# Patient Record
Sex: MALE | Race: BLACK OR AFRICAN AMERICAN | NOT HISPANIC OR LATINO | URBAN - METROPOLITAN AREA
[De-identification: names, ages, dates, MRNs, and addresses within clinical notes are randomized per-mention and may not be internally consistent; named-entity substitution may affect disease eponyms.]

---

## 2019-03-19 ENCOUNTER — INPATIENT (INPATIENT)
Facility: HOSPITAL | Age: 59
LOS: 9 days | Discharge: HOME | End: 2019-03-29
Attending: INTERNAL MEDICINE | Admitting: INTERNAL MEDICINE
Payer: MEDICAID

## 2019-03-19 VITALS
SYSTOLIC BLOOD PRESSURE: 119 MMHG | WEIGHT: 134.92 LBS | TEMPERATURE: 98 F | OXYGEN SATURATION: 96 % | DIASTOLIC BLOOD PRESSURE: 68 MMHG | RESPIRATION RATE: 16 BRPM | HEART RATE: 78 BPM | HEIGHT: 73 IN

## 2019-03-19 LAB
ALBUMIN SERPL ELPH-MCNC: 3.1 G/DL — LOW (ref 3.3–5)
ALP SERPL-CCNC: 107 U/L — SIGNIFICANT CHANGE UP (ref 40–120)
ALT FLD-CCNC: 16 U/L — SIGNIFICANT CHANGE UP (ref 12–78)
ANION GAP SERPL CALC-SCNC: 27 MMOL/L — HIGH (ref 5–17)
ANISOCYTOSIS BLD QL: SLIGHT — SIGNIFICANT CHANGE UP
APPEARANCE UR: CLEAR — SIGNIFICANT CHANGE UP
APTT BLD: 22.2 SEC — LOW (ref 27.5–36.3)
AST SERPL-CCNC: 34 U/L — SIGNIFICANT CHANGE UP (ref 15–37)
BACTERIA # UR AUTO: ABNORMAL
BASOPHILS # BLD AUTO: 0 K/UL — SIGNIFICANT CHANGE UP (ref 0–0.2)
BASOPHILS NFR BLD AUTO: 0 % — SIGNIFICANT CHANGE UP (ref 0–2)
BILIRUB SERPL-MCNC: 0.5 MG/DL — SIGNIFICANT CHANGE UP (ref 0.2–1.2)
BILIRUB UR-MCNC: NEGATIVE — SIGNIFICANT CHANGE UP
BLD GP AB SCN SERPL QL: SIGNIFICANT CHANGE UP
BUN SERPL-MCNC: 92 MG/DL — HIGH (ref 7–23)
CALCIUM SERPL-MCNC: 7.1 MG/DL — LOW (ref 8.5–10.1)
CHLORIDE SERPL-SCNC: 88 MMOL/L — LOW (ref 96–108)
CK MB CFR SERPL CALC: 6.5 NG/ML — HIGH (ref 0.5–3.6)
CO2 SERPL-SCNC: 17 MMOL/L — LOW (ref 22–31)
COLOR SPEC: YELLOW — SIGNIFICANT CHANGE UP
CREAT SERPL-MCNC: 4.48 MG/DL — HIGH (ref 0.5–1.3)
DACRYOCYTES BLD QL SMEAR: SLIGHT — SIGNIFICANT CHANGE UP
DIFF PNL FLD: ABNORMAL
ELLIPTOCYTES BLD QL SMEAR: SLIGHT — SIGNIFICANT CHANGE UP
EOSINOPHIL # BLD AUTO: 0 K/UL — SIGNIFICANT CHANGE UP (ref 0–0.5)
EOSINOPHIL NFR BLD AUTO: 0 % — SIGNIFICANT CHANGE UP (ref 0–6)
EPI CELLS # UR: SIGNIFICANT CHANGE UP
ETHANOL SERPL-MCNC: <10 MG/DL — SIGNIFICANT CHANGE UP (ref 0–10)
FLU A RESULT: SIGNIFICANT CHANGE UP
FLU A RESULT: SIGNIFICANT CHANGE UP
FLUAV AG NPH QL: SIGNIFICANT CHANGE UP
FLUBV AG NPH QL: SIGNIFICANT CHANGE UP
GLUCOSE SERPL-MCNC: 102 MG/DL — HIGH (ref 70–99)
GLUCOSE UR QL: NEGATIVE MG/DL — SIGNIFICANT CHANGE UP
HCT VFR BLD CALC: 21.7 % — LOW (ref 39–50)
HGB BLD-MCNC: 6 G/DL — CRITICAL LOW (ref 13–17)
HYPOCHROMIA BLD QL: SIGNIFICANT CHANGE UP
INR BLD: 0.93 RATIO — SIGNIFICANT CHANGE UP (ref 0.88–1.16)
KETONES UR-MCNC: ABNORMAL
LACTATE SERPL-SCNC: 1.6 MMOL/L — SIGNIFICANT CHANGE UP (ref 0.7–2)
LEUKOCYTE ESTERASE UR-ACNC: NEGATIVE — SIGNIFICANT CHANGE UP
LIDOCAIN IGE QN: 1404 U/L — HIGH (ref 73–393)
LYMPHOCYTES # BLD AUTO: 0.57 K/UL — LOW (ref 1–3.3)
LYMPHOCYTES # BLD AUTO: 4 % — LOW (ref 13–44)
MACROCYTES BLD QL: SLIGHT — SIGNIFICANT CHANGE UP
MANUAL SMEAR VERIFICATION: SIGNIFICANT CHANGE UP
MCHC RBC-ENTMCNC: 21.2 PG — LOW (ref 27–34)
MCHC RBC-ENTMCNC: 27.6 GM/DL — LOW (ref 32–36)
MCV RBC AUTO: 76.7 FL — LOW (ref 80–100)
MICROCYTES BLD QL: SIGNIFICANT CHANGE UP
MONOCYTES # BLD AUTO: 0.28 K/UL — SIGNIFICANT CHANGE UP (ref 0–0.9)
MONOCYTES NFR BLD AUTO: 2 % — SIGNIFICANT CHANGE UP (ref 2–14)
NEUTROPHILS # BLD AUTO: 13.38 K/UL — HIGH (ref 1.8–7.4)
NEUTROPHILS NFR BLD AUTO: 94 % — HIGH (ref 43–77)
NITRITE UR-MCNC: NEGATIVE — SIGNIFICANT CHANGE UP
NRBC # BLD: 0 /100 — SIGNIFICANT CHANGE UP (ref 0–0)
NRBC # BLD: SIGNIFICANT CHANGE UP /100 WBCS (ref 0–0)
NT-PROBNP SERPL-SCNC: 3818 PG/ML — HIGH (ref 0–125)
OVALOCYTES BLD QL SMEAR: SLIGHT — SIGNIFICANT CHANGE UP
PH UR: 6 — SIGNIFICANT CHANGE UP (ref 5–8)
PLAT MORPH BLD: NORMAL — SIGNIFICANT CHANGE UP
PLATELET # BLD AUTO: 312 K/UL — SIGNIFICANT CHANGE UP (ref 150–400)
POIKILOCYTOSIS BLD QL AUTO: SLIGHT — SIGNIFICANT CHANGE UP
POLYCHROMASIA BLD QL SMEAR: SLIGHT — SIGNIFICANT CHANGE UP
POTASSIUM SERPL-MCNC: 2.4 MMOL/L — CRITICAL LOW (ref 3.5–5.3)
POTASSIUM SERPL-SCNC: 2.4 MMOL/L — CRITICAL LOW (ref 3.5–5.3)
PROT SERPL-MCNC: 6.4 GM/DL — SIGNIFICANT CHANGE UP (ref 6–8.3)
PROT UR-MCNC: 100 MG/DL
PROTHROM AB SERPL-ACNC: 10.4 SEC — SIGNIFICANT CHANGE UP (ref 10–12.9)
RBC # BLD: 2.69 M/UL — LOW (ref 4.2–5.8)
RBC # BLD: 2.83 M/UL — LOW (ref 4.2–5.8)
RBC # FLD: 19.5 % — HIGH (ref 10.3–14.5)
RBC BLD AUTO: ABNORMAL
RBC CASTS # UR COMP ASSIST: SIGNIFICANT CHANGE UP /HPF (ref 0–4)
RETICS #: 60.5 K/UL — SIGNIFICANT CHANGE UP (ref 25–125)
RETICS/RBC NFR: 2.3 % — SIGNIFICANT CHANGE UP (ref 0.5–2.5)
RSV RESULT: SIGNIFICANT CHANGE UP
RSV RNA RESP QL NAA+PROBE: SIGNIFICANT CHANGE UP
SODIUM SERPL-SCNC: 132 MMOL/L — LOW (ref 135–145)
SP GR SPEC: 1.01 — SIGNIFICANT CHANGE UP (ref 1.01–1.02)
TARGETS BLD QL SMEAR: SLIGHT — SIGNIFICANT CHANGE UP
TROPONIN I SERPL-MCNC: <.015 NG/ML — SIGNIFICANT CHANGE UP (ref 0.01–0.04)
UROBILINOGEN FLD QL: NEGATIVE MG/DL — SIGNIFICANT CHANGE UP
WBC # BLD: 14.23 K/UL — HIGH (ref 3.8–10.5)
WBC # FLD AUTO: 14.23 K/UL — HIGH (ref 3.8–10.5)

## 2019-03-19 PROCEDURE — 99285 EMERGENCY DEPT VISIT HI MDM: CPT

## 2019-03-19 PROCEDURE — 93010 ELECTROCARDIOGRAM REPORT: CPT

## 2019-03-19 PROCEDURE — 83020 HEMOGLOBIN ELECTROPHORESIS: CPT | Mod: 26

## 2019-03-19 PROCEDURE — 71045 X-RAY EXAM CHEST 1 VIEW: CPT | Mod: 26

## 2019-03-19 PROCEDURE — 99223 1ST HOSP IP/OBS HIGH 75: CPT

## 2019-03-19 PROCEDURE — 74176 CT ABD & PELVIS W/O CONTRAST: CPT | Mod: 26

## 2019-03-19 PROCEDURE — 72100 X-RAY EXAM L-S SPINE 2/3 VWS: CPT | Mod: 26

## 2019-03-19 RX ORDER — OXYCODONE AND ACETAMINOPHEN 5; 325 MG/1; MG/1
2 TABLET ORAL ONCE
Qty: 0 | Refills: 0 | Status: DISCONTINUED | OUTPATIENT
Start: 2019-03-19 | End: 2019-03-19

## 2019-03-19 RX ORDER — POTASSIUM CHLORIDE 20 MEQ
40 PACKET (EA) ORAL ONCE
Qty: 0 | Refills: 0 | Status: COMPLETED | OUTPATIENT
Start: 2019-03-19 | End: 2019-03-19

## 2019-03-19 RX ORDER — MORPHINE SULFATE 50 MG/1
4 CAPSULE, EXTENDED RELEASE ORAL ONCE
Qty: 0 | Refills: 0 | Status: DISCONTINUED | OUTPATIENT
Start: 2019-03-19 | End: 2019-03-19

## 2019-03-19 RX ORDER — IOHEXOL 300 MG/ML
30 INJECTION, SOLUTION INTRAVENOUS ONCE
Qty: 0 | Refills: 0 | Status: COMPLETED | OUTPATIENT
Start: 2019-03-19 | End: 2019-03-19

## 2019-03-19 RX ORDER — POTASSIUM CHLORIDE 20 MEQ
20 PACKET (EA) ORAL ONCE
Qty: 0 | Refills: 0 | Status: COMPLETED | OUTPATIENT
Start: 2019-03-19 | End: 2019-03-19

## 2019-03-19 RX ADMIN — OXYCODONE AND ACETAMINOPHEN 2 TABLET(S): 5; 325 TABLET ORAL at 21:56

## 2019-03-19 RX ADMIN — Medication 20 MILLIEQUIVALENT(S): at 19:00

## 2019-03-19 RX ADMIN — MORPHINE SULFATE 4 MILLIGRAM(S): 50 CAPSULE, EXTENDED RELEASE ORAL at 18:34

## 2019-03-19 RX ADMIN — OXYCODONE AND ACETAMINOPHEN 2 TABLET(S): 5; 325 TABLET ORAL at 21:30

## 2019-03-19 RX ADMIN — MORPHINE SULFATE 4 MILLIGRAM(S): 50 CAPSULE, EXTENDED RELEASE ORAL at 18:04

## 2019-03-19 RX ADMIN — Medication 50 MILLIEQUIVALENT(S): at 17:49

## 2019-03-19 RX ADMIN — IOHEXOL 30 MILLILITER(S): 300 INJECTION, SOLUTION INTRAVENOUS at 17:49

## 2019-03-19 RX ADMIN — Medication 40 MILLIEQUIVALENT(S): at 17:49

## 2019-03-19 NOTE — H&P ADULT - HISTORY OF PRESENT ILLNESS
Pt to be admitted for pancreatitis, in progress. 58 year old man with no PMH (per him), former ETOH abuser presents to ED with complaint of abdominal pain with Back pain.  He is AAO x 3 but does not seem to be a reliable historian regarding PMH.  He does not know any medications (says "they all don't work, whatever they are, I don't take them").  In addition to his abdominal pain and Back pain he complains of nausea and vomiting.  He offers no other complaints.    In the ED, pt's h/h 6/21.7, BMP consistent with renal failure without baseline, K 2.4, supplemented IV in ED.  EKG NSR.  Lipase 1404, BNP 3818.  CT imaging consistent with chronic pancreatitis.

## 2019-03-19 NOTE — H&P ADULT - NSICDXPROBLEM_GEN_ALL_CORE_FT
PROBLEM DIAGNOSES  Problem: Acute on chronic pancreatitis  Assessment and Plan: IVF hydration  Clear liquid diet  Analgesia PRN  GI consult    Problem: Hypokalemia  Assessment and Plan: Supplemented in ED  Follow repeat labs, supplement PRN    Problem: Other anemia  Assessment and Plan: Unclear etiology, possibly renal  Pt denied BRBPR, dark stools  Follow up Retic Count, Fe, TIBC, Ferritin, B12, Folate, haptoglobin, LDH    Problem: Abnormal renal function  Assessment and Plan: No baseline for comparison  Will hydrate, follow repeat  Nephrology consult    Problem: Preventive measure  Assessment and Plan: SCD for DVT prophylaxis  General precautions

## 2019-03-19 NOTE — ED ADULT NURSE NOTE - OBJECTIVE STATEMENT
back injury since childhood snapped back diving c/o lower back pain, sent back from fast track area, c/o 6/10 pain in lower back

## 2019-03-19 NOTE — H&P ADULT - NSHPPHYSICALEXAM_GEN_ALL_CORE
GENERAL: NAD, disheveled, well-developed  HEAD:  Atraumatic, Normocephalic  EYES: EOMI, PERRLA, conjunctiva and sclera clear  ENMT: No tonsillar erythema, exudates, or enlargement; Moist mucous membranes, No lesions, poor dentition  NECK: Supple, No JVD, Normal thyroid  NERVOUS SYSTEM:  Alert & Oriented X3, Good concentration  CHEST/LUNG: Clear to ascultation bilaterally; No rales, rhonchi, wheezing, or rubs  HEART: Regular rate and rhythm; No murmurs, rubs, or gallops  ABDOMEN: Soft, Nontender, Nondistended; Bowel sounds present  EXTREMITIES: no clubbing, cyanosis or edema  SKIN: no rashes or lesions  PSYCH: normal affect and behavior

## 2019-03-19 NOTE — ED PROVIDER NOTE - CARE PLAN
Principal Discharge DX:	Alcohol-induced acute pancreatitis, unspecified complication status  Secondary Diagnosis:	Acute renal failure, unspecified acute renal failure type  Secondary Diagnosis:	Anemia, unspecified type  Secondary Diagnosis:	Hypokalemia

## 2019-03-19 NOTE — H&P ADULT - ASSESSMENT
58 year old man with no PMH (per him), former ETOH abuser presents to ED with complaint of abdominal pain with Back pain.  Patient will require admission for at least 2 midnights as detailed below:    IMPROVE VTE Individual Risk Assessment          RISK                                                          Points    [  ] Previous VTE                                                3    [  ] Thrombophilia                                             2    [  ] Lower limb paralysis                                    2        (unable to hold up >15 seconds)      [  ] Current Cancer                                             2         (within 6 months)    [ x ] Immobilization > 24 hrs                              1    [  ] ICU/CCU stay > 24 hours                            1    [  ] Age > 60                                                    1    IMPROVE VTE Score _____1____

## 2019-03-19 NOTE — ED PROVIDER NOTE - PROGRESS NOTE DETAILS
Cesar: patient signed out by Dr. Perez pending CT. CT reviewed. patient now admitted to medicine for further management.

## 2019-03-19 NOTE — ED PROVIDER NOTE - OBJECTIVE STATEMENT
Pt is a 59 yo gentleman with a pmhx of HTN, etoh abuse who presents to the ED with back pain. Says it has been going on for years. Worse today. Also says he has some abdominal pain, epigastric. No chest pain, no sob, no n/v/d. Has had a cough for past several days. No body aches, no fevers. cough productive of brown, green sputum. No sob. No dark stool, no lightheadedness, no brbpr.

## 2019-03-19 NOTE — ED ADULT NURSE NOTE - NSIMPLEMENTINTERV_GEN_ALL_ED
Implemented All Universal Safety Interventions:  [place] to call system. Call bell, personal items and telephone within reach. Instruct patient to call for assistance. Room bathroom lighting operational. Non-slip footwear when patient is off stretcher. Physically safe environment: no spills, clutter or unnecessary equipment. Stretcher in lowest position, wheels locked, appropriate side rails in place.

## 2019-03-19 NOTE — H&P ADULT - NSHPLABSRESULTS_GEN_ALL_CORE
Vital Signs Last 24 Hrs  T(C): 36.2 (19 Mar 2019 23:04), Max: 37.2 (19 Mar 2019 22:26)  T(F): 97.1 (19 Mar 2019 23:04), Max: 98.9 (19 Mar 2019 22:26)  HR: 82 (19 Mar 2019 23:04) (78 - 84)  BP: 162/78 (19 Mar 2019 23:04) (119/68 - 174/85)  BP(mean): --  RR: 18 (19 Mar 2019 23:04) (16 - 19)  SpO2: 99% (19 Mar 2019 23:04) (96% - 100%)          LABS:                        6.0    14.23 )-----------( 312      ( 19 Mar 2019 17:03 )             21.7     -    132<L>  |  88<L>  |  92<H>  ----------------------------<  102<H>  2.4<LL>   |  17<L>  |  4.48<H>    Ca    7.1<L>      19 Mar 2019 17:03    TPro  6.4  /  Alb  3.1<L>  /  TBili  0.5  /  DBili  x   /  AST  34  /  ALT  16  /  AlkPhos  107  -19    PT/INR - ( 19 Mar 2019 18:14 )   PT: 10.4 sec;   INR: 0.93 ratio         PTT - ( 19 Mar 2019 18:14 )  PTT:22.2 sec  Urinalysis Basic - ( 19 Mar 2019 22:18 )    Color: Yellow / Appearance: Clear / S.010 / pH: x  Gluc: x / Ketone: Small  / Bili: Negative / Urobili: Negative mg/dL   Blood: x / Protein: 100 mg/dL / Nitrite: Negative   Leuk Esterase: Negative / RBC: 0-2 /HPF / WBC x   Sq Epi: x / Non Sq Epi: Occasional / Bacteria: Moderate        RADIOLOGY & ADDITIONAL STUDIES:    CT ab/plv:  IMPRESSION:   Numerous calcifications in the pancreatic head and pancreatic body.   Dilatation of the pancreatic duct within the pancreatic body and tail.   Findings likely representing chronic pancreatitis. Limited evaluation for   focal mass secondary to lack of IV contrast. If concern for focal mass,   repeat CT or MRI with and without contrast can be obtained.  No definite   CT findings of acute pancreatitis.

## 2019-03-19 NOTE — ED PROVIDER NOTE - MUSCULOSKELETAL, MLM
Spine appears normal, range of motion is not limited, no muscle or joint tenderness. No midline back pain

## 2019-03-20 DIAGNOSIS — E87.6 HYPOKALEMIA: ICD-10-CM

## 2019-03-20 DIAGNOSIS — D50.9 IRON DEFICIENCY ANEMIA, UNSPECIFIED: ICD-10-CM

## 2019-03-20 DIAGNOSIS — K85.90 ACUTE PANCREATITIS WITHOUT NECROSIS OR INFECTION, UNSPECIFIED: ICD-10-CM

## 2019-03-20 DIAGNOSIS — D64.9 ANEMIA, UNSPECIFIED: ICD-10-CM

## 2019-03-20 DIAGNOSIS — Z29.9 ENCOUNTER FOR PROPHYLACTIC MEASURES, UNSPECIFIED: ICD-10-CM

## 2019-03-20 DIAGNOSIS — N28.9 DISORDER OF KIDNEY AND URETER, UNSPECIFIED: ICD-10-CM

## 2019-03-20 DIAGNOSIS — F10.10 ALCOHOL ABUSE, UNCOMPLICATED: ICD-10-CM

## 2019-03-20 LAB
ALBUMIN SERPL ELPH-MCNC: 3 G/DL — LOW (ref 3.3–5)
ALP SERPL-CCNC: 102 U/L — SIGNIFICANT CHANGE UP (ref 40–120)
ALT FLD-CCNC: 15 U/L — SIGNIFICANT CHANGE UP (ref 12–78)
ANION GAP SERPL CALC-SCNC: 19 MMOL/L — HIGH (ref 5–17)
AST SERPL-CCNC: 27 U/L — SIGNIFICANT CHANGE UP (ref 15–37)
BASOPHILS # BLD AUTO: 0.03 K/UL — SIGNIFICANT CHANGE UP (ref 0–0.2)
BASOPHILS NFR BLD AUTO: 0.2 % — SIGNIFICANT CHANGE UP (ref 0–2)
BILIRUB SERPL-MCNC: 0.4 MG/DL — SIGNIFICANT CHANGE UP (ref 0.2–1.2)
BUN SERPL-MCNC: 90 MG/DL — HIGH (ref 7–23)
CALCIUM SERPL-MCNC: 6.7 MG/DL — LOW (ref 8.5–10.1)
CHLORIDE SERPL-SCNC: 95 MMOL/L — LOW (ref 96–108)
CHOLEST SERPL-MCNC: 119 MG/DL — SIGNIFICANT CHANGE UP (ref 10–199)
CHOLEST SERPL-MCNC: 123 MG/DL — SIGNIFICANT CHANGE UP (ref 10–199)
CO2 SERPL-SCNC: 19 MMOL/L — LOW (ref 22–31)
CREAT SERPL-MCNC: 4.08 MG/DL — HIGH (ref 0.5–1.3)
EOSINOPHIL # BLD AUTO: 0 K/UL — SIGNIFICANT CHANGE UP (ref 0–0.5)
EOSINOPHIL NFR BLD AUTO: 0 % — SIGNIFICANT CHANGE UP (ref 0–6)
FERRITIN SERPL-MCNC: 82 NG/ML — SIGNIFICANT CHANGE UP (ref 30–400)
GLUCOSE SERPL-MCNC: 101 MG/DL — HIGH (ref 70–99)
HAPTOGLOB SERPL-MCNC: 98 MG/DL — SIGNIFICANT CHANGE UP (ref 34–200)
HBA1C BLD-MCNC: 5 % — SIGNIFICANT CHANGE UP (ref 4–5.6)
HCT VFR BLD CALC: 28.3 % — LOW (ref 39–50)
HCV AB S/CO SERPL IA: 9.33 S/CO — HIGH (ref 0–0.79)
HCV AB SERPL-IMP: REACTIVE
HDLC SERPL-MCNC: 32 MG/DL — LOW
HDLC SERPL-MCNC: 32 MG/DL — LOW
HGB BLD-MCNC: 8.7 G/DL — LOW (ref 13–17)
IMM GRANULOCYTES NFR BLD AUTO: 0.6 % — SIGNIFICANT CHANGE UP (ref 0–1.5)
IRON SATN MFR SERPL: 252 UG/DL — HIGH (ref 45–165)
IRON SATN MFR SERPL: 91 % — HIGH (ref 16–55)
LDH SERPL L TO P-CCNC: 205 U/L — SIGNIFICANT CHANGE UP (ref 50–242)
LIDOCAIN IGE QN: 2260 U/L — HIGH (ref 73–393)
LIPID PNL WITH DIRECT LDL SERPL: 57 MG/DL — SIGNIFICANT CHANGE UP
LIPID PNL WITH DIRECT LDL SERPL: 59 MG/DL — SIGNIFICANT CHANGE UP
LYMPHOCYTES # BLD AUTO: 0.58 K/UL — LOW (ref 1–3.3)
LYMPHOCYTES # BLD AUTO: 4.6 % — LOW (ref 13–44)
MAGNESIUM SERPL-MCNC: 3.2 MG/DL — HIGH (ref 1.6–2.6)
MCHC RBC-ENTMCNC: 24.1 PG — LOW (ref 27–34)
MCHC RBC-ENTMCNC: 30.7 GM/DL — LOW (ref 32–36)
MCV RBC AUTO: 78.4 FL — LOW (ref 80–100)
MONOCYTES # BLD AUTO: 1.1 K/UL — HIGH (ref 0–0.9)
MONOCYTES NFR BLD AUTO: 8.7 % — SIGNIFICANT CHANGE UP (ref 2–14)
NEUTROPHILS # BLD AUTO: 10.9 K/UL — HIGH (ref 1.8–7.4)
NEUTROPHILS NFR BLD AUTO: 85.9 % — HIGH (ref 43–77)
NRBC # BLD: 0 /100 WBCS — SIGNIFICANT CHANGE UP (ref 0–0)
PHOSPHATE SERPL-MCNC: 2.3 MG/DL — LOW (ref 2.5–4.5)
PLATELET # BLD AUTO: 243 K/UL — SIGNIFICANT CHANGE UP (ref 150–400)
POTASSIUM SERPL-MCNC: 2.3 MMOL/L — CRITICAL LOW (ref 3.5–5.3)
POTASSIUM SERPL-SCNC: 2.3 MMOL/L — CRITICAL LOW (ref 3.5–5.3)
PROT SERPL-MCNC: 6.4 GM/DL — SIGNIFICANT CHANGE UP (ref 6–8.3)
RBC # BLD: 3.61 M/UL — LOW (ref 4.2–5.8)
RBC # BLD: 3.61 M/UL — LOW (ref 4.2–5.8)
RBC # FLD: 18.6 % — HIGH (ref 10.3–14.5)
RETICS #: 50.5 K/UL — SIGNIFICANT CHANGE UP (ref 25–125)
RETICS/RBC NFR: 1.4 % — SIGNIFICANT CHANGE UP (ref 0.5–2.5)
SODIUM SERPL-SCNC: 133 MMOL/L — LOW (ref 135–145)
TIBC SERPL-MCNC: 277 UG/DL — SIGNIFICANT CHANGE UP (ref 220–430)
TOTAL CHOLESTEROL/HDL RATIO MEASUREMENT: 3.7 RATIO — SIGNIFICANT CHANGE UP (ref 3.4–9.6)
TOTAL CHOLESTEROL/HDL RATIO MEASUREMENT: 3.8 RATIO — SIGNIFICANT CHANGE UP (ref 3.4–9.6)
TRIGL SERPL-MCNC: 148 MG/DL — SIGNIFICANT CHANGE UP (ref 10–149)
TRIGL SERPL-MCNC: 158 MG/DL — HIGH (ref 10–149)
UIBC SERPL-MCNC: 25 UG/DL — LOW (ref 110–370)
VIT B12 SERPL-MCNC: 301 PG/ML — SIGNIFICANT CHANGE UP (ref 232–1245)
WBC # BLD: 12.69 K/UL — HIGH (ref 3.8–10.5)
WBC # FLD AUTO: 12.69 K/UL — HIGH (ref 3.8–10.5)

## 2019-03-20 PROCEDURE — 99233 SBSQ HOSP IP/OBS HIGH 50: CPT

## 2019-03-20 RX ORDER — MORPHINE SULFATE 50 MG/1
2 CAPSULE, EXTENDED RELEASE ORAL EVERY 6 HOURS
Qty: 0 | Refills: 0 | Status: DISCONTINUED | OUTPATIENT
Start: 2019-03-20 | End: 2019-03-27

## 2019-03-20 RX ORDER — ACETAMINOPHEN 500 MG
650 TABLET ORAL EVERY 6 HOURS
Qty: 0 | Refills: 0 | Status: DISCONTINUED | OUTPATIENT
Start: 2019-03-20 | End: 2019-03-20

## 2019-03-20 RX ORDER — SODIUM CHLORIDE 9 MG/ML
1000 INJECTION INTRAMUSCULAR; INTRAVENOUS; SUBCUTANEOUS
Qty: 0 | Refills: 0 | Status: DISCONTINUED | OUTPATIENT
Start: 2019-03-20 | End: 2019-03-21

## 2019-03-20 RX ORDER — THIAMINE MONONITRATE (VIT B1) 100 MG
100 TABLET ORAL DAILY
Qty: 0 | Refills: 0 | Status: DISCONTINUED | OUTPATIENT
Start: 2019-03-20 | End: 2019-03-29

## 2019-03-20 RX ORDER — MORPHINE SULFATE 50 MG/1
15 CAPSULE, EXTENDED RELEASE ORAL EVERY 12 HOURS
Qty: 0 | Refills: 0 | Status: DISCONTINUED | OUTPATIENT
Start: 2019-03-20 | End: 2019-03-27

## 2019-03-20 RX ORDER — POTASSIUM CHLORIDE 20 MEQ
40 PACKET (EA) ORAL EVERY 4 HOURS
Qty: 0 | Refills: 0 | Status: COMPLETED | OUTPATIENT
Start: 2019-03-20 | End: 2019-03-20

## 2019-03-20 RX ORDER — ONDANSETRON 8 MG/1
8 TABLET, FILM COATED ORAL EVERY 8 HOURS
Qty: 0 | Refills: 0 | Status: DISCONTINUED | OUTPATIENT
Start: 2019-03-20 | End: 2019-03-29

## 2019-03-20 RX ORDER — SODIUM,POTASSIUM PHOSPHATES 278-250MG
1 POWDER IN PACKET (EA) ORAL
Qty: 0 | Refills: 0 | Status: COMPLETED | OUTPATIENT
Start: 2019-03-20 | End: 2019-03-20

## 2019-03-20 RX ORDER — OXYCODONE AND ACETAMINOPHEN 5; 325 MG/1; MG/1
1 TABLET ORAL EVERY 6 HOURS
Qty: 0 | Refills: 0 | Status: DISCONTINUED | OUTPATIENT
Start: 2019-03-20 | End: 2019-03-27

## 2019-03-20 RX ORDER — POTASSIUM CHLORIDE 20 MEQ
10 PACKET (EA) ORAL
Qty: 0 | Refills: 0 | Status: COMPLETED | OUTPATIENT
Start: 2019-03-20 | End: 2019-03-20

## 2019-03-20 RX ORDER — SODIUM CHLORIDE 9 MG/ML
1000 INJECTION INTRAMUSCULAR; INTRAVENOUS; SUBCUTANEOUS ONCE
Qty: 0 | Refills: 0 | Status: COMPLETED | OUTPATIENT
Start: 2019-03-20 | End: 2019-03-20

## 2019-03-20 RX ORDER — MULTIVIT-MIN/FERROUS GLUCONATE 9 MG/15 ML
1 LIQUID (ML) ORAL DAILY
Qty: 0 | Refills: 0 | Status: DISCONTINUED | OUTPATIENT
Start: 2019-03-20 | End: 2019-03-29

## 2019-03-20 RX ORDER — FOLIC ACID 0.8 MG
1 TABLET ORAL DAILY
Qty: 0 | Refills: 0 | Status: DISCONTINUED | OUTPATIENT
Start: 2019-03-20 | End: 2019-03-29

## 2019-03-20 RX ORDER — AMLODIPINE BESYLATE 2.5 MG/1
5 TABLET ORAL DAILY
Qty: 0 | Refills: 0 | Status: DISCONTINUED | OUTPATIENT
Start: 2019-03-20 | End: 2019-03-21

## 2019-03-20 RX ADMIN — OXYCODONE AND ACETAMINOPHEN 1 TABLET(S): 5; 325 TABLET ORAL at 12:17

## 2019-03-20 RX ADMIN — OXYCODONE AND ACETAMINOPHEN 1 TABLET(S): 5; 325 TABLET ORAL at 05:19

## 2019-03-20 RX ADMIN — MORPHINE SULFATE 2 MILLIGRAM(S): 50 CAPSULE, EXTENDED RELEASE ORAL at 21:29

## 2019-03-20 RX ADMIN — OXYCODONE AND ACETAMINOPHEN 1 TABLET(S): 5; 325 TABLET ORAL at 18:00

## 2019-03-20 RX ADMIN — Medication 1 TABLET(S): at 21:29

## 2019-03-20 RX ADMIN — MORPHINE SULFATE 2 MILLIGRAM(S): 50 CAPSULE, EXTENDED RELEASE ORAL at 03:16

## 2019-03-20 RX ADMIN — SODIUM CHLORIDE 100 MILLILITER(S): 9 INJECTION INTRAMUSCULAR; INTRAVENOUS; SUBCUTANEOUS at 21:35

## 2019-03-20 RX ADMIN — Medication 1 TABLET(S): at 17:06

## 2019-03-20 RX ADMIN — MORPHINE SULFATE 2 MILLIGRAM(S): 50 CAPSULE, EXTENDED RELEASE ORAL at 15:09

## 2019-03-20 RX ADMIN — MORPHINE SULFATE 2 MILLIGRAM(S): 50 CAPSULE, EXTENDED RELEASE ORAL at 09:08

## 2019-03-20 RX ADMIN — MORPHINE SULFATE 2 MILLIGRAM(S): 50 CAPSULE, EXTENDED RELEASE ORAL at 17:05

## 2019-03-20 RX ADMIN — MORPHINE SULFATE 2 MILLIGRAM(S): 50 CAPSULE, EXTENDED RELEASE ORAL at 21:50

## 2019-03-20 RX ADMIN — Medication 1 TABLET(S): at 11:47

## 2019-03-20 RX ADMIN — AMLODIPINE BESYLATE 5 MILLIGRAM(S): 2.5 TABLET ORAL at 06:39

## 2019-03-20 RX ADMIN — Medication 100 MILLIEQUIVALENT(S): at 10:36

## 2019-03-20 RX ADMIN — SODIUM CHLORIDE 100 MILLILITER(S): 9 INJECTION INTRAMUSCULAR; INTRAVENOUS; SUBCUTANEOUS at 18:53

## 2019-03-20 RX ADMIN — Medication 100 MILLIEQUIVALENT(S): at 13:08

## 2019-03-20 RX ADMIN — OXYCODONE AND ACETAMINOPHEN 1 TABLET(S): 5; 325 TABLET ORAL at 19:15

## 2019-03-20 RX ADMIN — SODIUM CHLORIDE 500 MILLILITER(S): 9 INJECTION INTRAMUSCULAR; INTRAVENOUS; SUBCUTANEOUS at 02:38

## 2019-03-20 RX ADMIN — Medication 40 MILLIEQUIVALENT(S): at 10:34

## 2019-03-20 RX ADMIN — OXYCODONE AND ACETAMINOPHEN 1 TABLET(S): 5; 325 TABLET ORAL at 06:18

## 2019-03-20 RX ADMIN — Medication 100 MILLIEQUIVALENT(S): at 11:48

## 2019-03-20 RX ADMIN — SODIUM CHLORIDE 100 MILLILITER(S): 9 INJECTION INTRAMUSCULAR; INTRAVENOUS; SUBCUTANEOUS at 05:16

## 2019-03-20 RX ADMIN — Medication 40 MILLIEQUIVALENT(S): at 13:08

## 2019-03-20 RX ADMIN — MORPHINE SULFATE 2 MILLIGRAM(S): 50 CAPSULE, EXTENDED RELEASE ORAL at 10:06

## 2019-03-20 RX ADMIN — OXYCODONE AND ACETAMINOPHEN 1 TABLET(S): 5; 325 TABLET ORAL at 11:47

## 2019-03-20 RX ADMIN — MORPHINE SULFATE 2 MILLIGRAM(S): 50 CAPSULE, EXTENDED RELEASE ORAL at 02:37

## 2019-03-20 RX ADMIN — Medication 100 MILLIGRAM(S): at 18:00

## 2019-03-20 NOTE — DIETITIAN INITIAL EVALUATION ADULT. - NUTRITION INTERVENTION
Parenteral Nutrition/IV Fluids/Meals and Snack Meals and Snack/Medical Food Supplements/Parenteral Nutrition/IV Fluids

## 2019-03-20 NOTE — CONSULT NOTE ADULT - SUBJECTIVE AND OBJECTIVE BOX
Information from chart:  "Patient is a 58y old  Male who presents with a chief complaint of pancreatitis, anemia, abnormal renal fxn (19 Mar 2019 21:52)    HPI:  58 year old man with no PMH (per him), former ETOH abuser presents to ED with complaint of abdominal pain with Back pain.  He is AAO x 3 but does not seem to be a reliable historian regarding PMH.  He does not know any medications (says "they all don't work, whatever they are, I don't take them").  In addition to his abdominal pain and Back pain he complains of nausea and vomiting.  He offers no other complaints.    In the ED, pt's h/h /.7, BMP consistent with renal failure without baseline, K 2.4, supplemented IV in ED.  EKG NSR.  Lipase 1404, BNP 3818.  CT imaging consistent with chronic pancreatitis. (19 Mar 2019 21:52)   "      Patient has been taking 6 advil daily for back pain for the past month but didnt help his back pain  Has a drink of liquor or beer " every hour"  Poor po intake;       PAST MEDICAL & SURGICAL HISTORY:    FAMILY HISTORY:    Allergies    No Known Allergies    Intolerances      Home Medications:    MEDICATIONS  (STANDING):  amLODIPine   Tablet 5 milliGRAM(s) Oral daily  potassium acid phosphate/sodium acid phosphate tablet (K-PHOS No. 2) 1 Tablet(s) Oral four times a day with meals  sodium chloride 0.9%. 1000 milliLiter(s) (100 mL/Hr) IV Continuous <Continuous>    MEDICATIONS  (PRN):  acetaminophen   Tablet .. 650 milliGRAM(s) Oral every 6 hours PRN Temp greater or equal to 38C (100.4F), Mild Pain (1 - 3)  morphine  - Injectable 2 milliGRAM(s) IV Push every 6 hours PRN Severe Pain (7 - 10)  ondansetron Injectable 8 milliGRAM(s) IV Push every 8 hours PRN Nausea and/or Vomiting  oxyCODONE    5 mG/acetaminophen 325 mG 1 Tablet(s) Oral every 6 hours PRN Moderate Pain (4 - 6)    Vital Signs Last 24 Hrs  T(C): 36 (20 Mar 2019 11:40), Max: 37.2 (19 Mar 2019 22:26)  T(F): 96.8 (20 Mar 2019 11:40), Max: 98.9 (19 Mar 2019 22:26)  HR: 79 (20 Mar 2019 11:40) (75 - 84)  BP: 144/68 (20 Mar 2019 11:40) (119/68 - 182/87)  BP(mean): --  RR: 18 (20 Mar 2019 11:40) (16 - 19)  SpO2: 99% (20 Mar 2019 11:40) (96% - 100%)    Daily Height in cm: 185.42 (19 Mar 2019 23:04)    Daily Weight in k.4 (20 Mar 2019 10:41)    19 @ 07:01  -  19 @ 07:00  --------------------------------------------------------  IN: 0 mL / OUT: 450 mL / NET: -450 mL      CAPILLARY BLOOD GLUCOSE        PHYSICAL EXAM:      T(C): 36 (19 @ 11:40), Max: 37.2 (19 @ 22:26)  HR: 79 (19 @ 11:40) (75 - 84)  BP: 144/68 (19 @ 11:40) (119/68 - 182/87)  RR: 18 (19 @ 11:40) (16 - 19)  SpO2: 99% (19 @ 11:40) (96% - 100%)  Wt(kg): --  Respiratory: clear anteriorly, decreased BS at bases  Cardiovascular: S1 S2  Gastrointestinal: soft NT ND +BS  Extremities:   edema                  133<L>  |  95<L>  |  90<H>  ----------------------------<  101<H>  2.3<LL>   |  19<L>  |  4.08<H>    Ca    6.7<L>  Ca 7.5 corrected    20 Mar 2019 08:17  Phos  2.3       Mg     3.2         TPro  6.4  /  Alb  3.0<L>  /  TBili  0.4  /  DBili  x   /  AST  27  /  ALT  15  /  AlkPhos  102                            8.7    12.69 )-----------( 243      ( 20 Mar 2019 08:17 )             28.3     Urinalysis Basic - ( 19 Mar 2019 22:18 )    Color: Yellow / Appearance: Clear / S.010 / pH: x  Gluc: x / Ketone: Small  / Bili: Negative / Urobili: Negative mg/dL   Blood: x / Protein: 100 mg/dL / Nitrite: Negative   Leuk Esterase: Negative / RBC: 0-2 /HPF / WBC x   Sq Epi: x / Non Sq Epi: Occasional / Bacteria: Moderate        Assessment and Plan:     YUNIEL, hypokalemia; acute pancreatitis;  degree of CKD unclear; pre renal azotemia;  NSAIDs AIN; hemodynamic effect; r/o paraprotein disease;  Replete K, suspected poor intake and possible renal loss; continue IVF;  Urine eos; BEnce Oseguera; serum electrophoresis;   Will follow.

## 2019-03-20 NOTE — CONSULT NOTE ADULT - ASSESSMENT
59y/o male with Acute on Chronic Pancreatitis, Microcytic anemia, Etoh abuse - patient states he has been drinking for the last 35+ years and quit last weak because of abdominal pain

## 2019-03-20 NOTE — DIETITIAN INITIAL EVALUATION ADULT. - PERTINENT MEDS FT
MEDICATIONS  (STANDING):  amLODIPine   Tablet 5 milliGRAM(s) Oral daily  potassium acid phosphate/sodium acid phosphate tablet (K-PHOS No. 2) 1 Tablet(s) Oral four times a day with meals  potassium chloride    Tablet ER 40 milliEquivalent(s) Oral every 4 hours  potassium chloride  10 mEq/100 mL IVPB 10 milliEquivalent(s) IV Intermittent every 1 hour  sodium chloride 0.9%. 1000 milliLiter(s) (100 mL/Hr) IV Continuous <Continuous>    MEDICATIONS  (PRN):  acetaminophen   Tablet .. 650 milliGRAM(s) Oral every 6 hours PRN Temp greater or equal to 38C (100.4F), Mild Pain (1 - 3)  morphine  - Injectable 2 milliGRAM(s) IV Push every 6 hours PRN Severe Pain (7 - 10)  ondansetron Injectable 8 milliGRAM(s) IV Push every 8 hours PRN Nausea and/or Vomiting  oxyCODONE    5 mG/acetaminophen 325 mG 1 Tablet(s) Oral every 6 hours PRN Moderate Pain (4 - 6)

## 2019-03-20 NOTE — CHART NOTE - NSCHARTNOTEFT_GEN_A_CORE
Upon Nutritional Assessment by the Registered Dietitian your patient was determined to meet criteria / has evidence of the following diagnosis/diagnoses:          [ ]  Mild Protein Calorie Malnutrition        [ ]  Moderate Protein Calorie Malnutrition        [ X] Severe Protein Calorie Malnutrition        [ ] Unspecified Protein Calorie Malnutrition        [ ] Underweight / BMI <19        [ ] Morbid Obesity / BMI > 40      Findings as based on:  •  Comprehensive nutrition assessment and consultation  •  Calorie counts (nutrient intake analysis)  •  Food acceptance and intake status from observations by staff  •  Follow up  •  Patient education  •  Intervention secondary to interdisciplinary rounds  •   concerns      Treatment:    The following diet has been recommended:    Recommend when appropriate a low sodium, low fat diet with Ensure Clear x 3 (520 elizabeth, 24 g protein).  If pt unable to consume diet, recommend initiation of TPN/PPN.       PROVIDER Section:     By signing this assessment you are acknowledging and agree with the diagnosis/diagnoses assigned by the Registered Dietitian    Comments: Upon Nutritional Assessment by the Registered Dietitian your patient was determined to meet criteria / has evidence of the following diagnosis/diagnoses:          [ ]  Mild Protein Calorie Malnutrition        [ ]  Moderate Protein Calorie Malnutrition        [ X] Severe Protein Calorie Malnutrition        [ ] Unspecified Protein Calorie Malnutrition        [x ] Underweight / BMI <19        [ ] Morbid Obesity / BMI > 40      Findings as based on:  •  Comprehensive nutrition assessment and consultation  •  Calorie counts (nutrient intake analysis)  •  Food acceptance and intake status from observations by staff  •  Follow up  •  Patient education  •  Intervention secondary to interdisciplinary rounds  •   concerns      Treatment:    The following diet has been recommended:    Recommend when appropriate a low sodium, low fat diet with Ensure Clear x 3 (520 elizabeth, 24 g protein).  If pt unable to consume diet, recommend initiation of TPN/PPN.       PROVIDER Section:     By signing this assessment you are acknowledging and agree with the diagnosis/diagnoses assigned by the Registered Dietitian    Comments:

## 2019-03-20 NOTE — DIETITIAN INITIAL EVALUATION ADULT. - ENERGY NEEDS
Height (cm): 185.42 (03-19)  Weight (kg): 57.5 (03-19)  BMI (kg/m2): 16.7 (03-19)  IBW:  83.4 kg        % IBW:    69%         UBW:              %UBW

## 2019-03-20 NOTE — PROGRESS NOTE ADULT - SUBJECTIVE AND OBJECTIVE BOX
Patient is a 58y old  Male who presents with a chief complaint of pancreatitis, anemia, abnormal renal fxn (19 Mar 2019 21:52)      INTERVAL HPI/OVERNIGHT EVENTS:    MEDICATIONS  (STANDING):  amLODIPine   Tablet 5 milliGRAM(s) Oral daily  potassium acid phosphate/sodium acid phosphate tablet (K-PHOS No. 2) 1 Tablet(s) Oral four times a day with meals  sodium chloride 0.9%. 1000 milliLiter(s) (100 mL/Hr) IV Continuous <Continuous>    MEDICATIONS  (PRN):  acetaminophen   Tablet .. 650 milliGRAM(s) Oral every 6 hours PRN Temp greater or equal to 38C (100.4F), Mild Pain (1 - 3)  morphine  - Injectable 2 milliGRAM(s) IV Push every 6 hours PRN Severe Pain (7 - 10)  ondansetron Injectable 8 milliGRAM(s) IV Push every 8 hours PRN Nausea and/or Vomiting  oxyCODONE    5 mG/acetaminophen 325 mG 1 Tablet(s) Oral every 6 hours PRN Moderate Pain (4 - 6)      Allergies    No Known Allergies    Intolerances        Vital Signs Last 24 Hrs  T(C): 36 (20 Mar 2019 11:40), Max: 37.2 (19 Mar 2019 22:26)  T(F): 96.8 (20 Mar 2019 11:40), Max: 98.9 (19 Mar 2019 22:26)  HR: 79 (20 Mar 2019 11:40) (75 - 84)  BP: 144/68 (20 Mar 2019 11:40) (119/68 - 182/87)  BP(mean): --  RR: 18 (20 Mar 2019 11:40) (16 - 19)  SpO2: 99% (20 Mar 2019 11:40) (96% - 100%)    PHYSICAL EXAM:  GENERAL: NAD, well-groomed, well-developed  HEAD:  Atraumatic, Normocephalic  EYES: EOMI, PERRLA, conjunctiva and sclera clear  ENMT: No tonsillar erythema, exudates, or enlargement; Moist mucous membranes, Good dentition, No lesions  NECK: Supple, No JVD, Normal thyroid  NERVOUS SYSTEM:  Alert & Oriented X3, Good concentration; Motor Strength 5/5 B/L upper and lower extremities; DTRs 2+ intact and symmetric  CHEST/LUNG: Clear to auscultation bilaterally; No rales, rhonchi, wheezing, or rubs  HEART: Regular rate and rhythm; No murmurs, rubs, or gallops  ABDOMEN: Soft, Nontender, Nondistended; Bowel sounds present  EXTREMITIES:  2+ Peripheral Pulses, No clubbing, cyanosis, or edema  LYMPH: No lymphadenopathy noted  SKIN: No rashes or lesions    LABS:                        8.7    12.69 )-----------( 243      ( 20 Mar 2019 08:17 )             28.3         133<L>  |  95<L>  |  90<H>  ----------------------------<  101<H>  2.3<LL>   |  19<L>  |  4.08<H>    Ca    6.7<L>      20 Mar 2019 08:17  Phos  2.3       Mg     3.2         TPro  6.4  /  Alb  3.0<L>  /  TBili  0.4  /  DBili  x   /  AST  27  /  ALT  15  /  AlkPhos  102      PT/INR - ( 19 Mar 2019 18:14 )   PT: 10.4 sec;   INR: 0.93 ratio         PTT - ( 19 Mar 2019 18:14 )  PTT:22.2 sec  Urinalysis Basic - ( 19 Mar 2019 22:18 )    Color: Yellow / Appearance: Clear / S.010 / pH: x  Gluc: x / Ketone: Small  / Bili: Negative / Urobili: Negative mg/dL   Blood: x / Protein: 100 mg/dL / Nitrite: Negative   Leuk Esterase: Negative / RBC: 0-2 /HPF / WBC x   Sq Epi: x / Non Sq Epi: Occasional / Bacteria: Moderate      CAPILLARY BLOOD GLUCOSE          RADIOLOGY & ADDITIONAL TESTS:    19 @ 07:01  -  19 @ 07:00  --------------------------------------------------------  IN:  Total IN: 0 mL    OUT:    Voided: 450 mL  Total OUT: 450 mL    Total NET: -450 mL 58 year old man with no PMH (per him), former ETOH abuser presents to ED with complaint of abdominal pain, Back pain & n/v and was found to have   Acute on chronic pancreatitis, YUNIEL & hypokalemia. He is lying in bed in NAD.    MEDICATIONS  (STANDING):  amLODIPine   Tablet 5 milliGRAM(s) Oral daily  potassium acid phosphate/sodium acid phosphate tablet (K-PHOS No. 2) 1 Tablet(s) Oral four times a day with meals  sodium chloride 0.9%. 1000 milliLiter(s) (100 mL/Hr) IV Continuous <Continuous>    MEDICATIONS  (PRN):  acetaminophen   Tablet .. 650 milliGRAM(s) Oral every 6 hours PRN Temp greater or equal to 38C (100.4F), Mild Pain (1 - 3)  morphine  - Injectable 2 milliGRAM(s) IV Push every 6 hours PRN Severe Pain (7 - 10)  ondansetron Injectable 8 milliGRAM(s) IV Push every 8 hours PRN Nausea and/or Vomiting  oxyCODONE    5 mG/acetaminophen 325 mG 1 Tablet(s) Oral every 6 hours PRN Moderate Pain (4 - 6)      Allergies    No Known Allergies    Intolerances        Vital Signs Last 24 Hrs  T(C): 36 (20 Mar 2019 11:40), Max: 37.2 (19 Mar 2019 22:26)  T(F): 96.8 (20 Mar 2019 11:40), Max: 98.9 (19 Mar 2019 22:26)  HR: 79 (20 Mar 2019 11:40) (75 - 84)  BP: 144/68 (20 Mar 2019 11:40) (119/68 - 182/87)  BP(mean): --  RR: 18 (20 Mar 2019 11:40) (16 - 19)  SpO2: 99% (20 Mar 2019 11:40) (96% - 100%)    PHYSICAL EXAM:  GENERAL: NAD, well-groomed, well-developed  HEAD:  Atraumatic, Normocephalic  EYES: EOMI, PERRLA   NECK: Supple   NERVOUS SYSTEM: Alert, no tremors  CHEST/LUNG: Clear to auscultation bilaterally; No rales, rhonchi, wheezing, or rubs  HEART: Regular rate and rhythm; No murmurs, rubs, or gallops  ABDOMEN: Soft, Nontender, Nondistended; Bowel sounds present  EXTREMITIES: No clubbing, cyanosis, or edema    LABS:                        8.7    12.69 )-----------( 243      ( 20 Mar 2019 08:17 )             28.3         133<L>  |  95<L>  |  90<H>  ----------------------------<  101<H>  2.3<LL>   |  19<L>  |  4.08<H>    Ca    6.7<L>      20 Mar 2019 08:17  Phos  2.3       Mg     3.2         TPro  6.4  /  Alb  3.0<L>  /  TBili  0.4  /  DBili  x   /  AST  27  /  ALT  15  /  AlkPhos  102      PT/INR - ( 19 Mar 2019 18:14 )   PT: 10.4 sec;   INR: 0.93 ratio         PTT - ( 19 Mar 2019 18:14 )  PTT:22.2 sec  Urinalysis Basic - ( 19 Mar 2019 22:18 )    Color: Yellow / Appearance: Clear / S.010 / pH: x  Gluc: x / Ketone: Small  / Bili: Negative / Urobili: Negative mg/dL   Blood: x / Protein: 100 mg/dL / Nitrite: Negative   Leuk Esterase: Negative / RBC: 0-2 /HPF / WBC x   Sq Epi: x / Non Sq Epi: Occasional / Bacteria: Moderate       RADIOLOGY & ADDITIONAL TESTS:    19 @ 07:01  -  19 @ 07:00  --------------------------------------------------------  IN:  Total IN: 0 mL    OUT:    Voided: 450 mL  Total OUT: 450 mL    Total NET: -450 mL

## 2019-03-20 NOTE — PROGRESS NOTE ADULT - ASSESSMENT
58 year old man with no PMH (per him), former ETOH abuser presents to ED with complaint of abdominal pain, Back pain & n/v and was found to have   Acute on chronic pancreatitis, YUNIEL & hypokalemia.    Acute on chronic pancreatitis  - CT showed numerous calcifications in the pancreatic head and pancreatic body w/ dilatation of the pancreatic duct within the pancreatic body and tail c/w  chronic pancreatitis.   - c/w IVF hydration  - clear liquid diet  - Analgesia PRN  - given high BNP, will get ECHO before increasing IVF rate    Hypokalemia & Hypophosphatemia  - started KPhos  - c/w IV & PO KCl     Anemia  - Hgb was 6 on admission  - Hgb 8.7 post 2 units pRBC  - Retic Count normal, B12 and Folate are still pending  - TIBC, Ferritin,  haptoglobin, LDH pending  - check lipid panel    YUNIEL   - suspect underlying CKD, but no baseline is available   - c/w IVF  - nephrology consulted    HTN  - Norvasc started    Prophylaxis:   DVT: SCD  GI: PO diet 58 year old man with no PMH (per him), former ETOH abuser presents to ED with complaint of abdominal pain, Back pain & n/v and was found to have   Acute on chronic pancreatitis, YUNIEL & hypokalemia.    Acute on chronic pancreatitis  - CT showed numerous calcifications in the pancreatic head and pancreatic body w/ dilatation of the pancreatic duct within the pancreatic body and tail c/w  chronic pancreatitis.   - c/w IVF hydration  - clear liquid diet  - Analgesia PRN  - given high BNP, will get ECHO before increasing IVF rate  - triglyceride WNL   - GI ordered abd US    Hypokalemia & Hypophosphatemia  - started KPhos  - c/w IV & PO KCl     Anemia  - Hgb was 6 on admission  - Hgb 8.7 post 2 units pRBC  - Retic Count normal, B12 and Folate are still pending  - iron panel showed inflammatory anemia     ETOH use history  - c/w thiamine and add folic acid and MVN  - watch for withdrawal     YUNIEL   - suspect underlying CKD, but no baseline is available   - c/w IVF  - nephrology consulted    HTN  - Norvasc started    HCV  - patient says he's aware that he has Hep C and was being treated as outpatient     Prophylaxis:   DVT: SCD  GI: PO diet

## 2019-03-20 NOTE — CONSULT NOTE ADULT - PROBLEM SELECTOR RECOMMENDATION 9
Hydration per renal  Follow Serum amylase and lipase  Lipid panel  Abdominal sonogram  once tolerating regular diet will need Creon 28070 units TID with meals

## 2019-03-20 NOTE — DIETITIAN INITIAL EVALUATION ADULT. - PERTINENT LABORATORY DATA
03-20 Na133 mmol/L<L> Glu 101 mg/dL<H> K+ 2.3 mmol/L<LL> Cr  4.08 mg/dL<H> BUN 90 mg/dL<H> 03-20 Phos 2.3 mg/dL<L> 03-20 Alb 3.0 g/dL<L> 03-20 Na133 mmol/L<L> Glu 101 mg/dL<H> K+ 2.3 mmol/L<LL> Cr  4.08 mg/dL<H> BUN 90 mg/dL<H> 03-20 Phos 2.3 mg/dL<L> 03-20 Alb 3.0 g/dL<L> Lipase 2260 <H>

## 2019-03-20 NOTE — DIETITIAN INITIAL EVALUATION ADULT. - PHYSICAL APPEARANCE
BMI=16.7; no edema; Nutrition focused physical exam conducted:  Subcutaneous fat loss: [moderate ] Orbital fat pads region, [WDL ]Buccal fat region, [severe ]Triceps region,  [severe ]Ribs region.  Muscle wasting: [mild ]Temples region, [moderate ]Clavicle region, [moderate ]Shoulder region, [severe ]Scapula region, [moderate ]Interosseous region,  [unable to lift leg ]thigh region, [unable to lift leg ]Calf region/emaciated

## 2019-03-20 NOTE — CONSULT NOTE ADULT - SUBJECTIVE AND OBJECTIVE BOX
Chief Complaint:  Patient is a 58y old  Male who presents with a chief complaint of pancreatitis, anemia, abnormal renal fxn (19 Mar 2019 21:52)      HPI:  58 year old man with no PMH (per him), former ETOH abuser presents to ED with complaint of abdominal pain with Back pain.  He is AAO x 3 but does not seem to be a reliable historian regarding PMH.  He does not know any medications (says "they all don't work, whatever they are, I don't take them").  In addition to his abdominal pain and Back pain he complains of nausea and vomiting.  He offers no other complaints.    In the ED, pt's h/h /.7, BMP consistent with renal failure without baseline, K 2.4, supplemented IV in ED.  EKG NSR.  Lipase 1404, BNP 3818.  CT imaging consistent with chronic pancreatitis. (19 Mar 2019 21:52)      PMH/PSH:PAST MEDICAL & SURGICAL HISTORY:      Allergies:  No Known Allergies      Medications:  acetaminophen   Tablet .. 650 milliGRAM(s) Oral every 6 hours PRN  amLODIPine   Tablet 5 milliGRAM(s) Oral daily  morphine  - Injectable 2 milliGRAM(s) IV Push every 6 hours PRN  ondansetron Injectable 8 milliGRAM(s) IV Push every 8 hours PRN  oxyCODONE    5 mG/acetaminophen 325 mG 1 Tablet(s) Oral every 6 hours PRN  potassium acid phosphate/sodium acid phosphate tablet (K-PHOS No. 2) 1 Tablet(s) Oral four times a day with meals  sodium chloride 0.9%. 1000 milliLiter(s) IV Continuous <Continuous>      REVIEW OF SYSTEMS:    CONSTITUTIONAL: No weakness, fevers or chills  EYES/ENT: No visual changes;  No vertigo or throat pain   NECK: No pain or stiffness  RESPIRATORY: No cough, wheezing, hemoptysis; No shortness of breath  CARDIOVASCULAR: No chest pain or palpitations  GASTROINTESTINAL: No abdominal or epigastric pain. No nausea, vomiting, or hematemesis; No diarrhea or constipation. No melena or hematochezia.  GENITOURINARY: No dysuria, frequency or hematuria  NEUROLOGICAL: No numbness or weakness  SKIN: No itching, burning, rashes, or lesions   All other review of systems is negative unless indicated above.  Relevant Family History:   FAMILY HISTORY:      Relevant Social History:  Denies ETOH or tobacco history    Physical Exam:    Vital Signs:  Vital Signs Last 24 Hrs  T(C): 36 (20 Mar 2019 11:40), Max: 37.2 (19 Mar 2019 22:26)  T(F): 96.8 (20 Mar 2019 11:40), Max: 98.9 (19 Mar 2019 22:26)  HR: 79 (20 Mar 2019 11:40) (75 - 84)  BP: 144/68 (20 Mar 2019 11:40) (119/68 - 182/87)  BP(mean): --  RR: 18 (20 Mar 2019 11:40) (16 - 19)  SpO2: 99% (20 Mar 2019 11:40) (96% - 100%)  Daily Height in cm: 185.42 (19 Mar 2019 23:04)    Daily Weight in k.4 (20 Mar 2019 10:41)    Constitutional: WDWN resting comfortably in bed; NAD  HEENT: NC/AT, PERRL, EOMI, anicteric sclera, no nasal discharge; uvula midline, no oropharyngeal erythema or exudates  Neck: supple; no JVD or thyromegaly  Respiratory: CTA B/L; no W/R/R, no retractions  Cardiac: +S1/S2; RRR; no M/R/G; PMI non-displaced  Gastrointestinal: soft, NT/ND; no rebound or guarding; +BS   Extremities: , no clubbing or cyanosis; no peripheral edema  Musculoskeletal:  no joint swelling, tenderness or erythema  Vascular: 2+ radial, femoral, DP/PT pulses B/L  Skin: warm, dry and intact; no rashes, wounds, or scars  Neurologic: AAOx3; CNS grossly intact; no focal deficits no asterixis, no tremor, no encephalopathy    Laboratory:                          8.7    12.69 )-----------( 243      ( 20 Mar 2019 08:17 )             28.3     03-20    133<L>  |  95<L>  |  90<H>  ----------------------------<  101<H>  2.3<LL>   |  19<L>  |  4.08<H>    Ca    6.7<L>      20 Mar 2019 08:17  Phos  2.3     03-20  Mg     3.2     03-20    TPro  6.4  /  Alb  3.0<L>  /  TBili  0.4  /  DBili  x   /  AST  27  /  ALT  15  /  AlkPhos  102      LIVER FUNCTIONS - ( 20 Mar 2019 08:17 )  Alb: 3.0 g/dL / Pro: 6.4 gm/dL / ALK PHOS: 102 U/L / ALT: 15 U/L / AST: 27 U/L / GGT: x           PT/INR - ( 19 Mar 2019 18:14 )   PT: 10.4 sec;   INR: 0.93 ratio         PTT - ( 19 Mar 2019 18:14 )  PTT:22.2 sec  Urinalysis Basic - ( 19 Mar 2019 22:18 )    Color: Yellow / Appearance: Clear / S.010 / pH: x  Gluc: x / Ketone: Small  / Bili: Negative / Urobili: Negative mg/dL   Blood: x / Protein: 100 mg/dL / Nitrite: Negative   Leuk Esterase: Negative / RBC: 0-2 /HPF / WBC x   Sq Epi: x / Non Sq Epi: Occasional / Bacteria: Moderate      Lipase [id number] U/L<H>       Lipase [id number] U/L<H>       Intake and Output    19 @ 07:01  -  19 @ 07:00  --------------------------------------------------------  IN: 0 mL / OUT: 450 mL / NET: -450 mL        Imaging:    EXAM:  CT ABDOMEN AND PELVIS OC                          PROCEDURE DATE:  2019      INTERPRETATION:  Exam Type:  CT ABDOMEN AND PELVIS OC   Date and Time: 3/19/2019 7:21 PM  Indication: abdominal pain, elevated lipase   Compared to: no previous  Technique: CT of the ABDOMEN and PELVIS:  No intravenous contrast was   administered at physician request. This limits sensitivity for certain   processes. Oral contrast was not administered.    COMMENTS:    LOWER LUNGS AND PLEURA: within normal limits.    LIVER: Hepatomegaly. No focal hepatic masses.  BILE DUCTS: normal caliber.  GALLBLADDER:      no wall thickening. Gallstones are not excluded.  PANCREAS: Numerous calcifications in the pancreatic head and pancreatic   body. Dilatation of the pancreatic duct within the pancreatic body and   tail. Findings likely representing chronic pancreatitis. Limited   evaluation for focal mass secondary to lack of IV contrast. If concern   for focal mass, repeat CT or MRI with and without contrast can be   obtained.  SPLEEN: within normal limits.  ADRENALS: within normal limits.    KIDNEYS, URETERS AND BLADDER: within normal limits.  PELVIS: no pelvic masses.No pelvic lymphadenopathy.    BOWEL: The unopacified bowel is of normal course and caliber without   evidence of obstruction or bowel wall thickening. A normal appendix is   visualized.   PERITONEUM: no ascites or free air, no fluid collection.  RETROPERITONEUM: within normal limits.      VESSELS: atherosclerotic changes.      ABDOMINAL WALL: within normal limits.  BONES: within normal limits.     IMPRESSION:     Numerous calcifications in the pancreatic head and pancreatic body.   Dilatation of the pancreatic duct within the pancreatic body and tail.   Findings likely representing chronic pancreatitis. Limited evaluation for   focal mass secondary to lack of IV contrast. If concern for focal mass,   repeat CT or MRI with and without contrast can be obtained.  No definite   CT findings of acute pancreatitis.      LACEY ELLIS M.D., ATTENDING RADIOLOGIST  This document has been electronically signed. Mar 19 2019  7:56PM

## 2019-03-20 NOTE — DIETITIAN INITIAL EVALUATION ADULT. - OTHER INFO
Pt seen for decrease po intake >5 days.  Pt is homeless and [facility], eats three meals a day when he can.  Pt stated he has lost weight over time, but unable to give an accurate timeline of weight change.  No complaints of n/v/d/c or chewing problems, but has pain when swallowing and abdominal pain.  Pt stated he has recently stopped drinking. Pt seen for decrease po intake >5 days.  Pt is homeless and gets most food from the [facility], eats three meals a day when he can.  Pt stated he has lost weight over time, but unable to give an accurate timeline of weight change. Pt complained of throat pain and abdominal pain with <50% oral intake at present.  Pt stated he has recently stopped drinking. Pt seen for significant decrease po intake >5 days.  Pt is homeless and gets most food from the mission, eats three meals a day when he can.  Pt stated he has lost weight over time, but unable to give an accurate timeline of weight change. Pt complained of throat pain and abdominal pain with <50% oral intake at present.  Pt stated he has recently stopped drinking.

## 2019-03-20 NOTE — DIETITIAN INITIAL EVALUATION ADULT. - NS AS NUTRI INTERV PARENTERAL
If pt unable to tolerate oral feeding, consider alternate means of nutritional support like PPN/TPN./Composition

## 2019-03-21 DIAGNOSIS — R41.0 DISORIENTATION, UNSPECIFIED: ICD-10-CM

## 2019-03-21 LAB
ALBUMIN SERPL ELPH-MCNC: 2.7 G/DL — LOW (ref 3.3–5)
ALP SERPL-CCNC: 93 U/L — SIGNIFICANT CHANGE UP (ref 40–120)
ALT FLD-CCNC: 15 U/L — SIGNIFICANT CHANGE UP (ref 12–78)
AMYLASE P1 CFR SERPL: 1007 U/L — HIGH (ref 25–115)
ANION GAP SERPL CALC-SCNC: 11 MMOL/L — SIGNIFICANT CHANGE UP (ref 5–17)
AST SERPL-CCNC: 30 U/L — SIGNIFICANT CHANGE UP (ref 15–37)
BILIRUB SERPL-MCNC: 0.3 MG/DL — SIGNIFICANT CHANGE UP (ref 0.2–1.2)
BUN SERPL-MCNC: 71 MG/DL — HIGH (ref 7–23)
CALCIUM SERPL-MCNC: 7.6 MG/DL — LOW (ref 8.5–10.1)
CHLORIDE SERPL-SCNC: 105 MMOL/L — SIGNIFICANT CHANGE UP (ref 96–108)
CO2 SERPL-SCNC: 18 MMOL/L — LOW (ref 22–31)
CREAT ?TM UR-MCNC: 65 MG/DL — SIGNIFICANT CHANGE UP
CREAT SERPL-MCNC: 3.07 MG/DL — HIGH (ref 0.5–1.3)
CULTURE RESULTS: SIGNIFICANT CHANGE UP
EOSINOPHIL NFR URNS MANUAL: NEGATIVE — SIGNIFICANT CHANGE UP
GLUCOSE SERPL-MCNC: 96 MG/DL — SIGNIFICANT CHANGE UP (ref 70–99)
HCT VFR BLD CALC: 28.4 % — LOW (ref 39–50)
HEMOGLOBIN INTERPRETATION: SIGNIFICANT CHANGE UP
HGB A MFR BLD: 98.6 % — HIGH (ref 95.8–98)
HGB A2 MFR BLD: 1.4 % — LOW (ref 2–3.2)
HGB BLD-MCNC: 8.6 G/DL — LOW (ref 13–17)
LIDOCAIN IGE QN: 6190 U/L — HIGH (ref 73–393)
MAGNESIUM SERPL-MCNC: 2.8 MG/DL — HIGH (ref 1.6–2.6)
MCHC RBC-ENTMCNC: 24.1 PG — LOW (ref 27–34)
MCHC RBC-ENTMCNC: 30.3 GM/DL — LOW (ref 32–36)
MCV RBC AUTO: 79.6 FL — LOW (ref 80–100)
NRBC # BLD: 0 /100 WBCS — SIGNIFICANT CHANGE UP (ref 0–0)
PHOSPHATE SERPL-MCNC: 1 MG/DL — CRITICAL LOW (ref 2.5–4.5)
PLATELET # BLD AUTO: 208 K/UL — SIGNIFICANT CHANGE UP (ref 150–400)
POTASSIUM SERPL-MCNC: 2.7 MMOL/L — CRITICAL LOW (ref 3.5–5.3)
POTASSIUM SERPL-SCNC: 2.7 MMOL/L — CRITICAL LOW (ref 3.5–5.3)
POTASSIUM UR-SCNC: 6.1 MMOL/L — SIGNIFICANT CHANGE UP
PROT ?TM UR-MCNC: 42 MG/DL — HIGH (ref 0–12)
PROT SERPL-MCNC: 5.8 GM/DL — LOW (ref 6–8.3)
RBC # BLD: 3.57 M/UL — LOW (ref 4.2–5.8)
RBC # FLD: 18.2 % — HIGH (ref 10.3–14.5)
SODIUM SERPL-SCNC: 134 MMOL/L — LOW (ref 135–145)
SPECIMEN SOURCE: SIGNIFICANT CHANGE UP
WBC # BLD: 10.63 K/UL — HIGH (ref 3.8–10.5)
WBC # FLD AUTO: 10.63 K/UL — HIGH (ref 3.8–10.5)

## 2019-03-21 PROCEDURE — 99233 SBSQ HOSP IP/OBS HIGH 50: CPT

## 2019-03-21 PROCEDURE — 99223 1ST HOSP IP/OBS HIGH 75: CPT

## 2019-03-21 PROCEDURE — 76700 US EXAM ABDOM COMPLETE: CPT | Mod: 26

## 2019-03-21 PROCEDURE — 93306 TTE W/DOPPLER COMPLETE: CPT | Mod: 26

## 2019-03-21 RX ORDER — NICOTINE POLACRILEX 2 MG
1 GUM BUCCAL DAILY
Qty: 0 | Refills: 0 | Status: DISCONTINUED | OUTPATIENT
Start: 2019-03-21 | End: 2019-03-23

## 2019-03-21 RX ORDER — POTASSIUM PHOSPHATE, MONOBASIC POTASSIUM PHOSPHATE, DIBASIC 236; 224 MG/ML; MG/ML
15 INJECTION, SOLUTION INTRAVENOUS ONCE
Qty: 0 | Refills: 0 | Status: COMPLETED | OUTPATIENT
Start: 2019-03-21 | End: 2019-03-21

## 2019-03-21 RX ORDER — POTASSIUM CHLORIDE 20 MEQ
40 PACKET (EA) ORAL EVERY 4 HOURS
Qty: 0 | Refills: 0 | Status: COMPLETED | OUTPATIENT
Start: 2019-03-21 | End: 2019-03-21

## 2019-03-21 RX ORDER — SODIUM CHLORIDE 9 MG/ML
1000 INJECTION, SOLUTION INTRAVENOUS
Qty: 0 | Refills: 0 | Status: DISCONTINUED | OUTPATIENT
Start: 2019-03-21 | End: 2019-03-23

## 2019-03-21 RX ORDER — AMLODIPINE BESYLATE 2.5 MG/1
10 TABLET ORAL DAILY
Qty: 0 | Refills: 0 | Status: DISCONTINUED | OUTPATIENT
Start: 2019-03-22 | End: 2019-03-29

## 2019-03-21 RX ORDER — POTASSIUM CHLORIDE 20 MEQ
10 PACKET (EA) ORAL
Qty: 0 | Refills: 0 | Status: COMPLETED | OUTPATIENT
Start: 2019-03-21 | End: 2019-03-21

## 2019-03-21 RX ADMIN — Medication 40 MILLIEQUIVALENT(S): at 18:24

## 2019-03-21 RX ADMIN — OXYCODONE AND ACETAMINOPHEN 1 TABLET(S): 5; 325 TABLET ORAL at 01:04

## 2019-03-21 RX ADMIN — Medication 100 MILLIEQUIVALENT(S): at 17:21

## 2019-03-21 RX ADMIN — SODIUM CHLORIDE 100 MILLILITER(S): 9 INJECTION INTRAMUSCULAR; INTRAVENOUS; SUBCUTANEOUS at 05:51

## 2019-03-21 RX ADMIN — OXYCODONE AND ACETAMINOPHEN 1 TABLET(S): 5; 325 TABLET ORAL at 07:57

## 2019-03-21 RX ADMIN — SODIUM CHLORIDE 150 MILLILITER(S): 9 INJECTION, SOLUTION INTRAVENOUS at 22:31

## 2019-03-21 RX ADMIN — OXYCODONE AND ACETAMINOPHEN 1 TABLET(S): 5; 325 TABLET ORAL at 02:00

## 2019-03-21 RX ADMIN — OXYCODONE AND ACETAMINOPHEN 1 TABLET(S): 5; 325 TABLET ORAL at 08:57

## 2019-03-21 RX ADMIN — AMLODIPINE BESYLATE 5 MILLIGRAM(S): 2.5 TABLET ORAL at 05:51

## 2019-03-21 RX ADMIN — MORPHINE SULFATE 15 MILLIGRAM(S): 50 CAPSULE, EXTENDED RELEASE ORAL at 06:36

## 2019-03-21 RX ADMIN — Medication 40 MILLIEQUIVALENT(S): at 22:30

## 2019-03-21 RX ADMIN — Medication 1 PATCH: at 19:55

## 2019-03-21 RX ADMIN — MORPHINE SULFATE 15 MILLIGRAM(S): 50 CAPSULE, EXTENDED RELEASE ORAL at 18:24

## 2019-03-21 RX ADMIN — MORPHINE SULFATE 2 MILLIGRAM(S): 50 CAPSULE, EXTENDED RELEASE ORAL at 13:42

## 2019-03-21 RX ADMIN — Medication 100 MILLIEQUIVALENT(S): at 12:44

## 2019-03-21 RX ADMIN — MORPHINE SULFATE 2 MILLIGRAM(S): 50 CAPSULE, EXTENDED RELEASE ORAL at 04:29

## 2019-03-21 RX ADMIN — POTASSIUM PHOSPHATE, MONOBASIC POTASSIUM PHOSPHATE, DIBASIC 62.5 MILLIMOLE(S): 236; 224 INJECTION, SOLUTION INTRAVENOUS at 17:22

## 2019-03-21 RX ADMIN — MORPHINE SULFATE 15 MILLIGRAM(S): 50 CAPSULE, EXTENDED RELEASE ORAL at 05:51

## 2019-03-21 RX ADMIN — Medication 1 PATCH: at 14:31

## 2019-03-21 RX ADMIN — MORPHINE SULFATE 15 MILLIGRAM(S): 50 CAPSULE, EXTENDED RELEASE ORAL at 19:30

## 2019-03-21 RX ADMIN — ONDANSETRON 8 MILLIGRAM(S): 8 TABLET, FILM COATED ORAL at 13:08

## 2019-03-21 RX ADMIN — Medication 40 MILLIEQUIVALENT(S): at 12:45

## 2019-03-21 RX ADMIN — SODIUM CHLORIDE 150 MILLILITER(S): 9 INJECTION, SOLUTION INTRAVENOUS at 12:43

## 2019-03-21 RX ADMIN — Medication 100 MILLIEQUIVALENT(S): at 15:32

## 2019-03-21 RX ADMIN — MORPHINE SULFATE 2 MILLIGRAM(S): 50 CAPSULE, EXTENDED RELEASE ORAL at 04:50

## 2019-03-21 RX ADMIN — MORPHINE SULFATE 2 MILLIGRAM(S): 50 CAPSULE, EXTENDED RELEASE ORAL at 12:42

## 2019-03-21 NOTE — PROGRESS NOTE ADULT - SUBJECTIVE AND OBJECTIVE BOX
Gastroenterology  Patient seen and examined bedside resting comfortably.  +abdominal pain  Denies nausea and vomiting. Tolerating diet.  Normal flatus/BM.     T(F): 97.5 (03-21-19 @ 05:40), Max: 97.5 (03-21-19 @ 05:40)  HR: 76 (03-21-19 @ 05:40) (73 - 79)  BP: 169/78 (03-21-19 @ 05:40) (144/68 - 169/78)  RR: 18 (03-21-19 @ 05:40) (17 - 18)  SpO2: 100% (03-21-19 @ 05:40) (99% - 100%)  Wt(kg): --  CAPILLARY BLOOD GLUCOSE          PHYSICAL EXAM:  General: NAD, WDWN.   Neuro:  Alert & responsive  HEENT: NCAT, EOMI, conjunctiva clear  CV: +S1+S2 regular rate and rhythm  Lung: clear to ausculation bilaterally, respirations nonlabored, good inspiratory effort  Abdomen: soft, Non Tender, No distention Normal active BS  Extremities: no cyanosis, clubbing or edema    LABS:                        8.7    12.69 )-----------( 243      ( 20 Mar 2019 08:17 )             28.3     03-20    133<L>  |  95<L>  |  90<H>  ----------------------------<  101<H>  2.3<LL>   |  19<L>  |  4.08<H>    Ca    6.7<L>      20 Mar 2019 08:17  Phos  2.3     03-20  Mg     3.2     03-20    TPro  6.4  /  Alb  3.0<L>  /  TBili  0.4  /  DBili  x   /  AST  27  /  ALT  15  /  AlkPhos  102  03-20    LIVER FUNCTIONS - ( 20 Mar 2019 08:17 )  Alb: 3.0 g/dL / Pro: 6.4 gm/dL / ALK PHOS: 102 U/L / ALT: 15 U/L / AST: 27 U/L / GGT: x           PT/INR - ( 19 Mar 2019 18:14 )   PT: 10.4 sec;   INR: 0.93 ratio         PTT - ( 19 Mar 2019 18:14 )  PTT:22.2 sec  I&O's Detail    20 Mar 2019 07:01  -  21 Mar 2019 07:00  --------------------------------------------------------  IN:    Oral Fluid: 1029 mL    sodium chloride 0.9%.: 1200 mL  Total IN: 2229 mL    OUT:    Voided: 500 mL  Total OUT: 500 mL    Total NET: 1729 mL        03-20 @ 08:17    133 | 95 | 90  /6.7 | 3.2 | 2.3  _______________________/  2.3 | 19 | 4.08                           \par   Vitamin B12, Serum: 301 pg/mL (03-20 @ 11:28)

## 2019-03-21 NOTE — PROGRESS NOTE ADULT - SUBJECTIVE AND OBJECTIVE BOX
58 year old man with no PMH (per him), former ETOH abuser presents to ED with complaint of abdominal pain, Back pain & n/v and was found to have   Acute on chronic pancreatitis, YUNIEL & hypokalemia. He is lying in bed in NAD. He wanted to leave AMA this morning but was convinced to stay. Psych was consulted and determined he did not have capacity.     MEDICATIONS  (STANDING):  amLODIPine   Tablet 5 milliGRAM(s) Oral daily  folic acid 1 milliGRAM(s) Oral daily  morphine ER Tablet 15 milliGRAM(s) Oral every 12 hours  multivitamin/minerals 1 Tablet(s) Oral daily  nicotine -  14 mG/24Hr(s) Patch 1 patch Transdermal daily  potassium chloride    Tablet ER 40 milliEquivalent(s) Oral every 4 hours  sodium chloride 0.9% 1000 milliLiter(s) (150 mL/Hr) IV Continuous <Continuous>  thiamine 100 milliGRAM(s) Oral daily    MEDICATIONS  (PRN):  LORazepam     Tablet 2 milliGRAM(s) Oral every 6 hours PRN Anxiety  morphine  - Injectable 2 milliGRAM(s) IV Push every 6 hours PRN Severe Pain (7 - 10)  ondansetron Injectable 8 milliGRAM(s) IV Push every 8 hours PRN Nausea and/or Vomiting  oxyCODONE    5 mG/acetaminophen 325 mG 1 Tablet(s) Oral every 6 hours PRN Moderate Pain (4 - 6)      Allergies    No Known Allergies    Intolerances        Vital Signs Last 24 Hrs  T(C): 36.7 (21 Mar 2019 17:29), Max: 36.9 (21 Mar 2019 12:47)  T(F): 98 (21 Mar 2019 17:29), Max: 98.4 (21 Mar 2019 12:47)  HR: 84 (21 Mar 2019 17:29) (73 - 84)  BP: 176/71 (21 Mar 2019 17:29) (134/71 - 176/71)  BP(mean): --  RR: 18 (21 Mar 2019 17:29) (18 - 18)  SpO2: 100% (21 Mar 2019 17:29) (100% - 100%)    PHYSICAL EXAM:  GENERAL: NAD, well-groomed, well-developed  HEAD:  Atraumatic, Normocephalic  EYES: EOMI, PERRLA   NECK: Supple   NERVOUS SYSTEM: Alert, no tremors  CHEST/LUNG: Clear to auscultation bilaterally; No rales, rhonchi, wheezing, or rubs  HEART: Regular rate and rhythm; No murmurs, rubs, or gallops  ABDOMEN: Soft, Nontender, Nondistended; Bowel sounds present  EXTREMITIES: No clubbing, cyanosis, or edema      LABS:                        8.6    10.63 )-----------( 208      ( 21 Mar 2019 08:11 )             28.4         134<L>  |  105  |  71<H>  ----------------------------<  96  2.7<LL>   |  18<L>  |  3.07<H>    Ca    7.6<L>      21 Mar 2019 08:11  Phos  1.0       Mg     2.8         TPro  5.8<L>  /  Alb  2.7<L>  /  TBili  0.3  /  DBili  x   /  AST  30  /  ALT  15  /  AlkPhos  93        Urinalysis Basic - ( 19 Mar 2019 22:18 )    Color: Yellow / Appearance: Clear / S.010 / pH: x  Gluc: x / Ketone: Small  / Bili: Negative / Urobili: Negative mg/dL   Blood: x / Protein: 100 mg/dL / Nitrite: Negative   Leuk Esterase: Negative / RBC: 0-2 /HPF / WBC x   Sq Epi: x / Non Sq Epi: Occasional / Bacteria: Moderate      CAPILLARY BLOOD GLUCOSE          Culture - Urine (collected 20 Mar 2019 10:40)  Source: .Urine  Final Report (21 Mar 2019 19:09):    <10,000 CFU/mL Normal Urogenital Mariah      RADIOLOGY & ADDITIONAL TESTS:    19 @ 07:01  -  19 @ 07:00  --------------------------------------------------------  IN:    Oral Fluid: 1029 mL    sodium chloride 0.9%: 1200 mL  Total IN: 2229 mL    OUT:    Voided: 500 mL  Total OUT: 500 mL    Total NET: 1729 mL      19 @ 07:01  -  19 @ 20:05  --------------------------------------------------------  IN:    Oral Fluid: 100 mL  Total IN: 100 mL    OUT:  Total OUT: 0 mL    Total NET: 100 mL

## 2019-03-21 NOTE — PROGRESS NOTE ADULT - SUBJECTIVE AND OBJECTIVE BOX
Subjective: offers no complaints. Drowsy, somnolent. Incoherent responses to questions.       MEDICATIONS  (STANDING):  amLODIPine   Tablet 5 milliGRAM(s) Oral daily  folic acid 1 milliGRAM(s) Oral daily  morphine ER Tablet 15 milliGRAM(s) Oral every 12 hours  multivitamin/minerals 1 Tablet(s) Oral daily  potassium chloride    Tablet ER 40 milliEquivalent(s) Oral every 4 hours  sodium chloride 0.9% 1000 milliLiter(s) (150 mL/Hr) IV Continuous <Continuous>  thiamine 100 milliGRAM(s) Oral daily    MEDICATIONS  (PRN):  morphine  - Injectable 2 milliGRAM(s) IV Push every 6 hours PRN Severe Pain (7 - 10)  ondansetron Injectable 8 milliGRAM(s) IV Push every 8 hours PRN Nausea and/or Vomiting  oxyCODONE    5 mG/acetaminophen 325 mG 1 Tablet(s) Oral every 6 hours PRN Moderate Pain (4 - 6)          T(C): 36.4 (19 @ 05:40), Max: 36.4 (19 @ 05:40)  HR: 76 (19 @ 05:40) (73 - 78)  BP: 169/78 (19 @ 05:40) (146/74 - 169/78)  RR: 18 (19 @ 05:40) (18 - 18)  SpO2: 100% (19 @ 05:40) (99% - 100%)  Wt(kg): --        I&O's Detail    20 Mar 2019 07:01  -  21 Mar 2019 07:00  --------------------------------------------------------  IN:    Oral Fluid: 1029 mL    sodium chloride 0.9%: 1200 mL  Total IN: 2229 mL    OUT:    Voided: 500 mL  Total OUT: 500 mL    Total NET: 1729 mL               PHYSICAL EXAM:    GENERAL: somnolent.   EYES: EOMI, PERRLA, conjunctiva and sclera clear  NECK: Supple, no inc in JVP  CHEST/LUNG: Clear  HEART: S1S2  ABDOMEN: Soft, Nontender, Nondistended; Bowel sounds present  EXTREMITIES:  no edema  NEURO: no asterixis      LABS:  CBC Full  -  ( 21 Mar 2019 08:11 )  WBC Count : 10.63 K/uL  Hemoglobin : 8.6 g/dL  Hematocrit : 28.4 %  Platelet Count - Automated : 208 K/uL  Mean Cell Volume : 79.6 fl  Mean Cell Hemoglobin : 24.1 pg  Mean Cell Hemoglobin Concentration : 30.3 gm/dL  Auto Neutrophil # : x  Auto Lymphocyte # : x  Auto Monocyte # : x  Auto Eosinophil # : x  Auto Basophil # : x  Auto Neutrophil % : x  Auto Lymphocyte % : x  Auto Monocyte % : x  Auto Eosinophil % : x  Auto Basophil % : x    03-    134<L>  |  105  |  71<H>  ----------------------------<  96  2.7<LL>   |  18<L>  |  3.07<H>    Ca    7.6<L>      21 Mar 2019 08:11  Phos  1.0     -  Mg     2.8     -    TPro  5.8<L>  /  Alb  2.7<L>  /  TBili  0.3  /  DBili  x   /  AST  30  /  ALT  15  /  AlkPhos  93  03-21    PT/INR - ( 19 Mar 2019 18:14 )   PT: 10.4 sec;   INR: 0.93 ratio         PTT - ( 19 Mar 2019 18:14 )  PTT:22.2 sec  Urinalysis Basic - ( 19 Mar 2019 22:18 )    Color: Yellow / Appearance: Clear / S.010 / pH: x  Gluc: x / Ketone: Small  / Bili: Negative / Urobili: Negative mg/dL   Blood: x / Protein: 100 mg/dL / Nitrite: Negative   Leuk Esterase: Negative / RBC: 0-2 /HPF / WBC x   Sq Epi: x / Non Sq Epi: Occasional / Bacteria: Moderate            Impression:  * YUNIEL -- peak Cr 4.8. Resolving on IVFs. Unremarkable CT of kidneys  * Acute pancreatitis.   * ? CKD  * Homeless.     Recommendations:   * Cont saline  * Follow serum Cr to radha  * Requested serologies per Dr Dukes.   * KCL ordered.

## 2019-03-21 NOTE — BEHAVIORAL HEALTH ASSESSMENT NOTE - NSBHCHARTREVIEWVS_PSY_A_CORE FT
Vital Signs Last 24 Hrs  T(C): 36.9 (21 Mar 2019 12:47), Max: 36.9 (21 Mar 2019 12:47)  T(F): 98.4 (21 Mar 2019 12:47), Max: 98.4 (21 Mar 2019 12:47)  HR: 78 (21 Mar 2019 12:47) (73 - 78)  BP: 134/71 (21 Mar 2019 12:47) (134/71 - 169/78)  BP(mean): --  RR: 18 (21 Mar 2019 12:47) (18 - 18)  SpO2: 100% (21 Mar 2019 12:47) (99% - 100%)

## 2019-03-21 NOTE — BEHAVIORAL HEALTH ASSESSMENT NOTE - NSBHCHARTREVIEWLAB_PSY_A_CORE FT
03-21    134<L>  |  105  |  71<H>  ----------------------------<  96  2.7<LL>   |  18<L>  |  3.07<H>    Ca    7.6<L>      21 Mar 2019 08:11  Phos  1.0     03-21  Mg     2.8     03-21    TPro  5.8<L>  /  Alb  2.7<L>  /  TBili  0.3  /  DBili  x   /  AST  30  /  ALT  15  /  AlkPhos  93  03-21

## 2019-03-21 NOTE — BEHAVIORAL HEALTH ASSESSMENT NOTE - HPI (INCLUDE ILLNESS QUALITY, SEVERITY, DURATION, TIMING, CONTEXT, MODIFYING FACTORS, ASSOCIATED SIGNS AND SYMPTOMS)
58 year old man with no PMH (per him), former ETOH abuser presents to ED with complaint of abdominal pain with Back pain.  He is AAO x 3 but does not seem to be a reliable historian regarding PMH.  He does not know any medications (says "they all don't work, whatever they are, I don't take them").  In addition to his abdominal pain and Back pain he complains of nausea and vomiting.  He offers no other complaints.  patient appears foul smelling and disheveled , he is also pretty confused, states that he wants to go outside and smoke, stating that he smokes 2 cigarettes a day, states that he was not gone into withdrawal before. He was admitted with back pain which has gotten progressively worse over the last week. His Lipase has increased dramatically from 1000 to more than 6000, and he has poorly controlled electrolytes especially K and Phosphorus, at present although it has been explained to him he cannot explain the risks or benefits of leaving in the hospital, he is a very poor historian , and although it has been explained to him multiple times he cannot appreciate his current medical situation.

## 2019-03-21 NOTE — BEHAVIORAL HEALTH ASSESSMENT NOTE - FAMILY HISTORY OF MEDICAL ILLNESS
Follow up:  1. Schedule Flexible Sigmoidoscopy with Dilation   2. Schedule XR Colon   3. Schedule PAC appointment  4. Schedule Surgery    Please call with any questions or concerns regarding your clinic visit today.    It is a pleasure being involved in your health care.    Contacts post-consultation depending on your need:    Radiology Appointments               [phone]    Schedule Clinic Appointments      [phone] # 1   M-F 7:30 - 5 pm    [facility] [phone]    Clinic Fax Number         [phone]    Surgery Scheduling          [phone]    My Chart is available 24 hours a day and is a secure way to access your records and communicate with your care team.  I strongly recommend signing up if you haven't already done so, if you are comfortable with computers.  If you would like to inquire about this or are having problems with My Chart access, you may call [phone] or go online at [email or web address].  Please allow at least 24 hours for a response and extra time on weekends and Holidays.     None known

## 2019-03-21 NOTE — BEHAVIORAL HEALTH ASSESSMENT NOTE - SUMMARY
58 year old male with delirium who is confused and cannot leave AMA or make medical decisions at the present time

## 2019-03-21 NOTE — PROGRESS NOTE ADULT - ASSESSMENT
58 year old man with no PMH (per him), former ETOH abuser presents to ED with complaint of abdominal pain, Back pain & n/v and was found to have   Acute on chronic pancreatitis, YUNIEL & hypokalemia.    Acute on chronic pancreatitis  - CT showed numerous calcifications in the pancreatic head and pancreatic body w/ dilatation of the pancreatic duct within the pancreatic body and tail c/w  chronic pancreatitis.   - increase IVF  to 150ml/hr   - change to NPO  - Analgesia PRN  - given high BNP, will get ECHO before increasing IVF rate  - triglyceride WNL   - abd US showed a fatty liver w/ a dilatated main pancreatic duct and coarse calcifications within the pancreatic head compatible w/ chronic   pancreatitis    Hypokalemia & Hypophosphatemia  - give IV phos   - c/w IV & PO KCl   - add KCl to IVF    Anemia  - Hgb was 6 on admission  - Hgb 8.7 post 2 units pRBC  - Retic Count normal, B12 and Folate are still pending  - iron panel showed inflammatory anemia     ETOH use history  - c/w thiamine and add folic acid and MVN  - watch for withdrawal     YUNIEL   - suspect underlying CKD, but no baseline is available   - c/w IVF  - nephrology consulted    HTN  - increase Norvasc     HCV  - patient says he's aware that he has Hep C and was being treated as outpatient     Prophylaxis:   DVT: SCD  GI: PO diet 58 year old man with no PMH (per him), former ETOH abuser presents to ED with complaint of abdominal pain, Back pain & n/v and was found to have   Acute on chronic pancreatitis, YUNIEL & hypokalemia.    Acute on chronic pancreatitis  - CT showed numerous calcifications in the pancreatic head and pancreatic body w/ dilatation of the pancreatic duct within the pancreatic body and tail c/w  chronic pancreatitis.   - increase IVF  to 150ml/hr   - change to NPO  - Analgesia PRN  - given high BNP, will get ECHO before increasing IVF rate  - triglyceride WNL   - abd US showed a fatty liver w/ a dilatated main pancreatic duct and coarse calcifications within the pancreatic head compatible w/ chronic   pancreatitis  - as per GI, will consider MRCP if lipase continues to go up    Hypokalemia & Hypophosphatemia  - give IV phos   - c/w IV & PO KCl   - add KCl to IVF    Anemia  - Hgb was 6 on admission  - Hgb 8.7 post 2 units pRBC  - Retic Count normal, B12 and Folate are still pending  - iron panel showed inflammatory anemia     ETOH use history  - c/w thiamine and add folic acid and MVN  - watch for withdrawal     YUNIEL   - suspect underlying CKD, but no baseline is available   - c/w IVF  - nephrology consulted    HTN  - increase Norvasc     HCV  - patient says he's aware that he has Hep C and was being treated as outpatient     Prophylaxis:   DVT: SCD  GI: PO diet     Pt cannot leave AMA.

## 2019-03-21 NOTE — PROGRESS NOTE ADULT - PROBLEM SELECTOR PLAN 1
Hydration per renal  Follow Serum amylase and lipase  Lipid panel  Abdominal sonogram  once tolerating regular diet will need Creon 99881 units TID with meals.

## 2019-03-22 DIAGNOSIS — R76.8 OTHER SPECIFIED ABNORMAL IMMUNOLOGICAL FINDINGS IN SERUM: ICD-10-CM

## 2019-03-22 LAB
ALBUMIN SERPL ELPH-MCNC: 2.8 G/DL — LOW (ref 3.3–5)
ALP SERPL-CCNC: 99 U/L — SIGNIFICANT CHANGE UP (ref 40–120)
ALT FLD-CCNC: 17 U/L — SIGNIFICANT CHANGE UP (ref 12–78)
ANION GAP SERPL CALC-SCNC: 9 MMOL/L — SIGNIFICANT CHANGE UP (ref 5–17)
AST SERPL-CCNC: 27 U/L — SIGNIFICANT CHANGE UP (ref 15–37)
BILIRUB SERPL-MCNC: 0.4 MG/DL — SIGNIFICANT CHANGE UP (ref 0.2–1.2)
BUN SERPL-MCNC: 59 MG/DL — HIGH (ref 7–23)
CALCIUM SERPL-MCNC: 8.1 MG/DL — LOW (ref 8.5–10.1)
CHLORIDE SERPL-SCNC: 111 MMOL/L — HIGH (ref 96–108)
CO2 SERPL-SCNC: 20 MMOL/L — LOW (ref 22–31)
CREAT SERPL-MCNC: 2.63 MG/DL — HIGH (ref 0.5–1.3)
FOLATE RBC-MCNC: 1088 NG/ML — SIGNIFICANT CHANGE UP (ref 499–1504)
GLUCOSE SERPL-MCNC: 100 MG/DL — HIGH (ref 70–99)
HBA1C BLD-MCNC: 5 % — SIGNIFICANT CHANGE UP (ref 4–5.6)
HCT VFR BLD CALC: 29.7 % — LOW (ref 39–50)
HCT VFR BLD CALC: 31 % — LOW (ref 39–50)
HGB BLD-MCNC: 9.1 G/DL — LOW (ref 13–17)
IGA FLD-MCNC: 201 MG/DL — SIGNIFICANT CHANGE UP (ref 84–499)
IGG FLD-MCNC: 757 MG/DL — SIGNIFICANT CHANGE UP (ref 610–1660)
IGM SERPL-MCNC: 68 MG/DL — SIGNIFICANT CHANGE UP (ref 35–242)
KAPPA LC SER QL IFE: 8.76 MG/DL — HIGH (ref 0.33–1.94)
KAPPA/LAMBDA FREE LIGHT CHAIN RATIO, SERUM: 1.23 RATIO — SIGNIFICANT CHANGE UP (ref 0.26–1.65)
LAMBDA LC SER QL IFE: 7.13 MG/DL — HIGH (ref 0.57–2.63)
LIDOCAIN IGE QN: 2946 U/L — HIGH (ref 73–393)
MAGNESIUM SERPL-MCNC: 3.1 MG/DL — HIGH (ref 1.6–2.6)
MCHC RBC-ENTMCNC: 23.9 PG — LOW (ref 27–34)
MCHC RBC-ENTMCNC: 29.4 GM/DL — LOW (ref 32–36)
MCV RBC AUTO: 81.6 FL — SIGNIFICANT CHANGE UP (ref 80–100)
NRBC # BLD: 0 /100 WBCS — SIGNIFICANT CHANGE UP (ref 0–0)
PHOSPHATE SERPL-MCNC: 1.4 MG/DL — LOW (ref 2.5–4.5)
PLATELET # BLD AUTO: 202 K/UL — SIGNIFICANT CHANGE UP (ref 150–400)
POTASSIUM SERPL-MCNC: 3.4 MMOL/L — LOW (ref 3.5–5.3)
POTASSIUM SERPL-SCNC: 3.4 MMOL/L — LOW (ref 3.5–5.3)
PROCALCITONIN SERPL-MCNC: 0.46 NG/ML — HIGH (ref 0.02–0.1)
PROT SERPL-MCNC: 6.5 GM/DL — SIGNIFICANT CHANGE UP (ref 6–8.3)
RBC # BLD: 3.8 M/UL — LOW (ref 4.2–5.8)
RBC # FLD: 18.9 % — HIGH (ref 10.3–14.5)
SODIUM SERPL-SCNC: 140 MMOL/L — SIGNIFICANT CHANGE UP (ref 135–145)
WBC # BLD: 16.17 K/UL — HIGH (ref 3.8–10.5)
WBC # FLD AUTO: 16.17 K/UL — HIGH (ref 3.8–10.5)

## 2019-03-22 PROCEDURE — 99233 SBSQ HOSP IP/OBS HIGH 50: CPT

## 2019-03-22 PROCEDURE — 93010 ELECTROCARDIOGRAM REPORT: CPT

## 2019-03-22 RX ORDER — SODIUM,POTASSIUM PHOSPHATES 278-250MG
1 POWDER IN PACKET (EA) ORAL
Qty: 0 | Refills: 0 | Status: COMPLETED | OUTPATIENT
Start: 2019-03-22 | End: 2019-03-25

## 2019-03-22 RX ORDER — BUDESONIDE AND FORMOTEROL FUMARATE DIHYDRATE 160; 4.5 UG/1; UG/1
2 AEROSOL RESPIRATORY (INHALATION)
Qty: 0 | Refills: 0 | Status: DISCONTINUED | OUTPATIENT
Start: 2019-03-22 | End: 2019-03-29

## 2019-03-22 RX ORDER — IPRATROPIUM/ALBUTEROL SULFATE 18-103MCG
3 AEROSOL WITH ADAPTER (GRAM) INHALATION EVERY 8 HOURS
Qty: 0 | Refills: 0 | Status: DISCONTINUED | OUTPATIENT
Start: 2019-03-22 | End: 2019-03-29

## 2019-03-22 RX ORDER — POTASSIUM CHLORIDE 20 MEQ
10 PACKET (EA) ORAL
Qty: 0 | Refills: 0 | Status: COMPLETED | OUTPATIENT
Start: 2019-03-22 | End: 2019-03-22

## 2019-03-22 RX ADMIN — Medication 1 TABLET(S): at 11:03

## 2019-03-22 RX ADMIN — MORPHINE SULFATE 2 MILLIGRAM(S): 50 CAPSULE, EXTENDED RELEASE ORAL at 15:46

## 2019-03-22 RX ADMIN — MORPHINE SULFATE 15 MILLIGRAM(S): 50 CAPSULE, EXTENDED RELEASE ORAL at 06:09

## 2019-03-22 RX ADMIN — SODIUM CHLORIDE 150 MILLILITER(S): 9 INJECTION, SOLUTION INTRAVENOUS at 21:23

## 2019-03-22 RX ADMIN — MORPHINE SULFATE 2 MILLIGRAM(S): 50 CAPSULE, EXTENDED RELEASE ORAL at 21:23

## 2019-03-22 RX ADMIN — MORPHINE SULFATE 15 MILLIGRAM(S): 50 CAPSULE, EXTENDED RELEASE ORAL at 05:20

## 2019-03-22 RX ADMIN — MORPHINE SULFATE 2 MILLIGRAM(S): 50 CAPSULE, EXTENDED RELEASE ORAL at 16:54

## 2019-03-22 RX ADMIN — SODIUM CHLORIDE 150 MILLILITER(S): 9 INJECTION, SOLUTION INTRAVENOUS at 05:21

## 2019-03-22 RX ADMIN — MORPHINE SULFATE 15 MILLIGRAM(S): 50 CAPSULE, EXTENDED RELEASE ORAL at 18:46

## 2019-03-22 RX ADMIN — Medication 1 PACKET(S): at 17:33

## 2019-03-22 RX ADMIN — Medication 1 PATCH: at 12:15

## 2019-03-22 RX ADMIN — Medication 1 PATCH: at 21:04

## 2019-03-22 RX ADMIN — Medication 100 MILLIEQUIVALENT(S): at 12:09

## 2019-03-22 RX ADMIN — Medication 100 MILLIEQUIVALENT(S): at 10:05

## 2019-03-22 RX ADMIN — AMLODIPINE BESYLATE 10 MILLIGRAM(S): 2.5 TABLET ORAL at 05:21

## 2019-03-22 RX ADMIN — Medication 100 MILLIGRAM(S): at 12:09

## 2019-03-22 RX ADMIN — Medication 3 MILLILITER(S): at 21:30

## 2019-03-22 RX ADMIN — Medication 1 PACKET(S): at 11:03

## 2019-03-22 RX ADMIN — Medication 1 MILLIGRAM(S): at 11:03

## 2019-03-22 RX ADMIN — MORPHINE SULFATE 15 MILLIGRAM(S): 50 CAPSULE, EXTENDED RELEASE ORAL at 17:33

## 2019-03-22 RX ADMIN — SODIUM CHLORIDE 150 MILLILITER(S): 9 INJECTION, SOLUTION INTRAVENOUS at 10:06

## 2019-03-22 RX ADMIN — Medication 100 MILLIEQUIVALENT(S): at 11:02

## 2019-03-22 RX ADMIN — Medication 1 PATCH: at 08:24

## 2019-03-22 RX ADMIN — OXYCODONE AND ACETAMINOPHEN 1 TABLET(S): 5; 325 TABLET ORAL at 10:06

## 2019-03-22 RX ADMIN — MORPHINE SULFATE 2 MILLIGRAM(S): 50 CAPSULE, EXTENDED RELEASE ORAL at 09:58

## 2019-03-22 RX ADMIN — Medication 1 PATCH: at 12:10

## 2019-03-22 RX ADMIN — MORPHINE SULFATE 2 MILLIGRAM(S): 50 CAPSULE, EXTENDED RELEASE ORAL at 08:25

## 2019-03-22 RX ADMIN — Medication 3 MILLILITER(S): at 11:50

## 2019-03-22 RX ADMIN — MORPHINE SULFATE 2 MILLIGRAM(S): 50 CAPSULE, EXTENDED RELEASE ORAL at 22:23

## 2019-03-22 RX ADMIN — OXYCODONE AND ACETAMINOPHEN 1 TABLET(S): 5; 325 TABLET ORAL at 11:31

## 2019-03-22 NOTE — PROGRESS NOTE ADULT - PROBLEM SELECTOR PLAN 1
Hydration per renal  Follow Serum amylase and lipase  Lipid panel  Abdominal sonogram  once tolerating regular diet will need Creon [id number] units TID with meals.

## 2019-03-22 NOTE — PROGRESS NOTE ADULT - ASSESSMENT
57y/o male with Acute on Chronic Pancreatitis, Microcytic anemia, Etoh abuse - patient states he has been drinking for the last 35+ years and quit last weak because of abdominal pain

## 2019-03-22 NOTE — PROGRESS NOTE ADULT - SUBJECTIVE AND OBJECTIVE BOX
58 year old man with no PMH (per him), former ETOH abuser presents to ED with complaint of abdominal pain, Back pain & n/v and was found to have   Acute on chronic pancreatitis, YUNIEL & hypokalemia. He is lying in bed in NAD.       MEDICATIONS  (STANDING):  ALBUTerol/ipratropium for Nebulization 3 milliLiter(s) Nebulizer every 8 hours  amLODIPine   Tablet 10 milliGRAM(s) Oral daily  buDESOnide 160 MICROgram(s)/formoterol 4.5 MICROgram(s) Inhaler 2 Puff(s) Inhalation two times a day  folic acid 1 milliGRAM(s) Oral daily  morphine ER Tablet 15 milliGRAM(s) Oral every 12 hours  multivitamin/minerals 1 Tablet(s) Oral daily  nicotine -  14 mG/24Hr(s) Patch 1 patch Transdermal daily  potassium phosphate / sodium phosphate powder 1 Packet(s) Oral four times a day  sodium chloride 0.9% 1000 milliLiter(s) (150 mL/Hr) IV Continuous <Continuous>  thiamine 100 milliGRAM(s) Oral daily    MEDICATIONS  (PRN):  LORazepam     Tablet 2 milliGRAM(s) Oral every 6 hours PRN Anxiety  morphine  - Injectable 2 milliGRAM(s) IV Push every 6 hours PRN Severe Pain (7 - 10)  ondansetron Injectable 8 milliGRAM(s) IV Push every 8 hours PRN Nausea and/or Vomiting  oxyCODONE    5 mG/acetaminophen 325 mG 1 Tablet(s) Oral every 6 hours PRN Moderate Pain (4 - 6)      Allergies    No Known Allergies    Intolerances        Vital Signs Last 24 Hrs  T(C): 36.6 (22 Mar 2019 17:51), Max: 36.9 (21 Mar 2019 23:17)  T(F): 97.8 (22 Mar 2019 17:51), Max: 98.5 (21 Mar 2019 23:17)  HR: 80 (22 Mar 2019 17:51) (78 - 82)  BP: 158/85 (22 Mar 2019 17:51) (140/77 - 158/85)  BP(mean): --  RR: 17 (22 Mar 2019 17:51) (17 - 19)  SpO2: 100% (22 Mar 2019 17:51) (98% - 100%)    PHYSICAL EXAM:  GENERAL: NAD, well-groomed, well-developed  HEAD:  Atraumatic, Normocephalic  EYES: EOMI, PERRLA   NECK: Supple   NERVOUS SYSTEM: Alert, no tremors  CHEST/LUNG: Clear to auscultation bilaterally; No rales, rhonchi, wheezing, or rubs  HEART: Regular rate and rhythm; No murmurs, rubs, or gallops  ABDOMEN: Soft, Nontender, Nondistended; Bowel sounds present  EXTREMITIES: No clubbing, cyanosis, or edema    LABS:                        9.1    16.17 )-----------( 202      ( 22 Mar 2019 07:50 )             31.0     03-22    140  |  111<H>  |  59<H>  ----------------------------<  100<H>  3.4<L>   |  20<L>  |  2.63<H>    Ca    8.1<L>      22 Mar 2019 07:50  Phos  1.4     03-22  Mg     3.1     03-22    TPro  6.5  /  Alb  2.8<L>  /  TBili  0.4  /  DBili  x   /  AST  27  /  ALT  17  /  AlkPhos  99  03-22        CAPILLARY BLOOD GLUCOSE          Culture - Urine (collected 20 Mar 2019 10:40)  Source: .Urine  Final Report (21 Mar 2019 19:09):    <10,000 CFU/mL Normal Urogenital Mariah      RADIOLOGY & ADDITIONAL TESTS:    03-21-19 @ 07:01  -  03-22-19 @ 07:00  --------------------------------------------------------  IN:    Oral Fluid: 100 mL    sodium chloride 0.9%: 1800 mL  Total IN: 1900 mL    OUT:    Voided: 700 mL  Total OUT: 700 mL    Total NET: 1200 mL

## 2019-03-22 NOTE — PROGRESS NOTE ADULT - SUBJECTIVE AND OBJECTIVE BOX
Patient mild tachypnea earlier;         MEDICATIONS  (STANDING):  ALBUTerol/ipratropium for Nebulization 3 milliLiter(s) Nebulizer every 8 hours  amLODIPine   Tablet 10 milliGRAM(s) Oral daily  buDESOnide 160 MICROgram(s)/formoterol 4.5 MICROgram(s) Inhaler 2 Puff(s) Inhalation two times a day  folic acid 1 milliGRAM(s) Oral daily  morphine ER Tablet 15 milliGRAM(s) Oral every 12 hours  multivitamin/minerals 1 Tablet(s) Oral daily  nicotine -  14 mG/24Hr(s) Patch 1 patch Transdermal daily  potassium phosphate / sodium phosphate powder 1 Packet(s) Oral four times a day  sodium chloride 0.9% 1000 milliLiter(s) (150 mL/Hr) IV Continuous <Continuous>  thiamine 100 milliGRAM(s) Oral daily    MEDICATIONS  (PRN):  LORazepam     Tablet 2 milliGRAM(s) Oral every 6 hours PRN Anxiety  morphine  - Injectable 2 milliGRAM(s) IV Push every 6 hours PRN Severe Pain (7 - 10)  ondansetron Injectable 8 milliGRAM(s) IV Push every 8 hours PRN Nausea and/or Vomiting  oxyCODONE    5 mG/acetaminophen 325 mG 1 Tablet(s) Oral every 6 hours PRN Moderate Pain (4 - 6)      03-21-19 @ 07:01  -  03-22-19 @ 07:00  --------------------------------------------------------  IN: 1900 mL / OUT: 700 mL / NET: 1200 mL      PHYSICAL EXAM:      T(C): 36.2 (03-22-19 @ 12:31), Max: 36.9 (03-21-19 @ 23:17)  HR: 78 (03-22-19 @ 12:31) (78 - 84)  BP: 143/71 (03-22-19 @ 12:31) (140/77 - 176/71)  RR: 18 (03-22-19 @ 12:31) (18 - 19)  SpO2: 100% (03-22-19 @ 12:31) (98% - 100%)  Wt(kg): --  Respiratory: clear anteriorly, decreased BS at bases no rales;   Cardiovascular: S1 S2  Gastrointestinal: soft NT ND +BS  Extremities: 1  edema                                    9.1    16.17 )-----------( 202      ( 22 Mar 2019 07:50 )             31.0     03-22    140  |  111<H>  |  59<H>  ----------------------------<  100<H>  3.4<L>   |  20<L>  |  2.63<H>    Ca    8.1<L>      22 Mar 2019 07:50  Phos  1.4     03-22  Mg     3.1     03-22    TPro  6.5  /  Alb  2.8<L>  /  TBili  0.4  /  DBili  x   /  AST  27  /  ALT  17  /  AlkPhos  99  03-22      LIVER FUNCTIONS - ( 22 Mar 2019 07:50 )  Alb: 2.8 g/dL / Pro: 6.5 gm/dL / ALK PHOS: 99 U/L / ALT: 17 U/L / AST: 27 U/L / GGT: x             Assessment and Plan:  YUNIEL, hypokalemia; acute pancreatitis;  degree of CKD unclear; pre renal azotemia;  NSAIDs AIN; hemodynamic effect; r/o paraprotein disease;  Replete K, phos;   COPD nebs; inhaler;   IVF;   Will follow.

## 2019-03-22 NOTE — PROGRESS NOTE ADULT - SUBJECTIVE AND OBJECTIVE BOX
Gastroenterology  Patient seen and examined bedside resting comfortably.  +abdominal pain  Denies nausea and vomiting. NPO "I am Hungry"  Normal flatus/BM.     T(F): 97.8 (03-22-19 @ 04:56), Max: 98.5 (03-21-19 @ 23:17)  HR: 78 (03-22-19 @ 04:56) (78 - 84)  BP: 140/77 (03-22-19 @ 04:56) (134/71 - 176/71)  RR: 18 (03-22-19 @ 04:56) (18 - 19)  SpO2: 98% (03-22-19 @ 04:56) (98% - 100%)  Wt(kg): --  CAPILLARY BLOOD GLUCOSE          PHYSICAL EXAM:  General: NAD, WDWN.   Neuro:  Alert & responsive  HEENT: NCAT, EOMI, conjunctiva clear  CV: +S1+S2 regular rate and rhythm  Lung: clear to ausculation bilaterally, respirations nonlabored, good inspiratory effort  Abdomen: soft, +Tender, No distention Normal active BS  Extremities: no cyanosis, clubbing or edema    LABS:                        9.1    16.17 )-----------( 202      ( 22 Mar 2019 07:50 )             31.0     03-21    134<L>  |  105  |  71<H>  ----------------------------<  96  2.7<LL>   |  18<L>  |  3.07<H>    Ca    7.6<L>      21 Mar 2019 08:11  Phos  1.0     03-21  Mg     2.8     03-21    TPro  5.8<L>  /  Alb  2.7<L>  /  TBili  0.3  /  DBili  x   /  AST  30  /  ALT  15  /  AlkPhos  93  03-21    LIVER FUNCTIONS - ( 21 Mar 2019 08:11 )  Alb: 2.7 g/dL / Pro: 5.8 gm/dL / ALK PHOS: 93 U/L / ALT: 15 U/L / AST: 30 U/L / GGT: x             I&O's Detail    21 Mar 2019 07:01  -  22 Mar 2019 07:00  --------------------------------------------------------  IN:    Oral Fluid: 100 mL    sodium chloride 0.9%: 1800 mL  Total IN: 1900 mL    OUT:    Voided: 700 mL  Total OUT: 700 mL    Total NET: 1200 mL        03-21 @ 08:11    134 | 105 | 71  /7.6 | 2.8 | 1.0  _______________________/  2.7 | 18 | 3.07                           \par   Amylase, Serum Total: 1007 U/L (03-21 @ 08:11)

## 2019-03-22 NOTE — PROGRESS NOTE ADULT - ASSESSMENT
58 year old man with no PMH (per him), former ETOH abuser presents to ED with complaint of abdominal pain, Back pain & n/v and was found to have   Acute on chronic pancreatitis, YUNIEL & hypokalemia.    Acute on chronic pancreatitis  - CT showed numerous calcifications in the pancreatic head and pancreatic body w/ dilatation of the pancreatic duct within the pancreatic body and tail c/w  chronic pancreatitis  - lipase improving  - c/w IVF   - NPO  - Analgesia PRN  - triglyceride WNL   - abd US showed a fatty liver w/ a dilatated main pancreatic duct and coarse calcifications within the pancreatic head compatible w/ chronic   pancreatitis  - as per GI, will consider MRCP if lipase continues to go up    Leukocytosis  - likely reactive  - procal 0.46    Hypokalemia & Hypophosphatemia  - give PO Kphos   - c/w IV & PO KCl     Anemia  - Hgb was 6 on admission  - Hgb 8.7 post 2 units pRBC  - Retic Count normal, B12 and Folate are still pending  - iron panel showed inflammatory anemia     ETOH use history  - c/w thiamine and add folic acid and MVN  - watch for withdrawal     YUNIEL   - suspect underlying CKD, but no baseline is available   - c/w IVF  - nephrology consulted    HTN  - increase Norvasc     HCV  - patient says he's aware that he has Hep C and was being treated as outpatient     Prophylaxis:   DVT: SCD  GI: PO diet     Pt cannot leave AMA.

## 2019-03-23 LAB
ANION GAP SERPL CALC-SCNC: 8 MMOL/L — SIGNIFICANT CHANGE UP (ref 5–17)
APPEARANCE UR: CLEAR — SIGNIFICANT CHANGE UP
BACTERIA # UR AUTO: ABNORMAL
BASOPHILS # BLD AUTO: 0.09 K/UL — SIGNIFICANT CHANGE UP (ref 0–0.2)
BASOPHILS NFR BLD AUTO: 0.6 % — SIGNIFICANT CHANGE UP (ref 0–2)
BILIRUB UR-MCNC: NEGATIVE — SIGNIFICANT CHANGE UP
BUN SERPL-MCNC: 44 MG/DL — HIGH (ref 7–23)
CALCIUM SERPL-MCNC: 8.1 MG/DL — LOW (ref 8.5–10.1)
CHLORIDE SERPL-SCNC: 112 MMOL/L — HIGH (ref 96–108)
CO2 SERPL-SCNC: 21 MMOL/L — LOW (ref 22–31)
COLOR SPEC: YELLOW — SIGNIFICANT CHANGE UP
CREAT SERPL-MCNC: 2.14 MG/DL — HIGH (ref 0.5–1.3)
CRP SERPL-MCNC: 8.11 MG/DL — HIGH (ref 0–0.4)
DIFF PNL FLD: ABNORMAL
EOSINOPHIL # BLD AUTO: 0 K/UL — SIGNIFICANT CHANGE UP (ref 0–0.5)
EOSINOPHIL NFR BLD AUTO: 0 % — SIGNIFICANT CHANGE UP (ref 0–6)
EPI CELLS # UR: SIGNIFICANT CHANGE UP
GLUCOSE SERPL-MCNC: 100 MG/DL — HIGH (ref 70–99)
GLUCOSE UR QL: NEGATIVE MG/DL — SIGNIFICANT CHANGE UP
HCT VFR BLD CALC: 27.6 % — LOW (ref 39–50)
HGB BLD-MCNC: 8.1 G/DL — LOW (ref 13–17)
IMM GRANULOCYTES NFR BLD AUTO: 1.2 % — SIGNIFICANT CHANGE UP (ref 0–1.5)
KETONES UR-MCNC: NEGATIVE — SIGNIFICANT CHANGE UP
LEUKOCYTE ESTERASE UR-ACNC: NEGATIVE — SIGNIFICANT CHANGE UP
LIDOCAIN IGE QN: 1223 U/L — HIGH (ref 73–393)
LYMPHOCYTES # BLD AUTO: 0.84 K/UL — LOW (ref 1–3.3)
LYMPHOCYTES # BLD AUTO: 5.1 % — LOW (ref 13–44)
MAGNESIUM SERPL-MCNC: 2.4 MG/DL — SIGNIFICANT CHANGE UP (ref 1.6–2.6)
MCHC RBC-ENTMCNC: 24.3 PG — LOW (ref 27–34)
MCHC RBC-ENTMCNC: 29.3 GM/DL — LOW (ref 32–36)
MCV RBC AUTO: 82.9 FL — SIGNIFICANT CHANGE UP (ref 80–100)
MONOCYTES # BLD AUTO: 1 K/UL — HIGH (ref 0–0.9)
MONOCYTES NFR BLD AUTO: 6.1 % — SIGNIFICANT CHANGE UP (ref 2–14)
NEUTROPHILS # BLD AUTO: 14.2 K/UL — HIGH (ref 1.8–7.4)
NEUTROPHILS NFR BLD AUTO: 87 % — HIGH (ref 43–77)
NITRITE UR-MCNC: NEGATIVE — SIGNIFICANT CHANGE UP
NRBC # BLD: 0 /100 WBCS — SIGNIFICANT CHANGE UP (ref 0–0)
PH UR: 6 — SIGNIFICANT CHANGE UP (ref 5–8)
PHOSPHATE SERPL-MCNC: 3.7 MG/DL — SIGNIFICANT CHANGE UP (ref 2.5–4.5)
PLATELET # BLD AUTO: 183 K/UL — SIGNIFICANT CHANGE UP (ref 150–400)
POTASSIUM SERPL-MCNC: 4.4 MMOL/L — SIGNIFICANT CHANGE UP (ref 3.5–5.3)
POTASSIUM SERPL-SCNC: 4.4 MMOL/L — SIGNIFICANT CHANGE UP (ref 3.5–5.3)
PROT UR-MCNC: 30 MG/DL
RBC # BLD: 3.33 M/UL — LOW (ref 4.2–5.8)
RBC # FLD: 19.3 % — HIGH (ref 10.3–14.5)
RBC CASTS # UR COMP ASSIST: SIGNIFICANT CHANGE UP /HPF (ref 0–4)
SODIUM SERPL-SCNC: 141 MMOL/L — SIGNIFICANT CHANGE UP (ref 135–145)
SP GR SPEC: 1.01 — SIGNIFICANT CHANGE UP (ref 1.01–1.02)
UROBILINOGEN FLD QL: NEGATIVE MG/DL — SIGNIFICANT CHANGE UP
WBC # BLD: 16.33 K/UL — HIGH (ref 3.8–10.5)
WBC # FLD AUTO: 16.33 K/UL — HIGH (ref 3.8–10.5)

## 2019-03-23 PROCEDURE — 99233 SBSQ HOSP IP/OBS HIGH 50: CPT

## 2019-03-23 RX ORDER — NICOTINE POLACRILEX 2 MG
1 GUM BUCCAL DAILY
Qty: 0 | Refills: 0 | Status: DISCONTINUED | OUTPATIENT
Start: 2019-03-23 | End: 2019-03-29

## 2019-03-23 RX ORDER — SODIUM CHLORIDE 9 MG/ML
1000 INJECTION, SOLUTION INTRAVENOUS
Qty: 0 | Refills: 0 | Status: DISCONTINUED | OUTPATIENT
Start: 2019-03-23 | End: 2019-03-25

## 2019-03-23 RX ADMIN — Medication 3 MILLILITER(S): at 14:42

## 2019-03-23 RX ADMIN — MORPHINE SULFATE 2 MILLIGRAM(S): 50 CAPSULE, EXTENDED RELEASE ORAL at 11:45

## 2019-03-23 RX ADMIN — MORPHINE SULFATE 2 MILLIGRAM(S): 50 CAPSULE, EXTENDED RELEASE ORAL at 03:30

## 2019-03-23 RX ADMIN — Medication 1 PATCH: at 19:50

## 2019-03-23 RX ADMIN — Medication 3 MILLILITER(S): at 22:05

## 2019-03-23 RX ADMIN — MORPHINE SULFATE 15 MILLIGRAM(S): 50 CAPSULE, EXTENDED RELEASE ORAL at 22:43

## 2019-03-23 RX ADMIN — Medication 1 PACKET(S): at 05:01

## 2019-03-23 RX ADMIN — Medication 1 PACKET(S): at 11:25

## 2019-03-23 RX ADMIN — MORPHINE SULFATE 2 MILLIGRAM(S): 50 CAPSULE, EXTENDED RELEASE ORAL at 02:55

## 2019-03-23 RX ADMIN — ONDANSETRON 8 MILLIGRAM(S): 8 TABLET, FILM COATED ORAL at 11:26

## 2019-03-23 RX ADMIN — MORPHINE SULFATE 2 MILLIGRAM(S): 50 CAPSULE, EXTENDED RELEASE ORAL at 16:56

## 2019-03-23 RX ADMIN — Medication 1 MILLIGRAM(S): at 11:25

## 2019-03-23 RX ADMIN — AMLODIPINE BESYLATE 10 MILLIGRAM(S): 2.5 TABLET ORAL at 05:00

## 2019-03-23 RX ADMIN — Medication 1 PATCH: at 07:15

## 2019-03-23 RX ADMIN — MORPHINE SULFATE 2 MILLIGRAM(S): 50 CAPSULE, EXTENDED RELEASE ORAL at 11:26

## 2019-03-23 RX ADMIN — Medication 1 TABLET(S): at 11:25

## 2019-03-23 RX ADMIN — Medication 100 MILLIGRAM(S): at 11:25

## 2019-03-23 RX ADMIN — Medication 3 MILLILITER(S): at 05:10

## 2019-03-23 RX ADMIN — MORPHINE SULFATE 2 MILLIGRAM(S): 50 CAPSULE, EXTENDED RELEASE ORAL at 17:15

## 2019-03-23 RX ADMIN — MORPHINE SULFATE 15 MILLIGRAM(S): 50 CAPSULE, EXTENDED RELEASE ORAL at 05:01

## 2019-03-23 RX ADMIN — MORPHINE SULFATE 2 MILLIGRAM(S): 50 CAPSULE, EXTENDED RELEASE ORAL at 22:56

## 2019-03-23 RX ADMIN — MORPHINE SULFATE 15 MILLIGRAM(S): 50 CAPSULE, EXTENDED RELEASE ORAL at 06:01

## 2019-03-23 RX ADMIN — Medication 1 PATCH: at 11:25

## 2019-03-23 RX ADMIN — SODIUM CHLORIDE 150 MILLILITER(S): 9 INJECTION, SOLUTION INTRAVENOUS at 04:12

## 2019-03-23 RX ADMIN — MORPHINE SULFATE 2 MILLIGRAM(S): 50 CAPSULE, EXTENDED RELEASE ORAL at 23:26

## 2019-03-23 RX ADMIN — Medication 1 PATCH: at 11:27

## 2019-03-23 RX ADMIN — Medication 1 PACKET(S): at 01:48

## 2019-03-23 RX ADMIN — Medication 1 PATCH: at 22:38

## 2019-03-23 RX ADMIN — MORPHINE SULFATE 15 MILLIGRAM(S): 50 CAPSULE, EXTENDED RELEASE ORAL at 21:03

## 2019-03-23 NOTE — PROGRESS NOTE ADULT - PROBLEM SELECTOR PLAN 1
Hydration per renal  Follow Serum amylase and lipase  Lipid panel  Abdominal sonogram  once tolerating regular diet will need Creon 30090 units TID with meals. Hydration per renal  Serum lipase elevated but significantly better  Triglycerides minimally elevated  ---- slowly advance diet and observe  Abdominal sonogram  once tolerating regular diet will need Creon 64309 units TID with meals.

## 2019-03-23 NOTE — PROGRESS NOTE ADULT - SUBJECTIVE AND OBJECTIVE BOX
58 year old man with no PMH (per him), former ETOH abuser presents to ED with complaint of abdominal pain, Back pain & n/v and was found to have   Acute on chronic pancreatitis, YUNIEL & hypokalemia. He is lying in bed in NAD.       MEDICATIONS  (STANDING):  ALBUTerol/ipratropium for Nebulization 3 milliLiter(s) Nebulizer every 8 hours  amLODIPine   Tablet 10 milliGRAM(s) Oral daily  buDESOnide 160 MICROgram(s)/formoterol 4.5 MICROgram(s) Inhaler 2 Puff(s) Inhalation two times a day  folic acid 1 milliGRAM(s) Oral daily  morphine ER Tablet 15 milliGRAM(s) Oral every 12 hours  multivitamin/minerals 1 Tablet(s) Oral daily  nicotine -  14 mG/24Hr(s) Patch 1 patch Transdermal daily  potassium phosphate / sodium phosphate powder 1 Packet(s) Oral four times a day  sodium chloride 0.9% 1000 milliLiter(s) (100 mL/Hr) IV Continuous <Continuous>  thiamine 100 milliGRAM(s) Oral daily    MEDICATIONS  (PRN):  LORazepam     Tablet 2 milliGRAM(s) Oral every 6 hours PRN Anxiety  morphine  - Injectable 2 milliGRAM(s) IV Push every 6 hours PRN Severe Pain (7 - 10)  ondansetron Injectable 8 milliGRAM(s) IV Push every 8 hours PRN Nausea and/or Vomiting  oxyCODONE    5 mG/acetaminophen 325 mG 1 Tablet(s) Oral every 6 hours PRN Moderate Pain (4 - 6)      Allergies    No Known Allergies    Intolerances     Vital Signs Last 24 Hrs  T(C): 36.1 (23 Mar 2019 16:44), Max: 36.6 (23 Mar 2019 04:48)  T(F): 97 (23 Mar 2019 16:44), Max: 97.8 (23 Mar 2019 04:48)  HR: 80 (23 Mar 2019 22:05) (80 - 92)  BP: 145/69 (23 Mar 2019 16:44) (135/68 - 163/82)  BP(mean): --  RR: 19 (23 Mar 2019 16:44) (18 - 20)  SpO2: 93% (23 Mar 2019 22:05) (91% - 94%)    PHYSICAL EXAM:  GENERAL: NAD, well-groomed, well-developed  HEAD:  Atraumatic, Normocephalic  EYES: EOMI, PERRLA   NECK: Supple   NERVOUS SYSTEM: Alert, no tremors  CHEST/LUNG: Clear to auscultation bilaterally; No rales, rhonchi, wheezing, or rubs  HEART: Regular rate and rhythm; No murmurs, rubs, or gallops  ABDOMEN: Soft, Nontender, Nondistended; Bowel sounds present  EXTREMITIES: No clubbing, cyanosis, or edema    LABS:                        8.1    16.33 )-----------( 183      ( 23 Mar 2019 07:48 )             27.6         141  |  112<H>  |  44<H>  ----------------------------<  100<H>  4.4   |  21<L>  |  2.14<H>    Ca    8.1<L>      23 Mar 2019 07:48  Phos  3.7       Mg     2.4         TPro  6.5  /  Alb  2.8<L>  /  TBili  0.4  /  DBili  x   /  AST  27  /  ALT  17  /  AlkPhos  99        Urinalysis Basic - ( 23 Mar 2019 14:21 )    Color: Yellow / Appearance: Clear / S.010 / pH: x  Gluc: x / Ketone: Negative  / Bili: Negative / Urobili: Negative mg/dL   Blood: x / Protein: 30 mg/dL / Nitrite: Negative   Leuk Esterase: Negative / RBC: 0-2 /HPF / WBC x   Sq Epi: x / Non Sq Epi: Occasional / Bacteria: Few      CAPILLARY BLOOD GLUCOSE       Culture - Urine (collected 20 Mar 2019 10:40)  Source: .Urine  Final Report (21 Mar 2019 19:09):    <10,000 CFU/mL Normal Urogenital Mariah      RADIOLOGY & ADDITIONAL TESTS:    19 @ 07:01  -  19 @ 07:00  --------------------------------------------------------  IN:    sodium chloride 0.9%: 1000 mL  Total IN: 1000 mL    OUT:  Total OUT: 0 mL    Total NET: 1000 mL      19 @ 07:19 @ 22:26  --------------------------------------------------------  IN:    Oral Fluid: 240 mL  Total IN: 240 mL    OUT:    Voided: 250 mL  Total OUT: 250 mL    Total NET: -10 mL

## 2019-03-23 NOTE — PROGRESS NOTE ADULT - ASSESSMENT
58 year old man with no PMH (per him), former ETOH abuser presents to ED with complaint of abdominal pain, Back pain & n/v and was found to have   Acute on chronic pancreatitis, YUNIEL & hypokalemia.    Acute on chronic pancreatitis  - CT showed numerous calcifications in the pancreatic head and pancreatic body w/ dilatation of the pancreatic duct within the pancreatic body and tail c/w  chronic pancreatitis  - lipase improving  - c/w IVF   - start clears  - Analgesia PRN  - triglyceride WNL   - abd US showed a fatty liver w/ a dilatated main pancreatic duct and coarse calcifications within the pancreatic head compatible w/ chronic   pancreatitis  - as per GI, will consider MRCP if lipase continues to go up    Leukocytosis  - likely reactive  - procal 0.46  - UA negative    Hypokalemia & Hypophosphatemia  - give PO Kphos   - c/w IV & PO KCl     Anemia  - Hgb was 6 on admission  - post 2 units pRBC  - Retic Count, B12 and Folate are WNL  - iron panel showed inflammatory anemia     ETOH use history  - c/w thiamine and add folic acid and MVN  - watch for withdrawal     YUNIEL   - suspect underlying CKD, but no baseline is available   - c/w IVF  - nephrology consulted    HTN  - c/w Norvasc     HCV  - patient says he's aware that he has Hep C and was being treated as outpatient   - hep C RNA pending    Prophylaxis:   DVT: SCD  GI: PO diet     Pt cannot leave AMA. 58 year old man with no PMH (per him), former ETOH abuser presents to ED with complaint of abdominal pain, Back pain & n/v and was found to have   Acute on chronic pancreatitis, YUNIEL & hypokalemia.    Acute on chronic pancreatitis  - CT showed numerous calcifications in the pancreatic head and pancreatic body w/ dilatation of the pancreatic duct within the pancreatic body and tail c/w  chronic pancreatitis  - lipase improving  - c/w IVF   - start clears  - Analgesia PRN  - triglyceride WNL   - abd US showed a fatty liver w/ a dilatated main pancreatic duct and coarse calcifications within the pancreatic head compatible w/ chronic   pancreatitis  - as per GI, will consider MRCP if lipase continues to go up    Leukocytosis  - likely reactive  - procal 0.46  - UA negative    Hypokalemia & Hypophosphatemia  - resolved    Anemia  - Hgb was 6 on admission  - post 2 units pRBC  - Retic Count, B12 and Folate are WNL  - iron panel showed inflammatory anemia     ETOH use history  - c/w thiamine and add folic acid and MVN  - watch for withdrawal     YUNIEL   - suspect underlying CKD, but no baseline is available   - c/w IVF  - nephrology consulted    HTN  - c/w Norvasc     HCV  - patient says he's aware that he has Hep C and was being treated as outpatient   - hep C RNA pending    Prophylaxis:   DVT: SCD  GI: PO diet     Pt cannot leave AMA.

## 2019-03-23 NOTE — PROGRESS NOTE ADULT - SUBJECTIVE AND OBJECTIVE BOX
Patient is a 58y old  Male who presents with a chief complaint of pancreatitis, anemia, abnormal renal fxn (22 Mar 2019 19:50)      HPI:  58 year old man with no PMH (per him), former ETOH abuser presents to ED with complaint of abdominal pain with Back pain.  He is AAO x 3 but does not seem to be a reliable historian regarding PMH.  He does not know any medications (says "they all don't work, whatever they are, I don't take them").  In addition to his abdominal pain and Back pain he complains of nausea and vomiting.  He offers no other complaints.    In the ED, pt's h/h 6/21.7, BMP consistent with renal failure without baseline, K 2.4, supplemented IV in ED.  EKG NSR.  Lipase 1404, BNP 3818.  CT imaging consistent with chronic pancreatitis. (19 Mar 2019 21:52)      INTERVAL HPI/OVERNIGHT EVENTS:  Pain better. " Morphine now holding me " Eating ice chips. The patient denies melena, hematochezia, hematemesis, nausea, vomiting,  constipation, diarrhea, or change in bowel movements     MEDICATIONS  (STANDING):  ALBUTerol/ipratropium for Nebulization 3 milliLiter(s) Nebulizer every 8 hours  amLODIPine   Tablet 10 milliGRAM(s) Oral daily  buDESOnide 160 MICROgram(s)/formoterol 4.5 MICROgram(s) Inhaler 2 Puff(s) Inhalation two times a day  folic acid 1 milliGRAM(s) Oral daily  morphine ER Tablet 15 milliGRAM(s) Oral every 12 hours  multivitamin/minerals 1 Tablet(s) Oral daily  nicotine -  14 mG/24Hr(s) Patch 1 patch Transdermal daily  potassium phosphate / sodium phosphate powder 1 Packet(s) Oral four times a day  sodium chloride 0.9% 1000 milliLiter(s) (100 mL/Hr) IV Continuous <Continuous>  thiamine 100 milliGRAM(s) Oral daily    MEDICATIONS  (PRN):  LORazepam     Tablet 2 milliGRAM(s) Oral every 6 hours PRN Anxiety  morphine  - Injectable 2 milliGRAM(s) IV Push every 6 hours PRN Severe Pain (7 - 10)  ondansetron Injectable 8 milliGRAM(s) IV Push every 8 hours PRN Nausea and/or Vomiting  oxyCODONE    5 mG/acetaminophen 325 mG 1 Tablet(s) Oral every 6 hours PRN Moderate Pain (4 - 6)      FAMILY HISTORY:      Allergies    No Known Allergies    Intolerances        PMH/PSH:        REVIEW OF SYSTEMS:  CONSTITUTIONAL: No fever, weight loss, or fatigue  EYES: No eye pain, visual disturbances, or discharge  ENMT:  No difficulty hearing, tinnitus, vertigo; No sinus or throat pain  NECK: No pain or stiffness  BREASTS: No pain, masses, or nipple discharge  RESPIRATORY: No cough, wheezing, chills or hemoptysis; No shortness of breath  CARDIOVASCULAR: No chest pain, palpitations, dizziness, or leg swelling  GASTROINTESTINAL: See above.  GENITOURINARY: No dysuria, frequency, hematuria, or incontinence  NEUROLOGICAL: No headaches, memory loss, loss of strength, numbness, or tremors  SKIN: No itching, burning, rashes, or lesions   LYMPH NODES: No enlarged glands  ENDOCRINE: No heat or cold intolerance; No hair loss  MUSCULOSKELETAL: No joint pain or swelling; No muscle, back, or extremity pain  PSYCHIATRIC: No depression, anxiety, mood swings, or difficulty sleeping  HEME/LYMPH: No easy bruising, or bleeding gums  ALLERGY AND IMMUNOLOGIC: No hives or eczema    Vital Signs Last 24 Hrs  T(C): 36.1 (23 Mar 2019 16:44), Max: 36.6 (22 Mar 2019 17:51)  T(F): 97 (23 Mar 2019 16:44), Max: 97.8 (22 Mar 2019 17:51)  HR: 87 (23 Mar 2019 16:44) (80 - 92)  BP: 145/69 (23 Mar 2019 16:44) (135/68 - 163/82)  BP(mean): --  RR: 19 (23 Mar 2019 16:44) (17 - 20)  SpO2: 94% (23 Mar 2019 16:44) (91% - 100%)    PHYSICAL EXAM:  GENERAL: NAD, well-groomed, well-developed  HEAD:  Atraumatic, Normocephalic  EYES: EOMI, PERRLA, conjunctiva and sclera clear  NECK: Supple, No JVD, Normal thyroid  NERVOUS SYSTEM:  Alert & Oriented , Good concentration;   CHEST/LUNG: Clear to percussion bilaterally; No rales, rhonchi, wheezing, or rubs  HEART: Regular rate and rhythm; No murmurs, rubs, or gallops  ABDOMEN: Soft, mild diffuse tenderness, Nondistended; Bowel sounds present  EXTREMITIES:  2+ Peripheral Pulses, No clubbing, cyanosis, or edema  LYMPH: No lymphadenopathy noted  SKIN: No rashes or lesions    LAB   @ 07:48  amylase --   lipase 1223    @ 07:50  amylase --   lipase 2946    @ 08:11  amylase 1007   lipase 6190    @ 08:17  amylase --   lipase 2260    @ 17:03  amylase --   lipase 1404                           8.1    16.33 )-----------( 183      ( 23 Mar 2019 07:48 )             27.6       CBC:   07:48  WBC 16.33   Hgb 8.1   Hct 27.6   Plts 183  MCV 82.9   @ 07:50  WBC 16.17   Hgb 9.1   Hct 31.0   Plts 202  MCV 81.6   @ 08:11  WBC 10.63   Hgb 8.6   Hct 28.4   Plts 208  MCV 79.6   @ 10:02  WBC --   Hgb --   Hct 29.7   Plts --  MCV --   @ 08:17  WBC 12.69   Hgb 8.7   Hct 28.3   Plts 243  MCV 78.4   @ 17:03  WBC 14.23   Hgb 6.0   Hct 21.7   Plts 312  MCV 76.7      Chemistry:   @ 07:48  Na+ 141  K+ 4.4  Cl- 112  CO2 21  BUN 44  Cr 2.14      @ 07:50  Na+ 140  K+ 3.4  Cl- 111  CO2 20  BUN 59  Cr 2.63      @ 08:11  Na+ 134  K+ 2.7  Cl- 105  CO2 18  BUN 71  Cr 3.07      @ 08:17  Na+ 133  K+ 2.3  Cl- 95  CO2 19  BUN 90  Cr 4.08      @ 17:03  Na+ 132  K+ 2.4  Cl- 88  CO2 17  BUN 92  Cr 4.48         Glucose, Serum: 100 mg/dL ( @ 07:48)  Glucose, Serum: 100 mg/dL ( @ 07:50)  Glucose, Serum: 96 mg/dL ( @ 08:11)  Glucose, Serum: 101 mg/dL ( @ 08:17)  Glucose, Serum: 102 mg/dL (03-19 @ 17:03)      23 Mar 2019 07:48    141    |  112    |  44     ----------------------------<  100    4.4     |  21     |  2.14   22 Mar 2019 07:50    140    |  111    |  59     ----------------------------<  100    3.4     |  20     |  2.63   21 Mar 2019 08:11    134    |  105    |  71     ----------------------------<  96     2.7     |  18     |  3.07   20 Mar 2019 08:17    133    |  95     |  90     ----------------------------<  101    2.3     |  19     |  4.08   19 Mar 2019 17:03    132    |  88     |  92     ----------------------------<  102    2.4     |  17     |  4.48     Ca    8.1        23 Mar 2019 07:48  Ca    8.1        22 Mar 2019 07:50  Ca    7.6        21 Mar 2019 08:11  Ca    6.7        20 Mar 2019 08:17  Ca    7.1        19 Mar 2019 17:03  Phos  3.7       23 Mar 2019 07:48  Phos  1.4       22 Mar 2019 07:50  Phos  1.0       21 Mar 2019 08:11  Phos  2.3       20 Mar 2019 08:17  Mg     2.4       23 Mar 2019 07:48  Mg     3.1       22 Mar 2019 07:50  Mg     2.8       21 Mar 2019 08:11  Mg     3.2       20 Mar 2019 08:17    TPro  6.5    /  Alb  2.8    /  TBili  0.4    /  DBili  x      /  AST  27     /  ALT  17     /  AlkPhos  99     22 Mar 2019 07:50  TPro  5.8    /  Alb  2.7    /  TBili  0.3    /  DBili  x      /  AST  30     /  ALT  15     /  AlkPhos  93     21 Mar 2019 08:11  TPro  6.4    /  Alb  3.0    /  TBili  0.4    /  DBili  x      /  AST  27     /  ALT  15     /  AlkPhos  102    20 Mar 2019 08:17  TPro  6.4    /  Alb  3.1    /  TBili  0.5    /  DBili  x      /  AST  34     /  ALT  16     /  AlkPhos  107    19 Mar 2019 17:03          Urinalysis Basic - ( 23 Mar 2019 14:21 )    Color: Yellow / Appearance: Clear / S.010 / pH: x  Gluc: x / Ketone: Negative  / Bili: Negative / Urobili: Negative mg/dL   Blood: x / Protein: 30 mg/dL / Nitrite: Negative   Leuk Esterase: Negative / RBC: 0-2 /HPF / WBC x   Sq Epi: x / Non Sq Epi: Occasional / Bacteria: Few        CAPILLARY BLOOD GLUCOSE          C-Reactive Protein, Serum: 8.11 mg/dL ( @ 14:54)      RADIOLOGY & ADDITIONAL TESTS:    Imaging Personally Reviewed:  [ ] YES  [ ] NO    Consultant(s) Notes Reviewed:  [ ] YES  [ ] NO    Care Discussed with Consultants/Other Providers [ ] YES  [ ] NO

## 2019-03-24 LAB
ALBUMIN SERPL ELPH-MCNC: 2.5 G/DL — LOW (ref 3.3–5)
ALP SERPL-CCNC: 111 U/L — SIGNIFICANT CHANGE UP (ref 40–120)
ALT FLD-CCNC: 15 U/L — SIGNIFICANT CHANGE UP (ref 12–78)
ANION GAP SERPL CALC-SCNC: 9 MMOL/L — SIGNIFICANT CHANGE UP (ref 5–17)
AST SERPL-CCNC: 17 U/L — SIGNIFICANT CHANGE UP (ref 15–37)
BILIRUB SERPL-MCNC: 0.3 MG/DL — SIGNIFICANT CHANGE UP (ref 0.2–1.2)
BUN SERPL-MCNC: 33 MG/DL — HIGH (ref 7–23)
CALCIUM SERPL-MCNC: 8.4 MG/DL — LOW (ref 8.5–10.1)
CHLORIDE SERPL-SCNC: 111 MMOL/L — HIGH (ref 96–108)
CO2 SERPL-SCNC: 18 MMOL/L — LOW (ref 22–31)
CREAT SERPL-MCNC: 1.9 MG/DL — HIGH (ref 0.5–1.3)
CRP SERPL-MCNC: 16.1 MG/DL — HIGH (ref 0–0.4)
GLUCOSE SERPL-MCNC: 107 MG/DL — HIGH (ref 70–99)
HCT VFR BLD CALC: 31.3 % — LOW (ref 39–50)
HGB BLD-MCNC: 9.1 G/DL — LOW (ref 13–17)
LACTATE SERPL-SCNC: 0.7 MMOL/L — SIGNIFICANT CHANGE UP (ref 0.7–2)
LIDOCAIN IGE QN: 1160 U/L — HIGH (ref 73–393)
MAGNESIUM SERPL-MCNC: 2.2 MG/DL — SIGNIFICANT CHANGE UP (ref 1.6–2.6)
MCHC RBC-ENTMCNC: 23.8 PG — LOW (ref 27–34)
MCHC RBC-ENTMCNC: 29.1 GM/DL — LOW (ref 32–36)
MCV RBC AUTO: 81.9 FL — SIGNIFICANT CHANGE UP (ref 80–100)
NRBC # BLD: 0 /100 WBCS — SIGNIFICANT CHANGE UP (ref 0–0)
PHOSPHATE SERPL-MCNC: 3.8 MG/DL — SIGNIFICANT CHANGE UP (ref 2.5–4.5)
PLATELET # BLD AUTO: 222 K/UL — SIGNIFICANT CHANGE UP (ref 150–400)
POTASSIUM SERPL-MCNC: 4.4 MMOL/L — SIGNIFICANT CHANGE UP (ref 3.5–5.3)
POTASSIUM SERPL-SCNC: 4.4 MMOL/L — SIGNIFICANT CHANGE UP (ref 3.5–5.3)
PROT SERPL-MCNC: 6.6 GM/DL — SIGNIFICANT CHANGE UP (ref 6–8.3)
RBC # BLD: 3.82 M/UL — LOW (ref 4.2–5.8)
RBC # FLD: 20 % — HIGH (ref 10.3–14.5)
SODIUM SERPL-SCNC: 138 MMOL/L — SIGNIFICANT CHANGE UP (ref 135–145)
WBC # BLD: 19.5 K/UL — HIGH (ref 3.8–10.5)
WBC # FLD AUTO: 19.5 K/UL — HIGH (ref 3.8–10.5)

## 2019-03-24 PROCEDURE — 99233 SBSQ HOSP IP/OBS HIGH 50: CPT

## 2019-03-24 RX ORDER — COLCHICINE 0.6 MG
0.6 TABLET ORAL THREE TIMES A DAY
Qty: 0 | Refills: 0 | Status: COMPLETED | OUTPATIENT
Start: 2019-03-24 | End: 2019-03-25

## 2019-03-24 RX ORDER — COLCHICINE 0.6 MG
0.6 TABLET ORAL DAILY
Qty: 0 | Refills: 0 | Status: DISCONTINUED | OUTPATIENT
Start: 2019-03-26 | End: 2019-03-29

## 2019-03-24 RX ADMIN — Medication 1 TABLET(S): at 11:15

## 2019-03-24 RX ADMIN — MORPHINE SULFATE 15 MILLIGRAM(S): 50 CAPSULE, EXTENDED RELEASE ORAL at 09:52

## 2019-03-24 RX ADMIN — MORPHINE SULFATE 2 MILLIGRAM(S): 50 CAPSULE, EXTENDED RELEASE ORAL at 05:47

## 2019-03-24 RX ADMIN — Medication 1 PATCH: at 11:06

## 2019-03-24 RX ADMIN — MORPHINE SULFATE 15 MILLIGRAM(S): 50 CAPSULE, EXTENDED RELEASE ORAL at 08:52

## 2019-03-24 RX ADMIN — Medication 1 PACKET(S): at 23:41

## 2019-03-24 RX ADMIN — Medication 1 MILLIGRAM(S): at 11:15

## 2019-03-24 RX ADMIN — MORPHINE SULFATE 2 MILLIGRAM(S): 50 CAPSULE, EXTENDED RELEASE ORAL at 17:04

## 2019-03-24 RX ADMIN — AMLODIPINE BESYLATE 10 MILLIGRAM(S): 2.5 TABLET ORAL at 05:19

## 2019-03-24 RX ADMIN — ONDANSETRON 8 MILLIGRAM(S): 8 TABLET, FILM COATED ORAL at 11:15

## 2019-03-24 RX ADMIN — Medication 3 MILLILITER(S): at 21:13

## 2019-03-24 RX ADMIN — Medication 3 MILLILITER(S): at 05:43

## 2019-03-24 RX ADMIN — Medication 1 PACKET(S): at 05:19

## 2019-03-24 RX ADMIN — MORPHINE SULFATE 2 MILLIGRAM(S): 50 CAPSULE, EXTENDED RELEASE ORAL at 11:15

## 2019-03-24 RX ADMIN — OXYCODONE AND ACETAMINOPHEN 1 TABLET(S): 5; 325 TABLET ORAL at 02:38

## 2019-03-24 RX ADMIN — OXYCODONE AND ACETAMINOPHEN 1 TABLET(S): 5; 325 TABLET ORAL at 03:38

## 2019-03-24 RX ADMIN — MORPHINE SULFATE 2 MILLIGRAM(S): 50 CAPSULE, EXTENDED RELEASE ORAL at 17:20

## 2019-03-24 RX ADMIN — OXYCODONE AND ACETAMINOPHEN 1 TABLET(S): 5; 325 TABLET ORAL at 15:35

## 2019-03-24 RX ADMIN — SODIUM CHLORIDE 100 MILLILITER(S): 9 INJECTION, SOLUTION INTRAVENOUS at 05:19

## 2019-03-24 RX ADMIN — MORPHINE SULFATE 2 MILLIGRAM(S): 50 CAPSULE, EXTENDED RELEASE ORAL at 05:17

## 2019-03-24 RX ADMIN — Medication 1 PACKET(S): at 17:06

## 2019-03-24 RX ADMIN — Medication 0.6 MILLIGRAM(S): at 21:45

## 2019-03-24 RX ADMIN — Medication 1 PATCH: at 19:57

## 2019-03-24 RX ADMIN — Medication 1 PATCH: at 21:45

## 2019-03-24 RX ADMIN — OXYCODONE AND ACETAMINOPHEN 1 TABLET(S): 5; 325 TABLET ORAL at 14:35

## 2019-03-24 RX ADMIN — Medication 3 MILLILITER(S): at 14:22

## 2019-03-24 RX ADMIN — MORPHINE SULFATE 15 MILLIGRAM(S): 50 CAPSULE, EXTENDED RELEASE ORAL at 23:40

## 2019-03-24 RX ADMIN — SODIUM CHLORIDE 100 MILLILITER(S): 9 INJECTION, SOLUTION INTRAVENOUS at 23:39

## 2019-03-24 RX ADMIN — Medication 100 MILLIGRAM(S): at 11:15

## 2019-03-24 NOTE — PROGRESS NOTE ADULT - ASSESSMENT
58 year old man with no PMH (per him), former ETOH abuser presents to ED with complaint of abdominal pain, Back pain & n/v and was found to have   Acute on chronic pancreatitis, YUNIEL & hypokalemia.    Acute on chronic pancreatitis  - CT showed numerous calcifications in the pancreatic head and pancreatic body w/ dilatation of the pancreatic duct within the pancreatic body and tail c/w  chronic pancreatitis  - lipase improving  - c/w IVF   - advanced to fulls, will advance to solids tomorrow if he tolerates diet  - Analgesia PRN  - triglyceride WNL   - abd US showed a fatty liver w/ a dilatated main pancreatic duct and coarse calcifications within the pancreatic head compatible w/ chronic   pancreatitis  - as per GI, will consider MRCP if lipase continues to go up    Leukocytosis  - ID consulted. As per our conversation, Dr. Coleman believes patient may have R knee gout - will start colchacine & watch CrCl  - procal 0.46  - UA negative  - cxs pending     Hypokalemia & Hypophosphatemia  - resolved    Anemia  - Hgb was 6 on admission  - post 2 units pRBC  - Retic Count, B12 and Folate are WNL  - iron panel showed inflammatory anemia     ETOH use history  - c/w thiamine and add folic acid and MVN  - watch for withdrawal     YUNIEL   - suspect underlying CKD, but no baseline is available   - c/w IVF  - nephrology consulted    HTN  - c/w Norvasc     HCV  - patient says he's aware that he has Hep C and was being treated as outpatient   - hep C RNA pending    Prophylaxis:   DVT: SCD  GI: PO diet     Pt cannot leave AMA as per psych.

## 2019-03-24 NOTE — PROGRESS NOTE ADULT - SUBJECTIVE AND OBJECTIVE BOX
Patient is a 58y old  Male who presents with a chief complaint of pancreatitis, anemia, abnormal renal fxn (23 Mar 2019 22:26)      HPI:  58 year old man with no PMH (per him), former ETOH abuser presents to ED with complaint of abdominal pain with Back pain.  He is AAO x 3 but does not seem to be a reliable historian regarding PMH.  He does not know any medications (says "they all don't work, whatever they are, I don't take them").  In addition to his abdominal pain and Back pain he complains of nausea and vomiting.  He offers no other complaints.    In the ED, pt's h/h 6/21.7, BMP consistent with renal failure without baseline, K 2.4, supplemented IV in ED.  EKG NSR.  Lipase 1404, BNP 3818.  CT imaging consistent with chronic pancreatitis. (19 Mar 2019 21:52)      INTERVAL HPI/OVERNIGHT EVENTS:  Abdominal pain improving. Coughing (+). No N/V. tolerating diet. No diarrhea    MEDICATIONS  (STANDING):  ALBUTerol/ipratropium for Nebulization 3 milliLiter(s) Nebulizer every 8 hours  amLODIPine   Tablet 10 milliGRAM(s) Oral daily  buDESOnide 160 MICROgram(s)/formoterol 4.5 MICROgram(s) Inhaler 2 Puff(s) Inhalation two times a day  folic acid 1 milliGRAM(s) Oral daily  morphine ER Tablet 15 milliGRAM(s) Oral every 12 hours  multivitamin/minerals 1 Tablet(s) Oral daily  nicotine -  14 mG/24Hr(s) Patch 1 patch Transdermal daily  potassium phosphate / sodium phosphate powder 1 Packet(s) Oral four times a day  sodium chloride 0.9% 1000 milliLiter(s) (100 mL/Hr) IV Continuous <Continuous>  thiamine 100 milliGRAM(s) Oral daily    MEDICATIONS  (PRN):  LORazepam     Tablet 2 milliGRAM(s) Oral every 6 hours PRN Anxiety  morphine  - Injectable 2 milliGRAM(s) IV Push every 6 hours PRN Severe Pain (7 - 10)  ondansetron Injectable 8 milliGRAM(s) IV Push every 8 hours PRN Nausea and/or Vomiting  oxyCODONE    5 mG/acetaminophen 325 mG 1 Tablet(s) Oral every 6 hours PRN Moderate Pain (4 - 6)      FAMILY HISTORY:      Allergies    No Known Allergies    Intolerances        PMH/PSH:        REVIEW OF SYSTEMS:  CONSTITUTIONAL: No fever, weight loss, or fatigue  EYES: No eye pain, visual disturbances, or discharge  ENMT:  No difficulty hearing, tinnitus, vertigo; No sinus or throat pain  NECK: No pain or stiffness  BREASTS: No pain, masses, or nipple discharge  RESPIRATORY: No wheezing, chills or hemoptysis; No shortness of breath  CARDIOVASCULAR: No chest pain, palpitations, dizziness, or leg swelling  GASTROINTESTINAL: See above  GENITOURINARY: No dysuria, frequency, hematuria, or incontinence  NEUROLOGICAL: No headaches, memory loss, loss of strength, numbness, or tremors  SKIN: No itching, burning, rashes, or lesions   LYMPH NODES: No enlarged glands  ENDOCRINE: No heat or cold intolerance; No hair loss  MUSCULOSKELETAL: No joint pain or swelling; No muscle, back, or extremity pain  PSYCHIATRIC: No depression, anxiety, mood swings, or difficulty sleeping  HEME/LYMPH: No easy bruising, or bleeding gums  ALLERGY AND IMMUNOLOGIC: No hives or eczema    Vital Signs Last 24 Hrs  T(C): 36 (24 Mar 2019 11:50), Max: 36.6 (23 Mar 2019 23:55)  T(F): 96.8 (24 Mar 2019 11:50), Max: 97.9 (23 Mar 2019 23:55)  HR: 87 (24 Mar 2019 14:22) (78 - 96)  BP: 148/77 (24 Mar 2019 11:50) (145/69 - 157/79)  BP(mean): --  RR: 18 (24 Mar 2019 11:50) (18 - 19)  SpO2: 93% (24 Mar 2019 14:22) (86% - 94%)    PHYSICAL EXAM:  GENERAL: NAD, well-groomed, well-developed  HEAD:  Atraumatic, Normocephalic  EYES: EOMI, PERRLA, conjunctiva and sclera clear  NECK: Supple, No JVD, Normal thyroid  NERVOUS SYSTEM:  Alert & Oriented X3, Good concentration; Motor Strength 5/5 B/L upper and lower extremities;   CHEST/LUNG: Clear to percussion bilaterally; No rales, rhonchi, wheezing, or rubs  HEART: Regular rate and rhythm; No murmurs, rubs, or gallops  ABDOMEN: Soft, Nontender, Nondistended; Bowel sounds present  EXTREMITIES:  2+ Peripheral Pulses, No clubbing, cyanosis, or edema  LYMPH: No lymphadenopathy noted  SKIN: No rashes or lesions    LAB   @ 07:12  amylase --   lipase 1160    @ 07:48  amylase --   lipase 1223    @ 07:50  amylase --   lipase 2946    @ 08:11  amylase 1007   lipase 6190    @ 08:17  amylase --   lipase 2260    @ 17:03  amylase --   lipase 1404                           9.1    19.50 )-----------( 222      ( 24 Mar 2019 07:12 )             31.3       CBC:   @ 07:12  WBC 19.50   Hgb 9.1   Hct 31.3   Plts 222  MCV 81.9   @ 07:48  WBC 16.33   Hgb 8.1   Hct 27.6   Plts 183  MCV 82.9   @ 07:50  WBC 16.17   Hgb 9.1   Hct 31.0   Plts 202  MCV 81.6   @ 08:11  WBC 10.63   Hgb 8.6   Hct 28.4   Plts 208  MCV 79.6   @ 10:02  WBC --   Hgb --   Hct 29.7   Plts --  MCV --   @ 08:17  WBC 12.69   Hgb 8.7   Hct 28.3   Plts 243  MCV 78.4   @ 17:03  WBC 14.23   Hgb 6.0   Hct 21.7   Plts 312  MCV 76.7      Chemistry:   @ 07:12  Na+ 138  K+ 4.4  Cl- 111  CO2 18  BUN 33  Cr 1.90      @ 07:48  Na+ 141  K+ 4.4  Cl- 112  CO2 21  BUN 44  Cr 2.14      @ 07:50  Na+ 140  K+ 3.4  Cl- 111  CO2 20  BUN 59  Cr 2.63      @ 08:11  Na+ 134  K+ 2.7  Cl- 105  CO2 18  BUN 71  Cr 3.07      @ 08:17  Na+ 133  K+ 2.3  Cl- 95  CO2 19  BUN 90  Cr 4.08      @ 17:03  Na+ 132  K+ 2.4  Cl- 88  CO2 17  BUN 92  Cr 4.48         Glucose, Serum: 107 mg/dL ( @ 07:12)  Glucose, Serum: 100 mg/dL ( @ 07:48)  Glucose, Serum: 100 mg/dL ( @ 07:50)  Glucose, Serum: 96 mg/dL ( @ 08:11)  Glucose, Serum: 101 mg/dL ( @ 08:17)  Glucose, Serum: 102 mg/dL ( @ 17:03)      24 Mar 2019 07:12    138    |  111    |  33     ----------------------------<  107    4.4     |  18     |  1.90   23 Mar 2019 07:48    141    |  112    |  44     ----------------------------<  100    4.4     |  21     |  2.14   22 Mar 2019 07:50    140    |  111    |  59     ----------------------------<  100    3.4     |  20     |  2.63   21 Mar 2019 08:11    134    |  105    |  71     ----------------------------<  96     2.7     |  18     |  3.07   20 Mar 2019 08:17    133    |  95     |  90     ----------------------------<  101    2.3     |  19     |  4.08   19 Mar 2019 17:03    132    |  88     |  92     ----------------------------<  102    2.4     |  17     |  4.48     Ca    8.4        24 Mar 2019 07:12  Ca    8.1        23 Mar 2019 07:48  Ca    8.1        22 Mar 2019 07:50  Ca    7.6        21 Mar 2019 08:11  Ca    6.7        20 Mar 2019 08:17  Ca    7.1        19 Mar 2019 17:03  Phos  3.8       24 Mar 2019 07:12  Phos  3.7       23 Mar 2019 07:48  Phos  1.4       22 Mar 2019 07:50  Phos  1.0       21 Mar 2019 08:11  Phos  2.3       20 Mar 2019 08:17  Mg     2.2       24 Mar 2019 07:12  Mg     2.4       23 Mar 2019 07:48  Mg     3.1       22 Mar 2019 07:50  Mg     2.8       21 Mar 2019 08:11  Mg     3.2       20 Mar 2019 08:17    TPro  6.6    /  Alb  2.5    /  TBili  0.3    /  DBili  x      /  AST  17     /  ALT  15     /  AlkPhos  111    24 Mar 2019 07:12  TPro  6.5    /  Alb  2.8    /  TBili  0.4    /  DBili  x      /  AST  27     /  ALT  17     /  AlkPhos  99     22 Mar 2019 07:50  TPro  5.8    /  Alb  2.7    /  TBili  0.3    /  DBili  x      /  AST  30     /  ALT  15     /  AlkPhos  93     21 Mar 2019 08:11  TPro  6.4    /  Alb  3.0    /  TBili  0.4    /  DBili  x      /  AST  27     /  ALT  15     /  AlkPhos  102    20 Mar 2019 08:17  TPro  6.4    /  Alb  3.1    /  TBili  0.5    /  DBili  x      /  AST  34     /  ALT  16     /  AlkPhos  107    19 Mar 2019 17:03          Urinalysis Basic - ( 23 Mar 2019 14:21 )    Color: Yellow / Appearance: Clear / S.010 / pH: x  Gluc: x / Ketone: Negative  / Bili: Negative / Urobili: Negative mg/dL   Blood: x / Protein: 30 mg/dL / Nitrite: Negative   Leuk Esterase: Negative / RBC: 0-2 /HPF / WBC x   Sq Epi: x / Non Sq Epi: Occasional / Bacteria: Few        CAPILLARY BLOOD GLUCOSE          C-Reactive Protein, Serum: 16.10 mg/dL ( @ 12:39)  C-Reactive Protein, Serum: 8.11 mg/dL ( @ 14:54)      RADIOLOGY & ADDITIONAL TESTS:    Imaging Personally Reviewed:  [ ] YES  [ ] NO    Consultant(s) Notes Reviewed:  [ ] YES  [ ] NO    Care Discussed with Consultants/Other Providers [ ] YES  [ ] NO

## 2019-03-24 NOTE — PROGRESS NOTE ADULT - PROBLEM SELECTOR PLAN 1
Hydration per renal  Serum lipase elevated , slightly  better today  Triglycerides minimally elevated  ---- slowly advance diet and observe  Abdominal sonogram  once tolerating regular diet will need Creon 75502 units TID with meals.

## 2019-03-24 NOTE — PROGRESS NOTE ADULT - SUBJECTIVE AND OBJECTIVE BOX
58 year old man with no PMH (per him), former ETOH abuser presents to ED with complaint of abdominal pain, Back pain & n/v and was found to have   Acute on chronic pancreatitis, YUNIEL & hypokalemia. He is lying in bed in NAD.       MEDICATIONS  (STANDING):  ALBUTerol/ipratropium for Nebulization 3 milliLiter(s) Nebulizer every 8 hours  amLODIPine   Tablet 10 milliGRAM(s) Oral daily  buDESOnide 160 MICROgram(s)/formoterol 4.5 MICROgram(s) Inhaler 2 Puff(s) Inhalation two times a day  folic acid 1 milliGRAM(s) Oral daily  morphine ER Tablet 15 milliGRAM(s) Oral every 12 hours  multivitamin/minerals 1 Tablet(s) Oral daily  nicotine -  14 mG/24Hr(s) Patch 1 patch Transdermal daily  potassium phosphate / sodium phosphate powder 1 Packet(s) Oral four times a day  sodium chloride 0.9% 1000 milliLiter(s) (100 mL/Hr) IV Continuous <Continuous>  thiamine 100 milliGRAM(s) Oral daily    MEDICATIONS  (PRN):  LORazepam     Tablet 2 milliGRAM(s) Oral every 6 hours PRN Anxiety  morphine  - Injectable 2 milliGRAM(s) IV Push every 6 hours PRN Severe Pain (7 - 10)  ondansetron Injectable 8 milliGRAM(s) IV Push every 8 hours PRN Nausea and/or Vomiting  oxyCODONE    5 mG/acetaminophen 325 mG 1 Tablet(s) Oral every 6 hours PRN Moderate Pain (4 - 6)      Allergies    No Known Allergies    Intolerances        Vital Signs Last 24 Hrs  T(C): 35.6 (24 Mar 2019 17:29), Max: 36.6 (23 Mar 2019 23:55)  T(F): 96 (24 Mar 2019 17:29), Max: 97.9 (23 Mar 2019 23:55)  HR: 93 (24 Mar 2019 17:29) (78 - 96)  BP: 140/79 (24 Mar 2019 17:29) (140/79 - 157/79)  BP(mean): --  RR: 18 (24 Mar 2019 17:29) (18 - 19)  SpO2: 90% (24 Mar 2019 17:29) (86% - 94%)    PHYSICAL EXAM:  GENERAL: NAD, well-groomed, well-developed  HEAD:  Atraumatic, Normocephalic  EYES: EOMI, PERRLA   NECK: Supple   NERVOUS SYSTEM: Alert, no tremors  CHEST/LUNG: Clear to auscultation bilaterally; No rales, rhonchi, wheezing, or rubs  HEART: Regular rate and rhythm; No murmurs, rubs, or gallops  ABDOMEN: Soft, Nontender, Nondistended; Bowel sounds present  EXTREMITIES: R knee mildly warm and inflamed     LABS:                        9.1    19.50 )-----------( 222      ( 24 Mar 2019 07:12 )             31.3         138  |  111<H>  |  33<H>  ----------------------------<  107<H>  4.4   |  18<L>  |  1.90<H>    Ca    8.4<L>      24 Mar 2019 07:12  Phos  3.8       Mg     2.2         TPro  6.6  /  Alb  2.5<L>  /  TBili  0.3  /  DBili  x   /  AST  17  /  ALT  15  /  AlkPhos  111        Urinalysis Basic - ( 23 Mar 2019 14:21 )    Color: Yellow / Appearance: Clear / S.010 / pH: x  Gluc: x / Ketone: Negative  / Bili: Negative / Urobili: Negative mg/dL   Blood: x / Protein: 30 mg/dL / Nitrite: Negative   Leuk Esterase: Negative / RBC: 0-2 /HPF / WBC x   Sq Epi: x / Non Sq Epi: Occasional / Bacteria: Few    Culture - Urine (collected 23 Mar 2019 21:39)  Source: .Urine  Preliminary Report (24 Mar 2019 17:12):    >100,000 CFU/ml Gram positive organisms    Culture - Urine (collected 20 Mar 2019 10:40)  Source: .Urine  Final Report (21 Mar 2019 19:09):    <10,000 CFU/mL Normal Urogenital Mariah      RADIOLOGY & ADDITIONAL TESTS:    19 @ 07:01  -  19 @ 07:00  --------------------------------------------------------  IN:    Oral Fluid: 240 mL    sodium chloride 0.9%: 1200 mL  Total IN: 1440 mL    OUT:    Voided: 250 mL  Total OUT: 250 mL    Total NET: 1190 mL

## 2019-03-24 NOTE — CONSULT NOTE ADULT - ASSESSMENT
persistent leucocytosis   ?? gout that can cause all this   case seen and discussed with PMD on the floor   utox ordered   will watch   ch pancreatitis

## 2019-03-24 NOTE — CONSULT NOTE ADULT - SUBJECTIVE AND OBJECTIVE BOX
HPI:  58 year old man with no PMH (per him), former ETOH abuser presents to ED with complaint of abdominal pain with Back pain.  He is AAO x 3 but does not seem to be a reliable historian regarding PMH.  He does not know any medications (says "they all don't work, whatever they are, I don't take them").  In addition to his abdominal pain and Back pain he complains of nausea and vomiting.  He offers no other complaints.    In the ED, pt's h/h 6/21.7, BMP consistent with renal failure without baseline, K 2.4, supplemented IV in ED.  EKG NSR.  Lipase 1404, BNP 3818.  CT imaging consistent with chronic pancreatitis. (19 Mar 2019 21:52)  seen and discussed with PMD today   also spoke to mom on the phone     PAST MEDICAL & SURGICAL HISTORY:      SOCHX:   daily tobacco,  -plus   alcohol  also lots of other drugs per mom    FMHX: FA/MO   noncontributory   sometimes homeless   sometimes goes to his mom which is his mailing address     Recent Travel:    Immunizations:    Allergies    No Known Allergies    Intolerances        MEDICATIONS  (STANDING):  ALBUTerol/ipratropium for Nebulization 3 milliLiter(s) Nebulizer every 8 hours  amLODIPine   Tablet 10 milliGRAM(s) Oral daily  buDESOnide 160 MICROgram(s)/formoterol 4.5 MICROgram(s) Inhaler 2 Puff(s) Inhalation two times a day  folic acid 1 milliGRAM(s) Oral daily  morphine ER Tablet 15 milliGRAM(s) Oral every 12 hours  multivitamin/minerals 1 Tablet(s) Oral daily  nicotine -  14 mG/24Hr(s) Patch 1 patch Transdermal daily  pancrelipase  (CREON 36,000 Lipase Units) 2 Capsule(s) Oral three times a day with meals  sodium chloride 0.9%. 1000 milliLiter(s) (100 mL/Hr) IV Continuous <Continuous>  thiamine 100 milliGRAM(s) Oral daily    MEDICATIONS  (PRN):  LORazepam     Tablet 2 milliGRAM(s) Oral every 6 hours PRN Anxiety  morphine  - Injectable 2 milliGRAM(s) IV Push every 6 hours PRN Severe Pain (7 - 10)  ondansetron Injectable 8 milliGRAM(s) IV Push every 8 hours PRN Nausea and/or Vomiting  oxyCODONE    5 mG/acetaminophen 325 mG 1 Tablet(s) Oral every 6 hours PRN Moderate Pain (4 - 6)      REVIEW OF SYSTEMS:  CONSTITUTIONAL: No fever, weight loss, or fatigue  EYES: No eye pain, visual disturbances, or discharge  ENMT:  No difficulty hearing, tinnitus, vertigo; No sinus or throat pain  NECK: No pain or stiffness  ongoing hiccups per patient and his mom   mom told me about drugs he refused to tell me which ones   RESPIRATORY: No cough, wheezing, chills or hemoptysis; No shortness of breath  CARDIOVASCULAR: No chest pain, palpitations, dizziness, or leg swelling  GASTROINTESTINAL: No abdominal or epigastric pain. No nausea, vomiting, or hematemesis; No diarrhea or constipation. No melena or hematochezia.  GENITOURINARY: No dysuria, frequency, hematuria, or incontinence  NEUROLOGICAL: No headaches, memory loss, loss of strength, numbness, or tremors  SKIN: No itching, burning, rashes, or lesions   LYMPH NODES: No enlarged glands  ENDOCRINE: No heat or cold intolerance; No hair loss  MUSCULOSKELETAL: has had gout   feels his knee has the gout ;; discussed with PMD   PSYCHIATRIC: No depression, anxiety, mood swings, or difficulty sleeping  HEME/LYMPH: No easy bruising, or bleeding gums  ALLERGY AND IMMUNOLOGIC: No hives or eczema    VITAL SIGNS:    T(C): 36.2 (03-25-19 @ 17:35), Max: 36.5 (03-25-19 @ 05:40)  T(F): 97.1 (03-25-19 @ 17:35), Max: 97.7 (03-25-19 @ 05:40)  HR: 89 (03-25-19 @ 17:35) (84 - 98)  BP: 135/73 (03-25-19 @ 17:35) (135/73 - 158/88)  RR: 18 (03-25-19 @ 17:35) (18 - 18)  SpO2: 92% (03-25-19 @ 17:35) (91% - 96%)    PHYSICAL EXAM:    GENERAL: NAD, unkempt cachectic   HEAD:  Atraumatic, Normocephalic  EYES: EOMI, PERRLA, conjunctiva and sclera clear  ENMT: No tonsillar erythema, exudates, or enlargement; Moist mucous membranes,   NECK: Supple, No JVD, Normal thyroid  NERVOUS SYSTEM:  Alert & Oriented X3, Good concentration; Motor Strength 5/5 B/L upper and lower extremities;  CHEST/LUNG: Clear to auscultation bilaterally; No rales, rhonchi, wheezing bilaterally  HEART: Regular rate and rhythm; No murmurs, rubs, or gallops  ABDOMEN: Soft, Nontender, Nondistended; Bowel sounds present  EXTREMITIES:  2+ Peripheral Pulses, No clubbing, cyanosis, or edema  mild knee inflammation   really no findings suggestive of ac gout   but  patient feels it is his gout acting up ??  LYMPH: No lymphadenopathy noted  SKIN: No rashes or lesions  BACK: no pressor sore     LABS:                         7.8    18.37 )-----------( 295      ( 25 Mar 2019 06:26 )             26.8     03-25    141  |  114<H>  |  25<H>  ----------------------------<  98  4.8   |  17<L>  |  1.94<H>    Ca    8.0<L>      25 Mar 2019 06:26  Phos  3.5     03-25  Mg     2.1     03-25    TPro  6.1  /  Alb  2.2<L>  /  TBili  0.3  /  DBili  x   /  AST  22  /  ALT  14  /  AlkPhos  107  03-25    LIVER FUNCTIONS - ( 25 Mar 2019 06:26 )  Alb: 2.2 g/dL / Pro: 6.1 gm/dL / ALK PHOS: 107 U/L / ALT: 14 U/L / AST: 22 U/L / GGT: x                                   Culture Results:   >100,000 CFU/ml Gram positive organisms (03-23 @ 21:39)  Culture Results:   <10,000 CFU/mL Normal Urogenital Mariah (03-20 @ 10:40)                Radiology:  < from: CT Abdomen and Pelvis w/ Oral Cont (03.19.19 @ 19:21) >  IMPRESSION:     Numerous calcifications in the pancreatic head and pancreatic body.   Dilatation of the pancreatic duct within the pancreatic body and tail.   Findings likely representing chronic pancreatitis. Limited evaluation for   focal mass secondary to lack of IV contrast. If concern for focal mass,   repeat CT or MRI with and without contrast can be obtained.  No definite   CT findings of acute pancreatitis.                  < end of copied text >

## 2019-03-25 LAB
-  AMPICILLIN: SIGNIFICANT CHANGE UP
-  CIPROFLOXACIN: SIGNIFICANT CHANGE UP
-  LEVOFLOXACIN: SIGNIFICANT CHANGE UP
-  NITROFURANTOIN: SIGNIFICANT CHANGE UP
-  TETRACYCLINE: SIGNIFICANT CHANGE UP
-  VANCOMYCIN: SIGNIFICANT CHANGE UP
ALBUMIN SERPL ELPH-MCNC: 2.2 G/DL — LOW (ref 3.3–5)
ALP SERPL-CCNC: 107 U/L — SIGNIFICANT CHANGE UP (ref 40–120)
ALT FLD-CCNC: 14 U/L — SIGNIFICANT CHANGE UP (ref 12–78)
ANION GAP SERPL CALC-SCNC: 10 MMOL/L — SIGNIFICANT CHANGE UP (ref 5–17)
AST SERPL-CCNC: 22 U/L — SIGNIFICANT CHANGE UP (ref 15–37)
BASOPHILS # BLD AUTO: 0 K/UL — SIGNIFICANT CHANGE UP (ref 0–0.2)
BASOPHILS NFR BLD AUTO: 0 % — SIGNIFICANT CHANGE UP (ref 0–2)
BILIRUB SERPL-MCNC: 0.3 MG/DL — SIGNIFICANT CHANGE UP (ref 0.2–1.2)
BUN SERPL-MCNC: 25 MG/DL — HIGH (ref 7–23)
CALCIUM SERPL-MCNC: 8 MG/DL — LOW (ref 8.5–10.1)
CHLORIDE SERPL-SCNC: 114 MMOL/L — HIGH (ref 96–108)
CO2 SERPL-SCNC: 17 MMOL/L — LOW (ref 22–31)
CREAT SERPL-MCNC: 1.94 MG/DL — HIGH (ref 0.5–1.3)
CRP SERPL-MCNC: 19.52 MG/DL — HIGH (ref 0–0.4)
CULTURE RESULTS: SIGNIFICANT CHANGE UP
EOSINOPHIL # BLD AUTO: 0 K/UL — SIGNIFICANT CHANGE UP (ref 0–0.5)
EOSINOPHIL NFR BLD AUTO: 0 % — SIGNIFICANT CHANGE UP (ref 0–6)
GLUCOSE SERPL-MCNC: 98 MG/DL — SIGNIFICANT CHANGE UP (ref 70–99)
HCT VFR BLD CALC: 26.8 % — LOW (ref 39–50)
HCV RNA FLD QL NAA+PROBE: SIGNIFICANT CHANGE UP
HCV RNA SPEC QL PROBE+SIG AMP: SIGNIFICANT CHANGE UP
HGB BLD-MCNC: 7.8 G/DL — LOW (ref 13–17)
LIDOCAIN IGE QN: 1067 U/L — HIGH (ref 73–393)
LYMPHOCYTES # BLD AUTO: 0.55 K/UL — LOW (ref 1–3.3)
LYMPHOCYTES # BLD AUTO: 3 % — LOW (ref 13–44)
M PROTEIN 24H UR ELPH-MRATE: SIGNIFICANT CHANGE UP
MAGNESIUM SERPL-MCNC: 2.1 MG/DL — SIGNIFICANT CHANGE UP (ref 1.6–2.6)
MCHC RBC-ENTMCNC: 24.2 PG — LOW (ref 27–34)
MCHC RBC-ENTMCNC: 29.1 GM/DL — LOW (ref 32–36)
MCV RBC AUTO: 83.2 FL — SIGNIFICANT CHANGE UP (ref 80–100)
METHOD TYPE: SIGNIFICANT CHANGE UP
MONOCYTES # BLD AUTO: 0.92 K/UL — HIGH (ref 0–0.9)
MONOCYTES NFR BLD AUTO: 5 % — SIGNIFICANT CHANGE UP (ref 2–14)
NEUTROPHILS # BLD AUTO: 16.35 K/UL — HIGH (ref 1.8–7.4)
NEUTROPHILS NFR BLD AUTO: 89 % — HIGH (ref 43–77)
NRBC # BLD: SIGNIFICANT CHANGE UP /100 WBCS (ref 0–0)
ORGANISM # SPEC MICROSCOPIC CNT: SIGNIFICANT CHANGE UP
ORGANISM # SPEC MICROSCOPIC CNT: SIGNIFICANT CHANGE UP
PCP SPEC-MCNC: SIGNIFICANT CHANGE UP
PHOSPHATE SERPL-MCNC: 3.5 MG/DL — SIGNIFICANT CHANGE UP (ref 2.5–4.5)
PLATELET # BLD AUTO: 295 K/UL — SIGNIFICANT CHANGE UP (ref 150–400)
POTASSIUM SERPL-MCNC: 4.8 MMOL/L — SIGNIFICANT CHANGE UP (ref 3.5–5.3)
POTASSIUM SERPL-SCNC: 4.8 MMOL/L — SIGNIFICANT CHANGE UP (ref 3.5–5.3)
PROCALCITONIN SERPL-MCNC: 0.26 NG/ML — HIGH (ref 0.02–0.1)
PROT SERPL-MCNC: 5.8 G/DL — LOW (ref 6–8.3)
PROT SERPL-MCNC: 5.8 G/DL — LOW (ref 6–8.3)
PROT SERPL-MCNC: 6.1 GM/DL — SIGNIFICANT CHANGE UP (ref 6–8.3)
RBC # BLD: 3.22 M/UL — LOW (ref 4.2–5.8)
RBC # FLD: 21.2 % — HIGH (ref 10.3–14.5)
SODIUM SERPL-SCNC: 141 MMOL/L — SIGNIFICANT CHANGE UP (ref 135–145)
SPECIMEN SOURCE: SIGNIFICANT CHANGE UP
URATE SERPL-MCNC: 7.4 MG/DL — SIGNIFICANT CHANGE UP (ref 3.4–8.8)
WBC # BLD: 18.37 K/UL — HIGH (ref 3.8–10.5)
WBC # FLD AUTO: 18.37 K/UL — HIGH (ref 3.8–10.5)

## 2019-03-25 PROCEDURE — 99233 SBSQ HOSP IP/OBS HIGH 50: CPT

## 2019-03-25 RX ORDER — SODIUM CHLORIDE 9 MG/ML
1000 INJECTION INTRAMUSCULAR; INTRAVENOUS; SUBCUTANEOUS
Qty: 0 | Refills: 0 | Status: DISCONTINUED | OUTPATIENT
Start: 2019-03-25 | End: 2019-03-29

## 2019-03-25 RX ORDER — LIPASE/PROTEASE/AMYLASE 16-48-48K
2 CAPSULE,DELAYED RELEASE (ENTERIC COATED) ORAL
Qty: 0 | Refills: 0 | Status: DISCONTINUED | OUTPATIENT
Start: 2019-03-25 | End: 2019-03-29

## 2019-03-25 RX ADMIN — Medication 1 MILLIGRAM(S): at 12:02

## 2019-03-25 RX ADMIN — Medication 2 CAPSULE(S): at 17:17

## 2019-03-25 RX ADMIN — MORPHINE SULFATE 2 MILLIGRAM(S): 50 CAPSULE, EXTENDED RELEASE ORAL at 21:03

## 2019-03-25 RX ADMIN — MORPHINE SULFATE 2 MILLIGRAM(S): 50 CAPSULE, EXTENDED RELEASE ORAL at 10:16

## 2019-03-25 RX ADMIN — OXYCODONE AND ACETAMINOPHEN 1 TABLET(S): 5; 325 TABLET ORAL at 05:09

## 2019-03-25 RX ADMIN — MORPHINE SULFATE 15 MILLIGRAM(S): 50 CAPSULE, EXTENDED RELEASE ORAL at 13:21

## 2019-03-25 RX ADMIN — Medication 3 MILLILITER(S): at 13:42

## 2019-03-25 RX ADMIN — MORPHINE SULFATE 2 MILLIGRAM(S): 50 CAPSULE, EXTENDED RELEASE ORAL at 08:40

## 2019-03-25 RX ADMIN — MORPHINE SULFATE 15 MILLIGRAM(S): 50 CAPSULE, EXTENDED RELEASE ORAL at 00:50

## 2019-03-25 RX ADMIN — Medication 1 PATCH: at 08:40

## 2019-03-25 RX ADMIN — Medication 1 PATCH: at 20:08

## 2019-03-25 RX ADMIN — OXYCODONE AND ACETAMINOPHEN 1 TABLET(S): 5; 325 TABLET ORAL at 11:29

## 2019-03-25 RX ADMIN — MORPHINE SULFATE 15 MILLIGRAM(S): 50 CAPSULE, EXTENDED RELEASE ORAL at 19:03

## 2019-03-25 RX ADMIN — AMLODIPINE BESYLATE 10 MILLIGRAM(S): 2.5 TABLET ORAL at 05:50

## 2019-03-25 RX ADMIN — Medication 1 TABLET(S): at 12:02

## 2019-03-25 RX ADMIN — Medication 2 CAPSULE(S): at 12:02

## 2019-03-25 RX ADMIN — MORPHINE SULFATE 15 MILLIGRAM(S): 50 CAPSULE, EXTENDED RELEASE ORAL at 17:17

## 2019-03-25 RX ADMIN — Medication 0.6 MILLIGRAM(S): at 14:49

## 2019-03-25 RX ADMIN — OXYCODONE AND ACETAMINOPHEN 1 TABLET(S): 5; 325 TABLET ORAL at 04:09

## 2019-03-25 RX ADMIN — MORPHINE SULFATE 2 MILLIGRAM(S): 50 CAPSULE, EXTENDED RELEASE ORAL at 01:49

## 2019-03-25 RX ADMIN — MORPHINE SULFATE 2 MILLIGRAM(S): 50 CAPSULE, EXTENDED RELEASE ORAL at 21:20

## 2019-03-25 RX ADMIN — MORPHINE SULFATE 2 MILLIGRAM(S): 50 CAPSULE, EXTENDED RELEASE ORAL at 14:49

## 2019-03-25 RX ADMIN — Medication 0.6 MILLIGRAM(S): at 05:50

## 2019-03-25 RX ADMIN — SODIUM CHLORIDE 100 MILLILITER(S): 9 INJECTION INTRAMUSCULAR; INTRAVENOUS; SUBCUTANEOUS at 21:03

## 2019-03-25 RX ADMIN — Medication 1 PACKET(S): at 05:50

## 2019-03-25 RX ADMIN — MORPHINE SULFATE 2 MILLIGRAM(S): 50 CAPSULE, EXTENDED RELEASE ORAL at 01:19

## 2019-03-25 RX ADMIN — Medication 3 MILLILITER(S): at 22:02

## 2019-03-25 RX ADMIN — Medication 100 MILLIGRAM(S): at 12:02

## 2019-03-25 RX ADMIN — MORPHINE SULFATE 15 MILLIGRAM(S): 50 CAPSULE, EXTENDED RELEASE ORAL at 12:02

## 2019-03-25 RX ADMIN — SODIUM CHLORIDE 100 MILLILITER(S): 9 INJECTION INTRAMUSCULAR; INTRAVENOUS; SUBCUTANEOUS at 10:45

## 2019-03-25 RX ADMIN — Medication 1 PATCH: at 12:02

## 2019-03-25 RX ADMIN — MORPHINE SULFATE 2 MILLIGRAM(S): 50 CAPSULE, EXTENDED RELEASE ORAL at 17:14

## 2019-03-25 RX ADMIN — OXYCODONE AND ACETAMINOPHEN 1 TABLET(S): 5; 325 TABLET ORAL at 10:16

## 2019-03-25 NOTE — PROGRESS NOTE ADULT - SUBJECTIVE AND OBJECTIVE BOX
Gastroenterology  Patient seen and examined bedside resting comfortably.  No complaints offered.   + abdominal pain improved   Denies nausea and vomiting. Tolerating diet.  Normal flatus/BM.     T(F): 97.7 (03-25-19 @ 05:40), Max: 97.7 (03-25-19 @ 05:40)  HR: 98 (03-25-19 @ 05:40) (87 - 98)  BP: 158/88 (03-25-19 @ 05:40) (140/79 - 158/88)  RR: 18 (03-25-19 @ 05:40) (18 - 18)  SpO2: 91% (03-25-19 @ 05:40) (87% - 93%)  Wt(kg): --  CAPILLARY BLOOD GLUCOSE          PHYSICAL EXAM:  General: NAD, WDWN.   Neuro:  Alert & responsive  HEENT: NCAT, EOMI, conjunctiva clear  CV: +S1+S2 regular rate and rhythm  Lung: clear to ausculation bilaterally, respirations nonlabored, good inspiratory effort  Abdomen: soft, Non Tender, No distention Normal active BS  Extremities: no cyanosis, clubbing or edema    LABS:                        7.8    18.37 )-----------( 295      ( 25 Mar 2019 06:26 )             26.8     03-25    141  |  114<H>  |  25<H>  ----------------------------<  98  4.8   |  17<L>  |  1.94<H>    Ca    8.0<L>      25 Mar 2019 06:26  Phos  3.5     03-25  Mg     2.1     03-25    TPro  6.1  /  Alb  2.2<L>  /  TBili  0.3  /  DBili  x   /  AST  22  /  ALT  14  /  AlkPhos  107  03-25    LIVER FUNCTIONS - ( 25 Mar 2019 06:26 )  Alb: 2.2 g/dL / Pro: 6.1 gm/dL / ALK PHOS: 107 U/L / ALT: 14 U/L / AST: 22 U/L / GGT: x             I&O's Detail    24 Mar 2019 07:01  -  25 Mar 2019 07:00  --------------------------------------------------------  IN:    sodium chloride 0.9%: 1200 mL  Total IN: 1200 mL    OUT:  Total OUT: 0 mL    Total NET: 1200 mL        03-25 @ 06:26    141 | 114 | 25  /8.0 | 2.1 | 3.5  _______________________/  4.8 | 17 | 1.94                           \par   Lipase, Serum: 1067 U/L (03-25 @ 06:26)

## 2019-03-25 NOTE — PROGRESS NOTE ADULT - SUBJECTIVE AND OBJECTIVE BOX
Patient feels well no new complaints today. mild hiccups'       MEDICATIONS  (STANDING):  ALBUTerol/ipratropium for Nebulization 3 milliLiter(s) Nebulizer every 8 hours  amLODIPine   Tablet 10 milliGRAM(s) Oral daily  buDESOnide 160 MICROgram(s)/formoterol 4.5 MICROgram(s) Inhaler 2 Puff(s) Inhalation two times a day  folic acid 1 milliGRAM(s) Oral daily  morphine ER Tablet 15 milliGRAM(s) Oral every 12 hours  multivitamin/minerals 1 Tablet(s) Oral daily  nicotine -  14 mG/24Hr(s) Patch 1 patch Transdermal daily  pancrelipase  (CREON 36,000 Lipase Units) 2 Capsule(s) Oral three times a day with meals  sodium chloride 0.9%. 1000 milliLiter(s) (100 mL/Hr) IV Continuous <Continuous>  thiamine 100 milliGRAM(s) Oral daily    MEDICATIONS  (PRN):  LORazepam     Tablet 2 milliGRAM(s) Oral every 6 hours PRN Anxiety  morphine  - Injectable 2 milliGRAM(s) IV Push every 6 hours PRN Severe Pain (7 - 10)  ondansetron Injectable 8 milliGRAM(s) IV Push every 8 hours PRN Nausea and/or Vomiting  oxyCODONE    5 mG/acetaminophen 325 mG 1 Tablet(s) Oral every 6 hours PRN Moderate Pain (4 - 6)      03-24-19 @ 07:01  -  03-25-19 @ 07:00  --------------------------------------------------------  IN: 1200 mL / OUT: 0 mL / NET: 1200 mL      PHYSICAL EXAM:      T(C): 36.4 (03-25-19 @ 11:19), Max: 36.5 (03-25-19 @ 05:40)  HR: 88 (03-25-19 @ 11:19) (88 - 98)  BP: 141/74 (03-25-19 @ 11:19) (140/79 - 158/88)  RR: 18 (03-25-19 @ 11:19) (18 - 18)  SpO2: 93% (03-25-19 @ 11:19) (90% - 93%)  Wt(kg): --  Respiratory: clear anteriorly, decreased BS at bases  Cardiovascular: S1 S2  Gastrointestinal: soft NT ND +BS  Extremities:   1 edema                                    7.8    18.37 )-----------( 295      ( 25 Mar 2019 06:26 )             26.8     03-25    141  |  114<H>  |  25<H>  ----------------------------<  98  4.8   |  17<L>  |  1.94<H>    Ca    8.0<L>      25 Mar 2019 06:26  Phos  3.5     03-25  Mg     2.1     03-25    TPro  6.1  /  Alb  2.2<L>  /  TBili  0.3  /  DBili  x   /  AST  22  /  ALT  14  /  AlkPhos  107  03-25      LIVER FUNCTIONS - ( 25 Mar 2019 06:26 )  Alb: 2.2 g/dL / Pro: 6.1 gm/dL / ALK PHOS: 107 U/L / ALT: 14 U/L / AST: 22 U/L / GGT: x             Assessment and Plan:    YUNIEL, hypokalemia; acute pancreatitis;  degree of CKD unclear; pre renal azotemia;  NSAIDs AIN;   Overall clinically improving; continue supportive care.

## 2019-03-25 NOTE — PROGRESS NOTE ADULT - ASSESSMENT
58 year old man with no PMH (per him), former ETOH abuser presents to ED with complaint of abdominal pain, Back pain & n/v and was found to have   Acute on chronic pancreatitis, YUNIEL & hypokalemia.    Acute on chronic pancreatitis  - CT showed numerous calcifications in the pancreatic head and pancreatic body w/ dilatation of the pancreatic duct within the pancreatic body and tail c/w  chronic pancreatitis  - lipase improving  - c/w IVF   - advanced to fulls, will advance to solids tomorrow if he tolerates diet  - Analgesia PRN  - triglyceride WNL   - abd US showed a fatty liver w/ a dilatated main pancreatic duct and coarse calcifications within the pancreatic head compatible w/ chronic   pancreatitis  - as per GI, will consider MRCP if lipase continues to go up    Leukocytosis  - ID on board    R knee gout   - started on colchacine & watch CrCl  - procal 0.46  - UA negative  - cxs pending     Hypokalemia & Hypophosphatemia  - resolved    Anemia due to poor intake   - Hgb was 6 on admission  - post 2 units pRBC  - Retic Count, B12 and Folate are WNL  - iron panel showed inflammatory anemia     ETOH use history  - c/w thiamine and add folic acid and MVN  - watch for withdrawal     YUNIEL   - suspect underlying CKD, but no baseline is available   - c/w IVF  - nephrology consulted    HTN  - c/w Norvasc     HCV  - patient says he's aware that he has Hep C and was being treated as outpatient   - hep C RNA pending    Prophylaxis:   DVT: SCD  GI: PO diet     Pt cannot leave AMA as per psych.

## 2019-03-25 NOTE — PROGRESS NOTE ADULT - SUBJECTIVE AND OBJECTIVE BOX
HPI:  58 year old man with no PMH (per him), former ETOH abuser presents to ED with complaint of abdominal pain with Back pain.  He is AAO x 3 but does not seem to be a reliable historian regarding PMH.  He does not know any medications (says "they all don't work, whatever they are, I don't take them").  In addition to his abdominal pain and Back pain he complains of nausea and vomiting.  He offers no other complaints.  In the ED, pt's h/h 6/21.7, BMP consistent with renal failure without baseline, K 2.4, supplemented IV in ED.  EKG NSR.  Lipase 1404, BNP 3818.  CT imaging consistent with chronic pancreatitis. (19 Mar 2019 21:52)  ongoing hiccups   says had them x 5 years ; got a break for 11/2 years   now again     Allergies    No Known Allergies    Intolerances        MEDICATIONS  (STANDING):  ALBUTerol/ipratropium for Nebulization 3 milliLiter(s) Nebulizer every 8 hours  amLODIPine   Tablet 10 milliGRAM(s) Oral daily  buDESOnide 160 MICROgram(s)/formoterol 4.5 MICROgram(s) Inhaler 2 Puff(s) Inhalation two times a day  folic acid 1 milliGRAM(s) Oral daily  morphine ER Tablet 15 milliGRAM(s) Oral every 12 hours  multivitamin/minerals 1 Tablet(s) Oral daily  nicotine -  14 mG/24Hr(s) Patch 1 patch Transdermal daily  pancrelipase  (CREON 36,000 Lipase Units) 2 Capsule(s) Oral three times a day with meals  sodium chloride 0.9%. 1000 milliLiter(s) (100 mL/Hr) IV Continuous <Continuous>  thiamine 100 milliGRAM(s) Oral daily    MEDICATIONS  (PRN):  LORazepam     Tablet 2 milliGRAM(s) Oral every 6 hours PRN Anxiety  morphine  - Injectable 2 milliGRAM(s) IV Push every 6 hours PRN Severe Pain (7 - 10)  ondansetron Injectable 8 milliGRAM(s) IV Push every 8 hours PRN Nausea and/or Vomiting  oxyCODONE    5 mG/acetaminophen 325 mG 1 Tablet(s) Oral every 6 hours PRN Moderate Pain (4 - 6)      REVIEW OF SYSTEMS:    CONSTITUTIONAL: No fever, chills,  has  weight loss,fatigue  HEENT: No sore throat, runny nose, ear ache  RESPIRATORY: No cough, wheezing, No shortness of breath  CARDIOVASCULAR: No chest pain, palpitations, dizziness  GASTROINTESTINAL: No abdominal pain. No nausea, vomiting, diarrhea  GENITOURINARY: No dysuria, increase frequency, hematuria, or incontinence  NEUROLOGICAL: No headaches, memory loss, loss of strength, numbness, or tremors, no weakness  EXTREMITY: No pedal edema BLE  SKIN: No itching, burning, rashes, or lesions     VITAL SIGNS:  T(C): 36.2 (03-25-19 @ 17:35), Max: 36.5 (03-25-19 @ 05:40)  T(F): 97.1 (03-25-19 @ 17:35), Max: 97.7 (03-25-19 @ 05:40)  HR: 89 (03-25-19 @ 17:35) (84 - 98)  BP: 135/73 (03-25-19 @ 17:35) (135/73 - 158/88)  RR: 18 (03-25-19 @ 17:35) (18 - 18)  SpO2: 92% (03-25-19 @ 17:35) (91% - 96%)  Wt(kg): --    PHYSICAL EXAM:    GENERAL: not in any distress  HEENT: Neck is supple, normocephalic, atraumatic   CHEST/LUNG: Clear to auscultation bilaterally; No rales, rhonchi, wheezing  HEART: Regular rate and rhythm; No murmurs, rubs, or gallops  ABDOMEN: Soft, Nontender, Nondistended; Bowel sounds present, no rebound   EXTREMITIES:  2+ Peripheral Pulses, No clubbing, cyanosis, or edema  GENITOURINARY: NEGATIVE   SKIN: No rashes or lesions  BACK: no pressor sore   NERVOUS SYSTEM:  Alert & Oriented X3,   LABS:                         7.8    18.37 )-----------( 295      ( 25 Mar 2019 06:26 )             26.8     03-25    141  |  114<H>  |  25<H>  ----------------------------<  98  4.8   |  17<L>  |  1.94<H>    Ca    8.0<L>      25 Mar 2019 06:26  Phos  3.5     03-25  Mg     2.1     03-25    TPro  6.1  /  Alb  2.2<L>  /  TBili  0.3  /  DBili  x   /  AST  22  /  ALT  14  /  AlkPhos  107  03-25    LIVER FUNCTIONS - ( 25 Mar 2019 06:26 )  Alb: 2.2 g/dL / Pro: 6.1 gm/dL / ALK PHOS: 107 U/L / ALT: 14 U/L / AST: 22 U/L / GGT: x                                   Culture Results:   >100,000 CFU/ml Gram positive organisms (03-23 @ 21:39)  Culture Results:   <10,000 CFU/mL Normal Urogenital Mariah (03-20 @ 10:40)                Radiology:  < from: CT Abdomen and Pelvis w/ Oral Cont (03.19.19 @ 19:21) >  MPRESSION:     Numerous calcifications in the pancreatic head and pancreatic body.   Dilatation of the pancreatic duct within the pancreatic body and tail.   Findings likely representing chronic pancreatitis. Limited evaluation for   focal mass secondary to lack of IV contrast. If concern for focal mass,   repeat CT or MRI with and without contrast can be obtained.  No definite   CT findings of acute pancreatitis.                LACEY ELLIS M.D., ATTENDING RADIOLOGIST    < end of copied text > HPI:  58 year old man with no PMH (per him), former ETOH abuser presents to ED with complaint of abdominal pain with Back pain.  He is AAO x 3 but does not seem to be a reliable historian regarding PMH.  He does not know any medications (says "they all don't work, whatever they are, I don't take them").  In addition to his abdominal pain and Back pain he complains of nausea and vomiting.  He offers no other complaints.  In the ED, pt's h/h 6/21.7, BMP consistent with renal failure without baseline, K 2.4, supplemented IV in ED.  EKG NSR.  Lipase 1404, BNP 3818.  CT imaging consistent with chronic pancreatitis. (19 Mar 2019 21:52)  ongoing hiccups   says had them x 5 years ; got a break for 11/2 years   now again non stop    Allergies    No Known Allergies    Intolerances        MEDICATIONS  (STANDING):  ALBUTerol/ipratropium for Nebulization 3 milliLiter(s) Nebulizer every 8 hours  amLODIPine   Tablet 10 milliGRAM(s) Oral daily  buDESOnide 160 MICROgram(s)/formoterol 4.5 MICROgram(s) Inhaler 2 Puff(s) Inhalation two times a day  folic acid 1 milliGRAM(s) Oral daily  morphine ER Tablet 15 milliGRAM(s) Oral every 12 hours  multivitamin/minerals 1 Tablet(s) Oral daily  nicotine -  14 mG/24Hr(s) Patch 1 patch Transdermal daily  pancrelipase  (CREON 36,000 Lipase Units) 2 Capsule(s) Oral three times a day with meals  sodium chloride 0.9%. 1000 milliLiter(s) (100 mL/Hr) IV Continuous <Continuous>  thiamine 100 milliGRAM(s) Oral daily    MEDICATIONS  (PRN):  LORazepam     Tablet 2 milliGRAM(s) Oral every 6 hours PRN Anxiety  morphine  - Injectable 2 milliGRAM(s) IV Push every 6 hours PRN Severe Pain (7 - 10)  ondansetron Injectable 8 milliGRAM(s) IV Push every 8 hours PRN Nausea and/or Vomiting  oxyCODONE    5 mG/acetaminophen 325 mG 1 Tablet(s) Oral every 6 hours PRN Moderate Pain (4 - 6)      REVIEW OF SYSTEMS:    CONSTITUTIONAL: No fever, chills,  has  weight loss,fatigue  HEENT: No sore throat, runny nose, ear ache  RESPIRATORY: No cough, wheezing, No shortness of breath  CARDIOVASCULAR: No chest pain, palpitations, dizziness  GASTROINTESTINAL: No abdominal pain. No nausea, vomiting, diarrhea  GENITOURINARY: No dysuria, increase frequency, hematuria, or incontinence  NEUROLOGICAL: No headaches, memory loss, loss of strength, numbness, or tremors, no weakness  EXTREMITY: No pedal edema BLE  SKIN: No itching, burning, rashes, or lesions   hiccups   VITAL SIGNS:  T(C): 36.2 (03-25-19 @ 17:35), Max: 36.5 (03-25-19 @ 05:40)  T(F): 97.1 (03-25-19 @ 17:35), Max: 97.7 (03-25-19 @ 05:40)  HR: 89 (03-25-19 @ 17:35) (84 - 98)  BP: 135/73 (03-25-19 @ 17:35) (135/73 - 158/88)  RR: 18 (03-25-19 @ 17:35) (18 - 18)  SpO2: 92% (03-25-19 @ 17:35) (91% - 96%)  Wt(kg): --    PHYSICAL EXAM:    GENERAL: not in any distress  unkempt hiccups   HEENT: Neck is supple, normocephalic, atraumatic   CHEST/LUNG: Clear to auscultation bilaterally; No rales, rhonchi, wheezing  HEART: Regular rate and rhythm; No murmurs, rubs, or gallops  ABDOMEN: Soft, Nontender, Nondistended; Bowel sounds present, no rebound   EXTREMITIES:  2+ Peripheral Pulses, No clubbing, cyanosis, or edema  GENITOURINARY: NEGATIVE   SKIN: No rashes or lesions  BACK: no pressor sore   NERVOUS SYSTEM:  Alert & Oriented X3,   LABS:                         7.8    18.37 )-----------( 295      ( 25 Mar 2019 06:26 )             26.8     03-25    141  |  114<H>  |  25<H>  ----------------------------<  98  4.8   |  17<L>  |  1.94<H>    Ca    8.0<L>      25 Mar 2019 06:26  Phos  3.5     03-25  Mg     2.1     03-25    TPro  6.1  /  Alb  2.2<L>  /  TBili  0.3  /  DBili  x   /  AST  22  /  ALT  14  /  AlkPhos  107  03-25    LIVER FUNCTIONS - ( 25 Mar 2019 06:26 )  Alb: 2.2 g/dL / Pro: 6.1 gm/dL / ALK PHOS: 107 U/L / ALT: 14 U/L / AST: 22 U/L / GGT: x                                   Culture Results:   >100,000 CFU/ml Gram positive organisms (03-23 @ 21:39)  Culture Results:   <10,000 CFU/mL Normal Urogenital Mariah (03-20 @ 10:40)                Radiology:  < from: CT Abdomen and Pelvis w/ Oral Cont (03.19.19 @ 19:21) >  MPRESSION:     Numerous calcifications in the pancreatic head and pancreatic body.   Dilatation of the pancreatic duct within the pancreatic body and tail.   Findings likely representing chronic pancreatitis. Limited evaluation for   focal mass secondary to lack of IV contrast. If concern for focal mass,   repeat CT or MRI with and without contrast can be obtained.  No definite   CT findings of acute pancreatitis.                LACEY ELLIS M.D., ATTENDING RADIOLOGIST    < end of copied text >

## 2019-03-25 NOTE — PROGRESS NOTE ADULT - PROBLEM SELECTOR PLAN 1
Hydration per renal  Follow Serum amylase and lipase  Lipid panel  Abdominal sonogram  will advance to DASH  diet will need Creon 20156 units TID with meals.

## 2019-03-25 NOTE — CHART NOTE - NSCHARTNOTEFT_GEN_A_CORE
Assessment: Pt seen for chronic malnutrition follow up. Pt admitted with ETOH induced acute pancreatitis, acute renal failure, anemia and hepatitis C. Pt with some nausea and stated with some episodes of emesis, but nothing substantial. Pt denied any diarrhea or constipation. Pt reported poor PO intake. Pt with confusion at times.     Factors impacting intake: [ ] none [ ] nausea  [ ] vomiting [ ] diarrhea [ ] constipation  [ ]chewing problems [ ] swallowing issues  [x ] other: altered GI status- pancreatitis     Diet Prescription: Diet, DASH/TLC:   Sodium & Cholesterol Restricted (03-25-19 @ 10:28)    Intake: ~50%    Current Weight: Weight (kg): 57.5 (03-19 @ 23:04); 66.4 kg (03-25)  % Weight Change: 15% gain (19.2#) x 6days; ?questionable weights     Physical appearance: BMI: 19.2; no edema noted     Pertinent Medications: MEDICATIONS  (STANDING):  ALBUTerol/ipratropium for Nebulization 3 milliLiter(s) Nebulizer every 8 hours  amLODIPine   Tablet 10 milliGRAM(s) Oral daily  buDESOnide 160 MICROgram(s)/formoterol 4.5 MICROgram(s) Inhaler 2 Puff(s) Inhalation two times a day  colchicine 0.6 milliGRAM(s) Oral three times a day  folic acid 1 milliGRAM(s) Oral daily  morphine ER Tablet 15 milliGRAM(s) Oral every 12 hours  multivitamin/minerals 1 Tablet(s) Oral daily  nicotine -  14 mG/24Hr(s) Patch 1 patch Transdermal daily  pancrelipase  (CREON 36,000 Lipase Units) 2 Capsule(s) Oral three times a day with meals  sodium chloride 0.9%. 1000 milliLiter(s) (100 mL/Hr) IV Continuous <Continuous>  thiamine 100 milliGRAM(s) Oral daily    MEDICATIONS  (PRN):  LORazepam     Tablet 2 milliGRAM(s) Oral every 6 hours PRN Anxiety  morphine  - Injectable 2 milliGRAM(s) IV Push every 6 hours PRN Severe Pain (7 - 10)  ondansetron Injectable 8 milliGRAM(s) IV Push every 8 hours PRN Nausea and/or Vomiting  oxyCODONE    5 mG/acetaminophen 325 mG 1 Tablet(s) Oral every 6 hours PRN Moderate Pain (4 - 6)    Pertinent Labs: 03-25 Na 141 mmol/L Glu 98 mg/dL K+ 4.8 mmol/L Cr 1.94 mg/dL<H> BUN 25 mg/dL<H> Phos 3.5 mg/dL Alb 2.2 g/dL<L> PAB n/a   Hgb 7.8 g/dL<L> Hct 26.8 %<L> HgA1C n/a    Glucose, Serum: 98 mg/dL   24Hr FS:  Skin: WDL    Estimated Needs:   [ x ] no change since previous assessment (03-20)  [ ] recalculated:     Previous Nutrition Diagnosis:   [ ] Inadequate Energy Intake [ ]Inadequate Oral Intake [ ] Excessive Energy Intake   [ ] Underweight [ ] Increased Nutrient Needs [ ] Overweight/Obesity   [ ] Altered GI Function [ ] Unintended Weight Loss [ ] Food & Nutrition Related Knowledge Deficit [ x ] severe chronic Malnutrition [ ] moderate malnutrition    Nutrition Diagnosis is [ x ] ongoing  [ ] resolved  [ ] improved  [ ] not applicable   Previous Goal: Pt to consume >75% meals and supplements    New Nutrition Diagnosis: [x  ] not applicable       Interventions:   Recommend  [ x ] Continue: current diet Rx  [  ] Change Diet To:   [ x ] Nutrition Supplement: Ensure Pudding x 1/day (provides 170 kcal, 4 g protein)   [ ] Nutrition Support:  [ ] Other:     Monitoring and Evaluation:   [X ] PO intake [ x ] Tolerance to diet prescription [ x ] weights [ x ] labs[ x ] follow up per protocol  [ ] other:

## 2019-03-25 NOTE — PROGRESS NOTE ADULT - ASSESSMENT
leucocytosis ?? gout   knee is better today   hiccups is what is bothering him   continue current treatment for now   NEGATIVE ua  no urinary signs and symptoms   urine e fecalis   need to treat ??

## 2019-03-25 NOTE — PROGRESS NOTE ADULT - SUBJECTIVE AND OBJECTIVE BOX
Patient is a 58y old  Male who presents with a chief complaint of pancreatitis, anemia, abnormal renal fxn (25 Mar 2019 10:24)      OVERNIGHT EVENTS: none    REVIEW OF SYSTEMS: denies chest pain/SOB, diaphoresis, no F/C, cough, dizziness, headache, blurry vision, nausea, vomiting, abdominal pain. All others review of systems negative     MEDICATIONS  (STANDING):  ALBUTerol/ipratropium for Nebulization 3 milliLiter(s) Nebulizer every 8 hours  amLODIPine   Tablet 10 milliGRAM(s) Oral daily  buDESOnide 160 MICROgram(s)/formoterol 4.5 MICROgram(s) Inhaler 2 Puff(s) Inhalation two times a day  folic acid 1 milliGRAM(s) Oral daily  morphine ER Tablet 15 milliGRAM(s) Oral every 12 hours  multivitamin/minerals 1 Tablet(s) Oral daily  nicotine -  14 mG/24Hr(s) Patch 1 patch Transdermal daily  pancrelipase  (CREON 36,000 Lipase Units) 2 Capsule(s) Oral three times a day with meals  sodium chloride 0.9%. 1000 milliLiter(s) (100 mL/Hr) IV Continuous <Continuous>  thiamine 100 milliGRAM(s) Oral daily    MEDICATIONS  (PRN):  LORazepam     Tablet 2 milliGRAM(s) Oral every 6 hours PRN Anxiety  morphine  - Injectable 2 milliGRAM(s) IV Push every 6 hours PRN Severe Pain (7 - 10)  ondansetron Injectable 8 milliGRAM(s) IV Push every 8 hours PRN Nausea and/or Vomiting  oxyCODONE    5 mG/acetaminophen 325 mG 1 Tablet(s) Oral every 6 hours PRN Moderate Pain (4 - 6)      Allergies    No Known Allergies    Intolerances        T(F): 97.6 (03-25-19 @ 11:19), Max: 97.7 (03-25-19 @ 05:40)  HR: 88 (03-25-19 @ 11:19) (88 - 98)  BP: 141/74 (03-25-19 @ 11:19) (140/79 - 158/88)  RR: 18 (03-25-19 @ 11:19) (18 - 18)  SpO2: 93% (03-25-19 @ 11:19) (90% - 93%)  Wt(kg): --    PHYSICAL EXAM:  GENERAL: NAD, well-groomed, well-developed  HEAD:  Atraumatic, Normocephalic  EYES: EOMI, PERRLA   NECK: Supple   NERVOUS SYSTEM: Alert, no tremors  CHEST/LUNG: Clear to auscultation bilaterally; No rales, rhonchi, wheezing, or rubs  HEART: Regular rate and rhythm; No murmurs, rubs, or gallops  ABDOMEN: Soft, Nontender, Nondistended; Bowel sounds present  EXTREMITIES: R knee mildly warm and inflamed       LABS:                        7.8    18.37 )-----------( 295      ( 25 Mar 2019 06:26 )             26.8     03-25    141  |  114<H>  |  25<H>  ----------------------------<  98  4.8   |  17<L>  |  1.94<H>    Ca    8.0<L>      25 Mar 2019 06:26  Phos  3.5     03-25  Mg     2.1     03-25    TPro  6.1  /  Alb  2.2<L>  /  TBili  0.3  /  DBili  x   /  AST  22  /  ALT  14  /  AlkPhos  107  03-25        Culture - Urine (collected 23 Mar 2019 21:39)  Source: .Urine  Preliminary Report (24 Mar 2019 17:12):    >100,000 CFU/ml Gram positive organisms    Culture - Urine (collected 20 Mar 2019 10:40)  Source: .Urine  Final Report (21 Mar 2019 19:09):    <10,000 CFU/mL Normal Urogenital Mariah      RADIOLOGY & ADDITIONAL TESTS:    Imaging Personally Reviewed:  [x ] YES    < from: TTE Echo Doppler w/o Cont (03.21.19 @ 08:50) >   1. Left ventricular ejection fraction, by visual estimation, is 60 to   65%.   2. Normal global left ventricular systolic function.   3. There ismild concentric left ventricular hypertrophy.   4. Degenerative mitral valve.   5. Mild mitral annular calcification.   6. Mild mitral valve regurgitation.   7. Thickening and calcification of the anterior and posterior mitral   valve leaflets.   8. Mild tricuspid regurgitation.   9. Mild to moderate aortic regurgitation.  10. Trace pulmonic valve regurgitation.  11. The aortic valve mean gradient is 6.3 mmHg consistent with normally   opening aortic valve.    < end of copied text >      Consultant(s) Notes Reviewed:  [x ] YES     Care Discussed with [x ] Consultants [X ] Patient [ ] Family  [x ]    [x ]  Other; RN

## 2019-03-26 DIAGNOSIS — D64.9 ANEMIA, UNSPECIFIED: ICD-10-CM

## 2019-03-26 DIAGNOSIS — F10.10 ALCOHOL ABUSE, UNCOMPLICATED: ICD-10-CM

## 2019-03-26 DIAGNOSIS — R41.0 DISORIENTATION, UNSPECIFIED: ICD-10-CM

## 2019-03-26 LAB
% ALBUMIN: 60.8 % — SIGNIFICANT CHANGE UP
% ALPHA 1: 7.5 % — SIGNIFICANT CHANGE UP
% ALPHA 2: 7.9 % — SIGNIFICANT CHANGE UP
% BETA: 10.9 % — SIGNIFICANT CHANGE UP
% GAMMA: 12.9 % — SIGNIFICANT CHANGE UP
ALBUMIN SERPL ELPH-MCNC: 3.5 G/DL — LOW (ref 3.6–5.5)
ALBUMIN/GLOB SERPL ELPH: 1.5 RATIO — SIGNIFICANT CHANGE UP
ALPHA1 GLOB SERPL ELPH-MCNC: 0.4 G/DL — SIGNIFICANT CHANGE UP (ref 0.1–0.4)
ALPHA2 GLOB SERPL ELPH-MCNC: 0.5 G/DL — SIGNIFICANT CHANGE UP (ref 0.5–1)
ANION GAP SERPL CALC-SCNC: 8 MMOL/L — SIGNIFICANT CHANGE UP (ref 5–17)
B-GLOBULIN SERPL ELPH-MCNC: 0.6 G/DL — SIGNIFICANT CHANGE UP (ref 0.5–1)
BLD GP AB SCN SERPL QL: SIGNIFICANT CHANGE UP
BUN SERPL-MCNC: 19 MG/DL — SIGNIFICANT CHANGE UP (ref 7–23)
CALCIUM SERPL-MCNC: 7.6 MG/DL — LOW (ref 8.5–10.1)
CHLORIDE SERPL-SCNC: 116 MMOL/L — HIGH (ref 96–108)
CO2 SERPL-SCNC: 17 MMOL/L — LOW (ref 22–31)
CREAT SERPL-MCNC: 1.76 MG/DL — HIGH (ref 0.5–1.3)
GAMMA GLOBULIN: 0.7 G/DL — SIGNIFICANT CHANGE UP (ref 0.6–1.6)
GLUCOSE SERPL-MCNC: 100 MG/DL — HIGH (ref 70–99)
HCT VFR BLD CALC: 23.8 % — LOW (ref 39–50)
HGB BLD-MCNC: 7 G/DL — CRITICAL LOW (ref 13–17)
INTERPRETATION SERPL IFE-IMP: SIGNIFICANT CHANGE UP
LIDOCAIN IGE QN: 1007 U/L — HIGH (ref 73–393)
MCHC RBC-ENTMCNC: 24.3 PG — LOW (ref 27–34)
MCHC RBC-ENTMCNC: 29.4 GM/DL — LOW (ref 32–36)
MCV RBC AUTO: 82.6 FL — SIGNIFICANT CHANGE UP (ref 80–100)
NRBC # BLD: 0 /100 WBCS — SIGNIFICANT CHANGE UP (ref 0–0)
OB PNL STL: NEGATIVE — SIGNIFICANT CHANGE UP
PLATELET # BLD AUTO: 335 K/UL — SIGNIFICANT CHANGE UP (ref 150–400)
POTASSIUM SERPL-MCNC: 4 MMOL/L — SIGNIFICANT CHANGE UP (ref 3.5–5.3)
POTASSIUM SERPL-SCNC: 4 MMOL/L — SIGNIFICANT CHANGE UP (ref 3.5–5.3)
PROT PATTERN SERPL ELPH-IMP: SIGNIFICANT CHANGE UP
RBC # BLD: 2.88 M/UL — LOW (ref 4.2–5.8)
RBC # FLD: 21.9 % — HIGH (ref 10.3–14.5)
SODIUM SERPL-SCNC: 141 MMOL/L — SIGNIFICANT CHANGE UP (ref 135–145)
WBC # BLD: 13.69 K/UL — HIGH (ref 3.8–10.5)
WBC # FLD AUTO: 13.69 K/UL — HIGH (ref 3.8–10.5)

## 2019-03-26 PROCEDURE — 99233 SBSQ HOSP IP/OBS HIGH 50: CPT

## 2019-03-26 PROCEDURE — 90792 PSYCH DIAG EVAL W/MED SRVCS: CPT

## 2019-03-26 RX ADMIN — MORPHINE SULFATE 15 MILLIGRAM(S): 50 CAPSULE, EXTENDED RELEASE ORAL at 17:35

## 2019-03-26 RX ADMIN — Medication 1 PATCH: at 12:32

## 2019-03-26 RX ADMIN — Medication 1 TABLET(S): at 12:33

## 2019-03-26 RX ADMIN — MORPHINE SULFATE 2 MILLIGRAM(S): 50 CAPSULE, EXTENDED RELEASE ORAL at 06:00

## 2019-03-26 RX ADMIN — Medication 1 PATCH: at 19:58

## 2019-03-26 RX ADMIN — Medication 2 CAPSULE(S): at 10:52

## 2019-03-26 RX ADMIN — MORPHINE SULFATE 2 MILLIGRAM(S): 50 CAPSULE, EXTENDED RELEASE ORAL at 12:30

## 2019-03-26 RX ADMIN — OXYCODONE AND ACETAMINOPHEN 1 TABLET(S): 5; 325 TABLET ORAL at 15:00

## 2019-03-26 RX ADMIN — OXYCODONE AND ACETAMINOPHEN 1 TABLET(S): 5; 325 TABLET ORAL at 23:33

## 2019-03-26 RX ADMIN — Medication 0.6 MILLIGRAM(S): at 12:33

## 2019-03-26 RX ADMIN — Medication 1 MILLIGRAM(S): at 12:33

## 2019-03-26 RX ADMIN — Medication 100 MILLIGRAM(S): at 12:32

## 2019-03-26 RX ADMIN — MORPHINE SULFATE 15 MILLIGRAM(S): 50 CAPSULE, EXTENDED RELEASE ORAL at 17:36

## 2019-03-26 RX ADMIN — OXYCODONE AND ACETAMINOPHEN 1 TABLET(S): 5; 325 TABLET ORAL at 22:30

## 2019-03-26 RX ADMIN — Medication 1 PATCH: at 12:33

## 2019-03-26 RX ADMIN — MORPHINE SULFATE 2 MILLIGRAM(S): 50 CAPSULE, EXTENDED RELEASE ORAL at 05:43

## 2019-03-26 RX ADMIN — MORPHINE SULFATE 2 MILLIGRAM(S): 50 CAPSULE, EXTENDED RELEASE ORAL at 18:24

## 2019-03-26 RX ADMIN — MORPHINE SULFATE 15 MILLIGRAM(S): 50 CAPSULE, EXTENDED RELEASE ORAL at 05:30

## 2019-03-26 RX ADMIN — MORPHINE SULFATE 15 MILLIGRAM(S): 50 CAPSULE, EXTENDED RELEASE ORAL at 05:11

## 2019-03-26 RX ADMIN — Medication 3 MILLILITER(S): at 21:08

## 2019-03-26 RX ADMIN — MORPHINE SULFATE 2 MILLIGRAM(S): 50 CAPSULE, EXTENDED RELEASE ORAL at 12:45

## 2019-03-26 RX ADMIN — MORPHINE SULFATE 2 MILLIGRAM(S): 50 CAPSULE, EXTENDED RELEASE ORAL at 18:54

## 2019-03-26 RX ADMIN — Medication 3 MILLILITER(S): at 05:36

## 2019-03-26 RX ADMIN — AMLODIPINE BESYLATE 10 MILLIGRAM(S): 2.5 TABLET ORAL at 05:11

## 2019-03-26 RX ADMIN — OXYCODONE AND ACETAMINOPHEN 1 TABLET(S): 5; 325 TABLET ORAL at 16:28
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
## 2019-03-26 NOTE — PROGRESS NOTE ADULT - ASSESSMENT
58 year old man with no PMH (per him), former ETOH abuser presents to ED with complaint of abdominal pain, Back pain & n/v and was found to have   Acute on chronic pancreatitis, YUNIEL & hypokalemia.    Acute on chronic pancreatitis  - CT showed numerous calcifications in the pancreatic head and pancreatic body w/ dilatation of the pancreatic duct within the pancreatic body and tail c/w  chronic pancreatitis  - lipase improving  - c/w IVF   - advanced to fulls, will advance to solids tomorrow if he tolerates diet  - Analgesia PRN  - triglyceride WNL   - abd US showed a fatty liver w/ a dilatated main pancreatic duct and coarse calcifications within the pancreatic head compatible w/ chronic   pancreatitis  - as per GI, will consider MRCP if lipase continues to go up    Leukocytosis  - ID on board    R knee gout   - started on colchacine & watch CrCl  - procal 0.46  - UA negative  - cxs pending     Hypokalemia & Hypophosphatemia  - resolved    Anemia due to poor intake   - Hgb was 6 on admission  - post 2 units pRBC, give 2 more units, send FOBT  - Retic Count, B12 and Folate are WNL  - iron panel showed inflammatory anemia     ETOH use history  - c/w thiamine and add folic acid and MVN  - watch for withdrawal     YUNIEL   - suspect underlying CKD, but no baseline is available   - c/w IVF  - nephrology consulted    HTN  - c/w Norvasc     HCV  - patient says he's aware that he has Hep C and was being treated as outpatient   - hep C RNA pending    Prophylaxis:   DVT: SCD  GI: PO diet     Pt cannot leave AMA as per psych.
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
## 2019-03-26 NOTE — PROGRESS NOTE ADULT - SUBJECTIVE AND OBJECTIVE BOX
Patient is a 58y old  Male who presents with a chief complaint of pancreatitis, anemia, abnormal renal fxn (26 Mar 2019 14:11)      OVERNIGHT EVENTS: none    REVIEW OF SYSTEMS: denies chest pain/SOB, diaphoresis, no F/C, cough, dizziness, headache, blurry vision, nausea, vomiting, abdominal pain. All others review of systems negative     MEDICATIONS  (STANDING):  ALBUTerol/ipratropium for Nebulization 3 milliLiter(s) Nebulizer every 8 hours  amLODIPine   Tablet 10 milliGRAM(s) Oral daily  buDESOnide 160 MICROgram(s)/formoterol 4.5 MICROgram(s) Inhaler 2 Puff(s) Inhalation two times a day  colchicine 0.6 milliGRAM(s) Oral daily  folic acid 1 milliGRAM(s) Oral daily  morphine ER Tablet 15 milliGRAM(s) Oral every 12 hours  multivitamin/minerals 1 Tablet(s) Oral daily  nicotine -  14 mG/24Hr(s) Patch 1 patch Transdermal daily  pancrelipase  (CREON 36,000 Lipase Units) 2 Capsule(s) Oral three times a day with meals  sodium chloride 0.9%. 1000 milliLiter(s) (100 mL/Hr) IV Continuous <Continuous>  thiamine 100 milliGRAM(s) Oral daily    MEDICATIONS  (PRN):  LORazepam     Tablet 2 milliGRAM(s) Oral every 6 hours PRN Anxiety  morphine  - Injectable 2 milliGRAM(s) IV Push every 6 hours PRN Severe Pain (7 - 10)  ondansetron Injectable 8 milliGRAM(s) IV Push every 8 hours PRN Nausea and/or Vomiting  oxyCODONE    5 mG/acetaminophen 325 mG 1 Tablet(s) Oral every 6 hours PRN Moderate Pain (4 - 6)      Allergies    No Known Allergies    Intolerances        T(F): 98.1 (03-26-19 @ 11:00), Max: 98.1 (03-26-19 @ 11:00)  HR: 98 (03-26-19 @ 11:00) (78 - 98)  BP: 140/70 (03-26-19 @ 11:00) (128/66 - 140/70)  RR: 18 (03-26-19 @ 11:00) (18 - 18)  SpO2: 99% (03-26-19 @ 11:00) (92% - 99%)  Wt(kg): --    PHYSICAL EXAM:  GENERAL: NAD, well-groomed, well-developed  HEAD:  Atraumatic, Normocephalic  EYES: EOMI, PERRLA, conjunctiva and sclera clear  ENMT: No tonsillar erythema, exudates, or enlargement; Moist mucous membranes, Good dentition, No lesions  NECK: Supple, No JVD, Normal thyroid  NERVOUS SYSTEM:  Alert & Oriented X2, Good concentration; Motor Strength 5/5 B/L upper and lower extremities; DTRs 2+ intact and symmetric  CHEST/LUNG: Clear to percussion bilaterally; No rales, rhonchi, wheezing, or rubs BL  HEART: Regular rate and rhythm; No murmurs, rubs, or gallops  ABDOMEN: Soft, Nontender, Nondistended; Bowel sounds present  EXTREMITIES:  2+ Peripheral Pulses, No clubbing, cyanosis, or edema BL LE  LYMPH: No lymphadenopathy noted  SKIN: No rashes or lesions    LABS:                        7.0    13.69 )-----------( 335      ( 26 Mar 2019 07:30 )             23.8     03-26    141  |  116<H>  |  19  ----------------------------<  100<H>  4.0   |  17<L>  |  1.76<H>    Ca    7.6<L>      26 Mar 2019 07:30  Phos  3.5     03-25  Mg     2.1     03-25    TPro  6.1  /  Alb  2.2<L>  /  TBili  0.3  /  DBili  x   /  AST  22  /  ALT  14  /  AlkPhos  107  03-25          Culture - Urine (collected 23 Mar 2019 21:39)  Source: .Urine  Preliminary Report (24 Mar 2019 17:12):    >100,000 CFU/ml Gram positive organisms    Culture - Urine (collected 20 Mar 2019 10:40)  Source: .Urine  Final Report (21 Mar 2019 19:09):    <10,000 CFU/mL Normal Urogenital Mariah      RADIOLOGY & ADDITIONAL TESTS:    Imaging Personally Reviewed:  [x ] YES    < from: TTE Echo Doppler w/o Cont (03.21.19 @ 08:50) >   1. Left ventricular ejection fraction, by visual estimation, is 60 to   65%.   2. Normal global left ventricular systolic function.   3. There ismild concentric left ventricular hypertrophy.   4. Degenerative mitral valve.   5. Mild mitral annular calcification.   6. Mild mitral valve regurgitation.   7. Thickening and calcification of the anterior and posterior mitral   valve leaflets.   8. Mild tricuspid regurgitation.   9. Mild to moderate aortic regurgitation.  10. Trace pulmonic valve regurgitation.  11. The aortic valve mean gradient is 6.3 mmHg consistent with normally   opening aortic valve.    < end of copied text >      Consultant(s) Notes Reviewed:  [x ] YES     Care Discussed with [x ] Consultants [X ] Patient [ ] Family  [x ]    [x ]  Other; RN

## 2019-03-26 NOTE — PROGRESS NOTE ADULT - SUBJECTIVE AND OBJECTIVE BOX
Gastroenterology  Patient seen and examined bedside resting comfortably.  No complaints offered.   No abdominal pain  no active gi bleeding  Denies nausea and vomiting. Tolerating diet.  Normal flatus/BM.     T(F): 98.6 (03-26-19 @ 14:40), Max: 98.6 (03-26-19 @ 14:40)  HR: 86 (03-26-19 @ 14:40) (78 - 98)  BP: 139/79 (03-26-19 @ 14:40) (128/66 - 140/70)  RR: 18 (03-26-19 @ 14:40) (18 - 18)  SpO2: 99% (03-26-19 @ 14:40) (92% - 99%)  Wt(kg): --  CAPILLARY BLOOD GLUCOSE          PHYSICAL EXAM:  General: NAD, WDWN.   Neuro:  Alert & responsive  HEENT: NCAT, EOMI, conjunctiva clear  CV: +S1+S2 regular rate and rhythm  Lung: clear to ausculation bilaterally, respirations nonlabored, good inspiratory effort  Abdomen: soft, Non Tender, No distention Normal active BS  Extremities: no cyanosis, clubbing or edema    LABS:                        7.0    13.69 )-----------( 335      ( 26 Mar 2019 07:30 )             23.8     03-26    141  |  116<H>  |  19  ----------------------------<  100<H>  4.0   |  17<L>  |  1.76<H>    Ca    7.6<L>      26 Mar 2019 07:30  Phos  3.5     03-25  Mg     2.1     03-25    TPro  6.1  /  Alb  2.2<L>  /  TBili  0.3  /  DBili  x   /  AST  22  /  ALT  14  /  AlkPhos  107  03-25    LIVER FUNCTIONS - ( 25 Mar 2019 06:26 )  Alb: 2.2 g/dL / Pro: 6.1 gm/dL / ALK PHOS: 107 U/L / ALT: 14 U/L / AST: 22 U/L / GGT: x             I&O's Detail    25 Mar 2019 07:01  -  26 Mar 2019 07:00  --------------------------------------------------------  IN:    sodium chloride 0.9%.: 1100 mL  Total IN: 1100 mL    OUT:    Voided: 700 mL  Total OUT: 700 mL    Total NET: 400 mL      26 Mar 2019 07:01  -  26 Mar 2019 15:37  --------------------------------------------------------  IN:    sodium chloride 0.9%.: 600 mL  Total IN: 600 mL    OUT:    Voided: 300 mL  Total OUT: 300 mL    Total NET: 300 mL        03-26 @ 07:30    141 | 116 | 19  /7.6 | -- | --  _______________________/  4.0 | 17 | 1.76                           \par   Lipase, Serum: 1007 U/L (03-26 @ 07:30)

## 2019-03-26 NOTE — PROGRESS NOTE BEHAVIORAL HEALTH - AXIS III
admitted for acute pancreatitis/Acute renal failure/Anemia post 2 units PRBC; Hypokalemia & Hypophosphatemia; pre renal azotemia; gout; HTN; Hepatitis C

## 2019-03-26 NOTE — PROGRESS NOTE ADULT - PROBLEM SELECTOR PLAN 1
Hydration per renal  Follow Serum amylase and lipase  Lipid panel  Abdominal sonogram  will advance to DASH  diet will need Creon 22260 units TID with meals.

## 2019-03-26 NOTE — PROGRESS NOTE BEHAVIORAL HEALTH - OTHER
looks older than stated age lower end of fair - needs haircut higher end of fair deferred at this time

## 2019-03-26 NOTE — PROGRESS NOTE BEHAVIORAL HEALTH - RISK ASSESSMENT
Chronic risk factors: ongoing substance use; psychosocial stressors; chronic untreatable illness; single. Protective factors: young; healthy; medication and treatment compliant; no history of hospitalizations, no formal diagnosis; no suicide attempts; no self-injurious behavior; no hx of aggression/violence; no legal issues; motivated for help; articulate; strong family support; access to health services. No acute risk factors identified

## 2019-03-26 NOTE — PROGRESS NOTE ADULT - SUBJECTIVE AND OBJECTIVE BOX
HPI:  58 year old man with no PMH (per him), former ETOH abuser presents to ED with complaint of abdominal pain with Back pain.  He is AAO x 3 but does not seem to be a reliable historian regarding PMH.  He does not know any medications (says "they all don't work, whatever they are, I don't take them").  In addition to his abdominal pain and Back pain he complains of nausea and vomiting.  He offers no other complaints.  In the ED, pt's h/h 6/21.7, BMP consistent with renal failure without baseline, K 2.4, supplemented IV in ED.  EKG NSR.  Lipase 1404, BNP 3818.  CT imaging consistent with chronic pancreatitis. (19 Mar 2019 21:52)  h/o prev treated hepc   today no hicupps but scratching himself more   has had itch x 1 year     PAST MEDICAL & SURGICAL HISTORY:    Recent Travel:    Immunizations:    Allergies    No Known Allergies    Intolerances        MEDICATIONS  (STANDING):  ALBUTerol/ipratropium for Nebulization 3 milliLiter(s) Nebulizer every 8 hours  amLODIPine   Tablet 10 milliGRAM(s) Oral daily  buDESOnide 160 MICROgram(s)/formoterol 4.5 MICROgram(s) Inhaler 2 Puff(s) Inhalation two times a day  colchicine 0.6 milliGRAM(s) Oral daily  folic acid 1 milliGRAM(s) Oral daily  morphine ER Tablet 15 milliGRAM(s) Oral every 12 hours  multivitamin/minerals 1 Tablet(s) Oral daily  nicotine -  14 mG/24Hr(s) Patch 1 patch Transdermal daily  pancrelipase  (CREON 36,000 Lipase Units) 2 Capsule(s) Oral three times a day with meals  sodium chloride 0.9%. 1000 milliLiter(s) (100 mL/Hr) IV Continuous <Continuous>  thiamine 100 milliGRAM(s) Oral daily    MEDICATIONS  (PRN):  LORazepam     Tablet 2 milliGRAM(s) Oral every 6 hours PRN Anxiety  morphine  - Injectable 2 milliGRAM(s) IV Push every 6 hours PRN Severe Pain (7 - 10)  ondansetron Injectable 8 milliGRAM(s) IV Push every 8 hours PRN Nausea and/or Vomiting  oxyCODONE    5 mG/acetaminophen 325 mG 1 Tablet(s) Oral every 6 hours PRN Moderate Pain (4 - 6)      REVIEW OF SYSTEMS:  CONSTITUTIONAL: No fever, weight loss, or fatigue  EYES: No eye pain, visual disturbances, or discharge  ENMT:  No difficulty hearing, tinnitus, vertigo; No sinus or throat pain  NECK: No pain or stiffness  RESPIRATORY: No cough, wheezing, chills or hemoptysis; No shortness of breath  CARDIOVASCULAR: No chest pain, palpitations, dizziness, or leg swelling  GASTROINTESTINAL: No abdominal or epigastric pain. No nausea, vomiting, or hematemesis; No diarrhea or constipation. No melena or hematochezia.  GENITOURINARY: No dysuria, frequency, hematuria, or incontinence  NEUROLOGICAL: No headaches, memory loss, loss of strength, numbness, or tremors  SKIN: itching  and scratches   LYMPH NODES: No enlarged glands  ENDOCRINE: No heat or cold intolerance; No hair loss  MUSCULOSKELETAL: No joint pain or swelling; No muscle, back, or extremity pain  PSYCHIATRIC: No depression, anxiety, mood swings, or difficulty sleeping  HEME/LYMPH: No easy bruising, or bleeding gums  ALLERGY AND IMMUNOLOGIC: No hives or eczema    VITAL SIGNS:    T(C): 36.7 (03-26-19 @ 11:00), Max: 36.7 (03-26-19 @ 11:00)  T(F): 98.1 (03-26-19 @ 11:00), Max: 98.1 (03-26-19 @ 11:00)  HR: 98 (03-26-19 @ 11:00) (78 - 98)  BP: 140/70 (03-26-19 @ 11:00) (128/66 - 140/70)  RR: 18 (03-26-19 @ 11:00) (18 - 18)  SpO2: 99% (03-26-19 @ 11:00) (92% - 99%)    PHYSICAL EXAM:    GENERAL: NAD, unkempt   scratching   HEAD:  Atraumatic, Normocephalic  EYES: EOMI, PERRLA, conjunctiva and sclera clear  ENMT:; Moist mucous membranes,   poor teeth   NECK: Supple, No JVD, Normal thyroid  NERVOUS SYSTEM:  Alert & Oriented X3, Good concentration;   CHEST/LUNG: Clear to auscultation bilaterally; No rales, rhonchi, wheezing bilaterally  HEART: Regular rate and rhythm; No murmurs, rubs, or gallops  ABDOMEN: Soft, Nontender, Nondistended; Bowel sounds present  EXTREMITIES:  2+ Peripheral Pulses, No clubbing, cyanosis, or edema  LYMPH: No lymphadenopathy noted  SKIN: No rashes or lesions  BACK: no pressor sore     LABS:                         7.0    13.69 )-----------( 335      ( 26 Mar 2019 07:30 )             23.8     03-26    141  |  116<H>  |  19  ----------------------------<  100<H>  4.0   |  17<L>  |  1.76<H>    Ca    7.6<L>      26 Mar 2019 07:30  Phos  3.5     03-25  Mg     2.1     03-25    TPro  6.1  /  Alb  2.2<L>  /  TBili  0.3  /  DBili  x   /  AST  22  /  ALT  14  /  AlkPhos  107  03-25    LIVER FUNCTIONS - ( 25 Mar 2019 06:26 )  Alb: 2.2 g/dL / Pro: 6.1 gm/dL / ALK PHOS: 107 U/L / ALT: 14 U/L / AST: 22 U/L / GGT: x                                   Culture Results:   No growth to date. (03-25 @ 09:30)  Culture Results:   No growth to date. (03-25 @ 09:30)  Culture Results:   >100,000 CFU/ml Enterococcus faecalis (03-23 @ 21:39)  Culture Results:   <10,000 CFU/mL Normal Urogenital Mariah (03-20 @ 10:40)                Radiology:      < from: CT Abdomen and Pelvis w/ Oral Cont (03.19.19 @ 19:21) >  IMPRESSION:     Numerous calcifications in the pancreatic head and pancreatic body.   Dilatation of the pancreatic duct within the pancreatic body and tail.   Findings likely representing chronic pancreatitis. Limited evaluation for   focal mass secondary to lack of IV contrast. If concern for focal mass,   repeat CT or MRI with and without contrast can be obtained.  No definite   CT findings of acute pancreatitis.                LACEY ELLIS M.D., ATTENDING RADIOLOGIST  This document has been electronically signed. Mar 19 2019  7:56PM    < end of copied text > HPI:  58 year old man with no PMH (per him), former ETOH abuser presents to ED with complaint of abdominal pain with Back pain.  He is AAO x 3 but does not seem to be a reliable historian regarding PMH.  He does not know any medications (says "they all don't work, whatever they are, I don't take them").  In addition to his abdominal pain and Back pain he complains of nausea and vomiting.  He offers no other complaints.  In the ED, pt's h/h 6/21.7, BMP consistent with renal failure without baseline, K 2.4, supplemented IV in ED.  EKG NSR.  Lipase 1404, BNP 3818.  CT imaging consistent with chronic pancreatitis. (19 Mar 2019 21:52)  h/o prev treated hepc   today no hicupps but scratching himself more   has had itch x 1 year     PAST MEDICAL & SURGICAL HISTORY:    Recent Travel:    Immunizations:    Allergies    No Known Allergies    Intolerances        MEDICATIONS  (STANDING):  ALBUTerol/ipratropium for Nebulization 3 milliLiter(s) Nebulizer every 8 hours  amLODIPine   Tablet 10 milliGRAM(s) Oral daily  buDESOnide 160 MICROgram(s)/formoterol 4.5 MICROgram(s) Inhaler 2 Puff(s) Inhalation two times a day  colchicine 0.6 milliGRAM(s) Oral daily  folic acid 1 milliGRAM(s) Oral daily  morphine ER Tablet 15 milliGRAM(s) Oral every 12 hours  multivitamin/minerals 1 Tablet(s) Oral daily  nicotine -  14 mG/24Hr(s) Patch 1 patch Transdermal daily  pancrelipase  (CREON 36,000 Lipase Units) 2 Capsule(s) Oral three times a day with meals  sodium chloride 0.9%. 1000 milliLiter(s) (100 mL/Hr) IV Continuous <Continuous>  thiamine 100 milliGRAM(s) Oral daily    MEDICATIONS  (PRN):  LORazepam     Tablet 2 milliGRAM(s) Oral every 6 hours PRN Anxiety  morphine  - Injectable 2 milliGRAM(s) IV Push every 6 hours PRN Severe Pain (7 - 10)  ondansetron Injectable 8 milliGRAM(s) IV Push every 8 hours PRN Nausea and/or Vomiting  oxyCODONE    5 mG/acetaminophen 325 mG 1 Tablet(s) Oral every 6 hours PRN Moderate Pain (4 - 6)      REVIEW OF SYSTEMS:  CONSTITUTIONAL: No fever, weight loss, or fatigue  EYES: No eye pain, visual disturbances, or discharge  ENMT:  No difficulty hearing, tinnitus, vertigo; No sinus or throat pain  NECK: No pain or stiffness  RESPIRATORY: No cough, wheezing, chills or hemoptysis; No shortness of breath  CARDIOVASCULAR: No chest pain, palpitations, dizziness, or leg swelling  GASTROINTESTINAL: No abdominal or epigastric pain. No nausea, vomiting, or hematemesis; No diarrhea or constipation. No melena or hematochezia.  GENITOURINARY: No dysuria, frequency, hematuria, or incontinence  NEUROLOGICAL: No headaches, memory loss, loss of strength, numbness, or tremors  SKIN: itching  and scratches   LYMPH NODES: No enlarged glands  ENDOCRINE: No heat or cold intolerance; No hair loss  MUSCULOSKELETAL: gout is not better   PSYCHIATRIC: No depression, anxiety, mood swings, or difficulty sleeping  HEME/LYMPH: No easy bruising, or bleeding gums  ALLERGY AND IMMUNOLOGIC: itching all over   VITAL SIGNS:    T(C): 36.7 (03-26-19 @ 11:00), Max: 36.7 (03-26-19 @ 11:00)  T(F): 98.1 (03-26-19 @ 11:00), Max: 98.1 (03-26-19 @ 11:00)  HR: 98 (03-26-19 @ 11:00) (78 - 98)  BP: 140/70 (03-26-19 @ 11:00) (128/66 - 140/70)  RR: 18 (03-26-19 @ 11:00) (18 - 18)  SpO2: 99% (03-26-19 @ 11:00) (92% - 99%)    PHYSICAL EXAM:    GENERAL: NAD, unkempt   scratching   HEAD:  Atraumatic, Normocephalic  EYES: EOMI, PERRLA, conjunctiva and sclera clear  ENMT:; Moist mucous membranes,   poor teeth   NECK: Supple, No JVD, Normal thyroid  NERVOUS SYSTEM:  Alert & Oriented X3, Good concentration;   CHEST/LUNG: Clear to auscultation bilaterally; No rales, rhonchi, wheezing bilaterally  HEART: Regular rate and rhythm; No murmurs, rubs, or gallops  ABDOMEN: Soft, Nontender, Nondistended; Bowel sounds present  EXTREMITIES:  2+ Peripheral Pulses, No clubbing, cyanosis, or edema  decreased range of motion not much swelling   LYMPH: No lymphadenopathy noted  SKIN: scratches all over joel back   BACK: no pressor sore     LABS:                         7.0    13.69 )-----------( 335      ( 26 Mar 2019 07:30 )             23.8     03-26    141  |  116<H>  |  19  ----------------------------<  100<H>  4.0   |  17<L>  |  1.76<H>    Ca    7.6<L>      26 Mar 2019 07:30  Phos  3.5     03-25  Mg     2.1     03-25    TPro  6.1  /  Alb  2.2<L>  /  TBili  0.3  /  DBili  x   /  AST  22  /  ALT  14  /  AlkPhos  107  03-25    LIVER FUNCTIONS - ( 25 Mar 2019 06:26 )  Alb: 2.2 g/dL / Pro: 6.1 gm/dL / ALK PHOS: 107 U/L / ALT: 14 U/L / AST: 22 U/L / GGT: x                                   Culture Results:   No growth to date. (03-25 @ 09:30)  Culture Results:   No growth to date. (03-25 @ 09:30)  Culture Results:   >100,000 CFU/ml Enterococcus faecalis (03-23 @ 21:39)  Culture Results:   <10,000 CFU/mL Normal Urogenital Mariah (03-20 @ 10:40)                Radiology:      < from: CT Abdomen and Pelvis w/ Oral Cont (03.19.19 @ 19:21) >  IMPRESSION:     Numerous calcifications in the pancreatic head and pancreatic body.   Dilatation of the pancreatic duct within the pancreatic body and tail.   Findings likely representing chronic pancreatitis. Limited evaluation for   focal mass secondary to lack of IV contrast. If concern for focal mass,   repeat CT or MRI with and without contrast can be obtained.  No definite   CT findings of acute pancreatitis.                LACEY ELLIS M.D., ATTENDING RADIOLOGIST  This document has been electronically signed. Mar 19 2019  7:56PM    < end of copied text >

## 2019-03-26 NOTE — PROGRESS NOTE ADULT - ASSESSMENT
leucocytosis ?? gout  resolved just on colchicine   knee is better today   hiccups is what is bothering him   continue current treatment for now   NEGATIVE ua  no urinary signs and symptoms   urine e fecalis   need to treat ??   will not treat   will try solumedrol   ?? gi follow up ?? mri liver drferred to gi

## 2019-03-27 LAB
ANION GAP SERPL CALC-SCNC: 11 MMOL/L — SIGNIFICANT CHANGE UP (ref 5–17)
BUN SERPL-MCNC: 20 MG/DL — SIGNIFICANT CHANGE UP (ref 7–23)
CALCIUM SERPL-MCNC: 8.2 MG/DL — LOW (ref 8.5–10.1)
CHLORIDE SERPL-SCNC: 116 MMOL/L — HIGH (ref 96–108)
CO2 SERPL-SCNC: 17 MMOL/L — LOW (ref 22–31)
CREAT SERPL-MCNC: 1.75 MG/DL — HIGH (ref 0.5–1.3)
GLUCOSE SERPL-MCNC: 89 MG/DL — SIGNIFICANT CHANGE UP (ref 70–99)
HCT VFR BLD CALC: 30.6 % — LOW (ref 39–50)
HCT VFR BLD CALC: 30.6 % — LOW (ref 39–50)
HCV RNA SERPL QL NAA+PROBE: SIGNIFICANT CHANGE UP
HGB BLD-MCNC: 9.1 G/DL — LOW (ref 13–17)
HGB BLD-MCNC: 9.3 G/DL — LOW (ref 13–17)
LIDOCAIN IGE QN: 862 U/L — HIGH (ref 73–393)
MCHC RBC-ENTMCNC: 24.9 PG — LOW (ref 27–34)
MCHC RBC-ENTMCNC: 25.2 PG — LOW (ref 27–34)
MCHC RBC-ENTMCNC: 29.7 GM/DL — LOW (ref 32–36)
MCHC RBC-ENTMCNC: 30.4 GM/DL — LOW (ref 32–36)
MCV RBC AUTO: 82.9 FL — SIGNIFICANT CHANGE UP (ref 80–100)
MCV RBC AUTO: 83.6 FL — SIGNIFICANT CHANGE UP (ref 80–100)
NRBC # BLD: 0 /100 WBCS — SIGNIFICANT CHANGE UP (ref 0–0)
NRBC # BLD: 0 /100 WBCS — SIGNIFICANT CHANGE UP (ref 0–0)
PLATELET # BLD AUTO: 365 K/UL — SIGNIFICANT CHANGE UP (ref 150–400)
PLATELET # BLD AUTO: 387 K/UL — SIGNIFICANT CHANGE UP (ref 150–400)
POTASSIUM SERPL-MCNC: 3.7 MMOL/L — SIGNIFICANT CHANGE UP (ref 3.5–5.3)
POTASSIUM SERPL-SCNC: 3.7 MMOL/L — SIGNIFICANT CHANGE UP (ref 3.5–5.3)
RBC # BLD: 3.66 M/UL — LOW (ref 4.2–5.8)
RBC # BLD: 3.69 M/UL — LOW (ref 4.2–5.8)
RBC # FLD: 20.6 % — HIGH (ref 10.3–14.5)
RBC # FLD: 20.8 % — HIGH (ref 10.3–14.5)
SODIUM SERPL-SCNC: 144 MMOL/L — SIGNIFICANT CHANGE UP (ref 135–145)
WBC # BLD: 12.58 K/UL — HIGH (ref 3.8–10.5)
WBC # BLD: 13.24 K/UL — HIGH (ref 3.8–10.5)
WBC # FLD AUTO: 12.58 K/UL — HIGH (ref 3.8–10.5)
WBC # FLD AUTO: 13.24 K/UL — HIGH (ref 3.8–10.5)

## 2019-03-27 PROCEDURE — 99233 SBSQ HOSP IP/OBS HIGH 50: CPT

## 2019-03-27 RX ORDER — DIPHENHYDRAMINE HCL 50 MG
25 CAPSULE ORAL ONCE
Qty: 0 | Refills: 0 | Status: COMPLETED | OUTPATIENT
Start: 2019-03-27 | End: 2019-03-27

## 2019-03-27 RX ADMIN — MORPHINE SULFATE 15 MILLIGRAM(S): 50 CAPSULE, EXTENDED RELEASE ORAL at 05:32

## 2019-03-27 RX ADMIN — MORPHINE SULFATE 15 MILLIGRAM(S): 50 CAPSULE, EXTENDED RELEASE ORAL at 18:03

## 2019-03-27 RX ADMIN — Medication 25 MILLIGRAM(S): at 16:55

## 2019-03-27 RX ADMIN — MORPHINE SULFATE 2 MILLIGRAM(S): 50 CAPSULE, EXTENDED RELEASE ORAL at 07:25

## 2019-03-27 RX ADMIN — Medication 1 PATCH: at 12:33

## 2019-03-27 RX ADMIN — SODIUM CHLORIDE 100 MILLILITER(S): 9 INJECTION INTRAMUSCULAR; INTRAVENOUS; SUBCUTANEOUS at 00:54

## 2019-03-27 RX ADMIN — Medication 1 MILLIGRAM(S): at 12:33

## 2019-03-27 RX ADMIN — OXYCODONE AND ACETAMINOPHEN 1 TABLET(S): 5; 325 TABLET ORAL at 16:56

## 2019-03-27 RX ADMIN — SODIUM CHLORIDE 100 MILLILITER(S): 9 INJECTION INTRAMUSCULAR; INTRAVENOUS; SUBCUTANEOUS at 21:31

## 2019-03-27 RX ADMIN — AMLODIPINE BESYLATE 10 MILLIGRAM(S): 2.5 TABLET ORAL at 05:32

## 2019-03-27 RX ADMIN — OXYCODONE AND ACETAMINOPHEN 1 TABLET(S): 5; 325 TABLET ORAL at 11:45

## 2019-03-27 RX ADMIN — Medication 1 TABLET(S): at 12:33

## 2019-03-27 RX ADMIN — MORPHINE SULFATE 2 MILLIGRAM(S): 50 CAPSULE, EXTENDED RELEASE ORAL at 13:45

## 2019-03-27 RX ADMIN — Medication 0.6 MILLIGRAM(S): at 13:29

## 2019-03-27 RX ADMIN — MORPHINE SULFATE 2 MILLIGRAM(S): 50 CAPSULE, EXTENDED RELEASE ORAL at 13:25

## 2019-03-27 RX ADMIN — Medication 100 MILLIGRAM(S): at 12:33

## 2019-03-27 RX ADMIN — MORPHINE SULFATE 2 MILLIGRAM(S): 50 CAPSULE, EXTENDED RELEASE ORAL at 19:46

## 2019-03-27 RX ADMIN — MORPHINE SULFATE 2 MILLIGRAM(S): 50 CAPSULE, EXTENDED RELEASE ORAL at 00:53

## 2019-03-27 RX ADMIN — MORPHINE SULFATE 15 MILLIGRAM(S): 50 CAPSULE, EXTENDED RELEASE ORAL at 07:25

## 2019-03-27 RX ADMIN — OXYCODONE AND ACETAMINOPHEN 1 TABLET(S): 5; 325 TABLET ORAL at 10:42

## 2019-03-27 RX ADMIN — MORPHINE SULFATE 2 MILLIGRAM(S): 50 CAPSULE, EXTENDED RELEASE ORAL at 02:45

## 2019-03-27 RX ADMIN — MORPHINE SULFATE 2 MILLIGRAM(S): 50 CAPSULE, EXTENDED RELEASE ORAL at 19:31

## 2019-03-27 RX ADMIN — MORPHINE SULFATE 2 MILLIGRAM(S): 50 CAPSULE, EXTENDED RELEASE ORAL at 07:45

## 2019-03-27 NOTE — PROGRESS NOTE ADULT - ASSESSMENT
59y/o male with Acute on Chronic Pancreatitis, Microcytic anemia, Etoh abuse - patient states he has been drinking for the last 35+ years and quit last weak because of abdominal pain  Recent drop in hemoglobin requiring blood transfusion - guaiac negative

## 2019-03-27 NOTE — PROGRESS NOTE ADULT - ASSESSMENT
58 year old man with no PMH (per him), former ETOH abuser presents to ED with complaint of abdominal pain, Back pain & n/v and was found to have Acute on chronic pancreatitis, YUNIEL & hypokalemia.    Acute on chronic pancreatitis  - CT showed numerous calcifications in the pancreatic head and pancreatic body w/ dilatation of the pancreatic duct within the pancreatic body and tail c/w  chronic pancreatitis  - lipase improving  - c/w IVF   - advanced to fulls, will advance to solids tomorrow if he tolerates diet  - Analgesia PRN  - triglyceride WNL   - abd US showed a fatty liver w/ a dilatated main pancreatic duct and coarse calcifications within the pancreatic head compatible w/ chronic   pancreatitis  - as per GI, will consider MRCP if lipase continues to go up    R knee gout   - started on colchacine & watch CrCl  - procal 0.46  - UA negative    Hypokalemia & Hypophosphatemia  - resolved    Anemia due to poor intake   - Hgb was 6 on admission  - post 2 units pRBC, give 2 more units, send FOBT  - Retic Count, B12 and Folate are WNL  - iron panel showed inflammatory anemia     ETOH depedence  - c/w thiamine and add folic acid and MVN  - watch for withdrawal     YUNIEL   - suspect underlying CKD, but no baseline is available   - c/w IVF  - nephrology consulted    HTN  - c/w Norvasc     HCV  - patient says he's aware that he has Hep C and was being treated as outpatient   - hep C RNA pending    Prophylaxis:   DVT: SCD  GI: PO diet

## 2019-03-27 NOTE — PROGRESS NOTE ADULT - SUBJECTIVE AND OBJECTIVE BOX
Patient is a 58y old  Male who presents with a chief complaint of pancreatitis, anemia, abnormal renal fxn (27 Mar 2019 11:25)      OVERNIGHT EVENTS: none    REVIEW OF SYSTEMS: denies chest pain/SOB, diaphoresis, no F/C, cough, dizziness, headache, blurry vision, nausea, vomiting, abdominal pain. All others review of systems negative     MEDICATIONS  (STANDING):  ALBUTerol/ipratropium for Nebulization 3 milliLiter(s) Nebulizer every 8 hours  amLODIPine   Tablet 10 milliGRAM(s) Oral daily  buDESOnide 160 MICROgram(s)/formoterol 4.5 MICROgram(s) Inhaler 2 Puff(s) Inhalation two times a day  colchicine 0.6 milliGRAM(s) Oral daily  folic acid 1 milliGRAM(s) Oral daily  morphine ER Tablet 15 milliGRAM(s) Oral every 12 hours  multivitamin/minerals 1 Tablet(s) Oral daily  nicotine -  14 mG/24Hr(s) Patch 1 patch Transdermal daily  pancrelipase  (CREON 36,000 Lipase Units) 2 Capsule(s) Oral three times a day with meals  sodium chloride 0.9%. 1000 milliLiter(s) (100 mL/Hr) IV Continuous <Continuous>  thiamine 100 milliGRAM(s) Oral daily    MEDICATIONS  (PRN):  LORazepam     Tablet 2 milliGRAM(s) Oral every 6 hours PRN Anxiety  morphine  - Injectable 2 milliGRAM(s) IV Push every 6 hours PRN Severe Pain (7 - 10)  ondansetron Injectable 8 milliGRAM(s) IV Push every 8 hours PRN Nausea and/or Vomiting  oxyCODONE    5 mG/acetaminophen 325 mG 1 Tablet(s) Oral every 6 hours PRN Moderate Pain (4 - 6)      Allergies    No Known Allergies    Intolerances        T(F): 97.4 (03-27-19 @ 12:49), Max: 98.6 (03-26-19 @ 14:40)  HR: 84 (03-27-19 @ 12:49) (76 - 95)  BP: 147/82 (03-27-19 @ 12:49) (133/76 - 160/82)  RR: 18 (03-27-19 @ 12:49) (17 - 18)  SpO2: 99% (03-27-19 @ 12:49) (96% - 99%)      PHYSICAL EXAM:  GENERAL: NAD, well-groomed, well-developed  HEAD:  Atraumatic, Normocephalic  EYES: EOMI, PERRLA, conjunctiva and sclera clear  ENMT: No tonsillar erythema, exudates, or enlargement; Moist mucous membranes, Good dentition, No lesions  NECK: Supple, No JVD, Normal thyroid  NERVOUS SYSTEM:  Alert & Oriented X2, Good concentration; Motor Strength 5/5 B/L upper and lower extremities; DTRs 2+ intact and symmetric  CHEST/LUNG: Clear to percussion bilaterally; No rales, rhonchi, wheezing, or rubs BL  HEART: Regular rate and rhythm; No murmurs, rubs, or gallops  ABDOMEN: Soft, Nontender, Nondistended; Bowel sounds present  EXTREMITIES:  2+ Peripheral Pulses, No clubbing, cyanosis, or edema BL LE  LYMPH: No lymphadenopathy noted  SKIN: No rashes or lesions          LABS:                        9.3    13.24 )-----------( 387      ( 27 Mar 2019 07:28 )             30.6     03-27    144  |  116<H>  |  20  ----------------------------<  89  3.7   |  17<L>  |  1.75<H>    Ca    8.2<L>      27 Mar 2019 07:28      Culture - Urine (collected 23 Mar 2019 21:39)  Source: .Urine  Preliminary Report (24 Mar 2019 17:12):    >100,000 CFU/ml Gram positive organisms    Culture - Urine (collected 20 Mar 2019 10:40)  Source: .Urine  Final Report (21 Mar 2019 19:09):    <10,000 CFU/mL Normal Urogenital Mariah      RADIOLOGY & ADDITIONAL TESTS:    Imaging Personally Reviewed:  [x ] YES    < from: TTE Echo Doppler w/o Cont (03.21.19 @ 08:50) >   1. Left ventricular ejection fraction, by visual estimation, is 60 to   65%.   2. Normal global left ventricular systolic function.   3. There ismild concentric left ventricular hypertrophy.   4. Degenerative mitral valve.   5. Mild mitral annular calcification.   6. Mild mitral valve regurgitation.   7. Thickening and calcification of the anterior and posterior mitral   valve leaflets.   8. Mild tricuspid regurgitation.   9. Mild to moderate aortic regurgitation.  10. Trace pulmonic valve regurgitation.  11. The aortic valve mean gradient is 6.3 mmHg consistent with normally   opening aortic valve.      Consultant(s) Notes Reviewed:  [x ] YES     Care Discussed with [x ] Consultants [X ] Patient [ ] Family  [x ]    [x ]  Other; RN

## 2019-03-27 NOTE — PROGRESS NOTE ADULT - SUBJECTIVE AND OBJECTIVE BOX
Gastroenterology  Patient seen and examined bedside resting comfortably.  C/O of medication making him nauseous, Per Nursing patient has been inducing vomiting.   Abdominal pain is well controlled and improved  Tolerating diet.  Normal flatus/BM. No Melena or hematochezia    T(F): 97 (03-27-19 @ 05:09), Max: 98.6 (03-26-19 @ 14:40)  HR: 84 (03-27-19 @ 05:09) (76 - 95)  BP: 160/82 (03-27-19 @ 05:09) (133/76 - 160/82)  RR: 18 (03-27-19 @ 05:09) (17 - 18)  SpO2: 99% (03-27-19 @ 05:09) (96% - 99%)  Wt(kg): --  CAPILLARY BLOOD GLUCOSE    PHYSICAL EXAM:  General: NAD, WDWN.   Neuro:  Alert & responsive  HEENT: NCAT, EOMI, conjunctiva clear  CV: +S1+S2 regular rate and rhythm  Lung: clear to ausculation bilaterally, respirations nonlabored, good inspiratory effort  Abdomen: soft, Non tender, No Distension. Normal active BS  Extremities: no pedal edema or calf tenderness noted     LABS:                        9.3    13.24 )-----------( 387      ( 27 Mar 2019 07:28 )             30.6     03-27    144  |  116<H>  |  20  ----------------------------<  89  3.7   |  17<L>  |  1.75<H>    Ca    8.2<L>      27 Mar 2019 07:28          I&O's Detail    26 Mar 2019 07:01  -  27 Mar 2019 07:00  --------------------------------------------------------  IN:    Oral Fluid: 420 mL    Packed Red Blood Cells: 302 mL    sodium chloride 0.9%.: 1200 mL  Total IN: 1922 mL    OUT:    Voided: 700 mL  Total OUT: 700 mL    Total NET: 1222 mL    Occult Blood, Feces (03.26.19 @ 15:20)    Occult Blood, Feces: Negative

## 2019-03-27 NOTE — PROGRESS NOTE ADULT - SUBJECTIVE AND OBJECTIVE BOX
58 year old man with no PMH (per him), former ETOH abuser presents to ED with complaint of abdominal pain with Back pain.  He is AAO x 3 but does not seem to be a reliable historian regarding PMH.  He does not know any medications (says "they all don't work, whatever they are, I don't take them").  In addition to his abdominal pain and Back pain he complains of nausea and vomiting.  He offers no other complaints.  In the ED, pt's h/h 6/21.7, BMP consistent with renal failure without baseline, K 2.4, supplemented IV in ED.  EKG NSR.  Lipase 1404, BNP 3818.  CT imaging consistent with chronic pancreatitis. (19 Mar 2019 21:52)  h/o prev treated hepc   today no hicupps but scratching himself more   has had itch x 1 year     Allergies    No Known Allergies    Intolerances        MEDICATIONS  (STANDING):  ALBUTerol/ipratropium for Nebulization 3 milliLiter(s) Nebulizer every 8 hours  amLODIPine   Tablet 10 milliGRAM(s) Oral daily  buDESOnide 160 MICROgram(s)/formoterol 4.5 MICROgram(s) Inhaler 2 Puff(s) Inhalation two times a day  colchicine 0.6 milliGRAM(s) Oral daily  folic acid 1 milliGRAM(s) Oral daily  morphine ER Tablet 15 milliGRAM(s) Oral every 12 hours  multivitamin/minerals 1 Tablet(s) Oral daily  nicotine -  14 mG/24Hr(s) Patch 1 patch Transdermal daily  pancrelipase  (CREON 36,000 Lipase Units) 2 Capsule(s) Oral three times a day with meals  sodium chloride 0.9%. 1000 milliLiter(s) (100 mL/Hr) IV Continuous <Continuous>  thiamine 100 milliGRAM(s) Oral daily    MEDICATIONS  (PRN):  LORazepam     Tablet 2 milliGRAM(s) Oral every 6 hours PRN Anxiety  morphine  - Injectable 2 milliGRAM(s) IV Push every 6 hours PRN Severe Pain (7 - 10)  ondansetron Injectable 8 milliGRAM(s) IV Push every 8 hours PRN Nausea and/or Vomiting  oxyCODONE    5 mG/acetaminophen 325 mG 1 Tablet(s) Oral every 6 hours PRN Moderate Pain (4 - 6)      REVIEW OF SYSTEMS:    CONSTITUTIONAL: No fever, chills, weight loss, or fatigue  HEENT: No sore throat, runny nose, ear ache  RESPIRATORY: No cough, wheezing, No shortness of breath  CARDIOVASCULAR: No chest pain, palpitations, dizziness  GASTROINTESTINAL: No abdominal pain. No nausea, vomiting, diarrhea  GENITOURINARY: No dysuria, increase frequency, hematuria, or incontinence  NEUROLOGICAL: No headaches, memory loss, loss of strength, numbness, or tremors, no weakness  EXTREMITY: No pedal edema BLE  SKIN: No itching, burning, rashes, or lesions     VITAL SIGNS:  T(C): 36 (03-27-19 @ 17:26), Max: 36.8 (03-26-19 @ 18:31)  T(F): 96.8 (03-27-19 @ 17:26), Max: 98.3 (03-26-19 @ 18:31)  HR: 91 (03-27-19 @ 17:26) (76 - 95)  BP: 171/84 (03-27-19 @ 17:26) (133/76 - 171/84)  RR: 17 (03-27-19 @ 17:26) (17 - 18)  SpO2: 98% (03-27-19 @ 17:26) (96% - 99%)  Wt(kg): --    PHYSICAL EXAM:    GENERAL: not in any distress  HEENT: Neck is supple, normocephalic, atraumatic   CHEST/LUNG: Clear to auscultation bilaterally; No rales, rhonchi, wheezing  HEART: Regular rate and rhythm; No murmurs, rubs, or gallops  ABDOMEN: Soft, Nontender, Nondistended; Bowel sounds present, no rebound   EXTREMITIES:  2+ Peripheral Pulses, No clubbing, cyanosis, or edema  GENITOURINARY:   SKIN: No rashes or lesions  BACK: no pressor sore   NERVOUS SYSTEM:  Alert & Oriented X3, Good concentration  PSYCH: normal affect     LABS:                         9.3    13.24 )-----------( 387      ( 27 Mar 2019 07:28 )             30.6     03-27    144  |  116<H>  |  20  ----------------------------<  89  3.7   |  17<L>  |  1.75<H>    Ca    8.2<L>      27 Mar 2019 07:28                                Culture Results:   No growth to date. (03-25 @ 09:30)  Culture Results:   No growth to date. (03-25 @ 09:30)  Culture Results:   >100,000 CFU/ml Enterococcus faecalis (03-23 @ 21:39)                Radiology: 58 year old man with no PMH (per him), former ETOH abuser presents to ED with complaint of abdominal pain with Back pain.  He is AAO x 3 but does not seem to be a reliable historian regarding PMH.  He does not know any medications (says "they all don't work, whatever they are, I don't take them").  In addition to his abdominal pain and Back pain he complains of nausea and vomiting.  He offers no other complaints.  In the ED, pt's h/h 6/21.7, BMP consistent with renal failure without baseline, K 2.4, supplemented IV in ED.  EKG NSR.  Lipase 1404, BNP 3818.  CT imaging consistent with chronic pancreatitis. (19 Mar 2019 21:52)  h/o prev treated hepc   today no hicupps but scratching himself more   has had itch x 1 year     Allergies    No Known Allergies    Intolerances        MEDICATIONS  (STANDING):  ALBUTerol/ipratropium for Nebulization 3 milliLiter(s) Nebulizer every 8 hours  amLODIPine   Tablet 10 milliGRAM(s) Oral daily  buDESOnide 160 MICROgram(s)/formoterol 4.5 MICROgram(s) Inhaler 2 Puff(s) Inhalation two times a day  colchicine 0.6 milliGRAM(s) Oral daily  folic acid 1 milliGRAM(s) Oral daily  morphine ER Tablet 15 milliGRAM(s) Oral every 12 hours  multivitamin/minerals 1 Tablet(s) Oral daily  nicotine -  14 mG/24Hr(s) Patch 1 patch Transdermal daily  pancrelipase  (CREON 36,000 Lipase Units) 2 Capsule(s) Oral three times a day with meals  sodium chloride 0.9%. 1000 milliLiter(s) (100 mL/Hr) IV Continuous <Continuous>  thiamine 100 milliGRAM(s) Oral daily    MEDICATIONS  (PRN):  LORazepam     Tablet 2 milliGRAM(s) Oral every 6 hours PRN Anxiety  morphine  - Injectable 2 milliGRAM(s) IV Push every 6 hours PRN Severe Pain (7 - 10)  ondansetron Injectable 8 milliGRAM(s) IV Push every 8 hours PRN Nausea and/or Vomiting  oxyCODONE    5 mG/acetaminophen 325 mG 1 Tablet(s) Oral every 6 hours PRN Moderate Pain (4 - 6)      REVIEW OF SYSTEMS:    poor historian   still itching   still hiccups      VITAL SIGNS:  T(C): 36 (03-27-19 @ 17:26), Max: 36.8 (03-26-19 @ 18:31)  T(F): 96.8 (03-27-19 @ 17:26), Max: 98.3 (03-26-19 @ 18:31)  HR: 91 (03-27-19 @ 17:26) (76 - 95)  BP: 171/84 (03-27-19 @ 17:26) (133/76 - 171/84)  RR: 17 (03-27-19 @ 17:26) (17 - 18)  SpO2: 98% (03-27-19 @ 17:26) (96% - 99%)  Wt(kg): --    PHYSICAL EXAM:    GENERAL: not in any distress  HEENT: Neck is supple, normocephalic, atraumatic   CHEST/LUNG: Clear to auscultation bilaterally; No rales, rhonchi, wheezing  HEART: Regular rate and rhythm; No murmurs, rubs, or gallops  ABDOMEN: Soft, Nontender, Nondistended; Bowel sounds present, no rebound   EXTREMITIES:  2+ Peripheral Pulses, No clubbing, cyanosis, or edema  SKIN: gen scratches all over  no rash per se   BACK: no pressor sore   NERVOUS SYSTEM:  Alert & Oriented X2    LABS:                         9.3    13.24 )-----------( 387      ( 27 Mar 2019 07:28 )             30.6     03-27    144  |  116<H>  |  20  ----------------------------<  89  3.7   |  17<L>  |  1.75<H>    Ca    8.2<L>      27 Mar 2019 07:28                                Culture Results:   No growth to date. (03-25 @ 09:30)  Culture Results:   No growth to date. (03-25 @ 09:30)  Culture Results:   >100,000 CFU/ml Enterococcus faecalis (03-23 @ 21:39)                Radiology:

## 2019-03-27 NOTE — PROGRESS NOTE ADULT - ASSESSMENT
leucocytosis ?? gout  resolved just on colchicine   knee is better today   hiccups is what is bothering him   continue current treatment for now   NEGATIVE ua  no urinary signs and symptoms   urine e fecalis   need to treat ??   will not treat   will try solumedrol   ?? gi follow up ?? mri liver drferred to gi leucocytosis ?? gout  resolved just on colchicine   NEGATIVE ua  no urinary signs and symptoms   urine e fecalis   need to treat ??   will not treat   will try solumedrol

## 2019-03-27 NOTE — PROGRESS NOTE ADULT - PROBLEM SELECTOR PLAN 1
Hydration per renal  Follow Serum amylase and lipase which are improved today  continue Creon with meals  will consider MRCP

## 2019-03-28 LAB
ALBUMIN SERPL ELPH-MCNC: 1.9 G/DL — LOW (ref 3.3–5)
ALP SERPL-CCNC: 95 U/L — SIGNIFICANT CHANGE UP (ref 40–120)
ALT FLD-CCNC: 14 U/L — SIGNIFICANT CHANGE UP (ref 12–78)
ANION GAP SERPL CALC-SCNC: 9 MMOL/L — SIGNIFICANT CHANGE UP (ref 5–17)
AST SERPL-CCNC: 20 U/L — SIGNIFICANT CHANGE UP (ref 15–37)
BILIRUB SERPL-MCNC: 0.3 MG/DL — SIGNIFICANT CHANGE UP (ref 0.2–1.2)
BUN SERPL-MCNC: 17 MG/DL — SIGNIFICANT CHANGE UP (ref 7–23)
CALCIUM SERPL-MCNC: 8 MG/DL — LOW (ref 8.5–10.1)
CHLORIDE SERPL-SCNC: 116 MMOL/L — HIGH (ref 96–108)
CO2 SERPL-SCNC: 18 MMOL/L — LOW (ref 22–31)
CREAT SERPL-MCNC: 1.69 MG/DL — HIGH (ref 0.5–1.3)
GLUCOSE SERPL-MCNC: 88 MG/DL — SIGNIFICANT CHANGE UP (ref 70–99)
HCT VFR BLD CALC: 32.2 % — LOW (ref 39–50)
HGB BLD-MCNC: 9.5 G/DL — LOW (ref 13–17)
LACTATE SERPL-SCNC: 0.9 MMOL/L — SIGNIFICANT CHANGE UP (ref 0.7–2)
LIDOCAIN IGE QN: 391 U/L — SIGNIFICANT CHANGE UP (ref 73–393)
MCHC RBC-ENTMCNC: 24.7 PG — LOW (ref 27–34)
MCHC RBC-ENTMCNC: 29.5 GM/DL — LOW (ref 32–36)
MCV RBC AUTO: 83.6 FL — SIGNIFICANT CHANGE UP (ref 80–100)
NRBC # BLD: 0 /100 WBCS — SIGNIFICANT CHANGE UP (ref 0–0)
PLATELET # BLD AUTO: 442 K/UL — HIGH (ref 150–400)
POTASSIUM SERPL-MCNC: 3.7 MMOL/L — SIGNIFICANT CHANGE UP (ref 3.5–5.3)
POTASSIUM SERPL-SCNC: 3.7 MMOL/L — SIGNIFICANT CHANGE UP (ref 3.5–5.3)
PROCALCITONIN SERPL-MCNC: 0.16 NG/ML — HIGH (ref 0.02–0.1)
PROT SERPL-MCNC: 5.5 GM/DL — LOW (ref 6–8.3)
RBC # BLD: 3.85 M/UL — LOW (ref 4.2–5.8)
RBC # FLD: 21.3 % — HIGH (ref 10.3–14.5)
SODIUM SERPL-SCNC: 143 MMOL/L — SIGNIFICANT CHANGE UP (ref 135–145)
WBC # BLD: 9.49 K/UL — SIGNIFICANT CHANGE UP (ref 3.8–10.5)
WBC # FLD AUTO: 9.49 K/UL — SIGNIFICANT CHANGE UP (ref 3.8–10.5)

## 2019-03-28 PROCEDURE — 99233 SBSQ HOSP IP/OBS HIGH 50: CPT

## 2019-03-28 PROCEDURE — 74018 RADEX ABDOMEN 1 VIEW: CPT | Mod: 26

## 2019-03-28 RX ORDER — DIPHENHYDRAMINE HCL 50 MG
25 CAPSULE ORAL ONCE
Qty: 0 | Refills: 0 | Status: COMPLETED | OUTPATIENT
Start: 2019-03-28 | End: 2019-03-28

## 2019-03-28 RX ORDER — MORPHINE SULFATE 50 MG/1
2 CAPSULE, EXTENDED RELEASE ORAL EVERY 6 HOURS
Qty: 0 | Refills: 0 | Status: DISCONTINUED | OUTPATIENT
Start: 2019-03-28 | End: 2019-03-28

## 2019-03-28 RX ORDER — OXYCODONE AND ACETAMINOPHEN 5; 325 MG/1; MG/1
1 TABLET ORAL ONCE
Qty: 0 | Refills: 0 | Status: DISCONTINUED | OUTPATIENT
Start: 2019-03-28 | End: 2019-03-28

## 2019-03-28 RX ORDER — ALLOPURINOL 300 MG
100 TABLET ORAL DAILY
Qty: 0 | Refills: 0 | Status: DISCONTINUED | OUTPATIENT
Start: 2019-03-28 | End: 2019-03-29

## 2019-03-28 RX ORDER — IPRATROPIUM/ALBUTEROL SULFATE 18-103MCG
3 AEROSOL WITH ADAPTER (GRAM) INHALATION ONCE
Qty: 0 | Refills: 0 | Status: COMPLETED | OUTPATIENT
Start: 2019-03-28 | End: 2019-03-28

## 2019-03-28 RX ORDER — TRAMADOL HYDROCHLORIDE 50 MG/1
25 TABLET ORAL EVERY 6 HOURS
Qty: 0 | Refills: 0 | Status: DISCONTINUED | OUTPATIENT
Start: 2019-03-28 | End: 2019-03-28

## 2019-03-28 RX ORDER — TRAMADOL HYDROCHLORIDE 50 MG/1
50 TABLET ORAL EVERY 6 HOURS
Qty: 0 | Refills: 0 | Status: DISCONTINUED | OUTPATIENT
Start: 2019-03-28 | End: 2019-03-29

## 2019-03-28 RX ADMIN — OXYCODONE AND ACETAMINOPHEN 1 TABLET(S): 5; 325 TABLET ORAL at 19:15

## 2019-03-28 RX ADMIN — Medication 1 TABLET(S): at 11:48

## 2019-03-28 RX ADMIN — Medication 1 PATCH: at 11:54

## 2019-03-28 RX ADMIN — Medication 3 MILLILITER(S): at 18:28

## 2019-03-28 RX ADMIN — Medication 25 MILLIGRAM(S): at 06:19

## 2019-03-28 RX ADMIN — Medication 0.6 MILLIGRAM(S): at 11:48

## 2019-03-28 RX ADMIN — Medication 100 MILLIGRAM(S): at 18:43

## 2019-03-28 RX ADMIN — Medication 25 MILLIGRAM(S): at 23:37

## 2019-03-28 RX ADMIN — SODIUM CHLORIDE 100 MILLILITER(S): 9 INJECTION INTRAMUSCULAR; INTRAVENOUS; SUBCUTANEOUS at 23:37

## 2019-03-28 RX ADMIN — Medication 2 CAPSULE(S): at 17:04

## 2019-03-28 RX ADMIN — Medication 1 PATCH: at 08:17

## 2019-03-28 RX ADMIN — AMLODIPINE BESYLATE 10 MILLIGRAM(S): 2.5 TABLET ORAL at 05:30

## 2019-03-28 RX ADMIN — TRAMADOL HYDROCHLORIDE 25 MILLIGRAM(S): 50 TABLET ORAL at 12:32

## 2019-03-28 RX ADMIN — MORPHINE SULFATE 2 MILLIGRAM(S): 50 CAPSULE, EXTENDED RELEASE ORAL at 03:22

## 2019-03-28 RX ADMIN — TRAMADOL HYDROCHLORIDE 50 MILLIGRAM(S): 50 TABLET ORAL at 23:37

## 2019-03-28 RX ADMIN — Medication 25 MILLIGRAM(S): at 12:28

## 2019-03-28 RX ADMIN — OXYCODONE AND ACETAMINOPHEN 1 TABLET(S): 5; 325 TABLET ORAL at 02:15

## 2019-03-28 RX ADMIN — MORPHINE SULFATE 2 MILLIGRAM(S): 50 CAPSULE, EXTENDED RELEASE ORAL at 09:30

## 2019-03-28 RX ADMIN — OXYCODONE AND ACETAMINOPHEN 1 TABLET(S): 5; 325 TABLET ORAL at 18:42

## 2019-03-28 RX ADMIN — TRAMADOL HYDROCHLORIDE 50 MILLIGRAM(S): 50 TABLET ORAL at 17:03

## 2019-03-28 RX ADMIN — Medication 2 CAPSULE(S): at 11:48

## 2019-03-28 RX ADMIN — Medication 100 MILLIGRAM(S): at 11:48

## 2019-03-28 RX ADMIN — TRAMADOL HYDROCHLORIDE 25 MILLIGRAM(S): 50 TABLET ORAL at 13:30

## 2019-03-28 RX ADMIN — OXYCODONE AND ACETAMINOPHEN 1 TABLET(S): 5; 325 TABLET ORAL at 01:40

## 2019-03-28 RX ADMIN — TRAMADOL HYDROCHLORIDE 50 MILLIGRAM(S): 50 TABLET ORAL at 17:55

## 2019-03-28 RX ADMIN — Medication 1 MILLIGRAM(S): at 11:48

## 2019-03-28 RX ADMIN — Medication 1 PATCH: at 11:48

## 2019-03-28 RX ADMIN — Medication 1 PATCH: at 19:15

## 2019-03-28 RX ADMIN — MORPHINE SULFATE 2 MILLIGRAM(S): 50 CAPSULE, EXTENDED RELEASE ORAL at 03:07

## 2019-03-28 RX ADMIN — MORPHINE SULFATE 2 MILLIGRAM(S): 50 CAPSULE, EXTENDED RELEASE ORAL at 08:33

## 2019-03-28 NOTE — PROGRESS NOTE ADULT - SUBJECTIVE AND OBJECTIVE BOX
58 year old man with no PMH (per him), former ETOH abuser presents to ED with complaint of abdominal pain with Back pain.  He is AAO x 3 but does not seem to be a reliable historian regarding PMH.  He does not know any medications (says "they all don't work, whatever they are, I don't take them").  In addition to his abdominal pain and Back pain he complains of nausea and vomiting.  He offers no other complaints.  In the ED, pt's h/h 6/21.7, BMP consistent with renal failure without baseline, K 2.4, supplemented IV in ED.  EKG NSR.  Lipase 1404, BNP 3818.  CT imaging consistent with chronic pancreatitis. (19 Mar 2019 21:52)  h/o prev treated hepc   today no hicupps but scratching himself more   has had itch x 1 year   no change in status   feels gout is better    Allergies    No Known Allergies    Intolerances        MEDICATIONS  (STANDING):  ALBUTerol/ipratropium for Nebulization 3 milliLiter(s) Nebulizer every 8 hours  allopurinol 100 milliGRAM(s) Oral daily  amLODIPine   Tablet 10 milliGRAM(s) Oral daily  buDESOnide 160 MICROgram(s)/formoterol 4.5 MICROgram(s) Inhaler 2 Puff(s) Inhalation two times a day  colchicine 0.6 milliGRAM(s) Oral daily  folic acid 1 milliGRAM(s) Oral daily  multivitamin/minerals 1 Tablet(s) Oral daily  nicotine -  14 mG/24Hr(s) Patch 1 patch Transdermal daily  pancrelipase  (CREON 36,000 Lipase Units) 2 Capsule(s) Oral three times a day with meals  sodium chloride 0.9%. 1000 milliLiter(s) (100 mL/Hr) IV Continuous <Continuous>  thiamine 100 milliGRAM(s) Oral daily    MEDICATIONS  (PRN):  LORazepam     Tablet 2 milliGRAM(s) Oral every 6 hours PRN Anxiety  ondansetron Injectable 8 milliGRAM(s) IV Push every 8 hours PRN Nausea and/or Vomiting  traMADol 50 milliGRAM(s) Oral every 6 hours PRN Moderate Pain (4 - 6)      REVIEW OF SYSTEMS:    hiccups   coughing a lot today   discussed with nursing by bedside     VITAL SIGNS:  T(C): 36.3 (03-28-19 @ 23:34), Max: 36.7 (03-28-19 @ 05:18)  T(F): 97.4 (03-28-19 @ 23:34), Max: 98 (03-28-19 @ 05:18)  HR: 89 (03-28-19 @ 23:34) (74 - 89)  BP: 160/81 (03-28-19 @ 23:34) (137/75 - 167/90)  RR: 18 (03-28-19 @ 23:34) (16 - 18)  SpO2: 94% (03-28-19 @ 23:34) (94% - 100%)  Wt(kg): --    PHYSICAL EXAM:    GENERAL: not in any distress  HEENT: Neck is supple, normocephalic, atraumatic   CHEST/LUNG: rhonchi to auscultation bilaterally;   HEART: Regular rate and rhythm; No murmurs, rubs, or gallops  ABDOMEN: Soft, Nontender, Nondistended; Bowel sounds present, no rebound   EXTREMITIES:  2+ Peripheral Pulses, No clubbing, cyanosis, or edema  SKIN: No rashes or lesions  BACK: no pressor sore   NERVOUS SYSTEM:  Alert & Oriented X3,extremities really no overt finding     LABS:                         9.5    9.49  )-----------( 442      ( 28 Mar 2019 08:02 )             32.2     03-28    143  |  116<H>  |  17  ----------------------------<  88  3.7   |  18<L>  |  1.69<H>    Ca    8.0<L>      28 Mar 2019 08:02    TPro  5.5<L>  /  Alb  1.9<L>  /  TBili  0.3  /  DBili  x   /  AST  20  /  ALT  14  /  AlkPhos  95  03-28    LIVER FUNCTIONS - ( 28 Mar 2019 08:02 )  Alb: 1.9 g/dL / Pro: 5.5 gm/dL / ALK PHOS: 95 U/L / ALT: 14 U/L / AST: 20 U/L / GGT: x                                   Culture Results:   No growth to date. (03-25 @ 09:30)  Culture Results:   No growth to date. (03-25 @ 09:30)  Culture Results:   >100,000 CFU/ml Enterococcus faecalis (03-23 @ 21:39)                Radiology:

## 2019-03-28 NOTE — PROGRESS NOTE ADULT - SUBJECTIVE AND OBJECTIVE BOX
Patient is a 58y old  Male who presents with a chief complaint of pancreatitis, anemia, abnormal renal fxn (28 Mar 2019 11:30)      OVERNIGHT EVENTS:   c/o abdominal pain and Back pain    REVIEW OF SYSTEMS: denies chest pain/SOB, diaphoresis, no F/C, cough, dizziness, headache, blurry vision, nausea, vomiting, abdominal pain. All others review of systems negative     MEDICATIONS  (STANDING):  ALBUTerol/ipratropium for Nebulization 3 milliLiter(s) Nebulizer every 8 hours  amLODIPine   Tablet 10 milliGRAM(s) Oral daily  buDESOnide 160 MICROgram(s)/formoterol 4.5 MICROgram(s) Inhaler 2 Puff(s) Inhalation two times a day  colchicine 0.6 milliGRAM(s) Oral daily  folic acid 1 milliGRAM(s) Oral daily  multivitamin/minerals 1 Tablet(s) Oral daily  nicotine -  14 mG/24Hr(s) Patch 1 patch Transdermal daily  pancrelipase  (CREON 36,000 Lipase Units) 2 Capsule(s) Oral three times a day with meals  sodium chloride 0.9%. 1000 milliLiter(s) (100 mL/Hr) IV Continuous <Continuous>  thiamine 100 milliGRAM(s) Oral daily    MEDICATIONS  (PRN):  LORazepam     Tablet 2 milliGRAM(s) Oral every 6 hours PRN Anxiety  ondansetron Injectable 8 milliGRAM(s) IV Push every 8 hours PRN Nausea and/or Vomiting  traMADol 50 milliGRAM(s) Oral every 6 hours PRN Moderate Pain (4 - 6)      Allergies    No Known Allergies    Intolerances        T(F): 98 (03-28-19 @ 10:38), Max: 98 (03-28-19 @ 05:18)  HR: 84 (03-28-19 @ 14:15) (74 - 91)  BP: 147/76 (03-28-19 @ 14:15) (137/75 - 171/84)  RR: 16 (03-28-19 @ 14:15) (16 - 18)  SpO2: 99% (03-28-19 @ 14:15) (98% - 100%)  Wt(kg): --    PHYSICAL EXAM:  GENERAL: NAD, well-groomed, well-developed  HEAD:  Atraumatic, Normocephalic  EYES: EOMI, PERRLA, conjunctiva and sclera clear  ENMT: No tonsillar erythema, exudates, or enlargement; Moist mucous membranes, Good dentition, No lesions  NECK: Supple, No JVD, Normal thyroid  NERVOUS SYSTEM:  Alert & Oriented X2, Good concentration; Motor Strength 5/5 B/L upper and lower extremities; DTRs 2+ intact and symmetric  CHEST/LUNG: Clear to percussion bilaterally; No rales, rhonchi, wheezing, or rubs BL  HEART: Regular rate and rhythm; No murmurs, rubs, or gallops  ABDOMEN: Soft, Nontender, Nondistended; Bowel sounds present  EXTREMITIES:  2+ Peripheral Pulses, No clubbing, cyanosis, or edema BL LE  LYMPH: No lymphadenopathy noted  SKIN: No rashes or lesions        LABS:                        9.5    9.49  )-----------( 442      ( 28 Mar 2019 08:02 )             32.2     03-28    143  |  116<H>  |  17  ----------------------------<  88  3.7   |  18<L>  |  1.69<H>    Ca    8.0<L>      28 Mar 2019 08:02    TPro  5.5<L>  /  Alb  1.9<L>  /  TBili  0.3  /  DBili  x   /  AST  20  /  ALT  14  /  AlkPhos  95  03-28        Culture - Urine (collected 23 Mar 2019 21:39)  Source: .Urine  Preliminary Report (24 Mar 2019 17:12):    >100,000 CFU/ml Gram positive organisms    Culture - Urine (collected 20 Mar 2019 10:40)  Source: .Urine  Final Report (21 Mar 2019 19:09):    <10,000 CFU/mL Normal Urogenital Mariah      RADIOLOGY & ADDITIONAL TESTS:    Imaging Personally Reviewed:  [x ] YES    < from: TTE Echo Doppler w/o Cont (03.21.19 @ 08:50) >   1. Left ventricular ejection fraction, by visual estimation, is 60 to   65%.   2. Normal global left ventricular systolic function.   3. There ismild concentric left ventricular hypertrophy.   4. Degenerative mitral valve.   5. Mild mitral annular calcification.   6. Mild mitral valve regurgitation.   7. Thickening and calcification of the anterior and posterior mitral   valve leaflets.   8. Mild tricuspid regurgitation.   9. Mild to moderate aortic regurgitation.  10. Trace pulmonic valve regurgitation.  11. The aortic valve mean gradient is 6.3 mmHg consistent with normally   opening aortic valve.      Consultant(s) Notes Reviewed:  [x ] YES     Care Discussed with [x ] Consultants [X ] Patient [ ] Family  [x ]    [x ]  Other; RN

## 2019-03-28 NOTE — PROGRESS NOTE ADULT - SUBJECTIVE AND OBJECTIVE BOX
Subjective: diffuse body pain.       MEDICATIONS  (STANDING):  ALBUTerol/ipratropium for Nebulization 3 milliLiter(s) Nebulizer every 8 hours  amLODIPine   Tablet 10 milliGRAM(s) Oral daily  buDESOnide 160 MICROgram(s)/formoterol 4.5 MICROgram(s) Inhaler 2 Puff(s) Inhalation two times a day  colchicine 0.6 milliGRAM(s) Oral daily  folic acid 1 milliGRAM(s) Oral daily  multivitamin/minerals 1 Tablet(s) Oral daily  nicotine -  14 mG/24Hr(s) Patch 1 patch Transdermal daily  pancrelipase  (CREON 36,000 Lipase Units) 2 Capsule(s) Oral three times a day with meals  sodium chloride 0.9%. 1000 milliLiter(s) (100 mL/Hr) IV Continuous <Continuous>  thiamine 100 milliGRAM(s) Oral daily    MEDICATIONS  (PRN):  LORazepam     Tablet 2 milliGRAM(s) Oral every 6 hours PRN Anxiety  morphine  - Injectable 2 milliGRAM(s) IV Push every 6 hours PRN Severe Pain (7 - 10)  ondansetron Injectable 8 milliGRAM(s) IV Push every 8 hours PRN Nausea and/or Vomiting          T(C): 36.7 (03-28-19 @ 10:38), Max: 36.7 (03-28-19 @ 05:18)  HR: 74 (03-28-19 @ 10:38) (74 - 91)  BP: 137/75 (03-28-19 @ 10:38) (137/75 - 171/84)  RR: 18 (03-28-19 @ 10:38) (17 - 18)  SpO2: 99% (03-28-19 @ 10:38) (98% - 100%)  Wt(kg): --        I&O's Detail    27 Mar 2019 07:01  -  28 Mar 2019 07:00  --------------------------------------------------------  IN:    Oral Fluid: 420 mL    sodium chloride 0.9%.: 1200 mL  Total IN: 1620 mL    OUT:    Voided: 600 mL  Total OUT: 600 mL    Total NET: 1020 mL               PHYSICAL EXAM:    GENERAL: anxious  EYES: EOMI, PERRLA, conjunctiva and sclera clear  NECK: Supple, no inc in JVP  CHEST/LUNG: Clear  HEART: S1S2  ABDOMEN: Soft, Nontender, Nondistended; Bowel sounds present  EXTREMITIES:  no edema  NEURO: no asterixis      LABS:  CBC Full  -  ( 28 Mar 2019 08:02 )  WBC Count : 9.49 K/uL  RBC Count : 3.85 M/uL  Hemoglobin : 9.5 g/dL  Hematocrit : 32.2 %  Platelet Count - Automated : 442 K/uL  Mean Cell Volume : 83.6 fl  Mean Cell Hemoglobin : 24.7 pg  Mean Cell Hemoglobin Concentration : 29.5 gm/dL  Auto Neutrophil # : x  Auto Lymphocyte # : x  Auto Monocyte # : x  Auto Eosinophil # : x  Auto Basophil # : x  Auto Neutrophil % : x  Auto Lymphocyte % : x  Auto Monocyte % : x  Auto Eosinophil % : x  Auto Basophil % : x    03-28    143  |  116<H>  |  17  ----------------------------<  88  3.7   |  18<L>  |  1.69<H>    Ca    8.0<L>      28 Mar 2019 08:02    TPro  5.5<L>  /  Alb  1.9<L>  /  TBili  0.3  /  DBili  x   /  AST  20  /  ALT  14  /  AlkPhos  95  03-28          Impression:  * YUNIEL -- peak Cr 4.8. Resolving on IVFs. Unremarkable CT of kidneys  * Acute pancreatitis.   * ? CKD  * Homeless.     Recommendations:   * Cont saline  * Follow serum Cr to radha

## 2019-03-28 NOTE — PROGRESS NOTE ADULT - PROBLEM SELECTOR PROBLEM 3
Delirium
Acute on chronic pancreatitis
Alcohol abuse
Delirium
Delirium
ETOH abuse
Hepatitis C antibody positive in blood

## 2019-03-28 NOTE — PROGRESS NOTE ADULT - PROBLEM SELECTOR PROBLEM 4
Acute on chronic pancreatitis
Anemia, unspecified type
Hepatitis C antibody positive in blood

## 2019-03-28 NOTE — PROGRESS NOTE ADULT - ASSESSMENT
58 year old man with no PMH (per him), former ETOH abuser presents to ED with complaint of abdominal pain, Back pain & n/v and was found to have Acute on chronic pancreatitis, YUNIEL & hypokalemia.    Acute on chronic pancreatitis  - CT showed numerous calcifications in the pancreatic head and pancreatic body w/ dilatation of the pancreatic duct within the pancreatic body and tail c/w  chronic pancreatitis  - lipase improved  - Analgesia PRN  - triglyceride WNL   - abd US showed a fatty liver w/ a dilatated main pancreatic duct and coarse calcifications within the pancreatic head compatible w/ chronic   pancreatitis    R knee gout   - on colchacine & watch CrCl  - procal 0.16  - UA negative    Hypokalemia & Hypophosphatemia  - resolved    Anemia due to poor intake   - Hgb was 6 on admission  - post 4 units pRBC, FOBT neg  - Retic Count, B12 and Folate are WNL  - iron panel showed inflammatory anemia     ETOH depedence  - c/w thiamine and add folic acid and MVN  - watch for withdrawal     YUNIEL   - suspect underlying CKD, but no baseline is available   - nephrology on board    HTN  - c/w Norvasc     HCV  - patient says he's aware that he has Hep C and was being treated as outpatient   - hep C RNA pending    Prophylaxis:   DVT: SCD  GI: PO diet

## 2019-03-28 NOTE — PHYSICAL THERAPY INITIAL EVALUATION ADULT - ADDITIONAL COMMENTS
Pt states he is homeless but stays in a large shelter where there are no stairs to negotiate. Pt states he typically performs all functional mobility including community ambulation with rollator independently. Pt states he performs his ADL's independently as well. Pt is right hand dominant. Pt requesting wheelchair stating shelter is quite large: pt instructed to reach out to case management/social work.

## 2019-03-28 NOTE — PROGRESS NOTE ADULT - SUBJECTIVE AND OBJECTIVE BOX
Gastroenterology  Patient seen and examined bedside resting comfortably.  No complaints offered.   No abdominal pain  +nausea and vomiting. Tolerating diet.  Normal flatus/BM.     T(F): 98 (03-28-19 @ 05:18), Max: 98 (03-28-19 @ 05:18)  HR: 87 (03-28-19 @ 05:18) (84 - 91)  BP: 167/90 (03-28-19 @ 05:18) (138/87 - 171/84)  RR: 17 (03-28-19 @ 05:18) (17 - 18)  SpO2: 98% (03-28-19 @ 05:18) (98% - 100%)  Wt(kg): --  CAPILLARY BLOOD GLUCOSE          PHYSICAL EXAM:  General: NAD, WDWN.   Neuro:  Alert & responsive  HEENT: NCAT, EOMI, conjunctiva clear  CV: +S1+S2 regular rate and rhythm  Lung: clear to ausculation bilaterally, respirations nonlabored, good inspiratory effort  Abdomen: soft, Non Tender, No distention Normal active BS  Extremities: no cyanosis, clubbing or edema    LABS:                        9.5    9.49  )-----------( 442      ( 28 Mar 2019 08:02 )             32.2     03-27    144  |  116<H>  |  20  ----------------------------<  89  3.7   |  17<L>  |  1.75<H>    Ca    8.2<L>      27 Mar 2019 07:28          I&O's Detail    27 Mar 2019 07:01  -  28 Mar 2019 07:00  --------------------------------------------------------  IN:    Oral Fluid: 420 mL    sodium chloride 0.9%.: 1200 mL  Total IN: 1620 mL    OUT:    Voided: 600 mL  Total OUT: 600 mL    Total NET: 1020 mL        03-27 @ 07:28    144 | 116 | 20  /8.2 | -- | --  _______________________/  3.7 | 17 | 1.75                           \par   Lipase, Serum: 862 U/L (03-27 @ 07:28)

## 2019-03-28 NOTE — PROGRESS NOTE ADULT - PROBLEM SELECTOR PLAN 4
Hydration per renal  Follow Serum amylase and lipase which are improved today  continue Creon with meals

## 2019-03-28 NOTE — PROGRESS NOTE ADULT - ASSESSMENT
leucocytosis ?? gout  resolved just on colchicine   NEGATIVE ua  no urinary signs and symptoms   urine e fecalis   need to treat ??   will not treat

## 2019-03-28 NOTE — PHYSICAL THERAPY INITIAL EVALUATION ADULT - PERTINENT HX OF CURRENT PROBLEM, REHAB EVAL
Pt is a 59yo M admitted with abdominal pain & back pain. Imaging/work-up revealed alcohol induced pancreatitis. GI following.

## 2019-03-29 ENCOUNTER — TRANSCRIPTION ENCOUNTER (OUTPATIENT)
Age: 59
End: 2019-03-29

## 2019-03-29 VITALS — OXYGEN SATURATION: 99 %

## 2019-03-29 DIAGNOSIS — K85.20 ALCOHOL INDUCED ACUTE PANCREATITIS WITHOUT NECROSIS OR INFECTION: ICD-10-CM

## 2019-03-29 LAB
ANION GAP SERPL CALC-SCNC: 9 MMOL/L — SIGNIFICANT CHANGE UP (ref 5–17)
BUN SERPL-MCNC: 15 MG/DL — SIGNIFICANT CHANGE UP (ref 7–23)
CALCIUM SERPL-MCNC: 8.2 MG/DL — LOW (ref 8.5–10.1)
CHLORIDE SERPL-SCNC: 114 MMOL/L — HIGH (ref 96–108)
CO2 SERPL-SCNC: 19 MMOL/L — LOW (ref 22–31)
CREAT SERPL-MCNC: 1.58 MG/DL — HIGH (ref 0.5–1.3)
GLUCOSE SERPL-MCNC: 81 MG/DL — SIGNIFICANT CHANGE UP (ref 70–99)
HCT VFR BLD CALC: 31.7 % — LOW (ref 39–50)
HGB BLD-MCNC: 9.4 G/DL — LOW (ref 13–17)
LIDOCAIN IGE QN: 490 U/L — HIGH (ref 73–393)
MCHC RBC-ENTMCNC: 25 PG — LOW (ref 27–34)
MCHC RBC-ENTMCNC: 29.7 GM/DL — LOW (ref 32–36)
MCV RBC AUTO: 84.3 FL — SIGNIFICANT CHANGE UP (ref 80–100)
NRBC # BLD: 0 /100 WBCS — SIGNIFICANT CHANGE UP (ref 0–0)
PLATELET # BLD AUTO: 516 K/UL — HIGH (ref 150–400)
POTASSIUM SERPL-MCNC: 3.5 MMOL/L — SIGNIFICANT CHANGE UP (ref 3.5–5.3)
POTASSIUM SERPL-SCNC: 3.5 MMOL/L — SIGNIFICANT CHANGE UP (ref 3.5–5.3)
RBC # BLD: 3.76 M/UL — LOW (ref 4.2–5.8)
RBC # FLD: 21.3 % — HIGH (ref 10.3–14.5)
SODIUM SERPL-SCNC: 142 MMOL/L — SIGNIFICANT CHANGE UP (ref 135–145)
WBC # BLD: 10.57 K/UL — HIGH (ref 3.8–10.5)
WBC # FLD AUTO: 10.57 K/UL — HIGH (ref 3.8–10.5)

## 2019-03-29 PROCEDURE — 99239 HOSP IP/OBS DSCHRG MGMT >30: CPT

## 2019-03-29 RX ORDER — ALLOPURINOL 300 MG
1 TABLET ORAL
Qty: 30 | Refills: 0
Start: 2019-03-29 | End: 2019-04-27

## 2019-03-29 RX ORDER — LIPASE/PROTEASE/AMYLASE 16-48-48K
2 CAPSULE,DELAYED RELEASE (ENTERIC COATED) ORAL
Qty: 180 | Refills: 0
Start: 2019-03-29 | End: 2019-04-27

## 2019-03-29 RX ORDER — FOLIC ACID 0.8 MG
1 TABLET ORAL
Qty: 0 | Refills: 0 | DISCHARGE
Start: 2019-03-29

## 2019-03-29 RX ORDER — AMLODIPINE BESYLATE 2.5 MG/1
1 TABLET ORAL
Qty: 30 | Refills: 0
Start: 2019-03-29 | End: 2019-04-27

## 2019-03-29 RX ORDER — MULTIVIT-MIN/FERROUS GLUCONATE 9 MG/15 ML
1 LIQUID (ML) ORAL
Qty: 0 | Refills: 0 | DISCHARGE
Start: 2019-03-29

## 2019-03-29 RX ORDER — THIAMINE MONONITRATE (VIT B1) 100 MG
1 TABLET ORAL
Qty: 0 | Refills: 0 | DISCHARGE
Start: 2019-03-29

## 2019-03-29 RX ORDER — NICOTINE POLACRILEX 2 MG
1 GUM BUCCAL
Qty: 30 | Refills: 0
Start: 2019-03-29 | End: 2019-04-27

## 2019-03-29 RX ORDER — COLCHICINE 0.6 MG
1 TABLET ORAL
Qty: 30 | Refills: 0
Start: 2019-03-29 | End: 2019-04-27

## 2019-03-29 RX ADMIN — TRAMADOL HYDROCHLORIDE 50 MILLIGRAM(S): 50 TABLET ORAL at 12:29

## 2019-03-29 RX ADMIN — Medication 0.6 MILLIGRAM(S): at 11:10

## 2019-03-29 RX ADMIN — AMLODIPINE BESYLATE 10 MILLIGRAM(S): 2.5 TABLET ORAL at 05:17

## 2019-03-29 RX ADMIN — Medication 1 MILLIGRAM(S): at 11:10

## 2019-03-29 RX ADMIN — TRAMADOL HYDROCHLORIDE 50 MILLIGRAM(S): 50 TABLET ORAL at 00:10

## 2019-03-29 RX ADMIN — SODIUM CHLORIDE 100 MILLILITER(S): 9 INJECTION INTRAMUSCULAR; INTRAVENOUS; SUBCUTANEOUS at 05:19

## 2019-03-29 RX ADMIN — Medication 3 MILLILITER(S): at 13:53

## 2019-03-29 RX ADMIN — TRAMADOL HYDROCHLORIDE 50 MILLIGRAM(S): 50 TABLET ORAL at 13:53

## 2019-03-29 RX ADMIN — Medication 1 PATCH: at 11:10

## 2019-03-29 RX ADMIN — Medication 100 MILLIGRAM(S): at 11:09

## 2019-03-29 RX ADMIN — TRAMADOL HYDROCHLORIDE 50 MILLIGRAM(S): 50 TABLET ORAL at 06:06

## 2019-03-29 RX ADMIN — Medication 1 TABLET(S): at 11:10

## 2019-03-29 NOTE — DISCHARGE NOTE PROVIDER - CARE PROVIDERS DIRECT ADDRESSES
[email or web address]

## 2019-03-29 NOTE — PROGRESS NOTE ADULT - PROBLEM SELECTOR PROBLEM 1
Alcohol abuse
Acute on chronic pancreatitis
Alcohol abuse
Anemia, unspecified type
Hepatitis C antibody positive in blood

## 2019-03-29 NOTE — DISCHARGE NOTE NURSING/CASE MANAGEMENT/SOCIAL WORK - NSDCDPATPORTLINK_GEN_ALL_CORE
You can access the [facility], offered by [facility], by registering with the following website: [email or web address]

## 2019-03-29 NOTE — PROGRESS NOTE ADULT - PROBLEM SELECTOR PLAN 2
resolved
sp treatment in 2016
CIWA monitoring  with Ativan prn  Thiamine and Folic acid daily.   ETOH abstinence advocated
CIWA monitoring  with Ativan prn  Thiamine and Folic acid daily.   ETOH abstinence advocated.
Check Iron, TIBC, Ferritin, Stool Guaiac.
Guaiac negative  UGI series ordered
sp treatment in 2016
sp treatment in 2016

## 2019-03-29 NOTE — DISCHARGE NOTE PROVIDER - NSDCCPCAREPLAN_GEN_ALL_CORE_FT
PRINCIPAL DISCHARGE DIAGNOSIS  Diagnosis: Alcohol-induced acute pancreatitis, unspecified complication status  Assessment and Plan of Treatment: stable      SECONDARY DISCHARGE DIAGNOSES  Diagnosis: Benign essential HTN  Assessment and Plan of Treatment: stable    Diagnosis: Acute gout  Assessment and Plan of Treatment: cont colchicine, allopurinol, follow up pmd    Diagnosis: Hypokalemia  Assessment and Plan of Treatment: replaced    Diagnosis: Acute renal failure, unspecified acute renal failure type  Assessment and Plan of Treatment: resolved

## 2019-03-29 NOTE — PROGRESS NOTE ADULT - PROBLEM SELECTOR PROBLEM 2
Alcohol-induced acute pancreatitis, unspecified complication status
Hepatitis C antibody positive in blood
Alcohol abuse
Anemia, unspecified type
ETOH abuse
Hepatitis C antibody positive in blood
Hepatitis C antibody positive in blood
Microcytic anemia
ETOH abuse

## 2019-03-29 NOTE — PROGRESS NOTE ADULT - REASON FOR ADMISSION
pancreatitis, anemia, abnormal renal fxn

## 2019-03-29 NOTE — DISCHARGE NOTE PROVIDER - CARE PROVIDER_API CALL
Mati Boggs)  Medicine  [address], [address]  [place] [address]  Phone: [phone]  Fax: [phone]  Follow Up Time:

## 2019-03-29 NOTE — PROGRESS NOTE ADULT - SUBJECTIVE AND OBJECTIVE BOX
Gastroenterology  Patient seen and examined bedside resting comfortably.  No complaints offered.   No abdominal pain  Denies nausea and vomiting. Tolerating diet.  Normal flatus/BM.     T(F): 99.4 (03-29-19 @ 12:55), Max: 99.4 (03-29-19 @ 12:55)  HR: 86 (03-29-19 @ 14:04) (78 - 89)  BP: 158/86 (03-29-19 @ 12:55) (131/89 - 160/81)  RR: 18 (03-29-19 @ 12:55) (18 - 18)  SpO2: 99% (03-29-19 @ 14:04) (94% - 99%)  Wt(kg): --  CAPILLARY BLOOD GLUCOSE          PHYSICAL EXAM:  General: NAD, WDWN.   Neuro:  Alert & responsive  HEENT: NCAT, EOMI, conjunctiva clear  CV: +S1+S2 regular rate and rhythm  Lung: clear to ausculation bilaterally, respirations nonlabored, good inspiratory effort  Abdomen: soft, Non Tender, No distention Normal active BS  Extremities: no cyanosis, clubbing or edema    LABS:                        9.4    10.57 )-----------( 516      ( 29 Mar 2019 08:28 )             31.7     03-29    142  |  114<H>  |  15  ----------------------------<  81  3.5   |  19<L>  |  1.58<H>    Ca    8.2<L>      29 Mar 2019 08:28    TPro  5.5<L>  /  Alb  1.9<L>  /  TBili  0.3  /  DBili  x   /  AST  20  /  ALT  14  /  AlkPhos  95  03-28    LIVER FUNCTIONS - ( 28 Mar 2019 08:02 )  Alb: 1.9 g/dL / Pro: 5.5 gm/dL / ALK PHOS: 95 U/L / ALT: 14 U/L / AST: 20 U/L / GGT: x             I&O's Detail    28 Mar 2019 07:01  -  29 Mar 2019 07:00  --------------------------------------------------------  IN:    Oral Fluid: 200 mL    sodium chloride 0.9%.: 1200 mL  Total IN: 1400 mL    OUT:    Voided: 700 mL  Total OUT: 700 mL    Total NET: 700 mL        03-29 @ 08:28    142 | 114 | 15  /8.2 | -- | --  _______________________/  3.5 | 19 | 1.58                           \par   Lipase, Serum: 490 U/L (03-29 @ 08:28)

## 2019-03-29 NOTE — DISCHARGE NOTE PROVIDER - HOSPITAL COURSE
58 year old man with no PMH (per him), former ETOH abuser presents to ED with complaint of abdominal pain with Back pain.  He is AAO x 3 but does not seem to be a reliable historian regarding PMH.  He does not know any medications (says "they all don't work, whatever they are, I don't take them").  In addition to his abdominal pain and Back pain he complains of nausea and vomiting.  He offers no other complaints.    In the ED, pt's h/h 6/21.7, BMP consistent with renal failure without baseline, K 2.4, supplemented IV in ED.  EKG NSR.  Lipase 1404, BNP 3818.  CT imaging consistent with chronic pancreatitis.     h/o prev treated hepc     Culture Results:     No growth to date. (03-25 @ 09:30)    Culture Results:     No growth to date. (03-25 @ 09:30)    Culture Results:     >100,000 CFU/ml Enterococcus faecalis (03-23 @ 21:39)    NEGATIVE ua    no urinary signs and symptoms     ID-no need to treat no clinical s/s    stable for d/c home and follow up w/pmd

## 2019-03-29 NOTE — PROGRESS NOTE ADULT - PROVIDER SPECIALTY LIST ADULT
Gastroenterology
Hospitalist
Infectious Disease
Nephrology
Hospitalist

## 2019-03-30 LAB
CULTURE RESULTS: SIGNIFICANT CHANGE UP
CULTURE RESULTS: SIGNIFICANT CHANGE UP
SPECIMEN SOURCE: SIGNIFICANT CHANGE UP
SPECIMEN SOURCE: SIGNIFICANT CHANGE UP

## 2019-04-03 DIAGNOSIS — F10.10 ALCOHOL ABUSE, UNCOMPLICATED: ICD-10-CM

## 2019-04-03 DIAGNOSIS — K76.0 FATTY (CHANGE OF) LIVER, NOT ELSEWHERE CLASSIFIED: ICD-10-CM

## 2019-04-03 DIAGNOSIS — B19.20 UNSPECIFIED VIRAL HEPATITIS C WITHOUT HEPATIC COMA: ICD-10-CM

## 2019-04-03 DIAGNOSIS — D72.829 ELEVATED WHITE BLOOD CELL COUNT, UNSPECIFIED: ICD-10-CM

## 2019-04-03 DIAGNOSIS — E87.6 HYPOKALEMIA: ICD-10-CM

## 2019-04-03 DIAGNOSIS — D50.9 IRON DEFICIENCY ANEMIA, UNSPECIFIED: ICD-10-CM

## 2019-04-03 DIAGNOSIS — K86.0 ALCOHOL-INDUCED CHRONIC PANCREATITIS: ICD-10-CM

## 2019-04-03 DIAGNOSIS — K85.20 ALCOHOL INDUCED ACUTE PANCREATITIS WITHOUT NECROSIS OR INFECTION: ICD-10-CM

## 2019-04-03 DIAGNOSIS — N18.9 CHRONIC KIDNEY DISEASE, UNSPECIFIED: ICD-10-CM

## 2019-04-03 DIAGNOSIS — I12.9 HYPERTENSIVE CHRONIC KIDNEY DISEASE WITH STAGE 1 THROUGH STAGE 4 CHRONIC KIDNEY DISEASE, OR UNSPECIFIED CHRONIC KIDNEY DISEASE: ICD-10-CM

## 2019-04-03 DIAGNOSIS — E83.39 OTHER DISORDERS OF PHOSPHORUS METABOLISM: ICD-10-CM

## 2019-04-03 DIAGNOSIS — R41.0 DISORIENTATION, UNSPECIFIED: ICD-10-CM

## 2019-04-03 DIAGNOSIS — N17.9 ACUTE KIDNEY FAILURE, UNSPECIFIED: ICD-10-CM

## 2019-04-03 DIAGNOSIS — M10.9 GOUT, UNSPECIFIED: ICD-10-CM

## 2019-04-03 DIAGNOSIS — E43 UNSPECIFIED SEVERE PROTEIN-CALORIE MALNUTRITION: ICD-10-CM

## 2019-04-06 LAB
GRAZOPREVIR RESISTANCE: SIGNIFICANT CHANGE UP
HCV NS3 MUT DET ISLT GENOTYP: SIGNIFICANT CHANGE UP
PARITAPREVIR RESISTANCE: SIGNIFICANT CHANGE UP
SIMPREVIR RESISTANCE: SIGNIFICANT CHANGE UP

## 2019-05-15 ENCOUNTER — INPATIENT (INPATIENT)
Facility: HOSPITAL | Age: 59
LOS: 36 days | Discharge: NURSING, REHAB OR LONG-TERM CARE | End: 2019-06-21
Attending: INTERNAL MEDICINE | Admitting: HOSPITALIST
Payer: MEDICAID

## 2019-05-15 ENCOUNTER — TRANSCRIPTION ENCOUNTER (OUTPATIENT)
Age: 59
End: 2019-05-15

## 2019-05-15 VITALS
HEART RATE: 88 BPM | DIASTOLIC BLOOD PRESSURE: 57 MMHG | OXYGEN SATURATION: 97 % | SYSTOLIC BLOOD PRESSURE: 101 MMHG | RESPIRATION RATE: 20 BRPM | WEIGHT: 160.06 LBS

## 2019-05-15 LAB
ALBUMIN SERPL ELPH-MCNC: 1.2 G/DL — LOW (ref 3.3–5)
ALP SERPL-CCNC: 132 U/L — HIGH (ref 40–120)
ALT FLD-CCNC: 12 U/L — SIGNIFICANT CHANGE UP (ref 12–78)
ANION GAP SERPL CALC-SCNC: 15 MMOL/L — SIGNIFICANT CHANGE UP (ref 5–17)
ANISOCYTOSIS BLD QL: SLIGHT — SIGNIFICANT CHANGE UP
APTT BLD: 32.5 SEC — SIGNIFICANT CHANGE UP (ref 27.5–36.3)
AST SERPL-CCNC: 26 U/L — SIGNIFICANT CHANGE UP (ref 15–37)
BASOPHILS # BLD AUTO: 0 K/UL — SIGNIFICANT CHANGE UP (ref 0–0.2)
BASOPHILS NFR BLD AUTO: 0 % — SIGNIFICANT CHANGE UP (ref 0–2)
BILIRUB SERPL-MCNC: 0.2 MG/DL — SIGNIFICANT CHANGE UP (ref 0.2–1.2)
BLD GP AB SCN SERPL QL: SIGNIFICANT CHANGE UP
BUN SERPL-MCNC: 61 MG/DL — HIGH (ref 7–23)
CALCIUM SERPL-MCNC: 10.1 MG/DL — SIGNIFICANT CHANGE UP (ref 8.5–10.1)
CHLORIDE SERPL-SCNC: 100 MMOL/L — SIGNIFICANT CHANGE UP (ref 96–108)
CK MB BLD-MCNC: 7.7 % — HIGH (ref 0–3.5)
CK MB CFR SERPL CALC: 1 NG/ML — SIGNIFICANT CHANGE UP (ref 0.5–3.6)
CK SERPL-CCNC: 13 U/L — LOW (ref 26–308)
CO2 SERPL-SCNC: 22 MMOL/L — SIGNIFICANT CHANGE UP (ref 22–31)
CREAT SERPL-MCNC: 4.06 MG/DL — HIGH (ref 0.5–1.3)
EOSINOPHIL # BLD AUTO: 0 K/UL — SIGNIFICANT CHANGE UP (ref 0–0.5)
EOSINOPHIL NFR BLD AUTO: 0 % — SIGNIFICANT CHANGE UP (ref 0–6)
GLUCOSE SERPL-MCNC: 84 MG/DL — SIGNIFICANT CHANGE UP (ref 70–99)
HCT VFR BLD CALC: 22.5 % — LOW (ref 39–50)
HGB BLD-MCNC: 7.6 G/DL — LOW (ref 13–17)
HYPOCHROMIA BLD QL: SIGNIFICANT CHANGE UP
INR BLD: 1.64 RATIO — HIGH (ref 0.88–1.16)
LACTATE SERPL-SCNC: 0.6 MMOL/L — LOW (ref 0.7–2)
LIDOCAIN IGE QN: 1173 U/L — HIGH (ref 73–393)
LYMPHOCYTES # BLD AUTO: 0.75 K/UL — LOW (ref 1–3.3)
LYMPHOCYTES # BLD AUTO: 3 % — LOW (ref 13–44)
MACROCYTES BLD QL: SLIGHT — SIGNIFICANT CHANGE UP
MAGNESIUM SERPL-MCNC: 2.6 MG/DL — SIGNIFICANT CHANGE UP (ref 1.6–2.6)
MANUAL SMEAR VERIFICATION: SIGNIFICANT CHANGE UP
MCHC RBC-ENTMCNC: 29.8 PG — SIGNIFICANT CHANGE UP (ref 27–34)
MCHC RBC-ENTMCNC: 33.8 GM/DL — SIGNIFICANT CHANGE UP (ref 32–36)
MCV RBC AUTO: 88.2 FL — SIGNIFICANT CHANGE UP (ref 80–100)
MONOCYTES # BLD AUTO: 1.01 K/UL — HIGH (ref 0–0.9)
MONOCYTES NFR BLD AUTO: 4 % — SIGNIFICANT CHANGE UP (ref 2–14)
NEUTROPHILS # BLD AUTO: 23.39 K/UL — HIGH (ref 1.8–7.4)
NEUTROPHILS NFR BLD AUTO: 80 % — HIGH (ref 43–77)
NEUTS BAND # BLD: 13 % — HIGH (ref 0–8)
NRBC # BLD: 0 /100 — SIGNIFICANT CHANGE UP (ref 0–0)
NRBC # BLD: SIGNIFICANT CHANGE UP /100 WBCS (ref 0–0)
PLAT MORPH BLD: NORMAL — SIGNIFICANT CHANGE UP
PLATELET # BLD AUTO: 474 K/UL — HIGH (ref 150–400)
POTASSIUM SERPL-MCNC: 2.6 MMOL/L — CRITICAL LOW (ref 3.5–5.3)
POTASSIUM SERPL-SCNC: 2.6 MMOL/L — CRITICAL LOW (ref 3.5–5.3)
PROT SERPL-MCNC: 5.1 GM/DL — LOW (ref 6–8.3)
PROTHROM AB SERPL-ACNC: 18.6 SEC — HIGH (ref 10–12.9)
RBC # BLD: 2.55 M/UL — LOW (ref 4.2–5.8)
RBC # FLD: 19.4 % — HIGH (ref 10.3–14.5)
RBC BLD AUTO: ABNORMAL
SODIUM SERPL-SCNC: 137 MMOL/L — SIGNIFICANT CHANGE UP (ref 135–145)
TARGETS BLD QL SMEAR: SLIGHT — SIGNIFICANT CHANGE UP
TROPONIN I SERPL-MCNC: <.015 NG/ML — SIGNIFICANT CHANGE UP (ref 0.01–0.04)
WBC # BLD: 25.15 K/UL — HIGH (ref 3.8–10.5)
WBC # FLD AUTO: 25.15 K/UL — HIGH (ref 3.8–10.5)

## 2019-05-15 PROCEDURE — 99285 EMERGENCY DEPT VISIT HI MDM: CPT

## 2019-05-15 PROCEDURE — 93010 ELECTROCARDIOGRAM REPORT: CPT

## 2019-05-15 RX ORDER — PIPERACILLIN AND TAZOBACTAM 4; .5 G/20ML; G/20ML
3.38 INJECTION, POWDER, LYOPHILIZED, FOR SOLUTION INTRAVENOUS ONCE
Refills: 0 | Status: COMPLETED | OUTPATIENT
Start: 2019-05-15 | End: 2019-05-16

## 2019-05-15 RX ORDER — PANTOPRAZOLE SODIUM 20 MG/1
8 TABLET, DELAYED RELEASE ORAL
Qty: 80 | Refills: 0 | Status: DISCONTINUED | OUTPATIENT
Start: 2019-05-15 | End: 2019-05-16

## 2019-05-15 RX ORDER — ONDANSETRON 8 MG/1
4 TABLET, FILM COATED ORAL ONCE
Refills: 0 | Status: COMPLETED | OUTPATIENT
Start: 2019-05-15 | End: 2019-05-15

## 2019-05-15 RX ORDER — PANTOPRAZOLE SODIUM 20 MG/1
80 TABLET, DELAYED RELEASE ORAL ONCE
Refills: 0 | Status: COMPLETED | OUTPATIENT
Start: 2019-05-15 | End: 2019-05-15

## 2019-05-15 RX ORDER — POTASSIUM CHLORIDE 20 MEQ
10 PACKET (EA) ORAL
Refills: 0 | Status: COMPLETED | OUTPATIENT
Start: 2019-05-15 | End: 2019-05-16

## 2019-05-15 RX ADMIN — ONDANSETRON 4 MILLIGRAM(S): 8 TABLET, FILM COATED ORAL at 22:01

## 2019-05-15 RX ADMIN — PANTOPRAZOLE SODIUM 10 MG/HR: 20 TABLET, DELAYED RELEASE ORAL at 22:00

## 2019-05-15 RX ADMIN — PANTOPRAZOLE SODIUM 80 MILLIGRAM(S): 20 TABLET, DELAYED RELEASE ORAL at 22:00

## 2019-05-15 NOTE — ED PROVIDER NOTE - OBJECTIVE STATEMENT
Pertinent PMH/PSH/FHx/SHx and Review of Systems contained within:    59yo M w PMH of EtOH abuse, pancreatitis presents to ED for eval of abd pain & vomiting x2d.  Pt also states he had a mechanical fall, while using his walker 1wk ago, fell backwards & hit his head & back.  Pt states he was not seen after fall. Pertinent PMH/PSH/FHx/SHx and Review of Systems contained within:    59yo M w PMH of EtOH abuse, pancreatitis presents to ED for eval of abd pain & vomiting x2d.  Pt also states he had a mechanical fall, while using his walker 1wk ago, fell backwards & hit his head & back.  Pt states he was not seen after fall Pertinent PMH/PSH/FHx/SHx and Review of Systems contained within:    59yo M w PMH of EtOH abuse, pancreatitis presents to ED for eval of abd pain & vomiting x2d.  Pt also states he had a mechanical fall, while using his walker 1wk ago, fell backwards & hit his head & back.  Pt states he was not seen after fall.  Pt currently c/o pain to abd & back.  Denies fever, chills, SOB, diarrhea.    No fever/chills, No photophobia/eye pain/changes in vision, No ear pain/sore throat/dysphagia, No chest pain/palpitations, no SOB/cough/wheeze/stridor, +abdominal pain, +back pain, no rash, no changes in neurological status/function.

## 2019-05-15 NOTE — ED ADULT TRIAGE NOTE - CHIEF COMPLAINT QUOTE
pt states, " I have chronic back pain and fell about 1 week ago , I have pain everywhere and I have been vomiting 2 days ago "

## 2019-05-15 NOTE — ED ADULT NURSE NOTE - OBJECTIVE STATEMENT
pt received to bed 14 c/o of falling while using a walker last week. pt states he cannot walk anymore, pt c/o generalized body pain. pt c/o n/v for three days now. pt is SOB, rapid respiration, breathing labored, taking many breathes to complete sentence, O2 sat 100, placed pt on 2L

## 2019-05-15 NOTE — ED PROVIDER NOTE - CARE PLAN
Principal Discharge DX:	Perforated viscus  Secondary Diagnosis:	Bandemia  Secondary Diagnosis:	Abdominal pain

## 2019-05-15 NOTE — ED ADULT NURSE NOTE - ED STAT RN HANDOFF DETAILS
Report endorsed to oncoming RN Zaira MEDINA Safety checks compld this shift/Safety rounds completed hourly.  IV sites checked Q2+remains WDL. Meds given as ord with no s/s of adverse RXNs. Fall +skin precs in place. Any issues endorsed to oncoming RN for follow up.

## 2019-05-15 NOTE — ED PROVIDER NOTE - PHYSICAL EXAMINATION
Gen: Alert, c/o pain  Head: NC, AT, EOMI, normal lids/conjunctiva  ENT: normal hearing, patent oropharynx, MMM  Neck: supple, no tenderness/meningismus, FROM, Trachea midline  Pulm: Bilateral clear BS, normal resp effort, no wheeze/stridor/retractions  CV: RRR, no M/R/G, +dist pulses  Abd: soft, +diffusely TTP, ND, +BS, +rebound tenderness  Mskel: no edema/erythema/cyanosis  Skin: no rash  Neuro: no sensory/motor deficits

## 2019-05-15 NOTE — ED ADULT NURSE NOTE - NSIMPLEMENTINTERV_GEN_ALL_ED
Implemented All Fall Risk Interventions:  [place] to call system. Call bell, personal items and telephone within reach. Instruct patient to call for assistance. Room bathroom lighting operational. Non-slip footwear when patient is off stretcher. Physically safe environment: no spills, clutter or unnecessary equipment. Stretcher in lowest position, wheels locked, appropriate side rails in place. Provide visual cue, wrist band, yellow gown, etc. Monitor gait and stability. Monitor for mental status changes and reorient to person, place, and time. Review medications for side effects contributing to fall risk. Reinforce activity limits and safety measures with patient and family.

## 2019-05-16 ENCOUNTER — RESULT REVIEW (OUTPATIENT)
Age: 59
End: 2019-05-16

## 2019-05-16 DIAGNOSIS — R19.8 OTHER SPECIFIED SYMPTOMS AND SIGNS INVOLVING THE DIGESTIVE SYSTEM AND ABDOMEN: ICD-10-CM

## 2019-05-16 DIAGNOSIS — K86.0 ALCOHOL-INDUCED CHRONIC PANCREATITIS: ICD-10-CM

## 2019-05-16 DIAGNOSIS — F10.10 ALCOHOL ABUSE, UNCOMPLICATED: ICD-10-CM

## 2019-05-16 DIAGNOSIS — Z29.9 ENCOUNTER FOR PROPHYLACTIC MEASURES, UNSPECIFIED: ICD-10-CM

## 2019-05-16 DIAGNOSIS — M10.9 GOUT, UNSPECIFIED: ICD-10-CM

## 2019-05-16 DIAGNOSIS — B19.20 UNSPECIFIED VIRAL HEPATITIS C WITHOUT HEPATIC COMA: ICD-10-CM

## 2019-05-16 LAB
ALBUMIN SERPL ELPH-MCNC: 1.2 G/DL — LOW (ref 3.3–5)
ALP SERPL-CCNC: 113 U/L — SIGNIFICANT CHANGE UP (ref 40–120)
ALT FLD-CCNC: 8 U/L — LOW (ref 12–78)
ANION GAP SERPL CALC-SCNC: 15 MMOL/L — SIGNIFICANT CHANGE UP (ref 5–17)
ANION GAP SERPL CALC-SCNC: 15 MMOL/L — SIGNIFICANT CHANGE UP (ref 5–17)
APPEARANCE UR: CLEAR — SIGNIFICANT CHANGE UP
APTT BLD: 32.9 SEC — SIGNIFICANT CHANGE UP (ref 28.5–37)
APTT BLD: 33.2 SEC — SIGNIFICANT CHANGE UP (ref 28.5–37)
AST SERPL-CCNC: 18 U/L — SIGNIFICANT CHANGE UP (ref 15–37)
BASE EXCESS BLDA CALC-SCNC: -2 MMOL/L — SIGNIFICANT CHANGE UP (ref -2–2)
BASOPHILS # BLD AUTO: 0.02 K/UL — SIGNIFICANT CHANGE UP (ref 0–0.2)
BASOPHILS NFR BLD AUTO: 0.1 % — SIGNIFICANT CHANGE UP (ref 0–2)
BILIRUB SERPL-MCNC: 0.2 MG/DL — SIGNIFICANT CHANGE UP (ref 0.2–1.2)
BILIRUB UR-MCNC: NEGATIVE — SIGNIFICANT CHANGE UP
BLOOD GAS COMMENTS: SIGNIFICANT CHANGE UP
BLOOD GAS COMMENTS: SIGNIFICANT CHANGE UP
BLOOD GAS SOURCE: SIGNIFICANT CHANGE UP
BUN SERPL-MCNC: 58 MG/DL — HIGH (ref 7–23)
BUN SERPL-MCNC: 59 MG/DL — HIGH (ref 7–23)
CALCIUM SERPL-MCNC: 8.9 MG/DL — SIGNIFICANT CHANGE UP (ref 8.5–10.1)
CALCIUM SERPL-MCNC: 8.9 MG/DL — SIGNIFICANT CHANGE UP (ref 8.5–10.1)
CHLORIDE SERPL-SCNC: 105 MMOL/L — SIGNIFICANT CHANGE UP (ref 96–108)
CHLORIDE SERPL-SCNC: 106 MMOL/L — SIGNIFICANT CHANGE UP (ref 96–108)
CO2 SERPL-SCNC: 18 MMOL/L — LOW (ref 22–31)
CO2 SERPL-SCNC: 20 MMOL/L — LOW (ref 22–31)
COLOR SPEC: YELLOW — SIGNIFICANT CHANGE UP
CREAT SERPL-MCNC: 3.77 MG/DL — HIGH (ref 0.5–1.3)
CREAT SERPL-MCNC: 3.78 MG/DL — HIGH (ref 0.5–1.3)
DIFF PNL FLD: ABNORMAL
EOSINOPHIL # BLD AUTO: 0 K/UL — SIGNIFICANT CHANGE UP (ref 0–0.5)
EOSINOPHIL NFR BLD AUTO: 0 % — SIGNIFICANT CHANGE UP (ref 0–6)
EPI CELLS # UR: SIGNIFICANT CHANGE UP
GLUCOSE BLDC GLUCOMTR-MCNC: 118 MG/DL — HIGH (ref 70–99)
GLUCOSE BLDC GLUCOMTR-MCNC: 128 MG/DL — HIGH (ref 70–99)
GLUCOSE BLDC GLUCOMTR-MCNC: 51 MG/DL — LOW (ref 70–99)
GLUCOSE BLDC GLUCOMTR-MCNC: 70 MG/DL — SIGNIFICANT CHANGE UP (ref 70–99)
GLUCOSE BLDC GLUCOMTR-MCNC: 78 MG/DL — SIGNIFICANT CHANGE UP (ref 70–99)
GLUCOSE BLDC GLUCOMTR-MCNC: 84 MG/DL — SIGNIFICANT CHANGE UP (ref 70–99)
GLUCOSE SERPL-MCNC: 53 MG/DL — LOW (ref 70–99)
GLUCOSE SERPL-MCNC: 80 MG/DL — SIGNIFICANT CHANGE UP (ref 70–99)
GLUCOSE UR QL: NEGATIVE MG/DL — SIGNIFICANT CHANGE UP
HCO3 BLDA-SCNC: 21 MMOL/L — SIGNIFICANT CHANGE UP (ref 21–29)
HCT VFR BLD CALC: 16.2 % — CRITICAL LOW (ref 39–50)
HCT VFR BLD CALC: 32 % — LOW (ref 39–50)
HCT VFR BLD CALC: 32.1 % — LOW (ref 39–50)
HGB BLD-MCNC: 10.7 G/DL — LOW (ref 13–17)
HGB BLD-MCNC: 10.8 G/DL — LOW (ref 13–17)
HGB BLD-MCNC: 5.5 G/DL — CRITICAL LOW (ref 13–17)
HOROWITZ INDEX BLDA+IHG-RTO: 100 — SIGNIFICANT CHANGE UP
IMM GRANULOCYTES NFR BLD AUTO: 1.2 % — SIGNIFICANT CHANGE UP (ref 0–1.5)
INR BLD: 1.37 RATIO — HIGH (ref 0.88–1.16)
INR BLD: 1.5 RATIO — HIGH (ref 0.88–1.16)
KETONES UR-MCNC: NEGATIVE — SIGNIFICANT CHANGE UP
LACTATE SERPL-SCNC: 0.7 MMOL/L — SIGNIFICANT CHANGE UP (ref 0.7–2)
LEUKOCYTE ESTERASE UR-ACNC: ABNORMAL
LYMPHOCYTES # BLD AUTO: 0.7 K/UL — LOW (ref 1–3.3)
LYMPHOCYTES # BLD AUTO: 4.3 % — LOW (ref 13–44)
MAGNESIUM SERPL-MCNC: 2.3 MG/DL — SIGNIFICANT CHANGE UP (ref 1.6–2.6)
MCHC RBC-ENTMCNC: 29.2 PG — SIGNIFICANT CHANGE UP (ref 27–34)
MCHC RBC-ENTMCNC: 29.2 PG — SIGNIFICANT CHANGE UP (ref 27–34)
MCHC RBC-ENTMCNC: 29.9 PG — SIGNIFICANT CHANGE UP (ref 27–34)
MCHC RBC-ENTMCNC: 33.4 GM/DL — SIGNIFICANT CHANGE UP (ref 32–36)
MCHC RBC-ENTMCNC: 33.6 GM/DL — SIGNIFICANT CHANGE UP (ref 32–36)
MCHC RBC-ENTMCNC: 34 GM/DL — SIGNIFICANT CHANGE UP (ref 32–36)
MCV RBC AUTO: 86.8 FL — SIGNIFICANT CHANGE UP (ref 80–100)
MCV RBC AUTO: 87.4 FL — SIGNIFICANT CHANGE UP (ref 80–100)
MCV RBC AUTO: 88 FL — SIGNIFICANT CHANGE UP (ref 80–100)
MONOCYTES # BLD AUTO: 0.7 K/UL — SIGNIFICANT CHANGE UP (ref 0–0.9)
MONOCYTES NFR BLD AUTO: 4.3 % — SIGNIFICANT CHANGE UP (ref 2–14)
NEUTROPHILS # BLD AUTO: 14.77 K/UL — HIGH (ref 1.8–7.4)
NEUTROPHILS NFR BLD AUTO: 90.1 % — HIGH (ref 43–77)
NITRITE UR-MCNC: NEGATIVE — SIGNIFICANT CHANGE UP
NRBC # BLD: 0 /100 WBCS — SIGNIFICANT CHANGE UP (ref 0–0)
PCO2 BLDA: 31 MMHG — LOW (ref 32–46)
PH BLD: 7.45 — SIGNIFICANT CHANGE UP (ref 7.35–7.45)
PH UR: 6 — SIGNIFICANT CHANGE UP (ref 5–8)
PHOSPHATE SERPL-MCNC: 3 MG/DL — SIGNIFICANT CHANGE UP (ref 2.5–4.5)
PLATELET # BLD AUTO: 342 K/UL — SIGNIFICANT CHANGE UP (ref 150–400)
PLATELET # BLD AUTO: 380 K/UL — SIGNIFICANT CHANGE UP (ref 150–400)
PLATELET # BLD AUTO: 445 K/UL — HIGH (ref 150–400)
PO2 BLDA: <500 MMHG — HIGH (ref 74–108)
POTASSIUM SERPL-MCNC: 2.7 MMOL/L — CRITICAL LOW (ref 3.5–5.3)
POTASSIUM SERPL-MCNC: 3.2 MMOL/L — LOW (ref 3.5–5.3)
POTASSIUM SERPL-SCNC: 2.7 MMOL/L — CRITICAL LOW (ref 3.5–5.3)
POTASSIUM SERPL-SCNC: 3.2 MMOL/L — LOW (ref 3.5–5.3)
PROT SERPL-MCNC: 4 GM/DL — LOW (ref 6–8.3)
PROT UR-MCNC: 30 MG/DL
PROTHROM AB SERPL-ACNC: 15.5 SEC — HIGH (ref 10–12.9)
PROTHROM AB SERPL-ACNC: 17 SEC — HIGH (ref 10–12.9)
RBC # BLD: 1.84 M/UL — LOW (ref 4.2–5.8)
RBC # BLD: 3.66 M/UL — LOW (ref 4.2–5.8)
RBC # BLD: 3.7 M/UL — LOW (ref 4.2–5.8)
RBC # FLD: 18.2 % — HIGH (ref 10.3–14.5)
RBC # FLD: 18.4 % — HIGH (ref 10.3–14.5)
RBC # FLD: 19.5 % — HIGH (ref 10.3–14.5)
RBC CASTS # UR COMP ASSIST: ABNORMAL /HPF (ref 0–4)
SAO2 % BLDA: 100 % — HIGH (ref 92–96)
SODIUM SERPL-SCNC: 139 MMOL/L — SIGNIFICANT CHANGE UP (ref 135–145)
SODIUM SERPL-SCNC: 140 MMOL/L — SIGNIFICANT CHANGE UP (ref 135–145)
SP GR SPEC: 1 — LOW (ref 1.01–1.02)
UROBILINOGEN FLD QL: NEGATIVE MG/DL — SIGNIFICANT CHANGE UP
WBC # BLD: 16.38 K/UL — HIGH (ref 3.8–10.5)
WBC # BLD: 22.16 K/UL — HIGH (ref 3.8–10.5)
WBC # BLD: 28.46 K/UL — HIGH (ref 3.8–10.5)
WBC # FLD AUTO: 16.38 K/UL — HIGH (ref 3.8–10.5)
WBC # FLD AUTO: 22.16 K/UL — HIGH (ref 3.8–10.5)
WBC # FLD AUTO: 28.46 K/UL — HIGH (ref 3.8–10.5)
WBC UR QL: SIGNIFICANT CHANGE UP

## 2019-05-16 PROCEDURE — 71045 X-RAY EXAM CHEST 1 VIEW: CPT | Mod: 26,77

## 2019-05-16 PROCEDURE — 88305 TISSUE EXAM BY PATHOLOGIST: CPT | Mod: 26

## 2019-05-16 PROCEDURE — 99291 CRITICAL CARE FIRST HOUR: CPT

## 2019-05-16 PROCEDURE — 71045 X-RAY EXAM CHEST 1 VIEW: CPT | Mod: 26

## 2019-05-16 PROCEDURE — 49905 OMENTAL FLAP INTRA-ABDOM: CPT | Mod: AS

## 2019-05-16 PROCEDURE — 49000 EXPLORATION OF ABDOMEN: CPT | Mod: 59

## 2019-05-16 PROCEDURE — 49000 EXPLORATION OF ABDOMEN: CPT | Mod: AS,59

## 2019-05-16 PROCEDURE — 74176 CT ABD & PELVIS W/O CONTRAST: CPT | Mod: 26

## 2019-05-16 PROCEDURE — 88312 SPECIAL STAINS GROUP 1: CPT | Mod: 26

## 2019-05-16 PROCEDURE — 49905 OMENTAL FLAP INTRA-ABDOM: CPT

## 2019-05-16 PROCEDURE — 99221 1ST HOSP IP/OBS SF/LOW 40: CPT | Mod: 57

## 2019-05-16 PROCEDURE — 99292 CRITICAL CARE ADDL 30 MIN: CPT

## 2019-05-16 PROCEDURE — 72125 CT NECK SPINE W/O DYE: CPT | Mod: 26

## 2019-05-16 PROCEDURE — 70450 CT HEAD/BRAIN W/O DYE: CPT | Mod: 26

## 2019-05-16 RX ORDER — ONDANSETRON 8 MG/1
4 TABLET, FILM COATED ORAL EVERY 6 HOURS
Refills: 0 | Status: DISCONTINUED | OUTPATIENT
Start: 2019-05-16 | End: 2019-05-16

## 2019-05-16 RX ORDER — FLUCONAZOLE 150 MG/1
100 TABLET ORAL EVERY 24 HOURS
Refills: 0 | Status: DISCONTINUED | OUTPATIENT
Start: 2019-05-16 | End: 2019-05-20

## 2019-05-16 RX ORDER — SODIUM CHLORIDE 9 MG/ML
1000 INJECTION, SOLUTION INTRAVENOUS ONCE
Refills: 0 | Status: COMPLETED | OUTPATIENT
Start: 2019-05-16 | End: 2019-05-16

## 2019-05-16 RX ORDER — DEXMEDETOMIDINE HYDROCHLORIDE IN 0.9% SODIUM CHLORIDE 4 UG/ML
0.4 INJECTION INTRAVENOUS
Qty: 200 | Refills: 0 | Status: DISCONTINUED | OUTPATIENT
Start: 2019-05-16 | End: 2019-05-17

## 2019-05-16 RX ORDER — DEXTROSE 50 % IN WATER 50 %
50 SYRINGE (ML) INTRAVENOUS ONCE
Refills: 0 | Status: COMPLETED | OUTPATIENT
Start: 2019-05-16 | End: 2019-05-16

## 2019-05-16 RX ORDER — POTASSIUM CHLORIDE 20 MEQ
40 PACKET (EA) ORAL ONCE
Refills: 0 | Status: COMPLETED | OUTPATIENT
Start: 2019-05-16 | End: 2019-05-16

## 2019-05-16 RX ORDER — MORPHINE SULFATE 50 MG/1
2 CAPSULE, EXTENDED RELEASE ORAL EVERY 4 HOURS
Refills: 0 | Status: DISCONTINUED | OUTPATIENT
Start: 2019-05-16 | End: 2019-05-16

## 2019-05-16 RX ORDER — FENTANYL CITRATE 50 UG/ML
50 INJECTION INTRAVENOUS EVERY 4 HOURS
Refills: 0 | Status: DISCONTINUED | OUTPATIENT
Start: 2019-05-16 | End: 2019-05-22

## 2019-05-16 RX ORDER — HEPARIN SODIUM 5000 [USP'U]/ML
5000 INJECTION INTRAVENOUS; SUBCUTANEOUS EVERY 12 HOURS
Refills: 0 | Status: DISCONTINUED | OUTPATIENT
Start: 2019-05-16 | End: 2019-05-16

## 2019-05-16 RX ORDER — PIPERACILLIN AND TAZOBACTAM 4; .5 G/20ML; G/20ML
3.38 INJECTION, POWDER, LYOPHILIZED, FOR SOLUTION INTRAVENOUS EVERY 12 HOURS
Refills: 0 | Status: DISCONTINUED | OUTPATIENT
Start: 2019-05-16 | End: 2019-05-20

## 2019-05-16 RX ORDER — ONDANSETRON 8 MG/1
4 TABLET, FILM COATED ORAL ONCE
Refills: 0 | Status: COMPLETED | OUTPATIENT
Start: 2019-05-16 | End: 2019-05-16

## 2019-05-16 RX ORDER — PANTOPRAZOLE SODIUM 20 MG/1
40 TABLET, DELAYED RELEASE ORAL EVERY 12 HOURS
Refills: 0 | Status: DISCONTINUED | OUTPATIENT
Start: 2019-05-16 | End: 2019-06-04

## 2019-05-16 RX ORDER — MORPHINE SULFATE 50 MG/1
4 CAPSULE, EXTENDED RELEASE ORAL ONCE
Refills: 0 | Status: DISCONTINUED | OUTPATIENT
Start: 2019-05-16 | End: 2019-05-16

## 2019-05-16 RX ORDER — CHLORHEXIDINE GLUCONATE 213 G/1000ML
1 SOLUTION TOPICAL
Refills: 0 | Status: DISCONTINUED | OUTPATIENT
Start: 2019-05-16 | End: 2019-06-04

## 2019-05-16 RX ORDER — CHLORHEXIDINE GLUCONATE 213 G/1000ML
1 SOLUTION TOPICAL DAILY
Refills: 0 | Status: DISCONTINUED | OUTPATIENT
Start: 2019-05-16 | End: 2019-05-16

## 2019-05-16 RX ORDER — NOREPINEPHRINE BITARTRATE/D5W 8 MG/250ML
0.05 PLASTIC BAG, INJECTION (ML) INTRAVENOUS
Qty: 16 | Refills: 0 | Status: DISCONTINUED | OUTPATIENT
Start: 2019-05-16 | End: 2019-05-18

## 2019-05-16 RX ORDER — POTASSIUM CHLORIDE 20 MEQ
20 PACKET (EA) ORAL ONCE
Refills: 0 | Status: COMPLETED | OUTPATIENT
Start: 2019-05-16 | End: 2019-05-16

## 2019-05-16 RX ADMIN — Medication 100 MILLIEQUIVALENT(S): at 03:43

## 2019-05-16 RX ADMIN — Medication 100 MILLIEQUIVALENT(S): at 08:11

## 2019-05-16 RX ADMIN — MORPHINE SULFATE 4 MILLIGRAM(S): 50 CAPSULE, EXTENDED RELEASE ORAL at 03:05

## 2019-05-16 RX ADMIN — Medication 100 MILLIEQUIVALENT(S): at 10:23

## 2019-05-16 RX ADMIN — ONDANSETRON 4 MILLIGRAM(S): 8 TABLET, FILM COATED ORAL at 03:13

## 2019-05-16 RX ADMIN — SODIUM CHLORIDE 2000 MILLILITER(S): 9 INJECTION, SOLUTION INTRAVENOUS at 13:12

## 2019-05-16 RX ADMIN — Medication 50 MILLILITER(S): at 10:16

## 2019-05-16 RX ADMIN — FLUCONAZOLE 50 MILLIGRAM(S): 150 TABLET ORAL at 08:13

## 2019-05-16 RX ADMIN — Medication 100 MILLIEQUIVALENT(S): at 07:35

## 2019-05-16 RX ADMIN — Medication 3.4 MICROGRAM(S)/KG/MIN: at 07:36

## 2019-05-16 RX ADMIN — Medication 40 MILLIEQUIVALENT(S): at 15:17

## 2019-05-16 RX ADMIN — FENTANYL CITRATE 50 MICROGRAM(S): 50 INJECTION INTRAVENOUS at 23:00

## 2019-05-16 RX ADMIN — SODIUM CHLORIDE 1000 MILLILITER(S): 9 INJECTION, SOLUTION INTRAVENOUS at 02:37

## 2019-05-16 RX ADMIN — SODIUM CHLORIDE 1000 MILLILITER(S): 9 INJECTION, SOLUTION INTRAVENOUS at 02:56

## 2019-05-16 RX ADMIN — CHLORHEXIDINE GLUCONATE 1 APPLICATION(S): 213 SOLUTION TOPICAL at 07:35

## 2019-05-16 RX ADMIN — FENTANYL CITRATE 50 MICROGRAM(S): 50 INJECTION INTRAVENOUS at 22:40

## 2019-05-16 RX ADMIN — PANTOPRAZOLE SODIUM 40 MILLIGRAM(S): 20 TABLET, DELAYED RELEASE ORAL at 17:43

## 2019-05-16 RX ADMIN — SODIUM CHLORIDE 1000 MILLILITER(S): 9 INJECTION, SOLUTION INTRAVENOUS at 06:55

## 2019-05-16 RX ADMIN — PIPERACILLIN AND TAZOBACTAM 25 GRAM(S): 4; .5 INJECTION, POWDER, LYOPHILIZED, FOR SOLUTION INTRAVENOUS at 17:43

## 2019-05-16 RX ADMIN — MORPHINE SULFATE 4 MILLIGRAM(S): 50 CAPSULE, EXTENDED RELEASE ORAL at 03:35

## 2019-05-16 RX ADMIN — Medication 50 MILLILITER(S): at 17:47

## 2019-05-16 NOTE — H&P ADULT - PROBLEM SELECTOR PLAN 1
-Admit  -Emergent OR after resuscitation.   -NPO, IV hydration, IV abx  -Pain management   -Discussed with Dr. Murray

## 2019-05-16 NOTE — H&P ADULT - NSHPLABSRESULTS_GEN_ALL_CORE
7.6    25.15 )-----------( 474      ( 15 May 2019 21:59 )             22.5   05-15    137  |  100  |  61<H>  ----------------------------<  84  2.6<LL>   |  22  |  4.06<H>    Ca    10.1      15 May 2019 21:59  Mg     2.6     05-15    TPro  5.1<L>  /  Alb  1.2<L>  /  TBili  0.2  /  DBili  x   /  AST  26  /  ALT  12  /  AlkPhos  132<H>  05-15      CT Abdomen and Pelvis No Cont (05.16.19 @ 00:21)   IMPRESSION:  Constellation of findings concerning for perforated viscus, which may be   gastric in origin, given associated 7.2 x 3.8 x 6.4 cm air and fluid   containing collection, inseparable from the gastric wall. Additional left   upper quadrant/subdiaphragmatic loculated collection.  Mild to moderate ascites with likely reactive thickening of the small   bowel.  Examination is limited without the administration of intravenous contrast   material.    CT Cervical Spine No Cont (05.16.19 @ 00:21)   IMPRESSION:   CT Head: No acute intracranial hemorrhage or calvarial fracture.  CT cervical spine: No acute cervical spine fracture or evidence of   traumatic malalignment.

## 2019-05-16 NOTE — H&P ADULT - ATTENDING COMMENTS
Very high risk patient with perforated viscus with sepsis and peritonitis with intraabdominal fluid.  He has decreased H and H and elevated INR most likely from sepsis. Will need emergent exploratory laparotomy to control the source and widely drain the patient. He is high risk for morbidity and mortality at this point. Given this, all risk, benefit, alternative was discussed with the patient and he is agreeable to proceed.

## 2019-05-16 NOTE — CONSULT NOTE ADULT - ASSESSMENT
57 y/o male PMHx ETOH abuse, chronic pancreatitis, treated hepatitis C, gout c/o severe lower abdominal pain x 3 days found to have perforated viscus with free air in abdomen, s/p Ex lap with stomach perforation yusef patch repair,   -admit to critical care  resp: intubated on mech vent  abg, cxr to be ordered  SBT daily    cardio: keep MAP >65  has right subclavian TLC  rigth radial a line      GI: perforated stomach s/p ex lap with yusef patch repair with 3 CINDY drains  monitor output  protonix     ID: peritonitis with perf stmoach  continue zosyna nd diflucan    Heme: hemoglobin was 7.2, may need transfusion  INR 1.6 received FFP prior OR    Gen:  repeat all labs  full code  CIWA protocol    DVT prophylaxis: Heparin SC tonight to start

## 2019-05-16 NOTE — CONSULT NOTE ADULT - ATTENDING COMMENTS
58M PMH alcohol abuse, chronic pancreatitis, Hep C, gout, + smoker presents for abdominal pain, nausea, and emesis x3 days. Pt was recently hospitalized in [place] (2 months ago) for abdominal pain and back pain (for which he was taking advil at home) treated for acute on chronic pancreatitis, YUNIEL (pre renal azotemia, NSAID AIN) with course complicated by right knee gouty flare. Presents again this time with abdominal pain/n/v. Admitted to surgical service for sepsis, acute renal failure, peritonitis due to perforated viscous. Taken to OR s/p ex lap, abdominal washout for purulent peritonitis, yusef patch for gastric perforation. Transferred to ICU post op intubated, septic on pressors. Post op pt with anemia Hb 5.5, hypokalemia, hypoglycemia.    1. NEURO  - daily sedation vacation  - will need to monitor for signs of withdrawal once off sedation    2. CV  - shock state likely septic shock from perforated viscous with purulent peritonitis  - hypotensive on levophed, s/p a total of 5L IVF (had 1L in ICU post op, another L given this morning, and reportedly 3L in OR)  - maintain MAP >65    3. PULM  - on mechanical ventilation  - daily weaning as tolerates  - hold on wean today as pt with hemodynamic instability and critically ill    4. GI  - POD #0  - s/p ex lap abdominal wash, out, stomach biospy, yusef patch repair of perforated stomach  - npo  - NGT to intermittent suction  - IVF  - 3 abdominal CINDY drain, monitor output, currently with serous sanguinous drainage  - surgical follow up    5. RENAL  - acute on chronic renal failure  - pt was discharged with Cr1.58, returns with Cr 4.06  - chen  - monitor urine output  - likely ARF on CKD with ATN in setting of sepsis  - renal consult if no improvement in renal function or if UO continues to decline. was seen by Dr Dukes on last admission  - hypokalemia K 2.7, supplement hypokalemia    6. ID  - sepsis from perforated viscous and purulent peritonitis  - follow up abdominal culture but will check blood cx, urine cx, sputum cx  - zosyn for gram negative coverage and diflucan for antifungal coverage  - monitor leukocytosis and temperature curve  - currently hypothermic on warming blankets/marc hugger    7. HEME  - acute post operative anemia on chronic anemia  - transfuse 3 units pRBC  - post transfusion CBC with Hb 10.7 up from 5.5  - will monitor CINDY drains for bleeding  - trend H/H    8. ENDO  - hypoglycemia: s/p 1 amp D50  - monitor FS q4 hours while NPO  - if blood sugar continues to be low start D5NS background IVF     9. GEN  - pt is critically ill will remain in ICU  - no family at bedside    Critical Care Time: 90 min

## 2019-05-16 NOTE — H&P ADULT - NSHPPHYSICALEXAM_GEN_ALL_CORE
PHYSICAL EXAM:  GENERAL: NAD, well-developed  HEAD:  Atraumatic, Normocephalic  EYES: Conjunctiva and sclera clear  ENMT: No tonsillar erythema, exudates, or enlargement; Moist mucous membranes, No lesions  NECK: Supple, No JVD, Normal thyroid  NERVOUS SYSTEM:  Alert & Oriented X3  CHEST/LUNG: Clear to auscultation bilaterally; No rales, rhonchi, wheezing  HEART: Regular rate and rhythm. S1S2  ABDOMEN: Nondistended, hypoactive BS, soft, lower abdominal tenderness, no guarding, no rigidity   EXTREMITIES:  2+ Peripheral Pulses, No clubbing, cyanosis, or edema

## 2019-05-16 NOTE — PROGRESS NOTE ADULT - SUBJECTIVE AND OBJECTIVE BOX
Postoperative Day #: 0  Patient seen and examined bedside in CCU.   On levophed, LR 1L bolus was given this afternoon.    T(F): 97 (05-16-19 @ 12:45), Max: 99.2 (05-15-19 @ 18:29)  HR: 118 (05-16-19 @ 13:45) (69 - 118)  BP: 94/57 (05-16-19 @ 08:00) (94/57 - 129/61)  RR: 15 (05-16-19 @ 13:45) (14 - 44)  SpO2: 100% (05-16-19 @ 13:45) (90% - 100%)  Wt(kg): --  CAPILLARY BLOOD GLUCOSE    POCT Blood Glucose.: 118 mg/dL (16 May 2019 10:17)  POCT Blood Glucose.: 70 mg/dL (16 May 2019 09:36)  POCT Blood Glucose.: 51 mg/dL (16 May 2019 09:34)      PHYSICAL EXAM:  General: unresponsive  HEENT: orally intubated, NGT with  scant dark brown drainage in tubing, no recordable output.  CV: +S1+S2 regular rate and rhythm  Lung: ETT to ventilator  Abdomen: soft, midline surgical dressing CDI. CINDY drains x 3 with s/s output: L perisplenic 30cc, L anterior stomach 80cc, R duodenal gutter 100cc since OR  Extremities: SCD b/l LE  : 3 way chen catheter indwelling with light yellow urine output, 175cc since OR    LABS:                        10.7   28.46 )-----------( 445      ( 16 May 2019 13:27 )             32.0     05-16    139  |  106  |  58<H>  ----------------------------<  80  3.2<L>   |  18<L>  |  3.78<H>    Ca    8.9      16 May 2019 13:27  Phos  3.0     05-16  Mg     2.3     05-16    TPro  4.0<L>  /  Alb  1.2<L>  /  TBili  0.2  /  DBili  x   /  AST  18  /  ALT  8<L>  /  AlkPhos  113  05-16    PT/INR - ( 16 May 2019 07:31 )   PT: 17.0 sec;   INR: 1.50 ratio    PTT - ( 16 May 2019 07:31 )  PTT:33.2 sec      Impression: 58y Male admitted with PERFORATED VISCUS,  POD#0 s/p ex lap, yusef patch repair of gastric perforation, with peritoneal and stomach biopsies  PMH Gout, Hepatitis C, Pancreatitis, ETOH abuse    Plan:  -continue critical care and vent/pressor support  -continue diflucan, zosyn, protonix BID.   -continue I/O monitoring and hydration, NGT decompression  -discussed with Dr Murray Postoperative Day #: 0  Patient seen and examined bedside in CCU.   On levophed, LR 1L bolus was given this afternoon.    T(F): 97 (05-16-19 @ 12:45), Max: 99.2 (05-15-19 @ 18:29)  HR: 118 (05-16-19 @ 13:45) (69 - 118)  BP: 94/57 (05-16-19 @ 08:00) (94/57 - 129/61)  RR: 15 (05-16-19 @ 13:45) (14 - 44)  SpO2: 100% (05-16-19 @ 13:45) (90% - 100%)  Wt(kg): --  CAPILLARY BLOOD GLUCOSE    POCT Blood Glucose.: 118 mg/dL (16 May 2019 10:17)  POCT Blood Glucose.: 70 mg/dL (16 May 2019 09:36)  POCT Blood Glucose.: 51 mg/dL (16 May 2019 09:34)      PHYSICAL EXAM:  General: unresponsive  HEENT: orally intubated, NGT with  scant dark brown drainage in tubing, no recordable output.  CV: +S1+S2 regular rate and rhythm  Lung: ETT to ventilator  Abdomen: soft, midline surgical dressing CDI. CINDY drains x 3 with s/s output: L perisplenic 30cc, L anterior stomach 80cc, R duodenal gutter 100cc since OR  Extremities: SCD b/l LE  : 3 way chen catheter indwelling with light yellow urine output, 175cc since OR    LABS:                        10.7   28.46 )-----------( 445      ( 16 May 2019 13:27 )             32.0     05-16    139  |  106  |  58<H>  ----------------------------<  80  3.2<L>   |  18<L>  |  3.78<H>    Ca    8.9      16 May 2019 13:27  Phos  3.0     05-16  Mg     2.3     05-16    TPro  4.0<L>  /  Alb  1.2<L>  /  TBili  0.2  /  DBili  x   /  AST  18  /  ALT  8<L>  /  AlkPhos  113  05-16    PT/INR - ( 16 May 2019 07:31 )   PT: 17.0 sec;   INR: 1.50 ratio    PTT - ( 16 May 2019 07:31 )  PTT:33.2 sec      Impression: 58y Male admitted with PERFORATED VISCUS, purulent peritonitis  POD#0 s/p ex lap, yusef patch repair of gastric perforation, with peritoneal and stomach biopsies  PMH Gout, Hepatitis C, Pancreatitis, ETOH abuse    Plan:  -continue critical care and vent/pressor support  -continue diflucan, zosyn, protonix BID.   -continue I/O monitoring and hydration, NGT decompression  -discussed with Dr Murray

## 2019-05-16 NOTE — BRIEF OPERATIVE NOTE - NSICDXBRIEFPROCEDURE_GEN_ALL_CORE_FT
PROCEDURES:  Peritoneal lavage 16-May-2019 06:40:11  Layne Salas  Omental flap 16-May-2019 06:39:43  Layne Salas  Exploratory laparotomy 16-May-2019 06:36:14  Layne Salas PROCEDURES:  Stomach biopsy 16-May-2019 06:44:26  Layne Salas  Peritoneal lavage 16-May-2019 06:40:11  Layne Salas  Omental flap 16-May-2019 06:39:43  Layne Salas  Exploratory laparotomy 16-May-2019 06:36:14  Layne Salas

## 2019-05-16 NOTE — CONSULT NOTE ADULT - SUBJECTIVE AND OBJECTIVE BOX
Patient is a 58y old  Male who presents with a chief complaint of Abdominal pain, vomiting (16 May 2019 02:18)      59 y/o male PMHx ETOH abuse, chronic pancreatitis, treated hepatitis C, gout c/o severe lower abdominal pain x 3 days. Pain worsened today. Pain is associated with nausea and multiple episodes of clear vomiting. Patient took 2 aspirins which did not relieve the pain. Last BM was 3 days ago. Last flatus was today. Patient denies fever, chills, constipation, diarrhea, hematuria, melena, hematochezia, chest pain, shortness of breath, dizziness. Patient denies prior incident. (16 May 2019 02:18).    Pt found to have free air in the abdomen on CT scan, patient went to OR s/p ex lap found with perforated stomach, and pus, intra-op on levophed, and received 2L of IVF, with urine output of 50ml and EBL of around 50ml.  Post-op patient kept intubated and transferred to critical care for further care.      PAST MEDICAL & SURGICAL HISTORY:  Gout  Hepatitis C: treated  Pancreatitis  ETOH abuse  No significant past surgical history    FAMILY HISTORY:  No pertinent family history in first degree relatives    Social History: Allergies    No Known Allergies    Intolerances        MEDICATIONS  (STANDING):  potassium chloride  10 mEq/100 mL IVPB 10 milliEquivalent(s) IV Intermittent every 1 hour    MEDICATIONS  (PRN):      REVIEW OF SYSTEMS:    unable to obtain, patient sedated on MV      PHYSICAL EXAM:    T(C): 36.8 (16 May 2019 05:25), Max: 37.3 (15 May 2019 18:29)  T(F): 98.3 (16 May 2019 05:25), Max: 99.2 (15 May 2019 18:29)  HR: 87 (16 May 2019 05:25) (86 - 114)  BP: 117/58 (16 May 2019 05:25) (101/57 - 129/61)  RR: 18 (16 May 2019 05:25) (17 - 44)  SpO2: 98% (16 May 2019 05:25) (96% - 100%)    GENERAL: vented  HEAD:  Atraumatic, Normocephalic  EYES: EOMI, PERRLA, conjunctiva and sclera clear  NECK: Supple, No JVD, Normal thyroid  NERVOUS SYSTEM:  sedated  CHEST/LUNG: CTAbilaterally; No rales, rhonchi, wheezing, or rubs  HEART: Regular rate and rhythm; No murmurs, rubs, or gallops  ABDOMEN: 3JP drain, dressing clean and dry  EXTREMITIES:  2+ Peripheral Pulses, No clubbing, cyanosis, or edema  SKIN: No rashes or lesions        LABS:                        7.6    25.15 )-----------( 474      ( 15 May 2019 21:59 )             22.5     05-15    137  |  100  |  61<H>  ----------------------------<  84  2.6<LL>   |  22  |  4.06<H>    Ca    10.1      15 May 2019 21:59  Mg     2.6     05-15    TPro  5.1<L>  /  Alb  1.2<L>  /  TBili  0.2  /  DBili  x   /  AST  26  /  ALT  12  /  AlkPhos  132<H>  05-15    PT/INR - ( 15 May 2019 21:59 )   PT: 18.6 sec;   INR: 1.64 ratio         PTT - ( 15 May 2019 21:59 )  PTT:32.5 sec          RADIOLOGY & ADDITIONAL STUDIES:  < from: CT Abdomen and Pelvis No Cont (05.16.19 @ 00:21) >  Constellation of findings concerning for perforated viscus, which may be   gastric in origin, given associated 7.2 x 3.8 x 6.4 cm air and fluid   containing collection, inseparable from the gastric wall. Additional left   upper quadrant/subdiaphragmatic loculated collection.    Mild to moderate ascites with likely reactive thickening of the small   bowel.

## 2019-05-16 NOTE — H&P ADULT - HISTORY OF PRESENT ILLNESS
57 y/o male PMHx ETOH abuse, chronic pancreatitis, treated hepatitis C, gout c/o severe lower abdominal pain x 3 days. Pain worsened today. Pain is associated with nausea and multiple episodes of clear vomiting. Patient took 2 aspirins which did not relieve the pain. Last BM was 3 days ago. Last flatus was today. Patient denies fever, chills, constipation, diarrhea, hematuria, melena, hematochezia, chest pain, shortness of breath, dizziness. Patient denies prior incident.

## 2019-05-16 NOTE — H&P ADULT - ASSESSMENT
57 y/o male PMHx ETOH abuse, chronic pancreatitis, treated hepatitis C, gout c/o severe lower abdominal pain x 3 days. Admitted with perforated viscus.

## 2019-05-17 LAB
ALBUMIN SERPL ELPH-MCNC: 1.1 G/DL — LOW (ref 3.3–5)
ALP SERPL-CCNC: 152 U/L — HIGH (ref 40–120)
ALT FLD-CCNC: 10 U/L — LOW (ref 12–78)
AMMONIA BLD-MCNC: 51 UMOL/L — HIGH (ref 11–32)
ANION GAP SERPL CALC-SCNC: 14 MMOL/L — SIGNIFICANT CHANGE UP (ref 5–17)
AST SERPL-CCNC: 14 U/L — LOW (ref 15–37)
BILIRUB DIRECT SERPL-MCNC: 0.07 MG/DL — SIGNIFICANT CHANGE UP (ref 0.05–0.2)
BILIRUB INDIRECT FLD-MCNC: 0.1 MG/DL — LOW (ref 0.2–1)
BILIRUB SERPL-MCNC: 0.2 MG/DL — SIGNIFICANT CHANGE UP (ref 0.2–1.2)
BUN SERPL-MCNC: 62 MG/DL — HIGH (ref 7–23)
CALCIUM SERPL-MCNC: 8 MG/DL — LOW (ref 8.5–10.1)
CHLORIDE SERPL-SCNC: 106 MMOL/L — SIGNIFICANT CHANGE UP (ref 96–108)
CO2 SERPL-SCNC: 18 MMOL/L — LOW (ref 22–31)
CREAT SERPL-MCNC: 3.98 MG/DL — HIGH (ref 0.5–1.3)
CULTURE RESULTS: NO GROWTH — SIGNIFICANT CHANGE UP
GLUCOSE BLDC GLUCOMTR-MCNC: 105 MG/DL — HIGH (ref 70–99)
GLUCOSE BLDC GLUCOMTR-MCNC: 114 MG/DL — HIGH (ref 70–99)
GLUCOSE BLDC GLUCOMTR-MCNC: 90 MG/DL — SIGNIFICANT CHANGE UP (ref 70–99)
GLUCOSE SERPL-MCNC: 79 MG/DL — SIGNIFICANT CHANGE UP (ref 70–99)
GRAM STN FLD: SIGNIFICANT CHANGE UP
HCT VFR BLD CALC: 31.7 % — LOW (ref 39–50)
HGB BLD-MCNC: 10.7 G/DL — LOW (ref 13–17)
MAGNESIUM SERPL-MCNC: 2.4 MG/DL — SIGNIFICANT CHANGE UP (ref 1.6–2.6)
MCHC RBC-ENTMCNC: 29.2 PG — SIGNIFICANT CHANGE UP (ref 27–34)
MCHC RBC-ENTMCNC: 33.8 GM/DL — SIGNIFICANT CHANGE UP (ref 32–36)
MCV RBC AUTO: 86.4 FL — SIGNIFICANT CHANGE UP (ref 80–100)
METHOD TYPE: SIGNIFICANT CHANGE UP
MSSA DNA SPEC QL NAA+PROBE: SIGNIFICANT CHANGE UP
NRBC # BLD: 0 /100 WBCS — SIGNIFICANT CHANGE UP (ref 0–0)
PHOSPHATE SERPL-MCNC: 1.5 MG/DL — LOW (ref 2.5–4.5)
PLATELET # BLD AUTO: 413 K/UL — HIGH (ref 150–400)
POTASSIUM SERPL-MCNC: 3.2 MMOL/L — LOW (ref 3.5–5.3)
POTASSIUM SERPL-SCNC: 3.2 MMOL/L — LOW (ref 3.5–5.3)
PROT SERPL-MCNC: 4.8 GM/DL — LOW (ref 6–8.3)
RBC # BLD: 3.67 M/UL — LOW (ref 4.2–5.8)
RBC # FLD: 18.8 % — HIGH (ref 10.3–14.5)
SODIUM SERPL-SCNC: 138 MMOL/L — SIGNIFICANT CHANGE UP (ref 135–145)
SPECIMEN SOURCE: SIGNIFICANT CHANGE UP
WBC # BLD: 21.46 K/UL — HIGH (ref 3.8–10.5)
WBC # FLD AUTO: 21.46 K/UL — HIGH (ref 3.8–10.5)

## 2019-05-17 PROCEDURE — 71045 X-RAY EXAM CHEST 1 VIEW: CPT | Mod: 26,77

## 2019-05-17 PROCEDURE — 99291 CRITICAL CARE FIRST HOUR: CPT

## 2019-05-17 PROCEDURE — 71045 X-RAY EXAM CHEST 1 VIEW: CPT | Mod: 26

## 2019-05-17 RX ORDER — VANCOMYCIN HCL 1 G
750 VIAL (EA) INTRAVENOUS ONCE
Refills: 0 | Status: COMPLETED | OUTPATIENT
Start: 2019-05-17 | End: 2019-05-17

## 2019-05-17 RX ORDER — POTASSIUM PHOSPHATE, MONOBASIC POTASSIUM PHOSPHATE, DIBASIC 236; 224 MG/ML; MG/ML
15 INJECTION, SOLUTION INTRAVENOUS ONCE
Refills: 0 | Status: COMPLETED | OUTPATIENT
Start: 2019-05-17 | End: 2019-05-17

## 2019-05-17 RX ORDER — POTASSIUM CHLORIDE 20 MEQ
10 PACKET (EA) ORAL ONCE
Refills: 0 | Status: COMPLETED | OUTPATIENT
Start: 2019-05-17 | End: 2019-05-17

## 2019-05-17 RX ORDER — SODIUM CHLORIDE 9 MG/ML
1000 INJECTION, SOLUTION INTRAVENOUS
Refills: 0 | Status: DISCONTINUED | OUTPATIENT
Start: 2019-05-17 | End: 2019-05-17

## 2019-05-17 RX ORDER — SODIUM CHLORIDE 9 MG/ML
1000 INJECTION, SOLUTION INTRAVENOUS
Refills: 0 | Status: COMPLETED | OUTPATIENT
Start: 2019-05-17 | End: 2019-05-17

## 2019-05-17 RX ORDER — SODIUM CHLORIDE 9 MG/ML
1000 INJECTION, SOLUTION INTRAVENOUS
Refills: 0 | Status: DISCONTINUED | OUTPATIENT
Start: 2019-05-17 | End: 2019-05-23

## 2019-05-17 RX ADMIN — DEXMEDETOMIDINE HYDROCHLORIDE IN 0.9% SODIUM CHLORIDE 6.27 MICROGRAM(S)/KG/HR: 4 INJECTION INTRAVENOUS at 04:30

## 2019-05-17 RX ADMIN — PANTOPRAZOLE SODIUM 40 MILLIGRAM(S): 20 TABLET, DELAYED RELEASE ORAL at 05:03

## 2019-05-17 RX ADMIN — FENTANYL CITRATE 50 MICROGRAM(S): 50 INJECTION INTRAVENOUS at 17:08

## 2019-05-17 RX ADMIN — Medication 100 MILLIEQUIVALENT(S): at 06:46

## 2019-05-17 RX ADMIN — Medication 3.4 MICROGRAM(S)/KG/MIN: at 09:27

## 2019-05-17 RX ADMIN — SODIUM CHLORIDE 75 MILLILITER(S): 9 INJECTION, SOLUTION INTRAVENOUS at 02:00

## 2019-05-17 RX ADMIN — Medication 1 MILLIGRAM(S): at 23:02

## 2019-05-17 RX ADMIN — PIPERACILLIN AND TAZOBACTAM 25 GRAM(S): 4; .5 INJECTION, POWDER, LYOPHILIZED, FOR SOLUTION INTRAVENOUS at 05:00

## 2019-05-17 RX ADMIN — PIPERACILLIN AND TAZOBACTAM 25 GRAM(S): 4; .5 INJECTION, POWDER, LYOPHILIZED, FOR SOLUTION INTRAVENOUS at 18:20

## 2019-05-17 RX ADMIN — FENTANYL CITRATE 50 MICROGRAM(S): 50 INJECTION INTRAVENOUS at 13:30

## 2019-05-17 RX ADMIN — FENTANYL CITRATE 50 MICROGRAM(S): 50 INJECTION INTRAVENOUS at 04:56

## 2019-05-17 RX ADMIN — Medication 250 MILLIGRAM(S): at 17:08

## 2019-05-17 RX ADMIN — FENTANYL CITRATE 50 MICROGRAM(S): 50 INJECTION INTRAVENOUS at 21:06

## 2019-05-17 RX ADMIN — DEXMEDETOMIDINE HYDROCHLORIDE IN 0.9% SODIUM CHLORIDE 6.27 MICROGRAM(S)/KG/HR: 4 INJECTION INTRAVENOUS at 09:28

## 2019-05-17 RX ADMIN — FENTANYL CITRATE 50 MICROGRAM(S): 50 INJECTION INTRAVENOUS at 05:26

## 2019-05-17 RX ADMIN — SODIUM CHLORIDE 100 MILLILITER(S): 9 INJECTION, SOLUTION INTRAVENOUS at 18:19

## 2019-05-17 RX ADMIN — Medication 1 MILLIGRAM(S): at 18:19

## 2019-05-17 RX ADMIN — FENTANYL CITRATE 50 MICROGRAM(S): 50 INJECTION INTRAVENOUS at 21:02

## 2019-05-17 RX ADMIN — FENTANYL CITRATE 50 MICROGRAM(S): 50 INJECTION INTRAVENOUS at 17:30

## 2019-05-17 RX ADMIN — POTASSIUM PHOSPHATE, MONOBASIC POTASSIUM PHOSPHATE, DIBASIC 62.5 MILLIMOLE(S): 236; 224 INJECTION, SOLUTION INTRAVENOUS at 09:26

## 2019-05-17 RX ADMIN — FLUCONAZOLE 50 MILLIGRAM(S): 150 TABLET ORAL at 06:46

## 2019-05-17 RX ADMIN — PANTOPRAZOLE SODIUM 40 MILLIGRAM(S): 20 TABLET, DELAYED RELEASE ORAL at 18:19

## 2019-05-17 RX ADMIN — FENTANYL CITRATE 50 MICROGRAM(S): 50 INJECTION INTRAVENOUS at 12:54

## 2019-05-17 RX ADMIN — SODIUM CHLORIDE 75 MILLILITER(S): 9 INJECTION, SOLUTION INTRAVENOUS at 18:19

## 2019-05-17 RX ADMIN — CHLORHEXIDINE GLUCONATE 1 APPLICATION(S): 213 SOLUTION TOPICAL at 06:46

## 2019-05-17 NOTE — CHART NOTE - NSCHARTNOTEFT_GEN_A_CORE
Upon Nutritional Assessment by the Registered Dietitian your patient was determined to meet criteria / has evidence of the following diagnosis/diagnoses:          [ ]  Mild Protein Calorie Malnutrition        [ ]  Moderate Protein Calorie Malnutrition        [x ] Severe Protein Calorie Malnutrition        [ ] Unspecified Protein Calorie Malnutrition        [x ] Underweight / BMI <19        [ ] Morbid Obesity / BMI > 40      Findings as based on:  •  Comprehensive nutrition assessment and consultation  •  Calorie counts (nutrient intake analysis)  •  Food acceptance and intake status from observations by staff  •  Follow up  •  Patient education  •  Intervention secondary to interdisciplinary rounds  •   concerns      Treatment:    The following diet has been recommended:  adv po diet when medically feasible Clear liquids to full liquids to Soft & Ensure Enlive 2 x day(700kcal & 40gm protein)    PROVIDER Section:     By signing this assessment you are acknowledging and agree with the diagnosis/diagnoses assigned by the Registered Dietitian    Comments:

## 2019-05-17 NOTE — DIETITIAN INITIAL EVALUATION ADULT. - PERTINENT MEDS FT
chlorhexidine 4% Liquid  dexmedetomidine Infusion  dextrose 5% + lactated ringers.  fentaNYL    Injectable PRN  fluconAZOLE IVPB  LORazepam   Injectable  norepinephrine Infusion  pantoprazole  Injectable  piperacillin/tazobactam IVPB.

## 2019-05-17 NOTE — OCCUPATIONAL THERAPY INITIAL EVALUATION ADULT - ADDITIONAL COMMENTS
Patient lives in homeless shelter with no stairs to enter. Patient was independent with basic ADls and functional mobility with rollator prior to admission.

## 2019-05-17 NOTE — DIETITIAN INITIAL EVALUATION ADULT. - PHYSICAL APPEARANCE
underweight/BMI=18.2; +1 gen edema; Nutrition focused physical exam conducted; Subcutaneous fat loss: [moderate ] Orbital fat pads region, [moderate ]Buccal fat region, [severe ]Triceps region,  [severe ]Ribs region.  Muscle wasting: [moderate ]Temples region, [moderate ]Clavicle region, [moderate ]Shoulder region, [unable ]Scapula region, [edematous ]Interosseous region,  [severe ]thigh region, [ severe]Calf region

## 2019-05-17 NOTE — PROGRESS NOTE ADULT - SUBJECTIVE AND OBJECTIVE BOX
HPI:  Pt is a 57 yo BM active smoker with h/o ETOH abuse, chronic pancreatitis, Hep C and gout. Pt was recently hospitalized in March 2 to abdominal pain and back pain and Rx'd for acute on chronic pancreatitis, YUNIEL  (pre renal azotemia, NSAID AIN). Hospital course was complicated by R knee gouty flare. Pt now presents with abdominal pain and N/V. Pt was admitted to surgical service for sepsis, YUNIEL,  peritonitis due to perforated viscous. Pt taken to OR s/p ex lap, abdominal washout for purulent peritonitis, David patch for gastric perforation. Admitting dx: 1) Septic shock 2 to perforated viscus 2) Acute blood loss anemia 3) ? acute on CKD      ## Labs:  CBC:                        10.7   21.46 )-----------( 413      ( 17 May 2019 03:20 )             31.7     Chem:  05-17    138  |  106  |  62<H>  ----------------------------<  79  3.2<L>   |  18<L>  |  3.98<H>    Ca    8.0<L>      17 May 2019 03:20  Phos  1.5     05-17  Mg     2.4     05-17    TPro  4.8<L>  /  Alb  1.1<L>  /  TBili  0.2  /  DBili  .07  /  AST  14<L>  /  ALT  10<L>  /  AlkPhos  152<H>  05-17    Coags:  PT/INR - ( 16 May 2019 18:33 )   PT: 15.5 sec;   INR: 1.37 ratio         PTT - ( 16 May 2019 18:33 )  PTT:32.9 sec  culture blood:  --  05-16 @ 18:36            culture sputum:     Growth in anaerobic bottle: Gram Positive Cocci in Clusters  Growth in aerobic bottle: Gram Positive Cocci in Clusters  "Due to technical problems, Proteus sp. will Not be reported as part of  the BCID panel until further notice"  ***Blood Panel PCR results on this specimen are available  approximately 3 hours after the Gram stain result.***  Gram stain, PCR, and/or culture results may not always  correspond due to difference in methodologies.  ************************************************************  This PCR assay was performed using [employer].  The following targets are tested for: Enterococcus,  vancomycin resistant enterococci, Listeria monocytogenes,  coagulase negative staphylococci, S. aureus,  methicillin resistant S. aureus, Streptococcus agalactiae  (Group B), S. pneumoniae, S. pyogenes (Group A),  Acinetobacter baumannii, Enterobacter cloacae, E. coli,  Klebsiella oxytoca, K. pneumoniae, Proteus sp.,  Serratia marcescens, Haemophilus influenzae,  Neisseria meningitidis, Pseudomonas aeruginosa, Candida  albicans, C. glabrata, C krusei, C parapsilosis,  C. tropicalis and the KPC resistance gene.           culture urine:  -- 05-16 @ 18:36  culture blood:  --  05-16 @ 17:20            culture sputum:     No growth           culture urine:  -- 05-16 @ 17:20  culture blood:  --  05-16 @ 09:25            culture sputum:     Rare Staphylococcus aureus Susceptibility to follow.           culture urine:  -- 05-16 @ 09:25        ## Imaging:    ## Medications:  fluconAZOLE IVPB 100 milliGRAM(s) IV Intermittent every 24 hours  piperacillin/tazobactam IVPB. 3.375 Gram(s) IV Intermittent every 12 hours    norepinephrine Infusion 0.05 MICROgram(s)/kG/Min IV Continuous <Continuous>          pantoprazole  Injectable 40 milliGRAM(s) IV Push every 12 hours    dexmedetomidine Infusion 0.4 MICROgram(s)/kG/Hr IV Continuous <Continuous>  fentaNYL    Injectable 50 MICROGram(s) IV Push every 4 hours PRN  LORazepam   Injectable 1 milliGRAM(s) IV Push every 6 hours      ## Vitals:  T(C): 37.3 (05-17-19 @ 07:30), Max: 37.3 (05-17-19 @ 07:30)  HR: 70 (05-17-19 @ 13:00) (62 - 118)  BP: 107/60 (05-17-19 @ 13:00) (107/60 - 115/61)  BP(mean): 71 (05-17-19 @ 13:00) (71 - 78)  RR: 15 (05-17-19 @ 13:00) (13 - 27)  SpO2: 100% (05-17-19 @ 13:00) (93% - 100%)  Wt(kg): --  Vent: Mode: CPAP with PS, RR (patient): 15, FiO2: 35, PEEP: 5, PS: 5, PIP: 10  ABG: ABG - ( 16 May 2019 07:00 )  pH, Arterial: x     pH, Blood: 7.45  /  pCO2: 31    /  pO2: <500  / HCO3: 21    / Base Excess: -2.0  /  SaO2: 100                   05-16 @ 07:01  -  05-17 @ 07:00  --------------------------------------------------------  IN: 2845.6 mL / OUT: 610 mL / NET: 2235.6 mL    05-17 @ 07:01  - 05-17 @ 13:05  --------------------------------------------------------  IN: 438.9 mL / OUT: 0 mL / NET: 438.9 mL          ## P/E:  Gen: lying comfortably in bed in no apparent distress  Mouth: (+) ETT  Lungs: CTA  Heart: RRR  Abd: Soft/+BS/ R CINDY/ L CINDY x2  Ext: No edema  Neuro: Following commands    CENTRAL LINE: [ ] YES [ ] NO  LOCATION:   DATE INSERTED:  REMOVE: [ ] YES [ ] NO      GARCIA: [x ] YES [ ] NO    DATE INSERTED:  REMOVE:  [ ] YES [ ] NO      A-LINE:  [ ] YES [ ] NO  LOCATION:   DATE INSERTED:  REMOVE:  [ ] YES [ ] NO  EXPLAIN:    CODE STATUS: [ x] full code  [ ] DNR  [ ] DNI  [ ] [facility]  Goals of care discussion: [ ] yes

## 2019-05-17 NOTE — OCCUPATIONAL THERAPY INITIAL EVALUATION ADULT - GENERAL OBSERVATIONS, REHAB EVAL
Pt was encountered supine in bed in CCU; NAD, AXOX1, POD 1 s/p ex lap, abdominal washout for purulent peritonitis, David patch for gastric perforation. CINDY drains, chen/catheter, A line, supplemental O2 via nasal cannula, IV present. Family at bed side.

## 2019-05-17 NOTE — PHYSICAL THERAPY INITIAL EVALUATION ADULT - ADDITIONAL COMMENTS
As per EMR as not pt is confused and poor historian: Pt states he is homeless but stays in a large shelter where there are no stairs to negotiate. Pt states he typically performs all functional mobility including community ambulation with rollator independently. Pt states he performs his ADL's independently as well. Pt is right hand dominant.

## 2019-05-17 NOTE — PROGRESS NOTE ADULT - SUBJECTIVE AND OBJECTIVE BOX
SURGERY PROGRESS HPI:  Pt seen and examined at bedside resting comfortably, intubated. Nods his head to answer questions. Pain is well controlled on pain medication. C/o mild nausea. Denies vomiting. No BM/flatus. Pt denies chest pain, SOB, dizziness, fever, chills.     Vital Signs Last 24 Hrs  T(C): 36.5 (16 May 2019 23:24), Max: 36.8 (16 May 2019 15:20)  T(F): 97.7 (16 May 2019 23:24), Max: 98.3 (16 May 2019 15:20)  HR: 71 (17 May 2019 03:30) (69 - 118)  BP: 109/68 (16 May 2019 23:12) (94/57 - 115/61)  BP(mean): 78 (16 May 2019 23:12) (64 - 78)  RR: 15 (17 May 2019 03:30) (14 - 27)  SpO2: 100% (17 May 2019 03:30) (90% - 100%)      PHYSICAL EXAM:  General: NAD, alert, nods his head to answer questions, follows commands.  HEENT: orally intubated, NGT with scant brown drainage in tubing  CV: +S1+S2 regular rate and rhythm  Lung: ETT to ventilator  Abdomen: Midline surgical dressing CDI. Dressing and packing removed. Staples intact. Wound irrigated with NS. New iodoform packing and dressing placed. Patient tolerated well. CINDY drains x 3: L perisplenic serous 30cc, L anterior stomach s/s 80cc, R duodenal gutter s/s 100cc. Nondistended, soft, hypoactive bowel sounds, appropriate incisional tenderness.  Extremities: SCD b/l LE  : 3 way chen catheter indwelling with light yellow urine output    I&O's Detail    15 May 2019 07:  -  16 May 2019 07:00  --------------------------------------------------------  IN:    Packed Red Blood Cells: 336 mL  Total IN: 336 mL    OUT:    Drain: 50 mL    Drain: 30 mL    Drain: 10 mL    Indwelling Catheter - Urethral: 25 mL  Total OUT: 115 mL    Total NET: 221 mL      16 May 2019 07:01  -  17 May 2019 05:29  --------------------------------------------------------  IN:    dexmedetomidine Infusion: 18.8 mL    Enteral Tube Flush: 120 mL    IV PiggyBack: 300 mL    Lactated Ringers IV Bolus: 1000 mL    norepinephrine Infusion: 159.3 mL    Packed Red Blood Cells: 627 mL    Solution: 150 mL  Total IN: 2375.1 mL    OUT:    Drain: 60 mL    Drain: 50 mL    Drain: 70 mL    Indwelling Catheter - Urethral: 320 mL  Total OUT: 500 mL    Total NET: 1875.1 mL          LABS:                        10.7   21.46 )-----------( 413      ( 17 May 2019 03:20 )             31.7         138  |  106  |  62<H>  ----------------------------<  79  3.2<L>   |  18<L>  |  3.98<H>    Ca    8.0<L>      17 May 2019 03:20  Phos  1.5       Mg     2.4         TPro  4.8<L>  /  Alb  1.1<L>  /  TBili  0.2  /  DBili  .07  /  AST  14<L>  /  ALT  10<L>  /  AlkPhos  152<H>      PT/INR - ( 16 May 2019 18:33 )   PT: 15.5 sec;   INR: 1.37 ratio         PTT - ( 16 May 2019 18:33 )  PTT:32.9 sec  Urinalysis Basic - ( 16 May 2019 11:01 )    Color: Yellow / Appearance: Clear / S.005 / pH: x  Gluc: x / Ketone: Negative  / Bili: Negative / Urobili: Negative mg/dL   Blood: x / Protein: 30 mg/dL / Nitrite: Negative   Leuk Esterase: Trace / RBC: 6-10 /HPF / WBC 3-5   Sq Epi: x / Non Sq Epi: Few / Bacteria: x        Impression: 58y Male admitted with PERFORATED VISCUS, purulent peritonitis  POD#1 s/p ex lap, yusef patch repair of gastric perforation, with peritoneal and stomach biopsies  PMH Gout, Hepatitis C, Pancreatitis, ETOH abuse  3uPRBC and 1uFFP . Currently intubated on levophed    Plan:  -continue critical care and vent/pressor support. extubated per CCU  -continue diflucan, zosyn, protonix BID.   -continue I/O monitoring and hydration, NGT decompression  -wound care per surgical team  -monitor CINDY output  -will discuss pt with surgical attending SURGERY PROGRESS HPI:  Pt seen and examined at bedside resting comfortably, intubated. Nods his head to answer questions. Pain is well controlled on pain medication. C/o mild nausea. Denies vomiting. No BM/flatus. Pt denies chest pain, SOB, dizziness, fever, chills.     Vital Signs Last 24 Hrs  T(C): 36.5 (16 May 2019 23:24), Max: 36.8 (16 May 2019 15:20)  T(F): 97.7 (16 May 2019 23:24), Max: 98.3 (16 May 2019 15:20)  HR: 71 (17 May 2019 03:30) (69 - 118)  BP: 109/68 (16 May 2019 23:12) (94/57 - 115/61)  BP(mean): 78 (16 May 2019 23:12) (64 - 78)  RR: 15 (17 May 2019 03:30) (14 - 27)  SpO2: 100% (17 May 2019 03:30) (90% - 100%)      PHYSICAL EXAM:  General: NAD, alert, nods his head to answer questions, follows commands.  HEENT: orally intubated, NGT with scant brown drainage in tubing  CV: +S1+S2 regular rate and rhythm  Lung: ETT to ventilator  Abdomen: Midline surgical dressing CDI. Dressing and packing removed. Staples intact. Wound irrigated with NS. New iodoform packing and dressing placed. Patient tolerated well. CINDY drains x 3: L perisplenic serous 50cc/24hrs, L anterior stomach s/s 70cc/24hrs, R duodenal gutter s/s 60cc/24hrs. Nondistended, soft, hypoactive bowel sounds, appropriate incisional tenderness.  Extremities: SCD b/l LE  : 3 way chen catheter indwelling with light yellow urine output    I&O's Detail    15 May 2019 07:01  -  16 May 2019 07:00  --------------------------------------------------------  IN:    Packed Red Blood Cells: 336 mL  Total IN: 336 mL    OUT:    Drain: 50 mL    Drain: 30 mL    Drain: 10 mL    Indwelling Catheter - Urethral: 25 mL  Total OUT: 115 mL    Total NET: 221 mL      16 May 2019 07:01  -  17 May 2019 05:29  --------------------------------------------------------  IN:    dexmedetomidine Infusion: 18.8 mL    Enteral Tube Flush: 120 mL    IV PiggyBack: 300 mL    Lactated Ringers IV Bolus: 1000 mL    norepinephrine Infusion: 159.3 mL    Packed Red Blood Cells: 627 mL    Solution: 150 mL  Total IN: 2375.1 mL    OUT:    Drain: 60 mL    Drain: 50 mL    Drain: 70 mL    Indwelling Catheter - Urethral: 320 mL  Total OUT: 500 mL    Total NET: 1875.1 mL          LABS:                        10.7   21.46 )-----------( 413      ( 17 May 2019 03:20 )             31.7         138  |  106  |  62<H>  ----------------------------<  79  3.2<L>   |  18<L>  |  3.98<H>    Ca    8.0<L>      17 May 2019 03:20  Phos  1.5       Mg     2.4         TPro  4.8<L>  /  Alb  1.1<L>  /  TBili  0.2  /  DBili  .07  /  AST  14<L>  /  ALT  10<L>  /  AlkPhos  152<H>      PT/INR - ( 16 May 2019 18:33 )   PT: 15.5 sec;   INR: 1.37 ratio         PTT - ( 16 May 2019 18:33 )  PTT:32.9 sec  Urinalysis Basic - ( 16 May 2019 11:01 )    Color: Yellow / Appearance: Clear / S.005 / pH: x  Gluc: x / Ketone: Negative  / Bili: Negative / Urobili: Negative mg/dL   Blood: x / Protein: 30 mg/dL / Nitrite: Negative   Leuk Esterase: Trace / RBC: 6-10 /HPF / WBC 3-5   Sq Epi: x / Non Sq Epi: Few / Bacteria: x        Impression: 58y Male admitted with PERFORATED VISCUS, purulent peritonitis  POD#1 s/p ex lap, yusef patch repair of gastric perforation, with peritoneal and stomach biopsies  PMH Gout, Hepatitis C, Pancreatitis, ETOH abuse  3uPRBC and 1uFFP . Currently intubated on levophed    Plan:  -continue critical care and vent/pressor support. extubated per CCU  -continue diflucan, zosyn, protonix BID.   -continue I/O monitoring and hydration, NGT decompression  -wound care per surgical team  -monitor CINDY output  -will discuss pt with surgical attending

## 2019-05-17 NOTE — OCCUPATIONAL THERAPY INITIAL EVALUATION ADULT - TRANSFER SAFETY CONCERNS NOTED: SIT/STAND, REHAB EVAL
decreased balance during turns/decreased step length/decreased safety awareness/losing balance/decreased weight-shifting ability

## 2019-05-17 NOTE — DIETITIAN INITIAL EVALUATION ADULT. - OTHER INFO
Pt seen for CCU admission & RN consult 5/16 BMI=18.2. Pt with s/p +abdominal pain x 3 days PTA & Vomiting s/p perforated viscous & Exp lap 5/16 with NGT LWS remains NPO.  Pt extubated 5/17.

## 2019-05-17 NOTE — PROGRESS NOTE ADULT - ASSESSMENT
Pt is a 57 yo BM active smoker with h/o ETOH abuse, chronic pancreatitis, Hep C and gout. Pt was recently hospitalized in March 2 to abdominal pain and back pain and Rx'd for acute on chronic pancreatitis, YUNIEL  (pre renal azotemia, NSAID AIN). Hospital course was complicated by R knee gouty flare. Pt now presents with abdominal pain and N/V. Pt was admitted to surgical service for sepsis, YUNIEL,  peritonitis due to perforated viscous. Pt taken to OR s/p ex lap, abdominal washout for purulent peritonitis, David patch for gastric perforation. Admitting dx: 1) Septic shock 2 to perforated viscus 2) Acute blood loss anemia 3) ? acute on CKD    Resp: Weaning well on CPAP 5 + PS 5 with good MS; extubate  ID: F/u Cx/ Cont Zosyn + Fluconazole   CVS: Levophed to maintain MAP >65  Heme: s/p PRBC  FEN: NPO/ IVF/ Daily Thiamine, MVI and Folate/ Replace K and phos; pt hypokalemic and hypophosphatemic   Endo: Follow Glu  Renal: Cont hydration and follow BUN/Cr and UO  GI: F/u as per Surg  Neuro/Psych: Start low dose Ativan; pt with h/o ETOH abuse    CCT: 40 min

## 2019-05-17 NOTE — OCCUPATIONAL THERAPY INITIAL EVALUATION ADULT - PERTINENT HX OF CURRENT PROBLEM, REHAB EVAL
Pt was recently hospitalized in March 2 to abdominal pain and back pain and Rx'd for acute on chronic pancreatitis, YUNIEL, Sepsis.  Hospital course was complicated by R knee gouty flare. Pt now presents with abdominal pain and N/V. Pt was admitted to surgical service for sepsis, YUNIEL,  peritonitis due to perforated viscous. Pt taken to OR s/p ex lap, abdominal washout for purulent peritonitis, David patch for gastric perforation

## 2019-05-17 NOTE — PHYSICAL THERAPY INITIAL EVALUATION ADULT - PERTINENT HX OF CURRENT PROBLEM, REHAB EVAL
Pt admitted secondary to perforated viscus bandemia, and abdominal pain. s/p ex-lap. Pt fell 1 week ago and hit head

## 2019-05-17 NOTE — OCCUPATIONAL THERAPY INITIAL EVALUATION ADULT - TRANSFER TRAINING, PT EVAL
Pt will perform functional transfers with rolling walker independently to increase performance with ADLs.

## 2019-05-17 NOTE — DIETITIAN INITIAL EVALUATION ADULT. - PERTINENT LABORATORY DATA
05-17 Na 138 mmol/L Glu 79 mg/dL K+ 3.2 mmol/L<L> Cr 3.98 mg/dL<H> BUN 62 mg/dL<H> Phos 1.5 mg/dL<L> Alb 1.1 g/dL<L> PAB n/a   Hgb 10.7 g/dL<L> Hct 31.7 %<L> HgA1C n/a    Glucose, Serum: 79 mg/dL

## 2019-05-17 NOTE — OCCUPATIONAL THERAPY INITIAL EVALUATION ADULT - NS ASR FOLLOW COMMAND OT EVAL
able to follow single-step instructions able to follow single-step instructions/unable to follow multi-step instructions

## 2019-05-18 LAB
ALBUMIN SERPL ELPH-MCNC: 1 G/DL — LOW (ref 3.3–5)
ALP SERPL-CCNC: 152 U/L — HIGH (ref 40–120)
ALT FLD-CCNC: 8 U/L — LOW (ref 12–78)
ANION GAP SERPL CALC-SCNC: 12 MMOL/L — SIGNIFICANT CHANGE UP (ref 5–17)
AST SERPL-CCNC: 11 U/L — LOW (ref 15–37)
BILIRUB DIRECT SERPL-MCNC: 0.09 MG/DL — SIGNIFICANT CHANGE UP (ref 0.05–0.2)
BILIRUB INDIRECT FLD-MCNC: 0.1 MG/DL — LOW (ref 0.2–1)
BILIRUB SERPL-MCNC: 0.2 MG/DL — SIGNIFICANT CHANGE UP (ref 0.2–1.2)
BUN SERPL-MCNC: 64 MG/DL — HIGH (ref 7–23)
CALCIUM SERPL-MCNC: 7.3 MG/DL — LOW (ref 8.5–10.1)
CHLORIDE SERPL-SCNC: 108 MMOL/L — SIGNIFICANT CHANGE UP (ref 96–108)
CO2 SERPL-SCNC: 19 MMOL/L — LOW (ref 22–31)
CREAT SERPL-MCNC: 3.86 MG/DL — HIGH (ref 0.5–1.3)
CULTURE RESULTS: SIGNIFICANT CHANGE UP
GLUCOSE BLDC GLUCOMTR-MCNC: 69 MG/DL — LOW (ref 70–99)
GLUCOSE BLDC GLUCOMTR-MCNC: 79 MG/DL — SIGNIFICANT CHANGE UP (ref 70–99)
GLUCOSE BLDC GLUCOMTR-MCNC: 82 MG/DL — SIGNIFICANT CHANGE UP (ref 70–99)
GLUCOSE BLDC GLUCOMTR-MCNC: 90 MG/DL — SIGNIFICANT CHANGE UP (ref 70–99)
GLUCOSE SERPL-MCNC: 86 MG/DL — SIGNIFICANT CHANGE UP (ref 70–99)
GRAM STN FLD: SIGNIFICANT CHANGE UP
HCT VFR BLD CALC: 30.5 % — LOW (ref 39–50)
HGB BLD-MCNC: 10.2 G/DL — LOW (ref 13–17)
MAGNESIUM SERPL-MCNC: 2 MG/DL — SIGNIFICANT CHANGE UP (ref 1.6–2.6)
MCHC RBC-ENTMCNC: 29.2 PG — SIGNIFICANT CHANGE UP (ref 27–34)
MCHC RBC-ENTMCNC: 33.4 GM/DL — SIGNIFICANT CHANGE UP (ref 32–36)
MCV RBC AUTO: 87.4 FL — SIGNIFICANT CHANGE UP (ref 80–100)
NRBC # BLD: 0 /100 WBCS — SIGNIFICANT CHANGE UP (ref 0–0)
PHOSPHATE SERPL-MCNC: 1.5 MG/DL — LOW (ref 2.5–4.5)
PLATELET # BLD AUTO: 289 K/UL — SIGNIFICANT CHANGE UP (ref 150–400)
POTASSIUM SERPL-MCNC: 3 MMOL/L — LOW (ref 3.5–5.3)
POTASSIUM SERPL-SCNC: 3 MMOL/L — LOW (ref 3.5–5.3)
PROT SERPL-MCNC: 4.5 GM/DL — LOW (ref 6–8.3)
RBC # BLD: 3.49 M/UL — LOW (ref 4.2–5.8)
RBC # FLD: 19.7 % — HIGH (ref 10.3–14.5)
SODIUM SERPL-SCNC: 139 MMOL/L — SIGNIFICANT CHANGE UP (ref 135–145)
SPECIMEN SOURCE: SIGNIFICANT CHANGE UP
VANCOMYCIN TROUGH SERPL-MCNC: 8 UG/ML — LOW (ref 10–20)
WBC # BLD: 17.32 K/UL — HIGH (ref 3.8–10.5)
WBC # FLD AUTO: 17.32 K/UL — HIGH (ref 3.8–10.5)

## 2019-05-18 PROCEDURE — 99233 SBSQ HOSP IP/OBS HIGH 50: CPT

## 2019-05-18 RX ORDER — POTASSIUM CHLORIDE 20 MEQ
10 PACKET (EA) ORAL
Refills: 0 | Status: COMPLETED | OUTPATIENT
Start: 2019-05-18 | End: 2019-05-18

## 2019-05-18 RX ORDER — SODIUM CHLORIDE 9 MG/ML
1000 INJECTION, SOLUTION INTRAVENOUS
Refills: 0 | Status: DISCONTINUED | OUTPATIENT
Start: 2019-05-18 | End: 2019-05-18

## 2019-05-18 RX ORDER — SODIUM CHLORIDE 9 MG/ML
1000 INJECTION, SOLUTION INTRAVENOUS
Refills: 0 | Status: DISCONTINUED | OUTPATIENT
Start: 2019-05-18 | End: 2019-05-21

## 2019-05-18 RX ORDER — POTASSIUM PHOSPHATE, MONOBASIC POTASSIUM PHOSPHATE, DIBASIC 236; 224 MG/ML; MG/ML
15 INJECTION, SOLUTION INTRAVENOUS ONCE
Refills: 0 | Status: COMPLETED | OUTPATIENT
Start: 2019-05-18 | End: 2019-05-18

## 2019-05-18 RX ADMIN — FLUCONAZOLE 50 MILLIGRAM(S): 150 TABLET ORAL at 05:20

## 2019-05-18 RX ADMIN — POTASSIUM PHOSPHATE, MONOBASIC POTASSIUM PHOSPHATE, DIBASIC 62.5 MILLIMOLE(S): 236; 224 INJECTION, SOLUTION INTRAVENOUS at 06:45

## 2019-05-18 RX ADMIN — SODIUM CHLORIDE 75 MILLILITER(S): 9 INJECTION, SOLUTION INTRAVENOUS at 11:58

## 2019-05-18 RX ADMIN — FENTANYL CITRATE 50 MICROGRAM(S): 50 INJECTION INTRAVENOUS at 10:38

## 2019-05-18 RX ADMIN — PIPERACILLIN AND TAZOBACTAM 25 GRAM(S): 4; .5 INJECTION, POWDER, LYOPHILIZED, FOR SOLUTION INTRAVENOUS at 05:14

## 2019-05-18 RX ADMIN — FENTANYL CITRATE 50 MICROGRAM(S): 50 INJECTION INTRAVENOUS at 10:22

## 2019-05-18 RX ADMIN — SODIUM CHLORIDE 42 MILLILITER(S): 9 INJECTION, SOLUTION INTRAVENOUS at 10:16

## 2019-05-18 RX ADMIN — FENTANYL CITRATE 50 MICROGRAM(S): 50 INJECTION INTRAVENOUS at 21:40

## 2019-05-18 RX ADMIN — FENTANYL CITRATE 50 MICROGRAM(S): 50 INJECTION INTRAVENOUS at 21:41

## 2019-05-18 RX ADMIN — FENTANYL CITRATE 50 MICROGRAM(S): 50 INJECTION INTRAVENOUS at 00:49

## 2019-05-18 RX ADMIN — Medication 1 MILLIGRAM(S): at 05:13

## 2019-05-18 RX ADMIN — Medication 100 MILLIEQUIVALENT(S): at 08:17

## 2019-05-18 RX ADMIN — FENTANYL CITRATE 50 MICROGRAM(S): 50 INJECTION INTRAVENOUS at 04:27

## 2019-05-18 RX ADMIN — PANTOPRAZOLE SODIUM 40 MILLIGRAM(S): 20 TABLET, DELAYED RELEASE ORAL at 18:01

## 2019-05-18 RX ADMIN — Medication 1 MILLIGRAM(S): at 18:00

## 2019-05-18 RX ADMIN — Medication 1 MILLIGRAM(S): at 11:57

## 2019-05-18 RX ADMIN — Medication 1 MILLIGRAM(S): at 23:36

## 2019-05-18 RX ADMIN — Medication 100 MILLIEQUIVALENT(S): at 06:50

## 2019-05-18 RX ADMIN — PIPERACILLIN AND TAZOBACTAM 25 GRAM(S): 4; .5 INJECTION, POWDER, LYOPHILIZED, FOR SOLUTION INTRAVENOUS at 18:01

## 2019-05-18 RX ADMIN — FENTANYL CITRATE 50 MICROGRAM(S): 50 INJECTION INTRAVENOUS at 04:26

## 2019-05-18 RX ADMIN — CHLORHEXIDINE GLUCONATE 1 APPLICATION(S): 213 SOLUTION TOPICAL at 08:18

## 2019-05-18 RX ADMIN — PANTOPRAZOLE SODIUM 40 MILLIGRAM(S): 20 TABLET, DELAYED RELEASE ORAL at 05:13

## 2019-05-18 RX ADMIN — FENTANYL CITRATE 50 MICROGRAM(S): 50 INJECTION INTRAVENOUS at 00:53

## 2019-05-18 NOTE — PROGRESS NOTE ADULT - SUBJECTIVE AND OBJECTIVE BOX
INTERVAL HPI/OVERNIGHT EVENTS: No acute events overnight. Continues to decrease levophed requirements.    Vital Signs Last 24 Hrs  T(C): 36.3 (18 May 2019 03:45), Max: 37.3 (17 May 2019 07:30)  T(F): 97.4 (18 May 2019 03:45), Max: 99.1 (17 May 2019 07:30)  HR: 77 (18 May 2019 06:00) (62 - 91)  BP: 104/58 (18 May 2019 06:00) (96/54 - 147/67)  BP(mean): 67 (18 May 2019 06:00) (62 - 88)  RR: 16 (18 May 2019 06:00) (12 - 21)  SpO2: 100% (18 May 2019 06:00) (94% - 100%)    MEDICATIONS  (STANDING):  chlorhexidine 4% Liquid 1 Application(s) Topical <User Schedule>  dextrose 5% + lactated ringers. 1000 milliLiter(s) (75 mL/Hr) IV Continuous <Continuous>  fluconAZOLE IVPB 100 milliGRAM(s) IV Intermittent every 24 hours  LORazepam   Injectable 1 milliGRAM(s) IV Push every 6 hours  norepinephrine Infusion 0.05 MICROgram(s)/kG/Min (3.403 mL/Hr) IV Continuous <Continuous>  pantoprazole  Injectable 40 milliGRAM(s) IV Push every 12 hours  piperacillin/tazobactam IVPB. 3.375 Gram(s) IV Intermittent every 12 hours  potassium chloride  10 mEq/100 mL IVPB 10 milliEquivalent(s) IV Intermittent every 1 hour  potassium phosphate IVPB 15 milliMole(s) IV Intermittent once    MEDICATIONS  (PRN):  fentaNYL    Injectable 50 MICROGram(s) IV Push every 4 hours PRN Moderate Pain (4 - 6)      PHYSICAL EXAM    GENERAL: Arousable but lethargic. Does not follow commands. In bilateral wrist restraints.  CHEST/LUNG: CTAB. NGT in place.  HEART: RRR  ABDOMEN: Soft, minimally TTP. 3 Jps in place. (50cc recorded from perisplenic drain). All serosanguinous. Midline dressing changed. Packed with iodoform packing and covered with dry gauze and tape. No pus, No foul odor.    I&O:  I&O's Detail    16 May 2019 07:01  -  17 May 2019 07:00  --------------------------------------------------------  IN:    dexmedetomidine Infusion: 43.9 mL    dextrose 5% + lactated ringers.: 375 mL    Enteral Tube Flush: 120 mL    IV PiggyBack: 300 mL    Lactated Ringers IV Bolus: 1000 mL    norepinephrine Infusion: 229.7 mL    Packed Red Blood Cells: 627 mL    Solution: 150 mL  Total IN: 2845.6 mL    OUT:    Drain: 80 mL    Drain: 150 mL    Drain: 60 mL    Indwelling Catheter - Urethral: 320 mL  Total OUT: 610 mL    Total NET: 2235.6 mL      17 May 2019 07:01  -  18 May 2019 06:12  --------------------------------------------------------  IN:    dexmedetomidine Infusion: 12.5 mL    dextrose 5% + lactated ringers.: 1309 mL    IV PiggyBack: 500 mL    norepinephrine Infusion: 115.5 mL    Solution: 100 mL  Total IN: 2037 mL    OUT:    Drain: 10 mL    Drain: 10 mL    Drain: 130 mL    Indwelling Catheter - Urethral: 490 mL    Nasoenteral Tube: 100 mL  Total OUT: 740 mL    Total NET: 1297 mL          LABS:                        10.2   17.32 )-----------( 289      ( 18 May 2019 03:23 )             30.5     -18    139  |  108  |  64<H>  ----------------------------<  86  3.0<L>   |  19<L>  |  3.86<H>    Ca    7.3<L>      18 May 2019 03:23  Phos  1.5       Mg     2.0         TPro  4.5<L>  /  Alb  1.0<L>  /  TBili  0.2  /  DBili  .09  /  AST  11<L>  /  ALT  8<L>  /  AlkPhos  152<H>      PT/INR - ( 16 May 2019 18:33 )   PT: 15.5 sec;   INR: 1.37 ratio         PTT - ( 16 May 2019 18:33 )  PTT:32.9 sec  Urinalysis Basic - ( 16 May 2019 11:01 )    Color: Yellow / Appearance: Clear / S.005 / pH: x  Gluc: x / Ketone: Negative  / Bili: Negative / Urobili: Negative mg/dL   Blood: x / Protein: 30 mg/dL / Nitrite: Negative   Leuk Esterase: Trace / RBC: 6-10 /HPF / WBC 3-5   Sq Epi: x / Non Sq Epi: Few / Bacteria: x      Culture Results:   Normal Respiratory Mariah present ( @ 00:44)  Culture Results:   Growth in anaerobic bottle: Gram Positive Cocci in Clusters  Growth in aerobic bottle: Gram Positive Cocci in Clusters  "Due to technical problems, Proteus sp. will Not be reported as part of  the BCID panel until further notice"  ***Blood Panel PCR results on this specimen are available  approximately 3 hours after the Gram stain result.***  Gram stain, PCR, and/or culture results may not always  correspond due to difference in methodologies.  ************************************************************  This PCR assay was performed using [employer].  The following targets are tested for: Enterococcus,  vancomycin resistant enterococci, Listeria monocytogenes,  coagulase negative staphylococci, S. aureus,  methicillin resistant S. aureus, Streptococcus agalactiae  (Group B), S. pneumoniae, S. pyogenes (Group A),  Acinetobacter baumannii, Enterobacter cloacae, E. coli,  Klebsiella oxytoca, K. pneumoniae, Proteus sp.,  Serratia marcescens, Haemophilus influenzae,  Neisseria meningitidis, Pseudomonas aeruginosa, Candida  albicans, C. glabrata, C krusei, C parapsilosis,  C. tropicalis and the KPC resistance gene. ( @ 18:36)  Culture Results:   Growth in anaerobic bottle:  Gram Positive Cocci in Clusters  Growth in aerobic bottle:  Gram Positive Cocci in Clusters ( @ 18:36)  Culture Results:   No growth ( @ 17:20)  Culture Results:   Rare Staphylococcus aureus Susceptibility to follow. ( @ 09:25)        Impression: 58M POD 2 s/p David patch repair of perforated viscus.    Plan:  Electrolye repletion  Pain control  Continue to wean levo as tolerated  ABX  Strict I/O  Appreciate CCU care.

## 2019-05-18 NOTE — PROGRESS NOTE ADULT - SUBJECTIVE AND OBJECTIVE BOX
HPI:  Pt is a 59 yo BM active smoker with h/o ETOH abuse, chronic pancreatitis, Hep C and gout. Pt was recently hospitalized in  to abdominal pain and back pain and Rx'd for acute on chronic pancreatitis, YUNIEL  (pre renal azotemia, NSAID AIN). Hospital course was complicated by R knee gouty flare. Pt now presents with abdominal pain and N/V. Pt was admitted to surgical service for sepsis, YUNIEL,  peritonitis due to perforated viscous. Pt taken to OR s/p ex lap, abdominal washout for purulent peritonitis, David patch for gastric perforation. Admitting dx: 1) Septic shock 2 to perforated viscus 2) Acute blood loss anemia 3) ? acute on CKD. s/p extubation       ## Labs:  CBC:                        10.2   17.32 )-----------( 289      ( 18 May 2019 03:23 )             30.5     Chem:      139  |  108  |  64<H>  ----------------------------<  86  3.0<L>   |  19<L>  |  3.86<H>    Ca    7.3<L>      18 May 2019 03:23  Phos  1.5       Mg     2.0         TPro  4.5<L>  /  Alb  1.0<L>  /  TBili  0.2  /  DBili  .09  /  AST  11<L>  /  ALT  8<L>  /  AlkPhos  152<H>      Coags:  PT/INR - ( 16 May 2019 18:33 )   PT: 15.5 sec;   INR: 1.37 ratio         PTT - ( 16 May 2019 18:33 )  PTT:32.9 sec  culture blood:  --   @ 00:44            culture sputum:     Normal Respiratory Mariah present           culture urine:  --  @ 00:44  culture blood:  --   @ 18:36            culture sputum:     Growth in aerobic and anaerobic bottles: Staphylococcus aureus  "Due to technical problems, Proteus sp. will Not be reported as part of  the BCID panel until further notice"  ***Blood Panel PCR results on this specimen are available  approximately 3 hours after the Gram stain result.***  Gram stain, PCR, and/or culture results may not always  correspond due to difference in methodologies.  ************************************************************  This PCR assay was performed using [facility].  The following targets are tested for: Enterococcus,  vancomycin resistant enterococci, Listeria monocytogenes,  coagulase negative staphylococci, S. aureus,  methicillin resistant S. aureus, Streptococcus agalactiae  (Group B), S. pneumoniae, S. pyogenes (Group A),  Acinetobacter baumannii, Enterobacter cloacae, E. coli,  Klebsiella oxytoca, K. pneumoniae, Proteus sp.,  Serratia marcescens, Haemophilus influenzae,  Neisseria meningitidis, Pseudomonas aeruginosa, Candida  albicans, C. glabrata, C krusei, C parapsilosis,  C. tropicalis and the KPC resistance gene.           culture urine:  --  18:36  culture blood:  --   17:20            culture sputum:     No growth           culture urine:  --  17:20        ## Imaging:    ## Medications:  fluconAZOLE IVPB 100 milliGRAM(s) IV Intermittent every 24 hours  piperacillin/tazobactam IVPB. 3.375 Gram(s) IV Intermittent every 12 hours            pantoprazole  Injectable 40 milliGRAM(s) IV Push every 12 hours    fentaNYL    Injectable 50 MICROGram(s) IV Push every 4 hours PRN  LORazepam   Injectable 1 milliGRAM(s) IV Push every 6 hours      ## Vitals:  T(C): 36.9 (19 @ 16:35), Max: 37 (19 @ 07:57)  HR: 90 (19 @ 16:30) (66 - 102)  BP: 134/72 (19 @ 16:30) (90/53 - 147/67)  BP(mean): 88 (19 @ 16:30) (61 - 93)  RR: 18 (19 @ 16:30) (12 - 21)  SpO2: 100% (19 @ 16:30) (90% - 100%)  Wt(kg): --  Vent:   AB-17 @ 07:01  -   @ 07:00  --------------------------------------------------------  IN: 2214.3 mL / OUT: 1740 mL / NET: 474.3 mL     @ 07:01   @ 16:54  --------------------------------------------------------  IN: 1282.6 mL / OUT: 535 mL / NET: 747.6 mL          ## P/E:  Gen: lying comfortably in bed in no apparent distress  Lungs: CTA  Heart: RRR  Abd: Soft/+BS/ R CINDY x1 L CINDY x2  Ext: No edema  Neuro: agitated    CENTRAL LINE: [ ] YES [ ] NO  LOCATION:   DATE INSERTED:  REMOVE: [ ] YES [ ] NO      GARCIA: [ x] YES [ ] NO    DATE INSERTED:  REMOVE:  [x ] YES [ ] NO      A-LINE:  [ ] YES [ ] NO  LOCATION:   DATE INSERTED:  REMOVE:  [ ] YES [ ] NO  EXPLAIN:    CODE STATUS: [x ] full code  [ ] DNR  [ ] DNI  [ ] NICK  Goals of care discussion: [ ] yes

## 2019-05-18 NOTE — PROGRESS NOTE ADULT - ASSESSMENT
Pt is a 59 yo BM active smoker with h/o ETOH abuse, chronic pancreatitis, Hep C and gout. Pt was recently hospitalized in March 2 to abdominal pain and back pain and Rx'd for acute on chronic pancreatitis, YUNIEL  (pre renal azotemia, NSAID AIN). Hospital course was complicated by R knee gouty flare. Pt now presents with abdominal pain and N/V. Pt was admitted to surgical service for sepsis, YUNIEL,  peritonitis due to perforated viscous. Pt taken to OR s/p ex lap, abdominal washout for purulent peritonitis, David patch for gastric perforation. Admitting dx: 1) Septic shock 2 to perforated viscus 2) Acute blood loss anemia 3) ? acute on CKD. s/p extubation 5/17    Resp: Supplemental O2 prn  ID: Bld Cx and peritoneal fluid with sensitive Staph aureus/ Can probably cont Zosyn as the Staph is sensitive to Unasyn. Dc Vanco and cont Fluconazole   CVS: Off Levophed   Heme: s/p PRBC  FEN: NPO/ IVF/ Daily Thiamine, MVI and Folate/ Replace K and phos; pt hypokalemic and hypophosphatemic   Endo: Follow Glu  Renal: Cont hydration and follow BUN/Cr (Cr is decreasing) and UO  GI: F/u as per Surg  Neuro/Psych: Cont low dose Ativan; pt with h/o ETOH abuse

## 2019-05-19 LAB
-  AMPICILLIN/SULBACTAM: SIGNIFICANT CHANGE UP
-  CEFAZOLIN: SIGNIFICANT CHANGE UP
-  CLINDAMYCIN: SIGNIFICANT CHANGE UP
-  ERYTHROMYCIN: SIGNIFICANT CHANGE UP
-  GENTAMICIN: SIGNIFICANT CHANGE UP
-  OXACILLIN: SIGNIFICANT CHANGE UP
-  PENICILLIN: SIGNIFICANT CHANGE UP
-  RIFAMPIN: SIGNIFICANT CHANGE UP
-  TETRACYCLINE: SIGNIFICANT CHANGE UP
-  TRIMETHOPRIM/SULFAMETHOXAZOLE: SIGNIFICANT CHANGE UP
-  VANCOMYCIN: SIGNIFICANT CHANGE UP
ALBUMIN SERPL ELPH-MCNC: 1 G/DL — LOW (ref 3.3–5)
ALP SERPL-CCNC: 211 U/L — HIGH (ref 40–120)
ALT FLD-CCNC: 8 U/L — LOW (ref 12–78)
ANION GAP SERPL CALC-SCNC: 11 MMOL/L — SIGNIFICANT CHANGE UP (ref 5–17)
AST SERPL-CCNC: 11 U/L — LOW (ref 15–37)
BILIRUB DIRECT SERPL-MCNC: 0.08 MG/DL — SIGNIFICANT CHANGE UP (ref 0.05–0.2)
BILIRUB INDIRECT FLD-MCNC: 0.1 MG/DL — LOW (ref 0.2–1)
BILIRUB SERPL-MCNC: 0.2 MG/DL — SIGNIFICANT CHANGE UP (ref 0.2–1.2)
BUN SERPL-MCNC: 60 MG/DL — HIGH (ref 7–23)
CALCIUM SERPL-MCNC: 7.2 MG/DL — LOW (ref 8.5–10.1)
CHLORIDE SERPL-SCNC: 109 MMOL/L — HIGH (ref 96–108)
CO2 SERPL-SCNC: 20 MMOL/L — LOW (ref 22–31)
CREAT SERPL-MCNC: 3.33 MG/DL — HIGH (ref 0.5–1.3)
CULTURE RESULTS: SIGNIFICANT CHANGE UP
CULTURE RESULTS: SIGNIFICANT CHANGE UP
GLUCOSE BLDC GLUCOMTR-MCNC: 113 MG/DL — HIGH (ref 70–99)
GLUCOSE BLDC GLUCOMTR-MCNC: 451 MG/DL — CRITICAL HIGH (ref 70–99)
GLUCOSE BLDC GLUCOMTR-MCNC: 84 MG/DL — SIGNIFICANT CHANGE UP (ref 70–99)
GLUCOSE BLDC GLUCOMTR-MCNC: 91 MG/DL — SIGNIFICANT CHANGE UP (ref 70–99)
GLUCOSE BLDC GLUCOMTR-MCNC: 93 MG/DL — SIGNIFICANT CHANGE UP (ref 70–99)
GLUCOSE BLDC GLUCOMTR-MCNC: 99 MG/DL — SIGNIFICANT CHANGE UP (ref 70–99)
GLUCOSE SERPL-MCNC: 92 MG/DL — SIGNIFICANT CHANGE UP (ref 70–99)
GRAM STN FLD: SIGNIFICANT CHANGE UP
GRAM STN FLD: SIGNIFICANT CHANGE UP
HCT VFR BLD CALC: 31.5 % — LOW (ref 39–50)
HGB BLD-MCNC: 10.6 G/DL — LOW (ref 13–17)
MAGNESIUM SERPL-MCNC: 2.2 MG/DL — SIGNIFICANT CHANGE UP (ref 1.6–2.6)
MCHC RBC-ENTMCNC: 29.2 PG — SIGNIFICANT CHANGE UP (ref 27–34)
MCHC RBC-ENTMCNC: 33.7 GM/DL — SIGNIFICANT CHANGE UP (ref 32–36)
MCV RBC AUTO: 86.8 FL — SIGNIFICANT CHANGE UP (ref 80–100)
METHOD TYPE: SIGNIFICANT CHANGE UP
NRBC # BLD: 0 /100 WBCS — SIGNIFICANT CHANGE UP (ref 0–0)
ORGANISM # SPEC MICROSCOPIC CNT: SIGNIFICANT CHANGE UP
PHOSPHATE SERPL-MCNC: 1.7 MG/DL — LOW (ref 2.5–4.5)
PLATELET # BLD AUTO: 306 K/UL — SIGNIFICANT CHANGE UP (ref 150–400)
POTASSIUM SERPL-MCNC: 2.9 MMOL/L — CRITICAL LOW (ref 3.5–5.3)
POTASSIUM SERPL-SCNC: 2.9 MMOL/L — CRITICAL LOW (ref 3.5–5.3)
PROT SERPL-MCNC: 4.8 GM/DL — LOW (ref 6–8.3)
RBC # BLD: 3.63 M/UL — LOW (ref 4.2–5.8)
RBC # FLD: 19.8 % — HIGH (ref 10.3–14.5)
SODIUM SERPL-SCNC: 140 MMOL/L — SIGNIFICANT CHANGE UP (ref 135–145)
SPECIMEN SOURCE: SIGNIFICANT CHANGE UP
SPECIMEN SOURCE: SIGNIFICANT CHANGE UP
WBC # BLD: 29.07 K/UL — HIGH (ref 3.8–10.5)
WBC # FLD AUTO: 29.07 K/UL — HIGH (ref 3.8–10.5)

## 2019-05-19 PROCEDURE — 99233 SBSQ HOSP IP/OBS HIGH 50: CPT

## 2019-05-19 PROCEDURE — 71045 X-RAY EXAM CHEST 1 VIEW: CPT | Mod: 26

## 2019-05-19 RX ORDER — POTASSIUM PHOSPHATE, MONOBASIC POTASSIUM PHOSPHATE, DIBASIC 236; 224 MG/ML; MG/ML
30 INJECTION, SOLUTION INTRAVENOUS ONCE
Refills: 0 | Status: COMPLETED | OUTPATIENT
Start: 2019-05-19 | End: 2019-05-19

## 2019-05-19 RX ORDER — POTASSIUM CHLORIDE 20 MEQ
20 PACKET (EA) ORAL
Refills: 0 | Status: COMPLETED | OUTPATIENT
Start: 2019-05-19 | End: 2019-05-19

## 2019-05-19 RX ADMIN — POTASSIUM PHOSPHATE, MONOBASIC POTASSIUM PHOSPHATE, DIBASIC 83.33 MILLIMOLE(S): 236; 224 INJECTION, SOLUTION INTRAVENOUS at 05:48

## 2019-05-19 RX ADMIN — FENTANYL CITRATE 50 MICROGRAM(S): 50 INJECTION INTRAVENOUS at 12:08

## 2019-05-19 RX ADMIN — FENTANYL CITRATE 50 MICROGRAM(S): 50 INJECTION INTRAVENOUS at 17:30

## 2019-05-19 RX ADMIN — FENTANYL CITRATE 50 MICROGRAM(S): 50 INJECTION INTRAVENOUS at 11:51

## 2019-05-19 RX ADMIN — SODIUM CHLORIDE 75 MILLILITER(S): 9 INJECTION, SOLUTION INTRAVENOUS at 01:04

## 2019-05-19 RX ADMIN — FLUCONAZOLE 50 MILLIGRAM(S): 150 TABLET ORAL at 08:33

## 2019-05-19 RX ADMIN — Medication 100 MILLIEQUIVALENT(S): at 11:37

## 2019-05-19 RX ADMIN — Medication 1 MILLIGRAM(S): at 05:32

## 2019-05-19 RX ADMIN — PANTOPRAZOLE SODIUM 40 MILLIGRAM(S): 20 TABLET, DELAYED RELEASE ORAL at 05:33

## 2019-05-19 RX ADMIN — Medication 100 MILLIEQUIVALENT(S): at 08:34

## 2019-05-19 RX ADMIN — FENTANYL CITRATE 50 MICROGRAM(S): 50 INJECTION INTRAVENOUS at 17:12

## 2019-05-19 RX ADMIN — PIPERACILLIN AND TAZOBACTAM 25 GRAM(S): 4; .5 INJECTION, POWDER, LYOPHILIZED, FOR SOLUTION INTRAVENOUS at 05:32

## 2019-05-19 RX ADMIN — FENTANYL CITRATE 50 MICROGRAM(S): 50 INJECTION INTRAVENOUS at 23:59

## 2019-05-19 RX ADMIN — SODIUM CHLORIDE 75 MILLILITER(S): 9 INJECTION, SOLUTION INTRAVENOUS at 17:17

## 2019-05-19 RX ADMIN — SODIUM CHLORIDE 42 MILLILITER(S): 9 INJECTION, SOLUTION INTRAVENOUS at 11:37

## 2019-05-19 RX ADMIN — CHLORHEXIDINE GLUCONATE 1 APPLICATION(S): 213 SOLUTION TOPICAL at 08:34

## 2019-05-19 RX ADMIN — FENTANYL CITRATE 50 MICROGRAM(S): 50 INJECTION INTRAVENOUS at 23:29

## 2019-05-19 RX ADMIN — PANTOPRAZOLE SODIUM 40 MILLIGRAM(S): 20 TABLET, DELAYED RELEASE ORAL at 17:17

## 2019-05-19 RX ADMIN — PIPERACILLIN AND TAZOBACTAM 25 GRAM(S): 4; .5 INJECTION, POWDER, LYOPHILIZED, FOR SOLUTION INTRAVENOUS at 17:18

## 2019-05-19 NOTE — PROGRESS NOTE ADULT - SUBJECTIVE AND OBJECTIVE BOX
INTERVAL HPI/OVERNIGHT EVENTS: No acute events overnight. Patient denies having pain. Keep trying to remove NGT from nose.    Vital Signs Last 24 Hrs  T(C): 37.7 (19 May 2019 04:26), Max: 37.7 (19 May 2019 04:26)  T(F): 99.9 (19 May 2019 04:26), Max: 99.9 (19 May 2019 04:26)  HR: 104 (19 May 2019 06:00) (80 - 104)  BP: 179/88 (19 May 2019 06:00) (90/53 - 179/88)  BP(mean): 110 (19 May 2019 06:00) (61 - 110)  RR: 30 (19 May 2019 06:00) (14 - 30)  SpO2: 94% (19 May 2019 06:00) (90% - 100%)    MEDICATIONS  (STANDING):  chlorhexidine 4% Liquid 1 Application(s) Topical <User Schedule>  dextrose 5% + lactated ringers. 1000 milliLiter(s) (75 mL/Hr) IV Continuous <Continuous>  fluconAZOLE IVPB 100 milliGRAM(s) IV Intermittent every 24 hours  LORazepam   Injectable 1 milliGRAM(s) IV Push every 6 hours  pantoprazole  Injectable 40 milliGRAM(s) IV Push every 12 hours  piperacillin/tazobactam IVPB. 3.375 Gram(s) IV Intermittent every 12 hours  potassium chloride  20 mEq/100 mL IVPB 20 milliEquivalent(s) IV Intermittent every 2 hours  sodium chloride 0.9% 1000 milliLiter(s) (42 mL/Hr) IV Continuous <Continuous>    MEDICATIONS  (PRN):  fentaNYL    Injectable 50 MICROGram(s) IV Push every 4 hours PRN Moderate Pain (4 - 6)      PHYSICAL EXAM    GENERAL: Awake and alert. Mumbling incoherent phrases. Talking about "[place]". Wrists restrained with mittens.  CHEST/LUNG: CTAB. NGT in place.  HEART: Tachycardic. No murmurs.  ABDOMEN: Soft, appropriately TTP during dressing change. Packing replaced with clean gauze and tape placed over midline incision. 3 JPs serosanguinous.  : Kenney in place.    I&O:  I&O's Detail    17 May 2019 07:01  -  18 May 2019 07:00  --------------------------------------------------------  IN:    dexmedetomidine Infusion: 12.5 mL    dextrose 5% + lactated ringers.: 1384 mL    norepinephrine Infusion: 117.8 mL    Solution: 200 mL    Solution: 500 mL  Total IN: 2214.3 mL    OUT:    Drain: 130 mL    Drain: 10 mL    Drain: 10 mL    Indwelling Catheter - Urethral: 1290 mL    Nasoenteral Tube: 300 mL  Total OUT: 1740 mL    Total NET: 474.3 mL      18 May 2019 07:01  -  19 May 2019 06:58  --------------------------------------------------------  IN:    dextrose 5% + lactated ringers.: 1650 mL    norepinephrine Infusion: 5.6 mL    sodium chloride 0.9%: 798 mL    Solution: 250 mL    Solution: 100 mL    Solution: 100 mL  Total IN: 2903.6 mL    OUT:    Drain: 65 mL    Drain: 25 mL    Drain: 25 mL    Indwelling Catheter - Urethral: 485 mL    Nasoenteral Tube: 100 mL    Voided: 50 mL  Total OUT: 750 mL    Total NET: 2153.6 mL          LABS:                        10.6   29.07 )-----------( 306      ( 19 May 2019 03:47 )             31.5     05-19    140  |  109<H>  |  60<H>  ----------------------------<  92  2.9<LL>   |  20<L>  |  3.33<H>    Ca    7.2<L>      19 May 2019 03:47  Phos  1.7     05-19  Mg     2.2     05-19    TPro  4.8<L>  /  Alb  1.0<L>  /  TBili  0.2  /  DBili  .08  /  AST  11<L>  /  ALT  8<L>  /  AlkPhos  211<H>  05-19        Culture Results:   Normal Respiratory Mariah present (05-17 @ 00:44)  Culture Results:   Growth in aerobic and anaerobic bottles: Staphylococcus aureus  "Due to technical problems, Proteus sp. will Not be reported as part of  the BCID panel until further notice"  ***Blood Panel PCR results on this specimen are available  approximately 3 hours after the Gram stain result.***  Gram stain, PCR, and/or culture results may not always  correspond due to difference in methodologies.  ************************************************************  This PCR assay was performed using [employer].  The following targets are tested for: Enterococcus,  vancomycin resistant enterococci, Listeria monocytogenes,  coagulase negative staphylococci, S. aureus,  methicillin resistant S. aureus, Streptococcus agalactiae  (Group B), S. pneumoniae, S. pyogenes (Group A),  Acinetobacter baumannii, Enterobacter cloacae, E. coli,  Klebsiella oxytoca, K. pneumoniae, Proteus sp.,  Serratia marcescens, Haemophilus influenzae,  Neisseria meningitidis, Pseudomonas aeruginosa, Candida  albicans, C. glabrata, C krusei, C parapsilosis,  C. tropicalis and the KPC resistance gene. (05-16 @ 18:36)  Culture Results:   Growth in aerobic and anaerobic bottles: Staphylococcus aureus  See previous culture  [id number] (05-16 @ 18:36)  Culture Results:   No growth (05-16 @ 17:20)  Culture Results:   Rare Staphylococcus aureus (05-16 @ 09:25)        Impression: 58M POD 3 s/p David patch repair. Hemodynamically stable.    Plan:  Continue ABX  Daily dressing changes  UGI Tuesday to evaluate for leak  Continue NGT  ?start removing drains  Pain control

## 2019-05-19 NOTE — PROGRESS NOTE ADULT - ASSESSMENT
Pt is a 57 yo BM active smoker with h/o ETOH abuse, chronic pancreatitis, Hep C and gout. Pt was recently hospitalized in March 2 to abdominal pain and back pain and Rx'd for acute on chronic pancreatitis, YUNIEL  (pre renal azotemia, NSAID AIN). Hospital course was complicated by R knee gouty flare. Pt now presents with abdominal pain and N/V. Pt was admitted to surgical service for sepsis, YUNIEL,  peritonitis due to perforated viscous. Pt taken to OR s/p ex lap, abdominal washout for purulent peritonitis, David patch for gastric perforation. Admitting dx: 1) Septic shock 2 to perforated viscus 2) Acute blood loss anemia 3) ? acute on CKD. s/p extubation 5/17    Resp: Supplemental O2 prn  ID: Bld Cx and peritoneal fluid with sensitive Staph aureus/ Cont Zosyn as the Staph is sensitive to Unasyn and cont Fluconazole   CVS: Off Levophed   Heme: s/p PRBC  FEN: NPO/ IVF/ Daily Thiamine, MVI and Folate/ Replace K and phos; pt hypokalemic and hypophosphatemic   Endo: Follow Glu  Renal: Cont hydration and follow BUN/Cr (Cr is decreasing) and UO  GI: F/u as per Surg  Neuro/Psych: Cont low dose Ativan; pt with h/o ETOH abuse  OOB->chair

## 2019-05-19 NOTE — PROGRESS NOTE ADULT - SUBJECTIVE AND OBJECTIVE BOX
HPI:  Pt is a 59 yo BM active smoker with h/o ETOH abuse, chronic pancreatitis, Hep C and gout. Pt was recently hospitalized in  to abdominal pain and back pain and Rx'd for acute on chronic pancreatitis, YUNIEL  (pre renal azotemia, NSAID AIN). Hospital course was complicated by R knee gouty flare. Pt now presents with abdominal pain and N/V. Pt was admitted to surgical service for sepsis, YUNIEL,  peritonitis due to perforated viscous. Pt taken to OR s/p ex lap, abdominal washout for purulent peritonitis, David patch for gastric perforation. Admitting dx: 1) Septic shock 2 to perforated viscus 2) Acute blood loss anemia 3) ? acute on CKD. s/p extubation       ## Labs:  CBC:                        10.6   29.07 )-----------( 306      ( 19 May 2019 03:47 )             31.5     Chem:      140  |  109<H>  |  60<H>  ----------------------------<  92  2.9<LL>   |  20<L>  |  3.33<H>    Ca    7.2<L>      19 May 2019 03:47  Phos  1.7       Mg     2.2         TPro  4.8<L>  /  Alb  1.0<L>  /  TBili  0.2  /  DBili  .08  /  AST  11<L>  /  ALT  8<L>  /  AlkPhos  211<H>      Coags:          ## Imaging:    ## Medications:  fluconAZOLE IVPB 100 milliGRAM(s) IV Intermittent every 24 hours  piperacillin/tazobactam IVPB. 3.375 Gram(s) IV Intermittent every 12 hours            pantoprazole  Injectable 40 milliGRAM(s) IV Push every 12 hours    fentaNYL    Injectable 50 MICROGram(s) IV Push every 4 hours PRN  LORazepam   Injectable 1 milliGRAM(s) IV Push every 6 hours      ## Vitals:  T(C): 37 (19 @ 12:00), Max: 37.7 (19 @ 04:26)  HR: 94 (19 @ 15:00) (80 - 109)  BP: 140/69 (19 @ 15:00) (107/60 - 179/88)  BP(mean): 86 (19 @ 15:00) (72 - 110)  RR: 17 (19 @ 15:00) (13 - 30)  SpO2: 100% (19 @ 15:00) (94% - 100%)  Wt(kg): --  Vent:   AB-18 @ 07:01  -   @ 07:00  --------------------------------------------------------  IN: 3020.6 mL / OUT: 950 mL / NET: 2070.6 mL     @ 07:01  -   @ 16:02  --------------------------------------------------------  IN: 1186 mL / OUT: 75 mL / NET: 1111 mL          ## P/E:  Gen: lying comfortably in bed in no apparent distress  Lungs: CTA  Heart: Tachy  Abd: Soft/+BS/ R CINDY x1 L CINDY x2  Ext: No edema  Neuro: Confused    CENTRAL LINE: [ ] YES [ ] NO  LOCATION:   DATE INSERTED:  REMOVE: [ ] YES [ ] NO      GARCIA: [ x] YES [ ] NO    DATE INSERTED:  REMOVE:  [x ] YES [ ] NO      A-LINE:  [ ] YES [ ] NO  LOCATION:   DATE INSERTED:  REMOVE:  [ ] YES [ ] NO  EXPLAIN:    CODE STATUS: [x ] full code  [ ] DNR  [ ] DNI  [ ] MOLST  Goals of care discussion: [ ] yes

## 2019-05-20 LAB
ANION GAP SERPL CALC-SCNC: 12 MMOL/L — SIGNIFICANT CHANGE UP (ref 5–17)
BUN SERPL-MCNC: 51 MG/DL — HIGH (ref 7–23)
CALCIUM SERPL-MCNC: 6.9 MG/DL — LOW (ref 8.5–10.1)
CHLORIDE SERPL-SCNC: 115 MMOL/L — HIGH (ref 96–108)
CO2 SERPL-SCNC: 17 MMOL/L — LOW (ref 22–31)
CREAT SERPL-MCNC: 2.5 MG/DL — HIGH (ref 0.5–1.3)
GLUCOSE BLDC GLUCOMTR-MCNC: 74 MG/DL — SIGNIFICANT CHANGE UP (ref 70–99)
GLUCOSE BLDC GLUCOMTR-MCNC: 85 MG/DL — SIGNIFICANT CHANGE UP (ref 70–99)
GLUCOSE BLDC GLUCOMTR-MCNC: 86 MG/DL — SIGNIFICANT CHANGE UP (ref 70–99)
GLUCOSE BLDC GLUCOMTR-MCNC: 95 MG/DL — SIGNIFICANT CHANGE UP (ref 70–99)
GLUCOSE BLDC GLUCOMTR-MCNC: 99 MG/DL — SIGNIFICANT CHANGE UP (ref 70–99)
GLUCOSE SERPL-MCNC: 80 MG/DL — SIGNIFICANT CHANGE UP (ref 70–99)
HCT VFR BLD CALC: 29.6 % — LOW (ref 39–50)
HGB BLD-MCNC: 10 G/DL — LOW (ref 13–17)
MAGNESIUM SERPL-MCNC: 2 MG/DL — SIGNIFICANT CHANGE UP (ref 1.6–2.6)
MCHC RBC-ENTMCNC: 29.3 PG — SIGNIFICANT CHANGE UP (ref 27–34)
MCHC RBC-ENTMCNC: 33.8 GM/DL — SIGNIFICANT CHANGE UP (ref 32–36)
MCV RBC AUTO: 86.8 FL — SIGNIFICANT CHANGE UP (ref 80–100)
NRBC # BLD: 0 /100 WBCS — SIGNIFICANT CHANGE UP (ref 0–0)
PHOSPHATE SERPL-MCNC: 2.4 MG/DL — LOW (ref 2.5–4.5)
PLATELET # BLD AUTO: 305 K/UL — SIGNIFICANT CHANGE UP (ref 150–400)
POTASSIUM SERPL-MCNC: 3.2 MMOL/L — LOW (ref 3.5–5.3)
POTASSIUM SERPL-SCNC: 3.2 MMOL/L — LOW (ref 3.5–5.3)
RBC # BLD: 3.41 M/UL — LOW (ref 4.2–5.8)
RBC # FLD: 20 % — HIGH (ref 10.3–14.5)
SODIUM SERPL-SCNC: 144 MMOL/L — SIGNIFICANT CHANGE UP (ref 135–145)
WBC # BLD: 41.36 K/UL — CRITICAL HIGH (ref 3.8–10.5)
WBC # FLD AUTO: 41.36 K/UL — CRITICAL HIGH (ref 3.8–10.5)

## 2019-05-20 PROCEDURE — 99223 1ST HOSP IP/OBS HIGH 75: CPT

## 2019-05-20 PROCEDURE — 99291 CRITICAL CARE FIRST HOUR: CPT

## 2019-05-20 PROCEDURE — 74176 CT ABD & PELVIS W/O CONTRAST: CPT | Mod: 26

## 2019-05-20 RX ORDER — MEROPENEM 1 G/30ML
1000 INJECTION INTRAVENOUS ONCE
Refills: 0 | Status: DISCONTINUED | OUTPATIENT
Start: 2019-05-20 | End: 2019-05-20

## 2019-05-20 RX ORDER — MEROPENEM 1 G/30ML
INJECTION INTRAVENOUS
Refills: 0 | Status: DISCONTINUED | OUTPATIENT
Start: 2019-05-20 | End: 2019-05-20

## 2019-05-20 RX ORDER — POTASSIUM CHLORIDE 20 MEQ
10 PACKET (EA) ORAL ONCE
Refills: 0 | Status: DISCONTINUED | OUTPATIENT
Start: 2019-05-20 | End: 2019-05-20

## 2019-05-20 RX ORDER — VANCOMYCIN HCL 1 G
1000 VIAL (EA) INTRAVENOUS ONCE
Refills: 0 | Status: COMPLETED | OUTPATIENT
Start: 2019-05-20 | End: 2019-05-20

## 2019-05-20 RX ORDER — MORPHINE SULFATE 50 MG/1
2 CAPSULE, EXTENDED RELEASE ORAL ONCE
Refills: 0 | Status: DISCONTINUED | OUTPATIENT
Start: 2019-05-20 | End: 2019-05-20

## 2019-05-20 RX ORDER — FENTANYL CITRATE 50 UG/ML
50 INJECTION INTRAVENOUS ONCE
Refills: 0 | Status: DISCONTINUED | OUTPATIENT
Start: 2019-05-20 | End: 2019-05-20

## 2019-05-20 RX ORDER — NAFCILLIN 10 G/100ML
2 INJECTION, POWDER, FOR SOLUTION INTRAVENOUS ONCE
Refills: 0 | Status: COMPLETED | OUTPATIENT
Start: 2019-05-20 | End: 2019-05-20

## 2019-05-20 RX ORDER — MEROPENEM 1 G/30ML
1000 INJECTION INTRAVENOUS ONCE
Refills: 0 | Status: COMPLETED | OUTPATIENT
Start: 2019-05-20 | End: 2019-05-20

## 2019-05-20 RX ORDER — IOHEXOL 300 MG/ML
30 INJECTION, SOLUTION INTRAVENOUS ONCE
Refills: 0 | Status: COMPLETED | OUTPATIENT
Start: 2019-05-20 | End: 2019-05-20

## 2019-05-20 RX ORDER — NAFCILLIN 10 G/100ML
INJECTION, POWDER, FOR SOLUTION INTRAVENOUS
Refills: 0 | Status: DISCONTINUED | OUTPATIENT
Start: 2019-05-20 | End: 2019-06-04

## 2019-05-20 RX ORDER — GENTAMICIN SULFATE 40 MG/ML
180 VIAL (ML) INJECTION ONCE
Refills: 0 | Status: COMPLETED | OUTPATIENT
Start: 2019-05-20 | End: 2019-05-20

## 2019-05-20 RX ORDER — MEROPENEM 1 G/30ML
1000 INJECTION INTRAVENOUS EVERY 12 HOURS
Refills: 0 | Status: DISCONTINUED | OUTPATIENT
Start: 2019-05-20 | End: 2019-05-20

## 2019-05-20 RX ORDER — POTASSIUM CHLORIDE 20 MEQ
10 PACKET (EA) ORAL ONCE
Refills: 0 | Status: COMPLETED | OUTPATIENT
Start: 2019-05-20 | End: 2019-05-20

## 2019-05-20 RX ORDER — NAFCILLIN 10 G/100ML
2 INJECTION, POWDER, FOR SOLUTION INTRAVENOUS EVERY 4 HOURS
Refills: 0 | Status: DISCONTINUED | OUTPATIENT
Start: 2019-05-20 | End: 2019-06-04

## 2019-05-20 RX ORDER — POTASSIUM PHOSPHATE, MONOBASIC POTASSIUM PHOSPHATE, DIBASIC 236; 224 MG/ML; MG/ML
15 INJECTION, SOLUTION INTRAVENOUS ONCE
Refills: 0 | Status: COMPLETED | OUTPATIENT
Start: 2019-05-20 | End: 2019-05-20

## 2019-05-20 RX ORDER — METRONIDAZOLE 500 MG
500 TABLET ORAL EVERY 8 HOURS
Refills: 0 | Status: DISCONTINUED | OUTPATIENT
Start: 2019-05-20 | End: 2019-06-03

## 2019-05-20 RX ADMIN — Medication 1 MILLIGRAM(S): at 18:06

## 2019-05-20 RX ADMIN — NAFCILLIN 200 GRAM(S): 10 INJECTION, POWDER, FOR SOLUTION INTRAVENOUS at 18:07

## 2019-05-20 RX ADMIN — FENTANYL CITRATE 50 MICROGRAM(S): 50 INJECTION INTRAVENOUS at 11:05

## 2019-05-20 RX ADMIN — FENTANYL CITRATE 50 MICROGRAM(S): 50 INJECTION INTRAVENOUS at 18:06

## 2019-05-20 RX ADMIN — Medication 1 MILLIGRAM(S): at 13:02

## 2019-05-20 RX ADMIN — FLUCONAZOLE 50 MILLIGRAM(S): 150 TABLET ORAL at 06:16

## 2019-05-20 RX ADMIN — FENTANYL CITRATE 50 MICROGRAM(S): 50 INJECTION INTRAVENOUS at 21:28

## 2019-05-20 RX ADMIN — MORPHINE SULFATE 2 MILLIGRAM(S): 50 CAPSULE, EXTENDED RELEASE ORAL at 01:57

## 2019-05-20 RX ADMIN — FENTANYL CITRATE 50 MICROGRAM(S): 50 INJECTION INTRAVENOUS at 21:43

## 2019-05-20 RX ADMIN — NAFCILLIN 200 GRAM(S): 10 INJECTION, POWDER, FOR SOLUTION INTRAVENOUS at 12:48

## 2019-05-20 RX ADMIN — IOHEXOL 30 MILLILITER(S): 300 INJECTION, SOLUTION INTRAVENOUS at 14:18

## 2019-05-20 RX ADMIN — Medication 1 MILLIGRAM(S): at 05:42

## 2019-05-20 RX ADMIN — Medication 1 MILLIGRAM(S): at 23:35

## 2019-05-20 RX ADMIN — Medication 100 MILLIGRAM(S): at 21:17

## 2019-05-20 RX ADMIN — CHLORHEXIDINE GLUCONATE 1 APPLICATION(S): 213 SOLUTION TOPICAL at 05:42

## 2019-05-20 RX ADMIN — Medication 100 MILLIEQUIVALENT(S): at 10:51

## 2019-05-20 RX ADMIN — Medication 200 MILLIGRAM(S): at 12:50

## 2019-05-20 RX ADMIN — PANTOPRAZOLE SODIUM 40 MILLIGRAM(S): 20 TABLET, DELAYED RELEASE ORAL at 05:24

## 2019-05-20 RX ADMIN — MORPHINE SULFATE 2 MILLIGRAM(S): 50 CAPSULE, EXTENDED RELEASE ORAL at 01:27

## 2019-05-20 RX ADMIN — Medication 100 MILLIGRAM(S): at 14:18

## 2019-05-20 RX ADMIN — FENTANYL CITRATE 50 MICROGRAM(S): 50 INJECTION INTRAVENOUS at 04:47

## 2019-05-20 RX ADMIN — NAFCILLIN 200 GRAM(S): 10 INJECTION, POWDER, FOR SOLUTION INTRAVENOUS at 21:17

## 2019-05-20 RX ADMIN — FENTANYL CITRATE 50 MICROGRAM(S): 50 INJECTION INTRAVENOUS at 18:21

## 2019-05-20 RX ADMIN — Medication 250 MILLIGRAM(S): at 06:16

## 2019-05-20 RX ADMIN — MEROPENEM 100 MILLIGRAM(S): 1 INJECTION INTRAVENOUS at 10:51

## 2019-05-20 RX ADMIN — PANTOPRAZOLE SODIUM 40 MILLIGRAM(S): 20 TABLET, DELAYED RELEASE ORAL at 18:05

## 2019-05-20 RX ADMIN — SODIUM CHLORIDE 75 MILLILITER(S): 9 INJECTION, SOLUTION INTRAVENOUS at 23:36

## 2019-05-20 RX ADMIN — FENTANYL CITRATE 50 MICROGRAM(S): 50 INJECTION INTRAVENOUS at 05:15

## 2019-05-20 RX ADMIN — FENTANYL CITRATE 50 MICROGRAM(S): 50 INJECTION INTRAVENOUS at 10:50

## 2019-05-20 RX ADMIN — POTASSIUM PHOSPHATE, MONOBASIC POTASSIUM PHOSPHATE, DIBASIC 62.5 MILLIMOLE(S): 236; 224 INJECTION, SOLUTION INTRAVENOUS at 08:36

## 2019-05-20 RX ADMIN — PIPERACILLIN AND TAZOBACTAM 25 GRAM(S): 4; .5 INJECTION, POWDER, LYOPHILIZED, FOR SOLUTION INTRAVENOUS at 05:24

## 2019-05-20 NOTE — CONSULT NOTE ADULT - ASSESSMENT
pt seen with Acute bacterial peritonitis sec to perforated stomach wall s/p yusef patch repair   OR c/s MSSA   leukocytosis worsening ,no diarrhea fever or shock   change coverage to Nafcillin while affectively covering MSSA and empiric coverage of gram neg and anaweerobes 58 yr old seen with pt seen with

## 2019-05-20 NOTE — CONSULT NOTE ADULT - SUBJECTIVE AND OBJECTIVE BOX
Infectious Diseases - Attending at Dr. Reagan    HPI:  59 y/o male PMHx ETOH abuse, chronic pancreatitis, treated hepatitis C, gout c/o severe lower abdominal pain x 3 days. Pain worsened today. Pain is associated with nausea and multiple episodes of clear vomiting. Patient took 2 aspirins which did not relieve the pain. Last BM was 3 days ago. Last flatus was today. Patient denies fever, chills, constipation, diarrhea, hematuria, melena, hematochezia, chest pain, shortness of breath, dizziness. Patient denies prior incident. (16 May 2019 02:18)      PAST MEDICAL & SURGICAL HISTORY:  Gout  Hepatitis C: treated  Pancreatitis  ETOH abuse  No significant past surgical history      Allergies    No Known Allergies    Intolerances        FAMILY HISTORY:  No pertinent family history in first degree relatives      SOCIAL HISTORY:    REVIEW OF SYSTEMS:    Constitutional: No fever, weight loss or fatigue  Eyes: No eye pain, visual disturbances, or discharge  ENT:  No difficulty hearing, tinnitus, vertigo; No sinus or throat pain  Neck: No pain or stiffness  Respiratory: No cough, wheezing, chills or hemoptysis  Cardiovascular: No chest pain, palpitations, shortness of breath, dizziness or leg swelling  Gastrointestinal: No abdominal or epigastric pain. No nausea, vomiting or hematemesis; No diarrhea or constipation. No melena or hematochezia.  Genitourinary: No dysuria, frequency, hematuria or incontinence  Neurological: No headaches, memory loss, loss of strength, numbness or tremors  Skin: No itching, burning, rashes or lesions       MEDICATIONS  (STANDING):  chlorhexidine 4% Liquid 1 Application(s) Topical <User Schedule>  dextrose 5% + lactated ringers. 1000 milliLiter(s) (75 mL/Hr) IV Continuous <Continuous>  LORazepam   Injectable 1 milliGRAM(s) IV Push every 6 hours  metroNIDAZOLE  IVPB 500 milliGRAM(s) IV Intermittent every 8 hours  nafcillin  IVPB 2 Gram(s) IV Intermittent every 4 hours  nafcillin  IVPB      pantoprazole  Injectable 40 milliGRAM(s) IV Push every 12 hours  sodium chloride 0.9% 1000 milliLiter(s) (42 mL/Hr) IV Continuous <Continuous>    MEDICATIONS  (PRN):  fentaNYL    Injectable 50 MICROGram(s) IV Push every 4 hours PRN Moderate Pain (4 - 6)      Vital Signs Last 24 Hrs  T(C): 36.3 (20 May 2019 15:19), Max: 37.6 (20 May 2019 06:00)  T(F): 97.4 (20 May 2019 15:19), Max: 99.6 (20 May 2019 06:00)  HR: 90 (20 May 2019 16:08) (82 - 103)  BP: 165/78 (20 May 2019 16:08) (101/55 - 165/78)  BP(mean): 100 (20 May 2019 16:08) (67 - 127)  RR: 22 (20 May 2019 16:08) (0 - 29)  SpO2: 100% (20 May 2019 16:08) (97% - 100%)    PHYSICAL EXAM:    Constitutional: NAD, well-groomed, well-developed  HEENT: PERRLA, EOMI, Normal Hearing, MMM  Neck: No LAD, No JVD  Back: Normal spine flexure, No CVA tenderness  Respiratory: CTAB/L  Cardiovascular: S1 and S2, RRR, no M/G/R  Gastrointestinal: BS+, soft, NT/ND  Extremities: No peripheral edema  Vascular: 2+ peripheral pulses  Neurological: A/O x 3, no focal deficits  Skin: No rashes      LABS:                        10.0   41.36 )-----------( 305      ( 20 May 2019 04:32 )             29.6     05-20    144  |  115<H>  |  51<H>  ----------------------------<  80  3.2<L>   |  17<L>  |  2.50<H>    Ca    6.9<L>      20 May 2019 04:32  Phos  2.4     05-20  Mg     2.0     05-20    TPro  4.8<L>  /  Alb  1.0<L>  /  TBili  0.2  /  DBili  .08  /  AST  11<L>  /  ALT  8<L>  /  AlkPhos  211<H>  05-19          MICROBIOLOGY:  RECENT CULTURES:  05-17 .Sputum XXXX   Moderate polymorphonuclear leukocytes per low power field  Rare Squamous epithelial cells per low power field  Numerous Gram Positive Cocci in Pairs and Chains per oil power field  Moderate Gram Negative Rods per oil power field  Few Gram Positive Cocci in Clusters per oil power field   Normal Respiratory Mariah present    05-16 .Blood Blood Culture PCR  Staphylococcus aureus   Growth in anaerobic bottle: Gram Positive Cocci in Clusters  Growth in aerobic bottle: Gram Positive Cocci in Clusters   Growth in aerobic and anaerobic bottles: Staphylococcus aureus  "Due to technical problems, Proteus sp. will Not be reported as part of  the BCID panel until further notice"  ***Blood Panel PCR results on this specimen are available  approximately 3 hours after the Gram stain result.***  Gram stain, PCR, and/or culture results may not always  correspond due to difference in methodologies.  ************************************************************  This PCR assay was performed using [employer].  The following targets are tested for: Enterococcus,  vancomycin resistant enterococci, Listeria monocytogenes,  coagulase negative staphylococci, S. aureus,  methicillin resistant S. aureus, Streptococcus agalactiae  (Group B), S. pneumoniae, S. pyogenes (Group A),  Acinetobacter baumannii, Enterobacter cloacae, E. coli,  Klebsiella oxytoca, K. pneumoniae, Proteus sp.,  Serratia marcescens, Haemophilus influenzae,  Neisseria meningitidis, Pseudomonas aeruginosa, Candida  albicans, C. glabrata, C krusei, C parapsilosis,  C. tropicalis and the KPC resistance gene.    05-16 .Urine XXXX XXXX   No growth    05-16 Peritoneal peritoneal fluid c/s esw Staphylococcus aureus   polymorphonuclear leukocytes per low power field  No organisms seen per oil power field  by cytocentrifuge   Rare Staphylococcus aureus          RADIOLOGY & ADDITIONAL STUDIES: Infectious Diseases - Attending at Dr. Reagan    HPI:  59 y/o male PMHx ETOH abuse, chronic pancreatitis, treated hepatitis C, gout c/o severe lower abdominal pain x 3 days. Pain worsened today. Pain is associated with nausea and multiple episodes of clear vomiting. Patient took 2 aspirins which did not relieve the pain. Last BM was 3 days ago. Last flatus was today. Patient denies fever, chills, constipation, diarrhea, hematuria, melena, hematochezia, chest pain, shortness of breath, dizziness. Patient denies prior incident. (16 May 2019 02:18)    Pt very lethargic and poor historian , History and hospital course taken from the chart     PAST MEDICAL & SURGICAL HISTORY:  Gout  Hepatitis C: treated  Pancreatitis  ETOH abuse  No significant past surgical history      Allergies    No Known Allergies    Intolerances        FAMILY HISTORY:  No pertinent family history in first degree relatives      SOCIAL HISTORY: non smoker    REVIEW OF SYSTEMS:  as per [facility], pt too lethargic to give ROS    MEDICATIONS  (STANDING):  chlorhexidine 4% Liquid 1 Application(s) Topical <User Schedule>  dextrose 5% + lactated ringers. 1000 milliLiter(s) (75 mL/Hr) IV Continuous <Continuous>  LORazepam   Injectable 1 milliGRAM(s) IV Push every 6 hours  metroNIDAZOLE  IVPB 500 milliGRAM(s) IV Intermittent every 8 hours  nafcillin  IVPB 2 Gram(s) IV Intermittent every 4 hours  nafcillin  IVPB      pantoprazole  Injectable 40 milliGRAM(s) IV Push every 12 hours  sodium chloride 0.9% 1000 milliLiter(s) (42 mL/Hr) IV Continuous <Continuous>    MEDICATIONS  (PRN):  fentaNYL    Injectable 50 MICROGram(s) IV Push every 4 hours PRN Moderate Pain (4 - 6)      Vital Signs Last 24 Hrs  T(C): 36.3 (20 May 2019 15:19), Max: 37.6 (20 May 2019 06:00)  T(F): 97.4 (20 May 2019 15:19), Max: 99.6 (20 May 2019 06:00)  HR: 90 (20 May 2019 16:08) (82 - 103)  BP: 165/78 (20 May 2019 16:08) (101/55 - 165/78)  BP(mean): 100 (20 May 2019 16:08) (67 - 127)  RR: 22 (20 May 2019 16:08) (0 - 29)  SpO2: 100% (20 May 2019 16:08) (97% - 100%)    PHYSICAL EXAM:    Constitutional: NAD, lethargic ,normal built,NGT +  HEENT: PERRL  Neck: No LAD, No JVD  Back: Normal spine flexure, No CVA tenderness  Respiratory: CTAB/L  Cardiovascular: S1 and S2, RRR, no M/G/R  Gastrointestinal: s/p E x lap with drains x 3   Extremities: No peripheral edema  Vascular: 2+ peripheral pulses  Neurological: cant be examined sec to lethargy   Skin: No rashes  Peripheral lines + only ,no central line      LABS:                        10.0   41.36 )-----------( 305      ( 20 May 2019 04:32 )             29.6     05-20    144  |  115<H>  |  51<H>  ----------------------------<  80  3.2<L>   |  17<L>  |  2.50<H>    Ca    6.9<L>      20 May 2019 04:32  Phos  2.4     05-20  Mg     2.0     05-20    TPro  4.8<L>  /  Alb  1.0<L>  /  TBili  0.2  /  DBili  .08  /  AST  11<L>  /  ALT  8<L>  /  AlkPhos  211<H>  05-19          MICROBIOLOGY:  RECENT CULTURES:  05-17 .Sputum XXXX   Moderate polymorphonuclear leukocytes per low power field  Rare Squamous epithelial cells per low power field  Numerous Gram Positive Cocci in Pairs and Chains per oil power field  Moderate Gram Negative Rods per oil power field  Few Gram Positive Cocci in Clusters per oil power field   Normal Respiratory Mariah present    05-16 .Blood Blood Culture PCR  Staphylococcus aureus   Growth in anaerobic bottle: Gram Positive Cocci in Clusters  Growth in aerobic bottle: Gram Positive Cocci in Clusters   Growth in aerobic and anaerobic bottles: Staphylococcus aureus  "Due to technical problems, Proteus sp. will Not be reported as part of  the BCID panel until further notice"  ***Blood Panel PCR results on this specimen are available  approximately 3 hours after the Gram stain result.***  Gram stain, PCR, and/or culture results may not always  correspond due to difference in methodologies.  ************************************************************  This PCR assay was performed using [employer].  The following targets are tested for: Enterococcus,  vancomycin resistant enterococci, Listeria monocytogenes,  coagulase negative staphylococci, S. aureus,  methicillin resistant S. aureus, Streptococcus agalactiae  (Group B), S. pneumoniae, S. pyogenes (Group A),  Acinetobacter baumannii, Enterobacter cloacae, E. coli,  Klebsiella oxytoca, K. pneumoniae, Proteus sp.,  Serratia marcescens, Haemophilus influenzae,  Neisseria meningitidis, Pseudomonas aeruginosa, Candida  albicans, C. glabrata, C krusei, C parapsilosis,  C. tropicalis and the KPC resistance gene.    05-16 .Urine XXXX XXXX   No growth    05-16 Peritoneal peritoneal fluid c/s esw Staphylococcus aureus   polymorphonuclear leukocytes per low power field  No organisms seen per oil power field  by cytocentrifuge   Rare Staphylococcus aureus          RADIOLOGY & ADDITIONAL STUDIES:

## 2019-05-20 NOTE — PROGRESS NOTE ADULT - SUBJECTIVE AND OBJECTIVE BOX
HPI:  58M active smoker PMH ETOH abuse, chronic pancreatitis, Hep C and gout, recently hospitalized in March for abdominal pain and back pain. Treated for acute on chronic pancreatitis, YUNIEL  (pre renal azotemia, NSAID AIN), R knee gouty flare. Pt now presents with abdominal pain, N/V x3 days. Pt was admitted to surgical service for sepsis, YUNIEL,  peritonitis due to perforated viscous. Pt taken to OR s/p ex lap, abdominal washout for purulent peritonitis, David patch for gastric perforation. Post op course with post op anemia Hb 5.5, hypokalemia, hypoglycemia. Extubated 5/17. Worsening leukocytosis    ## 24 hour events  remains off pressors  CINDY drains with serous drainage  leukocytosis worsening  subclavian central line d/paulina yesterday  mildly agitated over night per nursing staff        ## Labs:           10.0   41.36 )-----------( 305      ( 20 May 2019 04:32 )             29.6     05-20    144  |  115<H>  |  51<H>  ----------------------------<  80  3.2<L>   |  17<L>  |  2.50<H>    Ca    6.9<L>      20 May 2019 04:32  Phos  2.4     05-20  Mg     2.0     05-20    TPro  4.8<L>  /  Alb  1.0<L>  /  TBili  0.2  /  DBili  .08  /  AST  11<L>  /  ALT  8<L>  /  AlkPhos  211<H>  05-19        Culture - Urine (05.16.19 @ 17:20)    Specimen Source: .Urine    Culture Results: No growth    Culture - Body Fluid with Gram Stain (05.16.19 @ 09:25)    Specimen Source: Peritoneal peritoneal fluid c/s esw    Culture Results:     Rare Staphylococcus aureus      Culture - Blood (05.16.19 @ 18:36)    Specimen Source: .Blood    Organism: Blood Culture PCR    Organism: Staphylococcus aureus    Culture Results: Growth in aerobic and anaerobic bottles: Staphylococcus aureus    Culture - Blood (05.16.19 @ 18:36)    Specimen Source: .Blood    Culture Results: Growth in aerobic and anaerobic bottles: Staphylococcus aureus          ## Medications:  chlorhexidine 4% Liquid 1 Application(s) Topical <User Schedule>  dextrose 5% + lactated ringers. 1000 milliLiter(s) (75 mL/Hr) IV Continuous <Continuous>  fluconAZOLE IVPB 100 milliGRAM(s) IV Intermittent every 24 hours  iohexol 300 mG (iodine)/mL Oral Solution 30 milliLiter(s) Oral once  LORazepam   Injectable 1 milliGRAM(s) IV Push every 6 hours  meropenem  IVPB 1000 milliGRAM(s) IV Intermittent every 12 hours  pantoprazole  Injectable 40 milliGRAM(s) IV Push every 12 hours  potassium chloride  10 mEq/100 mL IVPB 10 milliEquivalent(s) IV Intermittent once      MEDICATIONS  (PRN):  fentaNYL    Injectable 50 MICROGram(s) IV Push every 4 hours PRN Moderate Pain (4 - 6)        ## Vitals:  T(C): 36.3 (20 May 2019 15:19), Max: 37.6 (20 May 2019 06:00)  T(F): 97.4 (20 May 2019 15:19), Max: 99.6 (20 May 2019 06:00)  HR: 90 (20 May 2019 16:08) (82 - 103)  BP: 165/78 (20 May 2019 16:08) (101/55 - 165/78)  BP(mean): 100 (20 May 2019 16:08) (67 - 127)  RR: 22 (20 May 2019 16:08) (0 - 29)  SpO2: 100% (20 May 2019 16:08) (97% - 100%)      ## P/E:  Gen: awake,  lying in bed, no acute distress  Lungs: CTA b/l  Heart: RRR  Abd: Soft + BS, 2 JPs on left and 1 JPs on right  Ext: No edema  Neuro: awake, alert, but confused/disoriented at times, poor historian (unable to provide much history)    CENTRAL LINE: [ ] YES [x] NO  LOCATION:   DATE INSERTED:  REMOVE: [ ] YES [ ] NO      GARCIA: [ ] YES [ ] NO    DATE INSERTED:  REMOVE:  [ ] YES [ ] NO      A-LINE:  [ ] YES [x] NO  LOCATION:   DATE INSERTED:  REMOVE:  [ ] YES [ ] NO  EXPLAIN:    CODE STATUS: [x ] full code  [ ] DNR  [ ] DNI  [ ] MOLST  Goals of care discussion: [ ] yes

## 2019-05-20 NOTE — PROGRESS NOTE ADULT - ASSESSMENT
58M active smoker PMH ETOH abuse, chronic pancreatitis, Hep C and gout presents with abdominal pain, N/V x3 days. Pt was admitted to surgical service for sepsis, YUNIEL,  peritonitis due to perforated viscous. Taken to OR s/p ex lap, abdominal washout for purulent peritonitis, David patch for gastric perforation. Transferred to ICU post op intubated with sepsis, septic shock on pressors, with post op anemia Hb 5.5, hypokalemia, hypoglycemia. Extubated 5/17. Here with staph bacteremia, staph peritonitis. Post op course with worsening leukocytosis.    1. NEURO  - cont banana bag daily  - on ativan to prevent alcohol withdrawal with hx of alcohol abuse   - monitor CIWA    2. PULM  - extubated and doing well from pulmonary stand point  - no active issues    3. ID  - worsening leukocytosis on labs  - cultures now growing staph in blood and peritoneal swab  - will get CT abdomen also to evaluate for colitis or abscess  - vanco x1 given this AM, and antibiotics broadened from zosyn to meropenem, remains on fluconazole  - repeat blood cx in AM with AM labs  - will get ID consult    4. CV  - out of shock state  - BP stable  - off pressors      5. HEME  - h/h stable  - s/p 3 units pRBC total      6. RENAL  - ARF with ATN resolving  - Cr downtrending  - cont to monitor Cr  - supplement hypokalemia      7. ENDO  - hypoglycemia resolved: likely due to NPO status  - cont with D5 LR IVF while NPO      8. GI  - cont to monitor CINDY drain output  - post operative pain control:  fentanyl prn      9. GEN  - physical therapy  - OOB to chair      Critical Care Time: 35 min

## 2019-05-20 NOTE — PROGRESS NOTE ADULT - SUBJECTIVE AND OBJECTIVE BOX
INTERVAL HPI/OVERNIGHT EVENTS:    Patient lying comfortably.  Pain slow to improve.  NGT intact with no complaint of nausea/vomiting.  +Flatus/No BM.  Wound care done.  Wound irrigated with NS.  Repack with iodoform packing.  Patient tolerated the procedure well.  WBC trending up.        Vital Signs Last 24 Hrs  T(C): 37.2 (20 May 2019 07:31), Max: 37.6 (20 May 2019 06:00)  T(F): 99 (20 May 2019 07:31), Max: 99.6 (20 May 2019 06:00)  HR: 89 (20 May 2019 10:00) (88 - 109)  BP: 122/63 (20 May 2019 10:00) (101/55 - 154/78)  BP(mean): 77 (20 May 2019 10:00) (67 - 96)  RR: 9 (20 May 2019 10:00) (9 - 29)  SpO2: 100% (20 May 2019 10:00) (97% - 100%)    MEDICATIONS  (STANDING):  chlorhexidine 4% Liquid 1 Application(s) Topical <User Schedule>  dextrose 5% + lactated ringers. 1000 milliLiter(s) (75 mL/Hr) IV Continuous <Continuous>  fluconAZOLE IVPB 100 milliGRAM(s) IV Intermittent every 24 hours  iohexol 300 mG (iodine)/mL Oral Solution 30 milliLiter(s) Oral once  LORazepam   Injectable 1 milliGRAM(s) IV Push every 6 hours  meropenem  IVPB 1000 milliGRAM(s) IV Intermittent once  meropenem  IVPB      meropenem  IVPB 1000 milliGRAM(s) IV Intermittent every 12 hours  pantoprazole  Injectable 40 milliGRAM(s) IV Push every 12 hours  potassium chloride  10 mEq/100 mL IVPB 10 milliEquivalent(s) IV Intermittent once  sodium chloride 0.9% 1000 milliLiter(s) (42 mL/Hr) IV Continuous <Continuous>    MEDICATIONS  (PRN):  fentaNYL    Injectable 50 MICROGram(s) IV Push every 4 hours PRN Moderate Pain (4 - 6)      PHYSICAL EXAM:    GENERAL: NAD  HEAD:  Atraumatic, Normocephalic  EYES: EOMI, PERRLA, conjunctiva and sclera clear  CHEST/LUNG: Clear to ausculation, bilaterally   HEART: S1S2  ABDOMEN: wound healing well, min serosanguinous discharge,  CINDY intact with serosanguinous discharge, softly distended, +BS, min tenderness postop area, no guarding.    EXTREMITIES:  calf soft, non tender     I&O's Detail    19 May 2019 07:01  -  20 May 2019 07:00  --------------------------------------------------------  IN:    dextrose 5% + lactated ringers.: 1800 mL    sodium chloride 0.9%: 1008 mL    Solution: 200 mL    Solution: 100 mL    Solution: 200 mL    Solution: 250 mL  Total IN: 3558 mL    OUT:    Drain: 75 mL    Drain: 30 mL    Drain: 20 mL    Voided: 1000 mL  Total OUT: 1125 mL    Total NET: 2433 mL          LABS:                        10.0   41.36 )-----------( 305      ( 20 May 2019 04:32 )             29.6     05-20    144  |  115<H>  |  51<H>  ----------------------------<  80  3.2<L>   |  17<L>  |  2.50<H>    Ca    6.9<L>      20 May 2019 04:32  Phos  2.4     05-20  Mg     2.0     05-20    TPro  4.8<L>  /  Alb  1.0<L>  /  TBili  0.2  /  DBili  .08  /  AST  11<L>  /  ALT  8<L>  /  AlkPhos  211<H>  05-19        Impression:    Pt is a 59 yo BM active smoker with h/o ETOH abuse, chronic pancreatitis, Hep C and gout. Pt was recently hospitalized in March 2 to abdominal pain and back pain and Rx'd for acute on chronic pancreatitis, YUNIEL  (pre renal azotemia, NSAID AIN). Hospital course was complicated by R knee gouty flare. Pt now presents with abdominal pain and N/V. Pt was admitted to surgical service for sepsis, YUNIEL,  peritonitis due to perforated viscous. Pt taken to OR s/p ex lap, abdominal washout for purulent peritonitis, David patch for gastric perforation.    Afebrile, WBC trending up, gm +bacteremia  -- CT abd/pelvis with po contrast, ID consult, -- discuss with CCU team   cont to monitor WBC-- if no leak and WBC trending down -- will start on clear liquid diet as tolerated   PPI   cont medical management/supportive care   prophylactic measure   discuss with Dr. Murray

## 2019-05-21 DIAGNOSIS — D72.829 ELEVATED WHITE BLOOD CELL COUNT, UNSPECIFIED: ICD-10-CM

## 2019-05-21 DIAGNOSIS — R78.81 BACTEREMIA: ICD-10-CM

## 2019-05-21 DIAGNOSIS — K65.9 PERITONITIS, UNSPECIFIED: ICD-10-CM

## 2019-05-21 DIAGNOSIS — Z98.890 OTHER SPECIFIED POSTPROCEDURAL STATES: ICD-10-CM

## 2019-05-21 LAB
ANION GAP SERPL CALC-SCNC: 9 MMOL/L — SIGNIFICANT CHANGE UP (ref 5–17)
ANISOCYTOSIS BLD QL: SLIGHT — SIGNIFICANT CHANGE UP
BASOPHILS # BLD AUTO: 0 K/UL — SIGNIFICANT CHANGE UP (ref 0–0.2)
BASOPHILS NFR BLD AUTO: 0 % — SIGNIFICANT CHANGE UP (ref 0–2)
BLASTS # FLD: 1 % — HIGH (ref 0–0)
BUN SERPL-MCNC: 44 MG/DL — HIGH (ref 7–23)
CALCIUM SERPL-MCNC: 7.7 MG/DL — LOW (ref 8.5–10.1)
CHLORIDE SERPL-SCNC: 114 MMOL/L — HIGH (ref 96–108)
CO2 SERPL-SCNC: 21 MMOL/L — LOW (ref 22–31)
CREAT SERPL-MCNC: 2.21 MG/DL — HIGH (ref 0.5–1.3)
EOSINOPHIL # BLD AUTO: 0 K/UL — SIGNIFICANT CHANGE UP (ref 0–0.5)
EOSINOPHIL NFR BLD AUTO: 0 % — SIGNIFICANT CHANGE UP (ref 0–6)
GLUCOSE BLDC GLUCOMTR-MCNC: 101 MG/DL — HIGH (ref 70–99)
GLUCOSE BLDC GLUCOMTR-MCNC: 87 MG/DL — SIGNIFICANT CHANGE UP (ref 70–99)
GLUCOSE BLDC GLUCOMTR-MCNC: 92 MG/DL — SIGNIFICANT CHANGE UP (ref 70–99)
GLUCOSE BLDC GLUCOMTR-MCNC: 94 MG/DL — SIGNIFICANT CHANGE UP (ref 70–99)
GLUCOSE SERPL-MCNC: 141 MG/DL — HIGH (ref 70–99)
HCT VFR BLD CALC: 32.5 % — LOW (ref 39–50)
HGB BLD-MCNC: 11 G/DL — LOW (ref 13–17)
LYMPHOCYTES # BLD AUTO: 0.9 K/UL — LOW (ref 1–3.3)
LYMPHOCYTES # BLD AUTO: 2 % — LOW (ref 13–44)
MACROCYTES BLD QL: SIGNIFICANT CHANGE UP
MAGNESIUM SERPL-MCNC: 1.9 MG/DL — SIGNIFICANT CHANGE UP (ref 1.6–2.6)
MANUAL SMEAR VERIFICATION: YES — SIGNIFICANT CHANGE UP
MCHC RBC-ENTMCNC: 29.3 PG — SIGNIFICANT CHANGE UP (ref 27–34)
MCHC RBC-ENTMCNC: 33.8 GM/DL — SIGNIFICANT CHANGE UP (ref 32–36)
MCV RBC AUTO: 86.4 FL — SIGNIFICANT CHANGE UP (ref 80–100)
METAMYELOCYTES # FLD: 2 % — HIGH (ref 0–0)
MONOCYTES # BLD AUTO: 0 K/UL — SIGNIFICANT CHANGE UP (ref 0–0.9)
MONOCYTES NFR BLD AUTO: 0 % — LOW (ref 2–14)
NEUTROPHILS # BLD AUTO: 42.96 K/UL — HIGH (ref 1.8–7.4)
NEUTROPHILS NFR BLD AUTO: 95 % — HIGH (ref 43–77)
NRBC # BLD: 0 /100 WBCS — SIGNIFICANT CHANGE UP (ref 0–0)
NRBC # BLD: 0 /100 — SIGNIFICANT CHANGE UP (ref 0–0)
PHOSPHATE SERPL-MCNC: 3.2 MG/DL — SIGNIFICANT CHANGE UP (ref 2.5–4.5)
PLAT MORPH BLD: NORMAL — SIGNIFICANT CHANGE UP
PLATELET # BLD AUTO: 343 K/UL — SIGNIFICANT CHANGE UP (ref 150–400)
POTASSIUM SERPL-MCNC: 3.3 MMOL/L — LOW (ref 3.5–5.3)
POTASSIUM SERPL-SCNC: 3.3 MMOL/L — LOW (ref 3.5–5.3)
RBC # BLD: 3.76 M/UL — LOW (ref 4.2–5.8)
RBC # FLD: 20.1 % — HIGH (ref 10.3–14.5)
RBC BLD AUTO: ABNORMAL
ROULEAUX BLD QL SMEAR: PRESENT
SODIUM SERPL-SCNC: 144 MMOL/L — SIGNIFICANT CHANGE UP (ref 135–145)
TOXIC GRANULES BLD QL SMEAR: PRESENT — SIGNIFICANT CHANGE UP
VANCOMYCIN TROUGH SERPL-MCNC: 9.1 UG/ML — LOW (ref 10–20)
WBC # BLD: 45.22 K/UL — CRITICAL HIGH (ref 3.8–10.5)
WBC # FLD AUTO: 45.22 K/UL — CRITICAL HIGH (ref 3.8–10.5)

## 2019-05-21 PROCEDURE — 93306 TTE W/DOPPLER COMPLETE: CPT | Mod: 26

## 2019-05-21 PROCEDURE — 99221 1ST HOSP IP/OBS SF/LOW 40: CPT

## 2019-05-21 PROCEDURE — 99233 SBSQ HOSP IP/OBS HIGH 50: CPT

## 2019-05-21 RX ORDER — POTASSIUM CHLORIDE 20 MEQ
10 PACKET (EA) ORAL
Refills: 0 | Status: COMPLETED | OUTPATIENT
Start: 2019-05-21 | End: 2019-05-21

## 2019-05-21 RX ORDER — SODIUM CHLORIDE 9 MG/ML
1000 INJECTION, SOLUTION INTRAVENOUS
Refills: 0 | Status: COMPLETED | OUTPATIENT
Start: 2019-05-22 | End: 2019-05-22

## 2019-05-21 RX ADMIN — FENTANYL CITRATE 50 MICROGRAM(S): 50 INJECTION INTRAVENOUS at 14:50

## 2019-05-21 RX ADMIN — FENTANYL CITRATE 50 MICROGRAM(S): 50 INJECTION INTRAVENOUS at 18:18

## 2019-05-21 RX ADMIN — Medication 100 MILLIGRAM(S): at 13:01

## 2019-05-21 RX ADMIN — Medication 100 MILLIEQUIVALENT(S): at 12:59

## 2019-05-21 RX ADMIN — Medication 1 MILLIGRAM(S): at 23:30

## 2019-05-21 RX ADMIN — PANTOPRAZOLE SODIUM 40 MILLIGRAM(S): 20 TABLET, DELAYED RELEASE ORAL at 18:19

## 2019-05-21 RX ADMIN — FENTANYL CITRATE 50 MICROGRAM(S): 50 INJECTION INTRAVENOUS at 22:06

## 2019-05-21 RX ADMIN — FENTANYL CITRATE 50 MICROGRAM(S): 50 INJECTION INTRAVENOUS at 14:22

## 2019-05-21 RX ADMIN — NAFCILLIN 200 GRAM(S): 10 INJECTION, POWDER, FOR SOLUTION INTRAVENOUS at 13:03

## 2019-05-21 RX ADMIN — NAFCILLIN 200 GRAM(S): 10 INJECTION, POWDER, FOR SOLUTION INTRAVENOUS at 21:48

## 2019-05-21 RX ADMIN — FENTANYL CITRATE 50 MICROGRAM(S): 50 INJECTION INTRAVENOUS at 00:22

## 2019-05-21 RX ADMIN — Medication 100 MILLIEQUIVALENT(S): at 10:02

## 2019-05-21 RX ADMIN — FENTANYL CITRATE 50 MICROGRAM(S): 50 INJECTION INTRAVENOUS at 00:09

## 2019-05-21 RX ADMIN — FENTANYL CITRATE 50 MICROGRAM(S): 50 INJECTION INTRAVENOUS at 04:15

## 2019-05-21 RX ADMIN — Medication 100 MILLIEQUIVALENT(S): at 11:17

## 2019-05-21 RX ADMIN — NAFCILLIN 200 GRAM(S): 10 INJECTION, POWDER, FOR SOLUTION INTRAVENOUS at 01:59

## 2019-05-21 RX ADMIN — SODIUM CHLORIDE 42 MILLILITER(S): 9 INJECTION, SOLUTION INTRAVENOUS at 05:48

## 2019-05-21 RX ADMIN — PANTOPRAZOLE SODIUM 40 MILLIGRAM(S): 20 TABLET, DELAYED RELEASE ORAL at 05:37

## 2019-05-21 RX ADMIN — FENTANYL CITRATE 50 MICROGRAM(S): 50 INJECTION INTRAVENOUS at 04:00

## 2019-05-21 RX ADMIN — SODIUM CHLORIDE 75 MILLILITER(S): 9 INJECTION, SOLUTION INTRAVENOUS at 18:20

## 2019-05-21 RX ADMIN — Medication 1 MILLIGRAM(S): at 13:00

## 2019-05-21 RX ADMIN — CHLORHEXIDINE GLUCONATE 1 APPLICATION(S): 213 SOLUTION TOPICAL at 06:09

## 2019-05-21 RX ADMIN — Medication 100 MILLIGRAM(S): at 21:48

## 2019-05-21 RX ADMIN — Medication 1 MILLIGRAM(S): at 05:37

## 2019-05-21 RX ADMIN — FENTANYL CITRATE 50 MICROGRAM(S): 50 INJECTION INTRAVENOUS at 10:30

## 2019-05-21 RX ADMIN — Medication 100 MILLIGRAM(S): at 05:37

## 2019-05-21 RX ADMIN — FENTANYL CITRATE 50 MICROGRAM(S): 50 INJECTION INTRAVENOUS at 21:54

## 2019-05-21 RX ADMIN — NAFCILLIN 200 GRAM(S): 10 INJECTION, POWDER, FOR SOLUTION INTRAVENOUS at 05:37

## 2019-05-21 RX ADMIN — FENTANYL CITRATE 50 MICROGRAM(S): 50 INJECTION INTRAVENOUS at 10:02

## 2019-05-21 RX ADMIN — NAFCILLIN 200 GRAM(S): 10 INJECTION, POWDER, FOR SOLUTION INTRAVENOUS at 18:19

## 2019-05-21 RX ADMIN — Medication 1 MILLIGRAM(S): at 18:19

## 2019-05-21 RX ADMIN — NAFCILLIN 200 GRAM(S): 10 INJECTION, POWDER, FOR SOLUTION INTRAVENOUS at 11:17

## 2019-05-21 NOTE — CONSULT NOTE ADULT - ASSESSMENT
57 yo male s/p ex lap, abdominal washout for purulent peritonitis, David patch for gastric perforation.   Now with markedly elevating leukocytosis.  Pt in CCU  IR consulted for retrogastric collection and/or lateral perihepatic/subcapsular collection 57 yo male s/p ex lap, abdominal washout for purulent peritonitis, David patch for gastric perforation.   Now with markedly elevating leukocytosis.  Pt in CCU  IR consulted for retrogastric collection and/or lateral perihepatic/subcapsular collection drainage

## 2019-05-21 NOTE — PROGRESS NOTE ADULT - SUBJECTIVE AND OBJECTIVE BOX
INTERVAL HPI/OVERNIGHT EVENTS:    Patient lying comfortably.  Abdominal pain slow to improve.  NGT intact with min gastric content.  +Flatus/No BM.  Afebrile.        Vital Signs Last 24 Hrs  T(C): 36 (21 May 2019 07:14), Max: 37.1 (20 May 2019 13:00)  T(F): 96.8 (21 May 2019 07:14), Max: 98.7 (20 May 2019 13:00)  HR: 85 (21 May 2019 11:00) (79 - 108)  BP: 163/104 (21 May 2019 11:00) (133/75 - 171/89)  BP(mean): 119 (21 May 2019 11:00) (87 - 127)  RR: 14 (21 May 2019 11:00) (13 - 25)  SpO2: 100% (21 May 2019 11:00) (93% - 100%)    MEDICATIONS  (STANDING):  chlorhexidine 4% Liquid 1 Application(s) Topical <User Schedule>  dextrose 5% + lactated ringers. 1000 milliLiter(s) (75 mL/Hr) IV Continuous <Continuous>  LORazepam   Injectable 1 milliGRAM(s) IV Push every 6 hours  metroNIDAZOLE  IVPB 500 milliGRAM(s) IV Intermittent every 8 hours  nafcillin  IVPB 2 Gram(s) IV Intermittent every 4 hours  nafcillin  IVPB      pantoprazole  Injectable 40 milliGRAM(s) IV Push every 12 hours  potassium chloride  10 mEq/100 mL IVPB 10 milliEquivalent(s) IV Intermittent every 1 hour  sodium chloride 0.9% 1000 milliLiter(s) (42 mL/Hr) IV Continuous <Continuous>    MEDICATIONS  (PRN):  fentaNYL    Injectable 50 MICROGram(s) IV Push every 4 hours PRN Moderate Pain (4 - 6)      PHYSICAL EXAM:  GENERAL: NAD  HEAD:  Atraumatic, Normocephalic  EYES: EOMI, PERRLA, conjunctiva and sclera clear  CHEST/LUNG: Clear to ausculation, bilaterally   HEART: S1S2  ABDOMEN: wound healing well, min serosanguinous discharge,  CINDY intact with serosanguinous discharge, softly distended, +BS, min tenderness postop area, no guarding.    EXTREMITIES:  calf soft, non tender   I&O's Detail    20 May 2019 07:01  -  21 May 2019 07:00  --------------------------------------------------------  IN:    dextrose 5% + lactated ringers.: 1800 mL    sodium chloride 0.9%: 1008 mL    Solution: 250 mL    Solution: 100 mL    Solution: 400 mL    Solution: 500 mL  Total IN: 4058 mL    OUT:    Drain: 15 mL    Drain: 40 mL    Drain: 120 mL    Voided: 700 mL  Total OUT: 875 mL    Total NET: 3183 mL          LABS:                        11.0   45.22 )-----------( 343      ( 21 May 2019 04:01 )             32.5     05-21    144  |  114<H>  |  44<H>  ----------------------------<  141<H>  3.3<L>   |  21<L>  |  2.21<H>    Ca    7.7<L>      21 May 2019 04:01  Phos  3.2     05-21  Mg     1.9     05-21      Impression:  Pt is a 57 yo BM active smoker with h/o ETOH abuse, chronic pancreatitis, Hep C and gout. Pt was recently hospitalized in March 2 to abdominal pain and back pain and Rx'd for acute on chronic pancreatitis, YUNIEL  (pre renal azotemia, NSAID AIN). Hospital course was complicated by R knee gouty flare. Pt now presents with abdominal pain and N/V. Pt was admitted to surgical service for sepsis, YUNIEL,  peritonitis due to perforated viscous. Pt taken to OR s/p ex lap, abdominal washout for purulent peritonitis, David patch for gastric perforation.      WBC cont to trend up -- Abx change per ID/IR consult for post gastric collection/ cont to monitor WBC poss heme consult   keep npo for now, ngt to lws -- start clear liquid diet tomorrow as tolerated   cont medical management per ccu team   supportive care   prophylactic measure   discuss case with Dr. Murray

## 2019-05-21 NOTE — CHART NOTE - NSCHARTNOTEFT_GEN_A_CORE
Assessment: Pt seen for follow-up & continues with Critical Care & chronic severe malnutrition. Pt with hx ETOHA, Chronic pancreatitis presents with acute bacterial peritonitis due to perforated stomach wall s/p yusef patch repair.    Factors impacting intake: [ ] none [ ] nausea  [ ] vomiting [ ] diarrhea [ ] constipation  [ ]chewing problems [ ] swallowing issues  [x ] other: +abdominal pain    Diet Prescription: Diet, NPO (05-16-19 @ 07:06)    Intake: NPO x 5 days    Current Weight: Weight (kg):Wt=71.7kg(5/21/19), Wt=62.7 (05-16 @ 07:00)  % Weight Change  9kg wt gain(14%) x 5 days due to s/p 4liters IVF bolus & edema    Physical appearance: +1 gen edema +2 gen edema Tray ankle    Pertinent Medications: MEDICATIONS  (STANDING):  chlorhexidine 4% Liquid 1 Application(s) Topical <User Schedule>  dextrose 5% + lactated ringers. 1000 milliLiter(s) (75 mL/Hr) IV Continuous <Continuous>  LORazepam   Injectable 1 milliGRAM(s) IV Push every 6 hours  metroNIDAZOLE  IVPB 500 milliGRAM(s) IV Intermittent every 8 hours  nafcillin  IVPB 2 Gram(s) IV Intermittent every 4 hours  nafcillin  IVPB      pantoprazole  Injectable 40 milliGRAM(s) IV Push every 12 hours  potassium chloride  10 mEq/100 mL IVPB 10 milliEquivalent(s) IV Intermittent every 1 hour  sodium chloride 0.9% 1000 milliLiter(s) (42 mL/Hr) IV Continuous <Continuous>    MEDICATIONS  (PRN):  fentaNYL    Injectable 50 MICROGram(s) IV Push every 4 hours PRN Moderate Pain (4 - 6)    Pertinent Labs: 05-21 Na 144 mmol/L Glu 141 mg/dL<H> K+ 3.3 mmol/L<L> Cr 2.21 mg/dL<H> BUN 44 mg/dL<H> Phos 3.2 mg/dL Alb 1.0(5/19)   PAB n/a   Hgb 11.0 g/dL<L> Hct 32.5 %<L> HgA1C n/a    Glucose, Serum: 141 mg/dL <H>, WBC=45.22(5/21)   24Hr FS:92 mg/dL  86 mg/dL  74 mg/dL  95 mg/dL  85 mg/dL    Skin: intact    Estimated Needs:   [x ] no change since previous assessment (5/17/19)  [ ] recalculated:     Previous Nutrition Diagnosis:   [ ] Inadequate Energy Intake [ ]Inadequate Oral Intake [ ] Excessive Energy Intake   [ ] Underweight [ ] Increased Nutrient Needs [ ] Overweight/Obesity   [ ] Altered GI Function [ ] Unintended Weight Loss [ ] Food & Nutrition Related Knowledge Deficit [x ]Chronic severe Malnutrition [ ] moderate malnutrition  Etiology: Inadequate energy & protein intake related to ETOHA & chronic pancreatitis  Signs/Symptoms: Severe fat loss & muscle wasting    Nutrition Diagnosis is [ ] ongoing  [ ] resolved  [ ] improved  [ ] not applicable   Previous Goal: Pt to tolerate po diet without GI distress when Monitor patients po intake & weights diet adv; not applicable    New Nutrition Diagnosis: [x ] not applicable       Interventions:   Recommend  [x ] Continue: Adv po diet when medically feasible Clear liquids to full liquids to Low fiber with Ensure Enlive 2 x day(700kcal & 40gm protein)  [ ] Change Diet To:  [ ] Nutrition Supplement:  [x ] Nutrition Support: Consider temp nutrition support if NPO/Clear liquids > 7 days PPN/TPN  [ ] Other:     Monitoring and Evaluation:   [ ] PO intake [ x ] Tolerance to diet prescription [ x ] weights [ x ] labs[ x ] follow up per protocol  [ ] other: Assessment: Pt seen for follow-up & continues with Critical Care & chronic severe malnutrition. Pt with hx ETOHA, Chronic pancreatitis presents with acute bacterial peritonitis due to perforated stomach wall s/p yusef patch repair.     Factors impacting intake: [ ] none [ ] nausea  [ ] vomiting [ ] diarrhea [ ] constipation  [ ]chewing problems [ ] swallowing issues  [x ] other: +abdominal pain    Diet Prescription: Diet, NPO (05-16-19 @ 07:06)    Intake: NPO x 5 days    Current Weight: Weight (kg):Wt=71.7kg(5/21/19), Wt=62.7 (05-16 @ 07:00)  % Weight Change  9kg wt gain(14%) x 5 days due to s/p 4liters IVF bolus & edema    Physical appearance: +1 gen edema +2 gen edema Tray ankle    Pertinent Medications: MEDICATIONS  (STANDING):  chlorhexidine 4% Liquid 1 Application(s) Topical <User Schedule>  dextrose 5% + lactated ringers. 1000 milliLiter(s) (75 mL/Hr) IV Continuous <Continuous>  LORazepam   Injectable 1 milliGRAM(s) IV Push every 6 hours  metroNIDAZOLE  IVPB 500 milliGRAM(s) IV Intermittent every 8 hours  nafcillin  IVPB 2 Gram(s) IV Intermittent every 4 hours  nafcillin  IVPB      pantoprazole  Injectable 40 milliGRAM(s) IV Push every 12 hours  potassium chloride  10 mEq/100 mL IVPB 10 milliEquivalent(s) IV Intermittent every 1 hour  sodium chloride 0.9% 1000 milliLiter(s) (42 mL/Hr) IV Continuous <Continuous>    MEDICATIONS  (PRN):  fentaNYL    Injectable 50 MICROGram(s) IV Push every 4 hours PRN Moderate Pain (4 - 6)    Pertinent Labs: 05-21 Na 144 mmol/L Glu 141 mg/dL<H> K+ 3.3 mmol/L<L> Cr 2.21 mg/dL<H> BUN 44 mg/dL<H> Phos 3.2 mg/dL Alb 1.0(5/19)   PAB n/a   Hgb 11.0 g/dL<L> Hct 32.5 %<L> HgA1C n/a    Glucose, Serum: 141 mg/dL <H>, WBC=45.22(5/21)   24Hr FS:92 mg/dL  86 mg/dL  74 mg/dL  95 mg/dL  85 mg/dL    Skin: intact    Estimated Needs:   [x ] no change since previous assessment (5/17/19)  [ ] recalculated:     Previous Nutrition Diagnosis:   [ ] Inadequate Energy Intake [ ]Inadequate Oral Intake [ ] Excessive Energy Intake   [ ] Underweight [ ] Increased Nutrient Needs [ ] Overweight/Obesity   [ ] Altered GI Function [ ] Unintended Weight Loss [ ] Food & Nutrition Related Knowledge Deficit [x ]Chronic severe Malnutrition [ ] moderate malnutrition  Etiology: Inadequate energy & protein intake related to ETOHA & chronic pancreatitis  Signs/Symptoms: Severe fat loss & muscle wasting    Nutrition Diagnosis is [ ] ongoing  [ ] resolved  [ ] improved  [ ] not applicable   Previous Goal: Pt to tolerate po diet without GI distress when Monitor patients po intake & weights diet adv; not applicable    New Nutrition Diagnosis: [x ] not applicable       Interventions:   Recommend  [x ] Continue: Adv po diet when medically feasible Clear liquids to full liquids to Low fiber with Ensure Enlive 2 x day(700kcal & 40gm protein)  [ ] Change Diet To:  [ ] Nutrition Supplement:  [x ] Nutrition Support: Consider temp nutrition support if NPO/Clear liquids > 7 days PPN/TPN  [ ] Other:     Monitoring and Evaluation:   [ ] PO intake [ x ] Tolerance to diet prescription [ x ] weights [ x ] labs[ x ] follow up per protocol  [ ] other:

## 2019-05-21 NOTE — PROGRESS NOTE ADULT - PROBLEM SELECTOR PLAN 1
New CT shows more collection   Surgery spoke with IR if new drainage can be done  cot current antibiotics

## 2019-05-21 NOTE — CONSULT NOTE ADULT - SUBJECTIVE AND OBJECTIVE BOX
IR consulted for possible drainage.  Pt taken to OR s/p ex lap, abdominal washout for purulent peritonitis, David patch for gastric perforation.  White count is trending up to 45.  Afebrile.  Other vitals stable.  Pt states his abdominal pain is improving but he was receiving fentanyl.  Blood cultures pending    Repeat CT shows:  Marked gastric wall thickening. 4.6 x 2.6 cm retrogastric collection,   residual of the collection seen previously. Cannot rule out abscess   versus postoperative seroma.     Loculated right lateral perihepatic versus subcapsular hepatic collection  with mass effect on the hepatic parenchyma.          HPI:  57 y/o male PMHx ETOH abuse, chronic pancreatitis, treated hepatitis C, gout c/o severe lower abdominal pain x 3 days. Pain worsened today. Pain is associated with nausea and multiple episodes of clear vomiting. Patient took 2 aspirins which did not relieve the pain. Last BM was 3 days ago. Last flatus was today. Patient denies fever, chills, constipation, diarrhea, hematuria, melena, hematochezia, chest pain, shortness of breath, dizziness. Patient denies prior incident. (16 May 2019 02:18)        PAST MEDICAL & SURGICAL HISTORY:  Gout  Hepatitis C: treated  Pancreatitis  ETOH abuse  No significant past surgical history      Allergies    No Known Allergies    Intolerances        MEDICATIONS  (STANDING):  chlorhexidine 4% Liquid 1 Application(s) Topical <User Schedule>  dextrose 5% + lactated ringers. 1000 milliLiter(s) (75 mL/Hr) IV Continuous <Continuous>  LORazepam   Injectable 1 milliGRAM(s) IV Push every 6 hours  metroNIDAZOLE  IVPB 500 milliGRAM(s) IV Intermittent every 8 hours  nafcillin  IVPB 2 Gram(s) IV Intermittent every 4 hours  nafcillin  IVPB      pantoprazole  Injectable 40 milliGRAM(s) IV Push every 12 hours  sodium chloride 0.9% 1000 milliLiter(s) (42 mL/Hr) IV Continuous <Continuous>    MEDICATIONS  (PRN):  fentaNYL    Injectable 50 MICROGram(s) IV Push every 4 hours PRN Moderate Pain (4 - 6)        SOCIAL HISTORY:    FAMILY HISTORY:  No pertinent family history in first degree relatives        PHYSICAL EXAM:    Vital Signs Last 24 Hrs  T(C): 36.1 (21 May 2019 15:28), Max: 36.5 (20 May 2019 23:10)  T(F): 97 (21 May 2019 15:28), Max: 97.7 (20 May 2019 23:10)  HR: 82 (21 May 2019 15:00) (78 - 108)  BP: 133/66 (21 May 2019 15:00) (133/66 - 171/89)  BP(mean): 82 (21 May 2019 15:00) (82 - 119)  RR: 10 (21 May 2019 15:00) (10 - 25)  SpO2: 100% (21 May 2019 15:00) (87% - 100%)    General:  Appears stated age, well-groomed, well-nourished, no distress  Lungs:  CTAB  Cardiovascular:  good S1, S2,   Abdomen:  Soft, non-tender, non-distended,   Extremities:  no calf tenderness/swelling b/l  Musculoskeletal:  Full ROM in all joints w/o swelling/tenderness/effusion  Neuro/Psych:  A &O x 3    LABS:                        11.0   45.22 )-----------( 343      ( 21 May 2019 04:01 )             32.5     05-21    144  |  114<H>  |  44<H>  ----------------------------<  141<H>  3.3<L>   |  21<L>  |  2.21<H>    Ca    7.7<L>      21 May 2019 04:01  Phos  3.2     05-21  Mg     1.9     05-21            RADIOLOGY & ADDITIONAL STUDIES:    < from: CT Abdomen and Pelvis w/ Oral Cont (05.20.19 @ 15:53) >  IMPRESSION:     Marked gastric wall thickening. 4.6 x 2.6 cm retrogastric collection,   residual of the collection seen previously. Cannot rule out abscess   versus postoperative seroma.     Loculated right lateral perihepatic versus subcapsular hepatic collection  with mass effect on the hepatic parenchyma.    New moderate bilateral pleural effusions with underlying compressive   atelectasis of the lower lobes.    NG tube tip just past the GE junction.    Postoperative pneumoperitoneum. 3 drainage catheters as above.    Ileus versus early or partial obstruction.    Rectal fecal impaction.    Air in the urinary bladder likely iatrogenic. Correlate clinically.    < end of copied text >

## 2019-05-21 NOTE — PROVIDER CONTACT NOTE (CRITICAL VALUE NOTIFICATION) - BACKGROUND
Pt admitted for sepsis with perforated viscus. Hx of ETOH abuse, Gout, Hepatitis C. Pt already on flagyl, nafcillin, Vancomycin- ID on board + BC.

## 2019-05-21 NOTE — PROGRESS NOTE ADULT - SUBJECTIVE AND OBJECTIVE BOX
HPI:  58M active smoker PMH ETOH abuse, chronic pancreatitis, Hep C and gout, recently hospitalized in March for abdominal pain and back pain. Treated for acute on chronic pancreatitis, YUNIEL  (pre renal azotemia, NSAID AIN), R knee gouty flare. Pt now presents with abdominal pain, N/V x3 days. Pt was admitted to surgical service for sepsis, YUNIEL,  peritonitis due to perforated viscous. Pt taken to OR s/p ex lap, abdominal washout for purulent peritonitis, David patch for gastric perforation. Post op course with post op anemia Hb 5.5, hypokalemia, hypoglycemia. Extubated 5/17. Worsening leukocytosis      ## 24 hour events  CT abdomen yesterday with collection noted behind stomach and lateral aspect of liver  WBC trending up        ## Labs:                       11.0   45.22 )-----------( 343      ( 21 May 2019 04:01 )             32.5       05-21    144  |  114<H>  |  44<H>  ----------------------------<  141<H>  3.3<L>   |  21<L>  |  2.21<H>    Ca    7.7<L>      21 May 2019 04:01  Phos  3.2     05-21  Mg     1.9     05-21          Culture - Sputum . (05.17.19 @ 00:44)    Specimen Source: .Sputum    Culture Results: Normal Respiratory Mariah present      Culture - Urine (05.16.19 @ 17:20)    Specimen Source: .Urine    Culture Results: No growth      Culture - Blood (05.16.19 @ 18:36)    Specimen Source: .Blood    Culture Results: Growth in aerobic and anaerobic bottles: Staphylococcus aureus    Culture - Blood (05.16.19 @ 18:36)    Specimen Source: .Blood    Organism: Blood Culture PCR    Organism: Staphylococcus aureus    Culture Results: Growth in aerobic and anaerobic bottles: Staphylococcus aureus      Culture - Body Fluid with Gram Stain (05.16.19 @ 09:25)    Specimen Source: Peritoneal peritoneal fluid c/s esw    Culture Results: Rare Staphylococcus aureus          ## Imaging:  CT abdomen/pelvis < from: CT Abdomen and Pelvis w/ Oral Cont (05.20.19 @ 15:53) >  Marked gastric wall thickening. 4.6 x 2.6 cm retrogastric collection,   residual of the collection seen previously. Cannot rule out abscess   versus postoperative seroma.     Loculated right lateral perihepatic versus subcapsular hepatic collection  with mass effect on the hepatic parenchyma.    New moderate bilateral pleural effusions with underlying compressive   atelectasis of the lower lobes.    NG tube tip just past the GE junction.    Postoperative pneumoperitoneum. 3 drainage catheters     Ileus versus early or partial obstruction.    Rectal fecal impaction.    Air in the urinary bladder likely iatrogenic. Correlate clinically.            ## Medications:  MEDICATIONS  (STANDING):  chlorhexidine 4% Liquid 1 Application(s) Topical <User Schedule>  dextrose 5% + lactated ringers. 1000 milliLiter(s) (75 mL/Hr) IV Continuous <Continuous>  heparin  Injectable 5000 Unit(s) SubCutaneous every 12 hours  LORazepam   Injectable 1 milliGRAM(s) IV Push every 8 hours  metroNIDAZOLE  IVPB 500 milliGRAM(s) IV Intermittent every 8 hours  multivitamin/thiamine/folic acid in sodium chloride 0.9% 1000 milliLiter(s) (42 mL/Hr) IV Continuous <Continuous>  nafcillin  IVPB 2 Gram(s) IV Intermittent every 4 hours  pantoprazole  Injectable 40 milliGRAM(s) IV Push every 12 hours          ## Vitals:  T(C): 36 (21 May 2019 07:14), Max: 37.1 (20 May 2019 13:00)  T(F): 96.8 (21 May 2019 07:14), Max: 98.7 (20 May 2019 13:00)  HR: 85 (21 May 2019 11:00) (79 - 108)  BP: 163/104 (21 May 2019 11:00) (133/75 - 171/89)  BP(mean): 119 (21 May 2019 11:00) (87 - 127)  RR: 14 (21 May 2019 11:00) (13 - 25)  SpO2: 100% (21 May 2019 11:00) (93% - 100%)        ## P/E:  Gen: awake, alert, lying in bed, no acute distress  Lungs: CTA b/l  Heart: RRR  Abd: Soft + BS, 2 JPs on left and 1 JPs on right  Ext: No edema  Neuro: awake, alert, disoriented at times    CENTRAL LINE: [ ] YES [x] NO  LOCATION:   DATE INSERTED:  REMOVE: [ ] YES [ ] NO      GARCIA: [ ] YES [x] NO    DATE INSERTED:  REMOVE:  [ ] YES [ ] NO      A-LINE:  [ ] YES [x] NO  LOCATION:   DATE INSERTED:  REMOVE:  [ ] YES [ ] NO  EXPLAIN:    CODE STATUS: [x ] full code  [ ] DNR  [ ] DNI  [ ] MOLST  Goals of care discussion: [ ] yes

## 2019-05-21 NOTE — PROGRESS NOTE ADULT - SUBJECTIVE AND OBJECTIVE BOX
Patient is a 58y old  Male who presents with a chief complaint of Abdominal pain, vomiting (21 May 2019 16:01)      INTERVAL HPI / OVERNIGHT EVENTS: more awake today     MEDICATIONS  (STANDING):  chlorhexidine 4% Liquid 1 Application(s) Topical <User Schedule>  dextrose 5% + lactated ringers. 1000 milliLiter(s) (75 mL/Hr) IV Continuous <Continuous>  heparin  Injectable 5000 Unit(s) SubCutaneous every 12 hours  LORazepam   Injectable   IV Push   LORazepam   Injectable 1 milliGRAM(s) IV Push every 8 hours  metroNIDAZOLE  IVPB 500 milliGRAM(s) IV Intermittent every 8 hours  multivitamin/thiamine/folic acid in sodium chloride 0.9% 1000 milliLiter(s) (42 mL/Hr) IV Continuous <Continuous>  nafcillin  IVPB 2 Gram(s) IV Intermittent every 4 hours  nafcillin  IVPB      pantoprazole  Injectable 40 milliGRAM(s) IV Push every 12 hours    MEDICATIONS  (PRN):  fentaNYL    Injectable 50 MICROGram(s) IV Push every 4 hours PRN Moderate Pain (4 - 6)  LORazepam   Injectable 2 milliGRAM(s) IV Push every 2 hours PRN agitation/ restlessness      Vital Signs Last 24 Hrs  T max ; afebrile    Review of systems:  General : no fever /chills,fatigue  CVS : no chest pain, palpitations  Lungs : no shortness of breath, cough  GI : less abdominal pain, No  vomiting,  diarrhea   : no dysuria,hematuria        PHYSICAL EXAM:  General :NAD  Constitutional:  well-groomed, well-developed  Respiratory: CTAB/L  Cardiovascular: S1 and S2, RRR, no M/G/R  Gastrointestinal: s/p Exlap with two drains  Extremities: No peripheral edema  Vascular: 2+ peripheral pulses  Skin: No rashes      LABS:             WBC 4_2> 45          MICROBIOLOGY:  RECENT CULTURES:  05-17 .Sputum XXXX   Moderate polymorphonuclear leukocytes per low power field  Rare Squamous epithelial cells per low power field  Numerous Gram Positive Cocci in Pairs and Chains per oil power field  Moderate Gram Negative Rods per oil power field  Few Gram Positive Cocci in Clusters per oil power field   Normal Respiratory Mariah present    05-16 .Blood Blood Culture PCR  Staphylococcus aureus   Growth in anaerobic bottle: Gram Positive Cocci in Clusters  Growth in aerobic bottle: Gram Positive Cocci in Clusters   Growth in aerobic and anaerobic bottles: Staphylococcus aureus  "Due to technical problems, Proteus sp. will Not be reported as part of  the BCID panel until further notice"  ***Blood Panel PCR results on this specimen are available  approximately 3 hours after the Gram stain result.***  Gram stain, PCR, and/or culture results may not always  correspond due to difference in methodologies.  ************************************************************  This PCR assay was performed using [employer].  The following targets are tested for: Enterococcus,  vancomycin resistant enterococci, Listeria monocytogenes,  coagulase negative staphylococci, S. aureus,  methicillin resistant S. aureus, Streptococcus agalactiae  (Group B), S. pneumoniae, S. pyogenes (Group A),  Acinetobacter baumannii, Enterobacter cloacae, E. coli,  Klebsiella oxytoca, K. pneumoniae, Proteus sp.,  Serratia marcescens, Haemophilus influenzae,  Neisseria meningitidis, Pseudomonas aeruginosa, Candida  albicans, C. glabrata, C krusei, C parapsilosis,  C. tropicalis and the KPC resistance gene.    05-16 .Urine XXXX XXXX   No growth    05-16 Peritoneal peritoneal fluid c/s esw Staphylococcus aureus   polymorphonuclear leukocytes per low power field  No organisms seen per oil power field  by cytocentrifuge   Rare Staphylococcus aureus          RADIOLOGY & ADDITIONAL STUDIES:    CT abdomen   Marked gastric wall thickening. 4.6 x 2.6 cm retrogastric collection,   residual of the collection seen previously. Cannot rule out abscess   versus postoperative seroma.     Loculated right lateral perihepatic versus subcapsular hepatic collection  with mass effect on the hepatic parenchyma.    New moderate bilateral pleural effusions with underlying compressive   atelectasis of the lower lobes.    NG tube tip just past the GE junction.    Postoperative pneumoperitoneum. 3 drainage catheters as above.    Ileus versus early or partial obstruction.    Rectal fecal impaction.    Air in the urinary bladder likely iatrogenic. Correlate clinically.

## 2019-05-21 NOTE — PROGRESS NOTE ADULT - ASSESSMENT
58M active smoker PMH ETOH abuse, chronic pancreatitis, Hep C and gout presents with abdominal pain, N/V x3 days. Pt was admitted to surgical service for sepsis, YUNEIL,  peritonitis due to perforated viscous. Taken to OR s/p ex lap, abdominal washout for purulent peritonitis, David patch for gastric perforation. Transferred to ICU post op intubated with sepsis, septic shock on pressors, with post op anemia Hb 5.5, hypokalemia, hypoglycemia. Extubated 5/17. Here with staph bacteremia, staph peritonitis. Post op course with worsening leukocytosis found to have lateral perihepatic collection and retrogastric collection.       1. NEURO  - cont banana bag daily  - on ativan to prevent alcohol withdrawal  - no reports of agitation   - monitor CIWA    2. PULM  - extubated and doing well from pulmonary stand point  - no active issues    3. ID  - cont nafcillin and flagyl  - check repeat surveillance blood cultures  - leukocytosis worsening, cont to trend WBC  - elevated WBC likely in setting of infection and sepsis, now noted with possible abscess  - if leukocytosis does not improve despite infectious treatment then will need to work up other causes of leukocytosis such as underlying primary hematologic disease  - IR eval for retrogastric and perihepatic/subcapsular collection noted on CT for perc drain  - ID follow up    4. CV  - out of shock state  - BP stable  - off pressors      5. HEME  - h/h stable  - s/p 3 units pRBC total  - leukocytosis likely infectious with + cultures and abdominal collections      6. RENAL  - ARF with ATN resolving  - Cr downtrending  - cont to monitor Cr  - supplement hypokalemia      7. ENDO  - hypoglycemia resolved: likely due to NPO status  - cont with D5 LR IVF while NPO    8. GI  - cont to monitor CINDY drain output  - post operative pain control: fentanyl prn  - + flatus    9. GEN  - physical therapy  - OOB to chair      Critical Care Time: 35 min

## 2019-05-22 LAB
ANION GAP SERPL CALC-SCNC: 9 MMOL/L — SIGNIFICANT CHANGE UP (ref 5–17)
APTT BLD: 33.9 SEC — SIGNIFICANT CHANGE UP (ref 27.5–36.3)
BUN SERPL-MCNC: 38 MG/DL — HIGH (ref 7–23)
CALCIUM SERPL-MCNC: 7.7 MG/DL — LOW (ref 8.5–10.1)
CHLORIDE SERPL-SCNC: 115 MMOL/L — HIGH (ref 96–108)
CO2 SERPL-SCNC: 19 MMOL/L — LOW (ref 22–31)
CREAT SERPL-MCNC: 2.03 MG/DL — HIGH (ref 0.5–1.3)
GLUCOSE BLDC GLUCOMTR-MCNC: 83 MG/DL — SIGNIFICANT CHANGE UP (ref 70–99)
GLUCOSE BLDC GLUCOMTR-MCNC: 94 MG/DL — SIGNIFICANT CHANGE UP (ref 70–99)
GLUCOSE SERPL-MCNC: 112 MG/DL — HIGH (ref 70–99)
HCT VFR BLD CALC: 29.2 % — LOW (ref 39–50)
HGB BLD-MCNC: 9.9 G/DL — LOW (ref 13–17)
INR BLD: 1.33 RATIO — HIGH (ref 0.88–1.16)
MAGNESIUM SERPL-MCNC: 1.8 MG/DL — SIGNIFICANT CHANGE UP (ref 1.6–2.6)
MCHC RBC-ENTMCNC: 29.5 PG — SIGNIFICANT CHANGE UP (ref 27–34)
MCHC RBC-ENTMCNC: 33.9 GM/DL — SIGNIFICANT CHANGE UP (ref 32–36)
MCV RBC AUTO: 86.9 FL — SIGNIFICANT CHANGE UP (ref 80–100)
NRBC # BLD: 0 /100 WBCS — SIGNIFICANT CHANGE UP (ref 0–0)
PHOSPHATE SERPL-MCNC: 3.4 MG/DL — SIGNIFICANT CHANGE UP (ref 2.5–4.5)
PLATELET # BLD AUTO: 375 K/UL — SIGNIFICANT CHANGE UP (ref 150–400)
POTASSIUM SERPL-MCNC: 3.4 MMOL/L — LOW (ref 3.5–5.3)
POTASSIUM SERPL-SCNC: 3.4 MMOL/L — LOW (ref 3.5–5.3)
PROTHROM AB SERPL-ACNC: 15 SEC — HIGH (ref 10–12.9)
RBC # BLD: 3.36 M/UL — LOW (ref 4.2–5.8)
RBC # FLD: 20.2 % — HIGH (ref 10.3–14.5)
SODIUM SERPL-SCNC: 143 MMOL/L — SIGNIFICANT CHANGE UP (ref 135–145)
WBC # BLD: 36.47 K/UL — HIGH (ref 3.8–10.5)
WBC # FLD AUTO: 36.47 K/UL — HIGH (ref 3.8–10.5)

## 2019-05-22 PROCEDURE — 49406 IMAGE CATH FLUID PERI/RETRO: CPT

## 2019-05-22 PROCEDURE — 99233 SBSQ HOSP IP/OBS HIGH 50: CPT

## 2019-05-22 RX ORDER — POTASSIUM CHLORIDE 20 MEQ
10 PACKET (EA) ORAL
Refills: 0 | Status: COMPLETED | OUTPATIENT
Start: 2019-05-22 | End: 2019-05-22

## 2019-05-22 RX ORDER — HEPARIN SODIUM 5000 [USP'U]/ML
5000 INJECTION INTRAVENOUS; SUBCUTANEOUS EVERY 12 HOURS
Refills: 0 | Status: DISCONTINUED | OUTPATIENT
Start: 2019-05-22 | End: 2019-06-04

## 2019-05-22 RX ORDER — MORPHINE SULFATE 50 MG/1
4 CAPSULE, EXTENDED RELEASE ORAL EVERY 8 HOURS
Refills: 0 | Status: DISCONTINUED | OUTPATIENT
Start: 2019-05-22 | End: 2019-05-23

## 2019-05-22 RX ORDER — MORPHINE SULFATE 50 MG/1
6 CAPSULE, EXTENDED RELEASE ORAL EVERY 8 HOURS
Refills: 0 | Status: DISCONTINUED | OUTPATIENT
Start: 2019-05-22 | End: 2019-05-23

## 2019-05-22 RX ORDER — SODIUM CHLORIDE 9 MG/ML
1000 INJECTION, SOLUTION INTRAVENOUS
Refills: 0 | Status: COMPLETED | OUTPATIENT
Start: 2019-05-23 | End: 2019-05-23

## 2019-05-22 RX ADMIN — CHLORHEXIDINE GLUCONATE 1 APPLICATION(S): 213 SOLUTION TOPICAL at 05:52

## 2019-05-22 RX ADMIN — NAFCILLIN 200 GRAM(S): 10 INJECTION, POWDER, FOR SOLUTION INTRAVENOUS at 17:25

## 2019-05-22 RX ADMIN — SODIUM CHLORIDE 75 MILLILITER(S): 9 INJECTION, SOLUTION INTRAVENOUS at 09:37

## 2019-05-22 RX ADMIN — Medication 1 MILLIGRAM(S): at 22:03

## 2019-05-22 RX ADMIN — PANTOPRAZOLE SODIUM 40 MILLIGRAM(S): 20 TABLET, DELAYED RELEASE ORAL at 05:52

## 2019-05-22 RX ADMIN — Medication 100 MILLIEQUIVALENT(S): at 08:51

## 2019-05-22 RX ADMIN — NAFCILLIN 200 GRAM(S): 10 INJECTION, POWDER, FOR SOLUTION INTRAVENOUS at 09:47

## 2019-05-22 RX ADMIN — FENTANYL CITRATE 50 MICROGRAM(S): 50 INJECTION INTRAVENOUS at 01:34

## 2019-05-22 RX ADMIN — FENTANYL CITRATE 50 MICROGRAM(S): 50 INJECTION INTRAVENOUS at 06:04

## 2019-05-22 RX ADMIN — Medication 100 MILLIGRAM(S): at 15:02

## 2019-05-22 RX ADMIN — Medication 2 MILLIGRAM(S): at 01:05

## 2019-05-22 RX ADMIN — Medication 1 MILLIGRAM(S): at 14:35

## 2019-05-22 RX ADMIN — NAFCILLIN 200 GRAM(S): 10 INJECTION, POWDER, FOR SOLUTION INTRAVENOUS at 05:56

## 2019-05-22 RX ADMIN — NAFCILLIN 200 GRAM(S): 10 INJECTION, POWDER, FOR SOLUTION INTRAVENOUS at 14:35

## 2019-05-22 RX ADMIN — NAFCILLIN 200 GRAM(S): 10 INJECTION, POWDER, FOR SOLUTION INTRAVENOUS at 22:03

## 2019-05-22 RX ADMIN — Medication 1 MILLIGRAM(S): at 05:51

## 2019-05-22 RX ADMIN — MORPHINE SULFATE 4 MILLIGRAM(S): 50 CAPSULE, EXTENDED RELEASE ORAL at 18:39

## 2019-05-22 RX ADMIN — Medication 100 MILLIEQUIVALENT(S): at 09:38

## 2019-05-22 RX ADMIN — FENTANYL CITRATE 50 MICROGRAM(S): 50 INJECTION INTRAVENOUS at 15:50

## 2019-05-22 RX ADMIN — Medication 100 MILLIEQUIVALENT(S): at 06:30

## 2019-05-22 RX ADMIN — Medication 100 MILLIGRAM(S): at 22:49

## 2019-05-22 RX ADMIN — Medication 100 MILLIGRAM(S): at 05:52

## 2019-05-22 RX ADMIN — PANTOPRAZOLE SODIUM 40 MILLIGRAM(S): 20 TABLET, DELAYED RELEASE ORAL at 17:16

## 2019-05-22 RX ADMIN — FENTANYL CITRATE 50 MICROGRAM(S): 50 INJECTION INTRAVENOUS at 06:19

## 2019-05-22 RX ADMIN — FENTANYL CITRATE 50 MICROGRAM(S): 50 INJECTION INTRAVENOUS at 11:15

## 2019-05-22 RX ADMIN — FENTANYL CITRATE 50 MICROGRAM(S): 50 INJECTION INTRAVENOUS at 15:23

## 2019-05-22 RX ADMIN — NAFCILLIN 200 GRAM(S): 10 INJECTION, POWDER, FOR SOLUTION INTRAVENOUS at 01:07

## 2019-05-22 RX ADMIN — HEPARIN SODIUM 5000 UNIT(S): 5000 INJECTION INTRAVENOUS; SUBCUTANEOUS at 17:16

## 2019-05-22 RX ADMIN — SODIUM CHLORIDE 42 MILLILITER(S): 9 INJECTION, SOLUTION INTRAVENOUS at 15:00

## 2019-05-22 RX ADMIN — MORPHINE SULFATE 4 MILLIGRAM(S): 50 CAPSULE, EXTENDED RELEASE ORAL at 18:19

## 2019-05-22 RX ADMIN — SODIUM CHLORIDE 75 MILLILITER(S): 9 INJECTION, SOLUTION INTRAVENOUS at 09:47

## 2019-05-22 RX ADMIN — FENTANYL CITRATE 50 MICROGRAM(S): 50 INJECTION INTRAVENOUS at 01:48

## 2019-05-22 RX ADMIN — FENTANYL CITRATE 50 MICROGRAM(S): 50 INJECTION INTRAVENOUS at 10:54

## 2019-05-22 NOTE — PROGRESS NOTE ADULT - PROBLEM SELECTOR PLAN 1
New CT shows more collection  s/p IR draiange of RUQ parahepatic collection (250 cc purulent fluid was drained   )   follow up on new c.s   check gent level and redose accordingly  cont current antibiotics

## 2019-05-22 NOTE — PROGRESS NOTE ADULT - SUBJECTIVE AND OBJECTIVE BOX
Patient seen and examined at bedside in no distress.  Reports abdominal discomfort.  Otherwise, without complaints.  +Flatus.  Denies fever, chills, chest pain, sob.     Vital Signs Last 24 Hrs  T(F): 97.9 (05-22-19 @ 07:16), Max: 98.4 (05-22-19 @ 00:01)  HR: 88 (05-22-19 @ 12:00)  BP: 134/81 (05-22-19 @ 12:00)  RR: 15 (05-22-19 @ 12:00)  SpO2: 100% (05-22-19 @ 12:00)    GENERAL: Awake, alert, NAD  CHEST/LUNG: Clear to auscultation bilaterally, respirations nonlabored  HEART: S1S2, RRR  ABDOMEN: Midline dressing c/d/i. JPs intact with minimal serous drainage, L upper drain c 185cc/24hrs, L lower drain c 12cc/24hrs, R drain c 12cc/24hrs. TTP upper abdomen. No rigidity  EXTREMITIES: No calf tenderness b/l, no pedal edema b/l     LABS:                        9.9    36.47 )-----------( 375      ( 22 May 2019 04:03 )             29.2     05-22    143  |  115<H>  |  38<H>  ----------------------------<  112<H>  3.4<L>   |  19<L>  |  2.03<H>    Ca    7.7<L>      22 May 2019 04:03  Phos  3.4     05-22  Mg     1.8     05-22      PT/INR - ( 22 May 2019 04:03 )   PT: 15.0 sec;   INR: 1.33 ratio         PTT - ( 22 May 2019 04:03 )  PTT:33.9 sec      Impression: 58M PMH ETOH abuse, pancreatitis, hep C a/w gastric perforation POD#6 s/p ex-lap, yusef patch repair, peritoneal lavage. Found to have retrogastric collection and/or lateral perihepatic/subcapsular collection drainage  Leukocytosis improving.   Plan:  - IR today for drainage of collection  - continue antibiotics per ID, monitor WBC  - NPO for procedure  - continue medical management per CCU team  - DVT ppx, GI ppx, incentive spirometer, increase activity with PT  - will d/w surgical attending

## 2019-05-22 NOTE — PROGRESS NOTE ADULT - SUBJECTIVE AND OBJECTIVE BOX
Patient s/p US guided RUQ collection drainage catheter placement    Operators: LOUIS Meier MD    Findings:  A 10.2fr MPC placed into RUQ large collection and aspirated 460mLs of purulent fluid.  The fluid sent to lab for analysis.  Post procedure US shows full decompression.  Hemostasis achieved.  The catheter was sutured down and DSD applied.       Complications: None.    Blood loss:  minimal        ASSESSMENT:  59 yo male s/p US guided RUQ collection drainage catheter placement.  Pt PMH ETOH abuse, pancreatitis, hep C a/w gastric perforation POD#6 s/p ex-lap, yusef patch repair, peritoneal lavage. Found to have lateral perihepatic/subcapsular collection drainage      PLAN:  -Monitor drainage output.  keep bulb compressed  -Continue abx  -f/u gs/cx, afb, fungal and ova and parasites results  -Management as per surgery and CCU team  -Nursing orders placed to flush catheter daily

## 2019-05-22 NOTE — PROGRESS NOTE ADULT - SUBJECTIVE AND OBJECTIVE BOX
Patient is a 58y old  Male who presents with a chief complaint of Abdominal pain, vomiting (23 May 2019 05:28)      INTERVAL HPI / OVERNIGHT EVENTS: doing ok ,s/p IR drainage of RUQ abscess    MEDICATIONS  (STANDING):  chlorhexidine 4% Liquid 1 Application(s) Topical <User Schedule>  dextrose 5% + lactated ringers. 1000 milliLiter(s) (75 mL/Hr) IV Continuous <Continuous>  heparin  Injectable 5000 Unit(s) SubCutaneous every 12 hours  LORazepam   Injectable   IV Push   LORazepam   Injectable 1 milliGRAM(s) IV Push every 8 hours  metroNIDAZOLE  IVPB 500 milliGRAM(s) IV Intermittent every 8 hours  morphine  - Injectable 2 milliGRAM(s) IV Push once  multivitamin/thiamine/folic acid in sodium chloride 0.9% 1000 milliLiter(s) (42 mL/Hr) IV Continuous <Continuous>  nafcillin  IVPB 2 Gram(s) IV Intermittent every 4 hours  nafcillin  IVPB      pantoprazole  Injectable 40 milliGRAM(s) IV Push every 12 hours    MEDICATIONS  (PRN):  LORazepam   Injectable 2 milliGRAM(s) IV Push every 2 hours PRN agitation/ restlessness  morphine  - Injectable 6 milliGRAM(s) IV Push every 8 hours PRN Severe Pain (7 - 10)  morphine  - Injectable 4 milliGRAM(s) IV Push every 8 hours PRN Moderate Pain (4 - 6)      Vital Signs Last 24 Hrs  Tmax; afebrile      Review of systems:  General : no fever /chills, +fatigue  CVS : no chest pain, palpitations  Lungs : no shortness of breath, cough  GI : + abdominal pain, No vomiting, diarrhea   : no dysuria, hematuria        PHYSICAL EXAM:  General :NAD  Constitutional: well-groomed, well-developed  Respiratory: CTAB/L  Cardiovascular: S1 and S2, RRR, no M/G/R  Gastrointestinal: s/p Ex lap , s/p drain  x 4   Extremities: No peripheral edema  Vascular: 2+ peripheral pulses  Skin: No rashes      LABS:                        9.9    36.47 )-----------( 375      ( 22 May 2019 04:03 )             29.2     05-22    143  |  115<H>  |  38<H>  ----------------------------<  112<H>  3.4<L>   |  19<L>  |  2.03<H>    Ca    7.7<L>      22 May 2019 04:03  Phos  3.4     05-22  Mg     1.8     05-22            MICROBIOLOGY:  RECENT CULTURES:  05-23 .Other RIGHT UPPER QUADRANT ABSCESS XXXX XXXX   Testing in progress    05-22 .Blood XXXX XXXX   No growth to date.    05-17 .Sputum XXXX   Moderate polymorphonuclear leukocytes per low power field  Rare Squamous epithelial cells per low power field  Numerous Gram Positive Cocci in Pairs and Chains per oil power field  Moderate Gram Negative Rods per oil power field  Few Gram Positive Cocci in Clusters per oil power field   Normal Respiratory Mariah present    05-16 .Blood Blood Culture PCR  Staphylococcus aureus   Growth in anaerobic bottle: Gram Positive Cocci in Clusters  Growth in aerobic bottle: Gram Positive Cocci in Clusters   Growth in aerobic and anaerobic bottles: Staphylococcus aureus  "Due to technical problems, Proteus sp. will Not be reported as part of  the BCID panel until further notice"  ***Blood Panel PCR results on this specimen are available  approximately 3 hours after the Gram stain result.***  Gram stain, PCR, and/or culture results may not always  correspond due to difference in methodologies.  ************************************************************  This PCR assay was performed using [employer].  The following targets are tested for: Enterococcus,  vancomycin resistant enterococci, Listeria monocytogenes,  coagulase negative staphylococci, S. aureus,  methicillin resistant S. aureus, Streptococcus agalactiae  (Group B), S. pneumoniae, S. pyogenes (Group A),  Acinetobacter baumannii, Enterobacter cloacae, E. coli,  Klebsiella oxytoca, K. pneumoniae, Proteus sp.,  Serratia marcescens, Haemophilus influenzae,  Neisseria meningitidis, Pseudomonas aeruginosa, Candida  albicans, C. glabrata, C krusei, C parapsilosis,  C. tropicalis and the KPC resistance gene.    05-16 .Urine XXXX XXXX   No growth          RADIOLOGY & ADDITIONAL STUDIES:

## 2019-05-22 NOTE — PROGRESS NOTE ADULT - SUBJECTIVE AND OBJECTIVE BOX
HPI:  58M active smoker PMH ETOH abuse, chronic pancreatitis, Hep C and gout, recently hospitalized in March for abdominal pain and back pain. Treated for acute on chronic pancreatitis, YUNIEL  (pre renal azotemia, NSAID AIN), R knee gouty flare. Pt now presents with abdominal pain, N/V x3 days. Pt was admitted to surgical service for sepsis, YUNIEL,  peritonitis due to perforated viscous. Pt taken to OR s/p ex lap, abdominal washout for purulent peritonitis, David patch for gastric perforation. Post op course with post op anemia Hb 5.5, hypokalemia, hypoglycemia. Extubated 5/17. Worsening leukocytosis    ## 24 hour events  s/p IR drainage of perihepatic abscess with 450 cc purulent drainage  leukocytosis improved today  afebrile  + BM  dangled at side of bed with PT      ## Labs:                          9.9    36.47 )-----------( 375      ( 22 May 2019 04:03 )             29.2       PT/INR - ( 22 May 2019 04:03 )   PT: 15.0 sec;   INR: 1.33 ratio    PTT - ( 22 May 2019 04:03 )  PTT:33.9 sec        05-22    143  |  115<H>  |  38<H>  ----------------------------<  112<H>  3.4<L>   |  19<L>  |  2.03<H>    Ca    7.7<L>      22 May 2019 04:03  Phos  3.4     05-22  Mg     1.8     05-22        CAPILLARY BLOOD GLUCOSE  POCT Blood Glucose.: 94 mg/dL (22 May 2019 13:13)    Culture - Sputum . (05.17.19 @ 00:44)    Specimen Source: .Sputum    Culture Results: Normal Respiratory Mariah present      Culture - Urine (05.16.19 @ 17:20)    Specimen Source: .Urine    Culture Results: No growth      Culture - Blood (05.16.19 @ 18:36)    Specimen Source: .Blood    Culture Results: Growth in aerobic and anaerobic bottles: Staphylococcus aureus    Culture - Blood (05.16.19 @ 18:36)    Specimen Source: .Blood    Organism: Blood Culture PCR    Organism: Staphylococcus aureus    Culture Results: Growth in aerobic and anaerobic bottles: Staphylococcus aureus      Culture - Body Fluid with Gram Stain (05.16.19 @ 09:25)    Specimen Source: Peritoneal peritoneal fluid c/s esw    Culture Results: Rare Staphylococcus aureus          ## Imaging:  no new imaging      ## Medications:  MEDICATIONS  (STANDING):  chlorhexidine 4% Liquid 1 Application(s) Topical <User Schedule>  dextrose 5% + lactated ringers. 1000 milliLiter(s) (75 mL/Hr) IV Continuous <Continuous>  heparin  Injectable 5000 Unit(s) SubCutaneous every 12 hours  LORazepam   Injectable 1 milliGRAM(s) IV Push every 8 hours  metroNIDAZOLE  IVPB 500 milliGRAM(s) IV Intermittent every 8 hours  multivitamin/thiamine/folic acid in sodium chloride 0.9% 1000 milliLiter(s) (42 mL/Hr) IV Continuous <Continuous>  nafcillin  IVPB 2 Gram(s) IV Intermittent every 4 hours  pantoprazole  Injectable 40 milliGRAM(s) IV Push every 12 hours          ## Vitals:  T(F): 97.9 (05-22-19 @ 07:16), Max: 98.4 (05-22-19 @ 00:01)  HR: 88 (05-22-19 @ 12:00)  BP: 134/81 (05-22-19 @ 12:00)  RR: 15 (05-22-19 @ 12:00)  SpO2: 100% (05-22-19 @ 12:00)        ## P/E:  Gen: awake, alert, lying in bed, no acute distress  Lungs: CTA b/l  Heart: RRRTachy  Abd: Soft + BS, 2 JPs on left and 2 JPs on right  Ext: No edema  Neuro: awake, alert, but confused at times    CENTRAL LINE: [ ] YES [x] NO  LOCATION:   DATE INSERTED:  REMOVE: [ ] YES [ ] NO      GARCIA: [ ] YES [x] NO    DATE INSERTED:  REMOVE:  [ ] YES [ ] NO      A-LINE:  [ ] YES [x] NO  LOCATION:   DATE INSERTED:  REMOVE:  [ ] YES [ ] NO  EXPLAIN:    CODE STATUS: [x ] full code  [ ] DNR  [ ] DNI  [ ] MOLST  Goals of care discussion: [ ] yes

## 2019-05-23 DIAGNOSIS — K65.1 PERITONEAL ABSCESS: ICD-10-CM

## 2019-05-23 LAB
ALBUMIN SERPL ELPH-MCNC: 0.8 G/DL — LOW (ref 3.3–5)
ALP SERPL-CCNC: 165 U/L — HIGH (ref 40–120)
ALT FLD-CCNC: <6 U/L — LOW (ref 12–78)
ANION GAP SERPL CALC-SCNC: 12 MMOL/L — SIGNIFICANT CHANGE UP (ref 5–17)
AST SERPL-CCNC: 10 U/L — LOW (ref 15–37)
BASOPHILS # BLD AUTO: 0.1 K/UL — SIGNIFICANT CHANGE UP (ref 0–0.2)
BASOPHILS NFR BLD AUTO: 0.5 % — SIGNIFICANT CHANGE UP (ref 0–2)
BILIRUB SERPL-MCNC: 1.1 MG/DL — SIGNIFICANT CHANGE UP (ref 0.2–1.2)
BUN SERPL-MCNC: 32 MG/DL — HIGH (ref 7–23)
CALCIUM SERPL-MCNC: 7.6 MG/DL — LOW (ref 8.5–10.1)
CHLORIDE SERPL-SCNC: 117 MMOL/L — HIGH (ref 96–108)
CO2 SERPL-SCNC: 19 MMOL/L — LOW (ref 22–31)
CREAT SERPL-MCNC: 1.92 MG/DL — HIGH (ref 0.5–1.3)
CULTURE RESULTS: SIGNIFICANT CHANGE UP
EOSINOPHIL # BLD AUTO: 0 K/UL — SIGNIFICANT CHANGE UP (ref 0–0.5)
EOSINOPHIL NFR BLD AUTO: 0 % — SIGNIFICANT CHANGE UP (ref 0–6)
GLUCOSE BLDC GLUCOMTR-MCNC: 104 MG/DL — HIGH (ref 70–99)
GLUCOSE BLDC GLUCOMTR-MCNC: 111 MG/DL — HIGH (ref 70–99)
GLUCOSE BLDC GLUCOMTR-MCNC: 87 MG/DL — SIGNIFICANT CHANGE UP (ref 70–99)
GLUCOSE BLDC GLUCOMTR-MCNC: 93 MG/DL — SIGNIFICANT CHANGE UP (ref 70–99)
GLUCOSE SERPL-MCNC: 99 MG/DL — SIGNIFICANT CHANGE UP (ref 70–99)
HCT VFR BLD CALC: 26.5 % — LOW (ref 39–50)
HGB BLD-MCNC: 9 G/DL — LOW (ref 13–17)
IMM GRANULOCYTES NFR BLD AUTO: 3.7 % — HIGH (ref 0–1.5)
LYMPHOCYTES # BLD AUTO: 1.55 K/UL — SIGNIFICANT CHANGE UP (ref 1–3.3)
LYMPHOCYTES # BLD AUTO: 7.4 % — LOW (ref 13–44)
MAGNESIUM SERPL-MCNC: 1.6 MG/DL — SIGNIFICANT CHANGE UP (ref 1.6–2.6)
MCHC RBC-ENTMCNC: 29.7 PG — SIGNIFICANT CHANGE UP (ref 27–34)
MCHC RBC-ENTMCNC: 34 GM/DL — SIGNIFICANT CHANGE UP (ref 32–36)
MCV RBC AUTO: 87.5 FL — SIGNIFICANT CHANGE UP (ref 80–100)
MONOCYTES # BLD AUTO: 1.16 K/UL — HIGH (ref 0–0.9)
MONOCYTES NFR BLD AUTO: 5.6 % — SIGNIFICANT CHANGE UP (ref 2–14)
NEUTROPHILS # BLD AUTO: 17.28 K/UL — HIGH (ref 1.8–7.4)
NEUTROPHILS NFR BLD AUTO: 82.8 % — HIGH (ref 43–77)
NIGHT BLUE STAIN TISS: SIGNIFICANT CHANGE UP
NRBC # BLD: 0 /100 WBCS — SIGNIFICANT CHANGE UP (ref 0–0)
PHOSPHATE SERPL-MCNC: 3.7 MG/DL — SIGNIFICANT CHANGE UP (ref 2.5–4.5)
PLATELET # BLD AUTO: 453 K/UL — HIGH (ref 150–400)
POTASSIUM SERPL-MCNC: 3.4 MMOL/L — LOW (ref 3.5–5.3)
POTASSIUM SERPL-SCNC: 3.4 MMOL/L — LOW (ref 3.5–5.3)
PROT SERPL-MCNC: 4.8 GM/DL — LOW (ref 6–8.3)
RBC # BLD: 3.03 M/UL — LOW (ref 4.2–5.8)
RBC # FLD: 20.2 % — HIGH (ref 10.3–14.5)
SODIUM SERPL-SCNC: 148 MMOL/L — HIGH (ref 135–145)
SPECIMEN SOURCE: SIGNIFICANT CHANGE UP
SPECIMEN SOURCE: SIGNIFICANT CHANGE UP
WBC # BLD: 20.87 K/UL — HIGH (ref 3.8–10.5)
WBC # FLD AUTO: 20.87 K/UL — HIGH (ref 3.8–10.5)

## 2019-05-23 PROCEDURE — 99233 SBSQ HOSP IP/OBS HIGH 50: CPT

## 2019-05-23 PROCEDURE — 74241: CPT | Mod: 26

## 2019-05-23 PROCEDURE — 99232 SBSQ HOSP IP/OBS MODERATE 35: CPT

## 2019-05-23 RX ORDER — MORPHINE SULFATE 50 MG/1
2 CAPSULE, EXTENDED RELEASE ORAL ONCE
Refills: 0 | Status: DISCONTINUED | OUTPATIENT
Start: 2019-05-23 | End: 2019-05-23

## 2019-05-23 RX ORDER — POTASSIUM CHLORIDE 20 MEQ
10 PACKET (EA) ORAL
Refills: 0 | Status: COMPLETED | OUTPATIENT
Start: 2019-05-23 | End: 2019-05-23

## 2019-05-23 RX ORDER — MORPHINE SULFATE 50 MG/1
2 CAPSULE, EXTENDED RELEASE ORAL EVERY 6 HOURS
Refills: 0 | Status: DISCONTINUED | OUTPATIENT
Start: 2019-05-23 | End: 2019-05-27

## 2019-05-23 RX ORDER — GENTAMICIN SULFATE 40 MG/ML
180 VIAL (ML) INJECTION ONCE
Refills: 0 | Status: COMPLETED | OUTPATIENT
Start: 2019-05-23 | End: 2019-05-23

## 2019-05-23 RX ORDER — MAGNESIUM SULFATE 500 MG/ML
1 VIAL (ML) INJECTION ONCE
Refills: 0 | Status: COMPLETED | OUTPATIENT
Start: 2019-05-23 | End: 2019-05-23

## 2019-05-23 RX ORDER — MORPHINE SULFATE 50 MG/1
4 CAPSULE, EXTENDED RELEASE ORAL EVERY 6 HOURS
Refills: 0 | Status: DISCONTINUED | OUTPATIENT
Start: 2019-05-23 | End: 2019-05-27

## 2019-05-23 RX ADMIN — NAFCILLIN 200 GRAM(S): 10 INJECTION, POWDER, FOR SOLUTION INTRAVENOUS at 02:03

## 2019-05-23 RX ADMIN — Medication 100 MILLIGRAM(S): at 13:46

## 2019-05-23 RX ADMIN — CHLORHEXIDINE GLUCONATE 1 APPLICATION(S): 213 SOLUTION TOPICAL at 06:15

## 2019-05-23 RX ADMIN — MORPHINE SULFATE 4 MILLIGRAM(S): 50 CAPSULE, EXTENDED RELEASE ORAL at 14:33

## 2019-05-23 RX ADMIN — NAFCILLIN 200 GRAM(S): 10 INJECTION, POWDER, FOR SOLUTION INTRAVENOUS at 21:02

## 2019-05-23 RX ADMIN — Medication 100 GRAM(S): at 14:33

## 2019-05-23 RX ADMIN — NAFCILLIN 200 GRAM(S): 10 INJECTION, POWDER, FOR SOLUTION INTRAVENOUS at 09:40

## 2019-05-23 RX ADMIN — MORPHINE SULFATE 6 MILLIGRAM(S): 50 CAPSULE, EXTENDED RELEASE ORAL at 16:44

## 2019-05-23 RX ADMIN — NAFCILLIN 200 GRAM(S): 10 INJECTION, POWDER, FOR SOLUTION INTRAVENOUS at 13:46

## 2019-05-23 RX ADMIN — MORPHINE SULFATE 6 MILLIGRAM(S): 50 CAPSULE, EXTENDED RELEASE ORAL at 17:05

## 2019-05-23 RX ADMIN — NAFCILLIN 200 GRAM(S): 10 INJECTION, POWDER, FOR SOLUTION INTRAVENOUS at 17:31

## 2019-05-23 RX ADMIN — HEPARIN SODIUM 5000 UNIT(S): 5000 INJECTION INTRAVENOUS; SUBCUTANEOUS at 05:01

## 2019-05-23 RX ADMIN — SODIUM CHLORIDE 42 MILLILITER(S): 9 INJECTION, SOLUTION INTRAVENOUS at 16:50

## 2019-05-23 RX ADMIN — Medication 1 MILLIGRAM(S): at 21:02

## 2019-05-23 RX ADMIN — MORPHINE SULFATE 2 MILLIGRAM(S): 50 CAPSULE, EXTENDED RELEASE ORAL at 10:30

## 2019-05-23 RX ADMIN — Medication 100 MILLIEQUIVALENT(S): at 16:44

## 2019-05-23 RX ADMIN — HEPARIN SODIUM 5000 UNIT(S): 5000 INJECTION INTRAVENOUS; SUBCUTANEOUS at 17:30

## 2019-05-23 RX ADMIN — MORPHINE SULFATE 4 MILLIGRAM(S): 50 CAPSULE, EXTENDED RELEASE ORAL at 03:00

## 2019-05-23 RX ADMIN — Medication 100 MILLIGRAM(S): at 06:15

## 2019-05-23 RX ADMIN — PANTOPRAZOLE SODIUM 40 MILLIGRAM(S): 20 TABLET, DELAYED RELEASE ORAL at 17:30

## 2019-05-23 RX ADMIN — PANTOPRAZOLE SODIUM 40 MILLIGRAM(S): 20 TABLET, DELAYED RELEASE ORAL at 05:01

## 2019-05-23 RX ADMIN — Medication 1 MILLIGRAM(S): at 06:15

## 2019-05-23 RX ADMIN — MORPHINE SULFATE 4 MILLIGRAM(S): 50 CAPSULE, EXTENDED RELEASE ORAL at 13:44

## 2019-05-23 RX ADMIN — Medication 200 MILLIGRAM(S): at 13:46

## 2019-05-23 RX ADMIN — Medication 100 MILLIEQUIVALENT(S): at 14:33

## 2019-05-23 RX ADMIN — MORPHINE SULFATE 4 MILLIGRAM(S): 50 CAPSULE, EXTENDED RELEASE ORAL at 02:45

## 2019-05-23 RX ADMIN — MORPHINE SULFATE 2 MILLIGRAM(S): 50 CAPSULE, EXTENDED RELEASE ORAL at 10:40

## 2019-05-23 RX ADMIN — Medication 100 MILLIGRAM(S): at 21:02

## 2019-05-23 RX ADMIN — SODIUM CHLORIDE 75 MILLILITER(S): 9 INJECTION, SOLUTION INTRAVENOUS at 02:02

## 2019-05-23 RX ADMIN — Medication 1 MILLIGRAM(S): at 14:30

## 2019-05-23 RX ADMIN — NAFCILLIN 200 GRAM(S): 10 INJECTION, POWDER, FOR SOLUTION INTRAVENOUS at 05:00

## 2019-05-23 NOTE — CHART NOTE - NSCHARTNOTEFT_GEN_A_CORE
HPI  58M PMH alcohol abuse, chronic pancreatitis, Hep C, gout, + smoker presents for abdominal pain, nausea, and emesis x3 days. Pt was recently hospitalized in Mar (2 months ago) for abdominal pain and back pain (for which he was taking Advil at home) treated for acute on chronic pancreatitis, YUNIEL (pre renal azotemia, NSAID AIN) with course complicated by right knee gouty flare. Presents again this time with abdominal pain/n/v. Admitted to surgical service for sepsis, acute renal failure, peritonitis due to perforated viscous. Taken to OR s/p ex lap, abdominal washout for purulent peritonitis, yusef patch for gastric perforation. Transferred to ICU post op intubated 5/16, septic on pressors. Post op pt with anemia Hb 5.5, hypokalemia, hypoglycemia. Post op course with post op anemia Hb 5.5, hypokalemia, hypoglycemia. Extubated 5/17. Worsening leukocytosis post op.   Patient  with staph bacteremia, staph peritonitis. Post op course complicated with worsening leukocytosis found to have lateral perihepatic collection and retrogastric collection .IR drainage 5/22 of perihepatic abscess with purulent drainage leukocytosis improved today. Remains on ABX for peritonitis and leukocytosis per ID.  Patient had upper GI series performed today  No leak or extravasation identified. Mild prominence of the proximal  small bowel loops compatible with postsurgical ileus. [place] Sump removed per surgery. Started on clear liquid today. Patient remains on CiWA protocol. Stable Patient seen and examined by ICU attending and stable to transfer to non monitored bed enhanced observation.     Report given to Dr. Murray, surgery service    Katelyn Davey NP-C   critical care

## 2019-05-23 NOTE — PROGRESS NOTE ADULT - SUBJECTIVE AND OBJECTIVE BOX
Patient is a 58y old  Male who presents with a chief complaint of Abdominal pain, vomiting (23 May 2019 05:28)      INTERVAL HPI / OVERNIGHT EVENTS:  doing ok     MEDICATIONS  (STANDING):  chlorhexidine 4% Liquid 1 Application(s) Topical <User Schedule>  dextrose 5% + lactated ringers. 1000 milliLiter(s) (75 mL/Hr) IV Continuous <Continuous>  heparin  Injectable 5000 Unit(s) SubCutaneous every 12 hours  LORazepam   Injectable   IV Push   LORazepam   Injectable 1 milliGRAM(s) IV Push every 8 hours  magnesium sulfate  IVPB 1 Gram(s) IV Intermittent once  metroNIDAZOLE  IVPB 500 milliGRAM(s) IV Intermittent every 8 hours  multivitamin/thiamine/folic acid in sodium chloride 0.9% 1000 milliLiter(s) (42 mL/Hr) IV Continuous <Continuous>  nafcillin  IVPB 2 Gram(s) IV Intermittent every 4 hours  nafcillin  IVPB      pantoprazole  Injectable 40 milliGRAM(s) IV Push every 12 hours  potassium chloride  10 mEq/100 mL IVPB 10 milliEquivalent(s) IV Intermittent every 1 hour    MEDICATIONS  (PRN):  LORazepam   Injectable 2 milliGRAM(s) IV Push every 2 hours PRN agitation/ restlessness  morphine  - Injectable 6 milliGRAM(s) IV Push every 8 hours PRN Severe Pain (7 - 10)  morphine  - Injectable 4 milliGRAM(s) IV Push every 8 hours PRN Moderate Pain (4 - 6)      Vital Signs Last 24 Hrs  T(C): 36.5 (23 May 2019 08:22), Max: 37.1 (23 May 2019 05:00)  T(F): 97.7 (23 May 2019 08:22), Max: 98.7 (23 May 2019 05:00)  HR: 93 (23 May 2019 12:00) (80 - 101)  BP: 137/82 (23 May 2019 12:00) (112/71 - 173/99)  BP(mean): 96 (23 May 2019 12:00) (81 - 122)  RR: 15 (23 May 2019 12:00) (11 - 25)  SpO2: 99% (23 May 2019 12:00) (90% - 100%)    Review of systems:  General : no fever /chills,fatigue  CVS : no chest pain, palpitations  Lungs : no shortness of breath, cough  GI : + abdominal pain, vomiting, diarrhea   : no dysuria, hematuria        PHYSICAL EXAM:  General :NAD  Constitutional: well-groomed, well-developed  Respiratory: CTAB/L  Cardiovascular: S1 and S2, RRR, no M/G/R  Gastrointestinal: BS+, soft, NT/ND  Extremities: No peripheral edema  Vascular: 2+ peripheral pulses  Skin: No rashes      LABS:                        9.0    20.87 )-----------( 453      ( 23 May 2019 10:57 )             26.5     05-23    148<H>  |  117<H>  |  32<H>  ----------------------------<  99  3.4<L>   |  19<L>  |  1.92<H>    Ca    7.6<L>      23 May 2019 10:57  Phos  3.7     05-23  Mg     1.6     05-23    TPro  4.8<L>  /  Alb  0.8<L>  /  TBili  1.1  /  DBili  x   /  AST  10<L>  /  ALT  <6<L>  /  AlkPhos  165<H>  05-23          MICROBIOLOGY:  RECENT CULTURES:  05-23 .Other RIGHT UPPER QUADRANT ABSCESS XXXX XXXX   Testing in progress    05-22 .Blood XXXX XXXX   No growth to date.    05-17 .Sputum XXXX   Moderate polymorphonuclear leukocytes per low power field  Rare Squamous epithelial cells per low power field  Numerous Gram Positive Cocci in Pairs and Chains per oil power field  Moderate Gram Negative Rods per oil power field  Few Gram Positive Cocci in Clusters per oil power field   Normal Respiratory Mariah present    05-16 .Blood Blood Culture PCR  Staphylococcus aureus   Growth in anaerobic bottle: Gram Positive Cocci in Clusters  Growth in aerobic bottle: Gram Positive Cocci in Clusters   Growth in aerobic and anaerobic bottles: Staphylococcus aureus  "Due to technical problems, Proteus sp. will Not be reported as part of  the BCID panel until further notice"  ***Blood Panel PCR results on this specimen are available  approximately 3 hours after the Gram stain result.***  Gram stain, PCR, and/or culture results may not always  correspond due to difference in methodologies.  ************************************************************  This PCR assay was performed using [employer].  The following targets are tested for: Enterococcus,  vancomycin resistant enterococci, Listeria monocytogenes,  coagulase negative staphylococci, S. aureus,  methicillin resistant S. aureus, Streptococcus agalactiae  (Group B), S. pneumoniae, S. pyogenes (Group A),  Acinetobacter baumannii, Enterobacter cloacae, E. coli,  Klebsiella oxytoca, K. pneumoniae, Proteus sp.,  Serratia marcescens, Haemophilus influenzae,  Neisseria meningitidis, Pseudomonas aeruginosa, Candida  albicans, C. glabrata, C krusei, C parapsilosis,  C. tropicalis and the KPC resistance gene.    05-16 .Urine XXXX XXXX   No growth          RADIOLOGY & ADDITIONAL STUDIES: Patient is a 58y old  Male who presents with a chief complaint of Abdominal pain, vomiting (23 May 2019 05:28)      INTERVAL HPI / OVERNIGHT EVENTS:  abdo pain+    MEDICATIONS  (STANDING):  chlorhexidine 4% Liquid 1 Application(s) Topical <User Schedule>  dextrose 5% + lactated ringers. 1000 milliLiter(s) (75 mL/Hr) IV Continuous <Continuous>  heparin  Injectable 5000 Unit(s) SubCutaneous every 12 hours  LORazepam   Injectable   IV Push   LORazepam   Injectable 1 milliGRAM(s) IV Push every 8 hours  magnesium sulfate  IVPB 1 Gram(s) IV Intermittent once  metroNIDAZOLE  IVPB 500 milliGRAM(s) IV Intermittent every 8 hours  multivitamin/thiamine/folic acid in sodium chloride 0.9% 1000 milliLiter(s) (42 mL/Hr) IV Continuous <Continuous>  nafcillin  IVPB 2 Gram(s) IV Intermittent every 4 hours  nafcillin  IVPB      pantoprazole  Injectable 40 milliGRAM(s) IV Push every 12 hours  potassium chloride  10 mEq/100 mL IVPB 10 milliEquivalent(s) IV Intermittent every 1 hour    MEDICATIONS  (PRN):  LORazepam   Injectable 2 milliGRAM(s) IV Push every 2 hours PRN agitation/ restlessness  morphine  - Injectable 6 milliGRAM(s) IV Push every 8 hours PRN Severe Pain (7 - 10)  morphine  - Injectable 4 milliGRAM(s) IV Push every 8 hours PRN Moderate Pain (4 - 6)      Vital Signs Last 24 Hrs  T(C): 36.5 (23 May 2019 08:22), Max: 37.1 (23 May 2019 05:00)  T(F): 97.7 (23 May 2019 08:22), Max: 98.7 (23 May 2019 05:00)  HR: 93 (23 May 2019 12:00) (80 - 101)  BP: 137/82 (23 May 2019 12:00) (112/71 - 173/99)  BP(mean): 96 (23 May 2019 12:00) (81 - 122)  RR: 15 (23 May 2019 12:00) (11 - 25)  SpO2: 99% (23 May 2019 12:00) (90% - 100%)    Review of systems:  General : no fever /chills,fatigue  CVS : no chest pain, palpitations  Lungs : no shortness of breath, cough  GI : + abdominal pain, No vomiting, diarrhea   : no dysuria, hematuria        PHYSICAL EXAM:  General :NAD  Constitutional: well-groomed, well-developed  Respiratory: CTAB/L  Cardiovascular: S1 and S2, RRR, no M/G/R  Gastrointestinal: s/p ex alp ,drains+  Extremities: No peripheral edema  Vascular: 2+ peripheral pulses  Skin: No rashes      LABS:                        9.0    20.87 )-----------( 453      ( 23 May 2019 10:57 )             26.5     05-23    148<H>  |  117<H>  |  32<H>  ----------------------------<  99  3.4<L>   |  19<L>  |  1.92<H>    Ca    7.6<L>      23 May 2019 10:57  Phos  3.7     05-23  Mg     1.6     05-23    TPro  4.8<L>  /  Alb  0.8<L>  /  TBili  1.1  /  DBili  x   /  AST  10<L>  /  ALT  <6<L>  /  AlkPhos  165<H>  05-23          MICROBIOLOGY:  RECENT CULTURES:  05-23 .Other RIGHT UPPER QUADRANT ABSCESS XXXX XXXX   Testing in progress    05-22 .Blood XXXX XXXX   No growth to date.    05-17 .Sputum XXXX   Moderate polymorphonuclear leukocytes per low power field  Rare Squamous epithelial cells per low power field  Numerous Gram Positive Cocci in Pairs and Chains per oil power field  Moderate Gram Negative Rods per oil power field  Few Gram Positive Cocci in Clusters per oil power field   Normal Respiratory Mariah present    05-16 .Blood Blood Culture PCR  Staphylococcus aureus   Growth in anaerobic bottle: Gram Positive Cocci in Clusters  Growth in aerobic bottle: Gram Positive Cocci in Clusters   Growth in aerobic and anaerobic bottles: Staphylococcus aureus  "Due to technical problems, Proteus sp. will Not be reported as part of  the BCID panel until further notice"  ***Blood Panel PCR results on this specimen are available  approximately 3 hours after the Gram stain result.***  Gram stain, PCR, and/or culture results may not always  correspond due to difference in methodologies.  ************************************************************  This PCR assay was performed using [employer].  The following targets are tested for: Enterococcus,  vancomycin resistant enterococci, Listeria monocytogenes,  coagulase negative staphylococci, S. aureus,  methicillin resistant S. aureus, Streptococcus agalactiae  (Group B), S. pneumoniae, S. pyogenes (Group A),  Acinetobacter baumannii, Enterobacter cloacae, E. coli,  Klebsiella oxytoca, K. pneumoniae, Proteus sp.,  Serratia marcescens, Haemophilus influenzae,  Neisseria meningitidis, Pseudomonas aeruginosa, Candida  albicans, C. glabrata, C krusei, C parapsilosis,  C. tropicalis and the KPC resistance gene.    05-16 .Urine XXXX XXXX   No growth          RADIOLOGY & ADDITIONAL STUDIES:

## 2019-05-23 NOTE — CHART NOTE - NSCHARTNOTEFT_GEN_A_CORE
< from: Xray Upper GI Series (05.23.19 @ 11:33) >      EXAM:  UGI SINGLE WKUB                            PROCEDURE DATE:  05/23/2019          INTERPRETATION:  CLINICAL INFORMATION: Status post repair of perforated   viscus, evaluate for leak    PROCEDURE:     Single contrast upper GI study was performed using [employer]. Multiple   digital spot films were obtained during the study.    FINDINGS:      image reveals 2 surgical drains in the midabdomen and a third   surgical drain in the left upper quadrant. NG tube tip within the   proximal stomach. Surgical clips overlie the midline abdomen. Pigtail   catheter overlies the right upper quadrant.    The esophagus and esophagogastric junction are within normal limits.  The   fundus of the stomach is well outlined and there is no evidence of leak   or extravasation. Contrast medium flows into the proximal small bowel   which is mildly prominent, suggestive of postsurgical ileus.    IMPRESSION:     No leak or extravasation identified. Mild prominence of the proximal   small bowel loops compatible with postsurgical ileus.    KURT SPAIN M.D., ATTENDING RADIOLOGIST  This document has been electronically signed. May 23 2019 12:19PM            Above result discussed with Dr. Murray.  He said to remove NGT & start clear liquids.    Discussed with JASON Zepeda in CCU.

## 2019-05-23 NOTE — CHART NOTE - NSCHARTNOTEFT_GEN_A_CORE
Per discussion with Dr. Murray, UGI series ordered to evaluate for any leaks.  If n, no leaks, will pull NGT & start on clear liquids.

## 2019-05-23 NOTE — PROGRESS NOTE ADULT - PROBLEM SELECTOR PLAN 2
sec to above  repeat blood c/s pending  ECHO to r/o IE sec to above  repeat blood c/s pending  ECHO to R/O IE negative

## 2019-05-23 NOTE — PROGRESS NOTE ADULT - SUBJECTIVE AND OBJECTIVE BOX
Postoperative Day #:7  Patient seen and examined at bedside in no distress. +flatus/BM. Requesting NGT removal as it is very uncomfortable ,also starting diet. Pt had percutaneous drainage og collection yesterday. Otherwise, without complaints. Denies fever, chills, chest pain, sob.     T(F): 98.7 (05-23-19 @ 05:00), Max: 98.7 (05-23-19 @ 05:00)  HR: 87 (05-23-19 @ 07:00) (80 - 104)  BP: 120/76 (05-23-19 @ 07:00) (108/62 - 173/99)  RR: 12 (05-23-19 @ 07:00) (11 - 31)  SpO2: 99% (05-23-19 @ 07:00) (90% - 100%)  Wt(kg): --  CAPILLARY BLOOD GLUCOSE      POCT Blood Glucose.: 104 mg/dL (23 May 2019 05:19)  POCT Blood Glucose.: 87 mg/dL (23 May 2019 00:02)  POCT Blood Glucose.: 94 mg/dL (22 May 2019 13:13)    GENERAL: Awake, alert, NAD  CHEST/LUNG: Clear to auscultation bilaterally, respirations nonlabored  HEART: S1S2, RRR  ABDOMEN: Midline dressing c/d/i. JPs intact with minimal serous drainage, L upper drain w/ 45cc/24hrs, L lower drain w/ 7cc/24hrs, R drain w/ 10cc/24hrs. TTP upper abdomen. No rigidity. percutaneous drain flushed easily with 10ccNS with good return 370cc/2hrs. ngt with nonbilious output  EXTREMITIES: No calf tenderness b/l, no pedal edema b/l     LABS:                        9.9    36.47 )-----------( 375      ( 22 May 2019 04:03 )             29.2     05-22    143  |  115<H>  |  38<H>  ----------------------------<  112<H>  3.4<L>   |  19<L>  |  2.03<H>    Ca    7.7<L>      22 May 2019 04:03  Phos  3.4     05-22  Mg     1.8     05-22      PT/INR - ( 22 May 2019 04:03 )   PT: 15.0 sec;   INR: 1.33 ratio         PTT - ( 22 May 2019 04:03 )  PTT:33.9 sec  I&O's Detail    22 May 2019 07:01  -  23 May 2019 07:00  --------------------------------------------------------  IN:    dextrose 5% + lactated ringers.: 1800 mL    multivitamin/thiamine/folic acid in sodium chloride 0.9%: 126 mL    multivitamin/thiamine/folic acid in sodium chloride 0.9%: 588 mL    Solution: 200 mL    Solution: 300 mL    Solution: 600 mL  Total IN: 3614 mL    OUT:    Bulb: 370 mL    Drain: 45 mL    Drain: 7 mL    Drain: 10 mL    Nasoenteral Tube: 150 mL    Voided: 205 mL  Total OUT: 787 mL    Total NET: 2827 mL        Culture Results:   No growth to date. (05-22 @ 00:56)  Culture Results:   No growth to date. (05-22 @ 00:56)  Culture Results:   Normal Respiratory Mariah present (05-17 @ 00:44)  Culture Results:   Growth in aerobic and anaerobic bottles: Staphylococcus aureus  "Due to technical problems, Proteus sp. will Not be reported as part of  the BCID panel until further notice"  ***Blood Panel PCR results on this specimen are available  approximately 3 hours after the Gram stain result.***  Gram stain, PCR, and/or culture results may not always  correspond due to difference in methodologies.  ************************************************************  This PCR assay was performed using [employer].  The following targets are tested for: Enterococcus,  vancomycin resistant enterococci, Listeria monocytogenes,  coagulase negative staphylococci, S. aureus,  methicillin resistant S. aureus, Streptococcus agalactiae  (Group B), S. pneumoniae, S. pyogenes (Group A),  Acinetobacter baumannii, Enterobacter cloacae, E. coli,  Klebsiella oxytoca, K. pneumoniae, Proteus sp.,  Serratia marcescens, Haemophilus influenzae,  Neisseria meningitidis, Pseudomonas aeruginosa, Candida  albicans, C. glabrata, C krusei, C parapsilosis,  C. tropicalis and the KPC resistance gene. (05-16 @ 18:36)  Culture Results:   Growth in aerobic and anaerobic bottles: Staphylococcus aureus  See previous culture  [id number] (05-16 @ 18:36)  Culture Results:   No growth (05-16 @ 17:20)  Culture Results:   Rare Staphylococcus aureus (05-16 @ 09:25)      Impression: 58M PMH ETOH abuse, pancreatitis, hep C a/w gastric perforation POD#7 s/p ex-lap, yusef patch repair, peritoneal lavage. Found to have retrogastric collection and/or lateral perihepatic/subcapsular collection drainage s/sp perc drainage  Leukocytosis improving.   Plan:  - cont drains to self suction, monitor output  - continue antibiotics per ID, trend WBC  - NPO for procedure  - continue medical management per CCU team  - DVT ppx, GI ppx, incentive spirometer, increase activity with PT  - will d/w surgical attending

## 2019-05-23 NOTE — CHART NOTE - NSCHARTNOTEFT_GEN_A_CORE
Pt s/p repair of perfotated viscus. S/P upper GI study -> no leak or extravasation identified. Mild prominence of the proximal small bowel loops compatible w/ post surgical ileus. Pt NPO x 7 days.     Factors impacting intake: [ ] none [ ] nausea  [ ] vomiting [ ] diarrhea [ ] constipation  [ ]chewing problems [ ] swallowing issues  [x ] other: NPO x 7 days    Diet Prescription: Diet, Clear Liquid (05-23-19 @ 14:20)    Intake: 0 % NGT to LWS dc'd today at 2:00. To begin clear liquids at bedside untouched at this time.     Current Weight: 5/23 - 162.4 (73.7 kg) 2+ (R) arm, hand, foot, 1+ (L) hand, foot 5/16 - 138.2 (62.7 kg)  % Weight Change -  ????    Nutrition focused physical exam conducted ;   Subcutaneous fat loss: [moderate ] Orbital fat pads region, [moderate ]Buccal fat region, [severe ]Triceps region,  [severe ]Ribs region.  Muscle wasting: [moderate ]Temples region, [moderate ]Clavicle region, [moderate ]Shoulder region, [unable ]Scapula region, [edema ]Interosseous region,  [edema ]thigh region, [edema ]Calf region    Pertinent Medications: MEDICATIONS  (STANDING):  chlorhexidine 4% Liquid 1 Application(s) Topical <User Schedule>  dextrose 5% + lactated ringers. 1000 milliLiter(s) (75 mL/Hr) IV Continuous <Continuous>  heparin  Injectable 5000 Unit(s) SubCutaneous every 12 hours  LORazepam   Injectable   IV Push   LORazepam   Injectable 1 milliGRAM(s) IV Push every 8 hours  metroNIDAZOLE  IVPB 500 milliGRAM(s) IV Intermittent every 8 hours  multivitamin/thiamine/folic acid in sodium chloride 0.9% 1000 milliLiter(s) (42 mL/Hr) IV Continuous <Continuous>  nafcillin  IVPB 2 Gram(s) IV Intermittent every 4 hours  nafcillin  IVPB      pantoprazole  Injectable 40 milliGRAM(s) IV Push every 12 hours  potassium chloride  10 mEq/100 mL IVPB 10 milliEquivalent(s) IV Intermittent every 1 hour    MEDICATIONS  (PRN):  LORazepam   Injectable 2 milliGRAM(s) IV Push every 2 hours PRN agitation/ restlessness  morphine  - Injectable 6 milliGRAM(s) IV Push every 8 hours PRN Severe Pain (7 - 10)  morphine  - Injectable 4 milliGRAM(s) IV Push every 8 hours PRN Moderate Pain (4 - 6)    Pertinent Labs: 05-23 Na148 mmol/L<H> Glu 99 mg/dL K+ 3.4 mmol/L<L> Cr  1.92 mg/dL<H> BUN 32 mg/dL<H> 05-23 Phos 3.7 mg/dL 05-23 Alb 0.8 g/dL<L>     CAPILLARY BLOOD GLUCOSE      POCT Blood Glucose.: 93 mg/dL (23 May 2019 14:00)  POCT Blood Glucose.: 104 mg/dL (23 May 2019 05:19)  POCT Blood Glucose.: 87 mg/dL (23 May 2019 00:02)    Skin: WDL      · Nutrient: Malnutrition; Severe malnutrition in the context of chronic illness & Social circumstances  · Etiology: Inadequate energy & protein intake related to hx ETOHA & chronic pancreatitis  · Signs/Symptoms: severe fat loss & muscle wasting  · Nutrition Intervention: Meals and Snack; Medical Food Supplements  · Meals and Snacks: Other (specify); adv po diet when medically feasible Clear liquids to full liquids to Soft  · Medical and Food Supplements: Commercial beverage; Recommend Ensure Enlive 2 x day(700kcal & 40gm protein)  · Goal/Expected Outcome: Pt to tolerate po diet without GI distress when po diet advanced   )      Previous Nutrition Diagnosis:   [ ] Inadequate Energy Intake [ ]Inadequate Oral Intake [ ] Excessive Energy Intake   [ ] Underweight [ ] Increased Nutrient Needs [ ] Overweight/Obesity   [ ] Altered GI Function [ ] Unintended Weight Loss [ ] Food & Nutrition Related Knowledge Deficit [ ] Malnutrition     Previous Goal: not met    Nutrition Diagnosis is [x ] ongoing  [ ] resolved [ ] not applicable     New Nutrition Diagnosis: [x ] not applicable       Interventions:   Recommend  [x ] Change Diet To: Advance diet as medically feasible clear liquid-> f. liquid -> soft  [ ] Nutrition Supplement  [ ] Nutrition Support  [ ] Other:     Monitoring and Evaluation:   [x ] PO intake [ x ] Tolerance to diet prescription [ x ] weights [ x ] labs[ x ] follow up per protocol  [ ] other:

## 2019-05-23 NOTE — PROGRESS NOTE ADULT - SUBJECTIVE AND OBJECTIVE BOX
HPI:  58M active smoker PMH ETOH abuse, chronic pancreatitis, Hep C and gout, recently hospitalized in March for abdominal pain and back pain. Treated for acute on chronic pancreatitis, YUNIEL  (pre renal azotemia, NSAID AIN), R knee gouty flare. Pt now presents with abdominal pain, N/V x3 days. Pt was admitted to surgical service for sepsis, YUNIEL,  peritonitis due to perforated viscous. Pt taken to OR s/p ex lap, abdominal washout for purulent peritonitis, David patch for gastric perforation. Post op course with post op anemia Hb 5.5, hypokalemia, hypoglycemia. Extubated 5/17. Worsening leukocytosis post op.       ## 24 hour events  s/p IR drainage yesterday of perihepatic abscess with purulent drainage   leukocytosis improved today  afebrile  + BM  had upper GI series performed today      ## Labs:                        9.0    20.87 )-----------( 453      ( 23 May 2019 10:57 )             26.5       05-23    148<H>  |  117<H>  |  32<H>  --------------------------------------<  99  3.4<L>   |  19<L>  |  1.92<H>      Ca    7.6<L>      23 May 2019 10:57  Phos  3.7     05-23  Mg     1.6     05-23      TPro  4.8<L>  /  Alb  0.8<L>  /  TBili  1.1  /  AST  10<L>  /  ALT  <6<L>  /  AlkPhos  165<H>  05-23       PT/INR - ( 22 May 2019 04:03 )   PT: 15.0 sec;   INR: 1.33 ratio    PTT - ( 22 May 2019 04:03 )  PTT:33.9 sec      Culture - Blood (05.22.19 @ 00:56)    Specimen Source: .Blood    Culture Results: No growth to date.      Culture - Sputum . (05.17.19 @ 00:44)    Specimen Source: .Sputum    Culture Results: Normal Respiratory Mariah present      Culture - Urine (05.16.19 @ 17:20)    Specimen Source: .Urine    Culture Results: No growth      Culture - Blood (05.16.19 @ 18:36)    Specimen Source: .Blood    Culture Results: Growth in aerobic and anaerobic bottles: Staphylococcus aureus    Culture - Blood (05.16.19 @ 18:36)    Specimen Source: .Blood    Organism: Blood Culture PCR    Organism: Staphylococcus aureus    Culture Results: Growth in aerobic and anaerobic bottles: Staphylococcus aureus      Culture - Body Fluid with Gram Stain (05.16.19 @ 09:25)    Specimen Source: Peritoneal peritoneal fluid c/s esw    Culture Results: Rare Staphylococcus aureus          ## Imaging:  no new imaging      ## Medications:  MEDICATIONS  (STANDING):  chlorhexidine 4% Liquid 1 Application(s) Topical <User Schedule>  dextrose 5% + lactated ringers. 1000 milliLiter(s) (75 mL/Hr) IV Continuous <Continuous>  heparin  Injectable 5000 Unit(s) SubCutaneous every 12 hours  LORazepam   Injectable 1 milliGRAM(s) IV Push every 8 hours  metroNIDAZOLE  IVPB 500 milliGRAM(s) IV Intermittent every 8 hours  nafcillin  IVPB 2 Gram(s) IV Intermittent every 4 hours  pantoprazole  Injectable 40 milliGRAM(s) IV Push every 12 hours    MEDICATIONS  (PRN):  LORazepam   Injectable 2 milliGRAM(s) IV Push every 2 hours PRN agitation/ restlessness  morphine  - Injectable 6 milliGRAM(s) IV Push every 8 hours PRN Severe Pain (7 - 10)  morphine  - Injectable 4 milliGRAM(s) IV Push every 8 hours PRN Moderate Pain (4 - 6)          ## Vitals:  ICU Vital Signs Last 24 Hrs  T(C): 36.6 (23 May 2019 16:00), Max: 37.2 (23 May 2019 12:00)  T(F): 97.8 (23 May 2019 16:00), Max: 99 (23 May 2019 12:00)  HR: 91 (23 May 2019 17:00) (80 - 103)  BP: 150/80 (23 May 2019 17:00) (112/71 - 173/99)  BP(mean): 98 (23 May 2019 17:00) (81 - 122)  RR: 12 (23 May 2019 17:00) (11 - 27)  SpO2: 100% (23 May 2019 17:00) (90% - 100%)        ## P/E:  Gen: awake, alert, lying in bed, no acute distress  Lungs: CTA b/l  Heart: RRR  Abd: Soft + BS, 2 JPs on left and 2 JPs on right  Ext: bilateral feet edema (pt states is chronic, "comes and goes")  Neuro: awake, alert, but confused at times however more appropriate today with responses      CENTRAL LINE: [ ] YES [x] NO  LOCATION:   DATE INSERTED:  REMOVE: [ ] YES [ ] NO      GARCIA: [ ] YES [x] NO    DATE INSERTED:  REMOVE:  [ ] YES [ ] NO      A-LINE:  [ ] YES [x] NO  LOCATION:   DATE INSERTED:  REMOVE:  [ ] YES [ ] NO  EXPLAIN:    CODE STATUS: [x ] full code  [ ] DNR  [ ] DNI  [ ] MOLST  Goals of care discussion: [ ] yes

## 2019-05-23 NOTE — PROGRESS NOTE ADULT - PROBLEM SELECTOR PLAN 1
New CT shows more collection  s/p IR drainage of RUQ parahepatic collection (250 cc purulent fluid was drained )  follow up on new c/s   check gent level and redose accordingly  cont current antibiotics(nafcillin and flagyl )

## 2019-05-23 NOTE — PROGRESS NOTE ADULT - SUBJECTIVE AND OBJECTIVE BOX
Patient s/p Ultrasound-guided right upper quadrant fluid collection drain placement.   Leukocytosis improving, afebrile.  Pt denies any new complaints.    Output- 25mls,   IR was called to evaluate catheter b/c patient pulled on it.  The catheter was able to  flush and aspirate well, new sterile dressing applied.  Will monitor for drainage output.        PHYSICAL EXAM:    Vital Signs Last 24 Hrs  T(C): 36.6 (23 May 2019 16:00), Max: 37.2 (23 May 2019 12:00)  T(F): 97.8 (23 May 2019 16:00), Max: 99 (23 May 2019 12:00)  HR: 92 (23 May 2019 15:00) (80 - 103)  BP: 150/91 (23 May 2019 15:00) (112/71 - 173/99)  BP(mean): 106 (23 May 2019 15:00) (81 - 122)  RR: 27 (23 May 2019 15:00) (11 - 27)  SpO2: 99% (23 May 2019 15:00) (90% - 100%)    General:   NAD  Lungs:  CTAB  Cardiovascular:  good S1, S2,   Abdomen:  Soft, non-tender, non-distended, catheters in situ  Extremities:  no calf swelling/tenderness b/l        LABS:                        9.0    20.87 )-----------( 453      ( 23 May 2019 10:57 )             26.5     05-23    148<H>  |  117<H>  |  32<H>  ----------------------------<  99  3.4<L>   |  19<L>  |  1.92<H>    Ca    7.6<L>      23 May 2019 10:57  Phos  3.7     05-23  Mg     1.6     05-23    TPro  4.8<L>  /  Alb  0.8<L>  /  TBili  1.1  /  DBili  x   /  AST  10<L>  /  ALT  <6<L>  /  AlkPhos  165<H>  05-23    PT/INR - ( 22 May 2019 04:03 )   PT: 15.0 sec;   INR: 1.33 ratio         PTT - ( 22 May 2019 04:03 )  PTT:33.9 sec      RADIOLOGY & ADDITIONAL STUDIES:

## 2019-05-23 NOTE — PROGRESS NOTE ADULT - ASSESSMENT
Patient s/p Ultrasound-guided right upper quadrant fluid collection drain placement.    58M PMH ETOH abuse, pancreatitis, hep C a/w gastric perforation POD#7 s/p ex-lap, yusef patch repair, peritoneal lavage.

## 2019-05-23 NOTE — PROGRESS NOTE ADULT - ASSESSMENT
58M active smoker PMH ETOH abuse, chronic pancreatitis, Hep C and gout presents with abdominal pain, N/V x3 days. Pt was admitted to surgical service for sepsis, YUNIEL,  peritonitis due to perforated viscous. Taken to OR s/p ex lap, abdominal washout for purulent peritonitis, David patch for gastric perforation. Transferred to ICU post op intubated with sepsis, septic shock on pressors, with post op anemia Hb 5.5, hypokalemia, hypoglycemia. Extubated 5/17. Here with staph bacteremia, staph peritonitis. Post op course with worsening leukocytosis found to have lateral perihepatic collection and retrogastric collection. s/p IR drainage of perihepatic collection 5/22 with purulent drainage.    1. NEURO  - cont MVI/folic acid/thiamine  - on ativan taper to prevent alcohol withdrawal  - pt has been calm overall, monitor CIWA, currently CIWA 2    2. PULM  - extubated and doing well from pulmonary stand point  - no active issues    3. ID  - cont nafcillin and flagyl  - repeat surveillance blood cultures are negative to date  - follow up perihepatic collection drainage cx  - leukocytosis improving today, cont to trend WBC  - ID follow up    4. CV  - out of shock state  - BP stable  - off pressors  - echo reviewed: no vegetations noted    5. HEME  - h/h stable  - s/p 3 units pRBC total    6. RENAL  - ARF with ATN resolving  - Cr downtrending  - cont to monitor Cr  - supplement hypokalemia  - monitor Na, slightly hypernatremic but will start PO clears    7. ENDO  - hypoglycemia resolved: likely due to NPO status  - on D5 LR IVF while npo without further hypoglycemia    8. GI  - cont to monitor CINDY drain output  - post operative pain control: morphine prn  - upper GI series without leak: NGT d/paulina and can start clear liquid diet per surgery    9. GEN  - physical therapy  - OOB to chair  - stable for transfer to med/surg floor  - d/w surgical attending

## 2019-05-24 LAB
ANION GAP SERPL CALC-SCNC: 9 MMOL/L — SIGNIFICANT CHANGE UP (ref 5–17)
BUN SERPL-MCNC: 31 MG/DL — HIGH (ref 7–23)
CALCIUM SERPL-MCNC: 8 MG/DL — LOW (ref 8.5–10.1)
CHLORIDE SERPL-SCNC: 118 MMOL/L — HIGH (ref 96–108)
CO2 SERPL-SCNC: 19 MMOL/L — LOW (ref 22–31)
CREAT SERPL-MCNC: 1.7 MG/DL — HIGH (ref 0.5–1.3)
GENTAMICIN SERPL-MCNC: 2.6 UG/ML — SIGNIFICANT CHANGE UP
GLUCOSE SERPL-MCNC: 85 MG/DL — SIGNIFICANT CHANGE UP (ref 70–99)
HCT VFR BLD CALC: 26.3 % — LOW (ref 39–50)
HGB BLD-MCNC: 8.5 G/DL — LOW (ref 13–17)
MAGNESIUM SERPL-MCNC: 1.8 MG/DL — SIGNIFICANT CHANGE UP (ref 1.6–2.6)
MCHC RBC-ENTMCNC: 28.7 PG — SIGNIFICANT CHANGE UP (ref 27–34)
MCHC RBC-ENTMCNC: 32.3 GM/DL — SIGNIFICANT CHANGE UP (ref 32–36)
MCV RBC AUTO: 88.9 FL — SIGNIFICANT CHANGE UP (ref 80–100)
NRBC # BLD: 0 /100 WBCS — SIGNIFICANT CHANGE UP (ref 0–0)
PHOSPHATE SERPL-MCNC: 3.7 MG/DL — SIGNIFICANT CHANGE UP (ref 2.5–4.5)
PLATELET # BLD AUTO: 480 K/UL — HIGH (ref 150–400)
POTASSIUM SERPL-MCNC: 3.5 MMOL/L — SIGNIFICANT CHANGE UP (ref 3.5–5.3)
POTASSIUM SERPL-SCNC: 3.5 MMOL/L — SIGNIFICANT CHANGE UP (ref 3.5–5.3)
RBC # BLD: 2.96 M/UL — LOW (ref 4.2–5.8)
RBC # FLD: 20.4 % — HIGH (ref 10.3–14.5)
SODIUM SERPL-SCNC: 146 MMOL/L — HIGH (ref 135–145)
WBC # BLD: 23.46 K/UL — HIGH (ref 3.8–10.5)
WBC # FLD AUTO: 23.46 K/UL — HIGH (ref 3.8–10.5)

## 2019-05-24 PROCEDURE — 99233 SBSQ HOSP IP/OBS HIGH 50: CPT

## 2019-05-24 RX ORDER — MORPHINE SULFATE 50 MG/1
2 CAPSULE, EXTENDED RELEASE ORAL ONCE
Refills: 0 | Status: DISCONTINUED | OUTPATIENT
Start: 2019-05-24 | End: 2019-05-24

## 2019-05-24 RX ORDER — GENTAMICIN SULFATE 40 MG/ML
180 VIAL (ML) INJECTION ONCE
Refills: 0 | Status: COMPLETED | OUTPATIENT
Start: 2019-05-25 | End: 2019-05-25

## 2019-05-24 RX ADMIN — HEPARIN SODIUM 5000 UNIT(S): 5000 INJECTION INTRAVENOUS; SUBCUTANEOUS at 18:02

## 2019-05-24 RX ADMIN — MORPHINE SULFATE 4 MILLIGRAM(S): 50 CAPSULE, EXTENDED RELEASE ORAL at 22:13

## 2019-05-24 RX ADMIN — MORPHINE SULFATE 2 MILLIGRAM(S): 50 CAPSULE, EXTENDED RELEASE ORAL at 14:50

## 2019-05-24 RX ADMIN — PANTOPRAZOLE SODIUM 40 MILLIGRAM(S): 20 TABLET, DELAYED RELEASE ORAL at 18:03

## 2019-05-24 RX ADMIN — Medication 100 MILLIGRAM(S): at 22:23

## 2019-05-24 RX ADMIN — MORPHINE SULFATE 2 MILLIGRAM(S): 50 CAPSULE, EXTENDED RELEASE ORAL at 14:37

## 2019-05-24 RX ADMIN — CHLORHEXIDINE GLUCONATE 1 APPLICATION(S): 213 SOLUTION TOPICAL at 06:39

## 2019-05-24 RX ADMIN — NAFCILLIN 200 GRAM(S): 10 INJECTION, POWDER, FOR SOLUTION INTRAVENOUS at 22:14

## 2019-05-24 RX ADMIN — Medication 1 MILLIGRAM(S): at 18:02

## 2019-05-24 RX ADMIN — HEPARIN SODIUM 5000 UNIT(S): 5000 INJECTION INTRAVENOUS; SUBCUTANEOUS at 05:02

## 2019-05-24 RX ADMIN — MORPHINE SULFATE 2 MILLIGRAM(S): 50 CAPSULE, EXTENDED RELEASE ORAL at 09:00

## 2019-05-24 RX ADMIN — MORPHINE SULFATE 2 MILLIGRAM(S): 50 CAPSULE, EXTENDED RELEASE ORAL at 10:50

## 2019-05-24 RX ADMIN — NAFCILLIN 200 GRAM(S): 10 INJECTION, POWDER, FOR SOLUTION INTRAVENOUS at 02:21

## 2019-05-24 RX ADMIN — NAFCILLIN 200 GRAM(S): 10 INJECTION, POWDER, FOR SOLUTION INTRAVENOUS at 18:02

## 2019-05-24 RX ADMIN — NAFCILLIN 200 GRAM(S): 10 INJECTION, POWDER, FOR SOLUTION INTRAVENOUS at 05:02

## 2019-05-24 RX ADMIN — MORPHINE SULFATE 2 MILLIGRAM(S): 50 CAPSULE, EXTENDED RELEASE ORAL at 08:34

## 2019-05-24 RX ADMIN — MORPHINE SULFATE 4 MILLIGRAM(S): 50 CAPSULE, EXTENDED RELEASE ORAL at 22:30

## 2019-05-24 RX ADMIN — Medication 100 MILLIGRAM(S): at 13:16

## 2019-05-24 RX ADMIN — NAFCILLIN 200 GRAM(S): 10 INJECTION, POWDER, FOR SOLUTION INTRAVENOUS at 09:47

## 2019-05-24 RX ADMIN — NAFCILLIN 200 GRAM(S): 10 INJECTION, POWDER, FOR SOLUTION INTRAVENOUS at 14:59

## 2019-05-24 RX ADMIN — PANTOPRAZOLE SODIUM 40 MILLIGRAM(S): 20 TABLET, DELAYED RELEASE ORAL at 05:02

## 2019-05-24 RX ADMIN — MORPHINE SULFATE 2 MILLIGRAM(S): 50 CAPSULE, EXTENDED RELEASE ORAL at 10:32

## 2019-05-24 RX ADMIN — Medication 1 MILLIGRAM(S): at 05:02

## 2019-05-24 RX ADMIN — Medication 100 MILLIGRAM(S): at 05:02

## 2019-05-24 RX ADMIN — MORPHINE SULFATE 2 MILLIGRAM(S): 50 CAPSULE, EXTENDED RELEASE ORAL at 02:57

## 2019-05-24 NOTE — PROGRESS NOTE ADULT - SUBJECTIVE AND OBJECTIVE BOX
SURGERY PROGRESS HPI:  Pt seen and examined at bedside. Denies pain or complaints. Pt tolerating clear liquid diet. Pt denies nausea and vomiting. +BMs in flexiseal. Voiding well. Pt denies chest pain, SOB, dizziness, fever, chills. Reports PT came to see him but he did not ambulate.      Vital Signs Last 24 Hrs  T(C): 36.8 (24 May 2019 04:53), Max: 37.2 (23 May 2019 12:00)  T(F): 98.2 (24 May 2019 04:53), Max: 99 (23 May 2019 12:00)  HR: 88 (24 May 2019 03:42) (83 - 103)  BP: 125/71 (24 May 2019 03:42) (112/71 - 169/91)  BP(mean): 85 (24 May 2019 03:42) (81 - 122)  RR: 14 (24 May 2019 03:42) (11 - 27)  SpO2: 100% (24 May 2019 03:42) (97% - 100%)      PHYSICAL EXAM:    GENERAL: NAD, awake, alert  CHEST/LUNG: Clear to ausculation, bilaterally   HEART: RRR S1S2  ABDOMEN: Midline dressing c/d/i. Wound clean with staples intact. Cleansed with NS. New dressing placed. JPs intact with minimal serous drainage, Perisplenic drain w/ 22cc/24hrs, Anterior stomach drain w/ 7cc/24hrs, Duodenal gutter drain w/ 10cc/24hrs. IR percutaneous drain flushed easily with 10ccNS with good return 55cc/2hrs.  +BS, soft, appropriate incisional tenderness. Flexiseal in place with dark green output.  EXTREMITIES:  calf soft, non tender b/l    I&O's Detail    22 May 2019 07:01  -  23 May 2019 07:00  --------------------------------------------------------  IN:    dextrose 5% + lactated ringers.: 1800 mL    multivitamin/thiamine/folic acid in sodium chloride 0.9%: 126 mL    multivitamin/thiamine/folic acid in sodium chloride 0.9%: 588 mL    Solution: 200 mL    Solution: 300 mL    Solution: 600 mL  Total IN: 3614 mL    OUT:    Bulb: 370 mL    Drain: 45 mL    Drain: 7 mL    Drain: 10 mL    Nasoenteral Tube: 150 mL    Voided: 205 mL  Total OUT: 787 mL    Total NET: 2827 mL      23 May 2019 07:01  -  24 May 2019 05:08  --------------------------------------------------------  IN:    dextrose 5% + lactated ringers.: 750 mL    multivitamin/thiamine/folic acid in sodium chloride 0.9%: 357 mL    Oral Fluid: 220 mL    Solution: 200 mL    Solution: 100 mL    Solution: 100 mL    Solution: 200 mL    Solution: 500 mL  Total IN: 2427 mL    OUT:    Bulb: 55 mL    Drain: 10 mL    Drain: 22 mL    Drain: 7 mL    Nasoenteral Tube: 50 mL    Voided: 425 mL  Total OUT: 569 mL    Total NET: 1858 mL          LABS:                        8.5    23.46 )-----------( 480      ( 24 May 2019 04:09 )             26.3     05-24    146<H>  |  118<H>  |  31<H>  ----------------------------<  85  3.5   |  19<L>  |  1.70<H>    Ca    8.0<L>      24 May 2019 04:09  Phos  3.7     05-24  Mg     1.8     05-24    TPro  4.8<L>  /  Alb  0.8<L>  /  TBili  1.1  /  DBili  x   /  AST  10<L>  /  ALT  <6<L>  /  AlkPhos  165<H>  05-23      Culture Results:   No Protozoa seen by trichrome stain  (routine O+P not evaluated for Microsporidia,  Cryptosporidia, Cyclospora, or Isospora.) (05-23 @ 00:20)  Culture Results:   No growth (05-23 @ 00:20)  Culture Results:   Testing in progress (05-23 @ 00:20)  Culture Results:   No growth to date. (05-22 @ 00:56)  Culture Results:   No growth to date. (05-22 @ 00:56)      Xray Upper GI Series (05.23.19 @ 11:33)   IMPRESSION:   No leak or extravasation identified. Mild prominence of the proximal   small bowel loops compatible with postsurgical ileus.      Impression: 58M PMH ETOH abuse, pancreatitis, hep C a/w gastric perforation POD#8 s/p ex-lap, yusef patch repair, peritoneal lavage. Found to have retrogastric collection and/or lateral perihepatic/subcapsular collection drainage s/sp perc drainage 5/22  UGIS showed no leak or extravasation    Plan:  - cont drains to self suction, monitor output  - continue antibiotics per ID, trend WBC  - clear liquid diet for now. possibly advance later today  - continue medical management per CCU team  - DVT ppx, GI ppx, incentive spirometer, increase activity with PT  - will d/w surgical attending

## 2019-05-24 NOTE — PROGRESS NOTE ADULT - SUBJECTIVE AND OBJECTIVE BOX
Patient is a 58y old  Male who presents with a chief complaint of Abdominal pain, vomiting (24 May 2019 05:07)      INTERVAL HPI / OVERNIGHT EVENTS:  doing ok , tolerating oral fluids    MEDICATIONS  (STANDING):  chlorhexidine 4% Liquid 1 Application(s) Topical <User Schedule>  heparin  Injectable 5000 Unit(s) SubCutaneous every 12 hours  LORazepam   Injectable   IV Push   LORazepam   Injectable 1 milliGRAM(s) IV Push every 12 hours  metroNIDAZOLE  IVPB 500 milliGRAM(s) IV Intermittent every 8 hours  nafcillin  IVPB 2 Gram(s) IV Intermittent every 4 hours  nafcillin  IVPB      pantoprazole  Injectable 40 milliGRAM(s) IV Push every 12 hours    MEDICATIONS  (PRN):  LORazepam   Injectable 2 milliGRAM(s) IV Push every 2 hours PRN agitation/ restlessness  morphine  - Injectable 4 milliGRAM(s) IV Push every 6 hours PRN Severe Pain (7 - 10)  morphine  - Injectable 2 milliGRAM(s) IV Push every 6 hours PRN Moderate Pain (4 - 6)      Vital Signs Last 24 Hrs  T(C): 36.7 (24 May 2019 13:12), Max: 36.8 (24 May 2019 04:53)  T(F): 98 (24 May 2019 13:12), Max: 98.2 (24 May 2019 04:53)  HR: 92 (24 May 2019 13:12) (88 - 114)  BP: 152/82 (24 May 2019 13:12) (125/71 - 169/91)  BP(mean): 100 (24 May 2019 13:12) (85 - 122)  RR: 15 (24 May 2019 13:12) (12 - 27)  SpO2: 98% (24 May 2019 13:12) (98% - 100%)    Review of systems:  General : no fever/chills, fatigue  CVS : no chest pain, palpitations  Lungs : no shortness of breath, cough  GI : + abdominal pain, No vomiting, diarrhea   : no dysuria, hematuria        PHYSICAL EXAM:  General :NAD  Constitutional: well-groomed, well-developed  Respiratory: CTAB/L  Cardiovascular: S1 and S2, RRR, no M/G/R  Gastrointestinal: s/p exlap drains X 4   Extremities: No peripheral edema  Vascular: 2+ peripheral pulses  Skin: No rashes      LABS:                        8.5    23.46 )-----------( 480      ( 24 May 2019 04:09 )             26.3     05-24    146<H>  |  118<H>  |  31<H>  ----------------------------<  85  3.5   |  19<L>  |  1.70<H>    Ca    8.0<L>      24 May 2019 04:09  Phos  3.7     05-24  Mg     1.8     05-24    TPro  4.8<L>  /  Alb  0.8<L>  /  TBili  1.1  /  DBili  x   /  AST  10<L>  /  ALT  <6<L>  /  AlkPhos  165<H>  05-23          MICROBIOLOGY:  RECENT CULTURES:  05-23 .Other RIGHT UPPER QUADRANT ABSCESS XXXX XXXX   No Protozoa seen by trichrome stain  (routine O+P not evaluated for Microsporidia,  Cryptosporidia, Cyclospora, or Isospora.)    05-22 .Blood XXXX XXXX   No growth to date.          RADIOLOGY & ADDITIONAL STUDIES:

## 2019-05-24 NOTE — PROGRESS NOTE ADULT - PROBLEM SELECTOR PLAN 1
New CT shows more collection  s/p IR drainage of RUQ parahepatic collection (250 cc purulent fluid was drained )  follow up on new c/s so far neg   O/P fungal all neg  gent level 3.2   can be redosed tomorrow   cont current antibiotics(nafcillin and flagyl )

## 2019-05-25 LAB
ANION GAP SERPL CALC-SCNC: 11 MMOL/L — SIGNIFICANT CHANGE UP (ref 5–17)
BASOPHILS # BLD AUTO: 0 K/UL — SIGNIFICANT CHANGE UP (ref 0–0.2)
BASOPHILS NFR BLD AUTO: 0 % — SIGNIFICANT CHANGE UP (ref 0–2)
BUN SERPL-MCNC: 25 MG/DL — HIGH (ref 7–23)
CALCIUM SERPL-MCNC: 7.3 MG/DL — LOW (ref 8.5–10.1)
CHLORIDE SERPL-SCNC: 118 MMOL/L — HIGH (ref 96–108)
CO2 SERPL-SCNC: 17 MMOL/L — LOW (ref 22–31)
CREAT SERPL-MCNC: 1.68 MG/DL — HIGH (ref 0.5–1.3)
EOSINOPHIL # BLD AUTO: 0.29 K/UL — SIGNIFICANT CHANGE UP (ref 0–0.5)
EOSINOPHIL NFR BLD AUTO: 1 % — SIGNIFICANT CHANGE UP (ref 0–6)
GLUCOSE SERPL-MCNC: 83 MG/DL — SIGNIFICANT CHANGE UP (ref 70–99)
HCT VFR BLD CALC: 25.8 % — LOW (ref 39–50)
HGB BLD-MCNC: 8.3 G/DL — LOW (ref 13–17)
HYPOCHROMIA BLD QL: SLIGHT — SIGNIFICANT CHANGE UP
LYMPHOCYTES # BLD AUTO: 1.44 K/UL — SIGNIFICANT CHANGE UP (ref 1–3.3)
LYMPHOCYTES # BLD AUTO: 5 % — LOW (ref 13–44)
MACROCYTES BLD QL: SLIGHT — SIGNIFICANT CHANGE UP
MAGNESIUM SERPL-MCNC: 1.6 MG/DL — SIGNIFICANT CHANGE UP (ref 1.6–2.6)
MANUAL SMEAR VERIFICATION: SIGNIFICANT CHANGE UP
MCHC RBC-ENTMCNC: 28.7 PG — SIGNIFICANT CHANGE UP (ref 27–34)
MCHC RBC-ENTMCNC: 32.2 GM/DL — SIGNIFICANT CHANGE UP (ref 32–36)
MCV RBC AUTO: 89.3 FL — SIGNIFICANT CHANGE UP (ref 80–100)
MICROCYTES BLD QL: SLIGHT — SIGNIFICANT CHANGE UP
MONOCYTES # BLD AUTO: 0.86 K/UL — SIGNIFICANT CHANGE UP (ref 0–0.9)
MONOCYTES NFR BLD AUTO: 3 % — SIGNIFICANT CHANGE UP (ref 2–14)
NEUTROPHILS # BLD AUTO: 26.16 K/UL — HIGH (ref 1.8–7.4)
NEUTROPHILS NFR BLD AUTO: 90 % — HIGH (ref 43–77)
NEUTS BAND # BLD: 1 % — SIGNIFICANT CHANGE UP (ref 0–8)
NRBC # BLD: 0 /100 — SIGNIFICANT CHANGE UP (ref 0–0)
NRBC # BLD: SIGNIFICANT CHANGE UP /100 WBCS (ref 0–0)
PHOSPHATE SERPL-MCNC: 4.1 MG/DL — SIGNIFICANT CHANGE UP (ref 2.5–4.5)
PLAT MORPH BLD: NORMAL — SIGNIFICANT CHANGE UP
PLATELET # BLD AUTO: 535 K/UL — HIGH (ref 150–400)
POTASSIUM SERPL-MCNC: 3.5 MMOL/L — SIGNIFICANT CHANGE UP (ref 3.5–5.3)
POTASSIUM SERPL-SCNC: 3.5 MMOL/L — SIGNIFICANT CHANGE UP (ref 3.5–5.3)
RBC # BLD: 2.89 M/UL — LOW (ref 4.2–5.8)
RBC # FLD: 20 % — HIGH (ref 10.3–14.5)
RBC BLD AUTO: SIGNIFICANT CHANGE UP
SODIUM SERPL-SCNC: 146 MMOL/L — HIGH (ref 135–145)
WBC # BLD: 28.75 K/UL — HIGH (ref 3.8–10.5)
WBC # FLD AUTO: 28.75 K/UL — HIGH (ref 3.8–10.5)

## 2019-05-25 RX ORDER — ACETAMINOPHEN 500 MG
1000 TABLET ORAL ONCE
Refills: 0 | Status: COMPLETED | OUTPATIENT
Start: 2019-05-25 | End: 2019-05-25

## 2019-05-25 RX ADMIN — NAFCILLIN 200 GRAM(S): 10 INJECTION, POWDER, FOR SOLUTION INTRAVENOUS at 02:55

## 2019-05-25 RX ADMIN — NAFCILLIN 200 GRAM(S): 10 INJECTION, POWDER, FOR SOLUTION INTRAVENOUS at 09:52

## 2019-05-25 RX ADMIN — MORPHINE SULFATE 4 MILLIGRAM(S): 50 CAPSULE, EXTENDED RELEASE ORAL at 21:00

## 2019-05-25 RX ADMIN — NAFCILLIN 200 GRAM(S): 10 INJECTION, POWDER, FOR SOLUTION INTRAVENOUS at 22:21

## 2019-05-25 RX ADMIN — MORPHINE SULFATE 4 MILLIGRAM(S): 50 CAPSULE, EXTENDED RELEASE ORAL at 12:55

## 2019-05-25 RX ADMIN — NAFCILLIN 200 GRAM(S): 10 INJECTION, POWDER, FOR SOLUTION INTRAVENOUS at 17:52

## 2019-05-25 RX ADMIN — MORPHINE SULFATE 2 MILLIGRAM(S): 50 CAPSULE, EXTENDED RELEASE ORAL at 09:51

## 2019-05-25 RX ADMIN — NAFCILLIN 200 GRAM(S): 10 INJECTION, POWDER, FOR SOLUTION INTRAVENOUS at 14:06

## 2019-05-25 RX ADMIN — Medication 100 MILLIGRAM(S): at 22:21

## 2019-05-25 RX ADMIN — PANTOPRAZOLE SODIUM 40 MILLIGRAM(S): 20 TABLET, DELAYED RELEASE ORAL at 06:49

## 2019-05-25 RX ADMIN — CHLORHEXIDINE GLUCONATE 1 APPLICATION(S): 213 SOLUTION TOPICAL at 10:14

## 2019-05-25 RX ADMIN — PANTOPRAZOLE SODIUM 40 MILLIGRAM(S): 20 TABLET, DELAYED RELEASE ORAL at 17:31

## 2019-05-25 RX ADMIN — MORPHINE SULFATE 2 MILLIGRAM(S): 50 CAPSULE, EXTENDED RELEASE ORAL at 16:43

## 2019-05-25 RX ADMIN — Medication 100 MILLIGRAM(S): at 06:49

## 2019-05-25 RX ADMIN — MORPHINE SULFATE 4 MILLIGRAM(S): 50 CAPSULE, EXTENDED RELEASE ORAL at 06:48

## 2019-05-25 RX ADMIN — Medication 1 MILLIGRAM(S): at 06:49

## 2019-05-25 RX ADMIN — Medication 100 MILLIGRAM(S): at 12:40

## 2019-05-25 RX ADMIN — MORPHINE SULFATE 4 MILLIGRAM(S): 50 CAPSULE, EXTENDED RELEASE ORAL at 20:37

## 2019-05-25 RX ADMIN — HEPARIN SODIUM 5000 UNIT(S): 5000 INJECTION INTRAVENOUS; SUBCUTANEOUS at 06:50

## 2019-05-25 RX ADMIN — Medication 400 MILLIGRAM(S): at 03:16

## 2019-05-25 RX ADMIN — MORPHINE SULFATE 4 MILLIGRAM(S): 50 CAPSULE, EXTENDED RELEASE ORAL at 07:10

## 2019-05-25 RX ADMIN — Medication 200 MILLIGRAM(S): at 11:17

## 2019-05-25 RX ADMIN — MORPHINE SULFATE 2 MILLIGRAM(S): 50 CAPSULE, EXTENDED RELEASE ORAL at 17:00

## 2019-05-25 RX ADMIN — HEPARIN SODIUM 5000 UNIT(S): 5000 INJECTION INTRAVENOUS; SUBCUTANEOUS at 17:32

## 2019-05-25 RX ADMIN — MORPHINE SULFATE 2 MILLIGRAM(S): 50 CAPSULE, EXTENDED RELEASE ORAL at 10:06

## 2019-05-25 RX ADMIN — MORPHINE SULFATE 2 MILLIGRAM(S): 50 CAPSULE, EXTENDED RELEASE ORAL at 00:54

## 2019-05-25 RX ADMIN — MORPHINE SULFATE 2 MILLIGRAM(S): 50 CAPSULE, EXTENDED RELEASE ORAL at 01:15

## 2019-05-25 RX ADMIN — Medication 1000 MILLIGRAM(S): at 03:35

## 2019-05-25 RX ADMIN — NAFCILLIN 200 GRAM(S): 10 INJECTION, POWDER, FOR SOLUTION INTRAVENOUS at 06:49

## 2019-05-25 RX ADMIN — MORPHINE SULFATE 4 MILLIGRAM(S): 50 CAPSULE, EXTENDED RELEASE ORAL at 12:40

## 2019-05-25 RX ADMIN — Medication 1 MILLIGRAM(S): at 17:32

## 2019-05-25 NOTE — PROGRESS NOTE ADULT - SUBJECTIVE AND OBJECTIVE BOX
Pt seen and examined at bedside with Dr. Murray, no acute issues overnight, tolerated full liquids this morning, requesting regular diet. No acute issues overnight. Flexi-Seal removed yesterday, states diarrhea has gotten less.    Vital Signs Last 24 Hrs  T(C): 37.2 (25 May 2019 18:10), Max: 37.2 (25 May 2019 12:09)  T(F): 98.9 (25 May 2019 18:10), Max: 98.9 (25 May 2019 12:09)  HR: 96 (25 May 2019 18:10) (89 - 107)  BP: 174/89 (25 May 2019 18:10) (148/82 - 179/94)  BP(mean): --  RR: 17 (25 May 2019 18:10) (17 - 18)  SpO2: 95% (25 May 2019 18:10) (95% - 100%)  I&O's Summary    24 May 2019 07:01  -  25 May 2019 07:00  --------------------------------------------------------  IN: 1000 mL / OUT: 722 mL / NET: 278 mL    25 May 2019 07:01  -  25 May 2019 20:14  --------------------------------------------------------  IN: 120 mL / OUT: 175 mL / NET: -55 mL        GENERAL: NAD, awake, alert  CHEST/LUNG: Clear to ausculation, bilaterally   HEART: RRR S1S2  ABDOMEN: Midline dressing c/d/i. Wound clean with staples intact. Cleansed with NS. New dressing placed. Drains in place with minimal output. IR percutaneous drain flushed easily with 10ccNS with good return .  +BS, soft, appropriate incisional tenderness. Flexiseal in place with dark green output.  EXTREMITIES:  calf soft, non tender b/l    LABS:                        8.3    28.75 )-----------( 535      ( 25 May 2019 07:51 )             25.8     05-25    146<H>  |  118<H>  |  25<H>  ----------------------------<  83  3.5   |  17<L>  |  1.68<H>    Ca    7.3<L>      25 May 2019 07:51  Phos  4.1     05-25  Mg     1.6     05-25          Culture Results:   No Protozoa seen by trichrome stain  (routine O+P not evaluated for Microsporidia,  Cryptosporidia, Cyclospora, or Isospora.) (05-23 @ 00:20)  Culture Results:   No growth (05-23 @ 00:20)  Culture Results:   Testing in progress (05-23 @ 00:20)  Culture Results:   Culture is being performed. (05-23 @ 00:20)    Impression: 58M PMH ETOH abuse, pancreatitis, hep C a/w gastric perforation POD#9 s/p ex-lap, yusef patch repair, peritoneal lavage. Found to have retrogastric collection and/or lateral perihepatic/subcapsular collection drainage s/sp perc drainage 5/22  UGIS showed no leak or extravasation    Plan:  - cont drains to self suction, monitor output  - continue antibiotics per ID, trend WBC  -  possibly advance diet tomorrow  - continue medical management per CCU team  - DVT ppx, GI ppx, incentive spirometer, increase activity with PT  - d/w surgical attending

## 2019-05-26 LAB
ANION GAP SERPL CALC-SCNC: 9 MMOL/L — SIGNIFICANT CHANGE UP (ref 5–17)
BUN SERPL-MCNC: 22 MG/DL — SIGNIFICANT CHANGE UP (ref 7–23)
CALCIUM SERPL-MCNC: 7.6 MG/DL — LOW (ref 8.5–10.1)
CHLORIDE SERPL-SCNC: 118 MMOL/L — HIGH (ref 96–108)
CO2 SERPL-SCNC: 17 MMOL/L — LOW (ref 22–31)
CREAT SERPL-MCNC: 1.61 MG/DL — HIGH (ref 0.5–1.3)
GLUCOSE SERPL-MCNC: 94 MG/DL — SIGNIFICANT CHANGE UP (ref 70–99)
HCT VFR BLD CALC: 25.6 % — LOW (ref 39–50)
HGB BLD-MCNC: 8.3 G/DL — LOW (ref 13–17)
MAGNESIUM SERPL-MCNC: 1.7 MG/DL — SIGNIFICANT CHANGE UP (ref 1.6–2.6)
MCHC RBC-ENTMCNC: 29 PG — SIGNIFICANT CHANGE UP (ref 27–34)
MCHC RBC-ENTMCNC: 32.4 GM/DL — SIGNIFICANT CHANGE UP (ref 32–36)
MCV RBC AUTO: 89.5 FL — SIGNIFICANT CHANGE UP (ref 80–100)
METHOD TYPE: SIGNIFICANT CHANGE UP
MSSA DNA SPEC QL NAA+PROBE: SIGNIFICANT CHANGE UP
NRBC # BLD: 0 /100 WBCS — SIGNIFICANT CHANGE UP (ref 0–0)
PHOSPHATE SERPL-MCNC: 4 MG/DL — SIGNIFICANT CHANGE UP (ref 2.5–4.5)
PLATELET # BLD AUTO: 560 K/UL — HIGH (ref 150–400)
POTASSIUM SERPL-MCNC: 3.1 MMOL/L — LOW (ref 3.5–5.3)
POTASSIUM SERPL-SCNC: 3.1 MMOL/L — LOW (ref 3.5–5.3)
RBC # BLD: 2.86 M/UL — LOW (ref 4.2–5.8)
RBC # FLD: 19.8 % — HIGH (ref 10.3–14.5)
SODIUM SERPL-SCNC: 144 MMOL/L — SIGNIFICANT CHANGE UP (ref 135–145)
WBC # BLD: 25.58 K/UL — HIGH (ref 3.8–10.5)
WBC # FLD AUTO: 25.58 K/UL — HIGH (ref 3.8–10.5)

## 2019-05-26 RX ORDER — OXYCODONE AND ACETAMINOPHEN 5; 325 MG/1; MG/1
2 TABLET ORAL EVERY 4 HOURS
Refills: 0 | Status: DISCONTINUED | OUTPATIENT
Start: 2019-05-26 | End: 2019-06-02

## 2019-05-26 RX ORDER — POTASSIUM CHLORIDE 20 MEQ
40 PACKET (EA) ORAL EVERY 4 HOURS
Refills: 0 | Status: COMPLETED | OUTPATIENT
Start: 2019-05-26 | End: 2019-05-26

## 2019-05-26 RX ORDER — SUCRALFATE 1 G
1 TABLET ORAL EVERY 6 HOURS
Refills: 0 | Status: DISCONTINUED | OUTPATIENT
Start: 2019-05-26 | End: 2019-06-04

## 2019-05-26 RX ADMIN — Medication 40 MILLIEQUIVALENT(S): at 17:42

## 2019-05-26 RX ADMIN — PANTOPRAZOLE SODIUM 40 MILLIGRAM(S): 20 TABLET, DELAYED RELEASE ORAL at 06:14

## 2019-05-26 RX ADMIN — MORPHINE SULFATE 4 MILLIGRAM(S): 50 CAPSULE, EXTENDED RELEASE ORAL at 02:42

## 2019-05-26 RX ADMIN — MORPHINE SULFATE 2 MILLIGRAM(S): 50 CAPSULE, EXTENDED RELEASE ORAL at 07:00

## 2019-05-26 RX ADMIN — MORPHINE SULFATE 4 MILLIGRAM(S): 50 CAPSULE, EXTENDED RELEASE ORAL at 23:23

## 2019-05-26 RX ADMIN — HEPARIN SODIUM 5000 UNIT(S): 5000 INJECTION INTRAVENOUS; SUBCUTANEOUS at 17:07

## 2019-05-26 RX ADMIN — Medication 40 MILLIEQUIVALENT(S): at 21:50

## 2019-05-26 RX ADMIN — NAFCILLIN 200 GRAM(S): 10 INJECTION, POWDER, FOR SOLUTION INTRAVENOUS at 21:49

## 2019-05-26 RX ADMIN — MORPHINE SULFATE 4 MILLIGRAM(S): 50 CAPSULE, EXTENDED RELEASE ORAL at 23:08

## 2019-05-26 RX ADMIN — NAFCILLIN 200 GRAM(S): 10 INJECTION, POWDER, FOR SOLUTION INTRAVENOUS at 13:57

## 2019-05-26 RX ADMIN — Medication 100 MILLIGRAM(S): at 06:15

## 2019-05-26 RX ADMIN — MORPHINE SULFATE 2 MILLIGRAM(S): 50 CAPSULE, EXTENDED RELEASE ORAL at 00:03

## 2019-05-26 RX ADMIN — MORPHINE SULFATE 2 MILLIGRAM(S): 50 CAPSULE, EXTENDED RELEASE ORAL at 06:13

## 2019-05-26 RX ADMIN — MORPHINE SULFATE 4 MILLIGRAM(S): 50 CAPSULE, EXTENDED RELEASE ORAL at 17:22

## 2019-05-26 RX ADMIN — NAFCILLIN 200 GRAM(S): 10 INJECTION, POWDER, FOR SOLUTION INTRAVENOUS at 09:32

## 2019-05-26 RX ADMIN — Medication 100 MILLIGRAM(S): at 13:57

## 2019-05-26 RX ADMIN — MORPHINE SULFATE 4 MILLIGRAM(S): 50 CAPSULE, EXTENDED RELEASE ORAL at 17:08

## 2019-05-26 RX ADMIN — MORPHINE SULFATE 2 MILLIGRAM(S): 50 CAPSULE, EXTENDED RELEASE ORAL at 12:14

## 2019-05-26 RX ADMIN — OXYCODONE AND ACETAMINOPHEN 2 TABLET(S): 5; 325 TABLET ORAL at 21:00

## 2019-05-26 RX ADMIN — MORPHINE SULFATE 2 MILLIGRAM(S): 50 CAPSULE, EXTENDED RELEASE ORAL at 00:20

## 2019-05-26 RX ADMIN — MORPHINE SULFATE 2 MILLIGRAM(S): 50 CAPSULE, EXTENDED RELEASE ORAL at 12:30

## 2019-05-26 RX ADMIN — HEPARIN SODIUM 5000 UNIT(S): 5000 INJECTION INTRAVENOUS; SUBCUTANEOUS at 06:14

## 2019-05-26 RX ADMIN — Medication 1 GRAM(S): at 12:14

## 2019-05-26 RX ADMIN — NAFCILLIN 200 GRAM(S): 10 INJECTION, POWDER, FOR SOLUTION INTRAVENOUS at 02:49

## 2019-05-26 RX ADMIN — MORPHINE SULFATE 4 MILLIGRAM(S): 50 CAPSULE, EXTENDED RELEASE ORAL at 03:00

## 2019-05-26 RX ADMIN — MORPHINE SULFATE 4 MILLIGRAM(S): 50 CAPSULE, EXTENDED RELEASE ORAL at 08:35

## 2019-05-26 RX ADMIN — Medication 100 MILLIGRAM(S): at 21:52

## 2019-05-26 RX ADMIN — NAFCILLIN 200 GRAM(S): 10 INJECTION, POWDER, FOR SOLUTION INTRAVENOUS at 17:10

## 2019-05-26 RX ADMIN — MORPHINE SULFATE 4 MILLIGRAM(S): 50 CAPSULE, EXTENDED RELEASE ORAL at 08:17

## 2019-05-26 RX ADMIN — OXYCODONE AND ACETAMINOPHEN 2 TABLET(S): 5; 325 TABLET ORAL at 20:00

## 2019-05-26 RX ADMIN — NAFCILLIN 200 GRAM(S): 10 INJECTION, POWDER, FOR SOLUTION INTRAVENOUS at 06:15

## 2019-05-26 RX ADMIN — PANTOPRAZOLE SODIUM 40 MILLIGRAM(S): 20 TABLET, DELAYED RELEASE ORAL at 17:08

## 2019-05-26 RX ADMIN — Medication 1 GRAM(S): at 17:09

## 2019-05-26 RX ADMIN — Medication 1 MILLIGRAM(S): at 06:14

## 2019-05-26 NOTE — PROGRESS NOTE ADULT - SUBJECTIVE AND OBJECTIVE BOX
SURGERY PROGRESS HPI:  Pt seen and examined at bedside. Pain is well controlled on pain medication. Pt denies complaints. No acute events overnight. Pt tolerating full liquid diet. Pt denies nausea and vomiting.  +BM/flatus. Voiding well. Pt denies chest pain, SOB, dizziness, fever, chills. Ambulating.      Vital Signs Last 24 Hrs  T(C): 37.4 (25 May 2019 23:50), Max: 37.4 (25 May 2019 23:50)  T(F): 99.3 (25 May 2019 23:50), Max: 99.3 (25 May 2019 23:50)  HR: 92 (25 May 2019 23:50) (90 - 107)  BP: 166/89 (25 May 2019 23:50) (148/82 - 179/94)  BP(mean): --  RR: 18 (25 May 2019 23:50) (17 - 18)  SpO2: 94% (25 May 2019 23:50) (94% - 100%)      PHYSICAL EXAM:  GENERAL: NAD, awake, alert  CHEST/LUNG: Clear to ausculation, bilaterally   HEART: RRR S1S2  ABDOMEN: Midline dressing c/d/i. Wound clean with staples intact. Cleansed with NS. New dressing placed. JPs intact with minimal drainage, Perisplenic drain serous w/ 10cc/24hrs, Anterior stomach drain s/s w/ 5cc/24hrs, Duodenal gutter drain s/s w/ 7.5cc/24hrs. IR drain with serous output 2.5cc/24hrs. +BS, soft, appropriate incisional tenderness.   EXTREMITIES:  calf soft, non tender b/l    I&O's Detail    24 May 2019 07:01  -  25 May 2019 07:00  --------------------------------------------------------  IN:    Oral Fluid: 400 mL    Solution: 200 mL    Solution: 400 mL  Total IN: 1000 mL    OUT:    Bulb: 10 mL    Drain: 5 mL    Drain: 2 mL    Drain: 5 mL    Voided: 700 mL  Total OUT: 722 mL    Total NET: 278 mL      25 May 2019 07:01  -  26 May 2019 05:37  --------------------------------------------------------  IN:    Oral Fluid: 120 mL    Solution: 100 mL    Solution: 200 mL  Total IN: 420 mL    OUT:    Bulb: 2.5 mL    Drain: 10 mL    Drain: 5 mL    Drain: 7.5 mL    Voided: 150 mL  Total OUT: 175 mL    Total NET: 245 mL          LABS:                        8.3    28.75 )-----------( 535      ( 25 May 2019 07:51 )             25.8     05-25    146<H>  |  118<H>  |  25<H>  ----------------------------<  83  3.5   |  17<L>  |  1.68<H>    Ca    7.3<L>      25 May 2019 07:51  Phos  4.1     05-25  Mg     1.6     05-25          Impression: 58M PMH ETOH abuse, pancreatitis, hep C a/w gastric perforation POD#10 s/p ex-lap, yusef patch repair, peritoneal lavage. Found to have retrogastric collection and/or lateral perihepatic/subcapsular collection drainage s/sp perc drainage 5/22  UGIS showed no leak or extravasation    Plan:  - cont drains to self suction, monitor output  - continue antibiotics per ID, trend WBC  - advance diet  - will get a medical consult  - DVT ppx, GI ppx, incentive spirometer, increase activity with PT  - will d/w surgical attending

## 2019-05-27 LAB
-  AMPICILLIN/SULBACTAM: SIGNIFICANT CHANGE UP
-  CEFAZOLIN: SIGNIFICANT CHANGE UP
-  CLINDAMYCIN: SIGNIFICANT CHANGE UP
-  ERYTHROMYCIN: SIGNIFICANT CHANGE UP
-  GENTAMICIN: SIGNIFICANT CHANGE UP
-  OXACILLIN: SIGNIFICANT CHANGE UP
-  PENICILLIN: SIGNIFICANT CHANGE UP
-  RIFAMPIN: SIGNIFICANT CHANGE UP
-  TETRACYCLINE: SIGNIFICANT CHANGE UP
-  TRIMETHOPRIM/SULFAMETHOXAZOLE: SIGNIFICANT CHANGE UP
-  VANCOMYCIN: SIGNIFICANT CHANGE UP
ANION GAP SERPL CALC-SCNC: 8 MMOL/L — SIGNIFICANT CHANGE UP (ref 5–17)
BASOPHILS # BLD AUTO: 0.16 K/UL — SIGNIFICANT CHANGE UP (ref 0–0.2)
BASOPHILS NFR BLD AUTO: 0.6 % — SIGNIFICANT CHANGE UP (ref 0–2)
BUN SERPL-MCNC: 22 MG/DL — SIGNIFICANT CHANGE UP (ref 7–23)
CALCIUM SERPL-MCNC: 8 MG/DL — LOW (ref 8.5–10.1)
CHLORIDE SERPL-SCNC: 119 MMOL/L — HIGH (ref 96–108)
CO2 SERPL-SCNC: 19 MMOL/L — LOW (ref 22–31)
CREAT SERPL-MCNC: 1.75 MG/DL — HIGH (ref 0.5–1.3)
CULTURE RESULTS: SIGNIFICANT CHANGE UP
CULTURE RESULTS: SIGNIFICANT CHANGE UP
EOSINOPHIL # BLD AUTO: 0 K/UL — SIGNIFICANT CHANGE UP (ref 0–0.5)
EOSINOPHIL NFR BLD AUTO: 0 % — SIGNIFICANT CHANGE UP (ref 0–6)
GLUCOSE SERPL-MCNC: 101 MG/DL — HIGH (ref 70–99)
GRAM STN FLD: SIGNIFICANT CHANGE UP
HCT VFR BLD CALC: 25.8 % — LOW (ref 39–50)
HGB BLD-MCNC: 8.4 G/DL — LOW (ref 13–17)
IMM GRANULOCYTES NFR BLD AUTO: 1.3 % — SIGNIFICANT CHANGE UP (ref 0–1.5)
LYMPHOCYTES # BLD AUTO: 1.58 K/UL — SIGNIFICANT CHANGE UP (ref 1–3.3)
LYMPHOCYTES # BLD AUTO: 6 % — LOW (ref 13–44)
MAGNESIUM SERPL-MCNC: 1.7 MG/DL — SIGNIFICANT CHANGE UP (ref 1.6–2.6)
MCHC RBC-ENTMCNC: 29.7 PG — SIGNIFICANT CHANGE UP (ref 27–34)
MCHC RBC-ENTMCNC: 32.6 GM/DL — SIGNIFICANT CHANGE UP (ref 32–36)
MCV RBC AUTO: 91.2 FL — SIGNIFICANT CHANGE UP (ref 80–100)
METHOD TYPE: SIGNIFICANT CHANGE UP
MONOCYTES # BLD AUTO: 1.29 K/UL — HIGH (ref 0–0.9)
MONOCYTES NFR BLD AUTO: 4.9 % — SIGNIFICANT CHANGE UP (ref 2–14)
NEUTROPHILS # BLD AUTO: 22.83 K/UL — HIGH (ref 1.8–7.4)
NEUTROPHILS NFR BLD AUTO: 87.2 % — HIGH (ref 43–77)
NRBC # BLD: 0 /100 WBCS — SIGNIFICANT CHANGE UP (ref 0–0)
ORGANISM # SPEC MICROSCOPIC CNT: SIGNIFICANT CHANGE UP
PHOSPHATE SERPL-MCNC: 4.1 MG/DL — SIGNIFICANT CHANGE UP (ref 2.5–4.5)
PLATELET # BLD AUTO: 622 K/UL — HIGH (ref 150–400)
POTASSIUM SERPL-MCNC: 3.9 MMOL/L — SIGNIFICANT CHANGE UP (ref 3.5–5.3)
POTASSIUM SERPL-SCNC: 3.9 MMOL/L — SIGNIFICANT CHANGE UP (ref 3.5–5.3)
RBC # BLD: 2.83 M/UL — LOW (ref 4.2–5.8)
RBC # FLD: 19.9 % — HIGH (ref 10.3–14.5)
SODIUM SERPL-SCNC: 146 MMOL/L — HIGH (ref 135–145)
SPECIMEN SOURCE: SIGNIFICANT CHANGE UP
SPECIMEN SOURCE: SIGNIFICANT CHANGE UP
WBC # BLD: 26.2 K/UL — HIGH (ref 3.8–10.5)
WBC # FLD AUTO: 26.2 K/UL — HIGH (ref 3.8–10.5)

## 2019-05-27 PROCEDURE — 36000 PLACE NEEDLE IN VEIN: CPT

## 2019-05-27 PROCEDURE — 76937 US GUIDE VASCULAR ACCESS: CPT | Mod: 26

## 2019-05-27 RX ADMIN — OXYCODONE AND ACETAMINOPHEN 2 TABLET(S): 5; 325 TABLET ORAL at 03:00

## 2019-05-27 RX ADMIN — MORPHINE SULFATE 4 MILLIGRAM(S): 50 CAPSULE, EXTENDED RELEASE ORAL at 05:04

## 2019-05-27 RX ADMIN — Medication 100 MILLIGRAM(S): at 05:02

## 2019-05-27 RX ADMIN — OXYCODONE AND ACETAMINOPHEN 2 TABLET(S): 5; 325 TABLET ORAL at 22:50

## 2019-05-27 RX ADMIN — MORPHINE SULFATE 2 MILLIGRAM(S): 50 CAPSULE, EXTENDED RELEASE ORAL at 11:11

## 2019-05-27 RX ADMIN — HEPARIN SODIUM 5000 UNIT(S): 5000 INJECTION INTRAVENOUS; SUBCUTANEOUS at 05:56

## 2019-05-27 RX ADMIN — HEPARIN SODIUM 5000 UNIT(S): 5000 INJECTION INTRAVENOUS; SUBCUTANEOUS at 17:17

## 2019-05-27 RX ADMIN — OXYCODONE AND ACETAMINOPHEN 2 TABLET(S): 5; 325 TABLET ORAL at 12:11

## 2019-05-27 RX ADMIN — MORPHINE SULFATE 4 MILLIGRAM(S): 50 CAPSULE, EXTENDED RELEASE ORAL at 05:19

## 2019-05-27 RX ADMIN — MORPHINE SULFATE 4 MILLIGRAM(S): 50 CAPSULE, EXTENDED RELEASE ORAL at 18:36

## 2019-05-27 RX ADMIN — PANTOPRAZOLE SODIUM 40 MILLIGRAM(S): 20 TABLET, DELAYED RELEASE ORAL at 05:56

## 2019-05-27 RX ADMIN — OXYCODONE AND ACETAMINOPHEN 2 TABLET(S): 5; 325 TABLET ORAL at 17:17

## 2019-05-27 RX ADMIN — OXYCODONE AND ACETAMINOPHEN 2 TABLET(S): 5; 325 TABLET ORAL at 02:02

## 2019-05-27 RX ADMIN — OXYCODONE AND ACETAMINOPHEN 2 TABLET(S): 5; 325 TABLET ORAL at 18:26

## 2019-05-27 RX ADMIN — MORPHINE SULFATE 4 MILLIGRAM(S): 50 CAPSULE, EXTENDED RELEASE ORAL at 19:09

## 2019-05-27 RX ADMIN — Medication 1 GRAM(S): at 23:34

## 2019-05-27 RX ADMIN — OXYCODONE AND ACETAMINOPHEN 2 TABLET(S): 5; 325 TABLET ORAL at 11:13

## 2019-05-27 RX ADMIN — OXYCODONE AND ACETAMINOPHEN 2 TABLET(S): 5; 325 TABLET ORAL at 22:03

## 2019-05-27 RX ADMIN — Medication 1 GRAM(S): at 17:17

## 2019-05-27 RX ADMIN — PANTOPRAZOLE SODIUM 40 MILLIGRAM(S): 20 TABLET, DELAYED RELEASE ORAL at 18:38

## 2019-05-27 RX ADMIN — MORPHINE SULFATE 2 MILLIGRAM(S): 50 CAPSULE, EXTENDED RELEASE ORAL at 10:14

## 2019-05-27 RX ADMIN — Medication 100 MILLIGRAM(S): at 22:05

## 2019-05-27 RX ADMIN — NAFCILLIN 200 GRAM(S): 10 INJECTION, POWDER, FOR SOLUTION INTRAVENOUS at 02:03

## 2019-05-27 RX ADMIN — NAFCILLIN 200 GRAM(S): 10 INJECTION, POWDER, FOR SOLUTION INTRAVENOUS at 18:38

## 2019-05-27 RX ADMIN — NAFCILLIN 200 GRAM(S): 10 INJECTION, POWDER, FOR SOLUTION INTRAVENOUS at 22:04

## 2019-05-27 RX ADMIN — Medication 1 MILLIGRAM(S): at 22:04

## 2019-05-27 RX ADMIN — Medication 1 GRAM(S): at 05:56

## 2019-05-27 RX ADMIN — Medication 1 GRAM(S): at 11:14

## 2019-05-27 RX ADMIN — NAFCILLIN 200 GRAM(S): 10 INJECTION, POWDER, FOR SOLUTION INTRAVENOUS at 05:55

## 2019-05-27 NOTE — PROGRESS NOTE ADULT - SUBJECTIVE AND OBJECTIVE BOX
INTERVAL HPI/OVERNIGHT EVENTS:  Pt seen and examined at bedside. Patient states his pain is 10/10, requesting for morphine. Per RN, patient is requesting for pain medication around the clock. Tolerating reg diet, denies N/V. +flatus/BM.   Pt denies chest pain, SOB, dizziness, fever, chills.     Vital Signs Last 24 Hrs  T(C): 37.3 (27 May 2019 00:15), Max: 37.4 (26 May 2019 05:48)  T(F): 99.1 (27 May 2019 00:15), Max: 99.4 (26 May 2019 05:48)  HR: 96 (27 May 2019 00:15) (96 - 108)  BP: 159/84 (27 May 2019 00:15) (158/96 - 166/92)  RR: 18 (27 May 2019 00:15) (17 - 18)  SpO2: 98% (27 May 2019 00:15) (94% - 99%)    PHYSICAL EXAM:    PHYSICAL EXAM:  GENERAL: NAD, awake, alert  CHEST/LUNG: Clear to ausculation, bilaterally   HEART: RRR S1S2  ABDOMEN: Midline wound clean and dry with staples intact, no drainage or bleeding. JPs intact with minimal drainage, all with serous output, +BS, soft, mild postop area tenderness  EXTREMITIES:  calf soft, non tender b/l    I&O's Detail    25 May 2019 07:01  -  26 May 2019 07:00  --------------------------------------------------------  IN:    Oral Fluid: 120 mL    Solution: 300 mL    Solution: 200 mL  Total IN: 620 mL    OUT:    Bulb: 17.5 mL    Drain: 10 mL    Drain: 17.5 mL    Drain: 40 mL    Voided: 150 mL  Total OUT: 235 mL    Total NET: 385 mL      26 May 2019 07:01  -  27 May 2019 05:46  --------------------------------------------------------  IN:    Solution: 50 mL    Solution: 100 mL  Total IN: 150 mL    OUT:    Bulb: 7 mL    Drain: 9.5 mL    Drain: 7.5 mL    Drain: 11 mL    Voided: 500 mL  Total OUT: 535 mL    Total NET: -385 mL          LABS:                        8.3    25.58 )-----------( 560      ( 26 May 2019 08:05 )             25.6     05-26    144  |  118<H>  |  22  ----------------------------<  94  3.1<L>   |  17<L>  |  1.61<H>    Ca    7.6<L>      26 May 2019 08:05  Phos  4.0     05-26  Mg     1.7     05-26      Impression: 58M PMH ETOH abuse, pancreatitis, hep C a/w gastric perforation POD#11 s/p ex-lap, yusef patch repair, peritoneal lavage. Found to have retrogastric collection and/or lateral perihepatic/subcapsular collection drainage s/sp perc drainage 5/22  UGIS showed no leak or extravasation    Plan:  - cont drains to self suction, monitor output  - continue antibiotics per ID, trend WBC  - pain management  - cont. LRD as tolerated  - will get a medical consult  - DVT ppx, GI ppx, incentive spirometer, increase activity with PT  - will d/w surgical attending

## 2019-05-28 LAB
ANION GAP SERPL CALC-SCNC: 9 MMOL/L — SIGNIFICANT CHANGE UP (ref 5–17)
BUN SERPL-MCNC: 18 MG/DL — SIGNIFICANT CHANGE UP (ref 7–23)
CALCIUM SERPL-MCNC: 7.6 MG/DL — LOW (ref 8.5–10.1)
CHLORIDE SERPL-SCNC: 121 MMOL/L — HIGH (ref 96–108)
CO2 SERPL-SCNC: 20 MMOL/L — LOW (ref 22–31)
CREAT SERPL-MCNC: 1.9 MG/DL — HIGH (ref 0.5–1.3)
CULTURE RESULTS: SIGNIFICANT CHANGE UP
GLUCOSE SERPL-MCNC: 83 MG/DL — SIGNIFICANT CHANGE UP (ref 70–99)
HCT VFR BLD CALC: 22.3 % — LOW (ref 39–50)
HGB BLD-MCNC: 7.3 G/DL — LOW (ref 13–17)
MAGNESIUM SERPL-MCNC: 1.5 MG/DL — LOW (ref 1.6–2.6)
MCHC RBC-ENTMCNC: 29.3 PG — SIGNIFICANT CHANGE UP (ref 27–34)
MCHC RBC-ENTMCNC: 32.7 GM/DL — SIGNIFICANT CHANGE UP (ref 32–36)
MCV RBC AUTO: 89.6 FL — SIGNIFICANT CHANGE UP (ref 80–100)
NRBC # BLD: 0 /100 WBCS — SIGNIFICANT CHANGE UP (ref 0–0)
PHOSPHATE SERPL-MCNC: 3.7 MG/DL — SIGNIFICANT CHANGE UP (ref 2.5–4.5)
PLATELET # BLD AUTO: 645 K/UL — HIGH (ref 150–400)
POTASSIUM SERPL-MCNC: 3.5 MMOL/L — SIGNIFICANT CHANGE UP (ref 3.5–5.3)
POTASSIUM SERPL-SCNC: 3.5 MMOL/L — SIGNIFICANT CHANGE UP (ref 3.5–5.3)
RBC # BLD: 2.49 M/UL — LOW (ref 4.2–5.8)
RBC # FLD: 20.1 % — HIGH (ref 10.3–14.5)
SODIUM SERPL-SCNC: 150 MMOL/L — HIGH (ref 135–145)
SPECIMEN SOURCE: SIGNIFICANT CHANGE UP
WBC # BLD: 22.75 K/UL — HIGH (ref 3.8–10.5)
WBC # FLD AUTO: 22.75 K/UL — HIGH (ref 3.8–10.5)

## 2019-05-28 PROCEDURE — 99233 SBSQ HOSP IP/OBS HIGH 50: CPT

## 2019-05-28 RX ADMIN — NAFCILLIN 200 GRAM(S): 10 INJECTION, POWDER, FOR SOLUTION INTRAVENOUS at 05:47

## 2019-05-28 RX ADMIN — PANTOPRAZOLE SODIUM 40 MILLIGRAM(S): 20 TABLET, DELAYED RELEASE ORAL at 05:47

## 2019-05-28 RX ADMIN — Medication 1 GRAM(S): at 11:15

## 2019-05-28 RX ADMIN — OXYCODONE AND ACETAMINOPHEN 2 TABLET(S): 5; 325 TABLET ORAL at 22:29

## 2019-05-28 RX ADMIN — OXYCODONE AND ACETAMINOPHEN 2 TABLET(S): 5; 325 TABLET ORAL at 18:08

## 2019-05-28 RX ADMIN — OXYCODONE AND ACETAMINOPHEN 2 TABLET(S): 5; 325 TABLET ORAL at 12:20

## 2019-05-28 RX ADMIN — NAFCILLIN 200 GRAM(S): 10 INJECTION, POWDER, FOR SOLUTION INTRAVENOUS at 21:29

## 2019-05-28 RX ADMIN — OXYCODONE AND ACETAMINOPHEN 2 TABLET(S): 5; 325 TABLET ORAL at 17:10

## 2019-05-28 RX ADMIN — Medication 1 GRAM(S): at 23:21

## 2019-05-28 RX ADMIN — NAFCILLIN 200 GRAM(S): 10 INJECTION, POWDER, FOR SOLUTION INTRAVENOUS at 13:12

## 2019-05-28 RX ADMIN — HEPARIN SODIUM 5000 UNIT(S): 5000 INJECTION INTRAVENOUS; SUBCUTANEOUS at 05:47

## 2019-05-28 RX ADMIN — NAFCILLIN 200 GRAM(S): 10 INJECTION, POWDER, FOR SOLUTION INTRAVENOUS at 02:42

## 2019-05-28 RX ADMIN — Medication 1 GRAM(S): at 17:10

## 2019-05-28 RX ADMIN — Medication 100 MILLIGRAM(S): at 21:29

## 2019-05-28 RX ADMIN — PANTOPRAZOLE SODIUM 40 MILLIGRAM(S): 20 TABLET, DELAYED RELEASE ORAL at 17:10

## 2019-05-28 RX ADMIN — OXYCODONE AND ACETAMINOPHEN 2 TABLET(S): 5; 325 TABLET ORAL at 21:29

## 2019-05-28 RX ADMIN — OXYCODONE AND ACETAMINOPHEN 2 TABLET(S): 5; 325 TABLET ORAL at 11:15

## 2019-05-28 RX ADMIN — HEPARIN SODIUM 5000 UNIT(S): 5000 INJECTION INTRAVENOUS; SUBCUTANEOUS at 17:10

## 2019-05-28 RX ADMIN — Medication 1 GRAM(S): at 05:47

## 2019-05-28 RX ADMIN — NAFCILLIN 200 GRAM(S): 10 INJECTION, POWDER, FOR SOLUTION INTRAVENOUS at 17:10

## 2019-05-28 RX ADMIN — Medication 100 MILLIGRAM(S): at 05:47

## 2019-05-28 RX ADMIN — Medication 100 MILLIGRAM(S): at 13:12

## 2019-05-28 NOTE — PROGRESS NOTE ADULT - SUBJECTIVE AND OBJECTIVE BOX
Patient is a 58y old  Male who presents with a chief complaint of Abdominal pain, vomiting (28 May 2019 14:49)      INTERVAL HPI / OVERNIGHT EVENTS: doing ok     MEDICATIONS  (STANDING):  chlorhexidine 4% Liquid 1 Application(s) Topical <User Schedule>  heparin  Injectable 5000 Unit(s) SubCutaneous every 12 hours  metroNIDAZOLE  IVPB 500 milliGRAM(s) IV Intermittent every 8 hours  nafcillin  IVPB 2 Gram(s) IV Intermittent every 4 hours  nafcillin  IVPB      pantoprazole  Injectable 40 milliGRAM(s) IV Push every 12 hours  sucralfate 1 Gram(s) Oral every 6 hours    MEDICATIONS  (PRN):  LORazepam   Injectable 2 milliGRAM(s) IV Push every 2 hours PRN agitation/ restlessness  oxyCODONE    5 mG/acetaminophen 325 mG 2 Tablet(s) Oral every 4 hours PRN Moderate Pain (4 - 6)      Vital Signs Last 24 Hrs  T(C): 37.4 (28 May 2019 11:34), Max: 37.4 (28 May 2019 11:34)  T(F): 99.3 (28 May 2019 11:34), Max: 99.3 (28 May 2019 11:34)  HR: 91 (28 May 2019 11:34) (78 - 98)  BP: 148/83 (28 May 2019 11:34) (132/73 - 171/70)  BP(mean): --  RR: 17 (28 May 2019 11:34) (16 - 17)  SpO2: 97% (28 May 2019 11:34) (95% - 97%)    Review of systems:  General : no fever /chills, fatigue  CVS : no chest pain, palpitations  Lungs : no shortness of breath, cough  GI : no abdominal pain, vomiting, diarrhea   : no dysuria, hematuria        PHYSICAL EXAM:  General :NAD  Constitutional:  well-groomed, well-developed  Respiratory: CTAB/L  Cardiovascular: S1 and S2, RRR, no M/G/R  Gastrointestinal: BS+, s/p ex lap SSI clean, drains X 4  Extremities: No peripheral edema  Vascular: 2+ peripheral pulses  Skin: No rashes      LABS:                        7.3    22.75 )-----------( 645      ( 28 May 2019 06:57 )             22.3     05-28    150<H>  |  121<H>  |  18  ----------------------------<  83  3.5   |  20<L>  |  1.90<H>    Ca    7.6<L>      28 May 2019 06:57  Phos  3.7     05-28  Mg     1.5     05-28            MICROBIOLOGY:  RECENT CULTURES:  05-23 .Other RIGHT UPPER QUADRANT ABSCESS XXXX XXXX   No Protozoa seen by trichrome stain  (routine O+P not evaluated for Microsporidia,  Cryptosporidia, Cyclospora, or Isospora.)    05-22 .Blood Staphylococcus aureus  Blood Culture PCR   Growth in aerobic bottle: Gram Positive Cocci in Clusters   No growth at 5 days.          RADIOLOGY & ADDITIONAL STUDIES:

## 2019-05-28 NOTE — CHART NOTE - NSCHARTNOTEFT_GEN_A_CORE
Assessment:     Pt c ETOH abuse, pancreatitis, adm c gastric perforation,  POD#11 s/p ex-lap, yusef patch repair, peritoneal lavage.  Pt c poor oral intake, stated that he would eat better if he go the right meds, c/o pain, requesting morphine    Factors impacting intake: [ ] none [ ] nausea  [ ] vomiting [ ] diarrhea [ ] constipation  [ ]chewing problems [ ] swallowing issues  [x ] other: s/p surgery as per above, c/o pain    Diet Prescription: Diet, Regular:   Fiber/Residue Restricted (05-26-19 @ 05:46)    Intake: <25%    Current Weight: 05/28, 74.6 kg, 05/16, 62.7 kg, c wt. gain of 11.9 kg  % Weight Change: 18.9%  2+ edema of feet , ankles, perineum, generalized, 4+ edema of arms noted     Pertinent Medications: MEDICATIONS  (STANDING):  chlorhexidine 4% Liquid 1 Application(s) Topical <User Schedule>  heparin  Injectable 5000 Unit(s) SubCutaneous every 12 hours  metroNIDAZOLE  IVPB 500 milliGRAM(s) IV Intermittent every 8 hours  nafcillin  IVPB 2 Gram(s) IV Intermittent every 4 hours  nafcillin  IVPB      pantoprazole  Injectable 40 milliGRAM(s) IV Push every 12 hours  sucralfate 1 Gram(s) Oral every 6 hours    MEDICATIONS  (PRN):  LORazepam   Injectable 2 milliGRAM(s) IV Push every 2 hours PRN agitation/ restlessness  oxyCODONE    5 mG/acetaminophen 325 mG 2 Tablet(s) Oral every 4 hours PRN Moderate Pain (4 - 6)    Pertinent Labs: 05-28 Na150 mmol/L<H> Glu 83 mg/dL K+ 3.5 mmol/L Cr  1.90 mg/dL<H> BUN 18 mg/dL 05-28 Phos 3.7 mg/dL 05-23 Alb 0.8 g/dL<L>     CAPILLARY BLOOD GLUCOSE      POCT Blood Glucose.: 82 mg/dL (28 May 2019 10:41)  POCT Blood Glucose.: 91 mg/dL (28 May 2019 07:19)  POCT Blood Glucose.: 73 mg/dL (27 May 2019 22:11)  POCT Blood Glucose.: 77 mg/dL (27 May 2019 15:41)    Skin: w/o pressure ulcers     Estimated Needs:   [ x] no change since previous assessment; 05/17/19  [ ] recalculated:     Previous Nutrition Diagnosis:   [x ]Chronic severe Malnutrition   Etiology: Inadequate energy & protein intake related to ETOH & chronic pancreatitis  Signs/Symptoms: Severe fat loss & muscle wasting    Nutrition Diagnosis is [x ] ongoing  [ ] resolved [x ] not applicable ( see diagnosis change to acute severe malnutrition)     [x ] Malnutrition; severe malnutrition in context of acute illness     Related to: inadequate protein-energy intake in setting of gastric perforation, s/p exploratory laparotomy, yusef patch      As evidenced by: <50% nutrition needs x 12 days, 2+ & 4+ edema     Interventions:   Recommend  [x ] Change Diet To: Low sodium , low fiber   [x ] Nutrition Supplement; Recommend add Ensure Clear 8 oz 3x/day (720 elizabeth, 24 gm pro) & No Carb Prosource 2 pkg daily=120 calories, 30 grams protein   [x ] Nutrition Support; if oral intake remains poor, recommend consider temporary nutrition support c PPN  [ ] Other:     Monitoring and Evaluation:   [ x] PO intake [ x ] Tolerance to diet prescription [ x ] weights [ x ] labs; renal indices [ x ] follow up per protocol  [ ] other: Assessment:     Pt c ETOH abuse, pancreatitis, adm c gastric perforation,  POD#11 s/p ex-lap, yusef patch repair, peritoneal lavage.  Pt c poor oral intake, stated that he would eat better if he go the right meds, c/o pain, requesting morphine    Factors impacting intake: [ ] none [ ] nausea  [ ] vomiting [ ] diarrhea [ ] constipation  [ ]chewing problems [ ] swallowing issues  [x ] other: s/p surgery as per above, c/o pain    Diet Prescription: Diet, Regular:   Fiber/Residue Restricted (05-26-19 @ 05:46)    Intake: <25%    Current Weight: 05/28, 74.6 kg, 05/16, 62.7 kg, c wt. gain of 11.9 kg  % Weight Change: 18.9%  2+ edema of feet , ankles, perineum, generalized, 4+ edema of arms noted     Pertinent Medications: MEDICATIONS  (STANDING):  chlorhexidine 4% Liquid 1 Application(s) Topical <User Schedule>  heparin  Injectable 5000 Unit(s) SubCutaneous every 12 hours  metroNIDAZOLE  IVPB 500 milliGRAM(s) IV Intermittent every 8 hours  nafcillin  IVPB 2 Gram(s) IV Intermittent every 4 hours  nafcillin  IVPB      pantoprazole  Injectable 40 milliGRAM(s) IV Push every 12 hours  sucralfate 1 Gram(s) Oral every 6 hours    MEDICATIONS  (PRN):  LORazepam   Injectable 2 milliGRAM(s) IV Push every 2 hours PRN agitation/ restlessness  oxyCODONE    5 mG/acetaminophen 325 mG 2 Tablet(s) Oral every 4 hours PRN Moderate Pain (4 - 6)    Pertinent Labs: 05-28 Na150 mmol/L<H> Glu 83 mg/dL K+ 3.5 mmol/L Cr  1.90 mg/dL<H> BUN 18 mg/dL 05-28 Phos 3.7 mg/dL 05-23 Alb 0.8 g/dL<L>     CAPILLARY BLOOD GLUCOSE      POCT Blood Glucose.: 82 mg/dL (28 May 2019 10:41)  POCT Blood Glucose.: 91 mg/dL (28 May 2019 07:19)  POCT Blood Glucose.: 73 mg/dL (27 May 2019 22:11)  POCT Blood Glucose.: 77 mg/dL (27 May 2019 15:41)    Skin: w/o pressure ulcers     Estimated Needs:   [ x] no change since previous assessment; 05/17/19  [ ] recalculated:     Previous Nutrition Diagnosis:   [x ]Chronic severe Malnutrition   Etiology: Inadequate energy & protein intake related to ETOH & chronic pancreatitis  Signs/Symptoms: Severe fat loss & muscle wasting    Nutrition Diagnosis is [x ] ongoing  [ ] resolved [x ] not applicable ( see diagnosis change to acute severe malnutrition)     [x ] Malnutrition; severe malnutrition in context of acute illness     Related to: inadequate protein-energy intake in setting of gastric perforation, s/p exploratory laparotomy, yusef patch      As evidenced by: <50% nutrition needs x 12 days, 2+ & 4+ edema     Interventions:   Recommend  [x ] Change Diet To: Low sodium , low fiber   [x ] Nutrition Supplement; Recommend add Ensure Clear 8 oz 3x/day (720 elizabeth, 24 gm pro)  & No Carb Prosource 2 pkg daily=120 calories, 30 grams protein   [x ] Nutrition Support; if oral intake remains poor, recommend consider temporary nutrition support c PPN  [ ] Other:     Monitoring and Evaluation:   [ x] PO intake [ x ] Tolerance to diet prescription [ x ] weights [ x ] labs; renal indices [ x ] follow up per protocol  [ ] other:

## 2019-05-28 NOTE — PROGRESS NOTE ADULT - SUBJECTIVE AND OBJECTIVE BOX
Surgery NP note  Patient seen and examined bedside   No complaints offered. Abdominal pain is well controlled.  Denies nausea and vomiting. Tolerating diet.  Normal flatus/BM.     T(F): 99.3 (05-28-19 @ 11:34), Max: 99.3 (05-28-19 @ 11:34)  HR: 91 (05-28-19 @ 11:34) (78 - 98)  BP: 148/83 (05-28-19 @ 11:34) (132/73 - 178/77)  RR: 17 (05-28-19 @ 11:34) (16 - 17)  SpO2: 97% (05-28-19 @ 11:34) (95% - 98%)  Wt(kg): --  CAPILLARY BLOOD GLUCOSE      POCT Blood Glucose.: 82 mg/dL (28 May 2019 10:41)  POCT Blood Glucose.: 91 mg/dL (28 May 2019 07:19)  POCT Blood Glucose.: 73 mg/dL (27 May 2019 22:11)  POCT Blood Glucose.: 77 mg/dL (27 May 2019 15:41)      PHYSICAL EXAM:  General: NAD, WDWN.   Neuro:  Alert & responsive  HEENT: NCAT, EOMI, conjunctiva clear  CV: +S1+S2 regular rate and rhythm  Lung: clear to ausculation bilaterally, respirations nonlabored, good inspiratory effort  ABDOMEN: Midline wound clean and dry with staples intact, no drainage or bleeding. JPs intact with minimal drainage, all with serous output, +BS, soft, mild postop area tenderness  Extremities: no pedal edema or calf tenderness noted     LABS:                        7.3    22.75 )-----------( 645      ( 28 May 2019 06:57 )             22.3     05-28    150<H>  |  121<H>  |  18  ----------------------------<  83  3.5   |  20<L>  |  1.90<H>    Ca    7.6<L>      28 May 2019 06:57  Phos  3.7     05-28  Mg     1.5     05-28          I&O's Detail    27 May 2019 07:01  -  28 May 2019 07:00  --------------------------------------------------------  IN:    Oral Fluid: 450 mL    Solution: 100 mL    Solution: 300 mL  Total IN: 850 mL    OUT:    Bulb: 1 mL    Drain: 1 mL    Drain: 10 mL    Voided: 1100 mL  Total OUT: 1112 mL    Total NET: -262 mL    Culture - Blood (05.22.19 @ 00:56)    Gram Stain:   Growth in aerobic bottle: Gram Positive Cocci in Clusters    -  Ampicillin/Sulbactam: S <=8/4    -  Clindamycin: S 0.5    -  Erythromycin: S <=0.25    -  Cefazolin: S <=4    -  Gentamicin: S <=1 Should not be used as monotherapy    -  Oxacillin: S <=0.25    -  Penicillin: R 8    -  RIF- Rifampin: S <=1 Should not be used as monotherapy    -  Trimethoprim/Sulfamethoxazole: S <=0.5/9.5    -  Vancomycin: S 2    -  Tetra/Doxy: S <=1    -  Staphylococcus aureus: Detec Any isolate of Staphylococcus aureus from a blood culture is NOT considered a contaminant.    Specimen Source: .Blood    Organism: Staphylococcus aureus    Organism: Blood Culture PCR    Culture Results:   Growth in aerobic bottle: Staphylococcus aureus  "Due to technical problems, Proteus sp. will Not be reported as part of  the BCID panel until further notice"  ***Blood Panel PCR results on this specimen are available  approximately 3 hours after the Gram stain result.***  Gram stain, PCR, and/or culture results may not always  correspond due to difference in methodologies.  ************************************************************  This PCR assay was performed using [employer].  The following targets are tested for: Enterococcus,  vancomycin resistant enterococci, Listeria monocytogenes,  coagulase negative staphylococci, S. aureus,  methicillin resistant S. aureus, Streptococcus agalactiae  (Group B), S. pneumoniae, S. pyogenes (Group A),  Acinetobacter baumannii, Enterobacter cloacae, E. coli,  Klebsiella oxytoca, K. pneumoniae, Proteus sp.,  Serratia marcescens, Haemophilus influenzae,  Neisseria meningitidis, Pseudomonas aeruginosa, Candida  albicans, C. glabrata, C krusei, C parapsilosis,  C. tropicalis and the KPC resistance gene.    Organism Identification: Staphylococcus aureus  Blood Culture PCR    Method Type: PCR    Method Type: TABITHA        Impression: 58M PMH ETOH abuse, pancreatitis, hep C a/w gastric perforation POD#12 s/p ex-lap, yusef patch repair, peritoneal lavage. Found to have retrogastric collection and/or lateral perihepatic/subcapsular collection drainage s/sp perc drainage 5/22  UGIS showed no leak or extravasation, NOW with Positive blood cultures    Plan:  - discussed with Dr Ramachandran who recc ISAIAS - Cardiology consult with Dr Longo  - cont drains to self suction, monitor output  - continue antibiotics per ID, trend WBC  - pain management  - cont. LRD as tolerated  - Continue with medical consulta  - DVT ppx, GI ppx, incentive spirometer, increase activity with PT  - will d/w surgical attending

## 2019-05-28 NOTE — PROGRESS NOTE ADULT - PROBLEM SELECTOR PLAN 2
sec to above  repeat blood c/s MSSA from 5/24  ECHO neg for  IE  will need ISAIAS for persistent Bacteremia sec to above  blood c/s positive for MSSA from 5/24 and on admission   ECHO neg for  IE  will need ISAIAS for persistent Bacteremia  Repeat blood c/s today sec to above  blood c/s positive for MSSA from 5/24 and on admission   ECHO neg for  IE  will need ISAIAS for persistent Bacteremia  Repeat blood c/s today  MRI spine to evaluate for diskitis /OM

## 2019-05-28 NOTE — PROGRESS NOTE ADULT - PROBLEM SELECTOR PLAN 1
New CT shows more collection  s/p IR drainage of RUQ parahepatic collection (250 cc purulent fluid was drained )  follow up on new c/s from RUQ drain so far neg   O/P fungal all neg  gent level 3.2   can be redosed tomorrow   cont current antibiotics(nafcillin and flagyl ) New CT shows more collection  s/p IR drainage of RUQ parahepatic collection (250 cc purulent fluid was drained )  follow up on new c/s from RUQ drain so far neg   O/P fungal all neg  gent redsoe today  cont current antibiotics(nafcillin and flagyl )

## 2019-05-28 NOTE — CONSULT NOTE ADULT - SUBJECTIVE AND OBJECTIVE BOX
HPI:  HPI:  57 y/o male PMHx ETOH abuse, chronic pancreatitis, treated hepatitis C, gout c/o severe lower abdominal pain x 3 days. Pain worsened today. Pain is associated with nausea and multiple episodes of clear vomiting. Patient took 2 aspirins which did not relieve the pain. Last BM was 3 days ago. Last flatus was today. Patient denies fever, chills, constipation, diarrhea, hematuria, melena, hematochezia, chest pain, shortness of breath, dizziness. Patient denies prior incident. (16 May 2019 02:18)      Chief Complaint:  Patient is a 58y old  Male who presents with a chief complaint of Abdominal pain, vomiting (28 May 2019 11:54)      Review of Systems:    General:  No wt loss, fevers, chills, night sweats  Eyes:  Good vision, no reported pain  ENT:  No sore throat, pain, runny nose, dysphagia  CV:  No pain, palpitations, hypo/hypertension  Resp:  No dyspnea, cough, tachypnea, wheezing           Social History/Family History  SOCHX:   tobacco,  -  alcohol    FMHX: FA/MO  - contributory       Discussed with:  PMD, Family    Physical Exam:    Vital Signs:  Vital Signs Last 24 Hrs  T(C): 37.4 (28 May 2019 11:34), Max: 37.4 (28 May 2019 11:34)  T(F): 99.3 (28 May 2019 11:34), Max: 99.3 (28 May 2019 11:34)  HR: 91 (28 May 2019 11:34) (78 - 98)  BP: 148/83 (28 May 2019 11:34) (132/73 - 171/70)  BP(mean): --  RR: 17 (28 May 2019 11:34) (16 - 17)  SpO2: 97% (28 May 2019 11:34) (95% - 97%)  Daily     Daily Weight in k.6 (28 May 2019 05:26)  I&O's Summary    27 May 2019 07:01  -  28 May 2019 07:00  --------------------------------------------------------  IN: 850 mL / OUT: 1112 mL / NET: -262 mL       conjunctivae clear, no thyromegaly, nodules, adenopathy, no JVD  Chest:  Full & symmetric excursion, no increased effort, breath sounds clear  Cardiovascular:  Regular rhythm, S1, S2, no murmur/rub/S3/S4, no carotid/femoral/abdominal bruit, radial/pedal pulses 2+,  edema  Abdomen:  Soft, non-tender, non-distended, normoactive bowel sounds, no HSM      Laboratory:                          7.3    22.75 )-----------( 645      ( 28 May 2019 06:57 )             22.3     05-28    150<H>  |  121<H>  |  18  ----------------------------<  83  3.5   |  20<L>  |  1.90<H>    Ca    7.6<L>      28 May 2019 06:57  Phos  3.7       Mg     1.5                 CAPILLARY BLOOD GLUCOSE      POCT Blood Glucose.: 82 mg/dL (28 May 2019 10:41)  POCT Blood Glucose.: 91 mg/dL (28 May 2019 07:19)  POCT Blood Glucose.: 73 mg/dL (27 May 2019 22:11)  POCT Blood Glucose.: 77 mg/dL (27 May 2019 15:41)            Assessment:  Bacteremia  Long history and hospital course noted and reviewed  ID noted  Afebrile  TTE noted  Last BC now negative  Will follow up course of BC and Temp

## 2019-05-29 DIAGNOSIS — N17.9 ACUTE KIDNEY FAILURE, UNSPECIFIED: ICD-10-CM

## 2019-05-29 LAB
ANION GAP SERPL CALC-SCNC: 8 MMOL/L — SIGNIFICANT CHANGE UP (ref 5–17)
BASOPHILS # BLD AUTO: 0.17 K/UL — SIGNIFICANT CHANGE UP (ref 0–0.2)
BASOPHILS NFR BLD AUTO: 0.8 % — SIGNIFICANT CHANGE UP (ref 0–2)
BUN SERPL-MCNC: 17 MG/DL — SIGNIFICANT CHANGE UP (ref 7–23)
CALCIUM SERPL-MCNC: 7.5 MG/DL — LOW (ref 8.5–10.1)
CHLORIDE SERPL-SCNC: 119 MMOL/L — HIGH (ref 96–108)
CO2 SERPL-SCNC: 19 MMOL/L — LOW (ref 22–31)
CREAT SERPL-MCNC: 1.9 MG/DL — HIGH (ref 0.5–1.3)
EOSINOPHIL # BLD AUTO: 0 K/UL — SIGNIFICANT CHANGE UP (ref 0–0.5)
EOSINOPHIL NFR BLD AUTO: 0 % — SIGNIFICANT CHANGE UP (ref 0–6)
GLUCOSE SERPL-MCNC: 106 MG/DL — HIGH (ref 70–99)
HCT VFR BLD CALC: 24 % — LOW (ref 39–50)
HGB BLD-MCNC: 7.9 G/DL — LOW (ref 13–17)
IMM GRANULOCYTES NFR BLD AUTO: 0.9 % — SIGNIFICANT CHANGE UP (ref 0–1.5)
LYMPHOCYTES # BLD AUTO: 1.83 K/UL — SIGNIFICANT CHANGE UP (ref 1–3.3)
LYMPHOCYTES # BLD AUTO: 9 % — LOW (ref 13–44)
MAGNESIUM SERPL-MCNC: 1.6 MG/DL — SIGNIFICANT CHANGE UP (ref 1.6–2.6)
MCHC RBC-ENTMCNC: 29.8 PG — SIGNIFICANT CHANGE UP (ref 27–34)
MCHC RBC-ENTMCNC: 32.9 GM/DL — SIGNIFICANT CHANGE UP (ref 32–36)
MCV RBC AUTO: 90.6 FL — SIGNIFICANT CHANGE UP (ref 80–100)
MONOCYTES # BLD AUTO: 0.79 K/UL — SIGNIFICANT CHANGE UP (ref 0–0.9)
MONOCYTES NFR BLD AUTO: 3.9 % — SIGNIFICANT CHANGE UP (ref 2–14)
NEUTROPHILS # BLD AUTO: 17.41 K/UL — HIGH (ref 1.8–7.4)
NEUTROPHILS NFR BLD AUTO: 85.4 % — HIGH (ref 43–77)
NRBC # BLD: 0 /100 WBCS — SIGNIFICANT CHANGE UP (ref 0–0)
PHOSPHATE SERPL-MCNC: 3.2 MG/DL — SIGNIFICANT CHANGE UP (ref 2.5–4.5)
PLATELET # BLD AUTO: 709 K/UL — HIGH (ref 150–400)
POTASSIUM SERPL-MCNC: 3.2 MMOL/L — LOW (ref 3.5–5.3)
POTASSIUM SERPL-SCNC: 3.2 MMOL/L — LOW (ref 3.5–5.3)
RBC # BLD: 2.65 M/UL — LOW (ref 4.2–5.8)
RBC # FLD: 20.1 % — HIGH (ref 10.3–14.5)
SODIUM SERPL-SCNC: 146 MMOL/L — HIGH (ref 135–145)
WBC # BLD: 20.39 K/UL — HIGH (ref 3.8–10.5)
WBC # FLD AUTO: 20.39 K/UL — HIGH (ref 3.8–10.5)

## 2019-05-29 PROCEDURE — 99233 SBSQ HOSP IP/OBS HIGH 50: CPT

## 2019-05-29 PROCEDURE — 72148 MRI LUMBAR SPINE W/O DYE: CPT | Mod: 26

## 2019-05-29 PROCEDURE — 72141 MRI NECK SPINE W/O DYE: CPT | Mod: 26

## 2019-05-29 PROCEDURE — 72146 MRI CHEST SPINE W/O DYE: CPT | Mod: 26

## 2019-05-29 RX ORDER — ONDANSETRON 8 MG/1
4 TABLET, FILM COATED ORAL EVERY 6 HOURS
Refills: 0 | Status: DISCONTINUED | OUTPATIENT
Start: 2019-05-29 | End: 2019-06-04

## 2019-05-29 RX ORDER — POTASSIUM CHLORIDE 20 MEQ
20 PACKET (EA) ORAL
Refills: 0 | Status: COMPLETED | OUTPATIENT
Start: 2019-05-29 | End: 2019-05-29

## 2019-05-29 RX ORDER — GENTAMICIN SULFATE 40 MG/ML
180 VIAL (ML) INJECTION ONCE
Refills: 0 | Status: COMPLETED | OUTPATIENT
Start: 2019-05-29 | End: 2019-05-29

## 2019-05-29 RX ADMIN — OXYCODONE AND ACETAMINOPHEN 2 TABLET(S): 5; 325 TABLET ORAL at 02:57

## 2019-05-29 RX ADMIN — OXYCODONE AND ACETAMINOPHEN 2 TABLET(S): 5; 325 TABLET ORAL at 20:10

## 2019-05-29 RX ADMIN — NAFCILLIN 200 GRAM(S): 10 INJECTION, POWDER, FOR SOLUTION INTRAVENOUS at 17:26

## 2019-05-29 RX ADMIN — Medication 100 MILLIGRAM(S): at 21:56

## 2019-05-29 RX ADMIN — OXYCODONE AND ACETAMINOPHEN 2 TABLET(S): 5; 325 TABLET ORAL at 06:10

## 2019-05-29 RX ADMIN — NAFCILLIN 200 GRAM(S): 10 INJECTION, POWDER, FOR SOLUTION INTRAVENOUS at 21:56

## 2019-05-29 RX ADMIN — HEPARIN SODIUM 5000 UNIT(S): 5000 INJECTION INTRAVENOUS; SUBCUTANEOUS at 17:24

## 2019-05-29 RX ADMIN — Medication 20 MILLIEQUIVALENT(S): at 21:56

## 2019-05-29 RX ADMIN — OXYCODONE AND ACETAMINOPHEN 2 TABLET(S): 5; 325 TABLET ORAL at 19:20

## 2019-05-29 RX ADMIN — OXYCODONE AND ACETAMINOPHEN 2 TABLET(S): 5; 325 TABLET ORAL at 14:27

## 2019-05-29 RX ADMIN — NAFCILLIN 200 GRAM(S): 10 INJECTION, POWDER, FOR SOLUTION INTRAVENOUS at 13:43

## 2019-05-29 RX ADMIN — Medication 100 MILLIGRAM(S): at 13:43

## 2019-05-29 RX ADMIN — PANTOPRAZOLE SODIUM 40 MILLIGRAM(S): 20 TABLET, DELAYED RELEASE ORAL at 05:08

## 2019-05-29 RX ADMIN — NAFCILLIN 200 GRAM(S): 10 INJECTION, POWDER, FOR SOLUTION INTRAVENOUS at 05:08

## 2019-05-29 RX ADMIN — NAFCILLIN 200 GRAM(S): 10 INJECTION, POWDER, FOR SOLUTION INTRAVENOUS at 02:00

## 2019-05-29 RX ADMIN — Medication 1 GRAM(S): at 17:24

## 2019-05-29 RX ADMIN — OXYCODONE AND ACETAMINOPHEN 2 TABLET(S): 5; 325 TABLET ORAL at 16:14

## 2019-05-29 RX ADMIN — OXYCODONE AND ACETAMINOPHEN 2 TABLET(S): 5; 325 TABLET ORAL at 02:00

## 2019-05-29 RX ADMIN — Medication 200 MILLIGRAM(S): at 17:24

## 2019-05-29 RX ADMIN — Medication 100 MILLIGRAM(S): at 05:08

## 2019-05-29 RX ADMIN — Medication 20 MILLIEQUIVALENT(S): at 19:48

## 2019-05-29 RX ADMIN — OXYCODONE AND ACETAMINOPHEN 2 TABLET(S): 5; 325 TABLET ORAL at 11:47

## 2019-05-29 RX ADMIN — Medication 1 GRAM(S): at 05:08

## 2019-05-29 RX ADMIN — OXYCODONE AND ACETAMINOPHEN 2 TABLET(S): 5; 325 TABLET ORAL at 07:10

## 2019-05-29 RX ADMIN — NAFCILLIN 200 GRAM(S): 10 INJECTION, POWDER, FOR SOLUTION INTRAVENOUS at 10:50

## 2019-05-29 RX ADMIN — PANTOPRAZOLE SODIUM 40 MILLIGRAM(S): 20 TABLET, DELAYED RELEASE ORAL at 17:25

## 2019-05-29 RX ADMIN — OXYCODONE AND ACETAMINOPHEN 2 TABLET(S): 5; 325 TABLET ORAL at 10:50

## 2019-05-29 RX ADMIN — HEPARIN SODIUM 5000 UNIT(S): 5000 INJECTION INTRAVENOUS; SUBCUTANEOUS at 05:08

## 2019-05-29 RX ADMIN — Medication 1 GRAM(S): at 13:44

## 2019-05-29 NOTE — PROGRESS NOTE ADULT - SUBJECTIVE AND OBJECTIVE BOX
59 y/o male PMHx ETOH abuse, chronic pancreatitis, treated hepatitis C, gout c/o severe lower abdominal pain x 3 days. Pain worsened today. Pain is associated with nausea and multiple episodes of clear vomiting. Patient took 2 aspirins which did not relieve the pain. Last BM was 3 days ago. Last flatus was today. Patient denies fever, chills, constipation, diarrhea, hematuria, melena, hematochezia, chest pain, shortness of breath, dizziness. Patient denies prior incident. (16 May 2019 02:18)      Chief Complaint:  Patient is a 58y old  Male who presents with a chief complaint of Abdominal pain, vomiting (28 May 2019 11:54)      Review of Systems:    General:  No wt loss, fevers, chills, night sweats  Eyes:  Good vision, no reported pain  ENT:  No sore throat, pain, runny nose, dysphagia  CV:  No pain, palpitations, hypo/hypertension  Resp:  No dyspnea, cough, tachypnea, wheezing           Social History/Family History  SOCHX:   tobacco,  -  alcohol    FMHX: FA/MO  - contributory       Discussed with:  PMD, Family    Physical Exam:    Vital Signs:  Vital Signs Last 24 Hrs  T(C): 37.4 (28 May 2019 11:34), Max: 37.4 (28 May 2019 11:34)  T(F): 99.3 (28 May 2019 11:34), Max: 99.3 (28 May 2019 11:34)  HR: 91 (28 May 2019 11:34) (78 - 98)  BP: 148/83 (28 May 2019 11:34) (132/73 - 171/70)  BP(mean): --  RR: 17 (28 May 2019 11:34) (16 - 17)  SpO2: 97% (28 May 2019 11:34) (95% - 97%)  Daily     Daily Weight in k.6 (28 May 2019 05:26)  I&O's Summary    27 May 2019 07:01  -  28 May 2019 07:00  --------------------------------------------------------  IN: 850 mL / OUT: 1112 mL / NET: -262 mL       conjunctivae clear, no thyromegaly, nodules, adenopathy, no JVD  Chest:  Full & symmetric excursion, no increased effort, breath sounds clear  Cardiovascular:  Regular rhythm, S1, S2, no murmur/rub/S3/S4, no carotid/femoral/abdominal bruit, radial/pedal pulses 2+,  edema  Abdomen:  Soft, non-tender, non-distended, normoactive bowel sounds, no HSM      Laboratory:                          7.3    22.75 )-----------( 645      ( 28 May 2019 06:57 )             22.3     05-28    150<H>  |  121<H>  |  18  ----------------------------<  83  3.5   |  20<L>  |  1.90<H>    Ca    7.6<L>      28 May 2019 06:57  Phos  3.7     -  Mg     1.5                 CAPILLARY BLOOD GLUCOSE      POCT Blood Glucose.: 82 mg/dL (28 May 2019 10:41)  POCT Blood Glucose.: 91 mg/dL (28 May 2019 07:19)  POCT Blood Glucose.: 73 mg/dL (27 May 2019 22:11)  POCT Blood Glucose.: 77 mg/dL (27 May 2019 15:41)            Assessment:  Bacteremia  Long history and hospital course noted and reviewed  ID noted  Afebrile  TTE noted  Last BC now negative, 5 days  A new BC remains negative  Remains completely afebrile

## 2019-05-29 NOTE — PROGRESS NOTE ADULT - SUBJECTIVE AND OBJECTIVE BOX
Patient is a 58y old  Male who presents with a chief complaint of Abdominal pain, vomiting (28 May 2019 14:52)      INTERVAL HPI / OVERNIGHT EVENTS: c/o vomiting,back pain present    MEDICATIONS  (STANDING):  chlorhexidine 4% Liquid 1 Application(s) Topical <User Schedule>  gentamicin   IVPB 180 milliGRAM(s) IV Intermittent once  heparin  Injectable 5000 Unit(s) SubCutaneous every 12 hours  metroNIDAZOLE  IVPB 500 milliGRAM(s) IV Intermittent every 8 hours  nafcillin  IVPB 2 Gram(s) IV Intermittent every 4 hours  nafcillin  IVPB      pantoprazole  Injectable 40 milliGRAM(s) IV Push every 12 hours  sucralfate 1 Gram(s) Oral every 6 hours    MEDICATIONS  (PRN):  LORazepam   Injectable 2 milliGRAM(s) IV Push every 2 hours PRN agitation/ restlessness  ondansetron Injectable 4 milliGRAM(s) IV Push every 6 hours PRN Nausea and/or Vomiting  oxyCODONE    5 mG/acetaminophen 325 mG 2 Tablet(s) Oral every 4 hours PRN Moderate Pain (4 - 6)      Vital Signs Last 24 Hrs  T(C): 36.7 (29 May 2019 10:56), Max: 37 (28 May 2019 23:47)  T(F): 98 (29 May 2019 10:56), Max: 98.6 (28 May 2019 23:47)  HR: 93 (29 May 2019 10:56) (76 - 93)  BP: 158/89 (29 May 2019 10:56) (130/72 - 158/89)  BP(mean): --  RR: 18 (29 May 2019 10:56) (16 - 18)  SpO2: 99% (29 May 2019 10:56) (96% - 99%)    Review of systems:  General : no fever /chills,fatigue  CVS : no chest pain, palpitations  Lungs : no shortness of breath, cough  GI : no abdominal pain,+ vomiting   : no dysuria,hematuria        PHYSICAL EXAM:  General :NAD  Constitutional:  well-groomed, well-developed  Respiratory: CTAB/L  Cardiovascular: S1 and S2, RRR, no M/G/R  Gastrointestinal: s/p E x lap .SSI clean ,no wound dehiscence ,NO wounds ,drains x 4 with sero sanguinous discharge   Extremities: No peripheral edema  Vascular: 2+ peripheral pulses  Skin: No rashes      LABS:                        7.9    20.39 )-----------( 709      ( 29 May 2019 06:22 )             24.0     05-29    146<H>  |  119<H>  |  17  ----------------------------<  106<H>  3.2<L>   |  19<L>  |  1.90<H>    Ca    7.5<L>      29 May 2019 06:22  Phos  3.2     05-29  Mg     1.6     05-29            MICROBIOLOGY:  RECENT CULTURES:  05-23 .Other RIGHT UPPER QUADRANT ABSCESS XXXX XXXX   No Protozoa seen by trichrome stain  (routine O+P not evaluated for Microsporidia,  Cryptosporidia, Cyclospora, or Isospora.)          RADIOLOGY & ADDITIONAL STUDIES:

## 2019-05-29 NOTE — PROGRESS NOTE ADULT - PROBLEM SELECTOR PLAN 2
sec to above  blood c/s positive for MSSA from 5/24 and on admission   ECHO neg for  IE  will need ISAIAS to r/o IE as positive for persistent Bacteremia (day 7 blood c/s positive )   repeat blood c/s ordered  cardiology on board  Repeat blood c/s today  MRI spine to evaluate for diskitis /OM for persistent Bacteremia and back pain (ordered )  antibiotics as above

## 2019-05-29 NOTE — PROGRESS NOTE ADULT - SUBJECTIVE AND OBJECTIVE BOX
Pt was seen and examined at bedside, resting comfortably. No complaints offered. Denies N/V. Tolerating diet.       Vital Signs Last 24 Hrs  T(C): 36.7 (29 May 2019 10:56), Max: 37 (28 May 2019 23:47)  T(F): 98 (29 May 2019 10:56), Max: 98.6 (28 May 2019 23:47)  HR: 93 (29 May 2019 10:56) (76 - 93)  BP: 158/89 (29 May 2019 10:56) (130/72 - 158/89)  BP(mean): --  RR: 18 (29 May 2019 10:56) (16 - 18)  SpO2: 99% (29 May 2019 10:56) (96% - 99%)  I&O's Summary    28 May 2019 07:01  -  29 May 2019 07:00  --------------------------------------------------------  IN: 740 mL / OUT: 600 mL / NET: 140 mL    29 May 2019 07:01  -  29 May 2019 16:30  --------------------------------------------------------  IN: 120 mL / OUT: 0 mL / NET: 120 mL        PHYSICAL EXAM:    GENERAL: Alert & Oriented X3,NAD, WD/WN  CHEST/LUNG: respirations nonlabored, good inspiratory effort  HEART: Regular rate and rhythm  ABDOMEN: Soft, NT/ND,+BS  EXTREMITIES:  No calf tenderness, No clubbing, cyanosis, or edema      LABS:                        7.9    20.39 )-----------( 709      ( 29 May 2019 06:22 )             24.0     05-29    146<H>  |  119<H>  |  17  ----------------------------<  106<H>  3.2<L>   |  19<L>  |  1.90<H>    Ca    7.5<L>      29 May 2019 06:22  Phos  3.2     05-29  Mg     1.6     05-29                RADIOLOGY & ADDITIONAL STUDIES:    Assessment:58yMale    Plan:  -continue DVT prophylaxis  -OOB, Ambulating  -continue incentive spirometer  -continue local wound care  -antibiotics per ID  -f/u labs  -will discuss pt. with surgical attending Pt was seen and examined at bedside, resting comfortably. No complaints offered. Denies N/V. Tolerating diet. Afebrile.      Vital Signs Last 24 Hrs  T(C): 36.7 (29 May 2019 10:56), Max: 37 (28 May 2019 23:47)  T(F): 98 (29 May 2019 10:56), Max: 98.6 (28 May 2019 23:47)  HR: 93 (29 May 2019 10:56) (76 - 93)  BP: 158/89 (29 May 2019 10:56) (130/72 - 158/89)  BP(mean): --  RR: 18 (29 May 2019 10:56) (16 - 18)  SpO2: 99% (29 May 2019 10:56) (96% - 99%)  I&O's Summary    28 May 2019 07:01  -  29 May 2019 07:00  --------------------------------------------------------  IN: 740 mL / OUT: 600 mL / NET: 140 mL    29 May 2019 07:01  -  29 May 2019 16:30  --------------------------------------------------------  IN: 120 mL / OUT: 0 mL / NET: 120 mL        PHYSICAL EXAM:  General: NAD, WDWN.   Neuro:  Alert & responsive  HEENT: NCAT, EOMI, conjunctiva clear  CV: +S1+S2 regular rate and rhythm  Lung: clear to ausculation bilaterally, respirations nonlabored, good inspiratory effort  ABDOMEN: Midline wound clean and dry with staples intact, no drainage or bleeding. JPs intact with minimal drainage, all with serous output, +BS, soft, mild postop area tenderness  Extremities: no pedal edema or calf tenderness noted       LABS:                        7.9    20.39 )-----------( 709      ( 29 May 2019 06:22 )             24.0     05-29    146<H>  |  119<H>  |  17  ----------------------------<  106<H>  3.2<L>   |  19<L>  |  1.90<H>    Ca    7.5<L>      29 May 2019 06:22  Phos  3.2     05-29  Mg     1.6     05-29    RADIOLOGY & ADDITIONAL STUDIES:    Impression: 58M PMH ETOH abuse, pancreatitis, hep C a/w gastric perforation POD#12 s/p ex-lap, yusef patch repair, peritoneal lavage. Found to have retrogastric collection and/or lateral perihepatic/subcapsular collection drainage s/sp perc drainage 5/22  UGIS showed no leak or extravasation, NOW with Positive blood cultures    Plan:  - Repeat CT scan in am  - cont drains to self suction, monitor output  - continue antibiotics per ID, trend WBC  - pain management  - cont. LRD as tolerated  - Continue with medical comanagement  - DVT ppx, GI ppx, incentive spirometer, increase activity with PT  - will d/w surgical attending

## 2019-05-29 NOTE — PROGRESS NOTE ADULT - PROBLEM SELECTOR PLAN 1
sec to stomach perforation s/p yusef patch repair   initial OR c/s and blood c/+ MSSA  on nafcillin for MSSA infection   ON genta (with level ) and flagyl for empiric coverage for gram neg and anaerobic coverage

## 2019-05-30 LAB
ANION GAP SERPL CALC-SCNC: 9 MMOL/L — SIGNIFICANT CHANGE UP (ref 5–17)
APPEARANCE UR: CLEAR — SIGNIFICANT CHANGE UP
BACTERIA # UR AUTO: ABNORMAL
BILIRUB UR-MCNC: NEGATIVE — SIGNIFICANT CHANGE UP
BUN SERPL-MCNC: 17 MG/DL — SIGNIFICANT CHANGE UP (ref 7–23)
CALCIUM SERPL-MCNC: 7.8 MG/DL — LOW (ref 8.5–10.1)
CHLORIDE SERPL-SCNC: 122 MMOL/L — HIGH (ref 96–108)
CO2 SERPL-SCNC: 18 MMOL/L — LOW (ref 22–31)
COLOR SPEC: YELLOW — SIGNIFICANT CHANGE UP
CREAT SERPL-MCNC: 2.33 MG/DL — HIGH (ref 0.5–1.3)
DIFF PNL FLD: ABNORMAL
EPI CELLS # UR: SIGNIFICANT CHANGE UP
GLUCOSE SERPL-MCNC: 91 MG/DL — SIGNIFICANT CHANGE UP (ref 70–99)
GLUCOSE UR QL: NEGATIVE MG/DL — SIGNIFICANT CHANGE UP
GRAN CASTS # UR COMP ASSIST: ABNORMAL /LPF
HCT VFR BLD CALC: 26.2 % — LOW (ref 39–50)
HGB BLD-MCNC: 8.4 G/DL — LOW (ref 13–17)
KETONES UR-MCNC: NEGATIVE — SIGNIFICANT CHANGE UP
LEUKOCYTE ESTERASE UR-ACNC: ABNORMAL
MAGNESIUM SERPL-MCNC: 1.6 MG/DL — SIGNIFICANT CHANGE UP (ref 1.6–2.6)
MCHC RBC-ENTMCNC: 29.4 PG — SIGNIFICANT CHANGE UP (ref 27–34)
MCHC RBC-ENTMCNC: 32.1 GM/DL — SIGNIFICANT CHANGE UP (ref 32–36)
MCV RBC AUTO: 91.6 FL — SIGNIFICANT CHANGE UP (ref 80–100)
NITRITE UR-MCNC: NEGATIVE — SIGNIFICANT CHANGE UP
NRBC # BLD: 0 /100 WBCS — SIGNIFICANT CHANGE UP (ref 0–0)
PH UR: 6 — SIGNIFICANT CHANGE UP (ref 5–8)
PHOSPHATE SERPL-MCNC: 3.7 MG/DL — SIGNIFICANT CHANGE UP (ref 2.5–4.5)
PLATELET # BLD AUTO: 789 K/UL — HIGH (ref 150–400)
POTASSIUM SERPL-MCNC: 3.4 MMOL/L — LOW (ref 3.5–5.3)
POTASSIUM SERPL-SCNC: 3.4 MMOL/L — LOW (ref 3.5–5.3)
PROT UR-MCNC: 30 MG/DL
RBC # BLD: 2.86 M/UL — LOW (ref 4.2–5.8)
RBC # FLD: 21 % — HIGH (ref 10.3–14.5)
RBC CASTS # UR COMP ASSIST: ABNORMAL /HPF (ref 0–4)
SODIUM SERPL-SCNC: 149 MMOL/L — HIGH (ref 135–145)
SP GR SPEC: 1.01 — SIGNIFICANT CHANGE UP (ref 1.01–1.02)
UROBILINOGEN FLD QL: NEGATIVE MG/DL — SIGNIFICANT CHANGE UP
WBC # BLD: 19.43 K/UL — HIGH (ref 3.8–10.5)
WBC # FLD AUTO: 19.43 K/UL — HIGH (ref 3.8–10.5)
WBC UR QL: ABNORMAL

## 2019-05-30 PROCEDURE — 99233 SBSQ HOSP IP/OBS HIGH 50: CPT

## 2019-05-30 PROCEDURE — 99223 1ST HOSP IP/OBS HIGH 75: CPT

## 2019-05-30 PROCEDURE — 74176 CT ABD & PELVIS W/O CONTRAST: CPT | Mod: 26

## 2019-05-30 PROCEDURE — 93971 EXTREMITY STUDY: CPT | Mod: 26,RT

## 2019-05-30 RX ORDER — IOHEXOL 300 MG/ML
30 INJECTION, SOLUTION INTRAVENOUS ONCE
Refills: 0 | Status: COMPLETED | OUTPATIENT
Start: 2019-05-30 | End: 2019-05-30

## 2019-05-30 RX ORDER — POTASSIUM CHLORIDE 20 MEQ
40 PACKET (EA) ORAL ONCE
Refills: 0 | Status: COMPLETED | OUTPATIENT
Start: 2019-05-30 | End: 2019-05-30

## 2019-05-30 RX ORDER — MORPHINE SULFATE 50 MG/1
2 CAPSULE, EXTENDED RELEASE ORAL ONCE
Refills: 0 | Status: DISCONTINUED | OUTPATIENT
Start: 2019-05-30 | End: 2019-05-30

## 2019-05-30 RX ORDER — SODIUM CHLORIDE 9 MG/ML
1000 INJECTION, SOLUTION INTRAVENOUS
Refills: 0 | Status: DISCONTINUED | OUTPATIENT
Start: 2019-05-30 | End: 2019-05-31

## 2019-05-30 RX ADMIN — SODIUM CHLORIDE 75 MILLILITER(S): 9 INJECTION, SOLUTION INTRAVENOUS at 09:17

## 2019-05-30 RX ADMIN — MORPHINE SULFATE 2 MILLIGRAM(S): 50 CAPSULE, EXTENDED RELEASE ORAL at 08:32

## 2019-05-30 RX ADMIN — HEPARIN SODIUM 5000 UNIT(S): 5000 INJECTION INTRAVENOUS; SUBCUTANEOUS at 18:04

## 2019-05-30 RX ADMIN — MORPHINE SULFATE 2 MILLIGRAM(S): 50 CAPSULE, EXTENDED RELEASE ORAL at 08:17

## 2019-05-30 RX ADMIN — OXYCODONE AND ACETAMINOPHEN 2 TABLET(S): 5; 325 TABLET ORAL at 15:44

## 2019-05-30 RX ADMIN — CHLORHEXIDINE GLUCONATE 1 APPLICATION(S): 213 SOLUTION TOPICAL at 08:17

## 2019-05-30 RX ADMIN — Medication 1 GRAM(S): at 12:43

## 2019-05-30 RX ADMIN — Medication 100 MILLIGRAM(S): at 22:19

## 2019-05-30 RX ADMIN — PANTOPRAZOLE SODIUM 40 MILLIGRAM(S): 20 TABLET, DELAYED RELEASE ORAL at 05:15

## 2019-05-30 RX ADMIN — OXYCODONE AND ACETAMINOPHEN 2 TABLET(S): 5; 325 TABLET ORAL at 04:12

## 2019-05-30 RX ADMIN — Medication 100 MILLIGRAM(S): at 05:15

## 2019-05-30 RX ADMIN — Medication 1 GRAM(S): at 05:15

## 2019-05-30 RX ADMIN — Medication 40 MILLIEQUIVALENT(S): at 11:06

## 2019-05-30 RX ADMIN — NAFCILLIN 200 GRAM(S): 10 INJECTION, POWDER, FOR SOLUTION INTRAVENOUS at 22:19

## 2019-05-30 RX ADMIN — Medication 1 GRAM(S): at 18:05

## 2019-05-30 RX ADMIN — Medication 1 GRAM(S): at 00:03

## 2019-05-30 RX ADMIN — Medication 100 MILLIGRAM(S): at 14:49

## 2019-05-30 RX ADMIN — HEPARIN SODIUM 5000 UNIT(S): 5000 INJECTION INTRAVENOUS; SUBCUTANEOUS at 05:15

## 2019-05-30 RX ADMIN — OXYCODONE AND ACETAMINOPHEN 2 TABLET(S): 5; 325 TABLET ORAL at 22:20

## 2019-05-30 RX ADMIN — NAFCILLIN 200 GRAM(S): 10 INJECTION, POWDER, FOR SOLUTION INTRAVENOUS at 18:04

## 2019-05-30 RX ADMIN — OXYCODONE AND ACETAMINOPHEN 2 TABLET(S): 5; 325 TABLET ORAL at 00:03

## 2019-05-30 RX ADMIN — PANTOPRAZOLE SODIUM 40 MILLIGRAM(S): 20 TABLET, DELAYED RELEASE ORAL at 18:04

## 2019-05-30 RX ADMIN — OXYCODONE AND ACETAMINOPHEN 2 TABLET(S): 5; 325 TABLET ORAL at 16:44

## 2019-05-30 RX ADMIN — NAFCILLIN 200 GRAM(S): 10 INJECTION, POWDER, FOR SOLUTION INTRAVENOUS at 14:49

## 2019-05-30 RX ADMIN — SODIUM CHLORIDE 75 MILLILITER(S): 9 INJECTION, SOLUTION INTRAVENOUS at 22:19

## 2019-05-30 RX ADMIN — IOHEXOL 30 MILLILITER(S): 300 INJECTION, SOLUTION INTRAVENOUS at 08:42

## 2019-05-30 RX ADMIN — NAFCILLIN 200 GRAM(S): 10 INJECTION, POWDER, FOR SOLUTION INTRAVENOUS at 05:15

## 2019-05-30 RX ADMIN — OXYCODONE AND ACETAMINOPHEN 2 TABLET(S): 5; 325 TABLET ORAL at 00:50

## 2019-05-30 RX ADMIN — OXYCODONE AND ACETAMINOPHEN 2 TABLET(S): 5; 325 TABLET ORAL at 23:10

## 2019-05-30 RX ADMIN — NAFCILLIN 200 GRAM(S): 10 INJECTION, POWDER, FOR SOLUTION INTRAVENOUS at 10:09

## 2019-05-30 RX ADMIN — OXYCODONE AND ACETAMINOPHEN 2 TABLET(S): 5; 325 TABLET ORAL at 05:15

## 2019-05-30 RX ADMIN — NAFCILLIN 200 GRAM(S): 10 INJECTION, POWDER, FOR SOLUTION INTRAVENOUS at 01:42

## 2019-05-30 NOTE — PROGRESS NOTE ADULT - SUBJECTIVE AND OBJECTIVE BOX
Patient is a 58y old  Male who presents with a chief complaint of Abdominal pain, vomiting (30 May 2019 06:44)      INTERVAL HPI / OVERNIGHT EVENTS:spiting ,nausea? ,c/o abdo pain     MEDICATIONS  (STANDING):  chlorhexidine 4% Liquid 1 Application(s) Topical <User Schedule>  heparin  Injectable 5000 Unit(s) SubCutaneous every 12 hours  metroNIDAZOLE  IVPB 500 milliGRAM(s) IV Intermittent every 8 hours  nafcillin  IVPB 2 Gram(s) IV Intermittent every 4 hours  nafcillin  IVPB      pantoprazole  Injectable 40 milliGRAM(s) IV Push every 12 hours  sodium chloride 0.45%. 1000 milliLiter(s) (75 mL/Hr) IV Continuous <Continuous>  sucralfate 1 Gram(s) Oral every 6 hours    MEDICATIONS  (PRN):  ondansetron Injectable 4 milliGRAM(s) IV Push every 6 hours PRN Nausea and/or Vomiting  oxyCODONE    5 mG/acetaminophen 325 mG 2 Tablet(s) Oral every 4 hours PRN Moderate Pain (4 - 6)      Vital Signs Last 24 Hrs  T(C): 36.4 (30 May 2019 12:18), Max: 36.6 (29 May 2019 17:47)  T(F): 97.5 (30 May 2019 12:18), Max: 97.9 (29 May 2019 17:47)  HR: 86 (30 May 2019 12:18) (80 - 86)  BP: 170/89 (30 May 2019 12:18) (143/83 - 170/89)  BP(mean): --  RR: 17 (30 May 2019 12:18) (16 - 18)  SpO2: 100% (30 May 2019 12:18) (96% - 100%)    Review of systems:  General : no fever /chills,fatigue  CVS : no chest pain, palpitations  Lungs : no shortness of breath, cough  GI :+ abdominal pain, no vomiting, diarrhea   : no dysuria,hematuria        PHYSICAL EXAM:  General :NAD  Constitutional:  well-groomed, well-developed  Respiratory: CTAB/L  Cardiovascular: S1 and S2, RRR, no M/G/R  Gastrointestinal: BS+, soft, NT/ND, drains X 4 ,s/p ex lap   Extremities: No peripheral edema  Vascular: 2+ peripheral pulses  Skin: No rashes      LABS:                        8.4    19.43 )-----------( 789      ( 30 May 2019 08:33 )             26.2     05-30    149<H>  |  122<H>  |  17  ----------------------------<  91  3.4<L>   |  18<L>  |  2.33<H>    Ca    7.8<L>      30 May 2019 08:33  Phos  3.7     05-30  Mg     1.6     05-30            MICROBIOLOGY:  RECENT CULTURES:  05-28 .Blood XXXX XXXX   No growth to date.          RADIOLOGY & ADDITIONAL STUDIES:

## 2019-05-30 NOTE — PROGRESS NOTE ADULT - PROBLEM SELECTOR PLAN 1
sec to stomach perforation s/p yusfe patch repair   initial OR c/s and blood c/+ MSSA  on nafcillin for MSSA infection   ON genta (with level ) and flagyl for empiric coverage for gram neg and anaerobic coverage  cr worsening ,will hold off on any more genta  (received for 10 days with level )

## 2019-05-30 NOTE — PROGRESS NOTE ADULT - PROBLEM SELECTOR PLAN 2
sec to above  blood c/s positive for MSSA from 5/24 and on admission   ECHO neg for  IE  will need ISAIAS to r/o IE as positive for persistent Bacteremia (day 7 blood c/s positive )   repeat blood c/s ordered  cardiology on board: may do ISAIAS tomorrow   Repeat blood c/s neg now from 5/29   MRI spine : neg for diskitis/OM   cont nafcillin

## 2019-05-30 NOTE — PROGRESS NOTE ADULT - SUBJECTIVE AND OBJECTIVE BOX
SURGERY PROGRESS HPI:  Pt seen and examined at bedside. Pain is well controlled on pain medication. Pt denies complaints. No acute events overnight. Pt tolerating low residue diet. States that he did not eat too much yesterday because the food did not taste good, especially the chicken and rice. Pt denies nausea and vomiting.  +BM/flatus. Voiding well. Pt denies chest pain, SOB, dizziness, fever, chills.       Vital Signs Last 24 Hrs  T(C): 36.4 (30 May 2019 05:59), Max: 36.7 (29 May 2019 10:56)  T(F): 97.6 (30 May 2019 05:59), Max: 98 (29 May 2019 10:56)  HR: 86 (30 May 2019 05:59) (80 - 93)  BP: 143/83 (30 May 2019 05:59) (143/83 - 160/83)  BP(mean): --  RR: 18 (30 May 2019 05:59) (16 - 18)  SpO2: 99% (30 May 2019 05:59) (96% - 99%)      PHYSICAL EXAM:  General: NAD, WDWN.   Neuro:  Alert & responsive  HEENT: NCAT, EOMI, conjunctiva clear  CV: +S1+S2 regular rate and rhythm  Lung: clear to ausculation bilaterally, respirations nonlabored, good inspiratory effort  ABDOMEN: Midline wound clean and dry with staples intact, no drainage or bleeding. JPs intact with minimal drainage, all with serous output, +BS, soft, mild postop area tenderness  Extremities: no pedal edema or calf tenderness noted    I&O's Detail    28 May 2019 07:01  -  29 May 2019 07:00  --------------------------------------------------------  IN:    Oral Fluid: 240 mL    Solution: 300 mL    Solution: 200 mL  Total IN: 740 mL    OUT:    Voided: 600 mL  Total OUT: 600 mL    Total NET: 140 mL      29 May 2019 07:01  -  30 May 2019 06:44  --------------------------------------------------------  IN:    Oral Fluid: 380 mL    Solution: 300 mL    Solution: 200 mL  Total IN: 880 mL    OUT:    Drain: 20 mL    Drain: 15 mL    Voided: 400 mL  Total OUT: 435 mL    Total NET: 445 mL          LABS:                        7.9    20.39 )-----------( 709      ( 29 May 2019 06:22 )             24.0     05-29    146<H>  |  119<H>  |  17  ----------------------------<  106<H>  3.2<L>   |  19<L>  |  1.90<H>    Ca    7.5<L>      29 May 2019 06:22  Phos  3.2     05-29  Mg     1.6     05-29    Culture Results:   No growth to date. (05-28 @ 19:17)      MR Lumbar Spine No Cont (05.29.19 @ 16:06)   IMPRESSION:    Unremarkable study.    MR Thoracic Spine No Cont (05.29.19 @ 15:48)   IMPRESSION:        Unremarkable MRI study of the thoracic spine. Bilateral pleural   effusions. CT imaging of the chest recommended for further assessment.    MR Cervical Spine No Cont (05.29.19 @ 15:21)   IMPRESSION:       1. Chronic degenerative changes C3-C6, with multilevel sac effacement but   without stenosis or cord compression. Ankylosis at C6-7.  2. No acute fracture or destructive lesion.  3. No MR evidence of disc space infection or osteomyelitis.  4. Large bilateral pleural effusions.        Impression: 58M PMH ETOH abuse, pancreatitis, hep C a/w gastric perforation POD#14 s/p ex-lap, yusef patch repair, peritoneal lavage. Found to have retrogastric collection and/or lateral perihepatic/subcapsular collection drainage s/sp perc drainage 5/22  UGIS showed no leak or extravasation, NOW with Positive blood cultures    Plan:  - Repeat CT scan this AM. Will follow up creatinine  - Possible ISAIAS per cardiology  - cont drains to self suction, monitor output  - continue antibiotics per ID, trend WBC  - pain management  - cont. LRD as tolerated  - Continue with medical comanagement  - DVT ppx, GI ppx, incentive spirometer, increase activity with PT  - will d/w surgical attending

## 2019-05-30 NOTE — CONSULT NOTE ADULT - ASSESSMENT
57 yo M w/ history of gout, ETOH abuse, pancreatitis, hep C p/w YUNIEL & sepsis from gastric perforation and had ex-lap, yusef patch repair, peritoneal lavage on 5/16.     Gastric perforation  - status post ex-lap, yusef patch repair, peritoneal lavage on 5/16  - retrogastric collection and/or lateral perihepatic/subcapsular collection drainage s/sp perc drainage 5/22  - UGIS showed no leak or extravasation  - blood cultures grew MSSA  - care as per surgery  - on nafcillin and flagyl as per ID   - ISAIAS scheduled for Friday  - pain meds as per surgery    YUNIEL  - initially due to ATN, now renal suspects Gentamicin nephrotoxicity, AIN, septic ATN  - CT without hydro   - checking Gent level     Post operative anemia on chronic anemia  - received 3 units on 5/16    Prophylaxis:  DVT: heparin   GI: Carafate and Protonix 59 yo M w/ history of gout, ETOH abuse, chronic pancreatitis, hep C p/w YUNIEL & sepsis POA from gastric perforation and purulent peritonitis & had ex-lap, yusef patch repair, peritoneal lavage on 5/16.     Purulent peritonitis & gastric perforation w/ sepsis POA and peristent MSSA bacteremia  - leukocytosis improving  - retrogastric collection and/or lateral perihepatic/subcapsular collection drained by IR on 5/22 (250cc drained)  - OR & blood cultures grew MSSA, NGTD on repeat blood cx from 5/28  - on nafcillin and flagyl as per ID   - TTE on 5/21 showed EF 60-65% w/ no evidence of valvular vegetation   - ISAIAS scheduled for Friday  - MRI of thoracolumbar spine was unremarkable & did not show diskitis /OM, Cervical spine MRI only showed chronic degenerative changes of C3-C6, with multilevel sac effacement     Hypokalemia  - replete w/ KCl    gastric perforation status post ex-lap, yusef patch repair, peritoneal lavage on 5/16  - status post extubation on 5/17  - UGIS showed no leak or extravasation on 5/23  - transferred out of ICU on 5/23  - care as per surgery  - pain meds as per surgery    YUNIEL  - initially due to ATN, now renal suspects Gentamicin nephrotoxicity, AIN, septic ATN  - CT without hydro   - checking Gent level     Post operative anemia on chronic anemia  - received 3 units on 5/16    Prophylaxis:  DVT: heparin   GI: Carafate and Protonix

## 2019-05-30 NOTE — CONSULT NOTE ADULT - SUBJECTIVE AND OBJECTIVE BOX
HPI:  59 y/o male PMHx ETOH abuse, chronic pancreatitis, treated hepatitis C, gout c/o severe lower abdominal pain x 3 days. Pain worsened today. Pain is associated with nausea and multiple episodes of clear vomiting. Patient took 2 aspirins which did not relieve the pain. Last BM was 3 days ago. Last flatus was today. Patient denies fever, chills, constipation, diarrhea, hematuria, melena, hematochezia, chest pain, shortness of breath, dizziness. Patient denies prior incident. (16 May 2019 02:18)      PAST MEDICAL & SURGICAL HISTORY:  Gout  Hepatitis C: treated  Pancreatitis  ETOH abuse  No significant past surgical history      REVIEW OF SYSTEMS:    CONSTITUTIONAL: No fever, weight loss, or fatigue  EYES: No eye pain, visual disturbances, or discharge  ENMT:  No difficulty hearing, tinnitus, vertigo; No sinus or throat pain  NECK: No pain or stiffness  BREASTS: No pain, masses, or nipple discharge  RESPIRATORY: No cough, wheezing, chills or hemoptysis; No shortness of breath  CARDIOVASCULAR: No chest pain, palpitations, dizziness, or leg swelling  GASTROINTESTINAL: No abdominal or epigastric pain. No nausea, vomiting, or hematemesis; No diarrhea or constipation. No melena or hematochezia.  GENITOURINARY: No dysuria, frequency, hematuria, or incontinence  NEUROLOGICAL: No headaches, memory loss, loss of strength, numbness, or tremors  SKIN: No itching, burning, rashes, or lesions   LYMPH NODES: No enlarged glands  ENDOCRINE: No heat or cold intolerance; No hair loss  MUSCULOSKELETAL: No joint pain or swelling; No muscle, back, or extremity pain  PSYCHIATRIC: No depression, anxiety, mood swings, or difficulty sleeping  HEME/LYMPH: No easy bruising, or bleeding gums  ALLERGY AND IMMUNOLOGIC: No hives or eczema      MEDICATIONS  (STANDING):  chlorhexidine 4% Liquid 1 Application(s) Topical <User Schedule>  heparin  Injectable 5000 Unit(s) SubCutaneous every 12 hours  metroNIDAZOLE  IVPB 500 milliGRAM(s) IV Intermittent every 8 hours  nafcillin  IVPB 2 Gram(s) IV Intermittent every 4 hours  nafcillin  IVPB      pantoprazole  Injectable 40 milliGRAM(s) IV Push every 12 hours  sodium chloride 0.45%. 1000 milliLiter(s) (75 mL/Hr) IV Continuous <Continuous>  sucralfate 1 Gram(s) Oral every 6 hours    MEDICATIONS  (PRN):  ondansetron Injectable 4 milliGRAM(s) IV Push every 6 hours PRN Nausea and/or Vomiting  oxyCODONE    5 mG/acetaminophen 325 mG 2 Tablet(s) Oral every 4 hours PRN Moderate Pain (4 - 6)      Allergies    No Known Allergies    Intolerances        SOCIAL HISTORY:    FAMILY HISTORY:  No pertinent family history in first degree relatives      Vital Signs Last 24 Hrs  T(C): 36.1 (30 May 2019 18:00), Max: 36.6 (29 May 2019 23:51)  T(F): 97 (30 May 2019 18:00), Max: 97.9 (29 May 2019 23:51)  HR: 81 (30 May 2019 18:00) (81 - 86)  BP: 159/82 (30 May 2019 18:00) (143/83 - 170/89)  BP(mean): --  RR: 20 (30 May 2019 18:00) (17 - 20)  SpO2: 92% (30 May 2019 18:00) (92% - 100%)    PHYSICAL EXAM:    GENERAL: NAD, well-groomed, well-developed  HEAD:  Atraumatic, Normocephalic  EYES: EOMI, PERRLA, conjunctiva and sclera clear  ENMT: No tonsillar erythema, exudates, or enlargement; Moist mucous membranes, Good dentition, No lesions  NECK: Supple, No JVD, Normal thyroid  NERVOUS SYSTEM:  Alert & Oriented X3, Good concentration; Motor Strength 5/5 B/L upper and lower extremities; DTRs 2+ intact and symmetric  CHEST/LUNG: Clear to percussion bilaterally; No rales, rhonchi, wheezing, or rubs  HEART: Regular rate and rhythm; No murmurs, rubs, or gallops  ABDOMEN: Soft, Nontender, Nondistended; Bowel sounds present  EXTREMITIES:  2+ Peripheral Pulses, No clubbing, cyanosis, or edema  LYMPH: No lymphadenopathy notedRECTAL: No masses, prostate normal size and smooth, Guiac negative   BREAST: No palpatble masses, skin no lesions no retractions, no discharages. adenexa no palpable masses noted   GYN: uterus normal size, adenexa no palpable masses, no CMT, no uterine discharge   SKIN: No rashes or lesions      LABS:                        8.4    19.43 )-----------( 789      ( 30 May 2019 08:33 )             26.2     05-30    149<H>  |  122<H>  |  17  ----------------------------<  91  3.4<L>   |  18<L>  |  2.33<H>    Ca    7.8<L>      30 May 2019 08:33  Phos  3.7     05-30  Mg     1.6     05-30        Urinalysis Basic - ( 30 May 2019 21:07 )    Color: Yellow / Appearance: Clear / S.010 / pH: x  Gluc: x / Ketone: Negative  / Bili: Negative / Urobili: Negative mg/dL   Blood: x / Protein: 30 mg/dL / Nitrite: Negative   Leuk Esterase: Trace / RBC: x / WBC x   Sq Epi: x / Non Sq Epi: x / Bacteria: x        Culture - Blood (collected 28 May 2019 19:17)  Source: .Blood  Preliminary Report (29 May 2019 20:01):    No growth to date.      RADIOLOGY & ADDITIONAL STUDIES: HPI:  57 y/o male PMHx ETOH abuse, chronic pancreatitis, treated hepatitis C, gout c/o severe lower abdominal pain x 3 days. Pain worsened today. Pain is associated with nausea and multiple episodes of clear vomiting. Patient took 2 aspirins which did not relieve the pain. Last BM was 3 days ago. Last flatus was today. Patient denies fever, chills, constipation, diarrhea, hematuria, melena, hematochezia, chest pain, shortness of breath, dizziness. Patient denies prior incident. (16 May 2019 02:18)      PAST MEDICAL & SURGICAL HISTORY:  Gout  Hepatitis C: treated  Pancreatitis  ETOH abuse  No significant past surgical history      REVIEW OF SYSTEMS:    CONSTITUTIONAL: No fever, weight loss, or fatigue  EYES: No eye pain, visual disturbances, or discharge  ENMT:  No difficulty hearing, tinnitus, vertigo; No sinus or throat pain  NECK: No pain or stiffness  BREASTS: No pain, masses, or nipple discharge  RESPIRATORY: No cough, wheezing, chills or hemoptysis; No shortness of breath  CARDIOVASCULAR: No chest pain, palpitations, dizziness, or leg swelling  GASTROINTESTINAL: No abdominal or epigastric pain. No nausea, vomiting, or hematemesis; No diarrhea or constipation. No melena or hematochezia.  GENITOURINARY: No dysuria, frequency, hematuria, or incontinence  NEUROLOGICAL: No headaches, memory loss, loss of strength, numbness, or tremors  SKIN: No itching, burning, rashes, or lesions   LYMPH NODES: No enlarged glands  ENDOCRINE: No heat or cold intolerance; No hair loss  MUSCULOSKELETAL: No joint pain or swelling; No muscle, back, or extremity pain  PSYCHIATRIC: No depression, anxiety, mood swings, or difficulty sleeping  HEME/LYMPH: No easy bruising, or bleeding gums  ALLERGY AND IMMUNOLOGIC: No hives or eczema      MEDICATIONS  (STANDING):  chlorhexidine 4% Liquid 1 Application(s) Topical <User Schedule>  heparin  Injectable 5000 Unit(s) SubCutaneous every 12 hours  metroNIDAZOLE  IVPB 500 milliGRAM(s) IV Intermittent every 8 hours  nafcillin  IVPB 2 Gram(s) IV Intermittent every 4 hours  nafcillin  IVPB      pantoprazole  Injectable 40 milliGRAM(s) IV Push every 12 hours  sodium chloride 0.45%. 1000 milliLiter(s) (75 mL/Hr) IV Continuous <Continuous>  sucralfate 1 Gram(s) Oral every 6 hours    MEDICATIONS  (PRN):  ondansetron Injectable 4 milliGRAM(s) IV Push every 6 hours PRN Nausea and/or Vomiting  oxyCODONE    5 mG/acetaminophen 325 mG 2 Tablet(s) Oral every 4 hours PRN Moderate Pain (4 - 6)      Allergies    No Known Allergies    Intolerances        SOCIAL HISTORY:    FAMILY HISTORY:  No pertinent family history in first degree relatives      Vital Signs Last 24 Hrs  T(C): 36.1 (30 May 2019 18:00), Max: 36.6 (29 May 2019 23:51)  T(F): 97 (30 May 2019 18:00), Max: 97.9 (29 May 2019 23:51)  HR: 81 (30 May 2019 18:00) (81 - 86)  BP: 159/82 (30 May 2019 18:00) (143/83 - 170/89)  BP(mean): --  RR: 20 (30 May 2019 18:00) (17 - 20)  SpO2: 92% (30 May 2019 18:00) (92% - 100%)    PHYSICAL EXAM:    GENERAL: NAD, well-groomed, well-developed  HEAD:  Atraumatic, Normocephalic  EYES: EOMI, PERRLA, conjunctiva and sclera clear  ENMT: No tonsillar erythema, exudates, or enlargement; Moist mucous membranes, Good dentition, No lesions  NECK: Supple, No JVD, Normal thyroid  NERVOUS SYSTEM:  Alert & Oriented X3, Good concentration; Motor Strength 5/5 B/L upper and lower extremities; DTRs 2+ intact and symmetric  CHEST/LUNG: Clear to percussion bilaterally; No rales, rhonchi, wheezing, or rubs  HEART: Regular rate and rhythm; No murmurs, rubs, or gallops  ABDOMEN: Soft, Nontender, Nondistended; Bowel sounds present  EXTREMITIES:  2+ Peripheral Pulses, No clubbing, cyanosis, or edema  LYMPH: No lymphadenopathy notedRECTAL: No masses, prostate normal size and smooth, Guiac negative   BREAST: No palpatble masses, skin no lesions no retractions, no discharages. adenexa no palpable masses noted   GYN: uterus normal size, adenexa no palpable masses, no CMT, no uterine discharge   SKIN: No rashes or lesions      LABS:                        8.4    19.43 )-----------( 789      ( 30 May 2019 08:33 )             26.2     05-30    149<H>  |  122<H>  |  17  ----------------------------<  91  3.4<L>   |  18<L>  |  2.33<H>    Ca    7.8<L>      30 May 2019 08:33  Phos  3.7     05-30  Mg     1.6     05-30        Urinalysis Basic - ( 30 May 2019 21:07 )    Color: Yellow / Appearance: Clear / S.010 / pH: x  Gluc: x / Ketone: Negative  / Bili: Negative / Urobili: Negative mg/dL   Blood: x / Protein: 30 mg/dL / Nitrite: Negative   Leuk Esterase: Trace / RBC: x / WBC x   Sq Epi: x / Non Sq Epi: x / Bacteria: x        Culture - Blood (collected 28 May 2019 19:17)  Source: .Blood  Preliminary Report (29 May 2019 20:01):    No growth to date. 57 yo M w/ history of gout, ETOH abuse, chronic pancreatitis, hep C p/w YNUIEL & sepsis POA from gastric perforation and purulent peritonitis & had ex-lap, yusef patch repair, peritoneal lavage on . He was subsequently extubated and had a drainage placed by IR for retrogastric collection and/or lateral perihepatic/subcapsular collection on . Pt's hospital course has been complicated by persistent MSSA bacteremia. He is lying in bed in [facility].       PAST MEDICAL & SURGICAL HISTORY:  Gout  Hepatitis C: treated  Pancreatitis  ETOH abuse  No significant past surgical history      REVIEW OF SYSTEMS:    CONSTITUTIONAL: No fever, weight loss, or fatigue  EYES: No eye pain, visual disturbances, or discharge  ENMT:  No difficulty hearing, tinnitus, vertigo; No sinus or throat pain  NECK: No pain or stiffness  RESPIRATORY:  recently intubated but reports no dyspnea  CARDIOVASCULAR: No chest pain, palpitations, dizziness, or leg swelling  GASTROINTESTINAL: abdominal  pain   GENITOURINARY: No dysuria, frequency, hematuria, or incontinence  NEUROLOGICAL: No headaches, memory loss, loss of strength, numbness, or tremors  SKIN: No itching, burning, rashes, or lesions   LYMPH NODES: No enlarged glands  ENDOCRINE: No heat or cold intolerance; No hair loss  MUSCULOSKELETAL: back pain  PSYCHIATRIC: No depression, anxiety, mood swings, or difficulty sleeping  HEME/LYMPH: No easy bruising, or bleeding gums  ALLERGY AND IMMUNOLOGIC: No hives or eczema      MEDICATIONS  (STANDING):  chlorhexidine 4% Liquid 1 Application(s) Topical <User Schedule>  heparin  Injectable 5000 Unit(s) SubCutaneous every 12 hours  metroNIDAZOLE  IVPB 500 milliGRAM(s) IV Intermittent every 8 hours  nafcillin  IVPB 2 Gram(s) IV Intermittent every 4 hours  nafcillin  IVPB      pantoprazole  Injectable 40 milliGRAM(s) IV Push every 12 hours  sodium chloride 0.45%. 1000 milliLiter(s) (75 mL/Hr) IV Continuous <Continuous>  sucralfate 1 Gram(s) Oral every 6 hours    MEDICATIONS  (PRN):  ondansetron Injectable 4 milliGRAM(s) IV Push every 6 hours PRN Nausea and/or Vomiting  oxyCODONE    5 mG/acetaminophen 325 mG 2 Tablet(s) Oral every 4 hours PRN Moderate Pain (4 - 6)      Allergies    No Known Allergies    Intolerances        SOCIAL HISTORY:    FAMILY HISTORY:  No pertinent family history in first degree relatives      Vital Signs Last 24 Hrs  T(C): 36.1 (30 May 2019 18:00), Max: 36.6 (29 May 2019 23:51)  T(F): 97 (30 May 2019 18:00), Max: 97.9 (29 May 2019 23:51)  HR: 81 (30 May 2019 18:00) (81 - 86)  BP: 159/82 (30 May 2019 18:00) (143/83 - 170/89)  BP(mean): --  RR: 20 (30 May 2019 18:00) (17 - 20)  SpO2: 92% (30 May 2019 18:00) (92% - 100%)    PHYSICAL EXAM:    GENERAL: NAD, well-groomed, well-developed  HEAD:  Atraumatic, Normocephalic  EYES: EOMI, PERRLA   NECK: Supple   NERVOUS SYSTEM: Alert    CHEST/LUNG: Clear to percussion bilaterally; No rales, rhonchi, wheezing, or rubs  HEART: Regular rate and rhythm; No murmurs, rubs, or gallops  ABDOMEN: Soft, Nontender, Nondistended; Bowel sounds present  EXTREMITIES: B/l LE and RUE edema    LABS:                        8.4    19.43 )-----------( 789      ( 30 May 2019 08:33 )             26.2     05-30    149<H>  |  122<H>  |  17  ----------------------------<  91  3.4<L>   |  18<L>  |  2.33<H>    Ca    7.8<L>      30 May 2019 08:33  Phos  3.7     05-30  Mg     1.6     05-30        Urinalysis Basic - ( 30 May 2019 21:07 )    Color: Yellow / Appearance: Clear / S.010 / pH: x  Gluc: x / Ketone: Negative  / Bili: Negative / Urobili: Negative mg/dL   Blood: x / Protein: 30 mg/dL / Nitrite: Negative   Leuk Esterase: Trace / RBC: x / WBC x   Sq Epi: x / Non Sq Epi: x / Bacteria: x        Culture - Blood (collected 28 May 2019 19:17)  Source: .Blood  Preliminary Report (29 May 2019 20:01):    No growth to date.

## 2019-05-30 NOTE — CONSULT NOTE ADULT - SUBJECTIVE AND OBJECTIVE BOX
HPI:    Patient is a 58y old  Male admitted on  with perforated duodenal ulcer requiring David patch repair. Hospitalization complicated by peritonitis, retrogastric collection requiring   percutaneous drainage. Clinically improved. His Cr had improved from admission value of 4.08 to 1.68 on . However, noted progressive rise in serum Cr since.   He has received high dose IV Gentamicin q48h every other day since .   Gent T was 2.6 on  and has not been checked since. No other nephrotoxins on record.   BP has been stable.           PAST MEDICAL & SURGICAL HISTORY:  Gout  Hepatitis C: treated  Pancreatitis  ETOH abuse  No significant past surgical history      Social Hx: ETOH abuse    FAMILY HISTORY:  No pertinent family history in first degree relatives      Allergies    No Known Allergies          MEDICATIONS  (STANDING):  chlorhexidine 4% Liquid 1 Application(s) Topical <User Schedule>  heparin  Injectable 5000 Unit(s) SubCutaneous every 12 hours  metroNIDAZOLE  IVPB 500 milliGRAM(s) IV Intermittent every 8 hours  nafcillin  IVPB 2 Gram(s) IV Intermittent every 4 hours  nafcillin  IVPB      pantoprazole  Injectable 40 milliGRAM(s) IV Push every 12 hours  sodium chloride 0.45%. 1000 milliLiter(s) (75 mL/Hr) IV Continuous <Continuous>  sucralfate 1 Gram(s) Oral every 6 hours    MEDICATIONS  (PRN):  ondansetron Injectable 4 milliGRAM(s) IV Push every 6 hours PRN Nausea and/or Vomiting  oxyCODONE    5 mG/acetaminophen 325 mG 2 Tablet(s) Oral every 4 hours PRN Moderate Pain (4 - 6)      Daily     Daily Weight in k.8 (30 May 2019 05:59)    Drug Dosing Weight  Height (cm): 185.42 (17 May 2019 11:00)  Weight (kg): 62.7 (16 May 2019 07:00)  BMI (kg/m2): 18.2 (17 May 2019 11:00)  BSA (m2): 1.84 (17 May 2019 11:00)      REVIEW OF SYSTEMS:  Per HPI    I&O's Detail    29 May 2019 07:01  -  30 May 2019 07:00  --------------------------------------------------------  IN:    Oral Fluid: 380 mL    Solution: 300 mL    Solution: 200 mL  Total IN: 880 mL    OUT:    Drain: 20 mL    Drain: 15 mL    Voided: 400 mL  Total OUT: 435 mL    Total NET: 445 mL           @ 07:01  -   @ 07:00  --------------------------------------------------------  IN: 880 mL / OUT: 435 mL / NET: 445 mL        PHYSICAL EXAM:    GENERAL: NAD  HEAD:  Atraumatic, Normocephalic  EYES: EOMI, PERRLA, conjunctiva and sclera clear  ENMT: No tonsillar erythema, exudates, or enlargement; Moist mucous membranes, Good dentition, No lesions  NECK: Supple, No JVD, Normal thyroid  NERVOUS SYSTEM:  Alert & Oriented X3, Good concentration; Motor Strength 5/5 B/L upper and lower extremities; DTRs 2+ intact and symmetric  CHEST/LUNG: Clear to percussion bilaterally; No rales, rhonchi, wheezing, or rubs  HEART: Regular rate and rhythm; No murmurs, rubs, or gallops  ABDOMEN: Soft, Nontender, midline scar. RUQ drain.   EXTREMITIES:  pos edema  LYMPH: No lymphadenopathy noted  SKIN: No rashes or lesions    LABS:  CBC Full  -  ( 30 May 2019 08:33 )  WBC Count : 19.43 K/uL  RBC Count : 2.86 M/uL  Hemoglobin : 8.4 g/dL  Hematocrit : 26.2 %  Platelet Count - Automated : 789 K/uL  Mean Cell Volume : 91.6 fl  Mean Cell Hemoglobin : 29.4 pg  Mean Cell Hemoglobin Concentration : 32.1 gm/dL  Auto Neutrophil # : x  Auto Lymphocyte # : x  Auto Monocyte # : x  Auto Eosinophil # : x  Auto Basophil # : x  Auto Neutrophil % : x  Auto Lymphocyte % : x  Auto Monocyte % : x  Auto Eosinophil % : x  Auto Basophil % : x        149<H>  |  122<H>  |  17  ----------------------------<  91  3.4<L>   |  18<L>  |  2.33<H>    Ca    7.8<L>      30 May 2019 08:33  Phos  3.7     05-30  Mg     1.6     05-30      CAPILLARY BLOOD GLUCOSE      POCT Blood Glucose.: 94 mg/dL (30 May 2019 15:51)            Culture Results:   No growth to date. ( @ 19:17)        Impression:  * YUNIEL -- initially due to ATN. Now with a new episode of Cr rise. CT without hydro.  DDx: Gentamicin nephrotoxicity, AIN, septic ATN.   * Perforated viscus complicated by peritonitis, intra-abd collection  * S/p David patch repair  * MSSA bacteremia  Recommendations:   * Check random Gent level  * Repeat UA, FeNa, urine Justin's stain  * Avoid nephrotoxins  * Follow repeat Cr in 24h

## 2019-05-30 NOTE — PROGRESS NOTE ADULT - SUBJECTIVE AND OBJECTIVE BOX
57 y/o male PMHx ETOH abuse, chronic pancreatitis, treated hepatitis C, gout c/o severe lower abdominal pain x 3 days. Pain worsened today. Pain is associated with nausea and multiple episodes of clear vomiting. Patient took 2 aspirins which did not relieve the pain. Last BM was 3 days ago. Last flatus was today. Patient denies fever, chills, constipation, diarrhea, hematuria, melena, hematochezia, chest pain, shortness of breath, dizziness. Patient denies prior incident. (16 May 2019 02:18)      Chief Complaint:  Patient is a 58y old  Male who presents with a chief complaint of Abdominal pain, vomiting (28 May 2019 11:54)      Review of Systems:    General:  No wt loss, fevers, chills, night sweats  Eyes:  Good vision, no reported pain  ENT:  No sore throat, pain, runny nose, dysphagia  CV:  No pain, palpitations, hypo/hypertension  Resp:  No dyspnea, cough, tachypnea, wheezing           Social History/Family History  SOCHX:   tobacco,  -  alcohol    FMHX: FA/MO  - contributory       Discussed with:  PMD, Family    Physical Exam:    Vital Signs:  Vital Signs Last 24 Hrs  T(C): 37.4 (28 May 2019 11:34), Max: 37.4 (28 May 2019 11:34)  T(F): 99.3 (28 May 2019 11:34), Max: 99.3 (28 May 2019 11:34)  HR: 91 (28 May 2019 11:34) (78 - 98)  BP: 148/83 (28 May 2019 11:34) (132/73 - 171/70)  BP(mean): --  RR: 17 (28 May 2019 11:34) (16 - 17)  SpO2: 97% (28 May 2019 11:34) (95% - 97%)  Daily     Daily Weight in k.6 (28 May 2019 05:26)  I&O's Summary    27 May 2019 07:01  -  28 May 2019 07:00  --------------------------------------------------------  IN: 850 mL / OUT: 1112 mL / NET: -262 mL       conjunctivae clear, no thyromegaly, nodules, adenopathy, no JVD  Chest:  Full & symmetric excursion, no increased effort, breath sounds clear  Cardiovascular:  Regular rhythm, S1, S2, no murmur/rub/S3/S4, no carotid/femoral/abdominal bruit, radial/pedal pulses 2+,  edema  Abdomen:  Soft, non-tender, non-distended, normoactive bowel sounds, no HSM      Laboratory:                          7.3    22.75 )-----------( 645      ( 28 May 2019 06:57 )             22.3     05-28    150<H>  |  121<H>  |  18  ----------------------------<  83  3.5   |  20<L>  |  1.90<H>    Ca    7.6<L>      28 May 2019 06:57  Phos  3.7     -  Mg     1.5     -            CAPILLARY BLOOD GLUCOSE      POCT Blood Glucose.: 82 mg/dL (28 May 2019 10:41)  POCT Blood Glucose.: 91 mg/dL (28 May 2019 07:19)  POCT Blood Glucose.: 73 mg/dL (27 May 2019 22:11)  POCT Blood Glucose.: 77 mg/dL (27 May 2019 15:41)            Assessment:  Bacteremia  Long history and hospital course noted and reviewed  ID noted  TTE noted  Last BC now negative, 5 days  Remains  afebrile  ISAIAS scheduled for Friday 130Pm

## 2019-05-31 LAB
ALBUMIN SERPL ELPH-MCNC: 0.7 G/DL — LOW (ref 3.3–5)
ALP SERPL-CCNC: 212 U/L — HIGH (ref 40–120)
ALT FLD-CCNC: 7 U/L — LOW (ref 12–78)
ANION GAP SERPL CALC-SCNC: 9 MMOL/L — SIGNIFICANT CHANGE UP (ref 5–17)
AST SERPL-CCNC: 16 U/L — SIGNIFICANT CHANGE UP (ref 15–37)
BILIRUB SERPL-MCNC: 0.9 MG/DL — SIGNIFICANT CHANGE UP (ref 0.2–1.2)
BUN SERPL-MCNC: 16 MG/DL — SIGNIFICANT CHANGE UP (ref 7–23)
CALCIUM SERPL-MCNC: 7.3 MG/DL — LOW (ref 8.5–10.1)
CHLORIDE SERPL-SCNC: 120 MMOL/L — HIGH (ref 96–108)
CO2 SERPL-SCNC: 17 MMOL/L — LOW (ref 22–31)
CREAT SERPL-MCNC: 2.45 MG/DL — HIGH (ref 0.5–1.3)
EOSINOPHIL NFR URNS MANUAL: NEGATIVE — SIGNIFICANT CHANGE UP
GENTAMICIN SERPL-MCNC: 3.7 UG/ML — SIGNIFICANT CHANGE UP
GLUCOSE SERPL-MCNC: 85 MG/DL — SIGNIFICANT CHANGE UP (ref 70–99)
HCT VFR BLD CALC: 22.1 % — LOW (ref 39–50)
HGB BLD-MCNC: 7.2 G/DL — LOW (ref 13–17)
MAGNESIUM SERPL-MCNC: 1.5 MG/DL — LOW (ref 1.6–2.6)
MCHC RBC-ENTMCNC: 30.4 PG — SIGNIFICANT CHANGE UP (ref 27–34)
MCHC RBC-ENTMCNC: 32.6 GM/DL — SIGNIFICANT CHANGE UP (ref 32–36)
MCV RBC AUTO: 93.2 FL — SIGNIFICANT CHANGE UP (ref 80–100)
NRBC # BLD: 0 /100 WBCS — SIGNIFICANT CHANGE UP (ref 0–0)
PHOSPHATE SERPL-MCNC: 3.7 MG/DL — SIGNIFICANT CHANGE UP (ref 2.5–4.5)
PLATELET # BLD AUTO: 659 K/UL — HIGH (ref 150–400)
POTASSIUM SERPL-MCNC: 3.7 MMOL/L — SIGNIFICANT CHANGE UP (ref 3.5–5.3)
POTASSIUM SERPL-SCNC: 3.7 MMOL/L — SIGNIFICANT CHANGE UP (ref 3.5–5.3)
PROT SERPL-MCNC: 5 GM/DL — LOW (ref 6–8.3)
RBC # BLD: 2.37 M/UL — LOW (ref 4.2–5.8)
RBC # FLD: 21.1 % — HIGH (ref 10.3–14.5)
SODIUM SERPL-SCNC: 146 MMOL/L — HIGH (ref 135–145)
SODIUM UR-SCNC: 119 MMOL/L — SIGNIFICANT CHANGE UP
WBC # BLD: 16.55 K/UL — HIGH (ref 3.8–10.5)
WBC # FLD AUTO: 16.55 K/UL — HIGH (ref 3.8–10.5)

## 2019-05-31 PROCEDURE — 99233 SBSQ HOSP IP/OBS HIGH 50: CPT

## 2019-05-31 RX ORDER — MAGNESIUM SULFATE 500 MG/ML
2 VIAL (ML) INJECTION EVERY 4 HOURS
Refills: 0 | Status: COMPLETED | OUTPATIENT
Start: 2019-05-31 | End: 2019-06-01

## 2019-05-31 RX ORDER — SODIUM CHLORIDE 9 MG/ML
1000 INJECTION, SOLUTION INTRAVENOUS
Refills: 0 | Status: DISCONTINUED | OUTPATIENT
Start: 2019-05-31 | End: 2019-06-01

## 2019-05-31 RX ORDER — MORPHINE SULFATE 50 MG/1
1 CAPSULE, EXTENDED RELEASE ORAL ONCE
Refills: 0 | Status: DISCONTINUED | OUTPATIENT
Start: 2019-05-31 | End: 2019-05-31

## 2019-05-31 RX ADMIN — Medication 100 MILLIGRAM(S): at 05:20

## 2019-05-31 RX ADMIN — NAFCILLIN 200 GRAM(S): 10 INJECTION, POWDER, FOR SOLUTION INTRAVENOUS at 21:24

## 2019-05-31 RX ADMIN — NAFCILLIN 200 GRAM(S): 10 INJECTION, POWDER, FOR SOLUTION INTRAVENOUS at 17:19

## 2019-05-31 RX ADMIN — NAFCILLIN 200 GRAM(S): 10 INJECTION, POWDER, FOR SOLUTION INTRAVENOUS at 05:20

## 2019-05-31 RX ADMIN — Medication 1 GRAM(S): at 23:04

## 2019-05-31 RX ADMIN — OXYCODONE AND ACETAMINOPHEN 2 TABLET(S): 5; 325 TABLET ORAL at 13:00

## 2019-05-31 RX ADMIN — Medication 1 GRAM(S): at 05:21

## 2019-05-31 RX ADMIN — Medication 1 GRAM(S): at 17:18

## 2019-05-31 RX ADMIN — PANTOPRAZOLE SODIUM 40 MILLIGRAM(S): 20 TABLET, DELAYED RELEASE ORAL at 17:18

## 2019-05-31 RX ADMIN — Medication 50 GRAM(S): at 21:24

## 2019-05-31 RX ADMIN — HEPARIN SODIUM 5000 UNIT(S): 5000 INJECTION INTRAVENOUS; SUBCUTANEOUS at 17:19

## 2019-05-31 RX ADMIN — OXYCODONE AND ACETAMINOPHEN 2 TABLET(S): 5; 325 TABLET ORAL at 23:08

## 2019-05-31 RX ADMIN — Medication 100 MILLIGRAM(S): at 13:34

## 2019-05-31 RX ADMIN — MORPHINE SULFATE 1 MILLIGRAM(S): 50 CAPSULE, EXTENDED RELEASE ORAL at 17:15

## 2019-05-31 RX ADMIN — Medication 100 MILLIGRAM(S): at 21:24

## 2019-05-31 RX ADMIN — NAFCILLIN 200 GRAM(S): 10 INJECTION, POWDER, FOR SOLUTION INTRAVENOUS at 01:43

## 2019-05-31 RX ADMIN — NAFCILLIN 200 GRAM(S): 10 INJECTION, POWDER, FOR SOLUTION INTRAVENOUS at 09:05

## 2019-05-31 RX ADMIN — SODIUM CHLORIDE 75 MILLILITER(S): 9 INJECTION, SOLUTION INTRAVENOUS at 09:34

## 2019-05-31 RX ADMIN — NAFCILLIN 200 GRAM(S): 10 INJECTION, POWDER, FOR SOLUTION INTRAVENOUS at 13:17

## 2019-05-31 RX ADMIN — CHLORHEXIDINE GLUCONATE 1 APPLICATION(S): 213 SOLUTION TOPICAL at 07:24

## 2019-05-31 RX ADMIN — OXYCODONE AND ACETAMINOPHEN 2 TABLET(S): 5; 325 TABLET ORAL at 12:25

## 2019-05-31 RX ADMIN — OXYCODONE AND ACETAMINOPHEN 2 TABLET(S): 5; 325 TABLET ORAL at 06:30

## 2019-05-31 RX ADMIN — MORPHINE SULFATE 1 MILLIGRAM(S): 50 CAPSULE, EXTENDED RELEASE ORAL at 16:38

## 2019-05-31 RX ADMIN — OXYCODONE AND ACETAMINOPHEN 2 TABLET(S): 5; 325 TABLET ORAL at 23:55

## 2019-05-31 RX ADMIN — Medication 1 GRAM(S): at 00:06

## 2019-05-31 RX ADMIN — PANTOPRAZOLE SODIUM 40 MILLIGRAM(S): 20 TABLET, DELAYED RELEASE ORAL at 05:21

## 2019-05-31 RX ADMIN — OXYCODONE AND ACETAMINOPHEN 2 TABLET(S): 5; 325 TABLET ORAL at 05:30

## 2019-05-31 NOTE — PROGRESS NOTE ADULT - SUBJECTIVE AND OBJECTIVE BOX
Subjective: min abd pain. Genta level 3.7. Last Gent dose       MEDICATIONS  (STANDING):  chlorhexidine 4% Liquid 1 Application(s) Topical <User Schedule>  dextrose 5% + sodium chloride 0.45%. 1000 milliLiter(s) (75 mL/Hr) IV Continuous <Continuous>  heparin  Injectable 5000 Unit(s) SubCutaneous every 12 hours  metroNIDAZOLE  IVPB 500 milliGRAM(s) IV Intermittent every 8 hours  nafcillin  IVPB 2 Gram(s) IV Intermittent every 4 hours  nafcillin  IVPB      pantoprazole  Injectable 40 milliGRAM(s) IV Push every 12 hours  sucralfate 1 Gram(s) Oral every 6 hours    MEDICATIONS  (PRN):  ondansetron Injectable 4 milliGRAM(s) IV Push every 6 hours PRN Nausea and/or Vomiting  oxyCODONE    5 mG/acetaminophen 325 mG 2 Tablet(s) Oral every 4 hours PRN Moderate Pain (4 - 6)          T(C): 36.8 (19 @ 11:07), Max: 36.8 (19 @ 11:07)  HR: 88 (19 @ 11:07) (81 - 92)  BP: 131/82 (19 @ 11:07) (131/82 - 166/82)  RR: 19 (19 @ 11:07) (18 - 20)  SpO2: 97% (19 @ 11:07) (92% - 98%)  Wt(kg): --        I&O's Detail    30 May 2019 07:01  -  31 May 2019 07:00  --------------------------------------------------------  IN:    Oral Fluid: 240 mL    sodium chloride 0.45%: 1725 mL    Solution: 100 mL    Solution: 100 mL  Total IN: 2165 mL    OUT:    Drain: 2.5 mL    Voided: 400 mL  Total OUT: 402.5 mL    Total NET: 1762.5 mL               PHYSICAL EXAM:    GENERAL: comfortable.   EYES: EOMI, PERRLA, conjunctiva and sclera clear  NECK: Supple, no inc in JVP  CHEST/LUNG: Clear  HEART: S1S2  ABDOMEN: Soft, midline scar post recent Sx. RUQ drain.   EXTREMITIES:  trace edema  NEURO: no asterixis      LABS:  CBC Full  -  ( 31 May 2019 08:37 )  WBC Count : 16.55 K/uL  RBC Count : 2.37 M/uL  Hemoglobin : 7.2 g/dL  Hematocrit : 22.1 %  Platelet Count - Automated : 659 K/uL  Mean Cell Volume : 93.2 fl  Mean Cell Hemoglobin : 30.4 pg  Mean Cell Hemoglobin Concentration : 32.6 gm/dL  Auto Neutrophil # : x  Auto Lymphocyte # : x  Auto Monocyte # : x  Auto Eosinophil # : x  Auto Basophil # : x  Auto Neutrophil % : x  Auto Lymphocyte % : x  Auto Monocyte % : x  Auto Eosinophil % : x  Auto Basophil % : x        146<H>  |  120<H>  |  16  ----------------------------<  85  3.7   |  17<L>  |  2.45<H>    Ca    7.3<L>      31 May 2019 08:37  Phos  3.7       Mg     1.5         TPro  5.0<L>  /  Alb  0.7<L>  /  TBili  0.9  /  DBili  x   /  AST  16  /  ALT  7<L>  /  AlkPhos  212<H>        Urinalysis Basic - ( 30 May 2019 21:07 )    Color: Yellow / Appearance: Clear / S.010 / pH: x  Gluc: x / Ketone: Negative  / Bili: Negative / Urobili: Negative mg/dL   Blood: x / Protein: 30 mg/dL / Nitrite: Negative   Leuk Esterase: Trace / RBC: 6-10 /HPF / WBC 6-10   Sq Epi: x / Non Sq Epi: Few / Bacteria: Moderate      Culture Results:   No growth to date. ( @ 19:17)        Impression:  * YUNIEL -- initially due to ATN. Now with a new episode of Cr rise. CT without hydro.  DDx: Gentamicin nephrotoxicity, AIN, septic ATN.   * Perforated viscus complicated by peritonitis, intra-abd collection  * S/p David patch repair  * MSSA bacteremia    Recommendations:   * Keep off Gent  * Transfuse blood  * Avoid further nephrotoxins  * Follow repeat Cr in 24h

## 2019-05-31 NOTE — PROGRESS NOTE ADULT - SUBJECTIVE AND OBJECTIVE BOX
59 y/o male PMHx ETOH abuse, chronic pancreatitis, treated hepatitis C, gout c/o severe lower abdominal pain x 3 days. Pain worsened today. Pain is associated with nausea and multiple episodes of clear vomiting. Patient took 2 aspirins which did not relieve the pain. Last BM was 3 days ago. Last flatus was today. Patient denies fever, chills, constipation, diarrhea, hematuria, melena, hematochezia, chest pain, shortness of breath, dizziness. Patient denies prior incident. (16 May 2019 02:18)      Chief Complaint:  Patient is a 58y old  Male who presents with a chief complaint of Abdominal pain, vomiting (28 May 2019 11:54)      Review of Systems:    General:  No wt loss, fevers, chills, night sweats  Eyes:  Good vision, no reported pain  ENT:  No sore throat, pain, runny nose, dysphagia  CV:  No pain, palpitations, hypo/hypertension  Resp:  No dyspnea, cough, tachypnea, wheezing           Social History/Family History  SOCHX:   tobacco,  -  alcohol    FMHX: FA/MO  - contributory       Discussed with:  PMD, Family    Physical Exam:    Vital Signs:  Vital Signs Last 24 Hrs  T(C): 37.4 (28 May 2019 11:34), Max: 37.4 (28 May 2019 11:34)  T(F): 99.3 (28 May 2019 11:34), Max: 99.3 (28 May 2019 11:34)  HR: 91 (28 May 2019 11:34) (78 - 98)  BP: 148/83 (28 May 2019 11:34) (132/73 - 171/70)  BP(mean): --  RR: 17 (28 May 2019 11:34) (16 - 17)  SpO2: 97% (28 May 2019 11:34) (95% - 97%)  Daily     Daily Weight in k.6 (28 May 2019 05:26)  I&O's Summary    27 May 2019 07:01  -  28 May 2019 07:00  --------------------------------------------------------  IN: 850 mL / OUT: 1112 mL / NET: -262 mL       conjunctivae clear, no thyromegaly, nodules, adenopathy, no JVD  Chest:  Full & symmetric excursion, no increased effort, breath sounds clear  Cardiovascular:  Regular rhythm, S1, S2, no murmur/rub/S3/S4, no carotid/femoral/abdominal bruit, radial/pedal pulses 2+,  edema  Abdomen:  Soft, non-tender, non-distended, normoactive bowel sounds, no HSM      Laboratory:                          7.3    22.75 )-----------( 645      ( 28 May 2019 06:57 )             22.3     05-28    150<H>  |  121<H>  |  18  ----------------------------<  83  3.5   |  20<L>  |  1.90<H>    Ca    7.6<L>      28 May 2019 06:57  Phos  3.7     -  Mg     1.5                 CAPILLARY BLOOD GLUCOSE      POCT Blood Glucose.: 82 mg/dL (28 May 2019 10:41)  POCT Blood Glucose.: 91 mg/dL (28 May 2019 07:19)  POCT Blood Glucose.: 73 mg/dL (27 May 2019 22:11)  POCT Blood Glucose.: 77 mg/dL (27 May 2019 15:41)            Assessment:  Bacteremia  Long history and hospital course noted and reviewed  ID noted  TTE noted  Last BC now negative, 5 days  Remains  afebrile  ISAIAS cancelled due to H/H drop

## 2019-05-31 NOTE — CHART NOTE - NSCHARTNOTEFT_GEN_A_CORE
RN reports FS of 76.  Pt is asymptomatic at this time.  Pt is NPO for ISAIAS later today.   Will change IV to D51/2 NS.

## 2019-05-31 NOTE — PROGRESS NOTE ADULT - SUBJECTIVE AND OBJECTIVE BOX
SURGERY PROGRESS HPI:  Pt seen and examined at bedside. Denies pain or complaints. No acute events overnight. Pt tolerating low residue diet. Pt denies nausea and vomiting.  +BM/flatus. Voiding well. Pt denies chest pain, SOB, dizziness, fever, chills.     Vital Signs Last 24 Hrs  T(C): 36.4 (31 May 2019 05:34), Max: 36.6 (30 May 2019 23:37)  T(F): 97.6 (31 May 2019 05:34), Max: 97.9 (30 May 2019 23:37)  HR: 92 (31 May 2019 05:34) (81 - 92)  BP: 152/86 (31 May 2019 05:34) (152/86 - 170/89)  BP(mean): --  RR: 18 (31 May 2019 05:34) (17 - 20)  SpO2: 98% (31 May 2019 05:34) (92% - 100%)      PHYSICAL EXAM:  General: NAD, WDWN.   Neuro:  Alert & responsive  HEENT: NCAT, EOMI, conjunctiva clear  CV: +S1+S2 regular rate and rhythm  Lung: clear to ausculation bilaterally, respirations nonlabored, good inspiratory effort  ABDOMEN: Midline wound clean and dry, no drainage or bleeding. JPs intact with minimal serous drainage, +BS, soft, mild postop area tenderness  Extremities: no pedal edema or calf tenderness noted    I&O's Detail    30 May 2019 07:01  -  31 May 2019 07:00  --------------------------------------------------------  IN:    Oral Fluid: 240 mL    sodium chloride 0.45%.: 1725 mL    Solution: 100 mL    Solution: 100 mL  Total IN: 2165 mL    OUT:    Drain: 2.5 mL    Voided: 400 mL  Total OUT: 402.5 mL    Total NET: 1762.5 mL          LABS:                        8.4    19.43 )-----------( 789      ( 30 May 2019 08:33 )             26.2     05-30    149<H>  |  122<H>  |  17  ----------------------------<  91  3.4<L>   |  18<L>  |  2.33<H>    Ca    7.8<L>      30 May 2019 08:33  Phos  3.7     -  Mg     1.6         Urinalysis Basic - ( 30 May 2019 21:07 )    Color: Yellow / Appearance: Clear / S.010 / pH: x  Gluc: x / Ketone: Negative  / Bili: Negative / Urobili: Negative mg/dL   Blood: x / Protein: 30 mg/dL / Nitrite: Negative   Leuk Esterase: Trace / RBC: 6-10 /HPF / WBC 6-10   Sq Epi: x / Non Sq Epi: Few / Bacteria: Moderate    Culture Results:   No growth to date. ( @ 19:17)      US Duplex Venous Upper Ext Ltd, Right (19 @ 17:01)   IMPRESSION:   No evidence of right upper extremity deep venous thrombosis.    CT Abdomen and Pelvis w/ Oral Cont (19 @ 11:50) >  Impression: Mild bilateral pleural effusions, grossly unchanged. Mild   pulmonary infiltrate in the lingula, suggestive of pneumonia. Small   anterior pericardial effusion.     Interval placement of a percutaneous pigtail drainage catheter in the   right hepatic subcapsular fluid collection which has decreased in size.    Distended gallbladder.    Stable small calcifications in the pancreas, compatible with chronic   pancreatitis.    Perirectal stranding/edema may be due to proctitis.    Apparent focal mural thickening at the hepatic flexure. Colonoscopy may   be pursued to rule out colon cancer.    Retrogastric fluid collection has decreased in size.    Other findings as above.        Impression: 58M PMH ETOH abuse, pancreatitis, hep C a/w gastric perforation POD#15 s/p ex-lap, yusef patch repair, peritoneal lavage. Found to have retrogastric collection and/or lateral perihepatic/subcapsular collection drainage s/sp perc drainage   UGIS showed no leak or extravasation, NOW with Positive blood cultures    Plan:  - ISAIAS per cardiology  - cont drains to self suction, monitor output  - continue antibiotics per ID, trend WBC  - pain management  - cont. LRD as tolerated  - Continue with medical comanagement  - DVT ppx, GI ppx, incentive spirometer, increase activity with PT  - will d/w surgical attending

## 2019-05-31 NOTE — PROGRESS NOTE ADULT - PROBLEM SELECTOR PLAN 2
sec to above  blood c/s positive for MSSA from 5/24 and on admission   ECHO neg for  IE  will need ISAIAS to r/o IE as positive for persistent Bacteremia (day 7 blood c/s positive )   repeat blood c/s ordered  cardiology on board: awaiting ISAIAS today  Repeat blood c/s neg now from 5/29   MRI spine : neg for diskitis/OM   cont nafcillin for now  duration to be decided soon

## 2019-05-31 NOTE — CHART NOTE - NSCHARTNOTEFT_GEN_A_CORE
Pt seen and examined at bedside, no complaints. As discussed with attending, left splenic CINDY drain to be removed.     CINDY drain to be removed was identified, taken off suction. Sutures and drain was removed, no active bleeding from site and was covered with dry, sterile dressing. Pt. tolerated removal well. Pt seen and examined at bedside, no complaints. As discussed with attending, left splenic CINDY drain to be removed.     CINDY drain to be removed was identified, taken off suction. Sutures and drain was removed, no active bleeding from site and was covered with dry, sterile dressing. Pt. tolerated drain removal well.

## 2019-05-31 NOTE — CHART NOTE - NSCHARTNOTEFT_GEN_A_CORE
Pt seen resting comfortably in bed.  Fingerstick this am was 78, IVF D5 1/2NS was added.    Labs reviewed:                        7.2    16.55 )-----------( 659      ( 31 May 2019 08:37 )             22.1   05-31    146<H>  |  120<H>  |  16  ----------------------------<  85  3.7   |  17<L>  |  2.45<H>    Ca    7.3<L>      31 May 2019 08:37  Phos  3.7     05-31  Mg     1.5     05-31    TPro  5.0<L>  /  Alb  0.7<L>  /  TBili  0.9  /  DBili  x   /  AST  16  /  ALT  7<L>  /  AlkPhos  212<H>  05-31    Leukocytosis improving, c/w abx per ID, Follow up noted, gent d/c'ed; c/w nafcillin/flagyl  Renal input appreciated, Gent nephrotoxicity/AIN/Septic ATN. Will continue to trend renal function.  For ISAIAS today rule out IE  Anemia noted, watch H/h and transfuse as needed. Will d/w Dr Murray.

## 2019-05-31 NOTE — PROGRESS NOTE ADULT - ASSESSMENT
57 yo M w/ history of gout, ETOH abuse, chronic pancreatitis, hep C p/w YUNIEL & sepsis POA from gastric perforation and purulent peritonitis & had ex-lap, yusef patch repair, peritoneal lavage on 5/16.     Purulent peritonitis & gastric perforation w/ sepsis POA and persistent MSSA bacteremia  - leukocytosis improving  - retrogastric collection and/or lateral perihepatic/subcapsular collection drained by IR on 5/22 (250cc drained)  - OR & blood cultures grew MSSA, NGTD on repeat blood cx from 5/28  - on nafcillin and flagyl as per ID   - TTE on 5/21 showed EF 60-65% w/ no evidence of valvular vegetation   - ISAIAS scheduled for today  - MRI of thoracolumbar spine was unremarkable & did not show diskitis /OM, Cervical spine MRI only showed chronic degenerative changes of C3-C6, with multilevel sac effacement     Hypomagnesemia  - replete w/ IV Mg    gastric perforation status post ex-lap, yusef patch repair, peritoneal lavage on 5/16  - status post extubation on 5/17  - UGIS showed no leak or extravasation on 5/23  - transferred out of ICU on 5/23  - care as per surgery  - pain meds as per surgery    YUNIEL  - initially due to ATN, now renal suspects Gentamicin nephrotoxicity, AIN, septic ATN  - CT without hydro   - checking Gent level     Post operative anemia on chronic anemia  - received 3 units on 5/16    Prophylaxis:  DVT: heparin   GI: Carafate and Protonix

## 2019-05-31 NOTE — PROGRESS NOTE ADULT - PROBLEM SELECTOR PLAN 1
sec to stomach perforation s/p yusef patch repair   initial OR c/s and blood c/+ MSSA  on nafcillin(day 12 ) for MSSA infection   genta discontinued ,received with level for 11 days   now YUNIEL worsening so will hold off ,all c/s stayed neg for Gram neg as such  cr worsening ,will hold off on any more genta  (received for 10 days with level )  cont flagyl

## 2019-05-31 NOTE — PROGRESS NOTE ADULT - SUBJECTIVE AND OBJECTIVE BOX
57 yo M w/ history of gout, ETOH abuse, chronic pancreatitis, hep C p/w YUNIEL & sepsis POA from gastric perforation and purulent peritonitis & had ex-lap, yusef patch repair, peritoneal lavage on . He is lying in bed in NAD.    MEDICATIONS  (STANDING):  chlorhexidine 4% Liquid 1 Application(s) Topical <User Schedule>  dextrose 5% + sodium chloride 0.45%. 1000 milliLiter(s) (75 mL/Hr) IV Continuous <Continuous>  heparin  Injectable 5000 Unit(s) SubCutaneous every 12 hours  metroNIDAZOLE  IVPB 500 milliGRAM(s) IV Intermittent every 8 hours  nafcillin  IVPB 2 Gram(s) IV Intermittent every 4 hours  nafcillin  IVPB      pantoprazole  Injectable 40 milliGRAM(s) IV Push every 12 hours  sucralfate 1 Gram(s) Oral every 6 hours    MEDICATIONS  (PRN):  ondansetron Injectable 4 milliGRAM(s) IV Push every 6 hours PRN Nausea and/or Vomiting  oxyCODONE    5 mG/acetaminophen 325 mG 2 Tablet(s) Oral every 4 hours PRN Moderate Pain (4 - 6)      Allergies    No Known Allergies    Intolerances        Vital Signs Last 24 Hrs  T(C): 36.7 (31 May 2019 17:12), Max: 36.8 (31 May 2019 11:07)  T(F): 98 (31 May 2019 17:12), Max: 98.3 (31 May 2019 11:07)  HR: 81 (31 May 2019 17:12) (81 - 92)  BP: 125/74 (31 May 2019 17:12) (125/74 - 166/82)  BP(mean): --  RR: 16 (31 May 2019 17:12) (16 - 19)  SpO2: 99% (31 May 2019 17:12) (97% - 99%)    PHYSICAL EXAM:  GENERAL: NAD, well-groomed, well-developed  HEAD:  Atraumatic, Normocephalic  EYES: EOMI, PERRLA   NECK: Supple, No JVD, Normal thyroid  NERVOUS SYSTEM:  Alert    CHEST/LUNG: Clear to auscultation bilaterally; No rales, rhonchi, wheezing, or rubs  HEART: Regular rate and rhythm; No murmurs, rubs, or gallops  ABDOMEN: Soft, mildly tender, Nondistended; Bowel sounds present, surgical scar   EXTREMITIES: B/l LE and RUE edema    LABS:                        7.2    16.55 )-----------( 659      ( 31 May 2019 08:37 )             22.1         146<H>  |  120<H>  |  16  ----------------------------<  85  3.7   |  17<L>  |  2.45<H>    Ca    7.3<L>      31 May 2019 08:37  Phos  3.7       Mg     1.5         TPro  5.0<L>  /  Alb  0.7<L>  /  TBili  0.9  /  DBili  x   /  AST  16  /  ALT  7<L>  /  AlkPhos  212<H>        Urinalysis Basic - ( 30 May 2019 21:07 )    Color: Yellow / Appearance: Clear / S.010 / pH: x  Gluc: x / Ketone: Negative  / Bili: Negative / Urobili: Negative mg/dL   Blood: x / Protein: 30 mg/dL / Nitrite: Negative   Leuk Esterase: Trace / RBC: 6-10 /HPF / WBC 6-10   Sq Epi: x / Non Sq Epi: Few / Bacteria: Moderate      CAPILLARY BLOOD GLUCOSE      POCT Blood Glucose.: 141 mg/dL (31 May 2019 16:23)  POCT Blood Glucose.: 86 mg/dL (31 May 2019 11:09)  POCT Blood Glucose.: 78 mg/dL (31 May 2019 07:59)  POCT Blood Glucose.: 87 mg/dL (30 May 2019 22:09)      Culture - Blood (collected 28 May 2019 19:17)  Source: .Blood  Preliminary Report (29 May 2019 20:01):    No growth to date.      RADIOLOGY & ADDITIONAL TESTS:    19 @ 07:01  -  19 @ 07:00  --------------------------------------------------------  IN:    Oral Fluid: 240 mL    sodium chloride 0.45%: 1725 mL    Solution: 100 mL    Solution: 100 mL  Total IN: 2165 mL    OUT:    Drain: 2.5 mL    Voided: 400 mL  Total OUT: 402.5 mL    Total NET: 1762.5 mL

## 2019-05-31 NOTE — PROGRESS NOTE ADULT - SUBJECTIVE AND OBJECTIVE BOX
Patient is a 58y old  Male who presents with a chief complaint of Abdominal pain, vomiting (31 May 2019 12:42)      INTERVAL HPI / OVERNIGHT EVENTS: doing ok .awaiting for ISAIAS    MEDICATIONS  (STANDING):  chlorhexidine 4% Liquid 1 Application(s) Topical <User Schedule>  dextrose 5% + sodium chloride 0.45%. 1000 milliLiter(s) (75 mL/Hr) IV Continuous <Continuous>  heparin  Injectable 5000 Unit(s) SubCutaneous every 12 hours  metroNIDAZOLE  IVPB 500 milliGRAM(s) IV Intermittent every 8 hours  morphine  - Injectable 1 milliGRAM(s) IV Push once  nafcillin  IVPB 2 Gram(s) IV Intermittent every 4 hours  nafcillin  IVPB      pantoprazole  Injectable 40 milliGRAM(s) IV Push every 12 hours  sucralfate 1 Gram(s) Oral every 6 hours    MEDICATIONS  (PRN):  ondansetron Injectable 4 milliGRAM(s) IV Push every 6 hours PRN Nausea and/or Vomiting  oxyCODONE    5 mG/acetaminophen 325 mG 2 Tablet(s) Oral every 4 hours PRN Moderate Pain (4 - 6)      Vital Signs Last 24 Hrs  T(C): 36.8 (31 May 2019 11:07), Max: 36.8 (31 May 2019 11:07)  T(F): 98.3 (31 May 2019 11:07), Max: 98.3 (31 May 2019 11:07)  HR: 88 (31 May 2019 11:07) (81 - 92)  BP: 131/82 (31 May 2019 11:07) (131/82 - 166/82)  BP(mean): --  RR: 19 (31 May 2019 11:07) (18 - 20)  SpO2: 97% (31 May 2019 11:07) (92% - 98%)    Review of systems:  General : no fever /chills,fatigue  CVS : no chest pain, palpitations  Lungs : no shortness of breath, cough  GI : no abdominal pain,vomiting, diarrhea   : no dysuria,hematuria        PHYSICAL EXAM:  General :NAD  Constitutional:  well-groomed, well-developed  Respiratory: CTAB/L  Cardiovascular: S1 and S2, RRR, no M/G/R  Gastrointestinal: BS+, exlap with drains  Extremities: No peripheral edema  Vascular: 2+ peripheral pulses  Skin: No rashes      LABS:                        7.2    16.55 )-----------( 659      ( 31 May 2019 08:37 )             22.1         146<H>  |  120<H>  |  16  ----------------------------<  85  3.7   |  17<L>  |  2.45<H>    Ca    7.3<L>      31 May 2019 08:37  Phos  3.7       Mg     1.5         TPro  5.0<L>  /  Alb  0.7<L>  /  TBili  0.9  /  DBili  x   /  AST  16  /  ALT  7<L>  /  AlkPhos  212<H>      Urinalysis Basic - ( 30 May 2019 21:07 )    Color: Yellow / Appearance: Clear / S.010 / pH: x  Gluc: x / Ketone: Negative  / Bili: Negative / Urobili: Negative mg/dL   Blood: x / Protein: 30 mg/dL / Nitrite: Negative   Leuk Esterase: Trace / RBC: 6-10 /HPF / WBC 6-10   Sq Epi: x / Non Sq Epi: Few / Bacteria: Moderate          MICROBIOLOGY:  RECENT CULTURES:   .Blood XXXX XXXX   No growth to date.          RADIOLOGY & ADDITIONAL STUDIES:    Ct abdo  : Mild bilateral pleural effusions, grossly unchanged. Mild   pulmonary infiltrate in the lingula, suggestive of pneumonia. Small   anterior pericardial effusion.     Interval placement of a percutaneous pigtail drainage catheter in the   right hepatic subcapsular fluid collection which has decreased in size.    Distended gallbladder.    Stable small calcifications in the pancreas, compatible with chronic   pancreatitis.    Perirectal stranding/edema may be due to proctitis.    Apparent focal mural thickening at the hepatic flexure. Colonoscopy may   be pursued to rule out colon cancer.    Retrogastric fluid collection has decreased in size.

## 2019-05-31 NOTE — CHART NOTE - NSCHARTNOTEFT_GEN_A_CORE
Assessment:   Pt  adm c perforated viscus, complicated by peritonitis, intra-abdominal collection, s/p David patch repair, MSSA bacteremia, YUNIEL    Factors impacting intake: [ ] none [ ] nausea  [ ] vomiting [ ] diarrhea [ ] constipation  [ ]chewing problems [ ] swallowing issues  [x ] other: s/p surgery, post -op complications, s/p BM @ present     Diet Prescription: Diet, Low Fiber:   Low Sodium  No Carb Prosource (1pkg = 15gms Protein)     Qty per Day:  2  Supplement Feeding Modality:  Oral  Ensure Clear Cans or Servings Per Day:  3       Frequency:  Daily (05-28-19 @ 11:50)    Intake: pt c improved oral intake for lunch meal today, consumed >75%, overall c <50% energy intake     Current Weight: 05/31, 74.9 kg, 05/16, 62.7 kg, c wt. gain of 12.2 kg  % Weight Change: 19.5%  1+ feet & arm edema noted     Pertinent Medications: MEDICATIONS  (STANDING):  chlorhexidine 4% Liquid 1 Application(s) Topical <User Schedule>  dextrose 5% + sodium chloride 0.45%. 1000 milliLiter(s) (75 mL/Hr) IV Continuous <Continuous>  heparin  Injectable 5000 Unit(s) SubCutaneous every 12 hours  metroNIDAZOLE  IVPB 500 milliGRAM(s) IV Intermittent every 8 hours  morphine  - Injectable 1 milliGRAM(s) IV Push once  nafcillin  IVPB 2 Gram(s) IV Intermittent every 4 hours  nafcillin  IVPB      pantoprazole  Injectable 40 milliGRAM(s) IV Push every 12 hours  sucralfate 1 Gram(s) Oral every 6 hours    MEDICATIONS  (PRN):  ondansetron Injectable 4 milliGRAM(s) IV Push every 6 hours PRN Nausea and/or Vomiting  oxyCODONE    5 mG/acetaminophen 325 mG 2 Tablet(s) Oral every 4 hours PRN Moderate Pain (4 - 6)    Pertinent Labs: 05-31 Na146 mmol/L<H> Glu 85 mg/dL K+ 3.7 mmol/L Cr  2.45 mg/dL<H> BUN 16 mg/dL 05-31 Phos 3.7 mg/dL 05-31 Alb 0.7 g/dL<L>     CAPILLARY BLOOD GLUCOSE      POCT Blood Glucose.: 86 mg/dL (31 May 2019 11:09)  POCT Blood Glucose.: 78 mg/dL (31 May 2019 07:59)  POCT Blood Glucose.: 87 mg/dL (30 May 2019 22:09)  POCT Blood Glucose.: 94 mg/dL (30 May 2019 15:51)    Skin: w/o pressure ulcers     Estimated Needs:   [ x] no change since previous assessment (05/17/19)  [ ] recalculated:     Previous Nutrition Diagnosis:    [x ] Malnutrition; severe malnutrition in context of acute illness     Related to: inadequate protein-energy intake in setting of gastric perforation, s/p exploratory laparotomy, david patch      As evidenced by: <50% nutrition needs x 12 days, 2+ & 4+ edema , now c overall intake <50% of meals x 14 days, c improvement today for lunch meal                              Nutrition Diagnosis is [x ] ongoing, improving   [ ] resolved [ ] not applicable     New Nutrition Diagnosis: [ x] not applicable       Interventions:   Recommend  [ ] Change Diet To:  [ ] Nutrition Supplement  [ ] Nutrition Support  [ ] Other:     Monitoring and Evaluation:   [ ] PO intake [ x ] Tolerance to diet prescription [ x ] weights [ x ] labs[ x ] follow up per protocol  [ ] other: Assessment:   Pt  adm c perforated viscus, complicated by peritonitis, intra-abdominal collection, s/p David patch repair, MSSA bacteremia, YUNIEL    Factors impacting intake: [ ] none [ ] nausea  [ ] vomiting [ ] diarrhea [ ] constipation  [ ]chewing problems [ ] swallowing issues  [x ] other: s/p surgery, post -op complications, s/p BM @ present     Diet Prescription: Diet, Low Fiber:   Low Sodium  No Carb Prosource (1pkg = 15gms Protein)     Qty per Day:  2  Supplement Feeding Modality:  Oral  Ensure Clear Cans or Servings Per Day:  3       Frequency:  Daily (05-28-19 @ 11:50)    Intake: pt c improved oral intake for lunch meal today, consumed >75%, overall c <50% energy intake, taking 1-2 Ensure Clear and 1 No carb Prosource as per pt    Current Weight: 05/31, 74.9 kg, 05/16, 62.7 kg, c wt. gain of 12.2 kg  % Weight Change: 19.5%  1+ feet & arm edema noted     Nutrition focused physical exam conducted; Subcutaneous fat Exam;  [ Mild  ]  Orbital fat pads region,  [ Unable   ]Buccal fat region,  [ Unable , edema noted  ]triceps region, [ Unable  ]ribs region.  Muscle Exam; [ Mild  ]temples region, [ Moderate  ]clavicle region, [ Moderate  ]shoulder region, [ Unable  ]Scapula region, [ Unable, appeared to have edema  ]Interosseous region, [ Unable   ]thigh region, [ Unable, c edema  ]Calf region    Pertinent Medications: MEDICATIONS  (STANDING):  chlorhexidine 4% Liquid 1 Application(s) Topical <User Schedule>  dextrose 5% + sodium chloride 0.45%. 1000 milliLiter(s) (75 mL/Hr) IV Continuous <Continuous>  heparin  Injectable 5000 Unit(s) SubCutaneous every 12 hours  metroNIDAZOLE  IVPB 500 milliGRAM(s) IV Intermittent every 8 hours  morphine  - Injectable 1 milliGRAM(s) IV Push once  nafcillin  IVPB 2 Gram(s) IV Intermittent every 4 hours  nafcillin  IVPB      pantoprazole  Injectable 40 milliGRAM(s) IV Push every 12 hours  sucralfate 1 Gram(s) Oral every 6 hours    MEDICATIONS  (PRN):  ondansetron Injectable 4 milliGRAM(s) IV Push every 6 hours PRN Nausea and/or Vomiting  oxyCODONE    5 mG/acetaminophen 325 mG 2 Tablet(s) Oral every 4 hours PRN Moderate Pain (4 - 6)    Pertinent Labs: 05-31 Na146 mmol/L<H> Glu 85 mg/dL K+ 3.7 mmol/L Cr  2.45 mg/dL<H> BUN 16 mg/dL 05-31 Phos 3.7 mg/dL 05-31 Alb 0.7 g/dL<L>     CAPILLARY BLOOD GLUCOSE      POCT Blood Glucose.: 86 mg/dL (31 May 2019 11:09)  POCT Blood Glucose.: 78 mg/dL (31 May 2019 07:59)  POCT Blood Glucose.: 87 mg/dL (30 May 2019 22:09)  POCT Blood Glucose.: 94 mg/dL (30 May 2019 15:51)    Skin: w/o pressure ulcers     Estimated Needs:   [ x] no change since previous assessment (05/17/19)  [ ] recalculated:     Previous Nutrition Diagnosis:    [x ] Malnutrition; severe malnutrition in context of acute illness     Related to: inadequate protein-energy intake in setting of gastric perforation, s/p exploratory laparotomy, david patch      As evidenced by: <50% nutrition needs x 12 days, 2+ & 4+ edema , now c overall intake <50% of meals x 14 days, c improvement today for lunch meal                              Nutrition Diagnosis is [x ] ongoing, improving   [ ] resolved [ ] not applicable     New Nutrition Diagnosis: [ x] not applicable       Interventions:   Recommend  [x ] Change Diet To: low fiber, Low sodium No Carb Prosource 1 pkg daily=60 calories, 15 grams protein per pkg , Ensure Clear 8 oz 2x/day (480 elizabeth, 16 gm pro)   [ ] Nutrition Supplement  [ ] Nutrition Support  [ ] Other:     Monitoring and Evaluation:   [ x] PO intake [ x ] Tolerance to diet prescription [ x ] weights [ x ] labs[ x ] follow up per protocol  [ ] other: Assessment:   Pt  adm c perforated viscus, complicated by peritonitis, intra-abdominal collection, s/p David patch repair, MSSA bacteremia, YUNIEL    Factors impacting intake: [ ] none [ ] nausea  [ ] vomiting [ ] diarrhea [ ] constipation  [ ]chewing problems [ ] swallowing issues  [x ] other: s/p surgery, post -op complications, s/p BM @ present     Diet Prescription: Diet, Low Fiber:   Low Sodium  No Carb Prosource (1pkg = 15gms Protein)     Qty per Day:  2  Supplement Feeding Modality:  Oral  Ensure Clear Cans or Servings Per Day:  3       Frequency:  Daily (05-28-19 @ 11:50)    Intake: pt c improved oral intake for lunch meal today, consumed >75%, overall c <50% energy intake, taking 1-2 Ensure Clear and 1 No carb Prosource as per pt    Current Weight: 05/31, 74.9 kg, 05/16, 62.7 kg, c wt. gain of 12.2 kg  % Weight Change: 19.5%  1+ feet & arm edema noted     Nutrition focused physical exam conducted; Subcutaneous fat Exam;  [ Mild  ]  Orbital fat pads region,  [ Unable   ]Buccal fat region,  [ Unable , edema noted  ]triceps region, [ Unable  ]ribs region.  Muscle Exam; [ Mild  ]temples region, [ Moderate  ]clavicle region, [ Moderate  ]shoulder region, [ Unable  ]Scapula region, [ Unable, appeared to have edema  ]Interosseous region, [ Unable   ]thigh region, [ Unable, c edema  ]Calf region    Pertinent Medications: MEDICATIONS  (STANDING):  chlorhexidine 4% Liquid 1 Application(s) Topical <User Schedule>  dextrose 5% + sodium chloride 0.45%. 1000 milliLiter(s) (75 mL/Hr) IV Continuous <Continuous>  heparin  Injectable 5000 Unit(s) SubCutaneous every 12 hours  metroNIDAZOLE  IVPB 500 milliGRAM(s) IV Intermittent every 8 hours  morphine  - Injectable 1 milliGRAM(s) IV Push once  nafcillin  IVPB 2 Gram(s) IV Intermittent every 4 hours  nafcillin  IVPB      pantoprazole  Injectable 40 milliGRAM(s) IV Push every 12 hours  sucralfate 1 Gram(s) Oral every 6 hours    MEDICATIONS  (PRN):  ondansetron Injectable 4 milliGRAM(s) IV Push every 6 hours PRN Nausea and/or Vomiting  oxyCODONE    5 mG/acetaminophen 325 mG 2 Tablet(s) Oral every 4 hours PRN Moderate Pain (4 - 6)    Pertinent Labs: 05-31 Na146 mmol/L<H> Glu 85 mg/dL K+ 3.7 mmol/L Cr  2.45 mg/dL<H> BUN 16 mg/dL 05-31 Phos 3.7 mg/dL 05-31 Alb 0.7 g/dL<L>     CAPILLARY BLOOD GLUCOSE      POCT Blood Glucose.: 86 mg/dL (31 May 2019 11:09)  POCT Blood Glucose.: 78 mg/dL (31 May 2019 07:59)  POCT Blood Glucose.: 87 mg/dL (30 May 2019 22:09)  POCT Blood Glucose.: 94 mg/dL (30 May 2019 15:51)    Skin: w/o pressure ulcers     Estimated Needs:   [ x] no change since previous assessment (05/17/19)  [ ] recalculated:     Previous Nutrition Diagnosis: (05/28/19 changed from chronic to acute severe)   [x ] Malnutrition; severe malnutrition in context of acute illness     Related to: inadequate protein-energy intake in setting of gastric perforation, s/p exploratory laparotomy, david patch      As evidenced by: <50% nutrition needs x 12 days, 2+ & 4+ edema , now c overall intake <50% of meals x 14 days, c improvement today for lunch meal                              Nutrition Diagnosis is [x ] ongoing, improving   [ ] resolved [ ] not applicable     New Nutrition Diagnosis: [ x] not applicable       Interventions:   Recommend  [x ] Change Diet To: low fiber, Low sodium No Carb Prosource 1 pkg daily=60 calories, 15 grams protein per pkg , Ensure Clear 8 oz 2x/day (480 elizabeth, 16 gm pro)   [ ] Nutrition Supplement  [ ] Nutrition Support  [ ] Other:     Monitoring and Evaluation:   [ x] PO intake [ x ] Tolerance to diet prescription [ x ] weights [ x ] labs[ x ] follow up per protocol  [ ] other:

## 2019-06-01 LAB
ANION GAP SERPL CALC-SCNC: 9 MMOL/L — SIGNIFICANT CHANGE UP (ref 5–17)
BUN SERPL-MCNC: 16 MG/DL — SIGNIFICANT CHANGE UP (ref 7–23)
CALCIUM SERPL-MCNC: 7.1 MG/DL — LOW (ref 8.5–10.1)
CHLORIDE SERPL-SCNC: 119 MMOL/L — HIGH (ref 96–108)
CO2 SERPL-SCNC: 19 MMOL/L — LOW (ref 22–31)
CREAT SERPL-MCNC: 2.94 MG/DL — HIGH (ref 0.5–1.3)
GLUCOSE SERPL-MCNC: 113 MG/DL — HIGH (ref 70–99)
HCT VFR BLD CALC: 22 % — LOW (ref 39–50)
HGB BLD-MCNC: 7.1 G/DL — LOW (ref 13–17)
MAGNESIUM SERPL-MCNC: 2.3 MG/DL — SIGNIFICANT CHANGE UP (ref 1.6–2.6)
MCHC RBC-ENTMCNC: 29.3 PG — SIGNIFICANT CHANGE UP (ref 27–34)
MCHC RBC-ENTMCNC: 32.3 GM/DL — SIGNIFICANT CHANGE UP (ref 32–36)
MCV RBC AUTO: 90.9 FL — SIGNIFICANT CHANGE UP (ref 80–100)
NRBC # BLD: 0 /100 WBCS — SIGNIFICANT CHANGE UP (ref 0–0)
PHOSPHATE SERPL-MCNC: 3.7 MG/DL — SIGNIFICANT CHANGE UP (ref 2.5–4.5)
PLATELET # BLD AUTO: 669 K/UL — HIGH (ref 150–400)
POTASSIUM SERPL-MCNC: 3.3 MMOL/L — LOW (ref 3.5–5.3)
POTASSIUM SERPL-SCNC: 3.3 MMOL/L — LOW (ref 3.5–5.3)
RBC # BLD: 2.42 M/UL — LOW (ref 4.2–5.8)
RBC # FLD: 21.1 % — HIGH (ref 10.3–14.5)
SODIUM SERPL-SCNC: 147 MMOL/L — HIGH (ref 135–145)
WBC # BLD: 14 K/UL — HIGH (ref 3.8–10.5)
WBC # FLD AUTO: 14 K/UL — HIGH (ref 3.8–10.5)

## 2019-06-01 PROCEDURE — 99233 SBSQ HOSP IP/OBS HIGH 50: CPT

## 2019-06-01 RX ORDER — MORPHINE SULFATE 50 MG/1
2 CAPSULE, EXTENDED RELEASE ORAL EVERY 4 HOURS
Refills: 0 | Status: DISCONTINUED | OUTPATIENT
Start: 2019-06-01 | End: 2019-06-04

## 2019-06-01 RX ORDER — DEXTROSE MONOHYDRATE, SODIUM CHLORIDE, AND POTASSIUM CHLORIDE 50; .745; 4.5 G/1000ML; G/1000ML; G/1000ML
1000 INJECTION, SOLUTION INTRAVENOUS
Refills: 0 | Status: DISCONTINUED | OUTPATIENT
Start: 2019-06-01 | End: 2019-06-03

## 2019-06-01 RX ORDER — MORPHINE SULFATE 50 MG/1
2 CAPSULE, EXTENDED RELEASE ORAL ONCE
Refills: 0 | Status: DISCONTINUED | OUTPATIENT
Start: 2019-06-01 | End: 2019-06-01

## 2019-06-01 RX ORDER — CALCIUM GLUCONATE 100 MG/ML
2 VIAL (ML) INTRAVENOUS ONCE
Refills: 0 | Status: COMPLETED | OUTPATIENT
Start: 2019-06-01 | End: 2019-06-01

## 2019-06-01 RX ORDER — POTASSIUM CHLORIDE 20 MEQ
40 PACKET (EA) ORAL ONCE
Refills: 0 | Status: COMPLETED | OUTPATIENT
Start: 2019-06-01 | End: 2019-06-01

## 2019-06-01 RX ADMIN — MORPHINE SULFATE 2 MILLIGRAM(S): 50 CAPSULE, EXTENDED RELEASE ORAL at 23:10

## 2019-06-01 RX ADMIN — NAFCILLIN 200 GRAM(S): 10 INJECTION, POWDER, FOR SOLUTION INTRAVENOUS at 09:42

## 2019-06-01 RX ADMIN — MORPHINE SULFATE 2 MILLIGRAM(S): 50 CAPSULE, EXTENDED RELEASE ORAL at 22:53

## 2019-06-01 RX ADMIN — OXYCODONE AND ACETAMINOPHEN 2 TABLET(S): 5; 325 TABLET ORAL at 05:33

## 2019-06-01 RX ADMIN — HEPARIN SODIUM 5000 UNIT(S): 5000 INJECTION INTRAVENOUS; SUBCUTANEOUS at 05:14

## 2019-06-01 RX ADMIN — Medication 50 GRAM(S): at 01:15

## 2019-06-01 RX ADMIN — Medication 40 MILLIEQUIVALENT(S): at 17:03

## 2019-06-01 RX ADMIN — HEPARIN SODIUM 5000 UNIT(S): 5000 INJECTION INTRAVENOUS; SUBCUTANEOUS at 17:03

## 2019-06-01 RX ADMIN — PANTOPRAZOLE SODIUM 40 MILLIGRAM(S): 20 TABLET, DELAYED RELEASE ORAL at 17:03

## 2019-06-01 RX ADMIN — Medication 1 GRAM(S): at 05:14

## 2019-06-01 RX ADMIN — Medication 100 MILLIGRAM(S): at 22:07

## 2019-06-01 RX ADMIN — PANTOPRAZOLE SODIUM 40 MILLIGRAM(S): 20 TABLET, DELAYED RELEASE ORAL at 05:14

## 2019-06-01 RX ADMIN — NAFCILLIN 200 GRAM(S): 10 INJECTION, POWDER, FOR SOLUTION INTRAVENOUS at 05:14

## 2019-06-01 RX ADMIN — NAFCILLIN 200 GRAM(S): 10 INJECTION, POWDER, FOR SOLUTION INTRAVENOUS at 22:08

## 2019-06-01 RX ADMIN — MORPHINE SULFATE 2 MILLIGRAM(S): 50 CAPSULE, EXTENDED RELEASE ORAL at 18:21

## 2019-06-01 RX ADMIN — NAFCILLIN 200 GRAM(S): 10 INJECTION, POWDER, FOR SOLUTION INTRAVENOUS at 01:16

## 2019-06-01 RX ADMIN — Medication 100 MILLIGRAM(S): at 05:14

## 2019-06-01 RX ADMIN — NAFCILLIN 200 GRAM(S): 10 INJECTION, POWDER, FOR SOLUTION INTRAVENOUS at 17:03

## 2019-06-01 RX ADMIN — Medication 100 MILLIGRAM(S): at 13:06

## 2019-06-01 RX ADMIN — OXYCODONE AND ACETAMINOPHEN 2 TABLET(S): 5; 325 TABLET ORAL at 06:00

## 2019-06-01 RX ADMIN — DEXTROSE MONOHYDRATE, SODIUM CHLORIDE, AND POTASSIUM CHLORIDE 75 MILLILITER(S): 50; .745; 4.5 INJECTION, SOLUTION INTRAVENOUS at 22:09

## 2019-06-01 RX ADMIN — Medication 100 GRAM(S): at 21:02

## 2019-06-01 RX ADMIN — NAFCILLIN 200 GRAM(S): 10 INJECTION, POWDER, FOR SOLUTION INTRAVENOUS at 13:06

## 2019-06-01 NOTE — PROGRESS NOTE ADULT - SUBJECTIVE AND OBJECTIVE BOX
57 y/o male PMHx ETOH abuse, chronic pancreatitis, treated hepatitis C, gout c/o severe lower abdominal pain x 3 days. Pain worsened today. Pain is associated with nausea and multiple episodes of clear vomiting. Patient took 2 aspirins which did not relieve the pain. Last BM was 3 days ago. Last flatus was today. Patient denies fever, chills, constipation, diarrhea, hematuria, melena, hematochezia, chest pain, shortness of breath, dizziness. Patient denies prior incident. (16 May 2019 02:18)      Chief Complaint:  Patient is a 58y old  Male who presents with a chief complaint of Abdominal pain, vomiting (28 May 2019 11:54)      Review of Systems:    General:  No wt loss, fevers, chills, night sweats  Eyes:  Good vision, no reported pain  ENT:  No sore throat, pain, runny nose, dysphagia  CV:  No pain, palpitations, hypo/hypertension  Resp:  No dyspnea, cough, tachypnea, wheezing           Social History/Family History  SOCHX:   tobacco,  -  alcohol    FMHX: FA/MO  - contributory       Discussed with:  PMD, Family    Physical Exam:    Vital Signs:  Vital Signs Last 24 Hrs  T(C): 37.4 (28 May 2019 11:34), Max: 37.4 (28 May 2019 11:34)  T(F): 99.3 (28 May 2019 11:34), Max: 99.3 (28 May 2019 11:34)  HR: 91 (28 May 2019 11:34) (78 - 98)  BP: 148/83 (28 May 2019 11:34) (132/73 - 171/70)  BP(mean): --  RR: 17 (28 May 2019 11:34) (16 - 17)  SpO2: 97% (28 May 2019 11:34) (95% - 97%)  Daily     Daily Weight in k.6 (28 May 2019 05:26)  I&O's Summary    27 May 2019 07:01  -  28 May 2019 07:00  --------------------------------------------------------  IN: 850 mL / OUT: 1112 mL / NET: -262 mL       conjunctivae clear, no thyromegaly, nodules, adenopathy, no JVD  Chest:  Full & symmetric excursion, no increased effort, breath sounds clear  Cardiovascular:  Regular rhythm, S1, S2, no murmur/rub/S3/S4, no carotid/femoral/abdominal bruit, radial/pedal pulses 2+,  edema  Abdomen:  Soft, non-tender, non-distended, normoactive bowel sounds, no HSM      Laboratory:                          7.3    22.75 )-----------( 645      ( 28 May 2019 06:57 )             22.3     05-28    150<H>  |  121<H>  |  18  ----------------------------<  83  3.5   |  20<L>  |  1.90<H>    Ca    7.6<L>      28 May 2019 06:57  Phos  3.7     -  Mg     1.5     -            CAPILLARY BLOOD GLUCOSE      POCT Blood Glucose.: 82 mg/dL (28 May 2019 10:41)  POCT Blood Glucose.: 91 mg/dL (28 May 2019 07:19)  POCT Blood Glucose.: 73 mg/dL (27 May 2019 22:11)  POCT Blood Glucose.: 77 mg/dL (27 May 2019 15:41)            Assessment:  Bacteremia  Long history and hospital course noted and reviewed  ID noted  TTE noted  Last BC now negative, 5 days  Remains  afebrile  ISAIAS cancelled due to H/H drop, re-schedule for Monday or Tuesday

## 2019-06-01 NOTE — PROGRESS NOTE ADULT - ASSESSMENT
57 yo M w/ history of gout, ETOH abuse, chronic pancreatitis, hep C p/w YUNIEL & sepsis POA from gastric perforation and purulent peritonitis & had ex-lap, yusef patch repair, peritoneal lavage on 5/16.     Purulent peritonitis & gastric perforation w/ sepsis POA and persistent MSSA bacteremia  - leukocytosis improving  - retrogastric collection and/or lateral perihepatic/subcapsular collection drained by IR on 5/22 (250cc drained)  - OR & blood cultures grew MSSA, NGTD on repeat blood cx from 5/28  - on nafcillin and flagyl as per ID   - TTE on 5/21 showed EF 60-65% w/ no evidence of valvular vegetation   - ISAIAS not done, still pending  - MRI of thoracolumbar spine was unremarkable & did not show diskitis /OM, Cervical spine MRI only showed chronic degenerative changes of C3-C6, with multilevel sac effacement     Hypokalemia  - replete w/ PO KCl    gastric perforation status post ex-lap, yusef patch repair, peritoneal lavage on 5/16  - status post extubation on 5/17  - UGIS showed no leak or extravasation on 5/23  - transferred out of ICU on 5/23  - care as per surgery  - pain meds as per surgery    YUNIEL  - initially due to ATN, now renal suspects Gentamicin nephrotoxicity, AIN, septic ATN  - CT without hydro   - checking Gent level     Post operative anemia on chronic anemia  - received 3 units on 5/16    Prophylaxis:  DVT: heparin   GI: Carafate and Protonix

## 2019-06-01 NOTE — PROGRESS NOTE ADULT - SUBJECTIVE AND OBJECTIVE BOX
Subjective: min abd pain.       MEDICATIONS  (STANDING):  chlorhexidine 4% Liquid 1 Application(s) Topical <User Schedule>  dextrose 5% + sodium chloride 0.45%. 1000 milliLiter(s) (75 mL/Hr) IV Continuous <Continuous>  heparin  Injectable 5000 Unit(s) SubCutaneous every 12 hours  metroNIDAZOLE  IVPB 500 milliGRAM(s) IV Intermittent every 8 hours  nafcillin  IVPB 2 Gram(s) IV Intermittent every 4 hours  nafcillin  IVPB      pantoprazole  Injectable 40 milliGRAM(s) IV Push every 12 hours  sucralfate 1 Gram(s) Oral every 6 hours    MEDICATIONS  (PRN):  ondansetron Injectable 4 milliGRAM(s) IV Push every 6 hours PRN Nausea and/or Vomiting  oxyCODONE    5 mG/acetaminophen 325 mG 2 Tablet(s) Oral every 4 hours PRN Moderate Pain (4 - 6)          T(C): 36.5 (19 @ 05:45), Max: 36.8 (19 @ 11:07)  HR: 78 (19 @ 05:45) (78 - 88)  BP: 148/79 (19 @ 05:45) (125/74 - 151/86)  RR: 16 (19 @ 05:45) (16 - 19)  SpO2: 100% (19 @ 05:45) (97% - 100%)  Wt(kg): --        I&O's Detail    31 May 2019 07:01  -  2019 07:00  --------------------------------------------------------  IN:    dextrose 5% + sodium chloride 0.45%.: 1700 mL    Oral Fluid: 500 mL    Solution: 300 mL    Solution: 250 mL    Solution: 100 mL  Total IN: 2850 mL    OUT:    Bulb: 10 mL    Drain: 2 mL    Drain: 8 mL  Total OUT: 20 mL    Total NET: 2830 mL               PHYSICAL EXAM:      GENERAL: comfortable.   EYES: EOMI, PERRLA, conjunctiva and sclera clear  NECK: Supple, no inc in JVP  CHEST/LUNG: Clear  HEART: S1S2  ABDOMEN: Soft, midline scar post recent Sx. RUQ drain.   EXTREMITIES:  trace edema  NEURO: no asterixis        LABS:  CBC Full  -  ( 2019 07:47 )  WBC Count : 14.00 K/uL  RBC Count : 2.42 M/uL  Hemoglobin : 7.1 g/dL  Hematocrit : 22.0 %  Platelet Count - Automated : 669 K/uL  Mean Cell Volume : 90.9 fl  Mean Cell Hemoglobin : 29.3 pg  Mean Cell Hemoglobin Concentration : 32.3 gm/dL  Auto Neutrophil # : x  Auto Lymphocyte # : x  Auto Monocyte # : x  Auto Eosinophil # : x  Auto Basophil # : x  Auto Neutrophil % : x  Auto Lymphocyte % : x  Auto Monocyte % : x  Auto Eosinophil % : x  Auto Basophil % : x        146<H>  |  120<H>  |  16  ----------------------------<  85  3.7   |  17<L>  |  2.45<H>    Ca    7.3<L>      31 May 2019 08:37  Phos  3.7       Mg     1.5         TPro  5.0<L>  /  Alb  0.7<L>  /  TBili  0.9  /  DBili  x   /  AST  16  /  ALT  7<L>  /  AlkPhos  212<H>        Urinalysis Basic - ( 30 May 2019 21:07 )    Color: Yellow / Appearance: Clear / S.010 / pH: x  Gluc: x / Ketone: Negative  / Bili: Negative / Urobili: Negative mg/dL   Blood: x / Protein: 30 mg/dL / Nitrite: Negative   Leuk Esterase: Trace / RBC: 6-10 /HPF / WBC 6-10   Sq Epi: x / Non Sq Epi: Few / Bacteria: Moderate        Impression:  * YUNIEL -- initially due to ATN. Now with a new episode of Cr rise. CT without hydro.  DDx: Gentamicin nephrotoxicity, AIN, septic ATN.   * Perforated viscus complicated by peritonitis, intra-abd collection  * S/p David patch repair  * MSSA bacteremia    Recommendations:   * Keep off Gent  * Transfuse blood  * Avoid further nephrotoxins  * Follow repeat Cr in 24h

## 2019-06-01 NOTE — PROGRESS NOTE ADULT - SUBJECTIVE AND OBJECTIVE BOX
INTERVAL HPI/OVERNIGHT EVENTS: No acute events overnight. Patient states he is eating but is "testing" which foods give him pain and which do not. Per RN, drains are dry except for 1 which put out 3mL. Patient nervous about potential to leave hospital and go to rehab and then home.    Vital Signs Last 24 Hrs  T(C): 36.5 (2019 05:45), Max: 36.8 (31 May 2019 11:07)  T(F): 97.7 (2019 05:45), Max: 98.3 (31 May 2019 11:07)  HR: 78 (2019 05:45) (78 - 88)  BP: 148/779 (2019 05:45) (125/74 - 151/86)  BP(mean): --  RR: 16 (2019 05:45) (16 - 19)  SpO2: 100% (2019 05:45) (97% - 100%)    MEDICATIONS  (STANDING):  chlorhexidine 4% Liquid 1 Application(s) Topical <User Schedule>  dextrose 5% + sodium chloride 0.45%. 1000 milliLiter(s) (75 mL/Hr) IV Continuous <Continuous>  heparin  Injectable 5000 Unit(s) SubCutaneous every 12 hours  metroNIDAZOLE  IVPB 500 milliGRAM(s) IV Intermittent every 8 hours  nafcillin  IVPB 2 Gram(s) IV Intermittent every 4 hours  nafcillin  IVPB      pantoprazole  Injectable 40 milliGRAM(s) IV Push every 12 hours  sucralfate 1 Gram(s) Oral every 6 hours    MEDICATIONS  (PRN):  ondansetron Injectable 4 milliGRAM(s) IV Push every 6 hours PRN Nausea and/or Vomiting  oxyCODONE    5 mG/acetaminophen 325 mG 2 Tablet(s) Oral every 4 hours PRN Moderate Pain (4 - 6)      PHYSICAL EXAM    GENERAL: AAO in NAD  CHEST/LUNG: No respiratory distress  ABDOMEN: Soft, NTND. Midline incision well-healed. 2 b/l CINDY drains dry. 1 RUQ IR drain with minimal serous fluid in tubing.    I&O:  I&O's Detail    30 May 2019 07:01  -  31 May 2019 07:00  --------------------------------------------------------  IN:    Oral Fluid: 240 mL    sodium chloride 0.45%: 1725 mL    Solution: 100 mL    Solution: 100 mL  Total IN: 2165 mL    OUT:    Drain: 2.5 mL    Voided: 400 mL  Total OUT: 402.5 mL    Total NET: 1762.5 mL      31 May 2019 07:01  -  2019 06:44  --------------------------------------------------------  IN:    dextrose 5% + sodium chloride 0.45%.: 1700 mL    Oral Fluid: 500 mL    Solution: 300 mL    Solution: 100 mL    Solution: 250 mL  Total IN: 2850 mL    OUT:    Bulb: 10 mL    Drain: 2 mL    Drain: 8 mL  Total OUT: 20 mL    Total NET: 2830 mL          LABS:                        7.2    16.55 )-----------( 659      ( 31 May 2019 08:37 )             22.1         146<H>  |  120<H>  |  16  ----------------------------<  85  3.7   |  17<L>  |  2.45<H>    Ca    7.3<L>      31 May 2019 08:37  Phos  3.7       Mg     1.5         TPro  5.0<L>  /  Alb  0.7<L>  /  TBili  0.9  /  DBili  x   /  AST  16  /  ALT  7<L>  /  AlkPhos  212<H>        Urinalysis Basic - ( 30 May 2019 21:07 )    Color: Yellow / Appearance: Clear / S.010 / pH: x  Gluc: x / Ketone: Negative  / Bili: Negative / Urobili: Negative mg/dL   Blood: x / Protein: 30 mg/dL / Nitrite: Negative   Leuk Esterase: Trace / RBC: 6-10 /HPF / WBC 6-10   Sq Epi: x / Non Sq Epi: Few / Bacteria: Moderate            Impression: 58M POD 16 s/p yusef patch repair of perforated viscus c/b RUQ collection drained by IR . STable    Plan:  Continue regular diet  PT/OOB  Consider removing additional drains  AM labs pending  Rehab planning  ISAIAS planning  ABX per ID

## 2019-06-01 NOTE — PROGRESS NOTE ADULT - SUBJECTIVE AND OBJECTIVE BOX
Postoperative Day #: 16  Patient seen and examined bedside resting comfortably.  Reports poor appetite today. No n/v.  No other complaints    T(F): 97.9 (06-01-19 @ 18:17), Max: 98.2 (05-31-19 @ 23:00)  HR: 80 (06-01-19 @ 18:17) (77 - 87)  BP: 145/78 (06-01-19 @ 18:17) (141/83 - 151/86)  RR: 16 (06-01-19 @ 18:17) (16 - 17)  SpO2: 96% (06-01-19 @ 18:17) (96% - 100%)  Wt(kg): --  CAPILLARY BLOOD GLUCOSE      POCT Blood Glucose.: 105 mg/dL (01 Jun 2019 15:41)  POCT Blood Glucose.: 99 mg/dL (01 Jun 2019 10:50)  POCT Blood Glucose.: 107 mg/dL (01 Jun 2019 07:35)  POCT Blood Glucose.: 114 mg/dL (31 May 2019 20:57)      PHYSICAL EXAM:  General: NAD, WDWN  Neuro:  Alert & oriented  HEENT: NCAT, EOMI, conjunctiva clear  Chest: respirations nonlabored, good inspiratory effort  Abdomen: soft, NTND. Right upper quadrant IR drain with scant serous output. Right lower and left lower abd CINDY drains with <5cc serous drainage, removed and dressings applied; patient tolerated well  Extremities: no pedal edema or calf tenderness noted     LABS:                        7.1    14.00 )-----------( 669      ( 01 Jun 2019 07:47 )             22.0     06-01    147<H>  |  119<H>  |  16  ----------------------------<  113<H>  3.3<L>   |  19<L>  |  2.94<H>    Ca    7.1<L>      01 Jun 2019 07:47  Phos  3.7     06-01  Mg     2.3     06-01    TPro  5.0<L>  /  Alb  0.7<L>  /  TBili  0.9  /  DBili  x   /  AST  16  /  ALT  7<L>  /  AlkPhos  212<H>  05-31      Culture Results:   No growth to date. (05-28 @ 19:17)      Impression: 58M PMH ETOH abuse, pancreatitis, hep C a/w gastric perforation POD#16 s/p ex-lap, yusef patch repair, peritoneal lavage  post op course complicated by intraabdominal collection s/p IR perc drainage on 5/22, MSSA bacteremia and gentamycin toxicity, AIN/septic ATN.  Leukocytosis improving  Hypokalemia, supplemented  acute on chronic anemia  Plan:  as per instruction from Dr Murray, remaining 2 surgical drains were removed as detailed above.   Continue local care for IR drain, IR Follow up   ISAIAS per cardiology, watch H/h, will transfuse if needed  continue antibiotics per ID  cont LRD as tolerated  Continue with medical comanagement  DVT ppx, GI ppx, incentive spirometer, increase activity with PT

## 2019-06-01 NOTE — PROGRESS NOTE ADULT - SUBJECTIVE AND OBJECTIVE BOX
59 yo M w/ history of gout, ETOH abuse, chronic pancreatitis, hep C p/w YUNIEL & sepsis POA from gastric perforation and purulent peritonitis & had ex-lap, yusef patch repair, peritoneal lavage on . He is lying in bed in NAD.    MEDICATIONS  (STANDING):  chlorhexidine 4% Liquid 1 Application(s) Topical <User Schedule>  dextrose 5% + sodium chloride 0.45%. 1000 milliLiter(s) (75 mL/Hr) IV Continuous <Continuous>  heparin  Injectable 5000 Unit(s) SubCutaneous every 12 hours  metroNIDAZOLE  IVPB 500 milliGRAM(s) IV Intermittent every 8 hours  nafcillin  IVPB 2 Gram(s) IV Intermittent every 4 hours  nafcillin  IVPB      pantoprazole  Injectable 40 milliGRAM(s) IV Push every 12 hours  sucralfate 1 Gram(s) Oral every 6 hours    MEDICATIONS  (PRN):  ondansetron Injectable 4 milliGRAM(s) IV Push every 6 hours PRN Nausea and/or Vomiting  oxyCODONE    5 mG/acetaminophen 325 mG 2 Tablet(s) Oral every 4 hours PRN Moderate Pain (4 - 6)      Allergies    No Known Allergies    Intolerances        Vital Signs Last 24 Hrs  T(C): 36.3 (2019 11:38), Max: 36.8 (31 May 2019 23:00)  T(F): 97.3 (2019 11:38), Max: 98.2 (31 May 2019 23:00)  HR: 77 (2019 11:38) (77 - 87)  BP: 141/83 (2019 11:38) (141/83 - 151/86)  BP(mean): --  RR: 16 (2019 11:38) (16 - 17)  SpO2: 98% (2019 11:38) (97% - 100%)    PHYSICAL EXAM:  GENERAL: NAD, well-groomed, well-developed  HEAD:  Atraumatic, Normocephalic  EYES: EOMI, PERRLA   NECK: Supple, No JVD, Normal thyroid  NERVOUS SYSTEM:  Alert    CHEST/LUNG: Clear to auscultation bilaterally; No rales, rhonchi, wheezing, or rubs  HEART: Regular rate and rhythm; No murmurs, rubs, or gallops  ABDOMEN: Soft, mildly tender, Nondistended; Bowel sounds present, surgical scar   EXTREMITIES: B/l LE and RUE edema      LABS:                        7.1    14.00 )-----------( 669      ( 2019 07:47 )             22.0     06-    147<H>  |  119<H>  |  16  ----------------------------<  113<H>  3.3<L>   |  19<L>  |  2.94<H>    Ca    7.1<L>      2019 07:47  Phos  3.7     06  Mg     2.3         TPro  5.0<L>  /  Alb  0.7<L>  /  TBili  0.9  /  DBili  x   /  AST  16  /  ALT  7<L>  /  AlkPhos  212<H>        Urinalysis Basic - ( 30 May 2019 21:07 )    Color: Yellow / Appearance: Clear / S.010 / pH: x  Gluc: x / Ketone: Negative  / Bili: Negative / Urobili: Negative mg/dL   Blood: x / Protein: 30 mg/dL / Nitrite: Negative   Leuk Esterase: Trace / RBC: 6-10 /HPF / WBC 6-10   Sq Epi: x / Non Sq Epi: Few / Bacteria: Moderate      CAPILLARY BLOOD GLUCOSE      POCT Blood Glucose.: 105 mg/dL (2019 15:41)  POCT Blood Glucose.: 99 mg/dL (2019 10:50)  POCT Blood Glucose.: 107 mg/dL (2019 07:35)  POCT Blood Glucose.: 114 mg/dL (31 May 2019 20:57)      Culture - Blood (collected 28 May 2019 19:17)  Source: .Blood  Preliminary Report (29 May 2019 20:01):    No growth to date.      RADIOLOGY & ADDITIONAL TESTS:    19 @ 07:01  -  - @ 07:00  --------------------------------------------------------  IN:    dextrose 5% + sodium chloride 0.45%.: 1700 mL    Oral Fluid: 500 mL    Solution: 300 mL    Solution: 100 mL    Solution: 250 mL  Total IN: 2850 mL    OUT:    Bulb: 10 mL    Drain: 2 mL    Drain: 8 mL  Total OUT: 20 mL    Total NET: 2830 mL      19 @ 07:01  -  19 @ 18:05  --------------------------------------------------------  IN:  Total IN: 0 mL    OUT:  Total OUT: 0 mL    Total NET: 0 mL

## 2019-06-02 DIAGNOSIS — R93.3 ABNORMAL FINDINGS ON DIAGNOSTIC IMAGING OF OTHER PARTS OF DIGESTIVE TRACT: ICD-10-CM

## 2019-06-02 DIAGNOSIS — D64.9 ANEMIA, UNSPECIFIED: ICD-10-CM

## 2019-06-02 LAB
ANION GAP SERPL CALC-SCNC: 9 MMOL/L — SIGNIFICANT CHANGE UP (ref 5–17)
BLD GP AB SCN SERPL QL: SIGNIFICANT CHANGE UP
BUN SERPL-MCNC: 16 MG/DL — SIGNIFICANT CHANGE UP (ref 7–23)
CALCIUM SERPL-MCNC: 7.8 MG/DL — LOW (ref 8.5–10.1)
CHLORIDE SERPL-SCNC: 115 MMOL/L — HIGH (ref 96–108)
CO2 SERPL-SCNC: 19 MMOL/L — LOW (ref 22–31)
CREAT SERPL-MCNC: 3.16 MG/DL — HIGH (ref 0.5–1.3)
CULTURE RESULTS: SIGNIFICANT CHANGE UP
GENTAMICIN PEAK SERPL-MCNC: 1.4 UG/ML — LOW (ref 5–10)
GENTAMICIN SERPL-MCNC: 1.3 UG/ML — SIGNIFICANT CHANGE UP
GLUCOSE SERPL-MCNC: 105 MG/DL — HIGH (ref 70–99)
HCT VFR BLD CALC: 23.6 % — LOW (ref 39–50)
HGB BLD-MCNC: 7.5 G/DL — LOW (ref 13–17)
MAGNESIUM SERPL-MCNC: 2.4 MG/DL — SIGNIFICANT CHANGE UP (ref 1.6–2.6)
MCHC RBC-ENTMCNC: 29.4 PG — SIGNIFICANT CHANGE UP (ref 27–34)
MCHC RBC-ENTMCNC: 31.8 GM/DL — LOW (ref 32–36)
MCV RBC AUTO: 92.5 FL — SIGNIFICANT CHANGE UP (ref 80–100)
NRBC # BLD: 0 /100 WBCS — SIGNIFICANT CHANGE UP (ref 0–0)
PHOSPHATE SERPL-MCNC: 3.8 MG/DL — SIGNIFICANT CHANGE UP (ref 2.5–4.5)
PLATELET # BLD AUTO: 694 K/UL — HIGH (ref 150–400)
POTASSIUM SERPL-MCNC: 3.5 MMOL/L — SIGNIFICANT CHANGE UP (ref 3.5–5.3)
POTASSIUM SERPL-SCNC: 3.5 MMOL/L — SIGNIFICANT CHANGE UP (ref 3.5–5.3)
RBC # BLD: 2.55 M/UL — LOW (ref 4.2–5.8)
RBC # FLD: 21.2 % — HIGH (ref 10.3–14.5)
SODIUM SERPL-SCNC: 143 MMOL/L — SIGNIFICANT CHANGE UP (ref 135–145)
SPECIMEN SOURCE: SIGNIFICANT CHANGE UP
WBC # BLD: 14.92 K/UL — HIGH (ref 3.8–10.5)
WBC # FLD AUTO: 14.92 K/UL — HIGH (ref 3.8–10.5)

## 2019-06-02 PROCEDURE — 99233 SBSQ HOSP IP/OBS HIGH 50: CPT

## 2019-06-02 RX ORDER — SOD SULF/SODIUM/NAHCO3/KCL/PEG
1000 SOLUTION, RECONSTITUTED, ORAL ORAL
Refills: 0 | Status: DISCONTINUED | OUTPATIENT
Start: 2019-06-02 | End: 2019-06-02

## 2019-06-02 RX ORDER — SOD SULF/SODIUM/NAHCO3/KCL/PEG
1000 SOLUTION, RECONSTITUTED, ORAL ORAL
Refills: 0 | Status: COMPLETED | OUTPATIENT
Start: 2019-06-02 | End: 2019-06-02

## 2019-06-02 RX ADMIN — NAFCILLIN 200 GRAM(S): 10 INJECTION, POWDER, FOR SOLUTION INTRAVENOUS at 09:46

## 2019-06-02 RX ADMIN — MORPHINE SULFATE 2 MILLIGRAM(S): 50 CAPSULE, EXTENDED RELEASE ORAL at 03:25

## 2019-06-02 RX ADMIN — OXYCODONE AND ACETAMINOPHEN 2 TABLET(S): 5; 325 TABLET ORAL at 21:30

## 2019-06-02 RX ADMIN — HEPARIN SODIUM 5000 UNIT(S): 5000 INJECTION INTRAVENOUS; SUBCUTANEOUS at 06:05

## 2019-06-02 RX ADMIN — Medication 1000 MILLILITER(S): at 22:45

## 2019-06-02 RX ADMIN — MORPHINE SULFATE 2 MILLIGRAM(S): 50 CAPSULE, EXTENDED RELEASE ORAL at 18:01

## 2019-06-02 RX ADMIN — NAFCILLIN 200 GRAM(S): 10 INJECTION, POWDER, FOR SOLUTION INTRAVENOUS at 18:04

## 2019-06-02 RX ADMIN — ONDANSETRON 4 MILLIGRAM(S): 8 TABLET, FILM COATED ORAL at 23:28

## 2019-06-02 RX ADMIN — OXYCODONE AND ACETAMINOPHEN 2 TABLET(S): 5; 325 TABLET ORAL at 20:30

## 2019-06-02 RX ADMIN — NAFCILLIN 200 GRAM(S): 10 INJECTION, POWDER, FOR SOLUTION INTRAVENOUS at 06:05

## 2019-06-02 RX ADMIN — Medication 1 GRAM(S): at 23:25

## 2019-06-02 RX ADMIN — NAFCILLIN 200 GRAM(S): 10 INJECTION, POWDER, FOR SOLUTION INTRAVENOUS at 13:01

## 2019-06-02 RX ADMIN — PANTOPRAZOLE SODIUM 40 MILLIGRAM(S): 20 TABLET, DELAYED RELEASE ORAL at 17:04

## 2019-06-02 RX ADMIN — Medication 100 MILLIGRAM(S): at 13:01

## 2019-06-02 RX ADMIN — DEXTROSE MONOHYDRATE, SODIUM CHLORIDE, AND POTASSIUM CHLORIDE 75 MILLILITER(S): 50; .745; 4.5 INJECTION, SOLUTION INTRAVENOUS at 22:45

## 2019-06-02 RX ADMIN — MORPHINE SULFATE 2 MILLIGRAM(S): 50 CAPSULE, EXTENDED RELEASE ORAL at 03:09

## 2019-06-02 RX ADMIN — HEPARIN SODIUM 5000 UNIT(S): 5000 INJECTION INTRAVENOUS; SUBCUTANEOUS at 17:04

## 2019-06-02 RX ADMIN — MORPHINE SULFATE 2 MILLIGRAM(S): 50 CAPSULE, EXTENDED RELEASE ORAL at 23:25

## 2019-06-02 RX ADMIN — MORPHINE SULFATE 2 MILLIGRAM(S): 50 CAPSULE, EXTENDED RELEASE ORAL at 23:40

## 2019-06-02 RX ADMIN — PANTOPRAZOLE SODIUM 40 MILLIGRAM(S): 20 TABLET, DELAYED RELEASE ORAL at 06:05

## 2019-06-02 RX ADMIN — NAFCILLIN 200 GRAM(S): 10 INJECTION, POWDER, FOR SOLUTION INTRAVENOUS at 02:35

## 2019-06-02 RX ADMIN — NAFCILLIN 200 GRAM(S): 10 INJECTION, POWDER, FOR SOLUTION INTRAVENOUS at 22:45

## 2019-06-02 RX ADMIN — Medication 100 MILLIGRAM(S): at 06:05

## 2019-06-02 RX ADMIN — Medication 1000 MILLILITER(S): at 14:58

## 2019-06-02 RX ADMIN — Medication 100 MILLIGRAM(S): at 22:45

## 2019-06-02 RX ADMIN — MORPHINE SULFATE 2 MILLIGRAM(S): 50 CAPSULE, EXTENDED RELEASE ORAL at 19:15

## 2019-06-02 NOTE — CONSULT NOTE ADULT - SUBJECTIVE AND OBJECTIVE BOX
Chief Complaint:  Patient is a 58y old  Male who presents with a chief complaint of Abdominal pain, vomiting (2019 09:18)      HPI:  57 y/o male PMHx ETOH abuse, chronic pancreatitis, treated hepatitis C, gout c/o severe lower abdominal pain x 3 days. Pain worsened today. Pain is associated with nausea and multiple episodes of clear vomiting. Patient took 2 aspirins which did not relieve the pain. Last BM was 3 days ago. Last flatus was today. Patient denies fever, chills, constipation, diarrhea, hematuria, melena, hematochezia, chest pain, shortness of breath, dizziness. Patient denies prior incident.   Patient is now POD#17 s/p ex-lap, yusef patch repair, post op course complicated by intraabdominal collection s/p IR perc drainage on , MSSA bacteremia and gentamycin toxicity, YUNIEL, acute on chronic anemia (stable and asymptomatic). Called to evaluate for focal thickening at hepatic flexure on imaging    PMH/PSH:PAST MEDICAL & SURGICAL HISTORY:  Gout  Hepatitis C: treated  Pancreatitis  ETOH abuse  No significant past surgical history      Allergies:  No Known Allergies      Medications:  chlorhexidine 4% Liquid 1 Application(s) Topical <User Schedule>  dextrose 5% + sodium chloride 0.45% with potassium chloride 20 mEq/L 1000 milliLiter(s) IV Continuous <Continuous>  heparin  Injectable 5000 Unit(s) SubCutaneous every 12 hours  metroNIDAZOLE  IVPB 500 milliGRAM(s) IV Intermittent every 8 hours  morphine  - Injectable 2 milliGRAM(s) IV Push every 4 hours PRN  nafcillin  IVPB 2 Gram(s) IV Intermittent every 4 hours  nafcillin  IVPB      ondansetron Injectable 4 milliGRAM(s) IV Push every 6 hours PRN  oxyCODONE    5 mG/acetaminophen 325 mG 2 Tablet(s) Oral every 4 hours PRN  pantoprazole  Injectable 40 milliGRAM(s) IV Push every 12 hours  polyethylene glycol/electrolyte Solution 1000 milliLiter(s) Oral two times a day  sucralfate 1 Gram(s) Oral every 6 hours      REVIEW OF SYSTEMS:    CONSTITUTIONAL: No weakness, fevers or chills  EYES/ENT: No visual changes;  No vertigo or throat pain   NECK: No pain or stiffness  RESPIRATORY: No cough, wheezing, hemoptysis; No shortness of breath  CARDIOVASCULAR: No chest pain or palpitations  GASTROINTESTINAL: No abdominal or epigastric pain. No nausea, vomiting, or hematemesis; No diarrhea or constipation. No melena or hematochezia.  GENITOURINARY: No dysuria, frequency or hematuria  NEUROLOGICAL: No numbness or weakness  SKIN: No itching, burning, rashes, or lesions   All other review of systems is negative unless indicated above.  Relevant Family History:   FAMILY HISTORY:  No pertinent family history in first degree relatives      Relevant Social History:  Denies ETOH or tobacco history    Physical Exam:    Vital Signs:  Vital Signs Last 24 Hrs  T(C): 36.1 (2019 11:27), Max: 36.6 (2019 18:17)  T(F): 97 (:), Max: 97.9 (2019 18:17)  HR: 80 (:) (79 - 80)  BP: 150/66 (2019 11:) (145/78 - 157/85)  BP(mean): --  RR: 15 (2019 11:) (15 - 18)  SpO2: 98% (:) (96% - 99%)  Daily     Daily Weight in k.9 (2019 06:00)    Constitutional: WDWN resting comfortably in bed; NAD  HEENT: NC/AT, PERRL, EOMI, anicteric sclera, no nasal discharge; uvula midline, no oropharyngeal erythema or exudates  Neck: supple; no JVD or thyromegaly  Respiratory: CTA B/L; no W/R/R, no retractions  Cardiac: +S1/S2; RRR; no M/R/G; PMI non-displaced  Gastrointestinal: soft, NT/ND; no rebound or guarding; +BS   Extremities: , no clubbing or cyanosis; no peripheral edema  Musculoskeletal:  no joint swelling, tenderness or erythema  Vascular: 2+ radial, femoral, DP/PT pulses B/L  Skin: warm, dry and intact; no rashes, wounds, or scars  Neurologic: AAOx3; CNS grossly intact; no focal deficits no asterixis, no tremor, no encephalopathy    Laboratory:                          7.5    14.92 )-----------( 694      ( 2019 06:43 )             23.6     06-02    143  |  115<H>  |  16  ----------------------------<  105<H>  3.5   |  19<L>  |  3.16<H>    Ca    7.8<L>      2019 06:43  Phos  3.8       Mg     2.4                     Intake and Output    19 @ 07:01  -  19 @ 07:00  --------------------------------------------------------  IN: 1490 mL / OUT: 5 mL / NET: 1485 mL        Imaging: Chief Complaint:  Patient is a 58y old  Male who presents with a chief complaint of Abdominal pain, vomiting (2019 09:18)      HPI:  59 y/o male PMHx ETOH abuse, chronic pancreatitis, treated hepatitis C, gout c/o severe lower abdominal pain x 3 days. Pain worsened today. Pain is associated with nausea and multiple episodes of clear vomiting. Patient took 2 aspirins which did not relieve the pain. Last BM was 3 days ago. Last flatus was today. Patient denies fever, chills, constipation, diarrhea, hematuria, melena, hematochezia, chest pain, shortness of breath, dizziness. Patient denies prior incident.   Patient is now POD#17 s/p ex-lap, yusef patch repair, post op course complicated by intraabdominal collection s/p IR perc drainage on , MSSA bacteremia and gentamycin toxicity, YUNIEL, acute on chronic anemia (stable and asymptomatic). Called to evaluate for focal thickening at hepatic flexure on imaging    PMH/PSH:PAST MEDICAL & SURGICAL HISTORY:  Gout  Hepatitis C: treated  Pancreatitis  ETOH abuse  No significant past surgical history      Allergies:  No Known Allergies      Medications:  chlorhexidine 4% Liquid 1 Application(s) Topical <User Schedule>  dextrose 5% + sodium chloride 0.45% with potassium chloride 20 mEq/L 1000 milliLiter(s) IV Continuous <Continuous>  heparin  Injectable 5000 Unit(s) SubCutaneous every 12 hours  metroNIDAZOLE  IVPB 500 milliGRAM(s) IV Intermittent every 8 hours  morphine  - Injectable 2 milliGRAM(s) IV Push every 4 hours PRN  nafcillin  IVPB 2 Gram(s) IV Intermittent every 4 hours  nafcillin  IVPB      ondansetron Injectable 4 milliGRAM(s) IV Push every 6 hours PRN  oxyCODONE    5 mG/acetaminophen 325 mG 2 Tablet(s) Oral every 4 hours PRN  pantoprazole  Injectable 40 milliGRAM(s) IV Push every 12 hours  polyethylene glycol/electrolyte Solution 1000 milliLiter(s) Oral two times a day  sucralfate 1 Gram(s) Oral every 6 hours      REVIEW OF SYSTEMS:  All other review of systems is negative unless indicated above.    Relevant Family History:   FAMILY HISTORY:  No pertinent family history in first degree relatives      Relevant Social History:  Denies ETOH or tobacco history    Physical Exam:    Vital Signs:  Vital Signs Last 24 Hrs  T(C): 36.1 (2019 11:27), Max: 36.6 (2019 18:17)  T(F): 97 (2019 11:27), Max: 97.9 (2019 18:17)  HR: 80 (:) (79 - 80)  BP: 150/66 (2019 11:27) (145/78 - 157/85)  BP(mean): --  RR: 15 (2019 11:) (15 - 18)  SpO2: 98% (:) (96% - 99%)  Daily     Daily Weight in k.9 (2019 06:00)    Constitutional: WDWN resting comfortably in bed; NAD  HEENT: NC/AT, PERRL, EOMI, anicteric sclera, no nasal discharge; uvula midline, no oropharyngeal erythema or exudates  Neck: supple; no JVD or thyromegaly  Respiratory: CTA B/L; no W/R/R, no retractions  Cardiac: +S1/S2; RRR; no M/R/G; PMI non-displaced  Gastrointestinal: soft, NT/ND; no rebound or guarding; +BS, IR drain, Midline healing surgical scar  Extremities: , no clubbing or cyanosis; no peripheral edema  Musculoskeletal:  no joint swelling, tenderness or erythema  Vascular: 2+ radial, femoral, DP/PT pulses B/L  Skin: warm, dry and intact; no rashes, wounds, or scars  Neurologic: AAOx3; CNS grossly intact; no focal deficits no asterixis, no tremor, no encephalopathy    Laboratory:                          7.5    14.92 )-----------( 694      ( 2019 06:43 )             23.6     06-02    143  |  115<H>  |  16  ----------------------------<  105<H>  3.5   |  19<L>  |  3.16<H>    Ca    7.8<L>      2019 06:43  Phos  3.8     06-02  Mg     2.4     06      Intake and Output    19 @ 07:01  -  -19 @ 07:00  --------------------------------------------------------  IN: 1490 mL / OUT: 5 mL / NET: 1485 mL        Imaging:    < from: CT Abdomen and Pelvis w/ Oral Cont (19 @ 15:53) >    EXAM:  CT ABDOMEN AND PELVIS OC                            PROCEDURE DATE:  2019          INTERPRETATION:  CLINICAL INDICATION: 58 years  Male with leukocytosis,   post op, purulent peritonitis  r/o colitis, abscess.     COMPARISON: 2019    PROCEDURE:   CT of the Abdomen and Pelvis was performed without intravenous contrast.   Intravenous contrast: None.  Oral contrast: positive contrast was administered.  Sagittal and coronal reformats were performed.    Limitations: Evaluation of the solid organs is limited without IV   contrast. The patient's arms overlying the abdomen create beam hardening   artifact degrading images.    FINDINGS:    LOWER CHEST:   Moderate bilateral pleural effusions with underlying compressive   atelectasisof both lower lobes. There is an NG tube with its tip   terminating just below the GE junction. Mild upper lobe discoid   atelectasis. Trace pericardial effusion.    LIVER: Within normal limits.  BILE DUCTS: Normal caliber.  GALLBLADDER: Within normal limits.  SPLEEN: Within normal limits.  PANCREAS: Multiple coarse calcifications secondary to chronic   pancreatitis.  ADRENALS: Within normal limits.  KIDNEYS/URETERS: No hydronephrosis or calculi. The left kidney is again   larger than the right. Mild nonspecific perinephric inflammatory   stranding.    BLADDER: Trace air in the bladder.  REPRODUCTIVE ORGANS: The prostate gland is unremarkable.    BOWEL: The gastric wall is markedly thickened. Retrogastric collection   measuring 4.6 x 2.8 cm on series 2 image 44, residua of the collection   seen on 2019. Cannot rule out residual abscess versus postoperative   seroma. There is infiltration of the mesenteric fat diffusely. Mild small   bowel dilatation distention measuring up to 3 cm incaliber. Contrast in   the rectum is present from prior CT. There is rectal fecal impaction with   rectum measuring up to 6.8 cm in caliber. The appendix is not visualized   and cannot be assessed.  PERITONEUM: Developing right loculated fluid collection lateral to the   liver compressing the right hepatic lobe measuring 14.9 x 3.8 x 11.8 cm.   The collection extends around the tip of the liver and medial to the   right hepatic lobe. Pneumoperitoneum anterior to the liver. Drainage   catheter viaa right lateral abdominal approach with its tip in the left   upper quadrant. Second drainage catheter via a left mid anterior   abdominal approach with its tip in the left subphrenic space. There are   drainage catheter via a left mid abdominal approach with its tip in the   upper midabdomen anteriorly. Minimal free ascites.  VESSELS:  Within normal limits.  RETROPERITONEUM: No lymphadenopathy.    ABDOMINAL WALL: There is diffuse subcutaneous edema.  BONES: Within normal limits.    IMPRESSION:     Marked gastric wall thickening. 4.6 x 2.6 cm retrogastric collection,   residual of the collection seen previously. Cannot rule out abscess   versus postoperative seroma.     Loculated right lateral perihepatic versus subcapsular hepatic collection  with mass effect on the hepatic parenchyma.    New moderate bilateral pleural effusions with underlying compressive   atelectasis of the lower lobes.    NG tube tip just past the GE junction.    Postoperative pneumoperitoneum. 3 drainage catheters as above.    Ileus versus early or partial obstruction.    Rectal fecal impaction.    Air in the urinary bladder likely iatrogenic. Correlate clinically.    CRITICAL VALUE: I discussed the major findings in this report with Dr. Ana Gonsales at 2019 4:45 PM with readback. Critical value policy of   the hospital was followed. Read back and confirmation of receipt of this   communication was  performed. This verbal communication supplements the text report of this   document.        HAVEN WU M.D., ATTENDING RADIOLOGIST  This document has been electronically signed. May 20 2019  4:56PM

## 2019-06-02 NOTE — PROGRESS NOTE ADULT - ASSESSMENT
59 yo M w/ history of gout, ETOH abuse, chronic pancreatitis, hep C p/w YUNIEL & sepsis POA from gastric perforation and purulent peritonitis & had ex-lap, yusef patch repair, peritoneal lavage on 5/16.     Purulent peritonitis & gastric perforation w/ sepsis POA and persistent MSSA bacteremia  - leukocytosis improving  - retrogastric collection and/or lateral perihepatic/subcapsular collection drained by IR on 5/22 (250cc drained)  - OR & blood cultures grew MSSA, NGTD on repeat blood cx from 5/28  - on nafcillin and flagyl as per ID   - TTE on 5/21 showed EF 60-65% w/ no evidence of valvular vegetation   - ISAIAS not done, still pending  - MRI of thoracolumbar spine was unremarkable & did not show diskitis /OM, Cervical spine MRI only showed chronic degenerative changes of C3-C6, with multilevel sac effacement   - CT showed a focal thickening at the hepatic flexure, GI will do colonoscopy tomorrow to evaluate for possible hepatic flexure mass  - CEA pending    Hypokalemia  - replaced    gastric perforation status post ex-lap, yusef patch repair, peritoneal lavage on 5/16  - status post extubation on 5/17  - UGIS showed no leak or extravasation on 5/23  - transferred out of ICU on 5/23  - care as per surgery  - pain meds as per surgery    YUNIEL  - initially due to ATN, now renal suspects Gentamicin nephrotoxicity, AIN, septic ATN  - Gent level 1.4 today  - renal following    Post operative anemia on chronic anemia  - received 3 units on 5/16  -  patient is being given 1 unit of blood today     Prophylaxis:  DVT: heparin   GI: Carafate and Protonix

## 2019-06-02 NOTE — CONSULT NOTE ADULT - ASSESSMENT
59 y/o male PMHx ETOH abuse, chronic pancreatitis, treated hepatitis C, gout, NOW POD#17 s/p ex-lap, yusef patch repair, post op course complicated by intraabdominal collection s/p IR perc drainage on 5/22, MSSA bacteremia Called to evaluate for focal thickening at hepatic flexure on imaging, Anemia

## 2019-06-02 NOTE — PROGRESS NOTE ADULT - SUBJECTIVE AND OBJECTIVE BOX
Subjective: persistent abd pain.       MEDICATIONS  (STANDING):  chlorhexidine 4% Liquid 1 Application(s) Topical <User Schedule>  dextrose 5% + sodium chloride 0.45% with potassium chloride 20 mEq/L 1000 milliLiter(s) (75 mL/Hr) IV Continuous <Continuous>  heparin  Injectable 5000 Unit(s) SubCutaneous every 12 hours  metroNIDAZOLE  IVPB 500 milliGRAM(s) IV Intermittent every 8 hours  nafcillin  IVPB 2 Gram(s) IV Intermittent every 4 hours  nafcillin  IVPB      pantoprazole  Injectable 40 milliGRAM(s) IV Push every 12 hours  sucralfate 1 Gram(s) Oral every 6 hours    MEDICATIONS  (PRN):  morphine  - Injectable 2 milliGRAM(s) IV Push every 4 hours PRN Severe Pain (7 - 10)  ondansetron Injectable 4 milliGRAM(s) IV Push every 6 hours PRN Nausea and/or Vomiting  oxyCODONE    5 mG/acetaminophen 325 mG 2 Tablet(s) Oral every 4 hours PRN Moderate Pain (4 - 6)          T(C): 36.6 (06-02-19 @ 05:44), Max: 36.6 (06-01-19 @ 18:17)  HR: 80 (06-02-19 @ 05:44) (77 - 80)  BP: 157/85 (06-02-19 @ 05:44) (141/83 - 157/85)  RR: 18 (06-02-19 @ 05:44) (16 - 18)  SpO2: 99% (06-02-19 @ 05:44) (96% - 99%)  Wt(kg): --        I&O's Detail    01 Jun 2019 07:01  -  02 Jun 2019 07:00  --------------------------------------------------------  IN:    dextrose 5% + sodium chloride 0.45% with potassium chloride 20 mEq/L: 900 mL    Oral Fluid: 240 mL    Solution: 150 mL    Solution: 200 mL  Total IN: 1490 mL    OUT:    Drain: 5 mL  Total OUT: 5 mL    Total NET: 1485 mL               PHYSICAL EXAM:    GENERAL: comfortable.   EYES: EOMI, PERRLA, conjunctiva and sclera clear  NECK: Supple, no inc in JVP  CHEST/LUNG: Clear  HEART: S1S2  ABDOMEN: Soft, midline scar post recent Sx. RUQ drain.   EXTREMITIES:  trace edema  NEURO: no asterixis        LABS:  CBC Full  -  ( 02 Jun 2019 06:43 )  WBC Count : 14.92 K/uL  RBC Count : 2.55 M/uL  Hemoglobin : 7.5 g/dL  Hematocrit : 23.6 %  Platelet Count - Automated : 694 K/uL  Mean Cell Volume : 92.5 fl  Mean Cell Hemoglobin : 29.4 pg  Mean Cell Hemoglobin Concentration : 31.8 gm/dL  Auto Neutrophil # : x  Auto Lymphocyte # : x  Auto Monocyte # : x  Auto Eosinophil # : x  Auto Basophil # : x  Auto Neutrophil % : x  Auto Lymphocyte % : x  Auto Monocyte % : x  Auto Eosinophil % : x  Auto Basophil % : x    06-02    143  |  115<H>  |  16  ----------------------------<  105<H>  3.5   |  19<L>  |  3.16<H>    Ca    7.8<L>      02 Jun 2019 06:43  Phos  3.8     06-02  Mg     2.4     06-02            Impression:  * YUNIEL -- initially due to ATN. Now with a new episode of Cr rise. CT without hydro.  DDx: Gentamicin nephrotoxicity, AIN, septic ATN.   * Perforated viscus complicated by peritonitis, intra-abd collection  * S/p David patch repair  * MSSA bacteremia    Recommendations:   * Keep off Gent. Repeat random level  * Transfuse blood  * Avoid further nephrotoxins  * Follow repeat Cr in 24h

## 2019-06-02 NOTE — PROGRESS NOTE ADULT - SUBJECTIVE AND OBJECTIVE BOX
Patient seen and examined at bedside in no distress.  Eating breakfast.  No complaints offered.    Vital Signs Last 24 Hrs  T(F): 97.8 (06-02-19 @ 05:44), Max: 97.9 (06-01-19 @ 18:17)  HR: 80 (06-02-19 @ 05:44)  BP: 157/85 (06-02-19 @ 05:44)  RR: 18 (06-02-19 @ 05:44)  SpO2: 99% (06-02-19 @ 05:44)      GENERAL: Alert, oriented, NAD  CHEST/LUNG: Clear to auscultation bilaterally, respirations nonlabored  HEART: S1S2, RRR  ABDOMEN: + Bowel sounds. Dressings over old CINDY sites c/d/i, removed, incisions clean and dry, new dressing applied. RUQ IR drain in place c serous output. Soft, nondistended, mildly TTP RLQ, no guarding/no rigidity.   EXTREMITIES: No calf tenderness b/l     LABS:                        7.5    14.92 )-----------( 694      ( 02 Jun 2019 06:43 )             23.6     06-02    143  |  115<H>  |  16  ----------------------------<  105<H>  3.5   |  19<L>  |  3.16<H>    Ca    7.8<L>      02 Jun 2019 06:43  Phos  3.8     06-02  Mg     2.4     06-02    A/P: 58M PMH ETOH abuse, pancreatitis, hep C a/w gastric perforation POD#17 s/p ex-lap, yusef patch repair, peritoneal lavage  post op course complicated by intraabdominal collection s/p IR perc drainage on 5/22, MSSA bacteremia and gentamycin toxicity, YUNIEL, acute on chronic anemia (stable and asymptomatic). Also found to have focal thickening at hepatic flexure on imaging.   Plan:  - continue diet as tolerated  - post op care; DVT ppx, GI ppx, pain management PRN, ambulate with PT, IS  - per Dr. Murray, will transfuse 1uPRBC now, f/u H/H, in prep for ISAIAS  - GI consulted for possible hepatic flexure mass  - continue medical comanagement

## 2019-06-02 NOTE — PROGRESS NOTE ADULT - SUBJECTIVE AND OBJECTIVE BOX
59 yo M w/ history of gout, ETOH abuse, chronic pancreatitis, hep C p/w YUNIEL & sepsis POA from gastric perforation and purulent peritonitis & had ex-lap, yusef patch repair, peritoneal lavage on 5/16. He is lying in bed in NAD.    MEDICATIONS  (STANDING):  chlorhexidine 4% Liquid 1 Application(s) Topical <User Schedule>  dextrose 5% + sodium chloride 0.45% with potassium chloride 20 mEq/L 1000 milliLiter(s) (75 mL/Hr) IV Continuous <Continuous>  heparin  Injectable 5000 Unit(s) SubCutaneous every 12 hours  metroNIDAZOLE  IVPB 500 milliGRAM(s) IV Intermittent every 8 hours  nafcillin  IVPB 2 Gram(s) IV Intermittent every 4 hours  nafcillin  IVPB      pantoprazole  Injectable 40 milliGRAM(s) IV Push every 12 hours  polyethylene glycol/electrolyte Solution 1000 milliLiter(s) Oral two times a day  sucralfate 1 Gram(s) Oral every 6 hours    MEDICATIONS  (PRN):  morphine  - Injectable 2 milliGRAM(s) IV Push every 4 hours PRN Severe Pain (7 - 10)  ondansetron Injectable 4 milliGRAM(s) IV Push every 6 hours PRN Nausea and/or Vomiting  oxyCODONE    5 mG/acetaminophen 325 mG 2 Tablet(s) Oral every 4 hours PRN Moderate Pain (4 - 6)      Allergies    No Known Allergies    Intolerances        Vital Signs Last 24 Hrs  T(C): 36.1 (02 Jun 2019 13:43), Max: 36.6 (01 Jun 2019 18:17)  T(F): 97 (02 Jun 2019 13:43), Max: 97.9 (01 Jun 2019 18:17)  HR: 78 (02 Jun 2019 13:43) (78 - 80)  BP: 160/90 (02 Jun 2019 13:43) (145/78 - 160/90)  BP(mean): --  RR: 15 (02 Jun 2019 13:43) (15 - 18)  SpO2: 99% (02 Jun 2019 13:43) (96% - 99%)    PHYSICAL EXAM:  GENERAL: NAD, well-groomed, well-developed  HEAD:  Atraumatic, Normocephalic  EYES: EOMI, PERRLA   NECK: Supple, No JVD, Normal thyroid  NERVOUS SYSTEM:  Alert    CHEST/LUNG: Clear to auscultation bilaterally; No rales, rhonchi, wheezing, or rubs  HEART: Regular rate and rhythm; No murmurs, rubs, or gallops  ABDOMEN: Soft, mildly tender, Nondistended; Bowel sounds present, surgical scar   EXTREMITIES: B/l LE and RUE edema    LABS:                        7.5    14.92 )-----------( 694      ( 02 Jun 2019 06:43 )             23.6     06-02    143  |  115<H>  |  16  ----------------------------<  105<H>  3.5   |  19<L>  |  3.16<H>    Ca    7.8<L>      02 Jun 2019 06:43  Phos  3.8     06-02  Mg     2.4     06-02          CAPILLARY BLOOD GLUCOSE      POCT Blood Glucose.: 81 mg/dL (02 Jun 2019 15:37)  POCT Blood Glucose.: 91 mg/dL (02 Jun 2019 10:37)  POCT Blood Glucose.: 105 mg/dL (02 Jun 2019 07:11)  POCT Blood Glucose.: 97 mg/dL (01 Jun 2019 22:03)      Culture - Blood (collected 28 May 2019 19:17)  Source: .Blood  Preliminary Report (29 May 2019 20:01):    No growth to date.      RADIOLOGY & ADDITIONAL TESTS:    06-01-19 @ 07:01  -  06-02-19 @ 07:00  --------------------------------------------------------  IN:    dextrose 5% + sodium chloride 0.45% with potassium chloride 20 mEq/L: 900 mL    Oral Fluid: 240 mL    Solution: 150 mL    Solution: 200 mL  Total IN: 1490 mL    OUT:    Drain: 5 mL  Total OUT: 5 mL    Total NET: 1485 mL

## 2019-06-02 NOTE — PROGRESS NOTE ADULT - SUBJECTIVE AND OBJECTIVE BOX
59 y/o male PMHx ETOH abuse, chronic pancreatitis, treated hepatitis C, gout c/o severe lower abdominal pain x 3 days. Pain worsened today. Pain is associated with nausea and multiple episodes of clear vomiting. Patient took 2 aspirins which did not relieve the pain. Last BM was 3 days ago. Last flatus was today. Patient denies fever, chills, constipation, diarrhea, hematuria, melena, hematochezia, chest pain, shortness of breath, dizziness. Patient denies prior incident. (16 May 2019 02:18)      Chief Complaint:  Patient is a 58y old  Male who presents with a chief complaint of Abdominal pain, vomiting (28 May 2019 11:54)      Review of Systems:    General:  No wt loss, fevers, chills, night sweats  Eyes:  Good vision, no reported pain  ENT:  No sore throat, pain, runny nose, dysphagia  CV:  No pain, palpitations, hypo/hypertension  Resp:  No dyspnea, cough, tachypnea, wheezing           Social History/Family History  SOCHX:   tobacco,  -  alcohol    FMHX: FA/MO  - contributory       Discussed with:  PMD, Family    Physical Exam:    Vital Signs:  Vital Signs Last 24 Hrs  T(C): 37.4 (28 May 2019 11:34), Max: 37.4 (28 May 2019 11:34)  T(F): 99.3 (28 May 2019 11:34), Max: 99.3 (28 May 2019 11:34)  HR: 91 (28 May 2019 11:34) (78 - 98)  BP: 148/83 (28 May 2019 11:34) (132/73 - 171/70)  BP(mean): --  RR: 17 (28 May 2019 11:34) (16 - 17)  SpO2: 97% (28 May 2019 11:34) (95% - 97%)  Daily     Daily Weight in k.6 (28 May 2019 05:26)  I&O's Summary    27 May 2019 07:01  -  28 May 2019 07:00  --------------------------------------------------------  IN: 850 mL / OUT: 1112 mL / NET: -262 mL       conjunctivae clear, no thyromegaly, nodules, adenopathy, no JVD  Chest:  Full & symmetric excursion, no increased effort, breath sounds clear  Cardiovascular:  Regular rhythm, S1, S2, no murmur/rub/S3/S4, no carotid/femoral/abdominal bruit, radial/pedal pulses 2+,  edema  Abdomen:  Soft, non-tender, non-distended, normoactive bowel sounds, no HSM      Laboratory:                          7.3    22.75 )-----------( 645      ( 28 May 2019 06:57 )             22.3     05-28    150<H>  |  121<H>  |  18  ----------------------------<  83  3.5   |  20<L>  |  1.90<H>    Ca    7.6<L>      28 May 2019 06:57  Phos  3.7     -  Mg     1.5     -            CAPILLARY BLOOD GLUCOSE      POCT Blood Glucose.: 82 mg/dL (28 May 2019 10:41)  POCT Blood Glucose.: 91 mg/dL (28 May 2019 07:19)  POCT Blood Glucose.: 73 mg/dL (27 May 2019 22:11)  POCT Blood Glucose.: 77 mg/dL (27 May 2019 15:41)            Assessment:  Bacteremia  Long history and hospital course noted and reviewed  ID noted  TTE noted  Last BC now negative, 5 days  Remains  afebrile  ISAIAS cancelled due to H/H drop, re-schedule for Monday or Tuesday

## 2019-06-02 NOTE — CONSULT NOTE ADULT - PROBLEM SELECTOR RECOMMENDATION 9
-Will d/w IR attending  -Pt is NPO  -f/u blood cultures
Check CEA,  Risks, benefits and alternatives discussed at length with patient  and informed consent obtained for COLONOSCOPY. All questions were answered.   Clear liquid diet, NPO after midnight  Begin Moviprep today, Scheduled for Colonoscopy tomorrow
Acute bacterial peritonitis sec to perforated stomach wall s/p yusef patch repair   OR c/s MSSA and blood c/s MSSA   leukocytosis worsening ,no diarrhea fever or shock   change coverage to Nafcillin while affectively covering MSSA and empiric coverage of gram neg and anaerobes  repeat blood c/s in am   will need TTE for IE eval

## 2019-06-02 NOTE — CONSULT NOTE ADULT - REASON FOR ADMISSION
Abdominal pain, vomiting
perforated viscus
Abdominal pain, vomiting
Abdominal pain, vomiting

## 2019-06-02 NOTE — CONSULT NOTE ADULT - PROBLEM SELECTOR PROBLEM 1
Leukocytosis, unspecified type
Abnormal CT scan, colon
Bacteremia due to methicillin susceptible Staphylococcus aureus (MSSA)

## 2019-06-03 LAB
ANION GAP SERPL CALC-SCNC: 11 MMOL/L — SIGNIFICANT CHANGE UP (ref 5–17)
BUN SERPL-MCNC: 16 MG/DL — SIGNIFICANT CHANGE UP (ref 7–23)
CALCIUM SERPL-MCNC: 7.7 MG/DL — LOW (ref 8.5–10.1)
CEA SERPL-MCNC: 4.2 NG/ML — HIGH (ref 0–3.8)
CHLORIDE SERPL-SCNC: 117 MMOL/L — HIGH (ref 96–108)
CO2 SERPL-SCNC: 16 MMOL/L — LOW (ref 22–31)
CREAT SERPL-MCNC: 3.96 MG/DL — HIGH (ref 0.5–1.3)
GLUCOSE SERPL-MCNC: 92 MG/DL — SIGNIFICANT CHANGE UP (ref 70–99)
HCT VFR BLD CALC: 28.6 % — LOW (ref 39–50)
HGB BLD-MCNC: 9.1 G/DL — LOW (ref 13–17)
MCHC RBC-ENTMCNC: 30.1 PG — SIGNIFICANT CHANGE UP (ref 27–34)
MCHC RBC-ENTMCNC: 31.8 GM/DL — LOW (ref 32–36)
MCV RBC AUTO: 94.7 FL — SIGNIFICANT CHANGE UP (ref 80–100)
NRBC # BLD: 0 /100 WBCS — SIGNIFICANT CHANGE UP (ref 0–0)
OB PNL STL: NEGATIVE — SIGNIFICANT CHANGE UP
PLATELET # BLD AUTO: 632 K/UL — HIGH (ref 150–400)
POTASSIUM SERPL-MCNC: 3.8 MMOL/L — SIGNIFICANT CHANGE UP (ref 3.5–5.3)
POTASSIUM SERPL-SCNC: 3.8 MMOL/L — SIGNIFICANT CHANGE UP (ref 3.5–5.3)
RBC # BLD: 3.02 M/UL — LOW (ref 4.2–5.8)
RBC # FLD: 20.2 % — HIGH (ref 10.3–14.5)
SODIUM SERPL-SCNC: 144 MMOL/L — SIGNIFICANT CHANGE UP (ref 135–145)
WBC # BLD: 15.04 K/UL — HIGH (ref 3.8–10.5)
WBC # FLD AUTO: 15.04 K/UL — HIGH (ref 3.8–10.5)

## 2019-06-03 PROCEDURE — 99233 SBSQ HOSP IP/OBS HIGH 50: CPT

## 2019-06-03 PROCEDURE — 75984 XRAY CONTROL CATHETER CHANGE: CPT | Mod: 26

## 2019-06-03 PROCEDURE — 49423 EXCHANGE DRAINAGE CATHETER: CPT

## 2019-06-03 RX ORDER — MORPHINE SULFATE 50 MG/1
2 CAPSULE, EXTENDED RELEASE ORAL ONCE
Refills: 0 | Status: DISCONTINUED | OUTPATIENT
Start: 2019-06-03 | End: 2019-06-03

## 2019-06-03 RX ORDER — ONDANSETRON 8 MG/1
4 TABLET, FILM COATED ORAL ONCE
Refills: 0 | Status: DISCONTINUED | OUTPATIENT
Start: 2019-06-03 | End: 2019-06-03

## 2019-06-03 RX ORDER — OXYCODONE AND ACETAMINOPHEN 5; 325 MG/1; MG/1
2 TABLET ORAL EVERY 4 HOURS
Refills: 0 | Status: DISCONTINUED | OUTPATIENT
Start: 2019-06-03 | End: 2019-06-04

## 2019-06-03 RX ORDER — SODIUM CHLORIDE 9 MG/ML
1000 INJECTION, SOLUTION INTRAVENOUS
Refills: 0 | Status: DISCONTINUED | OUTPATIENT
Start: 2019-06-03 | End: 2019-06-03

## 2019-06-03 RX ADMIN — MORPHINE SULFATE 2 MILLIGRAM(S): 50 CAPSULE, EXTENDED RELEASE ORAL at 05:47

## 2019-06-03 RX ADMIN — CHLORHEXIDINE GLUCONATE 1 APPLICATION(S): 213 SOLUTION TOPICAL at 06:46

## 2019-06-03 RX ADMIN — OXYCODONE AND ACETAMINOPHEN 2 TABLET(S): 5; 325 TABLET ORAL at 03:31

## 2019-06-03 RX ADMIN — NAFCILLIN 200 GRAM(S): 10 INJECTION, POWDER, FOR SOLUTION INTRAVENOUS at 21:27

## 2019-06-03 RX ADMIN — NAFCILLIN 200 GRAM(S): 10 INJECTION, POWDER, FOR SOLUTION INTRAVENOUS at 05:32

## 2019-06-03 RX ADMIN — SODIUM CHLORIDE 75 MILLILITER(S): 9 INJECTION, SOLUTION INTRAVENOUS at 11:26

## 2019-06-03 RX ADMIN — MORPHINE SULFATE 2 MILLIGRAM(S): 50 CAPSULE, EXTENDED RELEASE ORAL at 14:30

## 2019-06-03 RX ADMIN — MORPHINE SULFATE 2 MILLIGRAM(S): 50 CAPSULE, EXTENDED RELEASE ORAL at 14:43

## 2019-06-03 RX ADMIN — MORPHINE SULFATE 2 MILLIGRAM(S): 50 CAPSULE, EXTENDED RELEASE ORAL at 12:15

## 2019-06-03 RX ADMIN — NAFCILLIN 200 GRAM(S): 10 INJECTION, POWDER, FOR SOLUTION INTRAVENOUS at 17:21

## 2019-06-03 RX ADMIN — NAFCILLIN 200 GRAM(S): 10 INJECTION, POWDER, FOR SOLUTION INTRAVENOUS at 09:27

## 2019-06-03 RX ADMIN — HEPARIN SODIUM 5000 UNIT(S): 5000 INJECTION INTRAVENOUS; SUBCUTANEOUS at 17:20

## 2019-06-03 RX ADMIN — MORPHINE SULFATE 2 MILLIGRAM(S): 50 CAPSULE, EXTENDED RELEASE ORAL at 05:32

## 2019-06-03 RX ADMIN — OXYCODONE AND ACETAMINOPHEN 2 TABLET(S): 5; 325 TABLET ORAL at 04:31

## 2019-06-03 RX ADMIN — PANTOPRAZOLE SODIUM 40 MILLIGRAM(S): 20 TABLET, DELAYED RELEASE ORAL at 05:32

## 2019-06-03 RX ADMIN — MORPHINE SULFATE 2 MILLIGRAM(S): 50 CAPSULE, EXTENDED RELEASE ORAL at 22:00

## 2019-06-03 RX ADMIN — Medication 100 MILLIGRAM(S): at 05:32

## 2019-06-03 RX ADMIN — MORPHINE SULFATE 2 MILLIGRAM(S): 50 CAPSULE, EXTENDED RELEASE ORAL at 11:58

## 2019-06-03 RX ADMIN — NAFCILLIN 200 GRAM(S): 10 INJECTION, POWDER, FOR SOLUTION INTRAVENOUS at 15:20

## 2019-06-03 RX ADMIN — PANTOPRAZOLE SODIUM 40 MILLIGRAM(S): 20 TABLET, DELAYED RELEASE ORAL at 17:20

## 2019-06-03 RX ADMIN — MORPHINE SULFATE 2 MILLIGRAM(S): 50 CAPSULE, EXTENDED RELEASE ORAL at 21:45

## 2019-06-03 NOTE — PROGRESS NOTE ADULT - PROBLEM SELECTOR PLAN 1
sec to stomach perforation s/p yusef patch repair   initial OR c/s and blood c/+ MSSA  on nafcillin(day 15 ) for MSSA infection   genta discontinued ,received with level for 11 days   now YUNIEL worsening so will hold off ,all c/s stayed neg for Gram neg as such  cr worsening ,will hold off on any more genta  (received for 10 days with level )  d/c flagyl (day 14 done )

## 2019-06-03 NOTE — PROGRESS NOTE ADULT - SUBJECTIVE AND OBJECTIVE BOX
Surgery NP note  Patient seen and examined bedside resting comfortably.  No complaints offered. Abdominal pain is well controlled.  Denies nausea and vomiting. NPO for colonoscopy  Normal flatus/BM.     T(F): 97.4 (06-03-19 @ 05:08), Max: 97.9 (06-02-19 @ 18:30)  HR: 80 (06-03-19 @ 05:08) (78 - 80)  BP: 156/90 (06-03-19 @ 05:08) (140/78 - 160/91)  RR: 18 (06-03-19 @ 05:08) (15 - 18)  SpO2: 97% (06-03-19 @ 05:08) (96% - 99%)  Wt(kg): --  CAPILLARY BLOOD GLUCOSE      POCT Blood Glucose.: 87 mg/dL (03 Jun 2019 06:41)  POCT Blood Glucose.: 90 mg/dL (02 Jun 2019 22:58)  POCT Blood Glucose.: 81 mg/dL (02 Jun 2019 15:37)  POCT Blood Glucose.: 91 mg/dL (02 Jun 2019 10:37)      PHYSICAL EXAM:  General: NAD, WDWN.   Neuro:  Alert & responsive  HEENT: NCAT, EOMI, conjunctiva clear  CV: +S1+S2 regular rate and rhythm  Lung: clear to ausculation bilaterally, respirations nonlabored, good inspiratory effort  Abdomen: soft, Non tender, No Distension. Normal active BS, Dressings over old CINDY sites c/d/i, RUQ IR drain in place c serous output  Extremities: no pedal edema or calf tenderness noted     LABS:                        7.5    14.92 )-----------( 694      ( 02 Jun 2019 06:43 )             23.6     06-03    144  |  117<H>  |  16  ----------------------------<  92  3.8   |  16<L>  |  3.96<H>    Ca    7.7<L>      03 Jun 2019 08:14  Phos  3.8     06-02  Mg     2.4     06-02          I&O's Detail    02 Jun 2019 07:01  -  03 Jun 2019 07:00  --------------------------------------------------------  IN:    Oral Fluid: 240 mL    Solution: 200 mL    Solution: 200 mL  Total IN: 640 mL    OUT:    Voided: 500 mL  Total OUT: 500 mL    Total NET: 140 mL        A/P: 58M PMH ETOH abuse, pancreatitis, hep C a/w gastric perforation POD#18 s/p ex-lap, yusef patch repair, peritoneal lavage  post op course complicated by intraabdominal collection s/p IR perc drainage on 5/22, MSSA bacteremia and gentamycin toxicity, YUNIEL, acute on chronic anemia (stable and asymptomatic). Also found to have focal thickening at hepatic flexure on imaging.   Plan:  - Keep NPO for Colonoscopy today per GI for abnormal CT  - post op care; DVT ppx, GI ppx, pain management PRN, ambulate with PT, IS  - ISAIAS planning   - continue medical/ID comanagement

## 2019-06-03 NOTE — PROGRESS NOTE ADULT - SUBJECTIVE AND OBJECTIVE BOX
NEPHROLOGY PROGRESS NOTE    CHIEF COMPLAINT:  YUNIEL    HPI:  Renal function worsening.  Abdominal drain was upsized today, still with pain.    ROS:    EXAM:  T(F): 98.5 (06-03-19 @ 11:50)  HR: 86 (06-03-19 @ 11:50)  BP: 163/87 (06-03-19 @ 11:50)  RR: 18 (06-03-19 @ 11:50)  SpO2: 96% (06-03-19 @ 11:50)    Conversant, in no apparent distress  Normal respiratory effort, lungs clear bilaterally  Heart RRR with no murmur, moderate peripheral edema         LABS                             9.1    15.04 )-----------( 632      ( 03 Jun 2019 09:44 )             28.6          06-03    144  |  117<H>  |  16  ----------------------------<  92  3.8   |  16<L>  |  3.96<H>    Ca    7.7<L>      03 Jun 2019 08:14  Phos  3.8     06-02  Mg     2.4     06-02      Impression:  * YUNIEL -- initially due to ATN. Now with a new episode of Cr rise. CT without hydro.  DDx: Gentamicin nephrotoxicity, AIN, septic ATN.   * Perforated viscus complicated by peritonitis, intra-abd collection  * S/p David patch repair  * MSSA bacteremia    Recommendations:   * No further gentamycin  * D/C IVF as he is getting fluid overloaded

## 2019-06-03 NOTE — PROGRESS NOTE ADULT - ASSESSMENT
59 yo M w/ history of gout, ETOH abuse, chronic pancreatitis, hep C p/w YUNIEL & sepsis POA from gastric perforation and purulent peritonitis & had ex-lap, yusef patch repair, peritoneal lavage on 5/16.     Purulent peritonitis & gastric perforation w/ sepsis POA and persistent MSSA bacteremia  - leukocytosis    - retrogastric collection and/or lateral perihepatic/subcapsular collection drained by IR on 5/22 (250cc drained)  - OR & blood cultures grew MSSA, NGTD on repeat blood cx from 5/28  - on nafcillin   per ID   - TTE on 5/21 showed EF 60-65% w/ no evidence of valvular vegetation   - ISAIAS pending   - MRI of thoracolumbar spine was unremarkable & did not show diskitis /OM, Cervical spine MRI only showed chronic degenerative changes of C3-C6, with multilevel sac effacement   - CT showed a focal thickening at the hepatic flexure,    - colonoscopy w no acute finding     Hypokalemia  - replaced    gastric perforation status post ex-lap, yusef patch repair, peritoneal lavage on 5/16  - status post extubation on 5/17  - UGIS showed no leak or extravasation on 5/23  - transferred out of ICU on 5/23       YUNIEL  - initially due to ATN, now renal suspects Gentamicin nephrotoxicity, AIN, septic ATN  - renal following, Cr Continues to trend up     Post operative anemia on chronic anemia  - received 3 units on 5/16  -  s/p 1 unit of blood 6/2    Prophylaxis:  DVT: heparin   GI: Carafate and Protonix

## 2019-06-03 NOTE — PROGRESS NOTE ADULT - ASSESSMENT
59 y/o male PMHx ETOH abuse, chronic pancreatitis, treated hepatitis C, gout, NOW POD#17 s/p ex-lap, yusef patch repair, post op course complicated by intraabdominal collection s/p IR perc drainage on 5/22, MSSA bacteremia Called to evaluate for focal thickening at hepatic flexure on imaging, Anemia  COLONOSCOPY- REDUNDANT COLO EXTRINSIC ADHESIONS, FAIR TO POOR PREP

## 2019-06-03 NOTE — PROGRESS NOTE ADULT - SUBJECTIVE AND OBJECTIVE BOX
Procedure:           Colonoscopy    Indications:         ABNORMAL CT SCAN R/O MASS    Monitored Anesthesia Care provided by : DR GUEVARA    Prep Quality : FAIR TO PREP  ____________________________________________________________________________________________________  Procedure:           Pre-Anesthesia Assessment:                       - Prior to the procedure, a History and Physical was performed, and patient                        medications and allergies were reviewed. The patient is competent. The risks                        and benefits of the procedure and the sedation options and risks were                        discussed with the patient. All questions were answered and informed consent                        was obtained. Patient identification and proposed procedure were verified by                        the physician, the nurse and the anesthesiologist in the procedure room.                        Mental Status Examination: alert and oriented. Airway Examination: normal                        oropharyngeal airway and neck mobility. Respiratory Examination: clear to                        auscultation. CV Examination: normal. Prophylactic Antibiotics: The patient                        does not require prophylactic antibiotics.                        Grade Assessment: III - A patient with severe systemic disease. After                        reviewing the risks and benefits, the patient was deemed in satisfactory                        condition to undergo the procedure. The anesthesia plan was to use monitored                        anesthesia care (MAC). Immediately prior to administration of medications,                        the patient was re-assessed for adequacy to receive sedatives. The heart                        rate, respiratory rate, oxygen saturations, blood pressure, adequacy of                        pulmonary ventilation, and response to care were monitored throughout the                        procedure. The physical status of the patient was re-assessed after the   	 	     procedure.                       After I obtained informed consent, the scope was passed under direct vision.                        Throughout the procedure, the patient's blood pressure, pulse, and oxygen                        saturations were monitored continuously. The Colonoscope was introduced                        through the anus and advanced to the terminal ileum, with identification of                        the appendiceal orifice and IC valve. The colonoscopy was performed without                        difficulty. The patient tolerated the procedure well. The quality of the                        bowel preparation was good.  Withdrawal Time :     11 MIN    RECTUM: INTERNAL  HEMORRHOIDS .     SIGMOID: NORMAL    DESCENDING COLON: NORMAL    TRANSVERSE COLON: NORMAL EXTRINSIC ADHESIONS NOTED     ASCENDING COLON: NORMAL    CECUM: NORMAL    TERMINAL ILEUM: DID NOT Enter    The patient tolerated the procedure well.

## 2019-06-03 NOTE — PROGRESS NOTE ADULT - SUBJECTIVE AND OBJECTIVE BOX
Gastroenterology  Patient seen and examined bedside resting comfortably.  No complaints offered. tolerated prep  No abdominal pain  Denies nausea and vomiting. NPO   Normal flatus/BM.     T(F): 97.9 (06-03-19 @ 09:43), Max: 97.9 (06-02-19 @ 18:30)  HR: 101 (06-03-19 @ 09:43) (78 - 101)  BP: 163/90 (06-03-19 @ 09:43) (140/78 - 163/90)  RR: 18 (06-03-19 @ 09:43) (15 - 18)  SpO2: 97% (06-03-19 @ 09:43) (96% - 99%)  Wt(kg): --  CAPILLARY BLOOD GLUCOSE      POCT Blood Glucose.: 87 mg/dL (03 Jun 2019 06:41)  POCT Blood Glucose.: 90 mg/dL (02 Jun 2019 22:58)  POCT Blood Glucose.: 81 mg/dL (02 Jun 2019 15:37)  POCT Blood Glucose.: 91 mg/dL (02 Jun 2019 10:37)      PHYSICAL EXAM:  General: NAD, WDWN.   Neuro:  Alert & responsive  HEENT: NCAT, EOMI, conjunctiva clear  CV: +S1+S2 regular rate and rhythm  Lung: clear to ausculation bilaterally, respirations nonlabored, good inspiratory effort  Abdomen: soft, Non Tender, No distention Normal active BS  Extremities: no cyanosis, clubbing or edema    LABS:                        7.5    14.92 )-----------( 694      ( 02 Jun 2019 06:43 )             23.6     06-03    144  |  117<H>  |  16  ----------------------------<  92  3.8   |  16<L>  |  3.96<H>    Ca    7.7<L>      03 Jun 2019 08:14  Phos  3.8     06-02  Mg     2.4     06-02          I&O's Detail    02 Jun 2019 07:01  -  03 Jun 2019 07:00  --------------------------------------------------------  IN:    Oral Fluid: 240 mL    Solution: 200 mL    Solution: 200 mL  Total IN: 640 mL    OUT:    Voided: 500 mL  Total OUT: 500 mL    Total NET: 140 mL        06-03 @ 08:14    144 | 117 | 16  /7.7 | -- | --  _______________________/  3.8 | 16 | 3.96                           \par

## 2019-06-03 NOTE — PROGRESS NOTE ADULT - SUBJECTIVE AND OBJECTIVE BOX
Patient is a 58y old  Male who presents with a chief complaint of Abdominal pain, vomiting (03 Jun 2019 10:37)      INTERVAL HPI/OVERNIGHT EVENTS: no events     MEDICATIONS  (STANDING):  chlorhexidine 4% Liquid 1 Application(s) Topical <User Schedule>  dextrose 5% + sodium chloride 0.45% with potassium chloride 20 mEq/L 1000 milliLiter(s) (75 mL/Hr) IV Continuous <Continuous>  heparin  Injectable 5000 Unit(s) SubCutaneous every 12 hours  nafcillin  IVPB 2 Gram(s) IV Intermittent every 4 hours  nafcillin  IVPB      pantoprazole  Injectable 40 milliGRAM(s) IV Push every 12 hours  sucralfate 1 Gram(s) Oral every 6 hours    MEDICATIONS  (PRN):  morphine  - Injectable 2 milliGRAM(s) IV Push every 4 hours PRN Severe Pain (7 - 10)  ondansetron Injectable 4 milliGRAM(s) IV Push every 6 hours PRN Nausea and/or Vomiting  oxyCODONE    5 mG/acetaminophen 325 mG 2 Tablet(s) Oral every 4 hours PRN Moderate Pain (4 - 6)      Allergies    No Known Allergies    Intolerances           Vital Signs Last 24 Hrs  T(C): 36.9 (03 Jun 2019 11:50), Max: 36.9 (03 Jun 2019 11:50)  T(F): 98.5 (03 Jun 2019 11:50), Max: 98.5 (03 Jun 2019 11:50)  HR: 86 (03 Jun 2019 11:50) (78 - 101)  BP: 163/87 (03 Jun 2019 11:50) (140/78 - 172/94)  BP(mean): --  RR: 18 (03 Jun 2019 11:50) (15 - 18)  SpO2: 96% (03 Jun 2019 11:50) (95% - 99%)    PHYSICAL EXAM:  GENERAL: NAD, well-groomed, well-developed  HEAD:  Atraumatic, Normocephalic  EYES: EOMI, PERRLA, conjunctiva and sclera clear  ENMT: No tonsillar erythema, exudates, or enlargement; Moist mucous membranes, Good dentition, No lesions  NECK: Supple, No JVD, Normal thyroid  NERVOUS SYSTEM:  Alert & Oriented X3, Good concentration; Motor Strength 5/5 B/L upper and lower extremities; DTRs 2+ intact and symmetric  CHEST/LUNG: Clear to percussion bilaterally; No rales, rhonchi, wheezing, or rubs  HEART: Regular rate and rhythm; No murmurs, rubs, or gallops  ABDOMEN: Soft, Nontender, Nondistended; Bowel sounds present  EXTREMITIES:  2+ Peripheral Pulses, No clubbing, cyanosis, or edema  LYMPH: No lymphadenopathy noted  SKIN: No rashes or lesions    LABS:                        9.1    15.04 )-----------( 632      ( 03 Jun 2019 09:44 )             28.6     06-03    144  |  117<H>  |  16  ----------------------------<  92  3.8   |  16<L>  |  3.96<H>    Ca    7.7<L>      03 Jun 2019 08:14  Phos  3.8     06-02  Mg     2.4     06-02          CAPILLARY BLOOD GLUCOSE      POCT Blood Glucose.: 94 mg/dL (03 Jun 2019 12:01)  POCT Blood Glucose.: 87 mg/dL (03 Jun 2019 06:41)  POCT Blood Glucose.: 90 mg/dL (02 Jun 2019 22:58)  POCT Blood Glucose.: 81 mg/dL (02 Jun 2019 15:37)      RADIOLOGY & ADDITIONAL TESTS:    Imaging Personally Reviewed:  [ X] YES  [ ] NO    Consultant(s) Notes Reviewed:  [ X] YES  [ ] NO    Care Discussed with Consultants/Other Providers [X ] YES  [ ] NO

## 2019-06-03 NOTE — PROGRESS NOTE ADULT - PROBLEM SELECTOR PLAN 1
Problem: Abnormal CT scan, colon. Recommendation: Check CEA,  Risks, benefits and alternatives discussed at length with patient  and informed consent obtained for COLONOSCOPY. All questions were answered.   Clear liquid diet, NPO after midnight  Begin Moviprep today, Scheduled for Colonoscopy tomorrow.

## 2019-06-03 NOTE — CHART NOTE - NSCHARTNOTEFT_GEN_A_CORE
Vascular & Interventional Radiology Post-Procedure Note    Pre-Procedure Diagnosis: RUQ abscess s/p IR drain  Post-Procedure Diagnosis: Same as pre.  Indications for Procedure: persistent fluid collection on CT    : Jeff  Assistant(s): n/a    Procedure Details/Findings: Small-moderate residual collection, tube upsized to 12F  Access (if applicable): n/a    Complications: none  Estimated Blood Loss: Minimal  Specimen: none  Contrast: 10cc  Sedation: none  Patient Condition/Disposition: stable, back to floor    Plan:   -follow up tube check and CT in 2 weeks.

## 2019-06-03 NOTE — PROGRESS NOTE ADULT - SUBJECTIVE AND OBJECTIVE BOX
Patient is a 58y old  Male who presents with a chief complaint of Abdominal pain, vomiting (03 Jun 2019 13:24)      INTERVAL HPI / OVERNIGHT EVENTS: no new c/o    MEDICATIONS  (STANDING):  chlorhexidine 4% Liquid 1 Application(s) Topical <User Schedule>  dextrose 5% + sodium chloride 0.45% with potassium chloride 20 mEq/L 1000 milliLiter(s) (75 mL/Hr) IV Continuous <Continuous>  heparin  Injectable 5000 Unit(s) SubCutaneous every 12 hours  nafcillin  IVPB 2 Gram(s) IV Intermittent every 4 hours  nafcillin  IVPB      pantoprazole  Injectable 40 milliGRAM(s) IV Push every 12 hours  sucralfate 1 Gram(s) Oral every 6 hours    MEDICATIONS  (PRN):  morphine  - Injectable 2 milliGRAM(s) IV Push every 4 hours PRN Severe Pain (7 - 10)  ondansetron Injectable 4 milliGRAM(s) IV Push every 6 hours PRN Nausea and/or Vomiting  oxyCODONE    5 mG/acetaminophen 325 mG 2 Tablet(s) Oral every 4 hours PRN Moderate Pain (4 - 6)      Vital Signs Last 24 Hrs  T(C): 36.9 (03 Jun 2019 11:50), Max: 36.9 (03 Jun 2019 11:50)  T(F): 98.5 (03 Jun 2019 11:50), Max: 98.5 (03 Jun 2019 11:50)  HR: 86 (03 Jun 2019 11:50) (79 - 101)  BP: 163/87 (03 Jun 2019 11:50) (140/78 - 172/94)  BP(mean): --  RR: 18 (03 Jun 2019 11:50) (15 - 18)  SpO2: 96% (03 Jun 2019 11:50) (95% - 98%)    Review of systems:  General : no fever /chills,fatigue  CVS : no chest pain, palpitations  Lungs : no shortness of breath, cough  GI :+ abdominal pain, no vomiting, diarrhea   : no dysuria ,hematuria        PHYSICAL EXAM:  General :NAD  Constitutional:  well-developed  Respiratory: CTAB/L  Cardiovascular: S1 and S2, RRR, no M/G/R  Gastrointestinal: BS+,s/p ex lap drains X 3   Extremities: No peripheral edema  Vascular: 2+ peripheral pulses  Skin: No rashes      LABS:                        9.1    15.04 )-----------( 632      ( 03 Jun 2019 09:44 )             28.6     06-03    144  |  117<H>  |  16  ----------------------------<  92  3.8   |  16<L>  |  3.96<H>    Ca    7.7<L>      03 Jun 2019 08:14  Phos  3.8     06-02  Mg     2.4     06-02            MICROBIOLOGY:  RECENT CULTURES:  05-28 .Blood XXXX XXXX   No growth at 5 days.          RADIOLOGY & ADDITIONAL STUDIES:

## 2019-06-03 NOTE — CHART NOTE - NSCHARTNOTEFT_GEN_A_CORE
Assessment: Pt seen for acute severe malnutrition follow up. Pt admitted with perforated viscus s/p exploratory laparotomy, s/p grham patch repair, MSSA bacteremia, UYNIEL. Hx of Hep C, ETOH abuse, ETOH related pancreatitis. PT reported pain and nausea. Poor appetite with new diet order. Pt reported being able to consume more at meal times. Discussed with pt proper nutrition to promote wound healing and overall health, pt agreed.     Factors impacting intake: [ ] none [ ] nausea  [ x] vomiting [ ] diarrhea [ ] constipation  [ ]chewing problems [ ] swallowing issues  [x ] other: abdominal pain     Diet Prescription: Diet, Regular (06-03-19 @ 10:43)    Intake: 0-50% advanced diet 06/03 RD observed 0%PO.     Current Weight: 74.6kg (06/03); 75.4 (05/27)  % Weight Change: 1% loss, (~2#) x 7 days - unable to accurately assess dry weights     Physical appearance: BMI 21.6; Upper extremities edema 2+; lower extremities edema 3+ noted     Pertinent Medications: MEDICATIONS  (STANDING):  chlorhexidine 4% Liquid 1 Application(s) Topical <User Schedule>  dextrose 5% + sodium chloride 0.45% with potassium chloride 20 mEq/L 1000 milliLiter(s) (75 mL/Hr) IV Continuous <Continuous>  heparin  Injectable 5000 Unit(s) SubCutaneous every 12 hours  nafcillin  IVPB 2 Gram(s) IV Intermittent every 4 hours  nafcillin  IVPB      pantoprazole  Injectable 40 milliGRAM(s) IV Push every 12 hours  sucralfate 1 Gram(s) Oral every 6 hours    MEDICATIONS  (PRN):  morphine  - Injectable 2 milliGRAM(s) IV Push every 4 hours PRN Severe Pain (7 - 10)  ondansetron Injectable 4 milliGRAM(s) IV Push every 6 hours PRN Nausea and/or Vomiting  oxyCODONE    5 mG/acetaminophen 325 mG 2 Tablet(s) Oral every 4 hours PRN Moderate Pain (4 - 6)    Pertinent Labs: 06-03 Na n/a   Glu n/a   K+ n/a   Cr n/a   BUN n/a   Phos n/a   Alb n/a   PAB n/a   Hgb 9.1 g/dL<L> Hct 28.6 %<L> HgA1C n/a    Glucose, Serum: 92 mg/dL   24Hr FS:94 mg/dL  87 mg/dL  90 mg/dL    Skin: Surgical incision     Estimated Needs:   [x ] no change since previous assessment (   [ ] recalculated:     Previous Nutrition Diagnosis:   Malnutrition; severe malnutrition in context of acute illness     Related to: inadequate protein-energy intake in setting of gastric perforation, s/p exploratory laparotomy, yusef patch      As evidenced by: <50% nutrition needs x 12 days, 2+ & 4+ edema , now c overall intake <50% of meals x 14 days, c improvement today for lunch meal    Nutrition Diagnosis is [ x ] ongoing  [ ] resolved  [ ] improved  [ ] not applicable     Goal: Pt to consume >75% of protein-energy needs; maintain wt +/-2# (not met)     New Nutrition Diagnosis: [ x ] not applicable       Interventions:   Recommend  [x ] Continue: Regular diet   [ ] Change Diet To:  [ x ] Nutrition Supplement: Ensure Enlive x 2/day (provides 700 kcal, 40 g protein)   [ ] Nutrition Support:  [ ] Other:     Monitoring and Evaluation:   [ x ] PO intake [ x ] Tolerance to diet prescription [ x ] weights [ x ] labs[ x ] follow up per protocol  [ ] other:

## 2019-06-03 NOTE — PROGRESS NOTE ADULT - PROBLEM SELECTOR PLAN 2
sec to above  blood c/s positive for MSSA from 5/24 and on admission   ECHO neg for  IE  will need ISAIAS to r/o IE as positive for persistent Bacteremia (day 7 blood c/s positive )   repeat blood c/s ordered  cardiology on board: awaiting ISAIAS soon   Repeat blood c/s neg now from 5/29   MRI spine : neg for diskitis/OM   cont nafcillin for now  duration to be decided soon

## 2019-06-04 LAB
ANION GAP SERPL CALC-SCNC: 10 MMOL/L — SIGNIFICANT CHANGE UP (ref 5–17)
BUN SERPL-MCNC: 19 MG/DL — SIGNIFICANT CHANGE UP (ref 7–23)
CALCIUM SERPL-MCNC: 8 MG/DL — LOW (ref 8.5–10.1)
CHLORIDE SERPL-SCNC: 115 MMOL/L — HIGH (ref 96–108)
CO2 SERPL-SCNC: 17 MMOL/L — LOW (ref 22–31)
CREAT SERPL-MCNC: 4.16 MG/DL — HIGH (ref 0.5–1.3)
GLUCOSE SERPL-MCNC: 82 MG/DL — SIGNIFICANT CHANGE UP (ref 70–99)
HCT VFR BLD CALC: 27.9 % — LOW (ref 39–50)
HGB BLD-MCNC: 9 G/DL — LOW (ref 13–17)
MCHC RBC-ENTMCNC: 30.1 PG — SIGNIFICANT CHANGE UP (ref 27–34)
MCHC RBC-ENTMCNC: 32.3 GM/DL — SIGNIFICANT CHANGE UP (ref 32–36)
MCV RBC AUTO: 93.3 FL — SIGNIFICANT CHANGE UP (ref 80–100)
NRBC # BLD: 0 /100 WBCS — SIGNIFICANT CHANGE UP (ref 0–0)
PLATELET # BLD AUTO: 643 K/UL — HIGH (ref 150–400)
POTASSIUM SERPL-MCNC: 3.7 MMOL/L — SIGNIFICANT CHANGE UP (ref 3.5–5.3)
POTASSIUM SERPL-SCNC: 3.7 MMOL/L — SIGNIFICANT CHANGE UP (ref 3.5–5.3)
RBC # BLD: 2.99 M/UL — LOW (ref 4.2–5.8)
RBC # FLD: 20.4 % — HIGH (ref 10.3–14.5)
SODIUM SERPL-SCNC: 142 MMOL/L — SIGNIFICANT CHANGE UP (ref 135–145)
WBC # BLD: 17.48 K/UL — HIGH (ref 3.8–10.5)
WBC # FLD AUTO: 17.48 K/UL — HIGH (ref 3.8–10.5)

## 2019-06-04 PROCEDURE — 99233 SBSQ HOSP IP/OBS HIGH 50: CPT

## 2019-06-04 RX ORDER — HEPARIN SODIUM 5000 [USP'U]/ML
5000 INJECTION INTRAVENOUS; SUBCUTANEOUS EVERY 12 HOURS
Refills: 0 | Status: DISCONTINUED | OUTPATIENT
Start: 2019-06-04 | End: 2019-06-21

## 2019-06-04 RX ORDER — NAFCILLIN 10 G/100ML
2 INJECTION, POWDER, FOR SOLUTION INTRAVENOUS EVERY 4 HOURS
Refills: 0 | Status: DISCONTINUED | OUTPATIENT
Start: 2019-06-04 | End: 2019-06-05

## 2019-06-04 RX ORDER — DEXTROSE 50 % IN WATER 50 %
50 SYRINGE (ML) INTRAVENOUS ONCE
Refills: 0 | Status: COMPLETED | OUTPATIENT
Start: 2019-06-04 | End: 2019-06-04

## 2019-06-04 RX ORDER — CHLORHEXIDINE GLUCONATE 213 G/1000ML
1 SOLUTION TOPICAL
Refills: 0 | Status: DISCONTINUED | OUTPATIENT
Start: 2019-06-04 | End: 2019-06-21

## 2019-06-04 RX ORDER — SODIUM CHLORIDE 9 MG/ML
1000 INJECTION, SOLUTION INTRAVENOUS
Refills: 0 | Status: DISCONTINUED | OUTPATIENT
Start: 2019-06-04 | End: 2019-06-05

## 2019-06-04 RX ORDER — ONDANSETRON 8 MG/1
4 TABLET, FILM COATED ORAL EVERY 6 HOURS
Refills: 0 | Status: DISCONTINUED | OUTPATIENT
Start: 2019-06-04 | End: 2019-06-17

## 2019-06-04 RX ORDER — SUCRALFATE 1 G
1 TABLET ORAL EVERY 6 HOURS
Refills: 0 | Status: DISCONTINUED | OUTPATIENT
Start: 2019-06-04 | End: 2019-06-21

## 2019-06-04 RX ORDER — OXYCODONE AND ACETAMINOPHEN 5; 325 MG/1; MG/1
2 TABLET ORAL EVERY 4 HOURS
Refills: 0 | Status: DISCONTINUED | OUTPATIENT
Start: 2019-06-04 | End: 2019-06-11

## 2019-06-04 RX ORDER — ONDANSETRON 8 MG/1
4 TABLET, FILM COATED ORAL EVERY 6 HOURS
Refills: 0 | Status: DISCONTINUED | OUTPATIENT
Start: 2019-06-04 | End: 2019-06-04

## 2019-06-04 RX ORDER — PANTOPRAZOLE SODIUM 20 MG/1
40 TABLET, DELAYED RELEASE ORAL EVERY 12 HOURS
Refills: 0 | Status: DISCONTINUED | OUTPATIENT
Start: 2019-06-04 | End: 2019-06-10

## 2019-06-04 RX ORDER — MORPHINE SULFATE 50 MG/1
2 CAPSULE, EXTENDED RELEASE ORAL EVERY 4 HOURS
Refills: 0 | Status: DISCONTINUED | OUTPATIENT
Start: 2019-06-04 | End: 2019-06-10

## 2019-06-04 RX ADMIN — NAFCILLIN 200 GRAM(S): 10 INJECTION, POWDER, FOR SOLUTION INTRAVENOUS at 22:17

## 2019-06-04 RX ADMIN — MORPHINE SULFATE 2 MILLIGRAM(S): 50 CAPSULE, EXTENDED RELEASE ORAL at 18:49

## 2019-06-04 RX ADMIN — MORPHINE SULFATE 2 MILLIGRAM(S): 50 CAPSULE, EXTENDED RELEASE ORAL at 23:00

## 2019-06-04 RX ADMIN — NAFCILLIN 200 GRAM(S): 10 INJECTION, POWDER, FOR SOLUTION INTRAVENOUS at 05:29

## 2019-06-04 RX ADMIN — MORPHINE SULFATE 2 MILLIGRAM(S): 50 CAPSULE, EXTENDED RELEASE ORAL at 05:45

## 2019-06-04 RX ADMIN — Medication 1 GRAM(S): at 17:09

## 2019-06-04 RX ADMIN — PANTOPRAZOLE SODIUM 40 MILLIGRAM(S): 20 TABLET, DELAYED RELEASE ORAL at 17:09

## 2019-06-04 RX ADMIN — HEPARIN SODIUM 5000 UNIT(S): 5000 INJECTION INTRAVENOUS; SUBCUTANEOUS at 05:29

## 2019-06-04 RX ADMIN — MORPHINE SULFATE 2 MILLIGRAM(S): 50 CAPSULE, EXTENDED RELEASE ORAL at 19:05

## 2019-06-04 RX ADMIN — PANTOPRAZOLE SODIUM 40 MILLIGRAM(S): 20 TABLET, DELAYED RELEASE ORAL at 05:29

## 2019-06-04 RX ADMIN — Medication 1 GRAM(S): at 22:17

## 2019-06-04 RX ADMIN — NAFCILLIN 200 GRAM(S): 10 INJECTION, POWDER, FOR SOLUTION INTRAVENOUS at 17:07

## 2019-06-04 RX ADMIN — NAFCILLIN 200 GRAM(S): 10 INJECTION, POWDER, FOR SOLUTION INTRAVENOUS at 09:51

## 2019-06-04 RX ADMIN — MORPHINE SULFATE 2 MILLIGRAM(S): 50 CAPSULE, EXTENDED RELEASE ORAL at 14:52

## 2019-06-04 RX ADMIN — MORPHINE SULFATE 2 MILLIGRAM(S): 50 CAPSULE, EXTENDED RELEASE ORAL at 05:29

## 2019-06-04 RX ADMIN — MORPHINE SULFATE 2 MILLIGRAM(S): 50 CAPSULE, EXTENDED RELEASE ORAL at 15:19

## 2019-06-04 RX ADMIN — MORPHINE SULFATE 2 MILLIGRAM(S): 50 CAPSULE, EXTENDED RELEASE ORAL at 10:49

## 2019-06-04 RX ADMIN — Medication 50 MILLILITER(S): at 08:21

## 2019-06-04 RX ADMIN — NAFCILLIN 200 GRAM(S): 10 INJECTION, POWDER, FOR SOLUTION INTRAVENOUS at 14:52

## 2019-06-04 RX ADMIN — CHLORHEXIDINE GLUCONATE 1 APPLICATION(S): 213 SOLUTION TOPICAL at 07:49

## 2019-06-04 RX ADMIN — MORPHINE SULFATE 2 MILLIGRAM(S): 50 CAPSULE, EXTENDED RELEASE ORAL at 22:41

## 2019-06-04 RX ADMIN — NAFCILLIN 200 GRAM(S): 10 INJECTION, POWDER, FOR SOLUTION INTRAVENOUS at 01:21

## 2019-06-04 RX ADMIN — HEPARIN SODIUM 5000 UNIT(S): 5000 INJECTION INTRAVENOUS; SUBCUTANEOUS at 17:08

## 2019-06-04 NOTE — PROGRESS NOTE ADULT - SUBJECTIVE AND OBJECTIVE BOX
Patient is a 58y old  Male who presents with a chief complaint of Abdominal pain, vomiting (04 Jun 2019 11:56)      INTERVAL HPI / OVERNIGHT EVENTS: doing  ok     MEDICATIONS  (STANDING):  chlorhexidine 4% Liquid 1 Application(s) Topical <User Schedule>  heparin  Injectable 5000 Unit(s) SubCutaneous every 12 hours  lactated ringers. 1000 milliLiter(s) (75 mL/Hr) IV Continuous <Continuous>  nafcillin  IVPB 2 Gram(s) IV Intermittent every 4 hours  pantoprazole  Injectable 40 milliGRAM(s) IV Push every 12 hours  sucralfate 1 Gram(s) Oral every 6 hours    MEDICATIONS  (PRN):  morphine  - Injectable 2 milliGRAM(s) IV Push every 4 hours PRN Severe Pain (7 - 10)  ondansetron Injectable 4 milliGRAM(s) IV Push every 6 hours PRN Nausea and/or Vomiting  oxyCODONE    5 mG/acetaminophen 325 mG 2 Tablet(s) Oral every 4 hours PRN Moderate Pain (4 - 6)      Vital Signs Last 24 Hrs  T(C): 35.9 (04 Jun 2019 14:44), Max: 36.6 (04 Jun 2019 05:20)  T(F): 96.6 (04 Jun 2019 14:44), Max: 97.9 (04 Jun 2019 05:20)  HR: 83 (04 Jun 2019 15:44) (81 - 98)  BP: 143/84 (04 Jun 2019 14:44) (123/74 - 159/90)  BP(mean): --  RR: 18 (04 Jun 2019 15:44) (12 - 20)  SpO2: 95% (04 Jun 2019 15:44) (95% - 100%)    Review of systems:  General : no fever /chills,fatigue  CVS : no chest pain, palpitations  Lungs : no shortness of breath, cough  GI : + abdominal pain,No vomiting, diarrhea   : no dysuria,hematuria        PHYSICAL EXAM:  General :NAD  Constitutional:  well-groomed, well-developed  Respiratory: CTAB/L  Cardiovascular: S1 and S2, RRR, no M/G/R  Gastrointestinal: BS+, soft, s/p exlap well healed, 3 abdominal drains removed, RUQ replaced   Extremities: No peripheral edema  Vascular: 2+ peripheral pulses  Skin: No rashes      LABS:                        9.0    17.48 )-----------( 643      ( 04 Jun 2019 05:45 )             27.9     06-04    142  |  115<H>  |  19  ----------------------------<  82  3.7   |  17<L>  |  4.16<H>    Ca    8.0<L>      04 Jun 2019 05:45            MICROBIOLOGY:  RECENT CULTURES:  05-28 .Blood XXXX XXXX   No growth at 5 days.          RADIOLOGY & ADDITIONAL STUDIES:  ISAIAS : negative for IE

## 2019-06-04 NOTE — PROGRESS NOTE ADULT - SUBJECTIVE AND OBJECTIVE BOX
Patient is a 58y old  Male who presents with a chief complaint of Abdominal pain, vomiting (04 Jun 2019 11:08)      INTERVAL HPI/OVERNIGHT EVENTS: no events     MEDICATIONS  (STANDING):  chlorhexidine 4% Liquid 1 Application(s) Topical <User Schedule>  heparin  Injectable 5000 Unit(s) SubCutaneous every 12 hours  nafcillin  IVPB 2 Gram(s) IV Intermittent every 4 hours  nafcillin  IVPB      pantoprazole  Injectable 40 milliGRAM(s) IV Push every 12 hours  sucralfate 1 Gram(s) Oral every 6 hours    MEDICATIONS  (PRN):  morphine  - Injectable 2 milliGRAM(s) IV Push every 4 hours PRN Severe Pain (7 - 10)  ondansetron Injectable 4 milliGRAM(s) IV Push every 6 hours PRN Nausea and/or Vomiting  oxyCODONE    5 mG/acetaminophen 325 mG 2 Tablet(s) Oral every 4 hours PRN Moderate Pain (4 - 6)      Allergies    No Known Allergies    Intolerances       Vital Signs Last 24 Hrs  T(C): 36.6 (04 Jun 2019 05:20), Max: 36.6 (04 Jun 2019 05:20)  T(F): 97.9 (04 Jun 2019 05:20), Max: 97.9 (04 Jun 2019 05:20)  HR: 92 (04 Jun 2019 05:20) (81 - 92)  BP: 152/90 (04 Jun 2019 05:20) (143/87 - 159/90)  BP(mean): --  RR: 18 (04 Jun 2019 05:20) (18 - 18)  SpO2: 96% (04 Jun 2019 05:20) (96% - 100%)    PHYSICAL EXAM:  GENERAL: NAD, well-groomed, well-developed  HEAD:  Atraumatic, Normocephalic  EYES: EOMI, PERRLA, conjunctiva and sclera clear  ENMT: No tonsillar erythema, exudates, or enlargement; Moist mucous membranes, Good dentition, No lesions  NECK: Supple, No JVD, Normal thyroid  NERVOUS SYSTEM:  Alert & Oriented X3, Good concentration; Motor Strength 5/5 B/L upper and lower extremities; DTRs 2+ intact and symmetric  CHEST/LUNG: Clear to percussion bilaterally; No rales, rhonchi, wheezing, or rubs  HEART: Regular rate and rhythm; No murmurs, rubs, or gallops  ABDOMEN: Soft, Nontender, Nondistended; Bowel sounds present, CINDY Drain, serosang   EXTREMITIES:  2+ Peripheral Pulses, No clubbing, cyanosis, or edema  LYMPH: No lymphadenopathy noted  SKIN: No rashes or lesions    LABS:                        9.0    17.48 )-----------( 643      ( 04 Jun 2019 05:45 )             27.9     06-04    142  |  115<H>  |  19  ----------------------------<  82  3.7   |  17<L>  |  4.16<H>    Ca    8.0<L>      04 Jun 2019 05:45          CAPILLARY BLOOD GLUCOSE      POCT Blood Glucose.: 100 mg/dL (04 Jun 2019 11:01)  POCT Blood Glucose.: 68 mg/dL (04 Jun 2019 07:43)  POCT Blood Glucose.: 68 mg/dL (04 Jun 2019 07:41)  POCT Blood Glucose.: 81 mg/dL (03 Jun 2019 20:59)  POCT Blood Glucose.: 84 mg/dL (03 Jun 2019 17:22)  POCT Blood Glucose.: 94 mg/dL (03 Jun 2019 12:01)      RADIOLOGY & ADDITIONAL TESTS:    Imaging Personally Reviewed:  [ X] YES  [ ] NO    Consultant(s) Notes Reviewed:  [ X] YES  [ ] NO    Care Discussed with Consultants/Other Providers [X ] YES  [ ] NO

## 2019-06-04 NOTE — PROGRESS NOTE ADULT - ASSESSMENT
59 yo M w/ history of gout, ETOH abuse, chronic pancreatitis, hep C p/w YUNIEL & sepsis POA from gastric perforation and purulent peritonitis & had ex-lap, yusef patch repair, peritoneal lavage on 5/16.     Purulent peritonitis & gastric perforation w/ sepsis POA and persistent MSSA bacteremia  - persistent leukocytosis    - retrogastric collection and/or lateral perihepatic/subcapsular collection drained by IR on 5/22 (250cc drained)  - OR & blood cultures grew MSSA, NGTD on repeat blood cx from 5/28  - on nafcillin   per ID   - TTE on 5/21 showed EF 60-65% w/ no evidence of valvular vegetation   - ISAIAS To be done today   - MRI of thoracolumbar spine was unremarkable & did not show diskitis /OM, Cervical spine MRI only showed chronic degenerative changes of C3-C6, with multilevel sac effacement   - CT showed a focal thickening at the hepatic flexure,    - colonoscopy w no acute finding     Hypokalemia  - replaced    gastric perforation status post ex-lap, yusef patch repair, peritoneal lavage on 5/16  - status post extubation on 5/17  - UGIS showed no leak or extravasation on 5/23  - transferred out of ICU on 5/23       YUNIEL  - initially due to ATN, now renal suspects Gentamicin nephrotoxicity, AIN, septic ATN  - renal following, Cr Continues to trend up     Post operative anemia on chronic anemia  - received 3 units on 5/16  -  s/p 1 unit of blood 6/2    Prophylaxis:  DVT: heparin   GI: Carafate and Protonix

## 2019-06-04 NOTE — PROGRESS NOTE ADULT - SUBJECTIVE AND OBJECTIVE BOX
Patient feels well no complaints today.    MEDICATIONS  (STANDING):  chlorhexidine 4% Liquid 1 Application(s) Topical <User Schedule>  heparin  Injectable 5000 Unit(s) SubCutaneous every 12 hours  lactated ringers. 1000 milliLiter(s) (75 mL/Hr) IV Continuous <Continuous>  nafcillin  IVPB 2 Gram(s) IV Intermittent every 4 hours  pantoprazole  Injectable 40 milliGRAM(s) IV Push every 12 hours  sucralfate 1 Gram(s) Oral every 6 hours    MEDICATIONS  (PRN):  morphine  - Injectable 2 milliGRAM(s) IV Push every 4 hours PRN Severe Pain (7 - 10)  ondansetron Injectable 4 milliGRAM(s) IV Push every 6 hours PRN Nausea and/or Vomiting  oxyCODONE    5 mG/acetaminophen 325 mG 2 Tablet(s) Oral every 4 hours PRN Moderate Pain (4 - 6)      06-03-19 @ 07:01  -  06-04-19 @ 07:00  --------------------------------------------------------  IN: 375 mL / OUT: 25 mL / NET: 350 mL      PHYSICAL EXAM:      T(C): 35.9 (06-04-19 @ 14:44), Max: 36.6 (06-04-19 @ 05:20)  HR: 83 (06-04-19 @ 15:44) (81 - 98)  BP: 143/84 (06-04-19 @ 14:44) (123/74 - 159/90)  RR: 18 (06-04-19 @ 15:44) (12 - 20)  SpO2: 95% (06-04-19 @ 15:44) (95% - 100%)  Wt(kg): --  Respiratory: clear anteriorly, decreased BS at bases  Cardiovascular: S1 S2  Gastrointestinal: soft NT ND +BS  Extremities: 1  edema                                    9.0    17.48 )-----------( 643      ( 04 Jun 2019 05:45 )             27.9     06-04    142  |  115<H>  |  19  ----------------------------<  82  3.7   |  17<L>  |  4.16<H>    Ca    8.0<L>      04 Jun 2019 05:45          Creatinine Trend: 4.16<--, 3.96<--, 3.16<--, 2.94<--, 2.45<--, 2.33<--  Assessment and Plan:    YUNIEL -- initially due to ATN. Now with a new episode of Cr rise. CT without hydro.  DDx: Gentamicin nephrotoxicity ATN; nafcillin AIN.  Supportive care; if continues on worsening trend, consider Nafcillin alternative if available. Patient feels well no complaints today.    MEDICATIONS  (STANDING):  chlorhexidine 4% Liquid 1 Application(s) Topical <User Schedule>  heparin  Injectable 5000 Unit(s) SubCutaneous every 12 hours  lactated ringers. 1000 milliLiter(s) (75 mL/Hr) IV Continuous <Continuous>  nafcillin  IVPB 2 Gram(s) IV Intermittent every 4 hours  pantoprazole  Injectable 40 milliGRAM(s) IV Push every 12 hours  sucralfate 1 Gram(s) Oral every 6 hours    MEDICATIONS  (PRN):  morphine  - Injectable 2 milliGRAM(s) IV Push every 4 hours PRN Severe Pain (7 - 10)  ondansetron Injectable 4 milliGRAM(s) IV Push every 6 hours PRN Nausea and/or Vomiting  oxyCODONE    5 mG/acetaminophen 325 mG 2 Tablet(s) Oral every 4 hours PRN Moderate Pain (4 - 6)      06-03-19 @ 07:01  -  06-04-19 @ 07:00  --------------------------------------------------------  IN: 375 mL / OUT: 25 mL / NET: 350 mL      PHYSICAL EXAM:      T(C): 35.9 (06-04-19 @ 14:44), Max: 36.6 (06-04-19 @ 05:20)  HR: 83 (06-04-19 @ 15:44) (81 - 98)  BP: 143/84 (06-04-19 @ 14:44) (123/74 - 159/90)  RR: 18 (06-04-19 @ 15:44) (12 - 20)  SpO2: 95% (06-04-19 @ 15:44) (95% - 100%)  Wt(kg): --  Respiratory: clear anteriorly, decreased BS at bases  Cardiovascular: S1 S2  Gastrointestinal: soft NT ND +BS  Extremities: 1  edema                                    9.0    17.48 )-----------( 643      ( 04 Jun 2019 05:45 )             27.9     06-04    142  |  115<H>  |  19  ----------------------------<  82  3.7   |  17<L>  |  4.16<H>    Ca    8.0<L>      04 Jun 2019 05:45          Creatinine Trend: 4.16<--, 3.96<--, 3.16<--, 2.94<--, 2.45<--, 2.33<--  Assessment and Plan:    YUNIEL -- DDX Gentamicin nephrotoxicity ATN; nafcillin AIN, infectious GN; post renal azotemia;  Supportive care; if continues on worsening trend, consider Nafcillin alternative if available.  C3; ASo titers;   Bladder scan   Will follow.

## 2019-06-04 NOTE — CHART NOTE - NSCHARTNOTEFT_GEN_A_CORE
IR called to evaluate leaking bloody fluid from mercedes-hepatic pigtail catheter.  Catheter examined, it flushed and aspirated well without leakage.  The dressing was saturated with bloody fluid which was removed and a new DSD applied.    -Most likely due to upsize of tube intercostally caused bleeding from insertion site, not from a clogged or dysfunctional catheter  -please reconsult if still bleeding

## 2019-06-04 NOTE — PROGRESS NOTE ADULT - SUBJECTIVE AND OBJECTIVE BOX
59 y/o male PMHx ETOH abuse, chronic pancreatitis, treated hepatitis C, gout c/o severe lower abdominal pain x 3 days. Pain worsened today. Pain is associated with nausea and multiple episodes of clear vomiting. Patient took 2 aspirins which did not relieve the pain. Last BM was 3 days ago. Last flatus was today. Patient denies fever, chills, constipation, diarrhea, hematuria, melena, hematochezia, chest pain, shortness of breath, dizziness. Patient denies prior incident. (16 May 2019 02:18)      Chief Complaint:  Patient is a 58y old  Male who presents with a chief complaint of Abdominal pain, vomiting (28 May 2019 11:54)      Review of Systems:    General:  No wt loss, fevers, chills, night sweats  Eyes:  Good vision, no reported pain  ENT:  No sore throat, pain, runny nose, dysphagia  CV:  No pain, palpitations, hypo/hypertension  Resp:  No dyspnea, cough, tachypnea, wheezing           Social History/Family History  SOCHX:   tobacco,  -  alcohol    FMHX: FA/MO  - contributory       Discussed with:  PMD, Family    Physical Exam:    Vital Signs:  Vital Signs Last 24 Hrs  T(C): 37.4 (28 May 2019 11:34), Max: 37.4 (28 May 2019 11:34)  T(F): 99.3 (28 May 2019 11:34), Max: 99.3 (28 May 2019 11:34)  HR: 91 (28 May 2019 11:34) (78 - 98)  BP: 148/83 (28 May 2019 11:34) (132/73 - 171/70)  BP(mean): --  RR: 17 (28 May 2019 11:34) (16 - 17)  SpO2: 97% (28 May 2019 11:34) (95% - 97%)  Daily     Daily Weight in k.6 (28 May 2019 05:26)  I&O's Summary    27 May 2019 07:01  -  28 May 2019 07:00  --------------------------------------------------------  IN: 850 mL / OUT: 1112 mL / NET: -262 mL       conjunctivae clear, no thyromegaly, nodules, adenopathy, no JVD  Chest:  Full & symmetric excursion, no increased effort, breath sounds clear  Cardiovascular:  Regular rhythm, S1, S2, no murmur/rub/S3/S4, no carotid/femoral/abdominal bruit, radial/pedal pulses 2+,  edema  Abdomen:  Soft, non-tender, non-distended, normoactive bowel sounds, no HSM      Laboratory:                          7.3    22.75 )-----------( 645      ( 28 May 2019 06:57 )             22.3     05-28    150<H>  |  121<H>  |  18  ----------------------------<  83  3.5   |  20<L>  |  1.90<H>    Ca    7.6<L>      28 May 2019 06:57  Phos  3.7     -  Mg     1.5     -            CAPILLARY BLOOD GLUCOSE      POCT Blood Glucose.: 82 mg/dL (28 May 2019 10:41)  POCT Blood Glucose.: 91 mg/dL (28 May 2019 07:19)  POCT Blood Glucose.: 73 mg/dL (27 May 2019 22:11)  POCT Blood Glucose.: 77 mg/dL (27 May 2019 15:41)            Assessment:  Bacteremia  Long history and hospital course noted and reviewed  ID noted  TTE noted  ISAIAS completed, No vegetations seen

## 2019-06-04 NOTE — PROGRESS NOTE ADULT - PROBLEM SELECTOR PLAN 2
sec to above  blood c/s positive for MSSA from 5/24 and on admission   sec to above   ISAIAS neg for IE  MRI spine : neg for diskitis/OM   cont nafcillin as planned

## 2019-06-04 NOTE — PROGRESS NOTE ADULT - PROBLEM SELECTOR PLAN 1
sec to stomach perforation s/p yusef patch repair   initial OR c/s and blood c/+ MSSA  on nafcillin(day 16 of 28 ) for MSSA infection   genta discontinued ,received with level for 11 days   now YUNIEL worsening so will hold off ,all c/s stayed neg for Gram neg as such  cr worsening ,will hold off on any more genta  Mild leukocytosis noted today   follow wbc trend and temp curve  empiric coverage for gram neg and anaerobes has been discontinued

## 2019-06-04 NOTE — PROGRESS NOTE ADULT - SUBJECTIVE AND OBJECTIVE BOX
Gastroenterology  Patient seen and examined bedside resting comfortably.  No complaints offered.   No abdominal pain  Denies nausea and vomiting. Tolerating diet.  Normal flatus/BM.     T(F): 96.6 (06-04-19 @ 14:44), Max: 97.9 (06-04-19 @ 05:20)  HR: 83 (06-04-19 @ 15:44) (81 - 98)  BP: 143/84 (06-04-19 @ 14:44) (123/74 - 159/90)  RR: 18 (06-04-19 @ 15:44) (12 - 20)  SpO2: 95% (06-04-19 @ 15:44) (95% - 100%)  Wt(kg): --  CAPILLARY BLOOD GLUCOSE      POCT Blood Glucose.: 96 mg/dL (04 Jun 2019 15:41)  POCT Blood Glucose.: 100 mg/dL (04 Jun 2019 11:01)  POCT Blood Glucose.: 68 mg/dL (04 Jun 2019 07:43)  POCT Blood Glucose.: 68 mg/dL (04 Jun 2019 07:41)  POCT Blood Glucose.: 81 mg/dL (03 Jun 2019 20:59)  POCT Blood Glucose.: 84 mg/dL (03 Jun 2019 17:22)      PHYSICAL EXAM:  General: NAD, WDWN.   Neuro:  Alert & responsive  HEENT: NCAT, EOMI, conjunctiva clear  CV: +S1+S2 regular rate and rhythm  Lung: clear to ausculation bilaterally, respirations nonlabored, good inspiratory effort  Abdomen: soft, Non Tender, No distention Normal active BS  Extremities: no cyanosis, clubbing or edema    LABS:                        9.0    17.48 )-----------( 643      ( 04 Jun 2019 05:45 )             27.9     06-04    142  |  115<H>  |  19  ----------------------------<  82  3.7   |  17<L>  |  4.16<H>    Ca    8.0<L>      04 Jun 2019 05:45          I&O's Detail    03 Jun 2019 07:01  -  04 Jun 2019 07:00  --------------------------------------------------------  IN:    lactated ringers.: 75 mL    Solution: 300 mL  Total IN: 375 mL    OUT:    Bulb: 25 mL  Total OUT: 25 mL    Total NET: 350 mL        06-04 @ 05:45    142 | 115 | 19  /8.0 | -- | --  _______________________/  3.7 | 17 | 4.16                           \par

## 2019-06-04 NOTE — PROGRESS NOTE ADULT - ASSESSMENT
57 y/o male PMHx ETOH abuse, chronic pancreatitis, treated hepatitis C, gout, NOW POD#17 s/p ex-lap, yusef patch repair, post op course complicated by intraabdominal collection s/p IR perc drainage on 5/22, MSSA bacteremia Called to evaluate for focal thickening at hepatic flexure on imaging, Anemia  COLONOSCOPY- REDUNDANT COLO EXTRINSIC ADHESIONS, FAIR TO POOR PREP

## 2019-06-05 LAB
ANION GAP SERPL CALC-SCNC: 10 MMOL/L — SIGNIFICANT CHANGE UP (ref 5–17)
ASO AB SER QL: 57 IU/ML — SIGNIFICANT CHANGE UP (ref 0–199)
BUN SERPL-MCNC: 20 MG/DL — SIGNIFICANT CHANGE UP (ref 7–23)
C3 SERPL-MCNC: 50 MG/DL — LOW (ref 81–157)
CALCIUM SERPL-MCNC: 7.4 MG/DL — LOW (ref 8.5–10.1)
CHLORIDE SERPL-SCNC: 115 MMOL/L — HIGH (ref 96–108)
CO2 SERPL-SCNC: 16 MMOL/L — LOW (ref 22–31)
CREAT SERPL-MCNC: 4.83 MG/DL — HIGH (ref 0.5–1.3)
EOSINOPHIL NFR URNS MANUAL: NEGATIVE — SIGNIFICANT CHANGE UP
GLUCOSE SERPL-MCNC: 80 MG/DL — SIGNIFICANT CHANGE UP (ref 70–99)
HCT VFR BLD CALC: 24.7 % — LOW (ref 39–50)
HGB BLD-MCNC: 8.1 G/DL — LOW (ref 13–17)
MCHC RBC-ENTMCNC: 30.3 PG — SIGNIFICANT CHANGE UP (ref 27–34)
MCHC RBC-ENTMCNC: 32.8 GM/DL — SIGNIFICANT CHANGE UP (ref 32–36)
MCV RBC AUTO: 92.5 FL — SIGNIFICANT CHANGE UP (ref 80–100)
NRBC # BLD: 0 /100 WBCS — SIGNIFICANT CHANGE UP (ref 0–0)
PLATELET # BLD AUTO: 583 K/UL — HIGH (ref 150–400)
POTASSIUM SERPL-MCNC: 3.6 MMOL/L — SIGNIFICANT CHANGE UP (ref 3.5–5.3)
POTASSIUM SERPL-SCNC: 3.6 MMOL/L — SIGNIFICANT CHANGE UP (ref 3.5–5.3)
PROCALCITONIN SERPL-MCNC: 3.06 NG/ML — HIGH (ref 0.02–0.1)
RBC # BLD: 2.67 M/UL — LOW (ref 4.2–5.8)
RBC # FLD: 20.8 % — HIGH (ref 10.3–14.5)
SODIUM SERPL-SCNC: 141 MMOL/L — SIGNIFICANT CHANGE UP (ref 135–145)
WBC # BLD: 15.47 K/UL — HIGH (ref 3.8–10.5)
WBC # FLD AUTO: 15.47 K/UL — HIGH (ref 3.8–10.5)

## 2019-06-05 PROCEDURE — 99233 SBSQ HOSP IP/OBS HIGH 50: CPT

## 2019-06-05 RX ORDER — SODIUM CHLORIDE 9 MG/ML
1000 INJECTION, SOLUTION INTRAVENOUS
Refills: 0 | Status: COMPLETED | OUTPATIENT
Start: 2019-06-05 | End: 2019-06-06

## 2019-06-05 RX ORDER — CEFTRIAXONE 500 MG/1
2 INJECTION, POWDER, FOR SOLUTION INTRAMUSCULAR; INTRAVENOUS EVERY 24 HOURS
Refills: 0 | Status: DISCONTINUED | OUTPATIENT
Start: 2019-06-05 | End: 2019-06-17

## 2019-06-05 RX ADMIN — MORPHINE SULFATE 2 MILLIGRAM(S): 50 CAPSULE, EXTENDED RELEASE ORAL at 11:15

## 2019-06-05 RX ADMIN — CEFTRIAXONE 100 GRAM(S): 500 INJECTION, POWDER, FOR SOLUTION INTRAMUSCULAR; INTRAVENOUS at 17:21

## 2019-06-05 RX ADMIN — NAFCILLIN 200 GRAM(S): 10 INJECTION, POWDER, FOR SOLUTION INTRAVENOUS at 02:23

## 2019-06-05 RX ADMIN — SODIUM CHLORIDE 100 MILLILITER(S): 9 INJECTION, SOLUTION INTRAVENOUS at 15:17

## 2019-06-05 RX ADMIN — OXYCODONE AND ACETAMINOPHEN 2 TABLET(S): 5; 325 TABLET ORAL at 19:05

## 2019-06-05 RX ADMIN — NAFCILLIN 200 GRAM(S): 10 INJECTION, POWDER, FOR SOLUTION INTRAVENOUS at 06:15

## 2019-06-05 RX ADMIN — MORPHINE SULFATE 2 MILLIGRAM(S): 50 CAPSULE, EXTENDED RELEASE ORAL at 10:53

## 2019-06-05 RX ADMIN — MORPHINE SULFATE 2 MILLIGRAM(S): 50 CAPSULE, EXTENDED RELEASE ORAL at 15:32

## 2019-06-05 RX ADMIN — Medication 1 GRAM(S): at 06:14

## 2019-06-05 RX ADMIN — NAFCILLIN 200 GRAM(S): 10 INJECTION, POWDER, FOR SOLUTION INTRAVENOUS at 14:00

## 2019-06-05 RX ADMIN — HEPARIN SODIUM 5000 UNIT(S): 5000 INJECTION INTRAVENOUS; SUBCUTANEOUS at 06:14

## 2019-06-05 RX ADMIN — OXYCODONE AND ACETAMINOPHEN 2 TABLET(S): 5; 325 TABLET ORAL at 23:31

## 2019-06-05 RX ADMIN — MORPHINE SULFATE 2 MILLIGRAM(S): 50 CAPSULE, EXTENDED RELEASE ORAL at 15:17

## 2019-06-05 RX ADMIN — OXYCODONE AND ACETAMINOPHEN 2 TABLET(S): 5; 325 TABLET ORAL at 20:05

## 2019-06-05 RX ADMIN — MORPHINE SULFATE 2 MILLIGRAM(S): 50 CAPSULE, EXTENDED RELEASE ORAL at 06:32

## 2019-06-05 RX ADMIN — NAFCILLIN 200 GRAM(S): 10 INJECTION, POWDER, FOR SOLUTION INTRAVENOUS at 10:18

## 2019-06-05 RX ADMIN — PANTOPRAZOLE SODIUM 40 MILLIGRAM(S): 20 TABLET, DELAYED RELEASE ORAL at 06:16

## 2019-06-05 RX ADMIN — MORPHINE SULFATE 2 MILLIGRAM(S): 50 CAPSULE, EXTENDED RELEASE ORAL at 06:17

## 2019-06-05 NOTE — PROGRESS NOTE ADULT - PROBLEM SELECTOR PLAN 2
sec to above  blood c/s positive for MSSA from 5/24 and on admission   sec to above   ISAIAS neg for IE  MRI spine : neg for diskitis/OM   antibiotics changed as above

## 2019-06-05 NOTE — PROGRESS NOTE ADULT - ASSESSMENT
59 yo M w/ history of gout, ETOH abuse, chronic pancreatitis, hep C p/w YUNIEL & sepsis POA from gastric perforation and purulent peritonitis & had ex-lap, yusef patch repair, peritoneal lavage on 5/16.     Purulent peritonitis & gastric perforation w/ sepsis POA and persistent MSSA bacteremia  - persistent leukocytosis    - retrogastric collection and/or lateral perihepatic/subcapsular collection drained by IR on 5/22 (250cc drained)  - OR & blood cultures grew MSSA, NGTD on repeat blood cx from 5/28  - on nafcillin   per ID   - TTE on 5/21 showed EF 60-65% w/ no evidence of valvular vegetation   - ISAIAS negative   - MRI of thoracolumbar spine was unremarkable & did not show diskitis /OM, Cervical spine MRI only showed chronic degenerative changes of C3-C6, with multilevel sac effacement   - CT showed a focal thickening at the hepatic flexure,    - colonoscopy w no acute finding     Hypokalemia  - replaced    gastric perforation status post ex-lap, yusef patch repair, peritoneal lavage on 5/16  - status post extubation on 5/17  - UGIS showed no leak or extravasation on 5/23  - transferred out of ICU on 5/23       YUNIEL  - initially due to ATN, now renal suspects Gentamicin nephrotoxicity, AIN, septic ATN  - renal following, Cr Continues to trend up     Post operative anemia on chronic anemia  - received 3 units on 5/16  -  s/p 1 unit of blood 6/2    Prophylaxis:  DVT: heparin   GI: Carafate and Protonix

## 2019-06-05 NOTE — PROGRESS NOTE ADULT - ASSESSMENT
59 y/o male PMHx ETOH abuse, chronic pancreatitis, treated hepatitis C, gout, NOW POD#20 s/p ex-lap, yusef patch repair, post op course complicated by intraabdominal collection s/p IR perc drainage on 5/22, MSSA bacteremia Called to evaluate for focal thickening at hepatic flexure on imaging, Anemia  COLONOSCOPY- REDUNDANT COLO EXTRINSIC ADHESIONS, FAIR TO POOR PREP

## 2019-06-05 NOTE — PROGRESS NOTE ADULT - SUBJECTIVE AND OBJECTIVE BOX
Gastroenterology  Patient seen and examined bedside resting comfortably.  No complaints offered.   No abdominal pain  Denies nausea and vomiting. Tolerating diet.  Normal flatus/BM.     T(F): 97.7 (06-05-19 @ 12:49), Max: 99 (06-04-19 @ 18:52)  HR: 89 (06-05-19 @ 12:49) (83 - 98)  BP: 133/80 (06-05-19 @ 12:49) (117/70 - 149/87)  RR: 17 (06-05-19 @ 12:49) (12 - 20)  SpO2: 100% (06-05-19 @ 12:49) (95% - 100%)  Wt(kg): --  CAPILLARY BLOOD GLUCOSE      POCT Blood Glucose.: 152 mg/dL (05 Jun 2019 10:51)  POCT Blood Glucose.: 89 mg/dL (05 Jun 2019 07:40)  POCT Blood Glucose.: 61 mg/dL (04 Jun 2019 21:36)  POCT Blood Glucose.: 96 mg/dL (04 Jun 2019 15:41)      PHYSICAL EXAM:  General: NAD, WDWN.   Neuro:  Alert & responsive  HEENT: NCAT, EOMI, conjunctiva clear  CV: +S1+S2 regular rate and rhythm  Lung: clear to ausculation bilaterally, respirations nonlabored, good inspiratory effort  Abdomen: soft, Non tender, No Distension. Normal active BS  Extremities: no pedal edema or calf tenderness noted     LABS:                        8.1    15.47 )-----------( 583      ( 05 Jun 2019 08:10 )             24.7     06-05    141  |  115<H>  |  20  ----------------------------<  80  3.6   |  16<L>  |  4.83<H>    Ca    7.4<L>      05 Jun 2019 08:10          I&O's Detail    04 Jun 2019 07:01  -  05 Jun 2019 07:00  --------------------------------------------------------  IN:    lactated ringers.: 1400 mL    Oral Fluid: 240 mL    Solution: 300 mL  Total IN: 1940 mL    OUT:    Bulb: 15 mL  Total OUT: 15 mL    Total NET: 1925 mL

## 2019-06-05 NOTE — PROGRESS NOTE ADULT - SUBJECTIVE AND OBJECTIVE BOX
57 y/o male PMHx ETOH abuse, chronic pancreatitis, treated hepatitis C, gout c/o severe lower abdominal pain x 3 days. Pain worsened today. Pain is associated with nausea and multiple episodes of clear vomiting. Patient took 2 aspirins which did not relieve the pain. Last BM was 3 days ago. Last flatus was today. Patient denies fever, chills, constipation, diarrhea, hematuria, melena, hematochezia, chest pain, shortness of breath, dizziness. Patient denies prior incident. (16 May 2019 02:18)      Chief Complaint:  Patient is a 58y old  Male who presents with a chief complaint of Abdominal pain, vomiting (28 May 2019 11:54)      Review of Systems:    General:  No wt loss, fevers, chills, night sweats  Eyes:  Good vision, no reported pain  ENT:  No sore throat, pain, runny nose, dysphagia  CV:  No pain, palpitations, hypo/hypertension  Resp:  No dyspnea, cough, tachypnea, wheezing           Social History/Family History  SOCHX:   tobacco,  -  alcohol    FMHX: FA/MO  - contributory       Discussed with:  PMD, Family    Physical Exam:    Vital Signs:  Vital Signs Last 24 Hrs  T(C): 37.4 (28 May 2019 11:34), Max: 37.4 (28 May 2019 11:34)  T(F): 99.3 (28 May 2019 11:34), Max: 99.3 (28 May 2019 11:34)  HR: 91 (28 May 2019 11:34) (78 - 98)  BP: 148/83 (28 May 2019 11:34) (132/73 - 171/70)  BP(mean): --  RR: 17 (28 May 2019 11:34) (16 - 17)  SpO2: 97% (28 May 2019 11:34) (95% - 97%)  Daily     Daily Weight in k.6 (28 May 2019 05:26)  I&O's Summary    27 May 2019 07:01  -  28 May 2019 07:00  --------------------------------------------------------  IN: 850 mL / OUT: 1112 mL / NET: -262 mL       conjunctivae clear, no thyromegaly, nodules, adenopathy, no JVD  Chest:  Full & symmetric excursion, no increased effort, breath sounds clear  Cardiovascular:  Regular rhythm, S1, S2, no murmur/rub/S3/S4, no carotid/femoral/abdominal bruit, radial/pedal pulses 2+,  edema  Abdomen:  Soft, non-tender, non-distended, normoactive bowel sounds, no HSM      Laboratory:                          7.3    22.75 )-----------( 645      ( 28 May 2019 06:57 )             22.3     05-28    150<H>  |  121<H>  |  18  ----------------------------<  83  3.5   |  20<L>  |  1.90<H>    Ca    7.6<L>      28 May 2019 06:57  Phos  3.7     -  Mg     1.5     -            CAPILLARY BLOOD GLUCOSE      POCT Blood Glucose.: 82 mg/dL (28 May 2019 10:41)  POCT Blood Glucose.: 91 mg/dL (28 May 2019 07:19)  POCT Blood Glucose.: 73 mg/dL (27 May 2019 22:11)  POCT Blood Glucose.: 77 mg/dL (27 May 2019 15:41)            Assessment:  Bacteremia  Long history and hospital course noted and reviewed  ID noted  TTE noted  ISAIAS completed, No vegetations seen  ABX changed per ID

## 2019-06-05 NOTE — PROGRESS NOTE ADULT - SUBJECTIVE AND OBJECTIVE BOX
SURGERY PROGRESS HPI:  Pt seen and examined at bedside. Pain is well controlled on pain medication. Pt denies complaints. No acute events overnight. Pt tolerating diet. Pt denies nausea and vomiting.  +BM/flatus. Voiding well. Pt denies chest pain, SOB, dizziness, fever, chills. Reports that PT came to see him.      Vital Signs Last 24 Hrs  T(C): 36.4 (05 Jun 2019 00:25), Max: 37.2 (04 Jun 2019 18:52)  T(F): 97.5 (05 Jun 2019 00:25), Max: 99 (04 Jun 2019 18:52)  HR: 89 (05 Jun 2019 00:25) (83 - 98)  BP: 127/67 (05 Jun 2019 00:25) (117/70 - 144/80)  BP(mean): --  RR: 17 (05 Jun 2019 00:25) (12 - 20)  SpO2: 98% (05 Jun 2019 00:25) (95% - 99%)      PHYSICAL EXAM:    GENERAL: NAD  CHEST/LUNG: Clear to ausculation, bilaterally   HEART: RRR S1S2  ABDOMEN: Surgical wound healing well. IR drain in place with serous output. non distended, +BS, soft, non tender, no guarding  EXTREMITIES:  calf soft, non tender b/l    I&O's Detail    03 Jun 2019 07:01  -  04 Jun 2019 07:00  --------------------------------------------------------  IN:    lactated ringers.: 75 mL    Solution: 300 mL  Total IN: 375 mL    OUT:    Bulb: 25 mL  Total OUT: 25 mL    Total NET: 350 mL      04 Jun 2019 07:01  -  05 Jun 2019 06:07  --------------------------------------------------------  IN:    lactated ringers.: 500 mL    Oral Fluid: 240 mL  Total IN: 740 mL    OUT:    Bulb: 15 mL  Total OUT: 15 mL    Total NET: 725 mL      LABS:                        9.0    17.48 )-----------( 643      ( 04 Jun 2019 05:45 )             27.9     06-04    142  |  115<H>  |  19  ----------------------------<  82  3.7   |  17<L>  |  4.16<H>    Ca    8.0<L>      04 Jun 2019 05:45        Assessment: 59 y/o male PMHx ETOH abuse, chronic pancreatitis, treated hepatitis C, gout, NOW POD#17 s/p ex-lap, yusef patch repair, post op course complicated by intraabdominal collection s/p IR perc drainage on 5/22, MSSA bacteremia Called to evaluate for focal thickening at hepatic flexure on imaging, Anemia  COLONOSCOPY- REDUNDANT COLO EXTRINSIC ADHESIONS, FAIR TO POOR PREP    Plan:  -f/u ISAIAS results  -pain management prn  -continue DVT prophylaxis, OOB, Ambulating  -continue incentive spirometer  -continue local wound care, IR drain care  -continue diet  -antibiotics per ID  -f/u labs  -will discuss pt with surgical attending SURGERY PROGRESS HPI:  Pt seen and examined at bedside. Pain is well controlled on pain medication. Pt denies complaints. No acute events overnight. Pt tolerating diet. Pt denies nausea and vomiting.  +BM/flatus. Voiding well. Pt denies chest pain, SOB, dizziness, fever, chills. Reports that PT came to see him.      Vital Signs Last 24 Hrs  T(C): 36.4 (05 Jun 2019 00:25), Max: 37.2 (04 Jun 2019 18:52)  T(F): 97.5 (05 Jun 2019 00:25), Max: 99 (04 Jun 2019 18:52)  HR: 89 (05 Jun 2019 00:25) (83 - 98)  BP: 127/67 (05 Jun 2019 00:25) (117/70 - 144/80)  BP(mean): --  RR: 17 (05 Jun 2019 00:25) (12 - 20)  SpO2: 98% (05 Jun 2019 00:25) (95% - 99%)      PHYSICAL EXAM:    GENERAL: NAD  CHEST/LUNG: Clear to ausculation, bilaterally   HEART: RRR S1S2  ABDOMEN: Surgical wound healing well. IR drain in place with serous output. non distended, +BS, soft, non tender, no guarding  EXTREMITIES:  calf soft, non tender b/l    I&O's Detail    03 Jun 2019 07:01  -  04 Jun 2019 07:00  --------------------------------------------------------  IN:    lactated ringers.: 75 mL    Solution: 300 mL  Total IN: 375 mL    OUT:    Bulb: 25 mL  Total OUT: 25 mL    Total NET: 350 mL      04 Jun 2019 07:01  -  05 Jun 2019 06:07  --------------------------------------------------------  IN:    lactated ringers.: 500 mL    Oral Fluid: 240 mL  Total IN: 740 mL    OUT:    Bulb: 15 mL  Total OUT: 15 mL    Total NET: 725 mL      LABS:                        9.0    17.48 )-----------( 643      ( 04 Jun 2019 05:45 )             27.9     06-04    142  |  115<H>  |  19  ----------------------------<  82  3.7   |  17<L>  |  4.16<H>    Ca    8.0<L>      04 Jun 2019 05:45        Assessment: 59 y/o male PMHx ETOH abuse, chronic pancreatitis, treated hepatitis C, gout, NOW POD#17 s/p ex-lap, yusef patch repair, post op course complicated by intraabdominal collection s/p IR perc drainage on 5/22, MSSA bacteremia Called to evaluate for focal thickening at hepatic flexure on imaging, Anemia  COLONOSCOPY- REDUNDANT COLO EXTRINSIC ADHESIONS, FAIR TO POOR PREP    Plan:  -f/u ISAIAS results  -pain management prn  -continue DVT prophylaxis, OOB, Ambulating  -continue incentive spirometer  -continue local wound care, IR drain care  -continue diet  -antibiotics per ID  -f/u labs  -continue medical management, renal input  -will discuss pt with surgical attending

## 2019-06-05 NOTE — PROGRESS NOTE ADULT - SUBJECTIVE AND OBJECTIVE BOX
Patient no new complaints;   ISAIAS negative for vegetations;     MEDICATIONS  (STANDING):  chlorhexidine 4% Liquid 1 Application(s) Topical <User Schedule>  dextrose 5% 1000 milliLiter(s) (100 mL/Hr) IV Continuous <Continuous>  heparin  Injectable 5000 Unit(s) SubCutaneous every 12 hours  nafcillin  IVPB 2 Gram(s) IV Intermittent every 4 hours  pantoprazole  Injectable 40 milliGRAM(s) IV Push every 12 hours  sucralfate 1 Gram(s) Oral every 6 hours    MEDICATIONS  (PRN):  morphine  - Injectable 2 milliGRAM(s) IV Push every 4 hours PRN Severe Pain (7 - 10)  ondansetron Injectable 4 milliGRAM(s) IV Push every 6 hours PRN Nausea and/or Vomiting  oxyCODONE    5 mG/acetaminophen 325 mG 2 Tablet(s) Oral every 4 hours PRN Moderate Pain (4 - 6)      06-04-19 @ 07:01  -  06-05-19 @ 07:00  --------------------------------------------------------  IN: 1940 mL / OUT: 15 mL / NET: 1925 mL    06-05-19 @ 07:01  -  06-05-19 @ 14:33  --------------------------------------------------------  IN: 120 mL / OUT: 0 mL / NET: 120 mL      PHYSICAL EXAM:      T(C): 36.5 (06-05-19 @ 12:49), Max: 37.2 (06-04-19 @ 18:52)  HR: 89 (06-05-19 @ 12:49) (83 - 89)  BP: 133/80 (06-05-19 @ 12:49) (117/70 - 149/87)  RR: 17 (06-05-19 @ 12:49) (16 - 18)  SpO2: 100% (06-05-19 @ 12:49) (95% - 100%)  Wt(kg): --  Respiratory: clear anteriorly, decreased BS at bases  Cardiovascular: S1 S2  Gastrointestinal: soft NT ND +BS  Extremities:  1 edema                                    8.1    15.47 )-----------( 583      ( 05 Jun 2019 08:10 )             24.7     06-05    141  |  115<H>  |  20  ----------------------------<  80  3.6   |  16<L>  |  4.83<H>    Ca    7.4<L>      05 Jun 2019 08:10      Bladder can noted < 100 mln PVR    Creatinine Trend: 4.83<--, 4.16<--, 3.96<--, 3.16<--, 2.94<--, 2.45<--  Assessment and Plan:    YUNIEL -- DDX Gentamicin nephrotoxicity ATN; nafcillin AIN, infectious GN;   Supportive care; if continues on worsening trend, consider Nafcillin alternative if available.  C3; ASo titers; Urine eos  IVF with bicarbonate support;   Will follow course. Patient no new complaints;   ISAIAS negative for vegetations;     MEDICATIONS  (STANDING):  chlorhexidine 4% Liquid 1 Application(s) Topical <User Schedule>  dextrose 5% 1000 milliLiter(s) (100 mL/Hr) IV Continuous <Continuous>  heparin  Injectable 5000 Unit(s) SubCutaneous every 12 hours  nafcillin  IVPB 2 Gram(s) IV Intermittent every 4 hours  pantoprazole  Injectable 40 milliGRAM(s) IV Push every 12 hours  sucralfate 1 Gram(s) Oral every 6 hours    MEDICATIONS  (PRN):  morphine  - Injectable 2 milliGRAM(s) IV Push every 4 hours PRN Severe Pain (7 - 10)  ondansetron Injectable 4 milliGRAM(s) IV Push every 6 hours PRN Nausea and/or Vomiting  oxyCODONE    5 mG/acetaminophen 325 mG 2 Tablet(s) Oral every 4 hours PRN Moderate Pain (4 - 6)      06-04-19 @ 07:01  -  06-05-19 @ 07:00  --------------------------------------------------------  IN: 1940 mL / OUT: 15 mL / NET: 1925 mL    06-05-19 @ 07:01  -  06-05-19 @ 14:33  --------------------------------------------------------  IN: 120 mL / OUT: 0 mL / NET: 120 mL      PHYSICAL EXAM:      T(C): 36.5 (06-05-19 @ 12:49), Max: 37.2 (06-04-19 @ 18:52)  HR: 89 (06-05-19 @ 12:49) (83 - 89)  BP: 133/80 (06-05-19 @ 12:49) (117/70 - 149/87)  RR: 17 (06-05-19 @ 12:49) (16 - 18)  SpO2: 100% (06-05-19 @ 12:49) (95% - 100%)  Wt(kg): --  Respiratory: clear anteriorly, decreased BS at bases  Cardiovascular: S1 S2  Gastrointestinal: soft NT ND +BS  Extremities:  1 edema                                    8.1    15.47 )-----------( 583      ( 05 Jun 2019 08:10 )             24.7     06-05    141  |  115<H>  |  20  ----------------------------<  80  3.6   |  16<L>  |  4.83<H>    Ca    7.4<L>      05 Jun 2019 08:10      Bladder can noted 48 mln PVR    Creatinine Trend: 4.83<--, 4.16<--, 3.96<--, 3.16<--, 2.94<--, 2.45<--  Assessment and Plan:    YUNIEL -- DDX Gentamicin nephrotoxicity ATN; nafcillin AIN, infectious GN;   Supportive care; if continues on worsening trend, consider Nafcillin alternative if available.  C3; ASo titers; Urine eos  IVF with bicarbonate support;   Will follow course.

## 2019-06-05 NOTE — PROGRESS NOTE ADULT - SUBJECTIVE AND OBJECTIVE BOX
Patient is a 58y old  Male who presents with a chief complaint of Abdominal pain, vomiting (05 Jun 2019 06:06)      INTERVAL HPI/OVERNIGHT EVENTS: no events     MEDICATIONS  (STANDING):  chlorhexidine 4% Liquid 1 Application(s) Topical <User Schedule>  heparin  Injectable 5000 Unit(s) SubCutaneous every 12 hours  lactated ringers. 1000 milliLiter(s) (75 mL/Hr) IV Continuous <Continuous>  nafcillin  IVPB 2 Gram(s) IV Intermittent every 4 hours  pantoprazole  Injectable 40 milliGRAM(s) IV Push every 12 hours  sucralfate 1 Gram(s) Oral every 6 hours    MEDICATIONS  (PRN):  morphine  - Injectable 2 milliGRAM(s) IV Push every 4 hours PRN Severe Pain (7 - 10)  ondansetron Injectable 4 milliGRAM(s) IV Push every 6 hours PRN Nausea and/or Vomiting  oxyCODONE    5 mG/acetaminophen 325 mG 2 Tablet(s) Oral every 4 hours PRN Moderate Pain (4 - 6)      Allergies    No Known Allergies    Intolerances         Vital Signs Last 24 Hrs  T(C): 36.6 (05 Jun 2019 06:10), Max: 37.2 (04 Jun 2019 18:52)  T(F): 97.8 (05 Jun 2019 06:10), Max: 99 (04 Jun 2019 18:52)  HR: 89 (05 Jun 2019 06:10) (83 - 98)  BP: 149/87 (05 Jun 2019 06:10) (117/70 - 149/87)  BP(mean): --  RR: 16 (05 Jun 2019 06:10) (12 - 20)  SpO2: 98% (05 Jun 2019 06:10) (95% - 99%)    PHYSICAL EXAM:  GENERAL: NAD, well-groomed, well-developed  HEAD:  Atraumatic, Normocephalic  EYES: EOMI, PERRLA, conjunctiva and sclera clear  ENMT: No tonsillar erythema, exudates, or enlargement; Moist mucous membranes, Good dentition, No lesions  NECK: Supple, No JVD, Normal thyroid  NERVOUS SYSTEM:  Alert & Oriented X3, Good concentration; Motor Strength 5/5 B/L upper and lower extremities; DTRs 2+ intact and symmetric  CHEST/LUNG: Clear to percussion bilaterally; No rales, rhonchi, wheezing, or rubs  HEART: Regular rate and rhythm; No murmurs, rubs, or gallops  ABDOMEN: Soft, Nontender, Nondistended; Bowel sounds present  EXTREMITIES:  2+ Peripheral Pulses, No clubbing, cyanosis, or edema  LYMPH: No lymphadenopathy noted  SKIN: No rashes or lesions    LABS:                        8.1    15.47 )-----------( 583      ( 05 Jun 2019 08:10 )             24.7     06-05    141  |  115<H>  |  20  ----------------------------<  80  3.6   |  16<L>  |  4.83<H>    Ca    7.4<L>      05 Jun 2019 08:10          CAPILLARY BLOOD GLUCOSE      POCT Blood Glucose.: 152 mg/dL (05 Jun 2019 10:51)  POCT Blood Glucose.: 89 mg/dL (05 Jun 2019 07:40)  POCT Blood Glucose.: 61 mg/dL (04 Jun 2019 21:36)  POCT Blood Glucose.: 96 mg/dL (04 Jun 2019 15:41)      RADIOLOGY & ADDITIONAL TESTS:    Imaging Personally Reviewed:  [ X] YES  [ ] NO    Consultant(s) Notes Reviewed:  [ X] YES  [ ] NO    Care Discussed with Consultants/Other Providers [X ] YES  [ ] NO

## 2019-06-05 NOTE — PROGRESS NOTE ADULT - SUBJECTIVE AND OBJECTIVE BOX
Patient is a 58y old  Male who presents with a chief complaint of Abdominal pain, vomiting (05 Jun 2019 14:33)      INTERVAL HPI / OVERNIGHT EVENTS: doing ok     MEDICATIONS  (STANDING):  cefTRIAXone   IVPB 2 Gram(s) IV Intermittent every 24 hours  chlorhexidine 4% Liquid 1 Application(s) Topical <User Schedule>  dextrose 5% 1000 milliLiter(s) (100 mL/Hr) IV Continuous <Continuous>  heparin  Injectable 5000 Unit(s) SubCutaneous every 12 hours  pantoprazole  Injectable 40 milliGRAM(s) IV Push every 12 hours  sucralfate 1 Gram(s) Oral every 6 hours    MEDICATIONS  (PRN):  morphine  - Injectable 2 milliGRAM(s) IV Push every 4 hours PRN Severe Pain (7 - 10)  ondansetron Injectable 4 milliGRAM(s) IV Push every 6 hours PRN Nausea and/or Vomiting  oxyCODONE    5 mG/acetaminophen 325 mG 2 Tablet(s) Oral every 4 hours PRN Moderate Pain (4 - 6)      Vital Signs Last 24 Hrs  T(C): 36.5 (05 Jun 2019 12:49), Max: 37.2 (04 Jun 2019 18:52)  T(F): 97.7 (05 Jun 2019 12:49), Max: 99 (04 Jun 2019 18:52)  HR: 85 (05 Jun 2019 14:53) (85 - 89)  BP: 144/75 (05 Jun 2019 14:53) (117/70 - 149/87)  BP(mean): --  RR: 18 (05 Jun 2019 14:53) (16 - 18)  SpO2: 98% (05 Jun 2019 14:53) (96% - 100%)    Review of systems:  General : no fever /chills,fatigue  CVS : no chest pain, palpitations  Lungs : no shortness of breath, cough  GI : no abdominal pain,vomiting, diarrhea   : no dysuria,hematuria        PHYSICAL EXAM:  General :NAD  Constitutional:  well-groomed, well-developed  Respiratory: CTAB/L  Cardiovascular: S1 and S2, RRR, no M/G/R  Gastrointestinal: BS+, soft, NT/ND.RUQ drain +  Extremities: No peripheral edema  Vascular: 2+ peripheral pulses  Skin: No rashes      LABS:                        8.1    15.47 )-----------( 583      ( 05 Jun 2019 08:10 )             24.7     06-05    141  |  115<H>  |  20  ----------------------------<  80  3.6   |  16<L>  |  4.83<H>    Ca    7.4<L>      05 Jun 2019 08:10            MICROBIOLOGY:  RECENT CULTURES:        RADIOLOGY & ADDITIONAL STUDIES:

## 2019-06-06 LAB
ALBUMIN SERPL ELPH-MCNC: 0.6 G/DL — LOW (ref 3.3–5)
ALP SERPL-CCNC: 242 U/L — HIGH (ref 40–120)
ALT FLD-CCNC: 14 U/L — SIGNIFICANT CHANGE UP (ref 12–78)
ANION GAP SERPL CALC-SCNC: 12 MMOL/L — SIGNIFICANT CHANGE UP (ref 5–17)
AST SERPL-CCNC: 19 U/L — SIGNIFICANT CHANGE UP (ref 15–37)
BASOPHILS # BLD AUTO: 0.14 K/UL — SIGNIFICANT CHANGE UP (ref 0–0.2)
BASOPHILS NFR BLD AUTO: 1 % — SIGNIFICANT CHANGE UP (ref 0–2)
BILIRUB SERPL-MCNC: 0.8 MG/DL — SIGNIFICANT CHANGE UP (ref 0.2–1.2)
BUN SERPL-MCNC: 24 MG/DL — HIGH (ref 7–23)
CALCIUM SERPL-MCNC: 7.5 MG/DL — LOW (ref 8.5–10.1)
CHLORIDE SERPL-SCNC: 115 MMOL/L — HIGH (ref 96–108)
CO2 SERPL-SCNC: 17 MMOL/L — LOW (ref 22–31)
CREAT SERPL-MCNC: 5.52 MG/DL — HIGH (ref 0.5–1.3)
EOSINOPHIL # BLD AUTO: 0 K/UL — SIGNIFICANT CHANGE UP (ref 0–0.5)
EOSINOPHIL NFR BLD AUTO: 0 % — SIGNIFICANT CHANGE UP (ref 0–6)
GLUCOSE SERPL-MCNC: 89 MG/DL — SIGNIFICANT CHANGE UP (ref 70–99)
HCT VFR BLD CALC: 24.8 % — LOW (ref 39–50)
HGB BLD-MCNC: 8.2 G/DL — LOW (ref 13–17)
IMM GRANULOCYTES NFR BLD AUTO: 0.6 % — SIGNIFICANT CHANGE UP (ref 0–1.5)
LYMPHOCYTES # BLD AUTO: 1.43 K/UL — SIGNIFICANT CHANGE UP (ref 1–3.3)
LYMPHOCYTES # BLD AUTO: 10.3 % — LOW (ref 13–44)
MCHC RBC-ENTMCNC: 30.1 PG — SIGNIFICANT CHANGE UP (ref 27–34)
MCHC RBC-ENTMCNC: 33.1 GM/DL — SIGNIFICANT CHANGE UP (ref 32–36)
MCV RBC AUTO: 91.2 FL — SIGNIFICANT CHANGE UP (ref 80–100)
MONOCYTES # BLD AUTO: 0.81 K/UL — SIGNIFICANT CHANGE UP (ref 0–0.9)
MONOCYTES NFR BLD AUTO: 5.9 % — SIGNIFICANT CHANGE UP (ref 2–14)
NEUTROPHILS # BLD AUTO: 11.36 K/UL — HIGH (ref 1.8–7.4)
NEUTROPHILS NFR BLD AUTO: 82.2 % — HIGH (ref 43–77)
NRBC # BLD: 0 /100 WBCS — SIGNIFICANT CHANGE UP (ref 0–0)
PLATELET # BLD AUTO: 541 K/UL — HIGH (ref 150–400)
POTASSIUM SERPL-MCNC: 3.4 MMOL/L — LOW (ref 3.5–5.3)
POTASSIUM SERPL-SCNC: 3.4 MMOL/L — LOW (ref 3.5–5.3)
PROT SERPL-MCNC: 5.2 GM/DL — LOW (ref 6–8.3)
RBC # BLD: 2.72 M/UL — LOW (ref 4.2–5.8)
RBC # FLD: 20.5 % — HIGH (ref 10.3–14.5)
SODIUM SERPL-SCNC: 144 MMOL/L — SIGNIFICANT CHANGE UP (ref 135–145)
WBC # BLD: 13.82 K/UL — HIGH (ref 3.8–10.5)
WBC # FLD AUTO: 13.82 K/UL — HIGH (ref 3.8–10.5)

## 2019-06-06 PROCEDURE — 99233 SBSQ HOSP IP/OBS HIGH 50: CPT

## 2019-06-06 RX ORDER — POTASSIUM CHLORIDE 20 MEQ
40 PACKET (EA) ORAL ONCE
Refills: 0 | Status: COMPLETED | OUTPATIENT
Start: 2019-06-06 | End: 2019-06-06

## 2019-06-06 RX ADMIN — OXYCODONE AND ACETAMINOPHEN 2 TABLET(S): 5; 325 TABLET ORAL at 00:31

## 2019-06-06 RX ADMIN — SODIUM CHLORIDE 100 MILLILITER(S): 9 INJECTION, SOLUTION INTRAVENOUS at 05:59

## 2019-06-06 RX ADMIN — CEFTRIAXONE 100 GRAM(S): 500 INJECTION, POWDER, FOR SOLUTION INTRAMUSCULAR; INTRAVENOUS at 17:13

## 2019-06-06 RX ADMIN — OXYCODONE AND ACETAMINOPHEN 2 TABLET(S): 5; 325 TABLET ORAL at 04:35

## 2019-06-06 RX ADMIN — PANTOPRAZOLE SODIUM 40 MILLIGRAM(S): 20 TABLET, DELAYED RELEASE ORAL at 05:54

## 2019-06-06 RX ADMIN — OXYCODONE AND ACETAMINOPHEN 2 TABLET(S): 5; 325 TABLET ORAL at 03:35

## 2019-06-06 RX ADMIN — OXYCODONE AND ACETAMINOPHEN 2 TABLET(S): 5; 325 TABLET ORAL at 17:52

## 2019-06-06 RX ADMIN — PANTOPRAZOLE SODIUM 40 MILLIGRAM(S): 20 TABLET, DELAYED RELEASE ORAL at 17:12

## 2019-06-06 RX ADMIN — HEPARIN SODIUM 5000 UNIT(S): 5000 INJECTION INTRAVENOUS; SUBCUTANEOUS at 05:54

## 2019-06-06 RX ADMIN — OXYCODONE AND ACETAMINOPHEN 2 TABLET(S): 5; 325 TABLET ORAL at 17:18

## 2019-06-06 RX ADMIN — HEPARIN SODIUM 5000 UNIT(S): 5000 INJECTION INTRAVENOUS; SUBCUTANEOUS at 17:12

## 2019-06-06 NOTE — PROGRESS NOTE ADULT - SUBJECTIVE AND OBJECTIVE BOX
INTERVAL HPI/OVERNIGHT EVENTS:    Pt denies complaints. No acute events overnight. Pt tolerating diet. Pt denies nausea and vomiting.  +BM/flatus. Voiding       Vital Signs Last 24 Hrs  T(C): 35.6 (06 Jun 2019 12:00), Max: 36.7 (06 Jun 2019 06:46)  T(F): 96 (06 Jun 2019 12:00), Max: 98 (06 Jun 2019 06:46)  HR: 80 (06 Jun 2019 12:00) (80 - 85)  BP: 157/88 (06 Jun 2019 12:00) (144/75 - 161/92)  BP(mean): --  RR: 18 (06 Jun 2019 12:00) (16 - 18)  SpO2: 95% (06 Jun 2019 12:00) (95% - 100%)    MEDICATIONS  (STANDING):  cefTRIAXone   IVPB 2 Gram(s) IV Intermittent every 24 hours  chlorhexidine 4% Liquid 1 Application(s) Topical <User Schedule>  heparin  Injectable 5000 Unit(s) SubCutaneous every 12 hours  pantoprazole  Injectable 40 milliGRAM(s) IV Push every 12 hours  sucralfate 1 Gram(s) Oral every 6 hours    MEDICATIONS  (PRN):  morphine  - Injectable 2 milliGRAM(s) IV Push every 4 hours PRN Severe Pain (7 - 10)  ondansetron Injectable 4 milliGRAM(s) IV Push every 6 hours PRN Nausea and/or Vomiting  oxyCODONE    5 mG/acetaminophen 325 mG 2 Tablet(s) Oral every 4 hours PRN Moderate Pain (4 - 6)      PHYSICAL EXAM:    GENERAL: NAD  HEAD:  Atraumatic, Normocephalic  EYES: EOMI, PERRLA, conjunctiva and sclera clear  CHEST/LUNG: Clear to ausculation, bilaterally   HEART: S1S2  ABDOMEN:Surgical wound healing well. IR drain in place with serous output. non distended, +BS, soft, non tender, no guarding  EXTREMITIES:  calf soft, non tender     I&O's Detail    05 Jun 2019 07:01  -  06 Jun 2019 07:00  --------------------------------------------------------  IN:    dextrose 5%: 1200 mL    Oral Fluid: 120 mL    Solution: 200 mL  Total IN: 1520 mL    OUT:    Bulb: 5 mL  Total OUT: 5 mL    Total NET: 1515 mL          LABS:                        8.2    13.82 )-----------( 541      ( 06 Jun 2019 07:54 )             24.8     06-06    144  |  115<H>  |  24<H>  ----------------------------<  89  3.4<L>   |  17<L>  |  5.52<H>    Ca    7.5<L>      06 Jun 2019 07:54    TPro  5.2<L>  /  Alb  0.6<L>  /  TBili  0.8  /  DBili  x   /  AST  19  /  ALT  14  /  AlkPhos  242<H>  06-06        Impression:  59 y/o male PMHx ETOH abuse, chronic pancreatitis, treated hepatitis C, gout, NOW POD#17 s/p ex-lap, yusef patch repair, post op course complicated by intraabdominal collection s/p IR perc drainage on 5/22, MSSA bacteremia  Anemia      Plan:  diet as tolerated  -pain management prn  -continue DVT prophylaxis, OOB, Ambulating  -continue incentive spirometer  -continue local wound care, IR drain care  -antibiotics per ID  -f/u labs  -continue medical management, renal input  prophylactic measure/supportive care

## 2019-06-06 NOTE — PROGRESS NOTE ADULT - SUBJECTIVE AND OBJECTIVE BOX
Patient is a 58y old  Male who presents with a chief complaint of Abdominal pain, vomiting (05 Jun 2019 16:21)      INTERVAL HPI/OVERNIGHT EVENTS:no events     MEDICATIONS  (STANDING):  cefTRIAXone   IVPB 2 Gram(s) IV Intermittent every 24 hours  chlorhexidine 4% Liquid 1 Application(s) Topical <User Schedule>  heparin  Injectable 5000 Unit(s) SubCutaneous every 12 hours  pantoprazole  Injectable 40 milliGRAM(s) IV Push every 12 hours  potassium chloride    Tablet ER 40 milliEquivalent(s) Oral once  sucralfate 1 Gram(s) Oral every 6 hours    MEDICATIONS  (PRN):  morphine  - Injectable 2 milliGRAM(s) IV Push every 4 hours PRN Severe Pain (7 - 10)  ondansetron Injectable 4 milliGRAM(s) IV Push every 6 hours PRN Nausea and/or Vomiting  oxyCODONE    5 mG/acetaminophen 325 mG 2 Tablet(s) Oral every 4 hours PRN Moderate Pain (4 - 6)      Allergies    No Known Allergies    Intolerances           Vital Signs Last 24 Hrs  T(C): 35.6 (06 Jun 2019 12:00), Max: 36.7 (06 Jun 2019 06:46)  T(F): 96 (06 Jun 2019 12:00), Max: 98 (06 Jun 2019 06:46)  HR: 80 (06 Jun 2019 12:00) (80 - 89)  BP: 157/88 (06 Jun 2019 12:00) (133/80 - 161/92)  BP(mean): --  RR: 18 (06 Jun 2019 12:00) (16 - 18)  SpO2: 95% (06 Jun 2019 12:00) (95% - 100%)    PHYSICAL EXAM:  GENERAL: NAD, well-groomed, well-developed  HEAD:  Atraumatic, Normocephalic  EYES: EOMI, PERRLA, conjunctiva and sclera clear  ENMT: No tonsillar erythema, exudates, or enlargement; Moist mucous membranes, Good dentition, No lesions  NECK: Supple, No JVD, Normal thyroid  NERVOUS SYSTEM:  Alert & Oriented X3, Good concentration; Motor Strength 5/5 B/L upper and lower extremities; DTRs 2+ intact and symmetric  CHEST/LUNG: Clear to percussion bilaterally; No rales, rhonchi, wheezing, or rubs  HEART: Regular rate and rhythm; No murmurs, rubs, or gallops  ABDOMEN: Soft, Nontender, Nondistended; Bowel sounds present  EXTREMITIES:  2+ Peripheral Pulses, No clubbing, cyanosis, or edema  LYMPH: No lymphadenopathy noted  SKIN: No rashes or lesions    LABS:                        8.2    13.82 )-----------( 541      ( 06 Jun 2019 07:54 )             24.8     06-06    144  |  115<H>  |  24<H>  ----------------------------<  89  3.4<L>   |  17<L>  |  5.52<H>    Ca    7.5<L>      06 Jun 2019 07:54    TPro  5.2<L>  /  Alb  0.6<L>  /  TBili  0.8  /  DBili  x   /  AST  19  /  ALT  14  /  AlkPhos  242<H>  06-06        CAPILLARY BLOOD GLUCOSE      POCT Blood Glucose.: 88 mg/dL (06 Jun 2019 10:45)  POCT Blood Glucose.: 77 mg/dL (06 Jun 2019 07:19)  POCT Blood Glucose.: 149 mg/dL (05 Jun 2019 23:24)      RADIOLOGY & ADDITIONAL TESTS:    Imaging Personally Reviewed:  [ X] YES  [ ] NO    Consultant(s) Notes Reviewed:  [ X] YES  [ ] NO    Care Discussed with Consultants/Other Providers [X ] YES  [ ] NO

## 2019-06-06 NOTE — PROGRESS NOTE ADULT - SUBJECTIVE AND OBJECTIVE BOX
Subjective: no new complaints.       MEDICATIONS  (STANDING):  cefTRIAXone   IVPB 2 Gram(s) IV Intermittent every 24 hours  chlorhexidine 4% Liquid 1 Application(s) Topical <User Schedule>  heparin  Injectable 5000 Unit(s) SubCutaneous every 12 hours  pantoprazole  Injectable 40 milliGRAM(s) IV Push every 12 hours  potassium chloride    Tablet ER 40 milliEquivalent(s) Oral once  sucralfate 1 Gram(s) Oral every 6 hours    MEDICATIONS  (PRN):  morphine  - Injectable 2 milliGRAM(s) IV Push every 4 hours PRN Severe Pain (7 - 10)  ondansetron Injectable 4 milliGRAM(s) IV Push every 6 hours PRN Nausea and/or Vomiting  oxyCODONE    5 mG/acetaminophen 325 mG 2 Tablet(s) Oral every 4 hours PRN Moderate Pain (4 - 6)          T(C): 35.6 (06-06-19 @ 12:00), Max: 36.7 (06-06-19 @ 06:46)  HR: 80 (06-06-19 @ 12:00) (80 - 85)  BP: 157/88 (06-06-19 @ 12:00) (144/75 - 161/92)  RR: 18 (06-06-19 @ 12:00) (16 - 18)  SpO2: 95% (06-06-19 @ 12:00) (95% - 100%)  Wt(kg): --        I&O's Detail    05 Jun 2019 07:01  -  06 Jun 2019 07:00  --------------------------------------------------------  IN:    dextrose 5%: 1200 mL    Oral Fluid: 120 mL    Solution: 200 mL  Total IN: 1520 mL    OUT:    Bulb: 5 mL  Total OUT: 5 mL    Total NET: 1515 mL               PHYSICAL EXAM:      GENERAL: comfortable.   EYES: EOMI, PERRLA, conjunctiva and sclera clear  NECK: Supple, no inc in JVP  CHEST/LUNG: Clear  HEART: S1S2  ABDOMEN: Soft, midline scar post recent Sx. RUQ drain.   EXTREMITIES:  trace edema  NEURO: no asterixis        LABS:  CBC Full  -  ( 06 Jun 2019 07:54 )  WBC Count : 13.82 K/uL  RBC Count : 2.72 M/uL  Hemoglobin : 8.2 g/dL  Hematocrit : 24.8 %  Platelet Count - Automated : 541 K/uL  Mean Cell Volume : 91.2 fl  Mean Cell Hemoglobin : 30.1 pg  Mean Cell Hemoglobin Concentration : 33.1 gm/dL  Auto Neutrophil # : 11.36 K/uL  Auto Lymphocyte # : 1.43 K/uL  Auto Monocyte # : 0.81 K/uL  Auto Eosinophil # : 0.00 K/uL  Auto Basophil # : 0.14 K/uL  Auto Neutrophil % : 82.2 %  Auto Lymphocyte % : 10.3 %  Auto Monocyte % : 5.9 %  Auto Eosinophil % : 0.0 %  Auto Basophil % : 1.0 %    06-06    144  |  115<H>  |  24<H>  ----------------------------<  89  3.4<L>   |  17<L>  |  5.52<H>    Ca    7.5<L>      06 Jun 2019 07:54    TPro  5.2<L>  /  Alb  0.6<L>  /  TBili  0.8  /  DBili  x   /  AST  19  /  ALT  14  /  AlkPhos  242<H>  06-06            Impression:  * YUNIEL -- initially due to ATN. Now with a new episode of Cr rise. CT without hydro.  DDx: Gentamicin nephrotoxicity, septic ATN.   * Perforated viscus complicated by peritonitis, intra-abd collection  * S/p David patch repair  * MSSA bacteremia    Recommendations:   * Keep off Gent.  * No dialytic indications at this time.   * Follow renal indices on IV bicarb  * BMP in am

## 2019-06-06 NOTE — PROGRESS NOTE ADULT - SUBJECTIVE AND OBJECTIVE BOX
58y old  Male who presents with a chief complaint of Abdominal pain, vomiting (28 May 2019 11:54)      Review of Systems:    General:  No wt loss, fevers, chills, night sweats  Eyes:  Good vision, no reported pain  ENT:  No sore throat, pain, runny nose, dysphagia  CV:  No pain, palpitations, hypo/hypertension  Resp:  No dyspnea, cough, tachypnea, wheezing           Social History/Family History  SOCHX:   tobacco,  -  alcohol    FMHX: FA/MO  - contributory       Discussed with:  PMD, Family    Physical Exam:    Vital Signs:  Vital Signs Last 24 Hrs  T(C): 37.4 (28 May 2019 11:34), Max: 37.4 (28 May 2019 11:34)  T(F): 99.3 (28 May 2019 11:34), Max: 99.3 (28 May 2019 11:34)  HR: 91 (28 May 2019 11:34) (78 - 98)  BP: 148/83 (28 May 2019 11:34) (132/73 - 171/70)  BP(mean): --  RR: 17 (28 May 2019 11:34) (16 - 17)  SpO2: 97% (28 May 2019 11:34) (95% - 97%)  Daily     Daily Weight in k.6 (28 May 2019 05:26)  I&O's Summary    27 May 2019 07:01  -  28 May 2019 07:00  --------------------------------------------------------  IN: 850 mL / OUT: 1112 mL / NET: -262 mL       conjunctivae clear, no thyromegaly, nodules, adenopathy, no JVD  Chest:  Full & symmetric excursion, no increased effort, breath sounds clear  Cardiovascular:  Regular rhythm, S1, S2, no murmur/rub/S3/S4, no carotid/femoral/abdominal bruit, radial/pedal pulses 2+,  edema  Abdomen:  Soft, non-tender, non-distended, normoactive bowel sounds, no HSM      Laboratory:                          7.3    22.75 )-----------( 645      ( 28 May 2019 06:57 )             22.3         150<H>  |  121<H>  |  18  ----------------------------<  83  3.5   |  20<L>  |  1.90<H>    Ca    7.6<L>      28 May 2019 06:57  Phos  3.7       Mg     1.5                 CAPILLARY BLOOD GLUCOSE      POCT Blood Glucose.: 82 mg/dL (28 May 2019 10:41)  POCT Blood Glucose.: 91 mg/dL (28 May 2019 07:19)  POCT Blood Glucose.: 73 mg/dL (27 May 2019 22:11)  POCT Blood Glucose.: 77 mg/dL (27 May 2019 15:41)            Assessment:  Bacteremia  Long history and hospital course noted and reviewed  ID noted  TTE noted  ISAIAS completed, No vegetations seen  ABX changed per ID

## 2019-06-06 NOTE — PROGRESS NOTE ADULT - ASSESSMENT
59 yo M w/ history of gout, ETOH abuse, chronic pancreatitis, hep C p/w YUNIEL & sepsis POA from gastric perforation and purulent peritonitis & had ex-lap, yusef patch repair, peritoneal lavage on 5/16.     Purulent peritonitis & gastric perforation w/ sepsis POA and persistent MSSA bacteremia  - persistent leukocytosis    - retrogastric collection and/or lateral perihepatic/subcapsular collection drained by IR on 5/22 (250cc drained)  - OR & blood cultures grew MSSA, NGTD on repeat blood cx from 5/28  - Switched to Ceftriaxone, due to worsening renal functio   - TTE on 5/21 showed EF 60-65% w/ no evidence of valvular vegetation   - ISAIAS negative   - MRI of thoracolumbar spine was unremarkable & did not show diskitis /OM, Cervical spine MRI only showed chronic degenerative changes of C3-C6, with multilevel sac effacement   - CT showed a focal thickening at the hepatic flexure,    - colonoscopy w no acute finding     Hypokalemia  - replaced    gastric perforation status post ex-lap, yusef patch repair, peritoneal lavage on 5/16  - status post extubation on 5/17  - UGIS showed no leak or extravasation on 5/23          YUNIEL  - initially due to ATN, now renal suspects Gentamicin nephrotoxicity, AIN, septic ATN  - renal following, Cr Continues to trend up     Post operative anemia on chronic anemia  - received 3 units on 5/16  -  s/p 1 unit of blood 6/2    Prophylaxis:  DVT: heparin   GI: Carafate and Protonix

## 2019-06-06 NOTE — CHART NOTE - NSCHARTNOTEFT_GEN_A_CORE
IR will f/u for CT scan and tube check 6/17.  Pt can be discharged with catheter.  Catheter flushing/aspirating well.  Dressing changed.    -Monitor output  -Keep bulb compressed

## 2019-06-07 LAB
ANION GAP SERPL CALC-SCNC: 11 MMOL/L — SIGNIFICANT CHANGE UP (ref 5–17)
BUN SERPL-MCNC: 25 MG/DL — HIGH (ref 7–23)
CALCIUM SERPL-MCNC: 7.7 MG/DL — LOW (ref 8.5–10.1)
CHLORIDE SERPL-SCNC: 112 MMOL/L — HIGH (ref 96–108)
CO2 SERPL-SCNC: 19 MMOL/L — LOW (ref 22–31)
CREAT SERPL-MCNC: 6.03 MG/DL — HIGH (ref 0.5–1.3)
GLUCOSE SERPL-MCNC: 75 MG/DL — SIGNIFICANT CHANGE UP (ref 70–99)
HCT VFR BLD CALC: 24.4 % — LOW (ref 39–50)
HGB BLD-MCNC: 8.1 G/DL — LOW (ref 13–17)
MCHC RBC-ENTMCNC: 30.5 PG — SIGNIFICANT CHANGE UP (ref 27–34)
MCHC RBC-ENTMCNC: 33.2 GM/DL — SIGNIFICANT CHANGE UP (ref 32–36)
MCV RBC AUTO: 91.7 FL — SIGNIFICANT CHANGE UP (ref 80–100)
NRBC # BLD: 0 /100 WBCS — SIGNIFICANT CHANGE UP (ref 0–0)
PLATELET # BLD AUTO: 561 K/UL — HIGH (ref 150–400)
POTASSIUM SERPL-MCNC: 3.3 MMOL/L — LOW (ref 3.5–5.3)
POTASSIUM SERPL-SCNC: 3.3 MMOL/L — LOW (ref 3.5–5.3)
RBC # BLD: 2.66 M/UL — LOW (ref 4.2–5.8)
RBC # FLD: 20.5 % — HIGH (ref 10.3–14.5)
SODIUM SERPL-SCNC: 142 MMOL/L — SIGNIFICANT CHANGE UP (ref 135–145)
WBC # BLD: 14.01 K/UL — HIGH (ref 3.8–10.5)
WBC # FLD AUTO: 14.01 K/UL — HIGH (ref 3.8–10.5)

## 2019-06-07 PROCEDURE — 99233 SBSQ HOSP IP/OBS HIGH 50: CPT

## 2019-06-07 RX ORDER — POTASSIUM CHLORIDE 20 MEQ
40 PACKET (EA) ORAL ONCE
Refills: 0 | Status: COMPLETED | OUTPATIENT
Start: 2019-06-07 | End: 2019-06-07

## 2019-06-07 RX ORDER — SODIUM CHLORIDE 9 MG/ML
1000 INJECTION, SOLUTION INTRAVENOUS
Refills: 0 | Status: DISCONTINUED | OUTPATIENT
Start: 2019-06-07 | End: 2019-06-09

## 2019-06-07 RX ADMIN — Medication 1 GRAM(S): at 05:43

## 2019-06-07 RX ADMIN — Medication 40 MILLIEQUIVALENT(S): at 14:54

## 2019-06-07 RX ADMIN — PANTOPRAZOLE SODIUM 40 MILLIGRAM(S): 20 TABLET, DELAYED RELEASE ORAL at 05:43

## 2019-06-07 RX ADMIN — CEFTRIAXONE 100 GRAM(S): 500 INJECTION, POWDER, FOR SOLUTION INTRAMUSCULAR; INTRAVENOUS at 17:06

## 2019-06-07 RX ADMIN — OXYCODONE AND ACETAMINOPHEN 2 TABLET(S): 5; 325 TABLET ORAL at 11:25

## 2019-06-07 RX ADMIN — OXYCODONE AND ACETAMINOPHEN 2 TABLET(S): 5; 325 TABLET ORAL at 23:38

## 2019-06-07 RX ADMIN — HEPARIN SODIUM 5000 UNIT(S): 5000 INJECTION INTRAVENOUS; SUBCUTANEOUS at 17:06

## 2019-06-07 RX ADMIN — OXYCODONE AND ACETAMINOPHEN 2 TABLET(S): 5; 325 TABLET ORAL at 12:19

## 2019-06-07 RX ADMIN — SODIUM CHLORIDE 100 MILLILITER(S): 9 INJECTION, SOLUTION INTRAVENOUS at 12:30

## 2019-06-07 RX ADMIN — PANTOPRAZOLE SODIUM 40 MILLIGRAM(S): 20 TABLET, DELAYED RELEASE ORAL at 17:05

## 2019-06-07 RX ADMIN — HEPARIN SODIUM 5000 UNIT(S): 5000 INJECTION INTRAVENOUS; SUBCUTANEOUS at 05:43

## 2019-06-07 NOTE — PROGRESS NOTE ADULT - SUBJECTIVE AND OBJECTIVE BOX
Patient is a 58y old  Male who presents with a chief complaint of Abdominal pain, vomiting (05 Jun 2019 16:21)      INTERVAL HPI/OVERNIGHT EVENTS:  Patient seen and examined bedside.   No overnight events.   States poor appetite  +BM  Denies fever, chills, N/V, dizziness, HA, cough, CP, palpitations, SOB, abdominal pain, dysuria, diarrhea, constipation.        MEDICATIONS  (STANDING):  cefTRIAXone   IVPB 2 Gram(s) IV Intermittent every 24 hours  chlorhexidine 4% Liquid 1 Application(s) Topical <User Schedule>  heparin  Injectable 5000 Unit(s) SubCutaneous every 12 hours  pantoprazole  Injectable 40 milliGRAM(s) IV Push every 12 hours  potassium chloride    Tablet ER 40 milliEquivalent(s) Oral once  sucralfate 1 Gram(s) Oral every 6 hours    MEDICATIONS  (PRN):  morphine  - Injectable 2 milliGRAM(s) IV Push every 4 hours PRN Severe Pain (7 - 10)  ondansetron Injectable 4 milliGRAM(s) IV Push every 6 hours PRN Nausea and/or Vomiting  oxyCODONE    5 mG/acetaminophen 325 mG 2 Tablet(s) Oral every 4 hours PRN Moderate Pain (4 - 6)      Allergies    No Known Allergies    Intolerances    Vitals noted.     PHYSICAL EXAM:  GENERAL: NAD  HEAD:  Atraumatic, Normocephalic  EYES: EOMI, PERRLA, conjunctiva and sclera clear  ENMT: No tonsillar erythema, exudates, or enlargement; Moist mucous membranes,  NECK: Supple, No JVD  NERVOUS SYSTEM:  Alert & Oriented X3, no focal  neuro deficits   CHEST/LUNG: good b/l air entry; No rales, rhonchi, wheezing, or rubs  HEART: Regular rate and rhythm; No murmurs, rubs, or gallops  ABDOMEN: Soft, mildly, diffusely TTP, NO guarding or rigidity,  Nondistended; Bowel sounds present, +CINDY drain in RUQ   midline excision is well heeling    EXTREMITIES:  2+ Peripheral Pulses b/l, +anasarca       Labs and imaging reviewed.                                    8.1    14.01 )-----------( 561      ( 07 Jun 2019 07:51 )             24.4   06-07    142  |  112<H>  |  25<H>  ----------------------------<  75  3.3<L>   |  19<L>  |  6.03<H>    Ca    7.7<L>      07 Jun 2019 07:51    TPro  5.2<L>  /  Alb  0.6<L>  /  TBili  0.8  /  DBili  x   /  AST  19  /  ALT  14  /  AlkPhos  242<H>  06-06        RADIOLOGY & ADDITIONAL TESTS:    Imaging Personally Reviewed:  [ X] YES  [ ] NO    Consultant(s) Notes Reviewed:  [ X] YES  [ ] NO    Care Discussed with Consultants/Other Providers [X ] YES  [ ] NO

## 2019-06-07 NOTE — PROGRESS NOTE ADULT - PROBLEM SELECTOR PLAN 1
sec to stomach perforation s/p yusef patch repair   initial OR c/s and blood c/+ MSSA  on nafcillin(day 16 of 28 ) for MSSA infection   cont Rocephin(day 19of 28 )    ok for midline as no access  cr worsening ,will hold off on any more genta  noted to have draining from one of drain site   Mild leukocytosis noted today   follow wbc trend and temp curve sec to stomach perforation s/p yusef patch repair   initial OR c/s and blood c/+ MSSA  cont Rocephin(day 19 of 28 )    ok for midline as no access  cr worsening ,will hold off on any more genta  noted to have draining from one of drain site   Mild leukocytosis noted today   follow wbc trend and temp curve

## 2019-06-07 NOTE — PROGRESS NOTE ADULT - SUBJECTIVE AND OBJECTIVE BOX
SURGERY PROGRESS HPI:  Pt seen and examined at bedside. Pain is well controlled. Pt denies complaints. No acute events overnight. Pt tolerating diet. Pt denies nausea and vomiting.  +BM/flatus. Voiding well. Pt denies chest pain, SOB, dizziness, fever, chills.     Vital Signs Last 24 Hrs  T(C): 36.8 (06 Jun 2019 23:41), Max: 36.8 (06 Jun 2019 23:41)  T(F): 98.2 (06 Jun 2019 23:41), Max: 98.2 (06 Jun 2019 23:41)  HR: 98 (06 Jun 2019 23:41) (80 - 98)  BP: 161/93 (06 Jun 2019 23:41) (140/72 - 161/93)  BP(mean): --  RR: 18 (06 Jun 2019 23:41) (16 - 18)  SpO2: 96% (06 Jun 2019 23:41) (95% - 100%)      PHYSICAL EXAM:    GENERAL: NAD  CHEST/LUNG: Clear to ausculation, bilaterally   HEART: RRR S1S2  ABDOMEN: Midline surgical wound healing well. IR drain in place with serous output. non distended, +BS, soft, non tender, no guarding  EXTREMITIES:  calf soft, non tender b/l    I&O's Detail    05 Jun 2019 07:01  -  06 Jun 2019 07:00  --------------------------------------------------------  IN:    dextrose 5%: 1200 mL    Oral Fluid: 120 mL    Solution: 200 mL  Total IN: 1520 mL    OUT:    Bulb: 5 mL  Total OUT: 5 mL    Total NET: 1515 mL      06 Jun 2019 07:01  -  07 Jun 2019 05:19  --------------------------------------------------------  IN:    Oral Fluid: 50 mL  Total IN: 50 mL    OUT:    Drain: 10 mL  Total OUT: 10 mL    Total NET: 40 mL          LABS:                        8.2    13.82 )-----------( 541      ( 06 Jun 2019 07:54 )             24.8     06-06    144  |  115<H>  |  24<H>  ----------------------------<  89  3.4<L>   |  17<L>  |  5.52<H>    Ca    7.5<L>      06 Jun 2019 07:54    TPro  5.2<L>  /  Alb  0.6<L>  /  TBili  0.8  /  DBili  x   /  AST  19  /  ALT  14  /  AlkPhos  242<H>  06-06          Impression:  57 y/o male PMHx ETOH abuse, chronic pancreatitis, treated hepatitis C, gout, NOW POD#22 s/p ex-lap, yusef patch repair, post op course complicated by intraabdominal collection s/p IR perc drainage on 5/22, MSSA bacteremia. ISAIAS completed, No vegetations seen.    Plan:  -continue diet  -pain management prn  -continue DVT prophylaxis, OOB, Ambulating  -continue incentive spirometer  -continue local wound care, IR drain care  -antibiotics per ID  -f/u labs  -continue medical management, renal input  -prophylactic measure/supportive care  -Per IR: IR will f/u for CT scan and tube check 6/17.  Pt can be discharged with catheter  -will discuss pt with surgical attending

## 2019-06-07 NOTE — PROGRESS NOTE ADULT - ASSESSMENT
59 yo M w/ history of gout, ETOH abuse, chronic pancreatitis, hep C p/w YUNIEL & sepsis POA from gastric perforation and purulent peritonitis & had ex-lap, yusef patch repair, peritoneal lavage on 5/16. NOW POD#22 s/p ex-lap, yusef patch repair, post op course complicated by intraabdominal collection s/p IR perc drainage on 5/22, MSSA bacteremia. ISAIAS completed, No vegetations seen.    Purulent peritonitis & gastric perforation w/ sepsis POA and persistent MSSA bacteremia  - persistent leukocytosis    - retrogastric collection and/or lateral perihepatic/subcapsular collection drained by IR on 5/22 (250cc drained)  - OR & blood cultures grew MSSA, NGTD on repeat blood cx from 5/28  - Switched to Ceftriaxone, due to worsening renal functio   - TTE on 5/21 showed EF 60-65% w/ no evidence of valvular vegetation   - ISAIAS negative   - MRI of thoracolumbar spine was unremarkable & did not show diskitis /OM, Cervical spine MRI only showed chronic degenerative changes of C3-C6, with multilevel sac effacement   - CT showed a focal thickening at the hepatic flexure,    - colonoscopy w no acute finding     Hypokalemia  - replaced    gastric perforation status post ex-lap, yusef patch repair, peritoneal lavage on 5/16  - status post extubation on 5/17  - UGIS showed no leak or extravasation on 5/23        YUNIEL  - initially due to ATN, now renal suspects Gentamicin nephrotoxicity, AIN, septic ATN  - renal following, Cr Continues to trend up     Post operative anemia on chronic anemia  - received 3 units on 5/16  -  s/p 1 unit of blood 6/2    Severe protein calorie malnutrition   -nutrition recs noted     Prophylaxis:  DVT: heparin   GI: Carafate and Protonix

## 2019-06-07 NOTE — CHART NOTE - NSCHARTNOTEFT_GEN_A_CORE
POD # 22, s/p exp - lap, gram patch repair, post - op course complicated by intra abdominal collection s/p IR perc drainage on 5/22, MSSA Bacteremia ; YUNIEL (Renal following -> no dialytic indications @ this time)    Factors impacting intake: [ ] none [ ] nausea  [ ] vomiting [ ] diarrhea [ ] constipation  [ ]chewing problems [ ] swallowing issues  [x ] other: persistent lack of appetite    6/4 - Diet Prescription: Diet, Regular:   Supplement Feeding Modality:  Oral  Ensure Enlive Cans or Servings Per Day:  1       Frequency:  Two Times a day (06-04-19 @ 14:22)    Intake: RD observed 0% via breakfast meal rounds today. Tray & nutritional completely untouched.     Current Weight: 6/7 - 208.3 (94.5 kg) ; 6/3 - 164.4 (74.6 kg)  % Weight Change - 21 % / 19.9 kg gain x 4 days     Nutrition focused physical exam conducted ;   Subcutaneous fat loss: [mild ] Orbital fat pads region, [unable ]Buccal fat region, [edema ]Triceps region,  [unable ]Ribs region.  Muscle wasting: [mild ]Temples region, [ moderate]Clavicle region, [moderate ]Shoulder region, [unable ]Scapula region, [edema ]Interosseous region,  [unable ]thigh region, [edema ]Calf region    Physical Appearance - 3+ Generalized edema - (L & R) Arm, ankle, foot, scrotum    BM on 6/1      Pertinent Medications: MEDICATIONS  (STANDING):  cefTRIAXone   IVPB 2 Gram(s) IV Intermittent every 24 hours  chlorhexidine 4% Liquid 1 Application(s) Topical <User Schedule>  heparin  Injectable 5000 Unit(s) SubCutaneous every 12 hours  pantoprazole  Injectable 40 milliGRAM(s) IV Push every 12 hours  sucralfate 1 Gram(s) Oral every 6 hours    MEDICATIONS  (PRN):  morphine  - Injectable 2 milliGRAM(s) IV Push every 4 hours PRN Severe Pain (7 - 10)  ondansetron Injectable 4 milliGRAM(s) IV Push every 6 hours PRN Nausea and/or Vomiting  oxyCODONE    5 mG/acetaminophen 325 mG 2 Tablet(s) Oral every 4 hours PRN Moderate Pain (4 - 6)    Pertinent Labs: 06-07 Na142 mmol/L Glu 75 mg/dL K+ 3.3 mmol/L<L> Cr  6.03 mg/dL<H> BUN 25 mg/dL<H> 06-02 Phos 3.8 mg/dL 06-06 Alb 0.6 g/dL<L>     CAPILLARY BLOOD GLUCOSE      POCT Blood Glucose.: 71 mg/dL (07 Jun 2019 07:26)  POCT Blood Glucose.: 87 mg/dL (06 Jun 2019 20:43)  POCT Blood Glucose.: 100 mg/dL (06 Jun 2019 15:43)  POCT Blood Glucose.: 88 mg/dL (06 Jun 2019 10:45)    Skin: surgical incision    Estimated Needs:   x ] no change since previous assessment (5/17/2019  )  [ ] recalculated:     Previous Nutrition Diagnosis:   Malnutrition; severe malnutrition in context of acute illness     Related to: inadequate protein-energy intake in setting of gastric perforation, s/p exploratory laparotomy, yusef patch      As evidenced by: <50% nutrition needs x 12 days, 2+ & 4+ edema , now c overall intake <50% of meals x 14 days, c improvement today for lunch meal    Nutrition Diagnosis is [ x ] ongoing  [ ] resolved  [ ] improved  [ ] not applicable     Goal: Pt to consume >75% of protein-energy needs; maintain wt +/-2# (not met)       Nutrition Diagnosis is [x ] ongoing  [ ] resolved [ ] not applicable     New Nutrition Diagnosis: [x ] not applicable       Interventions:   Recommend  [x ] Change Diet To: Regular; Low sodium  [x ] Nutrition Supplement- ensure enlive 8 oz twice per day (700 elizabeth & 40 g pro)   [ ] Nutrition Support  [ ] Other:     Monitoring and Evaluation:   [x ] PO intake [ x ] Tolerance to diet prescription [ x ] weights [ x ] labs[ x ] follow up per protocol  [ ] other:

## 2019-06-07 NOTE — PROGRESS NOTE ADULT - SUBJECTIVE AND OBJECTIVE BOX
Patient is a 58y old  Male who presents with a chief complaint of Abdominal pain, vomiting (09 Jun 2019 17:36)      INTERVAL HPI / OVERNIGHT EVENTS: doing ok     MEDICATIONS  (STANDING):  cefTRIAXone   IVPB 2 Gram(s) IV Intermittent every 24 hours  chlorhexidine 4% Liquid 1 Application(s) Topical <User Schedule>  heparin  Injectable 5000 Unit(s) SubCutaneous every 12 hours  pantoprazole  Injectable 40 milliGRAM(s) IV Push every 12 hours  sodium bicarbonate 650 milliGRAM(s) Oral three times a day  sucralfate 1 Gram(s) Oral every 6 hours    MEDICATIONS  (PRN):  hydrALAZINE 25 milliGRAM(s) Oral three times a day PRN Systolic blood pressure > 140  morphine  - Injectable 2 milliGRAM(s) IV Push every 4 hours PRN Severe Pain (7 - 10)  ondansetron Injectable 4 milliGRAM(s) IV Push every 6 hours PRN Nausea and/or Vomiting  oxyCODONE    5 mG/acetaminophen 325 mG 2 Tablet(s) Oral every 4 hours PRN Moderate Pain (4 - 6)      Vital Signs Last 24 Hrs  Tmax:afebrile    Review of systems:  General : no fever /chills,fatigue  CVS : no chest pain, palpitations  Lungs : no shortness of breath, cough  GI : no abdominal pain,vomiting, diarrhea   : no dysuria,hematuria        PHYSICAL EXAM:  General :NAD  Constitutional:  well-groomed, well-developed  Respiratory: CTAB/L  Cardiovascular: S1 and S2, RRR, no M/G/R  Gastrointestinal: BS+, soft, NT/ND,RUQ drain+  Extremities: No peripheral edema  Vascular: 2+ peripheral pulses  Skin: leakage from right drain site      LABS:  wbc 14  cr 6          MICROBIOLOGY:  RECENT CULTURES:        RADIOLOGY & ADDITIONAL STUDIES:

## 2019-06-07 NOTE — PROGRESS NOTE ADULT - SUBJECTIVE AND OBJECTIVE BOX
Patient feels well no complaints today.  No distress;    MEDICATIONS  (STANDING):  cefTRIAXone   IVPB 2 Gram(s) IV Intermittent every 24 hours  chlorhexidine 4% Liquid 1 Application(s) Topical <User Schedule>  heparin  Injectable 5000 Unit(s) SubCutaneous every 12 hours  pantoprazole  Injectable 40 milliGRAM(s) IV Push every 12 hours  sodium chloride 0.45%. 1000 milliLiter(s) (100 mL/Hr) IV Continuous <Continuous>  sucralfate 1 Gram(s) Oral every 6 hours    MEDICATIONS  (PRN):  morphine  - Injectable 2 milliGRAM(s) IV Push every 4 hours PRN Severe Pain (7 - 10)  ondansetron Injectable 4 milliGRAM(s) IV Push every 6 hours PRN Nausea and/or Vomiting  oxyCODONE    5 mG/acetaminophen 325 mG 2 Tablet(s) Oral every 4 hours PRN Moderate Pain (4 - 6)      06-06-19 @ 07:01  -  06-07-19 @ 07:00  --------------------------------------------------------  IN: 50 mL / OUT: 40 mL / NET: 10 mL      PHYSICAL EXAM:      T(C): 36.6 (06-07-19 @ 17:00), Max: 36.8 (06-06-19 @ 23:41)  HR: 80 (06-07-19 @ 17:00) (80 - 101)  BP: 145/78 (06-07-19 @ 17:00) (145/78 - 178/95)  RR: 16 (06-07-19 @ 17:00) (16 - 18)  SpO2: 96% (06-07-19 @ 17:00) (96% - 99%)  Wt(kg): --  Respiratory: clear anteriorly, decreased BS at bases  Cardiovascular: S1 S2  Gastrointestinal: soft NT ND +BS  Extremities:   edema                                    8.1    14.01 )-----------( 561      ( 07 Jun 2019 07:51 )             24.4     06-07    142  |  112<H>  |  25<H>  ----------------------------<  75  3.3<L>   |  19<L>  |  6.03<H>    Ca    7.7<L>      07 Jun 2019 07:51    TPro  5.2<L>  /  Alb  0.6<L>  /  TBili  0.8  /  DBili  x   /  AST  19  /  ALT  14  /  AlkPhos  242<H>  06-06  C3 Complement, Serum: 50 mg/dL (06.05.19 @ 10:41)        LIVER FUNCTIONS - ( 06 Jun 2019 07:54 )  Alb: 0.6 g/dL / Pro: 5.2 gm/dL / ALK PHOS: 242 U/L / ALT: 14 U/L / AST: 19 U/L / GGT: x           Creatinine Trend: 6.03<--, 5.52<--, 4.83<--, 4.16<--, 3.96<--, 3.16<--  Assessment and Plan:    YUNIEL -- DDX Gentamicin nephrotoxicity ATN; nafcillin AIN, infectious GN;   Low C3 suggestive of infectious GN, IC process vs occult CTD;   Will tend with JAZMIN; DsDNA; Hep profile;   May require HD soon, discussed with patient in detail, in agreement.

## 2019-06-08 LAB
ALBUMIN SERPL ELPH-MCNC: 0.6 G/DL — LOW (ref 3.3–5)
ALP SERPL-CCNC: 296 U/L — HIGH (ref 40–120)
ALT FLD-CCNC: 8 U/L — LOW (ref 12–78)
ANION GAP SERPL CALC-SCNC: 13 MMOL/L — SIGNIFICANT CHANGE UP (ref 5–17)
AST SERPL-CCNC: 15 U/L — SIGNIFICANT CHANGE UP (ref 15–37)
BILIRUB SERPL-MCNC: 0.6 MG/DL — SIGNIFICANT CHANGE UP (ref 0.2–1.2)
BUN SERPL-MCNC: 27 MG/DL — HIGH (ref 7–23)
CALCIUM SERPL-MCNC: 7.4 MG/DL — LOW (ref 8.5–10.1)
CHLORIDE SERPL-SCNC: 115 MMOL/L — HIGH (ref 96–108)
CO2 SERPL-SCNC: 17 MMOL/L — LOW (ref 22–31)
CREAT SERPL-MCNC: 6.72 MG/DL — HIGH (ref 0.5–1.3)
GLUCOSE SERPL-MCNC: 95 MG/DL — SIGNIFICANT CHANGE UP (ref 70–99)
HCT VFR BLD CALC: 25.4 % — LOW (ref 39–50)
HGB BLD-MCNC: 8.4 G/DL — LOW (ref 13–17)
MCHC RBC-ENTMCNC: 30.8 PG — SIGNIFICANT CHANGE UP (ref 27–34)
MCHC RBC-ENTMCNC: 33.1 GM/DL — SIGNIFICANT CHANGE UP (ref 32–36)
MCV RBC AUTO: 93 FL — SIGNIFICANT CHANGE UP (ref 80–100)
NRBC # BLD: 0 /100 WBCS — SIGNIFICANT CHANGE UP (ref 0–0)
PLATELET # BLD AUTO: 555 K/UL — HIGH (ref 150–400)
POTASSIUM SERPL-MCNC: 3.8 MMOL/L — SIGNIFICANT CHANGE UP (ref 3.5–5.3)
POTASSIUM SERPL-SCNC: 3.8 MMOL/L — SIGNIFICANT CHANGE UP (ref 3.5–5.3)
PROT SERPL-MCNC: 5.3 GM/DL — LOW (ref 6–8.3)
RBC # BLD: 2.73 M/UL — LOW (ref 4.2–5.8)
RBC # FLD: 20.8 % — HIGH (ref 10.3–14.5)
RHEUMATOID FACT SERPL-ACNC: <10 IU/ML — SIGNIFICANT CHANGE UP (ref 0–13)
SODIUM SERPL-SCNC: 145 MMOL/L — SIGNIFICANT CHANGE UP (ref 135–145)
WBC # BLD: 13.62 K/UL — HIGH (ref 3.8–10.5)
WBC # FLD AUTO: 13.62 K/UL — HIGH (ref 3.8–10.5)

## 2019-06-08 PROCEDURE — 99233 SBSQ HOSP IP/OBS HIGH 50: CPT

## 2019-06-08 RX ADMIN — OXYCODONE AND ACETAMINOPHEN 2 TABLET(S): 5; 325 TABLET ORAL at 10:57

## 2019-06-08 RX ADMIN — CHLORHEXIDINE GLUCONATE 1 APPLICATION(S): 213 SOLUTION TOPICAL at 09:07

## 2019-06-08 RX ADMIN — HEPARIN SODIUM 5000 UNIT(S): 5000 INJECTION INTRAVENOUS; SUBCUTANEOUS at 05:28

## 2019-06-08 RX ADMIN — CEFTRIAXONE 100 GRAM(S): 500 INJECTION, POWDER, FOR SOLUTION INTRAMUSCULAR; INTRAVENOUS at 17:39

## 2019-06-08 RX ADMIN — PANTOPRAZOLE SODIUM 40 MILLIGRAM(S): 20 TABLET, DELAYED RELEASE ORAL at 05:28

## 2019-06-08 RX ADMIN — HEPARIN SODIUM 5000 UNIT(S): 5000 INJECTION INTRAVENOUS; SUBCUTANEOUS at 17:38

## 2019-06-08 RX ADMIN — OXYCODONE AND ACETAMINOPHEN 2 TABLET(S): 5; 325 TABLET ORAL at 11:30

## 2019-06-08 RX ADMIN — OXYCODONE AND ACETAMINOPHEN 2 TABLET(S): 5; 325 TABLET ORAL at 22:45

## 2019-06-08 RX ADMIN — OXYCODONE AND ACETAMINOPHEN 2 TABLET(S): 5; 325 TABLET ORAL at 21:54

## 2019-06-08 RX ADMIN — PANTOPRAZOLE SODIUM 40 MILLIGRAM(S): 20 TABLET, DELAYED RELEASE ORAL at 17:38

## 2019-06-08 RX ADMIN — OXYCODONE AND ACETAMINOPHEN 2 TABLET(S): 5; 325 TABLET ORAL at 00:05

## 2019-06-08 RX ADMIN — OXYCODONE AND ACETAMINOPHEN 2 TABLET(S): 5; 325 TABLET ORAL at 05:40

## 2019-06-08 NOTE — PROGRESS NOTE ADULT - SUBJECTIVE AND OBJECTIVE BOX
Patient is a 58y old  Male who presents with a chief complaint of Abdominal pain, vomiting (07 Jun 2019 18:55)      INTERVAL HPI/OVERNIGHT EVENTS: no events     MEDICATIONS  (STANDING):  cefTRIAXone   IVPB 2 Gram(s) IV Intermittent every 24 hours  chlorhexidine 4% Liquid 1 Application(s) Topical <User Schedule>  heparin  Injectable 5000 Unit(s) SubCutaneous every 12 hours  pantoprazole  Injectable 40 milliGRAM(s) IV Push every 12 hours  sodium chloride 0.45%. 1000 milliLiter(s) (100 mL/Hr) IV Continuous <Continuous>  sucralfate 1 Gram(s) Oral every 6 hours    MEDICATIONS  (PRN):  morphine  - Injectable 2 milliGRAM(s) IV Push every 4 hours PRN Severe Pain (7 - 10)  ondansetron Injectable 4 milliGRAM(s) IV Push every 6 hours PRN Nausea and/or Vomiting  oxyCODONE    5 mG/acetaminophen 325 mG 2 Tablet(s) Oral every 4 hours PRN Moderate Pain (4 - 6)      Allergies    No Known Allergies    Intolerances         Vital Signs Last 24 Hrs  T(C): 36.4 (08 Jun 2019 05:29), Max: 36.6 (07 Jun 2019 17:00)  T(F): 97.6 (08 Jun 2019 05:29), Max: 97.9 (07 Jun 2019 17:00)  HR: 88 (08 Jun 2019 05:29) (80 - 99)  BP: 155/88 (08 Jun 2019 05:29) (145/78 - 169/99)  BP(mean): --  RR: 18 (08 Jun 2019 05:29) (16 - 18)  SpO2: 98% (08 Jun 2019 05:29) (96% - 99%)    PHYSICAL EXAM:  HEAD:  Atraumatic, Normocephalic  EYES: EOMI, PERRLA, conjunctiva and sclera clear  ENMT: No tonsillar erythema, exudates, or enlargement; Moist mucous membranes,  NECK: Supple, No JVD  NERVOUS SYSTEM:  Alert & Oriented X3, no focal  neuro deficits   CHEST/LUNG: good b/l air entry; No rales, rhonchi, wheezing, or rubs  HEART: Regular rate and rhythm; No murmurs, rubs, or gallops  ABDOMEN: Soft, mildly, diffusely TTP, NO guarding or rigidity,  Nondistended; Bowel sounds present, +CINDY drain in RUQ   midline excision is well heeling    EXTREMITIES:  2+ Peripheral Pulses b/l, +anasarca     LABS:                        8.1    14.01 )-----------( 561      ( 07 Jun 2019 07:51 )             24.4     06-07    142  |  112<H>  |  25<H>  ----------------------------<  75  3.3<L>   |  19<L>  |  6.03<H>    Ca    7.7<L>      07 Jun 2019 07:51          CAPILLARY BLOOD GLUCOSE      POCT Blood Glucose.: 90 mg/dL (08 Jun 2019 08:02)  POCT Blood Glucose.: 74 mg/dL (07 Jun 2019 23:32)  POCT Blood Glucose.: 68 mg/dL (07 Jun 2019 20:57)  POCT Blood Glucose.: 69 mg/dL (07 Jun 2019 15:48)  POCT Blood Glucose.: 76 mg/dL (07 Jun 2019 10:52)      RADIOLOGY & ADDITIONAL TESTS:    Imaging Personally Reviewed:  [ X] YES  [ ] NO    Consultant(s) Notes Reviewed:  [ X] YES  [ ] NO    Care Discussed with Consultants/Other Providers [X ] YES  [ ] NO

## 2019-06-08 NOTE — PROGRESS NOTE ADULT - SUBJECTIVE AND OBJECTIVE BOX
NEPHROLOGY PROGRESS NOTE    CHIEF COMPLAINT:  YUNIEL/CKD    HPI:  Renal function worsening.  He states he is making "alot of urine"    ROS:  c/o lower abdominal pain    EXAM:  T(F): 97 (06-08-19 @ 11:00)  HR: 84 (06-08-19 @ 11:00)  BP: 149/90 (06-08-19 @ 11:00)  RR: 17 (06-08-19 @ 11:00)  SpO2: 98% (06-08-19 @ 11:00)    Conversant, in no apparent distress  Normal respiratory effort, lungs clear bilaterally  Heart RRR with no murmur, generalized edema         LABS                             8.4    13.62 )-----------( 555      ( 08 Jun 2019 10:53 )             25.4          06-08    145  |  115<H>  |  27<H>  ----------------------------<  95  3.8   |  17<L>  |  6.72<H>    Ca    7.4<L>      08 Jun 2019 10:53    TPro  5.3<L>  /  Alb  0.6<L>  /  TBili  0.6  /  DBili  x   /  AST  15  /  ALT  8<L>  /  AlkPhos  296<H>  06-08    YUNIEL -- DDX Gentamicin nephrotoxicity ATN; nafcillin AIN, infectious GN;   Low C3 suggestive of infectious GN, IC process vs occult CTD;   Will tend with JAZMIN; DsDNA; Hep profile;   May require HD early next week  Would stop IV fluid

## 2019-06-08 NOTE — PROGRESS NOTE ADULT - SUBJECTIVE AND OBJECTIVE BOX
Surgery NP note  Patient seen and examined bedside resting comfortably.  No complaints offered. Abdominal pain is well controlled.  Denies nausea and vomiting. Tolerating diet.  Normal flatus/BM.  Pt denies chest pain, SOB, dizziness, fever, chills.     T(F): 97 (06-08-19 @ 11:00), Max: 97.9 (06-07-19 @ 17:00)  HR: 84 (06-08-19 @ 11:00) (80 - 99)  BP: 149/90 (06-08-19 @ 11:00) (145/78 - 169/99)  RR: 17 (06-08-19 @ 11:00) (16 - 18)  SpO2: 98% (06-08-19 @ 11:00) (96% - 98%)  Wt(kg): --  CAPILLARY BLOOD GLUCOSE      POCT Blood Glucose.: 95 mg/dL (08 Jun 2019 11:30)  POCT Blood Glucose.: 90 mg/dL (08 Jun 2019 08:02)  POCT Blood Glucose.: 74 mg/dL (07 Jun 2019 23:32)  POCT Blood Glucose.: 68 mg/dL (07 Jun 2019 20:57)  POCT Blood Glucose.: 69 mg/dL (07 Jun 2019 15:48)      PHYSICAL EXAM:  General: NAD, WDWN.   Neuro:  Alert & responsive  HEENT: NCAT, EOMI, conjunctiva clear  CV: +S1+S2 regular rate and rhythm  Lung: clear to ausculation bilaterally, respirations nonlabored, good inspiratory effort  Abdomen: soft, Non tender, No Distension. Normal active BS. Midline surgical wound healing well. IR drain in place with serous output  Extremities: no pedal edema or calf tenderness noted     LABS:                        8.4    13.62 )-----------( 555      ( 08 Jun 2019 10:53 )             25.4     06-08    145  |  115<H>  |  27<H>  ----------------------------<  95  3.8   |  17<L>  |  6.72<H>    Ca    7.4<L>      08 Jun 2019 10:53    TPro  5.3<L>  /  Alb  0.6<L>  /  TBili  0.6  /  DBili  x   /  AST  15  /  ALT  8<L>  /  AlkPhos  296<H>  06-08    LIVER FUNCTIONS - ( 08 Jun 2019 10:53 )  Alb: 0.6 g/dL / Pro: 5.3 gm/dL / ALK PHOS: 296 U/L / ALT: 8 U/L / AST: 15 U/L / GGT: x             I&O's Detail    07 Jun 2019 07:01  -  08 Jun 2019 07:00  --------------------------------------------------------  IN:    Oral Fluid: 260 mL    sodium chloride 0.45%.: 1200 mL  Total IN: 1460 mL    OUT:    Bulb: 5 mL  Total OUT: 5 mL    Total NET: 1455 mL      08 Jun 2019 07:01  -  08 Jun 2019 14:55  --------------------------------------------------------  IN:    Oral Fluid: 200 mL  Total IN: 200 mL    OUT:  Total OUT: 0 mL    Total NET: 200 mL        Impression:  57 y/o male PMHx ETOH abuse, chronic pancreatitis, treated hepatitis C, gout, NOW POD#23 s/p ex-lap, yusef patch repair, post op course complicated by intraabdominal collection s/p IR perc drainage on 5/22, MSSA bacteremia. ISAIAS completed, No vegetations seen.    Plan:  -continue diet, pain management prn  -continue DVT prophylaxis, OOB, Ambulating, continue incentive spirometer  -continue local wound care, IR drain care  -antibiotics per ID  -f/u labs  -continue medical management, renal input  -Per IR: IR will f/u for CT scan and tube check 6/17.  Pt can be discharged with catheter  -Discussed with Dr Mcgee

## 2019-06-08 NOTE — PROGRESS NOTE ADULT - ASSESSMENT
57 yo M w/ history of gout, ETOH abuse, chronic pancreatitis, hep C p/w YUNIEL & sepsis POA from gastric perforation and purulent peritonitis & had ex-lap, yusef patch repair, peritoneal lavage on 5/16. NOW POD#22 s/p ex-lap, yusef patch repair, post op course complicated by intraabdominal collection s/p IR perc drainage on 5/22, MSSA bacteremia. ISAIAS completed, No vegetations seen.    Purulent peritonitis & gastric perforation w/ sepsis POA and persistent MSSA bacteremia  - persistent leukocytosis    - retrogastric collection and/or lateral perihepatic/subcapsular collection drained by IR on 5/22 (250cc drained)  - OR & blood cultures grew MSSA, NGTD on repeat blood cx from 5/28  - Switched to Ceftriaxone, due to worsening renal functio   - TTE on 5/21 showed EF 60-65% w/ no evidence of valvular vegetation   - ISAIAS negative   - MRI of thoracolumbar spine was unremarkable & did not show diskitis /OM, Cervical spine MRI only showed chronic degenerative changes of C3-C6, with multilevel sac effacement   - CT showed a focal thickening at the hepatic flexure,    - colonoscopy w no acute finding     Hypokalemia  - replaced    gastric perforation status post ex-lap, yusef patch repair, peritoneal lavage on 5/16  - status post extubation on 5/17  - UGIS showed no leak or extravasation on 5/23        YUNIEL  - initially due to ATN, now renal suspects Gentamicin nephrotoxicity, AIN, septic ATN  - renal following, Cr Continues to trend up   May need Dialysis     Post operative anemia on chronic anemia  - received 3 units on 5/16  -  s/p 1 unit of blood 6/2    Severe protein calorie malnutrition   -nutrition recs noted     Prophylaxis:  DVT: heparin   GI: Carafate and Protonix

## 2019-06-09 LAB
ANION GAP SERPL CALC-SCNC: 12 MMOL/L — SIGNIFICANT CHANGE UP (ref 5–17)
BUN SERPL-MCNC: 29 MG/DL — HIGH (ref 7–23)
CALCIUM SERPL-MCNC: 7.6 MG/DL — LOW (ref 8.5–10.1)
CHLORIDE SERPL-SCNC: 115 MMOL/L — HIGH (ref 96–108)
CO2 SERPL-SCNC: 15 MMOL/L — LOW (ref 22–31)
CREAT SERPL-MCNC: 7.1 MG/DL — HIGH (ref 0.5–1.3)
GLUCOSE SERPL-MCNC: 71 MG/DL — SIGNIFICANT CHANGE UP (ref 70–99)
HAV IGM SER-ACNC: SIGNIFICANT CHANGE UP
HBV CORE IGM SER-ACNC: SIGNIFICANT CHANGE UP
HBV SURFACE AG SER-ACNC: SIGNIFICANT CHANGE UP
HCT VFR BLD CALC: 24.7 % — LOW (ref 39–50)
HCV AB S/CO SERPL IA: 7.53 S/CO — HIGH (ref 0–0.99)
HCV AB SERPL-IMP: REACTIVE
HGB BLD-MCNC: 7.9 G/DL — LOW (ref 13–17)
MCHC RBC-ENTMCNC: 29.8 PG — SIGNIFICANT CHANGE UP (ref 27–34)
MCHC RBC-ENTMCNC: 32 GM/DL — SIGNIFICANT CHANGE UP (ref 32–36)
MCV RBC AUTO: 93.2 FL — SIGNIFICANT CHANGE UP (ref 80–100)
NRBC # BLD: 0 /100 WBCS — SIGNIFICANT CHANGE UP (ref 0–0)
PLATELET # BLD AUTO: 550 K/UL — HIGH (ref 150–400)
POTASSIUM SERPL-MCNC: 4.1 MMOL/L — SIGNIFICANT CHANGE UP (ref 3.5–5.3)
POTASSIUM SERPL-SCNC: 4.1 MMOL/L — SIGNIFICANT CHANGE UP (ref 3.5–5.3)
RBC # BLD: 2.65 M/UL — LOW (ref 4.2–5.8)
RBC # FLD: 20.7 % — HIGH (ref 10.3–14.5)
SODIUM SERPL-SCNC: 142 MMOL/L — SIGNIFICANT CHANGE UP (ref 135–145)
WBC # BLD: 13.64 K/UL — HIGH (ref 3.8–10.5)
WBC # FLD AUTO: 13.64 K/UL — HIGH (ref 3.8–10.5)

## 2019-06-09 PROCEDURE — 93971 EXTREMITY STUDY: CPT | Mod: 26,LT

## 2019-06-09 PROCEDURE — 99233 SBSQ HOSP IP/OBS HIGH 50: CPT

## 2019-06-09 RX ORDER — SODIUM BICARBONATE 1 MEQ/ML
650 SYRINGE (ML) INTRAVENOUS THREE TIMES A DAY
Refills: 0 | Status: DISCONTINUED | OUTPATIENT
Start: 2019-06-09 | End: 2019-06-13

## 2019-06-09 RX ORDER — HYDRALAZINE HCL 50 MG
25 TABLET ORAL THREE TIMES A DAY
Refills: 0 | Status: DISCONTINUED | OUTPATIENT
Start: 2019-06-09 | End: 2019-06-10

## 2019-06-09 RX ADMIN — Medication 650 MILLIGRAM(S): at 21:56

## 2019-06-09 RX ADMIN — OXYCODONE AND ACETAMINOPHEN 2 TABLET(S): 5; 325 TABLET ORAL at 05:27

## 2019-06-09 RX ADMIN — PANTOPRAZOLE SODIUM 40 MILLIGRAM(S): 20 TABLET, DELAYED RELEASE ORAL at 05:27

## 2019-06-09 RX ADMIN — OXYCODONE AND ACETAMINOPHEN 2 TABLET(S): 5; 325 TABLET ORAL at 06:15

## 2019-06-09 RX ADMIN — OXYCODONE AND ACETAMINOPHEN 2 TABLET(S): 5; 325 TABLET ORAL at 22:45

## 2019-06-09 RX ADMIN — HEPARIN SODIUM 5000 UNIT(S): 5000 INJECTION INTRAVENOUS; SUBCUTANEOUS at 05:27

## 2019-06-09 RX ADMIN — CEFTRIAXONE 100 GRAM(S): 500 INJECTION, POWDER, FOR SOLUTION INTRAMUSCULAR; INTRAVENOUS at 17:27

## 2019-06-09 RX ADMIN — OXYCODONE AND ACETAMINOPHEN 2 TABLET(S): 5; 325 TABLET ORAL at 11:58

## 2019-06-09 RX ADMIN — CHLORHEXIDINE GLUCONATE 1 APPLICATION(S): 213 SOLUTION TOPICAL at 08:11

## 2019-06-09 RX ADMIN — HEPARIN SODIUM 5000 UNIT(S): 5000 INJECTION INTRAVENOUS; SUBCUTANEOUS at 17:27

## 2019-06-09 RX ADMIN — PANTOPRAZOLE SODIUM 40 MILLIGRAM(S): 20 TABLET, DELAYED RELEASE ORAL at 17:27

## 2019-06-09 RX ADMIN — Medication 25 MILLIGRAM(S): at 13:15

## 2019-06-09 RX ADMIN — OXYCODONE AND ACETAMINOPHEN 2 TABLET(S): 5; 325 TABLET ORAL at 12:30

## 2019-06-09 RX ADMIN — Medication 650 MILLIGRAM(S): at 16:36

## 2019-06-09 RX ADMIN — OXYCODONE AND ACETAMINOPHEN 2 TABLET(S): 5; 325 TABLET ORAL at 22:01

## 2019-06-09 NOTE — CHART NOTE - NSCHARTNOTEFT_GEN_A_CORE
Medicine PA Note    Received sign-out to follow -up with patient's left arm swelling and upper arm doppler. Patient seen and examined at bedside. Patient denies any pain, decrease movement or numbness or tingling. Left upper arm circumference 32 cm, edema, no erythema,  no warmth , portable doppler noted brachial pulse +2  radial pulse + 2 sensory intact . From flex and extension.  Prior to examination, midlevel had patient elevated arm. Will continue to monitor the patient as well as follow up the upper arm doppler results.    [facility] Medicine PA Note    Received sign-out to follow -up with patient's left arm swelling and upper arm doppler. Patient seen and examined at bedside. Patient denies any pain, decrease movement or numbness or tingling. Left upper arm circumference 32 cm, edema, no erythema,  no warmth , portable doppler noted brachial pulse +2  radial pulse + 2 sensory intact . From flex and extension.  Prior to examination, midlevel had patient elevated arm. Will continue to monitor the patient as well as follow up the upper arm doppler results.    [facility]    Addendum    < from: US Duplex Venous Upper Ext Ltd, Left (06.09.19 @ 20:35) >      No deep vein thrombosis in the left upper extremity.    [facility]

## 2019-06-09 NOTE — PROGRESS NOTE ADULT - SUBJECTIVE AND OBJECTIVE BOX
NEPHROLOGY PROGRESS NOTE    CHIEF COMPLAINT:  YUNIEL/CKD    HPI:  Renal function worsening.  Still states urine output good.    ROS:  abdominal pain under controll, occ hiccups    EXAM:  T(F): 97 (06-09-19 @ 12:58)  HR: 87 (06-09-19 @ 12:58)  BP: 177/89 (06-09-19 @ 12:59)  RR: 17 (06-09-19 @ 12:58)  SpO2: 100% (06-09-19 @ 12:58)    Conversant, in no apparent distress  Normal respiratory effort, lungs clear bilaterally  Heart RRR with no murmur, no peripheral edema         LABS                             7.9    13.64 )-----------( 550      ( 09 Jun 2019 07:03 )             24.7          06-09    142  |  115<H>  |  29<H>  ----------------------------<  71  4.1   |  15<L>  |  7.10<H>    Ca    7.6<L>      09 Jun 2019 07:03    TPro  5.3<L>  /  Alb  0.6<L>  /  TBili  0.6  /  DBili  x   /  AST  15  /  ALT  8<L>  /  AlkPhos  296<H>  06-08    Hepatitis C reactive antibody    ASSESSMENT  1.  YUNIEL - suspect medication nephrotoxicity (gentamycin or nafcillin) or parainfectious GN  2.  Metabolic acidosis, NAG, worsening    PLAN  Start PO NaHCO3  He will need elective dialysis this week

## 2019-06-09 NOTE — PROGRESS NOTE ADULT - ASSESSMENT
57 yo M w/ history of gout, ETOH abuse, chronic pancreatitis, hep C p/w YUNIEL & sepsis POA from gastric perforation and purulent peritonitis & had ex-lap, yusef patch repair, peritoneal lavage on 5/16. NOW POD#22 s/p ex-lap, yusef patch repair, post op course complicated by intraabdominal collection s/p IR perc drainage on 5/22, MSSA bacteremia. ISAIAS completed, No vegetations seen.    Purulent peritonitis & gastric perforation w/ sepsis POA and persistent MSSA bacteremia  - persistent leukocytosis    - retrogastric collection and/or lateral perihepatic/subcapsular collection drained by IR on 5/22 (250cc drained)  - OR & blood cultures grew MSSA, NGTD on repeat blood cx from 5/28  - Switched to Ceftriaxone, due to worsening renal functio   - TTE on 5/21 showed EF 60-65% w/ no evidence of valvular vegetation   - ISAIAS negative   - MRI of thoracolumbar spine was unremarkable & did not show diskitis /OM, Cervical spine MRI only showed chronic degenerative changes of C3-C6, with multilevel sac effacement   - CT showed a focal thickening at the hepatic flexure,    - colonoscopy w no acute finding        YUNIEL  - initially due to ATN, now renal suspects Gentamicin nephrotoxicity, AIN, septic ATN  - renal following, Cr Continues to trend up   May need Dialysis       Hypokalemia  - replaced    gastric perforation status post ex-lap, yusef patch repair, peritoneal lavage on 5/16  - status post extubation on 5/17  - UGIS showed no leak or extravasation on 5/23       Post operative anemia on chronic anemia  - received 3 units on 5/16  -  s/p 1 unit of blood 6/2    Severe protein calorie malnutrition   -nutrition recs noted     Prophylaxis:  DVT: heparin   GI: Carafate and Protonix

## 2019-06-09 NOTE — PROGRESS NOTE ADULT - SUBJECTIVE AND OBJECTIVE BOX
Patient is a 58y old  Male who presents with a chief complaint of Abdominal pain, vomiting (09 Jun 2019 09:36)      INTERVAL HPI/OVERNIGHT EVENTS: no events     MEDICATIONS  (STANDING):  cefTRIAXone   IVPB 2 Gram(s) IV Intermittent every 24 hours  chlorhexidine 4% Liquid 1 Application(s) Topical <User Schedule>  heparin  Injectable 5000 Unit(s) SubCutaneous every 12 hours  pantoprazole  Injectable 40 milliGRAM(s) IV Push every 12 hours  sodium chloride 0.45%. 1000 milliLiter(s) (100 mL/Hr) IV Continuous <Continuous>  sucralfate 1 Gram(s) Oral every 6 hours    MEDICATIONS  (PRN):  morphine  - Injectable 2 milliGRAM(s) IV Push every 4 hours PRN Severe Pain (7 - 10)  ondansetron Injectable 4 milliGRAM(s) IV Push every 6 hours PRN Nausea and/or Vomiting  oxyCODONE    5 mG/acetaminophen 325 mG 2 Tablet(s) Oral every 4 hours PRN Moderate Pain (4 - 6)      Allergies    No Known Allergies    Intolerances           Vital Signs Last 24 Hrs  T(C): 36 (09 Jun 2019 05:28), Max: 36.1 (08 Jun 2019 11:00)  T(F): 96.8 (09 Jun 2019 05:28), Max: 97 (08 Jun 2019 11:00)  HR: 89 (09 Jun 2019 05:28) (82 - 89)  BP: 166/98 (09 Jun 2019 05:28) (128/70 - 166/98)  BP(mean): --  RR: 18 (09 Jun 2019 05:28) (16 - 18)  SpO2: 99% (09 Jun 2019 05:28) (96% - 99%)    PHYSICAL EXAM:  HEAD:  Atraumatic, Normocephalic  EYES: EOMI, PERRLA, conjunctiva and sclera clear  ENMT: No tonsillar erythema, exudates, or enlargement; Moist mucous membranes,  NECK: Supple, No JVD  NERVOUS SYSTEM:  Alert & Oriented X3, no focal  neuro deficits   CHEST/LUNG: good b/l air entry; No rales, rhonchi, wheezing, or rubs  HEART: Regular rate and rhythm; No murmurs, rubs, or gallops  ABDOMEN: Soft, mildly, diffusely TTP, NO guarding or rigidity,  Nondistended; Bowel sounds present, +CINDY drain in RUQ   midline excision is well heeling    EXTREMITIES:  2+ Peripheral Pulses b/l, +anasarca       LABS:                        7.9    13.64 )-----------( 550      ( 09 Jun 2019 07:03 )             24.7     06-09    142  |  115<H>  |  29<H>  ----------------------------<  71  4.1   |  15<L>  |  7.10<H>    Ca    7.6<L>      09 Jun 2019 07:03    TPro  5.3<L>  /  Alb  0.6<L>  /  TBili  0.6  /  DBili  x   /  AST  15  /  ALT  8<L>  /  AlkPhos  296<H>  06-08        CAPILLARY BLOOD GLUCOSE      POCT Blood Glucose.: 74 mg/dL (09 Jun 2019 07:36)  POCT Blood Glucose.: 85 mg/dL (08 Jun 2019 21:49)  POCT Blood Glucose.: 84 mg/dL (08 Jun 2019 16:49)  POCT Blood Glucose.: 95 mg/dL (08 Jun 2019 11:30)      RADIOLOGY & ADDITIONAL TESTS:    Imaging Personally Reviewed:  [ X] YES  [ ] NO    Consultant(s) Notes Reviewed:  [ X] YES  [ ] NO    Care Discussed with Consultants/Other Providers [X ] YES  [ ] NO

## 2019-06-09 NOTE — PROGRESS NOTE ADULT - SUBJECTIVE AND OBJECTIVE BOX
Surgery NP note  Patient seen and examined bedside resting comfortably.  No complaints offered. Abdominal pain is well controlled.  Denies nausea and vomiting. Tolerating diet.  Normal flatus/BM.  Pt denies chest pain, SOB, dizziness, fever, chills.     T(F): 96.8 (06-09-19 @ 05:28), Max: 97 (06-08-19 @ 11:00)  HR: 89 (06-09-19 @ 05:28) (82 - 89)  BP: 166/98 (06-09-19 @ 05:28) (128/70 - 166/98)  RR: 18 (06-09-19 @ 05:28) (16 - 18)  SpO2: 99% (06-09-19 @ 05:28) (96% - 99%)  Wt(kg): --  CAPILLARY BLOOD GLUCOSE      POCT Blood Glucose.: 74 mg/dL (09 Jun 2019 07:36)  POCT Blood Glucose.: 85 mg/dL (08 Jun 2019 21:49)  POCT Blood Glucose.: 84 mg/dL (08 Jun 2019 16:49)  POCT Blood Glucose.: 95 mg/dL (08 Jun 2019 11:30)    PHYSICAL EXAM:  General: NAD, WDWN.   Neuro:  Alert & responsive  HEENT: NCAT, EOMI, conjunctiva clear  CV: +S1+S2 regular rate and rhythm  Lung: clear to ausculation bilaterally, respirations nonlabored, good inspiratory effort  Abdomen: soft, Non tender, No Distension. Normal active BS. Midline surgical wound healing well. IR drain in place with serous output  Extremities: no pedal edema or calf tenderness noted     LABS:                        7.9    13.64 )-----------( 550      ( 09 Jun 2019 07:03 )             24.7     06-09    142  |  115<H>  |  29<H>  ----------------------------<  71  4.1   |  15<L>  |  7.10<H>    Ca    7.6<L>      09 Jun 2019 07:03    TPro  5.3<L>  /  Alb  0.6<L>  /  TBili  0.6  /  DBili  x   /  AST  15  /  ALT  8<L>  /  AlkPhos  296<H>  06-08    LIVER FUNCTIONS - ( 08 Jun 2019 10:53 )  Alb: 0.6 g/dL / Pro: 5.3 gm/dL / ALK PHOS: 296 U/L / ALT: 8 U/L / AST: 15 U/L / GGT: x             I&O's Detail    08 Jun 2019 07:01  -  09 Jun 2019 07:00  --------------------------------------------------------  IN:    Oral Fluid: 200 mL  Total IN: 200 mL    OUT:    Bulb: 5 mL  Total OUT: 5 mL    Total NET: 195 mL            Impression:  57 y/o male PMHx ETOH abuse, chronic pancreatitis, treated hepatitis C, gout, NOW POD#24 s/p ex-lap, yusef patch repair, post op course complicated by intraabdominal collection s/p IR perc drainage on 5/22, MSSA bacteremia. ISAIAS completed, No vegetations seen.    Plan:  -continue diet, pain management prn  -continue DVT prophylaxis, OOB, Ambulating, continue incentive spirometer  -continue local wound care, IR drain care  -antibiotics per ID  -f/u labs  -continue medical management, renal input  -Per IR: IR will f/u for CT scan and tube check 6/17.  Pt can be discharged with catheter  -Discussed with Dr Mcgee

## 2019-06-10 LAB
ANION GAP SERPL CALC-SCNC: 12 MMOL/L — SIGNIFICANT CHANGE UP (ref 5–17)
BUN SERPL-MCNC: 32 MG/DL — HIGH (ref 7–23)
C3 SERPL-MCNC: 66 MG/DL — LOW (ref 81–157)
C4 SERPL-MCNC: 27 MG/DL — SIGNIFICANT CHANGE UP (ref 13–39)
CALCIUM SERPL-MCNC: 7.8 MG/DL — LOW (ref 8.5–10.1)
CHLORIDE SERPL-SCNC: 113 MMOL/L — HIGH (ref 96–108)
CO2 SERPL-SCNC: 18 MMOL/L — LOW (ref 22–31)
CREAT SERPL-MCNC: 7.57 MG/DL — HIGH (ref 0.5–1.3)
GLUCOSE SERPL-MCNC: 98 MG/DL — SIGNIFICANT CHANGE UP (ref 70–99)
HCT VFR BLD CALC: 25.9 % — LOW (ref 39–50)
HGB BLD-MCNC: 8.3 G/DL — LOW (ref 13–17)
MCHC RBC-ENTMCNC: 29.6 PG — SIGNIFICANT CHANGE UP (ref 27–34)
MCHC RBC-ENTMCNC: 32 GM/DL — SIGNIFICANT CHANGE UP (ref 32–36)
MCV RBC AUTO: 92.5 FL — SIGNIFICANT CHANGE UP (ref 80–100)
NRBC # BLD: 0 /100 WBCS — SIGNIFICANT CHANGE UP (ref 0–0)
PLATELET # BLD AUTO: 556 K/UL — HIGH (ref 150–400)
POTASSIUM SERPL-MCNC: 3.6 MMOL/L — SIGNIFICANT CHANGE UP (ref 3.5–5.3)
POTASSIUM SERPL-SCNC: 3.6 MMOL/L — SIGNIFICANT CHANGE UP (ref 3.5–5.3)
RBC # BLD: 2.8 M/UL — LOW (ref 4.2–5.8)
RBC # FLD: 20.6 % — HIGH (ref 10.3–14.5)
SODIUM SERPL-SCNC: 143 MMOL/L — SIGNIFICANT CHANGE UP (ref 135–145)
WBC # BLD: 11.61 K/UL — HIGH (ref 3.8–10.5)
WBC # FLD AUTO: 11.61 K/UL — HIGH (ref 3.8–10.5)

## 2019-06-10 PROCEDURE — 76937 US GUIDE VASCULAR ACCESS: CPT | Mod: 26,AS,59

## 2019-06-10 PROCEDURE — 99233 SBSQ HOSP IP/OBS HIGH 50: CPT

## 2019-06-10 PROCEDURE — 36000 PLACE NEEDLE IN VEIN: CPT

## 2019-06-10 PROCEDURE — 36569 INSJ PICC 5 YR+ W/O IMAGING: CPT

## 2019-06-10 RX ORDER — PANTOPRAZOLE SODIUM 20 MG/1
40 TABLET, DELAYED RELEASE ORAL EVERY 12 HOURS
Refills: 0 | Status: DISCONTINUED | OUTPATIENT
Start: 2019-06-10 | End: 2019-06-21

## 2019-06-10 RX ADMIN — OXYCODONE AND ACETAMINOPHEN 2 TABLET(S): 5; 325 TABLET ORAL at 11:09

## 2019-06-10 RX ADMIN — Medication 650 MILLIGRAM(S): at 22:12

## 2019-06-10 RX ADMIN — Medication 25 MILLIGRAM(S): at 07:00

## 2019-06-10 RX ADMIN — Medication 650 MILLIGRAM(S): at 06:06

## 2019-06-10 RX ADMIN — OXYCODONE AND ACETAMINOPHEN 2 TABLET(S): 5; 325 TABLET ORAL at 06:04

## 2019-06-10 RX ADMIN — OXYCODONE AND ACETAMINOPHEN 2 TABLET(S): 5; 325 TABLET ORAL at 23:09

## 2019-06-10 RX ADMIN — PANTOPRAZOLE SODIUM 40 MILLIGRAM(S): 20 TABLET, DELAYED RELEASE ORAL at 17:23

## 2019-06-10 RX ADMIN — HEPARIN SODIUM 5000 UNIT(S): 5000 INJECTION INTRAVENOUS; SUBCUTANEOUS at 17:23

## 2019-06-10 RX ADMIN — Medication 1 GRAM(S): at 22:09

## 2019-06-10 RX ADMIN — OXYCODONE AND ACETAMINOPHEN 2 TABLET(S): 5; 325 TABLET ORAL at 06:50

## 2019-06-10 RX ADMIN — Medication 1 GRAM(S): at 17:23

## 2019-06-10 RX ADMIN — HEPARIN SODIUM 5000 UNIT(S): 5000 INJECTION INTRAVENOUS; SUBCUTANEOUS at 06:05

## 2019-06-10 RX ADMIN — CHLORHEXIDINE GLUCONATE 1 APPLICATION(S): 213 SOLUTION TOPICAL at 07:53

## 2019-06-10 RX ADMIN — OXYCODONE AND ACETAMINOPHEN 2 TABLET(S): 5; 325 TABLET ORAL at 12:21

## 2019-06-10 RX ADMIN — OXYCODONE AND ACETAMINOPHEN 2 TABLET(S): 5; 325 TABLET ORAL at 22:09

## 2019-06-10 RX ADMIN — CEFTRIAXONE 100 GRAM(S): 500 INJECTION, POWDER, FOR SOLUTION INTRAMUSCULAR; INTRAVENOUS at 17:25

## 2019-06-10 NOTE — PROGRESS NOTE ADULT - SUBJECTIVE AND OBJECTIVE BOX
57 yo M w/ history of gout, ETOH abuse, chronic pancreatitis, hep C p/w YUNIEL & sepsis POA from gastric perforation and purulent peritonitis & had ex-lap, yusef patch repair, peritoneal lavage on 5/16. His post op course was complicated by intraabdominal collection s/p IR perc drainage on 5/22 & MSSA bacteremia & YUNIEL. He is lying in bed in NAD.     MEDICATIONS  (STANDING):  cefTRIAXone   IVPB 2 Gram(s) IV Intermittent every 24 hours  chlorhexidine 4% Liquid 1 Application(s) Topical <User Schedule>  heparin  Injectable 5000 Unit(s) SubCutaneous every 12 hours  pantoprazole    Tablet 40 milliGRAM(s) Oral every 12 hours  sodium bicarbonate 650 milliGRAM(s) Oral three times a day  sucralfate 1 Gram(s) Oral every 6 hours    MEDICATIONS  (PRN):  hydrALAZINE 25 milliGRAM(s) Oral three times a day PRN Systolic blood pressure > 140  morphine  - Injectable 2 milliGRAM(s) IV Push every 4 hours PRN Severe Pain (7 - 10)  ondansetron Injectable 4 milliGRAM(s) IV Push every 6 hours PRN Nausea and/or Vomiting  oxyCODONE    5 mG/acetaminophen 325 mG 2 Tablet(s) Oral every 4 hours PRN Moderate Pain (4 - 6)      Allergies    No Known Allergies    Intolerances        Vital Signs Last 24 Hrs  T(C): 35.3 (10 Cesar 2019 12:00), Max: 36.1 (10 Cesar 2019 05:00)  T(F): 95.5 (10 Cesar 2019 12:00), Max: 96.9 (10 Cesar 2019 05:00)  HR: 81 (10 Cesar 2019 14:26) (80 - 85)  BP: 148/78 (10 Cesar 2019 14:26) (137/84 - 159/89)  BP(mean): --  RR: 19 (10 Cesar 2019 14:26) (16 - 19)  SpO2: 99% (10 Cesar 2019 14:26) (97% - 99%)    PHYSICAL EXAM:  GENERAL: NAD, well-groomed, well-developed  HEAD:  Atraumatic, Normocephalic  EYES: EOMI, PERRLA   NECK: Supple   NERVOUS SYSTEM: Alert    CHEST/LUNG: Clear to auscultation bilaterally; No rales, rhonchi, wheezing, or rubs  HEART: Regular rate and rhythm; No murmurs, rubs, or gallops  ABDOMEN: Soft, Nontender, Nondistended; Bowel sounds present, well healing surgical scar  EXTREMITIES: No clubbing, cyanosis, or edema    LABS:                        8.3    11.61 )-----------( 556      ( 10 Cesar 2019 06:13 )             25.9     06-10    143  |  113<H>  |  32<H>  ----------------------------<  98  3.6   |  18<L>  |  7.57<H>    Ca    7.8<L>      10 Cesar 2019 06:13          CAPILLARY BLOOD GLUCOSE      POCT Blood Glucose.: 88 mg/dL (10 Cesar 2019 15:37)  POCT Blood Glucose.: 84 mg/dL (10 Cesar 2019 10:55)  POCT Blood Glucose.: 84 mg/dL (10 Cesar 2019 07:21)  POCT Blood Glucose.: 92 mg/dL (09 Jun 2019 21:57)      RADIOLOGY & ADDITIONAL TESTS:    06-09-19 @ 07:01  -  06-10-19 @ 07:00  --------------------------------------------------------  IN:    Oral Fluid: 100 mL  Total IN: 100 mL    OUT:  Total OUT: 0 mL    Total NET: 100 mL      06-10-19 @ 07:01  -  06-10-19 @ 17:16  --------------------------------------------------------  IN:    Oral Fluid: 100 mL  Total IN: 100 mL    OUT:  Total OUT: 0 mL    Total NET: 100 mL

## 2019-06-10 NOTE — PROGRESS NOTE ADULT - SUBJECTIVE AND OBJECTIVE BOX
Postoperative Day # 25  s/p repair of perforated viscus    Patient seen and examined bedside resting comfortably.  No complaints offered.   Abdominal pain is well controlled.  Denies nausea and vomiting. Tolerating diet.  Flatus/BM. +  Denies chest pain, dyspnea, cough.    T(F): 96.9 (06-10-19 @ 05:00), Max: 97 (06-09-19 @ 12:58)  HR: 85 (06-10-19 @ 05:00) (80 - 92)  BP: 158/78 (06-10-19 @ 05:00) (137/84 - 177/89)  RR: 17 (06-10-19 @ 05:00) (16 - 17)  SpO2: 97% (06-10-19 @ 05:00) (97% - 100%)    CAPILLARY BLOOD GLUCOSE  POCT Blood Glucose.: 84 mg/dL (10 Cesar 2019 10:55)  POCT Blood Glucose.: 84 mg/dL (10 Cesar 2019 07:21)  POCT Blood Glucose.: 92 mg/dL (09 Jun 2019 21:57)  POCT Blood Glucose.: 74 mg/dL (09 Jun 2019 16:35)      PHYSICAL EXAM:  General: NAD  Neuro:  Alert & oriented x 3  Abdomen: soft, NTND. Normactive BS. IR drain on right side of the abdomen. Draining mainly saguinus fluid. has had bloody drainage around the tube.  LUE: still swollen. Duplex shows no DVT    LABS:                        8.3    11.61 )-----------( 556      ( 10 Cesar 2019 06:13 )             25.9     06-10    143  |  113<H>  |  32<H>  ----------------------------<  98  3.6   |  18<L>  |  7.57<H>    Ca    7.8<L>      10 Cesar 2019 06:13        I&O's Detail    09 Jun 2019 07:01  -  10 Cesar 2019 07:00  --------------------------------------------------------  IN:    Oral Fluid: 100 mL  Total IN: 100 mL    OUT:  Total OUT: 0 mL    Total NET: 100 mL      10 Cesar 2019 07:01  -  10 Cesar 2019 11:50  --------------------------------------------------------  IN:  Total IN: 0 mL    OUT:  Total OUT: 0 mL    Total NET: 0 mL          Impression: 58y Male Postoperative Day # 25  s/p repair of perforated viscus with peritonitis  swelling left UE  peritonitis      Plan:  - nursing team says no PIV can be obtained, despite many attempts - will need a midline to continue full  antibiotic treatment.  - will have IR reevaluate drain.  - monitor swelling of Left UE  - will discuss with attending.

## 2019-06-10 NOTE — CHART NOTE - NSCHARTNOTEFT_GEN_A_CORE
Hospitalist-NP Medicine    Requested by RN to place an IV heplock in patient. As per RN pt is a difficult stick. Placed IV heplock in right wrist. # 22  Fr. IV flushed and secured. Rn aware.

## 2019-06-10 NOTE — PROCEDURE NOTE - NSPROCDETAILS_GEN_ALL_CORE
blood seen on insertion/dressing applied/flushes easily/secured in place/ultrasound utilization
blood seen on insertion/flushes easily/dressing applied/ultrasound utilization/secured in place
location identified, draped/prepped, sterile technique used

## 2019-06-10 NOTE — PROCEDURE NOTE - NSPOSTCAREGUIDE_GEN_A_CORE
Instructed patient/caregiver regarding signs and symptoms of infection/Verbal/written post procedure instructions were given to patient/caregiver
Verbal/written post procedure instructions were given to patient/caregiver/Instructed patient/caregiver to follow-up with primary care physician

## 2019-06-10 NOTE — CHART NOTE - NSCHARTNOTEFT_GEN_A_CORE
Assessment: Pt seen for acute severe malnutrition follow up. Pt admitted with perforated viscus s/p exploratory laparotomy, s/p gram patch repair, MSSA bacteremia, YUNIEL. Hx of Hep C, ETOH abuse, ETOH related pancreatitis. Pt reported nausea abd poor appetite. Pt stated that he will try to increase PO intake.       Factors impacting intake: [ ] none [ x ] nausea  [ ] vomiting [ ] diarrhea [ ] constipation  [ ]chewing problems [ ] swallowing issues  [x ] other: pain    Diet Prescription: Diet, Regular:   Low Sodium  Supplement Feeding Modality:  Oral  Ensure Enlive Cans or Servings Per Day:  1       Frequency:  Two Times a day (06-07-19 @ 10:08)    Intake: 10% or less     Current Weight: unable to assess dry weights with edema   % Weight Change    Physical appearance: BMI 23.2; Right arm and leg 2+ edema, L arm 3+ edema noted     Pertinent Medications: MEDICATIONS  (STANDING):  cefTRIAXone   IVPB 2 Gram(s) IV Intermittent every 24 hours  chlorhexidine 4% Liquid 1 Application(s) Topical <User Schedule>  heparin  Injectable 5000 Unit(s) SubCutaneous every 12 hours  pantoprazole    Tablet 40 milliGRAM(s) Oral every 12 hours  sodium bicarbonate 650 milliGRAM(s) Oral three times a day  sucralfate 1 Gram(s) Oral every 6 hours    MEDICATIONS  (PRN):  hydrALAZINE 25 milliGRAM(s) Oral three times a day PRN Systolic blood pressure > 140  morphine  - Injectable 2 milliGRAM(s) IV Push every 4 hours PRN Severe Pain (7 - 10)  ondansetron Injectable 4 milliGRAM(s) IV Push every 6 hours PRN Nausea and/or Vomiting  oxyCODONE    5 mG/acetaminophen 325 mG 2 Tablet(s) Oral every 4 hours PRN Moderate Pain (4 - 6)    Pertinent Labs: 06-10 Na 143 mmol/L Glu 98 mg/dL K+ 3.6 mmol/L Cr 7.57 mg/dL<H> BUN 32 mg/dL<H> Phos n/a   Alb n/a   PAB n/a   Hgb 8.3 g/dL<L> Hct 25.9 %<L> HgA1C n/a    Glucose, Serum: 98 mg/dL   24Hr FS:88 mg/dL  84 mg/dL  84 mg/dL  92 mg/dL  74 mg/dL    Skin: stage II sacrum pressure injury     Estimated Needs:   [ x ] no change since previous assessment (05/17)  [ ] recalculated:     Previous Nutrition Diagnosis:   Malnutrition; severe malnutrition in context of acute illness   Related to: inadequate protein-energy intake in setting of gastric perforation, s/p exploratory laparotomy, yusef patch    As evidenced by: <50 protein-energy needs met x ~1month, 2+ & 3+ edema , <50% of meals x 24 days    Nutrition Diagnosis is [ x ] ongoing  [ ] resolved  [ ] improved  [ ] not applicable     Previous goal: Pt to consume >75% of protein-energy needs; maintain wt +/-2# (not met)        New Nutrition Diagnosis: [x ] not applicable           Interventions:   Recommend  [ x ] Continue: current diet Rx  [ ] Change Diet To:  [ ] Nutrition Supplement:  [ ] Nutrition Support:  [ ] Other:     Monitoring and Evaluation:   [ x ] PO intake [ x ] Tolerance to diet prescription [ x ] weights [ x ] labs[ x ] follow up per protocol  [ ] other:

## 2019-06-10 NOTE — PROCEDURE NOTE - NSINDICATIONS_GEN_A_CORE
antibiotic therapy/fluid administration
other/antibiotic therapy/pt needs IV access, pulled out IV from arm
antibiotic therapy/venous access

## 2019-06-10 NOTE — CHART NOTE - NSCHARTNOTEFT_GEN_A_CORE
Pt was seen bedside for follow up, s/p midline catheter insertion earlier this evening complicated by bleeding noted from site.   After hemostasis was achieved following midline insertion, a pressure dressing with abd pad and ACE bandage had been applied. It is found now, clean dry and intact. Removed.  Right upper extremity Midline catheter indwelling with dressing clean dry and intact and clean gauze over site, no blood stain noted. No active bleeding.  B/l arms with pitting edema, nontender. 2+ radial pulses. NVI distally.  May use midline.

## 2019-06-10 NOTE — PROGRESS NOTE ADULT - SUBJECTIVE AND OBJECTIVE BOX
Patient feels ok no n/v; alert; conversive;   Discussed the need to start HD. He expressed resistance, possibly tomorrow.      MEDICATIONS  (STANDING):  cefTRIAXone   IVPB 2 Gram(s) IV Intermittent every 24 hours  chlorhexidine 4% Liquid 1 Application(s) Topical <User Schedule>  heparin  Injectable 5000 Unit(s) SubCutaneous every 12 hours  pantoprazole    Tablet 40 milliGRAM(s) Oral every 12 hours  sodium bicarbonate 650 milliGRAM(s) Oral three times a day  sucralfate 1 Gram(s) Oral every 6 hours    MEDICATIONS  (PRN):  hydrALAZINE 25 milliGRAM(s) Oral three times a day PRN Systolic blood pressure > 140  morphine  - Injectable 2 milliGRAM(s) IV Push every 4 hours PRN Severe Pain (7 - 10)  ondansetron Injectable 4 milliGRAM(s) IV Push every 6 hours PRN Nausea and/or Vomiting  oxyCODONE    5 mG/acetaminophen 325 mG 2 Tablet(s) Oral every 4 hours PRN Moderate Pain (4 - 6)      06-09-19 @ 07:01  -  06-10-19 @ 07:00  --------------------------------------------------------  IN: 100 mL / OUT: 0 mL / NET: 100 mL    06-10-19 @ 07:01  -  06-10-19 @ 14:31  --------------------------------------------------------  IN: 100 mL / OUT: 0 mL / NET: 100 mL      PHYSICAL EXAM:      T(C): 35.3 (06-10-19 @ 12:00), Max: 36.1 (06-10-19 @ 05:00)  HR: 81 (06-10-19 @ 14:26) (80 - 92)  BP: 148/78 (06-10-19 @ 14:26) (137/84 - 159/89)  RR: 19 (06-10-19 @ 14:26) (16 - 19)  SpO2: 99% (06-10-19 @ 14:26) (97% - 99%)  Wt(kg): --  Respiratory: clear anteriorly, decreased BS at bases  Cardiovascular: S1 S2 no rub  Gastrointestinal: soft NT ND +BS  Extremities: 2  edema                                    8.3    11.61 )-----------( 556      ( 10 Cesar 2019 06:13 )             25.9     06-10    143  |  113<H>  |  32<H>  ----------------------------<  98  3.6   |  18<L>  |  7.57<H>    Ca    7.8<L>      10 Cesar 2019 06:13          Creatinine Trend: 7.57<--, 7.10<--, 6.72<--, 6.03<--, 5.52<--, 4.83<--  Assessment and Plan:    YUNIEL -- DDX Gentamicin nephrotoxicity ATN; nafcillin AIN, infectious GN;   Low C3 suggestive of infectious GN, IC process vs occult CTD;   Will tend with JAZMIN pend;  Hep profile C +  Will recommend HD initiation tomorrow as course dictates.

## 2019-06-11 LAB
ANA TITR SER: NEGATIVE — SIGNIFICANT CHANGE UP
ANION GAP SERPL CALC-SCNC: 12 MMOL/L — SIGNIFICANT CHANGE UP (ref 5–17)
BUN SERPL-MCNC: 36 MG/DL — HIGH (ref 7–23)
CALCIUM SERPL-MCNC: 7.3 MG/DL — LOW (ref 8.5–10.1)
CHLORIDE SERPL-SCNC: 117 MMOL/L — HIGH (ref 96–108)
CO2 SERPL-SCNC: 17 MMOL/L — LOW (ref 22–31)
CREAT SERPL-MCNC: 8.38 MG/DL — HIGH (ref 0.5–1.3)
GLUCOSE SERPL-MCNC: 101 MG/DL — HIGH (ref 70–99)
HCT VFR BLD CALC: 21.8 % — LOW (ref 39–50)
HCV RNA FLD QL NAA+PROBE: SIGNIFICANT CHANGE UP
HCV RNA SPEC QL PROBE+SIG AMP: SIGNIFICANT CHANGE UP
HGB BLD-MCNC: 7 G/DL — CRITICAL LOW (ref 13–17)
MAGNESIUM SERPL-MCNC: 2 MG/DL — SIGNIFICANT CHANGE UP (ref 1.6–2.6)
MCHC RBC-ENTMCNC: 30.8 PG — SIGNIFICANT CHANGE UP (ref 27–34)
MCHC RBC-ENTMCNC: 32.1 GM/DL — SIGNIFICANT CHANGE UP (ref 32–36)
MCV RBC AUTO: 96 FL — SIGNIFICANT CHANGE UP (ref 80–100)
NRBC # BLD: 0 /100 WBCS — SIGNIFICANT CHANGE UP (ref 0–0)
PHOSPHATE SERPL-MCNC: 7.6 MG/DL — HIGH (ref 2.5–4.5)
PLATELET # BLD AUTO: 493 K/UL — HIGH (ref 150–400)
POTASSIUM SERPL-MCNC: 3.7 MMOL/L — SIGNIFICANT CHANGE UP (ref 3.5–5.3)
POTASSIUM SERPL-SCNC: 3.7 MMOL/L — SIGNIFICANT CHANGE UP (ref 3.5–5.3)
RBC # BLD: 2.27 M/UL — LOW (ref 4.2–5.8)
RBC # FLD: 21.1 % — HIGH (ref 10.3–14.5)
SODIUM SERPL-SCNC: 146 MMOL/L — HIGH (ref 135–145)
WBC # BLD: 8.72 K/UL — SIGNIFICANT CHANGE UP (ref 3.8–10.5)
WBC # FLD AUTO: 8.72 K/UL — SIGNIFICANT CHANGE UP (ref 3.8–10.5)

## 2019-06-11 PROCEDURE — 99233 SBSQ HOSP IP/OBS HIGH 50: CPT

## 2019-06-11 RX ORDER — CALCIUM ACETATE 667 MG
1334 TABLET ORAL
Refills: 0 | Status: DISCONTINUED | OUTPATIENT
Start: 2019-06-11 | End: 2019-06-21

## 2019-06-11 RX ADMIN — Medication 1334 MILLIGRAM(S): at 17:16

## 2019-06-11 RX ADMIN — Medication 1 GRAM(S): at 17:16

## 2019-06-11 RX ADMIN — HEPARIN SODIUM 5000 UNIT(S): 5000 INJECTION INTRAVENOUS; SUBCUTANEOUS at 05:44

## 2019-06-11 RX ADMIN — OXYCODONE AND ACETAMINOPHEN 2 TABLET(S): 5; 325 TABLET ORAL at 13:06

## 2019-06-11 RX ADMIN — HEPARIN SODIUM 5000 UNIT(S): 5000 INJECTION INTRAVENOUS; SUBCUTANEOUS at 17:16

## 2019-06-11 RX ADMIN — Medication 650 MILLIGRAM(S): at 05:44

## 2019-06-11 RX ADMIN — CHLORHEXIDINE GLUCONATE 1 APPLICATION(S): 213 SOLUTION TOPICAL at 07:40

## 2019-06-11 RX ADMIN — Medication 650 MILLIGRAM(S): at 13:02

## 2019-06-11 RX ADMIN — Medication 1 GRAM(S): at 22:19

## 2019-06-11 RX ADMIN — OXYCODONE AND ACETAMINOPHEN 2 TABLET(S): 5; 325 TABLET ORAL at 14:44

## 2019-06-11 RX ADMIN — CEFTRIAXONE 100 GRAM(S): 500 INJECTION, POWDER, FOR SOLUTION INTRAMUSCULAR; INTRAVENOUS at 17:18

## 2019-06-11 RX ADMIN — PANTOPRAZOLE SODIUM 40 MILLIGRAM(S): 20 TABLET, DELAYED RELEASE ORAL at 07:04

## 2019-06-11 RX ADMIN — Medication 650 MILLIGRAM(S): at 22:19

## 2019-06-11 NOTE — PROGRESS NOTE ADULT - ASSESSMENT
59 yo M w/ history of gout, ETOH abuse, chronic pancreatitis, hep C p/w YUNIEL & sepsis POA from gastric perforation and purulent peritonitis & had ex-lap, yusef patch repair, peritoneal lavage on 5/16. His post op course was complicated by intraabdominal collection s/p IR perc drainage on 5/22 & MSSA bacteremia & YUNIEL.      YUNIEL  - initially due to ATN, now renal suspects Gentamicin nephrotoxicity, AIN, septic ATN  - renal following, will start HD tomorrow    Post operative anemia on chronic anemia  - watch Hgb and transfuse if Hgb<7, may transfuse tomorrow w/ HD  - received 3 units on 5/16 & 1 unit on 6/2    Poor PO intake  - nutrition consult  - patient reports abd pain when eating, will Follow up w/ surgery    Purulent peritonitis & gastric perforation w/ sepsis POA and persistent MSSA bacteremia  - leukocytosis improving  - retrogastric collection and/or lateral perihepatic/subcapsular collection drained by IR on 5/22 (250cc drained)  - OR & blood cultures grew MSSA, NGTD on repeat blood cx from 5/28  - nafcillin and flagyl witched to Ceftriaxone, due to worsening renal function  - TTE on 5/21 showed EF 60-65% w/ no evidence of valvular vegetation & ISAIAS on 6/4 was negative  - MRI of thoracolumbar spine was unremarkable & did not show diskitis /OM, Cervical spine MRI only showed chronic degenerative changes of C3-C6, with multilevel sac effacement   - CT showed a focal thickening at the hepatic flexure, GI will do colonoscopy tomorrow to evaluate for possible hepatic flexure mass  - CEA was 4.2  - colonoscopy on 6/30 had poor to fair prep and showed transverse colon extrinsic adhesions, GI recommended repeat in 1 year      Gastric perforation status post ex-lap, yusef patch repair, peritoneal lavage on 5/16  - status post extubation on 5/17  - UGIS showed no leak or extravasation on 5/23  - transferred out of ICU on 5/23  - care as per surgery  - c/w percocet for pain    Prophylaxis:  DVT: heparin   GI: Carafate and Protonix

## 2019-06-11 NOTE — PROVIDER CONTACT NOTE (CRITICAL VALUE NOTIFICATION) - ACTION/TREATMENT ORDERED:
continue same.
possible dialysis. monitor bleeding
will follow up.
will follow up.
PA made aware no interventions ordered at this time.

## 2019-06-11 NOTE — PROGRESS NOTE ADULT - SUBJECTIVE AND OBJECTIVE BOX
59 yo M w/ history of gout, ETOH abuse, chronic pancreatitis, hep C p/w YUNIEL & sepsis POA from gastric perforation and purulent peritonitis & had ex-lap, yusef patch repair, peritoneal lavage on 5/16. His post op course was complicated by intraabdominal collection s/p IR perc drainage on 5/22 & MSSA bacteremia & YUNIEL. He is lying in bed in NAD.     MEDICATIONS  (STANDING):  calcium acetate 1334 milliGRAM(s) Oral three times a day with meals  cefTRIAXone   IVPB 2 Gram(s) IV Intermittent every 24 hours  chlorhexidine 4% Liquid 1 Application(s) Topical <User Schedule>  heparin  Injectable 5000 Unit(s) SubCutaneous every 12 hours  pantoprazole    Tablet 40 milliGRAM(s) Oral every 12 hours  sodium bicarbonate 650 milliGRAM(s) Oral three times a day  sucralfate 1 Gram(s) Oral every 6 hours    MEDICATIONS  (PRN):  ondansetron Injectable 4 milliGRAM(s) IV Push every 6 hours PRN Nausea and/or Vomiting  oxyCODONE    5 mG/acetaminophen 325 mG 2 Tablet(s) Oral every 4 hours PRN Moderate Pain (4 - 6)      Allergies    No Known Allergies    Intolerances        Vital Signs Last 24 Hrs  T(C): 36.6 (11 Jun 2019 17:34), Max: 36.6 (10 Cesar 2019 19:09)  T(F): 97.9 (11 Jun 2019 17:34), Max: 97.9 (10 Cesar 2019 19:09)  HR: 72 (11 Jun 2019 17:34) (72 - 94)  BP: 139/72 (11 Jun 2019 17:34) (116/78 - 178/88)  BP(mean): --  RR: 16 (11 Jun 2019 17:34) (16 - 18)  SpO2: 96% (11 Jun 2019 17:34) (96% - 99%)    PHYSICAL EXAM:  GENERAL: NAD, well-groomed, well-developed  HEAD:  Atraumatic, Normocephalic  EYES: EOMI, PERRLA   NECK: Supple   NERVOUS SYSTEM: Alert    CHEST/LUNG: Clear to auscultation bilaterally; No rales, rhonchi, wheezing, or rubs  HEART: Regular rate and rhythm; No murmurs, rubs, or gallops  ABDOMEN: Soft, Nontender, Nondistended; Bowel sounds present, well healing surgical scar  EXTREMITIES: No clubbing, cyanosis, or edema    LABS:                        7.0    8.72  )-----------( 493      ( 11 Jun 2019 09:48 )             21.8     06-11    146<H>  |  117<H>  |  36<H>  ----------------------------<  101<H>  3.7   |  17<L>  |  8.38<H>    Ca    7.3<L>      11 Jun 2019 09:48  Phos  7.6     06-11  Mg     2.0     06-11          CAPILLARY BLOOD GLUCOSE      POCT Blood Glucose.: 103 mg/dL (11 Jun 2019 15:52)  POCT Blood Glucose.: 91 mg/dL (11 Jun 2019 10:48)  POCT Blood Glucose.: 87 mg/dL (11 Jun 2019 07:32)  POCT Blood Glucose.: 86 mg/dL (10 Cesar 2019 23:19)      RADIOLOGY & ADDITIONAL TESTS:    06-10-19 @ 07:01  -  06-11-19 @ 07:00  --------------------------------------------------------  IN:    Oral Fluid: 100 mL  Total IN: 100 mL    OUT:    Bulb: 1 mL  Total OUT: 1 mL    Total NET: 99 mL

## 2019-06-11 NOTE — PROGRESS NOTE ADULT - SUBJECTIVE AND OBJECTIVE BOX
Patient with poor appetite; no acute distress  Increasing edema;       MEDICATIONS  (STANDING):  cefTRIAXone   IVPB 2 Gram(s) IV Intermittent every 24 hours  chlorhexidine 4% Liquid 1 Application(s) Topical <User Schedule>  heparin  Injectable 5000 Unit(s) SubCutaneous every 12 hours  pantoprazole    Tablet 40 milliGRAM(s) Oral every 12 hours  sodium bicarbonate 650 milliGRAM(s) Oral three times a day  sucralfate 1 Gram(s) Oral every 6 hours    MEDICATIONS  (PRN):  ondansetron Injectable 4 milliGRAM(s) IV Push every 6 hours PRN Nausea and/or Vomiting  oxyCODONE    5 mG/acetaminophen 325 mG 2 Tablet(s) Oral every 4 hours PRN Moderate Pain (4 - 6)      06-10-19 @ 07:01  -  06-11-19 @ 07:00  --------------------------------------------------------  IN: 100 mL / OUT: 1 mL / NET: 99 mL      PHYSICAL EXAM:      T(C): 35.4 (06-11-19 @ 11:47), Max: 36.6 (06-10-19 @ 19:09)  HR: 80 (06-11-19 @ 12:45) (72 - 94)  BP: 116/78 (06-11-19 @ 12:45) (116/78 - 178/88)  RR: 18 (06-11-19 @ 11:47) (16 - 18)  SpO2: 97% (06-11-19 @ 12:45) (96% - 99%)  Wt(kg): --  Respiratory: clear anteriorly, decreased BS at bases  Cardiovascular: S1 S2  Gastrointestinal: soft NT ND +BS  Extremities:  2 edema                                    7.0    8.72  )-----------( 493      ( 11 Jun 2019 09:48 )             21.8     06-11    146<H>  |  117<H>  |  36<H>  ----------------------------<  101<H>  3.7   |  17<L>  |  8.38<H>    Ca    7.3<L>      11 Jun 2019 09:48  Phos  7.6     06-11  Mg     2.0     06-11          Creatinine Trend: 8.38<--, 7.57<--, 7.10<--, 6.72<--, 6.03<--, 5.52<--  Assessment and Plan:  YUNIEL -- DDX infectious GN;  Gentamicin nephrotoxicity ATN; nafcillin AIN,  Low C3 suggestive of infectious GN, IC process vs occult CTD;   Will trend C3,  with JAZMIN  Hep profile C +; cryoglobulins;   Discussed with patient and mother, agreed to initiate HD tomorrow as patient increasing uremic sx and edema.

## 2019-06-12 LAB
ALBUMIN SERPL ELPH-MCNC: 0.7 G/DL — LOW (ref 3.3–5)
ALP SERPL-CCNC: 387 U/L — HIGH (ref 40–120)
ALT FLD-CCNC: 8 U/L — LOW (ref 12–78)
ANION GAP SERPL CALC-SCNC: 13 MMOL/L — SIGNIFICANT CHANGE UP (ref 5–17)
AST SERPL-CCNC: 13 U/L — LOW (ref 15–37)
BILIRUB SERPL-MCNC: 0.5 MG/DL — SIGNIFICANT CHANGE UP (ref 0.2–1.2)
BUN SERPL-MCNC: 36 MG/DL — HIGH (ref 7–23)
C3 SERPL-MCNC: 70 MG/DL — LOW (ref 81–157)
C4 SERPL-MCNC: 35 MG/DL — SIGNIFICANT CHANGE UP (ref 13–39)
CALCIUM SERPL-MCNC: 7.7 MG/DL — LOW (ref 8.5–10.1)
CHLORIDE SERPL-SCNC: 116 MMOL/L — HIGH (ref 96–108)
CO2 SERPL-SCNC: 18 MMOL/L — LOW (ref 22–31)
CREAT SERPL-MCNC: 8.29 MG/DL — HIGH (ref 0.5–1.3)
GLUCOSE SERPL-MCNC: 96 MG/DL — SIGNIFICANT CHANGE UP (ref 70–99)
HAV IGM SER-ACNC: SIGNIFICANT CHANGE UP
HBV CORE IGM SER-ACNC: SIGNIFICANT CHANGE UP
HBV SURFACE AB SER-ACNC: REACTIVE
HBV SURFACE AG SER-ACNC: SIGNIFICANT CHANGE UP
HCT VFR BLD CALC: 24.7 % — LOW (ref 39–50)
HCV AB S/CO SERPL IA: 8.61 S/CO — HIGH (ref 0–0.99)
HCV AB SERPL-IMP: REACTIVE
HGB BLD-MCNC: 8 G/DL — LOW (ref 13–17)
MAGNESIUM SERPL-MCNC: 2.3 MG/DL — SIGNIFICANT CHANGE UP (ref 1.6–2.6)
MCHC RBC-ENTMCNC: 30.5 PG — SIGNIFICANT CHANGE UP (ref 27–34)
MCHC RBC-ENTMCNC: 32.4 GM/DL — SIGNIFICANT CHANGE UP (ref 32–36)
MCV RBC AUTO: 94.3 FL — SIGNIFICANT CHANGE UP (ref 80–100)
NRBC # BLD: 0 /100 WBCS — SIGNIFICANT CHANGE UP (ref 0–0)
PLATELET # BLD AUTO: 558 K/UL — HIGH (ref 150–400)
POTASSIUM SERPL-MCNC: 3.5 MMOL/L — SIGNIFICANT CHANGE UP (ref 3.5–5.3)
POTASSIUM SERPL-SCNC: 3.5 MMOL/L — SIGNIFICANT CHANGE UP (ref 3.5–5.3)
PROT SERPL-MCNC: 5.9 GM/DL — LOW (ref 6–8.3)
RBC # BLD: 2.62 M/UL — LOW (ref 4.2–5.8)
RBC # FLD: 21.3 % — HIGH (ref 10.3–14.5)
RHEUMATOID FACT SERPL-ACNC: <10 IU/ML — SIGNIFICANT CHANGE UP (ref 0–13)
SODIUM SERPL-SCNC: 147 MMOL/L — HIGH (ref 135–145)
WBC # BLD: 8.26 K/UL — SIGNIFICANT CHANGE UP (ref 3.8–10.5)
WBC # FLD AUTO: 8.26 K/UL — SIGNIFICANT CHANGE UP (ref 3.8–10.5)

## 2019-06-12 PROCEDURE — 99233 SBSQ HOSP IP/OBS HIGH 50: CPT

## 2019-06-12 PROCEDURE — 77001 FLUOROGUIDE FOR VEIN DEVICE: CPT | Mod: 26

## 2019-06-12 PROCEDURE — 76937 US GUIDE VASCULAR ACCESS: CPT | Mod: 26

## 2019-06-12 PROCEDURE — 36556 INSERT NON-TUNNEL CV CATH: CPT

## 2019-06-12 RX ORDER — OXYCODONE AND ACETAMINOPHEN 5; 325 MG/1; MG/1
1 TABLET ORAL ONCE
Refills: 0 | Status: DISCONTINUED | OUTPATIENT
Start: 2019-06-12 | End: 2019-06-12

## 2019-06-12 RX ORDER — TRAMADOL HYDROCHLORIDE 50 MG/1
50 TABLET ORAL EVERY 12 HOURS
Refills: 0 | Status: DISCONTINUED | OUTPATIENT
Start: 2019-06-12 | End: 2019-06-12

## 2019-06-12 RX ADMIN — Medication 1334 MILLIGRAM(S): at 08:32

## 2019-06-12 RX ADMIN — Medication 650 MILLIGRAM(S): at 22:41

## 2019-06-12 RX ADMIN — OXYCODONE AND ACETAMINOPHEN 1 TABLET(S): 5; 325 TABLET ORAL at 16:15

## 2019-06-12 RX ADMIN — TRAMADOL HYDROCHLORIDE 50 MILLIGRAM(S): 50 TABLET ORAL at 23:50

## 2019-06-12 RX ADMIN — HEPARIN SODIUM 5000 UNIT(S): 5000 INJECTION INTRAVENOUS; SUBCUTANEOUS at 18:28

## 2019-06-12 RX ADMIN — Medication 650 MILLIGRAM(S): at 13:19

## 2019-06-12 RX ADMIN — HEPARIN SODIUM 5000 UNIT(S): 5000 INJECTION INTRAVENOUS; SUBCUTANEOUS at 06:22

## 2019-06-12 RX ADMIN — OXYCODONE AND ACETAMINOPHEN 1 TABLET(S): 5; 325 TABLET ORAL at 13:13

## 2019-06-12 RX ADMIN — OXYCODONE AND ACETAMINOPHEN 1 TABLET(S): 5; 325 TABLET ORAL at 15:45

## 2019-06-12 RX ADMIN — CEFTRIAXONE 100 GRAM(S): 500 INJECTION, POWDER, FOR SOLUTION INTRAMUSCULAR; INTRAVENOUS at 18:28

## 2019-06-12 RX ADMIN — Medication 650 MILLIGRAM(S): at 06:22

## 2019-06-12 RX ADMIN — Medication 1334 MILLIGRAM(S): at 12:12

## 2019-06-12 RX ADMIN — Medication 1334 MILLIGRAM(S): at 18:29

## 2019-06-12 RX ADMIN — CHLORHEXIDINE GLUCONATE 1 APPLICATION(S): 213 SOLUTION TOPICAL at 08:31

## 2019-06-12 RX ADMIN — OXYCODONE AND ACETAMINOPHEN 1 TABLET(S): 5; 325 TABLET ORAL at 12:13

## 2019-06-12 RX ADMIN — PANTOPRAZOLE SODIUM 40 MILLIGRAM(S): 20 TABLET, DELAYED RELEASE ORAL at 08:32

## 2019-06-12 RX ADMIN — PANTOPRAZOLE SODIUM 40 MILLIGRAM(S): 20 TABLET, DELAYED RELEASE ORAL at 18:29

## 2019-06-12 NOTE — PROGRESS NOTE ADULT - ASSESSMENT
59 yo M w/ history of gout, ETOH abuse, chronic pancreatitis, hep C p/w YUNIEL & sepsis POA from gastric perforation and purulent peritonitis & had ex-lap, yusef patch repair, peritoneal lavage on 5/16. His post op course was complicated by intraabdominal collection s/p IR perc drainage on 5/22 & MSSA bacteremia & YUNIEL.      YUNIEL  - initially due to ATN, now renal suspects Gentamicin nephrotoxicity, AIN, septic ATN  - renal following, will start HD today    Post operative anemia on chronic anemia  - watch Hgb and transfuse if Hgb<7   - received 3 units on 5/16 & 1 unit on 6/2    Poor PO intake  - nutrition consult  - patient reports abd pain when eating, will Follow up w/ surgery    Purulent peritonitis & gastric perforation w/ sepsis POA and persistent MSSA bacteremia  - leukocytosis improving  - retrogastric collection and/or lateral perihepatic/subcapsular collection drained by IR on 5/22 (250cc drained)  - OR & blood cultures grew MSSA, NGTD on repeat blood cx from 5/28  - nafcillin and flagyl witched to Ceftriaxone, due to worsening renal function  - TTE on 5/21 showed EF 60-65% w/ no evidence of valvular vegetation & ISAIAS on 6/4 was negative  - MRI of thoracolumbar spine was unremarkable & did not show diskitis /OM, Cervical spine MRI only showed chronic degenerative changes of C3-C6, with multilevel sac effacement   - CT showed a focal thickening at the hepatic flexure, GI will do colonoscopy tomorrow to evaluate for possible hepatic flexure mass  - CEA was 4.2  - colonoscopy on 6/30 had poor to fair prep and showed transverse colon extrinsic adhesions, GI recommended repeat in 1 year      Gastric perforation status post ex-lap, yusef patch repair, peritoneal lavage on 5/16  - status post extubation on 5/17  - UGIS showed no leak or extravasation on 5/23  - transferred out of ICU on 5/23  - care as per surgery  - add ultram for pain    Prophylaxis:  DVT: heparin   GI: Carafate and Protonix

## 2019-06-12 NOTE — PROGRESS NOTE ADULT - ASSESSMENT
Plan:   -58y Male presents for non-tunneled dialysis catheter, procedure to be performed today  -Risks/Benefits/alternatives explained with the patient and/or healthcare proxy and witnessed informed consent obtained.   Meds, vitals and labs reviewed

## 2019-06-12 NOTE — PROGRESS NOTE ADULT - SUBJECTIVE AND OBJECTIVE BOX
Patient with hiccups, poor appetite periods of nausea;      MEDICATIONS  (STANDING):  calcium acetate 1334 milliGRAM(s) Oral three times a day with meals  cefTRIAXone   IVPB 2 Gram(s) IV Intermittent every 24 hours  chlorhexidine 4% Liquid 1 Application(s) Topical <User Schedule>  heparin  Injectable 5000 Unit(s) SubCutaneous every 12 hours  pantoprazole    Tablet 40 milliGRAM(s) Oral every 12 hours  sodium bicarbonate 650 milliGRAM(s) Oral three times a day  sucralfate 1 Gram(s) Oral every 6 hours    MEDICATIONS  (PRN):  ondansetron Injectable 4 milliGRAM(s) IV Push every 6 hours PRN Nausea and/or Vomiting  traMADol 50 milliGRAM(s) Oral every 12 hours PRN Moderate Pain (4 - 6)      06-12-19 @ 07:01  -  06-12-19 @ 19:07  --------------------------------------------------------  IN: 240 mL / OUT: 1650 mL / NET: -1410 mL      PHYSICAL EXAM:      T(C): 36.3 (06-12-19 @ 17:15), Max: 36.5 (06-12-19 @ 05:55)  HR: 93 (06-12-19 @ 17:15) (85 - 93)  BP: 113/63 (06-12-19 @ 17:15) (113/63 - 167/92)  RR: 20 (06-12-19 @ 17:15) (16 - 20)  SpO2: 94% (06-12-19 @ 17:15) (94% - 100%)  Wt(kg): --  Respiratory: clear anteriorly, decreased BS at bases  Cardiovascular: S1 S2  Gastrointestinal: soft NT ND +BS  Extremities:   2-3 edema                                    8.0    8.26  )-----------( 558      ( 12 Jun 2019 07:57 )             24.7     06-12    147<H>  |  116<H>  |  36<H>  ----------------------------<  96  3.5   |  18<L>  |  8.29<H>    Ca    7.7<L>      12 Jun 2019 07:57  Phos  7.6     06-11  Mg     2.3     06-12    TPro  5.9<L>  /  Alb  0.7<L>  /  TBili  0.5  /  DBili  x   /  AST  13<L>  /  ALT  8<L>  /  AlkPhos  387<H>  06-12      LIVER FUNCTIONS - ( 12 Jun 2019 07:57 )  Alb: 0.7 g/dL / Pro: 5.9 gm/dL / ALK PHOS: 387 U/L / ALT: 8 U/L / AST: 13 U/L / GGT: x           Creatinine Trend: 8.29<--, 8.38<--, 7.57<--, 7.10<--, 6.72<--, 6.03<--  Assessment and Plan:    YUNIEL -- DDX infectious GN;  Gentamicin nephrotoxicity ATN; nafcillin AIN,  Low C3 suggestive of infectious GN, IC process vs occult CTD;   Initiate HD today for worsening uremic rx;   UF as tolerated ;  Percocet for post stacia pain;   Will follow.

## 2019-06-12 NOTE — PROGRESS NOTE ADULT - PROBLEM SELECTOR PLAN 1
sec to stomach perforation s/p yusef patch repair   initial OR c/s and blood c/+ MSSA  cont Rocephin(day 24 of 28 )    ok for midline as no access  cr worsening ,will hold off on any more genta  noted to have draining from one of drain site   Mild leukocytosis noted today   follow wbc trend and temp curve sec to stomach perforation s/p yusef patch repair   initial OR c/s and blood c/+ MSSA  cont Rocephin(day 24 of 28 )    ok for midline as no access  cr worsening ,will hold off on any more genta  follow wbc trend and temp curve

## 2019-06-12 NOTE — PROGRESS NOTE ADULT - SUBJECTIVE AND OBJECTIVE BOX
Vascular & Interventional Radiology Pre-Procedure Note    Procedure Name: _______non-tunneled dialysis catheter placement    HPI: 58y Male with ____worsening YUNIEL and edema, now needs to initiate dialysis    Allergies:   Medications (Abx/Cardiac/Anticoagulation/Blood Products)  cefTRIAXone   IVPB: 100 mL/Hr IV Intermittent (06-11 @ 17:18)  heparin  Injectable: 5000 Unit(s) SubCutaneous (06-12 @ 06:22)    Data:    T(C): 35.6  HR: 85  BP: 152/96  RR: 17  SpO2: 100%        -WBC 8.26 / HgB 8.0 / Hct 24.7 / Plt 558  -Na 147 / Cl 116 / BUN 36 / Glucose 96  -K 3.5 / CO2 18 / Cr 8.29  -ALT 8 / Alk Phos 387 / T.Bili 0.5    Imaging: _______

## 2019-06-12 NOTE — PROGRESS NOTE ADULT - SUBJECTIVE AND OBJECTIVE BOX
Patient is a 58y old  Male who presents with a chief complaint of Abdominal pain, vomiting (12 Jun 2019 13:29)      INTERVAL HPI / OVERNIGHT EVENTS: doing ok     MEDICATIONS  (STANDING):  calcium acetate 1334 milliGRAM(s) Oral three times a day with meals  cefTRIAXone   IVPB 2 Gram(s) IV Intermittent every 24 hours  chlorhexidine 4% Liquid 1 Application(s) Topical <User Schedule>  heparin  Injectable 5000 Unit(s) SubCutaneous every 12 hours  pantoprazole    Tablet 40 milliGRAM(s) Oral every 12 hours  sodium bicarbonate 650 milliGRAM(s) Oral three times a day  sucralfate 1 Gram(s) Oral every 6 hours    MEDICATIONS  (PRN):  ondansetron Injectable 4 milliGRAM(s) IV Push every 6 hours PRN Nausea and/or Vomiting      Vital Signs Last 24 Hrs  T(C): 35.6 (12 Jun 2019 12:32), Max: 36.6 (11 Jun 2019 17:34)  T(F): 96 (12 Jun 2019 12:32), Max: 97.9 (11 Jun 2019 17:34)  HR: 85 (12 Jun 2019 12:32) (72 - 86)  BP: 152/96 (12 Jun 2019 12:32) (139/72 - 167/92)  BP(mean): --  RR: 17 (12 Jun 2019 12:32) (16 - 18)  SpO2: 100% (12 Jun 2019 12:32) (96% - 100%)    Review of systems:  General : no fever /chills,fatigue  CVS : no chest pain, palpitations  Lungs : no shortness of breath, cough  GI : + abdominal pain, vomiting, diarrhea   : no dysuria, hematuria        PHYSICAL EXAM:  General :NAD  Constitutional: well-groomed, well-developed  Respiratory: CTAB/L  Cardiovascular: S1 and S2, RRR, no M/G/R  Gastrointestinal: s/p Ex Lap, RUQ drain +,  BS+, soft, NT/ND  Extremities: No peripheral edema  Vascular: 2+ peripheral pulses  Skin: No rashes      LABS:                        8.0    8.26  )-----------( 558      ( 12 Jun 2019 07:57 )             24.7     06-12    147<H>  |  116<H>  |  36<H>  ----------------------------<  96  3.5   |  18<L>  |  8.29<H>    Ca    7.7<L>      12 Jun 2019 07:57  Phos  7.6     06-11  Mg     2.3     06-12    TPro  5.9<L>  /  Alb  0.7<L>  /  TBili  0.5  /  DBili  x   /  AST  13<L>  /  ALT  8<L>  /  AlkPhos  387<H>  06-12          MICROBIOLOGY:  RECENT CULTURES:        RADIOLOGY & ADDITIONAL STUDIES:

## 2019-06-12 NOTE — PROCEDURE NOTE - ADDITIONAL PROCEDURE DETAILS
pt s/p non-tunneled dialysis catheter placement with local lidocaine.  Inserted into right subclavian vein via supraclavicular approach under US guidance and catheter tip confirmed at the cavo-atrial junction with fluoro guidance.  14fr x 15cm DL Angiodynamics.  Hemostasis achieved.  Pt tolerated procedure well.  VSS  Blood loss- minimal  -Catheter can be accessed, management as per nephrology  -Will convert to tunneled when cleared by ID and nephrology
Patient seen at bedside for midline catheter placement.  Consent obtained from patient after risks/benefits/alternatives explained.   Upper extremity prepped and draped in usual sterile fashion. Time out performed with RN. 4Fr 12 cm single lumen midline catheter placed using ultrasound guided Seldinger technique, R basilic vein. Flushes well, with good venous return. Patient tolerated procedure well without complication and was left in no acute distress. Upper arm circumference noted to be 36

## 2019-06-12 NOTE — PROGRESS NOTE ADULT - SUBJECTIVE AND OBJECTIVE BOX
59 yo M w/ history of gout, ETOH abuse, chronic pancreatitis, hep C p/w YUNIEL & sepsis POA from gastric perforation and purulent peritonitis & had ex-lap, yusef patch repair, peritoneal lavage on 5/16. His post op course was complicated by intraabdominal collection s/p IR perc drainage on 5/22 & MSSA bacteremia & YUNIEL. He is lying in bed in NAD.     MEDICATIONS  (STANDING):  calcium acetate 1334 milliGRAM(s) Oral three times a day with meals  cefTRIAXone   IVPB 2 Gram(s) IV Intermittent every 24 hours  chlorhexidine 4% Liquid 1 Application(s) Topical <User Schedule>  heparin  Injectable 5000 Unit(s) SubCutaneous every 12 hours  pantoprazole    Tablet 40 milliGRAM(s) Oral every 12 hours  sodium bicarbonate 650 milliGRAM(s) Oral three times a day  sucralfate 1 Gram(s) Oral every 6 hours    MEDICATIONS  (PRN):  ondansetron Injectable 4 milliGRAM(s) IV Push every 6 hours PRN Nausea and/or Vomiting  traMADol 50 milliGRAM(s) Oral every 12 hours PRN Moderate Pain (4 - 6)      Allergies    No Known Allergies    Intolerances        Vital Signs Last 24 Hrs  T(C): 36.3 (12 Jun 2019 15:15), Max: 36.5 (12 Jun 2019 05:55)  T(F): 97.3 (12 Jun 2019 15:15), Max: 97.7 (12 Jun 2019 05:55)  HR: 87 (12 Jun 2019 15:15) (85 - 87)  BP: 145/87 (12 Jun 2019 15:15) (145/87 - 167/92)  BP(mean): --  RR: 16 (12 Jun 2019 15:15) (16 - 18)  SpO2: 98% (12 Jun 2019 15:15) (98% - 100%)    PHYSICAL EXAM:  GENERAL: NAD, well-groomed, well-developed  HEAD:  Atraumatic, Normocephalic  EYES: EOMI, PERRLA   NECK: Supple   NERVOUS SYSTEM: Alert    CHEST/LUNG: Clear to auscultation bilaterally; No rales, rhonchi, wheezing, or rubs  HEART: Regular rate and rhythm; No murmurs, rubs, or gallops  ABDOMEN: Soft, Nontender, Nondistended; Bowel sounds present, well healing surgical scar  EXTREMITIES: No clubbing, cyanosis, or edema    LABS:                        8.0    8.26  )-----------( 558      ( 12 Jun 2019 07:57 )             24.7     06-12    147<H>  |  116<H>  |  36<H>  ----------------------------<  96  3.5   |  18<L>  |  8.29<H>    Ca    7.7<L>      12 Jun 2019 07:57  Phos  7.6     06-11  Mg     2.3     06-12    TPro  5.9<L>  /  Alb  0.7<L>  /  TBili  0.5  /  DBili  x   /  AST  13<L>  /  ALT  8<L>  /  AlkPhos  387<H>  06-12        CAPILLARY BLOOD GLUCOSE      POCT Blood Glucose.: 101 mg/dL (12 Jun 2019 10:46)  POCT Blood Glucose.: 91 mg/dL (12 Jun 2019 07:24)  POCT Blood Glucose.: 97 mg/dL (11 Jun 2019 22:32)      RADIOLOGY & ADDITIONAL TESTS:    06-12-19 @ 07:01  -  06-12-19 @ 17:35  --------------------------------------------------------  IN:    Oral Fluid: 240 mL  Total IN: 240 mL    OUT:    Voided: 150 mL  Total OUT: 150 mL    Total NET: 90 mL

## 2019-06-13 LAB
ANION GAP SERPL CALC-SCNC: 11 MMOL/L — SIGNIFICANT CHANGE UP (ref 5–17)
BUN SERPL-MCNC: 25 MG/DL — HIGH (ref 7–23)
CALCIUM SERPL-MCNC: 7.4 MG/DL — LOW (ref 8.5–10.1)
CHLORIDE SERPL-SCNC: 112 MMOL/L — HIGH (ref 96–108)
CO2 SERPL-SCNC: 23 MMOL/L — SIGNIFICANT CHANGE UP (ref 22–31)
CREAT SERPL-MCNC: 6.1 MG/DL — HIGH (ref 0.5–1.3)
GLUCOSE SERPL-MCNC: 106 MG/DL — HIGH (ref 70–99)
HCT VFR BLD CALC: 21 % — CRITICAL LOW (ref 39–50)
HCV RNA FLD QL NAA+PROBE: SIGNIFICANT CHANGE UP
HCV RNA SPEC QL PROBE+SIG AMP: SIGNIFICANT CHANGE UP
HGB BLD-MCNC: 6.6 G/DL — CRITICAL LOW (ref 13–17)
MAGNESIUM SERPL-MCNC: 2.1 MG/DL — SIGNIFICANT CHANGE UP (ref 1.6–2.6)
MCHC RBC-ENTMCNC: 30.1 PG — SIGNIFICANT CHANGE UP (ref 27–34)
MCHC RBC-ENTMCNC: 31.4 GM/DL — LOW (ref 32–36)
MCV RBC AUTO: 95.9 FL — SIGNIFICANT CHANGE UP (ref 80–100)
PLATELET # BLD AUTO: 360 K/UL — SIGNIFICANT CHANGE UP (ref 150–400)
POTASSIUM SERPL-MCNC: 3.1 MMOL/L — LOW (ref 3.5–5.3)
POTASSIUM SERPL-SCNC: 3.1 MMOL/L — LOW (ref 3.5–5.3)
RBC # BLD: 2.19 M/UL — LOW (ref 4.2–5.8)
RBC # FLD: 21.3 % — HIGH (ref 10.3–14.5)
SODIUM SERPL-SCNC: 146 MMOL/L — HIGH (ref 135–145)
WBC # BLD: 2.94 K/UL — LOW (ref 3.8–10.5)
WBC # FLD AUTO: 2.94 K/UL — LOW (ref 3.8–10.5)

## 2019-06-13 PROCEDURE — 99233 SBSQ HOSP IP/OBS HIGH 50: CPT

## 2019-06-13 PROCEDURE — 99232 SBSQ HOSP IP/OBS MODERATE 35: CPT

## 2019-06-13 RX ORDER — OXYCODONE AND ACETAMINOPHEN 5; 325 MG/1; MG/1
2 TABLET ORAL EVERY 4 HOURS
Refills: 0 | Status: DISCONTINUED | OUTPATIENT
Start: 2019-06-13 | End: 2019-06-20

## 2019-06-13 RX ADMIN — CHLORHEXIDINE GLUCONATE 1 APPLICATION(S): 213 SOLUTION TOPICAL at 08:17

## 2019-06-13 RX ADMIN — Medication 1334 MILLIGRAM(S): at 18:52

## 2019-06-13 RX ADMIN — OXYCODONE AND ACETAMINOPHEN 2 TABLET(S): 5; 325 TABLET ORAL at 12:45

## 2019-06-13 RX ADMIN — OXYCODONE AND ACETAMINOPHEN 2 TABLET(S): 5; 325 TABLET ORAL at 11:01

## 2019-06-13 RX ADMIN — OXYCODONE AND ACETAMINOPHEN 2 TABLET(S): 5; 325 TABLET ORAL at 18:51

## 2019-06-13 RX ADMIN — HEPARIN SODIUM 5000 UNIT(S): 5000 INJECTION INTRAVENOUS; SUBCUTANEOUS at 18:52

## 2019-06-13 RX ADMIN — OXYCODONE AND ACETAMINOPHEN 2 TABLET(S): 5; 325 TABLET ORAL at 23:20

## 2019-06-13 RX ADMIN — Medication 1 GRAM(S): at 18:52

## 2019-06-13 RX ADMIN — HEPARIN SODIUM 5000 UNIT(S): 5000 INJECTION INTRAVENOUS; SUBCUTANEOUS at 06:23

## 2019-06-13 RX ADMIN — PANTOPRAZOLE SODIUM 40 MILLIGRAM(S): 20 TABLET, DELAYED RELEASE ORAL at 18:52

## 2019-06-13 RX ADMIN — Medication 650 MILLIGRAM(S): at 06:23

## 2019-06-13 RX ADMIN — TRAMADOL HYDROCHLORIDE 50 MILLIGRAM(S): 50 TABLET ORAL at 00:30

## 2019-06-13 RX ADMIN — Medication 1334 MILLIGRAM(S): at 11:02

## 2019-06-13 RX ADMIN — Medication 1 GRAM(S): at 11:01

## 2019-06-13 RX ADMIN — CEFTRIAXONE 100 GRAM(S): 500 INJECTION, POWDER, FOR SOLUTION INTRAMUSCULAR; INTRAVENOUS at 18:52

## 2019-06-13 RX ADMIN — OXYCODONE AND ACETAMINOPHEN 2 TABLET(S): 5; 325 TABLET ORAL at 19:10

## 2019-06-13 RX ADMIN — PANTOPRAZOLE SODIUM 40 MILLIGRAM(S): 20 TABLET, DELAYED RELEASE ORAL at 08:17

## 2019-06-13 NOTE — PROGRESS NOTE ADULT - SUBJECTIVE AND OBJECTIVE BOX
Patient s/p non-tunneled dialysis catheter placement.  Pt with YUNIEL, HD initiated.  Pt denies any new complaints today.  Insertion site is CDI, there is no swelling to neck, chest or upper extremity.  As per dialysis notes the catheter worked optimally.  Vitals are stable.   No new labs today.      PHYSICAL EXAM:    Vital Signs Last 24 Hrs  T(C): 36.8 (13 Jun 2019 06:15), Max: 37.3 (12 Jun 2019 23:59)  T(F): 98.2 (13 Jun 2019 06:15), Max: 99.1 (12 Jun 2019 23:59)  HR: 87 (13 Jun 2019 06:15) (80 - 93)  BP: 164/91 (13 Jun 2019 06:15) (113/63 - 164/91)  BP(mean): --  RR: 18 (13 Jun 2019 06:15) (16 - 20)  SpO2: 100% (13 Jun 2019 06:15) (94% - 100%)    General:  A & O x3, NAD  Neck/Chest:  as above, catheter in situ  Lungs:  CTAB  Cardiovascular:  good S1, S2,   Abdomen:  Soft, non-tender, non-distended,  Extremities:  no calf swelling/tenderness b/l        LABS:                        8.0    8.26  )-----------( 558      ( 12 Jun 2019 07:57 )             24.7     06-12    147<H>  |  116<H>  |  36<H>  ----------------------------<  96  3.5   |  18<L>  |  8.29<H>    Ca    7.7<L>      12 Jun 2019 07:57  Mg     2.3     06-12    TPro  5.9<L>  /  Alb  0.7<L>  /  TBili  0.5  /  DBili  x   /  AST  13<L>  /  ALT  8<L>  /  AlkPhos  387<H>  06-12          RADIOLOGY & ADDITIONAL STUDIES:

## 2019-06-13 NOTE — PROGRESS NOTE ADULT - SUBJECTIVE AND OBJECTIVE BOX
Subjective: improving appetite      MEDICATIONS  (STANDING):  calcium acetate 1334 milliGRAM(s) Oral three times a day with meals  cefTRIAXone   IVPB 2 Gram(s) IV Intermittent every 24 hours  chlorhexidine 4% Liquid 1 Application(s) Topical <User Schedule>  heparin  Injectable 5000 Unit(s) SubCutaneous every 12 hours  pantoprazole    Tablet 40 milliGRAM(s) Oral every 12 hours  sodium bicarbonate 650 milliGRAM(s) Oral three times a day  sucralfate 1 Gram(s) Oral every 6 hours    MEDICATIONS  (PRN):  ondansetron Injectable 4 milliGRAM(s) IV Push every 6 hours PRN Nausea and/or Vomiting  oxyCODONE    5 mG/acetaminophen 325 mG 2 Tablet(s) Oral every 4 hours PRN Moderate Pain (4 - 6)  traMADol 50 milliGRAM(s) Oral every 12 hours PRN Moderate Pain (4 - 6)          T(C): 35.6 (06-13-19 @ 11:49), Max: 37.3 (06-12-19 @ 23:59)  HR: 88 (06-13-19 @ 11:49) (80 - 93)  BP: 169/95 (06-13-19 @ 11:49) (113/63 - 169/95)  RR: 16 (06-13-19 @ 11:49) (16 - 20)  SpO2: 100% (06-13-19 @ 11:49) (94% - 100%)  Wt(kg): --        I&O's Detail    12 Jun 2019 07:01  -  13 Jun 2019 07:00  --------------------------------------------------------  IN:    Oral Fluid: 240 mL    Solution: 50 mL  Total IN: 290 mL    OUT:    Bulb: 7.5 mL    Other: 1500 mL    Voided: 150 mL  Total OUT: 1657.5 mL    Total NET: -1367.5 mL      13 Jun 2019 07:01  -  13 Jun 2019 12:42  --------------------------------------------------------  IN:    Oral Fluid: 240 mL  Total IN: 240 mL    OUT:  Total OUT: 0 mL    Total NET: 240 mL               PHYSICAL EXAM:    GENERAL: comfortable.   EYES: EOMI, PERRLA, conjunctiva and sclera clear  NECK: Supple, no inc in JVP  CHEST/LUNG: Clear  HEART: S1S2  ABDOMEN: Soft, midline scar post recent Sx. RUQ drain.   EXTREMITIES:  trace edema  NEURO: no asterixis  Non-tunneled cath      LABS:  CBC Full  -  ( 12 Jun 2019 07:57 )  WBC Count : 8.26 K/uL  RBC Count : 2.62 M/uL  Hemoglobin : 8.0 g/dL  Hematocrit : 24.7 %  Platelet Count - Automated : 558 K/uL  Mean Cell Volume : 94.3 fl  Mean Cell Hemoglobin : 30.5 pg  Mean Cell Hemoglobin Concentration : 32.4 gm/dL  Auto Neutrophil # : x  Auto Lymphocyte # : x  Auto Monocyte # : x  Auto Eosinophil # : x  Auto Basophil # : x  Auto Neutrophil % : x  Auto Lymphocyte % : x  Auto Monocyte % : x  Auto Eosinophil % : x  Auto Basophil % : x    06-12    147<H>  |  116<H>  |  36<H>  ----------------------------<  96  3.5   |  18<L>  |  8.29<H>    Ca    7.7<L>      12 Jun 2019 07:57  Mg     2.3     06-12    TPro  5.9<L>  /  Alb  0.7<L>  /  TBili  0.5  /  DBili  x   /  AST  13<L>  /  ALT  8<L>  /  AlkPhos  387<H>  06-12          Impression:  * YUNIEL -- initially due to ATN. Now with a new episode of Cr rise. CT without hydro.  DDx: Gentamicin nephrotoxicity, septic ATN, PIGN. Initiated on HD  * Perforated viscus complicated by peritonitis, intra-abd collection  * S/p David patch repair  * MSSA bacteremia    Recommendations:   * HD today. UF 1.5kg  * D/c PO bicarb  * BMP in am

## 2019-06-13 NOTE — PROVIDER CONTACT NOTE (CRITICAL VALUE NOTIFICATION) - NAME OF MD/NP/PA/DO NOTIFIED:
JASON Zepeda
Duarte
JASON Zepeda
Katelyn GOMEZ
Katelyn GOMEZ
MD ortiz
ROB Dillard
Santosh Lawrence- PA
chuck
surgical PA
ynes Villavicencio
Anders RIVAS

## 2019-06-13 NOTE — PROGRESS NOTE ADULT - ASSESSMENT
57 yo male s/p non-tunneled dialysis catheter placement.  Pt with YUNIEL, HD initiated.  Catheter working optimally

## 2019-06-13 NOTE — CHART NOTE - NSCHARTNOTEFT_GEN_A_CORE
Assessment: Pt seen for acute severe malnutrition follow up. Pt admitted with perforated viscus s/p exploratory laparotomy, s/p gram patch repair, MSSA bacteremia, YUNIEL s/p HD cath placed 06-11; HD initiated 06-12. Hx of Hep C, ETOH abuse, ETOH related pancreatitis. Pt denied any N/V/D/C. Pt reported improved appetite s/p HD tx. Discussed with pt nutrition supplement while on HD to promote wound healing pt agreed.     Factors impacting intake: [ ] none [ ] nausea  [ ] vomiting [ ] diarrhea [ ] constipation  [ ]chewing problems [ ] swallowing issues  [ x ] other: lack of appetite     Diet Prescription: Diet, Renal Restrictions:   For patients receiving Renal Replacement - No Protein Restr, No Conc K, No Conc Phos, Low Sodium (06-11-19 @ 15:33)    Intake: <15% PO; breakfast 06/13- 50% PO intake     Current Weight: 83.4 kg ( 06/13) ; 74.3 (06/06)  % Weight Change: unable to assess dry weight changes. Discrepancy in fluid retention status noted      Physical appearance: BMI 24.1; generalized edema 1+ noted; Nutrition focused physical exam conducted:  Subcutaneous fat loss: [ moderate ] Orbital fat pads region, [ moderate ]Buccal fat region, [ severe ]Triceps region,  [ moderate ]Ribs region.  Muscle wasting: [ severe  ]Temples region, [ severe ]Clavicle region, [ mild ]Shoulder region, [ mild ]Scapula region, [ moderate ]Interosseous region,  [ severe ]thigh region, [ severe ]Calf region    Pertinent Medications: MEDICATIONS  (STANDING):  calcium acetate 1334 milliGRAM(s) Oral three times a day with meals  cefTRIAXone   IVPB 2 Gram(s) IV Intermittent every 24 hours  chlorhexidine 4% Liquid 1 Application(s) Topical <User Schedule>  heparin  Injectable 5000 Unit(s) SubCutaneous every 12 hours  pantoprazole    Tablet 40 milliGRAM(s) Oral every 12 hours  sodium bicarbonate 650 milliGRAM(s) Oral three times a day  sucralfate 1 Gram(s) Oral every 6 hours    MEDICATIONS  (PRN):  ondansetron Injectable 4 milliGRAM(s) IV Push every 6 hours PRN Nausea and/or Vomiting  oxyCODONE    5 mG/acetaminophen 325 mG 2 Tablet(s) Oral every 4 hours PRN Moderate Pain (4 - 6)  traMADol 50 milliGRAM(s) Oral every 12 hours PRN Moderate Pain (4 - 6)    Pertinent Labs: 06-12 Na 147 mmol/L<H> Glu 96 mg/dL K+ 3.5 mmol/L Cr 8.29 mg/dL<H> BUN 36 mg/dL<H> Phos n/a   Alb 0.7 g/dL<L> PAB n/a   Hgb 8.0 g/dL<L> Hct 24.7 %<L> HgA1C n/a     24Hr FS:102 mg/dL  87 mg/dL  97 mg/dL    Skin: stage II sacrum pressure injury     Estimated Needs:   [ x ] no change since previous assessment (05/17)  [ ] recalculated:     Previous Nutrition Diagnosis:   Malnutrition; severe malnutrition in context of acute illness   Related to: inadequate protein-energy intake in setting of gastric perforation, s/p exploratory laparotomy, yusef patch ;     (06/13)--> + increased protein needs with initiation of HD 06/12  As evidenced by: <50 protein-energy needs met x ~1month , <50% of meals x 26 days; + moderate-severe subq fat loss, and muscle waisting     Nutrition Diagnosis is [x  ] ongoing  [ ] resolved  [ x ] improved x1 day  [ ] not applicable     Previous goal: Pt to consume >75% of protein-energy needs via meals and supplements; maintain wt +/-2# (not met; unable to access wt with fluid retention change )        New Nutrition Diagnosis: [x ] not applicable     Interventions:   Recommend  [ x ] Continue: Current Diet Rx  [ ] Change Diet To:  [ x] Nutrition Supplement: Nepro x 2/day (provides 850 kcal, 38 g protein)   [ ] Nutrition Support:  [x ] Other: Provide encouragement     Monitoring and Evaluation:   [ x ] PO intake [ x ] Tolerance to diet prescription [ x ] weights [ x ] labs[ x ] follow up per protocol  [ ] other:

## 2019-06-13 NOTE — PROVIDER CONTACT NOTE (CRITICAL VALUE NOTIFICATION) - PERSON GIVING RESULT:
Elinor Wei
Aicha Solano
Demi Richard
Denise Pepe
JAZLYN Ohara
Lei
MARIA T,Gillessa
Marie Blackman
Miguel Calloway- SITA
Nisha
Gabriel Monroe
Eben COMER

## 2019-06-13 NOTE — PROGRESS NOTE ADULT - SUBJECTIVE AND OBJECTIVE BOX
58y old  Male who presents with a chief complaint of Abdominal pain, vomiting (28 May 2019 11:54)      Review of Systems:    General:  No wt loss, fevers, chills, night sweats  Eyes:  Good vision, no reported pain  ENT:  No sore throat, pain, runny nose, dysphagia  CV:  No pain, palpitations, hypo/hypertension  Resp:  No dyspnea, cough, tachypnea, wheezing           Social History/Family History  SOCHX:   tobacco,  -  alcohol    FMHX: FA/MO  - contributory       Discussed with:  PMD, Family    Physical Exam:    Vital Signs:  Vital Signs Last 24 Hrs  T(C): 37.4 (28 May 2019 11:34), Max: 37.4 (28 May 2019 11:34)  T(F): 99.3 (28 May 2019 11:34), Max: 99.3 (28 May 2019 11:34)  HR: 91 (28 May 2019 11:34) (78 - 98)  BP: 148/83 (28 May 2019 11:34) (132/73 - 171/70)  BP(mean): --  RR: 17 (28 May 2019 11:34) (16 - 17)  SpO2: 97% (28 May 2019 11:34) (95% - 97%)  Daily     Daily Weight in k.6 (28 May 2019 05:26)  I&O's Summary    27 May 2019 07:01  -  28 May 2019 07:00  --------------------------------------------------------  IN: 850 mL / OUT: 1112 mL / NET: -262 mL       conjunctivae clear, no thyromegaly, nodules, adenopathy, no JVD  Chest:  Full & symmetric excursion, no increased effort, breath sounds clear  Cardiovascular:  Regular rhythm, S1, S2, no murmur/rub/S3/S4, no carotid/femoral/abdominal bruit, radial/pedal pulses 2+,  edema  Abdomen:  Soft, non-tender, non-distended, normoactive bowel sounds, no HSM      Laboratory:                          7.3    22.75 )-----------( 645      ( 28 May 2019 06:57 )             22.3         150<H>  |  121<H>  |  18  ----------------------------<  83  3.5   |  20<L>  |  1.90<H>    Ca    7.6<L>      28 May 2019 06:57  Phos  3.7       Mg     1.5                 CAPILLARY BLOOD GLUCOSE      POCT Blood Glucose.: 82 mg/dL (28 May 2019 10:41)  POCT Blood Glucose.: 91 mg/dL (28 May 2019 07:19)  POCT Blood Glucose.: 73 mg/dL (27 May 2019 22:11)  POCT Blood Glucose.: 77 mg/dL (27 May 2019 15:41)            Assessment:  Bacteremia  Long history and hospital course noted and reviewed  ID noted  TTE noted  ISAIAS completed, No vegetations seen  ABX changed per ID, suspected cause renal worsening  CV remains at baseline

## 2019-06-13 NOTE — PROVIDER CONTACT NOTE (CRITICAL VALUE NOTIFICATION) - TEST AND RESULT REPORTED:
5/21/19 positive blood culture, growth in aerobic bottle gram  positive cocci in clusters.
Blood culture sent on 5/16/19 shows gram +ve cocci in clusters.
Blood culture sent on 5/16/19, another set of anerobic bottle grows gram +ve  cocci in clusters.
Potassium 2.9
WBC- 45.22
h/h 6.6/21.0
hep c reactive
hgb 5.5 hct 16.2
hgb 7.0
potassium 2.6
Body Fluid C&S positive for rare Staph Auries
k 2.7

## 2019-06-13 NOTE — PROGRESS NOTE ADULT - ASSESSMENT
57 yo M w/ history of gout, ETOH abuse, chronic pancreatitis, hep C p/w YUNIEL & sepsis POA from gastric perforation and purulent peritonitis & had ex-lap, yusef patch repair, peritoneal lavage on 5/16. His post op course was complicated by intraabdominal collection s/p IR perc drainage on 5/22 & MSSA bacteremia & YUNIEL.      YUNIEL  - initially due to ATN, now renal suspects Gentamicin nephrotoxicity, AIN, septic ATN  - renal following, started HD on 6/12    Post operative anemia on chronic anemia  - watch Hgb and transfuse if Hgb<7   - received 3 units on 5/16 & 1 unit on 6/2    Poor PO intake  - nutrition consult  - patient reports improvement today    Purulent peritonitis & gastric perforation w/ sepsis POA and persistent MSSA bacteremia  - leukocytosis improving  - retrogastric collection and/or lateral perihepatic/subcapsular collection drained by IR on 5/22 (250cc drained)  - OR & blood cultures grew MSSA, NGTD on repeat blood cx from 5/28  - nafcillin and flagyl witched to Ceftriaxone, due to worsening renal function  - TTE on 5/21 showed EF 60-65% w/ no evidence of valvular vegetation & ISAIAS on 6/4 was negative  - MRI of thoracolumbar spine was unremarkable & did not show diskitis /OM, Cervical spine MRI only showed chronic degenerative changes of C3-C6, with multilevel sac effacement   - CT showed a focal thickening at the hepatic flexure, GI will do colonoscopy tomorrow to evaluate for possible hepatic flexure mass  - CEA was 4.2  - colonoscopy on 6/30 had poor to fair prep and showed transverse colon extrinsic adhesions, GI recommended repeat in 1 year      Gastric perforation status post ex-lap, yusef patch repair, peritoneal lavage on 5/16  - status post extubation on 5/17; transferred out of ICU on 5/23  - UGIS showed no leak or extravasation on 5/23  - IR will check drain tomorrow  - care as per surgery  - Percoet for pain    Prophylaxis:  DVT: heparin   GI: Carafate and Protonix 59 yo M w/ history of gout, ETOH abuse, chronic pancreatitis, hep C p/w YUNIEL & sepsis POA from gastric perforation and purulent peritonitis & had ex-lap, yusef patch repair, peritoneal lavage on 5/16. His post op course was complicated by intraabdominal collection s/p IR perc drainage on 5/22 & MSSA bacteremia & YUNIEL.      YUNIEL  - initially due to ATN, now renal suspects Gentamicin nephrotoxicity, AIN, septic ATN  - renal following, started HD on 6/12    Post operative anemia on chronic anemia  - watch Hgb and transfuse if Hgb<7   - received 3 units on 5/16 & 1 unit on 6/2    Poor PO intake  - nutrition consult  - patient reports improvement today    Purulent peritonitis & gastric perforation w/ sepsis POA and persistent MSSA bacteremia  - leukocytosis improving  - retrogastric collection and/or lateral perihepatic/subcapsular collection drained by IR on 5/22 (250cc drained)  - OR & blood cultures grew MSSA, NGTD on repeat blood cx from 5/28  - nafcillin and flagyl witched to Ceftriaxone, due to worsening renal function  - TTE on 5/21 showed EF 60-65% w/ no evidence of valvular vegetation & ISAIAS on 6/4 was negative  - MRI of thoracolumbar spine was unremarkable & did not show diskitis /OM, Cervical spine MRI only showed chronic degenerative changes of C3-C6, with multilevel sac effacement   - CT showed a focal thickening at the hepatic flexure, colonoscopy on 6/30 had poor to fair prep and showed transverse colon extrinsic adhesions, GI recommended repeat in 1 year    - CEA was 4.2    Gastric perforation status post ex-lap, yusef patch repair, peritoneal lavage on 5/16  - status post extubation on 5/17; transferred out of ICU on 5/23  - UGIS showed no leak or extravasation on 5/23  - IR will check drain tomorrow  - care as per surgery  - Percoet for pain    Prophylaxis:  DVT: heparin   GI: Carafate and Protonix

## 2019-06-13 NOTE — PROGRESS NOTE ADULT - PROBLEM SELECTOR PLAN 1
sec to stomach perforation s/p yusef patch repair   initial OR c/s and blood c/+ MSSA  cont Rocephin(day 25 of 28 )    cr worsening ,will hold off on any more genta  follow wbc trend and temp curve

## 2019-06-13 NOTE — PROGRESS NOTE ADULT - SUBJECTIVE AND OBJECTIVE BOX
59 yo M w/ history of gout, ETOH abuse, chronic pancreatitis, hep C p/w YUNIEL & sepsis POA from gastric perforation and purulent peritonitis & had ex-lap, yusef patch repair, peritoneal lavage on 5/16. His post op course was complicated by intraabdominal collection s/p IR perc drainage on 5/22 & MSSA bacteremia & YUNIEL. He is lying in bed in NAD.     MEDICATIONS  (STANDING):  calcium acetate 1334 milliGRAM(s) Oral three times a day with meals  cefTRIAXone   IVPB 2 Gram(s) IV Intermittent every 24 hours  chlorhexidine 4% Liquid 1 Application(s) Topical <User Schedule>  heparin  Injectable 5000 Unit(s) SubCutaneous every 12 hours  pantoprazole    Tablet 40 milliGRAM(s) Oral every 12 hours  sodium bicarbonate 650 milliGRAM(s) Oral three times a day  sucralfate 1 Gram(s) Oral every 6 hours    MEDICATIONS  (PRN):  ondansetron Injectable 4 milliGRAM(s) IV Push every 6 hours PRN Nausea and/or Vomiting  traMADol 50 milliGRAM(s) Oral every 12 hours PRN Moderate Pain (4 - 6)      Allergies    No Known Allergies    Intolerances        Vital Signs Last 24 Hrs  T(C): 36.3 (12 Jun 2019 15:15), Max: 36.5 (12 Jun 2019 05:55)  T(F): 97.3 (12 Jun 2019 15:15), Max: 97.7 (12 Jun 2019 05:55)  HR: 87 (12 Jun 2019 15:15) (85 - 87)  BP: 145/87 (12 Jun 2019 15:15) (145/87 - 167/92)  BP(mean): --  RR: 16 (12 Jun 2019 15:15) (16 - 18)  SpO2: 98% (12 Jun 2019 15:15) (98% - 100%)    PHYSICAL EXAM:      MEDICATIONS  (STANDING):  calcium acetate 1334 milliGRAM(s) Oral three times a day with meals  cefTRIAXone   IVPB 2 Gram(s) IV Intermittent every 24 hours  chlorhexidine 4% Liquid 1 Application(s) Topical <User Schedule>  heparin  Injectable 5000 Unit(s) SubCutaneous every 12 hours  pantoprazole    Tablet 40 milliGRAM(s) Oral every 12 hours  sucralfate 1 Gram(s) Oral every 6 hours    MEDICATIONS  (PRN):  ondansetron Injectable 4 milliGRAM(s) IV Push every 6 hours PRN Nausea and/or Vomiting  oxyCODONE    5 mG/acetaminophen 325 mG 2 Tablet(s) Oral every 4 hours PRN Moderate Pain (4 - 6)  traMADol 50 milliGRAM(s) Oral every 12 hours PRN Moderate Pain (4 - 6)      Allergies    No Known Allergies    Intolerances        Vital Signs Last 24 Hrs  T(C): 36.3 (13 Jun 2019 14:30), Max: 37.3 (12 Jun 2019 23:59)  T(F): 97.3 (13 Jun 2019 14:30), Max: 99.1 (12 Jun 2019 23:59)  HR: 78 (13 Jun 2019 14:30) (78 - 90)  BP: 153/91 (13 Jun 2019 14:30) (124/63 - 169/95)  BP(mean): --  RR: 18 (13 Jun 2019 14:30) (16 - 18)  SpO2: 100% (13 Jun 2019 14:30) (96% - 100%)    PHYSICAL EXAM:  GENERAL: NAD, well-groomed, well-developed  HEAD:  Atraumatic, Normocephalic  EYES: EOMI, PERRLA   NECK: Supple   NERVOUS SYSTEM: Alert    CHEST/LUNG: Clear to auscultation bilaterally; No rales, rhonchi, wheezing, or rubs  HEART: Regular rate and rhythm; No murmurs, rubs, or gallops  ABDOMEN: Soft, Nontender, Nondistended; Bowel sounds present, well healing surgical scar  EXTREMITIES: No clubbing, cyanosis, or edema    LABS:                        8.0    8.26  )-----------( 558      ( 12 Jun 2019 07:57 )             24.7     06-12    147<H>  |  116<H>  |  36<H>  ----------------------------<  96  3.5   |  18<L>  |  8.29<H>    Ca    7.7<L>      12 Jun 2019 07:57  Mg     2.3     06-12    TPro  5.9<L>  /  Alb  0.7<L>  /  TBili  0.5  /  DBili  x   /  AST  13<L>  /  ALT  8<L>  /  AlkPhos  387<H>  06-12        CAPILLARY BLOOD GLUCOSE      POCT Blood Glucose.: 102 mg/dL (13 Jun 2019 10:42)  POCT Blood Glucose.: 87 mg/dL (13 Jun 2019 07:34)  POCT Blood Glucose.: 97 mg/dL (12 Jun 2019 23:53)      RADIOLOGY & ADDITIONAL TESTS:    06-12-19 @ 07:01  -  06-13-19 @ 07:00  --------------------------------------------------------  IN:    Oral Fluid: 240 mL    Solution: 50 mL  Total IN: 290 mL    OUT:    Bulb: 7.5 mL    Other: 1500 mL    Voided: 150 mL  Total OUT: 1657.5 mL    Total NET: -1367.5 mL      06-13-19 @ 07:01  -  06-13-19 @ 17:29  --------------------------------------------------------  IN:    Oral Fluid: 360 mL  Total IN: 360 mL    OUT:  Total OUT: 0 mL    Total NET: 360 mL

## 2019-06-13 NOTE — PROGRESS NOTE ADULT - SUBJECTIVE AND OBJECTIVE BOX
Patient is a 58y old  Male who presents with a chief complaint of Abdominal pain, vomiting (13 Jun 2019 12:41)      INTERVAL HPI / OVERNIGHT EVENTS: doing ok     MEDICATIONS  (STANDING):  calcium acetate 1334 milliGRAM(s) Oral three times a day with meals  cefTRIAXone   IVPB 2 Gram(s) IV Intermittent every 24 hours  chlorhexidine 4% Liquid 1 Application(s) Topical <User Schedule>  heparin  Injectable 5000 Unit(s) SubCutaneous every 12 hours  pantoprazole    Tablet 40 milliGRAM(s) Oral every 12 hours  sucralfate 1 Gram(s) Oral every 6 hours    MEDICATIONS  (PRN):  ondansetron Injectable 4 milliGRAM(s) IV Push every 6 hours PRN Nausea and/or Vomiting  oxyCODONE    5 mG/acetaminophen 325 mG 2 Tablet(s) Oral every 4 hours PRN Moderate Pain (4 - 6)  traMADol 50 milliGRAM(s) Oral every 12 hours PRN Moderate Pain (4 - 6)      Vital Signs Last 24 Hrs  T(C): 35.6 (13 Jun 2019 11:49), Max: 37.3 (12 Jun 2019 23:59)  T(F): 96 (13 Jun 2019 11:49), Max: 99.1 (12 Jun 2019 23:59)  HR: 88 (13 Jun 2019 11:49) (80 - 93)  BP: 169/95 (13 Jun 2019 11:49) (113/63 - 169/95)  BP(mean): --  RR: 16 (13 Jun 2019 11:49) (16 - 20)  SpO2: 100% (13 Jun 2019 11:49) (94% - 100%)    Review of systems:  General : no fever /chills,fatigue  CVS : no chest pain, palpitations  Lungs : no shortness of breath, cough  GI :+ abdominal pain, No vomiting, diarrhea   : no dysuria, hematuria        PHYSICAL EXAM:  General :NAD  Constitutional:  well-groomed, well-developed  Respiratory: CTAB/L  Cardiovascular: S1 and S2, RRR, no M/G/R  Gastrointestinal: BS+, soft, NT/ND  Extremities: No peripheral edema  Vascular: 2+ peripheral pulses  Skin: No rashes      LABS:                        8.0    8.26  )-----------( 558      ( 12 Jun 2019 07:57 )             24.7     06-12    147<H>  |  116<H>  |  36<H>  ----------------------------<  96  3.5   |  18<L>  |  8.29<H>    Ca    7.7<L>      12 Jun 2019 07:57  Mg     2.3     06-12    TPro  5.9<L>  /  Alb  0.7<L>  /  TBili  0.5  /  DBili  x   /  AST  13<L>  /  ALT  8<L>  /  AlkPhos  387<H>  06-12          MICROBIOLOGY:  RECENT CULTURES:        RADIOLOGY & ADDITIONAL STUDIES:

## 2019-06-14 LAB
ANA TITR SER: NEGATIVE — SIGNIFICANT CHANGE UP
ANION GAP SERPL CALC-SCNC: 9 MMOL/L — SIGNIFICANT CHANGE UP (ref 5–17)
BUN SERPL-MCNC: 20 MG/DL — SIGNIFICANT CHANGE UP (ref 7–23)
CALCIUM SERPL-MCNC: 7.3 MG/DL — LOW (ref 8.5–10.1)
CHLORIDE SERPL-SCNC: 111 MMOL/L — HIGH (ref 96–108)
CO2 SERPL-SCNC: 26 MMOL/L — SIGNIFICANT CHANGE UP (ref 22–31)
CREAT SERPL-MCNC: 5.05 MG/DL — HIGH (ref 0.5–1.3)
GLUCOSE SERPL-MCNC: 88 MG/DL — SIGNIFICANT CHANGE UP (ref 70–99)
HCT VFR BLD CALC: 24 % — LOW (ref 39–50)
HGB BLD-MCNC: 7.9 G/DL — LOW (ref 13–17)
MAGNESIUM SERPL-MCNC: 1.7 MG/DL — SIGNIFICANT CHANGE UP (ref 1.6–2.6)
MCHC RBC-ENTMCNC: 28.8 PG — SIGNIFICANT CHANGE UP (ref 27–34)
MCHC RBC-ENTMCNC: 32.9 GM/DL — SIGNIFICANT CHANGE UP (ref 32–36)
MCV RBC AUTO: 87.6 FL — SIGNIFICANT CHANGE UP (ref 80–100)
NRBC # BLD: 0 /100 WBCS — SIGNIFICANT CHANGE UP (ref 0–0)
NRBC # BLD: 0 /100 WBCS — SIGNIFICANT CHANGE UP (ref 0–0)
PHOSPHATE SERPL-MCNC: 4.3 MG/DL — SIGNIFICANT CHANGE UP (ref 2.5–4.5)
PLATELET # BLD AUTO: 370 K/UL — SIGNIFICANT CHANGE UP (ref 150–400)
POTASSIUM SERPL-MCNC: 3.3 MMOL/L — LOW (ref 3.5–5.3)
POTASSIUM SERPL-SCNC: 3.3 MMOL/L — LOW (ref 3.5–5.3)
RBC # BLD: 2.74 M/UL — LOW (ref 4.2–5.8)
RBC # FLD: 23.6 % — HIGH (ref 10.3–14.5)
SODIUM SERPL-SCNC: 146 MMOL/L — HIGH (ref 135–145)
WBC # BLD: 6.64 K/UL — SIGNIFICANT CHANGE UP (ref 3.8–10.5)
WBC # FLD AUTO: 6.64 K/UL — SIGNIFICANT CHANGE UP (ref 3.8–10.5)

## 2019-06-14 PROCEDURE — 99233 SBSQ HOSP IP/OBS HIGH 50: CPT

## 2019-06-14 RX ORDER — AMLODIPINE BESYLATE 2.5 MG/1
5 TABLET ORAL DAILY
Refills: 0 | Status: DISCONTINUED | OUTPATIENT
Start: 2019-06-14 | End: 2019-06-15

## 2019-06-14 RX ADMIN — HEPARIN SODIUM 5000 UNIT(S): 5000 INJECTION INTRAVENOUS; SUBCUTANEOUS at 05:08

## 2019-06-14 RX ADMIN — OXYCODONE AND ACETAMINOPHEN 2 TABLET(S): 5; 325 TABLET ORAL at 00:10

## 2019-06-14 RX ADMIN — OXYCODONE AND ACETAMINOPHEN 2 TABLET(S): 5; 325 TABLET ORAL at 06:00

## 2019-06-14 RX ADMIN — AMLODIPINE BESYLATE 5 MILLIGRAM(S): 2.5 TABLET ORAL at 17:21

## 2019-06-14 RX ADMIN — OXYCODONE AND ACETAMINOPHEN 2 TABLET(S): 5; 325 TABLET ORAL at 05:06

## 2019-06-14 RX ADMIN — OXYCODONE AND ACETAMINOPHEN 2 TABLET(S): 5; 325 TABLET ORAL at 18:30

## 2019-06-14 RX ADMIN — OXYCODONE AND ACETAMINOPHEN 2 TABLET(S): 5; 325 TABLET ORAL at 21:47

## 2019-06-14 RX ADMIN — OXYCODONE AND ACETAMINOPHEN 2 TABLET(S): 5; 325 TABLET ORAL at 17:31

## 2019-06-14 RX ADMIN — OXYCODONE AND ACETAMINOPHEN 2 TABLET(S): 5; 325 TABLET ORAL at 22:27

## 2019-06-14 RX ADMIN — PANTOPRAZOLE SODIUM 40 MILLIGRAM(S): 20 TABLET, DELAYED RELEASE ORAL at 19:51

## 2019-06-14 RX ADMIN — HEPARIN SODIUM 5000 UNIT(S): 5000 INJECTION INTRAVENOUS; SUBCUTANEOUS at 17:21

## 2019-06-14 RX ADMIN — CEFTRIAXONE 100 GRAM(S): 500 INJECTION, POWDER, FOR SOLUTION INTRAMUSCULAR; INTRAVENOUS at 17:21

## 2019-06-14 RX ADMIN — Medication 1334 MILLIGRAM(S): at 17:21

## 2019-06-14 NOTE — PROGRESS NOTE ADULT - SUBJECTIVE AND OBJECTIVE BOX
57 yo M w/ history of gout, ETOH abuse, chronic pancreatitis, hep C p/w YUNIEL & sepsis POA from gastric perforation and purulent peritonitis & had ex-lap, yusef patch repair, peritoneal lavage on 5/16. His post op course was complicated by intraabdominal collection s/p IR perc drainage on 5/22 & MSSA bacteremia & YUNIEL. He is lying in bed in NAD.     MEDICATIONS  (STANDING):  calcium acetate 1334 milliGRAM(s) Oral three times a day with meals  cefTRIAXone   IVPB 2 Gram(s) IV Intermittent every 24 hours  chlorhexidine 4% Liquid 1 Application(s) Topical <User Schedule>  heparin  Injectable 5000 Unit(s) SubCutaneous every 12 hours  pantoprazole    Tablet 40 milliGRAM(s) Oral every 12 hours  sucralfate 1 Gram(s) Oral every 6 hours    MEDICATIONS  (PRN):  ondansetron Injectable 4 milliGRAM(s) IV Push every 6 hours PRN Nausea and/or Vomiting  oxyCODONE    5 mG/acetaminophen 325 mG 2 Tablet(s) Oral every 4 hours PRN Moderate Pain (4 - 6)  traMADol 50 milliGRAM(s) Oral every 12 hours PRN Moderate Pain (4 - 6)      Allergies    No Known Allergies    Intolerances        Vital Signs Last 24 Hrs  T(C): 36.6 (14 Jun 2019 13:25), Max: 37 (13 Jun 2019 17:45)  T(F): 97.8 (14 Jun 2019 13:25), Max: 98.6 (13 Jun 2019 17:45)  HR: 88 (14 Jun 2019 14:11) (76 - 88)  BP: 158/90 (14 Jun 2019 14:11) (126/76 - 178/93)  BP(mean): --  RR: 19 (14 Jun 2019 14:11) (17 - 20)  SpO2: 97% (14 Jun 2019 14:11) (96% - 100%)    PHYSICAL EXAM:  GENERAL: NAD, well-groomed, well-developed  HEAD:  Atraumatic, Normocephalic  EYES: EOMI, PERRLA   NECK: Supple   NERVOUS SYSTEM: Alert    CHEST/LUNG: Clear to auscultation bilaterally; No rales, rhonchi, wheezing, or rubs  HEART: Regular rate and rhythm; No murmurs, rubs, or gallops  ABDOMEN: Soft, Nontender, Nondistended; Bowel sounds present, well healing surgical scar  EXTREMITIES: No clubbing, cyanosis, or edema    LABS:                        7.9    6.64  )-----------( 370      ( 14 Jun 2019 07:58 )             24.0     06-14    146<H>  |  111<H>  |  20  ----------------------------<  88  3.3<L>   |  26  |  5.05<H>    Ca    7.3<L>      14 Jun 2019 07:58  Phos  4.3     06-14  Mg     1.7     06-14          CAPILLARY BLOOD GLUCOSE      POCT Blood Glucose.: 98 mg/dL (13 Jun 2019 21:02)      RADIOLOGY & ADDITIONAL TESTS:    06-13-19 @ 07:01  -  06-14-19 @ 07:00  --------------------------------------------------------  IN:    Oral Fluid: 360 mL  Total IN: 360 mL    OUT:  Total OUT: 0 mL    Total NET: 360 mL      06-14-19 @ 07:01  -  06-14-19 @ 16:47  --------------------------------------------------------  IN:    Oral Fluid: 120 mL  Total IN: 120 mL    OUT:    Other: 2000 mL  Total OUT: 2000 mL    Total NET: -1880 mL

## 2019-06-14 NOTE — PROGRESS NOTE ADULT - ASSESSMENT
59 yo M w/ history of gout, ETOH abuse, chronic pancreatitis, hep C p/w YUNIEL & sepsis POA from gastric perforation and purulent peritonitis & had ex-lap, yusef patch repair, peritoneal lavage on 5/16. His post op course was complicated by intraabdominal collection s/p IR perc drainage on 5/22 & MSSA bacteremia & YUNIEL.      YUNIEL  - initially due to ATN, now renal suspects Gentamicin nephrotoxicity, AIN, septic ATN  - renal following, started HD on 6/12 - Dr. Dukes states he will reevaluate the patient for need for more HD & a perm cath on Monday    Post operative anemia on chronic anemia  - watch Hgb and transfuse if Hgb<7   - received 3 units on 5/16 & 1 unit on 6/2    HTN  - start norvasc    Poor PO intake  - nutrition consult  - patient reports improvement today    Purulent peritonitis & gastric perforation w/ sepsis POA and persistent MSSA bacteremia  - leukocytosis resolved  - retrogastric collection and/or lateral perihepatic/subcapsular collection drained by IR on 5/22 (250cc drained)  - OR & blood cultures grew MSSA, NGTD on repeat blood cx from 5/28  - nafcillin and flagyl witched to Ceftriaxone, due to worsening renal function  - TTE on 5/21 showed EF 60-65% w/ no evidence of valvular vegetation & ISAIAS on 6/4 was negative  - MRI of thoracolumbar spine was unremarkable & did not show diskitis /OM, Cervical spine MRI only showed chronic degenerative changes of C3-C6, with multilevel sac effacement   - CT showed a focal thickening at the hepatic flexure, colonoscopy on 6/30 had poor to fair prep and showed transverse colon extrinsic adhesions, GI recommended repeat in 1 year    - CEA was 4.2    Gastric perforation status post ex-lap, yusef patch repair, peritoneal lavage on 5/16  - status post extubation on 5/17; transferred out of ICU on 5/23  - UGIS showed no leak or extravasation on 5/23  - IR will check drain on Monday  - care as per surgery  - Percocet for pain    Prophylaxis:  DVT: heparin   GI: Carafate and Protonix

## 2019-06-14 NOTE — PROGRESS NOTE ADULT - SUBJECTIVE AND OBJECTIVE BOX
Patient is a 58y old  Male who presents with a chief complaint of Abdominal pain, vomiting (14 Jun 2019 12:18)      INTERVAL HPI / OVERNIGHT EVENTS:    MEDICATIONS  (STANDING):  amLODIPine   Tablet 5 milliGRAM(s) Oral daily  calcium acetate 1334 milliGRAM(s) Oral three times a day with meals  cefTRIAXone   IVPB 2 Gram(s) IV Intermittent every 24 hours  chlorhexidine 4% Liquid 1 Application(s) Topical <User Schedule>  heparin  Injectable 5000 Unit(s) SubCutaneous every 12 hours  pantoprazole    Tablet 40 milliGRAM(s) Oral every 12 hours  sucralfate 1 Gram(s) Oral every 6 hours    MEDICATIONS  (PRN):  ondansetron Injectable 4 milliGRAM(s) IV Push every 6 hours PRN Nausea and/or Vomiting  oxyCODONE    5 mG/acetaminophen 325 mG 2 Tablet(s) Oral every 4 hours PRN Moderate Pain (4 - 6)  traMADol 50 milliGRAM(s) Oral every 12 hours PRN Moderate Pain (4 - 6)      Vital Signs Last 24 Hrs  T(C): 36.6 (14 Jun 2019 13:25), Max: 37 (13 Jun 2019 17:45)  T(F): 97.8 (14 Jun 2019 13:25), Max: 98.6 (13 Jun 2019 17:45)  HR: 88 (14 Jun 2019 14:11) (76 - 88)  BP: 158/90 (14 Jun 2019 14:11) (126/76 - 178/93)  BP(mean): --  RR: 19 (14 Jun 2019 14:11) (17 - 20)  SpO2: 97% (14 Jun 2019 14:11) (96% - 100%)    Review of systems:  General : no fever /chills,fatigue  CVS : no chest pain, palpitations  Lungs : no shortness of breath, cough  GI : no abdominal pain,vomiting, diarrhea   : no dysuria,hematuria        PHYSICAL EXAM:  General :NAD  Constitutional:  well-groomed, well-developed  Respiratory: CTAB/L  Cardiovascular: S1 and S2, RRR, no M/G/R  Gastrointestinal: BS+, soft, NT/ND  Extremities: No peripheral edema  Vascular: 2+ peripheral pulses  Skin: No rashes      LABS:                        7.9    6.64  )-----------( 370      ( 14 Jun 2019 07:58 )             24.0     06-14    146<H>  |  111<H>  |  20  ----------------------------<  88  3.3<L>   |  26  |  5.05<H>    Ca    7.3<L>      14 Jun 2019 07:58  Phos  4.3     06-14  Mg     1.7     06-14            MICROBIOLOGY:  RECENT CULTURES:        RADIOLOGY & ADDITIONAL STUDIES: Patient is a 58y old  Male who presents with a chief complaint of Abdominal pain, vomiting (14 Jun 2019 12:18)      INTERVAL HPI / OVERNIGHT EVENTS: doing ok    MEDICATIONS  (STANDING):  amLODIPine   Tablet 5 milliGRAM(s) Oral daily  calcium acetate 1334 milliGRAM(s) Oral three times a day with meals  cefTRIAXone   IVPB 2 Gram(s) IV Intermittent every 24 hours  chlorhexidine 4% Liquid 1 Application(s) Topical <User Schedule>  heparin  Injectable 5000 Unit(s) SubCutaneous every 12 hours  pantoprazole    Tablet 40 milliGRAM(s) Oral every 12 hours  sucralfate 1 Gram(s) Oral every 6 hours    MEDICATIONS  (PRN):  ondansetron Injectable 4 milliGRAM(s) IV Push every 6 hours PRN Nausea and/or Vomiting  oxyCODONE    5 mG/acetaminophen 325 mG 2 Tablet(s) Oral every 4 hours PRN Moderate Pain (4 - 6)  traMADol 50 milliGRAM(s) Oral every 12 hours PRN Moderate Pain (4 - 6)      Vital Signs Last 24 Hrs  T(C): 36.6 (14 Jun 2019 13:25), Max: 37 (13 Jun 2019 17:45)  T(F): 97.8 (14 Jun 2019 13:25), Max: 98.6 (13 Jun 2019 17:45)  HR: 88 (14 Jun 2019 14:11) (76 - 88)  BP: 158/90 (14 Jun 2019 14:11) (126/76 - 178/93)  BP(mean): --  RR: 19 (14 Jun 2019 14:11) (17 - 20)  SpO2: 97% (14 Jun 2019 14:11) (96% - 100%)    Review of systems:  General : no fever /chills ,fatigue  CVS : no chest pain, palpitations  Lungs : no shortness of breath, cough  GI : + abdominal pain, No vomiting, diarrhea   : no dysuria,hematuria        PHYSICAL EXAM:  General :NAD  Constitutional:  well-groomed, well-developed  Respiratory: CTAB/L  Cardiovascular: S1 and S2, RRR, no M/G/R  Gastrointestinal: BS+, soft, NT/ND,drian +  Extremities: No peripheral edema  Vascular: 2+ peripheral pulses  Skin: No rashes      LABS:                        7.9    6.64  )-----------( 370      ( 14 Jun 2019 07:58 )             24.0     06-14    146<H>  |  111<H>  |  20  ----------------------------<  88  3.3<L>   |  26  |  5.05<H>    Ca    7.3<L>      14 Jun 2019 07:58  Phos  4.3     06-14  Mg     1.7     06-14            MICROBIOLOGY:  RECENT CULTURES:        RADIOLOGY & ADDITIONAL STUDIES:

## 2019-06-14 NOTE — PROGRESS NOTE ADULT - SUBJECTIVE AND OBJECTIVE BOX
58y old  Male who presents with a chief complaint of Abdominal pain, vomiting (28 May 2019 11:54)      Review of Systems:    General:  No wt loss, fevers, chills, night sweats  Eyes:  Good vision, no reported pain  ENT:  No sore throat, pain, runny nose, dysphagia  CV:  No pain, palpitations, hypo/hypertension  Resp:  No dyspnea, cough, tachypnea, wheezing           Social History/Family History  SOCHX:   tobacco,  -  alcohol    FMHX: FA/MO  - contributory       Discussed with:  PMD, Family    Physical Exam:    Vital Signs:  Vital Signs Last 24 Hrs  T(C): 37.4 (28 May 2019 11:34), Max: 37.4 (28 May 2019 11:34)  T(F): 99.3 (28 May 2019 11:34), Max: 99.3 (28 May 2019 11:34)  HR: 91 (28 May 2019 11:34) (78 - 98)  BP: 148/83 (28 May 2019 11:34) (132/73 - 171/70)  BP(mean): --  RR: 17 (28 May 2019 11:34) (16 - 17)  SpO2: 97% (28 May 2019 11:34) (95% - 97%)  Daily     Daily Weight in k.6 (28 May 2019 05:26)  I&O's Summary    27 May 2019 07:01  -  28 May 2019 07:00  --------------------------------------------------------  IN: 850 mL / OUT: 1112 mL / NET: -262 mL       conjunctivae clear, no thyromegaly, nodules, adenopathy, no JVD  Chest:  Full & symmetric excursion, no increased effort, breath sounds clear  Cardiovascular:  Regular rhythm, S1, S2, no murmur/rub/S3/S4, no carotid/femoral/abdominal bruit, radial/pedal pulses 2+,  edema  Abdomen:  Soft, non-tender, non-distended, normoactive bowel sounds, no HSM      Laboratory:                          7.3    22.75 )-----------( 645      ( 28 May 2019 06:57 )             22.3         150<H>  |  121<H>  |  18  ----------------------------<  83  3.5   |  20<L>  |  1.90<H>    Ca    7.6<L>      28 May 2019 06:57  Phos  3.7       Mg     1.5                 CAPILLARY BLOOD GLUCOSE      POCT Blood Glucose.: 82 mg/dL (28 May 2019 10:41)  POCT Blood Glucose.: 91 mg/dL (28 May 2019 07:19)  POCT Blood Glucose.: 73 mg/dL (27 May 2019 22:11)  POCT Blood Glucose.: 77 mg/dL (27 May 2019 15:41)            Assessment:  Bacteremia  Long history and hospital course noted and reviewed  ID noted  TTE noted  ISAIAS completed, No vegetations seen  ABX changed per ID, suspected cause renal worsening  CV remains at baseline  IR scheduled to check drain on Monday

## 2019-06-14 NOTE — PROGRESS NOTE ADULT - PROBLEM SELECTOR PLAN 1
sec to stomach perforation s/p yusef patch repair   initial OR c/s and blood c/+ MSSA  cont Rocephin(day 26 of 28 )    cr worsening ,will hold off on any more genta  follow wbc trend and temp curve sec to stomach perforation s/p yusef patch repair   initial OR c/s and blood c/+ MSSA  cont Rocephin as planned   cr worsening ,will hold off on any more genta  follow wbc trend and temp curve

## 2019-06-14 NOTE — PROGRESS NOTE ADULT - SUBJECTIVE AND OBJECTIVE BOX
Patient feels better, no distress;         MEDICATIONS  (STANDING):  calcium acetate 1334 milliGRAM(s) Oral three times a day with meals  cefTRIAXone   IVPB 2 Gram(s) IV Intermittent every 24 hours  chlorhexidine 4% Liquid 1 Application(s) Topical <User Schedule>  heparin  Injectable 5000 Unit(s) SubCutaneous every 12 hours  pantoprazole    Tablet 40 milliGRAM(s) Oral every 12 hours  sucralfate 1 Gram(s) Oral every 6 hours    MEDICATIONS  (PRN):  ondansetron Injectable 4 milliGRAM(s) IV Push every 6 hours PRN Nausea and/or Vomiting  oxyCODONE    5 mG/acetaminophen 325 mG 2 Tablet(s) Oral every 4 hours PRN Moderate Pain (4 - 6)  traMADol 50 milliGRAM(s) Oral every 12 hours PRN Moderate Pain (4 - 6)      06-13-19 @ 07:01  -  06-14-19 @ 07:00  --------------------------------------------------------  IN: 360 mL / OUT: 0 mL / NET: 360 mL    06-14-19 @ 07:01  -  06-14-19 @ 12:18  --------------------------------------------------------  IN: 120 mL / OUT: 0 mL / NET: 120 mL      PHYSICAL EXAM:      T(C): 36.7 (06-14-19 @ 10:00), Max: 37 (06-13-19 @ 17:45)  HR: 79 (06-14-19 @ 10:00) (76 - 88)  BP: 154/89 (06-14-19 @ 10:00) (126/76 - 178/93)  RR: 17 (06-14-19 @ 10:00) (17 - 20)  SpO2: 98% (06-14-19 @ 10:00) (96% - 100%)  Wt(kg): --  Respiratory: clear anteriorly, decreased BS at bases  Cardiovascular: S1 S2  Gastrointestinal: soft NT ND +BS  Extremities: 1-2  edema                                    7.9    6.64  )-----------( 370      ( 14 Jun 2019 07:58 )             24.0     06-14    146<H>  |  111<H>  |  20  ----------------------------<  88  3.3<L>   |  26  |  5.05<H>    Ca    7.3<L>      14 Jun 2019 07:58  Phos  4.3     06-14  Mg     1.7     06-14          Creatinine Trend: 5.05<--, 6.10<--, 8.29<--, 8.38<--, 7.57<--, 7.10<--    Assessment and Plan:    YUNIEL -- DDX infectious GN;  Gentamicin nephrotoxicity ATN; nafcillin AIN,  Low C3 suggestive of infectious GN, IC process; improving trend;   Third HD today, clinical status improving;   Plan to follow off HD Sat and Sunday, decide on resuming HD Monday;   In the event he requires continued treatment, would arrange perm cath placement Monday.  Arrange outpatient HD at [facility] [place].

## 2019-06-14 NOTE — CHART NOTE - NSCHARTNOTEFT_GEN_A_CORE
CT scan and tube check Monday to asses RUQ collection    As per nephrologies notes:  "Plan to follow off HD Sat and Sunday, decide on resuming HD Monday;   In the event he requires continued treatment, would arrange perm cath placement Monday."

## 2019-06-15 LAB
ANION GAP SERPL CALC-SCNC: 9 MMOL/L — SIGNIFICANT CHANGE UP (ref 5–17)
BUN SERPL-MCNC: 14 MG/DL — SIGNIFICANT CHANGE UP (ref 7–23)
CALCIUM SERPL-MCNC: 7.3 MG/DL — LOW (ref 8.5–10.1)
CHLORIDE SERPL-SCNC: 108 MMOL/L — SIGNIFICANT CHANGE UP (ref 96–108)
CO2 SERPL-SCNC: 28 MMOL/L — SIGNIFICANT CHANGE UP (ref 22–31)
CREAT SERPL-MCNC: 4.05 MG/DL — HIGH (ref 0.5–1.3)
GLUCOSE SERPL-MCNC: 92 MG/DL — SIGNIFICANT CHANGE UP (ref 70–99)
HCT VFR BLD CALC: 25.9 % — LOW (ref 39–50)
HGB BLD-MCNC: 8.1 G/DL — LOW (ref 13–17)
MAGNESIUM SERPL-MCNC: 1.8 MG/DL — SIGNIFICANT CHANGE UP (ref 1.6–2.6)
MCHC RBC-ENTMCNC: 28.2 PG — SIGNIFICANT CHANGE UP (ref 27–34)
MCHC RBC-ENTMCNC: 31.3 GM/DL — LOW (ref 32–36)
MCV RBC AUTO: 90.2 FL — SIGNIFICANT CHANGE UP (ref 80–100)
NRBC # BLD: 0 /100 WBCS — SIGNIFICANT CHANGE UP (ref 0–0)
PHOSPHATE SERPL-MCNC: 3.7 MG/DL — SIGNIFICANT CHANGE UP (ref 2.5–4.5)
PLATELET # BLD AUTO: 352 K/UL — SIGNIFICANT CHANGE UP (ref 150–400)
POTASSIUM SERPL-MCNC: 3.2 MMOL/L — LOW (ref 3.5–5.3)
POTASSIUM SERPL-SCNC: 3.2 MMOL/L — LOW (ref 3.5–5.3)
RBC # BLD: 2.87 M/UL — LOW (ref 4.2–5.8)
RBC # FLD: 24.2 % — HIGH (ref 10.3–14.5)
SODIUM SERPL-SCNC: 145 MMOL/L — SIGNIFICANT CHANGE UP (ref 135–145)
WBC # BLD: 7.6 K/UL — SIGNIFICANT CHANGE UP (ref 3.8–10.5)
WBC # FLD AUTO: 7.6 K/UL — SIGNIFICANT CHANGE UP (ref 3.8–10.5)

## 2019-06-15 PROCEDURE — 99233 SBSQ HOSP IP/OBS HIGH 50: CPT

## 2019-06-15 RX ORDER — AMLODIPINE BESYLATE 2.5 MG/1
10 TABLET ORAL DAILY
Refills: 0 | Status: DISCONTINUED | OUTPATIENT
Start: 2019-06-16 | End: 2019-06-21

## 2019-06-15 RX ORDER — POTASSIUM CHLORIDE 20 MEQ
10 PACKET (EA) ORAL ONCE
Refills: 0 | Status: DISCONTINUED | OUTPATIENT
Start: 2019-06-15 | End: 2019-06-15

## 2019-06-15 RX ORDER — POTASSIUM CHLORIDE 20 MEQ
10 PACKET (EA) ORAL ONCE
Refills: 0 | Status: COMPLETED | OUTPATIENT
Start: 2019-06-15 | End: 2019-06-15

## 2019-06-15 RX ORDER — MORPHINE SULFATE 50 MG/1
10 CAPSULE, EXTENDED RELEASE ORAL ONCE
Refills: 0 | Status: DISCONTINUED | OUTPATIENT
Start: 2019-06-15 | End: 2019-06-16

## 2019-06-15 RX ADMIN — Medication 1334 MILLIGRAM(S): at 08:08

## 2019-06-15 RX ADMIN — CHLORHEXIDINE GLUCONATE 1 APPLICATION(S): 213 SOLUTION TOPICAL at 08:08

## 2019-06-15 RX ADMIN — OXYCODONE AND ACETAMINOPHEN 2 TABLET(S): 5; 325 TABLET ORAL at 06:20

## 2019-06-15 RX ADMIN — OXYCODONE AND ACETAMINOPHEN 2 TABLET(S): 5; 325 TABLET ORAL at 23:19

## 2019-06-15 RX ADMIN — OXYCODONE AND ACETAMINOPHEN 2 TABLET(S): 5; 325 TABLET ORAL at 11:06

## 2019-06-15 RX ADMIN — OXYCODONE AND ACETAMINOPHEN 2 TABLET(S): 5; 325 TABLET ORAL at 17:34

## 2019-06-15 RX ADMIN — CEFTRIAXONE 100 GRAM(S): 500 INJECTION, POWDER, FOR SOLUTION INTRAMUSCULAR; INTRAVENOUS at 17:06

## 2019-06-15 RX ADMIN — OXYCODONE AND ACETAMINOPHEN 2 TABLET(S): 5; 325 TABLET ORAL at 22:19

## 2019-06-15 RX ADMIN — AMLODIPINE BESYLATE 5 MILLIGRAM(S): 2.5 TABLET ORAL at 05:42

## 2019-06-15 RX ADMIN — OXYCODONE AND ACETAMINOPHEN 2 TABLET(S): 5; 325 TABLET ORAL at 18:56

## 2019-06-15 RX ADMIN — PANTOPRAZOLE SODIUM 40 MILLIGRAM(S): 20 TABLET, DELAYED RELEASE ORAL at 17:05

## 2019-06-15 RX ADMIN — Medication 1334 MILLIGRAM(S): at 17:05

## 2019-06-15 RX ADMIN — OXYCODONE AND ACETAMINOPHEN 2 TABLET(S): 5; 325 TABLET ORAL at 12:16

## 2019-06-15 RX ADMIN — OXYCODONE AND ACETAMINOPHEN 2 TABLET(S): 5; 325 TABLET ORAL at 05:39

## 2019-06-15 RX ADMIN — HEPARIN SODIUM 5000 UNIT(S): 5000 INJECTION INTRAVENOUS; SUBCUTANEOUS at 17:05

## 2019-06-15 RX ADMIN — PANTOPRAZOLE SODIUM 40 MILLIGRAM(S): 20 TABLET, DELAYED RELEASE ORAL at 08:08

## 2019-06-15 RX ADMIN — HEPARIN SODIUM 5000 UNIT(S): 5000 INJECTION INTRAVENOUS; SUBCUTANEOUS at 05:42

## 2019-06-15 RX ADMIN — Medication 1334 MILLIGRAM(S): at 11:04

## 2019-06-15 NOTE — PROGRESS NOTE ADULT - SUBJECTIVE AND OBJECTIVE BOX
Patient feels well no new complaints today. Eating well.    MEDICATIONS  (STANDING):  amLODIPine   Tablet 5 milliGRAM(s) Oral daily  calcium acetate 1334 milliGRAM(s) Oral three times a day with meals  cefTRIAXone   IVPB 2 Gram(s) IV Intermittent every 24 hours  chlorhexidine 4% Liquid 1 Application(s) Topical <User Schedule>  heparin  Injectable 5000 Unit(s) SubCutaneous every 12 hours  pantoprazole    Tablet 40 milliGRAM(s) Oral every 12 hours  sucralfate 1 Gram(s) Oral every 6 hours    MEDICATIONS  (PRN):  ondansetron Injectable 4 milliGRAM(s) IV Push every 6 hours PRN Nausea and/or Vomiting  oxyCODONE    5 mG/acetaminophen 325 mG 2 Tablet(s) Oral every 4 hours PRN Moderate Pain (4 - 6)  traMADol 50 milliGRAM(s) Oral every 12 hours PRN Moderate Pain (4 - 6)      06-14-19 @ 07:01  -  06-15-19 @ 07:00  --------------------------------------------------------  IN: 120 mL / OUT: 2005 mL / NET: -1885 mL      PHYSICAL EXAM:      T(C): 36.6 (06-15-19 @ 13:08), Max: 36.6 (06-15-19 @ 13:08)  HR: 78 (06-15-19 @ 13:08) (78 - 101)  BP: 136/74 (06-15-19 @ 13:08) (133/81 - 162/98)  RR: 18 (06-15-19 @ 13:08) (16 - 18)  SpO2: 95% (06-15-19 @ 13:08) (95% - 100%)  Wt(kg): --  Respiratory: clear anteriorly, decreased BS at bases  Cardiovascular: S1 S2  Gastrointestinal: soft NT ND +BS  + drain  Extremities:   1 edema                                    8.1    7.60  )-----------( 352      ( 15 Cesar 2019 07:30 )             25.9     06-15    145  |  108  |  14  ----------------------------<  92  3.2<L>   |  28  |  4.05<H>    Ca    7.3<L>      15 Jun 2019 07:30  Phos  3.7     06-15  Mg     1.8     06-15          Creatinine Trend: 4.05<--, 5.05<--, 6.10<--, 8.29<--, 8.38<--, 7.57<--  Assessment and Plan:    YUNIEL -- DDX infectious GN;  Gentamicin nephrotoxicity ATN; nafcillin AIN,  Low C3 suggestive of infectious GN, IC process; improving trend;   Plan to follow off HD Sat and Sunday, decide on resuming HD Monday;   In the event he requires continued treatment, would arrange perm cath placement Monday.  Arrange outpatient HD at [facility].

## 2019-06-15 NOTE — PROGRESS NOTE ADULT - SUBJECTIVE AND OBJECTIVE BOX
59 yo M w/ history of gout, ETOH abuse, chronic pancreatitis, hep C p/w YUNIEL & sepsis POA from gastric perforation and purulent peritonitis & had ex-lap, yusef patch repair, peritoneal lavage on 5/16. His post op course was complicated by intraabdominal collection s/p IR perc drainage on 5/22 & MSSA bacteremia & YUNIEL. He is lying in bed in NAD.     MEDICATIONS  (STANDING):  amLODIPine   Tablet 5 milliGRAM(s) Oral daily  calcium acetate 1334 milliGRAM(s) Oral three times a day with meals  cefTRIAXone   IVPB 2 Gram(s) IV Intermittent every 24 hours  chlorhexidine 4% Liquid 1 Application(s) Topical <User Schedule>  heparin  Injectable 5000 Unit(s) SubCutaneous every 12 hours  pantoprazole    Tablet 40 milliGRAM(s) Oral every 12 hours  sucralfate 1 Gram(s) Oral every 6 hours    MEDICATIONS  (PRN):  ondansetron Injectable 4 milliGRAM(s) IV Push every 6 hours PRN Nausea and/or Vomiting  oxyCODONE    5 mG/acetaminophen 325 mG 2 Tablet(s) Oral every 4 hours PRN Moderate Pain (4 - 6)  traMADol 50 milliGRAM(s) Oral every 12 hours PRN Moderate Pain (4 - 6)      Allergies    No Known Allergies    Intolerances        Vital Signs Last 24 Hrs  T(C): 36.6 (15 Cesar 2019 17:03), Max: 36.6 (15 Cesar 2019 13:08)  T(F): 97.9 (15 Cesar 2019 17:03), Max: 97.9 (15 Cesar 2019 13:08)  HR: 84 (15 Cesar 2019 17:03) (78 - 101)  BP: 154/91 (15 Cesar 2019 17:03) (133/81 - 154/91)  BP(mean): --  RR: 17 (15 Cesar 2019 17:03) (16 - 18)  SpO2: 100% (15 Cesar 2019 17:03) (95% - 100%)    PHYSICAL EXAM:  GENERAL: NAD, well-groomed, well-developed  HEAD:  Atraumatic, Normocephalic  EYES: EOMI, PERRLA   NECK: Supple   NERVOUS SYSTEM: Alert    CHEST/LUNG: Clear to auscultation bilaterally; No rales, rhonchi, wheezing, or rubs  HEART: Regular rate and rhythm; No murmurs, rubs, or gallops  ABDOMEN: Soft, Nontender, Nondistended; Bowel sounds present, well healing surgical scar  EXTREMITIES: No clubbing, cyanosis, or edema      LABS:                        8.1    7.60  )-----------( 352      ( 15 Cesar 2019 07:30 )             25.9     06-15    145  |  108  |  14  ----------------------------<  92  3.2<L>   |  28  |  4.05<H>    Ca    7.3<L>      15 Cesar 2019 07:30  Phos  3.7     06-15  Mg     1.8     06-15          CAPILLARY BLOOD GLUCOSE      POCT Blood Glucose.: 84 mg/dL (15 Cesar 2019 15:38)  POCT Blood Glucose.: 113 mg/dL (15 Cesar 2019 10:44)  POCT Blood Glucose.: 118 mg/dL (15 Cesar 2019 07:43)  POCT Blood Glucose.: 122 mg/dL (14 Jun 2019 21:52)      RADIOLOGY & ADDITIONAL TESTS:    06-14-19 @ 07:01  -  06-15-19 @ 07:00  --------------------------------------------------------  IN:    Oral Fluid: 120 mL  Total IN: 120 mL    OUT:    Bulb: 5 mL    Other: 2000 mL  Total OUT: 2005 mL    Total NET: -1885 mL      06-15-19 @ 07:01  -  06-15-19 @ 18:44  --------------------------------------------------------  IN:    Solution: 50 mL  Total IN: 50 mL    OUT:    Bulb: 3 mL  Total OUT: 3 mL    Total NET: 47 mL

## 2019-06-15 NOTE — PROGRESS NOTE ADULT - ASSESSMENT
57 yo M w/ history of gout, ETOH abuse, chronic pancreatitis, hep C p/w YUNIEL & sepsis POA from gastric perforation and purulent peritonitis & had ex-lap, yusef patch repair, peritoneal lavage on 5/16. His post op course was complicated by intraabdominal collection s/p IR perc drainage on 5/22 & MSSA bacteremia & YUNIEL.      YUNIEL  - initially due to ATN, now renal suspects Gentamicin nephrotoxicity, AIN, septic ATN  - renal following, started HD on 6/12 - Dr. Dukes states he will keep patient off HD over the weekend & reevaluate for need for more HD & a perm cath on Monday    Post operative anemia on chronic anemia  - watch Hgb and transfuse if Hgb<7   - received 3 units on 5/16 & 1 unit on 6/2    HTN  - increase Norvasc    Hypokalemia  - give 10 mEq PO x 1    Poor PO intake  - nutrition consult  - patient reports improvement today    Purulent peritonitis & gastric perforation w/ sepsis POA and persistent MSSA bacteremia  - leukocytosis resolved  - retrogastric collection and/or lateral perihepatic/subcapsular collection drained by IR on 5/22 (250cc drained)  - OR & blood cultures grew MSSA, NGTD on repeat blood cx from 5/28  - nafcillin and flagyl witched to Ceftriaxone, due to worsening renal function  - TTE on 5/21 showed EF 60-65% w/ no evidence of valvular vegetation & ISAIAS on 6/4 was negative  - MRI of thoracolumbar spine was unremarkable & did not show diskitis /OM, Cervical spine MRI only showed chronic degenerative changes of C3-C6, with multilevel sac effacement   - CT showed a focal thickening at the hepatic flexure, colonoscopy on 6/30 had poor to fair prep and showed transverse colon extrinsic adhesions, GI recommended repeat in 1 year    - CEA was 4.2    Gastric perforation status post ex-lap, yusef patch repair, peritoneal lavage on 5/16  - status post extubation on 5/17; transferred out of ICU on 5/23  - UGIS showed no leak or extravasation on 5/23  - IR will check drain on Monday  - care as per surgery  - Percocet for pain    Prophylaxis:  DVT: heparin   GI: Carafate and Protonix

## 2019-06-16 LAB
ANION GAP SERPL CALC-SCNC: 6 MMOL/L — SIGNIFICANT CHANGE UP (ref 5–17)
BUN SERPL-MCNC: 18 MG/DL — SIGNIFICANT CHANGE UP (ref 7–23)
CALCIUM SERPL-MCNC: 7.4 MG/DL — LOW (ref 8.5–10.1)
CHLORIDE SERPL-SCNC: 110 MMOL/L — HIGH (ref 96–108)
CO2 SERPL-SCNC: 28 MMOL/L — SIGNIFICANT CHANGE UP (ref 22–31)
CREAT SERPL-MCNC: 4.4 MG/DL — HIGH (ref 0.5–1.3)
GLUCOSE SERPL-MCNC: 103 MG/DL — HIGH (ref 70–99)
HCT VFR BLD CALC: 24.1 % — LOW (ref 39–50)
HGB BLD-MCNC: 7.5 G/DL — LOW (ref 13–17)
MAGNESIUM SERPL-MCNC: 2 MG/DL — SIGNIFICANT CHANGE UP (ref 1.6–2.6)
MCHC RBC-ENTMCNC: 28.2 PG — SIGNIFICANT CHANGE UP (ref 27–34)
MCHC RBC-ENTMCNC: 31.1 GM/DL — LOW (ref 32–36)
MCV RBC AUTO: 90.6 FL — SIGNIFICANT CHANGE UP (ref 80–100)
NRBC # BLD: 0 /100 WBCS — SIGNIFICANT CHANGE UP (ref 0–0)
PLATELET # BLD AUTO: 292 K/UL — SIGNIFICANT CHANGE UP (ref 150–400)
POTASSIUM SERPL-MCNC: 3.7 MMOL/L — SIGNIFICANT CHANGE UP (ref 3.5–5.3)
POTASSIUM SERPL-SCNC: 3.7 MMOL/L — SIGNIFICANT CHANGE UP (ref 3.5–5.3)
RBC # BLD: 2.66 M/UL — LOW (ref 4.2–5.8)
RBC # FLD: 23.6 % — HIGH (ref 10.3–14.5)
SODIUM SERPL-SCNC: 144 MMOL/L — SIGNIFICANT CHANGE UP (ref 135–145)
WBC # BLD: 7.7 K/UL — SIGNIFICANT CHANGE UP (ref 3.8–10.5)
WBC # FLD AUTO: 7.7 K/UL — SIGNIFICANT CHANGE UP (ref 3.8–10.5)

## 2019-06-16 PROCEDURE — 99233 SBSQ HOSP IP/OBS HIGH 50: CPT

## 2019-06-16 RX ORDER — MORPHINE SULFATE 50 MG/1
10 CAPSULE, EXTENDED RELEASE ORAL ONCE
Refills: 0 | Status: DISCONTINUED | OUTPATIENT
Start: 2019-06-16 | End: 2019-06-16

## 2019-06-16 RX ADMIN — PANTOPRAZOLE SODIUM 40 MILLIGRAM(S): 20 TABLET, DELAYED RELEASE ORAL at 07:40

## 2019-06-16 RX ADMIN — OXYCODONE AND ACETAMINOPHEN 2 TABLET(S): 5; 325 TABLET ORAL at 08:59

## 2019-06-16 RX ADMIN — Medication 1334 MILLIGRAM(S): at 17:04

## 2019-06-16 RX ADMIN — HEPARIN SODIUM 5000 UNIT(S): 5000 INJECTION INTRAVENOUS; SUBCUTANEOUS at 17:05

## 2019-06-16 RX ADMIN — PANTOPRAZOLE SODIUM 40 MILLIGRAM(S): 20 TABLET, DELAYED RELEASE ORAL at 17:04

## 2019-06-16 RX ADMIN — Medication 1334 MILLIGRAM(S): at 07:39

## 2019-06-16 RX ADMIN — CEFTRIAXONE 100 GRAM(S): 500 INJECTION, POWDER, FOR SOLUTION INTRAMUSCULAR; INTRAVENOUS at 17:05

## 2019-06-16 RX ADMIN — AMLODIPINE BESYLATE 10 MILLIGRAM(S): 2.5 TABLET ORAL at 06:16

## 2019-06-16 RX ADMIN — Medication 1334 MILLIGRAM(S): at 11:03

## 2019-06-16 RX ADMIN — HEPARIN SODIUM 5000 UNIT(S): 5000 INJECTION INTRAVENOUS; SUBCUTANEOUS at 06:16

## 2019-06-16 RX ADMIN — MORPHINE SULFATE 10 MILLIGRAM(S): 50 CAPSULE, EXTENDED RELEASE ORAL at 17:07

## 2019-06-16 RX ADMIN — MORPHINE SULFATE 10 MILLIGRAM(S): 50 CAPSULE, EXTENDED RELEASE ORAL at 16:19

## 2019-06-16 RX ADMIN — CHLORHEXIDINE GLUCONATE 1 APPLICATION(S): 213 SOLUTION TOPICAL at 07:40

## 2019-06-16 RX ADMIN — OXYCODONE AND ACETAMINOPHEN 2 TABLET(S): 5; 325 TABLET ORAL at 07:44

## 2019-06-16 NOTE — PROGRESS NOTE ADULT - SUBJECTIVE AND OBJECTIVE BOX
59 yo M w/ history of gout, ETOH abuse, chronic pancreatitis, hep C p/w YUNIEL & sepsis POA from gastric perforation and purulent peritonitis & had ex-lap, yusef patch repair, peritoneal lavage on 5/16. His post op course was complicated by intraabdominal collection s/p IR perc drainage on 5/22 & MSSA bacteremia & YUNIEL. He is lying in bed in NAD.       MEDICATIONS  (STANDING):  amLODIPine   Tablet 10 milliGRAM(s) Oral daily  calcium acetate 1334 milliGRAM(s) Oral three times a day with meals  cefTRIAXone   IVPB 2 Gram(s) IV Intermittent every 24 hours  chlorhexidine 4% Liquid 1 Application(s) Topical <User Schedule>  heparin  Injectable 5000 Unit(s) SubCutaneous every 12 hours  pantoprazole    Tablet 40 milliGRAM(s) Oral every 12 hours  sucralfate 1 Gram(s) Oral every 6 hours    MEDICATIONS  (PRN):  ondansetron Injectable 4 milliGRAM(s) IV Push every 6 hours PRN Nausea and/or Vomiting  oxyCODONE    5 mG/acetaminophen 325 mG 2 Tablet(s) Oral every 4 hours PRN Moderate Pain (4 - 6)  traMADol 50 milliGRAM(s) Oral every 12 hours PRN Moderate Pain (4 - 6)      Allergies    No Known Allergies    Intolerances        Vital Signs Last 24 Hrs  T(C): 36.7 (16 Jun 2019 18:00), Max: 37.2 (16 Jun 2019 13:26)  T(F): 98 (16 Jun 2019 18:00), Max: 98.9 (16 Jun 2019 13:26)  HR: 96 (16 Jun 2019 18:00) (67 - 96)  BP: 150/89 (16 Jun 2019 18:00) (142/80 - 152/90)  BP(mean): --  RR: 19 (16 Jun 2019 18:00) (18 - 19)  SpO2: 100% (16 Jun 2019 18:00) (97% - 100%)    PHYSICAL EXAM:  GENERAL: NAD, well-groomed, well-developed  HEAD:  Atraumatic, Normocephalic  EYES: EOMI, PERRLA   NECK: Supple   NERVOUS SYSTEM: Alert    CHEST/LUNG: Clear to auscultation bilaterally; No rales, rhonchi, wheezing, or rubs  HEART: Regular rate and rhythm; No murmurs, rubs, or gallops  ABDOMEN: Soft, Nontender, Nondistended; Bowel sounds present, well healing surgical scar  EXTREMITIES: No clubbing, cyanosis, or edema    LABS:                        7.5    7.70  )-----------( 292      ( 16 Jun 2019 06:35 )             24.1     06-16    144  |  110<H>  |  18  ----------------------------<  103<H>  3.7   |  28  |  4.40<H>    Ca    7.4<L>      16 Jun 2019 06:35  Phos  3.7     06-15  Mg     2.0     06-16          CAPILLARY BLOOD GLUCOSE      POCT Blood Glucose.: 114 mg/dL (16 Jun 2019 15:39)  POCT Blood Glucose.: 106 mg/dL (16 Jun 2019 10:44)  POCT Blood Glucose.: 107 mg/dL (16 Jun 2019 07:15)  POCT Blood Glucose.: 86 mg/dL (15 Cesar 2019 22:10)      RADIOLOGY & ADDITIONAL TESTS:    06-15-19 @ 07:01  -  06-16-19 @ 07:00  --------------------------------------------------------  IN:    Solution: 50 mL  Total IN: 50 mL    OUT:    Bulb: 3 mL  Total OUT: 3 mL    Total NET: 47 mL

## 2019-06-16 NOTE — PROGRESS NOTE ADULT - ASSESSMENT
57 yo M w/ history of gout, ETOH abuse, chronic pancreatitis, hep C p/w YUNIEL & sepsis POA from gastric perforation and purulent peritonitis & had ex-lap, yusef patch repair, peritoneal lavage on 5/16. His post op course was complicated by intraabdominal collection s/p IR perc drainage on 5/22 & MSSA bacteremia & YUNIEL.      YUNIEL  - initially due to ATN, now renal suspects Gentamicin nephrotoxicity, AIN, septic ATN  - renal following, started HD on 6/12 - Dr. Dukes states he will keep patient off HD over the weekend & reevaluate for need for more HD & a perm cath on Monday    Post operative anemia on chronic anemia  - watch Hgb and transfuse if Hgb<7   - received 3 units on 5/16 & 1 unit on 6/2    HTN  - c/w Norvasc    Hypokalemia  - resolved    Poor PO intake  - nutrition consult  - patient reports improvement today    Purulent peritonitis & gastric perforation w/ sepsis POA and persistent MSSA bacteremia  - leukocytosis resolved  - retrogastric collection and/or lateral perihepatic/subcapsular collection drained by IR on 5/22 (250cc drained)  - OR & blood cultures grew MSSA, NGTD on repeat blood cx from 5/28  - nafcillin and flagyl witched to Ceftriaxone, due to worsening renal function  - TTE on 5/21 showed EF 60-65% w/ no evidence of valvular vegetation & ISAIAS on 6/4 was negative  - MRI of thoracolumbar spine was unremarkable & did not show diskitis /OM, Cervical spine MRI only showed chronic degenerative changes of C3-C6, with multilevel sac effacement   - CT showed a focal thickening at the hepatic flexure, colonoscopy on 6/30 had poor to fair prep and showed transverse colon extrinsic adhesions, GI recommended repeat in 1 year    - CEA was 4.2    Gastric perforation status post ex-lap, yusef patch repair, peritoneal lavage on 5/16  - status post extubation on 5/17; transferred out of ICU on 5/23  - UGIS showed no leak or extravasation on 5/23  - IR will check drain on Monday  - care as per surgery  - Percocet for pain    Prophylaxis:  DVT: heparin   GI: Carafate and Protonix

## 2019-06-16 NOTE — PROGRESS NOTE ADULT - SUBJECTIVE AND OBJECTIVE BOX
Patient feels well no complaints today.    MEDICATIONS  (STANDING):  amLODIPine   Tablet 10 milliGRAM(s) Oral daily  calcium acetate 1334 milliGRAM(s) Oral three times a day with meals  cefTRIAXone   IVPB 2 Gram(s) IV Intermittent every 24 hours  chlorhexidine 4% Liquid 1 Application(s) Topical <User Schedule>  heparin  Injectable 5000 Unit(s) SubCutaneous every 12 hours  pantoprazole    Tablet 40 milliGRAM(s) Oral every 12 hours  sucralfate 1 Gram(s) Oral every 6 hours    MEDICATIONS  (PRN):  morphine Concentrate 10 milliGRAM(s) Oral once PRN Severe Pain (7 - 10)  ondansetron Injectable 4 milliGRAM(s) IV Push every 6 hours PRN Nausea and/or Vomiting  oxyCODONE    5 mG/acetaminophen 325 mG 2 Tablet(s) Oral every 4 hours PRN Moderate Pain (4 - 6)  traMADol 50 milliGRAM(s) Oral every 12 hours PRN Moderate Pain (4 - 6)      06-15-19 @ 07:01  -  06-16-19 @ 07:00  --------------------------------------------------------  IN: 50 mL / OUT: 3 mL / NET: 47 mL      PHYSICAL EXAM:      T(C): 37.2 (06-16-19 @ 13:26), Max: 37.2 (06-16-19 @ 13:26)  HR: 67 (06-16-19 @ 13:26) (67 - 88)  BP: 143/78 (06-16-19 @ 13:26) (142/80 - 154/91)  RR: 18 (06-16-19 @ 13:26) (17 - 18)  SpO2: 97% (06-16-19 @ 13:26) (97% - 100%)  Wt(kg): --  Respiratory: clear anteriorly, decreased BS at bases  Cardiovascular: S1 S2  Gastrointestinal: soft NT ND +BS  Extremities:  1 edema                                    7.5    7.70  )-----------( 292      ( 16 Jun 2019 06:35 )             24.1     06-16    144  |  110<H>  |  18  ----------------------------<  103<H>  3.7   |  28  |  4.40<H>    Ca    7.4<L>      16 Jun 2019 06:35  Phos  3.7     06-15  Mg     2.0     06-16          Creatinine Trend: 4.40<--, 4.05<--, 5.05<--, 6.10<--, 8.29<--, 8.38<--  Assessment and Plan:    YUNIEL -- DDX infectious GN;  Gentamicin nephrotoxicity ATN; nafcillin AIN,  Low C3 suggestive of infectious GN, IC process; improving trend;   Plan to follow off HD Sat and Sunday, decide on resuming HD Monday;   In the event he requires continued treatment, would arrange perm cath placement Monday or Wednesday.

## 2019-06-17 LAB
ANION GAP SERPL CALC-SCNC: 8 MMOL/L — SIGNIFICANT CHANGE UP (ref 5–17)
BLD GP AB SCN SERPL QL: SIGNIFICANT CHANGE UP
BUN SERPL-MCNC: 21 MG/DL — SIGNIFICANT CHANGE UP (ref 7–23)
C3 SERPL-MCNC: 56 MG/DL — LOW (ref 81–157)
C4 SERPL-MCNC: 37 MG/DL — SIGNIFICANT CHANGE UP (ref 13–39)
CALCIUM SERPL-MCNC: 7.4 MG/DL — LOW (ref 8.5–10.1)
CHLORIDE SERPL-SCNC: 112 MMOL/L — HIGH (ref 96–108)
CO2 SERPL-SCNC: 27 MMOL/L — SIGNIFICANT CHANGE UP (ref 22–31)
CREAT SERPL-MCNC: 4.79 MG/DL — HIGH (ref 0.5–1.3)
CRYOFIB BLD-MCNC: SIGNIFICANT CHANGE UP
GLUCOSE BLDC GLUCOMTR-MCNC: 85 MG/DL — SIGNIFICANT CHANGE UP (ref 70–99)
GLUCOSE BLDC GLUCOMTR-MCNC: 90 MG/DL — SIGNIFICANT CHANGE UP (ref 70–99)
GLUCOSE SERPL-MCNC: 81 MG/DL — SIGNIFICANT CHANGE UP (ref 70–99)
HCT VFR BLD CALC: 23.2 % — LOW (ref 39–50)
HGB BLD-MCNC: 7.3 G/DL — LOW (ref 13–17)
MAGNESIUM SERPL-MCNC: 1.9 MG/DL — SIGNIFICANT CHANGE UP (ref 1.6–2.6)
MCHC RBC-ENTMCNC: 28.3 PG — SIGNIFICANT CHANGE UP (ref 27–34)
MCHC RBC-ENTMCNC: 31.5 GM/DL — LOW (ref 32–36)
MCV RBC AUTO: 89.9 FL — SIGNIFICANT CHANGE UP (ref 80–100)
NRBC # BLD: 0 /100 WBCS — SIGNIFICANT CHANGE UP (ref 0–0)
PHOSPHATE SERPL-MCNC: 3.7 MG/DL — SIGNIFICANT CHANGE UP (ref 2.5–4.5)
PLATELET # BLD AUTO: 260 K/UL — SIGNIFICANT CHANGE UP (ref 150–400)
POTASSIUM SERPL-MCNC: 3.4 MMOL/L — LOW (ref 3.5–5.3)
POTASSIUM SERPL-SCNC: 3.4 MMOL/L — LOW (ref 3.5–5.3)
RBC # BLD: 2.58 M/UL — LOW (ref 4.2–5.8)
RBC # FLD: 23.2 % — HIGH (ref 10.3–14.5)
SODIUM SERPL-SCNC: 147 MMOL/L — HIGH (ref 135–145)
WBC # BLD: 7.59 K/UL — SIGNIFICANT CHANGE UP (ref 3.8–10.5)
WBC # FLD AUTO: 7.59 K/UL — SIGNIFICANT CHANGE UP (ref 3.8–10.5)

## 2019-06-17 PROCEDURE — 77001 FLUOROGUIDE FOR VEIN DEVICE: CPT | Mod: 26,59

## 2019-06-17 PROCEDURE — 99233 SBSQ HOSP IP/OBS HIGH 50: CPT

## 2019-06-17 PROCEDURE — 74150 CT ABDOMEN W/O CONTRAST: CPT | Mod: 26

## 2019-06-17 PROCEDURE — 36558 INSERT TUNNELED CV CATH: CPT | Mod: RT

## 2019-06-17 PROCEDURE — 49424 ASSESS CYST CONTRAST INJECT: CPT

## 2019-06-17 PROCEDURE — 76080 X-RAY EXAM OF FISTULA: CPT | Mod: 26

## 2019-06-17 PROCEDURE — 93971 EXTREMITY STUDY: CPT | Mod: 26,RT

## 2019-06-17 RX ADMIN — PANTOPRAZOLE SODIUM 40 MILLIGRAM(S): 20 TABLET, DELAYED RELEASE ORAL at 21:35

## 2019-06-17 RX ADMIN — OXYCODONE AND ACETAMINOPHEN 2 TABLET(S): 5; 325 TABLET ORAL at 21:36

## 2019-06-17 RX ADMIN — HEPARIN SODIUM 5000 UNIT(S): 5000 INJECTION INTRAVENOUS; SUBCUTANEOUS at 06:32

## 2019-06-17 RX ADMIN — CHLORHEXIDINE GLUCONATE 1 APPLICATION(S): 213 SOLUTION TOPICAL at 08:43

## 2019-06-17 RX ADMIN — OXYCODONE AND ACETAMINOPHEN 2 TABLET(S): 5; 325 TABLET ORAL at 22:36

## 2019-06-17 RX ADMIN — AMLODIPINE BESYLATE 10 MILLIGRAM(S): 2.5 TABLET ORAL at 06:32

## 2019-06-17 RX ADMIN — OXYCODONE AND ACETAMINOPHEN 2 TABLET(S): 5; 325 TABLET ORAL at 10:46

## 2019-06-17 NOTE — PROGRESS NOTE ADULT - SUBJECTIVE AND OBJECTIVE BOX
Patient is a 58y old  Male who presents with a chief complaint of Abdominal pain, vomiting (17 Jun 2019 16:11)      INTERVAL HPI / OVERNIGHT EVENTS: doing ok     MEDICATIONS  (STANDING):  amLODIPine   Tablet 10 milliGRAM(s) Oral daily  calcium acetate 1334 milliGRAM(s) Oral three times a day with meals  chlorhexidine 4% Liquid 1 Application(s) Topical <User Schedule>  heparin  Injectable 5000 Unit(s) SubCutaneous every 12 hours  pantoprazole    Tablet 40 milliGRAM(s) Oral every 12 hours  sucralfate 1 Gram(s) Oral every 6 hours    MEDICATIONS  (PRN):  oxyCODONE    5 mG/acetaminophen 325 mG 2 Tablet(s) Oral every 4 hours PRN Moderate Pain (4 - 6)  traMADol 50 milliGRAM(s) Oral every 12 hours PRN Moderate Pain (4 - 6)      Vital Signs Last 24 Hrs  T(C): 36.8 (17 Jun 2019 11:23), Max: 37.1 (17 Jun 2019 05:19)  T(F): 98.2 (17 Jun 2019 11:23), Max: 98.7 (17 Jun 2019 05:19)  HR: 94 (17 Jun 2019 11:23) (93 - 96)  BP: 142/82 (17 Jun 2019 11:23) (142/82 - 153/87)  BP(mean): --  RR: 17 (17 Jun 2019 11:23) (17 - 19)  SpO2: 100% (17 Jun 2019 11:23) (98% - 100%)    Review of systems:  General : no fever /chills,fatigue  CVS : no chest pain, palpitations  Lungs : no shortness of breath, cough  GI : no abdominal pain,vomiting, diarrhea   : no dysuria,hematuria        PHYSICAL EXAM:  General :NAD  Constitutional:  well-groomed, well-developed  Respiratory: CTAB/L  Cardiovascular: S1 and S2, RRR, no M/G/R  Gastrointestinal: BS+, soft, NT/ND  Extremities: No peripheral edema  Vascular: 2+ peripheral pulses  Skin: No rashes      LABS:                        7.3    7.59  )-----------( 260      ( 17 Jun 2019 08:11 )             23.2     06-17    147<H>  |  112<H>  |  21  ----------------------------<  81  3.4<L>   |  27  |  4.79<H>    Ca    7.4<L>      17 Jun 2019 08:11  Phos  3.7     06-17  Mg     1.9     06-17            MICROBIOLOGY:  RECENT CULTURES:        RADIOLOGY & ADDITIONAL STUDIES:

## 2019-06-17 NOTE — PROGRESS NOTE ADULT - SUBJECTIVE AND OBJECTIVE BOX
58y old  Male who presents with a chief complaint of Abdominal pain, vomiting (28 May 2019 11:54)      Review of Systems:    General:  No wt loss, fevers, chills, night sweats  Eyes:  Good vision, no reported pain  ENT:  No sore throat, pain, runny nose, dysphagia  CV:  No pain, palpitations, hypo/hypertension  Resp:  No dyspnea, cough, tachypnea, wheezing           Social History/Family History  SOCHX:   tobacco,  -  alcohol    FMHX: FA/MO  - contributory       Discussed with:  PMD, Family    Physical Exam:    Vital Signs:  Vital Signs Last 24 Hrs  T(C): 37.4 (28 May 2019 11:34), Max: 37.4 (28 May 2019 11:34)  T(F): 99.3 (28 May 2019 11:34), Max: 99.3 (28 May 2019 11:34)  HR: 91 (28 May 2019 11:34) (78 - 98)  BP: 148/83 (28 May 2019 11:34) (132/73 - 171/70)  BP(mean): --  RR: 17 (28 May 2019 11:34) (16 - 17)  SpO2: 97% (28 May 2019 11:34) (95% - 97%)  Daily     Daily Weight in k.6 (28 May 2019 05:26)  I&O's Summary    27 May 2019 07:01  -  28 May 2019 07:00  --------------------------------------------------------  IN: 850 mL / OUT: 1112 mL / NET: -262 mL       conjunctivae clear, no thyromegaly, nodules, adenopathy, no JVD  Chest:  Full & symmetric excursion, no increased effort, breath sounds clear  Cardiovascular:  Regular rhythm, S1, S2, no murmur/rub/S3/S4, no carotid/femoral/abdominal bruit, radial/pedal pulses 2+,  edema  Abdomen:  Soft, non-tender, non-distended, normoactive bowel sounds, no HSM      Laboratory:                          7.3    22.75 )-----------( 645      ( 28 May 2019 06:57 )             22.3         150<H>  |  121<H>  |  18  ----------------------------<  83  3.5   |  20<L>  |  1.90<H>    Ca    7.6<L>      28 May 2019 06:57  Phos  3.7       Mg     1.5                 CAPILLARY BLOOD GLUCOSE      POCT Blood Glucose.: 82 mg/dL (28 May 2019 10:41)  POCT Blood Glucose.: 91 mg/dL (28 May 2019 07:19)  POCT Blood Glucose.: 73 mg/dL (27 May 2019 22:11)  POCT Blood Glucose.: 77 mg/dL (27 May 2019 15:41)            Assessment:  Bacteremia  Long history and hospital course noted and reviewed  ID noted  TTE noted  ISAIAS completed, No vegetations seen  ABX per ID,  CV remains at baseline

## 2019-06-17 NOTE — PROGRESS NOTE ADULT - ASSESSMENT
59 y/o male PMHx ETOH abuse, chronic pancreatitis, treated hepatitis C, gout c/o severe lower abdominal pain x 3 days. Admitted with perforated viscus. 57 y/o male PMHx ETOH abuse, chronic pancreatitis, treated hepatitis C, gout c/o severe lower abdominal pain x 3 days. Admitted with perforated viscus.     Plan: Tube check by IR went well, no fistula. CINDY drain in abdomen to be removed today.  Advance diet after perm cath placed.    Renal: CKD V. Agree with perm cath. Continue Phoslo TID. continue Protonix 40 mg bid and Carafate QID. Agree with PRBC at HD today, HGB borderline 7.3.     CV: Continue Norvasc 10 mg/day.      ID:  Spoke to ID. Remove PICC line in RUE. Completed course of Rocephin. RUE venous doppler ordered to r/o DVT.

## 2019-06-17 NOTE — PROGRESS NOTE ADULT - SUBJECTIVE AND OBJECTIVE BOX
Patient s/p conversion of temporary to tunneled catheter placement AND removal of RUQ CINDY drain    Operators: LOUIS Meier MD    Findings:  A 14.5fr x 19cm tip to cuff DL Bard dialysis catheter was tunneled than exchanged over a wire then the temporary catheter was removed in its entirety without incident.  Confirmed catheter tip placement at the cavoatrial junction with fluoro.  Hemostasis achieved.  Catheter sutured down.  DSD Applied.    Regarding RUQ CINDY drain.  CT shows the collection has resolved and fluoro tube check shows no fistulous communication.  Catheter removed    Complications: None.    Blood loss:  minimal        ASSESSMENT:  59 yo male s/p conversion of temporary to tunneled catheter placement AND removal of RUQ CINDY drain.  Pt has a h/o  YUNIEL, now Hd dependant, sepsis POA from gastric perforation and purulent peritonitis & had ex-lap, yusef patch repair, peritoneal lavage on 5/16. His post op course was complicated by intraabdominal collection     PLAN:  -Catheter can be accessed

## 2019-06-17 NOTE — PROGRESS NOTE ADULT - PROBLEM SELECTOR PLAN 1
sec to stomach perforation s/p yusef patch repair   initial OR c/s and blood c/+ MSSA  finish Rocephin today (day 28)  OK to remove midline  cr worsening ,will hold off on any more genta  follow wbc trend and temp curve sec to stomach perforation s/p yusef patch repair   initial OR c/s and blood c/+ MSSA  finish Rocephin today (day 28)  OK to remove midline

## 2019-06-17 NOTE — PROGRESS NOTE ADULT - PROBLEM SELECTOR PLAN 1
-Admit, emergent OR after resuscitation, NPO, IV hydration, IV abx  -Pain management. Discussed with Dr. Murray

## 2019-06-17 NOTE — CHART NOTE - NSCHARTNOTEFT_GEN_A_CORE
will convert temp to tunneled dialysis catheter today  ALSO  Will to CT scan and fluoro tube check to RUQ Catheter assessing collection    Meds, vitals and labs reviewed will convert temp to tunneled dialysis catheter today  ALSO   Abdominal CT scan and fluoro tube check of RUQ Catheter to assess collection    Meds, vitals and labs reviewed

## 2019-06-17 NOTE — PROGRESS NOTE ADULT - SUBJECTIVE AND OBJECTIVE BOX
INTERVAL HPI/OVERNIGHT EVENTS:  Pt seen and examined at bedside.     Allergies/Intolerance: No Known Allergies      MEDICATIONS  (STANDING):  amLODIPine   Tablet 10 milliGRAM(s) Oral daily  calcium acetate 1334 milliGRAM(s) Oral three times a day with meals  cefTRIAXone   IVPB 2 Gram(s) IV Intermittent every 24 hours  chlorhexidine 4% Liquid 1 Application(s) Topical <User Schedule>  heparin  Injectable 5000 Unit(s) SubCutaneous every 12 hours  pantoprazole    Tablet 40 milliGRAM(s) Oral every 12 hours  sucralfate 1 Gram(s) Oral every 6 hours    MEDICATIONS  (PRN):  ondansetron Injectable 4 milliGRAM(s) IV Push every 6 hours PRN Nausea and/or Vomiting  oxyCODONE    5 mG/acetaminophen 325 mG 2 Tablet(s) Oral every 4 hours PRN Moderate Pain (4 - 6)  traMADol 50 milliGRAM(s) Oral every 12 hours PRN Moderate Pain (4 - 6)        ROS: all systems reviewed and wnl      PHYSICAL EXAMINATION:  Vital Signs Last 24 Hrs  T(C): 36.8 (17 Jun 2019 11:23), Max: 37.1 (17 Jun 2019 05:19)  T(F): 98.2 (17 Jun 2019 11:23), Max: 98.7 (17 Jun 2019 05:19)  HR: 94 (17 Jun 2019 11:23) (93 - 96)  BP: 142/82 (17 Jun 2019 11:23) (142/82 - 153/87)  BP(mean): --  RR: 17 (17 Jun 2019 11:23) (17 - 19)  SpO2: 100% (17 Jun 2019 11:23) (98% - 100%)  CAPILLARY BLOOD GLUCOSE      POCT Blood Glucose.: 85 mg/dL (17 Jun 2019 10:45)  POCT Blood Glucose.: 89 mg/dL (17 Jun 2019 07:59)  POCT Blood Glucose.: 87 mg/dL (16 Jun 2019 22:30)  POCT Blood Glucose.: 114 mg/dL (16 Jun 2019 15:39)      06-16 @ 07:01  -  06-17 @ 07:00  --------------------------------------------------------  IN: 0 mL / OUT: 2 mL / NET: -2 mL    06-17 @ 07:01  -  06-17 @ 13:45  --------------------------------------------------------  IN: 130 mL / OUT: 0 mL / NET: 130 mL        GENERAL:   NECK: supple, No JVD  CHEST/LUNG: clear to auscultation bilaterally; no rales, rhonchi, or wheezing b/l  HEART: normal S1, S2  ABDOMEN: BS+, soft, ND, NT   EXTREMITIES:  pulses palpable; no clubbing, cyanosis, or edema b/l LEs  SKIN: no rashes or lesions      LABS:                        7.3    7.59  )-----------( 260      ( 17 Jun 2019 08:11 )             23.2     06-17    147<H>  |  112<H>  |  21  ----------------------------<  81  3.4<L>   |  27  |  4.79<H>    Ca    7.4<L>      17 Jun 2019 08:11  Phos  3.7     06-17  Mg     1.9     06-17 INTERVAL HPI/OVERNIGHT EVENTS:  Pt seen and examined at bedside.     Allergies/Intolerance: No Known Allergies      MEDICATIONS  (STANDING):  amLODIPine   Tablet 10 milliGRAM(s) Oral daily  calcium acetate 1334 milliGRAM(s) Oral three times a day with meals  cefTRIAXone   IVPB 2 Gram(s) IV Intermittent every 24 hours  chlorhexidine 4% Liquid 1 Application(s) Topical <User Schedule>  heparin  Injectable 5000 Unit(s) SubCutaneous every 12 hours  pantoprazole    Tablet 40 milliGRAM(s) Oral every 12 hours  sucralfate 1 Gram(s) Oral every 6 hours    MEDICATIONS  (PRN):  ondansetron Injectable 4 milliGRAM(s) IV Push every 6 hours PRN Nausea and/or Vomiting  oxyCODONE    5 mG/acetaminophen 325 mG 2 Tablet(s) Oral every 4 hours PRN Moderate Pain (4 - 6)  traMADol 50 milliGRAM(s) Oral every 12 hours PRN Moderate Pain (4 - 6)        ROS: all systems reviewed and wnl      PHYSICAL EXAMINATION:  Vital Signs Last 24 Hrs  T(C): 36.8 (17 Jun 2019 11:23), Max: 37.1 (17 Jun 2019 05:19)  T(F): 98.2 (17 Jun 2019 11:23), Max: 98.7 (17 Jun 2019 05:19)  HR: 94 (17 Jun 2019 11:23) (93 - 96)  BP: 142/82 (17 Jun 2019 11:23) (142/82 - 153/87)  BP(mean): --  RR: 17 (17 Jun 2019 11:23) (17 - 19)  SpO2: 100% (17 Jun 2019 11:23) (98% - 100%)  CAPILLARY BLOOD GLUCOSE      POCT Blood Glucose.: 85 mg/dL (17 Jun 2019 10:45)  POCT Blood Glucose.: 89 mg/dL (17 Jun 2019 07:59)  POCT Blood Glucose.: 87 mg/dL (16 Jun 2019 22:30)  POCT Blood Glucose.: 114 mg/dL (16 Jun 2019 15:39)      06-16 @ 07:01  -  06-17 @ 07:00  --------------------------------------------------------  IN: 0 mL / OUT: 2 mL / NET: -2 mL    06-17 @ 07:01  -  06-17 @ 13:45  --------------------------------------------------------  IN: 130 mL / OUT: 0 mL / NET: 130 mL        GENERAL: stable in bed, NAD, seen at IR office, waiting for perm cath placement.   NECK: supple, No JVD  CHEST/LUNG: clear to auscultation bilaterally; no rales, rhonchi, or wheezing b/l  HEART: normal S1, S2  ABDOMEN: BS+, soft, ND, NT   EXTREMITIES:  pulses palpable; no clubbing, cyanosis, or edema b/l LEs  SKIN: no rashes or lesions      LABS:                        7.3    7.59  )-----------( 260      ( 17 Jun 2019 08:11 )             23.2     06-17    147<H>  |  112<H>  |  21  ----------------------------<  81  3.4<L>   |  27  |  4.79<H>    Ca    7.4<L>      17 Jun 2019 08:11  Phos  3.7     06-17  Mg     1.9     06-17

## 2019-06-17 NOTE — PROGRESS NOTE ADULT - SUBJECTIVE AND OBJECTIVE BOX
Patient with no new complaints;   no distress;     MEDICATIONS  (STANDING):  amLODIPine   Tablet 10 milliGRAM(s) Oral daily  calcium acetate 1334 milliGRAM(s) Oral three times a day with meals  cefTRIAXone   IVPB 2 Gram(s) IV Intermittent every 24 hours  chlorhexidine 4% Liquid 1 Application(s) Topical <User Schedule>  heparin  Injectable 5000 Unit(s) SubCutaneous every 12 hours  pantoprazole    Tablet 40 milliGRAM(s) Oral every 12 hours  sucralfate 1 Gram(s) Oral every 6 hours    MEDICATIONS  (PRN):  ondansetron Injectable 4 milliGRAM(s) IV Push every 6 hours PRN Nausea and/or Vomiting  oxyCODONE    5 mG/acetaminophen 325 mG 2 Tablet(s) Oral every 4 hours PRN Moderate Pain (4 - 6)  traMADol 50 milliGRAM(s) Oral every 12 hours PRN Moderate Pain (4 - 6)      06-16-19 @ 07:01  -  06-17-19 @ 07:00  --------------------------------------------------------  IN: 0 mL / OUT: 2 mL / NET: -2 mL      PHYSICAL EXAM:      T(C): 37.1 (06-17-19 @ 05:19), Max: 37.2 (06-16-19 @ 13:26)  HR: 93 (06-17-19 @ 05:19) (67 - 96)  BP: 153/87 (06-17-19 @ 05:19) (143/78 - 153/87)  RR: 18 (06-17-19 @ 05:19) (18 - 19)  SpO2: 98% (06-17-19 @ 05:19) (97% - 100%)  Wt(kg): --  Respiratory: clear anteriorly, decreased BS at bases  Cardiovascular: S1 S2  Gastrointestinal: soft NT ND +BS  Extremities:   2 edema                                    7.3    7.59  )-----------( 260      ( 17 Jun 2019 08:11 )             23.2     06-17    147<H>  |  112<H>  |  21  ----------------------------<  81  3.4<L>   |  27  |  4.79<H>    Ca    7.4<L> corrected 9.6     17 Jun 2019 08:11  Phos  3.7     06-17  Mg     1.9     06-17    Alb 0.7       Creatinine Trend: 4.79<--, 4.40<--, 4.05<--, 5.05<--, 6.10<--, 8.29<--  Assessment and Plan:  YUNIEL CKD; suspected infectious GN vs Abx related injury ( AIN/ ATN);   Interdialytic renal indices not improving;   PRBC tx a t HD today   Will resume HD via perm cath in preparation for discharge.  He will require an outpatient HD position under my care in the community;   STR placement pending;

## 2019-06-18 ENCOUNTER — TRANSCRIPTION ENCOUNTER (OUTPATIENT)
Age: 59
End: 2019-06-18

## 2019-06-18 DIAGNOSIS — E55.9 VITAMIN D DEFICIENCY, UNSPECIFIED: ICD-10-CM

## 2019-06-18 DIAGNOSIS — R53.1 WEAKNESS: ICD-10-CM

## 2019-06-18 DIAGNOSIS — D50.0 IRON DEFICIENCY ANEMIA SECONDARY TO BLOOD LOSS (CHRONIC): ICD-10-CM

## 2019-06-18 DIAGNOSIS — N17.0 ACUTE KIDNEY FAILURE WITH TUBULAR NECROSIS: ICD-10-CM

## 2019-06-18 LAB
GLUCOSE BLDC GLUCOMTR-MCNC: 126 MG/DL — HIGH (ref 70–99)
GLUCOSE BLDC GLUCOMTR-MCNC: 79 MG/DL — SIGNIFICANT CHANGE UP (ref 70–99)
GLUCOSE BLDC GLUCOMTR-MCNC: 95 MG/DL — SIGNIFICANT CHANGE UP (ref 70–99)
GLUCOSE BLDC GLUCOMTR-MCNC: 98 MG/DL — SIGNIFICANT CHANGE UP (ref 70–99)
HCT VFR BLD CALC: 30.7 % — LOW (ref 39–50)
HGB BLD-MCNC: 9.8 G/DL — LOW (ref 13–17)
MCHC RBC-ENTMCNC: 28.1 PG — SIGNIFICANT CHANGE UP (ref 27–34)
MCHC RBC-ENTMCNC: 31.9 GM/DL — LOW (ref 32–36)
MCV RBC AUTO: 88 FL — SIGNIFICANT CHANGE UP (ref 80–100)
NRBC # BLD: 0 /100 WBCS — SIGNIFICANT CHANGE UP (ref 0–0)
PLATELET # BLD AUTO: 204 K/UL — SIGNIFICANT CHANGE UP (ref 150–400)
RBC # BLD: 3.49 M/UL — LOW (ref 4.2–5.8)
RBC # FLD: 21.5 % — HIGH (ref 10.3–14.5)
WBC # BLD: 6.62 K/UL — SIGNIFICANT CHANGE UP (ref 3.8–10.5)
WBC # FLD AUTO: 6.62 K/UL — SIGNIFICANT CHANGE UP (ref 3.8–10.5)

## 2019-06-18 PROCEDURE — 99233 SBSQ HOSP IP/OBS HIGH 50: CPT

## 2019-06-18 PROCEDURE — 99232 SBSQ HOSP IP/OBS MODERATE 35: CPT

## 2019-06-18 RX ORDER — FOLIC ACID 0.8 MG
1 TABLET ORAL DAILY
Refills: 0 | Status: DISCONTINUED | OUTPATIENT
Start: 2019-06-18 | End: 2019-06-21

## 2019-06-18 RX ORDER — THIAMINE MONONITRATE (VIT B1) 100 MG
1 TABLET ORAL
Qty: 0 | Refills: 0 | DISCHARGE
Start: 2019-06-18

## 2019-06-18 RX ORDER — THIAMINE MONONITRATE (VIT B1) 100 MG
100 TABLET ORAL DAILY
Refills: 0 | Status: DISCONTINUED | OUTPATIENT
Start: 2019-06-18 | End: 2019-06-21

## 2019-06-18 RX ORDER — SUCRALFATE 1 G
1 TABLET ORAL
Qty: 0 | Refills: 0 | DISCHARGE
Start: 2019-06-18

## 2019-06-18 RX ORDER — FOLIC ACID 0.8 MG
1 TABLET ORAL
Qty: 0 | Refills: 0 | DISCHARGE
Start: 2019-06-18

## 2019-06-18 RX ORDER — CHOLECALCIFEROL (VITAMIN D3) 125 MCG
1000 CAPSULE ORAL DAILY
Refills: 0 | Status: DISCONTINUED | OUTPATIENT
Start: 2019-06-18 | End: 2019-06-21

## 2019-06-18 RX ORDER — TRAMADOL HYDROCHLORIDE 50 MG/1
1 TABLET ORAL
Qty: 0 | Refills: 0 | DISCHARGE
Start: 2019-06-18

## 2019-06-18 RX ORDER — CALCIUM ACETATE 667 MG
0 TABLET ORAL
Qty: 0 | Refills: 0 | DISCHARGE
Start: 2019-06-18

## 2019-06-18 RX ORDER — PANTOPRAZOLE SODIUM 20 MG/1
1 TABLET, DELAYED RELEASE ORAL
Qty: 0 | Refills: 0 | DISCHARGE
Start: 2019-06-18

## 2019-06-18 RX ORDER — AMLODIPINE BESYLATE 2.5 MG/1
1 TABLET ORAL
Qty: 0 | Refills: 0 | DISCHARGE
Start: 2019-06-18

## 2019-06-18 RX ORDER — CHOLECALCIFEROL (VITAMIN D3) 125 MCG
1000 CAPSULE ORAL
Qty: 0 | Refills: 0 | DISCHARGE
Start: 2019-06-18

## 2019-06-18 RX ADMIN — HEPARIN SODIUM 5000 UNIT(S): 5000 INJECTION INTRAVENOUS; SUBCUTANEOUS at 05:51

## 2019-06-18 RX ADMIN — PANTOPRAZOLE SODIUM 40 MILLIGRAM(S): 20 TABLET, DELAYED RELEASE ORAL at 07:40

## 2019-06-18 RX ADMIN — OXYCODONE AND ACETAMINOPHEN 2 TABLET(S): 5; 325 TABLET ORAL at 07:46

## 2019-06-18 RX ADMIN — OXYCODONE AND ACETAMINOPHEN 2 TABLET(S): 5; 325 TABLET ORAL at 17:18

## 2019-06-18 RX ADMIN — AMLODIPINE BESYLATE 10 MILLIGRAM(S): 2.5 TABLET ORAL at 05:51

## 2019-06-18 RX ADMIN — Medication 1334 MILLIGRAM(S): at 12:00

## 2019-06-18 RX ADMIN — Medication 1334 MILLIGRAM(S): at 17:20

## 2019-06-18 RX ADMIN — Medication 1000 UNIT(S): at 17:22

## 2019-06-18 RX ADMIN — HEPARIN SODIUM 5000 UNIT(S): 5000 INJECTION INTRAVENOUS; SUBCUTANEOUS at 17:21

## 2019-06-18 RX ADMIN — Medication 1 MILLIGRAM(S): at 17:20

## 2019-06-18 RX ADMIN — Medication 100 MILLIGRAM(S): at 17:21

## 2019-06-18 RX ADMIN — Medication 1 TABLET(S): at 17:13

## 2019-06-18 RX ADMIN — OXYCODONE AND ACETAMINOPHEN 2 TABLET(S): 5; 325 TABLET ORAL at 18:10

## 2019-06-18 RX ADMIN — Medication 1334 MILLIGRAM(S): at 07:42

## 2019-06-18 RX ADMIN — CHLORHEXIDINE GLUCONATE 1 APPLICATION(S): 213 SOLUTION TOPICAL at 07:42

## 2019-06-18 RX ADMIN — OXYCODONE AND ACETAMINOPHEN 2 TABLET(S): 5; 325 TABLET ORAL at 08:35

## 2019-06-18 NOTE — DISCHARGE NOTE PROVIDER - HOSPITAL COURSE
57 y/o male PMHx ETOH abuse, chronic pancreatitis, treated hepatitis C, gout c/o severe lower abdominal pain x 3 days. Pain worsened today. Pain is associated with nausea and multiple episodes of clear vomiting. Patient took 2 aspirins which did not relieve the pain. Last BM was 3 days ago. Last flatus was today. 59 yo M w/ history of gout, ETOH abuse, chronic pancreatitis, hep C s/p treatment,p/w YUNIEL & sepsis POA from gastric perforation and purulent peritonitis & had ex-lap, yusef patch repair, peritoneal lavage on 5/16. His post op course was complicated by intraabdominal collection s/p IR perc drainage on 5/22 & MSSA bacteremia & YUNIEL.    Pt. was on surgical service than changed to medicine after major surgery issues resolved. Hospital course c/b YUNIEL, not fully recovering and renal feels he will need temporary HD in the community          Perforated viscus.  and sepsis POA    - retrogastric collection and/or lateral perihepatic/subcapsular collection drained by IR on 5/22 (250cc drained)    - OR & blood cultures grew MSSA, NGTD on repeat blood cx from 5/28    - nafcillin and flagyl witched to Ceftriaxone, due to worsening renal function    - TTE on 5/21 showed EF 60-65% w/ no evidence of valvular vegetation & ISAIAS on 6/4 was negative    - MRI of thoracolumbar spine was unremarkable & did not show diskitis /OM, Cervical spine MRI only showed chronic degenerative changes of C3-C6, with multilevel sac effacement     s/p removal of CINDY drain yesterday    no collection on CT scan.             ATN (acute tubular necrosis).  Plan: - initially due to ATN, now renal suspects Gentamicin nephrotoxicity, AIN, septic ATN    - renal following, started HD on 6/12, nephro feels will ultimately recover, but will need HD on a temporary basis, will not recover on this admission, so should go to rehab with HD and be followed closely by Dr. Dukes. s/p permacath in IR 6/17             Anemia due to blood loss.  Post operative anemia on chronic anemia    - watch Hgb and transfuse if Hgb<7     - received 3 units on 5/16 & 1 unit on 6/2    1 unit on 6/17 in HD, hgb responded appropriately.         R/O Colonic mass. - CT showed a focal thickening at the hepatic flexure, colonoscopy on 6/30 had poor to fair prep and showed transverse colon extrinsic adhesions, GI recommended repeat in 1 year      - CEA was 4.2.     ETOH : outpatient treatment if patient is willing.          Alcohol-induced chronic pancreatitis. Plan: not currently active, GI follow-up.    : Generalized weakness.  Plan: deconditioning, will need inpatient rehab as per PT.      Vitamin D deficiency.  Plan: with malnutrition, and presumed folate and thiamine deficiency will add supplements.

## 2019-06-18 NOTE — PROGRESS NOTE ADULT - PROBLEM SELECTOR PLAN 2
- initially due to ATN, now renal suspects Gentamicin nephrotoxicity, AIN, septic ATN  - renal following, started HD on 6/12, nephro feels will ultimately recover, but will need HD on a temporary basis, will not recover on this admission, so should go to rehab with HD and be followed closely by Dr. Dukes

## 2019-06-18 NOTE — PROGRESS NOTE ADULT - ASSESSMENT
57 yo M w/ history of gout, ETOH abuse, chronic pancreatitis, hep C s/p treatment,p/w YUNIEL & sepsis POA from gastric perforation and purulent peritonitis & had ex-lap, yusef patch repair, peritoneal lavage on 5/16. His post op course was complicated by intraabdominal collection s/p IR perc drainage on 5/22 & MSSA bacteremia & YUNIEL.  Pt. was on surgical service than changed to medicine after major surgery issues resolved. Hospital course c/b YUNIEL, not fully recovering and renal feels he will need temporary HD in the community.    Plan: Tube check by IR went well, no fistula. CINDY drain in abdomen to be removed today.  Advance diet after perm cath placed.    Renal: CKD V. Agree with perm cath. Continue Phoslo TID. continue Protonix 40 mg bid and Carafate QID. Agree with PRBC at HD today, HGB borderline 7.3.     CV: Continue Norvasc 10 mg/day.      ID:  Spoke to ID. Remove PICC line in RUE. Completed course of Rocephin. RUE venous doppler ordered to r/o DVT. 57 yo M w/ history of gout, ETOH abuse, chronic pancreatitis, hep C s/p treatment,p/w YUNIEL & sepsis POA from gastric perforation and purulent peritonitis & had ex-lap, yusef patch repair, peritoneal lavage on 5/16. His post op course was complicated by intraabdominal collection s/p IR perc drainage on 5/22 & MSSA bacteremia & YUNIEL.  Pt. was on surgical service than changed to medicine after major surgery issues resolved. Hospital course c/b YUNIEL, not fully recovering and renal feels he will need temporary HD in the community.

## 2019-06-18 NOTE — PROGRESS NOTE ADULT - SUBJECTIVE AND OBJECTIVE BOX
Patient feels well no complaints today.    MEDICATIONS  (STANDING):  amLODIPine   Tablet 10 milliGRAM(s) Oral daily  calcium acetate 1334 milliGRAM(s) Oral three times a day with meals  chlorhexidine 4% Liquid 1 Application(s) Topical <User Schedule>  cholecalciferol 1000 Unit(s) Oral daily  folic acid 1 milliGRAM(s) Oral daily  heparin  Injectable 5000 Unit(s) SubCutaneous every 12 hours  multivitamin 1 Tablet(s) Oral daily  pantoprazole    Tablet 40 milliGRAM(s) Oral every 12 hours  sucralfate 1 Gram(s) Oral every 6 hours  thiamine 100 milliGRAM(s) Oral daily    MEDICATIONS  (PRN):  oxyCODONE    5 mG/acetaminophen 325 mG 2 Tablet(s) Oral every 4 hours PRN Moderate Pain (4 - 6)  traMADol 50 milliGRAM(s) Oral every 12 hours PRN Moderate Pain (4 - 6)      06-17-19 @ 07:01  -  06-18-19 @ 07:00  --------------------------------------------------------  IN: 700 mL / OUT: 2230 mL / NET: -1530 mL    06-18-19 @ 07:01  -  06-18-19 @ 16:29  --------------------------------------------------------  IN: 340 mL / OUT: 0 mL / NET: 340 mL      PHYSICAL EXAM:      T(C): 36.7 (06-18-19 @ 11:40), Max: 37.3 (06-18-19 @ 05:38)  HR: 94 (06-18-19 @ 15:25) (80 - 94)  BP: 144/95 (06-18-19 @ 15:25) (118/72 - 155/92)  RR: 19 (06-18-19 @ 15:25) (16 - 20)  SpO2: 100% (06-18-19 @ 15:25) (95% - 100%)  Wt(kg): --  Respiratory: clear anteriorly, decreased BS at bases  Cardiovascular: S1 S2  Gastrointestinal: soft NT ND +BS  Extremities:  1 edema                                    9.8    6.62  )-----------( 204      ( 18 Jun 2019 13:21 )             30.7     06-17    147<H>  |  112<H>  |  21  ----------------------------<  81  3.4<L>   |  27  |  4.79<H>    Ca    7.4<L>      17 Jun 2019 08:11  Phos  3.7     06-17  Mg     1.9     06-17          Creatinine Trend: 4.79<--, 4.40<--, 4.05<--, 5.05<--, 6.10<--, 8.29<--  Assessment and Plan:    YUNIEL CKD; suspected infectious GN vs Abx related injury ( AIN/ ATN);   Perm cath placed yesterday, maintenance outpatient HD;   Will follow.

## 2019-06-18 NOTE — PROGRESS NOTE ADULT - PROBLEM SELECTOR PLAN 4
- CT showed a focal thickening at the hepatic flexure, colonoscopy on 6/30 had poor to fair prep and showed transverse colon extrinsic adhesions, GI recommended repeat in 1 year    - CEA was 4.2

## 2019-06-18 NOTE — PROGRESS NOTE ADULT - SUBJECTIVE AND OBJECTIVE BOX
Patient is a 58y old  Male who presents with a chief complaint of Abdominal pain, vomiting (18 Jun 2019 10:38)      INTERVAL HPI/OVERNIGHT EVENTS:  permacath placed in IR yesterday, RUQ CINDY removed  feels "better" meaning a little stronger, was able to use walker to get to bathroom and sit on toilet. States he is having soft bowel movements daily.    MEDICATIONS  (STANDING):  amLODIPine   Tablet 10 milliGRAM(s) Oral daily  calcium acetate 1334 milliGRAM(s) Oral three times a day with meals  chlorhexidine 4% Liquid 1 Application(s) Topical <User Schedule>  heparin  Injectable 5000 Unit(s) SubCutaneous every 12 hours  pantoprazole    Tablet 40 milliGRAM(s) Oral every 12 hours  sucralfate 1 Gram(s) Oral every 6 hours    MEDICATIONS  (PRN):  oxyCODONE    5 mG/acetaminophen 325 mG 2 Tablet(s) Oral every 4 hours PRN Moderate Pain (4 - 6)  traMADol 50 milliGRAM(s) Oral every 12 hours PRN Moderate Pain (4 - 6)      Allergies    No Known Allergies    Intolerances        REVIEW OF SYSTEMS:  CONSTITUTIONAL: No fever, +weight loss, + fatigue  EYES: No eye pain, visual disturbances, or discharge  ENMT:  No difficulty hearing, tinnitus, vertigo; No sinus or throat pain  NECK: No pain or stiffness  RESPIRATORY: No cough, wheezing, chills or hemoptysis; No shortness of breath  CARDIOVASCULAR: No chest pain, palpitations, dizziness, or leg swelling  GASTROINTESTINAL: No abdominal or epigastric pain. No nausea, vomiting, or hematemesis; + loose stools  GENITOURINARY: No dysuria, frequency, hematuria, or incontinence  NEUROLOGICAL: +loss of strength  SKIN: No itching, burning, rashes, or lesions   MUSCULOSKELETAL: No joint pain or swelling; No muscle, back, or extremity pain  PSYCHIATRIC: No depression, anxiety, mood swings, or difficulty sleeping  HEME/LYMPH: No easy bruising, or bleeding gums  ALLERGY AND IMMUNOLOGIC: No hives or eczema    Vital Signs Last 24 Hrs  T(C): 36.7 (18 Jun 2019 11:40), Max: 37.3 (18 Jun 2019 05:38)  T(F): 98.1 (18 Jun 2019 11:40), Max: 99.2 (18 Jun 2019 05:38)  HR: 82 (18 Jun 2019 11:40) (80 - 94)  BP: 141/83 (18 Jun 2019 11:40) (118/72 - 155/92)  BP(mean): --  RR: 17 (18 Jun 2019 11:40) (16 - 20)  SpO2: 100% (18 Jun 2019 11:40) (95% - 100%)    PHYSICAL EXAM:  GENERAL: NAD, thin  HEAD:  Atraumatic, Normocephalic  EYES: EOMI, PERRLA, conjunctiva and sclera clear  ENMT: No tonsillar erythema, exudates, or enlargement; Moist mucous membranes,  NECK: Supple, No JVD, Normal thyroid  NERVOUS SYSTEM:  Alert & Oriented X3, Good concentration; Motor Strength 4/5 B/L upper and lower extremities; DTRs 2+ intact and symmetric  CHEST/LUNG: Clear to percussion bilaterally; No rales, rhonchi, wheezing, or rubs  CW:  left sided permacath in place, some TTP, dressing with some serosanguinous drainage  HEART: Regular rate and rhythm; No murmurs, rubs, or gallops  ABDOMEN: Soft, Nontender, Nondistended; Bowel sounds present  EXTREMITIES:  2+ Peripheral Pulses, No clubbing, cyanosis, or edema  LYMPH: No lymphadenopathy noted  SKIN: No rashes or lesions    LABS:                        7.3    7.59  )-----------( 260      ( 17 Jun 2019 08:11 )             23.2     06-17    147<H>  |  112<H>  |  21  ----------------------------<  81  3.4<L>   |  27  |  4.79<H>    Ca    7.4<L>      17 Jun 2019 08:11  Phos  3.7     06-17  Mg     1.9     06-17          CAPILLARY BLOOD GLUCOSE      POCT Blood Glucose.: 95 mg/dL (18 Jun 2019 10:47)  POCT Blood Glucose.: 126 mg/dL (18 Jun 2019 07:33)  POCT Blood Glucose.: 90 mg/dL (17 Jun 2019 17:25)      RADIOLOGY & ADDITIONAL TESTS:    Imaging Personally Reviewed:  [ ] YES  [ ] NO    Consultant(s) Notes Reviewed:  [ ] YES  [ ] NO    Care Discussed with Consultants/Other Providers [ ] YES  [ ] NO Patient is a 58y old  Male who presents with a chief complaint of Abdominal pain, vomiting (18 Jun 2019 10:38)      INTERVAL HPI/OVERNIGHT EVENTS:  permacath placed in IR yesterday, RUQ CINDY removed  feels "better" meaning a little stronger, was able to use walker to get to bathroom and sit on toilet. States he is having soft bowel movements daily. tolerating diet    MEDICATIONS  (STANDING):  amLODIPine   Tablet 10 milliGRAM(s) Oral daily  calcium acetate 1334 milliGRAM(s) Oral three times a day with meals  chlorhexidine 4% Liquid 1 Application(s) Topical <User Schedule>  heparin  Injectable 5000 Unit(s) SubCutaneous every 12 hours  pantoprazole    Tablet 40 milliGRAM(s) Oral every 12 hours  sucralfate 1 Gram(s) Oral every 6 hours    MEDICATIONS  (PRN):  oxyCODONE    5 mG/acetaminophen 325 mG 2 Tablet(s) Oral every 4 hours PRN Moderate Pain (4 - 6)  traMADol 50 milliGRAM(s) Oral every 12 hours PRN Moderate Pain (4 - 6)      Allergies    No Known Allergies    Intolerances        REVIEW OF SYSTEMS:  CONSTITUTIONAL: No fever, +weight loss, + fatigue  EYES: No eye pain, visual disturbances, or discharge  ENMT:  No difficulty hearing, tinnitus, vertigo; No sinus or throat pain  NECK: No pain or stiffness  RESPIRATORY: No cough, wheezing, chills or hemoptysis; No shortness of breath  CARDIOVASCULAR: No chest pain, palpitations, dizziness, or leg swelling  GASTROINTESTINAL: No abdominal or epigastric pain. No nausea, vomiting, or hematemesis; + loose stools  GENITOURINARY: No dysuria, frequency, hematuria, or incontinence  NEUROLOGICAL: +loss of strength  SKIN: No itching, burning, rashes, or lesions   MUSCULOSKELETAL: No joint pain or swelling; No muscle, back, or extremity pain  PSYCHIATRIC: No depression, anxiety, mood swings, or difficulty sleeping  HEME/LYMPH: No easy bruising, or bleeding gums  ALLERGY AND IMMUNOLOGIC: No hives or eczema    Vital Signs Last 24 Hrs  T(C): 36.7 (18 Jun 2019 11:40), Max: 37.3 (18 Jun 2019 05:38)  T(F): 98.1 (18 Jun 2019 11:40), Max: 99.2 (18 Jun 2019 05:38)  HR: 82 (18 Jun 2019 11:40) (80 - 94)  BP: 141/83 (18 Jun 2019 11:40) (118/72 - 155/92)  BP(mean): --  RR: 17 (18 Jun 2019 11:40) (16 - 20)  SpO2: 100% (18 Jun 2019 11:40) (95% - 100%)    PHYSICAL EXAM:  GENERAL: NAD, thin  HEAD:  Atraumatic, Normocephalic  EYES: EOMI, PERRLA, conjunctiva and sclera clear  ENMT: No tonsillar erythema, exudates, or enlargement; Moist mucous membranes,  NECK: Supple, No JVD, Normal thyroid  NERVOUS SYSTEM:  Alert & Oriented X3, Good concentration; Motor Strength 4/5 B/L upper and lower extremities; DTRs 2+ intact and symmetric  CHEST/LUNG: Clear to percussion bilaterally; No rales, rhonchi, wheezing, or rubs  CW:  left sided permacath in place, some TTP, dressing with some serosanguinous drainage  HEART: Regular rate and rhythm; No murmurs, rubs, or gallops  ABDOMEN: Soft, Nontender, Nondistended; Bowel sounds present  EXTREMITIES:  2+ Peripheral Pulses, No clubbing, cyanosis, or edema  LYMPH: No lymphadenopathy noted  SKIN: No rashes or lesions    LABS:                        7.3    7.59  )-----------( 260      ( 17 Jun 2019 08:11 )             23.2     06-17    147<H>  |  112<H>  |  21  ----------------------------<  81  3.4<L>   |  27  |  4.79<H>    Ca    7.4<L>      17 Jun 2019 08:11  Phos  3.7     06-17  Mg     1.9     06-17          CAPILLARY BLOOD GLUCOSE      POCT Blood Glucose.: 95 mg/dL (18 Jun 2019 10:47)  POCT Blood Glucose.: 126 mg/dL (18 Jun 2019 07:33)  POCT Blood Glucose.: 90 mg/dL (17 Jun 2019 17:25)      RADIOLOGY & ADDITIONAL TESTS:    Imaging Personally Reviewed:  [x ] YES  [ ] NO    Consultant(s) Notes Reviewed:  [x ] YES  [ ] NO    Care Discussed with Consultants/Other Providers [x ] YES  [ ] NO

## 2019-06-18 NOTE — CHART NOTE - NSCHARTNOTEFT_GEN_A_CORE
Assessment:   Pt adm c perforated viscus, bandemia, abdominal pain, c  YUNIEL, CKD on HD, s/p perm cathter placement 06/17/19, poor oral intake c improvement, s/p exploratory laparotomy, yusef patch repair  PMH include gout, hepatitis C, pancreatitis, ETOH abuse.    Factors impacting intake: [ ] none [ ] nausea  [ ] vomiting [ ] diarrhea [ ] constipation  [ ]chewing problems [ ] swallowing issues  [ ] other:     Diet Prescription: Diet, Renal Restrictions:   For patients receiving Renal Replacement - No Protein Restr, No Conc K, No Conc Phos, Low Sodium  Supplement Feeding Modality:  Oral  Nepro Cans or Servings Per Day:  1       Frequency:  Two Times a day (06-13-19 @ 12:41)    Intake:     Current Weight: 06/18/19, 66.5 kg, 05/16/19, 62.7 kg, c wt. gain of 3.8 kg  % Weight Change: 6.0%  06/17, 2+ edema of legs noted     Pertinent Medications: MEDICATIONS  (STANDING):  amLODIPine   Tablet 10 milliGRAM(s) Oral daily  calcium acetate 1334 milliGRAM(s) Oral three times a day with meals  chlorhexidine 4% Liquid 1 Application(s) Topical <User Schedule>  heparin  Injectable 5000 Unit(s) SubCutaneous every 12 hours  pantoprazole    Tablet 40 milliGRAM(s) Oral every 12 hours  sucralfate 1 Gram(s) Oral every 6 hours    MEDICATIONS  (PRN):  oxyCODONE    5 mG/acetaminophen 325 mG 2 Tablet(s) Oral every 4 hours PRN Moderate Pain (4 - 6)  traMADol 50 milliGRAM(s) Oral every 12 hours PRN Moderate Pain (4 - 6)    Pertinent Labs: 06-17 Na147 mmol/L<H> Glu 81 mg/dL K+ 3.4 mmol/L<L> Cr  4.79 mg/dL<H> BUN 21 mg/dL 06-17 Phos 3.7 mg/dL 06-12 Alb 0.7 g/dL<L>     CAPILLARY BLOOD GLUCOSE      POCT Blood Glucose.: 126 mg/dL (18 Jun 2019 07:33)  POCT Blood Glucose.: 90 mg/dL (17 Jun 2019 17:25)  POCT Blood Glucose.: 85 mg/dL (17 Jun 2019 10:45)    Skin: pressure ulcer x 1  1.sacrum stage II    Estimated Needs:   [x ] no change since previous assessment(05/17/19)  [ ] recalculated:     Previous Nutrition Diagnosis:   Previous Nutrition Diagnosis:   Malnutrition; severe malnutrition in context of acute illness(05/28/19)     Related to: inadequate protein-energy intake in setting of gastric perforation, s/p exploratory laparotomy, yusef patch ;     (06/13)--> + increased protein needs with initiation of HD 06/12    As evidenced by: <50 protein-energy needs met x ~1month , <50% of meals x 26 days; + moderate-severe subq fat loss, and muscle waisting     Nutrition Diagnosis is [x  ] ongoing  [ ] resolved  [ x ] improved x1 day  [ ] not applicable     Previous goal:  Pt to consume >75% of protein-energy needs via meals and supplements;   maintain wt +/-2 LBS; unable to ascertain due to edema        Nutrition Diagnosis is [ ] ongoing  [ ] resolved [ ] not applicable     Goal:     New Nutrition Diagnosis: [ ] not applicable       Interventions:   Recommend  [ ] Change Diet To:  [ ] Nutrition Supplement  [ ] Nutrition Support  [ ] Other:     Monitoring and Evaluation:   [ ] PO intake [ x ] Tolerance to diet prescription [ x ] weights [ x ] labs[ x ] follow up per protocol  [ ] other: Assessment:   Pt adm c perforated viscus, bandemia, abdominal pain, c  YUNIEL, CKD on HD, s/p perm cathter placement 06/17/19, poor oral intake c improvement, s/p exploratory laparotomy, yusef patch repair  PMH include gout, hepatitis C, pancreatitis, ETOH abuse.    Factors impacting intake: [ ] none [ ] nausea  [ ] vomiting [ ] diarrhea [ ] constipation  [ ]chewing problems [ ] swallowing issues  [ ] other:     Diet Prescription: Diet, Renal Restrictions:   For patients receiving Renal Replacement - No Protein Restr, No Conc K, No Conc Phos, Low Sodium  Supplement Feeding Modality:  Oral  Nepro Cans or Servings Per Day:  1       Frequency:  Two Times a day (06-13-19 @ 12:41)    Intake:     Current Weight: 06/18/19, 66.5 kg, 05/16/19, 62.7 kg, c wt. gain of 3.8 kg  % Weight Change: 6.0%  06/17, 2+ edema of legs noted     Pertinent Medications: MEDICATIONS  (STANDING):  amLODIPine   Tablet 10 milliGRAM(s) Oral daily  calcium acetate 1334 milliGRAM(s) Oral three times a day with meals  chlorhexidine 4% Liquid 1 Application(s) Topical <User Schedule>  heparin  Injectable 5000 Unit(s) SubCutaneous every 12 hours  pantoprazole    Tablet 40 milliGRAM(s) Oral every 12 hours  sucralfate 1 Gram(s) Oral every 6 hours    MEDICATIONS  (PRN):  oxyCODONE    5 mG/acetaminophen 325 mG 2 Tablet(s) Oral every 4 hours PRN Moderate Pain (4 - 6)  traMADol 50 milliGRAM(s) Oral every 12 hours PRN Moderate Pain (4 - 6)    Pertinent Labs: 06-17 Na147 mmol/L<H> Glu 81 mg/dL K+ 3.4 mmol/L<L> Cr  4.79 mg/dL<H> BUN 21 mg/dL 06-17 Phos 3.7 mg/dL 06-12 Alb 0.7 g/dL<L>     CAPILLARY BLOOD GLUCOSE      POCT Blood Glucose.: 126 mg/dL (18 Jun 2019 07:33)  POCT Blood Glucose.: 90 mg/dL (17 Jun 2019 17:25)  POCT Blood Glucose.: 85 mg/dL (17 Jun 2019 10:45)    Skin: pressure ulcer x 1  1.sacrum stage II    Estimated Needs:   [x ] no change since previous assessment(05/17/19)  [ ] recalculated:     Previous Nutrition Diagnosis:   Previous Nutrition Diagnosis:   Malnutrition; severe malnutrition in context of acute illness(05/28/19)     Related to: inadequate protein-energy intake in setting of gastric perforation, s/p exploratory laparotomy, yusef patch ;     (06/13)--> + increased protein needs with initiation of HD 06/12    As evidenced by: <50 protein-energy needs met x ~1month , <50% of meals x 26 days; + moderate-severe subq fat loss, and muscle waisting     Nutrition Diagnosis is [x  ] ongoing  [ ] resolved  [ x ] improved x1 day  [ ] not applicable     Previous goal:  Pt to consume >75% of protein-energy needs via meals and supplements;   maintain wt +/-2 LBS; unable to ascertain due to edema        Nutrition Diagnosis is [ x] ongoing  [ ] resolved [ ] not applicable       New Nutrition Diagnosis: [ x] not applicable       Interventions:   Recommend  [ ] Change Diet To:  [ ] Nutrition Supplement  [ ] Nutrition Support  [ ] Other:     Monitoring and Evaluation:   [ ] PO intake [ x ] Tolerance to diet prescription [ x ] weights [ x ] labs[ x ] follow up per protocol  [ ] other: Assessment:   Pt adm c perforated viscus, bandemia, abdominal pain, c  YUNIEL, CKD on HD, s/p perm cathter placement 06/17/19, poor oral intake c improvement, s/p exploratory laparotomy, yusef patch repair  PMH include gout, hepatitis C, pancreatitis, ETOH abuse.  Pt c c/o not receiving foods as requested.  Pt provided c dialysis diet education, however pt appeared not ready to make diet/lifestyle changes.     Factors impacting intake: [x ] none [ ] nausea  [ ] vomiting [ ] diarrhea [ ] constipation  [ ]chewing problems [ ] swallowing issues  [ ] other:     Diet Prescription: Diet, Renal Restrictions:   For patients receiving Renal Replacement - No Protein Restr, No Conc K, No Conc Phos, Low Sodium  Supplement Feeding Modality:  Oral  Nepro Cans or Servings Per Day:  1       Frequency:  Two Times a day (06-13-19 @ 12:41)    Intake: >75%, dislikes Nepro, requesting vanilla Ensure    Current Weight: 06/18/19, 66.5 kg, 05/16/19, 62.7 kg, c wt. gain of 3.8 kg  % Weight Change: 6.0%  06/17, 2+ edema of legs noted   I/0: pt reports to void, 06/12/19, urine output 150 ml     Pertinent Medications: MEDICATIONS  (STANDING):  amLODIPine   Tablet 10 milliGRAM(s) Oral daily  calcium acetate 1334 milliGRAM(s) Oral three times a day with meals  chlorhexidine 4% Liquid 1 Application(s) Topical <User Schedule>  heparin  Injectable 5000 Unit(s) SubCutaneous every 12 hours  pantoprazole    Tablet 40 milliGRAM(s) Oral every 12 hours  sucralfate 1 Gram(s) Oral every 6 hours    MEDICATIONS  (PRN):  oxyCODONE    5 mG/acetaminophen 325 mG 2 Tablet(s) Oral every 4 hours PRN Moderate Pain (4 - 6)  traMADol 50 milliGRAM(s) Oral every 12 hours PRN Moderate Pain (4 - 6)    Pertinent Labs: 06-17 Na147 mmol/L<H> Glu 81 mg/dL K+ 3.4 mmol/L<L> Cr  4.79 mg/dL<H> BUN 21 mg/dL 06-17 Phos 3.7 mg/dL 06-12 Alb 0.7 g/dL<L>     CAPILLARY BLOOD GLUCOSE      POCT Blood Glucose.: 126 mg/dL (18 Jun 2019 07:33)  POCT Blood Glucose.: 90 mg/dL (17 Jun 2019 17:25)  POCT Blood Glucose.: 85 mg/dL (17 Jun 2019 10:45)    Skin: pressure ulcer x 1  1.sacrum stage II    Estimated Needs:   [x ] no change since previous assessment(05/17/19)  [ ] recalculated:     Previous Nutrition Diagnosis:   Malnutrition; severe malnutrition in context of acute illness(05/28/19)     Related to: inadequate protein-energy intake in setting of gastric perforation, s/p exploratory laparotomy, yusef patch ;     (06/13)--> + increased protein needs with initiation of HD 06/12    As evidenced by: <50 protein-energy needs met x ~1month , <50% of meals x 26 days; + moderate-severe subq fat loss, and muscle waisting     Nutrition Diagnosis is [x  ] ongoing  [ ] resolved  [ x ] improved x1 day  [ ] not applicable     Previous goal:  Pt to consume >75% of protein-energy needs via meals; met >75% of supplements; not met   maintain wt +/-2 LBS; unable to ascertain due to edema    Nutrition Diagnosis is [ x] ongoing  [ ] resolved [ ] not applicable       New Nutrition Diagnosis: [ x] not applicable       Interventions:   Recommend  [x ] Change Diet To:  Low sodium, no concentrated phosphorus (no potassium restriction @ present due to low potassium levels),1200 ml fluid restriction, Ensure Enlive 1 x /day=375 calories, 20 grams protein   [ ] Nutrition Supplement  [ ] Nutrition Support  [x ] Other: Nephro-Candy daily     Monitoring and Evaluation:   [ x] PO intake [ x ] Tolerance to diet prescription [ x ] weights [ x ] labs, potassium/renal indices [ x ] follow up per protocol  [x ] other: I/O Assessment:   Pt adm c perforated viscus, bandemia, abdominal pain, c  YUNIEL, CKD on HD, s/p perm cathter placement 06/17/19, poor oral intake c improvement, s/p exploratory laparotomy, yusef patch repair  PMH include gout, hepatitis C, pancreatitis, ETOH abuse.  Pt c c/o not receiving foods as requested.  Pt provided c dialysis diet education, however pt appeared not ready to make diet/lifestyle changes.     Factors impacting intake: [x ] none [ ] nausea  [ ] vomiting [ ] diarrhea [ ] constipation  [ ]chewing problems [ ] swallowing issues  [ ] other:     Diet Prescription: Diet, Renal Restrictions:   For patients receiving Renal Replacement - No Protein Restr, No Conc K, No Conc Phos, Low Sodium  Supplement Feeding Modality:  Oral  Nepro Cans or Servings Per Day:  1       Frequency:  Two Times a day (06-13-19 @ 12:41)    Intake: >75%, dislikes Nepro, requesting vanilla Ensure    Current Weight: 06/18/19, 66.5 kg, 05/16/19, 62.7 kg, c wt. gain of 3.8 kg  % Weight Change: 6.0%  06/17, 2+ edema of legs noted   I/0: pt reports to void, 06/12/19, urine output 150 ml     Pertinent Medications: MEDICATIONS  (STANDING):  amLODIPine   Tablet 10 milliGRAM(s) Oral daily  calcium acetate 1334 milliGRAM(s) Oral three times a day with meals  chlorhexidine 4% Liquid 1 Application(s) Topical <User Schedule>  heparin  Injectable 5000 Unit(s) SubCutaneous every 12 hours  pantoprazole    Tablet 40 milliGRAM(s) Oral every 12 hours  sucralfate 1 Gram(s) Oral every 6 hours    MEDICATIONS  (PRN):  oxyCODONE    5 mG/acetaminophen 325 mG 2 Tablet(s) Oral every 4 hours PRN Moderate Pain (4 - 6)  traMADol 50 milliGRAM(s) Oral every 12 hours PRN Moderate Pain (4 - 6)    Pertinent Labs: 06-17 Na147 mmol/L<H> Glu 81 mg/dL K+ 3.4 mmol/L<L> Cr  4.79 mg/dL<H> BUN 21 mg/dL 06-17 Phos 3.7 mg/dL 06-12 Alb 0.7 g/dL<L>     CAPILLARY BLOOD GLUCOSE      POCT Blood Glucose.: 126 mg/dL (18 Jun 2019 07:33)  POCT Blood Glucose.: 90 mg/dL (17 Jun 2019 17:25)  POCT Blood Glucose.: 85 mg/dL (17 Jun 2019 10:45)    Skin: pressure ulcer x 1  1.sacrum stage II    Estimated Needs:   [x ] no change since previous assessment(05/17/19)  [ ] recalculated:     Previous Nutrition Diagnosis:   Malnutrition; severe malnutrition in context of acute illness(05/28/19)     Related to: inadequate protein-energy intake in setting of gastric perforation, s/p exploratory laparotomy, yusef patch ;     (06/13)--> + increased protein needs with initiation of HD 06/12    As evidenced by: <50 protein-energy needs met x ~1month , <50% of meals x 26 days; + moderate-severe subq fat loss, and muscle waisting     Nutrition Diagnosis is [x  ] ongoing  [ ] resolved  [ x ] improved x1 day  [ ] not applicable     Previous goal:  Pt to consume >75% of protein-energy needs via meals; met >75% of supplements; not met   maintain wt +/-2 LBS; unable to ascertain due to edema    Nutrition Diagnosis is [ x] ongoing  [ ] resolved [ ] not applicable       New Nutrition Diagnosis: [ x] not applicable       Interventions:   Recommend  [x ] Change Diet To:  Low sodium, (no potassium restriction @ present due to low potassium levels)Ensure Enlive 1 x /day=375 calories, 20 grams protein   [ ] Nutrition Supplement  [ ] Nutrition Support  [x ] Other: Nephro-Candy daily     Monitoring and Evaluation:   [ x] PO intake [ x ] Tolerance to diet prescription [ x ] weights [ x ] labs, potassium/ phosphorus  [ x ] follow up per protocol  [x ] other: I/O Assessment:   Pt adm c perforated viscus, bandemia, abdominal pain, c  YUNIEL, CKD on HD, s/p perm cathter placement 06/17/19, poor oral intake c improvement, s/p exploratory laparotomy, yusef patch repair  PMH include gout, hepatitis C, pancreatitis, ETOH abuse.  Pt c c/o not receiving foods as requested.  Pt provided c dialysis diet education, however pt appeared not ready to make diet/lifestyle changes.     Factors impacting intake: [x ] none [ ] nausea  [ ] vomiting [ ] diarrhea [ ] constipation  [ ]chewing problems [ ] swallowing issues  [ ] other:     Diet Prescription: Diet, Renal Restrictions:   For patients receiving Renal Replacement - No Protein Restr, No Conc K, No Conc Phos, Low Sodium  Supplement Feeding Modality:  Oral  Nepro Cans or Servings Per Day:  1       Frequency:  Two Times a day (06-13-19 @ 12:41)    Intake: >75%, dislikes Nepro, requesting vanilla Ensure    Current Weight: 06/18/19, 66.5 kg, 05/16/19, 62.7 kg, c wt. gain of 3.8 kg  % Weight Change: 6.0%  06/17, 2+ edema of legs noted   I/0: pt reports to void, 06/12/19, urine output 150 ml     Pertinent Medications: MEDICATIONS  (STANDING):  amLODIPine   Tablet 10 milliGRAM(s) Oral daily  calcium acetate 1334 milliGRAM(s) Oral three times a day with meals  chlorhexidine 4% Liquid 1 Application(s) Topical <User Schedule>  heparin  Injectable 5000 Unit(s) SubCutaneous every 12 hours  pantoprazole    Tablet 40 milliGRAM(s) Oral every 12 hours  sucralfate 1 Gram(s) Oral every 6 hours    MEDICATIONS  (PRN):  oxyCODONE    5 mG/acetaminophen 325 mG 2 Tablet(s) Oral every 4 hours PRN Moderate Pain (4 - 6)  traMADol 50 milliGRAM(s) Oral every 12 hours PRN Moderate Pain (4 - 6)    Pertinent Labs: 06-17 Na147 mmol/L<H> Glu 81 mg/dL K+ 3.4 mmol/L<L> Cr  4.79 mg/dL<H> BUN 21 mg/dL 06-17 Phos 3.7 mg/dL 06-12 Alb 0.7 g/dL<L>     CAPILLARY BLOOD GLUCOSE      POCT Blood Glucose.: 126 mg/dL (18 Jun 2019 07:33)  POCT Blood Glucose.: 90 mg/dL (17 Jun 2019 17:25)  POCT Blood Glucose.: 85 mg/dL (17 Jun 2019 10:45)    Skin: pressure ulcer x 1  1.sacrum stage II    Estimated Needs:   [x ] no change since previous assessment(05/17/19)  [ ] recalculated:     Previous Nutrition Diagnosis:   Malnutrition; severe malnutrition in context of acute illness(05/28/19)     Related to: inadequate protein-energy intake in setting of gastric perforation, s/p exploratory laparotomy, yusef patch ;     (06/13)--> + increased protein needs with initiation of HD 06/12    As evidenced by: <50 protein-energy needs met x ~1month , <50% of meals x 26 days; + moderate-severe subq fat loss, and muscle waisting     Nutrition Diagnosis is [x  ] ongoing  [ ] resolved  [ x ] improved x1 day  [ ] not applicable     Previous goal:  Pt to consume >75% of protein-energy needs via meals; met >75% of supplements; not met   maintain wt +/-2 LBS; unable to ascertain due to edema    Nutrition Diagnosis is [ x] ongoing  [ ] resolved [ ] not applicable       New Nutrition Diagnosis: [ x] not applicable       Interventions:   Recommend  [x ] Change Diet To:  Low sodium, (no potassium restriction @ present due to low potassium levels)Ensure Enlive 1 x /day=375 calories, 20 grams protein, fluids allowance as per I/O   [ ] Nutrition Supplement  [ ] Nutrition Support  [x ] Other: Nephro-Candy daily     Monitoring and Evaluation:   [ x] PO intake [ x ] Tolerance to diet prescription [ x ] weights [ x ] labs, potassium, phosphorus, sodium [ x ] follow up per protocol  [x ] other: I/O

## 2019-06-18 NOTE — PROGRESS NOTE ADULT - PROBLEM SELECTOR PLAN 1
sepsis POA  - retrogastric collection and/or lateral perihepatic/subcapsular collection drained by IR on 5/22 (250cc drained)  - OR & blood cultures grew MSSA, NGTD on repeat blood cx from 5/28  - nafcillin and flagyl witched to Ceftriaxone, due to worsening renal function  - TTE on 5/21 showed EF 60-65% w/ no evidence of valvular vegetation & ISAIAS on 6/4 was negative  - MRI of thoracolumbar spine was unremarkable & did not show diskitis /OM, Cervical spine MRI only showed chronic degenerative changes of C3-C6, with multilevel sac effacement   s/p removal of CINDY drain yesterday  no collection on CT scan

## 2019-06-18 NOTE — PROGRESS NOTE ADULT - SUBJECTIVE AND OBJECTIVE BOX
Patient s/p conversion of temporary to tunneled dialysis catheter.  Pt denies any new complaints.  Insertion site is CDI.  There is no swelling to the right neck or chest.  The RUE has no new swelling.  The doppler shows no DVT or superficial thrombus.  As per the dialysis notes the catheter worked optimally      PHYSICAL EXAM:    Vital Signs Last 24 Hrs  T(C): 37.3 (18 Jun 2019 05:38), Max: 37.3 (18 Jun 2019 05:38)  T(F): 99.2 (18 Jun 2019 05:38), Max: 99.2 (18 Jun 2019 05:38)  HR: 94 (18 Jun 2019 05:38) (80 - 94)  BP: 124/73 (18 Jun 2019 05:38) (118/72 - 155/92)  BP(mean): --  RR: 18 (18 Jun 2019 05:38) (16 - 20)  SpO2: 95% (18 Jun 2019 05:38) (95% - 100%)    General:  A & O x3, NAD  Neck/Chest:  As above  Lungs:  CTAB  Cardiovascular:  good S1, S2,   Abdomen:  Soft, non-tender, non-distended, normoactive bowel sounds, no HSM  Extremities:  as above        LABS:                        7.3    7.59  )-----------( 260      ( 17 Jun 2019 08:11 )             23.2     06-17    147<H>  |  112<H>  |  21  ----------------------------<  81  3.4<L>   |  27  |  4.79<H>    Ca    7.4<L>      17 Jun 2019 08:11  Phos  3.7     06-17  Mg     1.9     06-17            RADIOLOGY & ADDITIONAL STUDIES:

## 2019-06-18 NOTE — DISCHARGE NOTE PROVIDER - NSDCCPCAREPLAN_GEN_ALL_CORE_FT
PRINCIPAL DISCHARGE DIAGNOSIS  Diagnosis: Perforated viscus  Assessment and Plan of Treatment: with sepsis POA, resolved, CINDY drain out, completed antibiotics, hemodynamically stable, follow-up with surgery as outpatient      SECONDARY DISCHARGE DIAGNOSES  Diagnosis: Acute bacterial peritonitis  Assessment and Plan of Treatment: resolved, completed antibiotics    Diagnosis: Anemia  Assessment and Plan of Treatment: multifactorial, acute blood loss post-operative, anemia of chronic disease, manage as per nephrology in dialysis    Diagnosis: Abscess of abdominal cavity  Assessment and Plan of Treatment: resolved on most recent cat scan, drain taken out    Diagnosis: ATN (acute tubular necrosis)  Assessment and Plan of Treatment: improving but needs dialysis for now, continue with dialysis and to be followed by Dr. Dukes/nephrology

## 2019-06-18 NOTE — PROGRESS NOTE ADULT - ASSESSMENT
59 yo male s/p conversion of temporary to tunneled dialysis catheter.  Pt now HD dependant.  Dialysis catheter working optimally

## 2019-06-18 NOTE — DISCHARGE NOTE PROVIDER - CARE PROVIDER_API CALL
Austin Dukes)  Internal Medicine; Nephrology  [address] [address] [facility], [address]  [place], [place] [id number]  Phone: [phone]  Fax: [phone]  Follow Up Time:     Brayden Contreras)  Internal Medicine  [address], [address]  [place] [address]  Phone: [phone]  Fax: [phone]  Follow Up Time:

## 2019-06-19 LAB
ANION GAP SERPL CALC-SCNC: 6 MMOL/L — SIGNIFICANT CHANGE UP (ref 5–17)
BUN SERPL-MCNC: 15 MG/DL — SIGNIFICANT CHANGE UP (ref 7–23)
CALCIUM SERPL-MCNC: 7.1 MG/DL — LOW (ref 8.5–10.1)
CHLORIDE SERPL-SCNC: 111 MMOL/L — HIGH (ref 96–108)
CO2 SERPL-SCNC: 30 MMOL/L — SIGNIFICANT CHANGE UP (ref 22–31)
CREAT SERPL-MCNC: 3.48 MG/DL — HIGH (ref 0.5–1.3)
GLUCOSE BLDC GLUCOMTR-MCNC: 114 MG/DL — HIGH (ref 70–99)
GLUCOSE BLDC GLUCOMTR-MCNC: 83 MG/DL — SIGNIFICANT CHANGE UP (ref 70–99)
GLUCOSE BLDC GLUCOMTR-MCNC: 95 MG/DL — SIGNIFICANT CHANGE UP (ref 70–99)
GLUCOSE BLDC GLUCOMTR-MCNC: 97 MG/DL — SIGNIFICANT CHANGE UP (ref 70–99)
GLUCOSE SERPL-MCNC: 94 MG/DL — SIGNIFICANT CHANGE UP (ref 70–99)
HCT VFR BLD CALC: 28.8 % — LOW (ref 39–50)
HGB BLD-MCNC: 9.2 G/DL — LOW (ref 13–17)
IGA FLD-MCNC: 580 MG/DL — HIGH (ref 84–499)
IGG FLD-MCNC: 2128 MG/DL — HIGH (ref 610–1660)
IGM SERPL-MCNC: 100 MG/DL — SIGNIFICANT CHANGE UP (ref 35–242)
KAPPA LC SER QL IFE: 15.8 MG/DL — HIGH (ref 0.33–1.94)
KAPPA/LAMBDA FREE LIGHT CHAIN RATIO, SERUM: 0.68 RATIO — SIGNIFICANT CHANGE UP (ref 0.26–1.65)
LAMBDA LC SER QL IFE: 23.24 MG/DL — HIGH (ref 0.57–2.63)
MCHC RBC-ENTMCNC: 28.4 PG — SIGNIFICANT CHANGE UP (ref 27–34)
MCHC RBC-ENTMCNC: 31.9 GM/DL — LOW (ref 32–36)
MCV RBC AUTO: 88.9 FL — SIGNIFICANT CHANGE UP (ref 80–100)
NRBC # BLD: 0 /100 WBCS — SIGNIFICANT CHANGE UP (ref 0–0)
PLATELET # BLD AUTO: 193 K/UL — SIGNIFICANT CHANGE UP (ref 150–400)
POTASSIUM SERPL-MCNC: 3.2 MMOL/L — LOW (ref 3.5–5.3)
POTASSIUM SERPL-SCNC: 3.2 MMOL/L — LOW (ref 3.5–5.3)
RBC # BLD: 3.24 M/UL — LOW (ref 4.2–5.8)
RBC # FLD: 21.1 % — HIGH (ref 10.3–14.5)
RHEUMATOID FACT SERPL-ACNC: <10 IU/ML — SIGNIFICANT CHANGE UP (ref 0–13)
SODIUM SERPL-SCNC: 147 MMOL/L — HIGH (ref 135–145)
WBC # BLD: 6.62 K/UL — SIGNIFICANT CHANGE UP (ref 3.8–10.5)
WBC # FLD AUTO: 6.62 K/UL — SIGNIFICANT CHANGE UP (ref 3.8–10.5)

## 2019-06-19 PROCEDURE — 99233 SBSQ HOSP IP/OBS HIGH 50: CPT

## 2019-06-19 RX ORDER — POTASSIUM CHLORIDE 20 MEQ
40 PACKET (EA) ORAL ONCE
Refills: 0 | Status: COMPLETED | OUTPATIENT
Start: 2019-06-19 | End: 2019-06-19

## 2019-06-19 RX ADMIN — OXYCODONE AND ACETAMINOPHEN 2 TABLET(S): 5; 325 TABLET ORAL at 06:36

## 2019-06-19 RX ADMIN — Medication 1334 MILLIGRAM(S): at 08:05

## 2019-06-19 RX ADMIN — AMLODIPINE BESYLATE 10 MILLIGRAM(S): 2.5 TABLET ORAL at 05:36

## 2019-06-19 RX ADMIN — OXYCODONE AND ACETAMINOPHEN 2 TABLET(S): 5; 325 TABLET ORAL at 16:25

## 2019-06-19 RX ADMIN — HEPARIN SODIUM 5000 UNIT(S): 5000 INJECTION INTRAVENOUS; SUBCUTANEOUS at 17:27

## 2019-06-19 RX ADMIN — Medication 1 MILLIGRAM(S): at 16:29

## 2019-06-19 RX ADMIN — Medication 1000 UNIT(S): at 16:29

## 2019-06-19 RX ADMIN — HEPARIN SODIUM 5000 UNIT(S): 5000 INJECTION INTRAVENOUS; SUBCUTANEOUS at 05:38

## 2019-06-19 RX ADMIN — OXYCODONE AND ACETAMINOPHEN 2 TABLET(S): 5; 325 TABLET ORAL at 17:00

## 2019-06-19 RX ADMIN — Medication 1334 MILLIGRAM(S): at 16:28

## 2019-06-19 RX ADMIN — OXYCODONE AND ACETAMINOPHEN 2 TABLET(S): 5; 325 TABLET ORAL at 11:05

## 2019-06-19 RX ADMIN — OXYCODONE AND ACETAMINOPHEN 2 TABLET(S): 5; 325 TABLET ORAL at 23:30

## 2019-06-19 RX ADMIN — OXYCODONE AND ACETAMINOPHEN 2 TABLET(S): 5; 325 TABLET ORAL at 05:36

## 2019-06-19 RX ADMIN — CHLORHEXIDINE GLUCONATE 1 APPLICATION(S): 213 SOLUTION TOPICAL at 08:07

## 2019-06-19 RX ADMIN — Medication 40 MILLIEQUIVALENT(S): at 19:44

## 2019-06-19 RX ADMIN — PANTOPRAZOLE SODIUM 40 MILLIGRAM(S): 20 TABLET, DELAYED RELEASE ORAL at 17:27

## 2019-06-19 RX ADMIN — OXYCODONE AND ACETAMINOPHEN 2 TABLET(S): 5; 325 TABLET ORAL at 22:47

## 2019-06-19 RX ADMIN — Medication 100 MILLIGRAM(S): at 16:29

## 2019-06-19 RX ADMIN — OXYCODONE AND ACETAMINOPHEN 2 TABLET(S): 5; 325 TABLET ORAL at 12:00

## 2019-06-19 RX ADMIN — PANTOPRAZOLE SODIUM 40 MILLIGRAM(S): 20 TABLET, DELAYED RELEASE ORAL at 08:05

## 2019-06-19 RX ADMIN — Medication 1 TABLET(S): at 16:29

## 2019-06-19 NOTE — PROGRESS NOTE ADULT - PROBLEM SELECTOR PLAN 2
- initially due to ATN, now renal suspects Gentamicin nephrotoxicity, AIN, septic ATN  - renal following, started HD on 6/12, nephro feels will ultimately recover, but will need HD on a temporary basis, will not recover on this admission, so should go to rehab with HD and be followed closely by Dr. Dueks

## 2019-06-19 NOTE — PROGRESS NOTE ADULT - SUBJECTIVE AND OBJECTIVE BOX
Patient feels well no complaints today.    MEDICATIONS  (STANDING):  amLODIPine   Tablet 10 milliGRAM(s) Oral daily  calcium acetate 1334 milliGRAM(s) Oral three times a day with meals  chlorhexidine 4% Liquid 1 Application(s) Topical <User Schedule>  cholecalciferol 1000 Unit(s) Oral daily  folic acid 1 milliGRAM(s) Oral daily  heparin  Injectable 5000 Unit(s) SubCutaneous every 12 hours  multivitamin 1 Tablet(s) Oral daily  pantoprazole    Tablet 40 milliGRAM(s) Oral every 12 hours  sucralfate 1 Gram(s) Oral every 6 hours  thiamine 100 milliGRAM(s) Oral daily    MEDICATIONS  (PRN):  oxyCODONE    5 mG/acetaminophen 325 mG 2 Tablet(s) Oral every 4 hours PRN Moderate Pain (4 - 6)  traMADol 50 milliGRAM(s) Oral every 12 hours PRN Moderate Pain (4 - 6)      06-18-19 @ 07:01  -  06-19-19 @ 07:00  --------------------------------------------------------  IN: 340 mL / OUT: 0 mL / NET: 340 mL    06-19-19 @ 07:01  -  06-19-19 @ 16:19  --------------------------------------------------------  IN: 300 mL / OUT: 0 mL / NET: 300 mL      PHYSICAL EXAM:      T(C): 37 (06-19-19 @ 12:15), Max: 37.3 (06-19-19 @ 06:52)  HR: 81 (06-19-19 @ 12:15) (80 - 101)  BP: 139/79 (06-19-19 @ 12:15) (129/74 - 148/84)  RR: 18 (06-19-19 @ 12:15) (17 - 18)  SpO2: 100% (06-19-19 @ 12:15) (100% - 100%)  Wt(kg): --  Respiratory: clear anteriorly, decreased BS at bases  Cardiovascular: S1 S2  Gastrointestinal: soft NT ND +BS  Extremities:  1 edema                                    9.2    6.62  )-----------( 193      ( 19 Jun 2019 11:36 )             28.8     06-19    147<H>  |  111<H>  |  15  ----------------------------<  94  3.2<L>   |  30  |  3.48<H>    Ca    7.1<L>      19 Jun 2019 11:36          Creatinine Trend: 3.48<--, 4.79<--, 4.40<--, 4.05<--, 5.05<--, 6.10<--  Assessment and Plan:    YUNIEL CKD; suspected infectious GN vs Abx related injury ( AIN/ ATN);   HD dependent for now;   Replete K; follow course, discharge planning.

## 2019-06-19 NOTE — PROGRESS NOTE ADULT - ATTENDING COMMENTS
I agree with the findings and assessment and plan.
I agree with the findings and the assessment and plan.
as noted above.
still critical. continue current care.
as noted above.
awaiting placement in rehab

## 2019-06-19 NOTE — PROGRESS NOTE ADULT - ASSESSMENT
57 yo M w/ history of gout, ETOH abuse, chronic pancreatitis, hep C s/p treatment,p/w YUNIEL & sepsis POA from gastric perforation and purulent peritonitis & had ex-lap, yusef patch repair, peritoneal lavage on 5/16. His post op course was complicated by intraabdominal collection s/p IR perc drainage on 5/22 & MSSA bacteremia & YUNIEL.  Pt. was on surgical service than changed to medicine after major surgery issues resolved. Hospital course c/b YUNIEL, not fully recovering and renal feels he will need temporary HD in the community.

## 2019-06-19 NOTE — PROGRESS NOTE ADULT - PROBLEM SELECTOR PLAN 1
sepsis POA  - retrogastric collection and/or lateral perihepatic/subcapsular collection drained by IR on 5/22 (250cc drained)  - OR & blood cultures grew MSSA, NGTD on repeat blood cx from 5/28  - nafcillin and flagyl witched to Ceftriaxone, due to worsening renal function  - TTE on 5/21 showed EF 60-65% w/ no evidence of valvular vegetation & ISAIAS on 6/4 was negative  - MRI of thoracolumbar spine was unremarkable & did not show diskitis /OM, Cervical spine MRI only showed chronic degenerative changes of C3-C6, with multilevel sac effacement   s/p removal of CINDY drain   no collection on CT scan

## 2019-06-19 NOTE — PROGRESS NOTE ADULT - SUBJECTIVE AND OBJECTIVE BOX
Patient is a 58y old  Male who presents with a chief complaint of Abdominal pain, vomiting (18 Jun 2019 17:35)      INTERVAL HPI/OVERNIGHT EVENTS:    MEDICATIONS  (STANDING):  amLODIPine   Tablet 10 milliGRAM(s) Oral daily  calcium acetate 1334 milliGRAM(s) Oral three times a day with meals  chlorhexidine 4% Liquid 1 Application(s) Topical <User Schedule>  cholecalciferol 1000 Unit(s) Oral daily  folic acid 1 milliGRAM(s) Oral daily  heparin  Injectable 5000 Unit(s) SubCutaneous every 12 hours  multivitamin 1 Tablet(s) Oral daily  pantoprazole    Tablet 40 milliGRAM(s) Oral every 12 hours  sucralfate 1 Gram(s) Oral every 6 hours  thiamine 100 milliGRAM(s) Oral daily    MEDICATIONS  (PRN):  oxyCODONE    5 mG/acetaminophen 325 mG 2 Tablet(s) Oral every 4 hours PRN Moderate Pain (4 - 6)  traMADol 50 milliGRAM(s) Oral every 12 hours PRN Moderate Pain (4 - 6)      Allergies    No Known Allergies    Intolerances        REVIEW OF SYSTEMS:  CONSTITUTIONAL: No fever, weight loss, or fatigue  EYES: No eye pain, visual disturbances, or discharge  ENMT:  No difficulty hearing, tinnitus, vertigo; No sinus or throat pain  NECK: No pain or stiffness  BREASTS: No pain, masses, or nipple discharge  RESPIRATORY: No cough, wheezing, chills or hemoptysis; No shortness of breath  CARDIOVASCULAR: No chest pain, palpitations, dizziness, or leg swelling  GASTROINTESTINAL: No abdominal or epigastric pain. No nausea, vomiting, or hematemesis; No diarrhea or constipation. No melena or hematochezia.  GENITOURINARY: No dysuria, frequency, hematuria, or incontinence  NEUROLOGICAL: No headaches, memory loss, loss of strength, numbness, or tremors  SKIN: No itching, burning, rashes, or lesions   LYMPH NODES: No enlarged glands  ENDOCRINE: No heat or cold intolerance; No hair loss  MUSCULOSKELETAL: No joint pain or swelling; No muscle, back, or extremity pain  PSYCHIATRIC: No depression, anxiety, mood swings, or difficulty sleeping  HEME/LYMPH: No easy bruising, or bleeding gums  ALLERGY AND IMMUNOLOGIC: No hives or eczema    Vital Signs Last 24 Hrs  T(C): 37 (19 Jun 2019 12:15), Max: 37.3 (19 Jun 2019 06:52)  T(F): 98.6 (19 Jun 2019 12:15), Max: 99.1 (19 Jun 2019 06:52)  HR: 81 (19 Jun 2019 12:15) (80 - 101)  BP: 139/79 (19 Jun 2019 12:15) (129/74 - 148/84)  BP(mean): --  RR: 18 (19 Jun 2019 12:15) (17 - 19)  SpO2: 100% (19 Jun 2019 12:15) (100% - 100%)    PHYSICAL EXAM:  GENERAL: NAD, well-groomed, well-developed  HEAD:  Atraumatic, Normocephalic  EYES: EOMI, PERRLA, conjunctiva and sclera clear  ENMT: No tonsillar erythema, exudates, or enlargement; Moist mucous membranes, Good dentition, No lesions  NECK: Supple, No JVD, Normal thyroid  NERVOUS SYSTEM:  Alert & Oriented X3, Good concentration; Motor Strength 5/5 B/L upper and lower extremities; DTRs 2+ intact and symmetric  CHEST/LUNG: Clear to percussion bilaterally; No rales, rhonchi, wheezing, or rubs  HEART: Regular rate and rhythm; No murmurs, rubs, or gallops  ABDOMEN: Soft, Nontender, Nondistended; Bowel sounds present  EXTREMITIES:  2+ Peripheral Pulses, No clubbing, cyanosis, or edema  LYMPH: No lymphadenopathy noted  SKIN: No rashes or lesions    LABS:                        9.2    6.62  )-----------( 193      ( 19 Jun 2019 11:36 )             28.8     06-19    147<H>  |  111<H>  |  15  ----------------------------<  94  3.2<L>   |  30  |  3.48<H>    Ca    7.1<L>      19 Jun 2019 11:36          CAPILLARY BLOOD GLUCOSE      POCT Blood Glucose.: 114 mg/dL (19 Jun 2019 10:49)  POCT Blood Glucose.: 95 mg/dL (19 Jun 2019 07:37)  POCT Blood Glucose.: 98 mg/dL (18 Jun 2019 22:10)  POCT Blood Glucose.: 79 mg/dL (18 Jun 2019 16:13)      RADIOLOGY & ADDITIONAL TESTS:    Imaging Personally Reviewed:  [ ] YES  [ ] NO    Consultant(s) Notes Reviewed:  [ ] YES  [ ] NO    Care Discussed with Consultants/Other Providers [ ] YES  [ ] NO Patient is a 58y old  Male who presents with a chief complaint of Abdominal pain, vomiting (18 Jun 2019 17:35)      INTERVAL HPI/OVERNIGHT EVENTS:  tired, says he's not getting a good sleep because of all the interruptions.  still with soft stools, not watery    MEDICATIONS  (STANDING):  amLODIPine   Tablet 10 milliGRAM(s) Oral daily  calcium acetate 1334 milliGRAM(s) Oral three times a day with meals  chlorhexidine 4% Liquid 1 Application(s) Topical <User Schedule>  cholecalciferol 1000 Unit(s) Oral daily  folic acid 1 milliGRAM(s) Oral daily  heparin  Injectable 5000 Unit(s) SubCutaneous every 12 hours  multivitamin 1 Tablet(s) Oral daily  pantoprazole    Tablet 40 milliGRAM(s) Oral every 12 hours  sucralfate 1 Gram(s) Oral every 6 hours  thiamine 100 milliGRAM(s) Oral daily    MEDICATIONS  (PRN):  oxyCODONE    5 mG/acetaminophen 325 mG 2 Tablet(s) Oral every 4 hours PRN Moderate Pain (4 - 6)  traMADol 50 milliGRAM(s) Oral every 12 hours PRN Moderate Pain (4 - 6)      Allergies    No Known Allergies    Intolerances        REVIEW OF SYSTEMS:  CONSTITUTIONAL: No fever, + weight loss, or fatigue  EYES: No eye pain, visual disturbances, or discharge  ENMT:  No difficulty hearing, tinnitus, vertigo; No sinus or throat pain  RESPIRATORY: No cough, wheezing, chills or hemoptysis; No shortness of breath  CARDIOVASCULAR: No chest pain, palpitations, dizziness, or leg swelling  GASTROINTESTINAL: + loose stools, mild crampy abd pain relieved with BM  GENITOURINARY: No dysuria, frequency, hematuria, or incontinence  NEUROLOGICAL: No headaches, memory loss, loss of strength, numbness, or tremors  SKIN: No itching, burning, rashes, or lesions   MUSCULOSKELETAL: No joint pain or swelling; No muscle, back, or extremity pain  PSYCHIATRIC: No depression, anxiety, mood swings, or difficulty sleeping      Vital Signs Last 24 Hrs  T(C): 37 (19 Jun 2019 12:15), Max: 37.3 (19 Jun 2019 06:52)  T(F): 98.6 (19 Jun 2019 12:15), Max: 99.1 (19 Jun 2019 06:52)  HR: 81 (19 Jun 2019 12:15) (80 - 101)  BP: 139/79 (19 Jun 2019 12:15) (129/74 - 148/84)  BP(mean): --  RR: 18 (19 Jun 2019 12:15) (17 - 19)  SpO2: 100% (19 Jun 2019 12:15) (100% - 100%)    PHYSICAL EXAM:  GENERAL: NAD, thin  HEAD:  Atraumatic, Normocephalic  EYES: EOMI, PERRLA, conjunctiva and sclera clear  ENMT: No tonsillar erythema, exudates, or enlargement; Moist mucous membranes,   NECK: Supple, No JVD, Normal thyroid  NERVOUS SYSTEM:  Alert & Oriented X3, Good concentration; Motor Strength 4/5 B/L upper and lower extremities;   CHEST/LUNG: Clear to percussion bilaterally; No rales, rhonchi, wheezing, or rubs  HEART: Regular rate and rhythm; No murmurs, rubs, or gallops  ABDOMEN: Soft, Nontender, Nondistended; Bowel sounds present  EXTREMITIES:  2+ Peripheral Pulses, No clubbing, cyanosis, or edema  LYMPH: No lymphadenopathy noted  SKIN: No rashes or lesions    LABS:                        9.2    6.62  )-----------( 193      ( 19 Jun 2019 11:36 )             28.8     06-19    147<H>  |  111<H>  |  15  ----------------------------<  94  3.2<L>   |  30  |  3.48<H>    Ca    7.1<L>      19 Jun 2019 11:36          CAPILLARY BLOOD GLUCOSE      POCT Blood Glucose.: 114 mg/dL (19 Jun 2019 10:49)  POCT Blood Glucose.: 95 mg/dL (19 Jun 2019 07:37)  POCT Blood Glucose.: 98 mg/dL (18 Jun 2019 22:10)  POCT Blood Glucose.: 79 mg/dL (18 Jun 2019 16:13)      RADIOLOGY & ADDITIONAL TESTS:    Imaging Personally Reviewed:  [ ] YES  [ ] NO    Consultant(s) Notes Reviewed:  x[ ] YES  [ ] NO    Care Discussed with Consultants/Other Providers [ x] YES  [ ] NO

## 2019-06-20 LAB
GLUCOSE BLDC GLUCOMTR-MCNC: 109 MG/DL — HIGH (ref 70–99)
GLUCOSE BLDC GLUCOMTR-MCNC: 113 MG/DL — HIGH (ref 70–99)
GLUCOSE BLDC GLUCOMTR-MCNC: 115 MG/DL — HIGH (ref 70–99)
GLUCOSE BLDC GLUCOMTR-MCNC: 80 MG/DL — SIGNIFICANT CHANGE UP (ref 70–99)
PROT SERPL-MCNC: 5.9 G/DL — LOW (ref 6–8.3)
PROT SERPL-MCNC: 5.9 G/DL — LOW (ref 6–8.3)

## 2019-06-20 PROCEDURE — 99233 SBSQ HOSP IP/OBS HIGH 50: CPT

## 2019-06-20 RX ADMIN — Medication 1 TABLET(S): at 11:34

## 2019-06-20 RX ADMIN — OXYCODONE AND ACETAMINOPHEN 2 TABLET(S): 5; 325 TABLET ORAL at 09:32

## 2019-06-20 RX ADMIN — OXYCODONE AND ACETAMINOPHEN 2 TABLET(S): 5; 325 TABLET ORAL at 04:07

## 2019-06-20 RX ADMIN — Medication 1334 MILLIGRAM(S): at 08:01

## 2019-06-20 RX ADMIN — OXYCODONE AND ACETAMINOPHEN 2 TABLET(S): 5; 325 TABLET ORAL at 04:44

## 2019-06-20 RX ADMIN — PANTOPRAZOLE SODIUM 40 MILLIGRAM(S): 20 TABLET, DELAYED RELEASE ORAL at 17:15

## 2019-06-20 RX ADMIN — Medication 1334 MILLIGRAM(S): at 17:12

## 2019-06-20 RX ADMIN — OXYCODONE AND ACETAMINOPHEN 2 TABLET(S): 5; 325 TABLET ORAL at 22:06

## 2019-06-20 RX ADMIN — OXYCODONE AND ACETAMINOPHEN 2 TABLET(S): 5; 325 TABLET ORAL at 08:07

## 2019-06-20 RX ADMIN — Medication 100 MILLIGRAM(S): at 11:34

## 2019-06-20 RX ADMIN — HEPARIN SODIUM 5000 UNIT(S): 5000 INJECTION INTRAVENOUS; SUBCUTANEOUS at 17:12

## 2019-06-20 RX ADMIN — Medication 1 MILLIGRAM(S): at 11:34

## 2019-06-20 RX ADMIN — HEPARIN SODIUM 5000 UNIT(S): 5000 INJECTION INTRAVENOUS; SUBCUTANEOUS at 05:56

## 2019-06-20 RX ADMIN — OXYCODONE AND ACETAMINOPHEN 2 TABLET(S): 5; 325 TABLET ORAL at 21:14

## 2019-06-20 RX ADMIN — OXYCODONE AND ACETAMINOPHEN 2 TABLET(S): 5; 325 TABLET ORAL at 18:39

## 2019-06-20 RX ADMIN — PANTOPRAZOLE SODIUM 40 MILLIGRAM(S): 20 TABLET, DELAYED RELEASE ORAL at 08:01

## 2019-06-20 RX ADMIN — OXYCODONE AND ACETAMINOPHEN 2 TABLET(S): 5; 325 TABLET ORAL at 17:14

## 2019-06-20 RX ADMIN — Medication 1000 UNIT(S): at 11:34

## 2019-06-20 RX ADMIN — AMLODIPINE BESYLATE 10 MILLIGRAM(S): 2.5 TABLET ORAL at 05:57

## 2019-06-20 RX ADMIN — Medication 1334 MILLIGRAM(S): at 11:34

## 2019-06-20 NOTE — PROGRESS NOTE ADULT - PROBLEM SELECTOR PLAN 4
Post operative anemia on chronic anemia  - watch Hgb and transfuse if Hgb<7   - received 3 units on 5/16 & 1 unit on 6/2  1 unit on 6/17 in HD, hgb: 9.2 on 6/19/19

## 2019-06-20 NOTE — PROGRESS NOTE ADULT - SUBJECTIVE AND OBJECTIVE BOX
Subjective: urinates at least 100cc of urine 3-5 times per day.       MEDICATIONS  (STANDING):  amLODIPine   Tablet 10 milliGRAM(s) Oral daily  calcium acetate 1334 milliGRAM(s) Oral three times a day with meals  chlorhexidine 4% Liquid 1 Application(s) Topical <User Schedule>  cholecalciferol 1000 Unit(s) Oral daily  folic acid 1 milliGRAM(s) Oral daily  heparin  Injectable 5000 Unit(s) SubCutaneous every 12 hours  multivitamin 1 Tablet(s) Oral daily  pantoprazole    Tablet 40 milliGRAM(s) Oral every 12 hours  sucralfate 1 Gram(s) Oral every 6 hours  thiamine 100 milliGRAM(s) Oral daily    MEDICATIONS  (PRN):  oxyCODONE    5 mG/acetaminophen 325 mG 2 Tablet(s) Oral every 4 hours PRN Moderate Pain (4 - 6)          T(C): 36.8 (06-20-19 @ 05:56), Max: 37.1 (06-19-19 @ 23:56)  HR: 92 (06-20-19 @ 05:56) (81 - 101)  BP: 143/86 (06-20-19 @ 05:56) (127/73 - 150/88)  RR: 18 (06-20-19 @ 05:56) (17 - 18)  SpO2: 100% (06-20-19 @ 05:56) (99% - 100%)  Wt(kg): --        I&O's Detail    19 Jun 2019 07:01  -  20 Jun 2019 07:00  --------------------------------------------------------  IN:    Oral Fluid: 300 mL  Total IN: 300 mL    OUT:  Total OUT: 0 mL    Total NET: 300 mL               PHYSICAL EXAM:    GENERAL: comfortable.   EYES: EOMI, PERRLA, conjunctiva and sclera clear  NECK: Supple, no inc in JVP  CHEST/LUNG: Clear  HEART: S1S2  ABDOMEN: Soft, midline scar post recent Sx. RUQ drain.   EXTREMITIES:  trace edema  NEURO: no asterixis        LABS:  CBC Full  -  ( 19 Jun 2019 11:36 )  WBC Count : 6.62 K/uL  RBC Count : 3.24 M/uL  Hemoglobin : 9.2 g/dL  Hematocrit : 28.8 %  Platelet Count - Automated : 193 K/uL  Mean Cell Volume : 88.9 fl  Mean Cell Hemoglobin : 28.4 pg  Mean Cell Hemoglobin Concentration : 31.9 gm/dL  Auto Neutrophil # : x  Auto Lymphocyte # : x  Auto Monocyte # : x  Auto Eosinophil # : x  Auto Basophil # : x  Auto Neutrophil % : x  Auto Lymphocyte % : x  Auto Monocyte % : x  Auto Eosinophil % : x  Auto Basophil % : x    06-19    147<H>  |  111<H>  |  15  ----------------------------<  94  3.2<L>   |  30  |  3.48<H>    Ca    7.1<L>      19 Jun 2019 11:36          Impression:  * YUNIEL -- DDx: Gentamicin nephrotoxicity, septic ATN, PIGN. Has been HD dependent. ? increasing UO  * Perforated viscus complicated by peritonitis, intra-abd collection  * S/p David patch repair  * MSSA bacteremia    Recommendations:   * Follow repeat K this am. Supplement cautiously PRN.   * Next HD tomorrow following re-eval  * BMP in am

## 2019-06-20 NOTE — PROGRESS NOTE ADULT - ASSESSMENT
59 yo M w/ history of gout, ETOH abuse, chronic pancreatitis, hep C s/p treatment,p/w YUNIEL & sepsis POA from gastric perforation and purulent peritonitis & had ex-lap, yusef patch repair, peritoneal lavage on 5/16. His post op course was complicated by intraabdominal collection s/p IR perc drainage on 5/22 & MSSA bacteremia & YUNIEL.  Pt. was on surgical service than changed to medicine after major surgery issues resolved. Hospital course c/b YUNIEL, not fully recovering and renal feels he will need temporary HD in the community.

## 2019-06-20 NOTE — PROGRESS NOTE ADULT - PROBLEM SELECTOR PLAN 1
- with sepsis : POA  - retrogastric collection and/or lateral perihepatic/subcapsular collection drained by IR on 5/22 (250cc drained)  - OR & blood cultures grew MSSA, NGTD on repeat blood cx from 5/28  - nafcillin and flagyl switched to Ceftriaxone, due to worsening renal function  - TTE on 5/21 showed EF 60-65% w/ no evidence of valvular vegetation & ISAIAS on 6/4 was negative  - MRI of thoracolumbar spine was unremarkable & did not show diskitis /OM, Cervical spine MRI only showed chronic degenerative changes of C3-C6, with multilevel sac effacement   s/p removal of CINDY drain   no collection on CT scan  - Discharge planning to [facility]

## 2019-06-20 NOTE — PROGRESS NOTE ADULT - SUBJECTIVE AND OBJECTIVE BOX
Patient is a 58y old  Male who presents with a chief complaint of Abdominal pain, vomiting (19 Jun 2019 20:04), he was seen and examined, has no abdominal pain at present.       OVERNIGHT EVENTS: none    MEDICATIONS  (STANDING):  amLODIPine   Tablet 10 milliGRAM(s) Oral daily  calcium acetate 1334 milliGRAM(s) Oral three times a day with meals  chlorhexidine 4% Liquid 1 Application(s) Topical <User Schedule>  cholecalciferol 1000 Unit(s) Oral daily  folic acid 1 milliGRAM(s) Oral daily  heparin  Injectable 5000 Unit(s) SubCutaneous every 12 hours  multivitamin 1 Tablet(s) Oral daily  pantoprazole    Tablet 40 milliGRAM(s) Oral every 12 hours  sucralfate 1 Gram(s) Oral every 6 hours  thiamine 100 milliGRAM(s) Oral daily    MEDICATIONS  (PRN):  oxyCODONE    5 mG/acetaminophen 325 mG 2 Tablet(s) Oral every 4 hours PRN Moderate Pain (4 - 6)        REVIEW OF SYSTEMS:  CONSTITUTIONAL: No fever, (+) weight loss, or fatigue  EYES: No eye pain, visual disturbances, or discharge  ENMT:  No difficulty hearing, tinnitus, vertigo; No sinus or throat pain  RESPIRATORY: No cough, wheezing, chills or hemoptysis; No shortness of breath  CARDIOVASCULAR: No chest pain, palpitations, dizziness, or leg swelling  GASTROINTESTINAL: No abdominal pain today, has only mild discomfort.  GENITOURINARY: No dysuria, frequency, hematuria, or incontinence  NEUROLOGICAL: No headaches, memory loss, loss of strength, numbness, or tremors  SKIN: No itching, burning, rashes, or lesions   MUSCULOSKELETAL: No joint pain or swelling; No muscle, back, or extremity pain  PSYCHIATRIC: No depression, anxiety, mood swings, or difficulty sleeping       Vital Signs Last 24 Hrs  T(C): 36.8 (20 Jun 2019 05:56), Max: 37.1 (19 Jun 2019 23:56)  T(F): 98.3 (20 Jun 2019 05:56), Max: 98.7 (19 Jun 2019 23:56)  HR: 92 (20 Jun 2019 05:56) (81 - 101)  BP: 143/86 (20 Jun 2019 05:56) (127/73 - 150/88)  BP(mean): --  RR: 18 (20 Jun 2019 05:56) (17 - 18)  SpO2: 100% (20 Jun 2019 05:56) (99% - 100%)      PHYSICAL EXAM:  GENERAL: NAD, thin  HEAD:  Atraumatic, Normocephalic  EYES: EOMI, PERRLA, conjunctiva and sclera clear  ENMT: No tonsillar erythema, exudates, or enlargement; Moist mucous membranes,   NECK: Supple, No JVD, Normal thyroid  NERVOUS SYSTEM:  Alert & Oriented X 3, Good concentration; Motor Strength 4/5 B/L upper and lower extremities;   CHEST/LUNG: Clear to percussion bilaterally; No rales, rhonchi, wheezing, or rubs  HEART: Regular rate and rhythm; No murmurs, rubs, or gallops  ABDOMEN: Soft, Nontender, Nondistended; Bowel sounds present, midline abdominal scar from previous surgery noted  EXTREMITIES:  2+ Peripheral Pulses, No clubbing, cyanosis; right arm edema, no tenderness.  LYMPH: No lymphadenopathy noted  SKIN: No rashes or lesions        LABS:                        9.2    6.62  )-----------( 193      ( 19 Jun 2019 11:36 )             28.8     06-19    147<H>  |  111<H>  |  15  ----------------------------<  94  3.2<L>   |  30  |  3.48<H>    Ca    7.1<L>      19 Jun 2019 11:36         cardiac markers     CAPILLARY BLOOD GLUCOSE      POCT Blood Glucose.: 80 mg/dL (20 Jun 2019 07:46)  POCT Blood Glucose.: 97 mg/dL (19 Jun 2019 22:28)  POCT Blood Glucose.: 83 mg/dL (19 Jun 2019 15:36)  POCT Blood Glucose.: 114 mg/dL (19 Jun 2019 10:49)    Cultures    RADIOLOGY & ADDITIONAL TESTS:    Imaging Personally Reviewed:  [ ] YES  [ ] NO    Consultant(s) Notes Reviewed:  [*] YES  [ ] NO    Care Discussed with Consultants/Other Providers [ ] YES  [ ] NO

## 2019-06-21 ENCOUNTER — TRANSCRIPTION ENCOUNTER (OUTPATIENT)
Age: 59
End: 2019-06-21

## 2019-06-21 VITALS
RESPIRATION RATE: 16 BRPM | SYSTOLIC BLOOD PRESSURE: 144 MMHG | TEMPERATURE: 98 F | DIASTOLIC BLOOD PRESSURE: 90 MMHG | HEART RATE: 90 BPM | OXYGEN SATURATION: 100 %

## 2019-06-21 DIAGNOSIS — E87.6 HYPOKALEMIA: ICD-10-CM

## 2019-06-21 LAB
% ALBUMIN: 25.8 % — SIGNIFICANT CHANGE UP
% ALPHA 1: 10.4 % — SIGNIFICANT CHANGE UP
% ALPHA 2: 8.7 % — SIGNIFICANT CHANGE UP
% BETA: 17.3 % — SIGNIFICANT CHANGE UP
% GAMMA: 37.8 % — SIGNIFICANT CHANGE UP
% M SPIKE: SIGNIFICANT CHANGE UP
ALBUMIN SERPL ELPH-MCNC: 1.5 G/DL — LOW (ref 3.6–5.5)
ALBUMIN/GLOB SERPL ELPH: 0.3 RATIO — SIGNIFICANT CHANGE UP
ALPHA1 GLOB SERPL ELPH-MCNC: 0.6 G/DL — HIGH (ref 0.1–0.4)
ALPHA2 GLOB SERPL ELPH-MCNC: 0.5 G/DL — SIGNIFICANT CHANGE UP (ref 0.5–1)
ANION GAP SERPL CALC-SCNC: 8 MMOL/L — SIGNIFICANT CHANGE UP (ref 5–17)
B-GLOBULIN SERPL ELPH-MCNC: 1 G/DL — SIGNIFICANT CHANGE UP (ref 0.5–1)
BUN SERPL-MCNC: 14 MG/DL — SIGNIFICANT CHANGE UP (ref 7–23)
C3 SERPL-MCNC: 61 MG/DL — LOW (ref 81–157)
CALCIUM SERPL-MCNC: 7.1 MG/DL — LOW (ref 8.5–10.1)
CHLORIDE SERPL-SCNC: 113 MMOL/L — HIGH (ref 96–108)
CO2 SERPL-SCNC: 27 MMOL/L — SIGNIFICANT CHANGE UP (ref 22–31)
CREAT SERPL-MCNC: 2.88 MG/DL — HIGH (ref 0.5–1.3)
GAMMA GLOBULIN: 2.2 G/DL — HIGH (ref 0.6–1.6)
GLUCOSE BLDC GLUCOMTR-MCNC: 88 MG/DL — SIGNIFICANT CHANGE UP (ref 70–99)
GLUCOSE BLDC GLUCOMTR-MCNC: 92 MG/DL — SIGNIFICANT CHANGE UP (ref 70–99)
GLUCOSE SERPL-MCNC: 120 MG/DL — HIGH (ref 70–99)
INTERPRETATION SERPL IFE-IMP: SIGNIFICANT CHANGE UP
M-SPIKE: SIGNIFICANT CHANGE UP (ref 0–0)
POTASSIUM SERPL-MCNC: 3.2 MMOL/L — LOW (ref 3.5–5.3)
POTASSIUM SERPL-SCNC: 3.2 MMOL/L — LOW (ref 3.5–5.3)
PROT ?TM UR-MCNC: 116 MG/DL — HIGH (ref 0–12)
PROT PATTERN SERPL ELPH-IMP: SIGNIFICANT CHANGE UP
SODIUM SERPL-SCNC: 148 MMOL/L — HIGH (ref 135–145)

## 2019-06-21 PROCEDURE — 99239 HOSP IP/OBS DSCHRG MGMT >30: CPT

## 2019-06-21 RX ORDER — OXYCODONE AND ACETAMINOPHEN 5; 325 MG/1; MG/1
2 TABLET ORAL EVERY 4 HOURS
Refills: 0 | Status: DISCONTINUED | OUTPATIENT
Start: 2019-06-21 | End: 2019-06-21

## 2019-06-21 RX ORDER — POTASSIUM CHLORIDE 20 MEQ
20 PACKET (EA) ORAL ONCE
Refills: 0 | Status: COMPLETED | OUTPATIENT
Start: 2019-06-21 | End: 2019-06-21

## 2019-06-21 RX ADMIN — HEPARIN SODIUM 5000 UNIT(S): 5000 INJECTION INTRAVENOUS; SUBCUTANEOUS at 05:46

## 2019-06-21 RX ADMIN — OXYCODONE AND ACETAMINOPHEN 2 TABLET(S): 5; 325 TABLET ORAL at 05:44

## 2019-06-21 RX ADMIN — Medication 1000 UNIT(S): at 13:22

## 2019-06-21 RX ADMIN — Medication 1 TABLET(S): at 13:24

## 2019-06-21 RX ADMIN — Medication 20 MILLIEQUIVALENT(S): at 14:07

## 2019-06-21 RX ADMIN — Medication 1334 MILLIGRAM(S): at 13:22

## 2019-06-21 RX ADMIN — CHLORHEXIDINE GLUCONATE 1 APPLICATION(S): 213 SOLUTION TOPICAL at 07:53

## 2019-06-21 RX ADMIN — PANTOPRAZOLE SODIUM 40 MILLIGRAM(S): 20 TABLET, DELAYED RELEASE ORAL at 05:46

## 2019-06-21 RX ADMIN — Medication 1334 MILLIGRAM(S): at 07:53

## 2019-06-21 RX ADMIN — OXYCODONE AND ACETAMINOPHEN 2 TABLET(S): 5; 325 TABLET ORAL at 06:36

## 2019-06-21 RX ADMIN — OXYCODONE AND ACETAMINOPHEN 2 TABLET(S): 5; 325 TABLET ORAL at 13:22

## 2019-06-21 RX ADMIN — Medication 1 MILLIGRAM(S): at 13:22

## 2019-06-21 RX ADMIN — Medication 100 MILLIGRAM(S): at 13:22

## 2019-06-21 RX ADMIN — AMLODIPINE BESYLATE 10 MILLIGRAM(S): 2.5 TABLET ORAL at 05:46

## 2019-06-21 NOTE — PROGRESS NOTE ADULT - PROBLEM SELECTOR PROBLEM 6
Alcohol-induced chronic pancreatitis
ETOH abuse
Preventive measure
ETOH abuse
Alcohol-induced chronic pancreatitis

## 2019-06-21 NOTE — PROGRESS NOTE ADULT - PROBLEM SELECTOR PLAN 7
deconditioning, will need inpatient rehab as per PT
not currently active, GI follow-up
not currently active, GI follow-up
deconditioning, will need inpatient rehab as per PT

## 2019-06-21 NOTE — PROGRESS NOTE ADULT - PROBLEM SELECTOR PROBLEM 5
ETOH abuse
Generalized weakness
Gout
ETOH abuse
Generalized weakness
ETOH abuse

## 2019-06-21 NOTE — PROGRESS NOTE ADULT - REASON FOR ADMISSION
Abdominal pain, vomiting

## 2019-06-21 NOTE — PROGRESS NOTE ADULT - PROBLEM SELECTOR PROBLEM 2
R/O Colonic mass
Bacteremia due to methicillin susceptible Staphylococcus aureus (MSSA)
Anemia
Bacteremia due to methicillin susceptible Staphylococcus aureus (MSSA)
R/O Colonic mass
ETOH abuse
ATN (acute tubular necrosis)
Bacteremia due to methicillin susceptible Staphylococcus aureus (MSSA)
ATN (acute tubular necrosis)

## 2019-06-21 NOTE — PROGRESS NOTE ADULT - PROBLEM SELECTOR PROBLEM 1
Abnormal CT scan, colon
Acute bacterial peritonitis
Acute bacterial peritonitis
Abnormal CT scan, colon
Abscess of abdominal cavity
Acute bacterial peritonitis
Perforated viscus
YUNIEL (acute kidney injury)
YUNIEL (acute kidney injury)
Perforated viscus
Perforated viscus
Acute bacterial peritonitis
Acute bacterial peritonitis
Perforated viscus
Perforated viscus

## 2019-06-21 NOTE — PROGRESS NOTE ADULT - PROBLEM SELECTOR PLAN 1
- with sepsis : POA  - retrogastric collection and/or lateral perihepatic/subcapsular collection drained by IR on 5/22 (250cc drained)  - OR & blood cultures grew MSSA, NGTD on repeat blood cx from 5/28  - nafcillin and flagyl switched to Ceftriaxone, due to worsening renal function  - TTE on 5/21 showed EF 60-65% w/ no evidence of valvular vegetation & ISAIAS on 6/4 was negative  - MRI of thoracolumbar spine was unremarkable & did not show diskitis /OM, Cervical spine MRI only showed chronic degenerative changes of C3-C6, with multilevel sac effacement   s/p removal of CINDY drain   no collection on CT scan  - Discharge planning to [facility] after dialysis today

## 2019-06-21 NOTE — PROGRESS NOTE ADULT - SUBJECTIVE AND OBJECTIVE BOX
Patient is a 58y old  Male who presents with a chief complaint of Abdominal pain, vomiting (19 Jun 2019 20:04), he was seen and examined, has no abdominal pain at present.       OVERNIGHT EVENTS: none    MEDICATIONS  (STANDING):  amLODIPine   Tablet 10 milliGRAM(s) Oral daily  calcium acetate 1334 milliGRAM(s) Oral three times a day with meals  chlorhexidine 4% Liquid 1 Application(s) Topical <User Schedule>  cholecalciferol 1000 Unit(s) Oral daily  folic acid 1 milliGRAM(s) Oral daily  heparin  Injectable 5000 Unit(s) SubCutaneous every 12 hours  multivitamin 1 Tablet(s) Oral daily  pantoprazole    Tablet 40 milliGRAM(s) Oral every 12 hours  potassium chloride   Powder 20 milliEquivalent(s) Oral once  sucralfate 1 Gram(s) Oral every 6 hours  thiamine 100 milliGRAM(s) Oral daily    MEDICATIONS  (PRN):  oxyCODONE    5 mG/acetaminophen 325 mG 2 Tablet(s) Oral every 4 hours PRN Moderate Pain (4 - 6)        Vital Signs Last 24 Hrs  T(C): 36.8 (21 Jun 2019 13:24), Max: 37.1 (21 Jun 2019 05:24)  T(F): 98.2 (21 Jun 2019 13:24), Max: 98.7 (21 Jun 2019 05:24)  HR: 90 (21 Jun 2019 13:24) (88 - 108)  BP: 144/90 (21 Jun 2019 13:24) (131/83 - 166/94)  BP(mean): --  RR: 16 (21 Jun 2019 13:24) (16 - 19)  SpO2: 100% (21 Jun 2019 13:24) (97% - 100%)      PHYSICAL EXAM:  GENERAL: NAD  HEAD:  Atraumatic, Normocephalic  EYES: EOMI, PERRLA, conjunctiva and sclera clear  ENMT: No tonsillar erythema, exudates, or enlargement; Moist mucous membranes,   NECK: Supple, No JVD, Normal thyroid  NERVOUS SYSTEM:  Alert & Oriented X 3, Good concentration; Motor Strength 4/5 B/L upper and lower extremities;   CHEST/LUNG: Clear to percussion bilaterally; No rales, rhonchi, wheezing, or rubs  HEART: Regular rate and rhythm; No murmurs, rubs, or gallops  ABDOMEN: Soft, Nontender, Nondistended; Bowel sounds present,   EXTREMITIES:  2+ Peripheral Pulses, No clubbing, cyanosis; right arm edema, no tenderness.  LYMPH: No lymphadenopathy noted  SKIN: No rashes or lesions        LABS:                06-21    148<H>  |  113<H>  |  14  ----------------------------<  120<H>  3.2<L>   |  27  |  2.88<H>    Ca    7.1<L>      21 Jun 2019 06:11    TPro  5.9<L>  /  Alb  1.5<L>  /  TBili  x   /  DBili  x   /  AST  x   /  ALT  x   /  AlkPhos  x   06-19            CAPILLARY BLOOD GLUCOSE      POCT Blood Glucose.: 80 mg/dL (20 Jun 2019 07:46)  POCT Blood Glucose.: 97 mg/dL (19 Jun 2019 22:28)  POCT Blood Glucose.: 83 mg/dL (19 Jun 2019 15:36)  POCT Blood Glucose.: 114 mg/dL (19 Jun 2019 10:49)    Cultures    RADIOLOGY & ADDITIONAL TESTS:    Imaging Personally Reviewed:  [ ] YES  [ ] NO    Consultant(s) Notes Reviewed:  [*] YES  [ ] NO    Care Discussed with Consultants/Other Providers [ ] YES  [ ] NO

## 2019-06-21 NOTE — PROGRESS NOTE ADULT - PROBLEM SELECTOR PROBLEM 4
R/O Colonic mass
Anemia due to blood loss
YUNIEL (acute kidney injury)
Hepatitis C
YUNIEL (acute kidney injury)
Anemia due to blood loss
YUNIEL (acute kidney injury)
R/O Colonic mass

## 2019-06-21 NOTE — PROGRESS NOTE ADULT - PROBLEM SELECTOR PROBLEM 7
Generalized weakness
Alcohol-induced chronic pancreatitis
Alcohol-induced chronic pancreatitis
Generalized weakness

## 2019-06-21 NOTE — PROGRESS NOTE ADULT - PROBLEM SELECTOR PLAN 3
improving   sec to above/Blast ? H/O consult
Post operative anemia on chronic anemia  - watch Hgb and transfuse if Hgb<7   - received 3 units on 5/16 & 1 unit on 6/2  1 unit on 6/17 in HD, check cbc stat today
- initially 2/2 ATN, now renal suspects Gentamicin nephrotoxicity, AIN, septic ATN  - renal following, started HD on 6/12, nephro feels will ultimately recover, but will need HD on a temporary basis, will not recover on this admission, so should go to rehab with HD and be followed closely by Dr. Dukes
still persistent leukocytosis  sec to above
still persistent leukocytosis  redose gent today   Will  need ISAIAS for persistent BSI   will do MRI spine to evaluate for Diskitis /OM of spine
worsened   sec to above/Blast ? H/O consult
- initially 2/2 ATN, now renal suspects Gentamicin nephrotoxicity, AIN, septic ATN  - renal following, started HD on 6/12, nephro feels will ultimately recover, but will need HD on a temporary basis, will not recover on this admission, so should go to rehab with HD and be followed closely by Dr. Dukes
improving   sec to above/Blast ? H/O consult
resolved
resolved  cont Rocephin (day 24 of 28)
still persistent leukocytosis  redose gent today   WILL need ISAIAS for persistent BSI   will do MRI spine to evaluate for Diskitis /OM of spine
still persistent leukocytosis  sec to above
today from 20-->23   but no fever  sec to above
Post operative anemia on chronic anemia  - watch Hgb and transfuse if Hgb<7   - received 3 units on 5/16 & 1 unit on 6/2  1 unit on 6/17 in HD, check cbc stat today

## 2019-06-21 NOTE — PROGRESS NOTE ADULT - PROBLEM SELECTOR PLAN 6
not currently active, GI follow-up
outpatient treatment if patient is willing
outpatient treatment if patient is willing
not currently active, GI follow-up

## 2019-06-21 NOTE — PROGRESS NOTE ADULT - PROBLEM SELECTOR PLAN 5
outpatient treatment if patient is willing
- deconditioning,   - [facility] as per PT
- deconditioning,   - [facility] as per PT
outpatient treatment if patient is willing

## 2019-06-21 NOTE — PROGRESS NOTE ADULT - PROBLEM SELECTOR PROBLEM 3
ATN (acute tubular necrosis)
Leukocytosis, unspecified type
ATN (acute tubular necrosis)
Alcohol-induced chronic pancreatitis
Leukocytosis, unspecified type
Anemia due to blood loss
Leukocytosis, unspecified type
Anemia due to blood loss

## 2019-06-21 NOTE — DISCHARGE NOTE NURSING/CASE MANAGEMENT/SOCIAL WORK - SOCIAL WORKER'S NAME
Kourtney Bruner-Pt. is being d/c to [facility] for [facility]. Pt. will travel to [facility] via [facility].

## 2019-06-21 NOTE — PROGRESS NOTE ADULT - PROVIDER SPECIALTY LIST ADULT
Cardiology
Critical Care
Gastroenterology
Hospitalist
Infectious Disease
Intervent Radiology
Nephrology
Surgery
Cardiology
Nephrology
Surgery
Hospitalist
Hospitalist
Infectious Disease
Gastroenterology
Infectious Disease
Hospitalist

## 2019-06-21 NOTE — PROGRESS NOTE ADULT - SUBJECTIVE AND OBJECTIVE BOX
Patient feels well no complaints today.    MEDICATIONS  (STANDING):  amLODIPine   Tablet 10 milliGRAM(s) Oral daily  calcium acetate 1334 milliGRAM(s) Oral three times a day with meals  chlorhexidine 4% Liquid 1 Application(s) Topical <User Schedule>  cholecalciferol 1000 Unit(s) Oral daily  folic acid 1 milliGRAM(s) Oral daily  heparin  Injectable 5000 Unit(s) SubCutaneous every 12 hours  multivitamin 1 Tablet(s) Oral daily  pantoprazole    Tablet 40 milliGRAM(s) Oral every 12 hours  sucralfate 1 Gram(s) Oral every 6 hours  thiamine 100 milliGRAM(s) Oral daily    MEDICATIONS  (PRN):  oxyCODONE    5 mG/acetaminophen 325 mG 2 Tablet(s) Oral every 4 hours PRN Moderate Pain (4 - 6)      06-21-19 @ 07:01  -  06-21-19 @ 13:13  --------------------------------------------------------  IN: 0 mL / OUT: 1000 mL / NET: -1000 mL      PHYSICAL EXAM:      T(C): 36.7 (06-21-19 @ 12:30), Max: 37.1 (06-21-19 @ 05:24)  HR: 99 (06-21-19 @ 12:30) (88 - 108)  BP: 140/80 (06-21-19 @ 12:30) (131/83 - 166/94)  RR: 16 (06-21-19 @ 12:30) (16 - 19)  SpO2: 100% (06-21-19 @ 12:30) (97% - 100%)  Wt(kg): --  Respiratory: clear anteriorly, decreased BS at bases  Cardiovascular: S1 S2  Gastrointestinal: soft NT ND +BS  Extremities:  tr edema                06-21    148<H>  |  113<H>  |  14  ----------------------------<  120<H>  3.2<L>   |  27  |  2.88<H>    Ca    7.1<L>corrected 9.4      21 Jun 2019 06:11    TPro  5.9<L>  /  Alb  x   /  TBili  x   /  DBili  x   /  AST  x   /  ALT  x   /  AlkPhos  x   06-19      LIVER FUNCTIONS - ( 19 Jun 2019 16:03 )  Alb: x     / Pro: 5.9 g/dL / ALK PHOS: x     / ALT: x     / AST: x     / GGT: x           Creatinine Trend: 2.88<--, 3.48<--, 4.79<--, 4.40<--, 4.05<--, 5.05<--  Assessment and Plan:    YUNIEL CKD; suspected infectious GN vs Abx related injury ( AIN/ ATN);   HD dependent for now but lower pre HD trend;   Replete K   Will follow at outpatient HD center next week; follow for recovery.

## 2019-06-21 NOTE — PROGRESS NOTE ADULT - PROBLEM SELECTOR PLAN 8
with malnutrition, and presumed folate and thiamine deficiency will add supplements
- with malnutrition, and presumed folate and thiamine deficiency.  - continue supplements
- with malnutrition, and presumed folate and thiamine deficiency.  - continue supplements
with malnutrition, and presumed folate and thiamine deficiency will add supplements

## 2019-06-21 NOTE — DISCHARGE NOTE NURSING/CASE MANAGEMENT/SOCIAL WORK - NSDCDPATPORTLINK_GEN_ALL_CORE
You can access the [facility] Patient Portal, offered by [facility], by registering with the following website: [email or web address]

## 2019-06-22 LAB
CULTURE RESULTS: SIGNIFICANT CHANGE UP
SPECIMEN SOURCE: SIGNIFICANT CHANGE UP

## 2019-06-24 DIAGNOSIS — E87.2 ACIDOSIS: ICD-10-CM

## 2019-06-24 DIAGNOSIS — A41.9 SEPSIS, UNSPECIFIED ORGANISM: ICD-10-CM

## 2019-06-24 DIAGNOSIS — K25.5 CHRONIC OR UNSPECIFIED GASTRIC ULCER WITH PERFORATION: ICD-10-CM

## 2019-06-24 DIAGNOSIS — R18.8 OTHER ASCITES: ICD-10-CM

## 2019-06-24 DIAGNOSIS — R65.21 SEVERE SEPSIS WITH SEPTIC SHOCK: ICD-10-CM

## 2019-06-24 DIAGNOSIS — E43 UNSPECIFIED SEVERE PROTEIN-CALORIE MALNUTRITION: ICD-10-CM

## 2019-06-24 DIAGNOSIS — K66.0 PERITONEAL ADHESIONS (POSTPROCEDURAL) (POSTINFECTION): ICD-10-CM

## 2019-06-24 DIAGNOSIS — A41.01 SEPSIS DUE TO METHICILLIN SUSCEPTIBLE STAPHYLOCOCCUS AUREUS: ICD-10-CM

## 2019-06-24 DIAGNOSIS — B19.20 UNSPECIFIED VIRAL HEPATITIS C WITHOUT HEPATIC COMA: ICD-10-CM

## 2019-06-24 DIAGNOSIS — R19.8 OTHER SPECIFIED SYMPTOMS AND SIGNS INVOLVING THE DIGESTIVE SYSTEM AND ABDOMEN: ICD-10-CM

## 2019-06-24 DIAGNOSIS — K65.0 GENERALIZED (ACUTE) PERITONITIS: ICD-10-CM

## 2019-06-24 DIAGNOSIS — E87.6 HYPOKALEMIA: ICD-10-CM

## 2019-06-24 DIAGNOSIS — K65.1 PERITONEAL ABSCESS: ICD-10-CM

## 2019-06-24 DIAGNOSIS — E55.9 VITAMIN D DEFICIENCY, UNSPECIFIED: ICD-10-CM

## 2019-06-24 DIAGNOSIS — E83.42 HYPOMAGNESEMIA: ICD-10-CM

## 2019-06-24 DIAGNOSIS — D72.829 ELEVATED WHITE BLOOD CELL COUNT, UNSPECIFIED: ICD-10-CM

## 2019-06-24 DIAGNOSIS — F10.10 ALCOHOL ABUSE, UNCOMPLICATED: ICD-10-CM

## 2019-06-24 DIAGNOSIS — K64.8 OTHER HEMORRHOIDS: ICD-10-CM

## 2019-06-24 DIAGNOSIS — N17.0 ACUTE KIDNEY FAILURE WITH TUBULAR NECROSIS: ICD-10-CM

## 2019-06-24 DIAGNOSIS — M50.31 OTHER CERVICAL DISC DEGENERATION, HIGH CERVICAL REGION: ICD-10-CM

## 2019-06-24 DIAGNOSIS — K86.0 ALCOHOL-INDUCED CHRONIC PANCREATITIS: ICD-10-CM

## 2019-06-24 DIAGNOSIS — D64.9 ANEMIA, UNSPECIFIED: ICD-10-CM

## 2019-06-24 DIAGNOSIS — E83.39 OTHER DISORDERS OF PHOSPHORUS METABOLISM: ICD-10-CM

## 2019-06-24 DIAGNOSIS — E51.9 THIAMINE DEFICIENCY, UNSPECIFIED: ICD-10-CM

## 2019-06-24 DIAGNOSIS — N18.9 CHRONIC KIDNEY DISEASE, UNSPECIFIED: ICD-10-CM

## 2019-06-24 DIAGNOSIS — D62 ACUTE POSTHEMORRHAGIC ANEMIA: ICD-10-CM

## 2019-06-24 DIAGNOSIS — E16.2 HYPOGLYCEMIA, UNSPECIFIED: ICD-10-CM

## 2019-06-24 DIAGNOSIS — M10.9 GOUT, UNSPECIFIED: ICD-10-CM

## 2019-06-24 LAB
CRYOGLOB SERPL-MCNC: NEGATIVE — SIGNIFICANT CHANGE UP
M PROTEIN 24H UR ELPH-MRATE: SIGNIFICANT CHANGE UP

## 2019-07-13 LAB
CULTURE RESULTS: SIGNIFICANT CHANGE UP
NIGHT BLUE STAIN TISS: SIGNIFICANT CHANGE UP
SPECIMEN SOURCE: SIGNIFICANT CHANGE UP

## 2019-11-01 NOTE — CHART NOTE - NSCHARTNOTESELECT_GEN_ALL_CORE
Functional deterioration in her functional capabilities.  Exercise and activity tolerance diminished.  As result of her acute, chronic and acute on chronic medical phenomena.  Patient with good goals.   Surgery

## 2020-01-22 ENCOUNTER — OUTPATIENT (OUTPATIENT)
Dept: OUTPATIENT SERVICES | Facility: HOSPITAL | Age: 60
LOS: 1 days | Discharge: HOME | End: 2020-01-22

## 2020-01-22 DIAGNOSIS — N18.5 CHRONIC KIDNEY DISEASE, STAGE 5: ICD-10-CM

## 2020-01-22 PROBLEM — B19.20 UNSPECIFIED VIRAL HEPATITIS C WITHOUT HEPATIC COMA: Chronic | Status: ACTIVE | Noted: 2019-05-16

## 2020-01-22 PROBLEM — M10.9 GOUT, UNSPECIFIED: Chronic | Status: ACTIVE | Noted: 2019-05-16

## 2020-01-22 LAB
ANION GAP SERPL CALC-SCNC: 9 MMOL/L — SIGNIFICANT CHANGE UP (ref 5–17)
BASOPHILS # BLD AUTO: 0.04 K/UL — SIGNIFICANT CHANGE UP (ref 0–0.2)
BASOPHILS NFR BLD AUTO: 0.6 % — SIGNIFICANT CHANGE UP (ref 0–2)
BUN SERPL-MCNC: 62 MG/DL — HIGH (ref 7–23)
CALCIUM SERPL-MCNC: 8.9 MG/DL — SIGNIFICANT CHANGE UP (ref 8.5–10.1)
CHLORIDE SERPL-SCNC: 111 MMOL/L — HIGH (ref 96–108)
CO2 SERPL-SCNC: 21 MMOL/L — LOW (ref 22–31)
CREAT SERPL-MCNC: 4.04 MG/DL — HIGH (ref 0.5–1.3)
EOSINOPHIL # BLD AUTO: 0 K/UL — SIGNIFICANT CHANGE UP (ref 0–0.5)
EOSINOPHIL NFR BLD AUTO: 0 % — SIGNIFICANT CHANGE UP (ref 0–6)
GLUCOSE SERPL-MCNC: 101 MG/DL — HIGH (ref 70–99)
HCT VFR BLD CALC: 33 % — LOW (ref 39–50)
HGB BLD-MCNC: 10.9 G/DL — LOW (ref 13–17)
IMM GRANULOCYTES NFR BLD AUTO: 0.3 % — SIGNIFICANT CHANGE UP (ref 0–1.5)
LYMPHOCYTES # BLD AUTO: 1.72 K/UL — SIGNIFICANT CHANGE UP (ref 1–3.3)
LYMPHOCYTES # BLD AUTO: 25.3 % — SIGNIFICANT CHANGE UP (ref 13–44)
MCHC RBC-ENTMCNC: 31.1 PG — SIGNIFICANT CHANGE UP (ref 27–34)
MCHC RBC-ENTMCNC: 33 GM/DL — SIGNIFICANT CHANGE UP (ref 32–36)
MCV RBC AUTO: 94 FL — SIGNIFICANT CHANGE UP (ref 80–100)
MONOCYTES # BLD AUTO: 0.5 K/UL — SIGNIFICANT CHANGE UP (ref 0–0.9)
MONOCYTES NFR BLD AUTO: 7.4 % — SIGNIFICANT CHANGE UP (ref 2–14)
NEUTROPHILS # BLD AUTO: 4.51 K/UL — SIGNIFICANT CHANGE UP (ref 1.8–7.4)
NEUTROPHILS NFR BLD AUTO: 66.4 % — SIGNIFICANT CHANGE UP (ref 43–77)
NRBC # BLD: 0 /100 WBCS — SIGNIFICANT CHANGE UP (ref 0–0)
PLATELET # BLD AUTO: 152 K/UL — SIGNIFICANT CHANGE UP (ref 150–400)
POTASSIUM SERPL-MCNC: 5.4 MMOL/L — HIGH (ref 3.5–5.3)
POTASSIUM SERPL-SCNC: 5.4 MMOL/L — HIGH (ref 3.5–5.3)
RBC # BLD: 3.51 M/UL — LOW (ref 4.2–5.8)
RBC # FLD: 13.3 % — SIGNIFICANT CHANGE UP (ref 10.3–14.5)
SODIUM SERPL-SCNC: 141 MMOL/L — SIGNIFICANT CHANGE UP (ref 135–145)
WBC # BLD: 6.79 K/UL — SIGNIFICANT CHANGE UP (ref 3.8–10.5)
WBC # FLD AUTO: 6.79 K/UL — SIGNIFICANT CHANGE UP (ref 3.8–10.5)

## 2020-01-24 ENCOUNTER — OUTPATIENT (OUTPATIENT)
Dept: OUTPATIENT SERVICES | Facility: HOSPITAL | Age: 60
LOS: 1 days | Discharge: HOME | End: 2020-01-24

## 2020-01-24 DIAGNOSIS — N18.9 CHRONIC KIDNEY DISEASE, UNSPECIFIED: ICD-10-CM

## 2020-01-24 LAB
ANION GAP SERPL CALC-SCNC: 8 MMOL/L — SIGNIFICANT CHANGE UP (ref 5–17)
BASOPHILS # BLD AUTO: 0.04 K/UL — SIGNIFICANT CHANGE UP (ref 0–0.2)
BASOPHILS NFR BLD AUTO: 0.6 % — SIGNIFICANT CHANGE UP (ref 0–2)
BUN SERPL-MCNC: 62 MG/DL — HIGH (ref 7–23)
CALCIUM SERPL-MCNC: 8.6 MG/DL — SIGNIFICANT CHANGE UP (ref 8.5–10.1)
CHLORIDE SERPL-SCNC: 113 MMOL/L — HIGH (ref 96–108)
CO2 SERPL-SCNC: 19 MMOL/L — LOW (ref 22–31)
CREAT SERPL-MCNC: 4.27 MG/DL — HIGH (ref 0.5–1.3)
EOSINOPHIL # BLD AUTO: 0 K/UL — SIGNIFICANT CHANGE UP (ref 0–0.5)
EOSINOPHIL NFR BLD AUTO: 0 % — SIGNIFICANT CHANGE UP (ref 0–6)
GLUCOSE SERPL-MCNC: 139 MG/DL — HIGH (ref 70–99)
HCT VFR BLD CALC: 33.5 % — LOW (ref 39–50)
HGB BLD-MCNC: 10.9 G/DL — LOW (ref 13–17)
IMM GRANULOCYTES NFR BLD AUTO: 0.3 % — SIGNIFICANT CHANGE UP (ref 0–1.5)
LYMPHOCYTES # BLD AUTO: 1.97 K/UL — SIGNIFICANT CHANGE UP (ref 1–3.3)
LYMPHOCYTES # BLD AUTO: 30.8 % — SIGNIFICANT CHANGE UP (ref 13–44)
MCHC RBC-ENTMCNC: 30.4 PG — SIGNIFICANT CHANGE UP (ref 27–34)
MCHC RBC-ENTMCNC: 32.5 GM/DL — SIGNIFICANT CHANGE UP (ref 32–36)
MCV RBC AUTO: 93.6 FL — SIGNIFICANT CHANGE UP (ref 80–100)
MONOCYTES # BLD AUTO: 0.52 K/UL — SIGNIFICANT CHANGE UP (ref 0–0.9)
MONOCYTES NFR BLD AUTO: 8.1 % — SIGNIFICANT CHANGE UP (ref 2–14)
NEUTROPHILS # BLD AUTO: 3.85 K/UL — SIGNIFICANT CHANGE UP (ref 1.8–7.4)
NEUTROPHILS NFR BLD AUTO: 60.2 % — SIGNIFICANT CHANGE UP (ref 43–77)
NRBC # BLD: 0 /100 WBCS — SIGNIFICANT CHANGE UP (ref 0–0)
PLATELET # BLD AUTO: 152 K/UL — SIGNIFICANT CHANGE UP (ref 150–400)
POTASSIUM SERPL-MCNC: 5.4 MMOL/L — HIGH (ref 3.5–5.3)
POTASSIUM SERPL-SCNC: 5.4 MMOL/L — HIGH (ref 3.5–5.3)
RBC # BLD: 3.58 M/UL — LOW (ref 4.2–5.8)
RBC # FLD: 13.6 % — SIGNIFICANT CHANGE UP (ref 10.3–14.5)
SODIUM SERPL-SCNC: 140 MMOL/L — SIGNIFICANT CHANGE UP (ref 135–145)
WBC # BLD: 6.4 K/UL — SIGNIFICANT CHANGE UP (ref 3.8–10.5)
WBC # FLD AUTO: 6.4 K/UL — SIGNIFICANT CHANGE UP (ref 3.8–10.5)

## 2020-01-27 ENCOUNTER — OUTPATIENT (OUTPATIENT)
Dept: OUTPATIENT SERVICES | Facility: HOSPITAL | Age: 60
LOS: 1 days | Discharge: HOME | End: 2020-01-27

## 2020-01-27 DIAGNOSIS — Z01.83 ENCOUNTER FOR BLOOD TYPING: ICD-10-CM

## 2020-01-27 LAB
ANION GAP SERPL CALC-SCNC: 7 MMOL/L — SIGNIFICANT CHANGE UP (ref 5–17)
BASOPHILS # BLD AUTO: 0.04 K/UL — SIGNIFICANT CHANGE UP (ref 0–0.2)
BASOPHILS NFR BLD AUTO: 0.7 % — SIGNIFICANT CHANGE UP (ref 0–2)
BUN SERPL-MCNC: 65 MG/DL — HIGH (ref 7–23)
CALCIUM SERPL-MCNC: 9.2 MG/DL — SIGNIFICANT CHANGE UP (ref 8.5–10.1)
CHLORIDE SERPL-SCNC: 108 MMOL/L — SIGNIFICANT CHANGE UP (ref 96–108)
CO2 SERPL-SCNC: 23 MMOL/L — SIGNIFICANT CHANGE UP (ref 22–31)
CREAT SERPL-MCNC: 4.53 MG/DL — HIGH (ref 0.5–1.3)
EOSINOPHIL # BLD AUTO: 0 K/UL — SIGNIFICANT CHANGE UP (ref 0–0.5)
EOSINOPHIL NFR BLD AUTO: 0 % — SIGNIFICANT CHANGE UP (ref 0–6)
GLUCOSE SERPL-MCNC: 92 MG/DL — SIGNIFICANT CHANGE UP (ref 70–99)
HCT VFR BLD CALC: 33.6 % — LOW (ref 39–50)
HGB BLD-MCNC: 11.1 G/DL — LOW (ref 13–17)
IMM GRANULOCYTES NFR BLD AUTO: 0.3 % — SIGNIFICANT CHANGE UP (ref 0–1.5)
LYMPHOCYTES # BLD AUTO: 2.37 K/UL — SIGNIFICANT CHANGE UP (ref 1–3.3)
LYMPHOCYTES # BLD AUTO: 38.5 % — SIGNIFICANT CHANGE UP (ref 13–44)
MCHC RBC-ENTMCNC: 30.5 PG — SIGNIFICANT CHANGE UP (ref 27–34)
MCHC RBC-ENTMCNC: 33 GM/DL — SIGNIFICANT CHANGE UP (ref 32–36)
MCV RBC AUTO: 92.3 FL — SIGNIFICANT CHANGE UP (ref 80–100)
MONOCYTES # BLD AUTO: 0.53 K/UL — SIGNIFICANT CHANGE UP (ref 0–0.9)
MONOCYTES NFR BLD AUTO: 8.6 % — SIGNIFICANT CHANGE UP (ref 2–14)
NEUTROPHILS # BLD AUTO: 3.19 K/UL — SIGNIFICANT CHANGE UP (ref 1.8–7.4)
NEUTROPHILS NFR BLD AUTO: 51.9 % — SIGNIFICANT CHANGE UP (ref 43–77)
NRBC # BLD: 0 /100 WBCS — SIGNIFICANT CHANGE UP (ref 0–0)
PLATELET # BLD AUTO: 162 K/UL — SIGNIFICANT CHANGE UP (ref 150–400)
POTASSIUM SERPL-MCNC: 4.5 MMOL/L — SIGNIFICANT CHANGE UP (ref 3.5–5.3)
POTASSIUM SERPL-SCNC: 4.5 MMOL/L — SIGNIFICANT CHANGE UP (ref 3.5–5.3)
RBC # BLD: 3.64 M/UL — LOW (ref 4.2–5.8)
RBC # FLD: 13.2 % — SIGNIFICANT CHANGE UP (ref 10.3–14.5)
SODIUM SERPL-SCNC: 138 MMOL/L — SIGNIFICANT CHANGE UP (ref 135–145)
WBC # BLD: 6.15 K/UL — SIGNIFICANT CHANGE UP (ref 3.8–10.5)
WBC # FLD AUTO: 6.15 K/UL — SIGNIFICANT CHANGE UP (ref 3.8–10.5)

## 2020-01-29 ENCOUNTER — OUTPATIENT (OUTPATIENT)
Dept: OUTPATIENT SERVICES | Facility: HOSPITAL | Age: 60
LOS: 1 days | Discharge: HOME | End: 2020-01-29

## 2020-01-29 DIAGNOSIS — N18.5 CHRONIC KIDNEY DISEASE, STAGE 5: ICD-10-CM

## 2020-01-29 LAB
ANION GAP SERPL CALC-SCNC: 8 MMOL/L — SIGNIFICANT CHANGE UP (ref 5–17)
BASOPHILS # BLD AUTO: 0.03 K/UL — SIGNIFICANT CHANGE UP (ref 0–0.2)
BASOPHILS NFR BLD AUTO: 0.6 % — SIGNIFICANT CHANGE UP (ref 0–2)
BUN SERPL-MCNC: 65 MG/DL — HIGH (ref 7–23)
CALCIUM SERPL-MCNC: 8.9 MG/DL — SIGNIFICANT CHANGE UP (ref 8.5–10.1)
CHLORIDE SERPL-SCNC: 110 MMOL/L — HIGH (ref 96–108)
CO2 SERPL-SCNC: 21 MMOL/L — LOW (ref 22–31)
CREAT SERPL-MCNC: 4.34 MG/DL — HIGH (ref 0.5–1.3)
EOSINOPHIL # BLD AUTO: 0.22 K/UL — SIGNIFICANT CHANGE UP (ref 0–0.5)
EOSINOPHIL NFR BLD AUTO: 4.1 % — SIGNIFICANT CHANGE UP (ref 0–6)
GLUCOSE SERPL-MCNC: 102 MG/DL — HIGH (ref 70–99)
HCT VFR BLD CALC: 32.8 % — LOW (ref 39–50)
HGB BLD-MCNC: 11 G/DL — LOW (ref 13–17)
IMM GRANULOCYTES NFR BLD AUTO: 0.4 % — SIGNIFICANT CHANGE UP (ref 0–1.5)
LYMPHOCYTES # BLD AUTO: 2.19 K/UL — SIGNIFICANT CHANGE UP (ref 1–3.3)
LYMPHOCYTES # BLD AUTO: 41 % — SIGNIFICANT CHANGE UP (ref 13–44)
MCHC RBC-ENTMCNC: 31.1 PG — SIGNIFICANT CHANGE UP (ref 27–34)
MCHC RBC-ENTMCNC: 33.5 GM/DL — SIGNIFICANT CHANGE UP (ref 32–36)
MCV RBC AUTO: 92.7 FL — SIGNIFICANT CHANGE UP (ref 80–100)
MONOCYTES # BLD AUTO: 0.42 K/UL — SIGNIFICANT CHANGE UP (ref 0–0.9)
MONOCYTES NFR BLD AUTO: 7.9 % — SIGNIFICANT CHANGE UP (ref 2–14)
NEUTROPHILS # BLD AUTO: 2.46 K/UL — SIGNIFICANT CHANGE UP (ref 1.8–7.4)
NEUTROPHILS NFR BLD AUTO: 46 % — SIGNIFICANT CHANGE UP (ref 43–77)
NRBC # BLD: 0 /100 WBCS — SIGNIFICANT CHANGE UP (ref 0–0)
PLATELET # BLD AUTO: 167 K/UL — SIGNIFICANT CHANGE UP (ref 150–400)
POTASSIUM SERPL-MCNC: 4.6 MMOL/L — SIGNIFICANT CHANGE UP (ref 3.5–5.3)
POTASSIUM SERPL-SCNC: 4.6 MMOL/L — SIGNIFICANT CHANGE UP (ref 3.5–5.3)
RBC # BLD: 3.54 M/UL — LOW (ref 4.2–5.8)
RBC # FLD: 13.2 % — SIGNIFICANT CHANGE UP (ref 10.3–14.5)
SODIUM SERPL-SCNC: 139 MMOL/L — SIGNIFICANT CHANGE UP (ref 135–145)
WBC # BLD: 5.34 K/UL — SIGNIFICANT CHANGE UP (ref 3.8–10.5)
WBC # FLD AUTO: 5.34 K/UL — SIGNIFICANT CHANGE UP (ref 3.8–10.5)

## 2020-01-31 ENCOUNTER — OUTPATIENT (OUTPATIENT)
Dept: OUTPATIENT SERVICES | Facility: HOSPITAL | Age: 60
LOS: 1 days | Discharge: HOME | End: 2020-01-31

## 2020-01-31 DIAGNOSIS — N18.5 CHRONIC KIDNEY DISEASE, STAGE 5: ICD-10-CM

## 2020-01-31 LAB
ANION GAP SERPL CALC-SCNC: 8 MMOL/L — SIGNIFICANT CHANGE UP (ref 5–17)
BASOPHILS # BLD AUTO: 0.05 K/UL — SIGNIFICANT CHANGE UP (ref 0–0.2)
BASOPHILS NFR BLD AUTO: 0.9 % — SIGNIFICANT CHANGE UP (ref 0–2)
BUN SERPL-MCNC: 62 MG/DL — HIGH (ref 7–23)
CALCIUM SERPL-MCNC: 8.7 MG/DL — SIGNIFICANT CHANGE UP (ref 8.5–10.1)
CHLORIDE SERPL-SCNC: 115 MMOL/L — HIGH (ref 96–108)
CO2 SERPL-SCNC: 19 MMOL/L — LOW (ref 22–31)
CREAT SERPL-MCNC: 4.14 MG/DL — HIGH (ref 0.5–1.3)
EOSINOPHIL # BLD AUTO: 0 K/UL — SIGNIFICANT CHANGE UP (ref 0–0.5)
EOSINOPHIL NFR BLD AUTO: 0 % — SIGNIFICANT CHANGE UP (ref 0–6)
GLUCOSE SERPL-MCNC: 102 MG/DL — HIGH (ref 70–99)
HCT VFR BLD CALC: 33 % — LOW (ref 39–50)
HGB BLD-MCNC: 10.9 G/DL — LOW (ref 13–17)
IMM GRANULOCYTES NFR BLD AUTO: 0.2 % — SIGNIFICANT CHANGE UP (ref 0–1.5)
LYMPHOCYTES # BLD AUTO: 2.25 K/UL — SIGNIFICANT CHANGE UP (ref 1–3.3)
LYMPHOCYTES # BLD AUTO: 42.4 % — SIGNIFICANT CHANGE UP (ref 13–44)
MCHC RBC-ENTMCNC: 30.9 PG — SIGNIFICANT CHANGE UP (ref 27–34)
MCHC RBC-ENTMCNC: 33 GM/DL — SIGNIFICANT CHANGE UP (ref 32–36)
MCV RBC AUTO: 93.5 FL — SIGNIFICANT CHANGE UP (ref 80–100)
MONOCYTES # BLD AUTO: 0.45 K/UL — SIGNIFICANT CHANGE UP (ref 0–0.9)
MONOCYTES NFR BLD AUTO: 8.5 % — SIGNIFICANT CHANGE UP (ref 2–14)
NEUTROPHILS # BLD AUTO: 2.55 K/UL — SIGNIFICANT CHANGE UP (ref 1.8–7.4)
NEUTROPHILS NFR BLD AUTO: 48 % — SIGNIFICANT CHANGE UP (ref 43–77)
NRBC # BLD: 0 /100 WBCS — SIGNIFICANT CHANGE UP (ref 0–0)
PLATELET # BLD AUTO: 168 K/UL — SIGNIFICANT CHANGE UP (ref 150–400)
POTASSIUM SERPL-MCNC: 5.1 MMOL/L — SIGNIFICANT CHANGE UP (ref 3.5–5.3)
POTASSIUM SERPL-SCNC: 5.1 MMOL/L — SIGNIFICANT CHANGE UP (ref 3.5–5.3)
RBC # BLD: 3.53 M/UL — LOW (ref 4.2–5.8)
RBC # FLD: 13.3 % — SIGNIFICANT CHANGE UP (ref 10.3–14.5)
SODIUM SERPL-SCNC: 142 MMOL/L — SIGNIFICANT CHANGE UP (ref 135–145)
WBC # BLD: 5.31 K/UL — SIGNIFICANT CHANGE UP (ref 3.8–10.5)
WBC # FLD AUTO: 5.31 K/UL — SIGNIFICANT CHANGE UP (ref 3.8–10.5)

## 2020-02-07 ENCOUNTER — OUTPATIENT (OUTPATIENT)
Dept: OUTPATIENT SERVICES | Facility: HOSPITAL | Age: 60
LOS: 1 days | Discharge: HOME | End: 2020-02-07

## 2020-02-07 DIAGNOSIS — N18.9 CHRONIC KIDNEY DISEASE, UNSPECIFIED: ICD-10-CM

## 2020-02-07 LAB
ANION GAP SERPL CALC-SCNC: 9 MMOL/L — SIGNIFICANT CHANGE UP (ref 5–17)
BASOPHILS # BLD AUTO: 0.04 K/UL — SIGNIFICANT CHANGE UP (ref 0–0.2)
BASOPHILS NFR BLD AUTO: 0.6 % — SIGNIFICANT CHANGE UP (ref 0–2)
BUN SERPL-MCNC: 61 MG/DL — HIGH (ref 7–23)
CALCIUM SERPL-MCNC: 8.6 MG/DL — SIGNIFICANT CHANGE UP (ref 8.5–10.1)
CHLORIDE SERPL-SCNC: 111 MMOL/L — HIGH (ref 96–108)
CO2 SERPL-SCNC: 22 MMOL/L — SIGNIFICANT CHANGE UP (ref 22–31)
CREAT SERPL-MCNC: 4.3 MG/DL — HIGH (ref 0.5–1.3)
EOSINOPHIL # BLD AUTO: 0 K/UL — SIGNIFICANT CHANGE UP (ref 0–0.5)
EOSINOPHIL NFR BLD AUTO: 0 % — SIGNIFICANT CHANGE UP (ref 0–6)
GLUCOSE SERPL-MCNC: 108 MG/DL — HIGH (ref 70–99)
HCT VFR BLD CALC: 33 % — LOW (ref 39–50)
HGB BLD-MCNC: 11 G/DL — LOW (ref 13–17)
IMM GRANULOCYTES NFR BLD AUTO: 0.5 % — SIGNIFICANT CHANGE UP (ref 0–1.5)
LYMPHOCYTES # BLD AUTO: 2.11 K/UL — SIGNIFICANT CHANGE UP (ref 1–3.3)
LYMPHOCYTES # BLD AUTO: 32.2 % — SIGNIFICANT CHANGE UP (ref 13–44)
MCHC RBC-ENTMCNC: 30.9 PG — SIGNIFICANT CHANGE UP (ref 27–34)
MCHC RBC-ENTMCNC: 33.3 GM/DL — SIGNIFICANT CHANGE UP (ref 32–36)
MCV RBC AUTO: 92.7 FL — SIGNIFICANT CHANGE UP (ref 80–100)
MONOCYTES # BLD AUTO: 0.59 K/UL — SIGNIFICANT CHANGE UP (ref 0–0.9)
MONOCYTES NFR BLD AUTO: 9 % — SIGNIFICANT CHANGE UP (ref 2–14)
NEUTROPHILS # BLD AUTO: 3.79 K/UL — SIGNIFICANT CHANGE UP (ref 1.8–7.4)
NEUTROPHILS NFR BLD AUTO: 57.7 % — SIGNIFICANT CHANGE UP (ref 43–77)
NRBC # BLD: 0 /100 WBCS — SIGNIFICANT CHANGE UP (ref 0–0)
PLATELET # BLD AUTO: 144 K/UL — LOW (ref 150–400)
POTASSIUM SERPL-MCNC: 4.7 MMOL/L — SIGNIFICANT CHANGE UP (ref 3.5–5.3)
POTASSIUM SERPL-SCNC: 4.7 MMOL/L — SIGNIFICANT CHANGE UP (ref 3.5–5.3)
RBC # BLD: 3.56 M/UL — LOW (ref 4.2–5.8)
RBC # FLD: 13.5 % — SIGNIFICANT CHANGE UP (ref 10.3–14.5)
SODIUM SERPL-SCNC: 142 MMOL/L — SIGNIFICANT CHANGE UP (ref 135–145)
WBC # BLD: 6.56 K/UL — SIGNIFICANT CHANGE UP (ref 3.8–10.5)
WBC # FLD AUTO: 6.56 K/UL — SIGNIFICANT CHANGE UP (ref 3.8–10.5)

## 2020-02-10 ENCOUNTER — OUTPATIENT (OUTPATIENT)
Dept: OUTPATIENT SERVICES | Facility: HOSPITAL | Age: 60
LOS: 1 days | Discharge: HOME | End: 2020-02-10

## 2020-02-10 DIAGNOSIS — N18.9 CHRONIC KIDNEY DISEASE, UNSPECIFIED: ICD-10-CM

## 2020-02-10 LAB
ANION GAP SERPL CALC-SCNC: 8 MMOL/L — SIGNIFICANT CHANGE UP (ref 5–17)
BASOPHILS # BLD AUTO: 0.03 K/UL — SIGNIFICANT CHANGE UP (ref 0–0.2)
BASOPHILS NFR BLD AUTO: 0.4 % — SIGNIFICANT CHANGE UP (ref 0–2)
BUN SERPL-MCNC: 57 MG/DL — HIGH (ref 7–23)
CALCIUM SERPL-MCNC: 9 MG/DL — SIGNIFICANT CHANGE UP (ref 8.5–10.1)
CHLORIDE SERPL-SCNC: 111 MMOL/L — HIGH (ref 96–108)
CO2 SERPL-SCNC: 23 MMOL/L — SIGNIFICANT CHANGE UP (ref 22–31)
CREAT SERPL-MCNC: 3.61 MG/DL — HIGH (ref 0.5–1.3)
EOSINOPHIL # BLD AUTO: 0 K/UL — SIGNIFICANT CHANGE UP (ref 0–0.5)
EOSINOPHIL NFR BLD AUTO: 0 % — SIGNIFICANT CHANGE UP (ref 0–6)
GLUCOSE SERPL-MCNC: 105 MG/DL — HIGH (ref 70–99)
HCT VFR BLD CALC: 34.2 % — LOW (ref 39–50)
HGB BLD-MCNC: 11.2 G/DL — LOW (ref 13–17)
IMM GRANULOCYTES NFR BLD AUTO: 0.9 % — SIGNIFICANT CHANGE UP (ref 0–1.5)
LYMPHOCYTES # BLD AUTO: 2.15 K/UL — SIGNIFICANT CHANGE UP (ref 1–3.3)
LYMPHOCYTES # BLD AUTO: 32.2 % — SIGNIFICANT CHANGE UP (ref 13–44)
MCHC RBC-ENTMCNC: 30.8 PG — SIGNIFICANT CHANGE UP (ref 27–34)
MCHC RBC-ENTMCNC: 32.7 GM/DL — SIGNIFICANT CHANGE UP (ref 32–36)
MCV RBC AUTO: 94 FL — SIGNIFICANT CHANGE UP (ref 80–100)
MONOCYTES # BLD AUTO: 0.56 K/UL — SIGNIFICANT CHANGE UP (ref 0–0.9)
MONOCYTES NFR BLD AUTO: 8.4 % — SIGNIFICANT CHANGE UP (ref 2–14)
NEUTROPHILS # BLD AUTO: 3.88 K/UL — SIGNIFICANT CHANGE UP (ref 1.8–7.4)
NEUTROPHILS NFR BLD AUTO: 58.1 % — SIGNIFICANT CHANGE UP (ref 43–77)
NRBC # BLD: 0 /100 WBCS — SIGNIFICANT CHANGE UP (ref 0–0)
PLATELET # BLD AUTO: 158 K/UL — SIGNIFICANT CHANGE UP (ref 150–400)
POTASSIUM SERPL-MCNC: 4.7 MMOL/L — SIGNIFICANT CHANGE UP (ref 3.5–5.3)
POTASSIUM SERPL-SCNC: 4.7 MMOL/L — SIGNIFICANT CHANGE UP (ref 3.5–5.3)
RBC # BLD: 3.64 M/UL — LOW (ref 4.2–5.8)
RBC # FLD: 13.7 % — SIGNIFICANT CHANGE UP (ref 10.3–14.5)
SODIUM SERPL-SCNC: 142 MMOL/L — SIGNIFICANT CHANGE UP (ref 135–145)
WBC # BLD: 6.68 K/UL — SIGNIFICANT CHANGE UP (ref 3.8–10.5)
WBC # FLD AUTO: 6.68 K/UL — SIGNIFICANT CHANGE UP (ref 3.8–10.5)

## 2020-02-14 ENCOUNTER — OUTPATIENT (OUTPATIENT)
Dept: OUTPATIENT SERVICES | Facility: HOSPITAL | Age: 60
LOS: 1 days | Discharge: HOME | End: 2020-02-14

## 2020-02-14 DIAGNOSIS — N18.5 CHRONIC KIDNEY DISEASE, STAGE 5: ICD-10-CM

## 2020-02-14 LAB
ANION GAP SERPL CALC-SCNC: 7 MMOL/L — SIGNIFICANT CHANGE UP (ref 5–17)
BASOPHILS # BLD AUTO: 0.04 K/UL — SIGNIFICANT CHANGE UP (ref 0–0.2)
BASOPHILS NFR BLD AUTO: 0.6 % — SIGNIFICANT CHANGE UP (ref 0–2)
BUN SERPL-MCNC: 71 MG/DL — HIGH (ref 7–23)
CALCIUM SERPL-MCNC: 8.8 MG/DL — SIGNIFICANT CHANGE UP (ref 8.5–10.1)
CHLORIDE SERPL-SCNC: 113 MMOL/L — HIGH (ref 96–108)
CO2 SERPL-SCNC: 20 MMOL/L — LOW (ref 22–31)
CREAT SERPL-MCNC: 4.21 MG/DL — HIGH (ref 0.5–1.3)
EOSINOPHIL # BLD AUTO: 0 K/UL — SIGNIFICANT CHANGE UP (ref 0–0.5)
EOSINOPHIL NFR BLD AUTO: 0 % — SIGNIFICANT CHANGE UP (ref 0–6)
GLUCOSE SERPL-MCNC: 94 MG/DL — SIGNIFICANT CHANGE UP (ref 70–99)
HCT VFR BLD CALC: 33 % — LOW (ref 39–50)
HGB BLD-MCNC: 11 G/DL — LOW (ref 13–17)
IMM GRANULOCYTES NFR BLD AUTO: 0.7 % — SIGNIFICANT CHANGE UP (ref 0–1.5)
LYMPHOCYTES # BLD AUTO: 2.19 K/UL — SIGNIFICANT CHANGE UP (ref 1–3.3)
LYMPHOCYTES # BLD AUTO: 30.7 % — SIGNIFICANT CHANGE UP (ref 13–44)
MCHC RBC-ENTMCNC: 30.9 PG — SIGNIFICANT CHANGE UP (ref 27–34)
MCHC RBC-ENTMCNC: 33.3 GM/DL — SIGNIFICANT CHANGE UP (ref 32–36)
MCV RBC AUTO: 92.7 FL — SIGNIFICANT CHANGE UP (ref 80–100)
MONOCYTES # BLD AUTO: 0.58 K/UL — SIGNIFICANT CHANGE UP (ref 0–0.9)
MONOCYTES NFR BLD AUTO: 8.1 % — SIGNIFICANT CHANGE UP (ref 2–14)
NEUTROPHILS # BLD AUTO: 4.28 K/UL — SIGNIFICANT CHANGE UP (ref 1.8–7.4)
NEUTROPHILS NFR BLD AUTO: 59.9 % — SIGNIFICANT CHANGE UP (ref 43–77)
NRBC # BLD: 0 /100 WBCS — SIGNIFICANT CHANGE UP (ref 0–0)
PLATELET # BLD AUTO: 173 K/UL — SIGNIFICANT CHANGE UP (ref 150–400)
POTASSIUM SERPL-MCNC: 5.1 MMOL/L — SIGNIFICANT CHANGE UP (ref 3.5–5.3)
POTASSIUM SERPL-SCNC: 5.1 MMOL/L — SIGNIFICANT CHANGE UP (ref 3.5–5.3)
RBC # BLD: 3.56 M/UL — LOW (ref 4.2–5.8)
RBC # FLD: 13.5 % — SIGNIFICANT CHANGE UP (ref 10.3–14.5)
SODIUM SERPL-SCNC: 140 MMOL/L — SIGNIFICANT CHANGE UP (ref 135–145)
WBC # BLD: 7.14 K/UL — SIGNIFICANT CHANGE UP (ref 3.8–10.5)
WBC # FLD AUTO: 7.14 K/UL — SIGNIFICANT CHANGE UP (ref 3.8–10.5)

## 2020-02-18 ENCOUNTER — OUTPATIENT (OUTPATIENT)
Dept: OUTPATIENT SERVICES | Facility: HOSPITAL | Age: 60
LOS: 1 days | Discharge: HOME | End: 2020-02-18

## 2020-02-18 DIAGNOSIS — N18.5 CHRONIC KIDNEY DISEASE, STAGE 5: ICD-10-CM

## 2020-02-18 LAB
ANION GAP SERPL CALC-SCNC: 9 MMOL/L — SIGNIFICANT CHANGE UP (ref 5–17)
BASOPHILS # BLD AUTO: 0.04 K/UL — SIGNIFICANT CHANGE UP (ref 0–0.2)
BASOPHILS NFR BLD AUTO: 0.6 % — SIGNIFICANT CHANGE UP (ref 0–2)
BUN SERPL-MCNC: 65 MG/DL — HIGH (ref 7–23)
CALCIUM SERPL-MCNC: 8.9 MG/DL — SIGNIFICANT CHANGE UP (ref 8.5–10.1)
CHLORIDE SERPL-SCNC: 113 MMOL/L — HIGH (ref 96–108)
CO2 SERPL-SCNC: 19 MMOL/L — LOW (ref 22–31)
CREAT SERPL-MCNC: 3.8 MG/DL — HIGH (ref 0.5–1.3)
EOSINOPHIL # BLD AUTO: 0 K/UL — SIGNIFICANT CHANGE UP (ref 0–0.5)
EOSINOPHIL NFR BLD AUTO: 0 % — SIGNIFICANT CHANGE UP (ref 0–6)
GLUCOSE SERPL-MCNC: 104 MG/DL — HIGH (ref 70–99)
HCT VFR BLD CALC: 33 % — LOW (ref 39–50)
HGB BLD-MCNC: 11 G/DL — LOW (ref 13–17)
IMM GRANULOCYTES NFR BLD AUTO: 0.5 % — SIGNIFICANT CHANGE UP (ref 0–1.5)
LYMPHOCYTES # BLD AUTO: 1.79 K/UL — SIGNIFICANT CHANGE UP (ref 1–3.3)
LYMPHOCYTES # BLD AUTO: 28.6 % — SIGNIFICANT CHANGE UP (ref 13–44)
MCHC RBC-ENTMCNC: 30.6 PG — SIGNIFICANT CHANGE UP (ref 27–34)
MCHC RBC-ENTMCNC: 33.3 GM/DL — SIGNIFICANT CHANGE UP (ref 32–36)
MCV RBC AUTO: 91.7 FL — SIGNIFICANT CHANGE UP (ref 80–100)
MONOCYTES # BLD AUTO: 0.5 K/UL — SIGNIFICANT CHANGE UP (ref 0–0.9)
MONOCYTES NFR BLD AUTO: 8 % — SIGNIFICANT CHANGE UP (ref 2–14)
NEUTROPHILS # BLD AUTO: 3.89 K/UL — SIGNIFICANT CHANGE UP (ref 1.8–7.4)
NEUTROPHILS NFR BLD AUTO: 62.3 % — SIGNIFICANT CHANGE UP (ref 43–77)
NRBC # BLD: 0 /100 WBCS — SIGNIFICANT CHANGE UP (ref 0–0)
PLATELET # BLD AUTO: 172 K/UL — SIGNIFICANT CHANGE UP (ref 150–400)
POTASSIUM SERPL-MCNC: 4.9 MMOL/L — SIGNIFICANT CHANGE UP (ref 3.5–5.3)
POTASSIUM SERPL-SCNC: 4.9 MMOL/L — SIGNIFICANT CHANGE UP (ref 3.5–5.3)
RBC # BLD: 3.6 M/UL — LOW (ref 4.2–5.8)
RBC # FLD: 13.5 % — SIGNIFICANT CHANGE UP (ref 10.3–14.5)
SODIUM SERPL-SCNC: 141 MMOL/L — SIGNIFICANT CHANGE UP (ref 135–145)
WBC # BLD: 6.25 K/UL — SIGNIFICANT CHANGE UP (ref 3.8–10.5)
WBC # FLD AUTO: 6.25 K/UL — SIGNIFICANT CHANGE UP (ref 3.8–10.5)

## 2020-02-21 ENCOUNTER — OUTPATIENT (OUTPATIENT)
Dept: OUTPATIENT SERVICES | Facility: HOSPITAL | Age: 60
LOS: 1 days | Discharge: HOME | End: 2020-02-21

## 2020-02-21 DIAGNOSIS — N18.5 CHRONIC KIDNEY DISEASE, STAGE 5: ICD-10-CM

## 2020-02-21 LAB
ANION GAP SERPL CALC-SCNC: 7 MMOL/L — SIGNIFICANT CHANGE UP (ref 5–17)
BASOPHILS # BLD AUTO: 0.05 K/UL — SIGNIFICANT CHANGE UP (ref 0–0.2)
BASOPHILS NFR BLD AUTO: 0.8 % — SIGNIFICANT CHANGE UP (ref 0–2)
BUN SERPL-MCNC: 68 MG/DL — HIGH (ref 7–23)
CALCIUM SERPL-MCNC: 8.6 MG/DL — SIGNIFICANT CHANGE UP (ref 8.5–10.1)
CHLORIDE SERPL-SCNC: 116 MMOL/L — HIGH (ref 96–108)
CO2 SERPL-SCNC: 19 MMOL/L — LOW (ref 22–31)
CREAT SERPL-MCNC: 4.08 MG/DL — HIGH (ref 0.5–1.3)
EOSINOPHIL # BLD AUTO: 0 K/UL — SIGNIFICANT CHANGE UP (ref 0–0.5)
EOSINOPHIL NFR BLD AUTO: 0 % — SIGNIFICANT CHANGE UP (ref 0–6)
GLUCOSE SERPL-MCNC: 103 MG/DL — HIGH (ref 70–99)
HCT VFR BLD CALC: 34.3 % — LOW (ref 39–50)
HGB BLD-MCNC: 11.3 G/DL — LOW (ref 13–17)
IMM GRANULOCYTES NFR BLD AUTO: 0.3 % — SIGNIFICANT CHANGE UP (ref 0–1.5)
LYMPHOCYTES # BLD AUTO: 2.19 K/UL — SIGNIFICANT CHANGE UP (ref 1–3.3)
LYMPHOCYTES # BLD AUTO: 34 % — SIGNIFICANT CHANGE UP (ref 13–44)
MCHC RBC-ENTMCNC: 30.7 PG — SIGNIFICANT CHANGE UP (ref 27–34)
MCHC RBC-ENTMCNC: 32.9 GM/DL — SIGNIFICANT CHANGE UP (ref 32–36)
MCV RBC AUTO: 93.2 FL — SIGNIFICANT CHANGE UP (ref 80–100)
MONOCYTES # BLD AUTO: 0.49 K/UL — SIGNIFICANT CHANGE UP (ref 0–0.9)
MONOCYTES NFR BLD AUTO: 7.6 % — SIGNIFICANT CHANGE UP (ref 2–14)
NEUTROPHILS # BLD AUTO: 3.69 K/UL — SIGNIFICANT CHANGE UP (ref 1.8–7.4)
NEUTROPHILS NFR BLD AUTO: 57.3 % — SIGNIFICANT CHANGE UP (ref 43–77)
NRBC # BLD: 0 /100 WBCS — SIGNIFICANT CHANGE UP (ref 0–0)
PLATELET # BLD AUTO: 169 K/UL — SIGNIFICANT CHANGE UP (ref 150–400)
POTASSIUM SERPL-MCNC: 4.9 MMOL/L — SIGNIFICANT CHANGE UP (ref 3.5–5.3)
POTASSIUM SERPL-SCNC: 4.9 MMOL/L — SIGNIFICANT CHANGE UP (ref 3.5–5.3)
RBC # BLD: 3.68 M/UL — LOW (ref 4.2–5.8)
RBC # FLD: 13.8 % — SIGNIFICANT CHANGE UP (ref 10.3–14.5)
SODIUM SERPL-SCNC: 142 MMOL/L — SIGNIFICANT CHANGE UP (ref 135–145)
WBC # BLD: 6.44 K/UL — SIGNIFICANT CHANGE UP (ref 3.8–10.5)
WBC # FLD AUTO: 6.44 K/UL — SIGNIFICANT CHANGE UP (ref 3.8–10.5)

## 2020-02-24 ENCOUNTER — OUTPATIENT (OUTPATIENT)
Dept: OUTPATIENT SERVICES | Facility: HOSPITAL | Age: 60
LOS: 1 days | Discharge: HOME | End: 2020-02-24

## 2020-02-24 DIAGNOSIS — N18.5 CHRONIC KIDNEY DISEASE, STAGE 5: ICD-10-CM

## 2020-02-24 LAB
ANION GAP SERPL CALC-SCNC: 8 MMOL/L — SIGNIFICANT CHANGE UP (ref 5–17)
BASOPHILS # BLD AUTO: 0.04 K/UL — SIGNIFICANT CHANGE UP (ref 0–0.2)
BASOPHILS NFR BLD AUTO: 0.7 % — SIGNIFICANT CHANGE UP (ref 0–2)
BUN SERPL-MCNC: 69 MG/DL — HIGH (ref 7–23)
CALCIUM SERPL-MCNC: 8.9 MG/DL — SIGNIFICANT CHANGE UP (ref 8.5–10.1)
CHLORIDE SERPL-SCNC: 115 MMOL/L — HIGH (ref 96–108)
CO2 SERPL-SCNC: 18 MMOL/L — LOW (ref 22–31)
CREAT SERPL-MCNC: 4.18 MG/DL — HIGH (ref 0.5–1.3)
EOSINOPHIL # BLD AUTO: 0 K/UL — SIGNIFICANT CHANGE UP (ref 0–0.5)
EOSINOPHIL NFR BLD AUTO: 0 % — SIGNIFICANT CHANGE UP (ref 0–6)
GLUCOSE SERPL-MCNC: 108 MG/DL — HIGH (ref 70–99)
HCT VFR BLD CALC: 33.9 % — LOW (ref 39–50)
HGB BLD-MCNC: 11.3 G/DL — LOW (ref 13–17)
IMM GRANULOCYTES NFR BLD AUTO: 0.3 % — SIGNIFICANT CHANGE UP (ref 0–1.5)
LYMPHOCYTES # BLD AUTO: 1.79 K/UL — SIGNIFICANT CHANGE UP (ref 1–3.3)
LYMPHOCYTES # BLD AUTO: 30.2 % — SIGNIFICANT CHANGE UP (ref 13–44)
MCHC RBC-ENTMCNC: 30.7 PG — SIGNIFICANT CHANGE UP (ref 27–34)
MCHC RBC-ENTMCNC: 33.3 GM/DL — SIGNIFICANT CHANGE UP (ref 32–36)
MCV RBC AUTO: 92.1 FL — SIGNIFICANT CHANGE UP (ref 80–100)
MONOCYTES # BLD AUTO: 0.42 K/UL — SIGNIFICANT CHANGE UP (ref 0–0.9)
MONOCYTES NFR BLD AUTO: 7.1 % — SIGNIFICANT CHANGE UP (ref 2–14)
NEUTROPHILS # BLD AUTO: 3.66 K/UL — SIGNIFICANT CHANGE UP (ref 1.8–7.4)
NEUTROPHILS NFR BLD AUTO: 61.7 % — SIGNIFICANT CHANGE UP (ref 43–77)
NRBC # BLD: 0 /100 WBCS — SIGNIFICANT CHANGE UP (ref 0–0)
PLATELET # BLD AUTO: 173 K/UL — SIGNIFICANT CHANGE UP (ref 150–400)
POTASSIUM SERPL-MCNC: 4.8 MMOL/L — SIGNIFICANT CHANGE UP (ref 3.5–5.3)
POTASSIUM SERPL-SCNC: 4.8 MMOL/L — SIGNIFICANT CHANGE UP (ref 3.5–5.3)
RBC # BLD: 3.68 M/UL — LOW (ref 4.2–5.8)
RBC # FLD: 13.6 % — SIGNIFICANT CHANGE UP (ref 10.3–14.5)
SODIUM SERPL-SCNC: 141 MMOL/L — SIGNIFICANT CHANGE UP (ref 135–145)
WBC # BLD: 5.93 K/UL — SIGNIFICANT CHANGE UP (ref 3.8–10.5)
WBC # FLD AUTO: 5.93 K/UL — SIGNIFICANT CHANGE UP (ref 3.8–10.5)

## 2020-02-26 ENCOUNTER — OUTPATIENT (OUTPATIENT)
Dept: OUTPATIENT SERVICES | Facility: HOSPITAL | Age: 60
LOS: 1 days | Discharge: HOME | End: 2020-02-26

## 2020-02-26 DIAGNOSIS — N18.5 CHRONIC KIDNEY DISEASE, STAGE 5: ICD-10-CM

## 2020-02-26 LAB
ANION GAP SERPL CALC-SCNC: 8 MMOL/L — SIGNIFICANT CHANGE UP (ref 5–17)
BASOPHILS # BLD AUTO: 0.05 K/UL — SIGNIFICANT CHANGE UP (ref 0–0.2)
BASOPHILS NFR BLD AUTO: 0.9 % — SIGNIFICANT CHANGE UP (ref 0–2)
BUN SERPL-MCNC: 64 MG/DL — HIGH (ref 7–23)
CALCIUM SERPL-MCNC: 8.6 MG/DL — SIGNIFICANT CHANGE UP (ref 8.5–10.1)
CHLORIDE SERPL-SCNC: 115 MMOL/L — HIGH (ref 96–108)
CO2 SERPL-SCNC: 20 MMOL/L — LOW (ref 22–31)
CREAT SERPL-MCNC: 4.12 MG/DL — HIGH (ref 0.5–1.3)
EOSINOPHIL # BLD AUTO: 0 K/UL — SIGNIFICANT CHANGE UP (ref 0–0.5)
EOSINOPHIL NFR BLD AUTO: 0 % — SIGNIFICANT CHANGE UP (ref 0–6)
GLUCOSE SERPL-MCNC: 96 MG/DL — SIGNIFICANT CHANGE UP (ref 70–99)
HCT VFR BLD CALC: 33.7 % — LOW (ref 39–50)
HGB BLD-MCNC: 11.2 G/DL — LOW (ref 13–17)
IMM GRANULOCYTES NFR BLD AUTO: 0.2 % — SIGNIFICANT CHANGE UP (ref 0–1.5)
LYMPHOCYTES # BLD AUTO: 2.29 K/UL — SIGNIFICANT CHANGE UP (ref 1–3.3)
LYMPHOCYTES # BLD AUTO: 41.9 % — SIGNIFICANT CHANGE UP (ref 13–44)
MCHC RBC-ENTMCNC: 30.7 PG — SIGNIFICANT CHANGE UP (ref 27–34)
MCHC RBC-ENTMCNC: 33.2 GM/DL — SIGNIFICANT CHANGE UP (ref 32–36)
MCV RBC AUTO: 92.3 FL — SIGNIFICANT CHANGE UP (ref 80–100)
MONOCYTES # BLD AUTO: 0.46 K/UL — SIGNIFICANT CHANGE UP (ref 0–0.9)
MONOCYTES NFR BLD AUTO: 8.4 % — SIGNIFICANT CHANGE UP (ref 2–14)
NEUTROPHILS # BLD AUTO: 2.66 K/UL — SIGNIFICANT CHANGE UP (ref 1.8–7.4)
NEUTROPHILS NFR BLD AUTO: 48.6 % — SIGNIFICANT CHANGE UP (ref 43–77)
NRBC # BLD: 0 /100 WBCS — SIGNIFICANT CHANGE UP (ref 0–0)
PLATELET # BLD AUTO: 168 K/UL — SIGNIFICANT CHANGE UP (ref 150–400)
POTASSIUM SERPL-MCNC: 4.7 MMOL/L — SIGNIFICANT CHANGE UP (ref 3.5–5.3)
POTASSIUM SERPL-SCNC: 4.7 MMOL/L — SIGNIFICANT CHANGE UP (ref 3.5–5.3)
RBC # BLD: 3.65 M/UL — LOW (ref 4.2–5.8)
RBC # FLD: 13.7 % — SIGNIFICANT CHANGE UP (ref 10.3–14.5)
SODIUM SERPL-SCNC: 143 MMOL/L — SIGNIFICANT CHANGE UP (ref 135–145)
WBC # BLD: 5.47 K/UL — SIGNIFICANT CHANGE UP (ref 3.8–10.5)
WBC # FLD AUTO: 5.47 K/UL — SIGNIFICANT CHANGE UP (ref 3.8–10.5)

## 2020-02-28 ENCOUNTER — OUTPATIENT (OUTPATIENT)
Dept: OUTPATIENT SERVICES | Facility: HOSPITAL | Age: 60
LOS: 1 days | Discharge: HOME | End: 2020-02-28

## 2020-02-28 DIAGNOSIS — N18.5 CHRONIC KIDNEY DISEASE, STAGE 5: ICD-10-CM

## 2020-02-28 LAB
ANION GAP SERPL CALC-SCNC: 6 MMOL/L — SIGNIFICANT CHANGE UP (ref 5–17)
BASOPHILS # BLD AUTO: 0.05 K/UL — SIGNIFICANT CHANGE UP (ref 0–0.2)
BASOPHILS NFR BLD AUTO: 0.8 % — SIGNIFICANT CHANGE UP (ref 0–2)
BUN SERPL-MCNC: 65 MG/DL — HIGH (ref 7–23)
CALCIUM SERPL-MCNC: 9.1 MG/DL — SIGNIFICANT CHANGE UP (ref 8.5–10.1)
CHLORIDE SERPL-SCNC: 113 MMOL/L — HIGH (ref 96–108)
CO2 SERPL-SCNC: 20 MMOL/L — LOW (ref 22–31)
CREAT SERPL-MCNC: 4.13 MG/DL — HIGH (ref 0.5–1.3)
EOSINOPHIL # BLD AUTO: 0 K/UL — SIGNIFICANT CHANGE UP (ref 0–0.5)
EOSINOPHIL NFR BLD AUTO: 0 % — SIGNIFICANT CHANGE UP (ref 0–6)
GLUCOSE SERPL-MCNC: 86 MG/DL — SIGNIFICANT CHANGE UP (ref 70–99)
HCT VFR BLD CALC: 34.6 % — LOW (ref 39–50)
HGB BLD-MCNC: 11.5 G/DL — LOW (ref 13–17)
IMM GRANULOCYTES NFR BLD AUTO: 0.3 % — SIGNIFICANT CHANGE UP (ref 0–1.5)
LYMPHOCYTES # BLD AUTO: 2.23 K/UL — SIGNIFICANT CHANGE UP (ref 1–3.3)
LYMPHOCYTES # BLD AUTO: 36.7 % — SIGNIFICANT CHANGE UP (ref 13–44)
MCHC RBC-ENTMCNC: 31.2 PG — SIGNIFICANT CHANGE UP (ref 27–34)
MCHC RBC-ENTMCNC: 33.2 GM/DL — SIGNIFICANT CHANGE UP (ref 32–36)
MCV RBC AUTO: 93.8 FL — SIGNIFICANT CHANGE UP (ref 80–100)
MONOCYTES # BLD AUTO: 0.49 K/UL — SIGNIFICANT CHANGE UP (ref 0–0.9)
MONOCYTES NFR BLD AUTO: 8.1 % — SIGNIFICANT CHANGE UP (ref 2–14)
NEUTROPHILS # BLD AUTO: 3.29 K/UL — SIGNIFICANT CHANGE UP (ref 1.8–7.4)
NEUTROPHILS NFR BLD AUTO: 54.1 % — SIGNIFICANT CHANGE UP (ref 43–77)
NRBC # BLD: 0 /100 WBCS — SIGNIFICANT CHANGE UP (ref 0–0)
PLATELET # BLD AUTO: 163 K/UL — SIGNIFICANT CHANGE UP (ref 150–400)
POTASSIUM SERPL-MCNC: 4.7 MMOL/L — SIGNIFICANT CHANGE UP (ref 3.5–5.3)
POTASSIUM SERPL-SCNC: 4.7 MMOL/L — SIGNIFICANT CHANGE UP (ref 3.5–5.3)
RBC # BLD: 3.69 M/UL — LOW (ref 4.2–5.8)
RBC # FLD: 14 % — SIGNIFICANT CHANGE UP (ref 10.3–14.5)
SODIUM SERPL-SCNC: 139 MMOL/L — SIGNIFICANT CHANGE UP (ref 135–145)
WBC # BLD: 6.08 K/UL — SIGNIFICANT CHANGE UP (ref 3.8–10.5)
WBC # FLD AUTO: 6.08 K/UL — SIGNIFICANT CHANGE UP (ref 3.8–10.5)

## 2020-03-04 ENCOUNTER — OUTPATIENT (OUTPATIENT)
Dept: OUTPATIENT SERVICES | Facility: HOSPITAL | Age: 60
LOS: 1 days | Discharge: HOME | End: 2020-03-04

## 2020-03-04 DIAGNOSIS — N18.6 END STAGE RENAL DISEASE: ICD-10-CM

## 2020-03-04 LAB
ANION GAP SERPL CALC-SCNC: 6 MMOL/L — SIGNIFICANT CHANGE UP (ref 5–17)
BASOPHILS # BLD AUTO: 0.05 K/UL — SIGNIFICANT CHANGE UP (ref 0–0.2)
BASOPHILS NFR BLD AUTO: 0.9 % — SIGNIFICANT CHANGE UP (ref 0–2)
BUN SERPL-MCNC: 68 MG/DL — HIGH (ref 7–23)
CALCIUM SERPL-MCNC: 8.8 MG/DL — SIGNIFICANT CHANGE UP (ref 8.5–10.1)
CHLORIDE SERPL-SCNC: 113 MMOL/L — HIGH (ref 96–108)
CO2 SERPL-SCNC: 19 MMOL/L — LOW (ref 22–31)
CREAT SERPL-MCNC: 4.66 MG/DL — HIGH (ref 0.5–1.3)
EOSINOPHIL # BLD AUTO: 0.23 K/UL — SIGNIFICANT CHANGE UP (ref 0–0.5)
EOSINOPHIL NFR BLD AUTO: 4.2 % — SIGNIFICANT CHANGE UP (ref 0–6)
GLUCOSE SERPL-MCNC: 100 MG/DL — HIGH (ref 70–99)
HCT VFR BLD CALC: 33.9 % — LOW (ref 39–50)
HGB BLD-MCNC: 11.2 G/DL — LOW (ref 13–17)
IMM GRANULOCYTES NFR BLD AUTO: 0.4 % — SIGNIFICANT CHANGE UP (ref 0–1.5)
LYMPHOCYTES # BLD AUTO: 2.21 K/UL — SIGNIFICANT CHANGE UP (ref 1–3.3)
LYMPHOCYTES # BLD AUTO: 40 % — SIGNIFICANT CHANGE UP (ref 13–44)
MCHC RBC-ENTMCNC: 31 PG — SIGNIFICANT CHANGE UP (ref 27–34)
MCHC RBC-ENTMCNC: 33 GM/DL — SIGNIFICANT CHANGE UP (ref 32–36)
MCV RBC AUTO: 93.9 FL — SIGNIFICANT CHANGE UP (ref 80–100)
MONOCYTES # BLD AUTO: 0.51 K/UL — SIGNIFICANT CHANGE UP (ref 0–0.9)
MONOCYTES NFR BLD AUTO: 9.2 % — SIGNIFICANT CHANGE UP (ref 2–14)
NEUTROPHILS # BLD AUTO: 2.51 K/UL — SIGNIFICANT CHANGE UP (ref 1.8–7.4)
NEUTROPHILS NFR BLD AUTO: 45.3 % — SIGNIFICANT CHANGE UP (ref 43–77)
NRBC # BLD: 0 /100 WBCS — SIGNIFICANT CHANGE UP (ref 0–0)
PLATELET # BLD AUTO: 133 K/UL — LOW (ref 150–400)
POTASSIUM SERPL-MCNC: 5 MMOL/L — SIGNIFICANT CHANGE UP (ref 3.5–5.3)
POTASSIUM SERPL-SCNC: 5 MMOL/L — SIGNIFICANT CHANGE UP (ref 3.5–5.3)
RBC # BLD: 3.61 M/UL — LOW (ref 4.2–5.8)
RBC # FLD: 14 % — SIGNIFICANT CHANGE UP (ref 10.3–14.5)
SODIUM SERPL-SCNC: 138 MMOL/L — SIGNIFICANT CHANGE UP (ref 135–145)
WBC # BLD: 5.53 K/UL — SIGNIFICANT CHANGE UP (ref 3.8–10.5)
WBC # FLD AUTO: 5.53 K/UL — SIGNIFICANT CHANGE UP (ref 3.8–10.5)

## 2020-03-09 ENCOUNTER — OUTPATIENT (OUTPATIENT)
Dept: OUTPATIENT SERVICES | Facility: HOSPITAL | Age: 60
LOS: 1 days | Discharge: HOME | End: 2020-03-09

## 2020-03-09 DIAGNOSIS — N18.6 END STAGE RENAL DISEASE: ICD-10-CM

## 2020-03-09 LAB
ANION GAP SERPL CALC-SCNC: 9 MMOL/L — SIGNIFICANT CHANGE UP (ref 5–17)
BASOPHILS # BLD AUTO: 0.04 K/UL — SIGNIFICANT CHANGE UP (ref 0–0.2)
BASOPHILS NFR BLD AUTO: 0.7 % — SIGNIFICANT CHANGE UP (ref 0–2)
BUN SERPL-MCNC: 73 MG/DL — HIGH (ref 7–23)
CALCIUM SERPL-MCNC: 8.8 MG/DL — SIGNIFICANT CHANGE UP (ref 8.5–10.1)
CHLORIDE SERPL-SCNC: 114 MMOL/L — HIGH (ref 96–108)
CO2 SERPL-SCNC: 17 MMOL/L — LOW (ref 22–31)
CREAT SERPL-MCNC: 4.63 MG/DL — HIGH (ref 0.5–1.3)
EOSINOPHIL # BLD AUTO: 0 K/UL — SIGNIFICANT CHANGE UP (ref 0–0.5)
EOSINOPHIL NFR BLD AUTO: 0 % — SIGNIFICANT CHANGE UP (ref 0–6)
GLUCOSE SERPL-MCNC: 163 MG/DL — HIGH (ref 70–99)
HCT VFR BLD CALC: 34.1 % — LOW (ref 39–50)
HGB BLD-MCNC: 11.2 G/DL — LOW (ref 13–17)
IMM GRANULOCYTES NFR BLD AUTO: 0.2 % — SIGNIFICANT CHANGE UP (ref 0–1.5)
LYMPHOCYTES # BLD AUTO: 1.72 K/UL — SIGNIFICANT CHANGE UP (ref 1–3.3)
LYMPHOCYTES # BLD AUTO: 30.8 % — SIGNIFICANT CHANGE UP (ref 13–44)
MCHC RBC-ENTMCNC: 30.5 PG — SIGNIFICANT CHANGE UP (ref 27–34)
MCHC RBC-ENTMCNC: 32.8 GM/DL — SIGNIFICANT CHANGE UP (ref 32–36)
MCV RBC AUTO: 92.9 FL — SIGNIFICANT CHANGE UP (ref 80–100)
MONOCYTES # BLD AUTO: 0.48 K/UL — SIGNIFICANT CHANGE UP (ref 0–0.9)
MONOCYTES NFR BLD AUTO: 8.6 % — SIGNIFICANT CHANGE UP (ref 2–14)
NEUTROPHILS # BLD AUTO: 3.34 K/UL — SIGNIFICANT CHANGE UP (ref 1.8–7.4)
NEUTROPHILS NFR BLD AUTO: 59.7 % — SIGNIFICANT CHANGE UP (ref 43–77)
NRBC # BLD: 0 /100 WBCS — SIGNIFICANT CHANGE UP (ref 0–0)
PLATELET # BLD AUTO: 144 K/UL — LOW (ref 150–400)
POTASSIUM SERPL-MCNC: 4.7 MMOL/L — SIGNIFICANT CHANGE UP (ref 3.5–5.3)
POTASSIUM SERPL-SCNC: 4.7 MMOL/L — SIGNIFICANT CHANGE UP (ref 3.5–5.3)
RBC # BLD: 3.67 M/UL — LOW (ref 4.2–5.8)
RBC # FLD: 13.8 % — SIGNIFICANT CHANGE UP (ref 10.3–14.5)
SODIUM SERPL-SCNC: 140 MMOL/L — SIGNIFICANT CHANGE UP (ref 135–145)
WBC # BLD: 5.59 K/UL — SIGNIFICANT CHANGE UP (ref 3.8–10.5)
WBC # FLD AUTO: 5.59 K/UL — SIGNIFICANT CHANGE UP (ref 3.8–10.5)

## 2020-03-15 NOTE — H&P ADULT - NSTOBACCOSCREENHP_GEN_A_CS
April 25, 2017        Perez Ritchie  [address] [address]  Phone: [phone]  Fax: [phone]             [facility]  [address]  [facility] [address]  Phone: [phone]   Patient: Edilson Black   MR Number: [id number]   YOB: 1943   Date of Visit: 4/25/2017       Dear Dr. Perez Ritchie    Thank you for referring Edilson Black to me for evaluation. Attached you will find relevant portions of my assessment and plan of care.    If you have questions, please do not hesitate to call me. I look forward to following Edilson Black along with you.    Sincerely,    Gabi Mendiola MD    Enclosure    If you would like to receive this communication electronically, please contact [email or web address] or [phone] to request [facility] access.    [facility] is a tool which provides read-only access to select patient information with whom you have a relationship. Its easy to use and provides real time access to review your patients record including encounter summaries, notes, results, and demographic information.    If you feel you have received this communication in error or would no longer like to receive these types of communications, please e-mail [email or web address]        No Yes

## 2020-03-27 ENCOUNTER — APPOINTMENT (OUTPATIENT)
Age: 60
End: 2020-03-27

## 2020-07-21 NOTE — PROGRESS NOTE BEHAVIORAL HEALTH - NS ED BHA MSE SPEECH ARTICULATION
Pt was seen at PCP's office. He was evaluated for atypical chest pain, EKG done at office and pt advised to follow up with PCP. First available appointment 8/06/2020, requesting appt at [place] office only. Pt wondering if you would be able to accommodate him for an earlier appt. I do see an opening next Monday, but it's a 15 min slot   Normal

## 2021-01-13 NOTE — PROGRESS NOTE ADULT - PROBLEM SELECTOR PLAN 1
sec to stomach perforation s/p yusef patch repair   initial OR c/s and blood c/+ MSSA  on nafcillin(day 16 of 28 ) for MSSA infection   cr continues to worsen so will change nafcillin to Rocephin(day 17of 28 )      cr worsening ,will hold off on any more genta  Mild leukocytosis noted today   follow wbc trend and temp curve Negative

## 2021-03-25 NOTE — PATIENT PROFILE ADULT - NSPROMUTINFOINDIVIDFT_GEN_A_NUR
Final Anesthesia Post-op Assessment    Patient: Selwyn Gonzáles  Procedure(s) Performed: COLONOSCOPY  Anesthesia type: MAC    Vitals Value Taken Time   Temp 37 03/25/21 0838   Pulse 67 03/25/21 0825   Resp 17 03/25/21 0825   SpO2 98 % 03/25/21 0825   /78 03/25/21 0825         Patient Location: Phase II  Post-op Vital Signs:stable  Level of Consciousness: awake and alert  Respiratory Status: spontaneous ventilation  Cardiovascular stable  Hydration: euvolemic  Pain Management: adequately controlled  Handoff: Handoff to receiving nurse was performed and questions were answered  Vomiting: none   Nausea: None  Airway Patency:patent  Post-op Assessment: no complications and patient tolerated procedure well with no complications      No complications documented.   
No

## 2021-08-25 ENCOUNTER — EMERGENCY (EMERGENCY)
Facility: HOSPITAL | Age: 61
LOS: 0 days | Discharge: HOME | End: 2021-08-26
Attending: STUDENT IN AN ORGANIZED HEALTH CARE EDUCATION/TRAINING PROGRAM
Payer: COMMERCIAL

## 2021-08-25 VITALS
RESPIRATION RATE: 18 BRPM | OXYGEN SATURATION: 96 % | SYSTOLIC BLOOD PRESSURE: 93 MMHG | TEMPERATURE: 99 F | DIASTOLIC BLOOD PRESSURE: 61 MMHG | HEIGHT: 73 IN | WEIGHT: 175.05 LBS | HEART RATE: 102 BPM

## 2021-08-25 DIAGNOSIS — T50.906A UNDERDOSING OF UNSPECIFIED DRUGS, MEDICAMENTS AND BIOLOGICAL SUBSTANCES, INITIAL ENCOUNTER: ICD-10-CM

## 2021-08-25 DIAGNOSIS — F19.120 OTHER PSYCHOACTIVE SUBSTANCE ABUSE WITH INTOXICATION, UNCOMPLICATED: ICD-10-CM

## 2021-08-25 DIAGNOSIS — Z91.14 PATIENT'S OTHER NONCOMPLIANCE WITH MEDICATION REGIMEN: ICD-10-CM

## 2021-08-25 DIAGNOSIS — D64.9 ANEMIA, UNSPECIFIED: ICD-10-CM

## 2021-08-25 DIAGNOSIS — R40.0 SOMNOLENCE: ICD-10-CM

## 2021-08-25 DIAGNOSIS — Z86.19 PERSONAL HISTORY OF OTHER INFECTIOUS AND PARASITIC DISEASES: ICD-10-CM

## 2021-08-25 DIAGNOSIS — F10.120 ALCOHOL ABUSE WITH INTOXICATION, UNCOMPLICATED: ICD-10-CM

## 2021-08-25 DIAGNOSIS — K86.1 OTHER CHRONIC PANCREATITIS: ICD-10-CM

## 2021-08-25 DIAGNOSIS — Z91.128 PATIENT'S INTENTIONAL UNDERDOSING OF MEDICATION REGIMEN FOR OTHER REASON: ICD-10-CM

## 2021-08-25 DIAGNOSIS — M10.9 GOUT, UNSPECIFIED: ICD-10-CM

## 2021-08-25 LAB
ALBUMIN SERPL ELPH-MCNC: 3.1 G/DL — LOW (ref 3.3–5)
ALP SERPL-CCNC: 72 U/L — SIGNIFICANT CHANGE UP (ref 40–120)
ALT FLD-CCNC: 11 U/L — LOW (ref 12–78)
ANION GAP SERPL CALC-SCNC: 12 MMOL/L — SIGNIFICANT CHANGE UP (ref 5–17)
APAP SERPL-MCNC: SIGNIFICANT CHANGE UP UG/ML (ref 10–30)
AST SERPL-CCNC: 18 U/L — SIGNIFICANT CHANGE UP (ref 15–37)
BASOPHILS # BLD AUTO: 0.06 K/UL — SIGNIFICANT CHANGE UP (ref 0–0.2)
BASOPHILS NFR BLD AUTO: 0.6 % — SIGNIFICANT CHANGE UP (ref 0–2)
BILIRUB SERPL-MCNC: 0.2 MG/DL — SIGNIFICANT CHANGE UP (ref 0.2–1.2)
BUN SERPL-MCNC: 46 MG/DL — HIGH (ref 7–23)
CALCIUM SERPL-MCNC: 8.6 MG/DL — SIGNIFICANT CHANGE UP (ref 8.5–10.1)
CHLORIDE SERPL-SCNC: 104 MMOL/L — SIGNIFICANT CHANGE UP (ref 96–108)
CK SERPL-CCNC: 80 U/L — SIGNIFICANT CHANGE UP (ref 26–308)
CO2 SERPL-SCNC: 21 MMOL/L — LOW (ref 22–31)
CREAT SERPL-MCNC: 3.82 MG/DL — HIGH (ref 0.5–1.3)
EOSINOPHIL # BLD AUTO: 0 K/UL — SIGNIFICANT CHANGE UP (ref 0–0.5)
EOSINOPHIL NFR BLD AUTO: 0 % — SIGNIFICANT CHANGE UP (ref 0–6)
ETHANOL SERPL-MCNC: <10 MG/DL — SIGNIFICANT CHANGE UP (ref 0–10)
GLUCOSE BLDC GLUCOMTR-MCNC: 102 MG/DL — HIGH (ref 70–99)
GLUCOSE BLDC GLUCOMTR-MCNC: 106 MG/DL — HIGH (ref 70–99)
GLUCOSE SERPL-MCNC: 91 MG/DL — SIGNIFICANT CHANGE UP (ref 70–99)
HCT VFR BLD CALC: 26.6 % — LOW (ref 39–50)
HGB BLD-MCNC: 8.8 G/DL — LOW (ref 13–17)
IMM GRANULOCYTES NFR BLD AUTO: 0.3 % — SIGNIFICANT CHANGE UP (ref 0–1.5)
LIDOCAIN IGE QN: 399 U/L — HIGH (ref 73–393)
LYMPHOCYTES # BLD AUTO: 2.22 K/UL — SIGNIFICANT CHANGE UP (ref 1–3.3)
LYMPHOCYTES # BLD AUTO: 22 % — SIGNIFICANT CHANGE UP (ref 13–44)
MAGNESIUM SERPL-MCNC: 2.1 MG/DL — SIGNIFICANT CHANGE UP (ref 1.6–2.6)
MCHC RBC-ENTMCNC: 29.2 PG — SIGNIFICANT CHANGE UP (ref 27–34)
MCHC RBC-ENTMCNC: 33.1 GM/DL — SIGNIFICANT CHANGE UP (ref 32–36)
MCV RBC AUTO: 88.4 FL — SIGNIFICANT CHANGE UP (ref 80–100)
MONOCYTES # BLD AUTO: 0.83 K/UL — SIGNIFICANT CHANGE UP (ref 0–0.9)
MONOCYTES NFR BLD AUTO: 8.2 % — SIGNIFICANT CHANGE UP (ref 2–14)
NEUTROPHILS # BLD AUTO: 6.97 K/UL — SIGNIFICANT CHANGE UP (ref 1.8–7.4)
NEUTROPHILS NFR BLD AUTO: 68.9 % — SIGNIFICANT CHANGE UP (ref 43–77)
NRBC # BLD: 0 /100 WBCS — SIGNIFICANT CHANGE UP (ref 0–0)
PLATELET # BLD AUTO: 298 K/UL — SIGNIFICANT CHANGE UP (ref 150–400)
POTASSIUM SERPL-MCNC: 2.7 MMOL/L — CRITICAL LOW (ref 3.5–5.3)
POTASSIUM SERPL-SCNC: 2.7 MMOL/L — CRITICAL LOW (ref 3.5–5.3)
PROT SERPL-MCNC: 6.8 GM/DL — SIGNIFICANT CHANGE UP (ref 6–8.3)
RBC # BLD: 3.01 M/UL — LOW (ref 4.2–5.8)
RBC # FLD: 19.2 % — HIGH (ref 10.3–14.5)
SALICYLATES SERPL-MCNC: <1.7 MG/DL — LOW (ref 2.8–20)
SODIUM SERPL-SCNC: 137 MMOL/L — SIGNIFICANT CHANGE UP (ref 135–145)
WBC # BLD: 10.11 K/UL — SIGNIFICANT CHANGE UP (ref 3.8–10.5)
WBC # FLD AUTO: 10.11 K/UL — SIGNIFICANT CHANGE UP (ref 3.8–10.5)

## 2021-08-25 PROCEDURE — 72125 CT NECK SPINE W/O DYE: CPT | Mod: 26

## 2021-08-25 PROCEDURE — 70450 CT HEAD/BRAIN W/O DYE: CPT | Mod: 26

## 2021-08-25 PROCEDURE — 71045 X-RAY EXAM CHEST 1 VIEW: CPT | Mod: 26

## 2021-08-25 PROCEDURE — 99285 EMERGENCY DEPT VISIT HI MDM: CPT

## 2021-08-25 RX ORDER — MAGNESIUM OXIDE 400 MG ORAL TABLET 241.3 MG
400 TABLET ORAL ONCE
Refills: 0 | Status: DISCONTINUED | OUTPATIENT
Start: 2021-08-25 | End: 2021-08-25

## 2021-08-25 RX ORDER — POTASSIUM CHLORIDE 20 MEQ
10 PACKET (EA) ORAL ONCE
Refills: 0 | Status: COMPLETED | OUTPATIENT
Start: 2021-08-25 | End: 2021-08-25

## 2021-08-25 RX ORDER — POTASSIUM CHLORIDE 20 MEQ
40 PACKET (EA) ORAL EVERY 4 HOURS
Refills: 0 | Status: DISCONTINUED | OUTPATIENT
Start: 2021-08-25 | End: 2021-08-25

## 2021-08-25 RX ORDER — SODIUM CHLORIDE 9 MG/ML
1000 INJECTION, SOLUTION INTRAVENOUS ONCE
Refills: 0 | Status: COMPLETED | OUTPATIENT
Start: 2021-08-25 | End: 2021-08-25

## 2021-08-25 RX ADMIN — SODIUM CHLORIDE 1000 MILLILITER(S): 9 INJECTION, SOLUTION INTRAVENOUS at 21:35

## 2021-08-25 RX ADMIN — Medication 10 MILLIEQUIVALENT(S): at 21:43

## 2021-08-25 RX ADMIN — Medication 100 MILLIEQUIVALENT(S): at 20:43

## 2021-08-25 RX ADMIN — SODIUM CHLORIDE 500 MILLILITER(S): 9 INJECTION, SOLUTION INTRAVENOUS at 20:35

## 2021-08-25 NOTE — ED PROVIDER NOTE - PROGRESS NOTE DETAILS
ETOH negative, CTHEAD and neck negative, Cr at baseline. Pending sobriety The patient is clinically sober. The patient is alert and oriented x 3, is clear and coherent in conversation and has a normal gait and shows no signs of acute intoxication. The patient is safe for discharge.

## 2021-08-25 NOTE — ED ADULT NURSE NOTE - ED STAT RN HANDOFF DETAILS
Report given to receiving RN julio ,pts history, current condition and reason for admission discussed, safety concerns addressed and reviewed, pt currently in stable condition, IV flushes for patency and site shows no signs or symptoms of infiltrate, dressing is clean dry and intact,

## 2021-08-25 NOTE — ED ADULT TRIAGE NOTE - CHIEF COMPLAINT QUOTE
as per emt, pt found at home in his basement, patient's mother called. alcohol bottles and drug paraphernalia seen around patient. pt hit emt when attempting to wake patient on scene. asleep in triage.

## 2021-08-25 NOTE — ED PROVIDER NOTE - NSFOLLOWUPINSTRUCTIONS_ED_ALL_ED_FT
Rest, drink plenty of fluids.  Advance activity as tolerated.  Continue all previously prescribed medications as directed.  Follow up with your PMD 2-3 days and bring copies of your results.  Return to the ER for worsening symptoms, fevers, vomiting, headaches, shortness of breath, chest pain or new concerning symptoms.

## 2021-08-25 NOTE — ED PROVIDER NOTE - PHYSICAL EXAMINATION
VITAL SIGNS: I have reviewed nursing notes and confirm.   GEN: (+) disheveled; in no acute distress. (+) somnolent, responsive to painful stimuli.   SKIN: Warm, pink, no rash, no diaphoresis, no cyanosis, well perfused.   HEAD: Normocephalic; atraumatic. No scalp lacerations, no abrasions.  NECK: Supple; non tender.   EYES: Pupils 3mm equal, round, reactive to light and accomodation, conjunctiva and sclera clear. Extra-ocular movements intact bilaterally.  ENT: No nasal discharge; airway clear. Trachea is midline. ORAL: No oropharyngeal exudates or erythema. Normal dentition. (+) dry mm.  CV: Regular rate and rhythm. S1, S2 normal; no murmurs, gallops, or rubs. No lower extremity pitting edema bilaterally. Capillary refill < 2 seconds throughout. Distal pulses intact 2+ throughout.  RESP: CTA bilaterally. No wheezes, rales, or rhonchi.   ABD: Normal bowel sounds, soft, non-distended, non-tender, no hepatosplenomegaly. No CVA tenderness bilaterally.  MSK: Normal range of motion and movement of all 4 extremities. No joint or muscular pain throughout. No clubbing.   BACK: No thoracolumbar midline or paravertebral tenderness. No step-offs or obvious deformities.  NEURO: Alert & oriented x name, Grossly unremarkable. Sensory and motor intact throughout. No focal deficits. Normal speech and coordination.

## 2021-08-25 NOTE — ED ADULT TRIAGE NOTE - HEIGHT IN INCHES
You can access the FollowMyHealth Patient Portal offered by [facility] by registering at the following website: [email or web address]. By joining [employer]’s FollowMyHealth portal, you will also be able to view your health information using other applications (apps) compatible with our system.
1

## 2021-08-25 NOTE — ED PROVIDER NOTE - CLINICAL SUMMARY MEDICAL DECISION MAKING FREE TEXT BOX
Found on floor by mother, otherwise likely intoxication by unknown substance. Reported he took crack cocaine, likely cocaine washout as patient is somnolent. Does not appear opioid toxic, no respiratory depression.   - CT head and neck.  - labs, trop, ekg, cardiac monitor, IVF and re-eval for sobriety.

## 2021-08-25 NOTE — ED PROVIDER NOTE - PATIENT PORTAL LINK FT
You can access the FollowMyHealth Patient Portal offered by Health system by registering at the following website: [email or web address]. By joining [employer]’s FollowMyHealth portal, you will also be able to view your health information using other applications (apps) compatible with our system.

## 2021-08-25 NOTE — ED PROVIDER NOTE - OBJECTIVE STATEMENT
60M PMHx of gout, substance use, ETOH c/w chronic pancreatitis, Hepatitis C, gastric perforation s/p repair 2019, chronic anemia, non-compliance with home medications presenting with "found on floor" by mother. EMS 60M PMHx of gout, substance use, ETOH c/w chronic pancreatitis, Hepatitis C, gastric perforation s/p repair 2019, chronic anemia, non-compliance with home medications presenting with "found on floor" by mother. EMS reporting "drug paraphernalia and alcohol" around the patient. In the ED, patient somnolent and arousable to painful stimuli. Says he took crack cocaine. Otherwise, denies any other complaints, chest pain, shortness of breath, headaches, neck pain, abdominal pain, nausea/vomiting, fevers/chills.

## 2021-08-26 VITALS
RESPIRATION RATE: 17 BRPM | OXYGEN SATURATION: 100 % | SYSTOLIC BLOOD PRESSURE: 134 MMHG | TEMPERATURE: 98 F | HEART RATE: 98 BPM | DIASTOLIC BLOOD PRESSURE: 79 MMHG

## 2021-08-26 RX ORDER — KETOROLAC TROMETHAMINE 30 MG/ML
15 SYRINGE (ML) INJECTION ONCE
Refills: 0 | Status: DISCONTINUED | OUTPATIENT
Start: 2021-08-26 | End: 2021-08-26

## 2021-08-26 RX ORDER — POTASSIUM CHLORIDE 20 MEQ
10 PACKET (EA) ORAL ONCE
Refills: 0 | Status: DISCONTINUED | OUTPATIENT
Start: 2021-08-26 | End: 2021-08-26

## 2021-08-26 RX ORDER — POTASSIUM CHLORIDE 20 MEQ
10 PACKET (EA) ORAL
Refills: 0 | Status: COMPLETED | OUTPATIENT
Start: 2021-08-26 | End: 2021-08-26

## 2021-08-26 RX ADMIN — Medication 100 MILLIEQUIVALENT(S): at 01:44

## 2021-08-26 RX ADMIN — Medication 10 MILLIEQUIVALENT(S): at 02:59

## 2021-08-26 RX ADMIN — Medication 15 MILLIGRAM(S): at 03:05

## 2021-08-26 NOTE — ED PEDIATRIC NURSE REASSESSMENT NOTE - NS ED NURSE REASSESS COMMENT FT2
0630 Patient discharged, patient stating abdominal pain and chest pain w/ hiccups. MD Ryan made aware, patient still cleared for DC. Patient given metro card and shown bus route back home. Patient then stating dizzy and using walker at home, patient previously observed ambulating without difficulty. ABRAHAM Marte made aware. Patient given cane prior to DC. Patient escorted out with security.

## 2021-08-26 NOTE — ED ADULT NURSE REASSESSMENT NOTE - NS ED NURSE REASSESS COMMENT FT1
Assuming patient's care for coverage. Report received from RN and patient laying in bed asleep, in no acute distress. Assessment available on [facility]. Will continue to monitor

## 2021-12-20 ENCOUNTER — INPATIENT (INPATIENT)
Facility: HOSPITAL | Age: 61
LOS: 10 days | Discharge: HOME | End: 2021-12-31
Attending: INTERNAL MEDICINE | Admitting: INTERNAL MEDICINE
Payer: COMMERCIAL

## 2021-12-20 VITALS
OXYGEN SATURATION: 100 % | TEMPERATURE: 98 F | RESPIRATION RATE: 21 BRPM | WEIGHT: 143.08 LBS | DIASTOLIC BLOOD PRESSURE: 75 MMHG | HEIGHT: 73 IN | SYSTOLIC BLOOD PRESSURE: 128 MMHG | HEART RATE: 90 BPM

## 2021-12-20 LAB
ALBUMIN SERPL ELPH-MCNC: 3 G/DL — LOW (ref 3.3–5)
ALP SERPL-CCNC: 162 U/L — HIGH (ref 40–120)
ALT FLD-CCNC: 10 U/L — LOW (ref 12–78)
AMMONIA BLD-MCNC: 31 UMOL/L — SIGNIFICANT CHANGE UP (ref 11–32)
AMPHET UR-MCNC: NEGATIVE — SIGNIFICANT CHANGE UP
ANION GAP SERPL CALC-SCNC: 13 MMOL/L — SIGNIFICANT CHANGE UP (ref 5–17)
APAP SERPL-MCNC: < 2 UG/ML (ref 10–30)
APPEARANCE UR: CLEAR — SIGNIFICANT CHANGE UP
AST SERPL-CCNC: 15 U/L — SIGNIFICANT CHANGE UP (ref 15–37)
BACTERIA # UR AUTO: ABNORMAL
BARBITURATES UR SCN-MCNC: NEGATIVE — SIGNIFICANT CHANGE UP
BASE EXCESS BLDV CALC-SCNC: -4.9 MMOL/L — LOW (ref -2–3)
BENZODIAZ UR-MCNC: NEGATIVE — SIGNIFICANT CHANGE UP
BILIRUB SERPL-MCNC: 0.2 MG/DL — SIGNIFICANT CHANGE UP (ref 0.2–1.2)
BILIRUB UR-MCNC: NEGATIVE — SIGNIFICANT CHANGE UP
BLOOD GAS COMMENTS, VENOUS: SIGNIFICANT CHANGE UP
BUN SERPL-MCNC: 90 MG/DL — HIGH (ref 7–23)
CALCIUM SERPL-MCNC: 9 MG/DL — SIGNIFICANT CHANGE UP (ref 8.5–10.1)
CHLORIDE SERPL-SCNC: 100 MMOL/L — SIGNIFICANT CHANGE UP (ref 96–108)
CO2 BLDV-SCNC: 24 MMOL/L — SIGNIFICANT CHANGE UP (ref 22–26)
CO2 SERPL-SCNC: 21 MMOL/L — LOW (ref 22–31)
COCAINE METAB.OTHER UR-MCNC: NEGATIVE — SIGNIFICANT CHANGE UP
COLOR SPEC: YELLOW — SIGNIFICANT CHANGE UP
CREAT SERPL-MCNC: 6.28 MG/DL — HIGH (ref 0.5–1.3)
DIFF PNL FLD: NEGATIVE — SIGNIFICANT CHANGE UP
EPI CELLS # UR: SIGNIFICANT CHANGE UP
ERYTHROCYTE [SEDIMENTATION RATE] IN BLOOD: 38 MM/HR — HIGH (ref 0–20)
ETHANOL SERPL-MCNC: <10 MG/DL — SIGNIFICANT CHANGE UP (ref 0–10)
FLUAV AG NPH QL: SIGNIFICANT CHANGE UP
FLUBV AG NPH QL: SIGNIFICANT CHANGE UP
GAS PNL BLDV: SIGNIFICANT CHANGE UP
GLUCOSE SERPL-MCNC: 108 MG/DL — HIGH (ref 70–99)
GLUCOSE UR QL: NEGATIVE MG/DL — SIGNIFICANT CHANGE UP
HCO3 BLDV-SCNC: 23 MMOL/L — SIGNIFICANT CHANGE UP (ref 22–28)
HCT VFR BLD CALC: 28.7 % — LOW (ref 39–50)
HGB BLD-MCNC: 8.7 G/DL — LOW (ref 13–17)
KETONES UR-MCNC: NEGATIVE — SIGNIFICANT CHANGE UP
LACTATE SERPL-SCNC: 1.2 MMOL/L — SIGNIFICANT CHANGE UP (ref 0.7–2)
LEUKOCYTE ESTERASE UR-ACNC: NEGATIVE — SIGNIFICANT CHANGE UP
LIDOCAIN IGE QN: 361 U/L — SIGNIFICANT CHANGE UP (ref 73–393)
MCHC RBC-ENTMCNC: 22.3 PG — LOW (ref 27–34)
MCHC RBC-ENTMCNC: 30.3 GM/DL — LOW (ref 32–36)
MCV RBC AUTO: 73.4 FL — LOW (ref 80–100)
METHADONE UR-MCNC: NEGATIVE — SIGNIFICANT CHANGE UP
NITRITE UR-MCNC: NEGATIVE — SIGNIFICANT CHANGE UP
NRBC # BLD: 0 /100 WBCS — SIGNIFICANT CHANGE UP (ref 0–0)
OPIATES UR-MCNC: NEGATIVE — SIGNIFICANT CHANGE UP
PCO2 BLDV: 52 MMHG — SIGNIFICANT CHANGE UP (ref 42–55)
PCP SPEC-MCNC: SIGNIFICANT CHANGE UP
PCP UR-MCNC: NEGATIVE — SIGNIFICANT CHANGE UP
PH BLDV: 7.25 — LOW (ref 7.32–7.43)
PH UR: 6 — SIGNIFICANT CHANGE UP (ref 5–8)
PLATELET # BLD AUTO: 559 K/UL — HIGH (ref 150–400)
PO2 BLDV: 79 MMHG — HIGH (ref 25–45)
POTASSIUM SERPL-MCNC: 3.2 MMOL/L — LOW (ref 3.5–5.3)
POTASSIUM SERPL-SCNC: 3.2 MMOL/L — LOW (ref 3.5–5.3)
PROT SERPL-MCNC: 7.7 GM/DL — SIGNIFICANT CHANGE UP (ref 6–8.3)
PROT UR-MCNC: 30 MG/DL
RBC # BLD: 3.91 M/UL — LOW (ref 4.2–5.8)
RBC # FLD: 16 % — HIGH (ref 10.3–14.5)
SALICYLATES SERPL-MCNC: 1.9 MG/DL — LOW (ref 2.8–20)
SAO2 % BLDV: 97.7 % — SIGNIFICANT CHANGE UP (ref 94–98)
SARS-COV-2 RNA SPEC QL NAA+PROBE: SIGNIFICANT CHANGE UP
SODIUM SERPL-SCNC: 134 MMOL/L — LOW (ref 135–145)
SP GR SPEC: 1.01 — SIGNIFICANT CHANGE UP (ref 1.01–1.02)
THC UR QL: NEGATIVE — SIGNIFICANT CHANGE UP
UROBILINOGEN FLD QL: NEGATIVE MG/DL — SIGNIFICANT CHANGE UP
WBC # BLD: 10.46 K/UL — SIGNIFICANT CHANGE UP (ref 3.8–10.5)
WBC # FLD AUTO: 10.46 K/UL — SIGNIFICANT CHANGE UP (ref 3.8–10.5)
WBC UR QL: SIGNIFICANT CHANGE UP

## 2021-12-20 PROCEDURE — 71045 X-RAY EXAM CHEST 1 VIEW: CPT | Mod: 26

## 2021-12-20 PROCEDURE — 99222 1ST HOSP IP/OBS MODERATE 55: CPT

## 2021-12-20 PROCEDURE — 72126 CT NECK SPINE W/DYE: CPT | Mod: 26,MA

## 2021-12-20 PROCEDURE — 93010 ELECTROCARDIOGRAM REPORT: CPT

## 2021-12-20 PROCEDURE — 71275 CT ANGIOGRAPHY CHEST: CPT | Mod: 26,MA

## 2021-12-20 PROCEDURE — 72132 CT LUMBAR SPINE W/DYE: CPT | Mod: 26,MA

## 2021-12-20 PROCEDURE — 74174 CTA ABD&PLVS W/CONTRAST: CPT | Mod: 26,MA

## 2021-12-20 PROCEDURE — 99285 EMERGENCY DEPT VISIT HI MDM: CPT

## 2021-12-20 PROCEDURE — 70450 CT HEAD/BRAIN W/O DYE: CPT | Mod: 26,MA

## 2021-12-20 PROCEDURE — 72129 CT CHEST SPINE W/DYE: CPT | Mod: 26,MA

## 2021-12-20 RX ORDER — PANTOPRAZOLE SODIUM 20 MG/1
40 TABLET, DELAYED RELEASE ORAL ONCE
Refills: 0 | Status: COMPLETED | OUTPATIENT
Start: 2021-12-20 | End: 2021-12-20

## 2021-12-20 RX ORDER — MORPHINE SULFATE 50 MG/1
4 CAPSULE, EXTENDED RELEASE ORAL ONCE
Refills: 0 | Status: DISCONTINUED | OUTPATIENT
Start: 2021-12-20 | End: 2021-12-20

## 2021-12-20 RX ORDER — SODIUM CHLORIDE 9 MG/ML
1000 INJECTION INTRAMUSCULAR; INTRAVENOUS; SUBCUTANEOUS ONCE
Refills: 0 | Status: COMPLETED | OUTPATIENT
Start: 2021-12-20 | End: 2021-12-20

## 2021-12-20 RX ORDER — HYDROMORPHONE HYDROCHLORIDE 2 MG/ML
0.5 INJECTION INTRAMUSCULAR; INTRAVENOUS; SUBCUTANEOUS EVERY 12 HOURS
Refills: 0 | Status: DISCONTINUED | OUTPATIENT
Start: 2021-12-20 | End: 2021-12-22

## 2021-12-20 RX ORDER — ONDANSETRON 8 MG/1
4 TABLET, FILM COATED ORAL EVERY 6 HOURS
Refills: 0 | Status: DISCONTINUED | OUTPATIENT
Start: 2021-12-20 | End: 2021-12-23

## 2021-12-20 RX ORDER — SODIUM CHLORIDE 9 MG/ML
1000 INJECTION INTRAMUSCULAR; INTRAVENOUS; SUBCUTANEOUS
Refills: 0 | Status: DISCONTINUED | OUTPATIENT
Start: 2021-12-20 | End: 2021-12-23

## 2021-12-20 RX ORDER — HEPARIN SODIUM 5000 [USP'U]/ML
5000 INJECTION INTRAVENOUS; SUBCUTANEOUS EVERY 12 HOURS
Refills: 0 | Status: DISCONTINUED | OUTPATIENT
Start: 2021-12-20 | End: 2021-12-22

## 2021-12-20 RX ORDER — PANTOPRAZOLE SODIUM 20 MG/1
40 TABLET, DELAYED RELEASE ORAL DAILY
Refills: 0 | Status: DISCONTINUED | OUTPATIENT
Start: 2021-12-20 | End: 2021-12-22

## 2021-12-20 RX ORDER — ONDANSETRON 8 MG/1
4 TABLET, FILM COATED ORAL ONCE
Refills: 0 | Status: COMPLETED | OUTPATIENT
Start: 2021-12-20 | End: 2021-12-20

## 2021-12-20 RX ORDER — PANTOPRAZOLE SODIUM 20 MG/1
40 TABLET, DELAYED RELEASE ORAL
Refills: 0 | Status: DISCONTINUED | OUTPATIENT
Start: 2021-12-20 | End: 2021-12-20

## 2021-12-20 RX ADMIN — ONDANSETRON 4 MILLIGRAM(S): 8 TABLET, FILM COATED ORAL at 16:53

## 2021-12-20 RX ADMIN — ONDANSETRON 4 MILLIGRAM(S): 8 TABLET, FILM COATED ORAL at 23:18

## 2021-12-20 RX ADMIN — SODIUM CHLORIDE 50 MILLILITER(S): 9 INJECTION INTRAMUSCULAR; INTRAVENOUS; SUBCUTANEOUS at 21:31

## 2021-12-20 RX ADMIN — MORPHINE SULFATE 4 MILLIGRAM(S): 50 CAPSULE, EXTENDED RELEASE ORAL at 14:52

## 2021-12-20 RX ADMIN — HYDROMORPHONE HYDROCHLORIDE 0.5 MILLIGRAM(S): 2 INJECTION INTRAMUSCULAR; INTRAVENOUS; SUBCUTANEOUS at 21:50

## 2021-12-20 RX ADMIN — PANTOPRAZOLE SODIUM 40 MILLIGRAM(S): 20 TABLET, DELAYED RELEASE ORAL at 23:19

## 2021-12-20 RX ADMIN — SODIUM CHLORIDE 1000 MILLILITER(S): 9 INJECTION INTRAMUSCULAR; INTRAVENOUS; SUBCUTANEOUS at 14:48

## 2021-12-20 RX ADMIN — MORPHINE SULFATE 4 MILLIGRAM(S): 50 CAPSULE, EXTENDED RELEASE ORAL at 18:38

## 2021-12-20 RX ADMIN — HYDROMORPHONE HYDROCHLORIDE 0.5 MILLIGRAM(S): 2 INJECTION INTRAMUSCULAR; INTRAVENOUS; SUBCUTANEOUS at 21:31

## 2021-12-20 NOTE — ED ADULT NURSE NOTE - OBJECTIVE STATEMENT
Patient A&Ox4 with periods of confusion. Pt poor historian. Pt c/o N/V and weakness. Pt states he has been having episodes of N/V since January. Pt also reports ABD surgery for unknown reason. Denies chest pain, SOB. PMH pancreatitis, hepatitis C, gout Patient A&Ox4 with periods of confusion. Pt poor historian. Pt c/o N/V and weakness. Pt states he has been having episodes of N/V since January. Pt also reports ABD surgery for unknown reason. Pt noted be belly breathing. Denies chest pain, SOB. PMH pancreatitis, hepatitis C, gout

## 2021-12-20 NOTE — PATIENT PROFILE ADULT - FALL HARM RISK - HARM RISK INTERVENTIONS

## 2021-12-20 NOTE — H&P ADULT - NSHPLABSRESULTS_GEN_ALL_CORE
LABS:                        8.7    10.46 )-----------( 559      ( 20 Dec 2021 15:21 )             28.7     12-20    134<L>  |  100  |  90<H>  ----------------------------<  108<H>  3.2<L>   |  21<L>  |  6.28<H>    Ca    9.0      20 Dec 2021 15:21    TPro  7.7  /  Alb  3.0<L>  /  TBili  0.2  /  DBili  x   /  AST  15  /  ALT  10<L>  /  AlkPhos  162<H>  12-20            Lactate, Blood: 1.2 mmol/L (12-20 @ 15:21)      12-20 @ 16:34  -4.9  79

## 2021-12-20 NOTE — ED PROVIDER NOTE - PHYSICAL EXAMINATION
General: cachectic, Awake, alert. uncomfortable appearing. Well developed, hydrated and nourished. Appears stated age.   Skin: Skin in warm, dry and intact without rashes or lesions. Appropriate color for ethnicity  HENMT: head normocephalic and atraumatic; bilateral external ears without swelling. no nasal discharge. moist oral mucosa. supple neck, trachea midline  EYES: Conjunctiva clear. nonicteric sclera. EOM intact, Eyelids are normal in appearance without swelling or lesions.  Cardiac: well perfused  Respiratory: breathing comfortably on room air. no audible wheezing or stridor  Abdominal: soft, ttp diffusely, no guarding  MSK: Neck and back are without deformity, visible external skin changes, or signs of trauma. Curvature of the cervical, thoracic, and lumbar spine are within normal limits. no spinal or orhter back ttp no external signs of trauma. no apparent deficits in ROM of any extremity  Neurological: The patient is awake, alert, with shifting level of awareness of palce/situation. normal speech. CN 2-12 grossly intact. no apparent deficits. patinet initially with flaccid paralysis, though this improves

## 2021-12-20 NOTE — ED PROVIDER NOTE - OBJECTIVE STATEMENT
history of gout, ETOH abuse, chronic pancreatitis, hep C s/p treatment, prior gastric perforation with peritonitis history of gout, ETOH abuse, chronic pancreatitis, hep C s/p treatment, prior gastric perforation with peritonitis presenting with pain to his abdomen and back for 1 year. brigette iyer historian, only states that he has abdominal and back pain with the karyn pain being worse. denies any recent etoh usage

## 2021-12-20 NOTE — ED PROVIDER NOTE - CLINICAL SUMMARY MEDICAL DECISION MAKING FREE TEXT BOX
patient with back and abdominal pain with alternating neurologic deficits, spinal and aortic imaging without acute pathology. patinet in urinary retention with YUNIEL, symptoms possible 2/2 uremic encephalopathy. chen catheter ordered. potassium low, will not replet at this moment as patinet with possible YUNIEL and would like to avoid hyperkalemia. IVF provided, will require admission for further management.

## 2021-12-20 NOTE — H&P ADULT - ASSESSMENT
61  yr  m,     h/o  ETOH abuse, chronic pancreatitis, Hep C and gout  h/p  gastric perforation  in 2019, / yusef patch  pt  known to renal, had  HD in past/  not at present       *  presents with  epigastic and lower  abdominal pain, N/V x3 days.   Ct a/p, , diffuse   esophagitis, c/c  pancretitis  gi  called   on iv fluids,    *  Ct with  c/c pancreatitis   on pain meds  *  c/c  anemia, gi  d r bienstock   *  YUNIEL  on CKD  crt on arrival is 6.2   urinary retention, needing  chen   known to renal, dr sims called  on dvt ppx       rad< from: CT Angio Chest Aorta w/wo IV Cont (12.20.21 @ 15:52) >  IMPRESSION: No evidence of aortic dissection. Hiatal hernia with diffuse   esophagitis. Chronic pancreatitis.  --- End of Report ---  < end of copied text >    < from: CT Angio Chest Aorta w/wo IV Cont (12.20.21 @ 15:52) >  IMPRESSION: No evidence of aortic dissection. Hiatal hernia with diffuse   esophagitis. Chronic pancreatitis.  --- End of Report ---  < end of copied text >     ra< from: [facility] Echo Doppler (06.04.19 @ 12:49) >  Summary:   1. Normal Transesophageal Echocardiogram.   2. No vegetation seen on MV, TV, PV, AV.  [id number] Bridger Ramirez MDMD  Electronically signed on 6/14/2019 at 12:36:06 PM  < end of copied text >       -       61  yr  m,     h/o  ETOH abuse, chronic pancreatitis, Hep C and gout  h/p  gastric perforation  in 2019, / yusef patch  pt  known to renal, had  HD in past/  not at present  takes  no meds       *  presents with  epigastic and lower  abdominal pain, N/V x3 days.   Ct a/p, , diffuse   esophagitis, c/c  pancretitis  gi  called   on iv fluids,    *  Ct with  c/c pancreatitis   on pain meds  *  c/c  anemia, gi  d r bienstock   *  YUNIEL  on CKD  crt on arrival is 6.2   urinary retention, needing  chen   known to renal, dr sims called  on dvt ppx       rad< from: CT Angio Chest Aorta w/wo IV Cont (12.20.21 @ 15:52) >  IMPRESSION: No evidence of aortic dissection. Hiatal hernia with diffuse   esophagitis. Chronic pancreatitis.  --- End of Report ---  < end of copied text >    < from: CT Angio Chest Aorta w/wo IV Cont (12.20.21 @ 15:52) >  IMPRESSION: No evidence of aortic dissection. Hiatal hernia with diffuse   esophagitis. Chronic pancreatitis.  --- End of Report ---  < end of copied text >     ra< from: [facility] Echo Doppler (06.04.19 @ 12:49) >  Summary:   1. Normal Transesophageal Echocardiogram.   2. No vegetation seen on MV, TV, PV, AV.  [id number] Bridger Ramirez MDMDONTE  Electronically signed on 6/14/2019 at 12:36:06 PM  < end of copied text >       -

## 2021-12-20 NOTE — H&P ADULT - HISTORY OF PRESENT ILLNESS
61 yr  m,    h/o gout,  ex ETOH abuse, chronic pancreatitis, anemia, , CKD  ep C s/p treatment, prior gastric perforation with peritonitis presenting with lower   and  epigastic  pain . worsening  for 1 year.     patient  is  a poor historian,   denies any recent etoh usage   61 yr  m,    h/o gout,  ex ETOH abuse, chronic pancreatitis, anemia, , CKD  takes  no meds  ep C s/p treatment, prior gastric perforation with peritonitis    resenting with lower   and  epigastic  pain . worsening  for 1 year.     patient  is  a poor historian,   denies any recent etoh useept known to renal

## 2021-12-20 NOTE — ED ADULT NURSE NOTE - NSIMPLEMENTINTERV_GEN_ALL_ED
Implemented All Fall Risk Interventions:  [place] to call system. Call bell, personal items and telephone within reach. Instruct patient to call for assistance. Room bathroom lighting operational. Non-slip footwear when patient is off stretcher. Physically safe environment: no spills, clutter or unnecessary equipment. Stretcher in lowest position, wheels locked, appropriate side rails in place. Provide visual cue, wrist band, yellow gown, etc. Monitor gait and stability. Monitor for mental status changes and reorient to person, place, and time. Review medications for side effects contributing to fall risk. Reinforce activity limits and safety measures with patient and family.

## 2021-12-20 NOTE — ED ADULT TRIAGE NOTE - CHIEF COMPLAINT QUOTE
pt biba from home c/o generalized weakness, n/v x one week getting worse denies any medical hx   pt vaccinated with moderna

## 2021-12-20 NOTE — H&P ADULT - NSHPPHYSICALEXAM_GEN_ALL_CORE
PHYSICAL EXAMINATION:  Vital Signs Last 24 Hrs  T(C): 36.6 (20 Dec 2021 12:43), Max: 36.6 (20 Dec 2021 12:43)  T(F): 97.9 (20 Dec 2021 12:43), Max: 97.9 (20 Dec 2021 12:43)  HR: 83 (20 Dec 2021 17:17) (83 - 90)  BP: 117/64 (20 Dec 2021 17:17) (117/64 - 128/75)  BP(mean): --  RR: 17 (20 Dec 2021 17:17) (17 - 21)  SpO2: 100% (20 Dec 2021 17:17) (100% - 100%)  CAPILLARY BLOOD GLUCOSE            GENERAL: NAD, well-groomed,  HEAD:  atraumatic, normocephalic  EYES: sclera anicteric  ENMT: mucous membranes moist  NECK: supple, No JVD  CHEST/LUNG: clear to auscultation bilaterally;    no      rales   ,   no rhonchi,   HEART: normal S1, S2  ABDOMEN: BS+, soft, ND, NT   EXTREMITIES:    no    edema    b/l LEs  NEURO: awake, ,     moves all extremities  SKIN: no     rash

## 2021-12-21 LAB
ANION GAP SERPL CALC-SCNC: 8 MMOL/L — SIGNIFICANT CHANGE UP (ref 5–17)
BUN SERPL-MCNC: 86 MG/DL — HIGH (ref 7–23)
CALCIUM SERPL-MCNC: 8.3 MG/DL — LOW (ref 8.5–10.1)
CHLORIDE SERPL-SCNC: 106 MMOL/L — SIGNIFICANT CHANGE UP (ref 96–108)
CO2 SERPL-SCNC: 23 MMOL/L — SIGNIFICANT CHANGE UP (ref 22–31)
CREAT SERPL-MCNC: 5.72 MG/DL — HIGH (ref 0.5–1.3)
CRP SERPL-MCNC: <3 MG/L — SIGNIFICANT CHANGE UP
GLUCOSE SERPL-MCNC: 110 MG/DL — HIGH (ref 70–99)
HCT VFR BLD CALC: 25.9 % — LOW (ref 39–50)
HGB BLD-MCNC: 7.8 G/DL — LOW (ref 13–17)
MCHC RBC-ENTMCNC: 22.3 PG — LOW (ref 27–34)
MCHC RBC-ENTMCNC: 30.1 GM/DL — LOW (ref 32–36)
MCV RBC AUTO: 74 FL — LOW (ref 80–100)
NRBC # BLD: 0 /100 WBCS — SIGNIFICANT CHANGE UP (ref 0–0)
PLATELET # BLD AUTO: 510 K/UL — HIGH (ref 150–400)
POTASSIUM SERPL-MCNC: 3.1 MMOL/L — LOW (ref 3.5–5.3)
POTASSIUM SERPL-SCNC: 3.1 MMOL/L — LOW (ref 3.5–5.3)
RBC # BLD: 3.5 M/UL — LOW (ref 4.2–5.8)
RBC # FLD: 16.1 % — HIGH (ref 10.3–14.5)
SODIUM SERPL-SCNC: 137 MMOL/L — SIGNIFICANT CHANGE UP (ref 135–145)
WBC # BLD: 12.3 K/UL — HIGH (ref 3.8–10.5)
WBC # FLD AUTO: 12.3 K/UL — HIGH (ref 3.8–10.5)

## 2021-12-21 PROCEDURE — 76775 US EXAM ABDO BACK WALL LIM: CPT | Mod: 26

## 2021-12-21 PROCEDURE — 99233 SBSQ HOSP IP/OBS HIGH 50: CPT

## 2021-12-21 RX ORDER — POTASSIUM CHLORIDE 20 MEQ
20 PACKET (EA) ORAL
Refills: 0 | Status: COMPLETED | OUTPATIENT
Start: 2021-12-21 | End: 2021-12-21

## 2021-12-21 RX ORDER — ACETAMINOPHEN 500 MG
1000 TABLET ORAL ONCE
Refills: 0 | Status: COMPLETED | OUTPATIENT
Start: 2021-12-21 | End: 2021-12-21

## 2021-12-21 RX ORDER — ACETAMINOPHEN 500 MG
650 TABLET ORAL EVERY 6 HOURS
Refills: 0 | Status: DISCONTINUED | OUTPATIENT
Start: 2021-12-21 | End: 2021-12-23

## 2021-12-21 RX ORDER — LIPASE/PROTEASE/AMYLASE 16-48-48K
2 CAPSULE,DELAYED RELEASE (ENTERIC COATED) ORAL
Refills: 0 | Status: DISCONTINUED | OUTPATIENT
Start: 2021-12-21 | End: 2021-12-23

## 2021-12-21 RX ORDER — LIPASE/PROTEASE/AMYLASE 16-48-48K
1 CAPSULE,DELAYED RELEASE (ENTERIC COATED) ORAL
Refills: 0 | Status: DISCONTINUED | OUTPATIENT
Start: 2021-12-21 | End: 2021-12-21

## 2021-12-21 RX ORDER — METOCLOPRAMIDE HCL 10 MG
5 TABLET ORAL
Refills: 0 | Status: DISCONTINUED | OUTPATIENT
Start: 2021-12-21 | End: 2021-12-23

## 2021-12-21 RX ADMIN — ONDANSETRON 4 MILLIGRAM(S): 8 TABLET, FILM COATED ORAL at 11:01

## 2021-12-21 RX ADMIN — Medication 1000 MILLIGRAM(S): at 05:30

## 2021-12-21 RX ADMIN — ONDANSETRON 4 MILLIGRAM(S): 8 TABLET, FILM COATED ORAL at 23:20

## 2021-12-21 RX ADMIN — Medication 2 CAPSULE(S): at 21:07

## 2021-12-21 RX ADMIN — HEPARIN SODIUM 5000 UNIT(S): 5000 INJECTION INTRAVENOUS; SUBCUTANEOUS at 05:14

## 2021-12-21 RX ADMIN — Medication 5 MILLIGRAM(S): at 18:46

## 2021-12-21 RX ADMIN — HYDROMORPHONE HYDROCHLORIDE 0.5 MILLIGRAM(S): 2 INJECTION INTRAMUSCULAR; INTRAVENOUS; SUBCUTANEOUS at 23:21

## 2021-12-21 RX ADMIN — SODIUM CHLORIDE 100 MILLILITER(S): 9 INJECTION INTRAMUSCULAR; INTRAVENOUS; SUBCUTANEOUS at 16:17

## 2021-12-21 RX ADMIN — Medication 400 MILLIGRAM(S): at 05:14

## 2021-12-21 RX ADMIN — HYDROMORPHONE HYDROCHLORIDE 0.5 MILLIGRAM(S): 2 INJECTION INTRAMUSCULAR; INTRAVENOUS; SUBCUTANEOUS at 23:36

## 2021-12-21 RX ADMIN — HYDROMORPHONE HYDROCHLORIDE 0.5 MILLIGRAM(S): 2 INJECTION INTRAMUSCULAR; INTRAVENOUS; SUBCUTANEOUS at 11:01

## 2021-12-21 RX ADMIN — Medication 50 MILLIEQUIVALENT(S): at 18:46

## 2021-12-21 RX ADMIN — SODIUM CHLORIDE 100 MILLILITER(S): 9 INJECTION INTRAMUSCULAR; INTRAVENOUS; SUBCUTANEOUS at 11:09

## 2021-12-21 RX ADMIN — PANTOPRAZOLE SODIUM 40 MILLIGRAM(S): 20 TABLET, DELAYED RELEASE ORAL at 11:36

## 2021-12-21 RX ADMIN — Medication 50 MILLIEQUIVALENT(S): at 21:07

## 2021-12-21 RX ADMIN — Medication 50 MILLIEQUIVALENT(S): at 16:14

## 2021-12-21 RX ADMIN — HEPARIN SODIUM 5000 UNIT(S): 5000 INJECTION INTRAVENOUS; SUBCUTANEOUS at 18:47

## 2021-12-21 RX ADMIN — HYDROMORPHONE HYDROCHLORIDE 0.5 MILLIGRAM(S): 2 INJECTION INTRAMUSCULAR; INTRAVENOUS; SUBCUTANEOUS at 11:11

## 2021-12-21 NOTE — CHART NOTE - NSCHARTNOTEFT_GEN_A_CORE
This is 61  yr  M, h/o  ETOH abuse, chronic pancreatitis, Hep C and gout, h/p  gastric perforation  in 2019, / yusef patch/ Patient came in for N/V x 3 days. CT of abdomen and Pelvis shows diffuse esophagitis and chronic pancreatitis. Patient was admitted for abdominal pain. Patient complains of vomiting. Patients to having abdominal pain in the epigastric region. Pain is burning in nature and cramping. The pain radiates to the LUQ region and rates it 4/10. Patient admits to nausea and decrease in appetite. Patient denies chest pain, palpitations, SOB, and hematemesis. patient also denies diarrhea, fever and chills. Patient states "food and water alleviate pain", but it makes him nauseous along with vomiting as well.     Vital Signs Last 24 Hrs  T(C): 36.6 (20 Dec 2021 23:46), Max: 36.7 (20 Dec 2021 18:32)  T(F): 97.8 (20 Dec 2021 23:46), Max: 98 (20 Dec 2021 18:32)  HR: 80 (20 Dec 2021 23:46) (79 - 90)  BP: 110/62 (20 Dec 2021 23:46) (110/62 - 128/75)  BP(mean): --  RR: 18 (20 Dec 2021 23:46) (16 - 21)  SpO2: 100% (20 Dec 2021 23:46) (96% - 100%)    T(C): 36.6 (12-20-21 @ 23:46), Max: 36.7 (12-20-21 @ 18:32)  HR: 80 (12-20-21 @ 23:46) (79 - 90)  BP: 110/62 (12-20-21 @ 23:46) (110/62 - 128/75)  RR: 18 (12-20-21 @ 23:46) (16 - 21)  SpO2: 100% (12-20-21 @ 23:46) (96% - 100%)    CONSTITUTIONAL: poorly groomed, in mild distress, vomiting  EYES: PERRLA and symmetric  RESPIRATORY: No respiratory distress, no use of accessory muscles; CTA b/l  CARDIOVASCULAR: RRRR, +S1S2, no murmurs  GASTROINTESTINAL: Tender abdomen noted in the epigastric region and LUQ. nontender elsewhere. Soft, non distended, no rebound, no guarding    A/P    IV Protonix  NPO for the night  IV Zofran  Lipase and Amylase This is 61  yr  M, h/o  ETOH abuse, chronic pancreatitis, Hep C and gout, h/p  gastric perforation  in 2019, / yusef patch/ Patient came in for N/V x 3 days. CT of abdomen and Pelvis shows diffuse esophagitis and chronic pancreatitis. Patient was admitted for abdominal pain. Patient complains of vomiting. Patients to having abdominal pain in the epigastric region. Pain is burning in nature and cramping. The pain radiates to the LUQ region and rates it 4/10. Patient admits to nausea and decrease in appetite. Patient denies chest pain, palpitations, SOB, and hematemesis. patient also denies diarrhea, fever and chills. Patient states "food and water alleviate pain", but it makes him nauseous along with vomiting as well.     Vital Signs Last 24 Hrs  T(C): 36.6 (20 Dec 2021 23:46), Max: 36.7 (20 Dec 2021 18:32)  T(F): 97.8 (20 Dec 2021 23:46), Max: 98 (20 Dec 2021 18:32)  HR: 80 (20 Dec 2021 23:46) (79 - 90)  BP: 110/62 (20 Dec 2021 23:46) (110/62 - 128/75)  BP(mean): --  RR: 18 (20 Dec 2021 23:46) (16 - 21)  SpO2: 100% (20 Dec 2021 23:46) (96% - 100%)    T(C): 36.6 (12-20-21 @ 23:46), Max: 36.7 (12-20-21 @ 18:32)  HR: 80 (12-20-21 @ 23:46) (79 - 90)  BP: 110/62 (12-20-21 @ 23:46) (110/62 - 128/75)  RR: 18 (12-20-21 @ 23:46) (16 - 21)  SpO2: 100% (12-20-21 @ 23:46) (96% - 100%)    CONSTITUTIONAL: poorly groomed, in mild distress, vomiting  EYES: PERRLA and symmetric  RESPIRATORY: No respiratory distress, no use of accessory muscles; CTA b/l  CARDIOVASCULAR: RRRR, +S1S2, no murmurs  GASTROINTESTINAL: Tender abdomen noted in the epigastric region and LUQ. nontender elsewhere. Soft, non distended, no rebound, no guarding    A/P    IV Protonix  NPO for the night  IV Zofran

## 2021-12-21 NOTE — CONSULT NOTE ADULT - SUBJECTIVE AND OBJECTIVE BOX
HPI:    61 yr  m,    h/o gout,  ex ETOH abuse, chronic pancreatitis, anemia, , CKD  takes  no meds  ep C s/p treatment, prior gastric perforation with peritonitis    resenting with lower   and  epigastic  pain . worsening  for 1 year.     patient  is  a poor historian,   denies any recent etoh useept known to renal (20 Dec 2021 18:23)      PAST MEDICAL & SURGICAL HISTORY:  ETOH abuse    Pancreatitis    Hepatitis C  treated    Gout    No significant past surgical history        MEDICATIONS  (STANDING):  heparin   Injectable 5000 Unit(s) SubCutaneous every 12 hours  pantoprazole  Injectable 40 milliGRAM(s) IV Push daily  potassium chloride  20 mEq/100 mL IVPB 20 milliEquivalent(s) IV Intermittent every 2 hours  sodium chloride 0.9%. 1000 milliLiter(s) (100 mL/Hr) IV Continuous <Continuous>    MEDICATIONS  (PRN):  HYDROmorphone  Injectable 0.5 milliGRAM(s) IV Push every 12 hours PRN Moderate Pain (4 - 6)  ondansetron Injectable 4 milliGRAM(s) IV Push every 6 hours PRN Vomiting      Allergies    No Known Allergies    Intolerances        FAMILY HISTORY:  No pertinent family history in first degree relatives        REVIEW OF SYSTEMS:    CONSTITUTIONAL: No fever, weight loss, or fatigue  EYES: No eye pain, visual disturbances, or discharge  ENMT:  No difficulty hearing, tinnitus, vertigo; No sinus or throat pain  NECK: No pain or stiffness  BREASTS: No pain, masses, or nipple discharge  RESPIRATORY: No cough, wheezing, chills or hemoptysis; No shortness of breath  CARDIOVASCULAR: No chest pain, palpitations, dizziness, or leg swelling  GASTROINTESTINAL: No abdominal or epigastric pain. No nausea, vomiting, or hematemesis; No diarrhea or constipation. No melena or hematochezia.  GENITOURINARY: No dysuria, frequency, hematuria, or incontinence  NEUROLOGICAL: No headaches, memory loss, loss of strength, numbness, or tremors  SKIN: No itching, burning, rashes, or lesions   LYMPH NODES: No enlarged glands  ENDOCRINE: No heat or cold intolerance; No hair loss  MUSCULOSKELETAL: No joint pain or swelling; No muscle, back, or extremity pain  PSYCHIATRIC: No depression, anxiety, mood swings, or difficulty sleeping  HEME/LYMPH: No easy bruising, or bleeding gums  ALLERGY AND IMMUNOLOGIC: No hives or eczema          SOCIAL HISTORY:    FAMILY HISTORY:  No pertinent family history in first degree relatives        Vital Signs Last 24 Hrs  T(C): 36.6 (21 Dec 2021 11:27), Max: 36.9 (21 Dec 2021 05:13)  T(F): 97.9 (21 Dec 2021 11:27), Max: 98.4 (21 Dec 2021 05:13)  HR: 82 (21 Dec 2021 11:27) (79 - 88)  BP: 100/64 (21 Dec 2021 11:27) (100/64 - 117/64)  BP(mean): --  RR: 18 (21 Dec 2021 11:27) (16 - 18)  SpO2: 98% (21 Dec 2021 11:27) (96% - 100%)    PHYSICAL EXAM:    GENERAL: NAD, well-groomed, well-developed  HEAD:  Atraumatic, Normocephalic  EYES: EOMI, PERRLA, conjunctiva and sclera clear  ENMT: No tonsillar erythema, exudates, or enlargement; Moist mucous membranes, Good dentition, No lesions  NECK: Supple, No JVD, Normal thyroid  NERVOUS SYSTEM:  Alert & Oriented X3, Good concentration; Motor Strength 5/5 B/L upper and lower extremities; DTRs 2+ intact and symmetric  CHEST/LUNG: Clear to percussion bilaterally; No rales, rhonchi, wheezing, or rubs  HEART: Regular rate and rhythm; No murmurs, rubs, or gallops  ABDOMEN: Soft, Nontender, Nondistended; Bowel sounds present  EXTREMITIES:  2+ Peripheral Pulses, No clubbing, cyanosis, or edema  LYMPH: No lymphadenopathy noted   RECTAL: No masses, prostate normal size and smooth, Guaiaci negative   BREAST: No palpable masses, skin no lesions no retractions, no discharges. adnexal no palpable masses noted   GYN: uterus normal size, adnexal, no palpable masses, no CMT, no uterine discharge   SKIN: No rashes or lesions    LABS:                        7.8    12.30 )-----------( 510      ( 21 Dec 2021 07:00 )             25.9       CBC:  12-21 @ 07:00  WBC  12.30  HGB 7.8  HCT 25.9 Plate 510  MCV 74.0  12-20 @ 15:21  WBC  10.46  HGB 8.7  HCT 28.7 Plate 559  MCV 73.4           21 Dec 2021 07:00    137    |  106    |  86     ----------------------------<  110    3.1     |  23     |  5.72   20 Dec 2021 15:21    134    |  100    |  90     ----------------------------<  108    3.2     |  21     |  6.28     Ca    8.3        21 Dec 2021 07:00  Ca    9.0        20 Dec 2021 15:21    TPro  7.7    /  Alb  3.0    /  TBili  0.2    /  DBili  x      /  AST  15     /  ALT  10     /  AlkPhos  162    20 Dec 2021 15:21      Urinalysis Basic - ( 20 Dec 2021 19:52 )    Color: Yellow / Appearance: Clear / S.010 / pH: x  Gluc: x / Ketone: Negative  / Bili: Negative / Urobili: Negative mg/dL   Blood: x / Protein: 30 mg/dL / Nitrite: Negative   Leuk Esterase: Negative / RBC: x / WBC 0-2   Sq Epi: x / Non Sq Epi: Occasional / Bacteria: Few      C-Reactive Protein, Serum: <3 mg/L ( @ 02:29)      RADIOLOGY & ADDITIONAL STUDIES: HPI:    61 yr  m,    h/o gout,  ex ETOH abuse, chronic pancreatitis, anemia, , CKD  takes  no meds  ep C s/p treatment, prior gastric perforation with peritonitis    resenting with lower   and  epigastic  pain . worsening  for 1 year.     patient  is  a poor historian,   denies any recent etoh useept known to renal (20 Dec 2021 18:23)  ------- As Above -------------  The       PAST MEDICAL & SURGICAL HISTORY:  ETOH abuse    Pancreatitis    Hepatitis C  treated    Gout    No significant past surgical history        MEDICATIONS  (STANDING):  heparin   Injectable 5000 Unit(s) SubCutaneous every 12 hours  pantoprazole  Injectable 40 milliGRAM(s) IV Push daily  potassium chloride  20 mEq/100 mL IVPB 20 milliEquivalent(s) IV Intermittent every 2 hours  sodium chloride 0.9%. 1000 milliLiter(s) (100 mL/Hr) IV Continuous <Continuous>    MEDICATIONS  (PRN):  HYDROmorphone  Injectable 0.5 milliGRAM(s) IV Push every 12 hours PRN Moderate Pain (4 - 6)  ondansetron Injectable 4 milliGRAM(s) IV Push every 6 hours PRN Vomiting      Allergies    No Known Allergies    Intolerances        FAMILY HISTORY:  No pertinent family history in first degree relatives        REVIEW OF SYSTEMS:    CONSTITUTIONAL: No fever, weight loss, or fatigue  EYES: No eye pain, visual disturbances, or discharge  ENMT:  No difficulty hearing, tinnitus, vertigo; No sinus or throat pain  NECK: No pain or stiffness  BREASTS: No pain, masses, or nipple discharge  RESPIRATORY: No cough, wheezing, chills or hemoptysis; No shortness of breath  CARDIOVASCULAR: No chest pain, palpitations, dizziness, or leg swelling  GASTROINTESTINAL: No abdominal or epigastric pain. No nausea, vomiting, or hematemesis; No diarrhea or constipation. No melena or hematochezia.  GENITOURINARY: No dysuria, frequency, hematuria, or incontinence  NEUROLOGICAL: No headaches, memory loss, loss of strength, numbness, or tremors  SKIN: No itching, burning, rashes, or lesions   LYMPH NODES: No enlarged glands  ENDOCRINE: No heat or cold intolerance; No hair loss  MUSCULOSKELETAL: No joint pain or swelling; No muscle, back, or extremity pain  PSYCHIATRIC: No depression, anxiety, mood swings, or difficulty sleeping  HEME/LYMPH: No easy bruising, or bleeding gums  ALLERGY AND IMMUNOLOGIC: No hives or eczema          SOCIAL HISTORY:    FAMILY HISTORY:  No pertinent family history in first degree relatives        Vital Signs Last 24 Hrs  T(C): 36.6 (21 Dec 2021 11:27), Max: 36.9 (21 Dec 2021 05:13)  T(F): 97.9 (21 Dec 2021 11:27), Max: 98.4 (21 Dec 2021 05:13)  HR: 82 (21 Dec 2021 11:) (79 - 88)  BP: 100/64 (21 Dec 2021 11:27) (100/64 - 117/64)  BP(mean): --  RR: 18 (21 Dec 2021 11:27) (16 - 18)  SpO2: 98% (21 Dec 2021 11:27) (96% - 100%)    PHYSICAL EXAM:    GENERAL: NAD, well-groomed, well-developed  HEAD:  Atraumatic, Normocephalic  EYES: EOMI, PERRLA, conjunctiva and sclera clear  ENMT: No tonsillar erythema, exudates, or enlargement; Moist mucous membranes, Good dentition, No lesions  NECK: Supple, No JVD, Normal thyroid  NERVOUS SYSTEM:  Alert & Oriented X3, Good concentration; Motor Strength 5/5 B/L upper and lower extremities; DTRs 2+ intact and symmetric  CHEST/LUNG: Clear to percussion bilaterally; No rales, rhonchi, wheezing, or rubs  HEART: Regular rate and rhythm; No murmurs, rubs, or gallops  ABDOMEN: Soft, Nontender, Nondistended; Bowel sounds present  EXTREMITIES:  2+ Peripheral Pulses, No clubbing, cyanosis, or edema  LYMPH: No lymphadenopathy noted   RECTAL: No masses, prostate normal size and smooth, Guaiaci negative   BREAST: No palpable masses, skin no lesions no retractions, no discharges. adnexal no palpable masses noted   GYN: uterus normal size, adnexal, no palpable masses, no CMT, no uterine discharge   SKIN: No rashes or lesions    LABS:                        7.8    12.30 )-----------( 510      ( 21 Dec 2021 07:00 )             25.9       CBC:  12-21 @ 07:00  WBC  12.30  HGB 7.8  HCT 25.9 Plate 510  MCV 74.0  12-20 @ 15:21  WBC  10.46  HGB 8.7  HCT 28.7 Plate 559  MCV 73.4           21 Dec 2021 07:00    137    |  106    |  86     ----------------------------<  110    3.1     |  23     |  5.72   20 Dec 2021 15:21    134    |  100    |  90     ----------------------------<  108    3.2     |  21     |  6.28     Ca    8.3        21 Dec 2021 07:00  Ca    9.0        20 Dec 2021 15:21    TPro  7.7    /  Alb  3.0    /  TBili  0.2    /  DBili  x      /  AST  15     /  ALT  10     /  AlkPhos  162    20 Dec 2021 15:21      Urinalysis Basic - ( 20 Dec 2021 19:52 )    Color: Yellow / Appearance: Clear / S.010 / pH: x  Gluc: x / Ketone: Negative  / Bili: Negative / Urobili: Negative mg/dL   Blood: x / Protein: 30 mg/dL / Nitrite: Negative   Leuk Esterase: Negative / RBC: x / WBC 0-2   Sq Epi: x / Non Sq Epi: Occasional / Bacteria: Few      C-Reactive Protein, Serum: <3 mg/L ( @ 02:29)      RADIOLOGY & ADDITIONAL STUDIES: HPI:    61 yr  m,    h/o gout,  ex ETOH abuse, chronic pancreatitis, anemia, , CKD  takes  no meds  ep C s/p treatment, prior gastric perforation with peritonitis    resenting with lower   and  epigastic  pain . worsening  for 1 year.     patient  is  a poor historian,   denies any recent etoh useept known to renal (20 Dec 2021 18:23)  ------- As Above ------------- Patient is a poor historian ( as is his mom )   The patient presents with N/V and abdominal pain x 3 days although mom / chart states this has a been a longer problem. History of ETOH abuse but has stopped. They deny  NSAIDs  Patient is not feeling any better over the past 24 hours. CT scan c/w chronic pancreatitis. Patient found to have significant elevated creatinine  States that he had a colonoscopy 3 years ago. History of hepatitis C that was treated.       PAST MEDICAL & SURGICAL HISTORY:  ETOH abuse    Pancreatitis    Hepatitis C  treated    Gout    No significant past surgical history        MEDICATIONS  (STANDING):  heparin   Injectable 5000 Unit(s) SubCutaneous every 12 hours  pantoprazole  Injectable 40 milliGRAM(s) IV Push daily  potassium chloride  20 mEq/100 mL IVPB 20 milliEquivalent(s) IV Intermittent every 2 hours  sodium chloride 0.9%. 1000 milliLiter(s) (100 mL/Hr) IV Continuous <Continuous>    MEDICATIONS  (PRN):  HYDROmorphone  Injectable 0.5 milliGRAM(s) IV Push every 12 hours PRN Moderate Pain (4 - 6)  ondansetron Injectable 4 milliGRAM(s) IV Push every 6 hours PRN Vomiting      Allergies    No Known Allergies    Intolerances        FAMILY HISTORY:  No pertinent family history in first degree relatives        REVIEW OF SYSTEMS:    CONSTITUTIONAL: No fever, weight loss,   EYES: No eye pain, visual disturbances, or discharge  ENMT:  No difficulty hearing, tinnitus, vertigo; No sinus or throat pain  NECK: No pain or stiffness  BREASTS: No pain, masses, or nipple discharge  RESPIRATORY: No cough, wheezing, chills or hemoptysis; No shortness of breath  CARDIOVASCULAR: No chest pain, palpitations, dizziness, or leg swelling  GASTROINTESTINAL: See above   GENITOURINARY: No dysuria, frequency, hematuria, or incontinence  NEUROLOGICAL: No headaches, memory loss, loss of strength, numbness, or tremors  SKIN: No itching, burning, rashes, or lesions   LYMPH NODES: No enlarged glands  ENDOCRINE: No heat or cold intolerance; No hair loss  MUSCULOSKELETAL: No joint pain or swelling; No muscle, back, or extremity pain  PSYCHIATRIC: No depression, anxiety, mood swings, or difficulty sleeping  HEME/LYMPH: No easy bruising, or bleeding gums  ALLERGY AND IMMUNOLOGIC: No hives or eczema          SOCIAL HISTORY:    FAMILY HISTORY:  No pertinent family history in first degree relatives        Vital Signs Last 24 Hrs  T(C): 36.6 (21 Dec 2021 11:27), Max: 36.9 (21 Dec 2021 05:13)  T(F): 97.9 (21 Dec 2021 11:27), Max: 98.4 (21 Dec 2021 05:13)  HR: 82 (21 Dec 2021 11:) (79 - 88)  BP: 100/64 (21 Dec 2021 11:27) (100/64 - 117/64)  BP(mean): --  RR: 18 (21 Dec 2021 11:27) (16 - 18)  SpO2: 98% (21 Dec 2021 11:27) (96% - 100%)    PHYSICAL EXAM:    GENERAL: NAD, well-groomed, well-developed  HEAD:  Atraumatic, Normocephalic  EYES: EOMI, PERRLA, conjunctiva and sclera clear  NECK: Supple, No JVD, Normal thyroid  NERVOUS SYSTEM:  Alert & Oriented X3, Good concentration; Motor Strength 5/5 B/L upper and lower extremities  CHEST/LUNG: Clear to percussion bilaterally; No rales, rhonchi, wheezing, or rubs  HEART: Regular rate and rhythm; No murmurs, rubs, or gallops  ABDOMEN: Soft, Nontender, Nondistended; Bowel sounds present  EXTREMITIES:  2+ Peripheral Pulses, No clubbing, cyanosis, or edema  LYMPH: No lymphadenopathy noted   RECTAL:  see above    SKIN: No rashes or lesions    LABS:                        7.8    12.30 )-----------( 510      ( 21 Dec 2021 07:00 )             25.9       CBC:  12-21 @ 07:00  WBC  12.30  HGB 7.8  HCT 25.9 Plate 510  MCV 74.0  12-20 @ 15:21  WBC  10.46  HGB 8.7  HCT 28.7 Plate 559  MCV 73.4           21 Dec 2021 07:00    137    |  106    |  86     ----------------------------<  110    3.1     |  23     |  5.72   20 Dec 2021 15:21    134    |  100    |  90     ----------------------------<  108    3.2     |  21     |  6.28     Ca    8.3        21 Dec 2021 07:00  Ca    9.0        20 Dec 2021 15:21    TPro  7.7    /  Alb  3.0    /  TBili  0.2    /  DBili  x      /  AST  15     /  ALT  10     /  AlkPhos  162    20 Dec 2021 15:21      Urinalysis Basic - ( 20 Dec 2021 19:52 )    Color: Yellow / Appearance: Clear / S.010 / pH: x  Gluc: x / Ketone: Negative  / Bili: Negative / Urobili: Negative mg/dL   Blood: x / Protein: 30 mg/dL / Nitrite: Negative   Leuk Esterase: Negative / RBC: x / WBC 0-2   Sq Epi: x / Non Sq Epi: Occasional / Bacteria: Few      C-Reactive Protein, Serum: <3 mg/L ( @ 02:29)      RADIOLOGY & ADDITIONAL STUDIES:  < from: CT Angio Abdomen and Pelvis w/ IV Cont (21 @ 15:52) >    ACC: [id number] EXAM:  CT ANGIO ABD PELV (W)AW IC                        ACC: [id number] EXAM:  CT ANGIO CHEST AORTA [facility]                          PROCEDURE DATE:  2021          INTERPRETATION:  CLINICAL INFORMATION: "Weakness".    COMPARISON: Noncontrast CT of the abdomen and pelvis dated 2019.   .    CONTRAST/COMPLICATIONS:  IV Contrast: Omnipaque 350  90 cc administered  Oral Contrast: NONE  Complications: None reported at time of study completion    PROCEDURE:  CT Angiography ofthe Chest, Abdomen and Pelvis.  Precontrast imaging was performed through the chest followed by arterial   phase imaging of the chest, abdomen and pelvis.  Sagittal and coronal reformats were performed as well as 3D (MIP)   reconstructions.    FINDINGS:  CHEST:  LUNGS AND LARGE AIRWAYS: Patent central airways. Trace cystic changes at   the apices. No pulmonary nodules.  PLEURA: No pleural effusion. No pneumothorax or pneumomediastinum.  VESSELS: The thoracic aorta is unremarkable with no evidenceof aneurysm,   dissection, intramural hemorrhage, or penetrating atherosclerotic ulcer.   There is no evidence of filling defect in the first, second, or third   order branches of the pulmonary arteries. The pulmonary trunk and main   pulmonary arteries are unremarkable.  HEART: Heart size is normal. No pericardial effusion.  MEDIASTINUM AND LAKE: No lymphadenopathy.  CHEST WALL AND LOWER NECK: Within normal limits.    ABDOMEN AND PELVIS:  LIVER: Within normal limits.  BILE DUCTS: Normal caliber.  GALLBLADDER: Within normal limits.  SPLEEN: Within normal limits.  PANCREAS: Again is noted moderate pancreatic atrophy with extensive   dystrophic calcification throughout, compatible with chronic   pancreatitis. There is severe dilatation of the pancreatic duct within   the body and tail secondary to creations in the pancreatic duct. There is   no peripancreatic stranding or fluid collection.  ADRENALS: Within normal limits.  KIDNEYS/URETERS: The right kidney is moderately atrophic. Small left  renal cyst. Otherwise, within normal limits.    BLADDER: Within normal limits.  REPRODUCTIVE ORGANS: The prostate is prominent.    BOWEL: There is a moderate hiatal hernia. There is moderate concentric   low-attenuation wall thickening of the esophagus with prominent mucosal   enhancement, suggestive of use esophagitis. No bowel obstruction.   Appendix is normal.  PERITONEUM: No ascites.  VESSELS: Within normal limits.  RETROPERITONEUM/LYMPH NODES: No lymphadenopathy.  ABDOMINAL WALL: Within normal limits.  BONES: Within normal limits.    IMPRESSION: No evidence of aortic dissection. Hiatal hernia with diffuse   esophagitis. Chronic pancreatitis.    --- End of Report ---            REYNALDO SPRAGUE MD; Attending Radiologist  This document has beenelectronically signed. Dec 20 2021  4:17PM    < end of copied text >   HPI:    61 yr  m,    h/o gout,  ex ETOH abuse, chronic pancreatitis, anemia, , CKD  takes  no meds  ep C s/p treatment, prior gastric perforation with peritonitis    resenting with lower   and  epigastic  pain . worsening  for 1 year.     patient  is  a poor historian,   denies any recent etoh useept known to renal (20 Dec 2021 18:23)  ------- As Above ------------- Patient is a poor historian ( as is his mom )   The patient presents with N/V and abdominal pain x 3 days although mom / chart states this has a been a longer problem. History of ETOH abuse but has stopped. They deny  NSAIDs  Patient is not feeling any better over the past 24 hours. CT scan c/w chronic pancreatitis. Patient found to have significant elevated creatinine  States that he had a colonoscopy 3 years ago. History of hepatitis C that was treated.       PAST MEDICAL & SURGICAL HISTORY:  ETOH abuse    Pancreatitis    Hepatitis C  treated    Gout    No significant past surgical history        MEDICATIONS  (STANDING):  heparin   Injectable 5000 Unit(s) SubCutaneous every 12 hours  pantoprazole  Injectable 40 milliGRAM(s) IV Push daily  potassium chloride  20 mEq/100 mL IVPB 20 milliEquivalent(s) IV Intermittent every 2 hours  sodium chloride 0.9%. 1000 milliLiter(s) (100 mL/Hr) IV Continuous <Continuous>    MEDICATIONS  (PRN):  HYDROmorphone  Injectable 0.5 milliGRAM(s) IV Push every 12 hours PRN Moderate Pain (4 - 6)  ondansetron Injectable 4 milliGRAM(s) IV Push every 6 hours PRN Vomiting      Allergies    No Known Allergies    Intolerances        FAMILY HISTORY:  No pertinent family history in first degree relatives        REVIEW OF SYSTEMS:    CONSTITUTIONAL: No fever, weight loss,   EYES: No eye pain, visual disturbances, or discharge  ENMT:  No difficulty hearing, tinnitus, vertigo; No sinus or throat pain  NECK: No pain or stiffness  BREASTS: No pain, masses, or nipple discharge  RESPIRATORY: No cough, wheezing, chills or hemoptysis; No shortness of breath  CARDIOVASCULAR: No chest pain, palpitations, dizziness, or leg swelling  GASTROINTESTINAL: See above   GENITOURINARY: No dysuria, frequency, hematuria, or incontinence  NEUROLOGICAL: No headaches,  loss of strength, numbness, or tremors  SKIN: No itching, burning, rashes, or lesions   LYMPH NODES: No enlarged glands  ENDOCRINE: No heat or cold intolerance; No hair loss  MUSCULOSKELETAL: No joint pain or swelling; No muscle, back, or extremity pain  PSYCHIATRIC: No depression, anxiety, mood swings, or difficulty sleeping  HEME/LYMPH: No easy bruising, or bleeding gums  ALLERGY AND IMMUNOLOGIC: No hives or eczema          SOCIAL HISTORY:    FAMILY HISTORY:  No pertinent family history in first degree relatives        Vital Signs Last 24 Hrs  T(C): 36.6 (21 Dec 2021 11:27), Max: 36.9 (21 Dec 2021 05:13)  T(F): 97.9 (21 Dec 2021 11:27), Max: 98.4 (21 Dec 2021 05:13)  HR: 82 (21 Dec 2021 11:) (79 - 88)  BP: 100/64 (21 Dec 2021 11:27) (100/64 - 117/64)  BP(mean): --  RR: 18 (21 Dec 2021 11:27) (16 - 18)  SpO2: 98% (21 Dec 2021 11:27) (96% - 100%)    PHYSICAL EXAM:    GENERAL: NAD, well-groomed, well-developed  HEAD:  Atraumatic, Normocephalic  EYES: EOMI, PERRLA, conjunctiva and sclera clear  NECK: Supple, No JVD, Normal thyroid  NERVOUS SYSTEM:  Alert & Oriented X3, Good concentration; Motor Strength 5/5 B/L upper and lower extremities  CHEST/LUNG: Clear to percussion bilaterally; No rales, rhonchi, wheezing, or rubs  HEART: Regular rate and rhythm; No murmurs, rubs, or gallops  ABDOMEN: Soft, Nontender, Nondistended; Bowel sounds present  EXTREMITIES:  2+ Peripheral Pulses, No clubbing, cyanosis, or edema  LYMPH: No lymphadenopathy noted   RECTAL:  Deferred  SKIN: No rashes or lesions    LABS:                        7.8    12.30 )-----------( 510      ( 21 Dec 2021 07:00 )             25.9       CBC:  12-21 @ 07:00  WBC  12.30  HGB 7.8  HCT 25.9 Plate 510  MCV 74.0  12-20 @ 15:21  WBC  10.46  HGB 8.7  HCT 28.7 Plate 559  MCV 73.4           21 Dec 2021 07:00    137    |  106    |  86     ----------------------------<  110    3.1     |  23     |  5.72   20 Dec 2021 15:21    134    |  100    |  90     ----------------------------<  108    3.2     |  21     |  6.28     Ca    8.3        21 Dec 2021 07:00  Ca    9.0        20 Dec 2021 15:21    TPro  7.7    /  Alb  3.0    /  TBili  0.2    /  DBili  x      /  AST  15     /  ALT  10     /  AlkPhos  162    20 Dec 2021 15:21      Urinalysis Basic - ( 20 Dec 2021 19:52 )    Color: Yellow / Appearance: Clear / S.010 / pH: x  Gluc: x / Ketone: Negative  / Bili: Negative / Urobili: Negative mg/dL   Blood: x / Protein: 30 mg/dL / Nitrite: Negative   Leuk Esterase: Negative / RBC: x / WBC 0-2   Sq Epi: x / Non Sq Epi: Occasional / Bacteria: Few      C-Reactive Protein, Serum: <3 mg/L ( @ 02:29)      RADIOLOGY & ADDITIONAL STUDIES:  < from: CT Angio Abdomen and Pelvis w/ IV Cont (21 @ 15:52) >    ACC: [id number] EXAM:  CT ANGIO ABD PELV (W)AW IC                        ACC: [id number] EXAM:  CT ANGIO CHEST [facility]ORTA [facility]                          PROCEDURE DATE:  2021          INTERPRETATION:  CLINICAL INFORMATION: "Weakness".    COMPARISON: Noncontrast CT of the abdomen and pelvis dated 2019.   .    CONTRAST/COMPLICATIONS:  IV Contrast: Omnipaque 350  90 cc administered  Oral Contrast: NONE  Complications: None reported at time of study completion    PROCEDURE:  CT Angiography ofthe Chest, Abdomen and Pelvis.  Precontrast imaging was performed through the chest followed by arterial   phase imaging of the chest, abdomen and pelvis.  Sagittal and coronal reformats were performed as well as 3D (MIP)   reconstructions.    FINDINGS:  CHEST:  LUNGS AND LARGE AIRWAYS: Patent central airways. Trace cystic changes at   the apices. No pulmonary nodules.  PLEURA: No pleural effusion. No pneumothorax or pneumomediastinum.  VESSELS: The thoracic aorta is unremarkable with no evidenceof aneurysm,   dissection, intramural hemorrhage, or penetrating atherosclerotic ulcer.   There is no evidence of filling defect in the first, second, or third   order branches of the pulmonary arteries. The pulmonary trunk and main   pulmonary arteries are unremarkable.  HEART: Heart size is normal. No pericardial effusion.  MEDIASTINUM AND LAKE: No lymphadenopathy.  CHEST WALL AND LOWER NECK: Within normal limits.    ABDOMEN AND PELVIS:  LIVER: Within normal limits.  BILE DUCTS: Normal caliber.  GALLBLADDER: Within normal limits.  SPLEEN: Within normal limits.  PANCREAS: Again is noted moderate pancreatic atrophy with extensive   dystrophic calcification throughout, compatible with chronic   pancreatitis. There is severe dilatation of the pancreatic duct within   the body and tail secondary to creations in the pancreatic duct. There is   no peripancreatic stranding or fluid collection.  ADRENALS: Within normal limits.  KIDNEYS/URETERS: The right kidney is moderately atrophic. Small left  renal cyst. Otherwise, within normal limits.    BLADDER: Within normal limits.  REPRODUCTIVE ORGANS: The prostate is prominent.    BOWEL: There is a moderate hiatal hernia. There is moderate concentric   low-attenuation wall thickening of the esophagus with prominent mucosal   enhancement, suggestive of use esophagitis. No bowel obstruction.   Appendix is normal.  PERITONEUM: No ascites.  VESSELS: Within normal limits.  RETROPERITONEUM/LYMPH NODES: No lymphadenopathy.  ABDOMINAL WALL: Within normal limits.  BONES: Within normal limits.    IMPRESSION: No evidence of aortic dissection. Hiatal hernia with diffuse   esophagitis. Chronic pancreatitis.    --- End of Report ---            REYNALDO SPRAGUE MD; Attending Radiologist  This document has beenelectronically signed. Dec 20 2021  4:17PM    < end of copied text >

## 2021-12-21 NOTE — CONSULT NOTE ADULT - SUBJECTIVE AND OBJECTIVE BOX
Patient chart reviewed, full consult to follow.   Patient known to me; YUNIEL HD dependent in the past; recovery with CKD 4    Thank you for the courtesy of this consultation. [facility] NEPHROLOGY SERVICES, [facility]  NEPHROLOGY AND HYPERTENSION  [address]  [address]  [place] [address]  [phone]    MD HALIE OLIVIA MD ANDREY GONCHARUK, MD MADHU KORRAPATI, MD YELENA ROSENBERG, MD BINNY KOSHY, MD CHRISTOPHER CAPUTO, MD EDWARD BOVER, MD      Information from chart:  "Patient is a 61y old  Male who presents with a chief complaint of abd pain (21 Dec 2021 15:54)    HPI:    61 yr  m,    h/o gout,  ex ETOH abuse, chronic pancreatitis, anemia, , CKD  takes  no meds  ep C s/p treatment, prior gastric perforation with peritonitis    resenting with lower   and  epigastic  pain . worsening  for 1 year.     patient  is  a poor historian,   denies any recent etoh useept known to renal (20 Dec 2021 18:23)   "    Patient in no distress      PAST MEDICAL & SURGICAL HISTORY:  ETOH abuse    Pancreatitis    Hepatitis C  treated    Gout    No significant past surgical history      FAMILY HISTORY:  No pertinent family history in first degree relatives      Allergies    No Known Allergies    Intolerances      Home Medications:  amLODIPine 10 mg oral tablet: 1 tab(s) orally once a day (20 Dec 2021 19:24)  calcium acetate 667 mg oral tablet: 1 tab(s) orally 3 times a day (20 Dec 2021 19:24)    MEDICATIONS  (STANDING):  heparin   Injectable 5000 Unit(s) SubCutaneous every 12 hours  metoclopramide 5 milliGRAM(s) Oral two times a day before meals  pancrelipase  (CREON  6,000 Lipase Units) 2 Capsule(s) Oral three times a day with meals  pantoprazole  Injectable 40 milliGRAM(s) IV Push daily  sodium chloride 0.9%. 1000 milliLiter(s) (100 mL/Hr) IV Continuous <Continuous>    MEDICATIONS  (PRN):  acetaminophen     Tablet .. 650 milliGRAM(s) Oral every 6 hours PRN Temp greater or equal to 38C (100.4F), Mild Pain (1 - 3)  HYDROmorphone  Injectable 0.5 milliGRAM(s) IV Push every 12 hours PRN Moderate Pain (4 - 6)  ondansetron Injectable 4 milliGRAM(s) IV Push every 6 hours PRN Vomiting    Vital Signs Last 24 Hrs  T(C): 37 (21 Dec 2021 17:25), Max: 37 (21 Dec 2021 17:25)  T(F): 98.6 (21 Dec 2021 17:25), Max: 98.6 (21 Dec 2021 17:)  HR: 72 (21 Dec 2021 17:) (72 - 88)  BP: 103/55 (21 Dec 2021 17:25) (100/64 - 111/60)  BP(mean): --  RR: 20 (21 Dec 2021 17:) (18 - 20)  SpO2: 99% (21 Dec 2021 17:) (98% - 100%)    Daily     Daily     21 @ 07:01  -  21 @ 07:00  --------------------------------------------------------  IN: 600 mL / OUT: 475 mL / NET: 125 mL    21 @ 07:01  -  21 @ 23:06  --------------------------------------------------------  IN: 1650 mL / OUT: 0 mL / NET: 1650 mL      CAPILLARY BLOOD GLUCOSE        PHYSICAL EXAM:      T(C): 37 (21 @ 17:25), Max: 37 (21 @ 17:25)  HR: 72 (21 @ 17:25) (72 - 88)  BP: 103/55 (21 @ 17:25) (100/64 - 111/60)  RR: 20 (21 @ 17:25) (18 - 20)  SpO2: 99% (21 @ 17:25) (98% - 100%)  Wt(kg): --  Lungs clear  Heart S1S2  Abd soft NT ND  Extremities:   tr edema                  137  |  106  |  86<H>  ----------------------------<  110<H>  3.1<L>   |  23  |  5.72<H>    Ca    8.3<L>      21 Dec 2021 07:00    TPro  7.7  /  Alb  3.0<L>  /  TBili  0.2  /  DBili  x   /  AST  15  /  ALT  10<L>  /  AlkPhos  162<H>  12-20                          7.8    12.30 )-----------( 510      ( 21 Dec 2021 07:00 )             25.9     Creatinine Trend: 5.72<--, 6.28<--  Urinalysis Basic - ( 20 Dec 2021 19:52 )    Color: Yellow / Appearance: Clear / S.010 / pH: x  Gluc: x / Ketone: Negative  / Bili: Negative / Urobili: Negative mg/dL   Blood: x / Protein: 30 mg/dL / Nitrite: Negative   Leuk Esterase: Negative / RBC: x / WBC 0-2   Sq Epi: x / Non Sq Epi: Occasional / Bacteria: Few            Assessment   Patient known to me; YUNIEL HD dependent in the past; recovery with CKD 4      Plan  Maintenance IVF  Will follow course;     Austin Dukes MD               Thank you for the courtesy of this consultation.

## 2021-12-21 NOTE — PROGRESS NOTE ADULT - ASSESSMENT
60 y/o M with hx of ETOH abuse, chronic pancreatitis, Hepatitis C, Gout, hx of gastric perforation in 2019, and hx of CKD with prior HD presents with nausea/vomiting admitted to medical for acute on chronic pancreatitis.    Abdominal pain 2/2 Diffuse esophagitis and chronic pancreatitis  NPO  PRN pain regimen  IVF  GI consult  PPI IV  Zofran PRN nausea    HypoKalemia  PRN repletion    Anemia  H/H: 8.7 --> 7.8  Type and screen  Tranfuse <7  F/U iron panel    Right kidney atropic, small left renal cyst, YUNIEL on CKD   Nephro consult  Adjust all meds as per CrCl  F/U US kdiney    Hepatitis C  Viral load  Not on meds currently  unknown treatment course    DVT PPX  heparin SQ

## 2021-12-21 NOTE — PROGRESS NOTE ADULT - SUBJECTIVE AND OBJECTIVE BOX
Patient is a 61y old  Male who presents with a chief complaint of abd pain (21 Dec 2021 10:06)    INTERVAL HPI/OVERNIGHT EVENTS: Patients seen and examined at bedside this morning. No acute events overnight. Pt reports having nausea and 2 episodes of non-billious vomiting this morning. Denies fevers. States has been having hiccups for months. Chronic abdominal pain and generalized pain not resolving. Denies SOB.    MEDICATIONS  (STANDING):  heparin   Injectable 5000 Unit(s) SubCutaneous every 12 hours  pantoprazole  Injectable 40 milliGRAM(s) IV Push daily  potassium chloride  20 mEq/100 mL IVPB 20 milliEquivalent(s) IV Intermittent every 2 hours  sodium chloride 0.9%. 1000 milliLiter(s) (100 mL/Hr) IV Continuous <Continuous>    MEDICATIONS  (PRN):  HYDROmorphone  Injectable 0.5 milliGRAM(s) IV Push every 12 hours PRN Moderate Pain (4 - 6)  ondansetron Injectable 4 milliGRAM(s) IV Push every 6 hours PRN Vomiting    Allergies    No Known Allergies    Intolerances      REVIEW OF SYSTEMS:  All other systems reviewed and are negative    Vital Signs Last 24 Hrs  T(C): 36.6 (21 Dec 2021 11:27), Max: 36.9 (21 Dec 2021 05:13)  T(F): 97.9 (21 Dec 2021 11:27), Max: 98.4 (21 Dec 2021 05:13)  HR: 82 (21 Dec 2021 11:27) (79 - 88)  BP: 100/64 (21 Dec 2021 11:27) (100/64 - 117/64)  BP(mean): --  RR: 18 (21 Dec 2021 11:27) (16 - 18)  SpO2: 98% (21 Dec 2021 11:27) (96% - 100%)  Daily Height in cm: 185.42 (20 Dec 2021 20:28)    Daily   I&O's Summary    20 Dec 2021 07:01  -  21 Dec 2021 07:00  --------------------------------------------------------  IN: 600 mL / OUT: 475 mL / NET: 125 mL      CAPILLARY BLOOD GLUCOSE        PHYSICAL EXAM:  GENERAL: NAD, well-groomed, well-developed  HEAD:  Atraumatic, Normocephalic  EYES: EOMI, PERRLA, conjunctiva and sclera clear  ENMT: No tonsillar erythema, exudates, or enlargement; Moist mucous membranes, Good dentition, No lesions  NECK: Supple, No JVD, Normal thyroid  NERVOUS SYSTEM:  Alert & Oriented X3, Good concentration; Motor Strength 5/5 B/L upper and lower extremities; DTRs 2+ intact and symmetric  CHEST/LUNG: Clear to percussion bilaterally; No rales, rhonchi, wheezing, or rubs  HEART: Regular rate and rhythm; No murmurs, rubs, or gallops  ABDOMEN: Soft, Nontender, Nondistended; Bowel sounds present  EXTREMITIES:  2+ Peripheral Pulses, No clubbing, cyanosis, or edema  LYMPH: No lymphadenopathy noted  SKIN: No rashes or lesions    Labs                          7.8    12.30 )-----------( 510      ( 21 Dec 2021 07:00 )             25.9     12-21    137  |  106  |  86<H>  ----------------------------<  110<H>  3.1<L>   |  23  |  5.72<H>    Ca    8.3<L>      21 Dec 2021 07:00    TPro  7.7  /  Alb  3.0<L>  /  TBili  0.2  /  DBili  x   /  AST  15  /  ALT  10<L>  /  AlkPhos  162<H>  12-20          Urinalysis Basic - ( 20 Dec 2021 19:52 )    Color: Yellow / Appearance: Clear / S.010 / pH: x  Gluc: x / Ketone: Negative  / Bili: Negative / Urobili: Negative mg/dL   Blood: x / Protein: 30 mg/dL / Nitrite: Negative   Leuk Esterase: Negative / RBC: x / WBC 0-2   Sq Epi: x / Non Sq Epi: Occasional / Bacteria: Few

## 2021-12-22 ENCOUNTER — TRANSCRIPTION ENCOUNTER (OUTPATIENT)
Age: 61
End: 2021-12-22

## 2021-12-22 LAB
ALBUMIN SERPL ELPH-MCNC: 2.5 G/DL — LOW (ref 3.3–5)
ALP SERPL-CCNC: 123 U/L — HIGH (ref 40–120)
ALT FLD-CCNC: 11 U/L — LOW (ref 12–78)
ANION GAP SERPL CALC-SCNC: 9 MMOL/L — SIGNIFICANT CHANGE UP (ref 5–17)
AST SERPL-CCNC: 9 U/L — LOW (ref 15–37)
BILIRUB SERPL-MCNC: 0.1 MG/DL — LOW (ref 0.2–1.2)
BLD GP AB SCN SERPL QL: SIGNIFICANT CHANGE UP
BUN SERPL-MCNC: 76 MG/DL — HIGH (ref 7–23)
CALCIUM SERPL-MCNC: 8.2 MG/DL — LOW (ref 8.5–10.1)
CHLORIDE SERPL-SCNC: 110 MMOL/L — HIGH (ref 96–108)
CO2 SERPL-SCNC: 19 MMOL/L — LOW (ref 22–31)
CREAT SERPL-MCNC: 5.68 MG/DL — HIGH (ref 0.5–1.3)
FERRITIN SERPL-MCNC: 15 NG/ML — LOW (ref 30–400)
GLUCOSE SERPL-MCNC: 109 MG/DL — HIGH (ref 70–99)
HAV IGM SER-ACNC: SIGNIFICANT CHANGE UP
HBV CORE IGM SER-ACNC: SIGNIFICANT CHANGE UP
HBV SURFACE AG SER-ACNC: SIGNIFICANT CHANGE UP
HCT VFR BLD CALC: 22.4 % — LOW (ref 39–50)
HCV AB S/CO SERPL IA: 7.69 S/CO — HIGH (ref 0–0.99)
HCV AB SERPL-IMP: REACTIVE
HGB BLD-MCNC: 6.6 G/DL — CRITICAL LOW (ref 13–17)
IRON SATN MFR SERPL: 15 UG/DL — LOW (ref 45–165)
IRON SATN MFR SERPL: 5 % — LOW (ref 16–55)
MAGNESIUM SERPL-MCNC: 2.9 MG/DL — HIGH (ref 1.6–2.6)
MCHC RBC-ENTMCNC: 22 PG — LOW (ref 27–34)
MCHC RBC-ENTMCNC: 29.5 GM/DL — LOW (ref 32–36)
MCV RBC AUTO: 74.7 FL — LOW (ref 80–100)
NRBC # BLD: 0 /100 WBCS — SIGNIFICANT CHANGE UP (ref 0–0)
PHOSPHATE SERPL-MCNC: 3 MG/DL — SIGNIFICANT CHANGE UP (ref 2.5–4.5)
PLATELET # BLD AUTO: 421 K/UL — HIGH (ref 150–400)
POTASSIUM SERPL-MCNC: 3.8 MMOL/L — SIGNIFICANT CHANGE UP (ref 3.5–5.3)
POTASSIUM SERPL-SCNC: 3.8 MMOL/L — SIGNIFICANT CHANGE UP (ref 3.5–5.3)
PROT SERPL-MCNC: 6.2 GM/DL — SIGNIFICANT CHANGE UP (ref 6–8.3)
RBC # BLD: 3 M/UL — LOW (ref 4.2–5.8)
RBC # FLD: 16.2 % — HIGH (ref 10.3–14.5)
SODIUM SERPL-SCNC: 138 MMOL/L — SIGNIFICANT CHANGE UP (ref 135–145)
TIBC SERPL-MCNC: 318 UG/DL — SIGNIFICANT CHANGE UP (ref 220–430)
UIBC SERPL-MCNC: 304 UG/DL — SIGNIFICANT CHANGE UP (ref 110–370)
WBC # BLD: 12.16 K/UL — HIGH (ref 3.8–10.5)
WBC # FLD AUTO: 12.16 K/UL — HIGH (ref 3.8–10.5)

## 2021-12-22 PROCEDURE — 99233 SBSQ HOSP IP/OBS HIGH 50: CPT

## 2021-12-22 PROCEDURE — 83020 HEMOGLOBIN ELECTROPHORESIS: CPT | Mod: 26

## 2021-12-22 RX ORDER — HYDROMORPHONE HYDROCHLORIDE 2 MG/ML
1 INJECTION INTRAMUSCULAR; INTRAVENOUS; SUBCUTANEOUS EVERY 4 HOURS
Refills: 0 | Status: DISCONTINUED | OUTPATIENT
Start: 2021-12-22 | End: 2021-12-23

## 2021-12-22 RX ORDER — PANTOPRAZOLE SODIUM 20 MG/1
40 TABLET, DELAYED RELEASE ORAL
Refills: 0 | Status: DISCONTINUED | OUTPATIENT
Start: 2021-12-22 | End: 2021-12-23

## 2021-12-22 RX ORDER — TAMSULOSIN HYDROCHLORIDE 0.4 MG/1
0.4 CAPSULE ORAL AT BEDTIME
Refills: 0 | Status: DISCONTINUED | OUTPATIENT
Start: 2021-12-22 | End: 2021-12-23

## 2021-12-22 RX ORDER — SODIUM BICARBONATE 1 MEQ/ML
650 SYRINGE (ML) INTRAVENOUS EVERY 8 HOURS
Refills: 0 | Status: DISCONTINUED | OUTPATIENT
Start: 2021-12-22 | End: 2021-12-23

## 2021-12-22 RX ORDER — IRON SUCROSE 20 MG/ML
200 INJECTION, SOLUTION INTRAVENOUS ONCE
Refills: 0 | Status: COMPLETED | OUTPATIENT
Start: 2021-12-22 | End: 2021-12-22

## 2021-12-22 RX ADMIN — Medication 650 MILLIGRAM(S): at 12:05

## 2021-12-22 RX ADMIN — PANTOPRAZOLE SODIUM 40 MILLIGRAM(S): 20 TABLET, DELAYED RELEASE ORAL at 17:39

## 2021-12-22 RX ADMIN — Medication 650 MILLIGRAM(S): at 21:39

## 2021-12-22 RX ADMIN — Medication 2 CAPSULE(S): at 16:38

## 2021-12-22 RX ADMIN — HYDROMORPHONE HYDROCHLORIDE 1 MILLIGRAM(S): 2 INJECTION INTRAMUSCULAR; INTRAVENOUS; SUBCUTANEOUS at 21:13

## 2021-12-22 RX ADMIN — HYDROMORPHONE HYDROCHLORIDE 0.5 MILLIGRAM(S): 2 INJECTION INTRAMUSCULAR; INTRAVENOUS; SUBCUTANEOUS at 11:48

## 2021-12-22 RX ADMIN — HYDROMORPHONE HYDROCHLORIDE 1 MILLIGRAM(S): 2 INJECTION INTRAMUSCULAR; INTRAVENOUS; SUBCUTANEOUS at 14:55

## 2021-12-22 RX ADMIN — HEPARIN SODIUM 5000 UNIT(S): 5000 INJECTION INTRAVENOUS; SUBCUTANEOUS at 05:13

## 2021-12-22 RX ADMIN — TAMSULOSIN HYDROCHLORIDE 0.4 MILLIGRAM(S): 0.4 CAPSULE ORAL at 21:38

## 2021-12-22 RX ADMIN — HYDROMORPHONE HYDROCHLORIDE 1 MILLIGRAM(S): 2 INJECTION INTRAMUSCULAR; INTRAVENOUS; SUBCUTANEOUS at 12:18

## 2021-12-22 RX ADMIN — Medication 5 MILLIGRAM(S): at 16:37

## 2021-12-22 RX ADMIN — HYDROMORPHONE HYDROCHLORIDE 0.5 MILLIGRAM(S): 2 INJECTION INTRAMUSCULAR; INTRAVENOUS; SUBCUTANEOUS at 12:18

## 2021-12-22 RX ADMIN — HYDROMORPHONE HYDROCHLORIDE 1 MILLIGRAM(S): 2 INJECTION INTRAMUSCULAR; INTRAVENOUS; SUBCUTANEOUS at 19:52

## 2021-12-22 RX ADMIN — Medication 2 CAPSULE(S): at 08:36

## 2021-12-22 RX ADMIN — IRON SUCROSE 110 MILLIGRAM(S): 20 INJECTION, SOLUTION INTRAVENOUS at 12:06

## 2021-12-22 RX ADMIN — PANTOPRAZOLE SODIUM 40 MILLIGRAM(S): 20 TABLET, DELAYED RELEASE ORAL at 11:09

## 2021-12-22 RX ADMIN — Medication 2 CAPSULE(S): at 11:14

## 2021-12-22 RX ADMIN — Medication 5 MILLIGRAM(S): at 08:38

## 2021-12-22 NOTE — PROGRESS NOTE ADULT - SUBJECTIVE AND OBJECTIVE BOX
[facility] NEPHROLOGY SERVICES, [facility]  NEPHROLOGY AND HYPERTENSION  [address]  [address]  [place] [address]  [phone]    MD HALIE OLIVIA MD ANDREY GONCHARUK, MD MADHU KORRAPATI, MD YELENA ROSENBERG, MD BINNY KOSHY, MD CHRISTOPHER CAPUTO, MD JANICE ARREOLA MD          Patient events noted  Still with hiccups   non specific abd pain     MEDICATIONS  (STANDING):  metoclopramide 5 milliGRAM(s) Oral two times a day before meals  pancrelipase  (CREON  6,000 Lipase Units) 2 Capsule(s) Oral three times a day with meals  pantoprazole  Injectable 40 milliGRAM(s) IV Push two times a day  sodium bicarbonate 650 milliGRAM(s) Oral every 8 hours  sodium chloride 0.9%. 1000 milliLiter(s) (100 mL/Hr) IV Continuous <Continuous>  tamsulosin 0.4 milliGRAM(s) Oral at bedtime    MEDICATIONS  (PRN):  acetaminophen     Tablet .. 650 milliGRAM(s) Oral every 6 hours PRN Temp greater or equal to 38C (100.4F), Mild Pain (1 - 3)  HYDROmorphone  Injectable 1 milliGRAM(s) IV Push every 4 hours PRN Severe Pain (7 - 10)  LORazepam   Injectable 2 milliGRAM(s) IV Push every 2 hours PRN CIWA-Ar score 8 or greater  ondansetron Injectable 4 milliGRAM(s) IV Push every 6 hours PRN Vomiting      12-21-21 @ 07:01  -  12-22-21 @ 07:00  --------------------------------------------------------  IN: 3090 mL / OUT: 1000 mL / NET: 2090 mL      PHYSICAL EXAM:      T(C): 36.7 (12-22-21 @ 20:45), Max: 36.8 (12-21-21 @ 23:49)  HR: 76 (12-22-21 @ 20:45) (76 - 97)  BP: 115/64 (12-22-21 @ 20:45) (96/46 - 115/64)  RR: 18 (12-22-21 @ 20:45) (16 - 19)  SpO2: 98% (12-22-21 @ 20:45) (98% - 100%)  Wt(kg): --  Lungs clear  Heart S1S2  Abd soft NT ND  Extremities:   tr edema                                    6.6    12.16 )-----------( 421      ( 22 Dec 2021 07:24 )             22.4     12-22    138  |  110<H>  |  76<H>  ----------------------------<  109<H>  3.8   |  19<L>  |  5.68<H>    Ca    8.2<L>      22 Dec 2021 07:24  Phos  3.0     12-22  Mg     2.9     12-22    TPro  6.2  /  Alb  2.5<L>  /  TBili  0.1<L>  /  DBili  x   /  AST  9<L>  /  ALT  11<L>  /  AlkPhos  123<H>  12-22      LIVER FUNCTIONS - ( 22 Dec 2021 07:24 )  Alb: 2.5 g/dL / Pro: 6.2 gm/dL / ALK PHOS: 123 U/L / ALT: 11 U/L / AST: 9 U/L / GGT: x           Creatinine Trend: 5.68<--, 5.72<--, 6.28<--        Assessment   Patient known to me; YUNIEL HD dependent in the past; recovery with CKD 4      Plan  Maintenance IVF  PRBC  Will follow course;       Austin Dukes MD

## 2021-12-22 NOTE — DIETITIAN INITIAL EVALUATION ADULT. - DIET TYPE
When/if medically feasible recommend low fat, low sodium + Suplena x 2/day (provides 850 kcal, 22 g protein)

## 2021-12-22 NOTE — CHART NOTE - NSCHARTNOTEFT_GEN_A_CORE
Blood Transfusion Consent  Medicine Hospitalist PA    Explained to patient regarding the need for blood transfusion. The risk and benefits were discussed, the risk including fever, chills, lower back pain, allergic reaction, and even death were explained. Patient verbalized the understanding and consent was obtained.

## 2021-12-22 NOTE — DIETITIAN INITIAL EVALUATION ADULT. - PERTINENT LABORATORY DATA
12-22 Na138 mmol/L Glu 109 mg/dL<H> K+ 3.8 mmol/L Cr  5.68 mg/dL<H> BUN 76 mg/dL<H> 12-22 Phos 3.0 mg/dL 12-22 Alb 2.5 g/dL<L>

## 2021-12-22 NOTE — PROGRESS NOTE ADULT - ASSESSMENT
HPI:    61 yr  m,    h/o gout,  ex ETOH abuse, chronic pancreatitis, anemia, , CKD  takes  no meds  ep C s/p treatment, prior gastric perforation with peritonitis    resenting with lower   and  epigastic  pain . worsening  for 1 year.     patient  is  a poor historian,   denies any recent etoh useept known to renal (20 Dec 2021 18:23)  ------- As Above ------------- Patient is a poor historian ( as is his mom )   The patient presents with N/V and abdominal pain x 3 days although mom / chart states this has a been a longer problem. History of ETOH abuse but has stopped. They deny  NSAIDs  Patient is not feeling any better over the past 24 hours. CT scan c/w chronic pancreatitis. Patient found to have significant elevated creatinine  States that he had a colonoscopy 3 years ago. History of hepatitis C that was treated.     A) N/V, abdominal pain - Coughing --> vomiting  - Still R/O PUD, Uremia, chronic pancreatitis - 1) Nephrology f/u  2) EGD  3) PPI 4) Zofran PRN 5) abdominal sonogram 6) Creon 7) clear liquid diet  B) Fe def anemia -  getting iron - Will need EGD  / colonoscopy when medically optimized  C) history of hepatitis C  - 1) Bloods ordered.

## 2021-12-22 NOTE — DIETITIAN INITIAL EVALUATION ADULT. - PERTINENT MEDS FT
MEDICATIONS  (STANDING):  metoclopramide 5 milliGRAM(s) Oral two times a day before meals  pancrelipase  (CREON  6,000 Lipase Units) 2 Capsule(s) Oral three times a day with meals  pantoprazole  Injectable 40 milliGRAM(s) IV Push two times a day  sodium bicarbonate 650 milliGRAM(s) Oral every 8 hours  sodium chloride 0.9%. 1000 milliLiter(s) (100 mL/Hr) IV Continuous <Continuous>  tamsulosin 0.4 milliGRAM(s) Oral at bedtime    MEDICATIONS  (PRN):  acetaminophen     Tablet .. 650 milliGRAM(s) Oral every 6 hours PRN Temp greater or equal to 38C (100.4F), Mild Pain (1 - 3)  HYDROmorphone  Injectable 1 milliGRAM(s) IV Push every 4 hours PRN Severe Pain (7 - 10)  LORazepam   Injectable 2 milliGRAM(s) IV Push every 2 hours PRN CIWA-Ar score 8 or greater  ondansetron Injectable 4 milliGRAM(s) IV Push every 6 hours PRN Vomiting

## 2021-12-22 NOTE — DIETITIAN INITIAL EVALUATION ADULT. - OTHER INFO
Pt in pain at time of visit- states he was just given pain medication- limited information obtained. Pt currently on clears- MD reports pt not fully tolerating yet so he will remain on clears today (12/22). States he is hungry. Denies history difficulty chewing/swallowing. Reports weight loss over long period of time due to his medical issues. States he used to weigh  pounds. Weight loss as noted below.   Pt educated briefly on possible progression of diet. Made aware RD remains available.

## 2021-12-22 NOTE — PROGRESS NOTE ADULT - SUBJECTIVE AND OBJECTIVE BOX
Patient is a 61y old  Male who presents with a chief complaint of abd pain (21 Dec 2021 15:54)      HPI:    61 yr  m,    h/o gout,  ex ETOH abuse, chronic pancreatitis, anemia, , CKD  takes  no meds  ep C s/p treatment, prior gastric perforation with peritonitis    resenting with lower   and  epigastic  pain . worsening  for 1 year.     patient  is  a poor historian,   denies any recent etoh useept known to renal (20 Dec 2021 18:23)      INTERVAL HPI/OVERNIGHT EVENTS:  Coughing --> vomiting , pain in stomach  / back / sides - worse compared to yesterday (?). No H    MEDICATIONS  (STANDING):  metoclopramide 5 milliGRAM(s) Oral two times a day before meals  pancrelipase  (CREON  6,000 Lipase Units) 2 Capsule(s) Oral three times a day with meals  pantoprazole  Injectable 40 milliGRAM(s) IV Push two times a day  sodium bicarbonate 650 milliGRAM(s) Oral every 8 hours  sodium chloride 0.9%. 1000 milliLiter(s) (100 mL/Hr) IV Continuous <Continuous>  tamsulosin 0.4 milliGRAM(s) Oral at bedtime    MEDICATIONS  (PRN):  acetaminophen     Tablet .. 650 milliGRAM(s) Oral every 6 hours PRN Temp greater or equal to 38C (100.4F), Mild Pain (1 - 3)  HYDROmorphone  Injectable 1 milliGRAM(s) IV Push every 4 hours PRN Severe Pain (7 - 10)  LORazepam   Injectable 2 milliGRAM(s) IV Push every 2 hours PRN CIWA-Ar score 8 or greater  ondansetron Injectable 4 milliGRAM(s) IV Push every 6 hours PRN Vomiting      FAMILY HISTORY:  No pertinent family history in first degree relatives        Allergies    No Known Allergies    Intolerances        PMH/PSH:  ETOH abuse    Pancreatitis    Hepatitis C    Gout    No significant past surgical history          REVIEW OF SYSTEMS:  CONSTITUTIONAL: No fever, weight loss,  EYES: No eye pain, visual disturbances, or discharge  ENMT:  No difficulty hearing, tinnitus, vertigo; No sinus or throat pain  NECK: No pain or stiffness  BREASTS: No pain, masses, or nipple discharge  RESPIRATORY: No cough, wheezing, chills or hemoptysis; No shortness of breath  CARDIOVASCULAR: No chest pain, palpitations, dizziness, or leg swelling  GASTROINTESTINAL:  See above   GENITOURINARY: No dysuria, frequency, hematuria, or incontinence  NEUROLOGICAL: No headaches, memory loss, loss of strength, numbness, or tremors  SKIN: No itching, burning, rashes, or lesions   LYMPH NODES: No enlarged glands  ENDOCRINE: No heat or cold intolerance; No hair loss  MUSCULOSKELETAL: No joint pain or swelling; No muscle, back, or extremity pain  PSYCHIATRIC: No depression, anxiety, mood swings, or difficulty sleeping  HEME/LYMPH: No easy bruising, or bleeding gums  ALLERGY AND IMMUNOLOGIC: No hives or eczema    Vital Signs Last 24 Hrs  T(C): 36.8 (22 Dec 2021 12:19), Max: 37 (21 Dec 2021 17:25)  T(F): 98.2 (22 Dec 2021 12:), Max: 98.6 (21 Dec 2021 17:25)  HR: 83 (22 Dec 2021 12:19) (72 - 97)  BP: 107/64 (22 Dec 2021 12:19) (102/57 - 107/64)  BP(mean): --  RR: 18 (22 Dec 2021 12:19) (18 - 20)  SpO2: 100% (22 Dec 2021 12:19) (99% - 100%)    PHYSICAL EXAM:  GENERAL: NAD, well-groomed, well-developed  HEAD:  Atraumatic, Normocephalic  EYES: EOMI, PERRLA, conjunctiva and sclera clear  NECK: Supple, No JVD, Normal thyroid  NERVOUS SYSTEM:  Alert & Oriented X3, Good concentration; Motor Strength 5/5 B/L upper and lower extremities;   CHEST/LUNG: Clear to percussion bilaterally; No rales, rhonchi, wheezing, or rubs  HEART: Regular rate and rhythm; No murmurs, rubs, or gallops  ABDOMEN: Soft, Nontender, Nondistended; Bowel sounds present  EXTREMITIES:  2+ Peripheral Pulses, No clubbing, cyanosis, or edema  LYMPH: No lymphadenopathy noted  SKIN: No rashes or lesions    LAB  - @ 15:21  amylase --   lipase 361                           6.6    12.16 )-----------( 421      ( 22 Dec 2021 07:24 )             22.4       CBC:  - @ 07:24  WBC 12.16   Hgb 6.6   Hct 22.4   Plts 421  MCV 74.7   @ 07:00  WBC 12.30   Hgb 7.8   Hct 25.9   Plts 510  MCV 74.0   @ 15:21  WBC 10.46   Hgb 8.7   Hct 28.7   Plts 559  MCV 73.4      Chemistry:   @ 07:24  Na+ 138  K+ 3.8  Cl- 110  CO2 19  BUN 76  Cr 5.68      @ 07:00  Na+ 137  K+ 3.1  Cl- 106  CO2 23  BUN 86  Cr 5.72     12-20 @ 15:21  Na+ 134  K+ 3.2  Cl- 100  CO2 21  BUN 90  Cr 6.28         Glucose, Serum: 109 mg/dL ( @ 07:24)  Glucose, Serum: 110 mg/dL ( @ 07:00)  Glucose, Serum: 108 mg/dL ( @ 15:21)      22 Dec 2021 07:24    138    |  110    |  76     ----------------------------<  109    3.8     |  19     |  5.68   21 Dec 2021 07:00    137    |  106    |  86     ----------------------------<  110    3.1     |  23     |  5.72   20 Dec 2021 15:21    134    |  100    |  90     ----------------------------<  108    3.2     |  21     |  6.28     Ca    8.2        22 Dec 2021 07:24  Ca    8.3        21 Dec 2021 07:00  Ca    9.0        20 Dec 2021 15:21  Phos  3.0       22 Dec 2021 07:24  Mg     2.9       22 Dec 2021 07:24    TPro  6.2    /  Alb  2.5    /  TBili  0.1    /  DBili  x      /  AST  9      /  ALT  11     /  AlkPhos  123    22 Dec 2021 07:24  TPro  7.7    /  Alb  3.0    /  TBili  0.2    /  DBili  x      /  AST  15     /  ALT  10     /  AlkPhos  162    20 Dec 2021 15:21          Urinalysis Basic - ( 20 Dec 2021 19:52 )    Color: Yellow / Appearance: Clear / S.010 / pH: x  Gluc: x / Ketone: Negative  / Bili: Negative / Urobili: Negative mg/dL   Blood: x / Protein: 30 mg/dL / Nitrite: Negative   Leuk Esterase: Negative / RBC: x / WBC 0-2   Sq Epi: x / Non Sq Epi: Occasional / Bacteria: Few        CAPILLARY BLOOD GLUCOSE          C-Reactive Protein, Serum: <3 mg/L ( @ 02:29)      RADIOLOGY & ADDITIONAL TESTS:    Imaging Personally Reviewed:  [ ] YES  [ ] NO    Consultant(s) Notes Reviewed:  [ ] YES  [ ] NO    Care Discussed with Consultants/Other Providers [ ] YES  [ ] NO

## 2021-12-22 NOTE — PROGRESS NOTE ADULT - SUBJECTIVE AND OBJECTIVE BOX
Patient is a 61y old  Male who presents with a chief complaint of abd pain (21 Dec 2021 10:06)    INTERVAL HPI/OVERNIGHT EVENTS: Patients seen and examined at bedside this morning. No acute events overnight.   unable to tolerate clear liquid, states esophageal pain   denies vomiting. spitting up clear sputum into a bin     Denies fever, chills, dizziness, HA, cough, CP, palpitations, SOB, dysuria.     MEDICATIONS  (STANDING):  heparin   Injectable 5000 Unit(s) SubCutaneous every 12 hours  pantoprazole  Injectable 40 milliGRAM(s) IV Push daily  potassium chloride  20 mEq/100 mL IVPB 20 milliEquivalent(s) IV Intermittent every 2 hours  sodium chloride 0.9%. 1000 milliLiter(s) (100 mL/Hr) IV Continuous <Continuous>    MEDICATIONS  (PRN):  HYDROmorphone  Injectable 0.5 milliGRAM(s) IV Push every 12 hours PRN Moderate Pain (4 - 6)  ondansetron Injectable 4 milliGRAM(s) IV Push every 6 hours PRN Vomiting    Allergies    No Known Allergies    Intolerances      Vital Signs Last 24 Hrs  T(C): 36.5 (22 Dec 2021 23:44), Max: 36.8 (22 Dec 2021 12:19)  T(F): 97.7 (22 Dec 2021 23:44), Max: 98.2 (22 Dec 2021 12:19)  HR: 82 (22 Dec 2021 23:44) (76 - 97)  BP: 103/59 (22 Dec 2021 23:44) (96/46 - 115/64)  BP(mean): --  RR: 18 (22 Dec 2021 23:44) (16 - 19)  SpO2: 100% (22 Dec 2021 23:44) (98% - 100%)      CAPILLARY BLOOD GLUCOSE        PHYSICAL EXAM:  GENERAL: NAD, no increased WOB   HEAD:  Atraumatic, Normocephalic  EYES: EOMI, PERRLA, conjunctiva and sclera clear  ENMT: No tonsillar erythema, exudates, or enlargement;   NECK: Supple, No JVD,  NERVOUS SYSTEM:  Alert & Oriented X3, Good concentration; nonfocal   CHEST/LUNG: CTAB  HEART: Regular rate and rhythm; No murmurs, rubs, or gallops  ABDOMEN: Soft, Nontender, Nondistended; Bowel sounds present; midline old well heeled scar   EXTREMITIES:  2+ Peripheral Pulses b/l, No clubbing, cyanosis, calf tenderness or edema b/l   +chen with good output     Labs                          6.6    12.16 )-----------( 421      ( 22 Dec 2021 07:24 )             22.4   12-    138  |  110<H>  |  76<H>  ----------------------------<  109<H>  3.8   |  19<L>  |  5.68<H>    Ca    8.2<L>      22 Dec 2021 07:24  Phos  3.0       Mg     2.9         TPro  6.2  /  Alb  2.5<L>  /  TBili  0.1<L>  /  DBili  x   /  AST  9<L>  /  ALT  11<L>  /  AlkPhos  123<H>              Urinalysis Basic - ( 20 Dec 2021 19:52 )    Color: Yellow / Appearance: Clear / S.010 / pH: x  Gluc: x / Ketone: Negative  / Bili: Negative / Urobili: Negative mg/dL   Blood: x / Protein: 30 mg/dL / Nitrite: Negative   Leuk Esterase: Negative / RBC: x / WBC 0-2   Sq Epi: x / Non Sq Epi: Occasional / Bacteria: Few    Consultant(s) Notes Reveiwed [ x] Yes     Care Discussed with [x ] Consultants  [x ] Patient  [ ] Family  [x ] /   [x ] Other; RN

## 2021-12-22 NOTE — PROGRESS NOTE ADULT - ASSESSMENT
60 y/o M with hx of ETOH abuse, chronic pancreatitis, Hepatitis C, Gout, hx of gastric perforation in 2019, and hx of CKD with prior HD presents with nausea/vomiting admitted to medical for acute on chronic pancreatitis.    Assessment and Plan:     Abdominal pain 2/2 Diffuse esophagitis and chronic pancreatitis  clear liquid diet   PRN pain regimen  IVF  GI following   PPI IV bid   Zofran PRN nausea    HypoKalemia --repleted     Anemia, THO   1u PRBC today, repeat CBC post transfusion   no e/o bleeding or bruising   FOBT   venofer     Right kidney atropic, small left renal cyst, YUNIEL on CKD   Nephro following   Adjust all meds as per CrCl  F/U US kidney    Hepatitis C  Viral load  Not on meds currently  unknown treatment course    BPH  chen inserted in ER , will need TOV   flomax     DVT PPX  heparin SQ  fall precautions   PT  62 y/o M with hx of ETOH abuse, chronic pancreatitis, Hepatitis C, Gout, hx of gastric perforation in 2019, and hx of CKD with prior HD presents with nausea/vomiting admitted to medical for acute on chronic pancreatitis.    Assessment and Plan:     Abdominal pain 2/2 Diffuse esophagitis and chronic pancreatitis  clear liquid diet   PRN pain regimen  IVF  GI following   PPI IV bid   Zofran PRN nausea    HypoKalemia --repleted     Anemia, THO   1u PRBC today, repeat CBC post transfusion   no e/o bleeding or bruising   FOBT   venofer     Right kidney atropic, small left renal cyst, YUNIEL on CKD   Nephro following   Adjust all meds as per CrCl  F/U US kidney    Hepatitis C  Viral load  Not on meds currently  unknown treatment course    BPH  chen inserted in ER , will need TOV   flomax     DVT PPX  SCDs-dvt ppx  fall precautions   PT  60 y/o M with hx of ETOH abuse, chronic pancreatitis, Hepatitis C, Gout, hx of gastric perforation in 2019, and hx of CKD with prior HD presents with nausea/vomiting admitted to medical for acute on chronic pancreatitis.    Assessment and Plan:     Abdominal pain 2/2 Diffuse esophagitis and chronic pancreatitis  clear liquid diet   PRN pain regimen  IVF  GI following   PPI IV bid   Zofran PRN nausea    HypoKalemia --repleted     Anemia, THO   1u PRBC today, repeat CBC post transfusion   no e/o bleeding or bruising   FOBT   venofer     Right kidney atropic, small left renal cyst, YUNIEL on CKD   Nephro following   Adjust all meds as per CrCl  F/U US kidney    Hepatitis C  Viral load  Not on meds currently  unknown treatment course    BPH  chen inserted in ER , will need TOV   flomax     ETOH dependence , not in w/d   CIWA monitoring with symptom trigger ativan prn for CIWA >8   thiamine, MVI, folic acid     DVT PPX  SCDs-dvt ppx  fall precautions   PT

## 2021-12-22 NOTE — DIETITIAN NUTRITION RISK NOTIFICATION - TREATMENT: THE FOLLOWING DIET HAS BEEN RECOMMENDED
Diet, Clear Liquid:   For patients receiving Renal Replacement - No Protein Restr, No Conc K, No Conc Phos, Low Sodium (RENAL) (12-21-21 @ 16:00) [Active]

## 2021-12-22 NOTE — DIETITIAN INITIAL EVALUATION ADULT. - OTHER CALCULATIONS
Ht (cm): 185.4cm   Wt (kg): 61.4kg (dosing weight)   BMI: 17.9     IBW: 83.4kg +/- 10% %IBW: 73% UBW: 113.3kg %UBW: 54%

## 2021-12-23 ENCOUNTER — RESULT REVIEW (OUTPATIENT)
Age: 61
End: 2021-12-23

## 2021-12-23 LAB
AMYLASE P1 CFR SERPL: 309 U/L — HIGH (ref 25–115)
ANION GAP SERPL CALC-SCNC: 8 MMOL/L — SIGNIFICANT CHANGE UP (ref 5–17)
BUN SERPL-MCNC: 65 MG/DL — HIGH (ref 7–23)
CALCIUM SERPL-MCNC: 8.4 MG/DL — LOW (ref 8.5–10.1)
CHLORIDE SERPL-SCNC: 111 MMOL/L — HIGH (ref 96–108)
CO2 SERPL-SCNC: 21 MMOL/L — LOW (ref 22–31)
CREAT SERPL-MCNC: 4.75 MG/DL — HIGH (ref 0.5–1.3)
FLUAV AG NPH QL: SIGNIFICANT CHANGE UP
FLUBV AG NPH QL: SIGNIFICANT CHANGE UP
GLUCOSE SERPL-MCNC: 108 MG/DL — HIGH (ref 70–99)
HCT VFR BLD CALC: 26.7 % — LOW (ref 39–50)
HCT VFR BLD CALC: 28.1 % — LOW (ref 39–50)
HCV AB S/CO SERPL IA: 7.26 S/CO — HIGH (ref 0–0.99)
HCV AB SERPL-IMP: REACTIVE
HGB BLD-MCNC: 8 G/DL — LOW (ref 13–17)
HGB BLD-MCNC: 8.4 G/DL — LOW (ref 13–17)
MAGNESIUM SERPL-MCNC: 2.1 MG/DL — SIGNIFICANT CHANGE UP (ref 1.6–2.6)
MCHC RBC-ENTMCNC: 22.9 PG — LOW (ref 27–34)
MCHC RBC-ENTMCNC: 22.9 PG — LOW (ref 27–34)
MCHC RBC-ENTMCNC: 29.9 GM/DL — LOW (ref 32–36)
MCHC RBC-ENTMCNC: 30 GM/DL — LOW (ref 32–36)
MCV RBC AUTO: 76.5 FL — LOW (ref 80–100)
MCV RBC AUTO: 76.6 FL — LOW (ref 80–100)
NRBC # BLD: 0 /100 WBCS — SIGNIFICANT CHANGE UP (ref 0–0)
NRBC # BLD: 0 /100 WBCS — SIGNIFICANT CHANGE UP (ref 0–0)
PHOSPHATE SERPL-MCNC: 3.4 MG/DL — SIGNIFICANT CHANGE UP (ref 2.5–4.5)
PLATELET # BLD AUTO: 346 K/UL — SIGNIFICANT CHANGE UP (ref 150–400)
PLATELET # BLD AUTO: 395 K/UL — SIGNIFICANT CHANGE UP (ref 150–400)
POTASSIUM SERPL-MCNC: 3.7 MMOL/L — SIGNIFICANT CHANGE UP (ref 3.5–5.3)
POTASSIUM SERPL-SCNC: 3.7 MMOL/L — SIGNIFICANT CHANGE UP (ref 3.5–5.3)
RBC # BLD: 3.49 M/UL — LOW (ref 4.2–5.8)
RBC # BLD: 3.67 M/UL — LOW (ref 4.2–5.8)
RBC # FLD: 16.9 % — HIGH (ref 10.3–14.5)
RBC # FLD: 17.3 % — HIGH (ref 10.3–14.5)
SARS-COV-2 RNA SPEC QL NAA+PROBE: SIGNIFICANT CHANGE UP
SODIUM SERPL-SCNC: 140 MMOL/L — SIGNIFICANT CHANGE UP (ref 135–145)
WBC # BLD: 10.34 K/UL — SIGNIFICANT CHANGE UP (ref 3.8–10.5)
WBC # BLD: 9.65 K/UL — SIGNIFICANT CHANGE UP (ref 3.8–10.5)
WBC # FLD AUTO: 10.34 K/UL — SIGNIFICANT CHANGE UP (ref 3.8–10.5)
WBC # FLD AUTO: 9.65 K/UL — SIGNIFICANT CHANGE UP (ref 3.8–10.5)

## 2021-12-23 PROCEDURE — 99233 SBSQ HOSP IP/OBS HIGH 50: CPT

## 2021-12-23 PROCEDURE — 88312 SPECIAL STAINS GROUP 1: CPT | Mod: 26

## 2021-12-23 PROCEDURE — 71045 X-RAY EXAM CHEST 1 VIEW: CPT | Mod: 26

## 2021-12-23 PROCEDURE — 88305 TISSUE EXAM BY PATHOLOGIST: CPT | Mod: 26

## 2021-12-23 RX ORDER — HEPARIN SODIUM 5000 [USP'U]/ML
5000 INJECTION INTRAVENOUS; SUBCUTANEOUS EVERY 12 HOURS
Refills: 0 | Status: DISCONTINUED | OUTPATIENT
Start: 2021-12-23 | End: 2021-12-31

## 2021-12-23 RX ORDER — SCOPALAMINE 1 MG/3D
1 PATCH, EXTENDED RELEASE TRANSDERMAL
Refills: 0 | Status: DISCONTINUED | OUTPATIENT
Start: 2021-12-23 | End: 2021-12-23

## 2021-12-23 RX ORDER — PANTOPRAZOLE SODIUM 20 MG/1
40 TABLET, DELAYED RELEASE ORAL
Refills: 0 | Status: DISCONTINUED | OUTPATIENT
Start: 2021-12-23 | End: 2021-12-31

## 2021-12-23 RX ORDER — HYDROMORPHONE HYDROCHLORIDE 2 MG/ML
1 INJECTION INTRAMUSCULAR; INTRAVENOUS; SUBCUTANEOUS EVERY 4 HOURS
Refills: 0 | Status: DISCONTINUED | OUTPATIENT
Start: 2021-12-23 | End: 2021-12-30

## 2021-12-23 RX ORDER — SODIUM BICARBONATE 1 MEQ/ML
650 SYRINGE (ML) INTRAVENOUS EVERY 8 HOURS
Refills: 0 | Status: DISCONTINUED | OUTPATIENT
Start: 2021-12-23 | End: 2021-12-26

## 2021-12-23 RX ORDER — FOLIC ACID 0.8 MG
1 TABLET ORAL DAILY
Refills: 0 | Status: DISCONTINUED | OUTPATIENT
Start: 2021-12-23 | End: 2021-12-31

## 2021-12-23 RX ORDER — THIAMINE MONONITRATE (VIT B1) 100 MG
100 TABLET ORAL DAILY
Refills: 0 | Status: DISCONTINUED | OUTPATIENT
Start: 2021-12-23 | End: 2021-12-23

## 2021-12-23 RX ORDER — SODIUM CHLORIDE 9 MG/ML
1000 INJECTION, SOLUTION INTRAVENOUS
Refills: 0 | Status: DISCONTINUED | OUTPATIENT
Start: 2021-12-23 | End: 2021-12-23

## 2021-12-23 RX ORDER — METOCLOPRAMIDE HCL 10 MG
5 TABLET ORAL
Refills: 0 | Status: DISCONTINUED | OUTPATIENT
Start: 2021-12-23 | End: 2021-12-28

## 2021-12-23 RX ORDER — METOCLOPRAMIDE HCL 10 MG
5 TABLET ORAL
Refills: 0 | Status: DISCONTINUED | OUTPATIENT
Start: 2021-12-23 | End: 2021-12-23

## 2021-12-23 RX ORDER — FOLIC ACID 0.8 MG
1 TABLET ORAL DAILY
Refills: 0 | Status: DISCONTINUED | OUTPATIENT
Start: 2021-12-23 | End: 2021-12-23

## 2021-12-23 RX ORDER — THIAMINE MONONITRATE (VIT B1) 100 MG
100 TABLET ORAL DAILY
Refills: 0 | Status: DISCONTINUED | OUTPATIENT
Start: 2021-12-23 | End: 2021-12-31

## 2021-12-23 RX ORDER — SODIUM CHLORIDE 9 MG/ML
1000 INJECTION, SOLUTION INTRAVENOUS
Refills: 0 | Status: DISCONTINUED | OUTPATIENT
Start: 2021-12-23 | End: 2021-12-24

## 2021-12-23 RX ORDER — TAMSULOSIN HYDROCHLORIDE 0.4 MG/1
0.4 CAPSULE ORAL AT BEDTIME
Refills: 0 | Status: DISCONTINUED | OUTPATIENT
Start: 2021-12-23 | End: 2021-12-31

## 2021-12-23 RX ORDER — SUCRALFATE 1 G
1 TABLET ORAL
Refills: 0 | Status: DISCONTINUED | OUTPATIENT
Start: 2021-12-23 | End: 2021-12-31

## 2021-12-23 RX ORDER — ONDANSETRON 8 MG/1
4 TABLET, FILM COATED ORAL ONCE
Refills: 0 | Status: DISCONTINUED | OUTPATIENT
Start: 2021-12-23 | End: 2021-12-23

## 2021-12-23 RX ORDER — ACETAMINOPHEN 500 MG
650 TABLET ORAL EVERY 6 HOURS
Refills: 0 | Status: DISCONTINUED | OUTPATIENT
Start: 2021-12-23 | End: 2021-12-30

## 2021-12-23 RX ORDER — LIPASE/PROTEASE/AMYLASE 16-48-48K
2 CAPSULE,DELAYED RELEASE (ENTERIC COATED) ORAL
Refills: 0 | Status: DISCONTINUED | OUTPATIENT
Start: 2021-12-23 | End: 2021-12-31

## 2021-12-23 RX ADMIN — HYDROMORPHONE HYDROCHLORIDE 1 MILLIGRAM(S): 2 INJECTION INTRAMUSCULAR; INTRAVENOUS; SUBCUTANEOUS at 00:50

## 2021-12-23 RX ADMIN — HYDROMORPHONE HYDROCHLORIDE 1 MILLIGRAM(S): 2 INJECTION INTRAMUSCULAR; INTRAVENOUS; SUBCUTANEOUS at 08:33

## 2021-12-23 RX ADMIN — Medication 2 CAPSULE(S): at 08:23

## 2021-12-23 RX ADMIN — PANTOPRAZOLE SODIUM 40 MILLIGRAM(S): 20 TABLET, DELAYED RELEASE ORAL at 05:50

## 2021-12-23 RX ADMIN — Medication 100 MILLIGRAM(S): at 11:48

## 2021-12-23 RX ADMIN — Medication 5 MILLIGRAM(S): at 11:48

## 2021-12-23 RX ADMIN — HYDROMORPHONE HYDROCHLORIDE 1 MILLIGRAM(S): 2 INJECTION INTRAMUSCULAR; INTRAVENOUS; SUBCUTANEOUS at 04:13

## 2021-12-23 RX ADMIN — Medication 5 MILLIGRAM(S): at 17:49

## 2021-12-23 RX ADMIN — HYDROMORPHONE HYDROCHLORIDE 1 MILLIGRAM(S): 2 INJECTION INTRAMUSCULAR; INTRAVENOUS; SUBCUTANEOUS at 08:45

## 2021-12-23 RX ADMIN — HYDROMORPHONE HYDROCHLORIDE 1 MILLIGRAM(S): 2 INJECTION INTRAMUSCULAR; INTRAVENOUS; SUBCUTANEOUS at 12:43

## 2021-12-23 RX ADMIN — Medication 1 MILLIGRAM(S): at 11:48

## 2021-12-23 RX ADMIN — Medication 1 TABLET(S): at 11:48

## 2021-12-23 RX ADMIN — Medication 2 CAPSULE(S): at 11:48

## 2021-12-23 RX ADMIN — ONDANSETRON 4 MILLIGRAM(S): 8 TABLET, FILM COATED ORAL at 04:15

## 2021-12-23 RX ADMIN — Medication 1 GRAM(S): at 18:45

## 2021-12-23 RX ADMIN — HYDROMORPHONE HYDROCHLORIDE 1 MILLIGRAM(S): 2 INJECTION INTRAMUSCULAR; INTRAVENOUS; SUBCUTANEOUS at 04:43

## 2021-12-23 RX ADMIN — HYDROMORPHONE HYDROCHLORIDE 1 MILLIGRAM(S): 2 INJECTION INTRAMUSCULAR; INTRAVENOUS; SUBCUTANEOUS at 00:15

## 2021-12-23 RX ADMIN — HYDROMORPHONE HYDROCHLORIDE 1 MILLIGRAM(S): 2 INJECTION INTRAMUSCULAR; INTRAVENOUS; SUBCUTANEOUS at 13:00

## 2021-12-23 RX ADMIN — Medication 650 MILLIGRAM(S): at 14:05

## 2021-12-23 RX ADMIN — Medication 2 CAPSULE(S): at 17:49

## 2021-12-23 RX ADMIN — SODIUM CHLORIDE 75 MILLILITER(S): 9 INJECTION, SOLUTION INTRAVENOUS at 14:07

## 2021-12-23 RX ADMIN — Medication 650 MILLIGRAM(S): at 05:50

## 2021-12-23 RX ADMIN — Medication 5 MILLIGRAM(S): at 05:50

## 2021-12-23 RX ADMIN — PANTOPRAZOLE SODIUM 40 MILLIGRAM(S): 20 TABLET, DELAYED RELEASE ORAL at 17:49

## 2021-12-23 RX ADMIN — HEPARIN SODIUM 5000 UNIT(S): 5000 INJECTION INTRAVENOUS; SUBCUTANEOUS at 17:49

## 2021-12-23 NOTE — PROGRESS NOTE ADULT - ASSESSMENT
60 y/o M with hx of ETOH abuse, chronic pancreatitis, Hepatitis C, Gout, hx of gastric perforation in 2019, and hx of CKD with prior HD presents with nausea/vomiting admitted to medical for acute on chronic pancreatitis.    utox neg   CXR clear   EKG: NSR, LVH   CT C, T spine/ CT head - unremarkable       Assessment and Plan:     Abdominal pain 2/2 Diffuse esophagitis and chronic pancreatitis  12/23 EGD: hiatal hernia, gastritis   CTAP : FLUID WITHIN A THICK-WALLED ESOPHAGUS. ASSOCIATED THICKENING OF THE   ARYEPIGLOTTIC FOLDS AND FALSE CORDS. moderate pancreatic atrophy with extensive   dystrophic calcification throughout, compatible with chronic   pancreatitis. There is severe dilatation of the pancreatic duct within   the body and tail secondary to creations in the pancreatic duct. There is   no peripancreatic stranding or fluid collection.  moderate hiatal hernia. There is moderate concentric   low-attenuation wall thickening of the esophagus with prominent mucosal   enhancement, suggestive of use esophagitis.  blood Cx --NGTD   clear liquid diet   PRN pain regimen  IVF  GI following   PPI , carafate   antiemetic     HypoKalemia --repleted     Anemia, THO   1u PRBC today, repeat CBC post transfusion   no e/o bleeding or bruising   FOBT   venofer   H/H stable     YUNIEL HD dependent in the past; recovery with CKD 4 now with repeat YUNIEL - slowly better  Right kidney atropic, small left renal cyst  Nephro following   Adjust all meds as per CrCl  renal US: unremarkable   good urine output     Hepatitis C  Viral load  Not on meds currently  unknown treatment course, will need outpatient follow-up     BPH  chen inserted in ER , will need TOV prior to d/c   flomax     ETOH dependence , not in w/d   CIWA monitoring with symptom trigger ativan prn for CIWA >8   thiamine, MVI, folic acid     Preventative measures   SCDs-dvt ppx  fall precautions   PT :CARMEN  62 y/o M with hx of ETOH abuse, chronic pancreatitis, Hepatitis C, Gout, hx of gastric perforation in 2019, and hx of CKD with prior HD presents with nausea/vomiting admitted to medical for acute on chronic pancreatitis.    utox neg   CXR clear   EKG: NSR, LVH   CT C, T spine/ CT head - unremarkable       Assessment and Plan:     Abdominal pain 2/2 Diffuse esophagitis and chronic pancreatitis  12/23 EGD: hiatal hernia, gastritis   CTAP : FLUID WITHIN A THICK-WALLED ESOPHAGUS. ASSOCIATED THICKENING OF THE   ARYEPIGLOTTIC FOLDS AND FALSE CORDS. moderate pancreatic atrophy with extensive   dystrophic calcification throughout, compatible with chronic   pancreatitis. There is severe dilatation of the pancreatic duct within   the body and tail secondary to creations in the pancreatic duct. There is   no peripancreatic stranding or fluid collection.  moderate hiatal hernia. There is moderate concentric   low-attenuation wall thickening of the esophagus with prominent mucosal   enhancement, suggestive of use esophagitis.  blood Cx --NGTD   clear liquid diet   PRN pain regimen  IVF  GI following   PPI , carafate   antiemetic   creon    HypoKalemia --repleted     Anemia, THO   1u PRBC today, repeat CBC post transfusion   no e/o bleeding or bruising   FOBT   venofer   H/H stable     YUNIEL HD dependent in the past; recovery with CKD 4 now with repeat YUNIEL - slowly better  Right kidney atropic, small left renal cyst  Nephro following   Adjust all meds as per CrCl  renal US: unremarkable   good urine output     Hepatitis C  Viral load  Not on meds currently  unknown treatment course, will need outpatient follow-up     BPH  chen inserted in ER , will need TOV prior to d/c   flomax     ETOH dependence , not in w/d   CIWA monitoring with symptom trigger ativan prn for CIWA >8   thiamine, MVI, folic acid     Preventative measures   SCDs-dvt ppx  fall precautions   PT :CARMEN

## 2021-12-23 NOTE — PROGRESS NOTE ADULT - SUBJECTIVE AND OBJECTIVE BOX
NEPHROLOGY PROGRESS NOTE    CHIEF COMPLAINT:  YUNIEL    HPI:  Small improvement in renal function with urine 1.2 liters.  Still with abdominal pain.    EXAM:  T(F): 98.2 (12-23-21 @ 12:38)  HR: 78 (12-23-21 @ 12:38)  BP: 121/70 (12-23-21 @ 12:38)  RR: 17 (12-23-21 @ 12:38)  SpO2: 98% (12-23-21 @ 12:38)    Conversant, in no apparent distress  Normal respiratory effort, lungs clear bilaterally  Heart RRR with no murmur, no peripheral edema         LABS                             8.4    9.65  )-----------( 395      ( 23 Dec 2021 08:15 )             28.1          12-23    140  |  111<H>  |  65<H>  ----------------------------<  108<H>  3.7   |  21<L>  |  4.75<H>    Ca    8.4<L>      23 Dec 2021 08:15  Phos  3.4     12-23  Mg     2.1     12-23    TPro  6.2  /  Alb  2.5<L>  /  TBili  0.1<L>  /  DBili  x   /  AST  9<L>  /  ALT  11<L>  /  AlkPhos  123<H>  12-22             Assessment   YUNIEL HD dependent in the past; recovery with CKD 4 now with repeat YUNIEL - slowly better  Diffuse esophagitis  Chronic pancreatitis    Plan  Maintenance IVF  Daily BMP

## 2021-12-23 NOTE — PROGRESS NOTE ADULT - SUBJECTIVE AND OBJECTIVE BOX
Patient is a 61y old  Male who presents with a chief complaint of abd pain (21 Dec 2021 10:06)    INTERVAL HPI/OVERNIGHT EVENTS: Patients seen and examined at bedside this morning. No acute events overnight.   unable to tolerate clear liquid, states esophageal pain   denies vomiting.   spitting up clear sputum into a bin     Denies fever, chills, dizziness, HA, cough, CP, palpitations, SOB, dysuria.     MEDICATIONS  (STANDING):  heparin   Injectable 5000 Unit(s) SubCutaneous every 12 hours  pantoprazole  Injectable 40 milliGRAM(s) IV Push daily  potassium chloride  20 mEq/100 mL IVPB 20 milliEquivalent(s) IV Intermittent every 2 hours  sodium chloride 0.9%. 1000 milliLiter(s) (100 mL/Hr) IV Continuous <Continuous>    MEDICATIONS  (PRN):  HYDROmorphone  Injectable 0.5 milliGRAM(s) IV Push every 12 hours PRN Moderate Pain (4 - 6)  ondansetron Injectable 4 milliGRAM(s) IV Push every 6 hours PRN Vomiting    Allergies    No Known Allergies    Intolerances      Vital Signs Last 24 Hrs  T(C): 36.5 (23 Dec 2021 17:34), Max: 36.9 (23 Dec 2021 11:55)  T(F): 97.7 (23 Dec 2021 17:34), Max: 98.4 (23 Dec 2021 11:55)  HR: 70 (23 Dec 2021 17:34) (67 - 82)  BP: 110/57 (23 Dec 2021 17:34) (83/46 - 121/70)  BP(mean): --  RR: 18 (23 Dec 2021 17:34) (10 - 19)  SpO2: 100% (23 Dec 2021 17:34) (98% - 100%)      CAPILLARY BLOOD GLUCOSE        PHYSICAL EXAM:  GENERAL: NAD, no increased WOB   HEAD:  Atraumatic, Normocephalic  EYES: EOMI, PERRLA, conjunctiva and sclera clear  ENMT: No tonsillar erythema, exudates, or enlargement;   NECK: Supple, No JVD,  NERVOUS SYSTEM:  Alert & Oriented X3, Good concentration; nonfocal   CHEST/LUNG: CTAB  HEART: Regular rate and rhythm; No murmurs, rubs, or gallops  ABDOMEN: Soft, Nontender, Nondistended; Bowel sounds present; midline old well heeled scar   EXTREMITIES:  2+ Peripheral Pulses b/l, No clubbing, cyanosis, calf tenderness or edema b/l   +chen with good output     Labs                                     8.4    9.65  )-----------( 395      ( 23 Dec 2021 08:15 )             28.1   12-    140  |  111<H>  |  65<H>  ----------------------------<  108<H>  3.7   |  21<L>  |  4.75<H>    Ca    8.4<L>      23 Dec 2021 08:15  Phos  3.4       Mg     2.1         TPro  6.2  /  Alb  2.5<L>  /  TBili  0.1<L>  /  DBili  x   /  AST  9<L>  /  ALT  11<L>  /  AlkPhos  123<H>              Urinalysis Basic - ( 20 Dec 2021 19:52 )    Color: Yellow / Appearance: Clear / S.010 / pH: x  Gluc: x / Ketone: Negative  / Bili: Negative / Urobili: Negative mg/dL   Blood: x / Protein: 30 mg/dL / Nitrite: Negative   Leuk Esterase: Negative / RBC: x / WBC 0-2   Sq Epi: x / Non Sq Epi: Occasional / Bacteria: Few    Consultant(s) Notes Reviewed [ x] Yes     Care Discussed with [x ] Consultants  [x ] Patient  [ ] Family  [x ] /   [x ] Other; RN

## 2021-12-23 NOTE — PHYSICAL THERAPY INITIAL EVALUATION ADULT - STRENGTHENING, PT EVAL
Improve strength in the UE and LE to 5/5  and be able to perform functional tasks-bed mobility, sitting, standing, transfers and ambulate in a safe manner with   assistive device and prevent falls.

## 2021-12-23 NOTE — BRIEF OPERATIVE NOTE - NSICDXBRIEFPREOP_GEN_ALL_CORE_FT
PRE-OP DIAGNOSIS:  Nausea & vomiting 23-Dec-2021 16:11:28  Jose Ceballos  Abdominal pain 23-Dec-2021 16:11:45  Jose Ceballos

## 2021-12-23 NOTE — BRIEF OPERATIVE NOTE - NSICDXBRIEFPOSTOP_GEN_ALL_CORE_FT
POST-OP DIAGNOSIS:  Gastritis 23-Dec-2021 16:12:04  Jose Ceballos  Duodenitis 23-Dec-2021 16:12:14  Jose Ceballos  Hiatal hernia 23-Dec-2021 16:12:24  Jose Ceballos

## 2021-12-23 NOTE — PROGRESS NOTE ADULT - SUBJECTIVE AND OBJECTIVE BOX
Procedure:           Upper GI endoscopy with biopsy 12-23-21 @ 16:01    Indications:            N/V, abdominal pain          Monitored Anesthesia Care Provided by :     ____________________________________________________________________________________________________  Procedure:           Pre-Anesthesia Assessment:                       - Prior to the procedure, a History and Physical was performed, and patient                        medications and allergies were reviewed. The patient is competent. The risks                        and benefits of the procedure and the sedation options and risks were                        discussed with the patient. All questions were answered and informed consent                        was obtained. Patient identification and proposed procedure were verified by                        the physician, the nurse and the anesthesiologist in the procedure room.                        Mental Status Examination:     alert and oriented. Airway Examination: normal                        oropharyngeal airway and neck mobility. Respiratory Examination: clear to                        auscultation. CV Examination: normal. Prophylactic Antibiotics: The patient                        does not require prophylactic antibiotics.                        Grade Assessment: . After                        reviewing the risks and benefits, the patient was deemed in satisfactory                        condition to undergo the procedure. The anesthesia plan was to use monitored                        anesthesia care (MAC). Immediately prior to administration of medications,                        the patient was re-assessed for adequacy to receive sedatives. The heart        		     rate, respiratory rate, oxygen saturations, blood pressure, adequacy of                        pulmonary ventilation, and response to care were monitored throughout the                        procedure. The physical status of the patient was re-assessed after the                        procedure.                       After obtaining informed consent, the endoscope was passed under direct                        vision. Throughout the procedure, the patient's blood pressure, pulse, and                        oxygen saturations were monitored continuously. The Endoscope was introduced                        through the mouth, and advanced to the second part of duodenum. Retroflexion was done in the stomach. The upper GI                        endoscopy was accomplished with ease. The patient tolerated the procedure                        well.    ESOPHAGUS:  WNL             Large Hiatal Hernia    STOMACH:   Mild antral gastritis  s/p biopsy    DUODENUM: Edematous folds w/ dimple c/w healing ulcer s/p biopsy      Assessment :  As Above     PLAN : Carafate, PPI, Advance diet, check histology

## 2021-12-23 NOTE — PHYSICAL THERAPY INITIAL EVALUATION ADULT - TRANSFER SAFETY CONCERNS NOTED: SIT/STAND, REHAB EVAL
decreased balance during turns/decreased safety awareness/losing balance/decreased proprioception/decreased sequencing ability/decreased step length/decreased weight-shifting ability

## 2021-12-23 NOTE — PHYSICAL THERAPY INITIAL EVALUATION ADULT - LIVES WITH, PROFILE
Pt states he stays in his mother's pvt house but not only temporary, has 5 steps with rails to enter the house.

## 2021-12-23 NOTE — PHYSICAL THERAPY INITIAL EVALUATION ADULT - GENERAL OBSERVATIONS, REHAB EVAL
Pt is alert, oriented x 4, follow directions on room air, c/o pain and discomfort epigastric/abdominal area and generalized weakness especially the lower extremities.

## 2021-12-24 LAB
ANION GAP SERPL CALC-SCNC: 7 MMOL/L — SIGNIFICANT CHANGE UP (ref 5–17)
BUN SERPL-MCNC: 51 MG/DL — HIGH (ref 7–23)
CALCIUM SERPL-MCNC: 8 MG/DL — LOW (ref 8.5–10.1)
CHLORIDE SERPL-SCNC: 109 MMOL/L — HIGH (ref 96–108)
CO2 SERPL-SCNC: 20 MMOL/L — LOW (ref 22–31)
CREAT SERPL-MCNC: 4.3 MG/DL — HIGH (ref 0.5–1.3)
GLUCOSE SERPL-MCNC: 89 MG/DL — SIGNIFICANT CHANGE UP (ref 70–99)
HCT VFR BLD CALC: 25.4 % — LOW (ref 39–50)
HGB BLD-MCNC: 7.5 G/DL — LOW (ref 13–17)
HIV 1+2 AB+HIV1 P24 AG SERPL QL IA: SIGNIFICANT CHANGE UP
MAGNESIUM SERPL-MCNC: 2.4 MG/DL — SIGNIFICANT CHANGE UP (ref 1.6–2.6)
MCHC RBC-ENTMCNC: 22.6 PG — LOW (ref 27–34)
MCHC RBC-ENTMCNC: 29.5 GM/DL — LOW (ref 32–36)
MCV RBC AUTO: 76.5 FL — LOW (ref 80–100)
NRBC # BLD: 0 /100 WBCS — SIGNIFICANT CHANGE UP (ref 0–0)
PHOSPHATE SERPL-MCNC: 3.2 MG/DL — SIGNIFICANT CHANGE UP (ref 2.5–4.5)
PLATELET # BLD AUTO: 350 K/UL — SIGNIFICANT CHANGE UP (ref 150–400)
POTASSIUM SERPL-MCNC: 3.7 MMOL/L — SIGNIFICANT CHANGE UP (ref 3.5–5.3)
POTASSIUM SERPL-SCNC: 3.7 MMOL/L — SIGNIFICANT CHANGE UP (ref 3.5–5.3)
RBC # BLD: 3.32 M/UL — LOW (ref 4.2–5.8)
RBC # FLD: 17 % — HIGH (ref 10.3–14.5)
SODIUM SERPL-SCNC: 136 MMOL/L — SIGNIFICANT CHANGE UP (ref 135–145)
WBC # BLD: 6.61 K/UL — SIGNIFICANT CHANGE UP (ref 3.8–10.5)
WBC # FLD AUTO: 6.61 K/UL — SIGNIFICANT CHANGE UP (ref 3.8–10.5)

## 2021-12-24 PROCEDURE — 99232 SBSQ HOSP IP/OBS MODERATE 35: CPT

## 2021-12-24 RX ORDER — SENNA PLUS 8.6 MG/1
2 TABLET ORAL AT BEDTIME
Refills: 0 | Status: DISCONTINUED | OUTPATIENT
Start: 2021-12-24 | End: 2021-12-31

## 2021-12-24 RX ORDER — FERROUS SULFATE 325(65) MG
325 TABLET ORAL DAILY
Refills: 0 | Status: DISCONTINUED | OUTPATIENT
Start: 2021-12-24 | End: 2021-12-30

## 2021-12-24 RX ORDER — MAGNESIUM HYDROXIDE 400 MG/1
30 TABLET, CHEWABLE ORAL DAILY
Refills: 0 | Status: DISCONTINUED | OUTPATIENT
Start: 2021-12-24 | End: 2021-12-31

## 2021-12-24 RX ORDER — SODIUM CHLORIDE 9 MG/ML
1000 INJECTION, SOLUTION INTRAVENOUS
Refills: 0 | Status: DISCONTINUED | OUTPATIENT
Start: 2021-12-24 | End: 2021-12-24

## 2021-12-24 RX ORDER — IRON SUCROSE 20 MG/ML
200 INJECTION, SOLUTION INTRAVENOUS ONCE
Refills: 0 | Status: COMPLETED | OUTPATIENT
Start: 2021-12-24 | End: 2021-12-24

## 2021-12-24 RX ORDER — ACETAMINOPHEN 500 MG
1000 TABLET ORAL ONCE
Refills: 0 | Status: COMPLETED | OUTPATIENT
Start: 2021-12-24 | End: 2021-12-24

## 2021-12-24 RX ADMIN — HYDROMORPHONE HYDROCHLORIDE 1 MILLIGRAM(S): 2 INJECTION INTRAMUSCULAR; INTRAVENOUS; SUBCUTANEOUS at 01:43

## 2021-12-24 RX ADMIN — Medication 2 CAPSULE(S): at 17:17

## 2021-12-24 RX ADMIN — HYDROMORPHONE HYDROCHLORIDE 1 MILLIGRAM(S): 2 INJECTION INTRAMUSCULAR; INTRAVENOUS; SUBCUTANEOUS at 14:27

## 2021-12-24 RX ADMIN — Medication 100 MILLIGRAM(S): at 13:10

## 2021-12-24 RX ADMIN — Medication 1 MILLIGRAM(S): at 13:10

## 2021-12-24 RX ADMIN — HEPARIN SODIUM 5000 UNIT(S): 5000 INJECTION INTRAVENOUS; SUBCUTANEOUS at 17:15

## 2021-12-24 RX ADMIN — IRON SUCROSE 110 MILLIGRAM(S): 20 INJECTION, SOLUTION INTRAVENOUS at 23:30

## 2021-12-24 RX ADMIN — Medication 650 MILLIGRAM(S): at 21:25

## 2021-12-24 RX ADMIN — Medication 1 GRAM(S): at 13:10

## 2021-12-24 RX ADMIN — Medication 5 MILLIGRAM(S): at 05:32

## 2021-12-24 RX ADMIN — HYDROMORPHONE HYDROCHLORIDE 1 MILLIGRAM(S): 2 INJECTION INTRAMUSCULAR; INTRAVENOUS; SUBCUTANEOUS at 18:04

## 2021-12-24 RX ADMIN — Medication 650 MILLIGRAM(S): at 05:31

## 2021-12-24 RX ADMIN — TAMSULOSIN HYDROCHLORIDE 0.4 MILLIGRAM(S): 0.4 CAPSULE ORAL at 21:25

## 2021-12-24 RX ADMIN — HYDROMORPHONE HYDROCHLORIDE 1 MILLIGRAM(S): 2 INJECTION INTRAMUSCULAR; INTRAVENOUS; SUBCUTANEOUS at 22:06

## 2021-12-24 RX ADMIN — Medication 2 CAPSULE(S): at 13:10

## 2021-12-24 RX ADMIN — Medication 1 GRAM(S): at 05:31

## 2021-12-24 RX ADMIN — HEPARIN SODIUM 5000 UNIT(S): 5000 INJECTION INTRAVENOUS; SUBCUTANEOUS at 05:31

## 2021-12-24 RX ADMIN — HYDROMORPHONE HYDROCHLORIDE 1 MILLIGRAM(S): 2 INJECTION INTRAMUSCULAR; INTRAVENOUS; SUBCUTANEOUS at 10:10

## 2021-12-24 RX ADMIN — HYDROMORPHONE HYDROCHLORIDE 1 MILLIGRAM(S): 2 INJECTION INTRAMUSCULAR; INTRAVENOUS; SUBCUTANEOUS at 18:27

## 2021-12-24 RX ADMIN — SENNA PLUS 2 TABLET(S): 8.6 TABLET ORAL at 23:30

## 2021-12-24 RX ADMIN — PANTOPRAZOLE SODIUM 40 MILLIGRAM(S): 20 TABLET, DELAYED RELEASE ORAL at 17:17

## 2021-12-24 RX ADMIN — Medication 1 GRAM(S): at 17:17

## 2021-12-24 RX ADMIN — HYDROMORPHONE HYDROCHLORIDE 1 MILLIGRAM(S): 2 INJECTION INTRAMUSCULAR; INTRAVENOUS; SUBCUTANEOUS at 01:13

## 2021-12-24 RX ADMIN — SODIUM CHLORIDE 35 MILLILITER(S): 9 INJECTION, SOLUTION INTRAVENOUS at 16:03

## 2021-12-24 RX ADMIN — HYDROMORPHONE HYDROCHLORIDE 1 MILLIGRAM(S): 2 INJECTION INTRAMUSCULAR; INTRAVENOUS; SUBCUTANEOUS at 09:40

## 2021-12-24 RX ADMIN — HYDROMORPHONE HYDROCHLORIDE 1 MILLIGRAM(S): 2 INJECTION INTRAMUSCULAR; INTRAVENOUS; SUBCUTANEOUS at 13:57

## 2021-12-24 RX ADMIN — Medication 1 TABLET(S): at 13:10

## 2021-12-24 RX ADMIN — Medication 650 MILLIGRAM(S): at 15:31

## 2021-12-24 RX ADMIN — HYDROMORPHONE HYDROCHLORIDE 1 MILLIGRAM(S): 2 INJECTION INTRAMUSCULAR; INTRAVENOUS; SUBCUTANEOUS at 05:36

## 2021-12-24 RX ADMIN — Medication 2 CAPSULE(S): at 08:20

## 2021-12-24 RX ADMIN — Medication 650 MILLIGRAM(S): at 00:21

## 2021-12-24 RX ADMIN — Medication 650 MILLIGRAM(S): at 23:59

## 2021-12-24 RX ADMIN — TAMSULOSIN HYDROCHLORIDE 0.4 MILLIGRAM(S): 0.4 CAPSULE ORAL at 00:21

## 2021-12-24 RX ADMIN — PANTOPRAZOLE SODIUM 40 MILLIGRAM(S): 20 TABLET, DELAYED RELEASE ORAL at 05:31

## 2021-12-24 RX ADMIN — Medication 1 GRAM(S): at 23:36

## 2021-12-24 RX ADMIN — Medication 400 MILLIGRAM(S): at 23:59

## 2021-12-24 RX ADMIN — Medication 1 GRAM(S): at 00:22

## 2021-12-24 NOTE — PROGRESS NOTE ADULT - SUBJECTIVE AND OBJECTIVE BOX
Patient is a 61y old  Male who presents with a chief complaint of abd pain (21 Dec 2021 10:06)    INTERVAL HPI/OVERNIGHT EVENTS: Patients seen and examined at bedside this morning. No acute events overnight.   unable to tolerate clear liquid, states esophageal pain   denies vomiting.   spitting up clear sputum into a bin     Denies fever, chills, dizziness, HA, cough, CP, palpitations, SOB, dysuria.     MEDICATIONS  (STANDING):  heparin   Injectable 5000 Unit(s) SubCutaneous every 12 hours  pantoprazole  Injectable 40 milliGRAM(s) IV Push daily  potassium chloride  20 mEq/100 mL IVPB 20 milliEquivalent(s) IV Intermittent every 2 hours  sodium chloride 0.9%. 1000 milliLiter(s) (100 mL/Hr) IV Continuous <Continuous>    MEDICATIONS  (PRN):  HYDROmorphone  Injectable 0.5 milliGRAM(s) IV Push every 12 hours PRN Moderate Pain (4 - 6)  ondansetron Injectable 4 milliGRAM(s) IV Push every 6 hours PRN Vomiting    Allergies    No Known Allergies    Intolerances      Vital Signs Last 24 Hrs  T(C): 36.4 (24 Dec 2021 05:18), Max: 36.9 (23 Dec 2021 11:55)  T(F): 97.5 (24 Dec 2021 05:18), Max: 98.4 (23 Dec 2021 11:55)  HR: 80 (24 Dec 2021 05:18) (67 - 89)  BP: 104/63 (24 Dec 2021 05:18) (83/46 - 126/68)  BP(mean): --  RR: 17 (24 Dec 2021 05:18) (10 - 18)  SpO2: 100% (24 Dec 2021 05:18) (98% - 100%)      CAPILLARY BLOOD GLUCOSE        PHYSICAL EXAM:  GENERAL: NAD, no increased WOB   HEAD:  Atraumatic, Normocephalic  EYES: EOMI, PERRLA, conjunctiva and sclera clear  ENMT: No tonsillar erythema, exudates, or enlargement;   NECK: Supple, No JVD,  NERVOUS SYSTEM:  Alert & Oriented X3, Good concentration; nonfocal   CHEST/LUNG: CTAB  HEART: Regular rate and rhythm; No murmurs, rubs, or gallops  ABDOMEN: Soft, Nontender, Nondistended; Bowel sounds present; midline old well heeled scar   EXTREMITIES:  2+ Peripheral Pulses b/l, No clubbing, cyanosis, calf tenderness or edema b/l   +chen with good output     Labs                                     7.5    6.61  )-----------( 350      ( 24 Dec 2021 08:30 )             25.4   12-24    136  |  109<H>  |  51<H>  ----------------------------<  89  3.7   |  20<L>  |  4.30<H>    Ca    8.0<L>      24 Dec 2021 08:30  Phos  3.2     12-24  Mg     2.4     12-24            Urinalysis Basic - ( 20 Dec 2021 19:52 )    Color: Yellow / Appearance: Clear / S.010 / pH: x  Gluc: x / Ketone: Negative  / Bili: Negative / Urobili: Negative mg/dL   Blood: x / Protein: 30 mg/dL / Nitrite: Negative   Leuk Esterase: Negative / RBC: x / WBC 0-2   Sq Epi: x / Non Sq Epi: Occasional / Bacteria: Few    Consultant(s) Notes Reviewed [ x] Yes     Care Discussed with [x ] Consultants  [x ] Patient  [ ] Family  [x ] /   [x ] Other; RN   Patient is a 61y old  Male who presents with a chief complaint of abd pain (21 Dec 2021 10:06)    INTERVAL HPI/OVERNIGHT EVENTS: Patients seen and examined at bedside this morning. No acute events overnight.   states he feels better and requests regular diet today, stated he tolerated ribs     Denies fever, chills, dizziness, HA, cough, CP, palpitations, SOB, dysuria.     MEDICATIONS  (STANDING):  heparin   Injectable 5000 Unit(s) SubCutaneous every 12 hours  pantoprazole  Injectable 40 milliGRAM(s) IV Push daily  potassium chloride  20 mEq/100 mL IVPB 20 milliEquivalent(s) IV Intermittent every 2 hours  sodium chloride 0.9%. 1000 milliLiter(s) (100 mL/Hr) IV Continuous <Continuous>    MEDICATIONS  (PRN):  HYDROmorphone  Injectable 0.5 milliGRAM(s) IV Push every 12 hours PRN Moderate Pain (4 - 6)  ondansetron Injectable 4 milliGRAM(s) IV Push every 6 hours PRN Vomiting    Allergies    No Known Allergies    Intolerances      Vital Signs Last 24 Hrs  T(C): 36.4 (24 Dec 2021 05:18), Max: 36.9 (23 Dec 2021 11:55)  T(F): 97.5 (24 Dec 2021 05:18), Max: 98.4 (23 Dec 2021 11:55)  HR: 80 (24 Dec 2021 05:18) (67 - 89)  BP: 104/63 (24 Dec 2021 05:18) (83/46 - 126/68)  BP(mean): --  RR: 17 (24 Dec 2021 05:18) (10 - 18)  SpO2: 100% (24 Dec 2021 05:18) (98% - 100%)      CAPILLARY BLOOD GLUCOSE        PHYSICAL EXAM:  GENERAL: NAD, no increased WOB   HEAD:  Atraumatic, Normocephalic  EYES: EOMI, PERRLA, conjunctiva and sclera clear  ENMT: No tonsillar erythema, exudates, or enlargement;   NECK: Supple, No JVD,  NERVOUS SYSTEM:  Alert & Oriented X3, Good concentration; nonfocal   CHEST/LUNG: CTAB  HEART: Regular rate and rhythm; No murmurs, rubs, or gallops  ABDOMEN: Soft, Nontender, Nondistended; Bowel sounds present; midline old well heeled scar   EXTREMITIES:  2+ Peripheral Pulses b/l, No clubbing, cyanosis, calf tenderness or edema b/l   +chen with good output     Labs                                     7.5    6.61  )-----------( 350      ( 24 Dec 2021 08:30 )             25.4   12-24    136  |  109<H>  |  51<H>  ----------------------------<  89  3.7   |  20<L>  |  4.30<H>    Ca    8.0<L>      24 Dec 2021 08:30  Phos  3.2     12-24  Mg     2.4     12-24            Urinalysis Basic - ( 20 Dec 2021 19:52 )    Color: Yellow / Appearance: Clear / S.010 / pH: x  Gluc: x / Ketone: Negative  / Bili: Negative / Urobili: Negative mg/dL   Blood: x / Protein: 30 mg/dL / Nitrite: Negative   Leuk Esterase: Negative / RBC: x / WBC 0-2   Sq Epi: x / Non Sq Epi: Occasional / Bacteria: Few    Consultant(s) Notes Reviewed [ x] Yes     Care Discussed with [x ] Consultants  [x ] Patient  [ ] Family  [x ] /   [x ] Other; RN

## 2021-12-24 NOTE — PROGRESS NOTE ADULT - ASSESSMENT
HPI:    61 yr  m,    h/o gout,  ex ETOH abuse, chronic pancreatitis, anemia, , CKD  takes  no meds  ep C s/p treatment, prior gastric perforation with peritonitis    resenting with lower   and  epigastic  pain . worsening  for 1 year.     patient  is  a poor historian,   denies any recent etoh useept known to renal (20 Dec 2021 18:23)  ------- As Above ------------- Patient is a poor historian ( as is his mom )   The patient presents with N/V and abdominal pain x 3 days although mom / chart states this has a been a longer problem. History of ETOH abuse but has stopped. They deny  NSAIDs  Patient is not feeling any better over the past 24 hours. CT scan c/w chronic pancreatitis. Patient found to have significant elevated creatinine  States that he had a colonoscopy 3 years ago. History of hepatitis C that was treated.     A) N/V, abdominal pain - Coughing --> vomiting  - See EGD results - 1) Nephrology f/u  2) Continue PPI 4) Zofran PRN 5) abdominal sonogram 6) Continue Creon   B) Fe def anemia -  getting iron - Will need  colonoscopy when medically optimized  C) history of hepatitis C  - 1) Bloods ordered.  HPI:    61 yr  m,    h/o gout,  ex ETOH abuse, chronic pancreatitis, anemia, , CKD  takes  no meds  ep C s/p treatment, prior gastric perforation with peritonitis    resenting with lower   and  epigastic  pain . worsening  for 1 year.     patient  is  a poor historian,   denies any recent etoh useept known to renal (20 Dec 2021 18:23)  ------- As Above ------------- Patient is a poor historian ( as is his mom )   The patient presents with N/V and abdominal pain x 3 days although mom / chart states this has a been a longer problem. History of ETOH abuse but has stopped. They deny  NSAIDs  Patient is not feeling any better over the past 24 hours. CT scan c/w chronic pancreatitis. Patient found to have significant elevated creatinine  States that he had a colonoscopy 3 years ago. History of hepatitis C that was treated.     A) N/V, abdominal pain - Coughing --> vomiting  - See EGD results - 1) check histology  2) Continue PPI / Carafate 4) Zofran PRN 5) Continue Creon 6) Patient is on Reglan around the clock   B) Fe def anemia -  getting iron - Will need  colonoscopy when medically optimized  C) history of hepatitis C  - 1) Bloods ordered.

## 2021-12-24 NOTE — PROGRESS NOTE ADULT - ASSESSMENT
60 y/o M with hx of ETOH abuse, chronic pancreatitis, Hepatitis C, Gout, hx of gastric perforation in 2019, and hx of CKD with prior HD presents with nausea/vomiting admitted to medical for acute on chronic pancreatitis.    utox neg   CXR clear   EKG: NSR, LVH   CT C, T spine/ CT head - unremarkable       Assessment and Plan:     Abdominal pain 2/2 Diffuse esophagitis and chronic pancreatitis  12/23 EGD: hiatal hernia, gastritis   CTAP : FLUID WITHIN A THICK-WALLED ESOPHAGUS. ASSOCIATED THICKENING OF THE   ARYEPIGLOTTIC FOLDS AND FALSE CORDS. moderate pancreatic atrophy with extensive   dystrophic calcification throughout, compatible with chronic   pancreatitis. There is severe dilatation of the pancreatic duct within   the body and tail secondary to creations in the pancreatic duct. There is   no peripancreatic stranding or fluid collection.  moderate hiatal hernia. There is moderate concentric   low-attenuation wall thickening of the esophagus with prominent mucosal   enhancement, suggestive of use esophagitis.  blood Cx --NGTD   clear liquid diet   PRN pain regimen  IVF  GI following   PPI , carafate   antiemetic   creon    HypoKalemia --repleted     Anemia, THO   1u PRBC today, repeat CBC post transfusion   no e/o bleeding or bruising   FOBT   venofer   H/H stable     YUNIEL HD dependent in the past; recovery with CKD 4 now with repeat YUNIEL - slowly better  Right kidney atropic, small left renal cyst  Nephro following   Adjust all meds as per CrCl  renal US: unremarkable   good urine output     Hepatitis C  Viral load  Not on meds currently  unknown treatment course, will need outpatient follow-up     BPH  chen inserted in ER , will need TOV prior to d/c   flomax     ETOH dependence , not in w/d   CIWA monitoring with symptom trigger ativan prn for CIWA >8   thiamine, MVI, folic acid     Preventative measures   SCDs-dvt ppx  fall precautions   PT :CARMEN  60 y/o M with hx of ETOH abuse, chronic pancreatitis, Hepatitis C, Gout, hx of gastric perforation in 2019, and hx of CKD with prior HD presents with nausea/vomiting admitted to medical for acute on chronic pancreatitis.    utox neg   CXR clear   EKG: NSR, LVH   CT C, T spine/ CT head - unremarkable       Assessment and Plan:     Abdominal pain 2/2 Diffuse esophagitis and chronic pancreatitis  12/23 EGD: hiatal hernia, gastritis   CTAP : FLUID WITHIN A THICK-WALLED ESOPHAGUS. ASSOCIATED THICKENING OF THE   ARYEPIGLOTTIC FOLDS AND FALSE CORDS. moderate pancreatic atrophy with extensive   dystrophic calcification throughout, compatible with chronic   pancreatitis. There is severe dilatation of the pancreatic duct within   the body and tail secondary to creations in the pancreatic duct. There is   no peripancreatic stranding or fluid collection.  moderate hiatal hernia. There is moderate concentric   low-attenuation wall thickening of the esophagus with prominent mucosal   enhancement, suggestive of use esophagitis.  blood Cx --NGTD   tolerated regular diet   PRN pain regimen  IVF  GI following   PPI , carafate   antiemetic   creon    HypoKalemia --repleted     Anemia, THO   1u PRBC today, repeat CBC post transfusion   no e/o bleeding or bruising   FOBT   venofer   iron supplement   H/H stable     YUNIEL HD dependent in the past; recovery with CKD 4 now with repeat YUNIEL - slowly better  Right kidney atropic, small left renal cyst  Nephro following   Adjust all meds as per CrCl  renal US: unremarkable   good urine output     Hepatitis C  Viral load  Not on meds currently  unknown treatment course, will need outpatient follow-up     BPH  chen inserted in ER  flomax   TOV in AM     ETOH dependence , not in w/d   CIWA monitoring with symptom trigger ativan prn for CIWA >8   thiamine, MVI, folic acid     Preventative measures   SCDs-dvt ppx  fall precautions   PT :CARMEN

## 2021-12-24 NOTE — PROGRESS NOTE ADULT - SUBJECTIVE AND OBJECTIVE BOX
Patient is a 61y old  Male who presents with a chief complaint of abd pain (24 Dec 2021 11:14)      HPI:    61 yr  m,    h/o gout,  ex ETOH abuse, chronic pancreatitis, anemia, , CKD  takes  no meds  ep C s/p treatment, prior gastric perforation with peritonitis    resenting with lower   and  epigastic  pain . worsening  for 1 year.     patient  is  a poor historian,   denies any recent etoh useept known to renal (20 Dec 2021 18:23)      INTERVAL HPI/OVERNIGHT EVENTS:   Patient is complaining of pain in the chest, upper abdominal pain and pain mid axillary on both sides of abdomina. Worse with ??? . Tolerating regular diet. The patient denies melena, hematochezia, hematemesis, nausea, vomiting,  constipation, diarrhea, or change in bowel movements     MEDICATIONS  (STANDING):  dextrose 5%. 1000 milliLiter(s) (35 mL/Hr) IV Continuous <Continuous>  folic acid 1 milliGRAM(s) Oral daily  heparin   Injectable 5000 Unit(s) SubCutaneous every 12 hours  metoclopramide 5 milliGRAM(s) Oral three times a day before meals  multivitamin 1 Tablet(s) Oral daily  pancrelipase  (CREON  6,000 Lipase Units) 2 Capsule(s) Oral three times a day with meals  pantoprazole    Tablet 40 milliGRAM(s) Oral two times a day  sodium bicarbonate 650 milliGRAM(s) Oral every 8 hours  sucralfate 1 Gram(s) Oral four times a day  tamsulosin 0.4 milliGRAM(s) Oral at bedtime  thiamine 100 milliGRAM(s) Oral daily    MEDICATIONS  (PRN):  acetaminophen     Tablet .. 650 milliGRAM(s) Oral every 6 hours PRN Temp greater or equal to 38C (100.4F), Mild Pain (1 - 3)  HYDROmorphone  Injectable 1 milliGRAM(s) IV Push every 4 hours PRN Severe Pain (7 - 10)  LORazepam   Injectable 2 milliGRAM(s) IV Push every 2 hours PRN CIWA-Ar score 8 or greater      FAMILY HISTORY:  No pertinent family history in first degree relatives        Allergies    No Known Allergies    Intolerances        PMH/PSH:  ETOH abuse    Pancreatitis    Hepatitis C    Gout    No significant past surgical history          REVIEW OF SYSTEMS:  CONSTITUTIONAL: No fever, weight loss,  EYES: No eye pain, visual disturbances, or discharge  ENMT:  No difficulty hearing, tinnitus, vertigo; No sinus or throat pain  NECK: No pain or stiffness  BREASTS: No pain, masses, or nipple discharge  RESPIRATORY: No cough, wheezing, chills or hemoptysis; No shortness of breath  CARDIOVASCULAR: No chest pain, palpitations, dizziness, or leg swelling  GASTROINTESTINAL: See above   GENITOURINARY: No dysuria, frequency, hematuria, or incontinence  NEUROLOGICAL: No headaches, memory loss, loss of strength, numbness, or tremors  SKIN: No itching, burning, rashes, or lesions   LYMPH NODES: No enlarged glands  ENDOCRINE: No heat or cold intolerance; No hair loss  MUSCULOSKELETAL: No joint pain or swelling; No muscle, back, or extremity pain  PSYCHIATRIC: No depression, anxiety, mood swings, or difficulty sleeping  HEME/LYMPH: No easy bruising, or bleeding gums  ALLERGY AND IMMUNOLOGIC: No hives or eczema    Vital Signs Last 24 Hrs  T(C): 36.7 (24 Dec 2021 13:00), Max: 36.8 (24 Dec 2021 00:09)  T(F): 98 (24 Dec 2021 13:00), Max: 98.3 (24 Dec 2021 00:09)  HR: 78 (24 Dec 2021 13:00) (67 - 89)  BP: 117/78 (24 Dec 2021 13:00) (83/46 - 126/68)  BP(mean): --  RR: 18 (24 Dec 2021 13:00) (10 - 18)  SpO2: 98% (24 Dec 2021 13:00) (98% - 100%)    PHYSICAL EXAM:  GENERAL: NAD, well-groomed, well-developed  HEAD:  Atraumatic, Normocephalic  EYES: EOMI, PERRLA, conjunctiva and sclera clear  NECK: Supple, No JVD, Normal thyroid  NERVOUS SYSTEM:  Alert & Oriented X3, Good concentration; Motor Strength 5/5 B/L upper and lower extremities;   CHEST/LUNG: Clear to percussion bilaterally; No rales, rhonchi, wheezing, or rubs  HEART: Regular rate and rhythm; No murmurs, rubs, or gallops  ABDOMEN: Soft, Nontender, Nondistended; Bowel sounds present  EXTREMITIES:  2+ Peripheral Pulses, No clubbing, cyanosis, or edema  LYMPH: No lymphadenopathy noted  SKIN: No rashes or lesions    LAB  12-23 @ 01:19  amylase 309   lipase --   12-20 @ 15:21  amylase --   lipase 361                           7.5    6.61  )-----------( 350      ( 24 Dec 2021 08:30 )             25.4       CBC:  12-24 @ 08:30  WBC 6.61   Hgb 7.5   Hct 25.4   Plts 350  MCV 76.5  12-23 @ 08:15  WBC 9.65   Hgb 8.4   Hct 28.1   Plts 395  MCV 76.6  12-23 @ 01:19  WBC 10.34   Hgb 8.0   Hct 26.7   Plts 346  MCV 76.5  12-22 @ 07:24  WBC 12.16   Hgb 6.6   Hct 22.4   Plts 421  MCV 74.7  12-21 @ 07:00  WBC 12.30   Hgb 7.8   Hct 25.9   Plts 510  MCV 74.0  12-20 @ 15:21  WBC 10.46   Hgb 8.7   Hct 28.7   Plts 559  MCV 73.4      Chemistry:  12-24 @ 08:30  Na+ 136  K+ 3.7  Cl- 109  CO2 20  BUN 51  Cr 4.30     12-23 @ 08:15  Na+ 140  K+ 3.7  Cl- 111  CO2 21  BUN 65  Cr 4.75     12-22 @ 07:24  Na+ 138  K+ 3.8  Cl- 110  CO2 19  BUN 76  Cr 5.68     12-21 @ 07:00  Na+ 137  K+ 3.1  Cl- 106  CO2 23  BUN 86  Cr 5.72     12-20 @ 15:21  Na+ 134  K+ 3.2  Cl- 100  CO2 21  BUN 90  Cr 6.28         Glucose, Serum: 89 mg/dL (12-24 @ 08:30)  Glucose, Serum: 108 mg/dL (12-23 @ 08:15)  Glucose, Serum: 109 mg/dL (12-22 @ 07:24)  Glucose, Serum: 110 mg/dL (12-21 @ 07:00)  Glucose, Serum: 108 mg/dL (12-20 @ 15:21)      24 Dec 2021 08:30    136    |  109    |  51     ----------------------------<  89     3.7     |  20     |  4.30   23 Dec 2021 08:15    140    |  111    |  65     ----------------------------<  108    3.7     |  21     |  4.75   22 Dec 2021 07:24    138    |  110    |  76     ----------------------------<  109    3.8     |  19     |  5.68   21 Dec 2021 07:00    137    |  106    |  86     ----------------------------<  110    3.1     |  23     |  5.72   20 Dec 2021 15:21    134    |  100    |  90     ----------------------------<  108    3.2     |  21     |  6.28     Ca    8.0        24 Dec 2021 08:30  Ca    8.4        23 Dec 2021 08:15  Ca    8.2        22 Dec 2021 07:24  Ca    8.3        21 Dec 2021 07:00  Ca    9.0        20 Dec 2021 15:21  Phos  3.2       24 Dec 2021 08:30  Phos  3.4       23 Dec 2021 08:15  Phos  3.0       22 Dec 2021 07:24  Mg     2.4       24 Dec 2021 08:30  Mg     2.1       23 Dec 2021 08:15  Mg     2.9       22 Dec 2021 07:24    TPro  6.2    /  Alb  2.5    /  TBili  0.1    /  DBili  x      /  AST  9      /  ALT  11     /  AlkPhos  123    22 Dec 2021 07:24  TPro  7.7    /  Alb  3.0    /  TBili  0.2    /  DBili  x      /  AST  15     /  ALT  10     /  AlkPhos  162    20 Dec 2021 15:21              CAPILLARY BLOOD GLUCOSE          C-Reactive Protein, Serum: <3 mg/L (12-21 @ 02:29)      RADIOLOGY & ADDITIONAL TESTS:    Imaging Personally Reviewed:  [ ] YES  [ ] NO    Consultant(s) Notes Reviewed:  [ ] YES  [ ] NO    Care Discussed with Consultants/Other Providers [ ] YES  [ ] NO

## 2021-12-24 NOTE — PROGRESS NOTE ADULT - SUBJECTIVE AND OBJECTIVE BOX
No distress    Vital Signs Last 24 Hrs  T(C): 36.3 (12-24-21 @ 17:17), Max: 36.8 (12-24-21 @ 00:09)  T(F): 97.4 (12-24-21 @ 17:17), Max: 98.3 (12-24-21 @ 00:09)  HR: 78 (12-24-21 @ 17:17) (74 - 89)  BP: 123/70 (12-24-21 @ 17:17) (104/63 - 126/68)  RR: 18 (12-24-21 @ 17:17) (17 - 18)  SpO2: 99% (12-24-21 @ 17:17) (98% - 100%)    s1s2  b/l air entry  soft. ND  no edema                        7.5    6.61  )-----------( 350      ( 24 Dec 2021 08:30 )             25.4     24 Dec 2021 08:30    136    |  109    |  51     ----------------------------<  89     3.7     |  20     |  4.30     Ca    8.0        24 Dec 2021 08:30  Phos  3.2       24 Dec 2021 08:30  Mg     2.4       24 Dec 2021 08:30    acetaminophen     Tablet .. 650 milliGRAM(s) Oral every 6 hours PRN  dextrose 5%. 1000 milliLiter(s) IV Continuous <Continuous>  folic acid 1 milliGRAM(s) Oral daily  heparin   Injectable 5000 Unit(s) SubCutaneous every 12 hours  HYDROmorphone  Injectable 1 milliGRAM(s) IV Push every 4 hours PRN  LORazepam   Injectable 2 milliGRAM(s) IV Push every 2 hours PRN  metoclopramide 5 milliGRAM(s) Oral three times a day before meals  multivitamin 1 Tablet(s) Oral daily  pancrelipase  (CREON  6,000 Lipase Units) 2 Capsule(s) Oral three times a day with meals  pantoprazole    Tablet 40 milliGRAM(s) Oral two times a day  sodium bicarbonate 650 milliGRAM(s) Oral every 8 hours  sucralfate 1 Gram(s) Oral four times a day  tamsulosin 0.4 milliGRAM(s) Oral at bedtime  thiamine 100 milliGRAM(s) Oral daily         Assessment/Plan:    YUNIEL HD dependent in the past  Recovery with CKD 4 now with repeat YUNIEL   Cr is slowly improving  Diffuse esophagitis  Chronic pancreatitis  Gentle IVF  Avoid nephrotoxins  Daily BMP    [phone]

## 2021-12-25 LAB
ANION GAP SERPL CALC-SCNC: 7 MMOL/L — SIGNIFICANT CHANGE UP (ref 5–17)
BUN SERPL-MCNC: 46 MG/DL — HIGH (ref 7–23)
CALCIUM SERPL-MCNC: 8.5 MG/DL — SIGNIFICANT CHANGE UP (ref 8.5–10.1)
CHLORIDE SERPL-SCNC: 112 MMOL/L — HIGH (ref 96–108)
CO2 SERPL-SCNC: 20 MMOL/L — LOW (ref 22–31)
CREAT SERPL-MCNC: 4.1 MG/DL — HIGH (ref 0.5–1.3)
FOLATE SERPL-MCNC: 4.4 NG/ML — LOW
GLUCOSE SERPL-MCNC: 94 MG/DL — SIGNIFICANT CHANGE UP (ref 70–99)
HCT VFR BLD CALC: 26.5 % — LOW (ref 39–50)
HGB BLD-MCNC: 7.9 G/DL — LOW (ref 13–17)
MCHC RBC-ENTMCNC: 22.8 PG — LOW (ref 27–34)
MCHC RBC-ENTMCNC: 29.8 GM/DL — LOW (ref 32–36)
MCV RBC AUTO: 76.4 FL — LOW (ref 80–100)
NRBC # BLD: 0 /100 WBCS — SIGNIFICANT CHANGE UP (ref 0–0)
PLATELET # BLD AUTO: 403 K/UL — HIGH (ref 150–400)
POTASSIUM SERPL-MCNC: 3.4 MMOL/L — LOW (ref 3.5–5.3)
POTASSIUM SERPL-SCNC: 3.4 MMOL/L — LOW (ref 3.5–5.3)
RBC # BLD: 3.47 M/UL — LOW (ref 4.2–5.8)
RBC # FLD: 17.1 % — HIGH (ref 10.3–14.5)
SODIUM SERPL-SCNC: 139 MMOL/L — SIGNIFICANT CHANGE UP (ref 135–145)
VIT B12 SERPL-MCNC: 795 PG/ML — SIGNIFICANT CHANGE UP (ref 232–1245)
WBC # BLD: 8.1 K/UL — SIGNIFICANT CHANGE UP (ref 3.8–10.5)
WBC # FLD AUTO: 8.1 K/UL — SIGNIFICANT CHANGE UP (ref 3.8–10.5)

## 2021-12-25 PROCEDURE — 99232 SBSQ HOSP IP/OBS MODERATE 35: CPT

## 2021-12-25 RX ORDER — BACLOFEN 100 %
5 POWDER (GRAM) MISCELLANEOUS EVERY 8 HOURS
Refills: 0 | Status: DISCONTINUED | OUTPATIENT
Start: 2021-12-25 | End: 2021-12-25

## 2021-12-25 RX ORDER — FOLIC ACID 0.8 MG
1 TABLET ORAL DAILY
Refills: 0 | Status: DISCONTINUED | OUTPATIENT
Start: 2021-12-25 | End: 2021-12-25

## 2021-12-25 RX ORDER — GABAPENTIN 400 MG/1
100 CAPSULE ORAL EVERY 12 HOURS
Refills: 0 | Status: DISCONTINUED | OUTPATIENT
Start: 2021-12-25 | End: 2021-12-31

## 2021-12-25 RX ORDER — CYCLOBENZAPRINE HYDROCHLORIDE 10 MG/1
5 TABLET, FILM COATED ORAL ONCE
Refills: 0 | Status: COMPLETED | OUTPATIENT
Start: 2021-12-25 | End: 2021-12-25

## 2021-12-25 RX ORDER — POTASSIUM CHLORIDE 20 MEQ
20 PACKET (EA) ORAL ONCE
Refills: 0 | Status: COMPLETED | OUTPATIENT
Start: 2021-12-25 | End: 2021-12-25

## 2021-12-25 RX ORDER — PREGABALIN 225 MG/1
1000 CAPSULE ORAL DAILY
Refills: 0 | Status: DISCONTINUED | OUTPATIENT
Start: 2021-12-25 | End: 2021-12-31

## 2021-12-25 RX ADMIN — HYDROMORPHONE HYDROCHLORIDE 1 MILLIGRAM(S): 2 INJECTION INTRAMUSCULAR; INTRAVENOUS; SUBCUTANEOUS at 02:17

## 2021-12-25 RX ADMIN — Medication 1 GRAM(S): at 06:20

## 2021-12-25 RX ADMIN — SENNA PLUS 2 TABLET(S): 8.6 TABLET ORAL at 21:40

## 2021-12-25 RX ADMIN — HYDROMORPHONE HYDROCHLORIDE 1 MILLIGRAM(S): 2 INJECTION INTRAMUSCULAR; INTRAVENOUS; SUBCUTANEOUS at 22:39

## 2021-12-25 RX ADMIN — TAMSULOSIN HYDROCHLORIDE 0.4 MILLIGRAM(S): 0.4 CAPSULE ORAL at 21:40

## 2021-12-25 RX ADMIN — CYCLOBENZAPRINE HYDROCHLORIDE 5 MILLIGRAM(S): 10 TABLET, FILM COATED ORAL at 21:40

## 2021-12-25 RX ADMIN — Medication 5 MILLIGRAM(S): at 08:15

## 2021-12-25 RX ADMIN — HEPARIN SODIUM 5000 UNIT(S): 5000 INJECTION INTRAVENOUS; SUBCUTANEOUS at 17:10

## 2021-12-25 RX ADMIN — HYDROMORPHONE HYDROCHLORIDE 1 MILLIGRAM(S): 2 INJECTION INTRAMUSCULAR; INTRAVENOUS; SUBCUTANEOUS at 14:33

## 2021-12-25 RX ADMIN — Medication 2 CAPSULE(S): at 08:12

## 2021-12-25 RX ADMIN — Medication 5 MILLIGRAM(S): at 16:07

## 2021-12-25 RX ADMIN — HYDROMORPHONE HYDROCHLORIDE 1 MILLIGRAM(S): 2 INJECTION INTRAMUSCULAR; INTRAVENOUS; SUBCUTANEOUS at 10:25

## 2021-12-25 RX ADMIN — GABAPENTIN 100 MILLIGRAM(S): 400 CAPSULE ORAL at 21:41

## 2021-12-25 RX ADMIN — Medication 2 CAPSULE(S): at 11:36

## 2021-12-25 RX ADMIN — Medication 1 MILLIGRAM(S): at 11:34

## 2021-12-25 RX ADMIN — PANTOPRAZOLE SODIUM 40 MILLIGRAM(S): 20 TABLET, DELAYED RELEASE ORAL at 17:11

## 2021-12-25 RX ADMIN — Medication 5 MILLIGRAM(S): at 11:27

## 2021-12-25 RX ADMIN — Medication 2 CAPSULE(S): at 16:07

## 2021-12-25 RX ADMIN — HYDROMORPHONE HYDROCHLORIDE 1 MILLIGRAM(S): 2 INJECTION INTRAMUSCULAR; INTRAVENOUS; SUBCUTANEOUS at 18:38

## 2021-12-25 RX ADMIN — Medication 650 MILLIGRAM(S): at 06:20

## 2021-12-25 RX ADMIN — Medication 1 TABLET(S): at 11:32

## 2021-12-25 RX ADMIN — Medication 1 GRAM(S): at 17:14

## 2021-12-25 RX ADMIN — Medication 650 MILLIGRAM(S): at 13:34

## 2021-12-25 RX ADMIN — PANTOPRAZOLE SODIUM 40 MILLIGRAM(S): 20 TABLET, DELAYED RELEASE ORAL at 06:20

## 2021-12-25 RX ADMIN — Medication 100 MILLIGRAM(S): at 11:31

## 2021-12-25 RX ADMIN — Medication 1 GRAM(S): at 11:30

## 2021-12-25 RX ADMIN — HYDROMORPHONE HYDROCHLORIDE 1 MILLIGRAM(S): 2 INJECTION INTRAMUSCULAR; INTRAVENOUS; SUBCUTANEOUS at 06:19

## 2021-12-25 RX ADMIN — Medication 20 MILLIEQUIVALENT(S): at 13:34

## 2021-12-25 RX ADMIN — Medication 325 MILLIGRAM(S): at 11:34

## 2021-12-25 RX ADMIN — HEPARIN SODIUM 5000 UNIT(S): 5000 INJECTION INTRAVENOUS; SUBCUTANEOUS at 06:20

## 2021-12-25 RX ADMIN — Medication 650 MILLIGRAM(S): at 21:40

## 2021-12-25 NOTE — PROGRESS NOTE ADULT - ASSESSMENT
HPI:    61 yr  m,    h/o gout,  ex ETOH abuse, chronic pancreatitis, anemia, , CKD  takes  no meds  ep C s/p treatment, prior gastric perforation with peritonitis    resenting with lower   and  epigastic  pain . worsening  for 1 year.     patient  is  a poor historian,   denies any recent etoh useept known to renal (20 Dec 2021 18:23)  ------- As Above ------------- Patient is a poor historian ( as is his mom )   The patient presents with N/V and abdominal pain x 3 days although mom / chart states this has a been a longer problem. History of ETOH abuse but has stopped. They deny  NSAIDs  Patient is not feeling any better over the past 24 hours. CT scan c/w chronic pancreatitis. Patient found to have significant elevated creatinine  States that he had a colonoscopy 3 years ago. History of hepatitis C that was treated.     A) N/V - Patient now only spitting   B) abdominal pain - In association with leg,  chest and back. Pain on palpation over entire chest.  See EGD results - 1) check histology  2) Continue PPI / Carafate 4) Zofran PRN 5) Continue Creon 6) Will d/c Reglan around the clock   B) Fe def anemia -  getting iron - Will need  colonoscopy when medically optimized  C) history of hepatitis C  - 1) Bloods ordered.

## 2021-12-25 NOTE — PROGRESS NOTE ADULT - SUBJECTIVE AND OBJECTIVE BOX
No distress    Vital Signs Last 24 Hrs  T(C): 36.8 (12-25-21 @ 16:49), Max: 37.1 (12-25-21 @ 11:23)  T(F): 98.3 (12-25-21 @ 16:49), Max: 98.7 (12-25-21 @ 11:23)  HR: 85 (12-25-21 @ 16:49) (74 - 90)  BP: 125/92 (12-25-21 @ 16:49) (118/67 - 138/77)  RR: 17 (12-25-21 @ 16:49) (17 - 19)  SpO2: 95% (12-25-21 @ 16:49) (95% - 100%)    s1s2  b/l air entry  soft. ND  no edema                               7.9    8.10  )-----------( 403      ( 25 Dec 2021 08:54 )             26.5     25 Dec 2021 08:54    139    |  112    |  46     ----------------------------<  94     3.4     |  20     |  4.10     Ca    8.5        25 Dec 2021 08:54  Phos  3.2       24 Dec 2021 08:30  Mg     2.4       24 Dec 2021 08:30    acetaminophen     Tablet .. 650 milliGRAM(s) Oral every 6 hours PRN  baclofen 5 milliGRAM(s) Oral every 8 hours PRN  cyanocobalamin 1000 MICROGram(s) Oral daily  cyclobenzaprine 5 milliGRAM(s) Oral once  ferrous    sulfate 325 milliGRAM(s) Oral daily  folic acid 1 milliGRAM(s) Oral daily  gabapentin 100 milliGRAM(s) Oral every 12 hours  heparin   Injectable 5000 Unit(s) SubCutaneous every 12 hours  HYDROmorphone  Injectable 1 milliGRAM(s) IV Push every 4 hours PRN  LORazepam   Injectable 2 milliGRAM(s) IV Push every 2 hours PRN  magnesium hydroxide Suspension 30 milliLiter(s) Oral daily PRN  metoclopramide 5 milliGRAM(s) Oral three times a day before meals  multivitamin 1 Tablet(s) Oral daily  pancrelipase  (CREON  6,000 Lipase Units) 2 Capsule(s) Oral three times a day with meals  pantoprazole    Tablet 40 milliGRAM(s) Oral two times a day  senna 2 Tablet(s) Oral at bedtime  sodium bicarbonate 650 milliGRAM(s) Oral every 8 hours  sucralfate 1 Gram(s) Oral four times a day  tamsulosin 0.4 milliGRAM(s) Oral at bedtime  thiamine 100 milliGRAM(s) Oral daily    Assessment/Plan:    S/p YUNIEL, hx HD dependent in the past  Recovery with CKD 4 now s/p repeat YUNIEL   Cr is improving  Dose meds for CrCl < 20  Avoid baclofen  Diffuse esophagitis  Chronic pancreatitis  Off IVF  Avoid nephrotoxins  F/u [facility]    [phone]

## 2021-12-25 NOTE — PROGRESS NOTE ADULT - ASSESSMENT
60 y/o M with hx of ETOH abuse, chronic pancreatitis, Hepatitis C, Gout, hx of gastric perforation in 2019, and hx of CKD with prior HD presents with nausea/vomiting admitted to medical for acute on chronic pancreatitis.    utox neg   CXR clear   EKG: NSR, LVH   CT C, T spine/ CT head - unremarkable       12/25 passed TOV, PVR <100cc   generalized pains and aches which are not new, pain control prn       Assessment and Plan:     Abdominal pain 2/2 Diffuse esophagitis and chronic pancreatitis  12/23 EGD: hiatal hernia, gastritis   CTAP : FLUID WITHIN A THICK-WALLED ESOPHAGUS. ASSOCIATED THICKENING OF THE   ARYEPIGLOTTIC FOLDS AND FALSE CORDS. moderate pancreatic atrophy with extensive   dystrophic calcification throughout, compatible with chronic   pancreatitis. There is severe dilatation of the pancreatic duct within   the body and tail secondary to creations in the pancreatic duct. There is   no peripancreatic stranding or fluid collection.  moderate hiatal hernia. There is moderate concentric   low-attenuation wall thickening of the esophagus with prominent mucosal   enhancement, suggestive of use esophagitis.  blood Cx --NGTD   tolerated regular diet   PRN pain regimen  GI following   PPI , carafate   antiemetic   creon    HypoKalemia --repleted     Anemia, THO   s/p 1u PRBC   no e/o bleeding or bruising   FOBT   venofer   iron supplement   H/H stable     YUNIEL HD dependent in the past; recovery with CKD 4 now with repeat YUNIEL - slowly better  Right kidney atropic, small left renal cyst  Nephro following   Adjust all meds as per CrCl  renal US: unremarkable   good urine output     Hepatitis C  Viral load  Not on meds currently  unknown treatment course, will need outpatient follow-up     BPH  passed TOV 12/25   flomax     ETOH dependence , not in w/d   CIWA monitoring with symptom trigger ativan prn for CIWA >8   thiamine, MVI, folic acid     Preventative measures   SCDs-dvt ppx  fall precautions   PT :CARMEN (discussed with mother and patient and both agreeable to STR)

## 2021-12-25 NOTE — CHART NOTE - NSCHARTNOTEFT_GEN_A_CORE
Had reviewed CT Scan last night with Dr Jules. Can not rule out food impaction. Discussed case with daughter. After a thorough discussion with both, my plans was to schedule the EGD this AM  Case discussed with anesthesia, Because of the location of the food bolus, the recommendation was to transfer the patient to a tertiary center because of the high risk 1) because of the difficulty of sedation, the patient will need support staff and equipment not available at [facility] 2) Because of the higher risk of esophageal rupture in this case, it would be safer to have procedure done in a tertiary center that has a thoracic surgeon.

## 2021-12-25 NOTE — PROGRESS NOTE ADULT - SUBJECTIVE AND OBJECTIVE BOX
Patient is a 61y old  Male who presents with a chief complaint of abd pain (21 Dec 2021 10:06)    INTERVAL HPI/OVERNIGHT EVENTS: Patients seen and examined at bedside this morning. No acute events overnight.   multiple complaints, states pain starts in legs radiates to scapula and moves across his chest to his stomach and then up to his brain. States he went to see a spiritual man for his problems which have been there a "long time" and was told it's because he doesn't have enough endorphins in his body and he needs to get more endorphins.   hiccups improving   abd pain improved   now spitting up MUCH LESS , bin next to him is empty     Denies fever, chills, dizziness, HA, cough, CP, palpitations, SOB, dysuria.     MEDICATIONS  (STANDING):  heparin   Injectable 5000 Unit(s) SubCutaneous every 12 hours  pantoprazole  Injectable 40 milliGRAM(s) IV Push daily  potassium chloride  20 mEq/100 mL IVPB 20 milliEquivalent(s) IV Intermittent every 2 hours  sodium chloride 0.9%. 1000 milliLiter(s) (100 mL/Hr) IV Continuous <Continuous>    MEDICATIONS  (PRN):  HYDROmorphone  Injectable 0.5 milliGRAM(s) IV Push every 12 hours PRN Moderate Pain (4 - 6)  ondansetron Injectable 4 milliGRAM(s) IV Push every 6 hours PRN Vomiting    Allergies    No Known Allergies    Intolerances      Vital Signs Last 24 Hrs  T(C): 36.8 (25 Dec 2021 16:49), Max: 37.1 (25 Dec 2021 11:23)  T(F): 98.3 (25 Dec 2021 16:49), Max: 98.7 (25 Dec 2021 11:23)  HR: 85 (25 Dec 2021 16:49) (74 - 90)  BP: 125/92 (25 Dec 2021 16:49) (118/67 - 138/77)  BP(mean): --  RR: 17 (25 Dec 2021 16:49) (17 - 19)  SpO2: 95% (25 Dec 2021 16:49) (95% - 100%)      CAPILLARY BLOOD GLUCOSE        PHYSICAL EXAM:  GENERAL: NAD, no increased WOB   HEAD:  Atraumatic, Normocephalic  EYES: EOMI, PERRLA, conjunctiva and sclera clear  ENMT: No tonsillar erythema, exudates, or enlargement;   NECK: Supple, No JVD,  NERVOUS SYSTEM:  Alert & Oriented X3, Good concentration; nonfocal   CHEST/LUNG: CTAB  HEART: Regular rate and rhythm; No murmurs, rubs, or gallops  ABDOMEN: Soft, Nontender, Nondistended; Bowel sounds present; midline old well heeled scar   EXTREMITIES:  2+ Peripheral Pulses b/l, No clubbing, cyanosis, calf tenderness or edema b/l     Labs                                     7.9    8.10  )-----------( 403      ( 25 Dec 2021 08:54 )             26.5   12-25    139  |  112<H>  |  46<H>  ----------------------------<  94  3.4<L>   |  20<L>  |  4.10<H>    Ca    8.5      25 Dec 2021 08:54  Phos  3.2     12-24  Mg     2.4     12-24                Urinalysis Basic - ( 20 Dec 2021 19:52 )    Color: Yellow / Appearance: Clear / S.010 / pH: x  Gluc: x / Ketone: Negative  / Bili: Negative / Urobili: Negative mg/dL   Blood: x / Protein: 30 mg/dL / Nitrite: Negative   Leuk Esterase: Negative / RBC: x / WBC 0-2   Sq Epi: x / Non Sq Epi: Occasional / Bacteria: Few    Consultant(s) Notes Reviewed [ x] Yes     Care Discussed with [x ] Consultants  [x ] Patient  [x ] Family--mother Dede [phone]  [x ] /   [x ] Other; RN  Brother [email or web address] [phone]

## 2021-12-25 NOTE — PROGRESS NOTE ADULT - SUBJECTIVE AND OBJECTIVE BOX
Patient is a 61y old  Male who presents with a chief complaint of abd pain (24 Dec 2021 21:45)      HPI:    61 yr  m,    h/o gout,  ex ETOH abuse, chronic pancreatitis, anemia, , CKD  takes  no meds  ep C s/p treatment, prior gastric perforation with peritonitis    resenting with lower   and  epigastic  pain . worsening  for 1 year.     patient  is  a poor historian,   denies any recent etoh useept known to renal (20 Dec 2021 18:23)      INTERVAL HPI/OVERNIGHT EVENTS:  Patient c/o of chest, leg, abdominal ( vague, constant, NOT A/w meals / BM and back pain. Spitting (+) The patient denies melena, hematochezia, hematemesis, nausea, vomiting, constipation, diarrhea, or change in bowel movements     MEDICATIONS  (STANDING):  cyanocobalamin 1000 MICROGram(s) Oral daily  ferrous    sulfate 325 milliGRAM(s) Oral daily  folic acid 1 milliGRAM(s) Oral daily  heparin   Injectable 5000 Unit(s) SubCutaneous every 12 hours  metoclopramide 5 milliGRAM(s) Oral three times a day before meals  multivitamin 1 Tablet(s) Oral daily  pancrelipase  (CREON  6,000 Lipase Units) 2 Capsule(s) Oral three times a day with meals  pantoprazole    Tablet 40 milliGRAM(s) Oral two times a day  senna 2 Tablet(s) Oral at bedtime  sodium bicarbonate 650 milliGRAM(s) Oral every 8 hours  sucralfate 1 Gram(s) Oral four times a day  tamsulosin 0.4 milliGRAM(s) Oral at bedtime  thiamine 100 milliGRAM(s) Oral daily    MEDICATIONS  (PRN):  acetaminophen     Tablet .. 650 milliGRAM(s) Oral every 6 hours PRN Temp greater or equal to 38C (100.4F), Mild Pain (1 - 3)  HYDROmorphone  Injectable 1 milliGRAM(s) IV Push every 4 hours PRN Severe Pain (7 - 10)  LORazepam   Injectable 2 milliGRAM(s) IV Push every 2 hours PRN CIWA-Ar score 8 or greater  magnesium hydroxide Suspension 30 milliLiter(s) Oral daily PRN Constipation      FAMILY HISTORY:  No pertinent family history in first degree relatives        Allergies    No Known Allergies    Intolerances        PMH/PSH:  ETOH abuse    Pancreatitis    Hepatitis C    Gout    No significant past surgical history          REVIEW OF SYSTEMS:  CONSTITUTIONAL: No fever, weight loss, or fatigue  EYES: No eye pain, visual disturbances, or discharge  ENMT:  No difficulty hearing, tinnitus, vertigo; No sinus or throat pain  NECK: No pain or stiffness  BREASTS: No pain, masses, or nipple discharge  RESPIRATORY: No cough, wheezing, chills or hemoptysis; No shortness of breath  CARDIOVASCULAR: No chest pain, palpitations, dizziness, or leg swelling  GASTROINTESTINAL: See above   GENITOURINARY: No dysuria, frequency, hematuria, or incontinence  NEUROLOGICAL: No headaches, memory loss, loss of strength, numbness, or tremors  SKIN: No itching, burning, rashes, or lesions   LYMPH NODES: No enlarged glands  ENDOCRINE: No heat or cold intolerance; No hair loss  MUSCULOSKELETAL: No joint pain or swelling; No muscle, back, or extremity pain  PSYCHIATRIC: No depression, anxiety, mood swings, or difficulty sleeping  HEME/LYMPH: No easy bruising, or bleeding gums  ALLERGY AND IMMUNOLOGIC: No hives or eczema    Vital Signs Last 24 Hrs  T(C): 37.1 (25 Dec 2021 11:23), Max: 37.1 (25 Dec 2021 11:23)  T(F): 98.7 (25 Dec 2021 11:23), Max: 98.7 (25 Dec 2021 11:23)  HR: 77 (25 Dec 2021 11:23) (74 - 90)  BP: 118/67 (25 Dec 2021 11:23) (118/67 - 138/77)  BP(mean): --  RR: 17 (25 Dec 2021 11:23) (17 - 19)  SpO2: 98% (25 Dec 2021 11:23) (98% - 100%)    PHYSICAL EXAM:  GENERAL: NAD, well-groomed, well-developed  HEAD:  Atraumatic, Normocephalic  EYES: EOMI, PERRLA, conjunctiva and sclera clear  NECK: Supple, No JVD, Normal thyroid  NERVOUS SYSTEM:  Alert & Oriented X3, Good concentration; Motor Strength 5/5 B/L upper and lower extremities;   CHEST/LUNG: Clear to percussion bilaterally; No rales, rhonchi, wheezing, or rubs. Pain on palpation over entire chest  HEART: Regular rate and rhythm; No murmurs, rubs, or gallops  ABDOMEN: Soft, Nontender, Nondistended; Bowel sounds present  EXTREMITIES:  2+ Peripheral Pulses, No clubbing, cyanosis, or edema  LYMPH: No lymphadenopathy noted  SKIN: No rashes or lesions    LAB  12-23 @ 01:19  amylase 309   lipase --   12-20 @ 15:21  amylase --   lipase 361                           7.9    8.10  )-----------( 403      ( 25 Dec 2021 08:54 )             26.5       CBC:  12-25 @ 08:54  WBC 8.10   Hgb 7.9   Hct 26.5   Plts 403  MCV 76.4  12-24 @ 08:30  WBC 6.61   Hgb 7.5   Hct 25.4   Plts 350  MCV 76.5  12-23 @ 08:15  WBC 9.65   Hgb 8.4   Hct 28.1   Plts 395  MCV 76.6  12-23 @ 01:19  WBC 10.34   Hgb 8.0   Hct 26.7   Plts 346  MCV 76.5  12-22 @ 07:24  WBC 12.16   Hgb 6.6   Hct 22.4   Plts 421  MCV 74.7  12-21 @ 07:00  WBC 12.30   Hgb 7.8   Hct 25.9   Plts 510  MCV 74.0  12-20 @ 15:21  WBC 10.46   Hgb 8.7   Hct 28.7   Plts 559  MCV 73.4      Chemistry:  12-25 @ 08:54  Na+ 139  K+ 3.4  Cl- 112  CO2 20  BUN 46  Cr 4.10     12-24 @ 08:30  Na+ 136  K+ 3.7  Cl- 109  CO2 20  BUN 51  Cr 4.30     12-23 @ 08:15  Na+ 140  K+ 3.7  Cl- 111  CO2 21  BUN 65  Cr 4.75     12-22 @ 07:24  Na+ 138  K+ 3.8  Cl- 110  CO2 19  BUN 76  Cr 5.68     12-21 @ 07:00  Na+ 137  K+ 3.1  Cl- 106  CO2 23  BUN 86  Cr 5.72     12-20 @ 15:21  Na+ 134  K+ 3.2  Cl- 100  CO2 21  BUN 90  Cr 6.28         Glucose, Serum: 94 mg/dL (12-25 @ 08:54)  Glucose, Serum: 89 mg/dL (12-24 @ 08:30)  Glucose, Serum: 108 mg/dL (12-23 @ 08:15)  Glucose, Serum: 109 mg/dL (12-22 @ 07:24)  Glucose, Serum: 110 mg/dL (12-21 @ 07:00)  Glucose, Serum: 108 mg/dL (12-20 @ 15:21)      25 Dec 2021 08:54    139    |  112    |  46     ----------------------------<  94     3.4     |  20     |  4.10   24 Dec 2021 08:30    136    |  109    |  51     ----------------------------<  89     3.7     |  20     |  4.30   23 Dec 2021 08:15    140    |  111    |  65     ----------------------------<  108    3.7     |  21     |  4.75   22 Dec 2021 07:24    138    |  110    |  76     ----------------------------<  109    3.8     |  19     |  5.68   21 Dec 2021 07:00    137    |  106    |  86     ----------------------------<  110    3.1     |  23     |  5.72   20 Dec 2021 15:21    134    |  100    |  90     ----------------------------<  108    3.2     |  21     |  6.28     Ca    8.5        25 Dec 2021 08:54  Ca    8.0        24 Dec 2021 08:30  Ca    8.4        23 Dec 2021 08:15  Ca    8.2        22 Dec 2021 07:24  Ca    8.3        21 Dec 2021 07:00  Ca    9.0        20 Dec 2021 15:21  Phos  3.2       24 Dec 2021 08:30  Phos  3.4       23 Dec 2021 08:15  Phos  3.0       22 Dec 2021 07:24  Mg     2.4       24 Dec 2021 08:30  Mg     2.1       23 Dec 2021 08:15  Mg     2.9       22 Dec 2021 07:24    TPro  6.2    /  Alb  2.5    /  TBili  0.1    /  DBili  x      /  AST  9      /  ALT  11     /  AlkPhos  123    22 Dec 2021 07:24  TPro  7.7    /  Alb  3.0    /  TBili  0.2    /  DBili  x      /  AST  15     /  ALT  10     /  AlkPhos  162    20 Dec 2021 15:21              CAPILLARY BLOOD GLUCOSE          C-Reactive Protein, Serum: <3 mg/L (12-21 @ 02:29)      RADIOLOGY & ADDITIONAL TESTS:    Imaging Personally Reviewed:  [ ] YES  [ ] NO    Consultant(s) Notes Reviewed:  [ ] YES  [ ] NO    Care Discussed with Consultants/Other Providers [ ] YES  [ ] NO

## 2021-12-26 LAB
ANION GAP SERPL CALC-SCNC: 7 MMOL/L — SIGNIFICANT CHANGE UP (ref 5–17)
BUN SERPL-MCNC: 41 MG/DL — HIGH (ref 7–23)
CALCIUM SERPL-MCNC: 8.7 MG/DL — SIGNIFICANT CHANGE UP (ref 8.5–10.1)
CHLORIDE SERPL-SCNC: 115 MMOL/L — HIGH (ref 96–108)
CO2 SERPL-SCNC: 18 MMOL/L — LOW (ref 22–31)
CREAT SERPL-MCNC: 4.03 MG/DL — HIGH (ref 0.5–1.3)
CULTURE RESULTS: SIGNIFICANT CHANGE UP
CULTURE RESULTS: SIGNIFICANT CHANGE UP
GLUCOSE SERPL-MCNC: 83 MG/DL — SIGNIFICANT CHANGE UP (ref 70–99)
HEMOGLOBIN INTERPRETATION: SIGNIFICANT CHANGE UP
HGB A MFR BLD: 97.8 % — SIGNIFICANT CHANGE UP (ref 95.8–98)
HGB A2 MFR BLD: 2.2 % — SIGNIFICANT CHANGE UP (ref 2–3.2)
POTASSIUM SERPL-MCNC: 3.5 MMOL/L — SIGNIFICANT CHANGE UP (ref 3.5–5.3)
POTASSIUM SERPL-SCNC: 3.5 MMOL/L — SIGNIFICANT CHANGE UP (ref 3.5–5.3)
SODIUM SERPL-SCNC: 140 MMOL/L — SIGNIFICANT CHANGE UP (ref 135–145)
SPECIMEN SOURCE: SIGNIFICANT CHANGE UP
SPECIMEN SOURCE: SIGNIFICANT CHANGE UP

## 2021-12-26 PROCEDURE — 99232 SBSQ HOSP IP/OBS MODERATE 35: CPT

## 2021-12-26 RX ORDER — ERYTHROPOIETIN 10000 [IU]/ML
10000 INJECTION, SOLUTION INTRAVENOUS; SUBCUTANEOUS
Refills: 0 | Status: DISCONTINUED | OUTPATIENT
Start: 2021-12-26 | End: 2021-12-31

## 2021-12-26 RX ORDER — SODIUM BICARBONATE 1 MEQ/ML
650 SYRINGE (ML) INTRAVENOUS EVERY 6 HOURS
Refills: 0 | Status: DISCONTINUED | OUTPATIENT
Start: 2021-12-26 | End: 2021-12-31

## 2021-12-26 RX ORDER — POTASSIUM CHLORIDE 20 MEQ
10 PACKET (EA) ORAL ONCE
Refills: 0 | Status: COMPLETED | OUTPATIENT
Start: 2021-12-26 | End: 2021-12-26

## 2021-12-26 RX ADMIN — Medication 2 CAPSULE(S): at 08:17

## 2021-12-26 RX ADMIN — HEPARIN SODIUM 5000 UNIT(S): 5000 INJECTION INTRAVENOUS; SUBCUTANEOUS at 18:26

## 2021-12-26 RX ADMIN — PANTOPRAZOLE SODIUM 40 MILLIGRAM(S): 20 TABLET, DELAYED RELEASE ORAL at 18:26

## 2021-12-26 RX ADMIN — Medication 1 GRAM(S): at 11:32

## 2021-12-26 RX ADMIN — Medication 5 MILLIGRAM(S): at 16:33

## 2021-12-26 RX ADMIN — PREGABALIN 1000 MICROGRAM(S): 225 CAPSULE ORAL at 13:33

## 2021-12-26 RX ADMIN — HYDROMORPHONE HYDROCHLORIDE 1 MILLIGRAM(S): 2 INJECTION INTRAMUSCULAR; INTRAVENOUS; SUBCUTANEOUS at 20:17

## 2021-12-26 RX ADMIN — Medication 325 MILLIGRAM(S): at 11:27

## 2021-12-26 RX ADMIN — HYDROMORPHONE HYDROCHLORIDE 1 MILLIGRAM(S): 2 INJECTION INTRAMUSCULAR; INTRAVENOUS; SUBCUTANEOUS at 06:51

## 2021-12-26 RX ADMIN — Medication 650 MILLIGRAM(S): at 06:15

## 2021-12-26 RX ADMIN — Medication 1 GRAM(S): at 18:29

## 2021-12-26 RX ADMIN — Medication 10 MILLIEQUIVALENT(S): at 18:29

## 2021-12-26 RX ADMIN — Medication 2 CAPSULE(S): at 11:34

## 2021-12-26 RX ADMIN — Medication 1 MILLIGRAM(S): at 11:27

## 2021-12-26 RX ADMIN — TAMSULOSIN HYDROCHLORIDE 0.4 MILLIGRAM(S): 0.4 CAPSULE ORAL at 22:12

## 2021-12-26 RX ADMIN — HYDROMORPHONE HYDROCHLORIDE 1 MILLIGRAM(S): 2 INJECTION INTRAMUSCULAR; INTRAVENOUS; SUBCUTANEOUS at 14:59

## 2021-12-26 RX ADMIN — Medication 100 MILLIGRAM(S): at 11:27

## 2021-12-26 RX ADMIN — Medication 1 TABLET(S): at 11:27

## 2021-12-26 RX ADMIN — Medication 650 MILLIGRAM(S): at 13:33

## 2021-12-26 RX ADMIN — Medication 5 MILLIGRAM(S): at 08:17

## 2021-12-26 RX ADMIN — HYDROMORPHONE HYDROCHLORIDE 1 MILLIGRAM(S): 2 INJECTION INTRAMUSCULAR; INTRAVENOUS; SUBCUTANEOUS at 20:02

## 2021-12-26 RX ADMIN — HYDROMORPHONE HYDROCHLORIDE 1 MILLIGRAM(S): 2 INJECTION INTRAMUSCULAR; INTRAVENOUS; SUBCUTANEOUS at 02:43

## 2021-12-26 RX ADMIN — Medication 1 GRAM(S): at 02:36

## 2021-12-26 RX ADMIN — HYDROMORPHONE HYDROCHLORIDE 1 MILLIGRAM(S): 2 INJECTION INTRAMUSCULAR; INTRAVENOUS; SUBCUTANEOUS at 11:57

## 2021-12-26 RX ADMIN — PANTOPRAZOLE SODIUM 40 MILLIGRAM(S): 20 TABLET, DELAYED RELEASE ORAL at 06:15

## 2021-12-26 RX ADMIN — HEPARIN SODIUM 5000 UNIT(S): 5000 INJECTION INTRAVENOUS; SUBCUTANEOUS at 06:15

## 2021-12-26 RX ADMIN — SENNA PLUS 2 TABLET(S): 8.6 TABLET ORAL at 22:12

## 2021-12-26 RX ADMIN — HYDROMORPHONE HYDROCHLORIDE 1 MILLIGRAM(S): 2 INJECTION INTRAMUSCULAR; INTRAVENOUS; SUBCUTANEOUS at 15:29

## 2021-12-26 RX ADMIN — GABAPENTIN 100 MILLIGRAM(S): 400 CAPSULE ORAL at 06:15

## 2021-12-26 RX ADMIN — GABAPENTIN 100 MILLIGRAM(S): 400 CAPSULE ORAL at 18:26

## 2021-12-26 RX ADMIN — Medication 5 MILLIGRAM(S): at 11:32

## 2021-12-26 RX ADMIN — HYDROMORPHONE HYDROCHLORIDE 1 MILLIGRAM(S): 2 INJECTION INTRAMUSCULAR; INTRAVENOUS; SUBCUTANEOUS at 11:27

## 2021-12-26 RX ADMIN — Medication 1 GRAM(S): at 06:15

## 2021-12-26 RX ADMIN — Medication 2 CAPSULE(S): at 16:32

## 2021-12-26 RX ADMIN — Medication 650 MILLIGRAM(S): at 18:26

## 2021-12-26 NOTE — PROGRESS NOTE ADULT - SUBJECTIVE AND OBJECTIVE BOX
Patient is a 61y old  Male who presents with a chief complaint of abd pain (21 Dec 2021 10:06)    INTERVAL HPI/OVERNIGHT EVENTS: Patients seen and examined at bedside this morning. No acute events overnight.   multiple complaints, states pain starts in legs radiates to scapula and moves across his chest to his stomach and then up to his brain. States he went to see a spiritual man for his problems which have been there a "long time" and was told it's because he doesn't have enough endorphins in his body and he needs to get more endorphins.   hiccups improving   abd pain improved       Denies fever, chills, dizziness, HA, cough, CP, palpitations, SOB, dysuria.     MEDICATIONS  (STANDING):  heparin   Injectable 5000 Unit(s) SubCutaneous every 12 hours  pantoprazole  Injectable 40 milliGRAM(s) IV Push daily  potassium chloride  20 mEq/100 mL IVPB 20 milliEquivalent(s) IV Intermittent every 2 hours  sodium chloride 0.9%. 1000 milliLiter(s) (100 mL/Hr) IV Continuous <Continuous>    MEDICATIONS  (PRN):  HYDROmorphone  Injectable 0.5 milliGRAM(s) IV Push every 12 hours PRN Moderate Pain (4 - 6)  ondansetron Injectable 4 milliGRAM(s) IV Push every 6 hours PRN Vomiting    Allergies    No Known Allergies    Intolerances      Vital Signs Last 24 Hrs  T(C): 36.7 (26 Dec 2021 17:08), Max: 36.7 (26 Dec 2021 05:33)  T(F): 98 (26 Dec 2021 17:08), Max: 98.1 (26 Dec 2021 05:33)  HR: 74 (26 Dec 2021 17:08) (74 - 91)  BP: 148/68 (26 Dec 2021 17:08) (109/62 - 148/68)  BP(mean): --  RR: 18 (26 Dec 2021 17:08) (17 - 18)  SpO2: 100% (26 Dec 2021 17:08) (96% - 100%)      CAPILLARY BLOOD GLUCOSE        PHYSICAL EXAM:  GENERAL: NAD, no increased WOB   HEAD:  Atraumatic, Normocephalic  EYES: EOMI, PERRLA, conjunctiva and sclera clear  ENMT: No tonsillar erythema, exudates, or enlargement;   NECK: Supple, No JVD,  NERVOUS SYSTEM:  Alert & Oriented X3, Good concentration; nonfocal   CHEST/LUNG: CTAB  HEART: Regular rate and rhythm; No murmurs, rubs, or gallops  ABDOMEN: Soft, Nontender, Nondistended; Bowel sounds present; midline old well heeled scar   EXTREMITIES:  2+ Peripheral Pulses b/l, No clubbing, cyanosis, calf tenderness or edema b/l     Labs                                     7.9    8.10  )-----------( 403      ( 25 Dec 2021 08:54 )             26.5   12-    140  |  115<H>  |  41<H>  ----------------------------<  83  3.5   |  18<L>  |  4.03<H>    Ca    8.7      26 Dec 2021 09:02                    Urinalysis Basic - ( 20 Dec 2021 19:52 )    Color: Yellow / Appearance: Clear / S.010 / pH: x  Gluc: x / Ketone: Negative  / Bili: Negative / Urobili: Negative mg/dL   Blood: x / Protein: 30 mg/dL / Nitrite: Negative   Leuk Esterase: Negative / RBC: x / WBC 0-2   Sq Epi: x / Non Sq Epi: Occasional / Bacteria: Few    Consultant(s) Notes Reviewed [ x] Yes     Care Discussed with [x ] Consultants  [x ] Patient  [x ] Family--mother Dede [phone]  [x ] /   [x ] Other; RN  Brother josecristino [phone]

## 2021-12-26 NOTE — PROGRESS NOTE ADULT - SUBJECTIVE AND OBJECTIVE BOX
Patient is a 61y old  Male who presents with a chief complaint of abd pain (25 Dec 2021 21:39)      HPI:    61 yr  m,    h/o gout,  ex ETOH abuse, chronic pancreatitis, anemia, , CKD  takes  no meds  ep C s/p treatment, prior gastric perforation with peritonitis    resenting with lower   and  epigastic  pain . worsening  for 1 year.     patient  is  a poor historian,   denies any recent etoh useept known to renal (20 Dec 2021 18:23)      INTERVAL HPI/OVERNIGHT EVENTS:  spitting up recent food stuffs, ? retching. Pain everywhere. Refusing colonoscopy for tomorrow    MEDICATIONS  (STANDING):  cyanocobalamin 1000 MICROGram(s) Oral daily  ferrous    sulfate 325 milliGRAM(s) Oral daily  folic acid 1 milliGRAM(s) Oral daily  gabapentin 100 milliGRAM(s) Oral every 12 hours  heparin   Injectable 5000 Unit(s) SubCutaneous every 12 hours  metoclopramide 5 milliGRAM(s) Oral three times a day before meals  multivitamin 1 Tablet(s) Oral daily  pancrelipase  (CREON  6,000 Lipase Units) 2 Capsule(s) Oral three times a day with meals  pantoprazole    Tablet 40 milliGRAM(s) Oral two times a day  senna 2 Tablet(s) Oral at bedtime  sodium bicarbonate 650 milliGRAM(s) Oral every 8 hours  sucralfate 1 Gram(s) Oral four times a day  tamsulosin 0.4 milliGRAM(s) Oral at bedtime  thiamine 100 milliGRAM(s) Oral daily    MEDICATIONS  (PRN):  acetaminophen     Tablet .. 650 milliGRAM(s) Oral every 6 hours PRN Temp greater or equal to 38C (100.4F), Mild Pain (1 - 3)  HYDROmorphone  Injectable 1 milliGRAM(s) IV Push every 4 hours PRN Severe Pain (7 - 10)  LORazepam   Injectable 2 milliGRAM(s) IV Push every 2 hours PRN CIWA-Ar score 8 or greater  magnesium hydroxide Suspension 30 milliLiter(s) Oral daily PRN Constipation      FAMILY HISTORY:  No pertinent family history in first degree relatives        Allergies    No Known Allergies    Intolerances        PMH/PSH:  ETOH abuse    Pancreatitis    Hepatitis C    Gout    No significant past surgical history          REVIEW OF SYSTEMS:  CONSTITUTIONAL: No fever, weight loss,  EYES: No eye pain, visual disturbances, or discharge  ENMT:  No difficulty hearing, tinnitus, vertigo; No sinus or throat pain  NECK: No pain or stiffness  BREASTS: No pain, masses, or nipple discharge  RESPIRATORY: No cough, wheezing, chills or hemoptysis; No shortness of breath  CARDIOVASCULAR: No chest pain, palpitations, dizziness, or leg swelling  GASTROINTESTINAL:  see above   GENITOURINARY: No dysuria, frequency, hematuria, or incontinence  NEUROLOGICAL: No headaches, memory loss, loss of strength, numbness, or tremors  SKIN: No itching, burning, rashes, or lesions   LYMPH NODES: No enlarged glands  ENDOCRINE: No heat or cold intolerance; No hair loss  MUSCULOSKELETAL: No joint pain or swelling; No muscle, back, or extremity pain  PSYCHIATRIC: No depression, anxiety, mood swings, or difficulty sleeping  HEME/LYMPH: No easy bruising, or bleeding gums  ALLERGY AND IMMUNOLOGIC: No hives or eczema    Vital Signs Last 24 Hrs  T(C): 36.7 (26 Dec 2021 05:33), Max: 36.8 (25 Dec 2021 16:49)  T(F): 98.1 (26 Dec 2021 05:33), Max: 98.3 (25 Dec 2021 16:49)  HR: 91 (26 Dec 2021 05:33) (79 - 91)  BP: 129/72 (26 Dec 2021 05:33) (109/62 - 129/72)  BP(mean): --  RR: 17 (26 Dec 2021 05:33) (17 - 17)  SpO2: 99% (26 Dec 2021 05:33) (95% - 99%)    PHYSICAL EXAM:  GENERAL: NAD, well-groomed, well-developed  HEAD:  Atraumatic, Normocephalic  EYES: EOMI, PERRLA, conjunctiva and sclera clear  NECK: Supple, No JVD, Normal thyroid  NERVOUS SYSTEM:  Alert & Oriented X3, Good concentration; Motor Strength 5/5 B/L upper and lower extremities;   CHEST/LUNG: Clear to percussion bilaterally; No rales, rhonchi, wheezing, or rubs  HEART: Regular rate and rhythm; No murmurs, rubs, or gallops  ABDOMEN: Soft, Nontender, Nondistended; Bowel sounds present  EXTREMITIES:  2+ Peripheral Pulses, No clubbing, cyanosis, or edema  LYMPH: No lymphadenopathy noted  SKIN: No rashes or lesions    LAB  12-23 @ 01:19  amylase 309   lipase --                           7.9    8.10  )-----------( 403      ( 25 Dec 2021 08:54 )             26.5       CBC:  12-25 @ 08:54  WBC 8.10   Hgb 7.9   Hct 26.5   Plts 403  MCV 76.4  12-24 @ 08:30  WBC 6.61   Hgb 7.5   Hct 25.4   Plts 350  MCV 76.5  12-23 @ 08:15  WBC 9.65   Hgb 8.4   Hct 28.1   Plts 395  MCV 76.6  12-23 @ 01:19  WBC 10.34   Hgb 8.0   Hct 26.7   Plts 346  MCV 76.5  12-22 @ 07:24  WBC 12.16   Hgb 6.6   Hct 22.4   Plts 421  MCV 74.7  12-21 @ 07:00  WBC 12.30   Hgb 7.8   Hct 25.9   Plts 510  MCV 74.0  12-20 @ 15:21  WBC 10.46   Hgb 8.7   Hct 28.7   Plts 559  MCV 73.4      Chemistry:  12-25 @ 08:54  Na+ 139  K+ 3.4  Cl- 112  CO2 20  BUN 46  Cr 4.10     12-24 @ 08:30  Na+ 136  K+ 3.7  Cl- 109  CO2 20  BUN 51  Cr 4.30     12-23 @ 08:15  Na+ 140  K+ 3.7  Cl- 111  CO2 21  BUN 65  Cr 4.75     12-22 @ 07:24  Na+ 138  K+ 3.8  Cl- 110  CO2 19  BUN 76  Cr 5.68     12-21 @ 07:00  Na+ 137  K+ 3.1  Cl- 106  CO2 23  BUN 86  Cr 5.72     12-20 @ 15:21  Na+ 134  K+ 3.2  Cl- 100  CO2 21  BUN 90  Cr 6.28         Glucose, Serum: 94 mg/dL (12-25 @ 08:54)  Glucose, Serum: 89 mg/dL (12-24 @ 08:30)  Glucose, Serum: 108 mg/dL (12-23 @ 08:15)  Glucose, Serum: 109 mg/dL (12-22 @ 07:24)  Glucose, Serum: 110 mg/dL (12-21 @ 07:00)  Glucose, Serum: 108 mg/dL (12-20 @ 15:21)      25 Dec 2021 08:54    139    |  112    |  46     ----------------------------<  94     3.4     |  20     |  4.10   24 Dec 2021 08:30    136    |  109    |  51     ----------------------------<  89     3.7     |  20     |  4.30   23 Dec 2021 08:15    140    |  111    |  65     ----------------------------<  108    3.7     |  21     |  4.75   22 Dec 2021 07:24    138    |  110    |  76     ----------------------------<  109    3.8     |  19     |  5.68   21 Dec 2021 07:00    137    |  106    |  86     ----------------------------<  110    3.1     |  23     |  5.72   20 Dec 2021 15:21    134    |  100    |  90     ----------------------------<  108    3.2     |  21     |  6.28     Ca    8.5        25 Dec 2021 08:54  Ca    8.0        24 Dec 2021 08:30  Ca    8.4        23 Dec 2021 08:15  Ca    8.2        22 Dec 2021 07:24  Ca    8.3        21 Dec 2021 07:00  Ca    9.0        20 Dec 2021 15:21  Phos  3.2       24 Dec 2021 08:30  Phos  3.4       23 Dec 2021 08:15  Phos  3.0       22 Dec 2021 07:24  Mg     2.4       24 Dec 2021 08:30  Mg     2.1       23 Dec 2021 08:15  Mg     2.9       22 Dec 2021 07:24    TPro  6.2    /  Alb  2.5    /  TBili  0.1    /  DBili  x      /  AST  9      /  ALT  11     /  AlkPhos  123    22 Dec 2021 07:24  TPro  7.7    /  Alb  3.0    /  TBili  0.2    /  DBili  x      /  AST  15     /  ALT  10     /  AlkPhos  162    20 Dec 2021 15:21              CAPILLARY BLOOD GLUCOSE          C-Reactive Protein, Serum: <3 mg/L (12-21 @ 02:29)      RADIOLOGY & ADDITIONAL TESTS:    Imaging Personally Reviewed:  [ ] YES  [ ] NO    Consultant(s) Notes Reviewed:  [ ] YES  [ ] NO    Care Discussed with Consultants/Other Providers [ ] YES  [ ] NO

## 2021-12-26 NOTE — PROGRESS NOTE ADULT - ASSESSMENT
60 y/o M with hx of ETOH abuse, chronic pancreatitis, Hepatitis C, Gout, hx of gastric perforation in 2019, and hx of CKD with prior HD presents with nausea/vomiting admitted to medical for acute on chronic pancreatitis.    utox neg   CXR clear   EKG: NSR, LVH   CT C, T spine/ CT head - unremarkable       12/25 passed TOV, PVR <100cc   generalized pains and aches which are not new, pain control prn   12/26 plan for esophogram per GI tomorrow       Assessment and Plan:     Abdominal pain 2/2 Diffuse esophagitis and chronic pancreatitis  12/23 EGD: hiatal hernia, gastritis   CTAP : FLUID WITHIN A THICK-WALLED ESOPHAGUS. ASSOCIATED THICKENING OF THE   ARYEPIGLOTTIC FOLDS AND FALSE CORDS. moderate pancreatic atrophy with extensive   dystrophic calcification throughout, compatible with chronic   pancreatitis. There is severe dilatation of the pancreatic duct within   the body and tail secondary to creations in the pancreatic duct. There is   no peripancreatic stranding or fluid collection.  moderate hiatal hernia. There is moderate concentric   low-attenuation wall thickening of the esophagus with prominent mucosal   enhancement, suggestive of use esophagitis.  blood Cx --NGTD   tolerated regular diet   PRN pain regimen  GI following   PPI , carafate   antiemetic   creon    HypoKalemia --repleted     Anemia, THO   s/p 1u PRBC   no e/o bleeding or bruising   FOBT   venofer   iron supplement   H/H stable     YUNIEL HD dependent in the past; recovery with CKD 4 now with repeat YUNIEL - slowly better  Right kidney atropic, small left renal cyst  Nephro following   Adjust all meds as per CrCl  renal US: unremarkable   good urine output     Hepatitis C  Viral load  Not on meds currently  unknown treatment course, will need outpatient follow-up     BPH  passed TOV 12/25   flomax     ETOH dependence , not in w/d   CIWA monitoring with symptom trigger ativan prn for CIWA >8   thiamine, MVI, folic acid     Preventative measures   SCDs-dvt ppx  fall precautions   PT :CARMEN (discussed with mother and patient and both agreeable to STR)

## 2021-12-26 NOTE — PROGRESS NOTE ADULT - ASSESSMENT
HPI:    61 yr  m,    h/o gout,  ex ETOH abuse, chronic pancreatitis, anemia, , CKD  takes  no meds  ep C s/p treatment, prior gastric perforation with peritonitis    resenting with lower   and  epigastic  pain . worsening  for 1 year.     patient  is  a poor historian,   denies any recent etoh useept known to renal (20 Dec 2021 18:23)  ------- As Above ------------- Patient is a poor historian ( as is his mom )   The patient presents with N/V and abdominal pain x 3 days although mom / chart states this has a been a longer problem. History of ETOH abuse but has stopped. They deny  NSAIDs  Patient is not feeling any better over the past 24 hours. CT scan c/w chronic pancreatitis. Patient found to have significant elevated creatinine  States that he had a colonoscopy 3 years ago. History of hepatitis C that was treated.     A) N/V - Patient spitting / ? retching  B) abdominal pain - In association with leg,  chest and back. Pain on palpation over entire chest.  See EGD results - 1) check histology  2) Continue PPI / Carafate 4) Zofran PRN 5) Continue Creon 6) Will d/c Reglan around the clock   C) Fe def anemia -  getting iron - Patient refusing colonoscopy for Monday  D) history of hepatitis C  - 1) Bloods ordered.

## 2021-12-26 NOTE — PROGRESS NOTE ADULT - SUBJECTIVE AND OBJECTIVE BOX
No distress    Vital Signs Last 24 Hrs  T(C): 36.7 (12-26-21 @ 17:08), Max: 36.7 (12-26-21 @ 05:33)  T(F): 98 (12-26-21 @ 17:08), Max: 98.1 (12-26-21 @ 05:33)  HR: 74 (12-26-21 @ 17:08) (74 - 91)  BP: 148/68 (12-26-21 @ 17:08) (109/62 - 148/68)  RR: 18 (12-26-21 @ 17:08) (17 - 18)  SpO2: 100% (12-26-21 @ 17:08) (96% - 100%)    I&O's Detail    26 Dec 2021 07:01  -  26 Dec 2021 21:07  --------------------------------------------------------  OUT:    Voided (mL): 500 mL  Total OUT: 500 mL    s1s2  b/l air entry  soft. ND  no edema                                       7.9    8.10  )-----------( 403      ( 25 Dec 2021 08:54 )             26.5     26 Dec 2021 09:02    140    |  115    |  41     ----------------------------<  83     3.5     |  18     |  4.03     Ca    8.7        26 Dec 2021 09:02    acetaminophen     Tablet .. 650 milliGRAM(s) Oral every 6 hours PRN  cyanocobalamin 1000 MICROGram(s) Oral daily  ferrous    sulfate 325 milliGRAM(s) Oral daily  folic acid 1 milliGRAM(s) Oral daily  gabapentin 100 milliGRAM(s) Oral every 12 hours  heparin   Injectable 5000 Unit(s) SubCutaneous every 12 hours  HYDROmorphone  Injectable 1 milliGRAM(s) IV Push every 4 hours PRN  LORazepam   Injectable 2 milliGRAM(s) IV Push every 2 hours PRN  magnesium hydroxide Suspension 30 milliLiter(s) Oral daily PRN  metoclopramide 5 milliGRAM(s) Oral three times a day before meals  multivitamin 1 Tablet(s) Oral daily  pancrelipase  (CREON  6,000 Lipase Units) 2 Capsule(s) Oral three times a day with meals  pantoprazole    Tablet 40 milliGRAM(s) Oral two times a day  senna 2 Tablet(s) Oral at bedtime  sodium bicarbonate 650 milliGRAM(s) Oral every 6 hours  sucralfate 1 Gram(s) Oral four times a day  tamsulosin 0.4 milliGRAM(s) Oral at bedtime  thiamine 100 milliGRAM(s) Oral daily    Assessment/Plan:    S/p YUNIEL, hx HD dependent in the past  Recovery with CKD 4 now s/p repeat YUNIEL   Cr is improving  Dose meds for CrCl < 20  Diffuse esophagitis  Chronic pancreatitis  Off IVF  Avoid nephrotoxins  F/u [facility]    [phone]

## 2021-12-27 LAB
ALBUMIN SERPL ELPH-MCNC: 2.5 G/DL — LOW (ref 3.3–5)
ALP SERPL-CCNC: 99 U/L — SIGNIFICANT CHANGE UP (ref 40–120)
ALT FLD-CCNC: 9 U/L — LOW (ref 12–78)
ANION GAP SERPL CALC-SCNC: 8 MMOL/L — SIGNIFICANT CHANGE UP (ref 5–17)
AST SERPL-CCNC: 14 U/L — LOW (ref 15–37)
BILIRUB DIRECT SERPL-MCNC: 0.1 MG/DL — SIGNIFICANT CHANGE UP (ref 0–0.3)
BILIRUB INDIRECT FLD-MCNC: 0.1 MG/DL — LOW (ref 0.2–1)
BILIRUB SERPL-MCNC: 0.2 MG/DL — SIGNIFICANT CHANGE UP (ref 0.2–1.2)
BUN SERPL-MCNC: 38 MG/DL — HIGH (ref 7–23)
CALCIUM SERPL-MCNC: 8.4 MG/DL — LOW (ref 8.5–10.1)
CHLORIDE SERPL-SCNC: 114 MMOL/L — HIGH (ref 96–108)
CO2 SERPL-SCNC: 19 MMOL/L — LOW (ref 22–31)
CREAT SERPL-MCNC: 3.67 MG/DL — HIGH (ref 0.5–1.3)
FLUAV AG NPH QL: SIGNIFICANT CHANGE UP
FLUBV AG NPH QL: SIGNIFICANT CHANGE UP
GLUCOSE SERPL-MCNC: 96 MG/DL — SIGNIFICANT CHANGE UP (ref 70–99)
HCT VFR BLD CALC: 29.7 % — LOW (ref 39–50)
HCV RNA FLD QL NAA+PROBE: SIGNIFICANT CHANGE UP
HCV RNA FLD QL NAA+PROBE: SIGNIFICANT CHANGE UP
HGB BLD-MCNC: 8.7 G/DL — LOW (ref 13–17)
MAGNESIUM SERPL-MCNC: 2.2 MG/DL — SIGNIFICANT CHANGE UP (ref 1.6–2.6)
MCHC RBC-ENTMCNC: 22.8 PG — LOW (ref 27–34)
MCHC RBC-ENTMCNC: 29.3 GM/DL — LOW (ref 32–36)
MCV RBC AUTO: 77.7 FL — LOW (ref 80–100)
NRBC # BLD: 0 /100 WBCS — SIGNIFICANT CHANGE UP (ref 0–0)
PHOSPHATE SERPL-MCNC: 2.9 MG/DL — SIGNIFICANT CHANGE UP (ref 2.5–4.5)
PLATELET # BLD AUTO: 404 K/UL — HIGH (ref 150–400)
POTASSIUM SERPL-MCNC: 3.7 MMOL/L — SIGNIFICANT CHANGE UP (ref 3.5–5.3)
POTASSIUM SERPL-SCNC: 3.7 MMOL/L — SIGNIFICANT CHANGE UP (ref 3.5–5.3)
PROT SERPL-MCNC: 7 GM/DL — SIGNIFICANT CHANGE UP (ref 6–8.3)
RBC # BLD: 3.82 M/UL — LOW (ref 4.2–5.8)
RBC # FLD: 18.3 % — HIGH (ref 10.3–14.5)
SARS-COV-2 RNA SPEC QL NAA+PROBE: SIGNIFICANT CHANGE UP
SODIUM SERPL-SCNC: 141 MMOL/L — SIGNIFICANT CHANGE UP (ref 135–145)
WBC # BLD: 8.44 K/UL — SIGNIFICANT CHANGE UP (ref 3.8–10.5)
WBC # FLD AUTO: 8.44 K/UL — SIGNIFICANT CHANGE UP (ref 3.8–10.5)

## 2021-12-27 PROCEDURE — 99232 SBSQ HOSP IP/OBS MODERATE 35: CPT

## 2021-12-27 PROCEDURE — 74220 X-RAY XM ESOPHAGUS 1CNTRST: CPT | Mod: 26

## 2021-12-27 RX ORDER — IOHEXOL 300 MG/ML
30 INJECTION, SOLUTION INTRAVENOUS ONCE
Refills: 0 | Status: COMPLETED | OUTPATIENT
Start: 2021-12-27 | End: 2021-12-27

## 2021-12-27 RX ORDER — IOHEXOL 300 MG/ML
30 INJECTION, SOLUTION INTRAVENOUS ONCE
Refills: 0 | Status: DISCONTINUED | OUTPATIENT
Start: 2021-12-27 | End: 2021-12-31

## 2021-12-27 RX ADMIN — Medication 650 MILLIGRAM(S): at 00:25

## 2021-12-27 RX ADMIN — Medication 5 MILLIGRAM(S): at 17:56

## 2021-12-27 RX ADMIN — Medication 325 MILLIGRAM(S): at 12:28

## 2021-12-27 RX ADMIN — PANTOPRAZOLE SODIUM 40 MILLIGRAM(S): 20 TABLET, DELAYED RELEASE ORAL at 17:59

## 2021-12-27 RX ADMIN — HYDROMORPHONE HYDROCHLORIDE 1 MILLIGRAM(S): 2 INJECTION INTRAMUSCULAR; INTRAVENOUS; SUBCUTANEOUS at 05:25

## 2021-12-27 RX ADMIN — Medication 1 GRAM(S): at 18:01

## 2021-12-27 RX ADMIN — HEPARIN SODIUM 5000 UNIT(S): 5000 INJECTION INTRAVENOUS; SUBCUTANEOUS at 18:04

## 2021-12-27 RX ADMIN — GABAPENTIN 100 MILLIGRAM(S): 400 CAPSULE ORAL at 17:59

## 2021-12-27 RX ADMIN — HEPARIN SODIUM 5000 UNIT(S): 5000 INJECTION INTRAVENOUS; SUBCUTANEOUS at 05:05

## 2021-12-27 RX ADMIN — SENNA PLUS 2 TABLET(S): 8.6 TABLET ORAL at 22:17

## 2021-12-27 RX ADMIN — Medication 1 TABLET(S): at 12:26

## 2021-12-27 RX ADMIN — IOHEXOL 30 MILLILITER(S): 300 INJECTION, SOLUTION INTRAVENOUS at 16:24

## 2021-12-27 RX ADMIN — Medication 650 MILLIGRAM(S): at 18:02

## 2021-12-27 RX ADMIN — Medication 650 MILLIGRAM(S): at 05:06

## 2021-12-27 RX ADMIN — Medication 100 MILLIGRAM(S): at 12:25

## 2021-12-27 RX ADMIN — Medication 1 GRAM(S): at 00:25

## 2021-12-27 RX ADMIN — Medication 650 MILLIGRAM(S): at 12:29

## 2021-12-27 RX ADMIN — Medication 2 CAPSULE(S): at 12:29

## 2021-12-27 RX ADMIN — PANTOPRAZOLE SODIUM 40 MILLIGRAM(S): 20 TABLET, DELAYED RELEASE ORAL at 05:06

## 2021-12-27 RX ADMIN — Medication 2 CAPSULE(S): at 09:04

## 2021-12-27 RX ADMIN — HYDROMORPHONE HYDROCHLORIDE 1 MILLIGRAM(S): 2 INJECTION INTRAMUSCULAR; INTRAVENOUS; SUBCUTANEOUS at 23:31

## 2021-12-27 RX ADMIN — HYDROMORPHONE HYDROCHLORIDE 1 MILLIGRAM(S): 2 INJECTION INTRAMUSCULAR; INTRAVENOUS; SUBCUTANEOUS at 05:10

## 2021-12-27 RX ADMIN — PREGABALIN 1000 MICROGRAM(S): 225 CAPSULE ORAL at 12:28

## 2021-12-27 RX ADMIN — Medication 1 GRAM(S): at 05:06

## 2021-12-27 RX ADMIN — ERYTHROPOIETIN 10000 UNIT(S): 10000 INJECTION, SOLUTION INTRAVENOUS; SUBCUTANEOUS at 18:03

## 2021-12-27 RX ADMIN — HYDROMORPHONE HYDROCHLORIDE 1 MILLIGRAM(S): 2 INJECTION INTRAMUSCULAR; INTRAVENOUS; SUBCUTANEOUS at 09:11

## 2021-12-27 RX ADMIN — Medication 650 MILLIGRAM(S): at 23:08

## 2021-12-27 RX ADMIN — Medication 2 CAPSULE(S): at 17:58

## 2021-12-27 RX ADMIN — TAMSULOSIN HYDROCHLORIDE 0.4 MILLIGRAM(S): 0.4 CAPSULE ORAL at 22:25

## 2021-12-27 RX ADMIN — HYDROMORPHONE HYDROCHLORIDE 1 MILLIGRAM(S): 2 INJECTION INTRAMUSCULAR; INTRAVENOUS; SUBCUTANEOUS at 01:28

## 2021-12-27 RX ADMIN — Medication 1 GRAM(S): at 12:29

## 2021-12-27 RX ADMIN — Medication 1 MILLIGRAM(S): at 12:24

## 2021-12-27 RX ADMIN — Medication 1 GRAM(S): at 23:08

## 2021-12-27 RX ADMIN — HYDROMORPHONE HYDROCHLORIDE 1 MILLIGRAM(S): 2 INJECTION INTRAMUSCULAR; INTRAVENOUS; SUBCUTANEOUS at 23:16

## 2021-12-27 RX ADMIN — GABAPENTIN 100 MILLIGRAM(S): 400 CAPSULE ORAL at 05:06

## 2021-12-27 RX ADMIN — Medication 5 MILLIGRAM(S): at 12:35

## 2021-12-27 RX ADMIN — HYDROMORPHONE HYDROCHLORIDE 1 MILLIGRAM(S): 2 INJECTION INTRAMUSCULAR; INTRAVENOUS; SUBCUTANEOUS at 09:30

## 2021-12-27 RX ADMIN — HYDROMORPHONE HYDROCHLORIDE 1 MILLIGRAM(S): 2 INJECTION INTRAMUSCULAR; INTRAVENOUS; SUBCUTANEOUS at 01:12

## 2021-12-27 NOTE — CHART NOTE - NSCHARTNOTEFT_GEN_A_CORE
Unclear about esophogram results. Patient has no new GI symptoms and I performed an EGD last week and saw no obstruction - Was able to advance scope into the stomach with ease -  Chest CT ordered. Maintain NPO for now        < from: Xray Esophagram Single Contrast (12.27.21 @ 11:13) >      ACC: [id number] EXAM:  XR ESOPH SNGL CON STUDY                          PROCEDURE DATE:  12/27/2021          INTERPRETATION:  Esophagram    HISTORY: Reflux esophagitis    A single contrast esophagram was performed.    Interpretation: The proximal andmid esophagus is normal in course and   caliber. There is a high-grade obstruction at the distal esophagus which,   after five minute delay, allowed a small amount of contrast to pass into   the upper stomach.    IMPRESSION: High-grade obstruction, distal esophagus.    The above finding was conveyed by telephone to ROB Hodges at 2:55 PM on the   day of the study.    --- End of Report ---            MEAGAN JIMENEZ MD; Attending Interventional Radiologist  This document has been electronically signed.Dec 27 2021  2:56PM    < end of copied text >

## 2021-12-27 NOTE — PROGRESS NOTE ADULT - SUBJECTIVE AND OBJECTIVE BOX
[facility] NEPHROLOGY SERVICES, [facility]  NEPHROLOGY AND HYPERTENSION  [address]  [address]  [place] [address]  [phone]    MD HALIE OLIVIA MD ANDREY GONCHARUK, MD MADHU KORRAPATI, MD YELENA ROSENBERG, MD BINNY KOSHY, MD CHRISTOPHER CAPUTO, MD EDWARD BOVER, MD          Patient events noted  no distress  less hiccups       MEDICATIONS  (STANDING):  cyanocobalamin 1000 MICROGram(s) Oral daily  epoetin mejia-epbx (RETACRIT) Injectable 86406 Unit(s) SubCutaneous every 7 days  ferrous    sulfate 325 milliGRAM(s) Oral daily  folic acid 1 milliGRAM(s) Oral daily  gabapentin 100 milliGRAM(s) Oral every 12 hours  heparin   Injectable 5000 Unit(s) SubCutaneous every 12 hours  iohexol 300 mG (iodine)/mL Oral Solution 30 milliLiter(s) Oral once  metoclopramide 5 milliGRAM(s) Oral three times a day before meals  multivitamin 1 Tablet(s) Oral daily  pancrelipase  (CREON  6,000 Lipase Units) 2 Capsule(s) Oral three times a day with meals  pantoprazole    Tablet 40 milliGRAM(s) Oral two times a day  senna 2 Tablet(s) Oral at bedtime  sodium bicarbonate 650 milliGRAM(s) Oral every 6 hours  sucralfate 1 Gram(s) Oral four times a day  tamsulosin 0.4 milliGRAM(s) Oral at bedtime  thiamine 100 milliGRAM(s) Oral daily    MEDICATIONS  (PRN):  acetaminophen     Tablet .. 650 milliGRAM(s) Oral every 6 hours PRN Temp greater or equal to 38C (100.4F), Mild Pain (1 - 3)  HYDROmorphone  Injectable 1 milliGRAM(s) IV Push every 4 hours PRN Severe Pain (7 - 10)  LORazepam   Injectable 2 milliGRAM(s) IV Push every 2 hours PRN CIWA-Ar score 8 or greater  magnesium hydroxide Suspension 30 milliLiter(s) Oral daily PRN Constipation      12-26-21 @ 07:01  -  12-27-21 @ 07:00  --------------------------------------------------------  IN: 0 mL / OUT: 500 mL / NET: -500 mL    12-27-21 @ 07:01  -  12-27-21 @ 22:31  --------------------------------------------------------  IN: 360 mL / OUT: 800 mL / NET: -440 mL      PHYSICAL EXAM:      T(C): 36.6 (12-27-21 @ 17:20), Max: 36.6 (12-26-21 @ 23:59)  HR: 87 (12-27-21 @ 17:20) (74 - 87)  BP: 129/80 (12-27-21 @ 17:20) (129/80 - 144/97)  RR: 18 (12-27-21 @ 17:20) (17 - 18)  SpO2: 98% (12-27-21 @ 17:20) (98% - 100%)  Wt(kg): --  Lungs clear  Heart S1S2  Abd soft NT ND  Extremities:   tr edema                                    8.7    8.44  )-----------( 404      ( 27 Dec 2021 08:16 )             29.7     12-27    141  |  114<H>  |  38<H>  ----------------------------<  96  3.7   |  19<L>  |  3.67<H>    Ca    8.4<L>      27 Dec 2021 08:16  Phos  2.9     12-27  Mg     2.2     12-27    TPro  7.0  /  Alb  2.5<L>  /  TBili  0.2  /  DBili  0.1  /  AST  14<L>  /  ALT  9<L>  /  AlkPhos  99  12-27      LIVER FUNCTIONS - ( 27 Dec 2021 08:16 )  Alb: 2.5 g/dL / Pro: 7.0 gm/dL / ALK PHOS: 99 U/L / ALT: 9 U/L / AST: 14 U/L / GGT: x           Creatinine Trend: 3.67<--, 4.03<--, 4.10<--, 4.30<--, 4.75<--, 5.68<--      < from: Xray Esophagram Single Contrast (12.27.21 @ 11:13) >  IMPRESSION: High-grade obstruction, distal esophagus.    The above finding was conveyed by telephone to ROB Hodges at 2:55 PM on the   day of the study.    < end of copied text >    Assessment   YUNIEL CKD 4; pre renal azotemia  High grade distal esophagus obstruction      Plan:  Maintenance IVF  GI follow up    Austin Dukes MD

## 2021-12-27 NOTE — PROGRESS NOTE ADULT - SUBJECTIVE AND OBJECTIVE BOX
Patient is a 61y old  Male who presents with a chief complaint of abd pain (27 Dec 2021 12:13)      HPI:    61 yr  m,    h/o gout,  ex ETOH abuse, chronic pancreatitis, anemia, , CKD  takes  no meds  ep C s/p treatment, prior gastric perforation with peritonitis    resenting with lower   and  epigastic  pain . worsening  for 1 year.     patient  is  a poor historian,   denies any recent etoh useept known to renal (20 Dec 2021 18:23)      INTERVAL HPI/OVERNIGHT EVENTS: Patient seen earlier today  Minimal if any spitting. The patient denies melena, hematochezia, hematemesis, nausea, vomiting, abdominal pain, constipation, diarrhea, or change in bowel movements     MEDICATIONS  (STANDING):  cyanocobalamin 1000 MICROGram(s) Oral daily  epoetin mejia-epbx (RETACRIT) Injectable 14292 Unit(s) SubCutaneous every 7 days  ferrous    sulfate 325 milliGRAM(s) Oral daily  folic acid 1 milliGRAM(s) Oral daily  gabapentin 100 milliGRAM(s) Oral every 12 hours  heparin   Injectable 5000 Unit(s) SubCutaneous every 12 hours  metoclopramide 5 milliGRAM(s) Oral three times a day before meals  multivitamin 1 Tablet(s) Oral daily  pancrelipase  (CREON  6,000 Lipase Units) 2 Capsule(s) Oral three times a day with meals  pantoprazole    Tablet 40 milliGRAM(s) Oral two times a day  senna 2 Tablet(s) Oral at bedtime  sodium bicarbonate 650 milliGRAM(s) Oral every 6 hours  sucralfate 1 Gram(s) Oral four times a day  tamsulosin 0.4 milliGRAM(s) Oral at bedtime  thiamine 100 milliGRAM(s) Oral daily    MEDICATIONS  (PRN):  acetaminophen     Tablet .. 650 milliGRAM(s) Oral every 6 hours PRN Temp greater or equal to 38C (100.4F), Mild Pain (1 - 3)  HYDROmorphone  Injectable 1 milliGRAM(s) IV Push every 4 hours PRN Severe Pain (7 - 10)  LORazepam   Injectable 2 milliGRAM(s) IV Push every 2 hours PRN CIWA-Ar score 8 or greater  magnesium hydroxide Suspension 30 milliLiter(s) Oral daily PRN Constipation      FAMILY HISTORY:  No pertinent family history in first degree relatives        Allergies    No Known Allergies    Intolerances        PMH/PSH:  ETOH abuse    Pancreatitis    Hepatitis C    Gout    No significant past surgical history          REVIEW OF SYSTEMS:  CONSTITUTIONAL: No fever, weight loss,   EYES: No eye pain, visual disturbances, or discharge  ENMT:  No difficulty hearing, tinnitus, vertigo; No sinus or throat pain  NECK: No pain or stiffness  BREASTS: No pain, masses, or nipple discharge  RESPIRATORY: No cough, wheezing, chills or hemoptysis; No shortness of breath  CARDIOVASCULAR: No chest pain, palpitations, dizziness, or leg swelling  GASTROINTESTINAL: See above   GENITOURINARY: No dysuria, frequency, hematuria, or incontinence  NEUROLOGICAL: No headaches, memory loss, loss of strength, numbness, or tremors  SKIN: No itching, burning, rashes, or lesions   LYMPH NODES: No enlarged glands  ENDOCRINE: No heat or cold intolerance; No hair loss  MUSCULOSKELETAL: No joint pain or swelling; No muscle, back, or extremity pain  PSYCHIATRIC: No depression, anxiety, mood swings, or difficulty sleeping  HEME/LYMPH: No easy bruising, or bleeding gums  ALLERGY AND IMMUNOLOGIC: No hives or eczema    Vital Signs Last 24 Hrs  T(C): 36.6 (27 Dec 2021 12:07), Max: 36.7 (26 Dec 2021 17:08)  T(F): 97.8 (27 Dec 2021 12:07), Max: 98 (26 Dec 2021 17:08)  HR: 74 (27 Dec 2021 12:07) (72 - 80)  BP: 139/77 (27 Dec 2021 12:07) (119/74 - 148/68)  BP(mean): --  RR: 17 (27 Dec 2021 12:07) (17 - 18)  SpO2: 100% (27 Dec 2021 12:07) (100% - 100%)    PHYSICAL EXAM:  GENERAL: NAD, well-groomed, well-developed  HEAD:  Atraumatic, Normocephalic  EYES: EOMI, PERRLA, conjunctiva and sclera clear  NECK: Supple, No JVD, Normal thyroid  NERVOUS SYSTEM:  Alert & Oriented X3, Good concentration; Motor Strength 5/5 B/L upper and lower extremities;  CHEST/LUNG: Clear to percussion bilaterally; No rales, rhonchi, wheezing, or rubs  HEART: Regular rate and rhythm; No murmurs, rubs, or gallops  ABDOMEN: Soft, Nontender, Nondistended; Bowel sounds present  EXTREMITIES:  2+ Peripheral Pulses, No clubbing, cyanosis, or edema  LYMPH: No lymphadenopathy noted  SKIN: No rashes or lesions    LAB  12-23 @ 01:19  amylase 309   lipase --                           8.7    8.44  )-----------( 404      ( 27 Dec 2021 08:16 )             29.7       CBC:  12-27 @ 08:16  WBC 8.44   Hgb 8.7   Hct 29.7   Plts 404  MCV 77.7  12-25 @ 08:54  WBC 8.10   Hgb 7.9   Hct 26.5   Plts 403  MCV 76.4  12-24 @ 08:30  WBC 6.61   Hgb 7.5   Hct 25.4   Plts 350  MCV 76.5  12-23 @ 08:15  WBC 9.65   Hgb 8.4   Hct 28.1   Plts 395  MCV 76.6  12-23 @ 01:19  WBC 10.34   Hgb 8.0   Hct 26.7   Plts 346  MCV 76.5  12-22 @ 07:24  WBC 12.16   Hgb 6.6   Hct 22.4   Plts 421  MCV 74.7  12-21 @ 07:00  WBC 12.30   Hgb 7.8   Hct 25.9   Plts 510  MCV 74.0  12-20 @ 15:21  WBC 10.46   Hgb 8.7   Hct 28.7   Plts 559  MCV 73.4      Chemistry:  12-27 @ 08:16  Na+ 141  K+ 3.7  Cl- 114  CO2 19  BUN 38  Cr 3.67     12-26 @ 09:02  Na+ 140  K+ 3.5  Cl- 115  CO2 18  BUN 41  Cr 4.03     12-25 @ 08:54  Na+ 139  K+ 3.4  Cl- 112  CO2 20  BUN 46  Cr 4.10     12-24 @ 08:30  Na+ 136  K+ 3.7  Cl- 109  CO2 20  BUN 51  Cr 4.30     12-23 @ 08:15  Na+ 140  K+ 3.7  Cl- 111  CO2 21  BUN 65  Cr 4.75     12-22 @ 07:24  Na+ 138  K+ 3.8  Cl- 110  CO2 19  BUN 76  Cr 5.68     12-21 @ 07:00  Na+ 137  K+ 3.1  Cl- 106  CO2 23  BUN 86  Cr 5.72     12-20 @ 15:21  Na+ 134  K+ 3.2  Cl- 100  CO2 21  BUN 90  Cr 6.28         Glucose, Serum: 96 mg/dL (12-27 @ 08:16)  Glucose, Serum: 83 mg/dL (12-26 @ 09:02)  Glucose, Serum: 94 mg/dL (12-25 @ 08:54)  Glucose, Serum: 89 mg/dL (12-24 @ 08:30)  Glucose, Serum: 108 mg/dL (12-23 @ 08:15)  Glucose, Serum: 109 mg/dL (12-22 @ 07:24)  Glucose, Serum: 110 mg/dL (12-21 @ 07:00)  Glucose, Serum: 108 mg/dL (12-20 @ 15:21)      27 Dec 2021 08:16    141    |  114    |  38     ----------------------------<  96     3.7     |  19     |  3.67   26 Dec 2021 09:02    140    |  115    |  41     ----------------------------<  83     3.5     |  18     |  4.03   25 Dec 2021 08:54    139    |  112    |  46     ----------------------------<  94     3.4     |  20     |  4.10   24 Dec 2021 08:30    136    |  109    |  51     ----------------------------<  89     3.7     |  20     |  4.30   23 Dec 2021 08:15    140    |  111    |  65     ----------------------------<  108    3.7     |  21     |  4.75   22 Dec 2021 07:24    138    |  110    |  76     ----------------------------<  109    3.8     |  19     |  5.68   21 Dec 2021 07:00    137    |  106    |  86     ----------------------------<  110    3.1     |  23     |  5.72     Ca    8.4        27 Dec 2021 08:16  Ca    8.7        26 Dec 2021 09:02  Ca    8.5        25 Dec 2021 08:54  Ca    8.0        24 Dec 2021 08:30  Ca    8.4        23 Dec 2021 08:15  Ca    8.2        22 Dec 2021 07:24  Ca    8.3        21 Dec 2021 07:00  Phos  2.9       27 Dec 2021 08:16  Phos  3.2       24 Dec 2021 08:30  Phos  3.4       23 Dec 2021 08:15  Phos  3.0       22 Dec 2021 07:24  Mg     2.2       27 Dec 2021 08:16  Mg     2.4       24 Dec 2021 08:30  Mg     2.1       23 Dec 2021 08:15  Mg     2.9       22 Dec 2021 07:24    TPro  7.0    /  Alb  2.5    /  TBili  0.2    /  DBili  0.1    /  AST  14     /  ALT  9      /  AlkPhos  99     27 Dec 2021 08:16  TPro  6.2    /  Alb  2.5    /  TBili  0.1    /  DBili  x      /  AST  9      /  ALT  11     /  AlkPhos  123    22 Dec 2021 07:24  TPro  7.7    /  Alb  3.0    /  TBili  0.2    /  DBili  x      /  AST  15     /  ALT  10     /  AlkPhos  162    20 Dec 2021 15:21              CAPILLARY BLOOD GLUCOSE          C-Reactive Protein, Serum: <3 mg/L (12-21 @ 02:29)      RADIOLOGY & ADDITIONAL TESTS:    Imaging Personally Reviewed:  [ ] YES  [ ] NO    Consultant(s) Notes Reviewed:  [ ] YES  [ ] NO    Care Discussed with Consultants/Other Providers [ ] YES  [ ] NO

## 2021-12-27 NOTE — PROGRESS NOTE ADULT - ASSESSMENT
HPI:    61 yr  m,    h/o gout,  ex ETOH abuse, chronic pancreatitis, anemia, , CKD  takes  no meds  ep C s/p treatment, prior gastric perforation with peritonitis    resenting with lower   and  epigastic  pain . worsening  for 1 year.     patient  is  a poor historian,   denies any recent etoh useept known to renal (20 Dec 2021 18:23)  ------- As Above ------------- Patient is a poor historian ( as is his mom )   The patient presents with N/V and abdominal pain x 3 days although mom / chart states this has a been a longer problem. History of ETOH abuse but has stopped. They deny  NSAIDs  Patient is not feeling any better over the past 24 hours. CT scan c/w chronic pancreatitis. Patient found to have significant elevated creatinine  States that he had a colonoscopy 3 years ago. History of hepatitis C that was treated.     A) N/V - Patient spitting / ? retching - R/O Zenker's - Awaiting results of esophogram   B) abdominal pain - In association with leg,  chest and back. Pain on palpation over entire chest.  See EGD results - 1) check histology  2) Continue PPI / Carafate 4) Zofran PRN 5) Continue Creon 6) Will d/c Reglan around the clock   C) Fe def anemia -  getting iron - Patient refusing prep for colonoscopy. Dicussd importance of procedure. Will hold off on scheduling it for now.  D) history of hepatitis C  - Hep C (-) by RNA

## 2021-12-27 NOTE — CHART NOTE - NSCHARTNOTEFT_GEN_A_CORE
Pt seen for malnutrition follow-up. Per chart pt with PMH gout, ETOH abuse, chronic pancreatitis, anemia, CKD, YUNIEL with prior HD, gastric perforation with peritonitis, presents with epigastric pain, found with esophagitis, pancreatitis, and YUNIEL on CKD. s/p Gastroscopy with biopsy 12/23 with findings of gastritis, duodenitis, and hiatal hernia. Pending possible esophagram today (12/27).    Factors impacting intake: [ ] none [ ] nausea  [ ] vomiting [ ] diarrhea [ ] constipation  [ ]chewing problems [ ] swallowing issues  [x] other: NPO this morning for test    Diet Prescription: Diet, NPO after Midnight:      NPO Start Date: 26-Dec-2021,   NPO Start Time: 23:59  Except Medications  With Ice Chips/Sips of Water (12-26-21 @ 12:15)    Intake: NPO after midnight for test; after test this morning pt consumed % of breakfast as oer flow sheets    Current Weight: Weight (kg): 61.4 (12-20 @ 20:28)  % Weight Change: no recent weights to trend    No noted edema as per flow sheets.     Pertinent Medications: MEDICATIONS  (STANDING):  cyanocobalamin 1000 MICROGram(s) Oral daily  epoetin mejia-epbx (RETACRIT) Injectable 03667 Unit(s) SubCutaneous every 7 days  ferrous    sulfate 325 milliGRAM(s) Oral daily  folic acid 1 milliGRAM(s) Oral daily  gabapentin 100 milliGRAM(s) Oral every 12 hours  heparin   Injectable 5000 Unit(s) SubCutaneous every 12 hours  metoclopramide 5 milliGRAM(s) Oral three times a day before meals  multivitamin 1 Tablet(s) Oral daily  pancrelipase  (CREON  6,000 Lipase Units) 2 Capsule(s) Oral three times a day with meals  pantoprazole    Tablet 40 milliGRAM(s) Oral two times a day  senna 2 Tablet(s) Oral at bedtime  sodium bicarbonate 650 milliGRAM(s) Oral every 6 hours  sucralfate 1 Gram(s) Oral four times a day  tamsulosin 0.4 milliGRAM(s) Oral at bedtime  thiamine 100 milliGRAM(s) Oral daily    MEDICATIONS  (PRN):  acetaminophen     Tablet .. 650 milliGRAM(s) Oral every 6 hours PRN Temp greater or equal to 38C (100.4F), Mild Pain (1 - 3)  HYDROmorphone  Injectable 1 milliGRAM(s) IV Push every 4 hours PRN Severe Pain (7 - 10)  LORazepam   Injectable 2 milliGRAM(s) IV Push every 2 hours PRN CIWA-Ar score 8 or greater  magnesium hydroxide Suspension 30 milliLiter(s) Oral daily PRN Constipation    Pertinent Labs: 12-27 Na141 mmol/L Glu 96 mg/dL K+ 3.7 mmol/L Cr  3.67 mg/dL<H> BUN 38 mg/dL<H> 12-27 Phos 2.9 mg/dL 12-27 Alb 2.5 g/dL<L>      Skin: no pressure injuries as per flow sheets    Estimated Needs:   [x] no change since previous assessment: 12/22  [ ] recalculated:     Previous Nutrition Diagnosis:   [x] Severe malnutrition in the context of chronic illness.     Etiology Inadequate protein-energy intake in setting of EtOH use, chronic pancreatitis, anemia, CKD4.     Signs/Symptoms Physical findings of severe muscle wasting and fat loss.     Goal/Expected Outcome Once diet advanced pt to consume >75% meals/supplements once diet advanced. (met as per flow sheets)      Nutrition Diagnosis is [x] ongoing  [ ] resolved [ ] not applicable     New Nutrition Diagnosis: [x] not applicable      Interventions:   Recommend  [x] easy to chew, low sodium, low fat + Suplena x 2/day (provides 850 kcal, 22 g protein)   [ ] Change Diet To:  [ ] Nutrition Supplement  [ ] Nutrition Support  [x] Other: Continue to provide assistance and encouragement with PO intake     Monitoring and Evaluation:   [x] PO intake [ x ] Tolerance to diet prescription [ x ] weights [ x ] labs[ x ] follow up per protocol  [ ] other:

## 2021-12-27 NOTE — PROGRESS NOTE ADULT - SUBJECTIVE AND OBJECTIVE BOX
Patient is a 61y old  Male who presents with a chief complaint of abd pain (21 Dec 2021 10:06)    INTERVAL HPI/OVERNIGHT EVENTS: Patients seen and examined at bedside this morning. No acute events overnight.   multiple complaints, states pain starts in legs radiates to scapula and moves across his chest to his stomach and then up to his brain. States he went to see a spiritual man for his problems which have been there a "long time" and was told it's because he doesn't have enough endorphins in his body and he needs to get more endorphins.   hiccups improving   abd pain improved       Denies fever, chills, dizziness, HA, cough, CP, palpitations, SOB, dysuria.     MEDICATIONS  (STANDING):  heparin   Injectable 5000 Unit(s) SubCutaneous every 12 hours  pantoprazole  Injectable 40 milliGRAM(s) IV Push daily  potassium chloride  20 mEq/100 mL IVPB 20 milliEquivalent(s) IV Intermittent every 2 hours  sodium chloride 0.9%. 1000 milliLiter(s) (100 mL/Hr) IV Continuous <Continuous>    MEDICATIONS  (PRN):  HYDROmorphone  Injectable 0.5 milliGRAM(s) IV Push every 12 hours PRN Moderate Pain (4 - 6)  ondansetron Injectable 4 milliGRAM(s) IV Push every 6 hours PRN Vomiting    Allergies    No Known Allergies    Intolerances      Vital Signs Last 24 Hrs  T(C): 36.7 (26 Dec 2021 17:08), Max: 36.7 (26 Dec 2021 05:33)  T(F): 98 (26 Dec 2021 17:08), Max: 98.1 (26 Dec 2021 05:33)  HR: 74 (26 Dec 2021 17:08) (74 - 91)  BP: 148/68 (26 Dec 2021 17:08) (109/62 - 148/68)  BP(mean): --  RR: 18 (26 Dec 2021 17:08) (17 - 18)  SpO2: 100% (26 Dec 2021 17:08) (96% - 100%)      CAPILLARY BLOOD GLUCOSE        PHYSICAL EXAM:  GENERAL: NAD, no increased WOB   HEAD:  Atraumatic, Normocephalic  EYES: EOMI, PERRLA, conjunctiva and sclera clear  ENMT: No tonsillar erythema, exudates, or enlargement;   NECK: Supple, No JVD,  NERVOUS SYSTEM:  Alert & Oriented X3, Good concentration; nonfocal   CHEST/LUNG: CTAB  HEART: Regular rate and rhythm; No murmurs, rubs, or gallops  ABDOMEN: Soft, Nontender, Nondistended; Bowel sounds present; midline old well heeled scar   EXTREMITIES:  2+ Peripheral Pulses b/l, No clubbing, cyanosis, calf tenderness or edema b/l     Labs                                     7.9    8.10  )-----------( 403      ( 25 Dec 2021 08:54 )             26.5   12-    140  |  115<H>  |  41<H>  ----------------------------<  83  3.5   |  18<L>  |  4.03<H>    Ca    8.7      26 Dec 2021 09:02                    Urinalysis Basic - ( 20 Dec 2021 19:52 )    Color: Yellow / Appearance: Clear / S.010 / pH: x  Gluc: x / Ketone: Negative  / Bili: Negative / Urobili: Negative mg/dL   Blood: x / Protein: 30 mg/dL / Nitrite: Negative   Leuk Esterase: Negative / RBC: x / WBC 0-2   Sq Epi: x / Non Sq Epi: Occasional / Bacteria: Few    Consultant(s) Notes Reviewed [ x] Yes     Care Discussed with [x ] Consultants  [x ] Patient  [x ] Family--mother Dede [phone]  [x ] /   [x ] Other; RN  Brother josecristino [phone]

## 2021-12-27 NOTE — PROGRESS NOTE ADULT - ASSESSMENT
60 y/o M with hx of ETOH abuse, chronic pancreatitis, Hepatitis C, Gout, hx of gastric perforation in 2019, and hx of CKD with prior HD presents with nausea/vomiting admitted to medical for acute on chronic pancreatitis.    Assessment and Plan:     Abdominal pain 2/2 Diffuse esophagitis and chronic pancreatitis  12/23 EGD: hiatal hernia, gastritis   CTAP : FLUID WITHIN A THICK-WALLED ESOPHAGUS. ASSOCIATED THICKENING OF THE   ARYEPIGLOTTIC FOLDS AND FALSE CORDS. moderate pancreatic atrophy with extensive   dystrophic calcification throughout, compatible with chronic   pancreatitis. There is severe dilatation of the pancreatic duct within   the body and tail secondary to creations in the pancreatic duct. There is   no peripancreatic stranding or fluid collection.  moderate hiatal hernia. There is moderate concentric   low-attenuation wall thickening of the esophagus with prominent mucosal   enhancement, suggestive of use esophagitis.  blood Cx --NGTD   tolerated regular diet   PRN pain regimen  GI following   PPI , carafate   antiemetic   creon  Esophagram today?     HypoKalemia --repleted     Anemia, THO   s/p 1u PRBC   no e/o bleeding or bruising   FOBT   venofer   iron supplement   H/H stable     YUNIEL HD dependent in the past; recovery with CKD 4 now with repeat YUNIEL - slowly better  Right kidney atropic, small left renal cyst  Nephro following   Adjust all meds as per CrCl  renal US: unremarkable   good urine output     Hepatitis C  Viral load  Not on meds currently  unknown treatment course, will need outpatient follow-up     BPH  passed TOV 12/25   flomax     ETOH dependence , not in w/d   CIWA monitoring with symptom trigger ativan prn for CIWA >8   thiamine, MVI, folic acid     Preventative measures   SCDs-dvt ppx  fall precautions   PT :CARMEN (discussed with mother and patient and both agreeable to STR)

## 2021-12-28 LAB — SURGICAL PATHOLOGY STUDY: SIGNIFICANT CHANGE UP

## 2021-12-28 PROCEDURE — 71250 CT THORAX DX C-: CPT | Mod: 26

## 2021-12-28 PROCEDURE — 99232 SBSQ HOSP IP/OBS MODERATE 35: CPT

## 2021-12-28 RX ORDER — SODIUM CHLORIDE 9 MG/ML
1000 INJECTION, SOLUTION INTRAVENOUS
Refills: 0 | Status: DISCONTINUED | OUTPATIENT
Start: 2021-12-28 | End: 2021-12-30

## 2021-12-28 RX ADMIN — GABAPENTIN 100 MILLIGRAM(S): 400 CAPSULE ORAL at 05:20

## 2021-12-28 RX ADMIN — Medication 2 CAPSULE(S): at 08:27

## 2021-12-28 RX ADMIN — HYDROMORPHONE HYDROCHLORIDE 1 MILLIGRAM(S): 2 INJECTION INTRAMUSCULAR; INTRAVENOUS; SUBCUTANEOUS at 22:43

## 2021-12-28 RX ADMIN — HYDROMORPHONE HYDROCHLORIDE 1 MILLIGRAM(S): 2 INJECTION INTRAMUSCULAR; INTRAVENOUS; SUBCUTANEOUS at 04:20

## 2021-12-28 RX ADMIN — Medication 1 GRAM(S): at 05:21

## 2021-12-28 RX ADMIN — HEPARIN SODIUM 5000 UNIT(S): 5000 INJECTION INTRAVENOUS; SUBCUTANEOUS at 05:21

## 2021-12-28 RX ADMIN — Medication 1 TABLET(S): at 11:24

## 2021-12-28 RX ADMIN — HYDROMORPHONE HYDROCHLORIDE 1 MILLIGRAM(S): 2 INJECTION INTRAMUSCULAR; INTRAVENOUS; SUBCUTANEOUS at 13:46

## 2021-12-28 RX ADMIN — SENNA PLUS 2 TABLET(S): 8.6 TABLET ORAL at 22:43

## 2021-12-28 RX ADMIN — PANTOPRAZOLE SODIUM 40 MILLIGRAM(S): 20 TABLET, DELAYED RELEASE ORAL at 05:20

## 2021-12-28 RX ADMIN — Medication 1 GRAM(S): at 18:25

## 2021-12-28 RX ADMIN — Medication 2 CAPSULE(S): at 11:21

## 2021-12-28 RX ADMIN — Medication 650 MILLIGRAM(S): at 18:25

## 2021-12-28 RX ADMIN — Medication 650 MILLIGRAM(S): at 23:25

## 2021-12-28 RX ADMIN — HYDROMORPHONE HYDROCHLORIDE 1 MILLIGRAM(S): 2 INJECTION INTRAMUSCULAR; INTRAVENOUS; SUBCUTANEOUS at 08:28

## 2021-12-28 RX ADMIN — Medication 650 MILLIGRAM(S): at 05:21

## 2021-12-28 RX ADMIN — Medication 1 MILLIGRAM(S): at 11:24

## 2021-12-28 RX ADMIN — PANTOPRAZOLE SODIUM 40 MILLIGRAM(S): 20 TABLET, DELAYED RELEASE ORAL at 18:24

## 2021-12-28 RX ADMIN — Medication 1 GRAM(S): at 11:22

## 2021-12-28 RX ADMIN — HYDROMORPHONE HYDROCHLORIDE 1 MILLIGRAM(S): 2 INJECTION INTRAMUSCULAR; INTRAVENOUS; SUBCUTANEOUS at 18:23

## 2021-12-28 RX ADMIN — Medication 325 MILLIGRAM(S): at 11:22

## 2021-12-28 RX ADMIN — SODIUM CHLORIDE 100 MILLILITER(S): 9 INJECTION, SOLUTION INTRAVENOUS at 23:24

## 2021-12-28 RX ADMIN — PREGABALIN 1000 MICROGRAM(S): 225 CAPSULE ORAL at 11:22

## 2021-12-28 RX ADMIN — GABAPENTIN 100 MILLIGRAM(S): 400 CAPSULE ORAL at 18:24

## 2021-12-28 RX ADMIN — Medication 5 MILLIGRAM(S): at 11:22

## 2021-12-28 RX ADMIN — TAMSULOSIN HYDROCHLORIDE 0.4 MILLIGRAM(S): 0.4 CAPSULE ORAL at 22:43

## 2021-12-28 RX ADMIN — Medication 100 MILLIGRAM(S): at 11:24

## 2021-12-28 RX ADMIN — Medication 650 MILLIGRAM(S): at 11:21

## 2021-12-28 RX ADMIN — HYDROMORPHONE HYDROCHLORIDE 1 MILLIGRAM(S): 2 INJECTION INTRAMUSCULAR; INTRAVENOUS; SUBCUTANEOUS at 04:05

## 2021-12-28 RX ADMIN — Medication 5 MILLIGRAM(S): at 08:27

## 2021-12-28 RX ADMIN — Medication 2 CAPSULE(S): at 18:24

## 2021-12-28 RX ADMIN — Medication 1 GRAM(S): at 23:25

## 2021-12-28 RX ADMIN — HYDROMORPHONE HYDROCHLORIDE 1 MILLIGRAM(S): 2 INJECTION INTRAMUSCULAR; INTRAVENOUS; SUBCUTANEOUS at 23:00

## 2021-12-28 NOTE — PROGRESS NOTE ADULT - SUBJECTIVE AND OBJECTIVE BOX
[facility] NEPHROLOGY SERVICES, [facility]  NEPHROLOGY AND HYPERTENSION  [address]  [address]  [place] [address]  [phone]    MD HALIE OLIVIA MD ANDREY GONCHARUK, MD MADHU KORRAPATI, MD YELENA ROSENBERG, MD BINNY KOSHY, MD CHRISTOPHER CAPUTO, MD EDWARD BOVER, MD          Patient events noted    MEDICATIONS  (STANDING):  cyanocobalamin 1000 MICROGram(s) Oral daily  epoetin mejia-epbx (RETACRIT) Injectable 51908 Unit(s) SubCutaneous every 7 days  ferrous    sulfate 325 milliGRAM(s) Oral daily  folic acid 1 milliGRAM(s) Oral daily  gabapentin 100 milliGRAM(s) Oral every 12 hours  heparin   Injectable 5000 Unit(s) SubCutaneous every 12 hours  iohexol 300 mG (iodine)/mL Oral Solution 30 milliLiter(s) Oral once  lactated ringers. 1000 milliLiter(s) (100 mL/Hr) IV Continuous <Continuous>  multivitamin 1 Tablet(s) Oral daily  pancrelipase  (CREON  6,000 Lipase Units) 2 Capsule(s) Oral three times a day with meals  pantoprazole    Tablet 40 milliGRAM(s) Oral two times a day  senna 2 Tablet(s) Oral at bedtime  sodium bicarbonate 650 milliGRAM(s) Oral every 6 hours  sucralfate 1 Gram(s) Oral four times a day  tamsulosin 0.4 milliGRAM(s) Oral at bedtime  thiamine 100 milliGRAM(s) Oral daily    MEDICATIONS  (PRN):  acetaminophen     Tablet .. 650 milliGRAM(s) Oral every 6 hours PRN Temp greater or equal to 38C (100.4F), Mild Pain (1 - 3)  HYDROmorphone  Injectable 1 milliGRAM(s) IV Push every 4 hours PRN Severe Pain (7 - 10)  LORazepam   Injectable 2 milliGRAM(s) IV Push every 2 hours PRN CIWA-Ar score 8 or greater  magnesium hydroxide Suspension 30 milliLiter(s) Oral daily PRN Constipation      12-27-21 @ 07:01  -  12-28-21 @ 07:00  --------------------------------------------------------  IN: 600 mL / OUT: 800 mL / NET: -200 mL      PHYSICAL EXAM:      T(C): 36.6 (12-28-21 @ 17:29), Max: 36.8 (12-28-21 @ 05:04)  HR: 96 (12-28-21 @ 17:29) (89 - 96)  BP: 135/75 (12-28-21 @ 17:29) (109/69 - 145/81)  RR: 18 (12-28-21 @ 17:29) (18 - 19)  SpO2: 99% (12-28-21 @ 17:29) (96% - 99%)  Wt(kg): --  Lungs clear  Heart S1S2  Abd soft NT ND  Extremities:   tr edema                                    8.7    8.44  )-----------( 404      ( 27 Dec 2021 08:16 )             29.7     12-27    141  |  114<H>  |  38<H>  ----------------------------<  96  3.7   |  19<L>  |  3.67<H>    Ca    8.4<L>      27 Dec 2021 08:16  Phos  2.9     12-27  Mg     2.2     12-27    TPro  7.0  /  Alb  2.5<L>  /  TBili  0.2  /  DBili  0.1  /  AST  14<L>  /  ALT  9<L>  /  AlkPhos  99  12-27      LIVER FUNCTIONS - ( 27 Dec 2021 08:16 )  Alb: 2.5 g/dL / Pro: 7.0 gm/dL / ALK PHOS: 99 U/L / ALT: 9 U/L / AST: 14 U/L / GGT: x           Creatinine Trend: 3.67<--, 4.03<--, 4.10<--, 4.30<--, 4.75<--, 5.68<--      Assessment   YUNIEL CKD 3-4 pre renal azotemia  Esophageal mass    Plan:  Continue IVF   GI follow up    Austin Dukes MD

## 2021-12-28 NOTE — PROGRESS NOTE ADULT - SUBJECTIVE AND OBJECTIVE BOX
Patient is a 61y old  Male who presents with a chief complaint of abd pain (21 Dec 2021 10:06)    INTERVAL HPI/OVERNIGHT EVENTS: Patients seen and examined at bedside this morning. No acute events overnight.   abd pain improved. NPO       Denies fever, chills, dizziness, HA, cough, CP, palpitations, SOB, dysuria.     MEDICATIONS  (STANDING):  heparin   Injectable 5000 Unit(s) SubCutaneous every 12 hours  pantoprazole  Injectable 40 milliGRAM(s) IV Push daily  potassium chloride  20 mEq/100 mL IVPB 20 milliEquivalent(s) IV Intermittent every 2 hours  sodium chloride 0.9%. 1000 milliLiter(s) (100 mL/Hr) IV Continuous <Continuous>    MEDICATIONS  (PRN):  HYDROmorphone  Injectable 0.5 milliGRAM(s) IV Push every 12 hours PRN Moderate Pain (4 - 6)  ondansetron Injectable 4 milliGRAM(s) IV Push every 6 hours PRN Vomiting    Allergies    No Known Allergies    Intolerances      Vital Signs Last 24 Hrs  T(C): 36.7 (26 Dec 2021 17:08), Max: 36.7 (26 Dec 2021 05:33)  T(F): 98 (26 Dec 2021 17:08), Max: 98.1 (26 Dec 2021 05:33)  HR: 74 (26 Dec 2021 17:08) (74 - 91)  BP: 148/68 (26 Dec 2021 17:08) (109/62 - 148/68)  BP(mean): --  RR: 18 (26 Dec 2021 17:08) (17 - 18)  SpO2: 100% (26 Dec 2021 17:08) (96% - 100%)      CAPILLARY BLOOD GLUCOSE        PHYSICAL EXAM:  GENERAL: NAD, no increased WOB   HEAD:  Atraumatic, Normocephalic  EYES: EOMI, PERRLA, conjunctiva and sclera clear  ENMT: No tonsillar erythema, exudates, or enlargement;   NECK: Supple, No JVD,  NERVOUS SYSTEM:  Alert & Oriented X3, Good concentration; nonfocal   CHEST/LUNG: CTAB  HEART: Regular rate and rhythm; No murmurs, rubs, or gallops  ABDOMEN: Soft, Nontender, Nondistended; Bowel sounds present; midline old well heeled scar   EXTREMITIES:  2+ Peripheral Pulses b/l, No clubbing, cyanosis, calf tenderness or edema b/l     Labs                                     7.9    8.10  )-----------( 403      ( 25 Dec 2021 08:54 )             26.5   12-26    140  |  115<H>  |  41<H>  ----------------------------<  83  3.5   |  18<L>  |  4.03<H>    Ca    8.7      26 Dec 2021 09:02                    Urinalysis Basic - ( 20 Dec 2021 19:52 )    Color: Yellow / Appearance: Clear / S.010 / pH: x  Gluc: x / Ketone: Negative  / Bili: Negative / Urobili: Negative mg/dL   Blood: x / Protein: 30 mg/dL / Nitrite: Negative   Leuk Esterase: Negative / RBC: x / WBC 0-2   Sq Epi: x / Non Sq Epi: Occasional / Bacteria: Few    Consultant(s) Notes Reviewed [ x] Yes     Care Discussed with [x ] Consultants  [x ] Patient  [x ] Family--mother Dede [phone]  [x ] /   [x ] Other; RN  Brother josecristino [phone]

## 2021-12-28 NOTE — PROGRESS NOTE ADULT - ASSESSMENT
HPI:    61 yr  m,    h/o gout,  ex ETOH abuse, chronic pancreatitis, anemia, , CKD  takes  no meds  ep C s/p treatment, prior gastric perforation with peritonitis    resenting with lower   and  epigastic  pain . worsening  for 1 year.     patient  is  a poor historian,   denies any recent etoh useept known to renal (20 Dec 2021 18:23)  ------- As Above ------------- Patient is a poor historian ( as is his mom )   The patient presents with N/V and abdominal pain x 3 days although mom / chart states this has a been a longer problem. History of ETOH abuse but has stopped. They deny  NSAIDs  Patient is not feeling any better over the past 24 hours. CT scan c/w chronic pancreatitis. Patient found to have significant elevated creatinine  States that he had a colonoscopy 3 years ago. History of hepatitis C that was treated.     A) N/V - Patient spitting / ? retching - R/O Zenker's - Esophogram - C/W obstruction but EGD last week revealed an essentially negative esophagus- Awaiting chest CT result -  called three times  B) abdominal pain - In association with leg,  chest and back. Pain on palpation over entire chest.  See EGD results - 1) check histology  2) Continue PPI / Carafate 4) Zofran PRN 5) Continue Creon 6) Will d/c Reglan around the clock   C) Fe def anemia -  getting iron PO - Patient refusing prep for colonoscopy. Discussed importance of procedure. Will hold off on scheduling it for now.  D) history of hepatitis C  - Hep C (-) by RNA HPI:    61 yr  m,    h/o gout,  ex ETOH abuse, chronic pancreatitis, anemia, , CKD  takes  no meds  ep C s/p treatment, prior gastric perforation with peritonitis    resenting with lower   and  epigastic  pain . worsening  for 1 year.     patient  is  a poor historian,   denies any recent etoh useept known to renal (20 Dec 2021 18:23)  ------- As Above ------------- Patient is a poor historian ( as is his mom )   The patient presents with N/V and abdominal pain x 3 days although mom / chart states this has a been a longer problem. History of ETOH abuse but has stopped. They deny  NSAIDs  Patient is not feeling any better over the past 24 hours. CT scan c/w chronic pancreatitis. Patient found to have significant elevated creatinine  States that he had a colonoscopy 3 years ago. History of hepatitis C that was treated.     A) N/V - Patient spitting / ? retching - R/O Zenker's - Esophogram - C/W obstruction but EGD last week revealed an essentially negative esophagus- Awaiting chest CT result -  called three times  B) abdominal pain - In association with leg,  chest and back. Pain on palpation over entire chest.  See EGD results - Histology was negative 1) Continue PPI / Carafate 2) Zofran PRN 3) Continue Creon 4) Will d/c Reglan around the clock   C) Fe def anemia -  getting iron PO - Patient refusing prep for colonoscopy. Discussed importance of procedure. Will hold off on scheduling it for now.  D) history of hepatitis C  - Hep C (-) by RNA

## 2021-12-28 NOTE — PROGRESS NOTE ADULT - ASSESSMENT
60 y/o M with hx of ETOH abuse, chronic pancreatitis, Hepatitis C, Gout, hx of gastric perforation in 2019, and hx of CKD with prior HD presents with nausea/vomiting admitted to medical for acute on chronic pancreatitis.    Assessment and Plan:     Abdominal pain 2/2 Diffuse esophagitis and chronic pancreatitis  12/23 EGD: hiatal hernia, gastritis   CTAP : FLUID WITHIN A THICK-WALLED ESOPHAGUS. ASSOCIATED THICKENING OF THE   ARYEPIGLOTTIC FOLDS AND FALSE CORDS. moderate pancreatic atrophy with extensive   dystrophic calcification throughout, compatible with chronic   pancreatitis. There is severe dilatation of the pancreatic duct within   the body and tail secondary to creations in the pancreatic duct. There is   no peripancreatic stranding or fluid collection.  moderate hiatal hernia. There is moderate concentric   low-attenuation wall thickening of the esophagus with prominent mucosal   enhancement, suggestive of use esophagitis.  blood Cx --NGTD   tolerated regular diet   PRN pain regimen  GI following   PPI , carafate   antiemetic   creon  Esophagram 12/27 w/ possible distal obstruction, CT w/ oral contrast today. EGD last week unrevealing of any obstruction.     HypoKalemia --repleted     Anemia, THO   s/p 1u PRBC   no e/o bleeding or bruising   FOBT   venofer   iron supplement   H/H stable     YUNIEL HD dependent in the past; recovery with CKD 4 now with repeat YUNIEL - slowly better  Right kidney atropic, small left renal cyst  Nephro following   Adjust all meds as per CrCl  renal US: unremarkable   good urine output     Hepatitis C  Viral load  Not on meds currently  unknown treatment course, will need outpatient follow-up     BPH  passed TOV 12/25   flomax     ETOH dependence , not in w/d   CIWA monitoring with symptom trigger ativan prn for CIWA >8   thiamine, MVI, folic acid     Preventative measures   SCDs-dvt ppx  fall precautions   PT :CARMEN, now patient is refusing CARMEN.

## 2021-12-29 ENCOUNTER — TRANSCRIPTION ENCOUNTER (OUTPATIENT)
Age: 61
End: 2021-12-29

## 2021-12-29 LAB
ANION GAP SERPL CALC-SCNC: 7 MMOL/L — SIGNIFICANT CHANGE UP (ref 5–17)
BUN SERPL-MCNC: 37 MG/DL — HIGH (ref 7–23)
CALCIUM SERPL-MCNC: 8.2 MG/DL — LOW (ref 8.5–10.1)
CHLORIDE SERPL-SCNC: 110 MMOL/L — HIGH (ref 96–108)
CO2 SERPL-SCNC: 21 MMOL/L — LOW (ref 22–31)
CREAT SERPL-MCNC: 3.93 MG/DL — HIGH (ref 0.5–1.3)
GLUCOSE SERPL-MCNC: 157 MG/DL — HIGH (ref 70–99)
POTASSIUM SERPL-MCNC: 3.8 MMOL/L — SIGNIFICANT CHANGE UP (ref 3.5–5.3)
POTASSIUM SERPL-SCNC: 3.8 MMOL/L — SIGNIFICANT CHANGE UP (ref 3.5–5.3)
SODIUM SERPL-SCNC: 138 MMOL/L — SIGNIFICANT CHANGE UP (ref 135–145)

## 2021-12-29 PROCEDURE — 99232 SBSQ HOSP IP/OBS MODERATE 35: CPT

## 2021-12-29 RX ORDER — CHLORPROMAZINE HCL 10 MG
25 TABLET ORAL EVERY 8 HOURS
Refills: 0 | Status: DISCONTINUED | OUTPATIENT
Start: 2021-12-29 | End: 2021-12-31

## 2021-12-29 RX ADMIN — Medication 2 CAPSULE(S): at 12:34

## 2021-12-29 RX ADMIN — GABAPENTIN 100 MILLIGRAM(S): 400 CAPSULE ORAL at 17:25

## 2021-12-29 RX ADMIN — Medication 1 GRAM(S): at 12:39

## 2021-12-29 RX ADMIN — HYDROMORPHONE HYDROCHLORIDE 1 MILLIGRAM(S): 2 INJECTION INTRAMUSCULAR; INTRAVENOUS; SUBCUTANEOUS at 08:35

## 2021-12-29 RX ADMIN — PREGABALIN 1000 MICROGRAM(S): 225 CAPSULE ORAL at 12:34

## 2021-12-29 RX ADMIN — Medication 1 TABLET(S): at 12:34

## 2021-12-29 RX ADMIN — Medication 650 MILLIGRAM(S): at 05:31

## 2021-12-29 RX ADMIN — HYDROMORPHONE HYDROCHLORIDE 1 MILLIGRAM(S): 2 INJECTION INTRAMUSCULAR; INTRAVENOUS; SUBCUTANEOUS at 12:32

## 2021-12-29 RX ADMIN — Medication 650 MILLIGRAM(S): at 18:12

## 2021-12-29 RX ADMIN — HYDROMORPHONE HYDROCHLORIDE 1 MILLIGRAM(S): 2 INJECTION INTRAMUSCULAR; INTRAVENOUS; SUBCUTANEOUS at 17:38

## 2021-12-29 RX ADMIN — Medication 100 MILLIGRAM(S): at 12:35

## 2021-12-29 RX ADMIN — TAMSULOSIN HYDROCHLORIDE 0.4 MILLIGRAM(S): 0.4 CAPSULE ORAL at 21:50

## 2021-12-29 RX ADMIN — HYDROMORPHONE HYDROCHLORIDE 1 MILLIGRAM(S): 2 INJECTION INTRAMUSCULAR; INTRAVENOUS; SUBCUTANEOUS at 03:35

## 2021-12-29 RX ADMIN — Medication 1 GRAM(S): at 18:13

## 2021-12-29 RX ADMIN — Medication 1 GRAM(S): at 05:31

## 2021-12-29 RX ADMIN — GABAPENTIN 100 MILLIGRAM(S): 400 CAPSULE ORAL at 05:31

## 2021-12-29 RX ADMIN — Medication 25 MILLIGRAM(S): at 21:57

## 2021-12-29 RX ADMIN — HYDROMORPHONE HYDROCHLORIDE 1 MILLIGRAM(S): 2 INJECTION INTRAMUSCULAR; INTRAVENOUS; SUBCUTANEOUS at 21:50

## 2021-12-29 RX ADMIN — HYDROMORPHONE HYDROCHLORIDE 1 MILLIGRAM(S): 2 INJECTION INTRAMUSCULAR; INTRAVENOUS; SUBCUTANEOUS at 17:23

## 2021-12-29 RX ADMIN — HYDROMORPHONE HYDROCHLORIDE 1 MILLIGRAM(S): 2 INJECTION INTRAMUSCULAR; INTRAVENOUS; SUBCUTANEOUS at 12:47

## 2021-12-29 RX ADMIN — Medication 2 CAPSULE(S): at 08:30

## 2021-12-29 RX ADMIN — Medication 1 MILLIGRAM(S): at 12:34

## 2021-12-29 RX ADMIN — SENNA PLUS 2 TABLET(S): 8.6 TABLET ORAL at 21:50

## 2021-12-29 RX ADMIN — HEPARIN SODIUM 5000 UNIT(S): 5000 INJECTION INTRAVENOUS; SUBCUTANEOUS at 05:32

## 2021-12-29 RX ADMIN — Medication 325 MILLIGRAM(S): at 12:34

## 2021-12-29 RX ADMIN — PANTOPRAZOLE SODIUM 40 MILLIGRAM(S): 20 TABLET, DELAYED RELEASE ORAL at 17:24

## 2021-12-29 RX ADMIN — Medication 650 MILLIGRAM(S): at 12:35

## 2021-12-29 RX ADMIN — PANTOPRAZOLE SODIUM 40 MILLIGRAM(S): 20 TABLET, DELAYED RELEASE ORAL at 05:31

## 2021-12-29 RX ADMIN — HYDROMORPHONE HYDROCHLORIDE 1 MILLIGRAM(S): 2 INJECTION INTRAMUSCULAR; INTRAVENOUS; SUBCUTANEOUS at 22:05

## 2021-12-29 RX ADMIN — HYDROMORPHONE HYDROCHLORIDE 1 MILLIGRAM(S): 2 INJECTION INTRAMUSCULAR; INTRAVENOUS; SUBCUTANEOUS at 08:18

## 2021-12-29 RX ADMIN — HEPARIN SODIUM 5000 UNIT(S): 5000 INJECTION INTRAVENOUS; SUBCUTANEOUS at 17:25

## 2021-12-29 RX ADMIN — HYDROMORPHONE HYDROCHLORIDE 1 MILLIGRAM(S): 2 INJECTION INTRAMUSCULAR; INTRAVENOUS; SUBCUTANEOUS at 03:19

## 2021-12-29 RX ADMIN — Medication 2 CAPSULE(S): at 18:19

## 2021-12-29 NOTE — DISCHARGE NOTE PROVIDER - PROVIDER TOKENS
PROVIDER:[TOKEN:[[id number]]],PROVIDER:[TOKEN:[[id number]]],FREE:[LAST:[PCP],PHONE:[(   )    -],FAX:[(   )    -]]

## 2021-12-29 NOTE — PROGRESS NOTE ADULT - SUBJECTIVE AND OBJECTIVE BOX
Patient is a 61y old  Male who presents with a chief complaint of abd pain (21 Dec 2021 10:06)    INTERVAL HPI/OVERNIGHT EVENTS: Patients seen and examined at bedside this morning. No acute events overnight.   abd pain improved.      Denies fever, chills, dizziness, HA, cough, CP, palpitations, SOB, dysuria.     MEDICATIONS  (STANDING):  heparin   Injectable 5000 Unit(s) SubCutaneous every 12 hours  pantoprazole  Injectable 40 milliGRAM(s) IV Push daily  potassium chloride  20 mEq/100 mL IVPB 20 milliEquivalent(s) IV Intermittent every 2 hours  sodium chloride 0.9%. 1000 milliLiter(s) (100 mL/Hr) IV Continuous <Continuous>    MEDICATIONS  (PRN):  HYDROmorphone  Injectable 0.5 milliGRAM(s) IV Push every 12 hours PRN Moderate Pain (4 - 6)  ondansetron Injectable 4 milliGRAM(s) IV Push every 6 hours PRN Vomiting    Allergies    No Known Allergies    Intolerances      Vital Signs Last 24 Hrs  T(C): 36.8 (29 Dec 2021 10:19), Max: 36.8 (29 Dec 2021 10:19)  T(F): 98.2 (29 Dec 2021 10:19), Max: 98.2 (29 Dec 2021 10:19)  HR: 91 (29 Dec 2021 10:19) (74 - 96)  BP: 133/87 (29 Dec 2021 10:19) (121/68 - 145/81)  BP(mean): --  RR: 18 (29 Dec 2021 10:19) (18 - 18)  SpO2: 99% (29 Dec 2021 10:19) (96% - 99%)      CAPILLARY BLOOD GLUCOSE        PHYSICAL EXAM:  GENERAL: NAD, no increased WOB   HEAD:  Atraumatic, Normocephalic  EYES: EOMI, PERRLA, conjunctiva and sclera clear  ENMT: No tonsillar erythema, exudates, or enlargement;   NECK: Supple, No JVD,  NERVOUS SYSTEM:  Alert & Oriented X3, Good concentration; nonfocal   CHEST/LUNG: CTAB  HEART: Regular rate and rhythm; No murmurs, rubs, or gallops  ABDOMEN: Soft, Nontender, Nondistended; Bowel sounds present; midline old well heeled scar   EXTREMITIES:  2+ Peripheral Pulses b/l, No clubbing, cyanosis, calf tenderness or edema b/l     Labs                                     7.9    8.10  )-----------( 403      ( 25 Dec 2021 08:54 )             26.5   12-26    140  |  115<H>  |  41<H>  ----------------------------<  83  3.5   |  18<L>  |  4.03<H>    Ca    8.7      26 Dec 2021 09:02                    Urinalysis Basic - ( 20 Dec 2021 19:52 )    Color: Yellow / Appearance: Clear / S.010 / pH: x  Gluc: x / Ketone: Negative  / Bili: Negative / Urobili: Negative mg/dL   Blood: x / Protein: 30 mg/dL / Nitrite: Negative   Leuk Esterase: Negative / RBC: x / WBC 0-2   Sq Epi: x / Non Sq Epi: Occasional / Bacteria: Few    Consultant(s) Notes Reviewed [ x] Yes     Care Discussed with [x ] Consultants  [x ] Patient  [x ] Family--mother Dede [phone]  [x ] /   [x ] Other; RN  Brother josecristino [phone]

## 2021-12-29 NOTE — PROGRESS NOTE ADULT - ASSESSMENT
60 y/o M with hx of ETOH abuse, chronic pancreatitis, Hepatitis C, Gout, hx of gastric perforation in 2019, and hx of CKD with prior HD presents with nausea/vomiting admitted to medical for acute on chronic pancreatitis.    Assessment and Plan:     Abdominal pain 2/2 Diffuse esophagitis and chronic pancreatitis  12/23 EGD: hiatal hernia, gastritis   CTAP : FLUID WITHIN A THICK-WALLED ESOPHAGUS. ASSOCIATED THICKENING OF THE   ARYEPIGLOTTIC FOLDS AND FALSE CORDS. moderate pancreatic atrophy with extensive   dystrophic calcification throughout, compatible with chronic   pancreatitis. There is severe dilatation of the pancreatic duct within   the body and tail secondary to creations in the pancreatic duct. There is   no peripancreatic stranding or fluid collection.  moderate hiatal hernia. There is moderate concentric   low-attenuation wall thickening of the esophagus with prominent mucosal   enhancement, suggestive of use esophagitis.  blood Cx --NGTD    PRN pain regimen  GI following   PPI , carafate   antiemetic   creon  Esophagram 12/27 w/ possible distal obstruction, CT w/ oral contrast 12/28 /w possible mass in distal esophagus. EGD last week unrevealing of any obstruction.   Currently on soft diet    HypoKalemia --repleted     Anemia, THO   s/p 1u PRBC   no e/o bleeding or bruising   FOBT   venofer   iron supplement   H/H stable     YUNIEL HD dependent in the past; recovery with CKD 4 now with repeat YUNIEL - slowly better  Right kidney atropic, small left renal cyst  Nephro following   Adjust all meds as per CrCl  renal US: unremarkable   good urine output     Hepatitis C  Viral load  Not on meds currently  unknown treatment course, will need outpatient follow-up     BPH  passed TOV 12/25   flomax     ETOH dependence , not in w/d   CIWA monitoring with symptom trigger ativan prn for CIWA >8   thiamine, MVI, folic acid     Preventative measures   SCDs-dvt ppx  fall precautions   PT :CARMEN, now patient is refusing CARMEN. Likely Home care pending GI workup.

## 2021-12-29 NOTE — DISCHARGE NOTE PROVIDER - NSDCMRMEDTOKEN_GEN_ALL_CORE_FT
amLODIPine 10 mg oral tablet: 1 tab(s) orally once a day  calcium acetate 667 mg oral tablet: 1 tab(s) orally 3 times a day   amLODIPine 10 mg oral tablet: 1 tab(s) orally once a day  calcium acetate 667 mg oral tablet: 1 tab(s) orally 3 times a day  cyanocobalamin 1000 mcg oral tablet: 1 tab(s) orally once a day  folic acid 1 mg oral tablet: 1 tab(s) orally once a day  gabapentin 100 mg oral capsule: 1 cap(s) orally every 12 hours MDD:2 caps a day  Multiple Vitamins oral tablet: 1 tab(s) orally once a day  oxycodone-acetaminophen 5 mg-325 mg oral tablet: 2 tab(s) orally every 6 hours, As Needed -Severe Pain (7 - 10) MDD:4 tabs a day.  pancrelipase 6000 units-19,000 units-30,000 units oral delayed release capsule: 2 cap(s) orally 3 times a day (with meals)  pantoprazole 40 mg oral delayed release tablet: 1 tab(s) orally 2 times a day  physical therapy: Physical therapy 3-5x/week for 5weeks. Dx: unsteady gait  senna oral tablet: 2 tab(s) orally once a day (at bedtime)  sodium bicarbonate 650 mg oral tablet: 1 tab(s) orally every 6 hours  sucralfate 1 g oral tablet: 1 tab(s) orally 4 times a day  tamsulosin 0.4 mg oral capsule: 1 cap(s) orally once a day (at bedtime)  thiamine 100 mg oral tablet: 1 tab(s) orally once a day   amLODIPine 10 mg oral tablet: 1 tab(s) orally once a day  calcium acetate 667 mg oral tablet: 1 tab(s) orally 3 times a day  cyanocobalamin 1000 mcg oral tablet: 1 tab(s) orally once a day  folic acid 1 mg oral tablet: 1 tab(s) orally once a day  gabapentin 100 mg oral capsule: 1 cap(s) orally every 12 hours MDD:2 caps a day  Multiple Vitamins oral tablet: 1 tab(s) orally once a day  pancrelipase 6000 units-19,000 units-30,000 units oral delayed release capsule: 2 cap(s) orally 3 times a day (with meals)  pantoprazole 40 mg oral delayed release tablet: 1 tab(s) orally 2 times a day  physical therapy: Physical therapy 3-5x/week for 5weeks. Dx: unsteady gait  senna oral tablet: 2 tab(s) orally once a day (at bedtime)  sodium bicarbonate 650 mg oral tablet: 1 tab(s) orally every 6 hours  sucralfate 1 g oral tablet: 1 tab(s) orally 4 times a day  tamsulosin 0.4 mg oral capsule: 1 cap(s) orally once a day (at bedtime)  thiamine 100 mg oral tablet: 1 tab(s) orally once a day

## 2021-12-29 NOTE — PROGRESS NOTE ADULT - SUBJECTIVE AND OBJECTIVE BOX
Patient is a 61y old  Male who presents with a chief complaint of abd pain (29 Dec 2021 14:42)      HPI:    61 yr  m,    h/o gout,  ex ETOH abuse, chronic pancreatitis, anemia, , CKD  takes  no meds  ep C s/p treatment, prior gastric perforation with peritonitis    resenting with lower   and  epigastic  pain . worsening  for 1 year.     patient  is  a poor historian,   denies any recent etoh useept known to renal (20 Dec 2021 18:23)      INTERVAL HPI/OVERNIGHT EVENTS:  pain all over body is better. The patient is still spitting. The patient denies melena, hematochezia, hematemesis, nausea, vomiting, abdominal pain, constipation, diarrhea, or change in bowel movements     MEDICATIONS  (STANDING):  cyanocobalamin 1000 MICROGram(s) Oral daily  epoetin mejia-epbx (RETACRIT) Injectable 96996 Unit(s) SubCutaneous every 7 days  ferrous    sulfate 325 milliGRAM(s) Oral daily  folic acid 1 milliGRAM(s) Oral daily  gabapentin 100 milliGRAM(s) Oral every 12 hours  heparin   Injectable 5000 Unit(s) SubCutaneous every 12 hours  iohexol 300 mG (iodine)/mL Oral Solution 30 milliLiter(s) Oral once  lactated ringers. 1000 milliLiter(s) (100 mL/Hr) IV Continuous <Continuous>  multivitamin 1 Tablet(s) Oral daily  pancrelipase  (CREON  6,000 Lipase Units) 2 Capsule(s) Oral three times a day with meals  pantoprazole    Tablet 40 milliGRAM(s) Oral two times a day  senna 2 Tablet(s) Oral at bedtime  sodium bicarbonate 650 milliGRAM(s) Oral every 6 hours  sucralfate 1 Gram(s) Oral four times a day  tamsulosin 0.4 milliGRAM(s) Oral at bedtime  thiamine 100 milliGRAM(s) Oral daily    MEDICATIONS  (PRN):  acetaminophen     Tablet .. 650 milliGRAM(s) Oral every 6 hours PRN Temp greater or equal to 38C (100.4F), Mild Pain (1 - 3)  HYDROmorphone  Injectable 1 milliGRAM(s) IV Push every 4 hours PRN Severe Pain (7 - 10)  LORazepam   Injectable 2 milliGRAM(s) IV Push every 2 hours PRN CIWA-Ar score 8 or greater  magnesium hydroxide Suspension 30 milliLiter(s) Oral daily PRN Constipation      FAMILY HISTORY:  No pertinent family history in first degree relatives        Allergies    No Known Allergies    Intolerances        PMH/PSH:  ETOH abuse    Pancreatitis    Hepatitis C    Gout    No significant past surgical history          REVIEW OF SYSTEMS:  CONSTITUTIONAL: No fever, weight loss,  EYES: No eye pain, visual disturbances, or discharge  ENMT:  No difficulty hearing, tinnitus, vertigo; No sinus or throat pain  NECK: No pain or stiffness  BREASTS: No pain, masses, or nipple discharge  RESPIRATORY: No cough, wheezing, chills or hemoptysis; No shortness of breath  CARDIOVASCULAR: No chest pain, palpitations, dizziness, or leg swelling  GASTROINTESTINAL: See above   GENITOURINARY: No dysuria, frequency, hematuria, or incontinence  NEUROLOGICAL: No headaches, memory loss, loss of strength, numbness, or tremors  SKIN: No itching, burning, rashes, or lesions   LYMPH NODES: No enlarged glands  ENDOCRINE: No heat or cold intolerance; No hair loss  MUSCULOSKELETAL: No joint pain or swelling; No muscle, back, or extremity pain  PSYCHIATRIC: No depression, anxiety, mood swings, or difficulty sleeping  HEME/LYMPH: No easy bruising, or bleeding gums  ALLERGY AND IMMUNOLOGIC: No hives or eczema    Vital Signs Last 24 Hrs  T(C): 36.8 (29 Dec 2021 10:19), Max: 36.8 (29 Dec 2021 10:19)  T(F): 98.2 (29 Dec 2021 10:19), Max: 98.2 (29 Dec 2021 10:19)  HR: 91 (29 Dec 2021 10:19) (74 - 96)  BP: 133/87 (29 Dec 2021 10:19) (121/68 - 135/75)  BP(mean): --  RR: 18 (29 Dec 2021 10:19) (18 - 18)  SpO2: 99% (29 Dec 2021 10:19) (98% - 99%)    PHYSICAL EXAM:  GENERAL: NAD, well-groomed, well-developed  HEAD:  Atraumatic, Normocephalic  EYES: EOMI, PERRLA, conjunctiva and sclera clear  NECK: Supple, No JVD, Normal thyroid  NERVOUS SYSTEM:  Alert & Oriented X3, Good concentration; Motor Strength 5/5 B/L upper and lower extremities;   CHEST/LUNG: Clear to percussion bilaterally; No rales, rhonchi, wheezing, or rubs  HEART: Regular rate and rhythm; No murmurs, rubs, or gallops  ABDOMEN: Soft, Nontender, Nondistended; Bowel sounds present  EXTREMITIES:  2+ Peripheral Pulses, No clubbing, cyanosis, or edema  LYMPH: No lymphadenopathy noted  SKIN: No rashes or lesions    LAB        CBC:  12-27 @ 08:16  WBC 8.44   Hgb 8.7   Hct 29.7   Plts 404  MCV 77.7  12-25 @ 08:54  WBC 8.10   Hgb 7.9   Hct 26.5   Plts 403  MCV 76.4  12-24 @ 08:30  WBC 6.61   Hgb 7.5   Hct 25.4   Plts 350  MCV 76.5  12-23 @ 08:15  WBC 9.65   Hgb 8.4   Hct 28.1   Plts 395  MCV 76.6  12-23 @ 01:19  WBC 10.34   Hgb 8.0   Hct 26.7   Plts 346  MCV 76.5      Chemistry:  12-29 @ 07:34  Na+ 138  K+ 3.8  Cl- 110  CO2 21  BUN 37  Cr 3.93     12-27 @ 08:16  Na+ 141  K+ 3.7  Cl- 114  CO2 19  BUN 38  Cr 3.67     12-26 @ 09:02  Na+ 140  K+ 3.5  Cl- 115  CO2 18  BUN 41  Cr 4.03     12-25 @ 08:54  Na+ 139  K+ 3.4  Cl- 112  CO2 20  BUN 46  Cr 4.10     12-24 @ 08:30  Na+ 136  K+ 3.7  Cl- 109  CO2 20  BUN 51  Cr 4.30     12-23 @ 08:15  Na+ 140  K+ 3.7  Cl- 111  CO2 21  BUN 65  Cr 4.75         Glucose, Serum: 157 mg/dL (12-29 @ 07:34)  Glucose, Serum: 96 mg/dL (12-27 @ 08:16)  Glucose, Serum: 83 mg/dL (12-26 @ 09:02)  Glucose, Serum: 94 mg/dL (12-25 @ 08:54)  Glucose, Serum: 89 mg/dL (12-24 @ 08:30)  Glucose, Serum: 108 mg/dL (12-23 @ 08:15)      29 Dec 2021 07:34    138    |  110    |  37     ----------------------------<  157    3.8     |  21     |  3.93   27 Dec 2021 08:16    141    |  114    |  38     ----------------------------<  96     3.7     |  19     |  3.67   26 Dec 2021 09:02    140    |  115    |  41     ----------------------------<  83     3.5     |  18     |  4.03   25 Dec 2021 08:54    139    |  112    |  46     ----------------------------<  94     3.4     |  20     |  4.10   24 Dec 2021 08:30    136    |  109    |  51     ----------------------------<  89     3.7     |  20     |  4.30   23 Dec 2021 08:15    140    |  111    |  65     ----------------------------<  108    3.7     |  21     |  4.75     Ca    8.2        29 Dec 2021 07:34  Ca    8.4        27 Dec 2021 08:16  Ca    8.7        26 Dec 2021 09:02  Ca    8.5        25 Dec 2021 08:54  Ca    8.0        24 Dec 2021 08:30  Ca    8.4        23 Dec 2021 08:15  Phos  2.9       27 Dec 2021 08:16  Phos  3.2       24 Dec 2021 08:30  Phos  3.4       23 Dec 2021 08:15  Mg     2.2       27 Dec 2021 08:16  Mg     2.4       24 Dec 2021 08:30  Mg     2.1       23 Dec 2021 08:15    TPro  7.0    /  Alb  2.5    /  TBili  0.2    /  DBili  0.1    /  AST  14     /  ALT  9      /  AlkPhos  99     27 Dec 2021 08:16              CAPILLARY BLOOD GLUCOSE              RADIOLOGY & ADDITIONAL TESTS:    Imaging Personally Reviewed:  [ ] YES  [ ] NO    Consultant(s) Notes Reviewed:  [ ] YES  [ ] NO    Care Discussed with Consultants/Other Providers [ ] YES  [ ] NO

## 2021-12-29 NOTE — PROGRESS NOTE ADULT - SUBJECTIVE AND OBJECTIVE BOX
Patient is a 61y old  Male who presents with a chief complaint of abd pain (29 Dec 2021 12:25)      HPI:    61 yr  m,    h/o gout,  ex ETOH abuse, chronic pancreatitis, anemia, , CKD  takes  no meds  ep C s/p treatment, prior gastric perforation with peritonitis    resenting with lower   and  epigastic  pain . worsening  for 1 year.     patient  is  a poor historian,   denies any recent etoh useept known to renal (20 Dec 2021 18:23)      INTERVAL HPI/OVERNIGHT EVENTS:    MEDICATIONS  (STANDING):  cyanocobalamin 1000 MICROGram(s) Oral daily  epoetin mejia-epbx (RETACRIT) Injectable 30553 Unit(s) SubCutaneous every 7 days  ferrous    sulfate 325 milliGRAM(s) Oral daily  folic acid 1 milliGRAM(s) Oral daily  gabapentin 100 milliGRAM(s) Oral every 12 hours  heparin   Injectable 5000 Unit(s) SubCutaneous every 12 hours  iohexol 300 mG (iodine)/mL Oral Solution 30 milliLiter(s) Oral once  lactated ringers. 1000 milliLiter(s) (100 mL/Hr) IV Continuous <Continuous>  multivitamin 1 Tablet(s) Oral daily  pancrelipase  (CREON  6,000 Lipase Units) 2 Capsule(s) Oral three times a day with meals  pantoprazole    Tablet 40 milliGRAM(s) Oral two times a day  senna 2 Tablet(s) Oral at bedtime  sodium bicarbonate 650 milliGRAM(s) Oral every 6 hours  sucralfate 1 Gram(s) Oral four times a day  tamsulosin 0.4 milliGRAM(s) Oral at bedtime  thiamine 100 milliGRAM(s) Oral daily    MEDICATIONS  (PRN):  acetaminophen     Tablet .. 650 milliGRAM(s) Oral every 6 hours PRN Temp greater or equal to 38C (100.4F), Mild Pain (1 - 3)  HYDROmorphone  Injectable 1 milliGRAM(s) IV Push every 4 hours PRN Severe Pain (7 - 10)  LORazepam   Injectable 2 milliGRAM(s) IV Push every 2 hours PRN CIWA-Ar score 8 or greater  magnesium hydroxide Suspension 30 milliLiter(s) Oral daily PRN Constipation      FAMILY HISTORY:  No pertinent family history in first degree relatives        Allergies    No Known Allergies    Intolerances        PMH/PSH:  ETOH abuse    Pancreatitis    Hepatitis C    Gout    No significant past surgical history          REVIEW OF SYSTEMS:  CONSTITUTIONAL: No fever, weight loss, or fatigue  EYES: No eye pain, visual disturbances, or discharge  ENMT:  No difficulty hearing, tinnitus, vertigo; No sinus or throat pain  NECK: No pain or stiffness  BREASTS: No pain, masses, or nipple discharge  RESPIRATORY: No cough, wheezing, chills or hemoptysis; No shortness of breath  CARDIOVASCULAR: No chest pain, palpitations, dizziness, or leg swelling  GASTROINTESTINAL: No abdominal or epigastric pain. No nausea, vomiting, or hematemesis; No diarrhea or constipation. No melena or hematochezia.  GENITOURINARY: No dysuria, frequency, hematuria, or incontinence  NEUROLOGICAL: No headaches, memory loss, loss of strength, numbness, or tremors  SKIN: No itching, burning, rashes, or lesions   LYMPH NODES: No enlarged glands  ENDOCRINE: No heat or cold intolerance; No hair loss  MUSCULOSKELETAL: No joint pain or swelling; No muscle, back, or extremity pain  PSYCHIATRIC: No depression, anxiety, mood swings, or difficulty sleeping  HEME/LYMPH: No easy bruising, or bleeding gums  ALLERGY AND IMMUNOLOGIC: No hives or eczema    Vital Signs Last 24 Hrs  T(C): 36.8 (29 Dec 2021 10:19), Max: 36.8 (29 Dec 2021 10:19)  T(F): 98.2 (29 Dec 2021 10:19), Max: 98.2 (29 Dec 2021 10:19)  HR: 91 (29 Dec 2021 10:19) (74 - 96)  BP: 133/87 (29 Dec 2021 10:19) (121/68 - 135/75)  BP(mean): --  RR: 18 (29 Dec 2021 10:19) (18 - 18)  SpO2: 99% (29 Dec 2021 10:19) (98% - 99%)    PHYSICAL EXAM:  GENERAL: NAD, well-groomed, well-developed  HEAD:  Atraumatic, Normocephalic  EYES: EOMI, PERRLA, conjunctiva and sclera clear  ENMT: No tonsillar erythema, exudates, or enlargement; Moist mucous membranes, Good dentition, No lesions  NECK: Supple, No JVD, Normal thyroid  NERVOUS SYSTEM:  Alert & Oriented X3, Good concentration; Motor Strength 5/5 B/L upper and lower extremities; DTRs 2+ intact and symmetric  CHEST/LUNG: Clear to percussion bilaterally; No rales, rhonchi, wheezing, or rubs  HEART: Regular rate and rhythm; No murmurs, rubs, or gallops  ABDOMEN: Soft, Nontender, Nondistended; Bowel sounds present  EXTREMITIES:  2+ Peripheral Pulses, No clubbing, cyanosis, or edema  LYMPH: No lymphadenopathy noted  SKIN: No rashes or lesions    LAB        CBC:  12-27 @ 08:16  WBC 8.44   Hgb 8.7   Hct 29.7   Plts 404  MCV 77.7  12-25 @ 08:54  WBC 8.10   Hgb 7.9   Hct 26.5   Plts 403  MCV 76.4  12-24 @ 08:30  WBC 6.61   Hgb 7.5   Hct 25.4   Plts 350  MCV 76.5  12-23 @ 08:15  WBC 9.65   Hgb 8.4   Hct 28.1   Plts 395  MCV 76.6  12-23 @ 01:19  WBC 10.34   Hgb 8.0   Hct 26.7   Plts 346  MCV 76.5      Chemistry:  12-29 @ 07:34  Na+ 138  K+ 3.8  Cl- 110  CO2 21  BUN 37  Cr 3.93     12-27 @ 08:16  Na+ 141  K+ 3.7  Cl- 114  CO2 19  BUN 38  Cr 3.67     12-26 @ 09:02  Na+ 140  K+ 3.5  Cl- 115  CO2 18  BUN 41  Cr 4.03     12-25 @ 08:54  Na+ 139  K+ 3.4  Cl- 112  CO2 20  BUN 46  Cr 4.10     12-24 @ 08:30  Na+ 136  K+ 3.7  Cl- 109  CO2 20  BUN 51  Cr 4.30     12-23 @ 08:15  Na+ 140  K+ 3.7  Cl- 111  CO2 21  BUN 65  Cr 4.75         Glucose, Serum: 157 mg/dL (12-29 @ 07:34)  Glucose, Serum: 96 mg/dL (12-27 @ 08:16)  Glucose, Serum: 83 mg/dL (12-26 @ 09:02)  Glucose, Serum: 94 mg/dL (12-25 @ 08:54)  Glucose, Serum: 89 mg/dL (12-24 @ 08:30)  Glucose, Serum: 108 mg/dL (12-23 @ 08:15)      29 Dec 2021 07:34    138    |  110    |  37     ----------------------------<  157    3.8     |  21     |  3.93   27 Dec 2021 08:16    141    |  114    |  38     ----------------------------<  96     3.7     |  19     |  3.67   26 Dec 2021 09:02    140    |  115    |  41     ----------------------------<  83     3.5     |  18     |  4.03   25 Dec 2021 08:54    139    |  112    |  46     ----------------------------<  94     3.4     |  20     |  4.10   24 Dec 2021 08:30    136    |  109    |  51     ----------------------------<  89     3.7     |  20     |  4.30   23 Dec 2021 08:15    140    |  111    |  65     ----------------------------<  108    3.7     |  21     |  4.75     Ca    8.2        29 Dec 2021 07:34  Ca    8.4        27 Dec 2021 08:16  Ca    8.7        26 Dec 2021 09:02  Ca    8.5        25 Dec 2021 08:54  Ca    8.0        24 Dec 2021 08:30  Ca    8.4        23 Dec 2021 08:15  Phos  2.9       27 Dec 2021 08:16  Phos  3.2       24 Dec 2021 08:30  Phos  3.4       23 Dec 2021 08:15  Mg     2.2       27 Dec 2021 08:16  Mg     2.4       24 Dec 2021 08:30  Mg     2.1       23 Dec 2021 08:15    TPro  7.0    /  Alb  2.5    /  TBili  0.2    /  DBili  0.1    /  AST  14     /  ALT  9      /  AlkPhos  99     27 Dec 2021 08:16              CAPILLARY BLOOD GLUCOSE              RADIOLOGY & ADDITIONAL TESTS:    Imaging Personally Reviewed:  [ ] YES  [ ] NO    Consultant(s) Notes Reviewed:  [ ] YES  [ ] NO    Care Discussed with Consultants/Other Providers [ ] YES  [ ] NO

## 2021-12-29 NOTE — DISCHARGE NOTE PROVIDER - CARE PROVIDERS DIRECT ADDRESSES
,DirectAddress_Unknown,[email or web address],DirectAddress_Unknown

## 2021-12-29 NOTE — PROGRESS NOTE ADULT - ASSESSMENT
HPI:    61 yr  m,    h/o gout,  ex ETOH abuse, chronic pancreatitis, anemia, , CKD  takes  no meds  ep C s/p treatment, prior gastric perforation with peritonitis    resenting with lower   and  epigastic  pain . worsening  for 1 year.     patient  is  a poor historian,   denies any recent etoh useept known to renal (20 Dec 2021 18:23)  ------- As Above ------------- Patient is a poor historian ( as is his mom )   The patient presents with N/V and abdominal pain x 3 days although mom / chart states this has a been a longer problem. History of ETOH abuse but has stopped. They deny  NSAIDs  Patient is not feeling any better over the past 24 hours. CT scan c/w chronic pancreatitis. Patient found to have significant elevated creatinine  States that he had a colonoscopy 3 years ago. History of hepatitis C that was treated.     A) N/V - Patient spitting. Reviewed all the films with Dr Buck. No obstruction or mass seen. == patient probably has a dysmotility problem   B) abdominal pain - Resolved.   See EGD results - Histology was negative  Continue PPI    C) Fe def anemia -  getting iron PO - Patient refusing prep for colonoscopy. Discussed importance of procedure. Will hold off on scheduling it for now.  D) Chronic pancreatitis  - Continue Creon   E) Hiccups - > 4 days - Thorazine x 3 days  D) history of hepatitis C  - Hep C (-) by RNA

## 2021-12-29 NOTE — DISCHARGE NOTE PROVIDER - HOSPITAL COURSE
62 y/o M with hx of ETOH abuse, chronic pancreatitis, Hepatitis C, Gout, hx of gastric perforation in 2019, and hx of CKD with prior HD presents with nausea/vomiting admitted to medical for acute on chronic pancreatitis. CTAP showed fluid within thick-walled esophagus, chronic pancreatitis, severe dilatation of pancreatic duct, esophagitis. Patient was started on PPI and carafate, continued on creon. GI consulted. Esophagram on 12/27 showed possible distal obstruction, CT w/ oral contrast 12/28 /w possible mass in distal esophagus. EGD last week unrevealing of any obstruction. Hospital course noted for anemia (THO) s/p 1u prbc continued on iron supplement without signs of bleeding/bruising.   Patient developed YUNIEL on CKD, nephrology consulted. RENAL US was unremarkable with good urine output. YUNIEL improved throughout hospital course.   Hepatitis C +, viral load negative. follow up with outpatient GI doctor who provided previous treatment. During hospital stay, patient developed urinary retention, passed TOV on 12/25 started on flomax.  Patient was placed on CIWA monitoring for h/o alcohol dependence with symptom triggered ativan. Continued on thiamine, MVI, and folic acid. Physical therapy consulted, recommended rehab.    62 y/o M with hx of ETOH abuse, chronic pancreatitis, Hepatitis C, Gout, hx of gastric perforation in 2019, and hx of CKD with prior HD presents with nausea/vomiting admitted to medical for acute on chronic pancreatitis. CTAP showed fluid within thick-walled esophagus, chronic pancreatitis, severe dilatation of pancreatic duct, esophagitis. Patient was started on PPI and carafate, continued on creon. GI consulted. Esophagram on 12/27 showed possible distal obstruction, CT w/ oral contrast 12/28 /w possible mass in distal esophagus. EGD last week unrevealing of any obstruction. Discussed with Dr. Ceballos, no mass, its just thickening of the distal esophagus. Hospital course noted for anemia (THO) s/p 1u prbc continued on iron supplement without signs of bleeding/bruising.   Patient developed YUNIEL on CKD, nephrology consulted. RENAL US was unremarkable with good urine output. YUNIEL improved throughout hospital course.   Hepatitis C +, viral load negative. follow up with outpatient GI doctor who provided previous treatment. During hospital stay, patient developed urinary retention, passed TOV on 12/25 started on flomax.  Patient was placed on CIWA monitoring for h/o alcohol dependence with symptom triggered ativan. Continued on thiamine, MVI, and folic acid. Physical therapy consulted, recommended rehab. Patient refused and wanted to go home. Stable to go home and follow up with PCP and GI outpatient.

## 2021-12-29 NOTE — DISCHARGE NOTE PROVIDER - NSDCCPCAREPLAN_GEN_ALL_CORE_FT
PRINCIPAL DISCHARGE DIAGNOSIS  Diagnosis: Uremic encephalopathy  Assessment and Plan of Treatment: resolved      SECONDARY DISCHARGE DIAGNOSES  Diagnosis: Esophagitis  Assessment and Plan of Treatment: CT scan showing mass however on EGD, no mass detected. Repeat CT again showing mass.   CT chest PO contrast 12/28: Focal fullness involving the lower esophagus and the proximal stomach may be due to a mass given the constellation of findings although evaluation is limited due to lack of intravenous contrast and under distention of the lower esophagus and stomach. mid-lower wall esophageal wall thickening, chronic pancreatitis.

## 2021-12-29 NOTE — DISCHARGE NOTE PROVIDER - CARE PROVIDER_API CALL
Jose Ceballos   MEDICINE  [address] [place] [address]  Phone: [phone]  Fax: [phone]  Follow Up Time:     Austin Dukes  INTERNAL MEDICINE  [address] [facility], [address] [place] [id number]  Phone: [phone]  Fax: [phone]  Follow Up Time:     PCP,   Phone: (   )    -  Fax: (   )    -  Follow Up Time:

## 2021-12-29 NOTE — DISCHARGE NOTE PROVIDER - DETAILS OF MALNUTRITION DIAGNOSIS/DIAGNOSES
This patient has been assessed with a concern for Malnutrition and was treated during this hospitalization for the following Nutrition diagnosis/diagnoses:     -  12/22/2021: Severe protein-calorie malnutrition   -  12/22/2021: Underweight (BMI < 19)

## 2021-12-30 LAB
ANION GAP SERPL CALC-SCNC: 8 MMOL/L — SIGNIFICANT CHANGE UP (ref 5–17)
BUN SERPL-MCNC: 36 MG/DL — HIGH (ref 7–23)
CALCIUM SERPL-MCNC: 8.7 MG/DL — SIGNIFICANT CHANGE UP (ref 8.5–10.1)
CHLORIDE SERPL-SCNC: 113 MMOL/L — HIGH (ref 96–108)
CO2 SERPL-SCNC: 21 MMOL/L — LOW (ref 22–31)
CREAT SERPL-MCNC: 3.82 MG/DL — HIGH (ref 0.5–1.3)
GLUCOSE SERPL-MCNC: 104 MG/DL — HIGH (ref 70–99)
POTASSIUM SERPL-MCNC: 3.7 MMOL/L — SIGNIFICANT CHANGE UP (ref 3.5–5.3)
POTASSIUM SERPL-SCNC: 3.7 MMOL/L — SIGNIFICANT CHANGE UP (ref 3.5–5.3)
SODIUM SERPL-SCNC: 142 MMOL/L — SIGNIFICANT CHANGE UP (ref 135–145)

## 2021-12-30 PROCEDURE — 99232 SBSQ HOSP IP/OBS MODERATE 35: CPT

## 2021-12-30 RX ORDER — OXYCODONE AND ACETAMINOPHEN 5; 325 MG/1; MG/1
2 TABLET ORAL EVERY 4 HOURS
Refills: 0 | Status: DISCONTINUED | OUTPATIENT
Start: 2021-12-30 | End: 2021-12-31

## 2021-12-30 RX ADMIN — Medication 650 MILLIGRAM(S): at 00:49

## 2021-12-30 RX ADMIN — HEPARIN SODIUM 5000 UNIT(S): 5000 INJECTION INTRAVENOUS; SUBCUTANEOUS at 05:16

## 2021-12-30 RX ADMIN — HYDROMORPHONE HYDROCHLORIDE 1 MILLIGRAM(S): 2 INJECTION INTRAMUSCULAR; INTRAVENOUS; SUBCUTANEOUS at 06:31

## 2021-12-30 RX ADMIN — SENNA PLUS 2 TABLET(S): 8.6 TABLET ORAL at 22:05

## 2021-12-30 RX ADMIN — Medication 1 TABLET(S): at 12:24

## 2021-12-30 RX ADMIN — TAMSULOSIN HYDROCHLORIDE 0.4 MILLIGRAM(S): 0.4 CAPSULE ORAL at 22:05

## 2021-12-30 RX ADMIN — HYDROMORPHONE HYDROCHLORIDE 1 MILLIGRAM(S): 2 INJECTION INTRAMUSCULAR; INTRAVENOUS; SUBCUTANEOUS at 02:02

## 2021-12-30 RX ADMIN — GABAPENTIN 100 MILLIGRAM(S): 400 CAPSULE ORAL at 17:32

## 2021-12-30 RX ADMIN — Medication 25 MILLIGRAM(S): at 05:16

## 2021-12-30 RX ADMIN — PANTOPRAZOLE SODIUM 40 MILLIGRAM(S): 20 TABLET, DELAYED RELEASE ORAL at 05:16

## 2021-12-30 RX ADMIN — PREGABALIN 1000 MICROGRAM(S): 225 CAPSULE ORAL at 12:24

## 2021-12-30 RX ADMIN — Medication 1 MILLIGRAM(S): at 12:24

## 2021-12-30 RX ADMIN — Medication 25 MILLIGRAM(S): at 14:27

## 2021-12-30 RX ADMIN — GABAPENTIN 100 MILLIGRAM(S): 400 CAPSULE ORAL at 05:16

## 2021-12-30 RX ADMIN — HYDROMORPHONE HYDROCHLORIDE 1 MILLIGRAM(S): 2 INJECTION INTRAMUSCULAR; INTRAVENOUS; SUBCUTANEOUS at 01:47

## 2021-12-30 RX ADMIN — Medication 1 GRAM(S): at 17:32

## 2021-12-30 RX ADMIN — Medication 2 CAPSULE(S): at 08:14

## 2021-12-30 RX ADMIN — Medication 650 MILLIGRAM(S): at 05:16

## 2021-12-30 RX ADMIN — Medication 650 MILLIGRAM(S): at 17:32

## 2021-12-30 RX ADMIN — OXYCODONE AND ACETAMINOPHEN 2 TABLET(S): 5; 325 TABLET ORAL at 20:29

## 2021-12-30 RX ADMIN — HEPARIN SODIUM 5000 UNIT(S): 5000 INJECTION INTRAVENOUS; SUBCUTANEOUS at 17:32

## 2021-12-30 RX ADMIN — HYDROMORPHONE HYDROCHLORIDE 1 MILLIGRAM(S): 2 INJECTION INTRAMUSCULAR; INTRAVENOUS; SUBCUTANEOUS at 06:16

## 2021-12-30 RX ADMIN — PANTOPRAZOLE SODIUM 40 MILLIGRAM(S): 20 TABLET, DELAYED RELEASE ORAL at 17:32

## 2021-12-30 RX ADMIN — Medication 100 MILLIGRAM(S): at 12:24

## 2021-12-30 RX ADMIN — Medication 1 GRAM(S): at 00:49

## 2021-12-30 RX ADMIN — Medication 25 MILLIGRAM(S): at 22:05

## 2021-12-30 RX ADMIN — Medication 1 GRAM(S): at 12:24

## 2021-12-30 RX ADMIN — OXYCODONE AND ACETAMINOPHEN 2 TABLET(S): 5; 325 TABLET ORAL at 13:26

## 2021-12-30 RX ADMIN — Medication 1 GRAM(S): at 05:16

## 2021-12-30 RX ADMIN — OXYCODONE AND ACETAMINOPHEN 2 TABLET(S): 5; 325 TABLET ORAL at 22:05

## 2021-12-30 RX ADMIN — OXYCODONE AND ACETAMINOPHEN 2 TABLET(S): 5; 325 TABLET ORAL at 12:24

## 2021-12-30 RX ADMIN — Medication 2 CAPSULE(S): at 17:32

## 2021-12-30 RX ADMIN — Medication 2 CAPSULE(S): at 12:26

## 2021-12-30 RX ADMIN — Medication 650 MILLIGRAM(S): at 12:24

## 2021-12-30 NOTE — PROGRESS NOTE ADULT - SUBJECTIVE AND OBJECTIVE BOX
C/o N/V    Vital Signs Last 24 Hrs  T(C): 36.3 (12-30-21 @ 16:57), Max: 37.1 (12-30-21 @ 11:08)  T(F): 97.4 (12-30-21 @ 16:57), Max: 98.7 (12-30-21 @ 11:08)  HR: 86 (12-30-21 @ 16:57) (78 - 89)  BP: 153/78 (12-30-21 @ 16:57) (141/71 - 167/89)  RR: 19 (12-30-21 @ 16:57) (18 - 19)  SpO2: 99% (12-30-21 @ 16:57) (99% - 100%)    s1s2  b/l air entry  soft. ND  no edema                           30 Dec 2021 08:14    142    |  113    |  36     ----------------------------<  104    3.7     |  21     |  3.82     Ca    8.7        30 Dec 2021 08:14    chlorproMAZINE    Tablet 25 milliGRAM(s) Oral every 8 hours  cyanocobalamin 1000 MICROGram(s) Oral daily  epoetin mejia-epbx (RETACRIT) Injectable 83517 Unit(s) SubCutaneous every 7 days  folic acid 1 milliGRAM(s) Oral daily  gabapentin 100 milliGRAM(s) Oral every 12 hours  heparin   Injectable 5000 Unit(s) SubCutaneous every 12 hours  iohexol 300 mG (iodine)/mL Oral Solution 30 milliLiter(s) Oral once  LORazepam   Injectable 2 milliGRAM(s) IV Push every 2 hours PRN  magnesium hydroxide Suspension 30 milliLiter(s) Oral daily PRN  multivitamin 1 Tablet(s) Oral daily  oxycodone    5 mG/acetaminophen 325 mG 2 Tablet(s) Oral every 4 hours PRN  pancrelipase  (CREON  6,000 Lipase Units) 2 Capsule(s) Oral three times a day with meals  pantoprazole    Tablet 40 milliGRAM(s) Oral two times a day  senna 2 Tablet(s) Oral at bedtime  sodium bicarbonate 650 milliGRAM(s) Oral every 6 hours  sucralfate 1 Gram(s) Oral four times a day  tamsulosin 0.4 milliGRAM(s) Oral at bedtime  thiamine 100 milliGRAM(s) Oral daily    Assessment/Plan:    S/p YUNIEL, hx HD dependent in the past  Recovery with CKD 4 now s/p repeat YUNIEL   Cr is improving  Dose meds for CrCl < 20  Diffuse esophagitis  Chronic pancreatitis  GI f/u  Off IVF  Avoid nephrotoxins  F/u [facility]    [phone]

## 2021-12-30 NOTE — PROGRESS NOTE ADULT - SUBJECTIVE AND OBJECTIVE BOX
Patient is a 61y old  Male who presents with a chief complaint of abd pain (30 Dec 2021 11:12)      HPI:    61 yr  m,    h/o gout,  ex ETOH abuse, chronic pancreatitis, anemia, , CKD  takes  no meds  ep C s/p treatment, prior gastric perforation with peritonitis    resenting with lower   and  epigastic  pain . worsening  for 1 year.     patient  is  a poor historian,   denies any recent etoh useept known to renal (20 Dec 2021 18:23)      INTERVAL HPI/OVERNIGHT EVENTS:  Patient still spitting and ? vomiting/retching/ coughing up the  food. Patient is happy with how he is doing and states that he is doing better now that how he has been doing in the past. The generalized pain is also much better. Lunch was totally consumed.     MEDICATIONS  (STANDING):  chlorproMAZINE    Tablet 25 milliGRAM(s) Oral every 8 hours  cyanocobalamin 1000 MICROGram(s) Oral daily  epoetin mejia-epbx (RETACRIT) Injectable 68007 Unit(s) SubCutaneous every 7 days  folic acid 1 milliGRAM(s) Oral daily  gabapentin 100 milliGRAM(s) Oral every 12 hours  heparin   Injectable 5000 Unit(s) SubCutaneous every 12 hours  iohexol 300 mG (iodine)/mL Oral Solution 30 milliLiter(s) Oral once  multivitamin 1 Tablet(s) Oral daily  pancrelipase  (CREON  6,000 Lipase Units) 2 Capsule(s) Oral three times a day with meals  pantoprazole    Tablet 40 milliGRAM(s) Oral two times a day  senna 2 Tablet(s) Oral at bedtime  sodium bicarbonate 650 milliGRAM(s) Oral every 6 hours  sucralfate 1 Gram(s) Oral four times a day  tamsulosin 0.4 milliGRAM(s) Oral at bedtime  thiamine 100 milliGRAM(s) Oral daily    MEDICATIONS  (PRN):  LORazepam   Injectable 2 milliGRAM(s) IV Push every 2 hours PRN CIWA-Ar score 8 or greater  magnesium hydroxide Suspension 30 milliLiter(s) Oral daily PRN Constipation  oxycodone    5 mG/acetaminophen 325 mG 2 Tablet(s) Oral every 4 hours PRN Severe Pain (7 - 10)      FAMILY HISTORY:  No pertinent family history in first degree relatives        Allergies    No Known Allergies    Intolerances        PMH/PSH:  ETOH abuse    Pancreatitis    Hepatitis C    Gout    No significant past surgical history          REVIEW OF SYSTEMS:  CONSTITUTIONAL: No fever, weight loss,  EYES: No eye pain, visual disturbances, or discharge  ENMT:  No difficulty hearing, tinnitus, vertigo; No sinus or throat pain  NECK: No pain or stiffness  BREASTS: No pain, masses, or nipple discharge  RESPIRATORY: No cough, wheezing, chills or hemoptysis; No shortness of breath  CARDIOVASCULAR: No chest pain, palpitations, dizziness, or leg swelling  GASTROINTESTINAL: See above   GENITOURINARY: No dysuria, frequency, hematuria, or incontinence  NEUROLOGICAL: No headaches, memory loss, loss of strength, numbness, or tremors  SKIN: No itching, burning, rashes, or lesions   LYMPH NODES: No enlarged glands  ENDOCRINE: No heat or cold intolerance; No hair loss  MUSCULOSKELETAL: No joint pain or swelling; No muscle, back, or extremity pain  PSYCHIATRIC: No depression, anxiety, mood swings, or difficulty sleeping  HEME/LYMPH: No easy bruising, or bleeding gums  ALLERGY AND IMMUNOLOGIC: No hives or eczema    Vital Signs Last 24 Hrs  T(C): 37.1 (30 Dec 2021 11:08), Max: 37.1 (30 Dec 2021 11:08)  T(F): 98.7 (30 Dec 2021 11:08), Max: 98.7 (30 Dec 2021 11:08)  HR: 85 (30 Dec 2021 11:08) (77 - 89)  BP: 143/81 (30 Dec 2021 11:08) (128/72 - 167/89)  BP(mean): --  RR: 19 (30 Dec 2021 11:08) (18 - 19)  SpO2: 100% (30 Dec 2021 11:08) (100% - 100%)    PHYSICAL EXAM:  GENERAL: NAD, well-groomed, well-developed  HEAD:  Atraumatic, Normocephalic  EYES: EOMI, PERRLA, conjunctiva and sclera clear  NECK: Supple, No JVD, Normal thyroid  NERVOUS SYSTEM:  Alert & Oriented X3, Good concentration; Motor Strength 5/5 B/L upper and lower extremities;   CHEST/LUNG: Clear to percussion bilaterally; No rales, rhonchi, wheezing, or rubs  HEART: Regular rate and rhythm; No murmurs, rubs, or gallops  ABDOMEN: Soft, Nontender, Nondistended; Bowel sounds present  EXTREMITIES:  2+ Peripheral Pulses, No clubbing, cyanosis, or edema  LYMPH: No lymphadenopathy noted  SKIN: No rashes or lesions    LAB        CBC:  12-27 @ 08:16  WBC 8.44   Hgb 8.7   Hct 29.7   Plts 404  MCV 77.7  12-25 @ 08:54  WBC 8.10   Hgb 7.9   Hct 26.5   Plts 403  MCV 76.4  12-24 @ 08:30  WBC 6.61   Hgb 7.5   Hct 25.4   Plts 350  MCV 76.5      Chemistry:  12-30 @ 08:14  Na+ 142  K+ 3.7  Cl- 113  CO2 21  BUN 36  Cr 3.82     12-29 @ 07:34  Na+ 138  K+ 3.8  Cl- 110  CO2 21  BUN 37  Cr 3.93     12-27 @ 08:16  Na+ 141  K+ 3.7  Cl- 114  CO2 19  BUN 38  Cr 3.67     12-26 @ 09:02  Na+ 140  K+ 3.5  Cl- 115  CO2 18  BUN 41  Cr 4.03     12-25 @ 08:54  Na+ 139  K+ 3.4  Cl- 112  CO2 20  BUN 46  Cr 4.10     12-24 @ 08:30  Na+ 136  K+ 3.7  Cl- 109  CO2 20  BUN 51  Cr 4.30         Glucose, Serum: 104 mg/dL (12-30 @ 08:14)  Glucose, Serum: 157 mg/dL (12-29 @ 07:34)  Glucose, Serum: 96 mg/dL (12-27 @ 08:16)  Glucose, Serum: 83 mg/dL (12-26 @ 09:02)  Glucose, Serum: 94 mg/dL (12-25 @ 08:54)  Glucose, Serum: 89 mg/dL (12-24 @ 08:30)      30 Dec 2021 08:14    142    |  113    |  36     ----------------------------<  104    3.7     |  21     |  3.82   29 Dec 2021 07:34    138    |  110    |  37     ----------------------------<  157    3.8     |  21     |  3.93   27 Dec 2021 08:16    141    |  114    |  38     ----------------------------<  96     3.7     |  19     |  3.67   26 Dec 2021 09:02    140    |  115    |  41     ----------------------------<  83     3.5     |  18     |  4.03   25 Dec 2021 08:54    139    |  112    |  46     ----------------------------<  94     3.4     |  20     |  4.10   24 Dec 2021 08:30    136    |  109    |  51     ----------------------------<  89     3.7     |  20     |  4.30     Ca    8.7        30 Dec 2021 08:14  Ca    8.2        29 Dec 2021 07:34  Ca    8.4        27 Dec 2021 08:16  Ca    8.7        26 Dec 2021 09:02  Ca    8.5        25 Dec 2021 08:54  Ca    8.0        24 Dec 2021 08:30  Phos  2.9       27 Dec 2021 08:16  Phos  3.2       24 Dec 2021 08:30  Mg     2.2       27 Dec 2021 08:16  Mg     2.4       24 Dec 2021 08:30    TPro  7.0    /  Alb  2.5    /  TBili  0.2    /  DBili  0.1    /  AST  14     /  ALT  9      /  AlkPhos  99     27 Dec 2021 08:16              CAPILLARY BLOOD GLUCOSE              RADIOLOGY & ADDITIONAL TESTS:    Imaging Personally Reviewed:  [ ] YES  [ ] NO    Consultant(s) Notes Reviewed:  [ ] YES  [ ] NO    Care Discussed with Consultants/Other Providers [ ] YES  [ ] NO

## 2021-12-30 NOTE — PROGRESS NOTE ADULT - ASSESSMENT
62 y/o M with hx of ETOH abuse, chronic pancreatitis, Hepatitis C, Gout, hx of gastric perforation in 2019, and hx of CKD with prior HD presents with nausea/vomiting admitted to medical for acute on chronic pancreatitis.    Assessment and Plan:     Abdominal pain 2/2 Diffuse esophagitis and chronic pancreatitis  12/23 EGD: hiatal hernia, gastritis   CTAP : FLUID WITHIN A THICK-WALLED ESOPHAGUS. ASSOCIATED THICKENING OF THE   ARYEPIGLOTTIC FOLDS AND FALSE CORDS. moderate pancreatic atrophy with extensive   dystrophic calcification throughout, compatible with chronic   pancreatitis. There is severe dilatation of the pancreatic duct within   the body and tail secondary to creations in the pancreatic duct. There is   no peripancreatic stranding or fluid collection.  moderate hiatal hernia. There is moderate concentric   low-attenuation wall thickening of the esophagus with prominent mucosal   enhancement, suggestive of use esophagitis.  blood Cx --NGTD    PRN pain regimen  GI following   PPI , carafate   antiemetic   creon  Esophagram 12/27 w/ possible distal obstruction, CT w/ oral contrast 12/28 /w possible mass in distal esophagus. EGD last week unrevealing of any obstruction. Discussed with Dr. Ceballos, no evidence of mass, its just thickening of the esophageal wall  Currently on easy to chew diet, spitting and ?vomiting patient reports long standing symptom, could be attributed to dysmotility.     HypoKalemia --repleted     Anemia, THO   s/p 1u PRBC   no e/o bleeding or bruising   FOBT   venofer   iron supplement   H/H stable     YUNIEL HD dependent in the past; recovery with CKD 4 now with repeat YUNIEL - slowly better  Right kidney atropic, small left renal cyst  Nephro following   Adjust all meds as per CrCl  renal US: unremarkable   good urine output     Hepatitis C  Viral load  Not on meds currently  unknown treatment course, will need outpatient follow-up     BPH  passed TOV 12/25   flomax     ETOH dependence , not in w/d   CIWA monitoring with symptom trigger ativan prn for CIWA >8   thiamine, MVI, folic acid     Preventative measures   SCDs-dvt ppx  fall precautions     PT :[facility]EN, now patient is refusing CARMEN. Likely Home care.

## 2021-12-30 NOTE — PROGRESS NOTE ADULT - SUBJECTIVE AND OBJECTIVE BOX
Patient is a 61y old  Male who presents with a chief complaint of abd pain (21 Dec 2021 10:06)    INTERVAL HPI/OVERNIGHT EVENTS: Patients seen and examined at bedside this morning. No acute events overnight.   abd pain improved but still w/ spitting and ?vomiting      Denies fever, chills, dizziness, HA, cough, CP, palpitations, SOB, dysuria.     MEDICATIONS  (STANDING):  heparin   Injectable 5000 Unit(s) SubCutaneous every 12 hours  pantoprazole  Injectable 40 milliGRAM(s) IV Push daily  potassium chloride  20 mEq/100 mL IVPB 20 milliEquivalent(s) IV Intermittent every 2 hours  sodium chloride 0.9%. 1000 milliLiter(s) (100 mL/Hr) IV Continuous <Continuous>    MEDICATIONS  (PRN):  HYDROmorphone  Injectable 0.5 milliGRAM(s) IV Push every 12 hours PRN Moderate Pain (4 - 6)  ondansetron Injectable 4 milliGRAM(s) IV Push every 6 hours PRN Vomiting    Allergies    No Known Allergies    Intolerances      Vital Signs Last 24 Hrs  T(C): 36.8 (29 Dec 2021 10:19), Max: 36.8 (29 Dec 2021 10:19)  T(F): 98.2 (29 Dec 2021 10:19), Max: 98.2 (29 Dec 2021 10:19)  HR: 91 (29 Dec 2021 10:19) (74 - 96)  BP: 133/87 (29 Dec 2021 10:19) (121/68 - 145/81)  BP(mean): --  RR: 18 (29 Dec 2021 10:19) (18 - 18)  SpO2: 99% (29 Dec 2021 10:19) (96% - 99%)      CAPILLARY BLOOD GLUCOSE        PHYSICAL EXAM:  GENERAL: NAD, no increased WOB   HEAD:  Atraumatic, Normocephalic  EYES: EOMI, PERRLA, conjunctiva and sclera clear  ENMT: No tonsillar erythema, exudates, or enlargement;   NECK: Supple, No JVD,  NERVOUS SYSTEM:  Alert & Oriented X3, Good concentration; nonfocal   CHEST/LUNG: CTAB  HEART: Regular rate and rhythm; No murmurs, rubs, or gallops  ABDOMEN: Soft, Nontender, Nondistended; Bowel sounds present; midline old well heeled scar   EXTREMITIES:  2+ Peripheral Pulses b/l, No clubbing, cyanosis, calf tenderness or edema b/l     Labs                                     7.9    8.10  )-----------( 403      ( 25 Dec 2021 08:54 )             26.5   12-    140  |  115<H>  |  41<H>  ----------------------------<  83  3.5   |  18<L>  |  4.03<H>    Ca    8.7      26 Dec 2021 09:02                    Urinalysis Basic - ( 20 Dec 2021 19:52 )    Color: Yellow / Appearance: Clear / S.010 / pH: x  Gluc: x / Ketone: Negative  / Bili: Negative / Urobili: Negative mg/dL   Blood: x / Protein: 30 mg/dL / Nitrite: Negative   Leuk Esterase: Negative / RBC: x / WBC 0-2   Sq Epi: x / Non Sq Epi: Occasional / Bacteria: Few    Consultant(s) Notes Reviewed [ x] Yes     Care Discussed with [x ] Consultants  [x ] Patient  [x ] Family--mother Dede [phone]  [x ] /   [x ] Other; RN  Brother josecristino [phone]

## 2021-12-30 NOTE — PROGRESS NOTE ADULT - SUBJECTIVE AND OBJECTIVE BOX
[facility] NEPHROLOGY SERVICES, [facility]  NEPHROLOGY AND HYPERTENSION  [address]  [address]  [place] [address]  [phone]    MD HALIE OLIVIA MD ANDREY GONCHARUK, MD MADHU KORRAPATI, MD YELENA ROSENBERG, MD BINNY KOSHY, MD CHRISTOPHER CAPUTO, MD EDWARD BOVER, MD      Information from chart:  "Patient is a 61y old  Male who presents with a chief complaint of abd pain (29 Dec 2021 14:52)    HPI:    61 yr  m,    h/o gout,  ex ETOH abuse, chronic pancreatitis, anemia, , CKD  takes  no meds  ep C s/p treatment, prior gastric perforation with peritonitis    resenting with lower   and  epigastic  pain . worsening  for 1 year.     patient  is  a poor historian,   denies any recent etoh useept known to renal (20 Dec 2021 18:23)   "    PAST MEDICAL & SURGICAL HISTORY:  ETOH abuse    Pancreatitis    Hepatitis C  treated    Gout    No significant past surgical history      FAMILY HISTORY:  No pertinent family history in first degree relatives      Allergies    No Known Allergies    Intolerances      Home Medications:  amLODIPine 10 mg oral tablet: 1 tab(s) orally once a day (20 Dec 2021 19:24)  calcium acetate 667 mg oral tablet: 1 tab(s) orally 3 times a day (20 Dec 2021 19:24)    MEDICATIONS  (STANDING):  chlorproMAZINE    Tablet 25 milliGRAM(s) Oral every 8 hours  cyanocobalamin 1000 MICROGram(s) Oral daily  epoetin mejia-epbx (RETACRIT) Injectable 07564 Unit(s) SubCutaneous every 7 days  ferrous    sulfate 325 milliGRAM(s) Oral daily  folic acid 1 milliGRAM(s) Oral daily  gabapentin 100 milliGRAM(s) Oral every 12 hours  heparin   Injectable 5000 Unit(s) SubCutaneous every 12 hours  iohexol 300 mG (iodine)/mL Oral Solution 30 milliLiter(s) Oral once  lactated ringers. 1000 milliLiter(s) (100 mL/Hr) IV Continuous <Continuous>  multivitamin 1 Tablet(s) Oral daily  pancrelipase  (CREON  6,000 Lipase Units) 2 Capsule(s) Oral three times a day with meals  pantoprazole    Tablet 40 milliGRAM(s) Oral two times a day  senna 2 Tablet(s) Oral at bedtime  sodium bicarbonate 650 milliGRAM(s) Oral every 6 hours  sucralfate 1 Gram(s) Oral four times a day  tamsulosin 0.4 milliGRAM(s) Oral at bedtime  thiamine 100 milliGRAM(s) Oral daily    MEDICATIONS  (PRN):  acetaminophen     Tablet .. 650 milliGRAM(s) Oral every 6 hours PRN Temp greater or equal to 38C (100.4F), Mild Pain (1 - 3)  HYDROmorphone  Injectable 1 milliGRAM(s) IV Push every 4 hours PRN Severe Pain (7 - 10)  LORazepam   Injectable 2 milliGRAM(s) IV Push every 2 hours PRN CIWA-Ar score 8 or greater  magnesium hydroxide Suspension 30 milliLiter(s) Oral daily PRN Constipation    Vital Signs Last 24 Hrs  T(C): 36.8 (30 Dec 2021 00:08), Max: 36.8 (29 Dec 2021 10:19)  T(F): 98.2 (30 Dec 2021 00:08), Max: 98.2 (29 Dec 2021 10:19)  HR: 78 (30 Dec 2021 00:08) (77 - 91)  BP: 141/71 (30 Dec 2021 00:08) (121/68 - 141/71)  BP(mean): --  RR: 18 (30 Dec 2021 00:08) (18 - 18)  SpO2: 100% (30 Dec 2021 00:08) (98% - 100%)    Daily     Daily     12-28-21 @ 07:01  -  12-29-21 @ 07:00  --------------------------------------------------------  IN: 1340 mL / OUT: 400 mL / NET: 940 mL      CAPILLARY BLOOD GLUCOSE        PHYSICAL EXAM:      T(C): 36.8 (12-30-21 @ 00:08), Max: 36.8 (12-29-21 @ 10:19)  HR: 78 (12-30-21 @ 00:08) (77 - 91)  BP: 141/71 (12-30-21 @ 00:08) (121/68 - 141/71)  RR: 18 (12-30-21 @ 00:08) (18 - 18)  SpO2: 100% (12-30-21 @ 00:08) (98% - 100%)  Wt(kg): --  Lungs clear  Heart S1S2  Abd soft NT ND  Extremities:   tr edema              12-29    138  |  110<H>  |  37<H>  ----------------------------<  157<H>  3.8   |  21<L>  |  3.93<H>    Ca    8.2<L>      29 Dec 2021 07:34        Creatinine Trend: 3.93<--, 3.67<--, 4.03<--, 4.10<--, 4.30<--, 4.75<--          Assessment   YUNIEL CKD 3-4;   Esophageal dysmotility suspected;   Renal indices near baseline     Plan  Continue IVF  GI follow up noted   Will follow    Austin Dukes MD

## 2021-12-31 ENCOUNTER — TRANSCRIPTION ENCOUNTER (OUTPATIENT)
Age: 61
End: 2021-12-31

## 2021-12-31 VITALS
SYSTOLIC BLOOD PRESSURE: 122 MMHG | OXYGEN SATURATION: 99 % | HEART RATE: 79 BPM | TEMPERATURE: 98 F | DIASTOLIC BLOOD PRESSURE: 79 MMHG | RESPIRATION RATE: 20 BRPM

## 2021-12-31 LAB
ANION GAP SERPL CALC-SCNC: 6 MMOL/L — SIGNIFICANT CHANGE UP (ref 5–17)
BUN SERPL-MCNC: 35 MG/DL — HIGH (ref 7–23)
CALCIUM SERPL-MCNC: 8.6 MG/DL — SIGNIFICANT CHANGE UP (ref 8.5–10.1)
CHLORIDE SERPL-SCNC: 112 MMOL/L — HIGH (ref 96–108)
CO2 SERPL-SCNC: 22 MMOL/L — SIGNIFICANT CHANGE UP (ref 22–31)
CREAT SERPL-MCNC: 3.9 MG/DL — HIGH (ref 0.5–1.3)
GLUCOSE SERPL-MCNC: 86 MG/DL — SIGNIFICANT CHANGE UP (ref 70–99)
POTASSIUM SERPL-MCNC: 4.4 MMOL/L — SIGNIFICANT CHANGE UP (ref 3.5–5.3)
POTASSIUM SERPL-SCNC: 4.4 MMOL/L — SIGNIFICANT CHANGE UP (ref 3.5–5.3)
SODIUM SERPL-SCNC: 140 MMOL/L — SIGNIFICANT CHANGE UP (ref 135–145)

## 2021-12-31 PROCEDURE — 99239 HOSP IP/OBS DSCHRG MGMT >30: CPT

## 2021-12-31 RX ORDER — TAMSULOSIN HYDROCHLORIDE 0.4 MG/1
1 CAPSULE ORAL
Qty: 30 | Refills: 0
Start: 2021-12-31 | End: 2022-01-29

## 2021-12-31 RX ORDER — LIPASE/PROTEASE/AMYLASE 16-48-48K
2 CAPSULE,DELAYED RELEASE (ENTERIC COATED) ORAL
Qty: 180 | Refills: 0
Start: 2021-12-31 | End: 2022-01-29

## 2021-12-31 RX ORDER — FOLIC ACID 0.8 MG
1 TABLET ORAL
Qty: 30 | Refills: 0
Start: 2021-12-31 | End: 2022-01-29

## 2021-12-31 RX ORDER — PANTOPRAZOLE SODIUM 20 MG/1
1 TABLET, DELAYED RELEASE ORAL
Qty: 60 | Refills: 0
Start: 2021-12-31 | End: 2022-01-29

## 2021-12-31 RX ORDER — SODIUM BICARBONATE 1 MEQ/ML
1 SYRINGE (ML) INTRAVENOUS
Qty: 120 | Refills: 0
Start: 2021-12-31 | End: 2022-01-29

## 2021-12-31 RX ORDER — SUCRALFATE 1 G
1 TABLET ORAL
Qty: 120 | Refills: 0
Start: 2021-12-31 | End: 2022-01-29

## 2021-12-31 RX ORDER — SENNA PLUS 8.6 MG/1
2 TABLET ORAL
Qty: 60 | Refills: 0
Start: 2021-12-31 | End: 2022-01-29

## 2021-12-31 RX ORDER — GABAPENTIN 400 MG/1
1 CAPSULE ORAL
Qty: 60 | Refills: 0
Start: 2021-12-31 | End: 2022-01-29

## 2021-12-31 RX ORDER — PREGABALIN 225 MG/1
1 CAPSULE ORAL
Qty: 30 | Refills: 0
Start: 2021-12-31 | End: 2022-01-29

## 2021-12-31 RX ORDER — THIAMINE MONONITRATE (VIT B1) 100 MG
1 TABLET ORAL
Qty: 30 | Refills: 0
Start: 2021-12-31 | End: 2022-01-29

## 2021-12-31 RX ADMIN — OXYCODONE AND ACETAMINOPHEN 2 TABLET(S): 5; 325 TABLET ORAL at 05:42

## 2021-12-31 RX ADMIN — Medication 650 MILLIGRAM(S): at 05:43

## 2021-12-31 RX ADMIN — OXYCODONE AND ACETAMINOPHEN 2 TABLET(S): 5; 325 TABLET ORAL at 09:51

## 2021-12-31 RX ADMIN — Medication 100 MILLIGRAM(S): at 13:00

## 2021-12-31 RX ADMIN — OXYCODONE AND ACETAMINOPHEN 2 TABLET(S): 5; 325 TABLET ORAL at 00:56

## 2021-12-31 RX ADMIN — Medication 650 MILLIGRAM(S): at 00:56

## 2021-12-31 RX ADMIN — OXYCODONE AND ACETAMINOPHEN 2 TABLET(S): 5; 325 TABLET ORAL at 07:08

## 2021-12-31 RX ADMIN — Medication 2 CAPSULE(S): at 12:30

## 2021-12-31 RX ADMIN — OXYCODONE AND ACETAMINOPHEN 2 TABLET(S): 5; 325 TABLET ORAL at 10:51

## 2021-12-31 RX ADMIN — Medication 25 MILLIGRAM(S): at 05:43

## 2021-12-31 RX ADMIN — HEPARIN SODIUM 5000 UNIT(S): 5000 INJECTION INTRAVENOUS; SUBCUTANEOUS at 05:42

## 2021-12-31 RX ADMIN — Medication 1 GRAM(S): at 05:44

## 2021-12-31 RX ADMIN — OXYCODONE AND ACETAMINOPHEN 2 TABLET(S): 5; 325 TABLET ORAL at 02:34

## 2021-12-31 RX ADMIN — GABAPENTIN 100 MILLIGRAM(S): 400 CAPSULE ORAL at 05:43

## 2021-12-31 RX ADMIN — Medication 1 GRAM(S): at 00:56

## 2021-12-31 RX ADMIN — Medication 1 GRAM(S): at 13:00

## 2021-12-31 RX ADMIN — Medication 650 MILLIGRAM(S): at 13:00

## 2021-12-31 RX ADMIN — Medication 1 MILLIGRAM(S): at 13:00

## 2021-12-31 RX ADMIN — Medication 2 CAPSULE(S): at 08:10

## 2021-12-31 RX ADMIN — Medication 1 TABLET(S): at 13:00

## 2021-12-31 RX ADMIN — PANTOPRAZOLE SODIUM 40 MILLIGRAM(S): 20 TABLET, DELAYED RELEASE ORAL at 05:43

## 2021-12-31 RX ADMIN — PREGABALIN 1000 MICROGRAM(S): 225 CAPSULE ORAL at 13:00

## 2021-12-31 NOTE — PROGRESS NOTE ADULT - TIME BILLING
min spent reviewing chart, examining patient, discussing plan with patient and family
min spent reviewing chart, examining patient, discussing plan with patient and family
GI
GI
min spent reviewing chart, examining patient, discussing plan with patient and family
GI
GI
min spent reviewing chart, examining patient, discussing plan with patient and family
GI
GI
min spent reviewing chart, examining patient, discussing plan with patient and family
min spent reviewing chart, examining patient, discussing plan with patient and family
GI
min spent reviewing chart, examining patient, discussing plan with patient and family

## 2021-12-31 NOTE — DISCHARGE NOTE NURSING/CASE MANAGEMENT/SOCIAL WORK - NSDCPEFALRISK_GEN_ALL_CORE
For information on Fall & Injury Prevention, visit: [email or web address] OR  [email or web address] OR  https://www.cdc.gov/steadi/patient.html

## 2021-12-31 NOTE — PROGRESS NOTE ADULT - REASON FOR ADMISSION
abd pain

## 2021-12-31 NOTE — PROGRESS NOTE ADULT - NUTRITIONAL ASSESSMENT
This patient has been assessed with a concern for Malnutrition and has been determined to have a diagnosis/diagnoses of Severe protein-calorie malnutrition and Underweight (BMI < 19).    This patient is being managed with:   Diet Clear Liquid-  For patients receiving Renal Replacement - No Protein Restr No Conc K No Conc Phos Low Sodium (RENAL)  Entered: Dec 23 2021  4:46PM    
This patient has been assessed with a concern for Malnutrition and has been determined to have a diagnosis/diagnoses of Severe protein-calorie malnutrition and Underweight (BMI < 19).    This patient is being managed with:   Diet Easy to Chew-  For patients receiving Renal Replacement - No Protein Restr No Conc K No Conc Phos Low Sodium (RENAL)  Entered: Dec 28 2021  4:28PM    
This patient has been assessed with a concern for Malnutrition and has been determined to have a diagnosis/diagnoses of Severe protein-calorie malnutrition and Underweight (BMI < 19).    This patient is being managed with:   Diet NPO after Midnight-     NPO Start Date: 26-Dec-2021   NPO Start Time: 23:59  Except Medications  With Ice Chips/Sips of Water  Entered: Dec 26 2021 12:15PM    Diet Easy to Chew-  For patients receiving Renal Replacement - No Protein Restr No Conc K No Conc Phos Low Sodium (RENAL)  Entered: Dec 26 2021 12:14PM    
This patient has been assessed with a concern for Malnutrition and has been determined to have a diagnosis/diagnoses of Severe protein-calorie malnutrition and Underweight (BMI < 19).    This patient is being managed with:   Diet Easy to Chew-  For patients receiving Renal Replacement - No Protein Restr No Conc K No Conc Phos Low Sodium (RENAL)  Entered: Dec 28 2021  4:28PM    
This patient has been assessed with a concern for Malnutrition and has been determined to have a diagnosis/diagnoses of Severe protein-calorie malnutrition and Underweight (BMI < 19).    This patient is being managed with:   Diet Clear Liquid-  For patients receiving Renal Replacement - No Protein Restr No Conc K No Conc Phos Low Sodium (RENAL)  Entered: Dec 23 2021  4:46PM    
This patient has been assessed with a concern for Malnutrition and has been determined to have a diagnosis/diagnoses of Severe protein-calorie malnutrition and Underweight (BMI < 19).    This patient is being managed with:   Diet Renal Restrictions-  For patients receiving Renal Replacement - No Protein Restr No Conc K No Conc Phos Low Sodium  Entered: Dec 24 2021 11:48AM    
This patient has been assessed with a concern for Malnutrition and has been determined to have a diagnosis/diagnoses of Severe protein-calorie malnutrition and Underweight (BMI < 19).    This patient is being managed with:   Diet Clear Liquid-  For patients receiving Renal Replacement - No Protein Restr No Conc K No Conc Phos Low Sodium (RENAL)  Entered: Dec 21 2021  4:00PM    
This patient has been assessed with a concern for Malnutrition and has been determined to have a diagnosis/diagnoses of Severe protein-calorie malnutrition and Underweight (BMI < 19).    This patient is being managed with:   Diet NPO after Midnight-     NPO Start Date: 26-Dec-2021   NPO Start Time: 23:59  Except Medications  With Ice Chips/Sips of Water  Entered: Dec 26 2021 12:15PM    
This patient has been assessed with a concern for Malnutrition and has been determined to have a diagnosis/diagnoses of Severe protein-calorie malnutrition and Underweight (BMI < 19).    This patient is being managed with:   Diet NPO after Midnight-     NPO Start Date: 26-Dec-2021   NPO Start Time: 23:59  Except Medications  With Ice Chips/Sips of Water  Entered: Dec 26 2021 12:15PM    Diet Easy to Chew-  For patients receiving Renal Replacement - No Protein Restr No Conc K No Conc Phos Low Sodium (RENAL)  Entered: Dec 26 2021 12:14PM    
This patient has been assessed with a concern for Malnutrition and has been determined to have a diagnosis/diagnoses of Severe protein-calorie malnutrition and Underweight (BMI < 19).    This patient is being managed with:   Diet Easy to Chew-  For patients receiving Renal Replacement - No Protein Restr No Conc K No Conc Phos Low Sodium (RENAL)  Entered: Dec 28 2021  4:28PM

## 2021-12-31 NOTE — PROGRESS NOTE ADULT - PROVIDER SPECIALTY LIST ADULT
Nephrology
Gastroenterology
Hospitalist
Hospitalist
Nephrology
Gastroenterology
Gastroenterology
Hospitalist
Hospitalist
Nephrology
Gastroenterology
Gastroenterology
Hospitalist
Hospitalist
Gastroenterology
Gastroenterology
Hospitalist

## 2021-12-31 NOTE — DISCHARGE NOTE NURSING/CASE MANAGEMENT/SOCIAL WORK - PATIENT PORTAL LINK FT
You can access the FollowMyHealth Patient Portal offered by [facility] by registering at the following website: [email or web address]. By joining [employer]’s FollowMyHealth portal, you will also be able to view your health information using other applications (apps) compatible with our system.

## 2021-12-31 NOTE — PROGRESS NOTE ADULT - SUBJECTIVE AND OBJECTIVE BOX
Patient is a 61y old  Male who presents with a chief complaint of abd pain (21 Dec 2021 10:06)    INTERVAL HPI/OVERNIGHT EVENTS: Patients seen and examined at bedside this morning. No acute events overnight.   abd pain improved but still w/ spitting and ?vomiting      Denies fever, chills, dizziness, HA, cough, CP, palpitations, SOB, dysuria.     MEDICATIONS  (STANDING):  heparin   Injectable 5000 Unit(s) SubCutaneous every 12 hours  pantoprazole  Injectable 40 milliGRAM(s) IV Push daily  potassium chloride  20 mEq/100 mL IVPB 20 milliEquivalent(s) IV Intermittent every 2 hours  sodium chloride 0.9%. 1000 milliLiter(s) (100 mL/Hr) IV Continuous <Continuous>    MEDICATIONS  (PRN):  HYDROmorphone  Injectable 0.5 milliGRAM(s) IV Push every 12 hours PRN Moderate Pain (4 - 6)  ondansetron Injectable 4 milliGRAM(s) IV Push every 6 hours PRN Vomiting    Allergies    No Known Allergies    Intolerances      Vital Signs Last 24 Hrs  T(C): 36.8 (29 Dec 2021 10:19), Max: 36.8 (29 Dec 2021 10:19)  T(F): 98.2 (29 Dec 2021 10:19), Max: 98.2 (29 Dec 2021 10:19)  HR: 91 (29 Dec 2021 10:19) (74 - 96)  BP: 133/87 (29 Dec 2021 10:19) (121/68 - 145/81)  BP(mean): --  RR: 18 (29 Dec 2021 10:19) (18 - 18)  SpO2: 99% (29 Dec 2021 10:19) (96% - 99%)      CAPILLARY BLOOD GLUCOSE        PHYSICAL EXAM:  GENERAL: NAD, no increased WOB   HEAD:  Atraumatic, Normocephalic  EYES: EOMI, PERRLA, conjunctiva and sclera clear  ENMT: No tonsillar erythema, exudates, or enlargement;   NECK: Supple, No JVD,  NERVOUS SYSTEM:  Alert & Oriented X3, Good concentration; nonfocal   CHEST/LUNG: CTAB  HEART: Regular rate and rhythm; No murmurs, rubs, or gallops  ABDOMEN: Soft, Nontender, Nondistended; Bowel sounds present; midline old well heeled scar   EXTREMITIES:  2+ Peripheral Pulses b/l, No clubbing, cyanosis, calf tenderness or edema b/l     Labs                                     7.9    8.10  )-----------( 403      ( 25 Dec 2021 08:54 )             26.5   12-    140  |  115<H>  |  41<H>  ----------------------------<  83  3.5   |  18<L>  |  4.03<H>    Ca    8.7      26 Dec 2021 09:02                    Urinalysis Basic - ( 20 Dec 2021 19:52 )    Color: Yellow / Appearance: Clear / S.010 / pH: x  Gluc: x / Ketone: Negative  / Bili: Negative / Urobili: Negative mg/dL   Blood: x / Protein: 30 mg/dL / Nitrite: Negative   Leuk Esterase: Negative / RBC: x / WBC 0-2   Sq Epi: x / Non Sq Epi: Occasional / Bacteria: Few    Consultant(s) Notes Reviewed [ x] Yes     Care Discussed with [x ] Consultants  [x ] Patient  [x ] Family--mother Dede [phone]  [x ] /   [x ] Other; RN  Brother josecristino [phone]

## 2022-01-01 ENCOUNTER — APPOINTMENT (OUTPATIENT)
Dept: INTERNAL MEDICINE | Facility: CLINIC | Age: 62
End: 2022-01-01

## 2022-01-01 ENCOUNTER — INPATIENT (INPATIENT)
Facility: HOSPITAL | Age: 62
LOS: 66 days | Discharge: NURSING, REHAB OR LONG-TERM CARE | End: 2023-02-20
Attending: INTERNAL MEDICINE | Admitting: INTERNAL MEDICINE
Payer: MEDICAID

## 2022-01-01 ENCOUNTER — NON-APPOINTMENT (OUTPATIENT)
Age: 62
End: 2022-01-01

## 2022-01-01 ENCOUNTER — RESULT REVIEW (OUTPATIENT)
Age: 62
End: 2022-01-01

## 2022-01-01 VITALS
SYSTOLIC BLOOD PRESSURE: 138 MMHG | WEIGHT: 123.24 LBS | TEMPERATURE: 99 F | HEART RATE: 91 BPM | RESPIRATION RATE: 18 BRPM | OXYGEN SATURATION: 100 % | DIASTOLIC BLOOD PRESSURE: 77 MMHG | HEIGHT: 72 IN

## 2022-01-01 VITALS
DIASTOLIC BLOOD PRESSURE: 88 MMHG | HEART RATE: 98 BPM | WEIGHT: 125.44 LBS | TEMPERATURE: 99 F | SYSTOLIC BLOOD PRESSURE: 162 MMHG | OXYGEN SATURATION: 99 % | RESPIRATION RATE: 18 BRPM

## 2022-01-01 DIAGNOSIS — A49.8 OTHER BACTERIAL INFECTIONS OF UNSPECIFIED SITE: ICD-10-CM

## 2022-01-01 DIAGNOSIS — I10 ESSENTIAL (PRIMARY) HYPERTENSION: ICD-10-CM

## 2022-01-01 DIAGNOSIS — K25.5 CHRONIC OR UNSPECIFIED GASTRIC ULCER WITH PERFORATION: Chronic | ICD-10-CM

## 2022-01-01 DIAGNOSIS — J85.2 ABSCESS OF LUNG WITHOUT PNEUMONIA: ICD-10-CM

## 2022-01-01 DIAGNOSIS — R78.81 BACTEREMIA: ICD-10-CM

## 2022-01-01 DIAGNOSIS — R19.7 DIARRHEA, UNSPECIFIED: ICD-10-CM

## 2022-01-01 DIAGNOSIS — E87.6 HYPOKALEMIA: ICD-10-CM

## 2022-01-01 DIAGNOSIS — A41.50 GRAM-NEGATIVE SEPSIS, UNSPECIFIED: ICD-10-CM

## 2022-01-01 DIAGNOSIS — E83.51 HYPOCALCEMIA: ICD-10-CM

## 2022-01-01 DIAGNOSIS — K22.2 ESOPHAGEAL OBSTRUCTION: ICD-10-CM

## 2022-01-01 DIAGNOSIS — B19.20 UNSPECIFIED VIRAL HEPATITIS C WITHOUT HEPATIC COMA: ICD-10-CM

## 2022-01-01 DIAGNOSIS — N17.9 ACUTE KIDNEY FAILURE, UNSPECIFIED: ICD-10-CM

## 2022-01-01 DIAGNOSIS — E83.39 OTHER DISORDERS OF PHOSPHORUS METABOLISM: ICD-10-CM

## 2022-01-01 DIAGNOSIS — N18.9 CHRONIC KIDNEY DISEASE, UNSPECIFIED: ICD-10-CM

## 2022-01-01 DIAGNOSIS — Z29.9 ENCOUNTER FOR PROPHYLACTIC MEASURES, UNSPECIFIED: ICD-10-CM

## 2022-01-01 DIAGNOSIS — U07.1 COVID-19: ICD-10-CM

## 2022-01-01 DIAGNOSIS — K85.90 ACUTE PANCREATITIS WITHOUT NECROSIS OR INFECTION, UNSPECIFIED: ICD-10-CM

## 2022-01-01 DIAGNOSIS — K92.9 DISEASE OF DIGESTIVE SYSTEM, UNSPECIFIED: ICD-10-CM

## 2022-01-01 DIAGNOSIS — F10.10 ALCOHOL ABUSE, UNCOMPLICATED: ICD-10-CM

## 2022-01-01 DIAGNOSIS — E87.20 ACIDOSIS, UNSPECIFIED: ICD-10-CM

## 2022-01-01 DIAGNOSIS — A41.9 SEPSIS, UNSPECIFIED ORGANISM: ICD-10-CM

## 2022-01-01 LAB
% ALBUMIN: 47.2 % — SIGNIFICANT CHANGE UP
% ALPHA 1: 10.2 % — SIGNIFICANT CHANGE UP
% ALPHA 2: 12.8 % — SIGNIFICANT CHANGE UP
% BETA: 14.1 % — SIGNIFICANT CHANGE UP
% GAMMA: 15.7 % — SIGNIFICANT CHANGE UP
-  AMIKACIN: SIGNIFICANT CHANGE UP
-  AMOXICILLIN/CLAVULANIC ACID: SIGNIFICANT CHANGE UP
-  AMPICILLIN/SULBACTAM: SIGNIFICANT CHANGE UP
-  AMPICILLIN/SULBACTAM: SIGNIFICANT CHANGE UP
-  AMPICILLIN: SIGNIFICANT CHANGE UP
-  AMPICILLIN: SIGNIFICANT CHANGE UP
-  AZTREONAM: SIGNIFICANT CHANGE UP
-  CEFAZOLIN: SIGNIFICANT CHANGE UP
-  CEFAZOLIN: SIGNIFICANT CHANGE UP
-  CEFEPIME: SIGNIFICANT CHANGE UP
-  CEFOXITIN: SIGNIFICANT CHANGE UP
-  CEFOXITIN: SIGNIFICANT CHANGE UP
-  CEFTAZIDIME: SIGNIFICANT CHANGE UP
-  CEFTRIAXONE: SIGNIFICANT CHANGE UP
-  CEFTRIAXONE: SIGNIFICANT CHANGE UP
-  CIPROFLOXACIN: SIGNIFICANT CHANGE UP
-  ERTAPENEM: SIGNIFICANT CHANGE UP
-  ERTAPENEM: SIGNIFICANT CHANGE UP
-  GENTAMICIN: SIGNIFICANT CHANGE UP
-  IMIPENEM: SIGNIFICANT CHANGE UP
-  LEVOFLOXACIN: SIGNIFICANT CHANGE UP
-  MEROPENEM: SIGNIFICANT CHANGE UP
-  NITROFURANTOIN: SIGNIFICANT CHANGE UP
-  PIPERACILLIN/TAZOBACTAM: SIGNIFICANT CHANGE UP
-  TOBRAMYCIN: SIGNIFICANT CHANGE UP
-  TRIMETHOPRIM/SULFAMETHOXAZOLE: SIGNIFICANT CHANGE UP
-  TRIMETHOPRIM/SULFAMETHOXAZOLE: SIGNIFICANT CHANGE UP
ALBUMIN SERPL ELPH-MCNC: 1.4 G/DL — LOW (ref 3.3–5)
ALBUMIN SERPL ELPH-MCNC: 1.4 G/DL — LOW (ref 3.3–5)
ALBUMIN SERPL ELPH-MCNC: 1.5 G/DL — LOW (ref 3.3–5)
ALBUMIN SERPL ELPH-MCNC: 1.6 G/DL — LOW (ref 3.3–5)
ALBUMIN SERPL ELPH-MCNC: 1.7 G/DL — LOW (ref 3.3–5)
ALBUMIN SERPL ELPH-MCNC: 1.8 G/DL — LOW (ref 3.3–5)
ALBUMIN SERPL ELPH-MCNC: 1.9 G/DL — LOW (ref 3.3–5)
ALBUMIN SERPL ELPH-MCNC: 2 G/DL — LOW (ref 3.3–5)
ALBUMIN SERPL ELPH-MCNC: 2 G/DL — LOW (ref 3.3–5)
ALBUMIN SERPL ELPH-MCNC: 2.1 G/DL — LOW (ref 3.3–5)
ALBUMIN SERPL ELPH-MCNC: 2.3 G/DL — LOW (ref 3.3–5)
ALBUMIN SERPL ELPH-MCNC: 2.3 G/DL — LOW (ref 3.6–5.5)
ALBUMIN/GLOB SERPL ELPH: 0.9 RATIO — SIGNIFICANT CHANGE UP
ALDOST SERPL-MCNC: <3 NG/DL — SIGNIFICANT CHANGE UP
ALP SERPL-CCNC: 1226 U/L — HIGH (ref 40–120)
ALP SERPL-CCNC: 130 U/L — HIGH (ref 40–120)
ALP SERPL-CCNC: 146 U/L — HIGH (ref 40–120)
ALP SERPL-CCNC: 154 U/L — HIGH (ref 40–120)
ALP SERPL-CCNC: 162 U/L — HIGH (ref 40–120)
ALP SERPL-CCNC: 167 U/L — HIGH (ref 40–120)
ALP SERPL-CCNC: 177 U/L — HIGH (ref 40–120)
ALP SERPL-CCNC: 183 U/L — HIGH (ref 40–120)
ALP SERPL-CCNC: 184 U/L — HIGH (ref 40–120)
ALP SERPL-CCNC: 196 U/L — HIGH (ref 40–120)
ALP SERPL-CCNC: 223 U/L — HIGH (ref 40–120)
ALP SERPL-CCNC: 274 U/L — HIGH (ref 40–120)
ALP SERPL-CCNC: 307 U/L — HIGH (ref 40–120)
ALP SERPL-CCNC: 321 U/L — HIGH (ref 40–120)
ALP SERPL-CCNC: 333 U/L — HIGH (ref 40–120)
ALP SERPL-CCNC: 354 U/L — HIGH (ref 40–120)
ALP SERPL-CCNC: 470 U/L — HIGH (ref 40–120)
ALP SERPL-CCNC: 496 U/L — HIGH (ref 40–120)
ALP SERPL-CCNC: 605 U/L — HIGH (ref 40–120)
ALP SERPL-CCNC: 777 U/L — HIGH (ref 40–120)
ALP SERPL-CCNC: 796 U/L — HIGH (ref 40–120)
ALP SERPL-CCNC: 837 U/L — HIGH (ref 40–120)
ALP SERPL-CCNC: 841 U/L — HIGH (ref 40–120)
ALP SERPL-CCNC: 846 U/L — HIGH (ref 40–120)
ALP SERPL-CCNC: 967 U/L — HIGH (ref 40–120)
ALPHA1 GLOB SERPL ELPH-MCNC: 0.5 G/DL — HIGH (ref 0.1–0.4)
ALPHA2 GLOB SERPL ELPH-MCNC: 0.6 G/DL — SIGNIFICANT CHANGE UP (ref 0.5–1)
ALT FLD-CCNC: 1008 U/L — HIGH (ref 12–78)
ALT FLD-CCNC: 110 U/L — HIGH (ref 12–78)
ALT FLD-CCNC: 168 U/L — HIGH (ref 12–78)
ALT FLD-CCNC: 18 U/L — SIGNIFICANT CHANGE UP (ref 4–41)
ALT FLD-CCNC: 20 U/L — SIGNIFICANT CHANGE UP (ref 4–41)
ALT FLD-CCNC: 205 U/L — HIGH (ref 12–78)
ALT FLD-CCNC: 21 U/L — SIGNIFICANT CHANGE UP (ref 4–41)
ALT FLD-CCNC: 22 U/L — SIGNIFICANT CHANGE UP (ref 4–41)
ALT FLD-CCNC: 22 U/L — SIGNIFICANT CHANGE UP (ref 4–41)
ALT FLD-CCNC: 23 U/L — SIGNIFICANT CHANGE UP (ref 4–41)
ALT FLD-CCNC: 24 U/L — SIGNIFICANT CHANGE UP (ref 4–41)
ALT FLD-CCNC: 252 U/L — HIGH (ref 12–78)
ALT FLD-CCNC: 367 U/L — HIGH (ref 12–78)
ALT FLD-CCNC: 40 U/L — SIGNIFICANT CHANGE UP (ref 12–78)
ALT FLD-CCNC: 402 U/L — HIGH (ref 12–78)
ALT FLD-CCNC: 433 U/L — HIGH (ref 12–78)
ALT FLD-CCNC: 56 U/L — SIGNIFICANT CHANGE UP (ref 12–78)
ALT FLD-CCNC: 640 U/L — HIGH (ref 12–78)
ALT FLD-CCNC: 69 U/L — SIGNIFICANT CHANGE UP (ref 12–78)
ALT FLD-CCNC: 84 U/L — HIGH (ref 12–78)
ALT FLD-CCNC: 912 U/L — HIGH (ref 12–78)
ALT FLD-CCNC: 917 U/L — HIGH (ref 12–78)
ALT FLD-CCNC: 92 U/L — HIGH (ref 12–78)
AMMONIA BLD-MCNC: 22 UMOL/L — SIGNIFICANT CHANGE UP (ref 11–32)
AMMONIA BLD-MCNC: 23 UMOL/L — SIGNIFICANT CHANGE UP (ref 11–32)
AMMONIA BLD-MCNC: 26 UMOL/L — SIGNIFICANT CHANGE UP (ref 11–32)
AMMONIA BLD-MCNC: 32 UMOL/L — SIGNIFICANT CHANGE UP (ref 11–32)
AMMONIA BLD-MCNC: 34 UMOL/L — HIGH (ref 11–32)
AMMONIA BLD-MCNC: 35 UMOL/L — HIGH (ref 11–32)
ANION GAP SERPL CALC-SCNC: 10 MMOL/L — SIGNIFICANT CHANGE UP (ref 5–17)
ANION GAP SERPL CALC-SCNC: 11 MMOL/L — SIGNIFICANT CHANGE UP (ref 5–17)
ANION GAP SERPL CALC-SCNC: 11 MMOL/L — SIGNIFICANT CHANGE UP (ref 5–17)
ANION GAP SERPL CALC-SCNC: 12 MMOL/L — SIGNIFICANT CHANGE UP (ref 5–17)
ANION GAP SERPL CALC-SCNC: 12 MMOL/L — SIGNIFICANT CHANGE UP (ref 7–14)
ANION GAP SERPL CALC-SCNC: 13 MMOL/L — SIGNIFICANT CHANGE UP (ref 5–17)
ANION GAP SERPL CALC-SCNC: 13 MMOL/L — SIGNIFICANT CHANGE UP (ref 5–17)
ANION GAP SERPL CALC-SCNC: 14 MMOL/L — SIGNIFICANT CHANGE UP (ref 7–14)
ANION GAP SERPL CALC-SCNC: 14 MMOL/L — SIGNIFICANT CHANGE UP (ref 7–14)
ANION GAP SERPL CALC-SCNC: 15 MMOL/L — HIGH (ref 7–14)
ANION GAP SERPL CALC-SCNC: 15 MMOL/L — HIGH (ref 7–14)
ANION GAP SERPL CALC-SCNC: 15 MMOL/L — SIGNIFICANT CHANGE UP (ref 5–17)
ANION GAP SERPL CALC-SCNC: 15 MMOL/L — SIGNIFICANT CHANGE UP (ref 5–17)
ANION GAP SERPL CALC-SCNC: 16 MMOL/L — HIGH (ref 7–14)
ANION GAP SERPL CALC-SCNC: 17 MMOL/L — HIGH (ref 7–14)
ANION GAP SERPL CALC-SCNC: 5 MMOL/L — SIGNIFICANT CHANGE UP (ref 5–17)
ANION GAP SERPL CALC-SCNC: 6 MMOL/L — SIGNIFICANT CHANGE UP (ref 5–17)
ANION GAP SERPL CALC-SCNC: 8 MMOL/L — SIGNIFICANT CHANGE UP (ref 5–17)
ANION GAP SERPL CALC-SCNC: 8 MMOL/L — SIGNIFICANT CHANGE UP (ref 5–17)
ANION GAP SERPL CALC-SCNC: 9 MMOL/L — SIGNIFICANT CHANGE UP (ref 5–17)
ANISOCYTOSIS BLD QL: SLIGHT — SIGNIFICANT CHANGE UP
APPEARANCE UR: CLEAR — SIGNIFICANT CHANGE UP
APTT BLD: 28.7 SEC — SIGNIFICANT CHANGE UP (ref 27.5–35.5)
APTT BLD: 29.2 SEC — SIGNIFICANT CHANGE UP (ref 27–36.3)
AST SERPL-CCNC: 11 U/L — SIGNIFICANT CHANGE UP (ref 4–40)
AST SERPL-CCNC: 1102 U/L — HIGH (ref 15–37)
AST SERPL-CCNC: 12 U/L — SIGNIFICANT CHANGE UP (ref 4–40)
AST SERPL-CCNC: 13 U/L — LOW (ref 15–37)
AST SERPL-CCNC: 13 U/L — SIGNIFICANT CHANGE UP (ref 4–40)
AST SERPL-CCNC: 14 U/L — LOW (ref 15–37)
AST SERPL-CCNC: 14 U/L — LOW (ref 15–37)
AST SERPL-CCNC: 14 U/L — SIGNIFICANT CHANGE UP (ref 4–40)
AST SERPL-CCNC: 15 U/L — SIGNIFICANT CHANGE UP (ref 4–40)
AST SERPL-CCNC: 158 U/L — HIGH (ref 15–37)
AST SERPL-CCNC: 16 U/L — SIGNIFICANT CHANGE UP (ref 4–40)
AST SERPL-CCNC: 16 U/L — SIGNIFICANT CHANGE UP (ref 4–40)
AST SERPL-CCNC: 160 U/L — HIGH (ref 15–37)
AST SERPL-CCNC: 165 U/L — HIGH (ref 15–37)
AST SERPL-CCNC: 17 U/L — SIGNIFICANT CHANGE UP (ref 15–37)
AST SERPL-CCNC: 20 U/L — SIGNIFICANT CHANGE UP (ref 15–37)
AST SERPL-CCNC: 21 U/L — SIGNIFICANT CHANGE UP (ref 4–40)
AST SERPL-CCNC: 22 U/L — SIGNIFICANT CHANGE UP (ref 4–40)
AST SERPL-CCNC: 24 U/L — SIGNIFICANT CHANGE UP (ref 15–37)
AST SERPL-CCNC: 373 U/L — HIGH (ref 15–37)
AST SERPL-CCNC: 44 U/L — HIGH (ref 15–37)
AST SERPL-CCNC: 64 U/L — HIGH (ref 15–37)
AST SERPL-CCNC: 78 U/L — HIGH (ref 15–37)
AST SERPL-CCNC: 872 U/L — HIGH (ref 15–37)
AST SERPL-CCNC: 923 U/L — HIGH (ref 15–37)
B-GLOBULIN SERPL ELPH-MCNC: 0.7 G/DL — SIGNIFICANT CHANGE UP (ref 0.5–1)
BACTERIA # UR AUTO: NEGATIVE — SIGNIFICANT CHANGE UP
BASOPHILS # BLD AUTO: 0 K/UL — SIGNIFICANT CHANGE UP (ref 0–0.2)
BASOPHILS # BLD AUTO: 0.01 K/UL — SIGNIFICANT CHANGE UP (ref 0–0.2)
BASOPHILS # BLD AUTO: 0.02 K/UL — SIGNIFICANT CHANGE UP (ref 0–0.2)
BASOPHILS # BLD AUTO: 0.03 K/UL — SIGNIFICANT CHANGE UP (ref 0–0.2)
BASOPHILS # BLD AUTO: 0.04 K/UL — SIGNIFICANT CHANGE UP (ref 0–0.2)
BASOPHILS # BLD AUTO: 0.05 K/UL — SIGNIFICANT CHANGE UP (ref 0–0.2)
BASOPHILS # BLD AUTO: 0.08 K/UL — SIGNIFICANT CHANGE UP (ref 0–0.2)
BASOPHILS # BLD AUTO: 0.1 K/UL — SIGNIFICANT CHANGE UP (ref 0–0.2)
BASOPHILS NFR BLD AUTO: 0 % — SIGNIFICANT CHANGE UP (ref 0–2)
BASOPHILS NFR BLD AUTO: 0.1 % — SIGNIFICANT CHANGE UP (ref 0–2)
BASOPHILS NFR BLD AUTO: 0.1 % — SIGNIFICANT CHANGE UP (ref 0–2)
BASOPHILS NFR BLD AUTO: 0.2 % — SIGNIFICANT CHANGE UP (ref 0–2)
BASOPHILS NFR BLD AUTO: 0.3 % — SIGNIFICANT CHANGE UP (ref 0–2)
BASOPHILS NFR BLD AUTO: 0.7 % — SIGNIFICANT CHANGE UP (ref 0–2)
BASOPHILS NFR BLD AUTO: 1.4 % — SIGNIFICANT CHANGE UP (ref 0–2)
BILIRUB DIRECT SERPL-MCNC: <0.2 MG/DL — SIGNIFICANT CHANGE UP (ref 0–0.3)
BILIRUB INDIRECT FLD-MCNC: SIGNIFICANT CHANGE UP MG/DL (ref 0–1)
BILIRUB SERPL-MCNC: 0.2 MG/DL — SIGNIFICANT CHANGE UP (ref 0.2–1.2)
BILIRUB SERPL-MCNC: 0.3 MG/DL — SIGNIFICANT CHANGE UP (ref 0.2–1.2)
BILIRUB SERPL-MCNC: 0.4 MG/DL — SIGNIFICANT CHANGE UP (ref 0.2–1.2)
BILIRUB SERPL-MCNC: 0.4 MG/DL — SIGNIFICANT CHANGE UP (ref 0.2–1.2)
BILIRUB SERPL-MCNC: 0.5 MG/DL — SIGNIFICANT CHANGE UP (ref 0.2–1.2)
BILIRUB SERPL-MCNC: <0.2 MG/DL — SIGNIFICANT CHANGE UP (ref 0.2–1.2)
BILIRUB UR-MCNC: NEGATIVE — SIGNIFICANT CHANGE UP
BLD GP AB SCN SERPL QL: NEGATIVE — SIGNIFICANT CHANGE UP
BLD GP AB SCN SERPL QL: NEGATIVE — SIGNIFICANT CHANGE UP
BLD GP AB SCN SERPL QL: SIGNIFICANT CHANGE UP
BUN SERPL-MCNC: 116 MG/DL — HIGH (ref 7–23)
BUN SERPL-MCNC: 119 MG/DL — HIGH (ref 7–23)
BUN SERPL-MCNC: 44 MG/DL — HIGH (ref 7–23)
BUN SERPL-MCNC: 46 MG/DL — HIGH (ref 7–23)
BUN SERPL-MCNC: 49 MG/DL — HIGH (ref 7–23)
BUN SERPL-MCNC: 49 MG/DL — HIGH (ref 7–23)
BUN SERPL-MCNC: 50 MG/DL — HIGH (ref 7–23)
BUN SERPL-MCNC: 53 MG/DL — HIGH (ref 7–23)
BUN SERPL-MCNC: 54 MG/DL — HIGH (ref 7–23)
BUN SERPL-MCNC: 55 MG/DL — HIGH (ref 7–23)
BUN SERPL-MCNC: 57 MG/DL — HIGH (ref 7–23)
BUN SERPL-MCNC: 58 MG/DL — HIGH (ref 7–23)
BUN SERPL-MCNC: 60 MG/DL — HIGH (ref 7–23)
BUN SERPL-MCNC: 61 MG/DL — HIGH (ref 7–23)
BUN SERPL-MCNC: 62 MG/DL — HIGH (ref 7–23)
BUN SERPL-MCNC: 64 MG/DL — HIGH (ref 7–23)
BUN SERPL-MCNC: 64 MG/DL — HIGH (ref 7–23)
BUN SERPL-MCNC: 67 MG/DL — HIGH (ref 7–23)
BUN SERPL-MCNC: 67 MG/DL — HIGH (ref 7–23)
BUN SERPL-MCNC: 68 MG/DL — HIGH (ref 7–23)
BUN SERPL-MCNC: 70 MG/DL — HIGH (ref 7–23)
BUN SERPL-MCNC: 74 MG/DL — HIGH (ref 7–23)
BUN SERPL-MCNC: 74 MG/DL — HIGH (ref 7–23)
BUN SERPL-MCNC: 83 MG/DL — HIGH (ref 7–23)
BUN SERPL-MCNC: 86 MG/DL — HIGH (ref 7–23)
BUN SERPL-MCNC: 87 MG/DL — HIGH (ref 7–23)
BUN SERPL-MCNC: 93 MG/DL — HIGH (ref 7–23)
BURR CELLS BLD QL SMEAR: PRESENT — SIGNIFICANT CHANGE UP
CA-I BLD-SCNC: 0.96 MMOL/L — LOW (ref 1.15–1.29)
CA-I BLD-SCNC: 0.96 MMOL/L — LOW (ref 1.15–1.29)
CALCIUM SERPL-MCNC: 5.3 MG/DL — CRITICAL LOW (ref 8.5–10.1)
CALCIUM SERPL-MCNC: 5.4 MG/DL — CRITICAL LOW (ref 8.5–10.1)
CALCIUM SERPL-MCNC: 6.4 MG/DL — CRITICAL LOW (ref 8.5–10.1)
CALCIUM SERPL-MCNC: 6.4 MG/DL — CRITICAL LOW (ref 8.5–10.1)
CALCIUM SERPL-MCNC: 6.5 MG/DL — CRITICAL LOW (ref 8.4–10.5)
CALCIUM SERPL-MCNC: 6.6 MG/DL — LOW (ref 8.4–10.5)
CALCIUM SERPL-MCNC: 6.8 MG/DL — LOW (ref 8.4–10.5)
CALCIUM SERPL-MCNC: 6.8 MG/DL — LOW (ref 8.5–10.1)
CALCIUM SERPL-MCNC: 6.8 MG/DL — LOW (ref 8.5–10.1)
CALCIUM SERPL-MCNC: 6.9 MG/DL — LOW (ref 8.4–10.5)
CALCIUM SERPL-MCNC: 7 MG/DL — LOW (ref 8.4–10.5)
CALCIUM SERPL-MCNC: 7 MG/DL — LOW (ref 8.4–10.5)
CALCIUM SERPL-MCNC: 7.1 MG/DL — LOW (ref 8.5–10.1)
CALCIUM SERPL-MCNC: 7.1 MG/DL — LOW (ref 8.5–10.1)
CALCIUM SERPL-MCNC: 7.2 MG/DL — LOW (ref 8.5–10.1)
CALCIUM SERPL-MCNC: 7.2 MG/DL — LOW (ref 8.5–10.1)
CALCIUM SERPL-MCNC: 7.3 MG/DL — LOW (ref 8.5–10.1)
CALCIUM SERPL-MCNC: 7.3 MG/DL — LOW (ref 8.5–10.1)
CALCIUM SERPL-MCNC: 7.4 MG/DL — LOW (ref 8.4–10.5)
CALCIUM SERPL-MCNC: 7.4 MG/DL — LOW (ref 8.5–10.1)
CALCIUM SERPL-MCNC: 7.5 MG/DL — LOW (ref 8.4–10.5)
CALCIUM SERPL-MCNC: 7.5 MG/DL — LOW (ref 8.4–10.5)
CALCIUM SERPL-MCNC: 7.5 MG/DL — LOW (ref 8.5–10.1)
CALCIUM SERPL-MCNC: 7.6 MG/DL — LOW (ref 8.5–10.1)
CALCIUM SERPL-MCNC: 7.6 MG/DL — LOW (ref 8.5–10.1)
CALCIUM SERPL-MCNC: 7.7 MG/DL — LOW (ref 8.4–10.5)
CALCIUM SERPL-MCNC: 7.8 MG/DL — LOW (ref 8.5–10.1)
CALCIUM SERPL-MCNC: 7.9 MG/DL — LOW (ref 8.5–10.1)
CHLORIDE SERPL-SCNC: 106 MMOL/L — SIGNIFICANT CHANGE UP (ref 98–107)
CHLORIDE SERPL-SCNC: 106 MMOL/L — SIGNIFICANT CHANGE UP (ref 98–107)
CHLORIDE SERPL-SCNC: 107 MMOL/L — SIGNIFICANT CHANGE UP (ref 96–108)
CHLORIDE SERPL-SCNC: 107 MMOL/L — SIGNIFICANT CHANGE UP (ref 98–107)
CHLORIDE SERPL-SCNC: 108 MMOL/L — HIGH (ref 98–107)
CHLORIDE SERPL-SCNC: 108 MMOL/L — SIGNIFICANT CHANGE UP (ref 96–108)
CHLORIDE SERPL-SCNC: 108 MMOL/L — SIGNIFICANT CHANGE UP (ref 96–108)
CHLORIDE SERPL-SCNC: 109 MMOL/L — HIGH (ref 96–108)
CHLORIDE SERPL-SCNC: 109 MMOL/L — HIGH (ref 96–108)
CHLORIDE SERPL-SCNC: 109 MMOL/L — HIGH (ref 98–107)
CHLORIDE SERPL-SCNC: 110 MMOL/L — HIGH (ref 96–108)
CHLORIDE SERPL-SCNC: 110 MMOL/L — HIGH (ref 98–107)
CHLORIDE SERPL-SCNC: 111 MMOL/L — HIGH (ref 96–108)
CHLORIDE SERPL-SCNC: 111 MMOL/L — HIGH (ref 98–107)
CHLORIDE SERPL-SCNC: 112 MMOL/L — HIGH (ref 96–108)
CHLORIDE SERPL-SCNC: 112 MMOL/L — HIGH (ref 96–108)
CHLORIDE SERPL-SCNC: 113 MMOL/L — HIGH (ref 98–107)
CHLORIDE SERPL-SCNC: 114 MMOL/L — HIGH (ref 96–108)
CHLORIDE SERPL-SCNC: 114 MMOL/L — HIGH (ref 96–108)
CHLORIDE SERPL-SCNC: 116 MMOL/L — HIGH (ref 96–108)
CHLORIDE SERPL-SCNC: 116 MMOL/L — HIGH (ref 98–107)
CHLORIDE SERPL-SCNC: 118 MMOL/L — HIGH (ref 96–108)
CHLORIDE SERPL-SCNC: 119 MMOL/L — HIGH (ref 96–108)
CK MB BLD-MCNC: 14.7 % — HIGH (ref 0–2.5)
CK MB CFR SERPL CALC: 4.7 NG/ML — SIGNIFICANT CHANGE UP
CK SERPL-CCNC: 32 U/L — SIGNIFICANT CHANGE UP (ref 30–200)
CO2 SERPL-SCNC: 15 MMOL/L — LOW (ref 22–31)
CO2 SERPL-SCNC: 16 MMOL/L — LOW (ref 22–31)
CO2 SERPL-SCNC: 17 MMOL/L — LOW (ref 22–31)
CO2 SERPL-SCNC: 18 MMOL/L — LOW (ref 22–31)
CO2 SERPL-SCNC: 19 MMOL/L — LOW (ref 22–31)
CO2 SERPL-SCNC: 20 MMOL/L — LOW (ref 22–31)
CO2 SERPL-SCNC: 21 MMOL/L — LOW (ref 22–31)
CO2 SERPL-SCNC: 22 MMOL/L — SIGNIFICANT CHANGE UP (ref 22–31)
CO2 SERPL-SCNC: 22 MMOL/L — SIGNIFICANT CHANGE UP (ref 22–31)
CO2 SERPL-SCNC: 23 MMOL/L — SIGNIFICANT CHANGE UP (ref 22–31)
CO2 SERPL-SCNC: 24 MMOL/L — SIGNIFICANT CHANGE UP (ref 22–31)
CO2 SERPL-SCNC: 25 MMOL/L — SIGNIFICANT CHANGE UP (ref 22–31)
CO2 SERPL-SCNC: 26 MMOL/L — SIGNIFICANT CHANGE UP (ref 22–31)
CO2 SERPL-SCNC: 26 MMOL/L — SIGNIFICANT CHANGE UP (ref 22–31)
COLOR SPEC: SIGNIFICANT CHANGE UP
CREAT ?TM UR-MCNC: 46 MG/DL — SIGNIFICANT CHANGE UP
CREAT SERPL-MCNC: 3.73 MG/DL — HIGH (ref 0.5–1.3)
CREAT SERPL-MCNC: 4.01 MG/DL — HIGH (ref 0.5–1.3)
CREAT SERPL-MCNC: 4.07 MG/DL — HIGH (ref 0.5–1.3)
CREAT SERPL-MCNC: 4.34 MG/DL — HIGH (ref 0.5–1.3)
CREAT SERPL-MCNC: 4.5 MG/DL — HIGH (ref 0.5–1.3)
CREAT SERPL-MCNC: 4.5 MG/DL — HIGH (ref 0.5–1.3)
CREAT SERPL-MCNC: 4.58 MG/DL — HIGH (ref 0.5–1.3)
CREAT SERPL-MCNC: 5.21 MG/DL — HIGH (ref 0.5–1.3)
CREAT SERPL-MCNC: 5.21 MG/DL — HIGH (ref 0.5–1.3)
CREAT SERPL-MCNC: 5.22 MG/DL — HIGH (ref 0.5–1.3)
CREAT SERPL-MCNC: 5.28 MG/DL — HIGH (ref 0.5–1.3)
CREAT SERPL-MCNC: 5.38 MG/DL — HIGH (ref 0.5–1.3)
CREAT SERPL-MCNC: 5.39 MG/DL — HIGH (ref 0.5–1.3)
CREAT SERPL-MCNC: 5.4 MG/DL — HIGH (ref 0.5–1.3)
CREAT SERPL-MCNC: 5.42 MG/DL — HIGH (ref 0.5–1.3)
CREAT SERPL-MCNC: 5.44 MG/DL — HIGH (ref 0.5–1.3)
CREAT SERPL-MCNC: 5.56 MG/DL — HIGH (ref 0.5–1.3)
CREAT SERPL-MCNC: 5.57 MG/DL — HIGH (ref 0.5–1.3)
CREAT SERPL-MCNC: 5.65 MG/DL — HIGH (ref 0.5–1.3)
CREAT SERPL-MCNC: 5.7 MG/DL — HIGH (ref 0.5–1.3)
CREAT SERPL-MCNC: 5.79 MG/DL — HIGH (ref 0.5–1.3)
CREAT SERPL-MCNC: 5.81 MG/DL — HIGH (ref 0.5–1.3)
CREAT SERPL-MCNC: 5.84 MG/DL — HIGH (ref 0.5–1.3)
CREAT SERPL-MCNC: 5.85 MG/DL — HIGH (ref 0.5–1.3)
CREAT SERPL-MCNC: 5.86 MG/DL — HIGH (ref 0.5–1.3)
CREAT SERPL-MCNC: 5.86 MG/DL — HIGH (ref 0.5–1.3)
CREAT SERPL-MCNC: 5.88 MG/DL — HIGH (ref 0.5–1.3)
CREAT SERPL-MCNC: 5.89 MG/DL — HIGH (ref 0.5–1.3)
CREAT SERPL-MCNC: 5.94 MG/DL — HIGH (ref 0.5–1.3)
CREAT SERPL-MCNC: 5.95 MG/DL — HIGH (ref 0.5–1.3)
CREAT SERPL-MCNC: 5.98 MG/DL — HIGH (ref 0.5–1.3)
CREAT SERPL-MCNC: 6 MG/DL — HIGH (ref 0.5–1.3)
CREAT SERPL-MCNC: 6.21 MG/DL — HIGH (ref 0.5–1.3)
CULTURE RESULTS: SIGNIFICANT CHANGE UP
D DIMER BLD IA.RAPID-MCNC: 1757 NG/ML DDU — HIGH
D DIMER BLD IA.RAPID-MCNC: 3160 NG/ML DDU — HIGH
DIFF PNL FLD: ABNORMAL
EGFR: 10 ML/MIN/1.73M2 — LOW
EGFR: 11 ML/MIN/1.73M2 — LOW
EGFR: 12 ML/MIN/1.73M2 — LOW
EGFR: 14 ML/MIN/1.73M2 — LOW
EGFR: 15 ML/MIN/1.73M2 — LOW
EGFR: 16 ML/MIN/1.73M2 — LOW
EGFR: 16 ML/MIN/1.73M2 — LOW
EGFR: 17 ML/MIN/1.73M2 — LOW
EGFR: 9 ML/MIN/1.73M2 — LOW
ELASTASE PANC STL-MCNT: <50 CD:794062645 — LOW
ELLIPTOCYTES BLD QL SMEAR: SLIGHT — SIGNIFICANT CHANGE UP
EOSINOPHIL # BLD AUTO: 0 K/UL — SIGNIFICANT CHANGE UP (ref 0–0.5)
EOSINOPHIL # BLD AUTO: 0.01 K/UL — SIGNIFICANT CHANGE UP (ref 0–0.5)
EOSINOPHIL # BLD AUTO: 0.03 K/UL — SIGNIFICANT CHANGE UP (ref 0–0.5)
EOSINOPHIL # BLD AUTO: 0.07 K/UL — SIGNIFICANT CHANGE UP (ref 0–0.5)
EOSINOPHIL # BLD AUTO: 0.07 K/UL — SIGNIFICANT CHANGE UP (ref 0–0.5)
EOSINOPHIL # BLD AUTO: 0.08 K/UL — SIGNIFICANT CHANGE UP (ref 0–0.5)
EOSINOPHIL # BLD AUTO: 0.1 K/UL — SIGNIFICANT CHANGE UP (ref 0–0.5)
EOSINOPHIL NFR BLD AUTO: 0 % — SIGNIFICANT CHANGE UP (ref 0–6)
EOSINOPHIL NFR BLD AUTO: 0.2 % — SIGNIFICANT CHANGE UP (ref 0–6)
EOSINOPHIL NFR BLD AUTO: 0.8 % — SIGNIFICANT CHANGE UP (ref 0–6)
EOSINOPHIL NFR BLD AUTO: 1 % — SIGNIFICANT CHANGE UP (ref 0–6)
EOSINOPHIL NFR BLD AUTO: 1.1 % — SIGNIFICANT CHANGE UP (ref 0–6)
EOSINOPHIL NFR BLD AUTO: 1.2 % — SIGNIFICANT CHANGE UP (ref 0–6)
EPI CELLS # UR: 1 /HPF — SIGNIFICANT CHANGE UP (ref 0–5)
FERRITIN SERPL-MCNC: 264 NG/ML — SIGNIFICANT CHANGE UP (ref 30–400)
FIBRINOGEN PPP-MCNC: 802 MG/DL — HIGH (ref 340–550)
FLUAV AG NPH QL: SIGNIFICANT CHANGE UP
FLUBV AG NPH QL: SIGNIFICANT CHANGE UP
GAMMA GLOBULIN: 0.8 G/DL — SIGNIFICANT CHANGE UP (ref 0.6–1.6)
GAMMA INTERFERON BACKGROUND BLD IA-ACNC: 0.03 IU/ML — SIGNIFICANT CHANGE UP
GAS PNL BLDA: SIGNIFICANT CHANGE UP
GI PCR PANEL: SIGNIFICANT CHANGE UP
GLUCOSE BLDC GLUCOMTR-MCNC: 101 MG/DL — HIGH (ref 70–99)
GLUCOSE BLDC GLUCOMTR-MCNC: 108 MG/DL — HIGH (ref 70–99)
GLUCOSE BLDC GLUCOMTR-MCNC: 111 MG/DL — HIGH (ref 70–99)
GLUCOSE BLDC GLUCOMTR-MCNC: 120 MG/DL — HIGH (ref 70–99)
GLUCOSE BLDC GLUCOMTR-MCNC: 121 MG/DL — HIGH (ref 70–99)
GLUCOSE BLDC GLUCOMTR-MCNC: 122 MG/DL — HIGH (ref 70–99)
GLUCOSE BLDC GLUCOMTR-MCNC: 125 MG/DL — HIGH (ref 70–99)
GLUCOSE BLDC GLUCOMTR-MCNC: 128 MG/DL — HIGH (ref 70–99)
GLUCOSE BLDC GLUCOMTR-MCNC: 135 MG/DL — HIGH (ref 70–99)
GLUCOSE BLDC GLUCOMTR-MCNC: 138 MG/DL — HIGH (ref 70–99)
GLUCOSE BLDC GLUCOMTR-MCNC: 147 MG/DL — HIGH (ref 70–99)
GLUCOSE BLDC GLUCOMTR-MCNC: 72 MG/DL — SIGNIFICANT CHANGE UP (ref 70–99)
GLUCOSE BLDC GLUCOMTR-MCNC: 77 MG/DL — SIGNIFICANT CHANGE UP (ref 70–99)
GLUCOSE BLDC GLUCOMTR-MCNC: 79 MG/DL — SIGNIFICANT CHANGE UP (ref 70–99)
GLUCOSE BLDC GLUCOMTR-MCNC: 84 MG/DL — SIGNIFICANT CHANGE UP (ref 70–99)
GLUCOSE BLDC GLUCOMTR-MCNC: 88 MG/DL — SIGNIFICANT CHANGE UP (ref 70–99)
GLUCOSE BLDC GLUCOMTR-MCNC: 88 MG/DL — SIGNIFICANT CHANGE UP (ref 70–99)
GLUCOSE BLDC GLUCOMTR-MCNC: 90 MG/DL — SIGNIFICANT CHANGE UP (ref 70–99)
GLUCOSE BLDC GLUCOMTR-MCNC: 91 MG/DL — SIGNIFICANT CHANGE UP (ref 70–99)
GLUCOSE BLDC GLUCOMTR-MCNC: 95 MG/DL — SIGNIFICANT CHANGE UP (ref 70–99)
GLUCOSE SERPL-MCNC: 102 MG/DL — HIGH (ref 70–99)
GLUCOSE SERPL-MCNC: 104 MG/DL — HIGH (ref 70–99)
GLUCOSE SERPL-MCNC: 110 MG/DL — HIGH (ref 70–99)
GLUCOSE SERPL-MCNC: 113 MG/DL — HIGH (ref 70–99)
GLUCOSE SERPL-MCNC: 114 MG/DL — HIGH (ref 70–99)
GLUCOSE SERPL-MCNC: 131 MG/DL — HIGH (ref 70–99)
GLUCOSE SERPL-MCNC: 140 MG/DL — HIGH (ref 70–99)
GLUCOSE SERPL-MCNC: 157 MG/DL — HIGH (ref 70–99)
GLUCOSE SERPL-MCNC: 159 MG/DL — HIGH (ref 70–99)
GLUCOSE SERPL-MCNC: 203 MG/DL — HIGH (ref 70–99)
GLUCOSE SERPL-MCNC: 228 MG/DL — HIGH (ref 70–99)
GLUCOSE SERPL-MCNC: 313 MG/DL — HIGH (ref 70–99)
GLUCOSE SERPL-MCNC: 49 MG/DL — CRITICAL LOW (ref 70–99)
GLUCOSE SERPL-MCNC: 59 MG/DL — LOW (ref 70–99)
GLUCOSE SERPL-MCNC: 69 MG/DL — LOW (ref 70–99)
GLUCOSE SERPL-MCNC: 73 MG/DL — SIGNIFICANT CHANGE UP (ref 70–99)
GLUCOSE SERPL-MCNC: 75 MG/DL — SIGNIFICANT CHANGE UP (ref 70–99)
GLUCOSE SERPL-MCNC: 76 MG/DL — SIGNIFICANT CHANGE UP (ref 70–99)
GLUCOSE SERPL-MCNC: 78 MG/DL — SIGNIFICANT CHANGE UP (ref 70–99)
GLUCOSE SERPL-MCNC: 78 MG/DL — SIGNIFICANT CHANGE UP (ref 70–99)
GLUCOSE SERPL-MCNC: 83 MG/DL — SIGNIFICANT CHANGE UP (ref 70–99)
GLUCOSE SERPL-MCNC: 86 MG/DL — SIGNIFICANT CHANGE UP (ref 70–99)
GLUCOSE SERPL-MCNC: 86 MG/DL — SIGNIFICANT CHANGE UP (ref 70–99)
GLUCOSE SERPL-MCNC: 87 MG/DL — SIGNIFICANT CHANGE UP (ref 70–99)
GLUCOSE SERPL-MCNC: 90 MG/DL — SIGNIFICANT CHANGE UP (ref 70–99)
GLUCOSE SERPL-MCNC: 92 MG/DL — SIGNIFICANT CHANGE UP (ref 70–99)
GLUCOSE SERPL-MCNC: 96 MG/DL — SIGNIFICANT CHANGE UP (ref 70–99)
GLUCOSE SERPL-MCNC: 97 MG/DL — SIGNIFICANT CHANGE UP (ref 70–99)
GLUCOSE SERPL-MCNC: 98 MG/DL — SIGNIFICANT CHANGE UP (ref 70–99)
GLUCOSE UR QL: NEGATIVE — SIGNIFICANT CHANGE UP
GRAM STN FLD: SIGNIFICANT CHANGE UP
HAV IGM SER-ACNC: SIGNIFICANT CHANGE UP
HBV CORE IGM SER-ACNC: SIGNIFICANT CHANGE UP
HBV SURFACE AB SER-ACNC: 15 MIU/ML — SIGNIFICANT CHANGE UP
HBV SURFACE AG SER-ACNC: SIGNIFICANT CHANGE UP
HBV SURFACE AG SER-ACNC: SIGNIFICANT CHANGE UP
HCT VFR BLD CALC: 20 % — CRITICAL LOW (ref 39–50)
HCT VFR BLD CALC: 22.1 % — LOW (ref 39–50)
HCT VFR BLD CALC: 22.8 % — LOW (ref 39–50)
HCT VFR BLD CALC: 23.4 % — LOW (ref 39–50)
HCT VFR BLD CALC: 23.5 % — LOW (ref 39–50)
HCT VFR BLD CALC: 23.5 % — LOW (ref 39–50)
HCT VFR BLD CALC: 23.8 % — LOW (ref 39–50)
HCT VFR BLD CALC: 23.8 % — LOW (ref 39–50)
HCT VFR BLD CALC: 24 % — LOW (ref 39–50)
HCT VFR BLD CALC: 24.1 % — LOW (ref 39–50)
HCT VFR BLD CALC: 24.4 % — LOW (ref 39–50)
HCT VFR BLD CALC: 24.4 % — LOW (ref 39–50)
HCT VFR BLD CALC: 24.5 % — LOW (ref 39–50)
HCT VFR BLD CALC: 25.7 % — LOW (ref 39–50)
HCT VFR BLD CALC: 26.1 % — LOW (ref 39–50)
HCT VFR BLD CALC: 26.3 % — LOW (ref 39–50)
HCT VFR BLD CALC: 26.5 % — LOW (ref 39–50)
HCT VFR BLD CALC: 26.7 % — LOW (ref 39–50)
HCT VFR BLD CALC: 27.5 % — LOW (ref 39–50)
HCT VFR BLD CALC: 28.3 % — LOW (ref 39–50)
HCT VFR BLD CALC: 28.3 % — LOW (ref 39–50)
HCT VFR BLD CALC: 28.6 % — LOW (ref 39–50)
HCT VFR BLD CALC: 29 % — LOW (ref 39–50)
HCT VFR BLD CALC: 29.3 % — LOW (ref 39–50)
HCT VFR BLD CALC: 29.8 % — LOW (ref 39–50)
HCT VFR BLD CALC: 30 % — LOW (ref 39–50)
HCT VFR BLD CALC: 30.5 % — LOW (ref 39–50)
HCT VFR BLD CALC: 31.3 % — LOW (ref 39–50)
HCV AB S/CO SERPL IA: 4.42 S/CO — HIGH (ref 0–0.99)
HCV AB S/CO SERPL IA: 4.97 S/CO — HIGH (ref 0–0.99)
HCV AB SERPL-IMP: ABNORMAL
HCV AB SERPL-IMP: REACTIVE
HCV RNA FLD QL NAA+PROBE: SIGNIFICANT CHANGE UP
HCV RNA FLD QL NAA+PROBE: SIGNIFICANT CHANGE UP
HCV RNA SPEC QL PROBE+SIG AMP: SIGNIFICANT CHANGE UP
HCV RNA SPEC QL PROBE+SIG AMP: SIGNIFICANT CHANGE UP
HGB BLD-MCNC: 10 G/DL — LOW (ref 13–17)
HGB BLD-MCNC: 10 G/DL — LOW (ref 13–17)
HGB BLD-MCNC: 10.1 G/DL — LOW (ref 13–17)
HGB BLD-MCNC: 6.2 G/DL — CRITICAL LOW (ref 13–17)
HGB BLD-MCNC: 7.1 G/DL — LOW (ref 13–17)
HGB BLD-MCNC: 7.1 G/DL — LOW (ref 13–17)
HGB BLD-MCNC: 7.2 G/DL — LOW (ref 13–17)
HGB BLD-MCNC: 7.3 G/DL — LOW (ref 13–17)
HGB BLD-MCNC: 7.5 G/DL — LOW (ref 13–17)
HGB BLD-MCNC: 7.6 G/DL — LOW (ref 13–17)
HGB BLD-MCNC: 7.6 G/DL — LOW (ref 13–17)
HGB BLD-MCNC: 7.7 G/DL — LOW (ref 13–17)
HGB BLD-MCNC: 7.8 G/DL — LOW (ref 13–17)
HGB BLD-MCNC: 8.3 G/DL — LOW (ref 13–17)
HGB BLD-MCNC: 8.4 G/DL — LOW (ref 13–17)
HGB BLD-MCNC: 8.9 G/DL — LOW (ref 13–17)
HGB BLD-MCNC: 9 G/DL — LOW (ref 13–17)
HGB BLD-MCNC: 9.2 G/DL — LOW (ref 13–17)
HGB BLD-MCNC: 9.3 G/DL — LOW (ref 13–17)
HGB BLD-MCNC: 9.4 G/DL — LOW (ref 13–17)
HGB BLD-MCNC: 9.5 G/DL — LOW (ref 13–17)
HGB BLD-MCNC: 9.7 G/DL — LOW (ref 13–17)
HGB BLD-MCNC: 9.8 G/DL — LOW (ref 13–17)
HGB BLD-MCNC: 9.9 G/DL — LOW (ref 13–17)
HYPOCHROMIA BLD QL: SLIGHT — SIGNIFICANT CHANGE UP
IANC: 5.52 K/UL — SIGNIFICANT CHANGE UP (ref 1.8–7.4)
IANC: 9.47 K/UL — HIGH (ref 1.8–7.4)
IGA FLD-MCNC: 270 MG/DL — SIGNIFICANT CHANGE UP (ref 84–499)
IGG FLD-MCNC: 806 MG/DL — SIGNIFICANT CHANGE UP (ref 610–1660)
IGG SERPL-MCNC: 760 MG/DL — SIGNIFICANT CHANGE UP (ref 603–1613)
IGG1 SER-MCNC: 449 MG/DL — SIGNIFICANT CHANGE UP (ref 248–810)
IGG2 SER-MCNC: 154 MG/DL — SIGNIFICANT CHANGE UP (ref 130–555)
IGG3 SER-MCNC: 69 MG/DL — SIGNIFICANT CHANGE UP (ref 15–102)
IGG4 SER-MCNC: 77 MG/DL — SIGNIFICANT CHANGE UP (ref 2–96)
IGM SERPL-MCNC: 58 MG/DL — SIGNIFICANT CHANGE UP (ref 35–242)
IMM GRANULOCYTES NFR BLD AUTO: 0.6 % — SIGNIFICANT CHANGE UP (ref 0–0.9)
IMM GRANULOCYTES NFR BLD AUTO: 0.6 % — SIGNIFICANT CHANGE UP (ref 0–0.9)
IMM GRANULOCYTES NFR BLD AUTO: 0.7 % — SIGNIFICANT CHANGE UP (ref 0–0.9)
IMM GRANULOCYTES NFR BLD AUTO: 0.8 % — SIGNIFICANT CHANGE UP (ref 0–0.9)
IMM GRANULOCYTES NFR BLD AUTO: 0.9 % — SIGNIFICANT CHANGE UP (ref 0–0.9)
IMM GRANULOCYTES NFR BLD AUTO: 1 % — HIGH (ref 0–0.9)
IMM GRANULOCYTES NFR BLD AUTO: 1.1 % — HIGH (ref 0–0.9)
IMM GRANULOCYTES NFR BLD AUTO: 1.3 % — HIGH (ref 0–0.9)
IMM GRANULOCYTES NFR BLD AUTO: 1.6 % — HIGH (ref 0–0.9)
INR BLD: 0.98 RATIO — SIGNIFICANT CHANGE UP (ref 0.88–1.16)
INTERPRETATION SERPL IFE-IMP: SIGNIFICANT CHANGE UP
IRON SATN MFR SERPL: 61 UG/DL — SIGNIFICANT CHANGE UP (ref 45–165)
KAPPA LC SER QL IFE: 13.31 MG/DL — HIGH (ref 0.33–1.94)
KAPPA/LAMBDA FREE LIGHT CHAIN RATIO, SERUM: 1.03 RATIO — SIGNIFICANT CHANGE UP (ref 0.26–1.65)
KETONES UR-MCNC: NEGATIVE — SIGNIFICANT CHANGE UP
LACTATE SERPL-SCNC: 2.4 MMOL/L — HIGH (ref 0.7–2)
LAMBDA LC SER QL IFE: 12.94 MG/DL — HIGH (ref 0.57–2.63)
LEGIONELLA AG UR QL: NEGATIVE — SIGNIFICANT CHANGE UP
LEUKOCYTE ESTERASE UR-ACNC: NEGATIVE — SIGNIFICANT CHANGE UP
LIDOCAIN IGE QN: 11 U/L — LOW (ref 73–393)
LYMPHOCYTES # BLD AUTO: 0.23 K/UL — LOW (ref 1–3.3)
LYMPHOCYTES # BLD AUTO: 0.55 K/UL — LOW (ref 1–3.3)
LYMPHOCYTES # BLD AUTO: 0.57 K/UL — LOW (ref 1–3.3)
LYMPHOCYTES # BLD AUTO: 0.65 K/UL — LOW (ref 1–3.3)
LYMPHOCYTES # BLD AUTO: 0.66 K/UL — LOW (ref 1–3.3)
LYMPHOCYTES # BLD AUTO: 0.69 K/UL — LOW (ref 1–3.3)
LYMPHOCYTES # BLD AUTO: 0.72 K/UL — LOW (ref 1–3.3)
LYMPHOCYTES # BLD AUTO: 0.77 K/UL — LOW (ref 1–3.3)
LYMPHOCYTES # BLD AUTO: 0.78 K/UL — LOW (ref 1–3.3)
LYMPHOCYTES # BLD AUTO: 1 % — LOW (ref 13–44)
LYMPHOCYTES # BLD AUTO: 1.08 K/UL — SIGNIFICANT CHANGE UP (ref 1–3.3)
LYMPHOCYTES # BLD AUTO: 1.2 K/UL — SIGNIFICANT CHANGE UP (ref 1–3.3)
LYMPHOCYTES # BLD AUTO: 1.24 K/UL — SIGNIFICANT CHANGE UP (ref 1–3.3)
LYMPHOCYTES # BLD AUTO: 10.9 % — LOW (ref 13–44)
LYMPHOCYTES # BLD AUTO: 13.3 % — SIGNIFICANT CHANGE UP (ref 13–44)
LYMPHOCYTES # BLD AUTO: 14.9 % — SIGNIFICANT CHANGE UP (ref 13–44)
LYMPHOCYTES # BLD AUTO: 2.4 % — LOW (ref 13–44)
LYMPHOCYTES # BLD AUTO: 2.8 % — LOW (ref 13–44)
LYMPHOCYTES # BLD AUTO: 4 % — LOW (ref 13–44)
LYMPHOCYTES # BLD AUTO: 8.8 % — LOW (ref 13–44)
LYMPHOCYTES # BLD AUTO: 9.3 % — LOW (ref 13–44)
LYMPHOCYTES # BLD AUTO: 9.5 % — LOW (ref 13–44)
LYMPHOCYTES # BLD AUTO: 9.9 % — LOW (ref 13–44)
LYMPHOCYTES # BLD AUTO: 9.9 % — LOW (ref 13–44)
M PROTEIN 24H UR ELPH-MRATE: SIGNIFICANT CHANGE UP
M TB IFN-G BLD-IMP: ABNORMAL
M TB IFN-G CD4+ BCKGRND COR BLD-ACNC: 0 IU/ML — SIGNIFICANT CHANGE UP
M TB IFN-G CD4+CD8+ BCKGRND COR BLD-ACNC: -0.01 IU/ML — SIGNIFICANT CHANGE UP
MACROCYTES BLD QL: SLIGHT — SIGNIFICANT CHANGE UP
MAGNESIUM SERPL-MCNC: 1.3 MG/DL — LOW (ref 1.6–2.6)
MAGNESIUM SERPL-MCNC: 1.4 MG/DL — LOW (ref 1.6–2.6)
MAGNESIUM SERPL-MCNC: 1.4 MG/DL — LOW (ref 1.6–2.6)
MAGNESIUM SERPL-MCNC: 1.6 MG/DL — SIGNIFICANT CHANGE UP (ref 1.6–2.6)
MAGNESIUM SERPL-MCNC: 1.7 MG/DL — SIGNIFICANT CHANGE UP (ref 1.6–2.6)
MAGNESIUM SERPL-MCNC: 1.8 MG/DL — SIGNIFICANT CHANGE UP (ref 1.6–2.6)
MAGNESIUM SERPL-MCNC: 1.8 MG/DL — SIGNIFICANT CHANGE UP (ref 1.6–2.6)
MAGNESIUM SERPL-MCNC: 1.9 MG/DL — SIGNIFICANT CHANGE UP (ref 1.6–2.6)
MAGNESIUM SERPL-MCNC: 2 MG/DL — SIGNIFICANT CHANGE UP (ref 1.6–2.6)
MAGNESIUM SERPL-MCNC: 2 MG/DL — SIGNIFICANT CHANGE UP (ref 1.6–2.6)
MAGNESIUM SERPL-MCNC: 2.1 MG/DL — SIGNIFICANT CHANGE UP (ref 1.6–2.6)
MAGNESIUM SERPL-MCNC: 2.2 MG/DL — SIGNIFICANT CHANGE UP (ref 1.6–2.6)
MAGNESIUM SERPL-MCNC: 2.2 MG/DL — SIGNIFICANT CHANGE UP (ref 1.6–2.6)
MANUAL SMEAR VERIFICATION: SIGNIFICANT CHANGE UP
MANUAL SMEAR VERIFICATION: SIGNIFICANT CHANGE UP
MCHC RBC-ENTMCNC: 29.4 PG — SIGNIFICANT CHANGE UP (ref 27–34)
MCHC RBC-ENTMCNC: 29.4 PG — SIGNIFICANT CHANGE UP (ref 27–34)
MCHC RBC-ENTMCNC: 29.5 PG — SIGNIFICANT CHANGE UP (ref 27–34)
MCHC RBC-ENTMCNC: 29.6 PG — SIGNIFICANT CHANGE UP (ref 27–34)
MCHC RBC-ENTMCNC: 29.6 PG — SIGNIFICANT CHANGE UP (ref 27–34)
MCHC RBC-ENTMCNC: 29.7 PG — SIGNIFICANT CHANGE UP (ref 27–34)
MCHC RBC-ENTMCNC: 29.9 G/DL — LOW (ref 32–36)
MCHC RBC-ENTMCNC: 29.9 PG — SIGNIFICANT CHANGE UP (ref 27–34)
MCHC RBC-ENTMCNC: 29.9 PG — SIGNIFICANT CHANGE UP (ref 27–34)
MCHC RBC-ENTMCNC: 30 PG — SIGNIFICANT CHANGE UP (ref 27–34)
MCHC RBC-ENTMCNC: 30 PG — SIGNIFICANT CHANGE UP (ref 27–34)
MCHC RBC-ENTMCNC: 30.1 PG — SIGNIFICANT CHANGE UP (ref 27–34)
MCHC RBC-ENTMCNC: 30.2 PG — SIGNIFICANT CHANGE UP (ref 27–34)
MCHC RBC-ENTMCNC: 30.2 PG — SIGNIFICANT CHANGE UP (ref 27–34)
MCHC RBC-ENTMCNC: 30.3 PG — SIGNIFICANT CHANGE UP (ref 27–34)
MCHC RBC-ENTMCNC: 30.3 PG — SIGNIFICANT CHANGE UP (ref 27–34)
MCHC RBC-ENTMCNC: 30.4 PG — SIGNIFICANT CHANGE UP (ref 27–34)
MCHC RBC-ENTMCNC: 30.5 PG — SIGNIFICANT CHANGE UP (ref 27–34)
MCHC RBC-ENTMCNC: 30.6 G/DL — LOW (ref 32–36)
MCHC RBC-ENTMCNC: 30.7 PG — SIGNIFICANT CHANGE UP (ref 27–34)
MCHC RBC-ENTMCNC: 30.7 PG — SIGNIFICANT CHANGE UP (ref 27–34)
MCHC RBC-ENTMCNC: 31 G/DL — LOW (ref 32–36)
MCHC RBC-ENTMCNC: 31.1 G/DL — LOW (ref 32–36)
MCHC RBC-ENTMCNC: 31.1 G/DL — LOW (ref 32–36)
MCHC RBC-ENTMCNC: 31.1 GM/DL — LOW (ref 32–36)
MCHC RBC-ENTMCNC: 31.2 G/DL — LOW (ref 32–36)
MCHC RBC-ENTMCNC: 31.2 PG — SIGNIFICANT CHANGE UP (ref 27–34)
MCHC RBC-ENTMCNC: 31.3 GM/DL — LOW (ref 32–36)
MCHC RBC-ENTMCNC: 31.4 GM/DL — LOW (ref 32–36)
MCHC RBC-ENTMCNC: 31.4 GM/DL — LOW (ref 32–36)
MCHC RBC-ENTMCNC: 31.7 G/DL — LOW (ref 32–36)
MCHC RBC-ENTMCNC: 31.7 GM/DL — LOW (ref 32–36)
MCHC RBC-ENTMCNC: 31.8 GM/DL — LOW (ref 32–36)
MCHC RBC-ENTMCNC: 31.9 G/DL — LOW (ref 32–36)
MCHC RBC-ENTMCNC: 31.9 GM/DL — LOW (ref 32–36)
MCHC RBC-ENTMCNC: 32.1 G/DL — SIGNIFICANT CHANGE UP (ref 32–36)
MCHC RBC-ENTMCNC: 32.3 GM/DL — SIGNIFICANT CHANGE UP (ref 32–36)
MCHC RBC-ENTMCNC: 32.4 G/DL — SIGNIFICANT CHANGE UP (ref 32–36)
MCHC RBC-ENTMCNC: 32.4 GM/DL — SIGNIFICANT CHANGE UP (ref 32–36)
MCHC RBC-ENTMCNC: 32.8 GM/DL — SIGNIFICANT CHANGE UP (ref 32–36)
MCHC RBC-ENTMCNC: 33.2 G/DL — SIGNIFICANT CHANGE UP (ref 32–36)
MCHC RBC-ENTMCNC: 33.2 GM/DL — SIGNIFICANT CHANGE UP (ref 32–36)
MCHC RBC-ENTMCNC: 34.4 G/DL — SIGNIFICANT CHANGE UP (ref 32–36)
MCHC RBC-ENTMCNC: 34.4 G/DL — SIGNIFICANT CHANGE UP (ref 32–36)
MCHC RBC-ENTMCNC: 35.8 G/DL — SIGNIFICANT CHANGE UP (ref 32–36)
MCHC RBC-ENTMCNC: 36.7 G/DL — HIGH (ref 32–36)
MCHC RBC-ENTMCNC: 37.3 G/DL — HIGH (ref 32–36)
MCV RBC AUTO: 100.8 FL — HIGH (ref 80–100)
MCV RBC AUTO: 81.2 FL — SIGNIFICANT CHANGE UP (ref 80–100)
MCV RBC AUTO: 82.8 FL — SIGNIFICANT CHANGE UP (ref 80–100)
MCV RBC AUTO: 84.8 FL — SIGNIFICANT CHANGE UP (ref 80–100)
MCV RBC AUTO: 87.5 FL — SIGNIFICANT CHANGE UP (ref 80–100)
MCV RBC AUTO: 90.3 FL — SIGNIFICANT CHANGE UP (ref 80–100)
MCV RBC AUTO: 90.6 FL — SIGNIFICANT CHANGE UP (ref 80–100)
MCV RBC AUTO: 91.4 FL — SIGNIFICANT CHANGE UP (ref 80–100)
MCV RBC AUTO: 91.4 FL — SIGNIFICANT CHANGE UP (ref 80–100)
MCV RBC AUTO: 91.7 FL — SIGNIFICANT CHANGE UP (ref 80–100)
MCV RBC AUTO: 92.1 FL — SIGNIFICANT CHANGE UP (ref 80–100)
MCV RBC AUTO: 92.5 FL — SIGNIFICANT CHANGE UP (ref 80–100)
MCV RBC AUTO: 92.6 FL — SIGNIFICANT CHANGE UP (ref 80–100)
MCV RBC AUTO: 93.4 FL — SIGNIFICANT CHANGE UP (ref 80–100)
MCV RBC AUTO: 93.4 FL — SIGNIFICANT CHANGE UP (ref 80–100)
MCV RBC AUTO: 93.7 FL — SIGNIFICANT CHANGE UP (ref 80–100)
MCV RBC AUTO: 93.9 FL — SIGNIFICANT CHANGE UP (ref 80–100)
MCV RBC AUTO: 94 FL — SIGNIFICANT CHANGE UP (ref 80–100)
MCV RBC AUTO: 94.2 FL — SIGNIFICANT CHANGE UP (ref 80–100)
MCV RBC AUTO: 94.8 FL — SIGNIFICANT CHANGE UP (ref 80–100)
MCV RBC AUTO: 94.8 FL — SIGNIFICANT CHANGE UP (ref 80–100)
MCV RBC AUTO: 95 FL — SIGNIFICANT CHANGE UP (ref 80–100)
MCV RBC AUTO: 95.6 FL — SIGNIFICANT CHANGE UP (ref 80–100)
MCV RBC AUTO: 95.7 FL — SIGNIFICANT CHANGE UP (ref 80–100)
MCV RBC AUTO: 96 FL — SIGNIFICANT CHANGE UP (ref 80–100)
MCV RBC AUTO: 96.6 FL — SIGNIFICANT CHANGE UP (ref 80–100)
MCV RBC AUTO: 97.5 FL — SIGNIFICANT CHANGE UP (ref 80–100)
MCV RBC AUTO: 97.6 FL — SIGNIFICANT CHANGE UP (ref 80–100)
MCV RBC AUTO: 98.3 FL — SIGNIFICANT CHANGE UP (ref 80–100)
MCV RBC AUTO: 98.5 FL — SIGNIFICANT CHANGE UP (ref 80–100)
METHOD TYPE: SIGNIFICANT CHANGE UP
MONOCYTES # BLD AUTO: 0.26 K/UL — SIGNIFICANT CHANGE UP (ref 0–0.9)
MONOCYTES # BLD AUTO: 0.31 K/UL — SIGNIFICANT CHANGE UP (ref 0–0.9)
MONOCYTES # BLD AUTO: 0.32 K/UL — SIGNIFICANT CHANGE UP (ref 0–0.9)
MONOCYTES # BLD AUTO: 0.34 K/UL — SIGNIFICANT CHANGE UP (ref 0–0.9)
MONOCYTES # BLD AUTO: 0.35 K/UL — SIGNIFICANT CHANGE UP (ref 0–0.9)
MONOCYTES # BLD AUTO: 0.36 K/UL — SIGNIFICANT CHANGE UP (ref 0–0.9)
MONOCYTES # BLD AUTO: 0.4 K/UL — SIGNIFICANT CHANGE UP (ref 0–0.9)
MONOCYTES # BLD AUTO: 0.47 K/UL — SIGNIFICANT CHANGE UP (ref 0–0.9)
MONOCYTES # BLD AUTO: 0.52 K/UL — SIGNIFICANT CHANGE UP (ref 0–0.9)
MONOCYTES # BLD AUTO: 0.52 K/UL — SIGNIFICANT CHANGE UP (ref 0–0.9)
MONOCYTES # BLD AUTO: 0.53 K/UL — SIGNIFICANT CHANGE UP (ref 0–0.9)
MONOCYTES # BLD AUTO: 0.68 K/UL — SIGNIFICANT CHANGE UP (ref 0–0.9)
MONOCYTES NFR BLD AUTO: 1.5 % — LOW (ref 2–14)
MONOCYTES NFR BLD AUTO: 1.5 % — LOW (ref 2–14)
MONOCYTES NFR BLD AUTO: 1.8 % — LOW (ref 2–14)
MONOCYTES NFR BLD AUTO: 3 % — SIGNIFICANT CHANGE UP (ref 2–14)
MONOCYTES NFR BLD AUTO: 3.9 % — SIGNIFICANT CHANGE UP (ref 2–14)
MONOCYTES NFR BLD AUTO: 4.6 % — SIGNIFICANT CHANGE UP (ref 2–14)
MONOCYTES NFR BLD AUTO: 4.7 % — SIGNIFICANT CHANGE UP (ref 2–14)
MONOCYTES NFR BLD AUTO: 4.8 % — SIGNIFICANT CHANGE UP (ref 2–14)
MONOCYTES NFR BLD AUTO: 5.1 % — SIGNIFICANT CHANGE UP (ref 2–14)
MONOCYTES NFR BLD AUTO: 5.6 % — SIGNIFICANT CHANGE UP (ref 2–14)
MONOCYTES NFR BLD AUTO: 5.9 % — SIGNIFICANT CHANGE UP (ref 2–14)
MONOCYTES NFR BLD AUTO: 7.2 % — SIGNIFICANT CHANGE UP (ref 2–14)
NEUTROPHILS # BLD AUTO: 13.1 K/UL — HIGH (ref 1.8–7.4)
NEUTROPHILS # BLD AUTO: 21.57 K/UL — HIGH (ref 1.8–7.4)
NEUTROPHILS # BLD AUTO: 21.87 K/UL — HIGH (ref 1.8–7.4)
NEUTROPHILS # BLD AUTO: 22.08 K/UL — HIGH (ref 1.8–7.4)
NEUTROPHILS # BLD AUTO: 5.46 K/UL — SIGNIFICANT CHANGE UP (ref 1.8–7.4)
NEUTROPHILS # BLD AUTO: 5.52 K/UL — SIGNIFICANT CHANGE UP (ref 1.8–7.4)
NEUTROPHILS # BLD AUTO: 6.07 K/UL — SIGNIFICANT CHANGE UP (ref 1.8–7.4)
NEUTROPHILS # BLD AUTO: 6.54 K/UL — SIGNIFICANT CHANGE UP (ref 1.8–7.4)
NEUTROPHILS # BLD AUTO: 6.97 K/UL — SIGNIFICANT CHANGE UP (ref 1.8–7.4)
NEUTROPHILS # BLD AUTO: 6.98 K/UL — SIGNIFICANT CHANGE UP (ref 1.8–7.4)
NEUTROPHILS # BLD AUTO: 7.04 K/UL — SIGNIFICANT CHANGE UP (ref 1.8–7.4)
NEUTROPHILS # BLD AUTO: 9.47 K/UL — HIGH (ref 1.8–7.4)
NEUTROPHILS NFR BLD AUTO: 75.9 % — SIGNIFICANT CHANGE UP (ref 43–77)
NEUTROPHILS NFR BLD AUTO: 78.1 % — HIGH (ref 43–77)
NEUTROPHILS NFR BLD AUTO: 82.8 % — HIGH (ref 43–77)
NEUTROPHILS NFR BLD AUTO: 82.8 % — HIGH (ref 43–77)
NEUTROPHILS NFR BLD AUTO: 83 % — HIGH (ref 43–77)
NEUTROPHILS NFR BLD AUTO: 83.5 % — HIGH (ref 43–77)
NEUTROPHILS NFR BLD AUTO: 83.7 % — HIGH (ref 43–77)
NEUTROPHILS NFR BLD AUTO: 83.9 % — HIGH (ref 43–77)
NEUTROPHILS NFR BLD AUTO: 85.6 % — HIGH (ref 43–77)
NEUTROPHILS NFR BLD AUTO: 92.8 % — HIGH (ref 43–77)
NEUTROPHILS NFR BLD AUTO: 94.4 % — HIGH (ref 43–77)
NEUTROPHILS NFR BLD AUTO: 95 % — HIGH (ref 43–77)
NEUTS BAND # BLD: 13 % — HIGH (ref 0–8)
NIGHT BLUE STAIN TISS: SIGNIFICANT CHANGE UP
NITRITE UR-MCNC: NEGATIVE — SIGNIFICANT CHANGE UP
NRBC # BLD: 0 /100 WBCS — SIGNIFICANT CHANGE UP (ref 0–0)
NRBC # BLD: 1 /100 WBCS — HIGH (ref 0–0)
NRBC # BLD: 1 /100 WBCS — HIGH (ref 0–0)
NRBC # BLD: 1 /100 — HIGH (ref 0–0)
NRBC # BLD: 2 /100 WBCS — HIGH (ref 0–0)
NRBC # BLD: 2 /100 WBCS — HIGH (ref 0–0)
NRBC # BLD: SIGNIFICANT CHANGE UP /100 WBCS (ref 0–0)
NRBC # FLD: 0 K/UL — SIGNIFICANT CHANGE UP (ref 0–0)
NRBC # FLD: 0.03 K/UL — HIGH (ref 0–0)
NRBC # FLD: 0.03 K/UL — HIGH (ref 0–0)
OB PNL STL: POSITIVE
ORGANISM # SPEC MICROSCOPIC CNT: SIGNIFICANT CHANGE UP
PH UR: 6 — SIGNIFICANT CHANGE UP (ref 5–8)
PHOSPHATE SERPL-MCNC: 3.7 MG/DL — SIGNIFICANT CHANGE UP (ref 2.5–4.5)
PHOSPHATE SERPL-MCNC: 4.1 MG/DL — SIGNIFICANT CHANGE UP (ref 2.5–4.5)
PHOSPHATE SERPL-MCNC: 4.1 MG/DL — SIGNIFICANT CHANGE UP (ref 2.5–4.5)
PHOSPHATE SERPL-MCNC: 4.4 MG/DL — SIGNIFICANT CHANGE UP (ref 2.5–4.5)
PHOSPHATE SERPL-MCNC: 4.7 MG/DL — HIGH (ref 2.5–4.5)
PHOSPHATE SERPL-MCNC: 5.2 MG/DL — HIGH (ref 2.5–4.5)
PHOSPHATE SERPL-MCNC: 5.3 MG/DL — HIGH (ref 2.5–4.5)
PHOSPHATE SERPL-MCNC: 5.4 MG/DL — HIGH (ref 2.5–4.5)
PHOSPHATE SERPL-MCNC: 5.5 MG/DL — HIGH (ref 2.5–4.5)
PHOSPHATE SERPL-MCNC: 5.6 MG/DL — HIGH (ref 2.5–4.5)
PHOSPHATE SERPL-MCNC: 5.7 MG/DL — HIGH (ref 2.5–4.5)
PHOSPHATE SERPL-MCNC: 6.1 MG/DL — HIGH (ref 2.5–4.5)
PHOSPHATE SERPL-MCNC: 6.3 MG/DL — HIGH (ref 2.5–4.5)
PHOSPHATE SERPL-MCNC: 7 MG/DL — HIGH (ref 2.5–4.5)
PHOSPHATE SERPL-MCNC: 7.3 MG/DL — HIGH (ref 2.5–4.5)
PHOSPHATE SERPL-MCNC: 7.4 MG/DL — HIGH (ref 2.5–4.5)
PHOSPHATE SERPL-MCNC: 7.8 MG/DL — HIGH (ref 2.5–4.5)
PLAT MORPH BLD: NORMAL — SIGNIFICANT CHANGE UP
PLAT MORPH BLD: NORMAL — SIGNIFICANT CHANGE UP
PLATELET # BLD AUTO: 106 K/UL — LOW (ref 150–400)
PLATELET # BLD AUTO: 136 K/UL — LOW (ref 150–400)
PLATELET # BLD AUTO: 150 K/UL — SIGNIFICANT CHANGE UP (ref 150–400)
PLATELET # BLD AUTO: 174 K/UL — SIGNIFICANT CHANGE UP (ref 150–400)
PLATELET # BLD AUTO: 179 K/UL — SIGNIFICANT CHANGE UP (ref 150–400)
PLATELET # BLD AUTO: 188 K/UL — SIGNIFICANT CHANGE UP (ref 150–400)
PLATELET # BLD AUTO: 208 K/UL — SIGNIFICANT CHANGE UP (ref 150–400)
PLATELET # BLD AUTO: 210 K/UL — SIGNIFICANT CHANGE UP (ref 150–400)
PLATELET # BLD AUTO: 217 K/UL — SIGNIFICANT CHANGE UP (ref 150–400)
PLATELET # BLD AUTO: 224 K/UL — SIGNIFICANT CHANGE UP (ref 150–400)
PLATELET # BLD AUTO: 235 K/UL — SIGNIFICANT CHANGE UP (ref 150–400)
PLATELET # BLD AUTO: 236 K/UL — SIGNIFICANT CHANGE UP (ref 150–400)
PLATELET # BLD AUTO: 241 K/UL — SIGNIFICANT CHANGE UP (ref 150–400)
PLATELET # BLD AUTO: 241 K/UL — SIGNIFICANT CHANGE UP (ref 150–400)
PLATELET # BLD AUTO: 246 K/UL — SIGNIFICANT CHANGE UP (ref 150–400)
PLATELET # BLD AUTO: 251 K/UL — SIGNIFICANT CHANGE UP (ref 150–400)
PLATELET # BLD AUTO: 252 K/UL — SIGNIFICANT CHANGE UP (ref 150–400)
PLATELET # BLD AUTO: 254 K/UL — SIGNIFICANT CHANGE UP (ref 150–400)
PLATELET # BLD AUTO: 255 K/UL — SIGNIFICANT CHANGE UP (ref 150–400)
PLATELET # BLD AUTO: 256 K/UL — SIGNIFICANT CHANGE UP (ref 150–400)
PLATELET # BLD AUTO: 263 K/UL — SIGNIFICANT CHANGE UP (ref 150–400)
PLATELET # BLD AUTO: 282 K/UL — SIGNIFICANT CHANGE UP (ref 150–400)
PLATELET # BLD AUTO: 33 K/UL — LOW (ref 150–400)
PLATELET # BLD AUTO: 38 K/UL — LOW (ref 150–400)
PLATELET # BLD AUTO: 38 K/UL — LOW (ref 150–400)
PLATELET # BLD AUTO: 39 K/UL — LOW (ref 150–400)
PLATELET # BLD AUTO: 48 K/UL — LOW (ref 150–400)
PLATELET # BLD AUTO: 51 K/UL — LOW (ref 150–400)
PLATELET # BLD AUTO: 55 K/UL — LOW (ref 150–400)
PLATELET # BLD AUTO: 61 K/UL — LOW (ref 150–400)
POIKILOCYTOSIS BLD QL AUTO: SLIGHT — SIGNIFICANT CHANGE UP
POIKILOCYTOSIS BLD QL AUTO: SLIGHT — SIGNIFICANT CHANGE UP
POTASSIUM SERPL-MCNC: 2.6 MMOL/L — CRITICAL LOW (ref 3.5–5.3)
POTASSIUM SERPL-MCNC: 2.9 MMOL/L — CRITICAL LOW (ref 3.5–5.3)
POTASSIUM SERPL-MCNC: 3 MMOL/L — LOW (ref 3.5–5.3)
POTASSIUM SERPL-MCNC: 3 MMOL/L — LOW (ref 3.5–5.3)
POTASSIUM SERPL-MCNC: 3.1 MMOL/L — LOW (ref 3.5–5.3)
POTASSIUM SERPL-MCNC: 3.2 MMOL/L — LOW (ref 3.5–5.3)
POTASSIUM SERPL-MCNC: 3.3 MMOL/L — LOW (ref 3.5–5.3)
POTASSIUM SERPL-MCNC: 3.4 MMOL/L — LOW (ref 3.5–5.3)
POTASSIUM SERPL-MCNC: 3.4 MMOL/L — LOW (ref 3.5–5.3)
POTASSIUM SERPL-MCNC: 3.5 MMOL/L — SIGNIFICANT CHANGE UP (ref 3.5–5.3)
POTASSIUM SERPL-MCNC: 3.6 MMOL/L — SIGNIFICANT CHANGE UP (ref 3.5–5.3)
POTASSIUM SERPL-MCNC: 3.7 MMOL/L — SIGNIFICANT CHANGE UP (ref 3.5–5.3)
POTASSIUM SERPL-MCNC: 3.8 MMOL/L — SIGNIFICANT CHANGE UP (ref 3.5–5.3)
POTASSIUM SERPL-MCNC: 3.8 MMOL/L — SIGNIFICANT CHANGE UP (ref 3.5–5.3)
POTASSIUM SERPL-MCNC: 3.9 MMOL/L — SIGNIFICANT CHANGE UP (ref 3.5–5.3)
POTASSIUM SERPL-MCNC: 4.1 MMOL/L — SIGNIFICANT CHANGE UP (ref 3.5–5.3)
POTASSIUM SERPL-MCNC: 4.4 MMOL/L — SIGNIFICANT CHANGE UP (ref 3.5–5.3)
POTASSIUM SERPL-MCNC: 4.6 MMOL/L — SIGNIFICANT CHANGE UP (ref 3.5–5.3)
POTASSIUM SERPL-MCNC: 5.8 MMOL/L — HIGH (ref 3.5–5.3)
POTASSIUM SERPL-SCNC: 2.6 MMOL/L — CRITICAL LOW (ref 3.5–5.3)
POTASSIUM SERPL-SCNC: 2.9 MMOL/L — CRITICAL LOW (ref 3.5–5.3)
POTASSIUM SERPL-SCNC: 3 MMOL/L — LOW (ref 3.5–5.3)
POTASSIUM SERPL-SCNC: 3 MMOL/L — LOW (ref 3.5–5.3)
POTASSIUM SERPL-SCNC: 3.1 MMOL/L — LOW (ref 3.5–5.3)
POTASSIUM SERPL-SCNC: 3.2 MMOL/L — LOW (ref 3.5–5.3)
POTASSIUM SERPL-SCNC: 3.3 MMOL/L — LOW (ref 3.5–5.3)
POTASSIUM SERPL-SCNC: 3.4 MMOL/L — LOW (ref 3.5–5.3)
POTASSIUM SERPL-SCNC: 3.4 MMOL/L — LOW (ref 3.5–5.3)
POTASSIUM SERPL-SCNC: 3.5 MMOL/L — SIGNIFICANT CHANGE UP (ref 3.5–5.3)
POTASSIUM SERPL-SCNC: 3.6 MMOL/L — SIGNIFICANT CHANGE UP (ref 3.5–5.3)
POTASSIUM SERPL-SCNC: 3.7 MMOL/L — SIGNIFICANT CHANGE UP (ref 3.5–5.3)
POTASSIUM SERPL-SCNC: 3.8 MMOL/L — SIGNIFICANT CHANGE UP (ref 3.5–5.3)
POTASSIUM SERPL-SCNC: 3.8 MMOL/L — SIGNIFICANT CHANGE UP (ref 3.5–5.3)
POTASSIUM SERPL-SCNC: 3.9 MMOL/L — SIGNIFICANT CHANGE UP (ref 3.5–5.3)
POTASSIUM SERPL-SCNC: 4.1 MMOL/L — SIGNIFICANT CHANGE UP (ref 3.5–5.3)
POTASSIUM SERPL-SCNC: 4.4 MMOL/L — SIGNIFICANT CHANGE UP (ref 3.5–5.3)
POTASSIUM SERPL-SCNC: 4.6 MMOL/L — SIGNIFICANT CHANGE UP (ref 3.5–5.3)
POTASSIUM SERPL-SCNC: 5.8 MMOL/L — HIGH (ref 3.5–5.3)
PROT ?TM UR-MCNC: 34 MG/DL — SIGNIFICANT CHANGE UP
PROT ?TM UR-MCNC: 48 MG/DL — HIGH (ref 0–12)
PROT PATTERN SERPL ELPH-IMP: SIGNIFICANT CHANGE UP
PROT SERPL-MCNC: 4.3 GM/DL — LOW (ref 6–8.3)
PROT SERPL-MCNC: 4.4 GM/DL — LOW (ref 6–8.3)
PROT SERPL-MCNC: 4.6 G/DL — LOW (ref 6–8.3)
PROT SERPL-MCNC: 4.6 GM/DL — LOW (ref 6–8.3)
PROT SERPL-MCNC: 4.6 GM/DL — LOW (ref 6–8.3)
PROT SERPL-MCNC: 4.7 GM/DL — LOW (ref 6–8.3)
PROT SERPL-MCNC: 4.8 G/DL — LOW (ref 6–8.3)
PROT SERPL-MCNC: 4.8 G/DL — LOW (ref 6–8.3)
PROT SERPL-MCNC: 4.9 G/DL — LOW (ref 6–8.3)
PROT SERPL-MCNC: 4.9 G/DL — LOW (ref 6–8.3)
PROT SERPL-MCNC: 4.9 GM/DL — LOW (ref 6–8.3)
PROT SERPL-MCNC: 4.9 GM/DL — LOW (ref 6–8.3)
PROT SERPL-MCNC: 5 G/DL — LOW (ref 6–8.3)
PROT SERPL-MCNC: 5 GM/DL — LOW (ref 6–8.3)
PROT SERPL-MCNC: 5 GM/DL — LOW (ref 6–8.3)
PROT SERPL-MCNC: 5.2 G/DL — LOW (ref 6–8.3)
PROT SERPL-MCNC: 5.2 G/DL — LOW (ref 6–8.3)
PROT SERPL-MCNC: 5.2 GM/DL — LOW (ref 6–8.3)
PROT SERPL-MCNC: 5.3 G/DL — LOW (ref 6–8.3)
PROT SERPL-MCNC: 5.3 GM/DL — LOW (ref 6–8.3)
PROT SERPL-MCNC: 5.4 G/DL — LOW (ref 6–8.3)
PROT SERPL-MCNC: 5.4 GM/DL — LOW (ref 6–8.3)
PROT SERPL-MCNC: 5.4 GM/DL — LOW (ref 6–8.3)
PROT SERPL-MCNC: 5.6 GM/DL — LOW (ref 6–8.3)
PROT SERPL-MCNC: 5.7 G/DL — LOW (ref 6–8.3)
PROT UR-MCNC: ABNORMAL
PROT/CREAT UR-RTO: 0.7 RATIO — HIGH (ref 0–0.2)
PROTHROM AB SERPL-ACNC: 11.8 SEC — SIGNIFICANT CHANGE UP (ref 10.5–13.4)
PTH-INTACT FLD-MCNC: 290 PG/ML — HIGH (ref 15–65)
QUANT TB PLUS MITOGEN MINUS NIL: 0.04 IU/ML — SIGNIFICANT CHANGE UP
RBC # BLD: 2.03 M/UL — LOW (ref 4.2–5.8)
RBC # BLD: 2.31 M/UL — LOW (ref 4.2–5.8)
RBC # BLD: 2.36 M/UL — LOW (ref 4.2–5.8)
RBC # BLD: 2.39 M/UL — LOW (ref 4.2–5.8)
RBC # BLD: 2.4 M/UL — LOW (ref 4.2–5.8)
RBC # BLD: 2.5 M/UL — LOW (ref 4.2–5.8)
RBC # BLD: 2.5 M/UL — LOW (ref 4.2–5.8)
RBC # BLD: 2.51 M/UL — LOW (ref 4.2–5.8)
RBC # BLD: 2.51 M/UL — LOW (ref 4.2–5.8)
RBC # BLD: 2.54 M/UL — LOW (ref 4.2–5.8)
RBC # BLD: 2.59 M/UL — LOW (ref 4.2–5.8)
RBC # BLD: 2.6 M/UL — LOW (ref 4.2–5.8)
RBC # BLD: 2.66 M/UL — LOW (ref 4.2–5.8)
RBC # BLD: 2.76 M/UL — LOW (ref 4.2–5.8)
RBC # BLD: 2.79 M/UL — LOW (ref 4.2–5.8)
RBC # BLD: 2.81 M/UL — LOW (ref 4.2–5.8)
RBC # BLD: 3 M/UL — LOW (ref 4.2–5.8)
RBC # BLD: 3.01 M/UL — LOW (ref 4.2–5.8)
RBC # BLD: 3.03 M/UL — LOW (ref 4.2–5.8)
RBC # BLD: 3.03 M/UL — LOW (ref 4.2–5.8)
RBC # BLD: 3.06 M/UL — LOW (ref 4.2–5.8)
RBC # BLD: 3.09 M/UL — LOW (ref 4.2–5.8)
RBC # BLD: 3.09 M/UL — LOW (ref 4.2–5.8)
RBC # BLD: 3.13 M/UL — LOW (ref 4.2–5.8)
RBC # BLD: 3.24 M/UL — LOW (ref 4.2–5.8)
RBC # BLD: 3.24 M/UL — LOW (ref 4.2–5.8)
RBC # BLD: 3.3 M/UL — LOW (ref 4.2–5.8)
RBC # BLD: 3.31 M/UL — LOW (ref 4.2–5.8)
RBC # BLD: 3.32 M/UL — LOW (ref 4.2–5.8)
RBC # BLD: 3.35 M/UL — LOW (ref 4.2–5.8)
RBC # FLD: 13.5 % — SIGNIFICANT CHANGE UP (ref 10.3–14.5)
RBC # FLD: 13.7 % — SIGNIFICANT CHANGE UP (ref 10.3–14.5)
RBC # FLD: 14.6 % — HIGH (ref 10.3–14.5)
RBC # FLD: 14.7 % — HIGH (ref 10.3–14.5)
RBC # FLD: 14.8 % — HIGH (ref 10.3–14.5)
RBC # FLD: 14.8 % — HIGH (ref 10.3–14.5)
RBC # FLD: 15.1 % — HIGH (ref 10.3–14.5)
RBC # FLD: 15.2 % — HIGH (ref 10.3–14.5)
RBC # FLD: 15.5 % — HIGH (ref 10.3–14.5)
RBC # FLD: 15.6 % — HIGH (ref 10.3–14.5)
RBC # FLD: 16.2 % — HIGH (ref 10.3–14.5)
RBC # FLD: 16.6 % — HIGH (ref 10.3–14.5)
RBC # FLD: 16.8 % — HIGH (ref 10.3–14.5)
RBC # FLD: 16.8 % — HIGH (ref 10.3–14.5)
RBC # FLD: 17 % — HIGH (ref 10.3–14.5)
RBC # FLD: 17.2 % — HIGH (ref 10.3–14.5)
RBC # FLD: 17.3 % — HIGH (ref 10.3–14.5)
RBC # FLD: 17.4 % — HIGH (ref 10.3–14.5)
RBC # FLD: 17.6 % — HIGH (ref 10.3–14.5)
RBC # FLD: 17.6 % — HIGH (ref 10.3–14.5)
RBC # FLD: 17.7 % — HIGH (ref 10.3–14.5)
RBC # FLD: 17.8 % — HIGH (ref 10.3–14.5)
RBC # FLD: 17.8 % — HIGH (ref 10.3–14.5)
RBC # FLD: 18.1 % — HIGH (ref 10.3–14.5)
RBC # FLD: 18.6 % — HIGH (ref 10.3–14.5)
RBC # FLD: 18.9 % — HIGH (ref 10.3–14.5)
RBC BLD AUTO: ABNORMAL
RBC BLD AUTO: ABNORMAL
RBC CASTS # UR COMP ASSIST: 2 /HPF — SIGNIFICANT CHANGE UP (ref 0–4)
RENIN PLAS-CCNC: 0.19 NG/ML/HR — SIGNIFICANT CHANGE UP (ref 0.17–5.38)
RH IG SCN BLD-IMP: POSITIVE — SIGNIFICANT CHANGE UP
RH IG SCN BLD-IMP: POSITIVE — SIGNIFICANT CHANGE UP
ROULEAUX BLD QL SMEAR: PRESENT
SARS-COV-2 RNA SPEC QL NAA+PROBE: DETECTED
SARS-COV-2 RNA SPEC QL NAA+PROBE: DETECTED
SCHISTOCYTES BLD QL AUTO: SLIGHT — SIGNIFICANT CHANGE UP
SODIUM SERPL-SCNC: 137 MMOL/L — SIGNIFICANT CHANGE UP (ref 135–145)
SODIUM SERPL-SCNC: 139 MMOL/L — SIGNIFICANT CHANGE UP (ref 135–145)
SODIUM SERPL-SCNC: 140 MMOL/L — SIGNIFICANT CHANGE UP (ref 135–145)
SODIUM SERPL-SCNC: 141 MMOL/L — SIGNIFICANT CHANGE UP (ref 135–145)
SODIUM SERPL-SCNC: 141 MMOL/L — SIGNIFICANT CHANGE UP (ref 135–145)
SODIUM SERPL-SCNC: 142 MMOL/L — SIGNIFICANT CHANGE UP (ref 135–145)
SODIUM SERPL-SCNC: 142 MMOL/L — SIGNIFICANT CHANGE UP (ref 135–145)
SODIUM SERPL-SCNC: 143 MMOL/L — SIGNIFICANT CHANGE UP (ref 135–145)
SODIUM SERPL-SCNC: 144 MMOL/L — SIGNIFICANT CHANGE UP (ref 135–145)
SODIUM SERPL-SCNC: 145 MMOL/L — SIGNIFICANT CHANGE UP (ref 135–145)
SODIUM SERPL-SCNC: 146 MMOL/L — HIGH (ref 135–145)
SODIUM SERPL-SCNC: 147 MMOL/L — HIGH (ref 135–145)
SODIUM SERPL-SCNC: 148 MMOL/L — HIGH (ref 135–145)
SODIUM SERPL-SCNC: 149 MMOL/L — HIGH (ref 135–145)
SP GR SPEC: 1.01 — SIGNIFICANT CHANGE UP (ref 1.01–1.05)
SPECIMEN SOURCE: SIGNIFICANT CHANGE UP
TARGETS BLD QL SMEAR: SIGNIFICANT CHANGE UP
TARGETS BLD QL SMEAR: SLIGHT — SIGNIFICANT CHANGE UP
TOXIC GRANULES BLD QL SMEAR: PRESENT — SIGNIFICANT CHANGE UP
TROPONIN I, HIGH SENSITIVITY RESULT: 3402.4 NG/L — HIGH
TROPONIN I, HIGH SENSITIVITY RESULT: 8534.2 NG/L — HIGH
TROPONIN I, HIGH SENSITIVITY RESULT: 8570.4 NG/L — HIGH
TROPONIN I, HIGH SENSITIVITY RESULT: 8882.8 NG/L — HIGH
TROPONIN T, HIGH SENSITIVITY RESULT: 199 NG/L — CRITICAL HIGH
TROPONIN T, HIGH SENSITIVITY RESULT: 210 NG/L — CRITICAL HIGH
UROBILINOGEN FLD QL: SIGNIFICANT CHANGE UP
WBC # BLD: 11.05 K/UL — HIGH (ref 3.8–10.5)
WBC # BLD: 11.42 K/UL — HIGH (ref 3.8–10.5)
WBC # BLD: 11.47 K/UL — HIGH (ref 3.8–10.5)
WBC # BLD: 12.1 K/UL — HIGH (ref 3.8–10.5)
WBC # BLD: 12.25 K/UL — HIGH (ref 3.8–10.5)
WBC # BLD: 12.42 K/UL — HIGH (ref 3.8–10.5)
WBC # BLD: 12.64 K/UL — HIGH (ref 3.8–10.5)
WBC # BLD: 13.67 K/UL — HIGH (ref 3.8–10.5)
WBC # BLD: 14.08 K/UL — HIGH (ref 3.8–10.5)
WBC # BLD: 14.13 K/UL — HIGH (ref 3.8–10.5)
WBC # BLD: 14.14 K/UL — HIGH (ref 3.8–10.5)
WBC # BLD: 15.38 K/UL — HIGH (ref 3.8–10.5)
WBC # BLD: 16.37 K/UL — HIGH (ref 3.8–10.5)
WBC # BLD: 22.7 K/UL — HIGH (ref 3.8–10.5)
WBC # BLD: 22.78 K/UL — HIGH (ref 3.8–10.5)
WBC # BLD: 23.42 K/UL — HIGH (ref 3.8–10.5)
WBC # BLD: 25.9 K/UL — HIGH (ref 3.8–10.5)
WBC # BLD: 4.31 K/UL — SIGNIFICANT CHANGE UP (ref 3.8–10.5)
WBC # BLD: 4.57 K/UL — SIGNIFICANT CHANGE UP (ref 3.8–10.5)
WBC # BLD: 5.34 K/UL — SIGNIFICANT CHANGE UP (ref 3.8–10.5)
WBC # BLD: 6.09 K/UL — SIGNIFICANT CHANGE UP (ref 3.8–10.5)
WBC # BLD: 6.33 K/UL — SIGNIFICANT CHANGE UP (ref 3.8–10.5)
WBC # BLD: 6.59 K/UL — SIGNIFICANT CHANGE UP (ref 3.8–10.5)
WBC # BLD: 7.25 K/UL — SIGNIFICANT CHANGE UP (ref 3.8–10.5)
WBC # BLD: 7.26 K/UL — SIGNIFICANT CHANGE UP (ref 3.8–10.5)
WBC # BLD: 7.79 K/UL — SIGNIFICANT CHANGE UP (ref 3.8–10.5)
WBC # BLD: 8.15 K/UL — SIGNIFICANT CHANGE UP (ref 3.8–10.5)
WBC # BLD: 8.37 K/UL — SIGNIFICANT CHANGE UP (ref 3.8–10.5)
WBC # BLD: 9.02 K/UL — SIGNIFICANT CHANGE UP (ref 3.8–10.5)
WBC # BLD: 9.23 K/UL — SIGNIFICANT CHANGE UP (ref 3.8–10.5)
WBC # FLD AUTO: 11.05 K/UL — HIGH (ref 3.8–10.5)
WBC # FLD AUTO: 11.42 K/UL — HIGH (ref 3.8–10.5)
WBC # FLD AUTO: 11.47 K/UL — HIGH (ref 3.8–10.5)
WBC # FLD AUTO: 12.1 K/UL — HIGH (ref 3.8–10.5)
WBC # FLD AUTO: 12.25 K/UL — HIGH (ref 3.8–10.5)
WBC # FLD AUTO: 12.42 K/UL — HIGH (ref 3.8–10.5)
WBC # FLD AUTO: 12.64 K/UL — HIGH (ref 3.8–10.5)
WBC # FLD AUTO: 13.67 K/UL — HIGH (ref 3.8–10.5)
WBC # FLD AUTO: 14.08 K/UL — HIGH (ref 3.8–10.5)
WBC # FLD AUTO: 14.13 K/UL — HIGH (ref 3.8–10.5)
WBC # FLD AUTO: 14.14 K/UL — HIGH (ref 3.8–10.5)
WBC # FLD AUTO: 15.38 K/UL — HIGH (ref 3.8–10.5)
WBC # FLD AUTO: 16.37 K/UL — HIGH (ref 3.8–10.5)
WBC # FLD AUTO: 22.7 K/UL — HIGH (ref 3.8–10.5)
WBC # FLD AUTO: 22.78 K/UL — HIGH (ref 3.8–10.5)
WBC # FLD AUTO: 23.42 K/UL — HIGH (ref 3.8–10.5)
WBC # FLD AUTO: 25.9 K/UL — HIGH (ref 3.8–10.5)
WBC # FLD AUTO: 4.31 K/UL — SIGNIFICANT CHANGE UP (ref 3.8–10.5)
WBC # FLD AUTO: 4.57 K/UL — SIGNIFICANT CHANGE UP (ref 3.8–10.5)
WBC # FLD AUTO: 5.34 K/UL — SIGNIFICANT CHANGE UP (ref 3.8–10.5)
WBC # FLD AUTO: 6.09 K/UL — SIGNIFICANT CHANGE UP (ref 3.8–10.5)
WBC # FLD AUTO: 6.33 K/UL — SIGNIFICANT CHANGE UP (ref 3.8–10.5)
WBC # FLD AUTO: 6.59 K/UL — SIGNIFICANT CHANGE UP (ref 3.8–10.5)
WBC # FLD AUTO: 7.25 K/UL — SIGNIFICANT CHANGE UP (ref 3.8–10.5)
WBC # FLD AUTO: 7.26 K/UL — SIGNIFICANT CHANGE UP (ref 3.8–10.5)
WBC # FLD AUTO: 7.79 K/UL — SIGNIFICANT CHANGE UP (ref 3.8–10.5)
WBC # FLD AUTO: 8.15 K/UL — SIGNIFICANT CHANGE UP (ref 3.8–10.5)
WBC # FLD AUTO: 8.37 K/UL — SIGNIFICANT CHANGE UP (ref 3.8–10.5)
WBC # FLD AUTO: 9.02 K/UL — SIGNIFICANT CHANGE UP (ref 3.8–10.5)
WBC # FLD AUTO: 9.23 K/UL — SIGNIFICANT CHANGE UP (ref 3.8–10.5)
WBC UR QL: 1 /HPF — SIGNIFICANT CHANGE UP (ref 0–5)

## 2022-01-01 PROCEDURE — 99233 SBSQ HOSP IP/OBS HIGH 50: CPT | Mod: GC

## 2022-01-01 PROCEDURE — 76770 US EXAM ABDO BACK WALL COMP: CPT | Mod: 26

## 2022-01-01 PROCEDURE — 99254 IP/OBS CNSLTJ NEW/EST MOD 60: CPT

## 2022-01-01 PROCEDURE — 99233 SBSQ HOSP IP/OBS HIGH 50: CPT

## 2022-01-01 PROCEDURE — 99232 SBSQ HOSP IP/OBS MODERATE 35: CPT

## 2022-01-01 PROCEDURE — 99291 CRITICAL CARE FIRST HOUR: CPT

## 2022-01-01 PROCEDURE — 99232 SBSQ HOSP IP/OBS MODERATE 35: CPT | Mod: GC

## 2022-01-01 PROCEDURE — 93018 CV STRESS TEST I&R ONLY: CPT | Mod: GC

## 2022-01-01 PROCEDURE — 93016 CV STRESS TEST SUPVJ ONLY: CPT | Mod: GC

## 2022-01-01 PROCEDURE — 93010 ELECTROCARDIOGRAM REPORT: CPT

## 2022-01-01 PROCEDURE — 71045 X-RAY EXAM CHEST 1 VIEW: CPT | Mod: 26

## 2022-01-01 PROCEDURE — 88305 TISSUE EXAM BY PATHOLOGIST: CPT | Mod: 26

## 2022-01-01 PROCEDURE — 99222 1ST HOSP IP/OBS MODERATE 55: CPT

## 2022-01-01 PROCEDURE — 93010 ELECTROCARDIOGRAM REPORT: CPT | Mod: 76

## 2022-01-01 PROCEDURE — 93970 EXTREMITY STUDY: CPT | Mod: 26

## 2022-01-01 PROCEDURE — 93976 VASCULAR STUDY: CPT | Mod: 26

## 2022-01-01 PROCEDURE — 99254 IP/OBS CNSLTJ NEW/EST MOD 60: CPT | Mod: 1L,GC

## 2022-01-01 PROCEDURE — 36569 INSJ PICC 5 YR+ W/O IMAGING: CPT

## 2022-01-01 PROCEDURE — 74230 X-RAY XM SWLNG FUNCJ C+: CPT | Mod: 26

## 2022-01-01 PROCEDURE — 71250 CT THORAX DX C-: CPT | Mod: 26

## 2022-01-01 PROCEDURE — 99254 IP/OBS CNSLTJ NEW/EST MOD 60: CPT | Mod: GC

## 2022-01-01 PROCEDURE — 93971 EXTREMITY STUDY: CPT | Mod: 26,RT

## 2022-01-01 PROCEDURE — 43247 EGD REMOVE FOREIGN BODY: CPT | Mod: GC

## 2022-01-01 PROCEDURE — 74018 RADEX ABDOMEN 1 VIEW: CPT | Mod: 26

## 2022-01-01 PROCEDURE — 99358 PROLONG SERVICE W/O CONTACT: CPT

## 2022-01-01 PROCEDURE — 93306 TTE W/DOPPLER COMPLETE: CPT | Mod: 26

## 2022-01-01 PROCEDURE — 76937 US GUIDE VASCULAR ACCESS: CPT | Mod: 26,59

## 2022-01-01 PROCEDURE — 76937 US GUIDE VASCULAR ACCESS: CPT | Mod: 26

## 2022-01-01 PROCEDURE — 74176 CT ABD & PELVIS W/O CONTRAST: CPT | Mod: 26

## 2022-01-01 PROCEDURE — 36000 PLACE NEEDLE IN VEIN: CPT

## 2022-01-01 PROCEDURE — 78452 HT MUSCLE IMAGE SPECT MULT: CPT | Mod: 26

## 2022-01-01 PROCEDURE — 43239 EGD BIOPSY SINGLE/MULTIPLE: CPT | Mod: GC

## 2022-01-01 PROCEDURE — 99223 1ST HOSP IP/OBS HIGH 75: CPT

## 2022-01-01 RX ORDER — DIATRIZOATE MEGLUMINE 180 MG/ML
30 INJECTION, SOLUTION INTRAVESICAL ONCE
Refills: 0 | Status: COMPLETED | OUTPATIENT
Start: 2022-01-01 | End: 2022-01-01

## 2022-01-01 RX ORDER — MAGNESIUM SULFATE 500 MG/ML
1 VIAL (ML) INJECTION ONCE
Refills: 0 | Status: DISCONTINUED | OUTPATIENT
Start: 2022-01-01 | End: 2022-01-01

## 2022-01-01 RX ORDER — NYSTATIN 500MM UNIT
500000 POWDER (EA) MISCELLANEOUS THREE TIMES A DAY
Refills: 0 | Status: DISCONTINUED | OUTPATIENT
Start: 2022-01-01 | End: 2023-01-01

## 2022-01-01 RX ORDER — BUMETANIDE 0.25 MG/ML
2 INJECTION INTRAMUSCULAR; INTRAVENOUS EVERY 12 HOURS
Refills: 0 | Status: DISCONTINUED | OUTPATIENT
Start: 2022-01-01 | End: 2022-01-01

## 2022-01-01 RX ORDER — HYDROMORPHONE HYDROCHLORIDE 2 MG/ML
1 INJECTION INTRAMUSCULAR; INTRAVENOUS; SUBCUTANEOUS EVERY 6 HOURS
Refills: 0 | Status: DISCONTINUED | OUTPATIENT
Start: 2022-01-01 | End: 2022-01-01

## 2022-01-01 RX ORDER — LIPASE/PROTEASE/AMYLASE 16-48-48K
1 CAPSULE,DELAYED RELEASE (ENTERIC COATED) ORAL
Refills: 0 | Status: DISCONTINUED | OUTPATIENT
Start: 2022-01-01 | End: 2023-01-01

## 2022-01-01 RX ORDER — OXYCODONE HYDROCHLORIDE 5 MG/1
7.5 TABLET ORAL EVERY 4 HOURS
Refills: 0 | Status: DISCONTINUED | OUTPATIENT
Start: 2022-01-01 | End: 2022-01-01

## 2022-01-01 RX ORDER — SODIUM,POTASSIUM PHOSPHATES 278-250MG
1 POWDER IN PACKET (EA) ORAL ONCE
Refills: 0 | Status: DISCONTINUED | OUTPATIENT
Start: 2022-01-01 | End: 2022-01-01

## 2022-01-01 RX ORDER — IOHEXOL 300 MG/ML
30 INJECTION, SOLUTION INTRAVENOUS ONCE
Refills: 0 | Status: DISCONTINUED | OUTPATIENT
Start: 2022-01-01 | End: 2022-01-01

## 2022-01-01 RX ORDER — SODIUM ZIRCONIUM CYCLOSILICATE 10 G/10G
10 POWDER, FOR SUSPENSION ORAL EVERY 8 HOURS
Refills: 0 | Status: COMPLETED | OUTPATIENT
Start: 2022-01-01 | End: 2022-01-01

## 2022-01-01 RX ORDER — ONDANSETRON 8 MG/1
4 TABLET, FILM COATED ORAL EVERY 8 HOURS
Refills: 0 | Status: DISCONTINUED | OUTPATIENT
Start: 2022-01-01 | End: 2023-01-01

## 2022-01-01 RX ORDER — POTASSIUM CHLORIDE 20 MEQ
20 PACKET (EA) ORAL
Refills: 0 | Status: COMPLETED | OUTPATIENT
Start: 2022-01-01 | End: 2022-01-01

## 2022-01-01 RX ORDER — HYDROMORPHONE HYDROCHLORIDE 2 MG/ML
0.25 INJECTION INTRAMUSCULAR; INTRAVENOUS; SUBCUTANEOUS ONCE
Refills: 0 | Status: DISCONTINUED | OUTPATIENT
Start: 2022-01-01 | End: 2022-01-01

## 2022-01-01 RX ORDER — SODIUM CHLORIDE 9 MG/ML
1000 INJECTION, SOLUTION INTRAVENOUS
Refills: 0 | Status: COMPLETED | OUTPATIENT
Start: 2022-01-01 | End: 2022-01-01

## 2022-01-01 RX ORDER — OXYCODONE HYDROCHLORIDE 5 MG/1
15 TABLET ORAL EVERY 8 HOURS
Refills: 0 | Status: DISCONTINUED | OUTPATIENT
Start: 2022-01-01 | End: 2022-01-01

## 2022-01-01 RX ORDER — POTASSIUM CHLORIDE 20 MEQ
40 PACKET (EA) ORAL ONCE
Refills: 0 | Status: COMPLETED | OUTPATIENT
Start: 2022-01-01 | End: 2022-01-01

## 2022-01-01 RX ORDER — PIPERACILLIN AND TAZOBACTAM 4; .5 G/20ML; G/20ML
3.38 INJECTION, POWDER, LYOPHILIZED, FOR SOLUTION INTRAVENOUS ONCE
Refills: 0 | Status: COMPLETED | OUTPATIENT
Start: 2022-01-01 | End: 2022-01-01

## 2022-01-01 RX ORDER — TRAMADOL HYDROCHLORIDE 50 MG/1
25 TABLET ORAL EVERY 8 HOURS
Refills: 0 | Status: DISCONTINUED | OUTPATIENT
Start: 2022-01-01 | End: 2022-01-01

## 2022-01-01 RX ORDER — SEVELAMER CARBONATE 2400 MG/1
800 POWDER, FOR SUSPENSION ORAL THREE TIMES A DAY
Refills: 0 | Status: DISCONTINUED | OUTPATIENT
Start: 2022-01-01 | End: 2023-01-01

## 2022-01-01 RX ORDER — CALCIUM GLUCONATE 100 MG/ML
1 VIAL (ML) INTRAVENOUS ONCE
Refills: 0 | Status: COMPLETED | OUTPATIENT
Start: 2022-01-01 | End: 2022-01-01

## 2022-01-01 RX ORDER — BUMETANIDE 0.25 MG/ML
2 INJECTION INTRAMUSCULAR; INTRAVENOUS ONCE
Refills: 0 | Status: COMPLETED | OUTPATIENT
Start: 2022-01-01 | End: 2022-01-01

## 2022-01-01 RX ORDER — CALCIUM CARBONATE 500(1250)
1 TABLET ORAL EVERY 6 HOURS
Refills: 0 | Status: DISCONTINUED | OUTPATIENT
Start: 2022-01-01 | End: 2022-01-01

## 2022-01-01 RX ORDER — ONDANSETRON 8 MG/1
4 TABLET, FILM COATED ORAL ONCE
Refills: 0 | Status: COMPLETED | OUTPATIENT
Start: 2022-01-01 | End: 2022-01-01

## 2022-01-01 RX ORDER — SODIUM CHLORIDE 9 MG/ML
1000 INJECTION INTRAMUSCULAR; INTRAVENOUS; SUBCUTANEOUS
Refills: 0 | Status: DISCONTINUED | OUTPATIENT
Start: 2022-01-01 | End: 2022-01-01

## 2022-01-01 RX ORDER — MAGNESIUM SULFATE 500 MG/ML
1 VIAL (ML) INJECTION ONCE
Refills: 0 | Status: COMPLETED | OUTPATIENT
Start: 2022-01-01 | End: 2022-01-01

## 2022-01-01 RX ORDER — CALCIUM CARBONATE 500(1250)
1 TABLET ORAL ONCE
Refills: 0 | Status: DISCONTINUED | OUTPATIENT
Start: 2022-01-01 | End: 2022-01-01

## 2022-01-01 RX ORDER — SODIUM BICARBONATE 1 MEQ/ML
0.19 SYRINGE (ML) INTRAVENOUS
Qty: 150 | Refills: 0 | Status: DISCONTINUED | OUTPATIENT
Start: 2022-01-01 | End: 2022-01-01

## 2022-01-01 RX ORDER — PANTOPRAZOLE SODIUM 20 MG/1
40 TABLET, DELAYED RELEASE ORAL
Refills: 0 | Status: DISCONTINUED | OUTPATIENT
Start: 2022-01-01 | End: 2023-01-01

## 2022-01-01 RX ORDER — MORPHINE SULFATE 50 MG/1
1 CAPSULE, EXTENDED RELEASE ORAL ONCE
Refills: 0 | Status: DISCONTINUED | OUTPATIENT
Start: 2022-01-01 | End: 2022-01-01

## 2022-01-01 RX ORDER — MIDODRINE HYDROCHLORIDE 2.5 MG/1
10 TABLET ORAL EVERY 8 HOURS
Refills: 0 | Status: DISCONTINUED | OUTPATIENT
Start: 2022-01-01 | End: 2022-01-01

## 2022-01-01 RX ORDER — HYDROMORPHONE HYDROCHLORIDE 2 MG/ML
1 INJECTION INTRAMUSCULAR; INTRAVENOUS; SUBCUTANEOUS EVERY 8 HOURS
Refills: 0 | Status: DISCONTINUED | OUTPATIENT
Start: 2022-01-01 | End: 2022-01-01

## 2022-01-01 RX ORDER — CALCIUM GLUCONATE 100 MG/ML
1 VIAL (ML) INTRAVENOUS ONCE
Refills: 0 | Status: DISCONTINUED | OUTPATIENT
Start: 2022-01-01 | End: 2022-01-01

## 2022-01-01 RX ORDER — HYDROMORPHONE HYDROCHLORIDE 2 MG/ML
1 INJECTION INTRAMUSCULAR; INTRAVENOUS; SUBCUTANEOUS ONCE
Refills: 0 | Status: DISCONTINUED | OUTPATIENT
Start: 2022-01-01 | End: 2022-01-01

## 2022-01-01 RX ORDER — CALCITRIOL 0.5 UG/1
0.25 CAPSULE ORAL DAILY
Refills: 0 | Status: DISCONTINUED | OUTPATIENT
Start: 2022-01-01 | End: 2023-01-01

## 2022-01-01 RX ORDER — SUCRALFATE 1 G
1 TABLET ORAL EVERY 6 HOURS
Refills: 0 | Status: DISCONTINUED | OUTPATIENT
Start: 2022-01-01 | End: 2022-01-01

## 2022-01-01 RX ORDER — IRON SUCROSE 20 MG/ML
200 INJECTION, SOLUTION INTRAVENOUS ONCE
Refills: 0 | Status: COMPLETED | OUTPATIENT
Start: 2022-01-01 | End: 2022-01-01

## 2022-01-01 RX ORDER — DEXTROSE 50 % IN WATER 50 %
25 SYRINGE (ML) INTRAVENOUS ONCE
Refills: 0 | Status: DISCONTINUED | OUTPATIENT
Start: 2022-01-01 | End: 2022-01-01

## 2022-01-01 RX ORDER — HYDROMORPHONE HYDROCHLORIDE 2 MG/ML
0.5 INJECTION INTRAMUSCULAR; INTRAVENOUS; SUBCUTANEOUS ONCE
Refills: 0 | Status: DISCONTINUED | OUTPATIENT
Start: 2022-01-01 | End: 2022-01-01

## 2022-01-01 RX ORDER — MIDODRINE HYDROCHLORIDE 2.5 MG/1
20 TABLET ORAL ONCE
Refills: 0 | Status: DISCONTINUED | OUTPATIENT
Start: 2022-01-01 | End: 2022-01-01

## 2022-01-01 RX ORDER — PIPERACILLIN AND TAZOBACTAM 4; .5 G/20ML; G/20ML
3.38 INJECTION, POWDER, LYOPHILIZED, FOR SOLUTION INTRAVENOUS EVERY 12 HOURS
Refills: 0 | Status: COMPLETED | OUTPATIENT
Start: 2022-01-01 | End: 2022-01-01

## 2022-01-01 RX ORDER — MORPHINE SULFATE 50 MG/1
2 CAPSULE, EXTENDED RELEASE ORAL EVERY 6 HOURS
Refills: 0 | Status: DISCONTINUED | OUTPATIENT
Start: 2022-01-01 | End: 2022-01-01

## 2022-01-01 RX ORDER — MIDAZOLAM HYDROCHLORIDE 1 MG/ML
2 INJECTION, SOLUTION INTRAMUSCULAR; INTRAVENOUS ONCE
Refills: 0 | Status: DISCONTINUED | OUTPATIENT
Start: 2022-01-01 | End: 2022-01-01

## 2022-01-01 RX ORDER — PIPERACILLIN AND TAZOBACTAM 4; .5 G/20ML; G/20ML
3.38 INJECTION, POWDER, LYOPHILIZED, FOR SOLUTION INTRAVENOUS EVERY 12 HOURS
Refills: 0 | Status: DISCONTINUED | OUTPATIENT
Start: 2022-01-01 | End: 2023-01-01

## 2022-01-01 RX ORDER — HEPARIN SODIUM 5000 [USP'U]/ML
5000 INJECTION INTRAVENOUS; SUBCUTANEOUS EVERY 8 HOURS
Refills: 0 | Status: DISCONTINUED | OUTPATIENT
Start: 2022-01-01 | End: 2023-01-01

## 2022-01-01 RX ORDER — CYCLOBENZAPRINE HYDROCHLORIDE 10 MG/1
5 TABLET, FILM COATED ORAL THREE TIMES A DAY
Refills: 0 | Status: DISCONTINUED | OUTPATIENT
Start: 2022-01-01 | End: 2023-01-01

## 2022-01-01 RX ORDER — POTASSIUM CHLORIDE 20 MEQ
10 PACKET (EA) ORAL
Refills: 0 | Status: COMPLETED | OUTPATIENT
Start: 2022-01-01 | End: 2022-01-01

## 2022-01-01 RX ORDER — POLYETHYLENE GLYCOL 3350 17 G/17G
17 POWDER, FOR SOLUTION ORAL ONCE
Refills: 0 | Status: COMPLETED | OUTPATIENT
Start: 2022-01-01 | End: 2022-01-01

## 2022-01-01 RX ORDER — MORPHINE SULFATE 50 MG/1
2 CAPSULE, EXTENDED RELEASE ORAL ONCE
Refills: 0 | Status: DISCONTINUED | OUTPATIENT
Start: 2022-01-01 | End: 2022-01-01

## 2022-01-01 RX ORDER — ERYTHROPOIETIN 10000 [IU]/ML
20000 INJECTION, SOLUTION INTRAVENOUS; SUBCUTANEOUS ONCE
Refills: 0 | Status: COMPLETED | OUTPATIENT
Start: 2022-01-01 | End: 2022-01-01

## 2022-01-01 RX ORDER — BACLOFEN 100 %
5 POWDER (GRAM) MISCELLANEOUS EVERY 12 HOURS
Refills: 0 | Status: DISCONTINUED | OUTPATIENT
Start: 2022-01-01 | End: 2022-01-01

## 2022-01-01 RX ORDER — ACETAMINOPHEN 500 MG
650 TABLET ORAL EVERY 6 HOURS
Refills: 0 | Status: DISCONTINUED | OUTPATIENT
Start: 2022-01-01 | End: 2023-01-01

## 2022-01-01 RX ORDER — POTASSIUM CHLORIDE 20 MEQ
20 PACKET (EA) ORAL ONCE
Refills: 0 | Status: COMPLETED | OUTPATIENT
Start: 2022-01-01 | End: 2022-01-01

## 2022-01-01 RX ORDER — OXYCODONE HYDROCHLORIDE 5 MG/1
7.5 TABLET ORAL EVERY 4 HOURS
Refills: 0 | Status: DISCONTINUED | OUTPATIENT
Start: 2022-01-01 | End: 2023-01-01

## 2022-01-01 RX ORDER — POTASSIUM CHLORIDE 20 MEQ
40 PACKET (EA) ORAL ONCE
Refills: 0 | Status: DISCONTINUED | OUTPATIENT
Start: 2022-01-01 | End: 2022-01-01

## 2022-01-01 RX ORDER — NOREPINEPHRINE BITARTRATE/D5W 8 MG/250ML
0.05 PLASTIC BAG, INJECTION (ML) INTRAVENOUS
Qty: 8 | Refills: 0 | Status: DISCONTINUED | OUTPATIENT
Start: 2022-01-01 | End: 2022-01-01

## 2022-01-01 RX ORDER — BENZOCAINE AND MENTHOL 5; 1 G/100ML; G/100ML
1 LIQUID ORAL ONCE
Refills: 0 | Status: DISCONTINUED | OUTPATIENT
Start: 2022-01-01 | End: 2022-01-01

## 2022-01-01 RX ORDER — METOCLOPRAMIDE HCL 10 MG
10 TABLET ORAL ONCE
Refills: 0 | Status: COMPLETED | OUTPATIENT
Start: 2022-01-01 | End: 2022-01-01

## 2022-01-01 RX ORDER — OXYCODONE HYDROCHLORIDE 5 MG/1
5 TABLET ORAL ONCE
Refills: 0 | Status: DISCONTINUED | OUTPATIENT
Start: 2022-01-01 | End: 2022-01-01

## 2022-01-01 RX ORDER — DEXTROSE 50 % IN WATER 50 %
50 SYRINGE (ML) INTRAVENOUS ONCE
Refills: 0 | Status: COMPLETED | OUTPATIENT
Start: 2022-01-01 | End: 2022-01-01

## 2022-01-01 RX ORDER — POTASSIUM CHLORIDE 20 MEQ
20 PACKET (EA) ORAL
Refills: 0 | Status: DISCONTINUED | OUTPATIENT
Start: 2022-01-01 | End: 2022-01-01

## 2022-01-01 RX ORDER — THIAMINE MONONITRATE (VIT B1) 100 MG
100 TABLET ORAL DAILY
Refills: 0 | Status: DISCONTINUED | OUTPATIENT
Start: 2022-01-01 | End: 2023-01-01

## 2022-01-01 RX ORDER — NIFEDIPINE 30 MG
30 TABLET, EXTENDED RELEASE 24 HR ORAL DAILY
Refills: 0 | Status: DISCONTINUED | OUTPATIENT
Start: 2022-01-01 | End: 2023-01-01

## 2022-01-01 RX ORDER — PANTOPRAZOLE SODIUM 20 MG/1
40 TABLET, DELAYED RELEASE ORAL
Refills: 0 | Status: DISCONTINUED | OUTPATIENT
Start: 2022-01-01 | End: 2022-01-01

## 2022-01-01 RX ORDER — PIPERACILLIN AND TAZOBACTAM 4; .5 G/20ML; G/20ML
3.38 INJECTION, POWDER, LYOPHILIZED, FOR SOLUTION INTRAVENOUS EVERY 12 HOURS
Refills: 0 | Status: DISCONTINUED | OUTPATIENT
Start: 2022-01-01 | End: 2022-01-01

## 2022-01-01 RX ORDER — DEXMEDETOMIDINE HYDROCHLORIDE IN 0.9% SODIUM CHLORIDE 4 UG/ML
0.5 INJECTION INTRAVENOUS
Qty: 200 | Refills: 0 | Status: DISCONTINUED | OUTPATIENT
Start: 2022-01-01 | End: 2022-01-01

## 2022-01-01 RX ORDER — POTASSIUM CHLORIDE 20 MEQ
40 PACKET (EA) ORAL EVERY 4 HOURS
Refills: 0 | Status: DISCONTINUED | OUTPATIENT
Start: 2022-01-01 | End: 2022-01-01

## 2022-01-01 RX ORDER — SENNA PLUS 8.6 MG/1
2 TABLET ORAL AT BEDTIME
Refills: 0 | Status: DISCONTINUED | OUTPATIENT
Start: 2022-01-01 | End: 2022-01-01

## 2022-01-01 RX ORDER — SUCRALFATE 1 G
1 TABLET ORAL EVERY 6 HOURS
Refills: 0 | Status: DISCONTINUED | OUTPATIENT
Start: 2022-01-01 | End: 2023-01-01

## 2022-01-01 RX ORDER — POTASSIUM CHLORIDE 20 MEQ
40 PACKET (EA) ORAL EVERY 4 HOURS
Refills: 0 | Status: COMPLETED | OUTPATIENT
Start: 2022-01-01 | End: 2022-01-01

## 2022-01-01 RX ORDER — ERYTHROPOIETIN 10000 [IU]/ML
10000 INJECTION, SOLUTION INTRAVENOUS; SUBCUTANEOUS ONCE
Refills: 0 | Status: COMPLETED | OUTPATIENT
Start: 2022-01-01 | End: 2022-01-01

## 2022-01-01 RX ORDER — BUMETANIDE 0.25 MG/ML
2 INJECTION INTRAMUSCULAR; INTRAVENOUS
Qty: 20 | Refills: 0 | Status: DISCONTINUED | OUTPATIENT
Start: 2022-01-01 | End: 2022-01-01

## 2022-01-01 RX ORDER — SODIUM CHLORIDE 9 MG/ML
1000 INJECTION, SOLUTION INTRAVENOUS
Refills: 0 | Status: DISCONTINUED | OUTPATIENT
Start: 2022-01-01 | End: 2022-01-01

## 2022-01-01 RX ORDER — FOLIC ACID 0.8 MG
1 TABLET ORAL DAILY
Refills: 0 | Status: DISCONTINUED | OUTPATIENT
Start: 2022-01-01 | End: 2023-01-01

## 2022-01-01 RX ORDER — MIDODRINE HYDROCHLORIDE 2.5 MG/1
10 TABLET ORAL ONCE
Refills: 0 | Status: DISCONTINUED | OUTPATIENT
Start: 2022-01-01 | End: 2022-01-01

## 2022-01-01 RX ORDER — MAGNESIUM OXIDE 400 MG ORAL TABLET 241.3 MG
400 TABLET ORAL ONCE
Refills: 0 | Status: COMPLETED | OUTPATIENT
Start: 2022-01-01 | End: 2022-01-01

## 2022-01-01 RX ORDER — LACTULOSE 10 G/15ML
20 SOLUTION ORAL EVERY 8 HOURS
Refills: 0 | Status: DISCONTINUED | OUTPATIENT
Start: 2022-01-01 | End: 2022-01-01

## 2022-01-01 RX ORDER — PROPOFOL 10 MG/ML
10 INJECTION, EMULSION INTRAVENOUS
Qty: 1000 | Refills: 0 | Status: DISCONTINUED | OUTPATIENT
Start: 2022-01-01 | End: 2022-01-01

## 2022-01-01 RX ORDER — IRON SUCROSE 20 MG/ML
100 INJECTION, SOLUTION INTRAVENOUS ONCE
Refills: 0 | Status: COMPLETED | OUTPATIENT
Start: 2022-01-01 | End: 2022-01-01

## 2022-01-01 RX ORDER — FENTANYL CITRATE 50 UG/ML
50 INJECTION INTRAVENOUS ONCE
Refills: 0 | Status: DISCONTINUED | OUTPATIENT
Start: 2022-01-01 | End: 2022-01-01

## 2022-01-01 RX ORDER — HEPARIN SODIUM 5000 [USP'U]/ML
5000 INJECTION INTRAVENOUS; SUBCUTANEOUS EVERY 12 HOURS
Refills: 0 | Status: DISCONTINUED | OUTPATIENT
Start: 2022-01-01 | End: 2022-01-01

## 2022-01-01 RX ADMIN — HEPARIN SODIUM 5000 UNIT(S): 5000 INJECTION INTRAVENOUS; SUBCUTANEOUS at 14:49

## 2022-01-01 RX ADMIN — MIDODRINE HYDROCHLORIDE 10 MILLIGRAM(S): 2.5 TABLET ORAL at 06:13

## 2022-01-01 RX ADMIN — HEPARIN SODIUM 5000 UNIT(S): 5000 INJECTION INTRAVENOUS; SUBCUTANEOUS at 14:48

## 2022-01-01 RX ADMIN — OXYCODONE HYDROCHLORIDE 5 MILLIGRAM(S): 5 TABLET ORAL at 23:14

## 2022-01-01 RX ADMIN — LACTULOSE 20 GRAM(S): 10 SOLUTION ORAL at 06:14

## 2022-01-01 RX ADMIN — OXYCODONE HYDROCHLORIDE 7.5 MILLIGRAM(S): 5 TABLET ORAL at 06:58

## 2022-01-01 RX ADMIN — Medication 600 MILLIGRAM(S): at 18:07

## 2022-01-01 RX ADMIN — PIPERACILLIN AND TAZOBACTAM 25 GRAM(S): 4; .5 INJECTION, POWDER, LYOPHILIZED, FOR SOLUTION INTRAVENOUS at 21:16

## 2022-01-01 RX ADMIN — OXYCODONE HYDROCHLORIDE 15 MILLIGRAM(S): 5 TABLET ORAL at 07:44

## 2022-01-01 RX ADMIN — CYCLOBENZAPRINE HYDROCHLORIDE 5 MILLIGRAM(S): 10 TABLET, FILM COATED ORAL at 12:56

## 2022-01-01 RX ADMIN — Medication 1 GRAM(S): at 00:32

## 2022-01-01 RX ADMIN — PIPERACILLIN AND TAZOBACTAM 25 GRAM(S): 4; .5 INJECTION, POWDER, LYOPHILIZED, FOR SOLUTION INTRAVENOUS at 18:01

## 2022-01-01 RX ADMIN — HEPARIN SODIUM 5000 UNIT(S): 5000 INJECTION INTRAVENOUS; SUBCUTANEOUS at 05:41

## 2022-01-01 RX ADMIN — Medication 500000 UNIT(S): at 13:43

## 2022-01-01 RX ADMIN — HEPARIN SODIUM 5000 UNIT(S): 5000 INJECTION INTRAVENOUS; SUBCUTANEOUS at 06:32

## 2022-01-01 RX ADMIN — PIPERACILLIN AND TAZOBACTAM 25 GRAM(S): 4; .5 INJECTION, POWDER, LYOPHILIZED, FOR SOLUTION INTRAVENOUS at 12:27

## 2022-01-01 RX ADMIN — HYDROMORPHONE HYDROCHLORIDE 1 MILLIGRAM(S): 2 INJECTION INTRAMUSCULAR; INTRAVENOUS; SUBCUTANEOUS at 02:25

## 2022-01-01 RX ADMIN — MAGNESIUM OXIDE 400 MG ORAL TABLET 400 MILLIGRAM(S): 241.3 TABLET ORAL at 15:50

## 2022-01-01 RX ADMIN — OXYCODONE HYDROCHLORIDE 7.5 MILLIGRAM(S): 5 TABLET ORAL at 08:30

## 2022-01-01 RX ADMIN — PIPERACILLIN AND TAZOBACTAM 25 GRAM(S): 4; .5 INJECTION, POWDER, LYOPHILIZED, FOR SOLUTION INTRAVENOUS at 14:21

## 2022-01-01 RX ADMIN — PIPERACILLIN AND TAZOBACTAM 25 GRAM(S): 4; .5 INJECTION, POWDER, LYOPHILIZED, FOR SOLUTION INTRAVENOUS at 12:51

## 2022-01-01 RX ADMIN — Medication 100 MILLIEQUIVALENT(S): at 06:31

## 2022-01-01 RX ADMIN — Medication 600 MILLIGRAM(S): at 17:09

## 2022-01-01 RX ADMIN — CHLORHEXIDINE GLUCONATE 1 APPLICATION(S): 213 SOLUTION TOPICAL at 06:35

## 2022-01-01 RX ADMIN — HYDROMORPHONE HYDROCHLORIDE 1 MILLIGRAM(S): 2 INJECTION INTRAMUSCULAR; INTRAVENOUS; SUBCUTANEOUS at 23:30

## 2022-01-01 RX ADMIN — Medication 1 MILLIGRAM(S): at 11:56

## 2022-01-01 RX ADMIN — CHLORHEXIDINE GLUCONATE 15 MILLILITER(S): 213 SOLUTION TOPICAL at 05:34

## 2022-01-01 RX ADMIN — OXYCODONE HYDROCHLORIDE 15 MILLIGRAM(S): 5 TABLET ORAL at 03:37

## 2022-01-01 RX ADMIN — Medication 1 CAPSULE(S): at 09:07

## 2022-01-01 RX ADMIN — PIPERACILLIN AND TAZOBACTAM 25 GRAM(S): 4; .5 INJECTION, POWDER, LYOPHILIZED, FOR SOLUTION INTRAVENOUS at 22:14

## 2022-01-01 RX ADMIN — OXYCODONE HYDROCHLORIDE 15 MILLIGRAM(S): 5 TABLET ORAL at 20:06

## 2022-01-01 RX ADMIN — OXYCODONE HYDROCHLORIDE 7.5 MILLIGRAM(S): 5 TABLET ORAL at 12:58

## 2022-01-01 RX ADMIN — HYDROMORPHONE HYDROCHLORIDE 1 MILLIGRAM(S): 2 INJECTION INTRAMUSCULAR; INTRAVENOUS; SUBCUTANEOUS at 21:21

## 2022-01-01 RX ADMIN — PANTOPRAZOLE SODIUM 40 MILLIGRAM(S): 20 TABLET, DELAYED RELEASE ORAL at 17:09

## 2022-01-01 RX ADMIN — CYCLOBENZAPRINE HYDROCHLORIDE 5 MILLIGRAM(S): 10 TABLET, FILM COATED ORAL at 23:07

## 2022-01-01 RX ADMIN — HYDROMORPHONE HYDROCHLORIDE 0.25 MILLIGRAM(S): 2 INJECTION INTRAMUSCULAR; INTRAVENOUS; SUBCUTANEOUS at 13:58

## 2022-01-01 RX ADMIN — HEPARIN SODIUM 5000 UNIT(S): 5000 INJECTION INTRAVENOUS; SUBCUTANEOUS at 11:56

## 2022-01-01 RX ADMIN — MORPHINE SULFATE 2 MILLIGRAM(S): 50 CAPSULE, EXTENDED RELEASE ORAL at 20:13

## 2022-01-01 RX ADMIN — HYDROMORPHONE HYDROCHLORIDE 1 MILLIGRAM(S): 2 INJECTION INTRAMUSCULAR; INTRAVENOUS; SUBCUTANEOUS at 13:10

## 2022-01-01 RX ADMIN — PIPERACILLIN AND TAZOBACTAM 200 GRAM(S): 4; .5 INJECTION, POWDER, LYOPHILIZED, FOR SOLUTION INTRAVENOUS at 06:18

## 2022-01-01 RX ADMIN — OXYCODONE HYDROCHLORIDE 7.5 MILLIGRAM(S): 5 TABLET ORAL at 22:42

## 2022-01-01 RX ADMIN — Medication 50 MILLIEQUIVALENT(S): at 20:00

## 2022-01-01 RX ADMIN — OXYCODONE HYDROCHLORIDE 15 MILLIGRAM(S): 5 TABLET ORAL at 19:29

## 2022-01-01 RX ADMIN — MIDODRINE HYDROCHLORIDE 10 MILLIGRAM(S): 2.5 TABLET ORAL at 05:44

## 2022-01-01 RX ADMIN — Medication 75 MEQ/KG/HR: at 15:10

## 2022-01-01 RX ADMIN — PANTOPRAZOLE SODIUM 40 MILLIGRAM(S): 20 TABLET, DELAYED RELEASE ORAL at 17:29

## 2022-01-01 RX ADMIN — Medication 1 TABLET(S): at 05:24

## 2022-01-01 RX ADMIN — MORPHINE SULFATE 2 MILLIGRAM(S): 50 CAPSULE, EXTENDED RELEASE ORAL at 13:20

## 2022-01-01 RX ADMIN — Medication 1 TABLET(S): at 00:11

## 2022-01-01 RX ADMIN — Medication 50 MILLIEQUIVALENT(S): at 15:50

## 2022-01-01 RX ADMIN — CHLORHEXIDINE GLUCONATE 15 MILLILITER(S): 213 SOLUTION TOPICAL at 06:30

## 2022-01-01 RX ADMIN — CEFTRIAXONE 100 MILLIGRAM(S): 500 INJECTION, POWDER, FOR SOLUTION INTRAMUSCULAR; INTRAVENOUS at 06:31

## 2022-01-01 RX ADMIN — HYDROMORPHONE HYDROCHLORIDE 1 MILLIGRAM(S): 2 INJECTION INTRAMUSCULAR; INTRAVENOUS; SUBCUTANEOUS at 20:57

## 2022-01-01 RX ADMIN — OXYCODONE HYDROCHLORIDE 15 MILLIGRAM(S): 5 TABLET ORAL at 23:40

## 2022-01-01 RX ADMIN — OXYCODONE HYDROCHLORIDE 15 MILLIGRAM(S): 5 TABLET ORAL at 18:59

## 2022-01-01 RX ADMIN — PANTOPRAZOLE SODIUM 40 MILLIGRAM(S): 20 TABLET, DELAYED RELEASE ORAL at 11:31

## 2022-01-01 RX ADMIN — LACTULOSE 20 GRAM(S): 10 SOLUTION ORAL at 11:46

## 2022-01-01 RX ADMIN — MORPHINE SULFATE 2 MILLIGRAM(S): 50 CAPSULE, EXTENDED RELEASE ORAL at 12:00

## 2022-01-01 RX ADMIN — HEPARIN SODIUM 5000 UNIT(S): 5000 INJECTION INTRAVENOUS; SUBCUTANEOUS at 14:40

## 2022-01-01 RX ADMIN — PANTOPRAZOLE SODIUM 40 MILLIGRAM(S): 20 TABLET, DELAYED RELEASE ORAL at 06:36

## 2022-01-01 RX ADMIN — PIPERACILLIN AND TAZOBACTAM 25 GRAM(S): 4; .5 INJECTION, POWDER, LYOPHILIZED, FOR SOLUTION INTRAVENOUS at 13:02

## 2022-01-01 RX ADMIN — Medication 30 MILLIGRAM(S): at 07:43

## 2022-01-01 RX ADMIN — OXYCODONE HYDROCHLORIDE 15 MILLIGRAM(S): 5 TABLET ORAL at 01:44

## 2022-01-01 RX ADMIN — IRON SUCROSE 100 MILLIGRAM(S): 20 INJECTION, SOLUTION INTRAVENOUS at 11:01

## 2022-01-01 RX ADMIN — OXYCODONE HYDROCHLORIDE 15 MILLIGRAM(S): 5 TABLET ORAL at 09:13

## 2022-01-01 RX ADMIN — PIPERACILLIN AND TAZOBACTAM 25 GRAM(S): 4; .5 INJECTION, POWDER, LYOPHILIZED, FOR SOLUTION INTRAVENOUS at 11:46

## 2022-01-01 RX ADMIN — Medication 1 TABLET(S): at 19:56

## 2022-01-01 RX ADMIN — TRAMADOL HYDROCHLORIDE 25 MILLIGRAM(S): 50 TABLET ORAL at 23:13

## 2022-01-01 RX ADMIN — CHLORHEXIDINE GLUCONATE 15 MILLILITER(S): 213 SOLUTION TOPICAL at 05:57

## 2022-01-01 RX ADMIN — Medication 3.4 MICROGRAM(S)/KG/MIN: at 17:13

## 2022-01-01 RX ADMIN — Medication 100 GRAM(S): at 08:13

## 2022-01-01 RX ADMIN — SODIUM CHLORIDE 100 MILLILITER(S): 9 INJECTION, SOLUTION INTRAVENOUS at 10:38

## 2022-01-01 RX ADMIN — Medication 30 MILLIGRAM(S): at 07:32

## 2022-01-01 RX ADMIN — HYDROMORPHONE HYDROCHLORIDE 0.25 MILLIGRAM(S): 2 INJECTION INTRAMUSCULAR; INTRAVENOUS; SUBCUTANEOUS at 13:37

## 2022-01-01 RX ADMIN — PIPERACILLIN AND TAZOBACTAM 25 GRAM(S): 4; .5 INJECTION, POWDER, LYOPHILIZED, FOR SOLUTION INTRAVENOUS at 00:16

## 2022-01-01 RX ADMIN — MIDODRINE HYDROCHLORIDE 10 MILLIGRAM(S): 2.5 TABLET ORAL at 06:06

## 2022-01-01 RX ADMIN — OXYCODONE HYDROCHLORIDE 7.5 MILLIGRAM(S): 5 TABLET ORAL at 18:04

## 2022-01-01 RX ADMIN — PANTOPRAZOLE SODIUM 40 MILLIGRAM(S): 20 TABLET, DELAYED RELEASE ORAL at 05:40

## 2022-01-01 RX ADMIN — BUMETANIDE 2 MILLIGRAM(S): 0.25 INJECTION INTRAMUSCULAR; INTRAVENOUS at 10:28

## 2022-01-01 RX ADMIN — HYDROMORPHONE HYDROCHLORIDE 1 MILLIGRAM(S): 2 INJECTION INTRAMUSCULAR; INTRAVENOUS; SUBCUTANEOUS at 15:34

## 2022-01-01 RX ADMIN — HEPARIN SODIUM 5000 UNIT(S): 5000 INJECTION INTRAVENOUS; SUBCUTANEOUS at 21:32

## 2022-01-01 RX ADMIN — Medication 100 MILLIEQUIVALENT(S): at 22:08

## 2022-01-01 RX ADMIN — CYCLOBENZAPRINE HYDROCHLORIDE 5 MILLIGRAM(S): 10 TABLET, FILM COATED ORAL at 02:30

## 2022-01-01 RX ADMIN — Medication 1 TABLET(S): at 02:43

## 2022-01-01 RX ADMIN — ONDANSETRON 4 MILLIGRAM(S): 8 TABLET, FILM COATED ORAL at 04:41

## 2022-01-01 RX ADMIN — HEPARIN SODIUM 5000 UNIT(S): 5000 INJECTION INTRAVENOUS; SUBCUTANEOUS at 22:00

## 2022-01-01 RX ADMIN — HYDROMORPHONE HYDROCHLORIDE 1 MILLIGRAM(S): 2 INJECTION INTRAMUSCULAR; INTRAVENOUS; SUBCUTANEOUS at 13:00

## 2022-01-01 RX ADMIN — CHLORHEXIDINE GLUCONATE 1 APPLICATION(S): 213 SOLUTION TOPICAL at 05:58

## 2022-01-01 RX ADMIN — HEPARIN SODIUM 5000 UNIT(S): 5000 INJECTION INTRAVENOUS; SUBCUTANEOUS at 06:25

## 2022-01-01 RX ADMIN — CEFTRIAXONE 100 MILLIGRAM(S): 500 INJECTION, POWDER, FOR SOLUTION INTRAMUSCULAR; INTRAVENOUS at 05:35

## 2022-01-01 RX ADMIN — MIDODRINE HYDROCHLORIDE 10 MILLIGRAM(S): 2.5 TABLET ORAL at 21:24

## 2022-01-01 RX ADMIN — Medication 100 MILLIGRAM(S): at 11:56

## 2022-01-01 RX ADMIN — HEPARIN SODIUM 5000 UNIT(S): 5000 INJECTION INTRAVENOUS; SUBCUTANEOUS at 05:58

## 2022-01-01 RX ADMIN — Medication 1 TABLET(S): at 15:44

## 2022-01-01 RX ADMIN — OXYCODONE HYDROCHLORIDE 7.5 MILLIGRAM(S): 5 TABLET ORAL at 18:17

## 2022-01-01 RX ADMIN — CHLORHEXIDINE GLUCONATE 1 APPLICATION(S): 213 SOLUTION TOPICAL at 05:45

## 2022-01-01 RX ADMIN — Medication 40 MILLIEQUIVALENT(S): at 17:16

## 2022-01-01 RX ADMIN — BUMETANIDE 10 MG/HR: 0.25 INJECTION INTRAMUSCULAR; INTRAVENOUS at 14:45

## 2022-01-01 RX ADMIN — DEXMEDETOMIDINE HYDROCHLORIDE IN 0.9% SODIUM CHLORIDE 5.29 MICROGRAM(S)/KG/HR: 4 INJECTION INTRAVENOUS at 10:31

## 2022-01-01 RX ADMIN — Medication 40 MILLIEQUIVALENT(S): at 13:24

## 2022-01-01 RX ADMIN — PANTOPRAZOLE SODIUM 40 MILLIGRAM(S): 20 TABLET, DELAYED RELEASE ORAL at 06:27

## 2022-01-01 RX ADMIN — CHLORHEXIDINE GLUCONATE 1 APPLICATION(S): 213 SOLUTION TOPICAL at 06:21

## 2022-01-01 RX ADMIN — PANTOPRAZOLE SODIUM 40 MILLIGRAM(S): 20 TABLET, DELAYED RELEASE ORAL at 17:42

## 2022-01-01 RX ADMIN — MIDODRINE HYDROCHLORIDE 10 MILLIGRAM(S): 2.5 TABLET ORAL at 15:34

## 2022-01-01 RX ADMIN — SODIUM CHLORIDE 100 MILLILITER(S): 9 INJECTION, SOLUTION INTRAVENOUS at 10:01

## 2022-01-01 RX ADMIN — Medication 50 MILLIEQUIVALENT(S): at 06:39

## 2022-01-01 RX ADMIN — LACTULOSE 20 GRAM(S): 10 SOLUTION ORAL at 17:18

## 2022-01-01 RX ADMIN — OXYCODONE HYDROCHLORIDE 15 MILLIGRAM(S): 5 TABLET ORAL at 12:00

## 2022-01-01 RX ADMIN — OXYCODONE HYDROCHLORIDE 7.5 MILLIGRAM(S): 5 TABLET ORAL at 21:58

## 2022-01-01 RX ADMIN — OXYCODONE HYDROCHLORIDE 15 MILLIGRAM(S): 5 TABLET ORAL at 18:02

## 2022-01-01 RX ADMIN — CHLORHEXIDINE GLUCONATE 1 APPLICATION(S): 213 SOLUTION TOPICAL at 05:23

## 2022-01-01 RX ADMIN — Medication 40 MILLIEQUIVALENT(S): at 21:20

## 2022-01-01 RX ADMIN — PIPERACILLIN AND TAZOBACTAM 25 GRAM(S): 4; .5 INJECTION, POWDER, LYOPHILIZED, FOR SOLUTION INTRAVENOUS at 16:24

## 2022-01-01 RX ADMIN — SODIUM CHLORIDE 100 MILLILITER(S): 9 INJECTION, SOLUTION INTRAVENOUS at 16:24

## 2022-01-01 RX ADMIN — PANTOPRAZOLE SODIUM 40 MILLIGRAM(S): 20 TABLET, DELAYED RELEASE ORAL at 18:28

## 2022-01-01 RX ADMIN — PIPERACILLIN AND TAZOBACTAM 25 GRAM(S): 4; .5 INJECTION, POWDER, LYOPHILIZED, FOR SOLUTION INTRAVENOUS at 12:37

## 2022-01-01 RX ADMIN — SODIUM CHLORIDE 100 MILLILITER(S): 9 INJECTION, SOLUTION INTRAVENOUS at 05:23

## 2022-01-01 RX ADMIN — OXYCODONE HYDROCHLORIDE 15 MILLIGRAM(S): 5 TABLET ORAL at 15:45

## 2022-01-01 RX ADMIN — Medication 10 MILLIGRAM(S): at 15:08

## 2022-01-01 RX ADMIN — OXYCODONE HYDROCHLORIDE 7.5 MILLIGRAM(S): 5 TABLET ORAL at 07:13

## 2022-01-01 RX ADMIN — PIPERACILLIN AND TAZOBACTAM 25 GRAM(S): 4; .5 INJECTION, POWDER, LYOPHILIZED, FOR SOLUTION INTRAVENOUS at 11:39

## 2022-01-01 RX ADMIN — OXYCODONE HYDROCHLORIDE 7.5 MILLIGRAM(S): 5 TABLET ORAL at 17:14

## 2022-01-01 RX ADMIN — Medication 10 MILLIGRAM(S): at 18:07

## 2022-01-01 RX ADMIN — OXYCODONE HYDROCHLORIDE 7.5 MILLIGRAM(S): 5 TABLET ORAL at 17:47

## 2022-01-01 RX ADMIN — OXYCODONE HYDROCHLORIDE 15 MILLIGRAM(S): 5 TABLET ORAL at 22:20

## 2022-01-01 RX ADMIN — CHLORHEXIDINE GLUCONATE 1 APPLICATION(S): 213 SOLUTION TOPICAL at 05:20

## 2022-01-01 RX ADMIN — Medication 100 GRAM(S): at 08:37

## 2022-01-01 RX ADMIN — HYDROMORPHONE HYDROCHLORIDE 1 MILLIGRAM(S): 2 INJECTION INTRAMUSCULAR; INTRAVENOUS; SUBCUTANEOUS at 06:52

## 2022-01-01 RX ADMIN — Medication 500000 UNIT(S): at 13:59

## 2022-01-01 RX ADMIN — MORPHINE SULFATE 1 MILLIGRAM(S): 50 CAPSULE, EXTENDED RELEASE ORAL at 05:25

## 2022-01-01 RX ADMIN — Medication 20 MILLIEQUIVALENT(S): at 06:06

## 2022-01-01 RX ADMIN — SODIUM CHLORIDE 100 MILLILITER(S): 9 INJECTION, SOLUTION INTRAVENOUS at 12:30

## 2022-01-01 RX ADMIN — PIPERACILLIN AND TAZOBACTAM 25 GRAM(S): 4; .5 INJECTION, POWDER, LYOPHILIZED, FOR SOLUTION INTRAVENOUS at 18:08

## 2022-01-01 RX ADMIN — MORPHINE SULFATE 2 MILLIGRAM(S): 50 CAPSULE, EXTENDED RELEASE ORAL at 00:44

## 2022-01-01 RX ADMIN — TRAMADOL HYDROCHLORIDE 25 MILLIGRAM(S): 50 TABLET ORAL at 19:59

## 2022-01-01 RX ADMIN — PIPERACILLIN AND TAZOBACTAM 25 GRAM(S): 4; .5 INJECTION, POWDER, LYOPHILIZED, FOR SOLUTION INTRAVENOUS at 23:16

## 2022-01-01 RX ADMIN — PIPERACILLIN AND TAZOBACTAM 25 GRAM(S): 4; .5 INJECTION, POWDER, LYOPHILIZED, FOR SOLUTION INTRAVENOUS at 23:36

## 2022-01-01 RX ADMIN — DEXMEDETOMIDINE HYDROCHLORIDE IN 0.9% SODIUM CHLORIDE 5.29 MICROGRAM(S)/KG/HR: 4 INJECTION INTRAVENOUS at 03:37

## 2022-01-01 RX ADMIN — Medication 1 CAPSULE(S): at 09:48

## 2022-01-01 RX ADMIN — PANTOPRAZOLE SODIUM 40 MILLIGRAM(S): 20 TABLET, DELAYED RELEASE ORAL at 07:43

## 2022-01-01 RX ADMIN — PIPERACILLIN AND TAZOBACTAM 25 GRAM(S): 4; .5 INJECTION, POWDER, LYOPHILIZED, FOR SOLUTION INTRAVENOUS at 15:49

## 2022-01-01 RX ADMIN — TRAMADOL HYDROCHLORIDE 25 MILLIGRAM(S): 50 TABLET ORAL at 18:07

## 2022-01-01 RX ADMIN — PIPERACILLIN AND TAZOBACTAM 25 GRAM(S): 4; .5 INJECTION, POWDER, LYOPHILIZED, FOR SOLUTION INTRAVENOUS at 23:23

## 2022-01-01 RX ADMIN — Medication 3.4 MICROGRAM(S)/KG/MIN: at 08:21

## 2022-01-01 RX ADMIN — CHLORHEXIDINE GLUCONATE 1 APPLICATION(S): 213 SOLUTION TOPICAL at 06:07

## 2022-01-01 RX ADMIN — PIPERACILLIN AND TAZOBACTAM 25 GRAM(S): 4; .5 INJECTION, POWDER, LYOPHILIZED, FOR SOLUTION INTRAVENOUS at 11:10

## 2022-01-01 RX ADMIN — CHLORHEXIDINE GLUCONATE 15 MILLILITER(S): 213 SOLUTION TOPICAL at 17:07

## 2022-01-01 RX ADMIN — MORPHINE SULFATE 2 MILLIGRAM(S): 50 CAPSULE, EXTENDED RELEASE ORAL at 12:30

## 2022-01-01 RX ADMIN — BUMETANIDE 10 MG/HR: 0.25 INJECTION INTRAMUSCULAR; INTRAVENOUS at 19:22

## 2022-01-01 RX ADMIN — Medication 100 GRAM(S): at 11:32

## 2022-01-01 RX ADMIN — HYDROMORPHONE HYDROCHLORIDE 1 MILLIGRAM(S): 2 INJECTION INTRAMUSCULAR; INTRAVENOUS; SUBCUTANEOUS at 21:30

## 2022-01-01 RX ADMIN — PANTOPRAZOLE SODIUM 40 MILLIGRAM(S): 20 TABLET, DELAYED RELEASE ORAL at 11:10

## 2022-01-01 RX ADMIN — HEPARIN SODIUM 5000 UNIT(S): 5000 INJECTION INTRAVENOUS; SUBCUTANEOUS at 06:28

## 2022-01-01 RX ADMIN — PROPOFOL 2.54 MICROGRAM(S)/KG/MIN: 10 INJECTION, EMULSION INTRAVENOUS at 08:37

## 2022-01-01 RX ADMIN — Medication 50 MILLIEQUIVALENT(S): at 11:07

## 2022-01-01 RX ADMIN — PIPERACILLIN AND TAZOBACTAM 25 GRAM(S): 4; .5 INJECTION, POWDER, LYOPHILIZED, FOR SOLUTION INTRAVENOUS at 18:04

## 2022-01-01 RX ADMIN — CYCLOBENZAPRINE HYDROCHLORIDE 5 MILLIGRAM(S): 10 TABLET, FILM COATED ORAL at 22:43

## 2022-01-01 RX ADMIN — MORPHINE SULFATE 2 MILLIGRAM(S): 50 CAPSULE, EXTENDED RELEASE ORAL at 13:00

## 2022-01-01 RX ADMIN — OXYCODONE HYDROCHLORIDE 15 MILLIGRAM(S): 5 TABLET ORAL at 17:02

## 2022-01-01 RX ADMIN — HYDROMORPHONE HYDROCHLORIDE 1 MILLIGRAM(S): 2 INJECTION INTRAMUSCULAR; INTRAVENOUS; SUBCUTANEOUS at 21:40

## 2022-01-01 RX ADMIN — OXYCODONE HYDROCHLORIDE 7.5 MILLIGRAM(S): 5 TABLET ORAL at 00:00

## 2022-01-01 RX ADMIN — OXYCODONE HYDROCHLORIDE 7.5 MILLIGRAM(S): 5 TABLET ORAL at 16:45

## 2022-01-01 RX ADMIN — LACTULOSE 20 GRAM(S): 10 SOLUTION ORAL at 21:29

## 2022-01-01 RX ADMIN — CHLORHEXIDINE GLUCONATE 1 APPLICATION(S): 213 SOLUTION TOPICAL at 05:44

## 2022-01-01 RX ADMIN — HEPARIN SODIUM 5000 UNIT(S): 5000 INJECTION INTRAVENOUS; SUBCUTANEOUS at 05:36

## 2022-01-01 RX ADMIN — HEPARIN SODIUM 5000 UNIT(S): 5000 INJECTION INTRAVENOUS; SUBCUTANEOUS at 23:50

## 2022-01-01 RX ADMIN — HEPARIN SODIUM 5000 UNIT(S): 5000 INJECTION INTRAVENOUS; SUBCUTANEOUS at 05:24

## 2022-01-01 RX ADMIN — SODIUM CHLORIDE 75 MILLILITER(S): 9 INJECTION, SOLUTION INTRAVENOUS at 11:13

## 2022-01-01 RX ADMIN — PANTOPRAZOLE SODIUM 40 MILLIGRAM(S): 20 TABLET, DELAYED RELEASE ORAL at 06:59

## 2022-01-01 RX ADMIN — HEPARIN SODIUM 5000 UNIT(S): 5000 INJECTION INTRAVENOUS; SUBCUTANEOUS at 13:30

## 2022-01-01 RX ADMIN — Medication 1 TABLET(S): at 15:21

## 2022-01-01 RX ADMIN — LACTULOSE 20 GRAM(S): 10 SOLUTION ORAL at 17:07

## 2022-01-01 RX ADMIN — Medication 75 MEQ/KG/HR: at 14:42

## 2022-01-01 RX ADMIN — PIPERACILLIN AND TAZOBACTAM 25 GRAM(S): 4; .5 INJECTION, POWDER, LYOPHILIZED, FOR SOLUTION INTRAVENOUS at 02:03

## 2022-01-01 RX ADMIN — HYDROMORPHONE HYDROCHLORIDE 1 MILLIGRAM(S): 2 INJECTION INTRAMUSCULAR; INTRAVENOUS; SUBCUTANEOUS at 15:50

## 2022-01-01 RX ADMIN — HEPARIN SODIUM 5000 UNIT(S): 5000 INJECTION INTRAVENOUS; SUBCUTANEOUS at 14:42

## 2022-01-01 RX ADMIN — OXYCODONE HYDROCHLORIDE 5 MILLIGRAM(S): 5 TABLET ORAL at 03:21

## 2022-01-01 RX ADMIN — OXYCODONE HYDROCHLORIDE 7.5 MILLIGRAM(S): 5 TABLET ORAL at 22:01

## 2022-01-01 RX ADMIN — LACTULOSE 20 GRAM(S): 10 SOLUTION ORAL at 17:57

## 2022-01-01 RX ADMIN — PANTOPRAZOLE SODIUM 40 MILLIGRAM(S): 20 TABLET, DELAYED RELEASE ORAL at 06:17

## 2022-01-01 RX ADMIN — PIPERACILLIN AND TAZOBACTAM 25 GRAM(S): 4; .5 INJECTION, POWDER, LYOPHILIZED, FOR SOLUTION INTRAVENOUS at 14:36

## 2022-01-01 RX ADMIN — Medication 1 GRAM(S): at 17:20

## 2022-01-01 RX ADMIN — OXYCODONE HYDROCHLORIDE 7.5 MILLIGRAM(S): 5 TABLET ORAL at 17:34

## 2022-01-01 RX ADMIN — HYDROMORPHONE HYDROCHLORIDE 0.5 MILLIGRAM(S): 2 INJECTION INTRAMUSCULAR; INTRAVENOUS; SUBCUTANEOUS at 14:48

## 2022-01-01 RX ADMIN — HEPARIN SODIUM 5000 UNIT(S): 5000 INJECTION INTRAVENOUS; SUBCUTANEOUS at 13:34

## 2022-01-01 RX ADMIN — PIPERACILLIN AND TAZOBACTAM 25 GRAM(S): 4; .5 INJECTION, POWDER, LYOPHILIZED, FOR SOLUTION INTRAVENOUS at 00:33

## 2022-01-01 RX ADMIN — OXYCODONE HYDROCHLORIDE 7.5 MILLIGRAM(S): 5 TABLET ORAL at 04:37

## 2022-01-01 RX ADMIN — MIDAZOLAM HYDROCHLORIDE 2 MILLIGRAM(S): 1 INJECTION, SOLUTION INTRAMUSCULAR; INTRAVENOUS at 05:52

## 2022-01-01 RX ADMIN — Medication 600 MILLIGRAM(S): at 17:20

## 2022-01-01 RX ADMIN — BUMETANIDE 10 MG/HR: 0.25 INJECTION INTRAMUSCULAR; INTRAVENOUS at 18:02

## 2022-01-01 RX ADMIN — OXYCODONE HYDROCHLORIDE 15 MILLIGRAM(S): 5 TABLET ORAL at 09:47

## 2022-01-01 RX ADMIN — MORPHINE SULFATE 2 MILLIGRAM(S): 50 CAPSULE, EXTENDED RELEASE ORAL at 03:46

## 2022-01-01 RX ADMIN — OXYCODONE HYDROCHLORIDE 7.5 MILLIGRAM(S): 5 TABLET ORAL at 04:07

## 2022-01-01 RX ADMIN — Medication 1 GRAM(S): at 06:13

## 2022-01-01 RX ADMIN — CHLORHEXIDINE GLUCONATE 1 APPLICATION(S): 213 SOLUTION TOPICAL at 06:01

## 2022-01-01 RX ADMIN — PANTOPRAZOLE SODIUM 40 MILLIGRAM(S): 20 TABLET, DELAYED RELEASE ORAL at 05:57

## 2022-01-01 RX ADMIN — CHLORHEXIDINE GLUCONATE 1 APPLICATION(S): 213 SOLUTION TOPICAL at 05:27

## 2022-01-01 RX ADMIN — OXYCODONE HYDROCHLORIDE 7.5 MILLIGRAM(S): 5 TABLET ORAL at 12:28

## 2022-01-01 RX ADMIN — PANTOPRAZOLE SODIUM 40 MILLIGRAM(S): 20 TABLET, DELAYED RELEASE ORAL at 17:03

## 2022-01-01 RX ADMIN — Medication 1 TABLET(S): at 07:58

## 2022-01-01 RX ADMIN — OXYCODONE HYDROCHLORIDE 15 MILLIGRAM(S): 5 TABLET ORAL at 12:21

## 2022-01-01 RX ADMIN — BUMETANIDE 2 MILLIGRAM(S): 0.25 INJECTION INTRAMUSCULAR; INTRAVENOUS at 10:00

## 2022-01-01 RX ADMIN — Medication 40 MILLIEQUIVALENT(S): at 13:29

## 2022-01-01 RX ADMIN — PIPERACILLIN AND TAZOBACTAM 25 GRAM(S): 4; .5 INJECTION, POWDER, LYOPHILIZED, FOR SOLUTION INTRAVENOUS at 23:00

## 2022-01-01 RX ADMIN — Medication 40 MILLIEQUIVALENT(S): at 10:27

## 2022-01-01 RX ADMIN — CYCLOBENZAPRINE HYDROCHLORIDE 5 MILLIGRAM(S): 10 TABLET, FILM COATED ORAL at 11:12

## 2022-01-01 RX ADMIN — PANTOPRAZOLE SODIUM 40 MILLIGRAM(S): 20 TABLET, DELAYED RELEASE ORAL at 18:19

## 2022-01-01 RX ADMIN — BUMETANIDE 10 MG/HR: 0.25 INJECTION INTRAMUSCULAR; INTRAVENOUS at 19:02

## 2022-01-01 RX ADMIN — PIPERACILLIN AND TAZOBACTAM 25 GRAM(S): 4; .5 INJECTION, POWDER, LYOPHILIZED, FOR SOLUTION INTRAVENOUS at 05:17

## 2022-01-01 RX ADMIN — ERYTHROPOIETIN 20000 UNIT(S): 10000 INJECTION, SOLUTION INTRAVENOUS; SUBCUTANEOUS at 13:33

## 2022-01-01 RX ADMIN — PIPERACILLIN AND TAZOBACTAM 25 GRAM(S): 4; .5 INJECTION, POWDER, LYOPHILIZED, FOR SOLUTION INTRAVENOUS at 00:58

## 2022-01-01 RX ADMIN — MIDODRINE HYDROCHLORIDE 10 MILLIGRAM(S): 2.5 TABLET ORAL at 05:56

## 2022-01-01 RX ADMIN — Medication 30 MILLIGRAM(S): at 06:14

## 2022-01-01 RX ADMIN — LACTULOSE 20 GRAM(S): 10 SOLUTION ORAL at 17:47

## 2022-01-01 RX ADMIN — CHLORHEXIDINE GLUCONATE 1 APPLICATION(S): 213 SOLUTION TOPICAL at 06:00

## 2022-01-01 RX ADMIN — SODIUM CHLORIDE 100 MILLILITER(S): 9 INJECTION, SOLUTION INTRAVENOUS at 20:05

## 2022-01-01 RX ADMIN — Medication 75 MEQ/KG/HR: at 06:21

## 2022-01-01 RX ADMIN — HYDROMORPHONE HYDROCHLORIDE 1 MILLIGRAM(S): 2 INJECTION INTRAMUSCULAR; INTRAVENOUS; SUBCUTANEOUS at 06:37

## 2022-01-01 RX ADMIN — CHLORHEXIDINE GLUCONATE 1 APPLICATION(S): 213 SOLUTION TOPICAL at 05:09

## 2022-01-01 RX ADMIN — Medication 1 GRAM(S): at 17:10

## 2022-01-01 RX ADMIN — HYDROMORPHONE HYDROCHLORIDE 1 MILLIGRAM(S): 2 INJECTION INTRAMUSCULAR; INTRAVENOUS; SUBCUTANEOUS at 11:56

## 2022-01-01 RX ADMIN — HEPARIN SODIUM 5000 UNIT(S): 5000 INJECTION INTRAVENOUS; SUBCUTANEOUS at 21:31

## 2022-01-01 RX ADMIN — PIPERACILLIN AND TAZOBACTAM 25 GRAM(S): 4; .5 INJECTION, POWDER, LYOPHILIZED, FOR SOLUTION INTRAVENOUS at 12:03

## 2022-01-01 RX ADMIN — MORPHINE SULFATE 2 MILLIGRAM(S): 50 CAPSULE, EXTENDED RELEASE ORAL at 12:55

## 2022-01-01 RX ADMIN — HEPARIN SODIUM 5000 UNIT(S): 5000 INJECTION INTRAVENOUS; SUBCUTANEOUS at 21:36

## 2022-01-01 RX ADMIN — OXYCODONE HYDROCHLORIDE 15 MILLIGRAM(S): 5 TABLET ORAL at 03:40

## 2022-01-01 RX ADMIN — PANTOPRAZOLE SODIUM 40 MILLIGRAM(S): 20 TABLET, DELAYED RELEASE ORAL at 06:13

## 2022-01-01 RX ADMIN — PIPERACILLIN AND TAZOBACTAM 25 GRAM(S): 4; .5 INJECTION, POWDER, LYOPHILIZED, FOR SOLUTION INTRAVENOUS at 23:38

## 2022-01-01 RX ADMIN — PIPERACILLIN AND TAZOBACTAM 25 GRAM(S): 4; .5 INJECTION, POWDER, LYOPHILIZED, FOR SOLUTION INTRAVENOUS at 23:40

## 2022-01-01 RX ADMIN — PANTOPRAZOLE SODIUM 40 MILLIGRAM(S): 20 TABLET, DELAYED RELEASE ORAL at 06:19

## 2022-01-01 RX ADMIN — PIPERACILLIN AND TAZOBACTAM 25 GRAM(S): 4; .5 INJECTION, POWDER, LYOPHILIZED, FOR SOLUTION INTRAVENOUS at 17:03

## 2022-01-01 RX ADMIN — ERYTHROPOIETIN 10000 UNIT(S): 10000 INJECTION, SOLUTION INTRAVENOUS; SUBCUTANEOUS at 11:45

## 2022-01-01 RX ADMIN — LACTULOSE 20 GRAM(S): 10 SOLUTION ORAL at 01:14

## 2022-01-01 RX ADMIN — HEPARIN SODIUM 5000 UNIT(S): 5000 INJECTION INTRAVENOUS; SUBCUTANEOUS at 05:26

## 2022-01-01 RX ADMIN — OXYCODONE HYDROCHLORIDE 15 MILLIGRAM(S): 5 TABLET ORAL at 11:13

## 2022-01-01 RX ADMIN — PIPERACILLIN AND TAZOBACTAM 25 GRAM(S): 4; .5 INJECTION, POWDER, LYOPHILIZED, FOR SOLUTION INTRAVENOUS at 12:05

## 2022-01-01 RX ADMIN — PIPERACILLIN AND TAZOBACTAM 25 GRAM(S): 4; .5 INJECTION, POWDER, LYOPHILIZED, FOR SOLUTION INTRAVENOUS at 17:27

## 2022-01-01 RX ADMIN — LACTULOSE 20 GRAM(S): 10 SOLUTION ORAL at 00:11

## 2022-01-01 RX ADMIN — PIPERACILLIN AND TAZOBACTAM 25 GRAM(S): 4; .5 INJECTION, POWDER, LYOPHILIZED, FOR SOLUTION INTRAVENOUS at 00:28

## 2022-01-01 RX ADMIN — IRON SUCROSE 200 MILLIGRAM(S): 20 INJECTION, SOLUTION INTRAVENOUS at 12:06

## 2022-01-01 RX ADMIN — IRON SUCROSE 110 MILLIGRAM(S): 20 INJECTION, SOLUTION INTRAVENOUS at 11:45

## 2022-01-01 RX ADMIN — Medication 40 MILLIEQUIVALENT(S): at 06:00

## 2022-01-01 RX ADMIN — PANTOPRAZOLE SODIUM 40 MILLIGRAM(S): 20 TABLET, DELAYED RELEASE ORAL at 06:08

## 2022-01-01 RX ADMIN — HEPARIN SODIUM 5000 UNIT(S): 5000 INJECTION INTRAVENOUS; SUBCUTANEOUS at 13:26

## 2022-01-01 RX ADMIN — Medication 1 GRAM(S): at 02:30

## 2022-01-01 RX ADMIN — HEPARIN SODIUM 5000 UNIT(S): 5000 INJECTION INTRAVENOUS; SUBCUTANEOUS at 22:55

## 2022-01-01 RX ADMIN — CHLORHEXIDINE GLUCONATE 1 APPLICATION(S): 213 SOLUTION TOPICAL at 04:06

## 2022-01-01 RX ADMIN — PIPERACILLIN AND TAZOBACTAM 25 GRAM(S): 4; .5 INJECTION, POWDER, LYOPHILIZED, FOR SOLUTION INTRAVENOUS at 23:52

## 2022-01-01 RX ADMIN — OXYCODONE HYDROCHLORIDE 15 MILLIGRAM(S): 5 TABLET ORAL at 03:02

## 2022-01-01 RX ADMIN — Medication 1 TABLET(S): at 05:42

## 2022-01-01 RX ADMIN — Medication 1 GRAM(S): at 06:10

## 2022-01-01 RX ADMIN — OXYCODONE HYDROCHLORIDE 15 MILLIGRAM(S): 5 TABLET ORAL at 01:14

## 2022-01-01 RX ADMIN — PANTOPRAZOLE SODIUM 40 MILLIGRAM(S): 20 TABLET, DELAYED RELEASE ORAL at 18:07

## 2022-01-01 RX ADMIN — PIPERACILLIN AND TAZOBACTAM 25 GRAM(S): 4; .5 INJECTION, POWDER, LYOPHILIZED, FOR SOLUTION INTRAVENOUS at 17:20

## 2022-01-01 RX ADMIN — LACTULOSE 20 GRAM(S): 10 SOLUTION ORAL at 05:34

## 2022-01-01 RX ADMIN — Medication 40 MILLIEQUIVALENT(S): at 21:22

## 2022-01-01 RX ADMIN — HYDROMORPHONE HYDROCHLORIDE 1 MILLIGRAM(S): 2 INJECTION INTRAMUSCULAR; INTRAVENOUS; SUBCUTANEOUS at 16:50

## 2022-01-01 RX ADMIN — Medication 5 MILLIGRAM(S): at 21:34

## 2022-01-01 RX ADMIN — PIPERACILLIN AND TAZOBACTAM 25 GRAM(S): 4; .5 INJECTION, POWDER, LYOPHILIZED, FOR SOLUTION INTRAVENOUS at 12:31

## 2022-01-01 RX ADMIN — PIPERACILLIN AND TAZOBACTAM 25 GRAM(S): 4; .5 INJECTION, POWDER, LYOPHILIZED, FOR SOLUTION INTRAVENOUS at 11:38

## 2022-01-01 RX ADMIN — MIDODRINE HYDROCHLORIDE 10 MILLIGRAM(S): 2.5 TABLET ORAL at 05:59

## 2022-01-01 RX ADMIN — Medication 1 CAPSULE(S): at 12:20

## 2022-01-01 RX ADMIN — Medication 1 GRAM(S): at 00:16

## 2022-01-01 RX ADMIN — Medication 100 GRAM(S): at 03:36

## 2022-01-01 RX ADMIN — SODIUM CHLORIDE 75 MILLILITER(S): 9 INJECTION, SOLUTION INTRAVENOUS at 05:42

## 2022-01-01 RX ADMIN — Medication 1 TABLET(S): at 07:53

## 2022-01-01 RX ADMIN — HEPARIN SODIUM 5000 UNIT(S): 5000 INJECTION INTRAVENOUS; SUBCUTANEOUS at 21:33

## 2022-01-01 RX ADMIN — Medication 3.97 MICROGRAM(S)/KG/MIN: at 16:24

## 2022-01-01 RX ADMIN — CHLORHEXIDINE GLUCONATE 1 APPLICATION(S): 213 SOLUTION TOPICAL at 05:18

## 2022-01-01 RX ADMIN — SODIUM CHLORIDE 75 MILLILITER(S): 9 INJECTION, SOLUTION INTRAVENOUS at 00:14

## 2022-01-01 RX ADMIN — MORPHINE SULFATE 2 MILLIGRAM(S): 50 CAPSULE, EXTENDED RELEASE ORAL at 11:13

## 2022-01-01 RX ADMIN — MORPHINE SULFATE 1 MILLIGRAM(S): 50 CAPSULE, EXTENDED RELEASE ORAL at 06:41

## 2022-01-01 RX ADMIN — Medication 30 MILLIGRAM(S): at 15:50

## 2022-01-01 RX ADMIN — OXYCODONE HYDROCHLORIDE 15 MILLIGRAM(S): 5 TABLET ORAL at 22:55

## 2022-01-01 RX ADMIN — Medication 500000 UNIT(S): at 06:10

## 2022-01-01 RX ADMIN — BUMETANIDE 10 MG/HR: 0.25 INJECTION INTRAMUSCULAR; INTRAVENOUS at 10:00

## 2022-01-01 RX ADMIN — PIPERACILLIN AND TAZOBACTAM 25 GRAM(S): 4; .5 INJECTION, POWDER, LYOPHILIZED, FOR SOLUTION INTRAVENOUS at 00:41

## 2022-01-01 RX ADMIN — MIDODRINE HYDROCHLORIDE 10 MILLIGRAM(S): 2.5 TABLET ORAL at 13:11

## 2022-01-01 RX ADMIN — PANTOPRAZOLE SODIUM 40 MILLIGRAM(S): 20 TABLET, DELAYED RELEASE ORAL at 07:13

## 2022-01-01 RX ADMIN — PIPERACILLIN AND TAZOBACTAM 25 GRAM(S): 4; .5 INJECTION, POWDER, LYOPHILIZED, FOR SOLUTION INTRAVENOUS at 13:15

## 2022-01-01 RX ADMIN — OXYCODONE HYDROCHLORIDE 15 MILLIGRAM(S): 5 TABLET ORAL at 17:19

## 2022-01-01 RX ADMIN — Medication 1 GRAM(S): at 23:14

## 2022-01-01 RX ADMIN — PANTOPRAZOLE SODIUM 40 MILLIGRAM(S): 20 TABLET, DELAYED RELEASE ORAL at 17:49

## 2022-01-01 RX ADMIN — Medication 50 MILLILITER(S): at 06:08

## 2022-01-01 RX ADMIN — PANTOPRAZOLE SODIUM 40 MILLIGRAM(S): 20 TABLET, DELAYED RELEASE ORAL at 05:42

## 2022-01-01 RX ADMIN — MIDODRINE HYDROCHLORIDE 10 MILLIGRAM(S): 2.5 TABLET ORAL at 21:37

## 2022-01-01 RX ADMIN — OXYCODONE HYDROCHLORIDE 7.5 MILLIGRAM(S): 5 TABLET ORAL at 11:19

## 2022-01-01 RX ADMIN — POLYETHYLENE GLYCOL 3350 17 GRAM(S): 17 POWDER, FOR SOLUTION ORAL at 11:32

## 2022-01-01 RX ADMIN — OXYCODONE HYDROCHLORIDE 7.5 MILLIGRAM(S): 5 TABLET ORAL at 22:56

## 2022-01-01 RX ADMIN — OXYCODONE HYDROCHLORIDE 7.5 MILLIGRAM(S): 5 TABLET ORAL at 14:36

## 2022-01-01 RX ADMIN — Medication 100 MILLIEQUIVALENT(S): at 23:41

## 2022-01-01 RX ADMIN — HYDROMORPHONE HYDROCHLORIDE 1 MILLIGRAM(S): 2 INJECTION INTRAMUSCULAR; INTRAVENOUS; SUBCUTANEOUS at 00:00

## 2022-01-01 RX ADMIN — SODIUM ZIRCONIUM CYCLOSILICATE 10 GRAM(S): 10 POWDER, FOR SUSPENSION ORAL at 04:14

## 2022-01-01 RX ADMIN — Medication 1 TABLET(S): at 15:59

## 2022-01-01 RX ADMIN — HEPARIN SODIUM 5000 UNIT(S): 5000 INJECTION INTRAVENOUS; SUBCUTANEOUS at 07:05

## 2022-01-01 RX ADMIN — OXYCODONE HYDROCHLORIDE 7.5 MILLIGRAM(S): 5 TABLET ORAL at 03:18

## 2022-01-01 RX ADMIN — OXYCODONE HYDROCHLORIDE 15 MILLIGRAM(S): 5 TABLET ORAL at 09:43

## 2022-01-01 RX ADMIN — Medication 1 GRAM(S): at 13:34

## 2022-01-01 RX ADMIN — PIPERACILLIN AND TAZOBACTAM 25 GRAM(S): 4; .5 INJECTION, POWDER, LYOPHILIZED, FOR SOLUTION INTRAVENOUS at 23:18

## 2022-01-01 RX ADMIN — HEPARIN SODIUM 5000 UNIT(S): 5000 INJECTION INTRAVENOUS; SUBCUTANEOUS at 07:12

## 2022-01-01 RX ADMIN — SODIUM ZIRCONIUM CYCLOSILICATE 10 GRAM(S): 10 POWDER, FOR SUSPENSION ORAL at 11:45

## 2022-01-01 RX ADMIN — ONDANSETRON 4 MILLIGRAM(S): 8 TABLET, FILM COATED ORAL at 00:39

## 2022-01-01 RX ADMIN — TRAMADOL HYDROCHLORIDE 25 MILLIGRAM(S): 50 TABLET ORAL at 10:53

## 2022-01-01 RX ADMIN — CHLORHEXIDINE GLUCONATE 1 APPLICATION(S): 213 SOLUTION TOPICAL at 05:35

## 2022-01-01 RX ADMIN — OXYCODONE HYDROCHLORIDE 7.5 MILLIGRAM(S): 5 TABLET ORAL at 02:30

## 2022-01-01 RX ADMIN — HEPARIN SODIUM 5000 UNIT(S): 5000 INJECTION INTRAVENOUS; SUBCUTANEOUS at 22:09

## 2022-01-01 RX ADMIN — OXYCODONE HYDROCHLORIDE 7.5 MILLIGRAM(S): 5 TABLET ORAL at 02:24

## 2022-01-01 RX ADMIN — PIPERACILLIN AND TAZOBACTAM 25 GRAM(S): 4; .5 INJECTION, POWDER, LYOPHILIZED, FOR SOLUTION INTRAVENOUS at 23:26

## 2022-01-01 RX ADMIN — DEXMEDETOMIDINE HYDROCHLORIDE IN 0.9% SODIUM CHLORIDE 5.29 MICROGRAM(S)/KG/HR: 4 INJECTION INTRAVENOUS at 13:47

## 2022-01-01 RX ADMIN — PANTOPRAZOLE SODIUM 40 MILLIGRAM(S): 20 TABLET, DELAYED RELEASE ORAL at 12:08

## 2022-01-01 RX ADMIN — PIPERACILLIN AND TAZOBACTAM 25 GRAM(S): 4; .5 INJECTION, POWDER, LYOPHILIZED, FOR SOLUTION INTRAVENOUS at 23:14

## 2022-01-01 RX ADMIN — Medication 100 GRAM(S): at 04:44

## 2022-01-01 RX ADMIN — OXYCODONE HYDROCHLORIDE 7.5 MILLIGRAM(S): 5 TABLET ORAL at 09:01

## 2022-01-01 RX ADMIN — SODIUM ZIRCONIUM CYCLOSILICATE 10 GRAM(S): 10 POWDER, FOR SUSPENSION ORAL at 20:24

## 2022-01-01 RX ADMIN — PANTOPRAZOLE SODIUM 40 MILLIGRAM(S): 20 TABLET, DELAYED RELEASE ORAL at 11:46

## 2022-01-01 RX ADMIN — PIPERACILLIN AND TAZOBACTAM 25 GRAM(S): 4; .5 INJECTION, POWDER, LYOPHILIZED, FOR SOLUTION INTRAVENOUS at 11:12

## 2022-01-01 RX ADMIN — Medication 100 GRAM(S): at 10:28

## 2022-01-01 RX ADMIN — Medication 40 MILLIEQUIVALENT(S): at 11:00

## 2022-01-01 RX ADMIN — OXYCODONE HYDROCHLORIDE 15 MILLIGRAM(S): 5 TABLET ORAL at 22:50

## 2022-01-01 RX ADMIN — PANTOPRAZOLE SODIUM 40 MILLIGRAM(S): 20 TABLET, DELAYED RELEASE ORAL at 06:32

## 2022-01-01 RX ADMIN — OXYCODONE HYDROCHLORIDE 15 MILLIGRAM(S): 5 TABLET ORAL at 18:07

## 2022-01-01 RX ADMIN — PIPERACILLIN AND TAZOBACTAM 25 GRAM(S): 4; .5 INJECTION, POWDER, LYOPHILIZED, FOR SOLUTION INTRAVENOUS at 06:00

## 2022-01-01 RX ADMIN — Medication 1 TABLET(S): at 10:47

## 2022-01-01 RX ADMIN — PIPERACILLIN AND TAZOBACTAM 25 GRAM(S): 4; .5 INJECTION, POWDER, LYOPHILIZED, FOR SOLUTION INTRAVENOUS at 17:58

## 2022-01-01 RX ADMIN — OXYCODONE HYDROCHLORIDE 15 MILLIGRAM(S): 5 TABLET ORAL at 19:21

## 2022-01-01 RX ADMIN — OXYCODONE HYDROCHLORIDE 7.5 MILLIGRAM(S): 5 TABLET ORAL at 09:31

## 2022-01-01 RX ADMIN — MIDODRINE HYDROCHLORIDE 10 MILLIGRAM(S): 2.5 TABLET ORAL at 13:56

## 2022-01-01 RX ADMIN — HEPARIN SODIUM 5000 UNIT(S): 5000 INJECTION INTRAVENOUS; SUBCUTANEOUS at 21:26

## 2022-01-01 RX ADMIN — Medication 1 TABLET(S): at 14:04

## 2022-01-01 RX ADMIN — PANTOPRAZOLE SODIUM 40 MILLIGRAM(S): 20 TABLET, DELAYED RELEASE ORAL at 07:58

## 2022-01-01 RX ADMIN — OXYCODONE HYDROCHLORIDE 15 MILLIGRAM(S): 5 TABLET ORAL at 06:24

## 2022-01-01 RX ADMIN — HEPARIN SODIUM 5000 UNIT(S): 5000 INJECTION INTRAVENOUS; SUBCUTANEOUS at 13:59

## 2022-01-01 RX ADMIN — SENNA PLUS 2 TABLET(S): 8.6 TABLET ORAL at 21:33

## 2022-01-01 RX ADMIN — OXYCODONE HYDROCHLORIDE 7.5 MILLIGRAM(S): 5 TABLET ORAL at 14:00

## 2022-01-01 RX ADMIN — OXYCODONE HYDROCHLORIDE 15 MILLIGRAM(S): 5 TABLET ORAL at 01:03

## 2022-01-01 RX ADMIN — SODIUM CHLORIDE 75 MILLILITER(S): 9 INJECTION, SOLUTION INTRAVENOUS at 11:10

## 2022-01-01 RX ADMIN — HEPARIN SODIUM 5000 UNIT(S): 5000 INJECTION INTRAVENOUS; SUBCUTANEOUS at 07:44

## 2022-01-01 RX ADMIN — CHLORHEXIDINE GLUCONATE 15 MILLILITER(S): 213 SOLUTION TOPICAL at 18:37

## 2022-01-01 RX ADMIN — PIPERACILLIN AND TAZOBACTAM 25 GRAM(S): 4; .5 INJECTION, POWDER, LYOPHILIZED, FOR SOLUTION INTRAVENOUS at 06:21

## 2022-01-01 RX ADMIN — OXYCODONE HYDROCHLORIDE 15 MILLIGRAM(S): 5 TABLET ORAL at 03:07

## 2022-01-01 RX ADMIN — OXYCODONE HYDROCHLORIDE 5 MILLIGRAM(S): 5 TABLET ORAL at 04:31

## 2022-01-01 RX ADMIN — HEPARIN SODIUM 5000 UNIT(S): 5000 INJECTION INTRAVENOUS; SUBCUTANEOUS at 17:14

## 2022-01-01 RX ADMIN — PANTOPRAZOLE SODIUM 40 MILLIGRAM(S): 20 TABLET, DELAYED RELEASE ORAL at 06:09

## 2022-01-01 RX ADMIN — MORPHINE SULFATE 1 MILLIGRAM(S): 50 CAPSULE, EXTENDED RELEASE ORAL at 05:55

## 2022-01-01 RX ADMIN — MIDAZOLAM HYDROCHLORIDE 2 MILLIGRAM(S): 1 INJECTION, SOLUTION INTRAMUSCULAR; INTRAVENOUS at 10:52

## 2022-01-01 RX ADMIN — Medication 50 MILLIEQUIVALENT(S): at 18:06

## 2022-01-01 RX ADMIN — CYCLOBENZAPRINE HYDROCHLORIDE 5 MILLIGRAM(S): 10 TABLET, FILM COATED ORAL at 07:59

## 2022-01-01 RX ADMIN — OXYCODONE HYDROCHLORIDE 15 MILLIGRAM(S): 5 TABLET ORAL at 15:15

## 2022-01-01 RX ADMIN — MORPHINE SULFATE 2 MILLIGRAM(S): 50 CAPSULE, EXTENDED RELEASE ORAL at 20:28

## 2022-01-01 RX ADMIN — PANTOPRAZOLE SODIUM 40 MILLIGRAM(S): 20 TABLET, DELAYED RELEASE ORAL at 17:34

## 2022-01-01 RX ADMIN — Medication 500000 UNIT(S): at 21:35

## 2022-01-01 RX ADMIN — PANTOPRAZOLE SODIUM 40 MILLIGRAM(S): 20 TABLET, DELAYED RELEASE ORAL at 06:21

## 2022-01-01 RX ADMIN — FENTANYL CITRATE 50 MICROGRAM(S): 50 INJECTION INTRAVENOUS at 16:09

## 2022-01-01 RX ADMIN — PIPERACILLIN AND TAZOBACTAM 25 GRAM(S): 4; .5 INJECTION, POWDER, LYOPHILIZED, FOR SOLUTION INTRAVENOUS at 23:51

## 2022-01-01 RX ADMIN — HEPARIN SODIUM 5000 UNIT(S): 5000 INJECTION INTRAVENOUS; SUBCUTANEOUS at 14:54

## 2022-01-01 RX ADMIN — LACTULOSE 20 GRAM(S): 10 SOLUTION ORAL at 11:38

## 2022-01-01 RX ADMIN — PANTOPRAZOLE SODIUM 40 MILLIGRAM(S): 20 TABLET, DELAYED RELEASE ORAL at 12:31

## 2022-01-01 RX ADMIN — HYDROMORPHONE HYDROCHLORIDE 0.5 MILLIGRAM(S): 2 INJECTION INTRAMUSCULAR; INTRAVENOUS; SUBCUTANEOUS at 04:30

## 2022-01-01 RX ADMIN — HYDROMORPHONE HYDROCHLORIDE 1 MILLIGRAM(S): 2 INJECTION INTRAMUSCULAR; INTRAVENOUS; SUBCUTANEOUS at 15:10

## 2022-01-01 RX ADMIN — PIPERACILLIN AND TAZOBACTAM 25 GRAM(S): 4; .5 INJECTION, POWDER, LYOPHILIZED, FOR SOLUTION INTRAVENOUS at 00:59

## 2022-01-01 RX ADMIN — SODIUM CHLORIDE 100 MILLILITER(S): 9 INJECTION, SOLUTION INTRAVENOUS at 23:40

## 2022-01-01 RX ADMIN — HEPARIN SODIUM 5000 UNIT(S): 5000 INJECTION INTRAVENOUS; SUBCUTANEOUS at 06:59

## 2022-01-01 RX ADMIN — MIDODRINE HYDROCHLORIDE 10 MILLIGRAM(S): 2.5 TABLET ORAL at 21:29

## 2022-01-01 RX ADMIN — PIPERACILLIN AND TAZOBACTAM 25 GRAM(S): 4; .5 INJECTION, POWDER, LYOPHILIZED, FOR SOLUTION INTRAVENOUS at 00:39

## 2022-01-01 RX ADMIN — PIPERACILLIN AND TAZOBACTAM 25 GRAM(S): 4; .5 INJECTION, POWDER, LYOPHILIZED, FOR SOLUTION INTRAVENOUS at 13:22

## 2022-01-01 RX ADMIN — Medication 1 TABLET(S): at 06:00

## 2022-01-01 RX ADMIN — MORPHINE SULFATE 2 MILLIGRAM(S): 50 CAPSULE, EXTENDED RELEASE ORAL at 18:08

## 2022-01-01 RX ADMIN — PIPERACILLIN AND TAZOBACTAM 200 GRAM(S): 4; .5 INJECTION, POWDER, LYOPHILIZED, FOR SOLUTION INTRAVENOUS at 12:01

## 2022-01-01 RX ADMIN — PIPERACILLIN AND TAZOBACTAM 25 GRAM(S): 4; .5 INJECTION, POWDER, LYOPHILIZED, FOR SOLUTION INTRAVENOUS at 02:08

## 2022-01-01 RX ADMIN — Medication 50 MILLIEQUIVALENT(S): at 22:05

## 2022-01-01 RX ADMIN — Medication 1 TABLET(S): at 22:50

## 2022-01-01 RX ADMIN — FENTANYL CITRATE 50 MICROGRAM(S): 50 INJECTION INTRAVENOUS at 15:53

## 2022-01-01 RX ADMIN — MORPHINE SULFATE 2 MILLIGRAM(S): 50 CAPSULE, EXTENDED RELEASE ORAL at 19:13

## 2022-01-01 RX ADMIN — SODIUM CHLORIDE 100 MILLILITER(S): 9 INJECTION, SOLUTION INTRAVENOUS at 11:00

## 2022-01-01 RX ADMIN — HYDROMORPHONE HYDROCHLORIDE 1 MILLIGRAM(S): 2 INJECTION INTRAMUSCULAR; INTRAVENOUS; SUBCUTANEOUS at 12:41

## 2022-01-01 RX ADMIN — PIPERACILLIN AND TAZOBACTAM 25 GRAM(S): 4; .5 INJECTION, POWDER, LYOPHILIZED, FOR SOLUTION INTRAVENOUS at 00:30

## 2022-01-01 RX ADMIN — BUMETANIDE 10 MG/HR: 0.25 INJECTION INTRAMUSCULAR; INTRAVENOUS at 22:43

## 2022-01-01 RX ADMIN — Medication 1 TABLET(S): at 02:00

## 2022-01-01 RX ADMIN — OXYCODONE HYDROCHLORIDE 15 MILLIGRAM(S): 5 TABLET ORAL at 12:51

## 2022-01-01 RX ADMIN — OXYCODONE HYDROCHLORIDE 15 MILLIGRAM(S): 5 TABLET ORAL at 11:47

## 2022-01-01 RX ADMIN — HEPARIN SODIUM 5000 UNIT(S): 5000 INJECTION INTRAVENOUS; SUBCUTANEOUS at 17:02

## 2022-01-01 RX ADMIN — OXYCODONE HYDROCHLORIDE 7.5 MILLIGRAM(S): 5 TABLET ORAL at 13:56

## 2022-01-01 RX ADMIN — HYDROMORPHONE HYDROCHLORIDE 0.5 MILLIGRAM(S): 2 INJECTION INTRAMUSCULAR; INTRAVENOUS; SUBCUTANEOUS at 15:10

## 2022-01-01 RX ADMIN — HEPARIN SODIUM 5000 UNIT(S): 5000 INJECTION INTRAVENOUS; SUBCUTANEOUS at 22:13

## 2022-01-01 RX ADMIN — DEXMEDETOMIDINE HYDROCHLORIDE IN 0.9% SODIUM CHLORIDE 5.29 MICROGRAM(S)/KG/HR: 4 INJECTION INTRAVENOUS at 18:53

## 2022-01-01 RX ADMIN — DIATRIZOATE MEGLUMINE 30 MILLILITER(S): 180 INJECTION, SOLUTION INTRAVESICAL at 15:52

## 2022-01-01 RX ADMIN — OXYCODONE HYDROCHLORIDE 15 MILLIGRAM(S): 5 TABLET ORAL at 14:48

## 2022-01-01 RX ADMIN — CHLORHEXIDINE GLUCONATE 15 MILLILITER(S): 213 SOLUTION TOPICAL at 17:18

## 2022-01-01 RX ADMIN — PANTOPRAZOLE SODIUM 40 MILLIGRAM(S): 20 TABLET, DELAYED RELEASE ORAL at 18:08

## 2022-01-01 RX ADMIN — Medication 1 CAPSULE(S): at 17:47

## 2022-01-01 RX ADMIN — PIPERACILLIN AND TAZOBACTAM 25 GRAM(S): 4; .5 INJECTION, POWDER, LYOPHILIZED, FOR SOLUTION INTRAVENOUS at 12:16

## 2022-01-01 RX ADMIN — HEPARIN SODIUM 5000 UNIT(S): 5000 INJECTION INTRAVENOUS; SUBCUTANEOUS at 22:36

## 2022-01-01 RX ADMIN — PIPERACILLIN AND TAZOBACTAM 25 GRAM(S): 4; .5 INJECTION, POWDER, LYOPHILIZED, FOR SOLUTION INTRAVENOUS at 05:24

## 2022-01-01 RX ADMIN — HEPARIN SODIUM 5000 UNIT(S): 5000 INJECTION INTRAVENOUS; SUBCUTANEOUS at 14:21

## 2022-01-01 RX ADMIN — PANTOPRAZOLE SODIUM 40 MILLIGRAM(S): 20 TABLET, DELAYED RELEASE ORAL at 06:05

## 2022-01-01 RX ADMIN — PANTOPRAZOLE SODIUM 40 MILLIGRAM(S): 20 TABLET, DELAYED RELEASE ORAL at 17:47

## 2022-01-01 RX ADMIN — HYDROMORPHONE HYDROCHLORIDE 1 MILLIGRAM(S): 2 INJECTION INTRAMUSCULAR; INTRAVENOUS; SUBCUTANEOUS at 02:45

## 2022-01-01 RX ADMIN — LACTULOSE 20 GRAM(S): 10 SOLUTION ORAL at 06:08

## 2022-01-01 RX ADMIN — PIPERACILLIN AND TAZOBACTAM 25 GRAM(S): 4; .5 INJECTION, POWDER, LYOPHILIZED, FOR SOLUTION INTRAVENOUS at 05:26

## 2022-01-01 RX ADMIN — HYDROMORPHONE HYDROCHLORIDE 1 MILLIGRAM(S): 2 INJECTION INTRAMUSCULAR; INTRAVENOUS; SUBCUTANEOUS at 08:07

## 2022-01-01 RX ADMIN — LACTULOSE 20 GRAM(S): 10 SOLUTION ORAL at 11:31

## 2022-01-01 RX ADMIN — HEPARIN SODIUM 5000 UNIT(S): 5000 INJECTION INTRAVENOUS; SUBCUTANEOUS at 06:09

## 2022-01-01 RX ADMIN — LACTULOSE 20 GRAM(S): 10 SOLUTION ORAL at 01:23

## 2022-01-01 RX ADMIN — HEPARIN SODIUM 5000 UNIT(S): 5000 INJECTION INTRAVENOUS; SUBCUTANEOUS at 23:10

## 2022-01-01 RX ADMIN — OXYCODONE HYDROCHLORIDE 7.5 MILLIGRAM(S): 5 TABLET ORAL at 18:28

## 2022-01-01 RX ADMIN — MORPHINE SULFATE 1 MILLIGRAM(S): 50 CAPSULE, EXTENDED RELEASE ORAL at 07:04

## 2022-01-01 RX ADMIN — LACTULOSE 20 GRAM(S): 10 SOLUTION ORAL at 12:08

## 2022-01-01 RX ADMIN — OXYCODONE HYDROCHLORIDE 7.5 MILLIGRAM(S): 5 TABLET ORAL at 17:58

## 2022-01-01 RX ADMIN — Medication 1 TABLET(S): at 01:58

## 2022-01-01 RX ADMIN — PIPERACILLIN AND TAZOBACTAM 25 GRAM(S): 4; .5 INJECTION, POWDER, LYOPHILIZED, FOR SOLUTION INTRAVENOUS at 05:33

## 2022-01-01 RX ADMIN — Medication 3.4 MICROGRAM(S)/KG/MIN: at 08:37

## 2022-01-01 RX ADMIN — PIPERACILLIN AND TAZOBACTAM 25 GRAM(S): 4; .5 INJECTION, POWDER, LYOPHILIZED, FOR SOLUTION INTRAVENOUS at 13:35

## 2022-01-01 RX ADMIN — DEXMEDETOMIDINE HYDROCHLORIDE IN 0.9% SODIUM CHLORIDE 5.29 MICROGRAM(S)/KG/HR: 4 INJECTION INTRAVENOUS at 06:44

## 2022-01-01 RX ADMIN — OXYCODONE HYDROCHLORIDE 7.5 MILLIGRAM(S): 5 TABLET ORAL at 13:26

## 2022-01-01 RX ADMIN — PANTOPRAZOLE SODIUM 40 MILLIGRAM(S): 20 TABLET, DELAYED RELEASE ORAL at 11:39

## 2022-01-01 RX ADMIN — OXYCODONE HYDROCHLORIDE 15 MILLIGRAM(S): 5 TABLET ORAL at 06:54

## 2022-01-01 RX ADMIN — CHLORHEXIDINE GLUCONATE 15 MILLILITER(S): 213 SOLUTION TOPICAL at 06:14

## 2022-01-01 RX ADMIN — Medication 1 GRAM(S): at 18:08

## 2022-01-01 RX ADMIN — PIPERACILLIN AND TAZOBACTAM 25 GRAM(S): 4; .5 INJECTION, POWDER, LYOPHILIZED, FOR SOLUTION INTRAVENOUS at 11:32

## 2022-01-01 RX ADMIN — HYDROMORPHONE HYDROCHLORIDE 1 MILLIGRAM(S): 2 INJECTION INTRAMUSCULAR; INTRAVENOUS; SUBCUTANEOUS at 22:55

## 2022-01-01 RX ADMIN — LACTULOSE 20 GRAM(S): 10 SOLUTION ORAL at 05:57

## 2022-01-01 RX ADMIN — Medication 1 TABLET(S): at 17:27

## 2022-01-01 RX ADMIN — ONDANSETRON 4 MILLIGRAM(S): 8 TABLET, FILM COATED ORAL at 00:12

## 2022-01-01 RX ADMIN — PIPERACILLIN AND TAZOBACTAM 25 GRAM(S): 4; .5 INJECTION, POWDER, LYOPHILIZED, FOR SOLUTION INTRAVENOUS at 23:50

## 2022-01-01 RX ADMIN — HYDROMORPHONE HYDROCHLORIDE 0.5 MILLIGRAM(S): 2 INJECTION INTRAMUSCULAR; INTRAVENOUS; SUBCUTANEOUS at 03:39

## 2022-01-01 RX ADMIN — MIDODRINE HYDROCHLORIDE 10 MILLIGRAM(S): 2.5 TABLET ORAL at 22:11

## 2022-01-01 RX ADMIN — PANTOPRAZOLE SODIUM 40 MILLIGRAM(S): 20 TABLET, DELAYED RELEASE ORAL at 17:20

## 2022-01-01 RX ADMIN — HEPARIN SODIUM 5000 UNIT(S): 5000 INJECTION INTRAVENOUS; SUBCUTANEOUS at 18:04

## 2022-01-01 RX ADMIN — OXYCODONE HYDROCHLORIDE 15 MILLIGRAM(S): 5 TABLET ORAL at 02:30

## 2022-01-01 RX ADMIN — HEPARIN SODIUM 5000 UNIT(S): 5000 INJECTION INTRAVENOUS; SUBCUTANEOUS at 14:09

## 2022-01-01 RX ADMIN — HEPARIN SODIUM 5000 UNIT(S): 5000 INJECTION INTRAVENOUS; SUBCUTANEOUS at 21:38

## 2022-01-01 RX ADMIN — POLYETHYLENE GLYCOL 3350 17 GRAM(S): 17 POWDER, FOR SOLUTION ORAL at 13:24

## 2022-01-01 RX ADMIN — OXYCODONE HYDROCHLORIDE 5 MILLIGRAM(S): 5 TABLET ORAL at 22:44

## 2022-01-01 RX ADMIN — PANTOPRAZOLE SODIUM 40 MILLIGRAM(S): 20 TABLET, DELAYED RELEASE ORAL at 11:38

## 2022-01-01 RX ADMIN — OXYCODONE HYDROCHLORIDE 7.5 MILLIGRAM(S): 5 TABLET ORAL at 12:44

## 2022-01-01 RX ADMIN — PIPERACILLIN AND TAZOBACTAM 25 GRAM(S): 4; .5 INJECTION, POWDER, LYOPHILIZED, FOR SOLUTION INTRAVENOUS at 15:58

## 2022-01-01 RX ADMIN — Medication 30 MILLIGRAM(S): at 06:13

## 2022-01-01 RX ADMIN — PANTOPRAZOLE SODIUM 40 MILLIGRAM(S): 20 TABLET, DELAYED RELEASE ORAL at 06:25

## 2022-01-01 RX ADMIN — PANTOPRAZOLE SODIUM 40 MILLIGRAM(S): 20 TABLET, DELAYED RELEASE ORAL at 05:27

## 2022-01-01 RX ADMIN — HEPARIN SODIUM 5000 UNIT(S): 5000 INJECTION INTRAVENOUS; SUBCUTANEOUS at 07:33

## 2022-01-01 RX ADMIN — PIPERACILLIN AND TAZOBACTAM 25 GRAM(S): 4; .5 INJECTION, POWDER, LYOPHILIZED, FOR SOLUTION INTRAVENOUS at 08:04

## 2022-01-01 RX ADMIN — HEPARIN SODIUM 5000 UNIT(S): 5000 INJECTION INTRAVENOUS; SUBCUTANEOUS at 06:13

## 2022-01-01 RX ADMIN — HEPARIN SODIUM 5000 UNIT(S): 5000 INJECTION INTRAVENOUS; SUBCUTANEOUS at 06:41

## 2022-01-01 RX ADMIN — AZITHROMYCIN 255 MILLIGRAM(S): 500 TABLET, FILM COATED ORAL at 05:37

## 2022-01-01 RX ADMIN — Medication 1 TABLET(S): at 16:35

## 2022-01-01 RX ADMIN — OXYCODONE HYDROCHLORIDE 15 MILLIGRAM(S): 5 TABLET ORAL at 10:56

## 2022-01-01 RX ADMIN — Medication 100 GRAM(S): at 19:12

## 2022-01-01 RX ADMIN — MIDODRINE HYDROCHLORIDE 10 MILLIGRAM(S): 2.5 TABLET ORAL at 14:37

## 2022-01-01 RX ADMIN — SODIUM CHLORIDE 75 MILLILITER(S): 9 INJECTION, SOLUTION INTRAVENOUS at 19:27

## 2022-01-01 RX ADMIN — AZITHROMYCIN 255 MILLIGRAM(S): 500 TABLET, FILM COATED ORAL at 07:00

## 2022-01-01 RX ADMIN — Medication 1 GRAM(S): at 17:42

## 2022-01-01 RX ADMIN — MORPHINE SULFATE 2 MILLIGRAM(S): 50 CAPSULE, EXTENDED RELEASE ORAL at 04:14

## 2022-01-01 RX ADMIN — HYDROMORPHONE HYDROCHLORIDE 1 MILLIGRAM(S): 2 INJECTION INTRAMUSCULAR; INTRAVENOUS; SUBCUTANEOUS at 08:37

## 2022-01-01 RX ADMIN — ONDANSETRON 4 MILLIGRAM(S): 8 TABLET, FILM COATED ORAL at 23:18

## 2022-01-01 RX ADMIN — MORPHINE SULFATE 2 MILLIGRAM(S): 50 CAPSULE, EXTENDED RELEASE ORAL at 01:30

## 2022-01-01 RX ADMIN — Medication 3.4 MICROGRAM(S)/KG/MIN: at 08:34

## 2022-01-01 RX ADMIN — OXYCODONE HYDROCHLORIDE 7.5 MILLIGRAM(S): 5 TABLET ORAL at 23:14

## 2022-01-01 RX ADMIN — Medication 1 MILLIGRAM(S): at 17:09

## 2022-01-01 RX ADMIN — LACTULOSE 20 GRAM(S): 10 SOLUTION ORAL at 14:42

## 2022-01-01 RX ADMIN — SODIUM CHLORIDE 75 MILLILITER(S): 9 INJECTION, SOLUTION INTRAVENOUS at 22:38

## 2022-01-01 RX ADMIN — BUMETANIDE 10 MG/HR: 0.25 INJECTION INTRAMUSCULAR; INTRAVENOUS at 03:32

## 2022-01-01 RX ADMIN — OXYCODONE HYDROCHLORIDE 7.5 MILLIGRAM(S): 5 TABLET ORAL at 03:23

## 2022-01-06 DIAGNOSIS — K44.9 DIAPHRAGMATIC HERNIA WITHOUT OBSTRUCTION OR GANGRENE: ICD-10-CM

## 2022-01-06 DIAGNOSIS — R10.9 UNSPECIFIED ABDOMINAL PAIN: ICD-10-CM

## 2022-01-06 DIAGNOSIS — K20.90 ESOPHAGITIS, UNSPECIFIED WITHOUT BLEEDING: ICD-10-CM

## 2022-01-06 DIAGNOSIS — K86.1 OTHER CHRONIC PANCREATITIS: ICD-10-CM

## 2022-01-06 DIAGNOSIS — D50.9 IRON DEFICIENCY ANEMIA, UNSPECIFIED: ICD-10-CM

## 2022-01-06 DIAGNOSIS — E43 UNSPECIFIED SEVERE PROTEIN-CALORIE MALNUTRITION: ICD-10-CM

## 2022-01-06 DIAGNOSIS — N26.1 ATROPHY OF KIDNEY (TERMINAL): ICD-10-CM

## 2022-01-06 DIAGNOSIS — N40.0 BENIGN PROSTATIC HYPERPLASIA WITHOUT LOWER URINARY TRACT SYMPTOMS: ICD-10-CM

## 2022-01-06 DIAGNOSIS — K85.90 ACUTE PANCREATITIS WITHOUT NECROSIS OR INFECTION, UNSPECIFIED: ICD-10-CM

## 2022-01-06 DIAGNOSIS — K22.2 ESOPHAGEAL OBSTRUCTION: ICD-10-CM

## 2022-01-06 DIAGNOSIS — G93.41 METABOLIC ENCEPHALOPATHY: ICD-10-CM

## 2022-01-06 DIAGNOSIS — F10.20 ALCOHOL DEPENDENCE, UNCOMPLICATED: ICD-10-CM

## 2022-01-06 DIAGNOSIS — R06.6 HICCOUGH: ICD-10-CM

## 2022-01-06 DIAGNOSIS — B19.20 UNSPECIFIED VIRAL HEPATITIS C WITHOUT HEPATIC COMA: ICD-10-CM

## 2022-01-06 DIAGNOSIS — Z79.899 OTHER LONG TERM (CURRENT) DRUG THERAPY: ICD-10-CM

## 2022-01-06 DIAGNOSIS — N18.4 CHRONIC KIDNEY DISEASE, STAGE 4 (SEVERE): ICD-10-CM

## 2022-01-06 DIAGNOSIS — E87.6 HYPOKALEMIA: ICD-10-CM

## 2022-01-06 DIAGNOSIS — N17.9 ACUTE KIDNEY FAILURE, UNSPECIFIED: ICD-10-CM

## 2022-01-07 ENCOUNTER — EMERGENCY (EMERGENCY)
Facility: HOSPITAL | Age: 62
LOS: 0 days | Discharge: HOME | End: 2022-01-07
Attending: EMERGENCY MEDICINE
Payer: COMMERCIAL

## 2022-01-07 VITALS
HEART RATE: 88 BPM | TEMPERATURE: 99 F | SYSTOLIC BLOOD PRESSURE: 134 MMHG | DIASTOLIC BLOOD PRESSURE: 87 MMHG | RESPIRATION RATE: 18 BRPM | OXYGEN SATURATION: 99 %

## 2022-01-07 VITALS
HEIGHT: 73 IN | OXYGEN SATURATION: 100 % | HEART RATE: 90 BPM | SYSTOLIC BLOOD PRESSURE: 129 MMHG | RESPIRATION RATE: 18 BRPM | WEIGHT: 149.91 LBS | TEMPERATURE: 99 F | DIASTOLIC BLOOD PRESSURE: 87 MMHG

## 2022-01-07 DIAGNOSIS — K25.5 CHRONIC OR UNSPECIFIED GASTRIC ULCER WITH PERFORATION: ICD-10-CM

## 2022-01-07 DIAGNOSIS — K20.90 ESOPHAGITIS, UNSPECIFIED WITHOUT BLEEDING: ICD-10-CM

## 2022-01-07 DIAGNOSIS — K85.90 ACUTE PANCREATITIS WITHOUT NECROSIS OR INFECTION, UNSPECIFIED: ICD-10-CM

## 2022-01-07 DIAGNOSIS — R10.9 UNSPECIFIED ABDOMINAL PAIN: ICD-10-CM

## 2022-01-07 DIAGNOSIS — R11.0 NAUSEA: ICD-10-CM

## 2022-01-07 DIAGNOSIS — J02.9 ACUTE PHARYNGITIS, UNSPECIFIED: ICD-10-CM

## 2022-01-07 LAB
ALBUMIN SERPL ELPH-MCNC: 3.4 G/DL — SIGNIFICANT CHANGE UP (ref 3.3–5)
ALP SERPL-CCNC: 121 U/L — HIGH (ref 40–120)
ALT FLD-CCNC: 15 U/L — SIGNIFICANT CHANGE UP (ref 12–78)
ANION GAP SERPL CALC-SCNC: 7 MMOL/L — SIGNIFICANT CHANGE UP (ref 5–17)
AST SERPL-CCNC: 8 U/L — LOW (ref 15–37)
BILIRUB SERPL-MCNC: 0.2 MG/DL — SIGNIFICANT CHANGE UP (ref 0.2–1.2)
BUN SERPL-MCNC: 41 MG/DL — HIGH (ref 7–23)
CALCIUM SERPL-MCNC: 8.5 MG/DL — SIGNIFICANT CHANGE UP (ref 8.5–10.1)
CHLORIDE SERPL-SCNC: 102 MMOL/L — SIGNIFICANT CHANGE UP (ref 96–108)
CK SERPL-CCNC: 64 U/L — SIGNIFICANT CHANGE UP (ref 26–308)
CO2 SERPL-SCNC: 27 MMOL/L — SIGNIFICANT CHANGE UP (ref 22–31)
CREAT SERPL-MCNC: 4.74 MG/DL — HIGH (ref 0.5–1.3)
GLUCOSE SERPL-MCNC: 121 MG/DL — HIGH (ref 70–99)
HCT VFR BLD CALC: 38.9 % — LOW (ref 39–50)
HGB BLD-MCNC: 11.5 G/DL — LOW (ref 13–17)
LIDOCAIN IGE QN: 153 U/L — SIGNIFICANT CHANGE UP (ref 73–393)
MCHC RBC-ENTMCNC: 24.2 PG — LOW (ref 27–34)
MCHC RBC-ENTMCNC: 29.6 GM/DL — LOW (ref 32–36)
MCV RBC AUTO: 81.9 FL — SIGNIFICANT CHANGE UP (ref 80–100)
PLATELET # BLD AUTO: 595 K/UL — HIGH (ref 150–400)
POTASSIUM SERPL-MCNC: 3.7 MMOL/L — SIGNIFICANT CHANGE UP (ref 3.5–5.3)
POTASSIUM SERPL-SCNC: 3.7 MMOL/L — SIGNIFICANT CHANGE UP (ref 3.5–5.3)
PROT SERPL-MCNC: 8.1 GM/DL — SIGNIFICANT CHANGE UP (ref 6–8.3)
RBC # BLD: 4.75 M/UL — SIGNIFICANT CHANGE UP (ref 4.2–5.8)
RBC # FLD: 24.3 % — HIGH (ref 10.3–14.5)
SODIUM SERPL-SCNC: 136 MMOL/L — SIGNIFICANT CHANGE UP (ref 135–145)
TROPONIN I, HIGH SENSITIVITY RESULT: 7.5 NG/L — SIGNIFICANT CHANGE UP
WBC # BLD: 12.22 K/UL — HIGH (ref 3.8–10.5)
WBC # FLD AUTO: 12.22 K/UL — HIGH (ref 3.8–10.5)

## 2022-01-07 PROCEDURE — 71045 X-RAY EXAM CHEST 1 VIEW: CPT | Mod: 26

## 2022-01-07 PROCEDURE — 99285 EMERGENCY DEPT VISIT HI MDM: CPT

## 2022-01-07 PROCEDURE — 93010 ELECTROCARDIOGRAM REPORT: CPT

## 2022-01-07 RX ORDER — LIDOCAINE 4 G/100G
5 CREAM TOPICAL ONCE
Refills: 0 | Status: COMPLETED | OUTPATIENT
Start: 2022-01-07 | End: 2022-01-07

## 2022-01-07 RX ORDER — SUCRALFATE 1 G
1 TABLET ORAL ONCE
Refills: 0 | Status: COMPLETED | OUTPATIENT
Start: 2022-01-07 | End: 2022-01-07

## 2022-01-07 RX ORDER — MORPHINE SULFATE 50 MG/1
4 CAPSULE, EXTENDED RELEASE ORAL ONCE
Refills: 0 | Status: DISCONTINUED | OUTPATIENT
Start: 2022-01-07 | End: 2022-01-07

## 2022-01-07 RX ORDER — ONDANSETRON 8 MG/1
1 TABLET, FILM COATED ORAL
Qty: 9 | Refills: 0
Start: 2022-01-07 | End: 2022-01-09

## 2022-01-07 RX ORDER — ONDANSETRON 8 MG/1
4 TABLET, FILM COATED ORAL ONCE
Refills: 0 | Status: COMPLETED | OUTPATIENT
Start: 2022-01-07 | End: 2022-01-07

## 2022-01-07 RX ADMIN — MORPHINE SULFATE 4 MILLIGRAM(S): 50 CAPSULE, EXTENDED RELEASE ORAL at 22:29

## 2022-01-07 RX ADMIN — LIDOCAINE 5 MILLILITER(S): 4 CREAM TOPICAL at 20:04

## 2022-01-07 RX ADMIN — MORPHINE SULFATE 4 MILLIGRAM(S): 50 CAPSULE, EXTENDED RELEASE ORAL at 22:16

## 2022-01-07 RX ADMIN — Medication 1 GRAM(S): at 20:12

## 2022-01-07 RX ADMIN — Medication 30 MILLILITER(S): at 20:04

## 2022-01-07 RX ADMIN — MORPHINE SULFATE 4 MILLIGRAM(S): 50 CAPSULE, EXTENDED RELEASE ORAL at 20:48

## 2022-01-07 RX ADMIN — ONDANSETRON 4 MILLIGRAM(S): 8 TABLET, FILM COATED ORAL at 22:17

## 2022-01-07 RX ADMIN — MORPHINE SULFATE 4 MILLIGRAM(S): 50 CAPSULE, EXTENDED RELEASE ORAL at 20:04

## 2022-01-07 NOTE — ED PROVIDER NOTE - OBJECTIVE STATEMENT
This patient is a 61 year old man with hx of chronic pancreatitis recently discharged 1 week ago from inpatient hospital at [facility] for esophagitis who presents to the ER c/o 1 week of continued pain.  He states that he left the hospital in pain and it has gotten worse.  He was prescribed medication but states that he was not able to  his prescriptions because [facility].  He reports pain in his epigastric area and chest 10/10 in severity burning with water brash and nausea as well as sore throat.  Patient wants to be re-admitted to the hospital so he can get his pain medications.

## 2022-01-07 NOTE — ED PROVIDER NOTE - PATIENT PORTAL LINK FT
You can access the FollowMyHealth Patient Portal offered by [facility] by registering at the following website: [email or web address]. By joining [employer]’s FollowMyHealth portal, you will also be able to view your health information using other applications (apps) compatible with our system.

## 2022-01-07 NOTE — ED PROVIDER NOTE - NSFOLLOWUPINSTRUCTIONS_ED_ALL_ED_FT
1) Take your prescription medications as instructed.  You need to  the medications from the pharmacy  2) Follow up with your primary care doctor  3) Follow-up with gastroenterology (GI)  4) Return to the ER for worsening or concerning symptoms

## 2022-01-07 NOTE — ED PROVIDER NOTE - CLINICAL SUMMARY MEDICAL DECISION MAKING FREE TEXT BOX
Patient with esophagitis not taking his prescription medications will give medications for pain.  Low suspicion that chest discomfort is ACS likely esophagitis.  Patient with hx of chronic pancreatitis benign abdominal exam.  Will check EKG, CXR, Labs and Re-eval.  Patient has been strongly encouraged to  his medications from the pharmacy and follow-up with GI.

## 2022-01-07 NOTE — ED ADULT TRIAGE NOTE - CHIEF COMPLAINT QUOTE
as per ems, pt c/o epigastric pain since 12/31/2021, admitted to hospital on 12/20/2021 and  discharged on 12/31/2021 for esophagitis, unable to  medications.

## 2022-01-07 NOTE — ED ADULT NURSE NOTE - OBJECTIVE STATEMENT
pt 60 y/o male c/c of intermittent abdominal, ribs, chest, spine, neck pain that started after he was discharged on 12/31. pt states once he got home GI pain started again. pt states he drank 1 beer on New year's day. pt denies ETOH use. pt could not  prescribed meds due to holidays. pt endorses nausea and diarrhea. AAOX4, pt speaking in full sentences. no abdominal distention noted, tenderness noted on all 4 quadrants. PMH of gout, hepatitis C, ETOH abuse.

## 2022-01-08 LAB
ANISOCYTOSIS BLD QL: SLIGHT — SIGNIFICANT CHANGE UP
BASOPHILS # BLD AUTO: 0.1 K/UL — SIGNIFICANT CHANGE UP (ref 0–0.2)
BASOPHILS NFR BLD AUTO: 0.8 % — SIGNIFICANT CHANGE UP (ref 0–2)
BURR CELLS BLD QL SMEAR: PRESENT — SIGNIFICANT CHANGE UP
EOSINOPHIL # BLD AUTO: 0 K/UL — SIGNIFICANT CHANGE UP (ref 0–0.5)
EOSINOPHIL NFR BLD AUTO: 0 % — SIGNIFICANT CHANGE UP (ref 0–6)
IMM GRANULOCYTES NFR BLD AUTO: 0.4 % — SIGNIFICANT CHANGE UP (ref 0–1.5)
LYMPHOCYTES # BLD AUTO: 18.7 % — SIGNIFICANT CHANGE UP (ref 13–44)
LYMPHOCYTES # BLD AUTO: 2.29 K/UL — SIGNIFICANT CHANGE UP (ref 1–3.3)
MACROCYTES BLD QL: SLIGHT — SIGNIFICANT CHANGE UP
MANUAL SMEAR VERIFICATION: SIGNIFICANT CHANGE UP
MICROCYTES BLD QL: SLIGHT — SIGNIFICANT CHANGE UP
MONOCYTES # BLD AUTO: 0.67 K/UL — SIGNIFICANT CHANGE UP (ref 0–0.9)
MONOCYTES NFR BLD AUTO: 5.5 % — SIGNIFICANT CHANGE UP (ref 2–14)
NEUTROPHILS # BLD AUTO: 9.11 K/UL — HIGH (ref 1.8–7.4)
NEUTROPHILS NFR BLD AUTO: 74.6 % — SIGNIFICANT CHANGE UP (ref 43–77)
NRBC # BLD: 0 /100 WBCS — SIGNIFICANT CHANGE UP (ref 0–0)
PLAT MORPH BLD: NORMAL — SIGNIFICANT CHANGE UP
RBC BLD AUTO: ABNORMAL
SCHISTOCYTES BLD QL AUTO: SLIGHT — SIGNIFICANT CHANGE UP
TARGETS BLD QL SMEAR: SLIGHT — SIGNIFICANT CHANGE UP

## 2022-01-24 ENCOUNTER — INPATIENT (INPATIENT)
Facility: HOSPITAL | Age: 62
LOS: 1 days | Discharge: ANOTHER HOSPITAL | End: 2022-01-26
Attending: HOSPITALIST | Admitting: HOSPITALIST
Payer: MEDICAID

## 2022-01-24 VITALS
SYSTOLIC BLOOD PRESSURE: 160 MMHG | DIASTOLIC BLOOD PRESSURE: 76 MMHG | WEIGHT: 162.04 LBS | HEIGHT: 73 IN | OXYGEN SATURATION: 100 % | TEMPERATURE: 98 F | RESPIRATION RATE: 17 BRPM | HEART RATE: 74 BPM

## 2022-01-24 LAB
ALBUMIN SERPL ELPH-MCNC: 2.8 G/DL — LOW (ref 3.3–5)
ALP SERPL-CCNC: 104 U/L — SIGNIFICANT CHANGE UP (ref 40–120)
ALT FLD-CCNC: 24 U/L — SIGNIFICANT CHANGE UP (ref 12–78)
ANION GAP SERPL CALC-SCNC: 5 MMOL/L — SIGNIFICANT CHANGE UP (ref 5–17)
AST SERPL-CCNC: 17 U/L — SIGNIFICANT CHANGE UP (ref 15–37)
BASOPHILS # BLD AUTO: 0.07 K/UL — SIGNIFICANT CHANGE UP (ref 0–0.2)
BASOPHILS NFR BLD AUTO: 0.5 % — SIGNIFICANT CHANGE UP (ref 0–2)
BILIRUB SERPL-MCNC: 0.1 MG/DL — LOW (ref 0.2–1.2)
BLD GP AB SCN SERPL QL: SIGNIFICANT CHANGE UP
BUN SERPL-MCNC: 50 MG/DL — HIGH (ref 7–23)
CALCIUM SERPL-MCNC: 7.4 MG/DL — LOW (ref 8.5–10.1)
CHLORIDE SERPL-SCNC: 96 MMOL/L — SIGNIFICANT CHANGE UP (ref 96–108)
CO2 SERPL-SCNC: 36 MMOL/L — HIGH (ref 22–31)
CREAT SERPL-MCNC: 4.22 MG/DL — HIGH (ref 0.5–1.3)
EOSINOPHIL # BLD AUTO: 0 K/UL — SIGNIFICANT CHANGE UP (ref 0–0.5)
EOSINOPHIL NFR BLD AUTO: 0 % — SIGNIFICANT CHANGE UP (ref 0–6)
FLUAV AG NPH QL: SIGNIFICANT CHANGE UP
FLUBV AG NPH QL: SIGNIFICANT CHANGE UP
GLUCOSE SERPL-MCNC: 115 MG/DL — HIGH (ref 70–99)
HCT VFR BLD CALC: 30.2 % — LOW (ref 39–50)
HGB BLD-MCNC: 8.7 G/DL — LOW (ref 13–17)
IMM GRANULOCYTES NFR BLD AUTO: 0.5 % — SIGNIFICANT CHANGE UP (ref 0–1.5)
LACTATE SERPL-SCNC: 1.3 MMOL/L — SIGNIFICANT CHANGE UP (ref 0.7–2)
LIDOCAIN IGE QN: 204 U/L — SIGNIFICANT CHANGE UP (ref 73–393)
LYMPHOCYTES # BLD AUTO: 1.79 K/UL — SIGNIFICANT CHANGE UP (ref 1–3.3)
LYMPHOCYTES # BLD AUTO: 13.5 % — SIGNIFICANT CHANGE UP (ref 13–44)
MCHC RBC-ENTMCNC: 24.6 PG — LOW (ref 27–34)
MCHC RBC-ENTMCNC: 28.8 G/DL — LOW (ref 32–36)
MCV RBC AUTO: 85.6 FL — SIGNIFICANT CHANGE UP (ref 80–100)
MONOCYTES # BLD AUTO: 1.02 K/UL — HIGH (ref 0–0.9)
MONOCYTES NFR BLD AUTO: 7.7 % — SIGNIFICANT CHANGE UP (ref 2–14)
NEUTROPHILS # BLD AUTO: 10.33 K/UL — HIGH (ref 1.8–7.4)
NEUTROPHILS NFR BLD AUTO: 77.8 % — HIGH (ref 43–77)
NRBC # BLD: 0 /100 WBCS — SIGNIFICANT CHANGE UP (ref 0–0)
PLATELET # BLD AUTO: 408 K/UL — HIGH (ref 150–400)
POTASSIUM SERPL-MCNC: 3.8 MMOL/L — SIGNIFICANT CHANGE UP (ref 3.5–5.3)
POTASSIUM SERPL-SCNC: 3.8 MMOL/L — SIGNIFICANT CHANGE UP (ref 3.5–5.3)
PROT SERPL-MCNC: 6.9 GM/DL — SIGNIFICANT CHANGE UP (ref 6–8.3)
RBC # BLD: 3.53 M/UL — LOW (ref 4.2–5.8)
RBC # FLD: 22.4 % — HIGH (ref 10.3–14.5)
SARS-COV-2 RNA SPEC QL NAA+PROBE: SIGNIFICANT CHANGE UP
SODIUM SERPL-SCNC: 137 MMOL/L — SIGNIFICANT CHANGE UP (ref 135–145)
TROPONIN I, HIGH SENSITIVITY RESULT: 5.9 NG/L — SIGNIFICANT CHANGE UP
WBC # BLD: 13.28 K/UL — HIGH (ref 3.8–10.5)
WBC # FLD AUTO: 13.28 K/UL — HIGH (ref 3.8–10.5)

## 2022-01-24 PROCEDURE — 99285 EMERGENCY DEPT VISIT HI MDM: CPT

## 2022-01-24 RX ORDER — SODIUM CHLORIDE 9 MG/ML
1000 INJECTION INTRAMUSCULAR; INTRAVENOUS; SUBCUTANEOUS ONCE
Refills: 0 | Status: COMPLETED | OUTPATIENT
Start: 2022-01-24 | End: 2022-01-24

## 2022-01-24 RX ORDER — ONDANSETRON 8 MG/1
4 TABLET, FILM COATED ORAL ONCE
Refills: 0 | Status: COMPLETED | OUTPATIENT
Start: 2022-01-24 | End: 2022-01-24

## 2022-01-24 RX ORDER — FAMOTIDINE 10 MG/ML
20 INJECTION INTRAVENOUS ONCE
Refills: 0 | Status: COMPLETED | OUTPATIENT
Start: 2022-01-24 | End: 2022-01-24

## 2022-01-24 RX ORDER — MORPHINE SULFATE 50 MG/1
4 CAPSULE, EXTENDED RELEASE ORAL ONCE
Refills: 0 | Status: DISCONTINUED | OUTPATIENT
Start: 2022-01-24 | End: 2022-01-24

## 2022-01-24 RX ADMIN — MORPHINE SULFATE 4 MILLIGRAM(S): 50 CAPSULE, EXTENDED RELEASE ORAL at 23:15

## 2022-01-24 RX ADMIN — SODIUM CHLORIDE 1000 MILLILITER(S): 9 INJECTION INTRAMUSCULAR; INTRAVENOUS; SUBCUTANEOUS at 22:59

## 2022-01-24 RX ADMIN — FAMOTIDINE 20 MILLIGRAM(S): 10 INJECTION INTRAVENOUS at 22:59

## 2022-01-24 RX ADMIN — ONDANSETRON 4 MILLIGRAM(S): 8 TABLET, FILM COATED ORAL at 22:59

## 2022-01-24 RX ADMIN — MORPHINE SULFATE 4 MILLIGRAM(S): 50 CAPSULE, EXTENDED RELEASE ORAL at 22:58

## 2022-01-24 NOTE — ED PROVIDER NOTE - OBJECTIVE STATEMENT
61y Male with PMHx of chronic pancreatitis, etoh abuse, CKD previously on dialysis per chart presents to the ER for abdominal pain. Patient reports abdominal pain, multiple episodes of vomiting since this morning. Reports pain is 10/10, denies pain medications. Patient is a poor historian, uncooperative with additional questions at this time.     Per chart review, patient previously admitted and discharged 12/31/21 for acute on chronic pancreatitis. Followed by Dr. Ceballos. Last Cr 4.74. Patient noncompliant with medications.

## 2022-01-24 NOTE — ED PROVIDER NOTE - CLINICAL SUMMARY MEDICAL DECISION MAKING FREE TEXT BOX
61y Male with PMHx of chronic pancreatitis, etoh abuse, CKD previously on dialysis per chart presents to the ER for abdominal pain. Abdominal pain, multiple episodes of vomiting since this morning. Reports pain is 10/10. Vital signs stable, epigastric tenderness, actively vomiting. Concern for acute on chronic pancreatitis vs etoh intox, less likely ACS. Will get labs, meds, IVF, reassess. Dispo pending results.

## 2022-01-24 NOTE — ED ADULT NURSE NOTE - NSIMPLEMENTINTERV_GEN_ALL_ED
Implemented All Fall Risk Interventions:  [place] to call system. Call bell, personal items and telephone within reach. Instruct patient to call for assistance. Room bathroom lighting operational. Non-slip footwear when patient is off stretcher. Physically safe environment: no spills, clutter or unnecessary equipment. Stretcher in lowest position, wheels locked, appropriate side rails in place. Provide visual cue, wrist band, yellow gown, etc. Monitor gait and stability. Monitor for mental status changes and reorient to person, place, and time. Review medications for side effects contributing to fall risk. Reinforce activity limits and safety measures with patient and family.

## 2022-01-24 NOTE — ED PROVIDER NOTE - NS ED ROS FT
Denies fever, chills, chest pain, SOB or urinary complaints. Noncompliant with additional questions at this time.

## 2022-01-24 NOTE — ED ADULT NURSE NOTE - OBJECTIVE STATEMENT
Patient BIBA complaining of vomiting and abdominal pain. Patient BIBA complaining of vomiting and abdominal pain since this am. Patient's vomit is dark brown. Pt poor historian at this time.

## 2022-01-25 DIAGNOSIS — N40.0 BENIGN PROSTATIC HYPERPLASIA WITHOUT LOWER URINARY TRACT SYMPTOMS: ICD-10-CM

## 2022-01-25 DIAGNOSIS — D64.9 ANEMIA, UNSPECIFIED: ICD-10-CM

## 2022-01-25 DIAGNOSIS — N18.4 CHRONIC KIDNEY DISEASE, STAGE 4 (SEVERE): ICD-10-CM

## 2022-01-25 DIAGNOSIS — K86.1 OTHER CHRONIC PANCREATITIS: ICD-10-CM

## 2022-01-25 DIAGNOSIS — K29.20 ALCOHOLIC GASTRITIS WITHOUT BLEEDING: ICD-10-CM

## 2022-01-25 DIAGNOSIS — F10.10 ALCOHOL ABUSE, UNCOMPLICATED: ICD-10-CM

## 2022-01-25 LAB
APTT BLD: 27.5 SEC — SIGNIFICANT CHANGE UP (ref 27.5–35.5)
HCT VFR BLD CALC: 29.7 % — LOW (ref 39–50)
HGB BLD-MCNC: 8.4 G/DL — LOW (ref 13–17)
INR BLD: 0.87 RATIO — LOW (ref 0.88–1.16)
MCHC RBC-ENTMCNC: 24.1 PG — LOW (ref 27–34)
MCHC RBC-ENTMCNC: 28.3 G/DL — LOW (ref 32–36)
MCV RBC AUTO: 85.3 FL — SIGNIFICANT CHANGE UP (ref 80–100)
NRBC # BLD: 0 /100 WBCS — SIGNIFICANT CHANGE UP (ref 0–0)
PLATELET # BLD AUTO: 353 K/UL — SIGNIFICANT CHANGE UP (ref 150–400)
PROTHROM AB SERPL-ACNC: 10.2 SEC — LOW (ref 10.6–13.6)
RBC # BLD: 3.48 M/UL — LOW (ref 4.2–5.8)
RBC # FLD: 22.4 % — HIGH (ref 10.3–14.5)
WBC # BLD: 11.09 K/UL — HIGH (ref 3.8–10.5)
WBC # FLD AUTO: 11.09 K/UL — HIGH (ref 3.8–10.5)

## 2022-01-25 PROCEDURE — 99222 1ST HOSP IP/OBS MODERATE 55: CPT

## 2022-01-25 PROCEDURE — 74176 CT ABD & PELVIS W/O CONTRAST: CPT | Mod: 26,MA

## 2022-01-25 RX ORDER — METOCLOPRAMIDE HCL 10 MG
10 TABLET ORAL ONCE
Refills: 0 | Status: COMPLETED | OUTPATIENT
Start: 2022-01-25 | End: 2022-01-25

## 2022-01-25 RX ORDER — SODIUM BICARBONATE 1 MEQ/ML
650 SYRINGE (ML) INTRAVENOUS EVERY 6 HOURS
Refills: 0 | Status: DISCONTINUED | OUTPATIENT
Start: 2022-01-25 | End: 2022-01-26

## 2022-01-25 RX ORDER — MORPHINE SULFATE 50 MG/1
4 CAPSULE, EXTENDED RELEASE ORAL ONCE
Refills: 0 | Status: DISCONTINUED | OUTPATIENT
Start: 2022-01-25 | End: 2022-01-25

## 2022-01-25 RX ORDER — SODIUM CHLORIDE 9 MG/ML
500 INJECTION INTRAMUSCULAR; INTRAVENOUS; SUBCUTANEOUS
Refills: 0 | Status: COMPLETED | OUTPATIENT
Start: 2022-01-25 | End: 2022-01-25

## 2022-01-25 RX ORDER — PANTOPRAZOLE SODIUM 20 MG/1
40 TABLET, DELAYED RELEASE ORAL
Refills: 0 | Status: DISCONTINUED | OUTPATIENT
Start: 2022-01-25 | End: 2022-01-26

## 2022-01-25 RX ORDER — SENNA PLUS 8.6 MG/1
2 TABLET ORAL AT BEDTIME
Refills: 0 | Status: DISCONTINUED | OUTPATIENT
Start: 2022-01-25 | End: 2022-01-26

## 2022-01-25 RX ORDER — THIAMINE MONONITRATE (VIT B1) 100 MG
100 TABLET ORAL DAILY
Refills: 0 | Status: DISCONTINUED | OUTPATIENT
Start: 2022-01-25 | End: 2022-01-26

## 2022-01-25 RX ORDER — FOLIC ACID 0.8 MG
1 TABLET ORAL DAILY
Refills: 0 | Status: DISCONTINUED | OUTPATIENT
Start: 2022-01-25 | End: 2022-01-26

## 2022-01-25 RX ORDER — LIPASE/PROTEASE/AMYLASE 16-48-48K
2 CAPSULE,DELAYED RELEASE (ENTERIC COATED) ORAL
Refills: 0 | Status: DISCONTINUED | OUTPATIENT
Start: 2022-01-25 | End: 2022-01-26

## 2022-01-25 RX ORDER — ONDANSETRON 8 MG/1
4 TABLET, FILM COATED ORAL ONCE
Refills: 0 | Status: COMPLETED | OUTPATIENT
Start: 2022-01-25 | End: 2022-01-25

## 2022-01-25 RX ORDER — SUCRALFATE 1 G
1 TABLET ORAL
Refills: 0 | Status: DISCONTINUED | OUTPATIENT
Start: 2022-01-25 | End: 2022-01-26

## 2022-01-25 RX ORDER — CALCIUM ACETATE 667 MG
667 TABLET ORAL
Refills: 0 | Status: DISCONTINUED | OUTPATIENT
Start: 2022-01-25 | End: 2022-01-26

## 2022-01-25 RX ORDER — TAMSULOSIN HYDROCHLORIDE 0.4 MG/1
0.4 CAPSULE ORAL AT BEDTIME
Refills: 0 | Status: DISCONTINUED | OUTPATIENT
Start: 2022-01-25 | End: 2022-01-26

## 2022-01-25 RX ORDER — HYDROMORPHONE HYDROCHLORIDE 2 MG/ML
0.5 INJECTION INTRAMUSCULAR; INTRAVENOUS; SUBCUTANEOUS EVERY 6 HOURS
Refills: 0 | Status: DISCONTINUED | OUTPATIENT
Start: 2022-01-25 | End: 2022-01-26

## 2022-01-25 RX ORDER — MORPHINE SULFATE 50 MG/1
2 CAPSULE, EXTENDED RELEASE ORAL ONCE
Refills: 0 | Status: DISCONTINUED | OUTPATIENT
Start: 2022-01-25 | End: 2022-01-25

## 2022-01-25 RX ORDER — ONDANSETRON 8 MG/1
4 TABLET, FILM COATED ORAL EVERY 6 HOURS
Refills: 0 | Status: DISCONTINUED | OUTPATIENT
Start: 2022-01-25 | End: 2022-01-26

## 2022-01-25 RX ADMIN — Medication 650 MILLIGRAM(S): at 17:38

## 2022-01-25 RX ADMIN — ONDANSETRON 4 MILLIGRAM(S): 8 TABLET, FILM COATED ORAL at 02:06

## 2022-01-25 RX ADMIN — HYDROMORPHONE HYDROCHLORIDE 0.5 MILLIGRAM(S): 2 INJECTION INTRAMUSCULAR; INTRAVENOUS; SUBCUTANEOUS at 13:49

## 2022-01-25 RX ADMIN — PANTOPRAZOLE SODIUM 40 MILLIGRAM(S): 20 TABLET, DELAYED RELEASE ORAL at 17:37

## 2022-01-25 RX ADMIN — Medication 650 MILLIGRAM(S): at 12:47

## 2022-01-25 RX ADMIN — MORPHINE SULFATE 4 MILLIGRAM(S): 50 CAPSULE, EXTENDED RELEASE ORAL at 02:06

## 2022-01-25 RX ADMIN — HYDROMORPHONE HYDROCHLORIDE 0.5 MILLIGRAM(S): 2 INJECTION INTRAMUSCULAR; INTRAVENOUS; SUBCUTANEOUS at 19:48

## 2022-01-25 RX ADMIN — TAMSULOSIN HYDROCHLORIDE 0.4 MILLIGRAM(S): 0.4 CAPSULE ORAL at 22:40

## 2022-01-25 RX ADMIN — HYDROMORPHONE HYDROCHLORIDE 0.5 MILLIGRAM(S): 2 INJECTION INTRAMUSCULAR; INTRAVENOUS; SUBCUTANEOUS at 13:37

## 2022-01-25 RX ADMIN — PANTOPRAZOLE SODIUM 40 MILLIGRAM(S): 20 TABLET, DELAYED RELEASE ORAL at 06:35

## 2022-01-25 RX ADMIN — MORPHINE SULFATE 4 MILLIGRAM(S): 50 CAPSULE, EXTENDED RELEASE ORAL at 02:20

## 2022-01-25 RX ADMIN — SODIUM CHLORIDE 100 MILLILITER(S): 9 INJECTION INTRAMUSCULAR; INTRAVENOUS; SUBCUTANEOUS at 08:28

## 2022-01-25 RX ADMIN — MORPHINE SULFATE 2 MILLIGRAM(S): 50 CAPSULE, EXTENDED RELEASE ORAL at 09:32

## 2022-01-25 RX ADMIN — Medication 1 GRAM(S): at 06:35

## 2022-01-25 RX ADMIN — Medication 1 TABLET(S): at 12:47

## 2022-01-25 RX ADMIN — ONDANSETRON 4 MILLIGRAM(S): 8 TABLET, FILM COATED ORAL at 08:31

## 2022-01-25 RX ADMIN — ONDANSETRON 4 MILLIGRAM(S): 8 TABLET, FILM COATED ORAL at 22:47

## 2022-01-25 RX ADMIN — Medication 1 MILLIGRAM(S): at 12:47

## 2022-01-25 RX ADMIN — Medication 100 MILLIGRAM(S): at 12:47

## 2022-01-25 RX ADMIN — Medication 2 CAPSULE(S): at 17:37

## 2022-01-25 RX ADMIN — Medication 667 MILLIGRAM(S): at 17:37

## 2022-01-25 RX ADMIN — MORPHINE SULFATE 4 MILLIGRAM(S): 50 CAPSULE, EXTENDED RELEASE ORAL at 06:00

## 2022-01-25 RX ADMIN — Medication 2 CAPSULE(S): at 08:28

## 2022-01-25 RX ADMIN — MORPHINE SULFATE 4 MILLIGRAM(S): 50 CAPSULE, EXTENDED RELEASE ORAL at 05:38

## 2022-01-25 RX ADMIN — Medication 2 CAPSULE(S): at 12:47

## 2022-01-25 RX ADMIN — MORPHINE SULFATE 2 MILLIGRAM(S): 50 CAPSULE, EXTENDED RELEASE ORAL at 09:45

## 2022-01-25 RX ADMIN — Medication 667 MILLIGRAM(S): at 08:28

## 2022-01-25 RX ADMIN — Medication 1 GRAM(S): at 17:37

## 2022-01-25 RX ADMIN — Medication 667 MILLIGRAM(S): at 12:47

## 2022-01-25 RX ADMIN — Medication 10 MILLIGRAM(S): at 05:38

## 2022-01-25 RX ADMIN — Medication 1 GRAM(S): at 12:47

## 2022-01-25 NOTE — H&P ADULT - NSHPPHYSICALEXAM_GEN_ALL_CORE
Physical exam:  General: patient in no acute distress, resting comfortably  Head:  Atraumatic, Normocephalic  Eyes: EOMI, PERRLA, clear sclera  Neck: Supple, thyroid nontender, non enlarged  Cardio: S1/S2 +ve, regular rate and rhythm, no M/G/R  Resp: clear to ausculation bilaterally, no rales or wheezes  GI: abdominal tenderness, mild to moderate. non distended, voluntary guarding, BS +ve x 4  Ext: no significant pedal edema  Neuro: CN 2-12 intact, no significant motor or sensory deficits.  Skin: No rashes or lesions

## 2022-01-25 NOTE — CHART NOTE - NSCHARTNOTEFT_GEN_A_CORE
Patient seen and examined at bedside. Patient was nauseous and vomiting bile. Reports stomach pain. Dilaudid 0.5 mg IV PRN. ordered. Vitals, imaging, and labs reviewed with patient.    Vital Signs Last 24 Hrs  T(C): 36.1 (25 Jan 2022 16:55), Max: 36.7 (24 Jan 2022 21:24)  T(F): 97 (25 Jan 2022 16:55), Max: 98.1 (25 Jan 2022 09:11)  HR: 91 (25 Jan 2022 16:55) (74 - 91)  BP: 141/75 (25 Jan 2022 16:55) (129/74 - 160/76)  BP(mean): --  RR: 18 (25 Jan 2022 16:55) (17 - 20)  SpO2: 96% (25 Jan 2022 16:55) (96% - 100%)    General: patient in no acute distress, resting comfortably  Head:  Atraumatic, Normocephalic  Eyes: EOMI, PERRLA, clear sclera  Neck: Supple, thyroid nontender, non enlarged  Cardio: S1/S2 +ve, regular rate and rhythm, no M/G/R  Resp: clear to ausculation bilaterally, no rales or wheezes  GI: abdominal tenderness, mild to moderate. non distended, voluntary guarding, BS +ve x 4  Ext: no significant pedal edema  Neuro: CN 2-12 intact, no significant motor or sensory deficits.  Skin: No rashes or lesions      Agree with plan as per H&P with the following additions:    60 y/o M with hx of ETOH abuse, chronic pancreatitis, Hepatitis C, Gout, hx of gastric perforation in 2019, and hx of CKD with prior HD presents with nausea/vomiting.  Patient states that he started vomiting uncontrollably yesterday morning.  Denies bloody or bilious contents to the vomitus.  Patient reports significant abdominal pain to the epigastrium and also lower abdomen.  States that he does not drink alcohol anymore.  Recently discharged from [facility], had an EGD on 12/12 which reveal esophagitis and gastritis.  Vitals stable, labs benign.  Will admit to med surg.       Problem/Plan - 1:  ·  Problem: Alcoholic gastritis.   ·  Plan: Alcoholic gastritis + esophagitis    Zofran q6PRN, PPI iv BID, Morphine PRN, carafate  IV fluids, advance diet as tolerated.    Problem/Plan - 2:  ·  Problem: Anemia.   ·  Plan: Hb 8.7, was recently 11.5  Will trend labs.    Problem/Plan - 3:  ·  Problem: Alcohol abuse.   ·  Plan: Thiamine, Folate, MVI daily.    Problem/Plan - 4:  ·  Problem: Chronic pancreatitis.   ·  Plan: dilaudid prn.    Problem/Plan - 5:  ·  Problem: Stage 4 chronic kidney disease.   ·  Plan: calcium acetate, NaHCO3.    Problem/Plan - 6:  ·  Problem: BPH without urinary obstruction.   ·  Plan: tamsulosin.

## 2022-01-25 NOTE — CONSULT NOTE ADULT - SUBJECTIVE AND OBJECTIVE BOX
Patient chart reviewed, full consult to follow.     Thank you for the courtesy of this consultation. [facility] NEPHROLOGY SERVICES, [facility]  NEPHROLOGY AND HYPERTENSION  [address]  [address]  [place] [address]  [phone]    MD HALIE OLIVIA MD ANDREY GONCHARUK, MD MADHU KORRAPATI, MD YELENA ROSENBERG, MD BINNY KOSHY, MD CHRISTOPHER CAPUTO, MD EDWARD BOVER, MD      Information from chart:  "Patient is a 61y old  Male who presents with a chief complaint of vomiting, gastritis (25 Jan 2022 09:28)    HPI:  60 y/o M with hx of ETOH abuse, chronic pancreatitis, Hepatitis C, Gout, hx of gastric perforation in 2019, and hx of CKD with prior HD presents with nausea/vomiting.  Patient states that he started vomiting uncontrollably yesterday morning.  Denies bloody or bilious contents to the vomitus.  Patient reports significant abdominal pain to the epigastrium and also lower abdomen.  States that he does not drink alcohol anymore.  Recently discharged from [facility], had an EGD on 12/12 which reveal esophagitis and gastritis.  Vitals stable, labs benign.  Will admit to med surg.  (25 Jan 2022 06:29)     Persistent nausea and vomiting     PAST MEDICAL & SURGICAL HISTORY:  ETOH abuse    Pancreatitis    Hepatitis C  treated    Gout    No significant past surgical history      FAMILY HISTORY:  FH: HTN (hypertension)      Allergies    No Known Allergies    Intolerances      Home Medications:  amLODIPine 10 mg oral tablet: 1 tab(s) orally once a day (07 Jan 2022 18:43)  calcium acetate 667 mg oral tablet: 1 tab(s) orally 3 times a day (07 Jan 2022 18:43)    MEDICATIONS  (STANDING):  calcium acetate 667 milliGRAM(s) Oral three times a day with meals  folic acid 1 milliGRAM(s) Oral daily  multivitamin 1 Tablet(s) Oral daily  pancrelipase  (CREON  6,000 Lipase Units) 2 Capsule(s) Oral three times a day with meals  pantoprazole  Injectable 40 milliGRAM(s) IV Push two times a day  senna 2 Tablet(s) Oral at bedtime  sodium bicarbonate 650 milliGRAM(s) Oral every 6 hours  sucralfate 1 Gram(s) Oral four times a day  tamsulosin 0.4 milliGRAM(s) Oral at bedtime  thiamine 100 milliGRAM(s) Oral daily    MEDICATIONS  (PRN):  HYDROmorphone  Injectable 0.5 milliGRAM(s) IV Push every 6 hours PRN Moderate Pain (4 - 6)  ondansetron Injectable 4 milliGRAM(s) IV Push every 6 hours PRN Nausea and/or Vomiting    Vital Signs Last 24 Hrs  T(C): 36.6 (25 Jan 2022 20:08), Max: 36.7 (24 Jan 2022 21:24)  T(F): 97.8 (25 Jan 2022 20:08), Max: 98.1 (25 Jan 2022 09:11)  HR: 88 (25 Jan 2022 20:08) (74 - 91)  BP: 155/70 (25 Jan 2022 20:08) (129/74 - 160/76)  BP(mean): --  RR: 18 (25 Jan 2022 20:08) (17 - 20)  SpO2: 99% (25 Jan 2022 20:08) (96% - 100%)    Daily Height in cm: 185.42 (24 Jan 2022 21:24)    Daily     CAPILLARY BLOOD GLUCOSE        PHYSICAL EXAM:      T(C): 36.6 (01-25-22 @ 20:08), Max: 36.7 (01-24-22 @ 21:24)  HR: 88 (01-25-22 @ 20:08) (74 - 91)  BP: 155/70 (01-25-22 @ 20:08) (129/74 - 160/76)  RR: 18 (01-25-22 @ 20:08) (17 - 20)  SpO2: 99% (01-25-22 @ 20:08) (96% - 100%)  Wt(kg): --  Lungs clear  Heart S1S2  Abd soft NT ND  Extremities:   tr edema              01-24    137  |  96  |  50<H>  ----------------------------<  115<H>  3.8   |  36<H>  |  4.22<H>    Ca    7.4<L>      24 Jan 2022 22:35    TPro  6.9  /  Alb  2.8<L>  /  TBili  0.1<L>  /  DBili  x   /  AST  17  /  ALT  24  /  AlkPhos  104  01-24                          8.4    11.09 )-----------( 353      ( 25 Jan 2022 14:47 )             29.7     Creatinine Trend: 4.22<--, 4.74<--, 3.90<--, 3.82<--, 3.93<--, 3.67<--          Assessment   YUNIEL CKD 4; pre renal azotemia     Plan  IVF maintenance NS  GI follow up      Austin Dukes MD        Patient chart reviewed, full consult to follow.     Thank you for the courtesy of this consultation. [facility] NEPHROLOGY SERVICES, [facility]  NEPHROLOGY AND HYPERTENSION  [address]  [address]  [place] [address]  [phone]    MD HALIE OLIVIA MD ANDREY GONCHARUK, MD MADHU KORRAPATI, MD YELENA ROSENBERG, MD BINNY KOSHY, MD CHRISTOPHER CAPUTO, MD EDWARD BOVER, MD      Information from chart:  "Patient is a 61y old  Male who presents with a chief complaint of vomiting, gastritis (25 Jan 2022 09:28)    HPI:  62 y/o M with hx of ETOH abuse, chronic pancreatitis, Hepatitis C, Gout, hx of gastric perforation in 2019, and hx of CKD with prior HD presents with nausea/vomiting.  Patient states that he started vomiting uncontrollably yesterday morning.  Denies bloody or bilious contents to the vomitus.  Patient reports significant abdominal pain to the epigastrium and also lower abdomen.  States that he does not drink alcohol anymore.  Recently discharged from [facility], had an EGD on 12/12 which reveal esophagitis and gastritis.  Vitals stable, labs benign.  Will admit to med surg.  (25 Jan 2022 06:29)     Persistent nausea and vomiting     PAST MEDICAL & SURGICAL HISTORY:  ETOH abuse    Pancreatitis    Hepatitis C  treated    Gout    No significant past surgical history      FAMILY HISTORY:  FH: HTN (hypertension)      Allergies    No Known Allergies    Intolerances      Home Medications:  amLODIPine 10 mg oral tablet: 1 tab(s) orally once a day (07 Jan 2022 18:43)  calcium acetate 667 mg oral tablet: 1 tab(s) orally 3 times a day (07 Jan 2022 18:43)    MEDICATIONS  (STANDING):  calcium acetate 667 milliGRAM(s) Oral three times a day with meals  folic acid 1 milliGRAM(s) Oral daily  multivitamin 1 Tablet(s) Oral daily  pancrelipase  (CREON  6,000 Lipase Units) 2 Capsule(s) Oral three times a day with meals  pantoprazole  Injectable 40 milliGRAM(s) IV Push two times a day  senna 2 Tablet(s) Oral at bedtime  sodium bicarbonate 650 milliGRAM(s) Oral every 6 hours  sucralfate 1 Gram(s) Oral four times a day  tamsulosin 0.4 milliGRAM(s) Oral at bedtime  thiamine 100 milliGRAM(s) Oral daily    MEDICATIONS  (PRN):  HYDROmorphone  Injectable 0.5 milliGRAM(s) IV Push every 6 hours PRN Moderate Pain (4 - 6)  ondansetron Injectable 4 milliGRAM(s) IV Push every 6 hours PRN Nausea and/or Vomiting    Vital Signs Last 24 Hrs  T(C): 36.6 (25 Jan 2022 20:08), Max: 36.7 (24 Jan 2022 21:24)  T(F): 97.8 (25 Jan 2022 20:08), Max: 98.1 (25 Jan 2022 09:11)  HR: 88 (25 Jan 2022 20:08) (74 - 91)  BP: 155/70 (25 Jan 2022 20:08) (129/74 - 160/76)  BP(mean): --  RR: 18 (25 Jan 2022 20:08) (17 - 20)  SpO2: 99% (25 Jan 2022 20:08) (96% - 100%)    Daily Height in cm: 185.42 (24 Jan 2022 21:24)    Daily     CAPILLARY BLOOD GLUCOSE        PHYSICAL EXAM:      T(C): 36.6 (01-25-22 @ 20:08), Max: 36.7 (01-24-22 @ 21:24)  HR: 88 (01-25-22 @ 20:08) (74 - 91)  BP: 155/70 (01-25-22 @ 20:08) (129/74 - 160/76)  RR: 18 (01-25-22 @ 20:08) (17 - 20)  SpO2: 99% (01-25-22 @ 20:08) (96% - 100%)  Wt(kg): --  Lungs clear  Heart S1S2  Abd soft NT ND  Extremities:   tr edema              01-24    137  |  96  |  50<H>  ----------------------------<  115<H>  3.8   |  36<H>  |  4.22<H>    Ca    7.4<L>      24 Jan 2022 22:35    TPro  6.9  /  Alb  2.8<L>  /  TBili  0.1<L>  /  DBili  x   /  AST  17  /  ALT  24  /  AlkPhos  104  01-24                          8.4    11.09 )-----------( 353      ( 25 Jan 2022 14:47 )             29.7     Creatinine Trend: 4.22<--, 4.74<--, 3.90<--, 3.82<--, 3.93<--, 3.67<--          Assessment   YUNIEL CKD 4; pre renal azotemia     Plan  IVF maintenance NS  GI follow up      Austin Dukes MD  .

## 2022-01-25 NOTE — H&P ADULT - ASSESSMENT
62 y/o M with hx of ETOH abuse, chronic pancreatitis, Hepatitis C, Gout, hx of gastric perforation in 2019, and hx of CKD with prior HD presents with nausea/vomiting.  Patient states that he started vomiting uncontrollably yesterday morning.  Denies bloody or bilious contents to the vomitus.  Patient reports significant abdominal pain to the epigastrium and also lower abdomen.  States that he does not drink alcohol anymore.  Recently discharged from [facility], had an EGD on 12/12 which reveal esophagitis and gastritis.  Vitals stable, labs benign.  Will admit to med surg.

## 2022-01-25 NOTE — H&P ADULT - HISTORY OF PRESENT ILLNESS
62 y/o M with hx of ETOH abuse, chronic pancreatitis, Hepatitis C, Gout, hx of gastric perforation in 2019, and hx of CKD with prior HD presents with nausea/vomiting.  Patient states that he started vomiting uncontrollably yesterday morning.  Denies bloody or bilious contents to the vomitus.  Patient reports significant abdominal pain to the epigastrium and also lower abdomen.  States that he does not drink alcohol anymore.  Recently discharged from [facility], had an EGD on 12/12 which reveal esophagitis and gastritis.  Vitals stable, labs benign.  Will admit to med surg.

## 2022-01-25 NOTE — H&P ADULT - NSHPLABSRESULTS_GEN_ALL_CORE
Recent Vitals  T(C): 36.5 (01-25-22 @ 03:56), Max: 36.7 (01-24-22 @ 21:24)  HR: 79 (01-25-22 @ 03:56) (74 - 79)  BP: 142/76 (01-25-22 @ 03:56) (142/76 - 160/76)  RR: 17 (01-25-22 @ 03:56) (17 - 17)  SpO2: 100% (01-25-22 @ 03:56) (100% - 100%)                        8.7    13.28 )-----------( 408      ( 24 Jan 2022 22:35 )             30.2     01-24    137  |  96  |  50<H>  ----------------------------<  115<H>  3.8   |  36<H>  |  4.22<H>    Ca    7.4<L>      24 Jan 2022 22:35    TPro  6.9  /  Alb  2.8<L>  /  TBili  0.1<L>  /  DBili  x   /  AST  17  /  ALT  24  /  AlkPhos  104  01-24    PT/INR - ( 24 Jan 2022 22:34 )   PT: 10.2 sec;   INR: 0.87 ratio         PTT - ( 24 Jan 2022 22:34 )  PTT:27.5 sec  LIVER FUNCTIONS - ( 24 Jan 2022 22:35 )  Alb: 2.8 g/dL / Pro: 6.9 gm/dL / ALK PHOS: 104 U/L / ALT: 24 U/L / AST: 17 U/L / GGT: x               Home Medications:  amLODIPine 10 mg oral tablet: 1 tab(s) orally once a day (07 Jan 2022 18:43)  calcium acetate 667 mg oral tablet: 1 tab(s) orally 3 times a day (07 Jan 2022 18:43)

## 2022-01-25 NOTE — H&P ADULT - PROBLEM SELECTOR PLAN 1
Alcoholic gastritis + esophagitis    Zofran q6PRN, PPI iv BID, Morphine PRN, carafate  IV fluids, advance diet as tolerated

## 2022-01-26 ENCOUNTER — INPATIENT (INPATIENT)
Facility: HOSPITAL | Age: 62
LOS: 6 days | Discharge: HOME | End: 2022-02-02
Attending: INTERNAL MEDICINE | Admitting: INTERNAL MEDICINE
Payer: MEDICAID

## 2022-01-26 ENCOUNTER — TRANSCRIPTION ENCOUNTER (OUTPATIENT)
Age: 62
End: 2022-01-26

## 2022-01-26 VITALS
HEART RATE: 72 BPM | RESPIRATION RATE: 20 BRPM | OXYGEN SATURATION: 99 % | TEMPERATURE: 98 F | DIASTOLIC BLOOD PRESSURE: 57 MMHG | SYSTOLIC BLOOD PRESSURE: 121 MMHG

## 2022-01-26 VITALS
SYSTOLIC BLOOD PRESSURE: 125 MMHG | DIASTOLIC BLOOD PRESSURE: 72 MMHG | RESPIRATION RATE: 18 BRPM | HEART RATE: 68 BPM | OXYGEN SATURATION: 100 % | TEMPERATURE: 98 F

## 2022-01-26 DIAGNOSIS — K44.9 DIAPHRAGMATIC HERNIA WITHOUT OBSTRUCTION OR GANGRENE: ICD-10-CM

## 2022-01-26 DIAGNOSIS — B19.20 UNSPECIFIED VIRAL HEPATITIS C WITHOUT HEPATIC COMA: ICD-10-CM

## 2022-01-26 DIAGNOSIS — K86.1 OTHER CHRONIC PANCREATITIS: ICD-10-CM

## 2022-01-26 DIAGNOSIS — K25.5 CHRONIC OR UNSPECIFIED GASTRIC ULCER WITH PERFORATION: Chronic | ICD-10-CM

## 2022-01-26 DIAGNOSIS — N18.9 CHRONIC KIDNEY DISEASE, UNSPECIFIED: ICD-10-CM

## 2022-01-26 DIAGNOSIS — K29.70 GASTRITIS, UNSPECIFIED, WITHOUT BLEEDING: ICD-10-CM

## 2022-01-26 DIAGNOSIS — F10.10 ALCOHOL ABUSE, UNCOMPLICATED: ICD-10-CM

## 2022-01-26 DIAGNOSIS — Z29.9 ENCOUNTER FOR PROPHYLACTIC MEASURES, UNSPECIFIED: ICD-10-CM

## 2022-01-26 LAB
ALBUMIN SERPL ELPH-MCNC: 2.7 G/DL — LOW (ref 3.3–5)
ALP SERPL-CCNC: 95 U/L — SIGNIFICANT CHANGE UP (ref 40–120)
ALT FLD-CCNC: 16 U/L — SIGNIFICANT CHANGE UP (ref 12–78)
ANION GAP SERPL CALC-SCNC: 3 MMOL/L — LOW (ref 5–17)
AST SERPL-CCNC: 12 U/L — LOW (ref 15–37)
BILIRUB SERPL-MCNC: 0.2 MG/DL — SIGNIFICANT CHANGE UP (ref 0.2–1.2)
BUN SERPL-MCNC: 46 MG/DL — HIGH (ref 7–23)
CALCIUM SERPL-MCNC: 7.9 MG/DL — LOW (ref 8.5–10.1)
CHLORIDE SERPL-SCNC: 104 MMOL/L — SIGNIFICANT CHANGE UP (ref 96–108)
CO2 SERPL-SCNC: 32 MMOL/L — HIGH (ref 22–31)
CREAT SERPL-MCNC: 3.77 MG/DL — HIGH (ref 0.5–1.3)
GLUCOSE SERPL-MCNC: 108 MG/DL — HIGH (ref 70–99)
HCT VFR BLD CALC: 30 % — LOW (ref 39–50)
HGB BLD-MCNC: 8.5 G/DL — LOW (ref 13–17)
MAGNESIUM SERPL-MCNC: 4.6 MG/DL — HIGH (ref 1.6–2.6)
MCHC RBC-ENTMCNC: 24.2 PG — LOW (ref 27–34)
MCHC RBC-ENTMCNC: 28.3 G/DL — LOW (ref 32–36)
MCV RBC AUTO: 85.5 FL — SIGNIFICANT CHANGE UP (ref 80–100)
NRBC # BLD: 0 /100 WBCS — SIGNIFICANT CHANGE UP (ref 0–0)
PHOSPHATE SERPL-MCNC: 3.8 MG/DL — SIGNIFICANT CHANGE UP (ref 2.5–4.5)
PLATELET # BLD AUTO: 365 K/UL — SIGNIFICANT CHANGE UP (ref 150–400)
POTASSIUM SERPL-MCNC: 4.2 MMOL/L — SIGNIFICANT CHANGE UP (ref 3.5–5.3)
POTASSIUM SERPL-SCNC: 4.2 MMOL/L — SIGNIFICANT CHANGE UP (ref 3.5–5.3)
PROT SERPL-MCNC: 6.4 GM/DL — SIGNIFICANT CHANGE UP (ref 6–8.3)
RBC # BLD: 3.51 M/UL — LOW (ref 4.2–5.8)
RBC # FLD: 22.4 % — HIGH (ref 10.3–14.5)
SODIUM SERPL-SCNC: 139 MMOL/L — SIGNIFICANT CHANGE UP (ref 135–145)
WBC # BLD: 10.26 K/UL — SIGNIFICANT CHANGE UP (ref 3.8–10.5)
WBC # FLD AUTO: 10.26 K/UL — SIGNIFICANT CHANGE UP (ref 3.8–10.5)

## 2022-01-26 PROCEDURE — 71045 X-RAY EXAM CHEST 1 VIEW: CPT | Mod: 26

## 2022-01-26 PROCEDURE — 99223 1ST HOSP IP/OBS HIGH 75: CPT

## 2022-01-26 PROCEDURE — 99233 SBSQ HOSP IP/OBS HIGH 50: CPT

## 2022-01-26 PROCEDURE — 99239 HOSP IP/OBS DSCHRG MGMT >30: CPT

## 2022-01-26 RX ORDER — HEPARIN SODIUM 5000 [USP'U]/ML
5000 INJECTION INTRAVENOUS; SUBCUTANEOUS EVERY 12 HOURS
Refills: 0 | Status: DISCONTINUED | OUTPATIENT
Start: 2022-01-26 | End: 2022-02-02

## 2022-01-26 RX ORDER — IRON SUCROSE 20 MG/ML
200 INJECTION, SOLUTION INTRAVENOUS ONCE
Refills: 0 | Status: COMPLETED | OUTPATIENT
Start: 2022-01-26 | End: 2022-01-26

## 2022-01-26 RX ORDER — GABAPENTIN 400 MG/1
100 CAPSULE ORAL EVERY 12 HOURS
Refills: 0 | Status: DISCONTINUED | OUTPATIENT
Start: 2022-01-26 | End: 2022-02-02

## 2022-01-26 RX ORDER — SENNA PLUS 8.6 MG/1
2 TABLET ORAL AT BEDTIME
Refills: 0 | Status: DISCONTINUED | OUTPATIENT
Start: 2022-01-26 | End: 2022-02-02

## 2022-01-26 RX ORDER — ONDANSETRON 8 MG/1
4 TABLET, FILM COATED ORAL EVERY 8 HOURS
Refills: 0 | Status: DISCONTINUED | OUTPATIENT
Start: 2022-01-26 | End: 2022-02-02

## 2022-01-26 RX ORDER — CALCIUM ACETATE 667 MG
667 TABLET ORAL
Refills: 0 | Status: DISCONTINUED | OUTPATIENT
Start: 2022-01-26 | End: 2022-02-01

## 2022-01-26 RX ORDER — ACETAMINOPHEN 500 MG
650 TABLET ORAL EVERY 6 HOURS
Refills: 0 | Status: DISCONTINUED | OUTPATIENT
Start: 2022-01-26 | End: 2022-02-02

## 2022-01-26 RX ORDER — SODIUM BICARBONATE 1 MEQ/ML
650 SYRINGE (ML) INTRAVENOUS EVERY 6 HOURS
Refills: 0 | Status: DISCONTINUED | OUTPATIENT
Start: 2022-01-26 | End: 2022-02-02

## 2022-01-26 RX ORDER — SODIUM CHLORIDE 9 MG/ML
1000 INJECTION, SOLUTION INTRAVENOUS
Refills: 0 | Status: DISCONTINUED | OUTPATIENT
Start: 2022-01-26 | End: 2022-02-02

## 2022-01-26 RX ORDER — LIPASE/PROTEASE/AMYLASE 16-48-48K
2 CAPSULE,DELAYED RELEASE (ENTERIC COATED) ORAL
Refills: 0 | Status: DISCONTINUED | OUTPATIENT
Start: 2022-01-26 | End: 2022-02-02

## 2022-01-26 RX ORDER — TAMSULOSIN HYDROCHLORIDE 0.4 MG/1
0.4 CAPSULE ORAL AT BEDTIME
Refills: 0 | Status: DISCONTINUED | OUTPATIENT
Start: 2022-01-26 | End: 2022-02-02

## 2022-01-26 RX ORDER — PREGABALIN 225 MG/1
1000 CAPSULE ORAL DAILY
Refills: 0 | Status: DISCONTINUED | OUTPATIENT
Start: 2022-01-26 | End: 2022-02-02

## 2022-01-26 RX ORDER — OXYCODONE AND ACETAMINOPHEN 5; 325 MG/1; MG/1
1 TABLET ORAL EVERY 4 HOURS
Refills: 0 | Status: DISCONTINUED | OUTPATIENT
Start: 2022-01-26 | End: 2022-01-26

## 2022-01-26 RX ORDER — FOLIC ACID 0.8 MG
1 TABLET ORAL DAILY
Refills: 0 | Status: DISCONTINUED | OUTPATIENT
Start: 2022-01-26 | End: 2022-02-02

## 2022-01-26 RX ORDER — PANTOPRAZOLE SODIUM 20 MG/1
40 TABLET, DELAYED RELEASE ORAL
Refills: 0 | Status: DISCONTINUED | OUTPATIENT
Start: 2022-01-26 | End: 2022-02-02

## 2022-01-26 RX ORDER — SUCRALFATE 1 G
1 TABLET ORAL
Refills: 0 | Status: DISCONTINUED | OUTPATIENT
Start: 2022-01-26 | End: 2022-01-28

## 2022-01-26 RX ORDER — LANOLIN ALCOHOL/MO/W.PET/CERES
3 CREAM (GRAM) TOPICAL AT BEDTIME
Refills: 0 | Status: DISCONTINUED | OUTPATIENT
Start: 2022-01-26 | End: 2022-02-02

## 2022-01-26 RX ORDER — THIAMINE MONONITRATE (VIT B1) 100 MG
100 TABLET ORAL DAILY
Refills: 0 | Status: DISCONTINUED | OUTPATIENT
Start: 2022-01-26 | End: 2022-02-02

## 2022-01-26 RX ADMIN — Medication 1 GRAM(S): at 05:18

## 2022-01-26 RX ADMIN — Medication 1 GRAM(S): at 22:16

## 2022-01-26 RX ADMIN — OXYCODONE AND ACETAMINOPHEN 1 TABLET(S): 5; 325 TABLET ORAL at 16:59

## 2022-01-26 RX ADMIN — Medication 1 MILLIGRAM(S): at 12:01

## 2022-01-26 RX ADMIN — Medication 650 MILLIGRAM(S): at 18:25

## 2022-01-26 RX ADMIN — Medication 667 MILLIGRAM(S): at 20:24

## 2022-01-26 RX ADMIN — Medication 650 MILLIGRAM(S): at 02:19

## 2022-01-26 RX ADMIN — Medication 650 MILLIGRAM(S): at 11:52

## 2022-01-26 RX ADMIN — PREGABALIN 1000 MICROGRAM(S): 225 CAPSULE ORAL at 23:08

## 2022-01-26 RX ADMIN — HYDROMORPHONE HYDROCHLORIDE 0.5 MILLIGRAM(S): 2 INJECTION INTRAMUSCULAR; INTRAVENOUS; SUBCUTANEOUS at 09:43

## 2022-01-26 RX ADMIN — PANTOPRAZOLE SODIUM 40 MILLIGRAM(S): 20 TABLET, DELAYED RELEASE ORAL at 05:18

## 2022-01-26 RX ADMIN — Medication 2 CAPSULE(S): at 18:25

## 2022-01-26 RX ADMIN — OXYCODONE AND ACETAMINOPHEN 1 TABLET(S): 5; 325 TABLET ORAL at 12:03

## 2022-01-26 RX ADMIN — Medication 1 TABLET(S): at 22:16

## 2022-01-26 RX ADMIN — OXYCODONE AND ACETAMINOPHEN 1 TABLET(S): 5; 325 TABLET ORAL at 17:57

## 2022-01-26 RX ADMIN — Medication 667 MILLIGRAM(S): at 08:25

## 2022-01-26 RX ADMIN — Medication 1 GRAM(S): at 02:19

## 2022-01-26 RX ADMIN — SODIUM CHLORIDE 75 MILLILITER(S): 9 INJECTION, SOLUTION INTRAVENOUS at 23:09

## 2022-01-26 RX ADMIN — Medication 667 MILLIGRAM(S): at 12:01

## 2022-01-26 RX ADMIN — Medication 2 CAPSULE(S): at 10:46

## 2022-01-26 RX ADMIN — HYDROMORPHONE HYDROCHLORIDE 0.5 MILLIGRAM(S): 2 INJECTION INTRAMUSCULAR; INTRAVENOUS; SUBCUTANEOUS at 01:40

## 2022-01-26 RX ADMIN — Medication 650 MILLIGRAM(S): at 05:18

## 2022-01-26 RX ADMIN — Medication 1 GRAM(S): at 11:52

## 2022-01-26 RX ADMIN — TAMSULOSIN HYDROCHLORIDE 0.4 MILLIGRAM(S): 0.4 CAPSULE ORAL at 22:16

## 2022-01-26 RX ADMIN — OXYCODONE AND ACETAMINOPHEN 1 TABLET(S): 5; 325 TABLET ORAL at 12:34

## 2022-01-26 RX ADMIN — Medication 1 TABLET(S): at 11:52

## 2022-01-26 RX ADMIN — HYDROMORPHONE HYDROCHLORIDE 0.5 MILLIGRAM(S): 2 INJECTION INTRAMUSCULAR; INTRAVENOUS; SUBCUTANEOUS at 08:14

## 2022-01-26 RX ADMIN — Medication 1 GRAM(S): at 18:25

## 2022-01-26 RX ADMIN — SENNA PLUS 2 TABLET(S): 8.6 TABLET ORAL at 22:15

## 2022-01-26 RX ADMIN — Medication 100 MILLIGRAM(S): at 11:52

## 2022-01-26 RX ADMIN — HYDROMORPHONE HYDROCHLORIDE 0.5 MILLIGRAM(S): 2 INJECTION INTRAMUSCULAR; INTRAVENOUS; SUBCUTANEOUS at 01:25

## 2022-01-26 RX ADMIN — Medication 1 MILLIGRAM(S): at 22:16

## 2022-01-26 RX ADMIN — PANTOPRAZOLE SODIUM 40 MILLIGRAM(S): 20 TABLET, DELAYED RELEASE ORAL at 22:16

## 2022-01-26 RX ADMIN — Medication 650 MILLIGRAM(S): at 23:08

## 2022-01-26 RX ADMIN — IRON SUCROSE 200 MILLIGRAM(S): 20 INJECTION, SOLUTION INTRAVENOUS at 11:52

## 2022-01-26 RX ADMIN — Medication 2 CAPSULE(S): at 11:52

## 2022-01-26 RX ADMIN — PANTOPRAZOLE SODIUM 40 MILLIGRAM(S): 20 TABLET, DELAYED RELEASE ORAL at 18:25

## 2022-01-26 NOTE — H&P ADULT - NSHPREVIEWOFSYSTEMS_GEN_ALL_CORE
REVIEW OF SYSTEMS:    CONSTITUTIONAL: No weakness, fevers or chills  EYES/ENT: No visual changes;  No dysphagia; No sore throat; No rhinorrhea; No sinus pain/pressure  NECK: No pain or stiffness  RESPIRATORY: No cough, wheezing, hemoptysis; No shortness of breath  CARDIOVASCULAR: No chest pain or palpitations; No lower extremity edema  GASTROINTESTINAL: No abdominal or epigastric pain. + nausea, vomiting, or hematemesis; No diarrhea or constipation. No melena or hematochezia.  GENITOURINARY: No dysuria, frequency or hematuria  NEUROLOGICAL: No numbness or weakness  MSK: ambulates with  SKIN: No itching, burning, rashes, or lesions   All other review of systems is negative unless indicated above. REVIEW OF SYSTEMS:    CONSTITUTIONAL: No weakness, fevers or chills  EYES/ENT: No visual changes;  No dysphagia; No sore throat; No rhinorrhea; No sinus pain/pressure  NECK: No pain or stiffness  RESPIRATORY: No cough, wheezing, hemoptysis; No shortness of breath  CARDIOVASCULAR: No chest pain or palpitations; No lower extremity edema  GASTROINTESTINAL: No abdominal or epigastric pain. + nausea, vomiting, or hematemesis; No diarrhea or constipation. No melena or hematochezia.  GENITOURINARY: No dysuria, frequency or hematuria  NEUROLOGICAL: No numbness or weakness  MSK: ambulates without assistance  SKIN: No itching, burning, rashes, or lesions   All other review of systems is negative unless indicated above.

## 2022-01-26 NOTE — PROGRESS NOTE ADULT - SUBJECTIVE AND OBJECTIVE BOX
Patient is a 61y old  Male who presents with a chief complaint of vomiting, gastritis (25 Jan 2022 09:28)      INTERVAL HPI/OVERNIGHT EVENTS: pt reports that he was eating crackers and made his stomach feel better and was asking to eat solids. denies vomiting but nurses reports he had a few episodes overnight     MEDICATIONS  (STANDING):  calcium acetate 667 milliGRAM(s) Oral three times a day with meals  folic acid 1 milliGRAM(s) Oral daily  multivitamin 1 Tablet(s) Oral daily  pancrelipase  (CREON  6,000 Lipase Units) 2 Capsule(s) Oral three times a day with meals  pantoprazole  Injectable 40 milliGRAM(s) IV Push two times a day  senna 2 Tablet(s) Oral at bedtime  sodium bicarbonate 650 milliGRAM(s) Oral every 6 hours  sucralfate 1 Gram(s) Oral four times a day  tamsulosin 0.4 milliGRAM(s) Oral at bedtime  thiamine 100 milliGRAM(s) Oral daily    MEDICATIONS  (PRN):  ondansetron Injectable 4 milliGRAM(s) IV Push every 6 hours PRN Nausea and/or Vomiting  oxycodone    5 mG/acetaminophen 325 mG 1 Tablet(s) Oral every 4 hours PRN Severe Pain (7 - 10)      Allergies    No Known Allergies    Intolerances        REVIEW OF SYSTEMS:  CONSTITUTIONAL: No fever, weight loss, or fatigue  EYES: No eye pain, visual disturbances, or discharge  ENMT:  No difficulty hearing, tinnitus, vertigo; No sinus or throat pain  NECK: No pain or stiffness  BREASTS: No pain, masses, or nipple discharge  RESPIRATORY: No cough, wheezing, chills or hemoptysis; No shortness of breath  CARDIOVASCULAR: No chest pain, palpitations, dizziness, or leg swelling  GASTROINTESTINAL: No abdominal or epigastric pain. No nausea, vomiting, or hematemesis; No diarrhea or constipation. No melena or hematochezia.  GENITOURINARY: No dysuria, frequency, hematuria, or incontinence  NEUROLOGICAL: No headaches, memory loss, loss of strength, numbness, or tremors  SKIN: No itching, burning, rashes, or lesions   LYMPH NODES: No enlarged glands  ENDOCRINE: No heat or cold intolerance; No hair loss  MUSCULOSKELETAL: No joint pain or swelling; No muscle, back, or extremity pain  PSYCHIATRIC: No depression, anxiety, mood swings, or difficulty sleeping  HEME/LYMPH: No easy bruising, or bleeding gums  ALLERGY AND IMMUNOLOGIC: No hives or eczema    Vital Signs Last 24 Hrs  T(C): 36.3 (26 Jan 2022 11:00), Max: 36.8 (25 Jan 2022 22:00)  T(F): 97.4 (26 Jan 2022 11:00), Max: 98.2 (25 Jan 2022 22:00)  HR: 74 (26 Jan 2022 11:00) (72 - 91)  BP: 136/71 (26 Jan 2022 11:00) (117/69 - 155/70)  BP(mean): --  RR: 17 (26 Jan 2022 11:00) (17 - 18)  SpO2: 100% (26 Jan 2022 11:00) (96% - 100%)      PHYSICAL EXAM:  GENERAL: NAD, well-groomed, well-developed  HEAD:  Atraumatic, Normocephalic  EYES: EOMI, PERRLA, conjunctiva and sclera clear  ENMT: No tonsillar erythema, exudates, or enlargement; Moist mucous membranes, Good dentition, No lesions  NECK: Supple, No JVD, Normal thyroid  NERVOUS SYSTEM:  Alert & Oriented X3, Good concentration; Motor Strength 5/5 B/L upper and lower extremities; DTRs 2+ intact and symmetric  CHEST/LUNG: Clear to percussion bilaterally; No rales, rhonchi, wheezing, or rubs  HEART: Regular rate and rhythm; No murmurs, rubs, or gallops  ABDOMEN: Soft, Nontender, Nondistended; Bowel sounds present  EXTREMITIES:  2+ Peripheral Pulses, No clubbing, cyanosis, or edema  LYMPH: No lymphadenopathy noted  SKIN: No rashes or lesions    LABS:                        8.5    10.26 )-----------( 365      ( 26 Jan 2022 08:33 )             30.0     01-26    139  |  104  |  46<H>  ----------------------------<  108<H>  4.2   |  32<H>  |  3.77<H>    Ca    7.9<L>      26 Jan 2022 08:33  Phos  3.8     01-26  Mg     4.6     01-26    TPro  6.4  /  Alb  2.7<L>  /  TBili  0.2  /  DBili  x   /  AST  12<L>  /  ALT  16  /  AlkPhos  95  01-26    PT/INR - ( 24 Jan 2022 22:34 )   PT: 10.2 sec;   INR: 0.87 ratio         PTT - ( 24 Jan 2022 22:34 )  PTT:27.5 sec    CAPILLARY BLOOD GLUCOSE          RADIOLOGY & ADDITIONAL TESTS:    Imaging Personally Reviewed:  [ ] YES  [ ] NO    Consultant(s) Notes Reviewed:  [ ] YES  [ ] NO    Care Discussed with Consultants/Other Providers [ ] YES  [ ] NO
[facility] NEPHROLOGY SERVICES, [facility]  NEPHROLOGY AND HYPERTENSION  [address]  [address]  [place] [address]  [phone]    MD HALIE OLIVIA MD ANDREY GONCHARUK, MD MADHU KORRAPATI, MD YELENA ROSENBERG, MD BINNY KOSHY, MD CHRISTOPHER CAPUTO, MD JANICE ARREOLA MD          Patient events noted  Hiccups;   No sob    MEDICATIONS  (STANDING):  calcium acetate 667 milliGRAM(s) Oral three times a day with meals  folic acid 1 milliGRAM(s) Oral daily  multivitamin 1 Tablet(s) Oral daily  pancrelipase  (CREON  6,000 Lipase Units) 2 Capsule(s) Oral three times a day with meals  pantoprazole  Injectable 40 milliGRAM(s) IV Push two times a day  senna 2 Tablet(s) Oral at bedtime  sodium bicarbonate 650 milliGRAM(s) Oral every 6 hours  sucralfate 1 Gram(s) Oral four times a day  tamsulosin 0.4 milliGRAM(s) Oral at bedtime  thiamine 100 milliGRAM(s) Oral daily    MEDICATIONS  (PRN):  ondansetron Injectable 4 milliGRAM(s) IV Push every 6 hours PRN Nausea and/or Vomiting  oxycodone    5 mG/acetaminophen 325 mG 1 Tablet(s) Oral every 4 hours PRN Severe Pain (7 - 10)      PHYSICAL EXAM:      T(C): 36.7 (01-26-22 @ 18:33), Max: 36.7 (01-26-22 @ 18:33)  HR: 68 (01-26-22 @ 18:33) (68 - 81)  BP: 125/72 (01-26-22 @ 18:33) (117/69 - 136/71)  RR: 18 (01-26-22 @ 18:33) (17 - 18)  SpO2: 100% (01-26-22 @ 18:33) (99% - 100%)  Wt(kg): --  Lungs clear  Heart S1S2  Abd soft NT ND  Extremities:   tr edema                                    8.5    10.26 )-----------( 365      ( 26 Jan 2022 08:33 )             30.0     01-26    139  |  104  |  46<H>  ----------------------------<  108<H>  4.2   |  32<H>  |  3.77<H>    Ca    7.9<L>      26 Jan 2022 08:33  Phos  3.8     01-26  Mg     4.6     01-26    TPro  6.4  /  Alb  2.7<L>  /  TBili  0.2  /  DBili  x   /  AST  12<L>  /  ALT  16  /  AlkPhos  95  01-26      LIVER FUNCTIONS - ( 26 Jan 2022 08:33 )  Alb: 2.7 g/dL / Pro: 6.4 gm/dL / ALK PHOS: 95 U/L / ALT: 16 U/L / AST: 12 U/L / GGT: x           Creatinine Trend: 3.77<--, 4.22<--, 4.74<--, 3.90<--, 3.82<--, 3.93<--      Assessment   YUNIEL CKD 4; pre renal azotemia       Plan:  Maintenance IVF   GI follow up   IV Carin Dukes MD

## 2022-01-26 NOTE — H&P ADULT - PROBLEM SELECTOR PLAN 6
- monitor for now, Cr at his baseline when compared to his previous labs  - IV fluids as above  - consider neprho consult

## 2022-01-26 NOTE — H&P ADULT - PROBLEM SELECTOR PLAN 4
Assessment:  - patient denies ETOH use recently but hx of abuse in the past  - no ETOH level done  - physical exam shows no signs of withdrawal    Plan:  - monitor for now

## 2022-01-26 NOTE — PROGRESS NOTE ADULT - PROBLEM SELECTOR PLAN 1
gastritis + esophagitis  Zofran q6PRN, PPI iv BID,, carafate  IV fluids, advance to solids today as per pts request gastritis + esophagitis 2/2 hiatel hernia. pt denies etoh but no level was done on admission. no signs of withdrawal   Zofran q6PRN, PPI iv BID,, carafate  IV fluids, advance to solids today as per pts request  will get GI if no improvement   may need surgical op f.u but pt also not taking meds at home

## 2022-01-26 NOTE — H&P ADULT - PROBLEM SELECTOR PLAN 3
Assessment:  - hx of pancreatitis, unclear etiology however could be from chronic ETOH abuse in the past  - lipase 204  - CT from [place] shows no fat stranding in the pancreas    Plan:  - LR 75cc/hr fore rehydration  - monitor for now

## 2022-01-26 NOTE — H&P ADULT - HISTORY OF PRESENT ILLNESS
This is a 61 M with pmhx of ETOH abuse, chronic pancreatitis, Hep C, gout, gastric perf in 2019 presented to the ED at [facility] for abdominal pain and n/v. The patient was transferred to [facility] for "offloading" due to pandemic. Overall patient is a poor historian. He presented to the ED at [facility] for severe abdominal pain and n/v. The patient appears to have multiple visits for similar complaint, previously admitted in december. Had full GI work up, EGD (as per previous notes) showed hiatal hernia and esophageal thickening, suspected esophagitis. He now presents again for abdominal pain and n/v. States pain is in the epigastric area and radiates to the back, states it is severe burning. He also states eating food makes it feel better. He does not remember how many times he vomited. Denies blood in vomit. Unable to obtain medhx in regards to NSAID use or blood thinner use.

## 2022-01-26 NOTE — DISCHARGE NOTE NURSING/CASE MANAGEMENT/SOCIAL WORK - PATIENT PORTAL LINK FT
You can access the FollowMyHealth Patient Portal offered by [facility] by registering at the following website: [email or web address]. By joining [facility]’s FollowMyHealth portal, you will also be able to view your health information using other applications (apps) compatible with our system.

## 2022-01-26 NOTE — H&P ADULT - NSHPPHYSICALEXAM_GEN_ALL_CORE
PHYSICAL EXAM:  VITALS: Vital Signs Last 24 Hrs  T(C): 36.7 (26 Jan 2022 18:33), Max: 36.7 (26 Jan 2022 18:33)  T(F): 98 (26 Jan 2022 18:33), Max: 98 (26 Jan 2022 18:33)  HR: 68 (26 Jan 2022 18:33) (68 - 81)  BP: 125/72 (26 Jan 2022 18:33) (117/69 - 136/71)  BP(mean): --  RR: 18 (26 Jan 2022 18:33) (17 - 18)  SpO2: 100% (26 Jan 2022 18:33) (99% - 100%)  GENERAL: NAD, well-developed, well-nourished  HEAD:  Atraumatic, Normocephalic  EYES: EOMI, PERRL, conjunctiva and sclera clear  ENT: Moist Mucus Membranes present, no ulcers appreciated  NECK: Supple, No JVD  CHEST/LUNG: Clear to auscultation bilaterally; No wheezes, rales or rhonchi, no accessory muscle use  HEART: Regular rate and rhythm; No murmurs, rubs, or gallops, (+)S1, S2  ABDOMEN: Soft, Nontender, Nondistended; Normal Bowel sounds   EXTREMITIES:  2+ Peripheral Pulses, No clubbing, cyanosis, or edema  PSYCH: normal mood and affect  NEUROLOGY: AAOx3, non-focal  SKIN: No rashes or lesions PHYSICAL EXAM:  VITALS: Vital Signs Last 24 Hrs  T(C): 36.7 (26 Jan 2022 18:33), Max: 36.7 (26 Jan 2022 18:33)  T(F): 98 (26 Jan 2022 18:33), Max: 98 (26 Jan 2022 18:33)  HR: 68 (26 Jan 2022 18:33) (68 - 81)  BP: 125/72 (26 Jan 2022 18:33) (117/69 - 136/71)  BP(mean): --  RR: 18 (26 Jan 2022 18:33) (17 - 18)  SpO2: 100% (26 Jan 2022 18:33) (99% - 100%)  GENERAL: NAD, appears disheveled  HEAD:  Atraumatic, Normocephalic  EYES: EOMI, PERRL, conjunctiva and sclera clear  ENT: Moist Mucus Membranes present, no ulcers appreciated  NECK: Supple, No JVD  CHEST/LUNG: Clear to auscultation bilaterally; No wheezes, rales or rhonchi, no accessory muscle use  HEART: Regular rate and rhythm; No murmurs, rubs, or gallops, (+)S1, S2  ABDOMEN: Soft, tender to palpation throughout, Nondistended; Normal Bowel sounds   EXTREMITIES:  2+ Peripheral Pulses, No clubbing, cyanosis, or edema  PSYCH: normal mood and affect  NEUROLOGY: AAOx3, non-focal  SKIN: No rashes or lesions

## 2022-01-26 NOTE — H&P ADULT - NSHPSOCIALHISTORY_GEN_ALL_CORE
States no longer drinks ETOH, unable to describe how much he drank previously, denies tobacco use, denies illicit drug use

## 2022-01-26 NOTE — DISCHARGE NOTE NURSING/CASE MANAGEMENT/SOCIAL WORK - NSDCPEFALRISK_GEN_ALL_CORE
For information on Fall & Injury Prevention, visit: [email or web address] OR  [email or web address] OR  https://www.cdc.gov/steadi/patient.html

## 2022-01-26 NOTE — PROGRESS NOTE ADULT - ASSESSMENT
60 y/o M with hx of ETOH abuse, chronic pancreatitis, Hepatitis C, Gout, hx of gastric perforation in 2019, and hx of CKD with prior HD presents with nausea/vomiting.  Patient states that he started vomiting uncontrollably yesterday morning.  Denies bloody or bilious contents to the vomitus.  Patient reports significant abdominal pain to the epigastrium and also lower abdomen.  States that he does not drink alcohol anymore.  Recently discharged from [facility], had an EGD on 12/12 which reveal esophagitis and gastritis.  Vitals stable, labs benign.  Will admit to med surg.

## 2022-01-26 NOTE — H&P ADULT - ASSESSMENT
61 M with pmhx of ETOH abuse, chronic pancreatitis, Hep C, gout, gastric perf in 2019 presented to the ED at [place] for abdominal pain and n/v. Patient known to have similar complaints previously. Found to have esophagitis and gastritis. Admit for esophagitis management.

## 2022-01-26 NOTE — PATIENT PROFILE ADULT - FALL HARM RISK - HARM RISK INTERVENTIONS

## 2022-01-26 NOTE — PATIENT PROFILE ADULT - INTERNATIONAL TRAVEL
Chief complaint:   Chief Complaint   Patient presents with   • Office Visit   • Consultation     acne       HISTORY OF PRESENT ILLNESS     HPI    Acne started age 14-15.    Never took pills.     States he is not vaccinated.  States he and entire family had Covid.    Other significant problems:  There are no problems to display for this patient.      PAST MEDICAL, FAMILY AND SOCIAL HISTORY     Medications:  No current outpatient medications on file.     No current facility-administered medications for this visit.       Allergies:  ALLERGIES:  No Known Allergies    Past Medical  History/Surgeries:  No past medical history on file.    No past surgical history on file.    Family History:  No family history on file.    Social History:  Social History     Tobacco Use   • Smoking status: Never Smoker   • Smokeless tobacco: Never Used   Substance Use Topics   • Alcohol use: Never       LABS RESULTS:  Office Visit on 08/26/2019   Component Date Value Ref Range Status   • Color, UA 08/26/2019 Yellow   Final   • Clarity, UA 08/26/2019 Clear   Final   • POCT Glucose Urine 08/26/2019 Negative  Negative Final   • Bilirubin, UA 08/26/2019 Negative   Final   • POCT Ketones 08/26/2019 Negative  Negative Final   • Spec Grav, UA 08/26/2019 1.025   Final   • POCT Occult Blood 08/26/2019 Negative  Negative Final   • pH, UA 08/26/2019 6.5   Final   • POCT Protein 08/26/2019 Negative  Negative Final   • POCT Urobilinogen 08/26/2019 0.2  0 - 1 mg/dL Final   • Leukocytes, UA 08/26/2019 Negative   Final   • Nitrite, UA 08/26/2019 Negative   Final       REVIEW OF SYSTEMS     ROS    PHYSICAL EXAM       Vitals:  Visit Vitals  /70 (BP Location: LUE - Left upper extremity, Patient Position: Sitting, Cuff Size: Large Adult)   Pulse (!) 55   Temp 97.4 °F (36.3 °C) (Tympanic)   Resp 18   Ht 5' 8\" (1.727 m)   Wt 94.2 kg (207 lb 10.8 oz)   SpO2 100%   BMI 31.58 kg/m²       Physical Exam                Nodules   Broad based  scars.    ASSESSMENT/PLAN     I spent a total of 30 minutes on the day of the visit.  This includes review of medical record, examination, analysis, planning, counseling, documenting.    Nodulocystic acne  - ampicillin (PRINCIPEN) 500 MG capsule; One p.o. 3 times daily  Dispense: 90 capsule; Refill: 1  - benzoyl peroxide 5 % gel; Apply once every other day according to dryness for acne  Dispense: 45 g; Refill: 11  - adapalene (DIFFERIN) 0.1 % gel; Apply twice a week for acne. More frequently if tolerated.  NO PRESCRIPTION NEEDED.  Dispense: 45 g; Refill: 4      Return in 2 months (on 11/28/2021).    Patient Instructions   Benzoyl peroxide 5% and adapalene will help acne.  They both are somewhat drying.  The trick is use them both enough to cause a little dryness.  If too dry, then skip a treatment.    Dryness will go away by itself.  Do not apply moisturizers.  Oil may plug your pores, causing more acne.  These meds should be applied long term.    Dark spots will slowly clear.  I sent the prescriptions to your pharmacist, but you can find these on the shelf.  I suggest making another appointment for about 2 months.  Send a message if you have questions.                       No

## 2022-01-26 NOTE — H&P ADULT - PROBLEM SELECTOR PLAN 1
Assessment:  - patient presented to the hospital for burning abdominal pain and n/v, multiple similar complaints in the past  - CT abd/pelvis done yesterday from [place] showed hiatal hernia and reflux esophagitis, no bowel obstruction  - previous work up: High-grade obstruction, distal esophagus on esophagram, CT scan: There is a moderate hiatal hernia. There is moderate concentric low-attenuation wall thickening of the esophagus with prominent mucosal enhancement, suggestive of use esophagitis. EGD: reported no mass present, esophagitis, gastritis on note. No EGD report in the system  - patient likely to have worsening GERD and esophagitis causing his symptoms  - low suspicion for ACS: troponin neg x1, pending EKG    Plan:  - protonix 40mg BID  - GI cocktail PRN  - GI consult for evaluation of esophagitis and hiatal hernia, EGD report and if there is further work up indicated  - zofran prn for n/v  - f/u EKG

## 2022-01-26 NOTE — H&P ADULT - NSHPLABSRESULTS_GEN_ALL_CORE
8.5    10.26 )-----------( 365      ( 26 Jan 2022 08:33 )             30.0       01-26    139  |  104  |  46<H>  ----------------------------<  108<H>  4.2   |  32<H>  |  3.77<H>    Ca    7.9<L>      26 Jan 2022 08:33  Phos  3.8     01-26  Mg     4.6     01-26    TPro  6.4  /  Alb  2.7<L>  /  TBili  0.2  /  DBili  x   /  AST  12<L>  /  ALT  16  /  AlkPhos  95  01-26            PT/INR - ( 24 Jan 2022 22:34 )   PT: 10.2 sec;   INR: 0.87 ratio         PTT - ( 24 Jan 2022 22:34 )  PTT:27.5 sec

## 2022-01-27 LAB
ANION GAP SERPL CALC-SCNC: 5 MMOL/L — LOW (ref 7–14)
BUN SERPL-MCNC: 40 MG/DL — HIGH (ref 7–23)
CALCIUM SERPL-MCNC: 8.1 MG/DL — LOW (ref 8.4–10.5)
CHLORIDE SERPL-SCNC: 102 MMOL/L — SIGNIFICANT CHANGE UP (ref 98–107)
CO2 SERPL-SCNC: 33 MMOL/L — HIGH (ref 22–31)
CREAT SERPL-MCNC: 3.65 MG/DL — HIGH (ref 0.5–1.3)
GLUCOSE SERPL-MCNC: 107 MG/DL — HIGH (ref 70–99)
HCT VFR BLD CALC: 29.6 % — LOW (ref 39–50)
HGB BLD-MCNC: 8.6 G/DL — LOW (ref 13–17)
MAGNESIUM SERPL-MCNC: 3.7 MG/DL — HIGH (ref 1.6–2.6)
MCHC RBC-ENTMCNC: 24.4 PG — LOW (ref 27–34)
MCHC RBC-ENTMCNC: 29.1 GM/DL — LOW (ref 32–36)
MCV RBC AUTO: 84.1 FL — SIGNIFICANT CHANGE UP (ref 80–100)
NRBC # BLD: 0 /100 WBCS — SIGNIFICANT CHANGE UP
NRBC # FLD: 0 K/UL — SIGNIFICANT CHANGE UP
PHOSPHATE SERPL-MCNC: 3.8 MG/DL — SIGNIFICANT CHANGE UP (ref 2.5–4.5)
PLATELET # BLD AUTO: 374 K/UL — SIGNIFICANT CHANGE UP (ref 150–400)
POTASSIUM SERPL-MCNC: 4.1 MMOL/L — SIGNIFICANT CHANGE UP (ref 3.5–5.3)
POTASSIUM SERPL-SCNC: 4.1 MMOL/L — SIGNIFICANT CHANGE UP (ref 3.5–5.3)
RBC # BLD: 3.52 M/UL — LOW (ref 4.2–5.8)
RBC # FLD: 22.5 % — HIGH (ref 10.3–14.5)
SARS-COV-2 RNA SPEC QL NAA+PROBE: SIGNIFICANT CHANGE UP
SODIUM SERPL-SCNC: 140 MMOL/L — SIGNIFICANT CHANGE UP (ref 135–145)
WBC # BLD: 7.32 K/UL — SIGNIFICANT CHANGE UP (ref 3.8–10.5)
WBC # FLD AUTO: 7.32 K/UL — SIGNIFICANT CHANGE UP (ref 3.8–10.5)

## 2022-01-27 PROCEDURE — 93010 ELECTROCARDIOGRAM REPORT: CPT

## 2022-01-27 RX ORDER — INFLUENZA VIRUS VACCINE 15; 15; 15; 15 UG/.5ML; UG/.5ML; UG/.5ML; UG/.5ML
0.5 SUSPENSION INTRAMUSCULAR ONCE
Refills: 0 | Status: DISCONTINUED | OUTPATIENT
Start: 2022-01-27 | End: 2022-02-02

## 2022-01-27 RX ORDER — OXYCODONE AND ACETAMINOPHEN 5; 325 MG/1; MG/1
1 TABLET ORAL EVERY 6 HOURS
Refills: 0 | Status: DISCONTINUED | OUTPATIENT
Start: 2022-01-27 | End: 2022-02-02

## 2022-01-27 RX ORDER — METOCLOPRAMIDE HCL 10 MG
5 TABLET ORAL EVERY 8 HOURS
Refills: 0 | Status: DISCONTINUED | OUTPATIENT
Start: 2022-01-27 | End: 2022-02-02

## 2022-01-27 RX ORDER — FAMOTIDINE 10 MG/ML
20 INJECTION INTRAVENOUS AT BEDTIME
Refills: 0 | Status: DISCONTINUED | OUTPATIENT
Start: 2022-01-27 | End: 2022-02-02

## 2022-01-27 RX ADMIN — GABAPENTIN 100 MILLIGRAM(S): 400 CAPSULE ORAL at 19:00

## 2022-01-27 RX ADMIN — PANTOPRAZOLE SODIUM 40 MILLIGRAM(S): 20 TABLET, DELAYED RELEASE ORAL at 06:24

## 2022-01-27 RX ADMIN — Medication 1 GRAM(S): at 06:24

## 2022-01-27 RX ADMIN — Medication 650 MILLIGRAM(S): at 12:27

## 2022-01-27 RX ADMIN — Medication 650 MILLIGRAM(S): at 23:06

## 2022-01-27 RX ADMIN — Medication 2 CAPSULE(S): at 12:27

## 2022-01-27 RX ADMIN — GABAPENTIN 100 MILLIGRAM(S): 400 CAPSULE ORAL at 06:24

## 2022-01-27 RX ADMIN — Medication 1 GRAM(S): at 12:30

## 2022-01-27 RX ADMIN — Medication 667 MILLIGRAM(S): at 18:49

## 2022-01-27 RX ADMIN — Medication 667 MILLIGRAM(S): at 12:27

## 2022-01-27 RX ADMIN — Medication 1 TABLET(S): at 12:28

## 2022-01-27 RX ADMIN — Medication 1 GRAM(S): at 18:50

## 2022-01-27 RX ADMIN — PREGABALIN 1000 MICROGRAM(S): 225 CAPSULE ORAL at 12:29

## 2022-01-27 RX ADMIN — SENNA PLUS 2 TABLET(S): 8.6 TABLET ORAL at 21:52

## 2022-01-27 RX ADMIN — Medication 1 MILLIGRAM(S): at 12:28

## 2022-01-27 RX ADMIN — SODIUM CHLORIDE 75 MILLILITER(S): 9 INJECTION, SOLUTION INTRAVENOUS at 12:00

## 2022-01-27 RX ADMIN — HEPARIN SODIUM 5000 UNIT(S): 5000 INJECTION INTRAVENOUS; SUBCUTANEOUS at 18:51

## 2022-01-27 RX ADMIN — Medication 650 MILLIGRAM(S): at 18:51

## 2022-01-27 RX ADMIN — HEPARIN SODIUM 5000 UNIT(S): 5000 INJECTION INTRAVENOUS; SUBCUTANEOUS at 06:24

## 2022-01-27 RX ADMIN — TAMSULOSIN HYDROCHLORIDE 0.4 MILLIGRAM(S): 0.4 CAPSULE ORAL at 21:51

## 2022-01-27 RX ADMIN — Medication 650 MILLIGRAM(S): at 06:24

## 2022-01-27 RX ADMIN — FAMOTIDINE 20 MILLIGRAM(S): 10 INJECTION INTRAVENOUS at 21:51

## 2022-01-27 RX ADMIN — PANTOPRAZOLE SODIUM 40 MILLIGRAM(S): 20 TABLET, DELAYED RELEASE ORAL at 18:50

## 2022-01-27 RX ADMIN — Medication 1 GRAM(S): at 23:06

## 2022-01-27 RX ADMIN — Medication 2 CAPSULE(S): at 18:50

## 2022-01-27 RX ADMIN — Medication 100 MILLIGRAM(S): at 12:27

## 2022-01-27 NOTE — CONSULT NOTE ADULT - ASSESSMENT
61 M with pmhx of ETOH abuse, chronic pancreatitis, Hep C, gout, gastric perf in 2019 presented to the ED at [place] for abdominal pain and n/v.    Nausea/Vomiting  acute on chronic   suspect motility disorder vs ?mass on recent CT   consider CT Chest w/IV Contrast   for now continue PPI BID, Carafate QID and Pepcid QHS  Reglan PRN if QTc allows for it   may need possible repeat EGD  will discuss case with Motility specialist     Chronic Pancreatitis   from etoh use   cont creon   cholestyramine daily if diarrhea  pain control prn     I reviewed the overnight course of events on the unit, re-confirming the patient history. I discussed the care with the patient and their family. The plan of care was discussed with the physician assistant and modifications were made to the notation where appropriate. Differential diagnosis and plan of care discussed with patient after the evaluation. Advanced care planning was discussed with patient and family.  Advanced care planning forms were reviewed and discussed.  Risks, benefits and alternatives of gastroenterologic procedures were discussed in detail and all questions were answered. 35 minutes spent on total encounter of which more than fifty percent of the encounter was spent counseling and/or coordinating care by the attending physician.    61 M with pmhx of ETOH abuse, chronic pancreatitis, Hep C, gout, gastric perf in 2019 presented to the ED at [place] for abdominal pain and n/v.    Nausea/Vomiting  suspect motility disorder vs ?mass on recent CT Chest  recommend CT Chest w/IV Contrast   continue PPI BID, Carafate QID and Pepcid QHS  Reglan PRN if QTc allows for it   may need possible repeat EGD  will discuss case with Motility specialist     Chronic Pancreatitis   from etoh use   cont creon   cholestyramine daily if diarrhea  pain control prn     I reviewed the overnight course of events on the unit, re-confirming the patient history. I discussed the care with the patient and their family. The plan of care was discussed with the physician assistant and modifications were made to the notation where appropriate. Differential diagnosis and plan of care discussed with patient after the evaluation. Advanced care planning was discussed with patient and family.  Advanced care planning forms were reviewed and discussed.  Risks, benefits and alternatives of gastroenterologic procedures were discussed in detail and all questions were answered. 35 minutes spent on total encounter of which more than fifty percent of the encounter was spent counseling and/or coordinating care by the attending physician.    61 M with pmhx of ETOH abuse, chronic pancreatitis, Hep C, gout, gastric perf in 2019 presented to the ED at [place] for abdominal pain and n/v.    Nausea/Vomiting  suspect motility disorder vs ?mass on recent CT Chest  recommend CT Chest w/IV Contrast   continue PPI BID, Carafate QID and Pepcid QHS  Reglan PRN if QTc allows for it   may need possible repeat EGD    Chronic Pancreatitis   from etoh use   cont creon   cholestyramine daily if diarrhea  pain control prn     I reviewed the overnight course of events on the unit, re-confirming the patient history. I discussed the care with the patient and their family. The plan of care was discussed with the physician assistant and modifications were made to the notation where appropriate. Differential diagnosis and plan of care discussed with patient after the evaluation. Advanced care planning was discussed with patient and family.  Advanced care planning forms were reviewed and discussed.  Risks, benefits and alternatives of gastroenterologic procedures were discussed in detail and all questions were answered. 35 minutes spent on total encounter of which more than fifty percent of the encounter was spent counseling and/or coordinating care by the attending physician.    61 M with pmhx of ETOH abuse, chronic pancreatitis, Hep C, gout, gastric perf in 2019 presented to the ED at [place] for abdominal pain and n/v.    Nausea/Vomiting  suspect motility disorder vs ?mass on recent CT Chest  recommend CT Chest w/IV Contrast   continue PPI BID, Carafate QID and Pepcid QHS  Reglan PRN if QTc allows for it   NPO p MN for possible EGD tomorrow    Chronic Pancreatitis   from etoh use   cont creon   cholestyramine daily if diarrhea  pain control prn     I reviewed the overnight course of events on the unit, re-confirming the patient history. I discussed the care with the patient and their family. The plan of care was discussed with the physician assistant and modifications were made to the notation where appropriate. Differential diagnosis and plan of care discussed with patient after the evaluation. Advanced care planning was discussed with patient and family.  Advanced care planning forms were reviewed and discussed.  Risks, benefits and alternatives of gastroenterologic procedures were discussed in detail and all questions were answered. 35 minutes spent on total encounter of which more than fifty percent of the encounter was spent counseling and/or coordinating care by the attending physician.

## 2022-01-27 NOTE — PATIENT PROFILE ADULT - FALL HARM RISK - HARM RISK INTERVENTIONS

## 2022-01-28 ENCOUNTER — RESULT REVIEW (OUTPATIENT)
Age: 62
End: 2022-01-28

## 2022-01-28 LAB
ANION GAP SERPL CALC-SCNC: 8 MMOL/L — SIGNIFICANT CHANGE UP (ref 7–14)
BILIRUB SERPL-MCNC: <0.2 MG/DL — SIGNIFICANT CHANGE UP (ref 0.2–1.2)
BUN SERPL-MCNC: 34 MG/DL — HIGH (ref 7–23)
CALCIUM SERPL-MCNC: 8.2 MG/DL — LOW (ref 8.4–10.5)
CHLORIDE SERPL-SCNC: 105 MMOL/L — SIGNIFICANT CHANGE UP (ref 98–107)
CO2 SERPL-SCNC: 29 MMOL/L — SIGNIFICANT CHANGE UP (ref 22–31)
CREAT SERPL-MCNC: 3.57 MG/DL — HIGH (ref 0.5–1.3)
GLUCOSE SERPL-MCNC: 99 MG/DL — SIGNIFICANT CHANGE UP (ref 70–99)
HCT VFR BLD CALC: 27.7 % — LOW (ref 39–50)
HGB BLD-MCNC: 7.9 G/DL — LOW (ref 13–17)
INR BLD: 0.99 RATIO — SIGNIFICANT CHANGE UP (ref 0.88–1.16)
MAGNESIUM SERPL-MCNC: 3.3 MG/DL — HIGH (ref 1.6–2.6)
MCHC RBC-ENTMCNC: 24.9 PG — LOW (ref 27–34)
MCHC RBC-ENTMCNC: 28.5 GM/DL — LOW (ref 32–36)
MCV RBC AUTO: 87.4 FL — SIGNIFICANT CHANGE UP (ref 80–100)
MELD SCORE WITH DIALYSIS: 20 POINTS — SIGNIFICANT CHANGE UP
MELD SCORE WITHOUT DIALYSIS: 19 POINTS — SIGNIFICANT CHANGE UP
NRBC # BLD: 0 /100 WBCS — SIGNIFICANT CHANGE UP
NRBC # FLD: 0 K/UL — SIGNIFICANT CHANGE UP
PHOSPHATE SERPL-MCNC: 3.4 MG/DL — SIGNIFICANT CHANGE UP (ref 2.5–4.5)
PLATELET # BLD AUTO: 350 K/UL — SIGNIFICANT CHANGE UP (ref 150–400)
POTASSIUM SERPL-MCNC: 3.9 MMOL/L — SIGNIFICANT CHANGE UP (ref 3.5–5.3)
POTASSIUM SERPL-SCNC: 3.9 MMOL/L — SIGNIFICANT CHANGE UP (ref 3.5–5.3)
PROTHROM AB SERPL-ACNC: 11.4 SEC — SIGNIFICANT CHANGE UP (ref 10.6–13.6)
RBC # BLD: 3.17 M/UL — LOW (ref 4.2–5.8)
RBC # FLD: 22.2 % — HIGH (ref 10.3–14.5)
SODIUM SERPL-SCNC: 142 MMOL/L — SIGNIFICANT CHANGE UP (ref 135–145)
WBC # BLD: 6.8 K/UL — SIGNIFICANT CHANGE UP (ref 3.8–10.5)
WBC # FLD AUTO: 6.8 K/UL — SIGNIFICANT CHANGE UP (ref 3.8–10.5)

## 2022-01-28 PROCEDURE — 88305 TISSUE EXAM BY PATHOLOGIST: CPT | Mod: 26

## 2022-01-28 RX ORDER — SUCRALFATE 1 G
1 TABLET ORAL
Refills: 0 | Status: DISCONTINUED | OUTPATIENT
Start: 2022-01-28 | End: 2022-02-02

## 2022-01-28 RX ORDER — OXYCODONE AND ACETAMINOPHEN 5; 325 MG/1; MG/1
2 TABLET ORAL EVERY 6 HOURS
Refills: 0 | Status: DISCONTINUED | OUTPATIENT
Start: 2022-01-28 | End: 2022-02-02

## 2022-01-28 RX ADMIN — Medication 667 MILLIGRAM(S): at 17:36

## 2022-01-28 RX ADMIN — PREGABALIN 1000 MICROGRAM(S): 225 CAPSULE ORAL at 17:36

## 2022-01-28 RX ADMIN — OXYCODONE AND ACETAMINOPHEN 1 TABLET(S): 5; 325 TABLET ORAL at 10:15

## 2022-01-28 RX ADMIN — SODIUM CHLORIDE 75 MILLILITER(S): 9 INJECTION, SOLUTION INTRAVENOUS at 03:55

## 2022-01-28 RX ADMIN — Medication 1 TABLET(S): at 17:36

## 2022-01-28 RX ADMIN — Medication 1 MILLIGRAM(S): at 17:36

## 2022-01-28 RX ADMIN — GABAPENTIN 100 MILLIGRAM(S): 400 CAPSULE ORAL at 06:14

## 2022-01-28 RX ADMIN — PANTOPRAZOLE SODIUM 40 MILLIGRAM(S): 20 TABLET, DELAYED RELEASE ORAL at 17:37

## 2022-01-28 RX ADMIN — Medication 100 MILLIGRAM(S): at 17:36

## 2022-01-28 RX ADMIN — Medication 2 CAPSULE(S): at 17:37

## 2022-01-28 RX ADMIN — PANTOPRAZOLE SODIUM 40 MILLIGRAM(S): 20 TABLET, DELAYED RELEASE ORAL at 06:14

## 2022-01-28 RX ADMIN — OXYCODONE AND ACETAMINOPHEN 2 TABLET(S): 5; 325 TABLET ORAL at 15:46

## 2022-01-28 RX ADMIN — Medication 1 GRAM(S): at 18:58

## 2022-01-28 RX ADMIN — Medication 650 MILLIGRAM(S): at 17:37

## 2022-01-28 RX ADMIN — SODIUM CHLORIDE 75 MILLILITER(S): 9 INJECTION, SOLUTION INTRAVENOUS at 15:47

## 2022-01-28 RX ADMIN — HEPARIN SODIUM 5000 UNIT(S): 5000 INJECTION INTRAVENOUS; SUBCUTANEOUS at 17:37

## 2022-01-28 RX ADMIN — GABAPENTIN 100 MILLIGRAM(S): 400 CAPSULE ORAL at 17:37

## 2022-01-28 NOTE — PRE-OP CHECKLIST - NSBLOODTRANS_GEN_A_CORE_SIUH
Susi presents for follow-up of diverticulitis and rectal bleeding.  The first week in September she developed lower abdominal pain with pressure and cramping similar to previous episodes of diverticulitis in the past.  She states that her bowels were somewhat loose, and she saw bright red blood when wiping.  She felt like this was a typical diverticulitis flare for her.  She went to see Dr. De Los Santos who prescribed Flagyl and Cipro for 10 days.  She took this for 7 days and was not able to complete the last 3.  Today she is doing better, she has had no further pain.  Her bowels are somewhat normal, with no further bright red blood per rectum.  Her last documented episode of diverticulitis was in 2015 with slight wall thickening of the mid proximal sigmoid colon with subtle stranding of adjacent fat.  Her last colonoscopy was in 2018 by Dr. Kendrick, this reveals left-sided diverticular disease.  Today she is feeling much better, her appetite has returned, her abdominal pain has resolved, and her diet is back to normal.  Otherwise she is doing well with no new GI complaints.  MB   12/1/2020 ARIANNE presents for follow-up of diverticulitis.  She is doing much better after completing her antibiotics.  She is started low-dose psyllium fiber capsules at night with a normal bowel movement daily.  She occasionally has pain and spasm in her left lower quadrant but no further flares.  Her last colonoscopy was in 2018 with diverticulosis.  Today she is doing well otherwise. MB 4-27-21 The patient presents today for follow-up of left lower quadrant abdominal pain.  She does have a history of diverticulitis.  She feels that over the past several months, she has had 2 flares of left lower quadrant abdominal pain.  She has not been drinking as much water as she has been previously, and she has not been exercising as much.  She does not feel constipated, but she does think her bowels have changed.  When she feels this pain, she goes on a clear liquid diet, and her pain usually goes away.  She has not had fever or chills, and she has not required another episode of antibiotics.  She has not seen any blood in her stool.  We have had a discussion today that this likely represents spasm from her diverticular disease along with possible scar tissue.  She has been taking her psyllium on a daily basis, but she is not taking MiraLAX daily.  We have discussed adding this in on a more regular basis.  She is to call us if she has a flare of what appears to be diverticulitis that requires antibiotics.  no...

## 2022-01-29 LAB
ALBUMIN SERPL ELPH-MCNC: 3.2 G/DL — LOW (ref 3.3–5)
ALP SERPL-CCNC: 80 U/L — SIGNIFICANT CHANGE UP (ref 40–120)
ALT FLD-CCNC: 6 U/L — SIGNIFICANT CHANGE UP (ref 4–41)
ANION GAP SERPL CALC-SCNC: 6 MMOL/L — LOW (ref 7–14)
AST SERPL-CCNC: 13 U/L — SIGNIFICANT CHANGE UP (ref 4–40)
BILIRUB DIRECT SERPL-MCNC: <0.2 MG/DL — SIGNIFICANT CHANGE UP (ref 0–0.3)
BILIRUB INDIRECT FLD-MCNC: SIGNIFICANT CHANGE UP MG/DL (ref 0–1)
BILIRUB SERPL-MCNC: <0.2 MG/DL — SIGNIFICANT CHANGE UP (ref 0.2–1.2)
BLD GP AB SCN SERPL QL: NEGATIVE — SIGNIFICANT CHANGE UP
BUN SERPL-MCNC: 30 MG/DL — HIGH (ref 7–23)
CALCIUM SERPL-MCNC: 7.8 MG/DL — LOW (ref 8.4–10.5)
CHLORIDE SERPL-SCNC: 106 MMOL/L — SIGNIFICANT CHANGE UP (ref 98–107)
CO2 SERPL-SCNC: 29 MMOL/L — SIGNIFICANT CHANGE UP (ref 22–31)
CREAT SERPL-MCNC: 3.23 MG/DL — HIGH (ref 0.5–1.3)
GLUCOSE SERPL-MCNC: 96 MG/DL — SIGNIFICANT CHANGE UP (ref 70–99)
HCT VFR BLD CALC: 25.8 % — LOW (ref 39–50)
HGB BLD-MCNC: 7.6 G/DL — LOW (ref 13–17)
MAGNESIUM SERPL-MCNC: 3 MG/DL — HIGH (ref 1.6–2.6)
MCHC RBC-ENTMCNC: 24.8 PG — LOW (ref 27–34)
MCHC RBC-ENTMCNC: 29.5 GM/DL — LOW (ref 32–36)
MCV RBC AUTO: 84 FL — SIGNIFICANT CHANGE UP (ref 80–100)
NRBC # BLD: 0 /100 WBCS — SIGNIFICANT CHANGE UP
NRBC # FLD: 0 K/UL — SIGNIFICANT CHANGE UP
PHOSPHATE SERPL-MCNC: 3.8 MG/DL — SIGNIFICANT CHANGE UP (ref 2.5–4.5)
PLATELET # BLD AUTO: 305 K/UL — SIGNIFICANT CHANGE UP (ref 150–400)
POTASSIUM SERPL-MCNC: 3.8 MMOL/L — SIGNIFICANT CHANGE UP (ref 3.5–5.3)
POTASSIUM SERPL-SCNC: 3.8 MMOL/L — SIGNIFICANT CHANGE UP (ref 3.5–5.3)
PROT SERPL-MCNC: 5.6 G/DL — LOW (ref 6–8.3)
RBC # BLD: 3.07 M/UL — LOW (ref 4.2–5.8)
RBC # FLD: 22.6 % — HIGH (ref 10.3–14.5)
RH IG SCN BLD-IMP: POSITIVE — SIGNIFICANT CHANGE UP
SODIUM SERPL-SCNC: 141 MMOL/L — SIGNIFICANT CHANGE UP (ref 135–145)
WBC # BLD: 6.28 K/UL — SIGNIFICANT CHANGE UP (ref 3.8–10.5)
WBC # FLD AUTO: 6.28 K/UL — SIGNIFICANT CHANGE UP (ref 3.8–10.5)

## 2022-01-29 RX ADMIN — TAMSULOSIN HYDROCHLORIDE 0.4 MILLIGRAM(S): 0.4 CAPSULE ORAL at 00:09

## 2022-01-29 RX ADMIN — OXYCODONE AND ACETAMINOPHEN 2 TABLET(S): 5; 325 TABLET ORAL at 08:19

## 2022-01-29 RX ADMIN — Medication 650 MILLIGRAM(S): at 11:49

## 2022-01-29 RX ADMIN — GABAPENTIN 100 MILLIGRAM(S): 400 CAPSULE ORAL at 05:43

## 2022-01-29 RX ADMIN — FAMOTIDINE 20 MILLIGRAM(S): 10 INJECTION INTRAVENOUS at 22:02

## 2022-01-29 RX ADMIN — HEPARIN SODIUM 5000 UNIT(S): 5000 INJECTION INTRAVENOUS; SUBCUTANEOUS at 05:43

## 2022-01-29 RX ADMIN — Medication 650 MILLIGRAM(S): at 01:15

## 2022-01-29 RX ADMIN — PANTOPRAZOLE SODIUM 40 MILLIGRAM(S): 20 TABLET, DELAYED RELEASE ORAL at 05:44

## 2022-01-29 RX ADMIN — Medication 1 TABLET(S): at 11:50

## 2022-01-29 RX ADMIN — Medication 650 MILLIGRAM(S): at 23:52

## 2022-01-29 RX ADMIN — OXYCODONE AND ACETAMINOPHEN 2 TABLET(S): 5; 325 TABLET ORAL at 15:19

## 2022-01-29 RX ADMIN — Medication 2 CAPSULE(S): at 08:20

## 2022-01-29 RX ADMIN — OXYCODONE AND ACETAMINOPHEN 2 TABLET(S): 5; 325 TABLET ORAL at 08:53

## 2022-01-29 RX ADMIN — OXYCODONE AND ACETAMINOPHEN 2 TABLET(S): 5; 325 TABLET ORAL at 20:45

## 2022-01-29 RX ADMIN — PANTOPRAZOLE SODIUM 40 MILLIGRAM(S): 20 TABLET, DELAYED RELEASE ORAL at 17:26

## 2022-01-29 RX ADMIN — OXYCODONE AND ACETAMINOPHEN 2 TABLET(S): 5; 325 TABLET ORAL at 14:23

## 2022-01-29 RX ADMIN — OXYCODONE AND ACETAMINOPHEN 2 TABLET(S): 5; 325 TABLET ORAL at 21:15

## 2022-01-29 RX ADMIN — Medication 667 MILLIGRAM(S): at 08:20

## 2022-01-29 RX ADMIN — Medication 1 GRAM(S): at 11:49

## 2022-01-29 RX ADMIN — Medication 1 GRAM(S): at 17:29

## 2022-01-29 RX ADMIN — Medication 650 MILLIGRAM(S): at 05:42

## 2022-01-29 RX ADMIN — Medication 1 GRAM(S): at 23:52

## 2022-01-29 RX ADMIN — PREGABALIN 1000 MICROGRAM(S): 225 CAPSULE ORAL at 11:51

## 2022-01-29 RX ADMIN — Medication 667 MILLIGRAM(S): at 17:27

## 2022-01-29 RX ADMIN — SENNA PLUS 2 TABLET(S): 8.6 TABLET ORAL at 22:02

## 2022-01-29 RX ADMIN — Medication 100 MILLIGRAM(S): at 11:50

## 2022-01-29 RX ADMIN — Medication 1 MILLIGRAM(S): at 11:50

## 2022-01-29 RX ADMIN — SODIUM CHLORIDE 75 MILLILITER(S): 9 INJECTION, SOLUTION INTRAVENOUS at 06:14

## 2022-01-29 RX ADMIN — Medication 2 CAPSULE(S): at 17:27

## 2022-01-29 RX ADMIN — OXYCODONE AND ACETAMINOPHEN 2 TABLET(S): 5; 325 TABLET ORAL at 00:19

## 2022-01-29 RX ADMIN — Medication 667 MILLIGRAM(S): at 11:50

## 2022-01-29 RX ADMIN — HEPARIN SODIUM 5000 UNIT(S): 5000 INJECTION INTRAVENOUS; SUBCUTANEOUS at 17:26

## 2022-01-29 RX ADMIN — Medication 1 GRAM(S): at 02:22

## 2022-01-29 RX ADMIN — Medication 2 CAPSULE(S): at 11:50

## 2022-01-29 RX ADMIN — GABAPENTIN 100 MILLIGRAM(S): 400 CAPSULE ORAL at 17:29

## 2022-01-29 RX ADMIN — FAMOTIDINE 20 MILLIGRAM(S): 10 INJECTION INTRAVENOUS at 00:08

## 2022-01-29 RX ADMIN — TAMSULOSIN HYDROCHLORIDE 0.4 MILLIGRAM(S): 0.4 CAPSULE ORAL at 22:02

## 2022-01-29 RX ADMIN — Medication 650 MILLIGRAM(S): at 17:28

## 2022-01-30 LAB
ALBUMIN SERPL ELPH-MCNC: 3.2 G/DL — LOW (ref 3.3–5)
ALP SERPL-CCNC: 84 U/L — SIGNIFICANT CHANGE UP (ref 40–120)
ALT FLD-CCNC: 7 U/L — SIGNIFICANT CHANGE UP (ref 4–41)
ANION GAP SERPL CALC-SCNC: 6 MMOL/L — LOW (ref 7–14)
AST SERPL-CCNC: 10 U/L — SIGNIFICANT CHANGE UP (ref 4–40)
BILIRUB SERPL-MCNC: <0.2 MG/DL — SIGNIFICANT CHANGE UP (ref 0.2–1.2)
BLD GP AB SCN SERPL QL: NEGATIVE — SIGNIFICANT CHANGE UP
BUN SERPL-MCNC: 24 MG/DL — HIGH (ref 7–23)
CALCIUM SERPL-MCNC: 8.4 MG/DL — SIGNIFICANT CHANGE UP (ref 8.4–10.5)
CHLORIDE SERPL-SCNC: 106 MMOL/L — SIGNIFICANT CHANGE UP (ref 98–107)
CO2 SERPL-SCNC: 29 MMOL/L — SIGNIFICANT CHANGE UP (ref 22–31)
CREAT SERPL-MCNC: 3.19 MG/DL — HIGH (ref 0.5–1.3)
GLUCOSE SERPL-MCNC: 101 MG/DL — HIGH (ref 70–99)
HCT VFR BLD CALC: 27.8 % — LOW (ref 39–50)
HGB BLD-MCNC: 8 G/DL — LOW (ref 13–17)
MAGNESIUM SERPL-MCNC: 2.6 MG/DL — SIGNIFICANT CHANGE UP (ref 1.6–2.6)
MCHC RBC-ENTMCNC: 24.3 PG — LOW (ref 27–34)
MCHC RBC-ENTMCNC: 28.8 GM/DL — LOW (ref 32–36)
MCV RBC AUTO: 84.5 FL — SIGNIFICANT CHANGE UP (ref 80–100)
NRBC # BLD: 0 /100 WBCS — SIGNIFICANT CHANGE UP
NRBC # FLD: 0 K/UL — SIGNIFICANT CHANGE UP
PHOSPHATE SERPL-MCNC: 3.7 MG/DL — SIGNIFICANT CHANGE UP (ref 2.5–4.5)
PLATELET # BLD AUTO: 319 K/UL — SIGNIFICANT CHANGE UP (ref 150–400)
POTASSIUM SERPL-MCNC: 3.9 MMOL/L — SIGNIFICANT CHANGE UP (ref 3.5–5.3)
POTASSIUM SERPL-SCNC: 3.9 MMOL/L — SIGNIFICANT CHANGE UP (ref 3.5–5.3)
PROT SERPL-MCNC: 6.1 G/DL — SIGNIFICANT CHANGE UP (ref 6–8.3)
RBC # BLD: 3.29 M/UL — LOW (ref 4.2–5.8)
RBC # FLD: 22.5 % — HIGH (ref 10.3–14.5)
RH IG SCN BLD-IMP: POSITIVE — SIGNIFICANT CHANGE UP
SODIUM SERPL-SCNC: 141 MMOL/L — SIGNIFICANT CHANGE UP (ref 135–145)
WBC # BLD: 5.99 K/UL — SIGNIFICANT CHANGE UP (ref 3.8–10.5)
WBC # FLD AUTO: 5.99 K/UL — SIGNIFICANT CHANGE UP (ref 3.8–10.5)

## 2022-01-30 RX ADMIN — Medication 650 MILLIGRAM(S): at 17:16

## 2022-01-30 RX ADMIN — HEPARIN SODIUM 5000 UNIT(S): 5000 INJECTION INTRAVENOUS; SUBCUTANEOUS at 06:15

## 2022-01-30 RX ADMIN — GABAPENTIN 100 MILLIGRAM(S): 400 CAPSULE ORAL at 17:16

## 2022-01-30 RX ADMIN — FAMOTIDINE 20 MILLIGRAM(S): 10 INJECTION INTRAVENOUS at 21:51

## 2022-01-30 RX ADMIN — OXYCODONE AND ACETAMINOPHEN 2 TABLET(S): 5; 325 TABLET ORAL at 15:49

## 2022-01-30 RX ADMIN — PANTOPRAZOLE SODIUM 40 MILLIGRAM(S): 20 TABLET, DELAYED RELEASE ORAL at 17:16

## 2022-01-30 RX ADMIN — Medication 1 GRAM(S): at 17:16

## 2022-01-30 RX ADMIN — Medication 1 MILLIGRAM(S): at 12:14

## 2022-01-30 RX ADMIN — SENNA PLUS 2 TABLET(S): 8.6 TABLET ORAL at 21:51

## 2022-01-30 RX ADMIN — OXYCODONE AND ACETAMINOPHEN 2 TABLET(S): 5; 325 TABLET ORAL at 02:58

## 2022-01-30 RX ADMIN — Medication 1 GRAM(S): at 12:12

## 2022-01-30 RX ADMIN — Medication 650 MILLIGRAM(S): at 12:13

## 2022-01-30 RX ADMIN — Medication 1 GRAM(S): at 23:21

## 2022-01-30 RX ADMIN — Medication 667 MILLIGRAM(S): at 12:13

## 2022-01-30 RX ADMIN — SODIUM CHLORIDE 75 MILLILITER(S): 9 INJECTION, SOLUTION INTRAVENOUS at 12:11

## 2022-01-30 RX ADMIN — Medication 100 MILLIGRAM(S): at 12:13

## 2022-01-30 RX ADMIN — OXYCODONE AND ACETAMINOPHEN 2 TABLET(S): 5; 325 TABLET ORAL at 22:10

## 2022-01-30 RX ADMIN — Medication 1 TABLET(S): at 12:13

## 2022-01-30 RX ADMIN — GABAPENTIN 100 MILLIGRAM(S): 400 CAPSULE ORAL at 06:14

## 2022-01-30 RX ADMIN — OXYCODONE AND ACETAMINOPHEN 2 TABLET(S): 5; 325 TABLET ORAL at 03:28

## 2022-01-30 RX ADMIN — HEPARIN SODIUM 5000 UNIT(S): 5000 INJECTION INTRAVENOUS; SUBCUTANEOUS at 17:16

## 2022-01-30 RX ADMIN — Medication 2 CAPSULE(S): at 17:17

## 2022-01-30 RX ADMIN — Medication 667 MILLIGRAM(S): at 17:17

## 2022-01-30 RX ADMIN — PREGABALIN 1000 MICROGRAM(S): 225 CAPSULE ORAL at 12:16

## 2022-01-30 RX ADMIN — Medication 650 MILLIGRAM(S): at 06:14

## 2022-01-30 RX ADMIN — Medication 650 MILLIGRAM(S): at 23:21

## 2022-01-30 RX ADMIN — OXYCODONE AND ACETAMINOPHEN 2 TABLET(S): 5; 325 TABLET ORAL at 16:30

## 2022-01-30 RX ADMIN — Medication 1 GRAM(S): at 06:14

## 2022-01-30 RX ADMIN — SODIUM CHLORIDE 75 MILLILITER(S): 9 INJECTION, SOLUTION INTRAVENOUS at 08:29

## 2022-01-30 RX ADMIN — Medication 2 CAPSULE(S): at 12:14

## 2022-01-30 RX ADMIN — OXYCODONE AND ACETAMINOPHEN 2 TABLET(S): 5; 325 TABLET ORAL at 09:29

## 2022-01-30 RX ADMIN — PANTOPRAZOLE SODIUM 40 MILLIGRAM(S): 20 TABLET, DELAYED RELEASE ORAL at 06:15

## 2022-01-30 RX ADMIN — OXYCODONE AND ACETAMINOPHEN 2 TABLET(S): 5; 325 TABLET ORAL at 10:00

## 2022-01-30 RX ADMIN — Medication 667 MILLIGRAM(S): at 08:31

## 2022-01-30 RX ADMIN — TAMSULOSIN HYDROCHLORIDE 0.4 MILLIGRAM(S): 0.4 CAPSULE ORAL at 21:51

## 2022-01-30 RX ADMIN — Medication 2 CAPSULE(S): at 08:31

## 2022-01-31 ENCOUNTER — TRANSCRIPTION ENCOUNTER (OUTPATIENT)
Age: 62
End: 2022-01-31

## 2022-01-31 LAB
ALBUMIN SERPL ELPH-MCNC: 3.1 G/DL — LOW (ref 3.3–5)
ALP SERPL-CCNC: 75 U/L — SIGNIFICANT CHANGE UP (ref 40–120)
ALT FLD-CCNC: 6 U/L — SIGNIFICANT CHANGE UP (ref 4–41)
ANION GAP SERPL CALC-SCNC: 6 MMOL/L — LOW (ref 7–14)
AST SERPL-CCNC: 10 U/L — SIGNIFICANT CHANGE UP (ref 4–40)
BASOPHILS # BLD AUTO: 0.05 K/UL — SIGNIFICANT CHANGE UP (ref 0–0.2)
BASOPHILS NFR BLD AUTO: 0.9 % — SIGNIFICANT CHANGE UP (ref 0–2)
BILIRUB SERPL-MCNC: <0.2 MG/DL — SIGNIFICANT CHANGE UP (ref 0.2–1.2)
BUN SERPL-MCNC: 25 MG/DL — HIGH (ref 7–23)
CALCIUM SERPL-MCNC: 8.3 MG/DL — LOW (ref 8.4–10.5)
CHLORIDE SERPL-SCNC: 109 MMOL/L — HIGH (ref 98–107)
CO2 SERPL-SCNC: 29 MMOL/L — SIGNIFICANT CHANGE UP (ref 22–31)
CREAT SERPL-MCNC: 3.09 MG/DL — HIGH (ref 0.5–1.3)
EOSINOPHIL # BLD AUTO: 0.18 K/UL — SIGNIFICANT CHANGE UP (ref 0–0.5)
EOSINOPHIL NFR BLD AUTO: 3.1 % — SIGNIFICANT CHANGE UP (ref 0–6)
GLUCOSE SERPL-MCNC: 90 MG/DL — SIGNIFICANT CHANGE UP (ref 70–99)
HCT VFR BLD CALC: 26.2 % — LOW (ref 39–50)
HGB BLD-MCNC: 7.5 G/DL — LOW (ref 13–17)
IANC: 3.58 K/UL — SIGNIFICANT CHANGE UP (ref 1.5–8.5)
IMM GRANULOCYTES NFR BLD AUTO: 0.3 % — SIGNIFICANT CHANGE UP (ref 0–1.5)
LYMPHOCYTES # BLD AUTO: 1.52 K/UL — SIGNIFICANT CHANGE UP (ref 1–3.3)
LYMPHOCYTES # BLD AUTO: 26.2 % — SIGNIFICANT CHANGE UP (ref 13–44)
MAGNESIUM SERPL-MCNC: 2.5 MG/DL — SIGNIFICANT CHANGE UP (ref 1.6–2.6)
MCHC RBC-ENTMCNC: 24.4 PG — LOW (ref 27–34)
MCHC RBC-ENTMCNC: 28.6 GM/DL — LOW (ref 32–36)
MCV RBC AUTO: 85.1 FL — SIGNIFICANT CHANGE UP (ref 80–100)
MONOCYTES # BLD AUTO: 0.46 K/UL — SIGNIFICANT CHANGE UP (ref 0–0.9)
MONOCYTES NFR BLD AUTO: 7.9 % — SIGNIFICANT CHANGE UP (ref 2–14)
NEUTROPHILS # BLD AUTO: 3.58 K/UL — SIGNIFICANT CHANGE UP (ref 1.8–7.4)
NEUTROPHILS NFR BLD AUTO: 61.6 % — SIGNIFICANT CHANGE UP (ref 43–77)
NRBC # BLD: 0 /100 WBCS — SIGNIFICANT CHANGE UP
NRBC # FLD: 0 K/UL — SIGNIFICANT CHANGE UP
PHOSPHATE SERPL-MCNC: 3.2 MG/DL — SIGNIFICANT CHANGE UP (ref 2.5–4.5)
PLATELET # BLD AUTO: 275 K/UL — SIGNIFICANT CHANGE UP (ref 150–400)
POTASSIUM SERPL-MCNC: 4 MMOL/L — SIGNIFICANT CHANGE UP (ref 3.5–5.3)
POTASSIUM SERPL-SCNC: 4 MMOL/L — SIGNIFICANT CHANGE UP (ref 3.5–5.3)
PROT SERPL-MCNC: 5.7 G/DL — LOW (ref 6–8.3)
RBC # BLD: 3.08 M/UL — LOW (ref 4.2–5.8)
RBC # FLD: 22.6 % — HIGH (ref 10.3–14.5)
SODIUM SERPL-SCNC: 144 MMOL/L — SIGNIFICANT CHANGE UP (ref 135–145)
WBC # BLD: 5.81 K/UL — SIGNIFICANT CHANGE UP (ref 3.8–10.5)
WBC # FLD AUTO: 5.81 K/UL — SIGNIFICANT CHANGE UP (ref 3.8–10.5)

## 2022-01-31 RX ADMIN — Medication 2 CAPSULE(S): at 10:09

## 2022-01-31 RX ADMIN — Medication 1 GRAM(S): at 23:34

## 2022-01-31 RX ADMIN — Medication 1 GRAM(S): at 13:21

## 2022-01-31 RX ADMIN — Medication 100 MILLIGRAM(S): at 13:24

## 2022-01-31 RX ADMIN — OXYCODONE AND ACETAMINOPHEN 2 TABLET(S): 5; 325 TABLET ORAL at 04:25

## 2022-01-31 RX ADMIN — PANTOPRAZOLE SODIUM 40 MILLIGRAM(S): 20 TABLET, DELAYED RELEASE ORAL at 18:05

## 2022-01-31 RX ADMIN — SENNA PLUS 2 TABLET(S): 8.6 TABLET ORAL at 22:04

## 2022-01-31 RX ADMIN — SODIUM CHLORIDE 75 MILLILITER(S): 9 INJECTION, SOLUTION INTRAVENOUS at 01:56

## 2022-01-31 RX ADMIN — HEPARIN SODIUM 5000 UNIT(S): 5000 INJECTION INTRAVENOUS; SUBCUTANEOUS at 18:05

## 2022-01-31 RX ADMIN — Medication 667 MILLIGRAM(S): at 13:24

## 2022-01-31 RX ADMIN — Medication 2 CAPSULE(S): at 13:23

## 2022-01-31 RX ADMIN — Medication 667 MILLIGRAM(S): at 16:59

## 2022-01-31 RX ADMIN — Medication 1 TABLET(S): at 13:23

## 2022-01-31 RX ADMIN — PANTOPRAZOLE SODIUM 40 MILLIGRAM(S): 20 TABLET, DELAYED RELEASE ORAL at 05:18

## 2022-01-31 RX ADMIN — Medication 1 GRAM(S): at 05:18

## 2022-01-31 RX ADMIN — OXYCODONE AND ACETAMINOPHEN 2 TABLET(S): 5; 325 TABLET ORAL at 10:51

## 2022-01-31 RX ADMIN — HEPARIN SODIUM 5000 UNIT(S): 5000 INJECTION INTRAVENOUS; SUBCUTANEOUS at 05:18

## 2022-01-31 RX ADMIN — GABAPENTIN 100 MILLIGRAM(S): 400 CAPSULE ORAL at 05:18

## 2022-01-31 RX ADMIN — OXYCODONE AND ACETAMINOPHEN 2 TABLET(S): 5; 325 TABLET ORAL at 11:51

## 2022-01-31 RX ADMIN — SODIUM CHLORIDE 75 MILLILITER(S): 9 INJECTION, SOLUTION INTRAVENOUS at 10:09

## 2022-01-31 RX ADMIN — Medication 650 MILLIGRAM(S): at 05:19

## 2022-01-31 RX ADMIN — FAMOTIDINE 20 MILLIGRAM(S): 10 INJECTION INTRAVENOUS at 22:04

## 2022-01-31 RX ADMIN — Medication 650 MILLIGRAM(S): at 23:34

## 2022-01-31 RX ADMIN — OXYCODONE AND ACETAMINOPHEN 2 TABLET(S): 5; 325 TABLET ORAL at 17:33

## 2022-01-31 RX ADMIN — TAMSULOSIN HYDROCHLORIDE 0.4 MILLIGRAM(S): 0.4 CAPSULE ORAL at 22:04

## 2022-01-31 RX ADMIN — Medication 667 MILLIGRAM(S): at 10:09

## 2022-01-31 RX ADMIN — GABAPENTIN 100 MILLIGRAM(S): 400 CAPSULE ORAL at 18:06

## 2022-01-31 RX ADMIN — Medication 1 MILLIGRAM(S): at 13:25

## 2022-01-31 RX ADMIN — Medication 2 CAPSULE(S): at 16:35

## 2022-01-31 RX ADMIN — Medication 650 MILLIGRAM(S): at 13:21

## 2022-01-31 RX ADMIN — Medication 650 MILLIGRAM(S): at 18:06

## 2022-01-31 RX ADMIN — PREGABALIN 1000 MICROGRAM(S): 225 CAPSULE ORAL at 13:25

## 2022-01-31 RX ADMIN — OXYCODONE AND ACETAMINOPHEN 2 TABLET(S): 5; 325 TABLET ORAL at 22:34

## 2022-01-31 RX ADMIN — OXYCODONE AND ACETAMINOPHEN 2 TABLET(S): 5; 325 TABLET ORAL at 16:33

## 2022-01-31 RX ADMIN — Medication 1 GRAM(S): at 18:05

## 2022-01-31 NOTE — DISCHARGE NOTE PROVIDER - NSDCCPCAREPLAN_GEN_ALL_CORE_FT
PRINCIPAL DISCHARGE DIAGNOSIS  Diagnosis: Hiatal hernia with GERD and esophagitis  Assessment and Plan of Treatment: You were found to have a portion of your stomach located higher than it should. This let acid from your stomach cause damage the lining of your esophagu, which causes pain.  You were treated with medication to lower acidity and heal your esophagus.      SECONDARY DISCHARGE DIAGNOSES  Diagnosis: Gastritis  Assessment and Plan of Treatment: You had damage to the lining of your stomach.     PRINCIPAL DISCHARGE DIAGNOSIS  Diagnosis: Hiatal hernia with GERD and esophagitis  Assessment and Plan of Treatment: You were found to have a portion of your stomach located higher than it should. This let acid from your stomach cause damage the lining of your esophagu, which causes pain.  You were treated with medication to lower acidity and heal your esophagus. You are to follow up with surgeon, Dr. Rios to repair your hiatial hernia and you are to follow up with Gastroenterology.      SECONDARY DISCHARGE DIAGNOSES  Diagnosis: Gastritis  Assessment and Plan of Treatment: You had damage to the lining of your stomach. You are to continue with medications as prescribed, follow up with gastroenterology and avoid alcohol.    Diagnosis: ETOH abuse  Assessment and Plan of Treatment: Alcohol cessation encouraged.    Diagnosis: Chronic kidney disease, unspecified CKD stage  Assessment and Plan of Treatment: Continue blood pressure, cholesterol and diabetic medications. Goal of hemoglobin A1C (HgbA1C) < 7%.  Avoid nephrotoxic drugs such as nonsteroidal anti-inflammatory agents (NSAIDs).   Please follow up with your nephrologist to monitor your kidney function, continue with low protein and potassium diet.       PRINCIPAL DISCHARGE DIAGNOSIS  Diagnosis: Hiatal hernia with GERD and esophagitis  Assessment and Plan of Treatment: You were found to have a portion of your stomach located higher than it should. This let acid from your stomach cause damage the lining of your esophagu, which causes pain.  You were treated with medication to lower acidity and heal your esophagus. You are to follow up with surgeon, Dr. Swanson to repair your hiatial hernia and you are to follow up with Gastroenterology.      SECONDARY DISCHARGE DIAGNOSES  Diagnosis: Gastritis  Assessment and Plan of Treatment: You had damage to the lining of your stomach. You are to continue with medications as prescribed, follow up with gastroenterology and avoid alcohol.    Diagnosis: ETOH abuse  Assessment and Plan of Treatment: Alcohol cessation encouraged.    Diagnosis: Chronic kidney disease, unspecified CKD stage  Assessment and Plan of Treatment: Continue blood pressure, cholesterol and diabetic medications. Goal of hemoglobin A1C (HgbA1C) < 7%.  Avoid nephrotoxic drugs such as nonsteroidal anti-inflammatory agents (NSAIDs).   Please follow up with your nephrologist to monitor your kidney function, continue with low protein and potassium diet.

## 2022-01-31 NOTE — DIETITIAN NUTRITION RISK NOTIFICATION - PHYSICAL ASSESSMENT TEMPLES
Spoke with patient's  regarding the plan for surgery and her missed preop appointment earlier today. I explained that she is currently scheduled for surgery for a left modified radical mastectomy on 9/26/18, however no reconstruction is scheduled, but Dr. Khanna's note says that he recommends placing a tissue expander despite the known need for post-mastectomy radiation. Considering this, I would like to add Dr. Khanna to follow to place the tissue expander. Jose is in agreement. We discussed that the right prophylactic mastectomy would be performed in a delayed fashion at the time of implant exchange after the completion of radiation therapy so as not to delay or complicate her cancer treatment. Patient and her  are in agreement, and her preop appointment will be rescheduled.    mild

## 2022-01-31 NOTE — DIETITIAN INITIAL EVALUATION ADULT. - PROBLEM SELECTOR PLAN 1
Assessment:  - patient presented to the hospital for burning abdominal pain and n/v, multiple similar complaints in the past  - CT abd/pelvis done yesterday from [place] showed hiatal hernia and reflux esophagitis, no bowel obstruction  - previous work up: High-grade obstruction, distal esophagus on esophagram, CT scan: There is a moderate hiatal hernia. There is moderate concentric low-attenuation wall thickening of the esophagus with prominent mucosal enhancement, suggestive of use esophagitis. EGD: reported no mass present, esophagitis, gastritis on note. No EGD report in the system  - patient likely to have worsening GERD and esophagitis causing his symptoms  - low suspicion for ACS: troponin neg x1, pending EKG    Plan:  - protonix 40mg BID  - GI cocktail PRN  - GI consult for evaluation of esophagitis and hiatal hernia, EGD report and if there is further work up indicated  - zofran prn for n/v  - f/u EKG

## 2022-01-31 NOTE — CONSULT NOTE ADULT - ASSESSMENT
This is a 61 M with pmhx of ETOH abuse, chronic pancreatitis, Hep C, gout, gastric perf in 2019 presented to the ED at [place] for abdominal pain and n/v. The patient was transferred to [facility] for "offloading" due to pandemic. Had full GI work up, EGD (as per previous notes) showed hiatal hernia and esophageal thickening, suspected esophagitis.  Nephrology consulted for renal failure.    A/P    CKD 4  baseline ~3.5   scr improving   avoid further nephrotoxins, NSAIDS,RCA   Hepatitis C positive   monitor BMP, u/O    HTN  elevated  not on bp meds  monitor     CKD;MBD  on Phoslo  PO4 optimal   monitor Ca, PO4      This is a 61 M with pmhx of ETOH abuse, chronic pancreatitis, Hep C, gout, gastric perf in 2019 presented to the ED at [place] for abdominal pain and n/v. The patient was transferred to [facility] for "offloading" due to pandemic. Had full GI work up, EGD (as per previous notes) showed hiatal hernia and esophageal thickening, suspected esophagitis.  Nephrology consulted for renal failure.    A/P    CKD 4  baseline ~3.5   scr improving   avoid further nephrotoxins, NSAIDS,RCA   Hepatitis C positive   monitor BMP, u/O    HTN  fluctuating  not on bp meds  monitor     CKD;MBD  on Phoslo  PO4 optimal   monitor Ca, PO4

## 2022-01-31 NOTE — DISCHARGE NOTE PROVIDER - NSDCFUADDAPPT_GEN_ALL_CORE_FT
Follow up with your primary care physician for further monitoring in 1-2 weeks. Please call to arrange appointment.   Follow up with your primary care physician for further monitoring in 1-2 weeks. Please call to arrange appointment.  Follow up with gastroenterology within 1-2 weeks of discharge.  Follow up with surgery, Dr. Swanson within 1-2 weeks of discharge.     Follow up with your primary care physician for further monitoring in 1-2 weeks. You have an appointment at [facility] on 2/16/2022 at 1:20 PM.   Follow up with gastroenterology within 1-2 weeks of discharge.  Follow up with surgery, Dr. Swanson within 1-2 weeks of discharge.

## 2022-01-31 NOTE — DISCHARGE NOTE PROVIDER - CARE PROVIDERS DIRECT ADDRESSES
,DirectAddress_Unknown ,[email or web address]

## 2022-01-31 NOTE — DIETITIAN INITIAL EVALUATION ADULT. - PROBLEM SELECTOR PLAN 3
Assessment:  - hx of pancreatitis, unclear etiology however could be from chronic ETOH abuse in the past  - lipase 204  - CT from [place] shows no fat stranding in the pancreas    Plan:  - LR 75cc/hr fore rehydration  - monitor for now

## 2022-01-31 NOTE — DISCHARGE NOTE PROVIDER - HOSPITAL COURSE
61 M with pmhx of ETOH abuse, chronic pancreatitis, Hep C, gout, gastric perf in 2019 presented to the ED at [place] for abdominal pain and n/v. Patient known to have similar complaints previously. Found to have esophagitis and gastritis. Admitted for esophagitis management.      Hiatal hernia with GERD and esophagitis / gastritis  - GI consulted. Performed EGD which found grade D (one or more mucosal breaks involving at least 75% of esophageal circumference) esophagitis with no bleeding and islands of Barretts's esophagus. Biopsies were taken from esophagus and stomach, showing ____.Treated with PPI BID, Carafate QID and Pepcid QHS.     Chronic pancreatitis.   - hx of pancreatitis, unclear etiology however could be from chronic ETOH abuse in the past  - CT from [place] showed no fat stranding in the pancreas.     ETOH abuse.   - patient denies ETOH use recently but hx of abuse in the past  - no ETOH level done, physical exam shows no signs of withdrawal.    Hepatitis C virus infection.   - trended LFTs  - hx of treatment in the past.     Chronic kidney disease, unspecified CKD stage.   -monitored Cr, found to be at his baseline when compared to his previous labs.           61 M with pmhx of ETOH abuse, chronic pancreatitis, Hep C, gout, gastric perf in 2019 presented to the ED at [place] for abdominal pain and n/v. Patient known to have similar complaints previously. Found to have esophagitis and gastritis. Admitted for esophagitis management.      Hiatal hernia with GERD and esophagitis / gastritis  - GI consulted. Performed EGD which found grade D (one or more mucosal breaks involving at least 75% of esophageal circumference) esophagitis with no bleeding and islands of Barretts's esophagus.  -  Biopsies were taken from esophagus and stomach, to be followed up as outpatient   -  Treated with PPI BID, Carafate QID and Pepcid QHS.   - Pt to follow up with PCP and GI as outpatient   - pt to follow up with surgeon as outpatient     Chronic pancreatitis.   - hx of pancreatitis, unclear etiology however could be from chronic ETOH abuse in the past  - CT from [place] showed no fat stranding in the pancreas.     ETOH abuse.   - patient denies ETOH use recently but hx of abuse in the past  - no ETOH level done, physical exam shows no signs of withdrawal.    Hepatitis C virus infection.   - trended LFTs  - hx of treatment in the past.     Chronic kidney disease, unspecified CKD stage.   -monitored Cr, found to be at his baseline when compared to his previous labs.    On 2/1/2022, discussed with Dr. Kebede, patient is medically cleared and optimized for discharge today. All medications were reviewed with attending, and sent to mutually agreed upon pharmacy.

## 2022-01-31 NOTE — CONSULT NOTE ADULT - SUBJECTIVE AND OBJECTIVE BOX
Chief Complaint:  Patient is a 61y old  Male who presents with a chief complaint of abdominal pain (26 Jan 2022 21:40)    ETOH abuse    Pancreatitis    Hepatitis C    Gout    No significant past surgical history    Gastric perforation       HPI:  This is a 61 M with pmhx of ETOH abuse, chronic pancreatitis, Hep C, gout, gastric perf in 2019 presented to the ED at [facility] for abdominal pain and n/v. The patient was transferred to [facility] for "offloading" due to pandemic. Overall patient is a poor historian. He presented to the ED at [facility] for severe abdominal pain and n/v. The patient appears to have multiple visits for similar complaint, previously admitted in december. Had full GI work up, EGD (as per previous notes) showed hiatal hernia and esophageal thickening, suspected esophagitis. He now presents again for abdominal pain and n/v. States pain is in the epigastric area and radiates to the back, states it is severe burning. He also states eating food makes it feel better. He does not remember how many times he vomited. Denies blood in vomit. Unable to obtain medhx in regards to NSAID use or blood thinner use. GI consulted for n/v and epigastric pressure/pain. The patient underwent recent EGD with Dr. Tucker conroy/hiatal hernia and esophageal thickening. He then had an esophagram with concern for high grade obstruction, distal esophagus and a follow up CT after demonstrating Focal fullness involving the lower esophagus and the proximal   stomach may be due to a mass given the constellation of findings although evaluation is limited due to lack of intravenous contrast and under distention of the lower esophagus and stomach. Pt now with recurrent substernal chest pressure and inability to keep all food down, has nausea and vomiting and hiccups. He reports being able to tolerate saltines but not clear liquids today.         No Known Allergies      acetaminophen     Tablet .. 650 milliGRAM(s) Oral every 6 hours PRN  aluminum hydroxide/magnesium hydroxide/simethicone Suspension 30 milliLiter(s) Oral every 4 hours PRN  calcium acetate 667 milliGRAM(s) Oral three times a day with meals  cyanocobalamin 1000 MICROGram(s) Oral daily  folic acid 1 milliGRAM(s) Oral daily  gabapentin 100 milliGRAM(s) Oral every 12 hours  heparin   Injectable 5000 Unit(s) SubCutaneous every 12 hours  lactated ringers. 1000 milliLiter(s) IV Continuous <Continuous>  melatonin 3 milliGRAM(s) Oral at bedtime PRN  multivitamin 1 Tablet(s) Oral daily  ondansetron Injectable 4 milliGRAM(s) IV Push every 8 hours PRN  oxycodone    5 mG/acetaminophen 325 mG 1 Tablet(s) Oral every 6 hours PRN  pancrelipase  (CREON  6,000 Lipase Units) 2 Capsule(s) Oral three times a day with meals  pantoprazole  Injectable 40 milliGRAM(s) IV Push two times a day  senna 2 Tablet(s) Oral at bedtime  sodium bicarbonate 650 milliGRAM(s) Oral every 6 hours  sucralfate 1 Gram(s) Oral four times a day  tamsulosin 0.4 milliGRAM(s) Oral at bedtime  thiamine 100 milliGRAM(s) Oral daily        FAMILY HISTORY:  FH: HTN (hypertension)          Review of Systems:    General:  No wt loss, fevers, chills, night sweats, fatigue  Eyes:  Good vision, no reported pain  ENT:  No sore throat, pain, runny nose, dysphagia  CV:  No pain, palpitations, no lightheadedness  Resp:  No dyspnea, cough, tachypnea, wheezing  GI: .  :  No pain, bleeding, incontinence, nocturia  Muscle:  No pain, weakness  Neuro:  No weakness, tingling, memory problems  Psych:  No fatigue, insomnia, mood problems, depression  Endocrine:  No polyuria, polydypsia, cold/heat intolerance  Heme:  No petechiae, ecchymosis, easy bruisability  Skin:  No rash, tattoos, scars, edema    Relevant Family History:   n/c    Relevant Social History: n/c      Physical Exam:    Vital Signs:  Vital Signs Last 24 Hrs  T(C): 37.1 (27 Jan 2022 09:32), Max: 37.1 (27 Jan 2022 09:32)  T(F): 98.8 (27 Jan 2022 09:32), Max: 98.8 (27 Jan 2022 09:32)  HR: 90 (27 Jan 2022 09:32) (68 - 90)  BP: 133/76 (27 Jan 2022 09:32) (121/57 - 133/76)  BP(mean): --  RR: 18 (27 Jan 2022 09:32) (18 - 20)  SpO2: 100% (27 Jan 2022 09:32) (99% - 100%)  Daily     Daily     General:  Appears stated age, well-groomed, nad  HEENT:  NC/AT,  conjunctivae clear and pink, no thyromegaly, nodules, adenopathy, no JVD  Chest:  Full & symmetric excursion, no increased effort, breath sounds clear  Cardiovascular:  Regular rhythm, S1, S2, no murmur/rub/S3/S4, no abdominal bruit, no edema  Abdomen:  Soft, non-tender, non-distended, normoactive bowel sounds,  no masses ,no hepatosplenomeagaly, no signs of chronic liver disease  Extremities:  no cyanosis,clubbing or edema  Skin:  No rash/erythema/ecchymoses/petechiae/wounds/abscess/warm/dry  Neuro/Psych:  A&O x3 , no asterixis, no tremor, no encephalopathy    Laboratory:                            8.5    10.26 )-----------( 365      ( 26 Jan 2022 08:33 )             30.0     01-26    139  |  104  |  46<H>  ----------------------------<  108<H>  4.2   |  32<H>  |  3.77<H>    Ca    7.9<L>      26 Jan 2022 08:33  Phos  3.8     01-26  Mg     4.6     01-26    TPro  6.4  /  Alb  2.7<L>  /  TBili  0.2  /  DBili  x   /  AST  12<L>  /  ALT  16  /  AlkPhos  95  01-26    LIVER FUNCTIONS - ( 26 Jan 2022 08:33 )  Alb: 2.7 g/dL / Pro: 6.4 gm/dL / ALK PHOS: 95 U/L / ALT: 16 U/L / AST: 12 U/L / GGT: x                 Imaging:    < from: Xray Esophagram Single Contrast (12.27.21 @ 11:13) >    ACC: [id number] EXAM:  XR ESOPH SNGL CON STUDY                          PROCEDURE DATE:  12/27/2021          INTERPRETATION:  Esophagram    HISTORY: Reflux esophagitis    A single contrast esophagram was performed.    Interpretation: The proximal andmid esophagus is normal in course and   caliber. There is a high-grade obstruction at the distal esophagus which,   after five minute delay, allowed a small amount of contrast to pass into   the upper stomach.    IMPRESSION: High-grade obstruction, distal esophagus.    The above finding was conveyed by telephone to ROB Hodges at 2:55 PM on the   day of the study.    --- End of Report ---  MEAGAN JIMENEZ MD; Attending Interventional Radiologist  This document has been electronically signed.Dec 27 2021  2:56PM    < end of copied text >    < from: CT Chest w/ Oral Cont (12.28.21 @ 12:19) >    ACC: [id number] EXAM:  CT CHEST OC                          PROCEDURE DATE:  12/28/2021          INTERPRETATION:  CLINICAL INDICATION: Dysphagia, esophageal obstruction.    Axial CT images of the chest are obtained without intravenous   administration of contrast. Oral contrast was administered.    Mucosal thickening involving the partially imaged maxillary sinuses.    No enlarged axillary, mediastinal or hilar lymph nodes.    Heart size is normal. No pericardial effusion. Trace left pleural fluid.    Oral contrast within the esophagus to the level of the lower   esophagus/gastroesophageal junction where there is focal fullness   extending into the proximal stomach. Please note evaluation is limited   due to under distention of the stomach and the lower esophagus and lack   of intravenous contrast. Previously described wall thickening of the mid   to lower esophagus is again noted.    Evaluation of the upper abdomen demonstrate extensive pancreatic   calcifications suggestive of chronic pancreatitis. The pancreatic duct is   enlarged measuring up to about 1 cm although again evaluation is limited   due to lack of intravenous contrast. Oral contrast within loops of colon   from the prior esophagram.    Evaluation of the lungs demonstrate emphysema. Respiratory motion   artifact limits evaluation of the mid to lower lungs. Mild bilateral   lower lung areas of linear or subsegmental atelectasis. A few ill-defined   small right lung peripheral patchy groundglass nonspecific opacities   within all 3 lobes are new since the chest CT of December 20, 2021. Small   cluster of a few 2 or 3 mm tree-in-bud opacities within the right upper   and right lower lobes are suggestive of foci of impacted distal airways.   Trace secretion within the trachea extending into the right mainstem   bronchus.    No significant bony abnormality.    IMPRESSION: Focal fullness involving the lower esophagus and the proximal   stomach may be due to a mass given the constellation of findings although   evaluation is limited due to lack of intravenous contrast and under   distention of the lower esophagus and stomach. Contrast-enhanced CT can   be performed for further evaluation.    Mid to lower wall esophageal wall thickening.    Oral contrast within loops of colon from the prior esophagram.    A few nonspecific right lung peripheral patchy groundglass opacities new   since December 20, 2021 may be of infectious/inflammatory etiology.    Diffuse pancreatic calcifications suggestive of chronic pancreatitis.   Enlargement of the pancreatic duct is not well evaluated on this   noncontrast chest CT. Dedicated contrast enhanced pancreatic CT or MRI   can be performed for further evaluation.    --- End of Report ---            SUSHIL MONTES MD; Attending Radiologist  This document has been electronically signed. Dec 28 2021  3:07PM    < end of copied text >        
[facility]   MD MICHELLE FOSTER MD, PA INJUNG KO NP    TEL:  OFFICE: [phone]  DR ORTIZ CELL: [phone]  DR. MASON CELL: [phone]  SHOAIB HUNTER CELL: [phone]  MARIA ESTHER SNEED CELL :[phone]  From 5pm-7am answering service [phone]    --- INITIAL RENAL CONSULT NOTE ---date of service 01-31-22 @ 12:09    HPI:  This is a 61 M with pmhx of ETOH abuse, chronic pancreatitis, Hep C, gout, gastric perf in 2019 presented to the ED at [facility] for abdominal pain and n/v. The patient was transferred to [facility] for "offloading" due to pandemic. Overall patient is a poor historian. He presented to the ED at [facility] for severe abdominal pain and n/v. The patient appears to have multiple visits for similar complaint, previously admitted in december. Had full GI work up, EGD (as per previous notes) showed hiatal hernia and esophageal thickening, suspected esophagitis. He now presents again for abdominal pain and n/v. States pain is in the epigastric area and radiates to the back, states it is severe burning. He also states eating food makes it feel better. He does not remember how many times he vomited. Denies blood in vomit. Unable to obtain medhx in regards to NSAID use or blood thinner use. Nephrology consulted for renal failure.        Allergies:  No Known Allergies      PAST MEDICAL & SURGICAL HISTORY:  ETOH abuse    Pancreatitis    Hepatitis C  treated    Gout    Gastric perforation        Home Medications Reviewed    Hospital Medications:   MEDICATIONS  (STANDING):  calcium acetate 667 milliGRAM(s) Oral three times a day with meals  cyanocobalamin 1000 MICROGram(s) Oral daily  famotidine    Tablet 20 milliGRAM(s) Oral at bedtime  folic acid 1 milliGRAM(s) Oral daily  gabapentin 100 milliGRAM(s) Oral every 12 hours  heparin   Injectable 5000 Unit(s) SubCutaneous every 12 hours  influenza   Vaccine 0.5 milliLiter(s) IntraMuscular once  lactated ringers. 1000 milliLiter(s) (75 mL/Hr) IV Continuous <Continuous>  multivitamin 1 Tablet(s) Oral daily  pancrelipase  (CREON  6,000 Lipase Units) 2 Capsule(s) Oral three times a day with meals  pantoprazole  Injectable 40 milliGRAM(s) IV Push two times a day  senna 2 Tablet(s) Oral at bedtime  sodium bicarbonate 650 milliGRAM(s) Oral every 6 hours  sucralfate suspension 1 Gram(s) Oral four times a day  tamsulosin 0.4 milliGRAM(s) Oral at bedtime  thiamine 100 milliGRAM(s) Oral daily      SOCIAL HISTORY:  Denies ETOh, Smoking,     FAMILY HISTORY:  FH: HTN (hypertension)        REVIEW OF SYSTEMS:  CONSTITUTIONAL: No weakness, fevers or chills  EYES/ENT: No visual changes;  No vertigo or throat pain   NECK: No pain or stiffness  RESPIRATORY: No cough, wheezing, hemoptysis; No shortness of breath  CARDIOVASCULAR: No chest pain or palpitations.  GASTROINTESTINAL:  abdominal or epigastric pain. No nausea, vomiting, or hematemesis; No diarrhea or constipation. No melena or hematochezia.  GENITOURINARY: No dysuria, frequency, foamy urine, urinary urgency, incontinence or hematuria  NEUROLOGICAL: No numbness or weakness  SKIN: No itching, burning, rashes, or lesions   VASCULAR: No bilateral lower extremity edema.   All other review of systems is negative unless indicated above.    VITALS:  T(F): 98.2 (01-31-22 @ 05:19), Max: 98.6 (01-30-22 @ 21:44)  HR: 75 (01-31-22 @ 05:19)  BP: 140/73 (01-31-22 @ 05:19)  RR: 17 (01-31-22 @ 05:19)  SpO2: 100% (01-31-22 @ 05:19)  Wt(kg): --        PHYSICAL EXAM:  Constitutional: NAD  HEENT: anicteric sclera, oropharynx clear, MMM  Neck: No JVD  Respiratory: CTAB, no wheezes, rales or rhonchi  Cardiovascular: S1, S2, RRR  Gastrointestinal: BS+, soft, NT/ND  Extremities: No cyanosis or clubbing. No peripheral edema  Neurological: A/O x 3, no focal deficits  Psychiatric: Normal mood, normal affect  : No CVA tenderness. No chen.   Skin: No rashes    LABS:  01-31    144  |  109<H>  |  25<H>  ----------------------------<  90  4.0   |  29  |  3.09<H>    Ca    8.3<L>      31 Jan 2022 07:50  Phos  3.2     01-31  Mg     2.50     01-31    TPro  5.7<L>  /  Alb  3.1<L>  /  TBili  <0.2  /  DBili      /  AST  10  /  ALT  6   /  AlkPhos  75  01-31    Creatinine Trend: 3.09 <--, 3.19 <--, 3.23 <--, 3.57 <--, 3.65 <--, 3.77 <--, 4.22 <--                        7.5    5.81  )-----------( 275      ( 31 Jan 2022 07:50 )             26.2     Urine Studies:        RADIOLOGY & ADDITIONAL STUDIES:

## 2022-01-31 NOTE — DISCHARGE NOTE PROVIDER - NSFOLLOWUPCLINICS_GEN_ALL_ED_FT
[facility]  Internal Medicine  [address] [place] [address]  Phone: [phone]  Fax: [phone]

## 2022-01-31 NOTE — DIETITIAN INITIAL EVALUATION ADULT. - ORAL INTAKE PTA/DIET HISTORY
Patient seen for assessment. Reports decreased appetite/PO intake for months r/t chronic pain and reported being homeless. Patient states he just grabs whatever he can on the go, social work made aware.

## 2022-01-31 NOTE — DIETITIAN INITIAL EVALUATION ADULT. - OTHER INFO
61 year old male with a PMH of ETOH abuse, chronic pancreatitis, Hep C, gout presented to the ED at [facility] for abdominal pain and n/v, found to have esophagitis and gastritis, admitted for esophagitis management per chart.    Patient reports fair appetite/PO intake in house r/t continued pain and food preferences. Food preferences reviewed. No GI distress or chewing/swallowing difficulties reported. Has no food allergies. Reports UBW is 218 lbs., but couldn't provide time frame when he was last that weight. No weight documented per chart. Confirms height is 73 in. No edema or pressure injuries noted per RN flow sheet.    Discussed with patient while in house to consider small frequent meals, focus on protein rich foods with snacks in between which will be provided. Discussed foods to monitor in relation to GERD/esophagitis. Patient amenable to consume ensure enlive (350 kcal, 20 pro) to help meet nutritional needs.

## 2022-01-31 NOTE — DISCHARGE NOTE PROVIDER - DETAILS OF MALNUTRITION DIAGNOSIS/DIAGNOSES
This patient has been assessed with a concern for Malnutrition and was treated during this hospitalization for the following Nutrition diagnosis/diagnoses:     -  01/31/2022: Severe protein-calorie malnutrition

## 2022-01-31 NOTE — DIETITIAN INITIAL EVALUATION ADULT. - ADD RECOMMEND
Consider adjusting diet to regular/no concentrated potassium. Continue with vitamin B12, multivitamin, thiamine and folic acid supplementation. Obtain weekly weight and document PO intake to monitor trend. 0

## 2022-01-31 NOTE — DISCHARGE NOTE PROVIDER - NSDCMRMEDTOKEN_GEN_ALL_CORE_FT
amLODIPine 10 mg oral tablet: 1 tab(s) orally once a day  calcium acetate 667 mg oral tablet: 1 tab(s) orally 3 times a day  cyanocobalamin 1000 mcg oral tablet: 1 tab(s) orally once a day  folic acid 1 mg oral tablet: 1 tab(s) orally once a day  gabapentin 100 mg oral capsule: 1 cap(s) orally every 12 hours MDD:2 caps a day  Multiple Vitamins oral tablet: 1 tab(s) orally once a day  ondansetron 4 mg oral disintegrating strip: 1 each orally 3 times a day, As Needed -for nausea   oxycodone-acetaminophen 5 mg-325 mg oral tablet: 2 tab(s) orally every 6 hours, As Needed -Severe Pain (7 - 10) MDD:4 tabs a day.  pancrelipase 6000 units-19,000 units-30,000 units oral delayed release capsule: 2 cap(s) orally 3 times a day (with meals)  pantoprazole 40 mg oral delayed release tablet: 1 tab(s) orally 2 times a day  physical therapy: Physical therapy 3-5x/week for 5weeks. Dx: unsteady gait  senna oral tablet: 2 tab(s) orally once a day (at bedtime)  sodium bicarbonate 650 mg oral tablet: 1 tab(s) orally every 6 hours  sucralfate 1 g oral tablet: 1 tab(s) orally 4 times a day  tamsulosin 0.4 mg oral capsule: 1 cap(s) orally once a day (at bedtime)  thiamine 100 mg oral tablet: 1 tab(s) orally once a day   amLODIPine 10 mg oral tablet: 1 tab(s) orally once a day  calcium acetate 667 mg oral tablet: 1 tab(s) orally 3 times a day  cyanocobalamin 1000 mcg oral tablet: 1 tab(s) orally once a day  folic acid 1 mg oral tablet: 1 tab(s) orally once a day  gabapentin 100 mg oral capsule: 1 cap(s) orally every 12 hours MDD:2 caps a day  Multiple Vitamins oral tablet: 1 tab(s) orally once a day  ondansetron 4 mg oral disintegrating strip: 1 each orally 3 times a day, As Needed -for nausea   oxycodone-acetaminophen 5 mg-325 mg oral tablet: 2 tab(s) orally every 6 hours, As Needed -Severe Pain (7 - 10) MDD:4 tabs a day.  pancrelipase 6000 units-19,000 units-30,000 units oral delayed release capsule: 2 cap(s) orally 3 times a day (with meals)  pantoprazole 40 mg oral delayed release tablet: 1 tab(s) orally 2 times a day  senna oral tablet: 2 tab(s) orally once a day (at bedtime)  sodium bicarbonate 650 mg oral tablet: 1 tab(s) orally every 6 hours  sucralfate 1 g oral tablet: 1 tab(s) orally 4 times a day  tamsulosin 0.4 mg oral capsule: 1 cap(s) orally once a day (at bedtime)  thiamine 100 mg oral tablet: 1 tab(s) orally once a day   calcium acetate 667 mg oral tablet: 1 tab(s) orally 3 times a day  cyanocobalamin 1000 mcg oral tablet: 1 tab(s) orally once a day  famotidine 20 mg oral tablet: 1 tab(s) orally once a day (at bedtime)  folic acid 1 mg oral tablet: 1 tab(s) orally once a day  gabapentin 100 mg oral capsule: 1 cap(s) orally every 12 hours MDD:2 caps a day  Multiple Vitamins oral tablet: 1 tab(s) orally once a day  ondansetron 4 mg oral disintegrating strip: 1 each orally 3 times a day, As Needed -for nausea   oxycodone-acetaminophen 5 mg-325 mg oral tablet: 2 tab(s) orally every 6 hours, As Needed -Severe Pain (7 - 10) MDD:4 tabs a day.  pancrelipase 6000 units-19,000 units-30,000 units oral delayed release capsule: 2 cap(s) orally 3 times a day (with meals)  pantoprazole 40 mg oral delayed release tablet: 1 tab(s) orally 2 times a day  senna oral tablet: 2 tab(s) orally once a day (at bedtime)  sodium bicarbonate 650 mg oral tablet: 1 tab(s) orally every 6 hours  sucralfate 1 g/10 mL oral suspension: 10 milliliter(s) orally 4 times a day  tamsulosin 0.4 mg oral capsule: 1 cap(s) orally once a day (at bedtime)  thiamine 100 mg oral tablet: 1 tab(s) orally once a day   calcium acetate 667 mg oral tablet: 1 tab(s) orally 3 times a day  cyanocobalamin 1000 mcg oral tablet: 1 tab(s) orally once a day  famotidine 20 mg oral tablet: 1 tab(s) orally once a day (at bedtime)  folic acid 1 mg oral tablet: 1 tab(s) orally once a day  gabapentin 100 mg oral capsule: 1 cap(s) orally every 12 hours MDD:2 caps a day  Multiple Vitamins oral tablet: 1 tab(s) orally once a day  ondansetron 4 mg oral disintegrating strip: 1 each orally 3 times a day, As Needed -for nausea   oxycodone-acetaminophen 5 mg-325 mg oral tablet: 1 tab(s) orally every 6 hours, As needed, Moderate Pain (4 - 6) MDD:4  pancrelipase 6000 units-19,000 units-30,000 units oral delayed release capsule: 2 cap(s) orally 3 times a day (with meals)  pantoprazole 40 mg oral delayed release tablet: 1 tab(s) orally 2 times a day  senna oral tablet: 2 tab(s) orally once a day (at bedtime)  sodium bicarbonate 650 mg oral tablet: 1 tab(s) orally every 6 hours  sucralfate 1 g oral tablet: 1 tab(s) orally 4 times a day   tamsulosin 0.4 mg oral capsule: 1 cap(s) orally once a day (at bedtime)  thiamine 100 mg oral tablet: 1 tab(s) orally once a day   calcium acetate 667 mg oral tablet: 1 tab(s) orally 3 times a day  cyanocobalamin 1000 mcg oral tablet: 1 tab(s) orally once a day  famotidine 20 mg oral tablet: 1 tab(s) orally once a day (at bedtime)  folic acid 1 mg oral tablet: 1 tab(s) orally once a day  gabapentin 100 mg oral capsule: 1 cap(s) orally every 12 hours MDD:2 caps a day  Multiple Vitamins oral tablet: 1 tab(s) orally once a day  ondansetron 4 mg oral disintegrating strip: 1 each orally 3 times a day, As Needed -for nausea   pancrelipase 6000 units-19,000 units-30,000 units oral delayed release capsule: 2 cap(s) orally 3 times a day (with meals)  pantoprazole 40 mg oral delayed release tablet: 1 tab(s) orally 2 times a day  senna oral tablet: 2 tab(s) orally once a day (at bedtime)  sodium bicarbonate 650 mg oral tablet: 1 tab(s) orally every 6 hours  sucralfate 1 g oral tablet: 1 tab(s) orally 4 times a day   tamsulosin 0.4 mg oral capsule: 1 cap(s) orally once a day (at bedtime)  thiamine 100 mg oral tablet: 1 tab(s) orally once a day   calcium acetate 667 mg oral tablet: 1 tab(s) orally 3 times a day  cyanocobalamin 1000 mcg oral tablet: 1 tab(s) orally once a day  famotidine 20 mg oral tablet: 1 tab(s) orally once a day (at bedtime)  folic acid 1 mg oral tablet: 1 tab(s) orally once a day  gabapentin 100 mg oral capsule: 1 cap(s) orally every 12 hours MDD:2 caps a day  Multiple Vitamins oral tablet: 1 tab(s) orally once a day  ondansetron 4 mg oral disintegrating strip: 1 each orally 3 times a day, As Needed -for nausea   oxycodone-acetaminophen 5 mg-325 mg oral tablet: 2 tab(s) orally every 6 hours, As Needed -Moderate Pain (4 - 6) MDD:4   pancrelipase 6000 units-19,000 units-30,000 units oral delayed release capsule: 2 cap(s) orally 3 times a day (with meals)  pantoprazole 40 mg oral delayed release tablet: 1 tab(s) orally 2 times a day  senna oral tablet: 2 tab(s) orally once a day (at bedtime)  sodium bicarbonate 650 mg oral tablet: 1 tab(s) orally every 6 hours  sucralfate 1 g oral tablet: 1 tab(s) orally 4 times a day   tamsulosin 0.4 mg oral capsule: 1 cap(s) orally once a day (at bedtime)  thiamine 100 mg oral tablet: 1 tab(s) orally once a day

## 2022-01-31 NOTE — DISCHARGE NOTE PROVIDER - PROVIDER TOKENS
FREE:[LAST:[PCP],PHONE:[(   )    -],FAX:[(   )    -]] PROVIDER:[TOKEN:[[id number]]] PROVIDER:[TOKEN:[[id number]]],PROVIDER:[TOKEN:[[id number]]],PROVIDER:[TOKEN:[[id number]]]

## 2022-01-31 NOTE — DIETITIAN INITIAL EVALUATION ADULT. - PERTINENT MEDS FT
MEDICATIONS  (STANDING):  calcium acetate 667 milliGRAM(s) Oral three times a day with meals  cyanocobalamin 1000 MICROGram(s) Oral daily  famotidine    Tablet 20 milliGRAM(s) Oral at bedtime  folic acid 1 milliGRAM(s) Oral daily  gabapentin 100 milliGRAM(s) Oral every 12 hours  heparin   Injectable 5000 Unit(s) SubCutaneous every 12 hours  influenza   Vaccine 0.5 milliLiter(s) IntraMuscular once  lactated ringers. 1000 milliLiter(s) (75 mL/Hr) IV Continuous <Continuous>  multivitamin 1 Tablet(s) Oral daily  pancrelipase  (CREON  6,000 Lipase Units) 2 Capsule(s) Oral three times a day with meals  pantoprazole  Injectable 40 milliGRAM(s) IV Push two times a day  senna 2 Tablet(s) Oral at bedtime  sodium bicarbonate 650 milliGRAM(s) Oral every 6 hours  sucralfate suspension 1 Gram(s) Oral four times a day  tamsulosin 0.4 milliGRAM(s) Oral at bedtime  thiamine 100 milliGRAM(s) Oral daily

## 2022-01-31 NOTE — DISCHARGE NOTE PROVIDER - CARE PROVIDER_API CALL
PCP,   Phone: (   )    -  Fax: (   )    -  Follow Up Time:    Dariel Hardy)  Internal Medicine  [facility] [facility] [facility] [address], [address]st [place] [address]  Phone: [phone]  Fax: [phone]  Follow Up Time:    Dariel Hardy)  Internal Medicine  [address] [facility], [address]st [place] [address]  Phone: [phone]  Fax: [phone]  Follow Up Time:     Burak Swanson)  Surgery  [address] [facility] [address], [address]  [place] [address]  Phone: [phone]  Fax: [phone]  Follow Up Time:     [facility]  Gastroenterology  [address] [place] [address]  Phone: [phone]  Fax: [phone]  Follow Up Time:

## 2022-01-31 NOTE — PHYSICAL THERAPY INITIAL EVALUATION ADULT - PERTINENT HX OF CURRENT PROBLEM, REHAB EVAL
Patient is 61 year old admitted with history of  ETOH abuse, chronic pancreatitis, Hep C, gout, gastric perf in 2019 presented to the ED at [place] for abdominal pain and n/v

## 2022-02-01 ENCOUNTER — TRANSCRIPTION ENCOUNTER (OUTPATIENT)
Age: 62
End: 2022-02-01

## 2022-02-01 LAB
ANION GAP SERPL CALC-SCNC: 7 MMOL/L — SIGNIFICANT CHANGE UP (ref 7–14)
BUN SERPL-MCNC: 23 MG/DL — SIGNIFICANT CHANGE UP (ref 7–23)
CALCIUM SERPL-MCNC: 8.3 MG/DL — LOW (ref 8.4–10.5)
CHLORIDE SERPL-SCNC: 110 MMOL/L — HIGH (ref 98–107)
CO2 SERPL-SCNC: 27 MMOL/L — SIGNIFICANT CHANGE UP (ref 22–31)
CREAT SERPL-MCNC: 3.14 MG/DL — HIGH (ref 0.5–1.3)
GLUCOSE SERPL-MCNC: 91 MG/DL — SIGNIFICANT CHANGE UP (ref 70–99)
HCT VFR BLD CALC: 25.4 % — LOW (ref 39–50)
HGB BLD-MCNC: 7.3 G/DL — LOW (ref 13–17)
MAGNESIUM SERPL-MCNC: 2.2 MG/DL — SIGNIFICANT CHANGE UP (ref 1.6–2.6)
MCHC RBC-ENTMCNC: 24.6 PG — LOW (ref 27–34)
MCHC RBC-ENTMCNC: 28.7 GM/DL — LOW (ref 32–36)
MCV RBC AUTO: 85.5 FL — SIGNIFICANT CHANGE UP (ref 80–100)
NRBC # BLD: 0 /100 WBCS — SIGNIFICANT CHANGE UP
NRBC # FLD: 0 K/UL — SIGNIFICANT CHANGE UP
PHOSPHATE SERPL-MCNC: 2.4 MG/DL — LOW (ref 2.5–4.5)
PLATELET # BLD AUTO: 252 K/UL — SIGNIFICANT CHANGE UP (ref 150–400)
POTASSIUM SERPL-MCNC: 4 MMOL/L — SIGNIFICANT CHANGE UP (ref 3.5–5.3)
POTASSIUM SERPL-SCNC: 4 MMOL/L — SIGNIFICANT CHANGE UP (ref 3.5–5.3)
RBC # BLD: 2.97 M/UL — LOW (ref 4.2–5.8)
RBC # FLD: 22.8 % — HIGH (ref 10.3–14.5)
SODIUM SERPL-SCNC: 144 MMOL/L — SIGNIFICANT CHANGE UP (ref 135–145)
WBC # BLD: 6.32 K/UL — SIGNIFICANT CHANGE UP (ref 3.8–10.5)
WBC # FLD AUTO: 6.32 K/UL — SIGNIFICANT CHANGE UP (ref 3.8–10.5)

## 2022-02-01 RX ORDER — THIAMINE MONONITRATE (VIT B1) 100 MG
1 TABLET ORAL
Qty: 30 | Refills: 0
Start: 2022-02-01 | End: 2022-03-02

## 2022-02-01 RX ORDER — SUCRALFATE 1 G
1 TABLET ORAL
Qty: 120 | Refills: 0
Start: 2022-02-01 | End: 2022-03-02

## 2022-02-01 RX ORDER — CALCIUM ACETATE 667 MG
1 TABLET ORAL
Qty: 90 | Refills: 0
Start: 2022-02-01 | End: 2022-03-02

## 2022-02-01 RX ORDER — SODIUM,POTASSIUM PHOSPHATES 278-250MG
1 POWDER IN PACKET (EA) ORAL ONCE
Refills: 0 | Status: COMPLETED | OUTPATIENT
Start: 2022-02-01 | End: 2022-02-01

## 2022-02-01 RX ORDER — SODIUM BICARBONATE 1 MEQ/ML
1 SYRINGE (ML) INTRAVENOUS
Qty: 120 | Refills: 0
Start: 2022-02-01 | End: 2022-03-02

## 2022-02-01 RX ORDER — FOLIC ACID 0.8 MG
1 TABLET ORAL
Qty: 30 | Refills: 0
Start: 2022-02-01 | End: 2022-03-02

## 2022-02-01 RX ORDER — LIPASE/PROTEASE/AMYLASE 16-48-48K
2 CAPSULE,DELAYED RELEASE (ENTERIC COATED) ORAL
Qty: 180 | Refills: 0
Start: 2022-02-01 | End: 2022-03-02

## 2022-02-01 RX ORDER — AMLODIPINE BESYLATE 2.5 MG/1
1 TABLET ORAL
Qty: 0 | Refills: 0 | DISCHARGE

## 2022-02-01 RX ORDER — SUCRALFATE 1 G
10 TABLET ORAL
Qty: 1200 | Refills: 0
Start: 2022-02-01 | End: 2022-03-02

## 2022-02-01 RX ORDER — PANTOPRAZOLE SODIUM 20 MG/1
1 TABLET, DELAYED RELEASE ORAL
Qty: 60 | Refills: 0
Start: 2022-02-01 | End: 2022-03-02

## 2022-02-01 RX ORDER — PREGABALIN 225 MG/1
1 CAPSULE ORAL
Qty: 30 | Refills: 0
Start: 2022-02-01 | End: 2022-03-02

## 2022-02-01 RX ORDER — FAMOTIDINE 10 MG/ML
1 INJECTION INTRAVENOUS
Qty: 30 | Refills: 0
Start: 2022-02-01 | End: 2022-03-02

## 2022-02-01 RX ORDER — GABAPENTIN 400 MG/1
1 CAPSULE ORAL
Qty: 10 | Refills: 0
Start: 2022-02-01 | End: 2022-02-05

## 2022-02-01 RX ORDER — SENNA PLUS 8.6 MG/1
2 TABLET ORAL
Qty: 60 | Refills: 0
Start: 2022-02-01 | End: 2022-03-02

## 2022-02-01 RX ORDER — CALCIUM ACETATE 667 MG
1 TABLET ORAL
Qty: 0 | Refills: 0 | DISCHARGE

## 2022-02-01 RX ORDER — TAMSULOSIN HYDROCHLORIDE 0.4 MG/1
1 CAPSULE ORAL
Qty: 30 | Refills: 0
Start: 2022-02-01 | End: 2022-03-02

## 2022-02-01 RX ORDER — ONDANSETRON 8 MG/1
1 TABLET, FILM COATED ORAL
Qty: 15 | Refills: 0
Start: 2022-02-01 | End: 2022-02-05

## 2022-02-01 RX ADMIN — Medication 1 TABLET(S): at 11:20

## 2022-02-01 RX ADMIN — Medication 1 GRAM(S): at 17:23

## 2022-02-01 RX ADMIN — OXYCODONE AND ACETAMINOPHEN 2 TABLET(S): 5; 325 TABLET ORAL at 11:26

## 2022-02-01 RX ADMIN — Medication 650 MILLIGRAM(S): at 11:20

## 2022-02-01 RX ADMIN — Medication 1 GRAM(S): at 06:23

## 2022-02-01 RX ADMIN — GABAPENTIN 100 MILLIGRAM(S): 400 CAPSULE ORAL at 17:23

## 2022-02-01 RX ADMIN — Medication 650 MILLIGRAM(S): at 17:23

## 2022-02-01 RX ADMIN — PANTOPRAZOLE SODIUM 40 MILLIGRAM(S): 20 TABLET, DELAYED RELEASE ORAL at 06:23

## 2022-02-01 RX ADMIN — PREGABALIN 1000 MICROGRAM(S): 225 CAPSULE ORAL at 11:19

## 2022-02-01 RX ADMIN — TAMSULOSIN HYDROCHLORIDE 0.4 MILLIGRAM(S): 0.4 CAPSULE ORAL at 21:46

## 2022-02-01 RX ADMIN — Medication 2 CAPSULE(S): at 10:35

## 2022-02-01 RX ADMIN — Medication 650 MILLIGRAM(S): at 06:22

## 2022-02-01 RX ADMIN — Medication 1 PACKET(S): at 09:09

## 2022-02-01 RX ADMIN — OXYCODONE AND ACETAMINOPHEN 2 TABLET(S): 5; 325 TABLET ORAL at 18:18

## 2022-02-01 RX ADMIN — Medication 2 CAPSULE(S): at 12:18

## 2022-02-01 RX ADMIN — GABAPENTIN 100 MILLIGRAM(S): 400 CAPSULE ORAL at 06:23

## 2022-02-01 RX ADMIN — Medication 1 MILLIGRAM(S): at 11:20

## 2022-02-01 RX ADMIN — Medication 1 GRAM(S): at 11:19

## 2022-02-01 RX ADMIN — OXYCODONE AND ACETAMINOPHEN 2 TABLET(S): 5; 325 TABLET ORAL at 01:04

## 2022-02-01 RX ADMIN — PANTOPRAZOLE SODIUM 40 MILLIGRAM(S): 20 TABLET, DELAYED RELEASE ORAL at 17:24

## 2022-02-01 RX ADMIN — HEPARIN SODIUM 5000 UNIT(S): 5000 INJECTION INTRAVENOUS; SUBCUTANEOUS at 17:24

## 2022-02-01 RX ADMIN — Medication 100 MILLIGRAM(S): at 11:20

## 2022-02-01 RX ADMIN — SODIUM CHLORIDE 75 MILLILITER(S): 9 INJECTION, SOLUTION INTRAVENOUS at 07:18

## 2022-02-01 RX ADMIN — Medication 2 CAPSULE(S): at 18:38

## 2022-02-01 RX ADMIN — FAMOTIDINE 20 MILLIGRAM(S): 10 INJECTION INTRAVENOUS at 21:46

## 2022-02-01 RX ADMIN — Medication 667 MILLIGRAM(S): at 12:18

## 2022-02-01 RX ADMIN — HEPARIN SODIUM 5000 UNIT(S): 5000 INJECTION INTRAVENOUS; SUBCUTANEOUS at 06:23

## 2022-02-01 RX ADMIN — OXYCODONE AND ACETAMINOPHEN 2 TABLET(S): 5; 325 TABLET ORAL at 18:07

## 2022-02-01 RX ADMIN — SENNA PLUS 2 TABLET(S): 8.6 TABLET ORAL at 21:46

## 2022-02-01 RX ADMIN — Medication 667 MILLIGRAM(S): at 09:09

## 2022-02-01 NOTE — PROGRESS NOTE ADULT - PROBLEM SELECTOR PLAN 7
heparin subq Q12hr

## 2022-02-01 NOTE — PROGRESS NOTE ADULT - PROBLEM SELECTOR PLAN 5
- trend LFTs  - hx of treatment in the past

## 2022-02-01 NOTE — PROGRESS NOTE ADULT - NUTRITIONAL ASSESSMENT
This patient has been assessed with a concern for Malnutrition and has been determined to have a diagnosis/diagnoses of Severe protein-calorie malnutrition.    This patient is being managed with:   Diet Regular-  No Concentrated Potassium  Low Sodium  Entered: Jan 30 2022  2:53PM      This patient has been assessed with a concern for Malnutrition and has been determined to have a diagnosis/diagnoses of Severe protein-calorie malnutrition.    This patient is being managed with:   Diet Regular-  No Concentrated Potassium  Low Sodium  Entered: Jan 30 2022  2:53PM    
This patient has been assessed with a concern for Malnutrition and has been determined to have a diagnosis/diagnoses of Severe protein-calorie malnutrition.    This patient is being managed with:   Diet Regular-  No Concentrated Potassium  Low Sodium  Entered: Jan 30 2022  2:53PM

## 2022-02-01 NOTE — PROGRESS NOTE ADULT - PROBLEM SELECTOR PLAN 3
Assessment:  - hx of pancreatitis, unclear etiology however could be from chronic ETOH abuse in the past  - lipase 204  - CT from [place] shows no fat stranding in the pancreas    Plan:  - LR 75cc/hr fore rehydration  - monitor for now
Assessment:  - hx of pancreatitis, unclear etiology however could be from chronic ETOH abuse in the past  - lipase 204  - CT from [place] shows no fat stranding in the pancreas    Plan:  - LR 75cc/hr fore rehydration  - monitor for now
Assessment:  - hx of pancreatitis, unclear etiology however could be from chronic ETOH abuse in the past  - CT from [place] shows no fat stranding in the pancreas
Assessment:  - hx of pancreatitis, unclear etiology however could be from chronic ETOH abuse in the past  - CT from [place] shows no fat stranding in the pancreas
Assessment:  - hx of pancreatitis, unclear etiology however could be from chronic ETOH abuse in the past  - CT from [place] shows no fat stranding in the pancreas
Assessment:  - hx of pancreatitis, unclear etiology however could be from chronic ETOH abuse in the past  - CT from [place] shows no fat stranding in the pancreas

## 2022-02-01 NOTE — PROGRESS NOTE ADULT - PROBLEM SELECTOR PROBLEM 3
Chronic pancreatitis

## 2022-02-01 NOTE — DISCHARGE NOTE NURSING/CASE MANAGEMENT/SOCIAL WORK - PATIENT PORTAL LINK FT
You can access the FollowMyHealth Patient Portal offered by [facility] by registering at the following website: [email or web address]. By joining [facility]’s FollowMyHealth portal, you will also be able to view your health information using other applications (apps) compatible with our system.

## 2022-02-01 NOTE — DISCHARGE NOTE NURSING/CASE MANAGEMENT/SOCIAL WORK - NSDCFUADDAPPT_GEN_ALL_CORE_FT
Follow up with your primary care physician for further monitoring in 1-2 weeks. Please call to arrange appointment.  Follow up with gastroenterology within 1-2 weeks of discharge.  Follow up with surgery, Dr. Swanson within 1-2 weeks of discharge.

## 2022-02-01 NOTE — CHART NOTE - NSCHARTNOTEFT_GEN_A_CORE
Will send 5 day percocet per discussion with Dr. suresh  ISTOP Reference #: [id number]    Layne Elizondo PA-C  Department of Medicine  Pager [phone]

## 2022-02-01 NOTE — PROGRESS NOTE ADULT - PROBLEM SELECTOR PLAN 6
- monitor for now, Cr at his baseline when compared to his previous labs
- monitor for now, Cr at his baseline when compared to his previous labs  - IV fluids as above  - consider neprho consult
- monitor for now, Cr at his baseline when compared to his previous labs  - IV fluids as above  - consider neprho consult
- monitor for now, Cr at his baseline when compared to his previous labs

## 2022-02-01 NOTE — PROGRESS NOTE ADULT - PROBLEM SELECTOR PROBLEM 5
Hepatitis C virus infection

## 2022-02-01 NOTE — PROGRESS NOTE ADULT - PROBLEM SELECTOR PLAN 4
Assessment:  - patient denies ETOH use recently but hx of abuse in the past  - no ETOH level done  - physical exam shows no signs of withdrawal    Plan:  - monitor for now
Assessment:  - patient denies ETOH use recently but hx of abuse in the past  - no ETOH level done  - physical exam shows no signs of withdrawal
Assessment:  - patient denies ETOH use recently but hx of abuse in the past  - no ETOH level done  - physical exam shows no signs of withdrawal    Plan:  - monitor for now
Assessment:  - patient denies ETOH use recently but hx of abuse in the past  - no ETOH level done  - physical exam shows no signs of withdrawal

## 2022-02-01 NOTE — PROGRESS NOTE ADULT - PROBLEM SELECTOR PLAN 2
Assessment:  - hx of gastritis on EGD as per previous notes
Assessment:  - hx of gastritis on EGD as per previous notes    Plan:  - as above
Assessment:  - hx of gastritis on EGD as per previous notes
Assessment:  - hx of gastritis on EGD as per previous notes
Assessment:  - hx of gastritis on EGD as per previous notes    Plan:  - as above
Assessment:  - hx of gastritis on EGD as per previous notes

## 2022-02-01 NOTE — PROGRESS NOTE ADULT - PROBLEM SELECTOR PROBLEM 1
Hiatal hernia with GERD and esophagitis

## 2022-02-01 NOTE — DISCHARGE NOTE NURSING/CASE MANAGEMENT/SOCIAL WORK - NSDCPEFALRISK_GEN_ALL_CORE
For information on Fall & Injury Prevention, visit: [email or web address] OR  [email or web address] OR  https://www.cdc.gov/steadi/patient.html

## 2022-02-02 VITALS
RESPIRATION RATE: 16 BRPM | DIASTOLIC BLOOD PRESSURE: 74 MMHG | HEART RATE: 70 BPM | OXYGEN SATURATION: 100 % | TEMPERATURE: 98 F | SYSTOLIC BLOOD PRESSURE: 136 MMHG

## 2022-02-02 DIAGNOSIS — I10 ESSENTIAL (PRIMARY) HYPERTENSION: ICD-10-CM

## 2022-02-02 LAB
ANION GAP SERPL CALC-SCNC: 8 MMOL/L — SIGNIFICANT CHANGE UP (ref 7–14)
BUN SERPL-MCNC: 23 MG/DL — SIGNIFICANT CHANGE UP (ref 7–23)
CALCIUM SERPL-MCNC: 8.2 MG/DL — LOW (ref 8.4–10.5)
CHLORIDE SERPL-SCNC: 110 MMOL/L — HIGH (ref 98–107)
CO2 SERPL-SCNC: 26 MMOL/L — SIGNIFICANT CHANGE UP (ref 22–31)
CREAT SERPL-MCNC: 3.14 MG/DL — HIGH (ref 0.5–1.3)
GLUCOSE SERPL-MCNC: 93 MG/DL — SIGNIFICANT CHANGE UP (ref 70–99)
HCT VFR BLD CALC: 25.7 % — LOW (ref 39–50)
HGB BLD-MCNC: 7.4 G/DL — LOW (ref 13–17)
MAGNESIUM SERPL-MCNC: 2.2 MG/DL — SIGNIFICANT CHANGE UP (ref 1.6–2.6)
MCHC RBC-ENTMCNC: 24.7 PG — LOW (ref 27–34)
MCHC RBC-ENTMCNC: 28.8 GM/DL — LOW (ref 32–36)
MCV RBC AUTO: 86 FL — SIGNIFICANT CHANGE UP (ref 80–100)
NRBC # BLD: 0 /100 WBCS — SIGNIFICANT CHANGE UP
NRBC # FLD: 0 K/UL — SIGNIFICANT CHANGE UP
PHOSPHATE SERPL-MCNC: 2.8 MG/DL — SIGNIFICANT CHANGE UP (ref 2.5–4.5)
PLATELET # BLD AUTO: 241 K/UL — SIGNIFICANT CHANGE UP (ref 150–400)
POTASSIUM SERPL-MCNC: 4.3 MMOL/L — SIGNIFICANT CHANGE UP (ref 3.5–5.3)
POTASSIUM SERPL-SCNC: 4.3 MMOL/L — SIGNIFICANT CHANGE UP (ref 3.5–5.3)
RBC # BLD: 2.99 M/UL — LOW (ref 4.2–5.8)
RBC # FLD: 23.3 % — HIGH (ref 10.3–14.5)
SODIUM SERPL-SCNC: 144 MMOL/L — SIGNIFICANT CHANGE UP (ref 135–145)
WBC # BLD: 5.2 K/UL — SIGNIFICANT CHANGE UP (ref 3.8–10.5)
WBC # FLD AUTO: 5.2 K/UL — SIGNIFICANT CHANGE UP (ref 3.8–10.5)

## 2022-02-02 RX ADMIN — OXYCODONE AND ACETAMINOPHEN 2 TABLET(S): 5; 325 TABLET ORAL at 08:46

## 2022-02-02 RX ADMIN — Medication 2 CAPSULE(S): at 12:38

## 2022-02-02 RX ADMIN — Medication 650 MILLIGRAM(S): at 00:42

## 2022-02-02 RX ADMIN — Medication 2 CAPSULE(S): at 08:16

## 2022-02-02 RX ADMIN — Medication 1 MILLIGRAM(S): at 12:36

## 2022-02-02 RX ADMIN — Medication 100 MILLIGRAM(S): at 12:36

## 2022-02-02 RX ADMIN — SODIUM CHLORIDE 75 MILLILITER(S): 9 INJECTION, SOLUTION INTRAVENOUS at 05:23

## 2022-02-02 RX ADMIN — HEPARIN SODIUM 5000 UNIT(S): 5000 INJECTION INTRAVENOUS; SUBCUTANEOUS at 05:25

## 2022-02-02 RX ADMIN — Medication 650 MILLIGRAM(S): at 05:25

## 2022-02-02 RX ADMIN — GABAPENTIN 100 MILLIGRAM(S): 400 CAPSULE ORAL at 05:25

## 2022-02-02 RX ADMIN — OXYCODONE AND ACETAMINOPHEN 2 TABLET(S): 5; 325 TABLET ORAL at 08:16

## 2022-02-02 RX ADMIN — Medication 650 MILLIGRAM(S): at 12:35

## 2022-02-02 RX ADMIN — Medication 1 GRAM(S): at 12:35

## 2022-02-02 RX ADMIN — Medication 1 GRAM(S): at 00:42

## 2022-02-02 RX ADMIN — PREGABALIN 1000 MICROGRAM(S): 225 CAPSULE ORAL at 12:35

## 2022-02-02 RX ADMIN — Medication 1 GRAM(S): at 05:25

## 2022-02-02 RX ADMIN — PANTOPRAZOLE SODIUM 40 MILLIGRAM(S): 20 TABLET, DELAYED RELEASE ORAL at 05:25

## 2022-02-02 RX ADMIN — Medication 1 TABLET(S): at 12:36

## 2022-02-02 RX ADMIN — OXYCODONE AND ACETAMINOPHEN 2 TABLET(S): 5; 325 TABLET ORAL at 01:12

## 2022-02-02 RX ADMIN — OXYCODONE AND ACETAMINOPHEN 2 TABLET(S): 5; 325 TABLET ORAL at 00:42

## 2022-02-02 NOTE — PROGRESS NOTE ADULT - ASSESSMENT
61 M with pmhx of ETOH abuse, chronic pancreatitis, Hep C, gout, gastric perf in 2019 presented to the ED at [place] for abdominal pain and n/v. Patient known to have similar complaints previously. Found to have esophagitis and gastritis. Admit for esophagitis management.
61 M with pmhx of ETOH abuse, chronic pancreatitis, Hep C, gout, gastric perf in 2019 presented to the ED at [place] for abdominal pain and n/v. Patient known to have similar complaints previously. Found to have esophagitis and gastritis. Admit for esophagitis management.
This is a 61 M with pmhx of ETOH abuse, chronic pancreatitis, Hep C, gout, gastric perf in 2019 presented to the ED at [place] for abdominal pain and n/v. The patient was transferred to [facility] for "offloading" due to pandemic. Had full GI work up, EGD (as per previous notes) showed hiatal hernia and esophageal thickening, suspected esophagitis.  Nephrology consulted for renal failure.    A/P    CKD 4  baseline ~3.5   scr remains relatively stable,  on lactated ringers 75cc/hr by team   avoid further nephrotoxins, NSAIDS,RCA   Hepatitis C positive   monitor BMP, u/O    HTN  fluctuating   not on bp meds  monitor     CKD;MBD  hypophosphatemia   D/C Phoslo  PO4 repleted    monitor Ca, PO4     
61 M with pmhx of ETOH abuse, chronic pancreatitis, Hep C, gout, gastric perf in 2019 presented to the ED at [place] for abdominal pain and n/v.    Nausea/Vomiting    continue PPI BID, Carafate QID and Pepcid QHS  Reglan PRN if QTc allows for it    upper gastrointestinal endoscopy findings noted  f/u path  d/w patient    Chronic Pancreatitis   from etoh use   cont creon   cholestyramine daily if diarrhea  pain control prn     I reviewed the overnight course of events on the unit, re-confirming the patient history. I discussed the care with the patient and their family. The plan of care was discussed with the physician assistant and modifications were made to the notation where appropriate. Differential diagnosis and plan of care discussed with patient after the evaluation. Advanced care planning was discussed with patient and family.  Advanced care planning forms were reviewed and discussed.  Risks, benefits and alternatives of gastroenterologic procedures were discussed in detail and all questions were answered. 35 minutes spent on total encounter of which more than fifty percent of the encounter was spent counseling and/or coordinating care by the attending physician. 
61 M with pmhx of ETOH abuse, chronic pancreatitis, Hep C, gout, gastric perf in 2019 presented to the ED at [place] for abdominal pain and n/v.    Nausea/Vomiting  resolved  s/p EGD; f/u pathology    continue PPI BID, Carafate QID and Pepcid QHS  soft diet as tolerated  outpatient f/u in our office in 3 weeks; [phone]  d/w patient    Chronic Pancreatitis   from etoh use   cont creon   cholestyramine daily if diarrhea  pain control prn     I reviewed the overnight course of events on the unit, re-confirming the patient history. I discussed the care with the patient and their family. The plan of care was discussed with the physician assistant and modifications were made to the notation where appropriate. Differential diagnosis and plan of care discussed with patient after the evaluation. Advanced care planning was discussed with patient and family.  Advanced care planning forms were reviewed and discussed.  Risks, benefits and alternatives of gastroenterologic procedures were discussed in detail and all questions were answered. 35 minutes spent on total encounter of which more than fifty percent of the encounter was spent counseling and/or coordinating care by the attending physician. 
61 M with pmhx of ETOH abuse, chronic pancreatitis, Hep C, gout, gastric perf in 2019 presented to the ED at [place] for abdominal pain and n/v.    Nausea/Vomiting    continue PPI BID, Carafate QID and Pepcid QHS  Reglan PRN if QTc allows for it    upper gastrointestinal endoscopy findings noted  f/u path  d/w patient    Chronic Pancreatitis   from etoh use   cont creon   cholestyramine daily if diarrhea  pain control prn     I reviewed the overnight course of events on the unit, re-confirming the patient history. I discussed the care with the patient and their family. The plan of care was discussed with the physician assistant and modifications were made to the notation where appropriate. Differential diagnosis and plan of care discussed with patient after the evaluation. Advanced care planning was discussed with patient and family.  Advanced care planning forms were reviewed and discussed.  Risks, benefits and alternatives of gastroenterologic procedures were discussed in detail and all questions were answered. 35 minutes spent on total encounter of which more than fifty percent of the encounter was spent counseling and/or coordinating care by the attending physician. 
This is a 61 M with pmhx of ETOH abuse, chronic pancreatitis, Hep C, gout, gastric perf in 2019 presented to the ED at [place] for abdominal pain and n/v. The patient was transferred to [facility] for "offloading" due to pandemic. Had full GI work up, EGD (as per previous notes) showed hiatal hernia and esophageal thickening, suspected esophagitis.  Nephrology consulted for renal failure.    A/P    CKD 4  baseline ~3.5   scr remains relatively stable,  avoid further nephrotoxins, NSAIDS,RCA   Hepatitis C positive   monitor BMP, u/O    HTN  not on bp meds  monitor     CKD;MBD  hypophosphatemia   D/C Phoslo  monitor PO4,Ca   PO4 repleted    monitor Ca, PO4     
61 M with pmhx of ETOH abuse, chronic pancreatitis, Hep C, gout, gastric perf in 2019 presented to the ED at [place] for abdominal pain and n/v.    Nausea/Vomiting  s/p EGD; f/u pathology    continue PPI BID, Carafate QID and Pepcid QHS  Reglan PRN if QTc allows for it   soft diet as tolerated  d/w patient    Chronic Pancreatitis   from etoh use   cont creon   cholestyramine daily if diarrhea  pain control prn     I reviewed the overnight course of events on the unit, re-confirming the patient history. I discussed the care with the patient and their family. The plan of care was discussed with the physician assistant and modifications were made to the notation where appropriate. Differential diagnosis and plan of care discussed with patient after the evaluation. Advanced care planning was discussed with patient and family.  Advanced care planning forms were reviewed and discussed.  Risks, benefits and alternatives of gastroenterologic procedures were discussed in detail and all questions were answered. 35 minutes spent on total encounter of which more than fifty percent of the encounter was spent counseling and/or coordinating care by the attending physician. 
61 M with pmhx of ETOH abuse, chronic pancreatitis, Hep C, gout, gastric perf in 2019 presented to the ED at [place] for abdominal pain and n/v.    Nausea/Vomiting  resolved  s/p EGD; f/u pathology    continue PPI BID, Carafate QID and Pepcid QHS  soft diet as tolerated  outpatient f/u in our office in 3 weeks; [phone]  d/w patient    Chronic Pancreatitis   from etoh use   cont creon   cholestyramine daily if diarrhea  pain control prn     I reviewed the overnight course of events on the unit, re-confirming the patient history. I discussed the care with the patient and their family. The plan of care was discussed with the physician assistant and modifications were made to the notation where appropriate. Differential diagnosis and plan of care discussed with patient after the evaluation. Advanced care planning was discussed with patient and family.  Advanced care planning forms were reviewed and discussed.  Risks, benefits and alternatives of gastroenterologic procedures were discussed in detail and all questions were answered. 35 minutes spent on total encounter of which more than fifty percent of the encounter was spent counseling and/or coordinating care by the attending physician. 
61 M with pmhx of ETOH abuse, chronic pancreatitis, Hep C, gout, gastric perf in 2019 presented to the ED at [place] for abdominal pain and n/v. Patient known to have similar complaints previously. Found to have esophagitis and gastritis. Admit for esophagitis management.
61 M with pmhx of ETOH abuse, chronic pancreatitis, Hep C, gout, gastric perf in 2019 presented to the ED at [place] for abdominal pain and n/v. Patient known to have similar complaints previously. Found to have esophagitis and gastritis. Admit for esophagitis management.
61 M with pmhx of ETOH abuse, chronic pancreatitis, Hep C, gout, gastric perf in 2019 presented to the ED at [place] for abdominal pain and n/v. Patient known to have similar complaints previously. Found to have esophagitis and gastritis. Admit for esophagitis management.
61 M with pmhx of ETOH abuse, chronic pancreatitis, Hep C, gout, gastric perf in 2019 presented to the ED at [place] for abdominal pain and n/v. Patient known to have similar complaints previously. Found to have esophagitis and gastritis. Admit for esophagitis management.

## 2022-02-02 NOTE — CHART NOTE - NSCHARTNOTEFT_GEN_A_CORE
Notified by RN, EMS arrived to floor to transport pt home, pt refused to go home, wants to be seen by attending in am, states he is still having pain, and wants better pain regimen to go home with. States Percocet 2 tabs works better, was prescribed 1 tab to go home with, that he is now refusing. Attending notified of above, 24hr discharge notive to be given to pt. Notified by RN, EMS arrived to floor to transport pt home, pt refused to go home, wants to be seen by attending in am, states he is still having pain, and wants better pain regimen to go home with. States Percocet 2 tabs works better, was prescribed 1 tab to go home with, that he is now refusing. Attending notified of above, 24hr discharge notice given.

## 2022-02-02 NOTE — PROGRESS NOTE ADULT - SUBJECTIVE AND OBJECTIVE BOX
INTERVAL HPI/OVERNIGHT EVENTS:    eating better; denied any further vomiting    MEDICATIONS  (STANDING):  calcium acetate 667 milliGRAM(s) Oral three times a day with meals  cyanocobalamin 1000 MICROGram(s) Oral daily  famotidine    Tablet 20 milliGRAM(s) Oral at bedtime  folic acid 1 milliGRAM(s) Oral daily  gabapentin 100 milliGRAM(s) Oral every 12 hours  heparin   Injectable 5000 Unit(s) SubCutaneous every 12 hours  influenza   Vaccine 0.5 milliLiter(s) IntraMuscular once  lactated ringers. 1000 milliLiter(s) (75 mL/Hr) IV Continuous <Continuous>  multivitamin 1 Tablet(s) Oral daily  pancrelipase  (CREON  6,000 Lipase Units) 2 Capsule(s) Oral three times a day with meals  pantoprazole  Injectable 40 milliGRAM(s) IV Push two times a day  senna 2 Tablet(s) Oral at bedtime  sodium bicarbonate 650 milliGRAM(s) Oral every 6 hours  sucralfate suspension 1 Gram(s) Oral four times a day  tamsulosin 0.4 milliGRAM(s) Oral at bedtime  thiamine 100 milliGRAM(s) Oral daily    MEDICATIONS  (PRN):  acetaminophen     Tablet .. 650 milliGRAM(s) Oral every 6 hours PRN Temp greater or equal to 38C (100.4F), Mild Pain (1 - 3)  aluminum hydroxide/magnesium hydroxide/simethicone Suspension 30 milliLiter(s) Oral every 4 hours PRN Dyspepsia  melatonin 3 milliGRAM(s) Oral at bedtime PRN Insomnia  metoclopramide Injectable 5 milliGRAM(s) IV Push every 8 hours PRN nausea/vomiting  ondansetron Injectable 4 milliGRAM(s) IV Push every 8 hours PRN Nausea and/or Vomiting  oxycodone    5 mG/acetaminophen 325 mG 1 Tablet(s) Oral every 6 hours PRN Moderate Pain (4 - 6)  oxycodone    5 mG/acetaminophen 325 mG 2 Tablet(s) Oral every 6 hours PRN Severe Pain (7 - 10)      Allergies    No Known Allergies    Intolerances        Review of Systems:    General:  No wt loss, fevers, chills, night sweats, fatigue   Eyes:  Good vision, no reported pain  ENT:  No sore throat, pain, runny nose, dysphagia  CV:  No pain, palpitations, hypo/hypertension  Resp:  No dyspnea, cough, tachypnea, wheezing  GI:  No pain, No nausea, No vomiting, No diarrhea, No constipation, No weight loss, No fever, No pruritis, No rectal bleeding, No melena, No dysphagia  :  No pain, bleeding, incontinence, nocturia  Muscle:  No pain, weakness  Neuro:  No weakness, tingling, memory problems  Psych:  No fatigue, insomnia, mood problems, depression  Endocrine:  No polyuria, polydypsia, cold/heat intolerance  Heme:  No petechiae, ecchymosis, easy bruisability  Skin:  No rash, tattoos, scars, edema      Vital Signs Last 24 Hrs  T(C): 36.8 (31 Jan 2022 05:19), Max: 37 (30 Jan 2022 21:44)  T(F): 98.2 (31 Jan 2022 05:19), Max: 98.6 (30 Jan 2022 21:44)  HR: 75 (31 Jan 2022 05:19) (67 - 75)  BP: 140/73 (31 Jan 2022 05:19) (136/93 - 140/73)  BP(mean): --  RR: 17 (31 Jan 2022 05:19) (17 - 18)  SpO2: 100% (31 Jan 2022 05:19) (98% - 100%)    PHYSICAL EXAM:    Constitutional: NAD  HEENT: EOMI, throat clear  Neck: No LAD, supple  Respiratory: CTA and P  Cardiovascular: S1 and S2, RRR, no M  Gastrointestinal: BS+, soft, NT/ND, neg HSM,  Extremities: No peripheral edema, neg clubbing, cyanosis  Vascular: 2+ peripheral pulses  Neurological: A/O x 3, no focal deficits  Psychiatric: Normal mood, normal affect  Skin: No rashes      LABS:                        7.5    5.81  )-----------( 275      ( 31 Jan 2022 07:50 )             26.2     01-31    144  |  109<H>  |  25<H>  ----------------------------<  90  4.0   |  29  |  3.09<H>    Ca    8.3<L>      31 Jan 2022 07:50  Phos  3.2     01-31  Mg     2.50     01-31    TPro  5.7<L>  /  Alb  3.1<L>  /  TBili  <0.2  /  DBili  x   /  AST  10  /  ALT  6   /  AlkPhos  75  01-31          RADIOLOGY & ADDITIONAL TESTS:  
INTERVAL HPI/OVERNIGHT EVENTS:    tolerating PO well  no further nausea or vomiting   epigastric pain continues to improve  having gi fxn    MEDICATIONS  (STANDING):  cyanocobalamin 1000 MICROGram(s) Oral daily  famotidine    Tablet 20 milliGRAM(s) Oral at bedtime  folic acid 1 milliGRAM(s) Oral daily  gabapentin 100 milliGRAM(s) Oral every 12 hours  heparin   Injectable 5000 Unit(s) SubCutaneous every 12 hours  influenza   Vaccine 0.5 milliLiter(s) IntraMuscular once  lactated ringers. 1000 milliLiter(s) (75 mL/Hr) IV Continuous <Continuous>  multivitamin 1 Tablet(s) Oral daily  pancrelipase  (CREON  6,000 Lipase Units) 2 Capsule(s) Oral three times a day with meals  pantoprazole  Injectable 40 milliGRAM(s) IV Push two times a day  senna 2 Tablet(s) Oral at bedtime  sodium bicarbonate 650 milliGRAM(s) Oral every 6 hours  sucralfate suspension 1 Gram(s) Oral four times a day  tamsulosin 0.4 milliGRAM(s) Oral at bedtime  thiamine 100 milliGRAM(s) Oral daily    MEDICATIONS  (PRN):  acetaminophen     Tablet .. 650 milliGRAM(s) Oral every 6 hours PRN Temp greater or equal to 38C (100.4F), Mild Pain (1 - 3)  aluminum hydroxide/magnesium hydroxide/simethicone Suspension 30 milliLiter(s) Oral every 4 hours PRN Dyspepsia  melatonin 3 milliGRAM(s) Oral at bedtime PRN Insomnia  metoclopramide Injectable 5 milliGRAM(s) IV Push every 8 hours PRN nausea/vomiting  ondansetron Injectable 4 milliGRAM(s) IV Push every 8 hours PRN Nausea and/or Vomiting  oxycodone    5 mG/acetaminophen 325 mG 1 Tablet(s) Oral every 6 hours PRN Moderate Pain (4 - 6)  oxycodone    5 mG/acetaminophen 325 mG 2 Tablet(s) Oral every 6 hours PRN Severe Pain (7 - 10)      Allergies    No Known Allergies    Intolerances        Review of Systems:    General:  No wt loss, fevers, chills, night sweats, fatigue   Eyes:  Good vision, no reported pain  ENT:  No sore throat, pain, runny nose, dysphagia  CV:  No pain, palpitations, hypo/hypertension  Resp:  No dyspnea, cough, tachypnea, wheezing  GI:  No pain, No nausea, No vomiting, No diarrhea, No constipation, No weight loss, No fever, No pruritis, No rectal bleeding, No melena, No dysphagia  :  No pain, bleeding, incontinence, nocturia  Muscle:  No pain, weakness  Neuro:  No weakness, tingling, memory problems  Psych:  No fatigue, insomnia, mood problems, depression  Endocrine:  No polyuria, polydypsia, cold/heat intolerance  Heme:  No petechiae, ecchymosis, easy bruisability  Skin:  No rash, tattoos, scars, edema      Vital Signs Last 24 Hrs  T(C): 36.5 (02 Feb 2022 05:22), Max: 36.7 (01 Feb 2022 14:33)  T(F): 97.7 (02 Feb 2022 05:22), Max: 98.1 (01 Feb 2022 14:33)  HR: 72 (02 Feb 2022 05:22) (72 - 75)  BP: 147/81 (02 Feb 2022 05:22) (135/72 - 156/74)  BP(mean): --  RR: 17 (02 Feb 2022 05:22) (17 - 20)  SpO2: 100% (02 Feb 2022 05:22) (100% - 100%)    PHYSICAL EXAM:    Constitutional: NAD  HEENT: EOMI, throat clear  Neck: No LAD, supple  Respiratory: CTA and P  Cardiovascular: S1 and S2, RRR, no M  Gastrointestinal: BS+, soft, NT/ND, neg HSM,  Extremities: No peripheral edema, neg clubbing, cyanosis  Vascular: 2+ peripheral pulses  Neurological: A/O x 3, no focal deficits  Psychiatric: Normal mood, normal affect  Skin: No rashes      LABS:                        7.4    5.20  )-----------( 241      ( 02 Feb 2022 06:27 )             25.7     02-02    144  |  110<H>  |  23  ----------------------------<  93  4.3   |  26  |  3.14<H>    Ca    8.2<L>      02 Feb 2022 06:27  Phos  2.8     02-02  Mg     2.20     02-02            RADIOLOGY & ADDITIONAL TESTS:  
[facility]  Avila Patel PA-C  [facility] [place] [address] [phone]      INTERVAL HPI/OVERNIGHT EVENTS:    slightly better    MEDICATIONS  (STANDING):  calcium acetate 667 milliGRAM(s) Oral three times a day with meals  cyanocobalamin 1000 MICROGram(s) Oral daily  famotidine    Tablet 20 milliGRAM(s) Oral at bedtime  folic acid 1 milliGRAM(s) Oral daily  gabapentin 100 milliGRAM(s) Oral every 12 hours  heparin   Injectable 5000 Unit(s) SubCutaneous every 12 hours  influenza   Vaccine 0.5 milliLiter(s) IntraMuscular once  lactated ringers. 1000 milliLiter(s) (75 mL/Hr) IV Continuous <Continuous>  multivitamin 1 Tablet(s) Oral daily  pancrelipase  (CREON  6,000 Lipase Units) 2 Capsule(s) Oral three times a day with meals  pantoprazole  Injectable 40 milliGRAM(s) IV Push two times a day  senna 2 Tablet(s) Oral at bedtime  sodium bicarbonate 650 milliGRAM(s) Oral every 6 hours  sucralfate suspension 1 Gram(s) Oral four times a day  tamsulosin 0.4 milliGRAM(s) Oral at bedtime  thiamine 100 milliGRAM(s) Oral daily    MEDICATIONS  (PRN):  acetaminophen     Tablet .. 650 milliGRAM(s) Oral every 6 hours PRN Temp greater or equal to 38C (100.4F), Mild Pain (1 - 3)  aluminum hydroxide/magnesium hydroxide/simethicone Suspension 30 milliLiter(s) Oral every 4 hours PRN Dyspepsia  melatonin 3 milliGRAM(s) Oral at bedtime PRN Insomnia  metoclopramide Injectable 5 milliGRAM(s) IV Push every 8 hours PRN nausea/vomiting  ondansetron Injectable 4 milliGRAM(s) IV Push every 8 hours PRN Nausea and/or Vomiting  oxycodone    5 mG/acetaminophen 325 mG 1 Tablet(s) Oral every 6 hours PRN Moderate Pain (4 - 6)  oxycodone    5 mG/acetaminophen 325 mG 2 Tablet(s) Oral every 6 hours PRN Severe Pain (7 - 10)      Allergies    No Known Allergies    Intolerances        ROS:   General:  No wt loss, fevers, chills, night sweats, fatigue,   Eyes:  Good vision, no reported pain  ENT:  No sore throat, pain, runny nose, dysphagia  CV:  No pain, palpitations, hypo/hypertension  Resp:  No dyspnea, cough, tachypnea, wheezing  GI:  No pain, No nausea, No vomiting, No diarrhea, No constipation, No weight loss, No fever, No pruritis, No rectal bleeding, No tarry stools, No dysphagia,  :  No pain, bleeding, incontinence, nocturia  Muscle:  No pain, weakness  Neuro:  No weakness, tingling, memory problems  Psych:  No fatigue, insomnia, mood problems, depression  Endocrine:  No polyuria, polydipsia, cold/heat intolerance  Heme:  No petechiae, ecchymosis, easy bruisability  Skin:  No rash, tattoos, scars, edema      PHYSICAL EXAM:   Vital Signs:  Vital Signs Last 24 Hrs  T(C): 36.6 (29 Jan 2022 14:23), Max: 36.7 (28 Jan 2022 23:55)  T(F): 97.8 (29 Jan 2022 14:23), Max: 98.1 (28 Jan 2022 23:55)  HR: 73 (29 Jan 2022 14:23) (71 - 83)  BP: 117/71 (29 Jan 2022 14:23) (114/60 - 140/69)  BP(mean): 87 (28 Jan 2022 18:00) (87 - 87)  RR: 17 (29 Jan 2022 14:23) (16 - 19)  SpO2: 99% (29 Jan 2022 14:23) (98% - 100%)  Daily     Daily     GENERAL:  Appears stated age,   HEENT:  NC/AT,    CHEST:  Full & symmetric excursion,   HEART:  Regular rhythm,  ABDOMEN:  Soft, non-tender, non-distended,  EXTEREMITIES:  no cyanosis  SKIN:  No rash  NEURO:  Alert,       LABS:                        7.6    6.28  )-----------( 305      ( 29 Jan 2022 06:12 )             25.8     01-29    141  |  106  |  30<H>  ----------------------------<  96  3.8   |  29  |  3.23<H>    Ca    7.8<L>      29 Jan 2022 06:12  Phos  3.8     01-29  Mg     3.00     01-29    TPro  5.6<L>  /  Alb  3.2<L>  /  TBili  <0.2  /  DBili  <0.2  /  AST  13  /  ALT  6   /  AlkPhos  80  01-29    PT/INR - ( 28 Jan 2022 06:54 )   PT: 11.4 sec;   INR: 0.99 ratio               RADIOLOGY & ADDITIONAL TESTS:  
[facility]   MD MICHELLE FOSTER MD, PA INJUNG KO NP    TEL:  OFFICE: [phone]  DR ORTIZ CELL: [phone]  DR. MASON CELL: [phone]  SHOAIB HUNTER CELL: [phone]  MARIA ESTHER SNEED CELL :[phone]    From 5pm-7am Answering Service [phone]    -- RENAL FOLLOW UP NOTE ---Date of Service 02-01-22 @ 13:36    Patient is a 61y old  Male who presents with a chief complaint of abdominal pain (01 Feb 2022 10:44)    Patient seen and examined at bedside. No chest pain/sob    VITALS:  T(F): 98.8 (02-01-22 @ 05:37), Max: 98.8 (02-01-22 @ 05:37)  HR: 84 (02-01-22 @ 05:37)  BP: 137/68 (02-01-22 @ 05:37)  RR: 16 (02-01-22 @ 05:37)  SpO2: 100% (02-01-22 @ 05:37)  Wt(kg): --        PHYSICAL EXAM:  Constitutional: NAD  Neck: No JVD  Respiratory: CTAB, no wheezes, rales or rhonchi  Cardiovascular: S1, S2, RRR  Gastrointestinal: BS+, soft, NT/ND  Extremities: No peripheral edema    Hospital Medications:   MEDICATIONS  (STANDING):  calcium acetate 667 milliGRAM(s) Oral three times a day with meals  cyanocobalamin 1000 MICROGram(s) Oral daily  famotidine    Tablet 20 milliGRAM(s) Oral at bedtime  folic acid 1 milliGRAM(s) Oral daily  gabapentin 100 milliGRAM(s) Oral every 12 hours  heparin   Injectable 5000 Unit(s) SubCutaneous every 12 hours  influenza   Vaccine 0.5 milliLiter(s) IntraMuscular once  lactated ringers. 1000 milliLiter(s) (75 mL/Hr) IV Continuous <Continuous>  multivitamin 1 Tablet(s) Oral daily  pancrelipase  (CREON  6,000 Lipase Units) 2 Capsule(s) Oral three times a day with meals  pantoprazole  Injectable 40 milliGRAM(s) IV Push two times a day  senna 2 Tablet(s) Oral at bedtime  sodium bicarbonate 650 milliGRAM(s) Oral every 6 hours  sucralfate suspension 1 Gram(s) Oral four times a day  tamsulosin 0.4 milliGRAM(s) Oral at bedtime  thiamine 100 milliGRAM(s) Oral daily      LABS:  02-01    144  |  110<H>  |  23  ----------------------------<  91  4.0   |  27  |  3.14<H>    Ca    8.3<L>      01 Feb 2022 06:29  Phos  2.4     02-01  Mg     2.20     02-01    TPro  5.7<L>  /  Alb  3.1<L>  /  TBili  <0.2  /  DBili      /  AST  10  /  ALT  6   /  AlkPhos  75  01-31    Creatinine Trend: 3.14 <--, 3.09 <--, 3.19 <--, 3.23 <--, 3.57 <--, 3.65 <--, 3.77 <--    Phosphorus Level, Serum: 2.4 mg/dL (02-01 @ 06:29)                              7.3    6.32  )-----------( 252      ( 01 Feb 2022 06:29 )             25.4     Urine Studies:      Iron 15, TIBC 318, %sat 5      [12-22-21 @ 09:25]  Ferritin 15      [12-22-21 @ 09:27]  HbA1c 5.0      [03-22-19 @ 09:03]        RADIOLOGY & ADDITIONAL STUDIES:  
INTERVAL HPI/OVERNIGHT EVENTS:    eating better   epigastric pain improving this morning   no n/v; no melena or hematochezia      MEDICATIONS  (STANDING):  calcium acetate 667 milliGRAM(s) Oral three times a day with meals  cyanocobalamin 1000 MICROGram(s) Oral daily  famotidine    Tablet 20 milliGRAM(s) Oral at bedtime  folic acid 1 milliGRAM(s) Oral daily  gabapentin 100 milliGRAM(s) Oral every 12 hours  heparin   Injectable 5000 Unit(s) SubCutaneous every 12 hours  influenza   Vaccine 0.5 milliLiter(s) IntraMuscular once  lactated ringers. 1000 milliLiter(s) (75 mL/Hr) IV Continuous <Continuous>  multivitamin 1 Tablet(s) Oral daily  pancrelipase  (CREON  6,000 Lipase Units) 2 Capsule(s) Oral three times a day with meals  pantoprazole  Injectable 40 milliGRAM(s) IV Push two times a day  senna 2 Tablet(s) Oral at bedtime  sodium bicarbonate 650 milliGRAM(s) Oral every 6 hours  sucralfate suspension 1 Gram(s) Oral four times a day  tamsulosin 0.4 milliGRAM(s) Oral at bedtime  thiamine 100 milliGRAM(s) Oral daily    MEDICATIONS  (PRN):  acetaminophen     Tablet .. 650 milliGRAM(s) Oral every 6 hours PRN Temp greater or equal to 38C (100.4F), Mild Pain (1 - 3)  aluminum hydroxide/magnesium hydroxide/simethicone Suspension 30 milliLiter(s) Oral every 4 hours PRN Dyspepsia  melatonin 3 milliGRAM(s) Oral at bedtime PRN Insomnia  metoclopramide Injectable 5 milliGRAM(s) IV Push every 8 hours PRN nausea/vomiting  ondansetron Injectable 4 milliGRAM(s) IV Push every 8 hours PRN Nausea and/or Vomiting  oxycodone    5 mG/acetaminophen 325 mG 1 Tablet(s) Oral every 6 hours PRN Moderate Pain (4 - 6)  oxycodone    5 mG/acetaminophen 325 mG 2 Tablet(s) Oral every 6 hours PRN Severe Pain (7 - 10)      Allergies    No Known Allergies    Intolerances        Review of Systems:    General:  No wt loss, fevers, chills, night sweats, fatigue   Eyes:  Good vision, no reported pain  ENT:  No sore throat, pain, runny nose, dysphagia  CV:  No pain, palpitations, hypo/hypertension  Resp:  No dyspnea, cough, tachypnea, wheezing  GI:  No pain, No nausea, No vomiting, No diarrhea, No constipation, No weight loss, No fever, No pruritis, No rectal bleeding, No melena, No dysphagia  :  No pain, bleeding, incontinence, nocturia  Muscle:  No pain, weakness  Neuro:  No weakness, tingling, memory problems  Psych:  No fatigue, insomnia, mood problems, depression  Endocrine:  No polyuria, polydypsia, cold/heat intolerance  Heme:  No petechiae, ecchymosis, easy bruisability  Skin:  No rash, tattoos, scars, edema      Vital Signs Last 24 Hrs  T(C): 37.1 (01 Feb 2022 05:37), Max: 37.1 (01 Feb 2022 05:37)  T(F): 98.8 (01 Feb 2022 05:37), Max: 98.8 (01 Feb 2022 05:37)  HR: 84 (01 Feb 2022 05:37) (83 - 96)  BP: 137/68 (01 Feb 2022 05:37) (137/68 - 156/78)  BP(mean): --  RR: 16 (01 Feb 2022 05:37) (16 - 19)  SpO2: 100% (01 Feb 2022 05:37) (100% - 100%)    PHYSICAL EXAM:    Constitutional: NAD  HEENT: EOMI, throat clear  Neck: No LAD, supple  Respiratory: CTA and P  Cardiovascular: S1 and S2, RRR, no M  Gastrointestinal: BS+, soft, NT/ND, neg HSM,  Extremities: No peripheral edema, neg clubbing, cyanosis  Vascular: 2+ peripheral pulses  Neurological: A/O x 3, no focal deficits  Psychiatric: Normal mood, normal affect  Skin: No rashes      LABS:                        7.3    6.32  )-----------( 252      ( 01 Feb 2022 06:29 )             25.4     02-01    144  |  110<H>  |  23  ----------------------------<  91  4.0   |  27  |  3.14<H>    Ca    8.3<L>      01 Feb 2022 06:29  Phos  2.4     02-01  Mg     2.20     02-01    TPro  5.7<L>  /  Alb  3.1<L>  /  TBili  <0.2  /  DBili  x   /  AST  10  /  ALT  6   /  AlkPhos  75  01-31          RADIOLOGY & ADDITIONAL TESTS:  
[facility] [place] [facility]   MD MICHELLE FOSTER MD, PA INJUNG KO NP    TEL:  OFFICE: [phone]  DR ORTIZ CELL: [phone]  DR. MASON CELL: [phone]  SHOAIB HUNTER CELL: [phone]  MARIA ESTHER SNEED CELL :[phone]    From 5pm-7am Answering Service [phone]    -- RENAL FOLLOW UP NOTE ---Date of Service 02-02-22 @ 13:29    Patient is a 61y old  Male who presents with a chief complaint of abdominal pain (02 Feb 2022 10:50)    Patient seen and examined at bedside. No chest pain/sob    VITALS:  T(F): 97.7 (02-02-22 @ 05:22), Max: 98.1 (02-01-22 @ 14:33)  HR: 72 (02-02-22 @ 05:22)  BP: 147/81 (02-02-22 @ 05:22)  RR: 17 (02-02-22 @ 05:22)  SpO2: 100% (02-02-22 @ 05:22)  Wt(kg): --        PHYSICAL EXAM:  Constitutional: NAD  Neck: No JVD  Respiratory: CTAB, no wheezes, rales or rhonchi  Cardiovascular: S1, S2, RRR  Gastrointestinal: BS+, soft, NT/ND  Extremities: No peripheral edema    Hospital Medications:   MEDICATIONS  (STANDING):  cyanocobalamin 1000 MICROGram(s) Oral daily  famotidine    Tablet 20 milliGRAM(s) Oral at bedtime  folic acid 1 milliGRAM(s) Oral daily  gabapentin 100 milliGRAM(s) Oral every 12 hours  heparin   Injectable 5000 Unit(s) SubCutaneous every 12 hours  influenza   Vaccine 0.5 milliLiter(s) IntraMuscular once  lactated ringers. 1000 milliLiter(s) (75 mL/Hr) IV Continuous <Continuous>  multivitamin 1 Tablet(s) Oral daily  pancrelipase  (CREON  6,000 Lipase Units) 2 Capsule(s) Oral three times a day with meals  pantoprazole  Injectable 40 milliGRAM(s) IV Push two times a day  senna 2 Tablet(s) Oral at bedtime  sodium bicarbonate 650 milliGRAM(s) Oral every 6 hours  sucralfate suspension 1 Gram(s) Oral four times a day  tamsulosin 0.4 milliGRAM(s) Oral at bedtime  thiamine 100 milliGRAM(s) Oral daily      LABS:  02-02    144  |  110<H>  |  23  ----------------------------<  93  4.3   |  26  |  3.14<H>    Ca    8.2<L>      02 Feb 2022 06:27  Phos  2.8     02-02  Mg     2.20     02-02      Creatinine Trend: 3.14 <--, 3.14 <--, 3.09 <--, 3.19 <--, 3.23 <--, 3.57 <--, 3.65 <--    Phosphorus Level, Serum: 2.8 mg/dL (02-02 @ 06:27)                              7.4    5.20  )-----------( 241      ( 02 Feb 2022 06:27 )             25.7     Urine Studies:      Iron 15, TIBC 318, %sat 5      [12-22-21 @ 09:25]  Ferritin 15      [12-22-21 @ 09:27]  HbA1c 5.0      [03-22-19 @ 09:03]        RADIOLOGY & ADDITIONAL STUDIES:  
[facility]  Avila Patel PA-C  [facility] [place] [address] [phone]      INTERVAL HPI/OVERNIGHT EVENTS:    tolerating diet    MEDICATIONS  (STANDING):  calcium acetate 667 milliGRAM(s) Oral three times a day with meals  cyanocobalamin 1000 MICROGram(s) Oral daily  famotidine    Tablet 20 milliGRAM(s) Oral at bedtime  folic acid 1 milliGRAM(s) Oral daily  gabapentin 100 milliGRAM(s) Oral every 12 hours  heparin   Injectable 5000 Unit(s) SubCutaneous every 12 hours  influenza   Vaccine 0.5 milliLiter(s) IntraMuscular once  lactated ringers. 1000 milliLiter(s) (75 mL/Hr) IV Continuous <Continuous>  multivitamin 1 Tablet(s) Oral daily  pancrelipase  (CREON  6,000 Lipase Units) 2 Capsule(s) Oral three times a day with meals  pantoprazole  Injectable 40 milliGRAM(s) IV Push two times a day  senna 2 Tablet(s) Oral at bedtime  sodium bicarbonate 650 milliGRAM(s) Oral every 6 hours  sucralfate suspension 1 Gram(s) Oral four times a day  tamsulosin 0.4 milliGRAM(s) Oral at bedtime  thiamine 100 milliGRAM(s) Oral daily    MEDICATIONS  (PRN):  acetaminophen     Tablet .. 650 milliGRAM(s) Oral every 6 hours PRN Temp greater or equal to 38C (100.4F), Mild Pain (1 - 3)  aluminum hydroxide/magnesium hydroxide/simethicone Suspension 30 milliLiter(s) Oral every 4 hours PRN Dyspepsia  melatonin 3 milliGRAM(s) Oral at bedtime PRN Insomnia  metoclopramide Injectable 5 milliGRAM(s) IV Push every 8 hours PRN nausea/vomiting  ondansetron Injectable 4 milliGRAM(s) IV Push every 8 hours PRN Nausea and/or Vomiting  oxycodone    5 mG/acetaminophen 325 mG 1 Tablet(s) Oral every 6 hours PRN Moderate Pain (4 - 6)  oxycodone    5 mG/acetaminophen 325 mG 2 Tablet(s) Oral every 6 hours PRN Severe Pain (7 - 10)      Allergies    No Known Allergies    Intolerances        ROS:   General:  No wt loss, fevers, chills, night sweats, fatigue,   Eyes:  Good vision, no reported pain  ENT:  No sore throat, pain, runny nose, dysphagia  CV:  No pain, palpitations, hypo/hypertension  Resp:  No dyspnea, cough, tachypnea, wheezing  GI:  No pain, No nausea, No vomiting, No diarrhea, No constipation, No weight loss, No fever, No pruritis, No rectal bleeding, No tarry stools, No dysphagia,  :  No pain, bleeding, incontinence, nocturia  Muscle:  No pain, weakness  Neuro:  No weakness, tingling, memory problems  Psych:  No fatigue, insomnia, mood problems, depression  Endocrine:  No polyuria, polydipsia, cold/heat intolerance  Heme:  No petechiae, ecchymosis, easy bruisability  Skin:  No rash, tattoos, scars, edema      PHYSICAL EXAM:   Vital Signs:  Vital Signs Last 24 Hrs  T(C): 36.6 (29 Jan 2022 14:23), Max: 36.7 (28 Jan 2022 23:55)  T(F): 97.8 (29 Jan 2022 14:23), Max: 98.1 (28 Jan 2022 23:55)  HR: 73 (29 Jan 2022 14:23) (71 - 83)  BP: 117/71 (29 Jan 2022 14:23) (114/60 - 140/69)  BP(mean): 87 (28 Jan 2022 18:00) (87 - 87)  RR: 17 (29 Jan 2022 14:23) (16 - 19)  SpO2: 99% (29 Jan 2022 14:23) (98% - 100%)  Daily     Daily     GENERAL:  Appears stated age,   HEENT:  NC/AT,    CHEST:  Full & symmetric excursion,   HEART:  Regular rhythm,  ABDOMEN:  Soft, non-tender, non-distended,  EXTEREMITIES:  no cyanosis  SKIN:  No rash  NEURO:  Alert,       LABS:                        7.6    6.28  )-----------( 305      ( 29 Jan 2022 06:12 )             25.8     01-29    141  |  106  |  30<H>  ----------------------------<  96  3.8   |  29  |  3.23<H>    Ca    7.8<L>      29 Jan 2022 06:12  Phos  3.8     01-29  Mg     3.00     01-29    TPro  5.6<L>  /  Alb  3.2<L>  /  TBili  <0.2  /  DBili  <0.2  /  AST  13  /  ALT  6   /  AlkPhos  80  01-29    PT/INR - ( 28 Jan 2022 06:54 )   PT: 11.4 sec;   INR: 0.99 ratio               RADIOLOGY & ADDITIONAL TESTS:  
Patient is a 61y old  Male who presents with a chief complaint of abdominal pain (29 Jan 2022 16:55)      SUBJECTIVE / OVERNIGHT EVENTS: no events over night     Events noted.  CONSTITUTIONAL: No fever,  or fatigue  RESPIRATORY: No cough, wheezing,  No shortness of breath  CARDIOVASCULAR: No chest pain, palpitations, dizziness, or leg swelling  GASTROINTESTINAL: Abdominal discomfort    MEDICATIONS  (STANDING):  calcium acetate 667 milliGRAM(s) Oral three times a day with meals  cyanocobalamin 1000 MICROGram(s) Oral daily  famotidine    Tablet 20 milliGRAM(s) Oral at bedtime  folic acid 1 milliGRAM(s) Oral daily  gabapentin 100 milliGRAM(s) Oral every 12 hours  heparin   Injectable 5000 Unit(s) SubCutaneous every 12 hours  influenza   Vaccine 0.5 milliLiter(s) IntraMuscular once  lactated ringers. 1000 milliLiter(s) (75 mL/Hr) IV Continuous <Continuous>  multivitamin 1 Tablet(s) Oral daily  pancrelipase  (CREON  6,000 Lipase Units) 2 Capsule(s) Oral three times a day with meals  pantoprazole  Injectable 40 milliGRAM(s) IV Push two times a day  senna 2 Tablet(s) Oral at bedtime  sodium bicarbonate 650 milliGRAM(s) Oral every 6 hours  sucralfate suspension 1 Gram(s) Oral four times a day  tamsulosin 0.4 milliGRAM(s) Oral at bedtime  thiamine 100 milliGRAM(s) Oral daily    MEDICATIONS  (PRN):  acetaminophen     Tablet .. 650 milliGRAM(s) Oral every 6 hours PRN Temp greater or equal to 38C (100.4F), Mild Pain (1 - 3)  aluminum hydroxide/magnesium hydroxide/simethicone Suspension 30 milliLiter(s) Oral every 4 hours PRN Dyspepsia  melatonin 3 milliGRAM(s) Oral at bedtime PRN Insomnia  metoclopramide Injectable 5 milliGRAM(s) IV Push every 8 hours PRN nausea/vomiting  ondansetron Injectable 4 milliGRAM(s) IV Push every 8 hours PRN Nausea and/or Vomiting  oxycodone    5 mG/acetaminophen 325 mG 1 Tablet(s) Oral every 6 hours PRN Moderate Pain (4 - 6)  oxycodone    5 mG/acetaminophen 325 mG 2 Tablet(s) Oral every 6 hours PRN Severe Pain (7 - 10)        PHYSICAL EXAM:  GENERAL: NAD  NECK: Supple, No JVD  CHEST/LUNG: Clear to auscultation bilaterally; No wheezing.  HEART: Regular rate and rhythm; No murmurs, rubs, or gallops  ABDOMEN: Soft, Nontender, Nondistended; Bowel sounds present  EXTREMITIES:   No edema  NEUROLOGY: AAO       LABS:                        7.6    6.28  )-----------( 305      ( 29 Jan 2022 06:12 )             25.8     01-29    141  |  106  |  30<H>  ----------------------------<  96  3.8   |  29  |  3.23<H>    Ca    7.8<L>      29 Jan 2022 06:12  Phos  3.8     01-29  Mg     3.00     01-29    TPro  5.6<L>  /  Alb  3.2<L>  /  TBili  <0.2  /  DBili  <0.2  /  AST  13  /  ALT  6   /  AlkPhos  80  01-29    PT/INR - ( 28 Jan 2022 06:54 )   PT: 11.4 sec;   INR: 0.99 ratio                 CAPILLARY BLOOD GLUCOSE            RADIOLOGY & ADDITIONAL TESTS:    Imaging Personally Reviewed:    Consultant(s) Notes Reviewed:      Care Discussed with Consultants/Other Providers:    Dariel Hardy MD, CMD, FACP    [address] [address]  Office Tel: [phone]  Cell: [phone]  
Patient is a 61y old  Male who presents with a chief complaint of abdominal pain (27 Jan 2022 12:30)      SUBJECTIVE / OVERNIGHT EVENTS: no events over night     MEDICATIONS  (STANDING):  calcium acetate 667 milliGRAM(s) Oral three times a day with meals  cyanocobalamin 1000 MICROGram(s) Oral daily  famotidine    Tablet 20 milliGRAM(s) Oral at bedtime  folic acid 1 milliGRAM(s) Oral daily  gabapentin 100 milliGRAM(s) Oral every 12 hours  heparin   Injectable 5000 Unit(s) SubCutaneous every 12 hours  influenza   Vaccine 0.5 milliLiter(s) IntraMuscular once  lactated ringers. 1000 milliLiter(s) (75 mL/Hr) IV Continuous <Continuous>  multivitamin 1 Tablet(s) Oral daily  pancrelipase  (CREON  6,000 Lipase Units) 2 Capsule(s) Oral three times a day with meals  pantoprazole  Injectable 40 milliGRAM(s) IV Push two times a day  senna 2 Tablet(s) Oral at bedtime  sodium bicarbonate 650 milliGRAM(s) Oral every 6 hours  sucralfate 1 Gram(s) Oral four times a day  tamsulosin 0.4 milliGRAM(s) Oral at bedtime  thiamine 100 milliGRAM(s) Oral daily    MEDICATIONS  (PRN):  acetaminophen     Tablet .. 650 milliGRAM(s) Oral every 6 hours PRN Temp greater or equal to 38C (100.4F), Mild Pain (1 - 3)  aluminum hydroxide/magnesium hydroxide/simethicone Suspension 30 milliLiter(s) Oral every 4 hours PRN Dyspepsia  melatonin 3 milliGRAM(s) Oral at bedtime PRN Insomnia  metoclopramide Injectable 5 milliGRAM(s) IV Push every 8 hours PRN nausea/vomiting  ondansetron Injectable 4 milliGRAM(s) IV Push every 8 hours PRN Nausea and/or Vomiting  oxycodone    5 mG/acetaminophen 325 mG 1 Tablet(s) Oral every 6 hours PRN Moderate Pain (4 - 6)      CAPILLARY BLOOD GLUCOSE        I&O's Summary    T(C): 36.7 (01-27-22 @ 15:45), Max: 37.1 (01-27-22 @ 09:32)  HR: 85 (01-27-22 @ 15:45) (85 - 90)  BP: 151/70 (01-27-22 @ 15:45) (133/76 - 151/70)  RR: 18 (01-27-22 @ 15:45) (18 - 18)  SpO2: 100% (01-27-22 @ 15:45) (100% - 100%)    PHYSICAL EXAM:  GENERAL: NAD, well-developed  HEAD:  Atraumatic, Normocephalic  EYES: EOMI, PERRLA, conjunctiva and sclera clear  NECK: Supple, No JVD  CHEST/LUNG: Clear to auscultation bilaterally; No wheeze  HEART: Regular rate and rhythm; No murmurs, rubs, or gallops  ABDOMEN: Soft, Nontender, Nondistended; Bowel sounds present  EXTREMITIES:  2+ Peripheral Pulses, No clubbing, cyanosis, or edema  PSYCH: AAOx3  NEUROLOGY: non-focal  SKIN: No rashes or lesions    LABS:                        8.6    7.32  )-----------( 374      ( 27 Jan 2022 13:11 )             29.6     01-27    140  |  102  |  40<H>  ----------------------------<  107<H>  4.1   |  33<H>  |  3.65<H>    Ca    8.1<L>      27 Jan 2022 13:09  Phos  3.8     01-27  Mg     3.70     01-27    TPro  6.4  /  Alb  2.7<L>  /  TBili  0.2  /  DBili  x   /  AST  12<L>  /  ALT  16  /  AlkPhos  95  01-26              RADIOLOGY & ADDITIONAL TESTS:    Imaging Personally Reviewed:    Consultant(s) Notes Reviewed:      Care Discussed with Consultants/Other Providers:  
Patient is a 61y old  Male who presents with a chief complaint of abdominal pain (29 Jan 2022 16:55)      SUBJECTIVE / OVERNIGHT EVENTS:    Events noted.  CONSTITUTIONAL: No fever,  or fatigue  RESPIRATORY: No cough, wheezing,  No shortness of breath  CARDIOVASCULAR: No chest pain, palpitations, dizziness, or leg swelling  GASTROINTESTINAL: Abdominal discomfort    MEDICATIONS  (STANDING):  calcium acetate 667 milliGRAM(s) Oral three times a day with meals  cyanocobalamin 1000 MICROGram(s) Oral daily  famotidine    Tablet 20 milliGRAM(s) Oral at bedtime  folic acid 1 milliGRAM(s) Oral daily  gabapentin 100 milliGRAM(s) Oral every 12 hours  heparin   Injectable 5000 Unit(s) SubCutaneous every 12 hours  influenza   Vaccine 0.5 milliLiter(s) IntraMuscular once  lactated ringers. 1000 milliLiter(s) (75 mL/Hr) IV Continuous <Continuous>  multivitamin 1 Tablet(s) Oral daily  pancrelipase  (CREON  6,000 Lipase Units) 2 Capsule(s) Oral three times a day with meals  pantoprazole  Injectable 40 milliGRAM(s) IV Push two times a day  senna 2 Tablet(s) Oral at bedtime  sodium bicarbonate 650 milliGRAM(s) Oral every 6 hours  sucralfate suspension 1 Gram(s) Oral four times a day  tamsulosin 0.4 milliGRAM(s) Oral at bedtime  thiamine 100 milliGRAM(s) Oral daily    MEDICATIONS  (PRN):  acetaminophen     Tablet .. 650 milliGRAM(s) Oral every 6 hours PRN Temp greater or equal to 38C (100.4F), Mild Pain (1 - 3)  aluminum hydroxide/magnesium hydroxide/simethicone Suspension 30 milliLiter(s) Oral every 4 hours PRN Dyspepsia  melatonin 3 milliGRAM(s) Oral at bedtime PRN Insomnia  metoclopramide Injectable 5 milliGRAM(s) IV Push every 8 hours PRN nausea/vomiting  ondansetron Injectable 4 milliGRAM(s) IV Push every 8 hours PRN Nausea and/or Vomiting  oxycodone    5 mG/acetaminophen 325 mG 1 Tablet(s) Oral every 6 hours PRN Moderate Pain (4 - 6)  oxycodone    5 mG/acetaminophen 325 mG 2 Tablet(s) Oral every 6 hours PRN Severe Pain (7 - 10)        CAPILLARY BLOOD GLUCOSE        I&O's Summary    28 Jan 2022 07:01  -  29 Jan 2022 07:00  --------------------------------------------------------  IN: 910 mL / OUT: 120 mL / NET: 790 mL        T(C): 36.7 (01-29-22 @ 21:55), Max: 36.7 (01-29-22 @ 21:55)  HR: 74 (01-29-22 @ 21:55) (71 - 74)  BP: 145/70 (01-29-22 @ 21:55) (117/61 - 145/70)  RR: 18 (01-29-22 @ 21:55) (17 - 18)  SpO2: 100% (01-29-22 @ 21:55) (98% - 100%)    PHYSICAL EXAM:  GENERAL: NAD  NECK: Supple, No JVD  CHEST/LUNG: Clear to auscultation bilaterally; No wheezing.  HEART: Regular rate and rhythm; No murmurs, rubs, or gallops  ABDOMEN: Soft, Nontender, Nondistended; Bowel sounds present  EXTREMITIES:   No edema  NEUROLOGY: AAO       LABS:                        7.6    6.28  )-----------( 305      ( 29 Jan 2022 06:12 )             25.8     01-29    141  |  106  |  30<H>  ----------------------------<  96  3.8   |  29  |  3.23<H>    Ca    7.8<L>      29 Jan 2022 06:12  Phos  3.8     01-29  Mg     3.00     01-29    TPro  5.6<L>  /  Alb  3.2<L>  /  TBili  <0.2  /  DBili  <0.2  /  AST  13  /  ALT  6   /  AlkPhos  80  01-29    PT/INR - ( 28 Jan 2022 06:54 )   PT: 11.4 sec;   INR: 0.99 ratio                 CAPILLARY BLOOD GLUCOSE            RADIOLOGY & ADDITIONAL TESTS:    Imaging Personally Reviewed:    Consultant(s) Notes Reviewed:      Care Discussed with Consultants/Other Providers:    Dariel Hardy MD, CMD, FACP    [address] [place] [address]  Office Tel: [phone]  Cell: [phone]  
Patient is a 61y old  Male who presents with a chief complaint of abdominal pain (29 Jan 2022 16:55)      SUBJECTIVE / OVERNIGHT EVENTS: no events over night     Events noted.  CONSTITUTIONAL: No fever,  or fatigue  RESPIRATORY: No cough, wheezing,  No shortness of breath  CARDIOVASCULAR: No chest pain, palpitations, dizziness, or leg swelling  GASTROINTESTINAL: Abdominal discomfort    MEDICATIONS  (STANDING):  calcium acetate 667 milliGRAM(s) Oral three times a day with meals  cyanocobalamin 1000 MICROGram(s) Oral daily  famotidine    Tablet 20 milliGRAM(s) Oral at bedtime  folic acid 1 milliGRAM(s) Oral daily  gabapentin 100 milliGRAM(s) Oral every 12 hours  heparin   Injectable 5000 Unit(s) SubCutaneous every 12 hours  influenza   Vaccine 0.5 milliLiter(s) IntraMuscular once  lactated ringers. 1000 milliLiter(s) (75 mL/Hr) IV Continuous <Continuous>  multivitamin 1 Tablet(s) Oral daily  pancrelipase  (CREON  6,000 Lipase Units) 2 Capsule(s) Oral three times a day with meals  pantoprazole  Injectable 40 milliGRAM(s) IV Push two times a day  senna 2 Tablet(s) Oral at bedtime  sodium bicarbonate 650 milliGRAM(s) Oral every 6 hours  sucralfate suspension 1 Gram(s) Oral four times a day  tamsulosin 0.4 milliGRAM(s) Oral at bedtime  thiamine 100 milliGRAM(s) Oral daily    MEDICATIONS  (PRN):  acetaminophen     Tablet .. 650 milliGRAM(s) Oral every 6 hours PRN Temp greater or equal to 38C (100.4F), Mild Pain (1 - 3)  aluminum hydroxide/magnesium hydroxide/simethicone Suspension 30 milliLiter(s) Oral every 4 hours PRN Dyspepsia  melatonin 3 milliGRAM(s) Oral at bedtime PRN Insomnia  metoclopramide Injectable 5 milliGRAM(s) IV Push every 8 hours PRN nausea/vomiting  ondansetron Injectable 4 milliGRAM(s) IV Push every 8 hours PRN Nausea and/or Vomiting  oxycodone    5 mG/acetaminophen 325 mG 1 Tablet(s) Oral every 6 hours PRN Moderate Pain (4 - 6)  oxycodone    5 mG/acetaminophen 325 mG 2 Tablet(s) Oral every 6 hours PRN Severe Pain (7 - 10)        PHYSICAL EXAM:  GENERAL: NAD  NECK: Supple, No JVD  CHEST/LUNG: Clear to auscultation bilaterally; No wheezing.  HEART: Regular rate and rhythm; No murmurs, rubs, or gallops  ABDOMEN: Soft, Nontender, Nondistended; Bowel sounds present  EXTREMITIES:   No edema  NEUROLOGY: AAO       LABS:                        7.6    6.28  )-----------( 305      ( 29 Jan 2022 06:12 )             25.8     01-29    141  |  106  |  30<H>  ----------------------------<  96  3.8   |  29  |  3.23<H>    Ca    7.8<L>      29 Jan 2022 06:12  Phos  3.8     01-29  Mg     3.00     01-29    TPro  5.6<L>  /  Alb  3.2<L>  /  TBili  <0.2  /  DBili  <0.2  /  AST  13  /  ALT  6   /  AlkPhos  80  01-29    PT/INR - ( 28 Jan 2022 06:54 )   PT: 11.4 sec;   INR: 0.99 ratio                 CAPILLARY BLOOD GLUCOSE            RADIOLOGY & ADDITIONAL TESTS:    Imaging Personally Reviewed:    Consultant(s) Notes Reviewed:      Care Discussed with Consultants/Other Providers:    Dariel Hardy MD, CMD, FACP    [address] [place] [address]  Office Tel: [phone]  Cell: [phone]  
Patient is a 61y old  Male who presents with a chief complaint of abdominal pain (27 Jan 2022 12:30)      SUBJECTIVE / OVERNIGHT EVENTS: no events over night     MEDICATIONS  (STANDING):  calcium acetate 667 milliGRAM(s) Oral three times a day with meals  cyanocobalamin 1000 MICROGram(s) Oral daily  famotidine    Tablet 20 milliGRAM(s) Oral at bedtime  folic acid 1 milliGRAM(s) Oral daily  gabapentin 100 milliGRAM(s) Oral every 12 hours  heparin   Injectable 5000 Unit(s) SubCutaneous every 12 hours  influenza   Vaccine 0.5 milliLiter(s) IntraMuscular once  lactated ringers. 1000 milliLiter(s) (75 mL/Hr) IV Continuous <Continuous>  multivitamin 1 Tablet(s) Oral daily  pancrelipase  (CREON  6,000 Lipase Units) 2 Capsule(s) Oral three times a day with meals  pantoprazole  Injectable 40 milliGRAM(s) IV Push two times a day  senna 2 Tablet(s) Oral at bedtime  sodium bicarbonate 650 milliGRAM(s) Oral every 6 hours  sucralfate 1 Gram(s) Oral four times a day  tamsulosin 0.4 milliGRAM(s) Oral at bedtime  thiamine 100 milliGRAM(s) Oral daily    MEDICATIONS  (PRN):  acetaminophen     Tablet .. 650 milliGRAM(s) Oral every 6 hours PRN Temp greater or equal to 38C (100.4F), Mild Pain (1 - 3)  aluminum hydroxide/magnesium hydroxide/simethicone Suspension 30 milliLiter(s) Oral every 4 hours PRN Dyspepsia  melatonin 3 milliGRAM(s) Oral at bedtime PRN Insomnia  metoclopramide Injectable 5 milliGRAM(s) IV Push every 8 hours PRN nausea/vomiting  ondansetron Injectable 4 milliGRAM(s) IV Push every 8 hours PRN Nausea and/or Vomiting  oxycodone    5 mG/acetaminophen 325 mG 1 Tablet(s) Oral every 6 hours PRN Moderate Pain (4 - 6)      CAPILLARY BLOOD GLUCOSE        I&O's Summary    T(C): 36.7 (01-27-22 @ 15:45), Max: 37.1 (01-27-22 @ 09:32)  HR: 85 (01-27-22 @ 15:45) (85 - 90)  BP: 151/70 (01-27-22 @ 15:45) (133/76 - 151/70)  RR: 18 (01-27-22 @ 15:45) (18 - 18)  SpO2: 100% (01-27-22 @ 15:45) (100% - 100%)    PHYSICAL EXAM:  GENERAL: NAD, well-developed  HEAD:  Atraumatic, Normocephalic  EYES: EOMI, PERRLA, conjunctiva and sclera clear  NECK: Supple, No JVD  CHEST/LUNG: Clear to auscultation bilaterally; No wheeze  HEART: Regular rate and rhythm; No murmurs, rubs, or gallops  ABDOMEN: Soft, Nontender, Nondistended; Bowel sounds present  EXTREMITIES:  2+ Peripheral Pulses, No clubbing, cyanosis, or edema  PSYCH: AAOx3  NEUROLOGY: non-focal  SKIN: No rashes or lesions    LABS:                        8.6    7.32  )-----------( 374      ( 27 Jan 2022 13:11 )             29.6     01-27    140  |  102  |  40<H>  ----------------------------<  107<H>  4.1   |  33<H>  |  3.65<H>    Ca    8.1<L>      27 Jan 2022 13:09  Phos  3.8     01-27  Mg     3.70     01-27    TPro  6.4  /  Alb  2.7<L>  /  TBili  0.2  /  DBili  x   /  AST  12<L>  /  ALT  16  /  AlkPhos  95  01-26              RADIOLOGY & ADDITIONAL TESTS:    Imaging Personally Reviewed:    Consultant(s) Notes Reviewed:      Care Discussed with Consultants/Other Providers:  
Patient is a 61y old  Male who presents with a chief complaint of abdominal pain (29 Jan 2022 16:55)      SUBJECTIVE / OVERNIGHT EVENTS:    Events noted.  CONSTITUTIONAL: No fever,  or fatigue  RESPIRATORY: No cough, wheezing,  No shortness of breath  CARDIOVASCULAR: No chest pain, palpitations, dizziness, or leg swelling  GASTROINTESTINAL: Abdominal discomfort    MEDICATIONS  (STANDING):  calcium acetate 667 milliGRAM(s) Oral three times a day with meals  cyanocobalamin 1000 MICROGram(s) Oral daily  famotidine    Tablet 20 milliGRAM(s) Oral at bedtime  folic acid 1 milliGRAM(s) Oral daily  gabapentin 100 milliGRAM(s) Oral every 12 hours  heparin   Injectable 5000 Unit(s) SubCutaneous every 12 hours  influenza   Vaccine 0.5 milliLiter(s) IntraMuscular once  lactated ringers. 1000 milliLiter(s) (75 mL/Hr) IV Continuous <Continuous>  multivitamin 1 Tablet(s) Oral daily  pancrelipase  (CREON  6,000 Lipase Units) 2 Capsule(s) Oral three times a day with meals  pantoprazole  Injectable 40 milliGRAM(s) IV Push two times a day  senna 2 Tablet(s) Oral at bedtime  sodium bicarbonate 650 milliGRAM(s) Oral every 6 hours  sucralfate suspension 1 Gram(s) Oral four times a day  tamsulosin 0.4 milliGRAM(s) Oral at bedtime  thiamine 100 milliGRAM(s) Oral daily    MEDICATIONS  (PRN):  acetaminophen     Tablet .. 650 milliGRAM(s) Oral every 6 hours PRN Temp greater or equal to 38C (100.4F), Mild Pain (1 - 3)  aluminum hydroxide/magnesium hydroxide/simethicone Suspension 30 milliLiter(s) Oral every 4 hours PRN Dyspepsia  melatonin 3 milliGRAM(s) Oral at bedtime PRN Insomnia  metoclopramide Injectable 5 milliGRAM(s) IV Push every 8 hours PRN nausea/vomiting  ondansetron Injectable 4 milliGRAM(s) IV Push every 8 hours PRN Nausea and/or Vomiting  oxycodone    5 mG/acetaminophen 325 mG 1 Tablet(s) Oral every 6 hours PRN Moderate Pain (4 - 6)  oxycodone    5 mG/acetaminophen 325 mG 2 Tablet(s) Oral every 6 hours PRN Severe Pain (7 - 10)        CAPILLARY BLOOD GLUCOSE        I&O's Summary    28 Jan 2022 07:01  -  29 Jan 2022 07:00  --------------------------------------------------------  IN: 910 mL / OUT: 120 mL / NET: 790 mL        T(C): 36.7 (01-29-22 @ 21:55), Max: 36.7 (01-29-22 @ 21:55)  HR: 74 (01-29-22 @ 21:55) (71 - 74)  BP: 145/70 (01-29-22 @ 21:55) (117/61 - 145/70)  RR: 18 (01-29-22 @ 21:55) (17 - 18)  SpO2: 100% (01-29-22 @ 21:55) (98% - 100%)    PHYSICAL EXAM:  GENERAL: NAD  NECK: Supple, No JVD  CHEST/LUNG: Clear to auscultation bilaterally; No wheezing.  HEART: Regular rate and rhythm; No murmurs, rubs, or gallops  ABDOMEN: Soft, Nontender, Nondistended; Bowel sounds present  EXTREMITIES:   No edema  NEUROLOGY: AAO       LABS:                        7.6    6.28  )-----------( 305      ( 29 Jan 2022 06:12 )             25.8     01-29    141  |  106  |  30<H>  ----------------------------<  96  3.8   |  29  |  3.23<H>    Ca    7.8<L>      29 Jan 2022 06:12  Phos  3.8     01-29  Mg     3.00     01-29    TPro  5.6<L>  /  Alb  3.2<L>  /  TBili  <0.2  /  DBili  <0.2  /  AST  13  /  ALT  6   /  AlkPhos  80  01-29    PT/INR - ( 28 Jan 2022 06:54 )   PT: 11.4 sec;   INR: 0.99 ratio                 CAPILLARY BLOOD GLUCOSE            RADIOLOGY & ADDITIONAL TESTS:    Imaging Personally Reviewed:    Consultant(s) Notes Reviewed:      Care Discussed with Consultants/Other Providers:    Dariel Hardy MD, CMD, FACP    [address] [place] [address]  Office Tel: [phone]  Cell: [phone]

## 2022-02-02 NOTE — CHART NOTE - NSCHARTNOTEFT_GEN_A_CORE
Discharge held last night as pt concerned with pain control and concerns regarding follow up.     As discussed with Dr. Kline, sent percocet 2 tabs q 6 hrs prn x 5 days.   [facility] called, his insurance is accepted.   Appointment scheduled on     Layne Elizondo PA-C  Department of Medicine  Pager [phone] Discharge held last night as pt concerned with pain control and concerns regarding follow up.     As discussed with Dr. Kline, sent percocet 2 tabs q 6 hrs prn x 5 days.   [facility] called, his insurance is accepted.   Appointment scheduled on 2/16/2022 at 1:20 pm.   Discussed with patient who is agreeable.     Layne Elizondo PA-C  Department of Medicine  Pager [phone]

## 2022-02-02 NOTE — PROGRESS NOTE ADULT - PROVIDER SPECIALTY LIST ADULT
Gastroenterology
Gastroenterology
Nephrology
Gastroenterology
Internal Medicine
Gastroenterology
Nephrology
Gastroenterology
Internal Medicine
Internal Medicine
Hospitalist
Internal Medicine
Internal Medicine

## 2022-02-09 ENCOUNTER — RESULT REVIEW (OUTPATIENT)
Age: 62
End: 2022-02-09

## 2022-02-09 ENCOUNTER — APPOINTMENT (OUTPATIENT)
Dept: INTERNAL MEDICINE | Facility: CLINIC | Age: 62
End: 2022-02-09
Payer: MEDICAID

## 2022-02-09 ENCOUNTER — OUTPATIENT (OUTPATIENT)
Dept: OUTPATIENT SERVICES | Facility: HOSPITAL | Age: 62
LOS: 1 days | End: 2022-02-09

## 2022-02-09 ENCOUNTER — NON-APPOINTMENT (OUTPATIENT)
Age: 62
End: 2022-02-09

## 2022-02-09 VITALS
HEART RATE: 98 BPM | SYSTOLIC BLOOD PRESSURE: 130 MMHG | OXYGEN SATURATION: 98 % | DIASTOLIC BLOOD PRESSURE: 76 MMHG | WEIGHT: 136.25 LBS

## 2022-02-09 VITALS — HEIGHT: 73 IN | TEMPERATURE: 97.8 F | BODY MASS INDEX: 17.98 KG/M2

## 2022-02-09 DIAGNOSIS — Z00.00 ENCOUNTER FOR GENERAL ADULT MEDICAL EXAMINATION W/OUT ABNORMAL FINDINGS: ICD-10-CM

## 2022-02-09 DIAGNOSIS — K25.5 CHRONIC OR UNSPECIFIED GASTRIC ULCER WITH PERFORATION: Chronic | ICD-10-CM

## 2022-02-09 DIAGNOSIS — Z23 ENCOUNTER FOR IMMUNIZATION: ICD-10-CM

## 2022-02-09 LAB
A1C WITH ESTIMATED AVERAGE GLUCOSE RESULT: 5.5 % — SIGNIFICANT CHANGE UP (ref 4–5.6)
ALBUMIN SERPL ELPH-MCNC: 3.9 G/DL — SIGNIFICANT CHANGE UP (ref 3.3–5)
ALP SERPL-CCNC: 89 U/L — SIGNIFICANT CHANGE UP (ref 40–120)
ALT FLD-CCNC: 7 U/L — SIGNIFICANT CHANGE UP (ref 4–41)
AMPHET UR-MCNC: NEGATIVE — SIGNIFICANT CHANGE UP
ANION GAP SERPL CALC-SCNC: 10 MMOL/L — SIGNIFICANT CHANGE UP (ref 7–14)
AST SERPL-CCNC: 11 U/L — SIGNIFICANT CHANGE UP (ref 4–40)
BARBITURATES UR SCN-MCNC: NEGATIVE — SIGNIFICANT CHANGE UP
BENZODIAZ UR-MCNC: NEGATIVE — SIGNIFICANT CHANGE UP
BILIRUB SERPL-MCNC: <0.2 MG/DL — SIGNIFICANT CHANGE UP (ref 0.2–1.2)
BUN SERPL-MCNC: 33 MG/DL — HIGH (ref 7–23)
CALCIUM SERPL-MCNC: 8.8 MG/DL — SIGNIFICANT CHANGE UP (ref 8.4–10.5)
CHLORIDE SERPL-SCNC: 112 MMOL/L — HIGH (ref 98–107)
CHOLEST SERPL-MCNC: 159 MG/DL — SIGNIFICANT CHANGE UP
CO2 SERPL-SCNC: 17 MMOL/L — LOW (ref 22–31)
COCAINE METAB.OTHER UR-MCNC: NEGATIVE — SIGNIFICANT CHANGE UP
CREAT SERPL-MCNC: 3.37 MG/DL — HIGH (ref 0.5–1.3)
CREATININE URINE RESULT, DAU: 269 MG/DL — SIGNIFICANT CHANGE UP
ESTIMATED AVERAGE GLUCOSE: 111 — SIGNIFICANT CHANGE UP
GLUCOSE SERPL-MCNC: 96 MG/DL — SIGNIFICANT CHANGE UP (ref 70–99)
HDLC SERPL-MCNC: 49 MG/DL — SIGNIFICANT CHANGE UP
LIPID PNL WITH DIRECT LDL SERPL: 85 MG/DL — SIGNIFICANT CHANGE UP
METHADONE UR-MCNC: NEGATIVE — SIGNIFICANT CHANGE UP
NON HDL CHOLESTEROL: 110 MG/DL — SIGNIFICANT CHANGE UP
OPIATES UR-MCNC: NEGATIVE — SIGNIFICANT CHANGE UP
OXYCODONE UR-MCNC: POSITIVE
PCP SPEC-MCNC: SIGNIFICANT CHANGE UP
PCP UR-MCNC: NEGATIVE — SIGNIFICANT CHANGE UP
POTASSIUM SERPL-MCNC: 6.2 MMOL/L — CRITICAL HIGH (ref 3.5–5.3)
POTASSIUM SERPL-SCNC: 6.2 MMOL/L — CRITICAL HIGH (ref 3.5–5.3)
PROT SERPL-MCNC: 6.7 G/DL — SIGNIFICANT CHANGE UP (ref 6–8.3)
SODIUM SERPL-SCNC: 139 MMOL/L — SIGNIFICANT CHANGE UP (ref 135–145)
THC UR QL: NEGATIVE — SIGNIFICANT CHANGE UP
TRIGL SERPL-MCNC: 126 MG/DL — SIGNIFICANT CHANGE UP

## 2022-02-09 PROCEDURE — 99214 OFFICE O/P EST MOD 30 MIN: CPT

## 2022-02-09 PROCEDURE — 99204 OFFICE O/P NEW MOD 45 MIN: CPT

## 2022-02-09 RX ADMIN — NALOXONE HYDROCHLORIDE 0 MG/0.1ML: 4 SPRAY NASAL at 00:00

## 2022-02-10 LAB
HBV CORE AB SER-ACNC: REACTIVE
HBV SURFACE AG SER-ACNC: SIGNIFICANT CHANGE UP
HCV AB S/CO SERPL IA: 8.42 S/CO — HIGH (ref 0–0.99)
HCV AB SERPL-IMP: REACTIVE

## 2022-02-11 ENCOUNTER — NON-APPOINTMENT (OUTPATIENT)
Age: 62
End: 2022-02-11

## 2022-02-13 PROBLEM — Z00.00 ENCOUNTER FOR PREVENTIVE HEALTH EXAMINATION: Status: ACTIVE | Noted: 2022-02-03

## 2022-02-13 NOTE — REVIEW OF SYSTEMS
[Abdominal Pain] : abdominal pain [Nausea] : no nausea [Constipation] : no constipation [Diarrhea] : no diarrhea [Vomiting] : no vomiting [Negative] : Respiratory [FreeTextEntry7] : 10/10 abdominal pain

## 2022-02-13 NOTE — HISTORY OF PRESENT ILLNESS
[FreeTextEntry1] : Post hospital follow up/New patient visit [de-identified] : Patient is a 61 y.o M with PMHx of ETOH abuse (quit about 4-5 years ago), chronic pancreatitis, Hep C (past history of treatment), gout, gastric perf in 2019 (s/p repair) presenting to the office for post discharge follow up  \par \par Patient was admitted from 26 Jan 2022- 1 Feb 2022 for abdominal pain and n/v. Patient was seen by GI with endoscopy performed and found to have grade D esophagitis with 75% involvement of the esophageal circumference. No bleeding noted. Patient treated with PPI, Carafate, and Pepcid. Patient was told to follow up with GI and Surgery outpatient unfortunately the ones they provided did not accept Medicaid. \par \par Patient was also sent home with a one weeks supply of Percocet 5-325 mg 2 pills q6h prn. MME calculated to be about 60 MME which would put patient at increased risk for overdose. Patient was requesting more pain medication. PEG scale performed with patient rating 10 across all three domains stating the pain limits his mobility, ability to follow up with doctors, and ability to cook, clean, and shower. Discussed the addictive nature of Opioids and the fact that if he were to be on these medications long term it would be better suited under management of a pain specialist. Patient expressed understanding. No Opioid contract was avaiable at our site but it was made clear that if I were to provide this medication it would require close follow up in person, occassional drug screens to ensure its not being sold, and checking with the ISTOP system to ensure there is no dual prescribing. Patient expressed understanding. Discussed our goal of reducing pain 30% and the decrease of percocet to 1 tab q4h PRN which is around 40MMEs thus reducing risk. Patient expressed concern about reduced dose but was willing to trial it with us having close follow up.\par \par During discussion patient also noted issues with housing due to family drama and that he was in discussions with an agency to possibly find new housing. Given the possibility of unstable housing patient met with our SW to discuss transportation and possibly housing. \par \par Outside of pain patient had no acute complaints. Patient is not sexually active, smokes cigarettes, quit drinking alcohol 4-5 years ago, and has no illicit drug use.

## 2022-02-14 DIAGNOSIS — Z23 ENCOUNTER FOR IMMUNIZATION: ICD-10-CM

## 2022-02-14 DIAGNOSIS — F11.90 OPIOID USE, UNSPECIFIED, UNCOMPLICATED: ICD-10-CM

## 2022-02-14 DIAGNOSIS — K86.1 OTHER CHRONIC PANCREATITIS: ICD-10-CM

## 2022-02-14 DIAGNOSIS — K29.00 ACUTE GASTRITIS WITHOUT BLEEDING: ICD-10-CM

## 2022-02-14 DIAGNOSIS — Z00.00 ENCOUNTER FOR GENERAL ADULT MEDICAL EXAMINATION WITHOUT ABNORMAL FINDINGS: ICD-10-CM

## 2022-02-15 ENCOUNTER — NON-APPOINTMENT (OUTPATIENT)
Age: 62
End: 2022-02-15

## 2022-02-16 ENCOUNTER — OUTPATIENT (OUTPATIENT)
Dept: OUTPATIENT SERVICES | Facility: HOSPITAL | Age: 62
LOS: 1 days | End: 2022-02-16

## 2022-02-16 ENCOUNTER — APPOINTMENT (OUTPATIENT)
Dept: INTERNAL MEDICINE | Facility: CLINIC | Age: 62
End: 2022-02-16

## 2022-02-16 ENCOUNTER — APPOINTMENT (OUTPATIENT)
Dept: INTERNAL MEDICINE | Facility: CLINIC | Age: 62
End: 2022-02-16
Payer: MEDICAID

## 2022-02-16 VITALS
HEIGHT: 73 IN | WEIGHT: 134 LBS | OXYGEN SATURATION: 98 % | HEART RATE: 61 BPM | SYSTOLIC BLOOD PRESSURE: 130 MMHG | DIASTOLIC BLOOD PRESSURE: 80 MMHG | BODY MASS INDEX: 17.76 KG/M2

## 2022-02-16 VITALS — TEMPERATURE: 97.8 F

## 2022-02-16 DIAGNOSIS — K25.5 CHRONIC OR UNSPECIFIED GASTRIC ULCER WITH PERFORATION: Chronic | ICD-10-CM

## 2022-02-16 DIAGNOSIS — F10.99 ALCOHOL USE, UNSPECIFIED WITH UNSPECIFIED ALCOHOL-INDUCED DISORDER: ICD-10-CM

## 2022-02-16 DIAGNOSIS — Z78.9 OTHER SPECIFIED HEALTH STATUS: ICD-10-CM

## 2022-02-16 DIAGNOSIS — Z87.891 PERSONAL HISTORY OF NICOTINE DEPENDENCE: ICD-10-CM

## 2022-02-16 LAB — HCV RNA FLD QL NAA+PROBE: SIGNIFICANT CHANGE UP

## 2022-02-16 PROCEDURE — 99214 OFFICE O/P EST MOD 30 MIN: CPT

## 2022-02-23 DIAGNOSIS — B18.2 CHRONIC VIRAL HEPATITIS C: ICD-10-CM

## 2022-02-23 DIAGNOSIS — N18.4 CHRONIC KIDNEY DISEASE, STAGE 4 (SEVERE): ICD-10-CM

## 2022-02-23 DIAGNOSIS — F11.90 OPIOID USE, UNSPECIFIED, UNCOMPLICATED: ICD-10-CM

## 2022-02-23 PROBLEM — Z87.891 FORMER SMOKER: Status: ACTIVE | Noted: 2022-02-23

## 2022-02-23 PROBLEM — F10.99 ALCOHOL USE WITH ALCOHOL-INDUCED DISORDER: Status: RESOLVED | Noted: 2022-02-23 | Resolved: 2022-02-23

## 2022-02-23 PROBLEM — Z78.9 DENIES ALCOHOL CONSUMPTION: Status: ACTIVE | Noted: 2022-02-23

## 2022-02-23 NOTE — PHYSICAL EXAM
[Normal] : no respiratory distress, lungs were clear to auscultation bilaterally and no accessory muscle use [de-identified] : Appears more animated today compared to prior visit. Still Hiccuping during entire visit. Somewhat unkempt

## 2022-02-23 NOTE — REVIEW OF SYSTEMS
[Abdominal Pain] : abdominal pain [Negative] : Respiratory [FreeTextEntry7] : 10/10 epigastric abdominal pain with radiation to the back a/w hiccups

## 2022-02-23 NOTE — END OF VISIT
[] : A student assisted with documenting this visit. I have reviewed and verified all information documented by the student, and made modifications to such information, when appropriate. [Time Spent: ___ minutes] : I have spent [unfilled] minutes of time on the encounter. Statement Selected

## 2022-02-23 NOTE — HISTORY OF PRESENT ILLNESS
[FreeTextEntry1] : Follow up for pain control [de-identified] : Patient is a 61 y.o M with PMHx of ETOH abuse (quit about 4-5 years ago), chronic pancreatitis, Hep C (past history of treatment), gout, gastric perf in 2019 (s/p repair) presenting for pain control follow up. \par \par At last visit the patient was noted to be in 10/10 pain with a PEG scale score of 10 across all 3 fields. Patient was requesting Percocet at the time stating that he has been on the medications for years and even had a prolonged >1 year sub acute rehab stay where function was limited due to pain. Patient ISTOP reviewed at that time showing only two recent Percocet prescriptions coming form the ED. Patient most recently discharged at Percocet 2 tabs 4 times a day adding to about a 60MME dose. \par \par Discussed that given his PEG score continued pain medication was needed but that I wanted to attempt to decrease the dose to reduce risk of addiction. Patient was hesitant but expressed understanding at the time. Patient was started on percocet 1 tab q4h PRN regimen \par \par At todays visit patient noted that pain was still 10/10 stating the new medication dose was not enough. Noted that pain did decrease when taking the medication but that it would not last long enough for him. Patient has actually been taking the medication 8 times a day and not 6 essentially every 3 hours and was already about to run out of his medication early. Patient was unable to give me numbers despite repeated asking for PEG scale but admitted when pain med was in effect he would be able to write, call, and watch TV so function was improved somewhat at current dose. \par \par Patient was requesting an increase back to prior dose as he felt that worked better. I discussed that I would be more comfortable attempting a different regimen with possibly a longer acting medication to overcome this. Patient was agreeable. Will switch to Oxycontin ER 15mg q12hr and follow up in 3 weeks for pain control. Discussed with the patient that we will not be prescribing medications early if he takes them more often. Also discussed importance of finding pain management referral.\par \par During this visit we also discussed his labs. Patient with GFR nearing CKD stage 5 (Currently Stage 4). Because of this it is recommended he follows up with Nephrology to prepare for the possibility of dialysis. Also discussed his positive Hep C screening and that proper follow up with GI.Hepatology is indicated. Patient expressed understanding.

## 2022-03-01 ENCOUNTER — INPATIENT (INPATIENT)
Facility: HOSPITAL | Age: 62
LOS: 3 days | Discharge: HOME | End: 2022-03-05
Attending: GENERAL ACUTE CARE HOSPITAL | Admitting: GENERAL ACUTE CARE HOSPITAL
Payer: MEDICAID

## 2022-03-01 VITALS
RESPIRATION RATE: 20 BRPM | TEMPERATURE: 98 F | WEIGHT: 149.91 LBS | HEART RATE: 116 BPM | OXYGEN SATURATION: 100 % | HEIGHT: 73 IN | DIASTOLIC BLOOD PRESSURE: 90 MMHG | SYSTOLIC BLOOD PRESSURE: 149 MMHG

## 2022-03-01 DIAGNOSIS — K86.1 OTHER CHRONIC PANCREATITIS: ICD-10-CM

## 2022-03-01 DIAGNOSIS — K25.5 CHRONIC OR UNSPECIFIED GASTRIC ULCER WITH PERFORATION: Chronic | ICD-10-CM

## 2022-03-01 DIAGNOSIS — D72.829 ELEVATED WHITE BLOOD CELL COUNT, UNSPECIFIED: ICD-10-CM

## 2022-03-01 DIAGNOSIS — N40.0 BENIGN PROSTATIC HYPERPLASIA WITHOUT LOWER URINARY TRACT SYMPTOMS: ICD-10-CM

## 2022-03-01 DIAGNOSIS — N17.9 ACUTE KIDNEY FAILURE, UNSPECIFIED: ICD-10-CM

## 2022-03-01 DIAGNOSIS — K92.0 HEMATEMESIS: ICD-10-CM

## 2022-03-01 LAB
ALBUMIN SERPL ELPH-MCNC: 3.1 G/DL — LOW (ref 3.3–5)
ALP SERPL-CCNC: 109 U/L — SIGNIFICANT CHANGE UP (ref 40–120)
ALT FLD-CCNC: 11 U/L — LOW (ref 12–78)
ANION GAP SERPL CALC-SCNC: 13 MMOL/L — SIGNIFICANT CHANGE UP (ref 5–17)
ANION GAP SERPL CALC-SCNC: 8 MMOL/L — SIGNIFICANT CHANGE UP (ref 5–17)
AST SERPL-CCNC: 21 U/L — SIGNIFICANT CHANGE UP (ref 15–37)
BASE EXCESS BLDV CALC-SCNC: -6.4 MMOL/L — LOW (ref -2–3)
BASOPHILS # BLD AUTO: 0.05 K/UL — SIGNIFICANT CHANGE UP (ref 0–0.2)
BASOPHILS NFR BLD AUTO: 0.2 % — SIGNIFICANT CHANGE UP (ref 0–2)
BILIRUB SERPL-MCNC: 0.3 MG/DL — SIGNIFICANT CHANGE UP (ref 0.2–1.2)
BLD GP AB SCN SERPL QL: SIGNIFICANT CHANGE UP
BLOOD GAS COMMENTS, VENOUS: SIGNIFICANT CHANGE UP
BUN SERPL-MCNC: 41 MG/DL — HIGH (ref 7–23)
BUN SERPL-MCNC: 47 MG/DL — HIGH (ref 7–23)
CALCIUM SERPL-MCNC: 8.9 MG/DL — SIGNIFICANT CHANGE UP (ref 8.5–10.1)
CALCIUM SERPL-MCNC: 9.4 MG/DL — SIGNIFICANT CHANGE UP (ref 8.5–10.1)
CHLORIDE SERPL-SCNC: 106 MMOL/L — SIGNIFICANT CHANGE UP (ref 96–108)
CHLORIDE SERPL-SCNC: 116 MMOL/L — HIGH (ref 96–108)
CO2 BLDV-SCNC: 20 MMOL/L — LOW (ref 22–26)
CO2 SERPL-SCNC: 18 MMOL/L — LOW (ref 22–31)
CO2 SERPL-SCNC: 19 MMOL/L — LOW (ref 22–31)
CREAT SERPL-MCNC: 3.82 MG/DL — HIGH (ref 0.5–1.3)
CREAT SERPL-MCNC: 4.23 MG/DL — HIGH (ref 0.5–1.3)
EGFR: 15 ML/MIN/1.73M2 — LOW
EGFR: 17 ML/MIN/1.73M2 — LOW
EOSINOPHIL # BLD AUTO: 0 K/UL — SIGNIFICANT CHANGE UP (ref 0–0.5)
EOSINOPHIL NFR BLD AUTO: 0 % — SIGNIFICANT CHANGE UP (ref 0–6)
FLUAV AG NPH QL: SIGNIFICANT CHANGE UP
FLUBV AG NPH QL: SIGNIFICANT CHANGE UP
GAS PNL BLDV: SIGNIFICANT CHANGE UP
GLUCOSE SERPL-MCNC: 137 MG/DL — HIGH (ref 70–99)
GLUCOSE SERPL-MCNC: 95 MG/DL — SIGNIFICANT CHANGE UP (ref 70–99)
HCO3 BLDV-SCNC: 19 MMOL/L — LOW (ref 22–28)
HCT VFR BLD CALC: 30.8 % — LOW (ref 39–50)
HCT VFR BLD CALC: 39.1 % — SIGNIFICANT CHANGE UP (ref 39–50)
HGB BLD-MCNC: 12.2 G/DL — LOW (ref 13–17)
HGB BLD-MCNC: 9.6 G/DL — LOW (ref 13–17)
HOROWITZ INDEX BLDV+IHG-RTO: 0.21 — SIGNIFICANT CHANGE UP
IMM GRANULOCYTES NFR BLD AUTO: 0.7 % — SIGNIFICANT CHANGE UP (ref 0–1.5)
LACTATE BLDV-MCNC: 3.4 MMOL/L — HIGH (ref 0.56–1.39)
LACTATE SERPL-SCNC: 1.3 MMOL/L — SIGNIFICANT CHANGE UP (ref 0.7–2)
LIDOCAIN IGE QN: 235 U/L — SIGNIFICANT CHANGE UP (ref 73–393)
LYMPHOCYTES # BLD AUTO: 1.11 K/UL — SIGNIFICANT CHANGE UP (ref 1–3.3)
LYMPHOCYTES # BLD AUTO: 5.2 % — LOW (ref 13–44)
MACROCYTES BLD QL: SLIGHT — SIGNIFICANT CHANGE UP
MAGNESIUM SERPL-MCNC: 2.3 MG/DL — SIGNIFICANT CHANGE UP (ref 1.6–2.6)
MANUAL SMEAR VERIFICATION: SIGNIFICANT CHANGE UP
MCHC RBC-ENTMCNC: 26.2 PG — LOW (ref 27–34)
MCHC RBC-ENTMCNC: 26.3 PG — LOW (ref 27–34)
MCHC RBC-ENTMCNC: 31.2 G/DL — LOW (ref 32–36)
MCHC RBC-ENTMCNC: 31.2 G/DL — LOW (ref 32–36)
MCV RBC AUTO: 83.9 FL — SIGNIFICANT CHANGE UP (ref 80–100)
MCV RBC AUTO: 84.4 FL — SIGNIFICANT CHANGE UP (ref 80–100)
MONOCYTES # BLD AUTO: 0.99 K/UL — HIGH (ref 0–0.9)
MONOCYTES NFR BLD AUTO: 4.6 % — SIGNIFICANT CHANGE UP (ref 2–14)
NEUTROPHILS # BLD AUTO: 19.17 K/UL — HIGH (ref 1.8–7.4)
NEUTROPHILS NFR BLD AUTO: 89.3 % — HIGH (ref 43–77)
NRBC # BLD: 0 /100 WBCS — SIGNIFICANT CHANGE UP (ref 0–0)
NRBC # BLD: 0 /100 WBCS — SIGNIFICANT CHANGE UP (ref 0–0)
OB PNL STL: NEGATIVE — SIGNIFICANT CHANGE UP
PCO2 BLDV: 37 MMHG — LOW (ref 42–55)
PH BLDV: 7.32 — SIGNIFICANT CHANGE UP (ref 7.32–7.43)
PLAT MORPH BLD: NORMAL — SIGNIFICANT CHANGE UP
PLATELET # BLD AUTO: 324 K/UL — SIGNIFICANT CHANGE UP (ref 150–400)
PLATELET # BLD AUTO: 450 K/UL — HIGH (ref 150–400)
PO2 BLDV: 37 MMHG — SIGNIFICANT CHANGE UP (ref 25–45)
POTASSIUM SERPL-MCNC: 3.6 MMOL/L — SIGNIFICANT CHANGE UP (ref 3.5–5.3)
POTASSIUM SERPL-MCNC: 4.1 MMOL/L — SIGNIFICANT CHANGE UP (ref 3.5–5.3)
POTASSIUM SERPL-SCNC: 3.6 MMOL/L — SIGNIFICANT CHANGE UP (ref 3.5–5.3)
POTASSIUM SERPL-SCNC: 4.1 MMOL/L — SIGNIFICANT CHANGE UP (ref 3.5–5.3)
PROT SERPL-MCNC: 8 GM/DL — SIGNIFICANT CHANGE UP (ref 6–8.3)
RBC # BLD: 3.65 M/UL — LOW (ref 4.2–5.8)
RBC # BLD: 4.66 M/UL — SIGNIFICANT CHANGE UP (ref 4.2–5.8)
RBC # FLD: 24 % — HIGH (ref 10.3–14.5)
RBC # FLD: 24.3 % — HIGH (ref 10.3–14.5)
RBC BLD AUTO: SIGNIFICANT CHANGE UP
SAO2 % BLDV: 66 % — LOW (ref 94–98)
SARS-COV-2 RNA SPEC QL NAA+PROBE: SIGNIFICANT CHANGE UP
SCHISTOCYTES BLD QL AUTO: SLIGHT — SIGNIFICANT CHANGE UP
SODIUM SERPL-SCNC: 137 MMOL/L — SIGNIFICANT CHANGE UP (ref 135–145)
SODIUM SERPL-SCNC: 143 MMOL/L — SIGNIFICANT CHANGE UP (ref 135–145)
TROPONIN I, HIGH SENSITIVITY RESULT: 11.1 NG/L — SIGNIFICANT CHANGE UP
WBC # BLD: 21.07 K/UL — HIGH (ref 3.8–10.5)
WBC # BLD: 21.46 K/UL — HIGH (ref 3.8–10.5)
WBC # FLD AUTO: 21.07 K/UL — HIGH (ref 3.8–10.5)
WBC # FLD AUTO: 21.46 K/UL — HIGH (ref 3.8–10.5)

## 2022-03-01 PROCEDURE — 93010 ELECTROCARDIOGRAM REPORT: CPT

## 2022-03-01 PROCEDURE — 71045 X-RAY EXAM CHEST 1 VIEW: CPT | Mod: 26

## 2022-03-01 PROCEDURE — 99223 1ST HOSP IP/OBS HIGH 75: CPT

## 2022-03-01 PROCEDURE — 99285 EMERGENCY DEPT VISIT HI MDM: CPT

## 2022-03-01 PROCEDURE — 74176 CT ABD & PELVIS W/O CONTRAST: CPT | Mod: 26,MA

## 2022-03-01 RX ORDER — ACETAMINOPHEN 500 MG
650 TABLET ORAL EVERY 6 HOURS
Refills: 0 | Status: DISCONTINUED | OUTPATIENT
Start: 2022-03-01 | End: 2022-03-02

## 2022-03-01 RX ORDER — PANTOPRAZOLE SODIUM 20 MG/1
40 TABLET, DELAYED RELEASE ORAL
Refills: 0 | Status: DISCONTINUED | OUTPATIENT
Start: 2022-03-01 | End: 2022-03-01

## 2022-03-01 RX ORDER — SODIUM CHLORIDE 9 MG/ML
1000 INJECTION, SOLUTION INTRAVENOUS
Refills: 0 | Status: DISCONTINUED | OUTPATIENT
Start: 2022-03-01 | End: 2022-03-02

## 2022-03-01 RX ORDER — GABAPENTIN 400 MG/1
200 CAPSULE ORAL DAILY
Refills: 0 | Status: DISCONTINUED | OUTPATIENT
Start: 2022-03-01 | End: 2022-03-05

## 2022-03-01 RX ORDER — SODIUM BICARBONATE 1 MEQ/ML
650 SYRINGE (ML) INTRAVENOUS EVERY 6 HOURS
Refills: 0 | Status: DISCONTINUED | OUTPATIENT
Start: 2022-03-01 | End: 2022-03-05

## 2022-03-01 RX ORDER — OXYCODONE HYDROCHLORIDE 5 MG/1
5 TABLET ORAL EVERY 6 HOURS
Refills: 0 | Status: DISCONTINUED | OUTPATIENT
Start: 2022-03-01 | End: 2022-03-02

## 2022-03-01 RX ORDER — PREGABALIN 225 MG/1
1000 CAPSULE ORAL DAILY
Refills: 0 | Status: DISCONTINUED | OUTPATIENT
Start: 2022-03-01 | End: 2022-03-05

## 2022-03-01 RX ORDER — PANTOPRAZOLE SODIUM 20 MG/1
80 TABLET, DELAYED RELEASE ORAL ONCE
Refills: 0 | Status: COMPLETED | OUTPATIENT
Start: 2022-03-01 | End: 2022-03-01

## 2022-03-01 RX ORDER — FOLIC ACID 0.8 MG
1 TABLET ORAL DAILY
Refills: 0 | Status: DISCONTINUED | OUTPATIENT
Start: 2022-03-01 | End: 2022-03-05

## 2022-03-01 RX ORDER — CALCIUM ACETATE 667 MG
667 TABLET ORAL
Refills: 0 | Status: DISCONTINUED | OUTPATIENT
Start: 2022-03-01 | End: 2022-03-05

## 2022-03-01 RX ORDER — THIAMINE MONONITRATE (VIT B1) 100 MG
100 TABLET ORAL DAILY
Refills: 0 | Status: DISCONTINUED | OUTPATIENT
Start: 2022-03-01 | End: 2022-03-05

## 2022-03-01 RX ORDER — MORPHINE SULFATE 50 MG/1
4 CAPSULE, EXTENDED RELEASE ORAL ONCE
Refills: 0 | Status: DISCONTINUED | OUTPATIENT
Start: 2022-03-01 | End: 2022-03-01

## 2022-03-01 RX ORDER — SODIUM CHLORIDE 9 MG/ML
1000 INJECTION INTRAMUSCULAR; INTRAVENOUS; SUBCUTANEOUS ONCE
Refills: 0 | Status: COMPLETED | OUTPATIENT
Start: 2022-03-01 | End: 2022-03-01

## 2022-03-01 RX ORDER — LIPASE/PROTEASE/AMYLASE 16-48-48K
2 CAPSULE,DELAYED RELEASE (ENTERIC COATED) ORAL
Refills: 0 | Status: DISCONTINUED | OUTPATIENT
Start: 2022-03-01 | End: 2022-03-05

## 2022-03-01 RX ORDER — SUCRALFATE 1 G
1 TABLET ORAL
Refills: 0 | Status: DISCONTINUED | OUTPATIENT
Start: 2022-03-01 | End: 2022-03-05

## 2022-03-01 RX ORDER — SUCRALFATE 1 G
1 TABLET ORAL
Refills: 0 | Status: DISCONTINUED | OUTPATIENT
Start: 2022-03-01 | End: 2022-03-01

## 2022-03-01 RX ORDER — SENNA PLUS 8.6 MG/1
2 TABLET ORAL AT BEDTIME
Refills: 0 | Status: DISCONTINUED | OUTPATIENT
Start: 2022-03-01 | End: 2022-03-05

## 2022-03-01 RX ORDER — ONDANSETRON 8 MG/1
4 TABLET, FILM COATED ORAL EVERY 8 HOURS
Refills: 0 | Status: DISCONTINUED | OUTPATIENT
Start: 2022-03-01 | End: 2022-03-05

## 2022-03-01 RX ORDER — PANTOPRAZOLE SODIUM 20 MG/1
40 TABLET, DELAYED RELEASE ORAL EVERY 12 HOURS
Refills: 0 | Status: DISCONTINUED | OUTPATIENT
Start: 2022-03-01 | End: 2022-03-05

## 2022-03-01 RX ORDER — GABAPENTIN 400 MG/1
100 CAPSULE ORAL
Refills: 0 | Status: DISCONTINUED | OUTPATIENT
Start: 2022-03-01 | End: 2022-03-01

## 2022-03-01 RX ORDER — ONDANSETRON 8 MG/1
4 TABLET, FILM COATED ORAL ONCE
Refills: 0 | Status: COMPLETED | OUTPATIENT
Start: 2022-03-01 | End: 2022-03-01

## 2022-03-01 RX ORDER — FAMOTIDINE 10 MG/ML
20 INJECTION INTRAVENOUS DAILY
Refills: 0 | Status: DISCONTINUED | OUTPATIENT
Start: 2022-03-01 | End: 2022-03-01

## 2022-03-01 RX ORDER — TAMSULOSIN HYDROCHLORIDE 0.4 MG/1
0.4 CAPSULE ORAL AT BEDTIME
Refills: 0 | Status: DISCONTINUED | OUTPATIENT
Start: 2022-03-01 | End: 2022-03-05

## 2022-03-01 RX ORDER — FAMOTIDINE 10 MG/ML
20 INJECTION INTRAVENOUS ONCE
Refills: 0 | Status: COMPLETED | OUTPATIENT
Start: 2022-03-01 | End: 2022-03-01

## 2022-03-01 RX ORDER — LANOLIN ALCOHOL/MO/W.PET/CERES
3 CREAM (GRAM) TOPICAL AT BEDTIME
Refills: 0 | Status: DISCONTINUED | OUTPATIENT
Start: 2022-03-01 | End: 2022-03-05

## 2022-03-01 RX ORDER — MORPHINE SULFATE 50 MG/1
2 CAPSULE, EXTENDED RELEASE ORAL ONCE
Refills: 0 | Status: DISCONTINUED | OUTPATIENT
Start: 2022-03-01 | End: 2022-03-01

## 2022-03-01 RX ADMIN — MORPHINE SULFATE 4 MILLIGRAM(S): 50 CAPSULE, EXTENDED RELEASE ORAL at 07:07

## 2022-03-01 RX ADMIN — ONDANSETRON 4 MILLIGRAM(S): 8 TABLET, FILM COATED ORAL at 22:28

## 2022-03-01 RX ADMIN — Medication 667 MILLIGRAM(S): at 19:10

## 2022-03-01 RX ADMIN — MORPHINE SULFATE 2 MILLIGRAM(S): 50 CAPSULE, EXTENDED RELEASE ORAL at 06:55

## 2022-03-01 RX ADMIN — Medication 100 MILLIGRAM(S): at 12:58

## 2022-03-01 RX ADMIN — MORPHINE SULFATE 4 MILLIGRAM(S): 50 CAPSULE, EXTENDED RELEASE ORAL at 06:43

## 2022-03-01 RX ADMIN — SODIUM CHLORIDE 1000 MILLILITER(S): 9 INJECTION INTRAMUSCULAR; INTRAVENOUS; SUBCUTANEOUS at 07:30

## 2022-03-01 RX ADMIN — Medication 667 MILLIGRAM(S): at 12:58

## 2022-03-01 RX ADMIN — SODIUM CHLORIDE 125 MILLILITER(S): 9 INJECTION, SOLUTION INTRAVENOUS at 11:41

## 2022-03-01 RX ADMIN — GABAPENTIN 200 MILLIGRAM(S): 400 CAPSULE ORAL at 12:58

## 2022-03-01 RX ADMIN — SODIUM CHLORIDE 1000 MILLILITER(S): 9 INJECTION INTRAMUSCULAR; INTRAVENOUS; SUBCUTANEOUS at 10:11

## 2022-03-01 RX ADMIN — OXYCODONE HYDROCHLORIDE 5 MILLIGRAM(S): 5 TABLET ORAL at 19:03

## 2022-03-01 RX ADMIN — Medication 2 CAPSULE(S): at 12:58

## 2022-03-01 RX ADMIN — Medication 650 MILLIGRAM(S): at 22:36

## 2022-03-01 RX ADMIN — MORPHINE SULFATE 2 MILLIGRAM(S): 50 CAPSULE, EXTENDED RELEASE ORAL at 07:10

## 2022-03-01 RX ADMIN — Medication 1 MILLIGRAM(S): at 12:58

## 2022-03-01 RX ADMIN — OXYCODONE HYDROCHLORIDE 5 MILLIGRAM(S): 5 TABLET ORAL at 18:38

## 2022-03-01 RX ADMIN — Medication 2 CAPSULE(S): at 19:11

## 2022-03-01 RX ADMIN — SODIUM CHLORIDE 125 MILLILITER(S): 9 INJECTION, SOLUTION INTRAVENOUS at 23:24

## 2022-03-01 RX ADMIN — OXYCODONE HYDROCHLORIDE 5 MILLIGRAM(S): 5 TABLET ORAL at 11:41

## 2022-03-01 RX ADMIN — SODIUM CHLORIDE 1000 MILLILITER(S): 9 INJECTION INTRAMUSCULAR; INTRAVENOUS; SUBCUTANEOUS at 06:44

## 2022-03-01 RX ADMIN — Medication 1 TABLET(S): at 12:58

## 2022-03-01 RX ADMIN — Medication 650 MILLIGRAM(S): at 12:58

## 2022-03-01 RX ADMIN — FAMOTIDINE 20 MILLIGRAM(S): 10 INJECTION INTRAVENOUS at 06:44

## 2022-03-01 RX ADMIN — Medication 650 MILLIGRAM(S): at 23:36

## 2022-03-01 RX ADMIN — SODIUM CHLORIDE 1000 MILLILITER(S): 9 INJECTION INTRAMUSCULAR; INTRAVENOUS; SUBCUTANEOUS at 08:34

## 2022-03-01 RX ADMIN — SENNA PLUS 2 TABLET(S): 8.6 TABLET ORAL at 22:36

## 2022-03-01 RX ADMIN — Medication 1 GRAM(S): at 19:52

## 2022-03-01 RX ADMIN — Medication 650 MILLIGRAM(S): at 19:10

## 2022-03-01 RX ADMIN — Medication 1 GRAM(S): at 12:57

## 2022-03-01 RX ADMIN — Medication 3 MILLIGRAM(S): at 22:29

## 2022-03-01 RX ADMIN — PREGABALIN 1000 MICROGRAM(S): 225 CAPSULE ORAL at 12:58

## 2022-03-01 RX ADMIN — TAMSULOSIN HYDROCHLORIDE 0.4 MILLIGRAM(S): 0.4 CAPSULE ORAL at 22:29

## 2022-03-01 RX ADMIN — ONDANSETRON 4 MILLIGRAM(S): 8 TABLET, FILM COATED ORAL at 06:44

## 2022-03-01 RX ADMIN — PANTOPRAZOLE SODIUM 40 MILLIGRAM(S): 20 TABLET, DELAYED RELEASE ORAL at 19:11

## 2022-03-01 RX ADMIN — PANTOPRAZOLE SODIUM 80 MILLIGRAM(S): 20 TABLET, DELAYED RELEASE ORAL at 06:43

## 2022-03-01 NOTE — ED PROVIDER NOTE - CARE PLAN
1 Principal Discharge DX:	Epigastric pain   Principal Discharge DX:	Epigastric pain  Secondary Diagnosis:	Upper GI bleed

## 2022-03-01 NOTE — H&P ADULT - NSHPPHYSICALEXAM_GEN_ALL_CORE
CONSTITUTIONAL: Well developed, well nourished, alert and cooperative, mild distress due to pain  EYES: PERRL,  no scleral icterus  ENT: Mucosa moist, tongue normal.   NECK: Neck supple, trachea midline, non-tender  CARDIAC: Normal S1 and S2. Regular rate and rhythms. No murmurs.   LUNGS: Clear to auscultation, equal air entry both lungs. No rales, rhonchi, wheezing. Normal respiratory effort.   ABDOMEN: Epigastric tenderness noted. No guarding or rebound tenderness. No hepatomegaly or splenomegaly. Bowel sound normal.  MUSCULOSKELETAL: Normocephalic, atraumatic. No clubbing or cyanosis. No significant deformity or joint abnormality  NEUROLOGICAL: No gross motor or sensory deficits.  SKIN: no lesions or eruptions. Decrease turgor  PSYCHIATRIC: A&O x 3, appropriate mood and affect

## 2022-03-01 NOTE — H&P ADULT - PROBLEM SELECTOR PLAN 3
WBC 21.46, Hb 12.2 ( was 7-8 range in 01/2022)  Likely reactive with component of hemoconcentration  No source of infection  Monitor CBC

## 2022-03-01 NOTE — ED PROVIDER NOTE - OBJECTIVE STATEMENT
60 yo man hx of etoh use, chronic pancreatitis, hep c, gout, gastritis, pancreatitis, esophagitis p/w 1 day acute on chronic epigastric pain worsening for one day. Patient reports similar pain to prior, severe, nothing makes it better, has been taking PPI at home. CT aorta study negative here in December 2021 with similar symptoms. Denies fevers. Has been spitting up blood. Also nausea and vomiting since yesterday.

## 2022-03-01 NOTE — PATIENT PROFILE ADULT - FALL HARM RISK - HARM RISK INTERVENTIONS

## 2022-03-01 NOTE — H&P ADULT - HISTORY OF PRESENT ILLNESS
61 years old male with h/o ETOH abuse, pancreatitis, esophagitis, hep C, CKD 4, present to ED with complain of abd pain, N/V and coffee ground emesisi. Patient reported severe, 10/10, sharp/burning epigastric pain, associated with multiple episode of coffee ground emesis.   Tachycardic to 116, BP stable upon ED arrival, HR improve to 107 after IV fluid. EKG with sinus tachy, , QTc 468. WBC 21.46, Hb 12.2 ( was 7-8 range in 01/2022), plt 450, lactate 1.3, K 4.1, Cr 4.23 ( 3.37 on 02/09/2022), hsTnT 11.1. CXR image reviewed, no focal consolidation. CT abd/pelvis with no acute pathology    SH: former ETOH use

## 2022-03-01 NOTE — ED PROVIDER NOTE - PROGRESS NOTE DETAILS
Pt was seen and treated by Dr. Gardner ct abd/pelvis pending Pt c/o epigastric abd pain nausea and vomiting ( small dark brown clots) . Dr. Cuellar is notified and admit pt.

## 2022-03-01 NOTE — H&P ADULT - ASSESSMENT
61 years old male with h/o ETOH abuse, pancreatitis, esophagitis, hep C, CKD 4, present to ED with complain of abd pain, N/V and coffee ground emesis  Tachycardic to 116, BP stable upon ED arrival, HR improve to 107 after IV fluid. EKG with sinus tachy, , QTc 468. WBC 21.46, Hb 12.2 ( was 7-8 range in 01/2022), plt 450, lactate 1.3, K 4.1, Cr 4.23 ( 3.37 on 02/09/2022), hsTnT 11.1. CXR image reviewed, no focal consolidation. CT abd/pelvis with no acute pathology    Admitted with coffee ground emesis, YUNIEL on CKD

## 2022-03-01 NOTE — ED ADULT NURSE NOTE - OBJECTIVE STATEMENT
Pt received on stretcher c/o abdominal pain and spitting up coffee ground sputum. Pt c/o vomiting several times before arrival and unable to keep any food down. pt is A&Ox3, no labored breathing.

## 2022-03-01 NOTE — H&P ADULT - PROBLEM SELECTOR PLAN 2
Cr 4.23 ( 3.37 on 02/09/2022)  Due to dehydration  Received 3 L bolus in ED  Continue IV hydration  Monitor renal function  Avoid nephrotoxic substances

## 2022-03-01 NOTE — H&P ADULT - PROBLEM SELECTOR PLAN 1
coffee ground emesis and epigastric pain  IV fluid  Monitor H/H  IV pantoprazole 40mg bid  Sucralfate  Maalox prn  GI consulted  Recently had EGD in 01/2022

## 2022-03-01 NOTE — ED PROVIDER NOTE - CLINICAL SUMMARY MEDICAL DECISION MAKING FREE TEXT BOX
60 yo man hx of etoh use, chronic pancreatitis, hep c, gout, gastritis, pancreatitis, esophagitis p/w 1 day acute on chronic epigastric pain worsening for one day. Patient reports similar pain to prior, severe, nothing makes it better, has been taking PPI at home. CT aorta study negative here in December 2021 with similar symptoms. history of gastric perforation in 2019. Vitals notable for tachycardia. Exam with epigastric tenderness. Screen for myocardial injury, screen for gastric perforation, likely gastritis with esophagitis or pancreatitis. R/o covid infection. CXR r/o free air, basic labs, troponin, vbg with lactate, CT abdomen and pelvis with contrast, ecg. covid swab.  Will give pantoprazole for gastritis, will give fluids for likely dehydration with tachycardia, will give pepcid for gastritis as well. Morphine for possible pancreatitis pain.

## 2022-03-01 NOTE — ED ADULT NURSE REASSESSMENT NOTE - NS ED NURSE REASSESS COMMENT FT1
Patient reports 7/10 periumbilical abdominal pain. Patient lying in room quietly until he sees a staff member then he starts to call out, moan, and yell out. Dr. Sutton aware. Patient received morphine IVP Just before 0700hours; patient lying quietly on stretcher until introduced to daytime staff. Abdomen soft non tender. Guiac completed by Dr. Sutton, Sent to lab. IVF infusing wide open: no signs of infiltration or infection.

## 2022-03-01 NOTE — CONSULT NOTE ADULT - SUBJECTIVE AND OBJECTIVE BOX
Full consult dictated    Plan: 60 YO Male with h/o ETOH abuse, pancreatitis, esophagitis, hep C - multiple medical problems - admitted with n/v; + coffee ground emesis. Pt on PPI's for GERD. Pt likely with persistent GERD and UGI bleed.  Pt will be given a clear liquid diet and started on IV protonix and PO Carafate. H/H presently stable; If Hgb drops will consider EGD.

## 2022-03-02 LAB
ALBUMIN SERPL ELPH-MCNC: 2.5 G/DL — LOW (ref 3.3–5)
ALP SERPL-CCNC: 77 U/L — SIGNIFICANT CHANGE UP (ref 40–120)
ALT FLD-CCNC: 8 U/L — LOW (ref 12–78)
AMPHET UR-MCNC: NEGATIVE — SIGNIFICANT CHANGE UP
ANION GAP SERPL CALC-SCNC: 7 MMOL/L — SIGNIFICANT CHANGE UP (ref 5–17)
ANION GAP SERPL CALC-SCNC: 7 MMOL/L — SIGNIFICANT CHANGE UP (ref 5–17)
AST SERPL-CCNC: 6 U/L — LOW (ref 15–37)
BARBITURATES UR SCN-MCNC: NEGATIVE — SIGNIFICANT CHANGE UP
BENZODIAZ UR-MCNC: NEGATIVE — SIGNIFICANT CHANGE UP
BILIRUB SERPL-MCNC: 0.2 MG/DL — SIGNIFICANT CHANGE UP (ref 0.2–1.2)
BUN SERPL-MCNC: 39 MG/DL — HIGH (ref 7–23)
BUN SERPL-MCNC: 39 MG/DL — HIGH (ref 7–23)
CALCIUM SERPL-MCNC: 8.7 MG/DL — SIGNIFICANT CHANGE UP (ref 8.5–10.1)
CALCIUM SERPL-MCNC: 8.8 MG/DL — SIGNIFICANT CHANGE UP (ref 8.5–10.1)
CHLORIDE SERPL-SCNC: 119 MMOL/L — HIGH (ref 96–108)
CHLORIDE SERPL-SCNC: 119 MMOL/L — HIGH (ref 96–108)
CO2 SERPL-SCNC: 21 MMOL/L — LOW (ref 22–31)
CO2 SERPL-SCNC: 21 MMOL/L — LOW (ref 22–31)
COCAINE METAB.OTHER UR-MCNC: NEGATIVE — SIGNIFICANT CHANGE UP
CREAT SERPL-MCNC: 3.61 MG/DL — HIGH (ref 0.5–1.3)
CREAT SERPL-MCNC: 3.63 MG/DL — HIGH (ref 0.5–1.3)
EGFR: 18 ML/MIN/1.73M2 — LOW
EGFR: 18 ML/MIN/1.73M2 — LOW
ETHANOL SERPL-MCNC: <10 MG/DL — SIGNIFICANT CHANGE UP (ref 0–10)
GLUCOSE SERPL-MCNC: 83 MG/DL — SIGNIFICANT CHANGE UP (ref 70–99)
GLUCOSE SERPL-MCNC: 86 MG/DL — SIGNIFICANT CHANGE UP (ref 70–99)
HCT VFR BLD CALC: 28 % — LOW (ref 39–50)
HCT VFR BLD CALC: 33.3 % — LOW (ref 39–50)
HGB BLD-MCNC: 8.5 G/DL — LOW (ref 13–17)
HGB BLD-MCNC: 9.9 G/DL — LOW (ref 13–17)
MAGNESIUM SERPL-MCNC: 1.9 MG/DL — SIGNIFICANT CHANGE UP (ref 1.6–2.6)
MCHC RBC-ENTMCNC: 26.1 PG — LOW (ref 27–34)
MCHC RBC-ENTMCNC: 26.2 PG — LOW (ref 27–34)
MCHC RBC-ENTMCNC: 29.7 G/DL — LOW (ref 32–36)
MCHC RBC-ENTMCNC: 30.4 G/DL — LOW (ref 32–36)
MCV RBC AUTO: 86.4 FL — SIGNIFICANT CHANGE UP (ref 80–100)
MCV RBC AUTO: 87.9 FL — SIGNIFICANT CHANGE UP (ref 80–100)
METHADONE UR-MCNC: NEGATIVE — SIGNIFICANT CHANGE UP
NRBC # BLD: 0 /100 WBCS — SIGNIFICANT CHANGE UP (ref 0–0)
NRBC # BLD: 0 /100 WBCS — SIGNIFICANT CHANGE UP (ref 0–0)
OPIATES UR-MCNC: POSITIVE — SIGNIFICANT CHANGE UP
PCP SPEC-MCNC: SIGNIFICANT CHANGE UP
PCP UR-MCNC: NEGATIVE — SIGNIFICANT CHANGE UP
PHOSPHATE SERPL-MCNC: 3.1 MG/DL — SIGNIFICANT CHANGE UP (ref 2.5–4.5)
PLATELET # BLD AUTO: 291 K/UL — SIGNIFICANT CHANGE UP (ref 150–400)
PLATELET # BLD AUTO: 298 K/UL — SIGNIFICANT CHANGE UP (ref 150–400)
POTASSIUM SERPL-MCNC: 3.6 MMOL/L — SIGNIFICANT CHANGE UP (ref 3.5–5.3)
POTASSIUM SERPL-MCNC: 3.6 MMOL/L — SIGNIFICANT CHANGE UP (ref 3.5–5.3)
POTASSIUM SERPL-SCNC: 3.6 MMOL/L — SIGNIFICANT CHANGE UP (ref 3.5–5.3)
POTASSIUM SERPL-SCNC: 3.6 MMOL/L — SIGNIFICANT CHANGE UP (ref 3.5–5.3)
PROT SERPL-MCNC: 6.1 GM/DL — SIGNIFICANT CHANGE UP (ref 6–8.3)
RBC # BLD: 3.24 M/UL — LOW (ref 4.2–5.8)
RBC # BLD: 3.79 M/UL — LOW (ref 4.2–5.8)
RBC # FLD: 24.3 % — HIGH (ref 10.3–14.5)
RBC # FLD: 24.5 % — HIGH (ref 10.3–14.5)
SODIUM SERPL-SCNC: 147 MMOL/L — HIGH (ref 135–145)
SODIUM SERPL-SCNC: 147 MMOL/L — HIGH (ref 135–145)
THC UR QL: NEGATIVE — SIGNIFICANT CHANGE UP
WBC # BLD: 14.1 K/UL — HIGH (ref 3.8–10.5)
WBC # BLD: 16.39 K/UL — HIGH (ref 3.8–10.5)
WBC # FLD AUTO: 14.1 K/UL — HIGH (ref 3.8–10.5)
WBC # FLD AUTO: 16.39 K/UL — HIGH (ref 3.8–10.5)

## 2022-03-02 PROCEDURE — 99233 SBSQ HOSP IP/OBS HIGH 50: CPT

## 2022-03-02 RX ORDER — IRON SUCROSE 20 MG/ML
200 INJECTION, SOLUTION INTRAVENOUS ONCE
Refills: 0 | Status: COMPLETED | OUTPATIENT
Start: 2022-03-03 | End: 2022-03-03

## 2022-03-02 RX ORDER — OXYCODONE HYDROCHLORIDE 5 MG/1
5 TABLET ORAL EVERY 6 HOURS
Refills: 0 | Status: DISCONTINUED | OUTPATIENT
Start: 2022-03-02 | End: 2022-03-03

## 2022-03-02 RX ORDER — MORPHINE SULFATE 50 MG/1
2 CAPSULE, EXTENDED RELEASE ORAL EVERY 4 HOURS
Refills: 0 | Status: DISCONTINUED | OUTPATIENT
Start: 2022-03-02 | End: 2022-03-03

## 2022-03-02 RX ORDER — ACETAMINOPHEN 500 MG
650 TABLET ORAL EVERY 6 HOURS
Refills: 0 | Status: DISCONTINUED | OUTPATIENT
Start: 2022-03-02 | End: 2022-03-05

## 2022-03-02 RX ORDER — SODIUM CHLORIDE 9 MG/ML
1000 INJECTION, SOLUTION INTRAVENOUS
Refills: 0 | Status: COMPLETED | OUTPATIENT
Start: 2022-03-02 | End: 2022-03-04

## 2022-03-02 RX ORDER — SODIUM CHLORIDE 9 MG/ML
1000 INJECTION, SOLUTION INTRAVENOUS
Refills: 0 | Status: DISCONTINUED | OUTPATIENT
Start: 2022-03-02 | End: 2022-03-02

## 2022-03-02 RX ORDER — METOCLOPRAMIDE HCL 10 MG
10 TABLET ORAL
Refills: 0 | Status: DISCONTINUED | OUTPATIENT
Start: 2022-03-02 | End: 2022-03-05

## 2022-03-02 RX ADMIN — MORPHINE SULFATE 2 MILLIGRAM(S): 50 CAPSULE, EXTENDED RELEASE ORAL at 14:41

## 2022-03-02 RX ADMIN — Medication 10 MILLIGRAM(S): at 14:04

## 2022-03-02 RX ADMIN — TAMSULOSIN HYDROCHLORIDE 0.4 MILLIGRAM(S): 0.4 CAPSULE ORAL at 21:31

## 2022-03-02 RX ADMIN — Medication 1 GRAM(S): at 17:24

## 2022-03-02 RX ADMIN — MORPHINE SULFATE 2 MILLIGRAM(S): 50 CAPSULE, EXTENDED RELEASE ORAL at 18:50

## 2022-03-02 RX ADMIN — Medication 2 CAPSULE(S): at 17:25

## 2022-03-02 RX ADMIN — Medication 1 GRAM(S): at 00:15

## 2022-03-02 RX ADMIN — OXYCODONE HYDROCHLORIDE 5 MILLIGRAM(S): 5 TABLET ORAL at 08:09

## 2022-03-02 RX ADMIN — Medication 650 MILLIGRAM(S): at 23:05

## 2022-03-02 RX ADMIN — GABAPENTIN 200 MILLIGRAM(S): 400 CAPSULE ORAL at 12:16

## 2022-03-02 RX ADMIN — OXYCODONE HYDROCHLORIDE 5 MILLIGRAM(S): 5 TABLET ORAL at 09:08

## 2022-03-02 RX ADMIN — Medication 1 GRAM(S): at 23:06

## 2022-03-02 RX ADMIN — Medication 650 MILLIGRAM(S): at 17:24

## 2022-03-02 RX ADMIN — PANTOPRAZOLE SODIUM 40 MILLIGRAM(S): 20 TABLET, DELAYED RELEASE ORAL at 05:06

## 2022-03-02 RX ADMIN — MORPHINE SULFATE 2 MILLIGRAM(S): 50 CAPSULE, EXTENDED RELEASE ORAL at 22:44

## 2022-03-02 RX ADMIN — Medication 667 MILLIGRAM(S): at 08:12

## 2022-03-02 RX ADMIN — SENNA PLUS 2 TABLET(S): 8.6 TABLET ORAL at 21:31

## 2022-03-02 RX ADMIN — Medication 10 MILLIGRAM(S): at 17:25

## 2022-03-02 RX ADMIN — Medication 2 CAPSULE(S): at 12:14

## 2022-03-02 RX ADMIN — Medication 1 GRAM(S): at 05:04

## 2022-03-02 RX ADMIN — Medication 2 CAPSULE(S): at 08:12

## 2022-03-02 RX ADMIN — Medication 10 MILLIGRAM(S): at 23:06

## 2022-03-02 RX ADMIN — Medication 650 MILLIGRAM(S): at 00:14

## 2022-03-02 RX ADMIN — MORPHINE SULFATE 2 MILLIGRAM(S): 50 CAPSULE, EXTENDED RELEASE ORAL at 18:35

## 2022-03-02 RX ADMIN — Medication 1 MILLIGRAM(S): at 12:16

## 2022-03-02 RX ADMIN — PANTOPRAZOLE SODIUM 40 MILLIGRAM(S): 20 TABLET, DELAYED RELEASE ORAL at 17:25

## 2022-03-02 RX ADMIN — SODIUM CHLORIDE 65 MILLILITER(S): 9 INJECTION, SOLUTION INTRAVENOUS at 23:30

## 2022-03-02 RX ADMIN — PREGABALIN 1000 MICROGRAM(S): 225 CAPSULE ORAL at 14:04

## 2022-03-02 RX ADMIN — SODIUM CHLORIDE 75 MILLILITER(S): 9 INJECTION, SOLUTION INTRAVENOUS at 14:05

## 2022-03-02 RX ADMIN — Medication 650 MILLIGRAM(S): at 05:04

## 2022-03-02 RX ADMIN — Medication 1 GRAM(S): at 12:13

## 2022-03-02 RX ADMIN — Medication 100 MILLIGRAM(S): at 12:13

## 2022-03-02 RX ADMIN — MORPHINE SULFATE 2 MILLIGRAM(S): 50 CAPSULE, EXTENDED RELEASE ORAL at 23:14

## 2022-03-02 RX ADMIN — Medication 650 MILLIGRAM(S): at 12:12

## 2022-03-02 RX ADMIN — Medication 667 MILLIGRAM(S): at 14:04

## 2022-03-02 RX ADMIN — OXYCODONE HYDROCHLORIDE 5 MILLIGRAM(S): 5 TABLET ORAL at 02:34

## 2022-03-02 RX ADMIN — OXYCODONE HYDROCHLORIDE 5 MILLIGRAM(S): 5 TABLET ORAL at 01:34

## 2022-03-02 RX ADMIN — Medication 1 TABLET(S): at 12:16

## 2022-03-02 RX ADMIN — Medication 667 MILLIGRAM(S): at 17:25

## 2022-03-02 RX ADMIN — MORPHINE SULFATE 2 MILLIGRAM(S): 50 CAPSULE, EXTENDED RELEASE ORAL at 14:26

## 2022-03-02 NOTE — PROGRESS NOTE ADULT - SUBJECTIVE AND OBJECTIVE BOX
Patient is a 61y old  Male who presents with a chief complaint of coffee ground emesis (01 Mar 2022 17:32)        HPI:  61 years old male with h/o ETOH abuse, pancreatitis, esophagitis, hep C, CKD 4, present to ED with complain of abd pain, N/V and coffee ground emesisi. Patient reported severe, 10/10, sharp/burning epigastric pain, associated with multiple episode of coffee ground emesis.   Tachycardic to 116, BP stable upon ED arrival, HR improve to 107 after IV fluid. EKG with sinus tachy, , QTc 468. WBC 21.46, Hb 12.2 ( was 7-8 range in 01/2022), plt 450, lactate 1.3, K 4.1, Cr 4.23 ( 3.37 on 02/09/2022), hsTnT 11.1. CXR image reviewed, no focal consolidation. CT abd/pelvis with no acute pathology    SH: former ETOH use (01 Mar 2022 11:44)      SUBJECTIVE & OBJECTIVE: Pt seen and examined at bedside.   no overnight events.         PHYSICAL EXAM:  T(C): 36.7 (03-02-22 @ 05:02), Max: 37.6 (03-01-22 @ 17:39)  HR: 99 (03-02-22 @ 05:02) (71 - 104)  BP: 158/74 (03-02-22 @ 05:02) (126/77 - 166/71)  RR: 18 (03-02-22 @ 05:02) (16 - 20)  SpO2: 99% (03-02-22 @ 05:02) (96% - 100%)  Wt(kg): -- Height (cm): 185.4 (03-01 @ 17:39)  I&O's Detail    01 Mar 2022 07:01  -  02 Mar 2022 07:00  --------------------------------------------------------  IN:    Lactated Ringers: 1475 mL    Oral Fluid: 960 mL  Total IN: 2435 mL    OUT:    Emesis (mL): 130 mL    Voided (mL): 1180 mL  Total OUT: 1310 mL    Total NET: 1125 mL        GENERAL: NAD, well-groomed, well-developed, no increased WOB  HEAD:  Atraumatic, Normocephalic  EYES: EOMI, PERRLA, conjunctiva and sclera clear  ENMT: Moist mucous membranes  NECK: Supple, No JVD  NERVOUS SYSTEM:  Alert & Oriented X3, no focal neuro deficits   CHEST/LUNG: Clear to auscultation bilaterally; No rales, rhonchi, wheezing, or rubs  HEART: Regular rate and rhythm; No murmurs, rubs, or gallops  ABDOMEN: Soft, Nontender, Nondistended; Bowel sounds present  EXTREMITIES:  2+ Peripheral Pulses b/l, No clubbing, cyanosis, calf tenderness or edema b/l    MEDICATIONS  (STANDING):  calcium acetate 667 milliGRAM(s) Oral three times a day with meals  cyanocobalamin 1000 MICROGram(s) Oral daily  folic acid 1 milliGRAM(s) Oral daily  gabapentin 200 milliGRAM(s) Oral daily  lactated ringers. 1000 milliLiter(s) (125 mL/Hr) IV Continuous <Continuous>  multivitamin 1 Tablet(s) Oral daily  pancrelipase  (CREON  6,000 Lipase Units) 2 Capsule(s) Oral three times a day with meals  pantoprazole  Injectable 40 milliGRAM(s) IV Push every 12 hours  senna 2 Tablet(s) Oral at bedtime  sodium bicarbonate 650 milliGRAM(s) Oral every 6 hours  sucralfate suspension 1 Gram(s) Oral four times a day  tamsulosin 0.4 milliGRAM(s) Oral at bedtime  thiamine 100 milliGRAM(s) Oral daily    MEDICATIONS  (PRN):  acetaminophen     Tablet .. 650 milliGRAM(s) Oral every 6 hours PRN Mild Pain (1 - 3), Moderate Pain (4 - 6)  aluminum hydroxide/magnesium hydroxide/simethicone Suspension 30 milliLiter(s) Oral every 4 hours PRN Dyspepsia  melatonin 3 milliGRAM(s) Oral at bedtime PRN Insomnia  ondansetron Injectable 4 milliGRAM(s) IV Push every 8 hours PRN Nausea and/or Vomiting  oxyCODONE    IR 5 milliGRAM(s) Oral every 6 hours PRN Severe Pain (7 - 10)      LABS:                        8.5    16.39 )-----------( 291      ( 02 Mar 2022 08:15 )             28.0     03-02    147<H>  |  119<H>  |  39<H>  ----------------------------<  83  3.6   |  21<L>  |  3.63<H>    Ca    8.7      02 Mar 2022 08:15  Phos  3.1     03-02  Mg     2.3     03-01    TPro  6.1  /  Alb  2.5<L>  /  TBili  0.2  /  DBili  x   /  AST  6<L>  /  ALT  8<L>  /  AlkPhos  77  03-02        Magnesium, Serum: 2.3 mg/dL (03-01 @ 18:34)    CAPILLARY BLOOD GLUCOSE                RECENT CULTURES:      RADIOLOGY & ADDITIONAL TESTS:          Imaging Personally Reviewed:  [ ] YES  [ ] NO    Consultant(s) Notes Reviewed:  [x ] YES  [ ] NO    Care Discussed with Consultants/Other Providers [x ] YES  [ ] NO     Patient is a 61y old  Male who presents with a chief complaint of coffee ground emesis (01 Mar 2022 17:32)        HPI:  61 years old male with h/o ETOH abuse, pancreatitis, esophagitis, hep C, CKD 4, present to ED with complain of abd pain, N/V and coffee ground emesisi. Patient reported severe, 10/10, sharp/burning epigastric pain, associated with multiple episode of coffee ground emesis.   Tachycardic to 116, BP stable upon ED arrival, HR improve to 107 after IV fluid. EKG with sinus tachy, , QTc 468. WBC 21.46, Hb 12.2 ( was 7-8 range in 01/2022), plt 450, lactate 1.3, K 4.1, Cr 4.23 ( 3.37 on 02/09/2022), hsTnT 11.1. CXR image reviewed, no focal consolidation. CT abd/pelvis with no acute pathology    SH: former ETOH use (01 Mar 2022 11:44)      SUBJECTIVE & OBJECTIVE: Pt seen and examined at bedside.   no overnight events.   complaining of epigastric pain   no further coffee ground emesis     Denies fever, chills, N/V, dizziness, HA, cough, CP, palpitations, SOB,  dysuria, diarrhea, constipation.       PHYSICAL EXAM:  T(C): 36.7 (03-02-22 @ 05:02), Max: 37.6 (03-01-22 @ 17:39)  HR: 99 (03-02-22 @ 05:02) (71 - 104)  BP: 158/74 (03-02-22 @ 05:02) (126/77 - 166/71)  RR: 18 (03-02-22 @ 05:02) (16 - 20)  SpO2: 99% (03-02-22 @ 05:02) (96% - 100%)  Wt(kg): -- Height (cm): 185.4 (03-01 @ 17:39)  I&O's Detail    01 Mar 2022 07:01  -  02 Mar 2022 07:00  --------------------------------------------------------  IN:    Lactated Ringers: 1475 mL    Oral Fluid: 960 mL  Total IN: 2435 mL    OUT:    Emesis (mL): 130 mL    Voided (mL): 1180 mL  Total OUT: 1310 mL    Total NET: 1125 mL        GENERAL: NAD, no increased WOB  HEAD:  Atraumatic, Normocephalic  EYES: EOMI, PERRLA, conjunctiva and sclera clear  ENMT: Moist mucous membranes  NECK: Supple, No JVD  NERVOUS SYSTEM:  Alert & Oriented X3, no focal neuro deficits   CHEST/LUNG: Clear to auscultation bilaterally; No rales, rhonchi, wheezing, or rubs  HEART: Regular rate and rhythm; No murmurs, rubs, or gallops  ABDOMEN: Soft, epigastric tenderness on palpation,  Nondistended; Bowel sounds present, no guarding or rigidity   EXTREMITIES:  2+ Peripheral Pulses b/l, No clubbing, cyanosis, calf tenderness or edema b/l    MEDICATIONS  (STANDING):  calcium acetate 667 milliGRAM(s) Oral three times a day with meals  cyanocobalamin 1000 MICROGram(s) Oral daily  folic acid 1 milliGRAM(s) Oral daily  gabapentin 200 milliGRAM(s) Oral daily  lactated ringers. 1000 milliLiter(s) (125 mL/Hr) IV Continuous <Continuous>  multivitamin 1 Tablet(s) Oral daily  pancrelipase  (CREON  6,000 Lipase Units) 2 Capsule(s) Oral three times a day with meals  pantoprazole  Injectable 40 milliGRAM(s) IV Push every 12 hours  senna 2 Tablet(s) Oral at bedtime  sodium bicarbonate 650 milliGRAM(s) Oral every 6 hours  sucralfate suspension 1 Gram(s) Oral four times a day  tamsulosin 0.4 milliGRAM(s) Oral at bedtime  thiamine 100 milliGRAM(s) Oral daily    MEDICATIONS  (PRN):  acetaminophen     Tablet .. 650 milliGRAM(s) Oral every 6 hours PRN Mild Pain (1 - 3), Moderate Pain (4 - 6)  aluminum hydroxide/magnesium hydroxide/simethicone Suspension 30 milliLiter(s) Oral every 4 hours PRN Dyspepsia  melatonin 3 milliGRAM(s) Oral at bedtime PRN Insomnia  ondansetron Injectable 4 milliGRAM(s) IV Push every 8 hours PRN Nausea and/or Vomiting  oxyCODONE    IR 5 milliGRAM(s) Oral every 6 hours PRN Severe Pain (7 - 10)      LABS:                        8.5    16.39 )-----------( 291      ( 02 Mar 2022 08:15 )             28.0     03-02    147<H>  |  119<H>  |  39<H>  ----------------------------<  83  3.6   |  21<L>  |  3.63<H>    Ca    8.7      02 Mar 2022 08:15  Phos  3.1     03-02  Mg     2.3     03-01    TPro  6.1  /  Alb  2.5<L>  /  TBili  0.2  /  DBili  x   /  AST  6<L>  /  ALT  8<L>  /  AlkPhos  77  03-02        Magnesium, Serum: 2.3 mg/dL (03-01 @ 18:34)    CAPILLARY BLOOD GLUCOSE                RECENT CULTURES:      RADIOLOGY & ADDITIONAL TESTS:  < from: Xray Chest 1 View- PORTABLE-Routine (Xray Chest 1 View- PORTABLE-Routine .) (03.01.22 @ 06:25) >  INTERPRETATION:  AP semierect chest on March 1, 2022 at 6:13 AM. 2   images. Patient has chest pain.    Heart size is within normal limits.    The lung fields and pleural surfaces are unremarkable.    Chest is similar to January 26 of this year.    IMPRESSION: No acute finding or change.    < end of copied text >    < from: CT Abdomen and Pelvis No Cont (03.01.22 @ 10:29) >    ACC: [id number] EXAM:  CT ABDOMEN AND PELVIS                          PROCEDURE DATE:  03/01/2022          INTERPRETATION:  CLINICAL INFORMATION: Epigastric abdominal pain    COMPARISON: 1/25/2022    CONTRAST/COMPLICATIONS:  IV Contrast: None  Oral Contrast: None  Complications: None    PROCEDURE:  CT of the Abdomen and Pelvis was performed.  Sagittal and coronal reformats were performed.    FINDINGS:  LOWER CHEST: Clear lung bases.    Please note that evaluation of the abdominal organs and vascular   structures is limited by lack of intravenous contrast.    LIVER: Within normal limits.  BILE DUCTS: Normal caliber.  GALLBLADDER: Within normal limits.  SPLEEN: Within normal limits.  PANCREAS: Coarse pancreatic calcifications. Stable pancreaticductal   dilatation measuring up to 1.6 cm.  ADRENALS: Within normal limits.  KIDNEYS/URETERS: Left renal hypodensity, possibly cyst. No hydronephrosis.    BLADDER: Within normal limits.  REPRODUCTIVE ORGANS: Prostate is mildly enlarged.    BOWEL: Nobowel obstruction. Appendix is within normal limits.  PERITONEUM: No ascites.  VESSELS: Atherosclerotic calcifications.  RETROPERITONEUM/LYMPH NODES: No lymphadenopathy.  ABDOMINAL WALL: Postsurgical changes.  BONES: Degenerative changes. Left sacroiliac fusion. Stable left iliac   lytic lesion.    IMPRESSION:  Etiology of abdominal pain is not elucidated.    < end of copied text >          Imaging Personally Reviewed:  [ ] YES  [ ] NO    Consultant(s) Notes Reviewed:  [x ] YES  [ ] NO    Care Discussed with Consultants/Other Providers [x ] YES  [ ] NO

## 2022-03-02 NOTE — CONSULT NOTE ADULT - SUBJECTIVE AND OBJECTIVE BOX
Patient chart reviewed, full consult to follow.     Thank you for the courtesy of this consultation. [facility] NEPHROLOGY SERVICES, [facility]  NEPHROLOGY AND HYPERTENSION  [address]  [address]  [place] [address]  [phone]    MD HALIE OLIVIA MD ANDREY GONCHARUK, MD MADHU KORRAPATI, MD YELENA ROSENBERG, MD BINNY KOSHY, MD CHRISTOPHER CAPUTO, MD EDWARD BOVER, MD      Information from chart:  "Patient is a 61y old  Male who presents with a chief complaint of coffee ground emesis (02 Mar 2022 11:33)    HPI:  61 years old male with h/o ETOH abuse, pancreatitis, esophagitis, hep C, CKD 4, present to ED with complain of abd pain, N/V and coffee ground emesisi. Patient reported severe, 10/10, sharp/burning epigastric pain, associated with multiple episode of coffee ground emesis.   Tachycardic to 116, BP stable upon ED arrival, HR improve to 107 after IV fluid. EKG with sinus tachy, , QTc 468. WBC 21.46, Hb 12.2 ( was 7-8 range in 01/2022), plt 450, lactate 1.3, K 4.1, Cr 4.23 ( 3.37 on 02/09/2022), hsTnT 11.1. CXR image reviewed, no focal consolidation. CT abd/pelvis with no acute pathology    Patient with abd discomfort;   No sob  Known to me office patient; CKD 4-5     PAST MEDICAL & SURGICAL HISTORY:  ETOH abuse    Pancreatitis    Hepatitis C  treated    Gout    Gastric perforation      FAMILY HISTORY:  FH: HTN (hypertension)      Allergies    No Known Allergies    Intolerances      Home Medications:    MEDICATIONS  (STANDING):  calcium acetate 667 milliGRAM(s) Oral three times a day with meals  cyanocobalamin 1000 MICROGram(s) Oral daily  folic acid 1 milliGRAM(s) Oral daily  gabapentin 200 milliGRAM(s) Oral daily  lactated ringers. 1000 milliLiter(s) (75 mL/Hr) IV Continuous <Continuous>  metoclopramide 10 milliGRAM(s) Oral four times a day  multivitamin 1 Tablet(s) Oral daily  pancrelipase  (CREON  6,000 Lipase Units) 2 Capsule(s) Oral three times a day with meals  pantoprazole  Injectable 40 milliGRAM(s) IV Push every 12 hours  senna 2 Tablet(s) Oral at bedtime  sodium bicarbonate 650 milliGRAM(s) Oral every 6 hours  sucralfate suspension 1 Gram(s) Oral four times a day  tamsulosin 0.4 milliGRAM(s) Oral at bedtime  thiamine 100 milliGRAM(s) Oral daily    MEDICATIONS  (PRN):  acetaminophen     Tablet .. 650 milliGRAM(s) Oral every 6 hours PRN Mild Pain (1 - 3)  aluminum hydroxide/magnesium hydroxide/simethicone Suspension 30 milliLiter(s) Oral every 4 hours PRN Dyspepsia  LORazepam   Injectable 2 milliGRAM(s) IV Push every 4 hours PRN if CIWA >8 , notify MD  melatonin 3 milliGRAM(s) Oral at bedtime PRN Insomnia  morphine  - Injectable 2 milliGRAM(s) IV Push every 4 hours PRN Severe Pain (7 - 10)  ondansetron Injectable 4 milliGRAM(s) IV Push every 8 hours PRN Nausea and/or Vomiting  oxyCODONE    IR 5 milliGRAM(s) Oral every 6 hours PRN Moderate Pain (4 - 6)    Vital Signs Last 24 Hrs  T(C): 36.9 (02 Mar 2022 17:10), Max: 37.2 (01 Mar 2022 23:12)  T(F): 98.5 (02 Mar 2022 17:10), Max: 99 (01 Mar 2022 23:12)  HR: 100 (02 Mar 2022 17:10) (98 - 104)  BP: 154/93 (02 Mar 2022 17:10) (126/77 - 159/78)  BP(mean): --  RR: 18 (02 Mar 2022 17:10) (17 - 18)  SpO2: 99% (02 Mar 2022 17:10) (98% - 99%)    Daily     Daily     03-01-22 @ 07:01  -  03-02-22 @ 07:00  --------------------------------------------------------  IN: 2435 mL / OUT: 1310 mL / NET: 1125 mL    03-02-22 @ 07:01  -  03-02-22 @ 19:23  --------------------------------------------------------  IN: 900 mL / OUT: 800 mL / NET: 100 mL      CAPILLARY BLOOD GLUCOSE        PHYSICAL EXAM:      T(C): 36.9 (03-02-22 @ 17:10), Max: 37.2 (03-01-22 @ 23:12)  HR: 100 (03-02-22 @ 17:10) (98 - 104)  BP: 154/93 (03-02-22 @ 17:10) (126/77 - 159/78)  RR: 18 (03-02-22 @ 17:10) (17 - 18)  SpO2: 99% (03-02-22 @ 17:10) (98% - 99%)  Wt(kg): --  Lungs clear  Heart S1S2  Abd soft NT ND  Extremities:   tr edema              03-02    147<H>  |  119<H>  |  39<H>  ----------------------------<  86  3.6   |  21<L>  |  3.61<H>    Ca    8.8      02 Mar 2022 12:30  Phos  3.1     03-02  Mg     1.9     03-02    TPro  6.1  /  Alb  2.5<L>  /  TBili  0.2  /  DBili  x   /  AST  6<L>  /  ALT  8<L>  /  AlkPhos  77  03-02                          8.5    16.39 )-----------( 291      ( 02 Mar 2022 08:15 )             28.0     Creatinine Trend: 3.61<--, 3.63<--, 3.82<--, 4.23<--, 3.37<--, 3.14<--    Trend Bun/Cr  03-02-22 @ 12:30  BUN/CR -  39<H> / 3.61<H>  03-02-22 @ 08:15  BUN/CR -  39<H> / 3.63<H>  03-01-22 @ 18:34  BUN/CR -  41<H> / 3.82<H>  03-01-22 @ 07:00  BUN/CR -  47<H> / 4.23<H>  02-09-22 @ 17:49  BUN/CR -  33<H> / 3.37<H>  02-02-22 @ 06:27  BUN/CR -  23 / 3.14<H>  02-01-22 @ 06:29  BUN/CR -  23 / 3.14<H>  01-31-22 @ 07:50  BUN/CR -  25<H> / 3.09<H>  01-30-22 @ 06:35  BUN/CR -  24<H> / 3.19<H>  01-29-22 @ 06:12  BUN/CR -  30<H> / 3.23<H>  01-28-22 @ 06:54  BUN/CR -  34<H> / 3.57<H>  01-27-22 @ 13:09  BUN/CR -  40<H> / 3.65<H>        Assessment   YUNIEL CKD 4; pre renal azotemia;   Improving     Plan  IVF until po adequate  Venofer IV  Follow Fe profile, hx of deficiency    Austin Dukes MD

## 2022-03-02 NOTE — PROGRESS NOTE ADULT - SUBJECTIVE AND OBJECTIVE BOX
Pt with slight drop in H/H; no active bleeding  +ETOH abuse  Still with some mid epigastric abd pain      MEDICATIONS  (STANDING):  calcium acetate 667 milliGRAM(s) Oral three times a day with meals  cyanocobalamin 1000 MICROGram(s) Oral daily  folic acid 1 milliGRAM(s) Oral daily  gabapentin 200 milliGRAM(s) Oral daily  multivitamin 1 Tablet(s) Oral daily  pancrelipase  (CREON  6,000 Lipase Units) 2 Capsule(s) Oral three times a day with meals  pantoprazole  Injectable 40 milliGRAM(s) IV Push every 12 hours  senna 2 Tablet(s) Oral at bedtime  sodium bicarbonate 650 milliGRAM(s) Oral every 6 hours  sucralfate suspension 1 Gram(s) Oral four times a day  tamsulosin 0.4 milliGRAM(s) Oral at bedtime  thiamine 100 milliGRAM(s) Oral daily    MEDICATIONS  (PRN):  acetaminophen     Tablet .. 650 milliGRAM(s) Oral every 6 hours PRN Mild Pain (1 - 3), Moderate Pain (4 - 6)  aluminum hydroxide/magnesium hydroxide/simethicone Suspension 30 milliLiter(s) Oral every 4 hours PRN Dyspepsia  melatonin 3 milliGRAM(s) Oral at bedtime PRN Insomnia  morphine  - Injectable 2 milliGRAM(s) IV Push every 4 hours PRN Severe Pain (7 - 10)  ondansetron Injectable 4 milliGRAM(s) IV Push every 8 hours PRN Nausea and/or Vomiting  oxyCODONE    IR 5 milliGRAM(s) Oral every 6 hours PRN Severe Pain (7 - 10)      Allergies    No Known Allergies    Intolerances        Vital Signs Last 24 Hrs  T(C): 36.8 (02 Mar 2022 11:15), Max: 37.6 (01 Mar 2022 17:39)  T(F): 98.3 (02 Mar 2022 11:15), Max: 99.7 (01 Mar 2022 17:39)  HR: 98 (02 Mar 2022 11:15) (71 - 104)  BP: 159/78 (02 Mar 2022 11:15) (126/77 - 166/71)  BP(mean): --  RR: 17 (02 Mar 2022 11:15) (16 - 20)  SpO2: 98% (02 Mar 2022 11:15) (96% - 100%)    PHYSICAL EXAM:  General: NAD.  CVS: S1, S2  Chest: air entry bilaterally present  Abd: BS present, soft, non-tender      LABS:                        8.5    16.39 )-----------( 291      ( 02 Mar 2022 08:15 )             28.0     03-02    147<H>  |  119<H>  |  39<H>  ----------------------------<  83  3.6   |  21<L>  |  3.63<H>    Ca    8.7      02 Mar 2022 08:15  Phos  3.1     03-02  Mg     1.9     03-02    TPro  6.1  /  Alb  2.5<L>  /  TBili  0.2  /  DBili  x   /  AST  6<L>  /  ALT  8<L>  /  AlkPhos  77  03-02        follow labs  continue protonix and carafate  Full fluids diet

## 2022-03-02 NOTE — PROGRESS NOTE ADULT - ASSESSMENT
61 years old male with h/o ETOH abuse, pancreatitis, esophagitis, hep C, CKD 4, present to ED with complain of abd pain, N/V and coffee ground emesis.     Assessment and Plan:   61 years old male with h/o ETOH abuse, pancreatitis, esophagitis, hep C, CKD 4, present to ED with complain of abd pain, N/V and coffee ground emesis.     Assessment and Plan:    # Coffee ground emesis, ETOH-gastritis/esophagitis , nausea   EGD on 12/12 which reveal esophagitis and gastritis.    Abd US 2019: showing fatty liver   CTAP without contrast--no acute findings   -d/c IVF for now   FOBT neg   -zofran prn, maalox prn   -PPI IV bid, sucralfate   -full liquid diet   pain control prn   GI consult appreciated , possibly will need EGD     #leukocytosis , most likely reactive and d/t hemoconcentration   monitor     #Normocytic anemia   monitor H/H   maintain active T&S, tranfuse if Hgb <7 or if patient symptomatic   follow anemia labs       # Chronic pancreatitis.    chronic ETOH abuse in the past  CTAP as above   lipase ok   continue with creon     #h/o ETOH abuse   -CIWA protocol , symptom trigger ativan prn   monitor for w/d   -folic acid/MVI/thiamine       # history of Hepatitis C virus infection   - hx of treatment in the past    #CKD 4   metabolic acidosis in setting of CKD   - avoid nephrotoxic meds, dose all meds per eGFR   monitor Cr   Nephrology consult appreciated     calcium acetate, NaHCO3.      #Preventative measures   -SCDs-dvt ppx  general precautions

## 2022-03-03 LAB
ALBUMIN SERPL ELPH-MCNC: 2.2 G/DL — LOW (ref 3.3–5)
ALP SERPL-CCNC: 70 U/L — SIGNIFICANT CHANGE UP (ref 40–120)
ALT FLD-CCNC: 7 U/L — LOW (ref 12–78)
ANION GAP SERPL CALC-SCNC: 4 MMOL/L — LOW (ref 5–17)
AST SERPL-CCNC: 8 U/L — LOW (ref 15–37)
BILIRUB DIRECT SERPL-MCNC: <0.05 — SIGNIFICANT CHANGE UP (ref 0–0.3)
BILIRUB INDIRECT FLD-MCNC: SIGNIFICANT CHANGE UP (ref 0.2–1)
BILIRUB SERPL-MCNC: 0.1 MG/DL — LOW (ref 0.2–1.2)
BUN SERPL-MCNC: 34 MG/DL — HIGH (ref 7–23)
CALCIUM SERPL-MCNC: 8.2 MG/DL — LOW (ref 8.5–10.1)
CHLORIDE SERPL-SCNC: 117 MMOL/L — HIGH (ref 96–108)
CO2 SERPL-SCNC: 23 MMOL/L — SIGNIFICANT CHANGE UP (ref 22–31)
CREAT SERPL-MCNC: 3.15 MG/DL — HIGH (ref 0.5–1.3)
EGFR: 21 ML/MIN/1.73M2 — LOW
FERRITIN SERPL-MCNC: 31 NG/ML — SIGNIFICANT CHANGE UP (ref 30–400)
FOLATE SERPL-MCNC: >20 NG/ML — SIGNIFICANT CHANGE UP
GLUCOSE SERPL-MCNC: 108 MG/DL — HIGH (ref 70–99)
HCT VFR BLD CALC: 24.3 % — LOW (ref 39–50)
HCT VFR BLD CALC: 28.6 % — LOW (ref 39–50)
HGB BLD-MCNC: 7.4 G/DL — LOW (ref 13–17)
HGB BLD-MCNC: 8.9 G/DL — LOW (ref 13–17)
IRON SATN MFR SERPL: 14 UG/DL — LOW (ref 45–165)
IRON SATN MFR SERPL: 6 % — LOW (ref 16–55)
MAGNESIUM SERPL-MCNC: 2 MG/DL — SIGNIFICANT CHANGE UP (ref 1.6–2.6)
MCHC RBC-ENTMCNC: 26.1 PG — LOW (ref 27–34)
MCHC RBC-ENTMCNC: 27.1 PG — SIGNIFICANT CHANGE UP (ref 27–34)
MCHC RBC-ENTMCNC: 30.5 G/DL — LOW (ref 32–36)
MCHC RBC-ENTMCNC: 31.1 G/DL — LOW (ref 32–36)
MCV RBC AUTO: 85.9 FL — SIGNIFICANT CHANGE UP (ref 80–100)
MCV RBC AUTO: 86.9 FL — SIGNIFICANT CHANGE UP (ref 80–100)
NRBC # BLD: 0 /100 WBCS — SIGNIFICANT CHANGE UP (ref 0–0)
NRBC # BLD: 0 /100 WBCS — SIGNIFICANT CHANGE UP (ref 0–0)
PHOSPHATE SERPL-MCNC: 2.6 MG/DL — SIGNIFICANT CHANGE UP (ref 2.5–4.5)
PLATELET # BLD AUTO: 281 K/UL — SIGNIFICANT CHANGE UP (ref 150–400)
PLATELET # BLD AUTO: 297 K/UL — SIGNIFICANT CHANGE UP (ref 150–400)
POTASSIUM SERPL-MCNC: 3.3 MMOL/L — LOW (ref 3.5–5.3)
POTASSIUM SERPL-SCNC: 3.3 MMOL/L — LOW (ref 3.5–5.3)
PROT SERPL-MCNC: 5.6 GM/DL — LOW (ref 6–8.3)
RBC # BLD: 2.83 M/UL — LOW (ref 4.2–5.8)
RBC # BLD: 3.29 M/UL — LOW (ref 4.2–5.8)
RBC # FLD: 22.4 % — HIGH (ref 10.3–14.5)
RBC # FLD: 24.1 % — HIGH (ref 10.3–14.5)
SODIUM SERPL-SCNC: 144 MMOL/L — SIGNIFICANT CHANGE UP (ref 135–145)
TIBC SERPL-MCNC: 213 UG/DL — LOW (ref 220–430)
TSH SERPL-MCNC: 1.85 UU/ML — SIGNIFICANT CHANGE UP (ref 0.36–3.74)
UIBC SERPL-MCNC: 199 UG/DL — SIGNIFICANT CHANGE UP (ref 110–370)
VIT B12 SERPL-MCNC: 1811 PG/ML — HIGH (ref 232–1245)
WBC # BLD: 10.13 K/UL — SIGNIFICANT CHANGE UP (ref 3.8–10.5)
WBC # BLD: 10.34 K/UL — SIGNIFICANT CHANGE UP (ref 3.8–10.5)
WBC # FLD AUTO: 10.13 K/UL — SIGNIFICANT CHANGE UP (ref 3.8–10.5)
WBC # FLD AUTO: 10.34 K/UL — SIGNIFICANT CHANGE UP (ref 3.8–10.5)

## 2022-03-03 PROCEDURE — 99232 SBSQ HOSP IP/OBS MODERATE 35: CPT

## 2022-03-03 RX ORDER — POTASSIUM CHLORIDE 20 MEQ
40 PACKET (EA) ORAL ONCE
Refills: 0 | Status: COMPLETED | OUTPATIENT
Start: 2022-03-03 | End: 2022-03-03

## 2022-03-03 RX ORDER — IRON SUCROSE 20 MG/ML
100 INJECTION, SOLUTION INTRAVENOUS EVERY 24 HOURS
Refills: 0 | Status: DISCONTINUED | OUTPATIENT
Start: 2022-03-03 | End: 2022-03-05

## 2022-03-03 RX ORDER — MORPHINE SULFATE 50 MG/1
3 CAPSULE, EXTENDED RELEASE ORAL EVERY 4 HOURS
Refills: 0 | Status: DISCONTINUED | OUTPATIENT
Start: 2022-03-03 | End: 2022-03-05

## 2022-03-03 RX ADMIN — MORPHINE SULFATE 2 MILLIGRAM(S): 50 CAPSULE, EXTENDED RELEASE ORAL at 06:34

## 2022-03-03 RX ADMIN — Medication 1 GRAM(S): at 11:11

## 2022-03-03 RX ADMIN — Medication 650 MILLIGRAM(S): at 18:06

## 2022-03-03 RX ADMIN — MORPHINE SULFATE 2 MILLIGRAM(S): 50 CAPSULE, EXTENDED RELEASE ORAL at 11:20

## 2022-03-03 RX ADMIN — MORPHINE SULFATE 3 MILLIGRAM(S): 50 CAPSULE, EXTENDED RELEASE ORAL at 21:17

## 2022-03-03 RX ADMIN — Medication 667 MILLIGRAM(S): at 11:12

## 2022-03-03 RX ADMIN — Medication 1 GRAM(S): at 06:00

## 2022-03-03 RX ADMIN — Medication 1 GRAM(S): at 23:34

## 2022-03-03 RX ADMIN — MORPHINE SULFATE 2 MILLIGRAM(S): 50 CAPSULE, EXTENDED RELEASE ORAL at 03:05

## 2022-03-03 RX ADMIN — Medication 667 MILLIGRAM(S): at 07:54

## 2022-03-03 RX ADMIN — Medication 650 MILLIGRAM(S): at 23:34

## 2022-03-03 RX ADMIN — SODIUM CHLORIDE 65 MILLILITER(S): 9 INJECTION, SOLUTION INTRAVENOUS at 08:30

## 2022-03-03 RX ADMIN — MORPHINE SULFATE 2 MILLIGRAM(S): 50 CAPSULE, EXTENDED RELEASE ORAL at 18:05

## 2022-03-03 RX ADMIN — IRON SUCROSE 210 MILLIGRAM(S): 20 INJECTION, SOLUTION INTRAVENOUS at 13:59

## 2022-03-03 RX ADMIN — MORPHINE SULFATE 2 MILLIGRAM(S): 50 CAPSULE, EXTENDED RELEASE ORAL at 11:10

## 2022-03-03 RX ADMIN — TAMSULOSIN HYDROCHLORIDE 0.4 MILLIGRAM(S): 0.4 CAPSULE ORAL at 21:16

## 2022-03-03 RX ADMIN — SENNA PLUS 2 TABLET(S): 8.6 TABLET ORAL at 21:16

## 2022-03-03 RX ADMIN — MORPHINE SULFATE 2 MILLIGRAM(S): 50 CAPSULE, EXTENDED RELEASE ORAL at 07:04

## 2022-03-03 RX ADMIN — Medication 40 MILLIEQUIVALENT(S): at 11:10

## 2022-03-03 RX ADMIN — Medication 10 MILLIGRAM(S): at 18:06

## 2022-03-03 RX ADMIN — Medication 10 MILLIGRAM(S): at 23:34

## 2022-03-03 RX ADMIN — Medication 10 MILLIGRAM(S): at 06:00

## 2022-03-03 RX ADMIN — Medication 2 CAPSULE(S): at 18:06

## 2022-03-03 RX ADMIN — Medication 100 MILLIGRAM(S): at 11:10

## 2022-03-03 RX ADMIN — Medication 650 MILLIGRAM(S): at 11:10

## 2022-03-03 RX ADMIN — GABAPENTIN 200 MILLIGRAM(S): 400 CAPSULE ORAL at 11:10

## 2022-03-03 RX ADMIN — Medication 2 CAPSULE(S): at 07:54

## 2022-03-03 RX ADMIN — Medication 667 MILLIGRAM(S): at 18:06

## 2022-03-03 RX ADMIN — PANTOPRAZOLE SODIUM 40 MILLIGRAM(S): 20 TABLET, DELAYED RELEASE ORAL at 18:06

## 2022-03-03 RX ADMIN — Medication 1 TABLET(S): at 11:11

## 2022-03-03 RX ADMIN — PANTOPRAZOLE SODIUM 40 MILLIGRAM(S): 20 TABLET, DELAYED RELEASE ORAL at 06:00

## 2022-03-03 RX ADMIN — Medication 1 MILLIGRAM(S): at 11:10

## 2022-03-03 RX ADMIN — PREGABALIN 1000 MICROGRAM(S): 225 CAPSULE ORAL at 11:11

## 2022-03-03 RX ADMIN — Medication 650 MILLIGRAM(S): at 06:00

## 2022-03-03 RX ADMIN — MORPHINE SULFATE 3 MILLIGRAM(S): 50 CAPSULE, EXTENDED RELEASE ORAL at 21:47

## 2022-03-03 RX ADMIN — MORPHINE SULFATE 2 MILLIGRAM(S): 50 CAPSULE, EXTENDED RELEASE ORAL at 02:35

## 2022-03-03 RX ADMIN — MORPHINE SULFATE 2 MILLIGRAM(S): 50 CAPSULE, EXTENDED RELEASE ORAL at 15:51

## 2022-03-03 RX ADMIN — IRON SUCROSE 200 MILLIGRAM(S): 20 INJECTION, SOLUTION INTRAVENOUS at 11:18

## 2022-03-03 RX ADMIN — Medication 2 CAPSULE(S): at 11:12

## 2022-03-03 RX ADMIN — Medication 1 GRAM(S): at 18:06

## 2022-03-03 RX ADMIN — ONDANSETRON 4 MILLIGRAM(S): 8 TABLET, FILM COATED ORAL at 11:10

## 2022-03-03 NOTE — CHART NOTE - NSCHARTNOTEFT_GEN_A_CORE
Was called by RN due to patient wanting to sign out AMA. Asked patient why he wanted to leave, reports he does not want to be on a liquid diet and if he gets a liquid diet he is leaving.    Patient is A&Ox3. I explained at length to the patient the risks of signing out AMA including but not limited to harm, injury or death. I explained the risks of refusing treatment at this time. I offered to answer any questions and fully answered any such questions. We believe that the patient fully understands what has been explained and answered. I informed Dr. Anaya of this. Patient signed AMA form witnessed by RN and accepts responsibility for any and all results of this decision.

## 2022-03-03 NOTE — PROGRESS NOTE ADULT - ASSESSMENT
61 years old male with h/o ETOH abuse, pancreatitis, esophagitis, hep C, CKD 4, present to ED with complaint of abd pain, N/V and coffee ground emesis.     Assessment and Plan:    # Coffee ground emesis, ETOH-gastritis/esophagitis , nausea   EGD on 12/12 which reveal esophagitis and gastritis.    Abd US 2019: showing fatty liver   CTAP without contrast--no acute findings   -d/c IVF for now   FOBT neg   utox + opiates (given here)   -zofran prn, maalox prn   -PPI IV bid, sucralfate , reglan OTC   pain control prn   GI following , possibly will need EGD   3/3 tolerated regular diet. hgb 7.4. suspect intravascularly depleted on arrival. doubt active bleeding. will transfuse 1u PRBC today.   awaiting GI decision on EGD/colonoscopy?      #leukocytosis , most likely reactive and d/t hemoconcentration   WBC normalized     #Iron deficiency anemia   -3/3 s/p venofer x 1 , 1u PRBC   monitor H/H   anemia labs noted   folic acid, vit B12 wnl   obtain TSH         # Chronic pancreatitis.    chronic ETOH abuse in the past  CTAP as above   lipase ok   continue with creon     #h/o ETOH abuse , not in w/d   -CIWA protocol , symptom trigger ativan prn   monitor for w/d   -folic acid/MVI/thiamine       # history of Hepatitis C virus infection   - hx of treatment in the past    #YUNIEL on CKD 4   metabolic acidosis in setting of CKD   - avoid nephrotoxic meds, dose all meds per eGFR    calcium acetate, NaHCO3.  Cr improved with IVF   Nephrology following     #Hypokalemia --repleted         #Preventative measures   -SCDs-dvt ppx  fall precautions   PT eval  61 years old male with h/o ETOH abuse, pancreatitis, esophagitis, hep C, CKD 4, present to ED with complaint of abd pain, N/V and coffee ground emesis.     Assessment and Plan:    # Coffee ground emesis, ETOH-gastritis/esophagitis , nausea   EGD on 12/12 which reveal esophagitis and gastritis.    Abd US 2019: showing fatty liver   CTAP without contrast--no acute findings   FOBT neg   utox + opiates (given here)   -zofran prn, maalox prn   -PPI IV bid, sucralfate , reglan OTC   pain control prn   GI following , possibly will need EGD   3/3 tolerated regular diet. hgb 7.4. suspect intravascularly depleted on arrival. doubt active bleeding. will transfuse 1u PRBC today.   awaiting GI decision on EGD/colonoscopy?    continue with D5/0.45NS    #leukocytosis , most likely reactive and d/t hemoconcentration   WBC normalized     #Iron deficiency anemia   -3/3 s/p venofer x 1 , 1u PRBC   monitor H/H   anemia labs noted   folic acid, vit B12 wnl   obtain TSH         # Chronic pancreatitis.    chronic ETOH abuse in the past  CTAP as above   lipase ok   continue with creon     #h/o ETOH abuse , not in w/d   -CIWA protocol , symptom trigger ativan prn   monitor for w/d   -folic acid/MVI/thiamine       # history of Hepatitis C virus infection   - hx of treatment in the past    #YUNIEL on CKD 4   metabolic acidosis in setting of CKD   - avoid nephrotoxic meds, dose all meds per eGFR    calcium acetate, NaHCO3.  Cr improved with IVF   Nephrology following     #Hypokalemia --repleted         #Preventative measures   -SCDs-dvt ppx  fall precautions   PT eval  61 years old male with h/o ETOH abuse, pancreatitis, esophagitis, hep C, CKD 4, present to ED with complaint of abd pain, N/V and coffee ground emesis.     Assessment and Plan:    # Coffee ground emesis, ETOH-gastritis/esophagitis , nausea   Abd US 2019: showing fatty liver   CTAP without contrast--no acute findings   FOBT neg   utox + opiates (given here)   EGD path reports reviewed for 1/28/22 and 12/2021 showing mainly Chr gastritis , Negative for intestinal metaplasia, neg for H. pylori or fungal organisms ; small GE junction ulcer and esophagitis   -zofran prn, maalox prn   -PPI IV bid, sucralfate , reglan OTC   pain control prn   GI following , possibly will need EGD   3/3 tolerated regular diet. hgb 7.4. suspect intravascularly depleted on arrival. doubt active bleeding. will transfuse 1u PRBC today.   discussed with GI, no plan for EGD inpatient, as recent EGD 1/28/22 reviewed. Patient will need outpatient colonoscopy, discussed at length with patient and teach back method applied   continue with D5/0.45NS    #leukocytosis , most likely reactive and d/t hemoconcentration   WBC normalized     #Iron deficiency anemia   -3/3 s/p venofer x 1 , 1u PRBC   monitor H/H   anemia labs noted   folic acid, vit B12 wnl   obtain TSH         # Chronic pancreatitis.    chronic ETOH abuse in the past  CTAP as above   lipase ok   continue with creon     #h/o ETOH abuse , not in w/d   -CIWA protocol , symptom trigger ativan prn   monitor for w/d   -folic acid/MVI/thiamine       # history of Hepatitis C virus infection   - hx of treatment in the past    #YUNIEL on CKD 4   metabolic acidosis in setting of CKD   - avoid nephrotoxic meds, dose all meds per eGFR    calcium acetate, NaHCO3.  Cr improved with IVF   Nephrology following     #Hypokalemia --repleted         #Preventative measures   -SCDs-dvt ppx  fall precautions   PT eval

## 2022-03-03 NOTE — PROGRESS NOTE ADULT - SUBJECTIVE AND OBJECTIVE BOX
NEPHROLOGY PROGRESS NOTE    CHIEF COMPLAINT:  YUNIEL/CKD    HPI:  Renal function improving on IVF.    ROS:  coughing alot      EXAM:  T(F): 97.8 (03-03-22 @ 11:08)  HR: 91 (03-03-22 @ 11:08)  BP: 159/77 (03-03-22 @ 11:08)  RR: 18 (03-03-22 @ 11:08)  SpO2: 100% (03-03-22 @ 11:08)    Conversant, in no apparent distress  Normal respiratory effort, lungs clear bilaterally  Heart RRR with no murmur, no peripheral edema         LABS                             7.4    10.34 )-----------( 297      ( 03 Mar 2022 06:34 )             24.3          03-03    144  |  117<H>  |  34<H>  ----------------------------<  108<H>  3.3<L>   |  23  |  3.15<H>    Ca    8.2<L>      03 Mar 2022 06:34  Phos  2.6     03-03  Mg     2.0     03-03    TPro  5.6<L>  /  Alb  2.2<L>  /  TBili  0.1<L>  /  DBili  <0.05  /  AST  8<L>  /  ALT  7<L>  /  AlkPhos  70  03-03           TSAT 6  Ferritin 31      Assessment   YUNIEL CKD 4; pre renal azotemia, improving  Fe deficiency anemai    Plan  IVF until po adequate  Venofer IV

## 2022-03-03 NOTE — PROGRESS NOTE ADULT - SUBJECTIVE AND OBJECTIVE BOX
Pt still c/o hiccups  Pt has had numerous endoscopies; otherwise stable      MEDICATIONS  (STANDING):  calcium acetate 667 milliGRAM(s) Oral three times a day with meals  cyanocobalamin 1000 MICROGram(s) Oral daily  dextrose 5% + sodium chloride 0.45%. 1000 milliLiter(s) (65 mL/Hr) IV Continuous <Continuous>  folic acid 1 milliGRAM(s) Oral daily  gabapentin 200 milliGRAM(s) Oral daily  iron sucrose IVPB 100 milliGRAM(s) IV Intermittent every 24 hours  metoclopramide 10 milliGRAM(s) Oral four times a day  multivitamin 1 Tablet(s) Oral daily  pancrelipase  (CREON  6,000 Lipase Units) 2 Capsule(s) Oral three times a day with meals  pantoprazole  Injectable 40 milliGRAM(s) IV Push every 12 hours  senna 2 Tablet(s) Oral at bedtime  sodium bicarbonate 650 milliGRAM(s) Oral every 6 hours  sucralfate suspension 1 Gram(s) Oral four times a day  tamsulosin 0.4 milliGRAM(s) Oral at bedtime  thiamine 100 milliGRAM(s) Oral daily    MEDICATIONS  (PRN):  acetaminophen     Tablet .. 650 milliGRAM(s) Oral every 6 hours PRN Mild Pain (1 - 3)  aluminum hydroxide/magnesium hydroxide/simethicone Suspension 30 milliLiter(s) Oral every 4 hours PRN Dyspepsia  LORazepam   Injectable 2 milliGRAM(s) IV Push every 4 hours PRN if CIWA >8 , notify MD  melatonin 3 milliGRAM(s) Oral at bedtime PRN Insomnia  morphine  - Injectable 2 milliGRAM(s) IV Push every 4 hours PRN Severe Pain (7 - 10)  ondansetron Injectable 4 milliGRAM(s) IV Push every 8 hours PRN Nausea and/or Vomiting  oxyCODONE    IR 5 milliGRAM(s) Oral every 6 hours PRN Moderate Pain (4 - 6)      Allergies    No Known Allergies    Intolerances        Vital Signs Last 24 Hrs  T(C): 36.6 (03 Mar 2022 11:08), Max: 37 (03 Mar 2022 05:45)  T(F): 97.8 (03 Mar 2022 11:08), Max: 98.6 (03 Mar 2022 05:45)  HR: 91 (03 Mar 2022 11:08) (86 - 100)  BP: 159/77 (03 Mar 2022 11:08) (120/63 - 159/77)  BP(mean): --  RR: 18 (03 Mar 2022 11:08) (18 - 18)  SpO2: 100% (03 Mar 2022 11:08) (99% - 100%)    PHYSICAL EXAM:  General: NAD.  CVS: S1, S2  Chest: air entry bilaterally present  Abd: BS present, soft, non-tender      LABS:                        7.4    10.34 )-----------( 297      ( 03 Mar 2022 06:34 )             24.3     03-03    144  |  117<H>  |  34<H>  ----------------------------<  108<H>  3.3<L>   |  23  |  3.15<H>    Ca    8.2<L>      03 Mar 2022 06:34  Phos  2.6     03-03  Mg     2.0     03-03    TPro  5.6<L>  /  Alb  2.2<L>  /  TBili  0.1<L>  /  DBili  <0.05  /  AST  8<L>  /  ALT  7<L>  /  AlkPhos  70  03-03        continue present meds  will need outpatient f/u with prior GI

## 2022-03-03 NOTE — PROGRESS NOTE ADULT - SUBJECTIVE AND OBJECTIVE BOX
Patient is a 61y old  Male who presents with a chief complaint of coffee ground emesis (01 Mar 2022 17:32)        HPI:  61 years old male with h/o ETOH abuse, pancreatitis, esophagitis, hep C, CKD 4, present to ED with complain of abd pain, N/V and coffee ground emesisi. Patient reported severe, 10/10, sharp/burning epigastric pain, associated with multiple episode of coffee ground emesis.   Tachycardic to 116, BP stable upon ED arrival, HR improve to 107 after IV fluid. EKG with sinus tachy, , QTc 468. WBC 21.46, Hb 12.2 ( was 7-8 range in 01/2022), plt 450, lactate 1.3, K 4.1, Cr 4.23 ( 3.37 on 02/09/2022), hsTnT 11.1. CXR image reviewed, no focal consolidation. CT abd/pelvis with no acute pathology    SH: former ETOH use (01 Mar 2022 11:44)      SUBJECTIVE & OBJECTIVE: Pt seen and examined at bedside.   no overnight events.   no further coffee ground emesis   tolerated regular diet today     Denies fever, chills, N/V, dizziness, HA, cough, CP, palpitations, SOB,  dysuria, brbpr or melena       PHYSICAL EXAM:  Vital Signs Last 24 Hrs  T(C): 36.6 (03 Mar 2022 11:08), Max: 37 (03 Mar 2022 05:45)  T(F): 97.8 (03 Mar 2022 11:08), Max: 98.6 (03 Mar 2022 05:45)  HR: 91 (03 Mar 2022 11:08) (86 - 100)  BP: 159/77 (03 Mar 2022 11:08) (120/63 - 159/77)  BP(mean): --  RR: 18 (03 Mar 2022 11:08) (18 - 18)  SpO2: 100% (03 Mar 2022 11:08) (99% - 100%)        GENERAL: NAD, no increased WOB  HEAD:  Atraumatic, Normocephalic  EYES: EOMI, PERRLA, conjunctiva and sclera clear  ENMT: Moist mucous membranes  NECK: Supple, No JVD  NERVOUS SYSTEM:  Alert & Oriented X3, no focal neuro deficits   CHEST/LUNG: Clear to auscultation bilaterally; No rales, rhonchi, wheezing, or rubs  HEART: Regular rate and rhythm; No murmurs, rubs, or gallops  ABDOMEN: Soft, epigastric tenderness on palpation,  Nondistended; Bowel sounds present, no guarding or rigidity   EXTREMITIES:  2+ Peripheral Pulses b/l, No clubbing, cyanosis, calf tenderness or edema b/l    MEDICATIONS  (STANDING):  calcium acetate 667 milliGRAM(s) Oral three times a day with meals  cyanocobalamin 1000 MICROGram(s) Oral daily  folic acid 1 milliGRAM(s) Oral daily  gabapentin 200 milliGRAM(s) Oral daily  lactated ringers. 1000 milliLiter(s) (125 mL/Hr) IV Continuous <Continuous>  multivitamin 1 Tablet(s) Oral daily  pancrelipase  (CREON  6,000 Lipase Units) 2 Capsule(s) Oral three times a day with meals  pantoprazole  Injectable 40 milliGRAM(s) IV Push every 12 hours  senna 2 Tablet(s) Oral at bedtime  sodium bicarbonate 650 milliGRAM(s) Oral every 6 hours  sucralfate suspension 1 Gram(s) Oral four times a day  tamsulosin 0.4 milliGRAM(s) Oral at bedtime  thiamine 100 milliGRAM(s) Oral daily    MEDICATIONS  (PRN):  acetaminophen     Tablet .. 650 milliGRAM(s) Oral every 6 hours PRN Mild Pain (1 - 3), Moderate Pain (4 - 6)  aluminum hydroxide/magnesium hydroxide/simethicone Suspension 30 milliLiter(s) Oral every 4 hours PRN Dyspepsia  melatonin 3 milliGRAM(s) Oral at bedtime PRN Insomnia  ondansetron Injectable 4 milliGRAM(s) IV Push every 8 hours PRN Nausea and/or Vomiting  oxyCODONE    IR 5 milliGRAM(s) Oral every 6 hours PRN Severe Pain (7 - 10)      LABS:                                   7.4    10.34 )-----------( 297      ( 03 Mar 2022 06:34 )             24.3   03-03    144  |  117<H>  |  34<H>  ----------------------------<  108<H>  3.3<L>   |  23  |  3.15<H>    Ca    8.2<L>      03 Mar 2022 06:34  Phos  2.6     03-03  Mg     2.0     03-03    TPro  5.6<L>  /  Alb  2.2<L>  /  TBili  0.1<L>  /  DBili  <0.05  /  AST  8<L>  /  ALT  7<L>  /  AlkPhos  70  03-03          Magnesium, Serum: 2.3 mg/dL (03-01 @ 18:34)    CAPILLARY BLOOD GLUCOSE                RECENT CULTURES:      RADIOLOGY & ADDITIONAL TESTS:  < from: Xray Chest 1 View- PORTABLE-Routine (Xray Chest 1 View- PORTABLE-Routine .) (03.01.22 @ 06:25) >  INTERPRETATION:  AP semierect chest on March 1, 2022 at 6:13 AM. 2   images. Patient has chest pain.    Heart size is within normal limits.    The lung fields and pleural surfaces are unremarkable.    Chest is similar to January 26 of this year.    IMPRESSION: No acute finding or change.    < end of copied text >    < from: CT Abdomen and Pelvis No Cont (03.01.22 @ 10:29) >    ACC: [id number] EXAM:  CT ABDOMEN AND PELVIS                          PROCEDURE DATE:  03/01/2022          INTERPRETATION:  CLINICAL INFORMATION: Epigastric abdominal pain    COMPARISON: 1/25/2022    CONTRAST/COMPLICATIONS:  IV Contrast: None  Oral Contrast: None  Complications: None    PROCEDURE:  CT of the Abdomen and Pelvis was performed.  Sagittal and coronal reformats were performed.    FINDINGS:  LOWER CHEST: Clear lung bases.    Please note that evaluation of the abdominal organs and vascular   structures is limited by lack of intravenous contrast.    LIVER: Within normal limits.  BILE DUCTS: Normal caliber.  GALLBLADDER: Within normal limits.  SPLEEN: Within normal limits.  PANCREAS: Coarse pancreatic calcifications. Stable pancreaticductal   dilatation measuring up to 1.6 cm.  ADRENALS: Within normal limits.  KIDNEYS/URETERS: Left renal hypodensity, possibly cyst. No hydronephrosis.    BLADDER: Within normal limits.  REPRODUCTIVE ORGANS: Prostate is mildly enlarged.    BOWEL: Nobowel obstruction. Appendix is within normal limits.  PERITONEUM: No ascites.  VESSELS: Atherosclerotic calcifications.  RETROPERITONEUM/LYMPH NODES: No lymphadenopathy.  ABDOMINAL WALL: Postsurgical changes.  BONES: Degenerative changes. Left sacroiliac fusion. Stable left iliac   lytic lesion.    IMPRESSION:  Etiology of abdominal pain is not elucidated.    < end of copied text >          Imaging Personally Reviewed:  [ ] YES  [ ] NO    Consultant(s) Notes Reviewed:  [x ] YES  [ ] NO    Care Discussed with Consultants/Other Providers [x ] YES  [ ] NO  Care discussed in detail with patient.  All questions and concerns addressed     Patient is a 61y old  Male who presents with a chief complaint of coffee ground emesis (01 Mar 2022 17:32)        HPI:  61 years old male with h/o ETOH abuse, pancreatitis, esophagitis, hep C, CKD 4, present to ED with complain of abd pain, N/V and coffee ground emesisi. Patient reported severe, 10/10, sharp/burning epigastric pain, associated with multiple episode of coffee ground emesis.   Tachycardic to 116, BP stable upon ED arrival, HR improve to 107 after IV fluid. EKG with sinus tachy, , QTc 468. WBC 21.46, Hb 12.2 ( was 7-8 range in 01/2022), plt 450, lactate 1.3, K 4.1, Cr 4.23 ( 3.37 on 02/09/2022), hsTnT 11.1. CXR image reviewed, no focal consolidation. CT abd/pelvis with no acute pathology    SH: former ETOH use (01 Mar 2022 11:44)      SUBJECTIVE & OBJECTIVE: Pt seen and examined at bedside.   no overnight events.   no further coffee ground emesis   tolerated regular diet today     Denies fever, chills, N/V, dizziness, HA, cough, CP, palpitations, SOB,  dysuria, brbpr or melena       PHYSICAL EXAM:  Vital Signs Last 24 Hrs  T(C): 36.6 (03 Mar 2022 11:08), Max: 37 (03 Mar 2022 05:45)  T(F): 97.8 (03 Mar 2022 11:08), Max: 98.6 (03 Mar 2022 05:45)  HR: 91 (03 Mar 2022 11:08) (86 - 100)  BP: 159/77 (03 Mar 2022 11:08) (120/63 - 159/77)  BP(mean): --  RR: 18 (03 Mar 2022 11:08) (18 - 18)  SpO2: 100% (03 Mar 2022 11:08) (99% - 100%)        GENERAL: NAD, no increased WOB  HEAD:  Atraumatic, Normocephalic  EYES: EOMI, PERRLA, conjunctiva and sclera clear  ENMT: Moist mucous membranes  NECK: Supple, No JVD  NERVOUS SYSTEM:  Alert & Oriented X3, no focal neuro deficits   CHEST/LUNG: Clear to auscultation bilaterally; No rales, rhonchi, wheezing, or rubs  HEART: Regular rate and rhythm; No murmurs, rubs, or gallops  ABDOMEN: Soft, epigastric tenderness on palpation,  Nondistended; Bowel sounds present, no guarding or rigidity   EXTREMITIES:  2+ Peripheral Pulses b/l, No clubbing, cyanosis, calf tenderness or edema b/l    MEDICATIONS  (STANDING):  calcium acetate 667 milliGRAM(s) Oral three times a day with meals  cyanocobalamin 1000 MICROGram(s) Oral daily  folic acid 1 milliGRAM(s) Oral daily  gabapentin 200 milliGRAM(s) Oral daily  lactated ringers. 1000 milliLiter(s) (125 mL/Hr) IV Continuous <Continuous>  multivitamin 1 Tablet(s) Oral daily  pancrelipase  (CREON  6,000 Lipase Units) 2 Capsule(s) Oral three times a day with meals  pantoprazole  Injectable 40 milliGRAM(s) IV Push every 12 hours  senna 2 Tablet(s) Oral at bedtime  sodium bicarbonate 650 milliGRAM(s) Oral every 6 hours  sucralfate suspension 1 Gram(s) Oral four times a day  tamsulosin 0.4 milliGRAM(s) Oral at bedtime  thiamine 100 milliGRAM(s) Oral daily    MEDICATIONS  (PRN):  acetaminophen     Tablet .. 650 milliGRAM(s) Oral every 6 hours PRN Mild Pain (1 - 3), Moderate Pain (4 - 6)  aluminum hydroxide/magnesium hydroxide/simethicone Suspension 30 milliLiter(s) Oral every 4 hours PRN Dyspepsia  melatonin 3 milliGRAM(s) Oral at bedtime PRN Insomnia  ondansetron Injectable 4 milliGRAM(s) IV Push every 8 hours PRN Nausea and/or Vomiting  oxyCODONE    IR 5 milliGRAM(s) Oral every 6 hours PRN Severe Pain (7 - 10)      LABS:                                   7.4    10.34 )-----------( 297      ( 03 Mar 2022 06:34 )             24.3   03-03    144  |  117<H>  |  34<H>  ----------------------------<  108<H>  3.3<L>   |  23  |  3.15<H>    Ca    8.2<L>      03 Mar 2022 06:34  Phos  2.6     03-03  Mg     2.0     03-03    TPro  5.6<L>  /  Alb  2.2<L>  /  TBili  0.1<L>  /  DBili  <0.05  /  AST  8<L>  /  ALT  7<L>  /  AlkPhos  70  03-03          Magnesium, Serum: 2.3 mg/dL (03-01 @ 18:34)    CAPILLARY BLOOD GLUCOSE                RECENT CULTURES:      RADIOLOGY & ADDITIONAL TESTS:  < from: Xray Chest 1 View- PORTABLE-Routine (Xray Chest 1 View- PORTABLE-Routine .) (03.01.22 @ 06:25) >  INTERPRETATION:  AP semierect chest on March 1, 2022 at 6:13 AM. 2   images. Patient has chest pain.    Heart size is within normal limits.    The lung fields and pleural surfaces are unremarkable.    Chest is similar to January 26 of this year.    IMPRESSION: No acute finding or change.    < end of copied text >    < from: CT Abdomen and Pelvis No Cont (03.01.22 @ 10:29) >    ACC: [id number] EXAM:  CT ABDOMEN AND PELVIS                          PROCEDURE DATE:  03/01/2022          INTERPRETATION:  CLINICAL INFORMATION: Epigastric abdominal pain    COMPARISON: 1/25/2022    CONTRAST/COMPLICATIONS:  IV Contrast: None  Oral Contrast: None  Complications: None    PROCEDURE:  CT of the Abdomen and Pelvis was performed.  Sagittal and coronal reformats were performed.    FINDINGS:  LOWER CHEST: Clear lung bases.    Please note that evaluation of the abdominal organs and vascular   structures is limited by lack of intravenous contrast.    LIVER: Within normal limits.  BILE DUCTS: Normal caliber.  GALLBLADDER: Within normal limits.  SPLEEN: Within normal limits.  PANCREAS: Coarse pancreatic calcifications. Stable pancreaticductal   dilatation measuring up to 1.6 cm.  ADRENALS: Within normal limits.  KIDNEYS/URETERS: Left renal hypodensity, possibly cyst. No hydronephrosis.    BLADDER: Within normal limits.  REPRODUCTIVE ORGANS: Prostate is mildly enlarged.    BOWEL: Nobowel obstruction. Appendix is within normal limits.  PERITONEUM: No ascites.  VESSELS: Atherosclerotic calcifications.  RETROPERITONEUM/LYMPH NODES: No lymphadenopathy.  ABDOMINAL WALL: Postsurgical changes.  BONES: Degenerative changes. Left sacroiliac fusion. Stable left iliac   lytic lesion.    IMPRESSION:  Etiology of abdominal pain is not elucidated.    < end of copied text >    Historical Values  Surgical Pathology Report:   ACCESSION No: [id number]   Patient: IMELDA ROBERTSON   Accession: [id number]   Collected Date/Time: 1/28/2022 12:46 EST   Received Date/Time: 1/31/2022 12:46 EST   Surgical Pathology Report - Auth (Verified)   Specimen(s) Submitted   1- Stomach antrum body   2- GE Junction   Final Diagnosis   1. Stomach, antrum and body, biopsy:   - Gastric antral mucosa with mild chronic focal active gastritis   - Gastric oxyntic mucosa with no significant histopathologic findings   - No morphologic evidence of Helicobacter microorganisms   - Negative for intestinal metaplasia   2. GE Junction, biopsy:   - Squamocolumnar mucosa with ulcer, granulation tissue, active   esophagitis and active carditis   - Negative for fungal organisms (GMS stain)   - Negative for intestinal metaplasia Surgical Pathology Report:   ACCESSION No: [id number]   Patient: IMELDA ROBERTSON   Accession: [id number]   Collected Date/Time: 12/23/2021 15:50 EST   Received Date/Time: 12/27/2021 11:59 EST   Surgical Pathology Report - Auth (Verified)   Specimen(s) Submitted   1 Duodenum, biopsy   2 Antrum biopsy   Final Diagnosis   1. Duodenum, biopsy:   - Duodenal mucosa with mild Brunner's gland hyperplasia and focal   cystically dilated gland. Negative for Helicobacter pylori (cresyl   violet stain   2. Gastric antrum biopsy:   - Mild chronic antral gastritis   - Negative for Helicobacter pylori (cresyl violet stain           Imaging Personally Reviewed:  [ ] YES  [ ] NO    Consultant(s) Notes Reviewed:  [x ] YES  [ ] NO    Care Discussed with Consultants/Other Providers [x ] YES  [ ] NO  Care discussed in detail with patient.  All questions and concerns addressed

## 2022-03-04 LAB
ANION GAP SERPL CALC-SCNC: 4 MMOL/L — LOW (ref 5–17)
BUN SERPL-MCNC: 28 MG/DL — HIGH (ref 7–23)
CALCIUM SERPL-MCNC: 8.3 MG/DL — LOW (ref 8.5–10.1)
CHLORIDE SERPL-SCNC: 120 MMOL/L — HIGH (ref 96–108)
CO2 SERPL-SCNC: 24 MMOL/L — SIGNIFICANT CHANGE UP (ref 22–31)
CREAT SERPL-MCNC: 3.06 MG/DL — HIGH (ref 0.5–1.3)
EGFR: 22 ML/MIN/1.73M2 — LOW
GLUCOSE SERPL-MCNC: 115 MG/DL — HIGH (ref 70–99)
HCT VFR BLD CALC: 29.6 % — LOW (ref 39–50)
HGB BLD-MCNC: 9.1 G/DL — LOW (ref 13–17)
MAGNESIUM SERPL-MCNC: 1.9 MG/DL — SIGNIFICANT CHANGE UP (ref 1.6–2.6)
MCHC RBC-ENTMCNC: 26.6 PG — LOW (ref 27–34)
MCHC RBC-ENTMCNC: 30.7 G/DL — LOW (ref 32–36)
MCV RBC AUTO: 86.5 FL — SIGNIFICANT CHANGE UP (ref 80–100)
NRBC # BLD: 0 /100 WBCS — SIGNIFICANT CHANGE UP (ref 0–0)
PHOSPHATE SERPL-MCNC: 2.5 MG/DL — SIGNIFICANT CHANGE UP (ref 2.5–4.5)
PLATELET # BLD AUTO: 297 K/UL — SIGNIFICANT CHANGE UP (ref 150–400)
POTASSIUM SERPL-MCNC: 3.6 MMOL/L — SIGNIFICANT CHANGE UP (ref 3.5–5.3)
POTASSIUM SERPL-SCNC: 3.6 MMOL/L — SIGNIFICANT CHANGE UP (ref 3.5–5.3)
RBC # BLD: 3.42 M/UL — LOW (ref 4.2–5.8)
RBC # FLD: 22.7 % — HIGH (ref 10.3–14.5)
SODIUM SERPL-SCNC: 148 MMOL/L — HIGH (ref 135–145)
WBC # BLD: 9.92 K/UL — SIGNIFICANT CHANGE UP (ref 3.8–10.5)
WBC # FLD AUTO: 9.92 K/UL — SIGNIFICANT CHANGE UP (ref 3.8–10.5)

## 2022-03-04 PROCEDURE — 99232 SBSQ HOSP IP/OBS MODERATE 35: CPT

## 2022-03-04 RX ORDER — SODIUM CHLORIDE 9 MG/ML
1000 INJECTION, SOLUTION INTRAVENOUS
Refills: 0 | Status: DISCONTINUED | OUTPATIENT
Start: 2022-03-04 | End: 2022-03-05

## 2022-03-04 RX ADMIN — Medication 1 GRAM(S): at 17:42

## 2022-03-04 RX ADMIN — Medication 667 MILLIGRAM(S): at 12:13

## 2022-03-04 RX ADMIN — Medication 10 MILLIGRAM(S): at 17:42

## 2022-03-04 RX ADMIN — Medication 667 MILLIGRAM(S): at 17:42

## 2022-03-04 RX ADMIN — Medication 2 CAPSULE(S): at 17:42

## 2022-03-04 RX ADMIN — Medication 667 MILLIGRAM(S): at 08:09

## 2022-03-04 RX ADMIN — MORPHINE SULFATE 3 MILLIGRAM(S): 50 CAPSULE, EXTENDED RELEASE ORAL at 02:12

## 2022-03-04 RX ADMIN — Medication 650 MILLIGRAM(S): at 05:22

## 2022-03-04 RX ADMIN — GABAPENTIN 200 MILLIGRAM(S): 400 CAPSULE ORAL at 12:13

## 2022-03-04 RX ADMIN — PREGABALIN 1000 MICROGRAM(S): 225 CAPSULE ORAL at 12:13

## 2022-03-04 RX ADMIN — Medication 650 MILLIGRAM(S): at 12:13

## 2022-03-04 RX ADMIN — IRON SUCROSE 210 MILLIGRAM(S): 20 INJECTION, SOLUTION INTRAVENOUS at 13:56

## 2022-03-04 RX ADMIN — MORPHINE SULFATE 3 MILLIGRAM(S): 50 CAPSULE, EXTENDED RELEASE ORAL at 10:20

## 2022-03-04 RX ADMIN — Medication 1 MILLIGRAM(S): at 12:13

## 2022-03-04 RX ADMIN — Medication 100 MILLIGRAM(S): at 12:13

## 2022-03-04 RX ADMIN — MORPHINE SULFATE 3 MILLIGRAM(S): 50 CAPSULE, EXTENDED RELEASE ORAL at 15:20

## 2022-03-04 RX ADMIN — Medication 1 GRAM(S): at 05:22

## 2022-03-04 RX ADMIN — MORPHINE SULFATE 3 MILLIGRAM(S): 50 CAPSULE, EXTENDED RELEASE ORAL at 01:42

## 2022-03-04 RX ADMIN — SENNA PLUS 2 TABLET(S): 8.6 TABLET ORAL at 22:06

## 2022-03-04 RX ADMIN — Medication 2 CAPSULE(S): at 08:08

## 2022-03-04 RX ADMIN — MORPHINE SULFATE 3 MILLIGRAM(S): 50 CAPSULE, EXTENDED RELEASE ORAL at 15:05

## 2022-03-04 RX ADMIN — PANTOPRAZOLE SODIUM 40 MILLIGRAM(S): 20 TABLET, DELAYED RELEASE ORAL at 17:42

## 2022-03-04 RX ADMIN — Medication 1 TABLET(S): at 12:13

## 2022-03-04 RX ADMIN — SODIUM CHLORIDE 65 MILLILITER(S): 9 INJECTION, SOLUTION INTRAVENOUS at 21:30

## 2022-03-04 RX ADMIN — TAMSULOSIN HYDROCHLORIDE 0.4 MILLIGRAM(S): 0.4 CAPSULE ORAL at 22:06

## 2022-03-04 RX ADMIN — MORPHINE SULFATE 3 MILLIGRAM(S): 50 CAPSULE, EXTENDED RELEASE ORAL at 06:21

## 2022-03-04 RX ADMIN — Medication 1 GRAM(S): at 12:13

## 2022-03-04 RX ADMIN — MORPHINE SULFATE 3 MILLIGRAM(S): 50 CAPSULE, EXTENDED RELEASE ORAL at 20:25

## 2022-03-04 RX ADMIN — MORPHINE SULFATE 3 MILLIGRAM(S): 50 CAPSULE, EXTENDED RELEASE ORAL at 20:10

## 2022-03-04 RX ADMIN — Medication 2 CAPSULE(S): at 12:13

## 2022-03-04 RX ADMIN — MORPHINE SULFATE 3 MILLIGRAM(S): 50 CAPSULE, EXTENDED RELEASE ORAL at 05:51

## 2022-03-04 RX ADMIN — Medication 10 MILLIGRAM(S): at 12:13

## 2022-03-04 RX ADMIN — Medication 10 MILLIGRAM(S): at 05:22

## 2022-03-04 RX ADMIN — MORPHINE SULFATE 3 MILLIGRAM(S): 50 CAPSULE, EXTENDED RELEASE ORAL at 10:34

## 2022-03-04 RX ADMIN — Medication 650 MILLIGRAM(S): at 17:42

## 2022-03-04 RX ADMIN — PANTOPRAZOLE SODIUM 40 MILLIGRAM(S): 20 TABLET, DELAYED RELEASE ORAL at 05:22

## 2022-03-04 NOTE — PHYSICAL THERAPY INITIAL EVALUATION ADULT - LIVES WITH, PROFILE
Pt states he lives alone in a pvt house , has 4 steps with rails to enter , once inside stays on main floor.

## 2022-03-04 NOTE — PROGRESS NOTE ADULT - SUBJECTIVE AND OBJECTIVE BOX
Pt stable - no changes  still with hiccups      MEDICATIONS  (STANDING):  calcium acetate 667 milliGRAM(s) Oral three times a day with meals  cyanocobalamin 1000 MICROGram(s) Oral daily  dextrose 5% + sodium chloride 0.45%. 1000 milliLiter(s) (75 mL/Hr) IV Continuous <Continuous>  folic acid 1 milliGRAM(s) Oral daily  gabapentin 200 milliGRAM(s) Oral daily  iron sucrose IVPB 100 milliGRAM(s) IV Intermittent every 24 hours  metoclopramide 10 milliGRAM(s) Oral four times a day  multivitamin 1 Tablet(s) Oral daily  pancrelipase  (CREON  6,000 Lipase Units) 2 Capsule(s) Oral three times a day with meals  pantoprazole  Injectable 40 milliGRAM(s) IV Push every 12 hours  senna 2 Tablet(s) Oral at bedtime  sodium bicarbonate 650 milliGRAM(s) Oral every 6 hours  sucralfate suspension 1 Gram(s) Oral four times a day  tamsulosin 0.4 milliGRAM(s) Oral at bedtime  thiamine 100 milliGRAM(s) Oral daily    MEDICATIONS  (PRN):  acetaminophen     Tablet .. 650 milliGRAM(s) Oral every 6 hours PRN Mild Pain (1 - 3)  aluminum hydroxide/magnesium hydroxide/simethicone Suspension 30 milliLiter(s) Oral every 4 hours PRN Dyspepsia  LORazepam   Injectable 2 milliGRAM(s) IV Push every 4 hours PRN if CIWA >8 , notify MD  melatonin 3 milliGRAM(s) Oral at bedtime PRN Insomnia  morphine  - Injectable 3 milliGRAM(s) IV Push every 4 hours PRN Severe Pain (7 - 10)  ondansetron Injectable 4 milliGRAM(s) IV Push every 8 hours PRN Nausea and/or Vomiting      Allergies    No Known Allergies    Intolerances        Vital Signs Last 24 Hrs  T(C): 36.9 (04 Mar 2022 11:14), Max: 37 (03 Mar 2022 18:20)  T(F): 98.5 (04 Mar 2022 11:14), Max: 98.6 (03 Mar 2022 18:20)  HR: 77 (04 Mar 2022 11:14) (74 - 88)  BP: 126/66 (04 Mar 2022 11:14) (126/66 - 163/87)  BP(mean): --  RR: 17 (04 Mar 2022 11:14) (17 - 19)  SpO2: 97% (04 Mar 2022 11:14) (97% - 100%)    PHYSICAL EXAM:  General: NAD.  CVS: S1, S2  Chest: air entry bilaterally present  Abd: BS present, soft, non-tender      LABS:                        9.1    9.92  )-----------( 297      ( 04 Mar 2022 07:23 )             29.6     03-04    148<H>  |  120<H>  |  28<H>  ----------------------------<  115<H>  3.6   |  24  |  3.06<H>    Ca    8.3<L>      04 Mar 2022 07:23  Phos  2.5     03-04  Mg     1.9     03-04    TPro  5.6<L>  /  Alb  2.2<L>  /  TBili  0.1<L>  /  DBili  <0.05  /  AST  8<L>  /  ALT  7<L>  /  AlkPhos  70  03-03      Pt has been advised to see surgeon for evaluation of hiatal hernia as a cause of his symptoms  Pt advised to make appointment with surgeon upon discharge  continue meds  advance diet

## 2022-03-04 NOTE — PROGRESS NOTE ADULT - SUBJECTIVE AND OBJECTIVE BOX
Patient is a 61y old  Male who presents with a chief complaint of coffee ground emesis (01 Mar 2022 17:32)        HPI:  61 years old male with h/o ETOH abuse, pancreatitis, esophagitis, hep C, CKD 4, present to ED with complain of abd pain, N/V and coffee ground emesisi. Patient reported severe, 10/10, sharp/burning epigastric pain, associated with multiple episode of coffee ground emesis.   Tachycardic to 116, BP stable upon ED arrival, HR improve to 107 after IV fluid. EKG with sinus tachy, , QTc 468. WBC 21.46, Hb 12.2 ( was 7-8 range in 01/2022), plt 450, lactate 1.3, K 4.1, Cr 4.23 ( 3.37 on 02/09/2022), hsTnT 11.1. CXR image reviewed, no focal consolidation. CT abd/pelvis with no acute pathology    SH: former ETOH use (01 Mar 2022 11:44)      SUBJECTIVE & OBJECTIVE:   Pt seen and examined at bedside.   no overnight events.   noted to have been vomiting after eating regular food, changed diet to liquid diet   no coffee ground emesis noted.    patient initially wanted to leave AMA yesterday because he did not want a liquid diet but then changed his mind and decided to stay.     Denies fever, chills, N/V, dizziness, HA, cough, CP, palpitations, SOB,  dysuria, brbpr or melena       PHYSICAL EXAM:  Vital Signs Last 24 Hrs  T(C): 36.9 (04 Mar 2022 11:14), Max: 37 (03 Mar 2022 18:20)  T(F): 98.5 (04 Mar 2022 11:14), Max: 98.6 (03 Mar 2022 18:20)  HR: 77 (04 Mar 2022 11:14) (74 - 88)  BP: 126/66 (04 Mar 2022 11:14) (126/66 - 163/87)  BP(mean): --  RR: 17 (04 Mar 2022 11:14) (17 - 19)  SpO2: 97% (04 Mar 2022 11:14) (97% - 100%)        GENERAL: NAD, no increased WOB  HEAD:  Atraumatic, Normocephalic  EYES: EOMI, PERRLA, conjunctiva and sclera clear  ENMT: Moist mucous membranes  NECK: Supple, No JVD  NERVOUS SYSTEM:  Alert & Oriented X3, no focal neuro deficits   CHEST/LUNG: Clear to auscultation bilaterally; No rales, rhonchi, wheezing, or rubs  HEART: Regular rate and rhythm; No murmurs, rubs, or gallops  ABDOMEN: Soft, epigastric tenderness on palpation,  Nondistended; Bowel sounds present, no guarding or rigidity   EXTREMITIES:  2+ Peripheral Pulses b/l, No clubbing, cyanosis, calf tenderness or edema b/l    MEDICATIONS  (STANDING):  calcium acetate 667 milliGRAM(s) Oral three times a day with meals  cyanocobalamin 1000 MICROGram(s) Oral daily  folic acid 1 milliGRAM(s) Oral daily  gabapentin 200 milliGRAM(s) Oral daily  lactated ringers. 1000 milliLiter(s) (125 mL/Hr) IV Continuous <Continuous>  multivitamin 1 Tablet(s) Oral daily  pancrelipase  (CREON  6,000 Lipase Units) 2 Capsule(s) Oral three times a day with meals  pantoprazole  Injectable 40 milliGRAM(s) IV Push every 12 hours  senna 2 Tablet(s) Oral at bedtime  sodium bicarbonate 650 milliGRAM(s) Oral every 6 hours  sucralfate suspension 1 Gram(s) Oral four times a day  tamsulosin 0.4 milliGRAM(s) Oral at bedtime  thiamine 100 milliGRAM(s) Oral daily    MEDICATIONS  (PRN):  acetaminophen     Tablet .. 650 milliGRAM(s) Oral every 6 hours PRN Mild Pain (1 - 3), Moderate Pain (4 - 6)  aluminum hydroxide/magnesium hydroxide/simethicone Suspension 30 milliLiter(s) Oral every 4 hours PRN Dyspepsia  melatonin 3 milliGRAM(s) Oral at bedtime PRN Insomnia  ondansetron Injectable 4 milliGRAM(s) IV Push every 8 hours PRN Nausea and/or Vomiting  oxyCODONE    IR 5 milliGRAM(s) Oral every 6 hours PRN Severe Pain (7 - 10)      LABS:                        9.1    9.92  )-----------( 297      ( 04 Mar 2022 07:23 )             29.6   03-04    148<H>  |  120<H>  |  28<H>  ----------------------------<  115<H>  3.6   |  24  |  3.06<H>    Ca    8.3<L>      04 Mar 2022 07:23  Phos  2.5     03-04  Mg     1.9     03-04    TPro  5.6<L>  /  Alb  2.2<L>  /  TBili  0.1<L>  /  DBili  <0.05  /  AST  8<L>  /  ALT  7<L>  /  AlkPhos  70  03-03          Magnesium, Serum: 2.3 mg/dL (03-01 @ 18:34)    CAPILLARY BLOOD GLUCOSE                RECENT CULTURES:      RADIOLOGY & ADDITIONAL TESTS:  < from: Xray Chest 1 View- PORTABLE-Routine (Xray Chest 1 View- PORTABLE-Routine .) (03.01.22 @ 06:25) >  INTERPRETATION:  AP semierect chest on March 1, 2022 at 6:13 AM. 2   images. Patient has chest pain.    Heart size is within normal limits.    The lung fields and pleural surfaces are unremarkable.    Chest is similar to January 26 of this year.    IMPRESSION: No acute finding or change.    < end of copied text >    < from: CT Abdomen and Pelvis No Cont (03.01.22 @ 10:29) >    ACC: [id number] EXAM:  CT ABDOMEN AND PELVIS                          PROCEDURE DATE:  03/01/2022          INTERPRETATION:  CLINICAL INFORMATION: Epigastric abdominal pain    COMPARISON: 1/25/2022    CONTRAST/COMPLICATIONS:  IV Contrast: None  Oral Contrast: None  Complications: None    PROCEDURE:  CT of the Abdomen and Pelvis was performed.  Sagittal and coronal reformats were performed.    FINDINGS:  LOWER CHEST: Clear lung bases.    Please note that evaluation of the abdominal organs and vascular   structures is limited by lack of intravenous contrast.    LIVER: Within normal limits.  BILE DUCTS: Normal caliber.  GALLBLADDER: Within normal limits.  SPLEEN: Within normal limits.  PANCREAS: Coarse pancreatic calcifications. Stable pancreaticductal   dilatation measuring up to 1.6 cm.  ADRENALS: Within normal limits.  KIDNEYS/URETERS: Left renal hypodensity, possibly cyst. No hydronephrosis.    BLADDER: Within normal limits.  REPRODUCTIVE ORGANS: Prostate is mildly enlarged.    BOWEL: Nobowel obstruction. Appendix is within normal limits.  PERITONEUM: No ascites.  VESSELS: Atherosclerotic calcifications.  RETROPERITONEUM/LYMPH NODES: No lymphadenopathy.  ABDOMINAL WALL: Postsurgical changes.  BONES: Degenerative changes. Left sacroiliac fusion. Stable left iliac   lytic lesion.    IMPRESSION:  Etiology of abdominal pain is not elucidated.    < end of copied text >    Historical Values  Surgical Pathology Report:   ACCESSION No: [id number]   Patient: IMELDA ROBERTSON   Accession: [id number]   Collected Date/Time: 1/28/2022 12:46 EST   Received Date/Time: 1/31/2022 12:46 EST   Surgical Pathology Report - Auth (Verified)   Specimen(s) Submitted   1- Stomach antrum body   2- GE Junction   Final Diagnosis   1. Stomach, antrum and body, biopsy:   - Gastric antral mucosa with mild chronic focal active gastritis   - Gastric oxyntic mucosa with no significant histopathologic findings   - No morphologic evidence of Helicobacter microorganisms   - Negative for intestinal metaplasia   2. GE Junction, biopsy:   - Squamocolumnar mucosa with ulcer, granulation tissue, active   esophagitis and active carditis   - Negative for fungal organisms (GMS stain)   - Negative for intestinal metaplasia Surgical Pathology Report:   ACCESSION No: [id number]   Patient: IMELDA ROBERTSON   Accession: [id number]   Collected Date/Time: 12/23/2021 15:50 EST   Received Date/Time: 12/27/2021 11:59 EST   Surgical Pathology Report - Auth (Verified)   Specimen(s) Submitted   1 Duodenum, biopsy   2 Antrum biopsy   Final Diagnosis   1. Duodenum, biopsy:   - Duodenal mucosa with mild Brunner's gland hyperplasia and focal   cystically dilated gland. Negative for Helicobacter pylori (cresyl   violet stain   2. Gastric antrum biopsy:   - Mild chronic antral gastritis   - Negative for Helicobacter pylori (cresyl violet stain           Imaging Personally Reviewed:  [ ] YES  [ ] NO    Consultant(s) Notes Reviewed:  [x ] YES  [ ] NO    Care Discussed with Consultants/Other Providers [x ] YES  [ ] NO  Care discussed in detail with patient and mother Dede.  All questions and concerns addressed

## 2022-03-04 NOTE — PROGRESS NOTE ADULT - ASSESSMENT
61 years old male with h/o ETOH abuse, pancreatitis, esophagitis, hep C, CKD 4, present to ED with complaint of abd pain, N/V and coffee ground emesis.     Assessment and Plan:    # Coffee ground emesis, ETOH-gastritis/esophagitis , nausea   no further coffee ground emesis   Abd US 2019: showing fatty liver   CTAP without contrast--no acute findings   FOBT neg   utox + opiates (given here)   EGD path reports reviewed for 1/28/22 and 12/2021 showing mainly Chr gastritis , Negative for intestinal metaplasia, neg for H. pylori or fungal organisms ; small GE junction ulcer and esophagitis   -zofran prn, maalox prn   -PPI IV bid, sucralfate , reglan OTC   pain control prn   3/3 tolerated regular diet. hgb 7.4. suspect intravascularly depleted on arrival. doubt active bleeding. will transfuse 1u PRBC today.   discussed with GI, no plan for EGD inpatient, as recent EGD 1/28/22 reviewed. Patient will need outpatient colonoscopy, discussed at length with patient and teach back method applied   3/4hgb improved to 9.1 after transfusion     #leukocytosis , most likely reactive and d/t hemoconcentration   hypernatremia , continue with D5/0.45NS  WBC normalized     #Iron deficiency anemia   -3/3 s/p venofer x 1 , 1u PRBC   monitor H/H   anemia labs noted   folic acid, vit B12 wnl   TSH OK         # Chronic pancreatitis.    chronic ETOH abuse in the past  CTAP as above   lipase ok   continue with creon     #h/o ETOH abuse , not in w/d   -CIWA protocol , symptom trigger ativan prn   monitor for w/d   -folic acid/MVI/thiamine       # history of Hepatitis C virus infection   - hx of treatment in the past    #YUNIEL on CKD 4   metabolic acidosis in setting of CKD   - avoid nephrotoxic meds, dose all meds per eGFR    calcium acetate, NaHCO3.  Cr improved with IVF   Nephrology following     #Hypokalemia --repleted         #Preventative measures   -SCDs-dvt ppx  fall precautions   PT eval

## 2022-03-04 NOTE — PHYSICAL THERAPY INITIAL EVALUATION ADULT - PERTINENT HX OF CURRENT PROBLEM, REHAB EVAL
Pt is admitted via ED with c/o abdominal pain and coffee ground emesis, epigastric pain ;  h/o ETOH abuse, pancreatitis, esophagitis, CKD stage 4e

## 2022-03-04 NOTE — PHYSICAL THERAPY INITIAL EVALUATION ADULT - STRENGTHENING, PT EVAL
Improve strength in the UE and LE to 5/5, improve general endurance to good and be able to perform functional tasks-bed mobility, sitting, standing, transfers and ambulate in a safe manner with assistive either cane or rolling walker  device and prevent falls.

## 2022-03-04 NOTE — PROGRESS NOTE ADULT - SUBJECTIVE AND OBJECTIVE BOX
[facility] NEPHROLOGY SERVICES, [facility]  NEPHROLOGY AND HYPERTENSION  [address]  [address]  [place] [address]  [phone]    MD HALIE OLIVIA MD ANDREY GONCHARUK, MD MADHU KORRAPATI, MD YELENA ROSENBERG, MD BINNY KOSHY, MD CHRISTOPHER CAPUTO, MD EDWARD BOVER, MD          Patient events noted    MEDICATIONS  (STANDING):  calcium acetate 667 milliGRAM(s) Oral three times a day with meals  cyanocobalamin 1000 MICROGram(s) Oral daily  dextrose 5% + sodium chloride 0.45%. 1000 milliLiter(s) (75 mL/Hr) IV Continuous <Continuous>  folic acid 1 milliGRAM(s) Oral daily  gabapentin 200 milliGRAM(s) Oral daily  iron sucrose IVPB 100 milliGRAM(s) IV Intermittent every 24 hours  metoclopramide 10 milliGRAM(s) Oral four times a day  multivitamin 1 Tablet(s) Oral daily  pancrelipase  (CREON  6,000 Lipase Units) 2 Capsule(s) Oral three times a day with meals  pantoprazole  Injectable 40 milliGRAM(s) IV Push every 12 hours  senna 2 Tablet(s) Oral at bedtime  sodium bicarbonate 650 milliGRAM(s) Oral every 6 hours  sucralfate suspension 1 Gram(s) Oral four times a day  tamsulosin 0.4 milliGRAM(s) Oral at bedtime  thiamine 100 milliGRAM(s) Oral daily    MEDICATIONS  (PRN):  acetaminophen     Tablet .. 650 milliGRAM(s) Oral every 6 hours PRN Mild Pain (1 - 3)  aluminum hydroxide/magnesium hydroxide/simethicone Suspension 30 milliLiter(s) Oral every 4 hours PRN Dyspepsia  LORazepam   Injectable 2 milliGRAM(s) IV Push every 4 hours PRN if CIWA >8 , notify MD  melatonin 3 milliGRAM(s) Oral at bedtime PRN Insomnia  morphine  - Injectable 3 milliGRAM(s) IV Push every 4 hours PRN Severe Pain (7 - 10)  ondansetron Injectable 4 milliGRAM(s) IV Push every 8 hours PRN Nausea and/or Vomiting      03-03-22 @ 07:01  -  03-04-22 @ 07:00  --------------------------------------------------------  IN: 1000 mL / OUT: 1300 mL / NET: -300 mL      PHYSICAL EXAM:      T(C): 37 (03-04-22 @ 17:05), Max: 37 (03-04-22 @ 17:05)  HR: 86 (03-04-22 @ 17:05) (74 - 87)  BP: 149/78 (03-04-22 @ 17:05) (124/72 - 151/81)  RR: 17 (03-04-22 @ 17:05) (17 - 18)  SpO2: 98% (03-04-22 @ 17:05) (97% - 98%)  Wt(kg): --  Lungs clear  Heart S1S2  Abd soft NT ND  Extremities:   tr edema                                    9.1    9.92  )-----------( 297      ( 04 Mar 2022 07:23 )             29.6     03-04    148<H>  |  120<H>  |  28<H>  ----------------------------<  115<H>  3.6   |  24  |  3.06<H>    Ca    8.3<L>      04 Mar 2022 07:23  Phos  2.5     03-04  Mg     1.9     03-04    TPro  5.6<L>  /  Alb  2.2<L>  /  TBili  0.1<L>  /  DBili  <0.05  /  AST  8<L>  /  ALT  7<L>  /  AlkPhos  70  03-03      LIVER FUNCTIONS - ( 03 Mar 2022 06:34 )  Alb: 2.2 g/dL / Pro: 5.6 gm/dL / ALK PHOS: 70 U/L / ALT: 7 U/L / AST: 8 U/L / GGT: x           Creatinine Trend: 3.06<--, 3.15<--, 3.61<--, 3.63<--, 3.82<--, 4.23<--      Assessment   YUNIEL CKD 4; pre renal azotemia, improving  Fe deficiency anemai  Stable     Plan  IVF until po adequate  Venofer IV      Austin Dukes MD

## 2022-03-04 NOTE — PHYSICAL THERAPY INITIAL EVALUATION ADULT - ADDITIONAL COMMENTS
pt states inside the house he uses either a cane or a rolling walker, outside the house he uses a rolling walker to go get grocery about 6 blocks away from his house.

## 2022-03-04 NOTE — PHYSICAL THERAPY INITIAL EVALUATION ADULT - GENERAL OBSERVATIONS, REHAB EVAL
Pt is alert, oriented x 4, follow directions, on room air, c/o abdominal pain and discomfort but not in distress.

## 2022-03-05 ENCOUNTER — TRANSCRIPTION ENCOUNTER (OUTPATIENT)
Age: 62
End: 2022-03-05

## 2022-03-05 VITALS
TEMPERATURE: 97 F | HEART RATE: 85 BPM | OXYGEN SATURATION: 99 % | SYSTOLIC BLOOD PRESSURE: 157 MMHG | DIASTOLIC BLOOD PRESSURE: 84 MMHG | RESPIRATION RATE: 16 BRPM

## 2022-03-05 LAB
ANION GAP SERPL CALC-SCNC: 6 MMOL/L — SIGNIFICANT CHANGE UP (ref 5–17)
BUN SERPL-MCNC: 27 MG/DL — HIGH (ref 7–23)
CALCIUM SERPL-MCNC: 8.4 MG/DL — LOW (ref 8.5–10.1)
CHLORIDE SERPL-SCNC: 119 MMOL/L — HIGH (ref 96–108)
CO2 SERPL-SCNC: 20 MMOL/L — LOW (ref 22–31)
CREAT SERPL-MCNC: 2.91 MG/DL — HIGH (ref 0.5–1.3)
EGFR: 24 ML/MIN/1.73M2 — LOW
GLUCOSE SERPL-MCNC: 151 MG/DL — HIGH (ref 70–99)
HCT VFR BLD CALC: 29.6 % — LOW (ref 39–50)
HGB BLD-MCNC: 9.2 G/DL — LOW (ref 13–17)
MCHC RBC-ENTMCNC: 27 PG — SIGNIFICANT CHANGE UP (ref 27–34)
MCHC RBC-ENTMCNC: 31.1 G/DL — LOW (ref 32–36)
MCV RBC AUTO: 86.8 FL — SIGNIFICANT CHANGE UP (ref 80–100)
NRBC # BLD: 0 /100 WBCS — SIGNIFICANT CHANGE UP (ref 0–0)
PLATELET # BLD AUTO: 323 K/UL — SIGNIFICANT CHANGE UP (ref 150–400)
POTASSIUM SERPL-MCNC: 3.3 MMOL/L — LOW (ref 3.5–5.3)
POTASSIUM SERPL-SCNC: 3.3 MMOL/L — LOW (ref 3.5–5.3)
RBC # BLD: 3.41 M/UL — LOW (ref 4.2–5.8)
RBC # FLD: 22.6 % — HIGH (ref 10.3–14.5)
SODIUM SERPL-SCNC: 145 MMOL/L — SIGNIFICANT CHANGE UP (ref 135–145)
WBC # BLD: 9.4 K/UL — SIGNIFICANT CHANGE UP (ref 3.8–10.5)
WBC # FLD AUTO: 9.4 K/UL — SIGNIFICANT CHANGE UP (ref 3.8–10.5)

## 2022-03-05 PROCEDURE — 99239 HOSP IP/OBS DSCHRG MGMT >30: CPT

## 2022-03-05 RX ORDER — SUCRALFATE 1 G
1 TABLET ORAL
Qty: 120 | Refills: 0
Start: 2022-03-05 | End: 2022-04-03

## 2022-03-05 RX ORDER — GABAPENTIN 400 MG/1
2 CAPSULE ORAL
Qty: 60 | Refills: 0
Start: 2022-03-05 | End: 2022-04-03

## 2022-03-05 RX ORDER — METOCLOPRAMIDE HCL 10 MG
1 TABLET ORAL
Qty: 120 | Refills: 0
Start: 2022-03-05 | End: 2022-04-03

## 2022-03-05 RX ORDER — SODIUM BICARBONATE 1 MEQ/ML
1 SYRINGE (ML) INTRAVENOUS
Qty: 120 | Refills: 0
Start: 2022-03-05 | End: 2022-04-03

## 2022-03-05 RX ORDER — PANTOPRAZOLE SODIUM 20 MG/1
40 TABLET, DELAYED RELEASE ORAL
Refills: 0 | Status: DISCONTINUED | OUTPATIENT
Start: 2022-03-05 | End: 2022-03-05

## 2022-03-05 RX ORDER — TAMSULOSIN HYDROCHLORIDE 0.4 MG/1
1 CAPSULE ORAL
Qty: 30 | Refills: 0
Start: 2022-03-05 | End: 2022-04-03

## 2022-03-05 RX ORDER — OXYCODONE HYDROCHLORIDE 5 MG/1
15 TABLET ORAL EVERY 12 HOURS
Refills: 0 | Status: DISCONTINUED | OUTPATIENT
Start: 2022-03-05 | End: 2022-03-05

## 2022-03-05 RX ORDER — OXYCODONE HYDROCHLORIDE 5 MG/1
1 TABLET ORAL
Qty: 30 | Refills: 0
Start: 2022-03-05 | End: 2022-03-19

## 2022-03-05 RX ORDER — LIPASE/PROTEASE/AMYLASE 16-48-48K
2 CAPSULE,DELAYED RELEASE (ENTERIC COATED) ORAL
Qty: 180 | Refills: 0
Start: 2022-03-05 | End: 2022-04-03

## 2022-03-05 RX ORDER — POTASSIUM CHLORIDE 20 MEQ
40 PACKET (EA) ORAL EVERY 4 HOURS
Refills: 0 | Status: COMPLETED | OUTPATIENT
Start: 2022-03-05 | End: 2022-03-05

## 2022-03-05 RX ORDER — PANTOPRAZOLE SODIUM 20 MG/1
1 TABLET, DELAYED RELEASE ORAL
Qty: 4 | Refills: 0
Start: 2022-03-05 | End: 2022-03-06

## 2022-03-05 RX ORDER — METOCLOPRAMIDE HCL 10 MG
1 TABLET ORAL
Qty: 8 | Refills: 0
Start: 2022-03-05 | End: 2022-03-06

## 2022-03-05 RX ORDER — PANTOPRAZOLE SODIUM 20 MG/1
1 TABLET, DELAYED RELEASE ORAL
Qty: 60 | Refills: 0
Start: 2022-03-05 | End: 2022-04-03

## 2022-03-05 RX ORDER — OXYCODONE HYDROCHLORIDE 5 MG/1
1 TABLET ORAL
Qty: 0 | Refills: 0 | DISCHARGE
Start: 2022-03-05

## 2022-03-05 RX ORDER — CALCIUM ACETATE 667 MG
1 TABLET ORAL
Qty: 90 | Refills: 0
Start: 2022-03-05 | End: 2022-04-03

## 2022-03-05 RX ADMIN — Medication 40 MILLIEQUIVALENT(S): at 13:26

## 2022-03-05 RX ADMIN — Medication 650 MILLIGRAM(S): at 11:39

## 2022-03-05 RX ADMIN — MORPHINE SULFATE 3 MILLIGRAM(S): 50 CAPSULE, EXTENDED RELEASE ORAL at 00:30

## 2022-03-05 RX ADMIN — Medication 1 GRAM(S): at 05:18

## 2022-03-05 RX ADMIN — Medication 650 MILLIGRAM(S): at 00:31

## 2022-03-05 RX ADMIN — Medication 650 MILLIGRAM(S): at 05:18

## 2022-03-05 RX ADMIN — Medication 2 CAPSULE(S): at 09:06

## 2022-03-05 RX ADMIN — ONDANSETRON 4 MILLIGRAM(S): 8 TABLET, FILM COATED ORAL at 09:06

## 2022-03-05 RX ADMIN — Medication 10 MILLIGRAM(S): at 05:18

## 2022-03-05 RX ADMIN — Medication 40 MILLIEQUIVALENT(S): at 09:10

## 2022-03-05 RX ADMIN — PANTOPRAZOLE SODIUM 40 MILLIGRAM(S): 20 TABLET, DELAYED RELEASE ORAL at 05:18

## 2022-03-05 RX ADMIN — GABAPENTIN 200 MILLIGRAM(S): 400 CAPSULE ORAL at 12:17

## 2022-03-05 RX ADMIN — MORPHINE SULFATE 3 MILLIGRAM(S): 50 CAPSULE, EXTENDED RELEASE ORAL at 05:20

## 2022-03-05 RX ADMIN — Medication 100 MILLIGRAM(S): at 11:38

## 2022-03-05 RX ADMIN — Medication 10 MILLIGRAM(S): at 00:31

## 2022-03-05 RX ADMIN — MORPHINE SULFATE 3 MILLIGRAM(S): 50 CAPSULE, EXTENDED RELEASE ORAL at 05:35

## 2022-03-05 RX ADMIN — OXYCODONE HYDROCHLORIDE 15 MILLIGRAM(S): 5 TABLET ORAL at 13:24

## 2022-03-05 RX ADMIN — Medication 2 CAPSULE(S): at 11:38

## 2022-03-05 RX ADMIN — Medication 1 MILLIGRAM(S): at 11:39

## 2022-03-05 RX ADMIN — PREGABALIN 1000 MICROGRAM(S): 225 CAPSULE ORAL at 11:39

## 2022-03-05 RX ADMIN — Medication 667 MILLIGRAM(S): at 11:38

## 2022-03-05 RX ADMIN — MORPHINE SULFATE 3 MILLIGRAM(S): 50 CAPSULE, EXTENDED RELEASE ORAL at 09:57

## 2022-03-05 RX ADMIN — Medication 1 TABLET(S): at 11:38

## 2022-03-05 RX ADMIN — Medication 10 MILLIGRAM(S): at 11:39

## 2022-03-05 RX ADMIN — OXYCODONE HYDROCHLORIDE 15 MILLIGRAM(S): 5 TABLET ORAL at 14:24

## 2022-03-05 RX ADMIN — SODIUM CHLORIDE 75 MILLILITER(S): 9 INJECTION, SOLUTION INTRAVENOUS at 05:38

## 2022-03-05 RX ADMIN — MORPHINE SULFATE 3 MILLIGRAM(S): 50 CAPSULE, EXTENDED RELEASE ORAL at 00:45

## 2022-03-05 RX ADMIN — Medication 1 GRAM(S): at 11:37

## 2022-03-05 RX ADMIN — IRON SUCROSE 210 MILLIGRAM(S): 20 INJECTION, SOLUTION INTRAVENOUS at 13:25

## 2022-03-05 RX ADMIN — Medication 667 MILLIGRAM(S): at 09:06

## 2022-03-05 RX ADMIN — MORPHINE SULFATE 3 MILLIGRAM(S): 50 CAPSULE, EXTENDED RELEASE ORAL at 09:27

## 2022-03-05 RX ADMIN — Medication 1 GRAM(S): at 00:31

## 2022-03-05 NOTE — DISCHARGE NOTE PROVIDER - NSDCMRMEDTOKEN_GEN_ALL_CORE_FT
aluminum hydroxide-magnesium hydroxide 200 mg-200 mg/5 mL oral suspension: 30 milliliter(s) orally every 4 hours, As needed, Dyspepsia  calcium acetate 667 mg oral tablet: 1 tab(s) orally 3 times a day  cyanocobalamin 1000 mcg oral tablet: 1 tab(s) orally once a day  folic acid 1 mg oral tablet: 1 tab(s) orally once a day  gabapentin 100 mg oral capsule: 2 cap(s) orally once a day  metoclopramide 10 mg oral tablet: 1 tab(s) orally 4 times a day  Multiple Vitamins oral tablet: 1 tab(s) orally once a day  oxyCODONE 15 mg oral tablet, extended release: 1 tab(s) orally every 12 hours  oxycodone-acetaminophen 5 mg-325 mg oral tablet: 2 tab(s) orally every 6 hours, As Needed -Moderate Pain (4 - 6) MDD:4   pancrelipase 6000 units-19,000 units-30,000 units oral delayed release capsule: 2 cap(s) orally 3 times a day (with meals)  pantoprazole 40 mg oral delayed release tablet: 1 tab(s) orally 2 times a day   senna oral tablet: 2 tab(s) orally once a day (at bedtime)  sodium bicarbonate 650 mg oral tablet: 1 tab(s) orally every 6 hours  sucralfate 1 g oral tablet: 1 tab(s) orally every 6 hours   tamsulosin 0.4 mg oral capsule: 1 cap(s) orally once a day (at bedtime)  thiamine 100 mg oral tablet: 1 tab(s) orally once a day   aluminum hydroxide-magnesium hydroxide 200 mg-200 mg/5 mL oral suspension: 30 milliliter(s) orally every 4 hours, As needed, Dyspepsia  calcium acetate 667 mg oral tablet: 1 tab(s) orally 3 times a day  cyanocobalamin 1000 mcg oral tablet: 1 tab(s) orally once a day  folic acid 1 mg oral tablet: 1 tab(s) orally once a day  gabapentin 100 mg oral capsule: 2 cap(s) orally once a day  metoclopramide 10 mg oral tablet: 1 tab(s) orally 4 times a day  Multiple Vitamins oral tablet: 1 tab(s) orally once a day  oxyCODONE 15 mg oral tablet, extended release: 1 tab(s) orally every 12 hours MDD:2 tabs  oxycodone-acetaminophen 5 mg-325 mg oral tablet: 2 tab(s) orally every 6 hours, As Needed -Moderate Pain (4 - 6) MDD:4   pancrelipase 6000 units-19,000 units-30,000 units oral delayed release capsule: 2 cap(s) orally 3 times a day (with meals)  pantoprazole 40 mg oral delayed release tablet: 1 tab(s) orally 2 times a day   pantoprazole 40 mg oral delayed release tablet: 1 tab(s) orally 2 times a day   Reglan 10 mg oral tablet: 1 tab(s) orally 4 times a day   senna oral tablet: 2 tab(s) orally once a day (at bedtime)  sodium bicarbonate 650 mg oral tablet: 1 tab(s) orally every 6 hours  sucralfate 1 g oral tablet: 1 tab(s) orally every 6 hours   tamsulosin 0.4 mg oral capsule: 1 cap(s) orally once a day (at bedtime)  thiamine 100 mg oral tablet: 1 tab(s) orally once a day

## 2022-03-05 NOTE — PROGRESS NOTE ADULT - ASSESSMENT
61 years old male with h/o ETOH abuse, pancreatitis, esophagitis, hep C, CKD 4, present to ED with complaint of abd pain, N/V and coffee ground emesis.     Assessment and Plan:    # Coffee ground emesis, gastritis/esophagitis from moderate size hiatal hernia and GERD   no further coffee ground emesis or nausea   patient does not drink ETOH anymore per him and mother   Abd US 2019: showing fatty liver   CTAP without contrast--no acute findings   FOBT neg   utox + opiates (given here)   EGD path reports reviewed for 1/28/22 and 12/2021 showing mainly Chr gastritis , Negative for intestinal metaplasia, neg for H. pylori or fungal organisms ; small GE junction ulcer and esophagitis   -zofran prn, maalox prn   -PPI IV bid, sucralfate , reglan OTC   pain control prn   3/3 tolerated regular diet. hgb 7.4. suspect intravascularly depleted on arrival. doubt active bleeding. will transfuse 1u PRBC today.   discussed with GI, no plan for EGD inpatient, as recent EGD 1/28/22 reviewed. Patient will need outpatient colonoscopy, discussed at length with patient and teach back method applied   3/4hgb improved to 9.1 after transfusion     reviewed EGD from 1/28/22 : recommendation was PPI,carafate. Plan was for EGD in 1 month and possible EUS of the GE juction (once inflammation improves) for further Bx   patient to follow-up with surgery and GI outpatient as recommended by prior admission at [facility])  Surgery  [address] [facility], [address]  [place] [address]  Phone: [phone]  Fax: [phone]  Follow Up Time:     [facility]  Gastroenterology  [address] [place] [address]  Phone: [phone]  Fax: [phone]    discussed at length with patient and follow-up information provided to patient , he expresses understanding       #leukocytosis , most likely reactive and d/t hemoconcentration   hypernatremia , continue with D5/0.45NS  WBC normalized     #Iron deficiency anemia   -3/3 s/p venofer x 1 , 1u PRBC   monitor H/H   anemia labs noted   folic acid, vit B12 wnl   TSH OK         # Chronic pancreatitis.    chronic ETOH abuse in the past  CTAP as above   lipase ok   continue with creon     #h/o ETOH abuse , not in ETOH w/d   -CIWA protocol , symptom trigger ativan prn   monitor for w/d   -folic acid/MVI/thiamine       # history of Hepatitis C virus infection   - hx of treatment in the past    #YUNIEL on CKD 4   metabolic acidosis in setting of CKD   - avoid nephrotoxic meds, dose all meds per eGFR    calcium acetate, NaHCO3.  Cr improved with IVF   Nephrology following     #Hypokalemia --repleted         #Preventative measures   -SCDs-dvt ppx  fall precautions   PT eval :CARMEN

## 2022-03-05 NOTE — DISCHARGE NOTE PROVIDER - CARE PROVIDERS DIRECT ADDRESSES
,DirectAddress_Unknown,[email or web address]

## 2022-03-05 NOTE — DISCHARGE NOTE PROVIDER - CARE PROVIDER_API CALL
Lamber[facility] Hurtado)  Internal Medicine  [address] [place] [address]  Phone: [phone]  Fax: [phone]  Scheduled Appointment: 03/09/2022    [facility]  Gastroenterology  [address] [place] [address]  Phone: [phone]  Fax: [phone]  Follow Up Time: 1 week    [facility])  Surgery  [address] [facility], [address]  [place] [address]  Phone: [phone]  Fax: [phone]  Follow Up Time: 1 week

## 2022-03-05 NOTE — DISCHARGE NOTE PROVIDER - HOSPITAL COURSE
61 years old male with h/o ETOH abuse, pancreatitis, esophagitis, hep C, CKD 4, present to ED with complaint of abd pain, N/V and coffee ground emesis.     DISCHARGE DIAGNOSES   # Coffee ground emesis, gastritis/esophagitis from moderate size hiatal hernia and GERD   no further coffee ground emesis or nausea   patient does not drink ETOH anymore per him and mother   Abd US 2019: showing fatty liver   CTAP without contrast--no acute findings   FOBT neg   utox + opiates (given here)   EGD path reports reviewed for 1/28/22 and 12/2021 showing mainly Chr gastritis , Negative for intestinal metaplasia, neg for H. pylori or fungal organisms ; small GE junction ulcer and esophagitis   -zofran prn, maalox prn   -PPI IV bid, sucralfate , reglan OTC   pain control prn   3/3 tolerated regular diet. hgb 7.4. suspect intravascularly depleted on arrival. doubt active bleeding. will transfuse 1u PRBC today.   discussed with GI, no plan for EGD inpatient, as recent EGD 1/28/22 reviewed. Patient will need outpatient colonoscopy, discussed at length with patient and teach back method applied   3/4hgb improved to 9.1 after transfusion   H/H remained stable   tolerating regular diet   patient will be given 2 day supply of medications here --PPI bid and reglan Q6H and [facility] pharmacy will deliver medications to his home On Monday 3/7/22    reviewed EGD from 1/28/22 : recommendation was PPI,carafate. Plan was for EGD in 1 month and possible EUS of the GE juction (once inflammation improves) for further Bx   patient to follow-up with surgery and GI outpatient as recommended by prior admission at [facility])  Surgery  [address] [facility], [address]  [place] [address]  Phone: [phone]  Fax: [phone]  Follow Up Time:     [facility]  Gastroenterology  [address] [place] [address]  Phone: [phone]  Fax: [phone]    discussed at length with patient and follow-up information provided to patient , he expresses understanding   MARTIR MORRIS --PMD, seen 2/2022 who referred patient to EGD, patient will need outpatient repeat EGD/EUS guided bx and surgery eval outpatient,  patient is aware of all this and understands that he already has a follow-up appointment March 9th   Rx by PMD given for percocet and oxycodone 15mg ER bid, with naloxone . patient had 1 mo supply       #leukocytosis , most likely reactive and d/t hemoconcentration   hypernatremia , s/p IVF, Na normalized   WBC normalized     #Iron deficiency anemia   -3/3 s/p venofer x 1 , 1u PRBC   monitor H/H   anemia labs noted   folic acid, vit B12 wnl   TSH OK     # Chronic pancreatitis.    chronic ETOH abuse in the past  CTAP as above   lipase ok   continue with creon     #h/o ETOH abuse , not in ETOH w/d   -CIWA protocol , symptom trigger ativan prn   monitor for w/d   -folic acid/MVI/thiamine       # history of Hepatitis C virus infection   - hx of treatment in the past    #YUNIEL on CKD 4   metabolic acidosis in setting of CKD   - avoid nephrotoxic meds, dose all meds per eGFR    calcium acetate, NaHCO3.  Cr improved with IVF   outpatient follow-up     #Hypokalemia --repleted     Discharge time : 40 min     RETURN PARAMETERS DISCUSSED WITH PATIENT, PATIENT EXPRESSED UNDERSTANDING AND IS AGREEABLE. DISCUSSED WITH PATIENT ON REFRAINING FROM DRIVING UNTIL FOLLOW-UP/ CLEARED BY PMD. PATIENT EXPRESSED UNDERSTANDING.

## 2022-03-05 NOTE — DISCHARGE NOTE NURSING/CASE MANAGEMENT/SOCIAL WORK - NSDCFUADDAPPT_GEN_ALL_CORE_FT
It is important to see your primary physician as well as other necessary consultants within the next week to perform a comprehensive medical review.  Call their offices for an appointment as soon as you leave the hospital.  If you do not have a primary physician or cant reach him/her, contact the [facility] Physician Referral Service at [phone].  Your medical issues appear to be stable at this time, but if your symptoms recur or worsen, contact your physicians and/or return to the hospital if necessary.  If you encounter any issues or questions with your medication, call your physicians before stopping the medication.

## 2022-03-05 NOTE — DISCHARGE NOTE PROVIDER - NSDCFUADDAPPT_GEN_ALL_CORE_FT
It is important to see your primary physician as well as other necessary consultants within the next week to perform a comprehensive medical review.  Call their offices for an appointment as soon as you leave the hospital.  If you do not have a primary physician or cant reach him/her, contact the [facility] Physician Referral Service at [phone].  Your medical issues appear to be stable at this time, but if your symptoms recur or worsen, contact your physicians and/or return to the hospital if necessary.  If you encounter any issues or questions with your medication, call your physicians before stopping the medication.

## 2022-03-05 NOTE — DISCHARGE NOTE PROVIDER - PROVIDER TOKENS
PROVIDER:[TOKEN:[[id number]],SCHEDULEDAPPT:[03/09/2022]],PROVIDER:[TOKEN:[[id number]],FOLLOWUP:[1 week]],PROVIDER:[TOKEN:[[id number]],FOLLOWUP:[1 week]]

## 2022-03-05 NOTE — PROGRESS NOTE ADULT - SUBJECTIVE AND OBJECTIVE BOX
Pt stable - feeling better  No bleeding  H/H stable  Still with hiccups      MEDICATIONS  (STANDING):  calcium acetate 667 milliGRAM(s) Oral three times a day with meals  cyanocobalamin 1000 MICROGram(s) Oral daily  dextrose 5% + sodium chloride 0.45%. 1000 milliLiter(s) (75 mL/Hr) IV Continuous <Continuous>  folic acid 1 milliGRAM(s) Oral daily  gabapentin 200 milliGRAM(s) Oral daily  iron sucrose IVPB 100 milliGRAM(s) IV Intermittent every 24 hours  metoclopramide 10 milliGRAM(s) Oral four times a day  multivitamin 1 Tablet(s) Oral daily  oxyCODONE  ER Tablet 15 milliGRAM(s) Oral every 12 hours  pancrelipase  (CREON  6,000 Lipase Units) 2 Capsule(s) Oral three times a day with meals  pantoprazole    Tablet 40 milliGRAM(s) Oral two times a day  senna 2 Tablet(s) Oral at bedtime  sodium bicarbonate 650 milliGRAM(s) Oral every 6 hours  sucralfate suspension 1 Gram(s) Oral four times a day  tamsulosin 0.4 milliGRAM(s) Oral at bedtime  thiamine 100 milliGRAM(s) Oral daily    MEDICATIONS  (PRN):  acetaminophen     Tablet .. 650 milliGRAM(s) Oral every 6 hours PRN Mild Pain (1 - 3)  aluminum hydroxide/magnesium hydroxide/simethicone Suspension 30 milliLiter(s) Oral every 4 hours PRN Dyspepsia  melatonin 3 milliGRAM(s) Oral at bedtime PRN Insomnia  morphine  - Injectable 3 milliGRAM(s) IV Push every 4 hours PRN Severe Pain (7 - 10)  ondansetron Injectable 4 milliGRAM(s) IV Push every 8 hours PRN Nausea and/or Vomiting      Allergies    No Known Allergies    Intolerances        Vital Signs Last 24 Hrs  T(C): 36.3 (05 Mar 2022 16:43), Max: 37.3 (05 Mar 2022 11:12)  T(F): 97.3 (05 Mar 2022 16:43), Max: 99.1 (05 Mar 2022 11:12)  HR: 85 (05 Mar 2022 16:43) (76 - 85)  BP: 157/84 (05 Mar 2022 16:43) (134/78 - 157/84)  BP(mean): --  RR: 16 (05 Mar 2022 16:43) (16 - 18)  SpO2: 99% (05 Mar 2022 16:43) (96% - 99%)    PHYSICAL EXAM:  General: NAD.  CVS: S1, S2  Chest: air entry bilaterally present  Abd: BS present, soft, non-tender      LABS:                        9.2    9.40  )-----------( 323      ( 05 Mar 2022 08:37 )             29.6     03-05    145  |  119<H>  |  27<H>  ----------------------------<  151<H>  3.3<L>   |  20<L>  |  2.91<H>    Ca    8.4<L>      05 Mar 2022 08:04  Phos  2.5     03-04  Mg     1.9     03-04      H/H stable   agree with d/c plan  Pt to see surgeon at [facility]  Continue present meds for now  F/U with GI

## 2022-03-05 NOTE — DISCHARGE NOTE PROVIDER - NSDCCPCAREPLAN_GEN_ALL_CORE_FT
PRINCIPAL DISCHARGE DIAGNOSIS  Diagnosis: Epigastric pain  Assessment and Plan of Treatment:       SECONDARY DISCHARGE DIAGNOSES  Diagnosis: Pancreatitis, chronic  Assessment and Plan of Treatment:     Diagnosis: BPH (benign prostatic hyperplasia)  Assessment and Plan of Treatment:     Diagnosis: Acute kidney injury superimposed on CKD  Assessment and Plan of Treatment:     Diagnosis: Esophagitis  Assessment and Plan of Treatment:     Diagnosis: Hiatal hernia  Assessment and Plan of Treatment:

## 2022-03-05 NOTE — DISCHARGE NOTE PROVIDER - NSDCFUSCHEDAPPT_GEN_ALL_CORE_FT
IMELDA ROBERTSON ; 03/09/2022 ; [place] [address] [facility] [place]  IMELDA ROBERTSON ; 03/11/2022 ; NPP Med Int [address] [place] [address] IMELDA Nur ; 03/17/2022 ; [id number]

## 2022-03-05 NOTE — DISCHARGE NOTE NURSING/CASE MANAGEMENT/SOCIAL WORK - NSDCPEFALRISK_GEN_ALL_CORE
For information on Fall & Injury Prevention, visit: [email or web address] OR  [email or web address] OR  https://www.cdc.gov/steadi/patient.html

## 2022-03-05 NOTE — DISCHARGE NOTE NURSING/CASE MANAGEMENT/SOCIAL WORK - PATIENT PORTAL LINK FT
You can access the FollowMyHealth Patient Portal offered by [facility] by registering at the following website: [email or web address]. By joining [facility]’s FollowMyHealth portal, you will also be able to view your health information using other applications (apps) compatible with our system.

## 2022-03-05 NOTE — PROGRESS NOTE ADULT - PROVIDER SPECIALTY LIST ADULT
Gastroenterology
Hospitalist
Gastroenterology
Gastroenterology
Nephrology
Gastroenterology
Hospitalist
Hospitalist
Nephrology
Hospitalist

## 2022-03-05 NOTE — PROGRESS NOTE ADULT - SUBJECTIVE AND OBJECTIVE BOX
Patient is a 61y old  Male who presents with a chief complaint of coffee ground emesis (01 Mar 2022 17:32)        HPI:  61 years old male with h/o ETOH abuse, pancreatitis, esophagitis, hep C, CKD 4, present to ED with complain of abd pain, N/V and coffee ground emesisi. Patient reported severe, 10/10, sharp/burning epigastric pain, associated with multiple episode of coffee ground emesis.   Tachycardic to 116, BP stable upon ED arrival, HR improve to 107 after IV fluid. EKG with sinus tachy, , QTc 468. WBC 21.46, Hb 12.2 ( was 7-8 range in 01/2022), plt 450, lactate 1.3, K 4.1, Cr 4.23 ( 3.37 on 02/09/2022), hsTnT 11.1. CXR image reviewed, no focal consolidation. CT abd/pelvis with no acute pathology    SH: former ETOH use (01 Mar 2022 11:44)      SUBJECTIVE & OBJECTIVE:   Pt seen and examined at bedside.   no overnight events.   tolerating liquid diet. no vomiting . still complaining of epigastric pain     Denies fever, chills, N/V, dizziness, HA, cough, CP, palpitations, SOB,  dysuria, brbpr or melena       PHYSICAL EXAM:  Vital Signs Last 24 Hrs  T(C): 37 (05 Mar 2022 05:10), Max: 37.1 (04 Mar 2022 23:33)  T(F): 98.6 (05 Mar 2022 05:10), Max: 98.8 (04 Mar 2022 23:33)  HR: 76 (05 Mar 2022 05:10) (74 - 86)  BP: 134/78 (05 Mar 2022 05:10) (124/72 - 149/78)  BP(mean): --  RR: 18 (05 Mar 2022 05:10) (17 - 18)  SpO2: 99% (05 Mar 2022 05:10) (96% - 99%)        GENERAL: NAD, no increased WOB  HEAD:  Atraumatic, Normocephalic  EYES: EOMI, PERRLA, conjunctiva and sclera clear  ENMT: Moist mucous membranes  NECK: Supple, No JVD  NERVOUS SYSTEM:  Alert & Oriented X3, no focal neuro deficits   CHEST/LUNG: Clear to auscultation bilaterally; No rales, rhonchi, wheezing, or rubs  HEART: Regular rate and rhythm; No murmurs, rubs, or gallops  ABDOMEN: Soft, epigastric tenderness on palpation,  Nondistended; Bowel sounds present, no guarding or rigidity   EXTREMITIES:  2+ Peripheral Pulses b/l, No clubbing, cyanosis, calf tenderness or edema b/l    MEDICATIONS  (STANDING):  calcium acetate 667 milliGRAM(s) Oral three times a day with meals  cyanocobalamin 1000 MICROGram(s) Oral daily  folic acid 1 milliGRAM(s) Oral daily  gabapentin 200 milliGRAM(s) Oral daily  lactated ringers. 1000 milliLiter(s) (125 mL/Hr) IV Continuous <Continuous>  multivitamin 1 Tablet(s) Oral daily  pancrelipase  (CREON  6,000 Lipase Units) 2 Capsule(s) Oral three times a day with meals  pantoprazole  Injectable 40 milliGRAM(s) IV Push every 12 hours  senna 2 Tablet(s) Oral at bedtime  sodium bicarbonate 650 milliGRAM(s) Oral every 6 hours  sucralfate suspension 1 Gram(s) Oral four times a day  tamsulosin 0.4 milliGRAM(s) Oral at bedtime  thiamine 100 milliGRAM(s) Oral daily    MEDICATIONS  (PRN):  acetaminophen     Tablet .. 650 milliGRAM(s) Oral every 6 hours PRN Mild Pain (1 - 3), Moderate Pain (4 - 6)  aluminum hydroxide/magnesium hydroxide/simethicone Suspension 30 milliLiter(s) Oral every 4 hours PRN Dyspepsia  melatonin 3 milliGRAM(s) Oral at bedtime PRN Insomnia  ondansetron Injectable 4 milliGRAM(s) IV Push every 8 hours PRN Nausea and/or Vomiting  oxyCODONE    IR 5 milliGRAM(s) Oral every 6 hours PRN Severe Pain (7 - 10)      LABS:                                   9.1    9.92  )-----------( 297      ( 04 Mar 2022 07:23 )             29.6   03-04    148<H>  |  120<H>  |  28<H>  ----------------------------<  115<H>  3.6   |  24  |  3.06<H>    Ca    8.3<L>      04 Mar 2022 07:23  Phos  2.5     03-04  Mg     1.9     03-04            Magnesium, Serum: 2.3 mg/dL (03-01 @ 18:34)    CAPILLARY BLOOD GLUCOSE                RECENT CULTURES:      RADIOLOGY & ADDITIONAL TESTS:  < from: Xray Chest 1 View- PORTABLE-Routine (Xray Chest 1 View- PORTABLE-Routine .) (03.01.22 @ 06:25) >  INTERPRETATION:  AP semierect chest on March 1, 2022 at 6:13 AM. 2   images. Patient has chest pain.    Heart size is within normal limits.    The lung fields and pleural surfaces are unremarkable.    Chest is similar to January 26 of this year.    IMPRESSION: No acute finding or change.    < end of copied text >    < from: CT Abdomen and Pelvis No Cont (03.01.22 @ 10:29) >    ACC: [id number] EXAM:  CT ABDOMEN AND PELVIS                          PROCEDURE DATE:  03/01/2022          INTERPRETATION:  CLINICAL INFORMATION: Epigastric abdominal pain    COMPARISON: 1/25/2022    CONTRAST/COMPLICATIONS:  IV Contrast: None  Oral Contrast: None  Complications: None    PROCEDURE:  CT of the Abdomen and Pelvis was performed.  Sagittal and coronal reformats were performed.    FINDINGS:  LOWER CHEST: Clear lung bases.    Please note that evaluation of the abdominal organs and vascular   structures is limited by lack of intravenous contrast.    LIVER: Within normal limits.  BILE DUCTS: Normal caliber.  GALLBLADDER: Within normal limits.  SPLEEN: Within normal limits.  PANCREAS: Coarse pancreatic calcifications. Stable pancreaticductal   dilatation measuring up to 1.6 cm.  ADRENALS: Within normal limits.  KIDNEYS/URETERS: Left renal hypodensity, possibly cyst. No hydronephrosis.    BLADDER: Within normal limits.  REPRODUCTIVE ORGANS: Prostate is mildly enlarged.    BOWEL: Nobowel obstruction. Appendix is within normal limits.  PERITONEUM: No ascites.  VESSELS: Atherosclerotic calcifications.  RETROPERITONEUM/LYMPH NODES: No lymphadenopathy.  ABDOMINAL WALL: Postsurgical changes.  BONES: Degenerative changes. Left sacroiliac fusion. Stable left iliac   lytic lesion.    IMPRESSION:  Etiology of abdominal pain is not elucidated.    < end of copied text >    Historical Values  Surgical Pathology Report:   ACCESSION No: [id number]   Patient: IMELDA CHAUDHARI   Accession: [id number]   Collected Date/Time: 1/28/2022 12:46 EST   Received Date/Time: 1/31/2022 12:46 EST   Surgical Pathology Report - Auth (Verified)   Specimen(s) Submitted   1- Stomach antrum body   2- GE Junction   Final Diagnosis   1. Stomach, antrum and body, biopsy:   - Gastric antral mucosa with mild chronic focal active gastritis   - Gastric oxyntic mucosa with no significant histopathologic findings   - No morphologic evidence of Helicobacter microorganisms   - Negative for intestinal metaplasia   2. GE Junction, biopsy:   - Squamocolumnar mucosa with ulcer, granulation tissue, active   esophagitis and active carditis   - Negative for fungal organisms (GMS stain)   - Negative for intestinal metaplasia Surgical Pathology Report:   ACCESSION No: [id number]   Patient: IMELDA CHAUDHARI   Accession: [id number]   Collected Date/Time: 12/23/2021 15:50 EST   Received Date/Time: 12/27/2021 11:59 EST   Surgical Pathology Report - Auth (Verified)   Specimen(s) Submitted   1 Duodenum, biopsy   2 Antrum biopsy   Final Diagnosis   1. Duodenum, biopsy:   - Duodenal mucosa with mild Brunner's gland hyperplasia and focal   cystically dilated gland. Negative for Helicobacter pylori (cresyl   violet stain   2. Gastric antrum biopsy:   - Mild chronic antral gastritis   - Negative for Helicobacter pylori (cresyl violet stain     < from: Upper Endoscopy (01.28.22 @ 13:59) >  [facility]  _______________________________________________________________________________  Patient Name: Imelda Chaudhari        Procedure Date: 1/28/2022 1:59 PM  MRN: [id number]                     Account Number: [id number]  YOB: 1960              Admit Type: Inpatient  Room: [facility] 03                         Gender: Male  Attending MD: Good Powell MD      _______________________________________________________________________________     Procedure:           Upper GI endoscopy  Indications:         Abnormal cine-esophagram  Providers:           Good Powell MD  Medicines:           Monitored Anesthesia Care  Complications:       No immediate complications.  Procedure:           After obtaining informed consent, the endoscope was                        passed under direct vision. Throughout the procedure,                        the patient's blood pressure, pulse, and oxygen                        saturations were monitored continuously. The Endoscope                        was introduced through the mouth, and advanced to the                        third part of duodenum. The upper GI endoscopy was                        accomplished without difficulty. The patient tolerated                   the procedure well.                                                                                   Findings:       LA Grade D (one or more mucosal breaks involving at least 75% of        esophageal circumference) esophagitis with no bleeding was found 33 to        37 cm from the incisors. ? islands of Barretts's esophagus.       Mucosal changes characterized by discoloration, erythema and nodularity        were found at the gastroesophageal junction. Biopsies were taken with a        cold forceps for histology.       Mild extrinsic compression on the stomach was found in the gastric        fundus.       Inflammation characterized by erythema was found in the gastric body and        in the gastric antrum. Biopsies were takenwith a cold forceps for        Helicobacter pylori testing.       The examined duodenum was normal.                                                                                   Moderate Sedation:       Moderate (conscious) sedation was personally administered by an        anesthesia professional. The following parameters were monitored: oxygen        saturation, heart rate, blood pressure, respiratory rate, EKG, adequacy        of pulmonary ventilation, and response to care.  Impression:     - LA Grade D reflux esophagitis. Rule out Howard's                        esophagus.                       - Discolored, erythematous, nodular mucosa in the                        esophagus. Biopsied.                       - Extrinsic compression in the gastric fundus.                       - Gastritis. Biopsied.                       - Normal examined duodenum.  Recommendation:      - [facility].                       - Advance diet as tolerated.                  - Await pathology results.                       -Suggest High dose PPI to decrease degree of                        esophagitits. Will need repeat EGD within a month for                        furhter biopsies and possible EUS of theGE juction                        (once inflammation is improved)                       -Suggest Carafate Slurry 1 gram TID    < end of copied text >        Imaging Personally Reviewed:  [ ] YES  [ ] NO    Consultant(s) Notes Reviewed:  [x ] YES  [ ] NO    Care Discussed with Consultants/Other Providers [x ] YES  [ ] NO  Care discussed in detail with patient and mother Dede.  All questions and concerns addressed

## 2022-03-06 LAB
CULTURE RESULTS: SIGNIFICANT CHANGE UP
CULTURE RESULTS: SIGNIFICANT CHANGE UP
HCV RNA FLD QL NAA+PROBE: SIGNIFICANT CHANGE UP
SPECIMEN SOURCE: SIGNIFICANT CHANGE UP
SPECIMEN SOURCE: SIGNIFICANT CHANGE UP

## 2022-03-09 ENCOUNTER — OUTPATIENT (OUTPATIENT)
Dept: OUTPATIENT SERVICES | Facility: HOSPITAL | Age: 62
LOS: 1 days | End: 2022-03-09

## 2022-03-09 ENCOUNTER — APPOINTMENT (OUTPATIENT)
Dept: INTERNAL MEDICINE | Facility: CLINIC | Age: 62
End: 2022-03-09
Payer: MEDICAID

## 2022-03-09 VITALS
HEIGHT: 73 IN | SYSTOLIC BLOOD PRESSURE: 120 MMHG | WEIGHT: 133.25 LBS | DIASTOLIC BLOOD PRESSURE: 80 MMHG | OXYGEN SATURATION: 99 % | TEMPERATURE: 97.7 F | BODY MASS INDEX: 17.66 KG/M2 | HEART RATE: 65 BPM

## 2022-03-09 DIAGNOSIS — K25.5 CHRONIC OR UNSPECIFIED GASTRIC ULCER WITH PERFORATION: Chronic | ICD-10-CM

## 2022-03-09 PROCEDURE — 99213 OFFICE O/P EST LOW 20 MIN: CPT

## 2022-03-09 RX ORDER — OXYCODONE AND ACETAMINOPHEN 5; 325 MG/1; MG/1
5-325 TABLET ORAL
Qty: 84 | Refills: 0 | Status: DISCONTINUED | COMMUNITY
Start: 2022-02-09 | End: 2022-03-09

## 2022-03-09 RX ORDER — OXYCODONE HYDROCHLORIDE 15 MG/1
15 TABLET, FILM COATED, EXTENDED RELEASE ORAL
Qty: 42 | Refills: 0 | Status: DISCONTINUED | COMMUNITY
Start: 2022-02-16 | End: 2022-03-09

## 2022-03-10 DIAGNOSIS — K44.9 DIAPHRAGMATIC HERNIA WITHOUT OBSTRUCTION OR GANGRENE: ICD-10-CM

## 2022-03-10 DIAGNOSIS — Z79.899 OTHER LONG TERM (CURRENT) DRUG THERAPY: ICD-10-CM

## 2022-03-10 DIAGNOSIS — D72.829 ELEVATED WHITE BLOOD CELL COUNT, UNSPECIFIED: ICD-10-CM

## 2022-03-10 DIAGNOSIS — K86.1 OTHER CHRONIC PANCREATITIS: ICD-10-CM

## 2022-03-10 DIAGNOSIS — R00.0 TACHYCARDIA, UNSPECIFIED: ICD-10-CM

## 2022-03-10 DIAGNOSIS — N40.0 BENIGN PROSTATIC HYPERPLASIA WITHOUT LOWER URINARY TRACT SYMPTOMS: ICD-10-CM

## 2022-03-10 DIAGNOSIS — F10.11 ALCOHOL ABUSE, IN REMISSION: ICD-10-CM

## 2022-03-10 DIAGNOSIS — R10.13 EPIGASTRIC PAIN: ICD-10-CM

## 2022-03-10 DIAGNOSIS — E87.6 HYPOKALEMIA: ICD-10-CM

## 2022-03-10 DIAGNOSIS — D50.9 IRON DEFICIENCY ANEMIA, UNSPECIFIED: ICD-10-CM

## 2022-03-10 DIAGNOSIS — M10.9 GOUT, UNSPECIFIED: ICD-10-CM

## 2022-03-10 DIAGNOSIS — K76.0 FATTY (CHANGE OF) LIVER, NOT ELSEWHERE CLASSIFIED: ICD-10-CM

## 2022-03-10 DIAGNOSIS — Z86.19 PERSONAL HISTORY OF OTHER INFECTIOUS AND PARASITIC DISEASES: ICD-10-CM

## 2022-03-10 DIAGNOSIS — N17.9 ACUTE KIDNEY FAILURE, UNSPECIFIED: ICD-10-CM

## 2022-03-10 DIAGNOSIS — E87.0 HYPEROSMOLALITY AND HYPERNATREMIA: ICD-10-CM

## 2022-03-10 DIAGNOSIS — E87.2 ACIDOSIS: ICD-10-CM

## 2022-03-10 DIAGNOSIS — Z20.822 CONTACT WITH AND (SUSPECTED) EXPOSURE TO COVID-19: ICD-10-CM

## 2022-03-10 DIAGNOSIS — N18.4 CHRONIC KIDNEY DISEASE, STAGE 4 (SEVERE): ICD-10-CM

## 2022-03-10 DIAGNOSIS — K21.00 GASTRO-ESOPHAGEAL REFLUX DISEASE WITH ESOPHAGITIS, WITHOUT BLEEDING: ICD-10-CM

## 2022-03-10 NOTE — PHYSICAL THERAPY INITIAL EVALUATION ADULT - ASSISTIVE DEVICE FOR TRANSFER: STAND/SIT, REHAB EVAL
rolling walker Sarecycline Counseling: Patient advised regarding possible photosensitivity and discoloration of the teeth, skin, lips, tongue and gums.  Patient instructed to avoid sunlight, if possible.  When exposed to sunlight, patients should wear protective clothing, sunglasses, and sunscreen.  The patient was instructed to call the office immediately if the following severe adverse effects occur:  hearing changes, easy bruising/bleeding, severe headache, or vision changes.  The patient verbalized understanding of the proper use and possible adverse effects of sarecycline.  All of the patient's questions and concerns were addressed.

## 2022-03-16 ENCOUNTER — NON-APPOINTMENT (OUTPATIENT)
Age: 62
End: 2022-03-16

## 2022-03-17 ENCOUNTER — APPOINTMENT (OUTPATIENT)
Dept: GASTROENTEROLOGY | Facility: CLINIC | Age: 62
End: 2022-03-17

## 2022-03-23 ENCOUNTER — APPOINTMENT (OUTPATIENT)
Dept: INTERNAL MEDICINE | Facility: CLINIC | Age: 62
End: 2022-03-23
Payer: MEDICAID

## 2022-03-23 ENCOUNTER — OUTPATIENT (OUTPATIENT)
Dept: OUTPATIENT SERVICES | Facility: HOSPITAL | Age: 62
LOS: 1 days | End: 2022-03-23

## 2022-03-23 VITALS
TEMPERATURE: 98 F | WEIGHT: 129 LBS | BODY MASS INDEX: 17.1 KG/M2 | DIASTOLIC BLOOD PRESSURE: 80 MMHG | SYSTOLIC BLOOD PRESSURE: 130 MMHG | OXYGEN SATURATION: 98 % | HEIGHT: 73 IN | HEART RATE: 73 BPM

## 2022-03-23 DIAGNOSIS — F11.90 OPIOID USE, UNSPECIFIED, UNCOMPLICATED: ICD-10-CM

## 2022-03-23 DIAGNOSIS — K25.5 CHRONIC OR UNSPECIFIED GASTRIC ULCER WITH PERFORATION: Chronic | ICD-10-CM

## 2022-03-23 DIAGNOSIS — N18.4 CHRONIC KIDNEY DISEASE, STAGE 4 (SEVERE): ICD-10-CM

## 2022-03-23 DIAGNOSIS — K86.1 OTHER CHRONIC PANCREATITIS: ICD-10-CM

## 2022-03-23 PROCEDURE — 99213 OFFICE O/P EST LOW 20 MIN: CPT

## 2022-03-23 NOTE — REVIEW OF SYSTEMS
[Abdominal Pain] : abdominal pain [Nausea] : no nausea [Vomiting] : no vomiting [Negative] : Respiratory [FreeTextEntry7] : Positive for hiccups

## 2022-03-23 NOTE — HISTORY OF PRESENT ILLNESS
[FreeTextEntry1] : Pain Control Follow up [de-identified] : Patient is a 61 y.o M with PMHx of ETOH abuse (quit about 4-5 years ago), chronic pancreatitis, Hep C (past history of treatment), gout, gastric perf in 2019 (s/p repair) presenting for pain control follow up. \par \par At last visit patient noted that percocet did improve pain and function enough to the point he could do simple tasks such as watch television and write but noted that the pain control only lasted a few hours and that he felt he needed more than he was getting. At that visit we discussed trialing of Oxycodone ER prescription which patient was agreeable to. Patient has been taking ER oxycodone for a few days now and notes that he does not find it to be working at all. PEG scale average of 10 across pain, function, and enjoyment. States the medication also makes him drowsy without reducing the pain. When reviewing initial discharge medication from ER in Feb patient notes the percocet 2 tablets q6h was better at controlling pain and improving function. Patient noted he was even able to play guitar if he took a couple of extra percocet. Patient denied running out of medication early while on that regimen. On current regimen patient noted to have been taking more than prescribed. Upon discussion of risks vs benefits decided on switching back to percocet dosing (60 MME) of 2 tablets q6h. \par \par Patient was unable to follow up with Pain medicine as they stated the doctor was not available. \par Also discussed ordering home health referral to assist with getting surgical, GI, nephrology, and pain medicine follow up. Patient was in agreement

## 2022-03-25 DIAGNOSIS — F11.90 OPIOID USE, UNSPECIFIED, UNCOMPLICATED: ICD-10-CM

## 2022-03-25 NOTE — REVIEW OF SYSTEMS
[Abdominal Pain] : abdominal pain [Nausea] : no nausea [Vomiting] : no vomiting [Negative] : Respiratory [FreeTextEntry7] : Hiccups

## 2022-03-25 NOTE — HISTORY OF PRESENT ILLNESS
[FreeTextEntry1] : Pain follow up [de-identified] : Patient is a 61 y.o M with PMHx of ETOH abuse (quit about 4-5 years ago), chronic pancreatitis, Hep C (past history of treatment), gout, gastric perf in 2019 (s/p repair) presenting for pain control follow up. \par \par At last visit the patient was increased to 60 MME with original 2 5mg Percocets q6h regimen he felt somewhat decently controlled his pain after hospitalization in January. This occurred after attempted decrease and subsequent incremental increases. Patient today says pain is better controlled on current regimen. On PEG scale scoreing patient still noted to rank 10 across pain, interference with function, and enjoyement of life. Of note however, patient says with current regimen that he is able to watch TV, Read/Write, and even play guitar. So there is objective improvement in functionality at current dosing. Discussed risks of increased dosing. Stressed importance of training family and friends in use of Narcan. Stressed appointment with Pain medicine for extra consultation\par \par Due to Prior Auth patient went without medication for a couple of days and notes he took extra pills when he first received percocet and thus only has 4 more days of medication despite picking up 2 week medicine a week ago. Discussed the importance of timing with medfications and that if the patient overuses medication that can be a starting sign of OUD. Discussed that it is important to stick to the schedule without using extra medication as once he runs out he is likely to be unable to get the medication again until he is 2 weeks out from prior pick. Noted that this could lead to a cycle of overuse and increased pain. Patient expressed understanding.

## 2022-03-28 ENCOUNTER — APPOINTMENT (OUTPATIENT)
Dept: INTERNAL MEDICINE | Facility: CLINIC | Age: 62
End: 2022-03-28

## 2022-04-06 ENCOUNTER — INPATIENT (INPATIENT)
Facility: HOSPITAL | Age: 62
LOS: 6 days | Discharge: ANOTHER HOSPITAL | End: 2022-04-13
Attending: STUDENT IN AN ORGANIZED HEALTH CARE EDUCATION/TRAINING PROGRAM | Admitting: STUDENT IN AN ORGANIZED HEALTH CARE EDUCATION/TRAINING PROGRAM
Payer: COMMERCIAL

## 2022-04-06 VITALS
SYSTOLIC BLOOD PRESSURE: 123 MMHG | RESPIRATION RATE: 19 BRPM | HEIGHT: 73 IN | DIASTOLIC BLOOD PRESSURE: 86 MMHG | OXYGEN SATURATION: 98 % | WEIGHT: 126.99 LBS | HEART RATE: 85 BPM | TEMPERATURE: 98 F

## 2022-04-06 DIAGNOSIS — N17.9 ACUTE KIDNEY FAILURE, UNSPECIFIED: ICD-10-CM

## 2022-04-06 DIAGNOSIS — K25.5 CHRONIC OR UNSPECIFIED GASTRIC ULCER WITH PERFORATION: Chronic | ICD-10-CM

## 2022-04-06 DIAGNOSIS — K86.1 OTHER CHRONIC PANCREATITIS: ICD-10-CM

## 2022-04-06 DIAGNOSIS — E43 UNSPECIFIED SEVERE PROTEIN-CALORIE MALNUTRITION: ICD-10-CM

## 2022-04-06 DIAGNOSIS — R63.4 ABNORMAL WEIGHT LOSS: ICD-10-CM

## 2022-04-06 LAB
ALBUMIN SERPL ELPH-MCNC: 3.7 G/DL — SIGNIFICANT CHANGE UP (ref 3.3–5)
ALP SERPL-CCNC: 126 U/L — HIGH (ref 40–120)
ALT FLD-CCNC: 14 U/L — SIGNIFICANT CHANGE UP (ref 12–78)
ANION GAP SERPL CALC-SCNC: 9 MMOL/L — SIGNIFICANT CHANGE UP (ref 5–17)
APTT BLD: 32.7 SEC — SIGNIFICANT CHANGE UP (ref 27.5–35.5)
AST SERPL-CCNC: 27 U/L — SIGNIFICANT CHANGE UP (ref 15–37)
BASOPHILS # BLD AUTO: 0.08 K/UL — SIGNIFICANT CHANGE UP (ref 0–0.2)
BASOPHILS NFR BLD AUTO: 0.8 % — SIGNIFICANT CHANGE UP (ref 0–2)
BILIRUB SERPL-MCNC: 0.3 MG/DL — SIGNIFICANT CHANGE UP (ref 0.2–1.2)
BLD GP AB SCN SERPL QL: SIGNIFICANT CHANGE UP
BUN SERPL-MCNC: 61 MG/DL — HIGH (ref 7–23)
CALCIUM SERPL-MCNC: 8.5 MG/DL — SIGNIFICANT CHANGE UP (ref 8.5–10.1)
CHLORIDE SERPL-SCNC: 105 MMOL/L — SIGNIFICANT CHANGE UP (ref 96–108)
CO2 SERPL-SCNC: 25 MMOL/L — SIGNIFICANT CHANGE UP (ref 22–31)
CREAT SERPL-MCNC: 5.45 MG/DL — HIGH (ref 0.5–1.3)
EGFR: 11 ML/MIN/1.73M2 — LOW
EOSINOPHIL # BLD AUTO: 0 K/UL — SIGNIFICANT CHANGE UP (ref 0–0.5)
EOSINOPHIL NFR BLD AUTO: 0 % — SIGNIFICANT CHANGE UP (ref 0–6)
FLUAV AG NPH QL: SIGNIFICANT CHANGE UP
FLUBV AG NPH QL: SIGNIFICANT CHANGE UP
GLUCOSE SERPL-MCNC: 101 MG/DL — HIGH (ref 70–99)
HAV IGM SER-ACNC: SIGNIFICANT CHANGE UP
HBV CORE IGM SER-ACNC: SIGNIFICANT CHANGE UP
HBV SURFACE AG SER-ACNC: SIGNIFICANT CHANGE UP
HCT VFR BLD CALC: 49.1 % — SIGNIFICANT CHANGE UP (ref 39–50)
HCV AB S/CO SERPL IA: 8.64 S/CO — HIGH (ref 0–0.99)
HCV AB SERPL-IMP: REACTIVE
HGB BLD-MCNC: 15.4 G/DL — SIGNIFICANT CHANGE UP (ref 13–17)
HIV 1 & 2 AB SERPL IA.RAPID: SIGNIFICANT CHANGE UP
IMM GRANULOCYTES NFR BLD AUTO: 0.3 % — SIGNIFICANT CHANGE UP (ref 0–1.5)
INR BLD: 0.96 RATIO — SIGNIFICANT CHANGE UP (ref 0.88–1.16)
LACTATE SERPL-SCNC: 1.4 MMOL/L — SIGNIFICANT CHANGE UP (ref 0.7–2)
LIDOCAIN IGE QN: 119 U/L — SIGNIFICANT CHANGE UP (ref 73–393)
LYMPHOCYTES # BLD AUTO: 1.96 K/UL — SIGNIFICANT CHANGE UP (ref 1–3.3)
LYMPHOCYTES # BLD AUTO: 19.5 % — SIGNIFICANT CHANGE UP (ref 13–44)
MCHC RBC-ENTMCNC: 28.3 PG — SIGNIFICANT CHANGE UP (ref 27–34)
MCHC RBC-ENTMCNC: 31.4 G/DL — LOW (ref 32–36)
MCV RBC AUTO: 90.3 FL — SIGNIFICANT CHANGE UP (ref 80–100)
MONOCYTES # BLD AUTO: 0.45 K/UL — SIGNIFICANT CHANGE UP (ref 0–0.9)
MONOCYTES NFR BLD AUTO: 4.5 % — SIGNIFICANT CHANGE UP (ref 2–14)
NEUTROPHILS # BLD AUTO: 7.51 K/UL — HIGH (ref 1.8–7.4)
NEUTROPHILS NFR BLD AUTO: 74.9 % — SIGNIFICANT CHANGE UP (ref 43–77)
NRBC # BLD: 0 /100 WBCS — SIGNIFICANT CHANGE UP (ref 0–0)
PLATELET # BLD AUTO: 310 K/UL — SIGNIFICANT CHANGE UP (ref 150–400)
POTASSIUM SERPL-MCNC: 4.4 MMOL/L — SIGNIFICANT CHANGE UP (ref 3.5–5.3)
POTASSIUM SERPL-SCNC: 4.4 MMOL/L — SIGNIFICANT CHANGE UP (ref 3.5–5.3)
PROT SERPL-MCNC: 8.2 GM/DL — SIGNIFICANT CHANGE UP (ref 6–8.3)
PROTHROM AB SERPL-ACNC: 11.4 SEC — SIGNIFICANT CHANGE UP (ref 10.5–13.4)
RBC # BLD: 5.44 M/UL — SIGNIFICANT CHANGE UP (ref 4.2–5.8)
RBC # FLD: 18.5 % — HIGH (ref 10.3–14.5)
SARS-COV-2 RNA SPEC QL NAA+PROBE: SIGNIFICANT CHANGE UP
SODIUM SERPL-SCNC: 139 MMOL/L — SIGNIFICANT CHANGE UP (ref 135–145)
TROPONIN I, HIGH SENSITIVITY RESULT: 8.3 NG/L — SIGNIFICANT CHANGE UP
WBC # BLD: 10.03 K/UL — SIGNIFICANT CHANGE UP (ref 3.8–10.5)
WBC # FLD AUTO: 10.03 K/UL — SIGNIFICANT CHANGE UP (ref 3.8–10.5)

## 2022-04-06 PROCEDURE — 71045 X-RAY EXAM CHEST 1 VIEW: CPT | Mod: 26

## 2022-04-06 PROCEDURE — 99223 1ST HOSP IP/OBS HIGH 75: CPT

## 2022-04-06 PROCEDURE — 99285 EMERGENCY DEPT VISIT HI MDM: CPT

## 2022-04-06 PROCEDURE — 74176 CT ABD & PELVIS W/O CONTRAST: CPT | Mod: 26,MC

## 2022-04-06 PROCEDURE — 93010 ELECTROCARDIOGRAM REPORT: CPT

## 2022-04-06 RX ORDER — MORPHINE SULFATE 50 MG/1
4 CAPSULE, EXTENDED RELEASE ORAL ONCE
Refills: 0 | Status: DISCONTINUED | OUTPATIENT
Start: 2022-04-06 | End: 2022-04-06

## 2022-04-06 RX ORDER — LANOLIN ALCOHOL/MO/W.PET/CERES
3 CREAM (GRAM) TOPICAL AT BEDTIME
Refills: 0 | Status: DISCONTINUED | OUTPATIENT
Start: 2022-04-06 | End: 2022-04-13

## 2022-04-06 RX ORDER — GABAPENTIN 400 MG/1
200 CAPSULE ORAL AT BEDTIME
Refills: 0 | Status: DISCONTINUED | OUTPATIENT
Start: 2022-04-06 | End: 2022-04-13

## 2022-04-06 RX ORDER — METOCLOPRAMIDE HCL 10 MG
10 TABLET ORAL
Refills: 0 | Status: DISCONTINUED | OUTPATIENT
Start: 2022-04-06 | End: 2022-04-13

## 2022-04-06 RX ORDER — SODIUM CHLORIDE 9 MG/ML
1000 INJECTION, SOLUTION INTRAVENOUS
Refills: 0 | Status: DISCONTINUED | OUTPATIENT
Start: 2022-04-06 | End: 2022-04-10

## 2022-04-06 RX ORDER — OXYCODONE HYDROCHLORIDE 5 MG/1
5 TABLET ORAL EVERY 8 HOURS
Refills: 0 | Status: DISCONTINUED | OUTPATIENT
Start: 2022-04-06 | End: 2022-04-07

## 2022-04-06 RX ORDER — FOLIC ACID 0.8 MG
1 TABLET ORAL DAILY
Refills: 0 | Status: DISCONTINUED | OUTPATIENT
Start: 2022-04-06 | End: 2022-04-13

## 2022-04-06 RX ORDER — DULOXETINE HYDROCHLORIDE 30 MG/1
30 CAPSULE, DELAYED RELEASE PELLETS ORAL DAILY
Qty: 21 | Refills: 0 | Status: DISCONTINUED | COMMUNITY
Start: 2022-02-16 | End: 2022-04-06

## 2022-04-06 RX ORDER — SODIUM CHLORIDE 9 MG/ML
1000 INJECTION INTRAMUSCULAR; INTRAVENOUS; SUBCUTANEOUS ONCE
Refills: 0 | Status: COMPLETED | OUTPATIENT
Start: 2022-04-06 | End: 2022-04-06

## 2022-04-06 RX ORDER — FAMOTIDINE 10 MG/ML
20 INJECTION INTRAVENOUS ONCE
Refills: 0 | Status: COMPLETED | OUTPATIENT
Start: 2022-04-06 | End: 2022-04-06

## 2022-04-06 RX ORDER — TAMSULOSIN HYDROCHLORIDE 0.4 MG/1
0.4 CAPSULE ORAL AT BEDTIME
Refills: 0 | Status: DISCONTINUED | OUTPATIENT
Start: 2022-04-06 | End: 2022-04-13

## 2022-04-06 RX ORDER — SENNA PLUS 8.6 MG/1
2 TABLET ORAL AT BEDTIME
Refills: 0 | Status: DISCONTINUED | OUTPATIENT
Start: 2022-04-06 | End: 2022-04-13

## 2022-04-06 RX ORDER — MORPHINE SULFATE 50 MG/1
2 CAPSULE, EXTENDED RELEASE ORAL ONCE
Refills: 0 | Status: DISCONTINUED | OUTPATIENT
Start: 2022-04-06 | End: 2022-04-06

## 2022-04-06 RX ORDER — ACETAMINOPHEN 500 MG
650 TABLET ORAL EVERY 6 HOURS
Refills: 0 | Status: DISCONTINUED | OUTPATIENT
Start: 2022-04-06 | End: 2022-04-13

## 2022-04-06 RX ORDER — PANTOPRAZOLE SODIUM 20 MG/1
40 TABLET, DELAYED RELEASE ORAL
Refills: 0 | Status: DISCONTINUED | OUTPATIENT
Start: 2022-04-06 | End: 2022-04-08

## 2022-04-06 RX ORDER — SUCRALFATE 1 G
1 TABLET ORAL
Refills: 0 | Status: DISCONTINUED | OUTPATIENT
Start: 2022-04-06 | End: 2022-04-08

## 2022-04-06 RX ORDER — LIPASE/PROTEASE/AMYLASE 16-48-48K
2 CAPSULE,DELAYED RELEASE (ENTERIC COATED) ORAL
Refills: 0 | Status: DISCONTINUED | OUTPATIENT
Start: 2022-04-06 | End: 2022-04-13

## 2022-04-06 RX ORDER — THIAMINE MONONITRATE (VIT B1) 100 MG
100 TABLET ORAL DAILY
Refills: 0 | Status: DISCONTINUED | OUTPATIENT
Start: 2022-04-06 | End: 2022-04-13

## 2022-04-06 RX ADMIN — GABAPENTIN 200 MILLIGRAM(S): 400 CAPSULE ORAL at 21:25

## 2022-04-06 RX ADMIN — Medication 1 TABLET(S): at 17:54

## 2022-04-06 RX ADMIN — PANTOPRAZOLE SODIUM 40 MILLIGRAM(S): 20 TABLET, DELAYED RELEASE ORAL at 17:54

## 2022-04-06 RX ADMIN — SODIUM CHLORIDE 125 MILLILITER(S): 9 INJECTION, SOLUTION INTRAVENOUS at 18:28

## 2022-04-06 RX ADMIN — SODIUM CHLORIDE 1000 MILLILITER(S): 9 INJECTION INTRAMUSCULAR; INTRAVENOUS; SUBCUTANEOUS at 14:00

## 2022-04-06 RX ADMIN — Medication 10 MILLIGRAM(S): at 23:41

## 2022-04-06 RX ADMIN — MORPHINE SULFATE 4 MILLIGRAM(S): 50 CAPSULE, EXTENDED RELEASE ORAL at 14:08

## 2022-04-06 RX ADMIN — TAMSULOSIN HYDROCHLORIDE 0.4 MILLIGRAM(S): 0.4 CAPSULE ORAL at 21:26

## 2022-04-06 RX ADMIN — Medication 2 CAPSULE(S): at 18:24

## 2022-04-06 RX ADMIN — Medication 1 MILLIGRAM(S): at 17:54

## 2022-04-06 RX ADMIN — MORPHINE SULFATE 2 MILLIGRAM(S): 50 CAPSULE, EXTENDED RELEASE ORAL at 21:40

## 2022-04-06 RX ADMIN — MORPHINE SULFATE 4 MILLIGRAM(S): 50 CAPSULE, EXTENDED RELEASE ORAL at 10:57

## 2022-04-06 RX ADMIN — Medication 10 MILLIGRAM(S): at 17:54

## 2022-04-06 RX ADMIN — Medication 100 MILLIGRAM(S): at 18:02

## 2022-04-06 RX ADMIN — SODIUM CHLORIDE 1000 MILLILITER(S): 9 INJECTION INTRAMUSCULAR; INTRAVENOUS; SUBCUTANEOUS at 10:58

## 2022-04-06 RX ADMIN — MORPHINE SULFATE 2 MILLIGRAM(S): 50 CAPSULE, EXTENDED RELEASE ORAL at 21:25

## 2022-04-06 RX ADMIN — SENNA PLUS 2 TABLET(S): 8.6 TABLET ORAL at 21:26

## 2022-04-06 RX ADMIN — Medication 1 GRAM(S): at 23:41

## 2022-04-06 RX ADMIN — FAMOTIDINE 20 MILLIGRAM(S): 10 INJECTION INTRAVENOUS at 10:58

## 2022-04-06 NOTE — ED PROVIDER NOTE - PROGRESS NOTE DETAILS
W/u significant for: CT w/ chronic pancreatitis, Cr 5.4 (baseline ~3). Will admit to medicine (d/w Dr Cuellar). Pt updated to results, admission. He understands / agrees w/ this plan.

## 2022-04-06 NOTE — PATIENT PROFILE ADULT - FUNCTIONAL ASSESSMENT - DAILY ACTIVITY SCORE.
Patient Instructions by Casey Ann MD at 2/15/2021  2:00 PM     Author: Casey Ann MD Service: -- Author Type: Physician    Filed: 2/15/2021  2:24 PM Encounter Date: 2/15/2021 Status: Signed    : Casey Ann MD (Physician)         Patient Education   Understanding Advance Care Planning  Advance care planning is the process of deciding ones own future medical care. It helps ensure that if you cant speak for yourself, your wishes can still be carried out. The plan is a series of legal documents that note a persons wishes. The documents vary by state. Advance care planning may be done when a person has a serious illness that is expected to get worse. It may be done before major surgery. And it can help you and your family be prepared in case of a major illness or injury. Advance care planning helps with making decisions at these times.       A health care proxy is a person who acts as the voice of a patient when the patient cant speak for himself or herself. The name of this role varies by state. It may be called a Durable Medical Power of  or Durable Power of  for Healthcare. It may be called an agent, surrogate, or advocate. Or it may be called a representative or decision maker. It is an official duty that is identified by a legal document. The document also varies by state.    Why Is Advance Care Planning Important?  If a person communicates their healthcare wishes:    They will be given medical care that matches their values and goals.    Their family members will not be forced to make decisions in a crisis with no guidance.  Creating a Plan  Making an advance care plan is often done in 3 steps:    Thinking about ones wishes. To create an advance care plan, you should think about what kind of medical treatment you would want if you lose the ability to communicate. Are there any situations in which you would refuse or stop treatment? Are there therapies you would  want or not want? And whom do you want to make decisions for you? There are many places to learn more about how to plan for your care. Ask your doctor or  for resources.    Picking a health care proxy. This means choosing a trusted person to speak for you only when you cant speak for yourself. When you cannot make medical decisions, your proxy makes sure the instructions in your advance care plan are followed. A proxy does not make decisions based on his or her own opinions. They must put aside those opinions and values if needed, and carry out your wishes.    Filling out the legal documents. There are several kinds of legal documents for advance care planning. Each one tells health care providers your wishes. The documents may vary by state. They must be signed and may need to be witnessed or notarized. You can cancel or change them whenever you wish. Depending on your state, the documents may include a Healthcare Proxy form, Living Will, Durable Medical Power of , Advance Directive, or others.  The Familys Role  The best help a family can give is to support their loved ones wishes. Open and honest communication is vital. Family should express any concerns they have about the patients choices while the patient can still make decisions.    [id number] [employer]. [address] [place] [address], [place] [address]. All rights reserved. This information is not intended as a substitute for professional medical care. Always follow your healthcare professional's instructions.         Also, [employer] [place] offers a free, downloadable health care directive that allows you to share your treatment choices and personal preferences if you cannot communicate your wishes. It also allows you to appoint another person (called a health care agent) to make health care decisions if you are unable to do so. You can download an advance directive by going here:  [email or web address]     Patient Education   Personalized Prevention Plan  You are due for the preventive services outlined below.  Your care team is available to assist you in scheduling these services.  If you have already completed any of these items, please share that information with your care team to update in your medical record.  Health Maintenance   Topic Date Due   ? COVID-19 Vaccine (1 of 2) 07/22/1957   ? ZOSTER VACCINES (1 of 2) 07/22/1990   ? Pneumococcal Vaccine: 65+ Years (2 of 2 - PPSV23) 11/09/2016   ? INFLUENZA VACCINE RULE BASED (1) 08/01/2020   ? MEDICARE ANNUAL WELLNESS VISIT  02/15/2022   ? FALL RISK ASSESSMENT  02/15/2022   ? TD 18+ HE  11/04/2023   ? LIPID  02/14/2025   ? ADVANCE CARE PLANNING  02/14/2025   ? Pneumococcal Vaccine: Pediatrics (0 to 5 Years) and At-Risk Patients (6 to 64 Years)  Aged Out   ? HEPATITIS B VACCINES  Aged Out              14

## 2022-04-06 NOTE — PATIENT PROFILE ADULT - FALL HARM RISK - HARM RISK INTERVENTIONS

## 2022-04-06 NOTE — ED ADULT TRIAGE NOTE - CHIEF COMPLAINT QUOTE
BIBA- from home c/o CP and abd pain for over a month but worse past 2 days  On oxycodone "but not working"  HX Pancreatitis, ETOH abuse, gastric perforation

## 2022-04-06 NOTE — ED PROVIDER NOTE - OBJECTIVE STATEMENT
61M w/ PMH EtOH abuse, pancreatitis pw diffuse CP and upper abd pain x 1wk, worse x past 2 days. Pt reports pain radiates into back and feels like prior pancreatitis. Pt reports not eating x 1mo, last drink was Saturday night. Pt denies F/C, SOB, cough, N/V/D/C, UTI sx. Pt taking oxycodone w/o relief.     PMH as above, PSH gastric perforation, NKDA, meds as listed. 61M w/ PMH EtOH abuse, pancreatitis pw diffuse CP and upper abd pain x 1wk, worse x past 2 days. Pt reports pain radiates into back and feels like prior pancreatitis. Pt reports not eating x 1mo, last drink was Saturday night. Pt denies F/C, SOB, cough, N/V/D/C, UTI sx. Pt taking oxycodone w/o relief. Pt reports 30lb wt loss (156lbs to 127lbs since 3/1)     PMH as above, PSH gastric perforation, NKDA, meds as listed.

## 2022-04-06 NOTE — H&P ADULT - ASSESSMENT
61 years old male with h/o ETOH abuse, chronic pancreatitis, esophagitis, hep C, CKD 4 present to ED with complain of abdominal pain. Patient reported mid upper abdominal pain for 7 days, which progressively worsen for last 2 days. Pain is constant, 10/10, aching pain. Denied any anusea or vomiting. Reported decrease oral intake and loss of appetite. Reported singnificant weight loss over last month ( 30lb weight loss)   Hemodynamically stable, afebrile, sat well at RA. EKG with NSR, VR 92, QTc 467, Slight ST changes in II but very poor waveform. No leukocytosis, Plt 310, K 4.4, Cr 5.45 ( 2.91 in 03/05/2022), lipase 119, lactate 1.4, hsTnT 8.3, HIV negative. CT abd/pelvis with findings of chronic pancreatitis. Etiology of abd pain is otherwise not elucidated. CXR image reviewed, no focal consolidation.     Admitted with YUNIEL on CKD, malnutrition

## 2022-04-06 NOTE — H&P ADULT - PROBLEM SELECTOR PLAN 2
Significant weight loss of 30 lb within one month period associated with loss of appetite  Had EGD done on 01/28/2022 ( refer to result) recommendation was to repeat EGD within a month for further biopsies and possible EUS of the GE junction   GI consulted- Dr Edwards, likely need repeat EGD  Clear liquid diet  Continue pantoprazole 40mg bid

## 2022-04-06 NOTE — H&P ADULT - NSHPREVIEWOFSYSTEMS_GEN_ALL_CORE
All ROS were negative except generalized weakness, decrease oral intake, loss of appetite, loss of weight, abdominal pain

## 2022-04-06 NOTE — ED ADULT NURSE NOTE - OBJECTIVE STATEMENT
A&OX4. Patient states abdominal pain with Nausea  for months worst the past two days. patient denies drinking everyday last drink yesterday at dinner, denies fevers, SOB,. Patient states decrease in appetite  and weight loss past month PMH ETOH, Gastric perforation, Pancreatitis

## 2022-04-06 NOTE — ED ADULT NURSE NOTE - NSIMPLEMENTINTERV_GEN_ALL_ED
Implemented All Universal Safety Interventions:  [place] to call system. Call bell, personal items and telephone within reach. Instruct patient to call for assistance. Room bathroom lighting operational. Non-slip footwear when patient is off stretcher. Physically safe environment: no spills, clutter or unnecessary equipment. Stretcher in lowest position, wheels locked, appropriate side rails in place.

## 2022-04-06 NOTE — ED PROVIDER NOTE - PHYSICAL EXAMINATION
GEN: Awake, alert, interactive, NAD.  HEAD AND NECK: NC/AT. Airway patent. Neck supple.   EYES:  Clear b/l. EOMI. PERRL.   ENT: Moist mucus membranes.   CARDIAC: Regular rate, regular rhythm. No evident pedal edema.    RESP/CHEST: Normal respiratory effort with no use of accessory muscles or retractions. Clear throughout on auscultation.  ABD: Soft, non-distended, non-tender. No rebound, no guarding.   BACK: No midline spinal TTP. No CVAT.   EXTREMITIES: Moving all extremities with no apparent deformities.   SKIN: Warm, dry, intact normal color. No rash.   NEURO: AOx3, CN II-XII grossly intact, no focal deficits.   PSYCH: Appropriate mood and affect. GEN: Awake, alert, interactive, NAD. Cachetic.   HEAD AND NECK: NC/AT. Airway patent. Neck supple.   EYES:  Clear b/l. EOMI. PERRL.   ENT: Moist mucus membranes.   CARDIAC: Regular rate, regular rhythm. No evident pedal edema.    RESP/CHEST: Normal respiratory effort with no use of accessory muscles or retractions. Clear throughout on auscultation.  ABD: Soft, non-distended, non-tender. No rebound, no guarding.   BACK: No midline spinal TTP. No CVAT.   EXTREMITIES: Moving all extremities with no apparent deformities.   SKIN: Warm, dry, intact normal color. No rash.   NEURO: AOx3, CN II-XII grossly intact, no focal deficits.   PSYCH: Appropriate mood and affect.

## 2022-04-06 NOTE — H&P ADULT - NSHPPHYSICALEXAM_GEN_ALL_CORE
CONSTITUTIONAL: thin and malnourished, alert and cooperative, no acute distress.  EYES: PERRL, no scleral icterus  ENT: Mucosa moist, tongue normal.   NECK: Neck supple, trachea midline, non-tender  CARDIAC: Normal S1 and S2. Regular rate and rhythms. No murmurs.  No Pedal edema. Peripheral pulses intact  LUNGS: Clear to auscultation, equal air entry both lungs. No rales, rhonchi, wheezing. Normal respiratory effort.   ABDOMEN: Soft, nondistended, nontender. No guarding or rebound tenderness. No hepatomegaly or splenomegaly.   MUSCULOSKELETAL: Normocephalic, atraumatic. No significant deformity or joint abnormality.   NEUROLOGICAL: No gross motor or sensory deficits.   SKIN: no lesions or eruptions. Normal turgor  PSYCHIATRIC: A&O x 3, appropriate mood and affect.

## 2022-04-06 NOTE — H&P ADULT - NSHPLABSRESULTS_GEN_ALL_CORE
EGD on 01/28/2022-   Impression:  LA Grade D reflux esophagitis. Rule out Howard's esophagus.  Discolored, erythematous, nodular mucosa in the esophagus. Biopsied.  Extrinsic compression in the gastric fundus.  Gastritis. Biopsied.  Normal examined duodenum    Biopsy  1. Stomach, antrum and body, biopsy:  Gastric antral mucosa with mild chronic focal active gastritis  Gastric oxyntic mucosa with no significant histopathologic findings  No morphologic evidence of Helicobacter microorganisms  Negative for intestinal metaplasia    2. GE Junction, biopsy:  Squamocolumnar mucosa with ulcer, granulation tissue, active  esophagitis and active carditis  Negative for fungal organisms (GMS stain)  Negative for intestinal metaplasia

## 2022-04-06 NOTE — H&P ADULT - PROBLEM SELECTOR PLAN 1
Cr 5.45 ( 2.91 in 03/05/2022)  Likely due to decrease oral intake  Clinically appear dry and dehydrated  IV fluid  Monitor renal function  Avoid nephrotoxic substances  Nephrology consult- Dr Dukes

## 2022-04-06 NOTE — H&P ADULT - HISTORY OF PRESENT ILLNESS
61 years old male with h/o ETOH abuse, chronic pancreatitis, esophagitis, hep C, CKD 4 present to ED with complain of abdominal pain. Patient reported mid upper abdominal pain for 7 days, which progressively worsen for last 2 days. Pain is constant, 10/10, aching pain. Denied any anusea or vomiting. Reported decrease oral intake and loss of appetite. Reported singnificant weight loss over last month ( 30lb weight loss)   Hemodynamically stable, afebrile, sat well at RA. EKG with NSR, VR 92, QTc 467, Slight ST changes in II but very poor waveform. No leukocytosis, Plt 310, K 4.4, Cr 5.45 ( 2.91 in 03/05/2022), lipase 119, lactate 1.4, hsTnT 8.3, HIV negative. CT abd/pelvis with findings of chronic pancreatitis. Etiology of abd pain is otherwise not elucidated. CXR image reviewed, no focal consolidation.     SH: former ETHO use  FH: no family h/o HTN, DM

## 2022-04-06 NOTE — ED PROVIDER NOTE - CLINICAL SUMMARY MEDICAL DECISION MAKING FREE TEXT BOX
61M w/ PMH EtOH abuse, pancreatitis, hx gastric perforation pw diffuse CP, upper abd pain rad into back x 1wk, worse x 2 days. AFVSS. Pt frail appearing, in NAD. Plan: CBC, CMP, lipase, Flu/COVID, CT AP, CXR, ECG, trop, T&S, coag panel. Give IVF, Pepcid, Morphine. Anticipate admission. 61M w/ PMH EtOH abuse, pancreatitis, hx gastric perforation pw diffuse CP, upper abd pain rad into back x 1wk, worse x 2 days. AFVSS. Pt cachetic, appears uncomfortable. Plan: CBC, CMP, lipase, Flu/COVID, CT AP, CXR, ECG, trop, T&S, coag panel, HIV, acute hepatitis panel. Give IVF, Pepcid, Morphine. Re-eval. Anticipate admission.

## 2022-04-06 NOTE — H&P ADULT - PROBLEM SELECTOR PLAN 4
Chronic pancreatitis, lipase 119  CT abd/pelvis with findings of chronic pancreatitis. Etiology of abd pain is otherwise not elucidated.  Pain control, IV fluid, clear liquid diet  Continue pancrelipase

## 2022-04-07 LAB
ALBUMIN SERPL ELPH-MCNC: 2.8 G/DL — LOW (ref 3.3–5)
ALP SERPL-CCNC: 87 U/L — SIGNIFICANT CHANGE UP (ref 40–120)
ALT FLD-CCNC: 9 U/L — LOW (ref 12–78)
ANION GAP SERPL CALC-SCNC: 5 MMOL/L — SIGNIFICANT CHANGE UP (ref 5–17)
AST SERPL-CCNC: 11 U/L — LOW (ref 15–37)
BILIRUB SERPL-MCNC: 0.2 MG/DL — SIGNIFICANT CHANGE UP (ref 0.2–1.2)
BUN SERPL-MCNC: 57 MG/DL — HIGH (ref 7–23)
CALCIUM SERPL-MCNC: 8.1 MG/DL — LOW (ref 8.5–10.1)
CHLORIDE SERPL-SCNC: 111 MMOL/L — HIGH (ref 96–108)
CO2 SERPL-SCNC: 26 MMOL/L — SIGNIFICANT CHANGE UP (ref 22–31)
CREAT SERPL-MCNC: 4.78 MG/DL — HIGH (ref 0.5–1.3)
EGFR: 13 ML/MIN/1.73M2 — LOW
GLUCOSE SERPL-MCNC: 108 MG/DL — HIGH (ref 70–99)
HCT VFR BLD CALC: 36.8 % — LOW (ref 39–50)
HGB BLD-MCNC: 11.4 G/DL — LOW (ref 13–17)
MAGNESIUM SERPL-MCNC: 4.2 MG/DL — HIGH (ref 1.6–2.6)
MCHC RBC-ENTMCNC: 28.4 PG — SIGNIFICANT CHANGE UP (ref 27–34)
MCHC RBC-ENTMCNC: 31 G/DL — LOW (ref 32–36)
MCV RBC AUTO: 91.8 FL — SIGNIFICANT CHANGE UP (ref 80–100)
NRBC # BLD: 0 /100 WBCS — SIGNIFICANT CHANGE UP (ref 0–0)
PHOSPHATE SERPL-MCNC: 4 MG/DL — SIGNIFICANT CHANGE UP (ref 2.5–4.5)
PLATELET # BLD AUTO: 218 K/UL — SIGNIFICANT CHANGE UP (ref 150–400)
POTASSIUM SERPL-MCNC: 3.1 MMOL/L — LOW (ref 3.5–5.3)
POTASSIUM SERPL-SCNC: 3.1 MMOL/L — LOW (ref 3.5–5.3)
PROT SERPL-MCNC: 5.9 GM/DL — LOW (ref 6–8.3)
RBC # BLD: 4.01 M/UL — LOW (ref 4.2–5.8)
RBC # FLD: 18.4 % — HIGH (ref 10.3–14.5)
SODIUM SERPL-SCNC: 142 MMOL/L — SIGNIFICANT CHANGE UP (ref 135–145)
WBC # BLD: 8.98 K/UL — SIGNIFICANT CHANGE UP (ref 3.8–10.5)
WBC # FLD AUTO: 8.98 K/UL — SIGNIFICANT CHANGE UP (ref 3.8–10.5)

## 2022-04-07 PROCEDURE — 99232 SBSQ HOSP IP/OBS MODERATE 35: CPT

## 2022-04-07 RX ORDER — HYDROMORPHONE HYDROCHLORIDE 2 MG/ML
4 INJECTION INTRAMUSCULAR; INTRAVENOUS; SUBCUTANEOUS
Refills: 0 | Status: DISCONTINUED | OUTPATIENT
Start: 2022-04-07 | End: 2022-04-13

## 2022-04-07 RX ORDER — POTASSIUM CHLORIDE 20 MEQ
20 PACKET (EA) ORAL
Refills: 0 | Status: COMPLETED | OUTPATIENT
Start: 2022-04-07 | End: 2022-04-07

## 2022-04-07 RX ADMIN — Medication 20 MILLIEQUIVALENT(S): at 17:04

## 2022-04-07 RX ADMIN — SENNA PLUS 2 TABLET(S): 8.6 TABLET ORAL at 21:39

## 2022-04-07 RX ADMIN — Medication 10 MILLIGRAM(S): at 17:05

## 2022-04-07 RX ADMIN — Medication 1 GRAM(S): at 17:05

## 2022-04-07 RX ADMIN — Medication 20 MILLIEQUIVALENT(S): at 18:46

## 2022-04-07 RX ADMIN — PANTOPRAZOLE SODIUM 40 MILLIGRAM(S): 20 TABLET, DELAYED RELEASE ORAL at 05:18

## 2022-04-07 RX ADMIN — TAMSULOSIN HYDROCHLORIDE 0.4 MILLIGRAM(S): 0.4 CAPSULE ORAL at 21:38

## 2022-04-07 RX ADMIN — HYDROMORPHONE HYDROCHLORIDE 4 MILLIGRAM(S): 2 INJECTION INTRAMUSCULAR; INTRAVENOUS; SUBCUTANEOUS at 13:01

## 2022-04-07 RX ADMIN — Medication 10 MILLIGRAM(S): at 05:18

## 2022-04-07 RX ADMIN — Medication 1 TABLET(S): at 12:11

## 2022-04-07 RX ADMIN — Medication 100 MILLIGRAM(S): at 11:58

## 2022-04-07 RX ADMIN — Medication 2 CAPSULE(S): at 17:05

## 2022-04-07 RX ADMIN — PANTOPRAZOLE SODIUM 40 MILLIGRAM(S): 20 TABLET, DELAYED RELEASE ORAL at 17:06

## 2022-04-07 RX ADMIN — Medication 1 MILLIGRAM(S): at 11:59

## 2022-04-07 RX ADMIN — SODIUM CHLORIDE 125 MILLILITER(S): 9 INJECTION, SOLUTION INTRAVENOUS at 05:18

## 2022-04-07 RX ADMIN — Medication 1 GRAM(S): at 11:59

## 2022-04-07 RX ADMIN — OXYCODONE HYDROCHLORIDE 5 MILLIGRAM(S): 5 TABLET ORAL at 06:37

## 2022-04-07 RX ADMIN — Medication 1 GRAM(S): at 05:18

## 2022-04-07 RX ADMIN — Medication 20 MILLIEQUIVALENT(S): at 14:11

## 2022-04-07 RX ADMIN — HYDROMORPHONE HYDROCHLORIDE 4 MILLIGRAM(S): 2 INJECTION INTRAMUSCULAR; INTRAVENOUS; SUBCUTANEOUS at 12:01

## 2022-04-07 RX ADMIN — Medication 2 CAPSULE(S): at 09:18

## 2022-04-07 RX ADMIN — Medication 2 CAPSULE(S): at 11:59

## 2022-04-07 RX ADMIN — GABAPENTIN 200 MILLIGRAM(S): 400 CAPSULE ORAL at 21:38

## 2022-04-07 RX ADMIN — Medication 10 MILLIGRAM(S): at 11:59

## 2022-04-07 RX ADMIN — SODIUM CHLORIDE 125 MILLILITER(S): 9 INJECTION, SOLUTION INTRAVENOUS at 09:03

## 2022-04-07 RX ADMIN — SODIUM CHLORIDE 125 MILLILITER(S): 9 INJECTION, SOLUTION INTRAVENOUS at 21:41

## 2022-04-07 RX ADMIN — HYDROMORPHONE HYDROCHLORIDE 4 MILLIGRAM(S): 2 INJECTION INTRAMUSCULAR; INTRAVENOUS; SUBCUTANEOUS at 18:45

## 2022-04-07 RX ADMIN — HYDROMORPHONE HYDROCHLORIDE 4 MILLIGRAM(S): 2 INJECTION INTRAMUSCULAR; INTRAVENOUS; SUBCUTANEOUS at 19:00

## 2022-04-07 RX ADMIN — Medication 3 MILLIGRAM(S): at 21:38

## 2022-04-07 RX ADMIN — OXYCODONE HYDROCHLORIDE 5 MILLIGRAM(S): 5 TABLET ORAL at 05:27

## 2022-04-07 NOTE — CONSULT NOTE ADULT - SUBJECTIVE AND OBJECTIVE BOX
NEPHROLOGY CONSULTATION    CHIEF COMPLAINT:  YUNIEL/CKD    HPI:  Presents with chest and abdominal pain.  Found to be in YUNIEL on CKD.  Gagan Cr is 3 and it is above this baseline but better today after IVF.  Poor appetite and hydration lately with large weight loss.      ROS:  denies n/v, diarrhea    PAST MEDICAL & SURGICAL HISTORY:  Pancreatitis  Hepatitis C  Gout  Gastric perforation      SOCIAL HISTORY:  EtOH abuse    FAMILY HISTORY:  no CKD      MEDICATIONS  (STANDING):  folic acid 1 milliGRAM(s) Oral daily  gabapentin 200 milliGRAM(s) Oral at bedtime  lactated ringers. 1000 milliLiter(s) (125 mL/Hr) IV Continuous <Continuous>  metoclopramide 10 milliGRAM(s) Oral four times a day  multivitamin 1 Tablet(s) Oral daily  pancrelipase  (CREON  6,000 Lipase Units) 2 Capsule(s) Oral three times a day with meals  pantoprazole    Tablet 40 milliGRAM(s) Oral two times a day  potassium chloride    Tablet ER 20 milliEquivalent(s) Oral every 2 hours  senna 2 Tablet(s) Oral at bedtime  sucralfate 1 Gram(s) Oral four times a day  tamsulosin 0.4 milliGRAM(s) Oral at bedtime  thiamine 100 milliGRAM(s) Oral daily      PHYSICAL EXAMINATION:  T(F): 98.2 (04-07-22 @ 11:24)  HR: 80 (04-07-22 @ 11:24)  BP: 115/72 (04-07-22 @ 11:24)  RR: 20 (04-07-22 @ 11:24)  SpO2: 100% (04-07-22 @ 11:24)  Conversant, no apparent distress  PERRLA, pink conjunctivae, no ptosis  Good dentition, no pharyngeal erythema  Neck non tender, no mass, no thyromegaly or nodules  Normal respiratory effort, lungs clear to auscultation  Heart with RRR, no murmurs or rubs, no peripheral edema  Abdomen soft, no masses, no organomegaly  Skin no rashes, ulcers or lesions, normal turgor and temperature  Appropriate affect, AO x 3    LABS:                        11.4   8.98  )-----------( 218      ( 07 Apr 2022 11:04 )             36.8     04-07    142  |  111<H>  |  57<H>  ----------------------------<  108<H>  3.1<L>   |  26  |  4.78<H>    Ca    8.1<L>      07 Apr 2022 11:04  Phos  4.0     04-07  Mg     4.2     04-07    TPro  5.9<L>  /  Alb  2.8<L>  /  TBili  0.2  /  DBili  x   /  AST  11<L>  /  ALT  9<L>  /  AlkPhos  87  04-07      RADIOLOGY:  Chest X-Ray personally reviewed and shows NAPD    CT scan shows chronic pancreatitis      ASSESSMENT:  1.  YUNIEL - rule out prerenal azotemia versus outpatient ATN  2.  Abdominal pain NOS / chronic pancreatitis  3.  Unintentional weight loss    PLAN:  Continue IVF today  Supplement KCl  BMP in AM        
Full consult dictated    Plan:  Pt with multiple medical problems - h/o pancreatitis; alcohol abuse; renal insufficiency. Pt now re-admitted with epigastric pain; N/V, and a 30 pound weight loss. EGD results noted from 1/28/22. A repeat EGD was recommended but not performed.  Will order carafate and protonix; repeat CBC in AM. Will consider repeat EGD on Friday if family agrees.

## 2022-04-07 NOTE — DIETITIAN INITIAL EVALUATION ADULT - NS FNS REASON FOR WEIGHT CHANG
Pt reports UBW was 150 pounds x 1 month ago and he has lost 30 pounds. ?accuracy of reported previous weight. Per previous RD note and Tish PHELAN pt weighed: (03/09/22) 60.4kg (02/16/22) 60.8kg (01/25/22) 63.3kg./decreased po intake

## 2022-04-07 NOTE — DIETITIAN INITIAL EVALUATION ADULT - ORAL INTAKE PTA/DIET HISTORY
Pt reports poor appetite and PO intake x 1 month PTA due to feeling unwell. States no diet restrictions PTA.

## 2022-04-07 NOTE — DIETITIAN INITIAL EVALUATION ADULT - PERTINENT MEDS FT
MEDICATIONS  (STANDING):  folic acid 1 milliGRAM(s) Oral daily  gabapentin 200 milliGRAM(s) Oral at bedtime  lactated ringers. 1000 milliLiter(s) (125 mL/Hr) IV Continuous <Continuous>  metoclopramide 10 milliGRAM(s) Oral four times a day  multivitamin 1 Tablet(s) Oral daily  pancrelipase  (CREON  6,000 Lipase Units) 2 Capsule(s) Oral three times a day with meals  pantoprazole    Tablet 40 milliGRAM(s) Oral two times a day  senna 2 Tablet(s) Oral at bedtime  sucralfate 1 Gram(s) Oral four times a day  tamsulosin 0.4 milliGRAM(s) Oral at bedtime  thiamine 100 milliGRAM(s) Oral daily    MEDICATIONS  (PRN):  acetaminophen     Tablet .. 650 milliGRAM(s) Oral every 6 hours PRN Mild Pain (1 - 3), Moderate Pain (4 - 6)  HYDROmorphone   Tablet 4 milliGRAM(s) Oral four times a day PRN Severe Pain (7 - 10)  melatonin 3 milliGRAM(s) Oral at bedtime PRN Insomnia

## 2022-04-07 NOTE — DIETITIAN INITIAL EVALUATION ADULT - OTHER INFO
Preferences taken and relayed to diet office. Requests Ensure Enlive x 3/day (provides 1050 kcal, 60 g protein).

## 2022-04-07 NOTE — PROGRESS NOTE ADULT - SUBJECTIVE AND OBJECTIVE BOX
Pt stable  agrees to gastroscopy in AM      MEDICATIONS  (STANDING):  folic acid 1 milliGRAM(s) Oral daily  gabapentin 200 milliGRAM(s) Oral at bedtime  lactated ringers. 1000 milliLiter(s) (125 mL/Hr) IV Continuous <Continuous>  metoclopramide 10 milliGRAM(s) Oral four times a day  multivitamin 1 Tablet(s) Oral daily  pancrelipase  (CREON  6,000 Lipase Units) 2 Capsule(s) Oral three times a day with meals  pantoprazole    Tablet 40 milliGRAM(s) Oral two times a day  potassium chloride    Tablet ER 20 milliEquivalent(s) Oral every 2 hours  senna 2 Tablet(s) Oral at bedtime  sucralfate 1 Gram(s) Oral four times a day  tamsulosin 0.4 milliGRAM(s) Oral at bedtime  thiamine 100 milliGRAM(s) Oral daily    MEDICATIONS  (PRN):  acetaminophen     Tablet .. 650 milliGRAM(s) Oral every 6 hours PRN Mild Pain (1 - 3), Moderate Pain (4 - 6)  HYDROmorphone   Tablet 4 milliGRAM(s) Oral four times a day PRN Severe Pain (7 - 10)  melatonin 3 milliGRAM(s) Oral at bedtime PRN Insomnia      Allergies    No Known Allergies    Intolerances        Vital Signs Last 24 Hrs  T(C): 36.8 (07 Apr 2022 11:24), Max: 36.8 (07 Apr 2022 11:24)  T(F): 98.2 (07 Apr 2022 11:24), Max: 98.2 (07 Apr 2022 11:24)  HR: 80 (07 Apr 2022 11:24) (65 - 80)  BP: 115/72 (07 Apr 2022 11:24) (100/60 - 115/72)  BP(mean): --  RR: 20 (07 Apr 2022 11:24) (17 - 20)  SpO2: 100% (07 Apr 2022 11:24) (95% - 100%)    PHYSICAL EXAM:  General: NAD.  CVS: S1, S2  Chest: air entry bilaterally present  Abd: BS present, soft, non-tender      LABS:                        11.4   8.98  )-----------( 218      ( 07 Apr 2022 11:04 )             36.8     04-07    142  |  111<H>  |  57<H>  ----------------------------<  108<H>  3.1<L>   |  26  |  4.78<H>    Ca    8.1<L>      07 Apr 2022 11:04  Phos  4.0     04-07  Mg     4.2     04-07    TPro  5.9<L>  /  Alb  2.8<L>  /  TBili  0.2  /  DBili  x   /  AST  11<L>  /  ALT  9<L>  /  AlkPhos  87  04-07    PT/INR - ( 06 Apr 2022 11:01 )   PT: 11.4 sec;   INR: 0.96 ratio         PTT - ( 06 Apr 2022 11:01 )  PTT:32.7 sec    continue protonix  NPO after MN for EGD - consent signed

## 2022-04-07 NOTE — DIETITIAN INITIAL EVALUATION ADULT - OTHER CALCULATIONS
Ht (cm): 185.4cm   Wt (kg): 57.6kg (admission weight 04/06)  BMI: 16.8    IBW: 83.4kg +/- 10%  %IBW: 69% UBW: 63.3kg %UBW: 92%

## 2022-04-07 NOTE — DIETITIAN INITIAL EVALUATION ADULT - PERTINENT LABORATORY DATA
04-07    142  |  111<H>  |  57<H>  ----------------------------<  108<H>  3.1<L>   |  26  |  4.78<H>    Ca    8.1<L>      07 Apr 2022 11:04  Phos  4.0     04-07  Mg     4.2     04-07    TPro  5.9<L>  /  Alb  2.8<L>  /  TBili  0.2  /  DBili  x   /  AST  11<L>  /  ALT  9<L>  /  AlkPhos  87  04-07  A1C with Estimated Average Glucose Result: 5.5 % (02-09-22 @ 17:47)

## 2022-04-07 NOTE — PROGRESS NOTE ADULT - SUBJECTIVE AND OBJECTIVE BOX
Patient is a 61y old  Male who presents with a chief complaint of YUNIEL on CKD, malnutrition (06 Apr 2022 21:24)       INTERVAL HPI/OVERNIGHT EVENTS: feeling better today.       REVIEW OF SYSTEMS:   Remaining ROS negative    Home Medications:  aluminum hydroxide-magnesium hydroxide 200 mg-200 mg/5 mL oral suspension: 30 milliliter(s) orally every 4 hours, As needed, Dyspepsia (06 Apr 2022 12:31)  Multiple Vitamins oral tablet: 1 tab(s) orally once a day (06 Apr 2022 12:31)        MEDICATIONS  (STANDING):  folic acid 1 milliGRAM(s) Oral daily  gabapentin 200 milliGRAM(s) Oral at bedtime  lactated ringers. 1000 milliLiter(s) (125 mL/Hr) IV Continuous <Continuous>  metoclopramide 10 milliGRAM(s) Oral four times a day  multivitamin 1 Tablet(s) Oral daily  pancrelipase  (CREON  6,000 Lipase Units) 2 Capsule(s) Oral three times a day with meals  pantoprazole    Tablet 40 milliGRAM(s) Oral two times a day  potassium chloride    Tablet ER 20 milliEquivalent(s) Oral every 2 hours  senna 2 Tablet(s) Oral at bedtime  sucralfate 1 Gram(s) Oral four times a day  tamsulosin 0.4 milliGRAM(s) Oral at bedtime  thiamine 100 milliGRAM(s) Oral daily    MEDICATIONS  (PRN):  acetaminophen     Tablet .. 650 milliGRAM(s) Oral every 6 hours PRN Mild Pain (1 - 3), Moderate Pain (4 - 6)  HYDROmorphone   Tablet 4 milliGRAM(s) Oral four times a day PRN Severe Pain (7 - 10)  melatonin 3 milliGRAM(s) Oral at bedtime PRN Insomnia      Allergies    No Known Allergies    Intolerances        Vital Signs Last 24 Hrs  T(C): 36.8 (07 Apr 2022 11:24), Max: 36.8 (07 Apr 2022 11:24)  T(F): 98.2 (07 Apr 2022 11:24), Max: 98.2 (07 Apr 2022 11:24)  HR: 80 (07 Apr 2022 11:24) (65 - 80)  BP: 115/72 (07 Apr 2022 11:24) (100/60 - 134/78)  BP(mean): --  RR: 20 (07 Apr 2022 11:24) (17 - 20)  SpO2: 100% (07 Apr 2022 11:24) (95% - 100%)    PHYSICAL EXAM:  GENERAL: NAD  HEAD:  Atraumatic, Normocephalic  EYES: EOMI, PERRLA, conjunctiva and sclera clear  ENT: O/P Clear  NECK: Supple, No JVD  NERVOUS SYSTEM:  No focal deficits  CHEST/LUNG: Clear to percussion bilaterally; No rales, rhonchi, wheezing  HEART: Regular rate and rhythm; No murmurs, rubs, or gallops  ABDOMEN: Soft, Nontender, Nondistended; Bowel sounds present  EXTREMITIES:  2+ Peripheral Pulses, No clubbing, cyanosis, or edema  SKIN: No rashes or lesions    LABS:                        11.4   8.98  )-----------( 218      ( 07 Apr 2022 11:04 )             36.8     04-07    142  |  111<H>  |  57<H>  ----------------------------<  108<H>  3.1<L>   |  26  |  4.78<H>    Ca    8.1<L>      07 Apr 2022 11:04  Phos  4.0     04-07  Mg     4.2     04-07    TPro  5.9<L>  /  Alb  2.8<L>  /  TBili  0.2  /  DBili  x   /  AST  11<L>  /  ALT  9<L>  /  AlkPhos  87  04-07    PT/INR - ( 06 Apr 2022 11:01 )   PT: 11.4 sec;   INR: 0.96 ratio         PTT - ( 06 Apr 2022 11:01 )  PTT:32.7 sec    CAPILLARY BLOOD GLUCOSE          RADIOLOGY & ADDITIONAL TESTS:    Imaging Personally Reviewed:  [ ] YES  [ ] NO    Consultant(s) Notes Reviewed:  [ ] YES  [ ] NO    Care Discussed with Consultants/Other Providers [ ] YES  [ ] NO

## 2022-04-07 NOTE — DIETITIAN INITIAL EVALUATION ADULT - ENERGY INTAKE
Regular PO diet initiated at time of visit. Pt was ordering lunch at time of visit. States he feels hungry.

## 2022-04-08 ENCOUNTER — RESULT REVIEW (OUTPATIENT)
Age: 62
End: 2022-04-08

## 2022-04-08 ENCOUNTER — TRANSCRIPTION ENCOUNTER (OUTPATIENT)
Age: 62
End: 2022-04-08

## 2022-04-08 LAB
ANION GAP SERPL CALC-SCNC: 6 MMOL/L — SIGNIFICANT CHANGE UP (ref 5–17)
BUN SERPL-MCNC: 50 MG/DL — HIGH (ref 7–23)
CALCIUM SERPL-MCNC: 8.4 MG/DL — LOW (ref 8.5–10.1)
CHLORIDE SERPL-SCNC: 116 MMOL/L — HIGH (ref 96–108)
CO2 SERPL-SCNC: 23 MMOL/L — SIGNIFICANT CHANGE UP (ref 22–31)
CREAT SERPL-MCNC: 3.88 MG/DL — HIGH (ref 0.5–1.3)
EGFR: 17 ML/MIN/1.73M2 — LOW
GLUCOSE SERPL-MCNC: 94 MG/DL — SIGNIFICANT CHANGE UP (ref 70–99)
HCT VFR BLD CALC: 34.6 % — LOW (ref 39–50)
HGB BLD-MCNC: 10.9 G/DL — LOW (ref 13–17)
MCHC RBC-ENTMCNC: 29.1 PG — SIGNIFICANT CHANGE UP (ref 27–34)
MCHC RBC-ENTMCNC: 31.5 G/DL — LOW (ref 32–36)
MCV RBC AUTO: 92.3 FL — SIGNIFICANT CHANGE UP (ref 80–100)
NRBC # BLD: 0 /100 WBCS — SIGNIFICANT CHANGE UP (ref 0–0)
PLATELET # BLD AUTO: 211 K/UL — SIGNIFICANT CHANGE UP (ref 150–400)
POTASSIUM SERPL-MCNC: 3.6 MMOL/L — SIGNIFICANT CHANGE UP (ref 3.5–5.3)
POTASSIUM SERPL-SCNC: 3.6 MMOL/L — SIGNIFICANT CHANGE UP (ref 3.5–5.3)
RBC # BLD: 3.75 M/UL — LOW (ref 4.2–5.8)
RBC # FLD: 18.4 % — HIGH (ref 10.3–14.5)
SODIUM SERPL-SCNC: 145 MMOL/L — SIGNIFICANT CHANGE UP (ref 135–145)
WBC # BLD: 6.06 K/UL — SIGNIFICANT CHANGE UP (ref 3.8–10.5)
WBC # FLD AUTO: 6.06 K/UL — SIGNIFICANT CHANGE UP (ref 3.8–10.5)

## 2022-04-08 PROCEDURE — 88305 TISSUE EXAM BY PATHOLOGIST: CPT | Mod: 26

## 2022-04-08 PROCEDURE — 99239 HOSP IP/OBS DSCHRG MGMT >30: CPT

## 2022-04-08 RX ORDER — ONDANSETRON 8 MG/1
4 TABLET, FILM COATED ORAL ONCE
Refills: 0 | Status: DISCONTINUED | OUTPATIENT
Start: 2022-04-08 | End: 2022-04-13

## 2022-04-08 RX ORDER — PANTOPRAZOLE SODIUM 20 MG/1
40 TABLET, DELAYED RELEASE ORAL
Refills: 0 | Status: DISCONTINUED | OUTPATIENT
Start: 2022-04-08 | End: 2022-04-13

## 2022-04-08 RX ORDER — SODIUM CHLORIDE 9 MG/ML
1000 INJECTION, SOLUTION INTRAVENOUS
Refills: 0 | Status: DISCONTINUED | OUTPATIENT
Start: 2022-04-08 | End: 2022-04-10

## 2022-04-08 RX ORDER — CHLORHEXIDINE GLUCONATE 213 G/1000ML
1 SOLUTION TOPICAL ONCE
Refills: 0 | Status: COMPLETED | OUTPATIENT
Start: 2022-04-08 | End: 2022-04-08

## 2022-04-08 RX ORDER — LANOLIN ALCOHOL/MO/W.PET/CERES
1 CREAM (GRAM) TOPICAL
Qty: 0 | Refills: 0 | DISCHARGE
Start: 2022-04-08

## 2022-04-08 RX ORDER — SUCRALFATE 1 G
1 TABLET ORAL
Refills: 0 | Status: DISCONTINUED | OUTPATIENT
Start: 2022-04-08 | End: 2022-04-13

## 2022-04-08 RX ADMIN — Medication 1 GRAM(S): at 05:50

## 2022-04-08 RX ADMIN — HYDROMORPHONE HYDROCHLORIDE 4 MILLIGRAM(S): 2 INJECTION INTRAMUSCULAR; INTRAVENOUS; SUBCUTANEOUS at 06:23

## 2022-04-08 RX ADMIN — Medication 10 MILLIGRAM(S): at 17:20

## 2022-04-08 RX ADMIN — CHLORHEXIDINE GLUCONATE 1 APPLICATION(S): 213 SOLUTION TOPICAL at 06:12

## 2022-04-08 RX ADMIN — Medication 2 CAPSULE(S): at 17:20

## 2022-04-08 RX ADMIN — HYDROMORPHONE HYDROCHLORIDE 4 MILLIGRAM(S): 2 INJECTION INTRAMUSCULAR; INTRAVENOUS; SUBCUTANEOUS at 02:02

## 2022-04-08 RX ADMIN — Medication 1 GRAM(S): at 17:21

## 2022-04-08 RX ADMIN — Medication 1 TABLET(S): at 11:24

## 2022-04-08 RX ADMIN — Medication 10 MILLIGRAM(S): at 05:50

## 2022-04-08 RX ADMIN — HYDROMORPHONE HYDROCHLORIDE 4 MILLIGRAM(S): 2 INJECTION INTRAMUSCULAR; INTRAVENOUS; SUBCUTANEOUS at 15:40

## 2022-04-08 RX ADMIN — PANTOPRAZOLE SODIUM 40 MILLIGRAM(S): 20 TABLET, DELAYED RELEASE ORAL at 05:51

## 2022-04-08 RX ADMIN — HYDROMORPHONE HYDROCHLORIDE 4 MILLIGRAM(S): 2 INJECTION INTRAMUSCULAR; INTRAVENOUS; SUBCUTANEOUS at 01:10

## 2022-04-08 RX ADMIN — SODIUM CHLORIDE 100 MILLILITER(S): 9 INJECTION, SOLUTION INTRAVENOUS at 08:22

## 2022-04-08 RX ADMIN — SENNA PLUS 2 TABLET(S): 8.6 TABLET ORAL at 22:01

## 2022-04-08 RX ADMIN — Medication 1 GRAM(S): at 11:24

## 2022-04-08 RX ADMIN — Medication 10 MILLIGRAM(S): at 00:08

## 2022-04-08 RX ADMIN — PANTOPRAZOLE SODIUM 40 MILLIGRAM(S): 20 TABLET, DELAYED RELEASE ORAL at 17:20

## 2022-04-08 RX ADMIN — HYDROMORPHONE HYDROCHLORIDE 4 MILLIGRAM(S): 2 INJECTION INTRAMUSCULAR; INTRAVENOUS; SUBCUTANEOUS at 23:01

## 2022-04-08 RX ADMIN — GABAPENTIN 200 MILLIGRAM(S): 400 CAPSULE ORAL at 22:01

## 2022-04-08 RX ADMIN — HYDROMORPHONE HYDROCHLORIDE 4 MILLIGRAM(S): 2 INJECTION INTRAMUSCULAR; INTRAVENOUS; SUBCUTANEOUS at 07:23

## 2022-04-08 RX ADMIN — Medication 1 GRAM(S): at 00:08

## 2022-04-08 RX ADMIN — HYDROMORPHONE HYDROCHLORIDE 4 MILLIGRAM(S): 2 INJECTION INTRAMUSCULAR; INTRAVENOUS; SUBCUTANEOUS at 22:01

## 2022-04-08 RX ADMIN — Medication 2 CAPSULE(S): at 13:25

## 2022-04-08 RX ADMIN — TAMSULOSIN HYDROCHLORIDE 0.4 MILLIGRAM(S): 0.4 CAPSULE ORAL at 22:01

## 2022-04-08 RX ADMIN — Medication 10 MILLIGRAM(S): at 11:24

## 2022-04-08 RX ADMIN — SODIUM CHLORIDE 125 MILLILITER(S): 9 INJECTION, SOLUTION INTRAVENOUS at 05:51

## 2022-04-08 RX ADMIN — Medication 100 MILLIGRAM(S): at 11:25

## 2022-04-08 RX ADMIN — Medication 1 MILLIGRAM(S): at 11:24

## 2022-04-08 NOTE — DISCHARGE NOTE PROVIDER - NSDCMRMEDTOKEN_GEN_ALL_CORE_FT
aluminum hydroxide-magnesium hydroxide 200 mg-200 mg/5 mL oral suspension: 30 milliliter(s) orally every 4 hours, As needed, Dyspepsia  calcium acetate 667 mg oral tablet: 1 tab(s) orally 3 times a day  cyanocobalamin 1000 mcg oral tablet: 1 tab(s) orally once a day  folic acid 1 mg oral tablet: 1 tab(s) orally once a day  gabapentin 100 mg oral capsule: 2 cap(s) orally once a day  metoclopramide 10 mg oral tablet: 1 tab(s) orally 4 times a day  Multiple Vitamins oral tablet: 1 tab(s) orally once a day  oxyCODONE 15 mg oral tablet, extended release: 1 tab(s) orally every 12 hours MDD:2 tabs  oxycodone-acetaminophen 5 mg-325 mg oral tablet: 2 tab(s) orally every 6 hours, As Needed -Moderate Pain (4 - 6) MDD:4   pancrelipase 6000 units-19,000 units-30,000 units oral delayed release capsule: 2 cap(s) orally 3 times a day (with meals)  pantoprazole 40 mg oral delayed release tablet: 1 tab(s) orally 2 times a day   pantoprazole 40 mg oral delayed release tablet: 1 tab(s) orally 2 times a day   Reglan 10 mg oral tablet: 1 tab(s) orally 4 times a day   senna oral tablet: 2 tab(s) orally once a day (at bedtime)  sodium bicarbonate 650 mg oral tablet: 1 tab(s) orally every 6 hours  sucralfate 1 g oral tablet: 1 tab(s) orally every 6 hours   tamsulosin 0.4 mg oral capsule: 1 cap(s) orally once a day (at bedtime)  thiamine 100 mg oral tablet: 1 tab(s) orally once a day

## 2022-04-08 NOTE — PROGRESS NOTE ADULT - SUBJECTIVE AND OBJECTIVE BOX
NEPHROLOGY PROGRESS NOTE    CHIEF COMPLAINT:  YUNIEL/CKD    HPI:  Renal function improved with IVF.      ROS:  no SOB    EXAM:  T(F): 97.6 (04-08-22 @ 11:10)  HR: 79 (04-08-22 @ 11:10)  BP: 112/67 (04-08-22 @ 11:10)  RR: 19 (04-08-22 @ 11:10)  SpO2: 98% (04-08-22 @ 11:10)    Conversant, in no apparent distress  Normal respiratory effort, lungs clear bilaterally  Heart RRR with no murmur, no peripheral edema         LABS                             10.9   6.06  )-----------( 211      ( 08 Apr 2022 07:56 )             34.6          04-08    145  |  116<H>  |  50<H>  ----------------------------<  94  3.6   |  23  |  3.88<H>    Ca    8.4<L>      08 Apr 2022 07:56  Phos  4.0     04-07  Mg     4.2     04-07    TPro  5.9<L>  /  Alb  2.8<L>  /  TBili  0.2  /  DBili  x   /  AST  11<L>  /  ALT  9<L>  /  AlkPhos  87  04-07             ASSESSMENT:  1.  YUNIEL on CKD(4) - prerenal azotemia, better  2.  Abdominal pain NOS / chronic pancreatitis  3.  Unintentional weight loss / esophageal stricture and esophagitis    PLAN:  Continue IVF

## 2022-04-08 NOTE — PROGRESS NOTE ADULT - SUBJECTIVE AND OBJECTIVE BOX
see gastroscopy report    Imp: Esophagitis; esophageal Stricture    Plan: Multiple biopsies taken. Stricture at 35cm from the incisors.  Scope unable to pass further. Will check path; Pt on Protonix and carafate. Will discuss transfer to [facility] for more definitive management and EUS

## 2022-04-08 NOTE — ACUTE INTERFACILITY TRANSFER NOTE - PLAN OF CARE
Esophageal Stricture. Multiple biopsies taken. Stricture at 35cm from the incisors.  Scope unable to pass further. Pt. to be transferred to [facility] for more definitive management and EUS

## 2022-04-08 NOTE — ACUTE INTERFACILITY TRANSFER NOTE - HOSPITAL COURSE
61 years old male with h/o ETOH abuse, chronic pancreatitis, esophagitis, hep C, CKD 4 present to ED on 4/6 with complain of abdominal pain. Patient reported mid upper abdominal pain for 7 days, which progressively worsen for last 2 days. Pain is constant, 10/10, aching pain. Denied any nausea or vomiting. Reported decrease oral intake and loss of appetite. Reported significant weight loss over last month ( 30lb weight loss)  In ER pt was hemodynamically stable, afebrile, sat well at RA. EKG with NSR, VR 92, QTc 467, Slight ST changes noted  in II but very poor waveform. No leukocytosis, Plt 310, K 4.4, Cr 5.45 ( 2.91 in 03/05/2022), lipase 119, lactate 1.4, hsTnT 8.3, HIV negative. CT abd/pelvis with findings of chronic pancreatitis. CXR image reviewed, no focal consolidation.           see gastroscopy report    Imp: Esophagitis; esophageal Stricture    Plan: Multiple biopsies taken. Stricture at 35cm from the incisors.  Scope unable to pass further. Will check path; Pt on Protonix and carafate. Will discuss transfer to [facility] for more definitive management and EUS   61 years old male with h/o ETOH abuse, chronic pancreatitis, esophagitis, hep C, CKD 4 present to ED on 4/6 with complain of abdominal pain. Patient reported mid upper abdominal pain for 7 days, which progressively worsen for last 2 days. Pain is constant, 10/10, aching pain. Denied any nausea or vomiting. Reported decrease oral intake and loss of appetite. Reported significant weight loss over last month ( 30lb weight loss)  In ER pt was hemodynamically stable, afebrile, sat well at RA. EKG with NSR, VR 92, QTc 467, Slight ST changes noted  in II but very poor waveform. No leukocytosis, Plt 310, K 4.4, Cr 5.45 ( 2.91 in 03/05/2022), lipase 119, lactate 1.4, hsTnT 8.3, HIV negative. CT abd/pelvis with findings of chronic pancreatitis. CXR image reviewed, no focal consolidation.   Today pt had an endoscopy revealed esophagitis; esophageal Stricture. Multiple biopsies taken. Stricture at 35cm from the incisors.  Scope unable to pass further. Pt. to be transferred to [facility] for more definitive management and EUS     61 years old male with h/o ETOH abuse, chronic pancreatitis, esophagitis, hep C, CKD 4 present to ED with complain of abdominal pain. Patient reported mid upper abdominal pain for 7 days, which progressively worsen for last 2 days. Pain is constant, 10/10, aching pain. Denied any anusea or vomiting. Reported decrease oral intake and loss of appetite. Reported singnificant weight loss over last month ( 30lb weight loss)   Hemodynamically stable, afebrile, sat well at RA. EKG with NSR, VR 92, QTc 467, Slight ST changes in II but very poor waveform. No leukocytosis, Plt 310, K 4.4, Cr 5.45 ( 2.91 in 03/05/2022), lipase 119, lactate 1.4, hsTnT 8.3, HIV negative. CT abd/pelvis with findings of chronic pancreatitis. Etiology of abd pain is otherwise not elucidated. CXR image reviewed, no focal consolidation.     Admitted with YUNIEL on CKD, malnutrition     Problem/Plan - 1:  ·  Problem: Acute on chronic renal failure.   ·  Plan: Cr 5.45 on admission ( 2.91 in 03/05/2022)  Likely due to decrease oral intake/severe dehydration  Improving; now very close to baseline: 5.45 --> 4.78, 3.88, 3.07  C/w IV fluid (LR) but will cut back rate  Monitor renal function  Avoid nephrotoxic substances  Nephrology following ([facility]).     Problem/Plan - 2:  ·  Problem: Unintentional weight loss.   ·  Plan: Significant weight loss of 30 lb within one month period associated with loss of appetite  Had EGD done on 01/28/2022, recommendation was to repeat EGD within a month for further biopsies and possible EUS of the GE junction   GI consulted- Dr Edwards, repeat EGD 4/8 showed severe esophageal stricture and recommendation to transfer to [facility] for further GI management.   Continue pantoprazole 40mg bid.     Problem/Plan - 3:  ·  Problem: Severe protein-calorie malnutrition.   ·  Plan: Nutrition consult  Not tolerating regular diet; will cut back to soft.     Problem/Plan - 4:  ·  Problem: Chronic pancreatitis.   ·  Plan: Chronic pancreatitis, lipase 119  CT abd/pelvis with findings of chronic pancreatitis.  Pain control w/ PO diluadid  Continue pancrelipase.

## 2022-04-08 NOTE — DISCHARGE NOTE PROVIDER - HOSPITAL COURSE
NP Progress Note     HPI:  61 years old male with h/o ETOH abuse, chronic pancreatitis, esophagitis, hep C, CKD 4 present to ED with complain of abdominal pain. Patient reported mid upper abdominal pain for 7 days, which progressively worsen for last 2 days. Pain is constant, 10/10, aching pain. Denied any anusea or vomiting. Reported decrease oral intake and loss of appetite. Reported singnificant weight loss over last month ( 30lb weight loss)   Hemodynamically stable, afebrile, sat well at RA. EKG with NSR, VR 92, QTc 467, Slight ST changes in II but very poor waveform. No leukocytosis, Plt 310, K 4.4, Cr 5.45 ( 2.91 in 03/05/2022), lipase 119, lactate 1.4, hsTnT 8.3, HIV negative. CT abd/pelvis with findings of chronic pancreatitis. Etiology of abd pain is otherwise not elucidated. CXR image reviewed, no focal consolidation.     SH: former ETHO use  FH: no family h/o HTN, DM (06 Apr 2022 17:14)    Subjective/Observations: Pt. seen and examined and evaluated. Pt. resting comfortably in bed in NAD, with no respiratory distress, no chest pain, dyspnea, palpitations, PND, or orthopnea.    REVIEW OF SYSTEMS: All other review of systems is negative unless indicated above      MEDICATIONS  (STANDING):  folic acid 1 milliGRAM(s) Oral daily  gabapentin 200 milliGRAM(s) Oral at bedtime  lactated ringers. 1000 milliLiter(s) (100 mL/Hr) IV Continuous <Continuous>  lactated ringers. 1000 milliLiter(s) (125 mL/Hr) IV Continuous <Continuous>  metoclopramide 10 milliGRAM(s) Oral four times a day  multivitamin 1 Tablet(s) Oral daily  pancrelipase  (CREON  6,000 Lipase Units) 2 Capsule(s) Oral three times a day with meals  pantoprazole   Suspension 40 milliGRAM(s) Oral two times a day  senna 2 Tablet(s) Oral at bedtime  sucralfate suspension 1 Gram(s) Oral four times a day  tamsulosin 0.4 milliGRAM(s) Oral at bedtime  thiamine 100 milliGRAM(s) Oral daily    MEDICATIONS  (PRN):  acetaminophen     Tablet .. 650 milliGRAM(s) Oral every 6 hours PRN Mild Pain (1 - 3), Moderate Pain (4 - 6)  HYDROmorphone   Tablet 4 milliGRAM(s) Oral four times a day PRN Severe Pain (7 - 10)  melatonin 3 milliGRAM(s) Oral at bedtime PRN Insomnia  ondansetron Injectable 4 milliGRAM(s) IV Push once PRN Nausea and/or Vomiting      Allergies: No Known Allergies    Vital Signs Last 24 Hrs  T(C): 36.4 (08 Apr 2022 11:10), Max: 36.6 (08 Apr 2022 05:36)  T(F): 97.6 (08 Apr 2022 11:10), Max: 97.8 (08 Apr 2022 05:36)  HR: 79 (08 Apr 2022 11:10) (60 - 83)  BP: 112/67 (08 Apr 2022 11:10) (96/49 - 135/70)  BP(mean): --  RR: 19 (08 Apr 2022 11:10) (8 - 19)  SpO2: 98% (08 Apr 2022 11:10) (96% - 100%)    I&O's Summary    07 Apr 2022 07:01  -  08 Apr 2022 07:00  --------------------------------------------------------  IN: 2000 mL / OUT: 575 mL / NET: 1425 mL    08 Apr 2022 07:01  -  08 Apr 2022 12:28  --------------------------------------------------------  IN: 100 mL / OUT: 0 mL / NET: 100 mL      LABS: All Labs Reviewed:                        10.9   6.06  )-----------( 211      ( 08 Apr 2022 07:56 )             34.6     04-08    145  |  116<H>  |  50<H>  ----------------------------<  94  3.6   |  23  |  3.88<H>    Ca    8.4<L>      08 Apr 2022 07:56  Phos  4.0     04-07  Mg     4.2     04-07    TPro  5.9<L>  /  Alb  2.8<L>  /  TBili  0.2  /  DBili  x   /  AST  11<L>  /  ALT  9<L>  /  AlkPhos  87  04-07      Physical Exam:  Appearance: [ ] Normal  [ ] abnormal [X ] NAD   Eyes: [ ] PERRL [ ] EOMI  HEENT: [ ] Normal [ ] Abnormal oral mucosa [ ]NC/AT  Cardiovascular: [X ] S1 [ X] S2 [ ] RRR [ ] m/r/g [ ]edema [ ] JVP  Procedural Access Site: [ ]  hematoma [ ] tender to palpation [ ] 2+ pulse [ ] bruit [ ] Ecchymosis  Respiratory: [X ] Clear to auscultation bilaterally  Gastrointestinal: [ ] Soft [ ] tenderness[ ] distension [ ] BS  Musculoskeletal: [ ] clubbing [ ] joint deformity   Neurologic: [ ] Non-focal  Lymphatic: [ ] lymphadenopathy

## 2022-04-08 NOTE — DISCHARGE NOTE PROVIDER - NSDCFUSCHEDAPPT_GEN_ALL_CORE_FT
IMELDA ROBERTSON ; 04/20/2022 ; [facility] [id number] [facility] ; 04/[facility] ; NPP Neuro Pain [address]  IMELDA ROBERTSON ; 05/04/2022 ; [facility] [id number] [facility] ; 06/23/2022 ; NPP Gastro OP [id number] [facility]

## 2022-04-09 LAB
HCT VFR BLD CALC: 38.1 % — LOW (ref 39–50)
HGB BLD-MCNC: 11.8 G/DL — LOW (ref 13–17)
MCHC RBC-ENTMCNC: 28.2 PG — SIGNIFICANT CHANGE UP (ref 27–34)
MCHC RBC-ENTMCNC: 31 G/DL — LOW (ref 32–36)
MCV RBC AUTO: 91.1 FL — SIGNIFICANT CHANGE UP (ref 80–100)
NRBC # BLD: 0 /100 WBCS — SIGNIFICANT CHANGE UP (ref 0–0)
PLATELET # BLD AUTO: 230 K/UL — SIGNIFICANT CHANGE UP (ref 150–400)
RBC # BLD: 4.18 M/UL — LOW (ref 4.2–5.8)
RBC # FLD: 18.1 % — HIGH (ref 10.3–14.5)
WBC # BLD: 9.59 K/UL — SIGNIFICANT CHANGE UP (ref 3.8–10.5)
WBC # FLD AUTO: 9.59 K/UL — SIGNIFICANT CHANGE UP (ref 3.8–10.5)

## 2022-04-09 PROCEDURE — 99232 SBSQ HOSP IP/OBS MODERATE 35: CPT

## 2022-04-09 RX ORDER — HYDROMORPHONE HYDROCHLORIDE 2 MG/ML
1 INJECTION INTRAMUSCULAR; INTRAVENOUS; SUBCUTANEOUS ONCE
Refills: 0 | Status: DISCONTINUED | OUTPATIENT
Start: 2022-04-09 | End: 2022-04-09

## 2022-04-09 RX ADMIN — PANTOPRAZOLE SODIUM 40 MILLIGRAM(S): 20 TABLET, DELAYED RELEASE ORAL at 17:56

## 2022-04-09 RX ADMIN — HYDROMORPHONE HYDROCHLORIDE 1 MILLIGRAM(S): 2 INJECTION INTRAMUSCULAR; INTRAVENOUS; SUBCUTANEOUS at 22:46

## 2022-04-09 RX ADMIN — SODIUM CHLORIDE 100 MILLILITER(S): 9 INJECTION, SOLUTION INTRAVENOUS at 13:42

## 2022-04-09 RX ADMIN — TAMSULOSIN HYDROCHLORIDE 0.4 MILLIGRAM(S): 0.4 CAPSULE ORAL at 22:13

## 2022-04-09 RX ADMIN — HYDROMORPHONE HYDROCHLORIDE 4 MILLIGRAM(S): 2 INJECTION INTRAMUSCULAR; INTRAVENOUS; SUBCUTANEOUS at 16:20

## 2022-04-09 RX ADMIN — Medication 1 GRAM(S): at 13:42

## 2022-04-09 RX ADMIN — SODIUM CHLORIDE 100 MILLILITER(S): 9 INJECTION, SOLUTION INTRAVENOUS at 06:15

## 2022-04-09 RX ADMIN — Medication 10 MILLIGRAM(S): at 12:26

## 2022-04-09 RX ADMIN — Medication 100 MILLIGRAM(S): at 12:28

## 2022-04-09 RX ADMIN — Medication 1 GRAM(S): at 00:22

## 2022-04-09 RX ADMIN — Medication 2 CAPSULE(S): at 08:20

## 2022-04-09 RX ADMIN — Medication 10 MILLIGRAM(S): at 17:56

## 2022-04-09 RX ADMIN — Medication 10 MILLIGRAM(S): at 06:15

## 2022-04-09 RX ADMIN — Medication 1 MILLIGRAM(S): at 12:27

## 2022-04-09 RX ADMIN — HYDROMORPHONE HYDROCHLORIDE 4 MILLIGRAM(S): 2 INJECTION INTRAMUSCULAR; INTRAVENOUS; SUBCUTANEOUS at 17:20

## 2022-04-09 RX ADMIN — Medication 1 GRAM(S): at 06:16

## 2022-04-09 RX ADMIN — SENNA PLUS 2 TABLET(S): 8.6 TABLET ORAL at 22:13

## 2022-04-09 RX ADMIN — Medication 2 CAPSULE(S): at 17:57

## 2022-04-09 RX ADMIN — HYDROMORPHONE HYDROCHLORIDE 4 MILLIGRAM(S): 2 INJECTION INTRAMUSCULAR; INTRAVENOUS; SUBCUTANEOUS at 03:55

## 2022-04-09 RX ADMIN — PANTOPRAZOLE SODIUM 40 MILLIGRAM(S): 20 TABLET, DELAYED RELEASE ORAL at 06:15

## 2022-04-09 RX ADMIN — Medication 1 TABLET(S): at 12:26

## 2022-04-09 RX ADMIN — HYDROMORPHONE HYDROCHLORIDE 4 MILLIGRAM(S): 2 INJECTION INTRAMUSCULAR; INTRAVENOUS; SUBCUTANEOUS at 10:12

## 2022-04-09 RX ADMIN — HYDROMORPHONE HYDROCHLORIDE 1 MILLIGRAM(S): 2 INJECTION INTRAMUSCULAR; INTRAVENOUS; SUBCUTANEOUS at 23:16

## 2022-04-09 RX ADMIN — HYDROMORPHONE HYDROCHLORIDE 4 MILLIGRAM(S): 2 INJECTION INTRAMUSCULAR; INTRAVENOUS; SUBCUTANEOUS at 11:12

## 2022-04-09 RX ADMIN — GABAPENTIN 200 MILLIGRAM(S): 400 CAPSULE ORAL at 22:12

## 2022-04-09 RX ADMIN — SODIUM CHLORIDE 100 MILLILITER(S): 9 INJECTION, SOLUTION INTRAVENOUS at 17:55

## 2022-04-09 RX ADMIN — HYDROMORPHONE HYDROCHLORIDE 4 MILLIGRAM(S): 2 INJECTION INTRAMUSCULAR; INTRAVENOUS; SUBCUTANEOUS at 04:55

## 2022-04-09 RX ADMIN — Medication 10 MILLIGRAM(S): at 00:22

## 2022-04-09 RX ADMIN — Medication 2 CAPSULE(S): at 12:27

## 2022-04-09 NOTE — PROGRESS NOTE ADULT - SUBJECTIVE AND OBJECTIVE BOX
Patient is a 61y old  Male who presents with a chief complaint of YUNIEL on CKD, malnutrition (06 Apr 2022 21:24)       INTERVAL HPI/OVERNIGHT EVENTS: feeling better today.       REVIEW OF SYSTEMS:   Remaining ROS negative    Home Medications:  aluminum hydroxide-magnesium hydroxide 200 mg-200 mg/5 mL oral suspension: 30 milliliter(s) orally every 4 hours, As needed, Dyspepsia (06 Apr 2022 12:31)  Multiple Vitamins oral tablet: 1 tab(s) orally once a day (06 Apr 2022 12:31)        MEDICATIONS  (STANDING):  folic acid 1 milliGRAM(s) Oral daily  gabapentin 200 milliGRAM(s) Oral at bedtime  lactated ringers. 1000 milliLiter(s) (125 mL/Hr) IV Continuous <Continuous>  metoclopramide 10 milliGRAM(s) Oral four times a day  multivitamin 1 Tablet(s) Oral daily  pancrelipase  (CREON  6,000 Lipase Units) 2 Capsule(s) Oral three times a day with meals  pantoprazole    Tablet 40 milliGRAM(s) Oral two times a day  potassium chloride    Tablet ER 20 milliEquivalent(s) Oral every 2 hours  senna 2 Tablet(s) Oral at bedtime  sucralfate 1 Gram(s) Oral four times a day  tamsulosin 0.4 milliGRAM(s) Oral at bedtime  thiamine 100 milliGRAM(s) Oral daily    MEDICATIONS  (PRN):  acetaminophen     Tablet .. 650 milliGRAM(s) Oral every 6 hours PRN Mild Pain (1 - 3), Moderate Pain (4 - 6)  HYDROmorphone   Tablet 4 milliGRAM(s) Oral four times a day PRN Severe Pain (7 - 10)  melatonin 3 milliGRAM(s) Oral at bedtime PRN Insomnia      Allergies    No Known Allergies    Intolerances        Vital Signs Last 24 Hrs  T(C): 36.7 (09 Apr 2022 11:38), Max: 37.2 (08 Apr 2022 16:44)  T(F): 98 (09 Apr 2022 11:38), Max: 98.9 (08 Apr 2022 16:44)  HR: 73 (09 Apr 2022 11:38) (73 - 83)  BP: 156/77 (09 Apr 2022 11:38) (105/60 - 156/77)  BP(mean): --  RR: 17 (09 Apr 2022 11:38) (17 - 18)  SpO2: 96% (09 Apr 2022 11:38) (96% - 99%)    PHYSICAL EXAM:  GENERAL: NAD  HEAD:  Atraumatic, Normocephalic  EYES: EOMI, PERRLA, conjunctiva and sclera clear  ENT: O/P Clear  NECK: Supple, No JVD  NERVOUS SYSTEM:  No focal deficits  CHEST/LUNG: Clear to percussion bilaterally; No rales, rhonchi, wheezing  HEART: Regular rate and rhythm; No murmurs, rubs, or gallops  ABDOMEN: Soft, Nontender, Nondistended; Bowel sounds present  EXTREMITIES:  2+ Peripheral Pulses, No clubbing, cyanosis, or edema  SKIN: No rashes or lesions    LABS:                        11.4   8.98  )-----------( 218      ( 07 Apr 2022 11:04 )             36.8     04-07    142  |  111<H>  |  57<H>  ----------------------------<  108<H>  3.1<L>   |  26  |  4.78<H>    Ca    8.1<L>      07 Apr 2022 11:04  Phos  4.0     04-07  Mg     4.2     04-07    TPro  5.9<L>  /  Alb  2.8<L>  /  TBili  0.2  /  DBili  x   /  AST  11<L>  /  ALT  9<L>  /  AlkPhos  87  04-07    PT/INR - ( 06 Apr 2022 11:01 )   PT: 11.4 sec;   INR: 0.96 ratio         PTT - ( 06 Apr 2022 11:01 )  PTT:32.7 sec    CAPILLARY BLOOD GLUCOSE          RADIOLOGY & ADDITIONAL TESTS:    Imaging Personally Reviewed:  [ ] YES  [ ] NO    Consultant(s) Notes Reviewed:  [ ] YES  [ ] NO    Care Discussed with Consultants/Other Providers [ ] YES  [ ] NO

## 2022-04-09 NOTE — PROGRESS NOTE ADULT - SUBJECTIVE AND OBJECTIVE BOX
Pt stable  tolerated EGD well  spoke with Dr Hinds at [facility] - awaiting transfer to [facility] for further GI intervention - Possible repeat EGD with thinner scope; possible EUS      MEDICATIONS  (STANDING):  folic acid 1 milliGRAM(s) Oral daily  gabapentin 200 milliGRAM(s) Oral at bedtime  lactated ringers. 1000 milliLiter(s) (100 mL/Hr) IV Continuous <Continuous>  lactated ringers. 1000 milliLiter(s) (125 mL/Hr) IV Continuous <Continuous>  metoclopramide 10 milliGRAM(s) Oral four times a day  multivitamin 1 Tablet(s) Oral daily  pancrelipase  (CREON  6,000 Lipase Units) 2 Capsule(s) Oral three times a day with meals  pantoprazole   Suspension 40 milliGRAM(s) Oral two times a day  senna 2 Tablet(s) Oral at bedtime  sucralfate suspension 1 Gram(s) Oral four times a day  tamsulosin 0.4 milliGRAM(s) Oral at bedtime  thiamine 100 milliGRAM(s) Oral daily    MEDICATIONS  (PRN):  acetaminophen     Tablet .. 650 milliGRAM(s) Oral every 6 hours PRN Mild Pain (1 - 3), Moderate Pain (4 - 6)  HYDROmorphone   Tablet 4 milliGRAM(s) Oral four times a day PRN Severe Pain (7 - 10)  melatonin 3 milliGRAM(s) Oral at bedtime PRN Insomnia  ondansetron Injectable 4 milliGRAM(s) IV Push once PRN Nausea and/or Vomiting      Allergies    No Known Allergies    Intolerances        Vital Signs Last 24 Hrs  T(C): 36.7 (09 Apr 2022 11:38), Max: 37.2 (08 Apr 2022 16:44)  T(F): 98 (09 Apr 2022 11:38), Max: 98.9 (08 Apr 2022 16:44)  HR: 73 (09 Apr 2022 11:38) (73 - 83)  BP: 156/77 (09 Apr 2022 11:38) (105/60 - 156/77)  BP(mean): --  RR: 17 (09 Apr 2022 11:38) (17 - 18)  SpO2: 96% (09 Apr 2022 11:38) (96% - 99%)    PHYSICAL EXAM:  General: NAD.  CVS: S1, S2  Chest: air entry bilaterally present  Abd: BS present, soft, non-tender      LABS:                        11.8   9.59  )-----------( 230      ( 09 Apr 2022 08:34 )             38.1     04-08    145  |  116<H>  |  50<H>  ----------------------------<  94  3.6   |  23  |  3.88<H>    Ca    8.4<L>      08 Apr 2022 07:56      continue protonix and carafate  liquid diet  Awaiting transfer to [facility]

## 2022-04-10 LAB
ANION GAP SERPL CALC-SCNC: 6 MMOL/L — SIGNIFICANT CHANGE UP (ref 5–17)
BUN SERPL-MCNC: 30 MG/DL — HIGH (ref 7–23)
CALCIUM SERPL-MCNC: 8.4 MG/DL — LOW (ref 8.5–10.1)
CHLORIDE SERPL-SCNC: 117 MMOL/L — HIGH (ref 96–108)
CO2 SERPL-SCNC: 23 MMOL/L — SIGNIFICANT CHANGE UP (ref 22–31)
CREAT SERPL-MCNC: 3.07 MG/DL — HIGH (ref 0.5–1.3)
EGFR: 22 ML/MIN/1.73M2 — LOW
GLUCOSE SERPL-MCNC: 80 MG/DL — SIGNIFICANT CHANGE UP (ref 70–99)
POTASSIUM SERPL-MCNC: 3.1 MMOL/L — LOW (ref 3.5–5.3)
POTASSIUM SERPL-SCNC: 3.1 MMOL/L — LOW (ref 3.5–5.3)
SODIUM SERPL-SCNC: 146 MMOL/L — HIGH (ref 135–145)

## 2022-04-10 PROCEDURE — 99232 SBSQ HOSP IP/OBS MODERATE 35: CPT

## 2022-04-10 RX ORDER — SODIUM CHLORIDE 9 MG/ML
1000 INJECTION, SOLUTION INTRAVENOUS
Refills: 0 | Status: DISCONTINUED | OUTPATIENT
Start: 2022-04-10 | End: 2022-04-12

## 2022-04-10 RX ORDER — ACETAMINOPHEN 500 MG
1000 TABLET ORAL ONCE
Refills: 0 | Status: COMPLETED | OUTPATIENT
Start: 2022-04-10 | End: 2022-04-10

## 2022-04-10 RX ORDER — POTASSIUM CHLORIDE 20 MEQ
20 PACKET (EA) ORAL
Refills: 0 | Status: COMPLETED | OUTPATIENT
Start: 2022-04-10 | End: 2022-04-10

## 2022-04-10 RX ADMIN — HYDROMORPHONE HYDROCHLORIDE 4 MILLIGRAM(S): 2 INJECTION INTRAMUSCULAR; INTRAVENOUS; SUBCUTANEOUS at 04:03

## 2022-04-10 RX ADMIN — Medication 10 MILLIGRAM(S): at 01:31

## 2022-04-10 RX ADMIN — Medication 1 GRAM(S): at 17:56

## 2022-04-10 RX ADMIN — Medication 1 MILLIGRAM(S): at 11:22

## 2022-04-10 RX ADMIN — Medication 1000 MILLIGRAM(S): at 10:22

## 2022-04-10 RX ADMIN — GABAPENTIN 200 MILLIGRAM(S): 400 CAPSULE ORAL at 23:08

## 2022-04-10 RX ADMIN — Medication 10 MILLIGRAM(S): at 11:21

## 2022-04-10 RX ADMIN — HYDROMORPHONE HYDROCHLORIDE 4 MILLIGRAM(S): 2 INJECTION INTRAMUSCULAR; INTRAVENOUS; SUBCUTANEOUS at 05:23

## 2022-04-10 RX ADMIN — Medication 2 CAPSULE(S): at 17:56

## 2022-04-10 RX ADMIN — HYDROMORPHONE HYDROCHLORIDE 4 MILLIGRAM(S): 2 INJECTION INTRAMUSCULAR; INTRAVENOUS; SUBCUTANEOUS at 11:21

## 2022-04-10 RX ADMIN — Medication 10 MILLIGRAM(S): at 17:56

## 2022-04-10 RX ADMIN — Medication 1 GRAM(S): at 23:08

## 2022-04-10 RX ADMIN — SODIUM CHLORIDE 75 MILLILITER(S): 9 INJECTION, SOLUTION INTRAVENOUS at 16:18

## 2022-04-10 RX ADMIN — Medication 10 MILLIGRAM(S): at 05:50

## 2022-04-10 RX ADMIN — Medication 100 MILLIGRAM(S): at 11:22

## 2022-04-10 RX ADMIN — PANTOPRAZOLE SODIUM 40 MILLIGRAM(S): 20 TABLET, DELAYED RELEASE ORAL at 17:56

## 2022-04-10 RX ADMIN — HYDROMORPHONE HYDROCHLORIDE 4 MILLIGRAM(S): 2 INJECTION INTRAMUSCULAR; INTRAVENOUS; SUBCUTANEOUS at 18:55

## 2022-04-10 RX ADMIN — Medication 10 MILLIGRAM(S): at 23:08

## 2022-04-10 RX ADMIN — PANTOPRAZOLE SODIUM 40 MILLIGRAM(S): 20 TABLET, DELAYED RELEASE ORAL at 05:51

## 2022-04-10 RX ADMIN — Medication 2 CAPSULE(S): at 12:45

## 2022-04-10 RX ADMIN — HYDROMORPHONE HYDROCHLORIDE 4 MILLIGRAM(S): 2 INJECTION INTRAMUSCULAR; INTRAVENOUS; SUBCUTANEOUS at 17:55

## 2022-04-10 RX ADMIN — Medication 1 TABLET(S): at 11:22

## 2022-04-10 RX ADMIN — Medication 1 GRAM(S): at 05:51

## 2022-04-10 RX ADMIN — Medication 20 MILLIEQUIVALENT(S): at 11:21

## 2022-04-10 RX ADMIN — Medication 1 GRAM(S): at 11:22

## 2022-04-10 RX ADMIN — TAMSULOSIN HYDROCHLORIDE 0.4 MILLIGRAM(S): 0.4 CAPSULE ORAL at 23:08

## 2022-04-10 RX ADMIN — SODIUM CHLORIDE 100 MILLILITER(S): 9 INJECTION, SOLUTION INTRAVENOUS at 13:29

## 2022-04-10 RX ADMIN — Medication 20 MILLIEQUIVALENT(S): at 13:29

## 2022-04-10 RX ADMIN — Medication 20 MILLIEQUIVALENT(S): at 16:15

## 2022-04-10 RX ADMIN — HYDROMORPHONE HYDROCHLORIDE 4 MILLIGRAM(S): 2 INJECTION INTRAMUSCULAR; INTRAVENOUS; SUBCUTANEOUS at 12:22

## 2022-04-10 RX ADMIN — SENNA PLUS 2 TABLET(S): 8.6 TABLET ORAL at 23:09

## 2022-04-10 RX ADMIN — Medication 400 MILLIGRAM(S): at 09:22

## 2022-04-10 NOTE — PROGRESS NOTE ADULT - PROBLEM SELECTOR PLAN 4
Chronic pancreatitis, lipase 119  CT abd/pelvis with findings of chronic pancreatitis.  Pain control w/ PO diluadid  Continue pancrelipase

## 2022-04-10 NOTE — PROGRESS NOTE ADULT - PROBLEM SELECTOR PLAN 2
Significant weight loss of 30 lb within one month period associated with loss of appetite  Had EGD done on 01/28/2022, recommendation was to repeat EGD within a month for further biopsies and possible EUS of the GE junction   GI consulted- Dr Edwards, repeat EGD 4/8 showed severe esophageal stricture and recommendation to transfer to [facility] for further GI management.   C/w FLD  Continue pantoprazole 40mg bid
Significant weight loss of 30 lb within one month period associated with loss of appetite  Had EGD done on 01/28/2022, recommendation was to repeat EGD within a month for further biopsies and possible EUS of the GE junction   GI consulted- Dr Edwards, likely repeat EGD tomorrow  NPO after midnight  Continue pantoprazole 40mg bid
Significant weight loss of 30 lb within one month period associated with loss of appetite  Had EGD done on 01/28/2022, recommendation was to repeat EGD within a month for further biopsies and possible EUS of the GE junction   GI consulted- Dr Edwards, repeat EGD 4/8 showed severe esophageal stricture and recommendation to transfer to [facility] for further GI management.   Continue pantoprazole 40mg bid

## 2022-04-10 NOTE — PROGRESS NOTE ADULT - SUBJECTIVE AND OBJECTIVE BOX
Pt with esophageal stricture  +weight loss  Caroline discussed with Dr Hinds - awaiting transfer to [facility]      MEDICATIONS  (STANDING):  folic acid 1 milliGRAM(s) Oral daily  gabapentin 200 milliGRAM(s) Oral at bedtime  lactated ringers. 1000 milliLiter(s) (75 mL/Hr) IV Continuous <Continuous>  metoclopramide 10 milliGRAM(s) Oral four times a day  multivitamin 1 Tablet(s) Oral daily  pancrelipase  (CREON  6,000 Lipase Units) 2 Capsule(s) Oral three times a day with meals  pantoprazole   Suspension 40 milliGRAM(s) Oral two times a day  senna 2 Tablet(s) Oral at bedtime  sucralfate suspension 1 Gram(s) Oral four times a day  tamsulosin 0.4 milliGRAM(s) Oral at bedtime  thiamine 100 milliGRAM(s) Oral daily    MEDICATIONS  (PRN):  acetaminophen     Tablet .. 650 milliGRAM(s) Oral every 6 hours PRN Mild Pain (1 - 3), Moderate Pain (4 - 6)  HYDROmorphone   Tablet 4 milliGRAM(s) Oral four times a day PRN Severe Pain (7 - 10)  melatonin 3 milliGRAM(s) Oral at bedtime PRN Insomnia  ondansetron Injectable 4 milliGRAM(s) IV Push once PRN Nausea and/or Vomiting      Allergies    No Known Allergies    Intolerances        Vital Signs Last 24 Hrs  T(C): 36.8 (10 Apr 2022 17:12), Max: 36.8 (10 Apr 2022 08:00)  T(F): 98.2 (10 Apr 2022 17:12), Max: 98.3 (10 Apr 2022 12:12)  HR: 75 (10 Apr 2022 17:12) (68 - 79)  BP: 142/71 (10 Apr 2022 17:12) (131/75 - 174/92)  BP(mean): --  RR: 18 (10 Apr 2022 17:12) (18 - 18)  SpO2: 100% (10 Apr 2022 17:12) (97% - 100%)    PHYSICAL EXAM:  General: NAD.  CVS: S1, S2  Chest: air entry bilaterally present  Abd: BS present, soft, non-tender      LABS:                        11.8   9.59  )-----------( 230      ( 09 Apr 2022 08:34 )             38.1     04-10    146<H>  |  117<H>  |  30<H>  ----------------------------<  80  3.1<L>   |  23  |  3.07<H>    Ca    8.4<L>      10 Apr 2022 08:13          Transfer to [facility] when bed avialble  continue protonix and carafate for now

## 2022-04-10 NOTE — PROGRESS NOTE ADULT - PROBLEM SELECTOR PLAN 1
Cr 5.45 ( 2.91 in 03/05/2022)  Likely due to decrease oral intake/severe dehydration  Improving  C/w IV fluid  Monitor renal function  Avoid nephrotoxic substances  Nephrology consult- Dr Dukes
Cr 5.45 on admission ( 2.91 in 03/05/2022)  Likely due to decrease oral intake/severe dehydration  Improving; now very close to baseline: 5.45 --> 4.78, 3.88, 3.07  C/w IV fluid (LR) but will cut back rate  Monitor renal function  Avoid nephrotoxic substances  Nephrology following (Tiffani)
Cr 5.45 on admission ( 2.91 in 03/05/2022)  Likely due to decrease oral intake/severe dehydration  Improving  C/w IV fluid  Monitor renal function  Avoid nephrotoxic substances  Nephrology following

## 2022-04-10 NOTE — PROGRESS NOTE ADULT - PROBLEM SELECTOR PLAN 3
Nutrition consult
Nutrition consult
Nutrition consult  Not tolerating regular diet; will cut back to soft

## 2022-04-10 NOTE — PROGRESS NOTE ADULT - SUBJECTIVE AND OBJECTIVE BOX
Patient is a 61y old  Male who presents with a chief complaint of YUNIEL on CKD, malnutrition (09 Apr 2022 15:22)      INTERVAL HPI/OVERNIGHT EVENTS:    Feels well; pt tells me that he's eating well and has no difficulty swallowing; RN states this is not at all true. He is ordered a regular diet, but frequently regurgitates. Plan in place for transfer to [facility], but we're told no beds available today.     REVIEW OF SYSTEMS:   Remaining ROS negative    MEDICATIONS  (STANDING):  folic acid 1 milliGRAM(s) Oral daily  gabapentin 200 milliGRAM(s) Oral at bedtime  lactated ringers. 1000 milliLiter(s) (100 mL/Hr) IV Continuous <Continuous>  lactated ringers. 1000 milliLiter(s) (125 mL/Hr) IV Continuous <Continuous>  metoclopramide 10 milliGRAM(s) Oral four times a day  multivitamin 1 Tablet(s) Oral daily  pancrelipase  (CREON  6,000 Lipase Units) 2 Capsule(s) Oral three times a day with meals  pantoprazole   Suspension 40 milliGRAM(s) Oral two times a day  potassium chloride    Tablet ER 20 milliEquivalent(s) Oral every 2 hours  senna 2 Tablet(s) Oral at bedtime  sucralfate suspension 1 Gram(s) Oral four times a day  tamsulosin 0.4 milliGRAM(s) Oral at bedtime  thiamine 100 milliGRAM(s) Oral daily    MEDICATIONS  (PRN):  acetaminophen     Tablet .. 650 milliGRAM(s) Oral every 6 hours PRN Mild Pain (1 - 3), Moderate Pain (4 - 6)  HYDROmorphone   Tablet 4 milliGRAM(s) Oral four times a day PRN Severe Pain (7 - 10)  melatonin 3 milliGRAM(s) Oral at bedtime PRN Insomnia  ondansetron Injectable 4 milliGRAM(s) IV Push once PRN Nausea and/or Vomiting      Allergies    No Known Allergies    Intolerances        Vital Signs Last 24 Hrs  T(C): 36.8 (10 Apr 2022 12:12), Max: 36.8 (10 Apr 2022 08:00)  T(F): 98.3 (10 Apr 2022 12:12), Max: 98.3 (10 Apr 2022 12:12)  HR: 72 (10 Apr 2022 12:12) (65 - 79)  BP: 155/77 (10 Apr 2022 12:12) (131/75 - 174/92)  BP(mean): --  RR: 18 (10 Apr 2022 12:12) (18 - 18)  SpO2: 99% (10 Apr 2022 12:12) (97% - 100%)    PHYSICAL EXAM:  GENERAL: NAD  HEAD:  Atraumatic, Normocephalic  EYES: EOMI, PERRLA, conjunctiva and sclera clear  ENT: O/P Clear  NECK: Supple, No JVD  NERVOUS SYSTEM:  No focal deficits  CHEST/LUNG: Clear to percussion bilaterally; No rales, rhonchi, wheezing  HEART: Regular rate and rhythm; No murmurs, rubs, or gallops  ABDOMEN: Soft, Nontender, Nondistended; Bowel sounds present  EXTREMITIES:  2+ Peripheral Pulses, No clubbing, cyanosis, or edema  SKIN: No rashes or lesions    LABS:                        11.8   9.59  )-----------( 230      ( 09 Apr 2022 08:34 )             38.1     04-10    146<H>  |  117<H>  |  30<H>  ----------------------------<  80  3.1<L>   |  23  |  3.07<H>    Ca    8.4<L>      10 Apr 2022 08:13          CAPILLARY BLOOD GLUCOSE          RADIOLOGY & ADDITIONAL TESTS:    Imaging Personally Reviewed:  [ ] YES  [ ] NO    Consultant(s) Notes Reviewed:  [ ] YES  [ ] NO    Care Discussed with Consultants/Other Providers [ ] YES  [ ] NO

## 2022-04-10 NOTE — PROGRESS NOTE ADULT - NSPROGADDITIONALINFOA_GEN_ALL_CORE
stable pending transfer to [facility]
Disp: Medically stable for transfer to [facility]; awaiting bed availability

## 2022-04-11 LAB
ANION GAP SERPL CALC-SCNC: 5 MMOL/L — SIGNIFICANT CHANGE UP (ref 5–17)
BUN SERPL-MCNC: 24 MG/DL — HIGH (ref 7–23)
CALCIUM SERPL-MCNC: 8.8 MG/DL — SIGNIFICANT CHANGE UP (ref 8.5–10.1)
CHLORIDE SERPL-SCNC: 117 MMOL/L — HIGH (ref 96–108)
CO2 SERPL-SCNC: 22 MMOL/L — SIGNIFICANT CHANGE UP (ref 22–31)
CREAT SERPL-MCNC: 2.99 MG/DL — HIGH (ref 0.5–1.3)
EGFR: 23 ML/MIN/1.73M2 — LOW
FLUAV AG NPH QL: SIGNIFICANT CHANGE UP
FLUBV AG NPH QL: SIGNIFICANT CHANGE UP
GLUCOSE SERPL-MCNC: 107 MG/DL — HIGH (ref 70–99)
MAGNESIUM SERPL-MCNC: 2.6 MG/DL — SIGNIFICANT CHANGE UP (ref 1.6–2.6)
PHOSPHATE SERPL-MCNC: 1.5 MG/DL — LOW (ref 2.5–4.5)
POTASSIUM SERPL-MCNC: 3.8 MMOL/L — SIGNIFICANT CHANGE UP (ref 3.5–5.3)
POTASSIUM SERPL-SCNC: 3.8 MMOL/L — SIGNIFICANT CHANGE UP (ref 3.5–5.3)
SARS-COV-2 RNA SPEC QL NAA+PROBE: SIGNIFICANT CHANGE UP
SODIUM SERPL-SCNC: 144 MMOL/L — SIGNIFICANT CHANGE UP (ref 135–145)

## 2022-04-11 PROCEDURE — 99233 SBSQ HOSP IP/OBS HIGH 50: CPT

## 2022-04-11 RX ORDER — SODIUM,POTASSIUM PHOSPHATES 278-250MG
1 POWDER IN PACKET (EA) ORAL THREE TIMES A DAY
Refills: 0 | Status: COMPLETED | OUTPATIENT
Start: 2022-04-11 | End: 2022-04-12

## 2022-04-11 RX ORDER — SODIUM,POTASSIUM PHOSPHATES 278-250MG
1 POWDER IN PACKET (EA) ORAL THREE TIMES A DAY
Refills: 0 | Status: DISCONTINUED | OUTPATIENT
Start: 2022-04-11 | End: 2022-04-11

## 2022-04-11 RX ORDER — HYDRALAZINE HCL 50 MG
10 TABLET ORAL EVERY 6 HOURS
Refills: 0 | Status: DISCONTINUED | OUTPATIENT
Start: 2022-04-11 | End: 2022-04-13

## 2022-04-11 RX ORDER — MORPHINE SULFATE 50 MG/1
4 CAPSULE, EXTENDED RELEASE ORAL ONCE
Refills: 0 | Status: DISCONTINUED | OUTPATIENT
Start: 2022-04-11 | End: 2022-04-11

## 2022-04-11 RX ORDER — MORPHINE SULFATE 50 MG/1
1 CAPSULE, EXTENDED RELEASE ORAL EVERY 6 HOURS
Refills: 0 | Status: DISCONTINUED | OUTPATIENT
Start: 2022-04-11 | End: 2022-04-13

## 2022-04-11 RX ORDER — HYDRALAZINE HCL 50 MG
10 TABLET ORAL EVERY 6 HOURS
Refills: 0 | Status: DISCONTINUED | OUTPATIENT
Start: 2022-04-11 | End: 2022-04-11

## 2022-04-11 RX ADMIN — Medication 10 MILLIGRAM(S): at 06:31

## 2022-04-11 RX ADMIN — Medication 1 MILLIGRAM(S): at 12:43

## 2022-04-11 RX ADMIN — Medication 1 TABLET(S): at 12:43

## 2022-04-11 RX ADMIN — PANTOPRAZOLE SODIUM 40 MILLIGRAM(S): 20 TABLET, DELAYED RELEASE ORAL at 17:58

## 2022-04-11 RX ADMIN — Medication 2 CAPSULE(S): at 08:57

## 2022-04-11 RX ADMIN — MORPHINE SULFATE 1 MILLIGRAM(S): 50 CAPSULE, EXTENDED RELEASE ORAL at 13:00

## 2022-04-11 RX ADMIN — PANTOPRAZOLE SODIUM 40 MILLIGRAM(S): 20 TABLET, DELAYED RELEASE ORAL at 06:32

## 2022-04-11 RX ADMIN — Medication 10 MILLIGRAM(S): at 17:58

## 2022-04-11 RX ADMIN — SODIUM CHLORIDE 75 MILLILITER(S): 9 INJECTION, SOLUTION INTRAVENOUS at 17:57

## 2022-04-11 RX ADMIN — Medication 1 PACKET(S): at 15:27

## 2022-04-11 RX ADMIN — HYDROMORPHONE HYDROCHLORIDE 4 MILLIGRAM(S): 2 INJECTION INTRAMUSCULAR; INTRAVENOUS; SUBCUTANEOUS at 06:31

## 2022-04-11 RX ADMIN — Medication 2 CAPSULE(S): at 17:58

## 2022-04-11 RX ADMIN — MORPHINE SULFATE 1 MILLIGRAM(S): 50 CAPSULE, EXTENDED RELEASE ORAL at 12:44

## 2022-04-11 RX ADMIN — SENNA PLUS 2 TABLET(S): 8.6 TABLET ORAL at 21:32

## 2022-04-11 RX ADMIN — HYDROMORPHONE HYDROCHLORIDE 4 MILLIGRAM(S): 2 INJECTION INTRAMUSCULAR; INTRAVENOUS; SUBCUTANEOUS at 22:34

## 2022-04-11 RX ADMIN — Medication 1 GRAM(S): at 12:43

## 2022-04-11 RX ADMIN — GABAPENTIN 200 MILLIGRAM(S): 400 CAPSULE ORAL at 21:33

## 2022-04-11 RX ADMIN — HYDROMORPHONE HYDROCHLORIDE 4 MILLIGRAM(S): 2 INJECTION INTRAMUSCULAR; INTRAVENOUS; SUBCUTANEOUS at 02:06

## 2022-04-11 RX ADMIN — HYDROMORPHONE HYDROCHLORIDE 4 MILLIGRAM(S): 2 INJECTION INTRAMUSCULAR; INTRAVENOUS; SUBCUTANEOUS at 01:36

## 2022-04-11 RX ADMIN — Medication 1 GRAM(S): at 06:31

## 2022-04-11 RX ADMIN — Medication 1 PACKET(S): at 21:34

## 2022-04-11 RX ADMIN — Medication 2 CAPSULE(S): at 12:43

## 2022-04-11 RX ADMIN — MORPHINE SULFATE 1 MILLIGRAM(S): 50 CAPSULE, EXTENDED RELEASE ORAL at 19:10

## 2022-04-11 RX ADMIN — Medication 100 MILLIGRAM(S): at 12:44

## 2022-04-11 RX ADMIN — MORPHINE SULFATE 1 MILLIGRAM(S): 50 CAPSULE, EXTENDED RELEASE ORAL at 18:53

## 2022-04-11 RX ADMIN — Medication 10 MILLIGRAM(S): at 12:43

## 2022-04-11 RX ADMIN — Medication 1 GRAM(S): at 17:58

## 2022-04-11 RX ADMIN — TAMSULOSIN HYDROCHLORIDE 0.4 MILLIGRAM(S): 0.4 CAPSULE ORAL at 21:33

## 2022-04-11 RX ADMIN — MORPHINE SULFATE 4 MILLIGRAM(S): 50 CAPSULE, EXTENDED RELEASE ORAL at 09:15

## 2022-04-11 RX ADMIN — HYDROMORPHONE HYDROCHLORIDE 4 MILLIGRAM(S): 2 INJECTION INTRAMUSCULAR; INTRAVENOUS; SUBCUTANEOUS at 21:34

## 2022-04-11 RX ADMIN — MORPHINE SULFATE 4 MILLIGRAM(S): 50 CAPSULE, EXTENDED RELEASE ORAL at 08:57

## 2022-04-11 NOTE — PROGRESS NOTE ADULT - SUBJECTIVE AND OBJECTIVE BOX
[facility] NEPHROLOGY SERVICES, [facility]  NEPHROLOGY AND HYPERTENSION  [address]  [address]  [place] [address]  [phone]    MD HALIE OLIVIA MD ANDREY GONCHARUK, MD MADHU KORRAPATI, MD YELENA ROSENBERG, MD BINNY KOSHY, MD CHRISTOPHER CAPUTO, MD EDWARD BOVER, MD          Patient events noted  No distress  Hiccups     MEDICATIONS  (STANDING):  folic acid 1 milliGRAM(s) Oral daily  gabapentin 200 milliGRAM(s) Oral at bedtime  lactated ringers. 1000 milliLiter(s) (75 mL/Hr) IV Continuous <Continuous>  metoclopramide 10 milliGRAM(s) Oral four times a day  multivitamin 1 Tablet(s) Oral daily  pancrelipase  (CREON  6,000 Lipase Units) 2 Capsule(s) Oral three times a day with meals  pantoprazole   Suspension 40 milliGRAM(s) Oral two times a day  potassium phosphate / sodium phosphate Powder (PHOS-NaK) 1 Packet(s) Oral three times a day  senna 2 Tablet(s) Oral at bedtime  sucralfate suspension 1 Gram(s) Oral four times a day  tamsulosin 0.4 milliGRAM(s) Oral at bedtime  thiamine 100 milliGRAM(s) Oral daily    MEDICATIONS  (PRN):  acetaminophen     Tablet .. 650 milliGRAM(s) Oral every 6 hours PRN Mild Pain (1 - 3), Moderate Pain (4 - 6)  hydrALAZINE Injectable 10 milliGRAM(s) IV Push every 6 hours PRN Hypertension  HYDROmorphone   Tablet 4 milliGRAM(s) Oral four times a day PRN Severe Pain (7 - 10)  melatonin 3 milliGRAM(s) Oral at bedtime PRN Insomnia  morphine  - Injectable 1 milliGRAM(s) IV Push every 6 hours PRN Severe Pain (7 - 10)  ondansetron Injectable 4 milliGRAM(s) IV Push once PRN Nausea and/or Vomiting      04-10-22 @ 07:01  -  04-11-22 @ 07:00  --------------------------------------------------------  IN: 960 mL / OUT: 0 mL / NET: 960 mL    04-11-22 @ 07:01  -  04-11-22 @ 21:51  --------------------------------------------------------  IN: 500 mL / OUT: 420 mL / NET: 80 mL      PHYSICAL EXAM:      T(C): 37 (04-11-22 @ 16:39), Max: 37.7 (04-11-22 @ 11:00)  HR: 85 (04-11-22 @ 17:55) (76 - 110)  BP: 159/89 (04-11-22 @ 17:55) (118/70 - 204/105)  RR: 19 (04-11-22 @ 16:39) (18 - 19)  SpO2: 99% (04-11-22 @ 16:39) (98% - 99%)  Wt(kg): --  Lungs clear  Heart S1S2  Abd soft NT ND  Extremities:   tr edema                04-11    144  |  117<H>  |  24<H>  ----------------------------<  107<H>  3.8   |  22  |  2.99<H>    Ca    8.8      11 Apr 2022 06:41  Phos  1.5     04-11  Mg     2.6     04-11          Creatinine Trend: 2.99<--, 3.07<--, 3.88<--, 4.78<--, 5.45<--      ASSESSMENT:  1.  YUNIEL on CKD(4) - prerenal azotemia, better  2.  Abdominal pain NOS / chronic pancreatitis  3.  Unintentional weight loss / esophageal stricture and esophagitis    PLAN:  Continue IVF  Replete phos   GI follow up    Austin Dukes MD

## 2022-04-11 NOTE — PROGRESS NOTE ADULT - SUBJECTIVE AND OBJECTIVE BOX
Patient is a 61y old  Male who presents with a chief complaint of YUNIEL on CKD, malnutrition (2022 09:19)    INTERVAL HPI/OVERNIGHT EVENTS: Patients seen and examined at bedside this morning. No acute events overnight. Pt reports his abdominal pain was a 10/10 and received IV morphine which has helped. Pt seen at bedside having vomiting episodes and reporting burning sensation when eating. Currently having hiccups. States pain is tolerable but gets worse after swallowing anything.     MEDICATIONS  (STANDING):  folic acid 1 milliGRAM(s) Oral daily  gabapentin 200 milliGRAM(s) Oral at bedtime  lactated ringers. 1000 milliLiter(s) (75 mL/Hr) IV Continuous <Continuous>  metoclopramide 10 milliGRAM(s) Oral four times a day  multivitamin 1 Tablet(s) Oral daily  pancrelipase  (CREON  6,000 Lipase Units) 2 Capsule(s) Oral three times a day with meals  pantoprazole   Suspension 40 milliGRAM(s) Oral two times a day  senna 2 Tablet(s) Oral at bedtime  sucralfate suspension 1 Gram(s) Oral four times a day  tamsulosin 0.4 milliGRAM(s) Oral at bedtime  thiamine 100 milliGRAM(s) Oral daily    MEDICATIONS  (PRN):  acetaminophen     Tablet .. 650 milliGRAM(s) Oral every 6 hours PRN Mild Pain (1 - 3), Moderate Pain (4 - 6)  hydrALAZINE Injectable 10 milliGRAM(s) IV Push every 6 hours PRN Hypertension  HYDROmorphone   Tablet 4 milliGRAM(s) Oral four times a day PRN Severe Pain (7 - 10)  melatonin 3 milliGRAM(s) Oral at bedtime PRN Insomnia  morphine  - Injectable 1 milliGRAM(s) IV Push every 6 hours PRN Severe Pain (7 - 10)  ondansetron Injectable 4 milliGRAM(s) IV Push once PRN Nausea and/or Vomiting    Allergies    No Known Allergies    Intolerances      REVIEW OF SYSTEMS:  All other systems reviewed and are negative    Vital Signs Last 24 Hrs  T(C): 37.7 (2022 11:00), Max: 37.7 (2022 11:00)  T(F): 99.8 (2022 11:00), Max: 99.8 (2022 11:00)  HR: 92 (2022 12:23) (75 - 110)  BP: 144/79 (2022 12:23) (118/70 - 204/105)  BP(mean): --  RR: 18 (2022 11:00) (18 - 18)  SpO2: 98% (2022 11:00) (98% - 100%)  Daily     Daily Weight in k (2022 05:18)  I&O's Summary    10 Apr 2022 07:01  -  2022 07:00  --------------------------------------------------------  IN: 960 mL / OUT: 0 mL / NET: 960 mL    2022 07:01  -  2022 12:35  --------------------------------------------------------  IN: 0 mL / OUT: 420 mL / NET: -420 mL      CAPILLARY BLOOD GLUCOSE        PHYSICAL EXAM:  GENERAL: NAD, chronically ill appearing  HEAD:  Atraumatic, Normocephalic  EYES: EOMI, PERRLA, conjunctiva and sclera clear  ENMT: No tonsillar erythema, exudates, or enlargement; Dry mucous membranes, Poor dentition, No lesions  NECK: Supple, No JVD, Normal thyroid  NERVOUS SYSTEM:  Alert & Oriented X2, Poor concentration; Motor Strength 4/5 B/L upper and lower extremities; DTRs 2+ intact and symmetric  CHEST/LUNG: Diminished throughout due to effort but Clear to percussion bilaterally; No rales, rhonchi, wheezing, or rubs  HEART: Regular rate and rhythm; No murmurs, rubs, or gallops  ABDOMEN: Soft, tenderness in epigastric region, Nondistended; Bowel sounds present  EXTREMITIES:  2+ Peripheral Pulses, No clubbing, cyanosis, or edema  LYMPH: No lymphadenopathy noted  SKIN: No rashes or lesions    Labs          144  |  117<H>  |  24<H>  ----------------------------<  107<H>  3.8   |  22  |  2.99<H>    Ca    8.8      2022 06:41  Phos  1.5     04-11  Mg     2.6     11

## 2022-04-11 NOTE — PROGRESS NOTE ADULT - SUBJECTIVE AND OBJECTIVE BOX
Pt stable - difficulty swallowing  +esophageal stricture      MEDICATIONS  (STANDING):  folic acid 1 milliGRAM(s) Oral daily  gabapentin 200 milliGRAM(s) Oral at bedtime  lactated ringers. 1000 milliLiter(s) (75 mL/Hr) IV Continuous <Continuous>  metoclopramide 10 milliGRAM(s) Oral four times a day  multivitamin 1 Tablet(s) Oral daily  pancrelipase  (CREON  6,000 Lipase Units) 2 Capsule(s) Oral three times a day with meals  pantoprazole   Suspension 40 milliGRAM(s) Oral two times a day  senna 2 Tablet(s) Oral at bedtime  sucralfate suspension 1 Gram(s) Oral four times a day  tamsulosin 0.4 milliGRAM(s) Oral at bedtime  thiamine 100 milliGRAM(s) Oral daily    MEDICATIONS  (PRN):  acetaminophen     Tablet .. 650 milliGRAM(s) Oral every 6 hours PRN Mild Pain (1 - 3), Moderate Pain (4 - 6)  HYDROmorphone   Tablet 4 milliGRAM(s) Oral four times a day PRN Severe Pain (7 - 10)  melatonin 3 milliGRAM(s) Oral at bedtime PRN Insomnia  ondansetron Injectable 4 milliGRAM(s) IV Push once PRN Nausea and/or Vomiting      Allergies    No Known Allergies    Intolerances        Vital Signs Last 24 Hrs  T(C): 36.8 (11 Apr 2022 05:18), Max: 36.8 (10 Apr 2022 12:12)  T(F): 98.3 (11 Apr 2022 05:18), Max: 98.3 (10 Apr 2022 12:12)  HR: 97 (11 Apr 2022 08:41) (68 - 97)  BP: 204/105 (11 Apr 2022 08:41) (118/70 - 204/105)  BP(mean): --  RR: 18 (11 Apr 2022 05:18) (18 - 18)  SpO2: 99% (11 Apr 2022 05:18) (99% - 100%)    PHYSICAL EXAM:  General: NAD.  CVS: S1, S2  Chest: air entry bilaterally present  Abd: BS present, soft, non-tender      LABS:    04-11    144  |  117<H>  |  24<H>  ----------------------------<  107<H>  3.8   |  22  |  2.99<H>    Ca    8.8      11 Apr 2022 06:41  Phos  1.5     04-11  Mg     2.6     04-11        await transfer to [facility] - Dr Hinds aware

## 2022-04-12 LAB
ALBUMIN SERPL ELPH-MCNC: 2.5 G/DL — LOW (ref 3.3–5)
ALP SERPL-CCNC: 72 U/L — SIGNIFICANT CHANGE UP (ref 40–120)
ALT FLD-CCNC: 17 U/L — SIGNIFICANT CHANGE UP (ref 12–78)
ANION GAP SERPL CALC-SCNC: 4 MMOL/L — LOW (ref 5–17)
AST SERPL-CCNC: 24 U/L — SIGNIFICANT CHANGE UP (ref 15–37)
BILIRUB SERPL-MCNC: 0.3 MG/DL — SIGNIFICANT CHANGE UP (ref 0.2–1.2)
BUN SERPL-MCNC: 26 MG/DL — HIGH (ref 7–23)
CALCIUM SERPL-MCNC: 8.5 MG/DL — SIGNIFICANT CHANGE UP (ref 8.5–10.1)
CHLORIDE SERPL-SCNC: 115 MMOL/L — HIGH (ref 96–108)
CO2 SERPL-SCNC: 24 MMOL/L — SIGNIFICANT CHANGE UP (ref 22–31)
CREAT SERPL-MCNC: 2.71 MG/DL — HIGH (ref 0.5–1.3)
EGFR: 26 ML/MIN/1.73M2 — LOW
GLUCOSE SERPL-MCNC: 96 MG/DL — SIGNIFICANT CHANGE UP (ref 70–99)
HCT VFR BLD CALC: 32.9 % — LOW (ref 39–50)
HGB BLD-MCNC: 10.5 G/DL — LOW (ref 13–17)
MAGNESIUM SERPL-MCNC: 2.2 MG/DL — SIGNIFICANT CHANGE UP (ref 1.6–2.6)
MCHC RBC-ENTMCNC: 29.3 PG — SIGNIFICANT CHANGE UP (ref 27–34)
MCHC RBC-ENTMCNC: 31.9 G/DL — LOW (ref 32–36)
MCV RBC AUTO: 91.9 FL — SIGNIFICANT CHANGE UP (ref 80–100)
NRBC # BLD: 0 /100 WBCS — SIGNIFICANT CHANGE UP (ref 0–0)
PHOSPHATE SERPL-MCNC: 1.3 MG/DL — LOW (ref 2.5–4.5)
PLATELET # BLD AUTO: 113 K/UL — LOW (ref 150–400)
POTASSIUM SERPL-MCNC: 4.4 MMOL/L — SIGNIFICANT CHANGE UP (ref 3.5–5.3)
POTASSIUM SERPL-SCNC: 4.4 MMOL/L — SIGNIFICANT CHANGE UP (ref 3.5–5.3)
PROT SERPL-MCNC: 5.9 GM/DL — LOW (ref 6–8.3)
RBC # BLD: 3.58 M/UL — LOW (ref 4.2–5.8)
RBC # FLD: 18.5 % — HIGH (ref 10.3–14.5)
SODIUM SERPL-SCNC: 143 MMOL/L — SIGNIFICANT CHANGE UP (ref 135–145)
SURGICAL PATHOLOGY STUDY: SIGNIFICANT CHANGE UP
WBC # BLD: 6.91 K/UL — SIGNIFICANT CHANGE UP (ref 3.8–10.5)
WBC # FLD AUTO: 6.91 K/UL — SIGNIFICANT CHANGE UP (ref 3.8–10.5)

## 2022-04-12 PROCEDURE — 99233 SBSQ HOSP IP/OBS HIGH 50: CPT

## 2022-04-12 RX ORDER — SODIUM,POTASSIUM PHOSPHATES 278-250MG
1 POWDER IN PACKET (EA) ORAL
Refills: 0 | Status: COMPLETED | OUTPATIENT
Start: 2022-04-12 | End: 2022-04-13

## 2022-04-12 RX ORDER — AMLODIPINE BESYLATE 2.5 MG/1
10 TABLET ORAL DAILY
Refills: 0 | Status: DISCONTINUED | OUTPATIENT
Start: 2022-04-12 | End: 2022-04-13

## 2022-04-12 RX ADMIN — Medication 1 GRAM(S): at 00:45

## 2022-04-12 RX ADMIN — MORPHINE SULFATE 1 MILLIGRAM(S): 50 CAPSULE, EXTENDED RELEASE ORAL at 09:05

## 2022-04-12 RX ADMIN — MORPHINE SULFATE 1 MILLIGRAM(S): 50 CAPSULE, EXTENDED RELEASE ORAL at 16:35

## 2022-04-12 RX ADMIN — SODIUM CHLORIDE 75 MILLILITER(S): 9 INJECTION, SOLUTION INTRAVENOUS at 05:32

## 2022-04-12 RX ADMIN — Medication 1 GRAM(S): at 12:05

## 2022-04-12 RX ADMIN — Medication 1 PACKET(S): at 05:33

## 2022-04-12 RX ADMIN — TAMSULOSIN HYDROCHLORIDE 0.4 MILLIGRAM(S): 0.4 CAPSULE ORAL at 21:24

## 2022-04-12 RX ADMIN — Medication 1 PACKET(S): at 12:06

## 2022-04-12 RX ADMIN — Medication 100 MILLIGRAM(S): at 12:06

## 2022-04-12 RX ADMIN — Medication 62.5 MILLIMOLE(S): at 21:24

## 2022-04-12 RX ADMIN — Medication 10 MILLIGRAM(S): at 00:45

## 2022-04-12 RX ADMIN — Medication 10 MILLIGRAM(S): at 05:33

## 2022-04-12 RX ADMIN — AMLODIPINE BESYLATE 10 MILLIGRAM(S): 2.5 TABLET ORAL at 13:11

## 2022-04-12 RX ADMIN — Medication 1 GRAM(S): at 18:13

## 2022-04-12 RX ADMIN — MORPHINE SULFATE 1 MILLIGRAM(S): 50 CAPSULE, EXTENDED RELEASE ORAL at 08:50

## 2022-04-12 RX ADMIN — Medication 10 MILLIGRAM(S): at 12:06

## 2022-04-12 RX ADMIN — Medication 1 GRAM(S): at 05:34

## 2022-04-12 RX ADMIN — GABAPENTIN 200 MILLIGRAM(S): 400 CAPSULE ORAL at 21:24

## 2022-04-12 RX ADMIN — MORPHINE SULFATE 1 MILLIGRAM(S): 50 CAPSULE, EXTENDED RELEASE ORAL at 01:16

## 2022-04-12 RX ADMIN — Medication 2 CAPSULE(S): at 18:13

## 2022-04-12 RX ADMIN — PANTOPRAZOLE SODIUM 40 MILLIGRAM(S): 20 TABLET, DELAYED RELEASE ORAL at 18:13

## 2022-04-12 RX ADMIN — Medication 2 CAPSULE(S): at 12:06

## 2022-04-12 RX ADMIN — SENNA PLUS 2 TABLET(S): 8.6 TABLET ORAL at 21:24

## 2022-04-12 RX ADMIN — Medication 1 PACKET(S): at 18:13

## 2022-04-12 RX ADMIN — PANTOPRAZOLE SODIUM 40 MILLIGRAM(S): 20 TABLET, DELAYED RELEASE ORAL at 05:32

## 2022-04-12 RX ADMIN — Medication 1 TABLET(S): at 12:06

## 2022-04-12 RX ADMIN — Medication 2 CAPSULE(S): at 07:53

## 2022-04-12 RX ADMIN — MORPHINE SULFATE 1 MILLIGRAM(S): 50 CAPSULE, EXTENDED RELEASE ORAL at 01:01

## 2022-04-12 RX ADMIN — MORPHINE SULFATE 1 MILLIGRAM(S): 50 CAPSULE, EXTENDED RELEASE ORAL at 16:19

## 2022-04-12 RX ADMIN — SODIUM CHLORIDE 75 MILLILITER(S): 9 INJECTION, SOLUTION INTRAVENOUS at 12:05

## 2022-04-12 RX ADMIN — Medication 10 MILLIGRAM(S): at 18:14

## 2022-04-12 RX ADMIN — Medication 1 MILLIGRAM(S): at 12:08

## 2022-04-12 NOTE — PROGRESS NOTE ADULT - SUBJECTIVE AND OBJECTIVE BOX
Pt stable; +dysphagia  Pt with esophageal stricture - also concerned about possible lesion at GE junction      MEDICATIONS  (STANDING):  folic acid 1 milliGRAM(s) Oral daily  gabapentin 200 milliGRAM(s) Oral at bedtime  lactated ringers. 1000 milliLiter(s) (75 mL/Hr) IV Continuous <Continuous>  metoclopramide 10 milliGRAM(s) Oral four times a day  multivitamin 1 Tablet(s) Oral daily  pancrelipase  (CREON  6,000 Lipase Units) 2 Capsule(s) Oral three times a day with meals  pantoprazole   Suspension 40 milliGRAM(s) Oral two times a day  potassium phosphate / sodium phosphate Powder (PHOS-NaK) 1 Packet(s) Oral four times a day  senna 2 Tablet(s) Oral at bedtime  sucralfate suspension 1 Gram(s) Oral four times a day  tamsulosin 0.4 milliGRAM(s) Oral at bedtime  thiamine 100 milliGRAM(s) Oral daily    MEDICATIONS  (PRN):  acetaminophen     Tablet .. 650 milliGRAM(s) Oral every 6 hours PRN Mild Pain (1 - 3), Moderate Pain (4 - 6)  hydrALAZINE Injectable 10 milliGRAM(s) IV Push every 6 hours PRN Hypertension  HYDROmorphone   Tablet 4 milliGRAM(s) Oral four times a day PRN Severe Pain (7 - 10)  melatonin 3 milliGRAM(s) Oral at bedtime PRN Insomnia  morphine  - Injectable 1 milliGRAM(s) IV Push every 6 hours PRN Severe Pain (7 - 10)  ondansetron Injectable 4 milliGRAM(s) IV Push once PRN Nausea and/or Vomiting      Allergies    No Known Allergies    Intolerances        Vital Signs Last 24 Hrs  T(C): 36.6 (12 Apr 2022 11:00), Max: 37 (11 Apr 2022 16:39)  T(F): 97.9 (12 Apr 2022 11:00), Max: 98.6 (11 Apr 2022 16:39)  HR: 74 (12 Apr 2022 11:00) (74 - 92)  BP: 166/74 (12 Apr 2022 11:00) (116/62 - 166/74)  BP(mean): --  RR: 18 (12 Apr 2022 11:00) (18 - 19)  SpO2: 99% (12 Apr 2022 11:00) (96% - 99%)    PHYSICAL EXAM:  General: NAD.  CVS: S1, S2  Chest: air entry bilaterally present  Abd: BS present, soft, non-tender      LABS:                        10.5   6.91  )-----------( 113      ( 12 Apr 2022 06:34 )             32.9     04-12    143  |  115<H>  |  26<H>  ----------------------------<  96  4.4   |  24  |  2.71<H>    Ca    8.5      12 Apr 2022 06:34  Phos  1.3     04-12  Mg     2.2     04-12    TPro  5.9<L>  /  Alb  2.5<L>  /  TBili  0.3  /  DBili  x   /  AST  24  /  ALT  17  /  AlkPhos  72  04-12        Await transfer to [facility]; Dr Hinds aware         Pt stable; +dysphagia  Pt with esophageal stricture - also concerned about possible lesion at GE junction      MEDICATIONS  (STANDING):  folic acid 1 milliGRAM(s) Oral daily  gabapentin 200 milliGRAM(s) Oral at bedtime  lactated ringers. 1000 milliLiter(s) (75 mL/Hr) IV Continuous <Continuous>  metoclopramide 10 milliGRAM(s) Oral four times a day  multivitamin 1 Tablet(s) Oral daily  pancrelipase  (CREON  6,000 Lipase Units) 2 Capsule(s) Oral three times a day with meals  pantoprazole   Suspension 40 milliGRAM(s) Oral two times a day  potassium phosphate / sodium phosphate Powder (PHOS-NaK) 1 Packet(s) Oral four times a day  senna 2 Tablet(s) Oral at bedtime  sucralfate suspension 1 Gram(s) Oral four times a day  tamsulosin 0.4 milliGRAM(s) Oral at bedtime  thiamine 100 milliGRAM(s) Oral daily    MEDICATIONS  (PRN):  acetaminophen     Tablet .. 650 milliGRAM(s) Oral every 6 hours PRN Mild Pain (1 - 3), Moderate Pain (4 - 6)  hydrALAZINE Injectable 10 milliGRAM(s) IV Push every 6 hours PRN Hypertension  HYDROmorphone   Tablet 4 milliGRAM(s) Oral four times a day PRN Severe Pain (7 - 10)  melatonin 3 milliGRAM(s) Oral at bedtime PRN Insomnia  morphine  - Injectable 1 milliGRAM(s) IV Push every 6 hours PRN Severe Pain (7 - 10)  ondansetron Injectable 4 milliGRAM(s) IV Push once PRN Nausea and/or Vomiting      Allergies    No Known Allergies    Intolerances        Vital Signs Last 24 Hrs  T(C): 36.6 (12 Apr 2022 11:00), Max: 37 (11 Apr 2022 16:39)  T(F): 97.9 (12 Apr 2022 11:00), Max: 98.6 (11 Apr 2022 16:39)  HR: 74 (12 Apr 2022 11:00) (74 - 92)  BP: 166/74 (12 Apr 2022 11:00) (116/62 - 166/74)  BP(mean): --  RR: 18 (12 Apr 2022 11:00) (18 - 19)  SpO2: 99% (12 Apr 2022 11:00) (96% - 99%)    PHYSICAL EXAM:  General: NAD.  CVS: S1, S2  Chest: air entry bilaterally present  Abd: BS present, soft, non-tender      LABS:                        10.5   6.91  )-----------( 113      ( 12 Apr 2022 06:34 )             32.9     04-12    143  |  115<H>  |  26<H>  ----------------------------<  96  4.4   |  24  |  2.71<H>    Ca    8.5      12 Apr 2022 06:34  Phos  1.3     04-12  Mg     2.2     04-12    TPro  5.9<L>  /  Alb  2.5<L>  /  TBili  0.3  /  DBili  x   /  AST  24  /  ALT  17  /  AlkPhos  72  04-12        Await transfer to [facility]; Dr Hinds aware  Pt insisting upon trying regular food

## 2022-04-12 NOTE — PROGRESS NOTE ADULT - SUBJECTIVE AND OBJECTIVE BOX
Patient is a 61y old  Male who presents with a chief complaint of YUNIEL on CKD, malnutrition (12 Apr 2022 12:06)    INTERVAL HPI/OVERNIGHT EVENTS: Patients seen and examined at bedside this morning. No acute events overnight. Pt reports he wants to eat a regular diet. Wants more ensure. States his hiccups continues. Awaiting transfer. Reports morphine improves abdominal pain.    MEDICATIONS  (STANDING):  amLODIPine   Tablet 10 milliGRAM(s) Oral daily  folic acid 1 milliGRAM(s) Oral daily  gabapentin 200 milliGRAM(s) Oral at bedtime  metoclopramide 10 milliGRAM(s) Oral four times a day  multivitamin 1 Tablet(s) Oral daily  pancrelipase  (CREON  6,000 Lipase Units) 2 Capsule(s) Oral three times a day with meals  pantoprazole   Suspension 40 milliGRAM(s) Oral two times a day  potassium phosphate / sodium phosphate Powder (PHOS-NaK) 1 Packet(s) Oral four times a day  senna 2 Tablet(s) Oral at bedtime  sucralfate suspension 1 Gram(s) Oral four times a day  tamsulosin 0.4 milliGRAM(s) Oral at bedtime  thiamine 100 milliGRAM(s) Oral daily    MEDICATIONS  (PRN):  acetaminophen     Tablet .. 650 milliGRAM(s) Oral every 6 hours PRN Mild Pain (1 - 3), Moderate Pain (4 - 6)  hydrALAZINE Injectable 10 milliGRAM(s) IV Push every 6 hours PRN Hypertension  HYDROmorphone   Tablet 4 milliGRAM(s) Oral four times a day PRN Severe Pain (7 - 10)  melatonin 3 milliGRAM(s) Oral at bedtime PRN Insomnia  morphine  - Injectable 1 milliGRAM(s) IV Push every 6 hours PRN Severe Pain (7 - 10)  ondansetron Injectable 4 milliGRAM(s) IV Push once PRN Nausea and/or Vomiting    Allergies    No Known Allergies    Intolerances      REVIEW OF SYSTEMS:  All other systems reviewed and are negative    Vital Signs Last 24 Hrs  T(C): 36.6 (12 Apr 2022 11:00), Max: 37 (11 Apr 2022 16:39)  T(F): 97.9 (12 Apr 2022 11:00), Max: 98.6 (11 Apr 2022 16:39)  HR: 74 (12 Apr 2022 11:00) (74 - 85)  BP: 166/74 (12 Apr 2022 11:00) (116/62 - 166/74)  BP(mean): --  RR: 18 (12 Apr 2022 11:00) (18 - 19)  SpO2: 99% (12 Apr 2022 11:00) (96% - 99%)  Daily     Daily   I&O's Summary    11 Apr 2022 07:01  -  12 Apr 2022 07:00  --------------------------------------------------------  IN: 1400 mL / OUT: 420 mL / NET: 980 mL      CAPILLARY BLOOD GLUCOSE        PHYSICAL EXAM:  GENERAL: NAD, chronically ill appearing  HEAD:  Atraumatic, Normocephalic  EYES: EOMI, PERRLA, conjunctiva and sclera clear  ENMT: No tonsillar erythema, exudates, or enlargement; Moist mucous membranes, Good dentition, No lesions  NECK: Supple, No JVD, Normal thyroid  NERVOUS SYSTEM:  Alert & Oriented X2, Poor concentration; Motor Strength 5/5 B/L upper and lower extremities; DTRs 2+ intact and symmetric  CHEST/LUNG: Clear to percussion bilaterally; No rales, rhonchi, wheezing, or rubs  HEART: Regular rate and rhythm; No murmurs, rubs, or gallops  ABDOMEN: Soft, Nontender, Nondistended; Bowel sounds present  EXTREMITIES:  2+ Peripheral Pulses, No clubbing, cyanosis, or edema  LYMPH: No lymphadenopathy noted  SKIN: No rashes or lesions    Labs                          10.5   6.91  )-----------( 113      ( 12 Apr 2022 06:34 )             32.9     04-12    143  |  115<H>  |  26<H>  ----------------------------<  96  4.4   |  24  |  2.71<H>    Ca    8.5      12 Apr 2022 06:34  Phos  1.3     04-12  Mg     2.2     04-12    TPro  5.9<L>  /  Alb  2.5<L>  /  TBili  0.3  /  DBili  x   /  AST  24  /  ALT  17  /  AlkPhos  72  04-12

## 2022-04-12 NOTE — PROGRESS NOTE ADULT - SUBJECTIVE AND OBJECTIVE BOX
[facility] NEPHROLOGY SERVICES, [facility]  NEPHROLOGY AND HYPERTENSION  [address]  [address]  [place] [address]  [phone]    MD HALIE OLIVIA MD ANDREY GONCHARUK, MD MADHU KORRAPATI, MD YELENA ROSENBERG, MD BINNY KOSHY, MD CHRISTOPHER CAPUTO, MD EDWARD BOVER, MD          Patient events noted  No distress    MEDICATIONS  (STANDING):  amLODIPine   Tablet 10 milliGRAM(s) Oral daily  folic acid 1 milliGRAM(s) Oral daily  gabapentin 200 milliGRAM(s) Oral at bedtime  metoclopramide 10 milliGRAM(s) Oral four times a day  multivitamin 1 Tablet(s) Oral daily  pancrelipase  (CREON  6,000 Lipase Units) 2 Capsule(s) Oral three times a day with meals  pantoprazole   Suspension 40 milliGRAM(s) Oral two times a day  potassium phosphate / sodium phosphate Powder (PHOS-NaK) 1 Packet(s) Oral four times a day  senna 2 Tablet(s) Oral at bedtime  sucralfate suspension 1 Gram(s) Oral four times a day  tamsulosin 0.4 milliGRAM(s) Oral at bedtime  thiamine 100 milliGRAM(s) Oral daily    MEDICATIONS  (PRN):  acetaminophen     Tablet .. 650 milliGRAM(s) Oral every 6 hours PRN Mild Pain (1 - 3), Moderate Pain (4 - 6)  hydrALAZINE Injectable 10 milliGRAM(s) IV Push every 6 hours PRN Hypertension  HYDROmorphone   Tablet 4 milliGRAM(s) Oral four times a day PRN Severe Pain (7 - 10)  melatonin 3 milliGRAM(s) Oral at bedtime PRN Insomnia  morphine  - Injectable 1 milliGRAM(s) IV Push every 6 hours PRN Severe Pain (7 - 10)  ondansetron Injectable 4 milliGRAM(s) IV Push once PRN Nausea and/or Vomiting      04-11-22 @ 07:01  -  04-12-22 @ 07:00  --------------------------------------------------------  IN: 1400 mL / OUT: 420 mL / NET: 980 mL      PHYSICAL EXAM:      T(C): 36.5 (04-12-22 @ 17:02), Max: 36.8 (04-12-22 @ 05:23)  HR: 71 (04-12-22 @ 17:02) (71 - 83)  BP: 109/63 (04-12-22 @ 17:02) (109/63 - 166/74)  RR: 18 (04-12-22 @ 17:02) (18 - 18)  SpO2: 98% (04-12-22 @ 17:02) (96% - 99%)  Wt(kg): --  Lungs clear  Heart S1S2  Abd soft NT ND  Extremities:   tr edema                                    10.5   6.91  )-----------( 113      ( 12 Apr 2022 06:34 )             32.9     04-12    143  |  115<H>  |  26<H>  ----------------------------<  96  4.4   |  24  |  2.71<H>    Ca    8.5      12 Apr 2022 06:34  Phos  1.3     04-12  Mg     2.2     04-12    TPro  5.9<L>  /  Alb  2.5<L>  /  TBili  0.3  /  DBili  x   /  AST  24  /  ALT  17  /  AlkPhos  72  04-12      LIVER FUNCTIONS - ( 12 Apr 2022 06:34 )  Alb: 2.5 g/dL / Pro: 5.9 gm/dL / ALK PHOS: 72 U/L / ALT: 17 U/L / AST: 24 U/L / GGT: x           Creatinine Trend: 2.71<--, 2.99<--, 3.07<--, 3.88<--, 4.78<--, 5.45<--      ASSESSMENT:  1.  YUNIEL on CKD(4) - prerenal azotemia, better  2.  Abdominal pain NOS / chronic pancreatitis  3.  Unintentional weight loss / esophageal stricture and esophagitis    PLAN:  Continue IVF  Replete phos IV  GI follow up      Austin Dukes MD

## 2022-04-13 ENCOUNTER — INPATIENT (INPATIENT)
Facility: HOSPITAL | Age: 62
LOS: 9 days | Discharge: HOME HEALTH | End: 2022-04-23
Attending: STUDENT IN AN ORGANIZED HEALTH CARE EDUCATION/TRAINING PROGRAM | Admitting: STUDENT IN AN ORGANIZED HEALTH CARE EDUCATION/TRAINING PROGRAM
Payer: MEDICAID

## 2022-04-13 VITALS
TEMPERATURE: 103 F | OXYGEN SATURATION: 97 % | RESPIRATION RATE: 19 BRPM | SYSTOLIC BLOOD PRESSURE: 133 MMHG | HEART RATE: 105 BPM | DIASTOLIC BLOOD PRESSURE: 67 MMHG

## 2022-04-13 VITALS — SYSTOLIC BLOOD PRESSURE: 124 MMHG | DIASTOLIC BLOOD PRESSURE: 76 MMHG | HEART RATE: 110 BPM

## 2022-04-13 DIAGNOSIS — K25.5 CHRONIC OR UNSPECIFIED GASTRIC ULCER WITH PERFORATION: Chronic | ICD-10-CM

## 2022-04-13 DIAGNOSIS — K92.9 DISEASE OF DIGESTIVE SYSTEM, UNSPECIFIED: ICD-10-CM

## 2022-04-13 LAB
ALBUMIN SERPL ELPH-MCNC: 2.5 G/DL — LOW (ref 3.3–5)
ALP SERPL-CCNC: 73 U/L — SIGNIFICANT CHANGE UP (ref 40–120)
ALT FLD-CCNC: 13 U/L — SIGNIFICANT CHANGE UP (ref 12–78)
ANION GAP SERPL CALC-SCNC: 8 MMOL/L — SIGNIFICANT CHANGE UP (ref 5–17)
AST SERPL-CCNC: 15 U/L — SIGNIFICANT CHANGE UP (ref 15–37)
BILIRUB SERPL-MCNC: 0.2 MG/DL — SIGNIFICANT CHANGE UP (ref 0.2–1.2)
BUN SERPL-MCNC: 28 MG/DL — HIGH (ref 7–23)
CALCIUM SERPL-MCNC: 8.3 MG/DL — LOW (ref 8.5–10.1)
CHLORIDE SERPL-SCNC: 114 MMOL/L — HIGH (ref 96–108)
CO2 SERPL-SCNC: 21 MMOL/L — LOW (ref 22–31)
CREAT SERPL-MCNC: 2.74 MG/DL — HIGH (ref 0.5–1.3)
EGFR: 25 ML/MIN/1.73M2 — LOW
GLUCOSE SERPL-MCNC: 169 MG/DL — HIGH (ref 70–99)
POTASSIUM SERPL-MCNC: 3.7 MMOL/L — SIGNIFICANT CHANGE UP (ref 3.5–5.3)
POTASSIUM SERPL-SCNC: 3.7 MMOL/L — SIGNIFICANT CHANGE UP (ref 3.5–5.3)
PROT SERPL-MCNC: 5.8 GM/DL — LOW (ref 6–8.3)
SODIUM SERPL-SCNC: 143 MMOL/L — SIGNIFICANT CHANGE UP (ref 135–145)

## 2022-04-13 PROCEDURE — 71045 X-RAY EXAM CHEST 1 VIEW: CPT | Mod: 26

## 2022-04-13 PROCEDURE — 99233 SBSQ HOSP IP/OBS HIGH 50: CPT

## 2022-04-13 RX ORDER — HYDRALAZINE HCL 50 MG
7.5 TABLET ORAL ONCE
Refills: 0 | Status: DISCONTINUED | OUTPATIENT
Start: 2022-04-13 | End: 2022-04-13

## 2022-04-13 RX ORDER — PIPERACILLIN AND TAZOBACTAM 4; .5 G/20ML; G/20ML
3.38 INJECTION, POWDER, LYOPHILIZED, FOR SOLUTION INTRAVENOUS ONCE
Refills: 0 | Status: COMPLETED | OUTPATIENT
Start: 2022-04-13 | End: 2022-04-13

## 2022-04-13 RX ORDER — ACETAMINOPHEN 500 MG
1000 TABLET ORAL ONCE
Refills: 0 | Status: COMPLETED | OUTPATIENT
Start: 2022-04-13 | End: 2022-04-13

## 2022-04-13 RX ORDER — SODIUM CHLORIDE 9 MG/ML
1000 INJECTION, SOLUTION INTRAVENOUS ONCE
Refills: 0 | Status: COMPLETED | OUTPATIENT
Start: 2022-04-13 | End: 2022-04-13

## 2022-04-13 RX ORDER — MORPHINE SULFATE 50 MG/1
2 CAPSULE, EXTENDED RELEASE ORAL ONCE
Refills: 0 | Status: DISCONTINUED | OUTPATIENT
Start: 2022-04-13 | End: 2022-04-14

## 2022-04-13 RX ADMIN — Medication 2 CAPSULE(S): at 07:43

## 2022-04-13 RX ADMIN — Medication 100 MILLIGRAM(S): at 11:22

## 2022-04-13 RX ADMIN — Medication 1 MILLIGRAM(S): at 11:13

## 2022-04-13 RX ADMIN — AMLODIPINE BESYLATE 10 MILLIGRAM(S): 2.5 TABLET ORAL at 05:41

## 2022-04-13 RX ADMIN — Medication 10 MILLIGRAM(S): at 05:41

## 2022-04-13 RX ADMIN — Medication 1 GRAM(S): at 05:41

## 2022-04-13 RX ADMIN — Medication 1 PACKET(S): at 00:17

## 2022-04-13 RX ADMIN — Medication 2 CAPSULE(S): at 17:15

## 2022-04-13 RX ADMIN — Medication 1000 MILLIGRAM(S): at 23:54

## 2022-04-13 RX ADMIN — PANTOPRAZOLE SODIUM 40 MILLIGRAM(S): 20 TABLET, DELAYED RELEASE ORAL at 05:41

## 2022-04-13 RX ADMIN — MORPHINE SULFATE 1 MILLIGRAM(S): 50 CAPSULE, EXTENDED RELEASE ORAL at 07:41

## 2022-04-13 RX ADMIN — Medication 10 MILLIGRAM(S): at 00:17

## 2022-04-13 RX ADMIN — MORPHINE SULFATE 1 MILLIGRAM(S): 50 CAPSULE, EXTENDED RELEASE ORAL at 17:41

## 2022-04-13 RX ADMIN — HYDROMORPHONE HYDROCHLORIDE 4 MILLIGRAM(S): 2 INJECTION INTRAMUSCULAR; INTRAVENOUS; SUBCUTANEOUS at 19:43

## 2022-04-13 RX ADMIN — Medication 2 CAPSULE(S): at 11:23

## 2022-04-13 RX ADMIN — PANTOPRAZOLE SODIUM 40 MILLIGRAM(S): 20 TABLET, DELAYED RELEASE ORAL at 17:15

## 2022-04-13 RX ADMIN — Medication 1 TABLET(S): at 11:13

## 2022-04-13 RX ADMIN — MORPHINE SULFATE 1 MILLIGRAM(S): 50 CAPSULE, EXTENDED RELEASE ORAL at 00:45

## 2022-04-13 RX ADMIN — Medication 1 GRAM(S): at 17:15

## 2022-04-13 RX ADMIN — Medication 1 GRAM(S): at 00:17

## 2022-04-13 RX ADMIN — Medication 1 GRAM(S): at 11:12

## 2022-04-13 RX ADMIN — Medication 400 MILLIGRAM(S): at 22:45

## 2022-04-13 RX ADMIN — MORPHINE SULFATE 1 MILLIGRAM(S): 50 CAPSULE, EXTENDED RELEASE ORAL at 08:10

## 2022-04-13 RX ADMIN — MORPHINE SULFATE 1 MILLIGRAM(S): 50 CAPSULE, EXTENDED RELEASE ORAL at 00:17

## 2022-04-13 RX ADMIN — Medication 10 MILLIGRAM(S): at 17:15

## 2022-04-13 RX ADMIN — Medication 10 MILLIGRAM(S): at 11:13

## 2022-04-13 RX ADMIN — Medication 1 PACKET(S): at 05:41

## 2022-04-13 NOTE — PROGRESS NOTE ADULT - ASSESSMENT
61 years old male with h/o ETOH abuse, chronic pancreatitis, esophagitis, hep C, CKD 4 present to ED with complain of abdominal pain. Patient reported mid upper abdominal pain for 7 days, which progressively worsen for last 2 days. Pain is constant, 10/10, aching pain. Denied any anusea or vomiting. Reported decrease oral intake and loss of appetite. Reported singnificant weight loss over last month ( 30lb weight loss)   Hemodynamically stable, afebrile, sat well at RA. EKG with NSR, VR 92, QTc 467, Slight ST changes in II but very poor waveform. No leukocytosis, Plt 310, K 4.4, Cr 5.45 ( 2.91 in 03/05/2022), lipase 119, lactate 1.4, hsTnT 8.3, HIV negative. CT abd/pelvis with findings of chronic pancreatitis. Etiology of abd pain is otherwise not elucidated. CXR image reviewed, no focal consolidation.     Admitted with YUNIEL on CKD, malnutrition with intractable epigastric pain found to have esophageal strictures with unsuccessful EGD pending repeat EUS with transfer to [facility].  (4/11) Called transfer center to inquire about bed availability and was told no transfer initiated in their system. Transfer was initiated at 11:30 AM with accepting hospitalist doctor (Dr. Stiven Soto) with GI Dr. Keith.     Problem/Plan :  ·  Problem: Chronic pancreatitis with esophagitis/gastritis with gastroscopy on (4/8)  ·  Plan: Chronic pancreatitis, lipase 119  (4/8) Gastroscopy: Multiple biopsies taken. Stricture at 35cm from the incisors.  Scope unable to pass further. F/U path; Pt on Protonix and carafate. Transfer to [facility] for more definitive management and EUS accepted by Dr. Keith  As per prior admission: reviewed EGD from 1/28/22 : recommendation was PPI, carafate. Plan was for EGD in 1 month and possible EUS of the GE junction (once inflammation improves) for further Bx, patient to follow-up with surgery and GI as recommended by prior admission at [facility] but never did.   CT abd/pelvis with findings of chronic pancreatitis.  Pain control w/ PO diluadid  Continue pancrelipase.      Problem/Plan :  ·  Problem: Acute on chronic renal failure.   ·  Plan: Cr 5.45 on admission ( 2.91 in 03/05/2022)  Likely due to decrease oral intake/severe dehydration  Improving; now very close to baseline: 5.45 --> 4.78, 3.88, 3.07  C/w IV fluid (LR) but will cut back rate  Monitor renal function  Avoid nephrotoxic substances  Nephrology following ([facility]).    Problem/Plan :  ·  Problem: Unintentional weight loss.   ·  Plan: Significant weight loss of 30 lb within one month period associated with loss of appetite  Had EGD done on 01/28/2022, recommendation was to repeat EGD within a month for further biopsies and possible EUS of the GE junction   GI consulted- Dr Edwards, repeat EGD 4/8 showed severe esophageal stricture and recommendation to transfer to [facility] for further GI management.   Continue pantoprazole 40mg bid.    Problem/Plan :  ·  Problem: Severe protein-calorie malnutrition.   ·  Plan: Nutrition consult  Not tolerating regular diet; will cut back to soft.    Additional Information:  Additional Information: Disp: Medically stable for transfer to [facility]; awaiting bed availability  
61 years old male with h/o ETOH abuse, chronic pancreatitis, esophagitis, hep C, CKD 4 present to ED with complain of abdominal pain. Patient reported mid upper abdominal pain for 7 days, which progressively worsen for last 2 days. Pain is constant, 10/10, aching pain. Denied any anusea or vomiting. Reported decrease oral intake and loss of appetite. Reported singnificant weight loss over last month ( 30lb weight loss)   Hemodynamically stable, afebrile, sat well at RA. EKG with NSR, VR 92, QTc 467, Slight ST changes in II but very poor waveform. No leukocytosis, Plt 310, K 4.4, Cr 5.45 ( 2.91 in 03/05/2022), lipase 119, lactate 1.4, hsTnT 8.3, HIV negative. CT abd/pelvis with findings of chronic pancreatitis. Etiology of abd pain is otherwise not elucidated. CXR image reviewed, no focal consolidation.     Admitted with YUNIEL on CKD, malnutrition with intractable epigastric pain found to have esophageal strictures with unsuccessful EGD pending repeat EUS with transfer to [facility].  (4/11) Called transfer center to inquire about bed availability and was told no transfer initiated in their system. Transfer was initiated at 11:30 AM with accepting hospitalist doctor (Dr. Stiven Soto) with GI Dr. Keith.   (4/12) Awaiting bed at [facility]. Called this morning at 11 AM to verify.     Problem/Plan :  ·  Problem: Chronic pancreatitis with esophagitis/gastritis with gastroscopy on (4/8)  ·  Plan: Chronic pancreatitis, lipase 119  (4/8) Gastroscopy: Multiple biopsies taken. Stricture at 35cm from the incisors.  Scope unable to pass further. F/U path; Pt on Protonix and carafate. Transfer to [facility] for more definitive management and EUS accepted by Dr. Keith  As per prior admission: reviewed EGD from 1/28/22 : recommendation was PPI, carafate. Plan was for EGD in 1 month and possible EUS of the GE junction (once inflammation improves) for further Bx, patient to follow-up with surgery and GI as recommended by prior admission at [facility] but never did.   CT abd/pelvis with findings of chronic pancreatitis.  Pain control w/ PO diluadid  Continue pancrelipase.      Problem/Plan :  ·  Problem: Acute on chronic renal failure.   ·  Plan: Cr 5.45 on admission ( 2.91 in 03/05/2022)  Likely due to decrease oral intake/severe dehydration  Improving; now very close to baseline: 5.45 --> 4.78, 3.88, 3.07  C/w IV fluid (LR) but will cut back rate  Monitor renal function  Avoid nephrotoxic substances  Nephrology following ([facility]).    Problem/Plan :  ·  Problem: Unintentional weight loss.   ·  Plan: Significant weight loss of 30 lb within one month period associated with loss of appetite  Had EGD done on 01/28/2022, recommendation was to repeat EGD within a month for further biopsies and possible EUS of the GE junction   GI consulted- Dr Edwards, repeat EGD 4/8 showed severe esophageal stricture and recommendation to transfer to [facility] for further GI management.   Continue pantoprazole 40mg bid.    Problem/Plan :  ·  Problem: Severe protein-calorie malnutrition.   ·  Plan: Nutrition consult  Not tolerating regular diet; will cut back to soft.    Additional Information:  Additional Information: Disp: Medically stable for transfer to [facility]; awaiting bed availability  
61 years old male with h/o ETOH abuse, chronic pancreatitis, esophagitis, hep C, CKD 4 present to ED with complain of abdominal pain. Patient reported mid upper abdominal pain for 7 days, which progressively worsen for last 2 days. Pain is constant, 10/10, aching pain. Denied any anusea or vomiting. Reported decrease oral intake and loss of appetite. Reported singnificant weight loss over last month ( 30lb weight loss)   Hemodynamically stable, afebrile, sat well at RA. EKG with NSR, VR 92, QTc 467, Slight ST changes in II but very poor waveform. No leukocytosis, Plt 310, K 4.4, Cr 5.45 ( 2.91 in 03/05/2022), lipase 119, lactate 1.4, hsTnT 8.3, HIV negative. CT abd/pelvis with findings of chronic pancreatitis. Etiology of abd pain is otherwise not elucidated. CXR image reviewed, no focal consolidation.     Admitted with YUNIEL on CKD, malnutrition with intractable epigastric pain found to have esophageal strictures with unsuccessful EGD pending repeat EUS with transfer to [facility].  (4/11) Called transfer center to inquire about bed availability and was told no transfer initiated in their system. Transfer was initiated at 11:30 AM with accepting hospitalist doctor (Dr. Stiven Soto) with GI Dr. Keith.   (4/12) Awaiting bed at [facility]. Called this morning at 11 AM to verify.     Problem/Plan :  ·  Problem: Chronic pancreatitis with esophagitis/gastritis with gastroscopy on (4/8)  ·  Plan: Chronic pancreatitis, lipase 119  (4/8) Gastroscopy: Multiple biopsies taken. Stricture at 35cm from the incisors.  Scope unable to pass further. F/U path; Pt on Protonix and carafate. Transfer to [facility] for more definitive management and EUS accepted by Dr. Keith  As per prior admission: reviewed EGD from 1/28/22 : recommendation was PPI, carafate. Plan was for EGD in 1 month and possible EUS of the GE junction (once inflammation improves) for further Bx, patient to follow-up with surgery and GI as recommended by prior admission at [facility] but never did.   CT abd/pelvis with findings of chronic pancreatitis.  Pain control w/ PO diluadid  Continue pancrelipase.      Problem/Plan :  ·  Problem: Acute on chronic renal failure.   ·  Plan: Cr 5.45 on admission ( 2.91 in 03/05/2022)  Likely due to decrease oral intake/severe dehydration  Improving; now very close to baseline: 5.45 --> 4.78, 3.88, 3.07  C/w IV fluid (LR) but will cut back rate  Monitor renal function  Avoid nephrotoxic substances  Nephrology following ([facility]).    Problem/Plan :  ·  Problem: Unintentional weight loss.   ·  Plan: Significant weight loss of 30 lb within one month period associated with loss of appetite  Had EGD done on 01/28/2022, recommendation was to repeat EGD within a month for further biopsies and possible EUS of the GE junction   GI consulted- Dr Edwards, repeat EGD 4/8 showed severe esophageal stricture and recommendation to transfer to [facility] for further GI management.   Continue pantoprazole 40mg bid.    Problem/Plan :  ·  Problem: Severe protein-calorie malnutrition.   ·  Plan: Nutrition consult  Not tolerating regular diet; will cut back to soft.    Dispo: Medically stable for transfer to [facility]; awaiting bed availability.  
61 years old male with h/o ETOH abuse, chronic pancreatitis, esophagitis, hep C, CKD 4 present to ED with complain of abdominal pain. Patient reported mid upper abdominal pain for 7 days, which progressively worsen for last 2 days. Pain is constant, 10/10, aching pain. Denied any anusea or vomiting. Reported decrease oral intake and loss of appetite. Reported singnificant weight loss over last month ( 30lb weight loss)   Hemodynamically stable, afebrile, sat well at RA. EKG with NSR, VR 92, QTc 467, Slight ST changes in II but very poor waveform. No leukocytosis, Plt 310, K 4.4, Cr 5.45 ( 2.91 in 03/05/2022), lipase 119, lactate 1.4, hsTnT 8.3, HIV negative. CT abd/pelvis with findings of chronic pancreatitis. Etiology of abd pain is otherwise not elucidated. CXR image reviewed, no focal consolidation.     Admitted with YUNIEL on CKD, malnutrition

## 2022-04-13 NOTE — PROGRESS NOTE ADULT - SUBJECTIVE AND OBJECTIVE BOX
Pt appears to be tolerating diet  +stricture - needs further evaluation of esophageal stricture and [place]  Awaiting transfer to [facility]      MEDICATIONS  (STANDING):  amLODIPine   Tablet 10 milliGRAM(s) Oral daily  folic acid 1 milliGRAM(s) Oral daily  gabapentin 200 milliGRAM(s) Oral at bedtime  metoclopramide 10 milliGRAM(s) Oral four times a day  multivitamin 1 Tablet(s) Oral daily  pancrelipase  (CREON  6,000 Lipase Units) 2 Capsule(s) Oral three times a day with meals  pantoprazole   Suspension 40 milliGRAM(s) Oral two times a day  senna 2 Tablet(s) Oral at bedtime  sucralfate suspension 1 Gram(s) Oral four times a day  tamsulosin 0.4 milliGRAM(s) Oral at bedtime  thiamine 100 milliGRAM(s) Oral daily    MEDICATIONS  (PRN):  acetaminophen     Tablet .. 650 milliGRAM(s) Oral every 6 hours PRN Mild Pain (1 - 3), Moderate Pain (4 - 6)  hydrALAZINE Injectable 10 milliGRAM(s) IV Push every 6 hours PRN Hypertension  HYDROmorphone   Tablet 4 milliGRAM(s) Oral four times a day PRN Severe Pain (7 - 10)  melatonin 3 milliGRAM(s) Oral at bedtime PRN Insomnia  morphine  - Injectable 1 milliGRAM(s) IV Push every 6 hours PRN Severe Pain (7 - 10)  ondansetron Injectable 4 milliGRAM(s) IV Push once PRN Nausea and/or Vomiting      Allergies    No Known Allergies    Intolerances        Vital Signs Last 24 Hrs  T(C): 36.9 (13 Apr 2022 11:14), Max: 36.9 (13 Apr 2022 11:14)  T(F): 98.5 (13 Apr 2022 11:14), Max: 98.5 (13 Apr 2022 11:14)  HR: 97 (13 Apr 2022 11:14) (71 - 97)  BP: 149/76 (13 Apr 2022 11:14) (109/63 - 149/76)  BP(mean): --  RR: 19 (13 Apr 2022 11:14) (18 - 19)  SpO2: 99% (13 Apr 2022 11:14) (98% - 99%)    PHYSICAL EXAM:  General: NAD.  CVS: S1, S2  Chest: air entry bilaterally present  Abd: BS present, soft, non-tender      LABS:                        10.5   6.91  )-----------( 113      ( 12 Apr 2022 06:34 )             32.9     04-13    143  |  114<H>  |  28<H>  ----------------------------<  169<H>  3.7   |  21<L>  |  2.74<H>    Ca    8.3<L>      13 Apr 2022 06:39  Phos  1.3     04-12  Mg     2.2     04-12    TPro  5.8<L>  /  Alb  2.5<L>  /  TBili  0.2  /  DBili  x   /  AST  15  /  ALT  13  /  AlkPhos  73  04-13        awaiting transfer to [facility]  If pt able to tolerate diet might consider discharge and outpatient f/u with Dr Hinds

## 2022-04-13 NOTE — CHART NOTE - NSCHARTNOTEFT_GEN_A_CORE
I am hospitalist attending for this patient today. Per chart, transfer to [facility] is pending, for further mgmt of severe esophageal stricture.   If anyone at [facility] needs me to discuss case, I can be reached at [phone], or thru the [facility] .
EVENT: Pt is on stretcher to be transferred to [facility]  /94, as per RN    HPI: PT's admitted with YUNIEL on CKD, malnutrition with intractable epigastric pain found to have esophageal strictures with unsuccessful EGD pending repeat EUS with transfer to [facility].  (4/11) Called transfer center to inquire about bed availability and was told no transfer initiated in their system. Transfer was initiated at 11:30 AM with accepting hospitalist doctor (Dr. Stiven Soto) with GI Dr. Keith.   (4/12) Awaiting bed at [facility]. Called this morning at 11 AM to verify.     SUBJECTIVE: n/a    OBJECTIVE:  Vital Signs Last 24 Hrs  T(C): 36.8 (13 Apr 2022 19:51), Max: 36.9 (13 Apr 2022 11:14)  T(F): 98.3 (13 Apr 2022 19:51), Max: 98.5 (13 Apr 2022 11:14)  HR: 110 (13 Apr 2022 19:51) (72 - 110)  BP: 194/94 (13 Apr 2022 19:51) (131/66 - 168/80)  BP(mean): --  RR: 18 (13 Apr 2022 19:51) (18 - 19)  SpO2: 97% (13 Apr 2022 19:51) (97% - 99%)    FOCUSED PHYSICAL EXAM:  Neuro: awake, alert, oriented x 3. No neuro deficit  Cardiovascular: Pulses +2 B/L in lower and upper extremities, HR regular, BP stable, No edema.  Respiratory: Respirations regular, unlabored, breath sounds clear B/L.   GI: Abdomen soft, non-tender, positive bowel sounds.  : no bladder distention noted. No complaints at this time.  Skin: Dry, intact, no bruising, no diaphoresis.    LABS:                        10.5   6.91  )-----------( 113      ( 12 Apr 2022 06:34 )             32.9     04-13    143  |  114<H>  |  28<H>  ----------------------------<  169<H>  3.7   |  21<L>  |  2.74<H>    Ca    8.3<L>      13 Apr 2022 06:39  Phos  1.3     04-12  Mg     2.2     04-12    TPro  5.8<L>  /  Alb  2.5<L>  /  TBili  0.2  /  DBili  x   /  AST  15  /  ALT  13  /  AlkPhos  73  04-13      EKG:   IMAGING:    ASSESSMENT/PROBLEM: Hypertension of 194/94, Tachycardia of 110    PLAN:   1. Hydralazine7.5mg, IVP x 1 dose ordered  2. Monitor response to treatment  3. Cont present care/treatment  4. Supportive care
Per chart pt with PMH: ETOH use, chronic pancreatitis, esophagitis, hep C, CKD 4 present to ED with complain of abdominal pain, found with pancreatitis, YUNIEL on CKD, and esophageal strictures (found s/p gastroscopy 04/08). Per MD note 04/10 "pt tells me that he's eating well and has no difficulty swallowing; RN states this is not at all true."- pt now with difficulty swallowing.   Pending transfer to [facility].     Factors impacting intake: [ ] none [ ] nausea  [ ] vomiting [ ] diarrhea [ ] constipation  [ ]chewing problems [x] swallowing issues  [ ] other:     Diet Prescription: Minced and moist, low fat    Intake: Per flow sheets pt consuming % of meals; tolerating minced and moist as per RN flow sheets    Current Weight: Weight (kg): (04/11) 58kg (04/06) 57.6kg  % Weight Change: weight stable    No noted edema as per flow sheets.     Pertinent Medications: MEDICATIONS  (STANDING):  folic acid 1 milliGRAM(s) Oral daily  gabapentin 200 milliGRAM(s) Oral at bedtime  lactated ringers. 1000 milliLiter(s) (75 mL/Hr) IV Continuous <Continuous>  metoclopramide 10 milliGRAM(s) Oral four times a day  multivitamin 1 Tablet(s) Oral daily  pancrelipase  (CREON  6,000 Lipase Units) 2 Capsule(s) Oral three times a day with meals  pantoprazole   Suspension 40 milliGRAM(s) Oral two times a day  potassium phosphate / sodium phosphate Powder (PHOS-NaK) 1 Packet(s) Oral three times a day  senna 2 Tablet(s) Oral at bedtime  sucralfate suspension 1 Gram(s) Oral four times a day  tamsulosin 0.4 milliGRAM(s) Oral at bedtime  thiamine 100 milliGRAM(s) Oral daily    MEDICATIONS  (PRN):  acetaminophen     Tablet .. 650 milliGRAM(s) Oral every 6 hours PRN Mild Pain (1 - 3), Moderate Pain (4 - 6)  hydrALAZINE Injectable 10 milliGRAM(s) IV Push every 6 hours PRN Hypertension  HYDROmorphone   Tablet 4 milliGRAM(s) Oral four times a day PRN Severe Pain (7 - 10)  melatonin 3 milliGRAM(s) Oral at bedtime PRN Insomnia  morphine  - Injectable 1 milliGRAM(s) IV Push every 6 hours PRN Severe Pain (7 - 10)  ondansetron Injectable 4 milliGRAM(s) IV Push once PRN Nausea and/or Vomiting    Pertinent Labs: 04-11 Na144 mmol/L Glu 107 mg/dL<H> K+ 3.8 mmol/L Cr  2.99 mg/dL<H> BUN 24 mg/dL<H> 04-11 Phos 1.5 mg/dL<L> 04-07 Alb 2.8 g/dL<L>      Skin: Pressure Injury 1: buttocks, Stage I as per flow sheets    Estimated Needs:   [x] no change since previous assessment: 04/07  [ ] recalculated:     Previous Nutrition Diagnosis:   [x] Severe malnutrition in the context of chronic illness  Inadequate protein-energy intake in setting of alcohol use, recurrent pancreatitis  Physical findings of severe muscle wasting and fat loss; 8% weight loss x 3 months  Pt to consume >75% meals/supplements during LOS (met)    Nutrition Diagnosis is [x] ongoing  [ ] resolved [ ] not applicable     New Nutrition Diagnosis: [x] not applicable      Interventions:   Recommend  [x] Continue current diet as ordered  [ ] Change Diet To:  [ ] Nutrition Supplement  [ ] Nutrition Support  [x] Other: swallow evaluation     Monitoring and Evaluation:   [x] PO intake [ x ] Tolerance to diet prescription [ x ] weights [ x ] labs[ x ] follow up per protocol  [ ] other:
EVENT: Received telephone call from RN that pt is c/o of abdominal pain 8/10 but stated oxycodone does not help him.    HPI: 61 years old male with h/o ETOH abuse, chronic pancreatitis, esophagitis, hep C, CKD 4 present to ED with complain of abdominal pain. Patient reported mid upper abdominal pain for 7 days, which progressively worsen for last 2 days. Pain is constant, 10/10, aching pain. Denied any nausea or vomiting.  CT abd/pelvis with findings of chronic pancreatitis. Admitted with chronic pancreatitis,  YUNIEL on CKD, malnutrition.    SUBJECTIVE: "My abdomen hurts. It's a 8/10"    OBJECTIVE:  Vital Signs Last 24 Hrs  T(C): 36.2 (06 Apr 2022 18:30), Max: 36.7 (06 Apr 2022 08:23)  T(F): 97.2 (06 Apr 2022 18:30), Max: 98 (06 Apr 2022 08:23)  HR: 65 (06 Apr 2022 18:30) (65 - 92)  BP: 111/73 (06 Apr 2022 18:30) (111/73 - 134/78)  BP(mean): --  RR: 18 (06 Apr 2022 18:30) (17 - 20)  SpO2: 97% (06 Apr 2022 18:30) (97% - 99%)    FOCUSED PHYSICAL EXAM:  Neuro: awake, alert, oriented x 3. No neuro deficit  Cardiovascular: Pulses +2 B/L in lower and upper extremities, HR regular, BP stable, No edema.  Respiratory: Respirations regular, unlabored, breath sounds clear B/L.   GI: Abdomen soft, non-tender, positive bowel sounds.  : no bladder distention noted. No complaints at this time.  Skin: Dry, intact, no bruising, no diaphoresis.    LABS:                        15.4   10.03 )-----------( 310      ( 06 Apr 2022 11:01 )             49.1     04-06    139  |  105  |  61<H>  ----------------------------<  101<H>  4.4   |  25  |  5.45<H>    Ca    8.5      06 Apr 2022 11:01    TPro  8.2  /  Alb  3.7  /  TBili  0.3  /  DBili  x   /  AST  27  /  ALT  14  /  AlkPhos  126<H>  04-06      EKG:   IMAGING:    ASSESSMENT/PROBLEM: Abdominal pain 2/2 to chronic pancreatitis      PLAN:   1. Morphine 2mg, IVP x 1 dose ordered  2. Monitor response to treatment  3. Consider pain management consult  4. Continue present care/treatment  5. Supportive care

## 2022-04-13 NOTE — PROGRESS NOTE ADULT - REASON FOR ADMISSION
YUNIEL on CKD, malnutrition

## 2022-04-13 NOTE — PROGRESS NOTE ADULT - SUBJECTIVE AND OBJECTIVE BOX
[facility] NEPHROLOGY SERVICES, [facility]  NEPHROLOGY AND HYPERTENSION  [address]  [address]  [place] [address]  [phone]    MD HALIE OLIVIA MD ANDREY GONCHARUK, MD MADHU KORRAPATI, MD YELENA ROSENBERG, MD BINNY KOSHY, MD CHRISTOPHER CAPUTO, MD EDWARD BOVER, MD          Patient events noted    MEDICATIONS  (STANDING):  amLODIPine   Tablet 10 milliGRAM(s) Oral daily  folic acid 1 milliGRAM(s) Oral daily  gabapentin 200 milliGRAM(s) Oral at bedtime  hydrALAZINE Injectable 7.5 milliGRAM(s) IV Push once  metoclopramide 10 milliGRAM(s) Oral four times a day  multivitamin 1 Tablet(s) Oral daily  pancrelipase  (CREON  6,000 Lipase Units) 2 Capsule(s) Oral three times a day with meals  pantoprazole   Suspension 40 milliGRAM(s) Oral two times a day  senna 2 Tablet(s) Oral at bedtime  sucralfate suspension 1 Gram(s) Oral four times a day  tamsulosin 0.4 milliGRAM(s) Oral at bedtime  thiamine 100 milliGRAM(s) Oral daily    MEDICATIONS  (PRN):  acetaminophen     Tablet .. 650 milliGRAM(s) Oral every 6 hours PRN Mild Pain (1 - 3), Moderate Pain (4 - 6)  hydrALAZINE Injectable 10 milliGRAM(s) IV Push every 6 hours PRN Hypertension  HYDROmorphone   Tablet 4 milliGRAM(s) Oral four times a day PRN Severe Pain (7 - 10)  melatonin 3 milliGRAM(s) Oral at bedtime PRN Insomnia  morphine  - Injectable 1 milliGRAM(s) IV Push every 6 hours PRN Severe Pain (7 - 10)  ondansetron Injectable 4 milliGRAM(s) IV Push once PRN Nausea and/or Vomiting      04-13-22 @ 07:01  -  04-13-22 @ 23:15  --------------------------------------------------------  IN: 820 mL / OUT: 0 mL / NET: 820 mL      PHYSICAL EXAM:      T(C): 39.3 (04-13-22 @ 21:37), Max: 39.3 (04-13-22 @ 21:37)  HR: 105 (04-13-22 @ 21:37) (72 - 110)  BP: 133/67 (04-13-22 @ 21:37) (124/76 - 194/94)  RR: 19 (04-13-22 @ 21:37) (18 - 19)  SpO2: 97% (04-13-22 @ 21:37) (97% - 99%)  Wt(kg): --  Lungs clear  Heart S1S2  Abd soft NT ND  Extremities:   tr edema                                    10.5   6.91  )-----------( 113      ( 12 Apr 2022 06:34 )             32.9     04-13    143  |  114<H>  |  28<H>  ----------------------------<  169<H>  3.7   |  21<L>  |  2.74<H>    Ca    8.3<L>      13 Apr 2022 06:39  Phos  1.3     04-12  Mg     2.2     04-12    TPro  5.8<L>  /  Alb  2.5<L>  /  TBili  0.2  /  DBili  x   /  AST  15  /  ALT  13  /  AlkPhos  73  04-13      LIVER FUNCTIONS - ( 13 Apr 2022 06:39 )  Alb: 2.5 g/dL / Pro: 5.8 gm/dL / ALK PHOS: 73 U/L / ALT: 13 U/L / AST: 15 U/L / GGT: x           Creatinine Trend: 2.74<--, 2.71<--, 2.99<--, 3.07<--, 3.88<--, 4.78<--        ASSESSMENT:  1.  YUNIEL on CKD(4) - prerenal azotemia, better  2.  Abdominal pain NOS / chronic pancreatitis  3.  Unintentional weight loss / esophageal stricture and esophagitis    PLAN:    Replete phos po at [place]  GI follow up    Austin Dukes MD

## 2022-04-13 NOTE — PROGRESS NOTE ADULT - PROVIDER SPECIALTY LIST ADULT
Gastroenterology
Hospitalist
Nephrology
Gastroenterology
Hospitalist
Nephrology
Gastroenterology
Hospitalist

## 2022-04-13 NOTE — PROGRESS NOTE ADULT - SUBJECTIVE AND OBJECTIVE BOX
INTERVAL HPI/OVERNIGHT EVENTS: pt seen and examined at bedside. No events overnight.    61y  Vital Signs Last 24 Hrs  T(C): 36.9 (13 Apr 2022 11:14), Max: 36.9 (13 Apr 2022 11:14)  T(F): 98.5 (13 Apr 2022 11:14), Max: 98.5 (13 Apr 2022 11:14)  HR: 97 (13 Apr 2022 11:14) (71 - 97)  BP: 149/76 (13 Apr 2022 11:14) (109/63 - 149/76)  BP(mean): --  RR: 19 (13 Apr 2022 11:14) (18 - 19)  SpO2: 99% (13 Apr 2022 11:14) (98% - 99%)  I&O's Summary    13 Apr 2022 07:01  -  13 Apr 2022 17:00  --------------------------------------------------------  IN: 820 mL / OUT: 0 mL / NET: 820 mL      MEDICATIONS  (STANDING):  amLODIPine   Tablet 10 milliGRAM(s) Oral daily  folic acid 1 milliGRAM(s) Oral daily  gabapentin 200 milliGRAM(s) Oral at bedtime  metoclopramide 10 milliGRAM(s) Oral four times a day  multivitamin 1 Tablet(s) Oral daily  pancrelipase  (CREON  6,000 Lipase Units) 2 Capsule(s) Oral three times a day with meals  pantoprazole   Suspension 40 milliGRAM(s) Oral two times a day  senna 2 Tablet(s) Oral at bedtime  sucralfate suspension 1 Gram(s) Oral four times a day  tamsulosin 0.4 milliGRAM(s) Oral at bedtime  thiamine 100 milliGRAM(s) Oral daily    MEDICATIONS  (PRN):  acetaminophen     Tablet .. 650 milliGRAM(s) Oral every 6 hours PRN Mild Pain (1 - 3), Moderate Pain (4 - 6)  hydrALAZINE Injectable 10 milliGRAM(s) IV Push every 6 hours PRN Hypertension  HYDROmorphone   Tablet 4 milliGRAM(s) Oral four times a day PRN Severe Pain (7 - 10)  melatonin 3 milliGRAM(s) Oral at bedtime PRN Insomnia  morphine  - Injectable 1 milliGRAM(s) IV Push every 6 hours PRN Severe Pain (7 - 10)  ondansetron Injectable 4 milliGRAM(s) IV Push once PRN Nausea and/or Vomiting    LABS:    trop                        10.5   6.91  )-----------( 113      ( 12 Apr 2022 06:34 )             32.9     04-13    143  |  114<H>  |  28<H>  ----------------------------<  169<H>  3.7   |  21<L>  |  2.74<H>    Ca    8.3<L>      13 Apr 2022 06:39  Phos  1.3     04-12  Mg     2.2     04-12    TPro  5.8<L>  /  Alb  2.5<L>  /  TBili  0.2  /  DBili  x   /  AST  15  /  ALT  13  /  AlkPhos  73  04-13        CAPILLARY BLOOD GLUCOSE      RADIOLOGY & ADDITIONAL TESTS:    Imaging Personally Reviewed:  [ ] YES  [ ] NO    Consultant(s) Notes Reviewed:  [x ] YES  [ ] NO    PHYSICAL EXAM:  GENERAL: NAD, chronically ill appearing  HEAD:  Atraumatic, Normocephalic  EYES: EOMI, PERRLA, conjunctiva and sclera clear  ENMT: No tonsillar erythema, exudates, or enlargement; Moist mucous membranes, Good dentition, No lesions  NECK: Supple, No JVD, Normal thyroid  NERVOUS SYSTEM:  Alert & Oriented X2, Poor concentration; Motor Strength 5/5 B/L upper and lower extremities; DTRs 2+ intact and symmetric  CHEST/LUNG: Clear to percussion bilaterally; No rales, rhonchi, wheezing, or rubs  HEART: Regular rate and rhythm; No murmurs, rubs, or gallops  ABDOMEN: Soft, Nontender, Nondistended; Bowel sounds present  EXTREMITIES:  2+ Peripheral Pulses, No clubbing, cyanosis, or edema  LYMPH: No lymphadenopathy noted  SKIN: No rashes or lesions      A & P:        Care Discussed with Consultants/Other Providers [ x] YES  [ ] NO

## 2022-04-14 DIAGNOSIS — E46 UNSPECIFIED PROTEIN-CALORIE MALNUTRITION: ICD-10-CM

## 2022-04-14 DIAGNOSIS — K22.2 ESOPHAGEAL OBSTRUCTION: ICD-10-CM

## 2022-04-14 DIAGNOSIS — N18.4 CHRONIC KIDNEY DISEASE, STAGE 4 (SEVERE): ICD-10-CM

## 2022-04-14 DIAGNOSIS — K20.90 ESOPHAGITIS, UNSPECIFIED WITHOUT BLEEDING: ICD-10-CM

## 2022-04-14 DIAGNOSIS — K59.00 CONSTIPATION, UNSPECIFIED: ICD-10-CM

## 2022-04-14 DIAGNOSIS — R50.9 FEVER, UNSPECIFIED: ICD-10-CM

## 2022-04-14 DIAGNOSIS — Z29.9 ENCOUNTER FOR PROPHYLACTIC MEASURES, UNSPECIFIED: ICD-10-CM

## 2022-04-14 DIAGNOSIS — K86.1 OTHER CHRONIC PANCREATITIS: ICD-10-CM

## 2022-04-14 DIAGNOSIS — K21.9 GASTRO-ESOPHAGEAL REFLUX DISEASE WITHOUT ESOPHAGITIS: ICD-10-CM

## 2022-04-14 LAB
ALBUMIN SERPL ELPH-MCNC: 2.5 G/DL — LOW (ref 3.3–5)
ALP SERPL-CCNC: 84 U/L — SIGNIFICANT CHANGE UP (ref 40–120)
ALT FLD-CCNC: 10 U/L — SIGNIFICANT CHANGE UP (ref 4–41)
ANION GAP SERPL CALC-SCNC: 11 MMOL/L — SIGNIFICANT CHANGE UP (ref 7–14)
ANION GAP SERPL CALC-SCNC: 11 MMOL/L — SIGNIFICANT CHANGE UP (ref 7–14)
APTT BLD: 29.3 SEC — SIGNIFICANT CHANGE UP (ref 27–36.3)
AST SERPL-CCNC: 14 U/L — SIGNIFICANT CHANGE UP (ref 4–40)
B PERT DNA SPEC QL NAA+PROBE: SIGNIFICANT CHANGE UP
B PERT+PARAPERT DNA PNL SPEC NAA+PROBE: SIGNIFICANT CHANGE UP
BASE EXCESS BLDV CALC-SCNC: -4.7 MMOL/L — LOW (ref -2–3)
BASOPHILS # BLD AUTO: 0.04 K/UL — SIGNIFICANT CHANGE UP (ref 0–0.2)
BASOPHILS NFR BLD AUTO: 0.5 % — SIGNIFICANT CHANGE UP (ref 0–2)
BILIRUB SERPL-MCNC: <0.2 MG/DL — SIGNIFICANT CHANGE UP (ref 0.2–1.2)
BLOOD GAS VENOUS COMPREHENSIVE RESULT: SIGNIFICANT CHANGE UP
BLOOD GAS VENOUS COMPREHENSIVE RESULT: SIGNIFICANT CHANGE UP
BORDETELLA PARAPERTUSSIS (RAPRVP): SIGNIFICANT CHANGE UP
BUN SERPL-MCNC: 27 MG/DL — HIGH (ref 7–23)
BUN SERPL-MCNC: 29 MG/DL — HIGH (ref 7–23)
C PNEUM DNA SPEC QL NAA+PROBE: SIGNIFICANT CHANGE UP
CALCIUM SERPL-MCNC: 8.2 MG/DL — LOW (ref 8.4–10.5)
CALCIUM SERPL-MCNC: 8.3 MG/DL — LOW (ref 8.4–10.5)
CHLORIDE BLDV-SCNC: 107 MMOL/L — SIGNIFICANT CHANGE UP (ref 96–108)
CHLORIDE SERPL-SCNC: 106 MMOL/L — SIGNIFICANT CHANGE UP (ref 98–107)
CHLORIDE SERPL-SCNC: 108 MMOL/L — HIGH (ref 98–107)
CO2 BLDV-SCNC: 22.4 MMOL/L — SIGNIFICANT CHANGE UP (ref 22–26)
CO2 SERPL-SCNC: 18 MMOL/L — LOW (ref 22–31)
CO2 SERPL-SCNC: 20 MMOL/L — LOW (ref 22–31)
CREAT SERPL-MCNC: 2.62 MG/DL — HIGH (ref 0.5–1.3)
CREAT SERPL-MCNC: 2.7 MG/DL — HIGH (ref 0.5–1.3)
EGFR: 26 ML/MIN/1.73M2 — LOW
EGFR: 27 ML/MIN/1.73M2 — LOW
EOSINOPHIL # BLD AUTO: 0 K/UL — SIGNIFICANT CHANGE UP (ref 0–0.5)
EOSINOPHIL NFR BLD AUTO: 0 % — SIGNIFICANT CHANGE UP (ref 0–6)
FLUAV SUBTYP SPEC NAA+PROBE: SIGNIFICANT CHANGE UP
FLUBV RNA SPEC QL NAA+PROBE: SIGNIFICANT CHANGE UP
GAS PNL BLDV: 135 MMOL/L — LOW (ref 136–145)
GLUCOSE BLDV-MCNC: 100 MG/DL — HIGH (ref 70–99)
GLUCOSE SERPL-MCNC: 112 MG/DL — HIGH (ref 70–99)
GLUCOSE SERPL-MCNC: 174 MG/DL — HIGH (ref 70–99)
HADV DNA SPEC QL NAA+PROBE: SIGNIFICANT CHANGE UP
HCO3 BLDV-SCNC: 21 MMOL/L — LOW (ref 22–29)
HCOV 229E RNA SPEC QL NAA+PROBE: SIGNIFICANT CHANGE UP
HCOV HKU1 RNA SPEC QL NAA+PROBE: SIGNIFICANT CHANGE UP
HCOV NL63 RNA SPEC QL NAA+PROBE: SIGNIFICANT CHANGE UP
HCOV OC43 RNA SPEC QL NAA+PROBE: SIGNIFICANT CHANGE UP
HCT VFR BLD CALC: 29 % — LOW (ref 39–50)
HCT VFR BLD CALC: 32 % — LOW (ref 39–50)
HCT VFR BLDA CALC: 31 % — LOW (ref 39–51)
HGB BLD CALC-MCNC: 10.3 G/DL — LOW (ref 13–17)
HGB BLD-MCNC: 10 G/DL — LOW (ref 13–17)
HGB BLD-MCNC: 9.3 G/DL — LOW (ref 13–17)
HMPV RNA SPEC QL NAA+PROBE: SIGNIFICANT CHANGE UP
HPIV1 RNA SPEC QL NAA+PROBE: SIGNIFICANT CHANGE UP
HPIV2 RNA SPEC QL NAA+PROBE: SIGNIFICANT CHANGE UP
HPIV3 RNA SPEC QL NAA+PROBE: SIGNIFICANT CHANGE UP
HPIV4 RNA SPEC QL NAA+PROBE: SIGNIFICANT CHANGE UP
IANC: 6.77 K/UL — SIGNIFICANT CHANGE UP (ref 1.8–7.4)
IMM GRANULOCYTES NFR BLD AUTO: 0.6 % — SIGNIFICANT CHANGE UP (ref 0–1.5)
INR BLD: 1.08 RATIO — SIGNIFICANT CHANGE UP (ref 0.88–1.16)
LACTATE BLDV-MCNC: 0.8 MMOL/L — SIGNIFICANT CHANGE UP (ref 0.5–2)
LIDOCAIN IGE QN: 21 U/L — SIGNIFICANT CHANGE UP (ref 7–60)
LYMPHOCYTES # BLD AUTO: 0.64 K/UL — LOW (ref 1–3.3)
LYMPHOCYTES # BLD AUTO: 7.7 % — LOW (ref 13–44)
M PNEUMO DNA SPEC QL NAA+PROBE: SIGNIFICANT CHANGE UP
MAGNESIUM SERPL-MCNC: 1.6 MG/DL — SIGNIFICANT CHANGE UP (ref 1.6–2.6)
MAGNESIUM SERPL-MCNC: 1.9 MG/DL — SIGNIFICANT CHANGE UP (ref 1.6–2.6)
MCHC RBC-ENTMCNC: 28.4 PG — SIGNIFICANT CHANGE UP (ref 27–34)
MCHC RBC-ENTMCNC: 29.2 PG — SIGNIFICANT CHANGE UP (ref 27–34)
MCHC RBC-ENTMCNC: 31.3 GM/DL — LOW (ref 32–36)
MCHC RBC-ENTMCNC: 32.1 GM/DL — SIGNIFICANT CHANGE UP (ref 32–36)
MCV RBC AUTO: 90.9 FL — SIGNIFICANT CHANGE UP (ref 80–100)
MCV RBC AUTO: 90.9 FL — SIGNIFICANT CHANGE UP (ref 80–100)
MONOCYTES # BLD AUTO: 0.8 K/UL — SIGNIFICANT CHANGE UP (ref 0–0.9)
MONOCYTES NFR BLD AUTO: 9.6 % — SIGNIFICANT CHANGE UP (ref 2–14)
NEUTROPHILS # BLD AUTO: 6.77 K/UL — SIGNIFICANT CHANGE UP (ref 1.8–7.4)
NEUTROPHILS NFR BLD AUTO: 81.6 % — HIGH (ref 43–77)
NRBC # BLD: 0 /100 WBCS — SIGNIFICANT CHANGE UP
NRBC # BLD: 0 /100 WBCS — SIGNIFICANT CHANGE UP
NRBC # FLD: 0 K/UL — SIGNIFICANT CHANGE UP
NRBC # FLD: 0 K/UL — SIGNIFICANT CHANGE UP
PCO2 BLDV: 41 MMHG — LOW (ref 42–55)
PH BLDV: 7.32 — SIGNIFICANT CHANGE UP (ref 7.32–7.43)
PHOSPHATE SERPL-MCNC: 2.2 MG/DL — LOW (ref 2.5–4.5)
PHOSPHATE SERPL-MCNC: 3.7 MG/DL — SIGNIFICANT CHANGE UP (ref 2.5–4.5)
PLATELET # BLD AUTO: 136 K/UL — LOW (ref 150–400)
PLATELET # BLD AUTO: 144 K/UL — LOW (ref 150–400)
PO2 BLDV: 53 MMHG — SIGNIFICANT CHANGE UP
POTASSIUM BLDV-SCNC: 3.2 MMOL/L — LOW (ref 3.5–5.1)
POTASSIUM SERPL-MCNC: 3.5 MMOL/L — SIGNIFICANT CHANGE UP (ref 3.5–5.3)
POTASSIUM SERPL-MCNC: 3.8 MMOL/L — SIGNIFICANT CHANGE UP (ref 3.5–5.3)
POTASSIUM SERPL-SCNC: 3.5 MMOL/L — SIGNIFICANT CHANGE UP (ref 3.5–5.3)
POTASSIUM SERPL-SCNC: 3.8 MMOL/L — SIGNIFICANT CHANGE UP (ref 3.5–5.3)
PROT SERPL-MCNC: 4.8 G/DL — LOW (ref 6–8.3)
PROTHROM AB SERPL-ACNC: 12.5 SEC — SIGNIFICANT CHANGE UP (ref 10.5–13.4)
RAPID RVP RESULT: SIGNIFICANT CHANGE UP
RBC # BLD: 3.19 M/UL — LOW (ref 4.2–5.8)
RBC # BLD: 3.52 M/UL — LOW (ref 4.2–5.8)
RBC # FLD: 18.1 % — HIGH (ref 10.3–14.5)
RBC # FLD: 18.3 % — HIGH (ref 10.3–14.5)
RSV RNA SPEC QL NAA+PROBE: SIGNIFICANT CHANGE UP
RV+EV RNA SPEC QL NAA+PROBE: SIGNIFICANT CHANGE UP
SAO2 % BLDV: 86.5 % — SIGNIFICANT CHANGE UP
SARS-COV-2 RNA SPEC QL NAA+PROBE: SIGNIFICANT CHANGE UP
SODIUM SERPL-SCNC: 137 MMOL/L — SIGNIFICANT CHANGE UP (ref 135–145)
SODIUM SERPL-SCNC: 137 MMOL/L — SIGNIFICANT CHANGE UP (ref 135–145)
WBC # BLD: 8.03 K/UL — SIGNIFICANT CHANGE UP (ref 3.8–10.5)
WBC # BLD: 8.2 K/UL — SIGNIFICANT CHANGE UP (ref 3.8–10.5)
WBC # FLD AUTO: 8.03 K/UL — SIGNIFICANT CHANGE UP (ref 3.8–10.5)
WBC # FLD AUTO: 8.2 K/UL — SIGNIFICANT CHANGE UP (ref 3.8–10.5)

## 2022-04-14 PROCEDURE — 99223 1ST HOSP IP/OBS HIGH 75: CPT

## 2022-04-14 PROCEDURE — 93010 ELECTROCARDIOGRAM REPORT: CPT

## 2022-04-14 PROCEDURE — 99232 SBSQ HOSP IP/OBS MODERATE 35: CPT | Mod: GC

## 2022-04-14 RX ORDER — HEPARIN SODIUM 5000 [USP'U]/ML
5000 INJECTION INTRAVENOUS; SUBCUTANEOUS EVERY 8 HOURS
Refills: 0 | Status: DISCONTINUED | OUTPATIENT
Start: 2022-04-14 | End: 2022-04-23

## 2022-04-14 RX ORDER — GABAPENTIN 400 MG/1
200 CAPSULE ORAL AT BEDTIME
Refills: 0 | Status: DISCONTINUED | OUTPATIENT
Start: 2022-04-14 | End: 2022-04-15

## 2022-04-14 RX ORDER — MAGNESIUM SULFATE 500 MG/ML
1 VIAL (ML) INJECTION ONCE
Refills: 0 | Status: COMPLETED | OUTPATIENT
Start: 2022-04-14 | End: 2022-04-14

## 2022-04-14 RX ORDER — PIPERACILLIN AND TAZOBACTAM 4; .5 G/20ML; G/20ML
3.38 INJECTION, POWDER, LYOPHILIZED, FOR SOLUTION INTRAVENOUS ONCE
Refills: 0 | Status: DISCONTINUED | OUTPATIENT
Start: 2022-04-14 | End: 2022-04-14

## 2022-04-14 RX ORDER — HYDROMORPHONE HYDROCHLORIDE 2 MG/ML
0.5 INJECTION INTRAMUSCULAR; INTRAVENOUS; SUBCUTANEOUS EVERY 6 HOURS
Refills: 0 | Status: DISCONTINUED | OUTPATIENT
Start: 2022-04-14 | End: 2022-04-15

## 2022-04-14 RX ORDER — PIPERACILLIN AND TAZOBACTAM 4; .5 G/20ML; G/20ML
3.38 INJECTION, POWDER, LYOPHILIZED, FOR SOLUTION INTRAVENOUS EVERY 8 HOURS
Refills: 0 | Status: DISCONTINUED | OUTPATIENT
Start: 2022-04-14 | End: 2022-04-14

## 2022-04-14 RX ORDER — SODIUM CHLORIDE 9 MG/ML
1000 INJECTION, SOLUTION INTRAVENOUS
Refills: 0 | Status: DISCONTINUED | OUTPATIENT
Start: 2022-04-14 | End: 2022-04-15

## 2022-04-14 RX ORDER — LANOLIN ALCOHOL/MO/W.PET/CERES
3 CREAM (GRAM) TOPICAL AT BEDTIME
Refills: 0 | Status: DISCONTINUED | OUTPATIENT
Start: 2022-04-14 | End: 2022-04-23

## 2022-04-14 RX ORDER — THIAMINE MONONITRATE (VIT B1) 100 MG
100 TABLET ORAL DAILY
Refills: 0 | Status: DISCONTINUED | OUTPATIENT
Start: 2022-04-14 | End: 2022-04-23

## 2022-04-14 RX ORDER — ACETAMINOPHEN 500 MG
500 TABLET ORAL ONCE
Refills: 0 | Status: COMPLETED | OUTPATIENT
Start: 2022-04-14 | End: 2022-04-14

## 2022-04-14 RX ORDER — HYDROMORPHONE HYDROCHLORIDE 2 MG/ML
0.25 INJECTION INTRAMUSCULAR; INTRAVENOUS; SUBCUTANEOUS EVERY 6 HOURS
Refills: 0 | Status: DISCONTINUED | OUTPATIENT
Start: 2022-04-14 | End: 2022-04-15

## 2022-04-14 RX ORDER — HYDROMORPHONE HYDROCHLORIDE 2 MG/ML
0.25 INJECTION INTRAMUSCULAR; INTRAVENOUS; SUBCUTANEOUS EVERY 6 HOURS
Refills: 0 | Status: DISCONTINUED | OUTPATIENT
Start: 2022-04-14 | End: 2022-04-14

## 2022-04-14 RX ORDER — SENNA PLUS 8.6 MG/1
2 TABLET ORAL AT BEDTIME
Refills: 0 | Status: DISCONTINUED | OUTPATIENT
Start: 2022-04-14 | End: 2022-04-14

## 2022-04-14 RX ORDER — TAMSULOSIN HYDROCHLORIDE 0.4 MG/1
0.4 CAPSULE ORAL AT BEDTIME
Refills: 0 | Status: DISCONTINUED | OUTPATIENT
Start: 2022-04-14 | End: 2022-04-23

## 2022-04-14 RX ORDER — SENNA PLUS 8.6 MG/1
2 TABLET ORAL AT BEDTIME
Refills: 0 | Status: DISCONTINUED | OUTPATIENT
Start: 2022-04-14 | End: 2022-04-23

## 2022-04-14 RX ORDER — PANTOPRAZOLE SODIUM 20 MG/1
40 TABLET, DELAYED RELEASE ORAL
Refills: 0 | Status: DISCONTINUED | OUTPATIENT
Start: 2022-04-14 | End: 2022-04-19

## 2022-04-14 RX ORDER — PANTOPRAZOLE SODIUM 20 MG/1
40 TABLET, DELAYED RELEASE ORAL
Refills: 0 | Status: DISCONTINUED | OUTPATIENT
Start: 2022-04-14 | End: 2022-04-14

## 2022-04-14 RX ORDER — LIPASE/PROTEASE/AMYLASE 16-48-48K
2 CAPSULE,DELAYED RELEASE (ENTERIC COATED) ORAL
Refills: 0 | Status: DISCONTINUED | OUTPATIENT
Start: 2022-04-14 | End: 2022-04-23

## 2022-04-14 RX ORDER — SUCRALFATE 1 G
1 TABLET ORAL EVERY 6 HOURS
Refills: 0 | Status: DISCONTINUED | OUTPATIENT
Start: 2022-04-14 | End: 2022-04-23

## 2022-04-14 RX ORDER — FOLIC ACID 0.8 MG
1 TABLET ORAL DAILY
Refills: 0 | Status: DISCONTINUED | OUTPATIENT
Start: 2022-04-14 | End: 2022-04-23

## 2022-04-14 RX ORDER — METOCLOPRAMIDE HCL 10 MG
10 TABLET ORAL
Refills: 0 | Status: DISCONTINUED | OUTPATIENT
Start: 2022-04-14 | End: 2022-04-16

## 2022-04-14 RX ORDER — POTASSIUM PHOSPHATE, MONOBASIC POTASSIUM PHOSPHATE, DIBASIC 236; 224 MG/ML; MG/ML
15 INJECTION, SOLUTION INTRAVENOUS ONCE
Refills: 0 | Status: COMPLETED | OUTPATIENT
Start: 2022-04-14 | End: 2022-04-14

## 2022-04-14 RX ORDER — POLYETHYLENE GLYCOL 3350 17 G/17G
17 POWDER, FOR SOLUTION ORAL DAILY
Refills: 0 | Status: DISCONTINUED | OUTPATIENT
Start: 2022-04-14 | End: 2022-04-16

## 2022-04-14 RX ADMIN — Medication 1 GRAM(S): at 12:19

## 2022-04-14 RX ADMIN — Medication 10 MILLIGRAM(S): at 12:35

## 2022-04-14 RX ADMIN — POTASSIUM PHOSPHATE, MONOBASIC POTASSIUM PHOSPHATE, DIBASIC 62.5 MILLIMOLE(S): 236; 224 INJECTION, SOLUTION INTRAVENOUS at 03:17

## 2022-04-14 RX ADMIN — PANTOPRAZOLE SODIUM 40 MILLIGRAM(S): 20 TABLET, DELAYED RELEASE ORAL at 05:08

## 2022-04-14 RX ADMIN — TAMSULOSIN HYDROCHLORIDE 0.4 MILLIGRAM(S): 0.4 CAPSULE ORAL at 23:17

## 2022-04-14 RX ADMIN — PANTOPRAZOLE SODIUM 40 MILLIGRAM(S): 20 TABLET, DELAYED RELEASE ORAL at 17:49

## 2022-04-14 RX ADMIN — MORPHINE SULFATE 2 MILLIGRAM(S): 50 CAPSULE, EXTENDED RELEASE ORAL at 00:59

## 2022-04-14 RX ADMIN — Medication 1 TABLET(S): at 12:19

## 2022-04-14 RX ADMIN — Medication 10 MILLIGRAM(S): at 05:08

## 2022-04-14 RX ADMIN — GABAPENTIN 200 MILLIGRAM(S): 400 CAPSULE ORAL at 23:17

## 2022-04-14 RX ADMIN — Medication 100 GRAM(S): at 12:20

## 2022-04-14 RX ADMIN — HYDROMORPHONE HYDROCHLORIDE 0.25 MILLIGRAM(S): 2 INJECTION INTRAMUSCULAR; INTRAVENOUS; SUBCUTANEOUS at 09:07

## 2022-04-14 RX ADMIN — Medication 100 MILLIGRAM(S): at 12:19

## 2022-04-14 RX ADMIN — Medication 1 GRAM(S): at 23:18

## 2022-04-14 RX ADMIN — HYDROMORPHONE HYDROCHLORIDE 0.5 MILLIGRAM(S): 2 INJECTION INTRAMUSCULAR; INTRAVENOUS; SUBCUTANEOUS at 14:09

## 2022-04-14 RX ADMIN — Medication 1 GRAM(S): at 05:08

## 2022-04-14 RX ADMIN — Medication 2 CAPSULE(S): at 17:49

## 2022-04-14 RX ADMIN — Medication 10 MILLIGRAM(S): at 17:21

## 2022-04-14 RX ADMIN — Medication 100 GRAM(S): at 02:09

## 2022-04-14 RX ADMIN — POLYETHYLENE GLYCOL 3350 17 GRAM(S): 17 POWDER, FOR SOLUTION ORAL at 17:16

## 2022-04-14 RX ADMIN — Medication 10 MILLIGRAM(S): at 23:17

## 2022-04-14 RX ADMIN — MORPHINE SULFATE 2 MILLIGRAM(S): 50 CAPSULE, EXTENDED RELEASE ORAL at 01:14

## 2022-04-14 RX ADMIN — HYDROMORPHONE HYDROCHLORIDE 0.5 MILLIGRAM(S): 2 INJECTION INTRAMUSCULAR; INTRAVENOUS; SUBCUTANEOUS at 13:54

## 2022-04-14 RX ADMIN — HEPARIN SODIUM 5000 UNIT(S): 5000 INJECTION INTRAVENOUS; SUBCUTANEOUS at 23:18

## 2022-04-14 RX ADMIN — Medication 1 GRAM(S): at 17:21

## 2022-04-14 RX ADMIN — SENNA PLUS 2 TABLET(S): 8.6 TABLET ORAL at 23:18

## 2022-04-14 RX ADMIN — Medication 1 MILLIGRAM(S): at 12:19

## 2022-04-14 RX ADMIN — SODIUM CHLORIDE 1000 MILLILITER(S): 9 INJECTION, SOLUTION INTRAVENOUS at 00:27

## 2022-04-14 RX ADMIN — PIPERACILLIN AND TAZOBACTAM 200 GRAM(S): 4; .5 INJECTION, POWDER, LYOPHILIZED, FOR SOLUTION INTRAVENOUS at 00:00

## 2022-04-14 RX ADMIN — HYDROMORPHONE HYDROCHLORIDE 0.25 MILLIGRAM(S): 2 INJECTION INTRAMUSCULAR; INTRAVENOUS; SUBCUTANEOUS at 08:52

## 2022-04-14 RX ADMIN — HEPARIN SODIUM 5000 UNIT(S): 5000 INJECTION INTRAVENOUS; SUBCUTANEOUS at 17:50

## 2022-04-14 NOTE — PROGRESS NOTE ADULT - PROBLEM SELECTOR PLAN 5
Patient with reported weight loss, thin. Hypoalbuminemic at 2.5.  -Nutrition consult  -Nepro supplementation after NPO Hx of esophagitis, with biopsies from 4/8 EGD concerning for possible pill esophagitis.  -Liquid suspension medications when available  -Switch PPI suspension BID to PPI IV BID   -C/w carafate

## 2022-04-14 NOTE — PROGRESS NOTE ADULT - PROBLEM SELECTOR PLAN 2
Patient initially presented with YUNIEL on CKD. Improved with IVF. Now at serum Cr baseline.  -Strict I/Os  -Avoid nephrotoxins  -Dose meds per eGFR Patient found to have esophageal stricture 35cm from incisure, unable to pass EGD scope on 4/8. Transferred to [facility] for advanced GI eval.  -per advanced GI, f/u infectious workup prior to intervention   -Aspiration precautions, head of bed elevation  -patient refused pureed diet,   -IV dilaudid 0.25mg PRN for moderate pain, 0.5mg PRN for severe pain, patient endorses temporary relief with 0.25mg dilaudid. Patient with large stool burden, unable to pass stool without manual extraction  -c/w senna, Miralax, dulcolax suppository, trial tap water enema today

## 2022-04-14 NOTE — PHARMACOTHERAPY INTERVENTION NOTE - COMMENTS
Educated patient on indications, how and when to take the medications, and side effects. Pt verbalized understanding.

## 2022-04-14 NOTE — PROGRESS NOTE ADULT - PROBLEM SELECTOR PLAN 8
DVT ppx: Started heparin subQ in case of underlying malignancy, SCDs if GI planning procedure   Diet: NPO pending GI procedure  Dispo: pending clinical course

## 2022-04-14 NOTE — H&P ADULT - HISTORY OF PRESENT ILLNESS
62 yo M with hx of remote ETOH use, chronic pancreatitis, esophagitis, HCV, CKD4, initially presented to [facility] with abdominal pain. Underwent EGD 4/8 with findings of esophageal structure at 35cm from incisors, with inability to pass scope. He was transferred to [facility] for advanced gastroenterology evaluation. Patient seen and examined at bedside reports diffuse epigastric abdominal pain with 1 episode of nausea and emesis after taking 'yellow pill', per chart review appears to be 4mg dilaudid. Patient reports chronic hiccups for 'years' and recent weight loss reporting baseline weight ~215 pounds, weight on admission 126.3#. On arrival patient was febrile and tachycardic without acute complaints from baseline chronic issues.    ED Course:  Tm 39.3, ->71, /54, SpO2 98% on RA  Febrile and tachycardic on arrival to [facility]. Cultured and given 1g tylenol, 1 dose zosyn. CXR without focal consolidation.

## 2022-04-14 NOTE — PROGRESS NOTE ADULT - ASSESSMENT
62 yo M with hx of remote ETOH use, chronic pancreatitis, esophagitis, HCV, CKD4, initially presented to [facility] with abdominal pain found to have esophageal stricture on EGD transferred to [facility] for advanced GI evaluation. 62 yo M with hx of remote ETOH use, chronic pancreatitis, esophagitis, HCV s/p treatment, CKD4, initially presented to [facility] with abdominal pain found to have esophageal stricture on EGD transferred to [facility] for advanced GI evaluation.

## 2022-04-14 NOTE — CONSULT NOTE ADULT - SUBJECTIVE AND OBJECTIVE BOX
Chief Complaint:  Patient is a 61y old  Male who presents with a chief complaint of Esophageal stricture (14 Apr 2022 07:58)      HPI:IMELDA ROBERTSON is a 61y Male with hx of chronic pancreatitis due to EtOH (quit many years ago), HCV, CKD, grade D esophagitis on EGD 1/2022, presenting w/ abd pain. Patient presented to [facility] with abd pain. EGD showed esophageal stricture - scope could not traverse. He also reports some nausea/vomiting and difficulty tolerating food, which has been ongoing for months and is getting worse. Pain is for many years and is stable. Not related to eating, is intermittent and can be severe.     EGD 1/2022 showed grade D esophagitis w/o stricturing. Did not follow up for recommended repeat EGD.    Patient is febrile on transfer to [facility]. Denies any other symptoms.     PMHX/PSHX:  ETOH abuse    Pancreatitis    Hepatitis C    Gout    No significant past surgical history    Gastric perforation      Allergies:  No Known Allergies      Home Medications: reviewed  Hospital Medications:  folic acid 1 milliGRAM(s) Oral daily  gabapentin 200 milliGRAM(s) Oral at bedtime  HYDROmorphone  Injectable 0.25 milliGRAM(s) IV Push every 6 hours PRN  magnesium sulfate  IVPB 1 Gram(s) IV Intermittent once  melatonin 3 milliGRAM(s) Oral at bedtime PRN  metoclopramide 10 milliGRAM(s) Oral four times a day  multivitamin 1 Tablet(s) Oral daily  pancrelipase  (CREON  6,000 Lipase Units) 2 Capsule(s) Oral three times a day with meals  pantoprazole   Suspension 40 milliGRAM(s) Oral two times a day  senna 2 Tablet(s) Oral at bedtime  sucralfate 1 Gram(s) Oral every 6 hours  tamsulosin 0.4 milliGRAM(s) Oral at bedtime  thiamine 100 milliGRAM(s) Oral daily      Social History:   Tob: Denies  EtOH: Daily drinker many years ago.  Illicit Drugs: Denies    Family history:  No pertinent family history in first degree relatives    FH: HTN (hypertension)      Denies family history of colon cancer/polyps, stomach cancer/polyps, pancreatic cancer/masses, liver cancer/disease, ovarian cancer and endometrial cancer.    ROS:   [x] 14 point ROS negative other than as above  [ ] Patient unable to provide    PHYSICAL EXAM:   Vital Signs:  Vital Signs Last 24 Hrs  T(C): 37.2 (14 Apr 2022 05:10), Max: 39.3 (13 Apr 2022 21:37)  T(F): 99 (14 Apr 2022 05:10), Max: 102.7 (13 Apr 2022 21:37)  HR: 71 (14 Apr 2022 08:48) (71 - 110)  BP: 127/76 (14 Apr 2022 08:48) (111/54 - 194/94)  BP(mean): --  RR: 20 (14 Apr 2022 08:48) (18 - 20)  SpO2: 100% (14 Apr 2022 05:10) (97% - 100%)  Daily     Daily     GENERAL: no acute distress  NEURO: alert  HEENT: anicteric sclera  CHEST: no respiratory distress, no accessory muscle use  ABDOMEN: soft, TTP to light touch, no rebound/guarding  EXTREMITIES: warm, well perfused, no edema  SKIN: no jaundice    LABS: reviewed                        9.3    8.20  )-----------( 136      ( 14 Apr 2022 07:20 )             29.0     04-14    137  |  108<H>  |  27<H>  ----------------------------<  174<H>  3.8   |  18<L>  |  2.62<H>    Ca    8.2<L>      14 Apr 2022 07:20  Phos  3.7     04-14  Mg     1.90     04-14    TPro  4.8<L>  /  Alb  2.5<L>  /  TBili  <0.2  /  DBili  x   /  AST  14  /  ALT  10  /  AlkPhos  84  04-14    LIVER FUNCTIONS - ( 14 Apr 2022 07:20 )  Alb: 2.5 g/dL / Pro: 4.8 g/dL / ALK PHOS: 84 U/L / ALT: 10 U/L / AST: 14 U/L / GGT: x               Diagnostic Studies: see sunrise for full report

## 2022-04-14 NOTE — PROGRESS NOTE ADULT - PROBLEM SELECTOR PLAN 7
DVT ppx: SCDs pending GI procedure  Diet: NPO pending GI procedure  Dispo: pending clinical course Hx of pancreatitis with CT evidence of chronic pancreatitis.  -C/w CREON

## 2022-04-14 NOTE — PROGRESS NOTE ADULT - PROBLEM SELECTOR PLAN 3
Patient with fever on admission to 39.3 with tachycardia. Unclear infectious source, no leukocytosis. S/p 1 dose of zosyn  -F/u blood cx x2  -F/u UA/urine culture  -AP CXR without focal consolidation  -Monitor off further antibiotics unless fever recurs Patient initially presented with YUNIEL on CKD. Improved with IVF. Now at serum Cr baseline.  -Strict I/Os  -Avoid nephrotoxins  -Dose meds per eGFR Patient found to have esophageal stricture 35cm from incisure, unable to pass EGD scope on 4/8. Transferred to [facility] for advanced GI eval.  -per advanced GI, f/u infectious workup prior to intervention   -Aspiration precautions, head of bed elevation  -patient refused pureed diet, states he has been tolerating regular texture diet; does not feel food gets stuck in throat, counseled on risk of aspiration, patient amenable to soft and bite sized diet   -IV dilaudid 0.25mg PRN for moderate pain, 0.5mg PRN for severe pain, patient endorses temporary relief with 0.25mg dilaudid.  -concern for extrinsic compression given CT chest in December 2021 with fullness around esophogeal junction, per GI now attributing compression to large stool burden? Follow GI recs, so far not recommending chest imaging; iso CKD, hold off IV contrast for now

## 2022-04-14 NOTE — H&P ADULT - PROBLEM SELECTOR PLAN 3
Patient with fever on admission to 39.3 with tachycardia. Unclear infectious source, no leukocytosis. S/p 1 dose of zosyn  -F/u blood cx x2  -F/u UA/urine culture  -AP CXR without focal consolidation  -Monitor off further antibiotics unless fever recurs

## 2022-04-14 NOTE — H&P ADULT - NSHPLABSRESULTS_GEN_ALL_CORE
Personally reviewed labs, imaging, ekg                         10.0   8.03  )-----------( 144      ( 14 Apr 2022 00:18 )             32.0     137  |  106  |  29<H>  ----------------------------<  112<H>  3.5   |  20<L>  |  2.70<H>    Ca    8.3<L>      14 Apr 2022 00:18  Phos  2.2     04-14  Mg     1.60     04-14    TPro  5.8<L>  /  Alb  2.5<L>  /  TBili  0.2  /  DBili  x   /  AST  15  /  ALT  13  /  AlkPhos  73  04-13                      EKG: Personally reviewed labs, imaging, ekg                         10.0   8.03  )-----------( 144      ( 14 Apr 2022 00:18 )             32.0     137  |  106  |  29<H>  ----------------------------<  112<H>  3.5   |  20<L>  |  2.70<H>    Ca    8.3<L>      14 Apr 2022 00:18  Phos  2.2     04-14  Mg     1.60     04-14    TPro  5.8<L>  /  Alb  2.5<L>  /  TBili  0.2  /  DBili  x   /  AST  15  /  ALT  13  /  AlkPhos  73  04-13          EKG 4/6 review: Sinus rhythm, qtc 467    < from: CT Abdomen and Pelvis No Cont (04.06.22 @ 13:33) >    BLADDER: Minimally distended.  REPRODUCTIVE ORGANS: Prostate is mildly enlarged.    BOWEL: No bowel obstruction. Appendix isnot visualized. No evidence of   inflammation in the pericecal region. Rectum distended by stool.  PERITONEUM: No ascites.  VESSELS: Atherosclerotic calcifications.  RETROPERITONEUM/LYMPH NODES: No lymphadenopathy.  ABDOMINAL WALL: Postsurgical changes.  BONES: Degenerative changes. Left sacroiliac osseous fusion. Stable left   iliac lytic lesion.    IMPRESSION:  Findings of chronic pancreatitis.  Etiology of abdominal pain is otherwise not elucidated.    < end of copied text >

## 2022-04-14 NOTE — H&P ADULT - ASSESSMENT
62 yo M with hx of remote ETOH use, chronic pancreatitis, esophagitis, HCV, CKD4, initially presented to [facility] with abdominal pain found to have esophageal stricture on EGD transferred to [facility] for advanced GI evaluation.

## 2022-04-14 NOTE — PATIENT PROFILE ADULT - FALL HARM RISK - HARM RISK INTERVENTIONS

## 2022-04-14 NOTE — PROGRESS NOTE ADULT - PROBLEM SELECTOR PLAN 1
Patient found to have esophageal stricture 35cm from incisure, unable to pass EGD scope on 4/8. Transferred to [facility] for advanced GI eval.  -NPO  -Advanced GI emailed  -Aspiration precautions, head of bed elevation  -IV dilaudid 0.25mg PRN severe pain Patient found to have esophageal stricture 35cm from incisure, unable to pass EGD scope on 4/8. Transferred to [facility] for advanced GI eval.  -per advanced GI, f/u infectious workup prior to intervention   -Aspiration precautions, head of bed elevation  -patient refused pureed diet,   -IV dilaudid 0.25mg PRN severe pain Patient with fever on admission to 39.3 with tachycardia. Unclear infectious source, no leukocytosis. S/p 1 dose of zosyn. Consider tumor fever,.   -F/u blood cx x2  -F/u UA/urine culture  -AP CXR without focal consolidation  -Monitor off further antibiotics unless fever recurs

## 2022-04-14 NOTE — H&P ADULT - PROBLEM SELECTOR PLAN 2
Patient initially presented with YUNIEL on CKD. Improved with IVF. Now at serum Cr baseline.  -Strict I/Os  -Avoid nephrotoxins  -Dose meds per eGFR

## 2022-04-14 NOTE — H&P ADULT - ATTENDING COMMENTS
61 yr old male with a pmh of chronic pancreatitis, hx of alcohol misuse, esophagitis, HCV, CKD4 who is transferred from [facility] for GI evaluation of an esophageal stricture. Detailed history as above  Pt reports generalized chest and abdominal pain with nausea and one episode of vomiting. When asked to describe it states “it hurts”. On examination pt seen to have hiccups which he reports has been going on “ for years”. He also reports bringing on urination that has been going on “for years”  Denies  headache, dizziness,  palpitations, SOB,  joint pain, diarrhea/constipation   Vitals on admission: T 102.7, , /57, RR 18 satting 97% RA  CXR interpreted by myself: clear lungs    REVIEW OF SYSTEMS:    CONSTITUTIONAL: No weakness, fevers or chills  EYES/ENT: No visual changes;  No dysphagia; No sore throat; No rhinorrhea; No sinus pain/pressure  NECK: No pain or stiffness  RESPIRATORY: No cough, wheezing, hemoptysis; No shortness of breath  CARDIOVASCULAR: + chest pain no palpitations; No lower extremity edema  GASTROINTESTINAL: No abdominal or epigastric pain. No nausea, vomiting, or hematemesis; No diarrhea or constipation. No melena or hematochezia.  GENITOURINARY: No dysuria, frequency or hematuria  NEUROLOGICAL: No numbness or weakness  MSK: ambulates with a walker  SKIN: No itching, burning, rashes, or lesions   All other review of systems is negative unless indicated above.    PHYSICAL EXAM:  GENERAL: thin frail male   HEAD:  Atraumatic, Normocephalic  EYES: EOMI, PERRL, conjunctiva and sclera clear  NECK: Supple, No JVD  CHEST/LUNG: Clear to auscultation bilaterally; No wheezes, rales or rhonchi + bilateral costochondral tenderness  HEART: Regular rate and rhythm; No murmurs, rubs, or gallops, (+)S1, S2  ABDOMEN: Soft, generalized tenderness, Nondistended; Normal Bowel sounds   EXTREMITIES:  1+ Peripheral Pulses, No clubbing, cyanosis, or edema  PSYCH: normal mood and affect  NEUROLOGY: AAOx3, non-focal  SKIN: No rashes or lesions    Esophageal stricture  New  esophageal stricture 35cm from incisure, unable to pass EGD scope on 4/8- transferred to [facility] for advanced GI evaluation   NPO   Advanced GI notified  IV dilaudid 0.25mg PRN severe pain.    CKD4  Chronic now stable  Initially presented with elevated Cr which is now at baseline  Monitor     Fever  New  Tmax 102.7 on presentation  CXR clear  UA pending   Bcx x2 pending  Monitor off abx until UA returns as currently no source     Protein calorie malnutrition  Unstable with reported weight loss  Nutritional consult   Nepro supplement once no longer NPO    Chronic pancreatitis  Chronic stable  Continue with creon    Remainder as above

## 2022-04-14 NOTE — PROGRESS NOTE ADULT - PROBLEM SELECTOR PLAN 6
Hx of pancreatitis with CT evidence of chronic pancreatitis.  -C/w CREON Patient with reported weight loss, thin. Hypoalbuminemic at 2.5.  -Nutrition consult  -Nepro supplementation after NPO

## 2022-04-14 NOTE — CONSULT NOTE ADULT - ATTENDING COMMENTS
Patient with an esophageal stricture.  Transferred from [facility] for esophageal dilation.      Pt febrile to 103.  Plan for EGD when afebrile, and fever workup negative.

## 2022-04-14 NOTE — PROGRESS NOTE ADULT - SUBJECTIVE AND OBJECTIVE BOX
Authored by Neris Galaviz MD, PGY2  PATIENT:  IMELDA ROBERTSON  [id number]    CHIEF COMPLAINT:  Patient is a 61y old  Male who presents with a chief complaint of Esophageal stricture (14 Apr 2022 01:56)      INTERVAL HISTORY OVERNIGHT EVENTS: Patient transferred from [place] overnight for advanced GI eval for esophageal stricture.         MEDICATIONS:  MEDICATIONS  (STANDING):  folic acid 1 milliGRAM(s) Oral daily  gabapentin 200 milliGRAM(s) Oral at bedtime  metoclopramide 10 milliGRAM(s) Oral four times a day  multivitamin 1 Tablet(s) Oral daily  pancrelipase  (CREON  6,000 Lipase Units) 2 Capsule(s) Oral three times a day with meals  pantoprazole   Suspension 40 milliGRAM(s) Oral two times a day  senna 2 Tablet(s) Oral at bedtime  sucralfate 1 Gram(s) Oral every 6 hours  tamsulosin 0.4 milliGRAM(s) Oral at bedtime  thiamine 100 milliGRAM(s) Oral daily    MEDICATIONS  (PRN):  HYDROmorphone  Injectable 0.25 milliGRAM(s) IV Push every 6 hours PRN Severe Pain (7 - 10)  melatonin 3 milliGRAM(s) Oral at bedtime PRN Insomnia      ALLERGIES:  Allergies    No Known Allergies    Intolerances        OBJECTIVE:  ICU Vital Signs Last 24 Hrs  T(C): 37.2 (14 Apr 2022 05:10), Max: 39.3 (13 Apr 2022 21:37)  T(F): 99 (14 Apr 2022 05:10), Max: 102.7 (13 Apr 2022 21:37)  HR: 71 (14 Apr 2022 05:10) (71 - 110)  BP: 111/54 (14 Apr 2022 05:10) (111/54 - 194/94)  BP(mean): --  ABP: --  ABP(mean): --  RR: 18 (14 Apr 2022 05:10) (18 - 19)  SpO2: 100% (14 Apr 2022 05:10) (97% - 100%)          PHYSICAL EXAMINATION:  GENERAL: Frail thin man lying in bed  HEAD:  Atraumatic, Normocephalic  EYES: EOMI, PERRLA, conjunctiva and sclera clear  ENT: Moist mucous membranes  NECK: Supple, No JVD  CHEST/LUNG: Clear to auscultation bilaterally; No rales, rhonchi, wheezing, or rubs. Unlabored respirations  HEART: Regular rate and rhythm; No murmurs, rubs, or gallops  ABDOMEN: BSx4; Soft, tender to palpation in epigastrium, no guarding, hiccuping  EXTREMITIES:  1+ Peripheral Pulses, brisk capillary refill. long toenails  NERVOUS SYSTEM:  A&Ox3, no focal deficits   SKIN: No rashes or lesions        LABS:                          9.3    8.20  )-----------( 136      ( 14 Apr 2022 07:20 )             29.0     04-14    137  |  106  |  29<H>  ----------------------------<  112<H>  3.5   |  20<L>  |  2.70<H>    Ca    8.3<L>      14 Apr 2022 00:18  Phos  2.2     04-14  Mg     1.60     04-14    TPro  5.8<L>  /  Alb  2.5<L>  /  TBili  0.2  /  DBili  x   /  AST  15  /  ALT  13  /  AlkPhos  73  04-13    LIVER FUNCTIONS - ( 13 Apr 2022 06:39 )  Alb: 2.5 g/dL / Pro: 5.8 gm/dL / ALK PHOS: 73 U/L / ALT: 13 U/L / AST: 15 U/L / GGT: x           PT/INR - ( 14 Apr 2022 07:20 )   PT: 12.5 sec;   INR: 1.08 ratio         PTT - ( 14 Apr 2022 07:20 )  PTT:29.3 sec            TELEMETRY:     EKG:     IMAGING:    < from: CT Abdomen and Pelvis No Cont (04.06.22 @ 13:33) >  BOWEL: No bowel obstruction. Appendix isnot visualized. No evidence of   inflammation in the pericecal region. Rectum distended by stool.  PERITONEUM: No ascites.  VESSELS: Atherosclerotic calcifications.  RETROPERITONEUM/LYMPH NODES: No lymphadenopathy.  ABDOMINAL WALL: Postsurgical changes.  BONES: Degenerative changes. Left sacroiliac osseous fusion. Stable left   iliac lytic lesion.    IMPRESSION:  Findings of chronic pancreatitis.  Etiology of abdominal pain is otherwise not elucidated.        --- End of Report ---    < end of copied text >  < from: Xray Chest 1 View- PORTABLE-Urgent (04.06.22 @ 10:42) >    IMPRESSION: No evidence for focal infiltrate or lobar consolidation.    --- End of Report ---    < end of copied text >     Authored by Neris Galaviz MD, PGY2  PATIENT:  IMELDA ROBERTSON  [id number]    CHIEF COMPLAINT:  Patient is a 61y old  Male who presents with a chief complaint of Esophageal stricture (14 Apr 2022 01:56)      INTERVAL HISTORY OVERNIGHT EVENTS: Patient transferred from [place] overnight for advanced GI eval for esophageal stricture.         MEDICATIONS:  MEDICATIONS  (STANDING):  folic acid 1 milliGRAM(s) Oral daily  gabapentin 200 milliGRAM(s) Oral at bedtime  metoclopramide 10 milliGRAM(s) Oral four times a day  multivitamin 1 Tablet(s) Oral daily  pancrelipase  (CREON  6,000 Lipase Units) 2 Capsule(s) Oral three times a day with meals  pantoprazole   Suspension 40 milliGRAM(s) Oral two times a day  senna 2 Tablet(s) Oral at bedtime  sucralfate 1 Gram(s) Oral every 6 hours  tamsulosin 0.4 milliGRAM(s) Oral at bedtime  thiamine 100 milliGRAM(s) Oral daily    MEDICATIONS  (PRN):  HYDROmorphone  Injectable 0.25 milliGRAM(s) IV Push every 6 hours PRN Severe Pain (7 - 10)  melatonin 3 milliGRAM(s) Oral at bedtime PRN Insomnia      ALLERGIES:  Allergies    No Known Allergies    Intolerances        OBJECTIVE:  ICU Vital Signs Last 24 Hrs  T(C): 37.2 (14 Apr 2022 05:10), Max: 39.3 (13 Apr 2022 21:37)  T(F): 99 (14 Apr 2022 05:10), Max: 102.7 (13 Apr 2022 21:37)  HR: 71 (14 Apr 2022 05:10) (71 - 110)  BP: 111/54 (14 Apr 2022 05:10) (111/54 - 194/94)  BP(mean): --  ABP: --  ABP(mean): --  RR: 18 (14 Apr 2022 05:10) (18 - 19)  SpO2: 100% (14 Apr 2022 05:10) (97% - 100%)          PHYSICAL EXAMINATION:  GENERAL: Frail thin man lying in bed  HEAD:  Atraumatic, Normocephalic  EYES: EOMI, PERRLA, conjunctiva and sclera clear  ENT: Moist mucous membranes  NECK: Supple, No JVD  CHEST/LUNG: Clear to auscultation bilaterally; No rales, rhonchi, wheezing, or rubs. Unlabored respirations  HEART: Regular rate and rhythm; No murmurs, rubs, or gallops  ABDOMEN: BSx4; Soft, tender to palpation in epigastrium, no guarding, hiccuping  EXTREMITIES:  1+ Peripheral Pulses, brisk capillary refill. long toenails  NERVOUS SYSTEM:  A&Ox3, no focal deficits   SKIN: No rashes or lesions        LABS:                          9.3    8.20  )-----------( 136      ( 14 Apr 2022 07:20 )             29.0     04-14    137  |  106  |  29<H>  ----------------------------<  112<H>  3.5   |  20<L>  |  2.70<H>    Ca    8.3<L>      14 Apr 2022 00:18  Phos  2.2     04-14  Mg     1.60     04-14    TPro  5.8<L>  /  Alb  2.5<L>  /  TBili  0.2  /  DBili  x   /  AST  15  /  ALT  13  /  AlkPhos  73  04-13    LIVER FUNCTIONS - ( 13 Apr 2022 06:39 )  Alb: 2.5 g/dL / Pro: 5.8 gm/dL / ALK PHOS: 73 U/L / ALT: 13 U/L / AST: 15 U/L / GGT: x           PT/INR - ( 14 Apr 2022 07:20 )   PT: 12.5 sec;   INR: 1.08 ratio         PTT - ( 14 Apr 2022 07:20 )  PTT:29.3 sec            TELEMETRY:     EKG:     IMAGING:    < from: CT Abdomen and Pelvis No Cont (04.06.22 @ 13:33) >  BOWEL: No bowel obstruction. Appendix isnot visualized. No evidence of   inflammation in the pericecal region. Rectum distended by stool.  PERITONEUM: No ascites.  VESSELS: Atherosclerotic calcifications.  RETROPERITONEUM/LYMPH NODES: No lymphadenopathy.  ABDOMINAL WALL: Postsurgical changes.  BONES: Degenerative changes. Left sacroiliac osseous fusion. Stable left   iliac lytic lesion.    IMPRESSION:  Findings of chronic pancreatitis.  Etiology of abdominal pain is otherwise not elucidated.        --- End of Report ---    < end of copied text >  < from: Xray Chest 1 View- PORTABLE-Urgent (04.06.22 @ 10:42) >    IMPRESSION: No evidence for focal infiltrate or lobar consolidation.    --- End of Report ---    < end of copied text >        PROCEDURE DATE:  12/28/2021          INTERPRETATION:  CLINICAL INDICATION: Dysphagia, esophageal obstruction.    Axial CT images of the chest are obtained without intravenous   administration of contrast. Oral contrast was administered.    Mucosal thickening involving the partially imaged maxillary sinuses.    No enlarged axillary, mediastinal or hilar lymph nodes.    Heart size is normal. No pericardial effusion. Trace left pleural fluid.    Oral contrast within the esophagus to the level of the lower   esophagus/gastroesophageal junction where there is focal fullness   extending into the proximal stomach. Please note evaluation is limited   due to under distention of the stomach and the lower esophagus and lack   of intravenous contrast. Previously described wall thickening of the mid   to lower esophagus is again noted.    Evaluation of the upper abdomen demonstrate extensive pancreatic   calcifications suggestive of chronic pancreatitis. The pancreatic duct is   enlarged measuring up to about 1 cm although again evaluation is limited   due to lack of intravenous contrast. Oral contrast within loops of colon   from the prior esophagram.    Evaluation of the lungs demonstrate emphysema. Respiratory motion   artifact limits evaluation of the mid to lower lungs. Mild bilateral   lower lung areas of linear or subsegmental atelectasis. A few ill-defined   small right lung peripheral patchy groundglass nonspecific opacities   within all 3 lobes are new since the chest CT of December 20, 2021. Small   cluster of a few 2 or 3 mm tree-in-bud opacities within the right upper   and right lower lobes are suggestive of foci of impacted distal airways.   Trace secretion within the trachea extending into the right mainstem   bronchus.    No significant bony abnormality.    IMPRESSION: Focal fullness involving the lower esophagus and the proximal   stomach may be due to a mass given the constellation of findings although   evaluation is limited due to lack of intravenous contrast and under   distention of the lower esophagus and stomach. Contrast-enhanced CT can   be performed for further evaluation.    Mid to lower wall esophageal wall thickening.    Oral contrast within loops of colon from the prior esophagram.    A few nonspecific right lung peripheral patchy groundglass opacities new   since December 20, 2021 may be of infectious/inflammatory etiology.    Diffuse pancreatic calcifications suggestive of chronic pancreatitis.   Enlargement of the pancreatic duct is not well evaluated on this   noncontrast chest CT. Dedicated contrast enhanced pancreatic CT or MRI   can be performed for further evaluation.   Authored by Neris Galaviz MD, PGY2  PATIENT:  IMELDA ROBERTSON  [id number]    CHIEF COMPLAINT:  Patient is a 61y old  Male who presents with a chief complaint of Esophageal stricture (14 Apr 2022 01:56)      INTERVAL HISTORY OVERNIGHT EVENTS: Patient transferred from [place] overnight for advanced GI eval for esophageal stricture. Patient endorses continued hiccups and epigastric pain, at baseline. Patient did not feel warm or feverish, no cough. Chronically spits up white mucous. Had a hard BM today, has to manually extract stool.         MEDICATIONS:  MEDICATIONS  (STANDING):  folic acid 1 milliGRAM(s) Oral daily  gabapentin 200 milliGRAM(s) Oral at bedtime  metoclopramide 10 milliGRAM(s) Oral four times a day  multivitamin 1 Tablet(s) Oral daily  pancrelipase  (CREON  6,000 Lipase Units) 2 Capsule(s) Oral three times a day with meals  pantoprazole   Suspension 40 milliGRAM(s) Oral two times a day  senna 2 Tablet(s) Oral at bedtime  sucralfate 1 Gram(s) Oral every 6 hours  tamsulosin 0.4 milliGRAM(s) Oral at bedtime  thiamine 100 milliGRAM(s) Oral daily    MEDICATIONS  (PRN):  HYDROmorphone  Injectable 0.25 milliGRAM(s) IV Push every 6 hours PRN Severe Pain (7 - 10)  melatonin 3 milliGRAM(s) Oral at bedtime PRN Insomnia      ALLERGIES:  Allergies    No Known Allergies    Intolerances        OBJECTIVE:  ICU Vital Signs Last 24 Hrs  T(C): 37.2 (14 Apr 2022 05:10), Max: 39.3 (13 Apr 2022 21:37)  T(F): 99 (14 Apr 2022 05:10), Max: 102.7 (13 Apr 2022 21:37)  HR: 71 (14 Apr 2022 05:10) (71 - 110)  BP: 111/54 (14 Apr 2022 05:10) (111/54 - 194/94)  BP(mean): --  ABP: --  ABP(mean): --  RR: 18 (14 Apr 2022 05:10) (18 - 19)  SpO2: 100% (14 Apr 2022 05:10) (97% - 100%)          PHYSICAL EXAMINATION:  GENERAL: Frail thin man lying in bed  HEAD:  Atraumatic, Normocephalic  EYES: EOMI, PERRLA, conjunctiva and sclera clear  ENT: Moist mucous membranes  NECK: Supple, No JVD  CHEST/LUNG: Clear to auscultation bilaterally; No rales, rhonchi, wheezing, or rubs. Unlabored respirations  HEART: Regular rate and rhythm; No murmurs, rubs, or gallops  ABDOMEN: BSx4; Soft, tender to palpation in epigastrium, no guarding, hiccuping  EXTREMITIES:  1+ Peripheral Pulses, brisk capillary refill. long toenails  NERVOUS SYSTEM:  A&Ox3, no focal deficits   SKIN: No rashes or lesions        LABS:                          9.3    8.20  )-----------( 136      ( 14 Apr 2022 07:20 )             29.0     04-14    137  |  106  |  29<H>  ----------------------------<  112<H>  3.5   |  20<L>  |  2.70<H>    Ca    8.3<L>      14 Apr 2022 00:18  Phos  2.2     04-14  Mg     1.60     04-14    TPro  5.8<L>  /  Alb  2.5<L>  /  TBili  0.2  /  DBili  x   /  AST  15  /  ALT  13  /  AlkPhos  73  04-13    LIVER FUNCTIONS - ( 13 Apr 2022 06:39 )  Alb: 2.5 g/dL / Pro: 5.8 gm/dL / ALK PHOS: 73 U/L / ALT: 13 U/L / AST: 15 U/L / GGT: x           PT/INR - ( 14 Apr 2022 07:20 )   PT: 12.5 sec;   INR: 1.08 ratio         PTT - ( 14 Apr 2022 07:20 )  PTT:29.3 sec            TELEMETRY:     EKG:     IMAGING:    < from: CT Abdomen and Pelvis No Cont (04.06.22 @ 13:33) >  BOWEL: No bowel obstruction. Appendix isnot visualized. No evidence of   inflammation in the pericecal region. Rectum distended by stool.  PERITONEUM: No ascites.  VESSELS: Atherosclerotic calcifications.  RETROPERITONEUM/LYMPH NODES: No lymphadenopathy.  ABDOMINAL WALL: Postsurgical changes.  BONES: Degenerative changes. Left sacroiliac osseous fusion. Stable left   iliac lytic lesion.    IMPRESSION:  Findings of chronic pancreatitis.  Etiology of abdominal pain is otherwise not elucidated.        --- End of Report ---    < end of copied text >  < from: Xray Chest 1 View- PORTABLE-Urgent (04.06.22 @ 10:42) >    IMPRESSION: No evidence for focal infiltrate or lobar consolidation.    --- End of Report ---    < end of copied text >        PROCEDURE DATE:  12/28/2021          INTERPRETATION:  CLINICAL INDICATION: Dysphagia, esophageal obstruction.    Axial CT images of the chest are obtained without intravenous   administration of contrast. Oral contrast was administered.    Mucosal thickening involving the partially imaged maxillary sinuses.    No enlarged axillary, mediastinal or hilar lymph nodes.    Heart size is normal. No pericardial effusion. Trace left pleural fluid.    Oral contrast within the esophagus to the level of the lower   esophagus/gastroesophageal junction where there is focal fullness   extending into the proximal stomach. Please note evaluation is limited   due to under distention of the stomach and the lower esophagus and lack   of intravenous contrast. Previously described wall thickening of the mid   to lower esophagus is again noted.    Evaluation of the upper abdomen demonstrate extensive pancreatic   calcifications suggestive of chronic pancreatitis. The pancreatic duct is   enlarged measuring up to about 1 cm although again evaluation is limited   due to lack of intravenous contrast. Oral contrast within loops of colon   from the prior esophagram.    Evaluation of the lungs demonstrate emphysema. Respiratory motion   artifact limits evaluation of the mid to lower lungs. Mild bilateral   lower lung areas of linear or subsegmental atelectasis. A few ill-defined   small right lung peripheral patchy groundglass nonspecific opacities   within all 3 lobes are new since the chest CT of December 20, 2021. Small   cluster of a few 2 or 3 mm tree-in-bud opacities within the right upper   and right lower lobes are suggestive of foci of impacted distal airways.   Trace secretion within the trachea extending into the right mainstem   bronchus.    No significant bony abnormality.    IMPRESSION: Focal fullness involving the lower esophagus and the proximal   stomach may be due to a mass given the constellation of findings although   evaluation is limited due to lack of intravenous contrast and under   distention of the lower esophagus and stomach. Contrast-enhanced CT can   be performed for further evaluation.    Mid to lower wall esophageal wall thickening.    Oral contrast within loops of colon from the prior esophagram.    A few nonspecific right lung peripheral patchy groundglass opacities new   since December 20, 2021 may be of infectious/inflammatory etiology.    Diffuse pancreatic calcifications suggestive of chronic pancreatitis.   Enlargement of the pancreatic duct is not well evaluated on this   noncontrast chest CT. Dedicated contrast enhanced pancreatic CT or MRI   can be performed for further evaluation.

## 2022-04-14 NOTE — H&P ADULT - NSHPREVIEWOFSYSTEMS_GEN_ALL_CORE
REVIEW OF SYSTEMS:    CONSTITUTIONAL: No weakness, + fevers, +weight loss  EYES/ENT: No visual changes;  No vertigo or throat pain   NECK: No pain or stiffness  RESPIRATORY: No cough, wheezing, hemoptysis; No shortness of breath  CARDIOVASCULAR: No chest pain or palpitations  GASTROINTESTINAL: + abdominal pain diffuse, + hiccups, no diarrhea  GENITOURINARY: No dysuria, frequency or hematuria  NEUROLOGICAL: No numbness or weakness  SKIN: No itching, rashes

## 2022-04-14 NOTE — H&P ADULT - PROBLEM SELECTOR PLAN 4
Hx of esophagitis, with biopsies from 4/8 EGD concerning for possible pill esophagitis.  -Liquid suspension medications when available  -Continue PPI suspension BID  -C/w carafate

## 2022-04-14 NOTE — PROGRESS NOTE ADULT - PROBLEM SELECTOR PLAN 4
Hx of esophagitis, with biopsies from 4/8 EGD concerning for possible pill esophagitis.  -Liquid suspension medications when available  -Continue PPI suspension BID  -C/w carafate Patient initially presented with YUNIEL on CKD. Improved with IVF. Now at serum Cr baseline.  -Strict I/Os  -Avoid nephrotoxins  -Dose meds per eGFR

## 2022-04-14 NOTE — H&P ADULT - NSHPSOCIALHISTORY_GEN_ALL_CORE
Former tobacco and ETOH use, denies current use. Denies recreational drug use. Ambulates with walker.

## 2022-04-14 NOTE — CONSULT NOTE ADULT - ASSESSMENT
61M with history of chronic pancreatitis, HCV, CKD, hx of esophagitis, presenting w/ abd pain, transferred for evaluation of esophageal stricture.    Impression:  #Esophageal stricture w/ dysphagia - Likely benign peptic stricture given long-standing severe esophagitis.  #Abd pain - likely 2/2 chronic pancreatitis. Unrelated to stricture. Exam is notable for severe TTP to light skin touch. Possible neuropathic component.  #HCV s/p treatment (neg viral load)  #CKD  #Fever    Recommendations:  - Infectious work-up for fever.  - Pain control per primary team. Consider addition of gabapentin given c/f neuropathic component.  - Eventual EGD w/ possible dilation pending fever curve and infectious work-up.  - Diet as tolerated, would not advance beyond pureed given narrow stricture.    Advanced GI will continue to follow.     Recommendations preliminary until signed by attending.     Panda Clarke  Gastroenterology/Hepatology Fellow  Available via [employer] Teams    NON-URGENT CONSULTS:  Please email [email or web address]  AT NIGHT AND ON WEEKENDS:  Contact on-call GI fellow via answering service ([phone]) from 5pm-8am and on weekends/holidays  MONDAY-FRIDAY 8AM-5PM:  Pager# [phone] ([facility]) or [phone] ([facility])  GI Phone# [phone] ([facility])

## 2022-04-14 NOTE — H&P ADULT - NSHPPHYSICALEXAM_GEN_ALL_CORE
VITALS:   T(C): 37.6 (04-13-22 @ 23:54), Max: 39.3 (04-13-22 @ 21:37)  HR: 71 (04-14-22 @ 00:54) (71 - 110)  BP: 115/54 (04-14-22 @ 00:54) (115/54 - 194/94)  RR: 18 (04-14-22 @ 00:54) (18 - 19)  SpO2: 98% (04-14-22 @ 00:54) (97% - 99%)    GENERAL: NAD, lying in bed comfortably  HEAD:  Atraumatic, Normocephalic  EYES: EOMI, PERRLA, conjunctiva and sclera clear  ENT: Moist mucous membranes  NECK: Supple, No JVD  CHEST/LUNG: Clear to auscultation bilaterally; No rales, rhonchi, wheezing, or rubs. Unlabored respirations  HEART: Regular rate and rhythm; No murmurs, rubs, or gallops  ABDOMEN: BSx4; Soft, nontender, nondistended  EXTREMITIES:  2+ Peripheral Pulses, brisk capillary refill. No clubbing, cyanosis, or edema  NERVOUS SYSTEM:  A&Ox3, no focal deficits   SKIN: No rashes or lesions VITALS:   T(C): 37.6 (04-13-22 @ 23:54), Max: 39.3 (04-13-22 @ 21:37)  HR: 71 (04-14-22 @ 00:54) (71 - 110)  BP: 115/54 (04-14-22 @ 00:54) (115/54 - 194/94)  RR: 18 (04-14-22 @ 00:54) (18 - 19)  SpO2: 98% (04-14-22 @ 00:54) (97% - 99%)    GENERAL: Frail thin man lying in bed  HEAD:  Atraumatic, Normocephalic  EYES: EOMI, PERRLA, conjunctiva and sclera clear  ENT: Moist mucous membranes  NECK: Supple, No JVD  CHEST/LUNG: Clear to auscultation bilaterally; No rales, rhonchi, wheezing, or rubs. Unlabored respirations  HEART: Regular rate and rhythm; No murmurs, rubs, or gallops  ABDOMEN: BSx4; Soft, tender to palpation in epigastrium, no guarding, hiccuping  EXTREMITIES:  1+ Peripheral Pulses, brisk capillary refill. long toenails  NERVOUS SYSTEM:  A&Ox3, no focal deficits   SKIN: No rashes or lesions

## 2022-04-14 NOTE — H&P ADULT - PROBLEM SELECTOR PLAN 5
Patient with reported weight loss, thin. Hypoalbuminemic at 2.5.  -Nutrition consult  -Nepro supplementation after NPO

## 2022-04-14 NOTE — PROGRESS NOTE ADULT - ATTENDING COMMENTS
Patient seen and examined, care plan discussed with house staff as above.    61yoM w hx of chronic pancreatitis (2/2 ?remote etoh use), CKD Stage IV, HCV (s/p 8 weeks treatment, unclear when, unable to confirm), hx of gastric perf w peritonitis and MSSA bacteremia in 2019 requiring ex lap and prolonged hospitalization, 100 lb weight loss unintentionally, transferred from  with esophageal stricture w intractible hiccups and epigastric pain; in need of advanced GI specialist as scope could not be advanced in prior EGD at  4/6. Pt denies dysphagia. Found to be febrile on admission to [facility] of 102F. Infectious work up as above-- CXR without infiltrate, though patient is high risk for aspiration event, blood cultures, urine cultures pending, HIV neg 4/6, no evidence of necrotizing pancreatitis on CT (continues to show pancreatic ductal dilation). Review of prior CT ap, however patient has not had CT chest to eval esophagus/surrounding structures since Dec 2021 at which time he had CT w oral contrast (see above). Above findings are highly concerning for occult malignancy. Note lytic lesion of iliac crest L side on CT ap 4/6, stable from prior imaging.  Prior EGD with biopsy Jan 2022 showed gastritis/esophagitis, biopsy obtained at  4/6 w path concerning for pill esophagitis, H pylori neg on initial EGD bx in Dec 2021. CEA 4.5 in 2019. GI consulted. F/u CA-19-9 and CEA in am. Obtain baseline EKG. Cont IV PPI BID. Hold off on antibiotics pending work up as above for now. Low threshold to resume zosyn empirically if he fevers again and get further esophageal/abdominal imaging.

## 2022-04-14 NOTE — H&P ADULT - PROBLEM SELECTOR PLAN 1
Patient found to have esophageal stricture 35cm from incisure, unable to pass EGD scope on 4/8. Transferred to [facility] for advanced GI eval.  -NPO  -Advanced GI emailed  -Aspiration precautions, head of bed elevation  -IV dilaudid 0.25mg PRN severe pain

## 2022-04-15 DIAGNOSIS — R07.9 CHEST PAIN, UNSPECIFIED: ICD-10-CM

## 2022-04-15 LAB
ALBUMIN SERPL ELPH-MCNC: 2.5 G/DL — LOW (ref 3.3–5)
ALP SERPL-CCNC: 78 U/L — SIGNIFICANT CHANGE UP (ref 40–120)
ALT FLD-CCNC: 13 U/L — SIGNIFICANT CHANGE UP (ref 4–41)
ANION GAP SERPL CALC-SCNC: 14 MMOL/L — SIGNIFICANT CHANGE UP (ref 7–14)
APPEARANCE UR: CLEAR — SIGNIFICANT CHANGE UP
AST SERPL-CCNC: 12 U/L — SIGNIFICANT CHANGE UP (ref 4–40)
BASOPHILS # BLD AUTO: 0.02 K/UL — SIGNIFICANT CHANGE UP (ref 0–0.2)
BASOPHILS NFR BLD AUTO: 0.4 % — SIGNIFICANT CHANGE UP (ref 0–2)
BILIRUB SERPL-MCNC: <0.2 MG/DL — SIGNIFICANT CHANGE UP (ref 0.2–1.2)
BILIRUB UR-MCNC: NEGATIVE — SIGNIFICANT CHANGE UP
BUN SERPL-MCNC: 32 MG/DL — HIGH (ref 7–23)
CALCIUM SERPL-MCNC: 8.4 MG/DL — SIGNIFICANT CHANGE UP (ref 8.4–10.5)
CANCER AG19-9 SERPL-ACNC: 152 U/ML — HIGH
CEA SERPL-MCNC: 3.7 NG/ML — SIGNIFICANT CHANGE UP (ref 1–3.8)
CHLORIDE SERPL-SCNC: 108 MMOL/L — HIGH (ref 98–107)
CO2 SERPL-SCNC: 19 MMOL/L — LOW (ref 22–31)
COLOR SPEC: YELLOW — SIGNIFICANT CHANGE UP
CREAT SERPL-MCNC: 2.62 MG/DL — HIGH (ref 0.5–1.3)
CULTURE RESULTS: SIGNIFICANT CHANGE UP
DIFF PNL FLD: NEGATIVE — SIGNIFICANT CHANGE UP
EGFR: 27 ML/MIN/1.73M2 — LOW
EOSINOPHIL # BLD AUTO: 0.15 K/UL — SIGNIFICANT CHANGE UP (ref 0–0.5)
EOSINOPHIL NFR BLD AUTO: 3.2 % — SIGNIFICANT CHANGE UP (ref 0–6)
GLUCOSE SERPL-MCNC: 88 MG/DL — SIGNIFICANT CHANGE UP (ref 70–99)
GLUCOSE UR QL: NEGATIVE — SIGNIFICANT CHANGE UP
HCT VFR BLD CALC: 28.9 % — LOW (ref 39–50)
HCV RNA FLD QL NAA+PROBE: SIGNIFICANT CHANGE UP
HCV RNA SPEC NAA+PROBE-LOG IU: SIGNIFICANT CHANGE UP
HCV RNA SPEC NAA+PROBE-LOG IU: SIGNIFICANT CHANGE UP LOGIU/ML
HGB BLD-MCNC: 9.6 G/DL — LOW (ref 13–17)
IANC: 2.29 K/UL — SIGNIFICANT CHANGE UP (ref 1.8–7.4)
IMM GRANULOCYTES NFR BLD AUTO: 0.4 % — SIGNIFICANT CHANGE UP (ref 0–1.5)
KETONES UR-MCNC: NEGATIVE — SIGNIFICANT CHANGE UP
LEUKOCYTE ESTERASE UR-ACNC: NEGATIVE — SIGNIFICANT CHANGE UP
LYMPHOCYTES # BLD AUTO: 1.38 K/UL — SIGNIFICANT CHANGE UP (ref 1–3.3)
LYMPHOCYTES # BLD AUTO: 29.8 % — SIGNIFICANT CHANGE UP (ref 13–44)
MAGNESIUM SERPL-MCNC: 2.2 MG/DL — SIGNIFICANT CHANGE UP (ref 1.6–2.6)
MCHC RBC-ENTMCNC: 29.6 PG — SIGNIFICANT CHANGE UP (ref 27–34)
MCHC RBC-ENTMCNC: 33.2 GM/DL — SIGNIFICANT CHANGE UP (ref 32–36)
MCV RBC AUTO: 89.2 FL — SIGNIFICANT CHANGE UP (ref 80–100)
MONOCYTES # BLD AUTO: 0.77 K/UL — SIGNIFICANT CHANGE UP (ref 0–0.9)
MONOCYTES NFR BLD AUTO: 16.6 % — HIGH (ref 2–14)
MRSA PCR RESULT.: SIGNIFICANT CHANGE UP
NEUTROPHILS # BLD AUTO: 2.29 K/UL — SIGNIFICANT CHANGE UP (ref 1.8–7.4)
NEUTROPHILS NFR BLD AUTO: 49.6 % — SIGNIFICANT CHANGE UP (ref 43–77)
NITRITE UR-MCNC: NEGATIVE — SIGNIFICANT CHANGE UP
NRBC # BLD: 0 /100 WBCS — SIGNIFICANT CHANGE UP
NRBC # FLD: 0 K/UL — SIGNIFICANT CHANGE UP
PH UR: 6.5 — SIGNIFICANT CHANGE UP (ref 5–8)
PHOSPHATE SERPL-MCNC: 2.9 MG/DL — SIGNIFICANT CHANGE UP (ref 2.5–4.5)
PLATELET # BLD AUTO: 186 K/UL — SIGNIFICANT CHANGE UP (ref 150–400)
POTASSIUM SERPL-MCNC: 3.5 MMOL/L — SIGNIFICANT CHANGE UP (ref 3.5–5.3)
POTASSIUM SERPL-SCNC: 3.5 MMOL/L — SIGNIFICANT CHANGE UP (ref 3.5–5.3)
PROT SERPL-MCNC: 5.1 G/DL — LOW (ref 6–8.3)
PROT UR-MCNC: ABNORMAL
RBC # BLD: 3.24 M/UL — LOW (ref 4.2–5.8)
RBC # FLD: 18.1 % — HIGH (ref 10.3–14.5)
S AUREUS DNA NOSE QL NAA+PROBE: DETECTED
SODIUM SERPL-SCNC: 141 MMOL/L — SIGNIFICANT CHANGE UP (ref 135–145)
SP GR SPEC: 1.02 — SIGNIFICANT CHANGE UP (ref 1–1.05)
SPECIMEN SOURCE: SIGNIFICANT CHANGE UP
UROBILINOGEN FLD QL: SIGNIFICANT CHANGE UP
WBC # BLD: 4.63 K/UL — SIGNIFICANT CHANGE UP (ref 3.8–10.5)
WBC # FLD AUTO: 4.63 K/UL — SIGNIFICANT CHANGE UP (ref 3.8–10.5)

## 2022-04-15 PROCEDURE — 99232 SBSQ HOSP IP/OBS MODERATE 35: CPT | Mod: GC

## 2022-04-15 PROCEDURE — 99233 SBSQ HOSP IP/OBS HIGH 50: CPT

## 2022-04-15 RX ORDER — OXYCODONE HYDROCHLORIDE 5 MG/1
10 TABLET ORAL EVERY 4 HOURS
Refills: 0 | Status: DISCONTINUED | OUTPATIENT
Start: 2022-04-15 | End: 2022-04-21

## 2022-04-15 RX ORDER — SODIUM CHLORIDE 9 MG/ML
500 INJECTION, SOLUTION INTRAVENOUS
Refills: 0 | Status: COMPLETED | OUTPATIENT
Start: 2022-04-15 | End: 2023-01-01

## 2022-04-15 RX ORDER — SODIUM CHLORIDE 9 MG/ML
1000 INJECTION, SOLUTION INTRAVENOUS
Refills: 0 | Status: DISCONTINUED | OUTPATIENT
Start: 2022-04-15 | End: 2022-04-16

## 2022-04-15 RX ORDER — ACETAMINOPHEN 500 MG
650 TABLET ORAL EVERY 6 HOURS
Refills: 0 | Status: COMPLETED | OUTPATIENT
Start: 2022-04-15 | End: 2022-04-17

## 2022-04-15 RX ORDER — CYCLOBENZAPRINE HYDROCHLORIDE 10 MG/1
5 TABLET, FILM COATED ORAL THREE TIMES A DAY
Refills: 0 | Status: DISCONTINUED | OUTPATIENT
Start: 2022-04-15 | End: 2022-04-20

## 2022-04-15 RX ORDER — GABAPENTIN 400 MG/1
200 CAPSULE ORAL AT BEDTIME
Refills: 0 | Status: DISCONTINUED | OUTPATIENT
Start: 2022-04-15 | End: 2022-04-20

## 2022-04-15 RX ADMIN — Medication 1 GRAM(S): at 06:44

## 2022-04-15 RX ADMIN — SODIUM CHLORIDE 100 MILLILITER(S): 9 INJECTION, SOLUTION INTRAVENOUS at 12:09

## 2022-04-15 RX ADMIN — Medication 100 MILLIGRAM(S): at 12:12

## 2022-04-15 RX ADMIN — Medication 10 MILLIGRAM(S): at 12:12

## 2022-04-15 RX ADMIN — GABAPENTIN 200 MILLIGRAM(S): 400 CAPSULE ORAL at 21:49

## 2022-04-15 RX ADMIN — Medication 1 GRAM(S): at 18:33

## 2022-04-15 RX ADMIN — Medication 2 CAPSULE(S): at 12:13

## 2022-04-15 RX ADMIN — PANTOPRAZOLE SODIUM 40 MILLIGRAM(S): 20 TABLET, DELAYED RELEASE ORAL at 18:32

## 2022-04-15 RX ADMIN — HEPARIN SODIUM 5000 UNIT(S): 5000 INJECTION INTRAVENOUS; SUBCUTANEOUS at 06:44

## 2022-04-15 RX ADMIN — Medication 1 GRAM(S): at 12:13

## 2022-04-15 RX ADMIN — POLYETHYLENE GLYCOL 3350 17 GRAM(S): 17 POWDER, FOR SOLUTION ORAL at 12:13

## 2022-04-15 RX ADMIN — TAMSULOSIN HYDROCHLORIDE 0.4 MILLIGRAM(S): 0.4 CAPSULE ORAL at 21:06

## 2022-04-15 RX ADMIN — Medication 1 TABLET(S): at 12:12

## 2022-04-15 RX ADMIN — OXYCODONE HYDROCHLORIDE 10 MILLIGRAM(S): 5 TABLET ORAL at 19:15

## 2022-04-15 RX ADMIN — HEPARIN SODIUM 5000 UNIT(S): 5000 INJECTION INTRAVENOUS; SUBCUTANEOUS at 21:07

## 2022-04-15 RX ADMIN — Medication 2 CAPSULE(S): at 18:33

## 2022-04-15 RX ADMIN — Medication 10 MILLIGRAM(S): at 18:33

## 2022-04-15 RX ADMIN — Medication 10 MILLIGRAM(S): at 12:13

## 2022-04-15 RX ADMIN — Medication 650 MILLIGRAM(S): at 18:33

## 2022-04-15 RX ADMIN — PANTOPRAZOLE SODIUM 40 MILLIGRAM(S): 20 TABLET, DELAYED RELEASE ORAL at 06:41

## 2022-04-15 RX ADMIN — OXYCODONE HYDROCHLORIDE 10 MILLIGRAM(S): 5 TABLET ORAL at 18:33

## 2022-04-15 RX ADMIN — Medication 10 MILLIGRAM(S): at 06:44

## 2022-04-15 RX ADMIN — Medication 1 MILLIGRAM(S): at 12:12

## 2022-04-15 RX ADMIN — HEPARIN SODIUM 5000 UNIT(S): 5000 INJECTION INTRAVENOUS; SUBCUTANEOUS at 14:50

## 2022-04-15 NOTE — PROGRESS NOTE ADULT - PROBLEM SELECTOR PLAN 4
Patient initially presented with YUNIEL on CKD. Improved with IVF. Now at serum Cr baseline.  -Strict I/Os  -Avoid nephrotoxins  -Dose meds per eGFR Patient with large stool burden, unable to pass stool without manual extraction  -c/w senna, Miralax, dulcolax suppository

## 2022-04-15 NOTE — PROGRESS NOTE ADULT - ATTENDING COMMENTS
Patient seen and examined, care plan discussed with house staff as above.    61yoM w hx of chronic pancreatitis (2/2 ?remote etoh use), CKD Stage IV, HCV (s/p 8 weeks treatment, unclear when, unable to confirm), hx of gastric perf w peritonitis and MSSA bacteremia in 2019 requiring ex lap and prolonged hospitalization, 100 lb weight loss unintentionally, transferred from  with esophageal stricture w intractable hiccups (now improved) and epigastric pain (persists); in need of advanced GI specialist as scope could not be advanced in prior EGD at  4/6. Pt denies dysphagia, described ongoing pain of esophagus.     Found to be febrile on admission to [facility] of [facility]F. Infectious work up as above-- CXR without infiltrate, though patient is high risk for aspiration event, blood cultures, urine cultures pending, HIV neg 4/6, no evidence of necrotizing pancreatitis on CT (continues to show pancreatic ductal dilation). Review of prior CT ap, however patient has not had CT chest to eval esophagus/surrounding structures since Dec 2021 at which time he had CT w oral contrast (see above). Above findings are highly concerning for occult malignancy. Note lytic lesion of iliac crest L side on CT ap 4/6, stable from prior imaging.  Prior EGD with biopsy Jan 2022 showed gastritis/esophagitis, biopsy obtained at  4/6 w path concerning for pill esophagitis, H pylori neg on initial EGD bx in Dec 2021. CEA 4.5 in 2019. F/u CA-19-9 and CEA (pending). Baseline EKG 4/14 nonischemic.     Cont IV PPI BID. Hold off on antibiotics pending work up as above for now. Low threshold to resume zosyn empirically if he fevers again and get further esophageal/abdominal imaging. GI planning for EGD Mon 4/18.    Ongoing pain related to esophagitis--> transition all pill meds to solution. Cont carafate, PPI. Pt declines dilaudid, wants morphine (would not give iso CKD IV). Considered TUMS, but would likely worsen his constipation. Consult pain mgmt.     Constipation- treat aggressively w BID miralax/senna and daily enema until BM.

## 2022-04-15 NOTE — PROGRESS NOTE ADULT - ASSESSMENT
61M with history of chronic pancreatitis, HCV, CKD, hx of esophagitis, presenting w/ abd pain, transferred for evaluation of esophageal stricture.    Impression:  #Esophageal stricture w/ dysphagia - Likely benign peptic stricture given long-standing severe esophagitis.  #Abd pain - likely 2/2 chronic pancreatitis. Unrelated to stricture. Exam is notable for severe TTP to light skin touch. Possible neuropathic component.  #HCV s/p treatment (neg viral load)  #CKD  #Fever    Recommendations:  - Pain control per primary team. Consider addition of gabapentin given c/f neuropathic component.  - Eventual EGD w/ possible dilation vs stenting. Likely on Monday 4/18 or Tuesday 4/19.  - Diet as tolerated    Advanced GI will continue to follow.     Recommendations preliminary until signed by attending.     Panda Clarke  Gastroenterology/Hepatology Fellow  Available via Microsoft Teams    NON-URGENT CONSULTS:  Please email [email or web address]  AT NIGHT AND ON WEEKENDS:  Contact on-call GI fellow via answering service ([phone]) from 5pm-8am and on weekends/holidays  MONDAY-FRIDAY 8AM-5PM:  Pager# [phone] ([facility]) or [phone] ([facility])  GI Phone# [phone] ([facility])

## 2022-04-15 NOTE — PROGRESS NOTE ADULT - PROBLEM SELECTOR PLAN 5
Hx of esophagitis, with biopsies from 4/8 EGD concerning for possible pill esophagitis.  -Liquid suspension medications when available  -Switch PPI suspension BID to PPI IV BID   -C/w carafate Patient initially presented with YUNIEL on CKD. Improved with IVF. Now at serum Cr baseline.  -Strict I/Os  -Avoid nephrotoxins  -Dose meds per eGFR

## 2022-04-15 NOTE — PROGRESS NOTE ADULT - PROBLEM SELECTOR PLAN 1
Patient with fever on admission to 39.3 with tachycardia. Unclear infectious source, no leukocytosis. S/p 1 dose of zosyn. Consider tumor fever,.   -F/u blood cx x2  -F/u UA/urine culture  -AP CXR without focal consolidation  -Monitor off further antibiotics unless fever recurs Patient found to have esophageal stricture 35cm from incisure, unable to pass EGD scope on 4/8. Transferred to [facility] for advanced GI eval.  -per advanced GI, f/u infectious workup prior to intervention   -Aspiration precautions, head of bed elevation  -patient refused pureed diet, states he has been tolerating regular texture diet; does not feel food gets stuck in throat, counseled on risk of aspiration, patient amenable to soft and bite sized diet   -IV dilaudid 0.25mg PRN for moderate pain, 0.5mg PRN for severe pain, patient endorses temporary relief with 0.25mg dilaudid.  -concern for extrinsic compression given CT chest in December 2021 with fullness around esophogeal junction, per GI now attributing compression to large stool burden? Follow GI recs, so far not recommending chest imaging; iso CKD, hold off IV contrast for now

## 2022-04-15 NOTE — PROGRESS NOTE ADULT - ATTENDING COMMENTS
61M with history of chronic pancreatitis, HCV, CKD, hx of esophagitis, presenting w/ abd pain, transferred for evaluation of esophageal stricture.    Plan: EGD with dilation.

## 2022-04-15 NOTE — PROGRESS NOTE ADULT - ASSESSMENT
62 yo M with hx of remote ETOH use, chronic pancreatitis, esophagitis, HCV s/p treatment, CKD4, initially presented to [facility] with abdominal pain found to have esophageal stricture on EGD transferred to [facility] for advanced GI evaluation.

## 2022-04-15 NOTE — PROGRESS NOTE ADULT - SUBJECTIVE AND OBJECTIVE BOX
Chief Complaint:  Patient is a 61y old  Male who presents with a chief complaint of Esophageal stricture (15 Apr 2022 08:11)    Reason for consult: esophageal stricture    Interval Events: Still c/o severe abd pain, tolerating soft diet    Hospital Medications:  bisacodyl Suppository 10 milliGRAM(s) Rectal daily  folic acid 1 milliGRAM(s) Oral daily  gabapentin Solution 200 milliGRAM(s) Oral at bedtime  heparin   Injectable 5000 Unit(s) SubCutaneous every 8 hours  HYDROmorphone  Injectable 0.25 milliGRAM(s) IV Push every 6 hours PRN  lactated ringers. 1000 milliLiter(s) IV Continuous <Continuous>  melatonin 3 milliGRAM(s) Oral at bedtime PRN  metoclopramide 10 milliGRAM(s) Oral four times a day  multivitamin 1 Tablet(s) Oral daily  pancrelipase  (CREON  6,000 Lipase Units) 2 Capsule(s) Oral three times a day with meals  pantoprazole  Injectable 40 milliGRAM(s) IV Push two times a day  polyethylene glycol 3350 17 Gram(s) Oral daily  senna 2 Tablet(s) Oral at bedtime  sucralfate 1 Gram(s) Oral every 6 hours  tamsulosin 0.4 milliGRAM(s) Oral at bedtime  thiamine 100 milliGRAM(s) Oral daily      ROS:   [ ] 14 point ROS negative other than as above  [x] Patient unable to provide    PHYSICAL EXAM:   Vital Signs:  Vital Signs Last 24 Hrs  T(C): 36.5 (15 Apr 2022 06:40), Max: 36.8 (14 Apr 2022 15:13)  T(F): 97.7 (15 Apr 2022 06:40), Max: 98.3 (14 Apr 2022 15:13)  HR: 59 (15 Apr 2022 06:40) (59 - 68)  BP: 115/51 (15 Apr 2022 06:40) (96/62 - 116/58)  BP(mean): --  RR: 16 (15 Apr 2022 06:40) (16 - 18)  SpO2: 100% (15 Apr 2022 06:40) (99% - 100%)  Daily     Daily     GENERAL: no acute distress  NEURO: alert  HEENT: anicteric sclera  CHEST: no respiratory distress, no accessory muscle use  ABDOMEN: soft, severely tender to light palpation  EXTREMITIES: warm, well perfused, no edema  SKIN: no jaundice    LABS: reviewed                        9.6    4.63  )-----------( 186      ( 15 Apr 2022 07:43 )             28.9     04-15    141  |  108<H>  |  32<H>  ----------------------------<  88  3.5   |  19<L>  |  2.62<H>    Ca    8.4      15 Apr 2022 07:43  Phos  2.9     04-15  Mg     2.20     04-15    TPro  5.1<L>  /  Alb  2.5<L>  /  TBili  <0.2  /  DBili  x   /  AST  12  /  ALT  13  /  AlkPhos  78  04-15    LIVER FUNCTIONS - ( 15 Apr 2022 07:43 )  Alb: 2.5 g/dL / Pro: 5.1 g/dL / ALK PHOS: 78 U/L / ALT: 13 U/L / AST: 12 U/L / GGT: x             Interval Diagnostic Studies: see sunrise for full report

## 2022-04-15 NOTE — PROGRESS NOTE ADULT - PROBLEM SELECTOR PLAN 9
DVT ppx: Started heparin subQ in case of underlying malignancy, SCDs if GI planning procedure   Diet: soft + bite sized  Dispo: pending clinical course

## 2022-04-15 NOTE — PROGRESS NOTE ADULT - PROBLEM SELECTOR PLAN 8
DVT ppx: Started heparin subQ in case of underlying malignancy, SCDs if GI planning procedure   Diet: NPO pending GI procedure  Dispo: pending clinical course Hx of pancreatitis with CT evidence of chronic pancreatitis.  -C/w CREON

## 2022-04-15 NOTE — CONSULT NOTE ADULT - SUBJECTIVE AND OBJECTIVE BOX
Chief Complaint:  unrelieved chest and epigastric pain when hiccuping or eating    HPI:  62 yo M with hx of remote ETOH use, chronic pancreatitis, esophagitis, HCV, CKD4, initially presented to [facility] with abdominal pain. Underwent EGD 4/8 with findings of esophageal structure at 35cm from incisors, with inability to pass scope. He was transferred to [facility] for advanced gastroenterology evaluation. Patient seen and examined at bedside reports diffuse epigastric abdominal pain with 1 episode of nausea and emesis after taking 'yellow pill', per chart review appears to be 4mg dilaudid. Patient reports chronic hiccups for 'years' and recent weight loss reporting baseline weight ~215 pounds, weight on admission 126.3#. On arrival patient was febrile and tachycardic without acute complaints from baseline chronic issues.    ED Course:  Tm 39.3, ->71, /54, SpO2 98% on RA  Febrile and tachycardic on arrival to [facility]. Cultured and given 1g tylenol, 1 dose zosyn. CXR without focal consolidation. (14 Apr 2022 01:56)      PAST MEDICAL & SURGICAL HISTORY:  ETOH abuse  Pancreatitis  Hepatitis C  treated  Gout  Gastric perforation    FAMILY HISTORY:  FH: HTN (hypertension)    SOCIAL HISTORY:  [ x] Patient Denies any recent use Smoking, Alcohol, or Drug Use    Allergies  No Known Allergies    PAIN MEDICATIONS:  gabapentin Solution 200 milliGRAM(s) Oral at bedtime  HYDROmorphone  Injectable 0.25 milliGRAM(s) IV Push every 6 hours PRN  melatonin 3 milliGRAM(s) Oral at bedtime PRN    Heme:  heparin   Injectable 5000 Unit(s) SubCutaneous every 8 hours    Cardiovascular:  tamsulosin 0.4 milliGRAM(s) Oral at bedtime    GI:  bisacodyl Suppository 10 milliGRAM(s) Rectal daily  metoclopramide 10 milliGRAM(s) Oral four times a day  pancrelipase  (CREON  6,000 Lipase Units) 2 Capsule(s) Oral three times a day with meals  pantoprazole  Injectable 40 milliGRAM(s) IV Push two times a day  polyethylene glycol 3350 17 Gram(s) Oral daily  senna 2 Tablet(s) Oral at bedtime  sucralfate 1 Gram(s) Oral every 6 hours      All Other Medications:  folic acid 1 milliGRAM(s) Oral daily  lactated ringers. 1000 milliLiter(s) IV Continuous <Continuous>  multivitamin 1 Tablet(s) Oral daily  thiamine 100 milliGRAM(s) Oral daily      Vital Signs Last 24 Hrs  T(C): 36.7 (15 Apr 2022 14:20), Max: 36.7 (15 Apr 2022 14:20)  T(F): 98.1 (15 Apr 2022 14:20), Max: 98.1 (15 Apr 2022 14:20)  HR: 76 (15 Apr 2022 14:20) (59 - 76)  BP: 126/71 (15 Apr 2022 14:20) (96/62 - 126/71)  BP(mean): --  RR: 17 (15 Apr 2022 14:20) (16 - 18)  SpO2: 100% (15 Apr 2022 14:20) (99% - 100%)    PAIN SCORE:  10/10       SCALE USED: (1-10 VNRS)           LABS:                          9.6    4.63  )-----------( 186      ( 15 Apr 2022 07:43 )             28.9     04-15    141  |  108<H>  |  32<H>  ----------------------------<  88  3.5   |  19<L>  |  2.62<H>    Ca    8.4      15 Apr 2022 07:43  Phos  2.9     04-15  Mg     2.20     04-15    TPro  5.1<L>  /  Alb  2.5<L>  /  TBili  <0.2  /  DBili  x   /  AST  12  /  ALT  13  /  AlkPhos  78  04-15    PT/INR - ( 14 Apr 2022 07:20 )   PT: 12.5 sec;   INR: 1.08 ratio         PTT - ( 14 Apr 2022 07:20 )  PTT:29.3 sec    [x]  [place]  Reviewed, last dispensed  Percocet 5/325mg, Quantity 112, 14 Day supply    Summary:  Patient seen at the edge of the bed drinking milk.  The patient states that he has considerable, 10/10 pain on his chest and epigastric area that is aggravated when he hiccups or eats/drinks.  The patient states for the past 10 years he has been getting hiccups and experiencing this sharp, burning and squeezing pain.  Sometimes the hiccups and pain are so intense that it, "knocks his breath away."  At home, the patient admits to taking Percocet 5/325mg tablets, 2 of them, 4 times a day which was confirmed by the I-stop and prescribed by Dr. Lamberto Diaz.  Patient revealed that the Percocet gives him about 4 hours of pain relief.  However, the pain in his chest worsened, hence consult to ED.  In the hospital, since being admitted he found that IV Morphine 2mg x1 helps the most.  The IV Dilaudid did nothing for his pain, except cause him to feel worse all over and his kidneys go bad. As per team, the patient has an esophageal stricture. This Monday, 4/18/22, GI may possibly stent or dilate the stricture.     PHYSICAL EXAM:  GENERAL: Alert & Oriented x 3 in NAD, well-groomed, with beard.  The patient is able to sit on the edge of the bed without difficulty and did not appear to be in any respiratory distress.      Impression/Plan: Requested by Medicine team help manage pain.   Recommendations:  -  Consider discontinuing IV Dilaudid 0.25mg, instead order po Oxycodone 10mg solution every 4 hours PRN for moderate to severe pain.  Hold for oversedation. Not to be given within 1 hour of any other immediate acting opioid.  -  Consider ordering Flexeril 5mg every 8 hours PRN for muscle spasm.   -  Consider ordering po Acetaminophen 650mg every 6 hours standing x2 days, then PRN for pain. Do not give within 6 hours of last dose of Tylenol.   -  Recommend maintaining continuous pulse oximetry.  -  Recommend follow up with GI for possible esophageal stricture stenting or dilatation.  -  Recommend follow up with Chronic Pain doctor when discharged, if pain persists. If patient does not have a Chronic Pain doctor, may acquire one through patient's personal insurance carrier.  Discussed patient with Chronic Pain Attending on call, Dr. YAYO Boggs, and he agrees with the above recommendations.  No further recommendations at this time, Chronic pain service to sign off. May call Chronic Pain Service if needed.   Thank you.

## 2022-04-15 NOTE — PROGRESS NOTE ADULT - PROBLEM SELECTOR PLAN 3
Patient found to have esophageal stricture 35cm from incisure, unable to pass EGD scope on 4/8. Transferred to [facility] for advanced GI eval.  -per advanced GI, f/u infectious workup prior to intervention   -Aspiration precautions, head of bed elevation  -patient refused pureed diet, states he has been tolerating regular texture diet; does not feel food gets stuck in throat, counseled on risk of aspiration, patient amenable to soft and bite sized diet   -IV dilaudid 0.25mg PRN for moderate pain, 0.5mg PRN for severe pain, patient endorses temporary relief with 0.25mg dilaudid.  -concern for extrinsic compression given CT chest in December 2021 with fullness around esophogeal junction, per GI now attributing compression to large stool burden? Follow GI recs, so far not recommending chest imaging; iso CKD, hold off IV contrast for now pt reports 10/10 chest pain - likely in the setting of esophagitis/esophageal stricture with extrinsic compression   -low suspicion for cardiac etiology - EKG: without ischemic changes, troponin negative   -pt refusing dilaudid/tylenol, requesting morphine - however given YUNIEL, not a candidate  -will consult pain management for alternatives

## 2022-04-16 LAB
ALBUMIN SERPL ELPH-MCNC: 3.2 G/DL — LOW (ref 3.3–5)
ALP SERPL-CCNC: 89 U/L — SIGNIFICANT CHANGE UP (ref 40–120)
ALT FLD-CCNC: 24 U/L — SIGNIFICANT CHANGE UP (ref 4–41)
ANION GAP SERPL CALC-SCNC: 13 MMOL/L — SIGNIFICANT CHANGE UP (ref 7–14)
AST SERPL-CCNC: 20 U/L — SIGNIFICANT CHANGE UP (ref 4–40)
BASOPHILS # BLD AUTO: 0.02 K/UL — SIGNIFICANT CHANGE UP (ref 0–0.2)
BASOPHILS NFR BLD AUTO: 0.4 % — SIGNIFICANT CHANGE UP (ref 0–2)
BILIRUB SERPL-MCNC: <0.2 MG/DL — SIGNIFICANT CHANGE UP (ref 0.2–1.2)
BUN SERPL-MCNC: 31 MG/DL — HIGH (ref 7–23)
CALCIUM SERPL-MCNC: 8.7 MG/DL — SIGNIFICANT CHANGE UP (ref 8.4–10.5)
CHLORIDE SERPL-SCNC: 110 MMOL/L — HIGH (ref 98–107)
CO2 SERPL-SCNC: 20 MMOL/L — LOW (ref 22–31)
CREAT SERPL-MCNC: 2.72 MG/DL — HIGH (ref 0.5–1.3)
EGFR: 26 ML/MIN/1.73M2 — LOW
EOSINOPHIL # BLD AUTO: 0 K/UL — SIGNIFICANT CHANGE UP (ref 0–0.5)
EOSINOPHIL NFR BLD AUTO: 0 % — SIGNIFICANT CHANGE UP (ref 0–6)
GLUCOSE SERPL-MCNC: 105 MG/DL — HIGH (ref 70–99)
HCT VFR BLD CALC: 32.1 % — LOW (ref 39–50)
HGB BLD-MCNC: 10 G/DL — LOW (ref 13–17)
IANC: 2.7 K/UL — SIGNIFICANT CHANGE UP (ref 1.8–7.4)
IMM GRANULOCYTES NFR BLD AUTO: 0.4 % — SIGNIFICANT CHANGE UP (ref 0–1.5)
LYMPHOCYTES # BLD AUTO: 1.69 K/UL — SIGNIFICANT CHANGE UP (ref 1–3.3)
LYMPHOCYTES # BLD AUTO: 34.4 % — SIGNIFICANT CHANGE UP (ref 13–44)
MAGNESIUM SERPL-MCNC: 2.3 MG/DL — SIGNIFICANT CHANGE UP (ref 1.6–2.6)
MCHC RBC-ENTMCNC: 28.7 PG — SIGNIFICANT CHANGE UP (ref 27–34)
MCHC RBC-ENTMCNC: 31.2 GM/DL — LOW (ref 32–36)
MCV RBC AUTO: 92.2 FL — SIGNIFICANT CHANGE UP (ref 80–100)
MONOCYTES # BLD AUTO: 0.48 K/UL — SIGNIFICANT CHANGE UP (ref 0–0.9)
MONOCYTES NFR BLD AUTO: 9.8 % — SIGNIFICANT CHANGE UP (ref 2–14)
NEUTROPHILS # BLD AUTO: 2.7 K/UL — SIGNIFICANT CHANGE UP (ref 1.8–7.4)
NEUTROPHILS NFR BLD AUTO: 55 % — SIGNIFICANT CHANGE UP (ref 43–77)
NRBC # BLD: 0 /100 WBCS — SIGNIFICANT CHANGE UP
NRBC # FLD: 0 K/UL — SIGNIFICANT CHANGE UP
PHOSPHATE SERPL-MCNC: 2.3 MG/DL — LOW (ref 2.5–4.5)
PLATELET # BLD AUTO: 258 K/UL — SIGNIFICANT CHANGE UP (ref 150–400)
POTASSIUM SERPL-MCNC: 3.3 MMOL/L — LOW (ref 3.5–5.3)
POTASSIUM SERPL-SCNC: 3.3 MMOL/L — LOW (ref 3.5–5.3)
PROT SERPL-MCNC: 6.2 G/DL — SIGNIFICANT CHANGE UP (ref 6–8.3)
RBC # BLD: 3.48 M/UL — LOW (ref 4.2–5.8)
RBC # FLD: 18 % — HIGH (ref 10.3–14.5)
SODIUM SERPL-SCNC: 143 MMOL/L — SIGNIFICANT CHANGE UP (ref 135–145)
WBC # BLD: 4.91 K/UL — SIGNIFICANT CHANGE UP (ref 3.8–10.5)
WBC # FLD AUTO: 4.91 K/UL — SIGNIFICANT CHANGE UP (ref 3.8–10.5)

## 2022-04-16 PROCEDURE — 99233 SBSQ HOSP IP/OBS HIGH 50: CPT | Mod: GC

## 2022-04-16 RX ORDER — SODIUM,POTASSIUM PHOSPHATES 278-250MG
1 POWDER IN PACKET (EA) ORAL ONCE
Refills: 0 | Status: COMPLETED | OUTPATIENT
Start: 2022-04-16 | End: 2022-04-16

## 2022-04-16 RX ORDER — POLYETHYLENE GLYCOL 3350 17 G/17G
17 POWDER, FOR SOLUTION ORAL
Refills: 0 | Status: DISCONTINUED | OUTPATIENT
Start: 2022-04-16 | End: 2022-04-23

## 2022-04-16 RX ORDER — SODIUM CHLORIDE 9 MG/ML
1000 INJECTION, SOLUTION INTRAVENOUS
Refills: 0 | Status: COMPLETED | OUTPATIENT
Start: 2022-04-16 | End: 2022-04-16

## 2022-04-16 RX ORDER — CHLORPROMAZINE HCL 10 MG
10 TABLET ORAL EVERY 8 HOURS
Refills: 0 | Status: DISCONTINUED | OUTPATIENT
Start: 2022-04-16 | End: 2022-04-18

## 2022-04-16 RX ORDER — POTASSIUM CHLORIDE 20 MEQ
40 PACKET (EA) ORAL ONCE
Refills: 0 | Status: COMPLETED | OUTPATIENT
Start: 2022-04-16 | End: 2022-04-16

## 2022-04-16 RX ADMIN — Medication 1 GRAM(S): at 18:56

## 2022-04-16 RX ADMIN — GABAPENTIN 200 MILLIGRAM(S): 400 CAPSULE ORAL at 22:06

## 2022-04-16 RX ADMIN — Medication 1 MILLIGRAM(S): at 13:04

## 2022-04-16 RX ADMIN — Medication 2 CAPSULE(S): at 18:57

## 2022-04-16 RX ADMIN — Medication 650 MILLIGRAM(S): at 06:25

## 2022-04-16 RX ADMIN — Medication 10 MILLIGRAM(S): at 06:25

## 2022-04-16 RX ADMIN — Medication 650 MILLIGRAM(S): at 12:31

## 2022-04-16 RX ADMIN — TAMSULOSIN HYDROCHLORIDE 0.4 MILLIGRAM(S): 0.4 CAPSULE ORAL at 22:07

## 2022-04-16 RX ADMIN — Medication 650 MILLIGRAM(S): at 00:44

## 2022-04-16 RX ADMIN — OXYCODONE HYDROCHLORIDE 10 MILLIGRAM(S): 5 TABLET ORAL at 03:19

## 2022-04-16 RX ADMIN — CYCLOBENZAPRINE HYDROCHLORIDE 5 MILLIGRAM(S): 10 TABLET, FILM COATED ORAL at 14:17

## 2022-04-16 RX ADMIN — Medication 650 MILLIGRAM(S): at 18:56

## 2022-04-16 RX ADMIN — Medication 1 GRAM(S): at 13:04

## 2022-04-16 RX ADMIN — Medication 1 TABLET(S): at 16:49

## 2022-04-16 RX ADMIN — OXYCODONE HYDROCHLORIDE 10 MILLIGRAM(S): 5 TABLET ORAL at 03:49

## 2022-04-16 RX ADMIN — PANTOPRAZOLE SODIUM 40 MILLIGRAM(S): 20 TABLET, DELAYED RELEASE ORAL at 18:55

## 2022-04-16 RX ADMIN — PANTOPRAZOLE SODIUM 40 MILLIGRAM(S): 20 TABLET, DELAYED RELEASE ORAL at 06:27

## 2022-04-16 RX ADMIN — Medication 1 GRAM(S): at 00:45

## 2022-04-16 RX ADMIN — OXYCODONE HYDROCHLORIDE 10 MILLIGRAM(S): 5 TABLET ORAL at 17:20

## 2022-04-16 RX ADMIN — Medication 10 MILLIGRAM(S): at 13:04

## 2022-04-16 RX ADMIN — Medication 10 MILLIGRAM(S): at 19:18

## 2022-04-16 RX ADMIN — HEPARIN SODIUM 5000 UNIT(S): 5000 INJECTION INTRAVENOUS; SUBCUTANEOUS at 22:05

## 2022-04-16 RX ADMIN — Medication 10 MILLIGRAM(S): at 00:44

## 2022-04-16 RX ADMIN — POLYETHYLENE GLYCOL 3350 17 GRAM(S): 17 POWDER, FOR SOLUTION ORAL at 13:05

## 2022-04-16 RX ADMIN — Medication 650 MILLIGRAM(S): at 19:37

## 2022-04-16 RX ADMIN — OXYCODONE HYDROCHLORIDE 10 MILLIGRAM(S): 5 TABLET ORAL at 10:24

## 2022-04-16 RX ADMIN — Medication 1 GRAM(S): at 06:26

## 2022-04-16 RX ADMIN — HEPARIN SODIUM 5000 UNIT(S): 5000 INJECTION INTRAVENOUS; SUBCUTANEOUS at 06:27

## 2022-04-16 RX ADMIN — Medication 650 MILLIGRAM(S): at 06:55

## 2022-04-16 RX ADMIN — OXYCODONE HYDROCHLORIDE 10 MILLIGRAM(S): 5 TABLET ORAL at 09:54

## 2022-04-16 RX ADMIN — Medication 650 MILLIGRAM(S): at 01:14

## 2022-04-16 RX ADMIN — Medication 2 CAPSULE(S): at 09:36

## 2022-04-16 RX ADMIN — Medication 100 MILLIGRAM(S): at 13:03

## 2022-04-16 RX ADMIN — SENNA PLUS 2 TABLET(S): 8.6 TABLET ORAL at 22:07

## 2022-04-16 RX ADMIN — Medication 1 TABLET(S): at 13:04

## 2022-04-16 RX ADMIN — SODIUM CHLORIDE 100 MILLILITER(S): 9 INJECTION, SOLUTION INTRAVENOUS at 09:55

## 2022-04-16 RX ADMIN — Medication 2 CAPSULE(S): at 13:07

## 2022-04-16 RX ADMIN — Medication 650 MILLIGRAM(S): at 12:51

## 2022-04-16 RX ADMIN — OXYCODONE HYDROCHLORIDE 10 MILLIGRAM(S): 5 TABLET ORAL at 16:49

## 2022-04-16 RX ADMIN — POLYETHYLENE GLYCOL 3350 17 GRAM(S): 17 POWDER, FOR SOLUTION ORAL at 19:18

## 2022-04-16 RX ADMIN — Medication 40 MILLIEQUIVALENT(S): at 09:55

## 2022-04-16 RX ADMIN — HEPARIN SODIUM 5000 UNIT(S): 5000 INJECTION INTRAVENOUS; SUBCUTANEOUS at 14:52

## 2022-04-16 NOTE — PROGRESS NOTE ADULT - PROBLEM SELECTOR PLAN 3
pt reports 10/10 chest pain - likely in the setting of esophagitis/esophageal stricture with extrinsic compression   -low suspicion for cardiac etiology - EKG: without ischemic changes, troponin negative   -pt refusing dilaudid/tylenol, requesting morphine - however given YUNIEL, not a candidate  - pain management rec oxy 10mg q4h prn, tylenol 650mg ATC x2 days, gabapentin renally dosed, and flexeril prn pt reports 10/10 chest pain - likely in the setting of esophagitis/esophageal stricture with extrinsic compression   -low suspicion for cardiac etiology - EKG: without ischemic changes, troponin negative   -pt refusing dilaudid/tylenol, requesting morphine - however given YUNIEL, not a candidate  - pain management rec oxy 10mg q4h prn, tylenol 650mg ATC x2 days, gabapentin renally dosed, and flexeril prn  - Reglan 10mg TID switched to thorazine 10mg TID for intractable hiccups 4/16

## 2022-04-16 NOTE — PROGRESS NOTE ADULT - PROBLEM SELECTOR PLAN 2
Patient with fever on admission to 39.3 with tachycardia. Unclear infectious source, no leukocytosis. S/p 1 dose of zosyn. Consider tumor fever,.   -blood cxs: NGTD  -UA/urine culture negative  -AP CXR without focal consolidation  -Monitor off further antibiotics unless fever recurs (0) independent

## 2022-04-16 NOTE — PROGRESS NOTE ADULT - ASSESSMENT
60 yo M with hx of remote ETOH use, chronic pancreatitis, esophagitis, HCV s/p treatment, CKD4, initially presented to [facility] with abdominal pain found to have esophageal stricture on EGD transferred to [facility] for advanced GI evaluation.

## 2022-04-16 NOTE — PROGRESS NOTE ADULT - PROBLEM SELECTOR PLAN 4
Patient with large stool burden, unable to pass stool without manual extraction  -c/w senna, Miralax, dulcolax suppository

## 2022-04-16 NOTE — DIETITIAN NUTRITION RISK NOTIFICATION - TREATMENT: THE FOLLOWING DIET HAS BEEN RECOMMENDED
Diet, Regular:   Supplement Feeding Modality:  Oral  Nepro Cans or Servings Per Day:  1       Frequency:  Two Times a day (04-15-22 @ 13:08) [Active]

## 2022-04-16 NOTE — DIETITIAN INITIAL EVALUATION ADULT - WEIGHT IN LBS
Preventing Falls: Care Instructions  Your Care Instructions  Getting around your home safely can be a challenge if you have injuries or health problems that make it easy for you to fall. Loose rugs and furniture in walkways are among the dangers for many older people who have problems walking or who have poor eyesight. People who have conditions such as arthritis, osteoporosis, or dementia also have to be careful not to fall. You can make your home safer with a few simple measures. Follow-up care is a key part of your treatment and safety. Be sure to make and go to all appointments, and call your doctor if you are having problems. It's also a good idea to know your test results and keep a list of the medicines you take. How can you care for yourself at home? Taking care of yourself  · You may get dizzy if you do not drink enough water. To prevent dehydration, drink plenty of fluids, enough so that your urine is light yellow or clear like water. Choose water and other caffeine-free clear liquids. If you have kidney, heart, or liver disease and have to limit fluids, talk with your doctor before you increase the amount of fluids you drink. · Exercise regularly to improve your strength, muscle tone, and balance. Walk if you can. Swimming may be a good choice if you cannot walk easily. · Have your vision and hearing checked each year or any time you notice a change. If you have trouble seeing and hearing, you might not be able to avoid objects and could lose your balance. · Know the side effects of the medicines you take. Ask your doctor or pharmacist whether the medicines you take can affect your balance. Sleeping pills or sedatives can affect your balance. · Limit the amount of alcohol you drink. Alcohol can impair your balance and other senses. · Ask your doctor whether calluses or corns on your feet need to be removed.  If you wear loose-fitting shoes because of calluses or corns, you can lose your balance and fall. · Talk to your doctor if you have numbness in your feet. Preventing falls at home  · Remove raised doorway thresholds, throw rugs, and clutter. Repair loose carpet or raised areas in the floor. · Move furniture and electrical cords to keep them out of walking paths. · Use nonskid floor wax, and wipe up spills right away, especially on ceramic tile floors. · If you use a walker or cane, put rubber tips on it. If you use crutches, clean the bottoms of them regularly with an abrasive pad, such as steel wool. · Keep your house well lit, especially [place] [address], and outside walkways. Use night-lights in areas such as hallways and bathrooms. Add extra light switches or use remote switches (such as switches that go on or off when you clap your hands) to make it easier to turn lights on if you have to get up during the night. · Install sturdy handrails on stairways. · Move items in your cabinets so that the things you use a lot are on the lower shelves (about waist level). · Keep a cordless phone and a flashlight with new batteries by your bed. If possible, put a phone in each of the main rooms of your house, or carry a cell phone in case you fall and cannot reach a phone. Or, you can wear a device around your neck or wrist. You push a button that sends a signal for help. · Wear low-heeled shoes that fit well and give your feet good support. Use footwear with nonskid soles. Check the heels and soles of your shoes for wear. Repair or replace worn heels or soles. · Do not wear socks without shoes on wood floors. · Walk on the grass when the sidewalks are slippery. If you live in an area that gets snow and ice in the winter, sprinkle salt on slippery steps and sidewalks. Preventing falls in the bath  · Install grab bars and nonskid mats inside and outside your shower or tub and near the toilet and sinks. · Use shower chairs and bath benches.   · Use a hand-held shower head that will allow you to sit while showering. · Get into a tub or shower by putting the weaker leg in first. Get out of a tub or shower with your strong side first.  · Repair loose toilet seats and consider installing a raised toilet seat to make getting on and off the toilet easier. · Keep your bathroom door unlocked while you are in the shower. Where can you learn more? Go to http://casey-sameer.info/. Enter [id number] [id number] [phone] in the search box to learn more about \"Preventing Falls: Care Instructions. \"  Current as of: August 4, 2016  Content Version: 11.2  © 5815-5686 [employer]. Care instructions adapted under license by [employer] (which disclaims liability or warranty for this information). If you have questions about a medical condition or this instruction, always ask your healthcare professional. [address] any warranty or liability for your use of this information. Please contact our office if you have any questions about your visit today. 139.2

## 2022-04-16 NOTE — DIETITIAN INITIAL EVALUATION ADULT - PERTINENT MEDS FT
MEDICATIONS  (STANDING):  acetaminophen     Tablet .. 650 milliGRAM(s) Oral every 6 hours  bisacodyl Suppository 10 milliGRAM(s) Rectal daily  folic acid 1 milliGRAM(s) Oral daily  gabapentin Solution 200 milliGRAM(s) Oral at bedtime  heparin   Injectable 5000 Unit(s) SubCutaneous every 8 hours  lactated ringers. 500 milliLiter(s) (500 mL/Hr) IV Continuous <Continuous>  lactated ringers. 500 milliLiter(s) (500 mL/Hr) IV Continuous <Continuous>  lactated ringers. 1000 milliLiter(s) (100 mL/Hr) IV Continuous <Continuous>  metoclopramide 10 milliGRAM(s) Oral four times a day  multivitamin 1 Tablet(s) Oral daily  pancrelipase  (CREON  6,000 Lipase Units) 2 Capsule(s) Oral three times a day with meals  pantoprazole  Injectable 40 milliGRAM(s) IV Push two times a day  polyethylene glycol 3350 17 Gram(s) Oral daily  senna 2 Tablet(s) Oral at bedtime  sucralfate 1 Gram(s) Oral every 6 hours  tamsulosin 0.4 milliGRAM(s) Oral at bedtime  thiamine 100 milliGRAM(s) Oral daily    MEDICATIONS  (PRN):  cyclobenzaprine 5 milliGRAM(s) Oral three times a day PRN Muscle Spasm  LORazepam   Injectable 1 milliGRAM(s) IV Push once PRN Anxiety  melatonin 3 milliGRAM(s) Oral at bedtime PRN Insomnia  oxyCODONE    Solution 10 milliGRAM(s) Oral every 4 hours PRN Severe Pain (7 - 10)

## 2022-04-16 NOTE — DIETITIAN INITIAL EVALUATION ADULT - ORAL INTAKE PTA/DIET HISTORY
Patient reports diminished appetite and PO intake over 3 months. States he would prepare meals but have lack of desire to consume. Does not admit to any swallowing difficulty.

## 2022-04-16 NOTE — PROGRESS NOTE ADULT - PROBLEM SELECTOR PLAN 6
Hx of esophagitis, with biopsies from 4/8 EGD concerning for possible pill esophagitis.  -Liquid suspension medications when available  -Switch PPI suspension BID to PPI IV BID   -C/w carafate

## 2022-04-16 NOTE — PROGRESS NOTE ADULT - SUBJECTIVE AND OBJECTIVE BOX
Patient is a 61y old  Male who presents with a chief complaint of Esophageal stricture (15 Apr 2022 15:40)    SUBJECTIVE / OVERNIGHT EVENTS:    MEDICATIONS  (STANDING):  acetaminophen     Tablet .. 650 milliGRAM(s) Oral every 6 hours  bisacodyl Suppository 10 milliGRAM(s) Rectal daily  folic acid 1 milliGRAM(s) Oral daily  gabapentin Solution 200 milliGRAM(s) Oral at bedtime  heparin   Injectable 5000 Unit(s) SubCutaneous every 8 hours  lactated ringers. 1000 milliLiter(s) (100 mL/Hr) IV Continuous <Continuous>  lactated ringers. 500 milliLiter(s) (500 mL/Hr) IV Continuous <Continuous>  lactated ringers. 500 milliLiter(s) (500 mL/Hr) IV Continuous <Continuous>  metoclopramide 10 milliGRAM(s) Oral four times a day  multivitamin 1 Tablet(s) Oral daily  pancrelipase  (CREON  6,000 Lipase Units) 2 Capsule(s) Oral three times a day with meals  pantoprazole  Injectable 40 milliGRAM(s) IV Push two times a day  polyethylene glycol 3350 17 Gram(s) Oral daily  senna 2 Tablet(s) Oral at bedtime  sucralfate 1 Gram(s) Oral every 6 hours  tamsulosin 0.4 milliGRAM(s) Oral at bedtime  thiamine 100 milliGRAM(s) Oral daily    MEDICATIONS  (PRN):  cyclobenzaprine 5 milliGRAM(s) Oral three times a day PRN Muscle Spasm  LORazepam   Injectable 1 milliGRAM(s) IV Push once PRN Anxiety  melatonin 3 milliGRAM(s) Oral at bedtime PRN Insomnia  oxyCODONE    Solution 10 milliGRAM(s) Oral every 4 hours PRN Severe Pain (7 - 10)      Vital Signs Last 24 Hrs  T(C): 36.5 (2022 06:27), Max: 36.7 (15 Apr 2022 14:20)  T(F): 97.7 (2022 06:27), Max: 98.1 (15 Apr 2022 14:20)  HR: 77 (2022 06:27) (67 - 77)  BP: 137/78 (2022 06:27) (124/69 - 137/78)  BP(mean): --  RR: 18 (2022 06:27) (17 - 18)  SpO2: 98% (2022 06:27) (98% - 100%)  CAPILLARY BLOOD GLUCOSE        I&O's Summary      PHYSICAL EXAM:  GENERAL: Frail thin man lying in bed  HEAD:  Atraumatic, Normocephalic  EYES: EOMI, PERRLA, conjunctiva and sclera clear  ENT: Moist mucous membranes  NECK: Supple, No JVD  CHEST/LUNG: Clear to auscultation bilaterally; No rales, rhonchi, wheezing, or rubs. Unlabored respirations  HEART: Regular rate and rhythm; No murmurs, rubs, or gallops  ABDOMEN: BSx4; Soft, tender to palpation in epigastrium, no guarding, hiccuping  EXTREMITIES:  1+ Peripheral Pulses, brisk capillary refill. long toenails  NERVOUS SYSTEM:  A&Ox3, no focal deficits   SKIN: No rashes or lesions    LABS:                        9.6    4.63  )-----------( 186      ( 15 Apr 2022 07:43 )             28.9     04-15    141  |  108<H>  |  32<H>  ----------------------------<  88  3.5   |  19<L>  |  2.62<H>    Ca    8.4      15 Apr 2022 07:43  Phos  2.9     04-15  Mg     2.20     04-15    TPro  5.1<L>  /  Alb  2.5<L>  /  TBili  <0.2  /  DBili  x   /  AST  12  /  ALT  13  /  AlkPhos  78  04-15          Urinalysis Basic - ( 15 Apr 2022 23:30 )    Color: Yellow / Appearance: Clear / S.024 / pH: x  Gluc: x / Ketone: Negative  / Bili: Negative / Urobili: <2 mg/dL   Blood: x / Protein: 100 mg/dL / Nitrite: Negative   Leuk Esterase: Negative / RBC: 8 /HPF / WBC 4 /HPF   Sq Epi: x / Non Sq Epi: 3 /HPF / Bacteria: Negative        RADIOLOGY & ADDITIONAL TESTS: Reviewed.   Patient is a 61y old  Male who presents with a chief complaint of Esophageal stricture (15 Apr 2022 15:40)    SUBJECTIVE / OVERNIGHT EVENTS: No acute events overnight.    MEDICATIONS  (STANDING):  acetaminophen     Tablet .. 650 milliGRAM(s) Oral every 6 hours  bisacodyl Suppository 10 milliGRAM(s) Rectal daily  folic acid 1 milliGRAM(s) Oral daily  gabapentin Solution 200 milliGRAM(s) Oral at bedtime  heparin   Injectable 5000 Unit(s) SubCutaneous every 8 hours  lactated ringers. 1000 milliLiter(s) (100 mL/Hr) IV Continuous <Continuous>  lactated ringers. 500 milliLiter(s) (500 mL/Hr) IV Continuous <Continuous>  lactated ringers. 500 milliLiter(s) (500 mL/Hr) IV Continuous <Continuous>  metoclopramide 10 milliGRAM(s) Oral four times a day  multivitamin 1 Tablet(s) Oral daily  pancrelipase  (CREON  6,000 Lipase Units) 2 Capsule(s) Oral three times a day with meals  pantoprazole  Injectable 40 milliGRAM(s) IV Push two times a day  polyethylene glycol 3350 17 Gram(s) Oral daily  senna 2 Tablet(s) Oral at bedtime  sucralfate 1 Gram(s) Oral every 6 hours  tamsulosin 0.4 milliGRAM(s) Oral at bedtime  thiamine 100 milliGRAM(s) Oral daily    MEDICATIONS  (PRN):  cyclobenzaprine 5 milliGRAM(s) Oral three times a day PRN Muscle Spasm  LORazepam   Injectable 1 milliGRAM(s) IV Push once PRN Anxiety  melatonin 3 milliGRAM(s) Oral at bedtime PRN Insomnia  oxyCODONE    Solution 10 milliGRAM(s) Oral every 4 hours PRN Severe Pain (7 - 10)      Vital Signs Last 24 Hrs  T(C): 36.5 (2022 06:27), Max: 36.7 (15 Apr 2022 14:20)  T(F): 97.7 (2022 06:27), Max: 98.1 (15 Apr 2022 14:20)  HR: 77 (2022 06:27) (67 - 77)  BP: 137/78 (2022 06:27) (124/69 - 137/78)  BP(mean): --  RR: 18 (2022 06:27) (17 - 18)  SpO2: 98% (2022 06:27) (98% - 100%)  CAPILLARY BLOOD GLUCOSE        I&O's Summary      PHYSICAL EXAM:  GENERAL: Frail thin man lying in bed  HEAD:  Atraumatic, Normocephalic  EYES: EOMI, PERRLA, conjunctiva and sclera clear  ENT: Moist mucous membranes  NECK: Supple, No JVD  CHEST/LUNG: Clear to auscultation bilaterally; No rales, rhonchi, wheezing, or rubs. Unlabored respirations  HEART: Regular rate and rhythm; No murmurs, rubs, or gallops  ABDOMEN: BSx4; Soft, tender to palpation in epigastrium, no guarding, hiccuping  EXTREMITIES:  1+ Peripheral Pulses, brisk capillary refill. long toenails  NERVOUS SYSTEM:  A&Ox3, no focal deficits   SKIN: No rashes or lesions    LABS:                        9.6    4.63  )-----------( 186      ( 15 Apr 2022 07:43 )             28.9     04-15    141  |  108<H>  |  32<H>  ----------------------------<  88  3.5   |  19<L>  |  2.62<H>    Ca    8.4      15 Apr 2022 07:43  Phos  2.9     04-15  Mg     2.20     04-15    TPro  5.1<L>  /  Alb  2.5<L>  /  TBili  <0.2  /  DBili  x   /  AST  12  /  ALT  13  /  AlkPhos  78  04-15          Urinalysis Basic - ( 15 Apr 2022 23:30 )    Color: Yellow / Appearance: Clear / S.024 / pH: x  Gluc: x / Ketone: Negative  / Bili: Negative / Urobili: <2 mg/dL   Blood: x / Protein: 100 mg/dL / Nitrite: Negative   Leuk Esterase: Negative / RBC: 8 /HPF / WBC 4 /HPF   Sq Epi: x / Non Sq Epi: 3 /HPF / Bacteria: Negative        RADIOLOGY & ADDITIONAL TESTS: Reviewed.   Patient is a 61y old  Male who presents with a chief complaint of Esophageal stricture (15 Apr 2022 15:40)    SUBJECTIVE / OVERNIGHT EVENTS: No acute events overnight. Pt reports persistent epigastric abd pain radiating to chest a/w hiccuping, denies any n/v. Also reports persistent constipation, notes he manually disimpacted himself in bathroom earlier this morning. Denies any fevers/chills or SOB.    MEDICATIONS  (STANDING):  acetaminophen     Tablet .. 650 milliGRAM(s) Oral every 6 hours  bisacodyl Suppository 10 milliGRAM(s) Rectal daily  folic acid 1 milliGRAM(s) Oral daily  gabapentin Solution 200 milliGRAM(s) Oral at bedtime  heparin   Injectable 5000 Unit(s) SubCutaneous every 8 hours  lactated ringers. 1000 milliLiter(s) (100 mL/Hr) IV Continuous <Continuous>  lactated ringers. 500 milliLiter(s) (500 mL/Hr) IV Continuous <Continuous>  lactated ringers. 500 milliLiter(s) (500 mL/Hr) IV Continuous <Continuous>  metoclopramide 10 milliGRAM(s) Oral four times a day  multivitamin 1 Tablet(s) Oral daily  pancrelipase  (CREON  6,000 Lipase Units) 2 Capsule(s) Oral three times a day with meals  pantoprazole  Injectable 40 milliGRAM(s) IV Push two times a day  polyethylene glycol 3350 17 Gram(s) Oral daily  senna 2 Tablet(s) Oral at bedtime  sucralfate 1 Gram(s) Oral every 6 hours  tamsulosin 0.4 milliGRAM(s) Oral at bedtime  thiamine 100 milliGRAM(s) Oral daily    MEDICATIONS  (PRN):  cyclobenzaprine 5 milliGRAM(s) Oral three times a day PRN Muscle Spasm  LORazepam   Injectable 1 milliGRAM(s) IV Push once PRN Anxiety  melatonin 3 milliGRAM(s) Oral at bedtime PRN Insomnia  oxyCODONE    Solution 10 milliGRAM(s) Oral every 4 hours PRN Severe Pain (7 - 10)      Vital Signs Last 24 Hrs  T(C): 36.5 (2022 06:27), Max: 36.7 (15 Apr 2022 14:20)  T(F): 97.7 (2022 06:27), Max: 98.1 (15 Apr 2022 14:20)  HR: 77 (2022 06:27) (67 - 77)  BP: 137/78 (2022 06:27) (124/69 - 137/78)  BP(mean): --  RR: 18 (2022 06:27) (17 - 18)  SpO2: 98% (2022 06:27) (98% - 100%)  CAPILLARY BLOOD GLUCOSE        I&O's Summary      PHYSICAL EXAM:  GENERAL: Frail thin man lying in bed  HEAD:  Atraumatic, Normocephalic  EYES: EOMI, PERRLA, conjunctiva and sclera clear  ENT: Moist mucous membranes  NECK: Supple, No JVD  CHEST/LUNG: Clear to auscultation bilaterally; No rales, rhonchi, wheezing, or rubs. Unlabored respirations  HEART: Regular rate and rhythm; No murmurs, rubs, or gallops  ABDOMEN: BSx4; Soft, tender to palpation in epigastrium, no guarding, hiccuping  EXTREMITIES:  1+ Peripheral Pulses, brisk capillary refill. long toenails  NERVOUS SYSTEM:  A&Ox3, no focal deficits   SKIN: No rashes or lesions    LABS:                        9.6    4.63  )-----------( 186      ( 15 Apr 2022 07:43 )             28.9     04-15    141  |  108<H>  |  32<H>  ----------------------------<  88  3.5   |  19<L>  |  2.62<H>    Ca    8.4      15 Apr 2022 07:43  Phos  2.9     04-15  Mg     2.20     04-15    TPro  5.1<L>  /  Alb  2.5<L>  /  TBili  <0.2  /  DBili  x   /  AST  12  /  ALT  13  /  AlkPhos  78  04-15          Urinalysis Basic - ( 15 Apr 2022 23:30 )    Color: Yellow / Appearance: Clear / S.024 / pH: x  Gluc: x / Ketone: Negative  / Bili: Negative / Urobili: <2 mg/dL   Blood: x / Protein: 100 mg/dL / Nitrite: Negative   Leuk Esterase: Negative / RBC: 8 /HPF / WBC 4 /HPF   Sq Epi: x / Non Sq Epi: 3 /HPF / Bacteria: Negative        RADIOLOGY & ADDITIONAL TESTS: Reviewed.

## 2022-04-16 NOTE — DIETITIAN INITIAL EVALUATION ADULT - PERTINENT LABORATORY DATA
04-16    143  |  110<H>  |  31<H>  ----------------------------<  105<H>  3.3<L>   |  20<L>  |  2.72<H>    Ca    8.7      16 Apr 2022 08:22  Phos  2.3     04-16  Mg     2.30     04-16    TPro  6.2  /  Alb  3.2<L>  /  TBili  <0.2  /  DBili  x   /  AST  20  /  ALT  24  /  AlkPhos  89  04-16  A1C with Estimated Average Glucose Result: 5.5 % (02-09-22 @ 17:47)

## 2022-04-16 NOTE — DIETITIAN INITIAL EVALUATION ADULT - NS FNS DIET ORDER
Diet, Regular:   Supplement Feeding Modality:  Oral  Nepro Cans or Servings Per Day:  1       Frequency:  Two Times a day (04-15-22 @ 13:08)

## 2022-04-16 NOTE — PROGRESS NOTE ADULT - ATTENDING COMMENTS
Patient seen and examined, care plan discussed with house staff as above.    61yoM w hx of chronic pancreatitis (2/2 ?remote etoh use), CKD Stage IV, HCV (s/p 8 weeks treatment, unclear when, unable to confirm), hx of gastric perf w peritonitis and MSSA bacteremia in 2019 requiring ex lap and prolonged hospitalization, 100 lb weight loss unintentionally, transferred from  with esophageal stricture w intractable hiccups (now improved) and epigastric pain (persists); in need of advanced GI specialist as scope could not be advanced in prior EGD at  4/6. Pt denies dysphagia, described ongoing pain of esophagus.     #Esophageal stricture     #CKD     #Constipation Patient seen and examined, care plan discussed with house staff as above.    61yoM w hx of chronic pancreatitis (2/2 ?remote etoh use), CKD Stage IV, HCV (s/p 8 weeks treatment, unclear when, unable to confirm), hx of gastric perf w peritonitis and MSSA bacteremia in 2019 requiring ex lap and prolonged hospitalization, 100 lb weight loss unintentionally, transferred from  with esophageal stricture w intractable hiccups (now improved) and epigastric pain (persists); in need of advanced GI specialist as scope could not be advanced in prior EGD at  4/6. Pt denies dysphagia, described ongoing pain of esophagus.     #Esophageal stricture, pt with esophageal stricture found at OSH. At [facility] for advanced GI for endoscopy on Monday. Pt with chest pain 2/2 stricture. Pain management consulted. Recommending oral oxycodone. Patient is tolerating PO currently.     #CKD, at baseline     #Constipation, self disimpacting. C/w bowel regimen.     Plan discussed with HS.

## 2022-04-16 NOTE — PROGRESS NOTE ADULT - PROBLEM SELECTOR PLAN 1
Patient found to have esophageal stricture 35cm from incisure, unable to pass EGD scope on 4/8. Transferred to [facility] for advanced GI eval.  -per advanced GI, f/u infectious workup prior to intervention   -Aspiration precautions, head of bed elevation  -patient refused pureed diet, states he has been tolerating regular texture diet; does not feel food gets stuck in throat, counseled on risk of aspiration, patient amenable to soft and bite sized diet   -concern for extrinsic compression given CT chest in December 2021 with fullness around esophogeal junction, per GI now attributing compression to large stool burden? Follow GI recs, so far not recommending chest imaging; iso CKD, hold off IV contrast for now Patient found to have esophageal stricture 35cm from incisure, unable to pass EGD scope on 4/8. Transferred to [facility] for advanced GI eval.  -per advanced GI, f/u infectious workup prior to intervention   -Aspiration precautions, head of bed elevation  -patient refused pureed diet, states he has been tolerating regular texture diet; does not feel food gets stuck in throat, counseled on risk of aspiration, patient amenable to soft and bite sized diet which he is tolerating well  -concern for extrinsic compression given CT chest in December 2021 with fullness around esophogeal junction, per GI now attributing compression to large stool burden? Follow GI recs, so far not recommending chest imaging; iso CKD, hold off IV contrast for now

## 2022-04-16 NOTE — DIETITIAN INITIAL EVALUATION ADULT - ADD RECOMMEND
1. Monitor weights, labs, BM's, skin integrity, p.o. intake. 2. Honor food and beverage preferences within diet restriction of patient in an effort to maximize level of nutrient intake

## 2022-04-16 NOTE — DIETITIAN INITIAL EVALUATION ADULT - OTHER INFO
62 y/o male with medical history of remote ETOH use, chronic pancreatitis, esophagitis, HCV, CKD4, initially presented [facility] with abdominal pain found to have esophageal stricture on EGD transferred to [facility] for advanced GI evaluation per chart review. Patient reports good appetite and PO intake in-house. Patient denies any nausea/vomiting/diarrhea/constipation. Per chart review, patient refused modified diet texture (pureed and soft) and preferred regular diet despite education provided by medical team. Patient remains on regular diet per request. Denies any chewing or swallowing difficulty. Importance of having small frequent po intak e of nutrient and protein dense food encouraged.

## 2022-04-16 NOTE — DIETITIAN INITIAL EVALUATION ADULT - ORAL NUTRITION SUPPLEMENTS
Recommend switch PO supplement to Ensure Enlive 240mls 3x daily (1050kcal, 60g protein) and d/c Nepro given Phos and Potassium and restriction not warranted.

## 2022-04-17 PROCEDURE — 99233 SBSQ HOSP IP/OBS HIGH 50: CPT | Mod: GC

## 2022-04-17 RX ADMIN — Medication 10 MILLIGRAM(S): at 02:00

## 2022-04-17 RX ADMIN — PANTOPRAZOLE SODIUM 40 MILLIGRAM(S): 20 TABLET, DELAYED RELEASE ORAL at 19:30

## 2022-04-17 RX ADMIN — POLYETHYLENE GLYCOL 3350 17 GRAM(S): 17 POWDER, FOR SOLUTION ORAL at 19:30

## 2022-04-17 RX ADMIN — HEPARIN SODIUM 5000 UNIT(S): 5000 INJECTION INTRAVENOUS; SUBCUTANEOUS at 06:18

## 2022-04-17 RX ADMIN — OXYCODONE HYDROCHLORIDE 10 MILLIGRAM(S): 5 TABLET ORAL at 19:28

## 2022-04-17 RX ADMIN — Medication 10 MILLIGRAM(S): at 10:17

## 2022-04-17 RX ADMIN — CYCLOBENZAPRINE HYDROCHLORIDE 5 MILLIGRAM(S): 10 TABLET, FILM COATED ORAL at 12:41

## 2022-04-17 RX ADMIN — Medication 1 GRAM(S): at 19:29

## 2022-04-17 RX ADMIN — HEPARIN SODIUM 5000 UNIT(S): 5000 INJECTION INTRAVENOUS; SUBCUTANEOUS at 14:36

## 2022-04-17 RX ADMIN — Medication 2 CAPSULE(S): at 19:29

## 2022-04-17 RX ADMIN — Medication 1 MILLIGRAM(S): at 12:42

## 2022-04-17 RX ADMIN — OXYCODONE HYDROCHLORIDE 10 MILLIGRAM(S): 5 TABLET ORAL at 15:05

## 2022-04-17 RX ADMIN — OXYCODONE HYDROCHLORIDE 10 MILLIGRAM(S): 5 TABLET ORAL at 20:00

## 2022-04-17 RX ADMIN — POLYETHYLENE GLYCOL 3350 17 GRAM(S): 17 POWDER, FOR SOLUTION ORAL at 06:18

## 2022-04-17 RX ADMIN — Medication 1 GRAM(S): at 00:00

## 2022-04-17 RX ADMIN — HEPARIN SODIUM 5000 UNIT(S): 5000 INJECTION INTRAVENOUS; SUBCUTANEOUS at 22:39

## 2022-04-17 RX ADMIN — Medication 100 MILLIGRAM(S): at 12:43

## 2022-04-17 RX ADMIN — OXYCODONE HYDROCHLORIDE 10 MILLIGRAM(S): 5 TABLET ORAL at 06:26

## 2022-04-17 RX ADMIN — Medication 2 CAPSULE(S): at 10:16

## 2022-04-17 RX ADMIN — OXYCODONE HYDROCHLORIDE 10 MILLIGRAM(S): 5 TABLET ORAL at 14:35

## 2022-04-17 RX ADMIN — GABAPENTIN 200 MILLIGRAM(S): 400 CAPSULE ORAL at 22:40

## 2022-04-17 RX ADMIN — Medication 1 GRAM(S): at 06:17

## 2022-04-17 RX ADMIN — Medication 650 MILLIGRAM(S): at 01:00

## 2022-04-17 RX ADMIN — PANTOPRAZOLE SODIUM 40 MILLIGRAM(S): 20 TABLET, DELAYED RELEASE ORAL at 06:18

## 2022-04-17 RX ADMIN — Medication 3 MILLIGRAM(S): at 22:40

## 2022-04-17 RX ADMIN — Medication 2 CAPSULE(S): at 14:36

## 2022-04-17 RX ADMIN — Medication 650 MILLIGRAM(S): at 00:00

## 2022-04-17 RX ADMIN — TAMSULOSIN HYDROCHLORIDE 0.4 MILLIGRAM(S): 0.4 CAPSULE ORAL at 22:40

## 2022-04-17 RX ADMIN — Medication 10 MILLIGRAM(S): at 20:34

## 2022-04-17 RX ADMIN — Medication 1 GRAM(S): at 12:40

## 2022-04-17 RX ADMIN — Medication 1 TABLET(S): at 12:41

## 2022-04-17 RX ADMIN — OXYCODONE HYDROCHLORIDE 10 MILLIGRAM(S): 5 TABLET ORAL at 07:26

## 2022-04-17 NOTE — PROGRESS NOTE ADULT - ATTENDING COMMENTS
Patient seen and examined, care plan discussed with house staff as above.    61yoM w hx of chronic pancreatitis (2/2 ?remote etoh use), CKD Stage IV, HCV (s/p 8 weeks treatment, unclear when, unable to confirm), hx of gastric perf w peritonitis and MSSA bacteremia in 2019 requiring ex lap and prolonged hospitalization, 100 lb weight loss unintentionally, transferred from  with esophageal stricture w intractable hiccups (now improved) and epigastric pain (persists); in need of advanced GI specialist as scope could not be advanced in prior EGD at  4/6. Pt denies dysphagia, described ongoing pain of esophagus.     #Esophageal stricture, pt with esophageal stricture found at OSH. At [facility] for advanced GI for endoscopy on Monday. Pt with chest pain 2/2 stricture. Pain management consulted. Recommending oral oxycodone. Patient is tolerating PO currently.     #CKD, at baseline     #Constipation, self disimpacting. C/w bowel regimen.     Plan discussed with HS. Patient seen and examined, care plan discussed with house staff as above.    61yoM w hx of chronic pancreatitis (2/2 ?remote etoh use), CKD Stage IV, HCV (s/p 8 weeks treatment, unclear when, unable to confirm), hx of gastric perf w peritonitis and MSSA bacteremia in 2019 requiring ex lap and prolonged hospitalization, 100 lb weight loss unintentionally, transferred from  with esophageal stricture w intractable hiccups (now improved) and epigastric pain (persists); in need of advanced GI specialist as scope could not be advanced in prior EGD at  4/6. Pt denies dysphagia, described ongoing pain of esophagus.     #Esophageal stricture, pt with esophageal stricture found at OSH. At [facility] for advanced GI for endoscopy on Monday. Pt with chest pain 2/2 stricture. Pain management consulted. Recommending oral oxycodone. Patient is tolerating PO currently. Given concern for malignancy - pending repeat MRI abdomen.     #CKD, at baseline     #Constipation, self disimpacting. C/w bowel regimen.     Plan discussed with HS.

## 2022-04-17 NOTE — PROGRESS NOTE ADULT - PROBLEM SELECTOR PLAN 2
Patient with fever on admission to 39.3 with tachycardia. Unclear infectious source, no leukocytosis. S/p 1 dose of zosyn. Consider tumor fever,.   -blood cxs: NGTD  -UA/urine culture negative  -AP CXR without focal consolidation  -Monitor off further antibiotics unless fever recurs

## 2022-04-17 NOTE — PROGRESS NOTE ADULT - ASSESSMENT
60 yo M with hx of remote ETOH use, chronic pancreatitis, esophagitis, HCV s/p treatment, CKD4, initially presented to [facility] with abdominal pain found to have esophageal stricture on EGD transferred to [facility] for advanced GI evaluation.

## 2022-04-17 NOTE — PROGRESS NOTE ADULT - PROBLEM SELECTOR PLAN 3
pt reports 10/10 chest pain - likely in the setting of esophagitis/esophageal stricture with extrinsic compression   -low suspicion for cardiac etiology - EKG: without ischemic changes, troponin negative   -pt refusing dilaudid/tylenol, requesting morphine - however given YUNIEL, not a candidate  - pain management rec oxy 10mg q4h prn, tylenol 650mg ATC x2 days, gabapentin renally dosed, and flexeril prn  - Reglan 10mg TID switched to thorazine 10mg TID for intractable hiccups 4/16

## 2022-04-17 NOTE — PROGRESS NOTE ADULT - SUBJECTIVE AND OBJECTIVE BOX
Authored by Neris Galaviz MD, PGY2  PATIENT:  IMELDA ROBERTSON  [id number]    CHIEF COMPLAINT:  Patient is a 61y old  Male who presents with a chief complaint of Digestive system disorder     (2022 14:52)      INTERVAL HISTORY OVERNIGHT EVENTS: YOSSI overnight. Yesterday thorazine started for hiccups.       MEDICATIONS:  MEDICATIONS  (STANDING):  acetaminophen     Tablet .. 650 milliGRAM(s) Oral every 6 hours  bisacodyl Suppository 10 milliGRAM(s) Rectal daily  chlorproMAZINE    Tablet 10 milliGRAM(s) Oral every 8 hours  folic acid 1 milliGRAM(s) Oral daily  gabapentin Solution 200 milliGRAM(s) Oral at bedtime  heparin   Injectable 5000 Unit(s) SubCutaneous every 8 hours  lactated ringers. 500 milliLiter(s) (500 mL/Hr) IV Continuous <Continuous>  lactated ringers. 500 milliLiter(s) (500 mL/Hr) IV Continuous <Continuous>  multivitamin 1 Tablet(s) Oral daily  pancrelipase  (CREON  6,000 Lipase Units) 2 Capsule(s) Oral three times a day with meals  pantoprazole  Injectable 40 milliGRAM(s) IV Push two times a day  polyethylene glycol 3350 17 Gram(s) Oral two times a day  senna 2 Tablet(s) Oral at bedtime  sucralfate 1 Gram(s) Oral every 6 hours  tamsulosin 0.4 milliGRAM(s) Oral at bedtime  thiamine 100 milliGRAM(s) Oral daily    MEDICATIONS  (PRN):  cyclobenzaprine 5 milliGRAM(s) Oral three times a day PRN Muscle Spasm  LORazepam   Injectable 1 milliGRAM(s) IV Push once PRN Anxiety  melatonin 3 milliGRAM(s) Oral at bedtime PRN Insomnia  oxyCODONE    Solution 10 milliGRAM(s) Oral every 4 hours PRN Severe Pain (7 - 10)      ALLERGIES:  Allergies    No Known Allergies    Intolerances        OBJECTIVE:  ICU Vital Signs Last 24 Hrs  T(C): 37.4 (2022 06:23), Max: 37.4 (2022 06:23)  T(F): 99.3 (2022 06:23), Max: 99.3 (2022 06:23)  HR: 84 (2022 06:23) (77 - 88)  BP: 153/76 (2022 06:23) (140/82 - 167/71)  BP(mean): --  ABP: --  ABP(mean): --  RR: 17 (2022 06:23) (17 - 18)  SpO2: 100% (2022 06:23) (99% - 100%)            I&O's Summary    2022 07:01  -  2022 07:00  --------------------------------------------------------  IN: 230 mL / OUT: 450 mL / NET: -220 mL        PHYSICAL EXAMINATION:  GENERAL: Frail thin man lying in bed  HEAD:  Atraumatic, Normocephalic  EYES: EOMI, PERRLA, conjunctiva and sclera clear  ENT: Moist mucous membranes  NECK: Supple, No JVD  CHEST/LUNG: Clear to auscultation bilaterally; No rales, rhonchi, wheezing, or rubs. Unlabored respirations  HEART: Regular rate and rhythm; No murmurs, rubs, or gallops  ABDOMEN: BSx4; Soft, tender to palpation in epigastrium, no guarding, hiccuping  EXTREMITIES:  1+ Peripheral Pulses, brisk capillary refill. long toenails  NERVOUS SYSTEM:  A&Ox3, no focal deficits   SKIN: No rashes or lesions    LABS:                          10.0   4.91  )-----------( 258      ( 2022 08:22 )             32.1     04-16    143  |  110<H>  |  31<H>  ----------------------------<  105<H>  3.3<L>   |  20<L>  |  2.72<H>    Ca    8.7      2022 08:22  Phos  2.3     04-16  Mg     2.30     04-16    TPro  6.2  /  Alb  3.2<L>  /  TBili  <0.2  /  DBili  x   /  AST  20  /  ALT  24  /  AlkPhos  89  04-16    LIVER FUNCTIONS - ( 2022 08:22 )  Alb: 3.2 g/dL / Pro: 6.2 g/dL / ALK PHOS: 89 U/L / ALT: 24 U/L / AST: 20 U/L / GGT: x                   Urinalysis Basic - ( 15 Apr 2022 23:30 )    Color: Yellow / Appearance: Clear / S.024 / pH: x  Gluc: x / Ketone: Negative  / Bili: Negative / Urobili: <2 mg/dL   Blood: x / Protein: 100 mg/dL / Nitrite: Negative   Leuk Esterase: Negative / RBC: 8 /HPF / WBC 4 /HPF   Sq Epi: x / Non Sq Epi: 3 /HPF / Bacteria: Negative        TELEMETRY:     EKG:     IMAGING:

## 2022-04-17 NOTE — PROGRESS NOTE ADULT - PROBLEM SELECTOR PLAN 1
Patient found to have esophageal stricture 35cm from incisure, unable to pass EGD scope on 4/8. Transferred to [facility] for advanced GI eval.  -per advanced GI, f/u infectious workup prior to intervention   -Aspiration precautions, head of bed elevation  -patient refused pureed diet, states he has been tolerating regular texture diet; does not feel food gets stuck in throat, counseled on risk of aspiration, patient amenable to soft and bite sized diet which he is tolerating well  -concern for extrinsic compression given CT chest in December 2021 with fullness around esophogeal junction, per GI now attributing compression to large stool burden? Follow GI recs, now recommending repeat imaging, pending MRI abdomen with IV contrast

## 2022-04-18 LAB
ALBUMIN SERPL ELPH-MCNC: 3 G/DL — LOW (ref 3.3–5)
ALP SERPL-CCNC: 77 U/L — SIGNIFICANT CHANGE UP (ref 40–120)
ALT FLD-CCNC: 13 U/L — SIGNIFICANT CHANGE UP (ref 4–41)
ANION GAP SERPL CALC-SCNC: 10 MMOL/L — SIGNIFICANT CHANGE UP (ref 7–14)
AST SERPL-CCNC: 8 U/L — SIGNIFICANT CHANGE UP (ref 4–40)
BASOPHILS # BLD AUTO: 0.04 K/UL — SIGNIFICANT CHANGE UP (ref 0–0.2)
BASOPHILS NFR BLD AUTO: 0.4 % — SIGNIFICANT CHANGE UP (ref 0–2)
BILIRUB SERPL-MCNC: <0.2 MG/DL — SIGNIFICANT CHANGE UP (ref 0.2–1.2)
BUN SERPL-MCNC: 27 MG/DL — HIGH (ref 7–23)
CALCIUM SERPL-MCNC: 8.8 MG/DL — SIGNIFICANT CHANGE UP (ref 8.4–10.5)
CHLORIDE SERPL-SCNC: 116 MMOL/L — HIGH (ref 98–107)
CO2 SERPL-SCNC: 20 MMOL/L — LOW (ref 22–31)
CREAT SERPL-MCNC: 2.88 MG/DL — HIGH (ref 0.5–1.3)
EGFR: 24 ML/MIN/1.73M2 — LOW
EOSINOPHIL # BLD AUTO: 0.01 K/UL — SIGNIFICANT CHANGE UP (ref 0–0.5)
EOSINOPHIL NFR BLD AUTO: 0.1 % — SIGNIFICANT CHANGE UP (ref 0–6)
GLUCOSE SERPL-MCNC: 92 MG/DL — SIGNIFICANT CHANGE UP (ref 70–99)
HCT VFR BLD CALC: 31.1 % — LOW (ref 39–50)
HGB BLD-MCNC: 9.6 G/DL — LOW (ref 13–17)
IANC: 6.48 K/UL — SIGNIFICANT CHANGE UP (ref 1.8–7.4)
IMM GRANULOCYTES NFR BLD AUTO: 0.3 % — SIGNIFICANT CHANGE UP (ref 0–1.5)
LYMPHOCYTES # BLD AUTO: 1.91 K/UL — SIGNIFICANT CHANGE UP (ref 1–3.3)
LYMPHOCYTES # BLD AUTO: 21.4 % — SIGNIFICANT CHANGE UP (ref 13–44)
MAGNESIUM SERPL-MCNC: 2.1 MG/DL — SIGNIFICANT CHANGE UP (ref 1.6–2.6)
MCHC RBC-ENTMCNC: 28.4 PG — SIGNIFICANT CHANGE UP (ref 27–34)
MCHC RBC-ENTMCNC: 30.9 GM/DL — LOW (ref 32–36)
MCV RBC AUTO: 92 FL — SIGNIFICANT CHANGE UP (ref 80–100)
MONOCYTES # BLD AUTO: 0.44 K/UL — SIGNIFICANT CHANGE UP (ref 0–0.9)
MONOCYTES NFR BLD AUTO: 4.9 % — SIGNIFICANT CHANGE UP (ref 2–14)
NEUTROPHILS # BLD AUTO: 6.48 K/UL — SIGNIFICANT CHANGE UP (ref 1.8–7.4)
NEUTROPHILS NFR BLD AUTO: 72.9 % — SIGNIFICANT CHANGE UP (ref 43–77)
NRBC # BLD: 0 /100 WBCS — SIGNIFICANT CHANGE UP
NRBC # FLD: 0 K/UL — SIGNIFICANT CHANGE UP
PHOSPHATE SERPL-MCNC: 2.5 MG/DL — SIGNIFICANT CHANGE UP (ref 2.5–4.5)
PLATELET # BLD AUTO: 280 K/UL — SIGNIFICANT CHANGE UP (ref 150–400)
POTASSIUM SERPL-MCNC: 3.4 MMOL/L — LOW (ref 3.5–5.3)
POTASSIUM SERPL-SCNC: 3.4 MMOL/L — LOW (ref 3.5–5.3)
PROT SERPL-MCNC: 5.8 G/DL — LOW (ref 6–8.3)
RBC # BLD: 3.38 M/UL — LOW (ref 4.2–5.8)
RBC # FLD: 17.9 % — HIGH (ref 10.3–14.5)
SARS-COV-2 RNA SPEC QL NAA+PROBE: SIGNIFICANT CHANGE UP
SODIUM SERPL-SCNC: 146 MMOL/L — HIGH (ref 135–145)
WBC # BLD: 8.91 K/UL — SIGNIFICANT CHANGE UP (ref 3.8–10.5)
WBC # FLD AUTO: 8.91 K/UL — SIGNIFICANT CHANGE UP (ref 3.8–10.5)

## 2022-04-18 PROCEDURE — 99233 SBSQ HOSP IP/OBS HIGH 50: CPT

## 2022-04-18 PROCEDURE — 99232 SBSQ HOSP IP/OBS MODERATE 35: CPT | Mod: GC

## 2022-04-18 RX ORDER — SODIUM CHLORIDE 9 MG/ML
1000 INJECTION, SOLUTION INTRAVENOUS
Refills: 0 | Status: COMPLETED | OUTPATIENT
Start: 2022-04-18 | End: 2022-04-18

## 2022-04-18 RX ORDER — POTASSIUM CHLORIDE 20 MEQ
40 PACKET (EA) ORAL ONCE
Refills: 0 | Status: DISCONTINUED | OUTPATIENT
Start: 2022-04-18 | End: 2022-04-18

## 2022-04-18 RX ORDER — SODIUM CHLORIDE 9 MG/ML
500 INJECTION, SOLUTION INTRAVENOUS
Refills: 0 | Status: DISCONTINUED | OUTPATIENT
Start: 2022-04-18 | End: 2022-04-20

## 2022-04-18 RX ORDER — METOCLOPRAMIDE HCL 10 MG
5 TABLET ORAL EVERY 8 HOURS
Refills: 0 | Status: DISCONTINUED | OUTPATIENT
Start: 2022-04-19 | End: 2022-04-20

## 2022-04-18 RX ORDER — POTASSIUM CHLORIDE 20 MEQ
10 PACKET (EA) ORAL
Refills: 0 | Status: DISCONTINUED | OUTPATIENT
Start: 2022-04-18 | End: 2022-04-21

## 2022-04-18 RX ORDER — SODIUM CHLORIDE 9 MG/ML
500 INJECTION, SOLUTION INTRAVENOUS
Refills: 0 | Status: DISCONTINUED | OUTPATIENT
Start: 2022-04-18 | End: 2022-04-18

## 2022-04-18 RX ORDER — MUPIROCIN 20 MG/G
1 OINTMENT TOPICAL
Refills: 0 | Status: COMPLETED | OUTPATIENT
Start: 2022-04-18 | End: 2022-04-23

## 2022-04-18 RX ADMIN — OXYCODONE HYDROCHLORIDE 10 MILLIGRAM(S): 5 TABLET ORAL at 12:10

## 2022-04-18 RX ADMIN — Medication 1 GRAM(S): at 05:38

## 2022-04-18 RX ADMIN — TAMSULOSIN HYDROCHLORIDE 0.4 MILLIGRAM(S): 0.4 CAPSULE ORAL at 22:46

## 2022-04-18 RX ADMIN — PANTOPRAZOLE SODIUM 40 MILLIGRAM(S): 20 TABLET, DELAYED RELEASE ORAL at 19:10

## 2022-04-18 RX ADMIN — Medication 1 GRAM(S): at 19:10

## 2022-04-18 RX ADMIN — Medication 100 MILLIEQUIVALENT(S): at 12:35

## 2022-04-18 RX ADMIN — SENNA PLUS 2 TABLET(S): 8.6 TABLET ORAL at 21:26

## 2022-04-18 RX ADMIN — OXYCODONE HYDROCHLORIDE 10 MILLIGRAM(S): 5 TABLET ORAL at 01:20

## 2022-04-18 RX ADMIN — Medication 2 CAPSULE(S): at 19:10

## 2022-04-18 RX ADMIN — Medication 10 MILLIGRAM(S): at 11:47

## 2022-04-18 RX ADMIN — OXYCODONE HYDROCHLORIDE 10 MILLIGRAM(S): 5 TABLET ORAL at 21:26

## 2022-04-18 RX ADMIN — OXYCODONE HYDROCHLORIDE 10 MILLIGRAM(S): 5 TABLET ORAL at 21:58

## 2022-04-18 RX ADMIN — POLYETHYLENE GLYCOL 3350 17 GRAM(S): 17 POWDER, FOR SOLUTION ORAL at 19:09

## 2022-04-18 RX ADMIN — OXYCODONE HYDROCHLORIDE 10 MILLIGRAM(S): 5 TABLET ORAL at 11:33

## 2022-04-18 RX ADMIN — GABAPENTIN 200 MILLIGRAM(S): 400 CAPSULE ORAL at 21:27

## 2022-04-18 RX ADMIN — HEPARIN SODIUM 5000 UNIT(S): 5000 INJECTION INTRAVENOUS; SUBCUTANEOUS at 14:10

## 2022-04-18 RX ADMIN — Medication 100 MILLIEQUIVALENT(S): at 11:13

## 2022-04-18 RX ADMIN — OXYCODONE HYDROCHLORIDE 10 MILLIGRAM(S): 5 TABLET ORAL at 16:30

## 2022-04-18 RX ADMIN — POLYETHYLENE GLYCOL 3350 17 GRAM(S): 17 POWDER, FOR SOLUTION ORAL at 05:39

## 2022-04-18 RX ADMIN — PANTOPRAZOLE SODIUM 40 MILLIGRAM(S): 20 TABLET, DELAYED RELEASE ORAL at 05:39

## 2022-04-18 RX ADMIN — HEPARIN SODIUM 5000 UNIT(S): 5000 INJECTION INTRAVENOUS; SUBCUTANEOUS at 05:39

## 2022-04-18 RX ADMIN — OXYCODONE HYDROCHLORIDE 10 MILLIGRAM(S): 5 TABLET ORAL at 00:20

## 2022-04-18 RX ADMIN — HEPARIN SODIUM 5000 UNIT(S): 5000 INJECTION INTRAVENOUS; SUBCUTANEOUS at 21:26

## 2022-04-18 RX ADMIN — MUPIROCIN 1 APPLICATION(S): 20 OINTMENT TOPICAL at 19:10

## 2022-04-18 RX ADMIN — Medication 1 MILLIGRAM(S): at 18:34

## 2022-04-18 RX ADMIN — Medication 10 MILLIGRAM(S): at 03:50

## 2022-04-18 RX ADMIN — OXYCODONE HYDROCHLORIDE 10 MILLIGRAM(S): 5 TABLET ORAL at 15:46

## 2022-04-18 RX ADMIN — Medication 1 GRAM(S): at 00:20

## 2022-04-18 NOTE — PROGRESS NOTE ADULT - ATTENDING COMMENTS
Patient seen and examined with the GI fellow. I agree with the above assessment and plan. Thank you for allowing us to care for your patient.    Plan for EGD dilation vs stent tomorrow.

## 2022-04-18 NOTE — PROGRESS NOTE ADULT - ASSESSMENT
61M with history of chronic pancreatitis, HCV, CKD, hx of esophagitis, presenting w/ abd pain, transferred for evaluation of esophageal stricture.    Impression:  #Esophageal stricture w/ dysphagia - Likely benign peptic stricture given long-standing severe esophagitis.  #Abd pain - likely 2/2 chronic pancreatitis. Unrelated to stricture. Exam is notable for severe TTP to light skin touch. Possible neuropathic component.  #HCV s/p treatment (neg viral load)  #CKD  #Fever    Recommendations:  - EGD w/ dilation vs stenting Tuesday, 4/19.  - F/u pain mgmt recs.  - Diet as tolerated for now.  - NPO @ [facility].  - Please obtain COVID swab today for procedure.    Advanced GI will continue to follow.     Recommendations preliminary until signed by attending.     Panda Clarke  Gastroenterology/Hepatology Fellow  Available via Microsoft Teams    NON-URGENT CONSULTS:  Please email [email or web address]  AT NIGHT AND ON WEEKENDS:  Contact on-call GI fellow via answering service ([phone]) from 5pm-8am and on weekends/holidays  MONDAY-FRIDAY 8AM-5PM:  Pager# [phone] ([facility]) or [phone] ([facility])  GI Phone# [phone] ([facility])

## 2022-04-18 NOTE — PROGRESS NOTE ADULT - ASSESSMENT
62 yo M with hx of remote ETOH use, chronic pancreatitis, esophagitis, HCV s/p treatment, CKD4, initially presented to [facility] with abdominal pain found to have esophageal stricture on EGD transferred to [facility] for advanced GI evaluation.

## 2022-04-18 NOTE — PROGRESS NOTE ADULT - SUBJECTIVE AND OBJECTIVE BOX
Authored by Neris Galaviz MD, PGY2  PATIENT:  IMELDA ROBERTSON  [id number]    CHIEF COMPLAINT:  Patient is a 61y old  Male who presents with a chief complaint of Esophageal stricture (17 Apr 2022 08:13)      INTERVAL HISTORY OVERNIGHT EVENTS:        MEDICATIONS:  MEDICATIONS  (STANDING):  bisacodyl Suppository 10 milliGRAM(s) Rectal daily  chlorproMAZINE    Tablet 10 milliGRAM(s) Oral every 8 hours  folic acid 1 milliGRAM(s) Oral daily  gabapentin Solution 200 milliGRAM(s) Oral at bedtime  heparin   Injectable 5000 Unit(s) SubCutaneous every 8 hours  lactated ringers. 500 milliLiter(s) (500 mL/Hr) IV Continuous <Continuous>  lactated ringers. 500 milliLiter(s) (500 mL/Hr) IV Continuous <Continuous>  multivitamin 1 Tablet(s) Oral daily  pancrelipase  (CREON  6,000 Lipase Units) 2 Capsule(s) Oral three times a day with meals  pantoprazole  Injectable 40 milliGRAM(s) IV Push two times a day  polyethylene glycol 3350 17 Gram(s) Oral two times a day  senna 2 Tablet(s) Oral at bedtime  sucralfate 1 Gram(s) Oral every 6 hours  tamsulosin 0.4 milliGRAM(s) Oral at bedtime  thiamine 100 milliGRAM(s) Oral daily    MEDICATIONS  (PRN):  cyclobenzaprine 5 milliGRAM(s) Oral three times a day PRN Muscle Spasm  LORazepam   Injectable 1 milliGRAM(s) IV Push once PRN Anxiety  melatonin 3 milliGRAM(s) Oral at bedtime PRN Insomnia  oxyCODONE    Solution 10 milliGRAM(s) Oral every 4 hours PRN Severe Pain (7 - 10)      ALLERGIES:  Allergies    No Known Allergies    Intolerances        OBJECTIVE:  ICU Vital Signs Last 24 Hrs  T(C): 36.6 (18 Apr 2022 05:46), Max: 37.1 (17 Apr 2022 16:09)  T(F): 97.8 (18 Apr 2022 05:46), Max: 98.8 (17 Apr 2022 16:09)  HR: 74 (18 Apr 2022 05:46) (74 - 85)  BP: 113/62 (18 Apr 2022 05:46) (112/70 - 118/61)  BP(mean): --  ABP: --  ABP(mean): --  RR: 17 (18 Apr 2022 05:46) (17 - 18)  SpO2: 100% (18 Apr 2022 05:46) (100% - 100%)        PHYSICAL EXAMINATION:  GENERAL: Frail thin man lying in bed  HEAD:  Atraumatic, Normocephalic  EYES: EOMI, PERRLA, conjunctiva and sclera clear  ENT: Moist mucous membranes  NECK: Supple, No JVD  CHEST/LUNG: Clear to auscultation bilaterally; No rales, rhonchi, wheezing, or rubs. Unlabored respirations  HEART: Regular rate and rhythm; No murmurs, rubs, or gallops  ABDOMEN: BSx4; Soft, tender to palpation in epigastrium, no guarding, hiccuping  EXTREMITIES:  1+ Peripheral Pulses, brisk capillary refill. long toenails  NERVOUS SYSTEM:  A&Ox3, no focal deficits   SKIN: No rashes or lesions      LABS:                          10.0   4.91  )-----------( 258      ( 16 Apr 2022 08:22 )             32.1     04-16    143  |  110<H>  |  31<H>  ----------------------------<  105<H>  3.3<L>   |  20<L>  |  2.72<H>    Ca    8.7      16 Apr 2022 08:22  Phos  2.3     04-16  Mg     2.30     04-16    TPro  6.2  /  Alb  3.2<L>  /  TBili  <0.2  /  DBili  x   /  AST  20  /  ALT  24  /  AlkPhos  89  04-16    LIVER FUNCTIONS - ( 16 Apr 2022 08:22 )  Alb: 3.2 g/dL / Pro: 6.2 g/dL / ALK PHOS: 89 U/L / ALT: 24 U/L / AST: 20 U/L / GGT: x                       TELEMETRY:     EKG:     IMAGING:       Authored by Neris Galaviz MD, PGY2  PATIENT:  IMELDA ROBERTSON  [id number]    CHIEF COMPLAINT:  Patient is a 61y old  Male who presents with a chief complaint of Esophageal stricture (17 Apr 2022 08:13)      INTERVAL HISTORY OVERNIGHT EVENTS: Patient endorses continued epigastric pain and hiccuping, endorses good appetite, tolerating diet.         MEDICATIONS:  MEDICATIONS  (STANDING):  bisacodyl Suppository 10 milliGRAM(s) Rectal daily  chlorproMAZINE    Tablet 10 milliGRAM(s) Oral every 8 hours  folic acid 1 milliGRAM(s) Oral daily  gabapentin Solution 200 milliGRAM(s) Oral at bedtime  heparin   Injectable 5000 Unit(s) SubCutaneous every 8 hours  lactated ringers. 500 milliLiter(s) (500 mL/Hr) IV Continuous <Continuous>  lactated ringers. 500 milliLiter(s) (500 mL/Hr) IV Continuous <Continuous>  multivitamin 1 Tablet(s) Oral daily  pancrelipase  (CREON  6,000 Lipase Units) 2 Capsule(s) Oral three times a day with meals  pantoprazole  Injectable 40 milliGRAM(s) IV Push two times a day  polyethylene glycol 3350 17 Gram(s) Oral two times a day  senna 2 Tablet(s) Oral at bedtime  sucralfate 1 Gram(s) Oral every 6 hours  tamsulosin 0.4 milliGRAM(s) Oral at bedtime  thiamine 100 milliGRAM(s) Oral daily    MEDICATIONS  (PRN):  cyclobenzaprine 5 milliGRAM(s) Oral three times a day PRN Muscle Spasm  LORazepam   Injectable 1 milliGRAM(s) IV Push once PRN Anxiety  melatonin 3 milliGRAM(s) Oral at bedtime PRN Insomnia  oxyCODONE    Solution 10 milliGRAM(s) Oral every 4 hours PRN Severe Pain (7 - 10)      ALLERGIES:  Allergies    No Known Allergies    Intolerances        OBJECTIVE:  ICU Vital Signs Last 24 Hrs  T(C): 36.6 (18 Apr 2022 05:46), Max: 37.1 (17 Apr 2022 16:09)  T(F): 97.8 (18 Apr 2022 05:46), Max: 98.8 (17 Apr 2022 16:09)  HR: 74 (18 Apr 2022 05:46) (74 - 85)  BP: 113/62 (18 Apr 2022 05:46) (112/70 - 118/61)  BP(mean): --  ABP: --  ABP(mean): --  RR: 17 (18 Apr 2022 05:46) (17 - 18)  SpO2: 100% (18 Apr 2022 05:46) (100% - 100%)        PHYSICAL EXAMINATION:  GENERAL: Frail thin man lying in bed  HEAD:  Atraumatic, Normocephalic  EYES: EOMI, PERRLA, conjunctiva and sclera clear  ENT: Moist mucous membranes  NECK: Supple, No JVD  CHEST/LUNG: Clear to auscultation bilaterally; No rales, rhonchi, wheezing, or rubs. Unlabored respirations  HEART: Regular rate and rhythm; No murmurs, rubs, or gallops  ABDOMEN: BSx4; Soft, tender to palpation in epigastrium, no guarding, hiccuping  EXTREMITIES:  1+ Peripheral Pulses, brisk capillary refill. long toenails  NERVOUS SYSTEM:  A&Ox3, no focal deficits   SKIN: No rashes or lesions      LABS:                          10.0   4.91  )-----------( 258      ( 16 Apr 2022 08:22 )             32.1     04-16    143  |  110<H>  |  31<H>  ----------------------------<  105<H>  3.3<L>   |  20<L>  |  2.72<H>    Ca    8.7      16 Apr 2022 08:22  Phos  2.3     04-16  Mg     2.30     04-16    TPro  6.2  /  Alb  3.2<L>  /  TBili  <0.2  /  DBili  x   /  AST  20  /  ALT  24  /  AlkPhos  89  04-16    LIVER FUNCTIONS - ( 16 Apr 2022 08:22 )  Alb: 3.2 g/dL / Pro: 6.2 g/dL / ALK PHOS: 89 U/L / ALT: 24 U/L / AST: 20 U/L / GGT: x                       TELEMETRY:     EKG:     IMAGING:

## 2022-04-18 NOTE — PROGRESS NOTE ADULT - PROBLEM SELECTOR PLAN 4
Patient with large stool burden, unable to pass stool without manual extraction  -c/w senna, Miralax, dulcolax suppository, enemas prn

## 2022-04-18 NOTE — PROGRESS NOTE ADULT - PROBLEM SELECTOR PLAN 3
pt reports 10/10 chest pain - likely in the setting of esophagitis/esophageal stricture with extrinsic compression   -low suspicion for cardiac etiology - EKG: without ischemic changes, troponin negative   -pt refusing dilaudid/tylenol, requesting morphine - however given CKD, not a candidate  - pain management rec oxy 10mg q4h prn, tylenol 650mg ATC x2 days, gabapentin renally dosed, and flexeril prn  - Reglan 10mg TID switched to thorazine 10mg TID for intractable hiccups 4/16 pt reports 10/10 chest pain - likely in the setting of esophagitis/esophageal stricture with extrinsic compression   -low suspicion for cardiac etiology - EKG: without ischemic changes, troponin negative   -pt refusing dilaudid/tylenol, requesting morphine - however given CKD, not a candidate  - pain management rec oxy 10mg q4h prn, tylenol 650mg ATC x2 days, gabapentin renally dosed, and flexeril prn  - Reglan 10mg TID switched to thorazine 10mg TID for intractable hiccups 4/16; to decrease pill burden, will switch back to reglan.

## 2022-04-18 NOTE — PROGRESS NOTE ADULT - SUBJECTIVE AND OBJECTIVE BOX
Chief Complaint:  Patient is a 61y old  Male who presents with a chief complaint of Esophageal stricture (18 Apr 2022 07:46)    Reason for consult: esophageal stricture    Interval Events: States tolerating full diet (sandwich, chicken) - initially says w/o any issues, but then says he coughs and spits up food sometimes. Abd pain is the same per pt.    Hospital Medications:  bisacodyl Suppository 10 milliGRAM(s) Rectal daily  chlorproMAZINE    Tablet 10 milliGRAM(s) Oral every 8 hours  cyclobenzaprine 5 milliGRAM(s) Oral three times a day PRN  dextrose 5%. 1000 milliLiter(s) IV Continuous <Continuous>  folic acid 1 milliGRAM(s) Oral daily  gabapentin Solution 200 milliGRAM(s) Oral at bedtime  heparin   Injectable 5000 Unit(s) SubCutaneous every 8 hours  lactated ringers. 500 milliLiter(s) IV Continuous <Continuous>  lactated ringers. 500 milliLiter(s) IV Continuous <Continuous>  LORazepam   Injectable 1 milliGRAM(s) IV Push once PRN  melatonin 3 milliGRAM(s) Oral at bedtime PRN  multivitamin 1 Tablet(s) Oral daily  oxyCODONE    Solution 10 milliGRAM(s) Oral every 4 hours PRN  pancrelipase  (CREON  6,000 Lipase Units) 2 Capsule(s) Oral three times a day with meals  pantoprazole  Injectable 40 milliGRAM(s) IV Push two times a day  polyethylene glycol 3350 17 Gram(s) Oral two times a day  senna 2 Tablet(s) Oral at bedtime  sucralfate 1 Gram(s) Oral every 6 hours  tamsulosin 0.4 milliGRAM(s) Oral at bedtime  thiamine 100 milliGRAM(s) Oral daily      ROS:   [x] 14 point ROS negative other than as above  [ ] Patient unable to provide    PHYSICAL EXAM:   Vital Signs:  Vital Signs Last 24 Hrs  T(C): 36.6 (18 Apr 2022 05:46), Max: 37.1 (17 Apr 2022 16:09)  T(F): 97.8 (18 Apr 2022 05:46), Max: 98.8 (17 Apr 2022 16:09)  HR: 74 (18 Apr 2022 05:46) (74 - 85)  BP: 113/62 (18 Apr 2022 05:46) (112/70 - 118/61)  BP(mean): --  RR: 17 (18 Apr 2022 05:46) (17 - 18)  SpO2: 100% (18 Apr 2022 05:46) (100% - 100%)  Daily     Daily     GENERAL: no acute distress  NEURO: alert  HEENT: anicteric sclera  CHEST: no respiratory distress, no accessory muscle use  ABDOMEN: soft, TTP throughout, non-distended, no rebound or guarding  EXTREMITIES: warm, well perfused, no edema  SKIN: no jaundice    LABS: reviewed                        9.6    8.91  )-----------( 280      ( 18 Apr 2022 07:29 )             31.1     04-18    146<H>  |  116<H>  |  27<H>  ----------------------------<  92  3.4<L>   |  20<L>  |  2.88<H>    Ca    8.8      18 Apr 2022 07:29  Phos  2.5     04-18  Mg     2.10     04-18    TPro  5.8<L>  /  Alb  3.0<L>  /  TBili  <0.2  /  DBili  x   /  AST  8   /  ALT  13  /  AlkPhos  77  04-18    LIVER FUNCTIONS - ( 18 Apr 2022 07:29 )  Alb: 3.0 g/dL / Pro: 5.8 g/dL / ALK PHOS: 77 U/L / ALT: 13 U/L / AST: 8 U/L / GGT: x             Interval Diagnostic Studies: see sunrise for full report

## 2022-04-18 NOTE — PROGRESS NOTE ADULT - PROBLEM SELECTOR PLAN 1
Patient found to have esophageal stricture 35cm from incisure, unable to pass EGD scope on 4/8. Transferred to [facility] for advanced GI eval.  -per advanced GI, f/u infectious workup prior to intervention   -Aspiration precautions, head of bed elevation  -patient refused pureed diet, states he has been tolerating regular texture diet; does not feel food gets stuck in throat, counseled on risk of aspiration and death, patient amenable to soft and bite sized diet which he is tolerating well; counseled to chew food well before swallowing  -concern for extrinsic compression given CT chest in December 2021 with fullness around esophogeal junction, per GI now attributing compression to large stool burden? Follow GI recs, now recommending repeat imaging, pending MRI chest/abdomen with IV contrast Patient found to have esophageal stricture 35cm from incisure, unable to pass EGD scope on 4/8. Transferred to [facility] for advanced GI eval.  -per advanced GI, f/u infectious workup prior to intervention, now planned for EGD with possible stent tomorrow 4/19  -Aspiration precautions, head of bed elevation  -patient refused pureed diet, states he has been tolerating regular texture diet; does not feel food gets stuck in throat, counseled on risk of aspiration and death, patient amenable to soft and bite sized diet which he is tolerating well; counseled to chew food well before swallowing  -concern for extrinsic compression given CT chest in December 2021 with fullness around esophogeal junction, per GI now attributing compression to large stool burden? Follow GI recs, now recommending repeat imaging, pending MRI abdomen with IV contrast

## 2022-04-18 NOTE — PROGRESS NOTE ADULT - ATTENDING COMMENTS
Patient seen and examined, care plan discussed with house staff as above.    61yoM w hx of chronic pancreatitis (2/2 ?remote etoh use), CKD Stage IV, HCV (s/p 8 weeks treatment, unclear when), hx of gastric perf w peritonitis and MSSA bacteremia in 2019 requiring ex lap and prolonged hospitalization, 100 lb weight loss unintentionally, transferred from  with esophageal stricture w intractable hiccups and epigastric pain (persists); in need of advanced GI specialist as scope could not be advanced in prior EGD at  4/6. Pt denies dysphagia, described ongoing pain of esophagus.     Found to be febrile on admission to [facility] of 102F. Infectious work up as above-- CXR without infiltrate, though patient is high risk for aspiration event, blood cultures, urine cultures NGTD, HIV neg 4/6, no evidence of necrotizing pancreatitis on CT (continues to show pancreatic ductal dilation). Review of prior CT ap, however patient has not had CT chest to eval esophagus/surrounding structures since Dec 2021 at which time he had CT w oral contrast (see above). Above findings are highly concerning for occult malignancy. Note lytic lesion of iliac crest L side on CT ap 4/6, stable from prior imaging.  Prior EGD with biopsy Jan 2022 showed gastritis/esophagitis, biopsy obtained at [facility] 4/6 w path concerning for pill esophagitis, H pylori neg on initial EGD bx in Dec 2021. CEA 4.5 in 2019. F/u CA-19-9 (high 152) and CEA (nml 3.7) this admission. Baseline EKG 4/14 nonischemic.     Esophageal stricture- Cont IV PPI BID. GI planning for EGD Mon 4/18. Ongoing pain related to esophagitis--> transition all pill meds to solution. Cont carafate, PPI. Pt declines dilaudid, wants morphine (would not give iso CKD IV). Solution oxycodone is improving pain but still not resolved. MRI chest/abd/pelv ordered, pending results (malignancy work up). Pt consented.    Constipation- treat aggressively w BID miralax/senna and daily enema until BM.

## 2022-04-18 NOTE — PROGRESS NOTE ADULT - PROBLEM SELECTOR PLAN 2
Patient with fever on admission to 39.3 with tachycardia. Unclear infectious source, no leukocytosis. S/p 1 dose of zosyn. Consider tumor fever,.   -blood cxs: NGTD  -UA/urine culture negative  -AP CXR without focal consolidation  -Monitor off further antibiotics unless fever recurs Patient with fever on admission to 39.3 with tachycardia. Unclear infectious source, no leukocytosis. S/p 1 dose of zosyn. Consider tumor fever, low suspicion now for infection.   -blood cxs: NGTD  -UA/urine culture negative  -AP CXR without focal consolidation  -Monitor off further antibiotics unless fever recurs

## 2022-04-19 ENCOUNTER — RESULT REVIEW (OUTPATIENT)
Age: 62
End: 2022-04-19

## 2022-04-19 LAB
ALBUMIN SERPL ELPH-MCNC: 3.2 G/DL — LOW (ref 3.3–5)
ALP SERPL-CCNC: 82 U/L — SIGNIFICANT CHANGE UP (ref 40–120)
ALT FLD-CCNC: 11 U/L — SIGNIFICANT CHANGE UP (ref 4–41)
ANION GAP SERPL CALC-SCNC: 12 MMOL/L — SIGNIFICANT CHANGE UP (ref 7–14)
AST SERPL-CCNC: 10 U/L — SIGNIFICANT CHANGE UP (ref 4–40)
BASOPHILS # BLD AUTO: 0.04 K/UL — SIGNIFICANT CHANGE UP (ref 0–0.2)
BASOPHILS NFR BLD AUTO: 0.4 % — SIGNIFICANT CHANGE UP (ref 0–2)
BILIRUB SERPL-MCNC: <0.2 MG/DL — SIGNIFICANT CHANGE UP (ref 0.2–1.2)
BUN SERPL-MCNC: 30 MG/DL — HIGH (ref 7–23)
CALCIUM SERPL-MCNC: 8.7 MG/DL — SIGNIFICANT CHANGE UP (ref 8.4–10.5)
CHLORIDE SERPL-SCNC: 115 MMOL/L — HIGH (ref 98–107)
CO2 SERPL-SCNC: 20 MMOL/L — LOW (ref 22–31)
CREAT SERPL-MCNC: 2.92 MG/DL — HIGH (ref 0.5–1.3)
CULTURE RESULTS: SIGNIFICANT CHANGE UP
CULTURE RESULTS: SIGNIFICANT CHANGE UP
EGFR: 24 ML/MIN/1.73M2 — LOW
EOSINOPHIL # BLD AUTO: 0.39 K/UL — SIGNIFICANT CHANGE UP (ref 0–0.5)
EOSINOPHIL NFR BLD AUTO: 4.1 % — SIGNIFICANT CHANGE UP (ref 0–6)
GLUCOSE SERPL-MCNC: 125 MG/DL — HIGH (ref 70–99)
HCT VFR BLD CALC: 32.6 % — LOW (ref 39–50)
HGB BLD-MCNC: 10.1 G/DL — LOW (ref 13–17)
IANC: 6.61 K/UL — SIGNIFICANT CHANGE UP (ref 1.8–7.4)
IMM GRANULOCYTES NFR BLD AUTO: 0.4 % — SIGNIFICANT CHANGE UP (ref 0–1.5)
LYMPHOCYTES # BLD AUTO: 1.97 K/UL — SIGNIFICANT CHANGE UP (ref 1–3.3)
LYMPHOCYTES # BLD AUTO: 20.5 % — SIGNIFICANT CHANGE UP (ref 13–44)
MAGNESIUM SERPL-MCNC: 2.2 MG/DL — SIGNIFICANT CHANGE UP (ref 1.6–2.6)
MCHC RBC-ENTMCNC: 28.9 PG — SIGNIFICANT CHANGE UP (ref 27–34)
MCHC RBC-ENTMCNC: 31 GM/DL — LOW (ref 32–36)
MCV RBC AUTO: 93.4 FL — SIGNIFICANT CHANGE UP (ref 80–100)
MONOCYTES # BLD AUTO: 0.56 K/UL — SIGNIFICANT CHANGE UP (ref 0–0.9)
MONOCYTES NFR BLD AUTO: 5.8 % — SIGNIFICANT CHANGE UP (ref 2–14)
NEUTROPHILS # BLD AUTO: 6.61 K/UL — SIGNIFICANT CHANGE UP (ref 1.8–7.4)
NEUTROPHILS NFR BLD AUTO: 68.8 % — SIGNIFICANT CHANGE UP (ref 43–77)
NRBC # BLD: 0 /100 WBCS — SIGNIFICANT CHANGE UP
NRBC # FLD: 0 K/UL — SIGNIFICANT CHANGE UP
PHOSPHATE SERPL-MCNC: 2.7 MG/DL — SIGNIFICANT CHANGE UP (ref 2.5–4.5)
PLATELET # BLD AUTO: 310 K/UL — SIGNIFICANT CHANGE UP (ref 150–400)
POTASSIUM SERPL-MCNC: 4.1 MMOL/L — SIGNIFICANT CHANGE UP (ref 3.5–5.3)
POTASSIUM SERPL-SCNC: 4.1 MMOL/L — SIGNIFICANT CHANGE UP (ref 3.5–5.3)
PROT SERPL-MCNC: 6 G/DL — SIGNIFICANT CHANGE UP (ref 6–8.3)
RBC # BLD: 3.49 M/UL — LOW (ref 4.2–5.8)
RBC # FLD: 17.8 % — HIGH (ref 10.3–14.5)
SODIUM SERPL-SCNC: 147 MMOL/L — HIGH (ref 135–145)
SPECIMEN SOURCE: SIGNIFICANT CHANGE UP
SPECIMEN SOURCE: SIGNIFICANT CHANGE UP
WBC # BLD: 9.61 K/UL — SIGNIFICANT CHANGE UP (ref 3.8–10.5)
WBC # FLD AUTO: 9.61 K/UL — SIGNIFICANT CHANGE UP (ref 3.8–10.5)

## 2022-04-19 PROCEDURE — 88342 IMHCHEM/IMCYTCHM 1ST ANTB: CPT | Mod: 26

## 2022-04-19 PROCEDURE — 88365 INSITU HYBRIDIZATION (FISH): CPT | Mod: 26

## 2022-04-19 PROCEDURE — 43239 EGD BIOPSY SINGLE/MULTIPLE: CPT | Mod: GC,59

## 2022-04-19 PROCEDURE — 99233 SBSQ HOSP IP/OBS HIGH 50: CPT

## 2022-04-19 PROCEDURE — 43266 EGD ENDOSCOPIC STENT PLACE: CPT | Mod: GC

## 2022-04-19 PROCEDURE — 88305 TISSUE EXAM BY PATHOLOGIST: CPT | Mod: 26

## 2022-04-19 DEVICE — IMPLANTABLE DEVICE: Type: IMPLANTABLE DEVICE | Status: FUNCTIONAL

## 2022-04-19 DEVICE — GWIRE STRT JAGWIRE 0.035IN X 450: Type: IMPLANTABLE DEVICE | Status: FUNCTIONAL

## 2022-04-19 RX ORDER — SODIUM CHLORIDE 9 MG/ML
1000 INJECTION, SOLUTION INTRAVENOUS
Refills: 0 | Status: DISCONTINUED | OUTPATIENT
Start: 2022-04-19 | End: 2022-04-19

## 2022-04-19 RX ORDER — SODIUM CHLORIDE 9 MG/ML
1000 INJECTION, SOLUTION INTRAVENOUS
Refills: 0 | Status: DISCONTINUED | OUTPATIENT
Start: 2022-04-19 | End: 2022-04-20

## 2022-04-19 RX ORDER — PANTOPRAZOLE SODIUM 20 MG/1
40 TABLET, DELAYED RELEASE ORAL
Refills: 0 | Status: DISCONTINUED | OUTPATIENT
Start: 2022-04-19 | End: 2022-04-23

## 2022-04-19 RX ADMIN — Medication 3 MILLIGRAM(S): at 22:25

## 2022-04-19 RX ADMIN — Medication 2 CAPSULE(S): at 17:47

## 2022-04-19 RX ADMIN — Medication 1 GRAM(S): at 23:44

## 2022-04-19 RX ADMIN — OXYCODONE HYDROCHLORIDE 10 MILLIGRAM(S): 5 TABLET ORAL at 22:23

## 2022-04-19 RX ADMIN — HEPARIN SODIUM 5000 UNIT(S): 5000 INJECTION INTRAVENOUS; SUBCUTANEOUS at 13:31

## 2022-04-19 RX ADMIN — OXYCODONE HYDROCHLORIDE 10 MILLIGRAM(S): 5 TABLET ORAL at 17:53

## 2022-04-19 RX ADMIN — Medication 1 GRAM(S): at 17:47

## 2022-04-19 RX ADMIN — SENNA PLUS 2 TABLET(S): 8.6 TABLET ORAL at 22:16

## 2022-04-19 RX ADMIN — OXYCODONE HYDROCHLORIDE 10 MILLIGRAM(S): 5 TABLET ORAL at 18:49

## 2022-04-19 RX ADMIN — OXYCODONE HYDROCHLORIDE 10 MILLIGRAM(S): 5 TABLET ORAL at 04:54

## 2022-04-19 RX ADMIN — PANTOPRAZOLE SODIUM 40 MILLIGRAM(S): 20 TABLET, DELAYED RELEASE ORAL at 07:00

## 2022-04-19 RX ADMIN — OXYCODONE HYDROCHLORIDE 10 MILLIGRAM(S): 5 TABLET ORAL at 23:13

## 2022-04-19 RX ADMIN — Medication 1 GRAM(S): at 07:00

## 2022-04-19 RX ADMIN — SODIUM CHLORIDE 50 MILLILITER(S): 9 INJECTION, SOLUTION INTRAVENOUS at 09:30

## 2022-04-19 RX ADMIN — TAMSULOSIN HYDROCHLORIDE 0.4 MILLIGRAM(S): 0.4 CAPSULE ORAL at 22:16

## 2022-04-19 RX ADMIN — HEPARIN SODIUM 5000 UNIT(S): 5000 INJECTION INTRAVENOUS; SUBCUTANEOUS at 22:16

## 2022-04-19 RX ADMIN — GABAPENTIN 200 MILLIGRAM(S): 400 CAPSULE ORAL at 23:41

## 2022-04-19 RX ADMIN — PANTOPRAZOLE SODIUM 40 MILLIGRAM(S): 20 TABLET, DELAYED RELEASE ORAL at 17:53

## 2022-04-19 RX ADMIN — MUPIROCIN 1 APPLICATION(S): 20 OINTMENT TOPICAL at 06:12

## 2022-04-19 RX ADMIN — SODIUM CHLORIDE 100 MILLILITER(S): 9 INJECTION, SOLUTION INTRAVENOUS at 14:06

## 2022-04-19 RX ADMIN — Medication 1 GRAM(S): at 00:09

## 2022-04-19 RX ADMIN — OXYCODONE HYDROCHLORIDE 10 MILLIGRAM(S): 5 TABLET ORAL at 04:24

## 2022-04-19 RX ADMIN — OXYCODONE HYDROCHLORIDE 10 MILLIGRAM(S): 5 TABLET ORAL at 14:11

## 2022-04-19 RX ADMIN — OXYCODONE HYDROCHLORIDE 10 MILLIGRAM(S): 5 TABLET ORAL at 13:31

## 2022-04-19 RX ADMIN — MUPIROCIN 1 APPLICATION(S): 20 OINTMENT TOPICAL at 17:55

## 2022-04-19 NOTE — PROGRESS NOTE ADULT - ASSESSMENT
60 yo M with hx of remote ETOH use, chronic pancreatitis, esophagitis, HCV s/p treatment, CKD4, initially presented to [facility] with abdominal pain found to have esophageal stricture on EGD transferred to [facility] for advanced GI evaluation.

## 2022-04-19 NOTE — PROGRESS NOTE ADULT - PROBLEM SELECTOR PLAN 7
Patient with reported weight loss, thin. Hypoalbuminemic at 2.5.  -Nutrition consult  -Nepro supplementation

## 2022-04-19 NOTE — PHYSICAL THERAPY INITIAL EVALUATION ADULT - PERTINENT HX OF CURRENT PROBLEM, REHAB EVAL
Pt is a 61 year old male who presented to [facility] with abdominal pain. Underwent EGD with findings of esophageal structure. Pt transferred to [facility] for advanced gastroenterology evaluation. Pt underwent EGD 04/19 with findings of benign-appearing distal esophageal stricture.

## 2022-04-19 NOTE — PROGRESS NOTE ADULT - ATTENDING COMMENTS
Patient seen and examined, care plan discussed with house staff as above.    61yoM w hx of chronic pancreatitis (2/2 ?remote etoh use), CKD Stage IV, HCV (s/p 8 weeks treatment, unclear when), hx of gastric perf w peritonitis and MSSA bacteremia in 2019 requiring ex lap and prolonged hospitalization, 100 lb weight loss unintentionally, transferred from  with esophageal stricture w intractable hiccups and epigastric pain (persists); in need of advanced GI specialist as scope could not be advanced in prior EGD at  4/6. Pt denies dysphagia, described ongoing pain of esophagus.     Found to be febrile on admission to [facility] of 102F. Infectious work up as above-- CXR without infiltrate, though patient is high risk for aspiration event, blood cultures, urine cultures NGTD, HIV neg 4/6, no evidence of necrotizing pancreatitis on CT (continues to show pancreatic ductal dilation). Review of prior CT ap, however patient has not had CT chest to eval esophagus/surrounding structures since Dec 2021 at which time he had CT w oral contrast (see above). Above findings are highly concerning for occult malignancy. Note lytic lesion of iliac crest L side on CT ap 4/6, stable from prior imaging.  Prior EGD with biopsy Jan 2022 showed gastritis/esophagitis, biopsy obtained at  4/6 w path concerning for pill esophagitis, H pylori neg on initial EGD bx in Dec 2021. CEA 4.5 in 2019. F/u CA-19-9 (high 152) and CEA (nml 3.7) this admission. Baseline EKG 4/14 nonischemic.     Esophageal stricture- Cont PPI BID, transitioned to solution 4/19. GI w EGD Tues 4/19, esophageal stent placed, bx sent (results pending); needs f/u EGD in 8 weeks for removal of stent. Ongoing pain related to esophagitis--> transition all pill meds to solution. Cont carafate, PPI. Pt declines dilaudid, wants morphine (would not give iso CKD IV). Solution oxycodone is improving pain but still not resolved. MRI chest/abd/pelv ordered, pending results (malignancy work up). Pt consented.    Constipation- treat aggressively w BID miralax/senna and daily enema until BM.    Hypernatremia, worsening 4/19- Suspect 2/2 decr oral itnake (NPO pre procedure 4/19). Trial of IVF today, cont to trend. F/u serum osm.

## 2022-04-19 NOTE — PHYSICAL THERAPY INITIAL EVALUATION ADULT - ADDITIONAL COMMENTS
Pt lives in a two-story home with his mom; there are 5 steps to enter and ~15 steps to bedroom in basement. Pt reports he was independent with mobility, self-care, and ADLs; utilized rollator for ambulation.

## 2022-04-19 NOTE — PROGRESS NOTE ADULT - PROBLEM SELECTOR PLAN 6
Hx of esophagitis, with biopsies from 4/8 EGD concerning for possible pill esophagitis.  -Liquid suspension medications when available  -Switch PPI suspension BID to PPI IV BID   -C/w carafate Hx of esophagitis, with biopsies from 4/8 EGD concerning for possible pill esophagitis.  -Liquid suspension medications when available  -c/w PO PPI BID suspension  -C/w carafate

## 2022-04-19 NOTE — PROGRESS NOTE ADULT - SUBJECTIVE AND OBJECTIVE BOX
Authored by Neris Galaviz MD, PGY2  PATIENT:  IMELDA ROBERTSON  [id number]    CHIEF COMPLAINT:  Patient is a 61y old  Male who presents with a chief complaint of Esophageal stricture (18 Apr 2022 09:23)      INTERVAL HISTORY OVERNIGHT EVENTS: YOSSI overnight.       MEDICATIONS:  MEDICATIONS  (STANDING):  bisacodyl Suppository 10 milliGRAM(s) Rectal daily  folic acid 1 milliGRAM(s) Oral daily  gabapentin Solution 200 milliGRAM(s) Oral at bedtime  heparin   Injectable 5000 Unit(s) SubCutaneous every 8 hours  lactated ringers. 500 milliLiter(s) (500 mL/Hr) IV Continuous <Continuous>  lactated ringers. 500 milliLiter(s) (500 mL/Hr) IV Continuous <Continuous>  multivitamin 1 Tablet(s) Oral daily  mupirocin 2% Ointment 1 Application(s) Both Nostrils two times a day  pancrelipase  (CREON  6,000 Lipase Units) 2 Capsule(s) Oral three times a day with meals  pantoprazole  Injectable 40 milliGRAM(s) IV Push two times a day  polyethylene glycol 3350 17 Gram(s) Oral two times a day  potassium chloride  10 mEq/100 mL IVPB 10 milliEquivalent(s) IV Intermittent every 1 hour  senna 2 Tablet(s) Oral at bedtime  sucralfate 1 Gram(s) Oral every 6 hours  tamsulosin 0.4 milliGRAM(s) Oral at bedtime  thiamine 100 milliGRAM(s) Oral daily    MEDICATIONS  (PRN):  cyclobenzaprine 5 milliGRAM(s) Oral three times a day PRN Muscle Spasm  melatonin 3 milliGRAM(s) Oral at bedtime PRN Insomnia  metoclopramide Injectable 5 milliGRAM(s) IV Push every 8 hours PRN hiccuping  oxyCODONE    Solution 10 milliGRAM(s) Oral every 4 hours PRN Severe Pain (7 - 10)      ALLERGIES:  Allergies    No Known Allergies    Intolerances        OBJECTIVE:  ICU Vital Signs Last 24 Hrs  T(C): 36.4 (19 Apr 2022 06:16), Max: 36.7 (18 Apr 2022 22:13)  T(F): 97.5 (19 Apr 2022 06:16), Max: 98 (18 Apr 2022 22:13)  HR: 77 (19 Apr 2022 06:16) (65 - 87)  BP: 114/62 (19 Apr 2022 06:16) (114/62 - 159/89)  BP(mean): --  ABP: --  ABP(mean): --  RR: 18 (19 Apr 2022 06:16) (17 - 18)  SpO2: 100% (19 Apr 2022 06:16) (100% - 100%)          I&O's Summary    18 Apr 2022 07:01  -  19 Apr 2022 07:00  --------------------------------------------------------  IN: 240 mL / OUT: 0 mL / NET: 240 mL          PHYSICAL EXAMINATION:  GENERAL: Frail thin man lying in bed  HEAD:  Atraumatic, Normocephalic  EYES: EOMI, PERRLA, conjunctiva and sclera clear  ENT: Moist mucous membranes  NECK: Supple, No JVD  CHEST/LUNG: Clear to auscultation bilaterally; No rales, rhonchi, wheezing, or rubs. Unlabored respirations  HEART: Regular rate and rhythm; No murmurs, rubs, or gallops  ABDOMEN: BSx4; Soft, tender to palpation in epigastrium, no guarding, hiccuping  EXTREMITIES:  1+ Peripheral Pulses, brisk capillary refill. long toenails  NERVOUS SYSTEM:  A&Ox3, no focal deficits   SKIN: No rashes or lesions      LABS:                          9.6    8.91  )-----------( 280      ( 18 Apr 2022 07:29 )             31.1     04-18    146<H>  |  116<H>  |  27<H>  ----------------------------<  92  3.4<L>   |  20<L>  |  2.88<H>    Ca    8.8      18 Apr 2022 07:29  Phos  2.5     04-18  Mg     2.10     04-18    TPro  5.8<L>  /  Alb  3.0<L>  /  TBili  <0.2  /  DBili  x   /  AST  8   /  ALT  13  /  AlkPhos  77  04-18    LIVER FUNCTIONS - ( 18 Apr 2022 07:29 )  Alb: 3.0 g/dL / Pro: 5.8 g/dL / ALK PHOS: 77 U/L / ALT: 13 U/L / AST: 8 U/L / GGT: x                       TELEMETRY:     EKG:     IMAGING:       Authored by Neris Galaviz MD, PGY2  PATIENT:  IMELDA ROBERTSON  [id number]    CHIEF COMPLAINT:  Patient is a 61y old  Male who presents with a chief complaint of Esophageal stricture (18 Apr 2022 09:23)      INTERVAL HISTORY OVERNIGHT EVENTS: YOSSI overnight. Patient endorses continued hiccuping and epigastric pain. Patient had hard stool which he had to manually extract.       MEDICATIONS:  MEDICATIONS  (STANDING):  bisacodyl Suppository 10 milliGRAM(s) Rectal daily  folic acid 1 milliGRAM(s) Oral daily  gabapentin Solution 200 milliGRAM(s) Oral at bedtime  heparin   Injectable 5000 Unit(s) SubCutaneous every 8 hours  lactated ringers. 500 milliLiter(s) (500 mL/Hr) IV Continuous <Continuous>  lactated ringers. 500 milliLiter(s) (500 mL/Hr) IV Continuous <Continuous>  multivitamin 1 Tablet(s) Oral daily  mupirocin 2% Ointment 1 Application(s) Both Nostrils two times a day  pancrelipase  (CREON  6,000 Lipase Units) 2 Capsule(s) Oral three times a day with meals  pantoprazole  Injectable 40 milliGRAM(s) IV Push two times a day  polyethylene glycol 3350 17 Gram(s) Oral two times a day  potassium chloride  10 mEq/100 mL IVPB 10 milliEquivalent(s) IV Intermittent every 1 hour  senna 2 Tablet(s) Oral at bedtime  sucralfate 1 Gram(s) Oral every 6 hours  tamsulosin 0.4 milliGRAM(s) Oral at bedtime  thiamine 100 milliGRAM(s) Oral daily    MEDICATIONS  (PRN):  cyclobenzaprine 5 milliGRAM(s) Oral three times a day PRN Muscle Spasm  melatonin 3 milliGRAM(s) Oral at bedtime PRN Insomnia  metoclopramide Injectable 5 milliGRAM(s) IV Push every 8 hours PRN hiccuping  oxyCODONE    Solution 10 milliGRAM(s) Oral every 4 hours PRN Severe Pain (7 - 10)      ALLERGIES:  Allergies    No Known Allergies    Intolerances        OBJECTIVE:  ICU Vital Signs Last 24 Hrs  T(C): 36.4 (19 Apr 2022 06:16), Max: 36.7 (18 Apr 2022 22:13)  T(F): 97.5 (19 Apr 2022 06:16), Max: 98 (18 Apr 2022 22:13)  HR: 77 (19 Apr 2022 06:16) (65 - 87)  BP: 114/62 (19 Apr 2022 06:16) (114/62 - 159/89)  BP(mean): --  ABP: --  ABP(mean): --  RR: 18 (19 Apr 2022 06:16) (17 - 18)  SpO2: 100% (19 Apr 2022 06:16) (100% - 100%)          I&O's Summary    18 Apr 2022 07:01  -  19 Apr 2022 07:00  --------------------------------------------------------  IN: 240 mL / OUT: 0 mL / NET: 240 mL          PHYSICAL EXAMINATION:  GENERAL: Frail thin man lying in bed  HEAD:  Atraumatic, Normocephalic  EYES: EOMI, PERRLA, conjunctiva and sclera clear  ENT: Moist mucous membranes  NECK: Supple, No JVD  CHEST/LUNG: Clear to auscultation bilaterally; No rales, rhonchi, wheezing, or rubs. Unlabored respirations  HEART: Regular rate and rhythm; No murmurs, rubs, or gallops  ABDOMEN: BSx4; Soft, tender to palpation in epigastrium, no guarding, hiccuping  EXTREMITIES:  1+ Peripheral Pulses, brisk capillary refill. long toenails  NERVOUS SYSTEM:  A&Ox3, no focal deficits   SKIN: No rashes or lesions      LABS:                          9.6    8.91  )-----------( 280      ( 18 Apr 2022 07:29 )             31.1     04-18    146<H>  |  116<H>  |  27<H>  ----------------------------<  92  3.4<L>   |  20<L>  |  2.88<H>    Ca    8.8      18 Apr 2022 07:29  Phos  2.5     04-18  Mg     2.10     04-18    TPro  5.8<L>  /  Alb  3.0<L>  /  TBili  <0.2  /  DBili  x   /  AST  8   /  ALT  13  /  AlkPhos  77  04-18    LIVER FUNCTIONS - ( 18 Apr 2022 07:29 )  Alb: 3.0 g/dL / Pro: 5.8 g/dL / ALK PHOS: 77 U/L / ALT: 13 U/L / AST: 8 U/L / GGT: x                       TELEMETRY:     EKG:     IMAGING:

## 2022-04-19 NOTE — PHYSICAL THERAPY INITIAL EVALUATION ADULT - GENERAL OBSERVATIONS, REHAB EVAL
Upon entry, pt semi-supine in bed in NAD; + IV. Pt left as received with all tubes/lines intact, bed alarm on, call bell in reach and in NAD. RN aware.

## 2022-04-19 NOTE — PROGRESS NOTE ADULT - PROBLEM SELECTOR PLAN 1
Patient found to have esophageal stricture 35cm from incisure, unable to pass EGD scope on 4/8. Transferred to [facility] for advanced GI eval.  -per advanced GI, f/u infectious workup prior to intervention, now planned for EGD with possible stent vs dilation today 4/19  -Aspiration precautions, head of bed elevation  -patient refused pureed diet, states he has been tolerating regular texture diet; does not feel food gets stuck in throat, counseled on risk of aspiration and death, patient amenable to soft and bite sized diet which he is tolerating well; counseled to chew food well before swallowing  -concern for extrinsic compression given CT chest in December 2021 with fullness around esophogeal junction, per GI now attributing compression to large stool burden? Follow GI recs, now recommending repeat imaging, pending MRI abdomen with IV contrast Patient found to have esophageal stricture 35cm from incisure, unable to pass EGD scope on 4/8. Transferred to [facility] for advanced GI eval.  -per advanced GI, f/u infectious workup prior to intervention, now s/p EGD with stent today 4/19, will resume clear liquid diet, then pureed  -Aspiration precautions, head of bed elevation  -per EGD report, restriction characterized as benign, biopsy obtained; will proceed with MRI abdomen with IV contrast to r/o any other underling malignancy; stricture may be 2/2 chronic esophogitis  -c/w PPI PO for total of 8 weeks on BID dosing, then daily

## 2022-04-19 NOTE — PHYSICAL THERAPY INITIAL EVALUATION ADULT - PHYSICAL ASSIST/NONPHYSICAL ASSIST: STAND/SIT, REHAB EVAL
The patient came in insect bite, with redness and swelling in the right leg. It happened yesterday, no fever.   
verbal cues/nonverbal cues (demo/gestures)/1 person assist

## 2022-04-19 NOTE — PROGRESS NOTE ADULT - PROBLEM SELECTOR PLAN 2
Patient with fever on admission to 39.3 with tachycardia. Unclear infectious source, no leukocytosis. S/p 1 dose of zosyn. Consider tumor fever, low suspicion now for infection.   -blood cxs: NGTD  -UA/urine culture negative  -AP CXR without focal consolidation  -Monitor off further antibiotics unless fever recurs

## 2022-04-19 NOTE — PHYSICAL THERAPY INITIAL EVALUATION ADULT - THERAPY FREQUENCY, PT EVAL
PHYSICAL THERAPY KNEE TREATMENT NOTE - INPATIENT     Room Number: 408/408-A             Presenting Problem: R TKA revision (R knee resection & antibiotic spacer placement) on 8/3; post-op anemia, low BP, HF    Problem List  Active Problems:    Hypertension lower extremity        R Lower Extremity: Touch Down Weight Bearing       PAIN ASSESSMENT   Rating: Unable to rate  Location: r KNEE  Management Techniques: Activity promotion; Body mechanics; Relaxation;Repositioning    BALANCE  Static Sitting: Fair +  Maya status      Goal #2  Current Status Max a   Goal #3 Patient is able to ambulate 15 feet with assistive device at assistance level: supervision while maintaining RLE TDWB status   Goal #3   Current Status  NOT met;sttod only   Goal #4 Patient is able to tra 3-5x/week

## 2022-04-19 NOTE — PHYSICAL THERAPY INITIAL EVALUATION ADULT - DIAGNOSIS, PT EVAL
Upon evaluation, pt presents with impairments in strength, balance, coordination, and gait. Pt would benefit from skilled PT services in the acute care setting to address impairments to facilitate return to prior level of function.

## 2022-04-19 NOTE — PRE-OP CHECKLIST - IV STARTED
[Alert] : alert [Normal Sclera/Conjunctiva] : normal sclera/conjunctiva [Normal Outer Ear/Nose] : the ears and nose were normal in appearance [No Respiratory Distress] : no respiratory distress [Clear to Auscultation] : lungs were clear to auscultation bilaterally [Normal Rate] : heart rate was normal [Regular Rhythm] : with a regular rhythm [No Edema] : no peripheral edema [Normal Bowel Sounds] : normal bowel sounds [Spine Straight] : spine straight [No Stigmata of Cushings Syndrome] : no stigmata of Cushings Syndrome [Normal Gait] : normal gait [No Rash] : no rash [Normal Reflexes] : deep tendon reflexes were 2+ and symmetric [Oriented x3] : oriented to person, place, and time yes

## 2022-04-19 NOTE — PROGRESS NOTE ADULT - PROBLEM SELECTOR PLAN 3
pt reports 10/10 chest pain - likely in the setting of esophagitis/esophageal stricture with extrinsic compression   -low suspicion for cardiac etiology - EKG: without ischemic changes, troponin negative   -pt refusing dilaudid/tylenol, requesting morphine - however given CKD, not a candidate  - pain management rec oxy 10mg q4h prn, tylenol 650mg ATC x2 days, gabapentin renally dosed, and flexeril prn  - Reglan 10mg TID switched to thorazine 10mg TID for intractable hiccups 4/16; to decrease pill burden, will switch back to reglan.

## 2022-04-20 ENCOUNTER — APPOINTMENT (OUTPATIENT)
Dept: INTERNAL MEDICINE | Facility: CLINIC | Age: 62
End: 2022-04-20

## 2022-04-20 DIAGNOSIS — R11.2 NAUSEA WITH VOMITING, UNSPECIFIED: ICD-10-CM

## 2022-04-20 DIAGNOSIS — N18.4 CHRONIC KIDNEY DISEASE, STAGE 4 (SEVERE): ICD-10-CM

## 2022-04-20 DIAGNOSIS — N17.9 ACUTE KIDNEY FAILURE, UNSPECIFIED: ICD-10-CM

## 2022-04-20 DIAGNOSIS — K22.2 ESOPHAGEAL OBSTRUCTION: ICD-10-CM

## 2022-04-20 DIAGNOSIS — F10.10 ALCOHOL ABUSE, UNCOMPLICATED: ICD-10-CM

## 2022-04-20 DIAGNOSIS — K86.1 OTHER CHRONIC PANCREATITIS: ICD-10-CM

## 2022-04-20 DIAGNOSIS — E86.0 DEHYDRATION: ICD-10-CM

## 2022-04-20 DIAGNOSIS — E43 UNSPECIFIED SEVERE PROTEIN-CALORIE MALNUTRITION: ICD-10-CM

## 2022-04-20 LAB
ALBUMIN SERPL ELPH-MCNC: 3.1 G/DL — LOW (ref 3.3–5)
ALP SERPL-CCNC: 84 U/L — SIGNIFICANT CHANGE UP (ref 40–120)
ALT FLD-CCNC: 16 U/L — SIGNIFICANT CHANGE UP (ref 4–41)
ANION GAP SERPL CALC-SCNC: 14 MMOL/L — SIGNIFICANT CHANGE UP (ref 7–14)
AST SERPL-CCNC: 9 U/L — SIGNIFICANT CHANGE UP (ref 4–40)
BASOPHILS # BLD AUTO: 0.03 K/UL — SIGNIFICANT CHANGE UP (ref 0–0.2)
BASOPHILS NFR BLD AUTO: 0.3 % — SIGNIFICANT CHANGE UP (ref 0–2)
BILIRUB SERPL-MCNC: <0.2 MG/DL — SIGNIFICANT CHANGE UP (ref 0.2–1.2)
BUN SERPL-MCNC: 25 MG/DL — HIGH (ref 7–23)
CALCIUM SERPL-MCNC: 8.7 MG/DL — SIGNIFICANT CHANGE UP (ref 8.4–10.5)
CHLORIDE SERPL-SCNC: 112 MMOL/L — HIGH (ref 98–107)
CO2 SERPL-SCNC: 17 MMOL/L — LOW (ref 22–31)
CREAT SERPL-MCNC: 2.72 MG/DL — HIGH (ref 0.5–1.3)
EGFR: 26 ML/MIN/1.73M2 — LOW
EOSINOPHIL # BLD AUTO: 0 K/UL — SIGNIFICANT CHANGE UP (ref 0–0.5)
EOSINOPHIL NFR BLD AUTO: 0 % — SIGNIFICANT CHANGE UP (ref 0–6)
GLUCOSE SERPL-MCNC: 115 MG/DL — HIGH (ref 70–99)
HCT VFR BLD CALC: 33.8 % — LOW (ref 39–50)
HGB BLD-MCNC: 10.6 G/DL — LOW (ref 13–17)
IANC: 9.2 K/UL — HIGH (ref 1.8–7.4)
IMM GRANULOCYTES NFR BLD AUTO: 0.3 % — SIGNIFICANT CHANGE UP (ref 0–1.5)
LIDOCAIN IGE QN: 33 U/L — SIGNIFICANT CHANGE UP (ref 7–60)
LYMPHOCYTES # BLD AUTO: 1.94 K/UL — SIGNIFICANT CHANGE UP (ref 1–3.3)
LYMPHOCYTES # BLD AUTO: 16.3 % — SIGNIFICANT CHANGE UP (ref 13–44)
MAGNESIUM SERPL-MCNC: 1.9 MG/DL — SIGNIFICANT CHANGE UP (ref 1.6–2.6)
MCHC RBC-ENTMCNC: 28.8 PG — SIGNIFICANT CHANGE UP (ref 27–34)
MCHC RBC-ENTMCNC: 31.4 GM/DL — LOW (ref 32–36)
MCV RBC AUTO: 91.8 FL — SIGNIFICANT CHANGE UP (ref 80–100)
MONOCYTES # BLD AUTO: 0.66 K/UL — SIGNIFICANT CHANGE UP (ref 0–0.9)
MONOCYTES NFR BLD AUTO: 5.6 % — SIGNIFICANT CHANGE UP (ref 2–14)
NEUTROPHILS # BLD AUTO: 9.2 K/UL — HIGH (ref 1.8–7.4)
NEUTROPHILS NFR BLD AUTO: 77.5 % — HIGH (ref 43–77)
NRBC # BLD: 0 /100 WBCS — SIGNIFICANT CHANGE UP
NRBC # FLD: 0 K/UL — SIGNIFICANT CHANGE UP
PHOSPHATE SERPL-MCNC: 2.3 MG/DL — LOW (ref 2.5–4.5)
PLATELET # BLD AUTO: 326 K/UL — SIGNIFICANT CHANGE UP (ref 150–400)
POTASSIUM SERPL-MCNC: 3.3 MMOL/L — LOW (ref 3.5–5.3)
POTASSIUM SERPL-SCNC: 3.3 MMOL/L — LOW (ref 3.5–5.3)
PROT SERPL-MCNC: 6.2 G/DL — SIGNIFICANT CHANGE UP (ref 6–8.3)
RBC # BLD: 3.68 M/UL — LOW (ref 4.2–5.8)
RBC # FLD: 17.2 % — HIGH (ref 10.3–14.5)
SODIUM SERPL-SCNC: 143 MMOL/L — SIGNIFICANT CHANGE UP (ref 135–145)
WBC # BLD: 11.87 K/UL — HIGH (ref 3.8–10.5)
WBC # FLD AUTO: 11.87 K/UL — HIGH (ref 3.8–10.5)

## 2022-04-20 PROCEDURE — 74183 MRI ABD W/O CNTR FLWD CNTR: CPT | Mod: 26

## 2022-04-20 PROCEDURE — 74018 RADEX ABDOMEN 1 VIEW: CPT | Mod: 26

## 2022-04-20 PROCEDURE — 99232 SBSQ HOSP IP/OBS MODERATE 35: CPT | Mod: GC

## 2022-04-20 PROCEDURE — 93010 ELECTROCARDIOGRAM REPORT: CPT

## 2022-04-20 PROCEDURE — 99233 SBSQ HOSP IP/OBS HIGH 50: CPT

## 2022-04-20 PROCEDURE — 71045 X-RAY EXAM CHEST 1 VIEW: CPT | Mod: 26

## 2022-04-20 RX ORDER — SOD SULF/SODIUM/NAHCO3/KCL/PEG
4000 SOLUTION, RECONSTITUTED, ORAL ORAL ONCE
Refills: 0 | Status: COMPLETED | OUTPATIENT
Start: 2022-04-20 | End: 2022-04-20

## 2022-04-20 RX ORDER — CYCLOBENZAPRINE HYDROCHLORIDE 10 MG/1
5 TABLET, FILM COATED ORAL THREE TIMES A DAY
Refills: 0 | Status: DISCONTINUED | OUTPATIENT
Start: 2022-04-20 | End: 2022-04-20

## 2022-04-20 RX ORDER — ACETAMINOPHEN 500 MG
650 TABLET ORAL EVERY 6 HOURS
Refills: 0 | Status: COMPLETED | OUTPATIENT
Start: 2022-04-20 | End: 2022-04-22

## 2022-04-20 RX ORDER — SODIUM CHLORIDE 9 MG/ML
500 INJECTION, SOLUTION INTRAVENOUS
Refills: 0 | Status: COMPLETED | OUTPATIENT
Start: 2022-04-20

## 2022-04-20 RX ORDER — GABAPENTIN 400 MG/1
300 CAPSULE ORAL AT BEDTIME
Refills: 0 | Status: DISCONTINUED | OUTPATIENT
Start: 2022-04-20 | End: 2022-04-20

## 2022-04-20 RX ORDER — GABAPENTIN 400 MG/1
300 CAPSULE ORAL AT BEDTIME
Refills: 0 | Status: DISCONTINUED | OUTPATIENT
Start: 2022-04-20 | End: 2022-04-21

## 2022-04-20 RX ORDER — SODIUM CHLORIDE 9 MG/ML
500 INJECTION, SOLUTION INTRAVENOUS
Refills: 0 | Status: DISCONTINUED | OUTPATIENT
Start: 2022-04-20 | End: 2022-04-21

## 2022-04-20 RX ORDER — CYCLOBENZAPRINE HYDROCHLORIDE 10 MG/1
5 TABLET, FILM COATED ORAL THREE TIMES A DAY
Refills: 0 | Status: DISCONTINUED | OUTPATIENT
Start: 2022-04-20 | End: 2022-04-21

## 2022-04-20 RX ORDER — MAGNESIUM SULFATE 500 MG/ML
1 VIAL (ML) INJECTION ONCE
Refills: 0 | Status: COMPLETED | OUTPATIENT
Start: 2022-04-20 | End: 2022-04-20

## 2022-04-20 RX ORDER — HYDROMORPHONE HYDROCHLORIDE 2 MG/ML
0.5 INJECTION INTRAMUSCULAR; INTRAVENOUS; SUBCUTANEOUS EVERY 4 HOURS
Refills: 0 | Status: DISCONTINUED | OUTPATIENT
Start: 2022-04-20 | End: 2022-04-20

## 2022-04-20 RX ORDER — HYDROMORPHONE HYDROCHLORIDE 2 MG/ML
0.5 INJECTION INTRAMUSCULAR; INTRAVENOUS; SUBCUTANEOUS EVERY 4 HOURS
Refills: 0 | Status: DISCONTINUED | OUTPATIENT
Start: 2022-04-20 | End: 2022-04-23

## 2022-04-20 RX ORDER — CHLORPROMAZINE HCL 10 MG
10 TABLET ORAL EVERY 8 HOURS
Refills: 0 | Status: DISCONTINUED | OUTPATIENT
Start: 2022-04-20 | End: 2022-04-20

## 2022-04-20 RX ORDER — LACTULOSE 10 G/15ML
20 SOLUTION ORAL DAILY
Refills: 0 | Status: DISCONTINUED | OUTPATIENT
Start: 2022-04-20 | End: 2022-04-23

## 2022-04-20 RX ORDER — POTASSIUM CHLORIDE 20 MEQ
40 PACKET (EA) ORAL ONCE
Refills: 0 | Status: COMPLETED | OUTPATIENT
Start: 2022-04-20 | End: 2022-04-20

## 2022-04-20 RX ORDER — SODIUM,POTASSIUM PHOSPHATES 278-250MG
1 POWDER IN PACKET (EA) ORAL ONCE
Refills: 0 | Status: COMPLETED | OUTPATIENT
Start: 2022-04-20 | End: 2022-04-20

## 2022-04-20 RX ORDER — CHLORPROMAZINE HCL 10 MG
10 TABLET ORAL EVERY 8 HOURS
Refills: 0 | Status: DISCONTINUED | OUTPATIENT
Start: 2022-04-20 | End: 2022-04-23

## 2022-04-20 RX ADMIN — HEPARIN SODIUM 5000 UNIT(S): 5000 INJECTION INTRAVENOUS; SUBCUTANEOUS at 22:04

## 2022-04-20 RX ADMIN — SODIUM CHLORIDE 500 MILLILITER(S): 9 INJECTION, SOLUTION INTRAVENOUS at 18:46

## 2022-04-20 RX ADMIN — Medication 10 MILLIGRAM(S): at 13:22

## 2022-04-20 RX ADMIN — Medication 40 MILLIEQUIVALENT(S): at 09:35

## 2022-04-20 RX ADMIN — TAMSULOSIN HYDROCHLORIDE 0.4 MILLIGRAM(S): 0.4 CAPSULE ORAL at 22:04

## 2022-04-20 RX ADMIN — POLYETHYLENE GLYCOL 3350 17 GRAM(S): 17 POWDER, FOR SOLUTION ORAL at 18:47

## 2022-04-20 RX ADMIN — Medication 1 TABLET(S): at 13:08

## 2022-04-20 RX ADMIN — Medication 1 PACKET(S): at 09:34

## 2022-04-20 RX ADMIN — SODIUM CHLORIDE 1000 MILLILITER(S): 9 INJECTION, SOLUTION INTRAVENOUS at 13:07

## 2022-04-20 RX ADMIN — SENNA PLUS 2 TABLET(S): 8.6 TABLET ORAL at 22:05

## 2022-04-20 RX ADMIN — Medication 2 CAPSULE(S): at 13:10

## 2022-04-20 RX ADMIN — OXYCODONE HYDROCHLORIDE 10 MILLIGRAM(S): 5 TABLET ORAL at 03:10

## 2022-04-20 RX ADMIN — LACTULOSE 20 GRAM(S): 10 SOLUTION ORAL at 18:44

## 2022-04-20 RX ADMIN — OXYCODONE HYDROCHLORIDE 10 MILLIGRAM(S): 5 TABLET ORAL at 13:10

## 2022-04-20 RX ADMIN — MUPIROCIN 1 APPLICATION(S): 20 OINTMENT TOPICAL at 05:26

## 2022-04-20 RX ADMIN — Medication 1 GRAM(S): at 13:08

## 2022-04-20 RX ADMIN — HEPARIN SODIUM 5000 UNIT(S): 5000 INJECTION INTRAVENOUS; SUBCUTANEOUS at 13:09

## 2022-04-20 RX ADMIN — Medication 1 GRAM(S): at 18:47

## 2022-04-20 RX ADMIN — Medication 4000 MILLILITER(S): at 18:44

## 2022-04-20 RX ADMIN — HEPARIN SODIUM 5000 UNIT(S): 5000 INJECTION INTRAVENOUS; SUBCUTANEOUS at 05:24

## 2022-04-20 RX ADMIN — CYCLOBENZAPRINE HYDROCHLORIDE 5 MILLIGRAM(S): 10 TABLET, FILM COATED ORAL at 13:08

## 2022-04-20 RX ADMIN — Medication 100 GRAM(S): at 09:35

## 2022-04-20 RX ADMIN — Medication 650 MILLIGRAM(S): at 14:31

## 2022-04-20 RX ADMIN — Medication 1 MILLIGRAM(S): at 13:08

## 2022-04-20 RX ADMIN — Medication 1 GRAM(S): at 05:24

## 2022-04-20 RX ADMIN — OXYCODONE HYDROCHLORIDE 10 MILLIGRAM(S): 5 TABLET ORAL at 08:13

## 2022-04-20 RX ADMIN — PANTOPRAZOLE SODIUM 40 MILLIGRAM(S): 20 TABLET, DELAYED RELEASE ORAL at 18:47

## 2022-04-20 RX ADMIN — Medication 2 CAPSULE(S): at 09:38

## 2022-04-20 RX ADMIN — Medication 2 CAPSULE(S): at 18:46

## 2022-04-20 RX ADMIN — Medication 100 MILLIGRAM(S): at 13:09

## 2022-04-20 RX ADMIN — MUPIROCIN 1 APPLICATION(S): 20 OINTMENT TOPICAL at 18:47

## 2022-04-20 RX ADMIN — OXYCODONE HYDROCHLORIDE 10 MILLIGRAM(S): 5 TABLET ORAL at 23:46

## 2022-04-20 RX ADMIN — CYCLOBENZAPRINE HYDROCHLORIDE 5 MILLIGRAM(S): 10 TABLET, FILM COATED ORAL at 22:05

## 2022-04-20 RX ADMIN — Medication 650 MILLIGRAM(S): at 14:03

## 2022-04-20 RX ADMIN — PANTOPRAZOLE SODIUM 40 MILLIGRAM(S): 20 TABLET, DELAYED RELEASE ORAL at 05:25

## 2022-04-20 RX ADMIN — CYCLOBENZAPRINE HYDROCHLORIDE 5 MILLIGRAM(S): 10 TABLET, FILM COATED ORAL at 14:03

## 2022-04-20 RX ADMIN — OXYCODONE HYDROCHLORIDE 10 MILLIGRAM(S): 5 TABLET ORAL at 13:32

## 2022-04-20 RX ADMIN — GABAPENTIN 300 MILLIGRAM(S): 400 CAPSULE ORAL at 22:04

## 2022-04-20 RX ADMIN — OXYCODONE HYDROCHLORIDE 10 MILLIGRAM(S): 5 TABLET ORAL at 02:40

## 2022-04-20 RX ADMIN — Medication 1 MILLIGRAM(S): at 14:03

## 2022-04-20 RX ADMIN — OXYCODONE HYDROCHLORIDE 10 MILLIGRAM(S): 5 TABLET ORAL at 09:00

## 2022-04-20 NOTE — PROGRESS NOTE ADULT - PROBLEM SELECTOR PLAN 6
Hx of esophagitis, with biopsies from 4/8 EGD concerning for possible pill esophagitis.  -Liquid suspension medications when available  -c/w PO PPI BID suspension  -C/w carafate

## 2022-04-20 NOTE — PROGRESS NOTE ADULT - PROBLEM SELECTOR PLAN 2
Patient with fever on admission to 39.3 with tachycardia. Unclear infectious source, no leukocytosis. S/p 1 dose of zosyn. Consider tumor fever, low suspicion now for infection.   -blood cxs: NGTD  -UA/urine culture negative  -AP CXR without focal consolidation  -Monitor off further antibiotics unless fever recurs  -4/20: today with leukocytosis, likely reactive s/p procedure

## 2022-04-20 NOTE — CHART NOTE - NSCHARTNOTEFT_GEN_A_CORE
Primary team with question about constipation and role of colonoscopy.    No indication for inpatient colonoscopy for constipation.  Suspect this is related to pain medication patient is requiring due to severe pain from his chronic pancreatitis.    Patient is not clinically obstructed. His abd exam is soft.     Review of CT from 4/6 shows significant stool burden, especially in the rectum. Constipation may be adding to patient's pain.    Recommend aggressive bowel regimen.   Order 4L golytely and have patient drink slowly until he is having bowel movements.  Can give tap water enemas as well to address large rectal stool burden.  Continue diet, this may help with bowel movements.  Can consider outpatient colonoscopy in the future.     Please call us back with further questions if these steps do not work.    Recommendations preliminary until signed by attending.     Panda Clarke  Gastroenterology/Hepatology Fellow  Available via [employer] Teams    NON-URGENT CONSULTS:  Please email [email or web address]  AT NIGHT AND ON WEEKENDS:  Contact on-call GI fellow via answering service ([phone]) from 5pm-8am and on weekends/holidays  MONDAY-FRIDAY 8AM-5PM:  Pager# [phone] ([facility]) or [phone] ([facility])  GI Phone# [phone] ([facility])

## 2022-04-20 NOTE — PROGRESS NOTE ADULT - ASSESSMENT
61M with history of chronic pancreatitis, HCV, CKD, hx of esophagitis, presenting w/ abd pain, transferred for evaluation of esophageal stricture.    Impression:  #Esophageal stricture w/ dysphagia - Likely benign peptic stricture given long-standing severe esophagitis. S/p EGD w/ stent placement 4/19 (fully covered WallFlex stent, 18 mm x 103 mm)  #Abd pain - likely 2/2 chronic pancreatitis. Unrelated to stricture. Exam is notable for severe TTP to light skin touch. Possible neuropathic component.  #HCV s/p treatment (neg viral load)  #CKD  #Fever    Recommendations:  - Pureed diet.   - PPI BID.  - Pain control per primary team/pain management.  - Repeat EGD in 3-4 weeks w/ Dr. Hinds for EGD/stent removal.    Advanced GI will sign off. Please call us back as needed.    Recommendations preliminary until signed by attending.     Panda Clarke  Gastroenterology/Hepatology Fellow  Available via [employer] Teams    NON-URGENT CONSULTS:  Please email [email or web address]  [email or web address]  AT NIGHT AND ON WEEKENDS:  Contact on-call GI fellow via answering service ([phone]) from 5pm-8am and on weekends/holidays  MONDAY-FRIDAY 8AM-5PM:  Pager# [phone] ([facility]) or [phone] ([facility])  GI Phone# [phone] ([facility])

## 2022-04-20 NOTE — PROGRESS NOTE ADULT - SUBJECTIVE AND OBJECTIVE BOX
Chief Complaint:  Patient is a 61y old  Male who presents with a chief complaint of Esophageal stricture (19 Apr 2022 07:51)    Reason for consult: esophageal stricture    Interval Events: S/p EGD w/ stent. Reporting persistent epigastric abd pain, similar to prior to procedure. Denies nausea, vomiting, melena, hematochezia.     Hospital Medications:  bisacodyl Suppository 10 milliGRAM(s) Rectal daily  cyclobenzaprine 5 milliGRAM(s) Oral three times a day PRN  dextrose 5%. 1000 milliLiter(s) IV Continuous <Continuous>  folic acid 1 milliGRAM(s) Oral daily  gabapentin Solution 200 milliGRAM(s) Oral at bedtime  heparin   Injectable 5000 Unit(s) SubCutaneous every 8 hours  lactated ringers. 500 milliLiter(s) IV Continuous <Continuous>  lactated ringers. 500 milliLiter(s) IV Continuous <Continuous>  LORazepam   Injectable 1 milliGRAM(s) IV Push once PRN  melatonin 3 milliGRAM(s) Oral at bedtime PRN  metoclopramide Injectable 5 milliGRAM(s) IV Push every 8 hours PRN  multivitamin 1 Tablet(s) Oral daily  mupirocin 2% Ointment 1 Application(s) Both Nostrils two times a day  oxyCODONE    Solution 10 milliGRAM(s) Oral every 4 hours PRN  pancrelipase  (CREON  6,000 Lipase Units) 2 Capsule(s) Oral three times a day with meals  pantoprazole   Suspension 40 milliGRAM(s) Oral two times a day  polyethylene glycol 3350 17 Gram(s) Oral two times a day  potassium chloride  10 mEq/100 mL IVPB 10 milliEquivalent(s) IV Intermittent every 1 hour  senna 2 Tablet(s) Oral at bedtime  sucralfate 1 Gram(s) Oral every 6 hours  tamsulosin 0.4 milliGRAM(s) Oral at bedtime  thiamine 100 milliGRAM(s) Oral daily      ROS:   [x] 14 point ROS negative other than as above  [ ] Patient unable to provide    PHYSICAL EXAM:   Vital Signs:  Vital Signs Last 24 Hrs  T(C): 36.6 (20 Apr 2022 05:32), Max: 36.9 (19 Apr 2022 13:15)  T(F): 97.9 (20 Apr 2022 05:32), Max: 98.4 (19 Apr 2022 13:15)  HR: 80 (20 Apr 2022 05:32) (67 - 80)  BP: 152/78 (20 Apr 2022 05:32) (127/73 - 161/86)  BP(mean): --  RR: 17 (20 Apr 2022 05:32) (17 - 22)  SpO2: 100% (20 Apr 2022 05:32) (100% - 100%)  Daily Height in cm: 187.96 (19 Apr 2022 08:45)    Daily     GENERAL: no acute distress  NEURO: alert  HEENT: anicteric sclera  CHEST: no respiratory distress, no accessory muscle use  ABDOMEN: soft, TTP epigastrium, non-distended, no rebound or guarding  EXTREMITIES: warm, well perfused, no edema  SKIN: no jaundice    LABS: reviewed                        10.6   11.87 )-----------( 326      ( 20 Apr 2022 05:46 )             33.8     04-20    143  |  112<H>  |  25<H>  ----------------------------<  115<H>  3.3<L>   |  17<L>  |  2.72<H>    Ca    8.7      20 Apr 2022 05:46  Phos  2.3     04-20  Mg     1.90     04-20    TPro  6.2  /  Alb  3.1<L>  /  TBili  <0.2  /  DBili  x   /  AST  9   /  ALT  16  /  AlkPhos  84  04-20    LIVER FUNCTIONS - ( 20 Apr 2022 05:46 )  Alb: 3.1 g/dL / Pro: 6.2 g/dL / ALK PHOS: 84 U/L / ALT: 16 U/L / AST: 9 U/L / GGT: x             Interval Diagnostic Studies: see sunrise for full report

## 2022-04-20 NOTE — CONSULT NOTE ADULT - CONSULT REASON
Chest pain S/P stent placement
unrelieved chest and epigastric pain when hiccuping or eating
Esophageal stricture

## 2022-04-20 NOTE — CHART NOTE - NSCHARTNOTEFT_GEN_A_CORE
Pt seen for severe malnutrition follow up.    Medical Course:  - 62 yo M with hx of remote ETOH use, chronic pancreatitis, esophagitis, HCV s/p treatment, CKD4, initially presented to [facility] with abdominal pain found to have esophageal stricture on EGD transferred to [facility] for advanced GI evaluation.  - . S/p EGD w/ stent placement 4/19      Nutrition Course:    Diet Prescription: Diet, Pureed:   Supplement Feeding Modality:  Oral  Nepro Cans or Servings Per Day:  2       Frequency:  Two Times a day (04-20-22 @ 08:08)    Pertinent Medications: MEDICATIONS  (STANDING):  bisacodyl Suppository 10 milliGRAM(s) Rectal daily  chlorproMAZINE    Tablet 10 milliGRAM(s) Oral every 8 hours  folic acid 1 milliGRAM(s) Oral daily  gabapentin Solution 200 milliGRAM(s) Oral at bedtime  heparin   Injectable 5000 Unit(s) SubCutaneous every 8 hours  lactated ringers. 500 milliLiter(s) (500 mL/Hr) IV Continuous <Continuous>  lactated ringers. 500 milliLiter(s) (1000 mL/Hr) IV Continuous <Continuous>  multivitamin 1 Tablet(s) Oral daily  mupirocin 2% Ointment 1 Application(s) Both Nostrils two times a day  pancrelipase  (CREON  6,000 Lipase Units) 2 Capsule(s) Oral three times a day with meals  pantoprazole   Suspension 40 milliGRAM(s) Oral two times a day  polyethylene glycol 3350 17 Gram(s) Oral two times a day  potassium chloride  10 mEq/100 mL IVPB 10 milliEquivalent(s) IV Intermittent every 1 hour  senna 2 Tablet(s) Oral at bedtime  sucralfate 1 Gram(s) Oral every 6 hours  tamsulosin 0.4 milliGRAM(s) Oral at bedtime  thiamine 100 milliGRAM(s) Oral daily    MEDICATIONS  (PRN):  cyclobenzaprine 5 milliGRAM(s) Oral three times a day PRN Muscle Spasm  LORazepam   Injectable 1 milliGRAM(s) IV Push once PRN Anxiety  melatonin 3 milliGRAM(s) Oral at bedtime PRN Insomnia  oxyCODONE    Solution 10 milliGRAM(s) Oral every 4 hours PRN Severe Pain (7 - 10)    Pertinent Labs: 04-20 Na143 mmol/L Glu 115 mg/dL<H> K+ 3.3 mmol/L<L> Cr  2.72 mg/dL<H> BUN 25 mg/dL<H> 04-20 Phos 2.3 mg/dL<L> 04-20 Alb 3.1 g/dL<L>     CAPILLARY BLOOD GLUCOSE          Weight: Weight (kg): 57.3 (04-19 @ 09:17)  Weight Assessment:  Height: in / cm  IBW: lbs / kg +/-10%  BMI: kg/m^2    Physical Assessment, per flowsheets:  Edema:  Pressure Injury:  Appearance:     Estimated Needs:   [X] No change since previous assessment, based on dosing weight  lbs / kg   kcal daily @ kcal/kg,  gm protein daily @ gm/kg   [ ] recalculated, with consideration for, based on weight    Previous Nutrition Diagnosis:   [ ] Inadequate Energy Intake [ ]Inadequate Oral Intake [ ] Excessive Energy Intake   [ ] Underweight [ ] Increased Nutrient Needs [ ] Overweight/Obesity   [ ] Altered GI Function [ ] Unintended Weight Loss [ ] Food & Nutrition Related Knowledge Deficit [ ] Malnutrition   Nutrition Diagnosis is [ ] ongoing  [ ] resolved [ ] not applicable   New Nutrition Diagnosis: [ ] not applicable     Interventions:   1)   2)  3)     Monitor & Evaluate:  PO intake, tolerance to diet/supplement, nutrition related lab values, weight trends, BMs/GI distress, hydration status, skin integrity.  Esperanza Das RDN, CDN #[id number] Pt seen for severe malnutrition follow up.    Medical Course:  - Per chart, pt is 61 year old male PMHx remote EtOH use, chronic pancreatitis, esophagitis, HCV s/p treatment, CKD 4 initially presented to [facility] with abdominal pain found to have esophageal stricture on EGD transferred to [facility] for advanced GI evaluation. Now s/p EGD with stent placement (4/19).     Nutrition Course:  - Pt did not participate in discussion. Multiple tray items visible at bedside during time of visit.  - Pt currently ordered for pureed diet per GI. Of note, pt previously refused pureed diet and was then ordered for regular solids.   - Pt with continued hiccups and abdominal pain, possibly in the setting of chronic pancreatitis. Ordered for CREON 6,000 Lipase Units 2 capsules TID.   - Noted with constipation, requiring manual disimpaction. Pt ordered for bowel regimen (bisacodyl Suppository 10 mg qD, Miralax 17 gm BID, senna 2 tablets qHS).   - Labs notable for low K+/P, repleted.    Diet Prescription:   - Pureed  - Nepro 2 PO 2x daily    Pertinent Medications:   - bisacodyl Suppository, folic acid, lactated ringers IV Continuous, multivitamin, CREON, pantoprazole Suspension, polyethylene glycol, potassium chloride IVPB, senna, sucralfate, thiamine    Pertinent Labs:   - (4/20) Na 143 mmol/L Glu 115 mg/dL<H> K+ 3.3 mmol/L<L> Cr 2.72 mg/dL<H> BUN 25 mg/dL<H> Phos 2.3 mg/dL<L> Alb 3.1 g/dL<L>    Weight: (4/14 dosing) 126.3 lbs / 57.3 kg  Height: 74 in / 188.0 cm  IBW: 190 lbs / 86.4 kg +/-10%  BMI: 16.2 kg/m^2    Physical Assessment, per flowsheets:  Edema/Pressure Injury: none noted    Estimated Needs:   [X] No change since previous assessment, based on dosing weight 126.3 lbs / 57.3 kg  2698-3862 kcal daily @25-30 kcal/kg, 68.76-85.95 gm protein daily @1.2-1.5 gm/kg     Previous Nutrition Diagnosis: [X] Severe malnutrition in the context of chronic illness, remains appropriate  New Nutrition Diagnosis: [X] not applicable     Interventions:   1) PO diet texture per medical discretion.  2) Recommend d/c Nepro and add Ensure Enlive 1 PO 3x daily (provides 350 kcal, 20 gm protein per 8oz serving).  3) Monitor BMs, adjust bowel regimen as appropriate.  4) May continue micronutrient supplementation.   5) Obtain weekly weights.    Monitor & Evaluate:  PO intake, tolerance to diet/supplement, nutrition related lab values, weight trends, GI distress, hydration status, skin integrity.  Esperanza Das RDN, CDN #[id number]

## 2022-04-20 NOTE — PROGRESS NOTE ADULT - PROBLEM SELECTOR PLAN 1
Patient found to have esophageal stricture 35cm from incisure, unable to pass EGD scope on 4/8. Transferred to [facility] for advanced GI eval.  -per advanced GI, f/u infectious workup prior to intervention, now s/p EGD with stent yesterday 4/19, will increase to pureed diet today  -Aspiration precautions, head of bed elevation  -per EGD report, restriction characterized as benign, biopsy obtained; will proceed with MRI abdomen with IV contrast to r/o any other underling malignancy; stricture may be 2/2 chronic esophogitis  -c/w PPI PO for total of 8 weeks on BID dosing, then daily  -will reconsult pain management for recommendations given persistent pain, no improvement on oxycodone Patient found to have esophageal stricture 35cm from incisure, unable to pass EGD scope on 4/8. Transferred to [facility] for advanced GI eval.  -per advanced GI, f/u infectious workup prior to intervention, now s/p EGD with stent yesterday 4/19, will increase to pureed diet today  -Aspiration precautions, head of bed elevation  -per EGD report, restriction characterized as benign, biopsy obtained; will proceed with MRI abdomen with IV contrast to r/o any other underling malignancy; stricture may be 2/2 chronic esophogitis  -c/w PPI PO for total of 8 weeks on BID dosing, then daily  -will reconsult pain management for recommendations given persistent pain, no improvement on oxycodone  -patient with worsened epigastric pain today, new leukocytosis, f/u CXR/AXR to r/o perforation, continue to monitor exam

## 2022-04-20 NOTE — CONSULT NOTE ADULT - SUBJECTIVE AND OBJECTIVE BOX
Patient is a 61y old  Male who presents with a chief complaint of Esophageal stricture (20 Apr 2022 11:04)      HPI:  60 yo M with hx of remote ETOH use, chronic pancreatitis, esophagitis, HCV, CKD4, initially presented to [facility] with abdominal pain. Underwent EGD 4/8 with findings of esophageal structure at 35cm from incisors, with inability to pass scope. He was transferred to [facility] for advanced gastroenterology evaluation. Patient seen and examined at bedside reports diffuse epigastric abdominal pain with 1 episode of nausea and emesis after taking 'yellow pill', per chart review appears to be 4mg dilaudid. Patient reports chronic hiccups for 'years' and recent weight loss reporting baseline weight ~215 pounds, weight on admission 126.3#. On arrival patient was febrile and tachycardic without acute complaints from baseline chronic issues.    ED Course:  Tm 39.3, ->71, /54, SpO2 98% on RA  Febrile and tachycardic on arrival to [facility]. Cultured and given 1g tylenol, 1 dose zosyn. CXR without focal consolidation. (14 Apr 2022 01:56)      PAST MEDICAL & SURGICAL HISTORY:  ETOH abuse    Pancreatitis    Hepatitis C  treated    Gout    Gastric perforation        MEDICATIONS  (STANDING):  bisacodyl Suppository 10 milliGRAM(s) Rectal daily  folic acid 1 milliGRAM(s) Oral daily  gabapentin Solution 200 milliGRAM(s) Oral at bedtime  heparin   Injectable 5000 Unit(s) SubCutaneous every 8 hours  lactated ringers. 500 milliLiter(s) (1000 mL/Hr) IV Continuous <Continuous>  lactated ringers. 500 milliLiter(s) (500 mL/Hr) IV Continuous <Continuous>  lactulose Syrup 20 Gram(s) Oral daily  multivitamin 1 Tablet(s) Oral daily  mupirocin 2% Ointment 1 Application(s) Both Nostrils two times a day  pancrelipase  (CREON  6,000 Lipase Units) 2 Capsule(s) Oral three times a day with meals  pantoprazole   Suspension 40 milliGRAM(s) Oral two times a day  polyethylene glycol 3350 17 Gram(s) Oral two times a day  potassium chloride  10 mEq/100 mL IVPB 10 milliEquivalent(s) IV Intermittent every 1 hour  senna 2 Tablet(s) Oral at bedtime  sucralfate 1 Gram(s) Oral every 6 hours  tamsulosin 0.4 milliGRAM(s) Oral at bedtime  thiamine 100 milliGRAM(s) Oral daily    MEDICATIONS  (PRN):  chlorproMAZINE    Tablet 10 milliGRAM(s) Oral every 8 hours PRN hiccuping  cyclobenzaprine 5 milliGRAM(s) Oral three times a day PRN Muscle Spasm  LORazepam   Injectable 1 milliGRAM(s) IV Push once PRN Anxiety  melatonin 3 milliGRAM(s) Oral at bedtime PRN Insomnia  oxyCODONE    Solution 10 milliGRAM(s) Oral every 4 hours PRN Severe Pain (7 - 10)      ICU Vital Signs Last 24 Hrs  T(C): 36.8 (20 Apr 2022 11:06), Max: 36.8 (20 Apr 2022 11:06)  T(F): 98.2 (20 Apr 2022 11:06), Max: 98.2 (20 Apr 2022 11:06)  HR: 77 (20 Apr 2022 11:06) (70 - 80)  BP: 148/76 (20 Apr 2022 11:06) (148/76 - 161/86)  BP(mean): --  ABP: --  ABP(mean): --  RR: 16 (20 Apr 2022 11:06) (16 - 17)  SpO2: 96% (20 Apr 2022 11:06) (96% - 100%)      Vital Signs Last 24 Hrs  T(C): 36.8 (20 Apr 2022 11:06), Max: 36.8 (20 Apr 2022 11:06)  T(F): 98.2 (20 Apr 2022 11:06), Max: 98.2 (20 Apr 2022 11:06)  HR: 77 (20 Apr 2022 11:06) (70 - 80)  BP: 148/76 (20 Apr 2022 11:06) (148/76 - 161/86)  BP(mean): --  RR: 16 (20 Apr 2022 11:06) (16 - 17)  SpO2: 96% (20 Apr 2022 11:06) (96% - 100%)                          10.6   11.87 )-----------( 326      ( 20 Apr 2022 05:46 )             33.8       LIVER FUNCTIONS - ( 20 Apr 2022 05:46 )  Alb: 3.1 g/dL / Pro: 6.2 g/dL / ALK PHOS: 84 U/L / ALT: 16 U/L / AST: 9 U/L / GGT: x           SUMMARY:  Patient seen at bedside. Complaining of pain in his chest. Patient states he gets some relief in pain when he gets the Thorazine and the Oxycodone and reports that the Oxycodone helps him for 2 1/2 hours. Patient states his pain did not get worse or changed since he got the stent. Patient states he was not aware of the Flexeril orders and did not request Flexeril. Patient alert and oriented. Patient able to swallow and eat without difficulty. Patient with frequent hiccups.          Patient is a 61y old  Male who presents with a chief complaint of Esophageal stricture (20 Apr 2022 11:04)      HPI:  62 yo M with hx of remote ETOH use, chronic pancreatitis, esophagitis, HCV, CKD4, initially presented to [facility] with abdominal pain. Underwent EGD 4/8 with findings of esophageal structure at 35cm from incisors, with inability to pass scope. He was transferred to [facility] for advanced gastroenterology evaluation. Patient seen and examined at bedside reports diffuse epigastric abdominal pain with 1 episode of nausea and emesis after taking 'yellow pill', per chart review appears to be 4mg dilaudid. Patient reports chronic hiccups for 'years' and recent weight loss reporting baseline weight ~215 pounds, weight on admission 126.3#. On arrival patient was febrile and tachycardic without acute complaints from baseline chronic issues.    ED Course:  Tm 39.3, ->71, /54, SpO2 98% on RA  Febrile and tachycardic on arrival to [facility]. Cultured and given 1g tylenol, 1 dose zosyn. CXR without focal consolidation. (14 Apr 2022 01:56)      PAST MEDICAL & SURGICAL HISTORY:  ETOH abuse    Pancreatitis    Hepatitis C  treated    Gout    Gastric perforation        MEDICATIONS  (STANDING):  bisacodyl Suppository 10 milliGRAM(s) Rectal daily  folic acid 1 milliGRAM(s) Oral daily  gabapentin Solution 200 milliGRAM(s) Oral at bedtime  heparin   Injectable 5000 Unit(s) SubCutaneous every 8 hours  lactated ringers. 500 milliLiter(s) (1000 mL/Hr) IV Continuous <Continuous>  lactated ringers. 500 milliLiter(s) (500 mL/Hr) IV Continuous <Continuous>  lactulose Syrup 20 Gram(s) Oral daily  multivitamin 1 Tablet(s) Oral daily  mupirocin 2% Ointment 1 Application(s) Both Nostrils two times a day  pancrelipase  (CREON  6,000 Lipase Units) 2 Capsule(s) Oral three times a day with meals  pantoprazole   Suspension 40 milliGRAM(s) Oral two times a day  polyethylene glycol 3350 17 Gram(s) Oral two times a day  potassium chloride  10 mEq/100 mL IVPB 10 milliEquivalent(s) IV Intermittent every 1 hour  senna 2 Tablet(s) Oral at bedtime  sucralfate 1 Gram(s) Oral every 6 hours  tamsulosin 0.4 milliGRAM(s) Oral at bedtime  thiamine 100 milliGRAM(s) Oral daily    MEDICATIONS  (PRN):  chlorproMAZINE    Tablet 10 milliGRAM(s) Oral every 8 hours PRN hiccuping  cyclobenzaprine 5 milliGRAM(s) Oral three times a day PRN Muscle Spasm  LORazepam   Injectable 1 milliGRAM(s) IV Push once PRN Anxiety  melatonin 3 milliGRAM(s) Oral at bedtime PRN Insomnia  oxyCODONE    Solution 10 milliGRAM(s) Oral every 4 hours PRN Severe Pain (7 - 10)      ICU Vital Signs Last 24 Hrs  T(C): 36.8 (20 Apr 2022 11:06), Max: 36.8 (20 Apr 2022 11:06)  T(F): 98.2 (20 Apr 2022 11:06), Max: 98.2 (20 Apr 2022 11:06)  HR: 77 (20 Apr 2022 11:06) (70 - 80)  BP: 148/76 (20 Apr 2022 11:06) (148/76 - 161/86)  BP(mean): --  ABP: --  ABP(mean): --  RR: 16 (20 Apr 2022 11:06) (16 - 17)  SpO2: 96% (20 Apr 2022 11:06) (96% - 100%)      Vital Signs Last 24 Hrs  T(C): 36.8 (20 Apr 2022 11:06), Max: 36.8 (20 Apr 2022 11:06)  T(F): 98.2 (20 Apr 2022 11:06), Max: 98.2 (20 Apr 2022 11:06)  HR: 77 (20 Apr 2022 11:06) (70 - 80)  BP: 148/76 (20 Apr 2022 11:06) (148/76 - 161/86)  BP(mean): --  RR: 16 (20 Apr 2022 11:06) (16 - 17)  SpO2: 96% (20 Apr 2022 11:06) (96% - 100%)                          10.6   11.87 )-----------( 326      ( 20 Apr 2022 05:46 )             33.8       LIVER FUNCTIONS - ( 20 Apr 2022 05:46 )  Alb: 3.1 g/dL / Pro: 6.2 g/dL / ALK PHOS: 84 U/L / ALT: 16 U/L / AST: 9 U/L / GGT: x           SUMMARY:  Patient seen at bedside. Complaining of pain in his chest. Patient states he gets some relief in pain when he gets the Thorazine and the Oxycodone and reports that the Oxycodone helps him for 2 1/2 hours. Patient states his pain did not get worse or changed since he got the stent. Patient states he was not aware of the Flexeril orders and did not request Flexeril. Patient alert and oriented. Patient able to swallow and eat without difficulty. Patient with frequent hiccups. Patient alert and orientedx4. Answers questions appropriately. Not in any apprent distress.  RECOMMENDATIONS:  1) Consider PO Acetaminophen 650mg Q6H standing x2 days, then PRN for pain. Not to be given within 6 hours of last dose of Acetaminophen.  2) Consider discontinuing current orders for PO Flexeril instead order PO Flexeril 5mg standing Q8H. Hold for over sedation.  3) Consider changing PO Gabapentin to 300mg at bedtime. Hold for over sedation.  4) Consider continuing current orders for PO Oxycodone. Hold for over sedation. Not to be given within one hour of any immediate acting opioid.  5) Consider IV Dilaudid 0.5mg Q4H PRN for severe breakthrough pain. Hold for over sedation. Not to be given within one hour of any immediate acting opioid. ONLY TO  BE GIVEN FOR PAIN NOT RELIEVED WITH PO OXYCODONE.  6) Recommend follow up with a chronic pain management upon discharge.  Discussed patient with Anesthesia pain attending who agrees with the plan.

## 2022-04-20 NOTE — PROGRESS NOTE ADULT - ATTENDING COMMENTS
Patient seen and examined, care plan discussed with house staff as above.    61yoM w hx of chronic pancreatitis (2/2 ?remote etoh use), CKD Stage IV, HCV (s/p 8 weeks treatment, unclear when), hx of gastric perf w peritonitis and MSSA bacteremia in 2019 requiring ex lap and prolonged hospitalization, 100 lb weight loss unintentionally, transferred from  with esophageal stricture w intractable hiccups and epigastric pain (persists); in need of advanced GI specialist as scope could not be advanced in prior EGD at  4/6. Pt denies dysphagia, described ongoing pain of esophagus.     [facility] Infectious work up neg-- CXR without infiltrate, though patient is high risk for aspiration event, blood cultures, urine cultures NGTD, HIV neg 4/6, no evidence of necrotizing pancreatitis on CT (continues to show pancreatic ductal dilation). Review of prior CT ap, however patient has not had CT chest to eval esophagus/surrounding structures since Dec 2021 at which time he had CT w oral contrast (see above). Note lytic lesion of iliac crest L side on CT ap 4/6, stable from prior imaging.  Prior EGD with biopsy Jan 2022 showed gastritis/esophagitis, biopsy obtained at  4/6 w path concerning for pill esophagitis, H pylori neg on initial EGD bx in Dec 2021. CEA 4.5 in 2019. F/u CA-19-9 (high 152) and CEA (nml 3.7) this admission. Baseline EKG 4/14 nonischemic.     Esophageal stricture w esophagitis- Cont PPI BID, transitioned to solution 4/19. GI w EGD Tues 4/19, esophageal stent placed, bx sent (results pending); needs f/u EGD in 3-4 weeks for removal of stent. Ongoing pain related to esophagitis--> transition all pill meds to solution. Cont carafate, PPI. Pt declines dilaudid, wants morphine (would not give iso CKD IV). Solution oxycodone is improving pain but still not resolved, dilaudid for breakthrough pain. MRI chest/abd/pelv ordered, pending results (malignancy work up). Pt consented. Chest pain ongoing issue even post stent placement. Repeat EKG nonischemic 4/20, CXR, abd XR to r/o free air in abd on 4/20.     Constipation- treat aggressively w BID miralax/senna and daily enema until BM. (Pt has been refusing enema).    Hypernatremia, worsening 4/19- Suspect 2/2 decr oral itnake (NPO pre procedure 4/19). Trial of IVF, improved.

## 2022-04-20 NOTE — PROGRESS NOTE ADULT - PROBLEM SELECTOR PLAN 4
Patient with large stool burden, unable to pass stool without manual extraction  -c/w senna, Miralax, dulcolax suppository, enemas prn Patient with large stool burden, unable to pass stool without manual extraction  -c/w senna, Miralax, dulcolax suppository, enemas prn, will add lactulose by mouth  -Linzess in setting of opioid use?  -consider malignant process, GI consult for role of colonoscopy

## 2022-04-20 NOTE — PROGRESS NOTE ADULT - PROBLEM SELECTOR PLAN 3
pt reports 10/10 chest pain - likely in the setting of esophagitis/esophageal stricture with extrinsic compression   -low suspicion for cardiac etiology - EKG: without ischemic changes, troponin negative   -pt refusing dilaudid/tylenol, requesting morphine - however given CKD, not a candidate  - pain management rec oxy 10mg q4h prn, tylenol 650mg ATC x2 days, gabapentin renally dosed, and flexeril prn  - Reglan 10mg TID switched to thorazine 10mg TID for intractable hiccups 4/16; to decrease pill burden, will switch back to reglan.  -will reconsult pain management as above

## 2022-04-20 NOTE — PROGRESS NOTE ADULT - ASSESSMENT
60 yo M with hx of remote ETOH use, chronic pancreatitis, esophagitis, HCV s/p treatment, CKD4, initially presented to [facility] with abdominal pain found to have esophageal stricture on EGD transferred to [facility] for advanced GI evaluation.

## 2022-04-20 NOTE — PROGRESS NOTE ADULT - SUBJECTIVE AND OBJECTIVE BOX
ANESTHESIA POSTOP CHECK    61y Male POSTOP DAY 1 S/P EGD stent placement    Vital Signs Last 24 Hrs  T(C): 36.6 (20 Apr 2022 05:32), Max: 36.9 (19 Apr 2022 13:15)  T(F): 97.9 (20 Apr 2022 05:32), Max: 98.4 (19 Apr 2022 13:15)  HR: 80 (20 Apr 2022 05:32) (68 - 80)  BP: 152/78 (20 Apr 2022 05:32) (149/71 - 161/86)  BP(mean): --  RR: 17 (20 Apr 2022 05:32) (17 - 22)  SpO2: 100% (20 Apr 2022 05:32) (100% - 100%)  I&O's Summary      [X NO APPARENT ANESTHESIA COMPLICATIONS      Comments:

## 2022-04-20 NOTE — PROGRESS NOTE ADULT - SUBJECTIVE AND OBJECTIVE BOX
Authored by Neris Galaviz MD, PGY2  PATIENT:  IMELDA ROBERTSON  [id number]    CHIEF COMPLAINT:  Patient is a 61y old  Male who presents with a chief complaint of Esophageal stricture (20 Apr 2022 07:42)      INTERVAL HISTORY OVERNIGHT EVENTS: YOSSI overnight. Patient c/o persistent epigastric pain, hiccuping, no change post-procedure.         MEDICATIONS:  MEDICATIONS  (STANDING):  bisacodyl Suppository 10 milliGRAM(s) Rectal daily  folic acid 1 milliGRAM(s) Oral daily  gabapentin Solution 200 milliGRAM(s) Oral at bedtime  heparin   Injectable 5000 Unit(s) SubCutaneous every 8 hours  lactated ringers. 500 milliLiter(s) (1000 mL/Hr) IV Continuous <Continuous>  lactated ringers. 500 milliLiter(s) (500 mL/Hr) IV Continuous <Continuous>  magnesium sulfate  IVPB 1 Gram(s) IV Intermittent once  multivitamin 1 Tablet(s) Oral daily  mupirocin 2% Ointment 1 Application(s) Both Nostrils two times a day  pancrelipase  (CREON  6,000 Lipase Units) 2 Capsule(s) Oral three times a day with meals  pantoprazole   Suspension 40 milliGRAM(s) Oral two times a day  polyethylene glycol 3350 17 Gram(s) Oral two times a day  potassium chloride   Solution 40 milliEquivalent(s) Oral once  potassium chloride  10 mEq/100 mL IVPB 10 milliEquivalent(s) IV Intermittent every 1 hour  potassium phosphate / sodium phosphate Powder (PHOS-NaK) 1 Packet(s) Oral once  senna 2 Tablet(s) Oral at bedtime  sucralfate 1 Gram(s) Oral every 6 hours  tamsulosin 0.4 milliGRAM(s) Oral at bedtime  thiamine 100 milliGRAM(s) Oral daily    MEDICATIONS  (PRN):  cyclobenzaprine 5 milliGRAM(s) Oral three times a day PRN Muscle Spasm  LORazepam   Injectable 1 milliGRAM(s) IV Push once PRN Anxiety  melatonin 3 milliGRAM(s) Oral at bedtime PRN Insomnia  metoclopramide Injectable 5 milliGRAM(s) IV Push every 8 hours PRN hiccuping  oxyCODONE    Solution 10 milliGRAM(s) Oral every 4 hours PRN Severe Pain (7 - 10)      ALLERGIES:  Allergies    No Known Allergies    Intolerances        OBJECTIVE:  ICU Vital Signs Last 24 Hrs  T(C): 36.6 (20 Apr 2022 05:32), Max: 36.9 (19 Apr 2022 13:15)  T(F): 97.9 (20 Apr 2022 05:32), Max: 98.4 (19 Apr 2022 13:15)  HR: 80 (20 Apr 2022 05:32) (67 - 80)  BP: 152/78 (20 Apr 2022 05:32) (127/73 - 161/86)  BP(mean): --  ABP: --  ABP(mean): --  RR: 17 (20 Apr 2022 05:32) (17 - 22)  SpO2: 100% (20 Apr 2022 05:32) (100% - 100%)            I&O's Summary    Daily Height in cm: 187.96 (19 Apr 2022 08:45)    Daily     PHYSICAL EXAMINATION:  GENERAL: Frail thin man lying in bed  HEAD:  Atraumatic, Normocephalic  EYES: EOMI, PERRLA, conjunctiva and sclera clear  ENT: Moist mucous membranes  NECK: Supple, No JVD  CHEST/LUNG: Clear to auscultation bilaterally; No rales, rhonchi, wheezing, or rubs. Unlabored respirations  HEART: Regular rate and rhythm; No murmurs, rubs, or gallops  ABDOMEN: BSx4; Soft, tender to palpation in epigastrium, no guarding, hiccuping  EXTREMITIES:  1+ Peripheral Pulses, brisk capillary refill. long toenails  NERVOUS SYSTEM:  A&Ox3, no focal deficits   SKIN: No rashes or lesions    LABS:                          10.6   11.87 )-----------( 326      ( 20 Apr 2022 05:46 )             33.8     04-20    143  |  112<H>  |  25<H>  ----------------------------<  115<H>  3.3<L>   |  17<L>  |  2.72<H>    Ca    8.7      20 Apr 2022 05:46  Phos  2.3     04-20  Mg     1.90     04-20    TPro  6.2  /  Alb  3.1<L>  /  TBili  <0.2  /  DBili  x   /  AST  9   /  ALT  16  /  AlkPhos  84  04-20    LIVER FUNCTIONS - ( 20 Apr 2022 05:46 )  Alb: 3.1 g/dL / Pro: 6.2 g/dL / ALK PHOS: 84 U/L / ALT: 16 U/L / AST: 9 U/L / GGT: x                       TELEMETRY:     EKG:     IMAGING:       Authored by Neris Galaviz MD, PGY2  PATIENT:  IMELDA ROBERTSON  [id number]    CHIEF COMPLAINT:  Patient is a 61y old  Male who presents with a chief complaint of Esophageal stricture (20 Apr 2022 07:42)      INTERVAL HISTORY OVERNIGHT EVENTS: YOSSI overnight. Patient c/o persistent epigastric pain, hiccuping, no change post-procedure. Patient states he "feels like the food is flowing," better.          MEDICATIONS:  MEDICATIONS  (STANDING):  bisacodyl Suppository 10 milliGRAM(s) Rectal daily  folic acid 1 milliGRAM(s) Oral daily  gabapentin Solution 200 milliGRAM(s) Oral at bedtime  heparin   Injectable 5000 Unit(s) SubCutaneous every 8 hours  lactated ringers. 500 milliLiter(s) (1000 mL/Hr) IV Continuous <Continuous>  lactated ringers. 500 milliLiter(s) (500 mL/Hr) IV Continuous <Continuous>  magnesium sulfate  IVPB 1 Gram(s) IV Intermittent once  multivitamin 1 Tablet(s) Oral daily  mupirocin 2% Ointment 1 Application(s) Both Nostrils two times a day  pancrelipase  (CREON  6,000 Lipase Units) 2 Capsule(s) Oral three times a day with meals  pantoprazole   Suspension 40 milliGRAM(s) Oral two times a day  polyethylene glycol 3350 17 Gram(s) Oral two times a day  potassium chloride   Solution 40 milliEquivalent(s) Oral once  potassium chloride  10 mEq/100 mL IVPB 10 milliEquivalent(s) IV Intermittent every 1 hour  potassium phosphate / sodium phosphate Powder (PHOS-NaK) 1 Packet(s) Oral once  senna 2 Tablet(s) Oral at bedtime  sucralfate 1 Gram(s) Oral every 6 hours  tamsulosin 0.4 milliGRAM(s) Oral at bedtime  thiamine 100 milliGRAM(s) Oral daily    MEDICATIONS  (PRN):  cyclobenzaprine 5 milliGRAM(s) Oral three times a day PRN Muscle Spasm  LORazepam   Injectable 1 milliGRAM(s) IV Push once PRN Anxiety  melatonin 3 milliGRAM(s) Oral at bedtime PRN Insomnia  metoclopramide Injectable 5 milliGRAM(s) IV Push every 8 hours PRN hiccuping  oxyCODONE    Solution 10 milliGRAM(s) Oral every 4 hours PRN Severe Pain (7 - 10)      ALLERGIES:  Allergies    No Known Allergies    Intolerances        OBJECTIVE:  ICU Vital Signs Last 24 Hrs  T(C): 36.6 (20 Apr 2022 05:32), Max: 36.9 (19 Apr 2022 13:15)  T(F): 97.9 (20 Apr 2022 05:32), Max: 98.4 (19 Apr 2022 13:15)  HR: 80 (20 Apr 2022 05:32) (67 - 80)  BP: 152/78 (20 Apr 2022 05:32) (127/73 - 161/86)  BP(mean): --  ABP: --  ABP(mean): --  RR: 17 (20 Apr 2022 05:32) (17 - 22)  SpO2: 100% (20 Apr 2022 05:32) (100% - 100%)            I&O's Summary    Daily Height in cm: 187.96 (19 Apr 2022 08:45)    Daily     PHYSICAL EXAMINATION:  GENERAL: Frail thin man lying in bed  HEAD:  Atraumatic, Normocephalic  EYES: EOMI, PERRLA, conjunctiva and sclera clear  ENT: Moist mucous membranes  NECK: Supple, No JVD  CHEST/LUNG: Clear to auscultation bilaterally; No rales, rhonchi, wheezing, or rubs. Unlabored respirations  HEART: Regular rate and rhythm; No murmurs, rubs, or gallops  ABDOMEN: BSx4; Soft, tender to palpation in epigastrium, no guarding, hiccuping  EXTREMITIES:  1+ Peripheral Pulses, brisk capillary refill. long toenails  NERVOUS SYSTEM:  A&Ox3, no focal deficits   SKIN: No rashes or lesions    LABS:                          10.6   11.87 )-----------( 326      ( 20 Apr 2022 05:46 )             33.8     04-20    143  |  112<H>  |  25<H>  ----------------------------<  115<H>  3.3<L>   |  17<L>  |  2.72<H>    Ca    8.7      20 Apr 2022 05:46  Phos  2.3     04-20  Mg     1.90     04-20    TPro  6.2  /  Alb  3.1<L>  /  TBili  <0.2  /  DBili  x   /  AST  9   /  ALT  16  /  AlkPhos  84  04-20    LIVER FUNCTIONS - ( 20 Apr 2022 05:46 )  Alb: 3.1 g/dL / Pro: 6.2 g/dL / ALK PHOS: 84 U/L / ALT: 16 U/L / AST: 9 U/L / GGT: x                       TELEMETRY:     EKG:     IMAGING:

## 2022-04-21 LAB
ALBUMIN SERPL ELPH-MCNC: 2.8 G/DL — LOW (ref 3.3–5)
ALP SERPL-CCNC: 83 U/L — SIGNIFICANT CHANGE UP (ref 40–120)
ALT FLD-CCNC: 10 U/L — SIGNIFICANT CHANGE UP (ref 4–41)
ANION GAP SERPL CALC-SCNC: 11 MMOL/L — SIGNIFICANT CHANGE UP (ref 7–14)
APPEARANCE UR: CLEAR — SIGNIFICANT CHANGE UP
AST SERPL-CCNC: 8 U/L — SIGNIFICANT CHANGE UP (ref 4–40)
BACTERIA # UR AUTO: ABNORMAL
BASE EXCESS BLDV CALC-SCNC: -3.7 MMOL/L — LOW (ref -2–3)
BASOPHILS # BLD AUTO: 0.05 K/UL — SIGNIFICANT CHANGE UP (ref 0–0.2)
BASOPHILS NFR BLD AUTO: 0.3 % — SIGNIFICANT CHANGE UP (ref 0–2)
BILIRUB SERPL-MCNC: 0.2 MG/DL — SIGNIFICANT CHANGE UP (ref 0.2–1.2)
BILIRUB UR-MCNC: NEGATIVE — SIGNIFICANT CHANGE UP
BLOOD GAS VENOUS COMPREHENSIVE RESULT: SIGNIFICANT CHANGE UP
BUN SERPL-MCNC: 22 MG/DL — SIGNIFICANT CHANGE UP (ref 7–23)
CALCIUM SERPL-MCNC: 8.6 MG/DL — SIGNIFICANT CHANGE UP (ref 8.4–10.5)
CHLORIDE BLDV-SCNC: 111 MMOL/L — HIGH (ref 96–108)
CHLORIDE SERPL-SCNC: 112 MMOL/L — HIGH (ref 98–107)
CO2 BLDV-SCNC: 23.5 MMOL/L — SIGNIFICANT CHANGE UP (ref 22–26)
CO2 SERPL-SCNC: 19 MMOL/L — LOW (ref 22–31)
COLOR SPEC: YELLOW — SIGNIFICANT CHANGE UP
CREAT SERPL-MCNC: 2.58 MG/DL — HIGH (ref 0.5–1.3)
DIFF PNL FLD: NEGATIVE — SIGNIFICANT CHANGE UP
EGFR: 27 ML/MIN/1.73M2 — LOW
EOSINOPHIL # BLD AUTO: 0.08 K/UL — SIGNIFICANT CHANGE UP (ref 0–0.5)
EOSINOPHIL NFR BLD AUTO: 0.5 % — SIGNIFICANT CHANGE UP (ref 0–6)
EPI CELLS # UR: 0 /HPF — SIGNIFICANT CHANGE UP (ref 0–5)
GAS PNL BLDV: 139 MMOL/L — SIGNIFICANT CHANGE UP (ref 136–145)
GLUCOSE BLDV-MCNC: 88 MG/DL — SIGNIFICANT CHANGE UP (ref 70–99)
GLUCOSE SERPL-MCNC: 85 MG/DL — SIGNIFICANT CHANGE UP (ref 70–99)
GLUCOSE UR QL: NEGATIVE — SIGNIFICANT CHANGE UP
HCO3 BLDV-SCNC: 22 MMOL/L — SIGNIFICANT CHANGE UP (ref 22–29)
HCT VFR BLD CALC: 30.1 % — LOW (ref 39–50)
HCT VFR BLDA CALC: 30 % — LOW (ref 39–51)
HGB BLD CALC-MCNC: 9.9 G/DL — LOW (ref 13–17)
HGB BLD-MCNC: 9.9 G/DL — LOW (ref 13–17)
HYALINE CASTS # UR AUTO: 2 /LPF — SIGNIFICANT CHANGE UP (ref 0–7)
IANC: 13.61 K/UL — HIGH (ref 1.8–7.4)
IMM GRANULOCYTES NFR BLD AUTO: 0.7 % — SIGNIFICANT CHANGE UP (ref 0–1.5)
KETONES UR-MCNC: NEGATIVE — SIGNIFICANT CHANGE UP
LACTATE BLDV-MCNC: 0.7 MMOL/L — SIGNIFICANT CHANGE UP (ref 0.5–2)
LEUKOCYTE ESTERASE UR-ACNC: NEGATIVE — SIGNIFICANT CHANGE UP
LYMPHOCYTES # BLD AUTO: 1.47 K/UL — SIGNIFICANT CHANGE UP (ref 1–3.3)
LYMPHOCYTES # BLD AUTO: 9.2 % — LOW (ref 13–44)
MAGNESIUM SERPL-MCNC: 1.9 MG/DL — SIGNIFICANT CHANGE UP (ref 1.6–2.6)
MCHC RBC-ENTMCNC: 29.6 PG — SIGNIFICANT CHANGE UP (ref 27–34)
MCHC RBC-ENTMCNC: 32.9 GM/DL — SIGNIFICANT CHANGE UP (ref 32–36)
MCV RBC AUTO: 89.9 FL — SIGNIFICANT CHANGE UP (ref 80–100)
MONOCYTES # BLD AUTO: 0.6 K/UL — SIGNIFICANT CHANGE UP (ref 0–0.9)
MONOCYTES NFR BLD AUTO: 3.8 % — SIGNIFICANT CHANGE UP (ref 2–14)
NEUTROPHILS # BLD AUTO: 13.61 K/UL — HIGH (ref 1.8–7.4)
NEUTROPHILS NFR BLD AUTO: 85.5 % — HIGH (ref 43–77)
NITRITE UR-MCNC: NEGATIVE — SIGNIFICANT CHANGE UP
NRBC # BLD: 0 /100 WBCS — SIGNIFICANT CHANGE UP
NRBC # FLD: 0 K/UL — SIGNIFICANT CHANGE UP
PCO2 BLDV: 43 MMHG — SIGNIFICANT CHANGE UP (ref 42–55)
PH BLDV: 7.32 — SIGNIFICANT CHANGE UP (ref 7.32–7.43)
PH UR: 6 — SIGNIFICANT CHANGE UP (ref 5–8)
PHOSPHATE SERPL-MCNC: 2.1 MG/DL — LOW (ref 2.5–4.5)
PLATELET # BLD AUTO: 342 K/UL — SIGNIFICANT CHANGE UP (ref 150–400)
PO2 BLDV: 48 MMHG — SIGNIFICANT CHANGE UP
POTASSIUM BLDV-SCNC: 3.8 MMOL/L — SIGNIFICANT CHANGE UP (ref 3.5–5.1)
POTASSIUM SERPL-MCNC: 3.9 MMOL/L — SIGNIFICANT CHANGE UP (ref 3.5–5.3)
POTASSIUM SERPL-SCNC: 3.9 MMOL/L — SIGNIFICANT CHANGE UP (ref 3.5–5.3)
PROT SERPL-MCNC: 5.8 G/DL — LOW (ref 6–8.3)
PROT UR-MCNC: ABNORMAL
RBC # BLD: 3.35 M/UL — LOW (ref 4.2–5.8)
RBC # FLD: 16.8 % — HIGH (ref 10.3–14.5)
RBC CASTS # UR COMP ASSIST: 0 /HPF — SIGNIFICANT CHANGE UP (ref 0–4)
SAO2 % BLDV: 81.1 % — SIGNIFICANT CHANGE UP
SODIUM SERPL-SCNC: 142 MMOL/L — SIGNIFICANT CHANGE UP (ref 135–145)
SP GR SPEC: 1.02 — SIGNIFICANT CHANGE UP (ref 1–1.05)
UROBILINOGEN FLD QL: SIGNIFICANT CHANGE UP
WBC # BLD: 15.92 K/UL — HIGH (ref 3.8–10.5)
WBC # FLD AUTO: 15.92 K/UL — HIGH (ref 3.8–10.5)
WBC UR QL: 2 /HPF — SIGNIFICANT CHANGE UP (ref 0–5)

## 2022-04-21 PROCEDURE — 99233 SBSQ HOSP IP/OBS HIGH 50: CPT

## 2022-04-21 RX ORDER — SODIUM,POTASSIUM PHOSPHATES 278-250MG
1 POWDER IN PACKET (EA) ORAL
Refills: 0 | Status: COMPLETED | OUTPATIENT
Start: 2022-04-21 | End: 2022-04-22

## 2022-04-21 RX ORDER — GABAPENTIN 400 MG/1
200 CAPSULE ORAL AT BEDTIME
Refills: 0 | Status: DISCONTINUED | OUTPATIENT
Start: 2022-04-21 | End: 2022-04-23

## 2022-04-21 RX ORDER — OXYCODONE HYDROCHLORIDE 5 MG/1
10 TABLET ORAL EVERY 4 HOURS
Refills: 0 | Status: DISCONTINUED | OUTPATIENT
Start: 2022-04-21 | End: 2022-04-22

## 2022-04-21 RX ORDER — CYCLOBENZAPRINE HYDROCHLORIDE 10 MG/1
5 TABLET, FILM COATED ORAL THREE TIMES A DAY
Refills: 0 | Status: DISCONTINUED | OUTPATIENT
Start: 2022-04-21 | End: 2022-04-23

## 2022-04-21 RX ADMIN — Medication 2 CAPSULE(S): at 13:26

## 2022-04-21 RX ADMIN — Medication 1 PACKET(S): at 17:51

## 2022-04-21 RX ADMIN — Medication 1 GRAM(S): at 07:04

## 2022-04-21 RX ADMIN — Medication 650 MILLIGRAM(S): at 11:18

## 2022-04-21 RX ADMIN — OXYCODONE HYDROCHLORIDE 10 MILLIGRAM(S): 5 TABLET ORAL at 00:55

## 2022-04-21 RX ADMIN — Medication 1 GRAM(S): at 11:16

## 2022-04-21 RX ADMIN — PANTOPRAZOLE SODIUM 40 MILLIGRAM(S): 20 TABLET, DELAYED RELEASE ORAL at 18:54

## 2022-04-21 RX ADMIN — GABAPENTIN 200 MILLIGRAM(S): 400 CAPSULE ORAL at 22:12

## 2022-04-21 RX ADMIN — Medication 10 MILLIGRAM(S): at 00:09

## 2022-04-21 RX ADMIN — Medication 650 MILLIGRAM(S): at 11:30

## 2022-04-21 RX ADMIN — POLYETHYLENE GLYCOL 3350 17 GRAM(S): 17 POWDER, FOR SOLUTION ORAL at 07:04

## 2022-04-21 RX ADMIN — Medication 1 MILLIGRAM(S): at 11:16

## 2022-04-21 RX ADMIN — Medication 650 MILLIGRAM(S): at 18:10

## 2022-04-21 RX ADMIN — Medication 2 CAPSULE(S): at 17:50

## 2022-04-21 RX ADMIN — LACTULOSE 20 GRAM(S): 10 SOLUTION ORAL at 11:17

## 2022-04-21 RX ADMIN — Medication 650 MILLIGRAM(S): at 07:51

## 2022-04-21 RX ADMIN — PANTOPRAZOLE SODIUM 40 MILLIGRAM(S): 20 TABLET, DELAYED RELEASE ORAL at 08:56

## 2022-04-21 RX ADMIN — SENNA PLUS 2 TABLET(S): 8.6 TABLET ORAL at 22:12

## 2022-04-21 RX ADMIN — OXYCODONE HYDROCHLORIDE 10 MILLIGRAM(S): 5 TABLET ORAL at 11:26

## 2022-04-21 RX ADMIN — MUPIROCIN 1 APPLICATION(S): 20 OINTMENT TOPICAL at 07:04

## 2022-04-21 RX ADMIN — TAMSULOSIN HYDROCHLORIDE 0.4 MILLIGRAM(S): 0.4 CAPSULE ORAL at 22:12

## 2022-04-21 RX ADMIN — CYCLOBENZAPRINE HYDROCHLORIDE 5 MILLIGRAM(S): 10 TABLET, FILM COATED ORAL at 07:07

## 2022-04-21 RX ADMIN — CYCLOBENZAPRINE HYDROCHLORIDE 5 MILLIGRAM(S): 10 TABLET, FILM COATED ORAL at 20:22

## 2022-04-21 RX ADMIN — Medication 3 MILLIGRAM(S): at 22:15

## 2022-04-21 RX ADMIN — HEPARIN SODIUM 5000 UNIT(S): 5000 INJECTION INTRAVENOUS; SUBCUTANEOUS at 07:03

## 2022-04-21 RX ADMIN — Medication 10 MILLIGRAM(S): at 16:25

## 2022-04-21 RX ADMIN — OXYCODONE HYDROCHLORIDE 10 MILLIGRAM(S): 5 TABLET ORAL at 17:00

## 2022-04-21 RX ADMIN — Medication 2 CAPSULE(S): at 08:56

## 2022-04-21 RX ADMIN — OXYCODONE HYDROCHLORIDE 10 MILLIGRAM(S): 5 TABLET ORAL at 12:00

## 2022-04-21 RX ADMIN — Medication 1 GRAM(S): at 17:44

## 2022-04-21 RX ADMIN — OXYCODONE HYDROCHLORIDE 10 MILLIGRAM(S): 5 TABLET ORAL at 16:25

## 2022-04-21 RX ADMIN — Medication 650 MILLIGRAM(S): at 07:03

## 2022-04-21 RX ADMIN — Medication 650 MILLIGRAM(S): at 17:50

## 2022-04-21 RX ADMIN — OXYCODONE HYDROCHLORIDE 10 MILLIGRAM(S): 5 TABLET ORAL at 20:23

## 2022-04-21 RX ADMIN — Medication 650 MILLIGRAM(S): at 00:11

## 2022-04-21 RX ADMIN — Medication 1 GRAM(S): at 00:09

## 2022-04-21 RX ADMIN — MUPIROCIN 1 APPLICATION(S): 20 OINTMENT TOPICAL at 17:58

## 2022-04-21 RX ADMIN — Medication 100 MILLIGRAM(S): at 11:16

## 2022-04-21 RX ADMIN — HEPARIN SODIUM 5000 UNIT(S): 5000 INJECTION INTRAVENOUS; SUBCUTANEOUS at 22:11

## 2022-04-21 RX ADMIN — HEPARIN SODIUM 5000 UNIT(S): 5000 INJECTION INTRAVENOUS; SUBCUTANEOUS at 13:26

## 2022-04-21 RX ADMIN — Medication 650 MILLIGRAM(S): at 00:55

## 2022-04-21 RX ADMIN — OXYCODONE HYDROCHLORIDE 10 MILLIGRAM(S): 5 TABLET ORAL at 21:20

## 2022-04-21 RX ADMIN — Medication 1 TABLET(S): at 11:16

## 2022-04-21 NOTE — PROGRESS NOTE ADULT - SUBJECTIVE AND OBJECTIVE BOX
Authored by Neris Galaviz MD, PGY2  PATIENT:  IMELDA ROBERTSON  [id number]    CHIEF COMPLAINT:  Patient is a 61y old  Male who presents with a chief complaint of Esophageal stricture (20 Apr 2022 13:20)      INTERVAL HISTORY OVERNIGHT EVENTS: YOSSI overnight.           MEDICATIONS:  MEDICATIONS  (STANDING):  acetaminophen     Tablet .. 650 milliGRAM(s) Oral every 6 hours  bisacodyl Suppository 10 milliGRAM(s) Rectal daily  cyclobenzaprine 5 milliGRAM(s) Oral three times a day  folic acid 1 milliGRAM(s) Oral daily  gabapentin Solution 300 milliGRAM(s) Oral at bedtime  heparin   Injectable 5000 Unit(s) SubCutaneous every 8 hours  lactated ringers. 500 milliLiter(s) (1000 mL/Hr) IV Continuous <Continuous>  lactated ringers. 500 milliLiter(s) (500 mL/Hr) IV Continuous <Continuous>  lactulose Syrup 20 Gram(s) Oral daily  multivitamin 1 Tablet(s) Oral daily  mupirocin 2% Ointment 1 Application(s) Both Nostrils two times a day  pancrelipase  (CREON  6,000 Lipase Units) 2 Capsule(s) Oral three times a day with meals  pantoprazole   Suspension 40 milliGRAM(s) Oral two times a day  polyethylene glycol 3350 17 Gram(s) Oral two times a day  potassium chloride  10 mEq/100 mL IVPB 10 milliEquivalent(s) IV Intermittent every 1 hour  senna 2 Tablet(s) Oral at bedtime  sucralfate 1 Gram(s) Oral every 6 hours  tamsulosin 0.4 milliGRAM(s) Oral at bedtime  thiamine 100 milliGRAM(s) Oral daily    MEDICATIONS  (PRN):  chlorproMAZINE    Tablet 10 milliGRAM(s) Oral every 8 hours PRN hiccuping  HYDROmorphone  Injectable 0.5 milliGRAM(s) IV Push every 4 hours PRN Severe Pain (7 - 10)  melatonin 3 milliGRAM(s) Oral at bedtime PRN Insomnia  oxyCODONE    Solution 10 milliGRAM(s) Oral every 4 hours PRN Severe Pain (7 - 10)      ALLERGIES:  Allergies    No Known Allergies    Intolerances        OBJECTIVE:  ICU Vital Signs Last 24 Hrs  T(C): 37.6 (21 Apr 2022 06:45), Max: 37.6 (21 Apr 2022 06:45)  T(F): 99.7 (21 Apr 2022 06:45), Max: 99.7 (21 Apr 2022 06:45)  HR: 88 (21 Apr 2022 06:45) (76 - 89)  BP: 122/57 (21 Apr 2022 06:45) (122/57 - 161/76)  BP(mean): --  ABP: --  ABP(mean): --  RR: 18 (21 Apr 2022 06:45) (16 - 18)  SpO2: 98% (21 Apr 2022 06:45) (96% - 99%)            I&O's Summary    20 Apr 2022 07:01  -  21 Apr 2022 07:00  --------------------------------------------------------  IN: 1750 mL / OUT: 850 mL / NET: 900 mL        PHYSICAL EXAMINATION:  GENERAL: Frail thin man lying in bed  HEAD:  Atraumatic, Normocephalic  EYES: EOMI, PERRLA, conjunctiva and sclera clear  ENT: Moist mucous membranes  NECK: Supple, No JVD  CHEST/LUNG: Clear to auscultation bilaterally; No rales, rhonchi, wheezing, or rubs. Unlabored respirations  HEART: Regular rate and rhythm; No murmurs, rubs, or gallops  ABDOMEN: BSx4; Soft, tender to palpation in epigastrium, no guarding, hiccuping  EXTREMITIES:  1+ Peripheral Pulses, brisk capillary refill. long toenails  NERVOUS SYSTEM:  A&Ox3, no focal deficits   SKIN: No rashes or lesions      LABS:                          10.6   11.87 )-----------( 326      ( 20 Apr 2022 05:46 )             33.8     04-20    143  |  112<H>  |  25<H>  ----------------------------<  115<H>  3.3<L>   |  17<L>  |  2.72<H>    Ca    8.7      20 Apr 2022 05:46  Phos  2.3     04-20  Mg     1.90     04-20    TPro  6.2  /  Alb  3.1<L>  /  TBili  <0.2  /  DBili  x   /  AST  9   /  ALT  16  /  AlkPhos  84  04-20    LIVER FUNCTIONS - ( 20 Apr 2022 05:46 )  Alb: 3.1 g/dL / Pro: 6.2 g/dL / ALK PHOS: 84 U/L / ALT: 16 U/L / AST: 9 U/L / GGT: x                       TELEMETRY:     EKG:     IMAGING:  < from: MR Abdomen w/wo IV Cont (04.20.22 @ 14:54) >  PROCEDURE:  MRI of the abdomen was performed.  MRCP was performed.    FINDINGS:  Technically difficult study due to respiratory motion.    LOWER CHEST: Distal esophageal wall thickening with esophageal stent.    LIVER: Within normal limits.  BILE DUCTS: Normal caliber.  GALLBLADDER: Within normal limits.  SPLEEN: Within normal limits.  PANCREAS: Severe main ductal dilatation and irregularity measuring up to   1.7 cm in the body with innumerable dilated side branches, nearly   replacing the pancreatic parenchyma. Diffuse parenchymal calcifications   better appreciated on prior CT. Change in caliber of the main pancreatic   duct upstream of a T2 hypointense filling defect at the pancreatic neck   body junction (5, 21), suspect intraductal calcification when compared to   CT 3/1/2022. Additional intraductal filling defects in the pancreatic   head and neck (5, 21 and 5, 23) also appear to correspond to suspected   intraductal calcifications. No obvious mass is identified.  ADRENALS: Within normal limits.  KIDNEYS/URETERS: Left renal cyst.    VISUALIZED PORTIONS:  BOWEL: Large colonic stool.  PERITONEUM: No ascites.  VESSELS: Within normal limits.  RETROPERITONEUM/LYMPH NODES: No lymphadenopathy.  ABDOMINAL WALL: Within normal limits.  BONES: Within normal limits.    IMPRESSION:    Severe dilation of the main pancreatic duct with innumerable dilated   sidebranches, with caliber change at the neck/body junction. Favor   sequela of chronic pancreatitis with obstructing intraductal calculi. No   obvious enhancing mass on MR within the limitations of a markedly motion   degraded exam.    Distal esophageal wall thickening with esophageal stent. Correlate with   endoscopy.    --- End of Report ---    < end of copied text >       Authored by Neris Galaviz MD, PGY2  PATIENT:  IMELDA ROBERTSON  [id number]    CHIEF COMPLAINT:  Patient is a 61y old  Male who presents with a chief complaint of Esophageal stricture (20 Apr 2022 13:20)      INTERVAL HISTORY OVERNIGHT EVENTS: YOSSI overnight. Patient more fatigued this AM, received standing flexeril last at 7am, increased gabapentin dose last night. Patient states "I dont know," when asked if pain improved. States he is tolerating PO intake.           MEDICATIONS:  MEDICATIONS  (STANDING):  acetaminophen     Tablet .. 650 milliGRAM(s) Oral every 6 hours  bisacodyl Suppository 10 milliGRAM(s) Rectal daily  cyclobenzaprine 5 milliGRAM(s) Oral three times a day  folic acid 1 milliGRAM(s) Oral daily  gabapentin Solution 300 milliGRAM(s) Oral at bedtime  heparin   Injectable 5000 Unit(s) SubCutaneous every 8 hours  lactated ringers. 500 milliLiter(s) (1000 mL/Hr) IV Continuous <Continuous>  lactated ringers. 500 milliLiter(s) (500 mL/Hr) IV Continuous <Continuous>  lactulose Syrup 20 Gram(s) Oral daily  multivitamin 1 Tablet(s) Oral daily  mupirocin 2% Ointment 1 Application(s) Both Nostrils two times a day  pancrelipase  (CREON  6,000 Lipase Units) 2 Capsule(s) Oral three times a day with meals  pantoprazole   Suspension 40 milliGRAM(s) Oral two times a day  polyethylene glycol 3350 17 Gram(s) Oral two times a day  potassium chloride  10 mEq/100 mL IVPB 10 milliEquivalent(s) IV Intermittent every 1 hour  senna 2 Tablet(s) Oral at bedtime  sucralfate 1 Gram(s) Oral every 6 hours  tamsulosin 0.4 milliGRAM(s) Oral at bedtime  thiamine 100 milliGRAM(s) Oral daily    MEDICATIONS  (PRN):  chlorproMAZINE    Tablet 10 milliGRAM(s) Oral every 8 hours PRN hiccuping  HYDROmorphone  Injectable 0.5 milliGRAM(s) IV Push every 4 hours PRN Severe Pain (7 - 10)  melatonin 3 milliGRAM(s) Oral at bedtime PRN Insomnia  oxyCODONE    Solution 10 milliGRAM(s) Oral every 4 hours PRN Severe Pain (7 - 10)      ALLERGIES:  Allergies    No Known Allergies    Intolerances        OBJECTIVE:  ICU Vital Signs Last 24 Hrs  T(C): 37.6 (21 Apr 2022 06:45), Max: 37.6 (21 Apr 2022 06:45)  T(F): 99.7 (21 Apr 2022 06:45), Max: 99.7 (21 Apr 2022 06:45)  HR: 88 (21 Apr 2022 06:45) (76 - 89)  BP: 122/57 (21 Apr 2022 06:45) (122/57 - 161/76)  BP(mean): --  ABP: --  ABP(mean): --  RR: 18 (21 Apr 2022 06:45) (16 - 18)  SpO2: 98% (21 Apr 2022 06:45) (96% - 99%)            I&O's Summary    20 Apr 2022 07:01  -  21 Apr 2022 07:00  --------------------------------------------------------  IN: 1750 mL / OUT: 850 mL / NET: 900 mL        PHYSICAL EXAMINATION:  GENERAL: Frail thin man lying in bed  HEAD:  Atraumatic, Normocephalic  EYES: EOMI, PERRLA, conjunctiva and sclera clear  ENT: Moist mucous membranes  NECK: Supple, No JVD  CHEST/LUNG: Clear to auscultation bilaterally; No rales, rhonchi, wheezing, or rubs. Unlabored respirations  HEART: Regular rate and rhythm; No murmurs, rubs, or gallops  ABDOMEN: BSx4; Soft, tender to palpation in epigastrium, no guarding, hiccuping  EXTREMITIES:  1+ Peripheral Pulses, brisk capillary refill. long toenails  NERVOUS SYSTEM:  A&Ox3, no focal deficits   SKIN: No rashes or lesions      LABS:                          10.6   11.87 )-----------( 326      ( 20 Apr 2022 05:46 )             33.8     04-20    143  |  112<H>  |  25<H>  ----------------------------<  115<H>  3.3<L>   |  17<L>  |  2.72<H>    Ca    8.7      20 Apr 2022 05:46  Phos  2.3     04-20  Mg     1.90     04-20    TPro  6.2  /  Alb  3.1<L>  /  TBili  <0.2  /  DBili  x   /  AST  9   /  ALT  16  /  AlkPhos  84  04-20    LIVER FUNCTIONS - ( 20 Apr 2022 05:46 )  Alb: 3.1 g/dL / Pro: 6.2 g/dL / ALK PHOS: 84 U/L / ALT: 16 U/L / AST: 9 U/L / GGT: x                       TELEMETRY:     EKG:     IMAGING:  < from: MR Abdomen w/wo IV Cont (04.20.22 @ 14:54) >  PROCEDURE:  MRI of the abdomen was performed.  MRCP was performed.    FINDINGS:  Technically difficult study due to respiratory motion.    LOWER CHEST: Distal esophageal wall thickening with esophageal stent.    LIVER: Within normal limits.  BILE DUCTS: Normal caliber.  GALLBLADDER: Within normal limits.  SPLEEN: Within normal limits.  PANCREAS: Severe main ductal dilatation and irregularity measuring up to   1.7 cm in the body with innumerable dilated side branches, nearly   replacing the pancreatic parenchyma. Diffuse parenchymal calcifications   better appreciated on prior CT. Change in caliber of the main pancreatic   duct upstream of a T2 hypointense filling defect at the pancreatic neck   body junction (5, 21), suspect intraductal calcification when compared to   CT 3/1/2022. Additional intraductal filling defects in the pancreatic   head and neck (5, 21 and 5, 23) also appear to correspond to suspected   intraductal calcifications. No obvious mass is identified.  ADRENALS: Within normal limits.  KIDNEYS/URETERS: Left renal cyst.    VISUALIZED PORTIONS:  BOWEL: Large colonic stool.  PERITONEUM: No ascites.  VESSELS: Within normal limits.  RETROPERITONEUM/LYMPH NODES: No lymphadenopathy.  ABDOMINAL WALL: Within normal limits.  BONES: Within normal limits.    IMPRESSION:    Severe dilation of the main pancreatic duct with innumerable dilated   sidebranches, with caliber change at the neck/body junction. Favor   sequela of chronic pancreatitis with obstructing intraductal calculi. No   obvious enhancing mass on MR within the limitations of a markedly motion   degraded exam.    Distal esophageal wall thickening with esophageal stent. Correlate with   endoscopy.    --- End of Report ---    < end of copied text >

## 2022-04-21 NOTE — PROGRESS NOTE ADULT - ATTENDING COMMENTS
Patient seen and examined, care plan discussed with house staff as above.    61yoM w hx of chronic pancreatitis (2/2 ?remote etoh use), CKD Stage IV, HCV (s/p 8 weeks treatment, unclear when), hx of gastric perf w peritonitis and MSSA bacteremia in 2019 requiring ex lap and prolonged hospitalization, 100 lb weight loss unintentionally, transferred from  with esophageal stricture w intractable hiccups and epigastric pain (persists); in need of advanced GI specialist as scope could not be advanced in prior EGD at  4/6. Pt denies dysphagia, described ongoing pain of esophagus.     Febrile on admission to tmax of 102F s/p 1 dose of zosyn. Infectious work up neg-- CXR without infiltrate, though patient is high risk for aspiration event, blood cultures, urine cultures NGTD, HIV neg 4/6, no evidence of necrotizing pancreatitis on CT (continues to show pancreatic ductal dilation).  Note lytic lesion of iliac crest L side on CT ap 4/6, stable from prior imaging.  Prior EGD with biopsy Jan 2022 showed gastritis/esophagitis, biopsy obtained at  4/6 w path concerning for pill esophagitis, H pylori neg on initial EGD bx in Dec 2021. CEA 4.5 in 2019. F/u CA-19-9 (high 152) and CEA (nml 3.7) this admission. Baseline EKG 4/14 nonischemic. Initially, high concern for malignancy, however w resulted MR abd (limited by motion artifact), this is now lower on the differential.    Esophageal stricture w esophagitis- Cont PPI BID, transitioned to solution 4/19. GI w EGD Tues 4/19, esophageal stent placed, bx sent (results pending); needs f/u EGD in 3-4 weeks for removal of stent. Ongoing pain related to esophagitis--> transition all pill meds to solution. Cont carafate, PPI. Pt declines dilaudid, wants morphine (would not give iso CKD IV). Solution oxycodone is improving pain but still not resolved, dilaudid for breakthrough pain. MRI chest/abd/pelv ordered, pending results (malignancy work up). Pt consented. Chest pain ongoing issue even post stent placement. Repeat EKG nonischemic 4/20, CXR, abd XR to r/o free air in abd on 4/20.     Pancreatic ductal dilation, severe- noted on MR abd 4/20, also present on prior CT imaging.  MR w/o evidence of pancreatic mass however study limited by motion artifact. Based on my read and radiology report, unclear if he has an obstructive stone or not. D/w advanced GI team.    Leukocytosis, worsening 4/21- High risk for aspiration event, CXR without infiltrate on 4/20. Blood cultures 4/21. No evidence of diverticulitis or necrotizing pancreatitis on MR abd 4/20. Bladder scan 4/20 w PVR, if positive, will get UA/culture. Gallstone pancreatitis ?    Constipation- treat aggressively w BID miralax/senna and daily enema until BM. (Pt has been refusing enema).    Hypernatremia, resolved- Suspect 2/2 decr oral itnake (NPO pre procedure 4/19). Trial of IVF, improved. Patient seen and examined, care plan discussed with house staff as above.    61yoM w hx of chronic pancreatitis (2/2 ?remote etoh use), CKD Stage IV, HCV (s/p 8 weeks treatment, unclear when), hx of gastric perf w peritonitis and MSSA bacteremia in 2019 requiring ex lap and prolonged hospitalization, 100 lb weight loss unintentionally, transferred from  with esophageal stricture w intractable hiccups and epigastric pain (persists); in need of advanced GI specialist as scope could not be advanced in prior EGD at  4/6. Pt denies dysphagia, described ongoing pain of esophagus.     Febrile on admission to tmax of 102F s/p 1 dose of zosyn. Infectious work up neg-- CXR without infiltrate, though patient is high risk for aspiration event, blood cultures, urine cultures NGTD, HIV neg 4/6, no evidence of necrotizing pancreatitis on CT (continues to show pancreatic ductal dilation).  Note lytic lesion of iliac crest L side on CT ap 4/6, stable from prior imaging.  Prior EGD with biopsy Jan 2022 showed gastritis/esophagitis, biopsy obtained at  4/6 w path concerning for pill esophagitis, H pylori neg on initial EGD bx in Dec 2021. CEA 4.5 in 2019. F/u CA-19-9 (high 152) and CEA (nml 3.7) this admission. Baseline EKG 4/14 nonischemic. Initially, high concern for malignancy, however w resulted MR abd (limited by motion artifact), this is now lower on the differential.    Esophageal stricture w esophagitis- Cont PPI BID, transitioned to solution 4/19. GI w EGD Tues 4/19, esophageal stent placed, bx sent (results pending); needs f/u EGD in 3-4 weeks for removal of stent. Ongoing pain related to esophagitis--> transition all pill meds to solution. Cont carafate, PPI. Pt declines dilaudid, wants morphine (would not give iso CKD IV). Solution oxycodone is improving pain but still not resolved, dilaudid for breakthrough pain. MRI chest/abd/pelv ordered, pending results (malignancy work up). Pt consented. Chest pain ongoing issue even post stent placement. Repeat EKG nonischemic 4/20, CXR, abd XR to r/o free air in abd on 4/20.     Pancreatic ductal dilation, severe- noted on MR abd 4/20, also present on prior CT imaging.  MR w/o evidence of pancreatic mass however study limited by motion artifact. Based on my read and radiology report, unclear if he has an obstructive stone or not. Consider autoimmune pancreatitis work up. D/w advanced GI team.    Leukocytosis, worsening 4/21- High risk for aspiration event, CXR without infiltrate on 4/20. Blood cultures 4/21. No evidence of diverticulitis or necrotizing pancreatitis on MR abd 4/20. Bladder scan 4/20 w PVR, if positive, will get UA/culture. Gallstone pancreatitis ?    Constipation- treat aggressively w BID miralax/senna and daily enema until BM. (Pt has been refusing enema).    Hypernatremia, resolved- Suspect 2/2 decr oral itnake (NPO pre procedure 4/19). Trial of IVF, improved.

## 2022-04-21 NOTE — PROGRESS NOTE ADULT - PROBLEM SELECTOR PLAN 5
Patient with large stool burden, unable to pass stool without manual extraction  -c/w senna, Miralax, dulcolax suppository, enemas prn, will add lactulose by mouth  -ordered GoLytely yesterday per GI recs  -Linzess in setting of opioid use?  -consider malignant process, GI consult for role of colonoscopy, state no role for colonoscopy at this time

## 2022-04-21 NOTE — CHART NOTE - NSCHARTNOTEFT_GEN_A_CORE
MRI reviewed. Consistent w/ chronic pancreatitis w/ pancreatic duct stones.   Patient may benefit from either PD stone extraction or PD stenting.  However, cannot pass ERCP scope through esophagus at this time given severity of stricture.     Recommend pain control per primary team.  Outpatient f/u with Dr. Hinds in 3-4 weeks for repeat EGD to remove stent. Patient can call [phone] to schedule.   At that time can discuss possibility of future ERCP w/ PD stone removal or PD stenting.

## 2022-04-21 NOTE — PROGRESS NOTE ADULT - PROBLEM SELECTOR PLAN 4
pt reports 10/10 chest pain - now more likely 2/2 chronic pancreatitis given no improvement in pain s/p esophogeal stent. Also playing a role is chronic esophagitis.   -low suspicion for cardiac etiology - EKG: without ischemic changes, troponin negative   - pain management rec oxy 10mg q4h prn, tylenol 650mg ATC x2 days, gabapentin renally dosed, and flexeril standing for now  -c/w thorazine  -appreciate pain management recs pt reports 10/10 chest pain - now more likely 2/2 chronic pancreatitis given no improvement in pain s/p esophogeal stent. Also playing a role is chronic esophagitis.   -low suspicion for cardiac etiology - EKG: without ischemic changes, troponin negative   - pain management rec oxy 10mg q4h prn, tylenol 650mg ATC x2 days, gabapentin renally dosed, and flexeril standing for now; will change to prn as patient more lethargic this AM. May also decrease gabapentin to 200mg.   -c/w thorazine  -appreciate pain management recs

## 2022-04-21 NOTE — PROGRESS NOTE ADULT - PROBLEM SELECTOR PLAN 1
Patient found to have esophageal stricture 35cm from incisure, unable to pass EGD scope on 4/8. Transferred to [facility] for advanced GI eval.  -per advanced GI, f/u infectious workup prior to intervention, now s/p EGD with stent yesterday 4/19, will increase to pureed diet today  -Aspiration precautions, head of bed elevation  -per EGD report, restriction characterized as benign, biopsy obtained; will proceed with MRI abdomen with IV contrast to r/o any other underling malignancy; stricture may be 2/2 chronic esophogitis. f/u biopsy.   -MR abdomen with esophogeal thickening, no obvious mass  -c/w PPI PO for total of 8 weeks on BID dosing, then daily  -will reconsult pain management for recommendations given persistent pain, no improvement on oxycodone  -patient with worsened epigastric pain today, new leukocytosis, f/u CXR/AXR to r/o perforation, continue to monitor exam

## 2022-04-21 NOTE — PROGRESS NOTE ADULT - PROBLEM SELECTOR PLAN 9
DVT ppx: Started heparin subQ in case of underlying malignancy, SCDs if GI planning procedure   Diet: pureed, increase as tolerated   Dispo: pending clinical course

## 2022-04-21 NOTE — PROGRESS NOTE ADULT - PROBLEM SELECTOR PLAN 3
Patient with fever on admission to 39.3 with tachycardia. Unclear infectious source, no leukocytosis. S/p 1 dose of zosyn. Consider tumor fever, low suspicion now for infection.   -blood cxs: NGTD  -UA/urine culture negative  -AP CXR without focal consolidation  -Monitor off further antibiotics unless fever recurs  -4/20: with leukocytosis, likely reactive s/p procedure Patient with fever on admission to 39.3 with tachycardia. Unclear infectious source, no leukocytosis. S/p 1 dose of zosyn. Consider tumor fever, low suspicion now for infection.   -blood cxs: NGTD  -UA/urine culture negative  -AP CXR without focal consolidation  -Monitor off further antibiotics unless fever recurs  -4/20: with leukocytosis, likely reactive s/p procedure, increasing today with borderline high normal temperatue 4/21,  f/u rectal temp, if febrile, will culture, start empiric zosyn for possible GI infection. CXR and AXR without free air.

## 2022-04-21 NOTE — PROGRESS NOTE ADULT - ASSESSMENT
60 yo M with hx of remote ETOH use, chronic pancreatitis, esophagitis, HCV s/p treatment, CKD4, initially presented to [facility] with abdominal pain found to have esophageal stricture on EGD transferred to [facility] for advanced GI evaluation.

## 2022-04-21 NOTE — PROGRESS NOTE ADULT - PROBLEM SELECTOR PLAN 2
Hx of pancreatitis with CT evidence of chronic pancreatitis.  -C/w CREON  -patient's MRI notable for severe dilation of the main pancreatic duct with innumerable dilated sidebranches, with caliber change at the neck/body junction. Favor sequela of chronic pancreatitis with obstructing intraductal calculi.  -advanced GI aware, will f/u recommendations on role of ERCP

## 2022-04-21 NOTE — PROGRESS NOTE ADULT - PROBLEM SELECTOR PLAN 6
Patient initially presented with YUNEIL on CKD. Improved with IVF. Now at serum Cr baseline.  -Strict I/Os  -Avoid nephrotoxins  -Dose meds per eGFR

## 2022-04-22 ENCOUNTER — TRANSCRIPTION ENCOUNTER (OUTPATIENT)
Age: 62
End: 2022-04-22

## 2022-04-22 LAB
ALBUMIN SERPL ELPH-MCNC: 2.8 G/DL — LOW (ref 3.3–5)
ALBUMIN SERPL ELPH-MCNC: 3 G/DL — LOW (ref 3.3–5)
ALP SERPL-CCNC: 82 U/L — SIGNIFICANT CHANGE UP (ref 40–120)
ALP SERPL-CCNC: 90 U/L — SIGNIFICANT CHANGE UP (ref 40–120)
ALT FLD-CCNC: 6 U/L — SIGNIFICANT CHANGE UP (ref 4–41)
ALT FLD-CCNC: 9 U/L — SIGNIFICANT CHANGE UP (ref 4–41)
ANION GAP SERPL CALC-SCNC: 11 MMOL/L — SIGNIFICANT CHANGE UP (ref 7–14)
ANION GAP SERPL CALC-SCNC: 12 MMOL/L — SIGNIFICANT CHANGE UP (ref 7–14)
AST SERPL-CCNC: 10 U/L — SIGNIFICANT CHANGE UP (ref 4–40)
AST SERPL-CCNC: 10 U/L — SIGNIFICANT CHANGE UP (ref 4–40)
BILIRUB SERPL-MCNC: <0.2 MG/DL — SIGNIFICANT CHANGE UP (ref 0.2–1.2)
BILIRUB SERPL-MCNC: <0.2 MG/DL — SIGNIFICANT CHANGE UP (ref 0.2–1.2)
BUN SERPL-MCNC: 25 MG/DL — HIGH (ref 7–23)
BUN SERPL-MCNC: 25 MG/DL — HIGH (ref 7–23)
CALCIUM SERPL-MCNC: 8.6 MG/DL — SIGNIFICANT CHANGE UP (ref 8.4–10.5)
CALCIUM SERPL-MCNC: 8.7 MG/DL — SIGNIFICANT CHANGE UP (ref 8.4–10.5)
CHLORIDE SERPL-SCNC: 108 MMOL/L — HIGH (ref 98–107)
CHLORIDE SERPL-SCNC: 109 MMOL/L — HIGH (ref 98–107)
CO2 SERPL-SCNC: 18 MMOL/L — LOW (ref 22–31)
CO2 SERPL-SCNC: 21 MMOL/L — LOW (ref 22–31)
CREAT SERPL-MCNC: 2.58 MG/DL — HIGH (ref 0.5–1.3)
CREAT SERPL-MCNC: 2.6 MG/DL — HIGH (ref 0.5–1.3)
EGFR: 27 ML/MIN/1.73M2 — LOW
EGFR: 27 ML/MIN/1.73M2 — LOW
GLUCOSE SERPL-MCNC: 90 MG/DL — SIGNIFICANT CHANGE UP (ref 70–99)
GLUCOSE SERPL-MCNC: 91 MG/DL — SIGNIFICANT CHANGE UP (ref 70–99)
HCT VFR BLD CALC: 31 % — LOW (ref 39–50)
HGB BLD-MCNC: 9.8 G/DL — LOW (ref 13–17)
MAGNESIUM SERPL-MCNC: 1.9 MG/DL — SIGNIFICANT CHANGE UP (ref 1.6–2.6)
MAGNESIUM SERPL-MCNC: 2 MG/DL — SIGNIFICANT CHANGE UP (ref 1.6–2.6)
MCHC RBC-ENTMCNC: 28.9 PG — SIGNIFICANT CHANGE UP (ref 27–34)
MCHC RBC-ENTMCNC: 31.6 GM/DL — LOW (ref 32–36)
MCV RBC AUTO: 91.4 FL — SIGNIFICANT CHANGE UP (ref 80–100)
NRBC # BLD: 0 /100 WBCS — SIGNIFICANT CHANGE UP
NRBC # FLD: 0 K/UL — SIGNIFICANT CHANGE UP
PHOSPHATE SERPL-MCNC: 2.5 MG/DL — SIGNIFICANT CHANGE UP (ref 2.5–4.5)
PHOSPHATE SERPL-MCNC: 2.6 MG/DL — SIGNIFICANT CHANGE UP (ref 2.5–4.5)
PLATELET # BLD AUTO: 335 K/UL — SIGNIFICANT CHANGE UP (ref 150–400)
POTASSIUM SERPL-MCNC: 3.7 MMOL/L — SIGNIFICANT CHANGE UP (ref 3.5–5.3)
POTASSIUM SERPL-MCNC: 4.1 MMOL/L — SIGNIFICANT CHANGE UP (ref 3.5–5.3)
POTASSIUM SERPL-SCNC: 3.7 MMOL/L — SIGNIFICANT CHANGE UP (ref 3.5–5.3)
POTASSIUM SERPL-SCNC: 4.1 MMOL/L — SIGNIFICANT CHANGE UP (ref 3.5–5.3)
PROT SERPL-MCNC: 5.9 G/DL — LOW (ref 6–8.3)
PROT SERPL-MCNC: 6.2 G/DL — SIGNIFICANT CHANGE UP (ref 6–8.3)
RBC # BLD: 3.39 M/UL — LOW (ref 4.2–5.8)
RBC # FLD: 17 % — HIGH (ref 10.3–14.5)
SODIUM SERPL-SCNC: 138 MMOL/L — SIGNIFICANT CHANGE UP (ref 135–145)
SODIUM SERPL-SCNC: 141 MMOL/L — SIGNIFICANT CHANGE UP (ref 135–145)
WBC # BLD: 9.55 K/UL — SIGNIFICANT CHANGE UP (ref 3.8–10.5)
WBC # FLD AUTO: 9.55 K/UL — SIGNIFICANT CHANGE UP (ref 3.8–10.5)

## 2022-04-22 PROCEDURE — 99232 SBSQ HOSP IP/OBS MODERATE 35: CPT

## 2022-04-22 RX ORDER — OXYCODONE HYDROCHLORIDE 5 MG/1
10 TABLET ORAL EVERY 4 HOURS
Refills: 0 | Status: DISCONTINUED | OUTPATIENT
Start: 2022-04-22 | End: 2022-04-23

## 2022-04-22 RX ADMIN — GABAPENTIN 200 MILLIGRAM(S): 400 CAPSULE ORAL at 22:11

## 2022-04-22 RX ADMIN — Medication 2 CAPSULE(S): at 18:26

## 2022-04-22 RX ADMIN — Medication 1 GRAM(S): at 18:26

## 2022-04-22 RX ADMIN — Medication 2 CAPSULE(S): at 11:25

## 2022-04-22 RX ADMIN — PANTOPRAZOLE SODIUM 40 MILLIGRAM(S): 20 TABLET, DELAYED RELEASE ORAL at 06:17

## 2022-04-22 RX ADMIN — Medication 650 MILLIGRAM(S): at 12:00

## 2022-04-22 RX ADMIN — Medication 1 GRAM(S): at 06:15

## 2022-04-22 RX ADMIN — MUPIROCIN 1 APPLICATION(S): 20 OINTMENT TOPICAL at 18:26

## 2022-04-22 RX ADMIN — Medication 1 MILLIGRAM(S): at 11:24

## 2022-04-22 RX ADMIN — OXYCODONE HYDROCHLORIDE 10 MILLIGRAM(S): 5 TABLET ORAL at 14:45

## 2022-04-22 RX ADMIN — Medication 10 MILLIGRAM(S): at 00:02

## 2022-04-22 RX ADMIN — OXYCODONE HYDROCHLORIDE 10 MILLIGRAM(S): 5 TABLET ORAL at 09:45

## 2022-04-22 RX ADMIN — Medication 650 MILLIGRAM(S): at 06:55

## 2022-04-22 RX ADMIN — OXYCODONE HYDROCHLORIDE 10 MILLIGRAM(S): 5 TABLET ORAL at 01:07

## 2022-04-22 RX ADMIN — OXYCODONE HYDROCHLORIDE 10 MILLIGRAM(S): 5 TABLET ORAL at 04:16

## 2022-04-22 RX ADMIN — HEPARIN SODIUM 5000 UNIT(S): 5000 INJECTION INTRAVENOUS; SUBCUTANEOUS at 06:16

## 2022-04-22 RX ADMIN — SENNA PLUS 2 TABLET(S): 8.6 TABLET ORAL at 22:12

## 2022-04-22 RX ADMIN — Medication 10 MILLIGRAM(S): at 22:13

## 2022-04-22 RX ADMIN — HEPARIN SODIUM 5000 UNIT(S): 5000 INJECTION INTRAVENOUS; SUBCUTANEOUS at 13:50

## 2022-04-22 RX ADMIN — OXYCODONE HYDROCHLORIDE 10 MILLIGRAM(S): 5 TABLET ORAL at 14:13

## 2022-04-22 RX ADMIN — Medication 2 CAPSULE(S): at 09:07

## 2022-04-22 RX ADMIN — OXYCODONE HYDROCHLORIDE 10 MILLIGRAM(S): 5 TABLET ORAL at 23:13

## 2022-04-22 RX ADMIN — Medication 1 GRAM(S): at 11:24

## 2022-04-22 RX ADMIN — Medication 1 PACKET(S): at 06:24

## 2022-04-22 RX ADMIN — Medication 1 TABLET(S): at 11:24

## 2022-04-22 RX ADMIN — LACTULOSE 20 GRAM(S): 10 SOLUTION ORAL at 11:24

## 2022-04-22 RX ADMIN — POLYETHYLENE GLYCOL 3350 17 GRAM(S): 17 POWDER, FOR SOLUTION ORAL at 06:16

## 2022-04-22 RX ADMIN — HEPARIN SODIUM 5000 UNIT(S): 5000 INJECTION INTRAVENOUS; SUBCUTANEOUS at 22:10

## 2022-04-22 RX ADMIN — OXYCODONE HYDROCHLORIDE 10 MILLIGRAM(S): 5 TABLET ORAL at 22:13

## 2022-04-22 RX ADMIN — OXYCODONE HYDROCHLORIDE 10 MILLIGRAM(S): 5 TABLET ORAL at 09:12

## 2022-04-22 RX ADMIN — OXYCODONE HYDROCHLORIDE 10 MILLIGRAM(S): 5 TABLET ORAL at 18:25

## 2022-04-22 RX ADMIN — Medication 650 MILLIGRAM(S): at 11:24

## 2022-04-22 RX ADMIN — TAMSULOSIN HYDROCHLORIDE 0.4 MILLIGRAM(S): 0.4 CAPSULE ORAL at 22:12

## 2022-04-22 RX ADMIN — MUPIROCIN 1 APPLICATION(S): 20 OINTMENT TOPICAL at 06:17

## 2022-04-22 RX ADMIN — Medication 650 MILLIGRAM(S): at 06:16

## 2022-04-22 RX ADMIN — OXYCODONE HYDROCHLORIDE 10 MILLIGRAM(S): 5 TABLET ORAL at 05:16

## 2022-04-22 RX ADMIN — Medication 100 MILLIGRAM(S): at 11:24

## 2022-04-22 RX ADMIN — OXYCODONE HYDROCHLORIDE 10 MILLIGRAM(S): 5 TABLET ORAL at 18:55

## 2022-04-22 RX ADMIN — OXYCODONE HYDROCHLORIDE 10 MILLIGRAM(S): 5 TABLET ORAL at 00:07

## 2022-04-22 RX ADMIN — PANTOPRAZOLE SODIUM 40 MILLIGRAM(S): 20 TABLET, DELAYED RELEASE ORAL at 18:26

## 2022-04-22 NOTE — DISCHARGE NOTE PROVIDER - NSDCHHPTASSISTHOME_GEN_ALL_CORE
Recent Results (from the past 24 hour(s))   Comprehensive Metabolic Panel   Result Value Ref Range    Sodium 139 136 - 145 mmol/L    Potassium 4.2 3.5 - 5.0 mmol/L    Chloride 104 98 - 107 mmol/L    CO2 26 22 - 31 mmol/L    Anion Gap, Calculation 9 5 - 18 mmol/L    Glucose 353 (H) 70 - 125 mg/dL    BUN 8 8 - 22 mg/dL    Creatinine 0.75 0.70 - 1.30 mg/dL    GFR MDRD Af Amer >60 >60 mL/min/1.73m2    GFR MDRD Non Af Amer >60 >60 mL/min/1.73m2    Bilirubin, Total 0.2 0.0 - 1.0 mg/dL    Calcium 9.7 8.5 - 10.5 mg/dL    Protein, Total 6.6 6.0 - 8.0 g/dL    Albumin 3.8 3.5 - 5.0 g/dL    Alkaline Phosphatase 278 (H) 45 - 120 U/L    AST 44 (H) 0 - 40 U/L    ALT 84 (H) 0 - 45 U/L   HM1 (CBC with Diff)   Result Value Ref Range    WBC 5.8 4.0 - 11.0 thou/uL    RBC 3.14 (L) 4.40 - 6.20 mill/uL    Hemoglobin 8.3 (L) 14.0 - 18.0 g/dL    Hematocrit 27.0 (L) 40.0 - 54.0 %    MCV 86 80 - 100 fL    MCH 26.4 (L) 27.0 - 34.0 pg    MCHC 30.7 (L) 32.0 - 36.0 g/dL    RDW 16.6 (H) 11.0 - 14.5 %    Platelets 285 140 - 440 thou/uL    MPV 9.9 8.5 - 12.5 fL       
Patient Needs Assistance to Leave Residence...

## 2022-04-22 NOTE — PROGRESS NOTE ADULT - SUBJECTIVE AND OBJECTIVE BOX
Authored by Neris Galaviz MD, PGY2  PATIENT:  IMELDA ROBERTSON  [id number]    CHIEF COMPLAINT:  Patient is a 61y old  Male who presents with a chief complaint of Esophageal stricture (2022 14:20)      INTERVAL HISTORY OVERNIGHT EVENTS: YOSSI overnight.         MEDICATIONS:  MEDICATIONS  (STANDING):  acetaminophen     Tablet .. 650 milliGRAM(s) Oral every 6 hours  bisacodyl Suppository 10 milliGRAM(s) Rectal daily  folic acid 1 milliGRAM(s) Oral daily  gabapentin Solution 200 milliGRAM(s) Oral at bedtime  heparin   Injectable 5000 Unit(s) SubCutaneous every 8 hours  lactulose Syrup 20 Gram(s) Oral daily  multivitamin 1 Tablet(s) Oral daily  mupirocin 2% Ointment 1 Application(s) Both Nostrils two times a day  pancrelipase  (CREON  6,000 Lipase Units) 2 Capsule(s) Oral three times a day with meals  pantoprazole   Suspension 40 milliGRAM(s) Oral two times a day  polyethylene glycol 3350 17 Gram(s) Oral two times a day  senna 2 Tablet(s) Oral at bedtime  sucralfate 1 Gram(s) Oral every 6 hours  tamsulosin 0.4 milliGRAM(s) Oral at bedtime  thiamine 100 milliGRAM(s) Oral daily    MEDICATIONS  (PRN):  chlorproMAZINE    Tablet 10 milliGRAM(s) Oral every 8 hours PRN hiccuping  cyclobenzaprine 5 milliGRAM(s) Oral three times a day PRN hiccuping  HYDROmorphone  Injectable 0.5 milliGRAM(s) IV Push every 4 hours PRN Severe Pain (7 - 10)  melatonin 3 milliGRAM(s) Oral at bedtime PRN Insomnia  oxyCODONE    Solution 10 milliGRAM(s) Oral every 4 hours PRN Severe Pain (7 - 10)      ALLERGIES:  Allergies    No Known Allergies    Intolerances        OBJECTIVE:  ICU Vital Signs Last 24 Hrs  T(C): 36.8 (2022 06:15), Max: 37.6 (2022 12:00)  T(F): 98.2 (2022 06:15), Max: 99.6 (2022 12:00)  HR: 84 (2022 06:15) (79 - 97)  BP: 121/70 (2022 06:15) (121/70 - 129/79)  BP(mean): --  ABP: --  ABP(mean): --  RR: 18 (2022 06:15) (17 - 18)  SpO2: 97% (2022 06:15) (97% - 100%)          I&O's Summary    2022 07:01  -  2022 07:00  --------------------------------------------------------  IN: 0 mL / OUT: 150 mL / NET: -150 mL        PHYSICAL EXAMINATION:  GENERAL: Frail thin man lying in bed  HEAD:  Atraumatic, Normocephalic  EYES: EOMI, PERRLA, conjunctiva and sclera clear  ENT: Moist mucous membranes  NECK: Supple, No JVD  CHEST/LUNG: Clear to auscultation bilaterally; No rales, rhonchi, wheezing, or rubs. Unlabored respirations  HEART: Regular rate and rhythm; No murmurs, rubs, or gallops  ABDOMEN: BSx4; Soft, tender to palpation in epigastrium, no guarding, hiccuping  EXTREMITIES:  1+ Peripheral Pulses, brisk capillary refill. long toenails  NERVOUS SYSTEM:  A&Ox3, no focal deficits   SKIN: No rashes or lesions    LABS:                          9.9    15.92 )-----------( 342      ( 2022 08:05 )             30.1     04-21    142  |  112<H>  |  22  ----------------------------<  85  3.9   |  19<L>  |  2.58<H>    Ca    8.6      2022 08:05  Phos  2.1     04-21  Mg     1.90     04-21    TPro  5.8<L>  /  Alb  2.8<L>  /  TBili  0.2  /  DBili  x   /  AST  8   /  ALT  10  /  AlkPhos  83  04-21    LIVER FUNCTIONS - ( 2022 08:05 )  Alb: 2.8 g/dL / Pro: 5.8 g/dL / ALK PHOS: 83 U/L / ALT: 10 U/L / AST: 8 U/L / GGT: x                   Urinalysis Basic - ( 2022 19:47 )    Color: Yellow / Appearance: Clear / S.022 / pH: x  Gluc: x / Ketone: Negative  / Bili: Negative / Urobili: <2 mg/dL   Blood: x / Protein: 30 mg/dL / Nitrite: Negative   Leuk Esterase: Negative / RBC: 0 /HPF / WBC 2 /HPF   Sq Epi: x / Non Sq Epi: 0 /HPF / Bacteria: Few        TELEMETRY:     EKG:     IMAGING:       Authored by Neris Galaviz MD, PGY2  PATIENT:  IMELDA ROBERTSON  [id number]    CHIEF COMPLAINT:  Patient is a 61y old  Male who presents with a chief complaint of Esophageal stricture (2022 14:20)      INTERVAL HISTORY OVERNIGHT EVENTS: YOSSI overnight. Patient states pain is still present but improves with oxycodone. Denies fevers, chills, has more energy today.         MEDICATIONS:  MEDICATIONS  (STANDING):  acetaminophen     Tablet .. 650 milliGRAM(s) Oral every 6 hours  bisacodyl Suppository 10 milliGRAM(s) Rectal daily  folic acid 1 milliGRAM(s) Oral daily  gabapentin Solution 200 milliGRAM(s) Oral at bedtime  heparin   Injectable 5000 Unit(s) SubCutaneous every 8 hours  lactulose Syrup 20 Gram(s) Oral daily  multivitamin 1 Tablet(s) Oral daily  mupirocin 2% Ointment 1 Application(s) Both Nostrils two times a day  pancrelipase  (CREON  6,000 Lipase Units) 2 Capsule(s) Oral three times a day with meals  pantoprazole   Suspension 40 milliGRAM(s) Oral two times a day  polyethylene glycol 3350 17 Gram(s) Oral two times a day  senna 2 Tablet(s) Oral at bedtime  sucralfate 1 Gram(s) Oral every 6 hours  tamsulosin 0.4 milliGRAM(s) Oral at bedtime  thiamine 100 milliGRAM(s) Oral daily    MEDICATIONS  (PRN):  chlorproMAZINE    Tablet 10 milliGRAM(s) Oral every 8 hours PRN hiccuping  cyclobenzaprine 5 milliGRAM(s) Oral three times a day PRN hiccuping  HYDROmorphone  Injectable 0.5 milliGRAM(s) IV Push every 4 hours PRN Severe Pain (7 - 10)  melatonin 3 milliGRAM(s) Oral at bedtime PRN Insomnia  oxyCODONE    Solution 10 milliGRAM(s) Oral every 4 hours PRN Severe Pain (7 - 10)      ALLERGIES:  Allergies    No Known Allergies    Intolerances        OBJECTIVE:  ICU Vital Signs Last 24 Hrs  T(C): 36.8 (2022 06:15), Max: 37.6 (2022 12:00)  T(F): 98.2 (2022 06:15), Max: 99.6 (2022 12:00)  HR: 84 (2022 06:15) (79 - 97)  BP: 121/70 (2022 06:15) (121/70 - 129/79)  BP(mean): --  ABP: --  ABP(mean): --  RR: 18 (2022 06:15) (17 - 18)  SpO2: 97% (2022 06:15) (97% - 100%)          I&O's Summary    2022 07:01  -  2022 07:00  --------------------------------------------------------  IN: 0 mL / OUT: 150 mL / NET: -150 mL        PHYSICAL EXAMINATION:  GENERAL: Frail thin man lying in bed  HEAD:  Atraumatic, Normocephalic  EYES: EOMI, PERRLA, conjunctiva and sclera clear  ENT: Moist mucous membranes  NECK: Supple, No JVD  CHEST/LUNG: Clear to auscultation bilaterally; No rales, rhonchi, wheezing, or rubs. Unlabored respirations  HEART: Regular rate and rhythm; No murmurs, rubs, or gallops  ABDOMEN: BSx4; Soft, tender to palpation in epigastrium, no guarding, hiccuping  EXTREMITIES:  1+ Peripheral Pulses, brisk capillary refill. long toenails  NERVOUS SYSTEM:  A&Ox3, no focal deficits   SKIN: No rashes or lesions    LABS:                          9.9    15.92 )-----------( 342      ( 2022 08:05 )             30.1     04-21    142  |  112<H>  |  22  ----------------------------<  85  3.9   |  19<L>  |  2.58<H>    Ca    8.6      2022 08:05  Phos  2.1     04-21  Mg     1.90     04-21    TPro  5.8<L>  /  Alb  2.8<L>  /  TBili  0.2  /  DBili  x   /  AST  8   /  ALT  10  /  AlkPhos  83  04-21    LIVER FUNCTIONS - ( 2022 08:05 )  Alb: 2.8 g/dL / Pro: 5.8 g/dL / ALK PHOS: 83 U/L / ALT: 10 U/L / AST: 8 U/L / GGT: x                   Urinalysis Basic - ( 2022 19:47 )    Color: Yellow / Appearance: Clear / S.022 / pH: x  Gluc: x / Ketone: Negative  / Bili: Negative / Urobili: <2 mg/dL   Blood: x / Protein: 30 mg/dL / Nitrite: Negative   Leuk Esterase: Negative / RBC: 0 /HPF / WBC 2 /HPF   Sq Epi: x / Non Sq Epi: 0 /HPF / Bacteria: Few        TELEMETRY:     EKG:     IMAGING:

## 2022-04-22 NOTE — DISCHARGE NOTE PROVIDER - HOSPITAL COURSE
60 yo M with hx of remote ETOH use, chronic pancreatitis, esophagitis, HCV, CKD4, initially presented to [facility] with abdominal pain. Underwent EGD 4/8 with findings of esophageal structure at 35cm from incisors, with inability to pass scope. He was transferred to [facility] for advanced gastroenterology evaluation. Patient seen and examined at bedside reports diffuse epigastric abdominal pain with 1 episode of nausea and emesis after taking 'yellow pill', per chart review appears to be 4mg dilaudid. Patient reports chronic hiccups for 'years' and recent weight loss reporting baseline weight ~215 pounds, weight on admission 126.3#. On arrival patient was febrile and tachycardic without acute complaints from baseline chronic issues.      Hospital Course:  Patient evaluated by advanced GI, and underwent EGD with esophogeal stent placed    62 yo M with hx of remote ETOH use, chronic pancreatitis, esophagitis, HCV, CKD4, initially presented to [facility] with abdominal pain. Underwent EGD 4/8 with findings of esophageal structure at 35cm from incisors, with inability to pass scope. He was transferred to [facility] for advanced gastroenterology evaluation. Patient seen and examined at bedside reports diffuse epigastric abdominal pain with 1 episode of nausea and emesis after taking 'yellow pill', per chart review appears to be 4mg dilaudid. Patient reports chronic hiccups for 'years' and recent weight loss reporting baseline weight ~215 pounds, weight on admission 126.3#. On arrival patient was febrile and tachycardic without acute complaints from baseline chronic issues.      Hospital Course:  Patient evaluated by advanced GI, and underwent EGD with esophogeal stent placed on 4/19. Patient with benign appearing stricture, biopsy obtained. Patient continued on PPI BID. Patient advanced from clear liquid diet, to pureed, to regular texture. Per GI, patient to follow with Dr. Hinds in 3-4 weeks (May 10th or May 17th) for repeat EGD for stent removal.    MRI abdomen in setting of weight loss and chronic abdominal pain to r/o malignant process. MRI limited by motion, did not reveal mass or overt concern for malignancy. MRI notable for severe dilation of the main pancreatic duct with innumerable dilated sidebranches with caliber change at the neck/body junction. Findings c/w sequela of chronic pancreatitis with obstructing intraductal calculi. Advanced GI made aware, although patient s/p stent, esophogeal stricture too narrow to pass scope for ERCP for potential removal of stones, defer possible ERCP in 3-4 weeks when patient going for re-evaluation of stone.    Patient course complicated by severe chronic constipation. Patient manually disimpacts, MR notable for stool burden. Patient started on aggressive bowel regimen, including senna, miralax, oral lactulose, dulcolax suppositories. Patient did not toleate enemas. Trialed 4L GoLytely per GI recs, and produced large BM. GI not recommending colonoscopy at this time.    Patient seen by pain management for chronic pain. Patient unable to receive morphine in setting of CKD although he reports relief with morphine in the past. Patient stabilized on regimen of tylenol prn, flexeril 5mg tid as needed, gabapentin 200mg at bedtime, oxycodone 10mg every 4 hours for severe pain. Patient to follow with chronic pain specialist outpatient.       Patient evaluated by PT, recommend d/c home with home PT. Patient refusing services. Requesting rollating walker, rx provided.       < from: Upper Endoscopy (04.19.22 @ 10:30) >    Impression:          - Benign-appearing distal esophageal stricture extending                        from 37 to 42 cm. Biopsied. Fully covered 18 mm x 10.3                        cm WallFlex esophageal stent placed.  Recommendation:      - [facility]                       - Clear liquid diet today. Can advance to pureed                        tomorrow if doing well.                       - Use a proton pump inhibitor PO BID for at least 8                        weeks.                       - Repeat EGD in 3-4 weeks for stent removal.    < end of copied text >      < from: MR Abdomen w/wo IV Cont (04.20.22 @ 14:54) >    IMPRESSION:    Severe dilation of the main pancreatic duct with innumerable dilated   sidebranches, with caliber change at the neck/body junction. Favor   sequela of chronic pancreatitis with obstructing intraductal calculi. No   obvious enhancing mass on MR within the limitations of a markedly motion   degraded exam.    Distal esophageal wall thickening with esophageal stent. Correlate with   endoscopy.    --- End of Report ---    < end of copied text >

## 2022-04-22 NOTE — DISCHARGE NOTE PROVIDER - NSDCFUSCHEDAPPT_GEN_ALL_CORE_FT
IMELDA ROBERTSON ; 04/27/2022 ; NPP Neuro Pain [address]  IMELDA ROBERTSON ; 05/04/2022 ; NPP Nephro OP [id number] [facility]  IMELDA ROBERTSON ; 06/23/2022 ; NPP Gastro OP [id number] [facility]  [facility]  Nephro OP [id number]  Scheduled Appointment: 05/04/2022    [facility]  Gastro OP [id number]  Scheduled Appointment: 06/23/2022

## 2022-04-22 NOTE — DISCHARGE NOTE PROVIDER - NSDCMRMEDTOKEN_GEN_ALL_CORE_FT
amLODIPine 10 mg oral tablet: 1 tab(s) orally once a day  folic acid 1 mg oral tablet: 1 tab(s) orally once a day  gabapentin 100 mg oral capsule: 2 cap(s) orally once a day  melatonin 3 mg oral tablet: 1 tab(s) orally once a day (at bedtime), As needed, Insomnia  Multiple Vitamins oral tablet: 1 tab(s) orally once a day  pancrelipase 6000 units-19,000 units-30,000 units oral delayed release capsule: 2 cap(s) orally 3 times a day (with meals)  pantoprazole 40 mg oral delayed release tablet: 1 tab(s) orally 2 times a day   Reglan 10 mg oral tablet: 1 tab(s) orally 4 times a day   senna oral tablet: 2 tab(s) orally once a day (at bedtime)  sucralfate 1 g oral tablet: 1 tab(s) orally every 6 hours   tamsulosin 0.4 mg oral capsule: 1 cap(s) orally once a day (at bedtime)  thiamine 100 mg oral tablet: 1 tab(s) orally once a day   bisacodyl 10 mg rectal suppository: 1 suppository(ies) rectal once a day  chlorproMAZINE 10 mg oral tablet: 1 tab(s) orally every 8 hours, As needed, hiccuping  cyclobenzaprine 5 mg oral tablet: 1 tab(s) orally 3 times a day, As needed, hiccuping  folic acid 1 mg oral tablet: 1 tab(s) orally once a day  gabapentin 250 mg/5 mL oral solution: 4 milliliter(s) orally once a day (at bedtime)  lactulose 10 g/15 mL oral syrup: 30 milliliter(s) orally once a day as needed for constipation  melatonin 3 mg oral tablet: 1 tab(s) orally once a day (at bedtime), As needed, Insomnia  Multiple Vitamins oral tablet: 1 tab(s) orally once a day  oxyCODONE 10 mg oral tablet: 1 tab(s) orally every 4 hours, As needed, Severe Pain (7 - 10) MDD:60mg  pancrelipase 6000 units-19,000 units-30,000 units oral delayed release capsule: 2 cap(s) orally 3 times a day (with meals)  pantoprazole 40 mg oral delayed release tablet: 1 tab(s) orally 2 times a day   polyethylene glycol 3350 oral powder for reconstitution: 17 gram(s) orally 2 times a day  Rolling Walker: One rollating walker for debility (.3)  senna oral tablet: 2 tab(s) orally once a day (at bedtime)  sucralfate 1 g oral tablet: 1 tab(s) orally every 6 hours   tamsulosin 0.4 mg oral capsule: 1 cap(s) orally once a day (at bedtime)  thiamine 100 mg oral tablet: 1 tab(s) orally once a day   bisacodyl 10 mg rectal suppository: 1 suppository(ies) rectal once a day  chlorproMAZINE 10 mg oral tablet: 1 tab(s) orally every 8 hours, As needed, hiccuping MDD:30mg  cyclobenzaprine 5 mg oral tablet: 1 tab(s) orally 3 times a day, As needed, hiccuping MDD:15mg  folic acid 1 mg oral tablet: 1 tab(s) orally once a day  gabapentin 250 mg/5 mL oral solution: 4 milliliter(s) orally once a day (at bedtime) MDD:200mg  HYDROmorphone: 0.5 milligram(s) intravenous every 4 hours (5 times/day)  lactulose 10 g/15 mL oral syrup: 30 milliliter(s) orally once a day as needed for constipation  oxyCODONE 10 mg oral tablet: 1 tab(s) orally every 4 hours, As Needed -for severe pain MDD:6 tablets   pancrelipase 6000 units-19,000 units-30,000 units oral delayed release capsule: 2 cap(s) orally 3 times a day (with meals)  pantoprazole 40 mg oral delayed release tablet: 1 tab(s) orally 2 times a day   polyethylene glycol 3350 oral powder for reconstitution: 17 gram(s) orally 2 times a day  senna oral tablet: 2 tab(s) orally once a day (at bedtime)  sucralfate 1 g oral tablet: 1 tab(s) orally every 6 hours   tamsulosin 0.4 mg oral capsule: 1 cap(s) orally once a day (at bedtime)  thiamine 100 mg oral tablet: 1 tab(s) orally once a day

## 2022-04-22 NOTE — PROGRESS NOTE ADULT - ATTENDING COMMENTS
Patient seen and examined, care plan discussed with house staff as above.    61yoM w hx of chronic pancreatitis (2/2 ?remote etoh use), CKD Stage IV, HCV (s/p 8 weeks treatment, unclear when), hx of gastric perf w peritonitis and MSSA bacteremia in 2019 requiring ex lap and prolonged hospitalization, 100 lb weight loss unintentionally, transferred from  with esophageal stricture w intractable hiccups and epigastric pain (persists); in need of advanced GI specialist as scope could not be advanced in prior EGD at  4/6. Pt denies dysphagia, described ongoing pain of esophagus.     Febrile on admission to tmax of 102F s/p 1 dose of zosyn. Infectious work up neg-- CXR without infiltrate, though patient is high risk for aspiration event, blood cultures, urine cultures NGTD, HIV neg 4/6, no evidence of necrotizing pancreatitis on CT (continues to show pancreatic ductal dilation).  Note lytic lesion of iliac crest L side on CT ap 4/6, stable from prior imaging.  Prior EGD with biopsy Jan 2022 showed gastritis/esophagitis, biopsy obtained at  4/6 w path concerning for pill esophagitis, H pylori neg on initial EGD bx in Dec 2021. CEA 4.5 in 2019. F/u CA-19-9 (high 152) and CEA (nml 3.7) this admission. Baseline EKG 4/14 nonischemic. Initially, high concern for malignancy, however w resulted MR abd (limited by motion artifact), this is now lower on the differential.    Esophageal stricture w esophagitis- Cont PPI BID, transitioned to solution 4/19. GI w EGD Tues 4/19, esophageal stent placed, bx sent (results pending); needs f/u EGD in 3-4 weeks for removal of stent. Ongoing pain related to esophagitis--> transition all pill meds to solution. Cont carafate, PPI. Pt declines dilaudid, wants morphine (would not give iso CKD IV). Solution oxycodone is improving pain but still not resolved, dilaudid for breakthrough pain. MRI chest/abd/pelv ordered, results limited by motion but did show severe ductal dilation. Chest pain ongoing issue even post stent placement. Repeat EKG nonischemic 4/20, CXR, abd XR to r/o free air in abd on 4/20. Pain improving on 4/22 after large BM on 4/21.     Pancreatic ductal dilation, severe- noted on MR abd 4/20, also present on prior CT imaging.  MR w/o evidence of pancreatic mass however study limited by motion artifact. Based on my read and radiology report, unclear if he has an obstructive stone or not. Consider autoimmune pancreatitis work up. D/w advanced GI team. No plans from GI for ERCP at present given severity of stricture and inability to pass US. Will reassess post stent removal.     Leukocytosis, worsening 4/21, resolved 4/22- High risk for aspiration event, CXR without infiltrate on 4/20. Blood cultures 4/21. No evidence of diverticulitis or necrotizing pancreatitis on MR abd 4/20. Bladder scan 4/20 w PVR, if positive, will get UA/culture. Gallstone pancreatitis ?     Constipation- treat aggressively w BID miralax/senna and daily enema until BM. (Pt has been refusing enema), improved.     Hypernatremia, resolved- Suspect 2/2 decr oral itnake (NPO pre procedure 4/19). Trial of IVF, improved. Patient seen and examined, care plan discussed with house staff as above.    61yoM w hx of chronic pancreatitis (2/2 ?remote etoh use), CKD Stage IV, HCV (s/p 8 weeks treatment, unclear when), hx of gastric perf w peritonitis and MSSA bacteremia in 2019 requiring ex lap and prolonged hospitalization, 100 lb weight loss unintentionally, transferred from  with esophageal stricture w intractable hiccups and epigastric pain (persists); in need of advanced GI specialist as scope could not be advanced in prior EGD at  4/6. Pt denies dysphagia, described ongoing pain of esophagus.     Febrile on admission to tmax of 102F s/p 1 dose of zosyn. Infectious work up neg-- CXR without infiltrate, though patient is high risk for aspiration event, blood cultures, urine cultures NGTD, HIV neg 4/6, no evidence of necrotizing pancreatitis on CT (continues to show pancreatic ductal dilation).  Note lytic lesion of iliac crest L side on CT ap 4/6, stable from prior imaging.  Prior EGD with biopsy Jan 2022 showed gastritis/esophagitis, biopsy obtained at  4/6 w path concerning for pill esophagitis, H pylori neg on initial EGD bx in Dec 2021. CEA 4.5 in 2019. F/u CA-19-9 (high 152) and CEA (nml 3.7) this admission. Baseline EKG 4/14 nonischemic. Initially, high concern for malignancy, however w resulted MR abd (limited by motion artifact), this is now lower on the differential.    Esophageal stricture w esophagitis- Cont PPI BID, transitioned to solution 4/19. GI w EGD Tues 4/19, esophageal stent placed, bx sent (results pending); needs f/u EGD in 3-4 weeks for removal of stent. Ongoing pain related to esophagitis--> transition all pill meds to solution. Cont carafate, PPI. Pt declines dilaudid, wants morphine (would not give iso CKD IV). Solution oxycodone is improving pain but still not resolved, dilaudid for breakthrough pain. MRI chest/abd/pelv ordered, results limited by motion but did show severe ductal dilation. Chest pain ongoing issue even post stent placement. Repeat EKG nonischemic 4/20, CXR, abd XR to r/o free air in abd on 4/20. Pain improving on 4/22 after large BM on 4/21.     Pancreatic ductal dilation, severe- noted on MR abd 4/20, also present on prior CT imaging.  MR w/o evidence of pancreatic mass however study limited by motion artifact. Based on my read and radiology report, unclear if he has an obstructive stone or not. Consider autoimmune pancreatitis work up. D/w advanced GI team. No plans from GI for ERCP at present given severity of stricture and inability to pass US. Will reassess post stent removal.     Leukocytosis, worsening 4/21, resolved 4/22- High risk for aspiration event, CXR without infiltrate on 4/20. Blood cultures 4/21. No evidence of diverticulitis or necrotizing pancreatitis on MR abd 4/20. Bladder scan 4/20 w PVR, if positive, will get UA/culture. Gallstone pancreatitis ?     Constipation- treat aggressively w BID miralax/senna and daily enema until BM. (Pt has been refusing enema), improved.     Hypernatremia, resolved- Suspect 2/2 decr oral itnake (NPO pre procedure 4/19). Trial of IVF, improved.    Note* pt insisting on regular diet. Risks/benefits discussed. Changed to regular diet.

## 2022-04-22 NOTE — DISCHARGE NOTE PROVIDER - CARE PROVIDER_API CALL
Eron Hinds)  Gastroenterology; Internal Medicine  [address] [place] [address]  Phone: [phone]  Fax: [phone]  Follow Up Time: 1 month    Lamberto Hurtado)  Internal Medicine  [address] [place] [address]  Phone: [phone]  Fax: [phone]  Follow Up Time: 1 week

## 2022-04-22 NOTE — PROGRESS NOTE ADULT - PROBLEM SELECTOR PLAN 1
Patient found to have esophageal stricture 35cm from incisure, unable to pass EGD scope on 4/8. Transferred to [facility] for advanced GI eval.  - now s/p EGD with stent 4/19, on pureed diet today, will progress to soft and bite size today   -Aspiration precautions, head of bed elevation  -per EGD report, restriction characterized as benign, biopsy obtained; will proceed with MRI abdomen with IV contrast to r/o any other underling malignancy; stricture may be 2/2 chronic esophogitis. f/u biopsy.   -MR abdomen with esophogeal thickening, no obvious mass  -c/w PPI PO for total of 8 weeks on BID dosing, then daily  -patient needs outpatient f/u with Dr. Hinds in 3-4 weeks for repeat EGD to remove stent. Patient can call [phone] to schedule.

## 2022-04-22 NOTE — PROGRESS NOTE ADULT - PROBLEM SELECTOR PLAN 5
Patient with large stool burden, unable to pass stool without manual extraction  -c/w senna, Miralax, dulcolax suppository, enemas prn, will add lactulose by mouth  -ordered GoLytely yesterday per GI recs  -Linzess in setting of opioid use?  -consider malignant process, GI consult for role of colonoscopy, state no role for colonoscopy at this time  -patient with large stool 4/21 on more aggressive bowel regimen

## 2022-04-22 NOTE — DISCHARGE NOTE PROVIDER - PROVIDER TOKENS
PROVIDER:[TOKEN:[[id number]],FOLLOWUP:[1 month]],PROVIDER:[TOKEN:[[id number]],FOLLOWUP:[1 week]]

## 2022-04-22 NOTE — PROGRESS NOTE ADULT - PROBLEM SELECTOR PLAN 3
Patient with fever on admission to 39.3 with tachycardia. Unclear infectious source, no leukocytosis. S/p 1 dose of zosyn. Consider tumor fever, low suspicion now for infection.   -blood cxs: NGTD  -UA/urine culture negative  -AP CXR without focal consolidation  -Monitor off further antibiotics unless fever recurs  -4/20: with leukocytosis, likely reactive s/p procedure, increasing today with borderline high normal temperatue 4/21,  f/u rectal temp, if febrile, will culture, start empiric zosyn for possible GI infection. CXR and AXR without free air.

## 2022-04-22 NOTE — DISCHARGE NOTE PROVIDER - NSDCCPTREATMENT_GEN_ALL_CORE_FT
PRINCIPAL PROCEDURE  Procedure: Endoscopy, UGI  Findings and Treatment:   < end of copied text >  Impression:          - Benign-appearing distal esophageal stricture extending                        from 37 to 42 cm. Biopsied. Fully covered 18 mm x 10.3                        cm WallFlex esophageal stent placed.  Recommendation:      - [facility].                       - Clear liquid diet today. Can advance to pureed                        tomorrow if doing well.                       - Use a proton pump inhibitor PO BID for at least 8                        weeks.                       - Repeat EGD in 3-4 weeks for stent removal.< from: Upper Endoscopy (04.19.22 @ 10:30) >        SECONDARY PROCEDURE  Procedure: MR abdomen  Findings and Treatment:   < end of copied text >  IMPRESSION:  Severe dilation of the main pancreatic duct with innumerable dilated   sidebranches, with caliber change at the neck/body junction. Favor   sequela of chronic pancreatitis with obstructing intraductal calculi. No   obvious enhancing mass on MR within the limitations of a markedly motion   degraded exam.  Distal esophageal wall thickening with esophageal stent. Correlate with   endoscopy.  --- End of Report ---< from: MR Abdomen w/wo IV Cont (04.20.22 @ 14:54) >

## 2022-04-22 NOTE — PROGRESS NOTE ADULT - PROBLEM SELECTOR PLAN 4
pt reports 10/10 chest pain - now more likely 2/2 chronic pancreatitis given no improvement in pain s/p esophogeal stent. Also playing a role is chronic esophagitis.   -low suspicion for cardiac etiology - EKG: without ischemic changes, troponin negative   - pain management rec oxy 10mg q4h prn, tylenol 650mg ATC x2 days, gabapentin renally dosed, and flexeril standing for now; flexeril changed to prn as patient was more lethargic on standing flexeril. May also decrease gabapentin to 200mg given lethargy.    -c/w thorazine  -appreciate pain management recs

## 2022-04-22 NOTE — PROGRESS NOTE ADULT - PROBLEM SELECTOR PLAN 2
Hx of pancreatitis with CT evidence of chronic pancreatitis.  -C/w CREON  -patient's MRI notable for severe dilation of the main pancreatic duct with innumerable dilated sidebranches, with caliber change at the neck/body junction. Favor sequela of chronic pancreatitis with obstructing intraductal calculi.  -advanced GI aware, deferring intervention to remove possible stones in 3-4 weeks as stricture too severe to pass scope for stone retrieval, as per GI.

## 2022-04-22 NOTE — PROGRESS NOTE ADULT - ASSESSMENT
60 yo M with hx of remote ETOH use, chronic pancreatitis, esophagitis, HCV s/p treatment, CKD4, initially presented to [facility] with abdominal pain found to have esophageal stricture on EGD transferred to [facility] for advanced GI evaluation.

## 2022-04-22 NOTE — DISCHARGE NOTE PROVIDER - NSDCCPCAREPLAN_GEN_ALL_CORE_FT
PRINCIPAL DISCHARGE DIAGNOSIS  Diagnosis: Esophageal stricture  Assessment and Plan of Treatment: You have a stricture, or severe narrowing of your esophogus. This may be secondary to chronic inflamation of the esophogus, called esophogitis. Our advanced GI team saw you and you underwent an endoscopy and a stent was placed on 4/19 which helped open up the esophogus, however a substantial stricture remains. GI recommends repeat endoscopy and stent evaluation in 3-4 weeks (weeks of May 3 and May 10). Call : to schedule this endoscopy. Continue with pantoprazole twice daily for total of 8 weeks to decrease inflammation around esophogus. Please chew your food well, and opt for liquid forms of medications to avoid obstruction and further inflamation from pill which may get stuck in esophogus.      SECONDARY DISCHARGE DIAGNOSES  Diagnosis: Chronic pancreatitis  Assessment and Plan of Treatment: You have chronic inflammation of the pancreas. MR abdomen revealed enlarged ducts in the liver with stones. GI is unable to intervene on these stones at this time due to active stricture and stent in esophogus, and recommend evaluation with endoscopy in 3-4 weeks (weeks of May 3 and May 10) to see if any stone removal is indicated/possible. Continue with the pain medications as prescribed. Ensure you remain hydrated at home.    Diagnosis: Constipation  Assessment and Plan of Treatment:      PRINCIPAL DISCHARGE DIAGNOSIS  Diagnosis: Esophageal stricture  Assessment and Plan of Treatment: You have a stricture, or severe narrowing of your esophogus. This may be secondary to chronic inflamation of the esophogus, called esophogitis. Our advanced GI team saw you and you underwent an endoscopy and a stent was placed on 4/19 which helped open up the esophogus, however a substantial stricture remains. GI recommends repeat endoscopy and stent evaluation in 3-4 weeks (weeks of May 3 and May 10). Call [phone] (Dr. Hinds's office) to schedule this endoscopy. Continue with pantoprazole twice daily for total of 8 weeks to decrease inflammation around esophogus. Please chew your food well, and opt for liquid forms of medications to avoid obstruction and further inflamation from pill which may get stuck in esophogus.      SECONDARY DISCHARGE DIAGNOSES  Diagnosis: Chronic pancreatitis  Assessment and Plan of Treatment: You have chronic inflammation of the pancreas. MR abdomen revealed enlarged ducts in the liver with stones. GI is unable to intervene on these stones at this time due to active stricture and stent in esophogus, and recommend evaluation with endoscopy in 3-4 weeks (weeks of May 3 and May 10) to see if any stone removal is indicated/possible. Continue with the pain medications as prescribed. Ensure you remain hydrated at home.    Diagnosis: Constipation  Assessment and Plan of Treatment: You have severe constipation. Please continue with senna, miralax, oral lactulose, and drink fluids at home. Please follow with your GI doctor for further monitoring and management.

## 2022-04-22 NOTE — DISCHARGE NOTE PROVIDER - DETAILS OF MALNUTRITION DIAGNOSIS/DIAGNOSES
This patient has been assessed with a concern for Malnutrition and was treated during this hospitalization for the following Nutrition diagnosis/diagnoses:     -  04/16/2022: Severe protein-calorie malnutrition   -  04/16/2022: Underweight (BMI < 19)

## 2022-04-23 ENCOUNTER — TRANSCRIPTION ENCOUNTER (OUTPATIENT)
Age: 62
End: 2022-04-23

## 2022-04-23 VITALS
HEART RATE: 77 BPM | OXYGEN SATURATION: 100 % | SYSTOLIC BLOOD PRESSURE: 165 MMHG | TEMPERATURE: 98 F | DIASTOLIC BLOOD PRESSURE: 82 MMHG | RESPIRATION RATE: 19 BRPM

## 2022-04-23 PROCEDURE — 99239 HOSP IP/OBS DSCHRG MGMT >30: CPT | Mod: GC

## 2022-04-23 RX ORDER — CYCLOBENZAPRINE HYDROCHLORIDE 10 MG/1
1 TABLET, FILM COATED ORAL
Qty: 90 | Refills: 0
Start: 2022-04-23 | End: 2022-05-22

## 2022-04-23 RX ORDER — LACTULOSE 10 G/15ML
30 SOLUTION ORAL
Qty: 900 | Refills: 0
Start: 2022-04-23 | End: 2022-05-22

## 2022-04-23 RX ORDER — POLYETHYLENE GLYCOL 3350 17 G/17G
17 POWDER, FOR SOLUTION ORAL
Qty: 1020 | Refills: 0
Start: 2022-04-23 | End: 2022-05-22

## 2022-04-23 RX ORDER — OXYCODONE HYDROCHLORIDE 5 MG/1
1 TABLET ORAL
Qty: 42 | Refills: 0
Start: 2022-04-23 | End: 2022-04-29

## 2022-04-23 RX ORDER — CHLORPROMAZINE HCL 10 MG
1 TABLET ORAL
Qty: 42 | Refills: 0
Start: 2022-04-23 | End: 2022-05-06

## 2022-04-23 RX ORDER — OXYCODONE HYDROCHLORIDE 5 MG/1
1 TABLET ORAL
Qty: 180 | Refills: 0
Start: 2022-04-23 | End: 2022-05-22

## 2022-04-23 RX ORDER — PANTOPRAZOLE SODIUM 20 MG/1
1 TABLET, DELAYED RELEASE ORAL
Qty: 60 | Refills: 2
Start: 2022-04-23 | End: 2022-07-21

## 2022-04-23 RX ORDER — GABAPENTIN 400 MG/1
4 CAPSULE ORAL
Qty: 120 | Refills: 0
Start: 2022-04-23 | End: 2022-05-22

## 2022-04-23 RX ORDER — SENNA PLUS 8.6 MG/1
2 TABLET ORAL
Qty: 60 | Refills: 0
Start: 2022-04-23 | End: 2022-05-22

## 2022-04-23 RX ORDER — FOLIC ACID 0.8 MG
1 TABLET ORAL
Qty: 30 | Refills: 0
Start: 2022-04-23 | End: 2022-05-22

## 2022-04-23 RX ORDER — LANOLIN ALCOHOL/MO/W.PET/CERES
1 CREAM (GRAM) TOPICAL
Qty: 30 | Refills: 0
Start: 2022-04-23 | End: 2022-05-22

## 2022-04-23 RX ORDER — AMLODIPINE BESYLATE 2.5 MG/1
1 TABLET ORAL
Qty: 0 | Refills: 0 | DISCHARGE

## 2022-04-23 RX ADMIN — OXYCODONE HYDROCHLORIDE 10 MILLIGRAM(S): 5 TABLET ORAL at 06:16

## 2022-04-23 RX ADMIN — OXYCODONE HYDROCHLORIDE 10 MILLIGRAM(S): 5 TABLET ORAL at 11:59

## 2022-04-23 RX ADMIN — OXYCODONE HYDROCHLORIDE 10 MILLIGRAM(S): 5 TABLET ORAL at 12:59

## 2022-04-23 RX ADMIN — OXYCODONE HYDROCHLORIDE 10 MILLIGRAM(S): 5 TABLET ORAL at 16:41

## 2022-04-23 RX ADMIN — Medication 1 GRAM(S): at 06:17

## 2022-04-23 RX ADMIN — Medication 1 TABLET(S): at 11:54

## 2022-04-23 RX ADMIN — Medication 2 CAPSULE(S): at 09:07

## 2022-04-23 RX ADMIN — Medication 2 CAPSULE(S): at 11:54

## 2022-04-23 RX ADMIN — MUPIROCIN 1 APPLICATION(S): 20 OINTMENT TOPICAL at 06:08

## 2022-04-23 RX ADMIN — Medication 1 MILLIGRAM(S): at 11:54

## 2022-04-23 RX ADMIN — OXYCODONE HYDROCHLORIDE 10 MILLIGRAM(S): 5 TABLET ORAL at 07:16

## 2022-04-23 RX ADMIN — Medication 1 GRAM(S): at 11:54

## 2022-04-23 RX ADMIN — PANTOPRAZOLE SODIUM 40 MILLIGRAM(S): 20 TABLET, DELAYED RELEASE ORAL at 06:19

## 2022-04-23 RX ADMIN — HEPARIN SODIUM 5000 UNIT(S): 5000 INJECTION INTRAVENOUS; SUBCUTANEOUS at 06:11

## 2022-04-23 RX ADMIN — LACTULOSE 20 GRAM(S): 10 SOLUTION ORAL at 11:54

## 2022-04-23 RX ADMIN — Medication 100 MILLIGRAM(S): at 11:54

## 2022-04-23 RX ADMIN — POLYETHYLENE GLYCOL 3350 17 GRAM(S): 17 POWDER, FOR SOLUTION ORAL at 06:18

## 2022-04-23 NOTE — PROVIDER CONTACT NOTE (OTHER) - ACTION/TREATMENT ORDERED:
Give IV tylenol
MD Stated ok to give oxycodone and to assess pain after, if pain persists then cardiac work up will be ordered.
MD aware and stated pt must not go down for MRI without LR bolus.
MD said to give night nurse slips and she is aware that UA is pending collection.
Multiple attempts to contact team 8 regarding pt's medication. pt unable to afford meds at [facility] at this time but will have brother come monday to  medications

## 2022-04-23 NOTE — PROGRESS NOTE ADULT - ATTENDING SUPERVISION STATEMENT
Fellow
Resident

## 2022-04-23 NOTE — PROGRESS NOTE ADULT - ASSESSMENT
62 yo M with hx of remote ETOH use, chronic pancreatitis, esophagitis, HCV s/p treatment, CKD4, initially presented to [facility] with abdominal pain found to have esophageal stricture on EGD transferred to [facility] for advanced GI evaluation. 62 yo M with hx of remote ETOH use, chronic pancreatitis, esophagitis, HCV s/p treatment, CKD4, initially presented to [facility] with abdominal pain found to have esophageal stricture on EGD transferred to [facility] for advanced GI evaluation. S/p esophageal stent, to followup with GI as outpatient for further management.

## 2022-04-23 NOTE — PROVIDER CONTACT NOTE (OTHER) - ASSESSMENT
Pt reporting chest, gastric, shoulder and back pain. Pt reports chest tightness. [id number], 100%, 79 HR. Oxycodone given
Pt is stable at this time
Pt pre-medicated with ativan
Multiple attempts to contact team 8 regarding pt's medication. pt unable to afford meds at [facility] at this time but will have brother come monday to  medications
Pt is sweating

## 2022-04-23 NOTE — PROGRESS NOTE ADULT - PROBLEM SELECTOR PLAN 6
Patient initially presented with YUNIEL on CKD. Improved with IVF. Now at serum Cr baseline.  -Strict I/Os  -Avoid nephrotoxins  -Dose meds per eGFR Serum Cr appears at baseline.  -Strict I/Os  -Avoid nephrotoxins  -Dose meds per eGFR

## 2022-04-23 NOTE — DISCHARGE NOTE NURSING/CASE MANAGEMENT/SOCIAL WORK - NSDCPEFALRISK_GEN_ALL_CORE
For information on Fall & Injury Prevention, visit: [email or web address] OR  [email or web address] OR  https://www.cdc.gov/steadi/patient.html

## 2022-04-23 NOTE — PROGRESS NOTE ADULT - REASON FOR ADMISSION
Esophageal stricture

## 2022-04-23 NOTE — PROGRESS NOTE ADULT - PROBLEM SELECTOR PLAN 3
Patient with fever on admission to 39.3 with tachycardia. Unclear infectious source, no leukocytosis. S/p 1 dose of zosyn. Consider tumor fever, low suspicion now for infection.   -blood cxs: NGTD  -UA/urine culture negative  -AP CXR without focal consolidation  -Monitor off further antibiotics unless fever recurs  -4/20: with leukocytosis, likely reactive s/p procedure, increasing today with borderline high normal temperature 4/21,  f/u rectal temp, if febrile, will culture, start empiric zosyn for possible GI infection. CXR and AXR without free air. BCx from 4/21 NGTD

## 2022-04-23 NOTE — PROGRESS NOTE ADULT - ATTENDING COMMENTS
Patient seen and examined, d/w Dr. Galaviz, agree w/ above.     60 yo M w/ etoh abuse, c/b chronic pancreatitis, HCV, CKDIV, transferred to [facility] for further management of esophageal stricture, s/p EGD and placement of esophageal stent on 4/19, to followup otpt w/ GI for further management, possible ERCP for stone removal (stricture currently too severe to pass scope for procedure) eventual stent removal as per GI. To continue ppi bid x 8 weeks. To continue w/ symptomatic management of chronic pain. Counseled importance of outpatient GI and PMD followup, patient amenable to d/c home today.     Discharge time 31 minutes.

## 2022-04-23 NOTE — PROGRESS NOTE ADULT - PROVIDER SPECIALTY LIST ADULT
Gastroenterology
Anesthesia
Gastroenterology
Internal Medicine

## 2022-04-23 NOTE — DISCHARGE NOTE NURSING/CASE MANAGEMENT/SOCIAL WORK - PATIENT PORTAL LINK FT
You can access the FollowMyHealth Patient Portal offered by [facility] by registering at the following website: [email or web address]. By joining [employer]’s FollowMyHealth portal, you will also be able to view your health information using other applications (apps) compatible with our system.

## 2022-04-23 NOTE — PROVIDER CONTACT NOTE (OTHER) - SITUATION
Multiple attempts to contact team 8 regarding pt's medication. pt unable to afford meds at [facility] at this time but will have brother come monday to  medications
Pt temp 102.2, HR:105
MRI cancelled as pt can not go down with IV fluids. Pt pre-medicated with ativan. As per md Pt must get bolus LR before MRI.
Pt has a UA pending but discarded his urine. Report will be given to night nurse to send down UA.
Pt reporting chest, gastric, shoulder and back pain. Pt reports chest tightness. [id number], 100%, 79 HR.

## 2022-04-23 NOTE — PROVIDER CONTACT NOTE (OTHER) - RECOMMENDATIONS
Multiple attempts to contact team 8 regarding pt's medication. pt unable to afford meds at [facility] at this time but will have brother come monday to  medications
Give IV tylenol
Give night nurse the slips so she can send down UA
Pain medication, assess need for cardiac work up

## 2022-04-23 NOTE — PROGRESS NOTE ADULT - PROBLEM SELECTOR PLAN 1
Patient found to have esophageal stricture 35cm from incisure, unable to pass EGD scope on 4/8. Transferred to [facility] for advanced GI eval.  - now s/p EGD with stent 4/19, on pureed diet today, will progress to soft and bite size today   -Aspiration precautions, head of bed elevation  -per EGD report, restriction characterized as benign, biopsy obtained; will proceed with MRI abdomen with IV contrast to r/o any other underling malignancy; stricture may be 2/2 chronic esophogitis. f/u biopsy.   -MR abdomen with esophogeal thickening, no obvious mass  -c/w PPI PO for total of 8 weeks on BID dosing, then daily  -patient needs outpatient f/u with Dr. Hinds in 3-4 weeks for repeat EGD to remove stent. Patient can call [phone] to schedule. Patient found to have esophageal stricture 35cm from incisure, unable to pass EGD scope on 4/8. Transferred to [facility] for advanced GI eval.  - now s/p EGD with stent 4/19, progressed to regular diet 4/22  -Aspiration precautions, head of bed elevation  -per EGD report, restriction characterized as benign, biopsy obtained; obtained MRI abdomen with IV contrast to r/o any other underling malignancy; stricture may be 2/2 chronic esophogitis. f/u biopsy.   -MR abdomen with esophogeal thickening, no obvious mass  -c/w PPI PO for total of 8 weeks on BID dosing, then daily  -patient needs outpatient f/u with Dr. Hinds in 3-4 weeks for repeat EGD to remove stent. Patient can call [phone] to schedule.

## 2022-04-23 NOTE — PROGRESS NOTE ADULT - PROBLEM SELECTOR PLAN 4
pt reports 10/10 chest pain - now more likely 2/2 chronic pancreatitis given no improvement in pain s/p esophogeal stent. Also playing a role is chronic esophagitis.   -low suspicion for cardiac etiology - EKG: without ischemic changes, troponin negative   - pain management rec oxy 10mg q4h prn, tylenol 650mg ATC x2 days, gabapentin renally dosed, and flexeril standing for now; flexeril changed to prn as patient was more lethargic on standing flexeril. May also decrease gabapentin to 200mg given lethargy.    -c/w thorazine, patient reports improvement with thorazine   -appreciate pain management recs

## 2022-04-23 NOTE — PROGRESS NOTE ADULT - NUTRITIONAL ASSESSMENT
This patient has been assessed with a concern for Malnutrition and has been determined to have a diagnosis/diagnoses of Severe protein-calorie malnutrition and Underweight (BMI < 19).    This patient is being managed with:   Diet Pureed-  Supplement Feeding Modality:  Oral  Ensure Enlive Cans or Servings Per Day:  3       Frequency:  Three Times a day  Entered: Apr 20 2022 12:54PM    
This patient has been assessed with a concern for Malnutrition and has been determined to have a diagnosis/diagnoses of Severe protein-calorie malnutrition and Underweight (BMI < 19).    This patient is being managed with:   Diet NPO after Midnight-     NPO Start Date: 17-Apr-2022   NPO Start Time: 23:59  Except Medications  Entered: Apr 17 2022  8:11AM    Diet Regular-  Supplement Feeding Modality:  Oral  Nepro Cans or Servings Per Day:  1       Frequency:  Two Times a day  Entered: Apr 15 2022  1:08PM    
This patient has been assessed with a concern for Malnutrition and has been determined to have a diagnosis/diagnoses of Severe protein-calorie malnutrition and Underweight (BMI < 19).    This patient is being managed with:   Diet Pureed-  Supplement Feeding Modality:  Oral  Ensure Enlive Cans or Servings Per Day:  3       Frequency:  Three Times a day  Entered: Apr 20 2022 12:54PM    
This patient has been assessed with a concern for Malnutrition and has been determined to have a diagnosis/diagnoses of Severe protein-calorie malnutrition and Underweight (BMI < 19).    This patient is being managed with:   Diet Regular-  Supplement Feeding Modality:  Oral  Ensure Enlive Cans or Servings Per Day:  3       Frequency:  Three Times a day  Entered: Apr 22 2022  9:54AM    
This patient has been assessed with a concern for Malnutrition and has been determined to have a diagnosis/diagnoses of Severe protein-calorie malnutrition and Underweight (BMI < 19).    This patient is being managed with:   Diet NPO after Midnight-     NPO Start Date: 18-Apr-2022   NPO Start Time: 23:59  Except Medications  Entered: Apr 18 2022 12:51PM    Diet Regular-  Supplement Feeding Modality:  Oral  Nepro Cans or Servings Per Day:  1       Frequency:  Two Times a day  Entered: Apr 15 2022  1:08PM    
This patient has been assessed with a concern for Malnutrition and has been determined to have a diagnosis/diagnoses of Severe protein-calorie malnutrition and Underweight (BMI < 19).    This patient is being managed with:   Diet NPO after Midnight-     NPO Start Date: 17-Apr-2022   NPO Start Time: 23:59  Except Medications  Entered: Apr 17 2022  8:11AM    Diet Regular-  Supplement Feeding Modality:  Oral  Nepro Cans or Servings Per Day:  1       Frequency:  Two Times a day  Entered: Apr 15 2022  1:08PM    
This patient has been assessed with a concern for Malnutrition and has been determined to have a diagnosis/diagnoses of Severe protein-calorie malnutrition and Underweight (BMI < 19).    This patient is being managed with:   Diet Pureed-  Supplement Feeding Modality:  Oral  Nepro Cans or Servings Per Day:  2       Frequency:  Two Times a day  Entered: Apr 20 2022  8:08AM

## 2022-04-23 NOTE — PROGRESS NOTE ADULT - SUBJECTIVE AND OBJECTIVE BOX
Authored by Neris Galaviz MD, PGY2  PATIENT:  IMELDA ROBERTSON  [id number]    CHIEF COMPLAINT:  Patient is a 61y old  Male who presents with a chief complaint of Esophageal stricture (2022 14:18)      INTERVAL HISTORY OVERNIGHT EVENTS: YOSSI overnight.           MEDICATIONS:  MEDICATIONS  (STANDING):  bisacodyl Suppository 10 milliGRAM(s) Rectal daily  folic acid 1 milliGRAM(s) Oral daily  gabapentin Solution 200 milliGRAM(s) Oral at bedtime  heparin   Injectable 5000 Unit(s) SubCutaneous every 8 hours  lactulose Syrup 20 Gram(s) Oral daily  multivitamin 1 Tablet(s) Oral daily  pancrelipase  (CREON  6,000 Lipase Units) 2 Capsule(s) Oral three times a day with meals  pantoprazole   Suspension 40 milliGRAM(s) Oral two times a day  polyethylene glycol 3350 17 Gram(s) Oral two times a day  senna 2 Tablet(s) Oral at bedtime  sucralfate 1 Gram(s) Oral every 6 hours  tamsulosin 0.4 milliGRAM(s) Oral at bedtime  thiamine 100 milliGRAM(s) Oral daily    MEDICATIONS  (PRN):  chlorproMAZINE    Tablet 10 milliGRAM(s) Oral every 8 hours PRN hiccuping  cyclobenzaprine 5 milliGRAM(s) Oral three times a day PRN hiccuping  HYDROmorphone  Injectable 0.5 milliGRAM(s) IV Push every 4 hours PRN Severe Pain (7 - 10)  melatonin 3 milliGRAM(s) Oral at bedtime PRN Insomnia  oxyCODONE    IR 10 milliGRAM(s) Oral every 4 hours PRN Severe Pain (7 - 10)      ALLERGIES:  Allergies    No Known Allergies    Intolerances        OBJECTIVE:  ICU Vital Signs Last 24 Hrs  T(C): 36.6 (2022 21:39), Max: 36.7 (2022 14:30)  T(F): 97.9 (2022 21:39), Max: 98.1 (2022 14:30)  HR: 88 (2022 21:39) (80 - 88)  BP: 135/77 (2022 21:39) (118/88 - 135/77)  BP(mean): --  ABP: --  ABP(mean): --  RR: 18 (2022 21:39) (18 - 18)  SpO2: 100% (2022 21:39) (98% - 100%)                PHYSICAL EXAMINATION:  GENERAL: thin man lying in bed  HEAD:  Atraumatic, Normocephalic  EYES: EOMI, PERRLA, conjunctiva and sclera clear  ENT: Moist mucous membranes  NECK: Supple, No JVD  CHEST/LUNG: Clear to auscultation bilaterally; No rales, rhonchi, wheezing, or rubs. Unlabored respirations  HEART: Regular rate and rhythm; No murmurs, rubs, or gallops  ABDOMEN: BSx4; Soft, tender to palpation in epigastrium, no guarding, hiccuping  EXTREMITIES:  1+ Peripheral Pulses, brisk capillary refill. long toenails  NERVOUS SYSTEM:  A&Ox3, no focal deficits   SKIN: No rashes or lesions      LABS:                          9.8    9.55  )-----------( 335      ( 2022 11:56 )             31.0     04-22    141  |  109<H>  |  25<H>  ----------------------------<  90  3.7   |  21<L>  |  2.58<H>    Ca    8.6      2022 11:56  Phos  2.6     04-22  Mg     1.90     -22    TPro  5.9<L>  /  Alb  3.0<L>  /  TBili  <0.2  /  DBili  x   /  AST  10  /  ALT  9   /  AlkPhos  82  04-22    LIVER FUNCTIONS - ( 2022 11:56 )  Alb: 3.0 g/dL / Pro: 5.9 g/dL / ALK PHOS: 82 U/L / ALT: 9 U/L / AST: 10 U/L / GGT: x                   Urinalysis Basic - ( 2022 19:47 )    Color: Yellow / Appearance: Clear / S.022 / pH: x  Gluc: x / Ketone: Negative  / Bili: Negative / Urobili: <2 mg/dL   Blood: x / Protein: 30 mg/dL / Nitrite: Negative   Leuk Esterase: Negative / RBC: 0 /HPF / WBC 2 /HPF   Sq Epi: x / Non Sq Epi: 0 /HPF / Bacteria: Few        TELEMETRY:     EKG:     IMAGING:       Authored by Neris Galaviz MD, PGY2  PATIENT:  IMELDA ROBERTSON  [id number]    CHIEF COMPLAINT:  Patient is a 61y old  Male who presents with a chief complaint of Esophageal stricture (2022 14:18)      INTERVAL HISTORY OVERNIGHT EVENTS: YOSSI overnight. Patient states pain is the same, some relief with oxycodone. Denies fevers, chills, cough, able to tolerate food. Amenable for d/c with outpatient f/u.           MEDICATIONS:  MEDICATIONS  (STANDING):  bisacodyl Suppository 10 milliGRAM(s) Rectal daily  folic acid 1 milliGRAM(s) Oral daily  gabapentin Solution 200 milliGRAM(s) Oral at bedtime  heparin   Injectable 5000 Unit(s) SubCutaneous every 8 hours  lactulose Syrup 20 Gram(s) Oral daily  multivitamin 1 Tablet(s) Oral daily  pancrelipase  (CREON  6,000 Lipase Units) 2 Capsule(s) Oral three times a day with meals  pantoprazole   Suspension 40 milliGRAM(s) Oral two times a day  polyethylene glycol 3350 17 Gram(s) Oral two times a day  senna 2 Tablet(s) Oral at bedtime  sucralfate 1 Gram(s) Oral every 6 hours  tamsulosin 0.4 milliGRAM(s) Oral at bedtime  thiamine 100 milliGRAM(s) Oral daily    MEDICATIONS  (PRN):  chlorproMAZINE    Tablet 10 milliGRAM(s) Oral every 8 hours PRN hiccuping  cyclobenzaprine 5 milliGRAM(s) Oral three times a day PRN hiccuping  HYDROmorphone  Injectable 0.5 milliGRAM(s) IV Push every 4 hours PRN Severe Pain (7 - 10)  melatonin 3 milliGRAM(s) Oral at bedtime PRN Insomnia  oxyCODONE    IR 10 milliGRAM(s) Oral every 4 hours PRN Severe Pain (7 - 10)      ALLERGIES:  Allergies    No Known Allergies    Intolerances        OBJECTIVE:  ICU Vital Signs Last 24 Hrs  T(C): 36.6 (2022 21:39), Max: 36.7 (2022 14:30)  T(F): 97.9 (2022 21:39), Max: 98.1 (2022 14:30)  HR: 88 (2022 21:39) (80 - 88)  BP: 135/77 (2022 21:39) (118/88 - 135/77)  BP(mean): --  ABP: --  ABP(mean): --  RR: 18 (2022 21:39) (18 - 18)  SpO2: 100% (2022 21:39) (98% - 100%)                PHYSICAL EXAMINATION:  GENERAL: thin man lying in bed  HEAD:  Atraumatic, Normocephalic  EYES: EOMI, PERRLA, conjunctiva and sclera clear  ENT: Moist mucous membranes  NECK: Supple, No JVD  CHEST/LUNG: Clear to auscultation bilaterally; No rales, rhonchi, wheezing, or rubs. Unlabored respirations  HEART: Regular rate and rhythm; No murmurs, rubs, or gallops  ABDOMEN: BSx4; Soft, tender to palpation in epigastrium, no guarding, hiccuping  EXTREMITIES:  1+ Peripheral Pulses, brisk capillary refill. long toenails  NERVOUS SYSTEM:  A&Ox3, no focal deficits   SKIN: No rashes or lesions      LABS:                          9.8    9.55  )-----------( 335      ( 2022 11:56 )             31.0     04-22    141  |  109<H>  |  25<H>  ----------------------------<  90  3.7   |  21<L>  |  2.58<H>    Ca    8.6      2022 11:56  Phos  2.6     04-22  Mg     1.90     04-22    TPro  5.9<L>  /  Alb  3.0<L>  /  TBili  <0.2  /  DBili  x   /  AST  10  /  ALT  9   /  AlkPhos  82  04-22    LIVER FUNCTIONS - ( 2022 11:56 )  Alb: 3.0 g/dL / Pro: 5.9 g/dL / ALK PHOS: 82 U/L / ALT: 9 U/L / AST: 10 U/L / GGT: x                   Urinalysis Basic - ( 2022 19:47 )    Color: Yellow / Appearance: Clear / S.022 / pH: x  Gluc: x / Ketone: Negative  / Bili: Negative / Urobili: <2 mg/dL   Blood: x / Protein: 30 mg/dL / Nitrite: Negative   Leuk Esterase: Negative / RBC: 0 /HPF / WBC 2 /HPF   Sq Epi: x / Non Sq Epi: 0 /HPF / Bacteria: Few        TELEMETRY:     EKG:     IMAGING:       Authored by Neris Galaviz MD, PGY2  PATIENT:  IMELDA ROBERTSON  [id number]    CHIEF COMPLAINT:  Patient is a 61y old  Male who presents with a chief complaint of Esophageal stricture (2022 14:18)      INTERVAL HISTORY OVERNIGHT EVENTS: YOSSI overnight. Patient states pain is the same, some relief with oxycodone. Denies fevers, chills, cough, able to tolerate food. Amenable for d/c with outpatient f/u.           MEDICATIONS:  MEDICATIONS  (STANDING):  bisacodyl Suppository 10 milliGRAM(s) Rectal daily  folic acid 1 milliGRAM(s) Oral daily  gabapentin Solution 200 milliGRAM(s) Oral at bedtime  heparin   Injectable 5000 Unit(s) SubCutaneous every 8 hours  lactulose Syrup 20 Gram(s) Oral daily  multivitamin 1 Tablet(s) Oral daily  pancrelipase  (CREON  6,000 Lipase Units) 2 Capsule(s) Oral three times a day with meals  pantoprazole   Suspension 40 milliGRAM(s) Oral two times a day  polyethylene glycol 3350 17 Gram(s) Oral two times a day  senna 2 Tablet(s) Oral at bedtime  sucralfate 1 Gram(s) Oral every 6 hours  tamsulosin 0.4 milliGRAM(s) Oral at bedtime  thiamine 100 milliGRAM(s) Oral daily    MEDICATIONS  (PRN):  chlorproMAZINE    Tablet 10 milliGRAM(s) Oral every 8 hours PRN hiccuping  cyclobenzaprine 5 milliGRAM(s) Oral three times a day PRN hiccuping  HYDROmorphone  Injectable 0.5 milliGRAM(s) IV Push every 4 hours PRN Severe Pain (7 - 10)  melatonin 3 milliGRAM(s) Oral at bedtime PRN Insomnia  oxyCODONE    IR 10 milliGRAM(s) Oral every 4 hours PRN Severe Pain (7 - 10)      ALLERGIES:  Allergies    No Known Allergies    Intolerances        OBJECTIVE:  ICU Vital Signs Last 24 Hrs  T(C): 36.6 (2022 21:39), Max: 36.7 (2022 14:30)  T(F): 97.9 (2022 21:39), Max: 98.1 (2022 14:30)  HR: 88 (2022 21:39) (80 - 88)  BP: 135/77 (2022 21:39) (118/88 - 135/77)  BP(mean): --  ABP: --  ABP(mean): --  RR: 18 (2022 21:39) (18 - 18)  SpO2: 100% (2022 21:39) (98% - 100%)                PHYSICAL EXAMINATION:  GENERAL: thin man lying in bed  HEAD:  Atraumatic, Normocephalic  EYES: EOMI, PERRLA, conjunctiva and sclera clear  ENT: Moist mucous membranes  NECK: Supple, No JVD  CHEST/LUNG: Clear to auscultation bilaterally; No rales, rhonchi, wheezing, or rubs. Unlabored respirations  HEART: Regular rate and rhythm; No murmurs, rubs, or gallops  ABDOMEN: BSx4; Soft, tender to palpation in epigastrium, no guarding, hiccuping  EXTREMITIES:  1+ Peripheral Pulses, brisk capillary refill. long toenails  NERVOUS SYSTEM:  A&Ox3, no focal deficits   SKIN: No rashes or lesions      LABS:                 9.8    9.55  )-----------( 335      ( 2022 11:56 )             31.0     04-22    141  |  109<H>  |  25<H>  ----------------------------<  90  3.7   |  21<L>  |  2.58<H>    Ca    8.6      2022 11:56  Phos  2.6     04-22  Mg     1.90     04-22    TPro  5.9<L>  /  Alb  3.0<L>  /  TBili  <0.2  /  DBili  x   /  AST  10  /  ALT  9   /  AlkPhos  82  04-22    LIVER FUNCTIONS - ( 2022 11:56 )  Alb: 3.0 g/dL / Pro: 5.9 g/dL / ALK PHOS: 82 U/L / ALT: 9 U/L / AST: 10 U/L / GGT: x             Urinalysis Basic - ( 2022 19:47 )    Color: Yellow / Appearance: Clear / S.022 / pH: x  Gluc: x / Ketone: Negative  / Bili: Negative / Urobili: <2 mg/dL   Blood: x / Protein: 30 mg/dL / Nitrite: Negative   Leuk Esterase: Negative / RBC: 0 /HPF / WBC 2 /HPF   Sq Epi: x / Non Sq Epi: 0 /HPF / Bacteria: Few        TELEMETRY:     EKG:     IMAGING:

## 2022-04-23 NOTE — PROVIDER CONTACT NOTE (OTHER) - BACKGROUND
Pt came in for digestive disorder
Pt admitted with abdominal pain
Multiple attempts to contact team 8 regarding pt's medication. pt unable to [facility] meds at [facility] at this time but will have brother come monday to  medications
Pt admitted for pancreatitis

## 2022-04-23 NOTE — PROGRESS NOTE ADULT - PROBLEM SELECTOR PLAN 9
DVT ppx: Started heparin subQ  Diet: pureed, increase as tolerated   Dispo: home with home PT DVT ppx: Started heparin subQ  Diet: regular diet  Dispo: home with home PT

## 2022-04-25 ENCOUNTER — NON-APPOINTMENT (OUTPATIENT)
Age: 62
End: 2022-04-25

## 2022-04-26 ENCOUNTER — APPOINTMENT (OUTPATIENT)
Dept: GASTROENTEROLOGY | Facility: CLINIC | Age: 62
End: 2022-04-26

## 2022-04-26 ENCOUNTER — INPATIENT (INPATIENT)
Facility: HOSPITAL | Age: 62
LOS: 1 days | Discharge: ANOTHER HOSPITAL | End: 2022-04-28
Attending: INTERNAL MEDICINE | Admitting: INTERNAL MEDICINE
Payer: COMMERCIAL

## 2022-04-26 ENCOUNTER — NON-APPOINTMENT (OUTPATIENT)
Age: 62
End: 2022-04-26

## 2022-04-26 VITALS
HEIGHT: 74 IN | OXYGEN SATURATION: 100 % | SYSTOLIC BLOOD PRESSURE: 100 MMHG | DIASTOLIC BLOOD PRESSURE: 72 MMHG | HEART RATE: 82 BPM | TEMPERATURE: 98 F | RESPIRATION RATE: 20 BRPM

## 2022-04-26 DIAGNOSIS — K25.5 CHRONIC OR UNSPECIFIED GASTRIC ULCER WITH PERFORATION: Chronic | ICD-10-CM

## 2022-04-26 LAB
ALBUMIN SERPL ELPH-MCNC: 2.4 G/DL — LOW (ref 3.3–5)
ALP SERPL-CCNC: 77 U/L — SIGNIFICANT CHANGE UP (ref 40–120)
ALT FLD-CCNC: 13 U/L — SIGNIFICANT CHANGE UP (ref 12–78)
ANION GAP SERPL CALC-SCNC: 8 MMOL/L — SIGNIFICANT CHANGE UP (ref 5–17)
APTT BLD: 32.7 SEC — SIGNIFICANT CHANGE UP (ref 27.5–35.5)
AST SERPL-CCNC: 5 U/L — LOW (ref 15–37)
BASE EXCESS BLDV CALC-SCNC: -3.5 MMOL/L — LOW (ref -2–3)
BASOPHILS # BLD AUTO: 0.05 K/UL — SIGNIFICANT CHANGE UP (ref 0–0.2)
BASOPHILS NFR BLD AUTO: 0.4 % — SIGNIFICANT CHANGE UP (ref 0–2)
BILIRUB DIRECT SERPL-MCNC: 0.1 MG/DL — SIGNIFICANT CHANGE UP (ref 0–0.3)
BILIRUB INDIRECT FLD-MCNC: 0 MG/DL — LOW (ref 0.2–1)
BILIRUB SERPL-MCNC: 0.1 MG/DL — LOW (ref 0.2–1.2)
BLD GP AB SCN SERPL QL: SIGNIFICANT CHANGE UP
BLOOD GAS COMMENTS, VENOUS: SIGNIFICANT CHANGE UP
BUN SERPL-MCNC: 63 MG/DL — HIGH (ref 7–23)
CALCIUM SERPL-MCNC: 8.4 MG/DL — LOW (ref 8.5–10.1)
CHLORIDE BLDV-SCNC: 109 MMOL/L — HIGH (ref 98–107)
CHLORIDE SERPL-SCNC: 109 MMOL/L — HIGH (ref 96–108)
CO2 BLDV-SCNC: 26 MMOL/L — SIGNIFICANT CHANGE UP (ref 22–26)
CO2 SERPL-SCNC: 26 MMOL/L — SIGNIFICANT CHANGE UP (ref 22–31)
CREAT SERPL-MCNC: 3.39 MG/DL — HIGH (ref 0.5–1.3)
CULTURE RESULTS: SIGNIFICANT CHANGE UP
CULTURE RESULTS: SIGNIFICANT CHANGE UP
EGFR: 20 ML/MIN/1.73M2 — LOW
EOSINOPHIL # BLD AUTO: 0 K/UL — SIGNIFICANT CHANGE UP (ref 0–0.5)
EOSINOPHIL NFR BLD AUTO: 0 % — SIGNIFICANT CHANGE UP (ref 0–6)
FLUAV AG NPH QL: SIGNIFICANT CHANGE UP
FLUBV AG NPH QL: SIGNIFICANT CHANGE UP
GAS PNL BLDV: 140 MMOL/L — SIGNIFICANT CHANGE UP (ref 136–145)
GAS PNL BLDV: SIGNIFICANT CHANGE UP
GAS PNL BLDV: SIGNIFICANT CHANGE UP
GLUCOSE BLDV-MCNC: 158 MG/DL — HIGH (ref 65–95)
GLUCOSE SERPL-MCNC: 133 MG/DL — HIGH (ref 70–99)
HCO3 BLDV-SCNC: 24 MMOL/L — SIGNIFICANT CHANGE UP (ref 22–28)
HCT VFR BLD CALC: 28 % — LOW (ref 39–50)
HCT VFR BLDA CALC: 31 % — LOW (ref 37–47)
HGB BLD CALC-MCNC: 10.3 G/DL — LOW (ref 12.6–17.4)
HGB BLD-MCNC: 8.9 G/DL — LOW (ref 13–17)
HOROWITZ INDEX BLDV+IHG-RTO: 0.21 — SIGNIFICANT CHANGE UP
IMM GRANULOCYTES NFR BLD AUTO: 0.5 % — SIGNIFICANT CHANGE UP (ref 0–1.5)
INR BLD: 1.1 RATIO — SIGNIFICANT CHANGE UP (ref 0.88–1.16)
LACTATE BLDV-MCNC: 1 MMOL/L — SIGNIFICANT CHANGE UP (ref 0.56–1.39)
LACTATE SERPL-SCNC: 0.6 MMOL/L — LOW (ref 0.7–2)
LIDOCAIN IGE QN: 51 U/L — LOW (ref 73–393)
LYMPHOCYTES # BLD AUTO: 1.27 K/UL — SIGNIFICANT CHANGE UP (ref 1–3.3)
LYMPHOCYTES # BLD AUTO: 9.8 % — LOW (ref 13–44)
MCHC RBC-ENTMCNC: 28.8 PG — SIGNIFICANT CHANGE UP (ref 27–34)
MCHC RBC-ENTMCNC: 31.8 G/DL — LOW (ref 32–36)
MCV RBC AUTO: 90.6 FL — SIGNIFICANT CHANGE UP (ref 80–100)
MONOCYTES # BLD AUTO: 0.46 K/UL — SIGNIFICANT CHANGE UP (ref 0–0.9)
MONOCYTES NFR BLD AUTO: 3.5 % — SIGNIFICANT CHANGE UP (ref 2–14)
NEUTROPHILS # BLD AUTO: 11.11 K/UL — HIGH (ref 1.8–7.4)
NEUTROPHILS NFR BLD AUTO: 85.8 % — HIGH (ref 43–77)
NRBC # BLD: 0 /100 WBCS — SIGNIFICANT CHANGE UP (ref 0–0)
PCO2 BLDV: 55 MMHG — SIGNIFICANT CHANGE UP (ref 42–55)
PH BLDV: 7.25 — LOW (ref 7.32–7.43)
PLATELET # BLD AUTO: 467 K/UL — HIGH (ref 150–400)
PO2 BLDV: 15 MMHG — LOW (ref 25–45)
POTASSIUM BLDV-SCNC: 3.9 MMOL/L — SIGNIFICANT CHANGE UP (ref 3.5–5.1)
POTASSIUM SERPL-MCNC: 3.3 MMOL/L — LOW (ref 3.5–5.3)
POTASSIUM SERPL-SCNC: 3.3 MMOL/L — LOW (ref 3.5–5.3)
PROT SERPL-MCNC: 6 GM/DL — SIGNIFICANT CHANGE UP (ref 6–8.3)
PROTHROM AB SERPL-ACNC: 13.1 SEC — SIGNIFICANT CHANGE UP (ref 10.5–13.4)
RBC # BLD: 3.09 M/UL — LOW (ref 4.2–5.8)
RBC # FLD: 17.1 % — HIGH (ref 10.3–14.5)
SAO2 % BLDV: 19.8 % — LOW (ref 94–98)
SARS-COV-2 RNA SPEC QL NAA+PROBE: SIGNIFICANT CHANGE UP
SODIUM SERPL-SCNC: 143 MMOL/L — SIGNIFICANT CHANGE UP (ref 135–145)
SPECIMEN SOURCE: SIGNIFICANT CHANGE UP
SPECIMEN SOURCE: SIGNIFICANT CHANGE UP
SURGICAL PATHOLOGY STUDY: SIGNIFICANT CHANGE UP
WBC # BLD: 12.96 K/UL — HIGH (ref 3.8–10.5)
WBC # FLD AUTO: 12.96 K/UL — HIGH (ref 3.8–10.5)

## 2022-04-26 PROCEDURE — 99233 SBSQ HOSP IP/OBS HIGH 50: CPT

## 2022-04-26 PROCEDURE — 99223 1ST HOSP IP/OBS HIGH 75: CPT

## 2022-04-26 PROCEDURE — 93010 ELECTROCARDIOGRAM REPORT: CPT

## 2022-04-26 PROCEDURE — 99285 EMERGENCY DEPT VISIT HI MDM: CPT

## 2022-04-26 PROCEDURE — 99255 IP/OBS CONSLTJ NEW/EST HI 80: CPT

## 2022-04-26 PROCEDURE — 71045 X-RAY EXAM CHEST 1 VIEW: CPT | Mod: 26

## 2022-04-26 PROCEDURE — 74177 CT ABD & PELVIS W/CONTRAST: CPT | Mod: 26,MA

## 2022-04-26 RX ORDER — PANTOPRAZOLE SODIUM 20 MG/1
8 TABLET, DELAYED RELEASE ORAL
Qty: 80 | Refills: 0 | Status: DISCONTINUED | OUTPATIENT
Start: 2022-04-26 | End: 2022-04-26

## 2022-04-26 RX ORDER — SODIUM CHLORIDE 9 MG/ML
1000 INJECTION INTRAMUSCULAR; INTRAVENOUS; SUBCUTANEOUS ONCE
Refills: 0 | Status: COMPLETED | OUTPATIENT
Start: 2022-04-26 | End: 2022-04-26

## 2022-04-26 RX ORDER — OCTREOTIDE ACETATE 200 UG/ML
50 INJECTION, SOLUTION INTRAVENOUS; SUBCUTANEOUS ONCE
Refills: 0 | Status: COMPLETED | OUTPATIENT
Start: 2022-04-26 | End: 2022-04-26

## 2022-04-26 RX ORDER — ACETAMINOPHEN 500 MG
650 TABLET ORAL EVERY 6 HOURS
Refills: 0 | Status: DISCONTINUED | OUTPATIENT
Start: 2022-04-26 | End: 2022-04-28

## 2022-04-26 RX ORDER — MORPHINE SULFATE 50 MG/1
4 CAPSULE, EXTENDED RELEASE ORAL ONCE
Refills: 0 | Status: DISCONTINUED | OUTPATIENT
Start: 2022-04-26 | End: 2022-04-26

## 2022-04-26 RX ORDER — CEFTRIAXONE 500 MG/1
1000 INJECTION, POWDER, FOR SOLUTION INTRAMUSCULAR; INTRAVENOUS ONCE
Refills: 0 | Status: COMPLETED | OUTPATIENT
Start: 2022-04-26 | End: 2022-04-26

## 2022-04-26 RX ORDER — HYDROMORPHONE HYDROCHLORIDE 2 MG/ML
0.5 INJECTION INTRAMUSCULAR; INTRAVENOUS; SUBCUTANEOUS EVERY 4 HOURS
Refills: 0 | Status: DISCONTINUED | OUTPATIENT
Start: 2022-04-26 | End: 2022-04-26

## 2022-04-26 RX ORDER — CYCLOBENZAPRINE HYDROCHLORIDE 10 MG/1
5 TABLET, FILM COATED ORAL THREE TIMES A DAY
Refills: 0 | Status: DISCONTINUED | OUTPATIENT
Start: 2022-04-26 | End: 2022-04-28

## 2022-04-26 RX ORDER — ONDANSETRON 8 MG/1
4 TABLET, FILM COATED ORAL ONCE
Refills: 0 | Status: COMPLETED | OUTPATIENT
Start: 2022-04-26 | End: 2022-04-26

## 2022-04-26 RX ORDER — METOCLOPRAMIDE HCL 10 MG
10 TABLET ORAL ONCE
Refills: 0 | Status: DISCONTINUED | OUTPATIENT
Start: 2022-04-26 | End: 2022-04-26

## 2022-04-26 RX ORDER — SENNA PLUS 8.6 MG/1
2 TABLET ORAL AT BEDTIME
Refills: 0 | Status: DISCONTINUED | OUTPATIENT
Start: 2022-04-26 | End: 2022-04-28

## 2022-04-26 RX ORDER — POLYETHYLENE GLYCOL 3350 17 G/17G
17 POWDER, FOR SOLUTION ORAL
Refills: 0 | Status: DISCONTINUED | OUTPATIENT
Start: 2022-04-26 | End: 2022-04-28

## 2022-04-26 RX ORDER — MORPHINE SULFATE 50 MG/1
6 CAPSULE, EXTENDED RELEASE ORAL ONCE
Refills: 0 | Status: DISCONTINUED | OUTPATIENT
Start: 2022-04-26 | End: 2022-04-26

## 2022-04-26 RX ORDER — MORPHINE SULFATE 50 MG/1
2 CAPSULE, EXTENDED RELEASE ORAL EVERY 4 HOURS
Refills: 0 | Status: DISCONTINUED | OUTPATIENT
Start: 2022-04-26 | End: 2022-04-26

## 2022-04-26 RX ORDER — HYDROMORPHONE HYDROCHLORIDE 2 MG/ML
0.5 INJECTION INTRAMUSCULAR; INTRAVENOUS; SUBCUTANEOUS EVERY 4 HOURS
Refills: 0 | Status: DISCONTINUED | OUTPATIENT
Start: 2022-04-26 | End: 2022-04-28

## 2022-04-26 RX ORDER — LIPASE/PROTEASE/AMYLASE 16-48-48K
2 CAPSULE,DELAYED RELEASE (ENTERIC COATED) ORAL
Refills: 0 | Status: DISCONTINUED | OUTPATIENT
Start: 2022-04-26 | End: 2022-04-28

## 2022-04-26 RX ORDER — OCTREOTIDE ACETATE 200 UG/ML
50 INJECTION, SOLUTION INTRAVENOUS; SUBCUTANEOUS
Qty: 500 | Refills: 0 | Status: DISCONTINUED | OUTPATIENT
Start: 2022-04-26 | End: 2022-04-26

## 2022-04-26 RX ORDER — OXYCODONE HYDROCHLORIDE 5 MG/1
10 TABLET ORAL EVERY 4 HOURS
Refills: 0 | Status: DISCONTINUED | OUTPATIENT
Start: 2022-04-26 | End: 2022-04-28

## 2022-04-26 RX ORDER — SODIUM CHLORIDE 9 MG/ML
1000 INJECTION INTRAMUSCULAR; INTRAVENOUS; SUBCUTANEOUS
Refills: 0 | Status: DISCONTINUED | OUTPATIENT
Start: 2022-04-26 | End: 2022-04-28

## 2022-04-26 RX ORDER — CHLORPROMAZINE HCL 10 MG
25 TABLET ORAL EVERY 8 HOURS
Refills: 0 | Status: DISCONTINUED | OUTPATIENT
Start: 2022-04-26 | End: 2022-04-28

## 2022-04-26 RX ORDER — PANTOPRAZOLE SODIUM 20 MG/1
40 TABLET, DELAYED RELEASE ORAL EVERY 12 HOURS
Refills: 0 | Status: DISCONTINUED | OUTPATIENT
Start: 2022-04-26 | End: 2022-04-28

## 2022-04-26 RX ORDER — GABAPENTIN 400 MG/1
200 CAPSULE ORAL AT BEDTIME
Refills: 0 | Status: DISCONTINUED | OUTPATIENT
Start: 2022-04-26 | End: 2022-04-28

## 2022-04-26 RX ORDER — LACTULOSE 10 G/15ML
20 SOLUTION ORAL DAILY
Refills: 0 | Status: DISCONTINUED | OUTPATIENT
Start: 2022-04-26 | End: 2022-04-28

## 2022-04-26 RX ORDER — PANTOPRAZOLE SODIUM 20 MG/1
80 TABLET, DELAYED RELEASE ORAL ONCE
Refills: 0 | Status: COMPLETED | OUTPATIENT
Start: 2022-04-26 | End: 2022-04-26

## 2022-04-26 RX ORDER — SUCRALFATE 1 G
1 TABLET ORAL EVERY 12 HOURS
Refills: 0 | Status: DISCONTINUED | OUTPATIENT
Start: 2022-04-26 | End: 2022-04-28

## 2022-04-26 RX ORDER — TAMSULOSIN HYDROCHLORIDE 0.4 MG/1
0.4 CAPSULE ORAL AT BEDTIME
Refills: 0 | Status: DISCONTINUED | OUTPATIENT
Start: 2022-04-26 | End: 2022-04-28

## 2022-04-26 RX ADMIN — SODIUM CHLORIDE 1000 MILLILITER(S): 9 INJECTION INTRAMUSCULAR; INTRAVENOUS; SUBCUTANEOUS at 15:17

## 2022-04-26 RX ADMIN — MORPHINE SULFATE 4 MILLIGRAM(S): 50 CAPSULE, EXTENDED RELEASE ORAL at 17:12

## 2022-04-26 RX ADMIN — OCTREOTIDE ACETATE 50 MICROGRAM(S): 200 INJECTION, SOLUTION INTRAVENOUS; SUBCUTANEOUS at 15:08

## 2022-04-26 RX ADMIN — PANTOPRAZOLE SODIUM 10 MG/HR: 20 TABLET, DELAYED RELEASE ORAL at 15:09

## 2022-04-26 RX ADMIN — CEFTRIAXONE 100 MILLIGRAM(S): 500 INJECTION, POWDER, FOR SOLUTION INTRAMUSCULAR; INTRAVENOUS at 14:17

## 2022-04-26 RX ADMIN — TAMSULOSIN HYDROCHLORIDE 0.4 MILLIGRAM(S): 0.4 CAPSULE ORAL at 21:34

## 2022-04-26 RX ADMIN — Medication 25 MILLIGRAM(S): at 22:26

## 2022-04-26 RX ADMIN — CEFTRIAXONE 1000 MILLIGRAM(S): 500 INJECTION, POWDER, FOR SOLUTION INTRAMUSCULAR; INTRAVENOUS at 15:17

## 2022-04-26 RX ADMIN — MORPHINE SULFATE 2 MILLIGRAM(S): 50 CAPSULE, EXTENDED RELEASE ORAL at 22:30

## 2022-04-26 RX ADMIN — SENNA PLUS 2 TABLET(S): 8.6 TABLET ORAL at 21:34

## 2022-04-26 RX ADMIN — PANTOPRAZOLE SODIUM 8 MG/HR: 20 TABLET, DELAYED RELEASE ORAL at 16:55

## 2022-04-26 RX ADMIN — OCTREOTIDE ACETATE 10 MICROGRAM(S)/HR: 200 INJECTION, SOLUTION INTRAVENOUS; SUBCUTANEOUS at 15:08

## 2022-04-26 RX ADMIN — PANTOPRAZOLE SODIUM 80 MILLIGRAM(S): 20 TABLET, DELAYED RELEASE ORAL at 14:16

## 2022-04-26 RX ADMIN — MORPHINE SULFATE 2 MILLIGRAM(S): 50 CAPSULE, EXTENDED RELEASE ORAL at 21:34

## 2022-04-26 RX ADMIN — MORPHINE SULFATE 6 MILLIGRAM(S): 50 CAPSULE, EXTENDED RELEASE ORAL at 15:17

## 2022-04-26 RX ADMIN — LACTULOSE 20 GRAM(S): 10 SOLUTION ORAL at 21:36

## 2022-04-26 RX ADMIN — ONDANSETRON 4 MILLIGRAM(S): 8 TABLET, FILM COATED ORAL at 17:10

## 2022-04-26 RX ADMIN — ONDANSETRON 4 MILLIGRAM(S): 8 TABLET, FILM COATED ORAL at 14:16

## 2022-04-26 RX ADMIN — POLYETHYLENE GLYCOL 3350 17 GRAM(S): 17 POWDER, FOR SOLUTION ORAL at 21:36

## 2022-04-26 RX ADMIN — MORPHINE SULFATE 6 MILLIGRAM(S): 50 CAPSULE, EXTENDED RELEASE ORAL at 14:21

## 2022-04-26 RX ADMIN — SODIUM CHLORIDE 1000 MILLILITER(S): 9 INJECTION INTRAMUSCULAR; INTRAVENOUS; SUBCUTANEOUS at 14:22

## 2022-04-26 RX ADMIN — OCTREOTIDE ACETATE 50 MICROGRAM(S)/HR: 200 INJECTION, SOLUTION INTRAVENOUS; SUBCUTANEOUS at 16:55

## 2022-04-26 RX ADMIN — MORPHINE SULFATE 4 MILLIGRAM(S): 50 CAPSULE, EXTENDED RELEASE ORAL at 17:10

## 2022-04-26 RX ADMIN — SODIUM CHLORIDE 80 MILLILITER(S): 9 INJECTION INTRAMUSCULAR; INTRAVENOUS; SUBCUTANEOUS at 20:31

## 2022-04-26 NOTE — ED PROVIDER NOTE - ATTENDING APP SHARED VISIT CONTRIBUTION OF CARE
62 yo M with hx of remote ETOH use, chronic pancreatitis, esophagitis, HCV, CKD4, and esophogeal stent placed on 4/18 pw abdominal paina nd bloody/dark vomit starting this AM. no melena or rectal bleeding; patinet has been constipated and has not had a BM in3 days. VS wihtin appropriate limits. uncomfortable appearing, abdomen diffusely tender without guarding. H/H with out transfusion indicated, VS appropriate throghout ED stay; treated for possible variceal bleed in consideration of history of etoh abuse, recent EGD and now CT with no evidence of varices; d/w icu, rejected patient as not a candidate;d/w GI, will admit for furhtermanagement of hematemesis

## 2022-04-26 NOTE — ED PROVIDER NOTE - PROGRESS NOTE DETAILS
GI Dr Edwards aware and will see pt on admission Case discussed with ICU pt cleared for admission to the floor since hemodynamically stable

## 2022-04-26 NOTE — H&P ADULT - HISTORY OF PRESENT ILLNESS
60 yo M with hx of remote ETOH use, chronic pancreatitis, esophagitis, HCV, CKD4, and esophogeal stent placed on 4/18 pw acute onset of multiple episodes of estella hematemesis pta to the ED with no stooling or dark stools for 3 d in duration. Associated with moderately severe epigastric abd pain mod in severity non radiating ongoing for 3 d in duration. No cp, sob, loc, or near syncope.    Mural thickening of the distal esophagus and gastroesophageal junction   with interval placement of an esophageal/GE junction stent.     Coarse calcifications are again seen throughout the pancreas, suggestive   of chronic pancreatitis. Stable dilatation of the main pancreatic duct in   the body and tail may be due to a possible 1.6 cm obstructing stone in   the main pancreatic duct.    -patient's MRI notable for severe dilation of the main pancreatic duct with innumerable dilated sidebranches, with caliber change at the neck/body junction. Favor sequela of chronic pancreatitis with obstructing intraductal calculi.  -advanced GI aware, deferring intervention to remove possible stones in 3-4 weeks as stricture too severe to pass scope for stone retrieval, as per GI.    Patient stabilized on regimen of tylenol prn, flexeril 5mg tid as needed, gabapentin 200mg at bedtime, oxycodone 10mg every 4 hours for severe pain. Patient to follow with chronic pain specialist outpatient.    Patient is poor historian. He is currently demanding IV Morphine.    61M with PMHx of alcohol abuse, chronic pancreatitis, chronic esophagitis, and CKD4 presenting for hematemesis. Patient recently admitted to [facility] and found to have esophageal stricture with stent placement. EGD showing mural thickening of distal esophagus. Imaging showing coarse calcifications throughout pancreas and possible obstructing stone in pancreatic duct. MRI abdomen confirmed this, but advanced GI deferred intervention given stricture & inability to pass instrument down for ERCP. Patient otherwise controlled with opioids on that admission and discharged and told to follow up outpatient with pain specialist. In the ED patient given CTX, 2 L NS and GI was consulted.

## 2022-04-26 NOTE — ED PROVIDER NOTE - CLINICAL SUMMARY MEDICAL DECISION MAKING FREE TEXT BOX
60 yo m pw hematemesis x5 with 3 episodes in the ED -- pt is hemodynamically stable in the ED with Hgb of 8.1 and ctap negative for perforation or obstruction.

## 2022-04-26 NOTE — CONSULT NOTE ADULT - SUBJECTIVE AND OBJECTIVE BOX
Patient chart reviewed, full consult to follow.     Post esophageal stent at [facility]  Upper GI bleed; r/o stent migration/ esophageal source  No absolute renal contraindication to CT with IV contrast as vital in diagnosis  Will follow    Thank you for the courtesy of this consultation. [facility], [facility]  NEPHROLOGY AND HYPERTENSION  [address]  [address]  [place] [address]  [phone]    MD HALIE OLIVIA MD ANDREY GONCHARUK, MD MADHU KORRAPATI, MD YELENA ROSENBERG, MD BINNY KOSHY, MD CHRISTOPHER CAPUTO, MD EDWARD BOVER, MD      Information from chart:  "Patient is a 61y old  Male who presents with a chief complaint of Hematemesis (26 Apr 2022 18:42)    HPI:  Patient is poor historian. He is currently demanding IV Morphine.    61M with PMHx of alcohol abuse, chronic pancreatitis, chronic esophagitis, and CKD4 presenting for hematemesis. Patient recently admitted to [facility] and found to have esophageal stricture with stent placement. EGD showing mural thickening of distal esophagus. Imaging showing coarse calcifications throughout pancreas and possible obstructing stone in pancreatic duct. MRI abdomen confirmed this, but advanced GI deferred intervention given stricture & inability to pass instrument down for ERCP. Patient otherwise controlled with opioids on that admission and discharged and told to follow up outpatient with pain specialist. In the ED patient given CTX, 2 L NS and GI was consulted.     (26 Apr 2022 18:42)   "    Patient with hematemesis   Post esophageal stenting  CKD 4 stable       PAST MEDICAL & SURGICAL HISTORY:  ETOH abuse    Pancreatitis    Hepatitis C  treated    Gout    Gastric perforation      FAMILY HISTORY:  FH: HTN (hypertension)      Allergies    No Known Allergies    Intolerances      Home Medications:    MEDICATIONS  (STANDING):  bisacodyl Suppository 10 milliGRAM(s) Rectal daily  gabapentin Solution 200 milliGRAM(s) Oral at bedtime  lactulose Syrup 20 Gram(s) Oral daily  pancrelipase  (CREON  6,000 Lipase Units) 2 Capsule(s) Oral three times a day with meals  pantoprazole  Injectable 40 milliGRAM(s) IV Push every 12 hours  polyethylene glycol 3350 17 Gram(s) Oral two times a day  senna 2 Tablet(s) Oral at bedtime  sodium chloride 0.9%. 1000 milliLiter(s) (80 mL/Hr) IV Continuous <Continuous>  sucralfate suspension 1 Gram(s) Oral every 12 hours  tamsulosin 0.4 milliGRAM(s) Oral at bedtime    MEDICATIONS  (PRN):  acetaminophen    Suspension .. 650 milliGRAM(s) Oral every 6 hours PRN Temp greater or equal to 38C (100.4F), Mild Pain (1 - 3)  chlorproMAZINE    Tablet 25 milliGRAM(s) Oral every 8 hours PRN hiccuping  cyclobenzaprine 5 milliGRAM(s) Oral three times a day PRN Muscle Spasm  HYDROmorphone  Injectable 0.5 milliGRAM(s) IV Push every 4 hours PRN Severe Pain (7 - 10)  ondansetron Injectable 4 milliGRAM(s) IV Push every 6 hours PRN Nausea and/or Vomiting  oxyCODONE    IR 10 milliGRAM(s) Oral every 4 hours PRN Moderate Pain (4 - 6)    Vital Signs Last 24 Hrs  T(C): 36.9 (26 Apr 2022 13:10), Max: 36.9 (26 Apr 2022 13:10)  T(F): 98.4 (26 Apr 2022 13:10), Max: 98.4 (26 Apr 2022 13:10)  HR: 89 (27 Apr 2022 00:01) (82 - 89)  BP: 140/76 (27 Apr 2022 00:01) (100/72 - 140/76)  BP(mean): --  RR: 20 (26 Apr 2022 22:45) (16 - 20)  SpO2: 98% (26 Apr 2022 22:45) (97% - 100%)    Daily Height in cm: 187.96 (26 Apr 2022 13:10)    Daily     CAPILLARY BLOOD GLUCOSE        PHYSICAL EXAM:      T(C): 36.9 (04-26-22 @ 13:10), Max: 36.9 (04-26-22 @ 13:10)  HR: 89 (04-27-22 @ 00:01) (82 - 89)  BP: 140/76 (04-27-22 @ 00:01) (100/72 - 140/76)  RR: 20 (04-26-22 @ 22:45) (16 - 20)  SpO2: 98% (04-26-22 @ 22:45) (97% - 100%)  Wt(kg): --  Lungs clear  Heart S1S2  Abd soft NT ND  Extremities:   tr edema              04-26    143  |  109<H>  |  63<H>  ----------------------------<  133<H>  3.3<L>   |  26  |  3.39<H>    Ca    8.4<L>      26 Apr 2022 15:25    TPro  6.0  /  Alb  2.4<L>  /  TBili  0.1<L>  /  DBili  0.1  /  AST  5<L>  /  ALT  13  /  AlkPhos  77  04-26                          8.7    14.35 )-----------( 414      ( 27 Apr 2022 00:06 )             27.0     Creatinine Trend: 3.39<--, 2.58<--, 2.60<--, 2.58<--, 2.72<--, 2.92<--          Assessment   YUNIEL CKD 4; pre renal azotemia     Plan  IVF until po intake adequate  GI evaluation   No absolute renal contraindication to CT with IV contrast as vital in diagnosis    Austin Dukes MD        Patient chart reviewed, full consult to follow.

## 2022-04-26 NOTE — CONSULT NOTE ADULT - SUBJECTIVE AND OBJECTIVE BOX
full consult dictated    Plan: 62 yo M with h/o ETOH use, chronic pancreatitis, esophagitis, HCV, CKD; s/p esophogeal stent placement on 4/18for esophageal stricture (benign) - admitted with multiple episodes of hematemesis and dark stools x 3 days. Pt also c/o  epigastric  pain x 3 days. No cp, sob, loc, or near syncope.    CT shows Mural thickening of the distal esophagus and gastroesophageal junction with interval placement of an esophageal/GE junction stent.   Coarse calcifications are again seen throughout the pancreas, suggestive of chronic pancreatitis. Stable dilatation of the main pancreatic duct in   the body and tail may be due to a possible 1.6 cm obstructing stone in the main pancreatic duct.    Pt will be started on IV protonix and carafate. Clear liquids; follow CBC. Considerrepeat EGD although unsure if scope will be able to get through stent.  Will attempt to contact GI at [facility]

## 2022-04-26 NOTE — CONSULT NOTE ADULT - CRITICAL CARE ATTENDING COMMENT
61M w/ remote EtOH use disorder, chronic pancreatitis, esophagitis, HCV, CKD4, esophageal structure s/p stent placement on 4/19 at [facility]. Presents w/ hematemesis x3 days associated w/ abdominal pain. In ED, VS stable w/out any HD instability. ED PA reports pt continues to have small amounts of hematemesis, ~10-15 cc/hr while in the ED. Labs show hgb 8.9, last hgb upon d/c from [facility] 9.8. CTA A/P shows mural thickening of distal esophagus and GE junction w/ stent in place. ICU consulted for ongoing hematemesis. Pt awake and alert.     - hgb stable, would continue to monitor closely  - VS stable, no hypotension  - GI following  - would strongly consider transfer to [facility] where he had procedure done with advanced GI  - at this time, pt does not require ICU level of care; please call back with any further questions or if clinical status chamges

## 2022-04-26 NOTE — CONSULT NOTE ADULT - SUBJECTIVE AND OBJECTIVE BOX
62 YO male with PMH remote ETOH use, chronic pancreatitis, esophagitis, HCV, CKD4, and esophogeal stricture s/p stent placed on 4/19, presents with hematemesis x 3 days.  In the ED, H/H noted to be 8.9/28, lactate 0.6.  Hemodynamically stable in ED.    ICU consulted for hematemesis.          Allergies    No Known Allergies    Intolerances        MEDICATIONS  (STANDING):  pantoprazole  Injectable 40 milliGRAM(s) IV Push every 12 hours  sucralfate suspension 1 Gram(s) Oral every 12 hours    MEDICATIONS  (PRN):      Daily Height in cm: 187.96 (26 Apr 2022 13:10)    Daily     Drug Dosing Weight  Height (cm): 188 (26 Apr 2022 13:10)  Weight (kg): 57.3 (19 Apr 2022 09:17)  BMI (kg/m2): 16.2 (26 Apr 2022 13:10)  BSA (m2): 1.79 (26 Apr 2022 13:10)    PAST MEDICAL & SURGICAL HISTORY:  ETOH abuse    Pancreatitis    Hepatitis C  treated    Gout    Gastric perforation      FAMILY HISTORY:  FH: HTN (hypertension)        ICU Vital Signs Last 24 Hrs  T(C): 36.9 (26 Apr 2022 13:10), Max: 36.9 (26 Apr 2022 13:10)  T(F): 98.4 (26 Apr 2022 13:10), Max: 98.4 (26 Apr 2022 13:10)  HR: 82 (26 Apr 2022 13:10) (82 - 82)  BP: 100/72 (26 Apr 2022 13:10) (100/72 - 100/72)  BP(mean): --  ABP: --  ABP(mean): --  RR: 20 (26 Apr 2022 13:10) (20 - 20)  SpO2: 100% (26 Apr 2022 13:10) (100% - 100%)        I&O's Detail        LABS:  CBC Full  -  ( 26 Apr 2022 15:25 )  WBC Count : 12.96 K/uL  RBC Count : 3.09 M/uL  Hemoglobin : 8.9 g/dL  Hematocrit : 28.0 %  Platelet Count - Automated : 467 K/uL  Mean Cell Volume : 90.6 fl  Mean Cell Hemoglobin : 28.8 pg  Mean Cell Hemoglobin Concentration : 31.8 g/dL  Auto Neutrophil # : 11.11 K/uL  Auto Lymphocyte # : 1.27 K/uL  Auto Monocyte # : 0.46 K/uL  Auto Eosinophil # : 0.00 K/uL  Auto Basophil # : 0.05 K/uL  Auto Neutrophil % : 85.8 %  Auto Lymphocyte % : 9.8 %  Auto Monocyte % : 3.5 %  Auto Eosinophil % : 0.0 %  Auto Basophil % : 0.4 %    04-26    143  |  109<H>  |  63<H>  ----------------------------<  133<H>  3.3<L>   |  26  |  3.39<H>    Ca    8.4<L>      26 Apr 2022 15:25    TPro  6.0  /  Alb  2.4<L>  /  TBili  0.1<L>  /  DBili  0.1  /  AST  5<L>  /  ALT  13  /  AlkPhos  77  04-26    CAPILLARY BLOOD GLUCOSE        PT/INR - ( 26 Apr 2022 15:25 )   PT: 13.1 sec;   INR: 1.10 ratio       PTT - ( 26 Apr 2022 15:25 )  PTT:32.7 sec

## 2022-04-26 NOTE — ED PROVIDER NOTE - NS ED ATTENDING STATEMENT MOD
This was a shared visit with the SHIRLEY. I reviewed and verified the documentation and independently performed the documented:

## 2022-04-26 NOTE — ED ADULT TRIAGE NOTE - CHIEF COMPLAINT QUOTE
Procedure done at [facility] on Friday, abd pain and vomiting with coffee grounds H/O Pancreatitis

## 2022-04-26 NOTE — ED ADULT NURSE NOTE - CHIEF COMPLAINT QUOTE
Procedure done at [facility] on Friday, abd pain and vomiting with coffee grounds H/O Pancreatitis

## 2022-04-26 NOTE — ED ADULT NURSE REASSESSMENT NOTE - NS ED NURSE REASSESS COMMENT FT1
Pt sleeping in be, A&Ox3 when awake. No vomitus noted at this time. Will continue to monitor. Verbal report given to CHECO Alanis of 1D Pt sleeping in be, A&Ox3 when awake. No vomitus noted at this time. Will continue to monitor. Verbal report given to CHECO Christie of 1D

## 2022-04-26 NOTE — H&P ADULT - NSHPPHYSICALEXAM_GEN_ALL_CORE
T(C): 36.9 (04-26-22 @ 13:10), Max: 36.9 (04-26-22 @ 13:10)  HR: 82 (04-26-22 @ 13:10) (82 - 82)  BP: 100/72 (04-26-22 @ 13:10) (100/72 - 100/72)  RR: 20 (04-26-22 @ 13:10) (20 - 20)  SpO2: 100% (04-26-22 @ 13:10) (100% - 100%)    GEN: (fe)male in NAD, appears comfortable, no diaphoresis  EYES: No scleral injection, PERRL, EOMI  ENTM: neck supple & symmetric without tracheal deviation, moist membranes, no gross hearing impairment, thyroid gland not enlarged  CV: +S1/S2, no m/r/g, no abdominal bruit, no LE edema  RESP: breathing comfortably, no respiratory accessory muscle use, CTAB, no w/r/r  GI: normoactive BS, soft, NTND, no rebounding/guarding, no palpable masses  LYMPHATICS: no LAD or tenderness to palpation  NEURO: AOx3, no focal deficits, CNII-XII grossly intact  PSYCH: No SI/HI/AVH, appropriate affect, appropriate insight/judgment   SKIN: no petechiae, ecchymosis or maculopapular rash noted T(C): 36.9 (04-26-22 @ 13:10), Max: 36.9 (04-26-22 @ 13:10)  HR: 82 (04-26-22 @ 13:10) (82 - 82)  BP: 100/72 (04-26-22 @ 13:10) (100/72 - 100/72)  RR: 20 (04-26-22 @ 13:10) (20 - 20)  SpO2: 100% (04-26-22 @ 13:10) (100% - 100%)    GEN: male in writhing in pain  EYES: No scleral injection, PERRL, EOMI  ENTM: neck supple & symmetric without tracheal deviation, moist membranes, no gross hearing impairment, thyroid gland not enlarged  CV: +S1/S2, no m/r/g, no abdominal bruit, no LE edema  RESP: breathing comfortably, no respiratory accessory muscle use, CTAB, no w/r/r  GI: normoactive BS, soft, no rebounding/guarding, no palpable masses, slightly distended, tender to deep palpation in epigastric region  LYMPHATICS: no LAD or tenderness to palpation  NEURO: no focal deficits, CNII-XII grossly intact  PSYCH: No SI/HI/AVH, flat affect, poor insight/judgment   SKIN: no petechiae, ecchymosis or maculopapular rash noted

## 2022-04-26 NOTE — ED PROVIDER NOTE - PHYSICAL EXAMINATION
Physical Exam    Vital Signs: I have reviewed the initial vital signs.  Constitutional: well-nourished, appears stated age, no acute distress  Eyes: PERRLA, and symmetrical lids.  ENT: Neck supple with no adenopathy, moist MM.  Cardiovascular: regular rate, regular rhythm, well-perfused extremities  Respiratory: unlabored respiratory effort, clear to auscultation bilaterally  Gastrointestinal: soft, non-tender abdomen, non distended, no guarding, no rebound  Musculoskeletal: supple neck, no lower extremity edema  Integumentary: warm, dry, no rash  Neurologic: awake, alert, cranial nerves II-XII grossly intact, extremities’ motor and sensory functions grossly intact  Psychiatric: A&Ox3, appropriate mood, appropriate affect

## 2022-04-26 NOTE — ED PROVIDER NOTE - OBJECTIVE STATEMENT
60 yo M with hx of remote ETOH use, chronic pancreatitis, esophagitis, HCV, CKD4, and esophogeal stent placed on 4/18 pw acute onset of multiple episodes of estella hematemesis pta to the ED with no stooling or dark stools for 3 d in duration. Associated with moderately severe epigastric abd pain mod in severity non radiating ongoing for 3 d in duration. No cp, sob, loc, or near syncope.    I have reviewed available current nursing and previous documentation of past medical, surgical, family, and/or social history.

## 2022-04-26 NOTE — H&P ADULT - ASSESSMENT
61M with PMHx of alcohol abuse, chronic pancreatitis, chronic esophagitis, and CKD4 presenting for hematemesis.     #Hematemesis - possibly related to recent stent placement  -PPI BID, Carafate q12 hours, clear liquid diet  -GI consulted, likely transfer to [facility] for advance GI evaluation of stent  -Serial CBCs    #Chronic Pancreatitis  -Takes Creon with meals  -Also deals with chronic pain in this vicinity & on prior admission was on Oxycodone PRN and IV Dilaudid PRN severe pain only. Patient requesting morphine, but given renal dysfunction should not do morphine unless we run the risk of toxic metabolites collecting    #Pancreatic Stone - GI aware, advanced GI unable to address on last admission, will re-address this presentation    #YUNIEL-on-CKD4  -Renal consult appreciated  -IV hydration in setting of recent contrast use  -Renally dose medications  -Monitor Cr

## 2022-04-26 NOTE — ED ADULT NURSE NOTE - NSIMPLEMENTINTERV_GEN_ALL_ED
Implemented All Universal Safety Interventions:  [place] to call system. Call bell, personal items and telephone within reach. Instruct patient to call for assistance. Room bathroom lighting operational. Non-slip footwear when patient is off stretcher. Physically safe environment: no spills, clutter or unnecessary equipment. Stretcher in lowest position, wheels locked, appropriate side rails in place.

## 2022-04-26 NOTE — PATIENT PROFILE ADULT - FALL HARM RISK - HARM RISK INTERVENTIONS

## 2022-04-27 ENCOUNTER — APPOINTMENT (OUTPATIENT)
Dept: PAIN MANAGEMENT | Facility: CLINIC | Age: 62
End: 2022-04-27

## 2022-04-27 LAB
ANION GAP SERPL CALC-SCNC: 9 MMOL/L — SIGNIFICANT CHANGE UP (ref 5–17)
BUN SERPL-MCNC: 59 MG/DL — HIGH (ref 7–23)
CALCIUM SERPL-MCNC: 8.1 MG/DL — LOW (ref 8.5–10.1)
CHLORIDE SERPL-SCNC: 113 MMOL/L — HIGH (ref 96–108)
CO2 SERPL-SCNC: 21 MMOL/L — LOW (ref 22–31)
CREAT SERPL-MCNC: 3.15 MG/DL — HIGH (ref 0.5–1.3)
EGFR: 21 ML/MIN/1.73M2 — LOW
GLUCOSE SERPL-MCNC: 103 MG/DL — HIGH (ref 70–99)
HCT VFR BLD CALC: 21.8 % — LOW (ref 39–50)
HCT VFR BLD CALC: 22.6 % — LOW (ref 39–50)
HCT VFR BLD CALC: 27 % — LOW (ref 39–50)
HGB BLD-MCNC: 7.1 G/DL — LOW (ref 13–17)
HGB BLD-MCNC: 7.2 G/DL — LOW (ref 13–17)
HGB BLD-MCNC: 8.7 G/DL — LOW (ref 13–17)
MCHC RBC-ENTMCNC: 29.1 PG — SIGNIFICANT CHANGE UP (ref 27–34)
MCHC RBC-ENTMCNC: 29.2 PG — SIGNIFICANT CHANGE UP (ref 27–34)
MCHC RBC-ENTMCNC: 29.5 PG — SIGNIFICANT CHANGE UP (ref 27–34)
MCHC RBC-ENTMCNC: 31.9 G/DL — LOW (ref 32–36)
MCHC RBC-ENTMCNC: 32.2 G/DL — SIGNIFICANT CHANGE UP (ref 32–36)
MCHC RBC-ENTMCNC: 32.6 G/DL — SIGNIFICANT CHANGE UP (ref 32–36)
MCV RBC AUTO: 90.5 FL — SIGNIFICANT CHANGE UP (ref 80–100)
MCV RBC AUTO: 90.6 FL — SIGNIFICANT CHANGE UP (ref 80–100)
MCV RBC AUTO: 91.5 FL — SIGNIFICANT CHANGE UP (ref 80–100)
NRBC # BLD: 0 /100 WBCS — SIGNIFICANT CHANGE UP (ref 0–0)
PLATELET # BLD AUTO: 363 K/UL — SIGNIFICANT CHANGE UP (ref 150–400)
PLATELET # BLD AUTO: 367 K/UL — SIGNIFICANT CHANGE UP (ref 150–400)
PLATELET # BLD AUTO: 414 K/UL — HIGH (ref 150–400)
POTASSIUM SERPL-MCNC: 3.7 MMOL/L — SIGNIFICANT CHANGE UP (ref 3.5–5.3)
POTASSIUM SERPL-SCNC: 3.7 MMOL/L — SIGNIFICANT CHANGE UP (ref 3.5–5.3)
RBC # BLD: 2.41 M/UL — LOW (ref 4.2–5.8)
RBC # BLD: 2.47 M/UL — LOW (ref 4.2–5.8)
RBC # BLD: 2.98 M/UL — LOW (ref 4.2–5.8)
RBC # FLD: 17.2 % — HIGH (ref 10.3–14.5)
RBC # FLD: 17.2 % — HIGH (ref 10.3–14.5)
RBC # FLD: 17.3 % — HIGH (ref 10.3–14.5)
SODIUM SERPL-SCNC: 143 MMOL/L — SIGNIFICANT CHANGE UP (ref 135–145)
WBC # BLD: 10.58 K/UL — HIGH (ref 3.8–10.5)
WBC # BLD: 14.35 K/UL — HIGH (ref 3.8–10.5)
WBC # BLD: 9.57 K/UL — SIGNIFICANT CHANGE UP (ref 3.8–10.5)
WBC # FLD AUTO: 10.58 K/UL — HIGH (ref 3.8–10.5)
WBC # FLD AUTO: 14.35 K/UL — HIGH (ref 3.8–10.5)
WBC # FLD AUTO: 9.57 K/UL — SIGNIFICANT CHANGE UP (ref 3.8–10.5)

## 2022-04-27 PROCEDURE — 99233 SBSQ HOSP IP/OBS HIGH 50: CPT

## 2022-04-27 RX ORDER — OXYCODONE AND ACETAMINOPHEN 5; 325 MG/1; MG/1
2 TABLET ORAL EVERY 6 HOURS
Refills: 0 | Status: DISCONTINUED | OUTPATIENT
Start: 2022-04-27 | End: 2022-04-27

## 2022-04-27 RX ORDER — ONDANSETRON 8 MG/1
4 TABLET, FILM COATED ORAL EVERY 6 HOURS
Refills: 0 | Status: DISCONTINUED | OUTPATIENT
Start: 2022-04-27 | End: 2022-04-28

## 2022-04-27 RX ADMIN — OXYCODONE HYDROCHLORIDE 10 MILLIGRAM(S): 5 TABLET ORAL at 01:15

## 2022-04-27 RX ADMIN — OXYCODONE HYDROCHLORIDE 10 MILLIGRAM(S): 5 TABLET ORAL at 15:45

## 2022-04-27 RX ADMIN — TAMSULOSIN HYDROCHLORIDE 0.4 MILLIGRAM(S): 0.4 CAPSULE ORAL at 21:15

## 2022-04-27 RX ADMIN — PANTOPRAZOLE SODIUM 40 MILLIGRAM(S): 20 TABLET, DELAYED RELEASE ORAL at 18:04

## 2022-04-27 RX ADMIN — PANTOPRAZOLE SODIUM 40 MILLIGRAM(S): 20 TABLET, DELAYED RELEASE ORAL at 06:05

## 2022-04-27 RX ADMIN — OXYCODONE HYDROCHLORIDE 10 MILLIGRAM(S): 5 TABLET ORAL at 15:13

## 2022-04-27 RX ADMIN — Medication 2 CAPSULE(S): at 12:55

## 2022-04-27 RX ADMIN — ONDANSETRON 4 MILLIGRAM(S): 8 TABLET, FILM COATED ORAL at 06:04

## 2022-04-27 RX ADMIN — Medication 2 CAPSULE(S): at 09:13

## 2022-04-27 RX ADMIN — HYDROMORPHONE HYDROCHLORIDE 0.5 MILLIGRAM(S): 2 INJECTION INTRAMUSCULAR; INTRAVENOUS; SUBCUTANEOUS at 21:45

## 2022-04-27 RX ADMIN — Medication 25 MILLIGRAM(S): at 17:45

## 2022-04-27 RX ADMIN — Medication 1 GRAM(S): at 17:44

## 2022-04-27 RX ADMIN — Medication 1 GRAM(S): at 06:04

## 2022-04-27 RX ADMIN — Medication 25 MILLIGRAM(S): at 06:10

## 2022-04-27 RX ADMIN — HYDROMORPHONE HYDROCHLORIDE 0.5 MILLIGRAM(S): 2 INJECTION INTRAMUSCULAR; INTRAVENOUS; SUBCUTANEOUS at 21:14

## 2022-04-27 RX ADMIN — SENNA PLUS 2 TABLET(S): 8.6 TABLET ORAL at 21:15

## 2022-04-27 RX ADMIN — LACTULOSE 20 GRAM(S): 10 SOLUTION ORAL at 12:55

## 2022-04-27 RX ADMIN — OXYCODONE HYDROCHLORIDE 10 MILLIGRAM(S): 5 TABLET ORAL at 00:02

## 2022-04-27 RX ADMIN — Medication 2 CAPSULE(S): at 17:43

## 2022-04-27 NOTE — PROGRESS NOTE ADULT - ASSESSMENT
61M with PMHx of alcohol abuse, chronic pancreatitis, chronic esophagitis, and CKD4 presenting for hematemesis.     #Hematemesis - possibly related to recent stent placement  -PPI BID, Carafate q12 hours, clear liquid diet  -GI consulted, likely transfer to [facility] for advance GI evaluation of stent  -Serial CBCs  4/27/2022 transfusing 2 units on today d/w DR. Hinds who is amenable for xfer of patient . will transfer in am after prbc    #Chronic Pancreatitis  -Takes Creon with meals  -Also deals with chronic pain in this vicinity & on prior admission was on Oxycodone PRN and IV Dilaudid PRN severe pain only. Patient requesting morphine, but given renal dysfunction should not do morphine unless we run the risk of toxic metabolites collecting    #Pancreatic Stone - GI aware, advanced GI unable to address on last admission, will re-address this presentation    #YUNIEL-on-CKD4  -Renal consult appreciated  -IV hydration in setting of recent contrast use  -Renally dose medications  -Monitor Cr

## 2022-04-27 NOTE — PROGRESS NOTE ADULT - SUBJECTIVE AND OBJECTIVE BOX
Patient is a 61y old  Male who presents with a chief complaint of Hematemesis (26 Apr 2022 19:02)      OVERNIGHT EVENTS:      REVIEW OF SYSTEMS: denies chest pain/SOB, diaphoresis, no F/C, cough, dizziness, headache, blurry vision, nausea, vomiting, abdominal pain. Rest unremarkable     MEDICATIONS  (STANDING):  bisacodyl Suppository 10 milliGRAM(s) Rectal daily  gabapentin Solution 200 milliGRAM(s) Oral at bedtime  lactulose Syrup 20 Gram(s) Oral daily  pancrelipase  (CREON  6,000 Lipase Units) 2 Capsule(s) Oral three times a day with meals  pantoprazole  Injectable 40 milliGRAM(s) IV Push every 12 hours  polyethylene glycol 3350 17 Gram(s) Oral two times a day  senna 2 Tablet(s) Oral at bedtime  sodium chloride 0.9%. 1000 milliLiter(s) (80 mL/Hr) IV Continuous <Continuous>  sucralfate suspension 1 Gram(s) Oral every 12 hours  tamsulosin 0.4 milliGRAM(s) Oral at bedtime    MEDICATIONS  (PRN):  acetaminophen    Suspension .. 650 milliGRAM(s) Oral every 6 hours PRN Temp greater or equal to 38C (100.4F), Mild Pain (1 - 3)  chlorproMAZINE    Tablet 25 milliGRAM(s) Oral every 8 hours PRN hiccuping  cyclobenzaprine 5 milliGRAM(s) Oral three times a day PRN Muscle Spasm  HYDROmorphone  Injectable 0.5 milliGRAM(s) IV Push every 4 hours PRN Severe Pain (7 - 10)  ondansetron Injectable 4 milliGRAM(s) IV Push every 6 hours PRN Nausea and/or Vomiting  oxyCODONE    IR 10 milliGRAM(s) Oral every 4 hours PRN Moderate Pain (4 - 6)      Allergies    No Known Allergies    Intolerances        SUBJECTIVE: in bed in NAD, no acute events overnight     T(F): 98.1 (04-27-22 @ 15:15), Max: 98.5 (04-27-22 @ 02:00)  HR: 83 (04-27-22 @ 15:15) (74 - 92)  BP: 120/70 (04-27-22 @ 15:15) (103/57 - 140/76)  RR: 18 (04-27-22 @ 15:15) (16 - 20)  SpO2: 99% (04-27-22 @ 15:15) (97% - 100%)  Wt(kg): --    PHYSICAL EXAM:  GENERAL: NAD, thinHEAD:  Atraumatic, Normocephalic  EYES: EOMI, PERRLA, conjunctiva and sclera clear  ENMT: No tonsillar erythema, exudates, or enlargement; Moist mucous membranes, Good dentition, No lesions  NECK: Supple, No JVD, Normal thyroid  CHEST/LUNG: Clear to  auscultation bilaterally; No rales, rhonchi, wheezing, or rubs  bilaterally  HEART: Regular rate and rhythm; No murmurs, rubs, or gallops  ABDOMEN: Soft, epigastric pain , Nondistended; Bowel sounds present  EXTREMITIES:  2+ Peripheral Pulses, No clubbing, cyanosis, or edema BL LE  SKIN: No rashes or lesions  NERVOUS SYSTEM:  Alert & Oriented X3, Good concentration; Motor Strength 5/5 B/L upper and lower extremities;   DTRs 2+ intact and symmetric, sensation intact BL    LABS:                        7.1    9.57  )-----------( 367      ( 27 Apr 2022 15:50 )             21.8     04-27    143  |  113<H>  |  59<H>  ----------------------------<  103<H>  3.7   |  21<L>  |  3.15<H>    Ca    8.1<L>      27 Apr 2022 07:57    TPro  6.0  /  Alb  2.4<L>  /  TBili  0.1<L>  /  DBili  0.1  /  AST  5<L>  /  ALT  13  /  AlkPhos  77  04-26    PT/INR - ( 26 Apr 2022 15:25 )   PT: 13.1 sec;   INR: 1.10 ratio         PTT - ( 26 Apr 2022 15:25 )  PTT:32.7 sec    Cultures;   CAPILLARY BLOOD GLUCOSE        Lipid panel:           RADIOLOGY & ADDITIONAL TESTS:      Imaging Personally Reviewed:  [ x] YES      Consultant(s) Notes Reviewed:  [x ] YES     Care Discussed with [x ] Consultants [X ] Patient [x ] Family  [x ]    [x ]  Other; RN

## 2022-04-27 NOTE — PROGRESS NOTE ADULT - SUBJECTIVE AND OBJECTIVE BOX
[facility] NEPHROLOGY SERVICES, [facility]  NEPHROLOGY AND HYPERTENSION  [address]  [address]  [place] [address]  [phone]    MD AHLIE OLIVIA MD ANDREY GONCHARUK, MD MADHU KORRAPATI, MD YELENA ROSENBERG, MD BINNY KOSHY, MD CHRISTOPHER CAPUTO, MD JANICE ARREOLA MD          Patient events noted  No distress    MEDICATIONS  (STANDING):  bisacodyl Suppository 10 milliGRAM(s) Rectal daily  gabapentin Solution 200 milliGRAM(s) Oral at bedtime  lactulose Syrup 20 Gram(s) Oral daily  pancrelipase  (CREON  6,000 Lipase Units) 2 Capsule(s) Oral three times a day with meals  pantoprazole  Injectable 40 milliGRAM(s) IV Push every 12 hours  polyethylene glycol 3350 17 Gram(s) Oral two times a day  senna 2 Tablet(s) Oral at bedtime  sodium chloride 0.9%. 1000 milliLiter(s) (80 mL/Hr) IV Continuous <Continuous>  sucralfate suspension 1 Gram(s) Oral every 12 hours  tamsulosin 0.4 milliGRAM(s) Oral at bedtime    MEDICATIONS  (PRN):  acetaminophen    Suspension .. 650 milliGRAM(s) Oral every 6 hours PRN Temp greater or equal to 38C (100.4F), Mild Pain (1 - 3)  chlorproMAZINE    Tablet 25 milliGRAM(s) Oral every 8 hours PRN hiccuping  cyclobenzaprine 5 milliGRAM(s) Oral three times a day PRN Muscle Spasm  HYDROmorphone  Injectable 0.5 milliGRAM(s) IV Push every 4 hours PRN Severe Pain (7 - 10)  ondansetron Injectable 4 milliGRAM(s) IV Push every 6 hours PRN Nausea and/or Vomiting  oxyCODONE    IR 10 milliGRAM(s) Oral every 4 hours PRN Moderate Pain (4 - 6)      04-27-22 @ 07:01  -  04-27-22 @ 21:37  --------------------------------------------------------  IN: 345 mL / OUT: 400 mL / NET: -55 mL      PHYSICAL EXAM:      T(C): 36.6 (04-27-22 @ 17:53), Max: 36.9 (04-27-22 @ 02:00)  HR: 85 (04-27-22 @ 21:10) (74 - 92)  BP: 128/70 (04-27-22 @ 21:10) (103/57 - 140/76)  RR: 18 (04-27-22 @ 21:10) (16 - 20)  SpO2: 100% (04-27-22 @ 21:10) (98% - 100%)  Wt(kg): --  Lungs clear  Heart S1S2  Abd soft NT ND  Extremities:   tr edema                                    7.1    9.57  )-----------( 367      ( 27 Apr 2022 15:50 )             21.8     04-27    143  |  113<H>  |  59<H>  ----------------------------<  103<H>  3.7   |  21<L>  |  3.15<H>    Ca    8.1<L>      27 Apr 2022 07:57    TPro  6.0  /  Alb  2.4<L>  /  TBili  0.1<L>  /  DBili  0.1  /  AST  5<L>  /  ALT  13  /  AlkPhos  77  04-26      LIVER FUNCTIONS - ( 26 Apr 2022 15:25 )  Alb: 2.4 g/dL / Pro: 6.0 gm/dL / ALK PHOS: 77 U/L / ALT: 13 U/L / AST: 5 U/L / GGT: x           Creatinine Trend: 3.15<--, 3.39<--, 2.58<--, 2.60<--, 2.58<--, 2.72<--        Assessment   YUNIEL CKD 4; pre renal azotemia   Stable     Plan  IVF until po intake adequate  GI evaluation noted  Will follow       Austin Dukes MD

## 2022-04-27 NOTE — RAPID RESPONSE TEAM SUMMARY - NSMEDICATIONSRRT_GEN_ALL_CORE
- Keppra 1,000mg IV x stat --> 500mg BID starting tomorrow  - CT Head x stat  - patient becoming more alert now, responding to his name

## 2022-04-27 NOTE — RAPID RESPONSE TEAM SUMMARY - NSSITUATIONBACKGROUNDRRT_GEN_ALL_CORE
RRT called by phlebotomy for witnessed seizure.  Patient was eating lunch became unresponsive, body became rigid and started shaking.  When RRT team arrived patient post-ictal, unresponsive.  Vitals 170/91, 120bpm, 22RR, 97% 2LNC, blood sugar=150

## 2022-04-27 NOTE — PROGRESS NOTE ADULT - SUBJECTIVE AND OBJECTIVE BOX
Pt clinically stable  Hgb dropped to 7.1  +hiccups  s/p Esophageal stent placement at [facility] last week      MEDICATIONS  (STANDING):  bisacodyl Suppository 10 milliGRAM(s) Rectal daily  gabapentin Solution 200 milliGRAM(s) Oral at bedtime  lactulose Syrup 20 Gram(s) Oral daily  pancrelipase  (CREON  6,000 Lipase Units) 2 Capsule(s) Oral three times a day with meals  pantoprazole  Injectable 40 milliGRAM(s) IV Push every 12 hours  polyethylene glycol 3350 17 Gram(s) Oral two times a day  senna 2 Tablet(s) Oral at bedtime  sodium chloride 0.9%. 1000 milliLiter(s) (80 mL/Hr) IV Continuous <Continuous>  sucralfate suspension 1 Gram(s) Oral every 12 hours  tamsulosin 0.4 milliGRAM(s) Oral at bedtime    MEDICATIONS  (PRN):  acetaminophen    Suspension .. 650 milliGRAM(s) Oral every 6 hours PRN Temp greater or equal to 38C (100.4F), Mild Pain (1 - 3)  chlorproMAZINE    Tablet 25 milliGRAM(s) Oral every 8 hours PRN hiccuping  cyclobenzaprine 5 milliGRAM(s) Oral three times a day PRN Muscle Spasm  HYDROmorphone  Injectable 0.5 milliGRAM(s) IV Push every 4 hours PRN Severe Pain (7 - 10)  ondansetron Injectable 4 milliGRAM(s) IV Push every 6 hours PRN Nausea and/or Vomiting  oxyCODONE    IR 10 milliGRAM(s) Oral every 4 hours PRN Moderate Pain (4 - 6)      Allergies    No Known Allergies    Intolerances        Vital Signs Last 24 Hrs  T(C): 36.7 (27 Apr 2022 15:15), Max: 36.9 (27 Apr 2022 02:00)  T(F): 98.1 (27 Apr 2022 15:15), Max: 98.5 (27 Apr 2022 02:00)  HR: 83 (27 Apr 2022 15:15) (74 - 92)  BP: 120/70 (27 Apr 2022 15:15) (103/57 - 140/76)  BP(mean): --  RR: 18 (27 Apr 2022 15:15) (16 - 20)  SpO2: 99% (27 Apr 2022 15:15) (97% - 100%)    PHYSICAL EXAM:  General: NAD.  CVS: S1, S2  Chest: air entry bilaterally present  Abd: BS present, soft, non-tender      LABS:                        7.1    9.57  )-----------( 367      ( 27 Apr 2022 15:50 )             21.8     04-27    143  |  113<H>  |  59<H>  ----------------------------<  103<H>  3.7   |  21<L>  |  3.15<H>    Ca    8.1<L>      27 Apr 2022 07:57    TPro  6.0  /  Alb  2.4<L>  /  TBili  0.1<L>  /  DBili  0.1  /  AST  5<L>  /  ALT  13  /  AlkPhos  77  04-26    PT/INR - ( 26 Apr 2022 15:25 )   PT: 13.1 sec;   INR: 1.10 ratio         PTT - ( 26 Apr 2022 15:25 )  PTT:32.7 sec    on liquid diet  Being transfused 2u prbc's  CBC in AM  IV Protonix and PO carafate  spoke with Dr Hinds - for possible transfer back to [facility] for repeat EGD

## 2022-04-28 ENCOUNTER — TRANSCRIPTION ENCOUNTER (OUTPATIENT)
Age: 62
End: 2022-04-28

## 2022-04-28 ENCOUNTER — INPATIENT (INPATIENT)
Facility: HOSPITAL | Age: 62
LOS: 4 days | Discharge: HOME | End: 2022-05-03
Attending: HOSPITALIST | Admitting: HOSPITALIST
Payer: MEDICAID

## 2022-04-28 VITALS
TEMPERATURE: 97 F | OXYGEN SATURATION: 100 % | SYSTOLIC BLOOD PRESSURE: 139 MMHG | HEART RATE: 73 BPM | HEIGHT: 74 IN | RESPIRATION RATE: 17 BRPM | DIASTOLIC BLOOD PRESSURE: 79 MMHG | WEIGHT: 115.08 LBS

## 2022-04-28 VITALS
SYSTOLIC BLOOD PRESSURE: 152 MMHG | RESPIRATION RATE: 18 BRPM | HEART RATE: 75 BPM | DIASTOLIC BLOOD PRESSURE: 70 MMHG | OXYGEN SATURATION: 96 % | TEMPERATURE: 98 F

## 2022-04-28 DIAGNOSIS — N18.9 CHRONIC KIDNEY DISEASE, UNSPECIFIED: ICD-10-CM

## 2022-04-28 DIAGNOSIS — N18.4 CHRONIC KIDNEY DISEASE, STAGE 4 (SEVERE): ICD-10-CM

## 2022-04-28 DIAGNOSIS — D62 ACUTE POSTHEMORRHAGIC ANEMIA: ICD-10-CM

## 2022-04-28 DIAGNOSIS — K25.5 CHRONIC OR UNSPECIFIED GASTRIC ULCER WITH PERFORATION: Chronic | ICD-10-CM

## 2022-04-28 DIAGNOSIS — K92.2 GASTROINTESTINAL HEMORRHAGE, UNSPECIFIED: ICD-10-CM

## 2022-04-28 DIAGNOSIS — K92.0 HEMATEMESIS: ICD-10-CM

## 2022-04-28 DIAGNOSIS — K86.1 OTHER CHRONIC PANCREATITIS: ICD-10-CM

## 2022-04-28 DIAGNOSIS — K22.10 ULCER OF ESOPHAGUS WITHOUT BLEEDING: ICD-10-CM

## 2022-04-28 DIAGNOSIS — Z02.9 ENCOUNTER FOR ADMINISTRATIVE EXAMINATIONS, UNSPECIFIED: ICD-10-CM

## 2022-04-28 DIAGNOSIS — F10.10 ALCOHOL ABUSE, UNCOMPLICATED: ICD-10-CM

## 2022-04-28 LAB
ALBUMIN SERPL ELPH-MCNC: 2.9 G/DL — LOW (ref 3.3–5)
ALP SERPL-CCNC: 74 U/L — SIGNIFICANT CHANGE UP (ref 40–120)
ALT FLD-CCNC: 6 U/L — SIGNIFICANT CHANGE UP (ref 4–41)
ANION GAP SERPL CALC-SCNC: 11 MMOL/L — SIGNIFICANT CHANGE UP (ref 7–14)
ANION GAP SERPL CALC-SCNC: 6 MMOL/L — SIGNIFICANT CHANGE UP (ref 5–17)
AST SERPL-CCNC: 7 U/L — SIGNIFICANT CHANGE UP (ref 4–40)
BILIRUB SERPL-MCNC: <0.2 MG/DL — SIGNIFICANT CHANGE UP (ref 0.2–1.2)
BUN SERPL-MCNC: 40 MG/DL — HIGH (ref 7–23)
BUN SERPL-MCNC: 46 MG/DL — HIGH (ref 7–23)
CALCIUM SERPL-MCNC: 8.1 MG/DL — LOW (ref 8.4–10.5)
CALCIUM SERPL-MCNC: 8.1 MG/DL — LOW (ref 8.5–10.1)
CHLORIDE SERPL-SCNC: 110 MMOL/L — HIGH (ref 98–107)
CHLORIDE SERPL-SCNC: 112 MMOL/L — HIGH (ref 96–108)
CO2 SERPL-SCNC: 17 MMOL/L — LOW (ref 22–31)
CO2 SERPL-SCNC: 23 MMOL/L — SIGNIFICANT CHANGE UP (ref 22–31)
CREAT SERPL-MCNC: 2.63 MG/DL — HIGH (ref 0.5–1.3)
CREAT SERPL-MCNC: 2.82 MG/DL — HIGH (ref 0.5–1.3)
EGFR: 25 ML/MIN/1.73M2 — LOW
EGFR: 27 ML/MIN/1.73M2 — LOW
GLUCOSE SERPL-MCNC: 106 MG/DL — HIGH (ref 70–99)
GLUCOSE SERPL-MCNC: 95 MG/DL — SIGNIFICANT CHANGE UP (ref 70–99)
HCT VFR BLD CALC: 30.2 % — LOW (ref 39–50)
HCT VFR BLD CALC: 30.6 % — LOW (ref 39–50)
HGB BLD-MCNC: 10 G/DL — LOW (ref 13–17)
HGB BLD-MCNC: 9.9 G/DL — LOW (ref 13–17)
MAGNESIUM SERPL-MCNC: 2.2 MG/DL — SIGNIFICANT CHANGE UP (ref 1.6–2.6)
MCHC RBC-ENTMCNC: 28.5 PG — SIGNIFICANT CHANGE UP (ref 27–34)
MCHC RBC-ENTMCNC: 28.7 PG — SIGNIFICANT CHANGE UP (ref 27–34)
MCHC RBC-ENTMCNC: 32.7 GM/DL — SIGNIFICANT CHANGE UP (ref 32–36)
MCHC RBC-ENTMCNC: 32.8 G/DL — SIGNIFICANT CHANGE UP (ref 32–36)
MCV RBC AUTO: 87 FL — SIGNIFICANT CHANGE UP (ref 80–100)
MCV RBC AUTO: 87.7 FL — SIGNIFICANT CHANGE UP (ref 80–100)
NRBC # BLD: 0 /100 WBCS — SIGNIFICANT CHANGE UP
NRBC # BLD: 0 /100 WBCS — SIGNIFICANT CHANGE UP (ref 0–0)
NRBC # FLD: 0 K/UL — SIGNIFICANT CHANGE UP
PHOSPHATE SERPL-MCNC: 3 MG/DL — SIGNIFICANT CHANGE UP (ref 2.5–4.5)
PLATELET # BLD AUTO: 293 K/UL — SIGNIFICANT CHANGE UP (ref 150–400)
PLATELET # BLD AUTO: 308 K/UL — SIGNIFICANT CHANGE UP (ref 150–400)
POTASSIUM SERPL-MCNC: 3.8 MMOL/L — SIGNIFICANT CHANGE UP (ref 3.5–5.3)
POTASSIUM SERPL-MCNC: 3.8 MMOL/L — SIGNIFICANT CHANGE UP (ref 3.5–5.3)
POTASSIUM SERPL-SCNC: 3.8 MMOL/L — SIGNIFICANT CHANGE UP (ref 3.5–5.3)
POTASSIUM SERPL-SCNC: 3.8 MMOL/L — SIGNIFICANT CHANGE UP (ref 3.5–5.3)
PROT SERPL-MCNC: 5.7 G/DL — LOW (ref 6–8.3)
RBC # BLD: 3.47 M/UL — LOW (ref 4.2–5.8)
RBC # BLD: 3.49 M/UL — LOW (ref 4.2–5.8)
RBC # FLD: 18.6 % — HIGH (ref 10.3–14.5)
RBC # FLD: 18.9 % — HIGH (ref 10.3–14.5)
SODIUM SERPL-SCNC: 138 MMOL/L — SIGNIFICANT CHANGE UP (ref 135–145)
SODIUM SERPL-SCNC: 141 MMOL/L — SIGNIFICANT CHANGE UP (ref 135–145)
WBC # BLD: 9.5 K/UL — SIGNIFICANT CHANGE UP (ref 3.8–10.5)
WBC # BLD: 9.63 K/UL — SIGNIFICANT CHANGE UP (ref 3.8–10.5)
WBC # FLD AUTO: 9.5 K/UL — SIGNIFICANT CHANGE UP (ref 3.8–10.5)
WBC # FLD AUTO: 9.63 K/UL — SIGNIFICANT CHANGE UP (ref 3.8–10.5)

## 2022-04-28 PROCEDURE — 99233 SBSQ HOSP IP/OBS HIGH 50: CPT | Mod: GC

## 2022-04-28 PROCEDURE — 99239 HOSP IP/OBS DSCHRG MGMT >30: CPT

## 2022-04-28 PROCEDURE — 99223 1ST HOSP IP/OBS HIGH 75: CPT | Mod: GC

## 2022-04-28 PROCEDURE — 99254 IP/OBS CNSLTJ NEW/EST MOD 60: CPT | Mod: GC

## 2022-04-28 RX ORDER — HYDROMORPHONE HYDROCHLORIDE 2 MG/ML
0.5 INJECTION INTRAMUSCULAR; INTRAVENOUS; SUBCUTANEOUS
Qty: 0 | Refills: 0 | DISCHARGE
Start: 2022-04-28

## 2022-04-28 RX ORDER — LIPASE/PROTEASE/AMYLASE 16-48-48K
2 CAPSULE,DELAYED RELEASE (ENTERIC COATED) ORAL
Refills: 0 | Status: DISCONTINUED | OUTPATIENT
Start: 2022-04-28 | End: 2022-05-03

## 2022-04-28 RX ORDER — TAMSULOSIN HYDROCHLORIDE 0.4 MG/1
0.4 CAPSULE ORAL AT BEDTIME
Refills: 0 | Status: DISCONTINUED | OUTPATIENT
Start: 2022-04-28 | End: 2022-05-03

## 2022-04-28 RX ORDER — SUCRALFATE 1 G
1 TABLET ORAL
Refills: 0 | Status: DISCONTINUED | OUTPATIENT
Start: 2022-04-28 | End: 2022-04-30

## 2022-04-28 RX ORDER — HYDROMORPHONE HYDROCHLORIDE 2 MG/ML
0.5 INJECTION INTRAMUSCULAR; INTRAVENOUS; SUBCUTANEOUS EVERY 4 HOURS
Refills: 0 | Status: DISCONTINUED | OUTPATIENT
Start: 2022-04-28 | End: 2022-05-03

## 2022-04-28 RX ORDER — CHLORPROMAZINE HCL 10 MG
10 TABLET ORAL EVERY 8 HOURS
Refills: 0 | Status: DISCONTINUED | OUTPATIENT
Start: 2022-04-28 | End: 2022-05-03

## 2022-04-28 RX ORDER — THIAMINE MONONITRATE (VIT B1) 100 MG
100 TABLET ORAL DAILY
Refills: 0 | Status: DISCONTINUED | OUTPATIENT
Start: 2022-04-28 | End: 2022-05-03

## 2022-04-28 RX ORDER — SODIUM CHLORIDE 9 MG/ML
1000 INJECTION INTRAMUSCULAR; INTRAVENOUS; SUBCUTANEOUS
Refills: 0 | Status: DISCONTINUED | OUTPATIENT
Start: 2022-04-28 | End: 2022-04-28

## 2022-04-28 RX ORDER — POLYETHYLENE GLYCOL 3350 17 G/17G
17 POWDER, FOR SOLUTION ORAL
Refills: 0 | Status: DISCONTINUED | OUTPATIENT
Start: 2022-04-28 | End: 2022-05-03

## 2022-04-28 RX ORDER — LANOLIN ALCOHOL/MO/W.PET/CERES
3 CREAM (GRAM) TOPICAL AT BEDTIME
Refills: 0 | Status: DISCONTINUED | OUTPATIENT
Start: 2022-04-28 | End: 2022-05-03

## 2022-04-28 RX ORDER — CYCLOBENZAPRINE HYDROCHLORIDE 10 MG/1
5 TABLET, FILM COATED ORAL THREE TIMES A DAY
Refills: 0 | Status: DISCONTINUED | OUTPATIENT
Start: 2022-04-28 | End: 2022-05-03

## 2022-04-28 RX ORDER — PANTOPRAZOLE SODIUM 20 MG/1
40 TABLET, DELAYED RELEASE ORAL DAILY
Refills: 0 | Status: DISCONTINUED | OUTPATIENT
Start: 2022-04-28 | End: 2022-05-02

## 2022-04-28 RX ORDER — SENNA PLUS 8.6 MG/1
2 TABLET ORAL AT BEDTIME
Refills: 0 | Status: DISCONTINUED | OUTPATIENT
Start: 2022-04-28 | End: 2022-05-03

## 2022-04-28 RX ORDER — GABAPENTIN 400 MG/1
200 CAPSULE ORAL AT BEDTIME
Refills: 0 | Status: DISCONTINUED | OUTPATIENT
Start: 2022-04-28 | End: 2022-05-03

## 2022-04-28 RX ORDER — ONDANSETRON 8 MG/1
4 TABLET, FILM COATED ORAL EVERY 8 HOURS
Refills: 0 | Status: DISCONTINUED | OUTPATIENT
Start: 2022-04-28 | End: 2022-05-03

## 2022-04-28 RX ORDER — FOLIC ACID 0.8 MG
1 TABLET ORAL DAILY
Refills: 0 | Status: DISCONTINUED | OUTPATIENT
Start: 2022-04-28 | End: 2022-05-03

## 2022-04-28 RX ORDER — ACETAMINOPHEN 500 MG
650 TABLET ORAL EVERY 6 HOURS
Refills: 0 | Status: DISCONTINUED | OUTPATIENT
Start: 2022-04-28 | End: 2022-05-03

## 2022-04-28 RX ORDER — OXYCODONE HYDROCHLORIDE 5 MG/1
10 TABLET ORAL EVERY 6 HOURS
Refills: 0 | Status: ACTIVE | OUTPATIENT
Start: 2022-04-28 | End: 2022-05-05

## 2022-04-28 RX ADMIN — HYDROMORPHONE HYDROCHLORIDE 0.5 MILLIGRAM(S): 2 INJECTION INTRAMUSCULAR; INTRAVENOUS; SUBCUTANEOUS at 12:21

## 2022-04-28 RX ADMIN — Medication 1 MILLIGRAM(S): at 18:49

## 2022-04-28 RX ADMIN — Medication 1 GRAM(S): at 06:26

## 2022-04-28 RX ADMIN — PANTOPRAZOLE SODIUM 40 MILLIGRAM(S): 20 TABLET, DELAYED RELEASE ORAL at 18:49

## 2022-04-28 RX ADMIN — Medication 100 MILLIGRAM(S): at 18:49

## 2022-04-28 RX ADMIN — Medication 2 CAPSULE(S): at 18:49

## 2022-04-28 RX ADMIN — LACTULOSE 20 GRAM(S): 10 SOLUTION ORAL at 12:21

## 2022-04-28 RX ADMIN — HYDROMORPHONE HYDROCHLORIDE 0.5 MILLIGRAM(S): 2 INJECTION INTRAMUSCULAR; INTRAVENOUS; SUBCUTANEOUS at 08:08

## 2022-04-28 RX ADMIN — Medication 2 CAPSULE(S): at 08:05

## 2022-04-28 RX ADMIN — HYDROMORPHONE HYDROCHLORIDE 0.5 MILLIGRAM(S): 2 INJECTION INTRAMUSCULAR; INTRAVENOUS; SUBCUTANEOUS at 12:36

## 2022-04-28 RX ADMIN — SODIUM CHLORIDE 80 MILLILITER(S): 9 INJECTION INTRAMUSCULAR; INTRAVENOUS; SUBCUTANEOUS at 08:05

## 2022-04-28 RX ADMIN — Medication 2 CAPSULE(S): at 12:21

## 2022-04-28 RX ADMIN — Medication 25 MILLIGRAM(S): at 12:25

## 2022-04-28 RX ADMIN — OXYCODONE HYDROCHLORIDE 10 MILLIGRAM(S): 5 TABLET ORAL at 19:03

## 2022-04-28 RX ADMIN — HYDROMORPHONE HYDROCHLORIDE 0.5 MILLIGRAM(S): 2 INJECTION INTRAMUSCULAR; INTRAVENOUS; SUBCUTANEOUS at 02:54

## 2022-04-28 RX ADMIN — Medication 10 MILLIGRAM(S): at 20:00

## 2022-04-28 RX ADMIN — HYDROMORPHONE HYDROCHLORIDE 0.5 MILLIGRAM(S): 2 INJECTION INTRAMUSCULAR; INTRAVENOUS; SUBCUTANEOUS at 03:20

## 2022-04-28 RX ADMIN — PANTOPRAZOLE SODIUM 40 MILLIGRAM(S): 20 TABLET, DELAYED RELEASE ORAL at 06:27

## 2022-04-28 RX ADMIN — Medication 25 MILLIGRAM(S): at 02:57

## 2022-04-28 RX ADMIN — HYDROMORPHONE HYDROCHLORIDE 0.5 MILLIGRAM(S): 2 INJECTION INTRAMUSCULAR; INTRAVENOUS; SUBCUTANEOUS at 08:23

## 2022-04-28 NOTE — PATIENT PROFILE ADULT - FALL HARM RISK - HARM RISK INTERVENTIONS

## 2022-04-28 NOTE — H&P ADULT - NSICDXPASTMEDICALHX_GEN_ALL_CORE_FT
PAST MEDICAL HISTORY:  ETOH abuse sober x1 year approximately    Gout     Hepatitis C treated    Pancreatitis

## 2022-04-28 NOTE — H&P ADULT - PROBLEM SELECTOR PLAN 5
- active type and screen  - 2 18g or larger IVs active  - PPI BID  - given ceftriaxone at VS, can hold for now  - GI consulted, plan for EGD in AM

## 2022-04-28 NOTE — DISCHARGE NOTE PROVIDER - NSDCMRMEDTOKEN_GEN_ALL_CORE_FT
bisacodyl 10 mg rectal suppository: 1 suppository(ies) rectal once a day  chlorproMAZINE 10 mg oral tablet: 1 tab(s) orally every 8 hours, As needed, hiccuping MDD:30mg  cyclobenzaprine 5 mg oral tablet: 1 tab(s) orally 3 times a day, As needed, hiccuping MDD:15mg  folic acid 1 mg oral tablet: 1 tab(s) orally once a day  gabapentin 250 mg/5 mL oral solution: 4 milliliter(s) orally once a day (at bedtime) MDD:200mg  HYDROmorphone: 0.5 milligram(s) intravenous every 4 hours (5 times/day)  lactulose 10 g/15 mL oral syrup: 30 milliliter(s) orally once a day as needed for constipation  oxyCODONE 10 mg oral tablet: 1 tab(s) orally every 4 hours, As Needed -for severe pain MDD:6 tablets   pancrelipase 6000 units-19,000 units-30,000 units oral delayed release capsule: 2 cap(s) orally 3 times a day (with meals)  pantoprazole 40 mg oral delayed release tablet: 1 tab(s) orally 2 times a day   polyethylene glycol 3350 oral powder for reconstitution: 17 gram(s) orally 2 times a day  senna oral tablet: 2 tab(s) orally once a day (at bedtime)  sucralfate 1 g oral tablet: 1 tab(s) orally every 6 hours   tamsulosin 0.4 mg oral capsule: 1 cap(s) orally once a day (at bedtime)  thiamine 100 mg oral tablet: 1 tab(s) orally once a day

## 2022-04-28 NOTE — H&P ADULT - NSHPLABSRESULTS_GEN_ALL_CORE
9.9    9.50  )-----------( 308      ( 28 Apr 2022 09:53 )             30.2       04-28    141  |  112<H>  |  46<H>  ----------------------------<  95  3.8   |  23  |  2.82<H>    Ca    8.1<L>      28 Apr 2022 09:53  Phos  3.0     04-28  Mg     2.2     04-28          CAPILLARY BLOOD GLUCOSE

## 2022-04-28 NOTE — PROGRESS NOTE ADULT - SUBJECTIVE AND OBJECTIVE BOX
Late entry..    Patient is a 61y old  Male who presents with a chief complaint of Hematemesis (26 Apr 2022 19:02)      OVERNIGHT EVENTS: none         MEDICATIONS  (STANDING):  bisacodyl Suppository 10 milliGRAM(s) Rectal daily  gabapentin Solution 200 milliGRAM(s) Oral at bedtime  lactulose Syrup 20 Gram(s) Oral daily  pancrelipase  (CREON  6,000 Lipase Units) 2 Capsule(s) Oral three times a day with meals  pantoprazole  Injectable 40 milliGRAM(s) IV Push every 12 hours  polyethylene glycol 3350 17 Gram(s) Oral two times a day  senna 2 Tablet(s) Oral at bedtime  sodium chloride 0.9%. 1000 milliLiter(s) (80 mL/Hr) IV Continuous <Continuous>  sucralfate suspension 1 Gram(s) Oral every 12 hours  tamsulosin 0.4 milliGRAM(s) Oral at bedtime    MEDICATIONS  (PRN):  acetaminophen    Suspension .. 650 milliGRAM(s) Oral every 6 hours PRN Temp greater or equal to 38C (100.4F), Mild Pain (1 - 3)  chlorproMAZINE    Tablet 25 milliGRAM(s) Oral every 8 hours PRN hiccuping  cyclobenzaprine 5 milliGRAM(s) Oral three times a day PRN Muscle Spasm  HYDROmorphone  Injectable 0.5 milliGRAM(s) IV Push every 4 hours PRN Severe Pain (7 - 10)  ondansetron Injectable 4 milliGRAM(s) IV Push every 6 hours PRN Nausea and/or Vomiting  oxyCODONE    IR 10 milliGRAM(s) Oral every 4 hours PRN Moderate Pain (4 - 6)      Allergies    No Known Allergies    Intolerances        SUBJECTIVE: in bed in NAD, no acute events overnight     T(F): 98.3  HR: 78  BP: 130/68  RR: 18   SpO2: 100%   Wt(kg): --    PHYSICAL EXAM:  GENERAL: NAD, thinHEAD:  Atraumatic, Normocephalic  EYES: EOMI, PERRLA, conjunctiva and sclera clear  ENMT: No tonsillar erythema, exudates, or enlargement; Moist mucous membranes, Good dentition, No lesions  NECK: Supple, No JVD, Normal thyroid  CHEST/LUNG: Clear to  auscultation bilaterally; No rales, rhonchi, wheezing, or rubs  bilaterally  HEART: Regular rate and rhythm; No murmurs, rubs, or gallops  ABDOMEN: Soft, epigastric pain , Nondistended; Bowel sounds present  EXTREMITIES:  2+ Peripheral Pulses, No clubbing, cyanosis, or edema BL LE  SKIN: No rashes or lesions  NERVOUS SYSTEM:  Alert & Oriented X3, Good concentration; Motor Strength 5/5 B/L upper and lower extremities;   DTRs 2+ intact and symmetric, sensation intact BL    LABS:                       labs reviewed     Cultures;   CAPILLARY BLOOD GLUCOSE        Lipid panel:           RADIOLOGY & ADDITIONAL TESTS:      Imaging Personally Reviewed:  [ x] YES      Consultant(s) Notes Reviewed:  [x ] YES     Care Discussed with [x ] Consultants [X ] Patient [x ] Family  [x ]    [x ]  Other; RN

## 2022-04-28 NOTE — DISCHARGE NOTE PROVIDER - NSDCCPCAREPLAN_GEN_ALL_CORE_FT
PRINCIPAL DISCHARGE DIAGNOSIS  Diagnosis: GI bleed  Assessment and Plan of Treatment:       SECONDARY DISCHARGE DIAGNOSES  Diagnosis: Chronic pancreatitis  Assessment and Plan of Treatment:     Diagnosis: Stricture esophagus  Assessment and Plan of Treatment:      PRINCIPAL DISCHARGE DIAGNOSIS  Diagnosis: Acute blood loss anemia (ABLA)  Assessment and Plan of Treatment:       SECONDARY DISCHARGE DIAGNOSES  Diagnosis: GIB (gastrointestinal bleeding)  Assessment and Plan of Treatment:     Diagnosis: Chronic pancreatitis  Assessment and Plan of Treatment:     Diagnosis: Stricture esophagus  Assessment and Plan of Treatment:

## 2022-04-28 NOTE — H&P ADULT - PROBLEM SELECTOR PLAN 1
- Last episode hematemesis 4/26  - ongoing hgb drop, s/p 2u transfusion prior to transfer 4/28.  Total 4u transfused.  - PPI BID  - CLD until EGD per GI - Last episode hematemesis 4/26. Possibly 2/2 ulceration in the setting of esophageal stent placement   - ongoing hgb drop, s/p 2u transfusion prior to transfer 4/28.  Total 4u transfused.  - PPI BID  - trend CBC q12hr  - CLD until EGD per GI

## 2022-04-28 NOTE — CONSULT NOTE ADULT - SUBJECTIVE AND OBJECTIVE BOX
HPI:  61M with pmh alcohol abuse, chronic pancreatitis, chronic esophagitis, CKD4 who presents, transferred from outside hospital with hematemesis and YUNIEL on CKD.  Transferred to [facility] due to history of esophageal stricture with recent stent placement with [facility] on 4/22, accepted by Dr. Hinds for advanced GI evaluation and possible intervention.    Patient with ongoing bleeding at [place], with hgb drop 4/27, s/p 2u pRBCs prior to transfer.      Allergies:  No Known Allergies        Hospital Medications:  acetaminophen     Tablet .. 650 milliGRAM(s) Oral every 6 hours PRN  chlorproMAZINE    Tablet 10 milliGRAM(s) Oral every 8 hours PRN  cyclobenzaprine 5 milliGRAM(s) Oral three times a day PRN  folic acid 1 milliGRAM(s) Oral daily  gabapentin Solution 200 milliGRAM(s) Oral at bedtime  HYDROmorphone  Injectable 0.5 milliGRAM(s) IV Push every 4 hours PRN  melatonin 3 milliGRAM(s) Oral at bedtime PRN  ondansetron Injectable 4 milliGRAM(s) IV Push every 8 hours PRN  oxyCODONE    IR 10 milliGRAM(s) Oral every 6 hours PRN  pancrelipase  (CREON  6,000 Lipase Units) 2 Capsule(s) Oral three times a day with meals  pantoprazole  Injectable 40 milliGRAM(s) IV Push daily  polyethylene glycol 3350 17 Gram(s) Oral two times a day  senna 2 Tablet(s) Oral at bedtime  tamsulosin 0.4 milliGRAM(s) Oral at bedtime  thiamine 100 milliGRAM(s) Oral daily      PMHX/PSHX:  ETOH abuse    Pancreatitis    Hepatitis C    Gout    No significant past surgical history    Gastric perforation        Family history:  No pertinent family history in first degree relatives    FH: HTN (hypertension)        Social History: no smoking    ROS:   General:  No fevers, chills or night sweats  ENT:  No sore throat or dysphagia  CV:  No pain or palpitations  Resp:  No dyspnea, cough or  wheezing  GI:  as above  Skin:  No rash or edema  Neuro: no weakness   Hematologic: no bleeding  Musculoskeletal: no muscle pain or join pain  Psych: no agitation     : no dysuria      PHYSICAL EXAM:   GENERAL:  NAD, Appears stated age  HEENT:  NC/AT,  conjunctivae clear and pink, sclera -anicteric  CHEST:  CTA B/L, Normal effort  HEART:  RRR S1/S2,  ABDOMEN:  mild abdominal discomfort   EXTREMITIES:  No cyanosis or Edema  SKIN:  Warm & Dry. No rash or erythema  NEURO:  Alert, oriented, no focal deficit    Vital Signs:  Vital Signs Last 24 Hrs  T(C): 36.5 (28 Apr 2022 14:11), Max: 36.8 (28 Apr 2022 05:32)  T(F): 97.7 (28 Apr 2022 14:11), Max: 98.3 (28 Apr 2022 05:32)  HR: 75 (28 Apr 2022 14:11) (75 - 85)  BP: 152/70 (28 Apr 2022 14:11) (121/70 - 152/70)  BP(mean): --  RR: 18 (28 Apr 2022 14:11) (17 - 18)  SpO2: 96% (28 Apr 2022 14:11) (96% - 100%)  Daily     Daily     LABS:                        9.9    9.50  )-----------( 308      ( 28 Apr 2022 09:53 )             30.2     Mean Cell Volume: 87.0 fl (04-28-22 @ 09:53)    04-28    141  |  112<H>  |  46<H>  ----------------------------<  95  3.8   |  23  |  2.82<H>    Ca    8.1<L>      28 Apr 2022 09:53  Phos  3.0     04-28  Mg     2.2     04-28                                    9.9    9.50  )-----------( 308      ( 28 Apr 2022 09:53 )             30.2                         7.1    9.57  )-----------( 367      ( 27 Apr 2022 15:50 )             21.8                         7.2    10.58 )-----------( 363      ( 27 Apr 2022 07:57 )             22.6                         8.7    14.35 )-----------( 414      ( 27 Apr 2022 00:06 )             27.0                         8.9    12.96 )-----------( 467      ( 26 Apr 2022 15:25 )             28.0     Imaging:  < from: CT Abdomen and Pelvis w/ IV Cont (04.26.22 @ 16:16) >  PROCEDURE:  CT of the Abdomen and Pelvis was performed.  Sagittal and coronal reformats were performed.    FINDINGS: The examination is limited by respiratory motion artifact.  LOWER CHEST: Mild right dependent pleural parenchymal changes.    LIVER: Within normal limits.  BILE DUCTS: Normal caliber.  GALLBLADDER: Within normal limits.  SPLEEN: Within normal limits.  PANCREAS:Coarse calcifications are again seen throughout the pancreas,   suggestive of chronic pancreatitis. Stable dilatation of the main   pancreatic duct in the body and tail may be due to a possible 1.6 cm   stone in the main pancreatic duct (image 45 series 2). Atrophic pancreas.   No CT evidence for acute pancreatitis.  ADRENALS: Within normal limits.  KIDNEYS/URETERS: 2 left renal cysts. The right kidney appears   unremarkable. No hydronephrosis.    BLADDER: Within normal limits.  REPRODUCTIVE ORGANS: The prostate is enlarged.    BOWEL: No bowel obstruction. Moderate amount of stool in the colon and   rectum. Appendix not visualized due to paucity of intra-abdominal fat.   Mural thickening of the distal esophagus and gastroesophageal junction   with interval placement of an esophageal/GE junction stent. Correlation   with prior EGD is recommended.  PERITONEUM: No gross free air or ascites.  VESSELS: Calcified atherosclerotic disease.  RETROPERITONEUM/LYMPH NODES: No lymphadenopathy.  ABDOMINAL WALL: Within normal limits.  BONES: Within normal limits.    IMPRESSION:  Mural thickening of the distal esophagus and gastroesophageal junction   with interval placement of an esophageal/GE junction stent. Correlation   with prior EGD is recommended.    Coarse calcifications are again seen throughout the pancreas, suggestive   of chronic pancreatitis. Stable dilatation of the main pancreatic duct in   the body and tail may be due to a possible 1.6 cm obstructing stone in   the main pancreaticduct.      < end of copied text >

## 2022-04-28 NOTE — H&P ADULT - NSHPREVIEWOFSYSTEMS_GEN_ALL_CORE
In additional the that documented in the HPI, the additional ROS was obtained:    CONSTITUTIONAL: No fever, no chills  EYES: no change in vision, no blurred vision  HEENT: no trouble swallowing, no trouble speaking, no sore throat  CV: no chest pain, no palpitations  RESP: no cough, no shortness of breath  GI: +abdominal pain, no nausea, no vomiting, no diarrhea, no constipation +hiccups  : No dysuria, no frequency  NEURO: no headache, no new numbness, no weakness, no dizziness  SKIN: no new rashes  HEME: No easy bruising or bleeding  MSK: no recent trauma  Juan Pablo Pulliam M.D. -Resident

## 2022-04-28 NOTE — PROGRESS NOTE ADULT - SUBJECTIVE AND OBJECTIVE BOX
NEPHROLOGY PROGRESS NOTE    CHIEF COMPLAINT:  YUNIEL/CKD    HPI:  Renal function has been improving.  Asking for advancement of diet.    ROS:  no SOB    EXAM:  T(F): 98.2 (04-28-22 @ 10:10)  HR: 77 (04-28-22 @ 10:10)  BP: 121/70 (04-28-22 @ 10:10)  RR: 18 (04-28-22 @ 10:10)  SpO2: 100% (04-28-22 @ 10:10)    Conversant, in no apparent distress  Normal respiratory effort, lungs clear bilaterally  Heart RRR with no murmur, no peripheral edema         LABS                             9.9    9.50  )-----------( 308      ( 28 Apr 2022 09:53 )             30.2          04-27    143  |  113<H>  |  59<H>  ----------------------------<  103<H>  3.7   |  21<L>  |  3.15<H>    Ca    8.1<L>      27 Apr 2022 07:57    TPro  6.0  /  Alb  2.4<L>  /  TBili  0.1<L>  /  DBili  0.1  /  AST  5<L>  /  ALT  13  /  AlkPhos  77  04-26           Assessment   YUNIEL CKD 4; pre renal azotemia   Stable     Plan  Check BMP today  IVF until po intake adequate

## 2022-04-28 NOTE — H&P ADULT - ATTENDING COMMENTS
61M with pmh alcohol abuse, chronic pancreatitis, chronic esophagitis, CKD4 who presents, transferred from outside hospital with hematemesis and YUNIEL on CKD. Pt with drop in hgb requiring 2 unit transfusion at OSH. On CT imaging, Mural thickening of the distal esophagus and gastroesophageal junction with interval placement of an esophageal/GE junction stent concerning for ulceration 2/2 stent. Pt denies any further episodes of hematemesis, melena or BRBPR. Continue with PPI BID, carafate, plan for EGD tomorrow for further eval. Trend CBC q12h, monitor VS. Pt also reports chronic pain 2/2 pancreatitis. Treat with oxycodone (home med) with dilaudid for severe pain. YUNIEL on CKD 2/2 acute blood loss, Cr improving back to baseline. Pt also noted with chronic hiccups, c/w gabapentin and Thorazine. Rest of care as above

## 2022-04-28 NOTE — H&P ADULT - ASSESSMENT
61M with pmh alcohol abuse (sober x1 year), chronic pancreatitis, chronic esophagitis with stricture (s/p stent 4/19/22), CKD4 who presents transferred from [facility] for advanced GI evaluation due to abdominal pain and hematemesis on 4/26.

## 2022-04-28 NOTE — PROGRESS NOTE ADULT - ASSESSMENT
61M with PMHx of alcohol abuse, chronic pancreatitis, chronic esophagitis, and CKD4 presenting for hematemesis.     #Hematemesis - possibly related to recent stent placement  -PPI BID, Carafate q12 hours, clear liquid diet  -GI consulted, likely transfer to [facility] for advance GI evaluation of stent  -Serial CBCs  4/27/2022 transfusing 2 units on today d/w DR. Hinds who is amenable for xfer of patient . will transfer in am after prbc  4/28/2022 d/w with transfer center patient to be transferred to [facility] for evaluation    #Chronic Pancreatitis  -Takes Creon with meals  -Also deals with chronic pain in this vicinity & on prior admission was on Oxycodone PRN and IV Dilaudid PRN severe pain only. Patient requesting morphine, but given renal dysfunction should not do morphine unless we run the risk of toxic metabolites collecting    #Pancreatic Stone - GI aware, advanced GI unable to address on last admission, will re-address this presentation    #YUNIEL-on-CKD4  -Renal consult appreciated  -IV hydration in setting of recent contrast use  -Renally dose medications  -Monitor Cr

## 2022-04-28 NOTE — PROGRESS NOTE ADULT - SUBJECTIVE AND OBJECTIVE BOX
Pt stable - no active bleeding  H/H stable  Pt wants regular food      MEDICATIONS  (STANDING):  bisacodyl Suppository 10 milliGRAM(s) Rectal daily  gabapentin Solution 200 milliGRAM(s) Oral at bedtime  lactulose Syrup 20 Gram(s) Oral daily  pancrelipase  (CREON  6,000 Lipase Units) 2 Capsule(s) Oral three times a day with meals  pantoprazole  Injectable 40 milliGRAM(s) IV Push every 12 hours  polyethylene glycol 3350 17 Gram(s) Oral two times a day  senna 2 Tablet(s) Oral at bedtime  sodium chloride 0.9%. 1000 milliLiter(s) (80 mL/Hr) IV Continuous <Continuous>  sucralfate suspension 1 Gram(s) Oral every 12 hours  tamsulosin 0.4 milliGRAM(s) Oral at bedtime    MEDICATIONS  (PRN):  acetaminophen    Suspension .. 650 milliGRAM(s) Oral every 6 hours PRN Temp greater or equal to 38C (100.4F), Mild Pain (1 - 3)  chlorproMAZINE    Tablet 25 milliGRAM(s) Oral every 8 hours PRN hiccuping  cyclobenzaprine 5 milliGRAM(s) Oral three times a day PRN Muscle Spasm  HYDROmorphone  Injectable 0.5 milliGRAM(s) IV Push every 4 hours PRN Severe Pain (7 - 10)  ondansetron Injectable 4 milliGRAM(s) IV Push every 6 hours PRN Nausea and/or Vomiting  oxyCODONE    IR 10 milliGRAM(s) Oral every 4 hours PRN Moderate Pain (4 - 6)      Allergies    No Known Allergies    Intolerances        Vital Signs Last 24 Hrs  T(C): 36.8 (28 Apr 2022 10:10), Max: 36.9 (27 Apr 2022 14:57)  T(F): 98.2 (28 Apr 2022 10:10), Max: 98.5 (27 Apr 2022 14:57)  HR: 77 (28 Apr 2022 10:10) (74 - 85)  BP: 121/70 (28 Apr 2022 10:10) (103/57 - 136/80)  BP(mean): --  RR: 18 (28 Apr 2022 10:10) (17 - 19)  SpO2: 100% (28 Apr 2022 10:10) (99% - 100%)    PHYSICAL EXAM:  General: NAD.  CVS: S1, S2  Chest: air entry bilaterally present  Abd: BS present, soft, non-tender      LABS:                        9.9    9.50  )-----------( 308      ( 28 Apr 2022 09:53 )             30.2     04-28    141  |  112<H>  |  46<H>  ----------------------------<  95  3.8   |  23  |  2.82<H>    Ca    8.1<L>      28 Apr 2022 09:53  Phos  3.0     04-28  Mg     2.2     04-28    TPro  6.0  /  Alb  2.4<L>  /  TBili  0.1<L>  /  DBili  0.1  /  AST  5<L>  /  ALT  13  /  AlkPhos  77  04-26    PT/INR - ( 26 Apr 2022 15:25 )   PT: 13.1 sec;   INR: 1.10 ratio         PTT - ( 26 Apr 2022 15:25 )  PTT:32.7 sec    diet as tolerated  Suggest pt f/u with GI at [facility] where stent was placed         Pt stable - no active bleeding  H/H stable  Pt wants regular food  Awaiting transfer to [facility]      MEDICATIONS  (STANDING):  bisacodyl Suppository 10 milliGRAM(s) Rectal daily  gabapentin Solution 200 milliGRAM(s) Oral at bedtime  lactulose Syrup 20 Gram(s) Oral daily  pancrelipase  (CREON  6,000 Lipase Units) 2 Capsule(s) Oral three times a day with meals  pantoprazole  Injectable 40 milliGRAM(s) IV Push every 12 hours  polyethylene glycol 3350 17 Gram(s) Oral two times a day  senna 2 Tablet(s) Oral at bedtime  sodium chloride 0.9%. 1000 milliLiter(s) (80 mL/Hr) IV Continuous <Continuous>  sucralfate suspension 1 Gram(s) Oral every 12 hours  tamsulosin 0.4 milliGRAM(s) Oral at bedtime    MEDICATIONS  (PRN):  acetaminophen    Suspension .. 650 milliGRAM(s) Oral every 6 hours PRN Temp greater or equal to 38C (100.4F), Mild Pain (1 - 3)  chlorproMAZINE    Tablet 25 milliGRAM(s) Oral every 8 hours PRN hiccuping  cyclobenzaprine 5 milliGRAM(s) Oral three times a day PRN Muscle Spasm  HYDROmorphone  Injectable 0.5 milliGRAM(s) IV Push every 4 hours PRN Severe Pain (7 - 10)  ondansetron Injectable 4 milliGRAM(s) IV Push every 6 hours PRN Nausea and/or Vomiting  oxyCODONE    IR 10 milliGRAM(s) Oral every 4 hours PRN Moderate Pain (4 - 6)      Allergies    No Known Allergies    Intolerances        Vital Signs Last 24 Hrs  T(C): 36.8 (28 Apr 2022 10:10), Max: 36.9 (27 Apr 2022 14:57)  T(F): 98.2 (28 Apr 2022 10:10), Max: 98.5 (27 Apr 2022 14:57)  HR: 77 (28 Apr 2022 10:10) (74 - 85)  BP: 121/70 (28 Apr 2022 10:10) (103/57 - 136/80)  BP(mean): --  RR: 18 (28 Apr 2022 10:10) (17 - 19)  SpO2: 100% (28 Apr 2022 10:10) (99% - 100%)    PHYSICAL EXAM:  General: NAD.  CVS: S1, S2  Chest: air entry bilaterally present  Abd: BS present, soft, non-tender      LABS:                        9.9    9.50  )-----------( 308      ( 28 Apr 2022 09:53 )             30.2     04-28    141  |  112<H>  |  46<H>  ----------------------------<  95  3.8   |  23  |  2.82<H>    Ca    8.1<L>      28 Apr 2022 09:53  Phos  3.0     04-28  Mg     2.2     04-28    TPro  6.0  /  Alb  2.4<L>  /  TBili  0.1<L>  /  DBili  0.1  /  AST  5<L>  /  ALT  13  /  AlkPhos  77  04-26    PT/INR - ( 26 Apr 2022 15:25 )   PT: 13.1 sec;   INR: 1.10 ratio         PTT - ( 26 Apr 2022 15:25 )  PTT:32.7 sec    diet as tolerated  pt being transferred to [facility] in order to f/u with GI who placed stent

## 2022-04-28 NOTE — H&P ADULT - NSHPPHYSICALEXAM_GEN_ALL_CORE
Vital signs reviewed.  CONSTITUTIONAL: NAD, awake.  Cachectic  HEAD: Normocephalic; atraumatic  EYES: EOMI, no conjunctival injection, no scleral icterus  MOUTH/THROAT:  MMM  NECK: Trachea midline  CV: Normal S1, S2; no audible murmurs; extremities WWP  RESP: normal work of breathing; CTAB, no stridor  ABD: soft, non-distended; non-tender  : Deferred  MSK/EXT: no edema, no limited ROM  SKIN: No rashes on exposed skin surfaces  NEURO: Moves all extremities spontaneously with no focal deficits, speech is appropriate

## 2022-04-28 NOTE — H&P ADULT - HISTORY OF PRESENT ILLNESS
61M with pmh alcohol abuse, chronic pancreatitis, chronic esophagitis, CKD4 who presents, transferred from outside hospital with hematemesis and YUNIEL on CKD.  Transferred to [facility] due to history of esophageal stricture with recent stent placement with [facility] on 4/22, accepted by Dr. Hinds for advanced GI evaluation and possible intervention.    Patient with ongoing bleeding at [place], with hgb drop 4/27, s/p 2u pRBCs prior to transfer. 61M with pmh alcohol abuse, chronic pancreatitis, chronic esophagitis, CKD4 who presents, transferred from outside hospital with hematemesis and YUNIEL on CKD.  Transferred to [facility] due to history of esophageal stricture with recent stent placement with [facility] on 4/22, accepted by Dr. Hinds for advanced GI evaluation and possible intervention. Pt currently reporting severe 10/10 abdominal pain, denies further episodes of vomiting or hematemesis. Reports he has been abstinent from alcohol for >1 year.     Patient with ongoing bleeding at [place], with hgb drop 4/27, s/p 2u pRBCs prior to transfer.

## 2022-04-28 NOTE — DISCHARGE NOTE PROVIDER - HOSPITAL COURSE
61M with PMHx of alcohol abuse, chronic pancreatitis, chronic esophagitis, and CKD4 presenting for hematemesis. Patient recently admitted to [facility] and found to have esophageal stricture with stent placement. EGD showing mural thickening of distal esophagus. Imaging showing coarse calcifications throughout pancreas and possible obstructing stone in pancreatic duct. MRI abdomen confirmed this, but advanced GI deferred intervention given stricture & inability to pass instrument down for ERCP. Patient otherwise controlled with opioids on that admission and discharged and told to follow up outpatient with pain specialist.      1) Hematemesis/ Esophageal Stricture s/p stent placement  - patient transfused 2U PRBC, post transfusion CBC stable  - transfer to [facility] to hospitalist service w/ Dr. Hinds GI to follow up as consultant  - c/w Protonix, Carafate  2) Chronic Pancreatitis/Stone  - pain control

## 2022-04-28 NOTE — DISCHARGE NOTE PROVIDER - NSDCFUSCHEDAPPT_GEN_ALL_CORE_FT
Gene Fraga  [facility]  Nephro OP [id number]  Scheduled Appointment: 05/04/2022    [facility]  Gastro OP [id number]  Scheduled Appointment: 06/23/2022     Trevin Raza  [facility]  [facility] PreAdmits  Scheduled Appointment: 04/28/2022    Gene Fraga  [facility] Physician [facility]  Nephro OP [id number]  Scheduled Appointment: 05/04/2022    [facility] Physician [facility]  Gastro OP [id number]  Scheduled Appointment: 06/23/2022

## 2022-04-28 NOTE — CONSULT NOTE ADULT - ATTENDING COMMENTS
Patient seen and examined with the GI fellow. I agree with the above assessment and plan. Thank you for allowing us to care for your patient.    Plan for EGD evaluation tomorrow

## 2022-04-28 NOTE — H&P ADULT - PROBLEM SELECTOR PLAN 7
dvt ppx: chemoppx deferred due to active bleeding, venodynes  GI ppx: PPI BID due to GI bleed  diet: CLD, NPO @ midnight    Transitions of Care Status:  1.  Name of PCP: Lamberto Hurtado  2.  PCP Contacted on Admission: [x ] Y    [ ] N    3.  PCP contacted at Discharge: [ ] Y    [ ] N    [ ] N/A  4.  Post-Discharge Appointment Date and Location:  5.  Summary of Handoff given to PCP:

## 2022-04-28 NOTE — H&P ADULT - PROBLEM SELECTOR PLAN 6
dvt ppx: chemoppx deferred due to active bleeding, venodynes  GI ppx: PPI BID due to GI bleed  diet: CLD, NPO @ midnight    Transitions of Care Status:  1.  Name of PCP: Lamberto Hurtado  2.  PCP Contacted on Admission: [x ] Y    [ ] N    3.  PCP contacted at Discharge: [ ] Y    [ ] N    [ ] N/A  4.  Post-Discharge Appointment Date and Location:  5.  Summary of Handoff given to PCP: -pt reports being sober for >1 year  -no evidence of alcohol w/d on exam, will monitor   -c/w thiamine and folic acid

## 2022-04-28 NOTE — H&P ADULT - PROBLEM SELECTOR PLAN 3
- creon  - pain control; home regimen oxycodone 10mg q4h (or oxy-acetaminophen 10/325 q4h).  ISTOP in chart, patient follows with Dr. Hurtado within [facility]. - c/w reon  - pain control; home regimen oxycodone 10mg q4h (or oxy-acetaminophen 10/325 q4h).  ISTOP in chart, patient follows with Dr. Hurtado within [facility].

## 2022-04-29 ENCOUNTER — TRANSCRIPTION ENCOUNTER (OUTPATIENT)
Age: 62
End: 2022-04-29

## 2022-04-29 LAB
A1C WITH ESTIMATED AVERAGE GLUCOSE RESULT: 5.5 % — SIGNIFICANT CHANGE UP (ref 4–5.6)
ALBUMIN SERPL ELPH-MCNC: 2.9 G/DL — LOW (ref 3.3–5)
ALP SERPL-CCNC: 73 U/L — SIGNIFICANT CHANGE UP (ref 40–120)
ALT FLD-CCNC: 6 U/L — SIGNIFICANT CHANGE UP (ref 4–41)
ANION GAP SERPL CALC-SCNC: 10 MMOL/L — SIGNIFICANT CHANGE UP (ref 7–14)
APTT BLD: 30.9 SEC — SIGNIFICANT CHANGE UP (ref 27–36.3)
AST SERPL-CCNC: 7 U/L — SIGNIFICANT CHANGE UP (ref 4–40)
BASOPHILS # BLD AUTO: 0.07 K/UL — SIGNIFICANT CHANGE UP (ref 0–0.2)
BASOPHILS NFR BLD AUTO: 0.8 % — SIGNIFICANT CHANGE UP (ref 0–2)
BILIRUB SERPL-MCNC: 0.2 MG/DL — SIGNIFICANT CHANGE UP (ref 0.2–1.2)
BLD GP AB SCN SERPL QL: NEGATIVE — SIGNIFICANT CHANGE UP
BUN SERPL-MCNC: 35 MG/DL — HIGH (ref 7–23)
CALCIUM SERPL-MCNC: 8.5 MG/DL — SIGNIFICANT CHANGE UP (ref 8.4–10.5)
CHLORIDE SERPL-SCNC: 109 MMOL/L — HIGH (ref 98–107)
CO2 SERPL-SCNC: 19 MMOL/L — LOW (ref 22–31)
CREAT SERPL-MCNC: 2.67 MG/DL — HIGH (ref 0.5–1.3)
EGFR: 26 ML/MIN/1.73M2 — LOW
EOSINOPHIL # BLD AUTO: 0.36 K/UL — SIGNIFICANT CHANGE UP (ref 0–0.5)
EOSINOPHIL NFR BLD AUTO: 4.3 % — SIGNIFICANT CHANGE UP (ref 0–6)
ESTIMATED AVERAGE GLUCOSE: 111 — SIGNIFICANT CHANGE UP
GLUCOSE SERPL-MCNC: 106 MG/DL — HIGH (ref 70–99)
HCT VFR BLD CALC: 29.7 % — LOW (ref 39–50)
HCT VFR BLD CALC: 34.4 % — LOW (ref 39–50)
HGB BLD-MCNC: 11 G/DL — LOW (ref 13–17)
HGB BLD-MCNC: 9.7 G/DL — LOW (ref 13–17)
IANC: 6.06 K/UL — SIGNIFICANT CHANGE UP (ref 1.8–7.4)
IMM GRANULOCYTES NFR BLD AUTO: 0.4 % — SIGNIFICANT CHANGE UP (ref 0–1.5)
INR BLD: 0.98 RATIO — SIGNIFICANT CHANGE UP (ref 0.88–1.16)
LYMPHOCYTES # BLD AUTO: 1.57 K/UL — SIGNIFICANT CHANGE UP (ref 1–3.3)
LYMPHOCYTES # BLD AUTO: 18.5 % — SIGNIFICANT CHANGE UP (ref 13–44)
MCHC RBC-ENTMCNC: 28.5 PG — SIGNIFICANT CHANGE UP (ref 27–34)
MCHC RBC-ENTMCNC: 29 PG — SIGNIFICANT CHANGE UP (ref 27–34)
MCHC RBC-ENTMCNC: 32 GM/DL — SIGNIFICANT CHANGE UP (ref 32–36)
MCHC RBC-ENTMCNC: 32.7 GM/DL — SIGNIFICANT CHANGE UP (ref 32–36)
MCV RBC AUTO: 88.7 FL — SIGNIFICANT CHANGE UP (ref 80–100)
MCV RBC AUTO: 89.1 FL — SIGNIFICANT CHANGE UP (ref 80–100)
MONOCYTES # BLD AUTO: 0.38 K/UL — SIGNIFICANT CHANGE UP (ref 0–0.9)
MONOCYTES NFR BLD AUTO: 4.5 % — SIGNIFICANT CHANGE UP (ref 2–14)
NEUTROPHILS # BLD AUTO: 6.06 K/UL — SIGNIFICANT CHANGE UP (ref 1.8–7.4)
NEUTROPHILS NFR BLD AUTO: 71.5 % — SIGNIFICANT CHANGE UP (ref 43–77)
NRBC # BLD: 0 /100 WBCS — SIGNIFICANT CHANGE UP
NRBC # BLD: 0 /100 WBCS — SIGNIFICANT CHANGE UP
NRBC # FLD: 0 K/UL — SIGNIFICANT CHANGE UP
NRBC # FLD: 0 K/UL — SIGNIFICANT CHANGE UP
PLATELET # BLD AUTO: 297 K/UL — SIGNIFICANT CHANGE UP (ref 150–400)
PLATELET # BLD AUTO: 354 K/UL — SIGNIFICANT CHANGE UP (ref 150–400)
POTASSIUM SERPL-MCNC: 3.9 MMOL/L — SIGNIFICANT CHANGE UP (ref 3.5–5.3)
POTASSIUM SERPL-SCNC: 3.9 MMOL/L — SIGNIFICANT CHANGE UP (ref 3.5–5.3)
PROT SERPL-MCNC: 5.4 G/DL — LOW (ref 6–8.3)
PROTHROM AB SERPL-ACNC: 11.4 SEC — SIGNIFICANT CHANGE UP (ref 10.5–13.4)
RBC # BLD: 3.35 M/UL — LOW (ref 4.2–5.8)
RBC # BLD: 3.86 M/UL — LOW (ref 4.2–5.8)
RBC # FLD: 18.7 % — HIGH (ref 10.3–14.5)
RBC # FLD: 18.8 % — HIGH (ref 10.3–14.5)
RH IG SCN BLD-IMP: POSITIVE — SIGNIFICANT CHANGE UP
SODIUM SERPL-SCNC: 138 MMOL/L — SIGNIFICANT CHANGE UP (ref 135–145)
WBC # BLD: 15.17 K/UL — HIGH (ref 3.8–10.5)
WBC # BLD: 8.47 K/UL — SIGNIFICANT CHANGE UP (ref 3.8–10.5)
WBC # FLD AUTO: 15.17 K/UL — HIGH (ref 3.8–10.5)
WBC # FLD AUTO: 8.47 K/UL — SIGNIFICANT CHANGE UP (ref 3.8–10.5)

## 2022-04-29 PROCEDURE — 99232 SBSQ HOSP IP/OBS MODERATE 35: CPT | Mod: GC,25

## 2022-04-29 PROCEDURE — 99233 SBSQ HOSP IP/OBS HIGH 50: CPT | Mod: GC

## 2022-04-29 PROCEDURE — 43249 ESOPH EGD DILATION <30 MM: CPT | Mod: 1L

## 2022-04-29 DEVICE — CATH BLLN CRE 10-12MMX5.5CM: Type: IMPLANTABLE DEVICE | Status: FUNCTIONAL

## 2022-04-29 DEVICE — CATH BLLN CRE 8-10MMX5.5CM: Type: IMPLANTABLE DEVICE | Status: FUNCTIONAL

## 2022-04-29 RX ORDER — OXYCODONE AND ACETAMINOPHEN 5; 325 MG/1; MG/1
2 TABLET ORAL ONCE
Refills: 0 | Status: DISCONTINUED | OUTPATIENT
Start: 2022-04-29 | End: 2022-04-29

## 2022-04-29 RX ADMIN — OXYCODONE HYDROCHLORIDE 10 MILLIGRAM(S): 5 TABLET ORAL at 03:15

## 2022-04-29 RX ADMIN — Medication 10 MILLIGRAM(S): at 22:02

## 2022-04-29 RX ADMIN — Medication 2 CAPSULE(S): at 18:51

## 2022-04-29 RX ADMIN — Medication 1 GRAM(S): at 18:52

## 2022-04-29 RX ADMIN — OXYCODONE HYDROCHLORIDE 10 MILLIGRAM(S): 5 TABLET ORAL at 09:10

## 2022-04-29 RX ADMIN — OXYCODONE AND ACETAMINOPHEN 2 TABLET(S): 5; 325 TABLET ORAL at 22:37

## 2022-04-29 RX ADMIN — Medication 100 MILLIGRAM(S): at 11:43

## 2022-04-29 RX ADMIN — OXYCODONE HYDROCHLORIDE 10 MILLIGRAM(S): 5 TABLET ORAL at 19:20

## 2022-04-29 RX ADMIN — OXYCODONE HYDROCHLORIDE 10 MILLIGRAM(S): 5 TABLET ORAL at 18:51

## 2022-04-29 RX ADMIN — Medication 10 MILLIGRAM(S): at 11:40

## 2022-04-29 RX ADMIN — Medication 1 MILLIGRAM(S): at 11:40

## 2022-04-29 RX ADMIN — PANTOPRAZOLE SODIUM 40 MILLIGRAM(S): 20 TABLET, DELAYED RELEASE ORAL at 11:40

## 2022-04-29 RX ADMIN — Medication 10 MILLIGRAM(S): at 03:17

## 2022-04-29 RX ADMIN — OXYCODONE AND ACETAMINOPHEN 2 TABLET(S): 5; 325 TABLET ORAL at 23:07

## 2022-04-29 RX ADMIN — OXYCODONE HYDROCHLORIDE 10 MILLIGRAM(S): 5 TABLET ORAL at 09:40

## 2022-04-29 RX ADMIN — OXYCODONE HYDROCHLORIDE 10 MILLIGRAM(S): 5 TABLET ORAL at 02:45

## 2022-04-29 NOTE — DISCHARGE NOTE PROVIDER - CARE PROVIDER_API CALL
Lamberto Hurtado)  Internal Medicine  [phone] [place] [address]  Phone: [phone]  Fax: [phone]  Follow Up Time:

## 2022-04-29 NOTE — DISCHARGE NOTE PROVIDER - NSDCCPTREATMENT_GEN_ALL_CORE_FT
PRINCIPAL PROCEDURE  Procedure: EGD  Findings and Treatment: Findings:  One benign-appearing, intrinsic severe (stenosis; an endoscope cannot pass) stenosis was found 35 to 42 cm from the incisors.  This stenosis measured 7 mm (inner diameter) x 7 cm (in length).  The mucosa at the level of stenosis was very friable. The stenosis was traversed after downsizing scope.   Distal to the stricture, a fully covered metal stent was seen from the distal esophagus (starting at 43 cm) into the stomach.   The stomach and duodenum showed no source of bleeding.   The upper endoscope was re-inserted. A CRE balloon was used to dilate the stricture to 12 mm.   The upper endoscope was then able to pass to the level of the proximal stent. A raptor was used to grasp the stent and pull it back across the stricture, starting at 35 cm. There was significant sloughing of the esophageal mucosa during this.  The stent was then sutured into place using an Apollo suturing device.

## 2022-04-29 NOTE — DISCHARGE NOTE PROVIDER - NSDCMRMEDTOKEN_GEN_ALL_CORE_FT
bisacodyl 10 mg rectal suppository: 1 suppository(ies) rectal once a day  chlorproMAZINE 10 mg oral tablet: 1 tab(s) orally every 8 hours, As needed, hiccuping MDD:30mg  cyclobenzaprine 5 mg oral tablet: 1 tab(s) orally 3 times a day, As needed, hiccuping MDD:15mg  folic acid 1 mg oral tablet: 1 tab(s) orally once a day  gabapentin 250 mg/5 mL oral solution: 4 milliliter(s) orally once a day (at bedtime) MDD:200mg  HYDROmorphone: 0.5 milligram(s) intravenous every 4 hours (5 times/day)  lactulose 10 g/15 mL oral syrup: 30 milliliter(s) orally once a day as needed for constipation  oxyCODONE 10 mg oral tablet: 1 tab(s) orally every 4 hours, As Needed -for severe pain MDD:6 tablets   pancrelipase 6000 units-19,000 units-30,000 units oral delayed release capsule: 2 cap(s) orally 3 times a day (with meals)  pantoprazole 40 mg oral delayed release tablet: 1 tab(s) orally 2 times a day   polyethylene glycol 3350 oral powder for reconstitution: 17 gram(s) orally 2 times a day  senna oral tablet: 2 tab(s) orally once a day (at bedtime)  sucralfate 1 g oral tablet: 1 tab(s) orally every 6 hours   tamsulosin 0.4 mg oral capsule: 1 cap(s) orally once a day (at bedtime)  thiamine 100 mg oral tablet: 1 tab(s) orally once a day   bisacodyl 5 mg oral delayed release tablet: 1 tab(s) orally once a day (at bedtime)  chlorproMAZINE 10 mg oral tablet: 1 tab(s) orally every 8 hours, As needed, hiccuping MDD:30mg  cyclobenzaprine 5 mg oral tablet: 1 tab(s) orally 3 times a day, As needed, hiccuping MDD:15mg  folic acid 1 mg oral tablet: 1 tab(s) orally once a day  gabapentin 250 mg/5 mL oral solution: 4 milliliter(s) orally once a day (at bedtime) MDD:200mg  HYDROmorphone: 0.5 milligram(s) intravenous every 4 hours (5 times/day)  lactulose 10 g/15 mL oral syrup: 30 milliliter(s) orally once a day as needed for constipation  pancrelipase 6000 units-19,000 units-30,000 units oral delayed release capsule: 2 cap(s) orally 3 times a day (with meals)  pantoprazole 40 mg oral delayed release tablet: 1 tab(s) orally every 12 hours  polyethylene glycol 3350 oral powder for reconstitution: 17 gram(s) orally 2 times a day  senna oral tablet: 2 tab(s) orally once a day (at bedtime)  sucralfate 1 g oral tablet: 1 tab(s) orally every 6 hours   tamsulosin 0.4 mg oral capsule: 1 cap(s) orally once a day (at bedtime)  thiamine 100 mg oral tablet: 1 tab(s) orally once a day

## 2022-04-29 NOTE — PROGRESS NOTE ADULT - ATTENDING COMMENTS
61M with pmh alcohol abuse, chronic pancreatitis, chronic esophagitis, CKD4 who presents, transferred from outside hospital with hematemesis and YUNIEL on CKD. Pt with drop in hgb requiring 2 unit transfusion at OSH. On CT imaging, Mural thickening of the distal esophagus and gastroesophageal junction with interval placement of an esophageal/GE junction stent concerning for ulceration 2/2 stent. Pt denies any further episodes of hematemesis, melena or BRBPR. Continue with PPI BID, carafate, plan for EGD today for further eval. Trend CBC qd for now, monitor VS. Pt also reports chronic pain 2/2 pancreatitis. Treat with oxycodone (home med) with Dilaudid for severe pain. YUNIEL on CKD 2/2 acute blood loss, Cr improving back to baseline. Pt also noted with chronic hiccups, c/w gabapentin and Thorazine. Rest of care as above .

## 2022-04-29 NOTE — DISCHARGE NOTE PROVIDER - NSDCFUSCHEDAPPT_GEN_ALL_CORE_FT
Gene Fraga  [facility]  Nephro OP [id number]  Scheduled Appointment: 05/04/2022    [facility]  Gastro OP [id number]  Scheduled Appointment: 06/23/2022

## 2022-04-29 NOTE — DISCHARGE NOTE PROVIDER - HOSPITAL COURSE
61M with pmh alcohol abuse, chronic pancreatitis, chronic esophagitis, CKD4 who presents, transferred from outside hospital with hematemesis and YUNIEL on CKD.  Transferred to [facility] due to history of esophageal stricture with recent stent placement with [facility]enter[facility] on 4/22, accepted by Dr. Hinds for advanced GI evaluation and possible intervention. Pt currently reporting severe 10/10 abdominal pain, denies further episodes of vomiting or hematemesis. Reports he has been abstinent from alcohol for >1 year.     Patient with ongoing bleeding at [place], with hgb drop 4/27, s/p 2u pRBCs prior to transfer. 61M with pmh alcohol abuse, chronic pancreatitis, chronic esophagitis, CKD4 who presents, transferred from outside hospital with hematemesis and YUNIEL on CKD.  Transferred to [facility] due to history of esophageal stricture with recent stent placement with [facility]enter[facility] on 4/22, accepted by Dr. Hinds for advanced GI evaluation and possible intervention. Pt currently reporting severe 10/10 abdominal pain, denies further episodes of vomiting or hematemesis. Reports he has been abstinent from alcohol for >1 year.     Patient with ongoing bleeding at [place], with hgb drop 4/27, s/p 2u pRBCs prior to transfer. On Admission he underwent urgent EGD which showed esophageal stricture s/p CRE balloon dilation, migration of esophageal stent s/p stent relocation over stricture with suturing to keep stent in place. He was started on IV protonix which was discontinued after 3 days and transitioned to 40mg PO. He continued to remain stable without further emesis and his diet was slowly advanced as tolerated. He was eventually deemed medically optimized for D/C with outpatient GI followup for esophageal stent removal.

## 2022-04-29 NOTE — CHART NOTE - NSCHARTNOTEFT_GEN_A_CORE
Findings:  One benign-appearing, intrinsic severe (stenosis; an endoscope cannot pass) stenosis was found 35 to 42 cm from the incisors.  This stenosis measured 7 mm (inner diameter) x 7 cm (in length).  The mucosa at the level of stenosis was very friable. The stenosis was traversed after downsizing scope.   Distal to the stricture, a fully covered metal stent was seen from the distal esophagus (starting at 43 cm) into the stomach.   The stomach and duodenum showed no source of bleeding.   The upper endoscope was re-inserted. A CRE balloon was used to dilate the stricture to 12 mm.   The upper endoscope was then able to pass to the level of the proximal stent. A raptor was used to grasp the stent and pull it back across the stricture, starting at 35 cm. There was significant sloughing of the esophageal mucosa during this.  The stent was then sutured into place using an Apollo suturing device.    Recommendations:  - [facility]   - Use a proton pump inhibitor IV BID for 3 days.   - Clear liquid diet today. Can advance to pureed tomorrow if feeling well.  - Pain control per primary team.
Patient Name: Eitan Chaudhari               YOB: 1960  Address: [address] [place], [place] [address]Uem: Male  Rx Written	Rx Dispensed	Drug	Quantity	Days Supply	Prescriber Name	Prescriber Ana #	Payment Method	Dispenser  04/23/2022	04/25/2022	oxycodone hcl (ir) 10 mg tab	42	7	[facility]	[id number]	Insurance	[facility]  03/25/2022	04/01/2022	oxycodone-acetaminophen 5-325 mg tab	112	14	HurtadoLamberto	[id number]	Medicaid	[id number]  03/09/2022	03/18/2022	oxycodone-acetaminophen 5-325 mg tab	112	14	HurtadoLamberto	[id number]	Medicaid	[id number]  02/25/2022	03/05/2022	oxycontin er 15 mg tablet	42	21	Hurtado Lamberto	[id number]	Medicaid	[id number]  02/09/2022	02/12/2022	oxycodone-acetaminophen 5-325 mg tab	84	21	[facility] Lamberto	[id number]	Insurance	[facility] #[id number]  02/02/2022	02/02/2022	oxycodone-acetaminophen 5-325 mg tablet	40	5	Layne Elizondo	[id number]	Medicaid	[facility]  01/08/2022	01/08/2022	oxycodone-acetaminophen 5-325 mg tab	40	10	Katelynn Erazo	[id number]	[id number]        -Juan Pablo Pulliam M.D.   PGY-3 EM/IM   Pager #[phone]

## 2022-04-29 NOTE — PROGRESS NOTE ADULT - SUBJECTIVE AND OBJECTIVE BOX
***incomplete PROGRESS NOTE:     Patient is a 61y old  Male who presents with a chief complaint of Hematemesis (29 Apr 2022 09:08)      SUBJECTIVE / OVERNIGHT EVENTS:  No overnight events.  Refused some medications overnight.  Complained about clear liquid diet, requested diet advancement (declined per GI instructions).    MEDICATIONS  (STANDING):  folic acid 1 milliGRAM(s) Oral daily  gabapentin Solution 200 milliGRAM(s) Oral at bedtime  pancrelipase  (CREON  6,000 Lipase Units) 2 Capsule(s) Oral three times a day with meals  pantoprazole  Injectable 40 milliGRAM(s) IV Push daily  polyethylene glycol 3350 17 Gram(s) Oral two times a day  senna 2 Tablet(s) Oral at bedtime  sucralfate 1 Gram(s) Oral two times a day  tamsulosin 0.4 milliGRAM(s) Oral at bedtime  thiamine 100 milliGRAM(s) Oral daily    MEDICATIONS  (PRN):  acetaminophen     Tablet .. 650 milliGRAM(s) Oral every 6 hours PRN Temp greater or equal to 38C (100.4F), Mild Pain (1 - 3)  chlorproMAZINE    Tablet 10 milliGRAM(s) Oral every 8 hours PRN hiccuping  cyclobenzaprine 5 milliGRAM(s) Oral three times a day PRN Muscle Spasm  HYDROmorphone  Injectable 0.5 milliGRAM(s) IV Push every 4 hours PRN Severe Pain (7 - 10)  melatonin 3 milliGRAM(s) Oral at bedtime PRN Insomnia  ondansetron Injectable 4 milliGRAM(s) IV Push every 8 hours PRN Nausea and/or Vomiting  oxyCODONE    IR 10 milliGRAM(s) Oral every 6 hours PRN Moderate Pain (4 - 6)      CAPILLARY BLOOD GLUCOSE        I&O's Summary    28 Apr 2022 07:01  -  29 Apr 2022 07:00  --------------------------------------------------------  IN: 700 mL / OUT: 0 mL / NET: 700 mL        PHYSICAL EXAM:  Vital Signs Last 24 Hrs  T(C): 37.2 (29 Apr 2022 05:33), Max: 37.2 (29 Apr 2022 05:33)  T(F): 98.9 (29 Apr 2022 05:33), Max: 98.9 (29 Apr 2022 05:33)  HR: 70 (29 Apr 2022 05:33) (70 - 75)  BP: 135/79 (29 Apr 2022 05:33) (116/72 - 152/70)  BP(mean): --  RR: 18 (29 Apr 2022 05:33) (17 - 18)  SpO2: 100% (29 Apr 2022 05:33) (96% - 100%)    CONSTITUTIONAL: NAD, cachectic, chronically ill appearing. A&Ox3 to person, place, time.  Hiccuping throughout exam.  RESPIRATORY: Normal respiratory effort; lungs are clear to auscultation bilaterally  CARDIOVASCULAR: Regular rate and rhythm, normal S1 and S2, no murmur/rub/gallop; No lower extremity edema; Peripheral pulses are 2+ bilaterally  ABDOMEN: Nontender to palpation, no rebound/guarding; No hepatosplenomegaly  MUSCLOSKELETAL: no clubbing or cyanosis of digits; no joint swelling or tenderness to palpation  NEURO: CN 2-12 grossly intact, moves all limbs spontaneously      LABS:                        9.7    8.47  )-----------( 297      ( 29 Apr 2022 05:36 )             29.7     04-29    138  |  109<H>  |  35<H>  ----------------------------<  106<H>  3.9   |  19<L>  |  2.67<H>    Ca    8.5      29 Apr 2022 05:36  Phos  3.0     04-28  Mg     2.2     04-28    TPro  5.4<L>  /  Alb  2.9<L>  /  TBili  0.2  /  DBili  x   /  AST  7   /  ALT  6   /  AlkPhos  73  04-29    PT/INR - ( 29 Apr 2022 05:36 )   PT: 11.4 sec;   INR: 0.98 ratio    PTT - ( 29 Apr 2022 05:36 )  PTT:30.9 sec      RADIOLOGY & ADDITIONAL TESTS:  Results Reviewed: y  Imaging Personally Reviewed:  Electrocardiogram Personally Reviewed:    COORDINATION OF CARE:  Care Discussed with Consultants/Other Providers [Y/N]:  Prior or Outpatient Records Reviewed [Y/N]:

## 2022-04-29 NOTE — PROGRESS NOTE ADULT - ASSESSMENT
61M with pmh alcohol abuse (sober x1 year), chronic pancreatitis, chronic esophagitis with stricture (s/p stent 4/19/22), CKD4 who presents transferred from [place] for advanced GI evaluation due to abdominal pain and hematemesis on 4/26.

## 2022-04-29 NOTE — PROGRESS NOTE ADULT - PROBLEM SELECTOR PLAN 3
- c/w creon  - pain control; home regimen oxycodone 10mg q4h (or oxy-acetaminophen 10/325 q4h).  ISTOP in chart, patient follows with Dr. Hurtado within [facility].

## 2022-04-29 NOTE — PROGRESS NOTE ADULT - PROBLEM SELECTOR PLAN 6
-pt reports being sober for >1 year  -no evidence of alcohol w/d on exam, will monitor   -c/w thiamine and folic acid

## 2022-04-29 NOTE — DISCHARGE NOTE PROVIDER - NSDCFUADDAPPT_GEN_ALL_CORE_FT
APPOINTMENT DATE:  THURSDAY MAY 12 AT 9:00AM    Please follow up with Gastroenter[facility] at [phone] for further evaluation and planning for removal of your esophagal stent.

## 2022-04-30 DIAGNOSIS — E43 UNSPECIFIED SEVERE PROTEIN-CALORIE MALNUTRITION: ICD-10-CM

## 2022-04-30 DIAGNOSIS — N18.4 CHRONIC KIDNEY DISEASE, STAGE 4 (SEVERE): ICD-10-CM

## 2022-04-30 LAB
ALBUMIN SERPL ELPH-MCNC: 3 G/DL — LOW (ref 3.3–5)
ALP SERPL-CCNC: 85 U/L — SIGNIFICANT CHANGE UP (ref 40–120)
ALT FLD-CCNC: 5 U/L — SIGNIFICANT CHANGE UP (ref 4–41)
ANION GAP SERPL CALC-SCNC: 12 MMOL/L — SIGNIFICANT CHANGE UP (ref 7–14)
APTT BLD: 31.4 SEC — SIGNIFICANT CHANGE UP (ref 27–36.3)
AST SERPL-CCNC: 11 U/L — SIGNIFICANT CHANGE UP (ref 4–40)
BASOPHILS # BLD AUTO: 0.08 K/UL — SIGNIFICANT CHANGE UP (ref 0–0.2)
BASOPHILS NFR BLD AUTO: 0.4 % — SIGNIFICANT CHANGE UP (ref 0–2)
BILIRUB SERPL-MCNC: 0.3 MG/DL — SIGNIFICANT CHANGE UP (ref 0.2–1.2)
BUN SERPL-MCNC: 29 MG/DL — HIGH (ref 7–23)
CALCIUM SERPL-MCNC: 8.6 MG/DL — SIGNIFICANT CHANGE UP (ref 8.4–10.5)
CHLORIDE SERPL-SCNC: 107 MMOL/L — SIGNIFICANT CHANGE UP (ref 98–107)
CO2 SERPL-SCNC: 15 MMOL/L — LOW (ref 22–31)
CREAT SERPL-MCNC: 2.48 MG/DL — HIGH (ref 0.5–1.3)
EGFR: 29 ML/MIN/1.73M2 — LOW
EOSINOPHIL # BLD AUTO: 0 K/UL — SIGNIFICANT CHANGE UP (ref 0–0.5)
EOSINOPHIL NFR BLD AUTO: 0 % — SIGNIFICANT CHANGE UP (ref 0–6)
GLUCOSE SERPL-MCNC: 104 MG/DL — HIGH (ref 70–99)
HCT VFR BLD CALC: 33.4 % — LOW (ref 39–50)
HCT VFR BLD CALC: 35 % — LOW (ref 39–50)
HGB BLD-MCNC: 11 G/DL — LOW (ref 13–17)
HGB BLD-MCNC: 11.3 G/DL — LOW (ref 13–17)
IANC: 17.24 K/UL — HIGH (ref 1.8–7.4)
IMM GRANULOCYTES NFR BLD AUTO: 0.4 % — SIGNIFICANT CHANGE UP (ref 0–1.5)
INR BLD: 0.96 RATIO — SIGNIFICANT CHANGE UP (ref 0.88–1.16)
LYMPHOCYTES # BLD AUTO: 1.08 K/UL — SIGNIFICANT CHANGE UP (ref 1–3.3)
LYMPHOCYTES # BLD AUTO: 5.7 % — LOW (ref 13–44)
MAGNESIUM SERPL-MCNC: 1.9 MG/DL — SIGNIFICANT CHANGE UP (ref 1.6–2.6)
MCHC RBC-ENTMCNC: 28.6 PG — SIGNIFICANT CHANGE UP (ref 27–34)
MCHC RBC-ENTMCNC: 28.9 PG — SIGNIFICANT CHANGE UP (ref 27–34)
MCHC RBC-ENTMCNC: 32.3 GM/DL — SIGNIFICANT CHANGE UP (ref 32–36)
MCHC RBC-ENTMCNC: 32.9 GM/DL — SIGNIFICANT CHANGE UP (ref 32–36)
MCV RBC AUTO: 86.8 FL — SIGNIFICANT CHANGE UP (ref 80–100)
MCV RBC AUTO: 89.5 FL — SIGNIFICANT CHANGE UP (ref 80–100)
MONOCYTES # BLD AUTO: 0.61 K/UL — SIGNIFICANT CHANGE UP (ref 0–0.9)
MONOCYTES NFR BLD AUTO: 3.2 % — SIGNIFICANT CHANGE UP (ref 2–14)
NEUTROPHILS # BLD AUTO: 17.24 K/UL — HIGH (ref 1.8–7.4)
NEUTROPHILS NFR BLD AUTO: 90.3 % — HIGH (ref 43–77)
NRBC # BLD: 0 /100 WBCS — SIGNIFICANT CHANGE UP
NRBC # BLD: 0 /100 WBCS — SIGNIFICANT CHANGE UP
NRBC # FLD: 0 K/UL — SIGNIFICANT CHANGE UP
NRBC # FLD: 0 K/UL — SIGNIFICANT CHANGE UP
PHOSPHATE SERPL-MCNC: 3.1 MG/DL — SIGNIFICANT CHANGE UP (ref 2.5–4.5)
PLATELET # BLD AUTO: 327 K/UL — SIGNIFICANT CHANGE UP (ref 150–400)
PLATELET # BLD AUTO: 348 K/UL — SIGNIFICANT CHANGE UP (ref 150–400)
POTASSIUM SERPL-MCNC: 4 MMOL/L — SIGNIFICANT CHANGE UP (ref 3.5–5.3)
POTASSIUM SERPL-SCNC: 4 MMOL/L — SIGNIFICANT CHANGE UP (ref 3.5–5.3)
PROT SERPL-MCNC: 6.1 G/DL — SIGNIFICANT CHANGE UP (ref 6–8.3)
PROTHROM AB SERPL-ACNC: 11.1 SEC — SIGNIFICANT CHANGE UP (ref 10.5–13.4)
RBC # BLD: 3.85 M/UL — LOW (ref 4.2–5.8)
RBC # BLD: 3.91 M/UL — LOW (ref 4.2–5.8)
RBC # FLD: 17.8 % — HIGH (ref 10.3–14.5)
RBC # FLD: 18.4 % — HIGH (ref 10.3–14.5)
SODIUM SERPL-SCNC: 134 MMOL/L — LOW (ref 135–145)
WBC # BLD: 19.09 K/UL — HIGH (ref 3.8–10.5)
WBC # BLD: 20.63 K/UL — HIGH (ref 3.8–10.5)
WBC # FLD AUTO: 19.09 K/UL — HIGH (ref 3.8–10.5)
WBC # FLD AUTO: 20.63 K/UL — HIGH (ref 3.8–10.5)

## 2022-04-30 PROCEDURE — 99232 SBSQ HOSP IP/OBS MODERATE 35: CPT | Mod: GC

## 2022-04-30 PROCEDURE — 71045 X-RAY EXAM CHEST 1 VIEW: CPT | Mod: 26

## 2022-04-30 PROCEDURE — 99233 SBSQ HOSP IP/OBS HIGH 50: CPT | Mod: GC

## 2022-04-30 RX ORDER — OXYCODONE AND ACETAMINOPHEN 5; 325 MG/1; MG/1
2 TABLET ORAL EVERY 4 HOURS
Refills: 0 | Status: DISCONTINUED | OUTPATIENT
Start: 2022-04-30 | End: 2022-05-03

## 2022-04-30 RX ORDER — SUCRALFATE 1 G
1 TABLET ORAL
Refills: 0 | Status: DISCONTINUED | OUTPATIENT
Start: 2022-04-30 | End: 2022-05-03

## 2022-04-30 RX ORDER — OXYCODONE AND ACETAMINOPHEN 5; 325 MG/1; MG/1
1 TABLET ORAL ONCE
Refills: 0 | Status: DISCONTINUED | OUTPATIENT
Start: 2022-04-30 | End: 2022-04-30

## 2022-04-30 RX ADMIN — OXYCODONE AND ACETAMINOPHEN 2 TABLET(S): 5; 325 TABLET ORAL at 21:49

## 2022-04-30 RX ADMIN — OXYCODONE AND ACETAMINOPHEN 1 TABLET(S): 5; 325 TABLET ORAL at 03:02

## 2022-04-30 RX ADMIN — Medication 1 GRAM(S): at 05:34

## 2022-04-30 RX ADMIN — OXYCODONE AND ACETAMINOPHEN 2 TABLET(S): 5; 325 TABLET ORAL at 17:43

## 2022-04-30 RX ADMIN — TAMSULOSIN HYDROCHLORIDE 0.4 MILLIGRAM(S): 0.4 CAPSULE ORAL at 21:47

## 2022-04-30 RX ADMIN — PANTOPRAZOLE SODIUM 40 MILLIGRAM(S): 20 TABLET, DELAYED RELEASE ORAL at 12:24

## 2022-04-30 RX ADMIN — SENNA PLUS 2 TABLET(S): 8.6 TABLET ORAL at 21:44

## 2022-04-30 RX ADMIN — GABAPENTIN 200 MILLIGRAM(S): 400 CAPSULE ORAL at 21:45

## 2022-04-30 RX ADMIN — Medication 10 MILLIGRAM(S): at 17:44

## 2022-04-30 RX ADMIN — OXYCODONE AND ACETAMINOPHEN 2 TABLET(S): 5; 325 TABLET ORAL at 13:10

## 2022-04-30 RX ADMIN — OXYCODONE AND ACETAMINOPHEN 2 TABLET(S): 5; 325 TABLET ORAL at 22:49

## 2022-04-30 RX ADMIN — OXYCODONE AND ACETAMINOPHEN 2 TABLET(S): 5; 325 TABLET ORAL at 13:50

## 2022-04-30 RX ADMIN — OXYCODONE AND ACETAMINOPHEN 2 TABLET(S): 5; 325 TABLET ORAL at 10:00

## 2022-04-30 RX ADMIN — Medication 2 CAPSULE(S): at 17:44

## 2022-04-30 RX ADMIN — Medication 1 GRAM(S): at 17:50

## 2022-04-30 RX ADMIN — OXYCODONE AND ACETAMINOPHEN 2 TABLET(S): 5; 325 TABLET ORAL at 18:30

## 2022-04-30 RX ADMIN — Medication 2 CAPSULE(S): at 09:17

## 2022-04-30 RX ADMIN — Medication 2 CAPSULE(S): at 13:11

## 2022-04-30 RX ADMIN — HYDROMORPHONE HYDROCHLORIDE 0.5 MILLIGRAM(S): 2 INJECTION INTRAMUSCULAR; INTRAVENOUS; SUBCUTANEOUS at 04:56

## 2022-04-30 RX ADMIN — OXYCODONE AND ACETAMINOPHEN 1 TABLET(S): 5; 325 TABLET ORAL at 02:32

## 2022-04-30 RX ADMIN — OXYCODONE AND ACETAMINOPHEN 2 TABLET(S): 5; 325 TABLET ORAL at 09:17

## 2022-04-30 RX ADMIN — CYCLOBENZAPRINE HYDROCHLORIDE 5 MILLIGRAM(S): 10 TABLET, FILM COATED ORAL at 21:48

## 2022-04-30 RX ADMIN — HYDROMORPHONE HYDROCHLORIDE 0.5 MILLIGRAM(S): 2 INJECTION INTRAMUSCULAR; INTRAVENOUS; SUBCUTANEOUS at 04:41

## 2022-04-30 NOTE — PROGRESS NOTE ADULT - ASSESSMENT
Impression:  #Hematemesis: CT with some thickening near EG junction, could be in setting of ulceration 2/2 stent. Stent look in place.   #Esophageal stricture w/ dysphagia -S/p EGD w/ stent placement 4/19 (fully covered WallFlex stent, 18 mm x 103 mm); s/p EGD 4/29 showing esophagea stricture s/p CRE balloon dilation, migration of esophageal stent s/p stent relocation over stricture with suturing to keep stent in place.  #Abd pain - likely 2/2 chronic pancreatitis. Unrelated to stricture. Possible neuropathic component.  #HCV s/p treatment (neg viral load)      Recommendations:  - Use a proton pump inhibitor IV BID for 3 days (4/30-5/2).   - Clear liquid diet, can advance to pureed if feeling well.  - Pain control per primary team Impression:  #Hematemesis: CT with some thickening near EG junction, could be in setting of ulceration 2/2 stent. Stent look in place.   #Esophageal stricture w/ dysphagia -S/p EGD w/ stent placement 4/19 (fully covered WallFlex stent, 18 mm x 103 mm); s/p EGD 4/29 showing esophagea stricture s/p CRE balloon dilation, migration of esophageal stent s/p stent relocation over stricture with suturing to keep stent in place.  #Abd pain - likely 2/2 chronic pancreatitis. Unrelated to stricture. Possible neuropathic component.  #HCV s/p treatment (neg viral load)      Recommendations:  - Use a proton pump inhibitor IV BID for 3 days (4/30-5/2).   - Clear liquid diet, can advance to pureed if feeling well.  - Pain control per primary team    **THIS NOTE IS NOT FINALIZED UNTIL SIGNED BY THE ATTENDING**    Romelia Black MD  GI Fellow, PGY-4  Available via [employer] Teams    NON-URGENT CONSULTS:  Please email [email or web address]  AT NIGHT AND ON WEEKENDS:  Contact on-call GI fellow via answering service ([phone]) from 5pm-8am and on weekends/holidays  MONDAY-FRIDAY 8AM-5PM:  Pager# [phone] ([facility]) or [phone] ([facility])  GI Phone# [phone] ([facility])

## 2022-04-30 NOTE — PHYSICAL THERAPY INITIAL EVALUATION ADULT - PERTINENT HX OF CURRENT PROBLEM, REHAB EVAL
61 year old Male with PMH alcohol abuse (sober x1 year), chronic pancreatitis, chronic esophagitis with stricture (s/p stent 4/19/22), CKD4 who presents transferred from [place] for advanced GI evaluation due to abdominal pain and hematemesis on 4/26.

## 2022-04-30 NOTE — PROGRESS NOTE ADULT - SUBJECTIVE AND OBJECTIVE BOX
***incomplete PROGRESS NOTE:     Patient is a 61y old  Male who presents with a chief complaint of Hematemesis (30 Apr 2022 06:57)      SUBJECTIVE / OVERNIGHT EVENTS:  No acute overnight events.  Patient refused several medications including bowel regimen.  Denied black or bloody stools.  No nausea/vomiting, tolerated clear diet, agreeable with advancing to [facility].  Made several requests for pain medication including a switch from oxycodone to percocet which he says works better for him.  Otherwise no complaints.    MEDICATIONS  (STANDING):  folic acid 1 milliGRAM(s) Oral daily  gabapentin Solution 200 milliGRAM(s) Oral at bedtime  pancrelipase  (CREON  6,000 Lipase Units) 2 Capsule(s) Oral three times a day with meals  pantoprazole  Injectable 40 milliGRAM(s) IV Push daily  polyethylene glycol 3350 17 Gram(s) Oral two times a day  senna 2 Tablet(s) Oral at bedtime  sucralfate suspension 1 Gram(s) Oral two times a day  tamsulosin 0.4 milliGRAM(s) Oral at bedtime  thiamine 100 milliGRAM(s) Oral daily    MEDICATIONS  (PRN):  acetaminophen     Tablet .. 650 milliGRAM(s) Oral every 6 hours PRN Temp greater or equal to 38C (100.4F), Mild Pain (1 - 3)  chlorproMAZINE    Tablet 10 milliGRAM(s) Oral every 8 hours PRN hiccuping  cyclobenzaprine 5 milliGRAM(s) Oral three times a day PRN Muscle Spasm  HYDROmorphone  Injectable 0.5 milliGRAM(s) IV Push every 4 hours PRN Severe Pain (7 - 10)  melatonin 3 milliGRAM(s) Oral at bedtime PRN Insomnia  ondansetron Injectable 4 milliGRAM(s) IV Push every 8 hours PRN Nausea and/or Vomiting  oxycodone    5 mG/acetaminophen 325 mG 2 Tablet(s) Oral every 4 hours PRN Moderate Pain (4 - 6)      CAPILLARY BLOOD GLUCOSE        I&O's Summary    30 Apr 2022 07:01  -  30 Apr 2022 10:17  --------------------------------------------------------  IN: 350 mL / OUT: 400 mL / NET: -50 mL        PHYSICAL EXAM:  Vital Signs Last 24 Hrs  T(C): 36.9 (30 Apr 2022 05:18), Max: 37 (29 Apr 2022 21:36)  T(F): 98.4 (30 Apr 2022 05:18), Max: 98.6 (29 Apr 2022 21:36)  HR: 82 (30 Apr 2022 05:18) (72 - 93)  BP: 152/77 (30 Apr 2022 05:18) (134/75 - 183/97)  BP(mean): --  RR: 16 (30 Apr 2022 05:18) (10 - 20)  SpO2: 98% (30 Apr 2022 05:18) (97% - 100%)    CONSTITUTIONAL: NAD, cachectic, chronically ill appearing. A&Ox3 to person, place, time.  Hiccuping throughout exam.  RESPIRATORY: Normal respiratory effort; lungs are clear to auscultation bilaterally  CARDIOVASCULAR: Regular rate and rhythm, normal S1 and S2, no murmur/rub/gallop; No lower extremity edema; Peripheral pulses are 2+ bilaterally  ABDOMEN: Nontender to palpation, no rebound/guarding; No hepatosplenomegaly  MUSCLOSKELETAL: no clubbing or cyanosis of digits; no joint swelling or tenderness to palpation  NEURO: CN 2-12 grossly intact, moves all limbs spontaneously      LABS:                        11.3   19.09 )-----------( 348      ( 30 Apr 2022 07:23 )             35.0     04-30    134<L>  |  107  |  29<H>  ----------------------------<  104<H>  4.0   |  15<L>  |  2.48<H>    Ca    8.6      30 Apr 2022 07:23  Phos  3.1     04-30  Mg     1.90     04-30    TPro  6.1  /  Alb  3.0<L>  /  TBili  0.3  /  DBili  x   /  AST  11  /  ALT  5   /  AlkPhos  85  04-30    PT/INR - ( 30 Apr 2022 07:23 )   PT: 11.1 sec;   INR: 0.96 ratio    PTT - ( 30 Apr 2022 07:23 )  PTT:31.4 sec      RADIOLOGY & ADDITIONAL TESTS:  Results Reviewed: y  Imaging Personally Reviewed:  Electrocardiogram Personally Reviewed:    COORDINATION OF CARE:  Care Discussed with Consultants/Other Providers [Y/N]: y - GI  Prior or Outpatient Records Reviewed [Y/N]:

## 2022-04-30 NOTE — PROGRESS NOTE ADULT - ATTENDING COMMENTS
Agree with above. No complaints of N/V. Reports mild epigastric pain. s/p EGD esophageal dilation/stent reposition. No bleeding noted. PPI tx. Clear  liquid diet and advance to pureed foods as tolerated.

## 2022-04-30 NOTE — PROGRESS NOTE ADULT - PROBLEM SELECTOR PLAN 3
- c/w creon  - pain control; home regimen oxycodone 10mg q4h (or oxy-acetaminophen 10/325 q4h).  ISTOP in chart, patient follows with Dr. Hurtado within [facility]. - c/w creon  - pain control; home regimen oxycodone 10mg q4h (or oxy-acetaminophen 10/325 q4h).  ISTOP in chart, patient follows with Dr. Hurtado within [facility].  - History of constipation requiring disimpaction due to refusal of bowel regimen.  Encouraged use of bowel regimen today to prevent repeated need for disimpaction; patient continued to refuse bowel regimen.

## 2022-04-30 NOTE — PROGRESS NOTE ADULT - ASSESSMENT
61M with pmh alcohol abuse (sober x1 year), chronic pancreatitis, chronic esophagitis with stricture (s/p stent 4/19/22), CKD4 who presents transferred from [place] for advanced GI evaluation due to abdominal pain and hematemesis on 4/26.

## 2022-04-30 NOTE — PROGRESS NOTE ADULT - ATTENDING COMMENTS
61y/M PMH of alcohol abuse, chronic pancreatitis, chronic esophagitis, CKD4 who presents, transferred from outside hospital with hematemesis and YUNIEL on CKD.   # Acute blood los Anemia likely secondary to UGIB   s/p 2 units transfusion at OSH.  Pt denies any further episodes of hematemesis, melena or BRBPR  S/p EGD 4/19  w/ stent placement    Repeat  EGD 4/29 showing esophagea stricture s/p CRE balloon dilation, migration of esophageal stent s/p stent relocation over stricture with suturing to keep stent in place.  CT imaging  showing mural thickening of the distal esophagus and gastroesophageal junction with interval placement of an esophageal/GE junction stent concerning for ulceration 2/2 stent   Continue with Protonix, Carafate. Team dw GI to continue IV Protonix till 5/2, Ok to advance diet   Monitor CBC qd for now, monitor VS.    #Chronic pain 2/2 pancreatitis- On home med Oxycodone (home med) with Dilaudid for severe pain. On Creon  # YUNIEL on CKD 3 2/2 acute blood loss, Cr improving back to baseline.   #Pt also noted with chronic hiccups, c/w gabapentin and Thorazine. Rest of care as above .

## 2022-04-30 NOTE — PHYSICAL THERAPY INITIAL EVALUATION ADULT - ADDITIONAL COMMENTS
Patient lives in a private house with his mother, +4 steps to enter the house and +1 flight to reach second floor bedroom. Patient uses a rollator for ambulation and transfers and endorses needing assistance with ADLs but does not have any.     Patient left semi-supine in bed, call bell within reach, bed alarm set, in NAD. CHECO keller.

## 2022-04-30 NOTE — PROGRESS NOTE ADULT - PROBLEM SELECTOR PLAN 1
- no further episodes hematemesis  - PPI BID  - trend CBC q12hr  - CLD until EGD per GI - no further episodes hematemesis  - PPI BID  - trend CBC q12hr

## 2022-04-30 NOTE — PROGRESS NOTE ADULT - PROBLEM SELECTOR PLAN 5
- active type and screen  - 2 18g or larger IVs active  - PPI BID  - given ceftriaxone at VS, can hold for now  - GI consulted, plan for EGD in AM - Cr baseline 2.6  - At baseline now, avoid nephrotoxic agents

## 2022-04-30 NOTE — PROGRESS NOTE ADULT - PROBLEM SELECTOR PLAN 6
-pt reports being sober for >1 year  -no evidence of alcohol w/d on exam, will monitor   -c/w thiamine and folic acid - active type and screen  - 2 18g or larger IVs active  - PPI BID  - given ceftriaxone at VS  - s/p EGD with stricture ballooning

## 2022-04-30 NOTE — PHYSICAL THERAPY INITIAL EVALUATION ADULT - GENERAL OBSERVATIONS, REHAB EVAL
Patient received semi-supine in bed, A+Ox4, comfortable and in NAD. Patient agreeable to participate in PT Evaluation.

## 2022-04-30 NOTE — PROGRESS NOTE ADULT - PROBLEM SELECTOR PLAN 2
- recent stent 4/19  - plan for EGD today  - PPI BID, NPO until EGD - recent stent 4/19  - ballooning of stricture 4/29 and re-positioning causing some sloughing of esophagus  - IV pantoprazole BID through 5/2  - pureed diet with protein supplements

## 2022-04-30 NOTE — PROGRESS NOTE ADULT - SUBJECTIVE AND OBJECTIVE BOX
Chief Complaint:  Patient is a 61y old  Male who presents with a chief complaint of Hematemesis (29 Apr 2022 15:14)      Interval Events: Reports feeling well. Denies any N/V/D/C, abd pain, melena or hematochezia.   s/p EGD 4/29 showing esophagea stricture s/p CRE balloon dilation, migration of esophageal stent s/p stent relocation over stricture with suturing to keep stent in place.    Hospital Medications:  acetaminophen     Tablet .. 650 milliGRAM(s) Oral every 6 hours PRN  chlorproMAZINE    Tablet 10 milliGRAM(s) Oral every 8 hours PRN  cyclobenzaprine 5 milliGRAM(s) Oral three times a day PRN  folic acid 1 milliGRAM(s) Oral daily  gabapentin Solution 200 milliGRAM(s) Oral at bedtime  HYDROmorphone  Injectable 0.5 milliGRAM(s) IV Push every 4 hours PRN  melatonin 3 milliGRAM(s) Oral at bedtime PRN  ondansetron Injectable 4 milliGRAM(s) IV Push every 8 hours PRN  oxyCODONE    IR 10 milliGRAM(s) Oral every 6 hours PRN  pancrelipase  (CREON  6,000 Lipase Units) 2 Capsule(s) Oral three times a day with meals  pantoprazole  Injectable 40 milliGRAM(s) IV Push daily  polyethylene glycol 3350 17 Gram(s) Oral two times a day  senna 2 Tablet(s) Oral at bedtime  sucralfate suspension 1 Gram(s) Oral two times a day  tamsulosin 0.4 milliGRAM(s) Oral at bedtime  thiamine 100 milliGRAM(s) Oral daily      PMHX/PSHX:  ETOH abuse    Pancreatitis    Hepatitis C    Gout    No significant past surgical history    Gastric perforation            ROS: 14 point ROS negative unless otherwise stated in subjective      PHYSICAL EXAM:     GENERAL:  Well developed, no distress  HEENT:  NC/AT,  conjunctivae clear, sclera anicteric  CHEST:  Full & symmetric excursion, no increased effort w/ respirations  HEART:  Regular rhythm & rate  ABDOMEN:  Soft, non-tender, non-distended  EXTREMITIES:  no LE  edema  SKIN:  No rash/erythema/ecchymoses/petechiae/wounds/jaundice  NEURO:  Alert, oriented    Vital Signs:  Vital Signs Last 24 Hrs  T(C): 36.9 (30 Apr 2022 05:18), Max: 37 (29 Apr 2022 21:36)  T(F): 98.4 (30 Apr 2022 05:18), Max: 98.6 (29 Apr 2022 21:36)  HR: 82 (30 Apr 2022 05:18) (72 - 93)  BP: 152/77 (30 Apr 2022 05:18) (134/75 - 183/97)  BP(mean): --  RR: 16 (30 Apr 2022 05:18) (10 - 20)  SpO2: 98% (30 Apr 2022 05:18) (97% - 100%)  Daily     Daily     LABS:                        11.0   15.17 )-----------( 354      ( 29 Apr 2022 21:18 )             34.4     04-29    138  |  109<H>  |  35<H>  ----------------------------<  106<H>  3.9   |  19<L>  |  2.67<H>    Ca    8.5      29 Apr 2022 05:36  Phos  3.0     04-28  Mg     2.2     04-28    TPro  5.4<L>  /  Alb  2.9<L>  /  TBili  0.2  /  DBili  x   /  AST  7   /  ALT  6   /  AlkPhos  73  04-29    LIVER FUNCTIONS - ( 29 Apr 2022 05:36 )  Alb: 2.9 g/dL / Pro: 5.4 g/dL / ALK PHOS: 73 U/L / ALT: 6 U/L / AST: 7 U/L / GGT: x           PT/INR - ( 29 Apr 2022 05:36 )   PT: 11.4 sec;   INR: 0.98 ratio         PTT - ( 29 Apr 2022 05:36 )  PTT:30.9 sec        Imaging:    Findings:  One benign-appearing, intrinsic severe (stenosis; an endoscope cannot pass) stenosis was found 35 to 42 cm from the incisors.  This stenosis measured 7 mm (inner diameter) x 7 cm (in length).  The mucosa at the level of stenosis was very friable. The stenosis was traversed after downsizing scope.   Distal to the stricture, a fully covered metal stent was seen from the distal esophagus (starting at 43 cm) into the stomach.   The stomach and duodenum showed no source of bleeding.   The upper endoscope was re-inserted. A CRE balloon was used to dilate the stricture to 12 mm.   The upper endoscope was then able to pass to the level of the proximal stent. A raptor was used to grasp the stent and pull it back across the stricture, starting at 35 cm. There was significant sloughing of the esophageal mucosa during this.  The stent was then sutured into place using an Apollo suturing device.               Chief Complaint:  Patient is a 61y old  Male who presents with a chief complaint of Hematemesis (29 Apr 2022 15:14)      Interval Events: Reports mild epigastric abd pain but o/w feeling well. Denies any N/V/D/C, melena or hematochezia.   s/p EGD 4/29 showing esophagea stricture s/p CRE balloon dilation, migration of esophageal stent s/p stent relocation over stricture with suturing to keep stent in place.    Hospital Medications:  acetaminophen     Tablet .. 650 milliGRAM(s) Oral every 6 hours PRN  chlorproMAZINE    Tablet 10 milliGRAM(s) Oral every 8 hours PRN  cyclobenzaprine 5 milliGRAM(s) Oral three times a day PRN  folic acid 1 milliGRAM(s) Oral daily  gabapentin Solution 200 milliGRAM(s) Oral at bedtime  HYDROmorphone  Injectable 0.5 milliGRAM(s) IV Push every 4 hours PRN  melatonin 3 milliGRAM(s) Oral at bedtime PRN  ondansetron Injectable 4 milliGRAM(s) IV Push every 8 hours PRN  oxyCODONE    IR 10 milliGRAM(s) Oral every 6 hours PRN  pancrelipase  (CREON  6,000 Lipase Units) 2 Capsule(s) Oral three times a day with meals  pantoprazole  Injectable 40 milliGRAM(s) IV Push daily  polyethylene glycol 3350 17 Gram(s) Oral two times a day  senna 2 Tablet(s) Oral at bedtime  sucralfate suspension 1 Gram(s) Oral two times a day  tamsulosin 0.4 milliGRAM(s) Oral at bedtime  thiamine 100 milliGRAM(s) Oral daily      PMHX/PSHX:  ETOH abuse    Pancreatitis    Hepatitis C    Gout    No significant past surgical history    Gastric perforation            ROS: 14 point ROS negative unless otherwise stated in subjective      PHYSICAL EXAM:     GENERAL:  Well developed, no distress  HEENT:  NC/AT,  conjunctivae clear, sclera anicteric  CHEST:  Full & symmetric excursion, no increased effort w/ respirations  HEART:  Regular rhythm & rate  ABDOMEN:  Soft, +mild epigastric tenderness, non-distended  EXTREMITIES:  no LE  edema  SKIN:  No rash/erythema/ecchymoses/petechiae/wounds/jaundice  NEURO:  Alert, orientedx3    Vital Signs:  Vital Signs Last 24 Hrs  T(C): 36.9 (30 Apr 2022 05:18), Max: 37 (29 Apr 2022 21:36)  T(F): 98.4 (30 Apr 2022 05:18), Max: 98.6 (29 Apr 2022 21:36)  HR: 82 (30 Apr 2022 05:18) (72 - 93)  BP: 152/77 (30 Apr 2022 05:18) (134/75 - 183/97)  BP(mean): --  RR: 16 (30 Apr 2022 05:18) (10 - 20)  SpO2: 98% (30 Apr 2022 05:18) (97% - 100%)  Daily     Daily     LABS:                        11.0   15.17 )-----------( 354      ( 29 Apr 2022 21:18 )             34.4     04-29    138  |  109<H>  |  35<H>  ----------------------------<  106<H>  3.9   |  19<L>  |  2.67<H>    Ca    8.5      29 Apr 2022 05:36  Phos  3.0     04-28  Mg     2.2     04-28    TPro  5.4<L>  /  Alb  2.9<L>  /  TBili  0.2  /  DBili  x   /  AST  7   /  ALT  6   /  AlkPhos  73  04-29    LIVER FUNCTIONS - ( 29 Apr 2022 05:36 )  Alb: 2.9 g/dL / Pro: 5.4 g/dL / ALK PHOS: 73 U/L / ALT: 6 U/L / AST: 7 U/L / GGT: x           PT/INR - ( 29 Apr 2022 05:36 )   PT: 11.4 sec;   INR: 0.98 ratio         PTT - ( 29 Apr 2022 05:36 )  PTT:30.9 sec        Imaging:    Findings:  One benign-appearing, intrinsic severe (stenosis; an endoscope cannot pass) stenosis was found 35 to 42 cm from the incisors.  This stenosis measured 7 mm (inner diameter) x 7 cm (in length).  The mucosa at the level of stenosis was very friable. The stenosis was traversed after downsizing scope.   Distal to the stricture, a fully covered metal stent was seen from the distal esophagus (starting at 43 cm) into the stomach.   The stomach and duodenum showed no source of bleeding.   The upper endoscope was re-inserted. A CRE balloon was used to dilate the stricture to 12 mm.   The upper endoscope was then able to pass to the level of the proximal stent. A raptor was used to grasp the stent and pull it back across the stricture, starting at 35 cm. There was significant sloughing of the esophageal mucosa during this.  The stent was then sutured into place using an Apollo suturing device.

## 2022-04-30 NOTE — PROGRESS NOTE ADULT - PROBLEM SELECTOR PLAN 4
- Cr baseline 2.6  - seems to be at baseline now - Assessed by Dietician on 4/20/22 at recent hospitalization  - Continue those recommendations of ensure enlive 1 can TID with meals

## 2022-05-01 PROCEDURE — 99233 SBSQ HOSP IP/OBS HIGH 50: CPT | Mod: GC

## 2022-05-01 RX ORDER — SODIUM CHLORIDE 9 MG/ML
1000 INJECTION INTRAMUSCULAR; INTRAVENOUS; SUBCUTANEOUS
Refills: 0 | Status: DISCONTINUED | OUTPATIENT
Start: 2022-05-01 | End: 2022-05-03

## 2022-05-01 RX ADMIN — Medication 10 MILLIGRAM(S): at 05:41

## 2022-05-01 RX ADMIN — OXYCODONE AND ACETAMINOPHEN 2 TABLET(S): 5; 325 TABLET ORAL at 12:13

## 2022-05-01 RX ADMIN — Medication 2 CAPSULE(S): at 09:00

## 2022-05-01 RX ADMIN — OXYCODONE AND ACETAMINOPHEN 2 TABLET(S): 5; 325 TABLET ORAL at 01:29

## 2022-05-01 RX ADMIN — SODIUM CHLORIDE 85 MILLILITER(S): 9 INJECTION INTRAMUSCULAR; INTRAVENOUS; SUBCUTANEOUS at 11:21

## 2022-05-01 RX ADMIN — OXYCODONE AND ACETAMINOPHEN 2 TABLET(S): 5; 325 TABLET ORAL at 15:38

## 2022-05-01 RX ADMIN — OXYCODONE AND ACETAMINOPHEN 2 TABLET(S): 5; 325 TABLET ORAL at 02:29

## 2022-05-01 RX ADMIN — Medication 1 GRAM(S): at 05:36

## 2022-05-01 RX ADMIN — OXYCODONE AND ACETAMINOPHEN 2 TABLET(S): 5; 325 TABLET ORAL at 05:39

## 2022-05-01 RX ADMIN — PANTOPRAZOLE SODIUM 40 MILLIGRAM(S): 20 TABLET, DELAYED RELEASE ORAL at 11:21

## 2022-05-01 RX ADMIN — OXYCODONE AND ACETAMINOPHEN 2 TABLET(S): 5; 325 TABLET ORAL at 16:40

## 2022-05-01 RX ADMIN — HYDROMORPHONE HYDROCHLORIDE 0.5 MILLIGRAM(S): 2 INJECTION INTRAMUSCULAR; INTRAVENOUS; SUBCUTANEOUS at 20:43

## 2022-05-01 RX ADMIN — Medication 10 MILLIGRAM(S): at 22:01

## 2022-05-01 RX ADMIN — OXYCODONE AND ACETAMINOPHEN 2 TABLET(S): 5; 325 TABLET ORAL at 11:24

## 2022-05-01 RX ADMIN — HYDROMORPHONE HYDROCHLORIDE 0.5 MILLIGRAM(S): 2 INJECTION INTRAMUSCULAR; INTRAVENOUS; SUBCUTANEOUS at 20:28

## 2022-05-01 RX ADMIN — OXYCODONE AND ACETAMINOPHEN 2 TABLET(S): 5; 325 TABLET ORAL at 06:39

## 2022-05-01 RX ADMIN — POLYETHYLENE GLYCOL 3350 17 GRAM(S): 17 POWDER, FOR SOLUTION ORAL at 05:33

## 2022-05-01 NOTE — PROGRESS NOTE ADULT - PROBLEM SELECTOR PLAN 1
- no further episodes hematemesis  - PPI BID  - trend CBC q12hr - no further episodes hematemesis  - PPI BID  - trend CBC q12hr  - gentle hydration, appears dry  - WBCs uptrending, will repeat CBC if continues to uptrend ID workup

## 2022-05-01 NOTE — PROGRESS NOTE ADULT - PROBLEM SELECTOR PLAN 4
- Assessed by Dietician on 4/20/22 at recent hospitalization  - Continue those recommendations of ensure enlive 1 can TID with meals

## 2022-05-01 NOTE — PROGRESS NOTE ADULT - SUBJECTIVE AND OBJECTIVE BOX
PROGRESS NOTE:   Authored by Dr. Venkata Kendall    Patient is a 61y old  Male who presents with a chief complaint of Hematemesis (30 Apr 2022 06:57)      SUBJECTIVE / OVERNIGHT EVENTS: No overnight event. In progress.    ADDITIONAL REVIEW OF SYSTEMS:    MEDICATIONS  (STANDING):  bisacodyl 5 milliGRAM(s) Oral at bedtime  folic acid 1 milliGRAM(s) Oral daily  gabapentin Solution 200 milliGRAM(s) Oral at bedtime  pancrelipase  (CREON  6,000 Lipase Units) 2 Capsule(s) Oral three times a day with meals  pantoprazole  Injectable 40 milliGRAM(s) IV Push daily  polyethylene glycol 3350 17 Gram(s) Oral two times a day  senna 2 Tablet(s) Oral at bedtime  sucralfate suspension 1 Gram(s) Oral two times a day  tamsulosin 0.4 milliGRAM(s) Oral at bedtime  thiamine 100 milliGRAM(s) Oral daily    MEDICATIONS  (PRN):  acetaminophen     Tablet .. 650 milliGRAM(s) Oral every 6 hours PRN Temp greater or equal to 38C (100.4F), Mild Pain (1 - 3)  chlorproMAZINE    Tablet 10 milliGRAM(s) Oral every 8 hours PRN hiccuping  cyclobenzaprine 5 milliGRAM(s) Oral three times a day PRN Muscle Spasm  HYDROmorphone  Injectable 0.5 milliGRAM(s) IV Push every 4 hours PRN Severe Pain (7 - 10)  melatonin 3 milliGRAM(s) Oral at bedtime PRN Insomnia  ondansetron Injectable 4 milliGRAM(s) IV Push every 8 hours PRN Nausea and/or Vomiting  oxycodone    5 mG/acetaminophen 325 mG 2 Tablet(s) Oral every 4 hours PRN Moderate Pain (4 - 6)      CAPILLARY BLOOD GLUCOSE        I&O's Summary    30 Apr 2022 07:01  -  01 May 2022 07:00  --------------------------------------------------------  IN: 450 mL / OUT: 1350 mL / NET: -900 mL        PHYSICAL EXAM:  Vital Signs Last 24 Hrs  T(C): 36.9 (01 May 2022 05:41), Max: 37.2 (30 Apr 2022 14:20)  T(F): 98.5 (01 May 2022 05:41), Max: 99 (30 Apr 2022 14:20)  HR: 84 (01 May 2022 05:41) (80 - 84)  BP: 151/76 (01 May 2022 05:41) (151/76 - 165/88)  BP(mean): --  RR: 19 (01 May 2022 05:41) (18 - 19)  SpO2: 98% (01 May 2022 05:41) (98% - 100%)    GENERAL: NAD, lying comfortably in bed  HEAD: Atraumatic, normocephalic  EYES: EOMI b/l PERRLA b/l, conjunctiva and sclera clear  NECK: Supple, No JVD, No LAD  RESPIRATORY: Normal respiratory effort; lungs are clear to auscultation bilaterally  CARDIOVASCULAR: Regular rate and rhythm, normal S1 and S2, no murmur/rub/gallop; No lower extremity edema  ABDOMEN: Nontender, normoactive bowel sounds, no rebound/guarding; No hepatosplenomegaly  MUSCULOSKELETAL: no clubbing or cyanosis of digits; no joint swelling or tenderness to palpation  NEURO: Non focal   PSYCH: A+O to person, place, and time; affect appropriate    LABS:                        11.0   20.63 )-----------( 327      ( 30 Apr 2022 18:57 )             33.4     04-30    134<L>  |  107  |  29<H>  ----------------------------<  104<H>  4.0   |  15<L>  |  2.48<H>    Ca    8.6      30 Apr 2022 07:23  Phos  3.1     04-30  Mg     1.90     04-30    TPro  6.1  /  Alb  3.0<L>  /  TBili  0.3  /  DBili  x   /  AST  11  /  ALT  5   /  AlkPhos  85  04-30    PT/INR - ( 30 Apr 2022 07:23 )   PT: 11.1 sec;   INR: 0.96 ratio         PTT - ( 30 Apr 2022 07:23 )  PTT:31.4 sec           PROGRESS NOTE:   Authored by Dr. Venkata Kendall    Patient is a 61y old  Male who presents with a chief complaint of Hematemesis (30 Apr 2022 06:57)      SUBJECTIVE / OVERNIGHT EVENTS: No overnight event. Patient reported feeling "ok". Denied hematochezia/melena, hematemesis, SOB, abdominal pain, chest pain, n/v/d. Reported improved appetite.    ADDITIONAL REVIEW OF SYSTEMS:  Review of Systems:  Constitutional: No fever, No weight loss, good appetite/po intake  Head: No headache   Eyes: No blurry vision, No diplopia  Neuro: No tremors, No muscle weakness   Cardiovascular: No chest pain, No palpitations  Respiratory: No SOB, No cough  GI: No nausea, No vomiting, No diarrhea  : No dysuria, No hematuria  Skin: No rash  MSK: No joint pain   Psych: No depression      MEDICATIONS  (STANDING):  bisacodyl 5 milliGRAM(s) Oral at bedtime  folic acid 1 milliGRAM(s) Oral daily  gabapentin Solution 200 milliGRAM(s) Oral at bedtime  pancrelipase  (CREON  6,000 Lipase Units) 2 Capsule(s) Oral three times a day with meals  pantoprazole  Injectable 40 milliGRAM(s) IV Push daily  polyethylene glycol 3350 17 Gram(s) Oral two times a day  senna 2 Tablet(s) Oral at bedtime  sucralfate suspension 1 Gram(s) Oral two times a day  tamsulosin 0.4 milliGRAM(s) Oral at bedtime  thiamine 100 milliGRAM(s) Oral daily    MEDICATIONS  (PRN):  acetaminophen     Tablet .. 650 milliGRAM(s) Oral every 6 hours PRN Temp greater or equal to 38C (100.4F), Mild Pain (1 - 3)  chlorproMAZINE    Tablet 10 milliGRAM(s) Oral every 8 hours PRN hiccuping  cyclobenzaprine 5 milliGRAM(s) Oral three times a day PRN Muscle Spasm  HYDROmorphone  Injectable 0.5 milliGRAM(s) IV Push every 4 hours PRN Severe Pain (7 - 10)  melatonin 3 milliGRAM(s) Oral at bedtime PRN Insomnia  ondansetron Injectable 4 milliGRAM(s) IV Push every 8 hours PRN Nausea and/or Vomiting  oxycodone    5 mG/acetaminophen 325 mG 2 Tablet(s) Oral every 4 hours PRN Moderate Pain (4 - 6)      CAPILLARY BLOOD GLUCOSE        I&O's Summary    30 Apr 2022 07:01  -  01 May 2022 07:00  --------------------------------------------------------  IN: 450 mL / OUT: 1350 mL / NET: -900 mL        PHYSICAL EXAM:  Vital Signs Last 24 Hrs  T(C): 36.9 (01 May 2022 05:41), Max: 37.2 (30 Apr 2022 14:20)  T(F): 98.5 (01 May 2022 05:41), Max: 99 (30 Apr 2022 14:20)  HR: 84 (01 May 2022 05:41) (80 - 84)  BP: 151/76 (01 May 2022 05:41) (151/76 - 165/88)  BP(mean): --  RR: 19 (01 May 2022 05:41) (18 - 19)  SpO2: 98% (01 May 2022 05:41) (98% - 100%)    ONSTITUTIONAL: NAD, cachectic, chronically ill appearing. A&Ox3 to person, place, time.   RESPIRATORY: Normal respiratory effort; lungs are clear to auscultation bilaterally  CARDIOVASCULAR: Regular rate and rhythm, normal S1 and S2, no murmur/rub/gallop; No lower extremity edema; Peripheral pulses are 2+ bilaterally  ABDOMEN: Nontender to palpation, no rebound/guarding; No hepatosplenomegaly  MUSCLOSKELETAL: no clubbing or cyanosis of digits; no joint swelling or tenderness to palpation  NEURO: CN 2-12 grossly intact, moves all limbs spontaneously      LABS:                        11.0   20.63 )-----------( 327      ( 30 Apr 2022 18:57 )             33.4     04-30    134<L>  |  107  |  29<H>  ----------------------------<  104<H>  4.0   |  15<L>  |  2.48<H>    Ca    8.6      30 Apr 2022 07:23  Phos  3.1     04-30  Mg     1.90     04-30    TPro  6.1  /  Alb  3.0<L>  /  TBili  0.3  /  DBili  x   /  AST  11  /  ALT  5   /  AlkPhos  85  04-30    PT/INR - ( 30 Apr 2022 07:23 )   PT: 11.1 sec;   INR: 0.96 ratio         PTT - ( 30 Apr 2022 07:23 )  PTT:31.4 sec

## 2022-05-01 NOTE — PROGRESS NOTE ADULT - ATTENDING COMMENTS
61y/M PMH of alcohol abuse, chronic pancreatitis, chronic esophagitis, CKD4 who presents, transferred from outside hospital with hematemesis and YUNIEL on CKD.     # Acute blood los Anemia likely secondary to UGIB   s/p 2 units transfusion at OSH.  Pt denies any further episodes of hematemesis, melena or BRBPR  S/p EGD 4/19  w/ stent placement    Repeat  EGD 4/29 showing esophagea stricture s/p CRE balloon dilation, migration of esophageal stent s/p stent relocation over stricture with suturing to keep stent in place.  CT imaging  showing mural thickening of the distal esophagus and gastroesophageal junction with interval placement of an esophageal/GE junction stent concerning for ulceration 2/2 stent   Continue with Protonix, Carafate. Team dw GI to continue IV Protonix till 5/2, Ok to advance diet   Monitor CBC qd for now, monitor VS.    #Chronic pain 2/2 pancreatitis- On home med Oxycodone (home med) with Dilaudid for severe pain. On Creon  # YUNIEL on CKD 3 2/2 acute blood loss, Cr improving back to baseline.   #Pt also noted with chronic hiccups, c/w gabapentin and Thorazine. Rest of care as above . 61y/M PMH of alcohol abuse, chronic pancreatitis, chronic esophagitis, CKD4 who presents, transferred from outside hospital with hematemesis and YUNIEL on CKD.     # Acute blood los Anemia likely secondary to UGIB.  s/p 2 units transfusion at OSH.  Pt denies any further episodes of hematemesis, melena or BRBPR  S/p EGD 4/19  w/ stent placement    Repeat  EGD 4/29 showing esophagea stricture s/p CRE balloon dilation, migration of esophageal stent s/p stent relocation over stricture with suturing to keep stent in place.  CT imaging  showing mural thickening of the distal esophagus and gastroesophageal junction with interval placement of an esophageal/GE junction stent concerning for ulceration 2/2 stent   Continue with Protonix, Carafate. Team dw GI to continue IV Protonix till 5/2, Ok to advance diet   Monitor CBC qd for now, monitor VS.      # Leukocytosis-WBC from 4/30 19-20K, was previously 15 K  No clinical signs of infection- No fevers, Denying History of urinary symptoms. Chest Xray 4/30- clear. No recent steroid use   ? Reactive as previously also with intermittent leukocytosis  FU labs from this AM     #Chronic pain 2/2 pancreatitis- On home med Oxycodone (home med) with Dilaudid for severe pain. On Creon    # YUNIEL on CKD 3 2/2 acute blood loss, Cr improving back to baseline.   #Pt also noted with chronic hiccups, c/w gabapentin and Thorazine. Rest of care as above .

## 2022-05-01 NOTE — PROGRESS NOTE ADULT - ASSESSMENT
61M with pmh alcohol abuse (sober x1 year), chronic pancreatitis, chronic esophagitis with stricture (s/p stent 4/19/22), CKD4 who presents transferred from [place] for advanced GI evaluation due to abdominal pain and hematemesis on 4/26.

## 2022-05-01 NOTE — PROGRESS NOTE ADULT - PROBLEM SELECTOR PLAN 3
- c/w creon  - pain control; home regimen oxycodone 10mg q4h (or oxy-acetaminophen 10/325 q4h).  ISTOP in chart, patient follows with Dr. Hurtado within [facility].  - History of constipation requiring disimpaction due to refusal of bowel regimen.  Encouraged use of bowel regimen today to prevent repeated need for disimpaction; patient continued to refuse bowel regimen.

## 2022-05-01 NOTE — PROGRESS NOTE ADULT - PROBLEM SELECTOR PLAN 6
- active type and screen  - 2 18g or larger IVs active  - PPI BID  - given ceftriaxone at VS  - s/p EGD with stricture ballooning

## 2022-05-01 NOTE — PROGRESS NOTE ADULT - PROBLEM SELECTOR PLAN 8
dvt ppx: chemoppx deferred due to active bleeding, venodynes  GI ppx: PPI BID due to GI bleed  diet: CLD, NPO @ midnight    Transitions of Care Status:  1.  Name of PCP: Lamberto Hurtado  2.  PCP Contacted on Admission: [x ] Y    [ ] N    3.  PCP contacted at Discharge: [ ] Y    [ ] N    [ ] N/A  4.  Post-Discharge Appointment Date and Location:  5.  Summary of Handoff given to PCP: dvt ppx: chemoppx deferred due to active bleeding, venodynes  GI ppx: PPI BID due to GI bleed  diet: pureed    Transitions of Care Status:  1.  Name of PCP: Lamberto Hurtado  2.  PCP Contacted on Admission: [x ] Y    [ ] N    3.  PCP contacted at Discharge: [ ] Y    [ ] N    [ ] N/A  4.  Post-Discharge Appointment Date and Location:  5.  Summary of Handoff given to PCP:

## 2022-05-01 NOTE — PROGRESS NOTE ADULT - PROBLEM SELECTOR PLAN 2
- recent stent 4/19  - ballooning of stricture 4/29 and re-positioning causing some sloughing of esophagus  - IV pantoprazole BID through 5/2  - pureed diet with protein supplements

## 2022-05-02 PROBLEM — F10.10 ALCOHOL ABUSE, UNCOMPLICATED: Chronic | Status: ACTIVE | Noted: 2019-05-16

## 2022-05-02 LAB
ANION GAP SERPL CALC-SCNC: 9 MMOL/L — SIGNIFICANT CHANGE UP (ref 7–14)
BASOPHILS # BLD AUTO: 0.09 K/UL — SIGNIFICANT CHANGE UP (ref 0–0.2)
BASOPHILS NFR BLD AUTO: 0.7 % — SIGNIFICANT CHANGE UP (ref 0–2)
BUN SERPL-MCNC: 26 MG/DL — HIGH (ref 7–23)
CALCIUM SERPL-MCNC: 8.8 MG/DL — SIGNIFICANT CHANGE UP (ref 8.4–10.5)
CHLORIDE SERPL-SCNC: 104 MMOL/L — SIGNIFICANT CHANGE UP (ref 98–107)
CO2 SERPL-SCNC: 19 MMOL/L — LOW (ref 22–31)
CREAT SERPL-MCNC: 2.4 MG/DL — HIGH (ref 0.5–1.3)
EGFR: 30 ML/MIN/1.73M2 — LOW
EOSINOPHIL # BLD AUTO: 0.18 K/UL — SIGNIFICANT CHANGE UP (ref 0–0.5)
EOSINOPHIL NFR BLD AUTO: 1.4 % — SIGNIFICANT CHANGE UP (ref 0–6)
GLUCOSE SERPL-MCNC: 90 MG/DL — SIGNIFICANT CHANGE UP (ref 70–99)
HCT VFR BLD CALC: 32.6 % — LOW (ref 39–50)
HGB BLD-MCNC: 10.2 G/DL — LOW (ref 13–17)
IANC: 10.83 K/UL — HIGH (ref 1.8–7.4)
IMM GRANULOCYTES NFR BLD AUTO: 0.4 % — SIGNIFICANT CHANGE UP (ref 0–1.5)
LYMPHOCYTES # BLD AUTO: 1.46 K/UL — SIGNIFICANT CHANGE UP (ref 1–3.3)
LYMPHOCYTES # BLD AUTO: 11 % — LOW (ref 13–44)
MAGNESIUM SERPL-MCNC: 1.9 MG/DL — SIGNIFICANT CHANGE UP (ref 1.6–2.6)
MCHC RBC-ENTMCNC: 29.1 PG — SIGNIFICANT CHANGE UP (ref 27–34)
MCHC RBC-ENTMCNC: 31.3 GM/DL — LOW (ref 32–36)
MCV RBC AUTO: 92.9 FL — SIGNIFICANT CHANGE UP (ref 80–100)
MONOCYTES # BLD AUTO: 0.68 K/UL — SIGNIFICANT CHANGE UP (ref 0–0.9)
MONOCYTES NFR BLD AUTO: 5.1 % — SIGNIFICANT CHANGE UP (ref 2–14)
NEUTROPHILS # BLD AUTO: 10.83 K/UL — HIGH (ref 1.8–7.4)
NEUTROPHILS NFR BLD AUTO: 81.4 % — HIGH (ref 43–77)
NRBC # BLD: 0 /100 WBCS — SIGNIFICANT CHANGE UP
NRBC # FLD: 0 K/UL — SIGNIFICANT CHANGE UP
PHOSPHATE SERPL-MCNC: 3 MG/DL — SIGNIFICANT CHANGE UP (ref 2.5–4.5)
PLATELET # BLD AUTO: 252 K/UL — SIGNIFICANT CHANGE UP (ref 150–400)
POTASSIUM SERPL-MCNC: 4.6 MMOL/L — SIGNIFICANT CHANGE UP (ref 3.5–5.3)
POTASSIUM SERPL-SCNC: 4.6 MMOL/L — SIGNIFICANT CHANGE UP (ref 3.5–5.3)
RBC # BLD: 3.51 M/UL — LOW (ref 4.2–5.8)
RBC # FLD: 18 % — HIGH (ref 10.3–14.5)
SODIUM SERPL-SCNC: 132 MMOL/L — LOW (ref 135–145)
WBC # BLD: 13.29 K/UL — HIGH (ref 3.8–10.5)
WBC # FLD AUTO: 13.29 K/UL — HIGH (ref 3.8–10.5)

## 2022-05-02 PROCEDURE — 99232 SBSQ HOSP IP/OBS MODERATE 35: CPT | Mod: GC

## 2022-05-02 RX ORDER — PANTOPRAZOLE SODIUM 20 MG/1
1 TABLET, DELAYED RELEASE ORAL
Qty: 0 | Refills: 0 | DISCHARGE
Start: 2022-05-02

## 2022-05-02 RX ORDER — PANTOPRAZOLE SODIUM 20 MG/1
40 TABLET, DELAYED RELEASE ORAL EVERY 12 HOURS
Refills: 0 | Status: DISCONTINUED | OUTPATIENT
Start: 2022-05-02 | End: 2022-05-03

## 2022-05-02 RX ADMIN — Medication 1 GRAM(S): at 05:18

## 2022-05-02 RX ADMIN — HYDROMORPHONE HYDROCHLORIDE 0.5 MILLIGRAM(S): 2 INJECTION INTRAMUSCULAR; INTRAVENOUS; SUBCUTANEOUS at 00:52

## 2022-05-02 RX ADMIN — OXYCODONE AND ACETAMINOPHEN 2 TABLET(S): 5; 325 TABLET ORAL at 23:02

## 2022-05-02 RX ADMIN — HYDROMORPHONE HYDROCHLORIDE 0.5 MILLIGRAM(S): 2 INJECTION INTRAMUSCULAR; INTRAVENOUS; SUBCUTANEOUS at 10:33

## 2022-05-02 RX ADMIN — HYDROMORPHONE HYDROCHLORIDE 0.5 MILLIGRAM(S): 2 INJECTION INTRAMUSCULAR; INTRAVENOUS; SUBCUTANEOUS at 09:51

## 2022-05-02 RX ADMIN — OXYCODONE AND ACETAMINOPHEN 2 TABLET(S): 5; 325 TABLET ORAL at 19:25

## 2022-05-02 RX ADMIN — HYDROMORPHONE HYDROCHLORIDE 0.5 MILLIGRAM(S): 2 INJECTION INTRAMUSCULAR; INTRAVENOUS; SUBCUTANEOUS at 16:27

## 2022-05-02 RX ADMIN — HYDROMORPHONE HYDROCHLORIDE 0.5 MILLIGRAM(S): 2 INJECTION INTRAMUSCULAR; INTRAVENOUS; SUBCUTANEOUS at 05:18

## 2022-05-02 RX ADMIN — HYDROMORPHONE HYDROCHLORIDE 0.5 MILLIGRAM(S): 2 INJECTION INTRAMUSCULAR; INTRAVENOUS; SUBCUTANEOUS at 05:43

## 2022-05-02 RX ADMIN — OXYCODONE AND ACETAMINOPHEN 2 TABLET(S): 5; 325 TABLET ORAL at 18:51

## 2022-05-02 RX ADMIN — OXYCODONE AND ACETAMINOPHEN 2 TABLET(S): 5; 325 TABLET ORAL at 23:47

## 2022-05-02 RX ADMIN — Medication 10 MILLIGRAM(S): at 21:23

## 2022-05-02 RX ADMIN — HYDROMORPHONE HYDROCHLORIDE 0.5 MILLIGRAM(S): 2 INJECTION INTRAMUSCULAR; INTRAVENOUS; SUBCUTANEOUS at 01:07

## 2022-05-02 RX ADMIN — HYDROMORPHONE HYDROCHLORIDE 0.5 MILLIGRAM(S): 2 INJECTION INTRAMUSCULAR; INTRAVENOUS; SUBCUTANEOUS at 15:12

## 2022-05-02 RX ADMIN — Medication 10 MILLIGRAM(S): at 07:30

## 2022-05-02 NOTE — PROGRESS NOTE ADULT - SUBJECTIVE AND OBJECTIVE BOX
PROGRESS NOTE:   Authored by Marvel Fitch MD  Pager:  [phone]    Patient is a 61y old  Male who presents with a chief complaint of Hematemesis (01 May 2022 07:00)      SUBJECTIVE / OVERNIGHT EVENTS:    ADDITIONAL REVIEW OF SYSTEMS:    MEDICATIONS  (STANDING):  bisacodyl 5 milliGRAM(s) Oral at bedtime  folic acid 1 milliGRAM(s) Oral daily  gabapentin Solution 200 milliGRAM(s) Oral at bedtime  pancrelipase  (CREON  6,000 Lipase Units) 2 Capsule(s) Oral three times a day with meals  pantoprazole  Injectable 40 milliGRAM(s) IV Push daily  polyethylene glycol 3350 17 Gram(s) Oral two times a day  senna 2 Tablet(s) Oral at bedtime  sodium chloride 0.9%. 1000 milliLiter(s) (85 mL/Hr) IV Continuous <Continuous>  sucralfate suspension 1 Gram(s) Oral two times a day  tamsulosin 0.4 milliGRAM(s) Oral at bedtime  thiamine 100 milliGRAM(s) Oral daily    MEDICATIONS  (PRN):  acetaminophen     Tablet .. 650 milliGRAM(s) Oral every 6 hours PRN Temp greater or equal to 38C (100.4F), Mild Pain (1 - 3)  chlorproMAZINE    Tablet 10 milliGRAM(s) Oral every 8 hours PRN hiccuping  cyclobenzaprine 5 milliGRAM(s) Oral three times a day PRN Muscle Spasm  HYDROmorphone  Injectable 0.5 milliGRAM(s) IV Push every 4 hours PRN Severe Pain (7 - 10)  melatonin 3 milliGRAM(s) Oral at bedtime PRN Insomnia  ondansetron Injectable 4 milliGRAM(s) IV Push every 8 hours PRN Nausea and/or Vomiting  oxycodone    5 mG/acetaminophen 325 mG 2 Tablet(s) Oral every 4 hours PRN Moderate Pain (4 - 6)      CAPILLARY BLOOD GLUCOSE        I&O's Summary    01 May 2022 07:01  -  02 May 2022 07:00  --------------------------------------------------------  IN: 0 mL / OUT: 300 mL / NET: -300 mL        PHYSICAL EXAM:  Vital Signs Last 24 Hrs  T(C): 36.8 (02 May 2022 05:18), Max: 36.9 (01 May 2022 09:49)  T(F): 98.3 (02 May 2022 05:18), Max: 98.4 (01 May 2022 09:49)  HR: 76 (02 May 2022 05:18) (70 - 83)  BP: 148/70 (02 May 2022 05:18) (148/70 - 153/71)  BP(mean): --  RR: 18 (02 May 2022 05:18) (17 - 18)  SpO2: 99% (02 May 2022 05:18) (98% - 100%)    GENERAL: No acute distress, well-developed  HEAD:  Atraumatic, Normocephalic  EYES: EOMI, PERRLA, conjunctiva and sclera clear  NECK: Supple, no lymphadenopathy, no JVD  CHEST/LUNG: CTAB; No wheezes, rales, or rhonchi  HEART: Regular rate and rhythm; No murmurs, rubs, or gallops  ABDOMEN: Soft, non-tender, non-distended; normal bowel sounds, no organomegaly  EXTREMITIES:  2+ peripheral pulses b/l, No clubbing, cyanosis, or edema  NEUROLOGY: A&O x 3, no focal deficits  SKIN: No rashes or lesions    LABS:                        11.0   20.63 )-----------( 327      ( 30 Apr 2022 18:57 )             33.4     04-30    134<L>  |  107  |  29<H>  ----------------------------<  104<H>  4.0   |  15<L>  |  2.48<H>    Ca    8.6      30 Apr 2022 07:23  Phos  3.1     04-30  Mg     1.90     04-30    TPro  6.1  /  Alb  3.0<L>  /  TBili  0.3  /  DBili  x   /  AST  11  /  ALT  5   /  AlkPhos  85  04-30    PT/INR - ( 30 Apr 2022 07:23 )   PT: 11.1 sec;   INR: 0.96 ratio         PTT - ( 30 Apr 2022 07:23 )  PTT:31.4 sec            RADIOLOGY & ADDITIONAL TESTS:  Results Reviewed:   Imaging Personally Reviewed:  Electrocardiogram Personally Reviewed:    COORDINATION OF CARE:  Care Discussed with Consultants/Other Providers [Y/N]:  Prior or Outpatient Records Reviewed [Y/N]:   PROGRESS NOTE:   Authored by Marvel Fitch MD  Pager:  [phone]    Patient is a 61y old  Male who presents with a chief complaint of Hematemesis (01 May 2022 07:00)      SUBJECTIVE / OVERNIGHT EVENTS: NAEO. Patient feels well. Patient was seen and examined at bedside this morning. Denies any nausea/vomiting/diarrhea, headache, shortness of breath, abdominal pain or chest pain/palpitations. Still with hiccups. Patient responding appropriately to questions and able to make needs known. Vital signs/imaging reviewed.      ADDITIONAL REVIEW OF SYSTEMS:  REVIEW OF SYSTEMS:    CONSTITUTIONAL: No weakness, fevers or chills  RESPIRATORY: No cough, wheezing, hemoptysis; No shortness of breath  CARDIOVASCULAR: No chest pain or palpitations  GASTROINTESTINAL: No abdominal or epigastric pain. No nausea, vomiting, or hematemesis; No diarrhea or constipation. No melena or hematochezia.  SKIN: No itching, rashes      MEDICATIONS  (STANDING):  bisacodyl 5 milliGRAM(s) Oral at bedtime  folic acid 1 milliGRAM(s) Oral daily  gabapentin Solution 200 milliGRAM(s) Oral at bedtime  pancrelipase  (CREON  6,000 Lipase Units) 2 Capsule(s) Oral three times a day with meals  pantoprazole  Injectable 40 milliGRAM(s) IV Push daily  polyethylene glycol 3350 17 Gram(s) Oral two times a day  senna 2 Tablet(s) Oral at bedtime  sodium chloride 0.9%. 1000 milliLiter(s) (85 mL/Hr) IV Continuous <Continuous>  sucralfate suspension 1 Gram(s) Oral two times a day  tamsulosin 0.4 milliGRAM(s) Oral at bedtime  thiamine 100 milliGRAM(s) Oral daily    MEDICATIONS  (PRN):  acetaminophen     Tablet .. 650 milliGRAM(s) Oral every 6 hours PRN Temp greater or equal to 38C (100.4F), Mild Pain (1 - 3)  chlorproMAZINE    Tablet 10 milliGRAM(s) Oral every 8 hours PRN hiccuping  cyclobenzaprine 5 milliGRAM(s) Oral three times a day PRN Muscle Spasm  HYDROmorphone  Injectable 0.5 milliGRAM(s) IV Push every 4 hours PRN Severe Pain (7 - 10)  melatonin 3 milliGRAM(s) Oral at bedtime PRN Insomnia  ondansetron Injectable 4 milliGRAM(s) IV Push every 8 hours PRN Nausea and/or Vomiting  oxycodone    5 mG/acetaminophen 325 mG 2 Tablet(s) Oral every 4 hours PRN Moderate Pain (4 - 6)      CAPILLARY BLOOD GLUCOSE        I&O's Summary    01 May 2022 07:01  -  02 May 2022 07:00  --------------------------------------------------------  IN: 0 mL / OUT: 300 mL / NET: -300 mL        PHYSICAL EXAM:  Vital Signs Last 24 Hrs  T(C): 36.8 (02 May 2022 05:18), Max: 36.9 (01 May 2022 09:49)  T(F): 98.3 (02 May 2022 05:18), Max: 98.4 (01 May 2022 09:49)  HR: 76 (02 May 2022 05:18) (70 - 83)  BP: 148/70 (02 May 2022 05:18) (148/70 - 153/71)  BP(mean): --  RR: 18 (02 May 2022 05:18) (17 - 18)  SpO2: 99% (02 May 2022 05:18) (98% - 100%)    GENERAL: No acute distress, well-developed  HEAD:  Atraumatic, Normocephalic  EYES: EOMI, PERRLA, conjunctiva and sclera clear  NECK: Supple, no lymphadenopathy, no JVD  CHEST/LUNG: CTAB; No wheezes, rales, or rhonchi  HEART: Regular rate and rhythm; No murmurs, rubs, or gallops  ABDOMEN: Soft, non-tender, non-distended; normal bowel sounds, no organomegaly  EXTREMITIES:  2+ peripheral pulses b/l, No clubbing, cyanosis, or edema  NEUROLOGY: A&O x 3, no focal deficits. Constant hiccup appreciated  SKIN: No rashes or lesions    LABS:                        11.0   20.63 )-----------( 327      ( 30 Apr 2022 18:57 )             33.4     04-30    134<L>  |  107  |  29<H>  ----------------------------<  104<H>  4.0   |  15<L>  |  2.48<H>    Ca    8.6      30 Apr 2022 07:23  Phos  3.1     04-30  Mg     1.90     04-30    TPro  6.1  /  Alb  3.0<L>  /  TBili  0.3  /  DBili  x   /  AST  11  /  ALT  5   /  AlkPhos  85  04-30    PT/INR - ( 30 Apr 2022 07:23 )   PT: 11.1 sec;   INR: 0.96 ratio         PTT - ( 30 Apr 2022 07:23 )  PTT:31.4 sec            RADIOLOGY & ADDITIONAL TESTS:  Results Reviewed:   Imaging Personally Reviewed:  Electrocardiogram Personally Reviewed:    COORDINATION OF CARE:  Care Discussed with Consultants/Other Providers [Y/N]:  Prior or Outpatient Records Reviewed [Y/N]:

## 2022-05-02 NOTE — PROGRESS NOTE ADULT - PROBLEM SELECTOR PLAN 6
- active type and screen  - 2 18g or larger IVs active  - PPI BID  - given ceftriaxone at VS  - s/p EGD with stricture ballooning - H/H now stable without recent episodes of hematemesis  - active type and screen  - s/p EGD with stricture ballooning

## 2022-05-02 NOTE — PROGRESS NOTE ADULT - SUBJECTIVE AND OBJECTIVE BOX
Chief Complaint:  Patient is a 61y old  Male who presents with a chief complaint of Hematemesis (02 May 2022 07:22)      Interval Events:     Tolerating pureed foods well  Hiccups which he states is chronic  No bowel movements     Allergies:  No Known Allergies      Hospital Medications:  acetaminophen     Tablet .. 650 milliGRAM(s) Oral every 6 hours PRN  bisacodyl 5 milliGRAM(s) Oral at bedtime  chlorproMAZINE    Tablet 10 milliGRAM(s) Oral every 8 hours PRN  cyclobenzaprine 5 milliGRAM(s) Oral three times a day PRN  folic acid 1 milliGRAM(s) Oral daily  gabapentin Solution 200 milliGRAM(s) Oral at bedtime  HYDROmorphone  Injectable 0.5 milliGRAM(s) IV Push every 4 hours PRN  melatonin 3 milliGRAM(s) Oral at bedtime PRN  ondansetron Injectable 4 milliGRAM(s) IV Push every 8 hours PRN  oxycodone    5 mG/acetaminophen 325 mG 2 Tablet(s) Oral every 4 hours PRN  pancrelipase  (CREON  6,000 Lipase Units) 2 Capsule(s) Oral three times a day with meals  pantoprazole  Injectable 40 milliGRAM(s) IV Push daily  polyethylene glycol 3350 17 Gram(s) Oral two times a day  senna 2 Tablet(s) Oral at bedtime  sodium chloride 0.9%. 1000 milliLiter(s) IV Continuous <Continuous>  sucralfate suspension 1 Gram(s) Oral two times a day  tamsulosin 0.4 milliGRAM(s) Oral at bedtime  thiamine 100 milliGRAM(s) Oral daily      PMHX/PSHX:  ETOH abuse    Pancreatitis    Hepatitis C    Gout    No significant past surgical history    Gastric perforation        Family history:  No pertinent family history in first degree relatives    FH: HTN (hypertension)        ROS:     General:  No weight loss, fevers, chills, night sweats, fatigue   Eyes:  No vision changes  ENT:  No sore throat, pain, runny nose  CV:  No chest pain, palpitations, dizziness   Resp:  No SOB, cough, wheezing  GI:  See HPI  :  No burning with urination, hematuria  Muscle:  No pain, weakness  Neuro:  No weakness/tingling, memory problems  Psych:  No fatigue, insomnia, mood problems, depression  Heme:  No easy bruisability  Skin:  No rash, edema      PHYSICAL EXAM:     GENERAL:  Thin, no distress, persistent hiccups  HEENT:  NC/AT,  conjunctivae clear, sclera anicteric  CHEST:  No increased effort w/ respirations  HEART:  Regular rhythm & rate  ABDOMEN:  Soft, non-tender, non-distended  EXTREMITIES:  no LE  edema  SKIN:  No rash/erythema/ecchymoses/petechiae/wounds/jaundice  NEURO:  Alert, orientedx3    Vital Signs:  Vital Signs Last 24 Hrs  T(C): 36.9 (02 May 2022 09:58), Max: 36.9 (02 May 2022 09:58)  T(F): 98.4 (02 May 2022 09:58), Max: 98.4 (02 May 2022 09:58)  HR: 77 (02 May 2022 09:58) (70 - 77)  BP: 150/72 (02 May 2022 09:58) (148/70 - 153/71)  BP(mean): --  RR: 18 (02 May 2022 09:58) (17 - 18)  SpO2: 99% (02 May 2022 09:58) (98% - 100%)  Daily     Daily     LABS:                        10.2   13.29 )-----------( 252      ( 02 May 2022 07:08 )             32.6     05-02    132<L>  |  104  |  26<H>  ----------------------------<  90  4.6   |  19<L>  |  2.40<H>    Ca    8.8      02 May 2022 07:08  Phos  3.0     05-02  Mg     1.90     05-02                Imaging:

## 2022-05-02 NOTE — PROGRESS NOTE ADULT - PROBLEM SELECTOR PLAN 2
- recent stent 4/19  - ballooning of stricture 4/29 and re-positioning causing some sloughing of esophagus  - IV pantoprazole BID through 5/2  - pureed diet with protein supplements - recent stent 4/19  - ballooning of stricture 4/29 and re-positioning causing some sloughing of esophagus  - PPI as above  - Continue Pureed diet with protein supplements  - Patient to follow up with outpatient GI in 4 weeks for removal of esophageal stent.

## 2022-05-02 NOTE — PROGRESS NOTE ADULT - ASSESSMENT
Impression:    # Dysphagia secondary to esophageal stricture with malpositioned esophageal stent (initially placed 4/19/22) -  Tolerating pureed foods well  s/p endoscopic dilation to 12 mm and repositioning of WallFlex stent (proximal end at 37 cm from incisors) over stricture with suture for stabilization.   # Hematemesis: Resolved at time of upper endoscopy as above; suspect prior bleeding due to esophageal ulceration from stent migration  # Abd pain - Possible due to chronic pancreatitis; unrelated to stricture  # HCV s/p treatment (neg viral load)      Recommendations:  - can transition to PO pantoprazole 40 mg BID (continue for 8 weeks)   - continue pureed diet today   - pain control per primary team  - will need repeat EGD in 4 weeks as outpatient for esophageal stent removal; call [phone] ([facility] at [address] [place] [address]) to make an appointment to schedule procedure.     Randi Marcum PGY-6  Gastroenterology/Hepatology Fellow  Pager #[phone]  E-mail [email or web address]  Call [phone] to speak to answering service for on-call GI fellow 5pm-7am and weekends.          Randi Marcum PGY-6  Gastroenterology/Hepatology Fellow  Pager #[phone]  E-mail [email or web address]  Call [phone] to speak to answering service for on-call GI fellow 5pm-7am and weekends.     Impression:    # Dysphagia secondary to esophageal stricture with malpositioned esophageal stent (initially placed 4/19/22) -  Tolerating pureed foods well  s/p endoscopic dilation to 12 mm and repositioning of WallFlex stent (proximal end at 37 cm from incisors) over stricture with suture for stabilization.   # Hematemesis: Resolved at time of upper endoscopy as above; suspect prior bleeding due to esophageal ulceration from stent migration  # Abd pain - Possible due to chronic pancreatitis; unrelated to stricture  # HCV s/p treatment (neg viral load)      Recommendations:  - can transition to PO pantoprazole 40 mg BID (continue for 8 weeks)   - can advance diet to mechanical soft today  - pain control per primary team  - will need repeat EGD in 4 weeks as outpatient for esophageal stent removal; call [phone] ([facility] at [address] [place] [address]) to make an appointment to schedule procedure.     Randi Marcum PGY-6  Gastroenterology/Hepatology Fellow  Pager #[phone]  E-mail [email or web address]  Call [phone] to speak to answering service for on-call GI fellow 5pm-7am and weekends.          Randi Marcum PGY-6  Gastroenterology/Hepatology Fellow  Pager #[phone]  E-mail [email or web address]  Call [phone] to speak to answering service for on-call GI fellow 5pm-7am and weekends.

## 2022-05-02 NOTE — PROGRESS NOTE ADULT - PROBLEM SELECTOR PLAN 8
dvt ppx: chemoppx deferred due to active bleeding, venodynes  GI ppx: PPI BID due to GI bleed  diet: pureed    Transitions of Care Status:  1.  Name of PCP: Lamberto Hurtado  2.  PCP Contacted on Admission: [x ] Y    [ ] N    3.  PCP contacted at Discharge: [ ] Y    [ ] N    [ ] N/A  4.  Post-Discharge Appointment Date and Location:  5.  Summary of Handoff given to PCP: dvt ppx: chemoppx deferred due to active bleeding, Venodyne  GI ppx: PPI BID due to GI bleed  diet: pureed

## 2022-05-02 NOTE — PROGRESS NOTE ADULT - ASSESSMENT
61M with pmh alcohol abuse (sober x1 year), chronic pancreatitis, chronic esophagitis with stricture (s/p stent 4/19/22), CKD4 who presents transferred from [place] for advanced GI evaluation due to abdominal pain and hematemesis on 4/26.

## 2022-05-02 NOTE — PROGRESS NOTE ADULT - PROBLEM SELECTOR PLAN 1
- no further episodes hematemesis  - PPI BID  - trend CBC q12hr  - gentle hydration, appears dry  - WBCs uptrending, will repeat CBC if continues to uptrend ID workup - no further episodes hematemesis  - Discontinue Protonix IV and start 40mg PO BID x 8 weeks  - trend CBC q24  - Tolerating PO  - WBCs uptrending, will repeat CBC if continues to uptrend ID workup

## 2022-05-02 NOTE — PROGRESS NOTE ADULT - PROBLEM SELECTOR PLAN 3
- c/w creon  - pain control; home regimen oxycodone 10mg q4h (or oxy-acetaminophen 10/325 q4h).  ISTOP in chart, patient follows with Dr. Hurtado within [facility].  - History of constipation requiring disimpaction due to refusal of bowel regimen.  Encouraged use of bowel regimen today to prevent repeated need for disimpaction; patient continued to refuse bowel regimen.

## 2022-05-02 NOTE — PROGRESS NOTE ADULT - ATTENDING COMMENTS
62 yo M w/ PMH of alcohol use disorder (remote hx), chronic pancreatitis, chronic esophagitis, HCV, CKD4, recent EGD on 4/8 w/ findings of esophageal stricture s/p stent placement; presented to [facility] for hematemesis and transferred to [facility] for advanced endoscopic evaluation.     # Acute blood loss Anemia likely 2/2 UGIB  - s/p 2 units transfusion at [facility]  - no further episodes of hematemesis, melena or BRBPR  - repeat EGD at [facility] on 4/29 - esophageal stricture s/p CRE balloon dilation, migration of esophageal stent s/p stent relocation over stricture with suturing to keep stent in place.  - on IV protonix through 5/2 then switch to po protonix  - c/w carafate  - advance diet as tolerated  - outpt GI f/u    # Leukocytosis of unclear etiology; no symptoms of infectious process; possibly reactive?   -WBC downtrending; pt remains afebrile    # Chronic pain 2/2 pancreatitis  - On home med Oxycodone (home med) with IV hydromorphone for severe pain; reports pain improving  - f/u w/ Dr. Hurtado on discharge  - c/w Creon    # YUNIEL on CKD   - Cr improving back to baseline  - f/u w/ nephrology as outpt    # Dispo - d/c planning after transition from IV to PO protonix and tolerating diet (advancing as tolerated)

## 2022-05-03 ENCOUNTER — TRANSCRIPTION ENCOUNTER (OUTPATIENT)
Age: 62
End: 2022-05-03

## 2022-05-03 VITALS
RESPIRATION RATE: 18 BRPM | DIASTOLIC BLOOD PRESSURE: 74 MMHG | OXYGEN SATURATION: 100 % | SYSTOLIC BLOOD PRESSURE: 148 MMHG | HEART RATE: 71 BPM | TEMPERATURE: 98 F

## 2022-05-03 PROCEDURE — 99239 HOSP IP/OBS DSCHRG MGMT >30: CPT | Mod: GC

## 2022-05-03 PROCEDURE — 99232 SBSQ HOSP IP/OBS MODERATE 35: CPT | Mod: GC

## 2022-05-03 RX ORDER — PANTOPRAZOLE SODIUM 20 MG/1
1 TABLET, DELAYED RELEASE ORAL
Qty: 60 | Refills: 2
Start: 2022-05-03 | End: 2022-07-31

## 2022-05-03 RX ADMIN — Medication 2 CAPSULE(S): at 17:58

## 2022-05-03 RX ADMIN — Medication 100 MILLIGRAM(S): at 11:21

## 2022-05-03 RX ADMIN — OXYCODONE AND ACETAMINOPHEN 2 TABLET(S): 5; 325 TABLET ORAL at 08:18

## 2022-05-03 RX ADMIN — OXYCODONE AND ACETAMINOPHEN 2 TABLET(S): 5; 325 TABLET ORAL at 07:18

## 2022-05-03 RX ADMIN — Medication 1 MILLIGRAM(S): at 11:21

## 2022-05-03 RX ADMIN — OXYCODONE AND ACETAMINOPHEN 2 TABLET(S): 5; 325 TABLET ORAL at 04:09

## 2022-05-03 RX ADMIN — Medication 2 CAPSULE(S): at 18:39

## 2022-05-03 RX ADMIN — OXYCODONE AND ACETAMINOPHEN 2 TABLET(S): 5; 325 TABLET ORAL at 15:55

## 2022-05-03 RX ADMIN — OXYCODONE AND ACETAMINOPHEN 2 TABLET(S): 5; 325 TABLET ORAL at 03:09

## 2022-05-03 RX ADMIN — Medication 2 CAPSULE(S): at 09:16

## 2022-05-03 RX ADMIN — OXYCODONE AND ACETAMINOPHEN 2 TABLET(S): 5; 325 TABLET ORAL at 12:20

## 2022-05-03 RX ADMIN — OXYCODONE AND ACETAMINOPHEN 2 TABLET(S): 5; 325 TABLET ORAL at 11:21

## 2022-05-03 RX ADMIN — OXYCODONE AND ACETAMINOPHEN 2 TABLET(S): 5; 325 TABLET ORAL at 16:55

## 2022-05-03 NOTE — PROGRESS NOTE ADULT - PROBLEM SELECTOR PROBLEM 3
Chronic pancreatitis

## 2022-05-03 NOTE — PROGRESS NOTE ADULT - SUBJECTIVE AND OBJECTIVE BOX
Chief Complaint:  Patient is a 61y old  Male who presents with a chief complaint of Hematemesis (03 May 2022 07:12)      Interval Events:     Advanced to soft, bite sized foods yesterday, tolerated well and denied dysphagia, regurgitation or chest pain  Planned for discharge to home today     Allergies:  No Known Allergies      Hospital Medications:  acetaminophen     Tablet .. 650 milliGRAM(s) Oral every 6 hours PRN  bisacodyl 5 milliGRAM(s) Oral at bedtime  chlorproMAZINE    Tablet 10 milliGRAM(s) Oral every 8 hours PRN  cyclobenzaprine 5 milliGRAM(s) Oral three times a day PRN  folic acid 1 milliGRAM(s) Oral daily  gabapentin Solution 200 milliGRAM(s) Oral at bedtime  HYDROmorphone  Injectable 0.5 milliGRAM(s) IV Push every 4 hours PRN  melatonin 3 milliGRAM(s) Oral at bedtime PRN  ondansetron Injectable 4 milliGRAM(s) IV Push every 8 hours PRN  oxycodone    5 mG/acetaminophen 325 mG 2 Tablet(s) Oral every 4 hours PRN  pancrelipase  (CREON  6,000 Lipase Units) 2 Capsule(s) Oral three times a day with meals  pantoprazole    Tablet 40 milliGRAM(s) Oral every 12 hours  polyethylene glycol 3350 17 Gram(s) Oral two times a day  senna 2 Tablet(s) Oral at bedtime  sodium chloride 0.9%. 1000 milliLiter(s) IV Continuous <Continuous>  sucralfate suspension 1 Gram(s) Oral two times a day  tamsulosin 0.4 milliGRAM(s) Oral at bedtime  thiamine 100 milliGRAM(s) Oral daily      PMHX/PSHX:  ETOH abuse    Pancreatitis    Hepatitis C    Gout    No significant past surgical history    Gastric perforation        Family history:  No pertinent family history in first degree relatives    FH: HTN (hypertension)        ROS:     General:  No weight loss, fevers, chills, night sweats, fatigue   Eyes:  No vision changes  ENT:  No sore throat, pain, runny nose  CV:  No chest pain, palpitations, dizziness   Resp:  No SOB, cough, wheezing  GI:  See HPI  :  No burning with urination, hematuria  Muscle:  No pain, weakness  Neuro:  No weakness/tingling, memory problems  Psych:  No fatigue, insomnia, mood problems, depression  Heme:  No easy bruisability  Skin:  No rash, edema      PHYSICAL EXAM:     GENERAL:  Thin, no distress, persistent hiccups  HEENT:  NC/AT,  conjunctivae clear, sclera anicteric  CHEST:  No increased effort w/ respirations  HEART:  Regular rhythm & rate  ABDOMEN:  Soft, non-tender, non-distended  EXTREMITIES:  no LE  edema  SKIN:  No rash/erythema/ecchymoses/petechiae/wounds/jaundice  NEURO:  Alert, orientedx3      Vital Signs:  Vital Signs Last 24 Hrs  T(C): 36.7 (03 May 2022 07:18), Max: 37.2 (02 May 2022 23:02)  T(F): 98 (03 May 2022 07:18), Max: 98.9 (02 May 2022 23:02)  HR: 71 (03 May 2022 07:18) (71 - 84)  BP: 148/74 (03 May 2022 07:18) (143/74 - 153/74)  BP(mean): --  RR: 18 (03 May 2022 07:18) (17 - 18)  SpO2: 100% (03 May 2022 07:18) (100% - 100%)  Daily     Daily     LABS:                        10.2   13.29 )-----------( 252      ( 02 May 2022 07:08 )             32.6     05-02    132<L>  |  104  |  26<H>  ----------------------------<  90  4.6   |  19<L>  |  2.40<H>    Ca    8.8      02 May 2022 07:08  Phos  3.0     05-02  Mg     1.90     05-02                Imaging:

## 2022-05-03 NOTE — PROVIDER CONTACT NOTE (OTHER) - BACKGROUND
Patient admitted for gastrointestinal hemorrhage
Patient admitted for gastrointestinal hemorrhage
Pt was admitted for GI bleed
Patient admitted for gastrointestinal hemorrhage

## 2022-05-03 NOTE — PROGRESS NOTE ADULT - ASSESSMENT
61M with pmh alcohol abuse (sober x1 year), chronic pancreatitis, chronic esophagitis with stricture (s/p stent 4/19/22), CKD4 who presents transferred from [place] for advanced GI evaluation due to abdominal pain and hematemesis on 4/26.

## 2022-05-03 NOTE — PROGRESS NOTE ADULT - PROBLEM SELECTOR PLAN 6
- H/H now stable without recent episodes of hematemesis  - active type and screen  - s/p EGD with stricture ballooning

## 2022-05-03 NOTE — PROVIDER CONTACT NOTE (OTHER) - ASSESSMENT
Pt c/o of shortness of breath. VSS stable ( Refer to VSS flowsheet)
Patient refused 6am medication
Patient refused bisacodyl, gabapentin, senna and tamsulosin. Patient shows no s/s of acute distress
Patient refused bisacodyl, gabapentin, senna and tamsulosin. Patient shows no s/s of acute distress

## 2022-05-03 NOTE — PROGRESS NOTE ADULT - PROBLEM SELECTOR PROBLEM 2
Ulcerative esophagitis

## 2022-05-03 NOTE — PROVIDER CONTACT NOTE (OTHER) - NAME OF MD/NP/PA/DO NOTIFIED:
MD Avendano, Geraldine Perez
MD Avendano, Geraldine Perez
Dr Avendano [id number] aware
MD Avendano, Geraldine Perez

## 2022-05-03 NOTE — PROGRESS NOTE ADULT - PROBLEM SELECTOR PLAN 1
- no further episodes hematemesis  - Discontinue Protonix IV and start 40mg PO BID x 8 weeks  - trend CBC q24  - Tolerating PO  - WBCs uptrending, will repeat CBC if continues to uptrend ID workup - No further episodes hematemesis  - Discontinue Protonix IV and start 40mg PO BID x 8 weeks  - trend CBC q24  - Tolerating PO  - WBCs uptrending, will repeat CBC if continues to uptrend ID workup

## 2022-05-03 NOTE — PROVIDER CONTACT NOTE (OTHER) - ACTION/TREATMENT ORDERED:
MD made aware, nothing to do. Patient educated on importance of taking his medication and still refused
MD made aware.
MD ordered chest xray
MD made aware, nothing to do.

## 2022-05-03 NOTE — DISCHARGE NOTE NURSING/CASE MANAGEMENT/SOCIAL WORK - PATIENT PORTAL LINK FT
You can access the FollowMyHealth Patient Portal offered by [facility] by registering at the following website: [email or web address]. By joining [employer]’s FollowMyHealth portal, you will also be able to view your health information using other applications (apps) compatible with our system.

## 2022-05-03 NOTE — PROGRESS NOTE ADULT - PROBLEM SELECTOR PLAN 8
dvt ppx: chemoppx deferred due to active bleeding, Venodyne  GI ppx: PPI BID due to GI bleed  diet: pureed

## 2022-05-03 NOTE — PROGRESS NOTE ADULT - ATTENDING COMMENTS
62 yo M w/ PMH of alcohol use disorder (remote hx), chronic pancreatitis, chronic esophagitis, HCV, CKD4, recent EGD on 4/8 w/ findings of esophageal stricture s/p stent placement; presented to [facility] for hematemesis and transferred to [facility] for advanced endoscopic evaluation.     # Acute blood loss Anemia likely 2/2 UGIB  - s/p 2 units transfusion at [facility]  - no further episodes of hematemesis, melena, or BRBPR  - repeat EGD at [facility] on 4/29 - esophageal stricture s/p CRE balloon dilation, migration of esophageal stent s/p stent relocation over stricture with suturing to keep stent in place.  - switched to po protonix yesterday and to remain on it for 8 weeks  - c/w carafate  - diet advanced to soft and tolerating  - d/w GI - f/u as outpt in 4 weeks    # Leukocytosis of unclear etiology; no symptoms of infectious process; possibly reactive?   -WBC downtrending; pt remains afebrile    # Chronic pain 2/2 pancreatitis  - On home med Oxycodone (home med) with IV hydromorphone for severe pain; pain improved  - f/u w/ Dr. Hurtado on discharge  - c/w Creon    # YUNIEL on CKD   - Cr improving back to baseline  - f/u w/ nephrology as outpt    # Dispo - d/c home and f/u with PCP and GI as outpt

## 2022-05-03 NOTE — DISCHARGE NOTE NURSING/CASE MANAGEMENT/SOCIAL WORK - NSDCFUADDAPPT_GEN_ALL_CORE_FT
APPOINTMENT DATE:  THURSDAY MAY 12 AT 9:00AM    Please follow up with [facility] at [phone] for further evaluation and planning for removal of your esophagal stent.

## 2022-05-03 NOTE — PROGRESS NOTE ADULT - PROBLEM SELECTOR PLAN 3
- c/w creon  - pain control; home regimen oxycodone 10mg q4h (or oxy-acetaminophen 10/325 q4h).  ISTOP in chart, patient follows with Dr. Hurtado within [facility].  - History of constipation requiring disimpaction due to refusal of bowel regimen.  Encouraged use of bowel regimen today to prevent repeated need for disimpaction; patient continued to refuse bowel regimen.

## 2022-05-03 NOTE — PROVIDER CONTACT NOTE (OTHER) - SITUATION
Patient refused 6am medication
Pt c/o of shortness of breath
Patient refused bisacodyl, gabapentin, senna and tamsulosin
Patient refused bisacodyl, gabapentin, senna and tamsulosin

## 2022-05-03 NOTE — DISCHARGE NOTE NURSING/CASE MANAGEMENT/SOCIAL WORK - NSDCPEFALRISK_GEN_ALL_CORE
For information on Fall & Injury Prevention, visit: [email or web address] OR  [email or web address] OR  https://www.cdc.gov/steadi/patient.html

## 2022-05-03 NOTE — PROGRESS NOTE ADULT - PROBLEM SELECTOR PLAN 2
- recent stent 4/19  - ballooning of stricture 4/29 and re-positioning causing some sloughing of esophagus  - PPI as above  - Continue Pureed diet with protein supplements  - Patient to follow up with outpatient GI in 4 weeks for removal of esophageal stent. - Recent stent 4/19  - Ballooning of stricture 4/29 and re-positioning causing some sloughing of esophagus  - PPI as above  - Diet advanced from Puree diet to Soft 5/2. Can Advance to Regular as he tolerated previous diet well  - Patient to follow up with outpatient GI in 4 weeks for removal of esophageal stent.

## 2022-05-03 NOTE — PROVIDER CONTACT NOTE (OTHER) - REASON
Patient refusing 10PM medication
Patient refusing 6AM medication
Patient refusing 10PM medication
Pt c/o of shortness of breath

## 2022-05-03 NOTE — PROGRESS NOTE ADULT - SUBJECTIVE AND OBJECTIVE BOX
PROGRESS NOTE:   Authored by Marvel Fitch MD  Pager:  [phone]    Patient is a 61y old  Male who presents with a chief complaint of Hematemesis (02 May 2022 10:49)      SUBJECTIVE / OVERNIGHT EVENTS:    ADDITIONAL REVIEW OF SYSTEMS:    MEDICATIONS  (STANDING):  bisacodyl 5 milliGRAM(s) Oral at bedtime  folic acid 1 milliGRAM(s) Oral daily  gabapentin Solution 200 milliGRAM(s) Oral at bedtime  pancrelipase  (CREON  6,000 Lipase Units) 2 Capsule(s) Oral three times a day with meals  pantoprazole    Tablet 40 milliGRAM(s) Oral every 12 hours  polyethylene glycol 3350 17 Gram(s) Oral two times a day  senna 2 Tablet(s) Oral at bedtime  sodium chloride 0.9%. 1000 milliLiter(s) (85 mL/Hr) IV Continuous <Continuous>  sucralfate suspension 1 Gram(s) Oral two times a day  tamsulosin 0.4 milliGRAM(s) Oral at bedtime  thiamine 100 milliGRAM(s) Oral daily    MEDICATIONS  (PRN):  acetaminophen     Tablet .. 650 milliGRAM(s) Oral every 6 hours PRN Temp greater or equal to 38C (100.4F), Mild Pain (1 - 3)  chlorproMAZINE    Tablet 10 milliGRAM(s) Oral every 8 hours PRN hiccuping  cyclobenzaprine 5 milliGRAM(s) Oral three times a day PRN Muscle Spasm  HYDROmorphone  Injectable 0.5 milliGRAM(s) IV Push every 4 hours PRN Severe Pain (7 - 10)  melatonin 3 milliGRAM(s) Oral at bedtime PRN Insomnia  ondansetron Injectable 4 milliGRAM(s) IV Push every 8 hours PRN Nausea and/or Vomiting  oxycodone    5 mG/acetaminophen 325 mG 2 Tablet(s) Oral every 4 hours PRN Moderate Pain (4 - 6)      CAPILLARY BLOOD GLUCOSE        I&O's Summary      PHYSICAL EXAM:  Vital Signs Last 24 Hrs  T(C): 36.2 (03 May 2022 03:09), Max: 37.2 (02 May 2022 23:02)  T(F): 97.2 (03 May 2022 03:09), Max: 98.9 (02 May 2022 23:02)  HR: 73 (03 May 2022 03:09) (72 - 84)  BP: 153/74 (03 May 2022 03:09) (143/74 - 153/74)  BP(mean): --  RR: 17 (03 May 2022 03:09) (17 - 18)  SpO2: 100% (03 May 2022 03:09) (99% - 100%)    GENERAL: No acute distress, well-developed  HEAD:  Atraumatic, Normocephalic  EYES: EOMI, PERRLA, conjunctiva and sclera clear  NECK: Supple, no lymphadenopathy, no JVD  CHEST/LUNG: CTAB; No wheezes, rales, or rhonchi  HEART: Regular rate and rhythm; No murmurs, rubs, or gallops  ABDOMEN: Soft, non-tender, non-distended; normal bowel sounds, no organomegaly  EXTREMITIES:  2+ peripheral pulses b/l, No clubbing, cyanosis, or edema  NEUROLOGY: A&O x 3, no focal deficits  SKIN: No rashes or lesions    LABS:                        10.2   13.29 )-----------( 252      ( 02 May 2022 07:08 )             32.6     05-02    132<L>  |  104  |  26<H>  ----------------------------<  90  4.6   |  19<L>  |  2.40<H>    Ca    8.8      02 May 2022 07:08  Phos  3.0     05-02  Mg     1.90     05-02                  RADIOLOGY & ADDITIONAL TESTS:  Results Reviewed:   Imaging Personally Reviewed:  Electrocardiogram Personally Reviewed:    COORDINATION OF CARE:  Care Discussed with Consultants/Other Providers [Y/N]:  Prior or Outpatient Records Reviewed [Y/N]:   PROGRESS NOTE:   Authored by Marvel Fitch MD  Pager:  [phone]    Patient is a 61y old  Male who presents with a chief complaint of Hematemesis (02 May 2022 10:49)      SUBJECTIVE / OVERNIGHT EVENTS: NAEO. Patient has no new complaints this morning.     ADDITIONAL REVIEW OF SYSTEMS:  REVIEW OF SYSTEMS:    CONSTITUTIONAL: No weakness, fevers or chills  RESPIRATORY: No cough, wheezing, hemoptysis; No shortness of breath  CARDIOVASCULAR: No chest pain or palpitations  GASTROINTESTINAL: No abdominal or epigastric pain. No nausea, vomiting, or hematemesis; No diarrhea or constipation. No melena or hematochezia.  SKIN: No itching, rashes      MEDICATIONS  (STANDING):  bisacodyl 5 milliGRAM(s) Oral at bedtime  folic acid 1 milliGRAM(s) Oral daily  gabapentin Solution 200 milliGRAM(s) Oral at bedtime  pancrelipase  (CREON  6,000 Lipase Units) 2 Capsule(s) Oral three times a day with meals  pantoprazole    Tablet 40 milliGRAM(s) Oral every 12 hours  polyethylene glycol 3350 17 Gram(s) Oral two times a day  senna 2 Tablet(s) Oral at bedtime  sodium chloride 0.9%. 1000 milliLiter(s) (85 mL/Hr) IV Continuous <Continuous>  sucralfate suspension 1 Gram(s) Oral two times a day  tamsulosin 0.4 milliGRAM(s) Oral at bedtime  thiamine 100 milliGRAM(s) Oral daily    MEDICATIONS  (PRN):  acetaminophen     Tablet .. 650 milliGRAM(s) Oral every 6 hours PRN Temp greater or equal to 38C (100.4F), Mild Pain (1 - 3)  chlorproMAZINE    Tablet 10 milliGRAM(s) Oral every 8 hours PRN hiccuping  cyclobenzaprine 5 milliGRAM(s) Oral three times a day PRN Muscle Spasm  HYDROmorphone  Injectable 0.5 milliGRAM(s) IV Push every 4 hours PRN Severe Pain (7 - 10)  melatonin 3 milliGRAM(s) Oral at bedtime PRN Insomnia  ondansetron Injectable 4 milliGRAM(s) IV Push every 8 hours PRN Nausea and/or Vomiting  oxycodone    5 mG/acetaminophen 325 mG 2 Tablet(s) Oral every 4 hours PRN Moderate Pain (4 - 6)      CAPILLARY BLOOD GLUCOSE        I&O's Summary      PHYSICAL EXAM:  Vital Signs Last 24 Hrs  T(C): 36.2 (03 May 2022 03:09), Max: 37.2 (02 May 2022 23:02)  T(F): 97.2 (03 May 2022 03:09), Max: 98.9 (02 May 2022 23:02)  HR: 73 (03 May 2022 03:09) (72 - 84)  BP: 153/74 (03 May 2022 03:09) (143/74 - 153/74)  BP(mean): --  RR: 17 (03 May 2022 03:09) (17 - 18)  SpO2: 100% (03 May 2022 03:09) (99% - 100%)    GENERAL: No acute distress, well-developed  HEAD:  Atraumatic, Normocephalic  EYES: EOMI, PERRLA, conjunctiva and sclera clear  NECK: Supple, no lymphadenopathy, no JVD  CHEST/LUNG: CTAB; No wheezes, rales, or rhonchi  HEART: Regular rate and rhythm; No murmurs, rubs, or gallops  ABDOMEN: Soft, non-tender, non-distended; normal bowel sounds, no organomegaly  EXTREMITIES:  2+ peripheral pulses b/l, No clubbing, cyanosis, or edema  NEUROLOGY: A&O x 3, no focal deficits  SKIN: No rashes or lesions    LABS:                        10.2   13.29 )-----------( 252      ( 02 May 2022 07:08 )             32.6     05-02    132<L>  |  104  |  26<H>  ----------------------------<  90  4.6   |  19<L>  |  2.40<H>    Ca    8.8      02 May 2022 07:08  Phos  3.0     05-02  Mg     1.90     05-02                  RADIOLOGY & ADDITIONAL TESTS:  Results Reviewed:   Imaging Personally Reviewed:  Electrocardiogram Personally Reviewed:    COORDINATION OF CARE:  Care Discussed with Consultants/Other Providers [Y/N]:  Prior or Outpatient Records Reviewed [Y/N]:   PROGRESS NOTE:   Authored by Marvel Fitch MD  Pager:  [phone]    Patient is a 61y old  Male who presents with a chief complaint of Hematemesis (02 May 2022 10:49)      SUBJECTIVE / OVERNIGHT EVENTS: NAEO. Patient has no new complaints this morning.     ADDITIONAL REVIEW OF SYSTEMS:  REVIEW OF SYSTEMS:    CONSTITUTIONAL: No weakness, fevers or chills  RESPIRATORY: No cough, wheezing, hemoptysis; No shortness of breath  CARDIOVASCULAR: No chest pain or palpitations  GASTROINTESTINAL: No abdominal or epigastric pain. No nausea, vomiting, or hematemesis; No diarrhea or constipation. No melena or hematochezia.  SKIN: No itching, rashes      MEDICATIONS  (STANDING):  bisacodyl 5 milliGRAM(s) Oral at bedtime  folic acid 1 milliGRAM(s) Oral daily  gabapentin Solution 200 milliGRAM(s) Oral at bedtime  pancrelipase  (CREON  6,000 Lipase Units) 2 Capsule(s) Oral three times a day with meals  pantoprazole    Tablet 40 milliGRAM(s) Oral every 12 hours  polyethylene glycol 3350 17 Gram(s) Oral two times a day  senna 2 Tablet(s) Oral at bedtime  sodium chloride 0.9%. 1000 milliLiter(s) (85 mL/Hr) IV Continuous <Continuous>  sucralfate suspension 1 Gram(s) Oral two times a day  tamsulosin 0.4 milliGRAM(s) Oral at bedtime  thiamine 100 milliGRAM(s) Oral daily    MEDICATIONS  (PRN):  acetaminophen     Tablet .. 650 milliGRAM(s) Oral every 6 hours PRN Temp greater or equal to 38C (100.4F), Mild Pain (1 - 3)  chlorproMAZINE    Tablet 10 milliGRAM(s) Oral every 8 hours PRN hiccuping  cyclobenzaprine 5 milliGRAM(s) Oral three times a day PRN Muscle Spasm  HYDROmorphone  Injectable 0.5 milliGRAM(s) IV Push every 4 hours PRN Severe Pain (7 - 10)  melatonin 3 milliGRAM(s) Oral at bedtime PRN Insomnia  ondansetron Injectable 4 milliGRAM(s) IV Push every 8 hours PRN Nausea and/or Vomiting  oxycodone    5 mG/acetaminophen 325 mG 2 Tablet(s) Oral every 4 hours PRN Moderate Pain (4 - 6)      CAPILLARY BLOOD GLUCOSE        I&O's Summary      PHYSICAL EXAM:  Vital Signs Last 24 Hrs  T(C): 36.2 (03 May 2022 03:09), Max: 37.2 (02 May 2022 23:02)  T(F): 97.2 (03 May 2022 03:09), Max: 98.9 (02 May 2022 23:02)  HR: 73 (03 May 2022 03:09) (72 - 84)  BP: 153/74 (03 May 2022 03:09) (143/74 - 153/74)  BP(mean): --  RR: 17 (03 May 2022 03:09) (17 - 18)  SpO2: 100% (03 May 2022 03:09) (99% - 100%)    GENERAL: No acute distress, well-developed  HEAD:  Atraumatic, Normocephalic  EYES: EOMI, PERRLA, conjunctiva and sclera clear  NECK: Supple, no lymphadenopathy, no JVD  CHEST/LUNG: CTAB; No wheezes, rales, or rhonchi  HEART: Regular rate and rhythm; No murmurs, rubs, or gallops  ABDOMEN: Soft, non-tender, non-distended; normal bowel sounds, no organomegaly  EXTREMITIES:  2+ peripheral pulses b/l, No clubbing, cyanosis, or edema  NEUROLOGY: A&O x 3, no focal deficits. Constant hiccup appreciated  SKIN: No rashes or lesions      LABS:                        10.2   13.29 )-----------( 252      ( 02 May 2022 07:08 )             32.6     05-02    132<L>  |  104  |  26<H>  ----------------------------<  90  4.6   |  19<L>  |  2.40<H>    Ca    8.8      02 May 2022 07:08  Phos  3.0     05-02  Mg     1.90     05-02                  RADIOLOGY & ADDITIONAL TESTS:  Results Reviewed:   Imaging Personally Reviewed:  Electrocardiogram Personally Reviewed:    COORDINATION OF CARE:  Care Discussed with Consultants/Other Providers [Y/N]:  Prior or Outpatient Records Reviewed [Y/N]:

## 2022-05-03 NOTE — PROGRESS NOTE ADULT - ASSESSMENT
Impression:    # Dysphagia secondary to esophageal stricture with malpositioned esophageal stent (initially placed 4/19/22) -  Tolerating pureed foods well  s/p endoscopic dilation to 12 mm and repositioning of WallFlex stent (proximal end at 37 cm from incisors) over stricture with suture for stabilization.   # Hematemesis: Resolved at time of upper endoscopy as above; suspect prior bleeding due to esophageal ulceration from stent migration  # Abd pain - Possible due to chronic pancreatitis; unrelated to stricture  # HCV s/p treatment (neg viral load)      Recommendations:  - can transition to PO pantoprazole 40 mg BID (continue for 8 weeks)   - can advance diet to soft diet today and continue upon discharge  - pain control per primary team  - will need repeat EGD in 4 weeks as outpatient for esophageal stent removal; call [phone] ([facility] at [address] [place] [address]) or call [phone] ([facility] at [address] [place] [address])  to make an appointment to schedule procedure.       Randi Marcum PGY-6  Gastroenterology/Hepatology Fellow  Pager #[phone]  E-mail [email or web address]  Call [phone] to speak to answering service for on-call GI fellow 5pm-7am and weekends.

## 2022-05-04 ENCOUNTER — APPOINTMENT (OUTPATIENT)
Dept: NEPHROLOGY | Facility: CLINIC | Age: 62
End: 2022-05-04

## 2022-05-06 ENCOUNTER — EMERGENCY (EMERGENCY)
Facility: HOSPITAL | Age: 62
LOS: 0 days | Discharge: ANOTHER HOSPITAL | End: 2022-05-07
Attending: STUDENT IN AN ORGANIZED HEALTH CARE EDUCATION/TRAINING PROGRAM
Payer: COMMERCIAL

## 2022-05-06 VITALS
DIASTOLIC BLOOD PRESSURE: 71 MMHG | RESPIRATION RATE: 18 BRPM | HEIGHT: 74 IN | WEIGHT: 126.99 LBS | OXYGEN SATURATION: 96 % | SYSTOLIC BLOOD PRESSURE: 103 MMHG | TEMPERATURE: 98 F | HEART RATE: 93 BPM

## 2022-05-06 DIAGNOSIS — R10.13 EPIGASTRIC PAIN: ICD-10-CM

## 2022-05-06 DIAGNOSIS — Z20.822 CONTACT WITH AND (SUSPECTED) EXPOSURE TO COVID-19: ICD-10-CM

## 2022-05-06 DIAGNOSIS — I10 ESSENTIAL (PRIMARY) HYPERTENSION: ICD-10-CM

## 2022-05-06 DIAGNOSIS — Z87.19 PERSONAL HISTORY OF OTHER DISEASES OF THE DIGESTIVE SYSTEM: ICD-10-CM

## 2022-05-06 DIAGNOSIS — K86.1 OTHER CHRONIC PANCREATITIS: ICD-10-CM

## 2022-05-06 DIAGNOSIS — M10.9 GOUT, UNSPECIFIED: ICD-10-CM

## 2022-05-06 DIAGNOSIS — K25.5 CHRONIC OR UNSPECIFIED GASTRIC ULCER WITH PERFORATION: Chronic | ICD-10-CM

## 2022-05-06 LAB
ALBUMIN SERPL ELPH-MCNC: 2.7 G/DL — LOW (ref 3.3–5)
ALP SERPL-CCNC: 94 U/L — SIGNIFICANT CHANGE UP (ref 40–120)
ALT FLD-CCNC: 16 U/L — SIGNIFICANT CHANGE UP (ref 12–78)
ANION GAP SERPL CALC-SCNC: 11 MMOL/L — SIGNIFICANT CHANGE UP (ref 5–17)
APTT BLD: 31.8 SEC — SIGNIFICANT CHANGE UP (ref 27.5–35.5)
AST SERPL-CCNC: 16 U/L — SIGNIFICANT CHANGE UP (ref 15–37)
BASOPHILS # BLD AUTO: 0.1 K/UL — SIGNIFICANT CHANGE UP (ref 0–0.2)
BASOPHILS NFR BLD AUTO: 1.1 % — SIGNIFICANT CHANGE UP (ref 0–2)
BILIRUB SERPL-MCNC: 0.2 MG/DL — SIGNIFICANT CHANGE UP (ref 0.2–1.2)
BUN SERPL-MCNC: 39 MG/DL — HIGH (ref 7–23)
CALCIUM SERPL-MCNC: 8.4 MG/DL — LOW (ref 8.5–10.1)
CHLORIDE SERPL-SCNC: 104 MMOL/L — SIGNIFICANT CHANGE UP (ref 96–108)
CK MB BLD-MCNC: 2.6 % — SIGNIFICANT CHANGE UP (ref 0–3.5)
CK MB CFR SERPL CALC: 2.7 NG/ML — SIGNIFICANT CHANGE UP (ref 0.5–3.6)
CK SERPL-CCNC: 105 U/L — SIGNIFICANT CHANGE UP (ref 26–308)
CO2 SERPL-SCNC: 27 MMOL/L — SIGNIFICANT CHANGE UP (ref 22–31)
CREAT SERPL-MCNC: 3.12 MG/DL — HIGH (ref 0.5–1.3)
EGFR: 22 ML/MIN/1.73M2 — LOW
EOSINOPHIL # BLD AUTO: 0 K/UL — SIGNIFICANT CHANGE UP (ref 0–0.5)
EOSINOPHIL NFR BLD AUTO: 0 % — SIGNIFICANT CHANGE UP (ref 0–6)
GLUCOSE SERPL-MCNC: 134 MG/DL — HIGH (ref 70–99)
HCT VFR BLD CALC: 36.5 % — LOW (ref 39–50)
HGB BLD-MCNC: 12.3 G/DL — LOW (ref 13–17)
IMM GRANULOCYTES NFR BLD AUTO: 0.4 % — SIGNIFICANT CHANGE UP (ref 0–1.5)
INR BLD: 0.99 RATIO — SIGNIFICANT CHANGE UP (ref 0.88–1.16)
LACTATE SERPL-SCNC: 1.8 MMOL/L — SIGNIFICANT CHANGE UP (ref 0.7–2)
LACTATE SERPL-SCNC: 2.2 MMOL/L — HIGH (ref 0.7–2)
LIDOCAIN IGE QN: 149 U/L — SIGNIFICANT CHANGE UP (ref 73–393)
LYMPHOCYTES # BLD AUTO: 1.98 K/UL — SIGNIFICANT CHANGE UP (ref 1–3.3)
LYMPHOCYTES # BLD AUTO: 22 % — SIGNIFICANT CHANGE UP (ref 13–44)
MCHC RBC-ENTMCNC: 29.1 PG — SIGNIFICANT CHANGE UP (ref 27–34)
MCHC RBC-ENTMCNC: 33.7 G/DL — SIGNIFICANT CHANGE UP (ref 32–36)
MCV RBC AUTO: 86.5 FL — SIGNIFICANT CHANGE UP (ref 80–100)
MONOCYTES # BLD AUTO: 0.54 K/UL — SIGNIFICANT CHANGE UP (ref 0–0.9)
MONOCYTES NFR BLD AUTO: 6 % — SIGNIFICANT CHANGE UP (ref 2–14)
NEUTROPHILS # BLD AUTO: 6.34 K/UL — SIGNIFICANT CHANGE UP (ref 1.8–7.4)
NEUTROPHILS NFR BLD AUTO: 70.5 % — SIGNIFICANT CHANGE UP (ref 43–77)
NRBC # BLD: 0 /100 WBCS — SIGNIFICANT CHANGE UP (ref 0–0)
PLATELET # BLD AUTO: 434 K/UL — HIGH (ref 150–400)
POTASSIUM SERPL-MCNC: 3.8 MMOL/L — SIGNIFICANT CHANGE UP (ref 3.5–5.3)
POTASSIUM SERPL-SCNC: 3.8 MMOL/L — SIGNIFICANT CHANGE UP (ref 3.5–5.3)
PROT SERPL-MCNC: 6.5 GM/DL — SIGNIFICANT CHANGE UP (ref 6–8.3)
PROTHROM AB SERPL-ACNC: 11.8 SEC — SIGNIFICANT CHANGE UP (ref 10.5–13.4)
RBC # BLD: 4.22 M/UL — SIGNIFICANT CHANGE UP (ref 4.2–5.8)
RBC # FLD: 17.3 % — HIGH (ref 10.3–14.5)
SODIUM SERPL-SCNC: 142 MMOL/L — SIGNIFICANT CHANGE UP (ref 135–145)
TROPONIN I, HIGH SENSITIVITY RESULT: 3.4 NG/L — SIGNIFICANT CHANGE UP
WBC # BLD: 9 K/UL — SIGNIFICANT CHANGE UP (ref 3.8–10.5)
WBC # FLD AUTO: 9 K/UL — SIGNIFICANT CHANGE UP (ref 3.8–10.5)

## 2022-05-06 PROCEDURE — 74176 CT ABD & PELVIS W/O CONTRAST: CPT | Mod: 26,MA

## 2022-05-06 PROCEDURE — 99284 EMERGENCY DEPT VISIT MOD MDM: CPT

## 2022-05-06 RX ORDER — METOCLOPRAMIDE HCL 10 MG
10 TABLET ORAL ONCE
Refills: 0 | Status: COMPLETED | OUTPATIENT
Start: 2022-05-06 | End: 2022-05-06

## 2022-05-06 RX ORDER — MORPHINE SULFATE 50 MG/1
4 CAPSULE, EXTENDED RELEASE ORAL ONCE
Refills: 0 | Status: DISCONTINUED | OUTPATIENT
Start: 2022-05-06 | End: 2022-05-06

## 2022-05-06 RX ORDER — ACETAMINOPHEN 500 MG
1000 TABLET ORAL ONCE
Refills: 0 | Status: COMPLETED | OUTPATIENT
Start: 2022-05-06 | End: 2022-05-06

## 2022-05-06 RX ORDER — SODIUM CHLORIDE 9 MG/ML
1000 INJECTION INTRAMUSCULAR; INTRAVENOUS; SUBCUTANEOUS ONCE
Refills: 0 | Status: COMPLETED | OUTPATIENT
Start: 2022-05-06 | End: 2022-05-06

## 2022-05-06 RX ORDER — SODIUM CHLORIDE 9 MG/ML
1000 INJECTION INTRAMUSCULAR; INTRAVENOUS; SUBCUTANEOUS ONCE
Refills: 0 | Status: DISCONTINUED | OUTPATIENT
Start: 2022-05-06 | End: 2022-05-06

## 2022-05-06 RX ADMIN — Medication 10 MILLIGRAM(S): at 22:13

## 2022-05-06 RX ADMIN — Medication 1000 MILLIGRAM(S): at 23:12

## 2022-05-06 RX ADMIN — SODIUM CHLORIDE 1000 MILLILITER(S): 9 INJECTION INTRAMUSCULAR; INTRAVENOUS; SUBCUTANEOUS at 19:58

## 2022-05-06 RX ADMIN — MORPHINE SULFATE 4 MILLIGRAM(S): 50 CAPSULE, EXTENDED RELEASE ORAL at 21:18

## 2022-05-06 RX ADMIN — MORPHINE SULFATE 4 MILLIGRAM(S): 50 CAPSULE, EXTENDED RELEASE ORAL at 19:59

## 2022-05-06 RX ADMIN — Medication 400 MILLIGRAM(S): at 22:28

## 2022-05-06 NOTE — ED ADULT TRIAGE NOTE - CHIEF COMPLAINT QUOTE
Abd pain in the pancreas H/O Stent in pancreas Abd pain in the pancreas H/O Stent in pancreas, general weakness stooped over walking, carrying a cup to spit into, wearing another hospital id band

## 2022-05-06 NOTE — ED PROVIDER NOTE - ATTENDING APP SHARED VISIT CONTRIBUTION OF CARE
62 yo M presenting with abdominal pain in setting of known chronic pancreatitis, esophagitis with stricture- recent hospitalization. pt states pain has been intolerable at home and worse today. CT shows 1.6 cm stone in pancreas transfer for possible ERCP

## 2022-05-06 NOTE — ED ADULT NURSE NOTE - OBJECTIVE STATEMENT
61 year old male hx of alcohol abuse, pancreatitis comes in with epigastric pain x  1 week that travels to chest. Denies any n/v/d.

## 2022-05-06 NOTE — ED ADULT NURSE NOTE - CHIEF COMPLAINT QUOTE
Abd pain in the pancreas H/O Stent in pancreas, general weakness stooped over walking, carrying a cup to spit into, wearing another hospital id band

## 2022-05-06 NOTE — ED PROVIDER NOTE - CLINICAL SUMMARY MEDICAL DECISION MAKING FREE TEXT BOX
Patient with abdominal pain, diffuse, 61 M hx esophagitis with stricture s/p stent and chronic pancreatitis, pain unresolved and worsened since discharge from hospital, not tolerating PO-- labs, CT reassess

## 2022-05-06 NOTE — ED PROVIDER NOTE - PROGRESS NOTE DETAILS
Jessica: 1.6cm stone seen at main pacreatic duct, transfer for GI/admission for pain control. Discussed with S hospitalist-- no ERCP @ S

## 2022-05-06 NOTE — ED PROVIDER NOTE - OBJECTIVE STATEMENT
61M alcohol abuse (sober x1 year), chronic pancreatitis, chronic esophagitis with stricture (s/p stent 4/19/22), here with epigastric pain x 1 week, intermittently radiating to the chest. He reports pancreatic stent placed 2 weeks ago at [facility], has had pain since that time. NO nausea, vomiting, diarrhea. Denies shortness of breath. Normal BMs. NO urinary symptoms.

## 2022-05-07 ENCOUNTER — INPATIENT (INPATIENT)
Facility: HOSPITAL | Age: 62
LOS: 2 days | Discharge: HOME | End: 2022-05-10
Attending: STUDENT IN AN ORGANIZED HEALTH CARE EDUCATION/TRAINING PROGRAM | Admitting: STUDENT IN AN ORGANIZED HEALTH CARE EDUCATION/TRAINING PROGRAM
Payer: MEDICAID

## 2022-05-07 VITALS
SYSTOLIC BLOOD PRESSURE: 118 MMHG | RESPIRATION RATE: 18 BRPM | TEMPERATURE: 98 F | OXYGEN SATURATION: 98 % | HEART RATE: 70 BPM | DIASTOLIC BLOOD PRESSURE: 77 MMHG

## 2022-05-07 VITALS
SYSTOLIC BLOOD PRESSURE: 144 MMHG | DIASTOLIC BLOOD PRESSURE: 82 MMHG | TEMPERATURE: 98 F | HEART RATE: 76 BPM | OXYGEN SATURATION: 100 % | RESPIRATION RATE: 17 BRPM

## 2022-05-07 DIAGNOSIS — K86.1 OTHER CHRONIC PANCREATITIS: ICD-10-CM

## 2022-05-07 DIAGNOSIS — K25.5 CHRONIC OR UNSPECIFIED GASTRIC ULCER WITH PERFORATION: Chronic | ICD-10-CM

## 2022-05-07 DIAGNOSIS — F10.11 ALCOHOL ABUSE, IN REMISSION: ICD-10-CM

## 2022-05-07 DIAGNOSIS — R10.13 EPIGASTRIC PAIN: ICD-10-CM

## 2022-05-07 DIAGNOSIS — R10.9 UNSPECIFIED ABDOMINAL PAIN: ICD-10-CM

## 2022-05-07 DIAGNOSIS — Z29.9 ENCOUNTER FOR PROPHYLACTIC MEASURES, UNSPECIFIED: ICD-10-CM

## 2022-05-07 DIAGNOSIS — I10 ESSENTIAL (PRIMARY) HYPERTENSION: ICD-10-CM

## 2022-05-07 DIAGNOSIS — Z87.19 PERSONAL HISTORY OF OTHER DISEASES OF THE DIGESTIVE SYSTEM: ICD-10-CM

## 2022-05-07 DIAGNOSIS — N18.9 CHRONIC KIDNEY DISEASE, UNSPECIFIED: ICD-10-CM

## 2022-05-07 LAB
ALBUMIN SERPL ELPH-MCNC: 2.8 G/DL — LOW (ref 3.3–5)
ALP SERPL-CCNC: 82 U/L — SIGNIFICANT CHANGE UP (ref 40–120)
ALT FLD-CCNC: 10 U/L — SIGNIFICANT CHANGE UP (ref 4–41)
ANION GAP SERPL CALC-SCNC: 13 MMOL/L — SIGNIFICANT CHANGE UP (ref 7–14)
AST SERPL-CCNC: 17 U/L — SIGNIFICANT CHANGE UP (ref 4–40)
BASOPHILS # BLD AUTO: 0 K/UL — SIGNIFICANT CHANGE UP (ref 0–0.2)
BASOPHILS NFR BLD AUTO: 0 % — SIGNIFICANT CHANGE UP (ref 0–2)
BILIRUB SERPL-MCNC: <0.2 MG/DL — SIGNIFICANT CHANGE UP (ref 0.2–1.2)
BUN SERPL-MCNC: 33 MG/DL — HIGH (ref 7–23)
CALCIUM SERPL-MCNC: 8.2 MG/DL — LOW (ref 8.4–10.5)
CHLORIDE SERPL-SCNC: 104 MMOL/L — SIGNIFICANT CHANGE UP (ref 98–107)
CO2 SERPL-SCNC: 21 MMOL/L — LOW (ref 22–31)
CREAT SERPL-MCNC: 2.56 MG/DL — HIGH (ref 0.5–1.3)
EGFR: 28 ML/MIN/1.73M2 — LOW
EOSINOPHIL # BLD AUTO: 0.08 K/UL — SIGNIFICANT CHANGE UP (ref 0–0.5)
EOSINOPHIL NFR BLD AUTO: 0.9 % — SIGNIFICANT CHANGE UP (ref 0–6)
FLUAV AG NPH QL: SIGNIFICANT CHANGE UP
FLUBV AG NPH QL: SIGNIFICANT CHANGE UP
GLUCOSE SERPL-MCNC: 91 MG/DL — SIGNIFICANT CHANGE UP (ref 70–99)
HCT VFR BLD CALC: 32.6 % — LOW (ref 39–50)
HGB BLD-MCNC: 10.4 G/DL — LOW (ref 13–17)
IANC: 5.28 K/UL — SIGNIFICANT CHANGE UP (ref 1.8–7.4)
LIDOCAIN IGE QN: 32 U/L — SIGNIFICANT CHANGE UP (ref 7–60)
LYMPHOCYTES # BLD AUTO: 1.73 K/UL — SIGNIFICANT CHANGE UP (ref 1–3.3)
LYMPHOCYTES # BLD AUTO: 19.5 % — SIGNIFICANT CHANGE UP (ref 13–44)
MCHC RBC-ENTMCNC: 28.9 PG — SIGNIFICANT CHANGE UP (ref 27–34)
MCHC RBC-ENTMCNC: 31.9 GM/DL — LOW (ref 32–36)
MCV RBC AUTO: 90.6 FL — SIGNIFICANT CHANGE UP (ref 80–100)
MONOCYTES # BLD AUTO: 0.31 K/UL — SIGNIFICANT CHANGE UP (ref 0–0.9)
MONOCYTES NFR BLD AUTO: 3.5 % — SIGNIFICANT CHANGE UP (ref 2–14)
NEUTROPHILS # BLD AUTO: 6.66 K/UL — SIGNIFICANT CHANGE UP (ref 1.8–7.4)
NEUTROPHILS NFR BLD AUTO: 75.2 % — SIGNIFICANT CHANGE UP (ref 43–77)
PLATELET # BLD AUTO: 315 K/UL — SIGNIFICANT CHANGE UP (ref 150–400)
POTASSIUM SERPL-MCNC: 4 MMOL/L — SIGNIFICANT CHANGE UP (ref 3.5–5.3)
POTASSIUM SERPL-SCNC: 4 MMOL/L — SIGNIFICANT CHANGE UP (ref 3.5–5.3)
PROT SERPL-MCNC: 5.4 G/DL — LOW (ref 6–8.3)
RBC # BLD: 3.6 M/UL — LOW (ref 4.2–5.8)
RBC # FLD: 17.5 % — HIGH (ref 10.3–14.5)
SARS-COV-2 RNA SPEC QL NAA+PROBE: SIGNIFICANT CHANGE UP
SODIUM SERPL-SCNC: 138 MMOL/L — SIGNIFICANT CHANGE UP (ref 135–145)
WBC # BLD: 8.86 K/UL — SIGNIFICANT CHANGE UP (ref 3.8–10.5)
WBC # FLD AUTO: 8.86 K/UL — SIGNIFICANT CHANGE UP (ref 3.8–10.5)

## 2022-05-07 PROCEDURE — 71045 X-RAY EXAM CHEST 1 VIEW: CPT | Mod: 26

## 2022-05-07 PROCEDURE — 99284 EMERGENCY DEPT VISIT MOD MDM: CPT

## 2022-05-07 PROCEDURE — 99223 1ST HOSP IP/OBS HIGH 75: CPT | Mod: GC

## 2022-05-07 PROCEDURE — 99233 SBSQ HOSP IP/OBS HIGH 50: CPT | Mod: GC

## 2022-05-07 PROCEDURE — 99253 IP/OBS CNSLTJ NEW/EST LOW 45: CPT | Mod: GC

## 2022-05-07 PROCEDURE — 99232 SBSQ HOSP IP/OBS MODERATE 35: CPT | Mod: GC

## 2022-05-07 RX ORDER — OXYCODONE HYDROCHLORIDE 5 MG/1
10 TABLET ORAL EVERY 4 HOURS
Refills: 0 | Status: DISCONTINUED | OUTPATIENT
Start: 2022-05-07 | End: 2022-05-08

## 2022-05-07 RX ORDER — HEPARIN SODIUM 5000 [USP'U]/ML
5000 INJECTION INTRAVENOUS; SUBCUTANEOUS EVERY 12 HOURS
Refills: 0 | Status: DISCONTINUED | OUTPATIENT
Start: 2022-05-07 | End: 2022-05-10

## 2022-05-07 RX ORDER — POLYETHYLENE GLYCOL 3350 17 G/17G
17 POWDER, FOR SOLUTION ORAL
Refills: 0 | Status: DISCONTINUED | OUTPATIENT
Start: 2022-05-07 | End: 2022-05-10

## 2022-05-07 RX ORDER — LANOLIN ALCOHOL/MO/W.PET/CERES
3 CREAM (GRAM) TOPICAL AT BEDTIME
Refills: 0 | Status: DISCONTINUED | OUTPATIENT
Start: 2022-05-07 | End: 2022-05-10

## 2022-05-07 RX ORDER — MORPHINE SULFATE 50 MG/1
4 CAPSULE, EXTENDED RELEASE ORAL ONCE
Refills: 0 | Status: DISCONTINUED | OUTPATIENT
Start: 2022-05-07 | End: 2022-05-07

## 2022-05-07 RX ORDER — LIPASE/PROTEASE/AMYLASE 16-48-48K
2 CAPSULE,DELAYED RELEASE (ENTERIC COATED) ORAL
Refills: 0 | Status: DISCONTINUED | OUTPATIENT
Start: 2022-05-07 | End: 2022-05-10

## 2022-05-07 RX ORDER — THIAMINE MONONITRATE (VIT B1) 100 MG
100 TABLET ORAL DAILY
Refills: 0 | Status: DISCONTINUED | OUTPATIENT
Start: 2022-05-07 | End: 2022-05-10

## 2022-05-07 RX ORDER — SENNA PLUS 8.6 MG/1
2 TABLET ORAL AT BEDTIME
Refills: 0 | Status: DISCONTINUED | OUTPATIENT
Start: 2022-05-07 | End: 2022-05-10

## 2022-05-07 RX ORDER — ONDANSETRON 8 MG/1
4 TABLET, FILM COATED ORAL EVERY 8 HOURS
Refills: 0 | Status: DISCONTINUED | OUTPATIENT
Start: 2022-05-07 | End: 2022-05-10

## 2022-05-07 RX ORDER — PANTOPRAZOLE SODIUM 20 MG/1
40 TABLET, DELAYED RELEASE ORAL
Refills: 0 | Status: DISCONTINUED | OUTPATIENT
Start: 2022-05-07 | End: 2022-05-10

## 2022-05-07 RX ORDER — FOLIC ACID 0.8 MG
1 TABLET ORAL DAILY
Refills: 0 | Status: DISCONTINUED | OUTPATIENT
Start: 2022-05-07 | End: 2022-05-10

## 2022-05-07 RX ORDER — OXYCODONE HYDROCHLORIDE 5 MG/1
5 TABLET ORAL ONCE
Refills: 0 | Status: DISCONTINUED | OUTPATIENT
Start: 2022-05-07 | End: 2022-05-07

## 2022-05-07 RX ORDER — SODIUM CHLORIDE 9 MG/ML
1000 INJECTION, SOLUTION INTRAVENOUS
Refills: 0 | Status: DISCONTINUED | OUTPATIENT
Start: 2022-05-07 | End: 2022-05-07

## 2022-05-07 RX ORDER — KETOROLAC TROMETHAMINE 30 MG/ML
30 SYRINGE (ML) INJECTION ONCE
Refills: 0 | Status: DISCONTINUED | OUTPATIENT
Start: 2022-05-07 | End: 2022-05-07

## 2022-05-07 RX ORDER — ACETAMINOPHEN 500 MG
650 TABLET ORAL EVERY 6 HOURS
Refills: 0 | Status: DISCONTINUED | OUTPATIENT
Start: 2022-05-07 | End: 2022-05-10

## 2022-05-07 RX ORDER — SODIUM CHLORIDE 9 MG/ML
1000 INJECTION INTRAMUSCULAR; INTRAVENOUS; SUBCUTANEOUS ONCE
Refills: 0 | Status: COMPLETED | OUTPATIENT
Start: 2022-05-07 | End: 2022-05-07

## 2022-05-07 RX ORDER — CYCLOBENZAPRINE HYDROCHLORIDE 10 MG/1
5 TABLET, FILM COATED ORAL ONCE
Refills: 0 | Status: COMPLETED | OUTPATIENT
Start: 2022-05-07 | End: 2022-05-07

## 2022-05-07 RX ADMIN — MORPHINE SULFATE 4 MILLIGRAM(S): 50 CAPSULE, EXTENDED RELEASE ORAL at 01:49

## 2022-05-07 RX ADMIN — SODIUM CHLORIDE 100 MILLILITER(S): 9 INJECTION, SOLUTION INTRAVENOUS at 14:22

## 2022-05-07 RX ADMIN — Medication 1 MILLIGRAM(S): at 19:14

## 2022-05-07 RX ADMIN — SODIUM CHLORIDE 1000 MILLILITER(S): 9 INJECTION INTRAMUSCULAR; INTRAVENOUS; SUBCUTANEOUS at 00:48

## 2022-05-07 RX ADMIN — SENNA PLUS 2 TABLET(S): 8.6 TABLET ORAL at 21:54

## 2022-05-07 RX ADMIN — PANTOPRAZOLE SODIUM 40 MILLIGRAM(S): 20 TABLET, DELAYED RELEASE ORAL at 19:14

## 2022-05-07 RX ADMIN — Medication 30 MILLIGRAM(S): at 09:17

## 2022-05-07 RX ADMIN — MORPHINE SULFATE 4 MILLIGRAM(S): 50 CAPSULE, EXTENDED RELEASE ORAL at 00:48

## 2022-05-07 RX ADMIN — Medication 2 CAPSULE(S): at 19:14

## 2022-05-07 RX ADMIN — OXYCODONE HYDROCHLORIDE 10 MILLIGRAM(S): 5 TABLET ORAL at 19:53

## 2022-05-07 RX ADMIN — OXYCODONE HYDROCHLORIDE 10 MILLIGRAM(S): 5 TABLET ORAL at 16:40

## 2022-05-07 RX ADMIN — MORPHINE SULFATE 4 MILLIGRAM(S): 50 CAPSULE, EXTENDED RELEASE ORAL at 14:53

## 2022-05-07 RX ADMIN — OXYCODONE HYDROCHLORIDE 10 MILLIGRAM(S): 5 TABLET ORAL at 23:33

## 2022-05-07 RX ADMIN — MORPHINE SULFATE 4 MILLIGRAM(S): 50 CAPSULE, EXTENDED RELEASE ORAL at 12:12

## 2022-05-07 RX ADMIN — OXYCODONE HYDROCHLORIDE 10 MILLIGRAM(S): 5 TABLET ORAL at 19:15

## 2022-05-07 RX ADMIN — OXYCODONE HYDROCHLORIDE 10 MILLIGRAM(S): 5 TABLET ORAL at 14:30

## 2022-05-07 RX ADMIN — Medication 100 MILLIGRAM(S): at 19:14

## 2022-05-07 NOTE — CONSULT NOTE ADULT - ATTENDING COMMENTS
As above  Here with abdominal pain likely in setting of chronic calcific pancreatitis  No evidence of acute inflammation  Can obtain MRCP to eval panc duct and need for future endoscopic interventions/lithitripsy/drainage procedures    Thank you for this interesting consult.  Please call the advanced GI service with any questions or concerns.

## 2022-05-07 NOTE — H&P ADULT - PROBLEM SELECTOR PLAN 1
CT negative for acute pancreatitis, lipase wnl. PD duct stone however unclear if actually present given diffuse pancreatic calcifications. No dysphagia or globus sensation.   - GI following  - Will obtain MRCP for better characterization of possible PD duct stone   - Zofran, Maalox PRN for nausea and dyspepsia  - Diet as tolerated Concern for acute on chronic pancreatitis vs possible PD duct stone. lipase wnl. PD duct stone however unclear if actually present given diffuse pancreatic calcifications. No dysphagia or globus sensation.   - GI following  - Will obtain MRCP for better characterization of possible PD duct stone   - Zofran, Maalox PRN for nausea and dyspepsia  - Diet as tolerated Concern for acute on chronic pancreatitis vs possible PD duct stone. lipase wnl. PD duct stone however unclear if actually present given diffuse pancreatic calcifications. No dysphagia or globus sensation.   - GI following  - Will obtain MRCP for better characterization of possible PD duct stone   - Zofran, Maalox PRN for nausea and dyspepsia    - Qtc 449 on 4/26  - Diet as tolerated

## 2022-05-07 NOTE — H&P ADULT - NSHPPHYSICALEXAM_GEN_ALL_CORE
VITALS:   T(C): 36.6 (05-07-22 @ 13:00), Max: 36.8 (05-07-22 @ 09:49)  HR: 69 (05-07-22 @ 13:00) (68 - 70)  BP: 134/67 (05-07-22 @ 13:00) (118/77 - 134/67)  RR: 17 (05-07-22 @ 13:00) (17 - 19)  SpO2: 96% (05-07-22 @ 13:00) (96% - 100%)    GENERAL: NAD, lying in bed comfortably  HEAD:  Atraumatic, Normocephalic  EYES: EOMI, PERRLA, conjunctiva and sclera clear  ENT: Moist mucous membranes  NECK: Supple, No JVD  CHEST/LUNG: CTABL; No rales, rhonchi, wheezing, or rubs. Unlabored respirations  HEART: RRR. No M/R/G  ABDOMEN: Soft, nontender, non-distended, normoactive BS. No hepatomegaly  EXTREMITIES:  2+ Peripheral Pulses, brisk capillary refill. No clubbing, cyanosis, or edema  NERVOUS SYSTEM:  Alert & Oriented X3, speech clear. No deficits, CN II-XII intact. Normal sensation   MSK: FROM all 4 extremities, full and equal strength  PSYCH: Normal affect, normal speech, normal behavior  SKIN: No rashes or lesions VITALS:   T(C): 36.6 (05-07-22 @ 13:00), Max: 36.8 (05-07-22 @ 09:49)  HR: 69 (05-07-22 @ 13:00) (68 - 70)  BP: 134/67 (05-07-22 @ 13:00) (118/77 - 134/67)  RR: 17 (05-07-22 @ 13:00) (17 - 19)  SpO2: 96% (05-07-22 @ 13:00) (96% - 100%)    GENERAL: NAD, lying in bed comfortably, very thin  HEAD:  Atraumatic, Normocephalic  EYES: EOMI, PERRLA, conjunctiva and sclera clear  ENT: Moist mucous membranes  NECK: Supple, No JVD  CHEST/LUNG: CTABL; No rales, rhonchi, wheezing, or rubs. Unlabored respirations  HEART: RRR. No M/R/G  ABDOMEN: Soft, mildly tender to palpation  EXTREMITIES:  2+ Peripheral Pulses, brisk capillary refill. No clubbing, cyanosis, or edema  NERVOUS SYSTEM:  Alert & Oriented X3, speech clear. No deficits, CN II-XII intact. Normal sensation   MSK: FROM all 4 extremities, full and equal strength  PSYCH: Normal affect, normal speech, normal behavior  SKIN: No rashes or lesions

## 2022-05-07 NOTE — CONSULT NOTE ADULT - ASSESSMENT
61 year old male with history of chronic pancreatitis, HCV s/p SVR, and esophageal stricture s/p stent placement 4/19/2022 c/b need for reposition & suture 4/29/2022 who presents with abdominal pain.    # Acute on chronic pancreatitis  # Chronic PD dilatation  # Concern for 1.6cm PD stone on imaging  # Esophageal stricture s/p stent placement 4/19/2022 c/b need for reposition & suture 4/29/2022  # HCV s/p SVR  History of esophageal stricture requiring stent placement on 4/19/2022.  Post-procedural course c/b hematemesis with stent malposition requiring EGD for stent repositioning and suture on 4/29/2022 with sloughing of esophageal mucosa noted.  After discharge, patient subsequently presents to [facility] with epigastric pain spreading "all over his body," including his back.  Patient subsequently transferred to [facility] ED after CT A/P reveals chronic pancreatitis with significant calcifications, stable PD dilation, which "may be due to 1.6cm PD stone."      Recommendations:  -trend clinical symptoms, exam findings, vital signs, CBC, CMP  -given questionable presence of PD stone, would obtain MRCP to further characterize ducts and/or stone disease  -IVF resuscitation and supportive care for acute on chronic pancreatitis  -no issues with recent stent placement and reposition given lack of dysphagia or evidence of overt GI bleeding    Recommendations incomplete until finalized by attending signature/attestation to note.    Morales Meier, PGY-4  GI/Hepatology Fellow    MONDAY-FRIDAY 8AM-5PM:  Pager# [phone] ([facility]) or [phone] ([facility])    NON-URGENT CONSULTS:  Please email [email or web address]  AT NIGHT AND ON WEEKENDS:  Contact on-call GI fellow via answering service ([phone]) from 5pm-8am and on weekends/holidays

## 2022-05-07 NOTE — H&P ADULT - ASSESSMENT
61M with pmh alcohol abuse (sober x1 year), chronic pancreatitis, chronic esophagitis with stricture (s/p stent 4/19/22), CKD4 w/ recent admission for esophageal stricture s/p stent placement (4/19), now coming in as a transfer from  for worsening upper abdominal pain, found to have a possible pancreatic duct stone on CT, pending MRCP.  61M with pmh alcohol abuse (sober x1 year), chronic pancreatitis, chronic esophagitis with stricture (s/p stent 4/19/22), CKD4 w/ recent admission for esophageal stricture s/p stent placement (4/19), now coming in as a transfer from  for worsening upper abdominal pain, found to have a possible pancreatic duct stone on CT, pending MRCP.     FYI: patient has other chart MRN# [id number]

## 2022-05-07 NOTE — ED ADULT NURSE NOTE - OBJECTIVE STATEMENT
Receive pt. in ER room 16 alert and oriented x 4, transferred from [facility] for ERCP as per transfer documents. Pt. c/o upper abdominal pain. Stated " I have pain in my abdomen for over a  week  and I always have to spit". No c/o nausea, arrived with right lower arm saline lock 20 gauge, call bell place within reach.

## 2022-05-07 NOTE — H&P ADULT - PROBLEM SELECTOR PLAN 2
Hx chronic pancreatitis w/ pancreatic insufficiency  - Continue home oxycodone. Hx chronic pancreatitis w/ pancreatic insufficiency  - Continue home oxycodone 10 mg Q4hrs PRN Hx chronic pancreatitis w/ pancreatic insufficiency  - Continue home oxycodone 10 mg Q4hrs PRN  - Continue Creon

## 2022-05-07 NOTE — H&P ADULT - NSHPLABSRESULTS_GEN_ALL_CORE
10.4   8.86  )-----------( 315      ( 07 May 2022 11:31 )             32.6     05-07    138  |  104  |  33<H>  ----------------------------<  91  4.0   |  21<L>  |  2.56<H>    Ca    8.2<L>      07 May 2022 11:31    TPro  5.4<L>  /  Alb  2.8<L>  /  TBili  <0.2  /  DBili  x   /  AST  17  /  ALT  10  /  AlkPhos  82  05-07          LIVER FUNCTIONS - ( 07 May 2022 11:31 )  Alb: 2.8 g/dL / Pro: 5.4 g/dL / ALK PHOS: 82 U/L / ALT: 10 U/L / AST: 17 U/L / GGT: x             CT (5/6/2022):    LIVER: Within normal limits.  BILE DUCTS: Normal caliber.  GALLBLADDER: Within normal limits.  SPLEEN: Within normal limits.  PANCREAS: Reidentified coarse calcifications throughout the pancreas,   compatible with chronic pancreatitis. Stable dilatation of the main   pancreatic duct in the body and tail may be due to a possible 1.6 cm   stone in the main pancreatic duct (2:38). Atrophic pancreas. No CT   evidence for acute pancreatitis.    ADRENALS: Within normal limits.  KIDNEYS/URETERS: No hydronephrosis. Left renal cysts.  BLADDER: Within normal limits.  REPRODUCTIVE ORGANS: The prostate is enlarged.    BOWEL: Redemonstrated mural thickening of the distal esophagus and   gastroesophageal junction with partial visualization of esophageal/GE   junction stent. Correlate with prior EGD. No bowel obstruction. Normal   appendix.  PERITONEUM: Interval mild ascites.  VESSELS:  Atherosclerotic changes.  RETROPERITONEUM: No lymphadenopathy.  ABDOMINAL WALL: Within normal limits.  BONES: Within normal limits.    IMPRESSION:    Redemonstrated mural thickening of the distal esophagus and   gastroesophageal junction with partial visualization of esophageal/GE   junction stent. Correlate with prior EGD.    Reidentified findings of chronic pancreatitis. Stable dilatation of the   main pancreatic duct in the body and tail may be due to a possible 1.6 cm   stone in the main pancreatic duct (2:38).    Interval mild ascites.

## 2022-05-07 NOTE — H&P ADULT - PROBLEM SELECTOR PLAN 3
Hx esophageal stricture s/p dilation and stent placement on 4/19.   - Protonix 40 mg BID for 8 weeks Hx esophageal stricture s/p dilation and stent placement on 4/19. No signs of dysphagia or odynophagia to be concerned for malpositioned stent.   - Protonix 40 mg BID for 8 weeks

## 2022-05-07 NOTE — H&P ADULT - NSHPSOCIALHISTORY_GEN_ALL_CORE
Patient is a former smoker for 40 years, quit a year ago. Patient also has a hx of etoh abuse, however reports being sober for past year.

## 2022-05-07 NOTE — H&P ADULT - NSHPREVIEWOFSYSTEMS_GEN_ALL_CORE
REVIEW OF SYSTEMS:    CONSTITUTIONAL: No weakness, fevers or chills  EYES: No vision changes  ENT: No vertigo or throat pain   NECK: No pain or stiffness  RESPIRATORY: No cough, wheezing, hemoptysis; No shortness of breath  CARDIOVASCULAR: No chest pain or palpitations  GASTROINTESTINAL: Positive for abdominal pain   GENITOURINARY: No dysuria, frequency or hematuria  NEUROLOGICAL: No numbness or weakness  PSYCH: No anxiety, depression, or behavior changes  All other review of systems is negative unless indicated above.

## 2022-05-07 NOTE — H&P ADULT - ATTENDING COMMENTS
61M with pmh alcohol abuse (sober x1 year), chronic pancreatitis, chronic esophagitis with stricture (s/p stent 4/19/22), CKD4 w/ recent admission for esophageal stricture s/p stent placement (4/19), now coming in as a transfer from  for worsening upper abdominal pain, possibly in setting of acute on chronic pancreatitis with possible pancreatic calcifications vs stone.    GI evaluated; suggest MRCP before proceeding with a possible ERCP.     Patient appears in NAD; doing well. Eating without pain. No vomiting.     He stated to me that he ran out of oxycodone at home, but was prescribed 7 days course of oxy. Pt told resident that he had not run out. Suspect possible confabulation; alternatively, pt may be taking oxy more frequently than prescribed.     Suspect perhaps pt is experiencing his chronic pancreatitis pain; which he does endorse he has. Will  continue home dose of oxy and monitor for pain control.     Remainder of problems as above.
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
## 2022-05-07 NOTE — ED PROVIDER NOTE - ATTENDING APP SHARED VISIT CONTRIBUTION OF CARE
agree with NP note    "61 year-old male, history of HTN, CKD, ETOH abuse (sober x 1year), chronic pancreatitis, esophageal stricture with stent (04/19/22), Hep C, gout, transferred from [facility] for GI evaluation. Patient reports chronic upper abdominal pain x 4 years that got worse in the last few days. Pain is sharp, radiating to midsternal area and to back. Similar to his pain in the past. Denies any fever, chills, night sweats, recent illness, N/V/D or any other complaints. GI Dr Dr Hinds.    Other chart MRN# [id number]"    PE:  chronically ill appearing; VSS; CTAB/L; s1 s2 no m/r/g abd soft/NT/ND ext: no edema    Gi fellow at bedside would like pt admitted for ERCP/MRCP likely on Monday  pt well appearing and wants to eat

## 2022-05-07 NOTE — ED PROVIDER NOTE - NSICDXPASTMEDICALHX_GEN_ALL_CORE_FT
PAST MEDICAL HISTORY:  Alcohol abuse     Chronic kidney disease (CKD)     Gout     H/O chronic pancreatitis     HTN (hypertension)

## 2022-05-07 NOTE — ED PROVIDER NOTE - PROGRESS NOTE DETAILS
Pain controlled. GI evaluated. Will admit. Discussed with hospitalist Dr Raza. Patient aware of plan.

## 2022-05-07 NOTE — H&P ADULT - PROBLEM SELECTOR PLAN 6
Remote hx of etoh abuse. Sober for > 1 year  - Continue folate and thiamine Diet: CLD --> advance as tolerated   DVT: held 2/2 recent GIB   Dispo: in patient Diet: Regular  DVT: heparin ppx  Dispo: in patient

## 2022-05-07 NOTE — CONSULT NOTE ADULT - SUBJECTIVE AND OBJECTIVE BOX
HPI:  IMELDA ROBERTSON is a 61 year old male with history of chronic pancreatitis, HCV s/p SVR, and esophageal stricture s/p stent placement 4/19/2022 c/b need for reposition & suture 4/29/2022 who presents with abdominal pain.    Patient has history of esophageal stricture requiring stent placement on 4/19/2022.  Post-procedural course c/b hematemesis with stent malposition requiring EGD for stent repositioning and suture on 4/29/2022 with sloughing of esophageal mucosa noted.  After discharge, patient subsequently presents to [facility] with epigastric pain spreading "all over his body," including his back.  Patient subsequently transferred to [facility] ED after CT A/P reveals chronic pancreatitis with significant calcifications, stable PD dilation, which "may be due to 1.6cm PD stone."  Otherwise, patient denies fevers, chills, dysphagia, odynophagia, abdominal pain, nausea, vomiting, melena, hematemesis, hematochezia.    ROS:   General:  No fevers, chills, night sweats, fatigue  Eyes:  Good vision, no reported pain  ENT:  No sore throat, pain, runny nose  CV:  No pain, palpitations  Pulm:  No dyspnea, cough  GI:  See HPI, otherwise negative  :  No  incontinence, nocturia  Muscle:  No pain, weakness  Neuro:  No memory problems  Psych:  No insomnia, mood problems, depression  Endocrine:  No polyuria, polydipsia, cold/heat intolerance  Heme:  No petechiae, ecchymosis, easy bruisability  Skin:  No rash    PMHX/PSHX:    HTN (hypertension)    Chronic kidney disease (CKD)    H/O chronic pancreatitis    Gout    Alcohol abuse      Allergies:  No Known Allergies      Home Medications: reviewed  Hospital Medications:  acetaminophen     Tablet .. 650 milliGRAM(s) Oral every 6 hours PRN  aluminum hydroxide/magnesium hydroxide/simethicone Suspension 30 milliLiter(s) Oral every 4 hours PRN  melatonin 3 milliGRAM(s) Oral at bedtime PRN  ondansetron Injectable 4 milliGRAM(s) IV Push every 8 hours PRN  pancrelipase  (CREON  6,000 Lipase Units) 2 Capsule(s) Oral three times a day with meals  pantoprazole    Tablet 40 milliGRAM(s) Oral two times a day      Social History:   Tobacco: denies  Alcohol: denies  Recreational drugs: denies    Family history:      Denies family history of colon cancer/polyps, stomach cancer/polyps, pancreatic cancer/masses, liver cancer/disease, ovarian cancer and endometrial cancer.    PHYSICAL EXAM:   Vital Signs:  Vital Signs Last 24 Hrs  T(C): 36.6 (07 May 2022 13:00), Max: 36.8 (07 May 2022 09:49)  T(F): 97.8 (07 May 2022 13:00), Max: 98.3 (07 May 2022 09:49)  HR: 69 (07 May 2022 13:00) (68 - 70)  BP: 134/67 (07 May 2022 13:00) (118/77 - 134/67)  BP(mean): --  RR: 17 (07 May 2022 13:00) (17 - 19)  SpO2: 96% (07 May 2022 13:00) (96% - 100%)  Daily     Daily     GENERAL: no acute distress  NEURO: alert  HEENT: NCAT, no conjunctival pallor appreciated  CHEST: no respiratory distress, no accessory muscle use  CARDIAC: regular rate, +S1/S2  ABDOMEN: soft, nondistended, epigastric tenderness, no rebound or guarding  EXTREMITIES: warm, well perfused  SKIN: no lesions noted    LABS: reviewed                        10.4   8.86  )-----------( 315      ( 07 May 2022 11:31 )             32.6     05-07    138  |  104  |  33<H>  ----------------------------<  91  4.0   |  21<L>  |  2.56<H>    Ca    8.2<L>      07 May 2022 11:31    TPro  5.4<L>  /  Alb  2.8<L>  /  TBili  <0.2  /  DBili  x   /  AST  17  /  ALT  10  /  AlkPhos  82  05-07    LIVER FUNCTIONS - ( 07 May 2022 11:31 )  Alb: 2.8 g/dL / Pro: 5.4 g/dL / ALK PHOS: 82 U/L / ALT: 10 U/L / AST: 17 U/L / GGT: x               Diagnostic Studies: see sunrise for full report

## 2022-05-07 NOTE — H&P ADULT - HISTORY OF PRESENT ILLNESS
61M with pmh alcohol abuse (sober x1 year), chronic pancreatitis, chronic esophagitis with stricture (s/p stent 4/19/22), CKD4 w/ recent admission for esophageal stricture s/p stent placement (4/19), now coming in as a transfer from  for worsening upper abdominal pain.      In the ED vital signs were otherwise stable   61M with pmh alcohol abuse (sober x1 year), chronic pancreatitis, chronic esophagitis with stricture (s/p stent 4/19/22), CKD4 w/ recent admission for esophageal stricture s/p stent placement (4/19), now coming in as a transfer from  for worsening upper abdominal pain.      In the ED vital signs were otherwise stable. Labs w/ lipase 32, no leukocytosis, or significant electrolyte abnormalities. CT was significant for stable pancreatic duct dilation w/ possible 1.6 stone in main pancreatic duct. GI was called to evaluate, patient admitted for further management.    61M with pmh alcohol abuse (sober x1 year), chronic pancreatitis, chronic esophagitis with stricture (s/p stent 4/19/22), CKD4 w/ recent admission for esophageal stricture s/p stent placement (4/19), now coming in as a transfer from  for worsening upper abdominal pain. The patient reports that ever since he was discharged he has been experiencing worsening abdominal pain primarily in his upper abdomen w/ radiation to his back. He denies any nausea or emesis, hematemesis associated w/ this. Additionally the patient denies any hx of fevers, chills, shortness of breath, chest pain. While the patient has been having pain, he denies any decreased appetite stating he is able to eat regular food without difficulty.   The patient was recently admitted to [facility] for hematemesis, underwent EGD which showed esophageal stricture s/p CRE balloon dilation, migration of esophageal stent s/p stent relocation over stricture with suturing to keep stent in place. Patient was discharged on protonix BID.     In the ED vital signs were otherwise stable. Labs w/ lipase 32, no leukocytosis, or significant electrolyte abnormalities. CT was significant for stable pancreatic duct dilation w/ possible 1.6 stone in main pancreatic duct. GI was called to evaluate, patient admitted for further management.

## 2022-05-07 NOTE — PATIENT PROFILE ADULT - FALL HARM RISK - HARM RISK INTERVENTIONS

## 2022-05-07 NOTE — ED PROVIDER NOTE - OBJECTIVE STATEMENT
61 year-old male, history of HTN, CKD, ETOH abuse (sober x 1year), chronic pancreatitis, esophageal stricture with stent (04/19/22), Hep C, gout, transferred from [facility] for GI evaluation. Patient reports chronic upper abdominal pain x 4 years that got worse in the last few days. Pain is sharp, radiating to midsternal area and to back. Similar to his pain in the past. Denies any fever, chills, night sweats, recent illness, N/V/D or any other complaints. GI Dr Dr Hinds 61 year-old male, history of HTN, CKD, ETOH abuse (sober x 1year), chronic pancreatitis, esophageal stricture with stent (04/19/22), Hep C, gout, transferred from [facility] for GI evaluation. Patient reports chronic upper abdominal pain x 4 years that got worse in the last few days. Pain is sharp, radiating to midsternal area and to back. Similar to his pain in the past. Denies any fever, chills, night sweats, recent illness, N/V/D or any other complaints. GI Dr Dr Hinds. 61 year-old male, history of HTN, CKD, ETOH abuse (sober x 1year), chronic pancreatitis, esophageal stricture with stent (04/19/22), Hep C, gout, transferred from [facility] for GI evaluation. Patient reports chronic upper abdominal pain x 4 years that got worse in the last few days. Pain is sharp, radiating to midsternal area and to back. Similar to his pain in the past. Denies any fever, chills, night sweats, recent illness, N/V/D or any other complaints. GI Dr Dr Hinds.    Other chart MRN# [id number]

## 2022-05-08 ENCOUNTER — TRANSCRIPTION ENCOUNTER (OUTPATIENT)
Age: 62
End: 2022-05-08

## 2022-05-08 LAB
ALBUMIN SERPL ELPH-MCNC: 2.8 G/DL — LOW (ref 3.3–5)
ALP SERPL-CCNC: 76 U/L — SIGNIFICANT CHANGE UP (ref 40–120)
ALT FLD-CCNC: 14 U/L — SIGNIFICANT CHANGE UP (ref 4–41)
ANION GAP SERPL CALC-SCNC: 9 MMOL/L — SIGNIFICANT CHANGE UP (ref 7–14)
AST SERPL-CCNC: 14 U/L — SIGNIFICANT CHANGE UP (ref 4–40)
BASOPHILS # BLD AUTO: 0.09 K/UL — SIGNIFICANT CHANGE UP (ref 0–0.2)
BASOPHILS NFR BLD AUTO: 0.8 % — SIGNIFICANT CHANGE UP (ref 0–2)
BILIRUB SERPL-MCNC: <0.2 MG/DL — SIGNIFICANT CHANGE UP (ref 0.2–1.2)
BUN SERPL-MCNC: 41 MG/DL — HIGH (ref 7–23)
CALCIUM SERPL-MCNC: 8.1 MG/DL — LOW (ref 8.4–10.5)
CHLORIDE SERPL-SCNC: 103 MMOL/L — SIGNIFICANT CHANGE UP (ref 98–107)
CO2 SERPL-SCNC: 26 MMOL/L — SIGNIFICANT CHANGE UP (ref 22–31)
CREAT SERPL-MCNC: 2.69 MG/DL — HIGH (ref 0.5–1.3)
EGFR: 26 ML/MIN/1.73M2 — LOW
EOSINOPHIL # BLD AUTO: 0.31 K/UL — SIGNIFICANT CHANGE UP (ref 0–0.5)
EOSINOPHIL NFR BLD AUTO: 2.7 % — SIGNIFICANT CHANGE UP (ref 0–6)
GLUCOSE SERPL-MCNC: 118 MG/DL — HIGH (ref 70–99)
HCT VFR BLD CALC: 29.8 % — LOW (ref 39–50)
HGB BLD-MCNC: 9.8 G/DL — LOW (ref 13–17)
IANC: 8.09 K/UL — HIGH (ref 1.8–7.4)
IMM GRANULOCYTES NFR BLD AUTO: 0.6 % — SIGNIFICANT CHANGE UP (ref 0–1.5)
LYMPHOCYTES # BLD AUTO: 19 % — SIGNIFICANT CHANGE UP (ref 13–44)
LYMPHOCYTES # BLD AUTO: 2.2 K/UL — SIGNIFICANT CHANGE UP (ref 1–3.3)
MAGNESIUM SERPL-MCNC: 1.8 MG/DL — SIGNIFICANT CHANGE UP (ref 1.6–2.6)
MCHC RBC-ENTMCNC: 29.9 PG — SIGNIFICANT CHANGE UP (ref 27–34)
MCHC RBC-ENTMCNC: 32.9 GM/DL — SIGNIFICANT CHANGE UP (ref 32–36)
MCV RBC AUTO: 90.9 FL — SIGNIFICANT CHANGE UP (ref 80–100)
MONOCYTES # BLD AUTO: 0.83 K/UL — SIGNIFICANT CHANGE UP (ref 0–0.9)
MONOCYTES NFR BLD AUTO: 7.2 % — SIGNIFICANT CHANGE UP (ref 2–14)
NEUTROPHILS # BLD AUTO: 8.09 K/UL — HIGH (ref 1.8–7.4)
NEUTROPHILS NFR BLD AUTO: 69.7 % — SIGNIFICANT CHANGE UP (ref 43–77)
NRBC # BLD: 0 /100 WBCS — SIGNIFICANT CHANGE UP
NRBC # FLD: 0 K/UL — SIGNIFICANT CHANGE UP
PHOSPHATE SERPL-MCNC: 2.2 MG/DL — LOW (ref 2.5–4.5)
PLATELET # BLD AUTO: 303 K/UL — SIGNIFICANT CHANGE UP (ref 150–400)
POTASSIUM SERPL-MCNC: 4.1 MMOL/L — SIGNIFICANT CHANGE UP (ref 3.5–5.3)
POTASSIUM SERPL-SCNC: 4.1 MMOL/L — SIGNIFICANT CHANGE UP (ref 3.5–5.3)
PROT SERPL-MCNC: 5.4 G/DL — LOW (ref 6–8.3)
RBC # BLD: 3.28 M/UL — LOW (ref 4.2–5.8)
RBC # FLD: 17.4 % — HIGH (ref 10.3–14.5)
SODIUM SERPL-SCNC: 138 MMOL/L — SIGNIFICANT CHANGE UP (ref 135–145)
WBC # BLD: 11.59 K/UL — HIGH (ref 3.8–10.5)
WBC # FLD AUTO: 11.59 K/UL — HIGH (ref 3.8–10.5)

## 2022-05-08 PROCEDURE — 99232 SBSQ HOSP IP/OBS MODERATE 35: CPT | Mod: GC

## 2022-05-08 RX ORDER — SODIUM,POTASSIUM PHOSPHATES 278-250MG
1 POWDER IN PACKET (EA) ORAL EVERY 8 HOURS
Refills: 0 | Status: COMPLETED | OUTPATIENT
Start: 2022-05-08 | End: 2022-05-08

## 2022-05-08 RX ORDER — SODIUM CHLORIDE 9 MG/ML
1000 INJECTION, SOLUTION INTRAVENOUS
Refills: 0 | Status: DISCONTINUED | OUTPATIENT
Start: 2022-05-08 | End: 2022-05-09

## 2022-05-08 RX ORDER — CHLORPROMAZINE HCL 10 MG
10 TABLET ORAL THREE TIMES A DAY
Refills: 0 | Status: DISCONTINUED | OUTPATIENT
Start: 2022-05-08 | End: 2022-05-10

## 2022-05-08 RX ORDER — OXYCODONE HYDROCHLORIDE 5 MG/1
10 TABLET ORAL EVERY 4 HOURS
Refills: 0 | Status: DISCONTINUED | OUTPATIENT
Start: 2022-05-08 | End: 2022-05-09

## 2022-05-08 RX ADMIN — OXYCODONE HYDROCHLORIDE 10 MILLIGRAM(S): 5 TABLET ORAL at 20:45

## 2022-05-08 RX ADMIN — OXYCODONE HYDROCHLORIDE 10 MILLIGRAM(S): 5 TABLET ORAL at 07:54

## 2022-05-08 RX ADMIN — OXYCODONE HYDROCHLORIDE 10 MILLIGRAM(S): 5 TABLET ORAL at 17:25

## 2022-05-08 RX ADMIN — OXYCODONE HYDROCHLORIDE 10 MILLIGRAM(S): 5 TABLET ORAL at 08:44

## 2022-05-08 RX ADMIN — OXYCODONE HYDROCHLORIDE 10 MILLIGRAM(S): 5 TABLET ORAL at 21:15

## 2022-05-08 RX ADMIN — Medication 100 MILLIGRAM(S): at 12:02

## 2022-05-08 RX ADMIN — Medication 1 PACKET(S): at 14:28

## 2022-05-08 RX ADMIN — Medication 2 CAPSULE(S): at 12:03

## 2022-05-08 RX ADMIN — Medication 2 CAPSULE(S): at 17:35

## 2022-05-08 RX ADMIN — SODIUM CHLORIDE 100 MILLILITER(S): 9 INJECTION, SOLUTION INTRAVENOUS at 10:34

## 2022-05-08 RX ADMIN — Medication 2 CAPSULE(S): at 09:02

## 2022-05-08 RX ADMIN — OXYCODONE HYDROCHLORIDE 10 MILLIGRAM(S): 5 TABLET ORAL at 00:48

## 2022-05-08 RX ADMIN — OXYCODONE HYDROCHLORIDE 10 MILLIGRAM(S): 5 TABLET ORAL at 05:51

## 2022-05-08 RX ADMIN — Medication 1 MILLIGRAM(S): at 12:02

## 2022-05-08 RX ADMIN — Medication 1 PACKET(S): at 09:41

## 2022-05-08 RX ADMIN — OXYCODONE HYDROCHLORIDE 10 MILLIGRAM(S): 5 TABLET ORAL at 13:00

## 2022-05-08 RX ADMIN — HEPARIN SODIUM 5000 UNIT(S): 5000 INJECTION INTRAVENOUS; SUBCUTANEOUS at 17:35

## 2022-05-08 RX ADMIN — OXYCODONE HYDROCHLORIDE 10 MILLIGRAM(S): 5 TABLET ORAL at 12:02

## 2022-05-08 RX ADMIN — PANTOPRAZOLE SODIUM 40 MILLIGRAM(S): 20 TABLET, DELAYED RELEASE ORAL at 17:34

## 2022-05-08 RX ADMIN — PANTOPRAZOLE SODIUM 40 MILLIGRAM(S): 20 TABLET, DELAYED RELEASE ORAL at 05:40

## 2022-05-08 RX ADMIN — OXYCODONE HYDROCHLORIDE 10 MILLIGRAM(S): 5 TABLET ORAL at 16:25

## 2022-05-08 RX ADMIN — HEPARIN SODIUM 5000 UNIT(S): 5000 INJECTION INTRAVENOUS; SUBCUTANEOUS at 05:40

## 2022-05-08 RX ADMIN — OXYCODONE HYDROCHLORIDE 10 MILLIGRAM(S): 5 TABLET ORAL at 03:45

## 2022-05-08 NOTE — DISCHARGE NOTE PROVIDER - NSDCMRMEDTOKEN_GEN_ALL_CORE_FT
bisacodyl 5 mg oral delayed release tablet: 1 tab(s) orally once a day  chlorproMAZINE 10 mg oral tablet: 1 tab(s) orally 3 times a day, As Needed for hiccupping  Creon 6000 units oral delayed release capsule: 2 cap(s) orally 3 times a day  cyclobenzaprine 5 mg oral tablet: 1 tab(s) orally 3 times a day, As Needed for hiccupping  folic acid 1 mg oral tablet: 1 tab(s) orally once a day  gabapentin 250 mg/5 mL oral solution: 6 milliliter(s) orally 3 times a day  MiraLax oral powder for reconstitution: 17 gram(s) orally 2 times a day  oxyCODONE 10 mg oral tablet: 1 tab(s) orally every 6 hours, As Needed for severe pain  pantoprazole 40 mg oral delayed release tablet: 1 tab(s) orally 2 times a day  sucralfate 1 g oral tablet: 1 tab(s) orally 4 times a day (before meals and at bedtime)  thiamine 100 mg oral tablet: 1 tab(s) orally once a day   bisacodyl 5 mg oral delayed release tablet: 1 tab(s) orally once a day  chlorproMAZINE 10 mg oral tablet: 1 tab(s) orally 3 times a day, As Needed for hiccupping  Creon 6000 units oral delayed release capsule: 2 cap(s) orally 3 times a day  cyclobenzaprine 5 mg oral tablet: 1 tab(s) orally 3 times a day, As Needed for hiccupping  folic acid 1 mg oral tablet: 1 tab(s) orally once a day  MiraLax oral powder for reconstitution: 17 gram(s) orally 2 times a day  oxyCODONE 10 mg oral tablet: 1 tab(s) orally every 6 hours, As Needed for severe pain  pantoprazole 40 mg oral delayed release tablet: 1 tab(s) orally 2 times a day  thiamine 100 mg oral tablet: 1 tab(s) orally once a day   bisacodyl 5 mg oral delayed release tablet: 1 tab(s) orally once a day (at bedtime)  bisacodyl 5 mg oral delayed release tablet: 1 tab(s) orally once a day  chlorproMAZINE 10 mg oral tablet: 1 tab(s) orally 3 times a day, As Needed for hiccupping  chlorproMAZINE 10 mg oral tablet: 1 tab(s) orally every 8 hours, As needed, hiccuping MDD:30mg  Creon 6000 units oral delayed release capsule: 2 cap(s) orally 3 times a day  cyclobenzaprine 5 mg oral tablet: 1 tab(s) orally 3 times a day, As Needed for hiccupping  cyclobenzaprine 5 mg oral tablet: 1 tab(s) orally 3 times a day, As needed, hiccuping MDD:15mg  folic acid 1 mg oral tablet: 1 tab(s) orally once a day  folic acid 1 mg oral tablet: 1 tab(s) orally once a day  gabapentin 250 mg/5 mL oral solution: 4 milliliter(s) orally once a day (at bedtime) MDD:200mg  HYDROmorphone: 0.5 milligram(s) intravenous every 4 hours (5 times/day)  lactulose 10 g/15 mL oral syrup: 30 milliliter(s) orally once a day as needed for constipation  MiraLax oral powder for reconstitution: 17 gram(s) orally 2 times a day  oxyCODONE 10 mg oral tablet: 1 tab(s) orally every 6 hours, As Needed  for severe pain MDD:4 tablets maximum in a day (40 mg)  pancrelipase 6000 units-19,000 units-30,000 units oral delayed release capsule: 2 cap(s) orally 3 times a day (with meals)  pantoprazole 40 mg oral delayed release tablet: 1 tab(s) orally every 12 hours  pantoprazole 40 mg oral delayed release tablet: 1 tab(s) orally 2 times a day  polyethylene glycol 3350 oral powder for reconstitution: 17 gram(s) orally 2 times a day  Rolling Walker:   senna oral tablet: 2 tab(s) orally once a day (at bedtime)  sucralfate 1 g oral tablet: 1 tab(s) orally every 6 hours   tamsulosin 0.4 mg oral capsule: 1 cap(s) orally once a day (at bedtime)  thiamine 100 mg oral tablet: 1 tab(s) orally once a day  thiamine 100 mg oral tablet: 1 tab(s) orally once a day

## 2022-05-08 NOTE — DISCHARGE NOTE PROVIDER - DETAILS OF MALNUTRITION DIAGNOSIS/DIAGNOSES
This patient has been assessed with a concern for Malnutrition and was treated during this hospitalization for the following Nutrition diagnosis/diagnoses:     -  05/09/2022: Severe protein-calorie malnutrition   -  05/09/2022: Underweight (BMI < 19)

## 2022-05-08 NOTE — PHYSICAL THERAPY INITIAL EVALUATION ADULT - PERTINENT HX OF CURRENT PROBLEM, REHAB EVAL
Patient is 61 year old male with pmhx alcohol abuse (sober x1 year), chronic pancreatitis, chronic esophagitis with stricture (s/p stent 4/19/22), CKD4 w/ recent admission for esophageal stricture s/p stent placement (4/19), now coming in as a transfer from  for worsening upper abdominal pain.

## 2022-05-08 NOTE — DISCHARGE NOTE PROVIDER - CARE PROVIDER_API CALL
Lamberto Hurtado)  Internal Medicine  [address] [place] [address]  Phone: [phone]  Fax: [phone]  Established Patient  Follow Up Time: 1 week    Eron Hinds)  Gastroenterology; Internal Medicine  [address] [place] [address]  Phone: [phone]  Fax: [phone]  Established Patient  Follow Up Time: 2 weeks   Lamberto Hurtado)  Internal Medicine  [address] [place] [address]  Phone: [phone]  Fax: [phone]  Established Patient  Follow Up Time: 1 week    Eron Hinds)  Gastroenterology; Internal Medicine  [address] [place] [address], [address]  [place] [address]  Phone: [phone]  Fax: [phone]  Established Patient  Follow Up Time: 2 weeks    Gene Fraga)  Nephrology  [address] [place] [address], [address] [place] [address]  Phone: [phone]  Fax: [phone]  Follow Up Time:

## 2022-05-08 NOTE — DISCHARGE NOTE PROVIDER - NSDCCPCAREPLAN_GEN_ALL_CORE_FT
PRINCIPAL DISCHARGE DIAGNOSIS  Diagnosis: Abdominal pain  Assessment and Plan of Treatment: You were admited for worsening abodminal pain. Initial CT scans demonstrated a possible stone in your pancreatic duct. We obtain further imaging with an MRCP which showed ____.       PRINCIPAL DISCHARGE DIAGNOSIS  Diagnosis: Abdominal pain  Assessment and Plan of Treatment: You were admited for worsening abodminal pain. Initial CT scans demonstrated a possible stone in your pancreatic duct. You were originally planned for MRCP but did not want one due to anxiety and GI/hepatology said you did not need a MRCP given a relatively recent MR showing chronic pancreatitis changes. You were stable for discharge.

## 2022-05-08 NOTE — DISCHARGE NOTE PROVIDER - PROVIDER TOKENS
PROVIDER:[TOKEN:[[id number]],FOLLOWUP:[1 week],ESTABLISHEDPATIENT:[T]],PROVIDER:[TOKEN:[[id number]],FOLLOWUP:[2 weeks],ESTABLISHEDPATIENT:[T]] PROVIDER:[TOKEN:[[id number]],FOLLOWUP:[1 week],ESTABLISHEDPATIENT:[T]],PROVIDER:[TOKEN:[[id number]],FOLLOWUP:[2 weeks],ESTABLISHEDPATIENT:[T]],PROVIDER:[TOKEN:[[id number]]]

## 2022-05-08 NOTE — PHYSICAL THERAPY INITIAL EVALUATION ADULT - PATIENT/FAMILY AGREES WITH PLAN
Mr. Forman is a 89-year-old male [employer]  with HTN who presented to [facility] with SOB and worsening LE edema. His initial symptoms were present for several weeks prior to presentation and include orthopnea, some cough, and progressively worsening lower extremity edema and was admitted with acute diastolic heart failure.    Upon admission he reported his SOB started 2-3 weeks ago, and acutely worsened over the last few days prompting him to present to the ED. Family and patient report orthopnea, but patient insists of sleeping flat. He reported some cough and wheeze, but no fever or recent sick contacts. He does not use oxygen at home and lives with his daughter. He reported no chest pain and had no syncope.  He does report some dizziness when he moves around too much. No recent sick contacts. He denies every being told that he has issues with HF. He lost his wife of 74 years in May. 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 He denies history of COPD.       Mr. Forman was admitted to hospital medicine on 11/17 for acute diastolic heart failure, for which he underwent diuresis with IV lasix, later transitioned to oral lasix on 11/18. Due to concern for new infiltrate on CXR and productive cough, antibiotics were started for presumptive PNA. Lab infectious workup including procal, LA and WBC were normal, but he felt subjectively improved so the decision was made to complete a five day course (Levaquin). PT/OT consulted and recommended SNF, for which he was transferred here for continued PT/OT.  
yes

## 2022-05-08 NOTE — PROGRESS NOTE ADULT - SUBJECTIVE AND OBJECTIVE BOX
Michele Oakley  Internal Medicine, PGY-1  Please Contact via TEAMS    SUBJECTIVE / OVERNIGHT EVENTS:  - Pt seen and examined at bedside  - KRANTHI  - Patient reports interval improvement in abdominal pain w/ home pain regimen. Tolerating PO regimen well.     MEDICATIONS  (STANDING):  folic acid 1 milliGRAM(s) Oral daily  heparin   Injectable 5000 Unit(s) SubCutaneous every 12 hours  pancrelipase  (CREON  6,000 Lipase Units) 2 Capsule(s) Oral three times a day with meals  pantoprazole    Tablet 40 milliGRAM(s) Oral two times a day  polyethylene glycol 3350 17 Gram(s) Oral two times a day  senna 2 Tablet(s) Oral at bedtime  thiamine 100 milliGRAM(s) Oral daily    MEDICATIONS  (PRN):  acetaminophen     Tablet .. 650 milliGRAM(s) Oral every 6 hours PRN Temp greater or equal to 38C (100.4F), Mild Pain (1 - 3)  aluminum hydroxide/magnesium hydroxide/simethicone Suspension 30 milliLiter(s) Oral every 4 hours PRN Dyspepsia  LORazepam   Injectable 0.5 milliGRAM(s) IV Push once PRN before MRCP  melatonin 3 milliGRAM(s) Oral at bedtime PRN Insomnia  ondansetron Injectable 4 milliGRAM(s) IV Push every 8 hours PRN Nausea and/or Vomiting  oxyCODONE    IR 10 milliGRAM(s) Oral every 4 hours PRN Severe Pain (7 - 10)          PHYSICAL EXAM:  Vital Signs Last 24 Hrs  T(C): 36.9 (08 May 2022 05:35), Max: 37.2 (07 May 2022 18:05)  T(F): 98.4 (08 May 2022 05:35), Max: 98.9 (07 May 2022 18:05)  HR: 78 (08 May 2022 05:35) (62 - 79)  BP: 152/74 (08 May 2022 05:35) (118/77 - 152/74)  BP(mean): --  RR: 18 (08 May 2022 05:35) (17 - 19)  SpO2: 96% (08 May 2022 05:35) (96% - 100%)    CAPILLARY BLOOD GLUCOSE        I&O's Summary      CONSTITUTIONAL: NAD, thin  RESPIRATORY: Normal respiratory effort; lungs are clear to auscultation bilaterally  CARDIOVASCULAR: Regular rate and rhythm, normal S1 and S2, no murmur/rub/gallop; No lower extremity edema; Peripheral pulses are 2+ bilaterally  ABDOMEN: Nontender to palpation, normoactive bowel sounds, no rebound/guarding; No hepatosplenomegaly  MUSCLOSKELETAL: no clubbing or cyanosis of digits; no joint swelling or tenderness to palpation  PSYCH: A+O to person, place, and time; affect appropriate    LABS:                        10.4   8.86  )-----------( 315      ( 07 May 2022 11:31 )             32.6     05-07    138  |  104  |  33<H>  ----------------------------<  91  4.0   |  21<L>  |  2.56<H>    Ca    8.2<L>      07 May 2022 11:31    TPro  5.4<L>  /  Alb  2.8<L>  /  TBili  <0.2  /  DBili  x   /  AST  17  /  ALT  10  /  AlkPhos  82  05-07                IMAGING:    [X] All pertinent imaging reviewed by me

## 2022-05-08 NOTE — PROGRESS NOTE ADULT - SUBJECTIVE AND OBJECTIVE BOX
Interval Events:   No acute events overnight.  Patient still endorsing epigastric pain.    ROS:   12 point review of systems performed and negative except otherwise noted in HPI.    Hospital Medications:  acetaminophen     Tablet .. 650 milliGRAM(s) Oral every 6 hours PRN  aluminum hydroxide/magnesium hydroxide/simethicone Suspension 30 milliLiter(s) Oral every 4 hours PRN  chlorproMAZINE    Tablet 10 milliGRAM(s) Oral three times a day PRN  folic acid 1 milliGRAM(s) Oral daily  heparin   Injectable 5000 Unit(s) SubCutaneous every 12 hours  lactated ringers. 1000 milliLiter(s) IV Continuous <Continuous>  LORazepam   Injectable 0.5 milliGRAM(s) IV Push once PRN  melatonin 3 milliGRAM(s) Oral at bedtime PRN  ondansetron Injectable 4 milliGRAM(s) IV Push every 8 hours PRN  oxyCODONE    IR 10 milliGRAM(s) Oral every 4 hours PRN  pancrelipase  (CREON  6,000 Lipase Units) 2 Capsule(s) Oral three times a day with meals  pantoprazole    Tablet 40 milliGRAM(s) Oral two times a day  polyethylene glycol 3350 17 Gram(s) Oral two times a day  potassium phosphate / sodium phosphate Powder (PHOS-NaK) 1 Packet(s) Oral every 8 hours  senna 2 Tablet(s) Oral at bedtime  thiamine 100 milliGRAM(s) Oral daily      PHYSICAL EXAM:   Vital Signs:  Vital Signs Last 24 Hrs  T(C): 36.9 (08 May 2022 05:35), Max: 37.2 (07 May 2022 18:05)  T(F): 98.4 (08 May 2022 05:35), Max: 98.9 (07 May 2022 18:05)  HR: 78 (08 May 2022 05:35) (62 - 79)  BP: 152/74 (08 May 2022 05:35) (134/67 - 152/74)  BP(mean): --  RR: 18 (08 May 2022 05:35) (17 - 19)  SpO2: 96% (08 May 2022 05:35) (96% - 100%)  Daily     Daily     GENERAL: no acute distress  NEURO: alert  HEENT: NCAT, no conjunctival pallor appreciated  CHEST: no respiratory distress, no accessory muscle use  CARDIAC: regular rate, +S1/S2  ABDOMEN: soft, nondistended, epigastric tenderness, no rebound or guarding  EXTREMITIES: warm, well perfused  SKIN: no lesions noted    LABS: reviewed                        9.8    11.59 )-----------( 303      ( 08 May 2022 08:30 )             29.8     05-08    138  |  103  |  41<H>  ----------------------------<  118<H>  4.1   |  26  |  2.69<H>    Ca    8.1<L>      08 May 2022 08:30  Phos  2.2     05-08  Mg     1.80     05-08    TPro  5.4<L>  /  Alb  2.8<L>  /  TBili  <0.2  /  DBili  x   /  AST  14  /  ALT  14  /  AlkPhos  76  05-08    LIVER FUNCTIONS - ( 08 May 2022 08:30 )  Alb: 2.8 g/dL / Pro: 5.4 g/dL / ALK PHOS: 76 U/L / ALT: 14 U/L / AST: 14 U/L / GGT: x             Interval Diagnostic Studies: see sunrise for full report

## 2022-05-08 NOTE — PROGRESS NOTE ADULT - ASSESSMENT
61 year old male with history of chronic pancreatitis, HCV s/p SVR, and esophageal stricture s/p stent placement 4/19/2022 c/b need for reposition & suture 4/29/2022 who presents with abdominal pain.    # Acute on chronic pancreatitis  # Chronic PD dilatation  # Concern for 1.6cm PD stone on imaging  # Esophageal stricture s/p stent placement 4/19/2022 c/b need for reposition & suture 4/29/2022  # HCV s/p SVR  History of esophageal stricture requiring stent placement on 4/19/2022.  Post-procedural course c/b hematemesis with stent malposition requiring EGD for stent repositioning and suture on 4/29/2022 with sloughing of esophageal mucosa noted.  After discharge, patient subsequently presents to [facility] with epigastric pain spreading "all over his body," including his back.  Patient subsequently transferred to [facility] ED after CT A/P reveals chronic pancreatitis with significant calcifications, stable PD dilation, which "may be due to 1.6cm PD stone."      Recommendations:  -trend clinical symptoms, exam findings, vital signs, CBC, CMP  -given questionable presence of PD stone, f/u MRCP to further characterize ducts and/or stone disease  -IVF resuscitation and supportive care for acute on chronic pancreatitis  -no issues with recent stent placement and reposition given lack of dysphagia or evidence of overt GI bleeding    Recommendations incomplete until finalized by attending signature/attestation to note.    Morales Meier, PGY-4  GI/Hepatology Fellow    MONDAY-FRIDAY 8AM-5PM:  Pager# [phone] ([facility]) or [phone] ([facility])    NON-URGENT CONSULTS:  Please email [email or web address] OR [email or web address]  AT NIGHT AND ON WEEKENDS:  Contact on-call GI fellow via answering service ([phone]) from 5pm-8am and on weekends/holidays

## 2022-05-08 NOTE — DISCHARGE NOTE PROVIDER - HOSPITAL COURSE
HPI:     61M with pmh alcohol abuse (sober x1 year), chronic pancreatitis, chronic esophagitis with stricture (s/p stent 4/19/22), CKD4 w/ recent admission for esophageal stricture s/p stent placement (4/19), now coming in as a transfer from  for worsening upper abdominal pain. The patient reports that ever since he was discharged he has been experiencing worsening abdominal pain primarily in his upper abdomen w/ radiation to his back. He denies any nausea or emesis, hematemesis associated w/ this. Additionally the patient denies any hx of fevers, chills, shortness of breath, chest pain. While the patient has been having pain, he denies any decreased appetite stating he is able to eat regular food without difficulty.   The patient was recently admitted to [facility] for hematemesis, underwent EGD which showed esophageal stricture s/p CRE balloon dilation, migration of esophageal stent s/p stent relocation over stricture with suturing to keep stent in place. Patient was discharged on protonix BID.     In the ED vital signs were otherwise stable. Labs w/ lipase 32, no leukocytosis, or significant electrolyte abnormalities. CT was significant for stable pancreatic duct dilation w/ possible 1.6 stone in main pancreatic duct. GI was called to evaluate, patient admitted for further management.       Hospital Course:    While admitted patient had good appetite able to tolerate PO intake, on regular diet on admission. Patient's pain was controlled on home medication of oxycodone 10 mg Q4 PRN. GI evaluated the patient for the possible pancreatic duct stone, recommended MRCP for further evaluation. Patient w/o any features concerning for malposition of esophageal stent. MRCP was significant for ___________. HPI:     61M with pmh alcohol abuse (sober x1 year), chronic pancreatitis, chronic esophagitis with stricture (s/p stent 4/19/22), CKD4 w/ recent admission for esophageal stricture s/p stent placement (4/19), now coming in as a transfer from  for worsening upper abdominal pain. The patient reports that ever since he was discharged he has been experiencing worsening abdominal pain primarily in his upper abdomen w/ radiation to his back. He denies any nausea or emesis, hematemesis associated w/ this. Additionally the patient denies any hx of fevers, chills, shortness of breath, chest pain. While the patient has been having pain, he denies any decreased appetite stating he is able to eat regular food without difficulty.   The patient was recently admitted to [facility] for hematemesis, underwent EGD which showed esophageal stricture s/p CRE balloon dilation, migration of esophageal stent s/p stent relocation over stricture with suturing to keep stent in place. Patient was discharged on protonix BID.     In the ED vital signs were otherwise stable. Labs w/ lipase 32, no leukocytosis, or significant electrolyte abnormalities. CT was significant for stable pancreatic duct dilation w/ possible 1.6 stone in main pancreatic duct. GI was called to evaluate, patient admitted for further management.       Hospital Course:    While admitted patient had good appetite able to tolerate PO intake, on regular diet on admission. Patient's pain was controlled on home medication of oxycodone 10 mg Q4 PRN. GI evaluated the patient for the possible pancreatic duct stone, recommended MRCP for further evaluation. Patient was originally planned for MRCP but per patient request did not want to receive MRCP. Based on previous imaging (MR abdomen) GI said MRCP was not necessary and patient pain self resolved. Patient w/o any features concerning for malposition of esophageal stent.

## 2022-05-08 NOTE — DISCHARGE NOTE PROVIDER - CARE PROVIDERS DIRECT ADDRESSES
,DirectAddress_Unknown,[email or web address] ,DirectAddress_Unknown,[email or web address]

## 2022-05-08 NOTE — PROGRESS NOTE ADULT - ASSESSMENT
61M with pmh alcohol abuse (sober x1 year), chronic pancreatitis, chronic esophagitis with stricture (s/p stent 4/19/22), CKD4 w/ recent admission for esophageal stricture s/p stent placement (4/19), now coming in as a transfer from  for worsening upper abdominal pain, found to have a possible pancreatic duct stone on CT, pending MRCP.     FYI: patient has other chart MRN# [id number]

## 2022-05-08 NOTE — DISCHARGE NOTE PROVIDER - NSDCFUSCHEDAPPT_GEN_ALL_CORE_FT
Lamberto Hurtado  [facility]  INTMED OP [id number]  Scheduled Appointment: 06/15/2022    [facility]  Gastro OP [id number]  Scheduled Appointment: 06/23/2022    Cirilo Edwards  [facility]  [facility] Amb Surg Endoscopy  Scheduled Appointment: 06/29/2022    Gene Fraga  [facility]  NEPHRO OP [id number]  Scheduled Appointment: 07/20/2022

## 2022-05-09 PROCEDURE — 99232 SBSQ HOSP IP/OBS MODERATE 35: CPT | Mod: GC

## 2022-05-09 RX ORDER — OXYCODONE HYDROCHLORIDE 5 MG/1
10 TABLET ORAL EVERY 4 HOURS
Refills: 0 | Status: DISCONTINUED | OUTPATIENT
Start: 2022-05-09 | End: 2022-05-10

## 2022-05-09 RX ADMIN — OXYCODONE HYDROCHLORIDE 10 MILLIGRAM(S): 5 TABLET ORAL at 14:30

## 2022-05-09 RX ADMIN — OXYCODONE HYDROCHLORIDE 10 MILLIGRAM(S): 5 TABLET ORAL at 09:30

## 2022-05-09 RX ADMIN — OXYCODONE HYDROCHLORIDE 10 MILLIGRAM(S): 5 TABLET ORAL at 01:16

## 2022-05-09 RX ADMIN — Medication 2 CAPSULE(S): at 18:02

## 2022-05-09 RX ADMIN — OXYCODONE HYDROCHLORIDE 10 MILLIGRAM(S): 5 TABLET ORAL at 22:44

## 2022-05-09 RX ADMIN — OXYCODONE HYDROCHLORIDE 10 MILLIGRAM(S): 5 TABLET ORAL at 13:44

## 2022-05-09 RX ADMIN — OXYCODONE HYDROCHLORIDE 10 MILLIGRAM(S): 5 TABLET ORAL at 10:30

## 2022-05-09 RX ADMIN — Medication 10 MILLIGRAM(S): at 22:24

## 2022-05-09 RX ADMIN — OXYCODONE HYDROCHLORIDE 10 MILLIGRAM(S): 5 TABLET ORAL at 18:09

## 2022-05-09 RX ADMIN — OXYCODONE HYDROCHLORIDE 10 MILLIGRAM(S): 5 TABLET ORAL at 05:04

## 2022-05-09 RX ADMIN — OXYCODONE HYDROCHLORIDE 10 MILLIGRAM(S): 5 TABLET ORAL at 05:34

## 2022-05-09 RX ADMIN — Medication 30 MILLILITER(S): at 22:17

## 2022-05-09 RX ADMIN — HEPARIN SODIUM 5000 UNIT(S): 5000 INJECTION INTRAVENOUS; SUBCUTANEOUS at 05:04

## 2022-05-09 RX ADMIN — Medication 100 MILLIGRAM(S): at 11:44

## 2022-05-09 RX ADMIN — Medication 2 CAPSULE(S): at 11:44

## 2022-05-09 RX ADMIN — SENNA PLUS 2 TABLET(S): 8.6 TABLET ORAL at 22:14

## 2022-05-09 RX ADMIN — Medication 30 MILLILITER(S): at 10:27

## 2022-05-09 RX ADMIN — HEPARIN SODIUM 5000 UNIT(S): 5000 INJECTION INTRAVENOUS; SUBCUTANEOUS at 18:02

## 2022-05-09 RX ADMIN — Medication 30 MILLILITER(S): at 18:02

## 2022-05-09 RX ADMIN — OXYCODONE HYDROCHLORIDE 10 MILLIGRAM(S): 5 TABLET ORAL at 18:45

## 2022-05-09 RX ADMIN — PANTOPRAZOLE SODIUM 40 MILLIGRAM(S): 20 TABLET, DELAYED RELEASE ORAL at 18:02

## 2022-05-09 RX ADMIN — OXYCODONE HYDROCHLORIDE 10 MILLIGRAM(S): 5 TABLET ORAL at 00:46

## 2022-05-09 RX ADMIN — Medication 10 MILLIGRAM(S): at 18:02

## 2022-05-09 RX ADMIN — PANTOPRAZOLE SODIUM 40 MILLIGRAM(S): 20 TABLET, DELAYED RELEASE ORAL at 05:04

## 2022-05-09 RX ADMIN — Medication 2 CAPSULE(S): at 09:30

## 2022-05-09 RX ADMIN — OXYCODONE HYDROCHLORIDE 10 MILLIGRAM(S): 5 TABLET ORAL at 22:14

## 2022-05-09 RX ADMIN — Medication 10 MILLIGRAM(S): at 15:01

## 2022-05-09 NOTE — PROGRESS NOTE ADULT - SUBJECTIVE AND OBJECTIVE BOX
Interval Events:   No acute events overnight.  Patient states he feels better and that his pain has "neutralized back to his chronic pain."    ROS:   12 point review of systems performed and negative except otherwise noted in HPI.    Hospital Medications:  acetaminophen     Tablet .. 650 milliGRAM(s) Oral every 6 hours PRN  aluminum hydroxide/magnesium hydroxide/simethicone Suspension 30 milliLiter(s) Oral every 4 hours PRN  chlorproMAZINE    Tablet 10 milliGRAM(s) Oral three times a day PRN  folic acid 1 milliGRAM(s) Oral daily  heparin   Injectable 5000 Unit(s) SubCutaneous every 12 hours  lactated ringers. 1000 milliLiter(s) IV Continuous <Continuous>  LORazepam   Injectable 0.5 milliGRAM(s) IV Push once PRN  melatonin 3 milliGRAM(s) Oral at bedtime PRN  ondansetron Injectable 4 milliGRAM(s) IV Push every 8 hours PRN  oxyCODONE    IR 10 milliGRAM(s) Oral every 4 hours PRN  pancrelipase  (CREON  6,000 Lipase Units) 2 Capsule(s) Oral three times a day with meals  pantoprazole    Tablet 40 milliGRAM(s) Oral two times a day  polyethylene glycol 3350 17 Gram(s) Oral two times a day  senna 2 Tablet(s) Oral at bedtime  thiamine 100 milliGRAM(s) Oral daily      PHYSICAL EXAM:   Vital Signs:  Vital Signs Last 24 Hrs  T(C): 37.3 (09 May 2022 06:19), Max: 37.3 (08 May 2022 22:30)  T(F): 99.1 (09 May 2022 06:19), Max: 99.1 (08 May 2022 22:30)  HR: 76 (09 May 2022 06:19) (69 - 86)  BP: 150/83 (09 May 2022 06:19) (125/63 - 150/83)  BP(mean): --  RR: 18 (09 May 2022 06:19) (18 - 18)  SpO2: 100% (09 May 2022 06:19) (100% - 100%)  Daily     Daily     GENERAL: no acute distress  NEURO: alert  HEENT: NCAT, no conjunctival pallor appreciated  CHEST: no respiratory distress, no accessory muscle use  CARDIAC: regular rate, +S1/S2  ABDOMEN: soft, nondistended, mild epigastric tenderness, no rebound or guarding  EXTREMITIES: warm, well perfused  SKIN: no lesions noted    LABS: reviewed                        9.8    11.59 )-----------( 303      ( 08 May 2022 08:30 )             29.8     05-08    138  |  103  |  41<H>  ----------------------------<  118<H>  4.1   |  26  |  2.69<H>    Ca    8.1<L>      08 May 2022 08:30  Phos  2.2     05-08  Mg     1.80     05-08    TPro  5.4<L>  /  Alb  2.8<L>  /  TBili  <0.2  /  DBili  x   /  AST  14  /  ALT  14  /  AlkPhos  76  05-08    LIVER FUNCTIONS - ( 08 May 2022 08:30 )  Alb: 2.8 g/dL / Pro: 5.4 g/dL / ALK PHOS: 76 U/L / ALT: 14 U/L / AST: 14 U/L / GGT: x             Interval Diagnostic Studies: see sunrise for full report

## 2022-05-09 NOTE — DIETITIAN INITIAL EVALUATION ADULT - OTHER CALCULATIONS
No dosing height or weight available at this time. Obtained history per HIE: (5/6/22) 127 lbs, (4/6/22) 126 lbs, (2/9/22) 136 lbs, (12/20/21) 143 lbs. Apparent 16 lb (11%) weight loss x<6 months, clinically significant.

## 2022-05-09 NOTE — DIETITIAN INITIAL EVALUATION ADULT - DIET TYPE
1) Recommend discontinue Low Fat restriction, change to regular diet to maximize menu item availability.

## 2022-05-09 NOTE — DIETITIAN NUTRITION RISK NOTIFICATION - ADDITIONAL COMMENTS/DIETITIAN RECOMMENDATIONS
Please see Dietitian Initial Assessment for complete recommendations.  Esperanza Das, LENARDN, CDN #[id number]

## 2022-05-09 NOTE — DIETITIAN INITIAL EVALUATION ADULT - OTHER INFO
Per chart, pt is 61 year old male PMHx EtOH abuse (sober x1 year), chronic pancreatitis, chronic esophagitis with stricture (s/p stent 4/19/22), CKD4 with recent admission for esophageal stricture s/p stent placement (4/19/22) complicated by need for reposition and suture (4/29/2022) now presenting as transfer from  for worsening upper abdominal pain found to have possible pancreatic duct stone on CT, pending MRCP results. GI on board.    NKFA. No noted chewing/swallowing difficulties. Noted with chronic abdominal pain, however able to tolerate PO intake with generally good appetite PTA. Noted with history of chronic pancreatitis, on CREON PTA. Of note, pt met criteria for severe malnutrition during previous admissions per RDN assessments (4/16/22, 1/31/22, 12/22/22).     Pt does not participate in discussion separate from handing over a piece of paper with multiple food requests, which pt asked to receive in between meals as snacks. Items requested: 2 egg salad sandwiches, 2 vanilla puddings, 2 rolls, 2 bagels, 3 peanut butters, 1 apple juice, 2 potato chips, 2 coffees, 1 tea and 12 sugar packets. Pt is familiar from recent admission (4/2022) during which time pt appeared to be hoarding food at bedside. Pt continues on Low Fat diet at this time, noted with good PO intake per flowsheet documentation. Pt is able to feed self independently. Labs notable for low P, repleted.

## 2022-05-09 NOTE — PROGRESS NOTE ADULT - ASSESSMENT
61M with pmh alcohol abuse (sober x1 year), chronic pancreatitis, chronic esophagitis with stricture (s/p stent 4/19/22), CKD4 w/ recent admission for esophageal stricture s/p stent placement (4/19), now coming in as a transfer from  for worsening upper abdominal pain, found to have a possible pancreatic duct stone on CT, pending MRCP.     FYI: patient has other chart MRN# [id number]

## 2022-05-09 NOTE — PROGRESS NOTE ADULT - ASSESSMENT
61 year old male with history of chronic pancreatitis, HCV s/p SVR, and esophageal stricture s/p stent placement 4/19/2022 c/b need for reposition & suture 4/29/2022 who presents with abdominal pain.    # Acute on chronic pancreatitis  # Chronic PD dilatation  # Concern for 1.6cm PD stone on imaging  # Esophageal stricture s/p stent placement 4/19/2022 c/b need for reposition & suture 4/29/2022  # HCV s/p SVR  History of esophageal stricture requiring stent placement on 4/19/2022.  Post-procedural course c/b hematemesis with stent malposition requiring EGD for stent repositioning and suture on 4/29/2022 with sloughing of esophageal mucosa noted.  After discharge, patient subsequently presents to [facility] with epigastric pain spreading "all over his body," including his back.  Patient subsequently transferred to [facility] ED after CT A/P reveals chronic pancreatitis with significant calcifications, stable PD dilation, which "may be due to 1.6cm PD stone."      Recommendations:  -trend clinical symptoms, exam findings, vital signs, CBC, CMP  -given questionable presence of PD stone, f/u MRCP to further characterize ducts and/or stone disease  -IVF resuscitation and supportive care for acute on chronic pancreatitis  -no issues with recent stent placement and reposition given lack of dysphagia or evidence of overt GI bleeding    Recommendations incomplete until finalized by attending signature/attestation to note.    Morales Meier, PGY-4  GI/Hepatology Fellow    MONDAY-FRIDAY 8AM-5PM:  Pager# [phone] ([facility]) or [phone] ([facility])    NON-URGENT CONSULTS:  Please email [email or web address]  AT NIGHT AND ON WEEKENDS:  Contact on-call GI fellow via answering service ([phone]) from 5pm-8am and on weekends/holidays

## 2022-05-09 NOTE — PROGRESS NOTE ADULT - SUBJECTIVE AND OBJECTIVE BOX
Internal Medicine   Julia Gardiner | PGY-1     OVERNIGHT EVENTS:      SUBJECTIVE:       MEDICATIONS  (STANDING):  folic acid 1 milliGRAM(s) Oral daily  heparin   Injectable 5000 Unit(s) SubCutaneous every 12 hours  lactated ringers. 1000 milliLiter(s) (100 mL/Hr) IV Continuous <Continuous>  pancrelipase  (CREON  6,000 Lipase Units) 2 Capsule(s) Oral three times a day with meals  pantoprazole    Tablet 40 milliGRAM(s) Oral two times a day  polyethylene glycol 3350 17 Gram(s) Oral two times a day  senna 2 Tablet(s) Oral at bedtime  thiamine 100 milliGRAM(s) Oral daily    MEDICATIONS  (PRN):  acetaminophen     Tablet .. 650 milliGRAM(s) Oral every 6 hours PRN Temp greater or equal to 38C (100.4F), Mild Pain (1 - 3)  aluminum hydroxide/magnesium hydroxide/simethicone Suspension 30 milliLiter(s) Oral every 4 hours PRN Dyspepsia  chlorproMAZINE    Tablet 10 milliGRAM(s) Oral three times a day PRN hiccuping and anxiety  LORazepam   Injectable 0.5 milliGRAM(s) IV Push once PRN before MRCP  melatonin 3 milliGRAM(s) Oral at bedtime PRN Insomnia  ondansetron Injectable 4 milliGRAM(s) IV Push every 8 hours PRN Nausea and/or Vomiting  oxyCODONE    IR 10 milliGRAM(s) Oral every 4 hours PRN Severe Pain (7 - 10)        T(F): 99.1 (05-08-22 @ 22:30), Max: 99.1 (05-08-22 @ 22:30)  HR: 76 (05-08-22 @ 22:30) (69 - 86)  BP: 125/63 (05-08-22 @ 22:30) (125/63 - 140/69)  BP(mean): --  RR: 18 (05-08-22 @ 22:30) (18 - 18)  SpO2: 100% (05-08-22 @ 22:30) (100% - 100%)    PHYSICAL EXAM:     GENERAL: NAD, lying in bed comfortably  HEAD:  Atraumatic, Normocephalic  EYES: EOMI, PERRLA, conjunctiva and sclera clear, no nystagmus noted  ENT: Moist mucous membranes,   NECK: Supple, No JVD, trachea midline  CHEST/LUNG: Clear to auscultation bilaterally; No rales, rhonchi, wheezing, or rubs. Unlabored respirations  HEART: Regular rate and rhythm; No murmurs, rubs, or gallops, normal S1/S2  ABDOMEN: normal bowel sounds; Soft, nontender, nondistended, no organomegaly   EXTREMITIES:  2+ Peripheral Pulses, brisk capillary refill. No clubbing, cyanosis, or edema  MSK: No gross deformities noted   Neurological:  A&Ox3, no focal deficits   SKIN: No rashes or lesions  PSYCH: Normal mood, affect     TELEMETRY:    LABS:                        9.8    11.59 )-----------( 303      ( 08 May 2022 08:30 )             29.8     05-08    138  |  103  |  41<H>  ----------------------------<  118<H>  4.1   |  26  |  2.69<H>    Ca    8.1<L>      08 May 2022 08:30  Phos  2.2     05-08  Mg     1.80     05-08    TPro  5.4<L>  /  Alb  2.8<L>  /  TBili  <0.2  /  DBili  x   /  AST  14  /  ALT  14  /  AlkPhos  76  05-08            Creatinine Trend: 2.69<--, 2.56<--  I&O's Summary    08 May 2022 07:01  -  09 May 2022 06:53  --------------------------------------------------------  IN: 2300 mL / OUT: 900 mL / NET: 1400 mL      BNP    RADIOLOGY & ADDITIONAL STUDIES:             Internal Medicine   Julia Gardiner | PGY-1     OVERNIGHT EVENTS: No overnight events      SUBJECTIVE: Abdominal pain is at baseline chronic pain from pancreatitis.      MEDICATIONS  (STANDING):  folic acid 1 milliGRAM(s) Oral daily  heparin   Injectable 5000 Unit(s) SubCutaneous every 12 hours  lactated ringers. 1000 milliLiter(s) (100 mL/Hr) IV Continuous <Continuous>  pancrelipase  (CREON  6,000 Lipase Units) 2 Capsule(s) Oral three times a day with meals  pantoprazole    Tablet 40 milliGRAM(s) Oral two times a day  polyethylene glycol 3350 17 Gram(s) Oral two times a day  senna 2 Tablet(s) Oral at bedtime  thiamine 100 milliGRAM(s) Oral daily    MEDICATIONS  (PRN):  acetaminophen     Tablet .. 650 milliGRAM(s) Oral every 6 hours PRN Temp greater or equal to 38C (100.4F), Mild Pain (1 - 3)  aluminum hydroxide/magnesium hydroxide/simethicone Suspension 30 milliLiter(s) Oral every 4 hours PRN Dyspepsia  chlorproMAZINE    Tablet 10 milliGRAM(s) Oral three times a day PRN hiccuping and anxiety  LORazepam   Injectable 0.5 milliGRAM(s) IV Push once PRN before MRCP  melatonin 3 milliGRAM(s) Oral at bedtime PRN Insomnia  ondansetron Injectable 4 milliGRAM(s) IV Push every 8 hours PRN Nausea and/or Vomiting  oxyCODONE    IR 10 milliGRAM(s) Oral every 4 hours PRN Severe Pain (7 - 10)        T(F): 99.1 (05-08-22 @ 22:30), Max: 99.1 (05-08-22 @ 22:30)  HR: 76 (05-08-22 @ 22:30) (69 - 86)  BP: 125/63 (05-08-22 @ 22:30) (125/63 - 140/69)  BP(mean): --  RR: 18 (05-08-22 @ 22:30) (18 - 18)  SpO2: 100% (05-08-22 @ 22:30) (100% - 100%)    PHYSICAL EXAM:     GENERAL: NAD, lying in bed comfortably  HEAD:  Atraumatic, Normocephalic  EYES: EOMI, PERRLA, conjunctiva and sclera clear, no nystagmus noted  ENT: Moist mucous membranes,   NECK: Supple, No JVD, trachea midline  CHEST/LUNG: Clear to auscultation bilaterally; No rales, rhonchi, wheezing, or rubs. Unlabored respirations  HEART: Regular rate and rhythm; No murmurs, rubs, or gallops, normal S1/S2  ABDOMEN: normal bowel sounds; Soft, nondistended, no organomegaly. Tender mostly in epigastric/RUQ region  EXTREMITIES:  2+ Peripheral Pulses, brisk capillary refill. No clubbing, cyanosis, or edema  MSK: No gross deformities noted   Neurological:  A&Ox3, no focal deficits   SKIN: No rashes or lesions  PSYCH: Normal mood, affect     TELEMETRY:    LABS:                        9.8    11.59 )-----------( 303      ( 08 May 2022 08:30 )             29.8     05-08    138  |  103  |  41<H>  ----------------------------<  118<H>  4.1   |  26  |  2.69<H>    Ca    8.1<L>      08 May 2022 08:30  Phos  2.2     05-08  Mg     1.80     05-08    TPro  5.4<L>  /  Alb  2.8<L>  /  TBili  <0.2  /  DBili  x   /  AST  14  /  ALT  14  /  AlkPhos  76  05-08            Creatinine Trend: 2.69<--, 2.56<--  I&O's Summary    08 May 2022 07:01  -  09 May 2022 06:53  --------------------------------------------------------  IN: 2300 mL / OUT: 900 mL / NET: 1400 mL      BNP    RADIOLOGY & ADDITIONAL STUDIES:

## 2022-05-09 NOTE — CHART NOTE - NSCHARTNOTEFT_GEN_A_CORE
Others' Prescriptions  Patient Name: Eitan Palma Date: 1960  Address: [address], [place] [address]Ecv: Male  Rx Written	Rx Dispensed	Drug	Quantity	Days Supply	Prescriber Name	Prescriber Ana #	Payment Method	Dispenser  04/23/2022	04/25/2022	oxycodone hcl (ir) 10 mg tab	42	7	[facility]	[id number]	Insurance	[facility]  03/25/2022	04/01/2022	oxycodone-acetaminophen 5-325 mg tab	112	14	Lamberto Hurtado	[id number]	Medicaid	[id number]  03/09/2022	03/18/2022	oxycodone-acetaminophen 5-325 mg tab	112	14	Lamberto Hurtado	[id number]	Medicaid	[id number]  02/25/2022	03/05/2022	oxycontin er 15 mg tablet	42	21	Lamberto Hurtado	[id number]	Medicaid	[id number]  02/09/2022	02/12/2022	oxycodone-acetaminophen 5-325 mg tab	84	21	Lamberto Hurtado	[id number]	Insurance	[id number]  02/02/2022	02/02/2022	oxycodone-acetaminophen 5-325 mg tablet	40	5	Layne Elizondo	[id number]	Medicaid	[facility]  01/08/2022	01/08/2022	oxycodone-acetaminophen 5-325 mg tab	40	10	Katelynn Erazo	[id number]	Insurance	[id number]
Search Terms: Eitan Chaudhari, 1960 Search Date: 05/09/2022 10:08:12 AM  This report was requested by: Ruben Campos | Reference #: [id number]    Patient Name: Eitan Palma Date: 1960  Address: [address] [place], [place] [address]Hbw: Male    Rx Written	Rx Dispensed	Drug	Quantity	Days Supply	Prescriber Name	Prescriber Ana #	Payment Method	Dispenser  04/23/2022	04/25/2022	oxycodone hcl (ir) 10 mg tab	42	7	[facility]	[id number]	Insurance	[facility]  03/25/2022	04/01/2022	oxycodone-acetaminophen 5-325 mg tab	112	14	Lamberto Hurtado	[id number]	Medicaid	[id number]  03/09/2022	03/18/2022	oxycodone-acetaminophen 5-325 mg tab	112	14	Lamberto Hurtado	[id number]	Medicaid	[id number]  02/25/2022	03/05/2022	oxycontin er 15 mg tablet	42	21	Lamberto Hurtado	[id number]	Medicaid	[id number]  02/09/2022	02/12/2022	oxycodone-acetaminophen 5-325 mg tab	84	21	Lamberto Hurtado	[id number]	[facility] #[id number]  02/02/2022	02/02/2022	oxycodone-acetaminophen 5-325 mg tablet	40	5	Layne Elizondo	[id number]	Medicaid	[facility]  01/08/2022	01/08/2022	oxycodone-acetaminophen 5-325 mg tab	40	10	Katelynn Erazo	[id number]	Insurance	[id number]

## 2022-05-09 NOTE — DIETITIAN INITIAL EVALUATION ADULT - ADD RECOMMEND
2) Discussed menu ordering system, obtained food preferences and will provide additional tray items as able.  3) Consider social work involvement, concern for food insecurity.   4) Continue to monitor electrolytes, replete to WDL as clinically indicated.

## 2022-05-09 NOTE — DIETITIAN INITIAL EVALUATION ADULT - PERTINENT MEDS FT
folic acid, CREON 6,000 Lipase Units 2 Capsules PO TID, pantoprazole Tablet, polyethylene glycol, senna, thiamine, aluminum hydroxide/magnesium hydroxide/simethicone Suspension PRN, ondansetron Injectable PRN

## 2022-05-10 ENCOUNTER — TRANSCRIPTION ENCOUNTER (OUTPATIENT)
Age: 62
End: 2022-05-10

## 2022-05-10 VITALS
TEMPERATURE: 98 F | HEART RATE: 83 BPM | SYSTOLIC BLOOD PRESSURE: 148 MMHG | OXYGEN SATURATION: 100 % | RESPIRATION RATE: 18 BRPM | DIASTOLIC BLOOD PRESSURE: 81 MMHG

## 2022-05-10 PROCEDURE — 99239 HOSP IP/OBS DSCHRG MGMT >30: CPT | Mod: GC

## 2022-05-10 PROCEDURE — 99232 SBSQ HOSP IP/OBS MODERATE 35: CPT | Mod: GC

## 2022-05-10 RX ORDER — OXYCODONE HYDROCHLORIDE 5 MG/1
1 TABLET ORAL
Qty: 28 | Refills: 0
Start: 2022-05-10 | End: 2022-05-16

## 2022-05-10 RX ADMIN — Medication 2 CAPSULE(S): at 13:02

## 2022-05-10 RX ADMIN — Medication 10 MILLIGRAM(S): at 05:06

## 2022-05-10 RX ADMIN — Medication 30 MILLILITER(S): at 10:12

## 2022-05-10 RX ADMIN — OXYCODONE HYDROCHLORIDE 10 MILLIGRAM(S): 5 TABLET ORAL at 05:05

## 2022-05-10 RX ADMIN — Medication 2 CAPSULE(S): at 08:50

## 2022-05-10 RX ADMIN — OXYCODONE HYDROCHLORIDE 10 MILLIGRAM(S): 5 TABLET ORAL at 15:36

## 2022-05-10 RX ADMIN — PANTOPRAZOLE SODIUM 40 MILLIGRAM(S): 20 TABLET, DELAYED RELEASE ORAL at 05:06

## 2022-05-10 RX ADMIN — HEPARIN SODIUM 5000 UNIT(S): 5000 INJECTION INTRAVENOUS; SUBCUTANEOUS at 05:07

## 2022-05-10 RX ADMIN — OXYCODONE HYDROCHLORIDE 10 MILLIGRAM(S): 5 TABLET ORAL at 10:12

## 2022-05-10 RX ADMIN — OXYCODONE HYDROCHLORIDE 10 MILLIGRAM(S): 5 TABLET ORAL at 11:10

## 2022-05-10 RX ADMIN — Medication 100 MILLIGRAM(S): at 13:02

## 2022-05-10 RX ADMIN — OXYCODONE HYDROCHLORIDE 10 MILLIGRAM(S): 5 TABLET ORAL at 05:35

## 2022-05-10 NOTE — DISCHARGE NOTE NURSING/CASE MANAGEMENT/SOCIAL WORK - PATIENT PORTAL LINK FT
You can access the FollowMyHealth Patient Portal offered by [facility] by registering at the following website: [email or web address]. By joining [facility]’s FollowMyHealth portal, you will also be able to view your health information using other applications (apps) compatible with our system.

## 2022-05-10 NOTE — PROGRESS NOTE ADULT - NUTRITIONAL ASSESSMENT
This patient has been assessed with a concern for Malnutrition and has been determined to have a diagnosis/diagnoses of Severe protein-calorie malnutrition and Underweight (BMI < 19).    This patient is being managed with:   Diet Regular-  Low Fat (LOWFAT)  Entered: May  8 2022  4:11PM

## 2022-05-10 NOTE — PROGRESS NOTE ADULT - PROBLEM SELECTOR PLAN 4
Hx CKD baseline 2.6.   - Currently at baseline, will CTM w/ daily Cr

## 2022-05-10 NOTE — PROGRESS NOTE ADULT - PROBLEM SELECTOR PLAN 6
Diet: Regular  DVT: heparin ppx  Dispo: in patient pending results of MRCP

## 2022-05-10 NOTE — PROGRESS NOTE ADULT - SUBJECTIVE AND OBJECTIVE BOX
Interval Events:   No new complaints  Patient had MRI done less than a month ago ( MRN number [id number])    Hospital Medications:  acetaminophen     Tablet .. 650 milliGRAM(s) Oral every 6 hours PRN  aluminum hydroxide/magnesium hydroxide/simethicone Suspension 30 milliLiter(s) Oral every 4 hours PRN  chlorproMAZINE    Tablet 10 milliGRAM(s) Oral three times a day PRN  folic acid 1 milliGRAM(s) Oral daily  heparin   Injectable 5000 Unit(s) SubCutaneous every 12 hours  LORazepam   Injectable 0.5 milliGRAM(s) IV Push once PRN  melatonin 3 milliGRAM(s) Oral at bedtime PRN  ondansetron Injectable 4 milliGRAM(s) IV Push every 8 hours PRN  oxyCODONE    IR 10 milliGRAM(s) Oral every 4 hours PRN  pancrelipase  (CREON  6,000 Lipase Units) 2 Capsule(s) Oral three times a day with meals  pantoprazole    Tablet 40 milliGRAM(s) Oral two times a day  polyethylene glycol 3350 17 Gram(s) Oral two times a day  senna 2 Tablet(s) Oral at bedtime  thiamine 100 milliGRAM(s) Oral daily        ROS:   General:  No fevers, chills or night sweats  ENT:  No sore throat or dysphagia  CV:  No pain or palpitations  Resp:  No dyspnea, cough or  wheezing  GI:  as above  Skin:  No rash or edema  Neuro: no weakness   Hematologic: no bleeding  Musculoskeletal: no muscle pain or join pain  Psych: no agitation      PHYSICAL EXAM:   Vital Signs:  Vital Signs Last 24 Hrs  T(C): 37.2 (10 May 2022 06:37), Max: 37.2 (09 May 2022 21:33)  T(F): 98.9 (10 May 2022 06:37), Max: 99 (09 May 2022 21:33)  HR: 89 (10 May 2022 06:37) (72 - 89)  BP: 136/78 (10 May 2022 06:37) (136/78 - 162/80)  BP(mean): --  RR: 18 (10 May 2022 06:37) (18 - 18)  SpO2: 100% (10 May 2022 06:37) (98% - 100%)  Daily     Daily     GENERAL:  NAD, Appears stated age  HEENT:  NC/AT,  conjunctivae clear and pink, sclera -anicteric  CHEST:  Normal Effort, Breath sounds clear  HEART:  RRR, S1 + S2, no murmurs  ABDOMEN:  Soft, non-tender, non-distended  EXTREMITIES:  no cyanosis or edema  SKIN:  Warm & Dry. No rash or erythema  NEURO:  Alert, oriented, no focal deficit    LABS:                                          9.8    11.59 )-----------( 303      ( 08 May 2022 08:30 )             29.8       Imaging:  ACC: [id number] EXAM:  MR ABDOMEN WAW IC                          PROCEDURE DATE:  04/20/2022          INTERPRETATION:  CLINICAL INFORMATION: Abdominal pain. History of chronic   pancreatitis. Elevated CA 19-9. Evaluate for abdominal tumor.    COMPARISON: No prior MRI abdomen available for comparison. CT abdomen and   pelvis 4/6/2022, 3/1/2022.    CONTRAST/COMPLICATIONS:  IV Contrast: Gadavist  5.5 cc administered   2 cc discarded  Oral Contrast: None  Complications: None reported at time of study completion    PROCEDURE:  MRI of the abdomen was performed.  MRCP was performed.    FINDINGS:  Technically difficult study due to respiratory motion.    LOWER CHEST: Distal esophageal wall thickening with esophageal stent.    LIVER: Within normal limits.  BILE DUCTS: Normal caliber.  GALLBLADDER: Within normal limits.  SPLEEN: Within normal limits.  PANCREAS: Severe main ductal dilatation and irregularity measuring up to   1.7 cm in the body with innumerable dilated side branches, nearly   replacing the pancreatic parenchyma. Diffuse parenchymal calcifications   better appreciated on prior CT. Change in caliber of the main pancreatic   duct upstream of a T2 hypointense filling defect at the pancreatic neck   body junction (5, 21), suspect intraductal calcification when compared to   CT 3/1/2022. Additional intraductal filling defects in the pancreatic   head and neck (5, 21 and 5, 23) also appear to correspond to suspected   intraductal calcifications. No obvious mass is identified.  ADRENALS: Within normal limits.  KIDNEYS/URETERS: Left renal cyst.    VISUALIZED PORTIONS:  BOWEL: Large colonic stool.  PERITONEUM: No ascites.  VESSELS: Within normal limits.  RETROPERITONEUM/LYMPH NODES: No lymphadenopathy.  ABDOMINAL WALL: Within normal limits.  BONES: Within normal limits.    IMPRESSION:    Severe dilation of the main pancreatic duct with innumerable dilated   sidebranches, with caliber change at the neck/body junction. Favor   sequela of chronic pancreatitis with obstructing intraductal calculi. No   obvious enhancing mass on MR within the limitations of a markedly motion   degraded exam.    Distal esophageal wall thickening with esophageal stent. Correlate with   endoscopy.    --- End of Report ---

## 2022-05-10 NOTE — PROGRESS NOTE ADULT - ASSESSMENT
61M with pmh alcohol abuse (sober x1 year), chronic pancreatitis, chronic esophagitis with stricture (s/p stent 4/19/22), CKD4 w/ recent admission for esophageal stricture s/p stent placement (4/19), now coming in as a transfer from  for worsening upper abdominal pain, found to have a possible pancreatic duct stone on CT, pending MRCP.     FYI: patient has other chart MRN# [id number]

## 2022-05-10 NOTE — PROGRESS NOTE ADULT - PROBLEM SELECTOR PLAN 1
Concern for acute on chronic pancreatitis vs possible PD duct stone. lipase wnl. possible PD duct stone, however unclear if actually present given diffuse pancreatic calcifications. No dysphagia or globus sensation.   - GI following  - Will obtain MRCP for better characterization of possible PD duct stone   - Zofran, Maalox PRN for nausea and dyspepsia    - Qtc 449 on 4/26  - Tolerating regular diet well

## 2022-05-10 NOTE — PROGRESS NOTE ADULT - ASSESSMENT
61 year old male with history of chronic pancreatitis, HCV s/p SVR, and esophageal stricture s/p stent placement 4/19/2022 c/b need for reposition & suture 4/29/2022 who presents with abdominal pain.    Impression:   # Acute on chronic pancreatitis - resolved.  # Chronic PD dilatation with known 1.6cm PD stone on MRI 04/2022   # Esophageal stricture s/p stent placement 4/19/2022 c/b need for reposition & suture 4/29/2022      Recommendations:  - Patient may benefit from either PD stone extraction or PD stenting. However, cannot pass ERCP scope through esophagus at this time given severity of stricture.   - Outpatient f/u with Dr. Hinds in 3-4 weeks for repeat EGD to remove stent. Patient can call [phone] to schedule.  At that time can discuss possibility of future ERCP w/ PD stone removal or PD stenting.      Lyudmila Orta MD  Gastroenterology Fellow  Pager: [phone]  Please call answering service [phone] / on-call GI fellow after 5pm and before 8am, and on weekends. 61 year old male with history of chronic pancreatitis, HCV s/p SVR, and esophageal stricture s/p stent placement 4/19/2022 c/b need for reposition & suture 4/29/2022 who presents with abdominal pain.    Impression:   # Acute on chronic pancreatitis - resolved.  # Chronic PD dilatation with known 1.6cm PD stone on MRI 04/2022   # Esophageal stricture s/p stent placement 4/19/2022 c/b need for reposition & suture 4/29/2022      Recommendations:  - Patient may benefit from either PD stone extraction or PD stenting. However, cannot pass ERCP scope through esophagus at this time given severity of stricture.   - Outpatient f/u with Dr. Edwards in 4-8 weeks for repeat EGD to remove stent. Patient can call [phone] to schedule.  At that time can discuss possibility of future ERCP w/ PD stone removal or PD stenting.      Lyudmila Orta MD  Gastroenterology Fellow  Pager: [phone]  Please call answering service [phone] / on-call GI fellow after 5pm and before 8am, and on weekends.

## 2022-05-10 NOTE — PROGRESS NOTE ADULT - PROBLEM SELECTOR PLAN 5
Remote hx of etoh abuse. Sober for > 1 year  - Continue folate and thiamine

## 2022-05-10 NOTE — PROGRESS NOTE ADULT - PROBLEM SELECTOR PLAN 2
Hx chronic pancreatitis w/ pancreatic insufficiency  - Continue home oxycodone 10 mg Q4hrs PRN  - Continue Creon

## 2022-05-10 NOTE — PROGRESS NOTE ADULT - SUBJECTIVE AND OBJECTIVE BOX
Internal Medicine   Julia Gardiner | PGY-1     OVERNIGHT EVENTS:      SUBJECTIVE:       MEDICATIONS  (STANDING):  folic acid 1 milliGRAM(s) Oral daily  heparin   Injectable 5000 Unit(s) SubCutaneous every 12 hours  pancrelipase  (CREON  6,000 Lipase Units) 2 Capsule(s) Oral three times a day with meals  pantoprazole    Tablet 40 milliGRAM(s) Oral two times a day  polyethylene glycol 3350 17 Gram(s) Oral two times a day  senna 2 Tablet(s) Oral at bedtime  thiamine 100 milliGRAM(s) Oral daily    MEDICATIONS  (PRN):  acetaminophen     Tablet .. 650 milliGRAM(s) Oral every 6 hours PRN Temp greater or equal to 38C (100.4F), Mild Pain (1 - 3)  aluminum hydroxide/magnesium hydroxide/simethicone Suspension 30 milliLiter(s) Oral every 4 hours PRN Dyspepsia  chlorproMAZINE    Tablet 10 milliGRAM(s) Oral three times a day PRN hiccuping and anxiety  LORazepam   Injectable 0.5 milliGRAM(s) IV Push once PRN before MRCP  melatonin 3 milliGRAM(s) Oral at bedtime PRN Insomnia  ondansetron Injectable 4 milliGRAM(s) IV Push every 8 hours PRN Nausea and/or Vomiting  oxyCODONE    IR 10 milliGRAM(s) Oral every 4 hours PRN Severe Pain (7 - 10)        T(F): 98.9 (05-10-22 @ 06:37), Max: 99 (05-09-22 @ 21:33)  HR: 89 (05-10-22 @ 06:37) (72 - 89)  BP: 136/78 (05-10-22 @ 06:37) (136/78 - 162/80)  BP(mean): --  RR: 18 (05-10-22 @ 06:37) (18 - 18)  SpO2: 100% (05-10-22 @ 06:37) (98% - 100%)    PHYSICAL EXAM:     GENERAL: NAD, lying in bed comfortably  HEAD:  Atraumatic, Normocephalic  EYES: EOMI, PERRLA, conjunctiva and sclera clear, no nystagmus noted  ENT: Moist mucous membranes,   NECK: Supple, No JVD, trachea midline  CHEST/LUNG: Clear to auscultation bilaterally; No rales, rhonchi, wheezing, or rubs. Unlabored respirations  HEART: Regular rate and rhythm; No murmurs, rubs, or gallops, normal S1/S2  ABDOMEN: normal bowel sounds; Soft, nontender, nondistended, no organomegaly   EXTREMITIES:  2+ Peripheral Pulses, brisk capillary refill. No clubbing, cyanosis, or edema  MSK: No gross deformities noted   Neurological:  A&Ox3, no focal deficits   SKIN: No rashes or lesions  PSYCH: Normal mood, affect     TELEMETRY:    LABS:                        9.8    11.59 )-----------( 303      ( 08 May 2022 08:30 )             29.8     05-08    138  |  103  |  41<H>  ----------------------------<  118<H>  4.1   |  26  |  2.69<H>    Ca    8.1<L>      08 May 2022 08:30  Phos  2.2     05-08  Mg     1.80     05-08    TPro  5.4<L>  /  Alb  2.8<L>  /  TBili  <0.2  /  DBili  x   /  AST  14  /  ALT  14  /  AlkPhos  76  05-08            Creatinine Trend: 2.69<--, 2.56<--  I&O's Summary    BNP    RADIOLOGY & ADDITIONAL STUDIES:             Internal Medicine   Julia Gardiner | PGY-1     OVERNIGHT EVENTS: No overnight events      SUBJECTIVE: Patient says abdominal is still at his normal baseline and expresses concern over MRCP given anxiety       MEDICATIONS  (STANDING):  folic acid 1 milliGRAM(s) Oral daily  heparin   Injectable 5000 Unit(s) SubCutaneous every 12 hours  pancrelipase  (CREON  6,000 Lipase Units) 2 Capsule(s) Oral three times a day with meals  pantoprazole    Tablet 40 milliGRAM(s) Oral two times a day  polyethylene glycol 3350 17 Gram(s) Oral two times a day  senna 2 Tablet(s) Oral at bedtime  thiamine 100 milliGRAM(s) Oral daily    MEDICATIONS  (PRN):  acetaminophen     Tablet .. 650 milliGRAM(s) Oral every 6 hours PRN Temp greater or equal to 38C (100.4F), Mild Pain (1 - 3)  aluminum hydroxide/magnesium hydroxide/simethicone Suspension 30 milliLiter(s) Oral every 4 hours PRN Dyspepsia  chlorproMAZINE    Tablet 10 milliGRAM(s) Oral three times a day PRN hiccuping and anxiety  LORazepam   Injectable 0.5 milliGRAM(s) IV Push once PRN before MRCP  melatonin 3 milliGRAM(s) Oral at bedtime PRN Insomnia  ondansetron Injectable 4 milliGRAM(s) IV Push every 8 hours PRN Nausea and/or Vomiting  oxyCODONE    IR 10 milliGRAM(s) Oral every 4 hours PRN Severe Pain (7 - 10)        T(F): 98.9 (05-10-22 @ 06:37), Max: 99 (05-09-22 @ 21:33)  HR: 89 (05-10-22 @ 06:37) (72 - 89)  BP: 136/78 (05-10-22 @ 06:37) (136/78 - 162/80)  BP(mean): --  RR: 18 (05-10-22 @ 06:37) (18 - 18)  SpO2: 100% (05-10-22 @ 06:37) (98% - 100%)    PHYSICAL EXAM:     GENERAL: NAD, lying in bed comfortably  HEAD:  Atraumatic, Normocephalic  EYES: EOMI, PERRLA, conjunctiva and sclera clear, no nystagmus noted  ENT: Moist mucous membranes,   NECK: Supple, No JVD, trachea midline  CHEST/LUNG: Clear to auscultation bilaterally; No rales, rhonchi, wheezing, or rubs. Unlabored respirations  HEART: Regular rate and rhythm; No murmurs, rubs, or gallops, normal S1/S2  ABDOMEN: normal bowel sounds; Soft, nondistended, no organomegaly. Tender to palpation particularly in epigastric region  EXTREMITIES:  2+ Peripheral Pulses, brisk capillary refill. No clubbing, cyanosis, or edema  MSK: No gross deformities noted   Neurological:  A&Ox3, no focal deficits   SKIN: No rashes or lesions  PSYCH: Normal mood, affect     TELEMETRY:    LABS:                        9.8    11.59 )-----------( 303      ( 08 May 2022 08:30 )             29.8     05-08    138  |  103  |  41<H>  ----------------------------<  118<H>  4.1   |  26  |  2.69<H>    Ca    8.1<L>      08 May 2022 08:30  Phos  2.2     05-08  Mg     1.80     05-08    TPro  5.4<L>  /  Alb  2.8<L>  /  TBili  <0.2  /  DBili  x   /  AST  14  /  ALT  14  /  AlkPhos  76  05-08            Creatinine Trend: 2.69<--, 2.56<--  I&O's Summary    BNP    RADIOLOGY & ADDITIONAL STUDIES:

## 2022-05-10 NOTE — PROGRESS NOTE ADULT - REASON FOR ADMISSION
Abd pain post-esophageal stent placement

## 2022-05-10 NOTE — DISCHARGE NOTE NURSING/CASE MANAGEMENT/SOCIAL WORK - NSDCPEFALRISK_GEN_ALL_CORE
For information on Fall & Injury Prevention, visit: [email or web address] OR  [email or web address] OR  https://www.cdc.gov/steadi/patient.html

## 2022-05-10 NOTE — DISCHARGE NOTE NURSING/CASE MANAGEMENT/SOCIAL WORK - NSDCFUADDAPPT_GEN_ALL_CORE_FT
1. Please follow up with your PCP (5/18 at 1:20 PM)  2. Please be sure to follow up with your nephrologist. You were scheduled for an appointment but were admitted to the hospital.  3. Please follow up with GI (5/12 at 9 am)

## 2022-05-10 NOTE — PROGRESS NOTE ADULT - PROBLEM SELECTOR PLAN 3
Hx esophageal stricture s/p dilation and stent placement on 4/19. No signs of dysphagia or odynophagia to be concerned for malpositioned stent.   - Protonix 40 mg BID for 8 weeks

## 2022-05-10 NOTE — PROGRESS NOTE ADULT - ATTENDING COMMENTS
As above  Abdominal pain, chronic calcific pancreatitis  Treat pain  Followup MRCP  Low fat diet as tolerated    Thank you for this interesting consult.  Please call the advanced GI service with any questions or concerns.
Patient seen and examined with the GI fellow. I agree with the above assessment and plan. Thank you for allowing us to care for your patient.
61M with pmh alcohol abuse (sober x1 year), chronic pancreatitis, chronic esophagitis with stricture (s/p stent 4/19/22), CKD4 w/ recent admission for esophageal stricture s/p stent placement (4/19), now coming in as a transfer from  for worsening upper abdominal pain, possibly in setting of acute on chronic pancreatitis with possible pancreatic calcifications vs stone.  -GI evaluated; pending MRCP before proceeding with a possible ERCP.   -WBC uptrending, current no other signs of systemic infection. monitor off abx for now. start IV hydration  -c/w supportive care, pain control    rest of the plan as above
61 year old male with pmh alcohol abuse (sober x1 year), chronic pancreatitis, chronic esophagitis with stricture (s/p esophageal stent 4/19/22), CKD4 w/ recent admission for esophageal stricture s/p stent placement (4/19), now coming in as a transfer from  for worsening upper abdominal pain, found to have a possible pancreatic duct stone on CT, pending MRCP.    patient seen and examined at bedside. patient reports his abdominal pain currently is at his baseline. denies nausea or vomiting. is tolerating diet.     #Abdominal pain  -possible 1.4cm pancreatic duct stone on CTAP  -MRCP pending  -ISTOP reviewed- c/w oxycodone PRN    #Chronic pancreatitis  -c/w home Creon    #CKD Stage 4  -monitor Cr  -will need outpatient nephrology follow up    D/w HS 8
61 year old male with pmh alcohol abuse (sober x1 year), chronic pancreatitis, chronic esophagitis with stricture (s/p esophageal stent 4/19/22), CKD4 w/ recent admission for esophageal stricture s/p stent placement (4/19), now coming in as a transfer from  for worsening upper abdominal pain, found to have a possible pancreatic duct stone on CT, pending MRCP.    patient seen and examined at bedside. patient reports his abdominal pain currently is at his baseline. denies nausea or vomiting. is tolerating diet.     #Abdominal pain  -possible 1.4cm pancreatic duct stone on CTAP  -previous imaging on separate MRN reviewed- patient had CTAP with similar findings of possible pancreatic duct stone, with MRI abdomen showing likely intraductal calcification vs stone  -given resolution of abdominal pain and unchanged imaging from prior, will d/c MRCP at this time  -GI following- will need outpatient follow up in 4-8 weeks with Dr. Edwards to evaluate for potential future ERCP for stone removal  -ISTOP reviewed- c/w oxycodone PRN    #Chronic pancreatitis  -c/w home Creon    #CKD Stage 4  -monitor Cr  -will need outpatient nephrology follow up  -patient educated on importance of rescheduling his outpatient appt with nephrologist Dr. Fraga      d/c home today  D/w HS 8

## 2022-05-11 RX ORDER — GABAPENTIN 400 MG/1
6 CAPSULE ORAL
Qty: 0 | Refills: 0 | DISCHARGE

## 2022-05-11 RX ORDER — SUCRALFATE 1 G
1 TABLET ORAL
Qty: 0 | Refills: 0 | DISCHARGE

## 2022-05-11 RX ORDER — OXYCODONE HYDROCHLORIDE 5 MG/1
1 TABLET ORAL
Qty: 0 | Refills: 0 | DISCHARGE

## 2022-05-12 LAB
CULTURE RESULTS: SIGNIFICANT CHANGE UP
SPECIMEN SOURCE: SIGNIFICANT CHANGE UP

## 2022-05-16 DIAGNOSIS — K83.09 OTHER CHOLANGITIS: ICD-10-CM

## 2022-05-16 PROBLEM — I10 ESSENTIAL (PRIMARY) HYPERTENSION: Chronic | Status: ACTIVE | Noted: 2022-05-07

## 2022-05-16 PROBLEM — Z87.19 PERSONAL HISTORY OF OTHER DISEASES OF THE DIGESTIVE SYSTEM: Chronic | Status: ACTIVE | Noted: 2022-05-07

## 2022-05-18 ENCOUNTER — OUTPATIENT (OUTPATIENT)
Dept: OUTPATIENT SERVICES | Facility: HOSPITAL | Age: 62
LOS: 1 days | End: 2022-05-18

## 2022-05-18 ENCOUNTER — APPOINTMENT (OUTPATIENT)
Dept: INTERNAL MEDICINE | Facility: CLINIC | Age: 62
End: 2022-05-18
Payer: MEDICAID

## 2022-05-18 VITALS
OXYGEN SATURATION: 96 % | BODY MASS INDEX: 15.77 KG/M2 | HEIGHT: 73 IN | WEIGHT: 119 LBS | SYSTOLIC BLOOD PRESSURE: 124 MMHG | DIASTOLIC BLOOD PRESSURE: 70 MMHG | HEART RATE: 88 BPM | TEMPERATURE: 97.7 F

## 2022-05-18 DIAGNOSIS — K25.5 CHRONIC OR UNSPECIFIED GASTRIC ULCER WITH PERFORATION: Chronic | ICD-10-CM

## 2022-05-18 PROCEDURE — 99214 OFFICE O/P EST MOD 30 MIN: CPT

## 2022-05-18 RX ORDER — NALOXONE HYDROCHLORIDE 4 MG/.1ML
4 SPRAY NASAL
Qty: 2 | Refills: 2 | Status: ACTIVE | COMMUNITY
Start: 2022-03-09 | End: 1900-01-01

## 2022-05-19 ENCOUNTER — APPOINTMENT (OUTPATIENT)
Dept: GASTROENTEROLOGY | Facility: CLINIC | Age: 62
End: 2022-05-19
Payer: MEDICAID

## 2022-05-19 ENCOUNTER — NON-APPOINTMENT (OUTPATIENT)
Age: 62
End: 2022-05-19

## 2022-05-19 ENCOUNTER — OUTPATIENT (OUTPATIENT)
Dept: OUTPATIENT SERVICES | Facility: HOSPITAL | Age: 62
LOS: 1 days | End: 2022-05-19

## 2022-05-19 VITALS
HEART RATE: 89 BPM | OXYGEN SATURATION: 98 % | RESPIRATION RATE: 20 BRPM | DIASTOLIC BLOOD PRESSURE: 60 MMHG | SYSTOLIC BLOOD PRESSURE: 126 MMHG | HEIGHT: 73 IN | TEMPERATURE: 96.7 F | WEIGHT: 116.5 LBS | BODY MASS INDEX: 15.44 KG/M2

## 2022-05-19 DIAGNOSIS — K86.1 OTHER CHRONIC PANCREATITIS: ICD-10-CM

## 2022-05-19 DIAGNOSIS — K22.2 ESOPHAGEAL OBSTRUCTION: ICD-10-CM

## 2022-05-19 DIAGNOSIS — Z12.11 ENCOUNTER FOR SCREENING FOR MALIGNANT NEOPLASM OF COLON: ICD-10-CM

## 2022-05-19 DIAGNOSIS — K25.5 CHRONIC OR UNSPECIFIED GASTRIC ULCER WITH PERFORATION: Chronic | ICD-10-CM

## 2022-05-19 PROCEDURE — 99203 OFFICE O/P NEW LOW 30 MIN: CPT

## 2022-05-19 PROCEDURE — 99213 OFFICE O/P EST LOW 20 MIN: CPT

## 2022-05-19 RX ORDER — POLYETHYLENE GLYCOL 3350 17 G/17G
17 POWDER, FOR SOLUTION ORAL
Qty: 6 | Refills: 0 | Status: ACTIVE | COMMUNITY
Start: 2022-05-19 | End: 1900-01-01

## 2022-05-19 NOTE — ASSESSMENT
[FreeTextEntry1] : # Chronic pancreatitis, PD dilatation with known 1.6cm PD stone on MRI 04/2022 \par # Esophageal stricture s/p stent placement 4/19/2022 c/b need for reposition & suture 4/29/2022. \par # Colon cancer screening - had colonoscopy many years ago but he is not sure of the result\par #  HCV s/p SVR\par # Chronic pain\par \par Plan:\par - Will refer to surgery for consideration for [place] procedure given chronic pain and PD dilation with PD stone and esophageal stricture - not candidate for endoscopic interventions. \par \par - Will plan for EGD and stent removal in 3 months \par - Will also plan for colonoscopy at the same time for colon cancer screening - 2 days prep\par - Will discuss starting Creon next visit                                              \par

## 2022-05-19 NOTE — END OF VISIT
[] : Fellow [FreeTextEntry3] : Agree w above. Rec surgical evaluation for chronic pancreatitis w dilated PD w stones. Rec EGD w removal of stent in 3 months. Rec screening colonoscopy.

## 2022-05-19 NOTE — PHYSICAL EXAM
[General Appearance - Alert] : alert [General Appearance - In No Acute Distress] : in no acute distress [] : the neck was supple [Bowel Sounds] : normal bowel sounds [Abdomen Soft] : soft [Oriented To Time, Place, And Person] : oriented to person, place, and time

## 2022-05-19 NOTE — HISTORY OF PRESENT ILLNESS
[Heartburn] : denies heartburn [Nausea] : denies nausea [Vomiting] : denies vomiting [Diarrhea] : denies diarrhea [Constipation] : denies constipation [Yellow Skin Or Eyes (Jaundice)] : denies jaundice [Abdominal Pain] : stable abdominal pain [Abdominal Swelling] : denies abdominal swelling [Rectal Pain] : denies rectal pain [de-identified] : 61 y.o M with PMHx of ETOH abuse (quit about 4-5 years ago), chronic pancreatitis, HCV s/p SVR, esophageal stricture s/p stent placement 4/19/2022, migrated - need for reposition & suture on 4/29/2022 , gout, gastric perf in 2019 (s/p repair) presenting for pain control follow up. \par He is also known to have chronic PD dilatation with known 1.6 cm PD stone on MRI 04/2022.\par \par Today patient reports constant abdominal / epigastric pain that is controlled with pain with medication. He has been admitted multiple times for pain control.\par He denies any dysphagia or chest discomfort .

## 2022-05-20 DIAGNOSIS — N18.4 CHRONIC KIDNEY DISEASE, STAGE 4 (SEVERE): ICD-10-CM

## 2022-05-20 DIAGNOSIS — F11.90 OPIOID USE, UNSPECIFIED, UNCOMPLICATED: ICD-10-CM

## 2022-05-20 NOTE — HISTORY OF PRESENT ILLNESS
[FreeTextEntry1] : Hospital Follow up [de-identified] : Patient is a 61 y.o M with PMHx of ETOH abuse (quit about 4-5 years ago), chronic pancreatitis, Hep C (past history of treatment), gout, gastric perf in 2019 (s/p repair) prsenting for post discharge follow up. On agenda setting patient wants to discuss chronic pain. \par \par Patient had multiple admissions since last visit. Multiple hospital courses surrounding abdominal ppain. Found to have acute on chronic pancreatitis c/b inductal stone not removed due to newly found esophageal stricture s/p Stenting 4/19/22 and readjustment and suturing in 4/25. Patient discharged with plans for GI follow up for stent removal and MRCP and follow up with renal for CKD stage 4. Patient notes he feels the stent healing him and that he can now eat food successully with some improvement in pain\par \par Patient was stable on Percocet 5mg (2 tabs q6h prn) (60 MME) prior to acute on chronic pancreatitis. PEG scales were at 10 average on every visit. Patient notes upon one discharge he was prescribed oxycodone 10mg q4h and noticed it worked for controlling his pain. he feels that every 6 hours is too far apart. Patient describes pain as aches and chronic. Patient admits to taking the medication more frequently than prescribed in the past and would like to increase the frequency if possible. PEG scale today (10 pain on average, 10 interference with enjoyment, 10 interfering with activities) 10 average. Patient notes unable to do any daily activities due t pain. ISTOP review and pasted below. The only prescribers of opioids aside from myself are those prescribed during hospitalization. No appearance of doctor shopping. Discussed importance of having Narcan available to teach himself and his mother in case he overdoses. Patient given Narcan kit in office and educatedf using training sample. Patient expressed understanding\par \par \par My Prescriptions\par \par \par \par Patient Name: Eitan Chaudhari \par \par YOB: 1960 \par \par \par Address: [address] [place] [address] \par \par Sex: Male \par \par \par \par \par \par \par \par \par \par Rx Written\par \par Rx Dispensed\par \par Drug\par \par Quantity\par \par Days Supply\par \par Prescriber Name\par \par Prescriber Ana #\par \par Payment Method\par \par Dispenser\par \par \par 05/11/2022 05/11/2022 oxycodone-acetaminophen 5-325 mg tab  112 14 [facility] [id number] Medicaid [id number] \par 03/25/2022 04/01/2022 oxycodone-acetaminophen 5-325 mg tab  112 14 [id number] Medicaid [id number] \par 03/09/2022 03/18/2022 oxycodone-acetaminophen 5-325 mg tab  112 14 [id number] Medicaid [id number] \par 02/25/2022 03/05/2022 oxycontin er 15 mg tablet  42 21 [id number] Medicaid [facility] #[id number] \par 02/09/2022 02/12/2022 oxycodone-acetaminophen 5-325 mg tab  84 21 [id number] Insurance [facility] #[id number] \par \par \par \par \par \par \par  Others' Prescriptions\par \par \par \par Patient Name: Eitan Chaudhari \par \par YOB: 1960 \par \par \par Address: [address] [place] [address] \par \par Sex: Male \par \par \par \par \par \par \par \par \par \par Rx Written\par \par Rx Dispensed\par \par Drug\par \par Quantity\par \par Days Supply\par \par Prescriber Name\par \par Prescriber Ana #\par \par Payment Method\par \par Dispenser\par \par \par 05/10/2022 05/10/2022 oxycodone hcl (ir) 10 mg tab  28 7 [facility] [id number] Medicaid [facility] \par 04/23/2022 04/25/2022 oxycodone hcl (ir) 10 mg tab  42 7 [facility] [id number] Insurance [facility] \par 02/02/2022 02/02/2022 oxycodone-acetaminophen 5-325 mg tablet  40 5 Layne Elizondo [id number] Medicaid [facility] \par 01/08/2022 01/08/2022 oxycodone-acetaminophen 5-325 mg tab  40 10 Katelynn Erazo [id number] Insurance [facility] #[id number] \par

## 2022-06-09 ENCOUNTER — INPATIENT (INPATIENT)
Facility: HOSPITAL | Age: 62
LOS: 4 days | Discharge: HOME | End: 2022-06-14
Attending: HOSPITALIST | Admitting: HOSPITALIST
Payer: COMMERCIAL

## 2022-06-09 VITALS
OXYGEN SATURATION: 98 % | WEIGHT: 117.07 LBS | DIASTOLIC BLOOD PRESSURE: 65 MMHG | RESPIRATION RATE: 20 BRPM | TEMPERATURE: 98 F | HEIGHT: 74 IN | SYSTOLIC BLOOD PRESSURE: 99 MMHG | HEART RATE: 99 BPM

## 2022-06-09 DIAGNOSIS — N18.4 CHRONIC KIDNEY DISEASE, STAGE 4 (SEVERE): ICD-10-CM

## 2022-06-09 DIAGNOSIS — K25.5 CHRONIC OR UNSPECIFIED GASTRIC ULCER WITH PERFORATION: Chronic | ICD-10-CM

## 2022-06-09 DIAGNOSIS — K22.2 ESOPHAGEAL OBSTRUCTION: ICD-10-CM

## 2022-06-09 DIAGNOSIS — K86.1 OTHER CHRONIC PANCREATITIS: ICD-10-CM

## 2022-06-09 DIAGNOSIS — D64.9 ANEMIA, UNSPECIFIED: ICD-10-CM

## 2022-06-09 DIAGNOSIS — E87.2 ACIDOSIS: ICD-10-CM

## 2022-06-09 LAB
ALBUMIN SERPL ELPH-MCNC: 2.9 G/DL — LOW (ref 3.3–5)
ALP SERPL-CCNC: 84 U/L — SIGNIFICANT CHANGE UP (ref 40–120)
ALT FLD-CCNC: 28 U/L — SIGNIFICANT CHANGE UP (ref 12–78)
ANION GAP SERPL CALC-SCNC: <5 MMOL/L — SIGNIFICANT CHANGE UP (ref 5–17)
APAP SERPL-MCNC: <2 UG/ML — LOW (ref 10–30)
AST SERPL-CCNC: 31 U/L — SIGNIFICANT CHANGE UP (ref 15–37)
BASE EXCESS BLDA CALC-SCNC: 21.8 MMOL/L — HIGH (ref -2–3)
BASOPHILS # BLD AUTO: 0.07 K/UL — SIGNIFICANT CHANGE UP (ref 0–0.2)
BASOPHILS NFR BLD AUTO: 0.7 % — SIGNIFICANT CHANGE UP (ref 0–2)
BILIRUB SERPL-MCNC: 0.2 MG/DL — SIGNIFICANT CHANGE UP (ref 0.2–1.2)
BLOOD GAS COMMENTS ARTERIAL: SIGNIFICANT CHANGE UP
BLOOD GAS COMMENTS ARTERIAL: SIGNIFICANT CHANGE UP
BUN SERPL-MCNC: 48 MG/DL — HIGH (ref 7–23)
CALCIUM SERPL-MCNC: 9.2 MG/DL — SIGNIFICANT CHANGE UP (ref 8.5–10.1)
CHLORIDE SERPL-SCNC: 90 MMOL/L — LOW (ref 96–108)
CO2 BLDA-SCNC: 48 MMOL/L — HIGH (ref 19–24)
CO2 SERPL-SCNC: >45 MMOL/L — CRITICAL HIGH (ref 22–31)
CREAT SERPL-MCNC: 2.61 MG/DL — HIGH (ref 0.5–1.3)
EGFR: 27 ML/MIN/1.73M2 — LOW
EOSINOPHIL # BLD AUTO: 0.36 K/UL — SIGNIFICANT CHANGE UP (ref 0–0.5)
EOSINOPHIL NFR BLD AUTO: 3.4 % — SIGNIFICANT CHANGE UP (ref 0–6)
ETHANOL SERPL-MCNC: <10 MG/DL — SIGNIFICANT CHANGE UP (ref 0–10)
FLUAV AG NPH QL: SIGNIFICANT CHANGE UP
FLUBV AG NPH QL: SIGNIFICANT CHANGE UP
GAS PNL BLDA: SIGNIFICANT CHANGE UP
GLUCOSE SERPL-MCNC: 91 MG/DL — SIGNIFICANT CHANGE UP (ref 70–99)
HCO3 BLDA-SCNC: 47 MMOL/L — CRITICAL HIGH (ref 21–28)
HCT VFR BLD CALC: 37.3 % — LOW (ref 39–50)
HGB BLD-MCNC: 11.6 G/DL — LOW (ref 13–17)
HOROWITZ INDEX BLDA+IHG-RTO: 21 — SIGNIFICANT CHANGE UP
IMM GRANULOCYTES NFR BLD AUTO: 0.6 % — SIGNIFICANT CHANGE UP (ref 0–1.5)
LACTATE SERPL-SCNC: 1.7 MMOL/L — SIGNIFICANT CHANGE UP (ref 0.7–2)
LIDOCAIN IGE QN: 116 U/L — SIGNIFICANT CHANGE UP (ref 73–393)
LYMPHOCYTES # BLD AUTO: 2.12 K/UL — SIGNIFICANT CHANGE UP (ref 1–3.3)
LYMPHOCYTES # BLD AUTO: 20 % — SIGNIFICANT CHANGE UP (ref 13–44)
MCHC RBC-ENTMCNC: 30.9 PG — SIGNIFICANT CHANGE UP (ref 27–34)
MCHC RBC-ENTMCNC: 31.1 G/DL — LOW (ref 32–36)
MCV RBC AUTO: 99.2 FL — SIGNIFICANT CHANGE UP (ref 80–100)
MONOCYTES # BLD AUTO: 0.64 K/UL — SIGNIFICANT CHANGE UP (ref 0–0.9)
MONOCYTES NFR BLD AUTO: 6 % — SIGNIFICANT CHANGE UP (ref 2–14)
NEUTROPHILS # BLD AUTO: 7.33 K/UL — SIGNIFICANT CHANGE UP (ref 1.8–7.4)
NEUTROPHILS NFR BLD AUTO: 69.3 % — SIGNIFICANT CHANGE UP (ref 43–77)
NRBC # BLD: 0 /100 WBCS — SIGNIFICANT CHANGE UP (ref 0–0)
PCO2 BLDA: 50 MMHG — HIGH (ref 32–46)
PH BLDA: 7.58 — HIGH (ref 7.35–7.45)
PLATELET # BLD AUTO: 277 K/UL — SIGNIFICANT CHANGE UP (ref 150–400)
PO2 BLDA: 71 MMHG — LOW (ref 83–108)
POTASSIUM SERPL-MCNC: 3.8 MMOL/L — SIGNIFICANT CHANGE UP (ref 3.5–5.3)
POTASSIUM SERPL-SCNC: 3.8 MMOL/L — SIGNIFICANT CHANGE UP (ref 3.5–5.3)
PROT SERPL-MCNC: 6.8 GM/DL — SIGNIFICANT CHANGE UP (ref 6–8.3)
RBC # BLD: 3.76 M/UL — LOW (ref 4.2–5.8)
RBC # FLD: 17.9 % — HIGH (ref 10.3–14.5)
SALICYLATES SERPL-MCNC: 5 MG/DL — SIGNIFICANT CHANGE UP (ref 2.8–20)
SAO2 % BLDA: 97.3 % — SIGNIFICANT CHANGE UP (ref 94–98)
SARS-COV-2 RNA SPEC QL NAA+PROBE: SIGNIFICANT CHANGE UP
SODIUM SERPL-SCNC: 140 MMOL/L — SIGNIFICANT CHANGE UP (ref 135–145)
WBC # BLD: 10.58 K/UL — HIGH (ref 3.8–10.5)
WBC # FLD AUTO: 10.58 K/UL — HIGH (ref 3.8–10.5)

## 2022-06-09 PROCEDURE — 74176 CT ABD & PELVIS W/O CONTRAST: CPT | Mod: 26,MA

## 2022-06-09 PROCEDURE — 99284 EMERGENCY DEPT VISIT MOD MDM: CPT

## 2022-06-09 PROCEDURE — 99223 1ST HOSP IP/OBS HIGH 75: CPT

## 2022-06-09 PROCEDURE — 93010 ELECTROCARDIOGRAM REPORT: CPT

## 2022-06-09 RX ORDER — CHLORPROMAZINE HCL 10 MG
10 TABLET ORAL EVERY 8 HOURS
Refills: 0 | Status: DISCONTINUED | OUTPATIENT
Start: 2022-06-09 | End: 2022-06-09

## 2022-06-09 RX ORDER — CYCLOBENZAPRINE HYDROCHLORIDE 10 MG/1
1 TABLET, FILM COATED ORAL
Qty: 0 | Refills: 0 | DISCHARGE

## 2022-06-09 RX ORDER — THIAMINE MONONITRATE (VIT B1) 100 MG
100 TABLET ORAL DAILY
Refills: 0 | Status: DISCONTINUED | OUTPATIENT
Start: 2022-06-09 | End: 2022-06-14

## 2022-06-09 RX ORDER — SODIUM CHLORIDE 9 MG/ML
1000 INJECTION INTRAMUSCULAR; INTRAVENOUS; SUBCUTANEOUS
Refills: 0 | Status: DISCONTINUED | OUTPATIENT
Start: 2022-06-09 | End: 2022-06-14

## 2022-06-09 RX ORDER — CHLORPROMAZINE HCL 10 MG
1 TABLET ORAL
Qty: 0 | Refills: 0 | DISCHARGE

## 2022-06-09 RX ORDER — SODIUM CHLORIDE 9 MG/ML
1000 INJECTION INTRAMUSCULAR; INTRAVENOUS; SUBCUTANEOUS ONCE
Refills: 0 | Status: COMPLETED | OUTPATIENT
Start: 2022-06-09 | End: 2022-06-09

## 2022-06-09 RX ORDER — POLYETHYLENE GLYCOL 3350 17 G/17G
17 POWDER, FOR SOLUTION ORAL
Refills: 0 | Status: DISCONTINUED | OUTPATIENT
Start: 2022-06-09 | End: 2022-06-14

## 2022-06-09 RX ORDER — CHLORPROMAZINE HCL 10 MG
25 TABLET ORAL EVERY 8 HOURS
Refills: 0 | Status: DISCONTINUED | OUTPATIENT
Start: 2022-06-09 | End: 2022-06-14

## 2022-06-09 RX ORDER — MORPHINE SULFATE 50 MG/1
4 CAPSULE, EXTENDED RELEASE ORAL ONCE
Refills: 0 | Status: DISCONTINUED | OUTPATIENT
Start: 2022-06-09 | End: 2022-06-09

## 2022-06-09 RX ORDER — LIPASE/PROTEASE/AMYLASE 16-48-48K
2 CAPSULE,DELAYED RELEASE (ENTERIC COATED) ORAL
Refills: 0 | Status: DISCONTINUED | OUTPATIENT
Start: 2022-06-09 | End: 2022-06-14

## 2022-06-09 RX ORDER — FOLIC ACID 0.8 MG
1 TABLET ORAL DAILY
Refills: 0 | Status: DISCONTINUED | OUTPATIENT
Start: 2022-06-09 | End: 2022-06-14

## 2022-06-09 RX ORDER — OXYCODONE HYDROCHLORIDE 5 MG/1
10 TABLET ORAL EVERY 4 HOURS
Refills: 0 | Status: DISCONTINUED | OUTPATIENT
Start: 2022-06-09 | End: 2022-06-14

## 2022-06-09 RX ORDER — FOLIC ACID 0.8 MG
1 TABLET ORAL
Qty: 0 | Refills: 0 | DISCHARGE

## 2022-06-09 RX ORDER — OXYCODONE AND ACETAMINOPHEN 5; 325 MG/1; MG/1
2 TABLET ORAL EVERY 4 HOURS
Refills: 0 | Status: DISCONTINUED | OUTPATIENT
Start: 2022-06-09 | End: 2022-06-09

## 2022-06-09 RX ORDER — ACETAMINOPHEN 500 MG
650 TABLET ORAL EVERY 6 HOURS
Refills: 0 | Status: DISCONTINUED | OUTPATIENT
Start: 2022-06-09 | End: 2022-06-14

## 2022-06-09 RX ORDER — CYCLOBENZAPRINE HYDROCHLORIDE 10 MG/1
5 TABLET, FILM COATED ORAL THREE TIMES A DAY
Refills: 0 | Status: DISCONTINUED | OUTPATIENT
Start: 2022-06-09 | End: 2022-06-13

## 2022-06-09 RX ORDER — SENNA PLUS 8.6 MG/1
2 TABLET ORAL AT BEDTIME
Refills: 0 | Status: DISCONTINUED | OUTPATIENT
Start: 2022-06-09 | End: 2022-06-14

## 2022-06-09 RX ORDER — PANTOPRAZOLE SODIUM 20 MG/1
40 TABLET, DELAYED RELEASE ORAL
Refills: 0 | Status: DISCONTINUED | OUTPATIENT
Start: 2022-06-09 | End: 2022-06-14

## 2022-06-09 RX ORDER — HEPARIN SODIUM 5000 [USP'U]/ML
5000 INJECTION INTRAVENOUS; SUBCUTANEOUS EVERY 8 HOURS
Refills: 0 | Status: DISCONTINUED | OUTPATIENT
Start: 2022-06-09 | End: 2022-06-14

## 2022-06-09 RX ADMIN — MORPHINE SULFATE 4 MILLIGRAM(S): 50 CAPSULE, EXTENDED RELEASE ORAL at 20:50

## 2022-06-09 RX ADMIN — MORPHINE SULFATE 4 MILLIGRAM(S): 50 CAPSULE, EXTENDED RELEASE ORAL at 17:01

## 2022-06-09 RX ADMIN — OXYCODONE HYDROCHLORIDE 10 MILLIGRAM(S): 5 TABLET ORAL at 22:47

## 2022-06-09 RX ADMIN — MORPHINE SULFATE 4 MILLIGRAM(S): 50 CAPSULE, EXTENDED RELEASE ORAL at 17:44

## 2022-06-09 RX ADMIN — OXYCODONE HYDROCHLORIDE 10 MILLIGRAM(S): 5 TABLET ORAL at 23:47

## 2022-06-09 RX ADMIN — CYCLOBENZAPRINE HYDROCHLORIDE 5 MILLIGRAM(S): 10 TABLET, FILM COATED ORAL at 22:47

## 2022-06-09 RX ADMIN — SODIUM CHLORIDE 1000 MILLILITER(S): 9 INJECTION INTRAMUSCULAR; INTRAVENOUS; SUBCUTANEOUS at 18:23

## 2022-06-09 NOTE — H&P ADULT - ASSESSMENT
61M with PMHx of chronic pancreatitis (complicated by chronic pain of which secondary to alcohol abuse), CKD4, and esophageal strictures (post-stent) presenting for acute-on-chronic epigastric pain for several days. This is in the setting of running out of Percocet. CT A&P showing diffuse pancreatic calcifications with stable dilated pancreatic duct measuring 1.4 cm. Suspect patient's pain is due to chronic pancreatitis with no new process.

## 2022-06-09 NOTE — CHART NOTE - NSCHARTNOTEFT_GEN_A_CORE
Reference #: [id number]    Rx Written	Rx Dispensed	Drug	Quantity	Days Supply	Prescriber Name	Prescriber Ana #	Payment Method	Dispenser  05/18/2022	05/25/2022	oxycodone-acetaminophen  mg tab	112	19	Lamberto Hurtado	[id number]	Medicaid	[id number]  05/11/2022	05/11/2022	oxycodone-acetaminophen 5-325 mg tab	112	14	Lamberto Hurtado	[id number]	Medicaid	[id number]  05/10/2022	05/10/2022	oxycodone hcl (ir) 10 mg tab	28	7	[facility]	[id number]	Medicaid	[facility]  04/23/2022	04/25/2022	oxycodone hcl (ir) 10 mg tab	42	7	[facility]	[id number]	[facility]  03/25/2022	04/01/2022	oxycodone-acetaminophen 5-325 mg tab	112	14	Lamberto Hurtado	[id number]	Medicaid	[id number]

## 2022-06-09 NOTE — H&P ADULT - PROBLEM SELECTOR PLAN 1
Patient with known pancreatic duct retained stone and unable to perform ERCP. Pain could be from this or chronic pancreatitis with poor pain management as outpatient.    Patient prescribed Percocet  q4 hours PRN severe pain as outpatient, which we do not have so will provide Oxycodone 10 mg q4 hours PRN severe pain and determine how much he requires  Patient with pending outpatient chronic pain assessment  Has outpatient GI follow up with Cirilo Edwards  Continue with Creon   Diabetic diet

## 2022-06-09 NOTE — H&P ADULT - NSHPREVIEWOFSYSTEMS_GEN_ALL_CORE
GEN: no night sweats or change in appetite  EYES: no changes in vision or diplopia   ENT: no epistaxis, sinus pain, gingival bleeding, odynophagia or dysphagia  CV: no CP, PND or palpitations  RESP: no cough, wheezing, or hemoptysis  GI: no hematemesis, hematochezia, or melena  : no dysuria, polyuria, or hematuria  MSK: no arthralgias or joint swelling   NEURO: no gross sensory changes, numbness, focal deficits  PSYCH: no depression or changes in concentration  HEME/ONC: no purpura, petechiae or night sweats  SKIN: no pruritus, hair loss or skin lesions  ALL: no photosensitivity, no complaints of anaphylaxis (SOB, throat swelling) GEN: no night sweats or change in appetite  EYES: no changes in vision or diplopia   ENT: no epistaxis, sinus pain, gingival bleeding, odynophagia or dysphagia  CV: no CP, PND or palpitations  RESP: no cough, wheezing, or hemoptysis  GI: no hematemesis, hematochezia, or melena; +epigastric pain.  : no dysuria, polyuria, or hematuria  MSK: no arthralgias or joint swelling   NEURO: no gross sensory changes, numbness, focal deficits  PSYCH: no depression or changes in concentration  HEME/ONC: no purpura, petechiae or night sweats  SKIN: no pruritus, hair loss or skin lesions  ALL: no photosensitivity, no complaints of anaphylaxis (SOB, throat swelling)

## 2022-06-09 NOTE — ED PROVIDER NOTE - CLINICAL SUMMARY MEDICAL DECISION MAKING FREE TEXT BOX
61y Male with PMHx of chronic pancreatitis w/stent on percocet daily presents to the ER for abdominal pain. Reports epigastric abdominal pain, not controlled with percocet, unable to discuss timeline. Poor historian, unable to provide details. Denies fever, chills, chest pain, SOB, n/v/d, urinary complaints. Vital signs stable, upper abdominal/epigastric pain. Will get labs, meds, CT, reassess. Likely admit.

## 2022-06-09 NOTE — ED PROVIDER NOTE - OBJECTIVE STATEMENT
61y Male with PMHx of chronic pancreatitis w/stent on percocet daily presents to the ER for abdominal pain. Patient reports epigastric abdominal pain, not controlled with percocet, unable to discuss timeline. Took no medications today, 1 percocet yesterday and unsure how many the day before. Reports taking 6-8 percocet a day for pain without relief of pain. Patient poor historian, unable to provide details. Denies fever, chills, chest pain, SOB, n/v/d, urinary complaints.

## 2022-06-09 NOTE — PATIENT PROFILE ADULT - FALL HARM RISK - HARM RISK INTERVENTIONS

## 2022-06-09 NOTE — ED ADULT TRIAGE NOTE - CHIEF COMPLAINT QUOTE
pt c/o biba epigastric pain, states he ran out of percocet cause he took them all for pain, and does not see his doctor till the 15th and wants more.

## 2022-06-09 NOTE — ED PROVIDER NOTE - NSICDXPASTMEDICALHX_GEN_ALL_CORE_FT
PAST MEDICAL HISTORY:  Alcohol abuse     Chronic kidney disease (CKD)     ETOH abuse sober x1 year approximately    Gout     Gout     H/O chronic pancreatitis     Hepatitis C treated    HTN (hypertension)     Pancreatitis

## 2022-06-09 NOTE — ED PROVIDER NOTE - PROGRESS NOTE DETAILS
ct with no acute findigns. patient pain uncontrollable, will require admission for itnractable abdominal pain

## 2022-06-09 NOTE — ED ADULT NURSE NOTE - ED STAT RN HANDOFF DETAILS
Report received from [facility]. Safety checks completed this shift. Safety rounds completed hourly.  IV sites checked Q2+remains WDL.  Fall & skin precautions in place. No acute distress noted.

## 2022-06-09 NOTE — ED PROVIDER NOTE - ATTENDING APP SHARED VISIT CONTRIBUTION OF CARE
Patient evaluated and seen with ROB Campbell agree with above history and physical - pt examined and seen by me personally - findings as seen: Pt with hx of chronic pancreatitis otherwise with percocet used for treatment - pt took over percocet 12 percocet per day, and had run out of his meds early though script was for 1 tab every 6 hours and otherwise with continued persistent epigastric pain. Noted metabolic alkylosis otherwise CT performed due to abd pain - pt signed out to Dr Singh pending study.

## 2022-06-09 NOTE — ED PROVIDER NOTE - NSICDXFAMILYHX_GEN_ALL_CORE_FT
FAMILY HISTORY:  FH: HTN (hypertension)    Father  Still living? Unknown  FH: hypertension, Age at diagnosis: Age Unknown

## 2022-06-09 NOTE — H&P ADULT - HISTORY OF PRESENT ILLNESS
61y Male with PMHx of chronic pancreatitis w/stent on percocet daily presents to the ER for abdominal pain. Patient reports epigastric abdominal pain, not controlled with percocet, unable to discuss timeline. Took no medications today, 1 percocet yesterday and unsure how many the day before. Reports taking 6-8 percocet a day for pain without relief of pain. Patient poor historian, unable to provide details. Denies fever, chills, chest pain, SOB, n/v/d, urinary complaints.    The patient was recently admitted to [facility] for hematemesis, underwent EGD which showed esophageal stricture s/p CRE balloon dilation, migration of esophageal stent s/p stent relocation over stricture with suturing to keep stent in place.     PANCREAS: Diffuse pancreatic calcifications. Stable dilated pancreatic   duct measuring up to 1.4 cm. Patient well known to me from [facility].    61M with PMHx of chronic pancreatitis (complicated by chronic pain of which secondary to alcohol abuse), CKD4, and esophageal strictures (post-stent) presenting for acute-on-chronic epigastric pain for several days. Patient recently admitted to [facility] twice for hematemesis (with stent migration requiring balloon dilation of stricture and suturing of stent) and epigastric pain with admission for pain control with known pancreatic duct stone, but unable to perform ERCP due to stricture. After he was discharged on May 10 he saw his PMD approximately one week prior who agreed to increase his Percocet  to q4 hours PRN due to poorly controlled pain. He has pending outpatient GI and pain management follow ups. He states he has been taking about 8 Percocets per day due to poorly controlled pain. He states it is constant, severe and located in epigastric region. Denies diarrhea, constipation, nausea, vomiting or decrease PO intake. He came in to get more pain medications. CT A&P showing diffuse pancreatic calcifications with stable dilated pancreatic duct measuring 1.4 cm. Patient given 1 L NS and morphine 4 mg IV. He denies taking any other medications for pain. Denies any recent alcohol use.

## 2022-06-09 NOTE — H&P ADULT - NSHPPHYSICALEXAM_GEN_ALL_CORE
T(C): 36.6 (06-09-22 @ 19:29), Max: 36.9 (06-09-22 @ 14:16)  HR: 87 (06-09-22 @ 19:29) (81 - 99)  BP: 103/66 (06-09-22 @ 19:29) (99/65 - 118/76)  RR: 16 (06-09-22 @ 19:29) (16 - 20)  SpO2: 100% (06-09-22 @ 19:29) (98% - 100%)    GEN: (fe)male in NAD, appears comfortable, no diaphoresis  EYES: No scleral injection, PERRL, EOMI  ENTM: neck supple & symmetric without tracheal deviation, moist membranes, no gross hearing impairment, thyroid gland not enlarged  CV: +S1/S2, no m/r/g, no abdominal bruit, no LE edema  RESP: breathing comfortably, no respiratory accessory muscle use, CTAB, no w/r/r  GI: normoactive BS, soft, NTND, no rebounding/guarding, no palpable masses  LYMPHATICS: no LAD or tenderness to palpation  NEURO: AOx3, no focal deficits, CNII-XII grossly intact  PSYCH: No SI/HI/AVH, appropriate affect, appropriate insight/judgment   SKIN: no petechiae, ecchymosis or maculopapular rash noted T(C): 36.6 (06-09-22 @ 19:29), Max: 36.9 (06-09-22 @ 14:16)  HR: 87 (06-09-22 @ 19:29) (81 - 99)  BP: 103/66 (06-09-22 @ 19:29) (99/65 - 118/76)  RR: 16 (06-09-22 @ 19:29) (16 - 20)  SpO2: 100% (06-09-22 @ 19:29) (98% - 100%)    GEN: male in NAD, appears comfortable, no diaphoresis  EYES: No scleral injection, PERRL, EOMI  ENTM: neck supple & symmetric without tracheal deviation, moist membranes, no gross hearing impairment, thyroid gland not enlarged  CV: +S1/S2, no m/r/g, no abdominal bruit, no LE edema  RESP: breathing comfortably, no respiratory accessory muscle use, CTAB, no w/r/r  GI: hypoactive BS, soft, ND, no rebounding/guarding, no palpable masses, +tender to palpation in epigastric region  LYMPHATICS: no LAD or tenderness to palpation  NEURO: AOx3, no focal deficits, CNII-XII grossly intact  PSYCH: No SI/HI/AVH, appropriate affect, poor insight/judgment   SKIN: no petechiae, ecchymosis or maculopapular rash noted

## 2022-06-09 NOTE — H&P ADULT - PROBLEM SELECTOR PLAN 2
Per formula patient with both respiratory and metabolic alkalosis. Denies diarrhea or vomiting or medications which would cause alkalosis such as diuretics. Percocet would actually cause an acidosis?    Suspect this will be chloride responsive so start NS at 90 cc/hr  If does not improve would consult nephrology  Send VBG tomorrow, monitor lytes

## 2022-06-09 NOTE — ED PROVIDER NOTE - NS ED ROS FT
Constitutional: (-) Fever, (-) Chills  Skin: (-) Rashes  Eyes: (-) Visual changes, (-) Discharge, (-) Redness  Ears: (-) Hearing loss, (-)Tinnitus, (-) Ear pain  Nose: (-) Nasal congestion, (-) Runny nose  Mouth/Throat: (-) Sore throat  CV: (-) Chest pain  Resp: (-) Cough, (-) Shortness of breath, (-) Dyspnea on Exertion, (-) Wheezing  GI: (+) Abdominal pain, (-) Nausea, (-) Vomiting, (-) Diarrhea  : (-) Dysuria   MSK: (-) Myalgias  Neuro: (-) Headache

## 2022-06-09 NOTE — ED ADULT NURSE REASSESSMENT NOTE - NS ED NURSE REASSESS COMMENT FT1
Patient reports Abdominal pain worsening, Moaning the word pain. Patient reports pain since yesterday. 'I ran out of Percocet. I need more. " Patient prescribed 112 pills filled  on 5/22/22. patient has been taking 2 tabs every 4 hours, even though prescribed 1 tab every 6 hours  as needed. Patient reports Abdominal pain worsening, Moaning the word pain. Patient reports pain since yesterday. 'I ran out of Percocet. I need more. " Patient prescribed 112 pills filled  on 5/22/22. patient has been taking 2 tabs every 4 hours, even though prescribed 1 tab every 6 hours  as needed. Patient reports ran out of pills 2 days ago, used 112 pills in 13 days.

## 2022-06-10 LAB
ALBUMIN SERPL ELPH-MCNC: 2.4 G/DL — LOW (ref 3.3–5)
ALP SERPL-CCNC: 74 U/L — SIGNIFICANT CHANGE UP (ref 40–120)
ALT FLD-CCNC: 21 U/L — SIGNIFICANT CHANGE UP (ref 12–78)
ANION GAP SERPL CALC-SCNC: 8 MMOL/L — SIGNIFICANT CHANGE UP (ref 5–17)
AST SERPL-CCNC: 13 U/L — LOW (ref 15–37)
BASE EXCESS BLDV CALC-SCNC: 22.4 MMOL/L — HIGH (ref -2–3)
BILIRUB SERPL-MCNC: 0.2 MG/DL — SIGNIFICANT CHANGE UP (ref 0.2–1.2)
BLOOD GAS COMMENTS, VENOUS: SIGNIFICANT CHANGE UP
BUN SERPL-MCNC: 46 MG/DL — HIGH (ref 7–23)
CALCIUM SERPL-MCNC: 8.5 MG/DL — SIGNIFICANT CHANGE UP (ref 8.5–10.1)
CHLORIDE BLDV-SCNC: 93 MMOL/L — LOW (ref 98–107)
CHLORIDE SERPL-SCNC: 93 MMOL/L — LOW (ref 96–108)
CO2 BLDV-SCNC: 50 MMOL/L — HIGH (ref 22–26)
CO2 SERPL-SCNC: 42 MMOL/L — HIGH (ref 22–31)
CREAT SERPL-MCNC: 2.64 MG/DL — HIGH (ref 0.5–1.3)
EGFR: 27 ML/MIN/1.73M2 — LOW
GAS PNL BLDV: 142 MMOL/L — SIGNIFICANT CHANGE UP (ref 136–145)
GAS PNL BLDV: SIGNIFICANT CHANGE UP
GAS PNL BLDV: SIGNIFICANT CHANGE UP
GLUCOSE BLDV-MCNC: 203 MG/DL — HIGH (ref 65–95)
GLUCOSE SERPL-MCNC: 205 MG/DL — HIGH (ref 70–99)
HCO3 BLDV-SCNC: 48 MMOL/L — CRITICAL HIGH (ref 22–28)
HCT VFR BLD CALC: 32 % — LOW (ref 39–50)
HCT VFR BLDA CALC: 30 % — LOW (ref 37–47)
HGB BLD CALC-MCNC: 10 G/DL — LOW (ref 12.6–17.4)
HGB BLD-MCNC: 9.7 G/DL — LOW (ref 13–17)
HOROWITZ INDEX BLDV+IHG-RTO: 21 — SIGNIFICANT CHANGE UP
LACTATE BLDV-MCNC: 3.2 MMOL/L — HIGH (ref 0.56–1.39)
MAGNESIUM SERPL-MCNC: 2.2 MG/DL — SIGNIFICANT CHANGE UP (ref 1.6–2.6)
MCHC RBC-ENTMCNC: 30 PG — SIGNIFICANT CHANGE UP (ref 27–34)
MCHC RBC-ENTMCNC: 30.3 G/DL — LOW (ref 32–36)
MCV RBC AUTO: 99.1 FL — SIGNIFICANT CHANGE UP (ref 80–100)
NRBC # BLD: 0 /100 WBCS — SIGNIFICANT CHANGE UP (ref 0–0)
PCO2 BLDV: 59 MMHG — HIGH (ref 42–55)
PH BLDV: 7.52 — HIGH (ref 7.32–7.43)
PHOSPHATE SERPL-MCNC: 2 MG/DL — LOW (ref 2.5–4.5)
PLATELET # BLD AUTO: 260 K/UL — SIGNIFICANT CHANGE UP (ref 150–400)
PO2 BLDV: 94 MMHG — HIGH (ref 25–45)
POTASSIUM BLDV-SCNC: 2.7 MMOL/L — CRITICAL LOW (ref 3.5–5.1)
POTASSIUM SERPL-MCNC: 2.8 MMOL/L — CRITICAL LOW (ref 3.5–5.3)
POTASSIUM SERPL-SCNC: 2.8 MMOL/L — CRITICAL LOW (ref 3.5–5.3)
PROT SERPL-MCNC: 5.6 GM/DL — LOW (ref 6–8.3)
RBC # BLD: 3.23 M/UL — LOW (ref 4.2–5.8)
RBC # FLD: 17.5 % — HIGH (ref 10.3–14.5)
SAO2 % BLDV: 97.9 % — SIGNIFICANT CHANGE UP (ref 94–98)
SODIUM SERPL-SCNC: 143 MMOL/L — SIGNIFICANT CHANGE UP (ref 135–145)
WBC # BLD: 10.39 K/UL — SIGNIFICANT CHANGE UP (ref 3.8–10.5)
WBC # FLD AUTO: 10.39 K/UL — SIGNIFICANT CHANGE UP (ref 3.8–10.5)

## 2022-06-10 PROCEDURE — 99233 SBSQ HOSP IP/OBS HIGH 50: CPT

## 2022-06-10 RX ORDER — POTASSIUM CHLORIDE 20 MEQ
10 PACKET (EA) ORAL
Refills: 0 | Status: COMPLETED | OUTPATIENT
Start: 2022-06-10 | End: 2022-06-10

## 2022-06-10 RX ADMIN — Medication 25 MILLIGRAM(S): at 15:30

## 2022-06-10 RX ADMIN — SODIUM CHLORIDE 90 MILLILITER(S): 9 INJECTION INTRAMUSCULAR; INTRAVENOUS; SUBCUTANEOUS at 00:11

## 2022-06-10 RX ADMIN — Medication 25 MILLIGRAM(S): at 07:00

## 2022-06-10 RX ADMIN — Medication 100 MILLIGRAM(S): at 11:57

## 2022-06-10 RX ADMIN — Medication 100 MILLIEQUIVALENT(S): at 11:57

## 2022-06-10 RX ADMIN — OXYCODONE HYDROCHLORIDE 10 MILLIGRAM(S): 5 TABLET ORAL at 07:59

## 2022-06-10 RX ADMIN — Medication 2 CAPSULE(S): at 17:19

## 2022-06-10 RX ADMIN — OXYCODONE HYDROCHLORIDE 10 MILLIGRAM(S): 5 TABLET ORAL at 06:59

## 2022-06-10 RX ADMIN — HEPARIN SODIUM 5000 UNIT(S): 5000 INJECTION INTRAVENOUS; SUBCUTANEOUS at 21:41

## 2022-06-10 RX ADMIN — OXYCODONE HYDROCHLORIDE 10 MILLIGRAM(S): 5 TABLET ORAL at 11:06

## 2022-06-10 RX ADMIN — Medication 100 MILLIEQUIVALENT(S): at 09:04

## 2022-06-10 RX ADMIN — OXYCODONE HYDROCHLORIDE 10 MILLIGRAM(S): 5 TABLET ORAL at 15:38

## 2022-06-10 RX ADMIN — OXYCODONE HYDROCHLORIDE 10 MILLIGRAM(S): 5 TABLET ORAL at 15:53

## 2022-06-10 RX ADMIN — OXYCODONE HYDROCHLORIDE 10 MILLIGRAM(S): 5 TABLET ORAL at 03:03

## 2022-06-10 RX ADMIN — PANTOPRAZOLE SODIUM 40 MILLIGRAM(S): 20 TABLET, DELAYED RELEASE ORAL at 07:00

## 2022-06-10 RX ADMIN — OXYCODONE HYDROCHLORIDE 10 MILLIGRAM(S): 5 TABLET ORAL at 12:06

## 2022-06-10 RX ADMIN — HEPARIN SODIUM 5000 UNIT(S): 5000 INJECTION INTRAVENOUS; SUBCUTANEOUS at 15:37

## 2022-06-10 RX ADMIN — OXYCODONE HYDROCHLORIDE 10 MILLIGRAM(S): 5 TABLET ORAL at 21:31

## 2022-06-10 RX ADMIN — Medication 2 CAPSULE(S): at 09:04

## 2022-06-10 RX ADMIN — Medication 2 CAPSULE(S): at 11:57

## 2022-06-10 RX ADMIN — OXYCODONE HYDROCHLORIDE 10 MILLIGRAM(S): 5 TABLET ORAL at 20:31

## 2022-06-10 RX ADMIN — Medication 1 MILLIGRAM(S): at 11:57

## 2022-06-10 RX ADMIN — OXYCODONE HYDROCHLORIDE 10 MILLIGRAM(S): 5 TABLET ORAL at 04:03

## 2022-06-11 LAB
ALBUMIN SERPL ELPH-MCNC: 2.3 G/DL — LOW (ref 3.3–5)
ALP SERPL-CCNC: 67 U/L — SIGNIFICANT CHANGE UP (ref 40–120)
ALT FLD-CCNC: 17 U/L — SIGNIFICANT CHANGE UP (ref 12–78)
ANION GAP SERPL CALC-SCNC: 4 MMOL/L — LOW (ref 5–17)
APPEARANCE UR: CLEAR — SIGNIFICANT CHANGE UP
AST SERPL-CCNC: 16 U/L — SIGNIFICANT CHANGE UP (ref 15–37)
BACTERIA # UR AUTO: ABNORMAL
BILIRUB SERPL-MCNC: 0.1 MG/DL — LOW (ref 0.2–1.2)
BILIRUB UR-MCNC: NEGATIVE — SIGNIFICANT CHANGE UP
BUN SERPL-MCNC: 44 MG/DL — HIGH (ref 7–23)
CALCIUM SERPL-MCNC: 8.4 MG/DL — LOW (ref 8.5–10.1)
CHLORIDE SERPL-SCNC: 100 MMOL/L — SIGNIFICANT CHANGE UP (ref 96–108)
CO2 SERPL-SCNC: 36 MMOL/L — HIGH (ref 22–31)
COLOR SPEC: YELLOW — SIGNIFICANT CHANGE UP
CREAT SERPL-MCNC: 2.48 MG/DL — HIGH (ref 0.5–1.3)
DIFF PNL FLD: NEGATIVE — SIGNIFICANT CHANGE UP
EGFR: 29 ML/MIN/1.73M2 — LOW
EPI CELLS # UR: SIGNIFICANT CHANGE UP
GLUCOSE SERPL-MCNC: 80 MG/DL — SIGNIFICANT CHANGE UP (ref 70–99)
GLUCOSE UR QL: NEGATIVE MG/DL — SIGNIFICANT CHANGE UP
HCT VFR BLD CALC: 28.3 % — LOW (ref 39–50)
HGB BLD-MCNC: 8.8 G/DL — LOW (ref 13–17)
KETONES UR-MCNC: NEGATIVE — SIGNIFICANT CHANGE UP
LEUKOCYTE ESTERASE UR-ACNC: NEGATIVE — SIGNIFICANT CHANGE UP
LIDOCAIN IGE QN: 82 U/L — SIGNIFICANT CHANGE UP (ref 73–393)
MCHC RBC-ENTMCNC: 30.8 PG — SIGNIFICANT CHANGE UP (ref 27–34)
MCHC RBC-ENTMCNC: 31.1 G/DL — LOW (ref 32–36)
MCV RBC AUTO: 99 FL — SIGNIFICANT CHANGE UP (ref 80–100)
NITRITE UR-MCNC: NEGATIVE — SIGNIFICANT CHANGE UP
NRBC # BLD: 0 /100 WBCS — SIGNIFICANT CHANGE UP (ref 0–0)
PH UR: 9 — HIGH (ref 5–8)
PHOSPHATE SERPL-MCNC: 2 MG/DL — LOW (ref 2.5–4.5)
PLATELET # BLD AUTO: 236 K/UL — SIGNIFICANT CHANGE UP (ref 150–400)
POTASSIUM SERPL-MCNC: 3.3 MMOL/L — LOW (ref 3.5–5.3)
POTASSIUM SERPL-SCNC: 3.3 MMOL/L — LOW (ref 3.5–5.3)
PROT SERPL-MCNC: 5.6 GM/DL — LOW (ref 6–8.3)
PROT UR-MCNC: 30 MG/DL
RBC # BLD: 2.86 M/UL — LOW (ref 4.2–5.8)
RBC # FLD: 17.3 % — HIGH (ref 10.3–14.5)
RBC CASTS # UR COMP ASSIST: SIGNIFICANT CHANGE UP /HPF (ref 0–4)
SODIUM SERPL-SCNC: 140 MMOL/L — SIGNIFICANT CHANGE UP (ref 135–145)
SP GR SPEC: 1.01 — SIGNIFICANT CHANGE UP (ref 1.01–1.02)
UROBILINOGEN FLD QL: NEGATIVE MG/DL — SIGNIFICANT CHANGE UP
WBC # BLD: 9.29 K/UL — SIGNIFICANT CHANGE UP (ref 3.8–10.5)
WBC # FLD AUTO: 9.29 K/UL — SIGNIFICANT CHANGE UP (ref 3.8–10.5)
WBC UR QL: SIGNIFICANT CHANGE UP

## 2022-06-11 PROCEDURE — 99232 SBSQ HOSP IP/OBS MODERATE 35: CPT

## 2022-06-11 RX ORDER — SODIUM,POTASSIUM PHOSPHATES 278-250MG
1 POWDER IN PACKET (EA) ORAL EVERY 4 HOURS
Refills: 0 | Status: COMPLETED | OUTPATIENT
Start: 2022-06-11 | End: 2022-06-11

## 2022-06-11 RX ORDER — SODIUM,POTASSIUM PHOSPHATES 278-250MG
1 POWDER IN PACKET (EA) ORAL EVERY 4 HOURS
Refills: 0 | Status: DISCONTINUED | OUTPATIENT
Start: 2022-06-11 | End: 2022-06-11

## 2022-06-11 RX ORDER — POTASSIUM PHOSPHATE, MONOBASIC POTASSIUM PHOSPHATE, DIBASIC 236; 224 MG/ML; MG/ML
30 INJECTION, SOLUTION INTRAVENOUS ONCE
Refills: 0 | Status: COMPLETED | OUTPATIENT
Start: 2022-06-11 | End: 2022-06-11

## 2022-06-11 RX ORDER — POTASSIUM CHLORIDE 20 MEQ
40 PACKET (EA) ORAL
Refills: 0 | Status: COMPLETED | OUTPATIENT
Start: 2022-06-11 | End: 2022-06-11

## 2022-06-11 RX ADMIN — OXYCODONE HYDROCHLORIDE 10 MILLIGRAM(S): 5 TABLET ORAL at 01:32

## 2022-06-11 RX ADMIN — HEPARIN SODIUM 5000 UNIT(S): 5000 INJECTION INTRAVENOUS; SUBCUTANEOUS at 05:02

## 2022-06-11 RX ADMIN — OXYCODONE HYDROCHLORIDE 10 MILLIGRAM(S): 5 TABLET ORAL at 09:34

## 2022-06-11 RX ADMIN — Medication 100 MILLIGRAM(S): at 11:46

## 2022-06-11 RX ADMIN — HEPARIN SODIUM 5000 UNIT(S): 5000 INJECTION INTRAVENOUS; SUBCUTANEOUS at 13:42

## 2022-06-11 RX ADMIN — HEPARIN SODIUM 5000 UNIT(S): 5000 INJECTION INTRAVENOUS; SUBCUTANEOUS at 22:31

## 2022-06-11 RX ADMIN — Medication 40 MILLIEQUIVALENT(S): at 13:42

## 2022-06-11 RX ADMIN — OXYCODONE HYDROCHLORIDE 10 MILLIGRAM(S): 5 TABLET ORAL at 21:05

## 2022-06-11 RX ADMIN — PANTOPRAZOLE SODIUM 40 MILLIGRAM(S): 20 TABLET, DELAYED RELEASE ORAL at 07:40

## 2022-06-11 RX ADMIN — Medication 25 MILLIGRAM(S): at 20:08

## 2022-06-11 RX ADMIN — OXYCODONE HYDROCHLORIDE 10 MILLIGRAM(S): 5 TABLET ORAL at 06:03

## 2022-06-11 RX ADMIN — OXYCODONE HYDROCHLORIDE 10 MILLIGRAM(S): 5 TABLET ORAL at 14:44

## 2022-06-11 RX ADMIN — Medication 2 CAPSULE(S): at 11:46

## 2022-06-11 RX ADMIN — Medication 2 CAPSULE(S): at 17:19

## 2022-06-11 RX ADMIN — OXYCODONE HYDROCHLORIDE 10 MILLIGRAM(S): 5 TABLET ORAL at 00:32

## 2022-06-11 RX ADMIN — OXYCODONE HYDROCHLORIDE 10 MILLIGRAM(S): 5 TABLET ORAL at 20:08

## 2022-06-11 RX ADMIN — Medication 25 MILLIGRAM(S): at 01:16

## 2022-06-11 RX ADMIN — Medication 40 MILLIEQUIVALENT(S): at 11:45

## 2022-06-11 RX ADMIN — Medication 1 PACKET(S): at 22:31

## 2022-06-11 RX ADMIN — CYCLOBENZAPRINE HYDROCHLORIDE 5 MILLIGRAM(S): 10 TABLET, FILM COATED ORAL at 20:08

## 2022-06-11 RX ADMIN — OXYCODONE HYDROCHLORIDE 10 MILLIGRAM(S): 5 TABLET ORAL at 10:16

## 2022-06-11 RX ADMIN — Medication 1 PACKET(S): at 18:39

## 2022-06-11 RX ADMIN — OXYCODONE HYDROCHLORIDE 10 MILLIGRAM(S): 5 TABLET ORAL at 05:03

## 2022-06-11 RX ADMIN — Medication 25 MILLIGRAM(S): at 11:46

## 2022-06-11 RX ADMIN — OXYCODONE HYDROCHLORIDE 10 MILLIGRAM(S): 5 TABLET ORAL at 15:50

## 2022-06-11 RX ADMIN — Medication 2 CAPSULE(S): at 07:40

## 2022-06-11 RX ADMIN — CYCLOBENZAPRINE HYDROCHLORIDE 5 MILLIGRAM(S): 10 TABLET, FILM COATED ORAL at 12:05

## 2022-06-11 NOTE — DIETITIAN INITIAL EVALUATION ADULT - PERTINENT LABORATORY DATA
06-11    140  |  100  |  44<H>  ----------------------------<  80  3.3<L>   |  36<H>  |  2.48<H>    Ca    8.4<L>      11 Jun 2022 07:45  Phos  2.0     06-11  Mg     2.2     06-10    TPro  5.6<L>  /  Alb  2.3<L>  /  TBili  0.1<L>  /  DBili  x   /  AST  16  /  ALT  17  /  AlkPhos  67  06-11  A1C with Estimated Average Glucose Result: 5.5 % (04-29-22 @ 05:36)  A1C with Estimated Average Glucose Result: 5.5 % (02-09-22 @ 17:47)

## 2022-06-11 NOTE — DIETITIAN INITIAL EVALUATION ADULT - REASON INDICATOR FOR ASSESSMENT
BMI<19,  staff requested to see pt due to request for excessive/unreasonable of food up to 7 servings of each food item.

## 2022-06-11 NOTE — DIETITIAN NUTRITION RISK NOTIFICATION - TREATMENT: THE FOLLOWING DIET HAS BEEN RECOMMENDED
Diet, Regular:   Low Sodium  Supplement Feeding Modality:  Oral  Ensure Clear Cans or Servings Per Day:  1       Frequency:  Two Times a day (06-11-22 @ 14:33) [Pending Verification By Attending]  Diet, Regular (06-10-22 @ 17:54) [Active]

## 2022-06-11 NOTE — DIETITIAN INITIAL EVALUATION ADULT - OTHER INFO
Pt denied alcohol use PTA.  Problem diagnosis include chronic pancreatitis, metabolic acidosis, respiratory alkalosis, esophageal stricture, normocytic anemia.

## 2022-06-11 NOTE — DIETITIAN INITIAL EVALUATION ADULT - ORAL INTAKE PTA/DIET HISTORY
Pt c c/o no getting enough food, appeared to have discomfort, c/o abdominal pain.  Pt appeared to be an unreliable historian.  Pt reported wt. loss since January 2022.  Pt noted to have excess food in room, food tray his bed & on his serving table.   Pt lived c mother, stated that he did the shopping/cooking as his mother can not do much.  Pt denied food insecurity, stated that he believes that he gets SNAP/food stamp privileges.   Previous adm RD notes noted, hx of hoarding, homelessness noted.  RD provided simple verbal education for CKD nutrition therapy & pancreatitis, pt was somewhat receptive to education and is willing to cut back portions & requested night time snack.   Pt would like nutritional supplement as well.

## 2022-06-11 NOTE — DIETITIAN INITIAL EVALUATION ADULT - FACTORS AFF FOOD INTAKE
pt is eating despite c/o pain.  Pt appeared to have some missing teeth, denied chewing/swallowing difficulty, although is cutting up food in small pieces himself in multiple bowls when seen.

## 2022-06-11 NOTE — DIETITIAN INITIAL EVALUATION ADULT - ADD RECOMMEND
monitor for altered swallow monitor for altered swallow  Social Service consult to assess social circumstances, pt's ability to manage own care

## 2022-06-11 NOTE — DIETITIAN INITIAL EVALUATION ADULT - OTHER CALCULATIONS
Wt.06/09, 53.1 kg    IBW: 83.4 kg       %IBW: 61%    UBW: 99 kg        % UBW: 54%  06/09, 53.1 kg(pt information wt.), current wt. not available.

## 2022-06-11 NOTE — DIETITIAN INITIAL EVALUATION ADULT - ETIOLOGY
inadequate protein-energy intake in setting of hx of alcohol misuse, CKD, chronic pancreatitis, Hepatitis C, social/ environmental circumstances

## 2022-06-11 NOTE — DIETITIAN INITIAL EVALUATION ADULT - PERTINENT MEDS FT
MEDICATIONS  (STANDING):  bisacodyl 5 milliGRAM(s) Oral at bedtime  folic acid 1 milliGRAM(s) Oral daily  heparin   Injectable 5000 Unit(s) SubCutaneous every 8 hours  pancrelipase  (CREON  6,000 Lipase Units) 2 Capsule(s) Oral three times a day with meals  pantoprazole    Tablet 40 milliGRAM(s) Oral before breakfast  polyethylene glycol 3350 17 Gram(s) Oral two times a day  potassium chloride   Powder 40 milliEquivalent(s) Oral every 2 hours  senna 2 Tablet(s) Oral at bedtime  sodium chloride 0.9%. 1000 milliLiter(s) (90 mL/Hr) IV Continuous <Continuous>  thiamine 100 milliGRAM(s) Oral daily    MEDICATIONS  (PRN):  acetaminophen     Tablet .. 650 milliGRAM(s) Oral every 6 hours PRN Temp greater or equal to 38C (100.4F), Mild Pain (1 - 3), Moderate Pain (4 - 6)  chlorproMAZINE    Tablet 25 milliGRAM(s) Oral every 8 hours PRN hiccups  cyclobenzaprine 5 milliGRAM(s) Oral three times a day PRN Muscle Spasm  oxyCODONE    IR 10 milliGRAM(s) Oral every 4 hours PRN Severe Pain (7 - 10)

## 2022-06-11 NOTE — DIETITIAN INITIAL EVALUATION ADULT - NS FNS WEIGHT CHANGE REASON
214 LBS/92.2 kg->117 LBS, reflect wt. loss of 101 LBS.   ? time frame of reported wt. loss.  Pt reported usual wt. of 218 LBS c wt. loss of 92 LBS on 03/20/19 in 11 years as per RD note/unintentional

## 2022-06-12 PROCEDURE — 99232 SBSQ HOSP IP/OBS MODERATE 35: CPT

## 2022-06-12 RX ADMIN — HEPARIN SODIUM 5000 UNIT(S): 5000 INJECTION INTRAVENOUS; SUBCUTANEOUS at 06:09

## 2022-06-12 RX ADMIN — OXYCODONE HYDROCHLORIDE 10 MILLIGRAM(S): 5 TABLET ORAL at 22:25

## 2022-06-12 RX ADMIN — Medication 1 MILLIGRAM(S): at 12:28

## 2022-06-12 RX ADMIN — SENNA PLUS 2 TABLET(S): 8.6 TABLET ORAL at 21:25

## 2022-06-12 RX ADMIN — Medication 100 MILLIGRAM(S): at 12:28

## 2022-06-12 RX ADMIN — OXYCODONE HYDROCHLORIDE 10 MILLIGRAM(S): 5 TABLET ORAL at 09:56

## 2022-06-12 RX ADMIN — OXYCODONE HYDROCHLORIDE 10 MILLIGRAM(S): 5 TABLET ORAL at 01:30

## 2022-06-12 RX ADMIN — OXYCODONE HYDROCHLORIDE 10 MILLIGRAM(S): 5 TABLET ORAL at 10:59

## 2022-06-12 RX ADMIN — Medication 5 MILLIGRAM(S): at 21:25

## 2022-06-12 RX ADMIN — HEPARIN SODIUM 5000 UNIT(S): 5000 INJECTION INTRAVENOUS; SUBCUTANEOUS at 14:42

## 2022-06-12 RX ADMIN — CYCLOBENZAPRINE HYDROCHLORIDE 5 MILLIGRAM(S): 10 TABLET, FILM COATED ORAL at 08:30

## 2022-06-12 RX ADMIN — OXYCODONE HYDROCHLORIDE 10 MILLIGRAM(S): 5 TABLET ORAL at 14:43

## 2022-06-12 RX ADMIN — OXYCODONE HYDROCHLORIDE 10 MILLIGRAM(S): 5 TABLET ORAL at 15:43

## 2022-06-12 RX ADMIN — HEPARIN SODIUM 5000 UNIT(S): 5000 INJECTION INTRAVENOUS; SUBCUTANEOUS at 21:25

## 2022-06-12 RX ADMIN — Medication 2 CAPSULE(S): at 12:28

## 2022-06-12 RX ADMIN — OXYCODONE HYDROCHLORIDE 10 MILLIGRAM(S): 5 TABLET ORAL at 04:45

## 2022-06-12 RX ADMIN — OXYCODONE HYDROCHLORIDE 10 MILLIGRAM(S): 5 TABLET ORAL at 21:25

## 2022-06-12 RX ADMIN — OXYCODONE HYDROCHLORIDE 10 MILLIGRAM(S): 5 TABLET ORAL at 00:54

## 2022-06-12 RX ADMIN — PANTOPRAZOLE SODIUM 40 MILLIGRAM(S): 20 TABLET, DELAYED RELEASE ORAL at 08:29

## 2022-06-12 RX ADMIN — Medication 25 MILLIGRAM(S): at 08:30

## 2022-06-12 RX ADMIN — OXYCODONE HYDROCHLORIDE 10 MILLIGRAM(S): 5 TABLET ORAL at 00:13

## 2022-06-12 RX ADMIN — Medication 25 MILLIGRAM(S): at 17:37

## 2022-06-12 RX ADMIN — Medication 2 CAPSULE(S): at 08:29

## 2022-06-12 RX ADMIN — Medication 2 CAPSULE(S): at 17:37

## 2022-06-12 NOTE — CONSULT NOTE ADULT - ASSESSMENT
HPI:  Patient well known to me from [facility].    61M with PMHx of chronic pancreatitis (complicated by chronic pain of which secondary to alcohol abuse), CKD4, and esophageal strictures (post-stent) presenting for acute-on-chronic epigastric pain for several days. Patient recently admitted to [facility] twice for hematemesis (with stent migration requiring balloon dilation of stricture and suturing of stent) and epigastric pain with admission for pain control with known pancreatic duct stone, but unable to perform ERCP due to stricture. After he was discharged on May 10 he saw his PMD approximately one week prior who agreed to increase his Percocet  to q4 hours PRN due to poorly controlled pain. He has pending outpatient GI and pain management follow ups. He states he has been taking about 8 Percocets per day due to poorly controlled pain. He states it is constant, severe and located in epigastric region. Denies diarrhea, constipation, nausea, vomiting or decrease PO intake. He came in to get more pain medications. CT A&P showing diffuse pancreatic calcifications with stable dilated pancreatic duct measuring 1.4 cm. Patient given 1 L NS and morphine 4 mg IV. He denies taking any other medications for pain. Denies any recent alcohol use. (09 Jun 2022 20:48)  ----- As Above -------  Called to see patient regarding abdominal / chest pain. Patient can not characterize the pain. Constant. Better with food ( patient requesting that the diet should be advanced ) Present since being discharged from [facility] .and running out of his pain medications He denies dysphagia or odynophagia  Patient has a history of an esophageal stricture that was treated with balloon dilatation and stent. Course complicated with migration of stent and bleeding. Patient had repeat EGD with correction of stent ( and sutured ). Bleeding had resolved. Patient is scheduled for an EGD with removal stent in the near future  The patient has a history of chronic pancreatitis, dilated PD and a PD stone. The plan is for ERCP after the stent is removed. ( patient denies ever having an ETOH  problem )  The patient denies melena, hematochezia, hematemesis, nausea, vomiting, constipation, diarrhea, or change in bowel movements     Abdominal / chest ? pain - Etiology of ?pain is unclear- R/O Chronic pancreatitis, PUD, muscular skeletal - 1) PPI / Carafate 2) Creon 3) Advance diet as tolerated 4) Pain management

## 2022-06-12 NOTE — PROGRESS NOTE ADULT - NUTRITIONAL ASSESSMENT
This patient has been assessed with a concern for Malnutrition and has been determined to have a diagnosis/diagnoses of Severe protein-calorie malnutrition and Underweight (BMI < 19).    This patient is being managed with:   Diet Regular-  Low Sodium  Supplement Feeding Modality:  Oral  Ensure Clear Cans or Servings Per Day:  1       Frequency:  Two Times a day  Entered: Jun 11 2022  2:32PM    Diet Regular-  Entered: Cesar 10 2022  5:54PM    The following pending diet order is being considered for treatment of Severe protein-calorie malnutrition and Underweight (BMI < 19):null
This patient has been assessed with a concern for Malnutrition and has been determined to have a diagnosis/diagnoses of Severe protein-calorie malnutrition and Underweight (BMI < 19).    This patient is being managed with:   Diet Regular-  Low Sodium  Supplement Feeding Modality:  Oral  Ensure Clear Cans or Servings Per Day:  1       Frequency:  Two Times a day  Entered: Jun 11 2022  2:32PM    Diet Regular-  Entered: Cesar 10 2022  5:54PM    The following pending diet order is being considered for treatment of Severe protein-calorie malnutrition and Underweight (BMI < 19):null

## 2022-06-12 NOTE — PROGRESS NOTE ADULT - ASSESSMENT
61M with PMHx of chronic pancreatitis (complicated by chronic pain of which secondary to alcohol abuse), CKD4, and esophageal strictures (post-stent) presenting for acute-on-chronic epigastric pain for several days. This is in the setting of running out of Percocet. CT A&P showing diffuse pancreatic calcifications with stable dilated pancreatic duct measuring 1.4 cm. Suspect patient's pain is due to chronic pancreatitis with no new process.    Hx:  HCV s/p SVR, and esophageal stricture s/p stent placement 4/19/2022 c/b need for reposition & suture 4/29/2022   Problem/Plan - 1:  ·  Problem: Chronic pancreatitis.   ·  Plan: Patient with known pancreatic duct retained stone and unable to perform ERCP. Pain could be from this or chronic pancreatitis with poor pain management as outpatient.  - lipase is down trending  - still complaining of pain with meals,however, eating full meals  - adjusting pain medications     GI eval placed     Problem/Plan - 2:  ·  Problem: Metabolic acidosis with respiratory alkalosis.   ·  Plan: c/w NS  - continue to monitor    Problem/Plan - 3:  ·  Problem: Stage 4 chronic kidney disease.   ·  Plan: Cr at baseline  Monitor renal function & renally dose all medications.    Problem/Plan - 4:  ·  Problem: Esophageal stricture.   ·  Plan: Continue with Protonix BID.    Problem/Plan - 5:  ·  Problem: Normocytic anemia.   ·  Plan: Likely AOCD  Monitor Hg.
61M with PMHx of chronic pancreatitis (complicated by chronic pain of which secondary to alcohol abuse), CKD4, and esophageal strictures (post-stent) presenting for acute-on-chronic epigastric pain for several days. This is in the setting of running out of Percocet. CT A&P showing diffuse pancreatic calcifications with stable dilated pancreatic duct measuring 1.4 cm. Suspect patient's pain is due to chronic pancreatitis with no new process.      Problem/Plan - 1:  ·  Problem: Chronic pancreatitis.   ·  Plan: Patient with known pancreatic duct retained stone and unable to perform ERCP. Pain could be from this or chronic pancreatitis with poor pain management as outpatient.  - follow lipase  - had pain after eating lunch    Problem/Plan - 2:  ·  Problem: Metabolic acidosis with respiratory alkalosis.   ·  Plan: c/w NS  - continue to monitor    Problem/Plan - 3:  ·  Problem: Stage 4 chronic kidney disease.   ·  Plan: Cr at baseline  Monitor renal function & renally dose all medications.    Problem/Plan - 4:  ·  Problem: Esophageal stricture.   ·  Plan: Continue with Protonix BID.    Problem/Plan - 5:  ·  Problem: Normocytic anemia.   ·  Plan: Likely AOCD  Monitor Hg.
61M with PMHx of chronic pancreatitis (complicated by chronic pain of which secondary to alcohol abuse), CKD4, and esophageal strictures (post-stent) presenting for acute-on-chronic epigastric pain for several days. This is in the setting of running out of Percocet. CT A&P showing diffuse pancreatic calcifications with stable dilated pancreatic duct measuring 1.4 cm. Suspect patient's pain is due to chronic pancreatitis with no new process.      Problem/Plan - 1:  ·  Problem: Chronic pancreatitis.   ·  Plan: Patient with known pancreatic duct retained stone and unable to perform ERCP. Pain could be from this or chronic pancreatitis with poor pain management as outpatient.  - lipase is down trending  - still complaining of pain with meals  - adjusting pain medications     Problem/Plan - 2:  ·  Problem: Metabolic acidosis with respiratory alkalosis.   ·  Plan: c/w NS  - continue to monitor    Problem/Plan - 3:  ·  Problem: Stage 4 chronic kidney disease.   ·  Plan: Cr at baseline  Monitor renal function & renally dose all medications.    Problem/Plan - 4:  ·  Problem: Esophageal stricture.   ·  Plan: Continue with Protonix BID.    Problem/Plan - 5:  ·  Problem: Normocytic anemia.   ·  Plan: Likely AOCD  Monitor Hg.

## 2022-06-12 NOTE — CONSULT NOTE ADULT - SUBJECTIVE AND OBJECTIVE BOX
HPI:  Patient well known to me from [facility].    61M with PMHx of chronic pancreatitis (complicated by chronic pain of which secondary to alcohol abuse), CKD4, and esophageal strictures (post-stent) presenting for acute-on-chronic epigastric pain for several days. Patient recently admitted to [facility] twice for hematemesis (with stent migration requiring balloon dilation of stricture and suturing of stent) and epigastric pain with admission for pain control with known pancreatic duct stone, but unable to perform ERCP due to stricture. After he was discharged on May 10 he saw his PMD approximately one week prior who agreed to increase his Percocet  to q4 hours PRN due to poorly controlled pain. He has pending outpatient GI and pain management follow ups. He states he has been taking about 8 Percocets per day due to poorly controlled pain. He states it is constant, severe and located in epigastric region. Denies diarrhea, constipation, nausea, vomiting or decrease PO intake. He came in to get more pain medications. CT A&P showing diffuse pancreatic calcifications with stable dilated pancreatic duct measuring 1.4 cm. Patient given 1 L NS and morphine 4 mg IV. He denies taking any other medications for pain. Denies any recent alcohol use. (2022 20:48)  ----- As Above ------- Covering for Dr Edwards  Called to see patient regarding abdominal / chest pain. Patient can not characterize the pain. Constant. Better with food ( patient requesting that the diet should be advanced ) Present since being discharged from [facility] and running out of his pain medications . He denies dysphagia or odynophagia  Patient has a history of an esophageal stricture that was treated with balloon dilatation and stent. Course complicated with migration of stent and bleeding. Patient had repeat EGD with correction of stent ( and sutured ). Bleeding had resolved. Patient is scheduled for an EGD with removal stent in the near future  The patient has a history of chronic pancreatitis, dilated PD and a PD stone. The plan is for ERCP after the stent is removed. ( patient denies ever having an ETOH  problem )  The patient denies melena, hematochezia, hematemesis, nausea, vomiting, constipation, diarrhea, or change in bowel movements         PAST MEDICAL & SURGICAL HISTORY:  ETOH abuse  sober x1 year approximately      Pancreatitis      Hepatitis C  treated      Gout      HTN (hypertension)      Chronic kidney disease (CKD)      H/O chronic pancreatitis      Gout      Alcohol abuse      Gastric perforation          MEDICATIONS  (STANDING):  bisacodyl 5 milliGRAM(s) Oral at bedtime  folic acid 1 milliGRAM(s) Oral daily  heparin   Injectable 5000 Unit(s) SubCutaneous every 8 hours  pancrelipase  (CREON  6,000 Lipase Units) 2 Capsule(s) Oral three times a day with meals  pantoprazole    Tablet 40 milliGRAM(s) Oral before breakfast  polyethylene glycol 3350 17 Gram(s) Oral two times a day  senna 2 Tablet(s) Oral at bedtime  sodium chloride 0.9%. 1000 milliLiter(s) (90 mL/Hr) IV Continuous <Continuous>  thiamine 100 milliGRAM(s) Oral daily    MEDICATIONS  (PRN):  acetaminophen     Tablet .. 650 milliGRAM(s) Oral every 6 hours PRN Temp greater or equal to 38C (100.4F), Mild Pain (1 - 3), Moderate Pain (4 - 6)  chlorproMAZINE    Tablet 25 milliGRAM(s) Oral every 8 hours PRN hiccups  cyclobenzaprine 5 milliGRAM(s) Oral three times a day PRN Muscle Spasm  oxyCODONE    IR 10 milliGRAM(s) Oral every 4 hours PRN Severe Pain (7 - 10)      Allergies    No Known Allergies    Intolerances        FAMILY HISTORY:  FH: HTN (hypertension)        REVIEW OF SYSTEMS:    CONSTITUTIONAL: No fever, weight loss, or fatigue  EYES: No eye pain, visual disturbances, or discharge  ENMT:  No difficulty hearing, tinnitus, vertigo; No sinus or throat pain  NECK: No pain or stiffness  BREASTS: No pain, masses, or nipple discharge  RESPIRATORY: No cough, wheezing, chills or hemoptysis; No shortness of breath  CARDIOVASCULAR: No chest pain, palpitations, dizziness, or leg swelling  GASTROINTESTINAL: See above   GENITOURINARY: No dysuria, frequency, hematuria, or incontinence  NEUROLOGICAL: No headaches, memory loss, loss of strength, numbness, or tremors  SKIN: No itching, burning, rashes, or lesions   LYMPH NODES: No enlarged glands  ENDOCRINE: No heat or cold intolerance; No hair loss  MUSCULOSKELETAL: No joint pain or swelling; No muscle, back, or extremity pain  PSYCHIATRIC: No depression, anxiety, mood swings, or difficulty sleeping  HEME/LYMPH: No easy bruising, or bleeding gums  ALLERGY AND IMMUNOLOGIC: No hives or eczema          SOCIAL HISTORY:    FAMILY HISTORY:  FH: HTN (hypertension)        Vital Signs Last 24 Hrs  T(C): 36.8 (2022 11:14), Max: 36.8 (2022 05:36)  T(F): 98.3 (2022 11:14), Max: 98.3 (2022 11:14)  HR: 78 (2022 11:14) (78 - 85)  BP: 136/81 (2022 11:14) (113/67 - 136/81)  BP(mean): --  RR: 19 (2022 11:14) (18 - 19)  SpO2: 100% (2022 11:14) (100% - 100%)    PHYSICAL EXAM:    GENERAL: NAD, well-groomed, well-developed  HEAD:  Atraumatic, Normocephalic  EYES: EOMI, PERRLA, conjunctiva and sclera clear  ENMT: No tonsillar erythema, exudates, or enlargement; Moist mucous membranes, Good dentition, No lesions  NECK: Supple, No JVD, Normal thyroid  NERVOUS SYSTEM:  Alert & Oriented X3, Good concentration; Motor Strength 5/5 B/L upper and lower extremities; DTRs 2+ intact and symmetric  CHEST/LUNG: Clear to percussion bilaterally; No rales, rhonchi, wheezing, or rubs. Pain reproducible with light touching on chest  HEART: Regular rate and rhythm; No murmurs, rubs, or gallops  ABDOMEN: Soft, pain reproducible with light touching of the abdomen , Nondistended; Bowel sounds present  EXTREMITIES:  2+ Peripheral Pulses, No clubbing, cyanosis, or edema  LYMPH: No lymphadenopathy noted   RECTAL:  Deferred   SKIN: No rashes or lesions    LABS:                        8.8    9.29  )-----------( 236      ( 2022 07:45 )             28.3       CBC:  -11 @ 07:45  WBC  9.29  HGB 8.8  HCT 28.3 Plate 236  MCV 99.0  06-10 @ 06:48  WBC  10.39  HGB 9.7  HCT 32.0 Plate 260  MCV 99.1  06-09 @ 17:09  WBC  10.58  HGB 11.6  HCT 37.3 Plate 277  MCV 99.2           2022 07:45    140    |  100    |  44     ----------------------------<  80     3.3     |  36     |  2.48   10 Cesar 2022 06:48    143    |  93     |  46     ----------------------------<  205    2.8     |  42     |  2.64   2022 17:09    140    |  90     |  48     ----------------------------<  91     3.8     |  >45    |  2.61     Ca    8.4        2022 07:45  Ca    8.5        10 Cesar 2022 06:48  Ca    9.2        2022 17:09  Phos  2.0       2022 07:45  Phos  2.0       10 Cesar 2022 06:48  Mg     2.2       10 Cesar 2022 06:48    TPro  5.6    /  Alb  2.3    /  TBili  0.1    /  DBili  x      /  AST  16     /  ALT  17     /  AlkPhos  67     2022 07:45  TPro  5.6    /  Alb  2.4    /  TBili  0.2    /  DBili  x      /  AST  13     /  ALT  21     /  AlkPhos  74     10 Cesar 2022 06:48  TPro  6.8    /  Alb  2.9    /  TBili  0.2    /  DBili  x      /  AST  31     /  ALT  28     /  AlkPhos  84     2022 17:09      Urinalysis Basic - ( 2022 08:59 )    Color: Yellow / Appearance: Clear / S.015 / pH: x  Gluc: x / Ketone: Negative  / Bili: Negative / Urobili: Negative mg/dL   Blood: x / Protein: 30 mg/dL / Nitrite: Negative   Leuk Esterase: Negative / RBC: 0-2 /HPF / WBC 0-2   Sq Epi: x / Non Sq Epi: Few / Bacteria: Few          RADIOLOGY & ADDITIONAL STUDIES:  < from: CT Abdomen and Pelvis No Cont (22 @ 19:01) >    ACC: [id number] EXAM:  CT ABDOMEN AND PELVIS                          PROCEDURE DATE:  2022          INTERPRETATION:  CLINICAL INFORMATION: Abdominal pain    COMPARISON: 2022    CONTRAST/COMPLICATIONS:  IV Contrast: None  Oral Contrast: None  Complications: None    PROCEDURE:  CT of the Abdomen and Pelvis was performed.  Sagittal and coronal reformats were performed.    FINDINGS:  LOWER CHEST: Clear lung bases. Distal esophageal stent.    Please note that evaluation of the abdominal organs and vascular   structures is limited by lack of intravenous contrast.    LIVER: Within normal limits.  BILE DUCTS: Normal caliber.  GALLBLADDER: Within normal limits.  SPLEEN: Within normal limits.  PANCREAS: Diffuse pancreatic calcifications. Stable dilated pancreatic   duct measuring up to 1.4 cm.  ADRENALS: Within normal limits.  KIDNEYS/URETERS: No renal stones or hydronephrosis. Left renal   hypodensity, possibly cyst.    BLADDER: Within normal limits.  REPRODUCTIVE ORGANS: Prostate is enlarged.    BOWEL: No bowel obstruction. Appendix is not visualized.  PERITONEUM: No ascites.  VESSELS: Atherosclerotic calcifications.  RETROPERITONEUM/LYMPH NODES: No lymphadenopathy.  ABDOMINAL WALL: Within normal limits.  BONES: Mild degenerative changes.    IMPRESSION:  Evidence of chronic pancreatitis, stable.  No bowel obstruction.      --- End of Report ---            JUSTIN CHAVEZ MD; Attending Radiologist  This document has been electronically signed. 2022  7:24PM    < end of copied text >

## 2022-06-13 LAB
ALBUMIN SERPL ELPH-MCNC: 2.3 G/DL — LOW (ref 3.3–5)
ALP SERPL-CCNC: 62 U/L — SIGNIFICANT CHANGE UP (ref 40–120)
ALT FLD-CCNC: 15 U/L — SIGNIFICANT CHANGE UP (ref 12–78)
ANION GAP SERPL CALC-SCNC: 4 MMOL/L — LOW (ref 5–17)
AST SERPL-CCNC: 13 U/L — LOW (ref 15–37)
BILIRUB SERPL-MCNC: 0.2 MG/DL — SIGNIFICANT CHANGE UP (ref 0.2–1.2)
BUN SERPL-MCNC: 42 MG/DL — HIGH (ref 7–23)
CALCIUM SERPL-MCNC: 8.8 MG/DL — SIGNIFICANT CHANGE UP (ref 8.5–10.1)
CHLORIDE SERPL-SCNC: 101 MMOL/L — SIGNIFICANT CHANGE UP (ref 96–108)
CO2 SERPL-SCNC: 35 MMOL/L — HIGH (ref 22–31)
CREAT SERPL-MCNC: 2.35 MG/DL — HIGH (ref 0.5–1.3)
EGFR: 31 ML/MIN/1.73M2 — LOW
GLUCOSE SERPL-MCNC: 85 MG/DL — SIGNIFICANT CHANGE UP (ref 70–99)
HCT VFR BLD CALC: 27.7 % — LOW (ref 39–50)
HGB BLD-MCNC: 8.7 G/DL — LOW (ref 13–17)
MCHC RBC-ENTMCNC: 31.1 PG — SIGNIFICANT CHANGE UP (ref 27–34)
MCHC RBC-ENTMCNC: 31.4 G/DL — LOW (ref 32–36)
MCV RBC AUTO: 98.9 FL — SIGNIFICANT CHANGE UP (ref 80–100)
NRBC # BLD: 0 /100 WBCS — SIGNIFICANT CHANGE UP (ref 0–0)
PLATELET # BLD AUTO: 231 K/UL — SIGNIFICANT CHANGE UP (ref 150–400)
POTASSIUM SERPL-MCNC: 4.5 MMOL/L — SIGNIFICANT CHANGE UP (ref 3.5–5.3)
POTASSIUM SERPL-SCNC: 4.5 MMOL/L — SIGNIFICANT CHANGE UP (ref 3.5–5.3)
PROT SERPL-MCNC: 5.4 GM/DL — LOW (ref 6–8.3)
RBC # BLD: 2.8 M/UL — LOW (ref 4.2–5.8)
RBC # FLD: 17.1 % — HIGH (ref 10.3–14.5)
SODIUM SERPL-SCNC: 140 MMOL/L — SIGNIFICANT CHANGE UP (ref 135–145)
WBC # BLD: 7.46 K/UL — SIGNIFICANT CHANGE UP (ref 3.8–10.5)
WBC # FLD AUTO: 7.46 K/UL — SIGNIFICANT CHANGE UP (ref 3.8–10.5)

## 2022-06-13 PROCEDURE — 90792 PSYCH DIAG EVAL W/MED SRVCS: CPT

## 2022-06-13 PROCEDURE — 99232 SBSQ HOSP IP/OBS MODERATE 35: CPT

## 2022-06-13 RX ORDER — CYCLOBENZAPRINE HYDROCHLORIDE 10 MG/1
5 TABLET, FILM COATED ORAL THREE TIMES A DAY
Refills: 0 | Status: DISCONTINUED | OUTPATIENT
Start: 2022-06-13 | End: 2022-06-14

## 2022-06-13 RX ORDER — GABAPENTIN 400 MG/1
300 CAPSULE ORAL AT BEDTIME
Refills: 0 | Status: DISCONTINUED | OUTPATIENT
Start: 2022-06-13 | End: 2022-06-14

## 2022-06-13 RX ADMIN — OXYCODONE HYDROCHLORIDE 10 MILLIGRAM(S): 5 TABLET ORAL at 03:10

## 2022-06-13 RX ADMIN — HEPARIN SODIUM 5000 UNIT(S): 5000 INJECTION INTRAVENOUS; SUBCUTANEOUS at 13:54

## 2022-06-13 RX ADMIN — OXYCODONE HYDROCHLORIDE 10 MILLIGRAM(S): 5 TABLET ORAL at 17:21

## 2022-06-13 RX ADMIN — HEPARIN SODIUM 5000 UNIT(S): 5000 INJECTION INTRAVENOUS; SUBCUTANEOUS at 22:24

## 2022-06-13 RX ADMIN — Medication 100 MILLIGRAM(S): at 12:38

## 2022-06-13 RX ADMIN — OXYCODONE HYDROCHLORIDE 10 MILLIGRAM(S): 5 TABLET ORAL at 22:54

## 2022-06-13 RX ADMIN — OXYCODONE HYDROCHLORIDE 10 MILLIGRAM(S): 5 TABLET ORAL at 22:24

## 2022-06-13 RX ADMIN — OXYCODONE HYDROCHLORIDE 10 MILLIGRAM(S): 5 TABLET ORAL at 12:39

## 2022-06-13 RX ADMIN — Medication 2 CAPSULE(S): at 08:32

## 2022-06-13 RX ADMIN — OXYCODONE HYDROCHLORIDE 10 MILLIGRAM(S): 5 TABLET ORAL at 18:21

## 2022-06-13 RX ADMIN — HEPARIN SODIUM 5000 UNIT(S): 5000 INJECTION INTRAVENOUS; SUBCUTANEOUS at 05:19

## 2022-06-13 RX ADMIN — Medication 5 MILLIGRAM(S): at 22:24

## 2022-06-13 RX ADMIN — Medication 2 CAPSULE(S): at 17:22

## 2022-06-13 RX ADMIN — Medication 2 CAPSULE(S): at 12:38

## 2022-06-13 RX ADMIN — CYCLOBENZAPRINE HYDROCHLORIDE 5 MILLIGRAM(S): 10 TABLET, FILM COATED ORAL at 22:25

## 2022-06-13 RX ADMIN — Medication 25 MILLIGRAM(S): at 08:33

## 2022-06-13 RX ADMIN — Medication 25 MILLIGRAM(S): at 17:22

## 2022-06-13 RX ADMIN — OXYCODONE HYDROCHLORIDE 10 MILLIGRAM(S): 5 TABLET ORAL at 09:32

## 2022-06-13 RX ADMIN — SENNA PLUS 2 TABLET(S): 8.6 TABLET ORAL at 22:24

## 2022-06-13 RX ADMIN — OXYCODONE HYDROCHLORIDE 10 MILLIGRAM(S): 5 TABLET ORAL at 04:10

## 2022-06-13 RX ADMIN — OXYCODONE HYDROCHLORIDE 10 MILLIGRAM(S): 5 TABLET ORAL at 13:37

## 2022-06-13 RX ADMIN — GABAPENTIN 300 MILLIGRAM(S): 400 CAPSULE ORAL at 22:24

## 2022-06-13 RX ADMIN — PANTOPRAZOLE SODIUM 40 MILLIGRAM(S): 20 TABLET, DELAYED RELEASE ORAL at 08:32

## 2022-06-13 RX ADMIN — OXYCODONE HYDROCHLORIDE 10 MILLIGRAM(S): 5 TABLET ORAL at 08:32

## 2022-06-13 NOTE — BH CONSULTATION LIAISON ASSESSMENT NOTE - RISK ASSESSMENT
chronic risk factors: ongoing substance use; psychosocial stressors; chronic untreatable illness; single. Protective factors: young; healthy; medication and treatment compliant; no history of hospitalizations, no formal diagnosis; no suicide attempts; no self-injurious behavior; no hx of aggression/violence; no legal issues; motivated for help; articulate; strong family support; access to health services. No acute risk factors identified.

## 2022-06-13 NOTE — BH CONSULTATION LIAISON ASSESSMENT NOTE - MSE UNSTRUCTURED FT
sitting in bed, clutching gastric area reporting pain, asking for his thorazine and oxycodone. Patient looks distressed. Able to answer questions - reports he has chronic pain there, food makes it better, he has a stent in.

## 2022-06-13 NOTE — BH CONSULTATION LIAISON ASSESSMENT NOTE - NSBHCHARTREVIEWLAB_PSY_A_CORE FT
03-21    134<L>  |  105  |  71<H>  ----------------------------<  96  2.7<LL>   |  18<L>  |  3.07<H>    Ca    7.6<L>      21 Mar 2019 08:11  Phos  1.0     03-21  Mg     2.8     03-21    TPro  5.8<L>  /  Alb  2.7<L>  /  TBili  0.3  /  DBili  x   /  AST  30  /  ALT  15  /  AlkPhos  93  03-21 06-13    140  |  101  |  42<H>  ----------------------------<  85  4.5   |  35<H>  |  2.35<H>    Ca    8.8      13 Jun 2022 08:02    TPro  5.4<L>  /  Alb  2.3<L>  /  TBili  0.2  /  DBili  x   /  AST  13<L>  /  ALT  15  /  AlkPhos  62  06-13

## 2022-06-13 NOTE — BH CONSULTATION LIAISON ASSESSMENT NOTE - HPI (INCLUDE ILLNESS QUALITY, SEVERITY, DURATION, TIMING, CONTEXT, MODIFYING FACTORS, ASSOCIATED SIGNS AND SYMPTOMS)
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 Patient has been ordering huge quantities of food, (Pt's list of request of items are several lines long as shown by nursing to Writer), raising concern from staff. Psychiatric consult hence requested.     4/20/22 [facility] GI note: "Esophageal stricture w/ dysphagia - Likely benign peptic stricture given long-standing severe esophagitis. S/p EGD w/ stent placement 4/19 (fully covered WallFlex stent, 18 mm x 103 mm) #Abd pain - likely 2/2 chronic pancreatitis. Unrelated to stricture. Exam is notable for severe TTP to light skin touch. Possible neuropathic component"    4/20/22 [facility] Pain management note: "1) Consider PO Acetaminophen 650mg Q6H standing x2 days, then PRN for pain. Not to be given within 6 hours of last dose of Acetaminophen.  2) Consider discontinuing current orders for PO Flexeril instead order PO Flexeril 5mg standing Q8H. Hold for over sedation.  3) Consider changing PO Gabapentin to 300mg at bedtime. Hold for over sedation.  4) Consider continuing current orders for PO Oxycodone. Hold for over sedation. Not to be given within one hour of any immediate acting opioid.    ISTOP  Reference #: [id number]    Rx Written	Rx Dispensed	Drug	Quantity	Days Supply	Prescriber Name	Prescriber Ana #	Payment Method	Dispenser  05/18/2022	05/25/2022	oxycodone-acetaminophen  mg tab	112	19	Lamberto Hurtado	[id number]	Medicaid	[facility]  05/11/2022	05/11/2022	oxycodone-acetaminophen 5-325 mg tab	112	14	Lamberto Hurtado	[id number]	Medicaid	[facility]  05/10/2022	05/10/2022	oxycodone hcl (ir) 10 mg tab	28	7	[facility]	[id number]	Medicaid	[facility]  04/23/2022	04/25/2022	oxycodone hcl (ir) 10 mg tab	42	7	[facility]	[id number]	Insurance	[facility]  03/25/2022	04/01/2022	oxycodone-acetaminophen 5-325 mg tab	112	14	Lamberto Hurtado	[id number]	Medicaid	[facility]   LAST SEEN BY WRITER 3/26/2019: 60yo AAM, single, noncaregiver, undomiciled, last known to have 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 Patient has been ordering huge quantities of food, (Pt's list of request of items are several lines long as shown by nursing to Writer), raising concern from staff. Psychiatric consult hence requested. EXAM: sitting in bed, clutching gastric area reporting pain, asking for his thorazine and oxycodone. Patient looks distressed. Able to answer questions - reports he has chronic pain there, food makes it better, he has a stent in.     4/20/22 [facility] GI note: "Esophageal stricture w/ dysphagia - Likely benign peptic stricture given long-standing severe esophagitis. S/p EGD w/ stent placement 4/19 (fully covered WallFlex stent, 18 mm x 103 mm) #Abd pain - likely 2/2 chronic pancreatitis. Unrelated to stricture. Exam is notable for severe TTP to light skin touch. Possible neuropathic component"    4/20/22 [facility] Pain management note: "1) Consider PO Acetaminophen 650mg Q6H standing x2 days, then PRN for pain. Not to be given within 6 hours of last dose of Acetaminophen.  2) Consider discontinuing current orders for PO Flexeril instead order PO Flexeril 5mg standing Q8H. Hold for over sedation.  3) Consider changing PO Gabapentin to 300mg at bedtime. Hold for over sedation.  4) Consider continuing current orders for PO Oxycodone. Hold for over sedation. Not to be given within one hour of any immediate acting opioid.    ISTOP  Reference #: [id number]    Rx Written	Rx Dispensed	Drug	Quantity	Days Supply	Prescriber Name	Prescriber Ana #	Payment Method	Dispenser  05/18/2022	05/25/2022	oxycodone-acetaminophen  mg tab	112	19	Lamberto Hurtado	[id number]	Medicaid	[facility]  05/11/2022	05/11/2022	oxycodone-acetaminophen 5-325 mg tab	112	14	Lamberto Hurtado	[id number]	Medicaid	[facility]  05/10/2022	05/10/2022	oxycodone hcl (ir) 10 mg tab	28	7	[facility]	[id number]	Medicaid	[facility]  04/23/2022	04/25/2022	oxycodone hcl (ir) 10 mg tab	42	7	[facility]	[id number]	Insurance	[facility]  03/25/2022	04/01/2022	oxycodone-acetaminophen 5-325 mg tab	112	14	Lamberto Hurtado	[id number]	Medicaid	[facility]

## 2022-06-13 NOTE — CONSULT NOTE ADULT - SUBJECTIVE AND OBJECTIVE BOX
Patient chart reviewed, full consult to follow.     Patient known to my office.    Thank you for the courtesy of this consultation. [facility] NEPHROLOGY SERVICES, [facility]  NEPHROLOGY AND HYPERTENSION  [address]  [address]  [place] [address]  [phone]    MD HALIE OLIVIA MD ANDREY GONCHARUK, MD MADHU KORRAPATI, MD YELENA ROSENBERG, MD BINNY KOSHY, MD CHRISTOPHER CAPUTO, MD EDWARD BOVER, MD      Information from chart:  "Patient is a 61y old  Male who presents with a chief complaint of Epigastric Pain (13 Jun 2022 12:57)    HPI:  Patient well known to me from [facility].    61M with PMHx of chronic pancreatitis (complicated by chronic pain of which secondary to alcohol abuse), CKD4, and esophageal strictures (post-stent) presenting for acute-on-chronic epigastric pain for several days. Patient recently admitted to [facility] twice for hematemesis (with stent migration requiring balloon dilation of stricture and suturing of stent) and epigastric pain with admission for pain control with known pancreatic duct stone, but unable to perform ERCP due to stricture. After he was discharged on May 10 he saw his PMD approximately one week prior who agreed to increase his Percocet  to q4 hours PRN due to poorly controlled pain. He has pending outpatient GI and pain management follow ups. He states he has been taking about 8 Percocets per day due to poorly controlled pain. He states it is constant, severe and located in epigastric region. Denies diarrhea, constipation, nausea, vomiting or decrease PO intake. He came in to get more pain medications. CT A&P showing diffuse pancreatic calcifications with stable dilated pancreatic duct measuring 1.4 cm. Patient given 1 L NS and morphine 4 mg IV. He denies taking any other medications for pain. Denies any recent alcohol use. (09 Jun 2022 20:48)   "      Patient known to me;   Hx of CKD 3;     PAST MEDICAL & SURGICAL HISTORY:  ETOH abuse  sober x1 year approximately      Pancreatitis      Hepatitis C  treated      Gout      HTN (hypertension)      Chronic kidney disease (CKD)      H/O chronic pancreatitis      Gout      Alcohol abuse      Gastric perforation        FAMILY HISTORY:  FH: HTN (hypertension)      Allergies    No Known Allergies    Intolerances      Home Medications:  bisacodyl 5 mg oral delayed release tablet: 1 tab(s) orally once a day (at bedtime) (09 Jun 2022 21:07)  Creon 6000 units oral delayed release capsule: 2 cap(s) orally 3 times a day (09 Jun 2022 21:07)  MiraLax oral powder for reconstitution: 17 gram(s) orally 2 times a day (09 Jun 2022 21:07)  pantoprazole 40 mg oral delayed release tablet: 1 tab(s) orally 2 times a day (09 Jun 2022 21:07)  Percocet 10/325 oral tablet: 1 tab(s) orally every 4 hours, As Needed (09 Jun 2022 21:07)  thiamine 100 mg oral tablet: 1 tab(s) orally once a day (09 Jun 2022 21:07)    MEDICATIONS  (STANDING):  bisacodyl 5 milliGRAM(s) Oral at bedtime  cyclobenzaprine 5 milliGRAM(s) Oral three times a day  folic acid 1 milliGRAM(s) Oral daily  gabapentin 300 milliGRAM(s) Oral at bedtime  heparin   Injectable 5000 Unit(s) SubCutaneous every 8 hours  pancrelipase  (CREON  6,000 Lipase Units) 2 Capsule(s) Oral three times a day with meals  pantoprazole    Tablet 40 milliGRAM(s) Oral before breakfast  polyethylene glycol 3350 17 Gram(s) Oral two times a day  senna 2 Tablet(s) Oral at bedtime  sodium chloride 0.9%. 1000 milliLiter(s) (90 mL/Hr) IV Continuous <Continuous>  thiamine 100 milliGRAM(s) Oral daily    MEDICATIONS  (PRN):  acetaminophen     Tablet .. 650 milliGRAM(s) Oral every 6 hours PRN Temp greater or equal to 38C (100.4F), Mild Pain (1 - 3), Moderate Pain (4 - 6)  chlorproMAZINE    Tablet 25 milliGRAM(s) Oral every 8 hours PRN hiccups  oxyCODONE    IR 10 milliGRAM(s) Oral every 4 hours PRN Severe Pain (7 - 10)    Vital Signs Last 24 Hrs  T(C): 36.5 (13 Jun 2022 17:33), Max: 36.9 (13 Jun 2022 05:12)  T(F): 97.7 (13 Jun 2022 17:33), Max: 98.5 (13 Jun 2022 05:12)  HR: 104 (13 Jun 2022 17:33) (83 - 104)  BP: 121/73 (13 Jun 2022 17:33) (95/58 - 123/75)  BP(mean): --  RR: 17 (13 Jun 2022 17:33) (17 - 20)  SpO2: 100% (13 Jun 2022 17:33) (99% - 100%)    Daily     Daily     06-12-22 @ 07:01  -  06-13-22 @ 07:00  --------------------------------------------------------  IN: 1100 mL / OUT: 1100 mL / NET: 0 mL      CAPILLARY BLOOD GLUCOSE        PHYSICAL EXAM:      T(C): 36.5 (06-13-22 @ 17:33), Max: 36.9 (06-13-22 @ 05:12)  HR: 104 (06-13-22 @ 17:33) (83 - 104)  BP: 121/73 (06-13-22 @ 17:33) (95/58 - 123/75)  RR: 17 (06-13-22 @ 17:33) (17 - 20)  SpO2: 100% (06-13-22 @ 17:33) (99% - 100%)  Wt(kg): --  Lungs clear  Heart S1S2  Abd soft NT ND  Extremities:   tr edema              06-13    140  |  101  |  42<H>  ----------------------------<  85  4.5   |  35<H>  |  2.35<H>    Ca    8.8      13 Jun 2022 08:02    TPro  5.4<L>  /  Alb  2.3<L>  /  TBili  0.2  /  DBili  x   /  AST  13<L>  /  ALT  15  /  AlkPhos  62  06-13                          8.7    7.46  )-----------( 231      ( 13 Jun 2022 08:02 )             27.7     Creatinine Trend: 2.35<--, 2.48<--, 2.64<--, 2.61<--      Trend Bun/Cr  06-13-22 @ 08:02  BUN/CR -  42<H> / 2.35<H>  06-11-22 @ 07:45  BUN/CR -  44<H> / 2.48<H>  06-10-22 @ 06:48  BUN/CR -  46<H> / 2.64<H>  06-09-22 @ 17:09  BUN/CR -  48<H> / 2.61<H>  05-08-22 @ 08:30  BUN/CR -  41<H> / 2.69<H>  05-07-22 @ 11:31  BUN/CR -  33<H> / 2.56<H>  05-06-22 @ 20:02  BUN/CR -  39<H> / 3.12<H>  05-02-22 @ 07:08  BUN/CR -  26<H> / 2.40<H>  04-30-22 @ 07:23  BUN/CR -  29<H> / 2.48<H>  04-29-22 @ 05:36  BUN/CR -  35<H> / 2.67<H>  04-28-22 @ 21:09  BUN/CR -  40<H> / 2.63<H>  04-28-22 @ 09:53  BUN/CR -  46<H> / 2.82<H>      Assessment   CKD 3-4; pre renal azotemia  Contraction alkalosis suspected     Plan  Continue isotonic saline   Will follow     Austin Dukes MD

## 2022-06-13 NOTE — BH CONSULTATION LIAISON ASSESSMENT NOTE - NSBHCHARTREVIEWINVESTIGATE_PSY_A_CORE FT
QTC 519ms  CT Abdomen and Pelvis No Cont (06.09.22 @ 19:01) Evidence of chronic pancreatitis, stable. No bowel obstruction.

## 2022-06-13 NOTE — BH CONSULTATION LIAISON ASSESSMENT NOTE - SUMMARY
As of yet unclear motivation behind the large sums of food with every meal request which Patient is physically not capable of consuming. Patient is also very thin and gaunt on inspection - does not present as someone who eats large sums of food. Maybe collecting it to take home?

## 2022-06-13 NOTE — BH CONSULTATION LIAISON ASSESSMENT NOTE - NSBHCHARTREVIEWVS_PSY_A_CORE FT
Vital Signs Last 24 Hrs  T(C): 36.9 (13 Jun 2022 11:27), Max: 36.9 (13 Jun 2022 05:12)  T(F): 98.5 (13 Jun 2022 11:27), Max: 98.5 (13 Jun 2022 05:12)  HR: 83 (13 Jun 2022 11:27) (83 - 90)  BP: 123/75 (13 Jun 2022 11:27) (95/58 - 123/75)  BP(mean): --  RR: 18 (13 Jun 2022 11:27) (18 - 20)  SpO2: 100% (13 Jun 2022 11:27) (99% - 100%)

## 2022-06-13 NOTE — BH CONSULTATION LIAISON ASSESSMENT NOTE - NSBHCONSULTRECOMMENDOTHER_PSY_A_CORE FT
Pain management: as per [facility] pain management consult recs, ordered Flexeril 5mg standing Q8H. Hold for over sedation and PO Gabapentin to 300mg at bedtime  - as for ordering large sums of food, unable to ascertain that at this time.

## 2022-06-14 ENCOUNTER — TRANSCRIPTION ENCOUNTER (OUTPATIENT)
Age: 62
End: 2022-06-14

## 2022-06-14 VITALS
SYSTOLIC BLOOD PRESSURE: 137 MMHG | DIASTOLIC BLOOD PRESSURE: 76 MMHG | OXYGEN SATURATION: 100 % | RESPIRATION RATE: 21 BRPM | TEMPERATURE: 98 F | HEART RATE: 82 BPM

## 2022-06-14 LAB
BASE EXCESS BLDV CALC-SCNC: 4.6 MMOL/L — HIGH (ref -2–3)
BLOOD GAS COMMENTS, VENOUS: SIGNIFICANT CHANGE UP
CO2 BLDV-SCNC: 33 MMOL/L — HIGH (ref 22–26)
GAS PNL BLDV: SIGNIFICANT CHANGE UP
HCO3 BLDV-SCNC: 32 MMOL/L — HIGH (ref 22–28)
PCO2 BLDV: 56 MMHG — HIGH (ref 42–55)
PH BLDV: 7.36 — SIGNIFICANT CHANGE UP (ref 7.32–7.43)
PO2 BLDV: 30 MMHG — SIGNIFICANT CHANGE UP (ref 25–45)
SAO2 % BLDV: 55 % — LOW (ref 94–98)

## 2022-06-14 PROCEDURE — 99239 HOSP IP/OBS DSCHRG MGMT >30: CPT

## 2022-06-14 PROCEDURE — 99231 SBSQ HOSP IP/OBS SF/LOW 25: CPT

## 2022-06-14 RX ORDER — GABAPENTIN 400 MG/1
1 CAPSULE ORAL
Qty: 15 | Refills: 0
Start: 2022-06-14 | End: 2022-06-28

## 2022-06-14 RX ADMIN — Medication 1 MILLIGRAM(S): at 13:16

## 2022-06-14 RX ADMIN — OXYCODONE HYDROCHLORIDE 10 MILLIGRAM(S): 5 TABLET ORAL at 11:54

## 2022-06-14 RX ADMIN — OXYCODONE HYDROCHLORIDE 10 MILLIGRAM(S): 5 TABLET ORAL at 05:35

## 2022-06-14 RX ADMIN — HEPARIN SODIUM 5000 UNIT(S): 5000 INJECTION INTRAVENOUS; SUBCUTANEOUS at 13:17

## 2022-06-14 RX ADMIN — Medication 100 MILLIGRAM(S): at 13:16

## 2022-06-14 RX ADMIN — OXYCODONE HYDROCHLORIDE 10 MILLIGRAM(S): 5 TABLET ORAL at 01:46

## 2022-06-14 RX ADMIN — OXYCODONE HYDROCHLORIDE 10 MILLIGRAM(S): 5 TABLET ORAL at 15:43

## 2022-06-14 RX ADMIN — OXYCODONE HYDROCHLORIDE 10 MILLIGRAM(S): 5 TABLET ORAL at 10:54

## 2022-06-14 RX ADMIN — HEPARIN SODIUM 5000 UNIT(S): 5000 INJECTION INTRAVENOUS; SUBCUTANEOUS at 05:34

## 2022-06-14 RX ADMIN — PANTOPRAZOLE SODIUM 40 MILLIGRAM(S): 20 TABLET, DELAYED RELEASE ORAL at 10:55

## 2022-06-14 RX ADMIN — Medication 2 CAPSULE(S): at 10:54

## 2022-06-14 RX ADMIN — OXYCODONE HYDROCHLORIDE 10 MILLIGRAM(S): 5 TABLET ORAL at 16:43

## 2022-06-14 RX ADMIN — Medication 2 CAPSULE(S): at 15:43

## 2022-06-14 RX ADMIN — OXYCODONE HYDROCHLORIDE 10 MILLIGRAM(S): 5 TABLET ORAL at 02:16

## 2022-06-14 RX ADMIN — CYCLOBENZAPRINE HYDROCHLORIDE 5 MILLIGRAM(S): 10 TABLET, FILM COATED ORAL at 13:16

## 2022-06-14 RX ADMIN — CYCLOBENZAPRINE HYDROCHLORIDE 5 MILLIGRAM(S): 10 TABLET, FILM COATED ORAL at 05:34

## 2022-06-14 NOTE — BH CONSULTATION LIAISON PROGRESS NOTE - NSBHCHARTREVIEWLAB_PSY_A_CORE FT
06-13    140  |  101  |  42<H>  ----------------------------<  85  4.5   |  35<H>  |  2.35<H>    Ca    8.8      13 Jun 2022 08:02    TPro  5.4<L>  /  Alb  2.3<L>  /  TBili  0.2  /  DBili  x   /  AST  13<L>  /  ALT  15  /  AlkPhos  62  06-13

## 2022-06-14 NOTE — BH CONSULTATION LIAISON PROGRESS NOTE - NSBHASSESSMENTFT_PSY_ALL_CORE
Motivation behind the large sums of food with every meal request which Patient is physically not capable of consuming turned out to be to take it home. Likely very limited finances so any money that can be saved counts (such as groceries).

## 2022-06-14 NOTE — BH CONSULTATION LIAISON PROGRESS NOTE - CURRENT MEDICATION
MEDICATIONS  (STANDING):  bisacodyl 5 milliGRAM(s) Oral at bedtime  cyclobenzaprine 5 milliGRAM(s) Oral three times a day  folic acid 1 milliGRAM(s) Oral daily  gabapentin 300 milliGRAM(s) Oral at bedtime  heparin   Injectable 5000 Unit(s) SubCutaneous every 8 hours  pancrelipase  (CREON  6,000 Lipase Units) 2 Capsule(s) Oral three times a day with meals  pantoprazole    Tablet 40 milliGRAM(s) Oral before breakfast  polyethylene glycol 3350 17 Gram(s) Oral two times a day  senna 2 Tablet(s) Oral at bedtime  sodium chloride 0.9%. 1000 milliLiter(s) (90 mL/Hr) IV Continuous <Continuous>  thiamine 100 milliGRAM(s) Oral daily    MEDICATIONS  (PRN):  acetaminophen     Tablet .. 650 milliGRAM(s) Oral every 6 hours PRN Temp greater or equal to 38C (100.4F), Mild Pain (1 - 3), Moderate Pain (4 - 6)  chlorproMAZINE    Tablet 25 milliGRAM(s) Oral every 8 hours PRN hiccups  oxyCODONE    IR 10 milliGRAM(s) Oral every 4 hours PRN Severe Pain (7 - 10)

## 2022-06-14 NOTE — DISCHARGE NOTE PROVIDER - PROVIDER TOKENS
FREE:[LAST:[gastroenterologist],PHONE:[(   )    -],FAX:[(   )    -],FOLLOWUP:[1 week]],FREE:[LAST:[PCP],PHONE:[(   )    -],FAX:[(   )    -],FOLLOWUP:[1 week]]

## 2022-06-14 NOTE — DISCHARGE NOTE PROVIDER - NSDCFUSCHEDAPPT_GEN_ALL_CORE_FT
Lamberto Hurtado  [facility]  INTMED OP [id number]  Scheduled Appointment: 06/15/2022    [facility]  Gastro OP [id number]  Scheduled Appointment: 06/23/2022    Cirilo Edwards  [facility]  [facility] Amb Surg Endoscopy  Scheduled Appointment: 06/29/2022    Gene Fraga  [facility]  NEPHRO OP [id number]  Scheduled Appointment: 07/20/2022

## 2022-06-14 NOTE — BH CONSULTATION LIAISON PROGRESS NOTE - NSBHFUPINTERVALHXFT_PSY_A_CORE
Patient continued to order meals, snacks - turns out patient has been putting it together to take home. In a way, trying to save on groceries. EXAM: comfortable today, not in any physical distress, calm, cooperative. Euthymic mood, no overt psychosis - is concrete with some impaired reasoning but likely baseline. Denies suicidality.

## 2022-06-14 NOTE — DISCHARGE NOTE PROVIDER - NSDCMRMEDTOKEN_GEN_ALL_CORE_FT
bisacodyl 5 mg oral delayed release tablet: 1 tab(s) orally once a day (at bedtime)  chlorproMAZINE 10 mg oral tablet: 1 tab(s) orally every 8 hours, As needed, hiccuping MDD:30mg  Creon 6000 units oral delayed release capsule: 2 cap(s) orally 3 times a day  cyclobenzaprine 5 mg oral tablet: 1 tab(s) orally 3 times a day, As needed, hiccuping MDD:15mg  folic acid 1 mg oral tablet: 1 tab(s) orally once a day  gabapentin 300 mg oral capsule: 1 cap(s) orally once a day (at bedtime)  MiraLax oral powder for reconstitution: 17 gram(s) orally 2 times a day  pantoprazole 40 mg oral delayed release tablet: 1 tab(s) orally 2 times a day  Percocet 10/325 oral tablet: 1 tab(s) orally every 4 hours, As Needed  senna oral tablet: 2 tab(s) orally once a day (at bedtime)  thiamine 100 mg oral tablet: 1 tab(s) orally once a day

## 2022-06-14 NOTE — DISCHARGE NOTE NURSING/CASE MANAGEMENT/SOCIAL WORK - PATIENT PORTAL LINK FT
You can access the FollowMyHealth Patient Portal offered by [facility] by registering at the following website: [email or web address]. By joining [employer]’s FollowMyHealth portal, you will also be able to view your health information using other applications (apps) compatible with our system.

## 2022-06-14 NOTE — PROGRESS NOTE ADULT - SUBJECTIVE AND OBJECTIVE BOX
Patient is a 61y old  Male who presents with a chief complaint of Epigastric Pain (09 Jun 2022 20:48)    HPI:  Patient well known to me from [facility].    61M with PMHx of chronic pancreatitis (complicated by chronic pain of which secondary to alcohol abuse), CKD4, and esophageal strictures (post-stent) presenting for acute-on-chronic epigastric pain for several days. Patient recently admitted to [facility] twice for hematemesis (with stent migration requiring balloon dilation of stricture and suturing of stent) and epigastric pain with admission for pain control with known pancreatic duct stone, but unable to perform ERCP due to stricture. After he was discharged on May 10 he saw his PMD approximately one week prior who agreed to increase his Percocet  to q4 hours PRN due to poorly controlled pain. He has pending outpatient GI and pain management follow ups. He states he has been taking about 8 Percocets per day due to poorly controlled pain. He states it is constant, severe and located in epigastric region. Denies diarrhea, constipation, nausea, vomiting or decrease PO intake. He came in to get more pain medications. CT A&P showing diffuse pancreatic calcifications with stable dilated pancreatic duct measuring 1.4 cm. Patient given 1 L NS and morphine 4 mg IV. He denies taking any other medications for pain. Denies any recent alcohol use. (09 Jun 2022 20:48)    SUBJECTIVE & OBJECTIVE: Pt seen and examined at bedside. Still complaining of abdominal pain   PHYSICAL EXAM:  T(C): 36.8 (06-10-22 @ 11:57), Max: 37.1 (06-09-22 @ 22:32)  HR: 90 (06-10-22 @ 11:57) (77 - 91)  BP: 112/66 (06-10-22 @ 11:57) (103/66 - 125/74)  RR: 17 (06-10-22 @ 11:57) (16 - 20)  SpO2: 98% (06-10-22 @ 11:57) (98% - 100%) Daily     Daily I&O's Detail    GENERAL: NAD, well-groomed, well-developed  HEAD:  Atraumatic, Normocephalic  EYES: EOMI, PERRLA, conjunctiva and sclera clear  ENMT: Moist mucous membranes  NECK: Supple, No JVD  NERVOUS SYSTEM:  Alert & Oriented X3, Motor Strength 5/5 B/L upper and lower extremities; DTRs 2+ intact and symmetric  CHEST/LUNG: Clear to auscultation bilaterally; No rales, rhonchi, wheezing, or rubs  HEART: Regular rate and rhythm; No murmurs, rubs, or gallops  ABDOMEN: tender to palpation in epigastrium   EXTREMITIES:  2+ Peripheral Pulses, No clubbing, cyanosis, or edema  LABS:                        9.7    10.39 )-----------( 260      ( 10 Cesar 2022 06:48 )             32.0   CAPILLARY BLOOD GLUCOSE      ABG - ( 09 Jun 2022 18:20 )  pH, Arterial: 7.58  pH, Blood: x     /  pCO2: 50    /  pO2: 71    / HCO3: 47    / Base Excess: 21.8  /  SaO2: 97.3              RECENT CULTURES:   RADIOLOGY & ADDITIONAL TESTS:  
Patient is a 61y old  Male who presents with a chief complaint of INTRACTABLE ABDOMINAL PAIN     (2022 13:31)    HPI:  Patient well known to me from [facility].    61M with PMHx of chronic pancreatitis (complicated by chronic pain of which secondary to alcohol abuse), CKD4, and esophageal strictures (post-stent) presenting for acute-on-chronic epigastric pain for several days. Patient recently admitted to [facility] twice for hematemesis (with stent migration requiring balloon dilation of stricture and suturing of stent) and epigastric pain with admission for pain control with known pancreatic duct stone, but unable to perform ERCP due to stricture. After he was discharged on May 10 he saw his PMD approximately one week prior who agreed to increase his Percocet  to q4 hours PRN due to poorly controlled pain. He has pending outpatient GI and pain management follow ups. He states he has been taking about 8 Percocets per day due to poorly controlled pain. He states it is constant, severe and located in epigastric region. Denies diarrhea, constipation, nausea, vomiting or decrease PO intake. He came in to get more pain medications. CT A&P showing diffuse pancreatic calcifications with stable dilated pancreatic duct measuring 1.4 cm. Patient given 1 L NS and morphine 4 mg IV. He denies taking any other medications for pain. Denies any recent alcohol use. (2022 20:48)    SUBJECTIVE & OBJECTIVE: Pt seen and examined at bedside.   PHYSICAL EXAM:  T(C): 36.8 (22 @ 16:00), Max: 36.8 (22 @ 05:41)  HR: 81 (22 @ 16:00) (81 - 91)  BP: 136/75 (22 @ 16:00) (102/66 - 138/80)  RR: 18 (22 @ 16:00) (18 - 18)  SpO2: 99% (22 @ 16:00) (98% - 99%) Daily Height in cm: 185.42 (2022 14:45)    Daily I&O's Detail    10 Cesar 2022 07:01  -  2022 07:00  --------------------------------------------------------  IN:  Total IN: 0 mL    OUT:    Voided (mL): 500 mL  Total OUT: 500 mL    Total NET: -500 mL        GENERAL: NAD, well-groomed, well-developed  HEAD:  Atraumatic, Normocephalic  EYES: EOMI, PERRLA, conjunctiva and sclera clear  ENMT: Moist mucous membranes  NECK: Supple, No JVD  NERVOUS SYSTEM:  Alert & Oriented X3, Motor Strength 5/5 B/L upper and lower extremities; DTRs 2+ intact and symmetric  CHEST/LUNG: Clear to auscultation bilaterally; No rales, rhonchi, wheezing, or rubs  HEART: Regular rate and rhythm; No murmurs, rubs, or gallops  ABDOMEN: Soft, Nontender, Nondistended; Bowel sounds present  EXTREMITIES:  2+ Peripheral Pulses, No clubbing, cyanosis, or edema  LABS:                        8.8    9.29  )-----------( 236      ( 2022 07:45 )             28.3   Urinalysis Basic - ( 2022 08:59 )    Color: Yellow / Appearance: Clear / S.015 / pH: x  Gluc: x / Ketone: Negative  / Bili: Negative / Urobili: Negative mg/dL   Blood: x / Protein: 30 mg/dL / Nitrite: Negative   Leuk Esterase: Negative / RBC: 0-2 /HPF / WBC 0-2   Sq Epi: x / Non Sq Epi: Few / Bacteria: Few    CAPILLARY BLOOD GLUCOSE        RECENT CULTURES:   RADIOLOGY & ADDITIONAL TESTS:  
Appreciate consult by Dr Ceballos  Pt stable - no c/o abd ain  No c/o dysphagia - pt wants to eat double portions  s/p esophageal stent   +ETOH abuse      MEDICATIONS  (STANDING):  bisacodyl 5 milliGRAM(s) Oral at bedtime  folic acid 1 milliGRAM(s) Oral daily  heparin   Injectable 5000 Unit(s) SubCutaneous every 8 hours  pancrelipase  (CREON  6,000 Lipase Units) 2 Capsule(s) Oral three times a day with meals  pantoprazole    Tablet 40 milliGRAM(s) Oral before breakfast  polyethylene glycol 3350 17 Gram(s) Oral two times a day  senna 2 Tablet(s) Oral at bedtime  sodium chloride 0.9%. 1000 milliLiter(s) (90 mL/Hr) IV Continuous <Continuous>  thiamine 100 milliGRAM(s) Oral daily    MEDICATIONS  (PRN):  acetaminophen     Tablet .. 650 milliGRAM(s) Oral every 6 hours PRN Temp greater or equal to 38C (100.4F), Mild Pain (1 - 3), Moderate Pain (4 - 6)  chlorproMAZINE    Tablet 25 milliGRAM(s) Oral every 8 hours PRN hiccups  cyclobenzaprine 5 milliGRAM(s) Oral three times a day PRN Muscle Spasm  oxyCODONE    IR 10 milliGRAM(s) Oral every 4 hours PRN Severe Pain (7 - 10)      Allergies    No Known Allergies    Intolerances        Vital Signs Last 24 Hrs  T(C): 36.9 (13 Jun 2022 11:27), Max: 36.9 (13 Jun 2022 05:12)  T(F): 98.5 (13 Jun 2022 11:27), Max: 98.5 (13 Jun 2022 05:12)  HR: 83 (13 Jun 2022 11:27) (83 - 90)  BP: 123/75 (13 Jun 2022 11:27) (95/58 - 123/75)  BP(mean): --  RR: 18 (13 Jun 2022 11:27) (18 - 20)  SpO2: 100% (13 Jun 2022 11:27) (99% - 100%)    PHYSICAL EXAM:  General: NAD.  CVS: S1, S2  Chest: air entry bilaterally present  Abd: BS present, soft, non-tender      LABS:                        8.7    7.46  )-----------( 231      ( 13 Jun 2022 08:02 )             27.7     06-13    140  |  101  |  42<H>  ----------------------------<  85  4.5   |  35<H>  |  2.35<H>    Ca    8.8      13 Jun 2022 08:02    TPro  5.4<L>  /  Alb  2.3<L>  /  TBili  0.2  /  DBili  x   /  AST  13<L>  /  ALT  15  /  AlkPhos  62  06-13      diet as tolerated  continue protonix  suggest outpatient f/u with GI who placed esophageal stent      
Patient is a 61y old  Male who presents with a chief complaint of INTRACTABLE ABDOMINAL PAIN     (2022 13:31)    INTERVAL HPI/OVERNIGHT EVENTS: no events     MEDICATIONS  (STANDING):  bisacodyl 5 milliGRAM(s) Oral at bedtime  folic acid 1 milliGRAM(s) Oral daily  heparin   Injectable 5000 Unit(s) SubCutaneous every 8 hours  pancrelipase  (CREON  6,000 Lipase Units) 2 Capsule(s) Oral three times a day with meals  pantoprazole    Tablet 40 milliGRAM(s) Oral before breakfast  polyethylene glycol 3350 17 Gram(s) Oral two times a day  senna 2 Tablet(s) Oral at bedtime  sodium chloride 0.9%. 1000 milliLiter(s) (90 mL/Hr) IV Continuous <Continuous>  thiamine 100 milliGRAM(s) Oral daily    MEDICATIONS  (PRN):  acetaminophen     Tablet .. 650 milliGRAM(s) Oral every 6 hours PRN Temp greater or equal to 38C (100.4F), Mild Pain (1 - 3), Moderate Pain (4 - 6)  chlorproMAZINE    Tablet 25 milliGRAM(s) Oral every 8 hours PRN hiccups  cyclobenzaprine 5 milliGRAM(s) Oral three times a day PRN Muscle Spasm  oxyCODONE    IR 10 milliGRAM(s) Oral every 4 hours PRN Severe Pain (7 - 10)    Allergies    No Known Allergies    Intolerances      REVIEW OF SYSTEMS:  All other systems reviewed and are negative    Vital Signs Last 24 Hrs  T(C): 36.8 (2022 05:36), Max: 36.8 (2022 16:00)  T(F): 98.2 (2022 05:36), Max: 98.2 (2022 16:00)  HR: 85 (2022 05:36) (80 - 85)  BP: 113/67 (2022 05:36) (113/67 - 136/75)  BP(mean): --  RR: 18 (2022 05:36) (18 - 18)  SpO2: 100% (2022 05:36) (98% - 100%)  Daily Height in cm: 185.42 (2022 14:45)    Daily   I&O's Summary    2022 07:01  -  2022 07:00  --------------------------------------------------------  IN: 0 mL / OUT: 250 mL / NET: -250 mL      CAPILLARY BLOOD GLUCOSE        PHYSICAL EXAM:  GENERAL: NAD,    HEAD:  Atraumatic, Normocephalic  EYES: EOMI, PERRLA, conjunctiva and sclera clear  ENMT: No tonsillar erythema, exudates, or enlargement; Moist mucous membranes, Good dentition, No lesions  NECK: Supple, No JVD, Normal thyroid  NERVOUS SYSTEM:  Alert & Oriented X3, Good concentration; Motor Strength 5/5 B/L upper and lower extremities; DTRs 2+ intact and symmetric  CHEST/LUNG: Clear to percussion bilaterally; No rales, rhonchi, wheezing, or rubs  HEART: Regular rate and rhythm; No murmurs, rubs, or gallops  ABDOMEN: Soft, Nontender, Nondistended; Bowel sounds present  EXTREMITIES:  2+ Peripheral Pulses, No clubbing, cyanosis, or edema  LYMPH: No lymphadenopathy noted  SKIN: No rashes or lesions    Labs                          8.8    9.29  )-----------( 236      ( 2022 07:45 )             28.3         140  |  100  |  44<H>  ----------------------------<  80  3.3<L>   |  36<H>  |  2.48<H>    Ca    8.4<L>      2022 07:45  Phos  2.0         TPro  5.6<L>  /  Alb  2.3<L>  /  TBili  0.1<L>  /  DBili  x   /  AST  16  /  ALT  17  /  AlkPhos  67            Urinalysis Basic - ( 2022 08:59 )    Color: Yellow / Appearance: Clear / S.015 / pH: x  Gluc: x / Ketone: Negative  / Bili: Negative / Urobili: Negative mg/dL   Blood: x / Protein: 30 mg/dL / Nitrite: Negative   Leuk Esterase: Negative / RBC: 0-2 /HPF / WBC 0-2   Sq Epi: x / Non Sq Epi: Few / Bacteria: Few                  DVT prophylaxis: > Lovenox 40mg SQ daily  > Heparin   > SCD's
Pt stable  tolerating diet      MEDICATIONS  (STANDING):  bisacodyl 5 milliGRAM(s) Oral at bedtime  cyclobenzaprine 5 milliGRAM(s) Oral three times a day  folic acid 1 milliGRAM(s) Oral daily  gabapentin 300 milliGRAM(s) Oral at bedtime  heparin   Injectable 5000 Unit(s) SubCutaneous every 8 hours  pancrelipase  (CREON  6,000 Lipase Units) 2 Capsule(s) Oral three times a day with meals  pantoprazole    Tablet 40 milliGRAM(s) Oral before breakfast  polyethylene glycol 3350 17 Gram(s) Oral two times a day  senna 2 Tablet(s) Oral at bedtime  sodium chloride 0.9%. 1000 milliLiter(s) (90 mL/Hr) IV Continuous <Continuous>  thiamine 100 milliGRAM(s) Oral daily    MEDICATIONS  (PRN):  acetaminophen     Tablet .. 650 milliGRAM(s) Oral every 6 hours PRN Temp greater or equal to 38C (100.4F), Mild Pain (1 - 3), Moderate Pain (4 - 6)  chlorproMAZINE    Tablet 25 milliGRAM(s) Oral every 8 hours PRN hiccups  oxyCODONE    IR 10 milliGRAM(s) Oral every 4 hours PRN Severe Pain (7 - 10)      Allergies    No Known Allergies    Intolerances        Vital Signs Last 24 Hrs  T(C): 36.9 (14 Jun 2022 16:22), Max: 36.9 (14 Jun 2022 16:22)  T(F): 98.4 (14 Jun 2022 16:22), Max: 98.4 (14 Jun 2022 16:22)  HR: 82 (14 Jun 2022 16:22) (79 - 104)  BP: 137/76 (14 Jun 2022 16:22) (110/64 - 145/82)  BP(mean): --  RR: 21 (14 Jun 2022 16:22) (17 - 22)  SpO2: 100% (14 Jun 2022 16:22) (100% - 100%)    PHYSICAL EXAM:  General: NAD.  CVS: S1, S2  Chest: air entry bilaterally present  Abd: BS present, soft, non-tender      LABS:                        8.7    7.46  )-----------( 231      ( 13 Jun 2022 08:02 )             27.7     06-13    140  |  101  |  42<H>  ----------------------------<  85  4.5   |  35<H>  |  2.35<H>    Ca    8.8      13 Jun 2022 08:02    TPro  5.4<L>  /  Alb  2.3<L>  /  TBili  0.2  /  DBili  x   /  AST  13<L>  /  ALT  15  /  AlkPhos  62  06-13        continue protonix and creon  Pt instructed to f/u with GI at [facility] (physician who placed esophageal stent)  agree with d/c plan

## 2022-06-14 NOTE — DISCHARGE NOTE PROVIDER - HOSPITAL COURSE
61M with PMHx of chronic pancreatitis (complicated by chronic pain of which secondary to alcohol abuse), CKD4, and esophageal strictures (post-stent) presenting for acute-on-chronic epigastric pain for several days. This is in the setting of running out of Percocet. CT A&P showing diffuse pancreatic calcifications with stable dilated pancreatic duct measuring 1.4 cm. Suspect patient's pain is due to chronic pancreatitis with no new process.    Hx:  HCV s/p SVR, and esophageal stricture s/p stent placement 4/19/2022 c/b need for reposition & suture 4/29/2022   Problem/Plan - 1:  ·  Problem: Chronic pancreatitis.   ·  Plan: Patient with known pancreatic duct retained stone and unable to perform ERCP. Pain could be from this or chronic pancreatitis with poor pain management as outpatient.  - lipase is down trending  - still complaining of pain with meals,however, eating full meals  - adjusting pain medications   -GI recommend outpatient follow up    Problem/Plan - 2:  ·  Problem: Metabolic acidosis with respiratory alkalosis.   ·  Plan: c/w NS  - continue to monitor    Problem/Plan - 3:  ·  Problem: Stage 4 chronic kidney disease.   ·  Plan: Cr at baseline  Monitor renal function & renally dose all medications.    Problem/Plan - 4:  ·  Problem: Esophageal stricture.   ·  Plan: Continue with Protonix BID.    Problem/Plan - 5:  ·  Problem: Normocytic anemia.   ·  Plan: Likely AOCD  Monitor Hg.    cleared for discharge per Dr. Keller.

## 2022-06-14 NOTE — DISCHARGE NOTE PROVIDER - DETAILS OF MALNUTRITION DIAGNOSIS/DIAGNOSES
This patient has been assessed with a concern for Malnutrition and was treated during this hospitalization for the following Nutrition diagnosis/diagnoses:     -  06/11/2022: Severe protein-calorie malnutrition   -  06/11/2022: Underweight (BMI < 19)

## 2022-06-14 NOTE — BH CONSULTATION LIAISON PROGRESS NOTE - NSBHCHARTREVIEWINVESTIGATE_PSY_A_CORE FT
CT Abdomen and Pelvis No Cont (06.09.22 @ 19:01) Evidence of chronic pancreatitis, stable. No bowel obstruction.

## 2022-06-14 NOTE — DISCHARGE NOTE PROVIDER - CARE PROVIDER_API CALL
gastroenterologist,   Phone: (   )    -  Fax: (   )    -  Follow Up Time: 1 week    PCP,   Phone: (   )    -  Fax: (   )    -  Follow Up Time: 1 week

## 2022-06-14 NOTE — DISCHARGE NOTE NURSING/CASE MANAGEMENT/SOCIAL WORK - NSDCPEFALRISK_GEN_ALL_CORE
For information on Fall & Injury Prevention, visit: [email or web address] OR  [email or web address] OR  https://www.cdc.gov/steadi/patient.html

## 2022-06-14 NOTE — BH CONSULTATION LIAISON PROGRESS NOTE - NSBHCHARTREVIEWVS_PSY_A_CORE FT
Vital Signs Last 24 Hrs  T(C): 36.8 (14 Jun 2022 10:52), Max: 36.8 (13 Jun 2022 23:34)  T(F): 98.2 (14 Jun 2022 10:52), Max: 98.2 (13 Jun 2022 23:34)  HR: 79 (14 Jun 2022 10:52) (79 - 104)  BP: 121/74 (14 Jun 2022 10:52) (110/64 - 145/82)  BP(mean): --  RR: 22 (14 Jun 2022 10:52) (17 - 22)  SpO2: 100% (14 Jun 2022 10:52) (100% - 100%)

## 2022-06-14 NOTE — DISCHARGE NOTE PROVIDER - NSDCCPCAREPLAN_GEN_ALL_CORE_FT
PRINCIPAL DISCHARGE DIAGNOSIS  Diagnosis: Intractable abdominal pain  Assessment and Plan of Treatment: Patient with known pancreatic duct retained stone and unable to perform ERCP. Pain could be from this or chronic pancreatitis with poor pain management as outpatient.  - lipase is down trending  - still complaining of pain with meals,however, eating full meals  - adjusting pain medications   -GI recommend outpatient follow up  -[facility]      SECONDARY DISCHARGE DIAGNOSES  Diagnosis: Stage 4 chronic kidney disease  Assessment and Plan of Treatment: stable    Diagnosis: Esophageal stricture  Assessment and Plan of Treatment: protonix twice a day    Diagnosis: Normocytic anemia  Assessment and Plan of Treatment: follow up with primary care doctor    Diagnosis: Chronic pancreatitis  Assessment and Plan of Treatment: as above

## 2022-06-15 ENCOUNTER — APPOINTMENT (OUTPATIENT)
Dept: INTERNAL MEDICINE | Facility: CLINIC | Age: 62
End: 2022-06-15

## 2022-06-20 DIAGNOSIS — M10.9 GOUT, UNSPECIFIED: ICD-10-CM

## 2022-06-20 DIAGNOSIS — D64.9 ANEMIA, UNSPECIFIED: ICD-10-CM

## 2022-06-20 DIAGNOSIS — Z20.822 CONTACT WITH AND (SUSPECTED) EXPOSURE TO COVID-19: ICD-10-CM

## 2022-06-20 DIAGNOSIS — E87.2 ACIDOSIS: ICD-10-CM

## 2022-06-20 DIAGNOSIS — I12.9 HYPERTENSIVE CHRONIC KIDNEY DISEASE WITH STAGE 1 THROUGH STAGE 4 CHRONIC KIDNEY DISEASE, OR UNSPECIFIED CHRONIC KIDNEY DISEASE: ICD-10-CM

## 2022-06-20 DIAGNOSIS — G89.4 CHRONIC PAIN SYNDROME: ICD-10-CM

## 2022-06-20 DIAGNOSIS — Z82.49 FAMILY HISTORY OF ISCHEMIC HEART DISEASE AND OTHER DISEASES OF THE CIRCULATORY SYSTEM: ICD-10-CM

## 2022-06-20 DIAGNOSIS — K22.2 ESOPHAGEAL OBSTRUCTION: ICD-10-CM

## 2022-06-20 DIAGNOSIS — E43 UNSPECIFIED SEVERE PROTEIN-CALORIE MALNUTRITION: ICD-10-CM

## 2022-06-20 DIAGNOSIS — K86.1 OTHER CHRONIC PANCREATITIS: ICD-10-CM

## 2022-06-20 DIAGNOSIS — N18.4 CHRONIC KIDNEY DISEASE, STAGE 4 (SEVERE): ICD-10-CM

## 2022-06-20 DIAGNOSIS — E87.6 HYPOKALEMIA: ICD-10-CM

## 2022-06-20 DIAGNOSIS — E87.3 ALKALOSIS: ICD-10-CM

## 2022-06-20 DIAGNOSIS — K86.89 OTHER SPECIFIED DISEASES OF PANCREAS: ICD-10-CM

## 2022-06-21 ENCOUNTER — EMERGENCY (EMERGENCY)
Facility: HOSPITAL | Age: 62
LOS: 0 days | Discharge: HOME | End: 2022-06-22
Attending: EMERGENCY MEDICINE
Payer: COMMERCIAL

## 2022-06-21 VITALS
WEIGHT: 126.99 LBS | TEMPERATURE: 99 F | HEIGHT: 73 IN | OXYGEN SATURATION: 100 % | DIASTOLIC BLOOD PRESSURE: 80 MMHG | RESPIRATION RATE: 17 BRPM | SYSTOLIC BLOOD PRESSURE: 116 MMHG | HEART RATE: 87 BPM

## 2022-06-21 DIAGNOSIS — G89.29 OTHER CHRONIC PAIN: ICD-10-CM

## 2022-06-21 DIAGNOSIS — R10.9 UNSPECIFIED ABDOMINAL PAIN: ICD-10-CM

## 2022-06-21 DIAGNOSIS — I12.9 HYPERTENSIVE CHRONIC KIDNEY DISEASE WITH STAGE 1 THROUGH STAGE 4 CHRONIC KIDNEY DISEASE, OR UNSPECIFIED CHRONIC KIDNEY DISEASE: ICD-10-CM

## 2022-06-21 DIAGNOSIS — M10.9 GOUT, UNSPECIFIED: ICD-10-CM

## 2022-06-21 DIAGNOSIS — K25.5 CHRONIC OR UNSPECIFIED GASTRIC ULCER WITH PERFORATION: Chronic | ICD-10-CM

## 2022-06-21 DIAGNOSIS — N18.9 CHRONIC KIDNEY DISEASE, UNSPECIFIED: ICD-10-CM

## 2022-06-21 DIAGNOSIS — Z87.19 PERSONAL HISTORY OF OTHER DISEASES OF THE DIGESTIVE SYSTEM: ICD-10-CM

## 2022-06-21 PROCEDURE — 99284 EMERGENCY DEPT VISIT MOD MDM: CPT

## 2022-06-21 RX ORDER — OXYCODONE HYDROCHLORIDE 5 MG/1
10 TABLET ORAL ONCE
Refills: 0 | Status: DISCONTINUED | OUTPATIENT
Start: 2022-06-21 | End: 2022-06-21

## 2022-06-21 RX ORDER — SODIUM CHLORIDE 9 MG/ML
1000 INJECTION INTRAMUSCULAR; INTRAVENOUS; SUBCUTANEOUS ONCE
Refills: 0 | Status: COMPLETED | OUTPATIENT
Start: 2022-06-21 | End: 2022-06-21

## 2022-06-21 RX ADMIN — OXYCODONE HYDROCHLORIDE 10 MILLIGRAM(S): 5 TABLET ORAL at 15:30

## 2022-06-21 RX ADMIN — OXYCODONE HYDROCHLORIDE 10 MILLIGRAM(S): 5 TABLET ORAL at 18:37

## 2022-06-21 RX ADMIN — OXYCODONE HYDROCHLORIDE 10 MILLIGRAM(S): 5 TABLET ORAL at 19:05

## 2022-06-21 RX ADMIN — SODIUM CHLORIDE 1000 MILLILITER(S): 9 INJECTION INTRAMUSCULAR; INTRAVENOUS; SUBCUTANEOUS at 15:32

## 2022-06-21 NOTE — ED ADULT TRIAGE NOTE - CHIEF COMPLAINT QUOTE
pt a&o x4 pt c.o of abd pain , pmh of pancreatitis. pt picked up by ems at pharmacy where he was unable to  his prescription and called 911. No n./v

## 2022-06-21 NOTE — ED PROVIDER NOTE - PATIENT PORTAL LINK FT
You can access the FollowMyHealth Patient Portal offered by [facility] by registering at the following website: [email or web address]. By joining [employer]’s FollowMyHealth portal, you will also be able to view your health information using other applications (apps) compatible with our system.

## 2022-06-21 NOTE — ED ADULT NURSE NOTE - HISTORY OF COVID-19 VACCINATION
Saw Dr. Davis yesterday, pt was told to come in to ED and and Dr. Davis would see him.   Vaccine status unknown

## 2022-06-21 NOTE — ED ADULT NURSE NOTE - OBJECTIVE STATEMENT
pt biba  from pharmacy, pt c/o unable to  pain medication, because his MD didn't sent it. pt states he here to get medicated for abd pain. h/o pancreatitis. states he doesn't want oxycodone, he wants morphine now for his pain.

## 2022-06-21 NOTE — ED ADULT TRIAGE NOTE - RESPIRATORY RATE (BREATHS/MIN)
Patient called in stating that she has symptoms below:    Productive cough, yellow sputum   Congestion  Sore throat    Requesting to see Dr. Duy Nevarez or medication sent in. CSS transferred to triage. 17

## 2022-06-21 NOTE — ED ADULT NURSE NOTE - ED STAT RN HANDOFF DETAILS
Report received from [facility] Meneses. Safety checks completed this shift. Safety rounds completed hourly.  IV taken out. Fall & skin precautions in place. Will continue to monitor. pending ambulance .

## 2022-06-21 NOTE — ED ADULT NURSE NOTE - NSIMPLEMENTINTERV_GEN_ALL_ED
Implemented All Universal Safety Interventions:  [place] to call system. Call bell, personal items and telephone within reach. Instruct patient to call for assistance. Room bathroom lighting operational. Non-slip footwear when patient is off stretcher. Physically safe environment: no spills, clutter or unnecessary equipment. Stretcher in lowest position, wheels locked, appropriate side rails in place.

## 2022-06-21 NOTE — ED ADULT NURSE REASSESSMENT NOTE - NS ED NURSE REASSESS COMMENT FT1
Pt for discharge. Pt refused stating he has too many personal belongings, left his walker at the pharmacy, and wants pain medication to go home otherwise he's going to act up. Supervisor and management informed; patient will be staying for AM discharge to see SW. Pt medicated for c/o pain x2.

## 2022-06-21 NOTE — ED ADULT NURSE NOTE - NS ED NOTE ABUSE SUSPICION NEGLECT YN
Telephone call placed to pt daughter, Sherry Love. Spoke with Sherry Love to set up meeting to discuss palliative care services per Dr. Roger smith.   Sherry Love states that they are currently driving from their home in [place] to [place] and won't be at hospital until late evening o
No

## 2022-06-21 NOTE — ED PROVIDER NOTE - CLINICAL SUMMARY MEDICAL DECISION MAKING FREE TEXT BOX
DDx: chronic pancreatitis, no abdominal tenderness or guarding to suggest surgical abdomen.   Plan: Labs, pain control, and reassess.

## 2022-06-21 NOTE — ED PROVIDER NOTE - PROGRESS NOTE DETAILS
After receiving pain medications, pt declines labs, and wishes to leave. Is comfortable, and tolerating po, ready for d/c. Will  pain medications tomorrow.

## 2022-06-21 NOTE — ED PROVIDER NOTE - OBJECTIVE STATEMENT
61 year old male with PMH of EtOH abuse, chronic pancreatitis, CKD, and esophageal stricture who presents to the ED after being unable to pickup his pain medication. Pt was at his pharmacy waiting to pickup his pain medication of Oxycodone 10 mg, however the pharmacy said the prescription is not going to be ready until tomorrow so he came to the ED. No vomiting, diarrhea, nausea, and CP.

## 2022-06-22 VITALS
DIASTOLIC BLOOD PRESSURE: 88 MMHG | SYSTOLIC BLOOD PRESSURE: 148 MMHG | HEART RATE: 80 BPM | RESPIRATION RATE: 16 BRPM | OXYGEN SATURATION: 99 % | TEMPERATURE: 98 F

## 2022-06-22 RX ORDER — OXYCODONE AND ACETAMINOPHEN 5; 325 MG/1; MG/1
1 TABLET ORAL ONCE
Refills: 0 | Status: DISCONTINUED | OUTPATIENT
Start: 2022-06-22 | End: 2022-06-22

## 2022-06-22 RX ADMIN — OXYCODONE AND ACETAMINOPHEN 1 TABLET(S): 5; 325 TABLET ORAL at 00:12

## 2022-06-23 ENCOUNTER — APPOINTMENT (OUTPATIENT)
Dept: GASTROENTEROLOGY | Facility: CLINIC | Age: 62
End: 2022-06-23

## 2022-06-29 ENCOUNTER — APPOINTMENT (OUTPATIENT)
Dept: GASTROENTEROLOGY | Facility: HOSPITAL | Age: 62
End: 2022-06-29

## 2022-07-06 ENCOUNTER — INPATIENT (INPATIENT)
Facility: HOSPITAL | Age: 62
LOS: 18 days | Discharge: HOME | End: 2022-07-25
Attending: SURGERY | Admitting: SURGERY

## 2022-07-06 VITALS
TEMPERATURE: 98 F | HEART RATE: 70 BPM | RESPIRATION RATE: 18 BRPM | OXYGEN SATURATION: 100 % | SYSTOLIC BLOOD PRESSURE: 117 MMHG | DIASTOLIC BLOOD PRESSURE: 67 MMHG | HEIGHT: 73 IN

## 2022-07-06 DIAGNOSIS — N17.9 ACUTE KIDNEY FAILURE, UNSPECIFIED: ICD-10-CM

## 2022-07-06 DIAGNOSIS — Z29.9 ENCOUNTER FOR PROPHYLACTIC MEASURES, UNSPECIFIED: ICD-10-CM

## 2022-07-06 DIAGNOSIS — K31.1 ADULT HYPERTROPHIC PYLORIC STENOSIS: ICD-10-CM

## 2022-07-06 DIAGNOSIS — D64.9 ANEMIA, UNSPECIFIED: ICD-10-CM

## 2022-07-06 DIAGNOSIS — R10.9 UNSPECIFIED ABDOMINAL PAIN: ICD-10-CM

## 2022-07-06 DIAGNOSIS — K86.1 OTHER CHRONIC PANCREATITIS: ICD-10-CM

## 2022-07-06 DIAGNOSIS — K22.2 ESOPHAGEAL OBSTRUCTION: ICD-10-CM

## 2022-07-06 DIAGNOSIS — K25.5 CHRONIC OR UNSPECIFIED GASTRIC ULCER WITH PERFORATION: Chronic | ICD-10-CM

## 2022-07-06 LAB
ALBUMIN SERPL ELPH-MCNC: 3.6 G/DL — SIGNIFICANT CHANGE UP (ref 3.3–5)
ALP SERPL-CCNC: 85 U/L — SIGNIFICANT CHANGE UP (ref 40–120)
ALT FLD-CCNC: 12 U/L — SIGNIFICANT CHANGE UP (ref 4–41)
ANION GAP SERPL CALC-SCNC: 16 MMOL/L — HIGH (ref 7–14)
AST SERPL-CCNC: 16 U/L — SIGNIFICANT CHANGE UP (ref 4–40)
B PERT DNA SPEC QL NAA+PROBE: SIGNIFICANT CHANGE UP
B PERT+PARAPERT DNA PNL SPEC NAA+PROBE: SIGNIFICANT CHANGE UP
BASOPHILS # BLD AUTO: 0.1 K/UL — SIGNIFICANT CHANGE UP (ref 0–0.2)
BASOPHILS NFR BLD AUTO: 1.1 % — SIGNIFICANT CHANGE UP (ref 0–2)
BILIRUB SERPL-MCNC: 0.2 MG/DL — SIGNIFICANT CHANGE UP (ref 0.2–1.2)
BORDETELLA PARAPERTUSSIS (RAPRVP): SIGNIFICANT CHANGE UP
BUN SERPL-MCNC: 80 MG/DL — HIGH (ref 7–23)
C PNEUM DNA SPEC QL NAA+PROBE: SIGNIFICANT CHANGE UP
CALCIUM SERPL-MCNC: 9.1 MG/DL — SIGNIFICANT CHANGE UP (ref 8.4–10.5)
CHLORIDE SERPL-SCNC: 96 MMOL/L — LOW (ref 98–107)
CO2 SERPL-SCNC: 24 MMOL/L — SIGNIFICANT CHANGE UP (ref 22–31)
CREAT SERPL-MCNC: 3.64 MG/DL — HIGH (ref 0.5–1.3)
EGFR: 18 ML/MIN/1.73M2 — LOW
EOSINOPHIL # BLD AUTO: 0.19 K/UL — SIGNIFICANT CHANGE UP (ref 0–0.5)
EOSINOPHIL NFR BLD AUTO: 2.1 % — SIGNIFICANT CHANGE UP (ref 0–6)
FLUAV SUBTYP SPEC NAA+PROBE: SIGNIFICANT CHANGE UP
FLUBV RNA SPEC QL NAA+PROBE: SIGNIFICANT CHANGE UP
GLUCOSE SERPL-MCNC: 110 MG/DL — HIGH (ref 70–99)
HADV DNA SPEC QL NAA+PROBE: SIGNIFICANT CHANGE UP
HCOV 229E RNA SPEC QL NAA+PROBE: SIGNIFICANT CHANGE UP
HCOV HKU1 RNA SPEC QL NAA+PROBE: SIGNIFICANT CHANGE UP
HCOV NL63 RNA SPEC QL NAA+PROBE: SIGNIFICANT CHANGE UP
HCOV OC43 RNA SPEC QL NAA+PROBE: SIGNIFICANT CHANGE UP
HCT VFR BLD CALC: 31.3 % — LOW (ref 39–50)
HGB BLD-MCNC: 10.1 G/DL — LOW (ref 13–17)
HMPV RNA SPEC QL NAA+PROBE: SIGNIFICANT CHANGE UP
HPIV1 RNA SPEC QL NAA+PROBE: SIGNIFICANT CHANGE UP
HPIV2 RNA SPEC QL NAA+PROBE: SIGNIFICANT CHANGE UP
HPIV3 RNA SPEC QL NAA+PROBE: SIGNIFICANT CHANGE UP
HPIV4 RNA SPEC QL NAA+PROBE: SIGNIFICANT CHANGE UP
IANC: 6.08 K/UL — SIGNIFICANT CHANGE UP (ref 1.8–7.4)
IMM GRANULOCYTES NFR BLD AUTO: 0.3 % — SIGNIFICANT CHANGE UP (ref 0–1.5)
LIDOCAIN IGE QN: 14 U/L — SIGNIFICANT CHANGE UP (ref 7–60)
LYMPHOCYTES # BLD AUTO: 2.25 K/UL — SIGNIFICANT CHANGE UP (ref 1–3.3)
LYMPHOCYTES # BLD AUTO: 24.3 % — SIGNIFICANT CHANGE UP (ref 13–44)
M PNEUMO DNA SPEC QL NAA+PROBE: SIGNIFICANT CHANGE UP
MCHC RBC-ENTMCNC: 31.1 PG — SIGNIFICANT CHANGE UP (ref 27–34)
MCHC RBC-ENTMCNC: 32.3 GM/DL — SIGNIFICANT CHANGE UP (ref 32–36)
MCV RBC AUTO: 96.3 FL — SIGNIFICANT CHANGE UP (ref 80–100)
MONOCYTES # BLD AUTO: 0.61 K/UL — SIGNIFICANT CHANGE UP (ref 0–0.9)
MONOCYTES NFR BLD AUTO: 6.6 % — SIGNIFICANT CHANGE UP (ref 2–14)
NEUTROPHILS # BLD AUTO: 6.08 K/UL — SIGNIFICANT CHANGE UP (ref 1.8–7.4)
NEUTROPHILS NFR BLD AUTO: 65.6 % — SIGNIFICANT CHANGE UP (ref 43–77)
NRBC # BLD: 0 /100 WBCS — SIGNIFICANT CHANGE UP
NRBC # FLD: 0 K/UL — SIGNIFICANT CHANGE UP
PLATELET # BLD AUTO: 338 K/UL — SIGNIFICANT CHANGE UP (ref 150–400)
POTASSIUM SERPL-MCNC: 3.8 MMOL/L — SIGNIFICANT CHANGE UP (ref 3.5–5.3)
POTASSIUM SERPL-SCNC: 3.8 MMOL/L — SIGNIFICANT CHANGE UP (ref 3.5–5.3)
PROT SERPL-MCNC: 6.6 G/DL — SIGNIFICANT CHANGE UP (ref 6–8.3)
RAPID RVP RESULT: SIGNIFICANT CHANGE UP
RBC # BLD: 3.25 M/UL — LOW (ref 4.2–5.8)
RBC # FLD: 15.1 % — HIGH (ref 10.3–14.5)
RSV RNA SPEC QL NAA+PROBE: SIGNIFICANT CHANGE UP
RV+EV RNA SPEC QL NAA+PROBE: SIGNIFICANT CHANGE UP
SARS-COV-2 RNA SPEC QL NAA+PROBE: SIGNIFICANT CHANGE UP
SODIUM SERPL-SCNC: 136 MMOL/L — SIGNIFICANT CHANGE UP (ref 135–145)
WBC # BLD: 9.26 K/UL — SIGNIFICANT CHANGE UP (ref 3.8–10.5)
WBC # FLD AUTO: 9.26 K/UL — SIGNIFICANT CHANGE UP (ref 3.8–10.5)

## 2022-07-06 PROCEDURE — 99223 1ST HOSP IP/OBS HIGH 75: CPT

## 2022-07-06 PROCEDURE — 99285 EMERGENCY DEPT VISIT HI MDM: CPT

## 2022-07-06 PROCEDURE — 74176 CT ABD & PELVIS W/O CONTRAST: CPT | Mod: 26,MG

## 2022-07-06 PROCEDURE — G1004: CPT

## 2022-07-06 PROCEDURE — 99234 HOSP IP/OBS SM DT SF/LOW 45: CPT

## 2022-07-06 RX ORDER — HYDROMORPHONE HYDROCHLORIDE 2 MG/ML
0.5 INJECTION INTRAMUSCULAR; INTRAVENOUS; SUBCUTANEOUS EVERY 6 HOURS
Refills: 0 | Status: DISCONTINUED | OUTPATIENT
Start: 2022-07-06 | End: 2022-07-07

## 2022-07-06 RX ORDER — HYDROMORPHONE HYDROCHLORIDE 2 MG/ML
1 INJECTION INTRAMUSCULAR; INTRAVENOUS; SUBCUTANEOUS EVERY 6 HOURS
Refills: 0 | Status: DISCONTINUED | OUTPATIENT
Start: 2022-07-06 | End: 2022-07-07

## 2022-07-06 RX ORDER — PANTOPRAZOLE SODIUM 20 MG/1
40 TABLET, DELAYED RELEASE ORAL EVERY 12 HOURS
Refills: 0 | Status: DISCONTINUED | OUTPATIENT
Start: 2022-07-06 | End: 2022-07-06

## 2022-07-06 RX ORDER — HEPARIN SODIUM 5000 [USP'U]/ML
5000 INJECTION INTRAVENOUS; SUBCUTANEOUS EVERY 12 HOURS
Refills: 0 | Status: DISCONTINUED | OUTPATIENT
Start: 2022-07-06 | End: 2022-07-24

## 2022-07-06 RX ORDER — MORPHINE SULFATE 50 MG/1
4 CAPSULE, EXTENDED RELEASE ORAL ONCE
Refills: 0 | Status: DISCONTINUED | OUTPATIENT
Start: 2022-07-06 | End: 2022-07-06

## 2022-07-06 RX ORDER — ONDANSETRON 8 MG/1
4 TABLET, FILM COATED ORAL EVERY 8 HOURS
Refills: 0 | Status: DISCONTINUED | OUTPATIENT
Start: 2022-07-06 | End: 2022-07-20

## 2022-07-06 RX ORDER — SODIUM CHLORIDE 9 MG/ML
1000 INJECTION INTRAMUSCULAR; INTRAVENOUS; SUBCUTANEOUS
Refills: 0 | Status: DISCONTINUED | OUTPATIENT
Start: 2022-07-06 | End: 2022-07-07

## 2022-07-06 RX ORDER — ACETAMINOPHEN 500 MG
650 TABLET ORAL EVERY 6 HOURS
Refills: 0 | Status: DISCONTINUED | OUTPATIENT
Start: 2022-07-06 | End: 2022-07-13

## 2022-07-06 RX ORDER — LANOLIN ALCOHOL/MO/W.PET/CERES
3 CREAM (GRAM) TOPICAL AT BEDTIME
Refills: 0 | Status: DISCONTINUED | OUTPATIENT
Start: 2022-07-06 | End: 2022-07-20

## 2022-07-06 RX ORDER — PANTOPRAZOLE SODIUM 20 MG/1
40 TABLET, DELAYED RELEASE ORAL DAILY
Refills: 0 | Status: DISCONTINUED | OUTPATIENT
Start: 2022-07-06 | End: 2022-07-10

## 2022-07-06 RX ORDER — ENOXAPARIN SODIUM 100 MG/ML
40 INJECTION SUBCUTANEOUS EVERY 24 HOURS
Refills: 0 | Status: DISCONTINUED | OUTPATIENT
Start: 2022-07-06 | End: 2022-07-06

## 2022-07-06 RX ORDER — SODIUM CHLORIDE 9 MG/ML
1000 INJECTION INTRAMUSCULAR; INTRAVENOUS; SUBCUTANEOUS ONCE
Refills: 0 | Status: COMPLETED | OUTPATIENT
Start: 2022-07-06 | End: 2022-07-06

## 2022-07-06 RX ADMIN — MORPHINE SULFATE 4 MILLIGRAM(S): 50 CAPSULE, EXTENDED RELEASE ORAL at 15:19

## 2022-07-06 RX ADMIN — SODIUM CHLORIDE 1000 MILLILITER(S): 9 INJECTION INTRAMUSCULAR; INTRAVENOUS; SUBCUTANEOUS at 10:04

## 2022-07-06 RX ADMIN — SODIUM CHLORIDE 100 MILLILITER(S): 9 INJECTION INTRAMUSCULAR; INTRAVENOUS; SUBCUTANEOUS at 19:31

## 2022-07-06 RX ADMIN — MORPHINE SULFATE 4 MILLIGRAM(S): 50 CAPSULE, EXTENDED RELEASE ORAL at 13:45

## 2022-07-06 RX ADMIN — MORPHINE SULFATE 4 MILLIGRAM(S): 50 CAPSULE, EXTENDED RELEASE ORAL at 14:21

## 2022-07-06 RX ADMIN — HYDROMORPHONE HYDROCHLORIDE 1 MILLIGRAM(S): 2 INJECTION INTRAMUSCULAR; INTRAVENOUS; SUBCUTANEOUS at 19:31

## 2022-07-06 RX ADMIN — PANTOPRAZOLE SODIUM 40 MILLIGRAM(S): 20 TABLET, DELAYED RELEASE ORAL at 19:10

## 2022-07-06 RX ADMIN — MORPHINE SULFATE 4 MILLIGRAM(S): 50 CAPSULE, EXTENDED RELEASE ORAL at 10:27

## 2022-07-06 NOTE — H&P ADULT - PROBLEM SELECTOR PLAN 1
Patient with acute on chronic epigastric pain, found to have chronic pancreatitis with stones in the pancreatic duct on CT A/P   -Lipase and LFTs is WNL, low suspicion for acute pancreatitis or cholangitis.   -GI recs appreciated. C/w PPI daily  -Surgery consulted for evaluation for possible [place] procedure  -C/w dilaudid 0.5 mg IV PRN and IVF   -CLD for now

## 2022-07-06 NOTE — ED PROVIDER NOTE - NS_BEDUNITTYPES_ED_ALL_ED
Call from reported caregiver, Johanna at [phone]. Pt name and  confirmed.    Caregiver reports Pt has been having emesis, diarrhea, disorientation. States this started this AM.  States that she is bringing Yasmin, Pt's niece, to the ED. Yasmin's phone number is  [phone]    Updated  on Pt contacts.   MEDICINE

## 2022-07-06 NOTE — H&P ADULT - ASSESSMENT
61M with PMHx of chronic pancreatitis (complicated by chronic pain of which secondary to alcohol abuse), CKD4, hep C s/p SVR and benign esophageal stricture (post-stent 4/19/22 c/b need for CRE balloon dilation, reposition and suture on 4/29) presents with epigastric pain, most likely due to chronic pancreatic duct obstruction.

## 2022-07-06 NOTE — H&P ADULT - NSHPLABSRESULTS_GEN_ALL_CORE
10.1   9.26  )-----------( 338      ( 06 Jul 2022 09:10 )             31.3     Hgb Trend: 10.1<--  07-06    136  |  96<L>  |  80<H>  ----------------------------<  110<H>  3.8   |  24  |  3.64<H>    Ca    9.1      06 Jul 2022 09:10    TPro  6.6  /  Alb  3.6  /  TBili  0.2  /  DBili  x   /  AST  16  /  ALT  12  /  AlkPhos  85  07-06    Creatinine Trend: 3.64<--, 2.35<--, 2.48<--, 2.64<--, 2.61<--        CT A/P:     < from: CT Abdomen and Pelvis No Cont (07.06.22 @ 13:07) >    IMPRESSION:  Redemonstration of findings of chronic pancreatitis. No obvious   peripancreatic inflammation, however paucity of intra-abdominal fat and   lack of IV contrast limits evaluation.    Stomach is distended with debris. Gastric outlet obstruction cannot be   excluded.    < end of copied text >    EKG is ordered.

## 2022-07-06 NOTE — ED PROVIDER NOTE - CLINICAL SUMMARY MEDICAL DECISION MAKING FREE TEXT BOX
62 y/o male hx chronic pancreatitis c/o worsening abdominal pain  -likely secondary chronic pancreatitis, r/o new intra-abdominal pathology, stent issues  -labs ct a/p  -pain control  -GI consult  -likely admission

## 2022-07-06 NOTE — H&P ADULT - HISTORY OF PRESENT ILLNESS
61M with PMHx of chronic pancreatitis (complicated by chronic pain of which secondary to alcohol abuse), CKD4, hep C s/p SVR and benign esophageal stricture (post-stent 4/19/22 c/b need for CRE balloon dilation, reposition and suture on 4/29) presents with epigastric pain. The pain is sharp, 10/10 in severity, and constant. Nothing improves or worsens the pain. He was able to tolerate some food but has stopped eating since today morning. He has been passing gas and had a BM yesterday. He denies any episode of nausea or vomiting after coming to the ED. He has been feeling nauseous intermittently. Otherwise, denies any fever, chills, nausea, chest pain, SOB or LE edema. He has hx of chronic pancreatitis   In the ED, his vitals were stable. Labs were notable for chronic anemia, YUNIEL on CKD. CT A/P showed redemonstration of findings of chronic pancreatitis.  61M with PMHx of chronic pancreatitis (complicated by chronic pain of which secondary to alcohol abuse), CKD4, hep C s/p SVR and benign esophageal stricture (post-stent 4/19/22 c/b need for CRE balloon dilation, reposition and suture on 4/29) presents with epigastric pain. Patient is AAOx3 but intermittently fell asleep during the interview and was able to provide some information. The pain is sharp, 10/10 in severity, and constant. Nothing improves or worsens the pain. He was able to tolerate some food but has stopped eating since today morning. He has been passing gas and had a BM yesterday. He denies any episode of nausea or vomiting after coming to the ED. He has been feeling nauseous intermittently. Otherwise, denies any fever, chills, nausea, chest pain, SOB or LE edema.   In the ED, his vitals were stable. Labs were notable for chronic anemia, YUNIEL on CKD. CT A/P showed redemonstration of findings of chronic pancreatitis.

## 2022-07-06 NOTE — H&P ADULT - NSHPPHYSICALEXAM_GEN_ALL_CORE
Vital Signs Last 24 Hrs  T(C): 36.8 (06 Jul 2022 17:19), Max: 36.9 (06 Jul 2022 07:22)  T(F): 98.3 (06 Jul 2022 17:19), Max: 98.5 (06 Jul 2022 07:22)  HR: 73 (06 Jul 2022 17:19) (70 - 80)  BP: 119/73 (06 Jul 2022 17:19) (98/52 - 137/86)  BP(mean): --  RR: 17 (06 Jul 2022 17:19) (16 - 18)  SpO2: 100% (06 Jul 2022 17:19) (100% - 100%)    GENERAL: in acute distress due to pain, nontoxic appearing.   HEENT:  Atraumatic, Normocephalic, Conjunctiva and sclera clear, oral mucosa moist, clear w/o any exudate   NECK: Supple, No JVD  CHEST/LUNG: Nonlabored breathing. Clear to auscultation bilaterally; No wheeze  HEART: Regular rate and rhythm; No murmurs, rubs, or gallops  ABDOMEN: Soft, Nontender, Nondistended; Bowel sounds present  EXTREMITIES:  2+ Peripheral Pulses, No clubbing, cyanosis, or edema  PSYCH: AAOx3, normal affect  NEUROLOGY: non-focal, moving all extremities   SKIN: No rashes or lesions

## 2022-07-06 NOTE — H&P ADULT - NSHPREVIEWOFSYSTEMS_GEN_ALL_CORE
REVIEW OF SYSTEMS:    CONSTITUTIONAL: No weakness, fevers or chills  EYES/ENT: No visual changes;  No vertigo or throat pain   NECK: No pain or stiffness  RESPIRATORY: No cough, wheezing, hemoptysis; No shortness of breath  CARDIOVASCULAR: No chest pain or palpitations  GASTROINTESTINAL: +epigastric pain. No nausea, vomiting, or hematemesis; No diarrhea or constipation. No melena or hematochezia.  GENITOURINARY: No dysuria, frequency or hematuria  NEUROLOGICAL: No numbness or weakness  MUSCULOSKELETAL: No joint pain, no muscle ache   SKIN: No itching, burning, rashes, or lesions   All other review of systems is negative unless indicated above.

## 2022-07-06 NOTE — ED PROVIDER NOTE - OBJECTIVE STATEMENT
61M with PMHx of chronic pancreatitis (complicated by chronic pain of which secondary to alcohol abuse), CKD4, and esophageal strictures (post-stent) presenting for acute-on-chronic epigastric pain for 2 weeks. Pt. states the pain is slightly worse then usual and had been doubling his percocet dosage without relief. ([facility] #[id number] - shows rx for perc 10mg x 168 tabs on 6/22) - states he took all 168 tablets in 2 weeks due to pain. Pt. states he has an esophageal stent placed which is supposed to be removed and he came to the hospital to have it removed. Denies fever chills nausea vomit weakness dizziness.

## 2022-07-06 NOTE — ED ADULT TRIAGE NOTE - CHIEF COMPLAINT QUOTE
Pt arrives with diffuse abd pain x 2 days. Recently ran out of prescription oxycodone for chronic abd pain. Pt denies N+V, diarrhea. Normal BM yesterday, + flatus. PMHx chronic pancreatitis.

## 2022-07-06 NOTE — H&P ADULT - PROBLEM SELECTOR PLAN 4
YUNIEL on CKD, most likely due to decreased PO intake  -C/w NS @ 100 cc per hr   -No signs of obstruction on CT A/P   -UA is grossly normal   -Trend Scr daily

## 2022-07-06 NOTE — CONSULT NOTE ADULT - SUBJECTIVE AND OBJECTIVE BOX
SURGICAL ONCOLOGY CONSULT  ========================    HPI:  61M with PMHx of ETOH abuse, chronic pancreatitis with chronic PD dilation, PD stone and calcifications, perforated gastric ulcer s/p exlap with David patch repair (2019, Dr. Murray), CKD4, hep C s/p SVR and benign esophageal stricture (post-stent 4/19/22 c/b need for CRE balloon dilation, reposition and suture on 4/29) presents with epigastric pain. Patient reports 2 days of abdominal pain. Denies nausea, vomiting. Endorses flatus and BMs.    Surgical oncology consulted to evaluate if patient is a candidate for the [place] procedure. Patient very somnolent in ED, most of history of obtained from chart review. Patient has an extensive history of chronic pancreatitis with associated pancreatic calcifications, pancreatic ductal dilatation of the body and tail to 1.6cm with an associated obstructing calculi dating as far back as 12/2021. Patient's most recent imaging studies have been done without IV contrast due to CKD, thus limiting evaluation. GI unable to intervene due to patient's esophageal stent.    PAST MEDICAL & SURGICAL HISTORY:  ETOH abuse  sober x1 year approximately    Pancreatitis    Hepatitis C  treated    Gout    HTN (hypertension)    Chronic kidney disease (CKD)    H/O chronic pancreatitis    Gout    Alcohol abuse    Gastric perforation    MEDICATIONS  (STANDING):  heparin   Injectable 5000 Unit(s) SubCutaneous every 12 hours  pantoprazole  Injectable 40 milliGRAM(s) IV Push daily  sodium chloride 0.9%. 1000 milliLiter(s) (100 mL/Hr) IV Continuous <Continuous>    ALLERGIES:  NKDA    ___________________________________________  REVIEW OF SYSTEMS:  All ROS negative except as per HPI.    ___________________________________________  VITALS:  Vital Signs Last 24 Hrs  T(C): 36.8 (06 Jul 2022 19:36), Max: 36.9 (06 Jul 2022 07:22)  T(F): 98.3 (06 Jul 2022 19:36), Max: 98.5 (06 Jul 2022 07:22)  HR: 70 (06 Jul 2022 19:36) (70 - 80)  BP: 121/72 (06 Jul 2022 19:36) (98/52 - 137/86)  BP(mean): --  RR: 16 (06 Jul 2022 19:36) (16 - 18)  SpO2: 100% (06 Jul 2022 19:36) (100% - 100%)    PHYSICAL EXAM:  General: AAOx3, no acute distress.  Respiratory: breathing comfortably, no increased WOB   Abdomen: soft, mild epigastric tenderness, nondistended, no rebound, no guarding  Extremities: Moves all four.     ____________________________________________  LABS:  CBC Full  -  ( 06 Jul 2022 09:10 )  WBC Count : 9.26 K/uL  RBC Count : 3.25 M/uL  Hemoglobin : 10.1 g/dL  Hematocrit : 31.3 %  Platelet Count - Automated : 338 K/uL  Mean Cell Volume : 96.3 fL  Mean Cell Hemoglobin : 31.1 pg  Mean Cell Hemoglobin Concentration : 32.3 gm/dL  Auto Neutrophil # : 6.08 K/uL  Auto Lymphocyte # : 2.25 K/uL  Auto Monocyte # : 0.61 K/uL  Auto Eosinophil # : 0.19 K/uL  Auto Basophil # : 0.10 K/uL  Auto Neutrophil % : 65.6 %  Auto Lymphocyte % : 24.3 %  Auto Monocyte % : 6.6 %  Auto Eosinophil % : 2.1 %  Auto Basophil % : 1.1 %    07-06    136  |  96<L>  |  80<H>  ----------------------------<  110<H>  3.8   |  24  |  3.64<H>    Ca    9.1      06 Jul 2022 09:10    TPro  6.6  /  Alb  3.6  /  TBili  0.2  /  DBili  x   /  AST  16  /  ALT  12  /  AlkPhos  85  07-06    LIVER FUNCTIONS - ( 06 Jul 2022 09:10 )  Alb: 3.6 g/dL / Pro: 6.6 g/dL / ALK PHOS: 85 U/L / ALT: 12 U/L / AST: 16 U/L / GGT: x           ____________________________________________  RADIOLOGY & ADDITIONAL STUDIES:    < from: CT Abdomen and Pelvis No Cont (07.06.22 @ 13:07) >  ACC: [id number] EXAM:  CT ABDOMEN AND PELVIS                          PROCEDURE DATE:  07/06/2022      INTERPRETATION:  CLINICAL INFORMATION: History of pancreatitis. Abdominal   pain.    COMPARISON: CT abdomen pelvis 5/6/2022    CONTRAST/COMPLICATIONS:  IV Contrast: None  Oral Contrast: None  Complications: None reported    PROCEDURE:  CT of the Abdomen and Pelvis was performed.  Sagittal and coronal reformats were performed.    FINDINGS:  Evaluation of the solid organs and vasculature is limited without   intravenous contrast.    LOWER CHEST: Partially visualized distal esophageal stent.    LIVER: Within normal limits.  BILE DUCTS: Normal caliber.  GALLBLADDER: Within normal limits.  SPLEEN: Within normal limits.  PANCREAS: Redemonstration of coarse calcifications throughout the   pancreas, compatible with chronic pancreatitis. Similar dilatation of the   main pancreatic duct in the body and tail possibly due to intraductal   calculus. No obvious peripancreatic inflammation, howeverpaucity of   intra-abdominal fat and lack of IV contrast limits evaluation.  ADRENALS: Within normal limits.  KIDNEYS/URETERS: Within normal limits.    BLADDER: Within normal limits.  REPRODUCTIVE ORGANS: Prostate within normal limits.    BOWEL: Stomach is distended with debris. No obstruction of the small and   large bowel. Stool is noted throughout the colon. Appendix is not   visualized.  PERITONEUM: No ascites.  VESSELS: Atherosclerotic changes.  RETROPERITONEUM/LYMPH NODES: No lymphadenopathy.  ABDOMINAL WALL: Within normal limits.  BONES: Within normal limits.    IMPRESSION:  Redemonstration of findings of chronic pancreatitis. No obvious   peripancreatic inflammation, however paucity of intra-abdominal fat and   lack of IV contrast limits evaluation.    Stomach is distended with debris. Gastric outlet obstruction cannot be   excluded.    < end of copied text >

## 2022-07-06 NOTE — H&P ADULT - PROBLEM SELECTOR PLAN 2
Patient with esophageal stricture s/p stent   -GI recs appreciated. Would not remove stent at this time   -C/w PPI daily   -CLD for now

## 2022-07-06 NOTE — ED ADULT NURSE NOTE - OBJECTIVE STATEMENT
Pt presenting to room 10, awake and alert, AOx4 and ambulatory at baseline. Pt c/o severe abdominal pain and states he believes it is related to chronic pancreatitis. Pt rates pain as a 10 and describes the pain as "radiating everywhere" in the abdomen. Denies n/v/d, fever, cp, sob. PMH alcohol abuse, pancreatitis, gout, CKD, and HTN. Respirations even and unlabored. Tachycardic at 135 on arrival. Pt presenting to room 10, awake and alert, AOx4 and ambulatory at baseline. Pt c/o severe abdominal pain and states he believes it is related to chronic pancreatitis. Pt rates pain as a 10 and describes the pain as "radiating everywhere" in the abdomen. Upon assessment, abd nondistended, nontender. Denies n/v/d, fever, cp, sob. PMH alcohol abuse, pancreatitis, gout, CKD, and HTN. Respirations even and unlabored.

## 2022-07-06 NOTE — CONSULT NOTE ADULT - SUBJECTIVE AND OBJECTIVE BOX
Chief Complaint:  Patient is a 61y old  Male who presents with a chief complaint of     HPI: 61M with PMHx of chronic pancreatitis (complicated by chronic pain of which secondary to alcohol abuse), CKD4, and esophageal strictures (post-stent 4/19/22) presenting for acute-on-chronic epigastric pain for several days.       Allergies:  No Known Allergies      Home Medications:    Hospital Medications:      PMHX/PSHX:  ETOH abuse    Pancreatitis    Hepatitis C    Gout    HTN (hypertension)    Chronic kidney disease (CKD)    H/O chronic pancreatitis    Gout    Alcohol abuse    No significant past surgical history    Gastric perforation    Gastric perforation        Family history:  No pertinent family history in first degree relatives    FH: HTN (hypertension)    FH: hypertension (Father)        Social History:     ROS:     General:  No weight loss, fevers, chills, night sweats, fatigue  Eyes:  No vision changes, no yellowing of eyes   ENT:  No throat pain, runny nose  CV:  No chest pain, palpitations  Resp:  No SOB, cough, wheezing  GI:  See HPI  :  No burning with urination, no hematuria   Muscle:  No muscle pain, weakness  Neuro:  No numbness/tingling, memory problems  Psych:  No fatigue, insomnia, mood problems  Heme:  No easy bruisability  Skin:  No rash, itching       PHYSICAL EXAM:     GENERAL:  Appears stated age, well-groomed, well-nourished, no distress  HEENT:  NC/AT,  conjunctivae clear and pink,  no JVD  CHEST:  Full & symmetric excursion, no increased effort, breath sounds clear  HEART:  Regular rhythm, S1, S2, no murmur/rub/S3/S4, no abdominal bruit, no edema  ABDOMEN:  Soft, non-tender, non-distended, normoactive bowel sounds,  no masses ,  EXTREMITIES:  no cyanosis,clubbing or edema  SKIN:  No rash/erythema/ecchymoses/petechiae/wounds/abscess/warm/dry  NEURO:  Alert, oriented    Vital Signs:  Vital Signs Last 24 Hrs  T(C): 36.1 (06 Jul 2022 14:30), Max: 36.9 (06 Jul 2022 07:22)  T(F): 97 (06 Jul 2022 14:30), Max: 98.5 (06 Jul 2022 07:22)  HR: 77 (06 Jul 2022 14:30) (70 - 80)  BP: 137/86 (06 Jul 2022 14:30) (98/52 - 137/86)  BP(mean): --  RR: 16 (06 Jul 2022 14:30) (16 - 18)  SpO2: 100% (06 Jul 2022 14:30) (100% - 100%)  Daily Height in cm: 185.42 (06 Jul 2022 07:22)    Daily     LABS:                        10.1   9.26  )-----------( 338      ( 06 Jul 2022 09:10 )             31.3     07-06    136  |  96<L>  |  80<H>  ----------------------------<  110<H>  3.8   |  24  |  3.64<H>    Ca    9.1      06 Jul 2022 09:10    TPro  6.6  /  Alb  3.6  /  TBili  0.2  /  DBili  x   /  AST  16  /  ALT  12  /  AlkPhos  85  07-06    LIVER FUNCTIONS - ( 06 Jul 2022 09:10 )  Alb: 3.6 g/dL / Pro: 6.6 g/dL / ALK PHOS: 85 U/L / ALT: 12 U/L / AST: 16 U/L / GGT: x               Amylase Serum--      Lipase serum14       Ammonia--      Imaging:             Chief Complaint:  Patient is a 61y old  Male who presents with a chief complaint of     HPI: 61M with PMHx of chronic pancreatitis (complicated by chronic pain of which secondary to alcohol abuse), CKD4, hep C s/p SVR and benign esophageal stricture (post-stent 4/19/22 c/b need for reposition and suture on 4/29) presenting for acute-on-chronic epigastric pain for several days. Patient intermittently falling asleep during           Allergies:  No Known Allergies      Home Medications:    Hospital Medications:      PMHX/PSHX:  ETOH abuse    Pancreatitis    Hepatitis C    Gout    HTN (hypertension)    Chronic kidney disease (CKD)    H/O chronic pancreatitis    Gout    Alcohol abuse    No significant past surgical history    Gastric perforation    Gastric perforation        Family history:  No pertinent family history in first degree relatives    FH: HTN (hypertension)    FH: hypertension (Father)        Social History:     ROS:     General:  No weight loss, fevers, chills, night sweats, fatigue  Eyes:  No vision changes, no yellowing of eyes   ENT:  No throat pain, runny nose  CV:  No chest pain, palpitations  Resp:  No SOB, cough, wheezing  GI:  See HPI  :  No burning with urination, no hematuria   Muscle:  No muscle pain, weakness  Neuro:  No numbness/tingling, memory problems  Psych:  No fatigue, insomnia, mood problems  Heme:  No easy bruisability  Skin:  No rash, itching       PHYSICAL EXAM:     GENERAL:  Appears stated age, well-groomed, well-nourished, no distress  HEENT:  NC/AT,  conjunctivae clear and pink,  no JVD  CHEST:  Full & symmetric excursion, no increased effort, breath sounds clear  HEART:  Regular rhythm, S1, S2, no murmur/rub/S3/S4, no abdominal bruit, no edema  ABDOMEN:  Soft, non-tender, non-distended, normoactive bowel sounds,  no masses ,  EXTREMITIES:  no cyanosis,clubbing or edema  SKIN:  No rash/erythema/ecchymoses/petechiae/wounds/abscess/warm/dry  NEURO:  Alert, oriented    Vital Signs:  Vital Signs Last 24 Hrs  T(C): 36.1 (06 Jul 2022 14:30), Max: 36.9 (06 Jul 2022 07:22)  T(F): 97 (06 Jul 2022 14:30), Max: 98.5 (06 Jul 2022 07:22)  HR: 77 (06 Jul 2022 14:30) (70 - 80)  BP: 137/86 (06 Jul 2022 14:30) (98/52 - 137/86)  BP(mean): --  RR: 16 (06 Jul 2022 14:30) (16 - 18)  SpO2: 100% (06 Jul 2022 14:30) (100% - 100%)  Daily Height in cm: 185.42 (06 Jul 2022 07:22)    Daily     LABS:                        10.1   9.26  )-----------( 338      ( 06 Jul 2022 09:10 )             31.3     07-06    136  |  96<L>  |  80<H>  ----------------------------<  110<H>  3.8   |  24  |  3.64<H>    Ca    9.1      06 Jul 2022 09:10    TPro  6.6  /  Alb  3.6  /  TBili  0.2  /  DBili  x   /  AST  16  /  ALT  12  /  AlkPhos  85  07-06    LIVER FUNCTIONS - ( 06 Jul 2022 09:10 )  Alb: 3.6 g/dL / Pro: 6.6 g/dL / ALK PHOS: 85 U/L / ALT: 12 U/L / AST: 16 U/L / GGT: x               Amylase Serum--      Lipase serum14       Ammonia--      Imaging:             Chief Complaint:  Patient is a 61y old  Male who presents with a chief complaint of     HPI: 61M with PMHx of chronic pancreatitis (complicated by chronic pain of which secondary to alcohol abuse), CKD4, hep C s/p SVR and benign esophageal stricture (post-stent 4/19/22 c/b need for reposition and suture on 4/29) presenting for acute-on-chronic epigastric pain for several days. Patient intermittently falling asleep during interview.           Allergies:  No Known Allergies      Home Medications:    Hospital Medications:      PMHX/PSHX:  ETOH abuse    Pancreatitis    Hepatitis C    Gout    HTN (hypertension)    Chronic kidney disease (CKD)    H/O chronic pancreatitis    Gout    Alcohol abuse    No significant past surgical history    Gastric perforation    Gastric perforation        Family history:  No pertinent family history in first degree relatives    FH: HTN (hypertension)    FH: hypertension (Father)        Social History:     ROS:     General:  No weight loss, fevers, chills, night sweats, fatigue  Eyes:  No vision changes, no yellowing of eyes   ENT:  No throat pain, runny nose  CV:  No chest pain, palpitations  Resp:  No SOB, cough, wheezing  GI:  See HPI  :  No burning with urination, no hematuria   Muscle:  No muscle pain, weakness  Neuro:  No numbness/tingling, memory problems  Psych:  No fatigue, insomnia, mood problems  Heme:  No easy bruisability  Skin:  No rash, itching       PHYSICAL EXAM:     GENERAL:  Appears stated age, well-groomed, well-nourished, no distress  HEENT:  NC/AT,  conjunctivae clear and pink,  no JVD  CHEST:  Full & symmetric excursion, no increased effort, breath sounds clear  HEART:  Regular rhythm, S1, S2, no murmur/rub/S3/S4, no abdominal bruit, no edema  ABDOMEN:  Soft, non-tender, non-distended, normoactive bowel sounds,  no masses ,  EXTREMITIES:  no cyanosis,clubbing or edema  SKIN:  No rash/erythema/ecchymoses/petechiae/wounds/abscess/warm/dry  NEURO:  Alert, oriented    Vital Signs:  Vital Signs Last 24 Hrs  T(C): 36.1 (06 Jul 2022 14:30), Max: 36.9 (06 Jul 2022 07:22)  T(F): 97 (06 Jul 2022 14:30), Max: 98.5 (06 Jul 2022 07:22)  HR: 77 (06 Jul 2022 14:30) (70 - 80)  BP: 137/86 (06 Jul 2022 14:30) (98/52 - 137/86)  BP(mean): --  RR: 16 (06 Jul 2022 14:30) (16 - 18)  SpO2: 100% (06 Jul 2022 14:30) (100% - 100%)  Daily Height in cm: 185.42 (06 Jul 2022 07:22)    Daily     LABS:                        10.1   9.26  )-----------( 338      ( 06 Jul 2022 09:10 )             31.3     07-06    136  |  96<L>  |  80<H>  ----------------------------<  110<H>  3.8   |  24  |  3.64<H>    Ca    9.1      06 Jul 2022 09:10    TPro  6.6  /  Alb  3.6  /  TBili  0.2  /  DBili  x   /  AST  16  /  ALT  12  /  AlkPhos  85  07-06    LIVER FUNCTIONS - ( 06 Jul 2022 09:10 )  Alb: 3.6 g/dL / Pro: 6.6 g/dL / ALK PHOS: 85 U/L / ALT: 12 U/L / AST: 16 U/L / GGT: x               Amylase Serum--      Lipase serum14       Ammonia--      Imaging:             Chief Complaint:  Patient is a 61y old  Male who presents with a chief complaint of     HPI: 61M with PMHx of chronic pancreatitis (complicated by chronic pain of which secondary to alcohol abuse), CKD4, hep C s/p SVR and benign esophageal stricture (post-stent 4/19/22 c/b need for CRE balloon dilation, reposition and suture on 4/29) presenting for acute-on-chronic epigastric pain for several days. Patient intermittently falling asleep during interview. He states he has severe epigastric pain that radiates to his whole body that started 2 days ago. He states he quit alcohol 1year prior. Has not been able to eat or drink since Thursday or Friday. Patient is unable to elaborate further as he intermittently falls asleep or does not answer questions.          Allergies:  No Known Allergies      Home Medications:    Hospital Medications:      PMHX/PSHX:  ETOH abuse    Pancreatitis    Hepatitis C    Gout    HTN (hypertension)    Chronic kidney disease (CKD)    H/O chronic pancreatitis    Gout    Alcohol abuse    No significant past surgical history    Gastric perforation    Gastric perforation        Family history:  No pertinent family history in first degree relatives    FH: HTN (hypertension)    FH: hypertension (Father)        Social History:     ROS:     General:  No weight loss, fevers, chills, night sweats, fatigue  Eyes:  No vision changes, no yellowing of eyes   ENT:  No throat pain, runny nose  CV:  No chest pain, palpitations  Resp:  No SOB, cough, wheezing  GI:  See HPI  :  No burning with urination, no hematuria   Muscle:  No muscle pain, weakness  Neuro:  No numbness/tingling, memory problems  Psych:  No fatigue, insomnia, mood problems  Heme:  No easy bruisability  Skin:  No rash, itching       PHYSICAL EXAM:     GENERAL: appears in distress due to pain when awake however also lethargic, cachectic  HEENT:  NC/AT,  conjunctivae clear and pink, temporal wasting  CHEST:  Full & symmetric excursion, no increased effort, breath sounds clear  HEART:  Regular rhythm, rate  ABDOMEN:  tender to palpation in epigastric area  EXTREMITIES:  no cyanosis,clubbing or edema  SKIN:  No rash/erythema/ecchymoses/petechiae/wounds/abscess/warm/dry  NEURO: intermittently falls asleep during interview    Vital Signs:  Vital Signs Last 24 Hrs  T(C): 36.1 (06 Jul 2022 14:30), Max: 36.9 (06 Jul 2022 07:22)  T(F): 97 (06 Jul 2022 14:30), Max: 98.5 (06 Jul 2022 07:22)  HR: 77 (06 Jul 2022 14:30) (70 - 80)  BP: 137/86 (06 Jul 2022 14:30) (98/52 - 137/86)  BP(mean): --  RR: 16 (06 Jul 2022 14:30) (16 - 18)  SpO2: 100% (06 Jul 2022 14:30) (100% - 100%)  Daily Height in cm: 185.42 (06 Jul 2022 07:22)    Daily     LABS:                        10.1   9.26  )-----------( 338      ( 06 Jul 2022 09:10 )             31.3     07-06    136  |  96<L>  |  80<H>  ----------------------------<  110<H>  3.8   |  24  |  3.64<H>    Ca    9.1      06 Jul 2022 09:10    TPro  6.6  /  Alb  3.6  /  TBili  0.2  /  DBili  x   /  AST  16  /  ALT  12  /  AlkPhos  85  07-06    LIVER FUNCTIONS - ( 06 Jul 2022 09:10 )  Alb: 3.6 g/dL / Pro: 6.6 g/dL / ALK PHOS: 85 U/L / ALT: 12 U/L / AST: 16 U/L / GGT: x               Amylase Serum--      Lipase serum14       Ammonia--      Imaging:  < from: CT Abdomen and Pelvis No Cont (07.06.22 @ 13:07) >  FINDINGS:  Evaluation of the solid organs and vasculature is limited without   intravenous contrast.    LOWER CHEST: Partially visualized distal esophageal stent.    LIVER: Within normal limits.  BILE DUCTS: Normal caliber.  GALLBLADDER: Within normal limits.  SPLEEN: Within normal limits.  PANCREAS: Redemonstration of coarse calcifications throughout the   pancreas, compatible with chronic pancreatitis. Similar dilatation of the   main pancreatic duct in the body and tail possibly due to intraductal   calculus. No obvious peripancreatic inflammation, howeverpaucity of   intra-abdominal fat and lack of IV contrast limits evaluation.  ADRENALS: Within normal limits.  KIDNEYS/URETERS: Within normal limits.    BLADDER: Within normal limits.  REPRODUCTIVE ORGANS: Prostate within normal limits.    BOWEL: Stomach is distended with debris. No obstruction of the small and   large bowel. Stool is noted throughout the colon. Appendix is not   visualized.  PERITONEUM: No ascites.  VESSELS: Atherosclerotic changes.  RETROPERITONEUM/LYMPH NODES: No lymphadenopathy.  ABDOMINAL WALL: Within normal limits.  BONES: Within normal limits.    IMPRESSION:  Redemonstration of findings of chronic pancreatitis. No obvious   peripancreatic inflammation, however paucity of intra-abdominal fat and   lack of IV contrast limits evaluation.    Stomach is distended with debris. Gastric outlet obstruction cannot be   excluded.    --- End of Report ---    < end of copied text >             Chief Complaint:  Patient is a 61y old  Male who presents with a chief complaint of     HPI: 61M with PMHx of chronic pancreatitis (complicated by chronic pain of which secondary to alcohol abuse), CKD4, hep C s/p SVR and benign esophageal stricture (post-stent 4/19/22 c/b need for CRE balloon dilation, reposition and suture on 4/29) presenting for acute-on-chronic epigastric pain for several days. Patient intermittently falling asleep during interview. He states he has severe epigastric pain that radiates to his whole body that started 2 days ago. He states he quit alcohol 1 year prior. Has not been able to eat or drink since Thursday or Friday. Patient is unable to elaborate further as he intermittently falls asleep or does not answer questions. Denies trouble swallowing.          Allergies:  No Known Allergies      Home Medications:    Hospital Medications:      PMHX/PSHX:  ETOH abuse    Pancreatitis    Hepatitis C    Gout    HTN (hypertension)    Chronic kidney disease (CKD)    H/O chronic pancreatitis    Gout    Alcohol abuse    No significant past surgical history    Gastric perforation    Gastric perforation        Family history:  No pertinent family history in first degree relatives    FH: HTN (hypertension)    FH: hypertension (Father)        Social History:     ROS:     General:  No weight loss, fevers, chills, night sweats, fatigue  Eyes:  No vision changes, no yellowing of eyes   ENT:  No throat pain, runny nose  CV:  No chest pain, palpitations  Resp:  No SOB, cough, wheezing  GI:  See HPI  :  No burning with urination, no hematuria   Muscle:  No muscle pain, weakness  Neuro:  No numbness/tingling, memory problems  Psych:  No fatigue, insomnia, mood problems  Heme:  No easy bruisability  Skin:  No rash, itching       PHYSICAL EXAM:     GENERAL: appears in distress due to pain when awake however also lethargic, cachectic  HEENT:  NC/AT,  conjunctivae clear and pink, temporal wasting  CHEST:  Full & symmetric excursion, no increased effort, breath sounds clear  HEART:  Regular rhythm, rate  ABDOMEN:  tender to palpation in epigastric area  EXTREMITIES:  no cyanosis,clubbing or edema  SKIN:  No rash/erythema/ecchymoses/petechiae/wounds/abscess/warm/dry  NEURO: intermittently falls asleep during interview    Vital Signs:  Vital Signs Last 24 Hrs  T(C): 36.1 (06 Jul 2022 14:30), Max: 36.9 (06 Jul 2022 07:22)  T(F): 97 (06 Jul 2022 14:30), Max: 98.5 (06 Jul 2022 07:22)  HR: 77 (06 Jul 2022 14:30) (70 - 80)  BP: 137/86 (06 Jul 2022 14:30) (98/52 - 137/86)  BP(mean): --  RR: 16 (06 Jul 2022 14:30) (16 - 18)  SpO2: 100% (06 Jul 2022 14:30) (100% - 100%)  Daily Height in cm: 185.42 (06 Jul 2022 07:22)    Daily     LABS:                        10.1   9.26  )-----------( 338      ( 06 Jul 2022 09:10 )             31.3     07-06    136  |  96<L>  |  80<H>  ----------------------------<  110<H>  3.8   |  24  |  3.64<H>    Ca    9.1      06 Jul 2022 09:10    TPro  6.6  /  Alb  3.6  /  TBili  0.2  /  DBili  x   /  AST  16  /  ALT  12  /  AlkPhos  85  07-06    LIVER FUNCTIONS - ( 06 Jul 2022 09:10 )  Alb: 3.6 g/dL / Pro: 6.6 g/dL / ALK PHOS: 85 U/L / ALT: 12 U/L / AST: 16 U/L / GGT: x               Amylase Serum--      Lipase serum14       Ammonia--      Imaging:  < from: CT Abdomen and Pelvis No Cont (07.06.22 @ 13:07) >  FINDINGS:  Evaluation of the solid organs and vasculature is limited without   intravenous contrast.    LOWER CHEST: Partially visualized distal esophageal stent.    LIVER: Within normal limits.  BILE DUCTS: Normal caliber.  GALLBLADDER: Within normal limits.  SPLEEN: Within normal limits.  PANCREAS: Redemonstration of coarse calcifications throughout the   pancreas, compatible with chronic pancreatitis. Similar dilatation of the   main pancreatic duct in the body and tail possibly due to intraductal   calculus. No obvious peripancreatic inflammation, howeverpaucity of   intra-abdominal fat and lack of IV contrast limits evaluation.  ADRENALS: Within normal limits.  KIDNEYS/URETERS: Within normal limits.    BLADDER: Within normal limits.  REPRODUCTIVE ORGANS: Prostate within normal limits.    BOWEL: Stomach is distended with debris. No obstruction of the small and   large bowel. Stool is noted throughout the colon. Appendix is not   visualized.  PERITONEUM: No ascites.  VESSELS: Atherosclerotic changes.  RETROPERITONEUM/LYMPH NODES: No lymphadenopathy.  ABDOMINAL WALL: Within normal limits.  BONES: Within normal limits.    IMPRESSION:  Redemonstration of findings of chronic pancreatitis. No obvious   peripancreatic inflammation, however paucity of intra-abdominal fat and   lack of IV contrast limits evaluation.    Stomach is distended with debris. Gastric outlet obstruction cannot be   excluded.    --- End of Report ---    < end of copied text >

## 2022-07-06 NOTE — H&P ADULT - PROBLEM SELECTOR PLAN 3
Ct A/P concerning for gastric outlet obstruction   -Pt refuses any episode of nausea or vomiting in the ED. Also, endorses passing gas and had a BM yesterday.  -Pt does not seem to be clinically obstruction   -Surg consulted

## 2022-07-06 NOTE — ED PROVIDER NOTE - ATTENDING APP SHARED VISIT CONTRIBUTION OF CARE
I (Petra) agree with above, I performed a history and physical. Counseled chet medical staff, physician assistant, and/or medical student on medical decision making as documented. Medical decisions and treatment interventions were made in real time during the patient encounter. Additionally and/or with the following exceptions: The patient presented to the ED with epigastric pain as above. had epigastric tenderness to palpation. Was uncomfortable appearing likely secondary to pain, given morphine. Found to have pancreatitis and admitted for same. The patient's condition was not amenable to outpatient treatment due either the lack of feasibility of outpatient care coordination, possibility for further decompensation with adverse outcome if discharge, or treatments and diagnostic  modalities only available during an inpatient hospitalization.

## 2022-07-07 LAB
ANION GAP SERPL CALC-SCNC: 12 MMOL/L — SIGNIFICANT CHANGE UP (ref 7–14)
BASOPHILS # BLD AUTO: 0.04 K/UL — SIGNIFICANT CHANGE UP (ref 0–0.2)
BASOPHILS NFR BLD AUTO: 0.4 % — SIGNIFICANT CHANGE UP (ref 0–2)
BUN SERPL-MCNC: 63 MG/DL — HIGH (ref 7–23)
CALCIUM SERPL-MCNC: 8 MG/DL — LOW (ref 8.4–10.5)
CHLORIDE SERPL-SCNC: 109 MMOL/L — HIGH (ref 98–107)
CO2 SERPL-SCNC: 22 MMOL/L — SIGNIFICANT CHANGE UP (ref 22–31)
CREAT SERPL-MCNC: 2.76 MG/DL — HIGH (ref 0.5–1.3)
EGFR: 25 ML/MIN/1.73M2 — LOW
EOSINOPHIL # BLD AUTO: 0.21 K/UL — SIGNIFICANT CHANGE UP (ref 0–0.5)
EOSINOPHIL NFR BLD AUTO: 2 % — SIGNIFICANT CHANGE UP (ref 0–6)
FERRITIN SERPL-MCNC: 16 NG/ML — LOW (ref 30–400)
GLUCOSE SERPL-MCNC: 84 MG/DL — SIGNIFICANT CHANGE UP (ref 70–99)
HCT VFR BLD CALC: 25.9 % — LOW (ref 39–50)
HGB BLD-MCNC: 8.1 G/DL — LOW (ref 13–17)
IANC: 7.93 K/UL — HIGH (ref 1.8–7.4)
IMM GRANULOCYTES NFR BLD AUTO: 0.4 % — SIGNIFICANT CHANGE UP (ref 0–1.5)
IRON SATN MFR SERPL: 10 % — LOW (ref 14–50)
IRON SATN MFR SERPL: 22 UG/DL — LOW (ref 45–165)
LYMPHOCYTES # BLD AUTO: 1.73 K/UL — SIGNIFICANT CHANGE UP (ref 1–3.3)
LYMPHOCYTES # BLD AUTO: 16.5 % — SIGNIFICANT CHANGE UP (ref 13–44)
MCHC RBC-ENTMCNC: 30.9 PG — SIGNIFICANT CHANGE UP (ref 27–34)
MCHC RBC-ENTMCNC: 31.3 GM/DL — LOW (ref 32–36)
MCV RBC AUTO: 98.9 FL — SIGNIFICANT CHANGE UP (ref 80–100)
MONOCYTES # BLD AUTO: 0.55 K/UL — SIGNIFICANT CHANGE UP (ref 0–0.9)
MONOCYTES NFR BLD AUTO: 5.2 % — SIGNIFICANT CHANGE UP (ref 2–14)
NEUTROPHILS # BLD AUTO: 7.93 K/UL — HIGH (ref 1.8–7.4)
NEUTROPHILS NFR BLD AUTO: 75.5 % — SIGNIFICANT CHANGE UP (ref 43–77)
NRBC # BLD: 0 /100 WBCS — SIGNIFICANT CHANGE UP
NRBC # FLD: 0 K/UL — SIGNIFICANT CHANGE UP
PLATELET # BLD AUTO: 279 K/UL — SIGNIFICANT CHANGE UP (ref 150–400)
POTASSIUM SERPL-MCNC: 4.2 MMOL/L — SIGNIFICANT CHANGE UP (ref 3.5–5.3)
POTASSIUM SERPL-SCNC: 4.2 MMOL/L — SIGNIFICANT CHANGE UP (ref 3.5–5.3)
RBC # BLD: 2.62 M/UL — LOW (ref 4.2–5.8)
RBC # FLD: 15.6 % — HIGH (ref 10.3–14.5)
SODIUM SERPL-SCNC: 143 MMOL/L — SIGNIFICANT CHANGE UP (ref 135–145)
TIBC SERPL-MCNC: 215 UG/DL — LOW (ref 220–430)
UIBC SERPL-MCNC: 193 UG/DL — SIGNIFICANT CHANGE UP (ref 110–370)
WBC # BLD: 10.5 K/UL — SIGNIFICANT CHANGE UP (ref 3.8–10.5)
WBC # FLD AUTO: 10.5 K/UL — SIGNIFICANT CHANGE UP (ref 3.8–10.5)

## 2022-07-07 PROCEDURE — 99233 SBSQ HOSP IP/OBS HIGH 50: CPT

## 2022-07-07 PROCEDURE — 99232 SBSQ HOSP IP/OBS MODERATE 35: CPT | Mod: GC

## 2022-07-07 RX ORDER — HYDROMORPHONE HYDROCHLORIDE 2 MG/ML
0.25 INJECTION INTRAMUSCULAR; INTRAVENOUS; SUBCUTANEOUS ONCE
Refills: 0 | Status: DISCONTINUED | OUTPATIENT
Start: 2022-07-07 | End: 2022-07-07

## 2022-07-07 RX ORDER — FERROUS SULFATE 325(65) MG
325 TABLET ORAL DAILY
Refills: 0 | Status: DISCONTINUED | OUTPATIENT
Start: 2022-07-07 | End: 2022-07-20

## 2022-07-07 RX ORDER — HYDROMORPHONE HYDROCHLORIDE 2 MG/ML
1 INJECTION INTRAMUSCULAR; INTRAVENOUS; SUBCUTANEOUS EVERY 4 HOURS
Refills: 0 | Status: DISCONTINUED | OUTPATIENT
Start: 2022-07-07 | End: 2022-07-08

## 2022-07-07 RX ORDER — SODIUM CHLORIDE 9 MG/ML
1000 INJECTION INTRAMUSCULAR; INTRAVENOUS; SUBCUTANEOUS
Refills: 0 | Status: DISCONTINUED | OUTPATIENT
Start: 2022-07-07 | End: 2022-07-08

## 2022-07-07 RX ORDER — ASCORBIC ACID 60 MG
500 TABLET,CHEWABLE ORAL DAILY
Refills: 0 | Status: DISCONTINUED | OUTPATIENT
Start: 2022-07-07 | End: 2022-07-20

## 2022-07-07 RX ORDER — HYDROMORPHONE HYDROCHLORIDE 2 MG/ML
1.5 INJECTION INTRAMUSCULAR; INTRAVENOUS; SUBCUTANEOUS EVERY 4 HOURS
Refills: 0 | Status: DISCONTINUED | OUTPATIENT
Start: 2022-07-07 | End: 2022-07-09

## 2022-07-07 RX ADMIN — HYDROMORPHONE HYDROCHLORIDE 1 MILLIGRAM(S): 2 INJECTION INTRAMUSCULAR; INTRAVENOUS; SUBCUTANEOUS at 10:00

## 2022-07-07 RX ADMIN — SODIUM CHLORIDE 100 MILLILITER(S): 9 INJECTION INTRAMUSCULAR; INTRAVENOUS; SUBCUTANEOUS at 05:15

## 2022-07-07 RX ADMIN — HYDROMORPHONE HYDROCHLORIDE 1 MILLIGRAM(S): 2 INJECTION INTRAMUSCULAR; INTRAVENOUS; SUBCUTANEOUS at 09:45

## 2022-07-07 RX ADMIN — HYDROMORPHONE HYDROCHLORIDE 1.5 MILLIGRAM(S): 2 INJECTION INTRAMUSCULAR; INTRAVENOUS; SUBCUTANEOUS at 20:24

## 2022-07-07 RX ADMIN — HYDROMORPHONE HYDROCHLORIDE 1 MILLIGRAM(S): 2 INJECTION INTRAMUSCULAR; INTRAVENOUS; SUBCUTANEOUS at 03:28

## 2022-07-07 RX ADMIN — HEPARIN SODIUM 5000 UNIT(S): 5000 INJECTION INTRAVENOUS; SUBCUTANEOUS at 05:15

## 2022-07-07 RX ADMIN — HYDROMORPHONE HYDROCHLORIDE 1 MILLIGRAM(S): 2 INJECTION INTRAMUSCULAR; INTRAVENOUS; SUBCUTANEOUS at 02:58

## 2022-07-07 RX ADMIN — PANTOPRAZOLE SODIUM 40 MILLIGRAM(S): 20 TABLET, DELAYED RELEASE ORAL at 11:47

## 2022-07-07 RX ADMIN — HYDROMORPHONE HYDROCHLORIDE 1.5 MILLIGRAM(S): 2 INJECTION INTRAMUSCULAR; INTRAVENOUS; SUBCUTANEOUS at 15:54

## 2022-07-07 RX ADMIN — HYDROMORPHONE HYDROCHLORIDE 1.5 MILLIGRAM(S): 2 INJECTION INTRAMUSCULAR; INTRAVENOUS; SUBCUTANEOUS at 20:40

## 2022-07-07 RX ADMIN — HYDROMORPHONE HYDROCHLORIDE 1.5 MILLIGRAM(S): 2 INJECTION INTRAMUSCULAR; INTRAVENOUS; SUBCUTANEOUS at 16:09

## 2022-07-07 RX ADMIN — HYDROMORPHONE HYDROCHLORIDE 0.25 MILLIGRAM(S): 2 INJECTION INTRAMUSCULAR; INTRAVENOUS; SUBCUTANEOUS at 06:12

## 2022-07-07 RX ADMIN — HYDROMORPHONE HYDROCHLORIDE 0.25 MILLIGRAM(S): 2 INJECTION INTRAMUSCULAR; INTRAVENOUS; SUBCUTANEOUS at 05:42

## 2022-07-07 RX ADMIN — HEPARIN SODIUM 5000 UNIT(S): 5000 INJECTION INTRAVENOUS; SUBCUTANEOUS at 17:10

## 2022-07-07 NOTE — PATIENT PROFILE ADULT - FUNCTIONAL ASSESSMENT - BASIC MOBILITY 6.
3-calculated by average/Not able to assess (calculate score using [facility] averaging method)

## 2022-07-07 NOTE — PROGRESS NOTE ADULT - SUBJECTIVE AND OBJECTIVE BOX
Chief Complaint:  Patient is a 61y old  Male who presents with a chief complaint of abdominal pain (06 Jul 2022 21:48)      Interval Events: no acute events overnight  - continues to have epigastric pain      Hospital Medications:  acetaminophen     Tablet .. 650 milliGRAM(s) Oral every 6 hours PRN  aluminum hydroxide/magnesium hydroxide/simethicone Suspension 30 milliLiter(s) Oral every 4 hours PRN  heparin   Injectable 5000 Unit(s) SubCutaneous every 12 hours  HYDROmorphone  Injectable 1 milliGRAM(s) IV Push every 6 hours PRN  HYDROmorphone  Injectable 0.5 milliGRAM(s) IV Push every 6 hours PRN  melatonin 3 milliGRAM(s) Oral at bedtime PRN  ondansetron Injectable 4 milliGRAM(s) IV Push every 8 hours PRN  pantoprazole  Injectable 40 milliGRAM(s) IV Push daily  sodium chloride 0.9%. 1000 milliLiter(s) IV Continuous <Continuous>      PMHX/PSHX:  ETOH abuse    Pancreatitis    Hepatitis C    Gout    HTN (hypertension)    Chronic kidney disease (CKD)    H/O chronic pancreatitis    Gout    Alcohol abuse    No significant past surgical history    Gastric perforation    Gastric perforation            ROS:     General:  No weight loss, fevers, chills, night sweats, fatigue   Eyes:  No vision changes  ENT:  No sore throat, pain, runny nose  CV:  No chest pain, palpitations, dizziness   Resp:  No SOB, cough, wheezing  GI:  See HPI  :  No burning with urination, hematuria  Muscle:  No pain, weakness  Neuro:  No weakness/tingling, memory problems  Psych:  No fatigue, insomnia, mood problems, depression  Heme:  No easy bruisability  Skin:  No rash, edema      PHYSICAL EXAM:     GENERAL: no distress, cachectic  HEENT:  NC/AT,  conjunctivae clear and pink, temporal wasting  CHEST:  Full & symmetric excursion, no increased effort, breath sounds clear  HEART:  Regular rhythm, rate  ABDOMEN:  tender to palpation in epigastric area  EXTREMITIES:  no cyanosis,clubbing or edema  SKIN:  No rash/erythema/ecchymoses/petechiae/wounds/abscess/warm/dry  NEURO: AOx3    Vital Signs:  Vital Signs Last 24 Hrs  T(C): 36.5 (07 Jul 2022 05:41), Max: 36.8 (06 Jul 2022 17:19)  T(F): 97.7 (07 Jul 2022 05:41), Max: 98.3 (06 Jul 2022 17:19)  HR: 63 (07 Jul 2022 05:41) (63 - 80)  BP: 119/71 (07 Jul 2022 05:41) (98/52 - 137/86)  BP(mean): --  RR: 17 (07 Jul 2022 05:41) (16 - 18)  SpO2: 100% (07 Jul 2022 05:41) (100% - 100%)  Daily Height in cm: 185.4 (07 Jul 2022 03:38)    Daily     LABS:                        8.1    10.50 )-----------( 279      ( 07 Jul 2022 06:28 )             25.9     07-07    143  |  109<H>  |  63<H>  ----------------------------<  84  4.2   |  22  |  2.76<H>    Ca    8.0<L>      07 Jul 2022 06:28    TPro  6.6  /  Alb  3.6  /  TBili  0.2  /  DBili  x   /  AST  16  /  ALT  12  /  AlkPhos  85  07-06    LIVER FUNCTIONS - ( 06 Jul 2022 09:10 )  Alb: 3.6 g/dL / Pro: 6.6 g/dL / ALK PHOS: 85 U/L / ALT: 12 U/L / AST: 16 U/L / GGT: x                   Imaging:

## 2022-07-07 NOTE — PATIENT PROFILE ADULT - FOOD INSECURITY
Report received. Pt resting on bed, watching TV, NAD, family at bedside, urine sample obtained and sent to lab at this time.    no

## 2022-07-07 NOTE — PATIENT PROFILE ADULT - FUNCTIONAL SCREEN CURRENT LEVEL: SWALLOWING (IF SCORE 2 OR MORE FOR ANY ITEM, CONSULT REHAB SERVICES), MLM)
Pt calling in wanting to be seen by someone today.  He is having problems with a terrible cough and inceased SOB. Pt has COPD.  I made him an appt with [facility] for today at 1045.  Pt very appreciative for appt.      Electronically signed by:Krupa Ruiz R.N.  Jul  3 2018  8:16AM CST     0 = swallows foods/liquids without difficulty

## 2022-07-07 NOTE — PROGRESS NOTE ADULT - SUBJECTIVE AND OBJECTIVE BOX
[facility] Division of Hospital Medicine  Abram Rao) MD Chava  Pager [phone]    SUBJECTIVE:  Chief complaint: Abd pain.    Pt seen and evaluated at bedside. Continues to report 10/10 abdominal pain. Central abdomen radiating to the back. No f/c, Tolerating PO otherwise.       ROS: All systems negative except as noted.      Vital Signs Last 24 Hrs  T(C): 36.4 (07 Jul 2022 12:15), Max: 36.8 (06 Jul 2022 17:19)  T(F): 97.5 (07 Jul 2022 12:15), Max: 98.3 (06 Jul 2022 17:19)  HR: 92 (07 Jul 2022 12:15) (63 - 92)  BP: 124/67 (07 Jul 2022 12:15) (116/60 - 136/73)  BP(mean): --  RR: 18 (07 Jul 2022 12:15) (16 - 18)  SpO2: 99% (07 Jul 2022 12:15) (99% - 100%)      PHYSICAL EXAM:  Gen- In bed, comfortable, NAD  Eyes- EOMI, PERRLA, nonicteric.  EMNT- Fair dentition. Dry MM. No tonsilar exudates. No posterior pharynx erythema.  Neck- Supple. No masses. No tracheal deviation.  Resp- CTAB, good effort. No r/r/w. No accessory muscle use.  CVS- RRR, S1S2, no g/r/m. No LE edema.  GI- Soft abd, tenderness to deep palp.  +BSx4. No HSM.  MSK- No C/C. ROM intact. No crepitus.  Neuro- CN II-XII intact. Speech fluent/face symmetric. Sensation intact.  Skin- No rashes/ulcers. Warm/moist.  Psych- AAOx3. Appropriate mood/affect.      MEDICATION:  MEDICATIONS  (STANDING):  heparin   Injectable 5000 Unit(s) SubCutaneous every 12 hours  pantoprazole  Injectable 40 milliGRAM(s) IV Push daily  sodium chloride 0.9%. 1000 milliLiter(s) (100 mL/Hr) IV Continuous <Continuous>    MEDICATIONS  (PRN):  acetaminophen     Tablet .. 650 milliGRAM(s) Oral every 6 hours PRN Temp greater or equal to 38C (100.4F), Mild Pain (1 - 3)  aluminum hydroxide/magnesium hydroxide/simethicone Suspension 30 milliLiter(s) Oral every 4 hours PRN Dyspepsia  HYDROmorphone  Injectable 1 milliGRAM(s) IV Push every 4 hours PRN Moderate Pain (4 - 6)  HYDROmorphone  Injectable 1.5 milliGRAM(s) IV Push every 4 hours PRN Severe Pain (7 - 10)  melatonin 3 milliGRAM(s) Oral at bedtime PRN Insomnia  ondansetron Injectable 4 milliGRAM(s) IV Push every 8 hours PRN Nausea and/or Vomiting            LABORATORY:                          8.1    10.50 )-----------( 279      ( 07 Jul 2022 06:28 )             25.9     07-07    143  |  109<H>  |  63<H>  ----------------------------<  84  4.2   |  22  |  2.76<H>    Ca    8.0<L>      07 Jul 2022 06:28    TPro  6.6  /  Alb  3.6  /  TBili  0.2  /  DBili  x   /  AST  16  /  ALT  12  /  AlkPhos  85  07-06              SARS-CoV-2: NotDetec (06 Jul 2022 15:07)  COVID-19 PCR: NotDetec (18 Apr 2022 15:26)  SARS-CoV-2: NotDetec (14 Apr 2022 21:11)  COVID-19 PCR: NotDetec (27 Jan 2022 16:46)

## 2022-07-07 NOTE — PATIENT PROFILE ADULT - FALL HARM RISK - HARM RISK INTERVENTIONS

## 2022-07-07 NOTE — PROGRESS NOTE ADULT - PROBLEM SELECTOR PLAN 3
Ct A/P concerning for gastric outlet obstruction   -Pt does not seem to be clinically obstruction   -Surg consulted - will cont to monitor for now.

## 2022-07-07 NOTE — PROGRESS NOTE ADULT - ASSESSMENT
61M with PMHx of chronic pancreatitis (complicated by chronic pain of which secondary to alcohol abuse), CKD4, hep C s/p SVR and benign esophageal stricture (post-stent 4/19/22 c/b need for CRE balloon dilation, reposition and suture on 4/29) presenting for acute-on-chronic epigastric pain for several days.    Impression:  #Chronic pancreatitis with PD dilation and stones seen on MR (4/20/22). Seen outpatient with plan to refer to surgery for evaluation for Puestow procedure  #Esophageal stricture likely 2/2 reflux s/p esophageal stent requiring dilation and reposition 4/29. Stent in place on CT, patient without dysphagia complaints at this time.    Recommendation:  - Appreciate surgery eval, f/u recs  - would not remove esophageal stent at this time  - PPI daily  - pain control per primary team  - advance diet as tolerated    Note not finalized until signed by attending.    Desire Larios PGY-6  Gastroenterology/Hepatology Fellow  Pager #[phone] (RYAN) or [phone] (NS)  Available on [employer] Teams.  Please contact on-call GI fellow via answering service ([phone]) after 5pm and before 8am, and on weekends.

## 2022-07-07 NOTE — PROGRESS NOTE ADULT - PROBLEM SELECTOR PLAN 1
Patient with acute on chronic epigastric pain, found to have chronic pancreatitis with stones in the pancreatic duct on CT A/P   -Lipase and LFTs is WNL, low suspicion for acute pancreatitis or cholangitis.   -GI recs appreciated. C/w PPI daily  -Surgery consulted for evaluation for possible Puestow procedure  -Dilaudid PRN -- titrated up today. Monitor for adverse effects.   -CLD for now Cont IVF.

## 2022-07-08 LAB
ALBUMIN SERPL ELPH-MCNC: 2.8 G/DL — LOW (ref 3.3–5)
ALP SERPL-CCNC: 71 U/L — SIGNIFICANT CHANGE UP (ref 40–120)
ALT FLD-CCNC: 11 U/L — SIGNIFICANT CHANGE UP (ref 4–41)
ANION GAP SERPL CALC-SCNC: 11 MMOL/L — SIGNIFICANT CHANGE UP (ref 7–14)
AST SERPL-CCNC: 13 U/L — SIGNIFICANT CHANGE UP (ref 4–40)
BILIRUB SERPL-MCNC: <0.2 MG/DL — SIGNIFICANT CHANGE UP (ref 0.2–1.2)
BUN SERPL-MCNC: 46 MG/DL — HIGH (ref 7–23)
CALCIUM SERPL-MCNC: 8.1 MG/DL — LOW (ref 8.4–10.5)
CHLORIDE SERPL-SCNC: 113 MMOL/L — HIGH (ref 98–107)
CO2 SERPL-SCNC: 18 MMOL/L — LOW (ref 22–31)
CREAT SERPL-MCNC: 2.31 MG/DL — HIGH (ref 0.5–1.3)
EGFR: 31 ML/MIN/1.73M2 — LOW
GLUCOSE SERPL-MCNC: 91 MG/DL — SIGNIFICANT CHANGE UP (ref 70–99)
HCT VFR BLD CALC: 26.7 % — LOW (ref 39–50)
HGB BLD-MCNC: 8.3 G/DL — LOW (ref 13–17)
MAGNESIUM SERPL-MCNC: 1.6 MG/DL — SIGNIFICANT CHANGE UP (ref 1.6–2.6)
MCHC RBC-ENTMCNC: 31 PG — SIGNIFICANT CHANGE UP (ref 27–34)
MCHC RBC-ENTMCNC: 31.1 GM/DL — LOW (ref 32–36)
MCV RBC AUTO: 99.6 FL — SIGNIFICANT CHANGE UP (ref 80–100)
NRBC # BLD: 0 /100 WBCS — SIGNIFICANT CHANGE UP
NRBC # FLD: 0 K/UL — SIGNIFICANT CHANGE UP
PHOSPHATE SERPL-MCNC: 2.8 MG/DL — SIGNIFICANT CHANGE UP (ref 2.5–4.5)
PLATELET # BLD AUTO: 239 K/UL — SIGNIFICANT CHANGE UP (ref 150–400)
POTASSIUM SERPL-MCNC: 3.8 MMOL/L — SIGNIFICANT CHANGE UP (ref 3.5–5.3)
POTASSIUM SERPL-SCNC: 3.8 MMOL/L — SIGNIFICANT CHANGE UP (ref 3.5–5.3)
PROT SERPL-MCNC: 5.2 G/DL — LOW (ref 6–8.3)
RBC # BLD: 2.68 M/UL — LOW (ref 4.2–5.8)
RBC # FLD: 15.8 % — HIGH (ref 10.3–14.5)
SODIUM SERPL-SCNC: 142 MMOL/L — SIGNIFICANT CHANGE UP (ref 135–145)
WBC # BLD: 11.07 K/UL — HIGH (ref 3.8–10.5)
WBC # FLD AUTO: 11.07 K/UL — HIGH (ref 3.8–10.5)

## 2022-07-08 PROCEDURE — 99233 SBSQ HOSP IP/OBS HIGH 50: CPT

## 2022-07-08 PROCEDURE — 99232 SBSQ HOSP IP/OBS MODERATE 35: CPT | Mod: GC

## 2022-07-08 RX ORDER — OXYCODONE HYDROCHLORIDE 5 MG/1
15 TABLET ORAL EVERY 4 HOURS
Refills: 0 | Status: DISCONTINUED | OUTPATIENT
Start: 2022-07-08 | End: 2022-07-09

## 2022-07-08 RX ADMIN — SODIUM CHLORIDE 75 MILLILITER(S): 9 INJECTION INTRAMUSCULAR; INTRAVENOUS; SUBCUTANEOUS at 05:22

## 2022-07-08 RX ADMIN — PANTOPRAZOLE SODIUM 40 MILLIGRAM(S): 20 TABLET, DELAYED RELEASE ORAL at 13:08

## 2022-07-08 RX ADMIN — OXYCODONE HYDROCHLORIDE 15 MILLIGRAM(S): 5 TABLET ORAL at 10:46

## 2022-07-08 RX ADMIN — OXYCODONE HYDROCHLORIDE 15 MILLIGRAM(S): 5 TABLET ORAL at 14:30

## 2022-07-08 RX ADMIN — OXYCODONE HYDROCHLORIDE 15 MILLIGRAM(S): 5 TABLET ORAL at 15:00

## 2022-07-08 RX ADMIN — HEPARIN SODIUM 5000 UNIT(S): 5000 INJECTION INTRAVENOUS; SUBCUTANEOUS at 05:22

## 2022-07-08 RX ADMIN — Medication 325 MILLIGRAM(S): at 13:08

## 2022-07-08 RX ADMIN — HYDROMORPHONE HYDROCHLORIDE 1.5 MILLIGRAM(S): 2 INJECTION INTRAMUSCULAR; INTRAVENOUS; SUBCUTANEOUS at 00:31

## 2022-07-08 RX ADMIN — OXYCODONE HYDROCHLORIDE 15 MILLIGRAM(S): 5 TABLET ORAL at 23:05

## 2022-07-08 RX ADMIN — HYDROMORPHONE HYDROCHLORIDE 1.5 MILLIGRAM(S): 2 INJECTION INTRAMUSCULAR; INTRAVENOUS; SUBCUTANEOUS at 00:46

## 2022-07-08 RX ADMIN — OXYCODONE HYDROCHLORIDE 15 MILLIGRAM(S): 5 TABLET ORAL at 20:00

## 2022-07-08 RX ADMIN — HYDROMORPHONE HYDROCHLORIDE 1.5 MILLIGRAM(S): 2 INJECTION INTRAMUSCULAR; INTRAVENOUS; SUBCUTANEOUS at 08:56

## 2022-07-08 RX ADMIN — HYDROMORPHONE HYDROCHLORIDE 1.5 MILLIGRAM(S): 2 INJECTION INTRAMUSCULAR; INTRAVENOUS; SUBCUTANEOUS at 04:31

## 2022-07-08 RX ADMIN — OXYCODONE HYDROCHLORIDE 15 MILLIGRAM(S): 5 TABLET ORAL at 10:16

## 2022-07-08 RX ADMIN — OXYCODONE HYDROCHLORIDE 15 MILLIGRAM(S): 5 TABLET ORAL at 18:58

## 2022-07-08 RX ADMIN — HYDROMORPHONE HYDROCHLORIDE 1.5 MILLIGRAM(S): 2 INJECTION INTRAMUSCULAR; INTRAVENOUS; SUBCUTANEOUS at 05:00

## 2022-07-08 RX ADMIN — Medication 500 MILLIGRAM(S): at 13:08

## 2022-07-08 NOTE — PROGRESS NOTE ADULT - SUBJECTIVE AND OBJECTIVE BOX
Chief Complaint:  Patient is a 61y old  Male who presents with a chief complaint of abdominal pain (07 Jul 2022 15:52)      Interval Events: no acute events overnight  - reports pain is better with eating and drinking      Hospital Medications:  acetaminophen     Tablet .. 650 milliGRAM(s) Oral every 6 hours PRN  aluminum hydroxide/magnesium hydroxide/simethicone Suspension 30 milliLiter(s) Oral every 4 hours PRN  ascorbic acid 500 milliGRAM(s) Oral daily  ferrous    sulfate 325 milliGRAM(s) Oral daily  heparin   Injectable 5000 Unit(s) SubCutaneous every 12 hours  HYDROmorphone  Injectable 1.5 milliGRAM(s) IV Push every 4 hours PRN  melatonin 3 milliGRAM(s) Oral at bedtime PRN  ondansetron Injectable 4 milliGRAM(s) IV Push every 8 hours PRN  pantoprazole  Injectable 40 milliGRAM(s) IV Push daily  sodium chloride 0.9%. 1000 milliLiter(s) IV Continuous <Continuous>      PMHX/PSHX:  ETOH abuse    Pancreatitis    Hepatitis C    Gout    HTN (hypertension)    Chronic kidney disease (CKD)    H/O chronic pancreatitis    Gout    Alcohol abuse    No significant past surgical history    Gastric perforation    Gastric perforation            ROS:     General:  No weight loss, fevers, chills, night sweats, fatigue   Eyes:  No vision changes  ENT:  No sore throat, pain, runny nose  CV:  No chest pain, palpitations, dizziness   Resp:  No SOB, cough, wheezing  GI:  See HPI  :  No burning with urination, hematuria  Muscle:  No pain, weakness  Neuro:  No weakness/tingling, memory problems  Psych:  No fatigue, insomnia, mood problems, depression  Heme:  No easy bruisability  Skin:  No rash, edema      PHYSICAL EXAM:     GENERAL:  cachectic, no distress  HEENT:  NC/AT,  conjunctivae clear, sclera anicteric, temporal wasting  CHEST:  Full & symmetric excursion, no increased effort w/ respirations  HEART:  Regular rhythm & rate  ABDOMEN:  Soft, tender to palpation in epigastric area  EXTREMITIES:  no LE  edema  SKIN:  No rash/erythema/ecchymoses/petechiae/wounds/jaundice  NEURO:  Alert, oriented    Vital Signs:  Vital Signs Last 24 Hrs  T(C): 36.7 (08 Jul 2022 05:20), Max: 37 (07 Jul 2022 20:20)  T(F): 98 (08 Jul 2022 05:20), Max: 98.6 (07 Jul 2022 20:20)  HR: 83 (08 Jul 2022 05:20) (74 - 92)  BP: 149/73 (08 Jul 2022 08:54) (117/66 - 149/73)  BP(mean): --  RR: 18 (08 Jul 2022 08:54) (16 - 18)  SpO2: 100% (08 Jul 2022 08:54) (98% - 100%)    Parameters below as of 08 Jul 2022 08:54    O2 Flow (L/min): 74    Daily     Daily     LABS:                        8.3    11.07 )-----------( 239      ( 08 Jul 2022 06:00 )             26.7     07-08    142  |  113<H>  |  46<H>  ----------------------------<  91  3.8   |  18<L>  |  2.31<H>    Ca    8.1<L>      08 Jul 2022 06:00  Phos  2.8     07-08  Mg     1.60     07-08    TPro  5.2<L>  /  Alb  2.8<L>  /  TBili  <0.2  /  DBili  x   /  AST  13  /  ALT  11  /  AlkPhos  71  07-08    LIVER FUNCTIONS - ( 08 Jul 2022 06:00 )  Alb: 2.8 g/dL / Pro: 5.2 g/dL / ALK PHOS: 71 U/L / ALT: 11 U/L / AST: 13 U/L / GGT: x                   Imaging:

## 2022-07-08 NOTE — PROGRESS NOTE ADULT - ASSESSMENT
61M with PMHx of chronic pancreatitis (complicated by chronic pain of which secondary to alcohol abuse), CKD4, hep C s/p SVR and benign esophageal stricture (post-stent 4/19/22 c/b need for CRE balloon dilation, reposition and suture on 4/29) presenting for acute-on-chronic epigastric pain for several days.    Impression:  #Chronic pancreatitis with PD dilation and stones seen on MR (4/20/22). Seen outpatient with plan to refer to surgery for evaluation for Puestow procedure  #Esophageal stricture likely 2/2 reflux s/p esophageal stent requiring dilation and reposition 4/29. Stent in place on CT, patient without dysphagia complaints at this time.    Recommendation:  - Appreciate surgery eval, f/u recs  - would not remove esophageal stent at this time, f/u with Dr Edwards in 1-2 months for removal ([phone])  - PPI daily  - pain control per primary team  - advance diet as tolerated    No further inpatient GI w/u indicated at this time. Please call back with questions.    Note not finalized until signed by attending.    Desire Larios PGY-6  Gastroenterology/Hepatology Fellow  Pager #[phone] (RYAN) or [phone] (NS)  Available on [employer] Teams.  Please contact on-call GI fellow via answering service ([phone]) after 5pm and before 8am, and on weekends.

## 2022-07-08 NOTE — PROGRESS NOTE ADULT - PROBLEM SELECTOR PLAN 2
Patient with esophageal stricture s/p stent   -GI recs appreciated. Would not remove stent at this time   -C/w PPI daily   -Further follow up as OP.

## 2022-07-08 NOTE — PROGRESS NOTE ADULT - PROBLEM SELECTOR PLAN 3
YUNIEL on CKD4, most likely due to decreased PO intake  -Cr improved to baseline.  -Stopping IVF.  -UA is grossly normal   -Trend Scr daily

## 2022-07-08 NOTE — PROGRESS NOTE ADULT - SUBJECTIVE AND OBJECTIVE BOX
[facility] Division of Hospital Medicine  Abram Rao) MD Chava  Pager [phone]    SUBJECTIVE:  Chief complaint: Abd pain.    Pt seen and evaluated at bedside. Reports similar abdominal pain. States IV meds have not helped him. Requested PO meds as that is what he thinks work. Tolerating PO. No f/c, NV.      ROS: All systems negative except as noted.      Vital Signs Last 24 Hrs  T(C): 36.8 (08 Jul 2022 12:20), Max: 37 (07 Jul 2022 20:20)  T(F): 98.2 (08 Jul 2022 12:20), Max: 98.6 (07 Jul 2022 20:20)  HR: 73 (08 Jul 2022 12:20) (73 - 90)  BP: 146/85 (08 Jul 2022 12:20) (117/66 - 149/73)  BP(mean): --  RR: 18 (08 Jul 2022 12:20) (16 - 18)  SpO2: 98% (08 Jul 2022 12:20) (98% - 100%)    Parameters below as of 08 Jul 2022 12:20  Patient On (Oxygen Delivery Method): room air      PHYSICAL EXAM:  Gen- In bed, comfortable, NAD  Eyes- EOMI, PERRLA, nonicteric.  EMNT- Fair dentition. Dry MM. No tonsilar exudates. No posterior pharynx erythema.  Neck- Supple. No masses. No tracheal deviation.  Resp- CTAB, good effort. No r/r/w. No accessory muscle use.  CVS- RRR, S1S2, no g/r/m. No LE edema.  GI- Soft abd, tenderness to deep palp.  +BSx4. No HSM.  MSK- No C/C. ROM intact. No crepitus.  Neuro- CN II-XII intact. Speech fluent/face symmetric. Sensation intact.  Skin- No rashes/ulcers. Warm/moist.  Psych- AAOx3. Appropriate mood/affect.      MEDICATION:  MEDICATIONS  (STANDING):  ascorbic acid 500 milliGRAM(s) Oral daily  ferrous    sulfate 325 milliGRAM(s) Oral daily  heparin   Injectable 5000 Unit(s) SubCutaneous every 12 hours  pantoprazole  Injectable 40 milliGRAM(s) IV Push daily  sodium chloride 0.9%. 1000 milliLiter(s) (75 mL/Hr) IV Continuous <Continuous>    MEDICATIONS  (PRN):  acetaminophen     Tablet .. 650 milliGRAM(s) Oral every 6 hours PRN Temp greater or equal to 38C (100.4F), Mild Pain (1 - 3)  aluminum hydroxide/magnesium hydroxide/simethicone Suspension 30 milliLiter(s) Oral every 4 hours PRN Dyspepsia  HYDROmorphone  Injectable 1.5 milliGRAM(s) IV Push every 4 hours PRN Severe Pain (7 - 10)  melatonin 3 milliGRAM(s) Oral at bedtime PRN Insomnia  ondansetron Injectable 4 milliGRAM(s) IV Push every 8 hours PRN Nausea and/or Vomiting  oxyCODONE    IR 15 milliGRAM(s) Oral every 4 hours PRN Moderate Pain (4 - 6)        LABORATORY:                          8.3    11.07 )-----------( 239      ( 08 Jul 2022 06:00 )             26.7     07-08    142  |  113<H>  |  46<H>  ----------------------------<  91  3.8   |  18<L>  |  2.31<H>    Ca    8.1<L>      08 Jul 2022 06:00  Phos  2.8     07-08  Mg     1.60     07-08    TPro  5.2<L>  /  Alb  2.8<L>  /  TBili  <0.2  /  DBili  x   /  AST  13  /  ALT  11  /  AlkPhos  71  07-08      SARS-CoV-2: NotDetec (06 Jul 2022 15:07)  COVID-19 PCR: NotDetec (18 Apr 2022 15:26)  SARS-CoV-2: NotDetec (14 Apr 2022 21:11)  COVID-19 PCR: NotDetec (27 Jan 2022 16:46)

## 2022-07-08 NOTE — PROGRESS NOTE ADULT - PROBLEM SELECTOR PLAN 1
Patient with acute on chronic epigastric pain, found to have chronic pancreatitis with stones in the pancreatic duct on CT A/P   -Lipase and LFTs is WNL, low suspicion for acute pancreatitis or cholangitis.   -GI recs appreciated. C/w PPI daily  -Surgery consulted for evaluation for possible Puestow procedure  -Cont IV dilaudid PRN. Add PO oxycodone PRN.   -Currently on a regular diet. Monitor for intolerance.

## 2022-07-08 NOTE — PROGRESS NOTE ADULT - ASSESSMENT
61M with PMHx of chronic pancreatitis (complicated by chronic pain of which secondary to alcohol abuse), CKD4, hep C s/p SVR and benign esophageal stricture (post-stent 4/19/22 c/b need for CRE balloon dilation, reposition and suture on 4/29) presents with epigastric pain, most likely due to chronic pancreatic duct obstruction. Surgery on board. Anticipate intervention next wk.

## 2022-07-09 LAB
ALBUMIN SERPL ELPH-MCNC: 2.8 G/DL — LOW (ref 3.3–5)
ALP SERPL-CCNC: 76 U/L — SIGNIFICANT CHANGE UP (ref 40–120)
ALT FLD-CCNC: 8 U/L — SIGNIFICANT CHANGE UP (ref 4–41)
ANION GAP SERPL CALC-SCNC: 11 MMOL/L — SIGNIFICANT CHANGE UP (ref 7–14)
AST SERPL-CCNC: 11 U/L — SIGNIFICANT CHANGE UP (ref 4–40)
BILIRUB SERPL-MCNC: <0.2 MG/DL — SIGNIFICANT CHANGE UP (ref 0.2–1.2)
BUN SERPL-MCNC: 44 MG/DL — HIGH (ref 7–23)
CALCIUM SERPL-MCNC: 8.2 MG/DL — LOW (ref 8.4–10.5)
CHLORIDE SERPL-SCNC: 113 MMOL/L — HIGH (ref 98–107)
CO2 SERPL-SCNC: 17 MMOL/L — LOW (ref 22–31)
CREAT SERPL-MCNC: 2.41 MG/DL — HIGH (ref 0.5–1.3)
EGFR: 30 ML/MIN/1.73M2 — LOW
GLUCOSE SERPL-MCNC: 124 MG/DL — HIGH (ref 70–99)
HCT VFR BLD CALC: 29.1 % — LOW (ref 39–50)
HGB BLD-MCNC: 8.9 G/DL — LOW (ref 13–17)
MCHC RBC-ENTMCNC: 30.6 GM/DL — LOW (ref 32–36)
MCHC RBC-ENTMCNC: 30.9 PG — SIGNIFICANT CHANGE UP (ref 27–34)
MCV RBC AUTO: 101 FL — HIGH (ref 80–100)
NRBC # BLD: 0 /100 WBCS — SIGNIFICANT CHANGE UP
NRBC # FLD: 0 K/UL — SIGNIFICANT CHANGE UP
PLATELET # BLD AUTO: 261 K/UL — SIGNIFICANT CHANGE UP (ref 150–400)
POTASSIUM SERPL-MCNC: 4.6 MMOL/L — SIGNIFICANT CHANGE UP (ref 3.5–5.3)
POTASSIUM SERPL-SCNC: 4.6 MMOL/L — SIGNIFICANT CHANGE UP (ref 3.5–5.3)
PROT SERPL-MCNC: 5.4 G/DL — LOW (ref 6–8.3)
RBC # BLD: 2.88 M/UL — LOW (ref 4.2–5.8)
RBC # FLD: 15.5 % — HIGH (ref 10.3–14.5)
SODIUM SERPL-SCNC: 141 MMOL/L — SIGNIFICANT CHANGE UP (ref 135–145)
WBC # BLD: 8.79 K/UL — SIGNIFICANT CHANGE UP (ref 3.8–10.5)
WBC # FLD AUTO: 8.79 K/UL — SIGNIFICANT CHANGE UP (ref 3.8–10.5)

## 2022-07-09 PROCEDURE — 99233 SBSQ HOSP IP/OBS HIGH 50: CPT

## 2022-07-09 RX ORDER — OXYCODONE HYDROCHLORIDE 5 MG/1
15 TABLET ORAL EVERY 4 HOURS
Refills: 0 | Status: DISCONTINUED | OUTPATIENT
Start: 2022-07-09 | End: 2022-07-16

## 2022-07-09 RX ORDER — OXYCODONE HYDROCHLORIDE 5 MG/1
10 TABLET ORAL ONCE
Refills: 0 | Status: DISCONTINUED | OUTPATIENT
Start: 2022-07-09 | End: 2022-07-09

## 2022-07-09 RX ORDER — OXYCODONE HYDROCHLORIDE 5 MG/1
10 TABLET ORAL EVERY 4 HOURS
Refills: 0 | Status: DISCONTINUED | OUTPATIENT
Start: 2022-07-09 | End: 2022-07-14

## 2022-07-09 RX ADMIN — OXYCODONE HYDROCHLORIDE 15 MILLIGRAM(S): 5 TABLET ORAL at 12:34

## 2022-07-09 RX ADMIN — OXYCODONE HYDROCHLORIDE 15 MILLIGRAM(S): 5 TABLET ORAL at 07:48

## 2022-07-09 RX ADMIN — OXYCODONE HYDROCHLORIDE 15 MILLIGRAM(S): 5 TABLET ORAL at 03:08

## 2022-07-09 RX ADMIN — HEPARIN SODIUM 5000 UNIT(S): 5000 INJECTION INTRAVENOUS; SUBCUTANEOUS at 05:36

## 2022-07-09 RX ADMIN — OXYCODONE HYDROCHLORIDE 15 MILLIGRAM(S): 5 TABLET ORAL at 17:39

## 2022-07-09 RX ADMIN — OXYCODONE HYDROCHLORIDE 15 MILLIGRAM(S): 5 TABLET ORAL at 13:00

## 2022-07-09 RX ADMIN — HEPARIN SODIUM 5000 UNIT(S): 5000 INJECTION INTRAVENOUS; SUBCUTANEOUS at 17:40

## 2022-07-09 RX ADMIN — Medication 500 MILLIGRAM(S): at 12:37

## 2022-07-09 RX ADMIN — OXYCODONE HYDROCHLORIDE 10 MILLIGRAM(S): 5 TABLET ORAL at 20:22

## 2022-07-09 RX ADMIN — OXYCODONE HYDROCHLORIDE 15 MILLIGRAM(S): 5 TABLET ORAL at 22:55

## 2022-07-09 RX ADMIN — OXYCODONE HYDROCHLORIDE 15 MILLIGRAM(S): 5 TABLET ORAL at 22:25

## 2022-07-09 RX ADMIN — OXYCODONE HYDROCHLORIDE 15 MILLIGRAM(S): 5 TABLET ORAL at 08:29

## 2022-07-09 RX ADMIN — OXYCODONE HYDROCHLORIDE 15 MILLIGRAM(S): 5 TABLET ORAL at 00:02

## 2022-07-09 RX ADMIN — Medication 3 MILLIGRAM(S): at 12:36

## 2022-07-09 RX ADMIN — PANTOPRAZOLE SODIUM 40 MILLIGRAM(S): 20 TABLET, DELAYED RELEASE ORAL at 12:37

## 2022-07-09 RX ADMIN — OXYCODONE HYDROCHLORIDE 15 MILLIGRAM(S): 5 TABLET ORAL at 18:15

## 2022-07-09 RX ADMIN — OXYCODONE HYDROCHLORIDE 15 MILLIGRAM(S): 5 TABLET ORAL at 03:45

## 2022-07-09 RX ADMIN — OXYCODONE HYDROCHLORIDE 10 MILLIGRAM(S): 5 TABLET ORAL at 20:50

## 2022-07-09 RX ADMIN — Medication 325 MILLIGRAM(S): at 12:38

## 2022-07-09 NOTE — PROGRESS NOTE ADULT - PROBLEM SELECTOR PLAN 3
YUNIEL on CKD4, most likely due to decreased PO intake  -Cr improved to baseline.  -UA is grossly normal   -Trend Scr daily

## 2022-07-09 NOTE — PROGRESS NOTE ADULT - SUBJECTIVE AND OBJECTIVE BOX
[facility] Division of Hospital Medicine  Abram ZEPEDA ([place]) MD Chava  Pager [phone]    SUBJECTIVE:  Chief complaint: Abd pain.    Pt seen and evaluated at bedside. No o/n events. Still has abdominal pain. He states he's tolerating PO intake. He states he still has uncontrolled pain. Otherwise, no f/c, NV, SOB, CP.      ROS: All systems negative except as noted.      Vital Signs Last 24 Hrs  T(C): 37.1 (09 Jul 2022 05:35), Max: 37.1 (09 Jul 2022 05:35)  T(F): 98.8 (09 Jul 2022 05:35), Max: 98.8 (09 Jul 2022 05:35)  HR: 87 (09 Jul 2022 05:35) (77 - 87)  BP: 111/78 (09 Jul 2022 05:35) (111/78 - 146/78)  BP(mean): --  RR: 17 (09 Jul 2022 05:35) (17 - 17)  SpO2: 98% (09 Jul 2022 05:35) (98% - 98%)    Parameters below as of 09 Jul 2022 05:35  Patient On (Oxygen Delivery Method): room air      PHYSICAL EXAM:  Gen- In bed, comfortable, NAD  Eyes- EOMI, PERRLA, nonicteric.  EMNT- Fair dentition. Dry MM. No tonsilar exudates. No posterior pharynx erythema.  Neck- Supple. No masses. No tracheal deviation.  Resp- CTAB, good effort. No r/r/w. No accessory muscle use.  CVS- RRR, S1S2, no g/r/m. No LE edema.  GI- Soft abd, tenderness to deep palp.  +BSx4. No HSM.  MSK- No C/C. ROM intact. No crepitus.  Neuro- CN II-XII intact. Speech fluent/face symmetric. Sensation intact.  Skin- No rashes/ulcers. Warm/moist.  Psych- AAOx3. Appropriate mood/affect.      MEDICATION:  MEDICATIONS  (STANDING):  ascorbic acid 500 milliGRAM(s) Oral daily  ferrous    sulfate 325 milliGRAM(s) Oral daily  heparin   Injectable 5000 Unit(s) SubCutaneous every 12 hours  pantoprazole  Injectable 40 milliGRAM(s) IV Push daily    MEDICATIONS  (PRN):  acetaminophen     Tablet .. 650 milliGRAM(s) Oral every 6 hours PRN Temp greater or equal to 38C (100.4F), Mild Pain (1 - 3)  aluminum hydroxide/magnesium hydroxide/simethicone Suspension 30 milliLiter(s) Oral every 4 hours PRN Dyspepsia  HYDROmorphone  Injectable 1.5 milliGRAM(s) IV Push every 4 hours PRN Severe Pain (7 - 10)  melatonin 3 milliGRAM(s) Oral at bedtime PRN Insomnia  ondansetron Injectable 4 milliGRAM(s) IV Push every 8 hours PRN Nausea and/or Vomiting  oxyCODONE    IR 15 milliGRAM(s) Oral every 4 hours PRN Moderate Pain (4 - 6)        LABORATORY:                          8.9    8.79  )-----------( 261      ( 09 Jul 2022 06:15 )             29.1     07-09    141  |  113<H>  |  44<H>  ----------------------------<  124<H>  4.6   |  17<L>  |  2.41<H>    Ca    8.2<L>      09 Jul 2022 06:15  Phos  2.8     07-08  Mg     1.60     07-08    TPro  5.4<L>  /  Alb  2.8<L>  /  TBili  <0.2  /  DBili  x   /  AST  11  /  ALT  8   /  AlkPhos  76  07-09              SARS-CoV-2: NotDetec (06 Jul 2022 15:07)  COVID-19 PCR: NotDetec (18 Apr 2022 15:26)  SARS-CoV-2: NotDetec (14 Apr 2022 21:11)  COVID-19 PCR: NotDetec (27 Jan 2022 16:46)

## 2022-07-09 NOTE — PROGRESS NOTE ADULT - PROBLEM SELECTOR PLAN 1
Patient with acute on chronic epigastric pain, found to have chronic pancreatitis with stones in the pancreatic duct on CT A/P   -Lipase and LFTs is WNL, low suspicion for acute pancreatitis or cholangitis.   -GI recs appreciated. C/w PPI daily  -Surgery consulted for evaluation for possible Puestow procedure  -Cont PO oxycodone PRN for pain control. Will DC IV dilaudid as pt states it doesn't work.   -Consider start oxycontin for extended release to reduce reliance on PRN. Will re-eval.   -Currently on a regular diet. Monitor for intolerance.

## 2022-07-10 LAB
ALBUMIN SERPL ELPH-MCNC: 2.9 G/DL — LOW (ref 3.3–5)
ALP SERPL-CCNC: 80 U/L — SIGNIFICANT CHANGE UP (ref 40–120)
ALT FLD-CCNC: 9 U/L — SIGNIFICANT CHANGE UP (ref 4–41)
ANION GAP SERPL CALC-SCNC: 12 MMOL/L — SIGNIFICANT CHANGE UP (ref 7–14)
AST SERPL-CCNC: 18 U/L — SIGNIFICANT CHANGE UP (ref 4–40)
BILIRUB SERPL-MCNC: <0.2 MG/DL — SIGNIFICANT CHANGE UP (ref 0.2–1.2)
BUN SERPL-MCNC: 39 MG/DL — HIGH (ref 7–23)
CALCIUM SERPL-MCNC: 8.3 MG/DL — LOW (ref 8.4–10.5)
CHLORIDE SERPL-SCNC: 112 MMOL/L — HIGH (ref 98–107)
CO2 SERPL-SCNC: 17 MMOL/L — LOW (ref 22–31)
CREAT SERPL-MCNC: 2.3 MG/DL — HIGH (ref 0.5–1.3)
EGFR: 31 ML/MIN/1.73M2 — LOW
GLUCOSE SERPL-MCNC: 165 MG/DL — HIGH (ref 70–99)
HCT VFR BLD CALC: 31.2 % — LOW (ref 39–50)
HGB BLD-MCNC: 9.5 G/DL — LOW (ref 13–17)
MCHC RBC-ENTMCNC: 30.4 GM/DL — LOW (ref 32–36)
MCHC RBC-ENTMCNC: 31.4 PG — SIGNIFICANT CHANGE UP (ref 27–34)
MCV RBC AUTO: 103 FL — HIGH (ref 80–100)
NRBC # BLD: 0 /100 WBCS — SIGNIFICANT CHANGE UP
NRBC # FLD: 0 K/UL — SIGNIFICANT CHANGE UP
PLATELET # BLD AUTO: 239 K/UL — SIGNIFICANT CHANGE UP (ref 150–400)
POTASSIUM SERPL-MCNC: 4.1 MMOL/L — SIGNIFICANT CHANGE UP (ref 3.5–5.3)
POTASSIUM SERPL-SCNC: 4.1 MMOL/L — SIGNIFICANT CHANGE UP (ref 3.5–5.3)
PROT SERPL-MCNC: 5.9 G/DL — LOW (ref 6–8.3)
RBC # BLD: 3.03 M/UL — LOW (ref 4.2–5.8)
RBC # FLD: 15.8 % — HIGH (ref 10.3–14.5)
SODIUM SERPL-SCNC: 141 MMOL/L — SIGNIFICANT CHANGE UP (ref 135–145)
WBC # BLD: 7.8 K/UL — SIGNIFICANT CHANGE UP (ref 3.8–10.5)
WBC # FLD AUTO: 7.8 K/UL — SIGNIFICANT CHANGE UP (ref 3.8–10.5)

## 2022-07-10 PROCEDURE — 99233 SBSQ HOSP IP/OBS HIGH 50: CPT

## 2022-07-10 RX ORDER — PANTOPRAZOLE SODIUM 20 MG/1
40 TABLET, DELAYED RELEASE ORAL
Refills: 0 | Status: DISCONTINUED | OUTPATIENT
Start: 2022-07-10 | End: 2022-07-20

## 2022-07-10 RX ORDER — SIMETHICONE 80 MG/1
80 TABLET, CHEWABLE ORAL ONCE
Refills: 0 | Status: COMPLETED | OUTPATIENT
Start: 2022-07-10 | End: 2022-07-10

## 2022-07-10 RX ORDER — OXYCODONE HYDROCHLORIDE 5 MG/1
15 TABLET ORAL EVERY 12 HOURS
Refills: 0 | Status: DISCONTINUED | OUTPATIENT
Start: 2022-07-10 | End: 2022-07-17

## 2022-07-10 RX ORDER — SODIUM BICARBONATE 1 MEQ/ML
650 SYRINGE (ML) INTRAVENOUS
Refills: 0 | Status: DISCONTINUED | OUTPATIENT
Start: 2022-07-10 | End: 2022-07-18

## 2022-07-10 RX ADMIN — OXYCODONE HYDROCHLORIDE 15 MILLIGRAM(S): 5 TABLET ORAL at 21:15

## 2022-07-10 RX ADMIN — OXYCODONE HYDROCHLORIDE 15 MILLIGRAM(S): 5 TABLET ORAL at 17:15

## 2022-07-10 RX ADMIN — Medication 325 MILLIGRAM(S): at 12:19

## 2022-07-10 RX ADMIN — Medication 650 MILLIGRAM(S): at 18:37

## 2022-07-10 RX ADMIN — Medication 30 MILLILITER(S): at 23:04

## 2022-07-10 RX ADMIN — OXYCODONE HYDROCHLORIDE 15 MILLIGRAM(S): 5 TABLET ORAL at 03:10

## 2022-07-10 RX ADMIN — OXYCODONE HYDROCHLORIDE 15 MILLIGRAM(S): 5 TABLET ORAL at 10:02

## 2022-07-10 RX ADMIN — OXYCODONE HYDROCHLORIDE 15 MILLIGRAM(S): 5 TABLET ORAL at 12:18

## 2022-07-10 RX ADMIN — Medication 500 MILLIGRAM(S): at 12:19

## 2022-07-10 RX ADMIN — OXYCODONE HYDROCHLORIDE 15 MILLIGRAM(S): 5 TABLET ORAL at 07:41

## 2022-07-10 RX ADMIN — OXYCODONE HYDROCHLORIDE 15 MILLIGRAM(S): 5 TABLET ORAL at 22:24

## 2022-07-10 RX ADMIN — PANTOPRAZOLE SODIUM 40 MILLIGRAM(S): 20 TABLET, DELAYED RELEASE ORAL at 10:02

## 2022-07-10 RX ADMIN — SIMETHICONE 80 MILLIGRAM(S): 80 TABLET, CHEWABLE ORAL at 23:04

## 2022-07-10 RX ADMIN — OXYCODONE HYDROCHLORIDE 15 MILLIGRAM(S): 5 TABLET ORAL at 08:30

## 2022-07-10 RX ADMIN — OXYCODONE HYDROCHLORIDE 15 MILLIGRAM(S): 5 TABLET ORAL at 13:00

## 2022-07-10 RX ADMIN — OXYCODONE HYDROCHLORIDE 15 MILLIGRAM(S): 5 TABLET ORAL at 20:40

## 2022-07-10 RX ADMIN — HEPARIN SODIUM 5000 UNIT(S): 5000 INJECTION INTRAVENOUS; SUBCUTANEOUS at 18:40

## 2022-07-10 RX ADMIN — OXYCODONE HYDROCHLORIDE 15 MILLIGRAM(S): 5 TABLET ORAL at 02:31

## 2022-07-10 RX ADMIN — OXYCODONE HYDROCHLORIDE 15 MILLIGRAM(S): 5 TABLET ORAL at 16:36

## 2022-07-10 NOTE — PROGRESS NOTE ADULT - PROBLEM SELECTOR PLAN 1
Patient with acute on chronic epigastric pain, found to have chronic pancreatitis with stones in the pancreatic duct on CT A/P   -Lipase and LFTs is WNL, low suspicion for acute pancreatitis or cholangitis.   -GI recs appreciated. C/w PPI daily  -Surgery consulted for evaluation for possible Puestow procedure - d/w surgery on Fri - they are still in planning stages.  -Pain seems controlled on exam. Though pt reports poor control. Based on observation, his tolerance of PO/good appetite, those do not correlate to his degree of pain reported.  -Cont PO oxycodone PRN for pain control. Will DC IV dilaudid as pt states it doesn't work.   -Added oxycontin 30 ER q12.  -Currently on a regular diet. Monitor for intolerance - continues to tolerate w/o issues.  -Pain management c/s in AM.

## 2022-07-10 NOTE — CHART NOTE - NSCHARTNOTEFT_GEN_A_CORE
Called by nurse for right arm IV infiltration    Pt is c/o right forearm pain,   VSS  Right forearm swelling, cool and blanching, and mild tenderness above the I.V. site.  FROM, + 2 Pulses, capillary refill < 2 seconds   Saline lock was removed and dressing was changed  arm elevation and warm compress applied   Another I.V. line to be inserted at a different location to continue infusion.   Pt was advised to report any worsening swelling or worsening tenderness at the I.V. site.  Will monitor the site frequently.

## 2022-07-10 NOTE — PROGRESS NOTE ADULT - SUBJECTIVE AND OBJECTIVE BOX
[facility] Division of Hospital Medicine  Abram Rao) MD Chava  Pager [phone]    SUBJECTIVE:  Chief complaint: Abd pain.    Pt seen and evaluated at bedside. No o/n events. He is tolerating PO intake w/o issues. He states that eating does not trigger his abdominal pain. He appetite is good. He attributed his pain to the metal stent.      ROS: All systems negative except as noted.      Vital Signs Last 24 Hrs  T(C): 36.8 (10 Jul 2022 05:29), Max: 37.1 (09 Jul 2022 20:20)  T(F): 98.3 (10 Jul 2022 05:29), Max: 98.7 (09 Jul 2022 20:20)  HR: 91 (10 Jul 2022 05:29) (78 - 91)  BP: 129/66 (10 Jul 2022 05:29) (111/64 - 129/66)  BP(mean): --  RR: 17 (10 Jul 2022 05:29) (17 - 18)  SpO2: 100% (10 Jul 2022 05:29) (99% - 100%)    Parameters below as of 10 Jul 2022 05:29  Patient On (Oxygen Delivery Method): room air        PHYSICAL EXAM:  Gen- In bed, comfortable, NAD  Eyes- EOMI, PERRLA, nonicteric.  EMNT- Fair dentition. Dry MM. No tonsilar exudates. No posterior pharynx erythema.  Neck- Supple. No masses. No tracheal deviation.  Resp- CTAB, good effort. No r/r/w. No accessory muscle use.  CVS- RRR, S1S2, no g/r/m. No LE edema.  GI- Soft abd, tenderness to deep palp.  +BSx4. No HSM.      MEDICATIONS  (STANDING):  ascorbic acid 500 milliGRAM(s) Oral daily  ferrous    sulfate 325 milliGRAM(s) Oral daily  heparin   Injectable 5000 Unit(s) SubCutaneous every 12 hours  oxyCODONE  ER Tablet 15 milliGRAM(s) Oral every 12 hours  pantoprazole    Tablet 40 milliGRAM(s) Oral before breakfast  sodium bicarbonate 650 milliGRAM(s) Oral two times a day    MEDICATIONS  (PRN):  acetaminophen     Tablet .. 650 milliGRAM(s) Oral every 6 hours PRN Temp greater or equal to 38C (100.4F), Mild Pain (1 - 3)  aluminum hydroxide/magnesium hydroxide/simethicone Suspension 30 milliLiter(s) Oral every 4 hours PRN Dyspepsia  melatonin 3 milliGRAM(s) Oral at bedtime PRN Insomnia  ondansetron Injectable 4 milliGRAM(s) IV Push every 8 hours PRN Nausea and/or Vomiting  oxyCODONE    IR 10 milliGRAM(s) Oral every 4 hours PRN Moderate Pain (4 - 6)  oxyCODONE    IR 15 milliGRAM(s) Oral every 4 hours PRN Severe Pain (7 - 10)            Labs:                    9.5    7.80  )-----------( 239      ( 10 Jul 2022 06:00 )             31.2     07-10    141  |  112<H>  |  39<H>  ----------------------------<  165<H>  4.1   |  17<L>  |  2.30<H>    Ca    8.3<L>      10 Jul 2022 06:00    TPro  5.9<L>  /  Alb  2.9<L>  /  TBili  <0.2  /  DBili  x   /  AST  18  /  ALT  9   /  AlkPhos  80  07-10              SARS-CoV-2: NotDetec (06 Jul 2022 15:07)  COVID-19 PCR: NotDetec (18 Apr 2022 15:26)  SARS-CoV-2: NotDetec (14 Apr 2022 21:11)  COVID-19 PCR: NotDetec (27 Jan 2022 16:46)

## 2022-07-10 NOTE — PROGRESS NOTE ADULT - PROBLEM SELECTOR PLAN 3
YUNIEL on CKD4, most likely due to decreased PO intake  -Cr improved to baseline.  -UA is grossly normal   -Trend Scr daily YUNIEL on CKD4, most likely due to decreased PO intake  -Cr improved to baseline.  -Non gap acidosis likely 2' CKD. Start bicarb  -UA is grossly normal   -Trend Scr daily

## 2022-07-11 LAB
ALBUMIN SERPL ELPH-MCNC: 3 G/DL — LOW (ref 3.3–5)
ALP SERPL-CCNC: 74 U/L — SIGNIFICANT CHANGE UP (ref 40–120)
ALT FLD-CCNC: 8 U/L — SIGNIFICANT CHANGE UP (ref 4–41)
ANION GAP SERPL CALC-SCNC: 11 MMOL/L — SIGNIFICANT CHANGE UP (ref 7–14)
AST SERPL-CCNC: 11 U/L — SIGNIFICANT CHANGE UP (ref 4–40)
BILIRUB SERPL-MCNC: <0.2 MG/DL — SIGNIFICANT CHANGE UP (ref 0.2–1.2)
BUN SERPL-MCNC: 37 MG/DL — HIGH (ref 7–23)
CALCIUM SERPL-MCNC: 8.3 MG/DL — LOW (ref 8.4–10.5)
CHLORIDE SERPL-SCNC: 111 MMOL/L — HIGH (ref 98–107)
CO2 SERPL-SCNC: 18 MMOL/L — LOW (ref 22–31)
CREAT SERPL-MCNC: 2.2 MG/DL — HIGH (ref 0.5–1.3)
EGFR: 33 ML/MIN/1.73M2 — LOW
GLUCOSE SERPL-MCNC: 155 MG/DL — HIGH (ref 70–99)
HCT VFR BLD CALC: 28.5 % — LOW (ref 39–50)
HGB BLD-MCNC: 9 G/DL — LOW (ref 13–17)
MCHC RBC-ENTMCNC: 31.6 GM/DL — LOW (ref 32–36)
MCHC RBC-ENTMCNC: 31.7 PG — SIGNIFICANT CHANGE UP (ref 27–34)
MCV RBC AUTO: 100.4 FL — HIGH (ref 80–100)
NRBC # BLD: 0 /100 WBCS — SIGNIFICANT CHANGE UP
NRBC # FLD: 0 K/UL — SIGNIFICANT CHANGE UP
PLATELET # BLD AUTO: 255 K/UL — SIGNIFICANT CHANGE UP (ref 150–400)
POTASSIUM SERPL-MCNC: 4.1 MMOL/L — SIGNIFICANT CHANGE UP (ref 3.5–5.3)
POTASSIUM SERPL-SCNC: 4.1 MMOL/L — SIGNIFICANT CHANGE UP (ref 3.5–5.3)
PROT SERPL-MCNC: 5.7 G/DL — LOW (ref 6–8.3)
RBC # BLD: 2.84 M/UL — LOW (ref 4.2–5.8)
RBC # FLD: 15.6 % — HIGH (ref 10.3–14.5)
SODIUM SERPL-SCNC: 140 MMOL/L — SIGNIFICANT CHANGE UP (ref 135–145)
WBC # BLD: 7.99 K/UL — SIGNIFICANT CHANGE UP (ref 3.8–10.5)
WBC # FLD AUTO: 7.99 K/UL — SIGNIFICANT CHANGE UP (ref 3.8–10.5)

## 2022-07-11 PROCEDURE — 99233 SBSQ HOSP IP/OBS HIGH 50: CPT

## 2022-07-11 RX ADMIN — Medication 500 MILLIGRAM(S): at 12:16

## 2022-07-11 RX ADMIN — OXYCODONE HYDROCHLORIDE 15 MILLIGRAM(S): 5 TABLET ORAL at 00:51

## 2022-07-11 RX ADMIN — OXYCODONE HYDROCHLORIDE 15 MILLIGRAM(S): 5 TABLET ORAL at 11:00

## 2022-07-11 RX ADMIN — OXYCODONE HYDROCHLORIDE 15 MILLIGRAM(S): 5 TABLET ORAL at 15:10

## 2022-07-11 RX ADMIN — Medication 30 MILLILITER(S): at 05:02

## 2022-07-11 RX ADMIN — OXYCODONE HYDROCHLORIDE 15 MILLIGRAM(S): 5 TABLET ORAL at 01:30

## 2022-07-11 RX ADMIN — Medication 650 MILLIGRAM(S): at 17:56

## 2022-07-11 RX ADMIN — OXYCODONE HYDROCHLORIDE 15 MILLIGRAM(S): 5 TABLET ORAL at 22:18

## 2022-07-11 RX ADMIN — HEPARIN SODIUM 5000 UNIT(S): 5000 INJECTION INTRAVENOUS; SUBCUTANEOUS at 04:58

## 2022-07-11 RX ADMIN — Medication 325 MILLIGRAM(S): at 12:16

## 2022-07-11 RX ADMIN — OXYCODONE HYDROCHLORIDE 15 MILLIGRAM(S): 5 TABLET ORAL at 16:10

## 2022-07-11 RX ADMIN — HEPARIN SODIUM 5000 UNIT(S): 5000 INJECTION INTRAVENOUS; SUBCUTANEOUS at 17:56

## 2022-07-11 RX ADMIN — PANTOPRAZOLE SODIUM 40 MILLIGRAM(S): 20 TABLET, DELAYED RELEASE ORAL at 04:58

## 2022-07-11 RX ADMIN — Medication 650 MILLIGRAM(S): at 04:57

## 2022-07-11 RX ADMIN — OXYCODONE HYDROCHLORIDE 15 MILLIGRAM(S): 5 TABLET ORAL at 10:00

## 2022-07-11 RX ADMIN — OXYCODONE HYDROCHLORIDE 15 MILLIGRAM(S): 5 TABLET ORAL at 20:24

## 2022-07-11 RX ADMIN — OXYCODONE HYDROCHLORIDE 15 MILLIGRAM(S): 5 TABLET ORAL at 04:57

## 2022-07-11 RX ADMIN — OXYCODONE HYDROCHLORIDE 15 MILLIGRAM(S): 5 TABLET ORAL at 12:17

## 2022-07-11 RX ADMIN — Medication 30 MILLILITER(S): at 23:23

## 2022-07-11 RX ADMIN — OXYCODONE HYDROCHLORIDE 15 MILLIGRAM(S): 5 TABLET ORAL at 21:00

## 2022-07-11 RX ADMIN — OXYCODONE HYDROCHLORIDE 15 MILLIGRAM(S): 5 TABLET ORAL at 05:35

## 2022-07-11 NOTE — PROGRESS NOTE ADULT - PROBLEM SELECTOR PLAN 1
Patient with acute on chronic epigastric pain, found to have chronic pancreatitis with stones in the pancreatic duct on CT A/P   -Lipase and LFTs is WNL, low suspicion for acute pancreatitis or cholangitis.   -GI recs appreciated. C/w PPI daily  -Surgery consulted for evaluation for possible Puestow procedure - awaiting final date   -Pain seems controlled on exam. Though pt reports poor control. Based on observation, his tolerance of PO/good appetite, those do not correlate to his degree of pain reported.  - Cont Oxy ER 15 BID + Oxy 10/15 PRN pain   [ ] Pain medicine c/s   -Currently on a regular diet. Monitor for intolerance - continues to tolerate w/o issues.  -Pain management c/s in AM.

## 2022-07-11 NOTE — PROGRESS NOTE ADULT - ASSESSMENT
61M with PMHx of chronic pancreatitis (complicated by chronic pain of which secondary to alcohol abuse), CKD4, hep C s/p SVR and benign esophageal stricture (post-stent 4/19/22 c/b need for CRE balloon dilation, reposition and suture on 4/29) presents with epigastric pain, most likely due to chronic pancreatic duct obstruction. Surgery on board.

## 2022-07-11 NOTE — PROGRESS NOTE ADULT - SUBJECTIVE AND OBJECTIVE BOX
Merlin Mathew, MD   Hospitalist  Pager #[phone]    PROGRESS NOTE:     Patient is a 61y old  Male who presents with a chief complaint of abdominal pain (10 Jul 2022 14:15)      SUBJECTIVE / OVERNIGHT EVENTS: No overnight events   Patient continues to endorse abdominal pain- has been using Oxy IR 15 Q4 hours in the last 24 hours . Does not report improvement in pain   Is interested in surgery   Having BMs   Tolerating PO but is frustrated- does not want a DASH diet, wants a regular diet     ADDITIONAL REVIEW OF SYSTEMS:    MEDICATIONS  (STANDING):  ascorbic acid 500 milliGRAM(s) Oral daily  ferrous    sulfate 325 milliGRAM(s) Oral daily  heparin   Injectable 5000 Unit(s) SubCutaneous every 12 hours  oxyCODONE  ER Tablet 15 milliGRAM(s) Oral every 12 hours  pantoprazole    Tablet 40 milliGRAM(s) Oral before breakfast  sodium bicarbonate 650 milliGRAM(s) Oral two times a day    MEDICATIONS  (PRN):  acetaminophen     Tablet .. 650 milliGRAM(s) Oral every 6 hours PRN Temp greater or equal to 38C (100.4F), Mild Pain (1 - 3)  aluminum hydroxide/magnesium hydroxide/simethicone Suspension 30 milliLiter(s) Oral every 4 hours PRN Dyspepsia  melatonin 3 milliGRAM(s) Oral at bedtime PRN Insomnia  ondansetron Injectable 4 milliGRAM(s) IV Push every 8 hours PRN Nausea and/or Vomiting  oxyCODONE    IR 10 milliGRAM(s) Oral every 4 hours PRN Moderate Pain (4 - 6)  oxyCODONE    IR 15 milliGRAM(s) Oral every 4 hours PRN Severe Pain (7 - 10)      CAPILLARY BLOOD GLUCOSE        I&O's Summary      PHYSICAL EXAM:  Vital Signs Last 24 Hrs  T(C): 36.4 (11 Jul 2022 05:03), Max: 36.6 (10 Jul 2022 20:42)  T(F): 97.5 (11 Jul 2022 05:03), Max: 97.9 (10 Jul 2022 20:42)  HR: 79 (11 Jul 2022 05:03) (72 - 79)  BP: 147/78 (11 Jul 2022 05:03) (134/76 - 151/89)  BP(mean): --  RR: 17 (11 Jul 2022 05:03) (17 - 18)  SpO2: 98% (11 Jul 2022 05:03) (98% - 100%)    Parameters below as of 11 Jul 2022 05:03  Patient On (Oxygen Delivery Method): room air        CONSTITUTIONAL: NAD, well-developed thin male   RESPIRATORY: Normal respiratory effort; lungs are clear to auscultation bilaterally  CARDIOVASCULAR: Regular rate and rhythm, normal S1 and S2, no murmur/rub/gallop; No lower extremity edema; Peripheral pulses are 2+ bilaterally  ABDOMEN: + Epigastric TTP, normoactive bowel sounds, no rebound/guarding; No hepatosplenomegaly  MUSCULOSKELETAL no clubbing or cyanosis of digits; no joint swelling or tenderness to palpation  PSYCH: A+O to person, place, and time; affect appropriate    LABS:                        9.0    7.99  )-----------( 255      ( 11 Jul 2022 07:45 )             28.5     07-11    140  |  111<H>  |  37<H>  ----------------------------<  155<H>  4.1   |  18<L>  |  2.20<H>    Ca    8.3<L>      11 Jul 2022 07:45    TPro  5.7<L>  /  Alb  3.0<L>  /  TBili  <0.2  /  DBili  x   /  AST  11  /  ALT  8   /  AlkPhos  74  07-11                RADIOLOGY & ADDITIONAL TESTS:  Results Reviewed:   Imaging Personally Reviewed:  Electrocardiogram Personally Reviewed:    COORDINATION OF CARE:  Care Discussed with Consultants/Other Providers [Y/N]:  Prior or Outpatient Records Reviewed [Y/N]:

## 2022-07-12 LAB
ANION GAP SERPL CALC-SCNC: 8 MMOL/L — SIGNIFICANT CHANGE UP (ref 7–14)
BASOPHILS # BLD AUTO: 0.05 K/UL — SIGNIFICANT CHANGE UP (ref 0–0.2)
BASOPHILS NFR BLD AUTO: 0.7 % — SIGNIFICANT CHANGE UP (ref 0–2)
BUN SERPL-MCNC: 34 MG/DL — HIGH (ref 7–23)
CALCIUM SERPL-MCNC: 8.3 MG/DL — LOW (ref 8.4–10.5)
CHLORIDE SERPL-SCNC: 108 MMOL/L — HIGH (ref 98–107)
CO2 SERPL-SCNC: 19 MMOL/L — LOW (ref 22–31)
CREAT SERPL-MCNC: 2.2 MG/DL — HIGH (ref 0.5–1.3)
EGFR: 33 ML/MIN/1.73M2 — LOW
EOSINOPHIL # BLD AUTO: 0.2 K/UL — SIGNIFICANT CHANGE UP (ref 0–0.5)
EOSINOPHIL NFR BLD AUTO: 2.7 % — SIGNIFICANT CHANGE UP (ref 0–6)
GLUCOSE SERPL-MCNC: 88 MG/DL — SIGNIFICANT CHANGE UP (ref 70–99)
HCT VFR BLD CALC: 27.2 % — LOW (ref 39–50)
HGB BLD-MCNC: 8.6 G/DL — LOW (ref 13–17)
IANC: 4.9 K/UL — SIGNIFICANT CHANGE UP (ref 1.8–7.4)
IMM GRANULOCYTES NFR BLD AUTO: 0.4 % — SIGNIFICANT CHANGE UP (ref 0–1.5)
LYMPHOCYTES # BLD AUTO: 1.81 K/UL — SIGNIFICANT CHANGE UP (ref 1–3.3)
LYMPHOCYTES # BLD AUTO: 24.6 % — SIGNIFICANT CHANGE UP (ref 13–44)
MAGNESIUM SERPL-MCNC: 1.6 MG/DL — SIGNIFICANT CHANGE UP (ref 1.6–2.6)
MCHC RBC-ENTMCNC: 31.6 GM/DL — LOW (ref 32–36)
MCHC RBC-ENTMCNC: 31.7 PG — SIGNIFICANT CHANGE UP (ref 27–34)
MCV RBC AUTO: 100.4 FL — HIGH (ref 80–100)
MONOCYTES # BLD AUTO: 0.37 K/UL — SIGNIFICANT CHANGE UP (ref 0–0.9)
MONOCYTES NFR BLD AUTO: 5 % — SIGNIFICANT CHANGE UP (ref 2–14)
NEUTROPHILS # BLD AUTO: 4.9 K/UL — SIGNIFICANT CHANGE UP (ref 1.8–7.4)
NEUTROPHILS NFR BLD AUTO: 66.6 % — SIGNIFICANT CHANGE UP (ref 43–77)
NRBC # BLD: 0 /100 WBCS — SIGNIFICANT CHANGE UP
NRBC # FLD: 0 K/UL — SIGNIFICANT CHANGE UP
PHOSPHATE SERPL-MCNC: 2.7 MG/DL — SIGNIFICANT CHANGE UP (ref 2.5–4.5)
PLATELET # BLD AUTO: 231 K/UL — SIGNIFICANT CHANGE UP (ref 150–400)
POTASSIUM SERPL-MCNC: 5.1 MMOL/L — SIGNIFICANT CHANGE UP (ref 3.5–5.3)
POTASSIUM SERPL-SCNC: 5.1 MMOL/L — SIGNIFICANT CHANGE UP (ref 3.5–5.3)
RBC # BLD: 2.71 M/UL — LOW (ref 4.2–5.8)
RBC # FLD: 15.3 % — HIGH (ref 10.3–14.5)
SODIUM SERPL-SCNC: 135 MMOL/L — SIGNIFICANT CHANGE UP (ref 135–145)
WBC # BLD: 7.36 K/UL — SIGNIFICANT CHANGE UP (ref 3.8–10.5)
WBC # FLD AUTO: 7.36 K/UL — SIGNIFICANT CHANGE UP (ref 3.8–10.5)

## 2022-07-12 PROCEDURE — 99233 SBSQ HOSP IP/OBS HIGH 50: CPT

## 2022-07-12 RX ORDER — LIDOCAINE 4 G/100G
1 CREAM TOPICAL EVERY 24 HOURS
Refills: 0 | Status: COMPLETED | OUTPATIENT
Start: 2022-07-12 | End: 2022-07-16

## 2022-07-12 RX ORDER — CYCLOBENZAPRINE HYDROCHLORIDE 10 MG/1
5 TABLET, FILM COATED ORAL THREE TIMES A DAY
Refills: 0 | Status: DISCONTINUED | OUTPATIENT
Start: 2022-07-12 | End: 2022-07-13

## 2022-07-12 RX ADMIN — OXYCODONE HYDROCHLORIDE 15 MILLIGRAM(S): 5 TABLET ORAL at 08:48

## 2022-07-12 RX ADMIN — OXYCODONE HYDROCHLORIDE 15 MILLIGRAM(S): 5 TABLET ORAL at 09:30

## 2022-07-12 RX ADMIN — OXYCODONE HYDROCHLORIDE 15 MILLIGRAM(S): 5 TABLET ORAL at 21:14

## 2022-07-12 RX ADMIN — Medication 650 MILLIGRAM(S): at 04:32

## 2022-07-12 RX ADMIN — OXYCODONE HYDROCHLORIDE 15 MILLIGRAM(S): 5 TABLET ORAL at 18:00

## 2022-07-12 RX ADMIN — OXYCODONE HYDROCHLORIDE 15 MILLIGRAM(S): 5 TABLET ORAL at 05:05

## 2022-07-12 RX ADMIN — OXYCODONE HYDROCHLORIDE 15 MILLIGRAM(S): 5 TABLET ORAL at 12:51

## 2022-07-12 RX ADMIN — Medication 650 MILLIGRAM(S): at 17:17

## 2022-07-12 RX ADMIN — Medication 325 MILLIGRAM(S): at 12:39

## 2022-07-12 RX ADMIN — OXYCODONE HYDROCHLORIDE 15 MILLIGRAM(S): 5 TABLET ORAL at 23:02

## 2022-07-12 RX ADMIN — OXYCODONE HYDROCHLORIDE 15 MILLIGRAM(S): 5 TABLET ORAL at 22:32

## 2022-07-12 RX ADMIN — OXYCODONE HYDROCHLORIDE 15 MILLIGRAM(S): 5 TABLET ORAL at 04:35

## 2022-07-12 RX ADMIN — OXYCODONE HYDROCHLORIDE 15 MILLIGRAM(S): 5 TABLET ORAL at 11:26

## 2022-07-12 RX ADMIN — PANTOPRAZOLE SODIUM 40 MILLIGRAM(S): 20 TABLET, DELAYED RELEASE ORAL at 04:33

## 2022-07-12 RX ADMIN — Medication 500 MILLIGRAM(S): at 12:39

## 2022-07-12 RX ADMIN — OXYCODONE HYDROCHLORIDE 15 MILLIGRAM(S): 5 TABLET ORAL at 01:05

## 2022-07-12 RX ADMIN — OXYCODONE HYDROCHLORIDE 15 MILLIGRAM(S): 5 TABLET ORAL at 10:58

## 2022-07-12 RX ADMIN — OXYCODONE HYDROCHLORIDE 15 MILLIGRAM(S): 5 TABLET ORAL at 13:30

## 2022-07-12 RX ADMIN — HEPARIN SODIUM 5000 UNIT(S): 5000 INJECTION INTRAVENOUS; SUBCUTANEOUS at 17:18

## 2022-07-12 RX ADMIN — OXYCODONE HYDROCHLORIDE 15 MILLIGRAM(S): 5 TABLET ORAL at 17:16

## 2022-07-12 RX ADMIN — OXYCODONE HYDROCHLORIDE 15 MILLIGRAM(S): 5 TABLET ORAL at 00:34

## 2022-07-12 RX ADMIN — HEPARIN SODIUM 5000 UNIT(S): 5000 INJECTION INTRAVENOUS; SUBCUTANEOUS at 04:33

## 2022-07-12 NOTE — PROGRESS NOTE ADULT - SUBJECTIVE AND OBJECTIVE BOX
Merlin Mathew, MD   Hospitalist  Pager #[phone]    PROGRESS NOTE:     Patient is a 61y old  Male who presents with a chief complaint of abdominal pain (11 Jul 2022 13:47)      SUBJECTIVE / OVERNIGHT EVENTS: NEON   Patient continues to endorse abdominal pain and hiccups   Is agreeable to surgery if it is offered   Has had abdominal surgery in the past, no complications with any prior surgeries, no hx of thrombosis/excessive bleeding/anesthesia complications personally or in family   No hx of MI or stroke   Cannot ambulate >1 block or 1 flight of stairs 2/2 back pain and leg pain   Is able to do housework without any CP or SOB     ADDITIONAL REVIEW OF SYSTEMS:    MEDICATIONS  (STANDING):  ascorbic acid 500 milliGRAM(s) Oral daily  ferrous    sulfate 325 milliGRAM(s) Oral daily  heparin   Injectable 5000 Unit(s) SubCutaneous every 12 hours  oxyCODONE  ER Tablet 15 milliGRAM(s) Oral every 12 hours  pantoprazole    Tablet 40 milliGRAM(s) Oral before breakfast  sodium bicarbonate 650 milliGRAM(s) Oral two times a day    MEDICATIONS  (PRN):  acetaminophen     Tablet .. 650 milliGRAM(s) Oral every 6 hours PRN Temp greater or equal to 38C (100.4F), Mild Pain (1 - 3)  aluminum hydroxide/magnesium hydroxide/simethicone Suspension 30 milliLiter(s) Oral every 4 hours PRN Dyspepsia  melatonin 3 milliGRAM(s) Oral at bedtime PRN Insomnia  ondansetron Injectable 4 milliGRAM(s) IV Push every 8 hours PRN Nausea and/or Vomiting  oxyCODONE    IR 10 milliGRAM(s) Oral every 4 hours PRN Moderate Pain (4 - 6)  oxyCODONE    IR 15 milliGRAM(s) Oral every 4 hours PRN Severe Pain (7 - 10)      CAPILLARY BLOOD GLUCOSE        I&O's Summary      PHYSICAL EXAM:  Vital Signs Last 24 Hrs  T(C): 36.8 (12 Jul 2022 04:38), Max: 36.8 (11 Jul 2022 15:17)  T(F): 98.2 (12 Jul 2022 04:38), Max: 98.3 (11 Jul 2022 20:30)  HR: 64 (12 Jul 2022 04:38) (64 - 76)  BP: 132/68 (12 Jul 2022 04:38) (132/68 - 154/78)  BP(mean): --  RR: 17 (12 Jul 2022 04:38) (16 - 17)  SpO2: 100% (12 Jul 2022 04:38) (98% - 100%)    Parameters below as of 12 Jul 2022 04:38  Patient On (Oxygen Delivery Method): room air        CONSTITUTIONAL: NAD, well-developed male- thin   RESPIRATORY: Normal respiratory effort; lungs are clear to auscultation bilaterally  CARDIOVASCULAR: Regular rate and rhythm, normal S1 and S2, no murmur/rub/gallop; No lower extremity edema; Peripheral pulses are 2+ bilaterally  ABDOMEN: + Epigastric TTP; Nontender to palpation, normoactive bowel sounds, no rebound/guarding; No hepatosplenomegaly  MUSCULOSKELETAL no clubbing or cyanosis of digits; no joint swelling or tenderness to palpation  PSYCH: A+O to person, place, and time; affect appropriate    LABS:                        8.6    7.36  )-----------( 231      ( 12 Jul 2022 05:59 )             27.2     07-12    135  |  108<H>  |  34<H>  ----------------------------<  88  5.1   |  19<L>  |  2.20<H>    Ca    8.3<L>      12 Jul 2022 05:59  Phos  2.7     07-12  Mg     1.60     07-12    TPro  5.7<L>  /  Alb  3.0<L>  /  TBili  <0.2  /  DBili  x   /  AST  11  /  ALT  8   /  AlkPhos  74  07-11                RADIOLOGY & ADDITIONAL TESTS:  Results Reviewed:   Imaging Personally Reviewed:  Electrocardiogram Personally Reviewed:    COORDINATION OF CARE:  Care Discussed with Consultants/Other Providers [Y/N]:  Prior or Outpatient Records Reviewed [Y/N]:

## 2022-07-12 NOTE — CONSULT NOTE ADULT - SUBJECTIVE AND OBJECTIVE BOX
Chief Complaint: Epigastric pain    HPI:  61M with PMHx of chronic pancreatitis (complicated by chronic pain of which secondary to alcohol abuse), CKD4, hep C s/p SVR and benign esophageal stricture (post-stent 4/19/22 c/b need for CRE balloon dilation, reposition and suture on 4/29) presents with epigastric pain. Patient is AAOx3 but intermittently fell asleep during the interview and was able to provide some information. The pain is sharp, 10/10 in severity, and constant. Nothing improves or worsens the pain. He was able to tolerate some food but has stopped eating since today morning. He has been passing gas and had a BM yesterday. He denies any episode of nausea or vomiting after coming to the ED. He has been feeling nauseous intermittently. Otherwise, denies any fever, chills, nausea, chest pain, SOB or LE edema.   In the ED, his vitals were stable. Labs were notable for chronic anemia, YUNIEL on CKD. CT A/P showed redemonstration of findings of chronic pancreatitis.  (06 Jul 2022 17:37)      PAST MEDICAL & SURGICAL HISTORY:  ETOH abuse  sober x1 year approximately      Pancreatitis      Hepatitis C  treated      Gout      HTN (hypertension)      Chronic kidney disease (CKD)      H/O chronic pancreatitis      Gout      Alcohol abuse      Gastric perforation          FAMILY HISTORY:  FH: HTN (hypertension)        SOCIAL HISTORY:  [ ] Denies Smoking, Alcohol, or Drug Use    Allergies    No Known Allergies    Intolerances        PAIN MEDICATIONS:  acetaminophen     Tablet .. 650 milliGRAM(s) Oral every 6 hours PRN  melatonin 3 milliGRAM(s) Oral at bedtime PRN  ondansetron Injectable 4 milliGRAM(s) IV Push every 8 hours PRN  oxyCODONE    IR 10 milliGRAM(s) Oral every 4 hours PRN  oxyCODONE    IR 15 milliGRAM(s) Oral every 4 hours PRN  oxyCODONE  ER Tablet 15 milliGRAM(s) Oral every 12 hours      Heme:  heparin   Injectable 5000 Unit(s) SubCutaneous every 12 hours    Antibiotics:    Cardiovascular:    GI:  aluminum hydroxide/magnesium hydroxide/simethicone Suspension 30 milliLiter(s) Oral every 4 hours PRN  pantoprazole    Tablet 40 milliGRAM(s) Oral before breakfast    Endocrine:    All Other Medications:  ascorbic acid 500 milliGRAM(s) Oral daily  ferrous    sulfate 325 milliGRAM(s) Oral daily  sodium bicarbonate 650 milliGRAM(s) Oral two times a day      Vital Signs Last 24 Hrs  T(C): 36.9 (12 Jul 2022 14:39), Max: 36.9 (12 Jul 2022 14:39)  T(F): 98.4 (12 Jul 2022 14:39), Max: 98.4 (12 Jul 2022 14:39)  HR: 69 (12 Jul 2022 14:39) (64 - 76)  BP: 150/77 (12 Jul 2022 14:39) (132/68 - 154/78)  BP(mean): --  RR: 18 (12 Jul 2022 14:39) (17 - 18)  SpO2: 100% (12 Jul 2022 14:39) (100% - 100%)    Parameters below as of 12 Jul 2022 14:39  Patient On (Oxygen Delivery Method): room air        PAIN SCORE:    7/10     SCALE USED: (1-10 VNRS)             PHYSICAL EXAM:    GENERAL: NAD, well-groomed, well-developed        LABS:                          8.6    7.36  )-----------( 231      ( 12 Jul 2022 05:59 )             27.2     07-12    135  |  108<H>  |  34<H>  ----------------------------<  88  5.1   |  19<L>  |  2.20<H>    Ca    8.3<L>      12 Jul 2022 05:59  Phos  2.7     07-12  Mg     1.60     07-12    TPro  5.7<L>  /  Alb  3.0<L>  /  TBili  <0.2  /  DBili  x   /  AST  11  /  ALT  8   /  AlkPhos  74  07-11    SUMMARY:    Patient seen at bedside. Patient states he is having in the mid epigastric area. Patient states he has been having this pain for almost 4 years. Patient states the pain feels like a "sudden " that starts from the epigastric area and then "explodes" to the rest of his body. Patient reports worsening of pain while he talks, reports having frequent hiccups. Patient takes Percocet 10-325mg tablets at home. Reports taking two 10 mg tablets Q4H even though his prescription is for 10mg Q4H. Patient states he run of Percocet faster since he is doubling the dose and when that happens, he comes to the ED for pain management. Patient’s pain managed outpatient by PCP Dr. Orantes. Patient reports contacting chronic pain management outpatient, Dr. Eloy Brennan but did not see him since he was offered interventional pain management and not opioids. Patient states the Oxycodone 15mg helps him here in the hospital for 2 hours. Patient has been taking Percocet for more than 2 years according to him. States he does not want to try opioid rotation and prefers to stay on Oxycodone 15mg. Patient states he discussed an increase in his Percocet dose with Dr. Orantes but the dose was not increased because the Oxycodone 15mg was not listed on his last discharge paper works as per patient. The addition of Oxycontin helped ease his pain a little as per patient. Patient reports taking Gabapentin in the past and refusing Gabapentin states" it adds more pain". Patient denies nausea and vomiting, tolerating regular diet.  Patient denies alcohol use now, states he stopped 4 years ago. Reports smoking 1 cigarette a week. Denies illicit drug use. Denies anxiety and depression. Patient alert and orientedx4. Patient answers questions appropriately. Maintains eye contact. Not in any apparent distress.  RECOMMENDATIONS:       Discussed patient with chronic pain management MD Dr. Leal and her recommendations are as follows:  1) Consider discontinuing current orders for Acetaminophen, instead order:  > PO Acetaminophen 650mg Q6H standing x2 days, then PRN for pain. Not to be given within 6 hours of last dose of Acetaminophen.  2) Consider PO Flexeril 5mg Q8H standing for muscle spasms. Hold for over sedation.  3) Consider continuing current orders for Oxycodone IR. Hold for over sedation. Not to be given within one hour of any immediate acting opioid. Recommend weaning Oxycodone IR dose to 10mg Q4H prior to discharge as tolerated by patient.  4) Recommend discontinuing Oxycontin 15mg ER, if outpatient provider is not ok with continuing the medication outpatient. Patient not on extended release at home. Called Dr. Orantes’s office to discuss possibility of continuing Oxycontin outpatient, was unable to speak to provider since he is out of office. Contact number given to primary team.  5) Recommend follow up with chronic pain management MD outpatient upon discharge.   6) Recommend Holistic RN consult.  7) Consider Lidocaine patch to mid epigastric area X 5 days. 12 hours ON/12 hours OFF.  [x ]  [place]  Reviewed and found:  Oxycodone-acetaminophen 10-325mg tablets, quantity of 168 pills for 28 days.    Pain service to sign off on patient. Please call pain service if further assistance is needed with pain management.

## 2022-07-12 NOTE — PROGRESS NOTE ADULT - PROBLEM SELECTOR PLAN 1
Patient with acute on chronic epigastric pain, found to have chronic pancreatitis with stones in the pancreatic duct on CT A/P   -Lipase and LFTs is WNL, low suspicion for acute pancreatitis or cholangitis.   -GI recs appreciated. C/w PPI daily  -Surgery consulted for evaluation for possible Puestow procedure - awaiting final date   -Pain seems controlled on exam. Though pt reports poor control. Based on observation, his tolerance of PO/good appetite, those do not correlate to his degree of pain reported.  - Cont Oxy ER 15 BID + Oxy 10/15 PRN pain   [ ] Pain medicine c/s   -Currently on a regular diet. Monitor for intolerance - continues to tolerate w/o issues.

## 2022-07-12 NOTE — PROGRESS NOTE ADULT - PROBLEM SELECTOR PLAN 3
YUNIEL on CKD4, most likely due to decreased PO intake, improved now back to baseline   - Cont bicarb tabs for non-anion gap acidosis

## 2022-07-13 ENCOUNTER — TRANSCRIPTION ENCOUNTER (OUTPATIENT)
Age: 62
End: 2022-07-13

## 2022-07-13 LAB
ANION GAP SERPL CALC-SCNC: 11 MMOL/L — SIGNIFICANT CHANGE UP (ref 7–14)
BASOPHILS # BLD AUTO: 0.05 K/UL — SIGNIFICANT CHANGE UP (ref 0–0.2)
BASOPHILS NFR BLD AUTO: 0.6 % — SIGNIFICANT CHANGE UP (ref 0–2)
BUN SERPL-MCNC: 37 MG/DL — HIGH (ref 7–23)
CALCIUM SERPL-MCNC: 8.5 MG/DL — SIGNIFICANT CHANGE UP (ref 8.4–10.5)
CHLORIDE SERPL-SCNC: 108 MMOL/L — HIGH (ref 98–107)
CO2 SERPL-SCNC: 19 MMOL/L — LOW (ref 22–31)
CREAT SERPL-MCNC: 2.39 MG/DL — HIGH (ref 0.5–1.3)
EGFR: 30 ML/MIN/1.73M2 — LOW
EOSINOPHIL # BLD AUTO: 0.23 K/UL — SIGNIFICANT CHANGE UP (ref 0–0.5)
EOSINOPHIL NFR BLD AUTO: 2.8 % — SIGNIFICANT CHANGE UP (ref 0–6)
GLUCOSE SERPL-MCNC: 153 MG/DL — HIGH (ref 70–99)
HCT VFR BLD CALC: 26.9 % — LOW (ref 39–50)
HGB BLD-MCNC: 8.5 G/DL — LOW (ref 13–17)
IANC: 6.08 K/UL — SIGNIFICANT CHANGE UP (ref 1.8–7.4)
IMM GRANULOCYTES NFR BLD AUTO: 0.5 % — SIGNIFICANT CHANGE UP (ref 0–1.5)
LYMPHOCYTES # BLD AUTO: 1.43 K/UL — SIGNIFICANT CHANGE UP (ref 1–3.3)
LYMPHOCYTES # BLD AUTO: 17.3 % — SIGNIFICANT CHANGE UP (ref 13–44)
MAGNESIUM SERPL-MCNC: 1.6 MG/DL — SIGNIFICANT CHANGE UP (ref 1.6–2.6)
MCHC RBC-ENTMCNC: 31.6 GM/DL — LOW (ref 32–36)
MCHC RBC-ENTMCNC: 32.1 PG — SIGNIFICANT CHANGE UP (ref 27–34)
MCV RBC AUTO: 101.5 FL — HIGH (ref 80–100)
MONOCYTES # BLD AUTO: 0.43 K/UL — SIGNIFICANT CHANGE UP (ref 0–0.9)
MONOCYTES NFR BLD AUTO: 5.2 % — SIGNIFICANT CHANGE UP (ref 2–14)
NEUTROPHILS # BLD AUTO: 6.08 K/UL — SIGNIFICANT CHANGE UP (ref 1.8–7.4)
NEUTROPHILS NFR BLD AUTO: 73.6 % — SIGNIFICANT CHANGE UP (ref 43–77)
NRBC # BLD: 0 /100 WBCS — SIGNIFICANT CHANGE UP
NRBC # FLD: 0 K/UL — SIGNIFICANT CHANGE UP
PHOSPHATE SERPL-MCNC: 2.9 MG/DL — SIGNIFICANT CHANGE UP (ref 2.5–4.5)
PLATELET # BLD AUTO: 236 K/UL — SIGNIFICANT CHANGE UP (ref 150–400)
POTASSIUM SERPL-MCNC: 4.7 MMOL/L — SIGNIFICANT CHANGE UP (ref 3.5–5.3)
POTASSIUM SERPL-SCNC: 4.7 MMOL/L — SIGNIFICANT CHANGE UP (ref 3.5–5.3)
RBC # BLD: 2.65 M/UL — LOW (ref 4.2–5.8)
RBC # FLD: 15.4 % — HIGH (ref 10.3–14.5)
SARS-COV-2 RNA SPEC QL NAA+PROBE: SIGNIFICANT CHANGE UP
SODIUM SERPL-SCNC: 138 MMOL/L — SIGNIFICANT CHANGE UP (ref 135–145)
WBC # BLD: 8.26 K/UL — SIGNIFICANT CHANGE UP (ref 3.8–10.5)
WBC # FLD AUTO: 8.26 K/UL — SIGNIFICANT CHANGE UP (ref 3.8–10.5)

## 2022-07-13 PROCEDURE — 99232 SBSQ HOSP IP/OBS MODERATE 35: CPT

## 2022-07-13 RX ORDER — ACETAMINOPHEN 500 MG
650 TABLET ORAL EVERY 6 HOURS
Refills: 0 | Status: COMPLETED | OUTPATIENT
Start: 2022-07-13 | End: 2022-07-15

## 2022-07-13 RX ORDER — OXYCODONE HYDROCHLORIDE 5 MG/1
1 TABLET ORAL
Qty: 42 | Refills: 0
Start: 2022-07-13 | End: 2022-07-19

## 2022-07-13 RX ORDER — POLYETHYLENE GLYCOL 3350 17 G/17G
17 POWDER, FOR SOLUTION ORAL DAILY
Refills: 0 | Status: DISCONTINUED | OUTPATIENT
Start: 2022-07-13 | End: 2022-07-20

## 2022-07-13 RX ORDER — CYCLOBENZAPRINE HYDROCHLORIDE 10 MG/1
5 TABLET, FILM COATED ORAL THREE TIMES A DAY
Refills: 0 | Status: DISCONTINUED | OUTPATIENT
Start: 2022-07-13 | End: 2022-07-20

## 2022-07-13 RX ORDER — OXYCODONE HYDROCHLORIDE 5 MG/1
1 TABLET ORAL
Qty: 14 | Refills: 0
Start: 2022-07-13 | End: 2022-07-19

## 2022-07-13 RX ORDER — SENNA PLUS 8.6 MG/1
1 TABLET ORAL AT BEDTIME
Refills: 0 | Status: DISCONTINUED | OUTPATIENT
Start: 2022-07-13 | End: 2022-07-20

## 2022-07-13 RX ADMIN — HEPARIN SODIUM 5000 UNIT(S): 5000 INJECTION INTRAVENOUS; SUBCUTANEOUS at 17:06

## 2022-07-13 RX ADMIN — OXYCODONE HYDROCHLORIDE 15 MILLIGRAM(S): 5 TABLET ORAL at 22:00

## 2022-07-13 RX ADMIN — OXYCODONE HYDROCHLORIDE 15 MILLIGRAM(S): 5 TABLET ORAL at 08:26

## 2022-07-13 RX ADMIN — OXYCODONE HYDROCHLORIDE 15 MILLIGRAM(S): 5 TABLET ORAL at 21:04

## 2022-07-13 RX ADMIN — Medication 325 MILLIGRAM(S): at 12:25

## 2022-07-13 RX ADMIN — PANTOPRAZOLE SODIUM 40 MILLIGRAM(S): 20 TABLET, DELAYED RELEASE ORAL at 05:39

## 2022-07-13 RX ADMIN — OXYCODONE HYDROCHLORIDE 15 MILLIGRAM(S): 5 TABLET ORAL at 02:57

## 2022-07-13 RX ADMIN — OXYCODONE HYDROCHLORIDE 15 MILLIGRAM(S): 5 TABLET ORAL at 12:24

## 2022-07-13 RX ADMIN — OXYCODONE HYDROCHLORIDE 15 MILLIGRAM(S): 5 TABLET ORAL at 11:00

## 2022-07-13 RX ADMIN — OXYCODONE HYDROCHLORIDE 15 MILLIGRAM(S): 5 TABLET ORAL at 09:10

## 2022-07-13 RX ADMIN — OXYCODONE HYDROCHLORIDE 15 MILLIGRAM(S): 5 TABLET ORAL at 17:45

## 2022-07-13 RX ADMIN — OXYCODONE HYDROCHLORIDE 15 MILLIGRAM(S): 5 TABLET ORAL at 17:02

## 2022-07-13 RX ADMIN — OXYCODONE HYDROCHLORIDE 15 MILLIGRAM(S): 5 TABLET ORAL at 03:27

## 2022-07-13 RX ADMIN — Medication 650 MILLIGRAM(S): at 05:39

## 2022-07-13 RX ADMIN — CYCLOBENZAPRINE HYDROCHLORIDE 5 MILLIGRAM(S): 10 TABLET, FILM COATED ORAL at 21:04

## 2022-07-13 RX ADMIN — CYCLOBENZAPRINE HYDROCHLORIDE 5 MILLIGRAM(S): 10 TABLET, FILM COATED ORAL at 14:14

## 2022-07-13 RX ADMIN — OXYCODONE HYDROCHLORIDE 15 MILLIGRAM(S): 5 TABLET ORAL at 10:26

## 2022-07-13 RX ADMIN — OXYCODONE HYDROCHLORIDE 15 MILLIGRAM(S): 5 TABLET ORAL at 13:00

## 2022-07-13 RX ADMIN — Medication 500 MILLIGRAM(S): at 12:26

## 2022-07-13 RX ADMIN — Medication 650 MILLIGRAM(S): at 12:28

## 2022-07-13 RX ADMIN — HEPARIN SODIUM 5000 UNIT(S): 5000 INJECTION INTRAVENOUS; SUBCUTANEOUS at 05:39

## 2022-07-13 RX ADMIN — Medication 650 MILLIGRAM(S): at 13:00

## 2022-07-13 RX ADMIN — Medication 650 MILLIGRAM(S): at 17:04

## 2022-07-13 NOTE — DIETITIAN INITIAL EVALUATION ADULT - PERTINENT MEDS FT
MEDICATIONS  (STANDING):  acetaminophen     Tablet .. 650 milliGRAM(s) Oral every 6 hours  ascorbic acid 500 milliGRAM(s) Oral daily  cyclobenzaprine 5 milliGRAM(s) Oral three times a day  ferrous    sulfate 325 milliGRAM(s) Oral daily  heparin   Injectable 5000 Unit(s) SubCutaneous every 12 hours  lidocaine   4% Patch 1 Patch Transdermal every 24 hours  oxyCODONE  ER Tablet 15 milliGRAM(s) Oral every 12 hours  pantoprazole    Tablet 40 milliGRAM(s) Oral before breakfast  sodium bicarbonate 650 milliGRAM(s) Oral two times a day    MEDICATIONS  (PRN):  aluminum hydroxide/magnesium hydroxide/simethicone Suspension 30 milliLiter(s) Oral every 4 hours PRN Dyspepsia  melatonin 3 milliGRAM(s) Oral at bedtime PRN Insomnia  ondansetron Injectable 4 milliGRAM(s) IV Push every 8 hours PRN Nausea and/or Vomiting  oxyCODONE    IR 15 milliGRAM(s) Oral every 4 hours PRN Severe Pain (7 - 10)  oxyCODONE    IR 10 milliGRAM(s) Oral every 4 hours PRN Moderate Pain (4 - 6)

## 2022-07-13 NOTE — CHART NOTE - NSCHARTNOTEFT_GEN_A_CORE
Patient was transferred to surgical service under Dr. Pathak for Puestow procedure on Wednesday 7/20.   ISTOP Reference #: [id number]  Last dispensed Oxycodone Acetaminophen  on 6/22 for a 28 day supply (168 pills total) Patient was transferred to surgical service under Dr. Pathak for Puestow procedure on Wednesday 7/20.     Plan  - Transfer to [address] [place] [address]  - Diet: regular with ensures TID  - TTE  - Cardiology consult for optimization  - Obtain medical records from [facility], [phone]    ISTOP Reference #: [id number]  Last dispensed Oxycodone Acetaminophen  on 6/22 for a 28 day supply (168 pills total)

## 2022-07-13 NOTE — PROGRESS NOTE ADULT - PROBLEM SELECTOR PLAN 5
DVT- enoxaparin 40 mg daily    Dispo- Pending surgical intervention- awaiting plan from Surgery
DVT-enoxaparin 40 mg daily    Dispo- Pending surgical intervention next week.
Most likely due to AOCD. Likely has component of THO as wlel given low ferritin.
DVT- enoxaparin 40 mg daily    Dispo- Awaiting discussion from surgery re d/c home with plan to return Wed for OR v remaining hospitalized until OR
DVT- enoxaparin 40 mg daily    Dispo- Pending surgical intervention next week.
DVT-enoxaparin 40 mg daily    Dispo- Pending surgical intervention next week.
DVT- enoxaparin 40 mg daily    Dispo- Pending surgical intervention next week.

## 2022-07-13 NOTE — DIETITIAN INITIAL EVALUATION ADULT - ADD RECOMMEND
1. Encourage PO intake and honor food preferences as able.   2. Updated standing weight   3. Multivitamin daily.

## 2022-07-13 NOTE — PROGRESS NOTE ADULT - NSPROGADDITIONALINFOA_GEN_ALL_CORE
Medical risk stratification:   Patient does not have any hx of MI, stroke, arrythmia or severe valvular disease. Patient does have a hx of CKD, Cr currently at baseline   - Patient unable to complete >4 METS of activity 2/2 chronic pain   - RCRI score of 2 which equates to a 10.1% 30 day risk of death, MI or cardiac arrest   - Patient is at intermediate risk for an intermediate risk procedure, will proceed without further testing   - Discussed risks and benefits of surgery and complications with patient- patient willing to proceed with surgery
Medical risk stratification:   Patient does not have any hx of MI, stroke, arrythmia or severe valvular disease. Patient does have a hx of CKD, Cr currently at baseline   - Patient unable to complete >4 METS of activity 2/2 chronic pain   - RCRI score of 2 which equates to a 10.1% 30 day risk of death, MI or cardiac arrest   - Patient is at intermediate risk for an intermediate risk procedure, will proceed without further testing   - Discussed risks and benefits of surgery and complications with patient- patient willing to proceed with surgery

## 2022-07-13 NOTE — DIETITIAN INITIAL EVALUATION ADULT - ORAL INTAKE PTA/DIET HISTORY
Pt was reluctant to provide information on diet hx. States he eats only 1 meal daily and that he lives with his 87 y/o mother. He did not want to say why he does not eat more. Food insecure?

## 2022-07-13 NOTE — DISCHARGE NOTE PROVIDER - NSFOLLOWUPCLINICS_GEN_ALL_ED_FT
[facility]  Nephrology  [address] [place] [address], [address] [place] [address]  Phone: [phone]  Fax:   Follow Up Time: 1 week

## 2022-07-13 NOTE — DIETITIAN INITIAL EVALUATION ADULT - OTHER INFO
A&Ox4. As per RN, pt is eating nonstop. Pt was seen eating rice and chicken ravenously. He has been on regular diet since 7/7. Denies any GI issues (nausea/vomiting/diarrhea/constipation.) Denies any chewing or swallowing difficulties at this time. NKFA.

## 2022-07-13 NOTE — DISCHARGE NOTE PROVIDER - NSDCCPCAREPLAN_GEN_ALL_CORE_FT
PRINCIPAL DISCHARGE DIAGNOSIS  Diagnosis: Chronic pancreatitis  Assessment and Plan of Treatment: You were admitted to the hospital with abdominal pain. You had a CT scan which showed chronic pancreatitis, which is inflammation of the pancreas. The CT scan also showed gallstones in the pancreas. You were given pain medicine and you improved. You were seen by general surgery. You will be scheduled for surgery as an outpatient next week?? Please continue to take your pain medicine as prescribed? Please follow-up with your primary care doctor Dr. Orantes for your scheduled appointment on July 20th at 12:20pm.      SECONDARY DISCHARGE DIAGNOSES  Diagnosis: Esophageal stricture  Assessment and Plan of Treatment: You were seen by the gastroenterologist while in the hospital. It was recommended that your stent stay in place. Continue with PPI daily?? Please follow-up with your primary care provider within 1-2 weeks of discharge.    Diagnosis: Acute kidney injury superimposed on CKD  Assessment and Plan of Treatment: While in the hospital, your kidney function was noted to be decreased but then returned to baseline. Please follow-up with your primary care provider within 1-2 weeks of discharge for a repeat creatinine check. Follow-up with your nephrologist Dr. Fraga for your scheduled appointment on July 20th at 9:30am.     PRINCIPAL DISCHARGE DIAGNOSIS  Diagnosis: Chronic pancreatitis  Assessment and Plan of Treatment:       SECONDARY DISCHARGE DIAGNOSES  Diagnosis: Esophageal stricture  Assessment and Plan of Treatment: You were seen by the gastroenterologist while in the hospital. It was recommended that your stent stay in place. Continue with PPI daily. Please follow-up with your primary care provider within 1-2 weeks of discharge and Dr Edwards in 1-2 months.    Diagnosis: Acute kidney injury superimposed on CKD  Assessment and Plan of Treatment: While in the hospital, your kidney function was noted to be decreased but then returned to baseline. Please follow-up with your primary care provider within 1-2 weeks of discharge for a repeat creatinine check. Follow-up with your nephrologist Dr. Fraga for your scheduled appointment on July 20th at 9:30am.    Diagnosis: Hyperkalemia  Assessment and Plan of Treatment:      PRINCIPAL DISCHARGE DIAGNOSIS  Diagnosis: Chronic pancreatitis  Assessment and Plan of Treatment: WOUND CARE:  Please keep incisions clean and dry. Please do not Scrub or rub incisions. Do not use lotion or powder on incisions.   BATHING: You may shower and/or sponge bathe. You may use warm soapy water in the shower and rinse, pat dry.  ACTIVITY: No heavy lifting or straining. Otherwise, you may return to your usual level of physical activity. If you are taking narcotic pain medication DO NOT drive a car, operate machinery or make important decisions.  DIET: LOW FAT DIET  NOTIFY YOUR SURGEON IF YOU HAVE: any bleeding that does not stop, any pus draining from your wound(s), any fever (over 100.4 F) persistent nausea/vomiting, or if your pain is not controlled on your discharge pain medications, unable to urinate.  Please follow up with your primary care physician in one week regarding your hospitalization, bring copies of your discharge paperwork.  Please follow up with your surgeon, Dr. Pathak within 1-2 weeks of discharge.      SECONDARY DISCHARGE DIAGNOSES  Diagnosis: Esophageal stricture  Assessment and Plan of Treatment: You were seen by the gastroenterologist while in the hospital. It was recommended that your stent stay in place. Continue with PPI daily. Please follow-up with your primary care provider within 1-2 weeks of discharge and Dr Edwards in 1-2 months.    Diagnosis: Acute kidney injury superimposed on CKD  Assessment and Plan of Treatment: While in the hospital, your kidney function was noted to be decreased but then returned to baseline. Please follow-up with your primary care provider within 1-2 weeks of discharge for a repeat creatinine check. Follow-up with your nephrologist Dr. Fraga for your scheduled appointment on July 20th at 9:30am.    Diagnosis: Hyperkalemia  Assessment and Plan of Treatment:      PRINCIPAL DISCHARGE DIAGNOSIS  Diagnosis: Chronic pancreatitis  Assessment and Plan of Treatment: WOUND CARE:  Please keep incisions clean and dry. Please do not Scrub or rub incisions. Do not use lotion or powder on incisions.   BATHING: You may shower and/or sponge bathe. You may use warm soapy water in the shower and rinse, pat dry.  ACTIVITY: No heavy lifting or straining. Otherwise, you may return to your usual level of physical activity. If you are taking narcotic pain medication DO NOT drive a car, operate machinery or make important decisions.  DIET: LOW FAT DIET  NOTIFY YOUR SURGEON IF YOU HAVE: any bleeding that does not stop, any pus draining from your wound(s), any fever (over 100.4 F) persistent nausea/vomiting, or if your pain is not controlled on your discharge pain medications, unable to urinate.  Please follow up with your primary care physician in one week regarding your hospitalization, bring copies of your discharge paperwork.  Please follow up with your surgeon, Dr. Pathak within 1-2 weeks of discharge.      SECONDARY DISCHARGE DIAGNOSES  Diagnosis: Esophageal stricture  Assessment and Plan of Treatment: You were seen by the gastroenterologist while in the hospital. It was recommended that your stent stay in place. Continue with PPI daily. Please follow-up with your primary care provider within 1-2 weeks of discharge and Dr Edwards in 1-2 months.    Diagnosis: Acute kidney injury superimposed on CKD  Assessment and Plan of Treatment: While in the hospital, your kidney function was noted to be decreased but then returned to baseline. Please follow-up with your primary care provider within 1-2 weeks of discharge for a repeat creatinine check. Follow-up with your nephrologist Dr. Fraga  within 2 week of discharge.    Diagnosis: Hyperkalemia  Assessment and Plan of Treatment: Please  continue LASIX 40mg daily,   Please follow up with Dr. Frgaa within  2 weeks of discharge. If you cannot get an appointment you mayfollow [facility] with [facility]. The number has been provided for you.

## 2022-07-13 NOTE — DISCHARGE NOTE PROVIDER - NSRESEARCHGRANT_PROPHYLAXISRECOMFT_GEN_A_CORE
A catheter (Catheter 145d Pgtl Crv 6fr .051in .042in 110cm Sup)  was used to cross the aortic valve and perform left ventriculography by power injection.  IMPROVE-DD Application Not Available

## 2022-07-13 NOTE — PROGRESS NOTE ADULT - PROBLEM SELECTOR PROBLEM 3
Acute kidney injury superimposed on CKD
Acute kidney injury superimposed on CKD
Gastric outlet obstruction
Acute kidney injury superimposed on CKD

## 2022-07-13 NOTE — DISCHARGE NOTE PROVIDER - HOSPITAL COURSE
61M with PMHx of chronic pancreatitis (complicated by chronic pain of which secondary to alcohol abuse), CKD4, hep C s/p SVR and benign esophageal stricture (post-stent 4/19/22 c/b need for CRE balloon dilation, reposition and suture on 4/29) presents with epigastric pain, most likely due to chronic pancreatic duct obstruction.     Chronic pancreatitis.   -Patient with acute on chronic epigastric pain, found to have chronic pancreatitis with stones in the pancreatic duct on CT A/P   -Lipase and LFTs is WNL, low suspicion for acute pancreatitis or cholangitis.   -GI recs appreciated. C/w PPI daily  -Surgery consulted for evaluation for possible Puestow procedure - will f/u as outpatient. Planned for next Wednesday????  -Pain seems controlled on exam. Though pt reports poor control. Based on observation, his tolerance of PO/good appetite, those do not correlate to his degree of pain reported.  - Cont Oxy ER 15 BID + Oxy 10/15 PRN pain   -Tolerates regular diet without issue     Esophageal stricture.   -Patient with esophageal stricture s/p stent   -GI recs appreciated. Would not remove stent at this time   -C/w PPI daily   -Further follow up as OP.    Acute kidney injury superimposed on CKD.   -YUNIEL on CKD4, most likely due to decreased PO intake, improved now back to baseline   - Cont bicarb tabs for non-anion gap acidosis.    Anemia.   Most likely due to AOCD. Likely has component of THO as well given low ferritin.     Patient is medically cleared for discharge on ___________. Case discussed with ______________ 61M with PMHx of chronic pancreatitis (complicated by chronic pain of which secondary to alcohol abuse), CKD4, hep C s/p SVR and benign esophageal stricture (post-stent 4/19/22 c/b need for CRE balloon dilation, reposition and suture on 4/29) presents with epigastric pain. Patient is AAOx3 but intermittently fell asleep during the interview and was able to provide some information. The pain is sharp, 10/10 in severity, and constant. Nothing improves or worsens the pain. He was able to tolerate some food but has stopped eating since today morning. He has been passing gas and had a BM yesterday. He denies any episode of nausea or vomiting after coming to the ED. He has been feeling nauseous intermittently. Otherwise, denies any fever, chills, nausea, chest pain, SOB or LE edema.   In the ED, his vitals were stable. Labs were notable for chronic anemia, YUNIEL on CKD. CT A/P showed redemonstration of findings of chronic pancreatitis.     7/6 GI was consulted for chronic pancreatitis. Recommended daily PPI, keeping esophageal stent in place, and consult surgery for potential Puestow procedure  7/7 Medicine managing YUNIEL superimposed on CKD. Serum creatinine trend daily with   7/10 Right forearm IV infiltrated. IV was placed at alternative location  7/12 Pain service was consulted for management of pain 2/2 chronic pancreatitis  7/13 Patient was transferred from medicine to surgical oncology service under the care of Dr. Pathak for puestow procedure  7/14 Cardiology was consulted for optimization. Patient is deemed intermediate risk for a low-moderate risk procedure  7/17 Patient was found to be hyperkalemic on AM labs. EKG demonstrated mild peaked T waves.   7/18 Nephrology was consulted for hyperkalemia and CKD. Recommended lokelma 10 mg TID, lasix, sodium bicarb tabs, and low K+ diet.   61M with PMHx of chronic pancreatitis (complicated by chronic pain of which secondary to alcohol abuse), CKD4, hep C s/p SVR and benign esophageal stricture (post-stent 4/19/22 c/b need for CRE balloon dilation, reposition and suture on 4/29) presents with epigastric pain. Patient is AAOx3 but intermittently fell asleep during the interview and was able to provide some information. The pain is sharp, 10/10 in severity, and constant. Nothing improves or worsens the pain. He was able to tolerate some food but has stopped eating since today morning. He has been passing gas and had a BM yesterday. He denies any episode of nausea or vomiting after coming to the ED. He has been feeling nauseous intermittently. Otherwise, denies any fever, chills, nausea, chest pain, SOB or LE edema.   In the ED, his vitals were stable. Labs were notable for chronic anemia, YUNIEL on CKD. CT A/P showed redemonstration of findings of chronic pancreatitis.     7/6 GI was consulted for chronic pancreatitis. Recommended daily PPI, keeping esophageal stent in place, and consult surgery for potential Puestow procedure  7/7 Medicine managing YUNIEL superimposed on CKD. Serum creatinine trend daily with   7/10 Right forearm IV infiltrated. IV was placed at alternative location  7/12 Pain service was consulted for management of pain 2/2 chronic pancreatitis  7/13 Patient was transferred from medicine to surgical oncology service under the care of Dr. Pathak for puestow procedure  7/14 Cardiology was consulted for optimization. Patient is deemed intermediate risk for a low-moderate risk procedure  7/17 Patient was found to be hyperkalemic on AM labs. EKG demonstrated mild peaked T waves.   7/18 Nephrology was consulted for hyperkalemia and CKD. Recommended lokelma 10 mg TID, lasix, sodium bicarb tabs, and low K+ diet.  7/20: Patient taken to OR by Dr. Pathak and underwent Puestow procedure. Patient tolerated operation well and there were no post-operative complications identified. Patient remained hemodynamically stable in the PACU and transferred to the surgical floor. Overnight pt was found be hyperkalemic with have K of 6.5. Pt was given 1g calcium gluconate, 5u insulin, 1 amp dextrose and 50meq bicarb. EKG was performed and showed sinus tachycardia. Repeat K after shift 5.6.    INCOMPLETE        ****Diet was restarted and advanced as tolerated. Pain control was transitioned from IV to PO pain meds. At this time, patient is currently ambulating, voiding, tolerating a regular diet. Pain well controlled on PO pain meds. Patient has been deemed stable for discharge home with follow up as an outpatient.    61M with PMHx of chronic pancreatitis (complicated by chronic pain of which secondary to alcohol abuse), CKD4, hep C s/p SVR and benign esophageal stricture (post-stent 4/19/22 c/b need for CRE balloon dilation, reposition and suture on 4/29) presents with epigastric pain. Patient is AAOx3 but intermittently fell asleep during the interview and was able to provide some information. The pain is sharp, 10/10 in severity, and constant. Nothing improves or worsens the pain. He was able to tolerate some food but has stopped eating since today morning. He has been passing gas and had a BM yesterday. He denies any episode of nausea or vomiting after coming to the ED. He has been feeling nauseous intermittently. Otherwise, denies any fever, chills, nausea, chest pain, SOB or LE edema.   In the ED, his vitals were stable. Labs were notable for chronic anemia, YUNIEL on CKD. CT A/P showed redemonstration of findings of chronic pancreatitis.     7/6 GI was consulted for chronic pancreatitis. Recommended daily PPI, keeping esophageal stent in place, and consult surgery for potential Puestow procedure  7/7 Medicine managing YUNIEL superimposed on CKD. Serum creatinine trend daily with   7/10 Right forearm IV infiltrated. IV was placed at alternative location  7/12 Pain service was consulted for management of pain 2/2 chronic pancreatitis  7/13 Patient was transferred from medicine to surgical oncology service under the care of Dr. Pathak for puestow procedure  7/14 Cardiology was consulted for optimization. Patient is deemed intermediate risk for a low-moderate risk procedure  7/17 Patient was found to be hyperkalemic on AM labs. EKG demonstrated mild peaked T waves.   7/18 Nephrology was consulted for hyperkalemia and CKD. Recommended lokelma 10 mg TID, lasix, sodium bicarb tabs, and low K+ diet.  7/20: Patient taken to OR by Dr. Pathak and underwent Puestow procedure. Patient tolerated operation well and there were no post-operative complications identified. Patient remained hemodynamically stable in the PACU and transferred to the surgical floor. Overnight pt was found be hyperkalemic with have K of 6.5. Pt was given 1g calcium gluconate, 5u insulin, 1 amp dextrose and 50meq bicarb. EKG was performed and showed sinus tachycardia. Repeat K after shift 5.6.        ****Diet was restarted and advanced as tolerated. Pain control was transitioned from IV to PO pain meds. At this time, patient is currently ambulating, voiding, tolerating a regular diet. Pain well controlled on PO pain meds. Patient has been deemed stable for discharge home with follow up as an outpatient.    61M with PMHx of chronic pancreatitis (complicated by chronic pain of which secondary to alcohol abuse), CKD4, hep C s/p SVR and benign esophageal stricture (post-stent 4/19/22 c/b need for CRE balloon dilation, reposition and suture on 4/29) presents with epigastric pain. Patient is AAOx3 but intermittently fell asleep during the interview and was able to provide some information. The pain is sharp, 10/10 in severity, and constant. Nothing improves or worsens the pain. He was able to tolerate some food but has stopped eating since today morning. He has been passing gas and had a BM yesterday. He denies any episode of nausea or vomiting after coming to the ED. He has been feeling nauseous intermittently. Otherwise, denies any fever, chills, nausea, chest pain, SOB or LE edema.   In the ED, his vitals were stable. Labs were notable for chronic anemia, YUNIEL on CKD. CT A/P showed redemonstration of findings of chronic pancreatitis.     7/6 GI was consulted for chronic pancreatitis. Recommended daily PPI, keeping esophageal stent in place, and consult surgery for potential Puestow procedure  7/7 Medicine managing YUNIEL superimposed on CKD. Serum creatinine trend daily with   7/10 Right forearm IV infiltrated. IV was placed at alternative location  7/12 Pain service was consulted for management of pain 2/2 chronic pancreatitis  7/13 Patient was transferred from medicine to surgical oncology service under the care of Dr. Pathak for puestow procedure  7/14 Cardiology was consulted for optimization. Patient is deemed intermediate risk for a low-moderate risk procedure  7/17 Patient was found to be hyperkalemic on AM labs. EKG demonstrated mild peaked T waves.   7/18 Nephrology was consulted for hyperkalemia and CKD. Recommended lokelma 10 mg TID, lasix, sodium bicarb tabs, and low K+ diet.  7/20: Patient taken to OR by Dr. Pathak and underwent Puestow procedure. Patient tolerated operation well and there were no post-operative complications identified. Patient remained hemodynamically stable in the PACU and transferred to the surgical floor. Overnight pt was found be hyperkalemic with have K of 6.5. Pt was given 1g calcium gluconate, 5u insulin, 1 amp dextrose and 50meq bicarb. EKG was performed and showed sinus tachycardia. Repeat K after shift 5.6.  Nephrology following for hyperkalemia, patient was placed on Lasix 40mg IV BID which corrected potassium post operatively.  Diet was restarted and advanced as tolerated. Pain control was transitioned from IV to PO pain meds. At this time, patient is currently ambulating, voiding, tolerating a regular diet. Pain well controlled on PO pain meds. Patient has been deemed stable for discharge home with follow up as an outpatient.    61M with PMHx of chronic pancreatitis (complicated by chronic pain of which secondary to alcohol abuse), CKD4, hep C s/p SVR and benign esophageal stricture (post-stent 4/19/22 c/b need for CRE balloon dilation, reposition and suture on 4/29) presents with epigastric pain. Patient is AAOx3 but intermittently fell asleep during the interview and was able to provide some information. The pain is sharp, 10/10 in severity, and constant. Nothing improves or worsens the pain. He was able to tolerate some food but has stopped eating since today morning. He has been passing gas and had a BM yesterday. He denies any episode of nausea or vomiting after coming to the ED. He has been feeling nauseous intermittently. Otherwise, denies any fever, chills, nausea, chest pain, SOB or LE edema.   In the ED, his vitals were stable. Labs were notable for chronic anemia, YUNIEL on CKD. CT A/P showed redemonstration of findings of chronic pancreatitis.     7/6 GI was consulted for chronic pancreatitis. Recommended daily PPI, keeping esophageal stent in place, and consult surgery for potential Puestow procedure  7/7 Medicine managing YUNIEL superimposed on CKD. Serum creatinine trend daily with   7/10 Right forearm IV infiltrated. IV was placed at alternative location  7/12 Pain service was consulted for management of pain 2/2 chronic pancreatitis  7/13 Patient was transferred from medicine to surgical oncology service under the care of Dr. Pathak for puestow procedure  7/14 Cardiology was consulted for optimization. Patient is deemed intermediate risk for a low-moderate risk procedure  7/17 Patient was found to be hyperkalemic on AM labs. EKG demonstrated mild peaked T waves.   7/18 Nephrology was consulted for hyperkalemia and CKD. Recommended lokelma 10 mg TID, lasix, sodium bicarb tabs, and low K+ diet.  7/20: Patient taken to OR by Dr. Pathak and underwent Puestow procedure. Patient tolerated operation well and there were no post-operative complications identified. Patient remained hemodynamically stable in the PACU and transferred to the surgical floor. Overnight pt was found be hyperkalemic with have K of 6.5. Pt was given 1g calcium gluconate, 5u insulin, 1 amp dextrose and 50meq bicarb. EKG was performed and showed sinus tachycardia. Repeat K after shift 5.6.  Nephrology following for hyperkalemia, patient was placed on Lasix 40mg IV BID which corrected potassium post operatively.  Prior to discharge the patient was started on Nifedipine 30mg XL daily for bp control, Lasix 40mg BID per Nephrology for CKD w/ hyperkalemia, and   Diet was restarted and advanced as tolerated. Pain control was transitioned from IV to PO pain meds. At this time, patient is currently ambulating, voiding, tolerating a regular diet. Pain well controlled on PO pain meds. Patient has been deemed stable for discharge home with follow up as an outpatient.

## 2022-07-13 NOTE — DISCHARGE NOTE PROVIDER - CARE PROVIDER_API CALL
Nisha Pathak)  Complex General Surgical Oncology; Surgery  [address] [place] [address]  Phone: [phone]  Fax: [phone]  Follow Up Time: 1 week   Nisha Pathak)  Complex General Surgical Oncology; Surgery  [address] [place] [address]  Phone: [phone]  Fax: [phone]  Follow Up Time: 1 week    Marco Antonio Burks  CARDIOVASCULAR DISEASE  [address] [place] [address]  Phone: [phone]  Fax: [phone]  Follow Up Time: 2 weeks

## 2022-07-13 NOTE — DISCHARGE NOTE PROVIDER - NSDCFUSCHEDAPPT_GEN_ALL_CORE_FT
Gene Fraga  [facility]  NEPHRO OP [id number]  Scheduled Appointment: 07/20/2022    Lamberto Hurtado  [facility]  INTMED OP [id number]  Scheduled Appointment: 07/20/2022    Brayden Contreras  [facility]  INTMED [address]  Scheduled Appointment: 08/04/2022    Xander Dye  [facility]  PAINMGT [address]  Scheduled Appointment: 09/16/2022     Nihsa Pathak  [facility]ician [facility]  SURGONC UNDERWOOD [address]  Scheduled Appointment: 07/20/2022    Lamberto Hurtado  [facility]  [facility] [id number]  Scheduled Appointment: 07/27/2022    Brayden Contreras  [facility]  INTMED [address]  Scheduled Appointment: 08/04/2022    Xander Dye  [facility]  PAINMGT [address]  Scheduled Appointment: 09/16/2022     Lamberto Hurtado  [facility]  INTMED OP [id number]  Scheduled Appointment: 07/27/2022    Brayden Contreras  [facility]  INTMED [address]  Scheduled Appointment: 08/04/2022    Xander Dye  [facility]  PAINMGT [address]  Scheduled Appointment: 09/16/2022     Lamberto Hurtado  [facility] Physician [facility]  INTMED OP [id number] [facility]  Scheduled Appointment: 09/06/2022    Xander Dye  [facility] Physician [facility]  PAINMGT [address]  Scheduled Appointment: 09/16/2022    Cirilo Edwards  [facility]  [facility] Amb Surg Endoscopy  Scheduled Appointment: 09/23/2022    Gene Fraga  [facility] Physician [facility]  NEPHRO OP [id number] [facility]  Scheduled Appointment: 10/19/2022

## 2022-07-13 NOTE — PROGRESS NOTE ADULT - PROBLEM SELECTOR PLAN 4
Most likely due to AOCD. Likely has component of THO as well given low ferritin. Trend for now.
Most likely due to AOCD. Likely has component of THO as well given low ferritin. Trend for now.
YUNIEL on CKD, most likely due to decreased PO intake  -Cr improved today.  -C/w NS @ 100 cc per hr   -UA is grossly normal   -Trend Scr daily
Most likely due to AOCD. Likely has component of THO as well given low ferritin. Trend for now.
Most likely due to AOCD. Likely has component of THO as wlel given low ferritin. Trend for now.
Most likely due to AOCD. Likely has component of THO as well given low ferritin. Trend for now.
Most likely due to AOCD. Likely has component of THO as well given low ferritin. Trend for now.

## 2022-07-13 NOTE — PROGRESS NOTE ADULT - SUBJECTIVE AND OBJECTIVE BOX
HPI:  61M with PMHx of ETOH abuse, chronic pancreatitis with chronic PD dilation, PD stone and calcifications, perforated gastric ulcer s/p exlap with David patch repair (2019, Dr. Murray), CKD4, hep C s/p SVR and benign esophageal stricture (post-stent 4/19/22 c/b need for CRE balloon dilation, reposition and suture on 4/29) presents with epigastric pain. Patient reports 2 days of abdominal pain. Denies nausea, vomiting. Endorses flatus and BMs.    Surgical oncology consulted to evaluate if patient is a candidate for the Puestow procedure. Patient very somnolent in ED, most of history of obtained from chart review. Patient has an extensive history of chronic pancreatitis with associated pancreatic calcifications, pancreatic ductal dilatation of the body and tail to 1.6cm with an associated obstructing calculi dating as far back as 12/2021. Patient's most recent imaging studies have been done without IV contrast due to CKD, thus limiting evaluation. GI unable to intervene due to patient's esophageal stent.    Subjective: Patient seen and examined. no events overnight. denies abdominal pain worse than usual, N/V.    VITALS:  T(C): 36.6 (07-13-22 @ 05:39), Max: 37.1 (07-13-22 @ 02:57)  HR: 78 (07-13-22 @ 05:39) (65 - 84)  BP: 100/60 (07-13-22 @ 05:39) (100/60 - 165/90)  RR: 18 (07-13-22 @ 05:39) (18 - 18)  SpO2: 100% (07-13-22 @ 05:39) (100% - 100%)  Wt(kg): --      PHYSICAL EXAM:  General: AAOx3, no acute distress.  Respiratory: breathing comfortably, no increased WOB   Abdomen: soft, mild epigastric tenderness, nondistended, no rebound, no guarding  Extremities: Moves all four.     LABS:                          8.5    8.26  )-----------( 236      ( 13 Jul 2022 06:10 )             26.9     07-13    138  |  108<H>  |  37<H>  ----------------------------<  153<H>  4.7   |  19<L>  |  2.39<H>    Ca    8.5      13 Jul 2022 06:10  Phos  2.9     07-13  Mg     1.60     07-13

## 2022-07-13 NOTE — DIETITIAN INITIAL EVALUATION ADULT - NS FNS WEIGHT CHANGE REASON
12/2021 wt: 143 pounds   5/6/22: 127 pounds   6/11/22: 117 pounds   Pt's current dosing wt (72 kg/156 pounds is likely erroneous)

## 2022-07-13 NOTE — PROGRESS NOTE ADULT - PROBLEM SELECTOR PLAN 1
Patient with acute on chronic epigastric pain, found to have chronic pancreatitis with stones in the pancreatic duct on CT A/P   - GI recs appreciated. C/w PPI daily  - Plan for Puestow procedure on 7/20 w/ Gen Surgery   - Cont Oxy ER 15 BID + Oxy 10/15 PRN pain  - PCP Dr. Orantes willing to take over prescription of Oxy ER & IR on discharge   -Currently on a regular diet. Monitor for intolerance - continues to tolerate w/o issues.  [ ] Nutrition c/s

## 2022-07-13 NOTE — DISCHARGE NOTE PROVIDER - PROVIDER TOKENS
PROVIDER:[TOKEN:[[id number]],FOLLOWUP:[1 week]] PROVIDER:[TOKEN:[[id number]],FOLLOWUP:[1 week]],PROVIDER:[TOKEN:[[id number]],FOLLOWUP:[2 weeks]]

## 2022-07-13 NOTE — DISCHARGE NOTE PROVIDER - NSDCFUADDAPPT_GEN_ALL_CORE_FT
F/u with Gastroenterology, Dr Edwards in 1-2 months for esophageal stent removal. Please call [phone] to schedule an appointment

## 2022-07-13 NOTE — PROGRESS NOTE ADULT - SUBJECTIVE AND OBJECTIVE BOX
Merlin Mathew, MD   Hospitalist  Pager #[phone]    PROGRESS NOTE:     Patient is a 61y old  Male who presents with a chief complaint of abdominal pain (13 Jul 2022 12:41)      SUBJECTIVE / OVERNIGHT EVENTS: NEON   Patient continues to have abdominal pain, hiccups return when he wakes up   Reports pain has improved to chronic level of pain, improved from severe pain he presented with initially     ADDITIONAL REVIEW OF SYSTEMS:    MEDICATIONS  (STANDING):  acetaminophen     Tablet .. 650 milliGRAM(s) Oral every 6 hours  ascorbic acid 500 milliGRAM(s) Oral daily  cyclobenzaprine 5 milliGRAM(s) Oral three times a day  ferrous    sulfate 325 milliGRAM(s) Oral daily  heparin   Injectable 5000 Unit(s) SubCutaneous every 12 hours  lidocaine   4% Patch 1 Patch Transdermal every 24 hours  oxyCODONE  ER Tablet 15 milliGRAM(s) Oral every 12 hours  pantoprazole    Tablet 40 milliGRAM(s) Oral before breakfast  sodium bicarbonate 650 milliGRAM(s) Oral two times a day    MEDICATIONS  (PRN):  aluminum hydroxide/magnesium hydroxide/simethicone Suspension 30 milliLiter(s) Oral every 4 hours PRN Dyspepsia  melatonin 3 milliGRAM(s) Oral at bedtime PRN Insomnia  ondansetron Injectable 4 milliGRAM(s) IV Push every 8 hours PRN Nausea and/or Vomiting  oxyCODONE    IR 15 milliGRAM(s) Oral every 4 hours PRN Severe Pain (7 - 10)  oxyCODONE    IR 10 milliGRAM(s) Oral every 4 hours PRN Moderate Pain (4 - 6)      CAPILLARY BLOOD GLUCOSE        I&O's Summary      PHYSICAL EXAM:  Vital Signs Last 24 Hrs  T(C): 36.4 (13 Jul 2022 12:44), Max: 37.1 (13 Jul 2022 02:57)  T(F): 97.6 (13 Jul 2022 12:44), Max: 98.8 (13 Jul 2022 02:57)  HR: 72 (13 Jul 2022 12:44) (65 - 84)  BP: 140/84 (13 Jul 2022 12:44) (100/60 - 165/90)  BP(mean): --  RR: 18 (13 Jul 2022 12:44) (18 - 18)  SpO2: 100% (13 Jul 2022 12:44) (100% - 100%)    Parameters below as of 13 Jul 2022 12:44  Patient On (Oxygen Delivery Method): room air        CONSTITUTIONAL: NAD, well-developed thin male   RESPIRATORY: Normal respiratory effort; lungs are clear to auscultation bilaterally  CARDIOVASCULAR: Regular rate and rhythm, normal S1 and S2, no murmur/rub/gallop; No lower extremity edema; Peripheral pulses are 2+ bilaterally  ABDOMEN: + epigastric TTP; ortherwise nontender to palpation, normoactive bowel sounds, no rebound/guarding; No hepatosplenomegaly  MUSCULOSKELETAL no clubbing or cyanosis of digits; no joint swelling or tenderness to palpation  PSYCH: A+O to person, place, and time; affect appropriate    LABS:                        8.5    8.26  )-----------( 236      ( 13 Jul 2022 06:10 )             26.9     07-13    138  |  108<H>  |  37<H>  ----------------------------<  153<H>  4.7   |  19<L>  |  2.39<H>    Ca    8.5      13 Jul 2022 06:10  Phos  2.9     07-13  Mg     1.60     07-13                  RADIOLOGY & ADDITIONAL TESTS:  Results Reviewed:   Imaging Personally Reviewed:  Electrocardiogram Personally Reviewed:    COORDINATION OF CARE:  Care Discussed with Consultants/Other Providers [Y/N]:  Prior or Outpatient Records Reviewed [Y/N]:

## 2022-07-13 NOTE — PROGRESS NOTE ADULT - ASSESSMENT
61M with PMHx of ETOH abuse, chronic pancreatitis with chronic PD dilation, PD stone and calcifications, perforated gastric ulcer s/p exlap with David patch repair (2019, Dr. Murray), CKD4, hep C s/p SVR and benign esophageal stricture (post-stent 4/19/22 c/b need for CRE balloon dilation, reposition and suture on 4/29) presents with a flare of chronic pancreatitis. Surgical oncology consulted to evaluate for Puestow procedure.    Plan/Recommendations:  - plan for OR next Wednesday   - patient intermediate risk per medicine  - medical optimization prior to OR  - plan per primary team    Lydia Howell, PGY3  D Team Surgery   [id number]

## 2022-07-13 NOTE — DIETITIAN INITIAL EVALUATION ADULT - NS FNS DIET ORDER
Diet, Regular:   Supplement Feeding Modality:  Oral  Ensure Clear Cans or Servings Per Day:  1       Frequency:  Two Times a day (07-13-22 @ 15:10)

## 2022-07-13 NOTE — PROGRESS NOTE ADULT - PROBLEM SELECTOR PROBLEM 5
Preventive measure
Anemia
Preventive measure
Preventive measure

## 2022-07-13 NOTE — DIETITIAN INITIAL EVALUATION ADULT - PERTINENT LABORATORY DATA
07-13    138  |  108<H>  |  37<H>  ----------------------------<  153<H>  4.7   |  19<L>  |  2.39<H>    Ca    8.5      13 Jul 2022 06:10  Phos  2.9     07-13  Mg     1.60     07-13    A1C with Estimated Average Glucose Result: 5.5 % (04-29-22 @ 05:36)  A1C with Estimated Average Glucose Result: 5.5 % (02-09-22 @ 17:47)

## 2022-07-13 NOTE — DIETITIAN NUTRITION RISK NOTIFICATION - TREATMENT: THE FOLLOWING DIET HAS BEEN RECOMMENDED
Diet, Regular:   Supplement Feeding Modality:  Oral  Ensure Clear Cans or Servings Per Day:  1       Frequency:  Two Times a day (07-13-22 @ 15:10) [Active]

## 2022-07-13 NOTE — DIETITIAN INITIAL EVALUATION ADULT - REASON FOR ADMISSION
Abdominal pain  61M with PMHx of chronic pancreatitis (complicated by chronic pain of which secondary to alcohol abuse), CKD4, hep C s/p SVR and benign esophageal stricture (post-stent 4/19/22 c/b need for CRE balloon dilation, reposition and suture on 4/29) presents with epigastric pain, most likely due to chronic pancreatic duct obstruction. Surgery on board.

## 2022-07-13 NOTE — DISCHARGE NOTE PROVIDER - NSDCMRMEDTOKEN_GEN_ALL_CORE_FT
bisacodyl 5 mg oral delayed release tablet: 1 tab(s) orally once a day (at bedtime)  chlorproMAZINE 10 mg oral tablet: 1 tab(s) orally every 8 hours, As needed, hiccuping MDD:30mg  Creon 6000 units oral delayed release capsule: 2 cap(s) orally 3 times a day  cyclobenzaprine 5 mg oral tablet: 1 tab(s) orally 3 times a day, As needed, hiccuping MDD:15mg  folic acid 1 mg oral tablet: 1 tab(s) orally once a day  gabapentin 300 mg oral capsule: 1 cap(s) orally once a day (at bedtime)  MiraLax oral powder for reconstitution: 17 gram(s) orally 2 times a day  oxycodone-acetaminophen 10 mg-325 mg oral tablet: 1 tab(s) orally every 4 hours, As Needed for pain MDD:6   pantoprazole 40 mg oral delayed release tablet: 1 tab(s) orally 2 times a day  Percocet 10/325 oral tablet: 1 tab(s) orally every 4 hours, As Needed  senna oral tablet: 2 tab(s) orally once a day (at bedtime)  thiamine 100 mg oral tablet: 1 tab(s) orally once a day   bisacodyl 5 mg oral delayed release tablet: 1 tab(s) orally once a day (at bedtime)  chlorproMAZINE 10 mg oral tablet: 1 tab(s) orally every 8 hours, As needed, hiccuping MDD:30mg  Creon 6000 units oral delayed release capsule: 2 cap(s) orally 3 times a day  cyclobenzaprine 5 mg oral tablet: 1 tab(s) orally 3 times a day, As needed, hiccuping MDD:15mg  folic acid 1 mg oral tablet: 1 tab(s) orally once a day  gabapentin 300 mg oral capsule: 1 cap(s) orally once a day (at bedtime)  MiraLax oral powder for reconstitution: 17 gram(s) orally 2 times a day  oxycodone-acetaminophen 10 mg-325 mg oral tablet: 1 tab(s) orally every 4 hours, As Needed for pain MDD:6   pantoprazole 40 mg oral delayed release tablet: 1 tab(s) orally 2 times a day  Percocet 10/325 oral tablet: 1 tab(s) orally every 4 hours, As Needed  rolling walker:   senna oral tablet: 2 tab(s) orally once a day (at bedtime)  thiamine 100 mg oral tablet: 1 tab(s) orally once a day   acetaminophen 325 mg oral tablet: 2 tab(s) orally every 6 hours  bisacodyl 5 mg oral delayed release tablet: 1 tab(s) orally once a day (at bedtime)  cyclobenzaprine 5 mg oral tablet: 1 tab(s) orally 3 times a day, As needed, hiccuping MDD:15mg  folic acid 1 mg oral tablet: 1 tab(s) orally once a day  furosemide 40 mg oral tablet: 1 tab(s) orally every 12 hours  MiraLax oral powder for reconstitution: 17 gram(s) orally 2 times a day  NIFEdipine 30 mg oral tablet, extended release: 1 tab(s) orally once a day  oxyCODONE 15 mg oral tablet: 1 tab(s) orally every 3 hours, As Needed -SEVERE pain MDD:8    hold for oversedation  do not combine with other opioids  Narcan at home if needed  pantoprazole 40 mg oral delayed release tablet: 1 tab(s) orally once a day (before a meal)  rolling walker:   thiamine 100 mg oral tablet: 1 tab(s) orally once a day   acetaminophen 325 mg oral tablet: 2 tab(s) orally every 6 hours  bisacodyl 5 mg oral delayed release tablet: 1 tab(s) orally once a day (at bedtime)  cyclobenzaprine 5 mg oral tablet: 1 tab(s) orally 3 times a day, As needed, hiccuping MDD:15mg  folic acid 1 mg oral tablet: 1 tab(s) orally once a day  furosemide 40 mg oral tablet: 1 tab(s) orally every 12 hours  MiraLax oral powder for reconstitution: 17 gram(s) orally 2 times a day  Narcan 4 mg/0.1 mL nasal spray: 4 milligram(s) intranasally once.  May repeat every 2-3 minutes, alternating nostrils until medical assistance becomes available  NIFEdipine 30 mg oral tablet, extended release: 1 tab(s) orally once a day  oxyCODONE 15 mg oral tablet: 1 tab(s) orally every 4 hours, As Needed -Severe Pain (7 - 10) MDD:6  pantoprazole 40 mg oral delayed release tablet: 1 tab(s) orally once a day (before a meal)  rolling walker:   thiamine 100 mg oral tablet: 1 tab(s) orally once a day

## 2022-07-14 LAB
ANION GAP SERPL CALC-SCNC: 10 MMOL/L — SIGNIFICANT CHANGE UP (ref 7–14)
BUN SERPL-MCNC: 39 MG/DL — HIGH (ref 7–23)
CALCIUM SERPL-MCNC: 8.3 MG/DL — LOW (ref 8.4–10.5)
CHLORIDE SERPL-SCNC: 112 MMOL/L — HIGH (ref 98–107)
CO2 SERPL-SCNC: 17 MMOL/L — LOW (ref 22–31)
CREAT SERPL-MCNC: 2.44 MG/DL — HIGH (ref 0.5–1.3)
EGFR: 29 ML/MIN/1.73M2 — LOW
GLUCOSE SERPL-MCNC: 147 MG/DL — HIGH (ref 70–99)
HCT VFR BLD CALC: 25.1 % — LOW (ref 39–50)
HGB BLD-MCNC: 7.9 G/DL — LOW (ref 13–17)
MAGNESIUM SERPL-MCNC: 1.5 MG/DL — LOW (ref 1.6–2.6)
MCHC RBC-ENTMCNC: 31.2 PG — SIGNIFICANT CHANGE UP (ref 27–34)
MCHC RBC-ENTMCNC: 31.5 GM/DL — LOW (ref 32–36)
MCV RBC AUTO: 99.2 FL — SIGNIFICANT CHANGE UP (ref 80–100)
NRBC # BLD: 0 /100 WBCS — SIGNIFICANT CHANGE UP
NRBC # FLD: 0 K/UL — SIGNIFICANT CHANGE UP
PHOSPHATE SERPL-MCNC: 2.7 MG/DL — SIGNIFICANT CHANGE UP (ref 2.5–4.5)
PLATELET # BLD AUTO: 229 K/UL — SIGNIFICANT CHANGE UP (ref 150–400)
POTASSIUM SERPL-MCNC: 5.1 MMOL/L — SIGNIFICANT CHANGE UP (ref 3.5–5.3)
POTASSIUM SERPL-SCNC: 5.1 MMOL/L — SIGNIFICANT CHANGE UP (ref 3.5–5.3)
PREALB SERPL-MCNC: 24 MG/DL — SIGNIFICANT CHANGE UP (ref 20–40)
RBC # BLD: 2.53 M/UL — LOW (ref 4.2–5.8)
RBC # FLD: 15.7 % — HIGH (ref 10.3–14.5)
SODIUM SERPL-SCNC: 139 MMOL/L — SIGNIFICANT CHANGE UP (ref 135–145)
WBC # BLD: 6.76 K/UL — SIGNIFICANT CHANGE UP (ref 3.8–10.5)
WBC # FLD AUTO: 6.76 K/UL — SIGNIFICANT CHANGE UP (ref 3.8–10.5)

## 2022-07-14 PROCEDURE — 93306 TTE W/DOPPLER COMPLETE: CPT | Mod: 26

## 2022-07-14 RX ORDER — MAGNESIUM SULFATE 500 MG/ML
2 VIAL (ML) INJECTION ONCE
Refills: 0 | Status: COMPLETED | OUTPATIENT
Start: 2022-07-14 | End: 2022-07-14

## 2022-07-14 RX ADMIN — OXYCODONE HYDROCHLORIDE 15 MILLIGRAM(S): 5 TABLET ORAL at 05:52

## 2022-07-14 RX ADMIN — OXYCODONE HYDROCHLORIDE 15 MILLIGRAM(S): 5 TABLET ORAL at 09:51

## 2022-07-14 RX ADMIN — PANTOPRAZOLE SODIUM 40 MILLIGRAM(S): 20 TABLET, DELAYED RELEASE ORAL at 05:54

## 2022-07-14 RX ADMIN — CYCLOBENZAPRINE HYDROCHLORIDE 5 MILLIGRAM(S): 10 TABLET, FILM COATED ORAL at 14:55

## 2022-07-14 RX ADMIN — OXYCODONE HYDROCHLORIDE 15 MILLIGRAM(S): 5 TABLET ORAL at 18:15

## 2022-07-14 RX ADMIN — CYCLOBENZAPRINE HYDROCHLORIDE 5 MILLIGRAM(S): 10 TABLET, FILM COATED ORAL at 05:53

## 2022-07-14 RX ADMIN — OXYCODONE HYDROCHLORIDE 15 MILLIGRAM(S): 5 TABLET ORAL at 17:17

## 2022-07-14 RX ADMIN — OXYCODONE HYDROCHLORIDE 10 MILLIGRAM(S): 5 TABLET ORAL at 14:39

## 2022-07-14 RX ADMIN — OXYCODONE HYDROCHLORIDE 10 MILLIGRAM(S): 5 TABLET ORAL at 13:40

## 2022-07-14 RX ADMIN — OXYCODONE HYDROCHLORIDE 15 MILLIGRAM(S): 5 TABLET ORAL at 11:39

## 2022-07-14 RX ADMIN — OXYCODONE HYDROCHLORIDE 15 MILLIGRAM(S): 5 TABLET ORAL at 21:28

## 2022-07-14 RX ADMIN — HEPARIN SODIUM 5000 UNIT(S): 5000 INJECTION INTRAVENOUS; SUBCUTANEOUS at 17:18

## 2022-07-14 RX ADMIN — Medication 650 MILLIGRAM(S): at 05:53

## 2022-07-14 RX ADMIN — OXYCODONE HYDROCHLORIDE 15 MILLIGRAM(S): 5 TABLET ORAL at 12:40

## 2022-07-14 RX ADMIN — Medication 25 GRAM(S): at 10:57

## 2022-07-14 RX ADMIN — Medication 325 MILLIGRAM(S): at 13:42

## 2022-07-14 RX ADMIN — CYCLOBENZAPRINE HYDROCHLORIDE 5 MILLIGRAM(S): 10 TABLET, FILM COATED ORAL at 21:28

## 2022-07-14 RX ADMIN — HEPARIN SODIUM 5000 UNIT(S): 5000 INJECTION INTRAVENOUS; SUBCUTANEOUS at 05:53

## 2022-07-14 RX ADMIN — Medication 500 MILLIGRAM(S): at 13:43

## 2022-07-14 RX ADMIN — OXYCODONE HYDROCHLORIDE 15 MILLIGRAM(S): 5 TABLET ORAL at 06:39

## 2022-07-14 RX ADMIN — Medication 650 MILLIGRAM(S): at 17:18

## 2022-07-14 RX ADMIN — OXYCODONE HYDROCHLORIDE 15 MILLIGRAM(S): 5 TABLET ORAL at 00:18

## 2022-07-14 NOTE — CONSULT NOTE ADULT - SUBJECTIVE AND OBJECTIVE BOX
DATE OF SERVICE: 07-14-22 @ 21:46    CHIEF COMPLAINT:Patient is a 61y old  Male who presents with a chief complaint of abdominal pain (14 Jul 2022 08:58)      HISTORY OF PRESENT ILLNESS:HPI:  61M with PMHx of chronic pancreatitis (complicated by chronic pain of which secondary to alcohol abuse), CKD4, hep C s/p SVR and benign esophageal stricture (post-stent 4/19/22 c/b need for CRE balloon dilation, reposition and suture on 4/29) presents with epigastric pain. Patient is AAOx3 but intermittently fell asleep during the interview and was able to provide some information. The pain is sharp, 10/10 in severity, and constant. Nothing improves or worsens the pain. He was able to tolerate some food but has stopped eating since today morning. He has been passing gas and had a BM yesterday. He denies any episode of nausea or vomiting after coming to the ED. He has been feeling nauseous intermittently. Otherwise, denies any fever, chills, nausea, chest pain, SOB or LE edema.   In the ED, his vitals were stable. Labs were notable for chronic anemia, YUNIEL on CKD. CT A/P showed redemonstration of findings of chronic pancreatitis.  (06 Jul 2022 17:37)      PAST MEDICAL & SURGICAL HISTORY:  ETOH abuse  sober x1 year approximately      Pancreatitis      Hepatitis C  treated      Gout      HTN (hypertension)      Chronic kidney disease (CKD)      H/O chronic pancreatitis      Gout      Alcohol abuse      Gastric perforation              MEDICATIONS:  heparin   Injectable 5000 Unit(s) SubCutaneous every 12 hours        acetaminophen     Tablet .. 650 milliGRAM(s) Oral every 6 hours  cyclobenzaprine 5 milliGRAM(s) Oral three times a day  melatonin 3 milliGRAM(s) Oral at bedtime PRN  ondansetron Injectable 4 milliGRAM(s) IV Push every 8 hours PRN  oxyCODONE    IR 10 milliGRAM(s) Oral every 4 hours PRN  oxyCODONE    IR 15 milliGRAM(s) Oral every 4 hours PRN  oxyCODONE  ER Tablet 15 milliGRAM(s) Oral every 12 hours    aluminum hydroxide/magnesium hydroxide/simethicone Suspension 30 milliLiter(s) Oral every 4 hours PRN  pantoprazole    Tablet 40 milliGRAM(s) Oral before breakfast  polyethylene glycol 3350 17 Gram(s) Oral daily  senna 1 Tablet(s) Oral at bedtime      ascorbic acid 500 milliGRAM(s) Oral daily  ferrous    sulfate 325 milliGRAM(s) Oral daily  lidocaine   4% Patch 1 Patch Transdermal every 24 hours  sodium bicarbonate 650 milliGRAM(s) Oral two times a day      FAMILY HISTORY:  FH: HTN (hypertension)        Non-contributory    SOCIAL HISTORY:    [ ] not a current smoker    Allergies    No Known Allergies    Intolerances    	    REVIEW OF SYSTEMS:  CONSTITUTIONAL: No fever  EYES: No eye pain, visual disturbances, or discharge  ENMT:  No difficulty hearing, tinnitus  NECK: No pain or stiffness  RESPIRATORY: No cough, wheezing,  CARDIOVASCULAR: No chest pain, palpitations, passing out, dizziness, or leg swelling  GASTROINTESTINAL:  See HPI  GENITOURINARY: No dysuria, hematuria  NEUROLOGICAL: No stroke like symptoms  SKIN: No burning or lesions   ENDOCRINE: No heat or cold intolerance  MUSCULOSKELETAL: No joint pain or swelling  PSYCHIATRIC: No  anxiety, mood swings  HEME/LYMPH: No bleeding gums  ALLERGY AND IMMUNOLOGIC: No hives or eczema	    All other ROS negative    PHYSICAL EXAM:  T(C): 36.7 (07-14-22 @ 21:15), Max: 36.7 (07-14-22 @ 21:15)  HR: 76 (07-14-22 @ 21:15) (73 - 86)  BP: 134/69 (07-14-22 @ 21:15) (109/62 - 138/78)  RR: 18 (07-14-22 @ 21:15) (17 - 18)  SpO2: 100% (07-14-22 @ 21:15) (99% - 100%)  Wt(kg): --  I&O's Summary      Appearance: Normal	  HEENT:   Normal oral mucosa, EOMI	  Cardiovascular:  S1 S2, No JVD,    Respiratory: Lungs clear to auscultation	  Psychiatry: Alert  Gastrointestinal:  Soft, Non-tender, + BS	  Skin: No rashes   Neurologic: Non-focal  Extremities:  No edema  Vascular: Peripheral pulses palpable    	    	  	  CARDIAC MARKERS:  Labs personally reviewed by me                                  7.9    6.76  )-----------( 229      ( 14 Jul 2022 05:28 )             25.1     07-14    139  |  112<H>  |  39<H>  ----------------------------<  147<H>  5.1   |  17<L>  |  2.44<H>    Ca    8.3<L>      14 Jul 2022 05:28  Phos  2.7     07-14  Mg     1.50     07-14            EKG: Personally reviewed by me - NSR  Radiology: Personally reviewed by me - CXR clear lungs    TTE CONCLUSIONS:  1. Mitral annular calcification, otherwise normal mitral  valve. Minimal mitral regurgitation.  2. Calcified trileaflet aortic valve with normal opening.  Mild-moderate aortic regurgitation.  3. Normal left ventricular internal dimensions and wall  thicknesses.  4. Normal left ventricular systolic function. No segmental  wall motion abnormalities.  5. Normal right ventricular size and function.    Assessment /Plan:   Assessment and Plan:   · Assessment	  61M with PMHx of chronic pancreatitis (complicated by chronic pain of which secondary to alcohol abuse), CKD4, hep C s/p SVR and benign esophageal stricture (post-stent 4/19/22 c/b need for CRE balloon dilation, reposition and suture on 4/29) presents with epigastric pain, most likely due to chronic pancreatic duct obstruction. Surgery on board.     Problem/Plan - 1:  ·  Problem: Preop Risk stratification  - intermediate risk for low-mod risk procedure  - no severe AS/MS, no decompensated HF, no ACS, no tachy-jacquelyn arrythmia  - no cardiac contraindication to proceed            Differential diagnosis and plan of care discussed with patient after the evaluation. Counseling on diet, nutritional counseling, weight management, exercise and medication compliance was done.   Advanced care planning/advanced directives discussed with patient/family. DNR status including forceful chest compressions to attempt to restart the heart, ventilator support/artificial breathing, electric shock, artificial nutrition, health care proxy, Molst form all discussed with pt. Pt wishes to consider. More than fifteen minutes spent on discussing advanced directives.          Talib Davison DO [facility]  Cardiovascular Medicine  [address] [place] [address], [address]  Office [phone]  Cell [phone]

## 2022-07-14 NOTE — PROGRESS NOTE ADULT - ASSESSMENT
61M with PMHx of ETOH abuse, chronic pancreatitis with chronic PD dilation, PD stone and calcifications, perforated gastric ulcer s/p exlap with David patch repair (2019, Dr. Murray), CKD4, hep C s/p SVR and benign esophageal stricture (post-stent 4/19/22 c/b need for CRE balloon dilation, reposition and suture on 4/29) presents with a flare of chronic pancreatitis. Surgical oncology consulted to evaluate for Puestow procedure.    Plan/Recommendations:  - plan for OR next Wednesday   - patient intermediate risk per medicine  - Echo ordered, will f/u  - continue diet as tolerated    Lydia Howell, PGY3  D Team Surgery   [id number]

## 2022-07-14 NOTE — PROGRESS NOTE ADULT - SUBJECTIVE AND OBJECTIVE BOX
7.9    6.76  )-----------( 229      ( 14 Jul 2022 05:28 )             25.1     07-14    139  |  112<H>  |  39<H>  ----------------------------<  147<H>  5.1   |  17<L>  |  2.44<H>    Ca    8.3<L>      14 Jul 2022 05:28  Phos  2.7     07-14  Mg     1.50     07-14            HPI:  61M with PMHx of ETOH abuse, chronic pancreatitis with chronic PD dilation, PD stone and calcifications, perforated gastric ulcer s/p exlap with David patch repair (2019, Dr. Murray), CKD4, hep C s/p SVR and benign esophageal stricture (post-stent 4/19/22 c/b need for CRE balloon dilation, reposition and suture on 4/29) presents with epigastric pain. Patient reports 2 days of abdominal pain. Denies nausea, vomiting. Endorses flatus and BMs.    Surgical oncology consulted to evaluate if patient is a candidate for the Puestow procedure. Patient very somnolent in ED, most of history of obtained from chart review. Patient has an extensive history of chronic pancreatitis with associated pancreatic calcifications, pancreatic ductal dilatation of the body and tail to 1.6cm with an associated obstructing calculi dating as far back as 12/2021. Patient's most recent imaging studies have been done without IV contrast due to CKD, thus limiting evaluation. GI unable to intervene due to patient's esophageal stent.    Subjective: Patient seen and examined. no events overnight. denies abdominal pain worse than usual, N/V.    VITALS:  T(C): 36.4 (07-14-22 @ 05:50), Max: 37.2 (07-13-22 @ 21:10)  HR: 82 (07-14-22 @ 05:50) (72 - 82)  BP: 109/62 (07-14-22 @ 05:50) (109/62 - 140/84)  RR: 18 (07-14-22 @ 05:50) (18 - 18)  SpO2: 100% (07-14-22 @ 05:50) (98% - 100%)  Wt(kg): --      PHYSICAL EXAM:  General: AAOx3, no acute distress.  Respiratory: breathing comfortably, no increased WOB   Abdomen: soft, mild epigastric tenderness, nondistended, no rebound, no guarding  Extremities: Moves all four.     LABS:

## 2022-07-15 LAB
ANION GAP SERPL CALC-SCNC: 8 MMOL/L — SIGNIFICANT CHANGE UP (ref 7–14)
BUN SERPL-MCNC: 39 MG/DL — HIGH (ref 7–23)
CALCIUM SERPL-MCNC: 8.3 MG/DL — LOW (ref 8.4–10.5)
CHLORIDE SERPL-SCNC: 114 MMOL/L — HIGH (ref 98–107)
CO2 SERPL-SCNC: 19 MMOL/L — LOW (ref 22–31)
CREAT SERPL-MCNC: 2.39 MG/DL — HIGH (ref 0.5–1.3)
EGFR: 30 ML/MIN/1.73M2 — LOW
GLUCOSE SERPL-MCNC: 185 MG/DL — HIGH (ref 70–99)
HCT VFR BLD CALC: 25.5 % — LOW (ref 39–50)
HGB BLD-MCNC: 7.7 G/DL — LOW (ref 13–17)
MAGNESIUM SERPL-MCNC: 1.8 MG/DL — SIGNIFICANT CHANGE UP (ref 1.6–2.6)
MCHC RBC-ENTMCNC: 30.2 GM/DL — LOW (ref 32–36)
MCHC RBC-ENTMCNC: 30.9 PG — SIGNIFICANT CHANGE UP (ref 27–34)
MCV RBC AUTO: 102.4 FL — HIGH (ref 80–100)
NRBC # BLD: 0 /100 WBCS — SIGNIFICANT CHANGE UP
NRBC # FLD: 0 K/UL — SIGNIFICANT CHANGE UP
PHOSPHATE SERPL-MCNC: 2.8 MG/DL — SIGNIFICANT CHANGE UP (ref 2.5–4.5)
PLATELET # BLD AUTO: 193 K/UL — SIGNIFICANT CHANGE UP (ref 150–400)
POTASSIUM SERPL-MCNC: 5.3 MMOL/L — SIGNIFICANT CHANGE UP (ref 3.5–5.3)
POTASSIUM SERPL-SCNC: 5.3 MMOL/L — SIGNIFICANT CHANGE UP (ref 3.5–5.3)
RBC # BLD: 2.49 M/UL — LOW (ref 4.2–5.8)
RBC # FLD: 15.9 % — HIGH (ref 10.3–14.5)
SODIUM SERPL-SCNC: 141 MMOL/L — SIGNIFICANT CHANGE UP (ref 135–145)
WBC # BLD: 8.09 K/UL — SIGNIFICANT CHANGE UP (ref 3.8–10.5)
WBC # FLD AUTO: 8.09 K/UL — SIGNIFICANT CHANGE UP (ref 3.8–10.5)

## 2022-07-15 RX ADMIN — OXYCODONE HYDROCHLORIDE 15 MILLIGRAM(S): 5 TABLET ORAL at 05:40

## 2022-07-15 RX ADMIN — Medication 500 MILLIGRAM(S): at 13:47

## 2022-07-15 RX ADMIN — OXYCODONE HYDROCHLORIDE 15 MILLIGRAM(S): 5 TABLET ORAL at 19:42

## 2022-07-15 RX ADMIN — HEPARIN SODIUM 5000 UNIT(S): 5000 INJECTION INTRAVENOUS; SUBCUTANEOUS at 17:36

## 2022-07-15 RX ADMIN — OXYCODONE HYDROCHLORIDE 15 MILLIGRAM(S): 5 TABLET ORAL at 14:58

## 2022-07-15 RX ADMIN — CYCLOBENZAPRINE HYDROCHLORIDE 5 MILLIGRAM(S): 10 TABLET, FILM COATED ORAL at 13:47

## 2022-07-15 RX ADMIN — PANTOPRAZOLE SODIUM 40 MILLIGRAM(S): 20 TABLET, DELAYED RELEASE ORAL at 04:44

## 2022-07-15 RX ADMIN — SENNA PLUS 1 TABLET(S): 8.6 TABLET ORAL at 23:09

## 2022-07-15 RX ADMIN — Medication 650 MILLIGRAM(S): at 17:35

## 2022-07-15 RX ADMIN — OXYCODONE HYDROCHLORIDE 15 MILLIGRAM(S): 5 TABLET ORAL at 10:28

## 2022-07-15 RX ADMIN — LIDOCAINE 1 PATCH: 4 CREAM TOPICAL at 23:10

## 2022-07-15 RX ADMIN — OXYCODONE HYDROCHLORIDE 15 MILLIGRAM(S): 5 TABLET ORAL at 11:28

## 2022-07-15 RX ADMIN — HEPARIN SODIUM 5000 UNIT(S): 5000 INJECTION INTRAVENOUS; SUBCUTANEOUS at 04:44

## 2022-07-15 RX ADMIN — CYCLOBENZAPRINE HYDROCHLORIDE 5 MILLIGRAM(S): 10 TABLET, FILM COATED ORAL at 23:09

## 2022-07-15 RX ADMIN — Medication 325 MILLIGRAM(S): at 13:47

## 2022-07-15 RX ADMIN — OXYCODONE HYDROCHLORIDE 15 MILLIGRAM(S): 5 TABLET ORAL at 23:08

## 2022-07-15 RX ADMIN — OXYCODONE HYDROCHLORIDE 15 MILLIGRAM(S): 5 TABLET ORAL at 04:43

## 2022-07-15 RX ADMIN — OXYCODONE HYDROCHLORIDE 15 MILLIGRAM(S): 5 TABLET ORAL at 01:17

## 2022-07-15 RX ADMIN — CYCLOBENZAPRINE HYDROCHLORIDE 5 MILLIGRAM(S): 10 TABLET, FILM COATED ORAL at 04:44

## 2022-07-15 RX ADMIN — Medication 650 MILLIGRAM(S): at 04:44

## 2022-07-15 RX ADMIN — OXYCODONE HYDROCHLORIDE 15 MILLIGRAM(S): 5 TABLET ORAL at 15:50

## 2022-07-15 RX ADMIN — OXYCODONE HYDROCHLORIDE 15 MILLIGRAM(S): 5 TABLET ORAL at 00:19

## 2022-07-15 RX ADMIN — OXYCODONE HYDROCHLORIDE 15 MILLIGRAM(S): 5 TABLET ORAL at 20:00

## 2022-07-15 NOTE — PROGRESS NOTE ADULT - NS_MD_PANP_GEN_ALL_CORE
11/10/20 Patient: Olivier Alford  
YOB: 1965 Date of Visit: 11/3/2020 Juan J Thomas, [address] [facility] [address] VIA In Basket Dear Juan J Thomas PA-C, Thank you for referring Ms. Olivier Alford to [address] [place] [address] for evaluation. My notes for this consultation are attached. If you have questions, please do not hesitate to call me. I look forward to following your patient along with you.  
 
 
Sincerely, 
 
Miller Wilde MD 
 
 Attending and PA/NP shared services statement (NON-critical care):

## 2022-07-15 NOTE — PROGRESS NOTE ADULT - ASSESSMENT
61M with PMHx of ETOH abuse, chronic pancreatitis with chronic PD dilation, PD stone and calcifications, perforated gastric ulcer s/p exlap with David patch repair (2019, Dr. Murray), CKD4, hep C s/p SVR and benign esophageal stricture (post-stent 4/19/22 c/b need for CRE balloon dilation, reposition and suture on 4/29) presents with a flare of chronic pancreatitis. Surgical oncology consulted to evaluate for Puestow procedure.    Plan/Recommendations:  - plan for OR next Wednesday   - patient intermediate risk per medicine  - cards: intermediate risk for low-mod risk procedure, no severe AS/MS, no decompensated HF, no ACS, no tachy-jacquelyn arrythmia, no cardiac contraindication to proceed  - continue diet as tolerated  - NPO after midnight sunday, pre-op labs, covid test needed    Lydia Howell, PGY3  D Team Surgery   [id number]   61M with PMHx of ETOH abuse, chronic pancreatitis with chronic PD dilation, PD stone and calcifications, perforated gastric ulcer s/p exlap with David patch repair (2019, Dr. Murray), CKD4, hep C s/p SVR and benign esophageal stricture (post-stent 4/19/22 c/b need for CRE balloon dilation, reposition and suture on 4/29) presents with a flare of chronic pancreatitis. Surgical oncology consulted to evaluate for Puestow procedure.    Plan/Recommendations:  - plan for OR next Wednesday   - patient intermediate risk per medicine  - cards: intermediate risk for low-mod risk procedure, no severe AS/MS, no decompensated HF, no ACS, no tachy-jacquelyn arrythmia, no cardiac contraindication to proceed  - continue diet as tolerated  - H/H downtrending, continue to monitor    Lydia Howell, PGY3  D Team Surgery   [id number]

## 2022-07-15 NOTE — PROGRESS NOTE ADULT - SUBJECTIVE AND OBJECTIVE BOX
Date of Service   07-15-22 @ 11:30    Patient is a 61y old  Male who presents with a chief complaint of abdominal pain (15 Jul 2022 07:59)      INTERVAL HISTORY: pt feels ok, c/o occasional heartburn with food     REVIEW OF SYSTEMS:   CONSTITUTIONAL: No weakness  EYES/ENT: No visual changes; No throat pain  Neck: No pain or stiffness  Respiratory: No cough, wheezing, No shortness of breath  CARDIOVASCULAR: no chest pain or palpitations  GASTROINTESTINAL: No abdominal pain, no nausea, vomiting or hematemesis  GENITOURINARY: No dysuria, frequency or hematuria  NEUROLOGICAL: No stroke like symptoms  SKIN: No rashes    	  MEDICATIONS:        PHYSICAL EXAM:  T(C): 36.7 (07-15-22 @ 04:40), Max: 36.7 (07-14-22 @ 21:15)  HR: 90 (07-15-22 @ 04:40) (73 - 90)  BP: 144/86 (07-15-22 @ 04:40) (127/74 - 144/86)  RR: 18 (07-15-22 @ 04:40) (18 - 18)  SpO2: 100% (07-15-22 @ 04:40) (100% - 100%)  Wt(kg): --  I&O's Summary        Appearance: In no distress	  HEENT:    PERRL, EOMI	  Cardiovascular:  S1 S2, No JVD  Respiratory: Lungs clear to auscultation	  Gastrointestinal:  Soft, Non-tender, + BS	  Vasculature:  No edema of LE  Psychiatric: Appropriate affect   Neuro: no acute focal deficits                               7.7    8.09  )-----------( 193      ( 15 Jul 2022 05:30 )             25.5     07-15    141  |  114<H>  |  39<H>  ----------------------------<  185<H>  5.3   |  19<L>  |  2.39<H>    Ca    8.3<L>      15 Jul 2022 05:30  Phos  2.8     07-15  Mg     1.80     07-15          Labs personally reviewed      ASSESSMENT/PLAN: 	  61M with PMHx of chronic pancreatitis (complicated by chronic pain of which secondary to alcohol abuse), CKD4, hep C s/p SVR and benign esophageal stricture (post-stent 4/19/22 c/b need for CRE balloon dilation, reposition and suture on 4/29) presents with epigastric pain, most likely due to chronic pancreatic duct obstruction. Surgery on board.     Problem/Plan - 1:  ·  Problem: Preop Risk stratification  - intermediate risk for low-mod risk procedure  - no severe AS/MS, no decompensated HF, no ACS, no tachy-jacquelyn arrythmia  - no cardiac contraindication to proceed  - reports chest pain associated with food   - plan for OR next Wednesday for Puestow procedure.      Neva Spaulding [facility]   Talib Davison DO [facility]  Cardiovascular Medicine  [address], [address]  Office: [phone]

## 2022-07-15 NOTE — PROGRESS NOTE ADULT - SUBJECTIVE AND OBJECTIVE BOX
HPI:  61M with PMHx of ETOH abuse, chronic pancreatitis with chronic PD dilation, PD stone and calcifications, perforated gastric ulcer s/p exlap with David patch repair (2019, Dr. Murray), CKD4, hep C s/p SVR and benign esophageal stricture (post-stent 4/19/22 c/b need for CRE balloon dilation, reposition and suture on 4/29) presents with epigastric pain. Patient reports 2 days of abdominal pain. Denies nausea, vomiting. Endorses flatus and BMs.    Surgical oncology consulted to evaluate if patient is a candidate for the Puestow procedure. Patient very somnolent in ED, most of history of obtained from chart review. Patient has an extensive history of chronic pancreatitis with associated pancreatic calcifications, pancreatic ductal dilatation of the body and tail to 1.6cm with an associated obstructing calculi dating as far back as 12/2021. Patient's most recent imaging studies have been done without IV contrast due to CKD, thus limiting evaluation. GI unable to intervene due to patient's esophageal stent.    Subjective: Patient seen and examined. no events overnight. denies abdominal pain worse than usual, N/V.    VITALS:  T(C): 36.7 (07-15-22 @ 04:40), Max: 36.7 (07-14-22 @ 21:15)  HR: 90 (07-15-22 @ 04:40) (73 - 90)  BP: 144/86 (07-15-22 @ 04:40) (127/74 - 144/86)  RR: 18 (07-15-22 @ 04:40) (17 - 18)  SpO2: 100% (07-15-22 @ 04:40) (99% - 100%)  Wt(kg): --      PHYSICAL EXAM:  General: AAOx3, no acute distress.  Respiratory: breathing comfortably, no increased WOB   Abdomen: soft, mild epigastric tenderness, nondistended, no rebound, no guarding  Extremities: Moves all four.     LABS:                            7.7    8.09  )-----------( 193      ( 15 Jul 2022 05:30 )             25.5     07-15    141  |  114<H>  |  39<H>  ----------------------------<  185<H>  5.3   |  19<L>  |  2.39<H>    Ca    8.3<L>      15 Jul 2022 05:30  Phos  2.8     07-15  Mg     1.80     07-15      < from: Transthoracic Echocardiogram (07.14.22 @ 11:15) >  CONCLUSIONS:  1. Mitral annular calcification, otherwise normal mitral  valve. Minimal mitral regurgitation.  2. Calcified trileaflet aortic valve with normal opening.  Mild-moderate aortic regurgitation.  3. Normal left ventricular internal dimensions and wall  thicknesses.  4. Normal left ventricular systolic function. No segmental  wall motion abnormalities.  5. Normal right ventricular size and function.    < end of copied text >

## 2022-07-16 LAB
ANION GAP SERPL CALC-SCNC: 10 MMOL/L — SIGNIFICANT CHANGE UP (ref 7–14)
ANION GAP SERPL CALC-SCNC: 11 MMOL/L — SIGNIFICANT CHANGE UP (ref 7–14)
ANION GAP SERPL CALC-SCNC: 8 MMOL/L — SIGNIFICANT CHANGE UP (ref 7–14)
BUN SERPL-MCNC: 42 MG/DL — HIGH (ref 7–23)
CALCIUM SERPL-MCNC: 8.7 MG/DL — SIGNIFICANT CHANGE UP (ref 8.4–10.5)
CALCIUM SERPL-MCNC: 8.7 MG/DL — SIGNIFICANT CHANGE UP (ref 8.4–10.5)
CALCIUM SERPL-MCNC: 8.8 MG/DL — SIGNIFICANT CHANGE UP (ref 8.4–10.5)
CHLORIDE SERPL-SCNC: 113 MMOL/L — HIGH (ref 98–107)
CO2 SERPL-SCNC: 16 MMOL/L — LOW (ref 22–31)
CO2 SERPL-SCNC: 16 MMOL/L — LOW (ref 22–31)
CO2 SERPL-SCNC: 19 MMOL/L — LOW (ref 22–31)
CREAT SERPL-MCNC: 2.19 MG/DL — HIGH (ref 0.5–1.3)
CREAT SERPL-MCNC: 2.19 MG/DL — HIGH (ref 0.5–1.3)
CREAT SERPL-MCNC: 2.42 MG/DL — HIGH (ref 0.5–1.3)
EGFR: 29 ML/MIN/1.73M2 — LOW
EGFR: 33 ML/MIN/1.73M2 — LOW
EGFR: 33 ML/MIN/1.73M2 — LOW
GLUCOSE SERPL-MCNC: 104 MG/DL — HIGH (ref 70–99)
GLUCOSE SERPL-MCNC: 84 MG/DL — SIGNIFICANT CHANGE UP (ref 70–99)
GLUCOSE SERPL-MCNC: 86 MG/DL — SIGNIFICANT CHANGE UP (ref 70–99)
HCT VFR BLD CALC: 25.9 % — LOW (ref 39–50)
HGB BLD-MCNC: 7.8 G/DL — LOW (ref 13–17)
MAGNESIUM SERPL-MCNC: 1.7 MG/DL — SIGNIFICANT CHANGE UP (ref 1.6–2.6)
MAGNESIUM SERPL-MCNC: 2 MG/DL — SIGNIFICANT CHANGE UP (ref 1.6–2.6)
MCHC RBC-ENTMCNC: 30.1 GM/DL — LOW (ref 32–36)
MCHC RBC-ENTMCNC: 31.3 PG — SIGNIFICANT CHANGE UP (ref 27–34)
MCV RBC AUTO: 104 FL — HIGH (ref 80–100)
NRBC # BLD: 0 /100 WBCS — SIGNIFICANT CHANGE UP
NRBC # FLD: 0 K/UL — SIGNIFICANT CHANGE UP
PHOSPHATE SERPL-MCNC: 2.7 MG/DL — SIGNIFICANT CHANGE UP (ref 2.5–4.5)
PHOSPHATE SERPL-MCNC: 3.3 MG/DL — SIGNIFICANT CHANGE UP (ref 2.5–4.5)
PLATELET # BLD AUTO: 229 K/UL — SIGNIFICANT CHANGE UP (ref 150–400)
POTASSIUM SERPL-MCNC: 5.2 MMOL/L — SIGNIFICANT CHANGE UP (ref 3.5–5.3)
POTASSIUM SERPL-MCNC: 5.8 MMOL/L — HIGH (ref 3.5–5.3)
POTASSIUM SERPL-MCNC: 6.7 MMOL/L — CRITICAL HIGH (ref 3.5–5.3)
POTASSIUM SERPL-SCNC: 5.2 MMOL/L — SIGNIFICANT CHANGE UP (ref 3.5–5.3)
POTASSIUM SERPL-SCNC: 5.8 MMOL/L — HIGH (ref 3.5–5.3)
POTASSIUM SERPL-SCNC: 6.7 MMOL/L — CRITICAL HIGH (ref 3.5–5.3)
RBC # BLD: 2.49 M/UL — LOW (ref 4.2–5.8)
RBC # FLD: 15.9 % — HIGH (ref 10.3–14.5)
SODIUM SERPL-SCNC: 139 MMOL/L — SIGNIFICANT CHANGE UP (ref 135–145)
SODIUM SERPL-SCNC: 140 MMOL/L — SIGNIFICANT CHANGE UP (ref 135–145)
SODIUM SERPL-SCNC: 140 MMOL/L — SIGNIFICANT CHANGE UP (ref 135–145)
WBC # BLD: 7.56 K/UL — SIGNIFICANT CHANGE UP (ref 3.8–10.5)
WBC # FLD AUTO: 7.56 K/UL — SIGNIFICANT CHANGE UP (ref 3.8–10.5)

## 2022-07-16 PROCEDURE — 93010 ELECTROCARDIOGRAM REPORT: CPT

## 2022-07-16 RX ORDER — SODIUM ZIRCONIUM CYCLOSILICATE 10 G/10G
10 POWDER, FOR SUSPENSION ORAL ONCE
Refills: 0 | Status: COMPLETED | OUTPATIENT
Start: 2022-07-16 | End: 2022-07-16

## 2022-07-16 RX ORDER — MAGNESIUM SULFATE 500 MG/ML
2 VIAL (ML) INJECTION ONCE
Refills: 0 | Status: COMPLETED | OUTPATIENT
Start: 2022-07-16 | End: 2022-07-16

## 2022-07-16 RX ADMIN — SODIUM ZIRCONIUM CYCLOSILICATE 10 GRAM(S): 10 POWDER, FOR SUSPENSION ORAL at 23:20

## 2022-07-16 RX ADMIN — OXYCODONE HYDROCHLORIDE 15 MILLIGRAM(S): 5 TABLET ORAL at 11:39

## 2022-07-16 RX ADMIN — OXYCODONE HYDROCHLORIDE 15 MILLIGRAM(S): 5 TABLET ORAL at 20:24

## 2022-07-16 RX ADMIN — Medication 25 GRAM(S): at 11:42

## 2022-07-16 RX ADMIN — Medication 650 MILLIGRAM(S): at 05:07

## 2022-07-16 RX ADMIN — Medication 63.75 MILLIMOLE(S): at 11:39

## 2022-07-16 RX ADMIN — OXYCODONE HYDROCHLORIDE 15 MILLIGRAM(S): 5 TABLET ORAL at 21:20

## 2022-07-16 RX ADMIN — OXYCODONE HYDROCHLORIDE 15 MILLIGRAM(S): 5 TABLET ORAL at 05:06

## 2022-07-16 RX ADMIN — PANTOPRAZOLE SODIUM 40 MILLIGRAM(S): 20 TABLET, DELAYED RELEASE ORAL at 05:07

## 2022-07-16 RX ADMIN — Medication 325 MILLIGRAM(S): at 10:14

## 2022-07-16 RX ADMIN — Medication 650 MILLIGRAM(S): at 17:25

## 2022-07-16 RX ADMIN — OXYCODONE HYDROCHLORIDE 15 MILLIGRAM(S): 5 TABLET ORAL at 10:14

## 2022-07-16 RX ADMIN — OXYCODONE HYDROCHLORIDE 15 MILLIGRAM(S): 5 TABLET ORAL at 00:23

## 2022-07-16 RX ADMIN — CYCLOBENZAPRINE HYDROCHLORIDE 5 MILLIGRAM(S): 10 TABLET, FILM COATED ORAL at 05:06

## 2022-07-16 RX ADMIN — OXYCODONE HYDROCHLORIDE 15 MILLIGRAM(S): 5 TABLET ORAL at 10:37

## 2022-07-16 RX ADMIN — Medication 500 MILLIGRAM(S): at 10:14

## 2022-07-16 RX ADMIN — OXYCODONE HYDROCHLORIDE 15 MILLIGRAM(S): 5 TABLET ORAL at 23:50

## 2022-07-16 RX ADMIN — HEPARIN SODIUM 5000 UNIT(S): 5000 INJECTION INTRAVENOUS; SUBCUTANEOUS at 17:24

## 2022-07-16 RX ADMIN — HEPARIN SODIUM 5000 UNIT(S): 5000 INJECTION INTRAVENOUS; SUBCUTANEOUS at 05:06

## 2022-07-16 RX ADMIN — OXYCODONE HYDROCHLORIDE 15 MILLIGRAM(S): 5 TABLET ORAL at 22:56

## 2022-07-16 RX ADMIN — CYCLOBENZAPRINE HYDROCHLORIDE 5 MILLIGRAM(S): 10 TABLET, FILM COATED ORAL at 22:57

## 2022-07-16 RX ADMIN — POLYETHYLENE GLYCOL 3350 17 GRAM(S): 17 POWDER, FOR SOLUTION ORAL at 10:15

## 2022-07-16 RX ADMIN — OXYCODONE HYDROCHLORIDE 15 MILLIGRAM(S): 5 TABLET ORAL at 00:31

## 2022-07-16 RX ADMIN — OXYCODONE HYDROCHLORIDE 15 MILLIGRAM(S): 5 TABLET ORAL at 01:32

## 2022-07-16 RX ADMIN — CYCLOBENZAPRINE HYDROCHLORIDE 5 MILLIGRAM(S): 10 TABLET, FILM COATED ORAL at 14:35

## 2022-07-16 RX ADMIN — Medication 30 MILLILITER(S): at 10:20

## 2022-07-16 RX ADMIN — OXYCODONE HYDROCHLORIDE 15 MILLIGRAM(S): 5 TABLET ORAL at 12:10

## 2022-07-16 RX ADMIN — OXYCODONE HYDROCHLORIDE 15 MILLIGRAM(S): 5 TABLET ORAL at 15:45

## 2022-07-16 NOTE — PROVIDER CONTACT NOTE (MEDICATION) - RECOMMENDATIONS
Take BP and oxygen saturation of patient, and administer additional PRN oxycodone 15mg if B/P & oxygen saturation are not low (bp <120/80 & o2 saturation <95)

## 2022-07-16 NOTE — PROGRESS NOTE ADULT - ASSESSMENT
61M with PMHx of ETOH abuse, chronic pancreatitis with chronic PD dilation, PD stone and calcifications, perforated gastric ulcer s/p exlap with David patch repair (2019, Dr. Murray), CKD4, hep C s/p SVR and benign esophageal stricture (post-stent 4/19/22 c/b need for CRE balloon dilation, reposition and suture on 4/29) presents with a flare of chronic pancreatitis. Surgical oncology consulted to evaluate for Puestow procedure.    Plan/Recommendations:  - plan for OR next Wednesday   - patient intermediate risk per medicine  - cards: intermediate risk for low-mod risk procedure, no severe AS/MS, no decompensated HF, no ACS, no tachy-jacquelyn arrythmia, no cardiac contraindication to proceed  - continue diet as tolerated  - H/H downtrending, continue to monitor  - Will add ensure enlive TID  - Will reassess patients patient medication requirement      D team Surgery Pager: [phone]    Patient seen and examined with Dr. Hoang.

## 2022-07-16 NOTE — PROGRESS NOTE ADULT - ASSESSMENT
61M with PMHx of ETOH abuse, chronic pancreatitis with chronic PD dilation, PD stone and calcifications, perforated gastric ulcer s/p exlap with David patch repair (2019, Dr. Murray), CKD4, hep C s/p SVR and benign esophageal stricture (post-stent 4/19/22 c/b need for CRE balloon dilation, reposition and suture on 4/29) presents with a flare of chronic pancreatitis. Surgical oncology consulted to evaluate for Puestow procedure.    Plan/Recommendations:  - plan for OR next Wednesday   - patient intermediate risk per medicine  - cards: intermediate risk for low-mod risk procedure, no severe AS/MS, no decompensated HF, no ACS, no tachy-jacquelyn arrythmia, no cardiac contraindication to proceed  - continue diet as tolerated  - H/H downtrending, continue to monitor      D team Surgery Pager: [phone]

## 2022-07-16 NOTE — PROVIDER CONTACT NOTE (MEDICATION) - SITUATION
Patient has chronic pain and complains of pain 10/10.  Standing 15mg Oxycontin was given at 11:08pm. at 12:00am pt complained of pain 10/10. Provider Julien was notified.

## 2022-07-16 NOTE — PROGRESS NOTE ADULT - SUBJECTIVE AND OBJECTIVE BOX
24hr events:  -     Overnight events:   - No acute events    SUBJECTIVE:  Patient was seen and examined bedside on AM rounds/    OBJECTIVE:  Vital Signs Last 24 Hrs  T(C): 36.4 (16 Jul 2022 00:32), Max: 37.1 (16 Jul 2022 00:15)  T(F): 97.6 (16 Jul 2022 00:32), Max: 98.7 (16 Jul 2022 00:15)  HR: 103 (16 Jul 2022 00:32) (80 - 103)  BP: 143/90 (16 Jul 2022 00:32) (131/69 - 166/82)  BP(mean): --  RR: 18 (16 Jul 2022 00:32) (17 - 18)  SpO2: 98% (16 Jul 2022 00:32) (98% - 100%)    Parameters below as of 16 Jul 2022 00:32  Patient On (Oxygen Delivery Method): room air          07-15-22 @ 07:01  -  07-16-22 @ 02:11  --------------------------------------------------------  IN: 0 mL / OUT: 350 mL / NET: -350 mL        Physical Examination:  General: AAOx3, no acute distress.  Respiratory: breathing comfortably, no increased WOB   Abdomen: soft, mild epigastric tenderness, nondistended, no rebound, no guarding  Extremities: Moves all four.       LABS:                        7.7    8.09  )-----------( 193      ( 15 Jul 2022 05:30 )             25.5       07-15    141  |  114<H>  |  39<H>  ----------------------------<  185<H>  5.3   |  19<L>  |  2.39<H>    Ca    8.3<L>      15 Jul 2022 05:30  Phos  2.8     07-15  Mg     1.80     07-15

## 2022-07-16 NOTE — PROGRESS NOTE ADULT - SUBJECTIVE AND OBJECTIVE BOX
SUBJECTIVE: Patient seen and examined at bedside on AM rounds. Patient reports that they're feeling well. Patient is tolerated a regular diet denies nausea, vomiting. Patient continues to have midepigastric pain, exacerbated by deep breaths. OOB/Amublating as tolerated. Denies fever, chills.  --------------------------------------------------------------------------------------    VITAL SIGNS:  Vital Signs Last 24 Hrs  T(C): 36.7 (16 Jul 2022 08:21), Max: 37.1 (16 Jul 2022 00:15)  T(F): 98 (16 Jul 2022 08:21), Max: 98.7 (16 Jul 2022 00:15)  HR: 82 (16 Jul 2022 08:21) (80 - 103)  BP: 135/70 (16 Jul 2022 08:21) (131/69 - 166/82)  BP(mean): --  RR: 16 (16 Jul 2022 08:21) (16 - 18)  SpO2: 100% (16 Jul 2022 08:21) (98% - 100%)    Parameters below as of 16 Jul 2022 08:21  Patient On (Oxygen Delivery Method): room air  --------------------------------------------------------------------------------------    EXAM    General: NAD, resting in bed comfortably.  Cardiac: regular rate, warm and well perfused  Respiratory: Nonlabored respirations, normal cw expansion.  Abdomen: soft, tender, nondistended  Extremities: normal strength, FROM, no deformities  --------------------------------------------------------------------------------------    LABS                        7.7    8.09  )-----------( 193      ( 15 Jul 2022 05:30 )             25.5     07-16    139  |  113<H>  |  42<H>  ----------------------------<  104<H>  5.8<H>   |  16<L>  |  2.42<H>    Ca    8.7      16 Jul 2022 06:10  Phos  2.7     07-16  Mg     1.70     07-16  -------------------------------------------------------------------------------------    INS AND OUTS:  I&O's Detail    15 Jul 2022 07:01  -  16 Jul 2022 07:00  --------------------------------------------------------  IN:    Oral Fluid: 200 mL  Total IN: 200 mL    OUT:    Voided (mL): 750 mL  Total OUT: 750 mL    Total NET: -550 mL  --------------------------------------------------------------------------------------

## 2022-07-17 LAB
ALBUMIN SERPL ELPH-MCNC: 3 G/DL — LOW (ref 3.3–5)
ALP SERPL-CCNC: 86 U/L — SIGNIFICANT CHANGE UP (ref 40–120)
ALT FLD-CCNC: 6 U/L — SIGNIFICANT CHANGE UP (ref 4–41)
ANION GAP SERPL CALC-SCNC: 11 MMOL/L — SIGNIFICANT CHANGE UP (ref 7–14)
ANION GAP SERPL CALC-SCNC: 8 MMOL/L — SIGNIFICANT CHANGE UP (ref 7–14)
AST SERPL-CCNC: 10 U/L — SIGNIFICANT CHANGE UP (ref 4–40)
BILIRUB SERPL-MCNC: <0.2 MG/DL — SIGNIFICANT CHANGE UP (ref 0.2–1.2)
BUN SERPL-MCNC: 42 MG/DL — HIGH (ref 7–23)
BUN SERPL-MCNC: 46 MG/DL — HIGH (ref 7–23)
CALCIUM SERPL-MCNC: 8.5 MG/DL — SIGNIFICANT CHANGE UP (ref 8.4–10.5)
CALCIUM SERPL-MCNC: 8.6 MG/DL — SIGNIFICANT CHANGE UP (ref 8.4–10.5)
CHLORIDE SERPL-SCNC: 111 MMOL/L — HIGH (ref 98–107)
CHLORIDE SERPL-SCNC: 112 MMOL/L — HIGH (ref 98–107)
CO2 SERPL-SCNC: 16 MMOL/L — LOW (ref 22–31)
CO2 SERPL-SCNC: 17 MMOL/L — LOW (ref 22–31)
CREAT SERPL-MCNC: 2.47 MG/DL — HIGH (ref 0.5–1.3)
CREAT SERPL-MCNC: 2.64 MG/DL — HIGH (ref 0.5–1.3)
EGFR: 27 ML/MIN/1.73M2 — LOW
EGFR: 29 ML/MIN/1.73M2 — LOW
GLUCOSE BLDC GLUCOMTR-MCNC: 101 MG/DL — HIGH (ref 70–99)
GLUCOSE BLDC GLUCOMTR-MCNC: 108 MG/DL — HIGH (ref 70–99)
GLUCOSE BLDC GLUCOMTR-MCNC: 91 MG/DL — SIGNIFICANT CHANGE UP (ref 70–99)
GLUCOSE SERPL-MCNC: 83 MG/DL — SIGNIFICANT CHANGE UP (ref 70–99)
GLUCOSE SERPL-MCNC: 96 MG/DL — SIGNIFICANT CHANGE UP (ref 70–99)
HCT VFR BLD CALC: 29.3 % — LOW (ref 39–50)
HGB BLD-MCNC: 8.3 G/DL — LOW (ref 13–17)
MAGNESIUM SERPL-MCNC: 1.8 MG/DL — SIGNIFICANT CHANGE UP (ref 1.6–2.6)
MCHC RBC-ENTMCNC: 28.3 GM/DL — LOW (ref 32–36)
MCHC RBC-ENTMCNC: 31.8 PG — SIGNIFICANT CHANGE UP (ref 27–34)
MCV RBC AUTO: 112.3 FL — HIGH (ref 80–100)
NRBC # BLD: 0 /100 WBCS — SIGNIFICANT CHANGE UP
NRBC # FLD: 0 K/UL — SIGNIFICANT CHANGE UP
PHOSPHATE SERPL-MCNC: 2.7 MG/DL — SIGNIFICANT CHANGE UP (ref 2.5–4.5)
PLATELET # BLD AUTO: 235 K/UL — SIGNIFICANT CHANGE UP (ref 150–400)
POTASSIUM SERPL-MCNC: 5.7 MMOL/L — HIGH (ref 3.5–5.3)
POTASSIUM SERPL-MCNC: 6 MMOL/L — HIGH (ref 3.5–5.3)
POTASSIUM SERPL-SCNC: 5.7 MMOL/L — HIGH (ref 3.5–5.3)
POTASSIUM SERPL-SCNC: 6 MMOL/L — HIGH (ref 3.5–5.3)
PROT SERPL-MCNC: 5.6 G/DL — LOW (ref 6–8.3)
RBC # BLD: 2.61 M/UL — LOW (ref 4.2–5.8)
RBC # FLD: 16.6 % — HIGH (ref 10.3–14.5)
SODIUM SERPL-SCNC: 136 MMOL/L — SIGNIFICANT CHANGE UP (ref 135–145)
SODIUM SERPL-SCNC: 139 MMOL/L — SIGNIFICANT CHANGE UP (ref 135–145)
WBC # BLD: 7.79 K/UL — SIGNIFICANT CHANGE UP (ref 3.8–10.5)
WBC # FLD AUTO: 7.79 K/UL — SIGNIFICANT CHANGE UP (ref 3.8–10.5)

## 2022-07-17 PROCEDURE — 93971 EXTREMITY STUDY: CPT | Mod: 26

## 2022-07-17 RX ORDER — SODIUM,POTASSIUM PHOSPHATES 278-250MG
1 POWDER IN PACKET (EA) ORAL
Refills: 0 | Status: DISCONTINUED | OUTPATIENT
Start: 2022-07-17 | End: 2022-07-17

## 2022-07-17 RX ORDER — DEXTROSE 50 % IN WATER 50 %
50 SYRINGE (ML) INTRAVENOUS ONCE
Refills: 0 | Status: COMPLETED | OUTPATIENT
Start: 2022-07-17 | End: 2022-07-17

## 2022-07-17 RX ORDER — SODIUM ZIRCONIUM CYCLOSILICATE 10 G/10G
5 POWDER, FOR SUSPENSION ORAL ONCE
Refills: 0 | Status: COMPLETED | OUTPATIENT
Start: 2022-07-17 | End: 2022-07-17

## 2022-07-17 RX ORDER — INSULIN HUMAN 100 [IU]/ML
5 INJECTION, SOLUTION SUBCUTANEOUS ONCE
Refills: 0 | Status: COMPLETED | OUTPATIENT
Start: 2022-07-17 | End: 2022-07-17

## 2022-07-17 RX ORDER — SODIUM BICARBONATE 1 MEQ/ML
50 SYRINGE (ML) INTRAVENOUS ONCE
Refills: 0 | Status: COMPLETED | OUTPATIENT
Start: 2022-07-17 | End: 2022-07-17

## 2022-07-17 RX ORDER — OXYCODONE HYDROCHLORIDE 5 MG/1
15 TABLET ORAL EVERY 4 HOURS
Refills: 0 | Status: DISCONTINUED | OUTPATIENT
Start: 2022-07-17 | End: 2022-07-20

## 2022-07-17 RX ORDER — INSULIN HUMAN 100 [IU]/ML
10 INJECTION, SOLUTION SUBCUTANEOUS ONCE
Refills: 0 | Status: DISCONTINUED | OUTPATIENT
Start: 2022-07-17 | End: 2022-07-17

## 2022-07-17 RX ORDER — DEXTROSE 50 % IN WATER 50 %
50 SYRINGE (ML) INTRAVENOUS ONCE
Refills: 0 | Status: COMPLETED | OUTPATIENT
Start: 2022-07-17 | End: 2022-07-18

## 2022-07-17 RX ORDER — ACETAMINOPHEN 500 MG
1000 TABLET ORAL EVERY 8 HOURS
Refills: 0 | Status: COMPLETED | OUTPATIENT
Start: 2022-07-17 | End: 2022-07-18

## 2022-07-17 RX ORDER — INSULIN HUMAN 100 [IU]/ML
5 INJECTION, SOLUTION SUBCUTANEOUS ONCE
Refills: 0 | Status: COMPLETED | OUTPATIENT
Start: 2022-07-17 | End: 2022-07-18

## 2022-07-17 RX ORDER — MAGNESIUM SULFATE 500 MG/ML
2 VIAL (ML) INJECTION ONCE
Refills: 0 | Status: COMPLETED | OUTPATIENT
Start: 2022-07-17 | End: 2022-07-18

## 2022-07-17 RX ADMIN — SENNA PLUS 1 TABLET(S): 8.6 TABLET ORAL at 21:23

## 2022-07-17 RX ADMIN — Medication 650 MILLIGRAM(S): at 05:51

## 2022-07-17 RX ADMIN — OXYCODONE HYDROCHLORIDE 15 MILLIGRAM(S): 5 TABLET ORAL at 12:30

## 2022-07-17 RX ADMIN — CYCLOBENZAPRINE HYDROCHLORIDE 5 MILLIGRAM(S): 10 TABLET, FILM COATED ORAL at 21:23

## 2022-07-17 RX ADMIN — INSULIN HUMAN 5 UNIT(S): 100 INJECTION, SOLUTION SUBCUTANEOUS at 16:54

## 2022-07-17 RX ADMIN — Medication 400 MILLIGRAM(S): at 22:37

## 2022-07-17 RX ADMIN — Medication 650 MILLIGRAM(S): at 17:01

## 2022-07-17 RX ADMIN — OXYCODONE HYDROCHLORIDE 15 MILLIGRAM(S): 5 TABLET ORAL at 21:21

## 2022-07-17 RX ADMIN — Medication 325 MILLIGRAM(S): at 11:50

## 2022-07-17 RX ADMIN — OXYCODONE HYDROCHLORIDE 15 MILLIGRAM(S): 5 TABLET ORAL at 16:31

## 2022-07-17 RX ADMIN — PANTOPRAZOLE SODIUM 40 MILLIGRAM(S): 20 TABLET, DELAYED RELEASE ORAL at 05:52

## 2022-07-17 RX ADMIN — OXYCODONE HYDROCHLORIDE 15 MILLIGRAM(S): 5 TABLET ORAL at 22:37

## 2022-07-17 RX ADMIN — OXYCODONE HYDROCHLORIDE 15 MILLIGRAM(S): 5 TABLET ORAL at 11:49

## 2022-07-17 RX ADMIN — OXYCODONE HYDROCHLORIDE 15 MILLIGRAM(S): 5 TABLET ORAL at 06:27

## 2022-07-17 RX ADMIN — Medication 50 MILLIEQUIVALENT(S): at 23:26

## 2022-07-17 RX ADMIN — Medication 500 MILLIGRAM(S): at 11:50

## 2022-07-17 RX ADMIN — OXYCODONE HYDROCHLORIDE 15 MILLIGRAM(S): 5 TABLET ORAL at 17:30

## 2022-07-17 RX ADMIN — OXYCODONE HYDROCHLORIDE 15 MILLIGRAM(S): 5 TABLET ORAL at 23:30

## 2022-07-17 RX ADMIN — OXYCODONE HYDROCHLORIDE 15 MILLIGRAM(S): 5 TABLET ORAL at 22:21

## 2022-07-17 RX ADMIN — OXYCODONE HYDROCHLORIDE 15 MILLIGRAM(S): 5 TABLET ORAL at 05:51

## 2022-07-17 RX ADMIN — HEPARIN SODIUM 5000 UNIT(S): 5000 INJECTION INTRAVENOUS; SUBCUTANEOUS at 05:52

## 2022-07-17 RX ADMIN — OXYCODONE HYDROCHLORIDE 15 MILLIGRAM(S): 5 TABLET ORAL at 09:53

## 2022-07-17 RX ADMIN — SODIUM ZIRCONIUM CYCLOSILICATE 5 GRAM(S): 10 POWDER, FOR SUSPENSION ORAL at 23:22

## 2022-07-17 RX ADMIN — Medication 50 MILLILITER(S): at 16:30

## 2022-07-17 RX ADMIN — CYCLOBENZAPRINE HYDROCHLORIDE 5 MILLIGRAM(S): 10 TABLET, FILM COATED ORAL at 05:51

## 2022-07-17 RX ADMIN — CYCLOBENZAPRINE HYDROCHLORIDE 5 MILLIGRAM(S): 10 TABLET, FILM COATED ORAL at 14:51

## 2022-07-17 RX ADMIN — HEPARIN SODIUM 5000 UNIT(S): 5000 INJECTION INTRAVENOUS; SUBCUTANEOUS at 17:01

## 2022-07-17 NOTE — PROGRESS NOTE ADULT - ASSESSMENT
61M with PMHx of ETOH abuse, chronic pancreatitis with chronic PD dilation, PD stone and calcifications, perforated gastric ulcer s/p exlap with David patch repair (2019, Dr. Murray), CKD4, hep C s/p SVR and benign esophageal stricture (post-stent 4/19/22 c/b need for CRE balloon dilation, reposition and suture on 4/29) presents with a flare of chronic pancreatitis. Surgical oncology consulted to evaluate for Puestow procedure.    Plan/Recommendations:  -Hyperkalemic overnight -- will f/u AM labs and shift as necessary  -O&P for eval of stool parasites  -Will reach out to outpt Nephro for recs re: CKD/hyperkalemia  - plan for OR next Wednesday   - patient intermediate risk per medicine  - cards: intermediate risk for low-mod risk procedure, no severe AS/MS, no decompensated HF, no ACS, no tachy-jacquelyn arrythmia, no cardiac contraindication to proceed  - continue diet as tolerated  - H/H downtrending, continue to monitor  - Will add ensure enlive TID  - Will reassess patients patient medication requirement      D team Surgery Pager: [phone]    Patient seen and examined with Dr. Hoang.

## 2022-07-17 NOTE — PROGRESS NOTE ADULT - SUBJECTIVE AND OBJECTIVE BOX
TEAM [ D ] Surgery Daily Progress Note  =====================================================    SUBJECTIVE: Patient seen and examined at bedside on AM rounds. Patient reports that they're feeling well. Tolerating diet, denies nausea, vomiting. OOB/Amublating as tolerated. Denies fever, chills. Patient hyperkalemic on labs, EKG with mild peaked T waves, morning labs reordered. Patient also reports "worms" in his stool to nursing.      ALLERGIES:  No Known Allergies      --------------------------------------------------------------------------------------    MEDICATIONS:    Neurologic Medications  acetaminophen   IVPB .. 1000 milliGRAM(s) IV Intermittent every 8 hours  cyclobenzaprine 5 milliGRAM(s) Oral three times a day  melatonin 3 milliGRAM(s) Oral at bedtime PRN Insomnia  ondansetron Injectable 4 milliGRAM(s) IV Push every 8 hours PRN Nausea and/or Vomiting  oxyCODONE    IR 15 milliGRAM(s) Oral every 4 hours PRN Severe Pain (7 - 10)  oxyCODONE  ER Tablet 15 milliGRAM(s) Oral every 12 hours    Respiratory Medications    Cardiovascular Medications    Gastrointestinal Medications  aluminum hydroxide/magnesium hydroxide/simethicone Suspension 30 milliLiter(s) Oral every 4 hours PRN Dyspepsia  ascorbic acid 500 milliGRAM(s) Oral daily  ferrous    sulfate 325 milliGRAM(s) Oral daily  magnesium sulfate  IVPB 2 Gram(s) IV Intermittent once  pantoprazole    Tablet 40 milliGRAM(s) Oral before breakfast  polyethylene glycol 3350 17 Gram(s) Oral daily  senna 1 Tablet(s) Oral at bedtime  sodium bicarbonate 650 milliGRAM(s) Oral two times a day    Genitourinary Medications    Hematologic/Oncologic Medications  heparin   Injectable 5000 Unit(s) SubCutaneous every 12 hours    Antimicrobial/Immunologic Medications    Endocrine/Metabolic Medications    Topical/Other Medications    --------------------------------------------------------------------------------------    VITAL SIGNS:  ICU Vital Signs Last 24 Hrs  T(C): 36.4 (17 Jul 2022 17:16), Max: 37.1 (17 Jul 2022 09:32)  T(F): 97.6 (17 Jul 2022 17:16), Max: 98.8 (17 Jul 2022 09:32)  HR: 87 (17 Jul 2022 17:16) (81 - 91)  BP: 154/75 (17 Jul 2022 17:16) (133/66 - 154/75)  BP(mean): --  ABP: --  ABP(mean): --  RR: 19 (17 Jul 2022 17:16) (18 - 19)  SpO2: 100% (17 Jul 2022 17:16) (99% - 100%)    O2 Parameters below as of 17 Jul 2022 17:16  Patient On (Oxygen Delivery Method): room air    --------------------------------------------------------------------------------------    EXAM    General: NAD, resting in bed comfortably.  Cardiac: regular rate, warm and well perfused  Respiratory: Nonlabored respirations, normal cw expansion.  Abdomen: soft, nontender, nondistended.   Extremities: normal strength, FROM, no deformities    --------------------------------------------------------------------------------------    LABS                        8.3    7.79  )-----------( 235      ( 17 Jul 2022 12:34 )             29.3   07-17    136  |  111<H>  |  42<H>  ----------------------------<  96  6.0<H>   |  17<L>  |  2.64<H>    Ca    8.5      17 Jul 2022 06:24  Phos  2.7     07-17  Mg     1.80     07-17    TPro  5.6<L>  /  Alb  3.0<L>  /  TBili  <0.2  /  DBili  x   /  AST  10  /  ALT  6   /  AlkPhos  86  07-17      --------------------------------------------------------------------------------------    INS AND OUTS:    I&O's Detail    16 Jul 2022 07:01  -  17 Jul 2022 07:00  --------------------------------------------------------  IN:    Oral Fluid: 420 mL  Total IN: 420 mL    OUT:    Voided (mL): 1950 mL  Total OUT: 1950 mL    Total NET: -1530 mL      17 Jul 2022 07:01  -  17 Jul 2022 19:33  --------------------------------------------------------  IN:    Oral Fluid: 440 mL  Total IN: 440 mL    OUT:    Voided (mL): 500 mL  Total OUT: 500 mL    Total NET: -60 mL    --------------------------------------------------------------------------------------

## 2022-07-18 DIAGNOSIS — N18.9 CHRONIC KIDNEY DISEASE, UNSPECIFIED: ICD-10-CM

## 2022-07-18 DIAGNOSIS — E87.5 HYPERKALEMIA: ICD-10-CM

## 2022-07-18 LAB
ANION GAP SERPL CALC-SCNC: 8 MMOL/L — SIGNIFICANT CHANGE UP (ref 7–14)
ANION GAP SERPL CALC-SCNC: 9 MMOL/L — SIGNIFICANT CHANGE UP (ref 7–14)
ANION GAP SERPL CALC-SCNC: 9 MMOL/L — SIGNIFICANT CHANGE UP (ref 7–14)
BUN SERPL-MCNC: 45 MG/DL — HIGH (ref 7–23)
BUN SERPL-MCNC: 46 MG/DL — HIGH (ref 7–23)
BUN SERPL-MCNC: 46 MG/DL — HIGH (ref 7–23)
CALCIUM SERPL-MCNC: 8.6 MG/DL — SIGNIFICANT CHANGE UP (ref 8.4–10.5)
CALCIUM SERPL-MCNC: 8.6 MG/DL — SIGNIFICANT CHANGE UP (ref 8.4–10.5)
CALCIUM SERPL-MCNC: 8.8 MG/DL — SIGNIFICANT CHANGE UP (ref 8.4–10.5)
CHLORIDE SERPL-SCNC: 105 MMOL/L — SIGNIFICANT CHANGE UP (ref 98–107)
CHLORIDE SERPL-SCNC: 110 MMOL/L — HIGH (ref 98–107)
CHLORIDE SERPL-SCNC: 111 MMOL/L — HIGH (ref 98–107)
CO2 SERPL-SCNC: 20 MMOL/L — LOW (ref 22–31)
CO2 SERPL-SCNC: 20 MMOL/L — LOW (ref 22–31)
CO2 SERPL-SCNC: 22 MMOL/L — SIGNIFICANT CHANGE UP (ref 22–31)
CREAT SERPL-MCNC: 2.36 MG/DL — HIGH (ref 0.5–1.3)
CREAT SERPL-MCNC: 2.42 MG/DL — HIGH (ref 0.5–1.3)
CREAT SERPL-MCNC: 2.5 MG/DL — HIGH (ref 0.5–1.3)
EGFR: 28 ML/MIN/1.73M2 — LOW
EGFR: 29 ML/MIN/1.73M2 — LOW
EGFR: 30 ML/MIN/1.73M2 — LOW
GLUCOSE BLDC GLUCOMTR-MCNC: 130 MG/DL — HIGH (ref 70–99)
GLUCOSE BLDC GLUCOMTR-MCNC: 158 MG/DL — HIGH (ref 70–99)
GLUCOSE BLDC GLUCOMTR-MCNC: 233 MG/DL — HIGH (ref 70–99)
GLUCOSE SERPL-MCNC: 119 MG/DL — HIGH (ref 70–99)
GLUCOSE SERPL-MCNC: 86 MG/DL — SIGNIFICANT CHANGE UP (ref 70–99)
GLUCOSE SERPL-MCNC: 97 MG/DL — SIGNIFICANT CHANGE UP (ref 70–99)
HCT VFR BLD CALC: 27.9 % — LOW (ref 39–50)
HGB BLD-MCNC: 8.5 G/DL — LOW (ref 13–17)
MAGNESIUM SERPL-MCNC: 2 MG/DL — SIGNIFICANT CHANGE UP (ref 1.6–2.6)
MAGNESIUM SERPL-MCNC: 2 MG/DL — SIGNIFICANT CHANGE UP (ref 1.6–2.6)
MAGNESIUM SERPL-MCNC: 2.3 MG/DL — SIGNIFICANT CHANGE UP (ref 1.6–2.6)
MCHC RBC-ENTMCNC: 30.5 GM/DL — LOW (ref 32–36)
MCHC RBC-ENTMCNC: 31.8 PG — SIGNIFICANT CHANGE UP (ref 27–34)
MCV RBC AUTO: 104.5 FL — HIGH (ref 80–100)
NRBC # BLD: 0 /100 WBCS — SIGNIFICANT CHANGE UP
NRBC # FLD: 0 K/UL — SIGNIFICANT CHANGE UP
PHOSPHATE SERPL-MCNC: 2.9 MG/DL — SIGNIFICANT CHANGE UP (ref 2.5–4.5)
PHOSPHATE SERPL-MCNC: 3 MG/DL — SIGNIFICANT CHANGE UP (ref 2.5–4.5)
PHOSPHATE SERPL-MCNC: 3.1 MG/DL — SIGNIFICANT CHANGE UP (ref 2.5–4.5)
PLATELET # BLD AUTO: 231 K/UL — SIGNIFICANT CHANGE UP (ref 150–400)
POTASSIUM SERPL-MCNC: 5.5 MMOL/L — HIGH (ref 3.5–5.3)
POTASSIUM SERPL-MCNC: 5.6 MMOL/L — HIGH (ref 3.5–5.3)
POTASSIUM SERPL-MCNC: 6.2 MMOL/L — CRITICAL HIGH (ref 3.5–5.3)
POTASSIUM SERPL-SCNC: 5.5 MMOL/L — HIGH (ref 3.5–5.3)
POTASSIUM SERPL-SCNC: 5.6 MMOL/L — HIGH (ref 3.5–5.3)
POTASSIUM SERPL-SCNC: 6.2 MMOL/L — CRITICAL HIGH (ref 3.5–5.3)
RBC # BLD: 2.67 M/UL — LOW (ref 4.2–5.8)
RBC # FLD: 15.9 % — HIGH (ref 10.3–14.5)
SODIUM SERPL-SCNC: 135 MMOL/L — SIGNIFICANT CHANGE UP (ref 135–145)
SODIUM SERPL-SCNC: 139 MMOL/L — SIGNIFICANT CHANGE UP (ref 135–145)
SODIUM SERPL-SCNC: 140 MMOL/L — SIGNIFICANT CHANGE UP (ref 135–145)
WBC # BLD: 7.62 K/UL — SIGNIFICANT CHANGE UP (ref 3.8–10.5)
WBC # FLD AUTO: 7.62 K/UL — SIGNIFICANT CHANGE UP (ref 3.8–10.5)

## 2022-07-18 PROCEDURE — 93010 ELECTROCARDIOGRAM REPORT: CPT

## 2022-07-18 PROCEDURE — 99254 IP/OBS CNSLTJ NEW/EST MOD 60: CPT | Mod: GC

## 2022-07-18 RX ORDER — SODIUM ZIRCONIUM CYCLOSILICATE 10 G/10G
10 POWDER, FOR SUSPENSION ORAL EVERY 8 HOURS
Refills: 0 | Status: COMPLETED | OUTPATIENT
Start: 2022-07-18 | End: 2022-07-19

## 2022-07-18 RX ORDER — SODIUM ZIRCONIUM CYCLOSILICATE 10 G/10G
10 POWDER, FOR SUSPENSION ORAL ONCE
Refills: 0 | Status: COMPLETED | OUTPATIENT
Start: 2022-07-18 | End: 2022-07-18

## 2022-07-18 RX ORDER — SODIUM BICARBONATE 1 MEQ/ML
1300 SYRINGE (ML) INTRAVENOUS
Refills: 0 | Status: DISCONTINUED | OUTPATIENT
Start: 2022-07-18 | End: 2022-07-19

## 2022-07-18 RX ORDER — INSULIN HUMAN 100 [IU]/ML
5 INJECTION, SOLUTION SUBCUTANEOUS ONCE
Refills: 0 | Status: COMPLETED | OUTPATIENT
Start: 2022-07-18 | End: 2022-07-18

## 2022-07-18 RX ORDER — DEXTROSE 50 % IN WATER 50 %
50 SYRINGE (ML) INTRAVENOUS ONCE
Refills: 0 | Status: COMPLETED | OUTPATIENT
Start: 2022-07-18 | End: 2022-07-18

## 2022-07-18 RX ADMIN — CYCLOBENZAPRINE HYDROCHLORIDE 5 MILLIGRAM(S): 10 TABLET, FILM COATED ORAL at 05:46

## 2022-07-18 RX ADMIN — Medication 25 GRAM(S): at 01:26

## 2022-07-18 RX ADMIN — CYCLOBENZAPRINE HYDROCHLORIDE 5 MILLIGRAM(S): 10 TABLET, FILM COATED ORAL at 22:39

## 2022-07-18 RX ADMIN — Medication 50 MILLILITER(S): at 21:19

## 2022-07-18 RX ADMIN — OXYCODONE HYDROCHLORIDE 15 MILLIGRAM(S): 5 TABLET ORAL at 22:40

## 2022-07-18 RX ADMIN — Medication 1000 MILLIGRAM(S): at 05:59

## 2022-07-18 RX ADMIN — Medication 325 MILLIGRAM(S): at 12:12

## 2022-07-18 RX ADMIN — Medication 1000 MILLIGRAM(S): at 00:41

## 2022-07-18 RX ADMIN — Medication 1300 MILLIGRAM(S): at 18:37

## 2022-07-18 RX ADMIN — SODIUM ZIRCONIUM CYCLOSILICATE 10 GRAM(S): 10 POWDER, FOR SUSPENSION ORAL at 09:04

## 2022-07-18 RX ADMIN — OXYCODONE HYDROCHLORIDE 15 MILLIGRAM(S): 5 TABLET ORAL at 10:00

## 2022-07-18 RX ADMIN — OXYCODONE HYDROCHLORIDE 15 MILLIGRAM(S): 5 TABLET ORAL at 09:12

## 2022-07-18 RX ADMIN — INSULIN HUMAN 5 UNIT(S): 100 INJECTION, SOLUTION SUBCUTANEOUS at 01:36

## 2022-07-18 RX ADMIN — Medication 400 MILLIGRAM(S): at 05:47

## 2022-07-18 RX ADMIN — SODIUM ZIRCONIUM CYCLOSILICATE 10 GRAM(S): 10 POWDER, FOR SUSPENSION ORAL at 17:09

## 2022-07-18 RX ADMIN — POLYETHYLENE GLYCOL 3350 17 GRAM(S): 17 POWDER, FOR SOLUTION ORAL at 12:11

## 2022-07-18 RX ADMIN — PANTOPRAZOLE SODIUM 40 MILLIGRAM(S): 20 TABLET, DELAYED RELEASE ORAL at 05:48

## 2022-07-18 RX ADMIN — HEPARIN SODIUM 5000 UNIT(S): 5000 INJECTION INTRAVENOUS; SUBCUTANEOUS at 17:09

## 2022-07-18 RX ADMIN — HEPARIN SODIUM 5000 UNIT(S): 5000 INJECTION INTRAVENOUS; SUBCUTANEOUS at 05:45

## 2022-07-18 RX ADMIN — INSULIN HUMAN 5 UNIT(S): 100 INJECTION, SOLUTION SUBCUTANEOUS at 21:19

## 2022-07-18 RX ADMIN — OXYCODONE HYDROCHLORIDE 15 MILLIGRAM(S): 5 TABLET ORAL at 01:45

## 2022-07-18 RX ADMIN — Medication 400 MILLIGRAM(S): at 15:01

## 2022-07-18 RX ADMIN — SENNA PLUS 1 TABLET(S): 8.6 TABLET ORAL at 22:39

## 2022-07-18 RX ADMIN — Medication 500 MILLIGRAM(S): at 12:12

## 2022-07-18 RX ADMIN — OXYCODONE HYDROCHLORIDE 15 MILLIGRAM(S): 5 TABLET ORAL at 14:00

## 2022-07-18 RX ADMIN — Medication 50 MILLILITER(S): at 01:22

## 2022-07-18 RX ADMIN — OXYCODONE HYDROCHLORIDE 15 MILLIGRAM(S): 5 TABLET ORAL at 02:30

## 2022-07-18 RX ADMIN — CYCLOBENZAPRINE HYDROCHLORIDE 5 MILLIGRAM(S): 10 TABLET, FILM COATED ORAL at 13:17

## 2022-07-18 RX ADMIN — OXYCODONE HYDROCHLORIDE 15 MILLIGRAM(S): 5 TABLET ORAL at 13:17

## 2022-07-18 RX ADMIN — Medication 650 MILLIGRAM(S): at 05:46

## 2022-07-18 RX ADMIN — OXYCODONE HYDROCHLORIDE 15 MILLIGRAM(S): 5 TABLET ORAL at 23:10

## 2022-07-18 NOTE — CONSULT NOTE ADULT - SUBJECTIVE AND OBJECTIVE BOX
NEPHROLOGY CONSULTATION NOTE    HPI:  61M with PMHx of chronic pancreatitis (complicated by chronic pain of which secondary to alcohol abuse), CKD4, hep C s/p SVR and benign esophageal stricture (post-stent 4/19/22 c/b need for CRE balloon dilation, reposition and suture on 4/29) presents with epigastric pain. Patient is AAOx3 but intermittently fell asleep during the interview and was able to provide some information. The pain is sharp, 10/10 in severity, and constant. Nothing improves or worsens the pain. He was able to tolerate some food but has stopped eating since today morning. He has been passing gas and had a BM yesterday. He denies any episode of nausea or vomiting after coming to the ED. He has been feeling nauseous intermittently. Otherwise, denies any fever, chills, nausea, chest pain, SOB or LE edema. In the ED, his vitals were stable. Labs were notable for chronic anemia, YUNIEL on CKD. CT A/P showed redemonstration of findings of chronic pancreatitis.     Patient seen at bedside. Patient with complaints of diffuse abdominal pain that has been persistent since admission. However, patient is tolerating PO diet and denies any nausea or vomiting today. Denies any chest pain or palpitations. Patient unclear about his CKD history. Per pt, he does not follow an outpatient nephrologist and says his CKD has been managed by his PCP.     PAST MEDICAL & SURGICAL HISTORY:  ETOH abuse  sober x1 year approximately      Pancreatitis      Hepatitis C  treated      Gout      HTN (hypertension)      Chronic kidney disease (CKD)      H/O chronic pancreatitis      Gout      Alcohol abuse      Gastric perforation        Allergies:  No Known Allergies    Home Medications Reviewed  Hospital Medications:   MEDICATIONS  (STANDING):  acetaminophen   IVPB .. 1000 milliGRAM(s) IV Intermittent every 8 hours  ascorbic acid 500 milliGRAM(s) Oral daily  cyclobenzaprine 5 milliGRAM(s) Oral three times a day  ferrous    sulfate 325 milliGRAM(s) Oral daily  heparin   Injectable 5000 Unit(s) SubCutaneous every 12 hours  pantoprazole    Tablet 40 milliGRAM(s) Oral before breakfast  polyethylene glycol 3350 17 Gram(s) Oral daily  senna 1 Tablet(s) Oral at bedtime  sodium bicarbonate 650 milliGRAM(s) Oral two times a day  sodium zirconium cyclosilicate 10 Gram(s) Oral every 8 hours    SOCIAL HISTORY:  Denies ETOH,Smoking,   FAMILY HISTORY:  FH: HTN (hypertension)        REVIEW OF SYSTEMS:  CONSTITUTIONAL: No weakness, fevers or chills  EYES/ENT: No visual changes;  No vertigo or throat pain   NECK: No pain or stiffness  RESPIRATORY: No cough, wheezing, hemoptysis; No shortness of breath  CARDIOVASCULAR: No chest pain or palpitations.  GASTROINTESTINAL: + diffuse abdominal pain. +bloating and hiccups. No nausea, vomiting, or hematemesis; No diarrhea or constipation. No melena or hematochezia.  GENITOURINARY: No dysuria, frequency, foamy urine, urinary urgency, incontinence or hematuria  NEUROLOGICAL: No numbness or weakness  SKIN: No itching, burning, rashes, or lesions   VASCULAR: No bilateral lower extremity edema.   All other review of systems is negative unless indicated above.    VITALS:  Vital Signs Last 24 Hrs  T(C): 36.5 (18 Jul 2022 12:07), Max: 37 (17 Jul 2022 21:00)  T(F): 97.7 (18 Jul 2022 12:07), Max: 98.6 (17 Jul 2022 21:00)  HR: 100 (18 Jul 2022 12:07) (79 - 100)  BP: 163/85 (18 Jul 2022 12:07) (134/65 - 163/85)  BP(mean): --  RR: 18 (18 Jul 2022 12:07) (18 - 19)  SpO2: 100% (18 Jul 2022 12:07) (100% - 100%)    Parameters below as of 18 Jul 2022 12:07  Patient On (Oxygen Delivery Method): room air        07-17 @ 07:01 - 07-18 @ 07:00  --------------------------------------------------------  IN: 1070 mL / OUT: 1300 mL / NET: -230 mL    07-18 @ 07:01 - 07-18 @ 13:24  --------------------------------------------------------  IN: 440 mL / OUT: 500 mL / NET: -60 mL        PHYSICAL EXAM:  Constitutional: NAD  HEENT: anicteric sclera, oropharynx clear, MMM  Neck: No JVD  Respiratory: CTAB, no wheezes, rales or rhonchi  Cardiovascular: S1, S2, RRR  Gastrointestinal: Moderate diffuse tenderness. BS+, soft.   Extremities: No cyanosis or clubbing. Nonpitting edema in ankles B/L  Neurological: A/O x 3, no focal deficits  : No CVA tenderness. No chen.   Skin: No rashes  Vascular Access:    LABS:  07-18    140  |  111<H>  |  46<H>  ----------------------------<  119<H>  5.5<H>   |  20<L>  |  2.42<H>    Ca    8.6      18 Jul 2022 11:22  Phos  2.9     07-18  Mg     2.00     07-18    TPro  5.6<L>  /  Alb  3.0<L>  /  TBili  <0.2  /  DBili      /  AST  10  /  ALT  6   /  AlkPhos  86  07-17    Creatinine Trend: 2.42 <--, 2.50 <--, 2.47 <--, 2.64 <--, 2.19 <--, 2.19 <--, 2.42 <--, 2.39 <--, 2.44 <--, 2.39 <--, 2.20 <--, 2.20 <--, 2.30 <--, 2.41 <--, 2.31 <--, 2.76 <--, 3.64 <--                        8.5    7.62  )-----------( 231      ( 18 Jul 2022 04:11 )             27.9     Urine Studies:              RADIOLOGY & ADDITIONAL STUDIES:

## 2022-07-18 NOTE — CONSULT NOTE ADULT - PROBLEM SELECTOR RECOMMENDATION 9
Hyperkalemia likely in setting of CKD. Patient currently asymptomatic and without EKG changes. Agree with temporizing measures for now. Start lokelma 10mg TID. Monitor BMP and K levels q6h. Agree with switching to low K diet. If K levels persistently elevated can give regular insulin with D50. No indication for dialysis at this time. Avoid ACEi or ARB for BP management. Hyperkalemia likely in setting of CKD. Patient currently asymptomatic and without EKG changes. Agree with temporizing measures for now. Start lokelma 10mg TID. Monitor BMP and K levels q6h. Agree with switching to low K diet. If K levels persistently elevated can give regular insulin with D50. Recommend increasing sodium bicarb tabs to 1300mg BID. No indication for dialysis at this time. Avoid ACEi or ARB for BP management.

## 2022-07-18 NOTE — PROGRESS NOTE ADULT - SUBJECTIVE AND OBJECTIVE BOX
TEAM Surgery Progress Note  Patient is a 61y old  Male who presents with a chief complaint of abdominal pain (17 Jul 2022 19:31)      INTERVAL EVENTS: Patient is POD# ***No acute events overnight.  SUBJECTIVE: Patient seen and examined at bedside with surgical team, patient without complaints. Denies fever, chills, CP, SOB nausea, vomiting, abdominal pain.    REVIEW OF SYSTEMS:  Constitutional: No fevers or chills. No malaise or weakness.  EENT: No vision changes. No ear pain. No nasal congestion or rhinitis. No throat pain or dysphagia.  Respiratory: No cough, wheezing, or SOB. No hemoptysis.  Cardiovascular: No chest pain or palpitations.  Gastrointestinal: No abdominal pain. No nausea, vomiting, diarrhea or constipation. No hematochezia. No melena.  Genitourinary: No dysuria, hematuria, or frequency.  Neurologic: No numbness or tingling. No weakness.  Skin: No rashes or pruritus.     OBJECTIVE:    Vital Signs Last 24 Hrs  T(C): 36.8 (18 Jul 2022 00:01), Max: 37.1 (17 Jul 2022 09:32)  T(F): 98.2 (18 Jul 2022 00:01), Max: 98.8 (17 Jul 2022 09:32)  HR: 91 (18 Jul 2022 00:01) (79 - 91)  BP: 137/69 (18 Jul 2022 00:01) (133/66 - 154/75)  BP(mean): --  RR: 18 (18 Jul 2022 00:01) (18 - 19)  SpO2: 100% (18 Jul 2022 00:01) (99% - 100%)    Parameters below as of 18 Jul 2022 00:01  Patient On (Oxygen Delivery Method): room air    I&O's Detail    16 Jul 2022 07:01  -  17 Jul 2022 07:00  --------------------------------------------------------  IN:    Oral Fluid: 420 mL  Total IN: 420 mL    OUT:    Voided (mL): 1950 mL  Total OUT: 1950 mL    Total NET: -1530 mL      17 Jul 2022 07:01  -  18 Jul 2022 01:01  --------------------------------------------------------  IN:    IV PiggyBack: 100 mL    Oral Fluid: 680 mL  Total IN: 780 mL    OUT:    Voided (mL): 1100 mL  Total OUT: 1100 mL    Total NET: -320 mL      MEDICATIONS  (STANDING):  acetaminophen   IVPB .. 1000 milliGRAM(s) IV Intermittent every 8 hours  ascorbic acid 500 milliGRAM(s) Oral daily  cyclobenzaprine 5 milliGRAM(s) Oral three times a day  dextrose 50% Injectable 50 milliLiter(s) IV Push once  ferrous    sulfate 325 milliGRAM(s) Oral daily  heparin   Injectable 5000 Unit(s) SubCutaneous every 12 hours  insulin regular  human recombinant. 5 Unit(s) IV Push once  magnesium sulfate  IVPB 2 Gram(s) IV Intermittent once  pantoprazole    Tablet 40 milliGRAM(s) Oral before breakfast  polyethylene glycol 3350 17 Gram(s) Oral daily  senna 1 Tablet(s) Oral at bedtime  sodium bicarbonate 650 milliGRAM(s) Oral two times a day    MEDICATIONS  (PRN):  aluminum hydroxide/magnesium hydroxide/simethicone Suspension 30 milliLiter(s) Oral every 4 hours PRN Dyspepsia  melatonin 3 milliGRAM(s) Oral at bedtime PRN Insomnia  ondansetron Injectable 4 milliGRAM(s) IV Push every 8 hours PRN Nausea and/or Vomiting  oxyCODONE    IR 15 milliGRAM(s) Oral every 4 hours PRN Severe Pain (7 - 10)      PHYSICAL EXAM:  Constitutional: A&Ox3, NAD  Respiratory: Unlabored breathing  Abdomen: Soft, nondistended, NTTP. No rebound or guarding.  Extremities: WWP, GAR spontaneously    LABS:                        8.3    7.79  )-----------( 235      ( 17 Jul 2022 12:34 )             29.3     07-17    139  |  112<H>  |  46<H>  ----------------------------<  83  5.7<H>   |  16<L>  |  2.47<H>    Ca    8.6      17 Jul 2022 19:01  Phos  2.7     07-17  Mg     1.80     07-17    TPro  5.6<L>  /  Alb  3.0<L>  /  TBili  <0.2  /  DBili  x   /  AST  10  /  ALT  6   /  AlkPhos  86  07-17      LIVER FUNCTIONS - ( 17 Jul 2022 06:24 )  Alb: 3.0 g/dL / Pro: 5.6 g/dL / ALK PHOS: 86 U/L / ALT: 6 U/L / AST: 10 U/L / GGT: x                 IMAGING:     TEAM Surgery Progress Note    SUBJECTIVE: Patient seen and examined at bedside with surgical team, patient without complaints. Denies fever, chills, CP, SOB nausea, vomiting, abdominal pain.       OBJECTIVE:    Vital Signs Last 24 Hrs  T(C): 36.8 (18 Jul 2022 00:01), Max: 37.1 (17 Jul 2022 09:32)  T(F): 98.2 (18 Jul 2022 00:01), Max: 98.8 (17 Jul 2022 09:32)  HR: 91 (18 Jul 2022 00:01) (79 - 91)  BP: 137/69 (18 Jul 2022 00:01) (133/66 - 154/75)  BP(mean): --  RR: 18 (18 Jul 2022 00:01) (18 - 19)  SpO2: 100% (18 Jul 2022 00:01) (99% - 100%)    Parameters below as of 18 Jul 2022 00:01  Patient On (Oxygen Delivery Method): room air    I&O's Detail    16 Jul 2022 07:01  -  17 Jul 2022 07:00  --------------------------------------------------------  IN:    Oral Fluid: 420 mL  Total IN: 420 mL    OUT:    Voided (mL): 1950 mL  Total OUT: 1950 mL    Total NET: -1530 mL      17 Jul 2022 07:01  -  18 Jul 2022 01:01  --------------------------------------------------------  IN:    IV PiggyBack: 100 mL    Oral Fluid: 680 mL  Total IN: 780 mL    OUT:    Voided (mL): 1100 mL  Total OUT: 1100 mL    Total NET: -320 mL      MEDICATIONS  (STANDING):  acetaminophen   IVPB .. 1000 milliGRAM(s) IV Intermittent every 8 hours  ascorbic acid 500 milliGRAM(s) Oral daily  cyclobenzaprine 5 milliGRAM(s) Oral three times a day  dextrose 50% Injectable 50 milliLiter(s) IV Push once  ferrous    sulfate 325 milliGRAM(s) Oral daily  heparin   Injectable 5000 Unit(s) SubCutaneous every 12 hours  insulin regular  human recombinant. 5 Unit(s) IV Push once  magnesium sulfate  IVPB 2 Gram(s) IV Intermittent once  pantoprazole    Tablet 40 milliGRAM(s) Oral before breakfast  polyethylene glycol 3350 17 Gram(s) Oral daily  senna 1 Tablet(s) Oral at bedtime  sodium bicarbonate 650 milliGRAM(s) Oral two times a day    MEDICATIONS  (PRN):  aluminum hydroxide/magnesium hydroxide/simethicone Suspension 30 milliLiter(s) Oral every 4 hours PRN Dyspepsia  melatonin 3 milliGRAM(s) Oral at bedtime PRN Insomnia  ondansetron Injectable 4 milliGRAM(s) IV Push every 8 hours PRN Nausea and/or Vomiting  oxyCODONE    IR 15 milliGRAM(s) Oral every 4 hours PRN Severe Pain (7 - 10)      PHYSICAL EXAM:  Constitutional: A&Ox3, NAD  Respiratory: Unlabored breathing  Abdomen: Soft, nondistended, NTTP. No rebound or guarding.  Extremities: WWP, GAR spontaneously    LABS:                        8.3    7.79  )-----------( 235      ( 17 Jul 2022 12:34 )             29.3     07-17    139  |  112<H>  |  46<H>  ----------------------------<  83  5.7<H>   |  16<L>  |  2.47<H>    Ca    8.6      17 Jul 2022 19:01  Phos  2.7     07-17  Mg     1.80     07-17    TPro  5.6<L>  /  Alb  3.0<L>  /  TBili  <0.2  /  DBili  x   /  AST  10  /  ALT  6   /  AlkPhos  86  07-17      LIVER FUNCTIONS - ( 17 Jul 2022 06:24 )  Alb: 3.0 g/dL / Pro: 5.6 g/dL / ALK PHOS: 86 U/L / ALT: 6 U/L / AST: 10 U/L / GGT: x                 IMAGING:

## 2022-07-18 NOTE — CONSULT NOTE ADULT - PROBLEM SELECTOR RECOMMENDATION 2
Stage 4 CKD, baseline creatinine around 2.5-3.0. Currently appears to be at baseline. Urine output adequate (1.3L yesterday, 500cc today so far). Continue to monitor I/Os. Avoid nephrotoxic medications and dose medications as per eGFR.      NOTE IS INCOMPLETE. PLEASE WAIT FOR FINAL RECOMMENDATIONS. Stage 4 CKD, baseline creatinine around 2.5-3.0. Currently appears to be at baseline. Patient does not currently have a nephrologist. Unclear etiology of CKD at this time. Urine output adequate (1.3L yesterday, 500cc today so far). Recommend checking UA and spot urine protein/cr ratio. Continue to monitor I/Os. Avoid nephrotoxic medications and dose medications as per eGFR.        Plan discussed with Dr. Chambers

## 2022-07-18 NOTE — PROGRESS NOTE ADULT - ASSESSMENT
61M with PMHx of ETOH abuse, chronic pancreatitis with chronic PD dilation, PD stone and calcifications, perforated gastric ulcer s/p exlap with David patch repair (2019, Dr. Murray), CKD4, hep C s/p SVR and benign esophageal stricture (post-stent 4/19/22 c/b need for CRE balloon dilation, reposition and suture on 4/29) presents with a flare of chronic pancreatitis. Surgical oncology consulted to evaluate for Puestow procedure.    Plan/Recommendations:  -Hyperkalemic overnight -- will f/u AM labs and shift as necessary  -O&P for eval of stool parasites  -Will reach out to outpt Nephro for recs re: CKD/hyperkalemia  - plan for OR next Wednesday   - patient intermediate risk per medicine  - cards: intermediate risk for low-mod risk procedure, no severe AS/MS, no decompensated HF, no ACS, no tachy-jacquelyn arrythmia, no cardiac contraindication to proceed  - continue diet as tolerated  - H/H downtrending, continue to monitor  - Will add ensure enlive TID  - Will reassess patients patient medication requirement 61M with PMHx of ETOH abuse, chronic pancreatitis with chronic PD dilation, PD stone and calcifications, perforated gastric ulcer s/p exlap with David patch repair (2019, Dr. Murray), CKD4, hep C s/p SVR and benign esophageal stricture (post-stent 4/19/22 c/b need for CRE balloon dilation, reposition and suture on 4/29) presents with a flare of chronic pancreatitis. Surgical oncology consulted to evaluate for Puestow procedure.    Plan/Recommendations:  - Hyperkalemic overnight -- will f/u AM labs and shift as necessary  - O&P for eval of stool parasites  - Nephro c/s: CKD/hyperkalemia  - plan for OR next Wednesday   - patient intermediate risk per medicine  - cards: intermediate risk for low-mod risk procedure, no severe AS/MS, no decompensated HF, no ACS, no tachy-jacquelyn arrythmia, no cardiac contraindication to proceed  - continue diet as tolerated

## 2022-07-19 ENCOUNTER — TRANSCRIPTION ENCOUNTER (OUTPATIENT)
Age: 62
End: 2022-07-19

## 2022-07-19 LAB
ALBUMIN SERPL ELPH-MCNC: 3.5 G/DL — SIGNIFICANT CHANGE UP (ref 3.3–5)
ALP SERPL-CCNC: 98 U/L — SIGNIFICANT CHANGE UP (ref 40–120)
ALT FLD-CCNC: 7 U/L — SIGNIFICANT CHANGE UP (ref 4–41)
ANION GAP SERPL CALC-SCNC: 10 MMOL/L — SIGNIFICANT CHANGE UP (ref 7–14)
ANION GAP SERPL CALC-SCNC: 11 MMOL/L — SIGNIFICANT CHANGE UP (ref 7–14)
ANION GAP SERPL CALC-SCNC: 12 MMOL/L — SIGNIFICANT CHANGE UP (ref 7–14)
AST SERPL-CCNC: 9 U/L — SIGNIFICANT CHANGE UP (ref 4–40)
BILIRUB SERPL-MCNC: <0.2 MG/DL — SIGNIFICANT CHANGE UP (ref 0.2–1.2)
BUN SERPL-MCNC: 49 MG/DL — HIGH (ref 7–23)
BUN SERPL-MCNC: 51 MG/DL — HIGH (ref 7–23)
BUN SERPL-MCNC: 51 MG/DL — HIGH (ref 7–23)
CALCIUM SERPL-MCNC: 8.5 MG/DL — SIGNIFICANT CHANGE UP (ref 8.4–10.5)
CALCIUM SERPL-MCNC: 8.7 MG/DL — SIGNIFICANT CHANGE UP (ref 8.4–10.5)
CALCIUM SERPL-MCNC: 8.9 MG/DL — SIGNIFICANT CHANGE UP (ref 8.4–10.5)
CHLORIDE SERPL-SCNC: 109 MMOL/L — HIGH (ref 98–107)
CHLORIDE SERPL-SCNC: 110 MMOL/L — HIGH (ref 98–107)
CHLORIDE SERPL-SCNC: 113 MMOL/L — HIGH (ref 98–107)
CO2 SERPL-SCNC: 17 MMOL/L — LOW (ref 22–31)
CO2 SERPL-SCNC: 18 MMOL/L — LOW (ref 22–31)
CO2 SERPL-SCNC: 20 MMOL/L — LOW (ref 22–31)
CREAT SERPL-MCNC: 2.41 MG/DL — HIGH (ref 0.5–1.3)
CREAT SERPL-MCNC: 2.48 MG/DL — HIGH (ref 0.5–1.3)
CREAT SERPL-MCNC: 2.5 MG/DL — HIGH (ref 0.5–1.3)
EGFR: 28 ML/MIN/1.73M2 — LOW
EGFR: 29 ML/MIN/1.73M2 — LOW
EGFR: 30 ML/MIN/1.73M2 — LOW
GLUCOSE BLDC GLUCOMTR-MCNC: 135 MG/DL — HIGH (ref 70–99)
GLUCOSE BLDC GLUCOMTR-MCNC: 97 MG/DL — SIGNIFICANT CHANGE UP (ref 70–99)
GLUCOSE SERPL-MCNC: 113 MG/DL — HIGH (ref 70–99)
GLUCOSE SERPL-MCNC: 124 MG/DL — HIGH (ref 70–99)
GLUCOSE SERPL-MCNC: 98 MG/DL — SIGNIFICANT CHANGE UP (ref 70–99)
HCT VFR BLD CALC: 25.5 % — LOW (ref 39–50)
HGB BLD-MCNC: 8.1 G/DL — LOW (ref 13–17)
MAGNESIUM SERPL-MCNC: 1.8 MG/DL — SIGNIFICANT CHANGE UP (ref 1.6–2.6)
MAGNESIUM SERPL-MCNC: 1.9 MG/DL — SIGNIFICANT CHANGE UP (ref 1.6–2.6)
MCHC RBC-ENTMCNC: 31.8 GM/DL — LOW (ref 32–36)
MCHC RBC-ENTMCNC: 32.3 PG — SIGNIFICANT CHANGE UP (ref 27–34)
MCV RBC AUTO: 101.6 FL — HIGH (ref 80–100)
NRBC # BLD: 0 /100 WBCS — SIGNIFICANT CHANGE UP
NRBC # FLD: 0 K/UL — SIGNIFICANT CHANGE UP
PHOSPHATE SERPL-MCNC: 3.1 MG/DL — SIGNIFICANT CHANGE UP (ref 2.5–4.5)
PHOSPHATE SERPL-MCNC: 3.6 MG/DL — SIGNIFICANT CHANGE UP (ref 2.5–4.5)
PLATELET # BLD AUTO: 235 K/UL — SIGNIFICANT CHANGE UP (ref 150–400)
POTASSIUM SERPL-MCNC: 5.6 MMOL/L — HIGH (ref 3.5–5.3)
POTASSIUM SERPL-MCNC: 5.9 MMOL/L — HIGH (ref 3.5–5.3)
POTASSIUM SERPL-MCNC: 6 MMOL/L — HIGH (ref 3.5–5.3)
POTASSIUM SERPL-SCNC: 5.6 MMOL/L — HIGH (ref 3.5–5.3)
POTASSIUM SERPL-SCNC: 5.9 MMOL/L — HIGH (ref 3.5–5.3)
POTASSIUM SERPL-SCNC: 6 MMOL/L — HIGH (ref 3.5–5.3)
PROT SERPL-MCNC: 6 G/DL — SIGNIFICANT CHANGE UP (ref 6–8.3)
RBC # BLD: 2.51 M/UL — LOW (ref 4.2–5.8)
RBC # FLD: 16.1 % — HIGH (ref 10.3–14.5)
SARS-COV-2 RNA SPEC QL NAA+PROBE: SIGNIFICANT CHANGE UP
SODIUM SERPL-SCNC: 139 MMOL/L — SIGNIFICANT CHANGE UP (ref 135–145)
SODIUM SERPL-SCNC: 140 MMOL/L — SIGNIFICANT CHANGE UP (ref 135–145)
SODIUM SERPL-SCNC: 141 MMOL/L — SIGNIFICANT CHANGE UP (ref 135–145)
WBC # BLD: 7.5 K/UL — SIGNIFICANT CHANGE UP (ref 3.8–10.5)
WBC # FLD AUTO: 7.5 K/UL — SIGNIFICANT CHANGE UP (ref 3.8–10.5)

## 2022-07-19 PROCEDURE — 99233 SBSQ HOSP IP/OBS HIGH 50: CPT | Mod: GC

## 2022-07-19 RX ORDER — FUROSEMIDE 40 MG
40 TABLET ORAL DAILY
Refills: 0 | Status: DISCONTINUED | OUTPATIENT
Start: 2022-07-19 | End: 2022-07-20

## 2022-07-19 RX ORDER — DEXTROSE 50 % IN WATER 50 %
50 SYRINGE (ML) INTRAVENOUS ONCE
Refills: 0 | Status: COMPLETED | OUTPATIENT
Start: 2022-07-19 | End: 2022-07-19

## 2022-07-19 RX ORDER — INSULIN HUMAN 100 [IU]/ML
5 INJECTION, SOLUTION SUBCUTANEOUS ONCE
Refills: 0 | Status: COMPLETED | OUTPATIENT
Start: 2022-07-19 | End: 2022-07-19

## 2022-07-19 RX ORDER — SODIUM CHLORIDE 9 MG/ML
1000 INJECTION, SOLUTION INTRAVENOUS
Refills: 0 | Status: DISCONTINUED | OUTPATIENT
Start: 2022-07-19 | End: 2022-07-20

## 2022-07-19 RX ORDER — SODIUM BICARBONATE 1 MEQ/ML
1300 SYRINGE (ML) INTRAVENOUS THREE TIMES A DAY
Refills: 0 | Status: DISCONTINUED | OUTPATIENT
Start: 2022-07-19 | End: 2022-07-21

## 2022-07-19 RX ORDER — SODIUM ZIRCONIUM CYCLOSILICATE 10 G/10G
10 POWDER, FOR SUSPENSION ORAL THREE TIMES A DAY
Refills: 0 | Status: DISCONTINUED | OUTPATIENT
Start: 2022-07-19 | End: 2022-07-21

## 2022-07-19 RX ADMIN — CYCLOBENZAPRINE HYDROCHLORIDE 5 MILLIGRAM(S): 10 TABLET, FILM COATED ORAL at 13:28

## 2022-07-19 RX ADMIN — OXYCODONE HYDROCHLORIDE 15 MILLIGRAM(S): 5 TABLET ORAL at 16:12

## 2022-07-19 RX ADMIN — SODIUM ZIRCONIUM CYCLOSILICATE 10 GRAM(S): 10 POWDER, FOR SUSPENSION ORAL at 21:45

## 2022-07-19 RX ADMIN — Medication 1300 MILLIGRAM(S): at 13:29

## 2022-07-19 RX ADMIN — Medication 50 MILLILITER(S): at 08:52

## 2022-07-19 RX ADMIN — Medication 1300 MILLIGRAM(S): at 05:26

## 2022-07-19 RX ADMIN — Medication 50 MILLILITER(S): at 22:31

## 2022-07-19 RX ADMIN — OXYCODONE HYDROCHLORIDE 15 MILLIGRAM(S): 5 TABLET ORAL at 19:59

## 2022-07-19 RX ADMIN — Medication 500 MILLIGRAM(S): at 12:21

## 2022-07-19 RX ADMIN — CYCLOBENZAPRINE HYDROCHLORIDE 5 MILLIGRAM(S): 10 TABLET, FILM COATED ORAL at 05:26

## 2022-07-19 RX ADMIN — HEPARIN SODIUM 5000 UNIT(S): 5000 INJECTION INTRAVENOUS; SUBCUTANEOUS at 18:46

## 2022-07-19 RX ADMIN — INSULIN HUMAN 5 UNIT(S): 100 INJECTION, SOLUTION SUBCUTANEOUS at 08:52

## 2022-07-19 RX ADMIN — Medication 325 MILLIGRAM(S): at 12:21

## 2022-07-19 RX ADMIN — SODIUM ZIRCONIUM CYCLOSILICATE 10 GRAM(S): 10 POWDER, FOR SUSPENSION ORAL at 13:28

## 2022-07-19 RX ADMIN — OXYCODONE HYDROCHLORIDE 15 MILLIGRAM(S): 5 TABLET ORAL at 03:32

## 2022-07-19 RX ADMIN — OXYCODONE HYDROCHLORIDE 15 MILLIGRAM(S): 5 TABLET ORAL at 09:20

## 2022-07-19 RX ADMIN — INSULIN HUMAN 5 UNIT(S): 100 INJECTION, SOLUTION SUBCUTANEOUS at 22:32

## 2022-07-19 RX ADMIN — OXYCODONE HYDROCHLORIDE 15 MILLIGRAM(S): 5 TABLET ORAL at 15:38

## 2022-07-19 RX ADMIN — HEPARIN SODIUM 5000 UNIT(S): 5000 INJECTION INTRAVENOUS; SUBCUTANEOUS at 05:27

## 2022-07-19 RX ADMIN — OXYCODONE HYDROCHLORIDE 15 MILLIGRAM(S): 5 TABLET ORAL at 08:51

## 2022-07-19 RX ADMIN — OXYCODONE HYDROCHLORIDE 15 MILLIGRAM(S): 5 TABLET ORAL at 03:02

## 2022-07-19 RX ADMIN — OXYCODONE HYDROCHLORIDE 15 MILLIGRAM(S): 5 TABLET ORAL at 20:36

## 2022-07-19 RX ADMIN — Medication 40 MILLIGRAM(S): at 12:21

## 2022-07-19 RX ADMIN — SODIUM ZIRCONIUM CYCLOSILICATE 10 GRAM(S): 10 POWDER, FOR SUSPENSION ORAL at 09:03

## 2022-07-19 RX ADMIN — SODIUM ZIRCONIUM CYCLOSILICATE 10 GRAM(S): 10 POWDER, FOR SUSPENSION ORAL at 02:13

## 2022-07-19 RX ADMIN — PANTOPRAZOLE SODIUM 40 MILLIGRAM(S): 20 TABLET, DELAYED RELEASE ORAL at 05:26

## 2022-07-19 NOTE — PROGRESS NOTE ADULT - ASSESSMENT
61M with PMHx of ETOH abuse, chronic pancreatitis with chronic PD dilation, PD stone and calcifications, perforated gastric ulcer s/p exlap with David patch repair (2019, Dr. Murray), CKD4, hep C s/p SVR and benign esophageal stricture (post-stent 4/19/22 c/b need for CRE balloon dilation, reposition and suture on 4/29) presents with a flare of chronic pancreatitis. Surgical oncology consulted to evaluate for Puestow procedure.    Plan/Recommendations:  - Hyperkalemic overnight -- will f/u AM labs and shift as necessary  - O&P for eval of stool parasites  - Nephro c/s: CKD/hyperkalemia  - plan for OR next Wednesday   - patient intermediate risk per medicine  - cards: intermediate risk for low-mod risk procedure, no severe AS/MS, no decompensated HF, no ACS, no tachy-jacquelyn arrythmia, no cardiac contraindication to proceed  - continue diet as tolerated 61M with PMHx of ETOH abuse, chronic pancreatitis with chronic PD dilation, PD stone and calcifications, perforated gastric ulcer s/p exlap with David patch repair (2019, Dr. Murray), CKD4, hep C s/p SVR and benign esophageal stricture (post-stent 4/19/22 c/b need for CRE balloon dilation, reposition and suture on 4/29) presents with a flare of chronic pancreatitis. Surgical oncology consulted to evaluate for Puestow procedure.    Plan/Recommendations:  - Hyperkalemia, continue prn shifting and Lokelma 10 TID   - nephrology following for CKD + hyperkalemia, appreciate recommendations.  - O&P for eval of stool parasites (worms seen in stool by RN)  - OR wednesday  - patient intermediate risk per medicine  - cards: intermediate risk for low-mod risk procedure, no severe AS/MS, no decompensated HF, no ACS, no tachy-jacquelyn arrythmia, no cardiac contraindication to proceed  - continue diet as tolerated

## 2022-07-19 NOTE — PROGRESS NOTE ADULT - ASSESSMENT
61 M with hx of chronic pancreatitis, alcohol abuse, CKD4, hepatitis C, benign esophageal strictures p/w epigastric pain. Plan for Puestow procedure later this week with surgery. Nephrology consulted for hyperkalemia.

## 2022-07-19 NOTE — PROGRESS NOTE ADULT - PROBLEM SELECTOR PLAN 1
Hyperkalemia likely in setting of CKD. Patient currently asymptomatic and without EKG changes. Continue with temporizing measures. Recommend continuing standing lokelma 10mg TID. Continue low K diet. If K levels persistently elevated can give regular insulin with D50. Recommend increasing sodium bicarb tabs to 1300mg TID. Also recommend starting PO Lasix 40mg qd given persistent hyperkalemia and edema in LE. No indication for dialysis at this time. Avoid ACEi or ARB for BP management.

## 2022-07-19 NOTE — PROGRESS NOTE ADULT - SUBJECTIVE AND OBJECTIVE BOX
TEAM Surgery Progress Note  Patient is a 61y old  Male who presents with a chief complaint of abdominal pain (18 Jul 2022 13:24)      INTERVAL EVENTS: Patient is POD# ***No acute events overnight.  SUBJECTIVE: Patient seen and examined at bedside with surgical team, patient without complaints. Denies fever, chills, CP, SOB nausea, vomiting, abdominal pain.    REVIEW OF SYSTEMS:  Constitutional: No fevers or chills. No malaise or weakness.  EENT: No vision changes. No ear pain. No nasal congestion or rhinitis. No throat pain or dysphagia.  Respiratory: No cough, wheezing, or SOB. No hemoptysis.  Cardiovascular: No chest pain or palpitations.  Gastrointestinal: No abdominal pain. No nausea, vomiting, diarrhea or constipation. No hematochezia. No melena.  Genitourinary: No dysuria, hematuria, or frequency.  Neurologic: No numbness or tingling. No weakness.  Skin: No rashes or pruritus.     OBJECTIVE:    Vital Signs Last 24 Hrs  T(C): 37 (18 Jul 2022 20:11), Max: 37 (18 Jul 2022 04:40)  T(F): 98.6 (18 Jul 2022 20:11), Max: 98.6 (18 Jul 2022 04:40)  HR: 88 (18 Jul 2022 20:11) (82 - 100)  BP: 148/77 (18 Jul 2022 20:11) (134/65 - 163/85)  BP(mean): --  RR: 18 (18 Jul 2022 20:11) (18 - 18)  SpO2: 100% (18 Jul 2022 20:11) (100% - 100%)    Parameters below as of 18 Jul 2022 20:11  Patient On (Oxygen Delivery Method): room air    I&O's Detail    17 Jul 2022 07:01  -  18 Jul 2022 07:00  --------------------------------------------------------  IN:    IV PiggyBack: 150 mL    Oral Fluid: 920 mL  Total IN: 1070 mL    OUT:    Voided (mL): 1300 mL  Total OUT: 1300 mL    Total NET: -230 mL      18 Jul 2022 07:01  -  19 Jul 2022 00:21  --------------------------------------------------------  IN:    Oral Fluid: 810 mL  Total IN: 810 mL    OUT:    Voided (mL): 800 mL  Total OUT: 800 mL    Total NET: 10 mL      MEDICATIONS  (STANDING):  ascorbic acid 500 milliGRAM(s) Oral daily  cyclobenzaprine 5 milliGRAM(s) Oral three times a day  ferrous    sulfate 325 milliGRAM(s) Oral daily  heparin   Injectable 5000 Unit(s) SubCutaneous every 12 hours  pantoprazole    Tablet 40 milliGRAM(s) Oral before breakfast  polyethylene glycol 3350 17 Gram(s) Oral daily  senna 1 Tablet(s) Oral at bedtime  sodium bicarbonate 1300 milliGRAM(s) Oral two times a day  sodium zirconium cyclosilicate 10 Gram(s) Oral every 8 hours    MEDICATIONS  (PRN):  aluminum hydroxide/magnesium hydroxide/simethicone Suspension 30 milliLiter(s) Oral every 4 hours PRN Dyspepsia  melatonin 3 milliGRAM(s) Oral at bedtime PRN Insomnia  ondansetron Injectable 4 milliGRAM(s) IV Push every 8 hours PRN Nausea and/or Vomiting  oxyCODONE    IR 15 milliGRAM(s) Oral every 4 hours PRN Severe Pain (7 - 10)      PHYSICAL EXAM:  Constitutional: A&Ox3, NAD  Respiratory: Unlabored breathing  Abdomen: Soft, nondistended, NTTP. No rebound or guarding.  Extremities: WWP, GAR spontaneously    LABS:                        8.5    7.62  )-----------( 231      ( 18 Jul 2022 04:11 )             27.9     07-18    135  |  105  |  46<H>  ----------------------------<  86  6.2<HH>   |  22  |  2.36<H>    Ca    8.8      18 Jul 2022 18:22  Phos  3.1     07-18  Mg     2.00     07-18    TPro  5.6<L>  /  Alb  3.0<L>  /  TBili  <0.2  /  DBili  x   /  AST  10  /  ALT  6   /  AlkPhos  86  07-17      LIVER FUNCTIONS - ( 17 Jul 2022 06:24 )  Alb: 3.0 g/dL / Pro: 5.6 g/dL / ALK PHOS: 86 U/L / ALT: 6 U/L / AST: 10 U/L / GGT: x                 IMAGING:     TEAM Surgery Progress Note    SUBJECTIVE: Patient seen and examined at bedside with surgical team, patient without complaints. Denies fever, chills, CP, SOB nausea, vomiting, abdominal pain. +/+ bowel function, tolerating diet, pain controlled.      OBJECTIVE:    Vital Signs Last 24 Hrs  T(C): 37 (18 Jul 2022 20:11), Max: 37 (18 Jul 2022 04:40)  T(F): 98.6 (18 Jul 2022 20:11), Max: 98.6 (18 Jul 2022 04:40)  HR: 88 (18 Jul 2022 20:11) (82 - 100)  BP: 148/77 (18 Jul 2022 20:11) (134/65 - 163/85)  BP(mean): --  RR: 18 (18 Jul 2022 20:11) (18 - 18)  SpO2: 100% (18 Jul 2022 20:11) (100% - 100%)    Parameters below as of 18 Jul 2022 20:11  Patient On (Oxygen Delivery Method): room air    I&O's Detail    17 Jul 2022 07:01  -  18 Jul 2022 07:00  --------------------------------------------------------  IN:    IV PiggyBack: 150 mL    Oral Fluid: 920 mL  Total IN: 1070 mL    OUT:    Voided (mL): 1300 mL  Total OUT: 1300 mL    Total NET: -230 mL      18 Jul 2022 07:01  -  19 Jul 2022 00:21  --------------------------------------------------------  IN:    Oral Fluid: 810 mL  Total IN: 810 mL    OUT:    Voided (mL): 800 mL  Total OUT: 800 mL    Total NET: 10 mL      MEDICATIONS  (STANDING):  ascorbic acid 500 milliGRAM(s) Oral daily  cyclobenzaprine 5 milliGRAM(s) Oral three times a day  ferrous    sulfate 325 milliGRAM(s) Oral daily  heparin   Injectable 5000 Unit(s) SubCutaneous every 12 hours  pantoprazole    Tablet 40 milliGRAM(s) Oral before breakfast  polyethylene glycol 3350 17 Gram(s) Oral daily  senna 1 Tablet(s) Oral at bedtime  sodium bicarbonate 1300 milliGRAM(s) Oral two times a day  sodium zirconium cyclosilicate 10 Gram(s) Oral every 8 hours    MEDICATIONS  (PRN):  aluminum hydroxide/magnesium hydroxide/simethicone Suspension 30 milliLiter(s) Oral every 4 hours PRN Dyspepsia  melatonin 3 milliGRAM(s) Oral at bedtime PRN Insomnia  ondansetron Injectable 4 milliGRAM(s) IV Push every 8 hours PRN Nausea and/or Vomiting  oxyCODONE    IR 15 milliGRAM(s) Oral every 4 hours PRN Severe Pain (7 - 10)      PHYSICAL EXAM:  Constitutional: A&Ox3, NAD  Respiratory: Unlabored breathing  Abdomen: Soft, nondistended, NTTP. No rebound or guarding.  Extremities: WWP, GAR spontaneously    LABS:                        8.5    7.62  )-----------( 231      ( 18 Jul 2022 04:11 )             27.9     07-18    135  |  105  |  46<H>  ----------------------------<  86  6.2<HH>   |  22  |  2.36<H>    Ca    8.8      18 Jul 2022 18:22  Phos  3.1     07-18  Mg     2.00     07-18    TPro  5.6<L>  /  Alb  3.0<L>  /  TBili  <0.2  /  DBili  x   /  AST  10  /  ALT  6   /  AlkPhos  86  07-17      LIVER FUNCTIONS - ( 17 Jul 2022 06:24 )  Alb: 3.0 g/dL / Pro: 5.6 g/dL / ALK PHOS: 86 U/L / ALT: 6 U/L / AST: 10 U/L / GGT: x                 IMAGING:

## 2022-07-19 NOTE — PROGRESS NOTE ADULT - SUBJECTIVE AND OBJECTIVE BOX
Nephrology progress note    Patient is a 61y Male with PMHx of chronic pancreatitis (complicated by chronic pain of which secondary to alcohol abuse), CKD4, hep C s/p SVR and benign esophageal stricture (post-stent 4/19/22 c/b need for CRE balloon dilation, reposition and suture on 4/29) presents with epigastric pain. Planned for Puestow procedure. Course complicated by persistent hyperkalemia.     Subjective:  Patient seen this AM, eating breakfast. Denies any nausea, vomiting, and able to tolerate PO still. Endorses persistent abdominal pain. No other symptoms or complaints from patient.     Allergies:  No Known Allergies    Hospital Medications:   MEDICATIONS  (STANDING):  ascorbic acid 500 milliGRAM(s) Oral daily  cyclobenzaprine 5 milliGRAM(s) Oral three times a day  ferrous    sulfate 325 milliGRAM(s) Oral daily  heparin   Injectable 5000 Unit(s) SubCutaneous every 12 hours  pantoprazole    Tablet 40 milliGRAM(s) Oral before breakfast  polyethylene glycol 3350 17 Gram(s) Oral daily  senna 1 Tablet(s) Oral at bedtime  sodium bicarbonate 1300 milliGRAM(s) Oral two times a day    MEDICATIONS  (PRN):  aluminum hydroxide/magnesium hydroxide/simethicone Suspension 30 milliLiter(s) Oral every 4 hours PRN Dyspepsia  melatonin 3 milliGRAM(s) Oral at bedtime PRN Insomnia  ondansetron Injectable 4 milliGRAM(s) IV Push every 8 hours PRN Nausea and/or Vomiting  oxyCODONE    IR 15 milliGRAM(s) Oral every 4 hours PRN Severe Pain (7 - 10)    REVIEW OF SYSTEMS:  CONSTITUTIONAL: No weakness, fevers or chills  EYES/ENT: No visual changes;  No vertigo or throat pain   NECK: No pain or stiffness  RESPIRATORY: No cough, wheezing, hemoptysis; No shortness of breath  CARDIOVASCULAR: No chest pain or palpitations.  GASTROINTESTINAL: +abdominal pain and epigastric pain. No nausea, vomiting, or hematemesis; No diarrhea or constipation. No melena or hematochezia.  GENITOURINARY: No dysuria, frequency, foamy urine, urinary urgency, incontinence or hematuria  NEUROLOGICAL: No numbness or weakness  SKIN: No itching, burning, rashes, or lesions   VASCULAR: +B/L ankle edema   All other review of systems is negative unless indicated above.    VITALS:  Vital Signs Last 24 Hrs  T(C): 36.7 (19 Jul 2022 09:04), Max: 37 (18 Jul 2022 20:11)  T(F): 98.1 (19 Jul 2022 09:04), Max: 98.6 (18 Jul 2022 20:11)  HR: 83 (19 Jul 2022 09:04) (82 - 100)  BP: 156/86 (19 Jul 2022 09:04) (132/67 - 163/85)  BP(mean): --  RR: 16 (19 Jul 2022 09:04) (16 - 18)  SpO2: 100% (19 Jul 2022 09:04) (100% - 100%)    Parameters below as of 19 Jul 2022 09:04  Patient On (Oxygen Delivery Method): room air        07-17 @ 07:01  -  07-18 @ 07:00  --------------------------------------------------------  IN: 1070 mL / OUT: 1300 mL / NET: -230 mL    07-18 @ 07:01  -  07-19 @ 07:00  --------------------------------------------------------  IN: 1110 mL / OUT: 1500 mL / NET: -390 mL        PHYSICAL EXAM:  Constitutional: NAD  HEENT: anicteric sclera, oropharynx clear, MMM  Neck: No JVD  Respiratory: CTAB, no wheezes, rales or rhonchi  Cardiovascular: S1, S2, RRR  Gastrointestinal: BS+, soft, Diffuse abdominal tenderness.   Extremities: No cyanosis or clubbing. 2+ pitting edema in ankles B/L  Neurological: A/O x 3, no focal deficits  : No CVA tenderness. No chen.   Skin: No rashes  Vascular Access:    LABS:  07-19    139  |  109<H>  |  49<H>  ----------------------------<  113<H>  5.9<H>   |  20<L>  |  2.50<H>    Ca    8.7      19 Jul 2022 05:21  Phos  3.1     07-19  Mg     1.90     07-19    TPro  6.0  /  Alb  3.5  /  TBili  <0.2  /  DBili      /  AST  9   /  ALT  7   /  AlkPhos  98  07-19                          8.1    7.50  )-----------( 235      ( 19 Jul 2022 05:21 )             25.5       Urine Studies:      RADIOLOGY & ADDITIONAL STUDIES:

## 2022-07-19 NOTE — PROGRESS NOTE ADULT - PROBLEM SELECTOR PLAN 2
Stage 4 CKD, baseline creatinine around 2.5-3.0. Currently appears to be at baseline. Patient does not currently have a nephrologist. Unclear etiology of CKD at this time. Urine output 1.5L yesterday. Recommend checking UA and spot urine protein/cr ratio. Continue to monitor I/Os. Avoid nephrotoxic medications and dose medications as per eGFR.

## 2022-07-20 ENCOUNTER — RESULT REVIEW (OUTPATIENT)
Age: 62
End: 2022-07-20

## 2022-07-20 ENCOUNTER — APPOINTMENT (OUTPATIENT)
Dept: SURGICAL ONCOLOGY | Facility: HOSPITAL | Age: 62
End: 2022-07-20

## 2022-07-20 ENCOUNTER — APPOINTMENT (OUTPATIENT)
Dept: NEPHROLOGY | Facility: CLINIC | Age: 62
End: 2022-07-20

## 2022-07-20 LAB
ANION GAP SERPL CALC-SCNC: 12 MMOL/L — SIGNIFICANT CHANGE UP (ref 7–14)
APPEARANCE UR: CLEAR — SIGNIFICANT CHANGE UP
BILIRUB UR-MCNC: NEGATIVE — SIGNIFICANT CHANGE UP
BLD GP AB SCN SERPL QL: NEGATIVE — SIGNIFICANT CHANGE UP
BLOOD GAS ARTERIAL - LYTES,HGB,ICA,LACT RESULT: SIGNIFICANT CHANGE UP
BLOOD GAS ARTERIAL - LYTES,HGB,ICA,LACT RESULT: SIGNIFICANT CHANGE UP
BUN SERPL-MCNC: 46 MG/DL — HIGH (ref 7–23)
BUN SERPL-MCNC: 51 MG/DL — HIGH (ref 7–23)
BUN SERPL-MCNC: 51 MG/DL — HIGH (ref 7–23)
CALCIUM SERPL-MCNC: 8.4 MG/DL — SIGNIFICANT CHANGE UP (ref 8.4–10.5)
CALCIUM SERPL-MCNC: 8.6 MG/DL — SIGNIFICANT CHANGE UP (ref 8.4–10.5)
CALCIUM SERPL-MCNC: 8.9 MG/DL — SIGNIFICANT CHANGE UP (ref 8.4–10.5)
CHLORIDE SERPL-SCNC: 107 MMOL/L — SIGNIFICANT CHANGE UP (ref 98–107)
CO2 SERPL-SCNC: 20 MMOL/L — LOW (ref 22–31)
CO2 SERPL-SCNC: 20 MMOL/L — LOW (ref 22–31)
CO2 SERPL-SCNC: 21 MMOL/L — LOW (ref 22–31)
COLOR SPEC: SIGNIFICANT CHANGE UP
CREAT ?TM UR-MCNC: 52 MG/DL — SIGNIFICANT CHANGE UP
CREAT SERPL-MCNC: 2.21 MG/DL — HIGH (ref 0.5–1.3)
CREAT SERPL-MCNC: 2.35 MG/DL — HIGH (ref 0.5–1.3)
CREAT SERPL-MCNC: 2.45 MG/DL — HIGH (ref 0.5–1.3)
DIFF PNL FLD: NEGATIVE — SIGNIFICANT CHANGE UP
EGFR: 29 ML/MIN/1.73M2 — LOW
EGFR: 31 ML/MIN/1.73M2 — LOW
EGFR: 33 ML/MIN/1.73M2 — LOW
GAS PNL BLDA: SIGNIFICANT CHANGE UP
GAS PNL BLDA: SIGNIFICANT CHANGE UP
GLUCOSE SERPL-MCNC: 83 MG/DL — SIGNIFICANT CHANGE UP (ref 70–99)
GLUCOSE SERPL-MCNC: 91 MG/DL — SIGNIFICANT CHANGE UP (ref 70–99)
GLUCOSE SERPL-MCNC: 96 MG/DL — SIGNIFICANT CHANGE UP (ref 70–99)
GLUCOSE UR QL: NEGATIVE — SIGNIFICANT CHANGE UP
HCT VFR BLD CALC: 28.9 % — LOW (ref 39–50)
HCT VFR BLD CALC: 32.6 % — LOW (ref 39–50)
HGB BLD-MCNC: 10.3 G/DL — LOW (ref 13–17)
HGB BLD-MCNC: 8.7 G/DL — LOW (ref 13–17)
INR BLD: 0.94 RATIO — SIGNIFICANT CHANGE UP (ref 0.88–1.16)
KETONES UR-MCNC: NEGATIVE — SIGNIFICANT CHANGE UP
LEUKOCYTE ESTERASE UR-ACNC: NEGATIVE — SIGNIFICANT CHANGE UP
MAGNESIUM SERPL-MCNC: 1.6 MG/DL — SIGNIFICANT CHANGE UP (ref 1.6–2.6)
MAGNESIUM SERPL-MCNC: 1.7 MG/DL — SIGNIFICANT CHANGE UP (ref 1.6–2.6)
MCHC RBC-ENTMCNC: 30.1 GM/DL — LOW (ref 32–36)
MCHC RBC-ENTMCNC: 30.6 PG — SIGNIFICANT CHANGE UP (ref 27–34)
MCHC RBC-ENTMCNC: 31.2 PG — SIGNIFICANT CHANGE UP (ref 27–34)
MCHC RBC-ENTMCNC: 31.6 GM/DL — LOW (ref 32–36)
MCV RBC AUTO: 103.6 FL — HIGH (ref 80–100)
MCV RBC AUTO: 96.7 FL — SIGNIFICANT CHANGE UP (ref 80–100)
NITRITE UR-MCNC: NEGATIVE — SIGNIFICANT CHANGE UP
NRBC # BLD: 0 /100 WBCS — SIGNIFICANT CHANGE UP
NRBC # BLD: 0 /100 WBCS — SIGNIFICANT CHANGE UP
NRBC # FLD: 0 K/UL — SIGNIFICANT CHANGE UP
NRBC # FLD: 0 K/UL — SIGNIFICANT CHANGE UP
PH UR: 6.5 — SIGNIFICANT CHANGE UP (ref 5–8)
PHOSPHATE SERPL-MCNC: 2.9 MG/DL — SIGNIFICANT CHANGE UP (ref 2.5–4.5)
PHOSPHATE SERPL-MCNC: 3.4 MG/DL — SIGNIFICANT CHANGE UP (ref 2.5–4.5)
PLATELET # BLD AUTO: 217 K/UL — SIGNIFICANT CHANGE UP (ref 150–400)
PLATELET # BLD AUTO: 245 K/UL — SIGNIFICANT CHANGE UP (ref 150–400)
POTASSIUM BLDV-SCNC: 5.2 MMOL/L — HIGH (ref 3.5–5.1)
POTASSIUM SERPL-MCNC: 4.8 MMOL/L — SIGNIFICANT CHANGE UP (ref 3.5–5.3)
POTASSIUM SERPL-MCNC: 5.2 MMOL/L — SIGNIFICANT CHANGE UP (ref 3.5–5.3)
POTASSIUM SERPL-MCNC: 5.4 MMOL/L — HIGH (ref 3.5–5.3)
POTASSIUM SERPL-SCNC: 4.8 MMOL/L — SIGNIFICANT CHANGE UP (ref 3.5–5.3)
POTASSIUM SERPL-SCNC: 5.2 MMOL/L — SIGNIFICANT CHANGE UP (ref 3.5–5.3)
POTASSIUM SERPL-SCNC: 5.4 MMOL/L — HIGH (ref 3.5–5.3)
PROT ?TM UR-MCNC: 8 MG/DL — SIGNIFICANT CHANGE UP
PROT UR-MCNC: NEGATIVE — SIGNIFICANT CHANGE UP
PROT/CREAT UR-RTO: 0.2 RATIO — SIGNIFICANT CHANGE UP (ref 0–0.2)
PROTHROM AB SERPL-ACNC: 10.9 SEC — SIGNIFICANT CHANGE UP (ref 10.5–13.4)
RBC # BLD: 2.79 M/UL — LOW (ref 4.2–5.8)
RBC # BLD: 3.37 M/UL — LOW (ref 4.2–5.8)
RBC # FLD: 15.9 % — HIGH (ref 10.3–14.5)
RBC # FLD: 19.9 % — HIGH (ref 10.3–14.5)
RH IG SCN BLD-IMP: POSITIVE — SIGNIFICANT CHANGE UP
SODIUM SERPL-SCNC: 139 MMOL/L — SIGNIFICANT CHANGE UP (ref 135–145)
SODIUM SERPL-SCNC: 139 MMOL/L — SIGNIFICANT CHANGE UP (ref 135–145)
SODIUM SERPL-SCNC: 140 MMOL/L — SIGNIFICANT CHANGE UP (ref 135–145)
SP GR SPEC: 1.01 — SIGNIFICANT CHANGE UP (ref 1–1.05)
UROBILINOGEN FLD QL: SIGNIFICANT CHANGE UP
WBC # BLD: 14.62 K/UL — HIGH (ref 3.8–10.5)
WBC # BLD: 8.01 K/UL — SIGNIFICANT CHANGE UP (ref 3.8–10.5)
WBC # FLD AUTO: 14.62 K/UL — HIGH (ref 3.8–10.5)
WBC # FLD AUTO: 8.01 K/UL — SIGNIFICANT CHANGE UP (ref 3.8–10.5)

## 2022-07-20 PROCEDURE — 48548 FUSE PANCREAS AND BOWEL: CPT | Mod: 80

## 2022-07-20 PROCEDURE — 48020 REMOVAL OF PANCREATIC STONE: CPT

## 2022-07-20 PROCEDURE — 44050 REDUCE BOWEL OBSTRUCTION: CPT

## 2022-07-20 PROCEDURE — 88305 TISSUE EXAM BY PATHOLOGIST: CPT | Mod: 26

## 2022-07-20 PROCEDURE — 97607 NEG PRS WND THR NDME<=50SQCM: CPT

## 2022-07-20 PROCEDURE — 99233 SBSQ HOSP IP/OBS HIGH 50: CPT | Mod: GC

## 2022-07-20 PROCEDURE — 93010 ELECTROCARDIOGRAM REPORT: CPT

## 2022-07-20 PROCEDURE — 48548 FUSE PANCREAS AND BOWEL: CPT

## 2022-07-20 PROCEDURE — 88173 CYTOPATH EVAL FNA REPORT: CPT | Mod: 26

## 2022-07-20 PROCEDURE — 88307 TISSUE EXAM BY PATHOLOGIST: CPT | Mod: 26

## 2022-07-20 DEVICE — SURGICEL 2 X 14": Type: IMPLANTABLE DEVICE | Status: FUNCTIONAL

## 2022-07-20 DEVICE — LIGATING CLIPS WECK HORIZON MEDIUM (BLUE) 24: Type: IMPLANTABLE DEVICE | Status: FUNCTIONAL

## 2022-07-20 DEVICE — STAPLER COVIDIEN TRI-STAPLE 60MM PURPLE RELOAD: Type: IMPLANTABLE DEVICE | Status: FUNCTIONAL

## 2022-07-20 DEVICE — LIGATING CLIPS WECK HORIZON LARGE (ORANGE) 24: Type: IMPLANTABLE DEVICE | Status: FUNCTIONAL

## 2022-07-20 RX ORDER — METOPROLOL TARTRATE 50 MG
5 TABLET ORAL
Refills: 0 | Status: COMPLETED | OUTPATIENT
Start: 2022-07-20 | End: 2022-07-20

## 2022-07-20 RX ORDER — NALOXONE HYDROCHLORIDE 4 MG/.1ML
0.1 SPRAY NASAL
Refills: 0 | Status: DISCONTINUED | OUTPATIENT
Start: 2022-07-20 | End: 2022-07-21

## 2022-07-20 RX ORDER — HYDROMORPHONE HYDROCHLORIDE 2 MG/ML
250 INJECTION INTRAMUSCULAR; INTRAVENOUS; SUBCUTANEOUS
Refills: 0 | Status: DISCONTINUED | OUTPATIENT
Start: 2022-07-20 | End: 2022-07-20

## 2022-07-20 RX ORDER — HYDROMORPHONE HYDROCHLORIDE 2 MG/ML
0.5 INJECTION INTRAMUSCULAR; INTRAVENOUS; SUBCUTANEOUS
Refills: 0 | Status: DISCONTINUED | OUTPATIENT
Start: 2022-07-20 | End: 2022-07-20

## 2022-07-20 RX ORDER — HYDRALAZINE HCL 50 MG
10 TABLET ORAL ONCE
Refills: 0 | Status: COMPLETED | OUTPATIENT
Start: 2022-07-20 | End: 2022-07-20

## 2022-07-20 RX ORDER — ALBUMIN HUMAN 25 %
250 VIAL (ML) INTRAVENOUS ONCE
Refills: 0 | Status: COMPLETED | OUTPATIENT
Start: 2022-07-20 | End: 2022-07-20

## 2022-07-20 RX ORDER — ACETAMINOPHEN 500 MG
1000 TABLET ORAL ONCE
Refills: 0 | Status: COMPLETED | OUTPATIENT
Start: 2022-07-21 | End: 2022-07-21

## 2022-07-20 RX ORDER — HYDROMORPHONE HYDROCHLORIDE 2 MG/ML
250 INJECTION INTRAMUSCULAR; INTRAVENOUS; SUBCUTANEOUS
Refills: 0 | Status: DISCONTINUED | OUTPATIENT
Start: 2022-07-20 | End: 2022-07-21

## 2022-07-20 RX ORDER — FAMOTIDINE 10 MG/ML
40 INJECTION INTRAVENOUS ONCE
Refills: 0 | Status: DISCONTINUED | OUTPATIENT
Start: 2022-07-20 | End: 2022-07-25

## 2022-07-20 RX ORDER — FENTANYL CITRATE 50 UG/ML
50 INJECTION INTRAVENOUS
Refills: 0 | Status: DISCONTINUED | OUTPATIENT
Start: 2022-07-20 | End: 2022-07-20

## 2022-07-20 RX ORDER — ALBUMIN HUMAN 25 %
250 VIAL (ML) INTRAVENOUS
Refills: 0 | Status: DISCONTINUED | OUTPATIENT
Start: 2022-07-20 | End: 2022-07-20

## 2022-07-20 RX ORDER — ONDANSETRON 8 MG/1
4 TABLET, FILM COATED ORAL EVERY 6 HOURS
Refills: 0 | Status: DISCONTINUED | OUTPATIENT
Start: 2022-07-20 | End: 2022-07-21

## 2022-07-20 RX ORDER — ACETAMINOPHEN 500 MG
1000 TABLET ORAL ONCE
Refills: 0 | Status: COMPLETED | OUTPATIENT
Start: 2022-07-20 | End: 2022-07-20

## 2022-07-20 RX ORDER — HYDRALAZINE HCL 50 MG
10 TABLET ORAL ONCE
Refills: 0 | Status: DISCONTINUED | OUTPATIENT
Start: 2022-07-20 | End: 2022-07-23

## 2022-07-20 RX ORDER — SODIUM CHLORIDE 9 MG/ML
1000 INJECTION, SOLUTION INTRAVENOUS
Refills: 0 | Status: DISCONTINUED | OUTPATIENT
Start: 2022-07-20 | End: 2022-07-21

## 2022-07-20 RX ORDER — METOPROLOL TARTRATE 50 MG
5 TABLET ORAL ONCE
Refills: 0 | Status: COMPLETED | OUTPATIENT
Start: 2022-07-20 | End: 2022-07-20

## 2022-07-20 RX ORDER — ONDANSETRON 8 MG/1
4 TABLET, FILM COATED ORAL ONCE
Refills: 0 | Status: DISCONTINUED | OUTPATIENT
Start: 2022-07-20 | End: 2022-07-20

## 2022-07-20 RX ORDER — PANTOPRAZOLE SODIUM 20 MG/1
40 TABLET, DELAYED RELEASE ORAL DAILY
Refills: 0 | Status: DISCONTINUED | OUTPATIENT
Start: 2022-07-20 | End: 2022-07-25

## 2022-07-20 RX ADMIN — OXYCODONE HYDROCHLORIDE 15 MILLIGRAM(S): 5 TABLET ORAL at 00:20

## 2022-07-20 RX ADMIN — HYDROMORPHONE HYDROCHLORIDE 250 MILLILITER(S): 2 INJECTION INTRAMUSCULAR; INTRAVENOUS; SUBCUTANEOUS at 17:57

## 2022-07-20 RX ADMIN — OXYCODONE HYDROCHLORIDE 15 MILLIGRAM(S): 5 TABLET ORAL at 05:23

## 2022-07-20 RX ADMIN — HYDROMORPHONE HYDROCHLORIDE 250 MILLILITER(S): 2 INJECTION INTRAMUSCULAR; INTRAVENOUS; SUBCUTANEOUS at 22:43

## 2022-07-20 RX ADMIN — Medication 1300 MILLIGRAM(S): at 00:21

## 2022-07-20 RX ADMIN — SODIUM CHLORIDE 75 MILLILITER(S): 9 INJECTION, SOLUTION INTRAVENOUS at 00:22

## 2022-07-20 RX ADMIN — OXYCODONE HYDROCHLORIDE 15 MILLIGRAM(S): 5 TABLET ORAL at 04:47

## 2022-07-20 RX ADMIN — HYDROMORPHONE HYDROCHLORIDE 0.5 MILLIGRAM(S): 2 INJECTION INTRAMUSCULAR; INTRAVENOUS; SUBCUTANEOUS at 18:25

## 2022-07-20 RX ADMIN — SENNA PLUS 1 TABLET(S): 8.6 TABLET ORAL at 00:21

## 2022-07-20 RX ADMIN — OXYCODONE HYDROCHLORIDE 15 MILLIGRAM(S): 5 TABLET ORAL at 09:37

## 2022-07-20 RX ADMIN — Medication 500 MILLILITER(S): at 20:36

## 2022-07-20 RX ADMIN — HYDROMORPHONE HYDROCHLORIDE 250 MILLILITER(S): 2 INJECTION INTRAMUSCULAR; INTRAVENOUS; SUBCUTANEOUS at 22:35

## 2022-07-20 RX ADMIN — Medication 5 MILLIGRAM(S): at 18:21

## 2022-07-20 RX ADMIN — Medication 40 MILLIGRAM(S): at 06:23

## 2022-07-20 RX ADMIN — Medication 5 MILLIGRAM(S): at 20:01

## 2022-07-20 RX ADMIN — HYDROMORPHONE HYDROCHLORIDE 0.5 MILLIGRAM(S): 2 INJECTION INTRAMUSCULAR; INTRAVENOUS; SUBCUTANEOUS at 18:10

## 2022-07-20 RX ADMIN — Medication 5 MILLIGRAM(S): at 19:08

## 2022-07-20 RX ADMIN — Medication 1300 MILLIGRAM(S): at 06:21

## 2022-07-20 RX ADMIN — CYCLOBENZAPRINE HYDROCHLORIDE 5 MILLIGRAM(S): 10 TABLET, FILM COATED ORAL at 06:21

## 2022-07-20 RX ADMIN — Medication 10 MILLIGRAM(S): at 21:59

## 2022-07-20 RX ADMIN — CYCLOBENZAPRINE HYDROCHLORIDE 5 MILLIGRAM(S): 10 TABLET, FILM COATED ORAL at 00:20

## 2022-07-20 RX ADMIN — SODIUM CHLORIDE 100 MILLILITER(S): 9 INJECTION, SOLUTION INTRAVENOUS at 19:30

## 2022-07-20 RX ADMIN — Medication 1000 MILLIGRAM(S): at 22:45

## 2022-07-20 RX ADMIN — HYDROMORPHONE HYDROCHLORIDE 250 MILLILITER(S): 2 INJECTION INTRAMUSCULAR; INTRAVENOUS; SUBCUTANEOUS at 19:40

## 2022-07-20 RX ADMIN — OXYCODONE HYDROCHLORIDE 15 MILLIGRAM(S): 5 TABLET ORAL at 01:07

## 2022-07-20 RX ADMIN — SODIUM ZIRCONIUM CYCLOSILICATE 10 GRAM(S): 10 POWDER, FOR SUSPENSION ORAL at 08:09

## 2022-07-20 RX ADMIN — Medication 5 MILLIGRAM(S): at 20:06

## 2022-07-20 RX ADMIN — HYDROMORPHONE HYDROCHLORIDE 250 MILLILITER(S): 2 INJECTION INTRAMUSCULAR; INTRAVENOUS; SUBCUTANEOUS at 23:04

## 2022-07-20 RX ADMIN — HEPARIN SODIUM 5000 UNIT(S): 5000 INJECTION INTRAVENOUS; SUBCUTANEOUS at 21:00

## 2022-07-20 RX ADMIN — Medication 5 MILLIGRAM(S): at 21:52

## 2022-07-20 RX ADMIN — HEPARIN SODIUM 5000 UNIT(S): 5000 INJECTION INTRAVENOUS; SUBCUTANEOUS at 06:21

## 2022-07-20 RX ADMIN — HYDROMORPHONE HYDROCHLORIDE 0.5 MILLIGRAM(S): 2 INJECTION INTRAMUSCULAR; INTRAVENOUS; SUBCUTANEOUS at 17:40

## 2022-07-20 RX ADMIN — Medication 400 MILLIGRAM(S): at 22:40

## 2022-07-20 RX ADMIN — ONDANSETRON 4 MILLIGRAM(S): 8 TABLET, FILM COATED ORAL at 22:34

## 2022-07-20 RX ADMIN — PANTOPRAZOLE SODIUM 40 MILLIGRAM(S): 20 TABLET, DELAYED RELEASE ORAL at 06:21

## 2022-07-20 NOTE — CONSULT NOTE ADULT - SUBJECTIVE AND OBJECTIVE BOX
HISTORY OF PRESENT ILLNESS:  61M with PMHx of chronic pancreatitis (2/2 alcohol abuse, c/b chronic pain), CKD4, hep C s/p SVR and benign esophageal stricture (post-stent 4/19/22) presents with epigastric pain. Pt was in the hospital undergoing management for chronic pancreatitis and on 7/20 pt underwent Peustow procedure.     Intraop details:  1.3L crystalloid  500cc albumin 5%  280cc UOP  50cc EBL    PAST MEDICAL HISTORY:   ETOH abuse  Pancreatitis  Hepatitis C  Gout  HTN (hypertension)  Chronic kidney disease (CKD)  H/O chronic pancreatitis  Gout  Alcohol abuse      PAST SURGICAL HISTORY:   No significant past surgical history  Gastric perforation      FAMILY HISTORY: No pertinent family history in first degree relatives    FH: HTN (hypertension)    FH: hypertension (Father)        SOCIAL HISTORY:    CODE STATUS:     HOME MEDICATIONS:    ALLERGIES: No Known Allergies      VITAL SIGNS:  ICU Vital Signs Last 24 Hrs  T(C): 36.4 (20 Jul 2022 17:25), Max: 37 (20 Jul 2022 04:44)  T(F): 97.5 (20 Jul 2022 17:25), Max: 98.6 (20 Jul 2022 04:44)  HR: 114 (20 Jul 2022 17:30) (81 - 116)  BP: 138/74 (20 Jul 2022 17:30) (133/69 - 157/79)  BP(mean): 88 (20 Jul 2022 17:30) (85 - 88)  ABP: 96/72 (20 Jul 2022 17:30) (96/72 - 96/72)  ABP(mean): --  RR: 13 (20 Jul 2022 17:30) (13 - 18)  SpO2: 99% (20 Jul 2022 17:30) (96% - 100%)    O2 Parameters below as of 20 Jul 2022 17:30  Patient On (Oxygen Delivery Method): nasal cannula  O2 Flow (L/min): 2          NEURO  Exam: somewhat sleepy but responsive to questions. Reporting pain  Meds:acetaminophen   IVPB .. 1000 milliGRAM(s) IV Intermittent once  fentaNYL    Injectable 50 MICROGram(s) IV Push every 10 minutes PRN Severe Pain (7 - 10)  HYDROmorphone  Injectable 0.5 milliGRAM(s) IV Push every 10 minutes PRN Moderate Pain (4 - 6)  hydromorphone (10 MICROgram(s)/mL) + bupivacaine 0.0625% in 0.9% Sodium Chloride PCEA 250 milliLiter(s) Epidural PCA Continuous  ondansetron Injectable 4 milliGRAM(s) IV Push every 6 hours PRN Nausea  ondansetron Injectable 4 milliGRAM(s) IV Push once PRN Nausea and/or Vomiting      RESPIRATORY  Exam: breathing comfortable on RA  Meds:    CARDIOVASCULAR  Cardiac Rhythm: RRR  Meds:    GI/NUTRITION  Exam: soft, appropriately tender. Midline prevena vac holding suction  Diet: NPO  Meds:famotidine Injectable 40 milliGRAM(s) IV Push once  pantoprazole  Injectable 40 milliGRAM(s) IV Push daily      GENITOURINARY/RENAL  Meds:lactated ringers. 1000 milliLiter(s) IV Continuous <Continuous>  sodium bicarbonate 1300 milliGRAM(s) Oral three times a day      07-19 @ 07:01  -  07-20 @ 07:00  --------------------------------------------------------  IN:    Lactated Ringers: 525 mL    Oral Fluid: 710 mL  Total IN: 1235 mL    OUT:    Voided (mL): 1700 mL  Total OUT: 1700 mL    Total NET: -465 mL      07-20 @ 07:01  -  07-20 @ 17:54  --------------------------------------------------------  IN:    Lactated Ringers: 150 mL    PRBCs (Packed Red Blood Cells): 100 mL  Total IN: 250 mL    OUT:    Voided (mL): 400 mL  Total OUT: 400 mL    Total NET: -150 mL        Weight (kg): 72 (07-20 @ 10:13)  07-20    139  |  107  |  51<H>  ----------------------------<  83  5.4<H>   |  20<L>  |  2.35<H>    Ca    8.6      20 Jul 2022 06:15  Phos  3.4     07-20  Mg     1.70     07-20    TPro  6.0  /  Alb  3.5  /  TBili  <0.2  /  DBili  x   /  AST  9   /  ALT  7   /  AlkPhos  98  07-19    [X] Kenney catheter, indication: urine output monitoring in critically ill patient    HEMATOLOGIC  [ ] VTE Prophylaxis:  heparin   Injectable 5000 Unit(s) SubCutaneous every 12 hours                          8.7    8.01  )-----------( 245      ( 20 Jul 2022 00:24 )             28.9     PT/INR - ( 20 Jul 2022 00:24 )   PT: 10.9 sec;   INR: 0.94 ratio           Transfusion: [ ] PRBC	[ ] Platelets	[ ] FFP	[ ] Cryoprecipitate      INFECTIOUS DISEASES  Meds:  RECENT CULTURES:  Specimen Source: .Stool Feces  Date/Time: 07-18 @ 17:52  Culture Results:   Testing in progress  Gram Stain: --  Organism: --      ENDOCRINE  Meds:  CAPILLARY BLOOD GLUCOSE      POCT Blood Glucose.: 135 mg/dL (19 Jul 2022 23:03)  POCT Blood Glucose.: 97 mg/dL (19 Jul 2022 21:42)      PATIENT CARE ACCESS DEVICES:  [X] Peripheral IV  [ ] Central Venous Line	[ ] R	[ ] L	[ ] IJ	[ ] Fem	[ ] SC	Placed:   [ ] Arterial Line		[ ] R	[ ] L	[ ] Fem	[ ] Rad	[ ] Ax	Placed:   [ ] PICC:					[ ] Mediport  [ ] Urinary Catheter, Date Placed:   [x] Necessity of urinary, arterial, and venous catheters discussed    OTHER MEDICATIONS: naloxone Injectable 0.1 milliGRAM(s) IV Push every 3 minutes PRN  sodium zirconium cyclosilicate 10 Gram(s) Oral three times a day

## 2022-07-20 NOTE — CONSULT NOTE ADULT - ATTENDING COMMENTS
Patient s/p peustow procedure doing well. Patient on room air, pcea, hemodynamically stable.  Patient floor eligible without critical care needs    I have personally interviewed when possible and examined the patient, reviewed data and laboratory tests/x-rays and all pertinent electronic images.  I was physically present for the key portions of the evaluation and management (E/M) service provided.   The SICU team has a constant risk benefit analyzes discussion with the primary team, all consultants, House Staff and PA's on all decisions.  These diagnoses are unrelated to the surgical procedure noted above.  I meet with family if needed to get further history, discuss the case and make care decisions for this patient who might not be able to participate.  Time involved in performance of separately billable procedures was not counted toward my critical care time. There is no overlap.  I spent 30 minutes ( 0800Hrs-0915Hrs in AM/ 1600Hrs-1715Hrs in PM, or other time indicated) of critical care time for the diagnoses, assessment, plan and interventions.  This time excludes time spent on separate procedures and teaching.
61 year old male with chronic pancreatitis secondary to ETOH. Pt has an esophageal stricture with stent in place and no dysphagia. Pt has acute on chronic CKD. Scans show dilated pancreatic duct with stones in the duct. Pt complains of abdominal pain.     Plan: Please consult Dr. Trejo from pancreatic surgery for possible Peustow procedure.
Hyperkalemia  CKD 4    Low K diet, avoid nephrotoxic agents. Cont bicarb repletion bid. Lokelma.     Will monitor with you.

## 2022-07-20 NOTE — CHART NOTE - NSCHARTNOTEFT_GEN_A_CORE
Preop Dx: Chronic pancreatitis  Surgeon: Dr Pathak, Dr Trejo  Procedure:    Vital Signs Last 24 Hrs  T(C): 36.6 (20 Jul 2022 10:13), Max: 37 (19 Jul 2022 11:19)  T(F): 97.9 (20 Jul 2022 10:13), Max: 98.6 (19 Jul 2022 11:19)  HR: 85 (20 Jul 2022 10:13) (81 - 98)  BP: 144/78 (20 Jul 2022 10:13) (124/57 - 148/78)  BP(mean): --  RR: 16 (20 Jul 2022 10:13) (16 - 18)  SpO2: 99% (20 Jul 2022 10:13) (98% - 100%)    Parameters below as of 20 Jul 2022 04:44  Patient On (Oxygen Delivery Method): room air                            8.7    8.01  )-----------( 245      ( 20 Jul 2022 00:24 )             28.9     07-20    139  |  107  |  51<H>  ----------------------------<  83  5.4<H>   |  20<L>  |  2.35<H>    Ca    8.6      20 Jul 2022 06:15  Phos  3.4     07-20  Mg     1.70     07-20    TPro  6.0  /  Alb  3.5  /  TBili  <0.2  /  DBili  x   /  AST  9   /  ALT  7   /  AlkPhos  98  07-19    PT/INR - ( 20 Jul 2022 00:24 )   PT: 10.9 sec;   INR: 0.94 ratio           Daily Height in cm: 185.4 (20 Jul 2022 10:13)    Daily     TTE, EKG, type and screen performed        A/P: 61M with PMHx of ETOH abuse, chronic pancreatitis with chronic PD dilation, PD stone and calcifications, perforated gastric ulcer s/p exlap with David patch repair (2019, Dr. Murray), CKD4, hep C s/p SVR and benign esophageal stricture (post-stent 4/19/22 c/b need for CRE balloon dilation, reposition and suture on 4/29) presents with a flare of chronic pancreatitis.     - OR for Puestow Procedure secondary to chronic pancreatitis   - NPO past midnight, except medications  - IVF while NPO  - 1 unit PRBC  - F/U hyperkalemia and treatment plan per nephrology  - Will consent patient once OR time is determined  - Medical and cardiology optimization documented

## 2022-07-20 NOTE — PROGRESS NOTE ADULT - SUBJECTIVE AND OBJECTIVE BOX
Nephrology progress note    Patient is a 61y Male with PMHx of chronic pancreatitis (complicated by chronic pain of which secondary to alcohol abuse), CKD4, hep C s/p SVR and benign esophageal stricture (post-stent 22 c/b need for CRE balloon dilation, reposition and suture on ) presents with epigastric pain. Planned for Puestow procedure. Course complicated by persistent hyperkalemia.     Subjective:  No complaints overnight. Still experiencing abdominal pain, but does not affect ability to eat or drink. Pt feels he has been urinating more since starting lasix.     Allergies:  No Known Allergies    Hospital Medications:   MEDICATIONS  (STANDING):  ascorbic acid 500 milliGRAM(s) Oral daily  cyclobenzaprine 5 milliGRAM(s) Oral three times a day  ferrous    sulfate 325 milliGRAM(s) Oral daily  furosemide    Tablet 40 milliGRAM(s) Oral daily  heparin   Injectable 5000 Unit(s) SubCutaneous every 12 hours  lactated ringers. 1000 milliLiter(s) (75 mL/Hr) IV Continuous <Continuous>  pantoprazole    Tablet 40 milliGRAM(s) Oral before breakfast  polyethylene glycol 3350 17 Gram(s) Oral daily  senna 1 Tablet(s) Oral at bedtime  sodium bicarbonate 1300 milliGRAM(s) Oral three times a day  sodium zirconium cyclosilicate 10 Gram(s) Oral three times a day    MEDICATIONS  (PRN):  aluminum hydroxide/magnesium hydroxide/simethicone Suspension 30 milliLiter(s) Oral every 4 hours PRN Dyspepsia  melatonin 3 milliGRAM(s) Oral at bedtime PRN Insomnia  ondansetron Injectable 4 milliGRAM(s) IV Push every 8 hours PRN Nausea and/or Vomiting  oxyCODONE    IR 15 milliGRAM(s) Oral every 4 hours PRN Severe Pain (7 - 10)    REVIEW OF SYSTEMS:  CONSTITUTIONAL: No weakness, fevers or chills  EYES/ENT: No visual changes;  No vertigo or throat pain   NECK: No pain or stiffness  RESPIRATORY: No cough, wheezing, hemoptysis; No shortness of breath  CARDIOVASCULAR: No chest pain or palpitations.  GASTROINTESTINAL: + abdominal pain. No nausea, vomiting, or hematemesis; No diarrhea or constipation. No melena or hematochezia.  GENITOURINARY: No dysuria, frequency, foamy urine, urinary urgency, incontinence or hematuria  NEUROLOGICAL: No numbness or weakness  SKIN: No itching, burning, rashes, or lesions   VASCULAR: No bilateral lower extremity edema.   All other review of systems is negative unless indicated above.    VITALS:  Vital Signs Last 24 Hrs  T(C): 36.6 (2022 10:51), Max: 37 (2022 11:19)  T(F): 97.8 (2022 10:51), Max: 98.6 (2022 11:19)  HR: 83 (2022 10:) (81 - 98)  BP: 157/79 (2022 10:51) (124/57 - 157/79)  BP(mean): --  RR: 16 (2022 10:) (16 - 18)  SpO2: 96% (2022 10:) (96% - 100%)    Parameters below as of 2022 04:44  Patient On (Oxygen Delivery Method): room air         @ 07:  -   @ 07:00  --------------------------------------------------------  IN: 1110 mL / OUT: 1500 mL / NET: -390 mL     @ 07:  -  20 @ 07:00  --------------------------------------------------------  IN: 1235 mL / OUT: 1700 mL / NET: -465 mL    0720 @ 07:01  -   @ 11:09  --------------------------------------------------------  IN: 250 mL / OUT: 400 mL / NET: -150 mL      Height (cm): 185.4 ( @ 10:13)  Weight (kg): 72 ( @ 10:13)  BMI (kg/m2): 20.9 ( @ 10:13)  BSA (m2): 1.95 ( @ 10:13)    PHYSICAL EXAM:  Constitutional: NAD  HEENT: anicteric sclera, oropharynx clear, MMM  Neck: No JVD  Respiratory: CTAB, no wheezes, rales or rhonchi  Cardiovascular: S1, S2, RRR  Gastrointestinal: BS+, soft, NT/ND  Extremities: No cyanosis or clubbing. 2+ edema in ankles B/L  Neurological: A/O x 3, no focal deficits  : No CVA tenderness. No chen.   Skin: No rashes  Vascular Access:    LABS:      139  |  107  |  51<H>  ----------------------------<  83  5.4<H>   |  20<L>  |  2.35<H>    Ca    8.6      2022 06:15  Phos  3.4       Mg     1.70         TPro  6.0  /  Alb  3.5  /  TBili  <0.2  /  DBili      /  AST  9   /  ALT  7   /  AlkPhos  98                            8.7    8.01  )-----------( 245      ( 2022 00:24 )             28.9       Urine Studies:  Urinalysis Basic - ( 2022 08:00 )    Color: Light Yellow / Appearance: Clear / S.015 / pH:   Gluc:  / Ketone: Negative  / Bili: Negative / Urobili: <2 mg/dL   Blood:  / Protein: Negative / Nitrite: Negative   Leuk Esterase: Negative / RBC:  / WBC    Sq Epi:  / Non Sq Epi:  / Bacteria:       Creatinine, Random Urine: 52 mg/dL ( @ 08:00)  Protein/Creatinine Ratio Calculation: 0.2 Ratio ( @ 08:00)    RADIOLOGY & ADDITIONAL STUDIES:

## 2022-07-20 NOTE — BRIEF OPERATIVE NOTE - OPERATION/FINDINGS
ex. lap, lesser sac entered, pancreatic duct identified using US guidance, 16G needle inserted and duct opened laterally, jejunum divided and brought through colonic mesenteric defected, lateral pancreaticojejunostomy performed, distal glendy limb sewen side to side for JJ anastomosis, hemostasis achieved   19Fr mitesh drain near PJ

## 2022-07-20 NOTE — PROGRESS NOTE ADULT - SUBJECTIVE AND OBJECTIVE BOX
HISTORY OF PRESENT ILLNESS:  61M with PMHx of chronic pancreatitis (2/2 alcohol abuse, c/b chronic pain), CKD4, hep C s/p SVR and benign esophageal stricture (post-stent 4/19/22) presents with epigastric pain. Pt was in the hospital undergoing management for chronic pancreatitis and on 7/20 pt underwent Peustow procedure.     Intraop details:  1.3L crystalloid  500cc albumin 5%  280cc UOP  50cc EBL    PAST MEDICAL HISTORY:   ETOH abuse  Pancreatitis  Hepatitis C  Gout  HTN (hypertension)  Chronic kidney disease (CKD)  H/O chronic pancreatitis  Gout  Alcohol abuse      PAST SURGICAL HISTORY:   No significant past surgical history  Gastric perforation      FAMILY HISTORY: No pertinent family history in first degree relatives    FH: HTN (hypertension)    FH: hypertension (Father)        SOCIAL HISTORY:    CODE STATUS:     HOME MEDICATIONS:    ALLERGIES: No Known Allergies      VITAL SIGNS:  ICU Vital Signs Last 24 Hrs  T(C): 36.4 (20 Jul 2022 17:25), Max: 37 (20 Jul 2022 04:44)  T(F): 97.5 (20 Jul 2022 17:25), Max: 98.6 (20 Jul 2022 04:44)  HR: 114 (20 Jul 2022 17:30) (81 - 116)  BP: 138/74 (20 Jul 2022 17:30) (133/69 - 157/79)  BP(mean): 88 (20 Jul 2022 17:30) (85 - 88)  ABP: 96/72 (20 Jul 2022 17:30) (96/72 - 96/72)  ABP(mean): --  RR: 13 (20 Jul 2022 17:30) (13 - 18)  SpO2: 99% (20 Jul 2022 17:30) (96% - 100%)    O2 Parameters below as of 20 Jul 2022 17:30  Patient On (Oxygen Delivery Method): nasal cannula  O2 Flow (L/min): 2          NEURO  Exam: somewhat sleepy but responsive to questions. Reporting pain  Meds:acetaminophen   IVPB .. 1000 milliGRAM(s) IV Intermittent once  fentaNYL    Injectable 50 MICROGram(s) IV Push every 10 minutes PRN Severe Pain (7 - 10)  HYDROmorphone  Injectable 0.5 milliGRAM(s) IV Push every 10 minutes PRN Moderate Pain (4 - 6)  hydromorphone (10 MICROgram(s)/mL) + bupivacaine 0.0625% in 0.9% Sodium Chloride PCEA 250 milliLiter(s) Epidural PCA Continuous  ondansetron Injectable 4 milliGRAM(s) IV Push every 6 hours PRN Nausea  ondansetron Injectable 4 milliGRAM(s) IV Push once PRN Nausea and/or Vomiting      RESPIRATORY  Exam: breathing comfortable on RA  Meds:    CARDIOVASCULAR  Cardiac Rhythm: RRR  Meds:    GI/NUTRITION  Exam: soft, appropriately tender. Midline prevena vac holding suction  Diet: NPO  Meds:famotidine Injectable 40 milliGRAM(s) IV Push once  pantoprazole  Injectable 40 milliGRAM(s) IV Push daily      GENITOURINARY/RENAL  Meds:lactated ringers. 1000 milliLiter(s) IV Continuous <Continuous>  sodium bicarbonate 1300 milliGRAM(s) Oral three times a day      07-19 @ 07:01  -  07-20 @ 07:00  --------------------------------------------------------  IN:    Lactated Ringers: 525 mL    Oral Fluid: 710 mL  Total IN: 1235 mL    OUT:    Voided (mL): 1700 mL  Total OUT: 1700 mL    Total NET: -465 mL      07-20 @ 07:01  -  07-20 @ 17:54  --------------------------------------------------------  IN:    Lactated Ringers: 150 mL    PRBCs (Packed Red Blood Cells): 100 mL  Total IN: 250 mL    OUT:    Voided (mL): 400 mL  Total OUT: 400 mL    Total NET: -150 mL        Weight (kg): 72 (07-20 @ 10:13)  07-20    139  |  107  |  51<H>  ----------------------------<  83  5.4<H>   |  20<L>  |  2.35<H>    Ca    8.6      20 Jul 2022 06:15  Phos  3.4     07-20  Mg     1.70     07-20    TPro  6.0  /  Alb  3.5  /  TBili  <0.2  /  DBili  x   /  AST  9   /  ALT  7   /  AlkPhos  98  07-19    [X] Kenney catheter, indication: urine output monitoring in critically ill patient    HEMATOLOGIC  [ ] VTE Prophylaxis:  heparin   Injectable 5000 Unit(s) SubCutaneous every 12 hours                          8.7    8.01  )-----------( 245      ( 20 Jul 2022 00:24 )             28.9     PT/INR - ( 20 Jul 2022 00:24 )   PT: 10.9 sec;   INR: 0.94 ratio           Transfusion: [ ] PRBC	[ ] Platelets	[ ] FFP	[ ] Cryoprecipitate      INFECTIOUS DISEASES  Meds:  RECENT CULTURES:  Specimen Source: .Stool Feces  Date/Time: 07-18 @ 17:52  Culture Results:   Testing in progress  Gram Stain: --  Organism: --      ENDOCRINE  Meds:  CAPILLARY BLOOD GLUCOSE      POCT Blood Glucose.: 135 mg/dL (19 Jul 2022 23:03)  POCT Blood Glucose.: 97 mg/dL (19 Jul 2022 21:42)      PATIENT CARE ACCESS DEVICES:  [X] Peripheral IV  [ ] Central Venous Line	[ ] R	[ ] L	[ ] IJ	[ ] Fem	[ ] SC	Placed:   [ ] Arterial Line		[ ] R	[ ] L	[ ] Fem	[ ] Rad	[ ] Ax	Placed:   [ ] PICC:					[ ] Mediport  [ ] Urinary Catheter, Date Placed:   [x] Necessity of urinary, arterial, and venous catheters discussed    OTHER MEDICATIONS: naloxone Injectable 0.1 milliGRAM(s) IV Push every 3 minutes PRN  sodium zirconium cyclosilicate 10 Gram(s) Oral three times a day      IMAGING STUDIES:

## 2022-07-20 NOTE — CONSULT NOTE ADULT - CONSULT REQUESTED DATE/TIME
06-Jul-2022 21:50
14-Jul-2022
06-Jul-2022 14:55
12-Jul-2022 16:42
20-Jul-2022 18:03
18-Jul-2022 13:24

## 2022-07-20 NOTE — CONSULT NOTE ADULT - CONSULT REASON
esophageal stent, PD dil
Eval for Puestow procedure
Epigastric pain
Hyperkalemia, pain management
Preop evaluation
Hyperkalemia

## 2022-07-20 NOTE — PROGRESS NOTE ADULT - PROBLEM SELECTOR PLAN 1
Hyperkalemia likely in setting of CKD. Patient currently asymptomatic and without EKG changes. Continue with temporizing measures. Recommend continuing standing lokelma 10mg TID. Continue low K diet. If K levels persistently elevated can give regular insulin with D50. Continue sodium bicarb tabs to 1300mg TID. Continue PO Lasix 40mg qd for now given persistent hyperkalemia and edema in LE. No indication for dialysis at this time. Avoid ACEi or ARB for BP management. Hyperkalemia likely in setting of CKD. Patient currently asymptomatic and without EKG changes. Continue with temporizing measures. Recommend continuing standing lokelma 10mg TID. Continue low K diet. If K levels persistently elevated can give regular insulin with D50. Continue sodium bicarb tabs to 1300mg TID. Recommend increasing PO Lasix to 40mg BID. No indication for dialysis at this time. Avoid ACEi or ARB for BP management.

## 2022-07-20 NOTE — CHART NOTE - NSCHARTNOTEFT_GEN_A_CORE
Pain service was contacted by D team resident overnight due to worsening pain, unresponsive to PCEA. Patient seen and evaluated at bedside. Pt endorses 10/10 abd pain with minimal relief with demand dose from PCEA. PCEA rate had already been increased to max rate of 8ml/hr.     On exam, pt appears diaphoretic, tachycardic (HR 100s) and hypertensive (SBP 150s). Pt was given 4mL test dose of 2% lidocaine via PCEA. Medication pushed easily without resistance. Sympathectomy was noted with decrease of SBP to 130s. Pt endorsed mild relief with the dose of 2% lidocaine. Suspect PCEA is functioning properly.     Of note, pt has been on 15mg Percocet q4h for the past 2 years. Likely some component of opiate withdrawal. Recommend 1mg rescue dose of IV diluadid and to resume dilaudid q4 PRN pain and continue PCEA as prescribed.     Pain team to follow up in AM.       Elie Sanford  PGY-4, Anesthesiology

## 2022-07-20 NOTE — PROGRESS NOTE ADULT - ASSESSMENT
61M with PMHx of ETOH abuse, chronic pancreatitis with chronic PD dilation, PD stone and calcifications, perforated gastric ulcer s/p exlap with David patch repair (2019, Dr. Murray), CKD4, hep C s/p SVR and benign esophageal stricture (post-stent 4/19/22 c/b need for CRE balloon dilation, reposition and suture on 4/29) presents with a flare of chronic pancreatitis. Surgical oncology consulted to evaluate for Puestow procedure.    Plan/Recommendations:  - Hyperkalemia, continue prn shifting and Lokelma 10 TID   - nephrology following for CKD + hyperkalemia, appreciate recommendations.  - O&P for eval of stool parasites (worms seen in stool by RN)  - OR wednesday  - patient intermediate risk per medicine  - cards: intermediate risk for low-mod risk procedure, no severe AS/MS, no decompensated HF, no ACS, no tachy-jacquelyn arrythmia, no cardiac contraindication to proceed  - continue diet as tolerated 61M with PMHx of ETOH abuse, chronic pancreatitis with chronic PD dilation, PD stone and calcifications, perforated gastric ulcer s/p exlap with David patch repair (2019, Dr. Murray), CKD4, hep C s/p SVR and benign esophageal stricture (post-stent 4/19/22 c/b need for CRE balloon dilation, reposition and suture on 4/29) presents with a flare of chronic pancreatitis. Surgical oncology consulted to evaluate for Puestow procedure.    Plan/Recommendations:  - Potential OR today for Puestow Procedure  - 1 unit PRBC  - Lokelma 10 TID   - Diet: NPO after midnight  - O&P for eval of stool parasites (worms seen in stool by RN)  - Medicine and cards optimized for OR  - Appreciate nephrology recommendations for hyperkalemia and CKD

## 2022-07-20 NOTE — PROGRESS NOTE ADULT - SUBJECTIVE AND OBJECTIVE BOX
TEAM Surgery Progress Note  Patient is a 61y old  Male who presents with a chief complaint of abdominal pain (19 Jul 2022 10:34)      INTERVAL EVENTS: Patient is POD# ***No acute events overnight.  SUBJECTIVE: Patient seen and examined at bedside with surgical team, patient without complaints. Denies fever, chills, CP, SOB nausea, vomiting, abdominal pain.    REVIEW OF SYSTEMS:  Constitutional: No fevers or chills. No malaise or weakness.  EENT: No vision changes. No ear pain. No nasal congestion or rhinitis. No throat pain or dysphagia.  Respiratory: No cough, wheezing, or SOB. No hemoptysis.  Cardiovascular: No chest pain or palpitations.  Gastrointestinal: No abdominal pain. No nausea, vomiting, diarrhea or constipation. No hematochezia. No melena.  Genitourinary: No dysuria, hematuria, or frequency.  Neurologic: No numbness or tingling. No weakness.  Skin: No rashes or pruritus.     OBJECTIVE:    Vital Signs Last 24 Hrs  T(C): 36.6 (20 Jul 2022 00:42), Max: 37 (19 Jul 2022 11:19)  T(F): 97.8 (20 Jul 2022 00:42), Max: 98.6 (19 Jul 2022 11:19)  HR: 98 (20 Jul 2022 00:42) (83 - 98)  BP: 141/68 (20 Jul 2022 00:42) (124/57 - 156/86)  BP(mean): --  RR: 18 (20 Jul 2022 00:42) (16 - 18)  SpO2: 100% (20 Jul 2022 00:42) (98% - 100%)    Parameters below as of 20 Jul 2022 00:42  Patient On (Oxygen Delivery Method): room air    I&O's Detail    18 Jul 2022 07:01  -  19 Jul 2022 07:00  --------------------------------------------------------  IN:    Oral Fluid: 1110 mL  Total IN: 1110 mL    OUT:    Voided (mL): 1500 mL  Total OUT: 1500 mL    Total NET: -390 mL      19 Jul 2022 07:01  -  20 Jul 2022 03:23  --------------------------------------------------------  IN:    Lactated Ringers: 225 mL    Oral Fluid: 610 mL  Total IN: 835 mL    OUT:    Voided (mL): 1540 mL  Total OUT: 1540 mL    Total NET: -705 mL      MEDICATIONS  (STANDING):  ascorbic acid 500 milliGRAM(s) Oral daily  cyclobenzaprine 5 milliGRAM(s) Oral three times a day  ferrous    sulfate 325 milliGRAM(s) Oral daily  furosemide    Tablet 40 milliGRAM(s) Oral daily  heparin   Injectable 5000 Unit(s) SubCutaneous every 12 hours  lactated ringers. 1000 milliLiter(s) (75 mL/Hr) IV Continuous <Continuous>  pantoprazole    Tablet 40 milliGRAM(s) Oral before breakfast  polyethylene glycol 3350 17 Gram(s) Oral daily  senna 1 Tablet(s) Oral at bedtime  sodium bicarbonate 1300 milliGRAM(s) Oral three times a day  sodium zirconium cyclosilicate 10 Gram(s) Oral three times a day    MEDICATIONS  (PRN):  aluminum hydroxide/magnesium hydroxide/simethicone Suspension 30 milliLiter(s) Oral every 4 hours PRN Dyspepsia  melatonin 3 milliGRAM(s) Oral at bedtime PRN Insomnia  ondansetron Injectable 4 milliGRAM(s) IV Push every 8 hours PRN Nausea and/or Vomiting  oxyCODONE    IR 15 milliGRAM(s) Oral every 4 hours PRN Severe Pain (7 - 10)      PHYSICAL EXAM:  Constitutional: A&Ox3, NAD  Respiratory: Unlabored breathing  Abdomen: Soft, nondistended, NTTP. No rebound or guarding.  Extremities: WWP, GAR spontaneously    LABS:                        8.7    8.01  )-----------( 245      ( 20 Jul 2022 00:24 )             28.9     07-20    139  |  107  |  51<H>  ----------------------------<  96  4.8   |  20<L>  |  2.45<H>    Ca    8.9      20 Jul 2022 00:36  Phos  2.9     07-20  Mg     1.60     07-20    TPro  6.0  /  Alb  3.5  /  TBili  <0.2  /  DBili  x   /  AST  9   /  ALT  7   /  AlkPhos  98  07-19    PT/INR - ( 20 Jul 2022 00:24 )   PT: 10.9 sec;   INR: 0.94 ratio           LIVER FUNCTIONS - ( 19 Jul 2022 05:21 )  Alb: 3.5 g/dL / Pro: 6.0 g/dL / ALK PHOS: 98 U/L / ALT: 7 U/L / AST: 9 U/L / GGT: x             ABO Interpretation: B (07-20-22 @ 00:40)      IMAGING:     SUBJECTIVE: Patient seen and examined at bedside with surgical team. Patient is potentially anticipating Puestow procedure today. Denies fever, chills, CP, SOB nausea, vomiting, abdominal pain.      OBJECTIVE:    ICU Vital Signs Last 24 Hrs  T(C): 37 (20 Jul 2022 04:44), Max: 37 (19 Jul 2022 11:19)  T(F): 98.6 (20 Jul 2022 04:44), Max: 98.6 (19 Jul 2022 11:19)  HR: 81 (20 Jul 2022 04:44) (81 - 98)  BP: 148/78 (20 Jul 2022 04:44) (124/57 - 148/78)  BP(mean): --  ABP: --  ABP(mean): --  RR: 18 (20 Jul 2022 04:44) (17 - 18)  SpO2: 100% (20 Jul 2022 04:44) (98% - 100%)    O2 Parameters below as of 20 Jul 2022 04:44  Patient On (Oxygen Delivery Method): room air        I&O's Detail    19 Jul 2022 07:01  -  20 Jul 2022 07:00  --------------------------------------------------------  IN:    Lactated Ringers: 525 mL    Oral Fluid: 710 mL  Total IN: 1235 mL    OUT:    Voided (mL): 1700 mL  Total OUT: 1700 mL    Total NET: -465 mL      20 Jul 2022 07:01  -  20 Jul 2022 09:09  --------------------------------------------------------  IN:  Total IN: 0 mL    OUT:    Voided (mL): 300 mL  Total OUT: 300 mL    Total NET: -300 mL          PHYSICAL EXAM:  Constitutional: A&Ox3, NAD  Respiratory: Unlabored breathing  Abdomen: Soft, nondistended, nontender  Extremities: WWP, GAR spontaneously    LABS:                         8.7    8.01  )-----------( 245      ( 20 Jul 2022 00:24 )             28.9   07-20    139  |  107  |  51<H>  ----------------------------<  83  5.4<H>   |  20<L>  |  2.35<H>    Ca    8.6      20 Jul 2022 06:15  Phos  3.4     07-20  Mg     1.70     07-20    TPro  6.0  /  Alb  3.5  /  TBili  <0.2  /  DBili  x   /  AST  9   /  ALT  7   /  AlkPhos  98  07-19

## 2022-07-20 NOTE — CONSULT NOTE ADULT - ASSESSMENT
61 M with hx of chronic pancreatitis, alcohol abuse, CKD4, hepatitis C, benign esophageal strictures p/w epigastric pain. Plan for Puestow procedure later this week with surgery. Nephrology consulted for hyperkalemia. 
61M with PMHx of ETOH abuse, chronic pancreatitis with chronic PD dilation, PD stone and calcifications, perforated gastric ulcer s/p exlap with David patch repair (2019, Dr. Murray), CKD4, hep C s/p SVR and benign esophageal stricture (post-stent 4/19/22 c/b need for CRE balloon dilation, reposition and suture on 4/29) presents with a flare of chronic pancreatitis. Surgical oncology consulted to evaluate for Puestow procedure.    Plan/Recommendations:  - Dr. Trejo to review case   - Will follow    D/w Dr. Neil Mckeon, PGY3  D Team Surgery   [id number]  
61M with PMHx of chronic pancreatitis (2/2 alcohol abuse, c/b chronic pain), CKD4, hep C s/p SVR and benign esophageal stricture (post-stent 4/19/22) presents with epigastric pain, now s/p Peustow procedure 7/20. SICU consulted for pain management and hyperkalemia. Pt does not have critical care needs at this time, however we will follow in the immediate postop period.    #Hyperkalemia  - Send repeat post-op labs, f/u potassium levels  - Shift with insulin/D50 as needed, follow hyperkalemia protocol    #Pain management:  - PCEA ordered  - Restart home meds when able to take PO    Discussed with SICU attending.    Please call with any questions  [phone]  
 61M with PMHx of chronic pancreatitis (complicated by chronic pain of which secondary to alcohol abuse), CKD4, hep C s/p SVR and benign esophageal stricture (post-stent 4/19/22 c/b need for CRE balloon dilation, reposition and suture on 4/29) presenting for acute-on-chronic epigastric pain for several days.    Impression:  #Chronic pancreatitis with PD dilation and stones seen on MR (4/20/22). Seen outpatient with plan to refer to surgery for evaluation for Puestow procedure  #Esophageal stricture likely 2/2 reflux s/p esophageal stent requiring dilation and reposition 4/29. Stent in place on CT, patient without dysphagia complaints at this time.    Recommendation:  - Surgery consult for evaluation for possible Puestow, previously discussed with Dr Trejo outpatient  - would not remove esophageal stent at this time  - PPI daily  - pain control per primary team    Note not finalized until signed by attending.    Desire Larios PGY-6  Gastroenterology/Hepatology Fellow  Pager #[phone] (RYAN) or [phone] (NS)  Available on [employer] Teams.  Please contact on-call GI fellow via answering service ([phone]) after 5pm and before 8am, and on weekends.

## 2022-07-20 NOTE — PROGRESS NOTE ADULT - ASSESSMENT
61M with PMHx of chronic pancreatitis (2/2 alcohol abuse, c/b chronic pain), CKD4, hep C s/p SVR and benign esophageal stricture (post-stent 4/19/22) presents with epigastric pain, now s/p Peustow procedure 7/20. SICU consulted for pain management and hyperkalemia. Pt does not have critical care needs at this time, however we will follow in the immediate postop period.    #Hyperkalemia  - Send repeat post-op labs, f/u potassium levels  - Shift with insulin/D50 as needed, follow hyperkalemia protocol    #Pain management:  - PCEA ordered  - Restart home meds when able to take PO    Discussed with SICU attending.    Please call with any questions  [phone]

## 2022-07-20 NOTE — CONSULT NOTE ADULT - REASON FOR ADMISSION
Pt here for witness syncope, fell backwards and hit her head on the wall. LOC for about 20 seconds. Did not eat today.  Confused upon arrival.
abdominal pain

## 2022-07-20 NOTE — PROGRESS NOTE ADULT - PROBLEM SELECTOR PLAN 2
Stage 4 CKD, baseline creatinine around 2.5-3.0. Currently appears to be at baseline. Patient does not currently have a nephrologist. Unclear etiology of CKD at this time. Urine output 1.7L yesterday. Repeat UA without protein. Continue to monitor I/Os. Avoid nephrotoxic medications and dose medications as per eGFR.      NOTE INCOMPLETE Stage 4 CKD, baseline creatinine around 2.5-3.0. Currently appears to be at baseline. Patient does not currently have a nephrologist. Unclear etiology of CKD at this time. Urine output 1.7L yesterday. Repeat UA without protein. Continue to monitor I/Os. Avoid nephrotoxic medications and dose medications as per eGFR.      Discussed with Dr. Chambers.

## 2022-07-20 NOTE — CHART NOTE - NSCHARTNOTEFT_GEN_A_CORE
Surgery Post-Op Note    Pre-Op Dx:   Procedure: Puestow procedure      Surgeon: Dr. Pathak    SUBJECTIVE:  Pt seen and examined at the bedside. Patient complaining of very poorly controlled pain. CINDY drain output 30ml serosanguinous. Preveena vac in place. Denies F/C/N/V.    OBJECTIVE:  Vital Signs Last 24 Hrs  T(C): 36.4 (2022 17:25), Max: 37 (2022 04:44)  T(F): 97.5 (2022 17:25), Max: 98.6 (2022 04:44)  HR: 95 (2022 22:40) (81 - 128)  BP: 150/75 (2022 22:40) (133/69 - 176/97)  BP(mean): 95 (2022 22:40) (85 - 117)  RR: 18 (2022 22:30) (10 - 36)  SpO2: 96% (2022 22:40) (95% - 100%)    Parameters below as of 2022 17:30  Patient On (Oxygen Delivery Method): nasal cannula  O2 Flow (L/min): 2      Physical Exam:  General: NAD, resting comfortably in bed  Neuro: A/O x 3, no focal deficits  Pulmonary: Nonlabored breathing, no respiratory distress  Cardiovascular: NSR  Abdominal: soft, ATTP. ND, NGT in palce  Incision: preveena vac over laparotomy C/D/I   Drains: CINDY drain: 30ml output  Extremities: WWP    LABS:                        10.3   14.62 )-----------( 217      ( 2022 18:03 )             32.6     07-20    140  |  107  |  46<H>  ----------------------------<  91  5.2   |  21<L>  |  2.21<H>    Ca    8.4      2022 18:03  Phos  3.4     07-20  Mg     1.70     07-20    TPro  6.0  /  Alb  3.5  /  TBili  <0.2  /  DBili  x   /  AST  9   /  ALT  7   /  AlkPhos  98  07-19    PT/INR - ( 2022 00:24 )   PT: 10.9 sec;   INR: 0.94 ratio           CAPILLARY BLOOD GLUCOSE        Urinalysis Basic - ( 2022 08:00 )    Color: Light Yellow / Appearance: Clear / S.015 / pH: x  Gluc: x / Ketone: Negative  / Bili: Negative / Urobili: <2 mg/dL   Blood: x / Protein: Negative / Nitrite: Negative   Leuk Esterase: Negative / RBC: x / WBC x   Sq Epi: x / Non Sq Epi: x / Bacteria: x      LIVER FUNCTIONS - ( 2022 05:21 )  Alb: 3.5 g/dL / Pro: 6.0 g/dL / ALK PHOS: 98 U/L / ALT: 7 U/L / AST: 9 U/L / GGT: x           ABO Interpretation: B ( @ 00:40)      IMAGING:    ASSESSMENT:61y Male now 4hours s/p pusestow procedure 2/2 chronic pancreatiits.    PLAN:  - Pain control- apprecaite pain recs  - q6 BMPs, shift K+ as needed  - Encourage IS  - Nausea control PRN  - Monitor vitals  - Diet: IVF  - Monitor I+Os  - OOB/ Ambulate  - DVT ppx:      D Team Surgery  [phone]

## 2022-07-21 LAB
ANION GAP SERPL CALC-SCNC: 10 MMOL/L — SIGNIFICANT CHANGE UP (ref 7–14)
ANION GAP SERPL CALC-SCNC: 11 MMOL/L — SIGNIFICANT CHANGE UP (ref 7–14)
ANION GAP SERPL CALC-SCNC: 8 MMOL/L — SIGNIFICANT CHANGE UP (ref 7–14)
APTT BLD: 27.5 SEC — SIGNIFICANT CHANGE UP (ref 27–36.3)
BUN SERPL-MCNC: 42 MG/DL — HIGH (ref 7–23)
BUN SERPL-MCNC: 43 MG/DL — HIGH (ref 7–23)
BUN SERPL-MCNC: 43 MG/DL — HIGH (ref 7–23)
CALCIUM SERPL-MCNC: 8.6 MG/DL — SIGNIFICANT CHANGE UP (ref 8.4–10.5)
CALCIUM SERPL-MCNC: 8.8 MG/DL — SIGNIFICANT CHANGE UP (ref 8.4–10.5)
CALCIUM SERPL-MCNC: 9 MG/DL — SIGNIFICANT CHANGE UP (ref 8.4–10.5)
CHLORIDE SERPL-SCNC: 103 MMOL/L — SIGNIFICANT CHANGE UP (ref 98–107)
CHLORIDE SERPL-SCNC: 104 MMOL/L — SIGNIFICANT CHANGE UP (ref 98–107)
CHLORIDE SERPL-SCNC: 105 MMOL/L — SIGNIFICANT CHANGE UP (ref 98–107)
CO2 SERPL-SCNC: 21 MMOL/L — LOW (ref 22–31)
CO2 SERPL-SCNC: 22 MMOL/L — SIGNIFICANT CHANGE UP (ref 22–31)
CO2 SERPL-SCNC: 25 MMOL/L — SIGNIFICANT CHANGE UP (ref 22–31)
CREAT SERPL-MCNC: 2.2 MG/DL — HIGH (ref 0.5–1.3)
CREAT SERPL-MCNC: 2.27 MG/DL — HIGH (ref 0.5–1.3)
CREAT SERPL-MCNC: 2.34 MG/DL — HIGH (ref 0.5–1.3)
CULTURE RESULTS: SIGNIFICANT CHANGE UP
EGFR: 31 ML/MIN/1.73M2 — LOW
EGFR: 32 ML/MIN/1.73M2 — LOW
EGFR: 33 ML/MIN/1.73M2 — LOW
GLUCOSE BLDC GLUCOMTR-MCNC: 110 MG/DL — HIGH (ref 70–99)
GLUCOSE BLDC GLUCOMTR-MCNC: 110 MG/DL — HIGH (ref 70–99)
GLUCOSE BLDC GLUCOMTR-MCNC: 113 MG/DL — HIGH (ref 70–99)
GLUCOSE BLDC GLUCOMTR-MCNC: 156 MG/DL — HIGH (ref 70–99)
GLUCOSE BLDC GLUCOMTR-MCNC: 303 MG/DL — HIGH (ref 70–99)
GLUCOSE BLDC GLUCOMTR-MCNC: 431 MG/DL — HIGH (ref 70–99)
GLUCOSE SERPL-MCNC: 108 MG/DL — HIGH (ref 70–99)
GLUCOSE SERPL-MCNC: 130 MG/DL — HIGH (ref 70–99)
GLUCOSE SERPL-MCNC: 150 MG/DL — HIGH (ref 70–99)
HCT VFR BLD CALC: 40.3 % — SIGNIFICANT CHANGE UP (ref 39–50)
HGB BLD-MCNC: 12.5 G/DL — LOW (ref 13–17)
INR BLD: 1.03 RATIO — SIGNIFICANT CHANGE UP (ref 0.88–1.16)
MAGNESIUM SERPL-MCNC: 1.4 MG/DL — LOW (ref 1.6–2.6)
MAGNESIUM SERPL-MCNC: 1.4 MG/DL — LOW (ref 1.6–2.6)
MAGNESIUM SERPL-MCNC: 1.8 MG/DL — SIGNIFICANT CHANGE UP (ref 1.6–2.6)
MCHC RBC-ENTMCNC: 30 PG — SIGNIFICANT CHANGE UP (ref 27–34)
MCHC RBC-ENTMCNC: 31 GM/DL — LOW (ref 32–36)
MCV RBC AUTO: 96.6 FL — SIGNIFICANT CHANGE UP (ref 80–100)
NRBC # BLD: 0 /100 WBCS — SIGNIFICANT CHANGE UP
NRBC # FLD: 0 K/UL — SIGNIFICANT CHANGE UP
PHOSPHATE SERPL-MCNC: 3.4 MG/DL — SIGNIFICANT CHANGE UP (ref 2.5–4.5)
PHOSPHATE SERPL-MCNC: 3.4 MG/DL — SIGNIFICANT CHANGE UP (ref 2.5–4.5)
PHOSPHATE SERPL-MCNC: 4 MG/DL — SIGNIFICANT CHANGE UP (ref 2.5–4.5)
PLATELET # BLD AUTO: 284 K/UL — SIGNIFICANT CHANGE UP (ref 150–400)
POTASSIUM SERPL-MCNC: 5.6 MMOL/L — HIGH (ref 3.5–5.3)
POTASSIUM SERPL-MCNC: 6.3 MMOL/L — CRITICAL HIGH (ref 3.5–5.3)
POTASSIUM SERPL-MCNC: 6.5 MMOL/L — CRITICAL HIGH (ref 3.5–5.3)
POTASSIUM SERPL-SCNC: 5.6 MMOL/L — HIGH (ref 3.5–5.3)
POTASSIUM SERPL-SCNC: 6.3 MMOL/L — CRITICAL HIGH (ref 3.5–5.3)
POTASSIUM SERPL-SCNC: 6.5 MMOL/L — CRITICAL HIGH (ref 3.5–5.3)
PROTHROM AB SERPL-ACNC: 12 SEC — SIGNIFICANT CHANGE UP (ref 10.5–13.4)
RBC # BLD: 4.17 M/UL — LOW (ref 4.2–5.8)
RBC # FLD: 20.5 % — HIGH (ref 10.3–14.5)
SODIUM SERPL-SCNC: 136 MMOL/L — SIGNIFICANT CHANGE UP (ref 135–145)
SODIUM SERPL-SCNC: 136 MMOL/L — SIGNIFICANT CHANGE UP (ref 135–145)
SODIUM SERPL-SCNC: 137 MMOL/L — SIGNIFICANT CHANGE UP (ref 135–145)
SPECIMEN SOURCE: SIGNIFICANT CHANGE UP
WBC # BLD: 20.98 K/UL — HIGH (ref 3.8–10.5)
WBC # FLD AUTO: 20.98 K/UL — HIGH (ref 3.8–10.5)

## 2022-07-21 PROCEDURE — 93010 ELECTROCARDIOGRAM REPORT: CPT | Mod: 76

## 2022-07-21 PROCEDURE — 99233 SBSQ HOSP IP/OBS HIGH 50: CPT | Mod: GC

## 2022-07-21 RX ORDER — SODIUM BICARBONATE 1 MEQ/ML
50 SYRINGE (ML) INTRAVENOUS ONCE
Refills: 0 | Status: COMPLETED | OUTPATIENT
Start: 2022-07-21 | End: 2022-07-21

## 2022-07-21 RX ORDER — HYDROMORPHONE HYDROCHLORIDE 2 MG/ML
0.5 INJECTION INTRAMUSCULAR; INTRAVENOUS; SUBCUTANEOUS EVERY 4 HOURS
Refills: 0 | Status: DISCONTINUED | OUTPATIENT
Start: 2022-07-21 | End: 2022-07-21

## 2022-07-21 RX ORDER — FUROSEMIDE 40 MG
40 TABLET ORAL THREE TIMES A DAY
Refills: 0 | Status: DISCONTINUED | OUTPATIENT
Start: 2022-07-21 | End: 2022-07-21

## 2022-07-21 RX ORDER — FUROSEMIDE 40 MG
40 TABLET ORAL
Refills: 0 | Status: DISCONTINUED | OUTPATIENT
Start: 2022-07-21 | End: 2022-07-25

## 2022-07-21 RX ORDER — INSULIN HUMAN 100 [IU]/ML
5 INJECTION, SOLUTION SUBCUTANEOUS ONCE
Refills: 0 | Status: COMPLETED | OUTPATIENT
Start: 2022-07-21 | End: 2022-07-21

## 2022-07-21 RX ORDER — LABETALOL HCL 100 MG
10 TABLET ORAL ONCE
Refills: 0 | Status: COMPLETED | OUTPATIENT
Start: 2022-07-21 | End: 2022-07-21

## 2022-07-21 RX ORDER — METOPROLOL TARTRATE 50 MG
5 TABLET ORAL EVERY 6 HOURS
Refills: 0 | Status: DISCONTINUED | OUTPATIENT
Start: 2022-07-21 | End: 2022-07-25

## 2022-07-21 RX ORDER — METOPROLOL TARTRATE 50 MG
5 TABLET ORAL EVERY 6 HOURS
Refills: 0 | Status: DISCONTINUED | OUTPATIENT
Start: 2022-07-21 | End: 2022-07-21

## 2022-07-21 RX ORDER — SODIUM CHLORIDE 9 MG/ML
1000 INJECTION, SOLUTION INTRAVENOUS
Refills: 0 | Status: DISCONTINUED | OUTPATIENT
Start: 2022-07-21 | End: 2022-07-21

## 2022-07-21 RX ORDER — LABETALOL HCL 100 MG
20 TABLET ORAL ONCE
Refills: 0 | Status: COMPLETED | OUTPATIENT
Start: 2022-07-21 | End: 2022-07-21

## 2022-07-21 RX ORDER — CALCIUM GLUCONATE 100 MG/ML
1.5 VIAL (ML) INTRAVENOUS ONCE
Refills: 0 | Status: DISCONTINUED | OUTPATIENT
Start: 2022-07-21 | End: 2022-07-21

## 2022-07-21 RX ORDER — ROPIVACAINE HCL/PF 5 MG/ML
200 AMPUL (ML) INJECTION
Refills: 0 | Status: DISCONTINUED | OUTPATIENT
Start: 2022-07-21 | End: 2022-07-21

## 2022-07-21 RX ORDER — NALOXONE HYDROCHLORIDE 4 MG/.1ML
0.1 SPRAY NASAL
Refills: 0 | Status: DISCONTINUED | OUTPATIENT
Start: 2022-07-21 | End: 2022-07-25

## 2022-07-21 RX ORDER — SODIUM BICARBONATE 1 MEQ/ML
0.16 SYRINGE (ML) INTRAVENOUS
Qty: 150 | Refills: 0 | Status: COMPLETED | OUTPATIENT
Start: 2022-07-21 | End: 2022-07-22

## 2022-07-21 RX ORDER — HYDROMORPHONE HYDROCHLORIDE 2 MG/ML
30 INJECTION INTRAMUSCULAR; INTRAVENOUS; SUBCUTANEOUS
Refills: 0 | Status: DISCONTINUED | OUTPATIENT
Start: 2022-07-21 | End: 2022-07-22

## 2022-07-21 RX ORDER — HYDROMORPHONE HYDROCHLORIDE 2 MG/ML
5 INJECTION INTRAMUSCULAR; INTRAVENOUS; SUBCUTANEOUS
Refills: 0 | Status: DISCONTINUED | OUTPATIENT
Start: 2022-07-21 | End: 2022-07-21

## 2022-07-21 RX ORDER — SODIUM BICARBONATE 1 MEQ/ML
0.05 SYRINGE (ML) INTRAVENOUS
Qty: 50 | Refills: 0 | Status: DISCONTINUED | OUTPATIENT
Start: 2022-07-21 | End: 2022-07-21

## 2022-07-21 RX ORDER — ONDANSETRON 8 MG/1
4 TABLET, FILM COATED ORAL EVERY 6 HOURS
Refills: 0 | Status: DISCONTINUED | OUTPATIENT
Start: 2022-07-21 | End: 2022-07-25

## 2022-07-21 RX ORDER — CALCIUM GLUCONATE 100 MG/ML
1 VIAL (ML) INTRAVENOUS ONCE
Refills: 0 | Status: COMPLETED | OUTPATIENT
Start: 2022-07-21 | End: 2022-07-21

## 2022-07-21 RX ORDER — DEXTROSE 50 % IN WATER 50 %
50 SYRINGE (ML) INTRAVENOUS ONCE
Refills: 0 | Status: COMPLETED | OUTPATIENT
Start: 2022-07-21 | End: 2022-07-21

## 2022-07-21 RX ORDER — ROPIVACAINE HCL/PF 5 MG/ML
5 AMPUL (ML) INJECTION
Refills: 0 | Status: DISCONTINUED | OUTPATIENT
Start: 2022-07-21 | End: 2022-07-24

## 2022-07-21 RX ORDER — ROPIVACAINE HCL/PF 5 MG/ML
200 AMPUL (ML) INJECTION
Refills: 0 | Status: DISCONTINUED | OUTPATIENT
Start: 2022-07-21 | End: 2022-07-22

## 2022-07-21 RX ORDER — HYDROMORPHONE HYDROCHLORIDE 2 MG/ML
0.5 INJECTION INTRAMUSCULAR; INTRAVENOUS; SUBCUTANEOUS
Refills: 0 | Status: DISCONTINUED | OUTPATIENT
Start: 2022-07-21 | End: 2022-07-22

## 2022-07-21 RX ORDER — MAGNESIUM SULFATE 500 MG/ML
2 VIAL (ML) INJECTION ONCE
Refills: 0 | Status: COMPLETED | OUTPATIENT
Start: 2022-07-21 | End: 2022-07-21

## 2022-07-21 RX ORDER — NALOXONE HYDROCHLORIDE 4 MG/.1ML
0.1 SPRAY NASAL
Refills: 0 | Status: DISCONTINUED | OUTPATIENT
Start: 2022-07-21 | End: 2022-07-22

## 2022-07-21 RX ORDER — HYDROMORPHONE HYDROCHLORIDE 2 MG/ML
1 INJECTION INTRAMUSCULAR; INTRAVENOUS; SUBCUTANEOUS ONCE
Refills: 0 | Status: DISCONTINUED | OUTPATIENT
Start: 2022-07-21 | End: 2022-07-21

## 2022-07-21 RX ORDER — HYDROMORPHONE HYDROCHLORIDE 2 MG/ML
1 INJECTION INTRAMUSCULAR; INTRAVENOUS; SUBCUTANEOUS EVERY 4 HOURS
Refills: 0 | Status: DISCONTINUED | OUTPATIENT
Start: 2022-07-21 | End: 2022-07-21

## 2022-07-21 RX ADMIN — Medication 400 MILLIGRAM(S): at 10:56

## 2022-07-21 RX ADMIN — Medication 5 MILLIGRAM(S): at 21:15

## 2022-07-21 RX ADMIN — Medication 10 MILLIGRAM(S): at 19:41

## 2022-07-21 RX ADMIN — Medication 50 MILLIEQUIVALENT(S): at 05:00

## 2022-07-21 RX ADMIN — Medication 10 MILLIGRAM(S): at 06:30

## 2022-07-21 RX ADMIN — Medication 200 MILLILITER(S): at 12:16

## 2022-07-21 RX ADMIN — Medication 75 MEQ/KG/HR: at 14:03

## 2022-07-21 RX ADMIN — Medication 40 MILLIGRAM(S): at 10:49

## 2022-07-21 RX ADMIN — Medication 1000 MILLIGRAM(S): at 11:26

## 2022-07-21 RX ADMIN — PANTOPRAZOLE SODIUM 40 MILLIGRAM(S): 20 TABLET, DELAYED RELEASE ORAL at 12:42

## 2022-07-21 RX ADMIN — Medication 100 GRAM(S): at 04:12

## 2022-07-21 RX ADMIN — HYDROMORPHONE HYDROCHLORIDE 5 MILLILITER(S): 2 INJECTION INTRAMUSCULAR; INTRAVENOUS; SUBCUTANEOUS at 10:11

## 2022-07-21 RX ADMIN — Medication 5 MILLIGRAM(S): at 16:26

## 2022-07-21 RX ADMIN — Medication 200 MILLILITER(S): at 19:31

## 2022-07-21 RX ADMIN — HYDROMORPHONE HYDROCHLORIDE 30 MILLILITER(S): 2 INJECTION INTRAMUSCULAR; INTRAVENOUS; SUBCUTANEOUS at 19:34

## 2022-07-21 RX ADMIN — Medication 5 MILLIGRAM(S): at 12:42

## 2022-07-21 RX ADMIN — HYDROMORPHONE HYDROCHLORIDE 250 MILLILITER(S): 2 INJECTION INTRAMUSCULAR; INTRAVENOUS; SUBCUTANEOUS at 08:20

## 2022-07-21 RX ADMIN — INSULIN HUMAN 5 UNIT(S): 100 INJECTION, SOLUTION SUBCUTANEOUS at 05:34

## 2022-07-21 RX ADMIN — Medication 100 GRAM(S): at 18:09

## 2022-07-21 RX ADMIN — Medication 25 GRAM(S): at 10:50

## 2022-07-21 RX ADMIN — HYDROMORPHONE HYDROCHLORIDE 1 MILLIGRAM(S): 2 INJECTION INTRAMUSCULAR; INTRAVENOUS; SUBCUTANEOUS at 05:00

## 2022-07-21 RX ADMIN — Medication 50 MILLILITER(S): at 05:35

## 2022-07-21 RX ADMIN — HYDROMORPHONE HYDROCHLORIDE 30 MILLILITER(S): 2 INJECTION INTRAMUSCULAR; INTRAVENOUS; SUBCUTANEOUS at 12:20

## 2022-07-21 RX ADMIN — Medication 50 MILLILITER(S): at 18:40

## 2022-07-21 RX ADMIN — HYDROMORPHONE HYDROCHLORIDE 1 MILLIGRAM(S): 2 INJECTION INTRAMUSCULAR; INTRAVENOUS; SUBCUTANEOUS at 00:26

## 2022-07-21 RX ADMIN — INSULIN HUMAN 5 UNIT(S): 100 INJECTION, SOLUTION SUBCUTANEOUS at 18:40

## 2022-07-21 NOTE — PROVIDER CONTACT NOTE (CRITICAL VALUE NOTIFICATION) - RECOMMENDATIONS
per provider
initiate hyperkalemia protocol; redraw bmp in 6  hrs; RN questioned sodium bicarb gtt infusing while hyperkalemia protocol in place

## 2022-07-21 NOTE — PROGRESS NOTE ADULT - PROBLEM SELECTOR PLAN 2
Stage 4 CKD, baseline creatinine around 2.5-3.0. Currently appears to be at baseline. Patient does not currently have a nephrologist. Unclear etiology of CKD at this time. Urine output 2.3L yesterday. Repeat UA without protein. Continue to monitor I/Os. Avoid nephrotoxic medications and dose medications as per eGFR.      Discussed with Dr. Chambers.

## 2022-07-21 NOTE — PROVIDER CONTACT NOTE (CHANGE IN STATUS NOTIFICATION) - SITUATION
patient removed NGT to lcws, 0cc output throughout day shift, patient states "it was uncomfortable, I want it out" patient removed NGT to lcws, 200cc output upon removal, patient states "it was uncomfortable, I want it out"

## 2022-07-21 NOTE — PROVIDER CONTACT NOTE (CHANGE IN STATUS NOTIFICATION) - SITUATION
upon shift assessment & RN arrival on day shift, patient chen catheter leaking at site, urine present on absorbent pad; patient PIV not flushing, resistance met with flushing; retake potassium 5.6; patient c/o of 8/10 pain on abdomen site generalized surgical pain; /93  other vitals stable

## 2022-07-21 NOTE — PROVIDER CONTACT NOTE (OTHER) - REASON
Patient c/o RLE arm discomfort, swelling noted.
patient blood pressure 181/89
Pt requesting oxycontin ER since he was told he was getting that after speaking to the provider during the day.
bs 431 retake 303

## 2022-07-21 NOTE — PROVIDER CONTACT NOTE (OTHER) - SITUATION
patient blood pressure 181/89
bs 431 retake 303 during IV dextrose/insulin administration with hyperkalemia protocol
Patient c/o RLE arm discomfort, swelling noted.
Pt requesting oxycontin ER since he was told he was getting that after speaking to the provider during the day.

## 2022-07-21 NOTE — PROVIDER CONTACT NOTE (CHANGE IN STATUS NOTIFICATION) - ACTION/TREATMENT ORDERED:
Jose Manuel Pacheco team D arrived to bedside, advised to keep chen catheter in place, attending would like to keep chen catheter in one more day; balloon reinflated with new 10cc saline; will place new PIV via ultrasound for patient; will continue to monitor chen site for leaking; safety maintained. Jose Manuel Pacheco team D arrived to bedside, advised to keep chen catheter in place, attending would like to keep chen catheter in one more day; balloon reinflated with new 10cc saline; will place new PIV via ultrasound for patient; will continue to monitor chen site for leaking; once PIV placed with ultrasound, will place ordered for IVP hydralazine & IVP pain medication, as well as hyperkalemia protocol meds; see orders as per eMAR

## 2022-07-21 NOTE — PROVIDER CONTACT NOTE (CHANGE IN STATUS NOTIFICATION) - RECOMMENDATIONS
arrive to bedside to assess patient, place new NGT
order another IV BP medication or administer IV hydralazine x1 as ordered
team D arrive to bedside; place new IV access with ultrasound for pain medication to be administered; patient difficult PIV stick; arrive to bedside to assess chen catheter site, discontinue chen attempt trial of void

## 2022-07-21 NOTE — PROGRESS NOTE ADULT - PROBLEM SELECTOR PLAN 1
Hyperkalemia likely in setting of CKD. Patient hyperkalemic last night after procedure, suspect due to holding PO medications for surgical procedure. Patient currently asymptomatic and without EKG changes. Continue with temporizing measures. Lokelma and sodium bicarb on hold due to NPO status. Can start sodium bicarbonate drip 150meq in D5W at 75 cc/hr. Recommend decreasing IV lasix to 40mg BID. Resume low K diet once able to receive PO. If K levels persistently elevated can give regular insulin with D50; CKD not a contraindication for insulin. No indication for dialysis at this time. Avoid ACEi or ARB for BP management.

## 2022-07-21 NOTE — PROVIDER CONTACT NOTE (OTHER) - ACTION/TREATMENT ORDERED:
Team D Miki Dunham made aware, advised to continue sodium bicarb gtt & retake BS in an hour, will reinforce to night shift RN

## 2022-07-21 NOTE — PROVIDER CONTACT NOTE (OTHER) - RECOMMENDATIONS
Continue to monitor patient.
have acp see pt
hold IV sodium bicarb with dextrose gtt during hyperkalemia protocol; retake bs in an hour
per provider

## 2022-07-21 NOTE — PROVIDER CONTACT NOTE (CHANGE IN STATUS NOTIFICATION) - ASSESSMENT
upon shift assessment & RN arrival on day shift, patient chen catheter leaking at site, urine present on absorbent pad; patient PIV not flushing, resistance met with flushing; retake potassium 5.6; patient c/o of 8/10 pain on abdomen site generalized surgical pain

## 2022-07-21 NOTE — PROGRESS NOTE ADULT - SUBJECTIVE AND OBJECTIVE BOX
Nephrology progress note    Patient is a 61y Male with PMHx of chronic pancreatitis (complicated by chronic pain of which secondary to alcohol abuse), CKD4, hep C s/p SVR and benign esophageal stricture (post-stent 22 c/b need for CRE balloon dilation, reposition and suture on ) presents with epigastric pain. Now s/p Puestow procedure. Course complicated by persistent hyperkalemia.     Subjective:  Patient seen this AM. Endorsing pain in abdomen and back. Denies nausea or vomiting. Chen in place, draining clear yellow urine. Patient on dilaudid epidural PCA pump.     Allergies:  No Known Allergies    Hospital Medications:   MEDICATIONS  (STANDING):  acetaminophen   IVPB .. 1000 milliGRAM(s) IV Intermittent once  famotidine Injectable 40 milliGRAM(s) IV Push once  furosemide   Injectable 40 milliGRAM(s) IV Push two times a day  heparin   Injectable 5000 Unit(s) SubCutaneous every 12 hours  hydrALAZINE Injectable 10 milliGRAM(s) IV Push once  HYDROmorphone PCA (1 mG/mL) 30 milliLiter(s) PCA Continuous PCA Continuous  metoprolol tartrate Injectable 5 milliGRAM(s) IV Push every 6 hours  pantoprazole  Injectable 40 milliGRAM(s) IV Push daily  ropivacaine 0.2% in Sodium Chloride PCEA 200 milliLiter(s) Epidural PCA Continuous  sodium bicarbonate  Infusion 0.156 mEq/kG/Hr (75 mL/Hr) IV Continuous <Continuous>  sodium zirconium cyclosilicate 10 Gram(s) Oral three times a day    MEDICATIONS  (PRN):  HYDROmorphone PCA (1 mG/mL) Rescue Clinician Bolus 0.5 milliGRAM(s) IV Push every 15 minutes PRN for Pain Scale GREATER THAN 6  naloxone Injectable 0.1 milliGRAM(s) IV Push every 3 minutes PRN For ANY of the following changes in patient status:  A. RR LESS THAN 10 breaths per minute, B. Oxygen saturation LESS THAN 90%, C. Sedation score of 6  naloxone Injectable 0.1 milliGRAM(s) IV Push every 3 minutes PRN For ANY of the following changes in patient status:  A. RR LESS THAN 10 breaths per minute, B. Oxygen saturation LESS THAN 90%, C. Sedation score of 6  ondansetron Injectable 4 milliGRAM(s) IV Push every 6 hours PRN Nausea  ropivacaine 0.2% in Sodium Chloride PCEA Rescue Clinician Bolus 5 milliLiter(s) Epidural every 15 minutes PRN for Pain Score greater than 6    REVIEW OF SYSTEMS:  CONSTITUTIONAL: No weakness, fevers or chills  EYES/ENT: No visual changes;  No vertigo or throat pain   NECK: No pain or stiffness  RESPIRATORY: No cough, wheezing, hemoptysis; No shortness of breath  CARDIOVASCULAR: No chest pain or palpitations.  GASTROINTESTINAL: + abdominal pain. No nausea, vomiting, or hematemesis; No diarrhea or constipation. No melena or hematochezia.  GENITOURINARY: No dysuria, frequency, foamy urine, urinary urgency, incontinence or hematuria  NEUROLOGICAL: No numbness or weakness  SKIN: No itching, burning, rashes, or lesions   VASCULAR: No bilateral lower extremity edema.   All other review of systems is negative unless indicated above.    VITALS:  Vital Signs Last 24 Hrs  T(C): 37.4 (2022 08:07), Max: 37.4 (2022 08:07)  T(F): 99.3 (2022 08:07), Max: 99.3 (2022 08:07)  HR: 104 (2022 10:47) (87 - 128)  BP: 164/98 (2022 10:47) (133/69 - 181/89)  BP(mean): 95 (2022 22:40) (85 - 117)  RR: 18 (2022 10:47) (10 - 36)  SpO2: 99% (2022 10:47) (95% - 100%)    Parameters below as of 2022 10:47  Patient On (Oxygen Delivery Method): room air         @ :  -   @ 07:00  --------------------------------------------------------  IN: 1235 mL / OUT: 1700 mL / NET: -465 mL     @ 07:  -   @ 07:00  --------------------------------------------------------  IN: 1250 mL / OUT: 2336 mL / NET: -1086 mL     @ 07:01  -   @ 12:03  --------------------------------------------------------  IN: 0 mL / OUT: 125 mL / NET: -125 mL        PHYSICAL EXAM:  Constitutional: NAD  HEENT: anicteric sclera, oropharynx clear, MMM  Neck: No JVD  Respiratory: CTAB, ronchi in bases, poor inspiratory effort due to pain.  Cardiovascular: S1, S2, RRR  Gastrointestinal: Diffusely tender, surgical incision with dressing in midline  Extremities: No cyanosis or clubbing. No peripheral edema  Neurological: A/O x 3, no focal deficits  : No CVA tenderness. chen in place  Skin: No rashes  Vascular Access:    LABS:      137  |  105  |  43<H>  ----------------------------<  150<H>  5.6<H>   |  22  |  2.27<H>    Ca    8.6      2022 06:15  Phos  3.4       Mg     1.40                                 12.5   20.98 )-----------( 284      ( 2022 03:07 )             40.3       Urine Studies:  Urinalysis Basic - ( 2022 08:00 )    Color: Light Yellow / Appearance: Clear / S.015 / pH:   Gluc:  / Ketone: Negative  / Bili: Negative / Urobili: <2 mg/dL   Blood:  / Protein: Negative / Nitrite: Negative   Leuk Esterase: Negative / RBC:  / WBC    Sq Epi:  / Non Sq Epi:  / Bacteria:       Creatinine, Random Urine: 52 mg/dL ( @ 08:00)  Protein/Creatinine Ratio Calculation: 0.2 Ratio ( @ 08:00)    RADIOLOGY & ADDITIONAL STUDIES:

## 2022-07-21 NOTE — PROGRESS NOTE ADULT - SUBJECTIVE AND OBJECTIVE BOX
Surgery Progress Note    S: Patient seen and examined. O/N pt was found to have K of 6.5. Pt was given 1g calcium gluconate, 5u insulin, 1 amp dextrose and 50meq bicarb. EKG was performed and showed sinus tachycardia. Pt bowel function - / -. He endorses severe pain, uncontrolled with PCA pump, pt requesting morphine. Pt complaining of nausea, denies vomiting. Pt is not OOB, states he is in too much pain.    O:  Vital Signs Last 24 Hrs  T(C): 37.2 (21 Jul 2022 04:58), Max: 37.2 (21 Jul 2022 04:58)  T(F): 99 (21 Jul 2022 04:58), Max: 99 (21 Jul 2022 04:58)  HR: 112 (21 Jul 2022 04:58) (83 - 128)  BP: 181/89 (21 Jul 2022 04:58) (133/69 - 181/89)  BP(mean): 95 (20 Jul 2022 22:40) (85 - 117)  RR: 18 (21 Jul 2022 04:58) (10 - 36)  SpO2: 97% (21 Jul 2022 04:58) (95% - 100%)    Parameters below as of 21 Jul 2022 04:58  Patient On (Oxygen Delivery Method): room air    I&O's Detail    20 Jul 2022 07:01  -  21 Jul 2022 07:00  --------------------------------------------------------  IN:    dextrose 5% + sodium chloride 0.9%: 1000 mL    Lactated Ringers: 150 mL    PRBCs (Packed Red Blood Cells): 100 mL  Total IN: 1250 mL    OUT:    Bulb (mL): 171 mL    Indwelling Catheter - Urethral (mL): 1565 mL    Nasogastric/Oral tube (mL): 200 mL    Oral Fluid: 0 mL    Voided (mL): 400 mL  Total OUT: 2336 mL    Total NET: -1086 mL                          12.5   20.98 )-----------( 284      ( 21 Jul 2022 03:07 )             40.3       07-21    136  |  104  |  43<H>  ----------------------------<  108<H>  6.5<HH>   |  21<L>  |  2.20<H>    Ca    9.0      21 Jul 2022 03:07  Phos  3.4     07-21  Mg     1.40     07-21    Physical Exam:  Gen: Laying in bed, NAD  Resp: Unlabored breathing  Abd: soft, NTND, Prevena in place. CINDY drain serosanguinous. NGT in place, gastric contents   : Kenney in place, draining clear liquid.   Ext: WWP  Skin: No rashes

## 2022-07-21 NOTE — PROVIDER CONTACT NOTE (CRITICAL VALUE NOTIFICATION) - ACTION/TREATMENT ORDERED:
Awaiting further orders at this time
THOMAS Marcum team D made aware, hyperkalemia protocol ordered, ok to continue to run IV sodium bicarb while initiating protocol.
EKG. calcium gluconate IVPB. insulin reg 5U IV. dextrose 50% IV.

## 2022-07-21 NOTE — PROGRESS NOTE ADULT - ASSESSMENT
61M with PMHx of ETOH abuse, chronic pancreatitis with chronic PD dilation, PD stone and calcifications, perforated gastric ulcer s/p exlap with David patch repair (2019, Dr. Murary), CKD4, hep C s/p SVR and benign esophageal stricture (post-stent 4/19/22 c/b need for CRE balloon dilation, reposition and suture on 4/29) presents with a flare of chronic pancreatitis. Pt now POD 1 from Puestow procedure.     Plan/Recommendations:  - Diet: NPO   - O&P for eval of stool parasites (worms seen in stool by RN  - FU nephrology recommendations for hyperkalemia and CKD  - FU pain recs   - FU BMP, pt may require additional K shift.  61M with PMHx of ETOH abuse, chronic pancreatitis with chronic PD dilation, PD stone and calcifications, perforated gastric ulcer s/p exlap with David patch repair (2019, Dr. Murray), CKD4, hep C s/p SVR and benign esophageal stricture (post-stent 4/19/22 c/b need for CRE balloon dilation, reposition and suture on 4/29) presents with a flare of chronic pancreatitis. Pt now POD 1 from Puestow procedure.     Plan/Recommendations:  - Diet: NPO   - O&P for eval of stool parasites (worms seen in stool by RN  - FU nephrology recommendations for hyperkalemia and CKD  - FU pain recs   - FU BMP, pt may require additional K shift.   - Monitor vital signs  - DVT prophylaxis

## 2022-07-21 NOTE — PROVIDER CONTACT NOTE (CHANGE IN STATUS NOTIFICATION) - ACTION/TREATMENT ORDERED:
Brigitte Marcum  [id number] team D  made aware, awaiting response Brigitte Marcum  [id number] team D  made aware, team D Tammie Solis arrived to bedside. ok to leave NGT out at this time.

## 2022-07-21 NOTE — PROVIDER CONTACT NOTE (OTHER) - ASSESSMENT
Pt is A&Ox4. Pt denies SOB and chest pain. VSS.
patient blood pressure 181/89
Pt is A&Ox4. Pt denies SOB and chest pain. Upon assessment R forearm is swollen +1 edema with some discomfort.
asymptomatic hyperglycemia & hyperkalemia

## 2022-07-21 NOTE — PROGRESS NOTE ADULT - SUBJECTIVE AND OBJECTIVE BOX
Anesthesia Pain Management Service: Post Op  Day _1_ of Epidural    SUBJECTIVE: Patient states he has severe pain on his abdomen. Patient reports using the PCEA but states he is not getting any pain relief from the PCEA. Denies headache, numbness and tingling.  Pain Scale Score: 10/10  Refer to charted pain scores    THERAPY:  [x ] Epidural Bupivacaine 0.0625% and Hydromorphone  		[ X] 10 micrograms/mL	[ ] 5 micrograms/mL  [ ] Epidural Bupivacaine 0.0625% and Fentanyl - 2 micrograms/mL  [ ] Epidural Ropivacaine 0.1% plain – 1 mg/mL  [ ] Patient Controlled Regional Anesthesia (PCRA) Ropivacaine  		[ ] 0.2%			[ ] 0.1%    Demand dose __3_ lockout __15_ (minutes) Continuous Rate __8_ Total: 155.9____ ml used (in past 24 hours)      MEDICATIONS  (STANDING):  acetaminophen   IVPB .. 1000 milliGRAM(s) IV Intermittent once  famotidine Injectable 40 milliGRAM(s) IV Push once  furosemide   Injectable 40 milliGRAM(s) IV Push two times a day  heparin   Injectable 5000 Unit(s) SubCutaneous every 12 hours  hydrALAZINE Injectable 10 milliGRAM(s) IV Push once  HYDROmorphone PCA (1 mG/mL) 30 milliLiter(s) PCA Continuous PCA Continuous  metoprolol tartrate Injectable 5 milliGRAM(s) IV Push every 6 hours  pantoprazole  Injectable 40 milliGRAM(s) IV Push daily  ropivacaine 0.2% in Sodium Chloride PCEA 200 milliLiter(s) Epidural PCA Continuous  sodium bicarbonate  Infusion 0.156 mEq/kG/Hr (75 mL/Hr) IV Continuous <Continuous>  sodium zirconium cyclosilicate 10 Gram(s) Oral three times a day    MEDICATIONS  (PRN):  HYDROmorphone PCA (1 mG/mL) Rescue Clinician Bolus 0.5 milliGRAM(s) IV Push every 15 minutes PRN for Pain Scale GREATER THAN 6  naloxone Injectable 0.1 milliGRAM(s) IV Push every 3 minutes PRN For ANY of the following changes in patient status:  A. RR LESS THAN 10 breaths per minute, B. Oxygen saturation LESS THAN 90%, C. Sedation score of 6  naloxone Injectable 0.1 milliGRAM(s) IV Push every 3 minutes PRN For ANY of the following changes in patient status:  A. RR LESS THAN 10 breaths per minute, B. Oxygen saturation LESS THAN 90%, C. Sedation score of 6  ondansetron Injectable 4 milliGRAM(s) IV Push every 6 hours PRN Nausea  ropivacaine 0.2% in Sodium Chloride PCEA Rescue Clinician Bolus 5 milliLiter(s) Epidural every 15 minutes PRN for Pain Score greater than 6      OBJECTIVE: Patient sitting up in bed. NG tube in place.    Assessment of Catheter Site:	[ ] Left	[ ] Right  [x ] Epidural 	[ ] Femoral	      [ ] Saphenous   [ ] Supraclavicular   [ ] Other:    [x ] Dressing intact	[x ] Site non-tender	[ x] Site without erythema, discharge, edema  [x ] Epidural tubing and connection checked	[x] Gross neurological exam within normal limits  [ ] Catheter removed – tip intact		[ ] Afebrile  	[ ] Febrile: ___   [ X] see Temp under VS below)    PT/INR - ( 21 Jul 2022 08:33 )   PT: 12.0 sec;   INR: 1.03 ratio         PTT - ( 21 Jul 2022 08:33 )  PTT:27.5 sec                      12.5   20.98 )-----------( 284      ( 21 Jul 2022 03:07 )             40.3     Vital Signs Last 24 Hrs  T(C): 37.1 (07-21-22 @ 12:19), Max: 37.4 (07-21-22 @ 08:07)  T(F): 98.8 (07-21-22 @ 12:19), Max: 99.3 (07-21-22 @ 08:07)  HR: 105 (07-21-22 @ 12:19) (87 - 128)  BP: 155/75 (07-21-22 @ 12:19) (133/69 - 181/89)  BP(mean): 95 (07-20-22 @ 22:40) (85 - 117)  RR: 18 (07-21-22 @ 12:19) (10 - 36)  SpO2: 99% (07-21-22 @ 12:19) (95% - 100%)      Sedation Score:	[x ] Alert	[ ] Drowsy	[ ] Arousable	[ ] Asleep	[ ] Unresponsive    Side Effects:	[x ] None	[ ] Nausea	[ ] Vomiting	[ ] Pruritus  		[ ] Weakness		[ ] Numbness	[ ] Other:    ASSESSMENT/ PLAN:    Therapy to  be:	[x ] Continue   [ ] Discontinued   [ ] Change to prn Analgesics    Documentation and Verification of current medications:  [ X ] Done	[ ] Not done, not eligible, reason:    Comments:  Patient currently with no IV access. Given a rescue bolus of 5ml on PCEA. On reassessment patient states his pain is a 9/10 and the bolus dose did not help him much. On level check patient with coverage from T7-9. Discussed with primary team. Changed PCEA to Ropivacaine and added IV Dilaudid PCA.  Progress Note written now but Patient was seen earlier.

## 2022-07-21 NOTE — CHART NOTE - NSCHARTNOTEFT_GEN_A_CORE
Called to bedside for critical potassium value of 6.8. Patient asymptomatic except systolic bp in 180s, HR at 90. Patient has no complaints outside of post-operative incisional pain. Denies palpitations, extremity pain, paresthesias, chest pain, shortness of breath.    Vital Signs Last 24 Hrs  T(C): 37.1 (21 Jul 2022 18:02), Max: 37.4 (21 Jul 2022 08:07)  T(F): 98.7 (21 Jul 2022 18:02), Max: 99.3 (21 Jul 2022 08:07)  HR: 94 (21 Jul 2022 18:02) (87 - 119)  BP: 180/93 (21 Jul 2022 18:02) (150/75 - 182/91)  BP(mean): 95 (20 Jul 2022 22:40) (95 - 117)  RR: 18 (21 Jul 2022 18:02) (10 - 19)  SpO2: 99% (21 Jul 2022 18:02) (95% - 100%)    Parameters below as of 21 Jul 2022 18:02  Patient On (Oxygen Delivery Method): room air        On exam abd soft, appropriately tender, preveena vac in place over incision,     Plan:  EKG, calcium gluconate, 5u insulin and dextrose ordered. 10mg IV labetalol ordered for blood pressure control.     Will continue to monitor patient closely.

## 2022-07-21 NOTE — PROGRESS NOTE ADULT - SUBJECTIVE AND OBJECTIVE BOX
Day __2_ of Anesthesia Pain Management Service    SUBJECTIVE:    Therapy:	  [ ] IV PCA	   [x ] Epidural           [ ] s/p Spinal Opoid              [ ] Postpartum infusion	  [ ] Patient controlled regional anesthesia (PCRA)    [ ] prn Analgesics    OBJECTIVE:   [ ] No new signs     [x ] Other: c/o pain    Side Effects:  [ x] None			[ ] Other:    Assessment of Catheter Site:		[x ] Intact		[ ] Other:    ASSESSMENT/PLAN  [ ] Continue current therapy    [x ] Therapy changed to:    [x ] IV PCA       [ x] Epidural with Ropivacaine    [ ] prn Analgesics     Comments:

## 2022-07-22 LAB
AMYLASE FLD-CCNC: 131 U/L — SIGNIFICANT CHANGE UP
ANION GAP SERPL CALC-SCNC: 11 MMOL/L — SIGNIFICANT CHANGE UP (ref 7–14)
ANION GAP SERPL CALC-SCNC: 12 MMOL/L — SIGNIFICANT CHANGE UP (ref 7–14)
ANION GAP SERPL CALC-SCNC: 3 MMOL/L — LOW (ref 7–14)
APTT BLD: 26.4 SEC — LOW (ref 27–36.3)
BUN SERPL-MCNC: 36 MG/DL — HIGH (ref 7–23)
BUN SERPL-MCNC: 41 MG/DL — HIGH (ref 7–23)
BUN SERPL-MCNC: 42 MG/DL — HIGH (ref 7–23)
CALCIUM SERPL-MCNC: 8.7 MG/DL — SIGNIFICANT CHANGE UP (ref 8.4–10.5)
CALCIUM SERPL-MCNC: 9.1 MG/DL — SIGNIFICANT CHANGE UP (ref 8.4–10.5)
CALCIUM SERPL-MCNC: 9.3 MG/DL — SIGNIFICANT CHANGE UP (ref 8.4–10.5)
CHLORIDE SERPL-SCNC: 100 MMOL/L — SIGNIFICANT CHANGE UP (ref 98–107)
CHLORIDE SERPL-SCNC: 102 MMOL/L — SIGNIFICANT CHANGE UP (ref 98–107)
CHLORIDE SERPL-SCNC: 99 MMOL/L — SIGNIFICANT CHANGE UP (ref 98–107)
CO2 SERPL-SCNC: 25 MMOL/L — SIGNIFICANT CHANGE UP (ref 22–31)
CO2 SERPL-SCNC: 25 MMOL/L — SIGNIFICANT CHANGE UP (ref 22–31)
CO2 SERPL-SCNC: 36 MMOL/L — HIGH (ref 22–31)
CREAT SERPL-MCNC: 2.21 MG/DL — HIGH (ref 0.5–1.3)
CREAT SERPL-MCNC: 2.28 MG/DL — HIGH (ref 0.5–1.3)
CREAT SERPL-MCNC: 2.31 MG/DL — HIGH (ref 0.5–1.3)
EGFR: 31 ML/MIN/1.73M2 — LOW
EGFR: 32 ML/MIN/1.73M2 — LOW
EGFR: 33 ML/MIN/1.73M2 — LOW
GLUCOSE BLDC GLUCOMTR-MCNC: 148 MG/DL — HIGH (ref 70–99)
GLUCOSE BLDC GLUCOMTR-MCNC: 165 MG/DL — HIGH (ref 70–99)
GLUCOSE SERPL-MCNC: 121 MG/DL — HIGH (ref 70–99)
GLUCOSE SERPL-MCNC: 136 MG/DL — HIGH (ref 70–99)
GLUCOSE SERPL-MCNC: 244 MG/DL — HIGH (ref 70–99)
HCT VFR BLD CALC: 37.3 % — LOW (ref 39–50)
HCT VFR BLD CALC: 38.1 % — LOW (ref 39–50)
HGB BLD-MCNC: 12 G/DL — LOW (ref 13–17)
HGB BLD-MCNC: 12.1 G/DL — LOW (ref 13–17)
INR BLD: 1.01 RATIO — SIGNIFICANT CHANGE UP (ref 0.88–1.16)
MAGNESIUM SERPL-MCNC: 1.5 MG/DL — LOW (ref 1.6–2.6)
MAGNESIUM SERPL-MCNC: 1.7 MG/DL — SIGNIFICANT CHANGE UP (ref 1.6–2.6)
MAGNESIUM SERPL-MCNC: 1.7 MG/DL — SIGNIFICANT CHANGE UP (ref 1.6–2.6)
MCHC RBC-ENTMCNC: 30.4 PG — SIGNIFICANT CHANGE UP (ref 27–34)
MCHC RBC-ENTMCNC: 30.9 PG — SIGNIFICANT CHANGE UP (ref 27–34)
MCHC RBC-ENTMCNC: 31.5 GM/DL — LOW (ref 32–36)
MCHC RBC-ENTMCNC: 32.4 GM/DL — SIGNIFICANT CHANGE UP (ref 32–36)
MCV RBC AUTO: 95.2 FL — SIGNIFICANT CHANGE UP (ref 80–100)
MCV RBC AUTO: 96.5 FL — SIGNIFICANT CHANGE UP (ref 80–100)
NON-GYNECOLOGICAL CYTOLOGY STUDY: SIGNIFICANT CHANGE UP
NRBC # BLD: 0 /100 WBCS — SIGNIFICANT CHANGE UP
NRBC # BLD: 0 /100 WBCS — SIGNIFICANT CHANGE UP
NRBC # FLD: 0 K/UL — SIGNIFICANT CHANGE UP
NRBC # FLD: 0 K/UL — SIGNIFICANT CHANGE UP
PHOSPHATE SERPL-MCNC: 4.3 MG/DL — SIGNIFICANT CHANGE UP (ref 2.5–4.5)
PHOSPHATE SERPL-MCNC: 4.5 MG/DL — SIGNIFICANT CHANGE UP (ref 2.5–4.5)
PHOSPHATE SERPL-MCNC: 4.7 MG/DL — HIGH (ref 2.5–4.5)
PLATELET # BLD AUTO: 253 K/UL — SIGNIFICANT CHANGE UP (ref 150–400)
PLATELET # BLD AUTO: 265 K/UL — SIGNIFICANT CHANGE UP (ref 150–400)
POTASSIUM SERPL-MCNC: 5 MMOL/L — SIGNIFICANT CHANGE UP (ref 3.5–5.3)
POTASSIUM SERPL-MCNC: 5.5 MMOL/L — HIGH (ref 3.5–5.3)
POTASSIUM SERPL-MCNC: 5.8 MMOL/L — HIGH (ref 3.5–5.3)
POTASSIUM SERPL-SCNC: 5 MMOL/L — SIGNIFICANT CHANGE UP (ref 3.5–5.3)
POTASSIUM SERPL-SCNC: 5.5 MMOL/L — HIGH (ref 3.5–5.3)
POTASSIUM SERPL-SCNC: 5.8 MMOL/L — HIGH (ref 3.5–5.3)
PROTHROM AB SERPL-ACNC: 11.7 SEC — SIGNIFICANT CHANGE UP (ref 10.5–13.4)
RBC # BLD: 3.92 M/UL — LOW (ref 4.2–5.8)
RBC # BLD: 3.95 M/UL — LOW (ref 4.2–5.8)
RBC # FLD: 18.6 % — HIGH (ref 10.3–14.5)
RBC # FLD: 18.9 % — HIGH (ref 10.3–14.5)
SODIUM SERPL-SCNC: 137 MMOL/L — SIGNIFICANT CHANGE UP (ref 135–145)
SODIUM SERPL-SCNC: 138 MMOL/L — SIGNIFICANT CHANGE UP (ref 135–145)
SODIUM SERPL-SCNC: 138 MMOL/L — SIGNIFICANT CHANGE UP (ref 135–145)
TROPONIN T, HIGH SENSITIVITY RESULT: 32 NG/L — SIGNIFICANT CHANGE UP
TROPONIN T, HIGH SENSITIVITY RESULT: 33 NG/L — SIGNIFICANT CHANGE UP
WBC # BLD: 12.32 K/UL — HIGH (ref 3.8–10.5)
WBC # BLD: 15.68 K/UL — HIGH (ref 3.8–10.5)
WBC # FLD AUTO: 12.32 K/UL — HIGH (ref 3.8–10.5)
WBC # FLD AUTO: 15.68 K/UL — HIGH (ref 3.8–10.5)

## 2022-07-22 PROCEDURE — 71045 X-RAY EXAM CHEST 1 VIEW: CPT | Mod: 26,77

## 2022-07-22 PROCEDURE — 71045 X-RAY EXAM CHEST 1 VIEW: CPT | Mod: 26

## 2022-07-22 PROCEDURE — 99233 SBSQ HOSP IP/OBS HIGH 50: CPT | Mod: GC

## 2022-07-22 PROCEDURE — 93010 ELECTROCARDIOGRAM REPORT: CPT

## 2022-07-22 RX ORDER — ACETAMINOPHEN 500 MG
1000 TABLET ORAL ONCE
Refills: 0 | Status: DISCONTINUED | OUTPATIENT
Start: 2022-07-22 | End: 2022-07-22

## 2022-07-22 RX ORDER — ACETAMINOPHEN 500 MG
650 TABLET ORAL EVERY 6 HOURS
Refills: 0 | Status: COMPLETED | OUTPATIENT
Start: 2022-07-22 | End: 2022-07-24

## 2022-07-22 RX ORDER — SODIUM ZIRCONIUM CYCLOSILICATE 10 G/10G
10 POWDER, FOR SUSPENSION ORAL EVERY 8 HOURS
Refills: 0 | Status: DISCONTINUED | OUTPATIENT
Start: 2022-07-22 | End: 2022-07-22

## 2022-07-22 RX ORDER — OXYCODONE HYDROCHLORIDE 5 MG/1
10 TABLET ORAL
Refills: 0 | Status: DISCONTINUED | OUTPATIENT
Start: 2022-07-22 | End: 2022-07-25

## 2022-07-22 RX ORDER — NIFEDIPINE 30 MG
30 TABLET, EXTENDED RELEASE 24 HR ORAL ONCE
Refills: 0 | Status: COMPLETED | OUTPATIENT
Start: 2022-07-22 | End: 2022-07-22

## 2022-07-22 RX ORDER — ROPIVACAINE HCL/PF 5 MG/ML
200 AMPUL (ML) INJECTION
Refills: 0 | Status: DISCONTINUED | OUTPATIENT
Start: 2022-07-22 | End: 2022-07-22

## 2022-07-22 RX ORDER — ROPIVACAINE HCL/PF 5 MG/ML
200 AMPUL (ML) INJECTION
Refills: 0 | Status: DISCONTINUED | OUTPATIENT
Start: 2022-07-22 | End: 2022-07-24

## 2022-07-22 RX ORDER — DEXTROSE 50 % IN WATER 50 %
50 SYRINGE (ML) INTRAVENOUS ONCE
Refills: 0 | Status: COMPLETED | OUTPATIENT
Start: 2022-07-22 | End: 2022-07-22

## 2022-07-22 RX ORDER — OXYCODONE HYDROCHLORIDE 5 MG/1
15 TABLET ORAL
Refills: 0 | Status: DISCONTINUED | OUTPATIENT
Start: 2022-07-22 | End: 2022-07-25

## 2022-07-22 RX ORDER — INSULIN HUMAN 100 [IU]/ML
5 INJECTION, SOLUTION SUBCUTANEOUS ONCE
Refills: 0 | Status: COMPLETED | OUTPATIENT
Start: 2022-07-22 | End: 2022-07-22

## 2022-07-22 RX ADMIN — HYDROMORPHONE HYDROCHLORIDE 30 MILLILITER(S): 2 INJECTION INTRAMUSCULAR; INTRAVENOUS; SUBCUTANEOUS at 07:16

## 2022-07-22 RX ADMIN — INSULIN HUMAN 5 UNIT(S): 100 INJECTION, SOLUTION SUBCUTANEOUS at 06:27

## 2022-07-22 RX ADMIN — OXYCODONE HYDROCHLORIDE 15 MILLIGRAM(S): 5 TABLET ORAL at 13:53

## 2022-07-22 RX ADMIN — OXYCODONE HYDROCHLORIDE 15 MILLIGRAM(S): 5 TABLET ORAL at 16:05

## 2022-07-22 RX ADMIN — HEPARIN SODIUM 5000 UNIT(S): 5000 INJECTION INTRAVENOUS; SUBCUTANEOUS at 06:26

## 2022-07-22 RX ADMIN — PANTOPRAZOLE SODIUM 40 MILLIGRAM(S): 20 TABLET, DELAYED RELEASE ORAL at 12:50

## 2022-07-22 RX ADMIN — SODIUM ZIRCONIUM CYCLOSILICATE 10 GRAM(S): 10 POWDER, FOR SUSPENSION ORAL at 16:05

## 2022-07-22 RX ADMIN — Medication 200 MILLILITER(S): at 19:05

## 2022-07-22 RX ADMIN — Medication 50 MILLILITER(S): at 06:28

## 2022-07-22 RX ADMIN — Medication 650 MILLIGRAM(S): at 18:40

## 2022-07-22 RX ADMIN — HEPARIN SODIUM 5000 UNIT(S): 5000 INJECTION INTRAVENOUS; SUBCUTANEOUS at 18:40

## 2022-07-22 RX ADMIN — Medication 40 MILLIGRAM(S): at 16:04

## 2022-07-22 RX ADMIN — OXYCODONE HYDROCHLORIDE 15 MILLIGRAM(S): 5 TABLET ORAL at 12:50

## 2022-07-22 RX ADMIN — Medication 75 MEQ/KG/HR: at 04:08

## 2022-07-22 RX ADMIN — Medication 650 MILLIGRAM(S): at 13:52

## 2022-07-22 RX ADMIN — OXYCODONE HYDROCHLORIDE 15 MILLIGRAM(S): 5 TABLET ORAL at 20:45

## 2022-07-22 RX ADMIN — Medication 20 MILLIGRAM(S): at 00:30

## 2022-07-22 RX ADMIN — Medication 200 MILLILITER(S): at 18:24

## 2022-07-22 RX ADMIN — Medication 650 MILLIGRAM(S): at 12:50

## 2022-07-22 RX ADMIN — Medication 30 MILLIGRAM(S): at 18:40

## 2022-07-22 RX ADMIN — Medication 200 MILLILITER(S): at 07:17

## 2022-07-22 RX ADMIN — Medication 200 MILLILITER(S): at 12:31

## 2022-07-22 RX ADMIN — OXYCODONE HYDROCHLORIDE 15 MILLIGRAM(S): 5 TABLET ORAL at 17:00

## 2022-07-22 RX ADMIN — Medication 40 MILLIGRAM(S): at 06:26

## 2022-07-22 NOTE — PROGRESS NOTE ADULT - SUBJECTIVE AND OBJECTIVE BOX
Anesthesia Pain Management Service: Day 3__ of Epidural + IV PCA    SUBJECTIVE: Patient doing well with Continuous Epidural Infusion, but feels that the IV PCA is not helping.  Patient prefers to take Oxycodone 15mg which he states he was taking at home 3 times a day.  As per i-stop, patient was prescribed Percocet 10-325mg tablets, dispensed 6/22/2022, quantity 168, 28 day supply.    Pain Scale Score: >10/10  Refer to charted pain scores    THERAPY:  [ ] Epidural Bupivacaine 0.0625% and Hydromorphone  		[ ] 10 micrograms/mL	[ ] 5 micrograms/mL  [ ] Epidural Bupivacaine 0.0625% and Fentanyl - 2 micrograms/mL  [x ] Epidural Ropivacaine 0.2% plain – 2 mg/mL  [ ] Patient Controlled Regional Anesthesia (PCRA) Ropivacaine  		[ ] 0.2%			[ ] 0.1%    Demand dose __0_ lockout __0_ (minutes) Continuous Rate __6ml/hr_ Total: ___133.4_ ml used/in 24 hr    PLUS    IV PCA Hydromorphone 1mg/ml : Demand Dose__0.2mg_   Lockout__6__(minutes)    Continuous Rate_0_    Total: 12.2__mg used/ 24 hr    MEDICATIONS  (STANDING):  acetaminophen     Tablet .. 650 milliGRAM(s) Oral every 6 hours  famotidine Injectable 40 milliGRAM(s) IV Push once  furosemide   Injectable 40 milliGRAM(s) IV Push two times a day  heparin   Injectable 5000 Unit(s) SubCutaneous every 12 hours  hydrALAZINE Injectable 10 milliGRAM(s) IV Push once  pantoprazole  Injectable 40 milliGRAM(s) IV Push daily  ropivacaine 0.2% in Sodium Chloride PCEA 200 milliLiter(s) Epidural PCA Continuous  sodium zirconium cyclosilicate 10 Gram(s) Oral every 8 hours    MEDICATIONS  (PRN):  metoprolol tartrate Injectable 5 milliGRAM(s) IV Push every 6 hours PRN SBP > 180  naloxone Injectable 0.1 milliGRAM(s) IV Push every 3 minutes PRN For ANY of the following changes in patient status:  A. RR LESS THAN 10 breaths per minute, B. Oxygen saturation LESS THAN 90%, C. Sedation score of 6  ondansetron Injectable 4 milliGRAM(s) IV Push every 6 hours PRN Nausea  oxyCODONE    IR 10 milliGRAM(s) Oral every 3 hours PRN Moderate Pain (4 - 6)  oxyCODONE    IR 15 milliGRAM(s) Oral every 3 hours PRN Severe Pain (7 - 10)  ropivacaine 0.2% in Sodium Chloride PCEA Rescue Clinician Bolus 5 milliLiter(s) Epidural every 15 minutes PRN for Pain Score greater than 6      OBJECTIVE:  Patient is sitting in chair complaining of pain.     Assessment of Catheter Site:	[ ] Left	[ ] Right  [x ] Epidural 	[ ] Femoral	      [ ] Saphenous   [ ] Supraclavicular   [ ] Other:    [x ] Dressing intact	[x ] Site non-tender	[ x] Site without erythema, discharge, edema  [x ] Epidural tubing and connection checked	[x] Gross neurological exam within normal limits  [ ] Catheter removed – tip intact		[ ] Afebrile	    [ ] Febrile: ___    [ X] see Temp under VS below)    PT/INR - ( 22 Jul 2022 06:47 )   PT: 11.7 sec;   INR: 1.01 ratio         PTT - ( 22 Jul 2022 06:47 )  PTT:26.4 sec                      12.1   12.32 )-----------( 253      ( 22 Jul 2022 06:47 )             37.3     Vital Signs Last 24 Hrs  T(C): 37.1 (07-22-22 @ 12:25), Max: 37.2 (07-21-22 @ 16:15)  T(F): 98.8 (07-22-22 @ 12:25), Max: 98.9 (07-21-22 @ 16:15)  HR: 105 (07-22-22 @ 12:25) (84 - 105)  BP: 174/95 (07-22-22 @ 12:25) (174/95 - 193/98)  BP(mean): --  RR: 18 (07-22-22 @ 12:25) (18 - 18)  SpO2: 100% (07-22-22 @ 12:25) (95% - 100%)      Sedation Score:	[x ] Alert	[ ] Drowsy	[ ] Arousable	[ ] Asleep	[ ] Unresponsive    Side Effects:	[x ] None	[ ] Nausea	[ ] Vomiting	[ ] Pruritus  		[ ] Weakness		[ ] Numbness	[ ] Other:    ASSESSMENT/ PLAN:    Therapy to  be:	[x ] Continue   [x ] Discontinued   [ ] Change to prn Analgesics    Documentation and Verification of current medications:  [ X ] Done	[ ] Not done, not eligible, reason:    Comments: Doing OK with epidural + will discontinue IV PCA and transition patient to oral oxycodone PRN for better relief.    Progress Note written now but Patient was seen earlier. Anesthesia Pain Management Service: Day 3__ of Epidural + IV PCA    SUBJECTIVE: Patient doing well with Continuous Epidural Infusion, but feels that the IV PCA is not helping.  Patient prefers to take Oxycodone 15mg which he states he was taking at home 3 times a day.  As per i-stop, patient was prescribed Percocet 10-325mg tablets, dispensed 6/22/2022, quantity 168, 28 day supply.    Pain Scale Score: >10/10  Refer to charted pain scores    THERAPY:  [ ] Epidural Bupivacaine 0.0625% and Hydromorphone  		[ ] 10 micrograms/mL	[ ] 5 micrograms/mL  [ ] Epidural Bupivacaine 0.0625% and Fentanyl - 2 micrograms/mL  [x ] Epidural Ropivacaine 0.2% plain – 2 mg/mL  [ ] Patient Controlled Regional Anesthesia (PCRA) Ropivacaine  		[ ] 0.2%			[ ] 0.1%    Demand dose __0_ lockout __0_ (minutes) Continuous Rate __6ml/hr_ Total: ___133.4_ ml used/in 24 hr    PLUS    IV PCA Hydromorphone 1mg/ml : Demand Dose__0.2mg_   Lockout__6__(minutes)    Continuous Rate_0_    Total: 12.2__mg used/ 24 hr    MEDICATIONS  (STANDING):  acetaminophen     Tablet .. 650 milliGRAM(s) Oral every 6 hours  famotidine Injectable 40 milliGRAM(s) IV Push once  furosemide   Injectable 40 milliGRAM(s) IV Push two times a day  heparin   Injectable 5000 Unit(s) SubCutaneous every 12 hours  hydrALAZINE Injectable 10 milliGRAM(s) IV Push once  pantoprazole  Injectable 40 milliGRAM(s) IV Push daily  ropivacaine 0.2% in Sodium Chloride PCEA 200 milliLiter(s) Epidural PCA Continuous  sodium zirconium cyclosilicate 10 Gram(s) Oral every 8 hours    MEDICATIONS  (PRN):  metoprolol tartrate Injectable 5 milliGRAM(s) IV Push every 6 hours PRN SBP > 180  naloxone Injectable 0.1 milliGRAM(s) IV Push every 3 minutes PRN For ANY of the following changes in patient status:  A. RR LESS THAN 10 breaths per minute, B. Oxygen saturation LESS THAN 90%, C. Sedation score of 6  ondansetron Injectable 4 milliGRAM(s) IV Push every 6 hours PRN Nausea  oxyCODONE    IR 10 milliGRAM(s) Oral every 3 hours PRN Moderate Pain (4 - 6)  oxyCODONE    IR 15 milliGRAM(s) Oral every 3 hours PRN Severe Pain (7 - 10)  ropivacaine 0.2% in Sodium Chloride PCEA Rescue Clinician Bolus 5 milliLiter(s) Epidural every 15 minutes PRN for Pain Score greater than 6      OBJECTIVE:  Patient is sitting in chair complaining of pain.     Assessment of Catheter Site:	[ ] Left	[ ] Right  [x ] Epidural 	[ ] Femoral	      [ ] Saphenous   [ ] Supraclavicular   [ ] Other:    [x ] Dressing intact	[x ] Site non-tender	[ x] Site without erythema, discharge, edema  [x ] Epidural tubing and connection checked	[x] Gross neurological exam within normal limits  [ ] Catheter removed – tip intact		[ ] Afebrile	    [ ] Febrile: ___    [ X] see Temp under VS below)    PT/INR - ( 22 Jul 2022 06:47 )   PT: 11.7 sec;   INR: 1.01 ratio         PTT - ( 22 Jul 2022 06:47 )  PTT:26.4 sec                      12.1   12.32 )-----------( 253      ( 22 Jul 2022 06:47 )             37.3     Vital Signs Last 24 Hrs  T(C): 37.1 (07-22-22 @ 12:25), Max: 37.2 (07-21-22 @ 16:15)  T(F): 98.8 (07-22-22 @ 12:25), Max: 98.9 (07-21-22 @ 16:15)  HR: 105 (07-22-22 @ 12:25) (84 - 105)  BP: 174/95 (07-22-22 @ 12:25) (174/95 - 193/98)  BP(mean): --  RR: 18 (07-22-22 @ 12:25) (18 - 18)  SpO2: 100% (07-22-22 @ 12:25) (95% - 100%)      Sedation Score:	[x ] Alert	[ ] Drowsy	[ ] Arousable	[ ] Asleep	[ ] Unresponsive    Side Effects:	[x ] None	[ ] Nausea	[ ] Vomiting	[ ] Pruritus  		[ ] Weakness		[ ] Numbness	[ ] Other:    ASSESSMENT/ PLAN:    Therapy to  be:	[x ] Continue   [x ] Discontinued   [ ] Change to prn Analgesics    Documentation and Verification of current medications:  [ X ] Done	[ ] Not done, not eligible, reason:    Comments: Doing OK with epidural + will discontinue IV PCA and transition patient to oral oxycodone PRN for better relief.  Increased continuous rate to 8 ml/hr.    Progress Note written now but Patient was seen earlier.

## 2022-07-22 NOTE — PROGRESS NOTE ADULT - ASSESSMENT
61M with PMHx of ETOH abuse, chronic pancreatitis with chronic PD dilation, PD stone and calcifications, perforated gastric ulcer s/p exlap with David patch repair (2019, Dr. Murray), CKD4, hep C s/p SVR and benign esophageal stricture (post-stent 4/19/22 c/b need for CRE balloon dilation, reposition and suture on 4/29) presents with a flare of chronic pancreatitis. Patient is s/p puestow procedure on 7/20    Plan/Recommendations:  - Medicine consult for hypertension  - Diet: clear  - Pain control with PCA and PCEA  - DC chen and f/u TOV  - FU nephrology recommendations for hyperkalemia and CKD  - BMP q12   - Monitor vital signs  - DVT prophylaxis

## 2022-07-22 NOTE — PROGRESS NOTE ADULT - SUBJECTIVE AND OBJECTIVE BOX
[facility] Division of Kidney Diseases & Hypertension  FOLLOW UP NOTE  [phone]--------------------------------------------------------------------------------  HPI:  62 yo Male with PMHx of chronic pancreatitis (complicated by chronic pain of which secondary to alcohol abuse), CKD4, hep C s/p SVR and benign esophageal stricture (post-stent 4/19/22 c/b need for CRE balloon dilation, reposition and suture on 4/29) presents with epigastric pain. Now s/p Puestow procedure. Pt being seen for persistent hyperkalemia.    Pt now on clear liquid diet. Reports chest wall pain. Denies any headaches, nausea, vomiting, fevers/chills, chest pain, palpitations, SOB, abdominal pain, and leg swelling.       PAST HISTORY  --------------------------------------------------------------------------------  No significant changes to PMH, PSH, FHx, SHx, unless otherwise noted    ALLERGIES & MEDICATIONS  --------------------------------------------------------------------------------  Allergies    No Known Allergies    Intolerances      Standing Inpatient Medications  acetaminophen     Tablet .. 650 milliGRAM(s) Oral every 6 hours  famotidine Injectable 40 milliGRAM(s) IV Push once  furosemide   Injectable 40 milliGRAM(s) IV Push two times a day  heparin   Injectable 5000 Unit(s) SubCutaneous every 12 hours  hydrALAZINE Injectable 10 milliGRAM(s) IV Push once  pantoprazole  Injectable 40 milliGRAM(s) IV Push daily  ropivacaine 0.2% in Sodium Chloride PCEA 200 milliLiter(s) Epidural PCA Continuous  sodium zirconium cyclosilicate 10 Gram(s) Oral every 8 hours    PRN Inpatient Medications  metoprolol tartrate Injectable 5 milliGRAM(s) IV Push every 6 hours PRN  naloxone Injectable 0.1 milliGRAM(s) IV Push every 3 minutes PRN  ondansetron Injectable 4 milliGRAM(s) IV Push every 6 hours PRN  oxyCODONE    IR 10 milliGRAM(s) Oral every 3 hours PRN  oxyCODONE    IR 15 milliGRAM(s) Oral every 3 hours PRN  ropivacaine 0.2% in Sodium Chloride PCEA Rescue Clinician Bolus 5 milliLiter(s) Epidural every 15 minutes PRN      REVIEW OF SYSTEMS  --------------------------------------------------------------------------------  Gen: No  fevers/chills  Skin: No rashes  Head/Eyes/Ears/Mouth: No headache  Respiratory: No dyspnea  CV: Positive for chest wall pain  GI: No abdominal pain  MSK: Positive for chest wall pain, no edema  Neuro: No dizziness/lightheadedness    All other systems were reviewed and are negative, except as noted.    VITALS/PHYSICAL EXAM  --------------------------------------------------------------------------------  T(C): 37.1 (07-22-22 @ 12:25), Max: 37.2 (07-21-22 @ 16:15)  HR: 105 (07-22-22 @ 12:25) (84 - 105)  BP: 174/95 (07-22-22 @ 12:25) (174/95 - 193/98)  RR: 18 (07-22-22 @ 12:25) (18 - 18)  SpO2: 100% (07-22-22 @ 12:25) (95% - 100%)  Wt(kg): --      07-21-22 @ 07:01  -  07-22-22 @ 07:00  --------------------------------------------------------  IN: 0 mL / OUT: 2590 mL / NET: -2590 mL    07-22-22 @ 07:01  -  07-22-22 @ 13:38  --------------------------------------------------------  IN: 0 mL / OUT: 205 mL / NET: -205 mL      Physical Exam:  Gen: In moderate distress due to pain  Pulm: CTA B/L  CV: RRR, S1S2;  Abd: +BS, soft, nontender/nondistended  : No suprapubic tenderness  Extremities: no bilateral LE edema noted  Neuro: No focal deficits, intact gait  Skin: Warm, without rashes  Vascular access:    LABS/STUDIES  --------------------------------------------------------------------------------              12.1   12.32 >-----------<  253      [07-22-22 @ 06:47]              37.3     137  |  100  |  42  ----------------------------<  244      [07-22-22 @ 06:47]  5.5   |  25  |  2.28        Ca     9.1     [07-22-22 @ 06:47]      Mg     1.70     [07-22-22 @ 06:47]      Phos  4.5     [07-22-22 @ 06:47]      PT/INR: PT 11.7 , INR 1.01       [07-22-22 @ 06:47]  PTT: 26.4       [07-22-22 @ 06:47]      Creatinine Trend:  SCr 2.28 [07-22 @ 06:47]  SCr 2.31 [07-22 @ 00:35]  SCr 2.34 [07-21 @ 16:05]  SCr 2.27 [07-21 @ 06:15]  SCr 2.20 [07-21 @ 03:07]    Urinalysis - [07-20-22 @ 08:00]      Color Light Yellow / Appearance Clear / SG 1.015 / pH 6.5      Gluc Negative / Ketone Negative  / Bili Negative / Urobili <2 mg/dL       Blood Negative / Protein Negative / Leuk Est Negative / Nitrite Negative      RBC  / WBC  / Hyaline  / Gran  / Sq Epi  / Non Sq Epi  / Bacteria     Urine Creatinine 52      [07-20-22 @ 08:00]  Urine Protein 8      [07-20-22 @ 08:00]

## 2022-07-22 NOTE — CHART NOTE - NSCHARTNOTEFT_GEN_A_CORE
Returned to evaluate patient after potassium shift treatment. BMP still unable to be drawn due to patient being a difficult stick per nursing team, HAYDE team contacted for lab draw. However, patient now endorsed chest pain in the center and left chest. Believes it feels like someone is hitting him in the chest. Returned to evaluate patient after potassium shift treatment. BMP still unable to be drawn due to patient being a difficult stick per nursing team, HAYDE team contacted for lab draw. However, patient now endorsed chest pain in the center and left chest. Believes it feels like someone is hitting him in the chest. denies SOB, dizziness, changes in vision, headache, fevers, chills.    Exam:    CVA: RRR,S1 S2  Resp: no increased WOB  Abd: preveena vac in place over incision, NTND      LAbs: CBC,BMP,EKG, trops, CXR ordered      Plan:    - f/u labs   - ekg no ST changes, unchanged from previous   - pain control PRN

## 2022-07-22 NOTE — CHART NOTE - NSCHARTNOTEFT_GEN_A_CORE
Called by primary team for recs on uncontrolled BP  - vitals reviewed with consistent uncontrolled HTN  -- will add Nifedipine 30mg daily and monitor BP  - Discussed with primary team

## 2022-07-22 NOTE — PROGRESS NOTE ADULT - SUBJECTIVE AND OBJECTIVE BOX
Day __4_ of Anesthesia Pain Management Service    SUBJECTIVE:    Therapy:	  [ ] IV PCA	   [ x] Epidural           [ ] s/p Spinal Opoid              [ ] Postpartum infusion	  [ ] Patient controlled regional anesthesia (PCRA)    [ ] prn Analgesics    OBJECTIVE:   [ x] No new signs     [ ] Other:    Side Effects:  [x ] None			[ ] Other:    Assessment of Catheter Site:		[x ] Intact		[ ] Other:    ASSESSMENT/PLAN  [x ] Continue current therapy    [ ] Therapy changed to:    [ ] IV PCA       [ ] Epidural     [ ] prn Analgesics     Comments:

## 2022-07-22 NOTE — CHART NOTE - NSCHARTNOTEFT_GEN_A_CORE
During attempted blood draw patient had sudden onset stridorous breathing for 10-15 seconds before being able to inhale without difficulty. Patient was tahycardic to 114 during the episode. Resolved on its on and patient O2 sat never dropped below 97%. Patient explained it felt like his throat was closed and then it finally opened. Believes it felt similar to his chronic hiccups but longer in duration. Felt better immediately after episode resolved.        Exam:    Vitals:   Vital Signs Last 24 Hrs  T(C): 37.2 (22 Jul 2022 16:30), Max: 37.2 (22 Jul 2022 16:30)  T(F): 99 (22 Jul 2022 16:30), Max: 99 (22 Jul 2022 16:30)  HR: 114  BP: 148/88 (22 Jul 2022 16:30) (148/88 - 186/95)  BP(mean): --  RR: 18 (22 Jul 2022 16:30) (18 - 18)  SpO2: 98% (22 Jul 2022 16:30) (98% - 100%)    Parameters below as of 22 Jul 2022 16:30  Patient On (Oxygen Delivery Method): room air        Pulm: bilateral breath sounds clear, possible crackles appreciated but difficult to assess d/t patient chronic hiccups.  CV: RRR, S1 and S2 appreciated        Plan:  - CXR ordered will f/u  - patient currently stable and now asymptomatic, possibly due to intermittent spasm?  - if has further difficultly breathing, duoneb for resp support  - possibly could use more diuresis pending CXR read, patient has 1 kidney and is being followed by nephro

## 2022-07-22 NOTE — PROGRESS NOTE ADULT - PROBLEM SELECTOR PLAN 2
Stage 4 CKD, baseline creatinine around 2.5-3.0. Currently appears to be at baseline. Patient does not currently have a nephrologist. Unclear etiology of CKD at this time. Urine output ~2.3L yesterday. Repeat UA without protein. Continue to monitor I/Os. Avoid nephrotoxic medications and dose medications as per eGFR.      If you have any questions, please feel free to contact me  Treva Mohr  Nephrology Fellow  [phone] / [employer] Teams(Preferred)  (After 5pm or on weekends please page the on-call fellow)

## 2022-07-22 NOTE — PROGRESS NOTE ADULT - SUBJECTIVE AND OBJECTIVE BOX
TEAM [ D ] Surgery Daily Progress Note  =====================================================    SUBJECTIVE: Overnight, patient was shifted for hyperkalemia. Patient experienced chest pain, troponin and EKG was ordered. This morning, patient states he is in pain without adequate control from PCEA and PCA. Patient is requesting PO pain medication. Patient is passing gas and having bowel movements. NGT was self-dc yesterday. Denies fever, chills, N/V, current chest pain, SOB    VITAL SIGNS:  ICU Vital Signs Last 24 Hrs  T(C): 36.5 (22 Jul 2022 08:02), Max: 37.2 (21 Jul 2022 16:15)  T(F): 97.7 (22 Jul 2022 08:02), Max: 98.9 (21 Jul 2022 16:15)  HR: 90 (22 Jul 2022 08:02) (84 - 105)  BP: 178/103 (22 Jul 2022 08:02) (155/75 - 193/98)  BP(mean): --  ABP: --  ABP(mean): --  RR: 18 (22 Jul 2022 08:02) (18 - 18)  SpO2: 99% (22 Jul 2022 08:02) (95% - 100%)    O2 Parameters below as of 22 Jul 2022 08:02  Patient On (Oxygen Delivery Method): room air    INS AND OUTS  I&O's Detail    21 Jul 2022 07:01  -  22 Jul 2022 07:00  --------------------------------------------------------  IN:  Total IN: 0 mL    OUT:    Bulb (mL): 15 mL    Indwelling Catheter - Urethral (mL): 2375 mL    Nasogastric/Oral tube (mL): 200 mL  Total OUT: 2590 mL    Total NET: -2590 mL      EXAM    General: NAD, resting in bed comfortably.  Cardiac: regular rate, warm and well perfused  Respiratory: Nonlabored respirations, normal cw expansion.  Abdomen: soft, nontender, nondistended, midline prevena, CINDY x 1 SS  Extremities: normal strength, FROM, no deformities    LABS                        12.1   12.32 )-----------( 253      ( 22 Jul 2022 06:47 )             37.3   07-22    138  |  102  |  41<H>  ----------------------------<  121<H>  5.8<H>   |  25  |  2.31<H>    Ca    9.3      22 Jul 2022 00:35  Phos  4.7     07-22  Mg     1.70     07-22

## 2022-07-22 NOTE — PROGRESS NOTE ADULT - PROBLEM SELECTOR PLAN 1
Pt with hyperkalemia in the setting of CKD. Patient currently asymptomatic and without EKG changes. Pt was initially NPO, unable to give PO lokelma. Pt now on liquid diet. Recommend to start standing lokelma, 10 mg PO q8h. Resume low K diet once able to receive PO. If K levels persistently elevated can give regular insulin with D50; CKD not a contraindication for insulin. No indication for dialysis at this time. Avoid ACEi or ARB for BP management.

## 2022-07-23 LAB
AMYLASE FLD-CCNC: 75 U/L — SIGNIFICANT CHANGE UP
ANION GAP SERPL CALC-SCNC: 10 MMOL/L — SIGNIFICANT CHANGE UP (ref 7–14)
APTT BLD: 24.8 SEC — LOW (ref 27–36.3)
BUN SERPL-MCNC: 36 MG/DL — HIGH (ref 7–23)
CALCIUM SERPL-MCNC: 8.8 MG/DL — SIGNIFICANT CHANGE UP (ref 8.4–10.5)
CHLORIDE SERPL-SCNC: 99 MMOL/L — SIGNIFICANT CHANGE UP (ref 98–107)
CO2 SERPL-SCNC: 29 MMOL/L — SIGNIFICANT CHANGE UP (ref 22–31)
CREAT SERPL-MCNC: 2.23 MG/DL — HIGH (ref 0.5–1.3)
EGFR: 33 ML/MIN/1.73M2 — LOW
GLUCOSE BLDC GLUCOMTR-MCNC: 122 MG/DL — HIGH (ref 70–99)
GLUCOSE SERPL-MCNC: 209 MG/DL — HIGH (ref 70–99)
HCT VFR BLD CALC: 37.5 % — LOW (ref 39–50)
HGB BLD-MCNC: 11.8 G/DL — LOW (ref 13–17)
INR BLD: 1.03 RATIO — SIGNIFICANT CHANGE UP (ref 0.88–1.16)
MAGNESIUM SERPL-MCNC: 2.6 MG/DL — SIGNIFICANT CHANGE UP (ref 1.6–2.6)
MCHC RBC-ENTMCNC: 30.3 PG — SIGNIFICANT CHANGE UP (ref 27–34)
MCHC RBC-ENTMCNC: 31.5 GM/DL — LOW (ref 32–36)
MCV RBC AUTO: 96.4 FL — SIGNIFICANT CHANGE UP (ref 80–100)
NRBC # BLD: 0 /100 WBCS — SIGNIFICANT CHANGE UP
NRBC # FLD: 0 K/UL — SIGNIFICANT CHANGE UP
PHOSPHATE SERPL-MCNC: 3.9 MG/DL — SIGNIFICANT CHANGE UP (ref 2.5–4.5)
PLATELET # BLD AUTO: 258 K/UL — SIGNIFICANT CHANGE UP (ref 150–400)
POTASSIUM SERPL-MCNC: 4.5 MMOL/L — SIGNIFICANT CHANGE UP (ref 3.5–5.3)
POTASSIUM SERPL-SCNC: 4.5 MMOL/L — SIGNIFICANT CHANGE UP (ref 3.5–5.3)
PROTHROM AB SERPL-ACNC: 11.9 SEC — SIGNIFICANT CHANGE UP (ref 10.5–13.4)
RBC # BLD: 3.89 M/UL — LOW (ref 4.2–5.8)
RBC # FLD: 17.6 % — HIGH (ref 10.3–14.5)
SODIUM SERPL-SCNC: 138 MMOL/L — SIGNIFICANT CHANGE UP (ref 135–145)
WBC # BLD: 8.62 K/UL — SIGNIFICANT CHANGE UP (ref 3.8–10.5)
WBC # FLD AUTO: 8.62 K/UL — SIGNIFICANT CHANGE UP (ref 3.8–10.5)

## 2022-07-23 RX ORDER — MAGNESIUM SULFATE 500 MG/ML
4 VIAL (ML) INJECTION ONCE
Refills: 0 | Status: COMPLETED | OUTPATIENT
Start: 2022-07-23 | End: 2022-07-23

## 2022-07-23 RX ADMIN — Medication 40 MILLIGRAM(S): at 05:45

## 2022-07-23 RX ADMIN — Medication 25 GRAM(S): at 05:45

## 2022-07-23 RX ADMIN — OXYCODONE HYDROCHLORIDE 15 MILLIGRAM(S): 5 TABLET ORAL at 03:23

## 2022-07-23 RX ADMIN — OXYCODONE HYDROCHLORIDE 15 MILLIGRAM(S): 5 TABLET ORAL at 06:32

## 2022-07-23 RX ADMIN — Medication 40 MILLIGRAM(S): at 15:11

## 2022-07-23 RX ADMIN — OXYCODONE HYDROCHLORIDE 15 MILLIGRAM(S): 5 TABLET ORAL at 23:43

## 2022-07-23 RX ADMIN — OXYCODONE HYDROCHLORIDE 15 MILLIGRAM(S): 5 TABLET ORAL at 16:18

## 2022-07-23 RX ADMIN — Medication 200 MILLILITER(S): at 20:01

## 2022-07-23 RX ADMIN — OXYCODONE HYDROCHLORIDE 15 MILLIGRAM(S): 5 TABLET ORAL at 07:02

## 2022-07-23 RX ADMIN — HEPARIN SODIUM 5000 UNIT(S): 5000 INJECTION INTRAVENOUS; SUBCUTANEOUS at 05:45

## 2022-07-23 RX ADMIN — OXYCODONE HYDROCHLORIDE 15 MILLIGRAM(S): 5 TABLET ORAL at 00:00

## 2022-07-23 RX ADMIN — OXYCODONE HYDROCHLORIDE 15 MILLIGRAM(S): 5 TABLET ORAL at 09:49

## 2022-07-23 RX ADMIN — OXYCODONE HYDROCHLORIDE 15 MILLIGRAM(S): 5 TABLET ORAL at 10:19

## 2022-07-23 RX ADMIN — PANTOPRAZOLE SODIUM 40 MILLIGRAM(S): 20 TABLET, DELAYED RELEASE ORAL at 12:46

## 2022-07-23 RX ADMIN — OXYCODONE HYDROCHLORIDE 15 MILLIGRAM(S): 5 TABLET ORAL at 20:00

## 2022-07-23 RX ADMIN — Medication 200 MILLILITER(S): at 19:01

## 2022-07-23 RX ADMIN — OXYCODONE HYDROCHLORIDE 15 MILLIGRAM(S): 5 TABLET ORAL at 16:48

## 2022-07-23 RX ADMIN — Medication 650 MILLIGRAM(S): at 00:04

## 2022-07-23 RX ADMIN — OXYCODONE HYDROCHLORIDE 15 MILLIGRAM(S): 5 TABLET ORAL at 13:10

## 2022-07-23 RX ADMIN — HEPARIN SODIUM 5000 UNIT(S): 5000 INJECTION INTRAVENOUS; SUBCUTANEOUS at 18:53

## 2022-07-23 RX ADMIN — OXYCODONE HYDROCHLORIDE 15 MILLIGRAM(S): 5 TABLET ORAL at 12:40

## 2022-07-23 NOTE — PROGRESS NOTE ADULT - SUBJECTIVE AND OBJECTIVE BOX
Anesthesia Pain Management Service: Post Op Day 3__ of Epidural    SUBJECTIVE: Patient states the PCEA helps him but the Oxycodone helps him the most. Patient with staff at bedside, patient pulled his NG tube out as per staff. Patient denies headache, numbness and tingling.  Pain Scale Score:   Refer to charted pain scores    THERAPY:  [x ] Epidural Bupivacaine 0.0625% and Hydromorphone  		[ X] 10 micrograms/mL	[ ] 5 micrograms/mL  [ ] Epidural Bupivacaine 0.0625% and Fentanyl - 2 micrograms/mL  [ ] Epidural Ropivacaine 0.1% plain – 1 mg/mL  [ ] Patient Controlled Regional Anesthesia (PCRA) Ropivacaine  		[ ] 0.2%			[ ] 0.1%    Demand dose __3_ lockout __15_ (minutes) Continuous Rate _8__ Total: _131.2___ ml used (in past 24 hours)      MEDICATIONS  (STANDING):  acetaminophen     Tablet .. 650 milliGRAM(s) Oral every 6 hours  famotidine Injectable 40 milliGRAM(s) IV Push once  furosemide   Injectable 40 milliGRAM(s) IV Push two times a day  heparin   Injectable 5000 Unit(s) SubCutaneous every 12 hours  pantoprazole  Injectable 40 milliGRAM(s) IV Push daily  ropivacaine 0.2% in Sodium Chloride PCEA 200 milliLiter(s) Epidural PCA Continuous    MEDICATIONS  (PRN):  metoprolol tartrate Injectable 5 milliGRAM(s) IV Push every 6 hours PRN SBP > 180  naloxone Injectable 0.1 milliGRAM(s) IV Push every 3 minutes PRN For ANY of the following changes in patient status:  A. RR LESS THAN 10 breaths per minute, B. Oxygen saturation LESS THAN 90%, C. Sedation score of 6  ondansetron Injectable 4 milliGRAM(s) IV Push every 6 hours PRN Nausea  oxyCODONE    IR 15 milliGRAM(s) Oral every 3 hours PRN Severe Pain (7 - 10)  oxyCODONE    IR 10 milliGRAM(s) Oral every 3 hours PRN Moderate Pain (4 - 6)  ropivacaine 0.2% in Sodium Chloride PCEA Rescue Clinician Bolus 5 milliLiter(s) Epidural every 15 minutes PRN for Pain Score greater than 6      OBJECTIVE: Patient sitting up in bed.    Assessment of Catheter Site:	[ ] Left	[ ] Right  [x ] Epidural 	[ ] Femoral	      [ ] Saphenous   [ ] Supraclavicular   [ ] Other:    [x ] Dressing intact	[x ] Site non-tender	[ x] Site without erythema, discharge, edema  [x ] Epidural tubing and connection checked	[x] Gross neurological exam within normal limits  [ ] Catheter removed – tip intact		[ ] Afebrile  	[ ] Febrile: ___   [ X] see Temp under VS below)    PT/INR - ( 23 Jul 2022 07:19 )   PT: 11.9 sec;   INR: 1.03 ratio         PTT - ( 23 Jul 2022 07:19 )  PTT:24.8 sec                      11.8   8.62  )-----------( 258      ( 23 Jul 2022 07:19 )             37.5     Vital Signs Last 24 Hrs  T(C): 36.3 (07-23-22 @ 11:52), Max: 37.2 (07-22-22 @ 16:30)  T(F): 97.3 (07-23-22 @ 11:52), Max: 99 (07-22-22 @ 16:30)  HR: 97 (07-23-22 @ 11:52) (93 - 105)  BP: 124/68 (07-23-22 @ 11:52) (124/68 - 177/93)  BP(mean): --  RR: 18 (07-23-22 @ 11:52) (18 - 18)  SpO2: 100% (07-23-22 @ 11:52) (98% - 100%)      Sedation Score:	[x ] Alert	[ ] Drowsy	[ ] Arousable	[ ] Asleep	[ ] Unresponsive    Side Effects:	[x ] None	[ ] Nausea	[ ] Vomiting	[ ] Pruritus  		[ ] Weakness		[ ] Numbness	[ ] Other:    ASSESSMENT/ PLAN:    Therapy to  be:	[x ] Continue   [ ] Discontinued   [ ] Change to prn Analgesics    Documentation and Verification of current medications:  [ X ] Done	[ ] Not done, not eligible, reason:    Comments: Doing OK with epidural and may continue.    Progress Note written now but Patient was seen earlier.

## 2022-07-23 NOTE — PROGRESS NOTE ADULT - SUBJECTIVE AND OBJECTIVE BOX
Covering for Dr Laney Burks MD  Interventional Cardiology / Endovascular Specialist  [place] Office : [phone] [place] [address] [place]. [address]  Tel:   [place] Office : [phone] [facility] [place] [address]  Tel: [phone]    Pt lying in bed in NAD  denies CP, SOB . C/o surgical site pain on PCA pump   	  MEDICATIONS:  furosemide   Injectable 40 milliGRAM(s) IV Push two times a day  heparin   Injectable 5000 Unit(s) SubCutaneous every 12 hours  metoprolol tartrate Injectable 5 milliGRAM(s) IV Push every 6 hours PRN        acetaminophen     Tablet .. 650 milliGRAM(s) Oral every 6 hours  ondansetron Injectable 4 milliGRAM(s) IV Push every 6 hours PRN  oxyCODONE    IR 10 milliGRAM(s) Oral every 3 hours PRN  oxyCODONE    IR 15 milliGRAM(s) Oral every 3 hours PRN    famotidine Injectable 40 milliGRAM(s) IV Push once  pantoprazole  Injectable 40 milliGRAM(s) IV Push daily          PAST MEDICAL/SURGICAL HISTORY  PAST MEDICAL & SURGICAL HISTORY:  ETOH abuse  sober x1 year approximately      Pancreatitis      Hepatitis C  treated      Gout      HTN (hypertension)      Chronic kidney disease (CKD)      H/O chronic pancreatitis      Gout      Alcohol abuse      Gastric perforation          SOCIAL HISTORY: Substance Use (street drugs): ( x ) never used  (  ) other:    FAMILY HISTORY:  FH: HTN (hypertension)        REVIEW OF SYSTEMS:  CONSTITUTIONAL: No fever, weight loss, or fatigue  EYES: No eye pain, visual disturbances, or discharge  ENMT:  No difficulty hearing, tinnitus, vertigo; No sinus or throat pain  BREASTS: No pain, masses, or nipple discharge  GASTROINTESTINAL: No abdominal or epigastric pain. No nausea, vomiting, or hematemesis; No diarrhea or constipation. No melena or hematochezia.  GENITOURINARY: No dysuria, frequency, hematuria, or incontinence  NEUROLOGICAL: No headaches, memory loss, loss of strength, numbness, or tremors  ENDOCRINE: No heat or cold intolerance; No hair loss  MUSCULOSKELETAL: No joint pain or swelling; No muscle, back, or extremity pain  PSYCHIATRIC: No depression, anxiety, mood swings, or difficulty sleeping  HEME/LYMPH: No easy bruising, or bleeding gums  All others negative    PHYSICAL EXAM:  T(C): 36.5 (07-23-22 @ 15:03), Max: 37.2 (07-22-22 @ 16:30)  HR: 89 (07-23-22 @ 15:03) (89 - 99)  BP: 150/72 (07-23-22 @ 15:03) (124/68 - 177/93)  RR: 18 (07-23-22 @ 15:03) (18 - 18)  SpO2: 99% (07-23-22 @ 15:03) (98% - 100%)  Wt(kg): --  I&O's Summary    22 Jul 2022 07:01  -  23 Jul 2022 07:00  --------------------------------------------------------  IN: 0 mL / OUT: 1460 mL / NET: -1460 mL    23 Jul 2022 07:01  -  23 Jul 2022 15:44  --------------------------------------------------------  IN: 300 mL / OUT: 700 mL / NET: -400 mL        Appearance: In no distress	  HEENT:    PERRL, EOMI	  Cardiovascular:  S1 S2, No JVD  Respiratory: Lungs clear to auscultation	  Gastrointestinal:  Soft, Non-tender, + BS	  Vasculature:  No edema of LE  Psychiatric: Appropriate affect   Neuro: no acute focal deficits                                 11.8   8.62  )-----------( 258      ( 23 Jul 2022 07:19 )             37.5     07-23    138  |  99  |  36<H>  ----------------------------<  209<H>  4.5   |  29  |  2.23<H>    Ca    8.8      23 Jul 2022 10:10  Phos  3.9     07-23  Mg     2.60     07-23      proBNP:   Lipid Profile:   HgA1c:   TSH:     Consultant(s) Notes Reviewed:  [x ] YES  [ ] NO    Care Discussed with Consultants/Other Providers [ x] YES  [ ] NO    Imaging Personally Reviewed independently:  [x] YES  [ ] NO    All labs, radiologic studies, vitals, orders and medications list reviewed. Patient is seen and examined at bedside. Case discussed with medical team.

## 2022-07-23 NOTE — PHYSICAL THERAPY INITIAL EVALUATION ADULT - ADDITIONAL COMMENTS
Pt lives in a private house with mother, +4 steps to enter and 1 flight to ascend to bedroom, uses a rolling walker for all mobility and is independent with ADLs.    Pt left semi-supine in bed, all lines intact, call bell in reach, comfortable and in NAD.

## 2022-07-23 NOTE — PROGRESS NOTE ADULT - ASSESSMENT
61M with PMHx of chronic pancreatitis (complicated by chronic pain of which secondary to alcohol abuse), CKD4, hep C s/p SVR and benign esophageal stricture (post-stent 4/19/22 c/b need for CRE balloon dilation, reposition and suture on 4/29) presents with epigastric pain, most likely due to chronic pancreatic duct obstruction. Surgery on board.       1) Preop Risk stratification  -s/p  [place] procedure.  tolerated the procedure well     2) HTN :  - better controlled   - c/w nifedipine     3) DVT PPX heparin

## 2022-07-23 NOTE — PROGRESS NOTE ADULT - SUBJECTIVE AND OBJECTIVE BOX
TEAM Surgery Progress Note  Patient is a 61y old  Male who presents with a chief complaint of abdominal pain (22 Jul 2022 14:27)      INTERVAL EVENTS: Patient is POD# ***No acute events overnight.  SUBJECTIVE: Patient seen and examined at bedside with surgical team, patient without complaints. Denies fever, chills, CP, SOB nausea, vomiting, abdominal pain.    REVIEW OF SYSTEMS:  Constitutional: No fevers or chills. No malaise or weakness.  EENT: No vision changes. No ear pain. No nasal congestion or rhinitis. No throat pain or dysphagia.  Respiratory: No cough, wheezing, or SOB. No hemoptysis.  Cardiovascular: No chest pain or palpitations.  Gastrointestinal: No abdominal pain. No nausea, vomiting, diarrhea or constipation. No hematochezia. No melena.  Genitourinary: No dysuria, hematuria, or frequency.  Neurologic: No numbness or tingling. No weakness.  Skin: No rashes or pruritus.     OBJECTIVE:    Vital Signs Last 24 Hrs  T(C): 36.9 (23 Jul 2022 00:08), Max: 37.2 (22 Jul 2022 16:30)  T(F): 98.4 (23 Jul 2022 00:08), Max: 99 (22 Jul 2022 16:30)  HR: 94 (23 Jul 2022 00:08) (88 - 105)  BP: 161/93 (23 Jul 2022 00:08) (148/88 - 186/95)  BP(mean): --  RR: 18 (23 Jul 2022 00:08) (18 - 18)  SpO2: 100% (23 Jul 2022 00:08) (98% - 100%)    Parameters below as of 23 Jul 2022 00:08  Patient On (Oxygen Delivery Method): room air    I&O's Detail    21 Jul 2022 07:01  -  22 Jul 2022 07:00  --------------------------------------------------------  IN:  Total IN: 0 mL    OUT:    Bulb (mL): 15 mL    Indwelling Catheter - Urethral (mL): 2375 mL    Nasogastric/Oral tube (mL): 200 mL  Total OUT: 2590 mL    Total NET: -2590 mL      22 Jul 2022 07:01  -  23 Jul 2022 02:39  --------------------------------------------------------  IN:  Total IN: 0 mL    OUT:    Bulb (mL): 10 mL    Voided (mL): 700 mL  Total OUT: 710 mL    Total NET: -710 mL      MEDICATIONS  (STANDING):  acetaminophen     Tablet .. 650 milliGRAM(s) Oral every 6 hours  famotidine Injectable 40 milliGRAM(s) IV Push once  furosemide   Injectable 40 milliGRAM(s) IV Push two times a day  heparin   Injectable 5000 Unit(s) SubCutaneous every 12 hours  hydrALAZINE Injectable 10 milliGRAM(s) IV Push once  magnesium sulfate  IVPB 4 Gram(s) IV Intermittent once  pantoprazole  Injectable 40 milliGRAM(s) IV Push daily  ropivacaine 0.2% in Sodium Chloride PCEA 200 milliLiter(s) Epidural PCA Continuous    MEDICATIONS  (PRN):  metoprolol tartrate Injectable 5 milliGRAM(s) IV Push every 6 hours PRN SBP > 180  naloxone Injectable 0.1 milliGRAM(s) IV Push every 3 minutes PRN For ANY of the following changes in patient status:  A. RR LESS THAN 10 breaths per minute, B. Oxygen saturation LESS THAN 90%, C. Sedation score of 6  ondansetron Injectable 4 milliGRAM(s) IV Push every 6 hours PRN Nausea  oxyCODONE    IR 10 milliGRAM(s) Oral every 3 hours PRN Moderate Pain (4 - 6)  oxyCODONE    IR 15 milliGRAM(s) Oral every 3 hours PRN Severe Pain (7 - 10)  ropivacaine 0.2% in Sodium Chloride PCEA Rescue Clinician Bolus 5 milliLiter(s) Epidural every 15 minutes PRN for Pain Score greater than 6      PHYSICAL EXAM:  Constitutional: A&Ox3, NAD  Respiratory: Unlabored breathing  Abdomen: Soft, nondistended, NTTP. No rebound or guarding.  Extremities: WWP, GAR spontaneously    LABS:                        12.1   12.32 )-----------( 253      ( 22 Jul 2022 06:47 )             37.3     07-22    138  |  99  |  36<H>  ----------------------------<  136<H>  5.0   |  36<H>  |  2.21<H>    Ca    8.7      22 Jul 2022 23:00  Phos  4.3     07-22  Mg     1.50     07-22      PT/INR - ( 22 Jul 2022 06:47 )   PT: 11.7 sec;   INR: 1.01 ratio         PTT - ( 22 Jul 2022 06:47 )  PTT:26.4 sec          IMAGING:     TEAM Surgery Progress Note  Patient is a 61y old  Male who presents with a chief complaint of abdominal pain (22 Jul 2022 14:27)    SUBJECTIVE: Patient seen and examined at bedside with surgical team, patient without complaints. Patient had laryngeal spasms overnight which resolved without intervention. Patient is tolerating FLD without N/V. Patients bowel function is +/+. Denies fever, chills, CP, SOB.    OBJECTIVE:  ICU Vital Signs Last 24 Hrs  T(C): 37 (23 Jul 2022 09:40), Max: 37.2 (22 Jul 2022 16:30)  T(F): 98.6 (23 Jul 2022 09:40), Max: 99 (22 Jul 2022 16:30)  HR: 99 (23 Jul 2022 09:40) (93 - 105)  BP: 135/68 (23 Jul 2022 09:40) (135/68 - 177/93)  BP(mean): --  ABP: --  ABP(mean): --  RR: 18 (23 Jul 2022 09:40) (18 - 18)  SpO2: 100% (23 Jul 2022 09:40) (98% - 100%)    O2 Parameters below as of 23 Jul 2022 09:40  Patient On (Oxygen Delivery Method): room air    I&O's Detail  I&O's Detail    22 Jul 2022 07:01  -  23 Jul 2022 07:00  --------------------------------------------------------  IN:  Total IN: 0 mL    OUT:    Bulb (mL): 10 mL    Voided (mL): 1450 mL  Total OUT: 1460 mL    Total NET: -1460 mL      23 Jul 2022 07:01  -  23 Jul 2022 09:52  --------------------------------------------------------  IN:  Total IN: 0 mL    OUT:    Bulb (mL): 0 mL    Voided (mL): 500 mL  Total OUT: 500 mL    Total NET: -500 mL    MEDICATIONS  (STANDING):  acetaminophen     Tablet .. 650 milliGRAM(s) Oral every 6 hours  famotidine Injectable 40 milliGRAM(s) IV Push once  furosemide   Injectable 40 milliGRAM(s) IV Push two times a day  heparin   Injectable 5000 Unit(s) SubCutaneous every 12 hours  hydrALAZINE Injectable 10 milliGRAM(s) IV Push once  magnesium sulfate  IVPB 4 Gram(s) IV Intermittent once  pantoprazole  Injectable 40 milliGRAM(s) IV Push daily  ropivacaine 0.2% in Sodium Chloride PCEA 200 milliLiter(s) Epidural PCA Continuous    MEDICATIONS  (PRN):  metoprolol tartrate Injectable 5 milliGRAM(s) IV Push every 6 hours PRN SBP > 180  naloxone Injectable 0.1 milliGRAM(s) IV Push every 3 minutes PRN For ANY of the following changes in patient status:  A. RR LESS THAN 10 breaths per minute, B. Oxygen saturation LESS THAN 90%, C. Sedation score of 6  ondansetron Injectable 4 milliGRAM(s) IV Push every 6 hours PRN Nausea  oxyCODONE    IR 10 milliGRAM(s) Oral every 3 hours PRN Moderate Pain (4 - 6)  oxyCODONE    IR 15 milliGRAM(s) Oral every 3 hours PRN Severe Pain (7 - 10)  ropivacaine 0.2% in Sodium Chloride PCEA Rescue Clinician Bolus 5 milliLiter(s) Epidural every 15 minutes PRN for Pain Score greater than 6      PHYSICAL EXAM:  Constitutional: A&Ox3, NAD  Respiratory: Unlabored breathing  Abdomen: Soft, nondistended, NTTP with midline prevena in place. No rebound or guarding.  Extremities: WWP, GAR spontaneously    LABS:                                 11.8   8.62  )-----------( 258      ( 23 Jul 2022 07:19 )             37.5     07-22    138  |  99  |  36<H>  ----------------------------<  136<H>  5.0   |  36<H>  |  2.21<H>    Ca    8.7      22 Jul 2022 23:00  Phos  4.3     07-22  Mg     1.50     07-22

## 2022-07-23 NOTE — PROGRESS NOTE ADULT - ASSESSMENT
61M with PMHx of ETOH abuse, chronic pancreatitis with chronic PD dilation, PD stone and calcifications, perforated gastric ulcer s/p exlap with David patch repair (2019, Dr. Murray), CKD4, hep C s/p SVR and benign esophageal stricture (post-stent 4/19/22 c/b need for CRE balloon dilation, reposition and suture on 4/29) presents with a flare of chronic pancreatitis. Patient is s/p puestow procedure on 7/20    Plan/Recommendations:  - Medicine consult for hypertension  - Diet: clear  - Pain control with PCA and PCEA  - DC chen and f/u TOV  - FU nephrology recommendations for hyperkalemia and CKD  - BMP q12   - Monitor vital signs  - DVT prophylaxis 61M with PMHx of ETOH abuse, chronic pancreatitis with chronic PD dilation, PD stone and calcifications, perforated gastric ulcer s/p exlap with David patch repair (2019, Dr. Murray), CKD4, hep C s/p SVR and benign esophageal stricture (post-stent 4/19/22 c/b need for CRE balloon dilation, reposition and suture on 4/29) presents with a flare of chronic pancreatitis. Patient is s/p pu[place]ow procedure on 7/20    Plan/Recommendations:  - Medicine consult for hypertension, recommend starting 30mg nifedpine  - Diet: FLD, will consider advancing  - Pain control with PCA and PRN PO oxycodone   - Kenney has been d/paulina and patient passed TOV  - FU nephrology recommendations for hyperkalemia and CKD  - BMP q12, f/u  - Monitor vital signs  - DVT prophylaxis

## 2022-07-23 NOTE — PHYSICAL THERAPY INITIAL EVALUATION ADULT - GENERAL OBSERVATIONS, REHAB EVAL
Pt received semi-supine in bed, +CINDY drain, +PCA pump, +IV, comfortable and in NAD. Pt agreeable to participate in PT evaluation.

## 2022-07-24 LAB
ANION GAP SERPL CALC-SCNC: 8 MMOL/L — SIGNIFICANT CHANGE UP (ref 7–14)
APTT BLD: 27.1 SEC — SIGNIFICANT CHANGE UP (ref 27–36.3)
BUN SERPL-MCNC: 38 MG/DL — HIGH (ref 7–23)
CALCIUM SERPL-MCNC: 8.5 MG/DL — SIGNIFICANT CHANGE UP (ref 8.4–10.5)
CHLORIDE SERPL-SCNC: 98 MMOL/L — SIGNIFICANT CHANGE UP (ref 98–107)
CO2 SERPL-SCNC: 27 MMOL/L — SIGNIFICANT CHANGE UP (ref 22–31)
CREAT SERPL-MCNC: 2.21 MG/DL — HIGH (ref 0.5–1.3)
EGFR: 33 ML/MIN/1.73M2 — LOW
GLUCOSE SERPL-MCNC: 94 MG/DL — SIGNIFICANT CHANGE UP (ref 70–99)
HCT VFR BLD CALC: 33.6 % — LOW (ref 39–50)
HGB BLD-MCNC: 10.5 G/DL — LOW (ref 13–17)
INR BLD: 0.93 RATIO — SIGNIFICANT CHANGE UP (ref 0.88–1.16)
MAGNESIUM SERPL-MCNC: 2.2 MG/DL — SIGNIFICANT CHANGE UP (ref 1.6–2.6)
MCHC RBC-ENTMCNC: 30.2 PG — SIGNIFICANT CHANGE UP (ref 27–34)
MCHC RBC-ENTMCNC: 31.3 GM/DL — LOW (ref 32–36)
MCV RBC AUTO: 96.6 FL — SIGNIFICANT CHANGE UP (ref 80–100)
NRBC # BLD: 0 /100 WBCS — SIGNIFICANT CHANGE UP
NRBC # FLD: 0 K/UL — SIGNIFICANT CHANGE UP
PHOSPHATE SERPL-MCNC: 3.7 MG/DL — SIGNIFICANT CHANGE UP (ref 2.5–4.5)
PLATELET # BLD AUTO: 263 K/UL — SIGNIFICANT CHANGE UP (ref 150–400)
POTASSIUM SERPL-MCNC: 4.7 MMOL/L — SIGNIFICANT CHANGE UP (ref 3.5–5.3)
POTASSIUM SERPL-SCNC: 4.7 MMOL/L — SIGNIFICANT CHANGE UP (ref 3.5–5.3)
PROTHROM AB SERPL-ACNC: 10.8 SEC — SIGNIFICANT CHANGE UP (ref 10.5–13.4)
RBC # BLD: 3.48 M/UL — LOW (ref 4.2–5.8)
RBC # FLD: 17.1 % — HIGH (ref 10.3–14.5)
SODIUM SERPL-SCNC: 133 MMOL/L — LOW (ref 135–145)
WBC # BLD: 9.47 K/UL — SIGNIFICANT CHANGE UP (ref 3.8–10.5)
WBC # FLD AUTO: 9.47 K/UL — SIGNIFICANT CHANGE UP (ref 3.8–10.5)

## 2022-07-24 RX ORDER — ROPIVACAINE HCL/PF 5 MG/ML
200 AMPUL (ML) INJECTION
Refills: 0 | Status: DISCONTINUED | OUTPATIENT
Start: 2022-07-24 | End: 2022-07-24

## 2022-07-24 RX ORDER — ACETAMINOPHEN 500 MG
650 TABLET ORAL EVERY 6 HOURS
Refills: 0 | Status: DISCONTINUED | OUTPATIENT
Start: 2022-07-24 | End: 2022-07-25

## 2022-07-24 RX ADMIN — Medication 200 MILLILITER(S): at 11:53

## 2022-07-24 RX ADMIN — OXYCODONE HYDROCHLORIDE 15 MILLIGRAM(S): 5 TABLET ORAL at 21:30

## 2022-07-24 RX ADMIN — Medication 200 MILLILITER(S): at 07:46

## 2022-07-24 RX ADMIN — PANTOPRAZOLE SODIUM 40 MILLIGRAM(S): 20 TABLET, DELAYED RELEASE ORAL at 10:43

## 2022-07-24 RX ADMIN — OXYCODONE HYDROCHLORIDE 15 MILLIGRAM(S): 5 TABLET ORAL at 14:09

## 2022-07-24 RX ADMIN — OXYCODONE HYDROCHLORIDE 15 MILLIGRAM(S): 5 TABLET ORAL at 10:41

## 2022-07-24 RX ADMIN — Medication 40 MILLIGRAM(S): at 14:58

## 2022-07-24 RX ADMIN — OXYCODONE HYDROCHLORIDE 15 MILLIGRAM(S): 5 TABLET ORAL at 17:14

## 2022-07-24 RX ADMIN — OXYCODONE HYDROCHLORIDE 15 MILLIGRAM(S): 5 TABLET ORAL at 14:40

## 2022-07-24 RX ADMIN — OXYCODONE HYDROCHLORIDE 15 MILLIGRAM(S): 5 TABLET ORAL at 23:42

## 2022-07-24 RX ADMIN — HEPARIN SODIUM 5000 UNIT(S): 5000 INJECTION INTRAVENOUS; SUBCUTANEOUS at 07:14

## 2022-07-24 RX ADMIN — OXYCODONE HYDROCHLORIDE 15 MILLIGRAM(S): 5 TABLET ORAL at 11:35

## 2022-07-24 RX ADMIN — OXYCODONE HYDROCHLORIDE 15 MILLIGRAM(S): 5 TABLET ORAL at 17:40

## 2022-07-24 RX ADMIN — OXYCODONE HYDROCHLORIDE 15 MILLIGRAM(S): 5 TABLET ORAL at 07:14

## 2022-07-24 RX ADMIN — Medication 40 MILLIGRAM(S): at 07:14

## 2022-07-24 RX ADMIN — OXYCODONE HYDROCHLORIDE 15 MILLIGRAM(S): 5 TABLET ORAL at 03:37

## 2022-07-24 RX ADMIN — OXYCODONE HYDROCHLORIDE 15 MILLIGRAM(S): 5 TABLET ORAL at 20:31

## 2022-07-24 NOTE — PROGRESS NOTE ADULT - SUBJECTIVE AND OBJECTIVE BOX
Anesthesia Pain Management Service: Day 5__ of Epidural    SUBJECTIVE: Patient is still complaining of pain, despite getting Oxycodone 15mg every 3 hours PRN.    Pain Scale Score: 10/10   Refer to charted pain scores    THERAPY:  [ ] Epidural Bupivacaine 0.0625% and Hydromorphone  		[ X] 10 micrograms/mL	[ ] 5 micrograms/mL  [ ] Epidural Bupivacaine 0.0625% and Fentanyl - 2 micrograms/mL  [x ] Epidural Ropivacaine 0.1% plain – 1 mg/mL  [ ] Patient Controlled Regional Anesthesia (PCRA) Ropivacaine  		[ ] 0.2%			[ ] 0.1%    Demand dose __0_ lockout __0_ (minutes) Continuous Rate __8_ Total: __124.10_ ml used (in past 24 hours)      MEDICATIONS  (STANDING):  famotidine Injectable 40 milliGRAM(s) IV Push once  furosemide   Injectable 40 milliGRAM(s) IV Push two times a day  heparin   Injectable 5000 Unit(s) SubCutaneous every 12 hours  pantoprazole  Injectable 40 milliGRAM(s) IV Push daily  ropivacaine 0.2% in Sodium Chloride PCEA 200 milliLiter(s) Epidural PCA Continuous    MEDICATIONS  (PRN):  metoprolol tartrate Injectable 5 milliGRAM(s) IV Push every 6 hours PRN SBP > 180  naloxone Injectable 0.1 milliGRAM(s) IV Push every 3 minutes PRN For ANY of the following changes in patient status:  A. RR LESS THAN 10 breaths per minute, B. Oxygen saturation LESS THAN 90%, C. Sedation score of 6  ondansetron Injectable 4 milliGRAM(s) IV Push every 6 hours PRN Nausea  oxyCODONE    IR 10 milliGRAM(s) Oral every 3 hours PRN Moderate Pain (4 - 6)  oxyCODONE    IR 15 milliGRAM(s) Oral every 3 hours PRN Severe Pain (7 - 10)  ropivacaine 0.2% in Sodium Chloride PCEA Rescue Clinician Bolus 5 milliLiter(s) Epidural every 15 minutes PRN for Pain Score greater than 6      OBJECTIVE:  Patient is sitting up in bed.      Assessment of Catheter Site:	[ ] Left	[ ] Right  [x ] Epidural 	[ ] Femoral	      [ ] Saphenous   [ ] Supraclavicular   [ ] Other:    [x ] Dressing intact	[x ] Site non-tender	[ x] Site without erythema, discharge, edema  [x ] Epidural tubing and connection checked	[x] Gross neurological exam within normal limits  [ ] Catheter removed – tip intact		[ ] Afebrile  	[ ] Febrile: ___   [ X] see Temp under VS below)    PT/INR - ( 24 Jul 2022 07:21 )   PT: 10.8 sec;   INR: 0.93 ratio         PTT - ( 24 Jul 2022 07:21 )  PTT:27.1 sec                      10.5   9.47  )-----------( 263      ( 24 Jul 2022 07:21 )             33.6     Vital Signs Last 24 Hrs  T(C): 37.1 (07-24-22 @ 12:16), Max: 37.1 (07-24-22 @ 12:16)  T(F): 98.8 (07-24-22 @ 12:16), Max: 98.8 (07-24-22 @ 12:16)  HR: 91 (07-24-22 @ 12:16) (80 - 95)  BP: 171/100 (07-24-22 @ 12:16) (150/72 - 171/100)  BP(mean): --  RR: 18 (07-24-22 @ 12:16) (16 - 18)  SpO2: 100% (07-24-22 @ 12:16) (99% - 100%)      Sedation Score:	[x ] Alert	[ ] Drowsy	[ ] Arousable	[ ] Asleep	[ ] Unresponsive    Side Effects:	[x ] None	[ ] Nausea	[ ] Vomiting	[ ] Pruritus  		[ ] Weakness		[ ] Numbness	[ ] Other:    ASSESSMENT/ PLAN:    Therapy to  be:	[x ] Continue   [ ] Discontinued   [ ] Change to prn Analgesics    Documentation and Verification of current medications:  [ X ] Done	[ ] Not done, not eligible, reason:    Comments: Patient's epidural working from levels T9-T12.  Discussed patient with D team and recommend that epidural be removed to reduce risk of infection since it is Day 5 of the epidural.  As per team, they would like to continue with the epidural today, and to reassess tomorrow.  Increased continuous rate to 10ml/hr and added po Tylenol.  Recommend non-opioid adjuvant analgesics to be used when possible and when allowed by primary surgical team.    Progress Note written now but Patient was seen earlier.

## 2022-07-24 NOTE — PROGRESS NOTE ADULT - ASSESSMENT
ASSESSMENT  61M with PMHx of ETOH abuse, chronic pancreatitis with chronic PD dilation, PD stone and calcifications, perforated gastric ulcer s/p exlap with David patch repair (2019, Dr. Murray), CKD4, hep C s/p SVR and benign esophageal stricture (post-stent 4/19/22 c/b need for CRE balloon dilation, reposition and suture on 4/29) presents with a flare of chronic pancreatitis. Patient is s/p puestow procedure on 7/20. Patient hyperkalemia is well controlled.     PLAN  - PCEA discontinued today   - Provena removal tomorrow 07.25.2022   - Diet: Soft diet  - Pain control with PO medications  - FU nephrology recommendations for hyperkalemia and CKD  - Monitor vital signs  - DVT prophylaxis

## 2022-07-24 NOTE — PROGRESS NOTE ADULT - SUBJECTIVE AND OBJECTIVE BOX
Marco Antonio Burks MD  Interventional Cardiology / Endovascular Specialist  [place] Office : [phone] [place] [address] [place] [address]  Tel:   [place] Office : [address] [facility] [place] [address]  Tel: [phone]    Pt lying in bed in NAD  denies CP, SOB . C/o surgical site pain on PCA pump   	  MEDICATIONS:  furosemide   Injectable 40 milliGRAM(s) IV Push two times a day  heparin   Injectable 5000 Unit(s) SubCutaneous every 12 hours  metoprolol tartrate Injectable 5 milliGRAM(s) IV Push every 6 hours PRN        acetaminophen     Tablet .. 650 milliGRAM(s) Oral every 6 hours  ondansetron Injectable 4 milliGRAM(s) IV Push every 6 hours PRN  oxyCODONE    IR 10 milliGRAM(s) Oral every 3 hours PRN  oxyCODONE    IR 15 milliGRAM(s) Oral every 3 hours PRN    famotidine Injectable 40 milliGRAM(s) IV Push once  pantoprazole  Injectable 40 milliGRAM(s) IV Push daily          PAST MEDICAL/SURGICAL HISTORY  PAST MEDICAL & SURGICAL HISTORY:  ETOH abuse  sober x1 year approximately      Pancreatitis      Hepatitis C  treated      Gout      HTN (hypertension)      Chronic kidney disease (CKD)      H/O chronic pancreatitis      Gout      Alcohol abuse      Gastric perforation          SOCIAL HISTORY: Substance Use (street drugs): ( x ) never used  (  ) other:    FAMILY HISTORY:  FH: HTN (hypertension)        REVIEW OF SYSTEMS:  CONSTITUTIONAL: No fever, weight loss, or fatigue  EYES: No eye pain, visual disturbances, or discharge  ENMT:  No difficulty hearing, tinnitus, vertigo; No sinus or throat pain  BREASTS: No pain, masses, or nipple discharge  GASTROINTESTINAL: No abdominal or epigastric pain. No nausea, vomiting, or hematemesis; No diarrhea or constipation. No melena or hematochezia.  GENITOURINARY: No dysuria, frequency, hematuria, or incontinence  NEUROLOGICAL: No headaches, memory loss, loss of strength, numbness, or tremors  ENDOCRINE: No heat or cold intolerance; No hair loss  MUSCULOSKELETAL: No joint pain or swelling; No muscle, back, or extremity pain  PSYCHIATRIC: No depression, anxiety, mood swings, or difficulty sleeping  HEME/LYMPH: No easy bruising, or bleeding gums  All others negative    PHYSICAL EXAM:  T(C): 37.1 (07-24-22 @ 12:16), Max: 37.1 (07-24-22 @ 12:16)  HR: 91 (07-24-22 @ 12:16) (80 - 95)  BP: 171/100 (07-24-22 @ 12:16) (150/72 - 171/100)  RR: 18 (07-24-22 @ 12:16) (16 - 18)  SpO2: 100% (07-24-22 @ 12:16) (99% - 100%)  Wt(kg): --  I&O's Summary    23 Jul 2022 07:01  -  24 Jul 2022 07:00  --------------------------------------------------------  IN: 300 mL / OUT: 1300 mL / NET: -1000 mL    24 Jul 2022 07:01  -  24 Jul 2022 13:37  --------------------------------------------------------  IN: 0 mL / OUT: 300 mL / NET: -300 mL        Appearance: In no distress	  HEENT:    PERRL, EOMI	  Cardiovascular:  S1 S2, No JVD  Respiratory: Lungs clear to auscultation	  Gastrointestinal:  Soft, Non-tender, + BS	  Vasculature:  No edema of LE  Psychiatric: Appropriate affect   Neuro: no acute focal deficits                             10.5   9.47  )-----------( 263      ( 24 Jul 2022 07:21 )             33.6     07-24    133<L>  |  98  |  38<H>  ----------------------------<  94  4.7   |  27  |  2.21<H>    Ca    8.5      24 Jul 2022 07:21  Phos  3.7     07-24  Mg     2.20     07-24      proBNP:   Lipid Profile:   HgA1c:   TSH:     Consultant(s) Notes Reviewed:  [x ] YES  [ ] NO    Care Discussed with Consultants/Other Providers [ x] YES  [ ] NO    Imaging Personally Reviewed independently:  [x] YES  [ ] NO    All labs, radiologic studies, vitals, orders and medications list reviewed. Patient is seen and examined at bedside. Case discussed with medical team.

## 2022-07-24 NOTE — PROGRESS NOTE ADULT - SUBJECTIVE AND OBJECTIVE BOX
· Subjective and Objective:   Anesthesia Pain Management Service: Day 5__ of Epidural    SUBJECTIVE: Patient is still complaining of pain, despite getting Oxycodone 15mg every 3 hours PRN.    Pain Scale Score: 10/10   Refer to charted pain scores    THERAPY:  [ ] Epidural Bupivacaine 0.0625% and Hydromorphone  		[ X] 10 micrograms/mL	[ ] 5 micrograms/mL  [ ] Epidural Bupivacaine 0.0625% and Fentanyl - 2 micrograms/mL  [x ] Epidural Ropivacaine 0.1% plain – 1 mg/mL  [ ] Patient Controlled Regional Anesthesia (PCRA) Ropivacaine  		[ ] 0.2%			[ ] 0.1%    Demand dose __0_ lockout __0_ (minutes) Continuous Rate __8_ Total: __124.10_ ml used (in past 24 hours)      MEDICATIONS  (STANDING):  famotidine Injectable 40 milliGRAM(s) IV Push once  furosemide   Injectable 40 milliGRAM(s) IV Push two times a day  heparin   Injectable 5000 Unit(s) SubCutaneous every 12 hours  pantoprazole  Injectable 40 milliGRAM(s) IV Push daily  ropivacaine 0.2% in Sodium Chloride PCEA 200 milliLiter(s) Epidural PCA Continuous    MEDICATIONS  (PRN):  metoprolol tartrate Injectable 5 milliGRAM(s) IV Push every 6 hours PRN SBP > 180  naloxone Injectable 0.1 milliGRAM(s) IV Push every 3 minutes PRN For ANY of the following changes in patient status:  A. RR LESS THAN 10 breaths per minute, B. Oxygen saturation LESS THAN 90%, C. Sedation score of 6  ondansetron Injectable 4 milliGRAM(s) IV Push every 6 hours PRN Nausea  oxyCODONE    IR 10 milliGRAM(s) Oral every 3 hours PRN Moderate Pain (4 - 6)  oxyCODONE    IR 15 milliGRAM(s) Oral every 3 hours PRN Severe Pain (7 - 10)  ropivacaine 0.2% in Sodium Chloride PCEA Rescue Clinician Bolus 5 milliLiter(s) Epidural every 15 minutes PRN for Pain Score greater than 6      OBJECTIVE:  Patient is sitting up in bed.      Assessment of Catheter Site:	[ ] Left	[ ] Right  [x ] Epidural 	[ ] Femoral	      [ ] Saphenous   [ ] Supraclavicular   [ ] Other:    [x ] Dressing intact	[x ] Site non-tender	[ x] Site without erythema, discharge, edema  [x ] Epidural tubing and connection checked	[x] Gross neurological exam within normal limits  [ ] Catheter removed – tip intact		[ ] Afebrile  	[ ] Febrile: ___   [ X] see Temp under VS below)    PT/INR - ( 24 Jul 2022 07:21 )   PT: 10.8 sec;   INR: 0.93 ratio         PTT - ( 24 Jul 2022 07:21 )  PTT:27.1 sec                      10.5   9.47  )-----------( 263      ( 24 Jul 2022 07:21 )             33.6     Vital Signs Last 24 Hrs  T(C): 37.1 (07-24-22 @ 12:16), Max: 37.1 (07-24-22 @ 12:16)  T(F): 98.8 (07-24-22 @ 12:16), Max: 98.8 (07-24-22 @ 12:16)  HR: 91 (07-24-22 @ 12:16) (80 - 95)  BP: 171/100 (07-24-22 @ 12:16) (150/72 - 171/100)  BP(mean): --  RR: 18 (07-24-22 @ 12:16) (16 - 18)  SpO2: 100% (07-24-22 @ 12:16) (99% - 100%)      Sedation Score:	[x ] Alert	[ ] Drowsy	[ ] Arousable	[ ] Asleep	[ ] Unresponsive    Side Effects:	[x ] None	[ ] Nausea	[ ] Vomiting	[ ] Pruritus  		[ ] Weakness		[ ] Numbness	[ ] Other:    ASSESSMENT/ PLAN:    Therapy to  be:	[x ] Continue   [ ] Discontinued   [ ] Change to prn Analgesics    Documentation and Verification of current medications:  [ X ] Done	[ ] Not done, not eligible, reason:    Comments: Patient's epidural working from levels T9-T12.  Discussed patient with D team and recommend that epidural be removed to reduce risk of infection since it is Day 5 of the epidural.  As per team, they would like to continue with the epidural today, and to reassess tomorrow.  Increased continuous rate to 10ml/hr and added po Tylenol.  Recommend non-opioid adjuvant analgesics to be used when possible and when allowed by primary surgical team.    Progress Note written now but Patient was seen earlier.

## 2022-07-24 NOTE — PROGRESS NOTE ADULT - SUBJECTIVE AND OBJECTIVE BOX
TEAM [ D ] Surgery Daily Progress Note  =====================================================    SUBJECTIVE: No acute events overnight. Patient seen and examined at bedside on AM rounds. Patient reports that they're feeling well. Tolerating diet, denies nausea, vomiting. Patient endorses passing Flatus and BM. OOB/Amublating as tolerated. Denies fever, chills.    ALLERGIES:  No Known Allergies  --------------------------------------------------------------------------------------  MEDICATIONS:    Neurologic Medications  acetaminophen     Tablet .. 650 milliGRAM(s) Oral every 6 hours  ondansetron Injectable 4 milliGRAM(s) IV Push every 6 hours PRN Nausea  oxyCODONE    IR 10 milliGRAM(s) Oral every 3 hours PRN Moderate Pain (4 - 6)  oxyCODONE    IR 15 milliGRAM(s) Oral every 3 hours PRN Severe Pain (7 - 10)    Cardiovascular Medications  furosemide   Injectable 40 milliGRAM(s) IV Push two times a day  metoprolol tartrate Injectable 5 milliGRAM(s) IV Push every 6 hours PRN SBP > 180    Gastrointestinal Medications  famotidine Injectable 40 milliGRAM(s) IV Push once  pantoprazole  Injectable 40 milliGRAM(s) IV Push daily    Hematologic/Oncologic Medications  heparin   Injectable 5000 Unit(s) SubCutaneous every 12 hours    Topical/Other Medications  naloxone Injectable 0.1 milliGRAM(s) IV Push every 3 minutes PRN For ANY of the following changes in patient status:  A. RR LESS THAN 10 breaths per minute, B. Oxygen saturation LESS THAN 90%, C. Sedation score of 6  ropivacaine 0.2% in Sodium Chloride PCEA Rescue Clinician Bolus 5 milliLiter(s) Epidural every 15 minutes PRN for Pain Score greater than 6    --------------------------------------------------------------------------------------    VITAL SIGNS:    Vital Signs Last 24 Hrs  T(C): 37.1 (24 Jul 2022 12:16), Max: 37.1 (24 Jul 2022 12:16)  T(F): 98.8 (24 Jul 2022 12:16), Max: 98.8 (24 Jul 2022 12:16)  HR: 91 (24 Jul 2022 12:16) (80 - 95)  BP: 171/100 (24 Jul 2022 12:16) (150/72 - 171/100)  BP(mean): --  RR: 18 (24 Jul 2022 12:16) (16 - 18)  SpO2: 100% (24 Jul 2022 12:16) (99% - 100%)    Parameters below as of 24 Jul 2022 12:16  Patient On (Oxygen Delivery Method): room air      --------------------------------------------------------------------------------------    EXAM    General: NAD, resting in bed comfortably.  Cardiac: regular rate, warm and well perfused  Respiratory: Nonlabored respirations, normal cw expansion.  Abdomen: soft, nontender, nondistended.   Extremities: normal strength, FROM, no deformities    --------------------------------------------------------------------------------------    LABS                          10.5   9.47  )-----------( 263      ( 24 Jul 2022 07:21 )             33.6   BMI: BMI (kg/m2): 20.9 (07-20-22 @ 10:13)  HbA1c: A1C with Estimated Average Glucose Result: 5.5 % (04-29-22 @ 05:36)    Glucose: POCT Blood Glucose.: 122 mg/dL (07-23-22 @ 08:49)    BP: 171/100 (07-24-22 @ 12:16) (124/68 - 193/98)  Lipid Panel: Date/Time: 02-09-22 @ 17:49  Cholesterol, Serum: 159  Direct LDL: --  HDL Cholesterol, Serum: 49  Total Cholesterol/HDL Ration Measurement: --  Triglycerides, Serum: 126      --------------------------------------------------------------------------------------

## 2022-07-24 NOTE — PROGRESS NOTE ADULT - SUBJECTIVE AND OBJECTIVE BOX
Anesthesia Pain Management Service: Day _5_ of Epidural    SUBJECTIVE: Patient was sitting up in chair sleeping, but easily awakened. Pain is better controlled than earlier this morning.  Pain Scale Score:  (X) Refer to charted pain scores    THERAPY:  [ ] Epidural Bupivacaine 0.0625% and Hydromorphone  		[X ] 10 micrograms/mL	[ ] 5 micrograms/mL  [ ] Epidural Bupivacaine 0.0625% and Fentanyl - 2 micrograms/mL  [x ] Epidural Ropivacaine 0.1% plain – 1 mg/mL  [ ] Patient Controlled Regional Anesthesia (PCRA) Ropivacaine  		[ ] 0.2%			[ ] 0.1%    Demand dose ___0  lockout __0_ (minutes) Continuous Rate __10_ Total: _27.1 reservoir_      MEDICATIONS  (STANDING):  acetaminophen     Tablet .. 650 milliGRAM(s) Oral every 6 hours  famotidine Injectable 40 milliGRAM(s) IV Push once  furosemide   Injectable 40 milliGRAM(s) IV Push two times a day  pantoprazole  Injectable 40 milliGRAM(s) IV Push daily    MEDICATIONS  (PRN):  metoprolol tartrate Injectable 5 milliGRAM(s) IV Push every 6 hours PRN SBP > 180  naloxone Injectable 0.1 milliGRAM(s) IV Push every 3 minutes PRN For ANY of the following changes in patient status:  A. RR LESS THAN 10 breaths per minute, B. Oxygen saturation LESS THAN 90%, C. Sedation score of 6  ondansetron Injectable 4 milliGRAM(s) IV Push every 6 hours PRN Nausea  oxyCODONE    IR 10 milliGRAM(s) Oral every 3 hours PRN Moderate Pain (4 - 6)  oxyCODONE    IR 15 milliGRAM(s) Oral every 3 hours PRN Severe Pain (7 - 10)      OBJECTIVE:  Patient is sitting up in chair.    Assessment of Catheter Site:	[ ] Left	[ ] Right  [x ] Epidural 	[ ] Femoral	      [ ] Saphenous   [ ] Supraclavicular   [ ] Other:    [x ] Dressing intact	[x ] Site non-tender	[ x] Site without erythema, discharge, edema  [x ] Epidural tubing and connection checked	[x] Gross neurological exam within normal limits  [X ] Catheter removed – tip intact		[ ] Afebrile	  [ ] Febrile: ___       [ X] see Temp under VS below)    PT/INR - ( 24 Jul 2022 07:21 )   PT: 10.8 sec;   INR: 0.93 ratio         PTT - ( 24 Jul 2022 07:21 )  PTT:27.1 sec                      10.5   9.47  )-----------( 263      ( 24 Jul 2022 07:21 )             33.6     Vital Signs Last 24 Hrs  T(C): 37.1 (07-24-22 @ 12:16), Max: 37.1 (07-24-22 @ 12:16)  T(F): 98.8 (07-24-22 @ 12:16), Max: 98.8 (07-24-22 @ 12:16)  HR: 91 (07-24-22 @ 12:16) (80 - 95)  BP: 171/100 (07-24-22 @ 12:16) (153/88 - 171/100)  BP(mean): --  RR: 18 (07-24-22 @ 12:16) (16 - 18)  SpO2: 100% (07-24-22 @ 12:16) (100% - 100%)      Sedation Score:	[x ] Alert	[ ] Drowsy	[ ] Arousable	[ ] Asleep	[ ] Unresponsive    Side Effects:	[x ] None	[ ] Nausea	[ ] Vomiting	[ ] Pruritus  		[ ] Weakness		[ ] Numbness	[ ] Other:    ASSESSMENT/ PLAN:    Therapy to  be:	[ ] Continue   [ X] Discontinued   [ X] Change to prn Analgesics    Documentation and Verification of current medications:  [ X ] Done	[ ] Not done, not eligible, reason:    Comments: D team requesting epidural be removed. PCEA discontinued and changed to oral opioid and/or non-opioid Adjuvant analgesics to be used at this point.    Progress Note written now but Patient was seen earlier.

## 2022-07-24 NOTE — PROGRESS NOTE ADULT - ASSESSMENT
61M with PMHx of chronic pancreatitis (complicated by chronic pain of which secondary to alcohol abuse), CKD4, hep C s/p SVR and benign esophageal stricture (post-stent 4/19/22 c/b need for CRE balloon dilation, reposition and suture on 4/29) presents with epigastric pain, most likely due to chronic pancreatic duct obstruction. Surgery on board.       1) Preop Risk stratification  -s/p  Puestow procedure.  tolerated the procedure well     2) HTN :  - c/w metoprolol can switch to IV labetalol if BP stays high     3) DVT PPX heparin

## 2022-07-25 ENCOUNTER — TRANSCRIPTION ENCOUNTER (OUTPATIENT)
Age: 62
End: 2022-07-25

## 2022-07-25 VITALS
TEMPERATURE: 98 F | RESPIRATION RATE: 19 BRPM | HEART RATE: 91 BPM | OXYGEN SATURATION: 100 % | SYSTOLIC BLOOD PRESSURE: 147 MMHG | DIASTOLIC BLOOD PRESSURE: 80 MMHG

## 2022-07-25 LAB
ANION GAP SERPL CALC-SCNC: 11 MMOL/L — SIGNIFICANT CHANGE UP (ref 7–14)
BUN SERPL-MCNC: 36 MG/DL — HIGH (ref 7–23)
CALCIUM SERPL-MCNC: 8.8 MG/DL — SIGNIFICANT CHANGE UP (ref 8.4–10.5)
CHLORIDE SERPL-SCNC: 97 MMOL/L — LOW (ref 98–107)
CO2 SERPL-SCNC: 28 MMOL/L — SIGNIFICANT CHANGE UP (ref 22–31)
CREAT SERPL-MCNC: 2.22 MG/DL — HIGH (ref 0.5–1.3)
EGFR: 33 ML/MIN/1.73M2 — LOW
GLUCOSE SERPL-MCNC: 93 MG/DL — SIGNIFICANT CHANGE UP (ref 70–99)
HCT VFR BLD CALC: 35.4 % — LOW (ref 39–50)
HGB BLD-MCNC: 10.9 G/DL — LOW (ref 13–17)
MAGNESIUM SERPL-MCNC: 1.9 MG/DL — SIGNIFICANT CHANGE UP (ref 1.6–2.6)
MCHC RBC-ENTMCNC: 30.1 PG — SIGNIFICANT CHANGE UP (ref 27–34)
MCHC RBC-ENTMCNC: 30.8 GM/DL — LOW (ref 32–36)
MCV RBC AUTO: 97.8 FL — SIGNIFICANT CHANGE UP (ref 80–100)
NRBC # BLD: 0 /100 WBCS — SIGNIFICANT CHANGE UP
NRBC # FLD: 0 K/UL — SIGNIFICANT CHANGE UP
PHOSPHATE SERPL-MCNC: 3.7 MG/DL — SIGNIFICANT CHANGE UP (ref 2.5–4.5)
PLATELET # BLD AUTO: 296 K/UL — SIGNIFICANT CHANGE UP (ref 150–400)
POTASSIUM SERPL-MCNC: 4.7 MMOL/L — SIGNIFICANT CHANGE UP (ref 3.5–5.3)
POTASSIUM SERPL-SCNC: 4.7 MMOL/L — SIGNIFICANT CHANGE UP (ref 3.5–5.3)
RBC # BLD: 3.62 M/UL — LOW (ref 4.2–5.8)
RBC # FLD: 16.2 % — HIGH (ref 10.3–14.5)
SODIUM SERPL-SCNC: 136 MMOL/L — SIGNIFICANT CHANGE UP (ref 135–145)
WBC # BLD: 7.93 K/UL — SIGNIFICANT CHANGE UP (ref 3.8–10.5)
WBC # FLD AUTO: 7.93 K/UL — SIGNIFICANT CHANGE UP (ref 3.8–10.5)

## 2022-07-25 PROCEDURE — 99232 SBSQ HOSP IP/OBS MODERATE 35: CPT | Mod: GC

## 2022-07-25 RX ORDER — PANTOPRAZOLE SODIUM 20 MG/1
40 TABLET, DELAYED RELEASE ORAL
Refills: 0 | Status: DISCONTINUED | OUTPATIENT
Start: 2022-07-25 | End: 2022-07-25

## 2022-07-25 RX ORDER — NALOXONE HYDROCHLORIDE 4 MG/.1ML
4 SPRAY NASAL
Qty: 8 | Refills: 0
Start: 2022-07-25 | End: 2022-07-26

## 2022-07-25 RX ORDER — LIPASE/PROTEASE/AMYLASE 16-48-48K
2 CAPSULE,DELAYED RELEASE (ENTERIC COATED) ORAL
Qty: 0 | Refills: 0 | DISCHARGE

## 2022-07-25 RX ORDER — OXYCODONE HYDROCHLORIDE 5 MG/1
1 TABLET ORAL
Qty: 16 | Refills: 0
Start: 2022-07-25 | End: 2022-07-26

## 2022-07-25 RX ORDER — ACETAMINOPHEN 500 MG
2 TABLET ORAL
Qty: 0 | Refills: 0 | DISCHARGE
Start: 2022-07-25

## 2022-07-25 RX ORDER — HEPARIN SODIUM 5000 [USP'U]/ML
5000 INJECTION INTRAVENOUS; SUBCUTANEOUS EVERY 8 HOURS
Refills: 0 | Status: DISCONTINUED | OUTPATIENT
Start: 2022-07-25 | End: 2022-07-25

## 2022-07-25 RX ORDER — PANTOPRAZOLE SODIUM 20 MG/1
1 TABLET, DELAYED RELEASE ORAL
Qty: 0 | Refills: 0 | DISCHARGE

## 2022-07-25 RX ORDER — OXYCODONE HYDROCHLORIDE 5 MG/1
1 TABLET ORAL
Qty: 18 | Refills: 0
Start: 2022-07-25 | End: 2022-07-27

## 2022-07-25 RX ORDER — PANTOPRAZOLE SODIUM 20 MG/1
1 TABLET, DELAYED RELEASE ORAL
Qty: 30 | Refills: 0
Start: 2022-07-25 | End: 2022-08-23

## 2022-07-25 RX ORDER — NIFEDIPINE 30 MG
1 TABLET, EXTENDED RELEASE 24 HR ORAL
Qty: 30 | Refills: 0
Start: 2022-07-25 | End: 2022-08-23

## 2022-07-25 RX ORDER — FUROSEMIDE 40 MG
40 TABLET ORAL EVERY 12 HOURS
Refills: 0 | Status: DISCONTINUED | OUTPATIENT
Start: 2022-07-25 | End: 2022-07-25

## 2022-07-25 RX ORDER — NIFEDIPINE 30 MG
30 TABLET, EXTENDED RELEASE 24 HR ORAL DAILY
Refills: 0 | Status: DISCONTINUED | OUTPATIENT
Start: 2022-07-25 | End: 2022-07-25

## 2022-07-25 RX ORDER — FUROSEMIDE 40 MG
1 TABLET ORAL
Qty: 28 | Refills: 0
Start: 2022-07-25 | End: 2022-08-07

## 2022-07-25 RX ADMIN — OXYCODONE HYDROCHLORIDE 15 MILLIGRAM(S): 5 TABLET ORAL at 06:22

## 2022-07-25 RX ADMIN — HEPARIN SODIUM 5000 UNIT(S): 5000 INJECTION INTRAVENOUS; SUBCUTANEOUS at 05:47

## 2022-07-25 RX ADMIN — Medication 30 MILLIGRAM(S): at 12:55

## 2022-07-25 RX ADMIN — OXYCODONE HYDROCHLORIDE 15 MILLIGRAM(S): 5 TABLET ORAL at 12:55

## 2022-07-25 RX ADMIN — OXYCODONE HYDROCHLORIDE 15 MILLIGRAM(S): 5 TABLET ORAL at 09:30

## 2022-07-25 RX ADMIN — OXYCODONE HYDROCHLORIDE 15 MILLIGRAM(S): 5 TABLET ORAL at 05:46

## 2022-07-25 RX ADMIN — OXYCODONE HYDROCHLORIDE 15 MILLIGRAM(S): 5 TABLET ORAL at 16:03

## 2022-07-25 RX ADMIN — Medication 40 MILLIGRAM(S): at 05:47

## 2022-07-25 RX ADMIN — PANTOPRAZOLE SODIUM 40 MILLIGRAM(S): 20 TABLET, DELAYED RELEASE ORAL at 09:00

## 2022-07-25 RX ADMIN — OXYCODONE HYDROCHLORIDE 15 MILLIGRAM(S): 5 TABLET ORAL at 09:00

## 2022-07-25 RX ADMIN — OXYCODONE HYDROCHLORIDE 15 MILLIGRAM(S): 5 TABLET ORAL at 00:30

## 2022-07-25 NOTE — DISCHARGE NOTE NURSING/CASE MANAGEMENT/SOCIAL WORK - NSDCPEFALRISK_GEN_ALL_CORE
For information on Fall & Injury Prevention, visit: [email or web address] OR  [email or web address] OR  https://www.cdc.gov/steadi/patient.html

## 2022-07-25 NOTE — PROGRESS NOTE ADULT - ASSESSMENT
61M with PMHx of chronic pancreatitis (complicated by chronic pain of which secondary to alcohol abuse), CKD4, hep C s/p SVR and benign esophageal stricture (post-stent 4/19/22 c/b need for CRE balloon dilation, reposition and suture on 4/29) presents with epigastric pain, most likely due to chronic pancreatic duct obstruction. Surgery on board.       1) Preop Risk stratification  -s/p  P[place]ow procedure.  tolerated the procedure well     2) HTN :  - now able to take po   - start nifedipine 30 mg po daily     3) CKD switch lasix to 40 po daily  if ok with nephro      DVT PPX heparin

## 2022-07-25 NOTE — PROGRESS NOTE ADULT - REASON FOR ADMISSION
abdominal pain

## 2022-07-25 NOTE — DISCHARGE NOTE NURSING/CASE MANAGEMENT/SOCIAL WORK - NSDCFUADDAPPT_GEN_ALL_CORE_FT
F/u with Gastroenterology, Dr Edwards in 1-2 months for esophageal stent removal. Please call [phone] to schedule an appointment

## 2022-07-25 NOTE — CHART NOTE - NSCHARTNOTEFT_GEN_A_CORE
Patient to be discharged today.  At home Dr. Lamberto Hurtado prescribes chronic pain medication as previously confirmed and documented by the Hospitalist team, confirmed by iSTOP. Per iSTOP record, patient picked up 28 day supply then was admitted to the hospital 2 weeks later. Should have 2 weeks left of pain medication. Current pain regimen controlling the patient's pain that he describes as "chronic", and is consistent with the dosage and frequency of his outpatient regimen.     Others' Prescriptions  Patient Name: Eitan Palma Date: 1960  Address: [address], [place] [address]Ubg: Male  Rx Written	Rx Dispensed	Drug	Quantity	Days Supply	Prescriber Name	Prescriber Ana #	Payment Method  06/17/2022	06/22/2022	oxycodone-acetaminophen  mg tab	168	28	Lamberto Hurtado	[id number]	Medicaid  Dispenser [facility] #[id number]  06/14/2022	06/15/2022	oxycodone-acetaminophen  mg tab	42	7	Annemarie Terrazas PA-C	[id number]	Medicaid  Dispenser [facility] #[id number]  05/18/2022	05/25/2022	oxycodone-acetaminophen  mg tab	112	19	Lamberto Hurtado	[id number]	Medicaid  Dispenser [facility] #[id number]  05/11/2022	05/11/2022	oxycodone-acetaminophen 5-325 mg tab	112	14	Lamberto Hurtado	[id number]	Medicaid  Dispenser [facility] #[id number]  05/10/2022	05/10/2022	oxycodone hcl (ir) 10 mg tab	28	7	[facility]	[id number]	Medicaid  Dispenser [facility]  04/23/2022	04/25/2022	oxycodone hcl (ir) 10 mg tab	42	7	[facility]	[id number]	Insurance  Dispenser [facility]  03/25/2022	04/01/2022	oxycodone-acetaminophen 5-325 mg tab	112	14	Lamberto Hurtado	[id number]	Medicaid  Dispenser [facility] #[id number]  03/09/2022	03/18/2022	oxycodone-acetaminophen 5-325 mg tab	112	14	Lamberto [id number]	Medicaid  Dispenser [facility] #[id number]  02/25/2022	03/05/2022	oxycontin er 15 mg tablet	42	21	Lamberto Hurtado	[id number]	Medicaid  Dispenser [facility] #[id number]  02/09/2022	02/12/2022	oxycodone-acetaminophen 5-325 mg tab	84	21	Lamberto Hurtado	[id number]	Insurance  Dispenser [facility] #[id number]  02/02/2022	02/02/2022	oxycodone-acetaminophen 5-325 mg tablet	40	5	Layne Elizondo	[id number]	Medicaid  Dispenser [facility]  01/08/2022	01/08/2022	oxycodone-acetaminophen 5-325 mg tab	40	10	Katelynn Erazo	[id number]	Insurance  Dispenser [facility] #[id number] Patient to be discharged today.  At home Dr. Lamberto Hurtado prescribes chronic pain medication as previously confirmed and documented by the Hospitalist team, confirmed by iSTOP. Per iSTOP record, patient picked up 28 day supply then was admitted to the hospital 2 weeks later. Should have 2 weeks left of pain medication. Current pain regimen controlling the patient's pain that he describes as "chronic".      Confirmed outpatient appointment with Dr. Lamberto Hurtado at 2pm on 7/27/22,  Will dc home with 2 days of Oxycodone 15mg every 3-4 hours for 2 days. Patient requesting 5mg tablets because "it works better" Explained to the patient that the milligram amount and frequency is what is significant. Patient ultimately agreebable with plan to follow up with Dr. Hurtado.         Others' Prescriptions  Patient Name: Eitan Palma Date: 1960  Address: [address] [place] [id number]: Male  Rx Written	Rx Dispensed	Drug	Quantity	Days Supply	Prescriber Name	Prescriber Ana #	Payment Method  06/17/2022	06/22/2022	oxycodone-acetaminophen  mg tab	168	28	Lamberto Hurtado	[id number]	Medicaid  Dispenser [facility] #[id number]  06/14/2022	06/15/2022	oxycodone-acetaminophen  mg tab	42	7	Annemarie Terrazas PA-C	[id number]	Medicaid  Dispenser [facility] #[id number]  05/18/2022	05/25/2022	oxycodone-acetaminophen  mg tab	112	19	Lamberto Hurtado	[id number]	Medicaid  Dispenser [facility] #[id number]  05/11/2022	05/11/2022	oxycodone-acetaminophen 5-325 mg tab	112	14	Lamberto Hurtado	[id number]	Medicaid  Dispenser [facility] #[id number]  05/10/2022	05/10/2022	oxycodone hcl (ir) 10 mg tab	28	7	[facility]	[id number]	Medicaid  Dispenser [facility]  04/23/2022	04/25/2022	oxycodone hcl (ir) 10 mg tab	42	7	[facility]	[id number]	Insurance  Dispenser [facility]  03/25/2022	04/01/2022	oxycodone-acetaminophen 5-325 mg tab	112	14	Lamberto Hurtado	[id number]	Medicaid  Dispenser [facility] #[id number]  03/09/2022	03/18/2022	oxycodone-acetaminophen 5-325 mg tab	112	14	Lamberto Hurtado	[id number]	Medicaid  Dispenser [facility] #[id number]  02/25/2022	03/05/2022	oxycontin er 15 mg tablet	42	21	Lamberto Hurtado	[id number]	Medicaid  Dispenser [facility] #[id number]  02/09/2022	02/12/2022	oxycodone-acetaminophen 5-325 mg tab	84	21	Lambreto Hurtado	[id number]	Insurance  Dispenser [facility] #[id number]  02/02/2022	02/02/2022	oxycodone-acetaminophen 5-325 mg tablet	40	5	Layne Elizondo	[id number]	Medicaid  Dispenser [facility]  01/08/2022	01/08/2022	oxycodone-acetaminophen 5-325 mg tab	40	10	Katelynn Erazo	[id number]	Insurance  Dispenser [facility] #[id number] Patient to be discharged today.  At home Dr. Lamberto Hurtado prescribes chronic pain medication as previously confirmed and documented by the Hospitalist team, confirmed by iSTOP. Per iSTOP record, patient picked up 28 day supply then was admitted to the hospital 2 weeks later. Should have 2 weeks left of pain medication. Current pain regimen controlling the patient's pain that he describes as "chronic".      Confirmed outpatient appointment with Dr. Lamberto Hurtado at 2pm on 7/27/22,  Will dc home with 2 days of Oxycodone 15mg every 3-4 hours for 2 days. Patient requesting 5mg tablets because "it works better" Explained to the patient that the milligram amount and frequency is what is significant. Patient ultimately agreebable with plan to follow up with Dr. Hurtado.     Discussed with patient that he is at high risk for overdose. He states that he has narcan at home and he and his family have been instructed on how to use it.   Will send Narcan to [facility] pharmacy with his other medications.        Others' Prescriptions  Patient Name: Eitan Palma Date: 1960  Address: [address] [place] [id number]: Male  Rx Written	Rx Dispensed	Drug	Quantity	Days Supply	Prescriber Name	Prescriber Ana #	Payment Method  06/17/2022	06/22/2022	oxycodone-acetaminophen  mg tab	168	28	Lamberto Hurtado	[id number]	Medicaid  Dispenser [facility] #[id number]  06/14/2022	06/15/2022	oxycodone-acetaminophen  mg tab	42	7	Annemarie Terrazas PA-C	[id number]	Medicaid  Dispenser [facility] #[id number]  05/18/2022	05/25/2022	oxycodone-acetaminophen  mg tab	112	19	Lamberto Hurtado	[id number]	Medicaid  Dispenser [facility] #[id number]  05/11/2022	05/11/2022	oxycodone-acetaminophen 5-325 mg tab	112	14	Lamberto Hurtado	[id number]	Medicaid  Dispenser [facility] #[id number]  05/10/2022	05/10/2022	oxycodone hcl (ir) 10 mg tab	28	7	[facility]	[id number]	Medicaid  Dispenser [facility]  04/23/2022	04/25/2022	oxycodone hcl (ir) 10 mg tab	42	7	[facility]	[id number]	Insurance  Dispenser [facility]  03/25/2022	04/01/2022	oxycodone-acetaminophen 5-325 mg tab	112	14	Lamberto Hurtado	[id number]	Medicaid  Dispenser [facility] #[id number]  03/09/2022	03/18/2022	oxycodone-acetaminophen 5-325 mg tab	112	14	Lamberto Hurtado	[id number]	Medicaid  Dispenser [facility] #[id number]  02/25/2022	03/05/2022	oxycontin er 15 mg tablet	42	21	Lamberto Hurtado	[id number]	Medicaid  Dispenser [facility] #[id number]  02/09/2022	02/12/2022	oxycodone-acetaminophen 5-325 mg tab	84	21	Lamberto Hurtado	[id number]	Insurance  Dispenser [facility] #[id number]  02/02/2022	02/02/2022	oxycodone-acetaminophen 5-325 mg tablet	40	5	Layne Elizondo	[id number]	Medicaid  Dispenser [facility]  01/08/2022	01/08/2022	oxycodone-acetaminophen 5-325 mg tab	40	10	Katelynn Erazo	[id number]	Insurance  Dispenser [facility] #[id number]

## 2022-07-25 NOTE — DISCHARGE NOTE NURSING/CASE MANAGEMENT/SOCIAL WORK - PATIENT PORTAL LINK FT
You can access the FollowMyHealth Patient Portal offered by [facility] by registering at the following website: [email or web address]. By joining [facility]’s FollowMyHealth portal, you will also be able to view your health information using other applications (apps) compatible with our system.

## 2022-07-25 NOTE — PROGRESS NOTE ADULT - PROBLEM SELECTOR PLAN 1
Pt with hyperkalemia in the setting of CKD. Hyperkalemia now resolved s/p lokelma, Insulin/D50, and lasix. Serum potassium is 4.7 today. Pt currently on lasix 40 mg IV BID. Recommend to continue with lasix, can switch to lasix 40 mg PO BID if pt is to be discharged. Recommend low K diet. If K levels persistently elevated can give regular insulin with D50. No indication for dialysis at this time. Avoid ACEi or ARB for BP management.

## 2022-07-25 NOTE — CHART NOTE - NSCHARTNOTESELECT_GEN_ALL_CORE
Surgery
Transfer
Vascular Surgery/Event Note
cardiology/Event Note
Event Note
PreOp Note
Surgery
Surgery
Surgical Oncology
iSTOP
pain/Event Note

## 2022-07-25 NOTE — PROGRESS NOTE ADULT - PROVIDER SPECIALTY LIST ADULT
Cardiology
Gastroenterology
Pain Medicine
Surgery
Surgery
Cardiology
Hospitalist
Nephrology
Pain Medicine
Pain Medicine
Surgery
Cardiology
Gastroenterology
Pain Medicine
SICU
Surgery
Cardiology
Surgery
Hospitalist
Surgery
Surgery
Nephrology
Hospitalist
Hospitalist
Nephrology
Hospitalist

## 2022-07-25 NOTE — PROGRESS NOTE ADULT - SUBJECTIVE AND OBJECTIVE BOX
[facility] Division of Kidney Diseases & Hypertension  FOLLOW UP NOTE  [phone]--------------------------------------------------------------------------------    HPI:  60 yo Male with PMHx of chronic pancreatitis (complicated by chronic pain of which secondary to alcohol abuse), CKD4, hep C s/p SVR and benign esophageal stricture (post-stent 4/19/22 c/b need for CRE balloon dilation, reposition and suture on 4/29) presents with epigastric pain. Now s/p [place] procedure. Pt being seen for persistent hyperkalemia.    Pt seen and examined bedside this morning. Endorses body aches. Is tolerating diet. Denies any headaches, nausea, vomiting, fevers/chills, chest pain, palpitations, SOB, abdominal pain, and leg swelling.       PAST HISTORY  --------------------------------------------------------------------------------  No significant changes to PMH, PSH, FHx, SHx, unless otherwise noted    ALLERGIES & MEDICATIONS  --------------------------------------------------------------------------------  Allergies    No Known Allergies    Intolerances      Standing Inpatient Medications  acetaminophen     Tablet .. 650 milliGRAM(s) Oral every 6 hours  furosemide    Tablet 40 milliGRAM(s) Oral every 12 hours  heparin   Injectable 5000 Unit(s) SubCutaneous every 8 hours  NIFEdipine XL 30 milliGRAM(s) Oral daily  pantoprazole    Tablet 40 milliGRAM(s) Oral before breakfast    PRN Inpatient Medications  naloxone Injectable 0.1 milliGRAM(s) IV Push every 3 minutes PRN  oxyCODONE    IR 10 milliGRAM(s) Oral every 3 hours PRN  oxyCODONE    IR 15 milliGRAM(s) Oral every 3 hours PRN      REVIEW OF SYSTEMS  --------------------------------------------------------------------------------  Gen: No  fevers/chills  Skin: No rashes  Head/Eyes/Ears/Mouth: No headache  Respiratory: No dyspnea  CV: Positive for chest wall pain  GI: No abdominal pain  MSK: Positive for body aches. No edema  Neuro: No dizziness/lightheadedness    All other systems were reviewed and are negative, except as noted.    VITALS/PHYSICAL EXAM  --------------------------------------------------------------------------------  T(C): 36.9 (07-25-22 @ 12:41), Max: 37.1 (07-25-22 @ 00:08)  HR: 91 (07-25-22 @ 12:41) (88 - 93)  BP: 147/80 (07-25-22 @ 12:41) (147/79 - 170/97)  RR: 19 (07-25-22 @ 12:41) (18 - 19)  SpO2: 100% (07-25-22 @ 12:41) (99% - 100%)  Wt(kg): --        07-24-22 @ 07:01  -  07-25-22 @ 07:00  --------------------------------------------------------  IN: 630 mL / OUT: 1450 mL / NET: -820 mL      Physical Exam:  Gen: Resting  Pulm: CTA B/L  CV: RRR, S1S2;  Abd: +BS, soft, nontender/nondistended  : No suprapubic tenderness  Extremities: no bilateral LE edema noted  Neuro: No focal deficits, intact gait  Skin: Warm, without rashes  Vascular access: Peripheral IV    LABS/STUDIES  --------------------------------------------------------------------------------              10.9   7.93  >-----------<  296      [07-25-22 @ 06:00]              35.4     136  |  97  |  36  ----------------------------<  93      [07-25-22 @ 06:00]  4.7   |  28  |  2.22        Ca     8.8     [07-25-22 @ 06:00]      Mg     1.90     [07-25-22 @ 06:00]      Phos  3.7     [07-25-22 @ 06:00]      PT/INR: PT 10.8 , INR 0.93       [07-24-22 @ 07:21]  PTT: 27.1       [07-24-22 @ 07:21]      Creatinine Trend:  SCr 2.22 [07-25 @ 06:00]  SCr 2.21 [07-24 @ 07:21]  SCr 2.23 [07-23 @ 10:10]  SCr 2.21 [07-22 @ 23:00]  SCr 2.28 [07-22 @ 06:47]    Urinalysis - [07-20-22 @ 08:00]      Color Light Yellow / Appearance Clear / SG 1.015 / pH 6.5      Gluc Negative / Ketone Negative  / Bili Negative / Urobili <2 mg/dL       Blood Negative / Protein Negative / Leuk Est Negative / Nitrite Negative      RBC  / WBC  / Hyaline  / Gran  / Sq Epi  / Non Sq Epi  / Bacteria     Urine Creatinine 52      [07-20-22 @ 08:00]  Urine Protein 8      [07-20-22 @ 08:00]

## 2022-07-25 NOTE — PROGRESS NOTE ADULT - NUTRITIONAL ASSESSMENT
This patient has been assessed with a concern for Malnutrition and has been determined to have a diagnosis/diagnoses of Severe protein-calorie malnutrition.    This patient is being managed with:   Diet Regular-  Supplement Feeding Modality:  Oral  Ensure Enlive Cans or Servings Per Day:  1       Frequency:  Three Times a day  Entered: Jul 16 2022  8:45AM    
This patient has been assessed with a concern for Malnutrition and has been determined to have a diagnosis/diagnoses of Severe protein-calorie malnutrition.    This patient is being managed with:   Diet NPO after Midnight-     NPO Start Date: 19-Jul-2022   NPO Start Time: 23:59  Except Medications  Entered: Jul 19 2022  3:23PM    Diet Regular-  For patients receiving Renal Replacement - No Protein Restr No Conc K No Conc Phos Low Sodium (RENAL)  Supplement Feeding Modality:  Oral  Nepro Cans or Servings Per Day:  1       Frequency:  Three Times a day  Entered: Jul 18 2022  7:15AM    
This patient has been assessed with a concern for Malnutrition and has been determined to have a diagnosis/diagnoses of Severe protein-calorie malnutrition.    This patient is being managed with:   Diet Regular-  Supplement Feeding Modality:  Oral  Ensure Enlive Cans or Servings Per Day:  1       Frequency:  Three Times a day  Entered: Jul 16 2022  8:45AM    
This patient has been assessed with a concern for Malnutrition and has been determined to have a diagnosis/diagnoses of Severe protein-calorie malnutrition.    This patient is being managed with:   Diet Regular-  For patients receiving Renal Replacement - No Protein Restr No Conc K No Conc Phos Low Sodium (RENAL)  Supplement Feeding Modality:  Oral  Nepro Cans or Servings Per Day:  1       Frequency:  Three Times a day  Entered: Jul 18 2022  7:15AM    
This patient has been assessed with a concern for Malnutrition and has been determined to have a diagnosis/diagnoses of Severe protein-calorie malnutrition.    This patient is being managed with:   Diet Regular-  Supplement Feeding Modality:  Oral  Ensure Enlive Cans or Servings Per Day:  1       Frequency:  Three Times a day  Entered: Jul 16 2022  8:45AM    
This patient has been assessed with a concern for Malnutrition and has been determined to have a diagnosis/diagnoses of Severe protein-calorie malnutrition.    This patient is being managed with:   Diet Full Liquid-  For patients receiving Renal Replacement - No Protein Restr No Conc K No Conc Phos Low Sodium (RENAL)  Entered: Jul 22 2022  4:27PM    
This patient has been assessed with a concern for Malnutrition and has been determined to have a diagnosis/diagnoses of Severe protein-calorie malnutrition.    This patient is being managed with:   Diet NPO-  Entered: Jul 20 2022  7:24PM    
This patient has been assessed with a concern for Malnutrition and has been determined to have a diagnosis/diagnoses of Severe protein-calorie malnutrition.    This patient is being managed with:   Diet Soft and Bite Sized-  For patients receiving Renal Replacement - No Protein Restr No Conc K No Conc Phos Low Sodium (RENAL)  Entered: Jul 23 2022 12:00PM    
This patient has been assessed with a concern for Malnutrition and has been determined to have a diagnosis/diagnoses of Severe protein-calorie malnutrition.    This patient is being managed with:   Diet Soft and Bite Sized-  For patients receiving Renal Replacement - No Protein Restr No Conc K No Conc Phos Low Sodium (RENAL)  Entered: Jul 23 2022 12:00PM

## 2022-07-25 NOTE — PROGRESS NOTE ADULT - PROBLEM SELECTOR PLAN 2
Stage 4 CKD, baseline creatinine around 2.5-3.0. Currently appears to be at baseline. Patient does not currently have a nephrologist. Unclear etiology of CKD at this time. Documented urine output is 1.4L over the past 24 hours. Continue to monitor labs and urine output. Avoid nephrotoxic medications and dose medications as per eGFR.    If you have any questions, please feel free to contact me  Treva Mohr  Nephrology Fellow  [phone] / [employer] Teams(Preferred)  (After 5pm or on weekends please page the on-call fellow)

## 2022-07-25 NOTE — PROGRESS NOTE ADULT - SUBJECTIVE AND OBJECTIVE BOX
TEAM Surgery Progress Note  Patient is a 61y old  Male who presents with a chief complaint of abdominal pain (24 Jul 2022 16:00)      INTERVAL EVENTS: Patient is POD# ***No acute events overnight.  SUBJECTIVE: Patient seen and examined at bedside with surgical team, patient without complaints. Denies fever, chills, CP, SOB nausea, vomiting, abdominal pain.    REVIEW OF SYSTEMS:  Constitutional: No fevers or chills. No malaise or weakness.  EENT: No vision changes. No ear pain. No nasal congestion or rhinitis. No throat pain or dysphagia.  Respiratory: No cough, wheezing, or SOB. No hemoptysis.  Cardiovascular: No chest pain or palpitations.  Gastrointestinal: No abdominal pain. No nausea, vomiting, diarrhea or constipation. No hematochezia. No melena.  Genitourinary: No dysuria, hematuria, or frequency.  Neurologic: No numbness or tingling. No weakness.  Skin: No rashes or pruritus.     OBJECTIVE:    Vital Signs Last 24 Hrs  T(C): 37.1 (25 Jul 2022 00:08), Max: 37.1 (24 Jul 2022 12:16)  T(F): 98.8 (25 Jul 2022 00:08), Max: 98.8 (24 Jul 2022 12:16)  HR: 93 (25 Jul 2022 00:08) (80 - 93)  BP: 152/73 (25 Jul 2022 00:08) (152/73 - 171/100)  BP(mean): --  RR: 18 (25 Jul 2022 00:08) (16 - 18)  SpO2: 99% (25 Jul 2022 00:08) (99% - 100%)    Parameters below as of 25 Jul 2022 00:08  Patient On (Oxygen Delivery Method): room air    I&O's Detail    23 Jul 2022 07:01  -  24 Jul 2022 07:00  --------------------------------------------------------  IN:    Oral Fluid: 300 mL  Total IN: 300 mL    OUT:    Bulb (mL): 0 mL    Voided (mL): 1300 mL  Total OUT: 1300 mL    Total NET: -1000 mL      24 Jul 2022 07:01  -  25 Jul 2022 04:12  --------------------------------------------------------  IN:    Oral Fluid: 450 mL  Total IN: 450 mL    OUT:    Voided (mL): 1150 mL  Total OUT: 1150 mL    Total NET: -700 mL      MEDICATIONS  (STANDING):  acetaminophen     Tablet .. 650 milliGRAM(s) Oral every 6 hours  famotidine Injectable 40 milliGRAM(s) IV Push once  furosemide   Injectable 40 milliGRAM(s) IV Push two times a day  pantoprazole  Injectable 40 milliGRAM(s) IV Push daily    MEDICATIONS  (PRN):  metoprolol tartrate Injectable 5 milliGRAM(s) IV Push every 6 hours PRN SBP > 180  naloxone Injectable 0.1 milliGRAM(s) IV Push every 3 minutes PRN For ANY of the following changes in patient status:  A. RR LESS THAN 10 breaths per minute, B. Oxygen saturation LESS THAN 90%, C. Sedation score of 6  ondansetron Injectable 4 milliGRAM(s) IV Push every 6 hours PRN Nausea  oxyCODONE    IR 10 milliGRAM(s) Oral every 3 hours PRN Moderate Pain (4 - 6)  oxyCODONE    IR 15 milliGRAM(s) Oral every 3 hours PRN Severe Pain (7 - 10)      PHYSICAL EXAM:  Constitutional: A&Ox3, NAD  Respiratory: Unlabored breathing  Abdomen: Soft, nondistended, NTTP. No rebound or guarding.  Extremities: WWP, GAR spontaneously    LABS:                        10.5   9.47  )-----------( 263      ( 24 Jul 2022 07:21 )             33.6     07-24    133<L>  |  98  |  38<H>  ----------------------------<  94  4.7   |  27  |  2.21<H>    Ca    8.5      24 Jul 2022 07:21  Phos  3.7     07-24  Mg     2.20     07-24      PT/INR - ( 24 Jul 2022 07:21 )   PT: 10.8 sec;   INR: 0.93 ratio         PTT - ( 24 Jul 2022 07:21 )  PTT:27.1 sec          IMAGING:     TEAM Surgery Progress Note  Patient is a 61y old  Male who presents with a chief complaint of abdominal pain (24 Jul 2022 16:00)      INTERVAL EVENTS: Patient is POD# 5. No acute events overnight.  SUBJECTIVE: Patient seen and examined at bedside with surgical team, patient without complaints. Denies fever, chills, CP, SOB nausea, vomiting, abdominal pain.      ICU Vital Signs Last 24 Hrs  T(C): 37.1 (25 Jul 2022 09:21), Max: 37.1 (24 Jul 2022 12:16)  T(F): 98.8 (25 Jul 2022 09:21), Max: 98.8 (24 Jul 2022 12:16)  HR: 88 (25 Jul 2022 09:21) (88 - 93)  BP: 147/79 (25 Jul 2022 09:21) (147/79 - 171/100)  BP(mean): --  ABP: --  ABP(mean): --  RR: 18 (25 Jul 2022 09:21) (18 - 18)  SpO2: 100% (25 Jul 2022 09:21) (99% - 100%)    O2 Parameters below as of 25 Jul 2022 09:21  Patient On (Oxygen Delivery Method): room air    I&O's Detail    24 Jul 2022 07:01  -  25 Jul 2022 07:00  --------------------------------------------------------  IN:    Oral Fluid: 630 mL  Total IN: 630 mL    OUT:    Voided (mL): 1450 mL  Total OUT: 1450 mL    Total NET: -820 mL    REVIEW OF SYSTEMS:  Constitutional: No fevers or chills. No malaise or weakness.  EENT: No vision changes. No ear pain. No nasal congestion or rhinitis. No throat pain or dysphagia.  Respiratory: No cough, wheezing, or SOB. No hemoptysis.  Cardiovascular: No chest pain or palpitations.  Gastrointestinal: No abdominal pain. No nausea, vomiting, diarrhea or constipation. No hematochezia. No melena.  Genitourinary: No dysuria, hematuria, or frequency.  Neurologic: No numbness or tingling. No weakness.  Skin: No rashes or pruritus.     MEDICATIONS  (STANDING):  acetaminophen     Tablet .. 650 milliGRAM(s) Oral every 6 hours  famotidine Injectable 40 milliGRAM(s) IV Push once  furosemide   Injectable 40 milliGRAM(s) IV Push two times a day  pantoprazole  Injectable 40 milliGRAM(s) IV Push daily    MEDICATIONS  (PRN):  metoprolol tartrate Injectable 5 milliGRAM(s) IV Push every 6 hours PRN SBP > 180  naloxone Injectable 0.1 milliGRAM(s) IV Push every 3 minutes PRN For ANY of the following changes in patient status:  A. RR LESS THAN 10 breaths per minute, B. Oxygen saturation LESS THAN 90%, C. Sedation score of 6  ondansetron Injectable 4 milliGRAM(s) IV Push every 6 hours PRN Nausea  oxyCODONE    IR 10 milliGRAM(s) Oral every 3 hours PRN Moderate Pain (4 - 6)  oxyCODONE    IR 15 milliGRAM(s) Oral every 3 hours PRN Severe Pain (7 - 10)      PHYSICAL EXAM:  Constitutional: A&Ox3, NAD  Respiratory: Unlabored breathing  Abdomen: Soft, nondistended, NTTP. No rebound or guarding. Prevena removed-- midline incision cdi with staples in place.  Extremities: WWP, GAR spontaneously    LABS:                                   10.9   7.93  )-----------( 296      ( 25 Jul 2022 06:00 )             35.4   07-25    136  |  97<L>  |  36<H>  ----------------------------<  93  4.7   |  28  |  2.22<H>    Ca    8.8      25 Jul 2022 06:00  Phos  3.7     07-25  Mg     1.90     07-25

## 2022-07-25 NOTE — PROGRESS NOTE ADULT - ASSESSMENT
61M with PMHx of ETOH abuse, chronic pancreatitis with chronic PD dilation, PD stone and calcifications, perforated gastric ulcer s/p exlap with David patch repair (2019, Dr. Murray), CKD4, hep C s/p SVR and benign esophageal stricture (post-stent 4/19/22 c/b need for CRE balloon dilation, reposition and suture on 4/29) presents with a flare of chronic pancreatitis. Patient is s/p puestow procedure on 7/20. Patient hyperkalemia is well controlled.     PLAN  - PCEA discontinued today   - Provena removal tomorrow 07.25.2022   - Diet: Soft diet  - Pain control with PO medications  - FU nephrology recommendations for hyperkalemia and CKD  - Monitor vital signs  - DVT prophylaxis 61M with PMHx of ETOH abuse, chronic pancreatitis with chronic PD dilation, PD stone and calcifications, perforated gastric ulcer s/p exlap with David patch repair (2019, Dr. Murray), CKD4, hep C s/p SVR and benign esophageal stricture (post-stent 4/19/22 c/b need for CRE balloon dilation, reposition and suture on 4/29) presents with a flare of chronic pancreatitis. Patient is s/p puestow procedure on 7/20. Patient hyperkalemia is now well controlled.     PLAN  - PCEA discontinued -- will transition to PO pain mgmt  - Diet: Soft diet (low fat)  - FU nephrology recommendations for hyperkalemia and CKD  - Monitor vital signs  - DVT prophylaxis

## 2022-07-25 NOTE — PROGRESS NOTE ADULT - ATTENDING COMMENTS
61 year old male with esophageal stricture and chronic pancreatitis. Will plan on removing the stent in 1 month. Pancreatic surgery to comment on operative treatment for the chronic pancreatitis.
64 year old male with esophageal stent for a peptic stricture and chronic pancreatitis.    Plan: Evaluation by pancreatic surgery.
Hyperkalemia  CKD4  Metabolic acidosis    Cannot take po given abd condition, would switch meds to IV as above. Lasix IV and bicarb IV. Monitor K.
Hyperkalemia  CKD4  Metabolic acidosis    Cont po Lasix. Please have pt follow up with nephrology upon discharge from the hospital.
Hyperkalemia  CKD4  Metabolic acidosis    Now on liquids. Low K diet. Stop bicarb gtt. Cont Lasix. Resume lokelma. Monitor K.
Hyperkalemia  CKD 4  Metabolic Acidosis    Low K diet, avoid nephrotoxic agents. Increase bicarb repletion as above. Lokelma TID ATC.     Will monitor with you.
Pt seen and examined 7/20/22.    Plan per residents note above.   Lokelma, low K diet. Bicarb po and lasix

## 2022-07-25 NOTE — PROGRESS NOTE ADULT - SUBJECTIVE AND OBJECTIVE BOX
Marco Antonio Burks MD  Interventional Cardiology / Endovascular Specialist  [place] Office : [phone] [place] [address] [place] [address]  Tel:   [place] Office : [address] [facility] [place] [address]  Tel: [phone]    Pt lying in bed in NAD  denies CP, SOB . C/o surgical site pain on PCA pump   	  MEDICATIONS:  furosemide    Tablet 40 milliGRAM(s) Oral every 12 hours  heparin   Injectable 5000 Unit(s) SubCutaneous every 8 hours  NIFEdipine XL 30 milliGRAM(s) Oral daily        acetaminophen     Tablet .. 650 milliGRAM(s) Oral every 6 hours  oxyCODONE    IR 10 milliGRAM(s) Oral every 3 hours PRN  oxyCODONE    IR 15 milliGRAM(s) Oral every 3 hours PRN    pantoprazole    Tablet 40 milliGRAM(s) Oral before breakfast          PAST MEDICAL/SURGICAL HISTORY  PAST MEDICAL & SURGICAL HISTORY:  ETOH abuse  sober x1 year approximately      Pancreatitis      Hepatitis C  treated      Gout      HTN (hypertension)      Chronic kidney disease (CKD)      H/O chronic pancreatitis      Gout      Alcohol abuse      Gastric perforation          SOCIAL HISTORY: Substance Use (street drugs): ( x ) never used  (  ) other:    FAMILY HISTORY:  FH: HTN (hypertension)        REVIEW OF SYSTEMS:  CONSTITUTIONAL: No fever, weight loss, or fatigue  EYES: No eye pain, visual disturbances, or discharge  ENMT:  No difficulty hearing, tinnitus, vertigo; No sinus or throat pain  BREASTS: No pain, masses, or nipple discharge  GASTROINTESTINAL: No abdominal or epigastric pain. No nausea, vomiting, or hematemesis; No diarrhea or constipation. No melena or hematochezia.  GENITOURINARY: No dysuria, frequency, hematuria, or incontinence  NEUROLOGICAL: No headaches, memory loss, loss of strength, numbness, or tremors  ENDOCRINE: No heat or cold intolerance; No hair loss  MUSCULOSKELETAL: No joint pain or swelling; No muscle, back, or extremity pain  PSYCHIATRIC: No depression, anxiety, mood swings, or difficulty sleeping  HEME/LYMPH: No easy bruising, or bleeding gums  All others negative    PHYSICAL EXAM:  T(C): 37.1 (07-25-22 @ 09:21), Max: 37.1 (07-25-22 @ 00:08)  HR: 88 (07-25-22 @ 09:21) (88 - 93)  BP: 147/79 (07-25-22 @ 09:21) (147/79 - 170/97)  RR: 18 (07-25-22 @ 09:21) (18 - 18)  SpO2: 100% (07-25-22 @ 09:21) (99% - 100%)  Wt(kg): --  I&O's Summary    24 Jul 2022 07:01  -  25 Jul 2022 07:00  --------------------------------------------------------  IN: 630 mL / OUT: 1450 mL / NET: -820 mL      Appearance: In no distress	  HEENT:    PERRL, EOMI	  Cardiovascular:  S1 S2, No JVD  Respiratory: Lungs clear to auscultation	  Gastrointestinal:  Soft, Non-tender, + BS	  Vasculature:  No edema of LE  Psychiatric: Appropriate affect   Neuro: no acute focal deficits                           10.9   7.93  )-----------( 296      ( 25 Jul 2022 06:00 )             35.4     07-25    136  |  97<L>  |  36<H>  ----------------------------<  93  4.7   |  28  |  2.22<H>    Ca    8.8      25 Jul 2022 06:00  Phos  3.7     07-25  Mg     1.90     07-25      proBNP:   Lipid Profile:   HgA1c:   TSH:     Consultant(s) Notes Reviewed:  [x ] YES  [ ] NO    Care Discussed with Consultants/Other Providers [ x] YES  [ ] NO    Imaging Personally Reviewed independently:  [x] YES  [ ] NO    All labs, radiologic studies, vitals, orders and medications list reviewed. Patient is seen and examined at bedside. Case discussed with medical team.

## 2022-07-25 NOTE — PROGRESS NOTE ADULT - PROBLEM SELECTOR PROBLEM 2
Chronic kidney disease
Esophageal stricture
Chronic kidney disease
Esophageal stricture
Chronic kidney disease
Esophageal stricture

## 2022-07-27 ENCOUNTER — APPOINTMENT (OUTPATIENT)
Dept: INTERNAL MEDICINE | Facility: CLINIC | Age: 62
End: 2022-07-27

## 2022-07-27 ENCOUNTER — LABORATORY RESULT (OUTPATIENT)
Age: 62
End: 2022-07-27

## 2022-07-27 ENCOUNTER — OUTPATIENT (OUTPATIENT)
Dept: OUTPATIENT SERVICES | Facility: HOSPITAL | Age: 62
LOS: 1 days | End: 2022-07-27

## 2022-07-27 VITALS
BODY MASS INDEX: 15.77 KG/M2 | TEMPERATURE: 97.1 F | OXYGEN SATURATION: 98 % | DIASTOLIC BLOOD PRESSURE: 70 MMHG | WEIGHT: 119 LBS | HEIGHT: 73 IN | HEART RATE: 102 BPM | SYSTOLIC BLOOD PRESSURE: 110 MMHG

## 2022-07-27 DIAGNOSIS — K25.5 CHRONIC OR UNSPECIFIED GASTRIC ULCER WITH PERFORATION: Chronic | ICD-10-CM

## 2022-07-27 DIAGNOSIS — G89.18 OTHER ACUTE POSTPROCEDURAL PAIN: ICD-10-CM

## 2022-07-27 PROCEDURE — 99213 OFFICE O/P EST LOW 20 MIN: CPT

## 2022-07-27 RX ORDER — OXYCODONE AND ACETAMINOPHEN 10; 325 MG/1; MG/1
10-325 TABLET ORAL
Qty: 168 | Refills: 0 | Status: DISCONTINUED | COMMUNITY
Start: 2022-03-09 | End: 2022-07-27

## 2022-07-29 DIAGNOSIS — F11.90 OPIOID USE, UNSPECIFIED, UNCOMPLICATED: ICD-10-CM

## 2022-07-29 DIAGNOSIS — N18.4 CHRONIC KIDNEY DISEASE, STAGE 4 (SEVERE): ICD-10-CM

## 2022-07-29 NOTE — PHYSICAL EXAM
[Normal] : normal rate, regular rhythm, normal S1 and S2 and no murmur heard [de-identified] : Large central scar with surgical staples present.

## 2022-07-29 NOTE — HISTORY OF PRESENT ILLNESS
[Post-hospitalization from ___ Hospital] : Post-hospitalization from [unfilled] Hospital [Admitted on: ___] : The patient was admitted on [unfilled] [Discharged on ___] : discharged on [unfilled] [FreeTextEntry2] : Patient admitted for abdominal pain 2/2 acute on chronic pancreatitis. Upon evaluation from surgery patient was determined to be a good candidate for a Peustow procedure which was performed on 7/15/22.  Patient was seen by pain medicine during the time and had pain controlled on 15mg oxycodone q3 hours. \par \par At last visit patient was on 90 MME of oxycodone-acetiminophen (10mg q4h) and the pain was somewhat tolerable although patient continued to score 10/10 on PEG scale. Patient in the interim had multiple hospitalization for abdominal pain due to running out of medication early. I expressed my concern for the patient given that he is always running out of medication days before the prescription ends. Patient states this is simply because the pain is not controlled enough and he needs to take extra to make it through the day. Patient is currently out of medication despite the fact he was supposed to have about 2 weeks of medication based on his recent admission. Patient admitted taking the percocet in the hospital without notifying the doctors. I explained that this is dangerous behavior and storngly concerns me for risk of opioid overdose. Patient feels that he is not addicted and could stop taking the medication at any time if the pain was not to be controlled. \par \par We discussed dose adjustment for the patient. The patient feels that more pills (of lower dose) more often would lead to better pain control. I discussed my disccomfort on continuing to raise the medicatiojn dosage as we already were at 90 MME and that this change to 15mg q4h is already a 50 percent increase and that I would not go any higher than this without pain management consultation. Not only for risk of overdose but also for risk of renal failure given CKD4 staging. Patient requested that the 15mg gets split into 3 5mg tablets and I expressed this is simply too many pills and that I would be uncomfrtable prescribing. Based on further discussion we settled on 2 7.5mg tablets to meet the equivalent 135MME equivalent. \par \par We also discussed restarting drug testing and the possibility of Opioid use disorder and eventually need for weaning. \par If this behavior continues will reinforce pain contract and stress that we have the right to stop prescribing medication if patient is not making progress

## 2022-07-30 NOTE — PATIENT PROFILE ADULT - HAS THE PATIENT USED TOBACCO IN THE PAST 30 DAYS?
Pt ambulatory to restroom steadily independently      [facility]  07/30/22 3886 Yes

## 2022-08-04 ENCOUNTER — APPOINTMENT (OUTPATIENT)
Dept: INTERNAL MEDICINE | Facility: CLINIC | Age: 62
End: 2022-08-04

## 2022-08-04 VITALS
DIASTOLIC BLOOD PRESSURE: 78 MMHG | HEART RATE: 82 BPM | BODY MASS INDEX: 15.11 KG/M2 | WEIGHT: 114 LBS | OXYGEN SATURATION: 98 % | HEIGHT: 73 IN | RESPIRATION RATE: 16 BRPM | SYSTOLIC BLOOD PRESSURE: 118 MMHG

## 2022-08-04 DIAGNOSIS — B18.2 CHRONIC VIRAL HEPATITIS C: ICD-10-CM

## 2022-08-04 PROCEDURE — 99212 OFFICE O/P EST SF 10 MIN: CPT

## 2022-08-04 NOTE — PHYSICAL EXAM
[No Acute Distress] : no acute distress [de-identified] : thin [de-identified] : walking with walker.  Moving all extremities.

## 2022-08-04 NOTE — HISTORY OF PRESENT ILLNESS
[FreeTextEntry8] : Poor historian\par Pt is a 60 y/o male with a hx of chronic pancreatitis, hepatitis C, stage 4 CKD, Esoph stricture, gastritis, cholangitis, etoh abuse s/p recent admission for Peustow procedure here c/o pain.\par Chart reviewed, [facility]  reviewed - \par Pt saw Dr Hurtado 7/27/22 re: hospital f/u and pain medications.  \par discussed with the pt that he is already follows with PCP and has been given pain meds at the recent visit - he has f/u visit on 8/10/22.  Discussed that I am in the same specialty as Dr Hurtado and will not add to the regimen that he gets from his PCP, especially since he recently received meds and he has f/u with PMD in 6 days.  \par Reminded the pt rE: pain management appt that he has scheduled and his surgical f/u that it scheduled.  \par Rt reported that he is getting picked up at 4PM and that he needs to leave to be outside for the ride.  Pt did not continue with the history and dis not stay for exam.  He left and ended the appt.  To f/u with PMD.

## 2022-08-04 NOTE — ASSESSMENT
[FreeTextEntry1] : Pain management d/w pt.  discussed getting rx from one MD.  Has been getting from PMD and has active pain rx - he will cont with current rx as prescribed by PMD.  He has f/u visit in 1 week.  He will continue f/u with PMD.\clifton Has appt with pain management in September.\clifton Reinforced the principles of pain management agreement.  \clifton discussed the upcoming f/u appointments with surgery and nephrology.\clifton Pt cut the visit short as noted.  \clifton will inform his PMD about this visit\clifton

## 2022-08-05 LAB
AMPHET UR-MCNC: NEGATIVE
BARBITURATES UR-MCNC: NEGATIVE
BENZODIAZ UR-MCNC: NEGATIVE
COCAINE METAB.OTHER UR-MCNC: NEGATIVE
CREATININE, URINE: 253.3 MG/DL
METHADONE UR-MCNC: NEGATIVE
METHAQUALONE UR-MCNC: NEGATIVE
OPIATES UR-MCNC: NORMAL
PCP UR-MCNC: NEGATIVE
PROPOXYPH UR QL: NEGATIVE
THC UR QL: NEGATIVE

## 2022-08-05 RX ORDER — ACETAMINOPHEN 500 MG/1
500 TABLET, COATED ORAL EVERY 8 HOURS
Qty: 180 | Refills: 2 | Status: DISCONTINUED | COMMUNITY
Start: 2022-02-16 | End: 2022-08-05

## 2022-08-08 LAB — SURGICAL PATHOLOGY STUDY: SIGNIFICANT CHANGE UP

## 2022-08-10 ENCOUNTER — OUTPATIENT (OUTPATIENT)
Dept: OUTPATIENT SERVICES | Facility: HOSPITAL | Age: 62
LOS: 1 days | End: 2022-08-10

## 2022-08-10 ENCOUNTER — APPOINTMENT (OUTPATIENT)
Dept: INTERNAL MEDICINE | Facility: CLINIC | Age: 62
End: 2022-08-10

## 2022-08-10 VITALS
TEMPERATURE: 97.1 F | HEART RATE: 90 BPM | DIASTOLIC BLOOD PRESSURE: 70 MMHG | OXYGEN SATURATION: 99 % | BODY MASS INDEX: 14.98 KG/M2 | HEIGHT: 73 IN | SYSTOLIC BLOOD PRESSURE: 119 MMHG | WEIGHT: 113 LBS

## 2022-08-10 DIAGNOSIS — K25.5 CHRONIC OR UNSPECIFIED GASTRIC ULCER WITH PERFORATION: Chronic | ICD-10-CM

## 2022-08-10 DIAGNOSIS — N18.4 CHRONIC KIDNEY DISEASE, STAGE 4 (SEVERE): ICD-10-CM

## 2022-08-10 PROCEDURE — 99214 OFFICE O/P EST MOD 30 MIN: CPT

## 2022-08-10 RX ORDER — TAMSULOSIN HYDROCHLORIDE 0.4 MG/1
0.4 CAPSULE ORAL
Qty: 30 | Refills: 0 | Status: ACTIVE | COMMUNITY
Start: 2022-03-05

## 2022-08-10 RX ORDER — OXYCODONE AND ACETAMINOPHEN 7.5; 325 MG/1; MG/1
7.5-325 TABLET ORAL EVERY 4 HOURS
Qty: 168 | Refills: 0 | Status: DISCONTINUED | COMMUNITY
Start: 2022-07-27 | End: 2022-08-10

## 2022-08-10 NOTE — PHYSICAL EXAM
[Normal] : normal rate, regular rhythm, normal S1 and S2 and no murmur heard [de-identified] : Large central scar with surgical staples present.

## 2022-08-10 NOTE — H&P ADULT - NSHPOUTPATIENTPROVIDERS_GEN_ALL_CORE
Pt does not follow

## 2022-08-10 NOTE — HISTORY OF PRESENT ILLNESS
[FreeTextEntry1] : Pain follow up  [de-identified] : Patient is a 61 y.o M with PMHx of ETOH abuse (quit about 4-5 years ago), chronic pancreatitis, Hep C (past history of treatment), gout, gastric perf in 2019 (s/p repair), s/p Peustow procedure 7/2022 presenting for pain follow up.\par \par Upon discussion patient noted that the current dose (15mg q4h oxycodone/acetimonophen) was making him sleep and also causing stomach irritation. Patient believes this is because of the tylenol portion of the medicaiton as it leaves a metal taste. I discussed with the patient that the sleepiness is likely a side effect of opioid dose being increased. Patient denies this. PEG score remains 10. ISTOP reveal I am only consistent prescriber outside of hospital prescribed opioids on discharge. Urine testing positive for opioids. Patient denies selling any medication. Given patient sometimes takes more medication than prescribed I will change to pure oxycodone to prevent possibility of Tylenol overdose and liver complications. Dropping dose back to 10mg q4h (split into 2 5mg pills) as patient prefers 2 pills as he feels it works better and he has been less likely to run out prior to refill date on double pills versus single pills of equivalent doses. \par \par Instructed patient that at this point pain medication management will require a second opionion from outpatient Pain medicine. Appointment on 9/16. Patient also has surgical follow up 8/11/22. Important to follow up for staple removal w/ surgery. Renal appointment in the fall.

## 2022-08-11 ENCOUNTER — APPOINTMENT (OUTPATIENT)
Dept: SURGICAL ONCOLOGY | Facility: CLINIC | Age: 62
End: 2022-08-11

## 2022-08-11 VITALS
SYSTOLIC BLOOD PRESSURE: 120 MMHG | RESPIRATION RATE: 17 BRPM | HEART RATE: 76 BPM | WEIGHT: 113 LBS | DIASTOLIC BLOOD PRESSURE: 79 MMHG | HEIGHT: 73 IN | BODY MASS INDEX: 14.98 KG/M2 | OXYGEN SATURATION: 98 %

## 2022-08-15 NOTE — PROGRESS NOTE ADULT - PROBLEM SELECTOR PROBLEM 1
Chronic pancreatitis
Chronic pancreatitis
Hyperkalemia
Chronic pancreatitis
Chronic pancreatitis
Hyperkalemia
Chronic pancreatitis
Chronic pancreatitis
Hyperkalemia
Chronic pancreatitis
no

## 2022-08-15 NOTE — PATIENT PROFILE ADULT - FOOD INSECURITY
Behavioral Health IP Nursing Progress Note    Suicidal Ideation:  Denies      Current C-SSRS score: Negative Screen - White (08/15/22 0900)      Protective Factors / Reason for Living: Positive therapeutic relationships    Interventions:   · q15 minute safety checks  · 1:1 assessment    Subjective: Patient reported \"my mom tricked me into coming here.\" Pt states he \"forgot\" what happened that would make mom want pt to be inpatient. Pt reports his mood has been \"good\" since admission and denied having any anger, worries, or sadness since being here. No SI/HI/AVH at this time.     Objective:   Mental Health: Patient behavior observed to be pleasant and cooperative with staff, appropriate with peers. Pt attended the morning groups but reported feeling tired after lunch and napped through the afternoon groups. Pt ate well for meals and was compliant with scheduled meds. Affect is somewhat constricted and pt avoids discussing reasons for hospitalization. No unsafe bx observed.      Medical:   • None this shift     Assessment / Actions: Pt was monitored for safety and encouraged to engage in tx. Pt was offered emotional support and encouraged to come to staff with safety concerns. Pt verbalizes agreement.    PRN Medications given?   No    Plan: Continue to monitor pt for safety and encourage engagement in tx. Maintain current plan of care.     Treatment Plan reviewed.         no

## 2022-08-17 DIAGNOSIS — F11.90 OPIOID USE, UNSPECIFIED, UNCOMPLICATED: ICD-10-CM

## 2022-08-17 DIAGNOSIS — N18.4 CHRONIC KIDNEY DISEASE, STAGE 4 (SEVERE): ICD-10-CM

## 2022-08-17 DIAGNOSIS — K86.1 OTHER CHRONIC PANCREATITIS: ICD-10-CM

## 2022-08-17 DIAGNOSIS — R06.6 HICCOUGH: ICD-10-CM

## 2022-08-30 ENCOUNTER — INPATIENT (INPATIENT)
Facility: HOSPITAL | Age: 62
LOS: 5 days | Discharge: HOME HEALTH | End: 2022-09-05
Attending: INTERNAL MEDICINE | Admitting: INTERNAL MEDICINE
Payer: COMMERCIAL

## 2022-08-30 VITALS
SYSTOLIC BLOOD PRESSURE: 118 MMHG | WEIGHT: 130.07 LBS | TEMPERATURE: 97 F | OXYGEN SATURATION: 100 % | RESPIRATION RATE: 17 BRPM | HEIGHT: 73 IN | HEART RATE: 86 BPM | DIASTOLIC BLOOD PRESSURE: 71 MMHG

## 2022-08-30 DIAGNOSIS — K25.5 CHRONIC OR UNSPECIFIED GASTRIC ULCER WITH PERFORATION: Chronic | ICD-10-CM

## 2022-08-30 LAB
ALBUMIN SERPL ELPH-MCNC: 3.1 G/DL — LOW (ref 3.3–5)
ALP SERPL-CCNC: 88 U/L — SIGNIFICANT CHANGE UP (ref 40–120)
ALT FLD-CCNC: 41 U/L — SIGNIFICANT CHANGE UP (ref 12–78)
ANION GAP SERPL CALC-SCNC: 7 MMOL/L — SIGNIFICANT CHANGE UP (ref 5–17)
APTT BLD: 26.4 SEC — LOW (ref 27.5–35.5)
AST SERPL-CCNC: 44 U/L — HIGH (ref 15–37)
BASOPHILS # BLD AUTO: 0.03 K/UL — SIGNIFICANT CHANGE UP (ref 0–0.2)
BASOPHILS NFR BLD AUTO: 0.3 % — SIGNIFICANT CHANGE UP (ref 0–2)
BILIRUB SERPL-MCNC: 0.1 MG/DL — LOW (ref 0.2–1.2)
BUN SERPL-MCNC: 108 MG/DL — HIGH (ref 7–23)
CALCIUM SERPL-MCNC: 8.4 MG/DL — LOW (ref 8.5–10.1)
CHLORIDE SERPL-SCNC: 112 MMOL/L — HIGH (ref 96–108)
CO2 SERPL-SCNC: 19 MMOL/L — LOW (ref 22–31)
CREAT SERPL-MCNC: 3.92 MG/DL — HIGH (ref 0.5–1.3)
EGFR: 17 ML/MIN/1.73M2 — LOW
EOSINOPHIL # BLD AUTO: 0 K/UL — SIGNIFICANT CHANGE UP (ref 0–0.5)
EOSINOPHIL NFR BLD AUTO: 0 % — SIGNIFICANT CHANGE UP (ref 0–6)
ETHANOL SERPL-MCNC: <10 MG/DL — SIGNIFICANT CHANGE UP (ref 0–10)
GLUCOSE SERPL-MCNC: 108 MG/DL — HIGH (ref 70–99)
HCT VFR BLD CALC: 33.3 % — LOW (ref 39–50)
HGB BLD-MCNC: 10.7 G/DL — LOW (ref 13–17)
IMM GRANULOCYTES NFR BLD AUTO: 0.5 % — SIGNIFICANT CHANGE UP (ref 0–1.5)
INR BLD: 0.93 RATIO — SIGNIFICANT CHANGE UP (ref 0.88–1.16)
LIDOCAIN IGE QN: 80 U/L — SIGNIFICANT CHANGE UP (ref 73–393)
LYMPHOCYTES # BLD AUTO: 1.18 K/UL — SIGNIFICANT CHANGE UP (ref 1–3.3)
LYMPHOCYTES # BLD AUTO: 11 % — LOW (ref 13–44)
MCHC RBC-ENTMCNC: 30.9 PG — SIGNIFICANT CHANGE UP (ref 27–34)
MCHC RBC-ENTMCNC: 32.1 G/DL — SIGNIFICANT CHANGE UP (ref 32–36)
MCV RBC AUTO: 96.2 FL — SIGNIFICANT CHANGE UP (ref 80–100)
MONOCYTES # BLD AUTO: 0.58 K/UL — SIGNIFICANT CHANGE UP (ref 0–0.9)
MONOCYTES NFR BLD AUTO: 5.4 % — SIGNIFICANT CHANGE UP (ref 2–14)
NEUTROPHILS # BLD AUTO: 8.92 K/UL — HIGH (ref 1.8–7.4)
NEUTROPHILS NFR BLD AUTO: 82.8 % — HIGH (ref 43–77)
NRBC # BLD: 0 /100 WBCS — SIGNIFICANT CHANGE UP (ref 0–0)
PLATELET # BLD AUTO: 270 K/UL — SIGNIFICANT CHANGE UP (ref 150–400)
POTASSIUM SERPL-MCNC: 5.8 MMOL/L — HIGH (ref 3.5–5.3)
POTASSIUM SERPL-SCNC: 5.8 MMOL/L — HIGH (ref 3.5–5.3)
PROT SERPL-MCNC: 6.7 GM/DL — SIGNIFICANT CHANGE UP (ref 6–8.3)
PROTHROM AB SERPL-ACNC: 11.1 SEC — SIGNIFICANT CHANGE UP (ref 10.5–13.4)
RBC # BLD: 3.46 M/UL — LOW (ref 4.2–5.8)
RBC # FLD: 15.2 % — HIGH (ref 10.3–14.5)
SODIUM SERPL-SCNC: 138 MMOL/L — SIGNIFICANT CHANGE UP (ref 135–145)
WBC # BLD: 10.76 K/UL — HIGH (ref 3.8–10.5)
WBC # FLD AUTO: 10.76 K/UL — HIGH (ref 3.8–10.5)

## 2022-08-30 PROCEDURE — 72125 CT NECK SPINE W/O DYE: CPT | Mod: 26,MA

## 2022-08-30 PROCEDURE — 99285 EMERGENCY DEPT VISIT HI MDM: CPT

## 2022-08-30 PROCEDURE — 71250 CT THORAX DX C-: CPT | Mod: 26,MA

## 2022-08-30 PROCEDURE — 70450 CT HEAD/BRAIN W/O DYE: CPT | Mod: 26,MA

## 2022-08-30 PROCEDURE — 74176 CT ABD & PELVIS W/O CONTRAST: CPT | Mod: 26,MA

## 2022-08-30 PROCEDURE — 70486 CT MAXILLOFACIAL W/O DYE: CPT | Mod: 26,MA

## 2022-08-30 PROCEDURE — 93010 ELECTROCARDIOGRAM REPORT: CPT

## 2022-08-30 RX ORDER — TETANUS TOXOID, REDUCED DIPHTHERIA TOXOID AND ACELLULAR PERTUSSIS VACCINE, ADSORBED 5; 2.5; 8; 8; 2.5 [IU]/.5ML; [IU]/.5ML; UG/.5ML; UG/.5ML; UG/.5ML
0.5 SUSPENSION INTRAMUSCULAR ONCE
Refills: 0 | Status: COMPLETED | OUTPATIENT
Start: 2022-08-30 | End: 2022-08-30

## 2022-08-30 RX ORDER — SODIUM CHLORIDE 9 MG/ML
1000 INJECTION INTRAMUSCULAR; INTRAVENOUS; SUBCUTANEOUS ONCE
Refills: 0 | Status: COMPLETED | OUTPATIENT
Start: 2022-08-30 | End: 2022-08-30

## 2022-08-30 RX ADMIN — TETANUS TOXOID, REDUCED DIPHTHERIA TOXOID AND ACELLULAR PERTUSSIS VACCINE, ADSORBED 0.5 MILLILITER(S): 5; 2.5; 8; 8; 2.5 SUSPENSION INTRAMUSCULAR at 22:32

## 2022-08-30 NOTE — ED PROVIDER NOTE - CLINICAL SUMMARY MEDICAL DECISION MAKING FREE TEXT BOX
Patient is presenting with altered mental status, concerning for XXXX . The differential includes: toxic metabolic etiologies such as electrolyte disturbances, hypoglycemia, uremia, acidosis states, infectious etiology, toxidromes of intoxication or withdrawal, hypoxemia or hypercarbia, liver disease or failure causing hepatic encephalopathy, endocrine emergencies such as hypothyroidism or hyperthyroidism, adrenal insufficiency), seiaure, trauma, intracranial bleeding, ischemic stroke, atypical ACS/NSTEMI.  PLAN:  - FSG, cardiac monitor, CBC, CMP, XXX lipase, EKG, XXX troponin, XXX BNP, XXX TSH, XXX ABG  - UA/UCX, BCx, CXR, Rectal temperature  - XXX CT HEAD  - Re-evaluation and disposition accordingly. Patient is presenting with altered mental status, concerning for  . The differential includes: toxic metabolic etiologies such as electrolyte disturbances, hypoglycemia, uremia, acidosis states, infectious etiology, toxidrome of intoxication or withdrawal, hypoxemia or hypercarbia, liver disease or failure causing hepatic encephalopathy, endocrine emergencies such as hypothyroidism or hyperthyroidism, adrenal insufficiency), seizure, trauma, intracranial bleeding, ischemic stroke, atypical ACS/NSTEMI.  PLAN:  - FSG, cardiac monitor, CBC, CMP,  lipase, EKG,  troponin,  BNP,     - UA/UCX, BCx, CXR, Rectal temperature  -  Pan ct for rule out traumatic injuries  - Re-evaluation and disposition accordingly.

## 2022-08-30 NOTE — ED ADULT NURSE NOTE - NSIMPLEMENTINTERV_GEN_ALL_ED
Implemented All Fall Risk Interventions:  [place] to call system. Call bell, personal items and telephone within reach. Instruct patient to call for assistance. Room bathroom lighting operational. Non-slip footwear when patient is off stretcher. Physically safe environment: no spills, clutter or unnecessary equipment. Stretcher in lowest position, wheels locked, appropriate side rails in place. Provide visual cue, wrist band, yellow gown, etc. Monitor gait and stability. Monitor for mental status changes and reorient to person, place, and time. Review medications for side effects contributing to fall risk. Reinforce activity limits and safety measures with patient and family.

## 2022-08-30 NOTE — ED PROVIDER NOTE - PROGRESS NOTE DETAILS
(+) BUN/Cr elevated, (+) BUN/Cr elevated, uremic? YUNIEL worsening from baseline, of Cr 2.2 to 3.9. otherwise, alert and oriented, speaking full sentences, but lacking any details. hemodynamically stable, on cardiac monitor. Pan CT negative for traumatic injuries. EKG negative for JAZLYN/STD, doubt ischemia/cardiac syncope.

## 2022-08-30 NOTE — ED PROVIDER NOTE - PHYSICAL EXAMINATION
VITAL SIGNS: I have reviewed nursing notes and confirm.   GEN: Well-developed; well-nourished; in no acute distress. Speaking full sentences.  SKIN: Warm, pink, no rash, no diaphoresis, no cyanosis, well perfused.   HEAD: Normocephalic; atraumatic. No scalp lacerations, no abrasions.  NECK: Supple; non tender.   EYES: Pupils 3mm equal, round, reactive to light and accomodation, conjunctiva and sclera clear. Extra-ocular movements intact bilaterally.  ENT: No nasal discharge; airway clear. Trachea is midline. ORAL: No oropharyngeal exudates or erythema. Normal dentition.  CV: Regular rate and rhythm. S1, S2 normal; no murmurs, gallops, or rubs. No lower extremity pitting edema bilaterally. Capillary refill < 2 seconds throughout. Distal pulses intact 2+ throughout.  RESP: CTA bilaterally. No wheezes, rales, or rhonchi.   ABD: Normal bowel sounds, soft, non-distended, non-tender, no rebound, no guarding, no rigidity, no hepatosplenomegaly. No CVA tenderness bilaterally.  MSK: Normal range of motion and movement of all 4 extremities. No joint or muscular pain throughout. No clubbing.   BACK: No thoracolumbar midline or paravertebral tenderness. No step-offs or obvious deformities.  NEURO: Alert & oriented x 3, Grossly unremarkable. Sensory and motor intact throughout. No focal deficits. Normal speech and coordination.   PSYCH: Cooperative, appropriate. VITAL SIGNS: I have reviewed nursing notes and confirm.   GEN: mal-nourished, cachetic, ; in no acute distress. Speaking few words per sentences. but no respiratory distress  SKIN: Warm, pink, no rash, no diaphoresis, no cyanosis, well perfused.   HEAD: Normocephalic; atraumatic. No scalp lacerations, no abrasions.  NECK: Supple; non tender.   EYES: Pupils 3mm equal, round, reactive to light and accomodation, conjunctiva and sclera clear. Extra-ocular movements intact bilaterally.  ENT: No nasal discharge; airway clear. Trachea is midline. ORAL: No oropharyngeal exudates or erythema. Normal dentition.  CV: Regular rate and rhythm. S1, S2 normal; no murmurs, gallops, or rubs. No lower extremity pitting edema bilaterally. Capillary refill < 2 seconds throughout. Distal pulses intact 2+ throughout.  RESP: CTA bilaterally. No wheezes, rales, or rhonchi.   ABD: Normal bowel sounds, soft, non-distended, non-tender, no rebound, no guarding, no rigidity, no hepatosplenomegaly. No CVA tenderness bilaterally. (+) Cachetic abdominal cavity.   MSK: Normal range of motion and movement of all 4 extremities. No joint or muscular pain throughout. No clubbing.   BACK: No thoracolumbar midline or paravertebral tenderness. No step-offs or obvious deformities.  NEURO: Alert & oriented x 3, Grossly unremarkable. Sensory and motor intact throughout. No focal deficits.    PSYCH: Cooperative, appropriate.

## 2022-08-30 NOTE — ED ADULT NURSE NOTE - OBJECTIVE STATEMENT
A&Ox3, c/o fall. Pt states he was in the story and fell on concrete today. p/w laceration on forehead. Pt responds to verbal, follows commands. Respirations even and unlabored. Pt denies: Chest pain, sob, nausea, vomiting, blurry vision, headache, weakness. Pt ambulates using walker, which is at bedside.

## 2022-08-30 NOTE — ED PROVIDER NOTE - OBJECTIVE STATEMENT
61M PMHx of chronic pancreatitis s/p pusestow procedure (complicated by chronic pain of which secondary to alcohol abuse), CKD4, hep C s/p SVR and benign esophageal stricture (post-stent 4/19/22 c/b need for CRE balloon dilation, reposition and suture on 4/29) presenting with fall today. Poor historian. Unable to give clear history. Reports being in a store, and then fell. Denies any prodromal symptoms, headaches, neck pain, fevers, chills, chest pain, shortness of breath, abdominal pain, drug use, alcohol use, visual complaints, weakness, subjective neurological deficits. Ambulatory with a walker.

## 2022-08-30 NOTE — ED PROVIDER NOTE - CARE PLAN
1 Principal Discharge DX:	Uremia  Secondary Diagnosis:	AMS (altered mental status)  Secondary Diagnosis:	Fall  Secondary Diagnosis:	Unable to walk  Secondary Diagnosis:	YUNIEL (acute kidney injury)

## 2022-08-31 DIAGNOSIS — N18.9 CHRONIC KIDNEY DISEASE, UNSPECIFIED: ICD-10-CM

## 2022-08-31 DIAGNOSIS — K86.1 OTHER CHRONIC PANCREATITIS: ICD-10-CM

## 2022-08-31 DIAGNOSIS — A41.9 SEPSIS, UNSPECIFIED ORGANISM: ICD-10-CM
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
## 2022-08-31 DIAGNOSIS — R55 SYNCOPE AND COLLAPSE: ICD-10-CM

## 2022-08-31 DIAGNOSIS — E87.5 HYPERKALEMIA: ICD-10-CM

## 2022-08-31 LAB
AMMONIA BLD-MCNC: <10 UMOL/L — SIGNIFICANT CHANGE UP (ref 11–32)
APPEARANCE UR: CLEAR — SIGNIFICANT CHANGE UP
BACTERIA # UR AUTO: ABNORMAL
BILIRUB UR-MCNC: NEGATIVE — SIGNIFICANT CHANGE UP
COLOR SPEC: YELLOW — SIGNIFICANT CHANGE UP
DIFF PNL FLD: ABNORMAL
EPI CELLS # UR: SIGNIFICANT CHANGE UP
FLUAV AG NPH QL: SIGNIFICANT CHANGE UP
FLUBV AG NPH QL: SIGNIFICANT CHANGE UP
GLUCOSE UR QL: NEGATIVE MG/DL — SIGNIFICANT CHANGE UP
KETONES UR-MCNC: NEGATIVE — SIGNIFICANT CHANGE UP
LEUKOCYTE ESTERASE UR-ACNC: ABNORMAL
NITRITE UR-MCNC: NEGATIVE — SIGNIFICANT CHANGE UP
NT-PROBNP SERPL-SCNC: 1217 PG/ML — HIGH (ref 0–125)
PH UR: 6 — SIGNIFICANT CHANGE UP (ref 5–8)
PROCALCITONIN SERPL-MCNC: 0.44 NG/ML — HIGH (ref 0.02–0.1)
PROT UR-MCNC: 30 MG/DL
RBC CASTS # UR COMP ASSIST: ABNORMAL /HPF (ref 0–4)
SARS-COV-2 RNA SPEC QL NAA+PROBE: SIGNIFICANT CHANGE UP
SP GR SPEC: 1.01 — SIGNIFICANT CHANGE UP (ref 1.01–1.02)
TROPONIN I, HIGH SENSITIVITY RESULT: 25.4 NG/L — SIGNIFICANT CHANGE UP
UROBILINOGEN FLD QL: NEGATIVE MG/DL — SIGNIFICANT CHANGE UP
WBC UR QL: ABNORMAL

## 2022-08-31 PROCEDURE — 99253 IP/OBS CNSLTJ NEW/EST LOW 45: CPT

## 2022-08-31 PROCEDURE — 99223 1ST HOSP IP/OBS HIGH 75: CPT | Mod: 25

## 2022-08-31 PROCEDURE — 99221 1ST HOSP IP/OBS SF/LOW 40: CPT

## 2022-08-31 PROCEDURE — 99282 EMERGENCY DEPT VISIT SF MDM: CPT

## 2022-08-31 RX ORDER — PANTOPRAZOLE SODIUM 20 MG/1
40 TABLET, DELAYED RELEASE ORAL
Refills: 0 | Status: DISCONTINUED | OUTPATIENT
Start: 2022-08-31 | End: 2022-09-05

## 2022-08-31 RX ORDER — THIAMINE MONONITRATE (VIT B1) 100 MG
100 TABLET ORAL DAILY
Refills: 0 | Status: DISCONTINUED | OUTPATIENT
Start: 2022-08-31 | End: 2022-09-05

## 2022-08-31 RX ORDER — SODIUM CHLORIDE 9 MG/ML
1000 INJECTION INTRAMUSCULAR; INTRAVENOUS; SUBCUTANEOUS ONCE
Refills: 0 | Status: COMPLETED | OUTPATIENT
Start: 2022-08-31 | End: 2022-08-31

## 2022-08-31 RX ORDER — OXYCODONE HYDROCHLORIDE 5 MG/1
15 TABLET ORAL EVERY 4 HOURS
Refills: 0 | Status: DISCONTINUED | OUTPATIENT
Start: 2022-08-31 | End: 2022-09-05

## 2022-08-31 RX ORDER — SODIUM CHLORIDE 9 MG/ML
1000 INJECTION, SOLUTION INTRAVENOUS
Refills: 0 | Status: DISCONTINUED | OUTPATIENT
Start: 2022-08-31 | End: 2022-09-02

## 2022-08-31 RX ORDER — POLYETHYLENE GLYCOL 3350 17 G/17G
17 POWDER, FOR SOLUTION ORAL DAILY
Refills: 0 | Status: DISCONTINUED | OUTPATIENT
Start: 2022-08-31 | End: 2022-09-05

## 2022-08-31 RX ORDER — PIPERACILLIN AND TAZOBACTAM 4; .5 G/20ML; G/20ML
3.38 INJECTION, POWDER, LYOPHILIZED, FOR SOLUTION INTRAVENOUS ONCE
Refills: 0 | Status: COMPLETED | OUTPATIENT
Start: 2022-08-31 | End: 2022-08-31

## 2022-08-31 RX ORDER — VANCOMYCIN HCL 1 G
1000 VIAL (EA) INTRAVENOUS ONCE
Refills: 0 | Status: COMPLETED | OUTPATIENT
Start: 2022-08-31 | End: 2022-08-31

## 2022-08-31 RX ORDER — ONDANSETRON 8 MG/1
4 TABLET, FILM COATED ORAL EVERY 8 HOURS
Refills: 0 | Status: DISCONTINUED | OUTPATIENT
Start: 2022-08-31 | End: 2022-09-05

## 2022-08-31 RX ORDER — OXYCODONE HYDROCHLORIDE 5 MG/1
15 TABLET ORAL EVERY 4 HOURS
Refills: 0 | Status: DISCONTINUED | OUTPATIENT
Start: 2022-08-31 | End: 2022-08-31

## 2022-08-31 RX ORDER — LANOLIN ALCOHOL/MO/W.PET/CERES
3 CREAM (GRAM) TOPICAL AT BEDTIME
Refills: 0 | Status: DISCONTINUED | OUTPATIENT
Start: 2022-08-31 | End: 2022-09-05

## 2022-08-31 RX ORDER — CYCLOBENZAPRINE HYDROCHLORIDE 10 MG/1
5 TABLET, FILM COATED ORAL THREE TIMES A DAY
Refills: 0 | Status: DISCONTINUED | OUTPATIENT
Start: 2022-08-31 | End: 2022-09-05

## 2022-08-31 RX ORDER — NIFEDIPINE 30 MG
30 TABLET, EXTENDED RELEASE 24 HR ORAL DAILY
Refills: 0 | Status: DISCONTINUED | OUTPATIENT
Start: 2022-08-31 | End: 2022-09-05

## 2022-08-31 RX ORDER — FOLIC ACID 0.8 MG
1 TABLET ORAL DAILY
Refills: 0 | Status: DISCONTINUED | OUTPATIENT
Start: 2022-08-31 | End: 2022-09-05

## 2022-08-31 RX ORDER — FUROSEMIDE 40 MG
40 TABLET ORAL
Refills: 0 | Status: DISCONTINUED | OUTPATIENT
Start: 2022-08-31 | End: 2022-08-31

## 2022-08-31 RX ADMIN — CYCLOBENZAPRINE HYDROCHLORIDE 5 MILLIGRAM(S): 10 TABLET, FILM COATED ORAL at 16:04

## 2022-08-31 RX ADMIN — CYCLOBENZAPRINE HYDROCHLORIDE 5 MILLIGRAM(S): 10 TABLET, FILM COATED ORAL at 21:48

## 2022-08-31 RX ADMIN — Medication 100 MILLIGRAM(S): at 11:22

## 2022-08-31 RX ADMIN — POLYETHYLENE GLYCOL 3350 17 GRAM(S): 17 POWDER, FOR SOLUTION ORAL at 11:22

## 2022-08-31 RX ADMIN — OXYCODONE HYDROCHLORIDE 15 MILLIGRAM(S): 5 TABLET ORAL at 21:48

## 2022-08-31 RX ADMIN — OXYCODONE HYDROCHLORIDE 15 MILLIGRAM(S): 5 TABLET ORAL at 09:35

## 2022-08-31 RX ADMIN — PIPERACILLIN AND TAZOBACTAM 200 GRAM(S): 4; .5 INJECTION, POWDER, LYOPHILIZED, FOR SOLUTION INTRAVENOUS at 00:58

## 2022-08-31 RX ADMIN — CYCLOBENZAPRINE HYDROCHLORIDE 5 MILLIGRAM(S): 10 TABLET, FILM COATED ORAL at 06:47

## 2022-08-31 RX ADMIN — SODIUM CHLORIDE 1000 MILLILITER(S): 9 INJECTION INTRAMUSCULAR; INTRAVENOUS; SUBCUTANEOUS at 00:57

## 2022-08-31 RX ADMIN — OXYCODONE HYDROCHLORIDE 15 MILLIGRAM(S): 5 TABLET ORAL at 22:48

## 2022-08-31 RX ADMIN — Medication 40 MILLIGRAM(S): at 06:47

## 2022-08-31 RX ADMIN — OXYCODONE HYDROCHLORIDE 15 MILLIGRAM(S): 5 TABLET ORAL at 09:01

## 2022-08-31 RX ADMIN — SODIUM CHLORIDE 100 MILLILITER(S): 9 INJECTION, SOLUTION INTRAVENOUS at 21:47

## 2022-08-31 RX ADMIN — Medication 30 MILLIGRAM(S): at 06:47

## 2022-08-31 RX ADMIN — Medication 30 MILLILITER(S): at 14:57

## 2022-08-31 RX ADMIN — OXYCODONE HYDROCHLORIDE 15 MILLIGRAM(S): 5 TABLET ORAL at 15:10

## 2022-08-31 RX ADMIN — PANTOPRAZOLE SODIUM 40 MILLIGRAM(S): 20 TABLET, DELAYED RELEASE ORAL at 06:48

## 2022-08-31 RX ADMIN — OXYCODONE HYDROCHLORIDE 15 MILLIGRAM(S): 5 TABLET ORAL at 15:45

## 2022-08-31 RX ADMIN — Medication 250 MILLIGRAM(S): at 02:59

## 2022-08-31 RX ADMIN — SODIUM CHLORIDE 1000 MILLILITER(S): 9 INJECTION INTRAMUSCULAR; INTRAVENOUS; SUBCUTANEOUS at 00:58

## 2022-08-31 NOTE — PHYSICAL THERAPY INITIAL EVALUATION ADULT - DIAGNOSIS, PT EVAL
Pt presented with strength deficits in the  UE and LE, poor endurance, unsteady standing and ambulation balance, fall risk,

## 2022-08-31 NOTE — H&P ADULT - HISTORY OF PRESENT ILLNESS
This is a 62 yo M with hx of chronic pancreatitis s/p pusestow procedure (complicated by chronic pain of which secondary to alcohol abuse), CKD4, hep C s/p SVR and benign esophageal stricture (post-stent 4/19/22 c/b need for CRE balloon dilation, reposition and suture on 4/29) presenting s/p fall in a store today. Unclear whether patient lost consciousness as he is a poor historian. Denies prodromal dizziness, cp, sob, palpitations or diaphoresis. ambulates with a walker. denies headache neck pain, fevers, chills, chest pain, shortness of breath, abdominal pain, drug use, alcohol use, visual complaints, weakness, subjective neurological deficits. labs significant for wbc 10.7 hgb 10 normocytic, k 5.8, bun/creat 108/3.92 (2.22 a month ago) prnp 1217. TTE 7/14/22 normal ef, mild/mod AI. ct brain no acute pathology, ct abd chest, spine, head no acute pathology. given vanco/zosyn and 2L ns in ed

## 2022-08-31 NOTE — PROGRESS NOTE ADULT - ASSESSMENT
Patient is a 61y old  Male who presents with a chief complaint of   INTERVAL HPI/OVERNIGHT EVENTS: no acute events     MEDICATIONS  (STANDING):  cyclobenzaprine 5 milliGRAM(s) Oral three times a day  folic acid 1 milliGRAM(s) Oral daily  furosemide    Tablet 40 milliGRAM(s) Oral two times a day  NIFEdipine XL 30 milliGRAM(s) Oral daily  pantoprazole    Tablet 40 milliGRAM(s) Oral before breakfast  polyethylene glycol 3350 17 Gram(s) Oral daily  thiamine 100 milliGRAM(s) Oral daily    MEDICATIONS  (PRN):  aluminum hydroxide/magnesium hydroxide/simethicone Suspension 30 milliLiter(s) Oral every 4 hours PRN Dyspepsia  melatonin 3 milliGRAM(s) Oral at bedtime PRN Insomnia  ondansetron Injectable 4 milliGRAM(s) IV Push every 8 hours PRN Nausea and/or Vomiting  oxyCODONE    IR 15 milliGRAM(s) Oral every 4 hours PRN Severe Pain (7 - 10)    Allergies    Tylenol (Unknown)    Intolerances      REVIEW OF SYSTEMS:  All other systems reviewed and are negative    Vital Signs Last 24 Hrs  T(C): 36.9 (31 Aug 2022 11:55), Max: 36.9 (31 Aug 2022 11:55)  T(F): 98.5 (31 Aug 2022 11:55), Max: 98.5 (31 Aug 2022 11:55)  HR: 89 (31 Aug 2022 11:55) (75 - 89)  BP: 111/70 (31 Aug 2022 11:55) (111/70 - 158/97)  BP(mean): --  RR: 18 (31 Aug 2022 11:55) (17 - 189)  SpO2: 99% (31 Aug 2022 11:55) (96% - 100%)    Parameters below as of 31 Aug 2022 11:55  Patient On (Oxygen Delivery Method): room air      Daily Height in cm: 185.42 (30 Aug 2022 20:16)    Daily Weight in k.3 (31 Aug 2022 04:32)  I&O's Summary    CAPILLARY BLOOD GLUCOSE        PHYSICAL EXAM:  GENERAL: NAD, well-groomed, well-developed. NAD. pleasant  HEAD:  Atraumatic, Normocephalic  EYES: EOMI, PERRLA, conjunctiva and sclera clear  ENMT: No tonsillar erythema, exudates, or enlargement; Moist mucous membranes, Good dentition, No lesions  NECK: Supple, No JVD, Normal thyroid  NERVOUS SYSTEM:  Alert & Oriented X3, Good concentration  CHEST/LUNG: Clear to percussion bilaterally; No rales, rhonchi, wheezing, or rubs  HEART: Regular rate and rhythm; no abnormal heart sounds   ABDOMEN: Soft, Nontender, Nondistended; Bowel sounds present  EXTREMITIES:  2+ Peripheral Pulses, No clubbing, cyanosis, or edema  LYMPH: No lymphadenopathy noted  SKIN: No rashes or lesions    Labs                          10.7   10.76 )-----------( 270      ( 30 Aug 2022 22:10 )             33.3     08-30    138  |  112<H>  |  108<H>  ----------------------------<  108<H>  5.8<H>   |  19<L>  |  3.92<H>    Ca    8.4<L>      30 Aug 2022 22:10    TPro  6.7  /  Alb  3.1<L>  /  TBili  0.1<L>  /  DBili  x   /  AST  44<H>  /  ALT  41  /  AlkPhos  88  08-30    PT/INR - ( 30 Aug 2022 22:10 )   PT: 11.1 sec;   INR: 0.93 ratio         PTT - ( 30 Aug 2022 22:10 )  PTT:26.4 sec      Urinalysis Basic - ( 31 Aug 2022 02:00 )    Color: Yellow / Appearance: Clear / S.015 / pH: x  Gluc: x / Ketone: Negative  / Bili: Negative / Urobili: Negative mg/dL   Blood: x / Protein: 30 mg/dL / Nitrite: Negative   Leuk Esterase: Trace / RBC: 3-5 /HPF / WBC 6-10   Sq Epi: x / Non Sq Epi: Few / Bacteria: Few    62 yo male w/ pmhx of chronic pancreatitis s/p pusestow procedure (complicated by chronic pain of which secondary to alcohol abuse), CKD4, hep C s/p SVR and benign esophageal stricture (post-stent 22 c/b need for CRE balloon dilation, reposition and suture on ) admitted to [facility] for mechnical fall secondary to presumptive syncope, likely secondary to dehydration from [facility]    GENERAL MEDICINE  # mechanical fall  # syncope???  - trauma work up (CT Brain/facial/chest/abd/pelvis)- negative  - possibly secondary to syncope from dehydration    NEPHROLOGY  # fatou  - likely secondary to dehydration    - unclear if pt fainted. pt is a poor historian  - will have to assume syncope    PLAN  - transfer to TELE  - consult CARDIO/ NEUROLOGY  - PT  - gentle hydration  - hold lasix for now  - orthostatics                DVT prophylaxis: > Lovenox 40mg SQ daily  > Heparin drip  > SCD's Patient is a 61y old  Male who presents with a chief complaint of   INTERVAL HPI/OVERNIGHT EVENTS: no acute events     MEDICATIONS  (STANDING):  cyclobenzaprine 5 milliGRAM(s) Oral three times a day  folic acid 1 milliGRAM(s) Oral daily  furosemide    Tablet 40 milliGRAM(s) Oral two times a day  NIFEdipine XL 30 milliGRAM(s) Oral daily  pantoprazole    Tablet 40 milliGRAM(s) Oral before breakfast  polyethylene glycol 3350 17 Gram(s) Oral daily  thiamine 100 milliGRAM(s) Oral daily    MEDICATIONS  (PRN):  aluminum hydroxide/magnesium hydroxide/simethicone Suspension 30 milliLiter(s) Oral every 4 hours PRN Dyspepsia  melatonin 3 milliGRAM(s) Oral at bedtime PRN Insomnia  ondansetron Injectable 4 milliGRAM(s) IV Push every 8 hours PRN Nausea and/or Vomiting  oxyCODONE    IR 15 milliGRAM(s) Oral every 4 hours PRN Severe Pain (7 - 10)    Allergies    Tylenol (Unknown)    Intolerances      REVIEW OF SYSTEMS:  All other systems reviewed and are negative    Vital Signs Last 24 Hrs  T(C): 36.9 (31 Aug 2022 11:55), Max: 36.9 (31 Aug 2022 11:55)  T(F): 98.5 (31 Aug 2022 11:55), Max: 98.5 (31 Aug 2022 11:55)  HR: 89 (31 Aug 2022 11:55) (75 - 89)  BP: 111/70 (31 Aug 2022 11:55) (111/70 - 158/97)  BP(mean): --  RR: 18 (31 Aug 2022 11:55) (17 - 189)  SpO2: 99% (31 Aug 2022 11:55) (96% - 100%)    Parameters below as of 31 Aug 2022 11:55  Patient On (Oxygen Delivery Method): room air      Daily Height in cm: 185.42 (30 Aug 2022 20:16)    Daily Weight in k.3 (31 Aug 2022 04:32)  I&O's Summary    CAPILLARY BLOOD GLUCOSE        PHYSICAL EXAM:  GENERAL: NAD, well-groomed, well-developed. NAD. pleasant  HEAD:  Atraumatic, Normocephalic  EYES: EOMI, PERRLA, conjunctiva and sclera clear  ENMT: No tonsillar erythema, exudates, or enlargement; Moist mucous membranes, Good dentition, No lesions  NECK: Supple, No JVD, Normal thyroid  NERVOUS SYSTEM:  Alert & Oriented X3, Good concentration  CHEST/LUNG: Clear to percussion bilaterally; No rales, rhonchi, wheezing, or rubs  HEART: Regular rate and rhythm; no abnormal heart sounds   ABDOMEN: Soft, Nontender, Nondistended; Bowel sounds present  EXTREMITIES:  2+ Peripheral Pulses, No clubbing, cyanosis, or edema  LYMPH: No lymphadenopathy noted  SKIN: No rashes or lesions    Labs                          10.7   10.76 )-----------( 270      ( 30 Aug 2022 22:10 )             33.3     08-30    138  |  112<H>  |  108<H>  ----------------------------<  108<H>  5.8<H>   |  19<L>  |  3.92<H>    Ca    8.4<L>      30 Aug 2022 22:10    TPro  6.7  /  Alb  3.1<L>  /  TBili  0.1<L>  /  DBili  x   /  AST  44<H>  /  ALT  41  /  AlkPhos  88  08-30    PT/INR - ( 30 Aug 2022 22:10 )   PT: 11.1 sec;   INR: 0.93 ratio         PTT - ( 30 Aug 2022 22:10 )  PTT:26.4 sec      Urinalysis Basic - ( 31 Aug 2022 02:00 )    Color: Yellow / Appearance: Clear / S.015 / pH: x  Gluc: x / Ketone: Negative  / Bili: Negative / Urobili: Negative mg/dL   Blood: x / Protein: 30 mg/dL / Nitrite: Negative   Leuk Esterase: Trace / RBC: 3-5 /HPF / WBC 6-10   Sq Epi: x / Non Sq Epi: Few / Bacteria: Few    60 yo male w/ pmhx of chronic pancreatitis s/p pusestow procedure (complicated by chronic pain of which secondary to alcohol abuse), CKD4, hep C s/p SVR and benign esophageal stricture (post-stent 22 c/b need for CRE balloon dilation, reposition and suture on ) admitted to [facility] for mechnical fall secondary to presumptive syncope, likely secondary to dehydration from [facility]    GENERAL MEDICINE  # mechanical fall  # syncope???  - trauma work up (CT Brain/facial/chest/abd/pelvis)- negative  - possibly secondary to syncope from dehydration    NEPHROLOGY  # fatou on CKD Stage 3b?  - likely secondary to dehydration  - creatinine 2.22 a month ago, now 3.92    - unclear if pt fainted. pt is a poor historian  - will have to assume syncope    PLAN  - transfer to TELE  - consult CARDIO/ NEUROLOGY  - PT  - gentle hydration  - hold lasix for now  - orthostatics                DVT prophylaxis: > Lovenox 40mg SQ daily  > Heparin drip  > SCD's Patient is a 61y old  Male who presents with a chief complaint of   INTERVAL HPI/OVERNIGHT EVENTS: no acute events     MEDICATIONS  (STANDING):  cyclobenzaprine 5 milliGRAM(s) Oral three times a day  folic acid 1 milliGRAM(s) Oral daily  furosemide    Tablet 40 milliGRAM(s) Oral two times a day  NIFEdipine XL 30 milliGRAM(s) Oral daily  pantoprazole    Tablet 40 milliGRAM(s) Oral before breakfast  polyethylene glycol 3350 17 Gram(s) Oral daily  thiamine 100 milliGRAM(s) Oral daily    MEDICATIONS  (PRN):  aluminum hydroxide/magnesium hydroxide/simethicone Suspension 30 milliLiter(s) Oral every 4 hours PRN Dyspepsia  melatonin 3 milliGRAM(s) Oral at bedtime PRN Insomnia  ondansetron Injectable 4 milliGRAM(s) IV Push every 8 hours PRN Nausea and/or Vomiting  oxyCODONE    IR 15 milliGRAM(s) Oral every 4 hours PRN Severe Pain (7 - 10)    Allergies    Tylenol (Unknown)    Intolerances      REVIEW OF SYSTEMS:  All other systems reviewed and are negative    Vital Signs Last 24 Hrs  T(C): 36.9 (31 Aug 2022 11:55), Max: 36.9 (31 Aug 2022 11:55)  T(F): 98.5 (31 Aug 2022 11:55), Max: 98.5 (31 Aug 2022 11:55)  HR: 89 (31 Aug 2022 11:55) (75 - 89)  BP: 111/70 (31 Aug 2022 11:55) (111/70 - 158/97)  BP(mean): --  RR: 18 (31 Aug 2022 11:55) (17 - 189)  SpO2: 99% (31 Aug 2022 11:55) (96% - 100%)    Parameters below as of 31 Aug 2022 11:55  Patient On (Oxygen Delivery Method): room air      Daily Height in cm: 185.42 (30 Aug 2022 20:16)    Daily Weight in k.3 (31 Aug 2022 04:32)  I&O's Summary    CAPILLARY BLOOD GLUCOSE        PHYSICAL EXAM:  GENERAL: NAD, well-groomed, well-developed. NAD. pleasant  HEAD:  Atraumatic, Normocephalic  EYES: EOMI, PERRLA, conjunctiva and sclera clear  ENMT: No tonsillar erythema, exudates, or enlargement; Moist mucous membranes, Good dentition, No lesions  NECK: Supple, No JVD, Normal thyroid  NERVOUS SYSTEM:  Alert & Oriented X3, Good concentration  CHEST/LUNG: Clear to percussion bilaterally; No rales, rhonchi, wheezing, or rubs  HEART: Regular rate and rhythm; no abnormal heart sounds   ABDOMEN: Soft, Nontender, Nondistended; Bowel sounds present  EXTREMITIES:  2+ Peripheral Pulses, No clubbing, cyanosis, or edema  LYMPH: No lymphadenopathy noted  SKIN: No rashes or lesions    Labs                          10.7   10.76 )-----------( 270      ( 30 Aug 2022 22:10 )             33.3     08-30    138  |  112<H>  |  108<H>  ----------------------------<  108<H>  5.8<H>   |  19<L>  |  3.92<H>    Ca    8.4<L>      30 Aug 2022 22:10    TPro  6.7  /  Alb  3.1<L>  /  TBili  0.1<L>  /  DBili  x   /  AST  44<H>  /  ALT  41  /  AlkPhos  88  08-30    PT/INR - ( 30 Aug 2022 22:10 )   PT: 11.1 sec;   INR: 0.93 ratio         PTT - ( 30 Aug 2022 22:10 )  PTT:26.4 sec      Urinalysis Basic - ( 31 Aug 2022 02:00 )    Color: Yellow / Appearance: Clear / S.015 / pH: x  Gluc: x / Ketone: Negative  / Bili: Negative / Urobili: Negative mg/dL   Blood: x / Protein: 30 mg/dL / Nitrite: Negative   Leuk Esterase: Trace / RBC: 3-5 /HPF / WBC 6-10   Sq Epi: x / Non Sq Epi: Few / Bacteria: Few    60 yo male w/ pmhx of chronic pancreatitis s/p pusestow procedure (complicated by chronic pain of which secondary to alcohol abuse), CKD4, hep C s/p SVR and benign esophageal stricture (post-stent 22 c/b need for CRE balloon dilation, reposition and suture on ) admitted to [facility] for mechnical fall secondary to presumptive syncope, likely secondary to dehydration from [facility]    GENERAL MEDICINE  # mechanical fall  # syncope???  - trauma work up (CT Brain/facial/chest/abd/pelvis)- negative  - possibly secondary to syncope from dehydration  - unclear if pt fainted. pt is a poor historian  - will have to assume syncope    NEPHROLOGY  # fatou on CKD Stage 3b?  - likely secondary to dehydration  - creatinine 2.22 a month ago, now 3.92    INFECTIOUS DISEASE  # leukocytosis  - unclear etiology  - c/w empiric abx for now    PLAN  - transfer to TELE  - consult CARDIO/ NEUROLOGY  - PT  - gentle hydration  - hold lasix for now  - orthostatics                DVT prophylaxis: > Lovenox 40mg SQ daily  > Heparin drip  > SCD's

## 2022-08-31 NOTE — PHYSICAL THERAPY INITIAL EVALUATION ADULT - LIVES WITH, PROFILE
Pt states he lives in a pvt house with mother, at the entrance 4 steps with wide rails, once inside does not have any steps to use, stays on main floor with all amenities

## 2022-08-31 NOTE — PHYSICAL THERAPY INITIAL EVALUATION ADULT - PRECAUTIONS/LIMITATIONS, REHAB EVAL
presents with strength deficits in the UE AN and LE, unsteady standing and ambulation balance, fall risk.

## 2022-08-31 NOTE — H&P ADULT - NSHPPHYSICALEXAM_GEN_ALL_CORE
Constitutional: NAD AAO   HEENT PERRLA EOMI  CV RRR S1S2  Pulm CTA b/l   GI soft nontender nondistended + BS   Neuro CN II-XII grossly intact   Extremities no edema or calf tenderness

## 2022-08-31 NOTE — CONSULT NOTE ADULT - SUBJECTIVE AND OBJECTIVE BOX
Patient chart reviewed, full consult to follow.     Known to me from my office.    Thank you for the courtesy of this consultation. [facility] NEPHROLOGY [facility], [facility]  NEPHROLOGY AND HYPERTENSION  [address]  [address]  [place] [address]  [phone]    MD HALIE OLIVIA MD ANDREY GONCHARUK, MD MADHU KORRAPATI, MD YELENA ROSENBERG, MD BINNY KOSHY, MD CHRISTOPHER CAPUTO, MD EDWARD BOVER, MD      Information from chart:  "Patient is a 61y old  Male who presents with a chief complaint of   HPI:  This is a 62 yo M with hx of chronic pancreatitis s/p pusestow procedure (complicated by chronic pain of which secondary to alcohol abuse), CKD4, hep C s/p SVR and benign esophageal stricture (post-stent 22 c/b need for CRE balloon dilation, reposition and suture on ) presenting s/p fall in a store today. Unclear whether patient lost consciousness as he is a poor historian. Denies prodromal dizziness, cp, sob, palpitations or diaphoresis. ambulates with a walker. denies headache neck pain, fevers, chills, chest pain, shortness of breath, abdominal pain, drug use, alcohol use, visual complaints, weakness, subjective neurological deficits. labs significant for wbc 10.7 hgb 10 normocytic, k 5.8, bun/creat 108/3.92 (2.22 a month ago) prnp 1217. TTE 22 normal ef, mild/mod AI. ct brain no acute pathology, ct abd chest, spine, head no acute pathology. given vanco/zosyn and 2L ns in ed (31 Aug 2022 03:43)   "          Patient in no distress    PAST MEDICAL & SURGICAL HISTORY:  ETOH abuse  sober x1 year approximately      Pancreatitis      Hepatitis C  treated      Gout      HTN (hypertension)      Chronic kidney disease (CKD)      H/O chronic pancreatitis      Gout      Alcohol abuse      Gastric perforation        FAMILY HISTORY:  FH: HTN (hypertension)      Allergies    Tylenol (Unknown)    Intolerances      Home Medications:  acetaminophen 325 mg oral tablet: 2 tab(s) orally every 6 hours (2022 15:50)  bisacodyl 5 mg oral delayed release tablet: 1 tab(s) orally once a day (at bedtime) (2022 17:55)  MiraLax oral powder for reconstitution: 17 gram(s) orally 2 times a day (2022 17:55)  thiamine 100 mg oral tablet: 1 tab(s) orally once a day (2022 17:55)    MEDICATIONS  (STANDING):  cyclobenzaprine 5 milliGRAM(s) Oral three times a day  folic acid 1 milliGRAM(s) Oral daily  NIFEdipine XL 30 milliGRAM(s) Oral daily  pantoprazole    Tablet 40 milliGRAM(s) Oral before breakfast  polyethylene glycol 3350 17 Gram(s) Oral daily  thiamine 100 milliGRAM(s) Oral daily    MEDICATIONS  (PRN):  aluminum hydroxide/magnesium hydroxide/simethicone Suspension 30 milliLiter(s) Oral every 4 hours PRN Dyspepsia  melatonin 3 milliGRAM(s) Oral at bedtime PRN Insomnia  ondansetron Injectable 4 milliGRAM(s) IV Push every 8 hours PRN Nausea and/or Vomiting  oxyCODONE    IR 15 milliGRAM(s) Oral every 4 hours PRN Severe Pain (7 - 10)    Vital Signs Last 24 Hrs  T(C): 36.7 (31 Aug 2022 17:30), Max: 37 (31 Aug 2022 15:30)  T(F): 98 (31 Aug 2022 17:30), Max: 98.6 (31 Aug 2022 15:30)  HR: 87 (31 Aug 2022 17:30) (75 - 89)  BP: 147/81 (31 Aug 2022 17:30) (111/70 - 158/97)  BP(mean): --  RR: 18 (31 Aug 2022 17:30) (17 - 189)  SpO2: 98% (31 Aug 2022 17:30) (96% - 99%)    Parameters below as of 31 Aug 2022 17:30  Patient On (Oxygen Delivery Method): room air        Daily     Daily Weight in k.3 (31 Aug 2022 04:32)    CAPILLARY BLOOD GLUCOSE        PHYSICAL EXAM:      T(C): 36.7 (22 @ 17:30), Max: 37 (22 @ 15:30)  HR: 87 (22 @ 17:30) (75 - 89)  BP: 147/81 (22 @ 17:30) (111/70 - 158/97)  RR: 18 (22 @ 17:30) (17 - 189)  SpO2: 98% (22 @ 17:30) (96% - 99%)  Wt(kg): --  Lungs clear  Heart S1S2  Abd soft NT ND  Extremities:   tr edema                  138  |  112<H>  |  108<H>  ----------------------------<  108<H>  5.8<H>   |  19<L>  |  3.92<H>    Ca    8.4<L>      30 Aug 2022 22:10    TPro  6.7  /  Alb  3.1<L>  /  TBili  0.1<L>  /  DBili  x   /  AST  44<H>  /  ALT  41  /  AlkPhos  88                            10.7   10.76 )-----------( 270      ( 30 Aug 2022 22:10 )             33.3     Creatinine Trend: 3.92<--  Urinalysis Basic - ( 31 Aug 2022 02:00 )    Color: Yellow / Appearance: Clear / S.015 / pH: x  Gluc: x / Ketone: Negative  / Bili: Negative / Urobili: Negative mg/dL   Blood: x / Protein: 30 mg/dL / Nitrite: Negative   Leuk Esterase: Trace / RBC: 3-5 /HPF / WBC 6-10   Sq Epi: x / Non Sq Epi: Few / Bacteria: Few      Trend Bun/Cr  22 @ 22:10  BUN/CR -  108<H> / 3.92<H>  22 @ 06:00  BUN/CR -  36<H> / 2.22<H>  22 @ 07:21  BUN/CR -  38<H> / 2.21<H>  22 @ 10:10  BUN/CR -  36<H> / 2.23<H>  22 @ 23:00  BUN/CR -  36<H> / 2.21<H>  22 @ 06:47  BUN/CR -  42<H> / 2.28<H>  22 @ 00:35  BUN/CR -  41<H> / 2.31<H>  22 @ 16:05  BUN/CR -  42<H> / 2.34<H>  22 @ 06:15  BUN/CR -  43<H> / 2.27<H>  22 @ 03:07  BUN/CR -  43<H> / 2.20<H>  22 @ 18:03  BUN/CR -  46<H> / 2.21<H>  22 @ 06:15  BUN/CR -  51<H> / 2.35<H>        Assessment   YUNIEL CKD 3-4; suspected pre renal azotemia     Plan  IVF challenge until po adequate;   Will follow trend    Austin Dukes MD          Thank you for the courtesy of this consultation.

## 2022-08-31 NOTE — PHYSICAL THERAPY INITIAL EVALUATION ADULT - PERTINENT HX OF CURRENT PROBLEM, REHAB EVAL
Pt with h/o Chronic pancreatitis, Etoh abuse, CKD, Hep C, s/p SVR and benigh  esophageal stricture, s/p recent fall outside the house as per patient  and brought to Ed by EMS, presently c/o mod to severe abdominal pain and generalized weakness/ easy fatigue.

## 2022-08-31 NOTE — H&P ADULT - ASSESSMENT
This is a 60 yo M with hx of chronic pancreatitis s/p pusestow procedure (complicated by chronic pain of which secondary to alcohol abuse), CKD4, hep C s/p SVR and benign esophageal stricture (post-stent 4/19/22 c/b need for CRE balloon dilation, reposition and suture on 4/29) presenting s/p fall in a store today. Unclear whether patient lost consciousness as he is a poor historian. Denies prodromal dizziness, cp, sob, palpitations or diaphoresis. ambulates with a walker. denies headache neck pain, fevers, chills, chest pain, shortness of breath, abdominal pain, drug use, alcohol use, visual complaints, weakness, subjective neurological deficits. labs significant for wbc 10.7 hgb 10 normocytic, k 5.8, bun/creat 108/3.92 (2.22 a month ago) prnp 1217. TTE 7/14/22 normal ef, mild/mod AI. given vanco/zosyn in ed    fall  possible syncope  fatou on ckd  sepsis   hyperkalemia   chronic pancreatitits  hep c  esophageal stricture  -recent tte reviewed. f/u carotrid duplex  -telemetry  -replete lytes prn. no ekg changes   -s/p broad spectrum abx in ed. f/u cultures. no apparent infection source, slight wbc elevation ay be reactive. observe w/o abx. check procal   -appears euvolemic; s/p ivf in ed. may have been dry initially  -resume home lasix  -resume other home meds  -scds, regular diet  -PT

## 2022-08-31 NOTE — PHYSICAL THERAPY INITIAL EVALUATION ADULT - STRENGTHENING, PT EVAL
Improve strength in the UE and LE to 5/5, improve endurance to good and be able to perform functional tasks-bed mobility, sitting, standing, transfers and ambulate in a safe manner with or without  assistive device and prevent falls.

## 2022-08-31 NOTE — CONSULT NOTE ADULT - SUBJECTIVE AND OBJECTIVE BOX
Cardiology Initial Consult    [facility] - Cardiology at [place]  [address] [place] [address], [place] [address]  Office: [phone]  Fax: [phone]    CHIEF COMPLAINT:      HISTORY OF PRESENT ILLNESS:  61M HTN, chronic pancreatitis, hx etoh abuse, CKD, esophageal stricture who initially presented with a fall.  Pt is poor historian and unclear if there was LOC. No clear prodrome.      Allergies  Tylenol (Unknown)  Intolerances      MEDICATIONS:  NIFEdipine XL 30 milliGRAM(s) Oral daily  cyclobenzaprine 5 milliGRAM(s) Oral three times a day  melatonin 3 milliGRAM(s) Oral at bedtime PRN  ondansetron Injectable 4 milliGRAM(s) IV Push every 8 hours PRN  oxyCODONE    IR 15 milliGRAM(s) Oral every 4 hours PRN  aluminum hydroxide/magnesium hydroxide/simethicone Suspension 30 milliLiter(s) Oral every 4 hours PRN  pantoprazole    Tablet 40 milliGRAM(s) Oral before breakfast  polyethylene glycol 3350 17 Gram(s) Oral daily  folic acid 1 milliGRAM(s) Oral daily  thiamine 100 milliGRAM(s) Oral daily    PAST MEDICAL & SURGICAL HISTORY:  ETOH abuse  sober x1 year approximately  Pancreatitis  Hepatitis C  treated  Gout  HTN (hypertension)  Chronic kidney disease (CKD)  H/O chronic pancreatitis  Gout  Alcohol abuse  Gastric perforation    FAMILY HISTORY:  FH: HTN (hypertension)        SOCIAL HISTORY:    [ ] Non-smoker  [ ] Smoker  [ ] Alcohol    Review of Systems:  Constitutional: [ ] Fever [ ] Chills [ ] Fatigue [ ] Weight change   HEENT: [ ] Blurred vision [ ] Eye pain [ ] Headache [ ] Runny nose [ ] Sore throat   Respiratory: [ ] Cough [ ] Wheezing [ ] Shortness of breath  Cardiovascular: [ ] Chest Pain [ ] Palpitations [ ] SHAH [ ] PND [ ] Orthopnea  Gastrointestinal: [ ] Abdominal Pain [ ] Diarrhea [ ] Constipation [ ] Hemorrhoids [ ] Nausea [ ] Vomiting  Genitourinary: [ ] Nocturia [ ] Dysuria [ ] Incontinence  Extremities: [ ] Swelling [ ] Joint Pain  Neurologic: [ ] Focal deficit [ ] Paresthesias [ ] Syncope  Skin: [ ] Rash [ ] Ecchymoses [ ] Wounds [ ] Lesions  Psychiatry: [ ] Depression [ ] Suicidal/Homicidal ideation [ ] Anxiety [ ] Sleep disturbances  [ ] 10 point review of systems is otherwise negative except as mentioned above            [ ]Unable to obtain    PHYSICAL EXAM:  T(C): 37 (08-31-22 @ 15:30), Max: 37 (08-31-22 @ 15:30)  HR: 83 (08-31-22 @ 15:30) (75 - 89)  BP: 124/73 (08-31-22 @ 15:30) (111/70 - 158/97)  RR: 18 (08-31-22 @ 15:30) (17 - 189)  SpO2: 97% (08-31-22 @ 15:30) (96% - 100%)  Wt(kg): --  I&O's Summary      Appearance: No acute distress  HEENT:   Normal oral mucosa, PERRL  Cardiovascular: Normal S1 S2, no elevated JVP, no murmurs, no edema  Respiratory: Lungs clear to auscultation	, good air movement  Psychiatry: A & O x 3, Mood & affect appropriate  Gastrointestinal:  soft nt nd normoactive bs	  Skin: No rashes, no ecchymoses, no cyanosis	  Neurologic: grossly non-focal  Extremities: Normal range of motion, no clubbing, cyanosis or edema  Vascular: Peripheral pulses palpable bilaterally    LABS:	 	  CBC Full  -  ( 30 Aug 2022 22:10 )  WBC Count : 10.76 K/uL  Hemoglobin : 10.7 g/dL  Hematocrit : 33.3 %  Platelet Count - Automated : 270 K/uL  Mean Cell Volume : 96.2 fl  Mean Cell Hemoglobin : 30.9 pg  Mean Cell Hemoglobin Concentration : 32.1 g/dL  Auto Neutrophil # : 8.92 K/uL  Auto Lymphocyte # : 1.18 K/uL  Auto Monocyte # : 0.58 K/uL  Auto Eosinophil # : 0.00 K/uL  Auto Basophil # : 0.03 K/uL  Auto Neutrophil % : 82.8 %  Auto Lymphocyte % : 11.0 %  Auto Monocyte % : 5.4 %  Auto Eosinophil % : 0.0 %  Auto Basophil % : 0.3 %    08-30    138  |  112<H>  |  108<H>  ----------------------------<  108<H>  5.8<H>   |  19<L>  |  3.92<H>    Ca    8.4<L>      30 Aug 2022 22:10    TPro  6.7  /  Alb  3.1<L>  /  TBili  0.1<L>  /  DBili  x   /  AST  44<H>  /  ALT  41  /  AlkPhos  88  08-30      proBNP: Serum Pro-Brain Natriuretic Peptide: 1217 pg/mL (08-30 @ 22:10)    Lipid Profile:   HgA1c:   TSH:     CARDIAC MARKERS:            Troponin I, High Sensitivity Result: 25.4 ng/L (08-30-22 @ 22:10)      TELEMETRY: 	    ECG:  	  RADIOLOGY:  OTHER: 	    PREVIOUS DIAGNOSTIC TESTING:    [ ] Echocardiogram:   [ ] Catheterization:  [ ] Stress Test:  	 Cardiology Initial Consult    [facility] - Cardiology at [place]  [address] [place] [address], [place] [address]  Office: [phone]  Fax: [phone]    CHIEF COMPLAINT:  fall, possible LOC    HISTORY OF PRESENT ILLNESS:  61M HTN, chronic pancreatitis, hx etoh abuse, CKD, esophageal stricture who initially presented with a fall.  Pt is poor historian and unclear if there was LOC. No clear prodrome.  Denies any current sx      Allergies  Tylenol (Unknown)  Intolerances      MEDICATIONS:  NIFEdipine XL 30 milliGRAM(s) Oral daily  cyclobenzaprine 5 milliGRAM(s) Oral three times a day  melatonin 3 milliGRAM(s) Oral at bedtime PRN  ondansetron Injectable 4 milliGRAM(s) IV Push every 8 hours PRN  oxyCODONE    IR 15 milliGRAM(s) Oral every 4 hours PRN  aluminum hydroxide/magnesium hydroxide/simethicone Suspension 30 milliLiter(s) Oral every 4 hours PRN  pantoprazole    Tablet 40 milliGRAM(s) Oral before breakfast  polyethylene glycol 3350 17 Gram(s) Oral daily  folic acid 1 milliGRAM(s) Oral daily  thiamine 100 milliGRAM(s) Oral daily    PAST MEDICAL & SURGICAL HISTORY:  ETOH abuse  sober x1 year approximately  Pancreatitis  Hepatitis C  treated  Gout  HTN (hypertension)  Chronic kidney disease (CKD)  H/O chronic pancreatitis  Gout  Alcohol abuse  Gastric perforation    FAMILY HISTORY:  FH: HTN (hypertension)    SOCIAL HISTORY:    [ ] Non-smoker  [ ] Smoker  [ ] Alcohol    Review of Systems:  Constitutional: [ -] Fever [- ] Chills [ ] Fatigue [ ] Weight change   HEENT: [ ] Blurred vision [- ] Eye pain [ ] Headache [ ] Runny nose [ ] Sore throat   Respiratory: [ ] Cough [- ] Wheezing [ ] Shortness of breath  Cardiovascular: [- ] Chest Pain [ -] Palpitations [- ] SHAH [ -] PND [ ] Orthopnea  Gastrointestinal: [- ] Abdominal Pain [ ] Diarrhea [ ] Constipation [ ] Hemorrhoids [ ] Nausea [ ] Vomiting  Genitourinary: [ ] Nocturia [ -Dysuria [ ] Incontinence  Extremities: [ ] Swelling [ -] Joint Pain  Neurologic: [ ] Focal deficit [ ] Paresthesias [? ] Syncope  Skin: [- ] Rash [ ] Ecchymoses [ ] Wounds [ ] Lesions  Psychiatry: [-] Depression [ ] Suicidal/Homicidal ideation [ ] Anxiety [ ] Sleep disturbances  [x ] 10 point review of systems is otherwise negative except as mentioned above            [ ]Unable to obtain    PHYSICAL EXAM:  T(C): 37 (08-31-22 @ 15:30), Max: 37 (08-31-22 @ 15:30)  HR: 83 (08-31-22 @ 15:30) (75 - 89)  BP: 124/73 (08-31-22 @ 15:30) (111/70 - 158/97)  RR: 18 (08-31-22 @ 15:30) (17 - 189)  SpO2: 97% (08-31-22 @ 15:30) (96% - 100%)  Wt(kg): --  I&O's Summary    Appearance: No acute distress  HEENT:   Normal oral mucosa, PERRL  Cardiovascular: Normal S1 S2, no elevated JVP, no murmurs, no edema  Respiratory: Lungs clear to auscultation, good air movement  Psychiatry:  Mood & affect appropriate  Gastrointestinal:  soft nt   Skin: No rashes, no ecchymoses, no cyanosis	    LABS:	 	  CBC Full  -  ( 30 Aug 2022 22:10 )  WBC Count : 10.76 K/uL  Hemoglobin : 10.7 g/dL  Hematocrit : 33.3 %  Platelet Count - Automated : 270 K/uL    08-30  138  |  112<H>  |  108<H>  ----------------------------<  108<H>  5.8<H>   |  19<L>  |  3.92<H>    Ca    8.4<L>      30 Aug 2022 22:10    TPro  6.7  /  Alb  3.1<L>  /  TBili  0.1<L>  /  DBili  x   /  AST  44<H>  /  ALT  41  /  AlkPhos  88  08-30      proBNP: Serum Pro-Brain Natriuretic Peptide: 1217 pg/mL (08-30 @ 22:10)    Lipid Profile:   HgA1c:   TSH:     CARDIAC MARKERS:  Troponin I, High Sensitivity Result: 25.4 ng/L (08-30-22 @ 22:10)    ECG:  	NSR  RADIOLOGY:  OTHER: 	    PREVIOUS DIAGNOSTIC TESTING:    [x] Echocardiogram: < from: Transthoracic Echocardiogram (07.14.22 @ 11:15) >  1. Mitral annular calcification, otherwise normal mitral  valve. Minimal mitral regurgitation.  2. Calcified trileaflet aortic valve with normal opening.  Mild-moderate aortic regurgitation.  3. Normal left ventricular internal dimensions and wall  thicknesses.  4. Normal left ventricular systolic function. No segmental  wall motion abnormalities.  5. Normal right ventricular size and function.    < end of copied text >    [ ] Catheterization:  [ ] Stress Test:

## 2022-09-01 LAB
A1C WITH ESTIMATED AVERAGE GLUCOSE RESULT: 5.7 % — HIGH (ref 4–5.6)
ALBUMIN SERPL ELPH-MCNC: 2.4 G/DL — LOW (ref 3.3–5)
ALP SERPL-CCNC: 69 U/L — SIGNIFICANT CHANGE UP (ref 40–120)
ALT FLD-CCNC: 29 U/L — SIGNIFICANT CHANGE UP (ref 12–78)
ANION GAP SERPL CALC-SCNC: 8 MMOL/L — SIGNIFICANT CHANGE UP (ref 5–17)
AST SERPL-CCNC: 25 U/L — SIGNIFICANT CHANGE UP (ref 15–37)
BILIRUB SERPL-MCNC: 0.1 MG/DL — LOW (ref 0.2–1.2)
BUN SERPL-MCNC: 83 MG/DL — HIGH (ref 7–23)
CALCIUM SERPL-MCNC: 7.6 MG/DL — LOW (ref 8.5–10.1)
CHLORIDE SERPL-SCNC: 116 MMOL/L — HIGH (ref 96–108)
CHOLEST SERPL-MCNC: 147 MG/DL — SIGNIFICANT CHANGE UP
CO2 SERPL-SCNC: 16 MMOL/L — LOW (ref 22–31)
CREAT SERPL-MCNC: 3.84 MG/DL — HIGH (ref 0.5–1.3)
CULTURE RESULTS: SIGNIFICANT CHANGE UP
EGFR: 17 ML/MIN/1.73M2 — LOW
ESTIMATED AVERAGE GLUCOSE: 117 MG/DL — HIGH (ref 68–114)
GLUCOSE SERPL-MCNC: 154 MG/DL — HIGH (ref 70–99)
HCT VFR BLD CALC: 29.8 % — LOW (ref 39–50)
HDLC SERPL-MCNC: 59 MG/DL — SIGNIFICANT CHANGE UP
HGB BLD-MCNC: 9.3 G/DL — LOW (ref 13–17)
LIPID PNL WITH DIRECT LDL SERPL: 71 MG/DL — SIGNIFICANT CHANGE UP
MCHC RBC-ENTMCNC: 30.5 PG — SIGNIFICANT CHANGE UP (ref 27–34)
MCHC RBC-ENTMCNC: 31.2 G/DL — LOW (ref 32–36)
MCV RBC AUTO: 97.7 FL — SIGNIFICANT CHANGE UP (ref 80–100)
NON HDL CHOLESTEROL: 88 MG/DL — SIGNIFICANT CHANGE UP
NRBC # BLD: 0 /100 WBCS — SIGNIFICANT CHANGE UP (ref 0–0)
PLATELET # BLD AUTO: 234 K/UL — SIGNIFICANT CHANGE UP (ref 150–400)
POTASSIUM SERPL-MCNC: 4.8 MMOL/L — SIGNIFICANT CHANGE UP (ref 3.5–5.3)
POTASSIUM SERPL-SCNC: 4.8 MMOL/L — SIGNIFICANT CHANGE UP (ref 3.5–5.3)
PROT SERPL-MCNC: 5.8 GM/DL — LOW (ref 6–8.3)
RBC # BLD: 3.05 M/UL — LOW (ref 4.2–5.8)
RBC # FLD: 15.6 % — HIGH (ref 10.3–14.5)
SODIUM SERPL-SCNC: 140 MMOL/L — SIGNIFICANT CHANGE UP (ref 135–145)
SPECIMEN SOURCE: SIGNIFICANT CHANGE UP
TRIGL SERPL-MCNC: 88 MG/DL — SIGNIFICANT CHANGE UP
TSH SERPL-MCNC: 1.38 UIU/ML — SIGNIFICANT CHANGE UP (ref 0.36–3.74)
VIT B12 SERPL-MCNC: 548 PG/ML — SIGNIFICANT CHANGE UP (ref 232–1245)
WBC # BLD: 7.91 K/UL — SIGNIFICANT CHANGE UP (ref 3.8–10.5)
WBC # FLD AUTO: 7.91 K/UL — SIGNIFICANT CHANGE UP (ref 3.8–10.5)

## 2022-09-01 PROCEDURE — 99232 SBSQ HOSP IP/OBS MODERATE 35: CPT

## 2022-09-01 PROCEDURE — 93880 EXTRACRANIAL BILAT STUDY: CPT | Mod: 26

## 2022-09-01 RX ORDER — INFLUENZA VIRUS VACCINE 15; 15; 15; 15 UG/.5ML; UG/.5ML; UG/.5ML; UG/.5ML
0.5 SUSPENSION INTRAMUSCULAR ONCE
Refills: 0 | Status: DISCONTINUED | OUTPATIENT
Start: 2022-09-01 | End: 2022-09-05

## 2022-09-01 RX ADMIN — OXYCODONE HYDROCHLORIDE 15 MILLIGRAM(S): 5 TABLET ORAL at 02:54

## 2022-09-01 RX ADMIN — SODIUM CHLORIDE 100 MILLILITER(S): 9 INJECTION, SOLUTION INTRAVENOUS at 10:39

## 2022-09-01 RX ADMIN — POLYETHYLENE GLYCOL 3350 17 GRAM(S): 17 POWDER, FOR SOLUTION ORAL at 10:32

## 2022-09-01 RX ADMIN — PANTOPRAZOLE SODIUM 40 MILLIGRAM(S): 20 TABLET, DELAYED RELEASE ORAL at 06:26

## 2022-09-01 RX ADMIN — OXYCODONE HYDROCHLORIDE 15 MILLIGRAM(S): 5 TABLET ORAL at 20:00

## 2022-09-01 RX ADMIN — OXYCODONE HYDROCHLORIDE 15 MILLIGRAM(S): 5 TABLET ORAL at 11:08

## 2022-09-01 RX ADMIN — Medication 100 MILLIGRAM(S): at 10:32

## 2022-09-01 RX ADMIN — CYCLOBENZAPRINE HYDROCHLORIDE 5 MILLIGRAM(S): 10 TABLET, FILM COATED ORAL at 13:23

## 2022-09-01 RX ADMIN — Medication 30 MILLIGRAM(S): at 06:27

## 2022-09-01 RX ADMIN — CYCLOBENZAPRINE HYDROCHLORIDE 5 MILLIGRAM(S): 10 TABLET, FILM COATED ORAL at 06:26

## 2022-09-01 RX ADMIN — CYCLOBENZAPRINE HYDROCHLORIDE 5 MILLIGRAM(S): 10 TABLET, FILM COATED ORAL at 21:28

## 2022-09-01 RX ADMIN — SODIUM CHLORIDE 75 MILLILITER(S): 9 INJECTION, SOLUTION INTRAVENOUS at 13:24

## 2022-09-01 RX ADMIN — OXYCODONE HYDROCHLORIDE 15 MILLIGRAM(S): 5 TABLET ORAL at 10:28

## 2022-09-01 RX ADMIN — Medication 1 MILLIGRAM(S): at 10:32

## 2022-09-01 RX ADMIN — ONDANSETRON 4 MILLIGRAM(S): 8 TABLET, FILM COATED ORAL at 20:26

## 2022-09-01 RX ADMIN — OXYCODONE HYDROCHLORIDE 15 MILLIGRAM(S): 5 TABLET ORAL at 21:14

## 2022-09-01 NOTE — PROGRESS NOTE ADULT - ASSESSMENT
60 yo male w/ pmhx of chronic pancreatitis s/p pusestow procedure (complicated by chronic pain of which secondary to alcohol abuse), CKD4, hep C s/p SVR and benign esophageal stricture (post-stent 4/19/22 c/b need for CRE balloon dilation, reposition and suture on 4/29) admitted to [facility] for mechnical fall secondary to presumptive syncope, likely secondary to dehydration from YUNIEL    GENERAL MEDICINE  # mechanical fall  # syncope???  - trauma work up (CT Brain/facial/chest/abd/pelvis)- negative  - possibly secondary to syncope from dehydration  - unclear if pt fainted. pt is a poor historian  - will have to assume syncope    NEPHROLOGY  # yuniel on CKD Stage 3b?  - likely secondary to dehydration  - creatinine 2.22 a month ago, now 3.92    INFECTIOUS DISEASE  # leukocytosis  - unclear etiology  - c/w empiric abx for now    PLAN  - [facility]  - consult CARDIO  - PT  - gentle hydration  - hold lasix for now  - orthostatics  - consult GI for abdominal pain                DVT prophylaxis: > Lovenox 40mg SQ daily  > Heparin drip  > SCD's     60 yo male w/ pmhx of chronic pancreatitis s/p pusestow procedure (complicated by chronic pain of which secondary to alcohol abuse), CKD4, hep C s/p SVR and benign esophageal stricture (post-stent 4/19/22 c/b need for CRE balloon dilation, reposition and suture on 4/29) admitted to [facility] for mechnical fall secondary to presumptive syncope, likely secondary to dehydration from Y[facility]    GENERAL MEDICINE  # mechanical fall  # syncope???  - trauma work up (CT Brain/facial/chest/abd/pelvis)- negative  - possibly secondary to syncope from dehydration  - unclear if pt fainted. pt is a poor historian  - will have to assume syncope    NEPHROLOGY  # yuniel on CKD Stage 3b?  - likely secondary to dehydration  - creatinine 2.22 a month ago, now 3.92    INFECTIOUS DISEASE  # leukocytosis  - unclear etiology  - c/w empiric abx for now    GASTROENTEROLOGY  # esophgeal stent  - placed in july 2022  - to be removed in 1-2 months  - pt lost to outpt follow up    PLAN  - [facility]  - consult CARDIO  - consult GASTRO for evaluation of esophgeal stent  - PT  - gentle hydration  - hold lasix for now  - orthostatics  - consult GI for abdominal pain                DVT prophylaxis: > Lovenox 40mg SQ daily  > Heparin drip  > SCD's     60 yo male w/ pmhx of chronic pancreatitis s/p pusestow procedure (complicated by chronic pain of which secondary to alcohol abuse), CKD4, hep C s/p SVR and benign esophageal stricture (post-stent 4/19/22 c/b need for CRE balloon dilation, reposition and suture on 4/29) admitted to [facility] for mechnical fall secondary to presumptive syncope, likely secondary to dehydration from [facility]    GENERAL MEDICINE  # mechanical fall  # syncope???  - trauma work up (CT Brain/facial/chest/abd/pelvis)- negative  - possibly secondary to syncope from dehydration  - unclear if pt fainted. pt is a poor historian  - will have to assume syncope    NEPHROLOGY  # yuniel on CKD Stage 3b?  - likely secondary to dehydration  - creatinine 2.22 a month ago, now 3.92    INFECTIOUS DISEASE  # leukocytosis  - unclear etiology  - c/w empiric abx for now    GASTROENTEROLOGY  # esophgeal stent  - placed in april 2022  - to be removed in 1-2 months  - pt lost to outpt follow up    # sp puestow procedure  - consult SURGERY for evaluation due to being lost to follow up    PLAN  - [facility]  - consult CARDIO  - consult GASTRO for evaluation of esophgeal stent  - consult SURGERY for evaluation of follow up for puestow procedure  - PT  - gentle hydration  - hold lasix for now  - orthostatics      DVT prophylaxis: > Lovenox 40mg SQ daily  > Heparin drip  > SCD's     60 yo male w/ pmhx of chronic pancreatitis s/p pusestow procedure (complicated by chronic pain of which secondary to alcohol abuse), CKD4, hep C s/p SVR and benign esophageal stricture (post-stent 4/19/22 c/b need for CRE balloon dilation, reposition and suture on 4/29) admitted to [facility] for mechnical fall secondary to presumptive syncope, likely secondary to dehydration from [facility]I[place]    GENERAL MEDICINE  # mechanical fall  # syncope???  - trauma work up (CT Brain/facial/chest/abd/pelvis)- negative  - possibly secondary to syncope from dehydration  - unclear if pt fainted. pt is a poor historian  - will have to assume syncope    NEPHROLOGY  # yuniel on CKD Stage 3b?  - likely secondary to dehydration  - creatinine 2.22 a month ago, now 3.92    INFECTIOUS DISEASE  # leukocytosis  - unclear etiology  - c/w empiric abx for now    GASTROENTEROLOGY  # esophgeal stent  - placed in april 2022  - to be removed in 1-2 months  - pt lost to outpt follow up    # sp puestow procedure  - consult SURGERY for evaluation due to being lost to follow up  - discussed with SURGICAL service, who will see pt tomorrow    PLAN  - cw [facility]  - consult CARDIO  - consult GASTRO for evaluation of esophgeal stent  - consult SURGERY for evaluation of follow up for puestow procedure  - PT  - gentle hydration  - hold lasix for now  - orthostatics      DVT prophylaxis: > Lovenox 40mg SQ daily  > Heparin drip  > SCD's     60 yo male w/ pmhx of chronic pancreatitis s/p pusestow procedure (complicated by chronic pain of which secondary to alcohol abuse), CKD4, hep C s/p SVR and benign esophageal stricture (post-stent 4/19/22 c/b need for CRE balloon dilation, reposition and suture on 4/29) admitted to [facility] for mechnical fall secondary to presumptive syncope, likely secondary to dehydration from [facility]    GENERAL MEDICINE  # mechanical fall  # syncope???  - trauma work up (CT Brain/facial/chest/abd/pelvis)- negative  - possibly secondary to syncope from dehydration  - unclear if pt fainted. pt is a poor historian  - will have to assume syncope    NEPHROLOGY  # yuniel on CKD Stage 3b?  - likely secondary to dehydration  - creatinine 2.22 a month ago, now 3.92    INFECTIOUS DISEASE  # leukocytosis  - unclear etiology  - c/w empiric abx for now    GASTROENTEROLOGY  # esophgeal stent  - placed in april 2022  - to be removed in 1-2 months  - pt lost to outpt follow up  - discussed w/ GI- pt to follow up with original GI for stent removal    # sp puestow procedure  - consult SURGERY for evaluation due to being lost to follow up  - discussed with SURGICAL service, who will see pt tomorrow    PLAN  - cw [facility]  - consult CARDIO  - consult SURGERY for evaluation of follow up for puestow procedure  - PT  - gentle hydration  - hold lasix for now  - orthostatics      DVT prophylaxis: > Lovenox 40mg SQ daily  > Heparin drip  > SCD's

## 2022-09-01 NOTE — PATIENT PROFILE ADULT - FALL HARM RISK - HARM RISK INTERVENTIONS

## 2022-09-02 LAB
ANION GAP SERPL CALC-SCNC: 5 MMOL/L — SIGNIFICANT CHANGE UP (ref 5–17)
BUN SERPL-MCNC: 76 MG/DL — HIGH (ref 7–23)
CALCIUM SERPL-MCNC: 7.9 MG/DL — LOW (ref 8.5–10.1)
CHLORIDE SERPL-SCNC: 118 MMOL/L — HIGH (ref 96–108)
CO2 SERPL-SCNC: 17 MMOL/L — LOW (ref 22–31)
CREAT SERPL-MCNC: 3.33 MG/DL — HIGH (ref 0.5–1.3)
EGFR: 20 ML/MIN/1.73M2 — LOW
GLUCOSE SERPL-MCNC: 122 MG/DL — HIGH (ref 70–99)
POTASSIUM SERPL-MCNC: 5.4 MMOL/L — HIGH (ref 3.5–5.3)
POTASSIUM SERPL-SCNC: 5.4 MMOL/L — HIGH (ref 3.5–5.3)
SODIUM SERPL-SCNC: 140 MMOL/L — SIGNIFICANT CHANGE UP (ref 135–145)

## 2022-09-02 PROCEDURE — 99233 SBSQ HOSP IP/OBS HIGH 50: CPT

## 2022-09-02 RX ORDER — HEPARIN SODIUM 5000 [USP'U]/ML
5000 INJECTION INTRAVENOUS; SUBCUTANEOUS EVERY 12 HOURS
Refills: 0 | Status: DISCONTINUED | OUTPATIENT
Start: 2022-09-02 | End: 2022-09-05

## 2022-09-02 RX ADMIN — CYCLOBENZAPRINE HYDROCHLORIDE 5 MILLIGRAM(S): 10 TABLET, FILM COATED ORAL at 05:19

## 2022-09-02 RX ADMIN — OXYCODONE HYDROCHLORIDE 15 MILLIGRAM(S): 5 TABLET ORAL at 06:53

## 2022-09-02 RX ADMIN — OXYCODONE HYDROCHLORIDE 15 MILLIGRAM(S): 5 TABLET ORAL at 18:08

## 2022-09-02 RX ADMIN — OXYCODONE HYDROCHLORIDE 15 MILLIGRAM(S): 5 TABLET ORAL at 09:51

## 2022-09-02 RX ADMIN — PANTOPRAZOLE SODIUM 40 MILLIGRAM(S): 20 TABLET, DELAYED RELEASE ORAL at 07:30

## 2022-09-02 RX ADMIN — OXYCODONE HYDROCHLORIDE 15 MILLIGRAM(S): 5 TABLET ORAL at 05:19

## 2022-09-02 RX ADMIN — HEPARIN SODIUM 5000 UNIT(S): 5000 INJECTION INTRAVENOUS; SUBCUTANEOUS at 18:08

## 2022-09-02 RX ADMIN — OXYCODONE HYDROCHLORIDE 15 MILLIGRAM(S): 5 TABLET ORAL at 19:30

## 2022-09-02 RX ADMIN — OXYCODONE HYDROCHLORIDE 15 MILLIGRAM(S): 5 TABLET ORAL at 01:01

## 2022-09-02 RX ADMIN — Medication 100 MILLIGRAM(S): at 12:02

## 2022-09-02 RX ADMIN — OXYCODONE HYDROCHLORIDE 15 MILLIGRAM(S): 5 TABLET ORAL at 23:46

## 2022-09-02 RX ADMIN — CYCLOBENZAPRINE HYDROCHLORIDE 5 MILLIGRAM(S): 10 TABLET, FILM COATED ORAL at 14:28

## 2022-09-02 RX ADMIN — SODIUM CHLORIDE 75 MILLILITER(S): 9 INJECTION, SOLUTION INTRAVENOUS at 05:19

## 2022-09-02 RX ADMIN — ONDANSETRON 4 MILLIGRAM(S): 8 TABLET, FILM COATED ORAL at 21:13

## 2022-09-02 RX ADMIN — Medication 30 MILLIGRAM(S): at 05:20

## 2022-09-02 RX ADMIN — CYCLOBENZAPRINE HYDROCHLORIDE 5 MILLIGRAM(S): 10 TABLET, FILM COATED ORAL at 21:13

## 2022-09-02 RX ADMIN — OXYCODONE HYDROCHLORIDE 15 MILLIGRAM(S): 5 TABLET ORAL at 10:51

## 2022-09-02 RX ADMIN — OXYCODONE HYDROCHLORIDE 15 MILLIGRAM(S): 5 TABLET ORAL at 22:46

## 2022-09-02 RX ADMIN — ONDANSETRON 4 MILLIGRAM(S): 8 TABLET, FILM COATED ORAL at 05:19

## 2022-09-02 RX ADMIN — OXYCODONE HYDROCHLORIDE 15 MILLIGRAM(S): 5 TABLET ORAL at 14:04

## 2022-09-02 RX ADMIN — OXYCODONE HYDROCHLORIDE 15 MILLIGRAM(S): 5 TABLET ORAL at 00:00

## 2022-09-02 RX ADMIN — Medication 1 MILLIGRAM(S): at 12:02

## 2022-09-02 RX ADMIN — OXYCODONE HYDROCHLORIDE 15 MILLIGRAM(S): 5 TABLET ORAL at 15:04

## 2022-09-02 NOTE — PROGRESS NOTE ADULT - ASSESSMENT
60 yo male w/ pmhx of chronic pancreatitis s/p pusestow procedure (complicated by chronic pain of which secondary to alcohol abuse), CKD4, hep C s/p SVR and benign esophageal stricture (post-stent 4/19/22 c/b need for CRE balloon dilation, reposition and suture on 4/29) admitted to [facility] for mechnical fall secondary to presumptive syncope, likely secondary to dehydration from [facility]    # yuniel on CKD Stage 3b? - likely secondary to dehydration - creatinine 2.22 a month ago, now 3.92.  Improving.  Monitor BMP.   # mechanical fall, ? syncope??? - trauma work up (CT Brain/facial/chest/abd/pelvis)- negative - possibly secondary to syncope from dehydration.  Seen by cardiology.  Seen by PT, recommending CARMEN.  Pt refuses.    # leukocytosis - due to above.  Normalized.  Off antibiotics.  No s/s of infection.  # esophgeal stent - placed in april 2022 - to be removed in 1-2 months - pt lost to outpt follow up - discussed w/ GI- pt to follow up with original GI for stent removal  # Chronic Pain - unchanged, pt reports associated with chronic pancreatitis.  ISTOP reviewed, on Oxycodone 5mg - 12 tabs daily last RX 8/10.  Will continue Oxycodone for now.   # sp puestow procedure - seen by Surgery, recommended outpatient surgical followup.  # Inpatient DVT prophylaxis - subcutaneous Heparin

## 2022-09-02 NOTE — CONSULT NOTE ADULT - SUBJECTIVE AND OBJECTIVE BOX
Chief Complaint:  Patient is a 61y old  Male who presents with a chief complaint of syncopal episode    HPI:  This is a 62 yo M with hx of chronic pancreatitis s/p pusestow procedure (complicated by chronic pain of which secondary to alcohol abuse), CKD4, hep C s/p SVR and benign esophageal stricture (post-stent 4/19/22 c/b need for CRE balloon dilation, reposition and suture on 4/29) presenting s/p fall in a store today. Unclear whether patient lost consciousness as he is a poor historian. Denies prodromal dizziness, cp, sob, palpitations or diaphoresis. ambulates with a walker. denies headache neck pain, fevers, chills, chest pain, shortness of breath, abdominal pain, drug use, alcohol use, visual complaints, weakness, subjective neurological deficits. labs significant for wbc 10.7 hgb 10 normocytic, k 5.8, bun/creat 108/3.92 (2.22 a month ago) prnp 1217. TTE 7/14/22 normal ef, mild/mod AI. ct brain no acute pathology, ct abd chest, spine, head no acute pathology. given vanco/zosyn and 2L ns in ed (31 Aug 2022 03:43)      PMH/PSH:PAST MEDICAL & SURGICAL HISTORY:  ETOH abuse  sober x1 year approximately      Pancreatitis      Hepatitis C  treated      Gout      HTN (hypertension)      Chronic kidney disease (CKD)      H/O chronic pancreatitis      Gout      Alcohol abuse      Gastric perforation          Allergies:  Tylenol (Unknown)      Medications:  aluminum hydroxide/magnesium hydroxide/simethicone Suspension 30 milliLiter(s) Oral every 4 hours PRN  cyclobenzaprine 5 milliGRAM(s) Oral three times a day  dextrose 5% + sodium chloride 0.9%. 1000 milliLiter(s) IV Continuous <Continuous>  folic acid 1 milliGRAM(s) Oral daily  influenza   Vaccine 0.5 milliLiter(s) IntraMuscular once  melatonin 3 milliGRAM(s) Oral at bedtime PRN  NIFEdipine XL 30 milliGRAM(s) Oral daily  ondansetron Injectable 4 milliGRAM(s) IV Push every 8 hours PRN  oxyCODONE    IR 15 milliGRAM(s) Oral every 4 hours PRN  pantoprazole    Tablet 40 milliGRAM(s) Oral before breakfast  polyethylene glycol 3350 17 Gram(s) Oral daily  thiamine 100 milliGRAM(s) Oral daily      REVIEW OF SYSTEMS:  All other review of systems is negative unless indicated above.    Relevant Family History:   FAMILY HISTORY:  FH: HTN (hypertension)        Relevant Social History:  Denies ETOH or tobacco history    Physical Exam:    Vital Signs:  Vital Signs Last 24 Hrs  T(C): 36.7 (02 Sep 2022 04:40), Max: 36.8 (01 Sep 2022 23:34)  T(F): 98 (02 Sep 2022 04:40), Max: 98.3 (01 Sep 2022 23:34)  HR: 76 (02 Sep 2022 04:40) (76 - 89)  BP: 128/75 (02 Sep 2022 04:40) (110/71 - 157/83)  BP(mean): --  RR: 18 (02 Sep 2022 04:40) (18 - 18)  SpO2: 99% (02 Sep 2022 04:40) (97% - 99%)    Parameters below as of 02 Sep 2022 04:40  Patient On (Oxygen Delivery Method): room air      Daily     Daily     Constitutional: WDWN resting comfortably in bed; NAD  HEENT: NC/AT, PERRL, EOMI, anicteric sclera, no nasal discharge; uvula midline, no oropharyngeal erythema or exudates  Neck: supple; no JVD or thyromegaly  Respiratory: CTA B/L; no W/R/R, no retractions  Cardiac: +S1/S2; RRR; no M/R/G; PMI non-displaced  Gastrointestinal: soft, NT/ND; no rebound or guarding; +BS   Extremities: , no clubbing or cyanosis; no peripheral edema  Musculoskeletal:  no joint swelling, tenderness or erythema  Vascular: 2+ radial, femoral, DP/PT pulses B/L  Skin: warm, dry and intact; no rashes, wounds, or scars  Neurologic: AAOx3; CNS grossly intact; no focal deficits no asterixis, no tremor, no encephalopathy    Laboratory:                          9.3    7.91  )-----------( 234      ( 01 Sep 2022 08:58 )             29.8     09-01    140  |  116<H>  |  83<H>  ----------------------------<  154<H>  4.8   |  16<L>  |  3.84<H>    Ca    7.6<L>      01 Sep 2022 08:58    TPro  5.8<L>  /  Alb  2.4<L>  /  TBili  0.1<L>  /  DBili  x   /  AST  25  /  ALT  29  /  AlkPhos  69  09-01    LIVER FUNCTIONS - ( 01 Sep 2022 08:58 )  Alb: 2.4 g/dL / Pro: 5.8 gm/dL / ALK PHOS: 69 U/L / ALT: 29 U/L / AST: 25 U/L / GGT: x                   Intake and Output    09-01-22 @ 07:01  -  09-02-22 @ 07:00  --------------------------------------------------------  IN: 1050 mL / OUT: 1050 mL / NET: 0 mL

## 2022-09-02 NOTE — CONSULT NOTE ADULT - ASSESSMENT
61M with chronic pancreatitis, s/p puestow procedure, admitted for syncope, surgery consulted as pt is lost to surgical follow up    -no emergent need for surgical intervention  -continue medical optimization  -for follow up of puestow procedure please see Dr Christopher Gutierrez at [facility]  
LOC, possible syncope  elevated SCr  unreliable historian    monitor on tele  check orthostatics

## 2022-09-03 LAB
ANION GAP SERPL CALC-SCNC: 6 MMOL/L — SIGNIFICANT CHANGE UP (ref 5–17)
BUN SERPL-MCNC: 65 MG/DL — HIGH (ref 7–23)
CALCIUM SERPL-MCNC: 8.3 MG/DL — LOW (ref 8.5–10.1)
CHLORIDE SERPL-SCNC: 119 MMOL/L — HIGH (ref 96–108)
CO2 SERPL-SCNC: 17 MMOL/L — LOW (ref 22–31)
CREAT SERPL-MCNC: 3.35 MG/DL — HIGH (ref 0.5–1.3)
EGFR: 20 ML/MIN/1.73M2 — LOW
GLUCOSE SERPL-MCNC: 123 MG/DL — HIGH (ref 70–99)
HCT VFR BLD CALC: 29.3 % — LOW (ref 39–50)
HGB BLD-MCNC: 9.3 G/DL — LOW (ref 13–17)
MCHC RBC-ENTMCNC: 30.9 PG — SIGNIFICANT CHANGE UP (ref 27–34)
MCHC RBC-ENTMCNC: 31.7 G/DL — LOW (ref 32–36)
MCV RBC AUTO: 97.3 FL — SIGNIFICANT CHANGE UP (ref 80–100)
NRBC # BLD: 0 /100 WBCS — SIGNIFICANT CHANGE UP (ref 0–0)
PLATELET # BLD AUTO: 234 K/UL — SIGNIFICANT CHANGE UP (ref 150–400)
POTASSIUM SERPL-MCNC: 5.3 MMOL/L — SIGNIFICANT CHANGE UP (ref 3.5–5.3)
POTASSIUM SERPL-SCNC: 5.3 MMOL/L — SIGNIFICANT CHANGE UP (ref 3.5–5.3)
RBC # BLD: 3.01 M/UL — LOW (ref 4.2–5.8)
RBC # FLD: 15.8 % — HIGH (ref 10.3–14.5)
SODIUM SERPL-SCNC: 142 MMOL/L — SIGNIFICANT CHANGE UP (ref 135–145)
WBC # BLD: 8.19 K/UL — SIGNIFICANT CHANGE UP (ref 3.8–10.5)
WBC # FLD AUTO: 8.19 K/UL — SIGNIFICANT CHANGE UP (ref 3.8–10.5)

## 2022-09-03 PROCEDURE — 99232 SBSQ HOSP IP/OBS MODERATE 35: CPT

## 2022-09-03 RX ORDER — SODIUM CHLORIDE 9 MG/ML
1000 INJECTION, SOLUTION INTRAVENOUS
Refills: 0 | Status: COMPLETED | OUTPATIENT
Start: 2022-09-03 | End: 2022-09-03

## 2022-09-03 RX ADMIN — CYCLOBENZAPRINE HYDROCHLORIDE 5 MILLIGRAM(S): 10 TABLET, FILM COATED ORAL at 13:21

## 2022-09-03 RX ADMIN — CYCLOBENZAPRINE HYDROCHLORIDE 5 MILLIGRAM(S): 10 TABLET, FILM COATED ORAL at 05:24

## 2022-09-03 RX ADMIN — OXYCODONE HYDROCHLORIDE 15 MILLIGRAM(S): 5 TABLET ORAL at 04:16

## 2022-09-03 RX ADMIN — Medication 30 MILLIGRAM(S): at 05:24

## 2022-09-03 RX ADMIN — HEPARIN SODIUM 5000 UNIT(S): 5000 INJECTION INTRAVENOUS; SUBCUTANEOUS at 17:03

## 2022-09-03 RX ADMIN — Medication 1 MILLIGRAM(S): at 11:04

## 2022-09-03 RX ADMIN — OXYCODONE HYDROCHLORIDE 15 MILLIGRAM(S): 5 TABLET ORAL at 03:16

## 2022-09-03 RX ADMIN — OXYCODONE HYDROCHLORIDE 15 MILLIGRAM(S): 5 TABLET ORAL at 22:20

## 2022-09-03 RX ADMIN — SODIUM CHLORIDE 100 MILLILITER(S): 9 INJECTION, SOLUTION INTRAVENOUS at 16:08

## 2022-09-03 RX ADMIN — OXYCODONE HYDROCHLORIDE 15 MILLIGRAM(S): 5 TABLET ORAL at 17:42

## 2022-09-03 RX ADMIN — Medication 30 MILLILITER(S): at 11:07

## 2022-09-03 RX ADMIN — CYCLOBENZAPRINE HYDROCHLORIDE 5 MILLIGRAM(S): 10 TABLET, FILM COATED ORAL at 21:01

## 2022-09-03 RX ADMIN — OXYCODONE HYDROCHLORIDE 15 MILLIGRAM(S): 5 TABLET ORAL at 17:07

## 2022-09-03 RX ADMIN — HEPARIN SODIUM 5000 UNIT(S): 5000 INJECTION INTRAVENOUS; SUBCUTANEOUS at 05:24

## 2022-09-03 RX ADMIN — OXYCODONE HYDROCHLORIDE 15 MILLIGRAM(S): 5 TABLET ORAL at 11:08

## 2022-09-03 RX ADMIN — PANTOPRAZOLE SODIUM 40 MILLIGRAM(S): 20 TABLET, DELAYED RELEASE ORAL at 08:00

## 2022-09-03 RX ADMIN — OXYCODONE HYDROCHLORIDE 15 MILLIGRAM(S): 5 TABLET ORAL at 11:32

## 2022-09-03 RX ADMIN — OXYCODONE HYDROCHLORIDE 15 MILLIGRAM(S): 5 TABLET ORAL at 21:17

## 2022-09-03 RX ADMIN — Medication 100 MILLIGRAM(S): at 11:04

## 2022-09-03 RX ADMIN — POLYETHYLENE GLYCOL 3350 17 GRAM(S): 17 POWDER, FOR SOLUTION ORAL at 11:03

## 2022-09-03 RX ADMIN — Medication 30 MILLILITER(S): at 17:07

## 2022-09-03 NOTE — DIETITIAN INITIAL EVALUATION ADULT - PERTINENT LABORATORY DATA
09-02    140  |  118<H>  |  76<H>  ----------------------------<  122<H>  5.4<H>   |  17<L>  |  3.33<H>    Ca    7.9<L>      02 Sep 2022 07:55    A1C with Estimated Average Glucose Result: 5.7 %<H> (09-01-22 @ 08:58)  A1C with Estimated Average Glucose Result: 5.5 % (04-29-22 @ 05:36)  A1C with Estimated Average Glucose Result: 5.5 % (02-09-22 @ 17:47)

## 2022-09-03 NOTE — DIETITIAN INITIAL EVALUATION ADULT - NS FNS WEIGHT CHANGE REASON
As per previous adm RD note, 03/20/19, pt c wt. of 57.5 kg/126.7 LBS.  pt had reported usual wt. of 218/99 kg, c wt. loss of 92 LBS in 11 years.   07/13/22, ? wt. pf 72 kg  06/11/22, 53.1kg  08/31, 57.3 kg

## 2022-09-03 NOTE — DIETITIAN INITIAL EVALUATION ADULT - ETIOLOGY
inadequate protein-energy intake in setting of hx of alcohol abuse, pancreatitis, esophageal stricture, Hepatitis C, CKD, social circumstances/hx of homelessness

## 2022-09-03 NOTE — DIETITIAN NUTRITION RISK NOTIFICATION - TREATMENT: THE FOLLOWING DIET HAS BEEN RECOMMENDED
Diet, Soft and Bite Sized:   Low Fat (LOWFAT)  No Concentrated Potassium  Low Sodium  Supplement Feeding Modality:  Oral  Suplena Cans or Servings Per Day:  1       Frequency:  Two Times a day (09-03-22 @ 10:51) [Pending Verification By Attending]  Diet, Soft and Bite Sized (08-31-22 @ 08:46) [Active]

## 2022-09-03 NOTE — DIETITIAN INITIAL EVALUATION ADULT - ORAL INTAKE PTA/DIET HISTORY
Pt c c/o abdominal pain, c hiccups, unable to provide detailed diet history @ present.  Pt requested better pain meds, RN made aware.   Pt is known to this RD from previous adm.  Per chart review/previous adm chart review, pt lived c family PTA.  Pt c excess amount of fluids including milk at bedside, RN & RD addressed food safety addressed with pt, hx of hoarding food, homelessness noted.

## 2022-09-03 NOTE — DIETITIAN INITIAL EVALUATION ADULT - PERTINENT MEDS FT
MEDICATIONS  (STANDING):  cyclobenzaprine 5 milliGRAM(s) Oral three times a day  folic acid 1 milliGRAM(s) Oral daily  heparin   Injectable 5000 Unit(s) SubCutaneous every 12 hours  influenza   Vaccine 0.5 milliLiter(s) IntraMuscular once  NIFEdipine XL 30 milliGRAM(s) Oral daily  pantoprazole    Tablet 40 milliGRAM(s) Oral before breakfast  polyethylene glycol 3350 17 Gram(s) Oral daily  thiamine 100 milliGRAM(s) Oral daily    MEDICATIONS  (PRN):  aluminum hydroxide/magnesium hydroxide/simethicone Suspension 30 milliLiter(s) Oral every 4 hours PRN Dyspepsia  melatonin 3 milliGRAM(s) Oral at bedtime PRN Insomnia  ondansetron Injectable 4 milliGRAM(s) IV Push every 8 hours PRN Nausea and/or Vomiting  oxyCODONE    IR 15 milliGRAM(s) Oral every 4 hours PRN Severe Pain (7 - 10)

## 2022-09-03 NOTE — DIETITIAN INITIAL EVALUATION ADULT - OTHER CALCULATIONS
Height (cm): 185.4 (08-30)  Weight (kg): 57.3 (08-31)  BMI (kg/m2): 16.7 (08-31)  IBW: 83.4 kg          % IBW: 68%          UBW: 99.0 kg             %UBW: 58%

## 2022-09-03 NOTE — DIETITIAN INITIAL EVALUATION ADULT - NS FNS REASON FOR WEIGHT CHANG
multiple hospitalization, ETOH abuse, chronic pancreatitis, esophageal stricture/ chronic illness/decreased po intake

## 2022-09-03 NOTE — PROGRESS NOTE ADULT - ASSESSMENT
62 yo male w/ pmhx of chronic pancreatitis s/p pusestow procedure (complicated by chronic pain of which secondary to alcohol abuse), CKD4, hep C s/p SVR and benign esophageal stricture (post-stent 4/19/22 c/b need for CRE balloon dilation, reposition and suture on 4/29) admitted to [facility] for mechnical fall secondary to presumptive syncope, likely secondary to dehydration from YUNIEL    # yuniel on CKD Stage 3b? - likely secondary to dehydration - creatinine 2.22 a month ago.  Improving.  Monitor BMP.   # mechanical fall, ? syncope??? - trauma work up (CT Brain/facial/chest/abd/pelvis)- negative - possibly secondary to syncope from dehydration.  Seen by cardiology.  Seen by PT, recommending CARMEN.  Pt refuses.    # leukocytosis - due to above.  Normalized.  Off antibiotics.  No s/s of infection.  # esophgeal stent - placed in april 2022 - to be removed in 1-2 months - pt lost to outpt follow up - discussed w/ GI- pt to follow up with original GI for stent removal  # Chronic Pain - unchanged, pt reports associated with chronic pancreatitis.  ISTOP reviewed, on Oxycodone 5mg - 12 tabs daily last RX 8/10.  Will continue Oxycodone for now.   # sp puestow procedure - seen by Surgery, recommended outpatient surgical followup.  # Inpatient DVT prophylaxis - subcutaneous Heparin

## 2022-09-04 LAB
ANION GAP SERPL CALC-SCNC: 5 MMOL/L — SIGNIFICANT CHANGE UP (ref 5–17)
BUN SERPL-MCNC: 68 MG/DL — HIGH (ref 7–23)
CALCIUM SERPL-MCNC: 8.2 MG/DL — LOW (ref 8.5–10.1)
CHLORIDE SERPL-SCNC: 114 MMOL/L — HIGH (ref 96–108)
CO2 SERPL-SCNC: 17 MMOL/L — LOW (ref 22–31)
CREAT SERPL-MCNC: 3.25 MG/DL — HIGH (ref 0.5–1.3)
EGFR: 21 ML/MIN/1.73M2 — LOW
GLUCOSE SERPL-MCNC: 138 MG/DL — HIGH (ref 70–99)
POTASSIUM SERPL-MCNC: 5.5 MMOL/L — HIGH (ref 3.5–5.3)
POTASSIUM SERPL-SCNC: 5.5 MMOL/L — HIGH (ref 3.5–5.3)
SODIUM SERPL-SCNC: 136 MMOL/L — SIGNIFICANT CHANGE UP (ref 135–145)

## 2022-09-04 PROCEDURE — 99232 SBSQ HOSP IP/OBS MODERATE 35: CPT

## 2022-09-04 RX ORDER — SODIUM CHLORIDE 9 MG/ML
1000 INJECTION, SOLUTION INTRAVENOUS
Refills: 0 | Status: COMPLETED | OUTPATIENT
Start: 2022-09-04 | End: 2022-09-04

## 2022-09-04 RX ORDER — SODIUM ZIRCONIUM CYCLOSILICATE 10 G/10G
10 POWDER, FOR SUSPENSION ORAL EVERY 8 HOURS
Refills: 0 | Status: COMPLETED | OUTPATIENT
Start: 2022-09-04 | End: 2022-09-05

## 2022-09-04 RX ORDER — OXYCODONE HYDROCHLORIDE 5 MG/1
5 TABLET ORAL ONCE
Refills: 0 | Status: DISCONTINUED | OUTPATIENT
Start: 2022-09-04 | End: 2022-09-04

## 2022-09-04 RX ADMIN — OXYCODONE HYDROCHLORIDE 15 MILLIGRAM(S): 5 TABLET ORAL at 18:05

## 2022-09-04 RX ADMIN — CYCLOBENZAPRINE HYDROCHLORIDE 5 MILLIGRAM(S): 10 TABLET, FILM COATED ORAL at 05:30

## 2022-09-04 RX ADMIN — CYCLOBENZAPRINE HYDROCHLORIDE 5 MILLIGRAM(S): 10 TABLET, FILM COATED ORAL at 21:38

## 2022-09-04 RX ADMIN — OXYCODONE HYDROCHLORIDE 15 MILLIGRAM(S): 5 TABLET ORAL at 17:35

## 2022-09-04 RX ADMIN — Medication 30 MILLILITER(S): at 11:10

## 2022-09-04 RX ADMIN — SODIUM CHLORIDE 100 MILLILITER(S): 9 INJECTION, SOLUTION INTRAVENOUS at 23:08

## 2022-09-04 RX ADMIN — Medication 30 MILLIGRAM(S): at 05:30

## 2022-09-04 RX ADMIN — OXYCODONE HYDROCHLORIDE 15 MILLIGRAM(S): 5 TABLET ORAL at 02:30

## 2022-09-04 RX ADMIN — CYCLOBENZAPRINE HYDROCHLORIDE 5 MILLIGRAM(S): 10 TABLET, FILM COATED ORAL at 13:03

## 2022-09-04 RX ADMIN — OXYCODONE HYDROCHLORIDE 15 MILLIGRAM(S): 5 TABLET ORAL at 11:42

## 2022-09-04 RX ADMIN — HEPARIN SODIUM 5000 UNIT(S): 5000 INJECTION INTRAVENOUS; SUBCUTANEOUS at 17:00

## 2022-09-04 RX ADMIN — OXYCODONE HYDROCHLORIDE 15 MILLIGRAM(S): 5 TABLET ORAL at 11:39

## 2022-09-04 RX ADMIN — OXYCODONE HYDROCHLORIDE 5 MILLIGRAM(S): 5 TABLET ORAL at 21:38

## 2022-09-04 RX ADMIN — Medication 30 MILLILITER(S): at 21:38

## 2022-09-04 RX ADMIN — OXYCODONE HYDROCHLORIDE 15 MILLIGRAM(S): 5 TABLET ORAL at 01:39

## 2022-09-04 RX ADMIN — SODIUM ZIRCONIUM CYCLOSILICATE 10 GRAM(S): 10 POWDER, FOR SUSPENSION ORAL at 23:08

## 2022-09-04 RX ADMIN — OXYCODONE HYDROCHLORIDE 5 MILLIGRAM(S): 5 TABLET ORAL at 22:38

## 2022-09-04 RX ADMIN — Medication 100 MILLIGRAM(S): at 11:08

## 2022-09-04 RX ADMIN — OXYCODONE HYDROCHLORIDE 15 MILLIGRAM(S): 5 TABLET ORAL at 07:23

## 2022-09-04 RX ADMIN — Medication 30 MILLILITER(S): at 17:33

## 2022-09-04 RX ADMIN — HEPARIN SODIUM 5000 UNIT(S): 5000 INJECTION INTRAVENOUS; SUBCUTANEOUS at 05:30

## 2022-09-04 RX ADMIN — OXYCODONE HYDROCHLORIDE 15 MILLIGRAM(S): 5 TABLET ORAL at 08:25

## 2022-09-04 RX ADMIN — PANTOPRAZOLE SODIUM 40 MILLIGRAM(S): 20 TABLET, DELAYED RELEASE ORAL at 07:23

## 2022-09-04 NOTE — PROGRESS NOTE ADULT - ASSESSMENT
62 yo male w/ pmhx of chronic pancreatitis s/p pusestow procedure (complicated by chronic pain of which secondary to alcohol abuse), CKD4, hep C s/p SVR and benign esophageal stricture (post-stent 4/19/22 c/b need for CRE balloon dilation, reposition and suture on 4/29) admitted to [facility] for mechnical fall secondary to presumptive syncope, likely secondary to dehydration from YUNEIL    # yuniel on CKD Stage 3b, suspected ATN - likely secondary to dehydration - creatinine 2.22 a month ago.  Improving.  Monitor BMP.   # mechanical fall, possible syncope - trauma work up (CT Brain/facial/chest/abd/pelvis)- negative - possibly secondary to syncope from dehydration.  Seen by cardiology.  Seen by PT, recommending CARMEN.  Pt refuses.    # leukocytosis - due to above.  Normalized.  Off antibiotics.  No s/s of infection.  # esophgeal stent - placed in april 2022 - to be removed in 1-2 months - pt lost to outpt follow up - discussed w/ GI- pt to follow up with original GI for stent removal  # Chronic Pain - unchanged, pt reports associated with chronic pancreatitis.  ISTOP reviewed, on Oxycodone 5mg - 12 tabs daily last RX 8/10.  Will continue Oxycodone for now.   # sp puestow procedure - seen by Surgery, recommended outpatient surgical followup.  # Inpatient DVT prophylaxis - subcutaneous Heparin        60 yo male w/ pmhx of chronic pancreatitis s/p pusestow procedure (complicated by chronic pain of which secondary to alcohol abuse), CKD4, hep C s/p SVR and benign esophageal stricture (post-stent 4/19/22 c/b need for CRE balloon dilation, reposition and suture on 4/29) admitted to [facility] for mechnical fall secondary to presumptive syncope, likely secondary to dehydration from YUNIEL    # yuniel on CKD Stage 3b, suspected ATN - likely secondary to dehydration - creatinine 2.22 a month ago.  Improving.  Monitor BMP. Monitor K in presence of likely metabolic acidosis due to YUNIEL.   # mechanical fall, possible syncope - trauma work up (CT Brain/facial/chest/abd/pelvis)- negative - possibly secondary to syncope from dehydration.  Seen by cardiology.  Seen by PT, recommending CARMEN.  Pt refuses.    # leukocytosis - due to above.  Normalized.  Off antibiotics.  No s/s of infection.  # esophgeal stent - placed in april 2022 - to be removed in 1-2 months - pt lost to outpt follow up - discussed w/ GI- pt to follow up with original GI for stent removal  # Chronic Pain - unchanged, pt reports associated with chronic pancreatitis.  ISTOP reviewed, on Oxycodone 5mg - 12 tabs daily last RX 8/10.  Will continue Oxycodone for now.   # sp puestow procedure - seen by Surgery, recommended outpatient surgical followup.  # Inpatient DVT prophylaxis - subcutaneous Heparin

## 2022-09-05 ENCOUNTER — TRANSCRIPTION ENCOUNTER (OUTPATIENT)
Age: 62
End: 2022-09-05

## 2022-09-05 VITALS
HEART RATE: 91 BPM | DIASTOLIC BLOOD PRESSURE: 81 MMHG | SYSTOLIC BLOOD PRESSURE: 133 MMHG | OXYGEN SATURATION: 97 % | TEMPERATURE: 98 F | RESPIRATION RATE: 17 BRPM

## 2022-09-05 LAB
ANION GAP SERPL CALC-SCNC: 6 MMOL/L — SIGNIFICANT CHANGE UP (ref 5–17)
BUN SERPL-MCNC: 61 MG/DL — HIGH (ref 7–23)
CALCIUM SERPL-MCNC: 8.3 MG/DL — LOW (ref 8.5–10.1)
CHLORIDE SERPL-SCNC: 110 MMOL/L — HIGH (ref 96–108)
CO2 SERPL-SCNC: 20 MMOL/L — LOW (ref 22–31)
CREAT SERPL-MCNC: 3.38 MG/DL — HIGH (ref 0.5–1.3)
EGFR: 20 ML/MIN/1.73M2 — LOW
GLUCOSE SERPL-MCNC: 120 MG/DL — HIGH (ref 70–99)
POTASSIUM SERPL-MCNC: 5.2 MMOL/L — SIGNIFICANT CHANGE UP (ref 3.5–5.3)
POTASSIUM SERPL-SCNC: 5.2 MMOL/L — SIGNIFICANT CHANGE UP (ref 3.5–5.3)
SODIUM SERPL-SCNC: 136 MMOL/L — SIGNIFICANT CHANGE UP (ref 135–145)

## 2022-09-05 PROCEDURE — 99239 HOSP IP/OBS DSCHRG MGMT >30: CPT

## 2022-09-05 RX ADMIN — Medication 30 MILLIGRAM(S): at 05:02

## 2022-09-05 RX ADMIN — OXYCODONE HYDROCHLORIDE 15 MILLIGRAM(S): 5 TABLET ORAL at 00:45

## 2022-09-05 RX ADMIN — OXYCODONE HYDROCHLORIDE 15 MILLIGRAM(S): 5 TABLET ORAL at 13:17

## 2022-09-05 RX ADMIN — OXYCODONE HYDROCHLORIDE 15 MILLIGRAM(S): 5 TABLET ORAL at 09:02

## 2022-09-05 RX ADMIN — Medication 30 MILLILITER(S): at 04:57

## 2022-09-05 RX ADMIN — PANTOPRAZOLE SODIUM 40 MILLIGRAM(S): 20 TABLET, DELAYED RELEASE ORAL at 07:38

## 2022-09-05 RX ADMIN — OXYCODONE HYDROCHLORIDE 15 MILLIGRAM(S): 5 TABLET ORAL at 09:58

## 2022-09-05 RX ADMIN — SODIUM ZIRCONIUM CYCLOSILICATE 10 GRAM(S): 10 POWDER, FOR SUSPENSION ORAL at 05:02

## 2022-09-05 RX ADMIN — CYCLOBENZAPRINE HYDROCHLORIDE 5 MILLIGRAM(S): 10 TABLET, FILM COATED ORAL at 13:17

## 2022-09-05 RX ADMIN — OXYCODONE HYDROCHLORIDE 15 MILLIGRAM(S): 5 TABLET ORAL at 01:44

## 2022-09-05 RX ADMIN — Medication 100 MILLIGRAM(S): at 11:02

## 2022-09-05 RX ADMIN — CYCLOBENZAPRINE HYDROCHLORIDE 5 MILLIGRAM(S): 10 TABLET, FILM COATED ORAL at 05:02

## 2022-09-05 RX ADMIN — HEPARIN SODIUM 5000 UNIT(S): 5000 INJECTION INTRAVENOUS; SUBCUTANEOUS at 05:02

## 2022-09-05 RX ADMIN — OXYCODONE HYDROCHLORIDE 15 MILLIGRAM(S): 5 TABLET ORAL at 04:57

## 2022-09-05 NOTE — PROGRESS NOTE ADULT - PROVIDER SPECIALTY LIST ADULT
Internal Medicine
Internal Medicine
Nephrology
Internal Medicine
Internal Medicine
Nephrology
Internal Medicine
Internal Medicine

## 2022-09-05 NOTE — DISCHARGE NOTE PROVIDER - NSDCFUSCHEDAPPT_GEN_ALL_CORE_FT
Lamberto Hurtado  [facility] Physician [facility]  INTMED OP [id number] [facility]  Scheduled Appointment: 09/06/2022    Xander Dye  [facility] Physician [facility]  PAINMGT [address]  Scheduled Appointment: 09/16/2022    Cirilo Edwards  [facility]  LIJOP Amb Surg Endoscopy  Scheduled Appointment: 09/23/2022    Gene Fraga  [facility] Physician [facility]  NEPHRO OP [id number] [facility]  Scheduled Appointment: 10/19/2022

## 2022-09-05 NOTE — DISCHARGE NOTE PROVIDER - CARE PROVIDER_API CALL
PAIN MANAGEMENT, PRIMARY MD, SURGERY,   Phone: (   )    -  Fax: (   )    -  Follow Up Time:     NEPHROLOGY,   Phone: (   )    -  Fax: (   )    -  Follow Up Time:     Austin Dukes  INTERNAL MEDICINE  [address] [facility], [address] [place] [id number]  Phone: [phone]  Fax: [phone]  Follow Up Time:

## 2022-09-05 NOTE — DISCHARGE NOTE PROVIDER - NSDCCPCAREPLAN_GEN_ALL_CORE_FT
PRINCIPAL DISCHARGE DIAGNOSIS  Diagnosis: Acute kidney injury (YUNIEL) with acute tubular necrosis (ATN)  Assessment and Plan of Treatment:       SECONDARY DISCHARGE DIAGNOSES  Diagnosis: AMS (altered mental status)  Assessment and Plan of Treatment:     Diagnosis: Fall  Assessment and Plan of Treatment:     Diagnosis: Unable to walk  Assessment and Plan of Treatment:     Diagnosis: Chronic pancreatitis  Assessment and Plan of Treatment:     Diagnosis: YUNIEL (acute kidney injury)  Assessment and Plan of Treatment:     Diagnosis: Stage 4 chronic kidney disease  Assessment and Plan of Treatment:

## 2022-09-05 NOTE — DISCHARGE NOTE PROVIDER - NSDCFUADDINST_GEN_ALL_CORE_FT
Suplena 1 can oral twice daily.    It is important to see your primary physician as well as any specialty physicians within the next week to perform a comprehensive medical review.  Call their offices for an appointment as soon as you leave the hospital.  You will also need to see them for renewal of your medications.  If have any difficulty following with a physician, contact the [facility] [phone] or via [email or web address].   To obtain your results, you can access the [email or web address] Patient Portal at [email or web address].  Your medical issues appear to be stable at this time, but if your symptoms recur or worsen, contact your physicians and/or return to the hospital if necessary.  If you encounter any issues or questions with your medication, call your physicians before stopping the medication.  Do not drive.  Limit your diet to 2 grams of sodium daily.

## 2022-09-05 NOTE — DISCHARGE NOTE PROVIDER - PROVIDER TOKENS
FREE:[LAST:[PAIN MANAGEMENT, PRIMARY MD, SURGERY],PHONE:[(   )    -],FAX:[(   )    -]],FREE:[LAST:[NEPHROLOGY],PHONE:[(   )    -],FAX:[(   )    -]],PROVIDER:[TOKEN:[[id number]]]

## 2022-09-05 NOTE — DISCHARGE NOTE PROVIDER - DETAILS OF MALNUTRITION DIAGNOSIS/DIAGNOSES
This patient has been assessed with a concern for Malnutrition and was treated during this hospitalization for the following Nutrition diagnosis/diagnoses:     -  09/03/2022: Severe protein-calorie malnutrition   -  09/03/2022: Underweight (BMI < 19)

## 2022-09-05 NOTE — DISCHARGE NOTE NURSING/CASE MANAGEMENT/SOCIAL WORK - NSDCPEFALRISK_GEN_ALL_CORE
For information on Fall & Injury Prevention, visit: [email or web address] OR  [email or web address] OR  https://www.cdc.gov/steadi/patient.html

## 2022-09-05 NOTE — PROGRESS NOTE ADULT - SUBJECTIVE AND OBJECTIVE BOX
Patient: IMELDA ROBERTSON [id number] 61y Male                            Hospitalist Attending Note      No complaints.   Tolerating po intake.     ____________________PHYSICAL EXAM:  GENERAL:  NAD Alert and Oriented x 3   HEENT: NCAT  CARDIOVASCULAR:  S1, S2  LUNGS: CTAB  ABDOMEN:  soft, (-) tenderness, (-) distension, (+) bowel sounds, (-) guarding, (-) rebound (-) rigidity  EXTREMITIES:  no cyanosis / clubbing / edema.   ____________________    VITALS:  Vital Signs Last 24 Hrs  T(C): 36.7 (05 Sep 2022 11:21), Max: 36.9 (05 Sep 2022 05:14)  T(F): 98 (05 Sep 2022 11:21), Max: 98.4 (05 Sep 2022 05:14)  HR: 91 (05 Sep 2022 11:21) (84 - 91)  BP: 133/81 (05 Sep 2022 11:21) (120/77 - 138/80)  BP(mean): --  RR: 17 (05 Sep 2022 11:21) (17 - 18)  SpO2: 97% (05 Sep 2022 11:21) (97% - 100%)    Parameters below as of 05 Sep 2022 11:21  Patient On (Oxygen Delivery Method): room air     Daily     Daily   CAPILLARY BLOOD GLUCOSE        I&O's Summary    04 Sep 2022 07:01  -  05 Sep 2022 07:00  --------------------------------------------------------  IN: 600 mL / OUT: 700 mL / NET: -100 mL        LABS:    09-05    136  |  110<H>  |  61<H>  ----------------------------<  120<H>  5.2   |  20<L>  |  3.38<H>    Ca    8.3<L>      05 Sep 2022 06:06                    MEDICATIONS:  aluminum hydroxide/magnesium hydroxide/simethicone Suspension 30 milliLiter(s) Oral every 4 hours PRN  cyclobenzaprine 5 milliGRAM(s) Oral three times a day  folic acid 1 milliGRAM(s) Oral daily  heparin   Injectable 5000 Unit(s) SubCutaneous every 12 hours  influenza   Vaccine 0.5 milliLiter(s) IntraMuscular once  melatonin 3 milliGRAM(s) Oral at bedtime PRN  NIFEdipine XL 30 milliGRAM(s) Oral daily  ondansetron Injectable 4 milliGRAM(s) IV Push every 8 hours PRN  oxyCODONE    IR 15 milliGRAM(s) Oral every 4 hours PRN  pantoprazole    Tablet 40 milliGRAM(s) Oral before breakfast  polyethylene glycol 3350 17 Gram(s) Oral daily  thiamine 100 milliGRAM(s) Oral daily    
NEPHROLOGY PROGRESS NOTE    CHIEF COMPLAINT:  YUNIEL/CKD    HPI:  No improvement in renal function yet.    ROS:  c/o abdominal pain    EXAM:  T(F): 97.8 (22 @ 11:41)  HR: 86 (22 @ 11:41)  BP: 110/71 (22 @ 11:41)  RR: 18 (22 @ 11:41)  SpO2: 97% (22 @ 11:41)    Conversant, in no apparent distress  Normal respiratory effort, lungs clear bilaterally  Heart RRR with no murmur, no peripheral edema         LABS                             9.3    7.91  )-----------( 234      ( 01 Sep 2022 08:58 )             29.8              140  |  116<H>  |  83<H>  ----------------------------<  154<H>  4.8   |  16<L>  |  3.84<H>    Ca    7.6<L>      01 Sep 2022 08:58    TPro  5.8<L>  /  Alb  2.4<L>  /  TBili  0.1<L>  /  DBili  x   /  AST  25  /  ALT  29  /  AlkPhos  69      Urinalysis Basic - ( 31 Aug 2022 02:00 )  Color: Yellow / Appearance: Clear / S.015 / pH: x  Gluc: x / Ketone: Negative  / Bili: Negative / Urobili: Negative mg/dL   Blood: x / Protein: 30 mg/dL / Nitrite: Negative   Leuk Esterase: Trace / RBC: 3-5 /HPF / WBC 6-10   Sq Epi: x / Non Sq Epi: Few / Bacteria: Few      Impression:  1.  YUNIEL on CKD(4) - prerenal vs ATN  2.  Hyperkalemia, resolved    Recommend:  Continue IVF 75 mL/hr one more day  BMP in AM  
Patient is a 61y old  Male who presents with a chief complaint of   INTERVAL HPI/OVERNIGHT EVENTS: no acute events overnight  SUBJECTIVE: reports abdominal pain    MEDICATIONS  (STANDING):  cyclobenzaprine 5 milliGRAM(s) Oral three times a day  dextrose 5% + sodium chloride 0.9%. 1000 milliLiter(s) (75 mL/Hr) IV Continuous <Continuous>  folic acid 1 milliGRAM(s) Oral daily  NIFEdipine XL 30 milliGRAM(s) Oral daily  pantoprazole    Tablet 40 milliGRAM(s) Oral before breakfast  polyethylene glycol 3350 17 Gram(s) Oral daily  thiamine 100 milliGRAM(s) Oral daily    MEDICATIONS  (PRN):  aluminum hydroxide/magnesium hydroxide/simethicone Suspension 30 milliLiter(s) Oral every 4 hours PRN Dyspepsia  melatonin 3 milliGRAM(s) Oral at bedtime PRN Insomnia  ondansetron Injectable 4 milliGRAM(s) IV Push every 8 hours PRN Nausea and/or Vomiting  oxyCODONE    IR 15 milliGRAM(s) Oral every 4 hours PRN Severe Pain (7 - 10)    Allergies    Tylenol (Unknown)    Intolerances      REVIEW OF SYSTEMS:  All other systems reviewed and are negative    Vital Signs Last 24 Hrs  T(C): 36.6 (01 Sep 2022 11:41), Max: 36.7 (31 Aug 2022 17:30)  T(F): 97.8 (01 Sep 2022 11:41), Max: 98 (31 Aug 2022 17:30)  HR: 86 (01 Sep 2022 11:41) (86 - 87)  BP: 110/71 (01 Sep 2022 11:41) (110/71 - 147/81)  BP(mean): --  RR: 18 (01 Sep 2022 11:41) (18 - 18)  SpO2: 97% (01 Sep 2022 11:41) (97% - 98%)    Parameters below as of 01 Sep 2022 11:41  Patient On (Oxygen Delivery Method): room air      Daily     Daily   I&O's Summary    31 Aug 2022 07:01  -  01 Sep 2022 07:00  --------------------------------------------------------  IN: 0 mL / OUT: 700 mL / NET: -700 mL    01 Sep 2022 07:01  -  01 Sep 2022 16:47  --------------------------------------------------------  IN: 0 mL / OUT: 300 mL / NET: -300 mL      CAPILLARY BLOOD GLUCOSE        PHYSICAL EXAM:  GENERAL: NAD, well-groomed, well-developed  HEAD:  Atraumatic, Normocephalic  EYES: EOMI, PERRLA, conjunctiva and sclera clear  ENMT: No tonsillar erythema, exudates, or enlargement; Moist mucous membranes, Good dentition, No lesions  NECK: Supple, No JVD, Normal thyroid  CHEST/LUNG: Clear to percussion bilaterally; No rales, rhonchi, wheezing, or rubs  HEART: Regular rate and rhythm; No murmurs, rubs, or gallops  ABDOMEN: Soft, mildly tender, distended  EXTREMITIES:  2+ Peripheral Pulses, No clubbing, cyanosis, or edema  LYMPH: No lymphadenopathy noted  SKIN: No rashes or lesions    Labs                          9.3    7.91  )-----------( 234      ( 01 Sep 2022 08:58 )             29.8     09-    140  |  116<H>  |  83<H>  ----------------------------<  154<H>  4.8   |  16<L>  |  3.84<H>    Ca    7.6<L>      01 Sep 2022 08:58    TPro  5.8<L>  /  Alb  2.4<L>  /  TBili  0.1<L>  /  DBili  x   /  AST  25  /  ALT  29  /  AlkPhos  69  09-    PT/INR - ( 30 Aug 2022 22:10 )   PT: 11.1 sec;   INR: 0.93 ratio         PTT - ( 30 Aug 2022 22:10 )  PTT:26.4 sec      Urinalysis Basic - ( 31 Aug 2022 02:00 )    Color: Yellow / Appearance: Clear / S.015 / pH: x  Gluc: x / Ketone: Negative  / Bili: Negative / Urobili: Negative mg/dL   Blood: x / Protein: 30 mg/dL / Nitrite: Negative   Leuk Esterase: Trace / RBC: 3-5 /HPF / WBC 6-10   Sq Epi: x / Non Sq Epi: Few / Bacteria: Few        Culture - Urine (collected 31 Aug 2022 02:00)  Source: Clean Catch Clean Catch (Midstream)  Final Report (01 Sep 2022 14:51):    >=3 organisms. Probable collection contamination.            
                          Patient: IMELDA ROBERTSON [id number] 61y Male                            Hospitalist Attending Note      Reports chronic pain, controlled.   Tolerating po intake.   Denies new complaints.    ____________________PHYSICAL EXAM:  GENERAL:  NAD Alert and Oriented x 3   HEENT: NCAT  CARDIOVASCULAR:  S1, S2  LUNGS: CTAB  ABDOMEN:  soft, (-) tenderness, (-) distension, (+) bowel sounds, (-) guarding, (-) rebound (-) rigidity  EXTREMITIES:  no cyanosis / clubbing / edema.   ____________________    VITALS:  Vital Signs Last 24 Hrs  T(C): 36.1 (04 Sep 2022 11:39), Max: 36.9 (03 Sep 2022 15:58)  T(F): 97 (04 Sep 2022 11:39), Max: 98.5 (03 Sep 2022 15:58)  HR: 89 (04 Sep 2022 11:39) (85 - 92)  BP: 135/79 (04 Sep 2022 11:39) (135/79 - 151/83)  BP(mean): --  RR: 18 (04 Sep 2022 11:39) (18 - 18)  SpO2: 98% (04 Sep 2022 11:39) (98% - 100%)    Parameters below as of 04 Sep 2022 11:39  Patient On (Oxygen Delivery Method): room air     Daily     Daily   CAPILLARY BLOOD GLUCOSE        I&O's Summary      LABS:                        9.3    8.19  )-----------( 234      ( 03 Sep 2022 10:10 )             29.3     09-04    136  |  114<H>  |  68<H>  ----------------------------<  138<H>  5.5<H>   |  17<L>  |  3.25<H>    Ca    8.2<L>      04 Sep 2022 07:10                    MEDICATIONS:  aluminum hydroxide/magnesium hydroxide/simethicone Suspension 30 milliLiter(s) Oral every 4 hours PRN  cyclobenzaprine 5 milliGRAM(s) Oral three times a day  folic acid 1 milliGRAM(s) Oral daily  heparin   Injectable 5000 Unit(s) SubCutaneous every 12 hours  influenza   Vaccine 0.5 milliLiter(s) IntraMuscular once  melatonin 3 milliGRAM(s) Oral at bedtime PRN  NIFEdipine XL 30 milliGRAM(s) Oral daily  ondansetron Injectable 4 milliGRAM(s) IV Push every 8 hours PRN  oxyCODONE    IR 15 milliGRAM(s) Oral every 4 hours PRN  pantoprazole    Tablet 40 milliGRAM(s) Oral before breakfast  polyethylene glycol 3350 17 Gram(s) Oral daily  thiamine 100 milliGRAM(s) Oral daily    
                          Patient: IMELDA ROBERTSON [id number] 61y Male                            Hospitalist Attending Note      Reports chronic pain, controlled.   Tolerating po intake.   Denies new complaints.    ____________________PHYSICAL EXAM:  GENERAL:  NAD Alert and Oriented x 3   HEENT: NCAT  CARDIOVASCULAR:  S1, S2  LUNGS: CTAB  ABDOMEN:  soft, (-) tenderness, (-) distension, (+) bowel sounds, (-) guarding, (-) rebound (-) rigidity  EXTREMITIES:  no cyanosis / clubbing / edema.   ____________________    VITALS:  Vital Signs Last 24 Hrs  T(C): 37 (03 Sep 2022 11:26), Max: 37.1 (02 Sep 2022 17:15)  T(F): 98.6 (03 Sep 2022 11:26), Max: 98.8 (02 Sep 2022 17:15)  HR: 80 (03 Sep 2022 11:26) (80 - 92)  BP: 144/81 (03 Sep 2022 11:26) (113/74 - 148/82)  BP(mean): --  RR: 17 (03 Sep 2022 11:26) (17 - 18)  SpO2: 98% (03 Sep 2022 11:26) (98% - 100%)    Parameters below as of 03 Sep 2022 11:26  Patient On (Oxygen Delivery Method): room air     Daily     Daily   CAPILLARY BLOOD GLUCOSE        I&O's Summary    02 Sep 2022 07:01  -  03 Sep 2022 07:00  --------------------------------------------------------  IN: 1225 mL / OUT: 600 mL / NET: 625 mL        LABS:                        9.3    8.19  )-----------( 234      ( 03 Sep 2022 10:10 )             29.3     09-03    142  |  119<H>  |  65<H>  ----------------------------<  123<H>  5.3   |  17<L>  |  3.35<H>    Ca    8.3<L>      03 Sep 2022 10:10                    MEDICATIONS:  aluminum hydroxide/magnesium hydroxide/simethicone Suspension 30 milliLiter(s) Oral every 4 hours PRN  cyclobenzaprine 5 milliGRAM(s) Oral three times a day  folic acid 1 milliGRAM(s) Oral daily  heparin   Injectable 5000 Unit(s) SubCutaneous every 12 hours  influenza   Vaccine 0.5 milliLiter(s) IntraMuscular once  melatonin 3 milliGRAM(s) Oral at bedtime PRN  NIFEdipine XL 30 milliGRAM(s) Oral daily  ondansetron Injectable 4 milliGRAM(s) IV Push every 8 hours PRN  oxyCODONE    IR 15 milliGRAM(s) Oral every 4 hours PRN  pantoprazole    Tablet 40 milliGRAM(s) Oral before breakfast  polyethylene glycol 3350 17 Gram(s) Oral daily  thiamine 100 milliGRAM(s) Oral daily    
[facility] NEPHROLOGY SERVICES, [facility]  NEPHROLOGY AND HYPERTENSION  [address]  [address]  [place] [address]  [phone]    MD HALIE OLIVIA, MD DHRUV REMY, MD ROGERIO ESPOSITO, MD SHERON JUAREZ, MD JANICE ARREOLA MD          Patient events noted  no distress    MEDICATIONS  (STANDING):  cyclobenzaprine 5 milliGRAM(s) Oral three times a day  dextrose 5% + sodium chloride 0.9%. 1000 milliLiter(s) (75 mL/Hr) IV Continuous <Continuous>  folic acid 1 milliGRAM(s) Oral daily  heparin   Injectable 5000 Unit(s) SubCutaneous every 12 hours  influenza   Vaccine 0.5 milliLiter(s) IntraMuscular once  NIFEdipine XL 30 milliGRAM(s) Oral daily  pantoprazole    Tablet 40 milliGRAM(s) Oral before breakfast  polyethylene glycol 3350 17 Gram(s) Oral daily  thiamine 100 milliGRAM(s) Oral daily    MEDICATIONS  (PRN):  aluminum hydroxide/magnesium hydroxide/simethicone Suspension 30 milliLiter(s) Oral every 4 hours PRN Dyspepsia  melatonin 3 milliGRAM(s) Oral at bedtime PRN Insomnia  ondansetron Injectable 4 milliGRAM(s) IV Push every 8 hours PRN Nausea and/or Vomiting  oxyCODONE    IR 15 milliGRAM(s) Oral every 4 hours PRN Severe Pain (7 - 10)      09-01-22 @ 07:01  -  09-02-22 @ 07:00  --------------------------------------------------------  IN: 1050 mL / OUT: 1050 mL / NET: 0 mL    09-02-22 @ 07:01  -  09-02-22 @ 22:12  --------------------------------------------------------  IN: 900 mL / OUT: 0 mL / NET: 900 mL      PHYSICAL EXAM:      T(C): 37.1 (09-02-22 @ 17:15), Max: 37.1 (09-02-22 @ 17:15)  HR: 92 (09-02-22 @ 17:15) (76 - 94)  BP: 148/82 (09-02-22 @ 17:15) (128/75 - 148/82)  RR: 18 (09-02-22 @ 17:15) (18 - 18)  SpO2: 100% (09-02-22 @ 17:15) (99% - 100%)  Wt(kg): --  Lungs clear  Heart S1S2  Abd soft NT ND  Extremities:   tr edema                                    9.3    7.91  )-----------( 234      ( 01 Sep 2022 08:58 )             29.8     09-02    140  |  118<H>  |  76<H>  ----------------------------<  122<H>  5.4<H>   |  17<L>  |  3.33<H>    Ca    7.9<L>      02 Sep 2022 07:55    TPro  5.8<L>  /  Alb  2.4<L>  /  TBili  0.1<L>  /  DBili  x   /  AST  25  /  ALT  29  /  AlkPhos  69  09-01      LIVER FUNCTIONS - ( 01 Sep 2022 08:58 )  Alb: 2.4 g/dL / Pro: 5.8 gm/dL / ALK PHOS: 69 U/L / ALT: 29 U/L / AST: 25 U/L / GGT: x           Creatinine Trend: 3.33<--, 3.84<--, 3.92<--    Trend Bun/Cr  09-02-22 @ 07:55  BUN/CR -  76<H> / 3.33<H>  09-01-22 @ 08:58  BUN/CR -  83<H> / 3.84<H>  08-30-22 @ 22:10  BUN/CR -  108<H> / 3.92<H>  07-25-22 @ 06:00  BUN/CR -  36<H> / 2.22<H>  07-24-22 @ 07:21  BUN/CR -  38<H> / 2.21<H>  07-23-22 @ 10:10  BUN/CR -  36<H> / 2.23<H>  07-22-22 @ 23:00  BUN/CR -  36<H> / 2.21<H>  07-22-22 @ 06:47  BUN/CR -  42<H> / 2.28<H>  07-22-22 @ 00:35  BUN/CR -  41<H> / 2.31<H>  07-21-22 @ 16:05  BUN/CR -  42<H> / 2.34<H>  07-21-22 @ 06:15  BUN/CR -  43<H> / 2.27<H>  07-21-22 @ 03:07  BUN/CR -  43<H> / 2.20<H>      Assessment   YUNIEL CKD 3 ; pre renal azotemia; hyperkalemia ;     Plan:  IV bicarbonate fluid  Medical rx K    Austin Dukes MD
[facility] NEPHROLOGY SERVICES, [facility]  NEPHROLOGY AND HYPERTENSION  [address]  [address]  [place] [address]  [phone]    MD HALIE OLIVIA MD ANDREY GONCHARUK, MD MADHU KORRAPATI, MD YELENA ROSENBERG, MD SHERON JUAREZ, MD JANICE ARREOLA MD          Patient events noted  No acute distress    MEDICATIONS  (STANDING):  cyclobenzaprine 5 milliGRAM(s) Oral three times a day  folic acid 1 milliGRAM(s) Oral daily  heparin   Injectable 5000 Unit(s) SubCutaneous every 12 hours  influenza   Vaccine 0.5 milliLiter(s) IntraMuscular once  NIFEdipine XL 30 milliGRAM(s) Oral daily  pantoprazole    Tablet 40 milliGRAM(s) Oral before breakfast  polyethylene glycol 3350 17 Gram(s) Oral daily  thiamine 100 milliGRAM(s) Oral daily    MEDICATIONS  (PRN):  aluminum hydroxide/magnesium hydroxide/simethicone Suspension 30 milliLiter(s) Oral every 4 hours PRN Dyspepsia  melatonin 3 milliGRAM(s) Oral at bedtime PRN Insomnia  ondansetron Injectable 4 milliGRAM(s) IV Push every 8 hours PRN Nausea and/or Vomiting  oxyCODONE    IR 15 milliGRAM(s) Oral every 4 hours PRN Severe Pain (7 - 10)      09-04-22 @ 07:01  -  09-05-22 @ 07:00  --------------------------------------------------------  IN: 600 mL / OUT: 700 mL / NET: -100 mL      PHYSICAL EXAM:      T(C): 36.7 (09-05-22 @ 11:21), Max: 36.9 (09-05-22 @ 05:14)  HR: 91 (09-05-22 @ 11:21) (84 - 91)  BP: 133/81 (09-05-22 @ 11:21) (120/77 - 138/80)  RR: 17 (09-05-22 @ 11:21) (17 - 18)  SpO2: 97% (09-05-22 @ 11:21) (97% - 100%)  Wt(kg): --  Lungs clear  Heart S1S2  Abd soft NT ND  Extremities:   1 edema                09-05    136  |  110<H>  |  61<H>  ----------------------------<  120<H>  5.2   |  20<L>  |  3.38<H>    Ca    8.3<L>      05 Sep 2022 06:06      Bladder PVR 0 yesterday    Creatinine Trend: 3.38<--, 3.25<--, 3.35<--, 3.33<--, 3.84<--, 3.92<--    Basic Metabolic Panel  July 2022    Creatinine, Serum: 2.22 mg/dL          Assessment  YUNIEL CKD 3; pre renal azotemia, suspected ATN as creatinine radha 3.3; baseline 2.2.      Plan  IVF bicarbonate fluid;  PO transition;   Distal diuretic maintenance   Discharge today, follow up with PMD 1-2 weeks     Austin Dukes MD
[facility] NEPHROLOGY SERVICES, [facility]  NEPHROLOGY AND HYPERTENSION  [address]  [address]  [place] [address]  [phone]    MD HALIE JOHN MD ANDREY GONCHARUK, MD MADHU KORRAPATI, MD YELENA ROSENBERG, MD SHERON JUAREZ, MD JANICE ARREOLA MD          Patient events noted  no new complaints     MEDICATIONS  (STANDING):  cyclobenzaprine 5 milliGRAM(s) Oral three times a day  dextrose 5% 1000 milliLiter(s) (100 mL/Hr) IV Continuous <Continuous>  folic acid 1 milliGRAM(s) Oral daily  heparin   Injectable 5000 Unit(s) SubCutaneous every 12 hours  influenza   Vaccine 0.5 milliLiter(s) IntraMuscular once  NIFEdipine XL 30 milliGRAM(s) Oral daily  pantoprazole    Tablet 40 milliGRAM(s) Oral before breakfast  polyethylene glycol 3350 17 Gram(s) Oral daily  thiamine 100 milliGRAM(s) Oral daily    MEDICATIONS  (PRN):  aluminum hydroxide/magnesium hydroxide/simethicone Suspension 30 milliLiter(s) Oral every 4 hours PRN Dyspepsia  melatonin 3 milliGRAM(s) Oral at bedtime PRN Insomnia  ondansetron Injectable 4 milliGRAM(s) IV Push every 8 hours PRN Nausea and/or Vomiting  oxyCODONE    IR 15 milliGRAM(s) Oral every 4 hours PRN Severe Pain (7 - 10)      09-04-22 @ 07:01  -  09-04-22 @ 22:48  --------------------------------------------------------  IN: 120 mL / OUT: 0 mL / NET: 120 mL      PHYSICAL EXAM:      T(C): 36.4 (09-04-22 @ 16:26), Max: 36.7 (09-03-22 @ 23:50)  HR: 89 (09-04-22 @ 16:26) (85 - 89)  BP: 136/82 (09-04-22 @ 16:26) (135/79 - 149/89)  RR: 18 (09-04-22 @ 16:26) (18 - 18)  SpO2: 99% (09-04-22 @ 16:26) (98% - 100%)  Wt(kg): --  Lungs clear  Heart S1S2  Abd soft NT ND  Extremities:   tr edema                                    9.3    8.19  )-----------( 234      ( 03 Sep 2022 10:10 )             29.3     09-04    136  |  114<H>  |  68<H>  ----------------------------<  138<H>  5.5<H>   |  17<L>  |  3.25<H>    Ca    8.2<L>      04 Sep 2022 07:10          Creatinine Trend: 3.25<--, 3.35<--, 3.33<--, 3.84<--, 3.92<--          Assessment   YUNIEL CKD 3 ; pre renal azotemia; hyperkalemia ;     Plan:  IV bicarbonate fluid while admitted then transition to po  Follow bladder scan PVR  Medical rx K      MD Austin John MD
[facility] NEPHROLOGY SERVICES, [facility]  NEPHROLOGY AND HYPERTENSION  [address]  [address]  [place] [address]  [phone]    MD HALIE OLIVIA MD ANDREY GONCHARUK, MD MADHU KORRAPATI, MD YELENA ROSENBERG, MD SHERON JUAREZ, MD JANICE ARREOLA MD          Patient events noted  no distress    MEDICATIONS  (STANDING):  cyclobenzaprine 5 milliGRAM(s) Oral three times a day  folic acid 1 milliGRAM(s) Oral daily  heparin   Injectable 5000 Unit(s) SubCutaneous every 12 hours  influenza   Vaccine 0.5 milliLiter(s) IntraMuscular once  NIFEdipine XL 30 milliGRAM(s) Oral daily  pantoprazole    Tablet 40 milliGRAM(s) Oral before breakfast  polyethylene glycol 3350 17 Gram(s) Oral daily  thiamine 100 milliGRAM(s) Oral daily    MEDICATIONS  (PRN):  aluminum hydroxide/magnesium hydroxide/simethicone Suspension 30 milliLiter(s) Oral every 4 hours PRN Dyspepsia  melatonin 3 milliGRAM(s) Oral at bedtime PRN Insomnia  ondansetron Injectable 4 milliGRAM(s) IV Push every 8 hours PRN Nausea and/or Vomiting  oxyCODONE    IR 15 milliGRAM(s) Oral every 4 hours PRN Severe Pain (7 - 10)      09-02-22 @ 07:01  -  09-03-22 @ 07:00  --------------------------------------------------------  IN: 1225 mL / OUT: 600 mL / NET: 625 mL      PHYSICAL EXAM:      T(C): 36.9 (09-03-22 @ 15:58), Max: 37 (09-03-22 @ 11:26)  HR: 92 (09-03-22 @ 15:58) (80 - 92)  BP: 151/83 (09-03-22 @ 15:58) (113/74 - 151/83)  RR: 18 (09-03-22 @ 15:58) (17 - 18)  SpO2: 100% (09-03-22 @ 15:58) (98% - 100%)  Wt(kg): --  Lungs clear  Heart S1S2  Abd soft NT ND  Extremities:   tr edema                                    9.3    8.19  )-----------( 234      ( 03 Sep 2022 10:10 )             29.3     09-03    142  |  119<H>  |  65<H>  ----------------------------<  123<H>  5.3   |  17<L>  |  3.35<H>    Ca    8.3<L>      03 Sep 2022 10:10          Creatinine Trend: 3.35<--, 3.33<--, 3.84<--, 3.92<--      Assessment   YUNIEL CKD 3 ; pre renal azotemia; hyperkalemia ;     Plan:  IV bicarbonate fluid  Medical rx K      Austin Dukes MD
                          Patient: IMELDA ROBERTSON [id number] 61y Male                            Hospitalist Attending Note      Reports chronic pain, controlled.   Tolerating po intake.   Denies new complaints.    ____________________PHYSICAL EXAM:  GENERAL:  NAD Alert and Oriented x 3   HEENT: NCAT  CARDIOVASCULAR:  S1, S2  LUNGS: CTAB  ABDOMEN:  soft, (-) tenderness, (-) distension, (+) bowel sounds, (-) guarding, (-) rebound (-) rigidity  EXTREMITIES:  no cyanosis / clubbing / edema.   ____________________     VITALS:  Vital Signs Last 24 Hrs  T(C): 36.7 (02 Sep 2022 04:40), Max: 36.8 (01 Sep 2022 23:34)  T(F): 98 (02 Sep 2022 04:40), Max: 98.3 (01 Sep 2022 23:34)  HR: 92 (02 Sep 2022 14:01) (76 - 94)  BP: 129/76 (02 Sep 2022 14:01) (128/75 - 157/83)  BP(mean): --  RR: 18 (02 Sep 2022 09:48) (18 - 18)  SpO2: 99% (02 Sep 2022 09:48) (98% - 99%)    Parameters below as of 02 Sep 2022 09:48  Patient On (Oxygen Delivery Method): room air     Daily     Daily   CAPILLARY BLOOD GLUCOSE        I&O's Summary    01 Sep 2022 07:01  -  02 Sep 2022 07:00  --------------------------------------------------------  IN: 1050 mL / OUT: 1050 mL / NET: 0 mL        HISTORY:  PAST MEDICAL & SURGICAL HISTORY:  ETOH abuse  sober x1 year approximately      Pancreatitis      Hepatitis C  treated      Gout      HTN (hypertension)      Chronic kidney disease (CKD)      H/O chronic pancreatitis      Gout      Alcohol abuse      Gastric perforation      Allergies    Tylenol (Unknown)    Intolerances       LABS:                        9.3    7.91  )-----------( 234      ( 01 Sep 2022 08:58 )             29.8       Culture - Urine (collected 31 Aug 2022 02:00)  Source: Clean Catch Clean Catch (Midstream)  Final Report (01 Sep 2022 14:51):    >=3 organisms. Probable collection contamination.    09-02    140  |  118<H>  |  76<H>  ----------------------------<  122<H>  5.4<H>   |  17<L>  |  3.33<H>    Ca    7.9<L>      02 Sep 2022 07:55    TPro  5.8<L>  /  Alb  2.4<L>  /  TBili  0.1<L>  /  DBili  x   /  AST  25  /  ALT  29  /  AlkPhos  69  09-01      LIVER FUNCTIONS - ( 01 Sep 2022 08:58 )  Alb: 2.4 g/dL / Pro: 5.8 gm/dL / ALK PHOS: 69 U/L / ALT: 29 U/L / AST: 25 U/L / GGT: x                 Culture - Urine (collected 31 Aug 2022 02:00)  Source: Clean Catch Clean Catch (Midstream)  Final Report (01 Sep 2022 14:51):    >=3 organisms. Probable collection contamination.          MEDICATIONS:  MEDICATIONS  (STANDING):  cyclobenzaprine 5 milliGRAM(s) Oral three times a day  dextrose 5% + sodium chloride 0.9%. 1000 milliLiter(s) (75 mL/Hr) IV Continuous <Continuous>  folic acid 1 milliGRAM(s) Oral daily  influenza   Vaccine 0.5 milliLiter(s) IntraMuscular once  NIFEdipine XL 30 milliGRAM(s) Oral daily  pantoprazole    Tablet 40 milliGRAM(s) Oral before breakfast  polyethylene glycol 3350 17 Gram(s) Oral daily  thiamine 100 milliGRAM(s) Oral daily    MEDICATIONS  (PRN):  aluminum hydroxide/magnesium hydroxide/simethicone Suspension 30 milliLiter(s) Oral every 4 hours PRN Dyspepsia  melatonin 3 milliGRAM(s) Oral at bedtime PRN Insomnia  ondansetron Injectable 4 milliGRAM(s) IV Push every 8 hours PRN Nausea and/or Vomiting  oxyCODONE    IR 15 milliGRAM(s) Oral every 4 hours PRN Severe Pain (7 - 10)  
no complaints of pain, discomfort, fever, chills, nausea, vomitting

## 2022-09-05 NOTE — DISCHARGE NOTE NURSING/CASE MANAGEMENT/SOCIAL WORK - PATIENT PORTAL LINK FT
You can access the FollowMyHealth Patient Portal offered by [facility] by registering at the following website: [email or web address]. By joining [employer]’s FollowMyHealth portal, you will also be able to view your health information using other applications (apps) compatible with our system.

## 2022-09-05 NOTE — DISCHARGE NOTE NURSING/CASE MANAGEMENT/SOCIAL WORK - NSDCVIVACCINE_GEN_ALL_CORE_FT
Tdap; 30-Aug-2022 22:32; Rusty Stratton (RN); [employer]; [id number] (Exp. Date: 29-Nov-2024); IntraMuscular; Deltoid Left.; 0.5 milliLiter(s); VIS (VIS Published: 09-May-2013, VIS Presented: 30-Aug-2022);

## 2022-09-05 NOTE — PROGRESS NOTE ADULT - NUTRITIONAL ASSESSMENT
This patient has been assessed with a concern for Malnutrition and has been determined to have a diagnosis/diagnoses of Severe protein-calorie malnutrition and Underweight (BMI < 19).    This patient is being managed with:   Diet Soft and Bite Sized-  Low Fat (LOWFAT)  No Concentrated Potassium  Low Sodium  Supplement Feeding Modality:  Oral  Suplena Cans or Servings Per Day:  1       Frequency:  Two Times a day  Entered: Sep  3 2022 10:51AM    Diet Soft and Bite Sized-  Entered: Aug 31 2022  8:45AM    The following pending diet order is being considered for treatment of Severe protein-calorie malnutrition and Underweight (BMI < 19):null
This patient has been assessed with a concern for Malnutrition and has been determined to have a diagnosis/diagnoses of Severe protein-calorie malnutrition and Underweight (BMI < 19).    This patient is being managed with:   Diet Soft and Bite Sized-  Low Fat (LOWFAT)  No Concentrated Potassium  Low Sodium  Supplement Feeding Modality:  Oral  Suplena Cans or Servings Per Day:  1       Frequency:  Two Times a day  Entered: Sep  3 2022 10:51AM    
This patient has been assessed with a concern for Malnutrition and has been determined to have a diagnosis/diagnoses of Severe protein-calorie malnutrition and Underweight (BMI < 19).    This patient is being managed with:   Diet Soft and Bite Sized-  Low Fat (LOWFAT)  No Concentrated Potassium  Low Sodium  Supplement Feeding Modality:  Oral  Suplena Cans or Servings Per Day:  1       Frequency:  Two Times a day  Entered: Sep  3 2022 10:51AM

## 2022-09-05 NOTE — PROGRESS NOTE ADULT - ASSESSMENT
62 yo male w/ pmhx of chronic pancreatitis s/p pusestow procedure (complicated by chronic pain of which secondary to alcohol abuse), CKD4, hep C s/p SVR and benign esophageal stricture (post-stent 4/19/22 c/b need for CRE balloon dilation, reposition and suture on 4/29) admitted to [facility] for mechnical fall secondary to presumptive syncope, likely secondary to dehydration from YUNIEL    # yuniel on CKD Stage 3b, suspected ATN - likely secondary to dehydration - creatinine 2.22 a month ago.  Improving.  Monitor BMP. Monitor K in presence of likely metabolic acidosis due to YUNIEL. Discussed with Nephrology.  Stable for outpatient f/u.    # mechanical fall, possible syncope - trauma work up (CT Brain/facial/chest/abd/pelvis)- negative - possibly secondary to syncope from dehydration.  Seen by cardiology.  Seen by PT, recommending CARMEN.  Pt refuses.    # leukocytosis - due to above.  Normalized.  Off antibiotics.  No s/s of infection.  # esophgeal stent - placed in april 2022 - to be removed in 1-2 months - pt lost to outpt follow up - discussed w/ GI- pt to follow up with original GI for stent removal  # Chronic Pain - unchanged, pt reports associated with chronic pancreatitis.  ISTOP reviewed, on Oxycodone 5mg - 12 tabs daily last RX 8/10.  Will continue Oxycodone for now.  Discussed outpatient Pain management referral.    # sp puestow procedure - seen by Surgery, recommended outpatient surgical followup.  # Inpatient DVT prophylaxis - subcutaneous Heparin

## 2022-09-05 NOTE — DISCHARGE NOTE PROVIDER - HOSPITAL COURSE
62 yo male w/ pmhx of chronic pancreatitis s/p pusestow procedure (complicated by chronic pain of which secondary to alcohol abuse), CKD4, hep C s/p SVR and benign esophageal stricture (post-stent 4/19/22 c/b need for CRE balloon dilation, reposition and suture on 4/29) admitted to [facility] for mechnical fall secondary to presumptive syncope, likely secondary to dehydration from YUNIEL    # yuniel on CKD Stage 3b, suspected ATN - likely secondary to dehydration - creatinine 2.22 a month ago.  Improving.  Monitor BMP. Monitor K in presence of likely metabolic acidosis due to YUNIEL. Discussed with Nephrology.  Stable for outpatient f/u.    # Metabolic Encephalopathy, mechanical fall, possible syncope - trauma work up (CT Brain/facial/chest/abd/pelvis)- negative - possibly secondary to syncope / metabolic encephalopathy from dehydration.  Seen by cardiology.  Seen by PT, recommending CARMEN.  Pt refuses.  Mental status improved.    # leukocytosis - due to above.  Normalized.  Off antibiotics.  No s/s of infection.  # esophgeal stent - placed in april 2022 - to be removed in 1-2 months - pt lost to outpt follow up - discussed w/ GI- pt to follow up with original GI for stent removal  # Chronic Pain - unchanged, pt reports associated with chronic pancreatitis.  ISTOP reviewed, on Oxycodone 5mg - 12 tabs daily last RX 8/10.  Will continue Oxycodone for now.  Discussed outpatient Pain management referral.    # sp puestow procedure - seen by Surgery, recommended outpatient surgical followup.  # Inpatient DVT prophylaxis - subcutaneous Heparin       Disposition: Stable for discharge.  Outpatient followup discussed.  Total time spent on discharge is  45 minutes.

## 2022-09-05 NOTE — DISCHARGE NOTE PROVIDER - NSDCMRMEDTOKEN_GEN_ALL_CORE_FT
bisacodyl 5 mg oral delayed release tablet: 1 tab(s) orally once a day (at bedtime)  cyclobenzaprine 5 mg oral tablet: 1 tab(s) orally 3 times a day, As needed, hiccuping MDD:15mg  folic acid 1 mg oral tablet: 1 tab(s) orally once a day  MiraLax oral powder for reconstitution: 17 gram(s) orally 2 times a day  NIFEdipine 30 mg oral tablet, extended release: 1 tab(s) orally once a day  oxyCODONE 5 mg oral tablet: 3 tab(s) orally every 6 hours  pantoprazole 40 mg oral delayed release tablet: 1 tab(s) orally once a day (before a meal)  thiamine 100 mg oral tablet: 1 tab(s) orally once a day

## 2022-09-06 ENCOUNTER — APPOINTMENT (OUTPATIENT)
Dept: INTERNAL MEDICINE | Facility: CLINIC | Age: 62
End: 2022-09-06

## 2022-09-08 DIAGNOSIS — E87.5 HYPERKALEMIA: ICD-10-CM

## 2022-09-08 DIAGNOSIS — K86.0 ALCOHOL-INDUCED CHRONIC PANCREATITIS: ICD-10-CM

## 2022-09-08 DIAGNOSIS — N18.4 CHRONIC KIDNEY DISEASE, STAGE 4 (SEVERE): ICD-10-CM

## 2022-09-08 DIAGNOSIS — F10.11 ALCOHOL ABUSE, IN REMISSION: ICD-10-CM

## 2022-09-08 DIAGNOSIS — N19 UNSPECIFIED KIDNEY FAILURE: ICD-10-CM

## 2022-09-08 DIAGNOSIS — K22.2 ESOPHAGEAL OBSTRUCTION: ICD-10-CM

## 2022-09-08 DIAGNOSIS — R55 SYNCOPE AND COLLAPSE: ICD-10-CM

## 2022-09-08 DIAGNOSIS — Z86.19 PERSONAL HISTORY OF OTHER INFECTIOUS AND PARASITIC DISEASES: ICD-10-CM
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
## 2022-09-08 DIAGNOSIS — I12.9 HYPERTENSIVE CHRONIC KIDNEY DISEASE WITH STAGE 1 THROUGH STAGE 4 CHRONIC KIDNEY DISEASE, OR UNSPECIFIED CHRONIC KIDNEY DISEASE: ICD-10-CM

## 2022-09-08 DIAGNOSIS — Z96.89 PRESENCE OF OTHER SPECIFIED FUNCTIONAL IMPLANTS: ICD-10-CM

## 2022-09-08 DIAGNOSIS — R26.0 ATAXIC GAIT: ICD-10-CM

## 2022-09-08 DIAGNOSIS — A41.9 SEPSIS, UNSPECIFIED ORGANISM: ICD-10-CM
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
## 2022-09-08 DIAGNOSIS — E86.0 DEHYDRATION: ICD-10-CM

## 2022-09-08 DIAGNOSIS — E43 UNSPECIFIED SEVERE PROTEIN-CALORIE MALNUTRITION: ICD-10-CM

## 2022-09-08 DIAGNOSIS — E87.2 ACIDOSIS: ICD-10-CM

## 2022-09-08 DIAGNOSIS — G93.41 METABOLIC ENCEPHALOPATHY: ICD-10-CM

## 2022-09-08 DIAGNOSIS — M10.9 GOUT, UNSPECIFIED: ICD-10-CM

## 2022-09-08 DIAGNOSIS — N17.0 ACUTE KIDNEY FAILURE WITH TUBULAR NECROSIS: ICD-10-CM

## 2022-09-16 ENCOUNTER — APPOINTMENT (OUTPATIENT)
Dept: PAIN MANAGEMENT | Facility: CLINIC | Age: 62
End: 2022-09-16

## 2022-09-20 ENCOUNTER — OUTPATIENT (OUTPATIENT)
Dept: OUTPATIENT SERVICES | Facility: HOSPITAL | Age: 62
LOS: 1 days | End: 2022-09-20

## 2022-09-20 ENCOUNTER — APPOINTMENT (OUTPATIENT)
Dept: INTERNAL MEDICINE | Facility: CLINIC | Age: 62
End: 2022-09-20

## 2022-09-20 VITALS
TEMPERATURE: 97.3 F | HEIGHT: 73 IN | BODY MASS INDEX: 15.64 KG/M2 | SYSTOLIC BLOOD PRESSURE: 134 MMHG | HEART RATE: 98 BPM | WEIGHT: 118 LBS | DIASTOLIC BLOOD PRESSURE: 70 MMHG | OXYGEN SATURATION: 99 %

## 2022-09-20 DIAGNOSIS — K25.5 CHRONIC OR UNSPECIFIED GASTRIC ULCER WITH PERFORATION: Chronic | ICD-10-CM

## 2022-09-20 PROCEDURE — 99213 OFFICE O/P EST LOW 20 MIN: CPT

## 2022-09-26 NOTE — PHYSICAL EXAM
[Normal] : normal rate, regular rhythm, normal S1 and S2 and no murmur heard [de-identified] : Large central scar with surgical staples present.

## 2022-09-26 NOTE — HISTORY OF PRESENT ILLNESS
[FreeTextEntry1] : Pain FU [de-identified] : Patient is a 61 y.o M with PMHx of ETOH abuse (quit about 4-5 years ago), chronic pancreatitis, Hep C (past history of treatment), gout, gastric perf in 2019 (s/p repair), s/p Peustow procedure 7/2022 presenting for pain follow-up.\par \par Patient was called 2 weeks ago due to a missed appointment 2/2 being too late. Discussed pain control with patient who noted pain was somewhat controlled with current dose (2 5mg oxycodone q4h). Patient noted at that time he had a few day of pills left. Refilled ordered to hold patient off until todays visit. Issue occurred with pharmacy where they were "backed up" and he was not given medication. As a result of this patient dissapeared for a while according to his mother who I called on multiple occassiona. Patient notes that did not actually dissapear but was rather in and out of the house when she did not notice. Patient did not end up in the hospital as usual. When asked how he avoided the hospital he said he had no idea. Confirmed on ISTOP patient last dose  was from my prescription on 9/14/22 for 28 day supply. Patient states he was only given 25 day supply. Patient notes PEG scal 10/10 and notes a certain oxycodone pill works best. Discussed that specific pills depend on provider. Patient pain appointment cancelled. When asked patient states he went to visit but was told it was migraine clinic and so he was told to reschedule (new visit in Jan). \par \par Patient requesting help with chronic hiccups. Stated hiccups likely 2/2 chronic pain but will trial PPI/H2 blocker as initial therapy

## 2022-09-27 DIAGNOSIS — K86.1 OTHER CHRONIC PANCREATITIS: ICD-10-CM

## 2022-09-27 DIAGNOSIS — R06.6 HICCOUGH: ICD-10-CM

## 2022-10-11 ENCOUNTER — APPOINTMENT (OUTPATIENT)
Dept: INTERNAL MEDICINE | Facility: CLINIC | Age: 62
End: 2022-10-11

## 2022-10-18 ENCOUNTER — OUTPATIENT (OUTPATIENT)
Dept: OUTPATIENT SERVICES | Facility: HOSPITAL | Age: 62
LOS: 1 days | End: 2022-10-18

## 2022-10-18 ENCOUNTER — APPOINTMENT (OUTPATIENT)
Dept: INTERNAL MEDICINE | Facility: CLINIC | Age: 62
End: 2022-10-18

## 2022-10-18 VITALS
HEART RATE: 56 BPM | WEIGHT: 121 LBS | SYSTOLIC BLOOD PRESSURE: 120 MMHG | HEIGHT: 74 IN | TEMPERATURE: 97.1 F | OXYGEN SATURATION: 100 % | DIASTOLIC BLOOD PRESSURE: 70 MMHG | BODY MASS INDEX: 15.53 KG/M2

## 2022-10-18 DIAGNOSIS — F11.90 OPIOID USE, UNSPECIFIED, UNCOMPLICATED: ICD-10-CM

## 2022-10-18 DIAGNOSIS — K25.5 CHRONIC OR UNSPECIFIED GASTRIC ULCER WITH PERFORATION: Chronic | ICD-10-CM

## 2022-10-18 DIAGNOSIS — R06.6 HICCOUGH: ICD-10-CM

## 2022-10-18 DIAGNOSIS — Z23 ENCOUNTER FOR IMMUNIZATION: ICD-10-CM

## 2022-10-18 PROCEDURE — 99213 OFFICE O/P EST LOW 20 MIN: CPT

## 2022-10-18 NOTE — PHYSICAL EXAM
[Normal] : normal rate, regular rhythm, normal S1 and S2 and no murmur heard [de-identified] : Large central scar with surgical staples present.

## 2022-10-19 ENCOUNTER — APPOINTMENT (OUTPATIENT)
Dept: NEPHROLOGY | Facility: CLINIC | Age: 62
End: 2022-10-19

## 2022-10-19 ENCOUNTER — NON-APPOINTMENT (OUTPATIENT)
Age: 62
End: 2022-10-19

## 2022-10-19 NOTE — ED ADULT NURSE NOTE - GASTROINTESTINAL ASSESSMENT
Chief Complaint   Patient presents with   • Hyperlipidemia     Follow up on labs       Subjective     Chad Marte is a 68 y.o. male being seen for a follow up appointment today regarding Hyperlipidemia. He was switched from Crestor to Lipitor due to muscle aches. He is tolerating this, and he is compliant with dose. He mann CP, SOA, edema. He has bought a home in [place] and has been very active.     He has OA pain for which he takes Mobic 7.5mg daily. He is followed by Dr. Keene. He uses Mobic sparingly.       History of Present Illness     Allergies   Allergen Reactions   • Lipitor [Atorvastatin] Myalgia         Current Outpatient Medications:   •  meloxicam (MOBIC) 7.5 MG tablet, Take 1 tablet by mouth Daily., Disp: 30 tablet, Rfl: 5  •  Multiple Vitamins-Minerals (multivitamin and minerals) liquid liquid, Take  by mouth Daily., Disp: , Rfl:   •  Omega-3 Fatty Acids (fish oil) 1000 MG capsule capsule, Take  by mouth Daily With Breakfast., Disp: , Rfl:   •  rosuvastatin (CRESTOR) 10 MG tablet, TAKE 1 TABLET EVERY NIGHT, Disp: 90 tablet, Rfl: 3  •  TURMERIC PO, Take  by mouth., Disp: , Rfl:   •  Zn-Pyg Afri-Nettle-Saw Palmet (SAW PALMETTO COMPLEX PO), Take  by mouth., Disp: , Rfl:     The following portions of the patient's history were reviewed and updated as appropriate: allergies, current medications, past family history, past medical history, past social history, past surgical history and problem list.    Review of Systems   Constitutional: Negative.    HENT: Negative.    Eyes: Negative.    Respiratory: Negative.    Cardiovascular: Negative.  Negative for chest pain and leg swelling.   Gastrointestinal: Negative.    Endocrine: Negative.    Genitourinary: Negative.    Musculoskeletal: Positive for arthralgias.   All other systems reviewed and are negative.      Assessment     Physical Exam  Vitals reviewed.   Constitutional:       Appearance: Normal appearance.   HENT:      Head: Normocephalic.      Right Ear:  Tympanic membrane normal.      Left Ear: Tympanic membrane normal.      Nose: Nose normal.   Cardiovascular:      Rate and Rhythm: Normal rate and regular rhythm.      Pulses: Normal pulses.      Heart sounds: Normal heart sounds. No murmur heard.  Pulmonary:      Effort: Pulmonary effort is normal. No respiratory distress.      Breath sounds: Normal breath sounds. No stridor.   Musculoskeletal:      Cervical back: Neck supple.      Right lower leg: No edema.      Left lower leg: No edema.      Comments: Diffuse joint enlargement in PIP and DIP joints   Skin:     General: Skin is warm and dry.   Neurological:      General: No focal deficit present.      Mental Status: He is alert and oriented to person, place, and time.   Psychiatric:         Mood and Affect: Mood normal.         Behavior: Behavior normal.         Thought Content: Thought content normal.         Plan     His fasting labs were reviewed with the patient from last week.   Lipid Panel With / Chol / HDL Ratio (10/17/2022 11:02)  Comprehensive Metabolic Panel (07/12/2022 08:59)  Lipid Panel With / Chol / HDL Ratio (07/12/2022 08:59)  PSA Screen (07/12/2022 08:59)     Diagnosis Plan   1. Hyperlipidemia LDL goal <100  rosuvastatin (CRESTOR) 20 MG tablet    LDL not at goal of < 100. Increase Crestor to 20mg nightly      2. Primary osteoarthritis involving multiple joints  meloxicam (MOBIC) 15 MG tablet    acetaminophen (Tylenol 8 Hour) 650 MG 8 hr tablet    Increase Mobic to 15 mg daily. May use Tylenol 650mg q8hrs TID      3. Need for vaccination  Fluzone High-Dose 65+yrs ([id number])        Set up an AWV in 6 months   - - -

## 2022-10-26 NOTE — BH CONSULTATION LIAISON ASSESSMENT NOTE - CURRENT MEDICATION
MEDICATIONS  (STANDING):  bisacodyl 5 milliGRAM(s) Oral at bedtime  folic acid 1 milliGRAM(s) Oral daily  heparin   Injectable 5000 Unit(s) SubCutaneous every 8 hours  pancrelipase  (CREON  6,000 Lipase Units) 2 Capsule(s) Oral three times a day with meals  pantoprazole    Tablet 40 milliGRAM(s) Oral before breakfast  polyethylene glycol 3350 17 Gram(s) Oral two times a day  senna 2 Tablet(s) Oral at bedtime  sodium chloride 0.9%. 1000 milliLiter(s) (90 mL/Hr) IV Continuous <Continuous>  thiamine 100 milliGRAM(s) Oral daily    MEDICATIONS  (PRN):  acetaminophen     Tablet .. 650 milliGRAM(s) Oral every 6 hours PRN Temp greater or equal to 38C (100.4F), Mild Pain (1 - 3), Moderate Pain (4 - 6)  chlorproMAZINE    Tablet 25 milliGRAM(s) Oral every 8 hours PRN hiccups  cyclobenzaprine 5 milliGRAM(s) Oral three times a day PRN Muscle Spasm  oxyCODONE    IR 10 milliGRAM(s) Oral every 4 hours PRN Severe Pain (7 - 10)   DISPLAY PLAN FREE TEXT

## 2022-11-18 NOTE — ED PROVIDER NOTE - CCCP TRG CHIEF CMPLNT
Occupational Therapy Daily Treatment Note     Date: 11/18/2022  Name: Lashell Stroud  Clinic Number: [id number]    Therapy Diagnosis: L hand pain, decreased ROM L thumb, decreased /pinch/ADL  Physician: Stanley Rivero MD     Physician Orders: Please begin therapy to the left hand.  Status post thumb CMC arthroplasty protocol    Frequency:  3 times per week  Duration:  4 weeks       Medical Diagnosis: M18.12 (ICD-10-CM) - Arthritis of carpometacarpal (CMC) joint of left thumb  Evaluation Date: 11/7/2022  Insurance Authorization period Expiration: 12/31/23  Plan of Care Expiration Period: 1/3/23     Visit # / Visits Authorized:  6 / 20  Time In:  1532  Time Out: 1620     Total Billable Time: 47 minutes     Surgical Procedure:   L thumb CMC suspension arthroplasty     Precautions: Standard and Weightbearing and post op precautions     Date of  Surgery: 10/4/22                              S/P: 6 weeks on 11/15/22        SUBJECTIVE     Pt reports:  No new complaints, scar at thumb remains somewhat sensitive     She was compliant with home exercise program given last session.   Response to previous treatment:Good   Functional change: none allowed     Pain:   Functional Pain Scale Rating 0-10:   0/10 now  0/10 at best  2/10 at worst  Location: L wrist, over donor incision, and over area of CMC-1     OBJECTIVE     Not measured this date  Range of Motion:               Left/ Right   Wrist E/F 70/60 70/63  Post heat and AROM on 11/11/2022     Wrist RD/UD 17/28 20/35   Thumb R/P Abd 58/40 43/50   Thumb MP flex 0/40 0/60   Thumb IP flex 0/29 0/45   Thumb Opp 2.3 cm (+0.4)  0 cm   Post heat and AROM on 11/16/2022        Treatment     Lashell received the treatments listed below:     Supervised modalities after being cleared for contradictions for 15 minutes:   - Fluidotherapy 15 min at 115 degrees.    - Not Today: Moist Heat applied to L wrist/hand x 5 min to decrease pain and  increase blood flow and tissue  elasticity prior to treatment.     Lashell received the following manual therapy techniques for 2 minutes: Not Today   -Retrograde massage to L digits/hand/wrist x 1 min to stimulate lymphatics to decrease pain,  edema and increase AROM and functional use.    -Performed scar massage to incisions for 1 minutes to decrease adhesions and improve tensile glide.      Lashell received therapeutic exercises for 10 minutes including:  -gentle PROM thumb IP/MPJ and composite with CMC supported (NT)  -AAROM wrist E/F 1# DB  - Isolated IP flexion, flat thumb and slides down small finger 2x20 each L thumb    Therapeutic activities to improve functional performance for 20 minutes, including:  - Wrist wheel into gentle wrist flexion and ext 2 min each  - isospheres 3 min  - Spikey ball 2 min gentle rolls over palm and scar.  - Dexterciser 5 sets  - in hand manipulation with mancala x 1 set (NT)  - pen walks and rotations 10x (NT) (NT)    Patient Education and Home Exercises      Education provided:   - continue same. Benefits of silicone scar tape.    Written Home Exercises Provided: Patient instructed to cont prior HEP.  Exercises were reviewed and Lashell was able to demonstrate them prior to the end of the session.  Lashell demonstrated good  understanding of the HEP provided. See EMR under Patient Instructions for exercises provided during therapy sessions.       Assessment     Pt would continue to benefit from skilled OT.  Pt continues with some significant car tissue. Recommending silicone scar tape for at least pm use as tolerated in order to further address.  Cont per current POC.    - Progress towards goals: Good ; STG 1 met    Lashell is progressing well towards her goals and there are no updates to goals at this time. Pt prognosis is Good.     Pt will continue to benefit from skilled outpatient occupational therapy to address the deficits listed in the problem list on initial evaluation provide pt/family education and to  maximize pt's level of independence in the home and community environment.     Pt's spiritual, cultural and educational needs considered and pt agreeable to plan of care and goals.    Anticipated barriers to occupational therapy: none    Goals:  Short Term Goals: (4 weeks)  1. Pt will be independent with HEP in 2 visits. (Met 11/9/22)  2. Pt will report pain to a 5-6/10 at worst once therapy has been initiated   3. Pt will increase L wrist flexion AROM by 5-10 degrees to enable dressing, grooming activities.  4. Pt will increase L thumb MANN by 15-20 degrees to enable in hand manipulation.        Long Term Goals: (by discharge)  1. Pt will report decreased pain to 1-2/10 with ADLs.   2. Pt will increase L thumbTAM to 99 degrees to enable independence with self-care and meal preparation.  3. Pt will increase R wrist Flexion  to 55-60 degrees to enable self care.   4. Pt will exhibit opposition to within 1 cm of DPC to allow manipulation of change and small objects.   5. Pt will exhibit L  strength of 60-75% of R hand to allow a firm grasp on cooking utensils, steering wheel, etc.  6. Pt will exhibit 7-9# of L functional pinch strength to allow writing,opening containers, and turning keys.  7. Pt will exhibit a significant increase (30+ points) in the FOTO assessment.  8. Pt will return to PLOF.    PLAN     Certification Period/Plan of care expiration: 11/7/2022 to 1/3/23.    Continue Occupational Therapy 3 times per week x 4 weeks to decrease pain and edema, and increase A/PROM, strength, and functional use of L upper extremity.     Updates/Grading for next session: progress per protocol        SAILAJA Lombardi/MICAH             fall

## 2022-11-22 ENCOUNTER — APPOINTMENT (OUTPATIENT)
Dept: INTERNAL MEDICINE | Facility: CLINIC | Age: 62
End: 2022-11-22

## 2022-11-22 ENCOUNTER — NON-APPOINTMENT (OUTPATIENT)
Age: 62
End: 2022-11-22

## 2022-11-25 ENCOUNTER — INPATIENT (INPATIENT)
Facility: HOSPITAL | Age: 62
LOS: 19 days | Discharge: ANOTHER HOSPITAL | End: 2022-12-15
Attending: STUDENT IN AN ORGANIZED HEALTH CARE EDUCATION/TRAINING PROGRAM | Admitting: STUDENT IN AN ORGANIZED HEALTH CARE EDUCATION/TRAINING PROGRAM

## 2022-11-25 VITALS
SYSTOLIC BLOOD PRESSURE: 88 MMHG | HEART RATE: 45 BPM | OXYGEN SATURATION: 67 % | WEIGHT: 80.03 LBS | DIASTOLIC BLOOD PRESSURE: 50 MMHG | RESPIRATION RATE: 22 BRPM | HEIGHT: 70 IN

## 2022-11-25 DIAGNOSIS — K25.5 CHRONIC OR UNSPECIFIED GASTRIC ULCER WITH PERFORATION: Chronic | ICD-10-CM

## 2022-11-25 LAB
ALBUMIN SERPL ELPH-MCNC: 1.7 G/DL — LOW (ref 3.3–5)
ALBUMIN SERPL ELPH-MCNC: 2.2 G/DL — LOW (ref 3.3–5)
ALP SERPL-CCNC: 1059 U/L — HIGH (ref 40–120)
ALP SERPL-CCNC: 1279 U/L — SIGNIFICANT CHANGE UP (ref 40–120)
ALT FLD-CCNC: 1124 U/L — HIGH (ref 12–78)
ALT FLD-CCNC: 1574 U/L — HIGH (ref 12–78)
AMMONIA BLD-MCNC: 106 UMOL/L — HIGH (ref 11–32)
AMMONIA BLD-MCNC: 316 UMOL/L — HIGH (ref 11–32)
ANION GAP SERPL CALC-SCNC: 17 MMOL/L — SIGNIFICANT CHANGE UP (ref 5–17)
ANION GAP SERPL CALC-SCNC: 18 MMOL/L — HIGH (ref 5–17)
ANION GAP SERPL CALC-SCNC: 19 MMOL/L — HIGH (ref 5–17)
ANISOCYTOSIS BLD QL: SLIGHT — SIGNIFICANT CHANGE UP
APPEARANCE UR: ABNORMAL
APTT BLD: 37.2 SEC — HIGH (ref 27.5–35.5)
AST SERPL-CCNC: 2956 U/L — SIGNIFICANT CHANGE UP (ref 15–37)
AST SERPL-CCNC: 3772 U/L — HIGH (ref 15–37)
BACTERIA # UR AUTO: ABNORMAL
BASE EXCESS BLDA CALC-SCNC: -15.8 MMOL/L — LOW (ref -2–3)
BASE EXCESS BLDA CALC-SCNC: -30.8 MMOL/L — LOW (ref -2–3)
BASOPHILS # BLD AUTO: 0 K/UL — SIGNIFICANT CHANGE UP (ref 0–0.2)
BASOPHILS NFR BLD AUTO: 0 % — SIGNIFICANT CHANGE UP (ref 0–2)
BILIRUB SERPL-MCNC: 0.5 MG/DL — SIGNIFICANT CHANGE UP (ref 0.2–1.2)
BILIRUB SERPL-MCNC: 0.6 MG/DL — SIGNIFICANT CHANGE UP (ref 0.2–1.2)
BILIRUB UR-MCNC: NEGATIVE — SIGNIFICANT CHANGE UP
BLD GP AB SCN SERPL QL: SIGNIFICANT CHANGE UP
BLOOD GAS COMMENTS ARTERIAL: SIGNIFICANT CHANGE UP
BLOOD GAS COMMENTS ARTERIAL: SIGNIFICANT CHANGE UP
BUN SERPL-MCNC: 162 MG/DL — HIGH (ref 7–23)
BUN SERPL-MCNC: 163 MG/DL — HIGH (ref 7–23)
BUN SERPL-MCNC: 167 MG/DL — HIGH (ref 7–23)
CALCIUM SERPL-MCNC: 5 MG/DL — CRITICAL LOW (ref 8.5–10.1)
CALCIUM SERPL-MCNC: 6.1 MG/DL — CRITICAL LOW (ref 8.5–10.1)
CALCIUM SERPL-MCNC: 7.2 MG/DL — LOW (ref 8.5–10.1)
CHLORIDE SERPL-SCNC: 120 MMOL/L — HIGH (ref 96–108)
CHLORIDE SERPL-SCNC: 122 MMOL/L — HIGH (ref 96–108)
CHLORIDE SERPL-SCNC: 124 MMOL/L — HIGH (ref 96–108)
CK MB BLD-MCNC: 8.2 % — HIGH (ref 0–3.5)
CK MB CFR SERPL CALC: 39.9 NG/ML — HIGH (ref 0.5–3.6)
CK SERPL-CCNC: 487 U/L — HIGH (ref 26–308)
CO2 BLDA-SCNC: 11 MMOL/L — LOW (ref 19–24)
CO2 BLDA-SCNC: 4 MMOL/L — LOW (ref 19–24)
CO2 SERPL-SCNC: 10 MMOL/L — CRITICAL LOW (ref 22–31)
CO2 SERPL-SCNC: 11 MMOL/L — LOW (ref 22–31)
CO2 SERPL-SCNC: 5 MMOL/L — CRITICAL LOW (ref 22–31)
COLOR SPEC: YELLOW — SIGNIFICANT CHANGE UP
CREAT SERPL-MCNC: 8 MG/DL — HIGH (ref 0.5–1.3)
CREAT SERPL-MCNC: 8.08 MG/DL — HIGH (ref 0.5–1.3)
CREAT SERPL-MCNC: 8.94 MG/DL — HIGH (ref 0.5–1.3)
DACRYOCYTES BLD QL SMEAR: SLIGHT — SIGNIFICANT CHANGE UP
DIFF PNL FLD: ABNORMAL
EGFR: 6 ML/MIN/1.73M2 — LOW
EGFR: 7 ML/MIN/1.73M2 — LOW
EGFR: 7 ML/MIN/1.73M2 — LOW
EOSINOPHIL # BLD AUTO: 0 K/UL — SIGNIFICANT CHANGE UP (ref 0–0.5)
EOSINOPHIL NFR BLD AUTO: 0 % — SIGNIFICANT CHANGE UP (ref 0–6)
EPI CELLS # UR: SIGNIFICANT CHANGE UP
GAS PNL BLDA: SIGNIFICANT CHANGE UP
GLUCOSE BLDC GLUCOMTR-MCNC: 72 MG/DL — SIGNIFICANT CHANGE UP (ref 70–99)
GLUCOSE BLDC GLUCOMTR-MCNC: 85 MG/DL — SIGNIFICANT CHANGE UP (ref 70–99)
GLUCOSE SERPL-MCNC: 267 MG/DL — HIGH (ref 70–99)
GLUCOSE SERPL-MCNC: 66 MG/DL — LOW (ref 70–99)
GLUCOSE SERPL-MCNC: 96 MG/DL — SIGNIFICANT CHANGE UP (ref 70–99)
GLUCOSE UR QL: NEGATIVE MG/DL — SIGNIFICANT CHANGE UP
GRAM STN FLD: SIGNIFICANT CHANGE UP
GRAM STN FLD: SIGNIFICANT CHANGE UP
HCO3 BLDA-SCNC: 10 MMOL/L — LOW (ref 21–28)
HCO3 BLDA-SCNC: 3 MMOL/L — CRITICAL LOW (ref 21–28)
HCT VFR BLD CALC: 18.2 % — CRITICAL LOW (ref 39–50)
HCT VFR BLD CALC: 25.6 % — LOW (ref 39–50)
HGB BLD-MCNC: 6.3 G/DL — CRITICAL LOW (ref 13–17)
HGB BLD-MCNC: 8.2 G/DL — LOW (ref 13–17)
HIV 1+2 AB+HIV1 P24 AG SERPL QL IA: SIGNIFICANT CHANGE UP
HOROWITZ INDEX BLDA+IHG-RTO: 100 — SIGNIFICANT CHANGE UP
HOROWITZ INDEX BLDA+IHG-RTO: 60 — SIGNIFICANT CHANGE UP
HYPOCHROMIA BLD QL: SLIGHT — SIGNIFICANT CHANGE UP
INR BLD: 1.14 RATIO — SIGNIFICANT CHANGE UP (ref 0.88–1.16)
KETONES UR-MCNC: ABNORMAL
LACTATE SERPL-SCNC: 2.3 MMOL/L — HIGH (ref 0.7–2)
LACTATE SERPL-SCNC: 2.7 MMOL/L — HIGH (ref 0.7–2)
LACTATE SERPL-SCNC: 2.9 MMOL/L — HIGH (ref 0.7–2)
LACTATE SERPL-SCNC: 3.5 MMOL/L — HIGH (ref 0.7–2)
LEUKOCYTE ESTERASE UR-ACNC: ABNORMAL
LIDOCAIN IGE QN: 409 U/L — HIGH (ref 73–393)
LYMPHOCYTES # BLD AUTO: 0.79 K/UL — LOW (ref 1–3.3)
LYMPHOCYTES # BLD AUTO: 10 % — LOW (ref 13–44)
M. MORGANII DNA BLD POS QL NAA+PROBE: SIGNIFICANT CHANGE UP
MACROCYTES BLD QL: SLIGHT — SIGNIFICANT CHANGE UP
MAGNESIUM SERPL-MCNC: 1.7 MG/DL — SIGNIFICANT CHANGE UP (ref 1.6–2.6)
MANUAL SMEAR VERIFICATION: SIGNIFICANT CHANGE UP
MCHC RBC-ENTMCNC: 31.1 PG — SIGNIFICANT CHANGE UP (ref 27–34)
MCHC RBC-ENTMCNC: 31.2 PG — SIGNIFICANT CHANGE UP (ref 27–34)
MCHC RBC-ENTMCNC: 32 G/DL — SIGNIFICANT CHANGE UP (ref 32–36)
MCHC RBC-ENTMCNC: 34.6 G/DL — SIGNIFICANT CHANGE UP (ref 32–36)
MCV RBC AUTO: 90.1 FL — SIGNIFICANT CHANGE UP (ref 80–100)
MCV RBC AUTO: 97 FL — SIGNIFICANT CHANGE UP (ref 80–100)
METHOD TYPE: SIGNIFICANT CHANGE UP
MONOCYTES # BLD AUTO: 0 K/UL — SIGNIFICANT CHANGE UP (ref 0–0.9)
MONOCYTES NFR BLD AUTO: 0 % — LOW (ref 2–14)
MRSA PCR RESULT.: SIGNIFICANT CHANGE UP
NEUTROPHILS # BLD AUTO: 7.07 K/UL — SIGNIFICANT CHANGE UP (ref 1.8–7.4)
NEUTROPHILS NFR BLD AUTO: 84 % — HIGH (ref 43–77)
NEUTS BAND # BLD: 6 % — SIGNIFICANT CHANGE UP (ref 0–8)
NITRITE UR-MCNC: NEGATIVE — SIGNIFICANT CHANGE UP
NRBC # BLD: 12 /100 — HIGH (ref 0–0)
NRBC # BLD: 21 /100 WBCS — HIGH (ref 0–0)
NRBC # BLD: SIGNIFICANT CHANGE UP /100 WBCS (ref 0–0)
NT-PROBNP SERPL-SCNC: HIGH PG/ML (ref 0–125)
OVALOCYTES BLD QL SMEAR: SLIGHT — SIGNIFICANT CHANGE UP
PCO2 BLDA: 19 MMHG — CRITICAL LOW (ref 32–46)
PCO2 BLDA: 25 MMHG — LOW (ref 32–46)
PH BLDA: 6.8 — CRITICAL LOW (ref 7.35–7.45)
PH BLDA: 7.22 — LOW (ref 7.35–7.45)
PH UR: 7 — SIGNIFICANT CHANGE UP (ref 5–8)
PHOSPHATE SERPL-MCNC: 12.1 MG/DL — HIGH (ref 2.5–4.5)
PLAT MORPH BLD: NORMAL — SIGNIFICANT CHANGE UP
PLATELET # BLD AUTO: 108 K/UL — LOW (ref 150–400)
PLATELET # BLD AUTO: 190 K/UL — SIGNIFICANT CHANGE UP (ref 150–400)
PO2 BLDA: 299 MMHG — HIGH (ref 83–108)
PO2 BLDA: 442 MMHG — HIGH (ref 83–108)
POIKILOCYTOSIS BLD QL AUTO: SLIGHT — SIGNIFICANT CHANGE UP
POTASSIUM SERPL-MCNC: 4.6 MMOL/L — SIGNIFICANT CHANGE UP (ref 3.5–5.3)
POTASSIUM SERPL-MCNC: 5.2 MMOL/L — SIGNIFICANT CHANGE UP (ref 3.5–5.3)
POTASSIUM SERPL-MCNC: 7.4 MMOL/L — CRITICAL HIGH (ref 3.5–5.3)
POTASSIUM SERPL-SCNC: 4.6 MMOL/L — SIGNIFICANT CHANGE UP (ref 3.5–5.3)
POTASSIUM SERPL-SCNC: 5.2 MMOL/L — SIGNIFICANT CHANGE UP (ref 3.5–5.3)
POTASSIUM SERPL-SCNC: 7.4 MMOL/L — CRITICAL HIGH (ref 3.5–5.3)
PROT SERPL-MCNC: 4.3 GM/DL — LOW (ref 6–8.3)
PROT SERPL-MCNC: 5.7 GM/DL — LOW (ref 6–8.3)
PROT UR-MCNC: 500 MG/DL
PROTHROM AB SERPL-ACNC: 13.6 SEC — HIGH (ref 10.5–13.4)
RAPID RVP RESULT: SIGNIFICANT CHANGE UP
RBC # BLD: 2.02 M/UL — LOW (ref 4.2–5.8)
RBC # BLD: 2.64 M/UL — LOW (ref 4.2–5.8)
RBC # FLD: 14.4 % — SIGNIFICANT CHANGE UP (ref 10.3–14.5)
RBC # FLD: 15.8 % — HIGH (ref 10.3–14.5)
RBC BLD AUTO: ABNORMAL
RBC CASTS # UR COMP ASSIST: ABNORMAL /HPF (ref 0–4)
S AUREUS DNA NOSE QL NAA+PROBE: DETECTED
SAO2 % BLDA: 97.5 % — SIGNIFICANT CHANGE UP (ref 94–98)
SAO2 % BLDA: 97.8 % — SIGNIFICANT CHANGE UP (ref 94–98)
SARS-COV-2 RNA SPEC QL NAA+PROBE: SIGNIFICANT CHANGE UP
SODIUM SERPL-SCNC: 146 MMOL/L — HIGH (ref 135–145)
SODIUM SERPL-SCNC: 150 MMOL/L — HIGH (ref 135–145)
SODIUM SERPL-SCNC: 150 MMOL/L — HIGH (ref 135–145)
SP GR SPEC: 1.01 — SIGNIFICANT CHANGE UP (ref 1.01–1.02)
SPECIMEN SOURCE: SIGNIFICANT CHANGE UP
SPECIMEN SOURCE: SIGNIFICANT CHANGE UP
TROPONIN I, HIGH SENSITIVITY RESULT: 1407.8 NG/L — HIGH
TROPONIN I, HIGH SENSITIVITY RESULT: 5648 NG/L — HIGH
TROPONIN I, HIGH SENSITIVITY RESULT: 754.3 NG/L — HIGH
UROBILINOGEN FLD QL: NEGATIVE MG/DL — SIGNIFICANT CHANGE UP
WBC # BLD: 7.85 K/UL — SIGNIFICANT CHANGE UP (ref 3.8–10.5)
WBC # BLD: 8.6 K/UL — SIGNIFICANT CHANGE UP (ref 3.8–10.5)
WBC # FLD AUTO: 7.85 K/UL — SIGNIFICANT CHANGE UP (ref 3.8–10.5)
WBC # FLD AUTO: 8.6 K/UL — SIGNIFICANT CHANGE UP (ref 3.8–10.5)
WBC UR QL: >50

## 2022-11-25 PROCEDURE — 74176 CT ABD & PELVIS W/O CONTRAST: CPT | Mod: 26,MA

## 2022-11-25 PROCEDURE — 99285 EMERGENCY DEPT VISIT HI MDM: CPT | Mod: 25

## 2022-11-25 PROCEDURE — 31500 INSERT EMERGENCY AIRWAY: CPT

## 2022-11-25 PROCEDURE — 71045 X-RAY EXAM CHEST 1 VIEW: CPT | Mod: 26

## 2022-11-25 PROCEDURE — 36556 INSERT NON-TUNNEL CV CATH: CPT

## 2022-11-25 PROCEDURE — 93010 ELECTROCARDIOGRAM REPORT: CPT

## 2022-11-25 PROCEDURE — 70450 CT HEAD/BRAIN W/O DYE: CPT | Mod: 26,MA

## 2022-11-25 PROCEDURE — 71250 CT THORAX DX C-: CPT | Mod: 26,MA

## 2022-11-25 PROCEDURE — 99291 CRITICAL CARE FIRST HOUR: CPT

## 2022-11-25 PROCEDURE — 36620 INSERTION CATHETER ARTERY: CPT

## 2022-11-25 RX ORDER — LACTULOSE 10 G/15ML
200 SOLUTION ORAL ONCE
Refills: 0 | Status: DISCONTINUED | OUTPATIENT
Start: 2022-11-25 | End: 2022-11-25

## 2022-11-25 RX ORDER — SODIUM CHLORIDE 9 MG/ML
10 INJECTION INTRAMUSCULAR; INTRAVENOUS; SUBCUTANEOUS
Refills: 0 | Status: DISCONTINUED | OUTPATIENT
Start: 2022-11-25 | End: 2022-01-01

## 2022-11-25 RX ORDER — CHLORHEXIDINE GLUCONATE 213 G/1000ML
15 SOLUTION TOPICAL EVERY 12 HOURS
Refills: 0 | Status: DISCONTINUED | OUTPATIENT
Start: 2022-11-25 | End: 2022-01-01

## 2022-11-25 RX ORDER — CALCIUM GLUCONATE 100 MG/ML
2 VIAL (ML) INTRAVENOUS ONCE
Refills: 0 | Status: COMPLETED | OUTPATIENT
Start: 2022-11-25 | End: 2022-11-25

## 2022-11-25 RX ORDER — SODIUM ZIRCONIUM CYCLOSILICATE 10 G/10G
10 POWDER, FOR SUSPENSION ORAL EVERY 8 HOURS
Refills: 0 | Status: DISCONTINUED | OUTPATIENT
Start: 2022-11-25 | End: 2022-11-26

## 2022-11-25 RX ORDER — ETOMIDATE 2 MG/ML
11 INJECTION INTRAVENOUS ONCE
Refills: 0 | Status: COMPLETED | OUTPATIENT
Start: 2022-11-25 | End: 2022-11-25

## 2022-11-25 RX ORDER — PROPOFOL 10 MG/ML
10 INJECTION, EMULSION INTRAVENOUS
Qty: 1000 | Refills: 0 | Status: DISCONTINUED | OUTPATIENT
Start: 2022-11-25 | End: 2022-11-25

## 2022-11-25 RX ORDER — LACTULOSE 10 G/15ML
20 SOLUTION ORAL EVERY 6 HOURS
Refills: 0 | Status: DISCONTINUED | OUTPATIENT
Start: 2022-11-25 | End: 2022-01-01

## 2022-11-25 RX ORDER — ALBUTEROL 90 UG/1
2.5 AEROSOL, METERED ORAL
Refills: 0 | Status: DISCONTINUED | OUTPATIENT
Start: 2022-11-25 | End: 2022-11-25

## 2022-11-25 RX ORDER — CHLORHEXIDINE GLUCONATE 213 G/1000ML
1 SOLUTION TOPICAL
Refills: 0 | Status: DISCONTINUED | OUTPATIENT
Start: 2022-11-25 | End: 2022-01-01

## 2022-11-25 RX ORDER — SODIUM BICARBONATE 1 MEQ/ML
50 SYRINGE (ML) INTRAVENOUS ONCE
Refills: 0 | Status: COMPLETED | OUTPATIENT
Start: 2022-11-25 | End: 2022-11-25

## 2022-11-25 RX ORDER — CHLORHEXIDINE GLUCONATE 213 G/1000ML
15 SOLUTION TOPICAL EVERY 12 HOURS
Refills: 0 | Status: DISCONTINUED | OUTPATIENT
Start: 2022-11-25 | End: 2022-11-25

## 2022-11-25 RX ORDER — AZITHROMYCIN 500 MG/1
500 TABLET, FILM COATED ORAL ONCE
Refills: 0 | Status: COMPLETED | OUTPATIENT
Start: 2022-11-25 | End: 2022-11-25

## 2022-11-25 RX ORDER — AZITHROMYCIN 500 MG/1
TABLET, FILM COATED ORAL
Refills: 0 | Status: DISCONTINUED | OUTPATIENT
Start: 2022-11-25 | End: 2022-01-01

## 2022-11-25 RX ORDER — CEFTRIAXONE 500 MG/1
INJECTION, POWDER, FOR SOLUTION INTRAMUSCULAR; INTRAVENOUS
Refills: 0 | Status: DISCONTINUED | OUTPATIENT
Start: 2022-11-25 | End: 2022-01-01

## 2022-11-25 RX ORDER — CALCIUM GLUCONATE 100 MG/ML
1 VIAL (ML) INTRAVENOUS ONCE
Refills: 0 | Status: COMPLETED | OUTPATIENT
Start: 2022-11-25 | End: 2022-11-25

## 2022-11-25 RX ORDER — LACTULOSE 10 G/15ML
20 SOLUTION ORAL
Refills: 0 | Status: DISCONTINUED | OUTPATIENT
Start: 2022-11-25 | End: 2022-11-25

## 2022-11-25 RX ORDER — PIPERACILLIN AND TAZOBACTAM 4; .5 G/20ML; G/20ML
3.38 INJECTION, POWDER, LYOPHILIZED, FOR SOLUTION INTRAVENOUS ONCE
Refills: 0 | Status: COMPLETED | OUTPATIENT
Start: 2022-11-25 | End: 2022-11-25

## 2022-11-25 RX ORDER — DEXTROSE 50 % IN WATER 50 %
50 SYRINGE (ML) INTRAVENOUS ONCE
Refills: 0 | Status: COMPLETED | OUTPATIENT
Start: 2022-11-25 | End: 2022-11-25

## 2022-11-25 RX ORDER — PHENYLEPHRINE HYDROCHLORIDE 10 MG/ML
1 INJECTION INTRAVENOUS
Qty: 160 | Refills: 0 | Status: DISCONTINUED | OUTPATIENT
Start: 2022-11-25 | End: 2022-11-26

## 2022-11-25 RX ORDER — CEFTRIAXONE 500 MG/1
1000 INJECTION, POWDER, FOR SOLUTION INTRAMUSCULAR; INTRAVENOUS EVERY 24 HOURS
Refills: 0 | Status: DISCONTINUED | OUTPATIENT
Start: 2022-11-26 | End: 2022-01-01

## 2022-11-25 RX ORDER — HEPARIN SODIUM 5000 [USP'U]/ML
5000 INJECTION INTRAVENOUS; SUBCUTANEOUS EVERY 12 HOURS
Refills: 0 | Status: DISCONTINUED | OUTPATIENT
Start: 2022-11-25 | End: 2022-01-01

## 2022-11-25 RX ORDER — ROCURONIUM BROMIDE 10 MG/ML
40 VIAL (ML) INTRAVENOUS ONCE
Refills: 0 | Status: COMPLETED | OUTPATIENT
Start: 2022-11-25 | End: 2022-11-25

## 2022-11-25 RX ORDER — PANTOPRAZOLE SODIUM 20 MG/1
40 TABLET, DELAYED RELEASE ORAL DAILY
Refills: 0 | Status: DISCONTINUED | OUTPATIENT
Start: 2022-11-25 | End: 2022-01-01

## 2022-11-25 RX ORDER — SODIUM CHLORIDE 9 MG/ML
1100 INJECTION INTRAMUSCULAR; INTRAVENOUS; SUBCUTANEOUS ONCE
Refills: 0 | Status: COMPLETED | OUTPATIENT
Start: 2022-11-25 | End: 2022-11-25

## 2022-11-25 RX ORDER — SODIUM CHLORIDE 9 MG/ML
2000 INJECTION INTRAMUSCULAR; INTRAVENOUS; SUBCUTANEOUS ONCE
Refills: 0 | Status: COMPLETED | OUTPATIENT
Start: 2022-11-25 | End: 2022-11-25

## 2022-11-25 RX ORDER — NOREPINEPHRINE BITARTRATE/D5W 8 MG/250ML
0.05 PLASTIC BAG, INJECTION (ML) INTRAVENOUS
Qty: 8 | Refills: 0 | Status: DISCONTINUED | OUTPATIENT
Start: 2022-11-25 | End: 2022-01-01

## 2022-11-25 RX ORDER — POLYETHYLENE GLYCOL 3350 17 G/17G
17 POWDER, FOR SOLUTION ORAL DAILY
Refills: 0 | Status: DISCONTINUED | OUTPATIENT
Start: 2022-11-26 | End: 2022-01-01

## 2022-11-25 RX ORDER — INSULIN HUMAN 100 [IU]/ML
5 INJECTION, SOLUTION SUBCUTANEOUS ONCE
Refills: 0 | Status: COMPLETED | OUTPATIENT
Start: 2022-11-25 | End: 2022-11-25

## 2022-11-25 RX ORDER — VANCOMYCIN HCL 1 G
VIAL (EA) INTRAVENOUS
Refills: 0 | Status: DISCONTINUED | OUTPATIENT
Start: 2022-11-25 | End: 2022-11-25

## 2022-11-25 RX ORDER — VANCOMYCIN HCL 1 G
500 VIAL (EA) INTRAVENOUS ONCE
Refills: 0 | Status: COMPLETED | OUTPATIENT
Start: 2022-11-25 | End: 2022-11-25

## 2022-11-25 RX ORDER — CEFTRIAXONE 500 MG/1
1000 INJECTION, POWDER, FOR SOLUTION INTRAMUSCULAR; INTRAVENOUS ONCE
Refills: 0 | Status: COMPLETED | OUTPATIENT
Start: 2022-11-25 | End: 2022-11-25

## 2022-11-25 RX ORDER — SODIUM BICARBONATE 1 MEQ/ML
0.62 SYRINGE (ML) INTRAVENOUS
Qty: 150 | Refills: 0 | Status: DISCONTINUED | OUTPATIENT
Start: 2022-11-25 | End: 2022-11-26

## 2022-11-25 RX ORDER — SODIUM BICARBONATE 1 MEQ/ML
100 SYRINGE (ML) INTRAVENOUS ONCE
Refills: 0 | Status: COMPLETED | OUTPATIENT
Start: 2022-11-25 | End: 2022-11-25

## 2022-11-25 RX ORDER — AZITHROMYCIN 500 MG/1
500 TABLET, FILM COATED ORAL EVERY 24 HOURS
Refills: 0 | Status: DISCONTINUED | OUTPATIENT
Start: 2022-11-26 | End: 2022-01-01

## 2022-11-25 RX ADMIN — Medication 50 MILLILITER(S): at 06:02

## 2022-11-25 RX ADMIN — CEFTRIAXONE 1000 MILLIGRAM(S): 500 INJECTION, POWDER, FOR SOLUTION INTRAMUSCULAR; INTRAVENOUS at 08:49

## 2022-11-25 RX ADMIN — SODIUM CHLORIDE 1100 MILLILITER(S): 9 INJECTION INTRAMUSCULAR; INTRAVENOUS; SUBCUTANEOUS at 02:22

## 2022-11-25 RX ADMIN — LACTULOSE 20 GRAM(S): 10 SOLUTION ORAL at 17:20

## 2022-11-25 RX ADMIN — Medication 100 MILLIEQUIVALENT(S): at 05:08

## 2022-11-25 RX ADMIN — Medication 3.4 MICROGRAM(S)/KG/MIN: at 09:49

## 2022-11-25 RX ADMIN — Medication 200 GRAM(S): at 23:06

## 2022-11-25 RX ADMIN — PIPERACILLIN AND TAZOBACTAM 3.38 GRAM(S): 4; .5 INJECTION, POWDER, LYOPHILIZED, FOR SOLUTION INTRAVENOUS at 03:06

## 2022-11-25 RX ADMIN — Medication 100 GRAM(S): at 11:07

## 2022-11-25 RX ADMIN — Medication 40 MILLIGRAM(S): at 02:40

## 2022-11-25 RX ADMIN — SODIUM ZIRCONIUM CYCLOSILICATE 10 GRAM(S): 10 POWDER, FOR SUSPENSION ORAL at 15:05

## 2022-11-25 RX ADMIN — Medication 100 MILLIGRAM(S): at 03:06

## 2022-11-25 RX ADMIN — SODIUM ZIRCONIUM CYCLOSILICATE 10 GRAM(S): 10 POWDER, FOR SUSPENSION ORAL at 23:05

## 2022-11-25 RX ADMIN — Medication 50 MILLILITER(S): at 10:01

## 2022-11-25 RX ADMIN — LACTULOSE 20 GRAM(S): 10 SOLUTION ORAL at 23:07

## 2022-11-25 RX ADMIN — Medication 150 MEQ/KG/HR: at 09:00

## 2022-11-25 RX ADMIN — PIPERACILLIN AND TAZOBACTAM 200 GRAM(S): 4; .5 INJECTION, POWDER, LYOPHILIZED, FOR SOLUTION INTRAVENOUS at 02:22

## 2022-11-25 RX ADMIN — ETOMIDATE 11 MILLIGRAM(S): 2 INJECTION INTRAVENOUS at 02:40

## 2022-11-25 RX ADMIN — Medication 2 MILLIGRAM(S): at 09:42

## 2022-11-25 RX ADMIN — SODIUM CHLORIDE 2000 MILLILITER(S): 9 INJECTION INTRAMUSCULAR; INTRAVENOUS; SUBCUTANEOUS at 04:31

## 2022-11-25 RX ADMIN — Medication 3.4 MICROGRAM(S)/KG/MIN: at 02:22

## 2022-11-25 RX ADMIN — INSULIN HUMAN 5 UNIT(S): 100 INJECTION, SOLUTION SUBCUTANEOUS at 10:02

## 2022-11-25 RX ADMIN — Medication 3.4 MICROGRAM(S)/KG/MIN: at 16:30

## 2022-11-25 RX ADMIN — CHLORHEXIDINE GLUCONATE 1 APPLICATION(S): 213 SOLUTION TOPICAL at 06:24

## 2022-11-25 RX ADMIN — Medication 3.4 MICROGRAM(S)/KG/MIN: at 12:40

## 2022-11-25 RX ADMIN — Medication 50 MILLIEQUIVALENT(S): at 08:49

## 2022-11-25 RX ADMIN — Medication 200 GRAM(S): at 05:20

## 2022-11-25 RX ADMIN — ALBUTEROL 2.5 MILLIGRAM(S): 90 AEROSOL, METERED ORAL at 05:53

## 2022-11-25 RX ADMIN — CHLORHEXIDINE GLUCONATE 15 MILLILITER(S): 213 SOLUTION TOPICAL at 08:12

## 2022-11-25 RX ADMIN — Medication 100 MEQ/KG/HR: at 06:02

## 2022-11-25 RX ADMIN — Medication 3.4 MICROGRAM(S)/KG/MIN: at 23:06

## 2022-11-25 RX ADMIN — Medication 150 MEQ/KG/HR: at 23:06

## 2022-11-25 RX ADMIN — Medication 50 MILLIEQUIVALENT(S): at 10:04

## 2022-11-25 RX ADMIN — ALBUTEROL 2.5 MILLIGRAM(S): 90 AEROSOL, METERED ORAL at 05:52

## 2022-11-25 RX ADMIN — LACTULOSE 20 GRAM(S): 10 SOLUTION ORAL at 11:26

## 2022-11-25 RX ADMIN — PHENYLEPHRINE HYDROCHLORIDE 7.93 MICROGRAM(S)/KG/MIN: 10 INJECTION INTRAVENOUS at 14:00

## 2022-11-25 RX ADMIN — AZITHROMYCIN 500 MILLIGRAM(S): 500 TABLET, FILM COATED ORAL at 09:15

## 2022-11-25 RX ADMIN — HEPARIN SODIUM 5000 UNIT(S): 5000 INJECTION INTRAVENOUS; SUBCUTANEOUS at 17:20

## 2022-11-25 RX ADMIN — PIPERACILLIN AND TAZOBACTAM 25 GRAM(S): 4; .5 INJECTION, POWDER, LYOPHILIZED, FOR SOLUTION INTRAVENOUS at 04:52

## 2022-11-25 RX ADMIN — PANTOPRAZOLE SODIUM 40 MILLIGRAM(S): 20 TABLET, DELAYED RELEASE ORAL at 05:26

## 2022-11-25 RX ADMIN — CHLORHEXIDINE GLUCONATE 15 MILLILITER(S): 213 SOLUTION TOPICAL at 17:21

## 2022-11-25 RX ADMIN — Medication 500 MILLIGRAM(S): at 04:31

## 2022-11-25 NOTE — PATIENT PROFILE ADULT - FALL HARM RISK - HARM RISK INTERVENTIONS
Assistance with ambulation/Assistance OOB with selected safe patient handling equipment/Communicate Risk of Fall with Harm to all staff/Discuss with provider need for PT consult/Monitor gait and stability/Reinforce activity limits and safety measures with patient and family/Tailored Fall Risk Interventions/Visual Cue: Yellow wristband and red socks/Bed in lowest position, wheels locked, appropriate side rails in place/Call bell, personal items and telephone in reach/Instruct patient to call for assistance before getting out of bed or chair/Non-slip footwear when patient is out of bed/[place] to call system/Physically safe environment - no spills, clutter or unnecessary equipment/Purposeful Proactive Rounding/Room/bathroom lighting operational, light cord in reach

## 2022-11-25 NOTE — H&P ADULT - CRITICAL CARE ATTENDING COMMENT
Pt is a 63 yo M with h/o CKD 4, chronic pancreatitis s/p Puestow procedure, hep C s/p SVR and benign esophageal stricture (post-stent 4/19/22 complicated by need for CRE balloon dilation, reposition and suture on 4/29/2022). Pt was found unresponsive in basement. Per EMS, brother said pt was "acting altered and hasn't spoken to him in over 3 days."  In ER patient unresponsive with severe metabolic abnormalities and intubated for airway protection. Pt found to have the following abnormal labs: K 7.4 BUN/Cr 167/8.9 BNP 22400 Alk phos 1279 AST 2956 ALT 1124 NH3 316 Trop 754->1407 pH 6.8 bicarb 3 UA WBC > 50 LE mod Bacteria many. Pt sent for CT C/A/P: 1. New large cavitary consolidation in the right upper lobe, likely infectious in etiology. Differential diagnosis is cavitary mass. 2. Bilateral lower lobe posterior groundglass opacities with minimal superimposed patchy opacities in the right lower lobe, nonspecific but possibly infection. 3. Markedly limited evaluation of the abdomen due to lack of contrast and lack of internal fat planes. Esophageal stent with tip in the stomach with nodular soft tissue inseparable from the stent tip, which could reflect adherent debris. Mass is not excluded. 4. Large colonic stool burden. Mildly dilated loops of air-filled small bowel throughout the abdomen. Difficult to evaluate for transition point. Findings could reflect ileus, though small bowel obstruction is not excluded. Repeat with oral contrast can be considered for further evaluation if there is clinical concern for obstruction. ICU dx: 1) Toxic/metabolic/hepatic encephalopathy 2) Resp failure requiring intubation 3) Septic shock 2 to PNA vs UTI 4) Acute on CKD 2 to ATN with hyperkalemia 5) Shock liver    Resp: Hyperventilate 2 to severe metabolic acidosis  ID: F/u Cx/ Cont Ceftriaxone + Zithromax  CVS: Levophed to maintain MAP >65/ Trend trop/ Check EKG and Echo  Heme: DVT prophylaxis with sq Heparin  FEN: NPO for now/ Cont Bicarb drip/ Repeat BMP  GI: Bowel prep pt with large colonic stool burden/ Trend LFTs/ Possible GI/Surg consult depending on pt's clinical course  Renal: Cont Bicarb drip and follow BUN/Cr and UO/ No HD based on pt's current condition/ F/u recommendations as per Renal  Neuro: Pt twitching of the eyes, lips and head; stopped with IV Ativan/ Initial CT head no acute pathology Pt is a 63 yo M with h/o CKD 4, chronic pancreatitis s/p Puestow procedure, hep C s/p SVR and benign esophageal stricture (post-stent 4/19/22 complicated by need for CRE balloon dilation, reposition and suture on 4/29/2022). Pt was found unresponsive in basement. Per EMS, brother said pt was "acting altered and hasn't spoken to him in over 3 days."  In ER patient unresponsive with severe metabolic abnormalities and intubated for airway protection. Pt found to have the following abnormal labs: K 7.4 BUN/Cr 167/8.9 BNP 21494 Alk phos 1279 AST 2956 ALT 1124 NH3 316 Trop 754->1407 pH 6.8 bicarb 3 UA WBC > 50 LE mod Bacteria many. Pt sent for CT C/A/P: 1. New large cavitary consolidation in the right upper lobe, likely infectious in etiology. Differential diagnosis is cavitary mass. 2. Bilateral lower lobe posterior groundglass opacities with minimal superimposed patchy opacities in the right lower lobe, nonspecific but possibly infection. 3. Markedly limited evaluation of the abdomen due to lack of contrast and lack of internal fat planes. Esophageal stent with tip in the stomach with nodular soft tissue inseparable from the stent tip, which could reflect adherent debris. Mass is not excluded. 4. Large colonic stool burden. Mildly dilated loops of air-filled small bowel throughout the abdomen. Difficult to evaluate for transition point. Findings could reflect ileus, though small bowel obstruction is not excluded. Repeat with oral contrast can be considered for further evaluation if there is clinical concern for obstruction. ICU dx: 1) Toxic/metabolic/hepatic encephalopathy 2) Resp failure requiring intubation 3) Septic shock 2 to PNA vs UTI 4) Acute on CKD 2 to ATN with hyperkalemia 5) Severe metabolic acidosis 6) Shock liver    Resp: Hyperventilate 2 to severe metabolic acidosis  ID: F/u Cx/ Cont Ceftriaxone + Zithromax  CVS: Levophed to maintain MAP >65/ Trend trop/ Check EKG and Echo  Heme: DVT prophylaxis with sq Heparin  FEN: NPO for now/ Cont Bicarb drip/ Repeat BMP  GI: Bowel prep pt with large colonic stool burden/ Trend LFTs/ Possible GI/Surg consult depending on pt's clinical course  Renal: Cont Bicarb drip and follow BUN/Cr and UO/ No HD based on pt's current condition/ F/u recommendations as per Renal  Neuro: Pt twitching of the eyes, lips and head; stopped with IV Ativan/ Initial CT head no acute pathology/ Minimize sedation to assess neuro status/ Referral for [facility]  Social: Update family    CCT: 60 min not in conjunction with the NP

## 2022-11-25 NOTE — ED PROVIDER NOTE - OBJECTIVE STATEMENT
61 yo M bibems from home for FTT.  Pt. was found unresponsive in brother's apartment/basement.  Pt. cannot provide history.  Per EMS, brother said he was acting altered 3 days ago when pt. was last seen.  No other complaints/inciting event.  EMS bagging on scene to oxygenate.  Pt. withdraws from pain and moans.   ROS: unobtainable from patient   PMH: negative; Meds: Denies; SH: Denies smoking/drinking/drug use 61 yo M bibems from home for FTT.  Pt. was found unresponsive in brother's apartment/basement.  Pt. cannot provide history.  Per EMS, brother said he was acting altered 3 days ago when pt. was last seen.  No other complaints/inciting event.  EMS bagging on scene to oxygenate.  Pt. withdraws from pain and moans.   ROS: unobtainable from patient   PMH: chronic pancreatitis s/p pusestow procedure (complicated by chronic pain of which secondary to alcohol abuse), CKD4, hep C s/p SVR and benign esophageal stricture (post-stent 4/19/22 c/b need for CRE balloon dilation, reposition and suture on 4/29); Meds: See EMR for list; SH: Denies smoking/drinking/drug use

## 2022-11-25 NOTE — H&P ADULT - ASSESSMENT
Assessment: 62 yr old M with hx (per chart) of chronic pancreatitis s/p pusestow procedure, CKD4, hep C s/p SVR, esophageal stricture w/ stent in April was found unresponsive in basement. Per EMS, brother said he was "acting altered and hasn't spoken to him in over 3 days."  In ER patient unresponsive with severe metabolic abnormalities and intubated for airway protection. Admitted to ICU for air protection and hemodynamic monitoring secondary to metabolic encephalopathy.     Plan:   Neuro: Prior to intubation patient agitated/tracking but unable to follow commands. Now intubated and sedated, wean as tolerates. Remain on propofol, titrate to RASS goal. CT head negative for acute hemorrhage. Ammonia >300, lactulose enema ordered. Mental status most likely secondary to metabolic encephalopathy, continue to monitor closely.   Resp: Intubated on AC ventilation. F/u repeat ABG, wean as tolerates. CT chest with RUL consolidation "caviltary mass" and b/l LL ground-glass opacities.   Cardiac: Bradycardic on monitor secondary to hypothermia- now improving. f/u repeat EKG. Elevated BNP. Troponins elevated, f/u repeat cardiac enzymes. Echo pending. Levophed infusing, titrate to MAP >65.   GI: CT abdpelvis shows dilated bowel loops. NGT with LCWS, keep NPO. daily PPI.    Renal: In ARF of YUNIEL on CKD, secondary to dehydration with elevated Cr/BUN. Hyperkalemic s/p shift with insulin/d50/calciumgluconate/albuterol/sodium bicarb. S/p 3L IVF bolus' in ER, given extreme dehydration will POCUS on admission to assess need for fluid. F/u with serial CMPs. Strict I&Os w/ chen catheter. Monitor and replete electrolytes. Bicarb infusing for extreme metabolic abnormalities. CK pending in setting of possible rhabo. Consider Nephrology consult.   ID: Hypothermia, with undetectable body temperature- remain on bear hugger. trend WBC and fever curve closely. Lactate 2.9. f/u cultures/MRSA. Empiric abx with zosyn for possible PNA.   Endo: Hypogylcemia, blood glucose monitoring every 2 hours. Dextrose supplementation as needed.   DVT: heparin subq   GOC: Mother updated on care. Full code.       *case discussed with MD Zayas  Assessment: 62 yr old M with hx (per chart) of chronic pancreatitis s/p pusestow procedure, CKD4, hep C s/p SVR, esophageal stricture w/ stent in April was found unresponsive in basement. Per EMS, brother said he was "acting altered and hasn't spoken to him in over 3 days."  In ER patient unresponsive with severe metabolic abnormalities and intubated for airway protection. Admitted to ICU for air protection and hemodynamic monitoring secondary to metabolic encephalopathy.     Plan:   Neuro: Prior to intubation patient agitated/tracking but unable to follow commands. Now intubated and sedated, wean as tolerates. Remain on propofol, titrate to RASS goal. CT head negative for acute hemorrhage. Ammonia >300, lactulose enema ordered. Mental status most likely secondary to metabolic encephalopathy, continue to monitor closely.   Resp: Intubated on AC ventilation. F/u repeat ABG, wean as tolerates. CT chest with RUL consolidation "caviltary mass" and b/l LL ground-glass opacities.   Cardiac: Bradycardic on monitor secondary to hypothermia- now improving. f/u repeat EKG. Elevated BNP. Troponins elevated, f/u repeat cardiac enzymes. Echo pending. Levophed infusing, titrate to MAP >65.   GI: CT abdpelvis shows dilated bowel loops. NGT with LCWS, keep NPO. daily PPI.    Renal: In ARF of YUNIEL on CKD, secondary to dehydration with elevated Cr/BUN. Hyperkalemic s/p shift with insulin/d50/calciumgluconate/albuterol/sodium bicarb. S/p 3L IVF bolus' in ER, given extreme dehydration will POCUS on admission to assess need for fluid. F/u with serial CMPs. Strict I&Os w/ chen catheter. Monitor and replete electrolytes. Bicarb infusing for extreme metabolic abnormalities. CK pending in setting of possible rhabo. Consider Nephrology consult.   ID: Hypothermia, with undetectable body temperature- remain on bear hugger. trend WBC and fever curve closely. Lactate 2.9. f/u cultures/MRSA. Empiric abx with ceftriazone/azithro possible aspiration PNA. Pending Legionella   Endo: Hypogylcemia, blood glucose monitoring every 2 hours. Dextrose supplementation as needed.   DVT: heparin subq   GOC: Mother updated on care. Full code.       *case discussed with MD Zayas

## 2022-11-25 NOTE — H&P ADULT - HISTORY OF PRESENT ILLNESS
HPI: 62 yr old M with hx per chart of chronic pancreatitis s/p pusestow procedure, CKD4, hep C s/p SVR, esophageal stricture w/ stent in April was found unresponsive in basement. Per EMS, brother said he was "acting altered and hasn't spoken to him in over 3 days." In ER patient unresponsive with severe metabolic abnormalities and intubated for airway protection. Labs notable for severe metabolic acidosis, hyperkalemia, elevated ammonia, & elevated troponins.         HPI: 62 yr old M with hx (per chart) of chronic pancreatitis s/p pusestow procedure, CKD4, hep C s/p SVR, esophageal stricture w/ stent in April was found unresponsive in basement. Per EMS, brother said he was "acting altered and hasn't spoken to him in over 3 days." In ER patient unresponsive with severe metabolic abnormalities and intubated for airway protection. Labs notable for severe metabolic acidosis, hyperkalemia, elevated ammonia, & elevated troponins.

## 2022-11-25 NOTE — H&P ADULT - NSHPLABSRESULTS_GEN_ALL_CORE
LABS:               8.2    7.85  )-----------( 190      ( 25 Nov 2022 01:46 )             25.6     11-25    146<H>  |  124<H>  |  167<H>  ----------------------------<  66<L>  7.4<HH>   |  5<LL>  |  8.94<H>    Ca    7.2<L>      25 Nov 2022 01:46    TPro  5.7<L>  /  Alb  2.2<L>  /  TBili  0.5  /  DBili  x   /  AST  2956  /  ALT  1124<H>  /  AlkPhos  1279  11-25    LIVER FUNCTIONS - ( 25 Nov 2022 01:46 )  Alb: 2.2 g/dL / Pro: 5.7 gm/dL / ALK PHOS: 1279 U/L / ALT: 1124 U/L / AST: 2956 U/L / GGT: x         thy  PT/INR - ( 25 Nov 2022 01:46 )   PT: 13.6 sec;   INR: 1.14 ratio    PTT - ( 25 Nov 2022 01:46 )  PTT:37.2 sec    · EKG Date/Time: 25-Nov-2022 03:38  · Interpretation: EKG performed in ED, NSR at 67, inferior TWIs, normal intervals    < from: CT Abdomen and Pelvis No Cont (11.25.22 @ 04:28) >    IMPRESSION:  1. New large cavitary consolidation in the right upper lobe, likely   infectious in etiology. Differential diagnosis is cavitary mass.    2. Bilateral lower lobe posterior groundglass opacities with minimal   superimposed patchy opacities in the right lower lobe, nonspecific but   possibly infection.    3. Markedly limited evaluation of the abdomen due to lack of contrast and   lack of internal fat planes. Esophageal stent with tip in the stomach   with nodular soft tissue inseparable from the stent tip, which could   reflect adherent debris. Mass is not excluded.    4. Large colonic stool burden. Mildly dilated loops of air-filled small   bowel throughout the abdomen. Difficult to evaluate for transition point.   Findings could reflect ileus, though small bowel obstruction is not   excluded. Repeat with oral contrast can be considered for further   evaluation if there is clinical concern for obstruction.    --- End of Report ---    < end of copied text >      ICU Vital Signs Last 24 Hrs  T(C): --  T(F): --  HR: 61 (25 Nov 2022 05:42) (45 - 76)  BP: 116/57 (25 Nov 2022 05:42) (55/26 - 149/43)  BP(mean): 70 (25 Nov 2022 05:14) (32 - 99)  ABP: --  ABP(mean): --  RR: 20 (25 Nov 2022 05:42) (17 - 22)  SpO2: 100% (25 Nov 2022 05:42) (67% - 100%)    O2 Parameters below as of 25 Nov 2022 03:18  Patient On (Oxygen Delivery Method): ventilator      Mode: AC/ CMV (Assist Control/ Continuous Mandatory Ventilation)  RR (machine): 20  TV (machine): 350  FiO2: 60  PEEP: 5  ITime: 0.9  MAP: 10  PIP: 16

## 2022-11-25 NOTE — ED PROVIDER NOTE - CLINICAL SUMMARY MEDICAL DECISION MAKING FREE TEXT BOX
61 yo M with AMS, FTT  -basic labs, coags, blood cx, ua, cx, lactate, lipase, finger stick, rvp, bnp, trop, CT brain, CXR, ekg, iv, vanc/zosyn coverage, hydration bolus, norepi, intubate for airway protection, sepsis labs  -f/u results, reeval, ICU consult/admit

## 2022-11-25 NOTE — ED PROVIDER NOTE - CARE PLAN
Principal Discharge DX:	Adult failure to thrive   1 Principal Discharge DX:	Adult failure to thrive  Secondary Diagnosis:	AMS (altered mental status)  Secondary Diagnosis:	Acute respiratory failure with hypoxia

## 2022-11-25 NOTE — ED ADULT NURSE NOTE - OBJECTIVE STATEMENT
Pt arrived at ed via ambulance. EMS bag the patient, pt is alert and responsive to stimuli. Bradycardic. Cold to touch. Hypotensive as per ems FS 60. As per ems endorsement pt found at home feces all over. Pt arrived at ed via ambulance. EMS bag the patient, pt is unresponsive. Bradycardic. Cold to touch. Hypotensive as per ems FS 60. As per ems endorsement pt found at home feces all over.

## 2022-11-25 NOTE — ED PROCEDURE NOTE - CPROC ED TIME OUT STATEMENT1
“Patient's name, , procedure and correct site were confirmed during the [facility] Timeout.”
“Patient's name, , procedure and correct site were confirmed during the [facility] Timeout.”

## 2022-11-25 NOTE — ED PROVIDER NOTE - PROGRESS NOTE DETAILS
ICU is accepting, Dr. Zayas, pending CT read   pt. intubated, triple lumen obtained, vitals improving, pt. being warmed pt. accepted to ICU

## 2022-11-25 NOTE — ED PROVIDER NOTE - PHYSICAL EXAMINATION
Gen: gaunt/emaciated appearance  Head: ncat, perrla, eomi b/l  Neck: supple, no lymphadenopathy, no midline deviation  Heart: rrr, no m/r/g  Lungs: CTA b/l, no rales/ronchi/wheezes  Abd: soft, nontender, non-distended, no rebound or guarding  Ext: no clubbing/cyanosis/edema  Neuro: + gag and corneal, withdraws from pain, moans, tracks objects

## 2022-11-25 NOTE — CONSULT NOTE ADULT - SUBJECTIVE AND OBJECTIVE BOX
Patient chart reviewed, full consult to follow.     Discussed with critical care team.   Will follow closely for hemodynamic stability and feasibility of hemodialysis.    Thank you for the courtesy of this consultation. [facility] NEPHROLOGY SERVICES, [facility]  NEPHROLOGY AND HYPERTENSION  [address]  [address]  [place] [address]  [phone]    MD HALIE OLIVIA MD YELENA ROSENBERG, MD BINNY KOSHY, MD CHRISTOPHER CAPUTO, MD EDWARD BOVER, MD      Information from chart:  "Patient is a 62y old  Male who presents with a chief complaint of Unresponsiveness/FTT (2022 11:01)    HPI:  HPI: 62 yr old M with hx (per chart) of chronic pancreatitis s/p pusestow procedure, CKD4, hep C s/p SVR, esophageal stricture w/ stent in April was found unresponsive in basement. Per EMS, brother said he was "acting altered and hasn't spoken to him in over 3 days." In ER patient unresponsive with severe metabolic abnormalities and intubated for airway protection. Labs notable for severe metabolic acidosis, hyperkalemia, elevated ammonia, & elevated troponins.        Patient found unresponsive; noted YUNIEL; on CKD; severe acidosis; hyperkalemia   Hypotension; pressor support   ph 6.8  + myoclonic movements      (2022 05:31)   "    PAST MEDICAL & SURGICAL HISTORY:  ETOH abuse  sober x1 year approximately      Pancreatitis      Hepatitis C  treated      Gout      HTN (hypertension)      Chronic kidney disease (CKD)      H/O chronic pancreatitis      Gout      Alcohol abuse      Gastric perforation        FAMILY HISTORY:  FH: HTN (hypertension)      Allergies    Tylenol (Unknown)    Intolerances      Home Medications:  bisacodyl 5 mg oral delayed release tablet: 1 tab(s) orally once a day (at bedtime) (2022 17:55)  MiraLax oral powder for reconstitution: 17 gram(s) orally 2 times a day (2022 17:55)  oxyCODONE 5 mg oral tablet: 3 tab(s) orally every 6 hours (05 Sep 2022 13:41)  thiamine 100 mg oral tablet: 1 tab(s) orally once a day (2022 17:55)    MEDICATIONS  (STANDING):  albuterol    0.083%.. 2.5 milliGRAM(s) Nebulizer every 20 minutes  azithromycin  IVPB      cefTRIAXone   IVPB      chlorhexidine 0.12% Liquid 15 milliLiter(s) Oral Mucosa every 12 hours  chlorhexidine 2% Cloths 1 Application(s) Topical <User Schedule>  heparin   Injectable 5000 Unit(s) SubCutaneous every 12 hours  lactulose Syrup 20 Gram(s) Oral every 6 hours  norepinephrine Infusion 0.05 MICROgram(s)/kG/Min (3.4 mL/Hr) IV Continuous <Continuous>  pantoprazole  Injectable 40 milliGRAM(s) IV Push daily  phenylephrine    Infusion 1 MICROgram(s)/kG/Min (7.93 mL/Hr) IV Continuous <Continuous>  propofol Infusion 10 MICROgram(s)/kG/Min (2.18 mL/Hr) IV Continuous <Continuous>  sodium bicarbonate  Infusion 0.62 mEq/kG/Hr (150 mL/Hr) IV Continuous <Continuous>  sodium zirconium cyclosilicate 10 Gram(s) Oral every 8 hours    MEDICATIONS  (PRN):  sodium chloride 0.9% lock flush 10 milliLiter(s) IV Push every 1 hour PRN Pre/post blood products, medications, blood draw, and to maintain line patency    Vital Signs Last 24 Hrs  T(C): 37.3 (2022 15:00), Max: 37.3 (2022 15:00)  T(F): 99.1 (2022 15:00), Max: 99.1 (2022 15:00)  HR: 88 (2022 17:00) (45 - 94)  BP: 110/62 (2022 17:00) (55/26 - 149/43)  BP(mean): 76 (2022 17:00) (32 - 99)  RR: 21 (2022 17:00) (15 - 26)  SpO2: 100% (2022 17:00) (67% - 100%)    Parameters below as of 2022 07:30  Patient On (Oxygen Delivery Method): ventilator    O2 Concentration (%): 60  Mode: AC/ CMV (Assist Control/ Continuous Mandatory Ventilation)  RR (machine): 20  TV (machine): 350  FiO2: 60  PEEP: 5  ITime: 1  MAP: 2  PIP: 10    Daily Height in cm: 177.8 (2022 00:42)    Daily     22 @ 07:01  -  22 @ 07:00  --------------------------------------------------------  IN: 163 mL / OUT: 0 mL / NET: 163 mL    22 @ 07:01  -  22 @ 17:41  --------------------------------------------------------  IN: 2686.1 mL / OUT: 0 mL / NET: 2686.1 mL      CAPILLARY BLOOD GLUCOSE      POCT Blood Glucose.: 72 mg/dL (2022 00:43)    PHYSICAL EXAM:      T(C): 37.3 (22 @ 15:00), Max: 37.3 (22 @ 15:00)  HR: 88 (22 @ 17:00) (45 - 94)  BP: 110/62 (22 @ 17:00) (55/26 - 149/43)  RR: 21 (22 @ 17:00) (15 - 26)  SpO2: 100% (22 @ 17:00) (67% - 100%)  Wt(kg): --  Lungs clear  Heart S1S2  Abd soft distended   Extremities:   1-2 edema                  150<H>  |  122<H>  |  162<H>  ----------------------------<  267<H>  5.2   |  10<LL>  |  8.08<H>    Ca    6.1<LL>      2022 11:56    TPro  5.7<L>  /  Alb  2.2<L>  /  TBili  0.5  /  DBili  x   /  AST  2956  /  ALT  1124<H>  /  AlkPhos  1279                            8.2    7.85  )-----------( 190      ( 2022 01:46 )             25.6     Creatinine Trend: 8.08<--, 8.94<--  Urinalysis Basic - ( 2022 05:15 )    Color: Yellow / Appearance: very cloudy / S.010 / pH: x  Gluc: x / Ketone: Trace  / Bili: Negative / Urobili: Negative mg/dL   Blood: x / Protein: 500 mg/dL / Nitrite: Negative   Leuk Esterase: Moderate / RBC: 3-5 /HPF / WBC >50   Sq Epi: x / Non Sq Epi: Few / Bacteria: Many      ABG - ( 2022 08:32 )  pH, Arterial: 6.82  pH, Blood: x     /  pCO2: 45    /  pO2: 86    / HCO3: 7     / Base Excess: -25.2 /  SaO2: 92.9                  Assessment   YUNIEL septic shock; ischemic ATN; severe metabolic acidosis   Hypotension;     Plan  Too hemodynamically unstable for hemodialysis   Lokelma; IV bicarbonate; insulin/ D50; IV calcium  IV abx  Prognosis is poor   Discussed with critical care team     Austin Dukes MD        Patient chart reviewed, full consult to follow.     Discussed with critical care team.   Will follow closely for hemodynamic stability and feasibility of hemodialysis.    Thank you for the courtesy of this consultation.

## 2022-11-25 NOTE — H&P ADULT - NSHPPHYSICALEXAM_GEN_ALL_CORE
General: Cachetic/Emaciated appearance   Neuro: +gag/corneal, withdraws to pain, +tracking   HEENT: Pupils equal and symmetrically reactive to light.  PULM: Clear to auscultation bilaterally.  CVS: Bradycardia- no murmurs, rubs, or gallops.  ABD: Soft, nondistended, no masses.  EXT: No edema.  SKIN: Extremely cold

## 2022-11-25 NOTE — ED ADULT TRIAGE NOTE - CHIEF COMPLAINT QUOTE
BIBA from home for altered mental status, hypotension, hypoglycemia, hypoxia. as per emt, patient was checked by his brother and found him unresponsive, emt states brother last spoke with patient 2 days ago, had recent stent placed unsure which specific area, has been laying in bed for 1 week. patient was found in basement with the whole place contaminated covered with feces, emt fs 60

## 2022-11-26 LAB
ALBUMIN SERPL ELPH-MCNC: 1.8 G/DL — LOW (ref 3.3–5)
ALP SERPL-CCNC: 909 U/L — SIGNIFICANT CHANGE UP (ref 40–120)
ALT FLD-CCNC: 1421 U/L — HIGH (ref 12–78)
AMMONIA BLD-MCNC: 94 UMOL/L — HIGH (ref 11–32)
ANION GAP SERPL CALC-SCNC: 19 MMOL/L — HIGH (ref 5–17)
AST SERPL-CCNC: 2697 U/L — HIGH (ref 15–37)
BASOPHILS # BLD AUTO: 0 K/UL — SIGNIFICANT CHANGE UP (ref 0–0.2)
BASOPHILS NFR BLD AUTO: 0 % — SIGNIFICANT CHANGE UP (ref 0–2)
BILIRUB SERPL-MCNC: 0.5 MG/DL — SIGNIFICANT CHANGE UP (ref 0.2–1.2)
BLD GP AB SCN SERPL QL: SIGNIFICANT CHANGE UP
BUN SERPL-MCNC: 163 MG/DL — HIGH (ref 7–23)
CALCIUM SERPL-MCNC: 5.2 MG/DL — CRITICAL LOW (ref 8.5–10.1)
CHLORIDE SERPL-SCNC: 116 MMOL/L — HIGH (ref 96–108)
CO2 SERPL-SCNC: 12 MMOL/L — LOW (ref 22–31)
CREAT SERPL-MCNC: 7.8 MG/DL — HIGH (ref 0.5–1.3)
EGFR: 7 ML/MIN/1.73M2 — LOW
EOSINOPHIL # BLD AUTO: 0 K/UL — SIGNIFICANT CHANGE UP (ref 0–0.5)
EOSINOPHIL NFR BLD AUTO: 0 % — SIGNIFICANT CHANGE UP (ref 0–6)
GAS PNL BLDA: SIGNIFICANT CHANGE UP
GLUCOSE BLDC GLUCOMTR-MCNC: 134 MG/DL — HIGH (ref 70–99)
GLUCOSE BLDC GLUCOMTR-MCNC: 146 MG/DL — HIGH (ref 70–99)
GLUCOSE SERPL-MCNC: 117 MG/DL — HIGH (ref 70–99)
GRAM STN FLD: SIGNIFICANT CHANGE UP
HCT VFR BLD CALC: 24.2 % — LOW (ref 39–50)
HGB BLD-MCNC: 8.3 G/DL — LOW (ref 13–17)
LACTATE SERPL-SCNC: 1.9 MMOL/L — SIGNIFICANT CHANGE UP (ref 0.7–2)
LYMPHOCYTES # BLD AUTO: 0.17 K/UL — LOW (ref 1–3.3)
LYMPHOCYTES # BLD AUTO: 1 % — LOW (ref 13–44)
MAGNESIUM SERPL-MCNC: 1.8 MG/DL — SIGNIFICANT CHANGE UP (ref 1.6–2.6)
MANUAL SMEAR VERIFICATION: SIGNIFICANT CHANGE UP
MCHC RBC-ENTMCNC: 30 PG — SIGNIFICANT CHANGE UP (ref 27–34)
MCHC RBC-ENTMCNC: 34.3 G/DL — SIGNIFICANT CHANGE UP (ref 32–36)
MCV RBC AUTO: 87.4 FL — SIGNIFICANT CHANGE UP (ref 80–100)
METAMYELOCYTES # FLD: 1 % — HIGH (ref 0–0)
MONOCYTES # BLD AUTO: 0.5 K/UL — SIGNIFICANT CHANGE UP (ref 0–0.9)
MONOCYTES NFR BLD AUTO: 3 % — SIGNIFICANT CHANGE UP (ref 2–14)
NEUTROPHILS # BLD AUTO: 15.68 K/UL — HIGH (ref 1.8–7.4)
NEUTROPHILS NFR BLD AUTO: 71 % — SIGNIFICANT CHANGE UP (ref 43–77)
NEUTS BAND # BLD: 24 % — HIGH (ref 0–8)
NEUTS VAC BLD QL SMEAR: SLIGHT — SIGNIFICANT CHANGE UP
NRBC # BLD: 2 /100 — HIGH (ref 0–0)
NRBC # BLD: SIGNIFICANT CHANGE UP /100 WBCS (ref 0–0)
PHOSPHATE SERPL-MCNC: 10.8 MG/DL — SIGNIFICANT CHANGE UP (ref 2.5–4.5)
PLAT MORPH BLD: NORMAL — SIGNIFICANT CHANGE UP
PLATELET # BLD AUTO: 86 K/UL — LOW (ref 150–400)
POTASSIUM SERPL-MCNC: 4.3 MMOL/L — SIGNIFICANT CHANGE UP (ref 3.5–5.3)
POTASSIUM SERPL-SCNC: 4.3 MMOL/L — SIGNIFICANT CHANGE UP (ref 3.5–5.3)
PROT SERPL-MCNC: 4.6 GM/DL — LOW (ref 6–8.3)
RBC # BLD: 2.77 M/UL — LOW (ref 4.2–5.8)
RBC # FLD: 13.6 % — SIGNIFICANT CHANGE UP (ref 10.3–14.5)
RBC BLD AUTO: SIGNIFICANT CHANGE UP
SODIUM SERPL-SCNC: 147 MMOL/L — HIGH (ref 135–145)
SPECIMEN SOURCE: SIGNIFICANT CHANGE UP
TOXIC GRANULES BLD QL SMEAR: PRESENT — SIGNIFICANT CHANGE UP
TROPONIN I, HIGH SENSITIVITY RESULT: 8099.6 NG/L — HIGH
TROPONIN I, HIGH SENSITIVITY RESULT: 8217 NG/L — HIGH
WBC # BLD: 16.51 K/UL — HIGH (ref 3.8–10.5)
WBC # FLD AUTO: 16.51 K/UL — HIGH (ref 3.8–10.5)

## 2022-11-26 PROCEDURE — 36620 INSERTION CATHETER ARTERY: CPT | Mod: LT

## 2022-11-26 PROCEDURE — 93306 TTE W/DOPPLER COMPLETE: CPT | Mod: 26

## 2022-11-26 PROCEDURE — 99291 CRITICAL CARE FIRST HOUR: CPT | Mod: 25

## 2022-11-26 PROCEDURE — 93010 ELECTROCARDIOGRAM REPORT: CPT

## 2022-11-26 PROCEDURE — 36800 INSERTION OF CANNULA: CPT | Mod: GC

## 2022-11-26 RX ORDER — CALCIUM GLUCONATE 100 MG/ML
2 VIAL (ML) INTRAVENOUS ONCE
Refills: 0 | Status: COMPLETED | OUTPATIENT
Start: 2022-11-26 | End: 2022-11-26

## 2022-11-26 RX ORDER — SODIUM BICARBONATE 1 MEQ/ML
100 SYRINGE (ML) INTRAVENOUS ONCE
Refills: 0 | Status: COMPLETED | OUTPATIENT
Start: 2022-11-26 | End: 2022-11-26

## 2022-11-26 RX ADMIN — Medication 150 MEQ/KG/HR: at 14:42

## 2022-11-26 RX ADMIN — Medication 3.4 MICROGRAM(S)/KG/MIN: at 23:01

## 2022-11-26 RX ADMIN — CHLORHEXIDINE GLUCONATE 15 MILLILITER(S): 213 SOLUTION TOPICAL at 17:44

## 2022-11-26 RX ADMIN — HEPARIN SODIUM 5000 UNIT(S): 5000 INJECTION INTRAVENOUS; SUBCUTANEOUS at 17:44

## 2022-11-26 RX ADMIN — Medication 150 MEQ/KG/HR: at 07:31

## 2022-11-26 RX ADMIN — SODIUM ZIRCONIUM CYCLOSILICATE 10 GRAM(S): 10 POWDER, FOR SUSPENSION ORAL at 07:32

## 2022-11-26 RX ADMIN — Medication 200 GRAM(S): at 09:37

## 2022-11-26 RX ADMIN — CHLORHEXIDINE GLUCONATE 15 MILLILITER(S): 213 SOLUTION TOPICAL at 07:31

## 2022-11-26 RX ADMIN — CEFTRIAXONE 100 MILLIGRAM(S): 500 INJECTION, POWDER, FOR SOLUTION INTRAMUSCULAR; INTRAVENOUS at 07:31

## 2022-11-26 RX ADMIN — Medication 3.4 MICROGRAM(S)/KG/MIN: at 07:30

## 2022-11-26 RX ADMIN — LACTULOSE 20 GRAM(S): 10 SOLUTION ORAL at 17:43

## 2022-11-26 RX ADMIN — HEPARIN SODIUM 5000 UNIT(S): 5000 INJECTION INTRAVENOUS; SUBCUTANEOUS at 07:32

## 2022-11-26 RX ADMIN — CHLORHEXIDINE GLUCONATE 1 APPLICATION(S): 213 SOLUTION TOPICAL at 05:30

## 2022-11-26 RX ADMIN — Medication 100 MILLIEQUIVALENT(S): at 07:30

## 2022-11-26 RX ADMIN — AZITHROMYCIN 255 MILLIGRAM(S): 500 TABLET, FILM COATED ORAL at 07:31

## 2022-11-26 RX ADMIN — LACTULOSE 20 GRAM(S): 10 SOLUTION ORAL at 07:30

## 2022-11-26 RX ADMIN — LACTULOSE 20 GRAM(S): 10 SOLUTION ORAL at 11:18

## 2022-11-26 RX ADMIN — Medication 3.4 MICROGRAM(S)/KG/MIN: at 14:42

## 2022-11-26 RX ADMIN — POLYETHYLENE GLYCOL 3350 17 GRAM(S): 17 POWDER, FOR SOLUTION ORAL at 11:18

## 2022-11-26 RX ADMIN — PANTOPRAZOLE SODIUM 40 MILLIGRAM(S): 20 TABLET, DELAYED RELEASE ORAL at 11:18

## 2022-11-26 RX ADMIN — LACTULOSE 20 GRAM(S): 10 SOLUTION ORAL at 23:02

## 2022-11-26 NOTE — PROGRESS NOTE ADULT - SUBJECTIVE AND OBJECTIVE BOX
24 hr events:  off sedation with persistent poor mental status, obtunded  on lactulose for elevated ammonia level which is down trending  started on HD today for uremic encephalopathy   hypothermic this morning  remains on levophed  blood cx with morganella  LFT downtrending      ROS:  [x] unable to obtain due to intubation and encephalopathy    MEDICATIONS  (STANDING):  azithromycin  IVPB 500 milliGRAM(s) IV Intermittent every 24 hours  cefTRIAXone   IVPB 1000 milliGRAM(s) IV Intermittent every 24 hours    chlorhexidine 0.12% Liquid 15 milliLiter(s) Oral Mucosa every 12 hours  chlorhexidine 2% Cloths 1 Application(s) Topical <User Schedule>  lactulose Syrup 20 Gram(s) Oral every 6 hours  norepinephrine Infusion 0.05 MICROgram(s)/kG/Min (3.4 mL/Hr) IV Continuous <Continuous>  pantoprazole  Injectable 40 milliGRAM(s) IV Push daily        MEDICATIONS  (PRN):  sodium chloride 0.9% lock flush 10 milliLiter(s) IV Push every 1 hour PRN Pre/post blood products, medications, blood draw, and to maintain line patency    ##LABS  Complete Blood Count + Automated Diff (11.26.22 @ 04:20)    WBC Count: 16.51: CALLED TO CHECO THOMAS 11-26 520A K/uL    RBC Count: 2.77 M/uL    Hemoglobin: 8.3 g/dL    Hematocrit: 24.2 %    Mean Cell Volume: 87.4 fl    Mean Cell Hemoglobin: 30.0 pg    Mean Cell Hemoglobin Conc: 34.3 g/dL    Red Cell Distrib Width: 13.6 %    Platelet Count - Automated: 86: Verified on smear K/uL    Auto Neutrophil #: 15.68 K/uL    Auto Lymphocyte #: 0.17 K/uL    Auto Monocyte #: 0.50 K/uL    Auto Eosinophil #: 0.00 K/uL    Auto Basophil #: 0.00 K/uL    Auto Neutrophil %: 71.0: Differential percentages must be correlated with absolute numbers for  clinical significance. %    Auto Lymphocyte %: 1.0 %    Auto Monocyte %: 3.0 %    Auto Eosinophil %: 0.0 %    Auto Basophil %: 0.0 %    Nucleated RBC: N/A: Manual NRBC performed. /100 WBCs    Comprehensive Metabolic Panel (11.26.22 @ 04:20)    Sodium, Serum: 147 mmol/L    Potassium, Serum: 4.3 mmol/L    Chloride, Serum: 116 mmol/L    Carbon Dioxide, Serum: 12 mmol/L    Anion Gap, Serum: 19 mmol/L    Blood Urea Nitrogen, Serum: 163 mg/dL    Creatinine, Serum: 7.80 mg/dL    Glucose, Serum: 117 mg/dL    Calcium, Total Serum: 5.2 mg/dL    Protein Total, Serum: 4.6 gm/dL    Albumin, Serum: 1.8 g/dL    Bilirubin Total, Serum: 0.5 mg/dL    Alkaline Phosphatase, Serum: 909 U/L    Aspartate Aminotransferase (AST/SGOT): 2697 U/L    Alanine Aminotransferase (ALT/SGPT): 1421 U/L        Ammonia, Serum (11.26.22 @ 04:20)    Ammonia, Serum: 94 umol/L      Culture - Blood (11.25.22 @ 01:46)    -  Morganella morganii: Detec    Specimen Source: .Blood Blood-Peripheral    Organism: Blood Culture PCR    Organism: Morganella morganii    Culture Results: Growth in aerobic bottle: Morganella morganii    Culture - Sputum . (11.25.22 @ 11:56)    Specimen Source: ET Tube ET Tube    Culture Results: Normal Respiratory Mariah present    ##IMAGING  CXR < from: Xray Chest 1 View- PORTABLE-Urgent (Xray Chest 1 View- PORTABLE-Urgent .) (11.25.22 @ 10:54) >  Tubes inserted as above. Lower esophageal stent is noted.    Cavitary lesion right upper lobe      CT chest /abdomen/pelvis< from: CT Chest No Cont (11.25.22 @ 04:28) >  1. New large cavitary consolidation in the right upper lobe, likely   infectious in etiology. Differential diagnosis is cavitary mass.    2. Bilateral lower lobe posterior groundglass opacities with minimal   superimposed patchy opacities in the right lower lobe, nonspecific but   possibly infection.    3. Markedly limited evaluation of the abdomen due to lack of contrast and   lack of internal fat planes. Esophageal stent with tip in the stomach   with nodular soft tissue inseparable from the stent tip, which could   reflect adherent debris. Mass is not excluded.    4. Large colonic stool burden. Mildly dilated loops of air-filled small   bowel throughout the abdomen. Difficult to evaluate for transition point.   Findings could reflect ileus, though small bowel obstruction is not   excluded. Repeat with oral contrast can be considered for further   evaluation if there is clinical concern for obstruction.      ECHO < from: TTE Echo Complete w/o Contrast w/ Doppler (11.25.22 @ 13:01) >  1. Technically limited study.   2. Trivial pericardial effusion.   3. Mild tricuspid regurgitation.   4. Mild aortic regurgitation.   5. Grossly preserved left and right ventricular systolic function.        ##EXAM  GEN: thin, cachectic male, obtunded off sedation, orally intubated  HEENT: bilateral temporal wasting, ETT in place, NGT in place, L TLC  CV: RRR  PULM: mechanical breath sounds, no wheeze, no rhonchi  ABD: soft, + BS  EXT: no edema/cyanosis, R femoral HD catheter  NEURO: not on sedation, obtunded

## 2022-11-26 NOTE — PROGRESS NOTE ADULT - SUBJECTIVE AND OBJECTIVE BOX
NEPHROLOGY PROGRESS NOTE    CHIEF COMPLAINT:  YUNIEL/CKD    HPI:  Remains vented, poorly responsive, pressor requirement down a bit.  Poor urine output.      EXAM:  T(F): 97.4 (22 @ 12:00)  HR: 83 (22 @ 13:30)  BP: 111/70 (22 @ 12:00)  RR: 26 (22 @ 13:30)  SpO2: 100% (22 @ 12:30)    Unresponsive  Normal respiratory effort, lungs clear bilaterally  Heart RRR with no murmur, no peripheral edema         LABS                             8.3    16.51 )-----------( 86       ( 2022 04:20 )             24.2              147<H>  |  116<H>  |  163<H>  ----------------------------<  117<H>  4.3   |  12<L>  |  7.80<H>    Ca    5.2<LL>      2022 04:20  Phos  10.8       Mg     1.8         TPro  4.6<L>  /  Alb  1.8<L>  /  TBili  0.5  /  DBili  x   /  AST  2697<H>  /  ALT  1421<H>  /  AlkPhos  909      Lactate 2.3    ABG 7.70--15      Urinalysis Basic - ( 2022 05:15 )  Color: Yellow / Appearance: very cloudy / S.010 / pH: x  Gluc: x / Ketone: Trace  / Bili: Negative / Urobili: Negative mg/dL   Blood: x / Protein: 500 mg/dL / Nitrite: Negative   Leuk Esterase: Moderate / RBC: 3-5 /HPF / WBC >50   Sq Epi: x / Non Sq Epi: Few / Bacteria: Many      Assessment   YUNIEL septic shock; ischemic ATN; severe metabolic acidosis     Plan  Can consider dialytic trial now that hemodynamics improving

## 2022-11-26 NOTE — CHART NOTE - NSCHARTNOTEFT_GEN_A_CORE
FINDINGS:   Predominant A-Line artifacts throughout bilateral lung fields, good pleural sliding.   Systolic LV function preserved, RV size normal with preserved function- smaller than LV. Questionable septal wall abnormality.  No pericardial effusion.   IVC- < 1.0 cm.

## 2022-11-26 NOTE — PROGRESS NOTE ADULT - ASSESSMENT
62M PMH CKD 4 (on HD in past per family and regained renal function and did not require further HD), hx of alcohol and drug abuse per family (unknown if currently still using), chronic pancreatitis s/p [place] procedure, Hep C s/p SVR, esophageal stricture (post-stent 4/19/22 complicated by need for CRE balloon dilation, reposition and suture on 4/29/2022) presents from home for AMS, found unresponsive in basement. Intubated in ED for airway protection. Severe lab abnormalities K 7.4, BUN/Cr 167/8.9, BNP 74283, Alk phos 1279 AST 2956 ALT 1124, ammonia 316, Trop 754->1407, pH 6.8, bicarb 3, +UA. Hypotensive on levophed. Hypothermic. Blood cx grew morganella. CT chest with RUL cavitary lesion new compared to prior imaging.     DX: severe sepsis, septic shock, acute respiratory failure requiring intubation, shock liver, acute on chronic renal failure with ATN, PNA, RUL cavitary lesion, morganella bacteremia, severe metabolic acidosis, acute metabolic/hepatic/uremic encephalopathy, hyperkalemia, hyperammonemia    NEURO  has been off sedation  remains unresponsive  ammonia level improving- cont treatment for hepatic encephalopathy with lactulose, add rifaximin   started today on HD for concerns of uremic encephalopathy  CT head without acute pathology    CV  septic shock  on levophed for BP support  wean off levo as tolerates  maintain MAP > 65    PULM  RUL cavitary lesion  initially from [facility] but grew up in the [place] since [place]  no known recent exposure, lives at home  RUL cavitary lesion likely infectious   doubt TB but will check sputum AFB  initial sputum cx negative  will repeat sputum : staph/strep/pseudomonas infection can cause cavitary PNA  if sputum cx remains negative can consider bronch    ID  blood cx growing morganella  repeat sputum cx   follow up urine cx  currently on ceftriaxone  likely can d/c azithromycin if legionella negative  leukocytosis persists  monitor WBC trend  hypothermic this morning    RENAL  ARF on CKD  metabolic acidosis on bicarb drip  previously on HD  initiate HD today for uremia and metabolic acidosis  metabolic acidosis overall improved on bicarb drip    GI  shock liver likely in setting of septic shock with ischemic injury from hypoperfusion  LFTs down trending  ammonia level 300 on admission, now downtrending  cont with lactulose , add rifaximin  tube feed for nutritional support, increase as tolerates  s/p esophageal stent for esophageal stricture    GEN  full code  spoke to brother and sister in law at bedside today  family updated: consent obtained from brother for HD, HD catheter placement and bronch if needed  DVT ppx: heparin sc

## 2022-11-27 NOTE — PROGRESS NOTE ADULT - SUBJECTIVE AND OBJECTIVE BOX
NEPHROLOGY PROGRESS NOTE    CHIEF COMPLAINT:  YUNIEL    HPI:  Slightly more responsive today after 2.5 hrs HD yesterday.   mL.     EXAM:  T(F): 96.7 (11-27-22 @ 12:00)  HR: 76 (11-27-22 @ 13:00)  BP: --  RR: 13 (11-27-22 @ 13:00)  SpO2: 97% (11-27-22 @ 13:00)    Opens eyes to sternal rub  Normal respiratory effort, lungs clear bilaterally  Heart RRR with no murmur, ankle/feet edema         LABS                             9.8    25.90 )-----------( 51       ( 27 Nov 2022 08:40 )             26.3          11-27    147<H>  |  108  |  119<H>  ----------------------------<  96  3.1<L>   |  24  |  5.40<H>    Ca    5.3<LL>      27 Nov 2022 08:40  Phos  7.4     11-27  Mg     1.7     11-27    TPro  4.6<L>  /  Alb  1.6<L>  /  TBili  0.4  /  DBili  x   /  AST  872<H>  /  ALT  912<H>  /  AlkPhos  777<H>  11-27           Assessment   YUNIEL septic shock; ischemic ATN, still oliguric  Severe acidosis, resoved    Plan  HD # 2 today  Monitor neurologic response to clearance of urea

## 2022-11-27 NOTE — PROGRESS NOTE ADULT - ASSESSMENT
62M PMH CKD 4 (on HD in past per family and regained renal function and did not require further HD), hx of alcohol and drug abuse per family (unknown if currently still using), chronic pancreatitis s/p [place] procedure, Hep C s/p SVR, esophageal stricture (post-stent 4/19/22 complicated by need for CRE balloon dilation, reposition and suture on 4/29/2022) presents from home for AMS, found unresponsive in basement. Intubated in ED for airway protection. Severe lab abnormalities K 7.4, BUN/Cr 167/8.9, BNP 04506, Alk phos 1279 AST 2956 ALT 1124, ammonia 316, Trop 754->1407, pH 6.8, bicarb 3, +UA. Hypotensive on levophed. Hypothermic. Blood cx grew morganella. U cx grew klebsiella. CT chest with RUL cavitary lesion new compared to prior imaging.     DX: severe sepsis, septic shock, acute respiratory failure requiring intubation, shock liver, acute on chronic renal failure with ATN, PNA, RUL cavitary lesion, morganella bacteremia, klebsiella UTI, severe metabolic acidosis, acute metabolic/hepatic/uremic encephalopathy, hyperkalemia, hyperammonemia    NEURO  has been off sedation  initially completely unresponsive  ammonia level improving- cont treatment for hepatic encephalopathy with rifaximin and lactulose  started HD for concerns of uremic encephalopathy  mental status improving post HD  CT head without acute pathology    CV  septic shock  on levophed for BP support  wean off levo as tolerates  maintain MAP > 65    PULM  RUL cavitary lesion  initially from [facility] but [place]  no known recent exposure, lives at home  RUL cavitary lesion likely infectious   doubt TB but will check sputum AFB  initial sputum cx negative  will follow up repeat sputum sent yesterday  if sputum cx remains negative can consider bronch    ID  blood cx growing morganella  urine cx with klebsiella  repeat sputum cx pending  currently on ceftriaxone  still with leukocytosis  monitor WBC trend  afebrile    RENAL  ARF on CKD  previously on HD  HD catheter placed yesterday and initiated on HD session #1 yesterday 11/26  off bicarb drip since initiation of HD  metabolic acidosis improved  discussed with renal: will dialyze tomorrow  hypokalemia, hypomagnesemia, hypocalcemia on labs today: supplemented    GI  shock liver likely in setting of septic shock with ischemic injury from hypoperfusion  LFTs down trending  ammonia level 300 on admission, now downtrending  cont with lactulose and rifaximin  can d/c miralax  tube  for nutritional support, increase as tolerates  s/p esophageal stent for esophageal stricture    HEME  becoming thrombocytopenic  hold heparin sc  no evidence of active bleeding  monitor counts  likely related to underlying sepsis    GEN  full code  spoke to brother yesterday   family updated  DVT ppx: hold heparin sc, bilateral compression devices

## 2022-11-27 NOTE — PROGRESS NOTE ADULT - SUBJECTIVE AND OBJECTIVE BOX
24 hr events:  s/p HD yesterday  more arousable today but lethargic  remains off sedation  opens eyes to name call and touch  able to nod yes/no intermittently  not moving extremities on command  blood cx growing Morganella  urine cx growing klebsiella  tolerated some weaning today  LFTs improving  remains on levophed  thrombocytopenic  +BMs with lactulose/rifaximin- flexiseal in place  ammonia level decreasing    ROS:  [x] unable to obtain due to intubation and encephalopathy    MEDICATIONS  (STANDING):  azithromycin  IVPB 500 milliGRAM(s) IV Intermittent every 24 hours  cefTRIAXone   IVPB 1000 milliGRAM(s) IV Intermittent every 24 hours    chlorhexidine 0.12% Liquid 15 milliLiter(s) Oral Mucosa every 12 hours  chlorhexidine 2% Cloths 1 Application(s) Topical <User Schedule>  lactulose Syrup 20 Gram(s) Oral every 6 hours  norepinephrine Infusion 0.05 MICROgram(s)/kG/Min (3.4 mL/Hr) IV Continuous <Continuous>  pantoprazole  Injectable 40 milliGRAM(s) IV Push daily  propofol Infusion 10 MICROgram(s)/kG/Min (2.54 mL/Hr) IV Continuous <Continuous>  rifAXIMin 550 milliGRAM(s) Oral two times a day    MEDICATIONS  (PRN):  sodium chloride 0.9% lock flush 10 milliLiter(s) IV Push every 1 hour PRN Pre/post blood products, medications, blood draw, and to maintain line patency    ##LABS  Complete Blood Count (11.27.22 @ 08:40)    Nucleated RBC: 1 /100 WBCs    WBC Count: 25.90 K/uL    RBC Count: 3.24 M/uL    Hemoglobin: 9.8 g/dL    Hematocrit: 26.3 %    Mean Cell Volume: 81.2 fl    Mean Cell Hemoglobin: 30.2 pg    Mean Cell Hemoglobin Conc: 37.3: Specimen incubated at 37 degrees C g/dL    Red Cell Distrib Width: 13.7 %    Platelet Count - Automated: 51 K/uL    Comprehensive Metabolic Panel (11.27.22 @ 08:40)    Sodium, Serum: 147 mmol/L    Potassium, Serum: 3.1 mmol/L    Chloride, Serum: 108 mmol/L    Carbon Dioxide, Serum: 24 mmol/L    Anion Gap, Serum: 15 mmol/L    Blood Urea Nitrogen, Serum: 119 mg/dL    Creatinine, Serum: 5.40 mg/dL    Glucose, Serum: 96 mg/dL    Calcium, Total Serum: 5.3 mg/dL    Protein Total, Serum: 4.6 gm/dL    Albumin, Serum: 1.6 g/dL    Bilirubin Total, Serum: 0.4 mg/dL    Alkaline Phosphatase, Serum: 777 U/L    Aspartate Aminotransferase (AST/SGOT): 872 U/L    Alanine Aminotransferase (ALT/SGPT): 912 U/L      Ammonia, Serum (11.26.22 @ 04:20)    Ammonia, Serum: 94 umol/L          Culture - Urine (11.25.22 @ 09:00)    Specimen Source: Clean Catch Clean Catch (Midstream)    Culture Results: >100,000 CFU/ml Klebsiella pneumoniae     Culture - Blood (11.25.22 @ 01:46)    -  Morganella morganii: Detec    Specimen Source: .Blood Blood-Peripheral    Organism: Blood Culture PCR    Organism: Morganella morganii    Culture Results: Growth in aerobic bottle: Morganella morganii    Culture - Sputum . (11.25.22 @ 11:56)    Specimen Source: ET Tube ET Tube    Culture Results: Normal Respiratory Mariah present    ##IMAGING  CXR < from: Xray Chest 1 View- PORTABLE-Urgent (Xray Chest 1 View- PORTABLE-Urgent .) (11.25.22 @ 10:54) >  Tubes inserted as above. Lower esophageal stent is noted.    Cavitary lesion right upper lobe      CT chest /abdomen/pelvis< from: CT Chest No Cont (11.25.22 @ 04:28) >  1. New large cavitary consolidation in the right upper lobe, likely   infectious in etiology. Differential diagnosis is cavitary mass.    2. Bilateral lower lobe posterior groundglass opacities with minimal   superimposed patchy opacities in the right lower lobe, nonspecific but   possibly infection.    3. Markedly limited evaluation of the abdomen due to lack of contrast and   lack of internal fat planes. Esophageal stent with tip in the stomach   with nodular soft tissue inseparable from the stent tip, which could   reflect adherent debris. Mass is not excluded.    4. Large colonic stool burden. Mildly dilated loops of air-filled small   bowel throughout the abdomen. Difficult to evaluate for transition point.   Findings could reflect ileus, though small bowel obstruction is not   excluded. Repeat with oral contrast can be considered for further   evaluation if there is clinical concern for obstruction.      ECHO < from: TTE Echo Complete w/o Contrast w/ Doppler (11.25.22 @ 13:01) >  1. Technically limited study.   2. Trivial pericardial effusion.   3. Mild tricuspid regurgitation.   4. Mild aortic regurgitation.   5. Grossly preserved left and right ventricular systolic function.        ##EXAM  GEN: thin, cachectic male, lethargic but more arousable today, orally intubated  HEENT: bilateral temporal wasting, ETT in place, NGT in place, L TLC  CV: RRR  PULM: mechanical breath sounds, no wheeze, no rhonchi  ABD: soft, + BS  EXT: no edema/cyanosis, R femoral HD catheter  NEURO: not on sedation, remains lethargic more arousable today, opens eyes, intermittently nods to yes/no questions, not moving extremities on command

## 2022-11-28 NOTE — PROGRESS NOTE ADULT - SUBJECTIVE AND OBJECTIVE BOX
HPI:  HPI: 62 yr old M with hx (per chart) of chronic pancreatitis s/p pusestow procedure, CKD4, hep C s/p SVR, esophageal stricture w/ stent in April was found unresponsive in basement. Per EMS, brother said he was "acting altered and hasn't spoken to him in over 3 days." In ER patient unresponsive with severe metabolic abnormalities and intubated for airway protection. Labs notable for severe metabolic acidosis, hyperkalemia, elevated ammonia, & elevated troponins.         (25 Nov 2022 05:31)      24 hr events:    ## ROS:  [ ] unable to obtain  CONSTITUTIONAL: No fever, weight loss, or fatigue  EYES: No eye pain, visual disturbances, or discharge  ENMT:  No difficulty hearing, tinnitus, vertigo; No sinus or throat pain  NECK: No pain or stiffness  RESPIRATORY: No cough, wheezing, chills or hemoptysis; No shortness of breath  CARDIOVASCULAR: No chest pain, palpitations, dizziness, or leg swelling  GASTROINTESTINAL: No abdominal or epigastric pain. No nausea, vomiting, or hematemesis; No diarrhea or constipation. No melena or hematochezia.  GENITOURINARY: No dysuria, frequency, hematuria, or incontinence  NEUROLOGICAL: No headaches, memory loss, loss of strength, numbness, or tremors  SKIN: No itching, burning, rashes, or lesions   LYMPH NODES: No enlarged glands  ENDOCRINE: No heat or cold intolerance; No hair loss  MUSCULOSKELETAL: No joint pain or swelling; No muscle, back, or extremity pain  PSYCHIATRIC: No depression, anxiety, mood swings, or difficulty sleeping  HEME/LYMPH: No easy bruising, or bleeding gums  ALLERGY AND IMMUNOLOGIC: No hives or eczema    ## Vitals  ICU Vital Signs Last 24 Hrs  T(C): 36.1 (28 Nov 2022 08:15), Max: 36.1 (27 Nov 2022 22:00)  T(F): 97 (28 Nov 2022 08:15), Max: 97 (27 Nov 2022 22:00)  HR: 71 (28 Nov 2022 08:33) (70 - 97)  BP: --  BP(mean): --  ABP: 129/66 (28 Nov 2022 06:00) (86/52 - 165/84)  ABP(mean): 91 (28 Nov 2022 06:00) (66 - 110)  RR: 24 (28 Nov 2022 06:00) (13 - 26)  SpO2: 97% (28 Nov 2022 08:33) (94% - 99%)    O2 Parameters below as of 27 Nov 2022 19:00  Patient On (Oxygen Delivery Method): ventilator    O2 Concentration (%): 30        ## Physical Exam:  Gen:  HEENT:  Resp:  CV:  Abd:  Ext:  Neuro:    ## Vent Data  Mode: AC/ CMV (Assist Control/ Continuous Mandatory Ventilation)  RR (machine): 26  TV (machine): 430  FiO2: 30  PEEP: 5  ITime: 1  MAP: 10  PIP: 16      ## Labs:  Chem:  11-28    145  |  108  |  93<H>  ----------------------------<  102<H>  3.2<L>   |  24  |  4.50<H>    Ca    6.4<LL>      28 Nov 2022 04:00  Phos  5.7     11-28  Mg     2.2     11-28    TPro  4.6<L>  /  Alb  1.5<L>  /  TBili  0.4  /  DBili  x   /  AST  923<H>  /  ALT  917<H>  /  AlkPhos  1226<H>  11-28    LIVER FUNCTIONS - ( 28 Nov 2022 04:00 )  Alb: 1.5 g/dL / Pro: 4.6 gm/dL / ALK PHOS: 1226 U/L / ALT: 917 U/L / AST: 923 U/L / GGT: x           CBC:                        9.2    22.70 )-----------( 39       ( 28 Nov 2022 04:00 )             25.7     Coags:          ## Cardiac        ## Blood Gas  ABG - ( 27 Nov 2022 08:20 )  pH, Arterial: 7.47  pH, Blood: x     /  pCO2: 34    /  pO2: 106   / HCO3: 25    / Base Excess: 1.4   /  SaO2: 97.7                #I/Os  I&O's Detail    27 Nov 2022 07:01  -  28 Nov 2022 07:00  --------------------------------------------------------  IN:    IV PiggyBack: 350 mL    Nepro with Carb Steady: 400 mL    Norepinephrine: 350.1 mL  Total IN: 1100.1 mL    OUT:    Rectal Tube (mL): 1300 mL    Ureteral Catheter (mL): 260 mL  Total OUT: 1560 mL    Total NET: -459.9 mL          ## Imaging:    ## Medications:  MEDICATIONS  (STANDING):  azithromycin  IVPB      azithromycin  IVPB 500 milliGRAM(s) IV Intermittent every 24 hours  cefTRIAXone   IVPB 1000 milliGRAM(s) IV Intermittent every 24 hours  cefTRIAXone   IVPB      chlorhexidine 0.12% Liquid 15 milliLiter(s) Oral Mucosa every 12 hours  chlorhexidine 2% Cloths 1 Application(s) Topical <User Schedule>  lactulose Syrup 20 Gram(s) Oral every 6 hours  norepinephrine Infusion 0.05 MICROgram(s)/kG/Min (3.4 mL/Hr) IV Continuous <Continuous>  pantoprazole  Injectable 40 milliGRAM(s) IV Push daily  propofol Infusion 10 MICROgram(s)/kG/Min (2.54 mL/Hr) IV Continuous <Continuous>  rifAXIMin 550 milliGRAM(s) Oral two times a day    MEDICATIONS  (PRN):  sodium chloride 0.9% lock flush 10 milliLiter(s) IV Push every 1 hour PRN Pre/post blood products, medications, blood draw, and to maintain line patency       HPI:  HPI: 62 yr old M with hx (per chart) of chronic pancreatitis s/p pusestow procedure, CKD4, hep C s/p SVR, esophageal stricture w/ stent in April was found unresponsive in basement. Per EMS, brother said he was "acting altered and hasn't spoken to him in over 3 days." In ER patient unresponsive with severe metabolic abnormalities and intubated for airway protection. Labs notable for severe metabolic acidosis, hyperkalemia, elevated ammonia, & elevated troponins.         (25 Nov 2022 05:31)      24 hr events:    ## ROS:  [ ] unable to obtain  CONSTITUTIONAL: No fever, weight loss, or fatigue  EYES: No eye pain, visual disturbances, or discharge  ENMT:  No difficulty hearing, tinnitus, vertigo; No sinus or throat pain  NECK: No pain or stiffness  RESPIRATORY: No cough, wheezing, chills or hemoptysis; No shortness of breath  CARDIOVASCULAR: No chest pain, palpitations, dizziness, or leg swelling  GASTROINTESTINAL: No abdominal or epigastric pain. No nausea, vomiting, or hematemesis; No diarrhea or constipation. No melena or hematochezia.  GENITOURINARY: No dysuria, frequency, hematuria, or incontinence  NEUROLOGICAL: No headaches, memory loss, loss of strength, numbness, or tremors  SKIN: No itching, burning, rashes, or lesions   LYMPH NODES: No enlarged glands  ENDOCRINE: No heat or cold intolerance; No hair loss  MUSCULOSKELETAL: No joint pain or swelling; No muscle, back, or extremity pain  PSYCHIATRIC: No depression, anxiety, mood swings, or difficulty sleeping  HEME/LYMPH: No easy bruising, or bleeding gums  ALLERGY AND IMMUNOLOGIC: No hives or eczema    ## Vitals  ICU Vital Signs Last 24 Hrs  T(C): 36.1 (28 Nov 2022 08:15), Max: 36.1 (27 Nov 2022 22:00)  T(F): 97 (28 Nov 2022 08:15), Max: 97 (27 Nov 2022 22:00)  HR: 71 (28 Nov 2022 08:33) (70 - 97)  BP: --  BP(mean): --  ABP: 129/66 (28 Nov 2022 06:00) (86/52 - 165/84)  ABP(mean): 91 (28 Nov 2022 06:00) (66 - 110)  RR: 24 (28 Nov 2022 06:00) (13 - 26)  SpO2: 97% (28 Nov 2022 08:33) (94% - 99%)    O2 Parameters below as of 27 Nov 2022 19:00  Patient On (Oxygen Delivery Method): ventilator    O2 Concentration (%): 30        ## Physical Exam:  Gen: Elderly cachectic male lying in bed NAD  HEENT: NC/AT sclerae anicteric, ET Tube in place  Resp: Mechanical breath sounds b/l, overbreathing vent  CV: S1, S2  Abd: Soft, + BS  Ext: WWP  Neuro: Opens eyes, does not follow commands    ## Vent Data  Mode: AC/ CMV (Assist Control/ Continuous Mandatory Ventilation)  RR (machine): 26  TV (machine): 430  FiO2: 30  PEEP: 5  ITime: 1  MAP: 10  PIP: 16      ## Labs:  Chem:  11-28    145  |  108  |  93<H>  ----------------------------<  102<H>  3.2<L>   |  24  |  4.50<H>    Ca    6.4<LL>      28 Nov 2022 04:00  Phos  5.7     11-28  Mg     2.2     11-28    TPro  4.6<L>  /  Alb  1.5<L>  /  TBili  0.4  /  DBili  x   /  AST  923<H>  /  ALT  917<H>  /  AlkPhos  1226<H>  11-28    LIVER FUNCTIONS - ( 28 Nov 2022 04:00 )  Alb: 1.5 g/dL / Pro: 4.6 gm/dL / ALK PHOS: 1226 U/L / ALT: 917 U/L / AST: 923 U/L / GGT: x           CBC:                        9.2    22.70 )-----------( 39       ( 28 Nov 2022 04:00 )             25.7     Coags:          ## Cardiac        ## Blood Gas  ABG - ( 27 Nov 2022 08:20 )  pH, Arterial: 7.47  pH, Blood: x     /  pCO2: 34    /  pO2: 106   / HCO3: 25    / Base Excess: 1.4   /  SaO2: 97.7                #I/Os  I&O's Detail    27 Nov 2022 07:01  -  28 Nov 2022 07:00  --------------------------------------------------------  IN:    IV PiggyBack: 350 mL    Nepro with Carb Steady: 400 mL    Norepinephrine: 350.1 mL  Total IN: 1100.1 mL    OUT:    Rectal Tube (mL): 1300 mL    Ureteral Catheter (mL): 260 mL  Total OUT: 1560 mL    Total NET: -459.9 mL          ## Imaging:    ## Medications:  MEDICATIONS  (STANDING):  azithromycin  IVPB      azithromycin  IVPB 500 milliGRAM(s) IV Intermittent every 24 hours  cefTRIAXone   IVPB 1000 milliGRAM(s) IV Intermittent every 24 hours  cefTRIAXone   IVPB      chlorhexidine 0.12% Liquid 15 milliLiter(s) Oral Mucosa every 12 hours  chlorhexidine 2% Cloths 1 Application(s) Topical <User Schedule>  lactulose Syrup 20 Gram(s) Oral every 6 hours  norepinephrine Infusion 0.05 MICROgram(s)/kG/Min (3.4 mL/Hr) IV Continuous <Continuous>  pantoprazole  Injectable 40 milliGRAM(s) IV Push daily  propofol Infusion 10 MICROgram(s)/kG/Min (2.54 mL/Hr) IV Continuous <Continuous>  rifAXIMin 550 milliGRAM(s) Oral two times a day    MEDICATIONS  (PRN):  sodium chloride 0.9% lock flush 10 milliLiter(s) IV Push every 1 hour PRN Pre/post blood products, medications, blood draw, and to maintain line patency       HPI:  HPI: 62 yr old M with hx (per chart) of chronic pancreatitis s/p pusestow procedure, CKD4, hep C s/p SVR, esophageal stricture w/ stent in April was found unresponsive in basement. Per EMS, brother said he was "acting altered and hasn't spoken to him in over 3 days." In ER patient unresponsive with severe metabolic abnormalities and intubated for airway protection. Labs notable for severe metabolic acidosis, hyperkalemia, elevated ammonia, & elevated troponins.         (25 Nov 2022 05:31)      24 hr events:    ## ROS:  [x ] unable to obtain    ## Vitals  ICU Vital Signs Last 24 Hrs  T(C): 36.1 (28 Nov 2022 08:15), Max: 36.1 (27 Nov 2022 22:00)  T(F): 97 (28 Nov 2022 08:15), Max: 97 (27 Nov 2022 22:00)  HR: 71 (28 Nov 2022 08:33) (70 - 97)  BP: --  BP(mean): --  ABP: 129/66 (28 Nov 2022 06:00) (86/52 - 165/84)  ABP(mean): 91 (28 Nov 2022 06:00) (66 - 110)  RR: 24 (28 Nov 2022 06:00) (13 - 26)  SpO2: 97% (28 Nov 2022 08:33) (94% - 99%)    O2 Parameters below as of 27 Nov 2022 19:00  Patient On (Oxygen Delivery Method): ventilator    O2 Concentration (%): 30        ## Physical Exam:  Gen: Elderly cachectic male lying in bed NAD  HEENT: NC/AT sclerae anicteric, ET Tube in place  Resp: Mechanical breath sounds b/l, overbreathing vent  CV: S1, S2  Abd: Soft, + BS  Ext: WWP  Neuro: Opens eyes, does not follow commands    ## Vent Data  Mode: AC/ CMV (Assist Control/ Continuous Mandatory Ventilation)  RR (machine): 26  TV (machine): 430  FiO2: 30  PEEP: 5  ITime: 1  MAP: 10  PIP: 16      ## Labs:  Chem:  11-28    145  |  108  |  93<H>  ----------------------------<  102<H>  3.2<L>   |  24  |  4.50<H>    Ca    6.4<LL>      28 Nov 2022 04:00  Phos  5.7     11-28  Mg     2.2     11-28    TPro  4.6<L>  /  Alb  1.5<L>  /  TBili  0.4  /  DBili  x   /  AST  923<H>  /  ALT  917<H>  /  AlkPhos  1226<H>  11-28    LIVER FUNCTIONS - ( 28 Nov 2022 04:00 )  Alb: 1.5 g/dL / Pro: 4.6 gm/dL / ALK PHOS: 1226 U/L / ALT: 917 U/L / AST: 923 U/L / GGT: x           CBC:                        9.2    22.70 )-----------( 39       ( 28 Nov 2022 04:00 )             25.7     Coags:          ## Cardiac        ## Blood Gas  ABG - ( 27 Nov 2022 08:20 )  pH, Arterial: 7.47  pH, Blood: x     /  pCO2: 34    /  pO2: 106   / HCO3: 25    / Base Excess: 1.4   /  SaO2: 97.7                #I/Os  I&O's Detail    27 Nov 2022 07:01  -  28 Nov 2022 07:00  --------------------------------------------------------  IN:    IV PiggyBack: 350 mL    Nepro with Carb Steady: 400 mL    Norepinephrine: 350.1 mL  Total IN: 1100.1 mL    OUT:    Rectal Tube (mL): 1300 mL    Ureteral Catheter (mL): 260 mL  Total OUT: 1560 mL    Total NET: -459.9 mL          ## Imaging:    ## Medications:  MEDICATIONS  (STANDING):  azithromycin  IVPB      azithromycin  IVPB 500 milliGRAM(s) IV Intermittent every 24 hours  cefTRIAXone   IVPB 1000 milliGRAM(s) IV Intermittent every 24 hours  cefTRIAXone   IVPB      chlorhexidine 0.12% Liquid 15 milliLiter(s) Oral Mucosa every 12 hours  chlorhexidine 2% Cloths 1 Application(s) Topical <User Schedule>  lactulose Syrup 20 Gram(s) Oral every 6 hours  norepinephrine Infusion 0.05 MICROgram(s)/kG/Min (3.4 mL/Hr) IV Continuous <Continuous>  pantoprazole  Injectable 40 milliGRAM(s) IV Push daily  propofol Infusion 10 MICROgram(s)/kG/Min (2.54 mL/Hr) IV Continuous <Continuous>  rifAXIMin 550 milliGRAM(s) Oral two times a day    MEDICATIONS  (PRN):  sodium chloride 0.9% lock flush 10 milliLiter(s) IV Push every 1 hour PRN Pre/post blood products, medications, blood draw, and to maintain line patency

## 2022-11-28 NOTE — DIETITIAN INITIAL EVALUATION ADULT - PERTINENT MEDS FT
MEDICATIONS  (STANDING):  azithromycin  IVPB      azithromycin  IVPB 500 milliGRAM(s) IV Intermittent every 24 hours  cefTRIAXone   IVPB      cefTRIAXone   IVPB 1000 milliGRAM(s) IV Intermittent every 24 hours  chlorhexidine 0.12% Liquid 15 milliLiter(s) Oral Mucosa every 12 hours  chlorhexidine 2% Cloths 1 Application(s) Topical <User Schedule>  dexMEDEtomidine Infusion 0.5 MICROgram(s)/kG/Hr (5.29 mL/Hr) IV Continuous <Continuous>  lactulose Syrup 20 Gram(s) Oral every 6 hours  norepinephrine Infusion 0.05 MICROgram(s)/kG/Min (3.4 mL/Hr) IV Continuous <Continuous>  pantoprazole  Injectable 40 milliGRAM(s) IV Push daily  propofol Infusion 10 MICROgram(s)/kG/Min (2.54 mL/Hr) IV Continuous <Continuous>  rifAXIMin 550 milliGRAM(s) Oral two times a day    MEDICATIONS  (PRN):  sodium chloride 0.9% lock flush 10 milliLiter(s) IV Push every 1 hour PRN Pre/post blood products, medications, blood draw, and to maintain line patency

## 2022-11-28 NOTE — PROGRESS NOTE ADULT - SUBJECTIVE AND OBJECTIVE BOX
[facility] NEPHROLOGY SERVICES, [facility]  NEPHROLOGY AND HYPERTENSION  [address]  [address]  [place] [address]  [phone]    MD HALIE OLIVIA MD ANDREY GONCHARUK, MD MADHU KORRAPATI, MD YELENA ROSENBERG, MD BINNY KOSHY, MD CHRISTOPHER CAPUTO, MD JANICE ARREOLA MD          Patient events noted  No distress      MEDICATIONS  (STANDING):  azithromycin  IVPB      azithromycin  IVPB 500 milliGRAM(s) IV Intermittent every 24 hours  cefTRIAXone   IVPB 1000 milliGRAM(s) IV Intermittent every 24 hours  cefTRIAXone   IVPB      chlorhexidine 0.12% Liquid 15 milliLiter(s) Oral Mucosa every 12 hours  chlorhexidine 2% Cloths 1 Application(s) Topical <User Schedule>  dexMEDEtomidine Infusion 0.5 MICROgram(s)/kG/Hr (5.29 mL/Hr) IV Continuous <Continuous>  lactulose Syrup 20 Gram(s) Oral every 6 hours  norepinephrine Infusion 0.05 MICROgram(s)/kG/Min (3.4 mL/Hr) IV Continuous <Continuous>  pantoprazole  Injectable 40 milliGRAM(s) IV Push daily  propofol Infusion 10 MICROgram(s)/kG/Min (2.54 mL/Hr) IV Continuous <Continuous>  rifAXIMin 550 milliGRAM(s) Oral two times a day    MEDICATIONS  (PRN):  sodium chloride 0.9% lock flush 10 milliLiter(s) IV Push every 1 hour PRN Pre/post blood products, medications, blood draw, and to maintain line patency      11-27-22 @ 07:01  -  11-28-22 @ 07:00  --------------------------------------------------------  IN: 1125.7 mL / OUT: 1560 mL / NET: -434.3 mL    11-28-22 @ 07:01  -  11-28-22 @ 14:21  --------------------------------------------------------  IN: 262.6 mL / OUT: 0 mL / NET: 262.6 mL      PHYSICAL EXAM:      T(C): 36 (11-28-22 @ 11:00), Max: 36.1 (11-27-22 @ 22:00)  HR: 87 (11-28-22 @ 13:45) (62 - 97)  BP: --  RR: 19 (11-28-22 @ 13:45) (8 - 26)  SpO2: 99% (11-28-22 @ 13:45) (90% - 99%)  Wt(kg): --  Lungs clear  Heart S1S2  Abd soft NT ND  Extremities:   1 edema                                    9.2    22.70 )-----------( 39       ( 28 Nov 2022 04:00 )             25.7     11-28    147<H>  |  112<H>  |  87<H>  ----------------------------<  98  3.0<L>   |  22  |  4.50<H>    Ca    6.8<L>corrected to 8.8      28 Nov 2022 12:20  Phos  5.6     11-28  Mg     2.1     11-28    TPro  4.6<L>  /  Alb  1.5<L>  /  TBili  0.4  /  DBili  x   /  AST  923<H>  /  ALT  917<H>  /  AlkPhos  1226<H>  11-28    ABG - ( 27 Nov 2022 08:20 )  pH, Arterial: 7.47  pH, Blood: x     /  pCO2: 34    /  pO2: 106   / HCO3: 25    / Base Excess: 1.4   /  SaO2: 97.7              LIVER FUNCTIONS - ( 28 Nov 2022 04:00 )  Alb: 1.5 g/dL / Pro: 4.6 gm/dL / ALK PHOS: 1226 U/L / ALT: 917 U/L / AST: 923 U/L / GGT: x           Creatinine Trend: 4.50<--, 4.50<--, 4.07<--, 5.40<--, 5.44<--, 7.80<--  Mode: CPAP with PS  FiO2: 30  PEEP: 5  PS: 5  ITime: 0.9  MAP: 6  PIP: 10      Assessment   YUNIEL CKD 4; septic shock; ischemic ATN, still oliguric  Suspected DIC; doubt TMA related renal injury.      Plan  HD # 3 today  Will follow clinical course;         Austin Dukes MD

## 2022-11-28 NOTE — PROGRESS NOTE ADULT - ASSESSMENT
61 y/o M w/HCV, emphysema, and chronic pancreatitis admitted for altered mental status. Intubated for acute respiratory failure. Hypotension likely secondary to severe sepsis with septic shock likely secondary to PNA and kelbsiella UTI. CT chest with new cavitary lung lesion (not present on scan 8/30/22) favor cavitary PNA over malignancy due to rapid development. YUNIEL on CKD likely ATN. NSTEMI. Acute liver injury, likely shock liver.  Hyperkalemia.     - Sedation as needed goal RASS -1 to 0  - Continue mechanical ventilation  - Plan for bronchoscopy for sputum sample  - Titrate pressors as needed goal MAP >= 65  - Heparin gtt, cardiology consult  - HD as per renal  - Broaden abx  - Trend LFT, avoid hepatotoxins  - RUQ US  - Full code    I have personally provided 45 minutes of attending critical care time excluding procedures. 63 y/o M w/HCV, emphysema, and chronic pancreatitis admitted for altered mental status. Intubated for acute respiratory failure. Hypotension likely secondary to severe sepsis with septic shock likely secondary to psuedomonal PNA, morganella bacteremia and kelbsiella UTI. CT chest with new cavitary lung lesion (not present on scan 8/30/22) favor cavitary PNA over malignancy due to rapid development. YUNIEL on CKD likely ATN. NSTEMI. Acute liver injury, likely shock liver.  Hyperkalemia.     - Sedation as needed goal RASS -1 to 0  - Continue mechanical ventilation  - Titrate pressors as needed goal MAP >= 65  - Heparin gtt, cardiology consult  - HD as per renal  - Broaden abx  - Trend LFT, avoid hepatotoxins  - RUQ US  - Full code    I have personally provided 45 minutes of attending critical care time excluding procedures. 61 y/o M w/HCV, emphysema, and chronic pancreatitis admitted for altered mental status. Intubated for acute respiratory failure. Hypotension likely secondary to severe sepsis with septic shock likely secondary to psuedomonal PNA, morganella bacteremia and kelbsiella UTI. CT chest with new cavitary lung lesion (not present on scan 8/30/22) favor cavitary PNA over malignancy due to rapid development. YUNIEL on CKD likely ATN. NSTEMI. Acute liver injury, likely shock liver.  Hyperkalemia.     - Sedation as needed goal RASS -1 to 0  - SAT/SBT  - Titrate pressors as needed goal MAP >= 65  - Heparin gtt, cardiology consult  - HD as per renal  - Broaden abx  - Trend LFT, avoid hepatotoxins  - RUQ US  - Full code    I have personally provided 45 minutes of attending critical care time excluding procedures. 61 y/o M w/HCV, emphysema, and chronic pancreatitis admitted for altered mental status. Intubated for acute respiratory failure. Hypotension likely secondary to severe sepsis with septic shock likely secondary to psuedomonal PNA, morganella bacteremia and kelbsiella UTI. CT chest with new cavitary lung lesion (not present on scan 8/30/22) favor cavitary PNA over malignancy due to rapid development. YUNIEL on CKD likely ATN. NSTEMI. Acute liver injury, likely shock liver.  Hyperkalemia.     - Sedation as needed goal RASS -1 to 0  - SAT/SBT  - Titrate pressors as needed goal MAP >= 65  - Heparin gtt, cardiology consult  - HD as per renal  - Broaden abx  - Trend LFT, avoid hepatotoxins  - Full code    I have personally provided 45 minutes of attending critical care time excluding procedures.

## 2022-11-28 NOTE — CONSULT NOTE ADULT - SUBJECTIVE AND OBJECTIVE BOX
Cardiology Initial Consult    [facility] at [place]  [address] [place] [address], [place] [address]  Office: [phone]  Fax: [phone]    CHIEF COMPLAINT:      HISTORY OF PRESENT ILLNESS:  62M CKD, hx of HCV, chronic pancreatitis, esophageal stricture s/p stent who was found unresponsive in the setting of AMS for several days prior, found to have severe metabolic abnormalities and intubated for airway protection.    Pt with elevated SCr, shock liver, acidosis.  Pt with agitation that required increasing sedation.    Allergies  Tylenol (Unknown)  Intolerances      MEDICATIONS:  norepinephrine Infusion 0.05 MICROgram(s)/kG/Min IV Continuous <Continuous>  azithromycin  IVPB      azithromycin  IVPB 500 milliGRAM(s) IV Intermittent every 24 hours  cefTRIAXone   IVPB 1000 milliGRAM(s) IV Intermittent every 24 hours  cefTRIAXone   IVPB      rifAXIMin 550 milliGRAM(s) Oral two times a day  dexMEDEtomidine Infusion 0.5 MICROgram(s)/kG/Hr IV Continuous <Continuous>  propofol Infusion 10 MICROgram(s)/kG/Min IV Continuous <Continuous>  lactulose Syrup 20 Gram(s) Oral every 6 hours  pantoprazole  Injectable 40 milliGRAM(s) IV Push daily  chlorhexidine 0.12% Liquid 15 milliLiter(s) Oral Mucosa every 12 hours  chlorhexidine 2% Cloths 1 Application(s) Topical <User Schedule>  sodium chloride 0.9% lock flush 10 milliLiter(s) IV Push every 1 hour PRN    PAST MEDICAL & SURGICAL HISTORY:  ETOH abuse  sober x1 year approximately  Pancreatitis  Hepatitis C  treated  Gout  HTN (hypertension)  Chronic kidney disease (CKD)  H/O chronic pancreatitis  Gout  Alcohol abuse  Gastric perforation    FAMILY HISTORY:  FH: HTN (hypertension)    SOCIAL HISTORY:    unable to obtain    Review of Systems:  Unable to obtain    PHYSICAL EXAM:  T(C): 36 (11-28-22 @ 11:00), Max: 36.1 (11-27-22 @ 22:00)  HR: 87 (11-28-22 @ 13:45) (62 - 97)  BP: --  RR: 19 (11-28-22 @ 13:45) (8 - 26)  SpO2: 99% (11-28-22 @ 13:45) (90% - 99%)  Wt(kg): --  I&O's Summary    27 Nov 2022 07:01  -  28 Nov 2022 07:00  --------------------------------------------------------  IN: 1125.7 mL / OUT: 1560 mL / NET: -434.3 mL    28 Nov 2022 07:01  -  28 Nov 2022 14:50  --------------------------------------------------------  IN: 262.6 mL / OUT: 0 mL / NET: 262.6 mL    Appearance: No acute distress  HEENT:   ETT present  Cardiovascular: Normal S1 S2, no elevated JVP, no murmurs  Respiratory: Lungs clear to auscultation	, good air movement  Psychiatry: calm  Gastrointestinal:  soft nt	  Skin: No rashes, no ecchymoses, no cyanosis	  Neurologic: awake, does not follow commands    LABS:	 	  CBC Full  -  ( 28 Nov 2022 04:00 )  WBC Count : 22.70 K/uL  Hemoglobin : 9.2 g/dL  Hematocrit : 25.7 %  Platelet Count - Automated : 39 K/uL    11-28  147<H>  |  112<H>  |  87<H>  ----------------------------<  98  3.0<L>   |  22  |  4.50<H>    11-28  145  |  108  |  93<H>  ----------------------------<  102<H>  3.2<L>   |  24  |  4.50<H>    Ca    6.8<L>      28 Nov 2022 12:20  Ca    6.4<LL>      28 Nov 2022 04:00  Phos  5.6     11-28  Phos  5.7     11-28  Mg     2.1     11-28  Mg     2.2     11-28    TPro  4.6<L>  /  Alb  1.5<L>  /  TBili  0.4  /  DBili  x   /  AST  923<H>  /  ALT  917<H>  /  AlkPhos  1226<H>  11-28  TPro  4.6<L>  /  Alb  1.6<L>  /  TBili  0.4  /  DBili  x   /  AST  872<H>  /  ALT  912<H>  /  AlkPhos  777<H>  11-27      proBNP:   Lipid Profile:   HgA1c:   TSH:     CARDIAC MARKERS:  Troponin I, High Sensitivity Result: 8570.4 ng/L (11-28-22 @ 12:20)  Troponin I, High Sensitivity Result: 8882.8 ng/L (11-28-22 @ 04:00)    TELEMETRY: 	  SR  ECG:  	  RADIOLOGY:  OTHER: 	    PREVIOUS DIAGNOSTIC TESTING:    [x] Echocardiogram: < from: TTE Echo Complete w/o Contrast w/ Doppler (11.25.22 @ 13:01) >  Summary:   1. Technically limited study.   2. Trivial pericardial effusion.   3. Mild tricuspid regurgitation.   4. Mild aortic regurgitation.   5. Grossly preserved left and right ventricular systolic function.    < end of copied text >    [ ] Catheterization:  [ ] Stress Test:

## 2022-11-28 NOTE — CONSULT NOTE ADULT - SUBJECTIVE AND OBJECTIVE BOX
Full note to follow  Possible necrotizing pneumonia as source of bacteremia.  Wasted appearance but no clear or convincing source of SSTI  No perforation noted related to stent or otherwise, though CT was without IV or enteral contrast  Sputum and urine discordant with blood isolate  Has groin HD cath, not present on admission  Concern that lung process may be polymicrobial abscess  I don't think we need to invoke TB here  Would broaden ceftriaxone to Zosyn and stop azithro  Dose meds for YUNIEL  Weaning per pulmonary  Thank you for the courtesy of this referral.  Branden Cavazos MD  Attending Physician  [facility]  Division of Infectious Diseases  [phone]  --------------------  [facility]  Division of Infectious Diseases  [phone]    IMELDA ROBERTSON  62y, Male  [id number]    HPI--      PMH/PSH--  ETOH abuse    Pancreatitis    Hepatitis C    Gout    HTN (hypertension)    Chronic kidney disease (CKD)    H/O chronic pancreatitis    Gout    Alcohol abuse    No significant past surgical history    Gastric perforation    Gastric perforation        Allergies--  Tylenol (Unknown)      Medications--  Antibiotics: azithromycin  IVPB      azithromycin  IVPB 500 milliGRAM(s) IV Intermittent every 24 hours  cefTRIAXone   IVPB 1000 milliGRAM(s) IV Intermittent every 24 hours  cefTRIAXone   IVPB      rifAXIMin 550 milliGRAM(s) Oral two times a day    Immunologic:   Other: chlorhexidine 0.12% Liquid  chlorhexidine 2% Cloths  dexMEDEtomidine Infusion  lactulose Syrup  norepinephrine Infusion  pantoprazole  Injectable  propofol Infusion  sodium chloride 0.9% lock flush PRN    Antimicrobials last 90 days per EMR: MEDICATIONS  (STANDING):    azithromycin  IVPB   255 mL/Hr IV Intermittent (11-28-22 @ 05:37)   255 mL/Hr IV Intermittent (11-27-22 @ 07:00)   255 mL/Hr IV Intermittent (11-26-22 @ 07:31)    azithromycin  IVPB   500 milliGRAM(s) IV Intermittent (11-25-22 @ 09:15)    cefTRIAXone   IVPB   100 mL/Hr IV Intermittent (11-28-22 @ 05:35)   100 mL/Hr IV Intermittent (11-27-22 @ 06:31)   100 mL/Hr IV Intermittent (11-26-22 @ 07:31)    cefTRIAXone   IVPB   1000 milliGRAM(s) IV Intermittent (11-25-22 @ 08:49)    piperacillin/tazobactam IVPB.   200 mL/Hr IV Intermittent (11-25-22 @ 02:22)    piperacillin/tazobactam IVPB..   25 mL/Hr IV Intermittent (11-25-22 @ 04:52)    rifAXIMin   550 milliGRAM(s) Oral (11-28-22 @ 05:34)   550 milliGRAM(s) Oral (11-27-22 @ 17:57)   550 milliGRAM(s) Oral (11-27-22 @ 07:00)   550 milliGRAM(s) Oral (11-26-22 @ 17:44)   550 milliGRAM(s) Oral (11-26-22 @ 12:16)    vancomycin  IVPB.   100 mL/Hr IV Intermittent (11-25-22 @ 03:06)        Social History--  EtOH: denies   Tobacco: denies   Drug Use: denies     Family/Marital History--  No pertinent family history in first degree relatives    FH: HTN (hypertension)    FH: hypertension (Father)          Travel/Environmental/Occupational History:      Review of Systems:  A >=10-point review of systems was obtained.     Pertinent positives and negatives--  Constitutional: No fevers. No Chills. No Rigors.   Eyes:  ENMT:  Cardiovascular: No chest pain. No palpitations.  Respiratory: No shortness of breath. No cough.  Gastrointestinal: No nausea or vomiting. No diarrhea or constipation.   Genitourinary:  Musculoskeletal:  Skin:  Neurologic:  Psychiatric: Pleasant. Appropriate affect.  Endocrine:  Heme/Lymphatic:  Allergy/Immunologic:    Review of systems otherwise negative except as previously noted.    Physical Exam--  Vital Signs: T(F): 96.8 (11-28-22 @ 11:00), Max: 97 (11-27-22 @ 22:00)  HR: 87 (11-28-22 @ 13:45)  BP: --  RR: 19 (11-28-22 @ 13:45)  SpO2: 99% (11-28-22 @ 13:45)  Wt(kg): --  General: Nontoxic-appearing Male in no acute distress.  HEENT: AT/NC. PERRL. EOMI. Anicteric. Conjunctiva pink and moist. Oropharynx clear. Dentition fair.  Neck: Not rigid. No sense of mass.  Nodes: None palpable.  Lungs: Clear bilaterally without rales, wheezing or rhonchi  Heart: Regular rate and rhythm. No Murmur. No rub. No gallop. No palpable thrill.  Abdomen: Bowel sounds present and normoactive. Soft. Nondistended. Nontender. No sense of mass. No organomegaly.  Back: No spinal tenderness. No costovertebral angle tenderness.   Extremities: No cyanosis or clubbing. No edema.   Skin: Warm. Dry. Good turgor. No rash. No vasculitic stigmata.  Psychiatric: Appropriate affect and mood for situation.         Laboratory & Imaging Data--  CBC                        9.2    22.70 )-----------( 39       ( 28 Nov 2022 04:00 )             25.7     WBC trend  WBC Count: 22.70 K/uL (11-28-22 @ 04:00)  WBC Count: 25.90 K/uL (11-27-22 @ 08:40)  WBC Count: 22.78 K/uL (11-27-22 @ 02:24)  WBC Count: 16.51 K/uL (11-26-22 @ 04:20)  WBC Count: 8.60 K/uL (11-25-22 @ 20:15)  WBC Count: 7.85 K/uL (11-25-22 @ 01:46)      Chemistries  11-28    147<H>  |  112<H>  |  87<H>  ----------------------------<  98  3.0<L>   |  22  |  4.50<H>    Ca    6.8<L>      28 Nov 2022 12:20  Phos  5.6     11-28  Mg     2.1     11-28    TPro  4.6<L>  /  Alb  1.5<L>  /  TBili  0.4  /  DBili  x   /  AST  923<H>  /  ALT  917<H>  /  AlkPhos  1226<H>  11-28      Culture Data    Culture - Acid Fast - Sputum w/Smear (collected 27 Nov 2022 15:00)  Source: ET Tube ET Tube    Culture - Sputum (collected 26 Nov 2022 12:20)  Source: ET Tube ET Tube  Gram Stain (prelim) (27 Nov 2022 20:31):    Moderate polymorphonuclear leukocytes per low power field    No Squamous epithelial cells per low power field    Rare Gram Positive Cocci in Clusters per oil power field  Preliminary Report (27 Nov 2022 20:31):    Few Pseudomonas aeruginosa    Normal Respiratory Mariah present    Culture - Sputum (collected 25 Nov 2022 11:56)  Source: ET Tube ET Tube  Gram Stain (27 Nov 2022 17:47):    Few polymorphonuclear leukocytes per low power field    No Squamous epithelial cells per low power field    Rare Gram Negative Rods per oil power field    Rare Gram Positive Cocci in Pairs and Chains per oil power field  Final Report (27 Nov 2022 17:47):    Normal Respiratory Mariah present    Culture - Urine (collected 25 Nov 2022 09:00)  Source: Clean Catch Clean Catch (Midstream)  Final Report (27 Nov 2022 15:22):    >100,000 CFU/ml Klebsiella pneumoniae  Organism: Klebsiella pneumoniae (27 Nov 2022 15:22)  Organism: Klebsiella pneumoniae (27 Nov 2022 15:22)    Culture - Blood (collected 25 Nov 2022 01:46)  Source: .Blood Blood-Peripheral  Gram Stain (27 Nov 2022 17:20):    Growth in aerobic bottle: Gram Negative Rods  Final Report (27 Nov 2022 17:20):    Growth in aerobic bottle: Morganella morganii    ***Blood Panel PCR results on this specimen are available    approximately 3 hours after the Gram stain result.***    Gram stain, PCR, and/or culture results may not always    correspond due to difference inmethodologies.    ************************************************************    This PCR assay was performed by multiplex PCR. This    Assay tests for 66 bacterial and resistance gene targets.    Please refer to the [facility] test directory    [email or web address] for details.  Organism: Blood Culture PCR  Morganella morganii (27 Nov 2022 17:20)  Organism: Morganella morganii (27 Nov 2022 17:20)  Organism: Blood Culture PCR (27 Nov 2022 17:20)    Culture - Blood (collected 25 Nov 2022 01:46)  Source: .Blood Blood-Peripheral  Gram Stain (27 Nov 2022 18:21):    Growth in anaerobic bottle: Gram Negative Rods  Final Report (27 Nov 2022 18:21):    Growth in anaerobic bottle: Morganella morganii    See previous culture [id number]    < from: CT Abdomen and Pelvis No Cont (11.25.22 @ 04:28) >    ACC: [id number] EXAM:  CT ABDOMEN AND PELVIS                        ACC: [id number] EXAM:  CT CHEST                          PROCEDURE DATE:  11/25/2022          INTERPRETATION:  CLINICAL INFORMATION: Altered mental status, found down,   failure to thrive.    COMPARISON: 8/30/2022    CONTRAST/COMPLICATIONS:  IV Contrast: None  Oral Contrast: None  Complications: None    PROCEDURE:  CT of the Chest, Abdomen and Pelvis was performed.  Sagittal and coronal reformats were performed.    FINDINGS:  Evaluation of solid organs and vascular structures is limited without   intravenous contrast. Evaluation is markedly limited due to lack of fat   planes.    CHEST:  LUNGS AND LARGE AIRWAYS: Endotracheal tube with tip above the anyi.   Secretions in the trachea, right mainstem bronchus, bronchus intermedius   and a distal right lower lobe airway. 4.5 x 3.3 x 4.7 cm cavitary   consolidation in the right upper lobe, new since prior. Mild surrounding   groundglass haziness. Emphysema. Posterior right lower lobe groundglass   and minimal superimposed patchy consolidations. Mild left lower lobe   posterior    Opacities.  PLEURA: No pleural effusion.  VESSELS: Atherosclerotic changes of the aorta and coronary arteries.  HEART: Heart size is normal. No pericardial effusion.  MEDIASTINUM AND LAKE: No lymphadenopathy. Stent within the distal   esophagus extending to the stomach.  CHEST WALL AND LOWER NECK: Left central venous catheter with tip in the   SVC appear    ABDOMEN AND PELVIS: Markedly limitedevaluation due to lack of   intra-abdominal fat planes.  LIVER: Grossly within normal limits.  BILE DUCTS: No definite marked dilatation.  GALLBLADDER: Within normal limits.  SPLEEN: Within normal limits.  PANCREAS: Extensive pancreatic calcifications compatible with chronic   pancreatitis.  ADRENALS: Within normal limits.  KIDNEYS/URETERS: No renal stone or definite hydronephrosis. Left renal   cyst.    BLADDER: Presumed to be decompressed around a Kenney catheter noted within   the pelvis.  REPRODUCTIVE ORGANS: Prostate not enlarged.    BOWEL: Esophageal stent with tip in the stomach. Nodular soft tissue   material inseparable from the stent tip (2, 26). Appendix not identified.   Large colonic stool burden. Mildly dilated air-filled loops of small   bowel. Distended stomach.  PERITONEUM: No ascites.  VESSELS: Atherosclerotic changes.  RETROPERITONEUM/LYMPH NODES: No lymphadenopathy.  ABDOMINAL WALL: Cachexia.  BONES: No acute osseous abnormality.    IMPRESSION:    1. New large cavitary consolidation in the right upper lobe, likely   infectious in etiology. Differential diagnosis is cavitary mass.    2. Bilateral lower lobe posterior groundglass opacities with minimal   superimposed patchy opacities in the right lower lobe, nonspecific but   possibly infection.    3. Markedly limited evaluation of the abdomen due to lack of contrast and   lack of internal fat planes. Esophageal stent with tip in the stomach   with nodular soft tissue inseparable from the stent tip, which could   reflect adherent debris. Mass is not excluded.    4. Large colonic stool burden. Mildly dilated loops of air-filled small   bowel throughout the abdomen. Difficult to evaluate for transition point.   Findings could reflect ileus, though small bowel obstruction is not   excluded. Repeat with oral contrast can be considered for further   evaluation if there is clinical concern for obstruction.    --- End of Report ---      BRITTANY ARMENDARIZ MD; Attending Radiologist  This document has been electronically signed. Nov 25 2022  5:03AM    < end of copied text >   Full note to follow  Possible necrotizing pneumonia as source of bacteremia.  Wasted appearance but no clear or convincing source of SSTI  No perforation noted related to stent or otherwise, though CT was without IV or enteral contrast  Sputum and urine discordant with blood isolate  Has groin HD cath, not present on admission  Concern that lung process may be polymicrobial abscess  I don't think we need to invoke TB here  Would broaden ceftriaxone to Zosyn and stop azithro  Dose meds for YUNIEL  Weaning per pulmonary  Thank you for the courtesy of this referral.  Branden Cavazos MD  Attending Physician  [facility]  Division of Infectious Diseases  [phone]  --------------------  [facility]  Division of Infectious Diseases  [phone]    IMELDA ROBERTSON  62y, Male  [id number]    HPI--  62M intubated on ventilator. Hx from chart and prior records. As per HPI:  HPI: 62 yr old M with hx (per chart) of chronic pancreatitis s/p pusestow procedure, CKD4, hep C s/p SVR, esophageal stricture w/ stent in April was found unresponsive in basement. Per EMS, brother said he was "acting altered and hasn't spoken to him in over 3 days." In ER patient unresponsive with severe metabolic abnormalities and intubated for airway protection. Labs notable for severe metabolic acidosis, hyperkalemia, elevated ammonia, & elevated troponins.         (25 Nov 2022 05:31)    Patient on imaging with cavitary area on CT chest, no other clear potential acute pathology on CT imaging. Patient with Morganella septicemia, YUNIEL, elevated LFT presumed related to sepsis/shock.     PMH/PSH--  ETOH abuse    Pancreatitis    Hepatitis C    Gout    HTN (hypertension)    Chronic kidney disease (CKD)    H/O chronic pancreatitis    Gout    Alcohol abuse    No significant past surgical history    Gastric perforation    Gastric perforation        Allergies--  Tylenol (Unknown)      Medications--  Antibiotics: azithromycin  IVPB      azithromycin  IVPB 500 milliGRAM(s) IV Intermittent every 24 hours  cefTRIAXone   IVPB 1000 milliGRAM(s) IV Intermittent every 24 hours  cefTRIAXone   IVPB      rifAXIMin 550 milliGRAM(s) Oral two times a day    Immunologic:   Other: chlorhexidine 0.12% Liquid  chlorhexidine 2% Cloths  dexMEDEtomidine Infusion  lactulose Syrup  norepinephrine Infusion  pantoprazole  Injectable  propofol Infusion  sodium chloride 0.9% lock flush PRN    Antimicrobials last 90 days per EMR: MEDICATIONS  (STANDING):    azithromycin  IVPB   255 mL/Hr IV Intermittent (11-28-22 @ 05:37)   255 mL/Hr IV Intermittent (11-27-22 @ 07:00)   255 mL/Hr IV Intermittent (11-26-22 @ 07:31)    azithromycin  IVPB   500 milliGRAM(s) IV Intermittent (11-25-22 @ 09:15)    cefTRIAXone   IVPB   100 mL/Hr IV Intermittent (11-28-22 @ 05:35)   100 mL/Hr IV Intermittent (11-27-22 @ 06:31)   100 mL/Hr IV Intermittent (11-26-22 @ 07:31)    cefTRIAXone   IVPB   1000 milliGRAM(s) IV Intermittent (11-25-22 @ 08:49)    piperacillin/tazobactam IVPB.   200 mL/Hr IV Intermittent (11-25-22 @ 02:22)    piperacillin/tazobactam IVPB..   25 mL/Hr IV Intermittent (11-25-22 @ 04:52)    rifAXIMin   550 milliGRAM(s) Oral (11-28-22 @ 05:34)   550 milliGRAM(s) Oral (11-27-22 @ 17:57)   550 milliGRAM(s) Oral (11-27-22 @ 07:00)   550 milliGRAM(s) Oral (11-26-22 @ 17:44)   550 milliGRAM(s) Oral (11-26-22 @ 12:16)    vancomycin  IVPB.   100 mL/Hr IV Intermittent (11-25-22 @ 03:06)        Social History--  EtOH: EtOH absue   Tobacco: unknown  Drug Use: unknown    Family/Marital History--  No pertinent family history in first degree relatives    FH: HTN (hypertension)    FH: hypertension (Father)        Review of Systems:  Review of systems unable secondary to clinical condition.    Physical Exam--  Vital Signs: T(F): 96.8 (11-28-22 @ 11:00), Max: 97 (11-27-22 @ 22:00)  HR: 87 (11-28-22 @ 13:45)  BP: --  RR: 19 (11-28-22 @ 13:45)  SpO2: 99% (11-28-22 @ 13:45)  Wt(kg): --  General: CHronically ill-appearing Male in no acute distress.  HEENT: Bitemporal wasting. Anicteric. Conjunctiva pink and moist. OETT.  Neck: Not rigid. No sense of mass. CVL C/D/I.  Nodes: None palpable.  Lungs: Diminished BS B  Heart: Regular rate and rhythm.   Abdomen: Bowel sounds present and normoactive. Soft. Nondistended. Nontender. No sense of mass. No organomegaly.  Back: Unable  Extremities: No cyanosis or clubbing. 2+ edema. R femoral HD cath C/D/I  Skin: Warm. Dry. Good turgor. No rash. No vasculitic stigmata.  Psychiatric: Unable      Laboratory & Imaging Data--  CBC                        9.2    22.70 )-----------( 39       ( 28 Nov 2022 04:00 )             25.7     WBC trend  WBC Count: 22.70 K/uL (11-28-22 @ 04:00)  WBC Count: 25.90 K/uL (11-27-22 @ 08:40)  WBC Count: 22.78 K/uL (11-27-22 @ 02:24)  WBC Count: 16.51 K/uL (11-26-22 @ 04:20)  WBC Count: 8.60 K/uL (11-25-22 @ 20:15)  WBC Count: 7.85 K/uL (11-25-22 @ 01:46)      Chemistries  11-28    147<H>  |  112<H>  |  87<H>  ----------------------------<  98  3.0<L>   |  22  |  4.50<H>    Ca    6.8<L>      28 Nov 2022 12:20  Phos  5.6     11-28  Mg     2.1     11-28    TPro  4.6<L>  /  Alb  1.5<L>  /  TBili  0.4  /  DBili  x   /  AST  923<H>  /  ALT  917<H>  /  AlkPhos  1226<H>  11-28      Culture Data    Culture - Acid Fast - Sputum w/Smear (collected 27 Nov 2022 15:00)  Source: ET Tube ET Tube    Culture - Sputum (collected 26 Nov 2022 12:20)  Source: ET Tube ET Tube  Gram Stain (prelim) (27 Nov 2022 20:31):    Moderate polymorphonuclear leukocytes per low power field    No Squamous epithelial cells per low power field    Rare Gram Positive Cocci in Clusters per oil power field  Preliminary Report (27 Nov 2022 20:31):    Few Pseudomonas aeruginosa    Normal Respiratory Mariah present    Culture - Sputum (collected 25 Nov 2022 11:56)  Source: ET Tube ET Tube  Gram Stain (27 Nov 2022 17:47):    Few polymorphonuclear leukocytes per low power field    No Squamous epithelial cells per low power field    Rare Gram Negative Rods per oil power field    Rare Gram Positive Cocci in Pairs and Chains per oil power field  Final Report (27 Nov 2022 17:47):    Normal Respiratory Mariah present    Culture - Urine (collected 25 Nov 2022 09:00)  Source: Clean Catch Clean Catch (Midstream)  Final Report (27 Nov 2022 15:22):    >100,000 CFU/ml Klebsiella pneumoniae  Organism: Klebsiella pneumoniae (27 Nov 2022 15:22)  Organism: Klebsiella pneumoniae (27 Nov 2022 15:22)    Culture - Blood (collected 25 Nov 2022 01:46)  Source: .Blood Blood-Peripheral  Gram Stain (27 Nov 2022 17:20):    Growth in aerobic bottle: Gram Negative Rods  Final Report (27 Nov 2022 17:20):    Growth in aerobic bottle: Morganella morganii    ***Blood Panel PCR results on this specimen are available    approximately 3 hours after the Gram stain result.***    Gram stain, PCR, and/or culture results may not always    correspond due to difference inmethodologies.    ************************************************************    This PCR assay was performed by multiplex PCR. This    Assay tests for 66 bacterial and resistance gene targets.    Please refer to the [facility] test directory    [email or web address] for details.  Organism: Blood Culture PCR  Morganella morganii (27 Nov 2022 17:20)  Organism: Morganella morganii (27 Nov 2022 17:20)  Organism: Blood Culture PCR (27 Nov 2022 17:20)    Culture - Blood (collected 25 Nov 2022 01:46)  Source: .Blood Blood-Peripheral  Gram Stain (27 Nov 2022 18:21):    Growth in anaerobic bottle: Gram Negative Rods  Final Report (27 Nov 2022 18:21):    Growth in anaerobic bottle: Morganella morganii    See previous culture [id number]    < from: CT Abdomen and Pelvis No Cont (11.25.22 @ 04:28) >    ACC: [id number] EXAM:  CT ABDOMEN AND PELVIS                        ACC: [id number] EXAM:  CT CHEST                          PROCEDURE DATE:  11/25/2022          INTERPRETATION:  CLINICAL INFORMATION: Altered mental status, found down,   failure to thrive.    COMPARISON: 8/30/2022    CONTRAST/COMPLICATIONS:  IV Contrast: None  Oral Contrast: None  Complications: None    PROCEDURE:  CT of the Chest, Abdomen and Pelvis was performed.  Sagittal and coronal reformats were performed.    FINDINGS:  Evaluation of solid organs and vascular structures is limited without   intravenous contrast. Evaluation is markedly limited due to lack of fat   planes.    CHEST:  LUNGS AND LARGE AIRWAYS: Endotracheal tube with tip above the anyi.   Secretions in the trachea, right mainstem bronchus, bronchus intermedius   and a distal right lower lobe airway. 4.5 x 3.3 x 4.7 cm cavitary   consolidation in the right upper lobe, new since prior. Mild surrounding   groundglass haziness. Emphysema. Posterior right lower lobe groundglass   and minimal superimposed patchy consolidations. Mild left lower lobe   posterior    Opacities.  PLEURA: No pleural effusion.  VESSELS: Atherosclerotic changes of the aorta and coronary arteries.  HEART: Heart size is normal. No pericardial effusion.  MEDIASTINUM AND LAKE: No lymphadenopathy. Stent within the distal   esophagus extending to the stomach.  CHEST WALL AND LOWER NECK: Left central venous catheter with tip in the   SVC appear    ABDOMEN AND PELVIS: Markedly limitedevaluation due to lack of   intra-abdominal fat planes.  LIVER: Grossly within normal limits.  BILE DUCTS: No definite marked dilatation.  GALLBLADDER: Within normal limits.  SPLEEN: Within normal limits.  PANCREAS: Extensive pancreatic calcifications compatible with chronic   pancreatitis.  ADRENALS: Within normal limits.  KIDNEYS/URETERS: No renal stone or definite hydronephrosis. Left renal   cyst.    BLADDER: Presumed to be decompressed around a Kenney catheter noted within   the pelvis.  REPRODUCTIVE ORGANS: Prostate not enlarged.    BOWEL: Esophageal stent with tip in the stomach. Nodular soft tissue   material inseparable from the stent tip (2, 26). Appendix not identified.   Large colonic stool burden. Mildly dilated air-filled loops of small   bowel. Distended stomach.  PERITONEUM: No ascites.  VESSELS: Atherosclerotic changes.  RETROPERITONEUM/LYMPH NODES: No lymphadenopathy.  ABDOMINAL WALL: Cachexia.  BONES: No acute osseous abnormality.    IMPRESSION:    1. New large cavitary consolidation in the right upper lobe, likely   infectious in etiology. Differential diagnosis is cavitary mass.    2. Bilateral lower lobe posterior groundglass opacities with minimal   superimposed patchy opacities in the right lower lobe, nonspecific but   possibly infection.    3. Markedly limited evaluation of the abdomen due to lack of contrast and   lack of internal fat planes. Esophageal stent with tip in the stomach   with nodular soft tissue inseparable from the stent tip, which could   reflect adherent debris. Mass is not excluded.    4. Large colonic stool burden. Mildly dilated loops of air-filled small   bowel throughout the abdomen. Difficult to evaluate for transition point.   Findings could reflect ileus, though small bowel obstruction is not   excluded. Repeat with oral contrast can be considered for further   evaluation if there is clinical concern for obstruction.    --- End of Report ---      BRITTANY ARMENDARIZ MD; Attending Radiologist  This document has been electronically signed. Nov 25 2022  5:03AM    < end of copied text >

## 2022-11-28 NOTE — DIETITIAN INITIAL EVALUATION ADULT - OTHER INFO
Pt seen in ICU w hx chronic pancreatitis & emphysema, adm for AMS. Pt is intubated for acute respiratory failure. HD as per Renal for today. Pt w/ multiple hospitalizations. Pt is presently receiving Nepro via NGT @ 20 ml/hr = 864 kcal, 39 g pro, 350 ml free water and tolerating well. No family at bedside. Unable to perform Nfpe at this time.  Pt seen in ICU w hx chronic pancreatitis & emphysema, adm for AMS. Pt is intubated for acute respiratory failure. HD as per Renal for today. Pt w/ multiple hospitalizations. Pt is presently receiving Nepro via NGT @ 20 ml/hr = 864 kcal, 39 g pro, 350 ml free water and tolerating well. No family at bedside. Unable to perform Nfpe at this time. Pt intubated for Acute Respiratory failure.

## 2022-11-28 NOTE — CONSULT NOTE ADULT - ASSESSMENT
Possible necrotizing pneumonia as source of bacteremia.  Wasted appearance but no clear or convincing source of SSTI  No perforation noted related to stent or otherwise, though CT was without IV or enteral contrast  Sputum and urine discordant with blood isolate  Has groin HD cath, not present on admission  Concern that lung process may be polymicrobial abscess  I don't think we need to invoke TB here  Would broaden ceftriaxone to Zosyn and stop azithro  Dose meds for YUNIEL  Weaning per pulmonary  Thank you for the courtesy of this referral.  Branden Cavazos MD  Attending Physician  [facility]  Division of Infectious Diseases  [phone]

## 2022-11-28 NOTE — DIETITIAN INITIAL EVALUATION ADULT - SIGNS/SYMPTOMS
Goal rate for TF has not yet been achieved Pressure ulcer stage 2 or > ; Adult FTT Chronic pancreatitis ; Multiple Hospitalizations

## 2022-11-28 NOTE — CONSULT NOTE ADULT - ASSESSMENT
Troponin not plateauing  Unclear if stress related or represents ACS  repeat TTE  full consult to follow 62M CKD, hx of HCV, chronic pancreatitis, esophageal stricture s/p stent who was found unresponsive in the setting of AMS for several days prior, found to have severe metabolic abnormalities and intubated for airway protection.    Significant troponin elevation though grossly preserved LV function; suspect may be due to prolonged hypotension/stress response and decreased renal function.    Check CK as there may also be a component of skeletal myonecrosis.  consider repeat limited TTE just to evaluate systolic function  would hold off on ACS-specific treatment joel given thrombocytopenia and anemia, though would consider starting asa 81mg daily when platelet count recovers

## 2022-11-28 NOTE — DIETITIAN INITIAL EVALUATION ADULT - NS FNS DIET ORDER
Diet, NPO with Tube Feed:   Tube Feeding Modality: Nasogastric  Nepro with Carb Steady  Total Volume for 24 Hours (mL): 480  Continuous  Starting Tube Feed Rate {mL per Hour}: 20  Until Goal Tube Feed Rate (mL per Hour): 20  Tube Feed Duration (in Hours): 24  Tube Feed Start Time: 12:00 (11-26-22 @ 11:39)

## 2022-11-28 NOTE — DIETITIAN INITIAL EVALUATION ADULT - PERTINENT LABORATORY DATA
11-28    147<H>  |  112<H>  |  87<H>  ----------------------------<  98  3.0<L>   |  22  |  4.50<H>    Ca    6.8<L>      28 Nov 2022 12:20  Phos  5.6     11-28  Mg     2.1     11-28    TPro  4.6<L>  /  Alb  1.5<L>  /  TBili  0.4  /  DBili  x   /  AST  923<H>  /  ALT  917<H>  /  AlkPhos  1226<H>  11-28  POCT Blood Glucose.: 88 mg/dL (11-28-22 @ 05:27)  A1C with Estimated Average Glucose Result: 5.7 % (09-01-22 @ 08:58)  A1C with Estimated Average Glucose Result: 5.5 % (04-29-22 @ 05:36)  A1C with Estimated Average Glucose Result: 5.5 % (02-09-22 @ 17:47)

## 2022-11-29 NOTE — PROGRESS NOTE ADULT - SUBJECTIVE AND OBJECTIVE BOX
[facility]PHR[facility]  NEPHROLOGY AND HYPERTENSION  [address]  [address]  [place] [address]  [phone]    MD HALIE OLIVIA, MD DHRUV REMY, MD ROGERIO ESPOSITO, MD SHERON JUAREZ, MD JANICE ARREOLA MD          Patient events noted    MEDICATIONS  (STANDING):  chlorhexidine 0.12% Liquid 15 milliLiter(s) Oral Mucosa every 12 hours  chlorhexidine 2% Cloths 1 Application(s) Topical <User Schedule>  dexMEDEtomidine Infusion 0.5 MICROgram(s)/kG/Hr (5.29 mL/Hr) IV Continuous <Continuous>  lactulose Syrup 20 Gram(s) Oral every 6 hours  midodrine 10 milliGRAM(s) Oral every 8 hours  norepinephrine Infusion 0.05 MICROgram(s)/kG/Min (3.4 mL/Hr) IV Continuous <Continuous>  pantoprazole  Injectable 40 milliGRAM(s) IV Push daily  rifAXIMin 550 milliGRAM(s) Oral two times a day    MEDICATIONS  (PRN):  sodium chloride 0.9% lock flush 10 milliLiter(s) IV Push every 1 hour PRN Pre/post blood products, medications, blood draw, and to maintain line patency      11-28-22 @ 07:01  -  11-29-22 @ 07:00  --------------------------------------------------------  IN: 858.7 mL / OUT: 1445 mL / NET: -586.3 mL    11-29-22 @ 07:01  -  11-29-22 @ 22:35  --------------------------------------------------------  IN: 654.5 mL / OUT: 400 mL / NET: 254.5 mL      PHYSICAL EXAM:      T(C): 36.1 (11-29-22 @ 19:26), Max: 37.7 (11-29-22 @ 08:00)  HR: 64 (11-29-22 @ 21:04) (54 - 114)  BP: --  RR: 18 (11-29-22 @ 20:30) (13 - 25)  SpO2: 98% (11-29-22 @ 21:04) (88% - 100%)  Wt(kg): --  Lungs clear  Heart S1S2  Abd soft NT ND  Extremities:   tr edema                                    8.4    23.42 )-----------( 38       ( 29 Nov 2022 02:30 )             24.4     11-29    143  |  109<H>  |  74<H>  ----------------------------<  102<H>  3.2<L>   |  24  |  3.73<H>    Ca    7.2<L>      29 Nov 2022 02:30  Phos  5.3     11-29  Mg     2.1     11-29    TPro  4.3<L>  /  Alb  1.4<L>  /  TBili  0.2  /  DBili  x   /  AST  373<H>  /  ALT  640<H>  /  AlkPhos  967<H>  11-29      LIVER FUNCTIONS - ( 29 Nov 2022 02:30 )  Alb: 1.4 g/dL / Pro: 4.3 gm/dL / ALK PHOS: 967 U/L / ALT: 640 U/L / AST: 373 U/L / GGT: x           Creatinine Trend: 3.73<--, 4.50<--, 4.50<--, 4.07<--, 5.40<--, 5.44<--  Mode: AC/ CMV (Assist Control/ Continuous Mandatory Ventilation)  RR (machine): 16  TV (machine): 440  FiO2: 30  PEEP: 5  ITime: 1  MAP: 8  PIP: 23          Assessment   YUNIEL CKD 4; septic shock; ischemic ATN  Suspected DIC; doubt TMA related renal injury.  Increasing UO      Plan  Holding HD, trend UO and Cr trend  Will follow clinical course;     Austin Dukes MD

## 2022-11-29 NOTE — PROGRESS NOTE ADULT - SUBJECTIVE AND OBJECTIVE BOX
Cardiology Progress Note    Interval Events:  Remains intubated  calm      MEDICATIONS:  midodrine 10 milliGRAM(s) Oral every 8 hours  norepinephrine Infusion 0.05 MICROgram(s)/kG/Min IV Continuous <Continuous>  piperacillin/tazobactam IVPB.- 3.375 Gram(s) IV Intermittent once  rifAXIMin 550 milliGRAM(s) Oral two times a day  dexMEDEtomidine Infusion 0.5 MICROgram(s)/kG/Hr IV Continuous <Continuous>  midazolam Injectable 2 milliGRAM(s) IV Push once  lactulose Syrup 20 Gram(s) Oral every 6 hours  pantoprazole  Injectable 40 milliGRAM(s) IV Push daily  chlorhexidine 0.12% Liquid 15 milliLiter(s) Oral Mucosa every 12 hours  chlorhexidine 2% Cloths 1 Application(s) Topical <User Schedule>  sodium chloride 0.9% lock flush 10 milliLiter(s) IV Push every 1 hour PRN    PHYSICAL EXAM:  T(C): 37.7 (11-29-22 @ 08:00), Max: 37.7 (11-29-22 @ 08:00)  HR: 87 (11-29-22 @ 10:30) (54 - 114)  BP: --  RR: 17 (11-29-22 @ 10:30) (7 - 26)  SpO2: 97% (11-29-22 @ 10:30) (89% - 100%)  Wt(kg): --  I&O's Summary    28 Nov 2022 07:01  -  29 Nov 2022 07:00  --------------------------------------------------------  IN: 858.7 mL / OUT: 1445 mL / NET: -586.3 mL    29 Nov 2022 07:01  -  29 Nov 2022 10:53  --------------------------------------------------------  IN: 130.3 mL / OUT: 60 mL / NET: 70.3 mL      Appearance: No acute distress  HEENT:   ETT present  Cardiovascular: Normal S1 S2, no elevated JVP, no murmurs  Respiratory: Lungs clear to auscultation	, good air movement  Psychiatry: calm  Gastrointestinal:  soft nt	  Skin: No rashes, no ecchymoses, no cyanosis	  Neurologic: arousable    LABS:	 	  CBC Full  -  ( 29 Nov 2022 02:30 )  WBC Count : 23.42 K/uL  Hemoglobin : 8.4 g/dL  Hematocrit : 24.4 %  Platelet Count - Automated : 38 K/uL      11-29  143  |  109<H>  |  74<H>  ----------------------------<  102<H>  3.2<L>   |  24  |  3.73<H>  11-28    147<H>  |  112<H>  |  87<H>  ----------------------------<  98  3.0<L>   |  22  |  4.50<H>    Ca    7.2<L>      29 Nov 2022 02:30  Ca    6.8<L>      28 Nov 2022 12:20  Phos  5.3     11-29  Phos  5.6     11-28  Mg     2.1     11-29  Mg     2.1     11-28    TPro  4.3<L>  /  Alb  1.4<L>  /  TBili  0.2  /  DBili  x   /  AST  373<H>  /  ALT  640<H>  /  AlkPhos  967<H>  11-29  TPro  4.6<L>  /  Alb  1.5<L>  /  TBili  0.4  /  DBili  x   /  AST  923<H>  /  ALT  917<H>  /  AlkPhos  1226<H>  11-28      proBNP:   Lipid Profile:   HgA1c:   TSH:     CARDIAC MARKERS:    Troponin I, High Sensitivity Result: 8570.4 ng/L (11-28-22 @ 12:20)  Troponin I, High Sensitivity Result: 8882.8 ng/L (11-28-22 @ 04:00)  Troponin I, High Sensitivity Result: 8534.2 ng/L (11-27-22 @ 19:10)    TELEMETRY: 	  SR  ECG:  	  RADIOLOGY:  OTHER: 	    PREVIOUS DIAGNOSTIC TESTING:    [ ] Echocardiogram:   [ ] Catheterization:  [ ] Stress Test:

## 2022-11-29 NOTE — PROGRESS NOTE ADULT - ASSESSMENT
62M CKD, hx of HCV, chronic pancreatitis, esophageal stricture s/p stent who was found unresponsive in the setting of AMS for several days prior, found to have severe metabolic abnormalities and intubated for airway protection.    Significant troponin elevation though grossly preserved LV function; suspect may be due to prolonged hypotension/stress response and decreased renal function. Troponin trended down.    would hold off on ACS-specific treatment joel given thrombocytopenia and anemia, though would consider starting asa 81mg daily when platelet count recovers

## 2022-11-29 NOTE — PROGRESS NOTE ADULT - ASSESSMENT
Possible necrotizing pneumonia as source of bacteremia.  Wasted appearance but no clear or convincing source of SSTI  No perforation noted related to stent or otherwise, though CT was without IV or enteral contrast  Sputum and urine discordant with blood isolate  Has groin HD cath, not present on admission  Concern that lung process may be polymicrobial abscess  I don't think we need to invoke TB here    11/29: WBC in about the same range, can't expect much change in WBC from a dose or two, FiO2 only 30%. Hopefully some moderation in YUNIEL. LFT improving as well.    Suggestions  Zosyn, renally dosed  Remove HD cath, replace at new site if needed. Femoral lines with higher risk of CLABSI overall.   Weaning per pulmonary  Trend CBC  Given persistent leukocytosis would repeat blood culture data  Decrease pressors as able    Branden Cavazos MD  Attending Physician  [facility]  Division of Infectious Diseases  [phone]

## 2022-11-29 NOTE — PROGRESS NOTE ADULT - SUBJECTIVE AND OBJECTIVE BOX
[facility]  Division of Infectious Diseases  [phone]    Name: IMELDA ROBERTSON  Age: 62y  Gender: Male  MRN: [id number]    Interval History--  Notes reviewed. Seen earlier today. D/W CCM PA.  Remains intubated on vent.       Past Medical History--  ETOH abuse    Pancreatitis    Hepatitis C    Gout    HTN (hypertension)    Chronic kidney disease (CKD)    H/O chronic pancreatitis    Gout    Alcohol abuse    No significant past surgical history    Gastric perforation    Gastric perforation        For details regarding the patient's social history, family history, and other miscellaneous elements, please refer the initial infectious diseases consultation and/or the admitting history and physical examination for this admission.    Allergies    Tylenol (Unknown)    Intolerances        Medications--  Antibiotics:  rifAXIMin 550 milliGRAM(s) Oral two times a day    Immunologic:    Other:  chlorhexidine 0.12% Liquid  chlorhexidine 2% Cloths  dexMEDEtomidine Infusion  lactulose Syrup  midodrine  norepinephrine Infusion  pantoprazole  Injectable  sodium chloride 0.9% lock flush PRN      Review of Systems--  Review of systems unable secondary to clinical condition.     Physical Examination--  Vital Signs: T(F): 97.1 (11-29-22 @ 15:55), Max: 99.9 (11-29-22 @ 08:00)  HR: 64 (11-29-22 @ 17:00)  BP: --  RR: 16 (11-29-22 @ 17:00)  SpO2: 98% (11-29-22 @ 17:00)  Wt(kg): --  General: CHronically ill-appearing Male in no acute distress.  HEENT: Bitemporal wasting. Anicteric. Conjunctiva pink and moist. OETT.  Neck: Not rigid. No sense of mass. CVL C/D/I.  Nodes: None palpable.  Lungs: Diminished BS B  Heart: Regular rate and rhythm.   Abdomen: Bowel sounds present and normoactive. Soft. Nondistended. Nontender. No sense of mass. No organomegaly.  Back: Unable.   Extremities: No cyanosis or clubbing. 1+ edema. R femoral HD cath C/D/I  Skin: Warm. Dry. Good turgor. No rash. No vasculitic stigmata.  Psychiatric: Appropriate affect and mood for situation.         Laboratory Studies--  CBC                        8.4    23.42 )-----------( 38       ( 29 Nov 2022 02:30 )             24.4     WBC trend  WBC Count: 23.42 K/uL (11-29-22 @ 02:30)  WBC Count: 22.70 K/uL (11-28-22 @ 04:00)  WBC Count: 25.90 K/uL (11-27-22 @ 08:40)  WBC Count: 22.78 K/uL (11-27-22 @ 02:24)  WBC Count: 16.51 K/uL (11-26-22 @ 04:20)  WBC Count: 8.60 K/uL (11-25-22 @ 20:15)  WBC Count: 7.85 K/uL (11-25-22 @ 01:46)      Chemistries  11-29    143  |  109<H>  |  74<H>  ----------------------------<  102<H>  3.2<L>   |  24  |  3.73<H>    Creatinine, Serum: 4.50 mg/dL (11.28.22 @ 12:20)        Ca    7.2<L>      29 Nov 2022 02:30  Phos  5.3     11-29  Mg     2.1     11-29    LFT Trend  Total Bilirubin  0.2 mg/dL (11-29-22 @ 02:30)  0.4 mg/dL (11-28-22 @ 04:00)  0.4 mg/dL (11-27-22 @ 08:40)  0.3 mg/dL (11-27-22 @ 02:24)  0.5 mg/dL (11-26-22 @ 04:20)  0.6 mg/dL (11-25-22 @ 20:15)  0.5 mg/dL (11-25-22 @ 01:46)    Direct Bilirubin    Indirect Bilirubin    AKP  967 U/L (11-29-22 @ 02:30)  1226 U/L (11-28-22 @ 04:00)  777 U/L (11-27-22 @ 08:40)  846 U/L (11-27-22 @ 02:24)  909 U/L (11-26-22 @ 04:20)  1059 U/L (11-25-22 @ 20:15)  1279 U/L (11-25-22 @ 01:46)    AST  373 U/L (11-29-22 @ 02:30)  923 U/L (11-28-22 @ 04:00)  872 U/L (11-27-22 @ 08:40)  1102 U/L (11-27-22 @ 02:24)  2697 U/L (11-26-22 @ 04:20)  3772 U/L (11-25-22 @ 20:15)  2956 U/L (11-25-22 @ 01:46)    ALT  640 U/L (11-29-22 @ 02:30)  917 U/L (11-28-22 @ 04:00)  912 U/L (11-27-22 @ 08:40)  1008 U/L (11-27-22 @ 02:24)  1421 U/L (11-26-22 @ 04:20)  1574 U/L (11-25-22 @ 20:15)  1124 U/L (11-25-22 @ 01:46)        Culture Data    Culture - Acid Fast - Sputum w/Smear (collected 27 Nov 2022 15:00)  Source: ET Tube ET Tube    Culture - Sputum (collected 26 Nov 2022 12:20)  Source: ET Tube ET Tube  Gram Stain (28 Nov 2022 15:48):    Moderate polymorphonuclear leukocytes per low power field    No Squamous epithelial cells per low power field    Rare Gram Positive Cocci in Clusters per oil power field  Final Report (28 Nov 2022 15:48):    Few Pseudomonas aeruginosa    Normal Respiratory Mariah present  Organism: Pseudomonas aeruginosa (28 Nov 2022 15:48)  Organism: Pseudomonas aeruginosa (28 Nov 2022 15:48)    Culture - Sputum (collected 25 Nov 2022 11:56)  Source: ET Tube ET Tube  Gram Stain (27 Nov 2022 17:47):    Few polymorphonuclear leukocytes per low power field    No Squamous epithelial cells per low power field    Rare Gram Negative Rods per oil power field    Rare Gram Positive Cocci in Pairs and Chains per oil power field  Final Report (27 Nov 2022 17:47):    Normal Respiratory Mariah present    Culture - Urine (collected 25 Nov 2022 09:00)  Source: Clean Catch Clean Catch (Midstream)  Final Report (27 Nov 2022 15:22):    >100,000 CFU/ml Klebsiella pneumoniae  Organism: Klebsiella pneumoniae (27 Nov 2022 15:22)  Organism: Klebsiella pneumoniae (27 Nov 2022 15:22)    Culture - Blood (collected 25 Nov 2022 01:46)  Source: .Blood Blood-Peripheral  Gram Stain (27 Nov 2022 17:20):    Growth in aerobic bottle: Gram Negative Rods  Final Report (27 Nov 2022 17:20):    Growth in aerobic bottle: Morganella morganii    ***Blood Panel PCR results on this specimen are available    approximately 3 hours after the Gram stain result.***    Gram stain, PCR, and/or culture results may not always    correspond due to difference inmethodologies.    ************************************************************    This PCR assay was performed by multiplex PCR. This    Assay tests for 66 bacterial and resistance gene targets.    Please refer to the [facility] test directory    [email or web address] for details.  Organism: Blood Culture PCR  Morganella morganii (27 Nov 2022 17:20)  Organism: Morganella morganii (27 Nov 2022 17:20)  Organism: Blood Culture PCR (27 Nov 2022 17:20)    Culture - Blood (collected 25 Nov 2022 01:46)  Source: .Blood Blood-Peripheral  Gram Stain (27 Nov 2022 18:21):    Growth in anaerobic bottle: Gram Negative Rods  Final Report (27 Nov 2022 18:21):    Growth in anaerobic bottle: Morganella morganii    See previous culture [id number]

## 2022-11-29 NOTE — PROGRESS NOTE ADULT - ASSESSMENT
63 y/o M w/HCV, emphysema, and chronic pancreatitis admitted for altered mental status. Intubated for acute respiratory failure. Hypotension likely secondary to severe sepsis with septic shock likely secondary to psuedomonal PNA, morganella bacteremia and kelbsiella UTI. CT chest with new cavitary lung lesion (not present on scan 8/30/22) favor cavitary PNA over malignancy due to rapid development. YUNIEL on CKD likely ATN. NSTEMI. Acute liver injury, likely shock liver.  Hyperkalemia.     - Sedation as needed goal RASS -1 to 0  - Daily SAT/SBT  - Titrate pressors as needed goal MAP >= 65  - Appreciate cardiology consult  - HD as per renal  - Continue abx, follow up sensitivities  - Trend LFT, avoid hepatotoxins  - Full code    I have personally provided 35 minutes of attending critical care time excluding procedures.

## 2022-11-29 NOTE — PROGRESS NOTE ADULT - SUBJECTIVE AND OBJECTIVE BOX
HPI:  HPI: 62 yr old M with hx (per chart) of chronic pancreatitis s/p pusestow procedure, CKD4, hep C s/p SVR, esophageal stricture w/ stent in April was found unresponsive in basement. Per EMS, brother said he was "acting altered and hasn't spoken to him in over 3 days." In ER patient unresponsive with severe metabolic abnormalities and intubated for airway protection. Labs notable for severe metabolic acidosis, hyperkalemia, elevated ammonia, & elevated troponins.         (25 Nov 2022 05:31)      24 hr events: No acute events.     ## ROS:  [x ] unable to obtain    ## Vitals  ICU Vital Signs Last 24 Hrs  T(C): 37.7 (29 Nov 2022 08:00), Max: 37.7 (29 Nov 2022 08:00)  T(F): 99.9 (29 Nov 2022 08:00), Max: 99.9 (29 Nov 2022 08:00)  HR: 67 (29 Nov 2022 08:31) (54 - 114)  BP: --  BP(mean): --  ABP: 147/68 (29 Nov 2022 07:30) (52/32 - 189/110)  ABP(mean): 99 (29 Nov 2022 07:30) (40 - 140)  RR: 25 (29 Nov 2022 07:30) (7 - 26)  SpO2: 96% (29 Nov 2022 08:31) (89% - 100%)    O2 Parameters below as of 29 Nov 2022 00:00  Patient On (Oxygen Delivery Method): ventilator    O2 Concentration (%): 30        ## Physical Exam:  Gen: Elderly cachectic male lying in bed NAD  HEENT: NC/AT sclerae anicteric, ET Tube in place  Resp: Mechanical breath sounds b/l, overbreathing vent  CV: S1, S2  Abd: Soft, + BS  Ext: WWP  Neuro: Opens eyes, does not follow commands    ## Vent Data  Mode: AC/ CMV (Assist Control/ Continuous Mandatory Ventilation)  RR (machine): 16  TV (machine): 430  FiO2: 30  PEEP: 5  ITime: 1  MAP: 10  PIP: 16      ## Labs:  Chem:  11-29    143  |  109<H>  |  74<H>  ----------------------------<  102<H>  3.2<L>   |  24  |  3.73<H>    Ca    7.2<L>      29 Nov 2022 02:30  Phos  5.3     11-29  Mg     2.1     11-29    TPro  4.3<L>  /  Alb  1.4<L>  /  TBili  0.2  /  DBili  x   /  AST  373<H>  /  ALT  640<H>  /  AlkPhos  967<H>  11-29    LIVER FUNCTIONS - ( 29 Nov 2022 02:30 )  Alb: 1.4 g/dL / Pro: 4.3 gm/dL / ALK PHOS: 967 U/L / ALT: 640 U/L / AST: 373 U/L / GGT: x           CBC:                        8.4    23.42 )-----------( 38       ( 29 Nov 2022 02:30 )             24.4     Coags:          ## Cardiac        ## Blood Gas      #I/Os  I&O's Detail    28 Nov 2022 07:01  -  29 Nov 2022 07:00  --------------------------------------------------------  IN:    Dexmedetomidine: 78.3 mL    IV PiggyBack: 50 mL    Nepro with Carb Steady: 480 mL    Norepinephrine: 242.9 mL    Propofol: 7.5 mL  Total IN: 858.7 mL    OUT:    Rectal Tube (mL): 1100 mL    Ureteral Catheter (mL): 325 mL  Total OUT: 1425 mL    Total NET: -566.3 mL          ## Imaging:    ## Medications:  MEDICATIONS  (STANDING):  chlorhexidine 0.12% Liquid 15 milliLiter(s) Oral Mucosa every 12 hours  chlorhexidine 2% Cloths 1 Application(s) Topical <User Schedule>  dexMEDEtomidine Infusion 0.5 MICROgram(s)/kG/Hr (5.29 mL/Hr) IV Continuous <Continuous>  lactulose Syrup 20 Gram(s) Oral every 6 hours  midodrine 10 milliGRAM(s) Oral every 8 hours  norepinephrine Infusion 0.05 MICROgram(s)/kG/Min (3.4 mL/Hr) IV Continuous <Continuous>  pantoprazole  Injectable 40 milliGRAM(s) IV Push daily  piperacillin/tazobactam IVPB.- 3.375 Gram(s) IV Intermittent once  propofol Infusion 10 MICROgram(s)/kG/Min (2.54 mL/Hr) IV Continuous <Continuous>  rifAXIMin 550 milliGRAM(s) Oral two times a day    MEDICATIONS  (PRN):  sodium chloride 0.9% lock flush 10 milliLiter(s) IV Push every 1 hour PRN Pre/post blood products, medications, blood draw, and to maintain line patency

## 2022-11-30 NOTE — PROGRESS NOTE ADULT - ASSESSMENT
63 y/o M w/HCV, emphysema, and chronic pancreatitis admitted for altered mental status. Intubated for acute respiratory failure. Hypotension likely secondary to severe sepsis with septic shock likely secondary to psuedomonal PNA, morganella bacteremia and kelbsiella UTI. CT chest with new cavitary lung lesion (not present on scan 8/30/22) favor cavitary PNA over malignancy due to rapid development. YUNIEL on CKD likely ATN. NSTEMI. Acute liver injury, likely shock liver.  Hyperkalemia.     - Sedation as needed goal RASS -1 to 0  - Daily SAT/SBT  - Continue midodrine  - Appreciate cardiology consult  - HD as per renal  - Continue abx, follow up sensitivities  - Trend LFT, avoid hepatotoxins  - DVT prophylaxis  - Full code    I have personally provided 35 minutes of attending critical care time excluding procedures.

## 2022-11-30 NOTE — PROGRESS NOTE ADULT - SUBJECTIVE AND OBJECTIVE BOX
Cardiology Progress Note    Interval Events:  Seen earlier today  Still intubated, on PS trial      MEDICATIONS:  buMETAnide Infusion 2 mG/Hr IV Continuous <Continuous>  midodrine 10 milliGRAM(s) Oral every 8 hours  norepinephrine Infusion 0.05 MICROgram(s)/kG/Min IV Continuous <Continuous>  piperacillin/tazobactam IVPB.. 3.375 Gram(s) IV Intermittent every 12 hours  rifAXIMin 550 milliGRAM(s) Oral two times a day  dexMEDEtomidine Infusion 0.5 MICROgram(s)/kG/Hr IV Continuous <Continuous>  lactulose Syrup 20 Gram(s) Oral every 6 hours  pantoprazole  Injectable 40 milliGRAM(s) IV Push daily  chlorhexidine 0.12% Liquid 15 milliLiter(s) Oral Mucosa every 12 hours  chlorhexidine 2% Cloths 1 Application(s) Topical <User Schedule>  sodium chloride 0.9% lock flush 10 milliLiter(s) IV Push every 1 hour PRN    PHYSICAL EXAM:  T(C): 37.7 (11-30-22 @ 11:23), Max: 37.7 (11-30-22 @ 11:23)  HR: 76 (11-30-22 @ 16:26) (56 - 112)  BP: 85/55 (11-30-22 @ 16:15) (85/55 - 112/68)  RR: 13 (11-30-22 @ 16:24) (12 - 42)  SpO2: 99% (11-30-22 @ 16:26) (91% - 100%)  Wt(kg): --  I&O's Summary    29 Nov 2022 07:01  -  30 Nov 2022 07:00  --------------------------------------------------------  IN: 1243.6 mL / OUT: 1700 mL / NET: -456.4 mL    30 Nov 2022 07:01  -  30 Nov 2022 16:39  --------------------------------------------------------  IN: 686.3 mL / OUT: 435 mL / NET: 251.3 mL    Appearance: No acute distress  HEENT:   ETT present  Cardiovascular: Normal S1 S2, no elevated JVP, no murmurs  Respiratory: Lungs clear to auscultation	, good air movement  Psychiatry: calm  Gastrointestinal:  soft nt	  Skin: No rashes, no ecchymoses, no cyanosis	  Neurologic: arousable    LABS:	 	  CBC Full  -  ( 30 Nov 2022 03:00 )  WBC Count : 14.13 K/uL  Hemoglobin : 8.3 g/dL  Hematocrit : 24.1 %  Platelet Count - Automated : 33 K/uL    11-30  145  |  112<H>  |  86<H>  ----------------------------<  140<H>  3.3<L>   |  25  |  4.58<H>    11-29  143  |  109<H>  |  74<H>  ----------------------------<  102<H>  3.2<L>   |  24  |  3.73<H>    Ca    7.4<L>      30 Nov 2022 03:00  Ca    7.2<L>      29 Nov 2022 02:30  Phos  5.2     11-30  Phos  5.3     11-29  Mg     2.2     11-30  Mg     2.1     11-29    TPro  4.4<L>  /  Alb  1.4<L>  /  TBili  0.2  /  DBili  x   /  AST  158<H>  /  ALT  433<H>  /  AlkPhos  841<H>  11-30  TPro  4.3<L>  /  Alb  1.4<L>  /  TBili  0.2  /  DBili  x   /  AST  373<H>  /  ALT  640<H>  /  AlkPhos  967<H>  11-29      proBNP:   Lipid Profile:   HgA1c:   TSH:     CARDIAC MARKERS:  Troponin I, High Sensitivity Result: 3402.4 ng/L (11-30-22 @ 03:00)  Troponin I, High Sensitivity Result: 8570.4 ng/L (11-28-22 @ 12:20)  Troponin I, High Sensitivity Result: 8882.8 ng/L (11-28-22 @ 04:00)  Troponin I, High Sensitivity Result: 8534.2 ng/L (11-27-22 @ 19:10)    TELEMETRY: 	  SR  ECG:  	  RADIOLOGY:  OTHER: 	    PREVIOUS DIAGNOSTIC TESTING:    [ ] Echocardiogram:   [ ] Catheterization:  [ ] Stress Test:

## 2022-11-30 NOTE — PROGRESS NOTE ADULT - SUBJECTIVE AND OBJECTIVE BOX
[facility]  Division of Infectious Diseases  [phone]    Name: IMELDA ROBERTSON  Age: 62y  Gender: Male  MRN: [id number]    Interval History--  Notes reviewed. Seen earlier today. Remains vent dependent. Hypothermic, on warming blanket. Sedated. Off IV pressors, on midodrine.       Past Medical History--  ETOH abuse    Pancreatitis    Hepatitis C    Gout    HTN (hypertension)    Chronic kidney disease (CKD)    H/O chronic pancreatitis    Gout    Alcohol abuse    No significant past surgical history    Gastric perforation    Gastric perforation        For details regarding the patient's social history, family history, and other miscellaneous elements, please refer the initial infectious diseases consultation and/or the admitting history and physical examination for this admission.    Allergies    Tylenol (Unknown)    Intolerances        Medications--  Antibiotics:  piperacillin/tazobactam IVPB.. 3.375 Gram(s) IV Intermittent every 12 hours  rifAXIMin 550 milliGRAM(s) Oral two times a day    Immunologic:    Other:  buMETAnide Infusion  chlorhexidine 0.12% Liquid  chlorhexidine 2% Cloths  dexMEDEtomidine Infusion  lactulose Syrup  midodrine  pantoprazole  Injectable  sodium chloride 0.9% lock flush PRN      Review of Systems--  Review of systems unable secondary to clinical condition.     Physical Examination--  Vital Signs: T(F): 95.7 (11-30-22 @ 07:19), Max: 98.1 (11-29-22 @ 12:00)  HR: 91 (11-30-22 @ 10:15)  BP: --  RR: 17 (11-30-22 @ 10:15)  SpO2: 95% (11-30-22 @ 10:15)  Wt(kg): --  General: CHronically ill-appearing Male in no acute distress.  HEENT: Bitemporal wasting. Anicteric. Conjunctiva pink and moist. OETT.  Neck: Not rigid. No sense of mass. CVL C/D/I.  Nodes: None palpable.  Lungs: Diminished BS B  Heart: Regular rate and rhythm.   Abdomen: Bowel sounds present and normoactive. Soft. Nondistended. Nontender. No sense of mass. No organomegaly.  Back: Unable.   Extremities: No cyanosis or clubbing. 1-2+ edema. R femoral HD cath C/D/I  Skin: Warm. Dry. Good turgor. No rash. No vasculitic stigmata.  Psychiatric: Unable        Laboratory Studies--  CBC                        8.3    14.13 )-----------( 33       ( 30 Nov 2022 03:00 )             24.1     WBC trend  WBC Count: 14.13 K/uL (11-30-22 @ 03:00)  WBC Count: 23.42 K/uL (11-29-22 @ 02:30)  WBC Count: 22.70 K/uL (11-28-22 @ 04:00)  WBC Count: 25.90 K/uL (11-27-22 @ 08:40)  WBC Count: 22.78 K/uL (11-27-22 @ 02:24)  WBC Count: 16.51 K/uL (11-26-22 @ 04:20)  WBC Count: 8.60 K/uL (11-25-22 @ 20:15)  WBC Count: 7.85 K/uL (11-25-22 @ 01:46)      Chemistries  11-30    145  |  112<H>  |  86<H>  ----------------------------<  140<H>  3.3<L>   |  25  |  4.58<H>    Creatinine Trend  4.58 mg/dL (11-30-22 @ 03:00)  3.73 mg/dL (11-29-22 @ 02:30)  4.50 mg/dL (11-28-22 @ 12:20)  4.50 mg/dL (11-28-22 @ 04:00)  4.07 mg/dL (11-27-22 @ 19:10)  5.40 mg/dL (11-27-22 @ 08:40)  5.44 mg/dL (11-27-22 @ 02:24)  7.80 mg/dL (11-26-22 @ 04:20)  8.00 mg/dL (11-25-22 @ 20:15)  8.08 mg/dL (11-25-22 @ 11:56)  8.94 mg/dL (11-25-22 @ 01:46)    Ca    7.4<L>      30 Nov 2022 03:00  Phos  5.2     11-30  Mg     2.2     11-30    LFT Trend  Total Bilirubin  0.2 mg/dL (11-30-22 @ 03:00)  0.2 mg/dL (11-29-22 @ 02:30)  0.4 mg/dL (11-28-22 @ 04:00)  0.4 mg/dL (11-27-22 @ 08:40)  0.3 mg/dL (11-27-22 @ 02:24)  0.5 mg/dL (11-26-22 @ 04:20)  0.6 mg/dL (11-25-22 @ 20:15)  0.5 mg/dL (11-25-22 @ 01:46)    Direct Bilirubin    Indirect Bilirubin    AKP  841 U/L (11-30-22 @ 03:00)  967 U/L (11-29-22 @ 02:30)  1226 U/L (11-28-22 @ 04:00)  777 U/L (11-27-22 @ 08:40)  846 U/L (11-27-22 @ 02:24)  909 U/L (11-26-22 @ 04:20)  1059 U/L (11-25-22 @ 20:15)  1279 U/L (11-25-22 @ 01:46)    AST  158 U/L (11-30-22 @ 03:00)  373 U/L (11-29-22 @ 02:30)  923 U/L (11-28-22 @ 04:00)  872 U/L (11-27-22 @ 08:40)  1102 U/L (11-27-22 @ 02:24)  2697 U/L (11-26-22 @ 04:20)  3772 U/L (11-25-22 @ 20:15)  2956 U/L (11-25-22 @ 01:46)    ALT  433 U/L (11-30-22 @ 03:00)  640 U/L (11-29-22 @ 02:30)  917 U/L (11-28-22 @ 04:00)  912 U/L (11-27-22 @ 08:40)  1008 U/L (11-27-22 @ 02:24)  1421 U/L (11-26-22 @ 04:20)  1574 U/L (11-25-22 @ 20:15)  1124 U/L (11-25-22 @ 01:46)        Culture Data    Culture - Acid Fast - Sputum w/Smear (collected 27 Nov 2022 15:00)  Source: ET Tube ET Tube    Culture - Sputum (collected 26 Nov 2022 12:20)  Source: ET Tube ET Tube  Gram Stain (28 Nov 2022 15:48):    Moderate polymorphonuclear leukocytes per low power field    No Squamous epithelial cells per low power field    Rare Gram Positive Cocci in Clusters per oil power field  Final Report (28 Nov 2022 15:48):    Few Pseudomonas aeruginosa    Normal Respiratory Mariah present  Organism: Pseudomonas aeruginosa (28 Nov 2022 15:48)  Organism: Pseudomonas aeruginosa (28 Nov 2022 15:48)    Culture - Sputum (collected 25 Nov 2022 11:56)  Source: ET Tube ET Tube  Gram Stain (27 Nov 2022 17:47):    Few polymorphonuclear leukocytes per low power field    No Squamous epithelial cells per low power field    Rare Gram Negative Rods per oil power field    Rare Gram Positive Cocci in Pairs and Chains per oil power field  Final Report (27 Nov 2022 17:47):    Normal Respiratory Mariah present    Culture - Urine (collected 25 Nov 2022 09:00)  Source: Clean Catch Clean Catch (Midstream)  Final Report (27 Nov 2022 15:22):    >100,000 CFU/ml Klebsiella pneumoniae  Organism: Klebsiella pneumoniae (27 Nov 2022 15:22)  Organism: Klebsiella pneumoniae (27 Nov 2022 15:22)    Culture - Blood (collected 25 Nov 2022 01:46)  Source: .Blood Blood-Peripheral  Gram Stain (27 Nov 2022 17:20):    Growth in aerobic bottle: Gram Negative Rods  Final Report (27 Nov 2022 17:20):    Growth in aerobic bottle: Morganella morganii    ***Blood Panel PCR results on this specimen are available    approximately 3 hours after the Gram stain result.***    Gram stain, PCR, and/or culture results may not always    correspond due to difference inmethodologies.    ************************************************************    This PCR assay was performed by multiplex PCR. This    Assay tests for 66 bacterial and resistance gene targets.    Please refer to the [facility] test directory    [email or web address] for details.  Organism: Blood Culture PCR  Morganella morganii (27 Nov 2022 17:20)  Organism: Morganella morganii (27 Nov 2022 17:20)  Organism: Blood Culture PCR (27 Nov 2022 17:20)    Culture - Blood (collected 25 Nov 2022 01:46)  Source: .Blood Blood-Peripheral  Gram Stain (27 Nov 2022 18:21):    Growth in anaerobic bottle: Gram Negative Rods  Final Report (27 Nov 2022 18:21):    Growth in anaerobic bottle: Morganella morganii    See previous culture [id number]

## 2022-11-30 NOTE — PROGRESS NOTE ADULT - ASSESSMENT
Possible necrotizing pneumonia as source of bacteremia.  Wasted appearance but no clear or convincing source of SSTI  No perforation noted related to stent or otherwise, though CT was without IV or enteral contrast  Sputum and urine discordant with blood isolate  Has groin HD cath, not present on admission  Concern that lung process may be polymicrobial abscess  I don't think we need to invoke TB here    11/29: WBC in about the same range, can't expect much change in WBC from a dose or two, FiO2 only 30%. Hopefully some moderation in YUNIEL. LFT improving as well.  11/29: Hypothermia, not a new phenomenon, but recurrent.  No follow-up culture data as of yet.  The setting of recurrent hypothermia, would repeat blood cultures here.  White blood cell count slight decline, platelet count remains low.  Creatinine unfortunately increased.  Liver function test trending down.  Still suspect that bacteremia related to pulmonary process, though FiO2 requirements at this juncture are not great.    Suggestions  Continue Zosyn  Trend CBC  Follow-up creatinine  Again would remove hemodialysis catheter and replace it new site if needed  Again would repeat blood culture data here  Trend liver function tests  Rewarming  Krypton remains guarded    Branden Cavazos MD  Attending Physician  [facility]  Division of Infectious Diseases  [phone]

## 2022-11-30 NOTE — PROGRESS NOTE ADULT - SUBJECTIVE AND OBJECTIVE BOX
[facility] NEPHR[facility], [facility]  NEPHROLOGY AND HYPERTENSION  [address]  [address]  [place] [address]  [phone]    MD HALIE OLIVIA, MD CANDICE ARMSTRONG, MD DHRUV WHITEHEAD, MD ROGERIO ESPOSITO, MD SHERON JUAREZ, MD JANICE ARREOLA MD          Patient events noted  Intubated   Increasing edema  Bumex drip initiated       MEDICATIONS  (STANDING):  buMETAnide Infusion 2 mG/Hr (10 mL/Hr) IV Continuous <Continuous>  chlorhexidine 2% Cloths 1 Application(s) Topical <User Schedule>  lactulose Syrup 20 Gram(s) Oral every 6 hours  midodrine 10 milliGRAM(s) Oral every 8 hours  norepinephrine Infusion 0.05 MICROgram(s)/kG/Min (3.97 mL/Hr) IV Continuous <Continuous>  pantoprazole  Injectable 40 milliGRAM(s) IV Push daily  piperacillin/tazobactam IVPB.. 3.375 Gram(s) IV Intermittent every 12 hours  rifAXIMin 550 milliGRAM(s) Oral two times a day    MEDICATIONS  (PRN):  sodium chloride 0.9% lock flush 10 milliLiter(s) IV Push every 1 hour PRN Pre/post blood products, medications, blood draw, and to maintain line patency      11-29-22 @ 07:01  -  11-30-22 @ 07:00  --------------------------------------------------------  IN: 1243.6 mL / OUT: 1700 mL / NET: -456.4 mL    11-30-22 @ 07:01  -  11-30-22 @ 18:32  --------------------------------------------------------  IN: 686.3 mL / OUT: 2735 mL / NET: -2048.7 mL      PHYSICAL EXAM:      T(C): 36.7 (11-30-22 @ 17:15), Max: 37.7 (11-30-22 @ 11:23)  HR: 102 (11-30-22 @ 18:00) (56 - 112)  BP: 88/66 (11-30-22 @ 18:00) (85/55 - 145/118)  RR: 25 (11-30-22 @ 18:00) (12 - 42)  SpO2: 100% (11-30-22 @ 18:00) (91% - 100%)  Wt(kg): --  Lungs clear  Heart S1S2  Abd soft NT ND  Extremities:   2 edema                                    8.3    14.13 )-----------( 33       ( 30 Nov 2022 03:00 )             24.1     11-30    145  |  112<H>  |  86<H>  ----------------------------<  140<H>  3.3<L>   |  25  |  4.58<H>    Ca    7.4<L>      30 Nov 2022 03:00  Phos  5.2     11-30  Mg     2.2     11-30    TPro  4.4<L>  /  Alb  1.4<L>  /  TBili  0.2  /  DBili  x   /  AST  158<H>  /  ALT  433<H>  /  AlkPhos  841<H>  11-30      LIVER FUNCTIONS - ( 30 Nov 2022 03:00 )  Alb: 1.4 g/dL / Pro: 4.4 gm/dL / ALK PHOS: 841 U/L / ALT: 433 U/L / AST: 158 U/L / GGT: x           Creatinine Trend: 4.58<--, 3.73<--, 4.50<--, 4.50<--, 4.07<--, 5.40<--  Mode: AC/ CMV (Assist Control/ Continuous Mandatory Ventilation)  RR (machine): 16  TV (machine): 430  FiO2: 30  PEEP: 5  ITime: 0.8  MAP: 6  PIP: 10      Assessment   YUNIEL CKD 4; septic shock; ischemic ATN  Suspected DIC; doubt TMA related renal injury.  Increasing UO, however fluid overloaded;       Plan  Hd for today  UF as tolerated   Follow UO trend  Decide on HD tomorrow, d/c stacia Dukes MD

## 2022-11-30 NOTE — PROGRESS NOTE ADULT - SUBJECTIVE AND OBJECTIVE BOX
HPI:  HPI: 62 yr old M with hx (per chart) of chronic pancreatitis s/p pusestow procedure, CKD4, hep C s/p SVR, esophageal stricture w/ stent in April was found unresponsive in basement. Per EMS, brother said he was "acting altered and hasn't spoken to him in over 3 days." In ER patient unresponsive with severe metabolic abnormalities and intubated for airway protection. Labs notable for severe metabolic acidosis, hyperkalemia, elevated ammonia, & elevated troponins.         (25 Nov 2022 05:31)      24 hr events: No acute events. Weaned off pressors. Failed SBT.    ## ROS:  [x ] unable to obtain    ## Vitals  ICU Vital Signs Last 24 Hrs  T(C): 35.4 (30 Nov 2022 07:19), Max: 36.7 (29 Nov 2022 12:00)  T(F): 95.7 (30 Nov 2022 07:19), Max: 98.1 (29 Nov 2022 12:00)  HR: 83 (30 Nov 2022 08:00) (56 - 95)  BP: --  BP(mean): --  ABP: 110/70 (30 Nov 2022 08:00) (78/48 - 212/88)  ABP(mean): 84 (30 Nov 2022 08:00) (60 - 134)  RR: 19 (30 Nov 2022 08:00) (12 - 42)  SpO2: 91% (30 Nov 2022 08:00) (88% - 100%)    O2 Parameters below as of 30 Nov 2022 07:30  Patient On (Oxygen Delivery Method): ventilator,TV-430, AC-16, PEEP-5    O2 Concentration (%): 30        ## Physical Exam:  Gen: Elderly cachectic male lying in bed NAD  HEENT: NC/AT sclerae anicteric, ET Tube in place  Resp: Mechanical breath sounds b/l, overbreathing vent  CV: S1, S2  Abd: Soft, + BS  Ext: WWP  Neuro: Opens eyes, does not follow commands      ## Vent Data  Mode: AC/ CMV (Assist Control/ Continuous Mandatory Ventilation)  RR (machine): 16  TV (machine): 440  FiO2: 30  PEEP: 5  ITime: 1  MAP: 7  PIP: 21      ## Labs:  Chem:  11-30    145  |  112<H>  |  86<H>  ----------------------------<  140<H>  3.3<L>   |  25  |  4.58<H>    Ca    7.4<L>      30 Nov 2022 03:00  Phos  5.2     11-30  Mg     2.2     11-30    TPro  4.4<L>  /  Alb  1.4<L>  /  TBili  0.2  /  DBili  x   /  AST  158<H>  /  ALT  433<H>  /  AlkPhos  841<H>  11-30    LIVER FUNCTIONS - ( 30 Nov 2022 03:00 )  Alb: 1.4 g/dL / Pro: 4.4 gm/dL / ALK PHOS: 841 U/L / ALT: 433 U/L / AST: 158 U/L / GGT: x           CBC:                        8.3    14.13 )-----------( 33       ( 30 Nov 2022 03:00 )             24.1     Coags:  PT/INR - ( 30 Nov 2022 03:00 )   PT: 11.8 sec;   INR: 0.98 ratio         PTT - ( 30 Nov 2022 03:00 )  PTT:28.7 sec        ## Cardiac        ## Blood Gas      #I/Os  I&O's Detail    29 Nov 2022 07:01  -  30 Nov 2022 07:00  --------------------------------------------------------  IN:    Dexmedetomidine: 111.5 mL    IV PiggyBack: 100 mL    Nepro with Carb Steady: 880 mL    Norepinephrine: 152.1 mL  Total IN: 1243.6 mL    OUT:    Rectal Tube (mL): 1000 mL    Ureteral Catheter (mL): 700 mL  Total OUT: 1700 mL    Total NET: -456.4 mL          ## Imaging:    ## Medications:  MEDICATIONS  (STANDING):  chlorhexidine 0.12% Liquid 15 milliLiter(s) Oral Mucosa every 12 hours  chlorhexidine 2% Cloths 1 Application(s) Topical <User Schedule>  dexMEDEtomidine Infusion 0.5 MICROgram(s)/kG/Hr (5.29 mL/Hr) IV Continuous <Continuous>  lactulose Syrup 20 Gram(s) Oral every 6 hours  midodrine 10 milliGRAM(s) Oral every 8 hours  norepinephrine Infusion 0.05 MICROgram(s)/kG/Min (3.4 mL/Hr) IV Continuous <Continuous>  pantoprazole  Injectable 40 milliGRAM(s) IV Push daily  piperacillin/tazobactam IVPB.. 3.375 Gram(s) IV Intermittent every 12 hours  rifAXIMin 550 milliGRAM(s) Oral two times a day    MEDICATIONS  (PRN):  sodium chloride 0.9% lock flush 10 milliLiter(s) IV Push every 1 hour PRN Pre/post blood products, medications, blood draw, and to maintain line patency

## 2022-12-01 NOTE — PROGRESS NOTE ADULT - SUBJECTIVE AND OBJECTIVE BOX
NEPHROLOGY PROGRESS NOTE    CHIEF COMPLAINT:  YUNIEL    HPI:  Seen on dialysis.  Requires levophed 0.2 for BP support.  Trying to remove 2 liters but BP labile.  Making 100 mL/hr urine.    EXAM:  T(F): 97.9 (12-01-22 @ 09:20)  HR: 79 (12-01-22 @ 09:20)  BP: 137/64 (12-01-22 @ 09:00)  RR: 18 (12-01-22 @ 09:20)  SpO2: 100% (12-01-22 @ 09:20)    Conversant, in no apparent distress  Normal respiratory effort, lungs clear bilaterally  Heart RRR with no murmur, +peripheral edema         LABS                             9.5    12.64 )-----------( 38       ( 01 Dec 2022 02:45 )             28.6          12-01    145  |  111<H>  |  74<H>  ----------------------------<  97  3.3<L>   |  24  |  4.01<H>    Ca    7.6<L>      01 Dec 2022 02:45  Phos  3.7     12-01  Mg     2.1     12-01    TPro  5.4<L>  /  Alb  1.8<L>  /  TBili  0.3  /  DBili  x   /  AST  165<H>  /  ALT  402<H>  /  AlkPhos  837<H>  12-01             Assessment   YUNIEL CKD 4; septic shock; ischemic ATN, non oliguric  Suspected DIC; doubt TMA related renal injury    Plan  Complete HD today x 3 hrs with UF as BP permits, 4K bath  Continue to arthur palomo after HD  Observe for renal recovery

## 2022-12-01 NOTE — PROGRESS NOTE ADULT - SUBJECTIVE AND OBJECTIVE BOX
HPI:  HPI: 62 yr old M with hx (per chart) of chronic pancreatitis s/p pusestow procedure, CKD4, hep C s/p SVR, esophageal stricture w/ stent in April was found unresponsive in basement. Per EMS, brother said he was "acting altered and hasn't spoken to him in over 3 days." In ER patient unresponsive with severe metabolic abnormalities and intubated for airway protection. Labs notable for severe metabolic acidosis, hyperkalemia, elevated ammonia, & elevated troponins.         (25 Nov 2022 05:31)      24 hr events: No acute events.     ## ROS:  [x ] unable to obtain    ## Vitals  ICU Vital Signs Last 24 Hrs  T(C): 37.2 (01 Dec 2022 08:00), Max: 37.7 (30 Nov 2022 11:23)  T(F): 98.9 (01 Dec 2022 08:00), Max: 99.9 (30 Nov 2022 11:23)  HR: 89 (01 Dec 2022 08:00) (76 - 112)  BP: 140/69 (01 Dec 2022 08:00) (85/55 - 171/74)  BP(mean): 92 (01 Dec 2022 08:00) (65 - 128)  ABP: 161/79 (01 Dec 2022 08:00) (71/46 - 193/94)  ABP(mean): 101 (01 Dec 2022 08:00) (55 - 137)  RR: 17 (01 Dec 2022 08:00) (12 - 25)  SpO2: 96% (01 Dec 2022 08:00) (95% - 100%)    O2 Parameters below as of 01 Dec 2022 08:00  Patient On (Oxygen Delivery Method): nasal cannula  O2 Flow (L/min): 2          ## Physical Exam:  Gen:  HEENT:  Resp:  CV:  Abd:  Ext:  Neuro:    ## Vent Data  Mode: AC/ CMV (Assist Control/ Continuous Mandatory Ventilation)  RR (machine): 16  TV (machine): 430  FiO2: 30  PEEP: 5  ITime: 0.8  MAP: 6  PIP: 10      ## Labs:  Chem:  12-01    145  |  111<H>  |  74<H>  ----------------------------<  97  3.3<L>   |  24  |  4.01<H>    Ca    7.6<L>      01 Dec 2022 02:45  Phos  3.7     12-01  Mg     2.1     12-01    TPro  5.4<L>  /  Alb  1.8<L>  /  TBili  0.3  /  DBili  x   /  AST  165<H>  /  ALT  402<H>  /  AlkPhos  837<H>  12-01    LIVER FUNCTIONS - ( 01 Dec 2022 02:45 )  Alb: 1.8 g/dL / Pro: 5.4 gm/dL / ALK PHOS: 837 U/L / ALT: 402 U/L / AST: 165 U/L / GGT: x           CBC:                        9.5    12.64 )-----------( 38       ( 01 Dec 2022 02:45 )             28.6     Coags:  PT/INR - ( 30 Nov 2022 03:00 )   PT: 11.8 sec;   INR: 0.98 ratio         PTT - ( 30 Nov 2022 03:00 )  PTT:28.7 sec        ## Cardiac        ## Blood Gas      #I/Os  I&O's Detail    30 Nov 2022 07:01  -  01 Dec 2022 07:00  --------------------------------------------------------  IN:    Bumetanide: 200 mL    Dexmedetomidine: 14.4 mL    Enteral Tube Flush: 360 mL    Nepro with Carb Steady: 550 mL    Norepinephrine: 23.8 mL  Total IN: 1148.2 mL    OUT:    Nasogastric/Oral tube (mL): 100 mL    Other (mL): 2300 mL    Rectal Tube (mL): 750 mL    Ureteral Catheter (mL): 1795 mL  Total OUT: 4945 mL    Total NET: -3796.8 mL      01 Dec 2022 07:01  -  01 Dec 2022 08:39  --------------------------------------------------------  IN:    Bumetanide: 20 mL  Total IN: 20 mL    OUT:    Ureteral Catheter (mL): 130 mL  Total OUT: 130 mL    Total NET: -110 mL          ## Imaging:    ## Medications:  MEDICATIONS  (STANDING):  buMETAnide Infusion 2 mG/Hr (10 mL/Hr) IV Continuous <Continuous>  chlorhexidine 2% Cloths 1 Application(s) Topical <User Schedule>  lactulose Syrup 20 Gram(s) Oral every 8 hours  midodrine 10 milliGRAM(s) Oral every 8 hours  pantoprazole  Injectable 40 milliGRAM(s) IV Push daily  piperacillin/tazobactam IVPB.. 3.375 Gram(s) IV Intermittent every 12 hours  rifAXIMin 550 milliGRAM(s) Oral two times a day    MEDICATIONS  (PRN):  sodium chloride 0.9% lock flush 10 milliLiter(s) IV Push every 1 hour PRN Pre/post blood products, medications, blood draw, and to maintain line patency       HPI:  HPI: 62 yr old M with hx (per chart) of chronic pancreatitis s/p pusestow procedure, CKD4, hep C s/p SVR, esophageal stricture w/ stent in April was found unresponsive in basement. Per EMS, brother said he was "acting altered and hasn't spoken to him in over 3 days." In ER patient unresponsive with severe metabolic abnormalities and intubated for airway protection. Labs notable for severe metabolic acidosis, hyperkalemia, elevated ammonia, & elevated troponins.         (25 Nov 2022 05:31)      24 hr events: No acute events.     ## ROS:  [x ] unable to obtain    ## Vitals  ICU Vital Signs Last 24 Hrs  T(C): 37.2 (01 Dec 2022 08:00), Max: 37.7 (30 Nov 2022 11:23)  T(F): 98.9 (01 Dec 2022 08:00), Max: 99.9 (30 Nov 2022 11:23)  HR: 89 (01 Dec 2022 08:00) (76 - 112)  BP: 140/69 (01 Dec 2022 08:00) (85/55 - 171/74)  BP(mean): 92 (01 Dec 2022 08:00) (65 - 128)  ABP: 161/79 (01 Dec 2022 08:00) (71/46 - 193/94)  ABP(mean): 101 (01 Dec 2022 08:00) (55 - 137)  RR: 17 (01 Dec 2022 08:00) (12 - 25)  SpO2: 96% (01 Dec 2022 08:00) (95% - 100%)    O2 Parameters below as of 01 Dec 2022 08:00  Patient On (Oxygen Delivery Method): nasal cannula  O2 Flow (L/min): 2          ## Physical Exam:  Gen: Elderly cachectic male lying in bed NAD  HEENT: NC/AT sclerae anicteric, ET Tube in place  Resp: Mechanical breath sounds b/l, overbreathing vent  CV: S1, S2  Abd: Soft, + BS  Ext: WWP  Neuro: Opens eyes, does not follow commands      ## Vent Data  Mode: AC/ CMV (Assist Control/ Continuous Mandatory Ventilation)  RR (machine): 16  TV (machine): 430  FiO2: 30  PEEP: 5  ITime: 0.8  MAP: 6  PIP: 10      ## Labs:  Chem:  12-01    145  |  111<H>  |  74<H>  ----------------------------<  97  3.3<L>   |  24  |  4.01<H>    Ca    7.6<L>      01 Dec 2022 02:45  Phos  3.7     12-01  Mg     2.1     12-01    TPro  5.4<L>  /  Alb  1.8<L>  /  TBili  0.3  /  DBili  x   /  AST  165<H>  /  ALT  402<H>  /  AlkPhos  837<H>  12-01    LIVER FUNCTIONS - ( 01 Dec 2022 02:45 )  Alb: 1.8 g/dL / Pro: 5.4 gm/dL / ALK PHOS: 837 U/L / ALT: 402 U/L / AST: 165 U/L / GGT: x           CBC:                        9.5    12.64 )-----------( 38       ( 01 Dec 2022 02:45 )             28.6     Coags:  PT/INR - ( 30 Nov 2022 03:00 )   PT: 11.8 sec;   INR: 0.98 ratio         PTT - ( 30 Nov 2022 03:00 )  PTT:28.7 sec        ## Cardiac        ## Blood Gas      #I/Os  I&O's Detail    30 Nov 2022 07:01  -  01 Dec 2022 07:00  --------------------------------------------------------  IN:    Bumetanide: 200 mL    Dexmedetomidine: 14.4 mL    Enteral Tube Flush: 360 mL    Nepro with Carb Steady: 550 mL    Norepinephrine: 23.8 mL  Total IN: 1148.2 mL    OUT:    Nasogastric/Oral tube (mL): 100 mL    Other (mL): 2300 mL    Rectal Tube (mL): 750 mL    Ureteral Catheter (mL): 1795 mL  Total OUT: 4945 mL    Total NET: -3796.8 mL      01 Dec 2022 07:01  -  01 Dec 2022 08:39  --------------------------------------------------------  IN:    Bumetanide: 20 mL  Total IN: 20 mL    OUT:    Ureteral Catheter (mL): 130 mL  Total OUT: 130 mL    Total NET: -110 mL          ## Imaging:    ## Medications:  MEDICATIONS  (STANDING):  buMETAnide Infusion 2 mG/Hr (10 mL/Hr) IV Continuous <Continuous>  chlorhexidine 2% Cloths 1 Application(s) Topical <User Schedule>  lactulose Syrup 20 Gram(s) Oral every 8 hours  midodrine 10 milliGRAM(s) Oral every 8 hours  pantoprazole  Injectable 40 milliGRAM(s) IV Push daily  piperacillin/tazobactam IVPB.. 3.375 Gram(s) IV Intermittent every 12 hours  rifAXIMin 550 milliGRAM(s) Oral two times a day    MEDICATIONS  (PRN):  sodium chloride 0.9% lock flush 10 milliLiter(s) IV Push every 1 hour PRN Pre/post blood products, medications, blood draw, and to maintain line patency       HPI:  HPI: 62 yr old M with hx (per chart) of chronic pancreatitis s/p pusestow procedure, CKD4, hep C s/p SVR, esophageal stricture w/ stent in April was found unresponsive in basement. Per EMS, brother said he was "acting altered and hasn't spoken to him in over 3 days." In ER patient unresponsive with severe metabolic abnormalities and intubated for airway protection. Labs notable for severe metabolic acidosis, hyperkalemia, elevated ammonia, & elevated troponins.         (25 Nov 2022 05:31)      24 hr events: Extubated without complication.     ## ROS:  [x ] unable to obtain    ## Vitals  ICU Vital Signs Last 24 Hrs  T(C): 37.2 (01 Dec 2022 08:00), Max: 37.7 (30 Nov 2022 11:23)  T(F): 98.9 (01 Dec 2022 08:00), Max: 99.9 (30 Nov 2022 11:23)  HR: 89 (01 Dec 2022 08:00) (76 - 112)  BP: 140/69 (01 Dec 2022 08:00) (85/55 - 171/74)  BP(mean): 92 (01 Dec 2022 08:00) (65 - 128)  ABP: 161/79 (01 Dec 2022 08:00) (71/46 - 193/94)  ABP(mean): 101 (01 Dec 2022 08:00) (55 - 137)  RR: 17 (01 Dec 2022 08:00) (12 - 25)  SpO2: 96% (01 Dec 2022 08:00) (95% - 100%)    O2 Parameters below as of 01 Dec 2022 08:00  Patient On (Oxygen Delivery Method): nasal cannula  O2 Flow (L/min): 2          ## Physical Exam:  Gen: Elderly cachectic male lying in bed NAD  HEENT: NC/AT sclerae anicteric  Resp: No increased WOB, CTAB  CV: S1, S2  Abd: Soft, + BS  Ext: WWP  Neuro: Awake, alert, follows commands      ## Vent Data  Mode: AC/ CMV (Assist Control/ Continuous Mandatory Ventilation)  RR (machine): 16  TV (machine): 430  FiO2: 30  PEEP: 5  ITime: 0.8  MAP: 6  PIP: 10      ## Labs:  Chem:  12-01    145  |  111<H>  |  74<H>  ----------------------------<  97  3.3<L>   |  24  |  4.01<H>    Ca    7.6<L>      01 Dec 2022 02:45  Phos  3.7     12-01  Mg     2.1     12-01    TPro  5.4<L>  /  Alb  1.8<L>  /  TBili  0.3  /  DBili  x   /  AST  165<H>  /  ALT  402<H>  /  AlkPhos  837<H>  12-01    LIVER FUNCTIONS - ( 01 Dec 2022 02:45 )  Alb: 1.8 g/dL / Pro: 5.4 gm/dL / ALK PHOS: 837 U/L / ALT: 402 U/L / AST: 165 U/L / GGT: x           CBC:                        9.5    12.64 )-----------( 38       ( 01 Dec 2022 02:45 )             28.6     Coags:  PT/INR - ( 30 Nov 2022 03:00 )   PT: 11.8 sec;   INR: 0.98 ratio         PTT - ( 30 Nov 2022 03:00 )  PTT:28.7 sec        ## Cardiac        ## Blood Gas      #I/Os  I&O's Detail    30 Nov 2022 07:01  -  01 Dec 2022 07:00  --------------------------------------------------------  IN:    Bumetanide: 200 mL    Dexmedetomidine: 14.4 mL    Enteral Tube Flush: 360 mL    Nepro with Carb Steady: 550 mL    Norepinephrine: 23.8 mL  Total IN: 1148.2 mL    OUT:    Nasogastric/Oral tube (mL): 100 mL    Other (mL): 2300 mL    Rectal Tube (mL): 750 mL    Ureteral Catheter (mL): 1795 mL  Total OUT: 4945 mL    Total NET: -3796.8 mL      01 Dec 2022 07:01  -  01 Dec 2022 08:39  --------------------------------------------------------  IN:    Bumetanide: 20 mL  Total IN: 20 mL    OUT:    Ureteral Catheter (mL): 130 mL  Total OUT: 130 mL    Total NET: -110 mL          ## Imaging:    ## Medications:  MEDICATIONS  (STANDING):  buMETAnide Infusion 2 mG/Hr (10 mL/Hr) IV Continuous <Continuous>  chlorhexidine 2% Cloths 1 Application(s) Topical <User Schedule>  lactulose Syrup 20 Gram(s) Oral every 8 hours  midodrine 10 milliGRAM(s) Oral every 8 hours  pantoprazole  Injectable 40 milliGRAM(s) IV Push daily  piperacillin/tazobactam IVPB.. 3.375 Gram(s) IV Intermittent every 12 hours  rifAXIMin 550 milliGRAM(s) Oral two times a day    MEDICATIONS  (PRN):  sodium chloride 0.9% lock flush 10 milliLiter(s) IV Push every 1 hour PRN Pre/post blood products, medications, blood draw, and to maintain line patency       HPI:  HPI: 62 yr old M with hx (per chart) of chronic pancreatitis s/p pusestow procedure, CKD4, hep C s/p SVR, esophageal stricture w/ stent in April was found unresponsive in basement. Per EMS, brother said he was "acting altered and hasn't spoken to him in over 3 days." In ER patient unresponsive with severe metabolic abnormalities and intubated for airway protection. Labs notable for severe metabolic acidosis, hyperkalemia, elevated ammonia, & elevated troponins.         (25 Nov 2022 05:31)      24 hr events: Extubated without complication. Doesn't feel great. No chest pain or dyspnea. Thirsty. Reports intermittent abdominal pain.    ## ROS:  [x ] unable to obtain    ## Vitals  ICU Vital Signs Last 24 Hrs  T(C): 37.2 (01 Dec 2022 08:00), Max: 37.7 (30 Nov 2022 11:23)  T(F): 98.9 (01 Dec 2022 08:00), Max: 99.9 (30 Nov 2022 11:23)  HR: 89 (01 Dec 2022 08:00) (76 - 112)  BP: 140/69 (01 Dec 2022 08:00) (85/55 - 171/74)  BP(mean): 92 (01 Dec 2022 08:00) (65 - 128)  ABP: 161/79 (01 Dec 2022 08:00) (71/46 - 193/94)  ABP(mean): 101 (01 Dec 2022 08:00) (55 - 137)  RR: 17 (01 Dec 2022 08:00) (12 - 25)  SpO2: 96% (01 Dec 2022 08:00) (95% - 100%)    O2 Parameters below as of 01 Dec 2022 08:00  Patient On (Oxygen Delivery Method): nasal cannula  O2 Flow (L/min): 2          ## Physical Exam:  Gen: Elderly cachectic male lying in bed NAD  HEENT: NC/AT sclerae anicteric  Resp: No increased WOB, CTAB  CV: S1, S2  Abd: Soft, + BS. Reports pain when being examined, however sometimes yells in pain before even being touched despite lying comfortably in bed previously.  Ext: WWP  Neuro: Awake, alert, follows commands      ## Vent Data  Mode: AC/ CMV (Assist Control/ Continuous Mandatory Ventilation)  RR (machine): 16  TV (machine): 430  FiO2: 30  PEEP: 5  ITime: 0.8  MAP: 6  PIP: 10      ## Labs:  Chem:  12-01    145  |  111<H>  |  74<H>  ----------------------------<  97  3.3<L>   |  24  |  4.01<H>    Ca    7.6<L>      01 Dec 2022 02:45  Phos  3.7     12-01  Mg     2.1     12-01    TPro  5.4<L>  /  Alb  1.8<L>  /  TBili  0.3  /  DBili  x   /  AST  165<H>  /  ALT  402<H>  /  AlkPhos  837<H>  12-01    LIVER FUNCTIONS - ( 01 Dec 2022 02:45 )  Alb: 1.8 g/dL / Pro: 5.4 gm/dL / ALK PHOS: 837 U/L / ALT: 402 U/L / AST: 165 U/L / GGT: x           CBC:                        9.5    12.64 )-----------( 38       ( 01 Dec 2022 02:45 )             28.6     Coags:  PT/INR - ( 30 Nov 2022 03:00 )   PT: 11.8 sec;   INR: 0.98 ratio         PTT - ( 30 Nov 2022 03:00 )  PTT:28.7 sec        ## Cardiac        ## Blood Gas      #I/Os  I&O's Detail    30 Nov 2022 07:01  -  01 Dec 2022 07:00  --------------------------------------------------------  IN:    Bumetanide: 200 mL    Dexmedetomidine: 14.4 mL    Enteral Tube Flush: 360 mL    Nepro with Carb Steady: 550 mL    Norepinephrine: 23.8 mL  Total IN: 1148.2 mL    OUT:    Nasogastric/Oral tube (mL): 100 mL    Other (mL): 2300 mL    Rectal Tube (mL): 750 mL    Ureteral Catheter (mL): 1795 mL  Total OUT: 4945 mL    Total NET: -3796.8 mL      01 Dec 2022 07:01  -  01 Dec 2022 08:39  --------------------------------------------------------  IN:    Bumetanide: 20 mL  Total IN: 20 mL    OUT:    Ureteral Catheter (mL): 130 mL  Total OUT: 130 mL    Total NET: -110 mL          ## Imaging:    ## Medications:  MEDICATIONS  (STANDING):  buMETAnide Infusion 2 mG/Hr (10 mL/Hr) IV Continuous <Continuous>  chlorhexidine 2% Cloths 1 Application(s) Topical <User Schedule>  lactulose Syrup 20 Gram(s) Oral every 8 hours  midodrine 10 milliGRAM(s) Oral every 8 hours  pantoprazole  Injectable 40 milliGRAM(s) IV Push daily  piperacillin/tazobactam IVPB.. 3.375 Gram(s) IV Intermittent every 12 hours  rifAXIMin 550 milliGRAM(s) Oral two times a day    MEDICATIONS  (PRN):  sodium chloride 0.9% lock flush 10 milliLiter(s) IV Push every 1 hour PRN Pre/post blood products, medications, blood draw, and to maintain line patency

## 2022-12-01 NOTE — PROGRESS NOTE ADULT - SUBJECTIVE AND OBJECTIVE BOX
[facility]  Division of Infectious Diseases  [phone]    Name: IMELDA ROBERTSON  Age: 62y  Gender: Male  MRN: [id number]    Interval History--  Notes reviewed. Seen earlier today. Extubated, initially refused to be examined but then changed his mind. C/O abdominal pain. Denies SOB or CP. No N/V.     Past Medical History--  ETOH abuse    Pancreatitis    Hepatitis C    Gout    HTN (hypertension)    Chronic kidney disease (CKD)    H/O chronic pancreatitis    Gout    Alcohol abuse    No significant past surgical history    Gastric perforation    Gastric perforation        For details regarding the patient's social history, family history, and other miscellaneous elements, please refer the initial infectious diseases consultation and/or the admitting history and physical examination for this admission.    Allergies    Tylenol (Unknown)    Intolerances        Medications--  Antibiotics:  piperacillin/tazobactam IVPB.. 3.375 Gram(s) IV Intermittent every 12 hours  rifAXIMin 550 milliGRAM(s) Oral two times a day    Immunologic:    Other:  buMETAnide Infusion  chlorhexidine 2% Cloths  lactulose Syrup  midodrine  pantoprazole  Injectable  sodium chloride 0.9% lock flush PRN      Review of Systems--  A 10-point review of systems was obtained.   Review of systems otherwise negative except as previously noted.    Physical Examination--  Vital Signs: T(F): 97.1 (12-01-22 @ 16:00), Max: 98.9 (12-01-22 @ 08:00)  HR: 88 (12-01-22 @ 15:00)  BP: 103/72 (12-01-22 @ 15:00)  RR: 19 (12-01-22 @ 15:00)  SpO2: 97% (12-01-22 @ 15:00)  Wt(kg): --  General: CHronically ill-appearing Male in no acute distress.  HEENT: Bitemporal wasting. Anicteric. Conjunctiva pink and moist. Isabela grossly clear.  Neck: Not rigid. No sense of mass. CVL C/D/I.  Nodes: None palpable.  Lungs: Diminished BS B  Heart: Regular rate and rhythm.   Abdomen: Bowel sounds present and normoactive. Soft. Nondistended. Nontender. No sense of mass. No organomegaly.  Back: Unable.   Extremities: No cyanosis or clubbing. 1-2+ edema. R femoral HD cath C/D/I  Skin: Warm. Dry. Good turgor. No rash. No vasculitic stigmata.  Psychiatric: Grossly appropriate mood and affect.      Laboratory Studies--  CBC                        9.5    12.64 )-----------( 38       ( 01 Dec 2022 02:45 )             28.6       Chemistries  12-01    145  |  111<H>  |  74<H>  ----------------------------<  97  3.3<L>   |  24  |  4.01<H>    Ca    7.6<L>      01 Dec 2022 02:45  Phos  3.7     12-01  Mg     2.1     12-01    TPro  5.4<L>  /  Alb  1.8<L>  /  TBili  0.3  /  DBili  x   /  AST  165<H>  /  ALT  402<H>  /  AlkPhos  837<H>  12-01      Culture Data    Culture - Acid Fast - Sputum w/Smear (collected 27 Nov 2022 15:00)  Source: ET Tube ET Tube  Preliminary Report (30 Nov 2022 15:04):    Culture is being performed.    Culture - Sputum (collected 26 Nov 2022 12:20)  Source: ET Tube ET Tube  Gram Stain (28 Nov 2022 15:48):    Moderate polymorphonuclear leukocytes per low power field    No Squamous epithelial cells per low power field    Rare Gram Positive Cocci in Clusters per oil power field  Final Report (28 Nov 2022 15:48):    Few Pseudomonas aeruginosa    Normal Respiratory Mariah present  Organism: Pseudomonas aeruginosa (28 Nov 2022 15:48)  Organism: Pseudomonas aeruginosa (28 Nov 2022 15:48)    Culture - Sputum (collected 25 Nov 2022 11:56)  Source: ET Tube ET Tube  Gram Stain (27 Nov 2022 17:47):    Few polymorphonuclear leukocytes per low power field    No Squamous epithelial cells per low power field    Rare Gram Negative Rods per oil power field    Rare Gram Positive Cocci in Pairs and Chains per oil power field  Final Report (27 Nov 2022 17:47):    Normal Respiratory Mariah present    Culture - Urine (collected 25 Nov 2022 09:00)  Source: Clean Catch Clean Catch (Midstream)  Final Report (27 Nov 2022 15:22):    >100,000 CFU/ml Klebsiella pneumoniae  Organism: Klebsiella pneumoniae (27 Nov 2022 15:22)  Organism: Klebsiella pneumoniae (27 Nov 2022 15:22)    Culture - Blood (collected 25 Nov 2022 01:46)  Source: .Blood Blood-Peripheral  Gram Stain (27 Nov 2022 17:20):    Growth in aerobic bottle: Gram Negative Rods  Final Report (27 Nov 2022 17:20):    Growth in aerobic bottle: Morganella morganii    ***Blood Panel PCR results on this specimen are available    approximately 3 hours after the Gram stain result.***    Gram stain, PCR, and/or culture results may not always    correspond due to difference inmethodologies.    ************************************************************    This PCR assay was performed by multiplex PCR. This    Assay tests for 66 bacterial and resistance gene targets.    Please refer to the [facility] test directory    [email or web address] for details.  Organism: Blood Culture PCR  Morganella morganii (27 Nov 2022 17:20)  Organism: Morganella morganii (27 Nov 2022 17:20)  Organism: Blood Culture PCR (27 Nov 2022 17:20)    Culture - Blood (collected 25 Nov 2022 01:46)  Source: .Blood Blood-Peripheral  Gram Stain (27 Nov 2022 18:21):    Growth in anaerobic bottle: Gram Negative Rods  Final Report (27 Nov 2022 18:21):    Growth in anaerobic bottle: Morganella morganii    See previous culture [id number]

## 2022-12-01 NOTE — PROGRESS NOTE ADULT - ASSESSMENT
Possible necrotizing pneumonia as source of bacteremia.  Wasted appearance but no clear or convincing source of SSTI  No perforation noted related to stent or otherwise, though CT was without IV or enteral contrast  Sputum and urine discordant with blood isolate  Has groin HD cath, not present on admission  Concern that lung process may be polymicrobial abscess  I don't think we need to invoke TB here    11/29: WBC in about the same range, can't expect much change in WBC from a dose or two, FiO2 only 30%. Hopefully some moderation in YUNIEL. LFT improving as well.  11/30: Hypothermia, not a new phenomenon, but recurrent.  No follow-up culture data as of yet.  The setting of recurrent hypothermia, would repeat blood cultures here.  White blood cell count slight decline, platelet count remains low.  Creatinine unfortunately increased.  Liver function test trending down.  Still suspect that bacteremia related to pulmonary process, though FiO2 requirements at this juncture are not great.  12/1: Extiubated and not hypothermic, so some positives steps here. Cultures not obtained as suggested yesterday.    Suggestions  Continue Zosyn  Trend CBC  Follow-up creatinine  Again would remove hemodialysis catheter and replace it new site if needed    D/[facility]    Branden Cavazos MD  Attending Physician  [facility]  Division of Infectious Diseases  [phone]

## 2022-12-01 NOTE — PROGRESS NOTE ADULT - ASSESSMENT
63 y/o M w/HCV, emphysema, and chronic pancreatitis admitted for altered mental status. Intubated for acute respiratory failure. Hypotension likely secondary to severe sepsis with septic shock likely secondary to psuedomonal PNA, morganella bacteremia and kelbsiella UTI. CT chest with new cavitary lung lesion (not present on scan 8/30/22) favor cavitary PNA over malignancy due to rapid development. YUNIEL on CKD likely ATN. NSTEMI. Acute liver injury, likely shock liver.  Hyperkalemia.     - Sedation as needed goal RASS -1 to 0  - Daily SAT/SBT  - Continue midodrine  - Appreciate cardiology consult  - HD as per renal  - Continue abx, follow up sensitivities  - Trend LFT, avoid hepatotoxins  - DVT prophylaxis  - Full code    I have personally provided 35 minutes of attending critical care time excluding procedures.   61 y/o M w/HCV, emphysema, and chronic pancreatitis admitted for altered mental status. Intubated for acute respiratory failure. Hypotension likely secondary to severe sepsis with septic shock likely secondary to psuedomonal PNA, morganella bacteremia and kelbsiella UTI. CT chest with new cavitary lung lesion (not present on scan 8/30/22) favor cavitary PNA over malignancy due to rapid development. YUNIEL on CKD likely ATN. NSTEMI. Acute liver injury, likely shock liver.  Hyperkalemia.     - Supplemental O2 as needed  - Continue midodrine  - Continue diuresis  - HD as per renal  - Continue abx, follow up sensitivities  - Trend LFT, avoid hepatotoxins  - DVT prophylaxis  - Full code  - Downgrade to medicine   61 y/o M w/HCV, emphysema, and chronic pancreatitis admitted for altered mental status. Intubated for acute respiratory failure. Hypotension likely secondary to severe sepsis with septic shock likely secondary to psuedomonal PNA, morganella bacteremia and kelbsiella UTI. CT chest with new cavitary lung lesion (not present on scan 8/30/22) favor cavitary PNA over malignancy due to rapid development. YUNIEL on CKD likely ATN. NSTEMI. Acute liver injury, likely shock liver.  Hyperkalemia.     - Supplemental O2 as needed  - Continue midodrine  - Continues to require pressor support during HD. If patient continues to require HD will give extra dose of midodrine pre HD  - Continue diuresis  - HD as per renal  - Continue abx, follow up sensitivities  - Trend LFT, avoid hepatotoxins  - Monitor abdominal pain. Previous CT scan without acute abdominal pathology  - DVT prophylaxis  - Full code  - Continues to require ICU level of care due to hypotension during HD    I have personally provided 35 minutes of attending critical care time excluding procedures.

## 2022-12-01 NOTE — PROGRESS NOTE ADULT - SUBJECTIVE AND OBJECTIVE BOX
Cardiology Progress Note    Interval Events:  extubated  confused      MEDICATIONS:  buMETAnide Infusion 2 mG/Hr IV Continuous <Continuous>  midodrine 10 milliGRAM(s) Oral every 8 hours  piperacillin/tazobactam IVPB.. 3.375 Gram(s) IV Intermittent every 12 hours  rifAXIMin 550 milliGRAM(s) Oral two times a day  lactulose Syrup 20 Gram(s) Oral every 8 hours  pantoprazole  Injectable 40 milliGRAM(s) IV Push daily  chlorhexidine 2% Cloths 1 Application(s) Topical <User Schedule>  sodium chloride 0.9% lock flush 10 milliLiter(s) IV Push every 1 hour PRN    PHYSICAL EXAM:  T(C): 36.4 (12-01-22 @ 12:20), Max: 37.4 (11-30-22 @ 15:00)  HR: 100 (12-01-22 @ 12:20) (76 - 112)  BP: 94/68 (12-01-22 @ 12:20) (85/55 - 171/74)  RR: 18 (12-01-22 @ 12:20) (12 - 25)  SpO2: 100% (12-01-22 @ 12:20) (96% - 100%)  Wt(kg): --  I&O's Summary    30 Nov 2022 07:01  -  01 Dec 2022 07:00  --------------------------------------------------------  IN: 1148.2 mL / OUT: 4945 mL / NET: -3796.8 mL    01 Dec 2022 07:01  -  01 Dec 2022 12:21  --------------------------------------------------------  IN: 40 mL / OUT: 2315 mL / NET: -2275 mL    Appearance: No acute distress  HEENT:   mmm  Cardiovascular: Normal S1 S2, no elevated JVP, no murmurs  Respiratory: Lungs clear to auscultation anteriorly, poor inspiratory effort  Psychiatry: calm  Gastrointestinal:  soft nt	  Skin: No rashes, no ecchymoses, no cyanosis	  Neurologic: confused    LABS:	 	  CBC Full  -  ( 01 Dec 2022 02:45 )  WBC Count : 12.64 K/uL  Hemoglobin : 9.5 g/dL  Hematocrit : 28.6 %  Platelet Count - Automated : 38 K/uL    12-01  145  |  111<H>  |  74<H>  ----------------------------<  97  3.3<L>   |  24  |  4.01<H>    11-30  145  |  112<H>  |  86<H>  ----------------------------<  140<H>  3.3<L>   |  25  |  4.58<H>    Ca    7.6<L>      01 Dec 2022 02:45  Ca    7.4<L>      30 Nov 2022 03:00  Phos  3.7     12-01  Phos  5.2     11-30  Mg     2.1     12-01  Mg     2.2     11-30    TPro  5.4<L>  /  Alb  1.8<L>  /  TBili  0.3  /  DBili  x   /  AST  165<H>  /  ALT  402<H>  /  AlkPhos  837<H>  12-01  TPro  4.4<L>  /  Alb  1.4<L>  /  TBili  0.2  /  DBili  x   /  AST  158<H>  /  ALT  433<H>  /  AlkPhos  841<H>  11-30      proBNP:   Lipid Profile:   HgA1c:   TSH:     CARDIAC MARKERS:    Troponin I, High Sensitivity Result: 3402.4 ng/L (11-30-22 @ 03:00)    TELEMETRY: 	  SR  ECG:  	  RADIOLOGY:  OTHER: 	    PREVIOUS DIAGNOSTIC TESTING:    [ ] Echocardiogram:   [ ] Catheterization:  [ ] Stress Test:

## 2022-12-02 NOTE — OCCUPATIONAL THERAPY INITIAL EVALUATION ADULT - PLANNED THERAPY INTERVENTIONS, OT EVAL
energy conservation techniques/ADL retraining/balance training/bed mobility training/cognitive, visual perceptual/joint mobilization/massage/parent/caregiver training.../ROM/strengthening/stretching/transfer training

## 2022-12-02 NOTE — PROGRESS NOTE ADULT - ASSESSMENT
62M CKD, hx of HCV, chronic pancreatitis, esophageal stricture s/p stent who was found unresponsive in the setting of AMS for several days prior, found to have severe metabolic abnormalities and intubated for airway protection.    Significant troponin elevation though grossly preserved LV function; suspect may be due to prolonged hypotension/stress response and decreased renal function. Troponin trended down.    would hold off on ACS-specific treatment joel given thrombocytopenia and anemia, though would consider starting asa 81mg daily when platelet count recovers. further risk stratification pending clinical course. as he remains significantly ill, will defer for now.    will sign off, please call back when pt is clinically stable for ischemic eval.

## 2022-12-02 NOTE — PROGRESS NOTE ADULT - ASSESSMENT
Possible necrotizing pneumonia as source of bacteremia.  Wasted appearance but no clear or convincing source of SSTI  No perforation noted related to stent or otherwise, though CT was without IV or enteral contrast  Sputum and urine discordant with blood isolate  Has groin HD cath, not present on admission  Concern that lung process may be polymicrobial abscess  I don't think we need to invoke TB here    11/29: WBC in about the same range, can't expect much change in WBC from a dose or two, FiO2 only 30%. Hopefully some moderation in YUNIEL. LFT improving as well.  11/30: Hypothermia, not a new phenomenon, but recurrent.  No follow-up culture data as of yet.  The setting of recurrent hypothermia, would repeat blood cultures here.  White blood cell count slight decline, platelet count remains low.  Creatinine unfortunately increased.  Liver function test trending down.  Still suspect that bacteremia related to pulmonary process, though FiO2 requirements at this juncture are not great.  12/1: Extiubated and not hypothermic, so some positives steps here. Cultures not obtained as suggested yesterday.  12/2: Overall stable to improved. WBC declined. Exam essentially unchanged.    Suggestions  Continue Zosyn  Trend CBC  Follow-up creatinine  Timing of repeat imaging TBD.    Dr. Gardiner on call over the weekend. Please reach out to him via Teams for any issues that may arise.    Branden Cavazos MD  Attending Physician  [facility]  Division of Infectious Diseases  [phone]

## 2022-12-02 NOTE — OCCUPATIONAL THERAPY INITIAL EVALUATION ADULT - FINE MOTOR COORDINATION, RIGHT HAND, DIADOCHOKINESIS SKILLS, OT EVAL
Caller would like to discuss an/a Medication. Writer advised caller of callback within 24-72 hours.    Patient Name: Soraya Clinton  Caller Name: Quin  Callback Number: [phone]  Best Availability:  Today if possible patient will not be available after today until 6/22/18  Additional Info: Patient would like to have her prescription changed for her metformin (GLUCOPHAGE-XR) 500 MG 24 hr tablet .  Patient states her dosage and increased and new takes two pills instead of one.   Would like changed prescription sent to pharmacy below      Did you confirm the message with the caller?: yes    Thank you,  Mirza Gruber    [facility] - [address]         unable to perform

## 2022-12-02 NOTE — OCCUPATIONAL THERAPY INITIAL EVALUATION ADULT - RANGE OF MOTION, PT EVAL
Pt will increase ROM in right , shoulder, elbow , wrist and hand  by 30 degrees to safely and to assist with upper body self care tasks within  12 weeks

## 2022-12-02 NOTE — OCCUPATIONAL THERAPY INITIAL EVALUATION ADULT - BALANCE DISTURBANCE, IDENTIFIED IMPAIRMENT CONTRIBUTE, REHAB EVAL
impaired coordination/abnormal muscle tone/pain/impaired postural control/decreased ROM/decreased sensation/impaired sensory feedback/decreased strength

## 2022-12-02 NOTE — OCCUPATIONAL THERAPY INITIAL EVALUATION ADULT - BALANCE TRAINING, PT EVAL
Pt will attain sitting balance 1 grade to be able to sit upright for eating and to interact with his surroundings.

## 2022-12-02 NOTE — PROGRESS NOTE ADULT - SUBJECTIVE AND OBJECTIVE BOX
Cardiology Progress Note    Interval Events:  Remains without anginal sx    MEDICATIONS:  buMETAnide Infusion 2 mG/Hr IV Continuous <Continuous>  midodrine 10 milliGRAM(s) Oral every 8 hours  piperacillin/tazobactam IVPB.. 3.375 Gram(s) IV Intermittent every 12 hours  rifAXIMin 550 milliGRAM(s) Oral two times a day  calcium carbonate    500 mG (Tums) Chewable 1 Tablet(s) Chew every 6 hours PRN  lactulose Syrup 20 Gram(s) Oral every 8 hours  pantoprazole  Injectable 40 milliGRAM(s) IV Push daily  chlorhexidine 2% Cloths 1 Application(s) Topical <User Schedule>  sodium chloride 0.9% lock flush 10 milliLiter(s) IV Push every 1 hour PRN    PHYSICAL EXAM:  T(C): 35.6 (12-02-22 @ 08:00), Max: 36.5 (12-02-22 @ 06:46)  HR: 93 (12-02-22 @ 10:00) (80 - 123)  BP: 122/74 (12-02-22 @ 10:00) (68/55 - 143/75)  RR: 19 (12-02-22 @ 10:00) (12 - 23)  SpO2: 99% (12-02-22 @ 10:00) (97% - 100%)  Wt(kg): --  I&O's Summary    01 Dec 2022 07:01  -  02 Dec 2022 07:00  --------------------------------------------------------  IN: 550 mL / OUT: 4115 mL / NET: -3565 mL    02 Dec 2022 07:01  -  02 Dec 2022 11:08  --------------------------------------------------------  IN: 240 mL / OUT: 220 mL / NET: 20 mL      Appearance: No acute distress  HEENT:   mmm  Cardiovascular: Normal S1 S2, no elevated JVP, no murmurs  Respiratory: Lungs clear to auscultation anteriorly, poor inspiratory effort  Psychiatry: calm  Gastrointestinal:  soft nt	  Skin: No rashes, no ecchymoses, no cyanosis	  Neurologic: follows commands      LABS:	 	  CBC Full  -  ( 02 Dec 2022 01:55 )  WBC Count : 8.15 K/uL  Hemoglobin : 9.4 g/dL  Hematocrit : 29.0 %  Platelet Count - Automated : 48 K/uL    12-02  144  |  110<H>  |  64<H>  ----------------------------<  157<H>  3.6   |  24  |  4.34<H>    12-01  145  |  111<H>  |  74<H>  ----------------------------<  97  3.3<L>   |  24  |  4.01<H>    Ca    7.6<L>      02 Dec 2022 01:55  Ca    7.6<L>      01 Dec 2022 02:45  Phos  4.1     12-02  Phos  3.7     12-01  Mg     2.0     12-02  Mg     2.1     12-01    TPro  5.6<L>  /  Alb  1.9<L>  /  TBili  0.2  /  DBili  x   /  AST  160<H>  /  ALT  367<H>  /  AlkPhos  796<H>  12-02  TPro  5.4<L>  /  Alb  1.8<L>  /  TBili  0.3  /  DBili  x   /  AST  165<H>  /  ALT  402<H>  /  AlkPhos  837<H>  12-01    CARDIAC MARKERS:  Troponin I, High Sensitivity Result: 3402.4 ng/L (11-30-22 @ 03:00)    TELEMETRY: 	  SR  ECG:  	  RADIOLOGY:  OTHER:

## 2022-12-02 NOTE — OCCUPATIONAL THERAPY INITIAL EVALUATION ADULT - STRENGTHENING, PT EVAL
Pt will increased BUE/BLE muscle strength by 1 full grade to assist with functional mobility and self care tasks within  12 weeks

## 2022-12-02 NOTE — OCCUPATIONAL THERAPY INITIAL EVALUATION ADULT - GENERAL OBSERVATIONS, REHAB EVAL
Pt seen for initial OT consult after collaboration with CHECO Camacho. Pt was encountered in bed on cardiac, BP & O2 monitoring in NAD. Flexy seal & in IV heplocks are in place. Grade 1+ is noted in RUE. Minimal volitional movement are noted in BUE; none in BLE. Pt was AA&Ox3, forgetful, but  cooperative & followed 1 step command. Pt c/o 10/10 pain in abdomen radiating to BLE. Pt presents with generalized weakness which limits pt's activity tolerance ,balance, ADL management functional mobility. Pt seen for initial OT consult after collaboration with CHECO Camacho. Pt was encountered in bed on cardiac, BP & O2 monitoring in NAD. Flexy seal & in IV heplocks are in place. Grade 1+ is noted in RUE. Minimal volitional movements are noted in RUE, active movement in LUE; none in BLE. Pt was AA&Ox3, forgetful, but  cooperative & followed 1 step command. Pt c/o 10/10 pain in abdomen radiating to BLE. Pt presents with generalized weakness which limits pt's activity tolerance ,balance, ADL management functional mobility.

## 2022-12-02 NOTE — PROGRESS NOTE ADULT - SUBJECTIVE AND OBJECTIVE BOX
CC: Patient is a 62y old  Male who presents with a chief complaint of Unresponsiveness/FTT (02 Dec 2022 11:08)      ## HPI:HPI:  HPI: 62 yr old M with hx (per chart) of chronic pancreatitis s/p pusestow procedure, CKD4, hep C s/p SVR, esophageal stricture w/ stent in April was found unresponsive in basement. Per EMS, brother said he was "acting altered and hasn't spoken to him in over 3 days." In ER patient unresponsive with severe metabolic abnormalities and intubated for airway protection. Labs notable for severe metabolic acidosis, hyperkalemia, elevated ammonia, & elevated troponins.         (25 Nov 2022 05:31)      O/N: no acute events, wants to eat. off pressors    ## ROS:  complains of pain all over  ## EXAM  ICU Vital Signs Last 24 Hrs  T(C): 35.6 (02 Dec 2022 08:00), Max: 36.5 (02 Dec 2022 06:46)  T(F): 96.1 (02 Dec 2022 08:00), Max: 97.7 (02 Dec 2022 06:46)  HR: 88 (02 Dec 2022 12:00) (80 - 97)  BP: 113/79 (02 Dec 2022 12:00) (92/62 - 143/75)  BP(mean): 92 (02 Dec 2022 12:00) (73 - 99)  ABP: 139/67 (02 Dec 2022 12:00) (85/50 - 167/76)  ABP(mean): 94 (02 Dec 2022 12:00) (64 - 113)  RR: 17 (02 Dec 2022 12:00) (12 - 23)  SpO2: 98% (02 Dec 2022 12:00) (97% - 100%)    O2 Parameters below as of 02 Dec 2022 12:00  Patient On (Oxygen Delivery Method): room air          CON : NAD  EENT : EOMI, MMM  NECK : Full ROM  RESP : CTAB no increased WOB  CARD : rrr no m/r/g  ABD : S NT ND NABS no rebound  EXT : right ue edema  NEURO : AAOX3   ## Labs:  Lab Results:  CBC  CBC Full  -  ( 02 Dec 2022 01:55 )  WBC Count : 8.15 K/uL  RBC Count : 3.09 M/uL  Hemoglobin : 9.4 g/dL  Hematocrit : 29.0 %  Platelet Count - Automated : 48 K/uL  Mean Cell Volume : 93.9 fl  Mean Cell Hemoglobin : 30.4 pg  Mean Cell Hemoglobin Concentration : 32.4 g/dL  Auto Neutrophil # : 6.97 K/uL  Auto Lymphocyte # : 0.72 K/uL  Auto Monocyte # : 0.32 K/uL  Auto Eosinophil # : 0.00 K/uL  Auto Basophil # : 0.01 K/uL  Auto Neutrophil % : 85.6 %  Auto Lymphocyte % : 8.8 %  Auto Monocyte % : 3.9 %  Auto Eosinophil % : 0.0 %  Auto Basophil % : 0.1 %    .		Differential:	[] Automated		[] Manual  Chemistry                        9.4    8.15  )-----------( 48       ( 02 Dec 2022 01:55 )             29.0     12-02    144  |  110<H>  |  64<H>  ----------------------------<  157<H>  3.6   |  24  |  4.34<H>    Ca    7.6<L>      02 Dec 2022 01:55  Phos  4.1     12-02  Mg     2.0     12-02    TPro  5.6<L>  /  Alb  1.9<L>  /  TBili  0.2  /  DBili  x   /  AST  160<H>  /  ALT  367<H>  /  AlkPhos  796<H>  12-02    LIVER FUNCTIONS - ( 02 Dec 2022 01:55 )  Alb: 1.9 g/dL / Pro: 5.6 gm/dL / ALK PHOS: 796 U/L / ALT: 367 U/L / AST: 160 U/L / GGT: x               MICROBIOLOGY/CULTURES:  Culture Results:   Culture is being performed. (11-27 @ 15:00)  Culture Results:   Few Pseudomonas aeruginosa  Normal Respiratory Mariah present (11-26 @ 12:20)            ## Medications:  MEDICATIONS  (STANDING):  buMETAnide Injectable 2 milliGRAM(s) IV Push every 12 hours  chlorhexidine 2% Cloths 1 Application(s) Topical <User Schedule>  midodrine 10 milliGRAM(s) Oral every 8 hours  pantoprazole  Injectable 40 milliGRAM(s) IV Push daily  piperacillin/tazobactam IVPB.. 3.375 Gram(s) IV Intermittent every 12 hours  rifAXIMin 550 milliGRAM(s) Oral two times a day    ## O/E:I&O's Summary    01 Dec 2022 07:01  -  02 Dec 2022 07:00  --------------------------------------------------------  IN: 550 mL / OUT: 4115 mL / NET: -3565 mL    02 Dec 2022 07:01  -  02 Dec 2022 12:50  --------------------------------------------------------  IN: 290 mL / OUT: 460 mL / NET: -170 mL        POCUS :   DVT PPX  ## Code status:  Goals of care discussion: [] yes [ ] no  [x] full code  [ ] DNR  [ ] DNI  [ ] NICK

## 2022-12-02 NOTE — OCCUPATIONAL THERAPY INITIAL EVALUATION ADULT - RANGE OF MOTION EXAMINATION, UPPER EXTREMITY
Pt has decreased concentric & eccentric control  is noted in RUE; ROM is limited in all joints by greater then 60 %/Left UE Active ROM was WFL (within functional limits)/Left UE Passive ROM was WFL  (within functional limits)/Right UE Passive ROM was WFL  (within functional limits)/Right UE Active Assistive ROM was WFL  (within functional limits)

## 2022-12-02 NOTE — PROGRESS NOTE ADULT - SUBJECTIVE AND OBJECTIVE BOX
[facility]  Division of Infectious Diseases  [phone]    Name: IMELDA ROBERTSON  Age: 62y  Gender: Male  MRN: [id number]    Interval History--  No interval notes. On Bumex drip. Only complaints are that he wants a tub of buttered popcorn and ice cream.      Past Medical History--  ETOH abuse    Pancreatitis    Hepatitis C    Gout    HTN (hypertension)    Chronic kidney disease (CKD)    H/O chronic pancreatitis    Gout    Alcohol abuse    No significant past surgical history    Gastric perforation    Gastric perforation        For details regarding the patient's social history, family history, and other miscellaneous elements, please refer the initial infectious diseases consultation and/or the admitting history and physical examination for this admission.    Allergies    Tylenol (Unknown)    Intolerances        Medications--  Antibiotics:  piperacillin/tazobactam IVPB.. 3.375 Gram(s) IV Intermittent every 12 hours  rifAXIMin 550 milliGRAM(s) Oral two times a day    Immunologic:    Other:  buMETAnide Infusion  calcium carbonate    500 mG (Tums) Chewable PRN  chlorhexidine 2% Cloths  lactulose Syrup  midodrine  pantoprazole  Injectable  sodium chloride 0.9% lock flush PRN      Review of Systems--  A 10-point review of systems was obtained.   Not clear that he is reliable but review of systems otherwise negative except as previously noted.    Physical Examination--  Vital Signs: T(F): 96.1 (12-02-22 @ 08:00), Max: 97.7 (12-02-22 @ 06:46)  HR: 97 (12-02-22 @ 09:00)  BP: 143/75 (12-02-22 @ 07:00)  RR: 21 (12-02-22 @ 09:00)  SpO2: 100% (12-02-22 @ 09:00)  Wt(kg): --  General: Crronically ill-appearing Male in no acute distress.  HEENT: Bitemporal wasting. Anicteric. Conjunctiva pink and moist. Isabela grossly clear.  Neck: Not rigid. No sense of mass. CVL C/D/I.  Nodes: None palpable.  Lungs: Diminished BS B  Heart: Regular rate and rhythm.   Abdomen: Bowel sounds present and normoactive. Soft. Nondistended. Nontender. No sense of mass. No organomegaly.  Back: Unable.   Extremities: No cyanosis or clubbing. 1-2+ edema.   Skin: Warm. Dry. Good turgor. No rash. No vasculitic stigmata.  Psychiatric: Grossly appropriate mood and affect.      Laboratory Studies--  CBC                        9.4    8.15  )-----------( 48       ( 02 Dec 2022 01:55 )             29.0       Chemistries  12-02    144  |  110<H>  |  64<H>  ----------------------------<  157<H>  3.6   |  24  |  4.34<H>    Ca    7.6<L>      02 Dec 2022 01:55  Phos  4.1     12-02  Mg     2.0     12-02    TPro  5.6<L>  /  Alb  1.9<L>  /  TBili  0.2  /  DBili  x   /  AST  160<H>  /  ALT  367<H>  /  AlkPhos  796<H>  12-02      Culture Data    Culture - Acid Fast - Sputum w/Smear (collected 27 Nov 2022 15:00)  Source: ET Tube ET Tube  Preliminary Report (30 Nov 2022 15:04):    Culture is being performed.    Culture - Sputum (collected 26 Nov 2022 12:20)  Source: ET Tube ET Tube  Gram Stain (28 Nov 2022 15:48):    Moderate polymorphonuclear leukocytes per low power field    No Squamous epithelial cells per low power field    Rare Gram Positive Cocci in Clusters per oil power field  Final Report (28 Nov 2022 15:48):    Few Pseudomonas aeruginosa    Normal Respiratory Mariah present  Organism: Pseudomonas aeruginosa (28 Nov 2022 15:48)  Organism: Pseudomonas aeruginosa (28 Nov 2022 15:48)    Culture - Sputum (collected 25 Nov 2022 11:56)  Source: ET Tube ET Tube  Gram Stain (27 Nov 2022 17:47):    Few polymorphonuclear leukocytes per low power field    No Squamous epithelial cells per low power field    Rare Gram Negative Rods per oil power field    Rare Gram Positive Cocci in Pairs and Chains per oil power field  Final Report (27 Nov 2022 17:47):    Normal Respiratory Mariah present

## 2022-12-02 NOTE — OCCUPATIONAL THERAPY INITIAL EVALUATION ADULT - PRECAUTIONS/LIMITATIONS, REHAB EVAL
monitor pt's vital signs with activities because his BP runs low. Pt should be turned & positioned every 2 hours to prevent skin breakdown due to lack of mobility/aspiration precautions/cardiac precautions/fall precautions

## 2022-12-02 NOTE — OCCUPATIONAL THERAPY INITIAL EVALUATION ADULT - BED MOBILITY TRAINING, PT EVAL
Pt will perform bed mobility with min assist , 2-3 verbal cues and  use of bed rail within 12 weeks.

## 2022-12-02 NOTE — OCCUPATIONAL THERAPY INITIAL EVALUATION ADULT - RIGHT SCAPULA ELEVATION AROM, REHAB EVAL
[facility])  Family Medicine Outpatient        SUBJECTIVE:  CC: had concerns including Results (Patient had blood work and ultrasound done- here to discuss results). HPI:  Ross Dhaliwal is a female 33 y.o. presented to the clinic for follow up from her new patient visit. She would like to review her labs. She denies any acute concerns. Review of Systems   Constitutional:  Negative for appetite change, fatigue and fever. Respiratory:  Negative for cough, shortness of breath and wheezing. Cardiovascular:  Negative for chest pain and palpitations. Gastrointestinal:  Negative for abdominal pain, constipation, diarrhea, nausea and vomiting. No outpatient medications have been marked as taking for the 9/12/22 encounter (Office Visit) with Clement Salcido MD.       I have reviewed all pertinent PMHx, PSHx, FamHx, SocialHx, medications, and allergies and updated history as appropriate. OBJECTIVE    VS: /77   Pulse 88   Temp 97.6 °F (36.4 °C) (Temporal)   Resp 18   Ht 5' 7\" (1.702 m)   Wt 266 lb (120.7 kg)   LMP 08/22/2022 (Approximate)   SpO2 99%   BMI 41.66 kg/m²   Physical Exam  Constitutional:       General: She is not in acute distress. Appearance: She is well-developed. She is obese. She is not diaphoretic. HENT:      Head: Normocephalic and atraumatic. Eyes:      Conjunctiva/sclera: Conjunctivae normal.      Pupils: Pupils are equal, round, and reactive to light. Cardiovascular:      Rate and Rhythm: Normal rate and regular rhythm. Pulmonary:      Effort: Pulmonary effort is normal.      Breath sounds: Normal breath sounds. Abdominal:      General: Bowel sounds are normal. There is no distension. Palpations: Abdomen is soft. Tenderness: There is no abdominal tenderness. Hernia: No hernia is present. Musculoskeletal:      Cervical back: Normal range of motion and neck supple. Skin:     General: Skin is warm and dry.    Neurological:      Mental restricted

## 2022-12-02 NOTE — OCCUPATIONAL THERAPY INITIAL EVALUATION ADULT - ADDITIONAL COMMENTS
Prior to admission, Pt claimed he was functioning in his roles, self sufficient & ambulating independently with a rolling walker . Presently pt needs total assistance with  functional mobility and self care tasks due to pain, generalized weakness, impaired balance coordination and  decreased ROM in BUE/ BLE. Dexterity and fine motor skills are diminished in BUE and is more pronounced in RUE. No volition movements are noted in BLE; pt screams for pain and PROM in BLE. Pt is right hand dominant and wears glasses for reading. Prior to admission, pt claimed he was functioning in his roles, self sufficient & ambulating independently with a rolling walker. Presently, pt needs total assistance with functional mobility and self care tasks due to pain, generalized weakness, impaired balance coordination and decreased ROM in BUE/ BLE. Dexterity and fine motor skills are diminished in BUE and is more pronounced in RUE. No volition movements are noted in BLE; pt screams for pain with palpation and PROM in BLE. Pt is right hand dominant and wears glasses for reading.

## 2022-12-02 NOTE — OCCUPATIONAL THERAPY INITIAL EVALUATION ADULT - PERTINENT HX OF CURRENT PROBLEM, REHAB EVAL
Pt is a 62 presented to  ER due to acute onset of neurological deficit Pt was found unresponsive in the basement of his home . Pt is diagnosed with Adult failure to thrive, respiratory distress and required intubation. Pt is currently extubated on O2 via nasal canula . Doppler of RUE on 12/1/22  is negative for DVT. CT of abdomen on 11/25/22  results confirm Bilateral lower lobe posterior ground glass opacities with minimal superimposed patchy opacities in the right lower lobe, nonspecific but   possibly infection. Large colonic stool burden. Mildly dilated loops of air-filled small   bowel throughout the abdomen

## 2022-12-02 NOTE — PROGRESS NOTE ADULT - SUBJECTIVE AND OBJECTIVE BOX
[facility] NEPHROLOGY SERVICES, [facility]  NEPHROLOGY AND HYPERTENSION  [address]  [address]  [place] [address]  [phone]    MD HALIE OLIVIA, MD DHRUV REMY, MD ROGERIO ESPOSITO, MD SHERON JUAREZ, MD JANICE ARREOLA MD          Patient events noted  No distress  awake and alert     MEDICATIONS  (STANDING):  buMETAnide Injectable 2 milliGRAM(s) IV Push every 12 hours  chlorhexidine 2% Cloths 1 Application(s) Topical <User Schedule>  midodrine 10 milliGRAM(s) Oral every 8 hours  pantoprazole  Injectable 40 milliGRAM(s) IV Push daily  piperacillin/tazobactam IVPB.. 3.375 Gram(s) IV Intermittent every 12 hours  rifAXIMin 550 milliGRAM(s) Oral two times a day    MEDICATIONS  (PRN):  calcium carbonate    500 mG (Tums) Chewable 1 Tablet(s) Chew every 6 hours PRN Upset Stomach  morphine  - Injectable 2 milliGRAM(s) IV Push every 6 hours PRN Moderate Pain (4 - 6)  sodium chloride 0.9% lock flush 10 milliLiter(s) IV Push every 1 hour PRN Pre/post blood products, medications, blood draw, and to maintain line patency      12-01-22 @ 07:01  -  12-02-22 @ 07:00  --------------------------------------------------------  IN: 550 mL / OUT: 4115 mL / NET: -3565 mL    12-02-22 @ 07:01  -  12-02-22 @ 15:54  --------------------------------------------------------  IN: 540 mL / OUT: 460 mL / NET: 80 mL      PHYSICAL EXAM:      T(C): 35.6 (12-02-22 @ 08:00), Max: 36.5 (12-02-22 @ 06:46)  HR: 93 (12-02-22 @ 15:00) (80 - 97)  BP: 104/61 (12-02-22 @ 15:00) (89/56 - 143/75)  RR: 16 (12-02-22 @ 15:00) (12 - 23)  SpO2: 99% (12-02-22 @ 15:00) (98% - 100%)  Wt(kg): --  Lungs clear  Heart S1S2  Abd soft NT ND  Extremities:   trace  edema                                    9.4    8.15  )-----------( 48       ( 02 Dec 2022 01:55 )             29.0     12-02    144  |  110<H>  |  64<H>  ----------------------------<  157<H>  3.6   |  24  |  4.34<H>    Ca    7.6<L>      02 Dec 2022 01:55  Phos  4.1     12-02  Mg     2.0     12-02    TPro  5.6<L>  /  Alb  1.9<L>  /  TBili  0.2  /  DBili  x   /  AST  160<H>  /  ALT  367<H>  /  AlkPhos  796<H>  12-02      LIVER FUNCTIONS - ( 02 Dec 2022 01:55 )  Alb: 1.9 g/dL / Pro: 5.6 gm/dL / ALK PHOS: 796 U/L / ALT: 367 U/L / AST: 160 U/L / GGT: x           Creatinine Trend: 4.34<--, 4.01<--, 4.58<--, 3.73<--, 4.50<--, 4.50<--            Assessment   YUNIEL CKD 4; septic shock; ischemic ATN, non oliguric  Increasing UO trend  Appears near euvolemic     Plan  Hold diuretic regimen   Follow respiratory status, UO and Cr trend    Austin Dukes MD

## 2022-12-02 NOTE — OCCUPATIONAL THERAPY INITIAL EVALUATION ADULT - LIVES WITH, PROFILE
mother in a private house with 4 entry steps equipped with bilateral hand rails that can be reached simultaneously. Pt living space is situated in basement; pt has a descend a flight of stair with a single handrail. The bathroom has a stall shower fixed shower head and standard toilet. mother in a private house with 4 entry steps equipped with bilateral hand rails that can be reached simultaneously. Pt's living space is situated in basement; pt has a descend a flight of stair with a single handrail. The bathroom has a stall shower fixed shower head and standard toilet.

## 2022-12-02 NOTE — PROGRESS NOTE ADULT - ASSESSMENT
63 y/o M w/HCV, emphysema, and chronic pancreatitis admitted for altered mental status. Intubated for acute respiratory failure. Hypotension likely secondary to severe sepsis with septic shock likely secondary to psuedomonal PNA, morganella bacteremia and kelbsiella UTI. CT chest with new cavitary lung lesion (not present on scan 8/30/22) favor cavitary PNA over malignancy due to rapid development. YUNIEL on CKD likely ATN. NSTEMI. Acute liver injury, likely shock liver.  Hyperkalemia.     - Supplemental O2 as needed  - Continue midodrine  - off pressors  - diuresis held  - HD as per renal - observe for now  - Continue abx, follow up sensitivities  - Trend LFT, avoid hepatotoxins  - Monitor abdominal pain. Previous CT scan without acute abdominal pathology  - DVT prophylaxis- patient refusing scd, can not due hep 2/2 low plts.  - Full code  - downgrade to floor

## 2022-12-03 NOTE — PROGRESS NOTE ADULT - ASSESSMENT
61 y/o M w/HCV, emphysema, and chronic pancreatitis admitted for altered mental status. Intubated for acute respiratory failure. Hypotension likely secondary to severe sepsis with septic shock likely secondary to psuedomonal PNA, morganella bacteremia and kelbsiella UTI. CT chest with new cavitary lung lesion (not present on scan 8/30/22) favor cavitary PNA over malignancy due to rapid development. YUNIEL on CKD likely ATN. NSTEMI. Acute liver injury, likely shock liver.  Hyperkalemia.     - Supplemental O2 as needed  - Continue midodrine  - off pressors  - diuresis held  - HD as per renal - observe for now  - Continue abx, follow up sensitivities  - Trend LFT, avoid hepatotoxins  - Monitor abdominal pain. Previous CT scan without acute abdominal pathology  - DVT prophylaxis- patient refusing scd, can not due hep 2/2 low plts.  - Full code  - downgrade to floor

## 2022-12-03 NOTE — PHYSICAL THERAPY INITIAL EVALUATION ADULT - ADDITIONAL COMMENTS
Prior to admission, pt claimed he was functioning in his roles, self sufficient & ambulating independently with a rolling walker. Presently, pt needs total assistance with functional mobility and self care tasks due to pain, generalized weakness, impaired balance coordination and decreased ROM in BUE/ BLE. Dexterity and fine motor skills are diminished in BUE and is more pronounced in RUE. No volition movements are noted in BLE; pt screams for pain with palpation and PROM in BLE. Pt is right hand dominant and wears glasses for reading.

## 2022-12-03 NOTE — PROGRESS NOTE ADULT - SUBJECTIVE AND OBJECTIVE BOX
[facility] NEPHROLOGY SERVICES, [facility]  NEPHROLOGY AND HYPERTENSION  [address]  [address]  [place] [address]  [phone]    MD HALIE OLIVIA, MD CANDICE ARMSTRONG, MD DHRUV WHITEHEAD, MD ROGERIO ESPOSITO, MD SHERON JUAREZ, MD JANICE ARREOLA MD          Patient events noted    MEDICATIONS  (STANDING):  chlorhexidine 2% Cloths 1 Application(s) Topical <User Schedule>  pantoprazole  Injectable 40 milliGRAM(s) IV Push daily  piperacillin/tazobactam IVPB.. 3.375 Gram(s) IV Intermittent every 12 hours  rifAXIMin 550 milliGRAM(s) Oral two times a day    MEDICATIONS  (PRN):  calcium carbonate    500 mG (Tums) Chewable 1 Tablet(s) Chew every 6 hours PRN Upset Stomach  midodrine 10 milliGRAM(s) Oral every 8 hours PRN For SBP < 90  morphine  - Injectable 2 milliGRAM(s) IV Push every 6 hours PRN Moderate Pain (4 - 6)  sodium chloride 0.9% lock flush 10 milliLiter(s) IV Push every 1 hour PRN Pre/post blood products, medications, blood draw, and to maintain line patency      12-02-22 @ 07:01  -  12-03-22 @ 07:00  --------------------------------------------------------  IN: 1340 mL / OUT: 1335 mL / NET: 5 mL    12-03-22 @ 07:01  -  12-03-22 @ 23:26  --------------------------------------------------------  IN: 1000 mL / OUT: 250 mL / NET: 750 mL      PHYSICAL EXAM:      T(C): 37.1 (12-03-22 @ 18:54), Max: 37.1 (12-03-22 @ 18:54)  HR: 80 (12-03-22 @ 18:54) (71 - 100)  BP: 131/69 (12-03-22 @ 18:54) (82/49 - 167/72)  RR: 16 (12-03-22 @ 18:54) (10 - 27)  SpO2: 100% (12-03-22 @ 18:54) (100% - 100%)  Wt(kg): --  Lungs clear  Heart S1S2  Abd soft NT ND  Extremities:   tr edema                                    7.7    7.25  )-----------( 61       ( 03 Dec 2022 03:05 )             23.8     12-03    145  |  110<H>  |  67<H>  ----------------------------<  110<H>  3.4<L>   |  26  |  5.21<H>    Ca    7.1<L>      03 Dec 2022 03:05  Phos  4.4     12-03  Mg     2.0     12-03    TPro  5.2<L>  /  Alb  1.7<L>  /  TBili  0.2  /  DBili  x   /  AST  78<H>  /  ALT  252<H>  /  AlkPhos  605<H>  12-03      LIVER FUNCTIONS - ( 03 Dec 2022 03:05 )  Alb: 1.7 g/dL / Pro: 5.2 gm/dL / ALK PHOS: 605 U/L / ALT: 252 U/L / AST: 78 U/L / GGT: x           Creatinine Trend: 5.21<--, 4.34<--, 4.01<--, 4.58<--, 3.73<--, 4.50<--      Assessment   YUNIEL CKD 4; septic shock; ischemic ATN, non oliguric  Increasing UO trend  Appears near euvolemic     Plan  Hold diuretic regimen   IVF challenge   Follow respiratory status, UO and Cr trend        Austin Dukes MD

## 2022-12-03 NOTE — PHYSICAL THERAPY INITIAL EVALUATION ADULT - GENERAL OBSERVATIONS, REHAB EVAL
Pt found semi supine in bed in NAD, +hep lock, +tele, +condom cath, agreeable to PT Eval and RN aware.

## 2022-12-03 NOTE — PROGRESS NOTE ADULT - SUBJECTIVE AND OBJECTIVE BOX
CC: Patient is a 62y old  Male who presents with a chief complaint of Unresponsiveness/FTT (02 Dec 2022 15:54)      ## HPI:HPI:  HPI: 62 yr old M with hx (per chart) of chronic pancreatitis s/p pusestow procedure, CKD4, hep C s/p SVR, esophageal stricture w/ stent in April was found unresponsive in basement. Per EMS, brother said he was "acting altered and hasn't spoken to him in over 3 days." In ER patient unresponsive with severe metabolic abnormalities and intubated for airway protection. Labs notable for severe metabolic acidosis, hyperkalemia, elevated ammonia, & elevated troponins.         (25 Nov 2022 05:31)      O/N: off pressors    ## ROS:  complains of chronic total body pain  ## EXAM  ICU Vital Signs Last 24 Hrs  T(C): 36.7 (03 Dec 2022 08:00), Max: 37.3 (02 Dec 2022 14:00)  T(F): 98 (03 Dec 2022 08:00), Max: 99.1 (02 Dec 2022 14:00)  HR: 76 (03 Dec 2022 11:00) (71 - 100)  BP: 82/49 (03 Dec 2022 10:00) (82/49 - 167/72)  BP(mean): 59 (03 Dec 2022 10:00) (59 - 97)  ABP: 121/66 (02 Dec 2022 13:00) (121/66 - 139/67)  ABP(mean): 88 (02 Dec 2022 13:00) (88 - 94)  RR: 10 (03 Dec 2022 11:00) (10 - 27)  SpO2: 100% (03 Dec 2022 11:00) (98% - 100%)    O2 Parameters below as of 03 Dec 2022 07:16  Patient On (Oxygen Delivery Method): room air          CON : NAD  EENT : EOMI, MMM  NECK : Full ROM  RESP : CTAB no increased WOB  CARD : rrr no m/r/g  ABD : S NT ND NABS no rebound  EXT : right extremity edema  NEURO : AAOX3   ## Labs:  Lab Results:  CBC  CBC Full  -  ( 03 Dec 2022 03:05 )  WBC Count : 7.25 K/uL  RBC Count : 2.54 M/uL  Hemoglobin : 7.7 g/dL  Hematocrit : 23.8 %  Platelet Count - Automated : 61 K/uL  Mean Cell Volume : 93.7 fl  Mean Cell Hemoglobin : 30.3 pg  Mean Cell Hemoglobin Concentration : 32.4 g/dL  Auto Neutrophil # : 6.07 K/uL  Auto Lymphocyte # : 0.69 K/uL  Auto Monocyte # : 0.35 K/uL  Auto Eosinophil # : 0.07 K/uL  Auto Basophil # : 0.02 K/uL  Auto Neutrophil % : 83.7 %  Auto Lymphocyte % : 9.5 %  Auto Monocyte % : 4.8 %  Auto Eosinophil % : 1.0 %  Auto Basophil % : 0.3 %    .		Differential:	[] Automated		[] Manual  Chemistry                        7.7    7.25  )-----------( 61       ( 03 Dec 2022 03:05 )             23.8     12-03    145  |  110<H>  |  67<H>  ----------------------------<  110<H>  3.4<L>   |  26  |  5.21<H>    Ca    7.1<L>      03 Dec 2022 03:05  Phos  4.4     12-03  Mg     2.0     12-03    TPro  5.2<L>  /  Alb  1.7<L>  /  TBili  0.2  /  DBili  x   /  AST  78<H>  /  ALT  252<H>  /  AlkPhos  605<H>  12-03    LIVER FUNCTIONS - ( 03 Dec 2022 03:05 )  Alb: 1.7 g/dL / Pro: 5.2 gm/dL / ALK PHOS: 605 U/L / ALT: 252 U/L / AST: 78 U/L / GGT: x                 MICROBIOLOGY/CULTURES:  Culture Results:   Culture is being performed. (11-27 @ 15:00)  Culture Results:   Few Pseudomonas aeruginosa  Normal Respiratory Mariah present (11-26 @ 12:20)            ## Medications:  MEDICATIONS  (STANDING):  chlorhexidine 2% Cloths 1 Application(s) Topical <User Schedule>  midodrine 10 milliGRAM(s) Oral every 8 hours  pantoprazole  Injectable 40 milliGRAM(s) IV Push daily  piperacillin/tazobactam IVPB.. 3.375 Gram(s) IV Intermittent every 12 hours  rifAXIMin 550 milliGRAM(s) Oral two times a day    ## O/E:I&O's Summary    02 Dec 2022 07:01  -  03 Dec 2022 07:00  --------------------------------------------------------  IN: 1340 mL / OUT: 1335 mL / NET: 5 mL        DVT PPX refusing scd, low plt for hep  ## Code status:  Goals of care discussion: [] yes [ ] no  [x] full code  [ ] DNR  [ ] DNI  [ ] NICK

## 2022-12-03 NOTE — PHYSICAL THERAPY INITIAL EVALUATION ADULT - LIVES WITH, PROFILE
mother in a private house with 4 entry steps equipped with bilateral hand rails that can be reached simultaneously. Pt's living space is situated in basement; pt has a descend a flight of stair with a single handrail. The bathroom has a stall shower fixed shower head and standard toilet.

## 2022-12-03 NOTE — CHART NOTE - NSCHARTNOTEFT_GEN_A_CORE
62 yr old M with hx (per chart) of chronic pancreatitis s/p pusestow procedure, CKD4, hep C s/p SVR, esophageal stricture w/ stent in April was found unresponsive in basement. In ER patient unresponsive with severe metabolic abnormalities and intubated for airway protection. Labs notable for severe metabolic acidosis, hyperkalemia, elevated ammonia, & elevated troponins. Admitted to ICU at that time with metabolic/ hepatic encephelopathy, ARF requiring emergent HD, NSTEMI, Shock liver and septic shock. PT extubated 11/30/22, now on room air, awake, alert following commands and taking PO. Creatinine improving, no further plans for HD, continue to monitor renal recovery, Renal following. Completed 72 hour heparin gtt for NSTEMI and troponin downtrending, Cardiology following. Continue Zosyn for bacteremia as per ID. PT no longer requiring ICU level of care and cleared for transfer to [facility]. Verbal sign out discussed with MD Win.

## 2022-12-03 NOTE — PHYSICAL THERAPY INITIAL EVALUATION ADULT - BED MOBILITY TRAINING, PT EVAL
Min A in bed mobility- supine<>sit, rolling side<>side observing proper body mechanics, proper positioning, body alignment and precautions.

## 2022-12-03 NOTE — PHYSICAL THERAPY INITIAL EVALUATION ADULT - PERTINENT HX OF CURRENT PROBLEM, REHAB EVAL
61 y/o M w/HCV, emphysema, and chronic pancreatitis admitted for altered mental status. Intubated for acute respiratory failure. Hypotension likely secondary to severe sepsis with septic shock likely secondary to psuedomonal PNA, morganella bacteremia and kelbsiella UTI.

## 2022-12-04 NOTE — PROGRESS NOTE ADULT - SUBJECTIVE AND OBJECTIVE BOX
[facility] NEPHROLOGY SERVICES, [facility]  NEPHROLOGY AND HYPERTENSION  [address]  [address]  [place] [address]  [phone]    MD HALIE OLIVIA, MD DHRUV REMY, MD ROGERIO ESPOSITO, MD SHERON JUAREZ, MD JANICE ARREOLA MD          Patient events noted  No distress    MEDICATIONS  (STANDING):  chlorhexidine 2% Cloths 1 Application(s) Topical <User Schedule>  pantoprazole    Tablet 40 milliGRAM(s) Oral before breakfast  piperacillin/tazobactam IVPB.. 3.375 Gram(s) IV Intermittent every 12 hours  rifAXIMin 550 milliGRAM(s) Oral two times a day    MEDICATIONS  (PRN):  calcium carbonate    500 mG (Tums) Chewable 1 Tablet(s) Chew every 6 hours PRN Upset Stomach  sodium chloride 0.9% lock flush 10 milliLiter(s) IV Push every 1 hour PRN Pre/post blood products, medications, blood draw, and to maintain line patency      12-03-22 @ 07:01  -  12-04-22 @ 07:00  --------------------------------------------------------  IN: 1700 mL / OUT: 254 mL / NET: 1446 mL    12-04-22 @ 07:01  -  12-04-22 @ 22:00  --------------------------------------------------------  IN: 540 mL / OUT: 0 mL / NET: 540 mL      PHYSICAL EXAM:      T(C): 36.8 (12-04-22 @ 15:32), Max: 37 (12-04-22 @ 11:05)  HR: 90 (12-04-22 @ 15:32) (81 - 93)  BP: 118/74 (12-04-22 @ 15:32) (106/68 - 133/76)  RR: 19 (12-04-22 @ 15:32) (18 - 19)  SpO2: 100% (12-04-22 @ 15:32) (96% - 100%)  Wt(kg): --  Lungs clear  Heart S1S2  Abd soft NT ND  Extremities:   tr edema                                    7.5    6.59  )-----------( 106      ( 04 Dec 2022 09:50 )             23.5     12-04    145  |  109<H>  |  68<H>  ----------------------------<  87  3.8   |  26  |  5.86<H>    Ca    6.8<L>      04 Dec 2022 09:50  Phos  4.1     12-04  Mg     1.7     12-04    TPro  5.0<L>  /  Alb  1.7<L>  /  TBili  0.3  /  DBili  x   /  AST  64<H>  /  ALT  205<H>  /  AlkPhos  496<H>  12-04      LIVER FUNCTIONS - ( 04 Dec 2022 09:50 )  Alb: 1.7 g/dL / Pro: 5.0 gm/dL / ALK PHOS: 496 U/L / ALT: 205 U/L / AST: 64 U/L / GGT: x           Creatinine Trend: 5.86<--, 5.21<--, 4.34<--, 4.01<--, 4.58<--, 3.73<--      --, 4.50<--      Assessment   YUNIEL CKD 4; septic shock; ischemic ATN, non oliguric  Increasing UO trend  Appears near euvolemic     Plan      Continue IVF for 1000 ml  Follow respiratory status, UO and Cr trend        Austin Dukes MD [facility] NEPHROLOGY SERVICES, [facility]  NEPHROLOGY AND HYPERTENSION  [address]  [address]  [place] [address]  [phone]    MD HALIE OLIVIA, MD DHRUV REMY, MD ROGERIO ESPOSITO, MD SHERON JUAREZ, MD JANICE ARREOLA MD          Patient events noted  No distress    MEDICATIONS  (STANDING):  chlorhexidine 2% Cloths 1 Application(s) Topical <User Schedule>  pantoprazole    Tablet 40 milliGRAM(s) Oral before breakfast  piperacillin/tazobactam IVPB.. 3.375 Gram(s) IV Intermittent every 12 hours  rifAXIMin 550 milliGRAM(s) Oral two times a day    MEDICATIONS  (PRN):  calcium carbonate    500 mG (Tums) Chewable 1 Tablet(s) Chew every 6 hours PRN Upset Stomach  sodium chloride 0.9% lock flush 10 milliLiter(s) IV Push every 1 hour PRN Pre/post blood products, medications, blood draw, and to maintain line patency      12-03-22 @ 07:01  -  12-04-22 @ 07:00  --------------------------------------------------------  IN: 1700 mL / OUT: 254 mL / NET: 1446 mL    12-04-22 @ 07:01  -  12-04-22 @ 22:00  --------------------------------------------------------  IN: 540 mL / OUT: 0 mL / NET: 540 mL      PHYSICAL EXAM:      T(C): 36.8 (12-04-22 @ 15:32), Max: 37 (12-04-22 @ 11:05)  HR: 90 (12-04-22 @ 15:32) (81 - 93)  BP: 118/74 (12-04-22 @ 15:32) (106/68 - 133/76)  RR: 19 (12-04-22 @ 15:32) (18 - 19)  SpO2: 100% (12-04-22 @ 15:32) (96% - 100%)  Wt(kg): --  Lungs clear  Heart S1S2  Abd soft NT ND  Extremities:   tr edema                                    7.5    6.59  )-----------( 106      ( 04 Dec 2022 09:50 )             23.5     12-04    145  |  109<H>  |  68<H>  ----------------------------<  87  3.8   |  26  |  5.86<H>    Ca    6.8<L>      04 Dec 2022 09:50  Phos  4.1     12-04  Mg     1.7     12-04    TPro  5.0<L>  /  Alb  1.7<L>  /  TBili  0.3  /  DBili  x   /  AST  64<H>  /  ALT  205<H>  /  AlkPhos  496<H>  12-04      LIVER FUNCTIONS - ( 04 Dec 2022 09:50 )  Alb: 1.7 g/dL / Pro: 5.0 gm/dL / ALK PHOS: 496 U/L / ALT: 205 U/L / AST: 64 U/L / GGT: x           Creatinine Trend: 5.86<--, 5.21<--, 4.34<--, 4.01<--, 4.58<--, 3.73<--      --, 4.50<--      Assessment   YUNIEL CKD 4; septic shock; ischemic ATN, non oliguric  Increasing UO trend  Appears near euvolemic     Plan      Continue IVF for 1000 ml  Bladder scan  Follow respiratory status, UO and Cr trend        Austin Dukes MD

## 2022-12-04 NOTE — PROGRESS NOTE ADULT - ASSESSMENT
61 y/o M w/HCV, emphysema, and chronic pancreatitis admitted for altered mental status. Intubated for acute respiratory failure. Hypotension likely secondary to severe sepsis with septic shock likely secondary to psuedomonal PNA, morganella bacteremia and kelbsiella UTI. CT chest with new cavitary lung lesion (not present on scan 8/30/22) favor cavitary PNA over malignancy due to rapid development. YUNIEL on CKD likely ATN. NSTEMI. Acute liver injury, likely shock liver.  Hyperkalemia.     Septic Shock POA due to Pseudo bacteremia  - Continue midodrine Taper down now    Acute respiratory failure due to septic shock  extubated on room air   diuresis held      YUNIEL due to Septic ATN  no plans for HD      Dispo: eventual [facility] for now needs monitoring for ATN and IV abx for Pseudo bacteremia; WIll followup with ID and Renal

## 2022-12-04 NOTE — PROGRESS NOTE ADULT - SUBJECTIVE AND OBJECTIVE BOX
Patient seen and examined  feels well  on diet  good appetite  on IVF fluid challenge as per Nephro for ATN  Cr is 5  for CARMEN  Review of Systems:  General:denies fever chills, headache, weakness  HEENT: denies blurry vision,diffculty swallowing, difficulty hearing, tinnitus  Cardiovascular: denies chest pain  ,palpitations  Pulmonary:denies shortness of breath, cough, wheezing, hemoptysis  Gastrointestinal: denies abdominal pain, constipation, diarrhea,nausea , vomiting, hematochezia  : denies hematuria, dysuria, or incontinence  Neurological: denies weakness, numbness , tingling, dizziness, tremors  MSK: denies muscle pain, difficulty ambulating, swelling, back pain  skin: denies skin rash, itching, burning, or  skin lesions  Psychiatrical: denies mood disturbances, anxierty, feeling depressed, depression , or difficulty sleeping    Objective:  Vitals  T(C): 36.9 (12-04-22 @ 04:41), Max: 37.1 (12-03-22 @ 18:54)  HR: 82 (12-04-22 @ 04:41) (76 - 85)  BP: 106/68 (12-04-22 @ 04:41) (106/68 - 133/76)  RR: 18 (12-04-22 @ 04:41) (10 - 18)  SpO2: 99% (12-04-22 @ 04:41) (99% - 100%)    Physical Exam:  General: comfortable, no acute distress, well nourished  HEENT: Atraumatic, no LAD, trachea midline, PERRLA  Cardiovascular: normal s1s2, no murmurs, gallops or fricition rubs  Pulmonary: clear to ausculation Bilaterally, no wheezing , rhonchi  Gastrointestinal: soft non tender non distended, no masses felt, no organomegally  Muscloskeletal: no lower extremity edema, intact bilateral lower extremity pulses  Neurological: CN II-12 intact. No focal weakness  Psychiatrical: normal mood, cooperative  SKIN: no rash, lesions or ulcers    Labs:                          7.5    6.59  )-----------( x        ( 04 Dec 2022 09:50 )             23.5     12-03    145  |  110<H>  |  67<H>  ----------------------------<  110<H>  3.4<L>   |  26  |  5.21<H>    Ca    7.1<L>      03 Dec 2022 03:05  Phos  4.4     12-03  Mg     2.0     12-03    TPro  5.2<L>  /  Alb  1.7<L>  /  TBili  0.2  /  DBili  x   /  AST  78<H>  /  ALT  252<H>  /  AlkPhos  605<H>  12-03    LIVER FUNCTIONS - ( 03 Dec 2022 03:05 )  Alb: 1.7 g/dL / Pro: 5.2 gm/dL / ALK PHOS: 605 U/L / ALT: 252 U/L / AST: 78 U/L / GGT: x                 Active Medications  MEDICATIONS  (STANDING):  chlorhexidine 2% Cloths 1 Application(s) Topical <User Schedule>  pantoprazole  Injectable 40 milliGRAM(s) IV Push daily  piperacillin/tazobactam IVPB.. 3.375 Gram(s) IV Intermittent every 12 hours  rifAXIMin 550 milliGRAM(s) Oral two times a day    MEDICATIONS  (PRN):  calcium carbonate    500 mG (Tums) Chewable 1 Tablet(s) Chew every 6 hours PRN Upset Stomach  midodrine 10 milliGRAM(s) Oral every 8 hours PRN For SBP < 90  morphine  - Injectable 2 milliGRAM(s) IV Push every 6 hours PRN Moderate Pain (4 - 6)  sodium chloride 0.9% lock flush 10 milliLiter(s) IV Push every 1 hour PRN Pre/post blood products, medications, blood draw, and to maintain line patency

## 2022-12-05 NOTE — PROGRESS NOTE ADULT - ASSESSMENT
Possible necrotizing pneumonia as source of bacteremia.  Wasted appearance but no clear or convincing source of SSTI  No perforation noted related to stent or otherwise, though CT was without IV or enteral contrast  Sputum and urine discordant with blood isolate  Has groin HD cath, not present on admission  Concern that lung process may be polymicrobial abscess  I don't think we need to invoke TB here    11/29: WBC in about the same range, can't expect much change in WBC from a dose or two, FiO2 only 30%. Hopefully some moderation in YUNIEL. LFT improving as well.  11/30: Hypothermia, not a new phenomenon, but recurrent.  No follow-up culture data as of yet.  The setting of recurrent hypothermia, would repeat blood cultures here.  White blood cell count slight decline, platelet count remains low.  Creatinine unfortunately increased.  Liver function test trending down.  Still suspect that bacteremia related to pulmonary process, though FiO2 requirements at this juncture are not great.  12/1: Extiubated and not hypothermic, so some positives steps here. Cultures not obtained as suggested yesterday.  12/2: Overall stable to improved. WBC declined. Exam essentially unchanged.  12/5: downgraded from ICU, no fevers, RA, no leukocytosis, LFTs better, no new culture data, will repeat BCs, today is day 11 of all abx, Zosyn IV continued, awaiting for CT a/p.     Suggestions  Continue Zosyn  Trend CBC  Follow-up creatinine  repeat CT a/p  repeat BCs x 2 - order is in   monitor pt's LFTs    Discussed with Dr. Cavazos

## 2022-12-05 NOTE — PROGRESS NOTE ADULT - SUBJECTIVE AND OBJECTIVE BOX
IMELDA ROBERTSON  MRN-[id number]    Follow Up:  bacteremia, PNA    Interval History: the pt was seen and examined earlier, no distress, awake and alert, able to state his name and birthday, place, year and president, still some element of confusion is present, the pt states that he thinks that he will be leaving home this evening. The pt is afebrile, RA, complains of hiccups.     PAST MEDICAL & SURGICAL HISTORY:  ETOH abuse  sober x1 year approximately      Pancreatitis      Hepatitis C  treated      Gout      HTN (hypertension)      Chronic kidney disease (CKD)      H/O chronic pancreatitis      Gout      Alcohol abuse      Gastric perforation          ROS:    [ ] Unobtainable because:  [x ] All other systems negative    Constitutional: no fever, no chills  Head: no trauma  Eyes: no vision changes, no eye pain  ENT:  no sore throat, no rhinorrhea  Cardiovascular:  no chest pain, no palpitation  Respiratory:  no SOB, no cough  GI:  no abd pain, no vomiting, no diarrhea, + hiccups   urinary: no dysuria, no hematuria, no flank pain  musculoskeletal:  no joint pain, no joint swelling  skin:  no rash  neurology:  no headache, no seizure, no change in mental status  psych: no anxiety, no depression         Allergies  Tylenol (Unknown)        ANTIMICROBIALS:  piperacillin/tazobactam IVPB.. 3.375 every 12 hours  rifAXIMin 550 two times a day      OTHER MEDS:  calcium carbonate    500 mG (Tums) Chewable 1 Tablet(s) Chew every 6 hours PRN  chlorhexidine 2% Cloths 1 Application(s) Topical <User Schedule>  pantoprazole    Tablet 40 milliGRAM(s) Oral before breakfast  sodium chloride 0.9% lock flush 10 milliLiter(s) IV Push every 1 hour PRN  sodium zirconium cyclosilicate 10 Gram(s) Oral every 8 hours      Vital Signs Last 24 Hrs  T(C): 37.1 (05 Dec 2022 11:30), Max: 37.1 (05 Dec 2022 11:30)  T(F): 98.7 (05 Dec 2022 11:30), Max: 98.7 (05 Dec 2022 11:30)  HR: 92 (05 Dec 2022 15:23) (88 - 97)  BP: 134/80 (05 Dec 2022 15:23) (110/75 - 138/76)  BP(mean): --  RR: 19 (05 Dec 2022 11:30) (18 - 19)  SpO2: 95% (05 Dec 2022 15:23) (95% - 100%)    Parameters below as of 05 Dec 2022 15:23  Patient On (Oxygen Delivery Method): room air        Physical Exam:  General: Crronically ill-appearing Male in no acute distress. Very thin male   HEENT: Bitemporal wasting. Anicteric. Conjunctiva pink and moist. Isabela grossly clear.  Neck: Not rigid. No sense of mass. CVL C/D/I.  Nodes: None palpable.  Lungs: Diminished BS Bilaterally, no wheezes, no rhonchi   Heart: Regular rate and rhythm. No audible murmur   Abdomen: Bowel sounds present and normoactive. Soft. Nondistended. Nontender. No sense of mass. No organomegaly.  Back: Unable.   Extremities: No cyanosis or clubbing. trace edema.  Wasted   Skin: Warm. Dry. Good turgor. No rash. No vasculitic stigmata.  Psychiatric: Grossly appropriate mood and affect.    WBC Count: 8.37 K/uL (12-05 @ 06:40)  WBC Count: 6.59 K/uL (12-04 @ 09:50)  WBC Count: 7.25 K/uL (12-03 @ 03:05)  WBC Count: 8.15 K/uL (12-02 @ 01:55)  WBC Count: 12.64 K/uL (12-01 @ 02:45)  WBC Count: 14.13 K/uL (11-30 @ 03:00)  WBC Count: 23.42 K/uL (11-29 @ 02:30)                            7.6    8.37  )-----------( 136      ( 05 Dec 2022 06:40 )             24.4       12-05    144  |  116<H>  |  70<H>  ----------------------------<  76  5.8<H>   |  23  |  5.84<H>    Ca    7.3<L>      05 Dec 2022 06:40  Phos  4.1     12-04  Mg     1.7     12-05    TPro  5.3<L>  /  Alb  1.7<L>  /  TBili  0.3  /  DBili  x   /  AST  44<H>  /  ALT  168<H>  /  AlkPhos  470<H>  12-05          Creatinine Trend: 5.84<--, 5.86<--, 5.21<--, 4.34<--, 4.01<--, 4.58<--      MICROBIOLOGY:  v  ET Tube ET Tube  11-27-22   Culture is being performed.  --  --      ET Tube ET Tube  11-26-22   Few Pseudomonas aeruginosa  Normal Respiratory Mariah present  --  Pseudomonas aeruginosa      ET Tube ET Tube  11-25-22   Normal Respiratory Mariah present  --    Few polymorphonuclear leukocytes per low power field  No Squamous epithelial cells per low power field  Rare Gram Negative Rods per oil power field  Rare Gram Positive Cocci in Pairs and Chains per oil power field      Clean Catch Clean Catch (Midstream)  11-25-22   >100,000 CFU/ml Klebsiella pneumoniae  --  Klebsiella pneumoniae      .Blood Blood-Peripheral  11-25-22   Growth in anaerobic bottle: Morganella morganii  See previous culture [id number]  --  Blood Culture PCR  Morganella morganii      D-Dimer Assay, Quantitative: 3160 (11-30)        SARS-CoV-2: NotDetec (25 Nov 2022 01:46)  COVID-19 PCR: NotDetec (19 Jul 2022 05:41)  COVID-19 PCR: NotDetec (13 Jul 2022 07:03)  SARS-CoV-2: NotDetec (06 Jul 2022 15:07)    RADIOLOGY:

## 2022-12-05 NOTE — PROGRESS NOTE ADULT - SUBJECTIVE AND OBJECTIVE BOX
Patient seen and examined  pleasantly confused  CR uptrending to 5.8 today  vitals stable  on IV zosyn for bacteremia  Review of Systems:  General:denies fever chills, headache, weakness  HEENT: denies blurry vision,diffculty swallowing, difficulty hearing, tinnitus  Cardiovascular: denies chest pain  ,palpitations  Pulmonary:denies shortness of breath, cough, wheezing, hemoptysis  Gastrointestinal: denies abdominal pain, constipation, diarrhea,nausea , vomiting, hematochezia  : denies hematuria, dysuria, or incontinence  Neurological: denies weakness, numbness , tingling, dizziness, tremors  MSK: denies muscle pain, difficulty ambulating, swelling, back pain  skin: denies skin rash, itching, burning, or  skin lesions  Psychiatrical: denies mood disturbances, anxierty, feeling depressed, depression , or difficulty sleeping    Objective:  Vitals  T(C): 37.1 (12-05-22 @ 11:30), Max: 37.1 (12-05-22 @ 11:30)  HR: 97 (12-05-22 @ 11:30) (88 - 97)  BP: 110/75 (12-05-22 @ 11:30) (110/75 - 138/76)  RR: 19 (12-05-22 @ 11:30) (18 - 19)  SpO2: 99% (12-05-22 @ 11:30) (99% - 100%)    Physical Exam:  General: comfortable, no acute distress, well nourished  HEENT: Atraumatic, no LAD, trachea midline, PERRLA  Cardiovascular: normal s1s2, no murmurs, gallops or fricition rubs  Pulmonary: clear to ausculation Bilaterally, no wheezing , rhonchi  Gastrointestinal: soft non tender non distended, no masses felt, no organomegally  Muscloskeletal: no lower extremity edema, intact bilateral lower extremity pulses  Neurological: CN II-12 intact. No focal weakness  Psychiatrical: normal mood, cooperative  SKIN: no rash, lesions or ulcers    Labs:                          7.6    8.37  )-----------( 136      ( 05 Dec 2022 06:40 )             24.4     12-05    144  |  116<H>  |  70<H>  ----------------------------<  76  5.8<H>   |  23  |  5.84<H>    Ca    7.3<L>      05 Dec 2022 06:40  Phos  4.1     12-04  Mg     1.7     12-05    TPro  5.3<L>  /  Alb  1.7<L>  /  TBili  0.3  /  DBili  x   /  AST  44<H>  /  ALT  168<H>  /  AlkPhos  470<H>  12-05    LIVER FUNCTIONS - ( 05 Dec 2022 06:40 )  Alb: 1.7 g/dL / Pro: 5.3 gm/dL / ALK PHOS: 470 U/L / ALT: 168 U/L / AST: 44 U/L / GGT: x                 Active Medications  MEDICATIONS  (STANDING):  chlorhexidine 2% Cloths 1 Application(s) Topical <User Schedule>  iohexol 300 mG (iodine)/mL Oral Solution 30 milliLiter(s) Oral once  pantoprazole    Tablet 40 milliGRAM(s) Oral before breakfast  piperacillin/tazobactam IVPB.. 3.375 Gram(s) IV Intermittent every 12 hours  rifAXIMin 550 milliGRAM(s) Oral two times a day  sodium zirconium cyclosilicate 10 Gram(s) Oral every 8 hours    MEDICATIONS  (PRN):  calcium carbonate    500 mG (Tums) Chewable 1 Tablet(s) Chew every 6 hours PRN Upset Stomach  sodium chloride 0.9% lock flush 10 milliLiter(s) IV Push every 1 hour PRN Pre/post blood products, medications, blood draw, and to maintain line patency

## 2022-12-05 NOTE — PROGRESS NOTE ADULT - SUBJECTIVE AND OBJECTIVE BOX
[facility] NEPHROLOGY SERVICES, [facility]  NEPHROLOGY AND HYPERTENSION  [address]  [address]  [place] [address]  [phone]    MD HALIE OLIVIA, MD DHRUV REMY, MD ROGERIO ESPOSITO, MD SHERON JUAREZ, MD JANICE ARREOLA MD          Patient events noted    MEDICATIONS  (STANDING):  chlorhexidine 2% Cloths 1 Application(s) Topical <User Schedule>  pantoprazole    Tablet 40 milliGRAM(s) Oral before breakfast  piperacillin/tazobactam IVPB.. 3.375 Gram(s) IV Intermittent every 12 hours  rifAXIMin 550 milliGRAM(s) Oral two times a day  sodium zirconium cyclosilicate 10 Gram(s) Oral every 8 hours    MEDICATIONS  (PRN):  calcium carbonate    500 mG (Tums) Chewable 1 Tablet(s) Chew every 6 hours PRN Upset Stomach  sodium chloride 0.9% lock flush 10 milliLiter(s) IV Push every 1 hour PRN Pre/post blood products, medications, blood draw, and to maintain line patency      12-04-22 @ 07:01  -  12-05-22 @ 07:00  --------------------------------------------------------  IN: 540 mL / OUT: 4 mL / NET: 536 mL    12-05-22 @ 07:01  -  12-05-22 @ 22:04  --------------------------------------------------------  IN: 660 mL / OUT: 3 mL / NET: 657 mL      PHYSICAL EXAM:      T(C): 36.7 (12-05-22 @ 16:43), Max: 37.1 (12-05-22 @ 11:30)  HR: 92 (12-05-22 @ 15:23) (88 - 97)  BP: 130/73 (12-05-22 @ 16:43) (110/75 - 138/76)  RR: 19 (12-05-22 @ 16:43) (18 - 19)  SpO2: 95% (12-05-22 @ 16:43) (95% - 100%)  Wt(kg): --  Lungs clear  Heart S1S2  Abd soft NT moderate distention   Extremities:   tr edema                                    7.6    8.37  )-----------( 136      ( 05 Dec 2022 06:40 )             24.4     12-05    144  |  116<H>  |  70<H>  ----------------------------<  76  5.8<H>   |  23  |  5.84<H>    Ca    7.3<L>      05 Dec 2022 06:40  Phos  4.1     12-04  Mg     1.7     12-05    TPro  5.3<L>  /  Alb  1.7<L>  /  TBili  0.3  /  DBili  x   /  AST  44<H>  /  ALT  168<H>  /  AlkPhos  470<H>  12-05      LIVER FUNCTIONS - ( 05 Dec 2022 06:40 )  Alb: 1.7 g/dL / Pro: 5.3 gm/dL / ALK PHOS: 470 U/L / ALT: 168 U/L / AST: 44 U/L / GGT: x           Creatinine Trend: 5.84<--, 5.86<--, 5.21<--, 4.34<--, 4.01<--, 4.58<--    Trend Bun/Cr  12-05-22 @ 06:40  BUN/CR -  70<H> / 5.84<H>  12-04-22 @ 09:50  BUN/CR -  68<H> / 5.86<H>  12-03-22 @ 03:05  BUN/CR -  67<H> / 5.21<H>  12-02-22 @ 01:55  BUN/CR -  64<H> / 4.34<H>  12-01-22 @ 02:45  BUN/CR -  74<H> / 4.01<H>  11-30-22 @ 03:00  BUN/CR -  86<H> / 4.58<H>  11-29-22 @ 02:30  BUN/CR -  74<H> / 3.73<H>  11-28-22 @ 12:20  BUN/CR -  87<H> / 4.50<H>  11-28-22 @ 04:00  BUN/CR -  93<H> / 4.50<H>  11-27-22 @ 19:10  BUN/CR -  83<H> / 4.07<H>  11-27-22 @ 08:40  BUN/CR -  119<H> / 5.40<H>  11-27-22 @ 02:24  BUN/CR -  116<H> / 5.44<H>    Basic Metabolic Panel in AM (09.05.22 @ 06:06)    Creatinine, Serum: 3.38 mg/dL          Assessment   YUNIEL CKD 4; septic shock; ischemic ATN, non oliguric  Increasing UO trend      Plan  osvaldoma prn  Continue IVF  Follow respiratory status, UO and Cr trend, will decide on resuming HD support     Austin Dukes MD

## 2022-12-06 NOTE — PROGRESS NOTE ADULT - SUBJECTIVE AND OBJECTIVE BOX
Patient seen and examined  feels well denies complaints  hungry  Cr at 6.21  ct abdomen obtained  +constipation and chronic pancreatitis    Review of Systems:  General:denies fever chills, headache, weakness  HEENT: denies blurry vision,diffculty swallowing, difficulty hearing, tinnitus  Cardiovascular: denies chest pain  ,palpitations  Pulmonary:denies shortness of breath, cough, wheezing, hemoptysis  Gastrointestinal: denies abdominal pain, constipation, diarrhea,nausea , vomiting, hematochezia  : denies hematuria, dysuria, or incontinence  Neurological: denies weakness, numbness , tingling, dizziness, tremors  MSK: denies muscle pain, difficulty ambulating, swelling, back pain  skin: denies skin rash, itching, burning, or  skin lesions  Psychiatrical: denies mood disturbances, anxierty, feeling depressed, depression , or difficulty sleeping    Objective:  Vitals  T(C): 36.7 (12-06-22 @ 11:13), Max: 36.9 (12-06-22 @ 04:36)  HR: 77 (12-06-22 @ 12:30) (77 - 92)  BP: 132/77 (12-06-22 @ 12:30) (124/74 - 150/85)  RR: 19 (12-06-22 @ 11:13) (18 - 19)  SpO2: 100% (12-06-22 @ 12:30) (95% - 100%)    Physical Exam:  General: comfortable, no acute distress, well nourished  HEENT: Atraumatic, no LAD, trachea midline, PERRLA  Cardiovascular: normal s1s2, no murmurs, gallops or fricition rubs  Pulmonary: clear to ausculation Bilaterally, no wheezing , rhonchi  Gastrointestinal: soft non tender non distended, no masses felt, no organomegally  Muscloskeletal: no lower extremity edema, intact bilateral lower extremity pulses  Neurological: CN II-12 intact. No focal weakness  Psychiatrical: normal mood, cooperative  SKIN: no rash, lesions or ulcers    Labs:                          7.7    7.79  )-----------( 210      ( 06 Dec 2022 11:34 )             23.8     12-06    146<H>  |  114<H>  |  61<H>  ----------------------------<  313<H>  3.1<L>   |  20<L>  |  6.21<H>    Ca    7.1<L>      06 Dec 2022 11:34  Mg     1.7     12-05    TPro  5.3<L>  /  Alb  1.7<L>  /  TBili  0.3  /  DBili  x   /  AST  44<H>  /  ALT  168<H>  /  AlkPhos  470<H>  12-05    LIVER FUNCTIONS - ( 05 Dec 2022 06:40 )  Alb: 1.7 g/dL / Pro: 5.3 gm/dL / ALK PHOS: 470 U/L / ALT: 168 U/L / AST: 44 U/L / GGT: x                 Active Medications  MEDICATIONS  (STANDING):  bisacodyl Suppository 10 milliGRAM(s) Rectal once  chlorhexidine 2% Cloths 1 Application(s) Topical <User Schedule>  pantoprazole    Tablet 40 milliGRAM(s) Oral before breakfast  piperacillin/tazobactam IVPB.. 3.375 Gram(s) IV Intermittent every 12 hours  polyethylene glycol 3350 17 Gram(s) Oral once  potassium chloride    Tablet ER 40 milliEquivalent(s) Oral every 4 hours  rifAXIMin 550 milliGRAM(s) Oral two times a day  sodium chloride 0.45%. 1000 milliLiter(s) (75 mL/Hr) IV Continuous <Continuous>    MEDICATIONS  (PRN):  calcium carbonate    500 mG (Tums) Chewable 1 Tablet(s) Chew every 6 hours PRN Upset Stomach  sodium chloride 0.9% lock flush 10 milliLiter(s) IV Push every 1 hour PRN Pre/post blood products, medications, blood draw, and to maintain line patency

## 2022-12-06 NOTE — CHART NOTE - NSCHARTNOTEFT_GEN_A_CORE
Per chart pt with PMH: pancreatitis, CKD4, esophageal stricture, EtOH use, HTN, presents unresponsive with FTT, intubated on admission 11/25, extubated 11/30. Pt with hyperkalemia- receiving Lokelma. Awake and alert at time of visit- yelling for RN; RN aware. No plan for HD as of yet per MD note (12/05). Nephrology following.    Factors impacting intake: [ ] none [ ] nausea  [ ] vomiting [ ] diarrhea [ ] constipation  [ ]chewing problems [ ] swallowing issues  [x] other: Was on clears    Diet Prescription: Diet, DASH/TLC:   Sodium & Cholesterol Restricted (12-06-22 @ 10:05)    Intake: Pt advanced to regular diet at time of writing; was on clears x 1 day prior to that.     Current Weight: (12/06) 57.1kg (12/03) 44.8kg (11/25) 42.3kg  % Weight Change: ?accuracy of 12/06 bed scale weight    Edema: 1+ left and right feet as per flow sheets    Physical Appearance: severe muscle wasting in temporal and clavicle regions noted and severe fat loss in orbital, triceps regions    Pertinent Medications: MEDICATIONS  (STANDING):  chlorhexidine 2% Cloths 1 Application(s) Topical <User Schedule>  pantoprazole    Tablet 40 milliGRAM(s) Oral before breakfast  piperacillin/tazobactam IVPB.. 3.375 Gram(s) IV Intermittent every 12 hours  rifAXIMin 550 milliGRAM(s) Oral two times a day  sodium chloride 0.45%. 1000 milliLiter(s) (75 mL/Hr) IV Continuous <Continuous>    MEDICATIONS  (PRN):  calcium carbonate    500 mG (Tums) Chewable 1 Tablet(s) Chew every 6 hours PRN Upset Stomach  sodium chloride 0.9% lock flush 10 milliLiter(s) IV Push every 1 hour PRN Pre/post blood products, medications, blood draw, and to maintain line patency    Pertinent Labs: 12-05 Na144 mmol/L Glu 76 mg/dL K+ 5.8 mmol/L<H> Cr  5.84 mg/dL<H> BUN 70 mg/dL<H> 12-04 Phos 4.1 mg/dL 12-05 Alb 1.7 g/dL<L>      Skin: stage II sacral pressure injury and unstageable coccyx pressure injury as per flow sheets    Estimated Needs:   [x] no change since previous assessment: 11/28/22  [ ] recalculated:     Previous Nutrition Diagnosis:   [x]  Inadequate Oral Intake  Etiology	Decreased ability to consume sufficient energy  Signs/Symptoms	Goal rate for TF has not yet been achieved  Goal/Expected Outcome	Pt will Meet calorie and protein needs > 50% via TF as medically feasible    [x] Nutrition Diagnostic Terminology #2	Increased Nutrient Needs...  Increased Nutrient Needs	Calories /protein  Etiology	Increased Demand for nutrient  Signs/Symptoms	Pressure ulcer stage 2 or > ; Adult FTT Chronic pancreatitis ; Multiple Hospitalizations  Goal/Expected Outcome	Pt will Meet calorie and protein needs via TF      Nutrition Diagnosis is [ ] ongoing  [ ] resolved [x] not applicable     New Nutrition Diagnosis: [x] Malnutrition- Severe malnutrition in the context of chronic illness      Related to: Inadequate protein-energy intake and increased protein-energy needs in setting of pressure injuries, CKD    As evidenced by: Physical findings of severe muscle wasting and fat loss     Goal: Pt to consume >75% meals/supplements during LOS      Interventions:   Recommend  [x] Change Diet To: low sodium, 60g protein, no conc potassium  [x] Nutrition Supplement: Add Suplena x 2/day (provides 850 kcal, 22 g protein)   [ ] Nutrition Support  [x] Other: Continue to provide assistance and encouragement with PO intake     Monitoring and Evaluation:   [x] PO intake [ x ] Tolerance to diet prescription [ x ] weights [ x ] labs[ x ] follow up per protocol  [ ] other: Per chart pt with PMH: pancreatitis, CKD4, esophageal stricture, EtOH use, HTN, presents unresponsive with FTT, intubated on admission 11/25, extubated 11/30. Pt with hyperkalemia- receiving Lokelma. Awake and alert at time of visit- yelling for RN; RN aware. No plan for HD as of yet per MD note (12/05). Nephrology following.    Factors impacting intake: [ ] none [ ] nausea  [ ] vomiting [ ] diarrhea [ ] constipation  [ ]chewing problems [ ] swallowing issues  [x] other: Was on clears    Diet Prescription: Diet, DASH/TLC:   Sodium & Cholesterol Restricted (12-06-22 @ 10:05)    Intake: Pt advanced to regular diet at time of writing; was on clears x 1 day prior to that.     Current Weight: (12/06) 57.1kg (12/03) 44.8kg (11/25) 42.3kg  % Weight Change: ?accuracy of 12/06 bed scale weight    Edema: 1+ left and right feet as per flow sheets    Physical Appearance: severe muscle wasting in temporal and clavicle regions noted and severe fat loss in orbital, triceps regions    Pertinent Medications: MEDICATIONS  (STANDING):  chlorhexidine 2% Cloths 1 Application(s) Topical <User Schedule>  pantoprazole    Tablet 40 milliGRAM(s) Oral before breakfast  piperacillin/tazobactam IVPB.. 3.375 Gram(s) IV Intermittent every 12 hours  rifAXIMin 550 milliGRAM(s) Oral two times a day  sodium chloride 0.45%. 1000 milliLiter(s) (75 mL/Hr) IV Continuous <Continuous>    MEDICATIONS  (PRN):  calcium carbonate    500 mG (Tums) Chewable 1 Tablet(s) Chew every 6 hours PRN Upset Stomach  sodium chloride 0.9% lock flush 10 milliLiter(s) IV Push every 1 hour PRN Pre/post blood products, medications, blood draw, and to maintain line patency    Pertinent Labs: 12-05 Na144 mmol/L Glu 76 mg/dL K+ 5.8 mmol/L<H> Cr  5.84 mg/dL<H> BUN 70 mg/dL<H> 12-04 Phos 4.1 mg/dL 12-05 Alb 1.7 g/dL<L>      Skin: stage II sacral pressure injury and unstageable coccyx pressure injury as per flow sheets    Estimated Needs:   [x] no change since previous assessment for kcal and fluids: 11/28/22  [x] recalculated for protein based on IBW: 0.8-1.2g/kg protein= 60-90g/day protein     Previous Nutrition Diagnosis:   [x]  Inadequate Oral Intake  Etiology	Decreased ability to consume sufficient energy  Signs/Symptoms	Goal rate for TF has not yet been achieved  Goal/Expected Outcome	Pt will Meet calorie and protein needs > 50% via TF as medically feasible    [x] Nutrition Diagnostic Terminology #2	Increased Nutrient Needs...  Increased Nutrient Needs	Calories /protein  Etiology	Increased Demand for nutrient  Signs/Symptoms	Pressure ulcer stage 2 or > ; Adult FTT Chronic pancreatitis ; Multiple Hospitalizations  Goal/Expected Outcome	Pt will Meet calorie and protein needs via TF      Nutrition Diagnosis is [ ] ongoing  [ ] resolved [x] not applicable     New Nutrition Diagnosis: [x] Malnutrition- Severe malnutrition in the context of chronic illness      Related to: Inadequate protein-energy intake and increased protein-energy needs in setting of pressure injuries, CKD, multiple hospitalizations x 2 years    As evidenced by: Physical findings of severe muscle wasting and fat loss     Goal: Pt to consume >75% meals/supplements during LOS      Interventions:   Recommend  [x] Change Diet To: low sodium, 60g protein, no conc potassium  [x] Nutrition Supplement: Add Suplena x 2/day (provides 850 kcal, 22 g protein)   [ ] Nutrition Support  [x] Other: Continue to provide assistance and encouragement with PO intake     Monitoring and Evaluation:   [x] PO intake [ x ] Tolerance to diet prescription [ x ] weights [ x ] labs[ x ] follow up per protocol  [ ] other:

## 2022-12-06 NOTE — DIETITIAN NUTRITION RISK NOTIFICATION - TREATMENT: THE FOLLOWING DIET HAS BEEN RECOMMENDED
Diet, NPO with Tube Feed:   Tube Feeding Modality: Nasogastric  Nepro with Carb Steady  Total Volume for 24 Hours (mL): 480  Continuous  Starting Tube Feed Rate {mL per Hour}: 20  Until Goal Tube Feed Rate (mL per Hour): 20  Tube Feed Duration (in Hours): 24  Tube Feed Start Time: 12:00 (11-26-22 @ 11:39) [Active]      
Diet, Regular:   60 gm Protein (PRO60G)  No Concentrated Potassium  Low Sodium  Supplement Feeding Modality:  Oral  Suplena Cans or Servings Per Day:  1       Frequency:  Two Times a day (12-06-22 @ 13:11) [Pending Verification By Attending]  Diet, DASH/TLC:   Sodium & Cholesterol Restricted (12-06-22 @ 10:05) [Active]

## 2022-12-06 NOTE — PROGRESS NOTE ADULT - SUBJECTIVE AND OBJECTIVE BOX
IMELDA ROBERTSON  MRN-[id number]    Follow Up:  bacteremia, pna    Interval History: the pt was seen and examined earlier, no distress, sitting at the edge of his bed, having his lunch, reports having a bowel movement earlier, denies abd pain. The pt is afebrile, RA, no WBC.     PAST MEDICAL & SURGICAL HISTORY:  ETOH abuse  sober x1 year approximately      Pancreatitis      Hepatitis C  treated      Gout      HTN (hypertension)      Chronic kidney disease (CKD)      H/O chronic pancreatitis      Gout      Alcohol abuse      Gastric perforation          ROS:    [ ] Unobtainable because:  [ x] All other systems negative    Constitutional: no fever, no chills  Head: no trauma  Eyes: no vision changes, no eye pain  ENT:  no sore throat, no rhinorrhea  Cardiovascular:  no chest pain, no palpitation  Respiratory:  no SOB, no cough  GI:  no abd pain, no vomiting, no diarrhea  urinary: no dysuria, no hematuria, no flank pain  musculoskeletal:  no joint pain, no joint swelling  skin:  no rash  neurology:  no headache, no seizure, no change in mental status  psych: no anxiety, no depression         Allergies  Tylenol (Unknown)        ANTIMICROBIALS:  piperacillin/tazobactam IVPB.. 3.375 every 12 hours  rifAXIMin 550 two times a day      OTHER MEDS:  calcium carbonate    500 mG (Tums) Chewable 1 Tablet(s) Chew every 6 hours PRN  chlorhexidine 2% Cloths 1 Application(s) Topical <User Schedule>  pantoprazole    Tablet 40 milliGRAM(s) Oral before breakfast  potassium chloride    Tablet ER 40 milliEquivalent(s) Oral every 4 hours  sodium chloride 0.45%. 1000 milliLiter(s) IV Continuous <Continuous>  sodium chloride 0.9% lock flush 10 milliLiter(s) IV Push every 1 hour PRN      Vital Signs Last 24 Hrs  T(C): 36.7 (06 Dec 2022 11:13), Max: 36.9 (06 Dec 2022 04:36)  T(F): 98 (06 Dec 2022 11:13), Max: 98.5 (06 Dec 2022 04:36)  HR: 77 (06 Dec 2022 12:30) (77 - 90)  BP: 132/77 (06 Dec 2022 12:30) (124/74 - 150/85)  BP(mean): --  RR: 19 (06 Dec 2022 11:13) (18 - 19)  SpO2: 100% (06 Dec 2022 12:30) (95% - 100%)    Parameters below as of 06 Dec 2022 12:30  Patient On (Oxygen Delivery Method): room air        Physical Exam:  General: Crronically ill-appearing Male in no acute distress. Very thin male   HEENT: Bitemporal wasting. Anicteric. Conjunctiva pink and moist. Isabela grossly clear.  Neck: Not rigid. No sense of mass. CVL C/D/I.  Nodes: None palpable.  Lungs: Diminished BS Bilaterally, no wheezes, no rhonchi   Heart: Regular rate and rhythm. No audible murmur   Abdomen: Bowel sounds present and normoactive. Soft. Nondistended. Nontender. No sense of mass. No organomegaly.  Back: no CVA tenderness, no wound visualized   Extremities: No cyanosis or clubbing. edema improved.  Wasted   Skin: Warm. Dry. Good turgor. No rash. No vasculitic stigmata.  Psychiatric: Grossly appropriate mood and affect.    WBC Count: 7.79 K/uL (12-06 @ 11:34)  WBC Count: 8.37 K/uL (12-05 @ 06:40)  WBC Count: 6.59 K/uL (12-04 @ 09:50)  WBC Count: 7.25 K/uL (12-03 @ 03:05)  WBC Count: 8.15 K/uL (12-02 @ 01:55)  WBC Count: 12.64 K/uL (12-01 @ 02:45)  WBC Count: 14.13 K/uL (11-30 @ 03:00)                            7.7    7.79  )-----------( 210      ( 06 Dec 2022 11:34 )             23.8       12-06    146<H>  |  114<H>  |  61<H>  ----------------------------<  313<H>  3.1<L>   |  20<L>  |  6.21<H>    Ca    7.1<L>      06 Dec 2022 11:34  Mg     1.7     12-05    TPro  5.3<L>  /  Alb  1.7<L>  /  TBili  0.3  /  DBili  x   /  AST  44<H>  /  ALT  168<H>  /  AlkPhos  470<H>  12-05          Creatinine Trend: 6.21<--, 5.84<--, 5.86<--, 5.21<--, 4.34<--, 4.01<--      MICROBIOLOGY:  v  ET Tube ET Tube  11-27-22   Culture is being performed.  --  --      ET Tube ET Tube  11-26-22   Few Pseudomonas aeruginosa  Normal Respiratory Mariah present  --  Pseudomonas aeruginosa      ET Tube ET Tube  11-25-22   Normal Respiratory Mariah present  --    Few polymorphonuclear leukocytes per low power field  No Squamous epithelial cells per low power field  Rare Gram Negative Rods per oil power field  Rare Gram Positive Cocci in Pairs and Chains per oil power field      Clean Catch Clean Catch (Midstream)  11-25-22   >100,000 CFU/ml Klebsiella pneumoniae  --  Klebsiella pneumoniae      .Blood Blood-Peripheral  11-25-22   Growth in anaerobic bottle: Morganella morganii  See previous culture [id number]  --  Blood Culture PCR  Morganella morganii    D-Dimer Assay, Quantitative: 3160 (11-30)        SARS-CoV-2: NotDetec (25 Nov 2022 01:46)  COVID-19 PCR: NotDetec (19 Jul 2022 05:41)  COVID-19 PCR: NotDetec (13 Jul 2022 07:03)  SARS-CoV-2: NotDetec (06 Jul 2022 15:07)    RADIOLOGY:    < from: CT Abdomen and Pelvis w/ Oral Cont (12.05.22 @ 18:31) >  ACC: [id number] EXAM:  CT ABDOMEN AND PELVIS OC                          PROCEDURE DATE:  12/05/2022          INTERPRETATION:  CLINICAL INFORMATION: Persistent abdominal pain.   Pancreatitis, hepatitis C, esophageal strictures.    COMPARISON: November 25, 2022    CONTRAST/COMPLICATIONS:  IV Contrast: NONE  Oral Contrast: NONE  Complications: None reported at time of study completion    PROCEDURE:  CT of the Abdomen and Pelvis was performed.  Sagittal and coronal reformats were performed.    FINDINGS:  LOWER CHEST: Distal end of the esophageal stent is seen. There is debris   within the stent. Evaluation limited by lack of IV contrast material.      LIVER: Within normal limits.  BILE DUCTS: Poorly visualized. Minimal extrahepatic biliary dilatation.  GALLBLADDER: Within normal limits.  SPLEEN: Within normal limits.  PANCREAS: Diffuse calcifications throughout the pancreas consistent with   chronic calcific pancreatitis.  ADRENALS: Not well visualized.  KIDNEYS/URETERS: Cyst in the upper pole the LEFT kidney.    BLADDER: Within normal limits.  REPRODUCTIVE ORGANS: Normal prostate. There is calcification seen within   the seminal vesicles.    BOWEL: Large amount of stool seen within the rectal vault as well as the   sigmoid colon. A large amount of stool seen throughout the remainder of   the colon consistent with obstipation/constipation. Contrast reaches the   LEFT colon. There is extremely poor visualization of the stomach. The   stomach is collapsed with evidence of submucosal edema throughout the   stomach. Findings suggest gastritis. Appendix is not visualized. No   evidence of inflammation in the pericecal region.  PERITONEUM: No ascites.  VESSELS: Atherosclerotic changes.  RETROPERITONEUM/LYMPH NODES: No lymphadenopathy.  ABDOMINAL WALL: There is little to no subcutaneous or intra-abdominal   fat. Cachexia.  BONES: Within normal limits.    IMPRESSION:  1.  Esophageal stent in place which is partially filled with   debris/ingested material.  2.  The stomach is collapsed. There is gastric wall thickening with   submucosal edema. Probable gastritis.  3.  Chronic calcific pancreatitis.  4.  Large amount of stool throughout the colon consistent with   obstipation/constipation.          --- End of Report ---            SEDRICK BLUE MD; Attending Radiologist  This document has been electronically signed. Dec  5 2022  7:06PM    < end of copied text >

## 2022-12-06 NOTE — PROGRESS NOTE ADULT - ASSESSMENT
63 y/o M w/HCV, emphysema, and chronic pancreatitis admitted for altered mental status. Intubated for acute respiratory failure. Hypotension likely secondary to severe sepsis with septic shock likely secondary to psuedomonal PNA, morganella bacteremia and kelbsiella UTI. CT chest with new cavitary lung lesion (not present on scan 8/30/22) favor cavitary PNA over malignancy due to rapid development. YUNIEL on CKD likely ATN. NSTEMI. Acute liver injury, likely shock liver.  Hyperkalemia.     Septic Shock POA due to Pseudo bacteremia  - taper down midodrine  -c/w zosyn until 12/8      Acute respiratory failure due to septic shock  extubated on room air   diuresis held      YUNIEL due to Septic ATN  no plans for HD  cr at 6.21    constipation on CT  dulcolax and miralax    Dispo: eventual CARMEN for now needs monitoring for ATN and IV abx for Pseudo bacteremia; WIll followup with ID and Renal     61 y/o M w/HCV, emphysema, and chronic pancreatitis admitted for altered mental status. Intubated for acute respiratory failure. Hypotension likely secondary to severe sepsis with septic shock likely secondary to psuedomonal PNA, morganella bacteremia and kelbsiella UTI. CT chest with new cavitary lung lesion (not present on scan 8/30/22) favor cavitary PNA over malignancy due to rapid development. YUNIEL on CKD likely ATN. NSTEMI. Acute liver injury, likely shock liver.  Hyperkalemia.     Septic Shock POA due to psuedomonal PNA, morganella bacteremia and kelbsiella UTI.  - taper down midodrine  -c/w zosyn until 12/8  -ID on case  -repeat CT abdomen with PO no signs of infection  -repeat bcx ordered. need to followu  -ID to assist on IV abx duration    Acute respiratory failure due to septic shock due to Pseudo PNA  extubated on room air  diuresis held due to YUNIEL      YUNIEL due to Septic ATN  no plans for HD  cr at 6.21    constipation on CT  dulcolax and miralax    Dispo: eventual CARMEN for now needs monitoring for ATN and IV abx for psuedomonal PNA, morganella bacteremia and kelbsiella UTI.

## 2022-12-06 NOTE — PATIENT PROFILE ADULT - FUNCTIONAL ASSESSMENT - DAILY ACTIVITY SECTION LABEL
Vanda Ferrari was seen and treated in our emergency department on 12/6/2022. He may return to work on 12/13/2022. 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 If you have any questions or concerns, please don't hesitate to call.       Andrea Porras MD .

## 2022-12-06 NOTE — PROGRESS NOTE ADULT - ASSESSMENT
Possible necrotizing pneumonia as source of bacteremia.  Wasted appearance but no clear or convincing source of SSTI  No perforation noted related to stent or otherwise, though CT was without IV or enteral contrast  Sputum and urine discordant with blood isolate  Has groin HD cath, not present on admission  Concern that lung process may be polymicrobial abscess  I don't think we need to invoke TB here    11/29: WBC in about the same range, can't expect much change in WBC from a dose or two, FiO2 only 30%. Hopefully some moderation in YUNIEL. LFT improving as well.  11/30: Hypothermia, not a new phenomenon, but recurrent.  No follow-up culture data as of yet.  The setting of recurrent hypothermia, would repeat blood cultures here.  White blood cell count slight decline, platelet count remains low.  Creatinine unfortunately increased.  Liver function test trending down.  Still suspect that bacteremia related to pulmonary process, though FiO2 requirements at this juncture are not great.  12/1: Extiubated and not hypothermic, so some positives steps here. Cultures not obtained as suggested yesterday.  12/2: Overall stable to improved. WBC declined. Exam essentially unchanged.  12/5: downgraded from ICU, no fevers, RA, no leukocytosis, LFTs better, no new culture data, will repeat BCs, today is day 11 of all abx, Zosyn IV continued, awaiting for CT a/p.   12/6: no fevers, RA, no WBC, Cr increased, CT a/p with po contrast - constipation, chronic pancreatitis, probable gastritis. The pt has a bowel movement prior my exam and abd exam is benign. Today is day #12 of all abx, day #10 of Zosyn, repeat BCs were collected yesterday and pending result.     Suggestions  Continue Zosyn  Trend CBC  Follow-up creatinine  repeat CT a/p - reviewed   follow repeat BCs x 2 - pending   monitor pt's LFTs    will discuss with Dr. Cavazos  Possible necrotizing pneumonia as source of bacteremia.  Wasted appearance but no clear or convincing source of SSTI  No perforation noted related to stent or otherwise, though CT was without IV or enteral contrast  Sputum and urine discordant with blood isolate  Has groin HD cath, not present on admission  Concern that lung process may be polymicrobial abscess  I don't think we need to invoke TB here    11/29: WBC in about the same range, can't expect much change in WBC from a dose or two, FiO2 only 30%. Hopefully some moderation in YUNIEL. LFT improving as well.  11/30: Hypothermia, not a new phenomenon, but recurrent.  No follow-up culture data as of yet.  The setting of recurrent hypothermia, would repeat blood cultures here.  White blood cell count slight decline, platelet count remains low.  Creatinine unfortunately increased.  Liver function test trending down.  Still suspect that bacteremia related to pulmonary process, though FiO2 requirements at this juncture are not great.  12/1: Extiubated and not hypothermic, so some positives steps here. Cultures not obtained as suggested yesterday.  12/2: Overall stable to improved. WBC declined. Exam essentially unchanged.  12/5: downgraded from ICU, no fevers, RA, no leukocytosis, LFTs better, no new culture data, will repeat BCs, today is day 11 of all abx, Zosyn IV continued, awaiting for CT a/p.   12/6: no fevers, RA, no WBC, Cr increased, CT a/p with po contrast - constipation, chronic pancreatitis, probable gastritis. The pt has a bowel movement prior my exam and abd exam is benign. Today is day #12 of all abx, day #10 of Zosyn, repeat BCs were collected yesterday and pending result.   Prior CT chest with "New large cavitary consolidation in the right upper lobe, likely infectious in etiology. Differential diagnosis is cavitary mass" - needs further f/up.     Suggestions  Continue Zosyn  Trend CBC  Follow-up creatinine  repeat CT a/p - reviewed   please address prior CT chest findings - large cvitary consolidation in RUL, ? possible cavitary mass  follow repeat BCs x 2 - pending   monitor pt's LFTs    discussed with Dr. Cavazos

## 2022-12-06 NOTE — PROGRESS NOTE ADULT - SUBJECTIVE AND OBJECTIVE BOX
[facility] NEPHROLOGY SERVICES, [facility]  NEPHROLOGY AND HYPERTENSION  [address]  [address]  [place] [address]  [phone]    MD HALIE OLIVIA, MD CANDICE ARMSTRONG, MD DHRUV WHITEHEAD, MD ROGERIO ESPOSITO, MD SHERON JUAREZ, MD JANICE ARREOLA MD          Patient events noted  No distress    MEDICATIONS  (STANDING):  chlorhexidine 2% Cloths 1 Application(s) Topical <User Schedule>  ondansetron   Disintegrating Tablet 4 milliGRAM(s) Oral once  pantoprazole    Tablet 40 milliGRAM(s) Oral before breakfast  piperacillin/tazobactam IVPB.. 3.375 Gram(s) IV Intermittent every 12 hours  rifAXIMin 550 milliGRAM(s) Oral two times a day  sodium chloride 0.45%. 1000 milliLiter(s) (75 mL/Hr) IV Continuous <Continuous>    MEDICATIONS  (PRN):  calcium carbonate    500 mG (Tums) Chewable 1 Tablet(s) Chew every 6 hours PRN Upset Stomach  sodium chloride 0.9% lock flush 10 milliLiter(s) IV Push every 1 hour PRN Pre/post blood products, medications, blood draw, and to maintain line patency      12-05-22 @ 07:01  -  12-06-22 @ 07:00  --------------------------------------------------------  IN: 660 mL / OUT: 3 mL / NET: 657 mL    12-06-22 @ 07:01  -  12-07-22 @ 00:12  --------------------------------------------------------  IN: 240 mL / OUT: 2 mL / NET: 238 mL      PHYSICAL EXAM:      T(C): 37 (12-06-22 @ 16:30), Max: 37 (12-06-22 @ 16:30)  HR: 98 (12-06-22 @ 16:30) (77 - 98)  BP: 133/79 (12-06-22 @ 16:30) (124/74 - 150/85)  RR: 18 (12-06-22 @ 16:30) (18 - 19)  SpO2: 98% (12-06-22 @ 16:30) (97% - 100%)  Wt(kg): --  Lungs clear  Heart S1S2  Abd soft NT ND  Extremities:   tr edema                                    7.7    7.79  )-----------( 210      ( 06 Dec 2022 11:34 )             23.8     12-06    146<H>  |  114<H>  |  61<H>  ----------------------------<  313<H>  3.1<L>   |  20<L>  |  6.21<H>    Ca    7.1<L>      06 Dec 2022 11:34  Mg     1.7     12-05    TPro  5.3<L>  /  Alb  1.7<L>  /  TBili  0.3  /  DBili  x   /  AST  44<H>  /  ALT  168<H>  /  AlkPhos  470<H>  12-05      LIVER FUNCTIONS - ( 05 Dec 2022 06:40 )  Alb: 1.7 g/dL / Pro: 5.3 gm/dL / ALK PHOS: 470 U/L / ALT: 168 U/L / AST: 44 U/L / GGT: x           Creatinine Trend: 6.21<--, 5.84<--, 5.86<--, 5.21<--, 4.34<--, 4.01<--      Assessment   YUNIEL CKD 4; septic shock; ischemic ATN, non oliguric        Plan    Continue IVF  Follow respiratory status, UO and Cr trend, will decide on resuming HD support         Austin Dukes MD

## 2022-12-07 NOTE — PROGRESS NOTE ADULT - SUBJECTIVE AND OBJECTIVE BOX
Patient is a 62y old  Male who presents with a chief complaint of Unresponsiveness/FTT (06 Dec 2022 15:39)      INTERVAL HPI/OVERNIGHT EVENTS:  Pt was seen and examined, no acute events.      MEDICATIONS  (STANDING):  chlorhexidine 2% Cloths 1 Application(s) Topical <User Schedule>  dextrose 5%. 1000 milliLiter(s) (100 mL/Hr) IV Continuous <Continuous>  pantoprazole    Tablet 40 milliGRAM(s) Oral before breakfast  potassium chloride   Powder 40 milliEquivalent(s) Oral once  rifAXIMin 550 milliGRAM(s) Oral two times a day    MEDICATIONS  (PRN):  calcium carbonate    500 mG (Tums) Chewable 1 Tablet(s) Chew every 6 hours PRN Upset Stomach  sodium chloride 0.9% lock flush 10 milliLiter(s) IV Push every 1 hour PRN Pre/post blood products, medications, blood draw, and to maintain line patency      Allergies  Tylenol (Unknown)      Vital Signs Last 24 Hrs  T(C): 36.6 (07 Dec 2022 11:45), Max: 36.9 (07 Dec 2022 05:17)  T(F): 97.8 (07 Dec 2022 11:45), Max: 98.4 (07 Dec 2022 05:17)  HR: 99 (07 Dec 2022 14:50) (88 - 99)  BP: 143/82 (07 Dec 2022 14:50) (119/70 - 143/82)  BP(mean): --  RR: 17 (07 Dec 2022 11:45) (17 - 18)  SpO2: 100% (07 Dec 2022 14:50) (99% - 100%)    Parameters below as of 07 Dec 2022 14:50  Patient On (Oxygen Delivery Method): room air        PHYSICAL EXAM:  General: comfortable, no acute distress, well nourished  HEENT: Atraumatic, no LAD, trachea midline, PERRLA  Cardiovascular: normal s1s2, no murmurs, gallops or friction rubs  Pulmonary: clear to ausculation Bilaterally, no wheezing , rhonchi  Gastrointestinal: soft non tender non distended, no masses felt, no organomegaly  Musculoskeletal: no lower extremity edema, intact bilateral lower extremity pulses  Neurological: CN II-12 intact. No focal weakness  Psychiatrical: normal mood, cooperative  SKIN: no rash, lesions or ulcers      LABS:                        7.3    9.02  )-----------( 251      ( 07 Dec 2022 05:35 )             23.4     12-07    147<H>  |  116<H>  |  55<H>  ----------------------------<  86  3.3<L>   |  19<L>  |  5.88<H>    Ca    7.3<L>      07 Dec 2022 05:35    TPro  4.8<L>  /  Alb  x   /  TBili  x   /  DBili  x   /  AST  x   /  ALT  x   /  AlkPhos  x   12-07        CAPILLARY BLOOD GLUCOSE          Culture - Blood (collected 05 Dec 2022 17:25)  Source: .Blood Blood-Peripheral  Preliminary Report (07 Dec 2022 02:02):    No growth to date.    Culture - Blood (collected 05 Dec 2022 17:20)  Source: .Blood Blood-Peripheral  Preliminary Report (07 Dec 2022 02:02):    No growth to date.      RADIOLOGY & ADDITIONAL TESTS:    Imaging Personally Reviewed:  [ ] YES  [ ] NO    Consultant(s) Notes Reviewed:  [ ] YES  [ ] NO    Care Discussed with Consultants/Other Providers [ ] YES  [ ] NO

## 2022-12-07 NOTE — PROGRESS NOTE ADULT - SUBJECTIVE AND OBJECTIVE BOX
[facility] NEPHROLOGY SERVICES, [facility]  NEPHROLOGY AND HYPERTENSION  [address]  [address]  [place] [address]  [phone]    MD HALIE OLIVIA, MD DHRUV REMY, MD ROGERIO ESPOSITO, MD SHERON JUAREZ, MD JANICE ARREOLA MD          Patient events noted    MEDICATIONS  (STANDING):  chlorhexidine 2% Cloths 1 Application(s) Topical <User Schedule>  dextrose 5%. 1000 milliLiter(s) (100 mL/Hr) IV Continuous <Continuous>  pantoprazole    Tablet 40 milliGRAM(s) Oral before breakfast  piperacillin/tazobactam IVPB.. 3.375 Gram(s) IV Intermittent every 12 hours  rifAXIMin 550 milliGRAM(s) Oral two times a day    MEDICATIONS  (PRN):  calcium carbonate    500 mG (Tums) Chewable 1 Tablet(s) Chew every 6 hours PRN Upset Stomach  sodium chloride 0.9% lock flush 10 milliLiter(s) IV Push every 1 hour PRN Pre/post blood products, medications, blood draw, and to maintain line patency      12-06-22 @ 07:01  -  12-07-22 @ 07:00  --------------------------------------------------------  IN: 240 mL / OUT: 302 mL / NET: -62 mL    12-07-22 @ 07:01  -  12-07-22 @ 23:48  --------------------------------------------------------  IN: 960 mL / OUT: 0 mL / NET: 960 mL      PHYSICAL EXAM:      T(C): 36.7 (12-07-22 @ 16:30), Max: 36.9 (12-07-22 @ 05:17)  HR: 85 (12-07-22 @ 16:30) (85 - 99)  BP: 125/77 (12-07-22 @ 16:30) (119/70 - 143/82)  RR: 18 (12-07-22 @ 16:30) (17 - 18)  SpO2: 97% (12-07-22 @ 16:30) (97% - 100%)  Wt(kg): --  Lungs clear  Heart S1S2  Abd soft NT ND  Extremities:   tr edema                                    7.3    9.02  )-----------( 251      ( 07 Dec 2022 05:35 )             23.4     12-07    147<H>  |  116<H>  |  55<H>  ----------------------------<  86  3.3<L>   |  19<L>  |  5.88<H>    Ca    7.3<L>      07 Dec 2022 05:35    TPro  4.8<L>  /  Alb  x   /  TBili  x   /  DBili  x   /  AST  x   /  ALT  x   /  AlkPhos  x   12-07      LIVER FUNCTIONS - ( 07 Dec 2022 05:35 )  Alb: x     / Pro: 4.8 g/dL / ALK PHOS: x     / ALT: x     / AST: x     / GGT: x           Creatinine Trend: 5.88<--, 6.21<--, 5.84<--, 5.86<--, 5.21<--, 4.34<--        Assessment   YUNIEL CKD 4; septic shock; ischemic ATN, non oliguric        Plan    Continue IVF  Follow respiratory status, UO and Cr trend, will decide on resuming HD support         Austin Dukes MD

## 2022-12-07 NOTE — PROGRESS NOTE ADULT - ASSESSMENT
Possible necrotizing pneumonia as source of bacteremia.  Wasted appearance but no clear or convincing source of SSTI  No perforation noted related to stent or otherwise, though CT was without IV or enteral contrast  Sputum and urine discordant with blood isolate  Has groin HD cath, not present on admission  Concern that lung process may be polymicrobial abscess  I don't think we need to invoke TB here    11/29: WBC in about the same range, can't expect much change in WBC from a dose or two, FiO2 only 30%. Hopefully some moderation in YUNIEL. LFT improving as well.  11/30: Hypothermia, not a new phenomenon, but recurrent.  No follow-up culture data as of yet.  The setting of recurrent hypothermia, would repeat blood cultures here.  White blood cell count slight decline, platelet count remains low.  Creatinine unfortunately increased.  Liver function test trending down.  Still suspect that bacteremia related to pulmonary process, though FiO2 requirements at this juncture are not great.  12/1: Extiubated and not hypothermic, so some positives steps here. Cultures not obtained as suggested yesterday.  12/2: Overall stable to improved. WBC declined. Exam essentially unchanged.  12/5: downgraded from ICU, no fevers, RA, no leukocytosis, LFTs better, no new culture data, will repeat BCs, today is day 11 of all abx, Zosyn IV continued, awaiting for CT a/p.   12/6: no fevers, RA, no WBC, Cr increased, CT a/p with po contrast - constipation, chronic pancreatitis, probable gastritis. The pt has a bowel movement prior my exam and abd exam is benign. Today is day #12 of all abx, day #10 of Zosyn, repeat BCs were collected yesterday and pending result.   Prior CT chest with "New large cavitary consolidation in the right upper lobe, likely infectious in etiology. Differential diagnosis is cavitary mass" - needs further f/up.   12/7: Septicemia appears to be controlled.  Zosyn completed earlier today–fell off MedX.  Again continue to believe that the source of the Morganella septicemia was the lung abscess, which has radiographically improved with antibiotics.  There is no standard of care with respect to duration of antibiotics for lung abscess.  It is typically on the order of 3 to 4 weeks.  Serial imaging will be needed to assure stability.  He may not insert resolve entirely.  These infections are not infrequently polymicrobial, but given the sensitivity profile of the blood isolate, there are no drugs p.o. but have good anaerobic activity as well as activity versus this isolate.  I do not think that IV antibiotics long-term, given his overall clinical condition, has a favorable risk-benefit ratio.  I think that the risk of line infection is high–even more so that he potentially will need a IV access device for dialysis.  Pseudomonas recovered most recently from sputum data is discordant with the Gram stain.  Not convinced that it needs to be specifically treated at this juncture.  The only oral option would be a fluoroquinolone, and this class of drugs have their problems.  Doubling coverage with 2 medications, doubles the risk of adverse events.  Overall I think that the course of action here that has the best risk-benefit ratio is use of a single agent such as cefuroxime which will cover the blood isolate as well as typical respiratory pathogens.    Suggestions  Until we see where his renal function settles out, continue Zosyn.  Follow-up culture data  Trend liver function test, suspect all resolving shock liver  Patient will need serial imaging of his chest to resolution or stability of radiographic findings.    Branden Cavazos MD  Attending Physician  [facility]  Division of Infectious Diseases  [phone]

## 2022-12-07 NOTE — PROGRESS NOTE ADULT - ASSESSMENT
61 y/o M w/HCV, emphysema, and chronic pancreatitis admitted for altered mental status. Intubated for acute respiratory failure. Hypotension likely secondary to severe sepsis with septic shock likely secondary to psuedomonal PNA, morganella bacteremia and kelbsiella UTI. CT chest with new cavitary lung lesion (not present on scan 8/30/22) favor cavitary PNA over malignancy due to rapid development. YUNIEL on CKD likely ATN. NSTEMI. Acute liver injury, likely shock liver.  Hyperkalemia.     Septic Shock POA due to psuedomonal PNA, morganella bacteremia and kelbsiella UTI.  - taper down midodrine  -c/w zosyn until 12/8  -ID on board  -repeat CT abdomen with PO no signs of infection  -repeat bcx neg from 12/5  -Repeat CT scan : Interval decrease in size of the cavitary consolidation in the right upper lobe, which now measures 3.8 x 2.1 x 3.1 cm. Decreased amount of   air within the consolidation compared to 11/25/2022.    Acute respiratory failure due to septic shock due to Pseudo PNA  extubated on room air  diuresis held due to YUNIEL    YUNIEL due to Septic ATN  S/p HD X 3  in ICU  Cr 5.8, monitoring to resume HD  Continue IVF  Renal following    constipation on CT  dulcolax and miralax    Dispo: eventual [facility] when stable

## 2022-12-07 NOTE — PROGRESS NOTE ADULT - SUBJECTIVE AND OBJECTIVE BOX
[facility]  Division of Infectious Diseases  [phone]    Name: IMELDA ROBERTSON  Age: 62y  Gender: Male  MRN: [id number]    Interval History--  Notes reviewed. Seen earlier this AM.  Main complaint is that his breakfast has not come yet, wants peanut butter and jelly sandwiches, and wants to know for sandwich for lunch.  Denies any pain.  Does not have any fevers, chills, or rigors.  No other complaints at this juncture.    Past Medical History--  ETOH abuse    Pancreatitis    Hepatitis C    Gout    HTN (hypertension)    Chronic kidney disease (CKD)    H/O chronic pancreatitis    Gout    Alcohol abuse    No significant past surgical history    Gastric perforation    Gastric perforation        For details regarding the patient's social history, family history, and other miscellaneous elements, please refer the initial infectious diseases consultation and/or the admitting history and physical examination for this admission.    Allergies    Tylenol (Unknown)    Intolerances        Medications--  Antibiotics:  rifAXIMin 550 milliGRAM(s) Oral two times a day    Immunologic:    Other:  calcium carbonate    500 mG (Tums) Chewable PRN  chlorhexidine 2% Cloths  dextrose 5%.  pantoprazole    Tablet  potassium chloride   Powder  sodium chloride 0.9% lock flush PRN      Review of Systems--  A 10-point review of systems was obtained.   Review of systems otherwise negative except as previously noted.    Physical Examination--  Vital Signs: T(F): 98 (12-07-22 @ 16:30), Max: 98.4 (12-07-22 @ 05:17)  HR: 85 (12-07-22 @ 16:30)  BP: 125/77 (12-07-22 @ 16:30)  RR: 18 (12-07-22 @ 16:30)  SpO2: 97% (12-07-22 @ 16:30)  Wt(kg): --  General: Chronically ill-appearing Male in no acute distress.  HEENT: Bitemporal wasting. Anicteric. Conjunctiva pink and moist. Isabela grossly clear.  Neck: Not rigid. No sense of mass.  Nodes: None palpable.  Lungs: Diminished BS Bilaterally, no wheezes, no rhonchi   Heart: Regular rate and rhythm. No audible murmur   Abdomen: Bowel sounds present and normoactive. Soft. Nondistended. Nontender. No sense of mass. No organomegaly.  Extremities: No cyanosis or clubbing. 1+ edema.  Wasted   Skin: Warm. Dry. Good turgor. No rash. No vasculitic stigmata.  Psychiatric: Grossly appropriate mood and affect.        Laboratory Studies--  CBC                        7.3    9.02  )-----------( 251      ( 07 Dec 2022 05:35 )             23.4       Chemistries  12-07    147<H>  |  116<H>  |  55<H>  ----------------------------<  86  3.3<L>   |  19<L>  |  5.88<H>    Ca    7.3<L>      07 Dec 2022 05:35    TPro  4.8<L>  /  Alb  x   /  TBili  x   /  DBili  x   /  AST  x   /  ALT  x   /  AlkPhos  x   12-07      Culture Data    Culture - Blood (collected 05 Dec 2022 17:25)  Source: .Blood Blood-Peripheral  Preliminary Report (07 Dec 2022 02:02):    No growth to date.    Culture - Blood (collected 05 Dec 2022 17:20)  Source: .Blood Blood-Peripheral  Preliminary Report (07 Dec 2022 02:02):    No growth to date.    < from: CT Chest No Cont (12.07.22 @ 13:00) >    ACC: [id number] EXAM:  CT CHEST                          PROCEDURE DATE:  12/07/2022          INTERPRETATION:  CLINICAL INFORMATION: Cavitary lung lesion    COMPARISON: 8/30/2022, 11/25/2022.    CONTRAST/COMPLICATIONS:  IV Contrast: NONE  Oral Contrast: NONE  Complications: None reported at time of study completion    PROCEDURE:  CT of the Chest was performed.  Sagittal and coronal reformats were performed.    FINDINGS:    LUNGS AND AIRWAYS: Small amount of mucus within the distal trachea.    There is a 3.8 x 2.1 x 3.1 cm cavitary consolidation in the right upper   lobe (previously 4.5 x 3.3 x 4.7 cm), with decreased amount of air within   the lesion compared to prior. Mild paraseptal emphysema at the lung   apices.  PLEURA: Trace bilateral pleural effusions with associated compressive   atelectasis.  MEDIASTINUM AND LAKE: Proximal to mid esophagus is mildly distended with   air and fluid. There is a distal esophageal stent.  VESSELS: Within normal limits.  HEART: Heart size is normal. Nopericardial effusion.  CHEST WALL AND LOWER NECK: Within normal limits.  VISUALIZED UPPER ABDOMEN: Large amount of inspissated material within the   stomach. Large amount stool within the colon. Extensive pancreatic   calcifications compatible with chronic pancreatitis.  BONES: Within normal limits.    IMPRESSION:  Interval decrease in size of the cavitary consolidation in the right   upper lobe, which now measures 3.8 x 2.1 x 3.1 cm. Decreased amount of   air within the consolidation compared to11/25/2022.    Trace bilateral pleural effusions with associated atelectasis.    Additional findings as above.    --- End of Report ---            KURT SPAIN MD; Attending Radiologist  This document has been electronically signed. Dec  7 2022  2:18PM    < end of copied text >

## 2022-12-08 NOTE — PROGRESS NOTE ADULT - SUBJECTIVE AND OBJECTIVE BOX
IMELDA ROBERTSON  MRN-[id number]    Follow Up:  bacteremia, lung cavitary consolidation     Interval History: the pt was seen and examined earlier, no distress, just had his morning care, s/p bowel movement. The pt is afebrile, RA, no leukocytosis.     PAST MEDICAL & SURGICAL HISTORY:  ETOH abuse  sober x1 year approximately      Pancreatitis      Hepatitis C  treated      Gout      HTN (hypertension)      Chronic kidney disease (CKD)      H/O chronic pancreatitis      Gout      Alcohol abuse      Gastric perforation          ROS:    [ ] Unobtainable because:  [x ] All other systems negative    Constitutional: no fever, no chills  Head: no trauma  Eyes: no vision changes, no eye pain  ENT:  no sore throat, no rhinorrhea  Cardiovascular:  no chest pain, no palpitation  Respiratory:  no SOB, no cough  GI:  some abd pain, no vomiting, no diarrhea  urinary: no dysuria, no hematuria, no flank pain  musculoskeletal:  no joint pain, no joint swelling  skin:  no rash  neurology:  no headache, no seizure, no change in mental status  psych: no anxiety, no depression         Allergies  Tylenol (Unknown)        ANTIMICROBIALS:  piperacillin/tazobactam IVPB.. 3.375 every 12 hours  rifAXIMin 550 two times a day      OTHER MEDS:  calcium carbonate    500 mG (Tums) Chewable 1 Tablet(s) Chew every 6 hours PRN  chlorhexidine 2% Cloths 1 Application(s) Topical <User Schedule>  dextrose 5%. 1000 milliLiter(s) IV Continuous <Continuous>  pantoprazole    Tablet 40 milliGRAM(s) Oral before breakfast  sodium chloride 0.9% lock flush 10 milliLiter(s) IV Push every 1 hour PRN      Vital Signs Last 24 Hrs  T(C): 36.6 (08 Dec 2022 13:58), Max: 36.8 (08 Dec 2022 00:05)  T(F): 97.9 (08 Dec 2022 13:58), Max: 98.2 (08 Dec 2022 00:05)  HR: 95 (08 Dec 2022 11:05) (87 - 95)  BP: 146/83 (08 Dec 2022 11:05) (134/82 - 146/83)  BP(mean): --  RR: 19 (08 Dec 2022 11:05) (18 - 19)  SpO2: 100% (08 Dec 2022 11:05) (98% - 100%)    Parameters below as of 08 Dec 2022 11:05  Patient On (Oxygen Delivery Method): room air        Physical Exam:  General: Chronically ill-appearing Male in no acute distress.  HEENT: Bitemporal wasting. Anicteric. Conjunctiva pink and moist. Isabela grossly clear.  Neck: Not rigid. No sense of mass.  Nodes: None palpable.  Lungs: Diminished BS Bilaterally, no wheezes, no rhonchi   Heart: Regular rate and rhythm. No audible murmur   Abdomen: Bowel sounds present and normoactive. Soft. Nondistended. Nontender - no pain elicited with palpation. No sense of mass. No organomegaly.  Extremities: No cyanosis or clubbing. 1+ edema.  Wasted   Skin: Warm. Dry. Good turgor. No rash. No vasculitic stigmata.  Psychiatric: Grossly appropriate mood and affect.        WBC Count: 6.33 K/uL (12-08 @ 08:25)  WBC Count: 9.02 K/uL (12-07 @ 05:35)  WBC Count: 7.79 K/uL (12-06 @ 11:34)  WBC Count: 8.37 K/uL (12-05 @ 06:40)  WBC Count: 6.59 K/uL (12-04 @ 09:50)  WBC Count: 7.25 K/uL (12-03 @ 03:05)  WBC Count: 8.15 K/uL (12-02 @ 01:55)                            7.2    6.33  )-----------( 254      ( 08 Dec 2022 08:25 )             23.5       12-08    148<H>  |  118<H>  |  50<H>  ----------------------------<  87  3.3<L>   |  19<L>  |  6.00<H>    Ca    7.5<L>      08 Dec 2022 08:25    TPro  5.2<L>  /  Alb  1.7<L>  /  TBili  0.3  /  DBili  x   /  AST  24  /  ALT  110<H>  /  AlkPhos  354<H>  12-08          Creatinine Trend: 6.00<--, 5.88<--, 6.21<--, 5.84<--, 5.86<--, 5.21<--      MICROBIOLOGY:  v  .Blood Blood-Peripheral  12-05-22   No growth to date.  --  --      .Blood Blood-Peripheral  12-05-22   No growth to date.  --  --      ET Tube ET Tube  11-27-22   No growth at 1 week.  --  --      ET Tube ET Tube  11-26-22   Few Pseudomonas aeruginosa  Normal Respiratory Mariah present  --  Pseudomonas aeruginosa      ET Tube ET Tube  11-25-22   Normal Respiratory Mariah present  --    Few polymorphonuclear leukocytes per low power field  No Squamous epithelial cells per low power field  Rare Gram Negative Rods per oil power field  Rare Gram Positive Cocci in Pairs and Chains per oil power field      Clean Catch Clean Catch (Midstream)  11-25-22   >100,000 CFU/ml Klebsiella pneumoniae  --  Klebsiella pneumoniae      .Blood Blood-Peripheral  11-25-22   Growth in anaerobic bottle: Morganella morganii  See previous culture [id number]  --  Blood Culture PCR  Morganella morganii      Ferritin, Serum: 264 (12-08)      D-Dimer Assay, Quantitative: 3160 (11-30)        SARS-CoV-2: NotDetec (25 Nov 2022 01:46)  COVID-19 PCR: NotDetec (19 Jul 2022 05:41)  COVID-19 PCR: NotDetec (13 Jul 2022 07:03)  SARS-CoV-2: NotDetec (06 Jul 2022 15:07)    RADIOLOGY:

## 2022-12-08 NOTE — PROGRESS NOTE ADULT - SUBJECTIVE AND OBJECTIVE BOX
Patient is a 62y old  Male who presents with a chief complaint of Unresponsiveness/FTT (08 Dec 2022 17:06)      INTERVAL HPI/OVERNIGHT EVENTS:  Pt was seen and examined, no acute events.      MEDICATIONS  (STANDING):  chlorhexidine 2% Cloths 1 Application(s) Topical <User Schedule>  pantoprazole    Tablet 40 milliGRAM(s) Oral before breakfast  piperacillin/tazobactam IVPB.. 3.375 Gram(s) IV Intermittent every 12 hours  rifAXIMin 550 milliGRAM(s) Oral two times a day    MEDICATIONS  (PRN):  calcium carbonate    500 mG (Tums) Chewable 1 Tablet(s) Chew every 6 hours PRN Upset Stomach  sodium chloride 0.9% lock flush 10 milliLiter(s) IV Push every 1 hour PRN Pre/post blood products, medications, blood draw, and to maintain line patency      Allergies  Tylenol (Unknown)        Vital Signs Last 24 Hrs  T(C): 36.8 (08 Dec 2022 16:40), Max: 36.8 (08 Dec 2022 00:05)  T(F): 98.3 (08 Dec 2022 16:40), Max: 98.3 (08 Dec 2022 16:40)  HR: 95 (08 Dec 2022 16:40) (87 - 95)  BP: 137/76 (08 Dec 2022 16:40) (134/82 - 146/83)  BP(mean): --  RR: 18 (08 Dec 2022 16:40) (18 - 19)  SpO2: 100% (08 Dec 2022 16:40) (98% - 100%)    Parameters below as of 08 Dec 2022 16:40  Patient On (Oxygen Delivery Method): room air        PHYSICAL EXAM:  General: comfortable, no acute distress, well nourished  HEENT: Atraumatic, no LAD, trachea midline, PERRLA  Cardiovascular: normal s1s2, no murmurs, gallops or friction rubs  Pulmonary: clear to ausculation Bilaterally, no wheezing , rhonchi  Gastrointestinal: soft non tender non distended, no masses felt, no organomegaly  Musculoskeletal: no lower extremity edema, intact bilateral lower extremity pulses  Neurological: CN II-12 intact. No focal weakness  Psychiatrical: normal mood, cooperative  SKIN: no rash, lesions or ulcers        LABS:                        7.2    6.33  )-----------( 254      ( 08 Dec 2022 08:25 )             23.5     12-08    148<H>  |  118<H>  |  50<H>  ----------------------------<  87  3.3<L>   |  19<L>  |  6.00<H>    Ca    7.5<L>      08 Dec 2022 08:25    TPro  5.2<L>  /  Alb  1.7<L>  /  TBili  0.3  /  DBili  x   /  AST  24  /  ALT  110<H>  /  AlkPhos  354<H>  12-08        CAPILLARY BLOOD GLUCOSE          Culture - Blood (collected 05 Dec 2022 17:25)  Source: .Blood Blood-Peripheral  Preliminary Report (07 Dec 2022 02:02):    No growth to date.    Culture - Blood (collected 05 Dec 2022 17:20)  Source: .Blood Blood-Peripheral  Preliminary Report (07 Dec 2022 02:02):    No growth to date.      RADIOLOGY & ADDITIONAL TESTS:    Imaging Personally Reviewed:  [ ] YES  [ ] NO    Consultant(s) Notes Reviewed:  [ ] YES  [ ] NO    Care Discussed with Consultants/Other Providers [ ] YES  [ ] NO

## 2022-12-08 NOTE — PROGRESS NOTE ADULT - ASSESSMENT
61 y/o M w/HCV, emphysema, and chronic pancreatitis admitted for altered mental status. Intubated for acute respiratory failure. Hypotension likely secondary to severe sepsis with septic shock likely secondary to psuedomonal PNA, morganella bacteremia and kelbsiella UTI. CT chest with new cavitary lung lesion (not present on scan 8/30/22) favor cavitary PNA over malignancy due to rapid development. YUNIEL on CKD likely ATN. NSTEMI. Acute liver injury, likely shock liver.  Hyperkalemia.     Septic Shock POA due to psuedomonal PNA, Lung abscess, morganella bacteremia and kelbsiella UTI.  -Off midodrine   zosyn resumed per ID, Plan to continue for now, duration TBD  -ID on board  -repeat CT abdomen with PO no signs of infection  -repeat bcx neg from 12/5  -Repeat CT scan : Interval decrease in size of the cavitary consolidation in the right upper lobe, which now measures 3.8 x 2.1 x 3.1 cm. Decreased amount of   air within the consolidation compared to 11/25/2022.    Acute respiratory failure due to septic shock due to Pseudo PNA  extubated, on room air  diuresis held due to YUNIEL    YUNIEL due to Septic ATN  S/p HD X 3  in ICU  Cr 6, cr plateaued    IVF stopped  Renal following    constipation on CT  dulcolax and miralax    Dispo: eventual [facility] when stable

## 2022-12-08 NOTE — PROGRESS NOTE ADULT - SUBJECTIVE AND OBJECTIVE BOX
[facility] NEPHROLOGY SERVICES, [facility]  NEPHROLOGY AND HYPERTENSION  [address]  [address]  [place] [address]  [phone]    MD HALIE OLIVIA, MD DHRUV REMY, MD ROGERIO ESPOSITO, MD SHERON JUAREZ, MD JANICE ARREOLA MD          Patient events noted  No distress      MEDICATIONS  (STANDING):  chlorhexidine 2% Cloths 1 Application(s) Topical <User Schedule>  dextrose 5%. 1000 milliLiter(s) (100 mL/Hr) IV Continuous <Continuous>  pantoprazole    Tablet 40 milliGRAM(s) Oral before breakfast  piperacillin/tazobactam IVPB.. 3.375 Gram(s) IV Intermittent every 12 hours  rifAXIMin 550 milliGRAM(s) Oral two times a day    MEDICATIONS  (PRN):  calcium carbonate    500 mG (Tums) Chewable 1 Tablet(s) Chew every 6 hours PRN Upset Stomach  sodium chloride 0.9% lock flush 10 milliLiter(s) IV Push every 1 hour PRN Pre/post blood products, medications, blood draw, and to maintain line patency      12-07-22 @ 07:01  -  12-08-22 @ 07:00  --------------------------------------------------------  IN: 2260 mL / OUT: 0 mL / NET: 2260 mL    12-08-22 @ 07:01  -  12-08-22 @ 17:06  --------------------------------------------------------  IN: 700 mL / OUT: 0 mL / NET: 700 mL      PHYSICAL EXAM:      T(C): 36.8 (12-08-22 @ 16:40), Max: 36.8 (12-08-22 @ 00:05)  HR: 95 (12-08-22 @ 16:40) (87 - 95)  BP: 137/76 (12-08-22 @ 16:40) (134/82 - 146/83)  RR: 18 (12-08-22 @ 16:40) (18 - 19)  SpO2: 100% (12-08-22 @ 16:40) (98% - 100%)  Wt(kg): --  Lungs clear  Heart S1S2  Abd soft NT ND  Extremities:   tr edema                                    7.2    6.33  )-----------( 254      ( 08 Dec 2022 08:25 )             23.5     12-08    148<H>  |  118<H>  |  50<H>  ----------------------------<  87  3.3<L>   |  19<L>  |  6.00<H>    Ca    7.5<L>      08 Dec 2022 08:25    TPro  5.2<L>  /  Alb  1.7<L>  /  TBili  0.3  /  DBili  x   /  AST  24  /  ALT  110<H>  /  AlkPhos  354<H>  12-08      LIVER FUNCTIONS - ( 08 Dec 2022 08:25 )  Alb: 1.7 g/dL / Pro: 5.2 gm/dL / ALK PHOS: 354 U/L / ALT: 110 U/L / AST: 24 U/L / GGT: x           Creatinine Trend: 6.00<--, 5.88<--, 6.21<--, 5.84<--, 5.86<--, 5.21<--        Assessment   YUNIEL CKD 4; septic shock; ischemic ATN, non oliguric  Indices stable 5.8-6       Plan    Continue IVF for 24 hrs  No indications for HD; will require close outpatient follow up          Austin Dukes MD

## 2022-12-08 NOTE — PROGRESS NOTE ADULT - ASSESSMENT
Possible necrotizing pneumonia as source of bacteremia.  Wasted appearance but no clear or convincing source of SSTI  No perforation noted related to stent or otherwise, though CT was without IV or enteral contrast  Sputum and urine discordant with blood isolate  Has groin HD cath, not present on admission  Concern that lung process may be polymicrobial abscess  I don't think we need to invoke TB here    11/29: WBC in about the same range, can't expect much change in WBC from a dose or two, FiO2 only 30%. Hopefully some moderation in YUNIEL. LFT improving as well.  11/30: Hypothermia, not a new phenomenon, but recurrent.  No follow-up culture data as of yet.  The setting of recurrent hypothermia, would repeat blood cultures here.  White blood cell count slight decline, platelet count remains low.  Creatinine unfortunately increased.  Liver function test trending down.  Still suspect that bacteremia related to pulmonary process, though FiO2 requirements at this juncture are not great.  12/1: Extiubated and not hypothermic, so some positives steps here. Cultures not obtained as suggested yesterday.  12/2: Overall stable to improved. WBC declined. Exam essentially unchanged.  12/5: downgraded from ICU, no fevers, RA, no leukocytosis, LFTs better, no new culture data, will repeat BCs, today is day 11 of all abx, Zosyn IV continued, awaiting for CT a/p.   12/6: no fevers, RA, no WBC, Cr increased, CT a/p with po contrast - constipation, chronic pancreatitis, probable gastritis. The pt has a bowel movement prior my exam and abd exam is benign. Today is day #12 of all abx, day #10 of Zosyn, repeat BCs were collected yesterday and pending result.   Prior CT chest with "New large cavitary consolidation in the right upper lobe, likely infectious in etiology. Differential diagnosis is cavitary mass" - needs further f/up.   12/7: Septicemia appears to be controlled.  Zosyn completed earlier today–fell off MedX.  Again continue to believe that the source of the Morganella septicemia was the lung abscess, which has radiographically improved with antibiotics.  There is no standard of care with respect to duration of antibiotics for lung abscess.  It is typically on the order of 3 to 4 weeks.  Serial imaging will be needed to assure stability.  He may not insert resolve entirely.  These infections are not infrequently polymicrobial, but given the sensitivity profile of the blood isolate, there are no drugs p.o. but have good anaerobic activity as well as activity versus this isolate.  I do not think that IV antibiotics long-term, given his overall clinical condition, has a favorable risk-benefit ratio.  I think that the risk of line infection is high–even more so that he potentially will need a IV access device for dialysis.  Pseudomonas recovered most recently from sputum data is discordant with the Gram stain.  Not convinced that it needs to be specifically treated at this juncture.  The only oral option would be a fluoroquinolone, and this class of drugs have their problems.  Doubling coverage with 2 medications, doubles the risk of adverse events.  Overall I think that the course of action here that has the best risk-benefit ratio is use of a single agent such as cefuroxime which will cover the blood isolate as well as typical respiratory pathogens.  12/8: no fever, RA, no WBC, Cr 6.0, repeat BCs with no growth to date. As per attending, the pt will possibly need serial imaging of the chest. Zosyn IV continued for now.     Suggestions  Until we see where his renal function settles out, continue Zosyn.  Follow-up culture data - repeat BCs with no growth to date  Trend liver function test, suspect all resolving shock liver - improving   Patient will need serial imaging of his chest to resolution or stability of radiographic findings.

## 2022-12-09 NOTE — PROGRESS NOTE ADULT - ASSESSMENT
63 y/o M w/HCV, emphysema, and chronic pancreatitis admitted for altered mental status. Intubated for acute respiratory failure. Hypotension likely secondary to severe sepsis with septic shock likely secondary to psuedomonal PNA, morganella bacteremia and kelbsiella UTI. CT chest with new cavitary lung lesion (not present on scan 8/30/22) favor cavitary PNA over malignancy due to rapid development. YUNIEL on CKD likely ATN. NSTEMI. Acute liver injury, likely shock liver.  Hyperkalemia.     Septic Shock POA due to psuedomonal PNA, Lung abscess, morganella bacteremia and kelbsiella UTI.  -Off midodrine  -Repeat CT scan : Interval decrease in size of the cavitary consolidation in the right upper lobe, which now measures 3.8 x 2.1 x 3.1 cm. Decreased amount of   air within the consolidation compared to 11/25/2022.  -Zosyn resumed per ID, Plan to continue for now, possibly can switch to Ceftin X 1 month , discussed with ID  -ID on board  -repeat CT abdomen with PO no signs of infection  -repeat bcx neg from 12/5    Acute respiratory failure due to septic shock due to Pseudo PNA  extubated, on room air  diuresis held due to YUNIEL    YUNIEL due to Septic ATN  S/p HD X 3  in ICU  Cr plateaued , hold off HD now  On bicarb drip  Renal following  Avoid nephrotoxic, no contrast , no NSAID    Hypokalemia:  - K replaced     Nausea/ vomiting:  - CT A/P on 12/5:  1.  Esophageal stent in place which is partially filled with debris/ingested material.  2.  The stomach is collapsed. There is gastric wall thickening with submucosal edema. Probable gastritis.  3.  Chronic calcific pancreatitis.  4.  Large amount of stool throughout the colon consistent with obstipation/constipation.  dulcolax and Miralax  - Will ask GI to assess    Dispo: eventual CARMEN when stable

## 2022-12-09 NOTE — PROGRESS NOTE ADULT - SUBJECTIVE AND OBJECTIVE BOX
[facility] NEPHROLOGY SERVICES, [facility]  NEPHROLOGY AND HYPERTENSION  [address]  [address]  [place] [address]  [phone]    MD HALIE OLIVIA, MD DHRUV REMY, MD ROGERIO ESPOSITO, MD SHERON JUAREZ, MD JANICE ARREOLA MD          Patient events noted    MEDICATIONS  (STANDING):  chlorhexidine 2% Cloths 1 Application(s) Topical <User Schedule>  pantoprazole    Tablet 40 milliGRAM(s) Oral before breakfast  piperacillin/tazobactam IVPB.. 3.375 Gram(s) IV Intermittent every 12 hours  rifAXIMin 550 milliGRAM(s) Oral two times a day    MEDICATIONS  (PRN):  calcium carbonate    500 mG (Tums) Chewable 1 Tablet(s) Chew every 6 hours PRN Upset Stomach  sodium chloride 0.9% lock flush 10 milliLiter(s) IV Push every 1 hour PRN Pre/post blood products, medications, blood draw, and to maintain line patency      12-08-22 @ 07:01  -  12-09-22 @ 07:00  --------------------------------------------------------  IN: 700 mL / OUT: 0 mL / NET: 700 mL    12-09-22 @ 07:01  -  12-10-22 @ 00:01  --------------------------------------------------------  IN: 760 mL / OUT: 0 mL / NET: 760 mL      PHYSICAL EXAM:      T(C): 37 (12-09-22 @ 16:00), Max: 37.1 (12-09-22 @ 00:03)  HR: 101 (12-09-22 @ 16:00) (73 - 101)  BP: 148/- (12-09-22 @ 16:00) (113/76 - 149/82)  RR: 19 (12-09-22 @ 16:00) (18 - 19)  SpO2: 100% (12-09-22 @ 16:00) (97% - 100%)  Wt(kg): --  Lungs clear  Heart S1S2  Abd soft NT ND  Extremities:   tr edema                                    7.1    4.31  )-----------( 256      ( 09 Dec 2022 07:36 )             22.8     12-09    144  |  114<H>  |  49<H>  ----------------------------<  83  2.9<LL>   |  18<L>  |  5.85<H>    Ca    7.2<L>      09 Dec 2022 07:36    TPro  4.9<L>  /  Alb  1.6<L>  /  TBili  0.2  /  DBili  x   /  AST  20  /  ALT  92<H>  /  AlkPhos  321<H>  12-09      LIVER FUNCTIONS - ( 09 Dec 2022 07:36 )  Alb: 1.6 g/dL / Pro: 4.9 gm/dL / ALK PHOS: 321 U/L / ALT: 92 U/L / AST: 20 U/L / GGT: x           Creatinine Trend: 5.85<--, 6.00<--, 5.88<--, 6.21<--, 5.84<--, 5.86<--      Assessment   YUNIEL CKD 4; septic shock; ischemic ATN, non oliguric  Indices stable 5.8-6       Plan    Continue IVF with bicarbonate support  PO KCL  Retacrit and IV iron  No indications for HD; will require close outpatient follow up    Austin Dukes MD

## 2022-12-09 NOTE — PROGRESS NOTE ADULT - ASSESSMENT
Possible necrotizing pneumonia as source of bacteremia.  Wasted appearance but no clear or convincing source of SSTI  No perforation noted related to stent or otherwise, though CT was without IV or enteral contrast  Sputum and urine discordant with blood isolate  Has groin HD cath, not present on admission  Concern that lung process may be polymicrobial abscess  I don't think we need to invoke TB here    11/29: WBC in about the same range, can't expect much change in WBC from a dose or two, FiO2 only 30%. Hopefully some moderation in YUNIEL. LFT improving as well.  11/30: Hypothermia, not a new phenomenon, but recurrent.  No follow-up culture data as of yet.  The setting of recurrent hypothermia, would repeat blood cultures here.  White blood cell count slight decline, platelet count remains low.  Creatinine unfortunately increased.  Liver function test trending down.  Still suspect that bacteremia related to pulmonary process, though FiO2 requirements at this juncture are not great.  12/1: Extiubated and not hypothermic, so some positives steps here. Cultures not obtained as suggested yesterday.  12/2: Overall stable to improved. WBC declined. Exam essentially unchanged.  12/5: downgraded from ICU, no fevers, RA, no leukocytosis, LFTs better, no new culture data, will repeat BCs, today is day 11 of all abx, Zosyn IV continued, awaiting for CT a/p.   12/6: no fevers, RA, no WBC, Cr increased, CT a/p with po contrast - constipation, chronic pancreatitis, probable gastritis. The pt has a bowel movement prior my exam and abd exam is benign. Today is day #12 of all abx, day #10 of Zosyn, repeat BCs were collected yesterday and pending result.   Prior CT chest with "New large cavitary consolidation in the right upper lobe, likely infectious in etiology. Differential diagnosis is cavitary mass" - needs further f/up.   12/7: Septicemia appears to be controlled.  Zosyn completed earlier today–fell off MedX.  Again continue to believe that the source of the Morganella septicemia was the lung abscess, which has radiographically improved with antibiotics.  There is no standard of care with respect to duration of antibiotics for lung abscess.  It is typically on the order of 3 to 4 weeks.  Serial imaging will be needed to assure stability.  He may not insert resolve entirely.  These infections are not infrequently polymicrobial, but given the sensitivity profile of the blood isolate, there are no drugs p.o. but have good anaerobic activity as well as activity versus this isolate.  I do not think that IV antibiotics long-term, given his overall clinical condition, has a favorable risk-benefit ratio.  I think that the risk of line infection is high–even more so that he potentially will need a IV access device for dialysis.  Pseudomonas recovered most recently from sputum data is discordant with the Gram stain.  Not convinced that it needs to be specifically treated at this juncture.  The only oral option would be a fluoroquinolone, and this class of drugs have their problems.  Doubling coverage with 2 medications, doubles the risk of adverse events.  Overall I think that the course of action here that has the best risk-benefit ratio is use of a single agent such as cefuroxime which will cover the blood isolate as well as typical respiratory pathogens.  12/8: no fever, RA, no WBC, Cr 6.0, repeat BCs with no growth to date. As per attending, the pt will possibly need serial imaging of the chest. Zosyn IV continued for now.   12/9: remains afebrile, no leukocytosis, anemic with mild abd pain on today's exam, repeat BCs with no growth to date, will continue with Zosyn for now. If abdominal pain is improving and no new findings, ok to change abx to po Ceftin upon discharge - the pt will need four more weeks of abx and repeat CT chest to evaluate resolution or improvement of pt's chest findings.     Suggestions  continue Zosyn for now  abx plan as above   Follow-up culture data - repeat BCs with no growth to date  Trend liver function test  Patient will need serial imaging of his chest to resolution or stability of radiographic findings and four more weeks of abx    Discussed with Dr. Cavazos  Discussed with Dr. Ty

## 2022-12-09 NOTE — PROGRESS NOTE ADULT - SUBJECTIVE AND OBJECTIVE BOX
IMELDA ROBERTSON  MRN-[id number]    Follow Up:  Bacteremia    Interval History: the pt was seen and examined earlier, no distress, in his bed, s/p morning care, complains of abd pain. The pt is afebrile, RA, no leukocytosis, anemic.     PAST MEDICAL & SURGICAL HISTORY:  ETOH abuse  sober x1 year approximately      Pancreatitis      Hepatitis C  treated      Gout      HTN (hypertension)      Chronic kidney disease (CKD)      H/O chronic pancreatitis      Gout      Alcohol abuse      Gastric perforation          ROS:    [ ] Unobtainable because:  [x ] All other systems negative    Constitutional: no fever, no chills  Head: no trauma  Eyes: no vision changes, no eye pain  ENT:  no sore throat, no rhinorrhea  Cardiovascular:  no chest pain, no palpitation  Respiratory:  no SOB, no cough  GI:  + abd pain, no vomiting, no diarrhea  urinary: no dysuria, no hematuria, no flank pain  musculoskeletal:  no joint pain, no joint swelling  skin:  no rash  neurology:  no headache, no seizure, no change in mental status  psych: no anxiety, no depression         Allergies  Tylenol (Unknown)        ANTIMICROBIALS:  piperacillin/tazobactam IVPB.. 3.375 every 12 hours  rifAXIMin 550 two times a day      OTHER MEDS:  calcium carbonate    500 mG (Tums) Chewable 1 Tablet(s) Chew every 6 hours PRN  chlorhexidine 2% Cloths 1 Application(s) Topical <User Schedule>  pantoprazole    Tablet 40 milliGRAM(s) Oral before breakfast  sodium chloride 0.9% lock flush 10 milliLiter(s) IV Push every 1 hour PRN      Vital Signs Last 24 Hrs  T(C): 37 (09 Dec 2022 11:05), Max: 37.1 (09 Dec 2022 00:03)  T(F): 98.6 (09 Dec 2022 11:05), Max: 98.7 (09 Dec 2022 00:03)  HR: 93 (09 Dec 2022 11:05) (73 - 94)  BP: 113/76 (09 Dec 2022 11:05) (113/76 - 149/82)  BP(mean): --  RR: 19 (09 Dec 2022 11:05) (18 - 19)  SpO2: 97% (09 Dec 2022 11:05) (97% - 100%)    Parameters below as of 09 Dec 2022 11:05  Patient On (Oxygen Delivery Method): room air        Physical Exam:  General: Chronically ill-appearing Male in no acute distress.  HEENT: Bitemporal wasting. Anicteric. Conjunctiva pink and moist. Isabela grossly clear.  Neck: Not rigid. No sense of mass.  Nodes: None palpable.  Lungs: Diminished BS Bilaterally, no wheezes, no rhonchi   Heart: Regular rate and rhythm. No audible murmur   Abdomen: Bowel sounds present and normoactive. Soft. Nondistended. seems tender on today's exam. No sense of mass. No organomegaly.  Extremities: No cyanosis or clubbing. 1+ edema.  Wasted   Skin: Warm. Dry. Good turgor. No rash. No vasculitic stigmata.  Psychiatric: Grossly appropriate mood and affect.    WBC Count: 4.31 K/uL (12-09 @ 07:36)  WBC Count: 6.33 K/uL (12-08 @ 08:25)  WBC Count: 9.02 K/uL (12-07 @ 05:35)  WBC Count: 7.79 K/uL (12-06 @ 11:34)  WBC Count: 8.37 K/uL (12-05 @ 06:40)  WBC Count: 6.59 K/uL (12-04 @ 09:50)  WBC Count: 7.25 K/uL (12-03 @ 03:05)                            7.1    4.31  )-----------( 256      ( 09 Dec 2022 07:36 )             22.8       12-09    144  |  114<H>  |  49<H>  ----------------------------<  83  2.9<LL>   |  18<L>  |  5.85<H>    Ca    7.2<L>      09 Dec 2022 07:36    TPro  4.9<L>  /  Alb  1.6<L>  /  TBili  0.2  /  DBili  x   /  AST  20  /  ALT  92<H>  /  AlkPhos  321<H>  12-09          Creatinine Trend: 5.85<--, 6.00<--, 5.88<--, 6.21<--, 5.84<--, 5.86<--      MICROBIOLOGY:  v  .Blood Blood-Peripheral  12-05-22   No growth to date.  --  --      .Blood Blood-Peripheral  12-05-22   No growth to date.  --  --      ET Tube ET Tube  11-27-22   No growth at 1 week.  --  --      ET Tube ET Tube  11-26-22   Few Pseudomonas aeruginosa  Normal Respiratory Mariah present  --  Pseudomonas aeruginosa      ET Tube ET Tube  11-25-22   Normal Respiratory Mariah present  --    Few polymorphonuclear leukocytes per low power field  No Squamous epithelial cells per low power field  Rare Gram Negative Rods per oil power field  Rare Gram Positive Cocci in Pairs and Chains per oil power field      Clean Catch Clean Catch (Midstream)  11-25-22   >100,000 CFU/ml Klebsiella pneumoniae  --  Klebsiella pneumoniae      .Blood Blood-Peripheral  11-25-22   Growth in anaerobic bottle: Morganella morganii  See previous culture [id number]  --  Blood Culture PCR  Morganella morganii      Ferritin, Serum: 264 (12-08)      D-Dimer Assay, Quantitative: 3160 (11-30)        SARS-CoV-2: NotDetec (25 Nov 2022 01:46)  COVID-19 PCR: NotDetec (19 Jul 2022 05:41)  COVID-19 PCR: NotDetec (13 Jul 2022 07:03)  SARS-CoV-2: NotDetec (06 Jul 2022 15:07)    RADIOLOGY:

## 2022-12-09 NOTE — PROVIDER CONTACT NOTE (CRITICAL VALUE NOTIFICATION) - NAME OF MD/NP/PA/DO NOTIFIED:
Cathy RIVAS
Dr Piedra
Elie GUZMAN NP
ROB Carbone
Cynthia. np
harry layton
Bettie, NP
Dr Piedra
Prudencio RIVAS

## 2022-12-09 NOTE — PROGRESS NOTE ADULT - SUBJECTIVE AND OBJECTIVE BOX
Patient is a 62y old  Male who presents with a chief complaint of Unresponsiveness/FTT (08 Dec 2022 22:15)      INTERVAL HPI/OVERNIGHT EVENTS:  Pt was seen and examined, no acute events.      MEDICATIONS  (STANDING):  chlorhexidine 2% Cloths 1 Application(s) Topical <User Schedule>  pantoprazole    Tablet 40 milliGRAM(s) Oral before breakfast  piperacillin/tazobactam IVPB.. 3.375 Gram(s) IV Intermittent every 12 hours  rifAXIMin 550 milliGRAM(s) Oral two times a day  sodium chloride 0.9%. 1000 milliLiter(s) (75 mL/Hr) IV Continuous <Continuous>    MEDICATIONS  (PRN):  calcium carbonate    500 mG (Tums) Chewable 1 Tablet(s) Chew every 6 hours PRN Upset Stomach  sodium chloride 0.9% lock flush 10 milliLiter(s) IV Push every 1 hour PRN Pre/post blood products, medications, blood draw, and to maintain line patency      Allergies  Tylenol (Unknown)        Vital Signs Last 24 Hrs  T(C): 37 (09 Dec 2022 11:05), Max: 37.1 (09 Dec 2022 00:03)  T(F): 98.6 (09 Dec 2022 11:05), Max: 98.7 (09 Dec 2022 00:03)  HR: 93 (09 Dec 2022 11:05) (73 - 95)  BP: 113/76 (09 Dec 2022 11:05) (113/76 - 149/82)  BP(mean): --  RR: 19 (09 Dec 2022 11:05) (18 - 19)  SpO2: 97% (09 Dec 2022 11:05) (97% - 100%)    Parameters below as of 09 Dec 2022 11:05  Patient On (Oxygen Delivery Method): room air        PHYSICAL EXAM:  GENERAL:   HEAD:    EYES:   ENMT:   NECK:   NERVOUS SYSTEM:    CHEST/LUNG:   HEART:   ABDOMEN:   EXTREMITIES:    LYMPH:   SKIN:         LABS:                        7.1    4.31  )-----------( 256      ( 09 Dec 2022 07:36 )             22.8     12-09    144  |  114<H>  |  49<H>  ----------------------------<  83  2.9<LL>   |  18<L>  |  5.85<H>    Ca    7.2<L>      09 Dec 2022 07:36    TPro  4.9<L>  /  Alb  1.6<L>  /  TBili  0.2  /  DBili  x   /  AST  20  /  ALT  92<H>  /  AlkPhos  321<H>  12-09      CAPILLARY BLOOD GLUCOSE        Culture - Blood (collected 05 Dec 2022 17:25)  Source: .Blood Blood-Peripheral  Preliminary Report (07 Dec 2022 02:02):    No growth to date.    Culture - Blood (collected 05 Dec 2022 17:20)  Source: .Blood Blood-Peripheral  Preliminary Report (07 Dec 2022 02:02):    No growth to date.      RADIOLOGY & ADDITIONAL TESTS:    Imaging Personally Reviewed:  [ ] YES  [ ] NO    Consultant(s) Notes Reviewed:  [ ] YES  [ ] NO    Care Discussed with Consultants/Other Providers [ ] YES  [ ] NO Patient is a 62y old  Male who presents with a chief complaint of Unresponsiveness/FTT (08 Dec 2022 22:15)      INTERVAL HPI/OVERNIGHT EVENTS:  Pt was seen and examined, no acute events.      MEDICATIONS  (STANDING):  chlorhexidine 2% Cloths 1 Application(s) Topical <User Schedule>  pantoprazole    Tablet 40 milliGRAM(s) Oral before breakfast  piperacillin/tazobactam IVPB.. 3.375 Gram(s) IV Intermittent every 12 hours  rifAXIMin 550 milliGRAM(s) Oral two times a day  sodium chloride 0.9%. 1000 milliLiter(s) (75 mL/Hr) IV Continuous <Continuous>    MEDICATIONS  (PRN):  calcium carbonate    500 mG (Tums) Chewable 1 Tablet(s) Chew every 6 hours PRN Upset Stomach  sodium chloride 0.9% lock flush 10 milliLiter(s) IV Push every 1 hour PRN Pre/post blood products, medications, blood draw, and to maintain line patency      Allergies  Tylenol (Unknown)        Vital Signs Last 24 Hrs  T(C): 37 (09 Dec 2022 11:05), Max: 37.1 (09 Dec 2022 00:03)  T(F): 98.6 (09 Dec 2022 11:05), Max: 98.7 (09 Dec 2022 00:03)  HR: 93 (09 Dec 2022 11:05) (73 - 95)  BP: 113/76 (09 Dec 2022 11:05) (113/76 - 149/82)  BP(mean): --  RR: 19 (09 Dec 2022 11:05) (18 - 19)  SpO2: 97% (09 Dec 2022 11:05) (97% - 100%)    Parameters below as of 09 Dec 2022 11:05  Patient On (Oxygen Delivery Method): room air        PHYSICAL EXAM:  General: comfortable, no acute distress, well nourished  HEENT: Atraumatic, no LAD, trachea midline, PERRLA  Cardiovascular: normal s1s2, no murmurs, gallops or friction rubs  Pulmonary: clear to ausculation Bilaterally, no wheezing , rhonchi  Gastrointestinal: soft non tender non distended, no masses felt, no organomegaly  Musculoskeletal: no lower extremity edema, intact bilateral lower extremity pulses  Neurological: CN II-12 intact. No focal weakness  Psychiatrical: normal mood, cooperative  SKIN: no rash, lesions or ulcers        LABS:                        7.1    4.31  )-----------( 256      ( 09 Dec 2022 07:36 )             22.8     12-09    144  |  114<H>  |  49<H>  ----------------------------<  83  2.9<LL>   |  18<L>  |  5.85<H>    Ca    7.2<L>      09 Dec 2022 07:36    TPro  4.9<L>  /  Alb  1.6<L>  /  TBili  0.2  /  DBili  x   /  AST  20  /  ALT  92<H>  /  AlkPhos  321<H>  12-09      CAPILLARY BLOOD GLUCOSE        Culture - Blood (collected 05 Dec 2022 17:25)  Source: .Blood Blood-Peripheral  Preliminary Report (07 Dec 2022 02:02):    No growth to date.    Culture - Blood (collected 05 Dec 2022 17:20)  Source: .Blood Blood-Peripheral  Preliminary Report (07 Dec 2022 02:02):    No growth to date.      RADIOLOGY & ADDITIONAL TESTS:    Imaging Personally Reviewed:  [ ] YES  [ ] NO    Consultant(s) Notes Reviewed:  [ ] YES  [ ] NO    Care Discussed with Consultants/Other Providers [ ] YES  [ ] NO

## 2022-12-10 NOTE — PROGRESS NOTE ADULT - SUBJECTIVE AND OBJECTIVE BOX
NEPHROLOGY PROGRESS NOTE    CHIEF COMPLAINT:  YUNIEL/CKD    HPI:  Renal function stable.  He is c/o lower abdominal pain.    EXAM:  T(F): 98.6 (12-10-22 @ 10:55)  HR: 109 (12-10-22 @ 10:55)  BP: 134/84 (12-10-22 @ 10:55)  RR: 19 (12-10-22 @ 10:55)  SpO2: 97% (12-10-22 @ 10:55)    Conversant, in no apparent distress  Normal respiratory effort, lungs clear bilaterally  Heart RRR with no murmur, no peripheral edema         LABS                             7.8    4.57  )-----------( 179      ( 10 Dec 2022 06:15 )             26.1          12-10    146<H>  |  116<H>  |  49<H>  ----------------------------<  86  3.3<L>   |  19<L>  |  5.98<H>    Ca    7.5<L>      10 Dec 2022 06:15    TPro  5.4<L>  /  Alb  1.7<L>  /  TBili  0.2  /  DBili  x   /  AST  17  /  ALT  84<H>  /  AlkPhos  333<H>  12-10             Assessment   YUNIEL CKD 4; septic shock; ischemic ATN, non oliguric, off dialysis with stable indices    Plan  DC IV fluid

## 2022-12-10 NOTE — PROGRESS NOTE ADULT - SUBJECTIVE AND OBJECTIVE BOX
Patient is a 62y old  Male who presents with a chief complaint of Unresponsiveness/FTT (10 Dec 2022 12:52)      INTERVAL HPI/OVERNIGHT EVENTS:  Pt was seen and examined, no acute events.      MEDICATIONS  (STANDING):  chlorhexidine 2% Cloths 1 Application(s) Topical <User Schedule>  pantoprazole    Tablet 40 milliGRAM(s) Oral before breakfast  piperacillin/tazobactam IVPB.. 3.375 Gram(s) IV Intermittent every 12 hours  rifAXIMin 550 milliGRAM(s) Oral two times a day    MEDICATIONS  (PRN):  calcium carbonate    500 mG (Tums) Chewable 1 Tablet(s) Chew every 6 hours PRN Upset Stomach  sodium chloride 0.9% lock flush 10 milliLiter(s) IV Push every 1 hour PRN Pre/post blood products, medications, blood draw, and to maintain line patency      Allergies  Tylenol (Unknown)        Vital Signs Last 24 Hrs  T(C): 37.3 (10 Dec 2022 16:27), Max: 37.3 (10 Dec 2022 16:27)  T(F): 99.2 (10 Dec 2022 16:27), Max: 99.2 (10 Dec 2022 16:27)  HR: 102 (10 Dec 2022 16:27) (89 - 109)  BP: 154/83 (10 Dec 2022 16:27) (134/84 - 161/89)  BP(mean): --  RR: 18 (10 Dec 2022 16:27) (18 - 19)  SpO2: 98% (10 Dec 2022 16:27) (97% - 99%)    Parameters below as of 10 Dec 2022 16:27  Patient On (Oxygen Delivery Method): room air        PHYSICAL EXAM:  General: comfortable, no acute distress, well nourished  HEENT: Atraumatic, no LAD, trachea midline, PERRLA  Cardiovascular: normal s1s2, no murmurs, gallops or friction rubs  Pulmonary: clear to ausculation Bilaterally, no wheezing , rhonchi  Gastrointestinal: soft non tender non distended, no masses felt, no organomegaly  Musculoskeletal: no lower extremity edema, intact bilateral lower extremity pulses  Neurological: CN II-12 intact. No focal weakness  Psychiatrical: normal mood, cooperative  SKIN: no rash, lesions or ulcers      LABS:                        7.8    4.57  )-----------( 179      ( 10 Dec 2022 06:15 )             26.1     12-10    146<H>  |  116<H>  |  49<H>  ----------------------------<  86  3.3<L>   |  19<L>  |  5.98<H>    Ca    7.5<L>      10 Dec 2022 06:15    TPro  5.4<L>  /  Alb  1.7<L>  /  TBili  0.2  /  DBili  x   /  AST  17  /  ALT  84<H>  /  AlkPhos  333<H>  12-10        CAPILLARY BLOOD GLUCOSE          Culture - Blood (collected 05 Dec 2022 17:25)  Source: .Blood Blood-Peripheral  Preliminary Report (07 Dec 2022 02:02):    No growth to date.    Culture - Blood (collected 05 Dec 2022 17:20)  Source: .Blood Blood-Peripheral  Preliminary Report (07 Dec 2022 02:02):    No growth to date.      RADIOLOGY & ADDITIONAL TESTS:    Imaging Personally Reviewed:  [ ] YES  [ ] NO    Consultant(s) Notes Reviewed:  [ ] YES  [ ] NO    Care Discussed with Consultants/Other Providers [ ] YES  [ ] NO

## 2022-12-10 NOTE — PROGRESS NOTE ADULT - ASSESSMENT
63 y/o M w/HCV, emphysema, and chronic pancreatitis admitted for altered mental status. Intubated for acute respiratory failure. Hypotension likely secondary to severe sepsis with septic shock likely secondary to psuedomonal PNA, morganella bacteremia and kelbsiella UTI. CT chest with new cavitary lung lesion (not present on scan 8/30/22) favor cavitary PNA over malignancy due to rapid development. YUNIEL on CKD likely ATN. NSTEMI. Acute liver injury, likely shock liver.  Hyperkalemia.     Septic Shock POA due to psuedomonal PNA, Lung abscess, morganella bacteremia and kelbsiella UTI.  -Off midodrine  -Repeat CT scan : Interval decrease in size of the cavitary consolidation in the right upper lobe, which now measures 3.8 x 2.1 x 3.1 cm. Decreased amount of   air within the consolidation compared to 11/25/2022.  -Zosyn resumed per ID, Plan to continue for now, possibly can switch to Ceftin X 1 month , discussed with ID  -ID on board  -repeat CT abdomen with PO no signs of infection  -repeat bcx neg from 12/5    Acute respiratory failure due to septic shock due to Pseudo PNA  extubated, on room air  diuresis held due to YUNIEL    YUNIEL due to Septic ATN  S/p HD X 3  in ICU  Cr plateaued , hold off HD now  On bicarb drip  Renal following  Avoid nephrotoxic, no contrast , no NSAID    Hypokalemia:  - K replaced     Nausea/ vomiting:  - CT A/P on 12/5:  1.  Esophageal stent in place which is partially filled with debris/ingested material.  2.  The stomach is collapsed. There is gastric wall thickening with submucosal edema. Probable gastritis.  3.  Chronic calcific pancreatitis.  4.  Large amount of stool throughout the colon consistent with obstipation/constipation.  dulcolax and Miralax  - Asked GI to assess    Dispo: eventual [facility] when stable

## 2022-12-11 NOTE — PROGRESS NOTE ADULT - SUBJECTIVE AND OBJECTIVE BOX
NEPHROLOGY PROGRESS NOTE    CHIEF COMPLAINT:  YUNIEL/CKD    HPI:  Renal function about same.    ROS:  denies SOB    EXAM:  T(F): 98.9 (12-11-22 @ 11:22)  HR: 107 (12-11-22 @ 11:22)  BP: 134/75 (12-11-22 @ 11:22)  RR: 17 (12-11-22 @ 11:22)  SpO2: 100% (12-11-22 @ 11:22)    Conversant, in no apparent distress  Normal respiratory effort, lungs clear bilaterally  Heart RRR with no murmur, no peripheral edema         LABS                             7.6    5.34  )-----------( 255      ( 11 Dec 2022 06:19 )             24.0          12-11    147<H>  |  118<H>  |  46<H>  ----------------------------<  73  3.5   |  21<L>  |  5.65<H>    Ca    7.5<L>      11 Dec 2022 06:19    TPro  5.0<L>  /  Alb  1.6<L>  /  TBili  0.2  /  DBili  x   /  AST  14<L>  /  ALT  69  /  AlkPhos  307<H>  12-11           Assessment   YUNIEL CKD 4; septic shock; ischemic ATN, non oliguric, off dialysis with stable indices    Plan  Monitor daily BMP off IVF

## 2022-12-11 NOTE — PROGRESS NOTE ADULT - SUBJECTIVE AND OBJECTIVE BOX
Patient is a 62y old  Male who presents with a chief complaint of Unresponsiveness/FTT (11 Dec 2022 12:17)      INTERVAL HPI/OVERNIGHT EVENTS:  Pt was seen and examined, no acute events.      MEDICATIONS  (STANDING):  chlorhexidine 2% Cloths 1 Application(s) Topical <User Schedule>  pantoprazole    Tablet 40 milliGRAM(s) Oral before breakfast  piperacillin/tazobactam IVPB.. 3.375 Gram(s) IV Intermittent every 12 hours  rifAXIMin 550 milliGRAM(s) Oral two times a day    MEDICATIONS  (PRN):  calcium carbonate    500 mG (Tums) Chewable 1 Tablet(s) Chew every 6 hours PRN Upset Stomach  sodium chloride 0.9% lock flush 10 milliLiter(s) IV Push every 1 hour PRN Pre/post blood products, medications, blood draw, and to maintain line patency      Allergies  Tylenol (Unknown)      Vital Signs Last 24 Hrs  T(C): 36.8 (11 Dec 2022 16:10), Max: 37.2 (11 Dec 2022 11:22)  T(F): 98.2 (11 Dec 2022 16:10), Max: 98.9 (11 Dec 2022 11:22)  HR: 102 (11 Dec 2022 16:10) (102 - 107)  BP: 162/92 (11 Dec 2022 16:10) (134/75 - 162/92)  BP(mean): --  RR: 18 (11 Dec 2022 16:10) (17 - 18)  SpO2: 100% (11 Dec 2022 16:10) (99% - 100%)    Parameters below as of 11 Dec 2022 16:10  Patient On (Oxygen Delivery Method): room air        PHYSICAL EXAM:  General: comfortable, no acute distress, well nourished  HEENT: Atraumatic, no LAD, trachea midline, PERRLA  Cardiovascular: normal s1s2, no murmurs, gallops or friction rubs  Pulmonary: clear to ausculation Bilaterally, no wheezing , rhonchi  Gastrointestinal: soft non tender non distended, no masses felt, no organomegaly  Musculoskeletal: no lower extremity edema, intact bilateral lower extremity pulses  Neurological: CN II-12 intact. No focal weakness  Psychiatrical: normal mood, cooperative  SKIN: no rash, lesions or ulcers      LABS:                        7.6    5.34  )-----------( 255      ( 11 Dec 2022 06:19 )             24.0     12-11    147<H>  |  118<H>  |  46<H>  ----------------------------<  73  3.5   |  21<L>  |  5.65<H>    Ca    7.5<L>      11 Dec 2022 06:19    TPro  5.0<L>  /  Alb  1.6<L>  /  TBili  0.2  /  DBili  x   /  AST  14<L>  /  ALT  69  /  AlkPhos  307<H>  12-11        CAPILLARY BLOOD GLUCOSE          RADIOLOGY & ADDITIONAL TESTS:    Imaging Personally Reviewed:  [ ] YES  [ ] NO    Consultant(s) Notes Reviewed:  [ ] YES  [ ] NO    Care Discussed with Consultants/Other Providers [ ] YES  [ ] NO

## 2022-12-11 NOTE — PROGRESS NOTE ADULT - ASSESSMENT
61 y/o M w/HCV, emphysema, and chronic pancreatitis admitted for altered mental status. Intubated for acute respiratory failure. Hypotension likely secondary to severe sepsis with septic shock likely secondary to psuedomonal PNA, morganella bacteremia and kelbsiella UTI. CT chest with new cavitary lung lesion (not present on scan 8/30/22) favor cavitary PNA over malignancy due to rapid development. YUNIEL on CKD likely ATN. NSTEMI. Acute liver injury, likely shock liver.  Hyperkalemia.     Septic Shock POA due to psuedomonal PNA, Lung abscess, morganella bacteremia and kelbsiella UTI.  -Off midodrine  -Repeat CT scan : Interval decrease in size of the cavitary consolidation in the right upper lobe, which now measures 3.8 x 2.1 x 3.1 cm. Decreased amount of   air within the consolidation compared to 11/25/2022.  -Zosyn resumed per ID, Plan to continue for now, possibly can switch to Ceftin X 1 month , discussed with ID  -ID on board  -repeat CT abdomen with PO no signs of infection  -repeat bcx neg from 12/5    Acute respiratory failure due to septic shock due to Pseudo PNA  extubated, on room air  diuresis held due to YUNIEL    YUNIEL due to Septic ATN  S/p HD X 3  in ICU  Cr plateaued , hold off HD now  Renal following  Avoid nephrotoxic, no contrast , no NSAID    Hypokalemia:  - K replaced     Nausea/ vomiting:  - CT A/P on 12/5:  1.  Esophageal stent in place which is partially filled with debris/ingested material.  2.  The stomach is collapsed. There is gastric wall thickening with submucosal edema. Probable gastritis.  3.  Chronic calcific pancreatitis.  4.  Large amount of stool throughout the colon consistent with obstipation/constipation.  dulcolax and Miralax  - Asked GI to assess      DVT ppx  Dispo: eventual [facility] when stable

## 2022-12-12 NOTE — PROGRESS NOTE ADULT - SUBJECTIVE AND OBJECTIVE BOX
Patient is a 62y old  Male who presents with a chief complaint of Unresponsiveness/FTT (11 Dec 2022 17:26)      INTERVAL HPI/OVERNIGHT EVENTS:  Pt was seen and examined, no acute events.      MEDICATIONS  (STANDING):  chlorhexidine 2% Cloths 1 Application(s) Topical <User Schedule>  heparin   Injectable 5000 Unit(s) SubCutaneous every 12 hours  pantoprazole    Tablet 40 milliGRAM(s) Oral before breakfast  piperacillin/tazobactam IVPB.. 3.375 Gram(s) IV Intermittent every 12 hours  rifAXIMin 550 milliGRAM(s) Oral two times a day    MEDICATIONS  (PRN):  calcium carbonate    500 mG (Tums) Chewable 1 Tablet(s) Chew every 6 hours PRN Upset Stomach  sodium chloride 0.9% lock flush 10 milliLiter(s) IV Push every 1 hour PRN Pre/post blood products, medications, blood draw, and to maintain line patency      Allergies    Tylenol (Unknown)    Intolerances          Vital Signs Last 24 Hrs  T(C): 36.8 (12 Dec 2022 10:55), Max: 37.2 (12 Dec 2022 00:05)  T(F): 98.3 (12 Dec 2022 10:55), Max: 99 (12 Dec 2022 00:05)  HR: 105 (12 Dec 2022 10:55) (96 - 105)  BP: 160/87 (12 Dec 2022 10:55) (134/84 - 162/92)  BP(mean): --  RR: 18 (12 Dec 2022 10:55) (18 - 18)  SpO2: 99% (12 Dec 2022 10:55) (99% - 100%)    Parameters below as of 12 Dec 2022 10:55  Patient On (Oxygen Delivery Method): room air        PHYSICAL EXAM:  General: comfortable, no acute distress, well nourished  HEENT: Atraumatic, no LAD, trachea midline, PERRLA  Cardiovascular: normal s1s2, no murmurs, gallops or friction rubs  Pulmonary: clear to ausculation Bilaterally, no wheezing , rhonchi  Gastrointestinal: soft non tender non distended, no masses felt, no organomegaly  Musculoskeletal: no lower extremity edema, intact bilateral lower extremity pulses  Neurological: CN II-12 intact. No focal weakness  Psychiatrical: normal mood, cooperative  SKIN: no rash, lesions or ulcers        LABS:                        7.1    6.09  )-----------( 235      ( 12 Dec 2022 07:12 )             22.1     12-12    146<H>  |  118<H>  |  44<H>  ----------------------------<  90  3.7   |  22  |  5.21<H>    Ca    7.5<L>      12 Dec 2022 07:12    TPro  4.9<L>  /  Alb  1.5<L>  /  TBili  0.2  /  DBili  x   /  AST  14<L>  /  ALT  56  /  AlkPhos  274<H>  12-12        CAPILLARY BLOOD GLUCOSE          RADIOLOGY & ADDITIONAL TESTS:    Imaging Personally Reviewed:  [ ] YES  [ ] NO    Consultant(s) Notes Reviewed:  [ ] YES  [ ] NO    Care Discussed with Consultants/Other Providers [ ] YES  [ ] NO

## 2022-12-12 NOTE — PROGRESS NOTE ADULT - SUBJECTIVE AND OBJECTIVE BOX
ADDENDUM    Spoke to Dr Hinds at [facility].  Pt had esophageal stent placed at [facility] for benign stricture in 4/2022.  He was supposed to return in 4 weeks to remove stent but never showed up.  Pt to be transferred to [facility] for possible stent removal with Dr Hinds.  Pt is unreliable and will likely not show up if scheduled as ambulatory procedure.

## 2022-12-12 NOTE — PROGRESS NOTE ADULT - SUBJECTIVE AND OBJECTIVE BOX
IMELDA ROBERTSON  MRN-[id number]      Follow Up:  Bacteremia    Interval History: the pt was seen and examined earlier, no distress, continues to complain of abdominal pain, having bowel movements. No fevers, RA, no wbc.       PAST MEDICAL & SURGICAL HISTORY:  ETOH abuse  sober x1 year approximately      Pancreatitis      Hepatitis C  treated      Gout      HTN (hypertension)      Chronic kidney disease (CKD)      H/O chronic pancreatitis      Gout      Alcohol abuse      Gastric perforation          ROS:    [ ] Unobtainable because:  [x ] All other systems negative    Constitutional: no fever, no chills  Head: no trauma  Eyes: no vision changes, no eye pain  ENT:  no sore throat, no rhinorrhea  Cardiovascular:  no chest pain, no palpitation  Respiratory:  no SOB, no cough  GI:  + abd pain, denies nausea or vomiting during my exam, however, expectorates some content in the basin that is not clear, no diarrhea  urinary: no dysuria, no hematuria, no flank pain  musculoskeletal:  no joint pain, no joint swelling  skin:  no rash  neurology:  no headache, no seizure, no change in mental status  psych: no anxiety, no depression         Allergies  Tylenol (Unknown)        ANTIMICROBIALS:  piperacillin/tazobactam IVPB.. 3.375 every 12 hours  rifAXIMin 550 two times a day      OTHER MEDS:  calcium carbonate    500 mG (Tums) Chewable 1 Tablet(s) Chew every 6 hours PRN  chlorhexidine 2% Cloths 1 Application(s) Topical <User Schedule>  heparin   Injectable 5000 Unit(s) SubCutaneous every 12 hours  pantoprazole    Tablet 40 milliGRAM(s) Oral before breakfast  sodium chloride 0.9% lock flush 10 milliLiter(s) IV Push every 1 hour PRN      Vital Signs Last 24 Hrs  T(C): 36.2 (12 Dec 2022 16:30), Max: 37.2 (12 Dec 2022 00:05)  T(F): 97.1 (12 Dec 2022 16:30), Max: 99 (12 Dec 2022 00:05)  HR: 107 (12 Dec 2022 16:30) (96 - 107)  BP: 152/81 (12 Dec 2022 16:30) (134/84 - 160/87)  BP(mean): --  RR: 18 (12 Dec 2022 16:30) (18 - 18)  SpO2: 98% (12 Dec 2022 16:30) (98% - 100%)    Parameters below as of 12 Dec 2022 16:30  Patient On (Oxygen Delivery Method): room air        Physical Exam:  General: Chronically ill-appearing Male in no acute distress.  HEENT: Bitemporal wasting. Anicteric. Conjunctiva pink and moist. Isabela grossly clear.  Neck: Not rigid. No sense of mass.  Nodes: None palpable.  Lungs: Diminished BS Bilaterally, no wheezes, no rhonchi   Heart: Regular rate and rhythm. No audible murmur   Abdomen: Bowel sounds present and normoactive. Soft. Nondistended. Tender with palpation. No sense of mass. No organomegaly.  Extremities: No cyanosis or clubbing. 1+ edema.  Wasted   Skin: Warm. Dry. Good turgor. No rash. No vasculitic stigmata.  Psychiatric: Grossly appropriate mood and affect.    WBC Count: 6.09 K/uL (12-12 @ 07:12)  WBC Count: 5.34 K/uL (12-11 @ 06:19)  WBC Count: 4.57 K/uL (12-10 @ 06:15)  WBC Count: 4.31 K/uL (12-09 @ 07:36)  WBC Count: 6.33 K/uL (12-08 @ 08:25)  WBC Count: 9.02 K/uL (12-07 @ 05:35)  WBC Count: 7.79 K/uL (12-06 @ 11:34)                            7.1    6.09  )-----------( 235      ( 12 Dec 2022 07:12 )             22.1       12-12    146<H>  |  118<H>  |  44<H>  ----------------------------<  90  3.7   |  22  |  5.21<H>    Ca    7.5<L>      12 Dec 2022 07:12    TPro  4.9<L>  /  Alb  1.5<L>  /  TBili  0.2  /  DBili  x   /  AST  14<L>  /  ALT  56  /  AlkPhos  274<H>  12-12          Creatinine Trend: 5.21<--, 5.65<--, 5.98<--, 5.85<--, 6.00<--, 5.88<--      MICROBIOLOGY:  v  .Blood Blood-Peripheral  12-05-22   No Growth Final  --  --      .Blood Blood-Peripheral  12-05-22   No Growth Final  --  --      ET Tube ET Tube  11-27-22   No growth at 1 week.  --  --      ET Tube ET Tube  11-26-22   Few Pseudomonas aeruginosa  Normal Respiratory Mariah present  --  Pseudomonas aeruginosa      ET Tube ET Tube  11-25-22   Normal Respiratory Mariah present  --    Few polymorphonuclear leukocytes per low power field  No Squamous epithelial cells per low power field  Rare Gram Negative Rods per oil power field  Rare Gram Positive Cocci in Pairs and Chains per oil power field      Clean Catch Clean Catch (Midstream)  11-25-22   >100,000 CFU/ml Klebsiella pneumoniae  --  Klebsiella pneumoniae      .Blood Blood-Peripheral  11-25-22   Growth in anaerobic bottle: Morganella morganii  See previous culture [id number]  --  Blood Culture PCR  Morganella morganii    Ferritin, Serum: 264 (12-08)      D-Dimer Assay, Quantitative: 3160 (11-30)        SARS-CoV-2: NotDetec (25 Nov 2022 01:46)  COVID-19 PCR: NotDetec (19 Jul 2022 05:41)  COVID-19 PCR: NotDetec (13 Jul 2022 07:03)  SARS-CoV-2: NotDetec (06 Jul 2022 15:07)    RADIOLOGY:

## 2022-12-12 NOTE — PROGRESS NOTE ADULT - SUBJECTIVE AND OBJECTIVE BOX
[facility] NEPHROLOGY SERVICES, [facility]  NEPHROLOGY AND HYPERTENSION  [address]  [address]  [place] [address]  [phone]    MD HALIE OLIVIA, MD DHRUV REMY MD YELENA ROSENBERG, MD SHERON JUAREZ, MD JANICE ARREOLA MD          Patient events noted  No distress    MEDICATIONS  (STANDING):  chlorhexidine 2% Cloths 1 Application(s) Topical <User Schedule>  heparin   Injectable 5000 Unit(s) SubCutaneous every 12 hours  pantoprazole    Tablet 40 milliGRAM(s) Oral before breakfast  piperacillin/tazobactam IVPB.. 3.375 Gram(s) IV Intermittent every 12 hours  rifAXIMin 550 milliGRAM(s) Oral two times a day    MEDICATIONS  (PRN):  calcium carbonate    500 mG (Tums) Chewable 1 Tablet(s) Chew every 6 hours PRN Upset Stomach  sodium chloride 0.9% lock flush 10 milliLiter(s) IV Push every 1 hour PRN Pre/post blood products, medications, blood draw, and to maintain line patency      12-11-22 @ 07:01  -  12-12-22 @ 07:00  --------------------------------------------------------  IN: 700 mL / OUT: 0 mL / NET: 700 mL    12-12-22 @ 07:01  -  12-12-22 @ 22:56  --------------------------------------------------------  IN: 0 mL / OUT: 1250 mL / NET: -1250 mL      PHYSICAL EXAM:      T(C): 36.2 (12-12-22 @ 16:30), Max: 37.2 (12-12-22 @ 00:05)  HR: 107 (12-12-22 @ 16:30) (96 - 107)  BP: 152/81 (12-12-22 @ 16:30) (134/84 - 160/87)  RR: 18 (12-12-22 @ 16:30) (18 - 18)  SpO2: 98% (12-12-22 @ 16:30) (98% - 100%)  Wt(kg): --  Lungs clear  Heart S1S2  Abd soft NT ND  Extremities:   tr edema                                    7.1    6.09  )-----------( 235      ( 12 Dec 2022 07:12 )             22.1     12-12    146<H>  |  118<H>  |  44<H>  ----------------------------<  90  3.7   |  22  |  5.21<H>    Ca    7.5<L>      12 Dec 2022 07:12    TPro  4.9<L>  /  Alb  1.5<L>  /  TBili  0.2  /  DBili  x   /  AST  14<L>  /  ALT  56  /  AlkPhos  274<H>  12-12      LIVER FUNCTIONS - ( 12 Dec 2022 07:12 )  Alb: 1.5 g/dL / Pro: 4.9 gm/dL / ALK PHOS: 274 U/L / ALT: 56 U/L / AST: 14 U/L / GGT: x           Creatinine Trend: 5.21<--, 5.65<--, 5.98<--, 5.85<--, 6.00<--, 5.88<--      Assessment   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

## 2022-12-12 NOTE — PROGRESS NOTE ADULT - ASSESSMENT
Possible necrotizing pneumonia as source of bacteremia.  Wasted appearance but no clear or convincing source of SSTI  No perforation noted related to stent or otherwise, though CT was without IV or enteral contrast  Sputum and urine discordant with blood isolate  Has groin HD cath, not present on admission  Concern that lung process may be polymicrobial abscess  I don't think we need to invoke TB here    11/29: WBC in about the same range, can't expect much change in WBC from a dose or two, FiO2 only 30%. Hopefully some moderation in YUNIEL. LFT improving as well.  11/30: Hypothermia, not a new phenomenon, but recurrent.  No follow-up culture data as of yet.  The setting of recurrent hypothermia, would repeat blood cultures here.  White blood cell count slight decline, platelet count remains low.  Creatinine unfortunately increased.  Liver function test trending down.  Still suspect that bacteremia related to pulmonary process, though FiO2 requirements at this juncture are not great.  12/1: Extiubated and not hypothermic, so some positives steps here. Cultures not obtained as suggested yesterday.  12/2: Overall stable to improved. WBC declined. Exam essentially unchanged.  12/5: downgraded from ICU, no fevers, RA, no leukocytosis, LFTs better, no new culture data, will repeat BCs, today is day 11 of all abx, Zosyn IV continued, awaiting for CT a/p.   12/6: no fevers, RA, no WBC, Cr increased, CT a/p with po contrast - constipation, chronic pancreatitis, probable gastritis. The pt has a bowel movement prior my exam and abd exam is benign. Today is day #12 of all abx, day #10 of Zosyn, repeat BCs were collected yesterday and pending result.   Prior CT chest with "New large cavitary consolidation in the right upper lobe, likely infectious in etiology. Differential diagnosis is cavitary mass" - needs further f/up.   12/7: Septicemia appears to be controlled.  Zosyn completed earlier today–fell off MedX.  Again continue to believe that the source of the Morganella septicemia was the lung abscess, which has radiographically improved with antibiotics.  There is no standard of care with respect to duration of antibiotics for lung abscess.  It is typically on the order of 3 to 4 weeks.  Serial imaging will be needed to assure stability.  He may not insert resolve entirely.  These infections are not infrequently polymicrobial, but given the sensitivity profile of the blood isolate, there are no drugs p.o. but have good anaerobic activity as well as activity versus this isolate.  I do not think that IV antibiotics long-term, given his overall clinical condition, has a favorable risk-benefit ratio.  I think that the risk of line infection is high–even more so that he potentially will need a IV access device for dialysis.  Pseudomonas recovered most recently from sputum data is discordant with the Gram stain.  Not convinced that it needs to be specifically treated at this juncture.  The only oral option would be a fluoroquinolone, and this class of drugs have their problems.  Doubling coverage with 2 medications, doubles the risk of adverse events.  Overall I think that the course of action here that has the best risk-benefit ratio is use of a single agent such as cefuroxime which will cover the blood isolate as well as typical respiratory pathogens.  12/8: no fever, RA, no WBC, Cr 6.0, repeat BCs with no growth to date. As per attending, the pt will possibly need serial imaging of the chest. Zosyn IV continued for now.   12/9: remains afebrile, no leukocytosis, anemic with mild abd pain on today's exam, repeat BCs with no growth to date, will continue with Zosyn for now. If abdominal pain is improving and no new findings, ok to change abx to po Ceftin upon discharge - the pt will need four more weeks of abx and repeat CT chest to evaluate resolution or improvement of pt's chest findings.   12/12: no fever, RA, no wbc, anemic with hgb 7.1, Cr 5.21, LFTs ok, repeat BCs remain with no growth, on zosyn IV. Unclear what causes pt's abdominal pain, last CT a/p was done on 12/5 - gastritis, calcific pancreatitis, esophageal stent. Seen by GI today - recommends transfer to [facility] for the stent removal, which was placed in April 2022 for benign stricture and was supposed to be removed four weeks later but the pt was not compliant.   Ok to change abx to po Ceftin upon discharge - the pt will need four more weeks of abx and repeat CT chest to evaluate resolution or improvement of pt's chest findings      Suggestions  continue Zosyn while in pt  upon discharge change abx to po Ceftin x 4 weeks   Follow-up culture data - repeat BCs with no growth to date  Trend liver function test  Patient will need serial imaging of his chest to resolution or stability of radiographic findings and four more weeks of abx  GI consult is appreciated   Pending transfer to [facility] for the esophageal stent removal     Discussed with Dr. Cavazos  Discussed with Dr. Ty

## 2022-12-12 NOTE — CONSULT NOTE ADULT - SUBJECTIVE AND OBJECTIVE BOX
Full consult dictated    Plan: Pt with multiple medical problems - being treated for sepsis; + renal insufficiency. Now vomiting after eating. + dysphagia.  Pt had a stent placed by Dr Hinds at [facility] for a benign appearing stricture in the Spring.  According to CT stent now appears clogged. Will discuss further with Dr Hinds and will then make recommendations

## 2022-12-12 NOTE — PROGRESS NOTE ADULT - ASSESSMENT
61 y/o M w/HCV, emphysema, and chronic pancreatitis admitted for altered mental status. Intubated for acute respiratory failure. Hypotension likely secondary to severe sepsis with septic shock likely secondary to psuedomonal PNA, morganella bacteremia and kelbsiella UTI. CT chest with new cavitary lung lesion (not present on scan 8/30/22) favor cavitary PNA over malignancy due to rapid development. YUNIEL on CKD likely ATN. NSTEMI. Acute liver injury, likely shock liver.  Hyperkalemia.     Septic Shock POA due to psuedomonal PNA, Lung abscess, morganella bacteremia and kelbsiella UTI.  -Off midodrine  -Repeat CT scan : Interval decrease in size of the cavitary consolidation in the right upper lobe, which now measures 3.8 x 2.1 x 3.1 cm. Decreased amount of   air within the consolidation compared to 11/25/2022.  -Zosyn resumed per ID, Plan to continue for now, possibly can switch to Ceftin X 1 month , discussed with ID  -ID on board  -repeat CT abdomen with PO no signs of infection  -repeat bcx neg from 12/5    Acute respiratory failure due to septic shock due to Pseudo PNA  extubated, on room air  diuresis held due to YUNIEL    YUNIEL due to Septic ATN  S/p HD X 3  in ICU  Cr plateaued , hold off HD now  Renal following  Avoid nephrotoxic, no contrast , no NSAID    Hypokalemia:  - K replaced     Nausea/ vomiting:  - CT A/P on 12/5:  1.  Esophageal stent in place which is partially filled with debris/ingested material.  2.  The stomach is collapsed. There is gastric wall thickening with submucosal edema. Probable gastritis.  3.  Chronic calcific pancreatitis.  4.  Large amount of stool throughout the colon consistent with obstipation/constipation.  dulcolax and Miralax  - Asked GI to assess      DVT ppx  Dispo: eventual [facility] when stable   61 y/o M w/HCV, emphysema, and chronic pancreatitis admitted for altered mental status. Intubated for acute respiratory failure. Hypotension likely secondary to severe sepsis with septic shock likely secondary to psuedomonal PNA, morganella bacteremia and kelbsiella UTI. CT chest with new cavitary lung lesion (not present on scan 8/30/22) favor cavitary PNA over malignancy due to rapid development. YUNIEL on CKD likely ATN. NSTEMI. Acute liver injury, likely shock liver.  Hyperkalemia.     Septic Shock POA due to psuedomonal PNA, Lung abscess, morganella bacteremia and kelbsiella UTI.  -Off midodrine  -Repeat CT scan : Interval decrease in size of the cavitary consolidation in the right upper lobe, which now measures 3.8 x 2.1 x 3.1 cm. Decreased amount of   air within the consolidation compared to 11/25/2022.  -Zosyn resumed per ID, Plan to continue for now, possibly can switch to Ceftin X 1 month , discussed with ID  -ID on board  -repeat CT abdomen with PO no signs of infection  -repeat bcx neg from 12/5    Acute respiratory failure due to septic shock due to Pseudo PNA  extubated, on room air  diuresis held due to YUNIEL    YUNIEL due to Septic ATN, CKD 3:  S/p HD X 3  in ICU  Cr plateaued , hold off HD now  Renal following  Avoid nephrotoxic, no contrast , no NSAID    Hypokalemia:  - K replaced     Nausea/ vomiting:  - CT A/P on 12/5:  1.  Esophageal stent in place which is partially filled with debris/ingested material.  2.  The stomach is collapsed. There is gastric wall thickening with submucosal edema. Probable gastritis.  3.  Chronic calcific pancreatitis.  4.  Large amount of stool throughout the colon consistent with obstipation/constipation.  dulcolax and Miralax  H/O Esophageal stricture w/ dysphagia - Likely benign peptic stricture given long-standing severe esophagitis. S/p EGD w/ stent placement 4/19 (fully covered WallFlex stent, 18 mm x 103 mm) with Dr. Hinds.  - Abd pain - likely 2/2 chronic pancreatitis. Unrelated to stricture. Exam is notable for severe TTP to light skin touch. Possible neuropathic component.  - Asked GI to assess, Will need to transfer to [facility] to remove stent.    H/O HCV:  - s/p treatment    DVT ppx  Dispo: eventual [facility] when stable   63 y/o M w/HCV, emphysema, and chronic pancreatitis admitted for altered mental status. Intubated for acute respiratory failure. Hypotension likely secondary to severe sepsis with septic shock likely secondary to psuedomonal PNA, morganella bacteremia and kelbsiella UTI. CT chest with new cavitary lung lesion (not present on scan 8/30/22) favor cavitary PNA over malignancy due to rapid development.      Septic Shock POA due to psuedomonal PNA, Lung abscess, morganella bacteremia and kelbsiella UTI.  -Off midodrine  -Repeat CT scan : Interval decrease in size of the cavitary consolidation in the right upper lobe, which now measures 3.8 x 2.1 x 3.1 cm. Decreased amount of   air within the consolidation compared to 11/25/2022.  -Zosyn resumed per ID, Plan to continue for now, possibly can switch to Ceftin X 1 month , discussed with ID  -ID on board  -repeat CT abdomen with PO no signs of infection  -repeat bcx neg from 12/5    Acute respiratory failure due to septic shock due to Pseudo PNA  extubated, on room air  diuresis held due to YUNIEL    YUNIEL due to Septic ATN, CKD 3:  S/p HD X 3  in ICU  Cr plateaued , hold off HD now  Renal following  Avoid nephrotoxic, no contrast , no NSAID    Hypokalemia:  - K replaced     Nausea/ vomiting:  - CT A/P on 12/5:  1.  Esophageal stent in place which is partially filled with debris/ingested material.  2.  The stomach is collapsed. There is gastric wall thickening with submucosal edema. Probable gastritis.  3.  Chronic calcific pancreatitis.  4.  Large amount of stool throughout the colon consistent with obstipation/constipation.  dulcolax and Miralax  H/O Esophageal stricture w/ dysphagia - Likely benign peptic stricture given long-standing severe esophagitis. S/p EGD w/ stent placement 4/19 (fully covered WallFlex stent, 18 mm x 103 mm) with Dr. Hinds.  - Abd pain - likely 2/2 chronic pancreatitis. Unrelated to stricture. Exam is notable for severe TTP to light skin touch. Possible neuropathic component.  - Asked GI to assess, Will need to transfer to [facility] to remove stent.    H/O HCV:  - s/p treatment    DVT ppx  Dispo: eventual [facility] when stable

## 2022-12-13 NOTE — PROVIDER CONTACT NOTE (CRITICAL VALUE NOTIFICATION) - PERSON GIVING RESULT:
Aicha
Cesar Goins [facility]
Michele Treadwell
Lab Li Messina
TREVOR Mathews
Li Messina
daren
[facility]
Kennedy
Lab Jenifer

## 2022-12-13 NOTE — CHART NOTE - NSCHARTNOTEFT_GEN_A_CORE
Asked by RN to evaluate patient for abdominal pain.  Patient c/o pain to epigastric region.  Denies nausea / vomiting but reports he has been "spitting up" dark brownish material (emesis basis at bedside noted to have small amount of dark brown material).  No fever/ chills.      Vital Signs Last 24 Hrs  T(C): 36.2 (13 Dec 2022 05:05), Max: 36.8 (12 Dec 2022 10:55)  T(F): 97.2 (13 Dec 2022 05:05), Max: 98.3 (12 Dec 2022 10:55)  HR: 118 (13 Dec 2022 05:05) (105 - 118)  BP: 162/79 (13 Dec 2022 05:05) (152/81 - 164/90)  BP(mean): --  RR: 18 (13 Dec 2022 05:05) (18 - 18)  SpO2: 100% (13 Dec 2022 05:05) (98% - 100%)    General: lying in bed, NAD  Lungs: CTAB  Cardiac: + S1, S2  Abdomen: + BS, soft, non-distended, tender to palpation in epigastric region, no rebound/ guarding  Ext: no edeam    A/P: 62 yr old M with hx (per chart) of chronic pancreatitis s/p pusestow procedure, CKD4, hep C s/p SVR, esophageal stricture w/ stent in April was found unresponsive in basement. In ER patient unresponsive with severe metabolic abnormalities and intubated for airway protection. Labs notable for severe metabolic acidosis, hyperkalemia, elevated ammonia, & elevated troponins. Admitted to ICU at that time with metabolic/ hepatic encephelopathy, ARF requiring emergent HD, NSTEMI, Shock liver and septic shock. PT extubated 11/30/22, now on room air, awake, alert following commands and taking PO. Creatinine improving, no further plans for HD, continue to monitor renal recovery, Renal following. Completed 72 hour heparin gtt for NSTEMI and troponin downtrending, Cardiology following. Continue Zosyn for bacteremia as per ID.  Patient now c/o abdominal pain.  -KUB  -Morpine 1 mg x 1  -Send gastric contents for occult blood  -Check CBC/ type and screen  -GI follow up  -Pending transfer to [facility] for esophageal stent removal  -Will cont to closely monitor

## 2022-12-13 NOTE — PROGRESS NOTE ADULT - SUBJECTIVE AND OBJECTIVE BOX
Patient is a 62y old  Male who presents with a chief complaint of Unresponsiveness/FTT (13 Dec 2022 14:41)      INTERVAL HPI/OVERNIGHT EVENTS:  Pt was seen and examined, no acute events.      MEDICATIONS  (STANDING):  chlorhexidine 2% Cloths 1 Application(s) Topical <User Schedule>  heparin   Injectable 5000 Unit(s) SubCutaneous every 12 hours  pantoprazole    Tablet 40 milliGRAM(s) Oral before breakfast  piperacillin/tazobactam IVPB.. 3.375 Gram(s) IV Intermittent every 12 hours  rifAXIMin 550 milliGRAM(s) Oral two times a day    MEDICATIONS  (PRN):  calcium carbonate    500 mG (Tums) Chewable 1 Tablet(s) Chew every 6 hours PRN Upset Stomach  sodium chloride 0.9% lock flush 10 milliLiter(s) IV Push every 1 hour PRN Pre/post blood products, medications, blood draw, and to maintain line patency      Allergies  Tylenol (Unknown)        Vital Signs Last 24 Hrs  T(C): 36.8 (13 Dec 2022 16:31), Max: 36.9 (13 Dec 2022 11:15)  T(F): 98.3 (13 Dec 2022 16:31), Max: 98.5 (13 Dec 2022 11:15)  HR: 112 (13 Dec 2022 16:31) (112 - 125)  BP: 111/71 (13 Dec 2022 16:31) (111/71 - 164/90)  BP(mean): --  RR: 18 (13 Dec 2022 16:31) (18 - 18)  SpO2: 100% (13 Dec 2022 16:31) (100% - 100%)    Parameters below as of 13 Dec 2022 16:31  Patient On (Oxygen Delivery Method): room air        PHYSICAL EXAM:  General: comfortable, no acute distress, well nourished  HEENT: Atraumatic, no LAD, trachea midline, PERRLA  Cardiovascular: normal s1s2, no murmurs, gallops or friction rubs  Pulmonary: clear to ausculation Bilaterally, no wheezing , rhonchi  Gastrointestinal: soft non tender non distended, no masses felt, no organomegaly  Musculoskeletal: no lower extremity edema, intact bilateral lower extremity pulses  Neurological: CN II-12 intact. No focal weakness  Psychiatrical: normal mood, cooperative  SKIN: no rash, lesions or ulcers          LABS:                        6.2    9.23  )-----------( 208      ( 13 Dec 2022 10:45 )             20.0     12-13    149<H>  |  119<H>  |  62<H>  ----------------------------<  87  3.6   |  20<L>  |  5.22<H>    Ca    7.9<L>      13 Dec 2022 10:45    TPro  4.9<L>  /  Alb  1.5<L>  /  TBili  0.2  /  DBili  x   /  AST  14<L>  /  ALT  56  /  AlkPhos  274<H>  12-12        CAPILLARY BLOOD GLUCOSE          RADIOLOGY & ADDITIONAL TESTS:    Imaging Personally Reviewed:  [ ] YES  [ ] NO    Consultant(s) Notes Reviewed:  [ ] YES  [ ] NO    Care Discussed with Consultants/Other Providers [ ] YES  [ ] NO

## 2022-12-13 NOTE — PROGRESS NOTE ADULT - SUBJECTIVE AND OBJECTIVE BOX
Pt continues to bring up all PO intake  Pt has esophageal stent for stricture - placed by Dr Hinds at [facility]  +severe anemia      MEDICATIONS  (STANDING):  chlorhexidine 2% Cloths 1 Application(s) Topical <User Schedule>  heparin   Injectable 5000 Unit(s) SubCutaneous every 12 hours  pantoprazole    Tablet 40 milliGRAM(s) Oral before breakfast  piperacillin/tazobactam IVPB.. 3.375 Gram(s) IV Intermittent every 12 hours  rifAXIMin 550 milliGRAM(s) Oral two times a day    MEDICATIONS  (PRN):  calcium carbonate    500 mG (Tums) Chewable 1 Tablet(s) Chew every 6 hours PRN Upset Stomach  sodium chloride 0.9% lock flush 10 milliLiter(s) IV Push every 1 hour PRN Pre/post blood products, medications, blood draw, and to maintain line patency      Allergies    Tylenol (Unknown)    Intolerances        Vital Signs Last 24 Hrs  T(C): 36.9 (13 Dec 2022 11:15), Max: 36.9 (13 Dec 2022 11:15)  T(F): 98.5 (13 Dec 2022 11:15), Max: 98.5 (13 Dec 2022 11:15)  HR: 125 (13 Dec 2022 11:15) (107 - 125)  BP: 125/62 (13 Dec 2022 11:15) (125/62 - 164/90)  BP(mean): --  RR: 18 (13 Dec 2022 11:15) (18 - 18)  SpO2: 100% (13 Dec 2022 11:15) (98% - 100%)    Parameters below as of 13 Dec 2022 11:15  Patient On (Oxygen Delivery Method): room air        PHYSICAL EXAM:  General: NAD.  CVS: S1, S2  Chest: air entry bilaterally present  Abd: BS present, soft, non-tender      LABS:                        6.2    9.23  )-----------( 208      ( 13 Dec 2022 10:45 )             20.0     12-13    149<H>  |  119<H>  |  62<H>  ----------------------------<  87  3.6   |  20<L>  |  5.22<H>    Ca    7.9<L>      13 Dec 2022 10:45    TPro  4.9<L>  /  Alb  1.5<L>  /  TBili  0.2  /  DBili  x   /  AST  14<L>  /  ALT  56  /  AlkPhos  274<H>  12-12      Transfuse 2u prbc's  Hospitalist to arrange transfer to [facility]  Dr Hinds aware of pt

## 2022-12-13 NOTE — PROGRESS NOTE ADULT - SUBJECTIVE AND OBJECTIVE BOX
[facility] NEPHROLOGY SERVICES, [facility]  NEPHROLOGY AND HYPERTENSION  [address]  [address]  [place] [address]  [phone]    MD HALIE OLIVIA, MD DHRUV REMY, MD ROGERIO ESPOSITO, MD SHERON JUAREZ, MD JANICE ARREOLA MD          Patient events noted  Hgb to 6.2  mild abd discomfort     MEDICATIONS  (STANDING):  chlorhexidine 2% Cloths 1 Application(s) Topical <User Schedule>  heparin   Injectable 5000 Unit(s) SubCutaneous every 12 hours  pantoprazole    Tablet 40 milliGRAM(s) Oral before breakfast  piperacillin/tazobactam IVPB.. 3.375 Gram(s) IV Intermittent every 12 hours  rifAXIMin 550 milliGRAM(s) Oral two times a day    MEDICATIONS  (PRN):  calcium carbonate    500 mG (Tums) Chewable 1 Tablet(s) Chew every 6 hours PRN Upset Stomach  sodium chloride 0.9% lock flush 10 milliLiter(s) IV Push every 1 hour PRN Pre/post blood products, medications, blood draw, and to maintain line patency      12-12-22 @ 07:01  -  12-13-22 @ 07:00  --------------------------------------------------------  IN: 0 mL / OUT: 1250 mL / NET: -1250 mL      PHYSICAL EXAM:      T(C): 36.9 (12-13-22 @ 11:15), Max: 36.9 (12-13-22 @ 11:15)  HR: 125 (12-13-22 @ 11:15) (107 - 125)  BP: 125/62 (12-13-22 @ 11:15) (125/62 - 164/90)  RR: 18 (12-13-22 @ 11:15) (18 - 18)  SpO2: 100% (12-13-22 @ 11:15) (98% - 100%)  Wt(kg): --  Lungs clear  Heart S1S2  Abd soft NT ND  Extremities:   tr edema                                    6.2    9.23  )-----------( 208      ( 13 Dec 2022 10:45 )             20.0     12-13    149<H>  |  119<H>  |  62<H>  ----------------------------<  87  3.6   |  20<L>  |  5.22<H>    Ca    7.9<L> 9.9     13 Dec 2022 10:45    TPro  4.9<L>  /  Alb  1.5<L>  /  TBili  0.2  /  DBili  x   /  AST  14<L>  /  ALT  56  /  AlkPhos  274<H>  12-12      LIVER FUNCTIONS - ( 12 Dec 2022 07:12 )  Alb: 1.5 g/dL / Pro: 4.9 gm/dL / ALK PHOS: 274 U/L / ALT: 56 U/L / AST: 14 U/L / GGT: x           Creatinine Trend: 5.22<--, 5.21<--, 5.65<--, 5.98<--, 5.85<--, 6.00<--      Assessment   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

## 2022-12-13 NOTE — PROGRESS NOTE ADULT - SUBJECTIVE AND OBJECTIVE BOX
IMELDA ROBERTSON  MRN-[id number]    Follow Up:  bacteremia, lung abscess     Interval History: the pt was seen and examined earlier, no acute distress, continues to complain of abdominal pain and continues expectorating dark sputum. The pt is afebrile, RA, no wbc.     PAST MEDICAL & SURGICAL HISTORY:  ETOH abuse  sober x1 year approximately      Pancreatitis      Hepatitis C  treated      Gout      HTN (hypertension)      Chronic kidney disease (CKD)      H/O chronic pancreatitis      Gout      Alcohol abuse      Gastric perforation          ROS:    [ ] Unobtainable because:  [x ] All other systems negative    Constitutional: no fever, no chills  Head: no trauma  Eyes: no vision changes, no eye pain  ENT:  no sore throat, no rhinorrhea  Cardiovascular:  no chest pain, no palpitation  Respiratory:  no SOB, no cough  GI:  + abd pain, expectorates dark sputum, no diarrhea  urinary: no dysuria, no hematuria, no flank pain  musculoskeletal:  no joint pain, no joint swelling  skin:  no rash  neurology:  no headache, no seizure, no change in mental status  psych: no anxiety, no depression         Allergies  Tylenol (Unknown)        ANTIMICROBIALS:  piperacillin/tazobactam IVPB.. 3.375 every 12 hours  rifAXIMin 550 two times a day      OTHER MEDS:  calcium carbonate    500 mG (Tums) Chewable 1 Tablet(s) Chew every 6 hours PRN  chlorhexidine 2% Cloths 1 Application(s) Topical <User Schedule>  heparin   Injectable 5000 Unit(s) SubCutaneous every 12 hours  pantoprazole    Tablet 40 milliGRAM(s) Oral before breakfast  sodium chloride 0.9% lock flush 10 milliLiter(s) IV Push every 1 hour PRN      Vital Signs Last 24 Hrs  T(C): 36.9 (13 Dec 2022 11:15), Max: 36.9 (13 Dec 2022 11:15)  T(F): 98.5 (13 Dec 2022 11:15), Max: 98.5 (13 Dec 2022 11:15)  HR: 125 (13 Dec 2022 11:15) (107 - 125)  BP: 125/62 (13 Dec 2022 11:15) (125/62 - 164/90)  BP(mean): --  RR: 18 (13 Dec 2022 11:15) (18 - 18)  SpO2: 100% (13 Dec 2022 11:15) (98% - 100%)    Parameters below as of 13 Dec 2022 11:15  Patient On (Oxygen Delivery Method): room air        Physical Exam:  General: Chronically ill-appearing Male in no acute distress. RA  HEENT: Bitemporal wasting. Anicteric. Conjunctiva pink and moist. Unable to assess pt's mouth due to non-compliance   Neck: Not rigid. No sense of mass.  Nodes: None palpable.  Lungs: Diminished BS Bilaterally, no wheezes, no rhonchi   Heart: Regular rate and rhythm. No audible murmur   Abdomen: Bowel sounds present and normoactive. Soft. Nondistended. remains Tender with palpation. No sense of mass. No organomegaly.  Extremities: No cyanosis or clubbing. 1+ edema.  Wasted   Skin: Warm. Dry. Good turgor. No rash. No vasculitic stigmata.  Psychiatric: Grossly appropriate mood and affect.    WBC Count: 9.23 K/uL (12-13 @ 10:45)  WBC Count: 6.09 K/uL (12-12 @ 07:12)  WBC Count: 5.34 K/uL (12-11 @ 06:19)  WBC Count: 4.57 K/uL (12-10 @ 06:15)  WBC Count: 4.31 K/uL (12-09 @ 07:36)  WBC Count: 6.33 K/uL (12-08 @ 08:25)  WBC Count: 9.02 K/uL (12-07 @ 05:35)                            6.2    9.23  )-----------( 208      ( 13 Dec 2022 10:45 )             20.0       12-13    149<H>  |  119<H>  |  62<H>  ----------------------------<  87  3.6   |  20<L>  |  5.22<H>    Ca    7.9<L>      13 Dec 2022 10:45    TPro  4.9<L>  /  Alb  1.5<L>  /  TBili  0.2  /  DBili  x   /  AST  14<L>  /  ALT  56  /  AlkPhos  274<H>  12-12          Creatinine Trend: 5.22<--, 5.21<--, 5.65<--, 5.98<--, 5.85<--, 6.00<--      MICROBIOLOGY:  v  .Blood Blood-Peripheral  12-05-22   No Growth Final  --  --      .Blood Blood-Peripheral  12-05-22   No Growth Final  --  --      ET Tube ET Tube  11-27-22   No growth at 1 week.  --  --      ET Tube ET Tube  11-26-22   Few Pseudomonas aeruginosa  Normal Respiratory Mariah present  --  Pseudomonas aeruginosa      ET Tube ET Tube  11-25-22   Normal Respiratory Mariah present  --    Few polymorphonuclear leukocytes per low power field  No Squamous epithelial cells per low power field  Rare Gram Negative Rods per oil power field  Rare Gram Positive Cocci in Pairs and Chains per oil power field      Clean Catch Clean Catch (Midstream)  11-25-22   >100,000 CFU/ml Klebsiella pneumoniae  --  Klebsiella pneumoniae      .Blood Blood-Peripheral  11-25-22   Growth in anaerobic bottle: Morganella morganii  See previous culture [id number]  --  Blood Culture PCR  Morganella morganii    Ferritin, Serum: 264 (12-08)      D-Dimer Assay, Quantitative: 3160 (11-30)        SARS-CoV-2: NotDetec (25 Nov 2022 01:46)  COVID-19 PCR: NotDetec (19 Jul 2022 05:41)  COVID-19 PCR: NotDetec (13 Jul 2022 07:03)  SARS-CoV-2: NotDetec (06 Jul 2022 15:07)    RADIOLOGY:

## 2022-12-13 NOTE — PROVIDER CONTACT NOTE (CRITICAL VALUE NOTIFICATION) - TEST AND RESULT REPORTED:
blood culture growth anaerobic bottle gram neg rods
calcium :5.3
H/H  6.2/ 20.00
K LEVEL
Troponin 754.3
Calcium 6.4
Trop 3402.4
Ca-5 Troponin-5648
Hep C-weakly reactive
Potassium 7.4 CO2 5 and lactate 2.9

## 2022-12-13 NOTE — CHART NOTE - NSCHARTNOTEFT_GEN_A_CORE
Per chart pt with PMH: pancreatitis, CKD4, esophageal stricture, EtOH use, HTN, presents unresponsive with FTT, intubated on admission 11/25, extubated 11/30. No plan for HD as of yet per MD note (12/05). Nephrology following. Pending possible transfer for esophageal stent removal at [facility].    Factors impacting intake: [x] none [ ] nausea  [ ] vomiting [ ] diarrhea [ ] constipation  [ ]chewing problems [ ] swallowing issues  [ ] other:     Diet Prescription: Diet, Full Liquid (12-10-22 @ 18:26)    Intake: % as per flow sheets    Current Weight:   % Weight Change    Pertinent Medications: MEDICATIONS  (STANDING):  chlorhexidine 2% Cloths 1 Application(s) Topical <User Schedule>  heparin   Injectable 5000 Unit(s) SubCutaneous every 12 hours  pantoprazole    Tablet 40 milliGRAM(s) Oral before breakfast  piperacillin/tazobactam IVPB.. 3.375 Gram(s) IV Intermittent every 12 hours  rifAXIMin 550 milliGRAM(s) Oral two times a day    MEDICATIONS  (PRN):  calcium carbonate    500 mG (Tums) Chewable 1 Tablet(s) Chew every 6 hours PRN Upset Stomach  sodium chloride 0.9% lock flush 10 milliLiter(s) IV Push every 1 hour PRN Pre/post blood products, medications, blood draw, and to maintain line patency    Pertinent Labs: 12-13 Na149 mmol/L<H> Glu 87 mg/dL K+ 3.6 mmol/L Cr  5.22 mg/dL<H> BUN 62 mg/dL<H> 12-12 Alb 1.5 g/dL<L>      Skin:     Estimated Needs:   [ ] no change since previous assessment  [ ] recalculated:     Previous Nutrition Diagnosis:   [ ] Inadequate Energy Intake [ ]Inadequate Oral Intake [ ] Excessive Energy Intake   [ ] Underweight [ ] Increased Nutrient Needs [ ] Overweight/Obesity   [ ] Altered GI Function [ ] Unintended Weight Loss [ ] Food & Nutrition Related Knowledge Deficit [ ] Malnutrition     Nutrition Diagnosis is [ ] ongoing  [ ] resolved [ ] not applicable     New Nutrition Diagnosis: [ ] not applicable      Interventions:   Recommend  [ ] Change Diet To:  [ ] Nutrition Supplement  [ ] Nutrition Support  [ ] Other:     Monitoring and Evaluation:   [ ] PO intake [ x ] Tolerance to diet prescription [ x ] weights [ x ] labs[ x ] follow up per protocol  [ ] other: Per chart pt with PMH: pancreatitis, CKD4, esophageal stricture, EtOH use, HTN, presents unresponsive with FTT, intubated on admission 11/25, extubated 11/30. No plan for HD as of yet per MD note (12/05). Nephrology following. Pending possible transfer for esophageal stent removal at [facility].    Factors impacting intake: [x] none [ ] nausea  [ ] vomiting [ ] diarrhea [ ] constipation  [ ]chewing problems [ ] swallowing issues  [ ] other:     Diet Prescription: Diet, Full Liquid (12-10-22 @ 18:26)    Intake: % as per flow sheets    Current Weight: (12/13) 55.9kg (12/03) 44.8kg (11/25) 42.3kg  % Weight Change: ?accuracy of 12/13 bed scale weight    No noted edema as per flow sheets.     Physical Appearance:     Pertinent Medications: MEDICATIONS  (STANDING):  chlorhexidine 2% Cloths 1 Application(s) Topical <User Schedule>  heparin   Injectable 5000 Unit(s) SubCutaneous every 12 hours  pantoprazole    Tablet 40 milliGRAM(s) Oral before breakfast  piperacillin/tazobactam IVPB.. 3.375 Gram(s) IV Intermittent every 12 hours  rifAXIMin 550 milliGRAM(s) Oral two times a day    MEDICATIONS  (PRN):  calcium carbonate    500 mG (Tums) Chewable 1 Tablet(s) Chew every 6 hours PRN Upset Stomach  sodium chloride 0.9% lock flush 10 milliLiter(s) IV Push every 1 hour PRN Pre/post blood products, medications, blood draw, and to maintain line patency    Pertinent Labs: 12-13 Na149 mmol/L<H> Glu 87 mg/dL K+ 3.6 mmol/L Cr  5.22 mg/dL<H> BUN 62 mg/dL<H> 12-12 Alb 1.5 g/dL<L>      Skin: Pressure injuries as per flow sheets: 1. sacrum- stage II  2. coccyx- unstageable    Estimated Needs:   [x] no change since previous assessment: for kcal and fluids: 11/28/22 and 12/06 for protein  [ ] recalculated:     Previous Nutrition Diagnosis:   [x]  [x] Malnutrition- Severe malnutrition in the context of chronic illness      Related to: Inadequate protein-energy intake and increased protein-energy needs in setting of pressure injuries, CKD, multiple hospitalizations x 2 years    As evidenced by: Physical findings of severe muscle wasting and fat loss     Goal: Pt to consume >75% meals/supplements during LOS (met)      Nutrition Diagnosis is [x] ongoing  [ ] resolved [ ] not applicable     New Nutrition Diagnosis: [x] not applicable      Interventions:   Recommend  [x] Continue current diet as ordered  [ ] Change Diet To:  [ ] Nutrition Supplement  [ ] Nutrition Support  [ ] Other:     Monitoring and Evaluation:   [x] PO intake [ x ] Tolerance to diet prescription [ x ] weights [ x ] labs[ x ] follow up per protocol  [ ] other: Per chart pt with PMH: pancreatitis, CKD4, esophageal stricture, EtOH use, HTN, presents unresponsive with FTT, intubated on admission 11/25, extubated 11/30. No plan for HD as of yet per MD note (12/05). Nephrology following. Per GI pt bringing up all PO intake (12/13). Pending possible transfer for esophageal stent removal at [facility].    Factors impacting intake: [x] none [ ] nausea  [ ] vomiting [ ] diarrhea [ ] constipation  [ ]chewing problems [ ] swallowing issues  [ ] other:     Diet Prescription: Diet, Full Liquid (12-10-22 @ 18:26)    Intake: % as per flow sheets    Current Weight: (12/13) 55.9kg (12/03) 44.8kg (11/25) 42.3kg  % Weight Change: ?accuracy of 12/13 bed scale weight    No noted edema as per flow sheets.     Physical Appearance:     Pertinent Medications: MEDICATIONS  (STANDING):  chlorhexidine 2% Cloths 1 Application(s) Topical <User Schedule>  heparin   Injectable 5000 Unit(s) SubCutaneous every 12 hours  pantoprazole    Tablet 40 milliGRAM(s) Oral before breakfast  piperacillin/tazobactam IVPB.. 3.375 Gram(s) IV Intermittent every 12 hours  rifAXIMin 550 milliGRAM(s) Oral two times a day    MEDICATIONS  (PRN):  calcium carbonate    500 mG (Tums) Chewable 1 Tablet(s) Chew every 6 hours PRN Upset Stomach  sodium chloride 0.9% lock flush 10 milliLiter(s) IV Push every 1 hour PRN Pre/post blood products, medications, blood draw, and to maintain line patency    Pertinent Labs: 12-13 Na149 mmol/L<H> Glu 87 mg/dL K+ 3.6 mmol/L Cr  5.22 mg/dL<H> BUN 62 mg/dL<H> 12-12 Alb 1.5 g/dL<L>      Skin: Pressure injuries as per flow sheets: 1. sacrum- stage II  2. coccyx- unstageable    Estimated Needs:   [x] no change since previous assessment: for kcal and fluids: 11/28/22 and 12/06 for protein  [ ] recalculated:     Previous Nutrition Diagnosis:   [x]  [x] Malnutrition- Severe malnutrition in the context of chronic illness      Related to: Inadequate protein-energy intake and increased protein-energy needs in setting of pressure injuries, CKD, multiple hospitalizations x 2 years    As evidenced by: Physical findings of severe muscle wasting and fat loss     Goal: Pt to consume >75% meals/supplements during LOS (met)      Nutrition Diagnosis is [x] ongoing  [ ] resolved [ ] not applicable     New Nutrition Diagnosis: [x] not applicable      Interventions:   Recommend  [x] Continue current diet as ordered  [ ] Change Diet To:  [ ] Nutrition Supplement  [ ] Nutrition Support  [ ] Other:     Monitoring and Evaluation:   [x] PO intake [ x ] Tolerance to diet prescription [ x ] weights [ x ] labs[ x ] follow up per protocol  [ ] other: Per chart pt with PMH: pancreatitis, CKD4, esophageal stricture with stent placement 04/2022, EtOH use, HTN, presents unresponsive with FTT, intubated on admission 11/25, extubated 11/30. No plan for HD as of yet per MD note (12/05). Nephrology following. Per GI pt bringing up all PO intake (12/13). Pending possible transfer for esophageal stent removal at [facility].    Pt asleep at time of visit with basin in his lap.    Factors impacting intake: [ ] none [ ] nausea  [x] vomiting/spitting up [ ] diarrhea [ ] constipation  [ ]chewing problems [ ] swallowing issues  [ ] other:     Diet Prescription: Diet, Full Liquid (12-10-22 @ 18:26)    Intake: % as per flow sheets    Current Weight: (12/13) 55.9kg (12/03) 44.8kg (11/25) 42.3kg  % Weight Change: ?accuracy of 12/13 bed scale weight    No noted edema as per flow sheets.     Physical Appearance: Unable to conduct nutrition focused physical exam as pt asleep at time of visit    Pertinent Medications: MEDICATIONS  (STANDING):  chlorhexidine 2% Cloths 1 Application(s) Topical <User Schedule>  heparin   Injectable 5000 Unit(s) SubCutaneous every 12 hours  pantoprazole    Tablet 40 milliGRAM(s) Oral before breakfast  piperacillin/tazobactam IVPB.. 3.375 Gram(s) IV Intermittent every 12 hours  rifAXIMin 550 milliGRAM(s) Oral two times a day    MEDICATIONS  (PRN):  calcium carbonate    500 mG (Tums) Chewable 1 Tablet(s) Chew every 6 hours PRN Upset Stomach  sodium chloride 0.9% lock flush 10 milliLiter(s) IV Push every 1 hour PRN Pre/post blood products, medications, blood draw, and to maintain line patency    Pertinent Labs: 12-13 Na149 mmol/L<H> Glu 87 mg/dL K+ 3.6 mmol/L Cr  5.22 mg/dL<H> BUN 62 mg/dL<H> 12-12 Alb 1.5 g/dL<L>      Skin: Pressure injuries as per flow sheets: 1. sacrum- stage II  2. coccyx- unstageable    Estimated Needs:   [x] no change since previous assessment: for kcal and fluids: 11/28/22 and 12/06 for protein  [ ] recalculated:     Previous Nutrition Diagnosis:   [x]  [x] Malnutrition- Severe malnutrition in the context of chronic illness      Related to: Inadequate protein-energy intake and increased protein-energy needs in setting of pressure injuries, CKD, multiple hospitalizations x 2 years    As evidenced by: Physical findings of severe muscle wasting and fat loss as noted on 12/06    Goal: Pt to consume >75% meals/supplements during LOS (met)      Nutrition Diagnosis is [x] ongoing  [ ] resolved [ ] not applicable     New Nutrition Diagnosis: [x] not applicable      Interventions:   Recommend  [x] Continue current diet as ordered  [ ] Change Diet To:  [ ] Nutrition Supplement  [ ] Nutrition Support  [ ] Other:     Monitoring and Evaluation:   [x] PO intake [ x ] Tolerance to diet prescription [ x ] weights [ x ] labs[ x ] follow up per protocol  [ ] other:

## 2022-12-13 NOTE — PROGRESS NOTE ADULT - ASSESSMENT
Possible necrotizing pneumonia as source of bacteremia.  Wasted appearance but no clear or convincing source of SSTI  No perforation noted related to stent or otherwise, though CT was without IV or enteral contrast  Sputum and urine discordant with blood isolate  Has groin HD cath, not present on admission  Concern that lung process may be polymicrobial abscess  I don't think we need to invoke TB here    11/29: WBC in about the same range, can't expect much change in WBC from a dose or two, FiO2 only 30%. Hopefully some moderation in YUNIEL. LFT improving as well.  11/30: Hypothermia, not a new phenomenon, but recurrent.  No follow-up culture data as of yet.  The setting of recurrent hypothermia, would repeat blood cultures here.  White blood cell count slight decline, platelet count remains low.  Creatinine unfortunately increased.  Liver function test trending down.  Still suspect that bacteremia related to pulmonary process, though FiO2 requirements at this juncture are not great.  12/1: Extiubated and not hypothermic, so some positives steps here. Cultures not obtained as suggested yesterday.  12/2: Overall stable to improved. WBC declined. Exam essentially unchanged.  12/5: downgraded from ICU, no fevers, RA, no leukocytosis, LFTs better, no new culture data, will repeat BCs, today is day 11 of all abx, Zosyn IV continued, awaiting for CT a/p.   12/6: no fevers, RA, no WBC, Cr increased, CT a/p with po contrast - constipation, chronic pancreatitis, probable gastritis. The pt has a bowel movement prior my exam and abd exam is benign. Today is day #12 of all abx, day #10 of Zosyn, repeat BCs were collected yesterday and pending result.   Prior CT chest with "New large cavitary consolidation in the right upper lobe, likely infectious in etiology. Differential diagnosis is cavitary mass" - needs further f/up.   12/7: Septicemia appears to be controlled.  Zosyn completed earlier today–fell off [facility].  Again continue to believe that the source of the Morganella septicemia was the lung abscess, which has radiographically improved with antibiotics.  There is no standard of care with respect to duration of antibiotics for lung abscess.  It is typically on the order of 3 to 4 weeks.  Serial imaging will be needed to assure stability.  He may not insert resolve entirely.  These infections are not infrequently polymicrobial, but given the sensitivity profile of the blood isolate, there are no drugs p.o. but have good anaerobic activity as well as activity versus this isolate.  I do not think that IV antibiotics long-term, given his overall clinical condition, has a favorable risk-benefit ratio.  I think that the risk of line infection is high–even more so that he potentially will need a IV access device for dialysis.  Pseudomonas recovered most recently from sputum data is discordant with the Gram stain.  Not convinced that it needs to be specifically treated at this juncture.  The only oral option would be a fluoroquinolone, and this class of drugs have their problems.  Doubling coverage with 2 medications, doubles the risk of adverse events.  Overall I think that the course of action here that has the best risk-benefit ratio is use of a single agent such as cefuroxime which will cover the blood isolate as well as typical respiratory pathogens.  12/8: no fever, RA, no WBC, Cr 6.0, repeat BCs with no growth to date. As per attending, the pt will possibly need serial imaging of the chest. Zosyn IV continued for now.   12/9: remains afebrile, no leukocytosis, anemic with mild abd pain on today's exam, repeat BCs with no growth to date, will continue with Zosyn for now. If abdominal pain is improving and no new findings, ok to change abx to po Ceftin upon discharge - the pt will need four more weeks of abx and repeat CT chest to evaluate resolution or improvement of pt's chest findings.   12/12: no fever, RA, no wbc, anemic with hgb 7.1, Cr 5.21, LFTs ok, repeat BCs remain with no growth, on zosyn IV. Unclear what causes pt's abdominal pain, last CT a/p was done on 12/5 - gastritis, calcific pancreatitis, esophageal stent. Seen by GI today - recommends transfer to [facility] for the stent removal, which was placed in April 2022 for benign stricture and was supposed to be removed four weeks later but the pt was not compliant.   Ok to change abx to po Ceftin upon discharge - the pt will need four more weeks of abx and repeat CT chest to evaluate resolution or improvement of pt's chest findings  12/13: no fever, RA, no wbc, repeat BCs remain with no growth to date, abdominal pain persists, pending transfer to [facility], Zosyn IV continued. Pt is anemic, pending transfusion, GI following.       Suggestions  continue Zosyn while in pt  upon discharge change abx to po Ceftin x 4 weeks total  Follow-up culture data - repeat BCs with no growth to date  Trend liver function test  Patient will need serial imaging of his chest to resolution or stability of radiographic findings and four more weeks of abx  GI follow up is appreciated   Pending transfer to [facility] for the esophageal stent removal   pain control    Discussed with Dr. Cavazos

## 2022-12-13 NOTE — PROGRESS NOTE ADULT - ASSESSMENT
61 y/o M w/HCV, emphysema, and chronic pancreatitis admitted for altered mental status. Intubated for acute respiratory failure. Hypotension likely secondary to severe sepsis with septic shock likely secondary to psuedomonal PNA, morganella bacteremia and kelbsiella UTI. CT chest with new cavitary lung lesion (not present on scan 8/30/22) favor cavitary PNA over malignancy due to rapid development.      Septic Shock POA due to psuedomonal PNA, Lung abscess, morganella bacteremia and kelbsiella UTI.  -Off midodrine  -Repeat CT scan : Interval decrease in size of the cavitary consolidation in the right upper lobe, which now measures 3.8 x 2.1 x 3.1 cm. Decreased amount of   air within the consolidation compared to 11/25/2022.  -Continue Zosyn while inpt, can switch to Ceftin X 1 month , discussed with ID  -ID on board  -repeat CT abdomen with PO no signs of infection  -repeat bcx neg from 12/5  -Will need repeat CT chest outpt before stopping Abx     Acute respiratory failure due to septic shock due to Pseudo PNA  extubated, on room air  diuresis held due to YUNIEL    YUNIEL due to Septic ATN, CKD 3:  S/p HD X 3  in ICU  Cr plateaued , hold off HD now  Renal following  Avoid nephrotoxic, no contrast , no NSAID    Hypokalemia:  - K replaced     Esophageal stricture, S/P stents:  - Now with persistent nausea. vomiting  - CT A/P on 12/5:  1.  Esophageal stent in place which is partially filled with debris/ingested material.  2.  The stomach is collapsed. There is gastric wall thickening with submucosal edema. Probable gastritis.  3.  Chronic calcific pancreatitis.  4.  Large amount of stool throughout the colon consistent with obstipation/constipation.  - dulcolax and Miralax  H/O Esophageal stricture w/ dysphagia - Likely benign peptic stricture given long-standing severe esophagitis. S/p EGD w/ stent placement 4/19 (fully covered WallFlex stent, 18 mm x 103 mm) with Dr. Hinds.  - Abd pain - likely 2/2 chronic pancreatitis. Unrelated to stricture. Exam is notable for severe TTP to light skin touch. Possible neuropathic component.  - Pending transfer to [facility] to remove stent. Transfer center aware, accepting hospitalist Dr. Porter. Dr. Hinds aware.    H/O HCV:  - s/p treatment    Anemia:  - Hb worsening:  - In the setting of chronic disease, CKD  - IV iron , EMMANUEL per renal  - One unit PRBC ordered  - Np overt bleed    DVT ppx  - S/ Q heparin  Dispo: eventual [facility] when stable   63 y/o M w/HCV, emphysema, and chronic pancreatitis admitted for altered mental status. Intubated for acute respiratory failure. Hypotension likely secondary to severe sepsis with septic shock likely secondary to psuedomonal PNA, morganella bacteremia and kelbsiella UTI. CT chest with new cavitary lung lesion (not present on scan 8/30/22) favor cavitary PNA over malignancy due to rapid development.      Septic Shock POA due to psuedomonal PNA, Lung abscess, morganella bacteremia and kelbsiella UTI.  -Off midodrine  -Repeat CT scan : Interval decrease in size of the cavitary consolidation in the right upper lobe, which now measures 3.8 x 2.1 x 3.1 cm. Decreased amount of   air within the consolidation compared to 11/25/2022.  -Continue Zosyn while inpt, can switch to Ceftin X 1 month , discussed with ID  -ID on board  -repeat CT abdomen with PO no signs of infection  -repeat bcx neg from 12/5  -Will need repeat CT chest outpt before stopping Abx     Acute respiratory failure due to septic shock due to Pseudo PNA  extubated, on room air  diuresis held due to YUNIEL    YUNIEL due to Septic ATN, CKD 3:  S/p HD X 3  in ICU  Cr plateaued , hold off HD now  Renal following  Avoid nephrotoxic, no contrast , no NSAID    Hypokalemia:  - K replaced     Esophageal stricture, S/P stents:  - Now with persistent nausea. vomiting  - CT A/P on 12/5:  1.  Esophageal stent in place which is partially filled with debris/ingested material.  2.  The stomach is collapsed. There is gastric wall thickening with submucosal edema. Probable gastritis.  3.  Chronic calcific pancreatitis.  4.  Large amount of stool throughout the colon consistent with obstipation/constipation.  - dulcolax and Miralax  H/O Esophageal stricture w/ dysphagia - Likely benign peptic stricture given long-standing severe esophagitis. S/p EGD w/ stent placement 4/19 (fully covered WallFlex stent, 18 mm x 103 mm) with Dr. Hinds.  - Abd pain - likely 2/2 chronic pancreatitis. Unrelated to stricture. Exam is notable for severe TTP to light skin touch. Possible neuropathic component.  - Pending transfer to [facility] to remove stent. Transfer center aware, accepting hospitalist Dr. Porter. Dr. Hinds aware.    Now COVID pos:  - SO2 ok in RA  - Continue to monitor   - Check DD  - Isolation protocol    H/O HCV:  - s/p treatment    Anemia:  - Hb worsening:  - In the setting of chronic disease, CKD  - IV iron , EMMANUEL per renal  - One unit PRBC ordered  - Np overt bleed    DVT ppx  - S/ Q heparin  Dispo: eventual [facility] when stable

## 2022-12-14 NOTE — PROGRESS NOTE ADULT - SUBJECTIVE AND OBJECTIVE BOX
Patient is a 62y old  Male who presents with a chief complaint of Unresponsiveness/FTT (14 Dec 2022 11:22)    INTERVAL HPI/OVERNIGHT EVENTS: Patients seen and examined at bedside this morning. No acute events overnight. Pt reports retching with current diet. Pain with swallowing.     MEDICATIONS  (STANDING):  chlorhexidine 2% Cloths 1 Application(s) Topical <User Schedule>  dextrose 5% + sodium chloride 0.45%. 1000 milliLiter(s) (75 mL/Hr) IV Continuous <Continuous>  heparin   Injectable 5000 Unit(s) SubCutaneous every 12 hours  pantoprazole    Tablet 40 milliGRAM(s) Oral before breakfast  piperacillin/tazobactam IVPB.. 3.375 Gram(s) IV Intermittent every 12 hours  rifAXIMin 550 milliGRAM(s) Oral two times a day    MEDICATIONS  (PRN):  calcium carbonate    500 mG (Tums) Chewable 1 Tablet(s) Chew every 6 hours PRN Upset Stomach  morphine  - Injectable 2 milliGRAM(s) IV Push every 6 hours PRN Severe Pain (7 - 10)  sodium chloride 0.9% lock flush 10 milliLiter(s) IV Push every 1 hour PRN Pre/post blood products, medications, blood draw, and to maintain line patency    Allergies    Tylenol (Unknown)    Intolerances      REVIEW OF SYSTEMS:  All other systems reviewed and are negative    Vital Signs Last 24 Hrs  T(C): 36.6 (14 Dec 2022 10:20), Max: 37.1 (13 Dec 2022 21:10)  T(F): 97.8 (14 Dec 2022 10:20), Max: 98.8 (13 Dec 2022 21:10)  HR: 98 (14 Dec 2022 10:20) (98 - 114)  BP: 131/75 (14 Dec 2022 10:20) (107/69 - 134/79)  BP(mean): --  RR: 18 (14 Dec 2022 10:20) (16 - 18)  SpO2: 100% (14 Dec 2022 10:20) (98% - 100%)    Parameters below as of 14 Dec 2022 10:20  Patient On (Oxygen Delivery Method): room air      Daily     Daily Weight in k.2 (14 Dec 2022 05:10)  I&O's Summary    CAPILLARY BLOOD GLUCOSE        PHYSICAL EXAM:  General: comfortable, no acute distress, well nourished  HEENT: Atraumatic, no LAD, trachea midline, PERRLA  Cardiovascular: normal s1s2, no murmurs, gallops or friction rubs  Pulmonary: clear to ausculation Bilaterally, no wheezing , rhonchi  Gastrointestinal: soft non tender non distended, no masses felt, no organomegaly  Musculoskeletal: no lower extremity edema, intact bilateral lower extremity pulses  Neurological: CN II-12 intact. No focal weakness  Psychiatrical: normal mood, cooperative  SKIN: no rash, lesions or ulcers    Labs                          8.9    12.25 )-----------( 188      ( 14 Dec 2022 06:22 )             27.5     12-14    146<H>  |  116<H>  |  67<H>  ----------------------------<  78  3.7   |  20<L>  |  5.42<H>    Ca    7.8<L>      14 Dec 2022 06:22    TPro  5.2<L>  /  Alb  1.5<L>  /  TBili  0.3  /  DBili  x   /  AST  13<L>  /  ALT  40  /  AlkPhos  223<H>  12-14                            Radiology and Imaging reviewed.

## 2022-12-14 NOTE — PROGRESS NOTE ADULT - ASSESSMENT
63 y/o M w/HCV, emphysema, and chronic pancreatitis admitted for altered mental status. Intubated for acute respiratory failure. Hypotension likely secondary to severe sepsis with septic shock likely secondary to psuedomonal PNA, morganella bacteremia and kelbsiella UTI. CT chest with new cavitary lung lesion (not present on scan 8/30/22) favor cavitary PNA over malignancy due to rapid development.      Septic Shock POA due to psuedomonal PNA, Lung abscess, morganella bacteremia and kelbsiella UTI.  -Off midodrine  -Repeat CT scan : Interval decrease in size of the cavitary consolidation in the right upper lobe, which now measures 3.8 x 2.1 x 3.1 cm. Decreased amount of   air within the consolidation compared to 11/25/2022.  -Continue Zosyn while inpt, can switch to Ceftin X 1 month , discussed with ID  -ID on board  -repeat CT abdomen with PO no signs of infection  -repeat bcx neg from 12/5  -Will need repeat CT chest outpt before stopping Abx     Acute respiratory failure due to septic shock due to Pseudo PNA  extubated, on room air  diuresis held due to YUNIEL    YUNIEL due to Septic ATN, CKD 3:  S/p HD X 3  in ICU  Cr plateaued , hold off HD now  Renal following  Avoid nephrotoxic, no contrast , no NSAID    Hypokalemia:  - K replaced     Esophageal stricture, S/P stents:  - Now with persistent nausea. vomiting  - CT A/P on 12/5:  1.  Esophageal stent in place which is partially filled with debris/ingested material.  2.  The stomach is collapsed. There is gastric wall thickening with submucosal edema. Probable gastritis.  3.  Chronic calcific pancreatitis.  4.  Large amount of stool throughout the colon consistent with obstipation/constipation.  - dulcolax and Miralax  H/O Esophageal stricture w/ dysphagia - Likely benign peptic stricture given long-standing severe esophagitis. S/p EGD w/ stent placement 4/19 (fully covered WallFlex stent, 18 mm x 103 mm) with Dr. Hinds.  - Abd pain - likely 2/2 chronic pancreatitis. Unrelated to stricture. Exam is notable for severe TTP to light skin touch. Possible neuropathic component.  - Pending transfer to [facility] to remove stent. Transfer center aware, accepting hospitalist Dr. Porter. Dr. Hinds aware.  (12/14) Diet changed to clear liquid and morphine prn for pain    Now COVID pos:  - SO2 ok in RA  - Continue to monitor   - Check DD  - Isolation protocol    H/O HCV:  - s/p treatment    Anemia:  - Hb worsening:  - In the setting of chronic disease, CKD  - IV iron , EMMANUEL per renal  - One unit PRBC ordered  - Np overt bleed    DVT ppx  - S/ Q heparin  Dispo: [facility] transfer pending bed

## 2022-12-14 NOTE — PROGRESS NOTE ADULT - PA/NP ONLY VISIT
PA/NP only visit

## 2022-12-14 NOTE — PROGRESS NOTE ADULT - PROVIDER SPECIALTY LIST ADULT
Cardiology
Cardiology
Critical Care
Critical Care
Gastroenterology
Gastroenterology
Hospitalist
Hospitalist
Nephrology
Cardiology
Critical Care
Critical Care
Gastroenterology
Hospitalist
Hospitalist
Infectious Disease
Nephrology
Critical Care
Hospitalist
Hospitalist
Internal Medicine
Internal Medicine
Nephrology
Cardiology
Critical Care
Hospitalist
Hospitalist
Infectious Disease
Internal Medicine
Infectious Disease

## 2022-12-14 NOTE — PROGRESS NOTE ADULT - REASON FOR ADMISSION
Unresponsiveness/FTT

## 2022-12-14 NOTE — PROGRESS NOTE ADULT - ASSESSMENT
Possible necrotizing pneumonia as source of bacteremia.  Wasted appearance but no clear or convincing source of SSTI  No perforation noted related to stent or otherwise, though CT was without IV or enteral contrast  Sputum and urine discordant with blood isolate  Has groin HD cath, not present on admission  Concern that lung process may be polymicrobial abscess  I don't think we need to invoke TB here    11/29: WBC in about the same range, can't expect much change in WBC from a dose or two, FiO2 only 30%. Hopefully some moderation in YUNIEL. LFT improving as well.  11/30: Hypothermia, not a new phenomenon, but recurrent.  No follow-up culture data as of yet.  The setting of recurrent hypothermia, would repeat blood cultures here.  White blood cell count slight decline, platelet count remains low.  Creatinine unfortunately increased.  Liver function test trending down.  Still suspect that bacteremia related to pulmonary process, though FiO2 requirements at this juncture are not great.  12/1: Extiubated and not hypothermic, so some positives steps here. Cultures not obtained as suggested yesterday.  12/2: Overall stable to improved. WBC declined. Exam essentially unchanged.  12/5: downgraded from ICU, no fevers, RA, no leukocytosis, LFTs better, no new culture data, will repeat BCs, today is day 11 of all abx, Zosyn IV continued, awaiting for CT a/p.   12/6: no fevers, RA, no WBC, Cr increased, CT a/p with po contrast - constipation, chronic pancreatitis, probable gastritis. The pt has a bowel movement prior my exam and abd exam is benign. Today is day #12 of all abx, day #10 of Zosyn, repeat BCs were collected yesterday and pending result.   Prior CT chest with "New large cavitary consolidation in the right upper lobe, likely infectious in etiology. Differential diagnosis is cavitary mass" - needs further f/up.   12/7: Septicemia appears to be controlled.  Zosyn completed earlier today–fell off MedX.  Again continue to believe that the source of the Morganella septicemia was the lung abscess, which has radiographically improved with antibiotics.  There is no standard of care with respect to duration of antibiotics for lung abscess.  It is typically on the order of 3 to 4 weeks.  Serial imaging will be needed to assure stability.  He may not insert resolve entirely.  These infections are not infrequently polymicrobial, but given the sensitivity profile of the blood isolate, there are no drugs p.o. but have good anaerobic activity as well as activity versus this isolate.  I do not think that IV antibiotics long-term, given his overall clinical condition, has a favorable risk-benefit ratio.  I think that the risk of line infection is high–even more so that he potentially will need a IV access device for dialysis.  Pseudomonas recovered most recently from sputum data is discordant with the Gram stain.  Not convinced that it needs to be specifically treated at this juncture.  The only oral option would be a fluoroquinolone, and this class of drugs have their problems.  Doubling coverage with 2 medications, doubles the risk of adverse events.  Overall I think that the course of action here that has the best risk-benefit ratio is use of a single agent such as cefuroxime which will cover the blood isolate as well as typical respiratory pathogens.  12/8: no fever, RA, no WBC, Cr 6.0, repeat BCs with no growth to date. As per attending, the pt will possibly need serial imaging of the chest. Zosyn IV continued for now.   12/9: remains afebrile, no leukocytosis, anemic with mild abd pain on today's exam, repeat BCs with no growth to date, will continue with Zosyn for now. If abdominal pain is improving and no new findings, ok to change abx to po Ceftin upon discharge - the pt will need four more weeks of abx and repeat CT chest to evaluate resolution or improvement of pt's chest findings.   12/12: no fever, RA, no wbc, anemic with hgb 7.1, Cr 5.21, LFTs ok, repeat BCs remain with no growth, on zosyn IV. Unclear what causes pt's abdominal pain, last CT a/p was done on 12/5 - gastritis, calcific pancreatitis, esophageal stent. Seen by GI today - recommends transfer to [facility] for the stent removal, which was placed in April 2022 for benign stricture and was supposed to be removed four weeks later but the pt was not compliant.   Ok to change abx to po Ceftin upon discharge - the pt will need four more weeks of abx and repeat CT chest to evaluate resolution or improvement of pt's chest findings  12/13: no fever, RA, no wbc, repeat BCs remain with no growth to date, abdominal pain persists, pending transfer to [facility], Zosyn IV continued. Pt is anemic, pending transfusion, GI following.   12/14: COVID 19 positive now, denies any past history of covid 19, not vaccinated, has no fevers, on RA and comfortable, no cough. No role for covid 19 treatment at this time. Pt is pending transfer to [facility], WBC elevated, Cr elevated 5.42, repeat BCs remain with no growth to date, Zosyn IV continued.       Suggestions  continue Zosyn while in pt  upon discharge change abx to po Ceftin x 4 weeks total  Follow-up culture data - repeat BCs with no growth to date  Trend liver function test  Patient will need serial imaging of his chest to resolution or stability of radiographic findings and four more weeks of abx  GI follow up is appreciated   Pending transfer to [facility] for the esophageal stent removal   pain control  monitor pt's respiratory status      Discussed with Dr. Cavazos

## 2022-12-14 NOTE — PROGRESS NOTE ADULT - NUTRITIONAL ASSESSMENT
This patient has been assessed with a concern for Malnutrition and has been determined to have a diagnosis/diagnoses of Severe protein-calorie malnutrition and Underweight (BMI < 19).    This patient is being managed with:   Diet Regular-  60 gm Protein (PRO60G)  No Concentrated Potassium  Low Sodium  Supplement Feeding Modality:  Oral  Suplena Cans or Servings Per Day:  1       Frequency:  Two Times a day  Entered: Dec  6 2022  1:11PM    
This patient has been assessed with a concern for Malnutrition and has been determined to have a diagnosis/diagnoses of Underweight (BMI < 19).    This patient is being managed with:   Diet NPO with Tube Feed-  Tube Feeding Modality: Nasogastric  Nepro with Carb Steady  Total Volume for 24 Hours (mL): 1200  Continuous  Starting Tube Feed Rate {mL per Hour}: 20  Increase Tube Feed Rate by (mL): 10     Every 8 hours  Until Goal Tube Feed Rate (mL per Hour): 50  Tube Feed Duration (in Hours): 24  Tube Feed Start Time: 16:00  Entered: Nov 28 2022  2:19PM    
This patient has been assessed with a concern for Malnutrition and has been determined to have a diagnosis/diagnoses of Severe protein-calorie malnutrition and Underweight (BMI < 19).    This patient is being managed with:   Diet Clear Liquid-  Entered: Dec 14 2022 10:51AM    
This patient has been assessed with a concern for Malnutrition and has been determined to have a diagnosis/diagnoses of Severe protein-calorie malnutrition and Underweight (BMI < 19).    This patient is being managed with:   Diet Full Liquid-  Entered: Dec 10 2022  6:25PM    
This patient has been assessed with a concern for Malnutrition and has been determined to have a diagnosis/diagnoses of Underweight (BMI < 19).    This patient is being managed with:   Diet NPO-  Except Medications  Entered: Dec  1 2022  2:06AM    
This patient has been assessed with a concern for Malnutrition and has been determined to have a diagnosis/diagnoses of Severe protein-calorie malnutrition and Underweight (BMI < 19).    This patient is being managed with:   Diet Full Liquid-  Entered: Dec 10 2022  6:25PM    
This patient has been assessed with a concern for Malnutrition and has been determined to have a diagnosis/diagnoses of Severe protein-calorie malnutrition and Underweight (BMI < 19).    This patient is being managed with:   Diet Regular-  60 gm Protein (PRO60G)  No Concentrated Potassium  Low Sodium  Supplement Feeding Modality:  Oral  Suplena Cans or Servings Per Day:  1       Frequency:  Two Times a day  Entered: Dec  6 2022  1:11PM    
This patient has been assessed with a concern for Malnutrition and has been determined to have a diagnosis/diagnoses of Underweight (BMI < 19).    This patient is being managed with:   Diet NPO with Tube Feed-  Tube Feeding Modality: Nasogastric  Nepro with Carb Steady  Total Volume for 24 Hours (mL): 1200  Continuous  Starting Tube Feed Rate {mL per Hour}: 20  Increase Tube Feed Rate by (mL): 10     Every 8 hours  Until Goal Tube Feed Rate (mL per Hour): 50  Tube Feed Duration (in Hours): 24  Tube Feed Start Time: 16:00  Entered: Nov 28 2022  2:19PM    
This patient has been assessed with a concern for Malnutrition and has been determined to have a diagnosis/diagnoses of Underweight (BMI < 19).    This patient is being managed with:   Diet NPO-  Except Medications  Entered: Dec  1 2022  2:06AM    
This patient has been assessed with a concern for Malnutrition and has been determined to have a diagnosis/diagnoses of Severe protein-calorie malnutrition and Underweight (BMI < 19).    This patient is being managed with:   Diet Full Liquid-  Entered: Dec 10 2022  6:25PM    
This patient has been assessed with a concern for Malnutrition and has been determined to have a diagnosis/diagnoses of Severe protein-calorie malnutrition and Underweight (BMI < 19).    This patient is being managed with:   Diet Regular-  60 gm Protein (PRO60G)  No Concentrated Potassium  Low Sodium  Supplement Feeding Modality:  Oral  Suplena Cans or Servings Per Day:  1       Frequency:  Two Times a day  Entered: Dec  6 2022  1:11PM    
This patient has been assessed with a concern for Malnutrition and has been determined to have a diagnosis/diagnoses of Severe protein-calorie malnutrition and Underweight (BMI < 19).    This patient is being managed with:   Diet Regular-  60 gm Protein (PRO60G)  No Concentrated Potassium  Low Sodium  Supplement Feeding Modality:  Oral  Suplena Cans or Servings Per Day:  1       Frequency:  Two Times a day  Entered: Dec  6 2022  1:11PM    
This patient has been assessed with a concern for Malnutrition and has been determined to have a diagnosis/diagnoses of Underweight (BMI < 19).    This patient is being managed with:   Diet DASH/TLC-  Sodium & Cholesterol Restricted  Entered: Dec  6 2022 10:05AM    
This patient has been assessed with a concern for Malnutrition and has been determined to have a diagnosis/diagnoses of Underweight (BMI < 19).    This patient is being managed with:   Diet Pureed-  Entered: Dec  1 2022 10:31AM    
This patient has been assessed with a concern for Malnutrition and has been determined to have a diagnosis/diagnoses of Underweight (BMI < 19).    This patient is being managed with:   Diet Pureed-  Entered: Dec  1 2022 10:31AM    
This patient has been assessed with a concern for Malnutrition and has been determined to have a diagnosis/diagnoses of Underweight (BMI < 19).    This patient is being managed with:   Diet NPO-  Except Medications  Entered: Dec  1 2022  2:06AM

## 2022-12-14 NOTE — PROGRESS NOTE ADULT - SUBJECTIVE AND OBJECTIVE BOX
IMELDA ROBERTSON  MRN-[id number]    Follow Up:  covid 19, bacteremia    Interval History: the pt was seen and examined earlier, no acute distress, continues to complain of epigastric pain. No fevers, RA, tachycardic, WBC elevated, + COVID 19 now.     PAST MEDICAL & SURGICAL HISTORY:  ETOH abuse  sober x1 year approximately      Pancreatitis      Hepatitis C  treated      Gout      HTN (hypertension)      Chronic kidney disease (CKD)      H/O chronic pancreatitis      Gout      Alcohol abuse      Gastric perforation          ROS:    [ ] Unobtainable because:  [x ] All other systems negative    Constitutional: no fever, no chills  Head: no trauma  Eyes: no vision changes, no eye pain  ENT:  no sore throat, no rhinorrhea  Cardiovascular:  no chest pain, no palpitation  Respiratory:  no SOB, no cough  GI:  + abd pain, no diarrhea  urinary: no dysuria, no hematuria, no flank pain  musculoskeletal:  no joint pain, no joint swelling  skin:  no rash  neurology:  no headache, no seizure, no change in mental status  psych: no anxiety, no depression         Allergies  Tylenol (Unknown)        ANTIMICROBIALS:  piperacillin/tazobactam IVPB.. 3.375 every 12 hours  rifAXIMin 550 two times a day      OTHER MEDS:  calcium carbonate    500 mG (Tums) Chewable 1 Tablet(s) Chew every 6 hours PRN  chlorhexidine 2% Cloths 1 Application(s) Topical <User Schedule>  dextrose 5% + sodium chloride 0.45%. 1000 milliLiter(s) IV Continuous <Continuous>  heparin   Injectable 5000 Unit(s) SubCutaneous every 12 hours  morphine  - Injectable 2 milliGRAM(s) IV Push every 6 hours PRN  pantoprazole    Tablet 40 milliGRAM(s) Oral before breakfast  sodium chloride 0.9% lock flush 10 milliLiter(s) IV Push every 1 hour PRN      Vital Signs Last 24 Hrs  T(C): 36.7 (14 Dec 2022 15:55), Max: 37.1 (13 Dec 2022 21:10)  T(F): 98 (14 Dec 2022 15:55), Max: 98.8 (13 Dec 2022 21:10)  HR: 96 (14 Dec 2022 15:55) (96 - 113)  BP: 129/73 (14 Dec 2022 15:55) (107/69 - 134/79)  BP(mean): --  RR: 18 (14 Dec 2022 15:55) (16 - 18)  SpO2: 100% (14 Dec 2022 15:55) (98% - 100%)    Parameters below as of 14 Dec 2022 15:55  Patient On (Oxygen Delivery Method): room air        Physical Exam:  General: Chronically ill-appearing Male in no acute distress. RA  HEENT: Bitemporal wasting. Anicteric. Conjunctiva pink and moist. Unable to assess pt's mouth due to non-compliance   Neck: Not rigid. No sense of mass.  Nodes: None palpable.  Lungs: Diminished BS Bilaterally, no wheezes, no rhonchi   Heart: Regular rate and rhythm. No audible murmur   Abdomen: Bowel sounds present and normoactive. Soft. Nondistended. remains Tender with palpation. No sense of mass. No organomegaly.  Extremities: No cyanosis or clubbing. 1+ edema.  Wasted   Skin: Warm. Dry. Good turgor. No rash. No vasculitic stigmata.  Psychiatric: Grossly appropriate mood and affect.    WBC Count: 12.25 K/uL (12-14 @ 06:22)  WBC Count: 9.23 K/uL (12-13 @ 10:45)  WBC Count: 6.09 K/uL (12-12 @ 07:12)  WBC Count: 5.34 K/uL (12-11 @ 06:19)  WBC Count: 4.57 K/uL (12-10 @ 06:15)  WBC Count: 4.31 K/uL (12-09 @ 07:36)  WBC Count: 6.33 K/uL (12-08 @ 08:25)                            8.9    12.25 )-----------( 188      ( 14 Dec 2022 06:22 )             27.5       12-14    146<H>  |  116<H>  |  67<H>  ----------------------------<  78  3.7   |  20<L>  |  5.42<H>    Ca    7.8<L>      14 Dec 2022 06:22    TPro  5.2<L>  /  Alb  1.5<L>  /  TBili  0.3  /  DBili  x   /  AST  13<L>  /  ALT  40  /  AlkPhos  223<H>  12-14          Creatinine Trend: 5.42<--, 5.22<--, 5.21<--, 5.65<--, 5.98<--, 5.85<--      MICROBIOLOGY:  v  .Blood Blood-Peripheral  12-05-22   No Growth Final  --  --      .Blood Blood-Peripheral  12-05-22   No Growth Final  --  --      ET Tube ET Tube  11-27-22   No acid-fast bacilli isolated at 2 weeks.  --  --      ET Tube ET Tube  11-26-22   Few Pseudomonas aeruginosa  Normal Respiratory Mariah present  --  Pseudomonas aeruginosa      ET Tube ET Tube  11-25-22   Normal Respiratory Mariah present  --    Few polymorphonuclear leukocytes per low power field  No Squamous epithelial cells per low power field  Rare Gram Negative Rods per oil power field  Rare Gram Positive Cocci in Pairs and Chains per oil power field      Clean Catch Clean Catch (Midstream)  11-25-22   >100,000 CFU/ml Klebsiella pneumoniae  --  Klebsiella pneumoniae      .Blood Blood-Peripheral  11-25-22   Growth in anaerobic bottle: Morganella morganii  See previous culture [id number]  --  Blood Culture PCR  Morganella morganii    Ferritin, Serum: 264 (12-08)      D-Dimer Assay, Quantitative: 1757 (12-14)      SARS-CoV-2 Result: Detected (12-13-22 @ 13:55)    SARS-CoV-2: NotDetec (25 Nov 2022 01:46)  COVID-19 PCR: NotDetec (19 Jul 2022 05:41)  COVID-19 PCR: NotDetec (13 Jul 2022 07:03)  SARS-CoV-2: NotDetec (06 Jul 2022 15:07)    RADIOLOGY:

## 2022-12-14 NOTE — PROGRESS NOTE ADULT - SUBJECTIVE AND OBJECTIVE BOX
[facility] NEPHROLOGY SERVICES, [facility]  NEPHROLOGY AND HYPERTENSION  [address]  [address]  [place] [address]  [phone]    MD HALIE OLIVIA, MD DHRUV REMY MD YELENA ROSENBERG, MD BINNY KOSHY, MD CHRISTOPHER CAPUTO, MD JANICE ARREOLA MD          Patient events noted  No distress    MEDICATIONS  (STANDING):  chlorhexidine 2% Cloths 1 Application(s) Topical <User Schedule>  dextrose 5% + sodium chloride 0.45%. 1000 milliLiter(s) (75 mL/Hr) IV Continuous <Continuous>  heparin   Injectable 5000 Unit(s) SubCutaneous every 12 hours  pantoprazole    Tablet 40 milliGRAM(s) Oral before breakfast  piperacillin/tazobactam IVPB.. 3.375 Gram(s) IV Intermittent every 12 hours  rifAXIMin 550 milliGRAM(s) Oral two times a day    MEDICATIONS  (PRN):  calcium carbonate    500 mG (Tums) Chewable 1 Tablet(s) Chew every 6 hours PRN Upset Stomach  morphine  - Injectable 2 milliGRAM(s) IV Push every 6 hours PRN Severe Pain (7 - 10)  sodium chloride 0.9% lock flush 10 milliLiter(s) IV Push every 1 hour PRN Pre/post blood products, medications, blood draw, and to maintain line patency      PHYSICAL EXAM:      T(C): 36.7 (12-14-22 @ 15:55), Max: 37.1 (12-13-22 @ 21:10)  HR: 96 (12-14-22 @ 15:55) (96 - 113)  BP: 129/73 (12-14-22 @ 15:55) (107/69 - 134/79)  RR: 18 (12-14-22 @ 15:55) (16 - 18)  SpO2: 100% (12-14-22 @ 15:55) (98% - 100%)  Wt(kg): --  Lungs clear  Heart S1S2  Abd soft NT ND  Extremities:   tr edema                                    8.9    12.25 )-----------( 188      ( 14 Dec 2022 06:22 )             27.5     12-14    146<H>  |  116<H>  |  67<H>  ----------------------------<  78  3.7   |  20<L>  |  5.42<H>    Ca    7.8<L>      14 Dec 2022 06:22    TPro  5.2<L>  /  Alb  1.5<L>  /  TBili  0.3  /  DBili  x   /  AST  13<L>  /  ALT  40  /  AlkPhos  223<H>  12-14      LIVER FUNCTIONS - ( 14 Dec 2022 06:22 )  Alb: 1.5 g/dL / Pro: 5.2 gm/dL / ALK PHOS: 223 U/L / ALT: 40 U/L / AST: 13 U/L / GGT: x           Creatinine Trend: 5.42<--, 5.22<--, 5.21<--, 5.65<--, 5.98<--, 5.85<--        Assessment   YUNIEL CKD 4-5  ATN; nonoliguric; stable   Acute on chronic anemia   COVID +     Plan:    Retacrit IV Fe given  Stool guaiac pending   Supportive care  No immediate indication for HD will require close outpatient follow up    Austin Dukes MD

## 2022-12-15 NOTE — CONSULT NOTE ADULT - ASSESSMENT
Pt is a 63 yo M w/ PMHx benign esophageal stricture s/p fully covered esophageal stent placement 4/19/22 c/b stent migration w/ repeat EDGD 4/29/22 for stricture repositioning and suturing, HCV, chronic pancreatitis, emphysema presenting as transfer from [facility] with nausea/vomiting. Patient hospitalized there 11/25-12/14 for septic shock 2/2 pseudomonal PNA, lung abscess, Morganella bacteremia, Klebsiella UTI. Pt required intubation for RF and was on pressors. Pt's hospital course was complicated by YUNIEL due to septic ATN requiring 3 rounds of HD. Pt is on now extubated, on 4L of NC, off pressors. Pt developed intractable N/V during his hospital stay and was transferred to [facility] for evaluation of esophageal stent removal.  Pt was also found to be COVID + on 12/13/22. Patient was also noted to have anemia with Hgb in 6s. S/p 2U pRBC on 11/25 and 2U on 12/13. Patient w/ no overt bleeding at outside hospital.     #Anemia, normocytic. Hgb 6.2 on 12/13, increased to 8.9 after 2U pRBCs. No overt GIB noted in [facility].   #Epigastric abdominal pain, likely 2/2 chronic pancreatitis. Lipase 11 on 12/13, in 400s at time of admission to [facility] on 11/25. CT A/P 12/5 shows diffuse calcifications throughout the pancreas consistent with chronic calcific pancreatitis.  #Esophageal stricture: Patient with hx of esophageal stricture requiring stent placement on 4/19/2022. Post-procedural course c/b hematemesis with stent malposition requiring EGD for stent repositioning and suture on 4/29/2022 with sloughing of esophageal mucosa noted.    #Nausea/vomiting: Pt denies n/v currently, however noted at OSH. CT A/P w/ po contrast on 12/5 shows esophageal stent in place partially filled with debris/ingested material.  #PNA, pulmonary abscess: Patient on zosyn, supplemental O2 via NC  #HCV w/ SVR. Liver normal appearing on CT  #Acute renal failure    Recommendations  - please obtain Xray esophagram  - can attempt trial of CLD if able to tolerate, however low bar for keeping NPO    - optimize pulmonary status  - please keep NPO after MN for possible procedure tomorrow, clean out of esophageal stent +/- exchange  - protonix 40 mg IV bid  - trend CBC, keep active T&S, transfuse for Hgb>7    All recommendations are tentative until note is attested by attending.     Stacey Brennan, PGY5  Gastroenterology/Hepatology Fellow  Available on [employer] Teams  [phone] ([employer] Short Range Pager)  [phone] (Long Range Pager)    After 5pm, please contact the on-call GI fellow. [phone]     Pt is a 63 yo M w/ PMHx benign esophageal stricture s/p fully covered esophageal stent placement 4/19/22 c/b stent migration w/ repeat EDGD 4/29/22 for stricture repositioning and suturing, HCV, chronic pancreatitis, emphysema presenting as transfer from [facility] with nausea/vomiting. Patient hospitalized there 11/25-12/14 for septic shock 2/2 pseudomonal PNA, lung abscess, Morganella bacteremia, Klebsiella UTI. Pt required intubation for RF and was on pressors. Pt's hospital course was complicated by YUNIEL due to septic ATN requiring 3 rounds of HD. Pt is on now extubated, on 4L of NC, off pressors. Pt developed intractable N/V during his hospital stay and was transferred to [facility] for evaluation of esophageal stent removal.  Pt was also found to be COVID + on 12/13/22. Patient was also noted to have anemia with Hgb in 6s. S/p 2U pRBC on 11/25 and 2U on 12/13. Patient w/ no overt bleeding at outside hospital.     #Anemia, normocytic. Hgb 6.2 on 12/13, increased to 8.9 after 2U pRBCs. No overt GIB noted in [facility].   #Epigastric abdominal pain, likely 2/2 chronic pancreatitis. Lipase 11 on 12/13, in 400s at time of admission to [facility] on 11/25. CT A/P 12/5 shows diffuse calcifications throughout the pancreas consistent with chronic calcific pancreatitis.  #Esophageal stricture: Patient with hx of esophageal stricture requiring stent placement on 4/19/2022. Post-procedural course c/b hematemesis with stent malposition requiring EGD for stent repositioning and suture on 4/29/2022 with sloughing of esophageal mucosa noted.    #Nausea/vomiting: Pt denies n/v currently, however noted at OSH. CT A/P w/ po contrast on 12/5 shows esophageal stent in place partially filled with debris/ingested material.  #PNA, pulmonary abscess: Patient on zosyn, supplemental O2 via NC  #HCV w/ SVR. Liver normal appearing on CT  #Acute renal failure    Recommendations  - please obtain Xray esophagram  - can attempt trial of CLD if able to tolerate, however low bar for keeping NPO    - optimize pulmonary status  - please keep NPO after MN for possible procedure tomorrow, clean out of esophageal stent +/- exchange  - please obtain 4 am CBC, CMP and transfuse as needed, replete electrolytes  - protonix 40 mg IV bid  - trend CBC, keep active T&S, transfuse for Hgb>7    All recommendations are tentative until note is attested by attending.     Stacey Brennan, PGY5  Gastroenterology/Hepatology Fellow  Available on [employer] Teams  [phone] ([employer] Short Range Pager)  [phone] (Long Range Pager)    After 5pm, please contact the on-call GI fellow. [phone]

## 2022-12-15 NOTE — H&P ADULT - PROBLEM SELECTOR PLAN 11
DVT ppx: Heparin subq  Diet: NPO until GI eval DVT ppx: Heparin subq (switch over to Lovenox 0.5mg/kg sq BID once weight is recorded- due to COVID +)   Diet: NPO until GI eval DVT ppx: Heparin subq   Diet: NPO until GI eval

## 2022-12-15 NOTE — H&P ADULT - NSHPADDITIONALINFOADULT_GEN_ALL_CORE
Denies chest pain, endorses epigastric pain   TTE on 11/25: Grossly preserved left and right ventricular systolic function.  RCRI : Class II risk for low-intermediate procedure   Pt can proceed with planned GI procedure without further testing Denies chest pain, endorses epigastric pain   TTE on 11/25: Grossly preserved left and right ventricular systolic function.  RCRI : Class II risk for low-intermediate procedure   Patient is moderate risk given his recent extensive hospital course for low-intermediate procedure   Pt can proceed with planned GI procedure without further testing

## 2022-12-15 NOTE — H&P ADULT - PROBLEM SELECTOR PLAN 1
Hx of esophageal stricture (s/p stent on 5/2022 by Dr. Hernandez?)   intractable N/V, transferred to [facility] for esophageal stent removal by Dr. Erickson   -Zofran PRN - Follow up with ECG for QTc, last one on 11/27 Qtc was: 487  -GI Consulted  -Tramadol PRN for pain , if unable to tolerate low dose Dilaudid 1mg q8h PRN (allergy to Tylenol? and has CKD)  -NPO until GI evaluation

## 2022-12-15 NOTE — H&P ADULT - NSHPREVIEWOFSYSTEMS_GEN_ALL_CORE
REVIEW OF SYSTEMS:    CONSTITUTIONAL:  +weakness and chills   EYES/ENT:  No visual changes;  No vertigo or throat pain   NECK:  No pain or stiffness  RESPIRATORY:  No cough, wheezing, hemoptysis; No shortness of breath  CARDIOVASCULAR:  No chest pain or palpitations  GASTROINTESTINAL:  + abdominal/ epigastric pain. No nausea, vomiting, or hematemesis; No diarrhea or constipation. No melena or hematochezia.  GENITOURINARY:  No dysuria, frequency or hematuria  MUSCULOSKELETAL:  FROM all extremities, normal strength, No calf tenderness  NEUROLOGICAL:  No numbness or weakness  SKIN:  No itching, rashes

## 2022-12-15 NOTE — H&P ADULT - PROBLEM SELECTOR PLAN 2
Resolved  Septic shock due to psuedomonal PNA, Lung abscess, morganella bacteremia and kelbsiella UTI  On Zosyn since 11/25, as per ID in [facility] - continue with Zosyn while inpatient  - upon discharge change abx to po Ceftin x 4 weeks total

## 2022-12-15 NOTE — CONSULT NOTE ADULT - SUBJECTIVE AND OBJECTIVE BOX
HPI:  Pt is a 63 yo M w/ PMHx benign esophageal stricture s/p fully covered esophageal stent placement 4/19/22 c/b stent migration w/ repeat EDGD 4/29/22 for stricture repositioning and suturing, HCV, chronic pancreatitis, emphysema presenting as [facility] with [facility]/vomiting. Patient hospitalized there 11/25-12/14 for septic shock 2/2 pseudomonal PNA, lung abscess, Morganella bacteremia, Klebsiella UTI. Pt required intubation for RF and was on pressors. Pt's hospital course was complicated by YUNIEL due to septic ATN requiring 3 rounds of HD. Pt is on now extubated, on 4L of NC, off pressors. Pt developed intractable N/V during his hospital stay and was transferred to [facility] for evaluation of esophageal stent removal.  Pt was also found to be COVID + on 12/13/22. Patient was also noted to have anemia with Hgb in 6s. S/p 2U pRBC on 11/25 and 2U on 12/13. Patient w/ no overt bleeding at outside hospital.   At time of exam, patient endorsing chronic epigastric abdominal pain. Patient denies nausea, vomiting, hematemesis, hematochezia, dysphagia, odynophagia. Tolerating his secretions. States he had normal brown stool prior to tx. Pt states that he tolerates regular food, however noted to have nausea/vomiting on documentation from OSH.      Allergies:  Tylenol (Unknown)    Home Medications:  bisacodyl 5 mg oral delayed release tablet: 1 tab(s) orally once a day (at bedtime) (06 Jul 2022 17:55)  MiraLax oral powder for reconstitution: 17 gram(s) orally 2 times a day (06 Jul 2022 17:55)  oxyCODONE 5 mg oral tablet: 3 tab(s) orally every 6 hours (05 Sep 2022 13:41)  thiamine 100 mg oral tablet: 1 tab(s) orally once a day (06 Jul 2022 17:55)    Hospital Medications:  bisacodyl 5 milliGRAM(s) Oral at bedtime  cyclobenzaprine 5 milliGRAM(s) Oral three times a day PRN  folic acid 1 milliGRAM(s) Oral daily  guaiFENesin  milliGRAM(s) Oral every 12 hours  heparin   Injectable 5000 Unit(s) SubCutaneous every 8 hours  HYDROmorphone   Tablet 1 milliGRAM(s) Oral every 8 hours PRN  NIFEdipine XL 30 milliGRAM(s) Oral daily  ondansetron Injectable 4 milliGRAM(s) IV Push every 8 hours PRN  pantoprazole    Tablet 40 milliGRAM(s) Oral before breakfast  piperacillin/tazobactam IVPB. 3.375 Gram(s) IV Intermittent once  piperacillin/tazobactam IVPB.- 3.375 Gram(s) IV Intermittent once  thiamine 100 milliGRAM(s) Oral daily  traMADol 25 milliGRAM(s) Oral every 8 hours PRN      PMHX/PSHX:  ETOH abuse    Pancreatitis    Hepatitis C    Gout    HTN (hypertension)    Chronic kidney disease (CKD)    H/O chronic pancreatitis    Gout    Alcohol abuse    No significant past surgical history    Gastric perforation    Gastric perforation        Family history:  No pertinent family history in first degree relatives    FH: HTN (hypertension)    FH: hypertension (Father)        Denies family history of colon cancer/polyps, stomach cancer/polyps, pancreatic cancer/masses, liver cancer/disease, ovarian cancer and endometrial cancer.    Social History:   Tob: Denies  EtOH: Denies  Illicit Drugs: Denies    ROS:   General:  No wt loss, fevers, chills, night sweats, fatigue  Eyes:  Good vision, no reported pain  ENT:  No sore throat, pain, runny nose, dysphagia  CV:  No pain, palpitations, hypo/hypertension  Pulm:  No dyspnea, cough, tachypnea, wheezing  GI:  see HPI  :  No pain, bleeding, incontinence, nocturia  Muscle:  No pain, weakness  Neuro:  No weakness, tingling, memory problems  Psych:  No fatigue, insomnia, mood problems, depression  Endocrine:  No polyuria, polydipsia, cold/heat intolerance  Heme:  No petechiae, ecchymosis, easy bruisability  Skin:  No rash, tattoos, scars, edema    PHYSICAL EXAM:   GENERAL: No acute distress. Thin.  HEENT:  NCAT, no scleral icterus   CHEST:  no respiratory distress, coughing. On NC  HEART:  Regular rate and rhythm  ABDOMEN:  Soft, diffusely TTP. Large ventral scar  EXTREMITIES: No edema  SKIN:  No rash/erythema/ecchymoses/petechiae/wounds/abscess/warm/dry  NEURO:  Alert and oriented x 3, no asterixis    Vital Signs:  Vital Signs Last 24 Hrs  T(C): 37 (15 Dec 2022 05:00), Max: 37 (15 Dec 2022 05:00)  T(F): 98.6 (15 Dec 2022 05:00), Max: 98.6 (15 Dec 2022 05:00)  HR: 98 (15 Dec 2022 05:00) (96 - 100)  BP: 162/88 (15 Dec 2022 05:00) (129/73 - 162/88)  BP(mean): --  RR: 18 (15 Dec 2022 05:00) (18 - 18)  SpO2: 99% (15 Dec 2022 05:00) (99% - 100%)      Daily     Daily     LABS:                        8.9    12.25 )-----------( 188      ( 14 Dec 2022 06:22 )             27.5       12-14    146<H>  |  116<H>  |  67<H>  ----------------------------<  78  3.7   |  20<L>  |  5.42<H>    Ca    7.8<L>      14 Dec 2022 06:22    TPro  5.2<L>  /  Alb  1.5<L>  /  TBili  0.3  /  DBili  x   /  AST  13<L>  /  ALT  40  /  AlkPhos  223<H>  12-14    LIVER FUNCTIONS - ( 14 Dec 2022 06:22 )  Alb: 1.5 g/dL / Pro: 5.2 gm/dL / ALK PHOS: 223 U/L / ALT: 40 U/L / AST: 13 U/L / GGT: x                                       8.9    12.25 )-----------( 188      ( 14 Dec 2022 06:22 )             27.5                         6.2    9.23  )-----------( 208      ( 13 Dec 2022 10:45 )             20.0       Imaging:  ACC: [id number] EXAM:  CT CHEST                        PROCEDURE DATE:  12/07/2022    INTERPRETATION:  CLINICAL INFORMATION: Cavitary lung lesion    COMPARISON: 8/30/2022, 11/25/2022.    CONTRAST/COMPLICATIONS:  IV Contrast: NONE  Oral Contrast: NONE  Complications: None reported at time of study completion    PROCEDURE:  CT of the Chest was performed.  Sagittal and coronal reformats were performed.    FINDINGS:    LUNGS AND AIRWAYS: Small amount of mucus within the distal trachea.    There is a 3.8 x 2.1 x 3.1 cm cavitary consolidation in the right upper   lobe (previously 4.5 x 3.3 x 4.7 cm), with decreased amount of air within   the lesion compared to prior. Mild paraseptal emphysema at the lung   apices.  PLEURA: Trace bilateral pleural effusions with associated compressive   atelectasis.  MEDIASTINUM AND LAKE: Proximal to mid esophagus is mildly distended with   air and fluid. There is a distal esophageal stent.  VESSELS: Within normal limits.  HEART: Heart size is normal. Nopericardial effusion.  CHEST WALL AND LOWER NECK: Within normal limits.  VISUALIZED UPPER ABDOMEN: Large amount of inspissated material within the   stomach. Large amount stool within the colon. Extensive pancreatic   calcifications compatible with chronic pancreatitis.  BONES: Within normal limits.    IMPRESSION:  Interval decrease in size of the cavitary consolidation in the right   upper lobe, which now measures 3.8 x 2.1 x 3.1 cm. Decreased amount of   air within the consolidation compared to11/25/2022.    Trace bilateral pleural effusions with associated atelectasis.    Additional findings as above.    --- End of Report ---      KURT SPAIN MD; Attending Radiologist  This document has been electronically signed. Dec  7 2022  2:18PM             HPI:  Pt is a 61 yo M w/ PMHx benign esophageal stricture s/p fully covered esophageal stent placement 4/19/22 c/b stent migration w/ repeat EDGD 4/29/22 for stricture repositioning and suturing, HCV, chronic pancreatitis, emphysema [facility]. Patient hospitalized there 11/25-12/14 for septic shock 2/2 pseudomonal PNA, lung abscess, Morganella bacteremia, Klebsiella UTI. Pt required intubation for RF and was on pressors. Pt's hospital course was complicated by YUNIEL due to septic ATN requiring 3 rounds of HD. Pt is on now extubated, on 4L of NC, off pressors. Pt developed intractable N/V during his hospital stay and was transferred to [facility] for evaluation of esophageal stent removal.  Pt was also found to be COVID + on 12/13/22. Patient was also noted to have anemia with Hgb in 6s. S/p 2U pRBC on 11/25 and 2U on 12/13. Patient w/ no overt bleeding at outside hospital.   At time of exam, patient endorsing chronic epigastric abdominal pain. Patient denies nausea, vomiting, hematemesis, hematochezia, dysphagia, odynophagia. Tolerating his secretions. States he had normal brown stool prior to tx. Pt states that he tolerates regular food, however noted to have nausea/vomiting on documentation from OSH.      Allergies:  Tylenol (Unknown)    Home Medications:  bisacodyl 5 mg oral delayed release tablet: 1 tab(s) orally once a day (at bedtime) (06 Jul 2022 17:55)  MiraLax oral powder for reconstitution: 17 gram(s) orally 2 times a day (06 Jul 2022 17:55)  oxyCODONE 5 mg oral tablet: 3 tab(s) orally every 6 hours (05 Sep 2022 13:41)  thiamine 100 mg oral tablet: 1 tab(s) orally once a day (06 Jul 2022 17:55)    Hospital Medications:  bisacodyl 5 milliGRAM(s) Oral at bedtime  cyclobenzaprine 5 milliGRAM(s) Oral three times a day PRN  folic acid 1 milliGRAM(s) Oral daily  guaiFENesin  milliGRAM(s) Oral every 12 hours  heparin   Injectable 5000 Unit(s) SubCutaneous every 8 hours  HYDROmorphone   Tablet 1 milliGRAM(s) Oral every 8 hours PRN  NIFEdipine XL 30 milliGRAM(s) Oral daily  ondansetron Injectable 4 milliGRAM(s) IV Push every 8 hours PRN  pantoprazole    Tablet 40 milliGRAM(s) Oral before breakfast  piperacillin/tazobactam IVPB. 3.375 Gram(s) IV Intermittent once  piperacillin/tazobactam IVPB.- 3.375 Gram(s) IV Intermittent once  thiamine 100 milliGRAM(s) Oral daily  traMADol 25 milliGRAM(s) Oral every 8 hours PRN    PMHX/PSHX:  ETOH abuse    Pancreatitis    Hepatitis C    Gout    HTN (hypertension)    Chronic kidney disease (CKD)    H/O chronic pancreatitis    Gout    Alcohol abuse    No significant past surgical history    Gastric perforation    Gastric perforation        Family history:  No pertinent family history in first degree relatives    FH: HTN (hypertension)    FH: hypertension (Father)        Denies family history of colon cancer/polyps, stomach cancer/polyps, pancreatic cancer/masses, liver cancer/disease, ovarian cancer and endometrial cancer.    Social History:   Tob: Denies  EtOH: Denies  Illicit Drugs: Denies    ROS:   General:  No wt loss, fevers, chills, night sweats, fatigue  Eyes:  Good vision, no reported pain  ENT:  No sore throat, pain, runny nose, dysphagia  CV:  No pain, palpitations, hypo/hypertension  Pulm:  No dyspnea, cough, tachypnea, wheezing  GI:  see HPI  :  No pain, bleeding, incontinence, nocturia  Muscle:  No pain, weakness  Neuro:  No weakness, tingling, memory problems  Psych:  No fatigue, insomnia, mood problems, depression  Endocrine:  No polyuria, polydipsia, cold/heat intolerance  Heme:  No petechiae, ecchymosis, easy bruisability  Skin:  No rash, tattoos, scars, edema    PHYSICAL EXAM:   GENERAL: No acute distress. Thin.  HEENT:  NCAT, no scleral icterus   CHEST:  no respiratory distress, coughing. On NC  HEART:  Regular rate and rhythm  ABDOMEN:  Soft, diffusely TTP. Large ventral scar  EXTREMITIES: No edema  SKIN:  No rash/erythema/ecchymoses/petechiae/wounds/abscess/warm/dry  NEURO:  Alert and oriented x 3, no asterixis    Vital Signs:  Vital Signs Last 24 Hrs  T(C): 37 (15 Dec 2022 05:00), Max: 37 (15 Dec 2022 05:00)  T(F): 98.6 (15 Dec 2022 05:00), Max: 98.6 (15 Dec 2022 05:00)  HR: 98 (15 Dec 2022 05:00) (96 - 100)  BP: 162/88 (15 Dec 2022 05:00) (129/73 - 162/88)  BP(mean): --  RR: 18 (15 Dec 2022 05:00) (18 - 18)  SpO2: 99% (15 Dec 2022 05:00) (99% - 100%)      Daily     Daily     LABS:                        8.9    12.25 )-----------( 188      ( 14 Dec 2022 06:22 )             27.5       12-14    146<H>  |  116<H>  |  67<H>  ----------------------------<  78  3.7   |  20<L>  |  5.42<H>    Ca    7.8<L>      14 Dec 2022 06:22    TPro  5.2<L>  /  Alb  1.5<L>  /  TBili  0.3  /  DBili  x   /  AST  13<L>  /  ALT  40  /  AlkPhos  223<H>  12-14    LIVER FUNCTIONS - ( 14 Dec 2022 06:22 )  Alb: 1.5 g/dL / Pro: 5.2 gm/dL / ALK PHOS: 223 U/L / ALT: 40 U/L / AST: 13 U/L / GGT: x                                       8.9    12.25 )-----------( 188      ( 14 Dec 2022 06:22 )             27.5                         6.2    9.23  )-----------( 208      ( 13 Dec 2022 10:45 )             20.0       Imaging:  ACC: [id number] EXAM:  CT CHEST                        PROCEDURE DATE:  12/07/2022    INTERPRETATION:  CLINICAL INFORMATION: Cavitary lung lesion    COMPARISON: 8/30/2022, 11/25/2022.    CONTRAST/COMPLICATIONS:  IV Contrast: NONE  Oral Contrast: NONE  Complications: None reported at time of study completion    PROCEDURE:  CT of the Chest was performed.  Sagittal and coronal reformats were performed.    FINDINGS:    LUNGS AND AIRWAYS: Small amount of mucus within the distal trachea.    There is a 3.8 x 2.1 x 3.1 cm cavitary consolidation in the right upper   lobe (previously 4.5 x 3.3 x 4.7 cm), with decreased amount of air within   the lesion compared to prior. Mild paraseptal emphysema at the lung   apices.  PLEURA: Trace bilateral pleural effusions with associated compressive   atelectasis.  MEDIASTINUM AND LAKE: Proximal to mid esophagus is mildly distended with   air and fluid. There is a distal esophageal stent.  VESSELS: Within normal limits.  HEART: Heart size is normal. Nopericardial effusion.  CHEST WALL AND LOWER NECK: Within normal limits.  VISUALIZED UPPER ABDOMEN: Large amount of inspissated material within the   stomach. Large amount stool within the colon. Extensive pancreatic   calcifications compatible with chronic pancreatitis.  BONES: Within normal limits.    IMPRESSION:  Interval decrease in size of the cavitary consolidation in the right   upper lobe, which now measures 3.8 x 2.1 x 3.1 cm. Decreased amount of   air within the consolidation compared to11/25/2022.    Trace bilateral pleural effusions with associated atelectasis.    Additional findings as above.    --- End of Report ---      KURT SPAIN MD; Attending Radiologist  This document has been electronically signed. Dec  7 2022  2:18PM

## 2022-12-15 NOTE — H&P ADULT - HISTORY OF PRESENT ILLNESS
62 year old male with Hx of esophageal stricture (s/p stent on 5/2022 by Dr. Hernandez?), Emphysema, HCV, chronic pancreatitis, presenting from [facility] for esophageal stent removal due to intractable n/v. Pt was admitted to [facility] on 11/25 for AMS 2/2 Septic shock due to psuedomonal PNA, Lung abscess, morganella bacteremia and kelbsiella UTI. Pt was initially intubated for acute respiratory failure and required pressors for hypotension . Pt's hospital course was complicated by YUNIEL due to septic ATN requiring 3 rounds of HD. Pt is on now extubated, on 4L of NC, off pressors. Pt developed intractable N/V during his hospital stay and was transferred to [facility] for esophageal stent removal by Dr. Erickson.  Pt was also found to be COVID + on 12/13/22.   Pt alert, awake, AOx3 when seen. Pt states he feels cold and is aggravated. Pt demanding his pain medications for esophageal and body aches.   Denies chest pain, shortness of breath.            62 year old male with Hx of esophageal stricture (s/p stent on 5/2022 by Dr. Hernandez?), Emphysema, HCV, chronic pancreatitis, presenting from [facility] for esophageal stent removal due to intractable n/v. Pt was admitted to [facility] on 11/25 for AMS 2/2 Septic shock due to psuedomonal PNA, Lung abscess, morganella bacteremia and kelbsiella UTI. Pt was initially intubated for acute respiratory failure and required pressors for hypotension . Pt's hospital course was complicated by YUNIEL due to septic ATN requiring 3 rounds of HD. Pt is on now extubated, on 4L of NC, off pressors. Pt developed intractable N/V during his hospital stay and was transferred to [facility] for esophageal stent removal by Dr. Erickson.  Pt was also found to be COVID + on 12/13/22.   Pt alert, awake, AOx3 when seen. Pt states he feels cold and is aggravated. Pt demanding his pain medications for esophageal and body aches.   Denies chest pain, shortness of breath.

## 2022-12-15 NOTE — H&P ADULT - PROBLEM SELECTOR PLAN 4
S/p HD X 3  in ICU in [facility]   Cr plateaued , off HD now  Avoid nephrotoxic, no contrast , no NSAID  Renally dose medications   Consult Nephro in am S/p HD X 3  in ICU in [facility]   Cr plateaued , off HD now  Avoid nephrotoxic, no contrast , no NSAID  Renally dose medications   Consult Nephro in am    #Hypernatremia:   monitor cmp     #anemia of chronic disease  [facility] and s/p Retacrit IV Fe

## 2022-12-15 NOTE — H&P ADULT - PROBLEM SELECTOR PLAN 3
COVID + on 12/13   Monitor O2 saturation, Maintain O2 saturation > 95%, O2 PRN   Pt has rhonchi on exam likely from lung abscess- Chest PT BID, Mucinex BID  monitor for symptoms  As per ID in [facility] on 12/14- No role for covid 19 treatment at this time. COVID + on 12/13   Monitor O2 saturation, Maintain O2 saturation > 95%, O2 PRN   Pt has rhonchi on exam likely from lung abscess- Chest PT BID, Mucinex BID  monitor for symptoms  As per ID in [facility] on 12/14- No role for covid 19 treatment at this time.  D-dimer elevated to 1757, however it is decreased from 11/30 when it was 3160- Consider switching to Lovenox 0.5mg/kg sq BID instead of heparin subq once weight is recorded

## 2022-12-15 NOTE — H&P ADULT - NSHPLABSRESULTS_GEN_ALL_CORE
LABS:                          8.9    12.25 )-----------( 188      ( 14 Dec 2022 06:22 )             27.5     12-14    146<H>  |  116<H>  |  67<H>  ----------------------------<  78  3.7   |  20<L>  |  5.42<H>    Ca    7.8<L>      14 Dec 2022 06:22    TPro  5.2<L>  /  Alb  1.5<L>  /  TBili  0.3  /  DBili  x   /  AST  13<L>  /  ALT  40  /  AlkPhos  223<H>  12-14      CT Chest : Interval decrease in size of the cavitary consolidation in the right upper lobe, which now measures 3.8 x 2.1 x 3.1 cm. Decreased amount of   air within the consolidation compared to11/25/2022    CTAP: 1.  Esophageal stent in place which is partially filled with   debris/ingested material.  2.  The stomach is collapsed. There is gastric wall thickening with   submucosal edema. Probable gastritis.  3.  Chronic calcific pancreatitis.  4.  Large amount of stool throughout the colon consistent with   obstipation/constipation.

## 2022-12-15 NOTE — H&P ADULT - NSHPPHYSICALEXAM_GEN_ALL_CORE
VITAL SIGNS:  T(F): 98.6 (12-15-22 @ 05:00), Max: 98.6 (12-15-22 @ 05:00)  HR: 98 (12-15-22 @ 05:00) (96 - 100)  BP: 162/88 (12-15-22 @ 05:00) (129/73 - 162/88)  BP(mean): --  RR: 18 (12-15-22 @ 05:00) (18 - 18)  SpO2: 99% (12-15-22 @ 05:00) (99% - 100%)    PHYSICAL EXAM:    Constitutional: resting comfortably in bed; NAD, on NC   HEENT: NC/AT, PERRL, EOMI, anicteric sclera, no nasal discharge; uvula midline, no oropharyngeal erythema or exudates; MMM  Neck: supple; no JVD or thyromegaly  Respiratory: CTA B/L; rhonchi b/l   Cardiac: +S1/S2; RRR; no M/R/G; PMI non-displaced  Gastrointestinal: large midline surgical scar, soft, NT/ND; no rebound or guarding; +BSx4  Back: spine midline, no bony tenderness or step-offs; no CVAT B/L  Extremities: WWP, no clubbing or cyanosis; no peripheral edema  Musculoskeletal: NROM x4; no joint swelling, tenderness or erythema  Dermatologic: skin warm, dry and intact; no rashes, wounds, or scars  Lymphatic: no submandibular or cervical LAD  Neurologic: AAOx3; CNII-XII grossly intact; no focal deficits  Psychiatric: affect and characteristics of appearance, verbalizations, behaviors are appropriate, denies SI/HI/AH/VH

## 2022-12-15 NOTE — H&P ADULT - ASSESSMENT
62 year old male with Hx of esophageal stricture (s/p stent on 5/2022 by Dr. Hernandez?), Emphysema, HCV, chronic pancreatitis, presenting from [facility] for esophageal stent removal due to intractable n/v. Admitted to [facility] from 11/25-12/14 for ams 2/2 Septic shock due to psuedomonal PNA, Lung abscess, morganella bacteremia and kelbsiella UTI c/b ATN requiring HD and intractable n/v.

## 2022-12-15 NOTE — H&P ADULT - PROBLEM SELECTOR PLAN 5
Patient will need serial imaging of his chest to resolution or stability of radiographic findings and four more weeks of abx  continue with Zosyn while inpatient  - upon discharge change abx to po Ceftin x 4 weeks total  monitor fever and wbc curve

## 2022-12-15 NOTE — PATIENT PROFILE ADULT - FALL HARM RISK - HARM RISK INTERVENTIONS

## 2022-12-16 NOTE — PROGRESS NOTE ADULT - ASSESSMENT
62 year old male with Hx of esophageal stricture (s/p stent on 5/2022 by Dr. Hernandez?), Emphysema, HCV, chronic pancreatitis, presenting from [facility] for esophageal stent removal due to intractable n/v. Admitted to [facility] from 11/25-12/14 for ams 2/2 Septic shock due to psuedomonal PNA, Lung abscess, morganella bacteremia and kelbsiella UTI c/b ATN requiring HD and intractable n/v.

## 2022-12-16 NOTE — PROGRESS NOTE ADULT - PROBLEM SELECTOR PLAN 1
Hx of esophageal stricture (s/p stent on 5/2022 by Dr. Hernandez?)   intractable N/V, transferred to [facility] for esophageal stent removal by Dr. Erickson - performed showing severe esophagitis  - PPI   - Carafate  -Zofran PRN - Follow up with ECG for QTc, last one on 11/27 Qtc was: 487  -GI Consulted  -Tramadol PRN for pain , if unable to tolerate low dose Dilaudid 1mg q8h PRN (allergy to Tylenol? and has CKD)  - Clear liquid diet for now

## 2022-12-16 NOTE — PROGRESS NOTE ADULT - PROBLEM SELECTOR PLAN 4
S/p HD X 3  in ICU in [facility]   Cr plateaued , off HD now  Avoid nephrotoxic, no contrast , no NSAID  Renally dose medications   Consult Nephro in am    #Hypernatremia:   monitor cmp     #anemia of chronic disease  [facility]/p Retacrit IV Fe

## 2022-12-16 NOTE — PROGRESS NOTE ADULT - PROBLEM SELECTOR PLAN 3
COVID + on 12/13   Monitor O2 saturation, Maintain O2 saturation > 95%, O2 PRN   Pt has rhonchi on exam likely from lung abscess- Chest PT BID, Mucinex BID  monitor for symptoms  As per ID in [facility] on 12/14- No role for covid 19 treatment at this time.  D-dimer elevated to 1757, however it is decreased from 11/30 when it was 3160- Consider switching to Lovenox 0.5mg/kg sq BID instead of heparin subq once weight is recorded

## 2022-12-16 NOTE — PROGRESS NOTE ADULT - SUBJECTIVE AND OBJECTIVE BOX
Hospitalist Progress Note  Yessy Montelongo MD Pager # [phone]    OVERNIGHT EVENTS: YOSSI    SUBJECTIVE / INTERVAL HPI: Patient seen and examined at bedside. Patient reports that he has pain but he cannot localize the pain. He is also asking for liquids.     VITAL SIGNS:  Vital Signs Last 24 Hrs  T(C): 36.2 (16 Dec 2022 12:01), Max: 37.1 (15 Dec 2022 17:46)  T(F): 97.2 (16 Dec 2022 12:01), Max: 98.7 (15 Dec 2022 17:46)  HR: 75 (16 Dec 2022 12:01) (75 - 95)  BP: 139/78 (16 Dec 2022 12:01) (131/72 - 147/80)  BP(mean): --  RR: 16 (16 Dec 2022 12:01) (16 - 19)  SpO2: 96% (16 Dec 2022 12:01) (96% - 100%)    Parameters below as of 16 Dec 2022 12:01  Patient On (Oxygen Delivery Method): room air        PHYSICAL EXAM:    General: Cachetic appearing male resting in bed   HEENT: NC/AT; PERRL, anicteric sclera; MMM  Neck: supple  Cardiovascular: +S1/S2; RRR  Respiratory: diffuse rhonchi bilaterally - no respiratory distress or accessory muscle use - breathing comfortably   Gastrointestinal: soft, NT/ND; +BSx4  Extremities: WWP; no edema, clubbing or cyanosis  Vascular: 2+ radial, DP/PT pulses B/L  Neurological: AAOx3; no focal deficits    MEDICATIONS:  MEDICATIONS  (STANDING):  benzocaine 15 mG/menthol 3.6 mG Lozenge 1 Lozenge Oral once  bisacodyl 5 milliGRAM(s) Oral at bedtime  folic acid 1 milliGRAM(s) Oral daily  guaiFENesin  milliGRAM(s) Oral every 12 hours  heparin   Injectable 5000 Unit(s) SubCutaneous every 8 hours  magnesium sulfate  IVPB 1 Gram(s) IV Intermittent once  NIFEdipine XL 30 milliGRAM(s) Oral daily  pantoprazole  Injectable 40 milliGRAM(s) IV Push two times a day  piperacillin/tazobactam IVPB.. 3.375 Gram(s) IV Intermittent every 12 hours  sucralfate 1 Gram(s) Oral every 6 hours  thiamine 100 milliGRAM(s) Oral daily    MEDICATIONS  (PRN):  cyclobenzaprine 5 milliGRAM(s) Oral three times a day PRN Muscle Spasm  ondansetron Injectable 4 milliGRAM(s) IV Push every 8 hours PRN Nausea and/or Vomiting  traMADol 25 milliGRAM(s) Oral every 8 hours PRN Moderate Pain (4 - 6)      ALLERGIES:  Allergies    Tylenol (Unknown)    Intolerances        LABS:                        10.0   16.37 )-----------( 174      ( 16 Dec 2022 07:33 )             30.5     12-16    137  |  106  |  58<H>  ----------------------------<  69<L>  3.6   |  16<L>  |  5.56<H>    Ca    7.0<L>      16 Dec 2022 07:33  Phos  7.3     12-16  Mg     1.40     12-16    TPro  5.0<L>  /  Alb  1.8<L>  /  TBili  0.5  /  DBili  x   /  AST  12  /  ALT  23  /  AlkPhos  196<H>  12-16        CAPILLARY BLOOD GLUCOSE      POCT Blood Glucose.: 72 mg/dL (16 Dec 2022 09:03)      RADIOLOGY & ADDITIONAL TESTS: Reviewed.    ASSESSMENT:    PLAN:

## 2022-12-16 NOTE — CHART NOTE - NSCHARTNOTEFT_GEN_A_CORE
Patient s/p EGD today with esophageal stent removal. Pt found to have LA Grade D esophagitis and large ulcer in gastric cardia 2/2 stent erosion.    - Clear liquid diet today.   - Please continue on protonix 40 mg BID.   - Please start on carafate suspension 1g q6 hrs.  - Await pathology results.     Stacey Brennan, PGY5  Gastroenterology/Hepatology Fellow  Available on Microsoft Teams  [phone] ([place] Short Range Pager)  [phone] (Long Range Pager)    After 5pm, please contact the on-call GI fellow. [phone]

## 2022-12-17 NOTE — CHART NOTE - NSCHARTNOTEFT_GEN_A_CORE
Patient's outside nephrology group [facility] consulted Patient's outside nephrology group [facility] consulted    Addendum: Nephrologist from [facility] stated they do not come to [facility] consulted

## 2022-12-17 NOTE — PROGRESS NOTE ADULT - PROBLEM SELECTOR PLAN 1
Hx of esophageal stricture (s/p stent on 5/2022 by Dr. Hernandez?)   intractable N/V, transferred to [facility] for esophageal stent removal by Dr. Erickson - performed showing severe esophagitis  - PPI   - Carafate  -Zofran PRN - Follow up with ECG for QTc, last one on 11/27 Qtc was: 487  -GI Consulted  -Tramadol PRN for pain , if unable to tolerate low dose Dilaudid 1mg q8h PRN (allergy to Tylenol? and has CKD)  - Clear liquid diet for now

## 2022-12-17 NOTE — PROGRESS NOTE ADULT - SUBJECTIVE AND OBJECTIVE BOX
Gastroenterology/Hepatology Progress Note    Interval Events: Patient endorses chronic epigastric pain. Denies dysphagia, odynophagia.     Allergies:  Tylenol (Unknown)    Hospital Medications:  benzocaine 15 mG/menthol 3.6 mG Lozenge 1 Lozenge Oral once  bisacodyl 5 milliGRAM(s) Oral at bedtime  cyclobenzaprine 5 milliGRAM(s) Oral three times a day PRN  folic acid 1 milliGRAM(s) Oral daily  guaiFENesin  milliGRAM(s) Oral every 12 hours  heparin   Injectable 5000 Unit(s) SubCutaneous every 8 hours  magnesium sulfate  IVPB 1 Gram(s) IV Intermittent once  NIFEdipine XL 30 milliGRAM(s) Oral daily  ondansetron Injectable 4 milliGRAM(s) IV Push every 8 hours PRN  pantoprazole  Injectable 40 milliGRAM(s) IV Push two times a day  piperacillin/tazobactam IVPB.. 3.375 Gram(s) IV Intermittent every 12 hours  sucralfate 1 Gram(s) Oral every 6 hours  thiamine 100 milliGRAM(s) Oral daily  traMADol 25 milliGRAM(s) Oral every 8 hours PRN    ROS: 14 point ROS negative unless otherwise state in subjective    PHYSICAL EXAM:   Vital Signs:  Vital Signs Last 24 Hrs  T(C): 36.8 (17 Dec 2022 06:00), Max: 36.9 (16 Dec 2022 19:20)  T(F): 98.2 (17 Dec 2022 06:00), Max: 98.5 (16 Dec 2022 19:20)  HR: 87 (17 Dec 2022 06:00) (75 - 87)  BP: 147/93 (17 Dec 2022 06:00) (131/72 - 147/93)  BP(mean): --  RR: 18 (16 Dec 2022 22:48) (16 - 18)  SpO2: 100% (17 Dec 2022 06:00) (96% - 100%)    Parameters below as of 17 Dec 2022 06:00  Patient On (Oxygen Delivery Method): nasal cannula  O2 Flow (L/min): 1    Daily     Daily     GENERAL:  No acute distress  HEENT:  NCAT, no scleral icterus  CHEST: no resp distress  HEART:  RRR  ABDOMEN:  Soft, non-distended, epigastric TTP  EXTREMITIES:  No cyanosis, clubbing, or edema  SKIN:  No rash/erythema/ecchymoses/petechiae   NEURO:  Alert and oriented x 3     LABS:                        10.0   16.37 )-----------( 174      ( 16 Dec 2022 07:33 )             30.5       12-16    137  |  106  |  58<H>  ----------------------------<  69<L>  3.6   |  16<L>  |  5.56<H>    Ca    7.0<L>      16 Dec 2022 07:33  Phos  7.3     12-16  Mg     1.40     12-16    TPro  5.0<L>  /  Alb  1.8<L>  /  TBili  0.5  /  DBili  x   /  AST  12  /  ALT  23  /  AlkPhos  196<H>  12-16    LIVER FUNCTIONS - ( 16 Dec 2022 07:33 )  Alb: 1.8 g/dL / Pro: 5.0 g/dL / ALK PHOS: 196 U/L / ALT: 23 U/L / AST: 12 U/L / GGT: x                     Imaging:  reviewed

## 2022-12-17 NOTE — PROGRESS NOTE ADULT - PROBLEM SELECTOR PLAN 2
no Resolved  Septic shock due to psuedomonal PNA, Lung abscess, morganella bacteremia and kelbsiella UTI  On Zosyn since 11/25, as per ID in [facility] - continue with Zosyn while inpatient  - upon discharge change abx to po Ceftin x 4 weeks total

## 2022-12-17 NOTE — CONSULT NOTE ADULT - SUBJECTIVE AND OBJECTIVE BOX
[facility] DIVISION OF KIDNEY DISEASES AND HYPERTENSION -- [phone]  -- INITIAL CONSULT NOTE  --------------------------------------------------------------------------------  HPI: Pt is a 62-year-old Male with Hx of esophageal stricture (S/p stenting in 5/22), Emphysema, HCV, chronic pancreatitis who initially presented to [facility] on 11/25/22 for AMS. Pt was admitted to [facility] on 11/25 for AMS 2/2 Septic shock due to Pseudomonal PNA, Lung abscess, Morganella bacteremia and klebsiella UTI. Pt was initially intubated for acute respiratory failure and required pressors for hypotension. Pt's hospital course was complicated by YUNIEL/ATN requiring HD. Pt was extubated and was transferred to [facility] for esophageal stent removal by Dr. Erickson. Pt was also found to be COVID + on 12/13/22. Initial labs on this admission concerning for elevated Scr. Nephrology team was consulted for YUNIEL.     On review of [facility], it was noted that Scr was elevated at 2.20 in September 2022. Scr was elevated at 3.92 on 8/30/22. Scr elevated / improved to 3.38 on 9/5/22. pt was recently admitted at [facility]. Scr on admission was significantly elevated at 8.94 on 11/25/22. Pt was seen by in-house nephrologist Dr. Dueks. Pt received 1st HD session on 11/26/22. Last HD session was done on 12/1/22. HD catheter was removed as kidney function was recovering. Scr was elevated at 5.28 on transfer from [facility] on 12/15/22. Scr remained elevated at 5.56 today. CT scan abdo done on 12/5/22 showed L kidney cyst but no hydronephrosis. UA done on 11/25/22 showed proteinuria. Pt says he received HD ~ 2 years for similar symptoms. Pt denies seeing a nephrology after discharge (2 years ago). Denies recent intake of NSAIDs/ motrin. Denies Hx of kidney stones.      Pt. seen and examined at bedside. Pt. reports poor oral intake. Also endorse persistent nausea and generalized body ache. Denies any SOB, CP, HA, N/V, abdominal pain , urinary symptoms or dizziness.     PAST HISTORY  --------------------------------------------------------------------------------  PAST MEDICAL & SURGICAL HISTORY:  ETOH abuse  sober x1 year approximately  Pancreatitis  Hepatitis C  treated  Gout  HTN (hypertension)  Chronic kidney disease (CKD)    H/O chronic pancreatitis  Gout  Alcohol abuse  Gastric perforation    FAMILY HISTORY:  FH: HTN (hypertension)    PAST SOCIAL HISTORY:    ALLERGIES & MEDICATIONS  --------------------------------------------------------------------------------  Allergies    Tylenol (Unknown)    Intolerances    Standing Inpatient Medications  benzocaine 15 mG/menthol 3.6 mG Lozenge 1 Lozenge Oral once  bisacodyl 5 milliGRAM(s) Oral at bedtime  folic acid 1 milliGRAM(s) Oral daily  guaiFENesin  milliGRAM(s) Oral every 12 hours  heparin   Injectable 5000 Unit(s) SubCutaneous every 8 hours  magnesium sulfate  IVPB 1 Gram(s) IV Intermittent once  NIFEdipine XL 30 milliGRAM(s) Oral daily  pantoprazole  Injectable 40 milliGRAM(s) IV Push two times a day  piperacillin/tazobactam IVPB.. 3.375 Gram(s) IV Intermittent every 12 hours  sucralfate suspension 1 Gram(s) Oral every 6 hours  thiamine 100 milliGRAM(s) Oral daily    PRN Inpatient Medications  cyclobenzaprine 5 milliGRAM(s) Oral three times a day PRN  ondansetron Injectable 4 milliGRAM(s) IV Push every 8 hours PRN  traMADol 25 milliGRAM(s) Oral every 8 hours PRN    REVIEW OF SYSTEMS  --------------------------------------------------------------------------------  Gen: No fevers/chills, lethargy+  Skin: No rashes  Head/Eyes/Ears: No HA,   Respiratory: No dyspnea, cough  CV: No chest pain  GI: No abdominal pain, diarrhea  : No dysuria, hematuria  MSK: No  edema  Heme: No easy bruising or bleeding  Psych: No significant depression    All other systems were reviewed and are negative, except as noted.    VITALS/PHYSICAL EXAM  --------------------------------------------------------------------------------  T(C): 36.3 (12-17-22 @ 11:45), Max: 36.9 (12-16-22 @ 19:20)  HR: 92 (12-17-22 @ 11:45) (80 - 92)  BP: 135/75 (12-17-22 @ 11:45) (131/72 - 147/93)  RR: 18 (12-17-22 @ 11:45) (18 - 18)  SpO2: 100% (12-17-22 @ 11:45) (100% - 100%)  Wt(kg): --    Weight (kg): 59.1 (12-16-22 @ 07:36)    Physical Exam:  	Gen: NAD  	HEENT: dry mucus membrane  	Pulm: CTA B/L  	CV: S1S2  	Abd: Soft, +BS   	Ext: No LE edema B/L  	Neuro: Awake  	Skin: Warm and dry  	Vascular access: IV peripheral canula    LABS/STUDIES  --------------------------------------------------------------------------------              10.0   16.37 >-----------<  174      [12-16-22 @ 07:33]              30.5     137  |  106  |  58  ----------------------------<  69      [12-16-22 @ 07:33]  3.6   |  16  |  5.56        Ca     7.0     [12-16-22 @ 07:33]      Mg     1.40     [12-16-22 @ 07:33]      Phos  7.3     [12-16-22 @ 07:33]    TPro  5.0  /  Alb  1.8  /  TBili  0.5  /  DBili  x   /  AST  12  /  ALT  23  /  AlkPhos  196  [12-16-22 @ 07:33]    Creatinine Trend:  SCr 5.56 [12-16 @ 07:33]  SCr 5.28 [12-15 @ 11:30]  SCr 5.42 [12-14 @ 06:22]  SCr 5.22 [12-13 @ 10:45]  SCr 5.21 [12-12 @ 07:12]    Urinalysis - [11-25-22 @ 05:15]      Color Yellow / Appearance very cloudy / SG 1.010 / pH 7.0      Gluc Negative / Ketone Trace  / Bili Negative / Urobili Negative       Blood Large / Protein 500 / Leuk Est Moderate / Nitrite Negative      RBC 3-5 / WBC >50 / Hyaline  / Gran  / Sq Epi  / Non Sq Epi Few / Bacteria Many    Iron 61, TIBC --, %sat --      [12-08-22 @ 08:25]  Ferritin 264      [12-08-22 @ 08:25]  HbA1c 5.0      [03-22-19 @ 09:03]  TSH 1.380      [09-01-22 @ 08:58]  Lipid: chol 147, TG 88, HDL 59, LDL --      [09-01-22 @ 08:58]    HBsAb 15.0      [11-26-22 @ 18:20]  HBsAb Reactive      [06-12-19 @ 11:41]  HBsAg Nonreact      [11-27-22 @ 15:00]  HBcAb Reactive      [02-10-22 @ 02:14]  HCV 4.97, Reactive      [11-27-22 @ 15:00]  HIV Nonreact      [11-25-22 @ 11:56]  HIV Nonreact      [04-06-22 @ 14:56]    JAZMIN: titer Negative, pattern --      [06-12-19 @ 11:42]  C3 Complement 61      [06-21-19 @ 10:32]  C4 Complement 37      [06-17-19 @ 13:26]  Rheumatoid Factor <10      [06-19-19 @ 15:50]  ASLO 57      [06-05-19 @ 11:01]  Free Light Chains: kappa 13.31, lambda 12.94, ratio = 1.03      [12-07 @ 05:35]  Immunofixation Serum: No Monoclonal Band Identified    Reference Range: None Detected      [12-07-22 @ 05:35]  SPEP Interpretation: Hypoalbuminemia      [12-07-22 @ 05:35]  Cryoglobulin: Negative      [06-19-19 @ 15:05]

## 2022-12-17 NOTE — CONSULT NOTE ADULT - PROBLEM SELECTOR RECOMMENDATION 9
Pt with YUNIEL on CKD likely multifactorial in the setting of recent COVID infection, poor oral intake. Scr was elevated at 2.20 in September 2022. Scr was elevated at 3.92 on 8/30/22. Scr elevated / improved to 3.38 on 9/5/22. pt was recently admitted at [facility]. Scr on admission was significantly elevated at 8.94 on 11/25/22. Pt was seen by in-house nephrologist Dr. Dukes. Pt received 1st HD session on 11/26/22. Last HD session was done on 12/1/22. HD catheter was removed as kidney function was recovering. Scr was elevated at 5.28 on transfer from [facility] on 12/15/22. Scr remained elevated at 5.56 today. CT scan abdo done on 12/5/22 showed L kidney cyst but no hydronephrosis. UA done on 11/25/22 showed proteinuria. Pt says he received HD ~ 2 years for similar symptoms. Recommend repeat UA and spot urine TP/CR. Check kidney sonogram. Labs reviewed. Pt clinically vol depleted. Recommend to start IV bicarb (d5W with 150mEq bicarb) @ 75cc/hr for 24 hrs. No urgent indication for HD. Monitor labs and urine output. Avoid any potential nephrotoxins. Dose medications as per eGFR.     If you have any questions, please feel free to contact me  Jeff Pete  Nephrology Fellow  [phone]/ [employer] Teams(Preferred)  (After 5pm or on weekends please page the on-call fellow).

## 2022-12-17 NOTE — PHYSICAL THERAPY INITIAL EVALUATION ADULT - PERTINENT HX OF CURRENT PROBLEM, REHAB EVAL
62 year old male presenting from [facility] for esophageal stent removal due to intractable n/v. Admitted to [facility] from 11/25-12/14 for AMS secondary to septic shock.

## 2022-12-17 NOTE — PHYSICAL THERAPY INITIAL EVALUATION ADULT - PATIENT PROFILE REVIEW, REHAB EVAL
Activity - out of bed to chair. Pt cleared for PT evaluation prior to session by [facility] Agustin./yes No specific activity orders. Pt cleared for PT evaluation prior to session by [facility] Agustni./yes

## 2022-12-17 NOTE — PROGRESS NOTE ADULT - SUBJECTIVE AND OBJECTIVE BOX
Hospitalist Progress Note  Yessy Montelongo MD Pager # [phone]    OVERNIGHT EVENTS: YOSSI    SUBJECTIVE / INTERVAL HPI: Patient seen and examined at bedside. Patient reports he is belching and has abdominal discomfort. He would like food. He denies SOB or cough. No vomiting. He is able to tolerate the clear liquid diet. All other ROS negative.     VITAL SIGNS:  Vital Signs Last 24 Hrs  T(C): 36.3 (17 Dec 2022 11:45), Max: 36.9 (16 Dec 2022 19:20)  T(F): 97.4 (17 Dec 2022 11:45), Max: 98.5 (16 Dec 2022 19:20)  HR: 92 (17 Dec 2022 11:45) (80 - 92)  BP: 135/75 (17 Dec 2022 11:45) (131/72 - 147/93)  BP(mean): --  RR: 18 (17 Dec 2022 11:45) (18 - 18)  SpO2: 100% (17 Dec 2022 11:45) (100% - 100%)    Parameters below as of 17 Dec 2022 11:45  Patient On (Oxygen Delivery Method): room air  O2 Flow (L/min): 1      PHYSICAL EXAM:    General: Cachetic appearing male resting in bed   HEENT: NC/AT; PERRL, anicteric sclera; MMM  Neck: supple  Cardiovascular: +S1/S2; RRR  Respiratory: CTA B/L; no W/R/R  Gastrointestinal: soft, NT/ND; +BSx4  Extremities: WWP; no clubbing or cyanosis - right arm edema   Vascular: 2+ radial, DP/PT pulses B/L  Neurological: AAOx3; no focal deficits    MEDICATIONS:  MEDICATIONS  (STANDING):  benzocaine 15 mG/menthol 3.6 mG Lozenge 1 Lozenge Oral once  bisacodyl 5 milliGRAM(s) Oral at bedtime  folic acid 1 milliGRAM(s) Oral daily  guaiFENesin  milliGRAM(s) Oral every 12 hours  heparin   Injectable 5000 Unit(s) SubCutaneous every 8 hours  magnesium sulfate  IVPB 1 Gram(s) IV Intermittent once  NIFEdipine XL 30 milliGRAM(s) Oral daily  pantoprazole  Injectable 40 milliGRAM(s) IV Push two times a day  piperacillin/tazobactam IVPB.. 3.375 Gram(s) IV Intermittent every 12 hours  sucralfate suspension 1 Gram(s) Oral every 6 hours  thiamine 100 milliGRAM(s) Oral daily    MEDICATIONS  (PRN):  cyclobenzaprine 5 milliGRAM(s) Oral three times a day PRN Muscle Spasm  ondansetron Injectable 4 milliGRAM(s) IV Push every 8 hours PRN Nausea and/or Vomiting  traMADol 25 milliGRAM(s) Oral every 8 hours PRN Moderate Pain (4 - 6)      ALLERGIES:  Allergies    Tylenol (Unknown)    Intolerances        LABS:                        10.0   16.37 )-----------( 174      ( 16 Dec 2022 07:33 )             30.5     12-16    137  |  106  |  58<H>  ----------------------------<  69<L>  3.6   |  16<L>  |  5.56<H>    Ca    7.0<L>      16 Dec 2022 07:33  Phos  7.3     12-16  Mg     1.40     12-16    TPro  5.0<L>  /  Alb  1.8<L>  /  TBili  0.5  /  DBili  x   /  AST  12  /  ALT  23  /  AlkPhos  196<H>  12-16        CAPILLARY BLOOD GLUCOSE      POCT Blood Glucose.: 72 mg/dL (16 Dec 2022 09:03)      RADIOLOGY & ADDITIONAL TESTS: Reviewed.    ASSESSMENT:    PLAN:

## 2022-12-17 NOTE — PROGRESS NOTE ADULT - ASSESSMENT
Pt is a 63 yo M w/ PMHx benign esophageal stricture s/p fully covered esophageal stent placement 4/19/22 c/b stent migration w/ repeat EDGD 4/29/22 for stricture repositioning and suturing, HCV, chronic pancreatitis, emphysema presenting as transfer from [facility] with nausea/vomiting. Patient hospitalized there 11/25-12/14 for septic shock 2/2 pseudomonal PNA, lung abscess, Morganella bacteremia, Klebsiella UTI. Pt required intubation for RF and was on pressors. Pt's hospital course was complicated by YUNIEL due to septic ATN requiring 3 rounds of HD. Pt is on now extubated, on 4L of NC, off pressors. Pt developed intractable N/V during his hospital stay and was transferred to [facility] for evaluation of esophageal stent removal.  Pt was also found to be COVID + on 12/13/22. Patient was also noted to have anemia with Hgb in 6s. S/p 2U pRBC on 11/25 and 2U on 12/13. Patient w/ no overt bleeding at outside hospital.     #Anemia, normocytic. Hgb 6.2 on 12/13, increased to 8.9 after 2U pRBCs. No overt GIB noted in [facility]. No active bleeding noted on EGD 12/16  #Epigastric abdominal pain, likely 2/2 chronic pancreatitis. Lipase 11 on 12/13, in 400s at time of admission to [facility] on 11/25. CT A/P 12/5 shows diffuse calcifications throughout the pancreas consistent with chronic calcific pancreatitis.  #Esophageal stricture: Patient with hx of esophageal stricture requiring stent placement on 4/19/2022. Post-procedural course c/b hematemesis with stent malposition requiring EGD for stent repositioning and suture on 4/29/2022 with sloughing of esophageal mucosa noted.    - s/p EGD 12/16: Pre-existing esophageal stent removed. LA Grade D esophagitis. Erythematous mucosa in the pylorus. Biopsied. Congested duodenal mucosa. Biopsied.  #Nausea/vomiting: Pt denies n/v currently, however noted at OSH. CT A/P w/ po contrast on 12/5 shows esophageal stent in place partially filled with debris/ingested material.  #PNA, pulmonary abscess: Patient on zosyn, supplemental O2 via NC  #HCV w/ SVR. Liver normal appearing on CT  #Acute renal failure    Recommendations  - advance to soft diet  - Please continue on protonix 40 mg BID.  - Please start on carafate suspension 1g q6 hrs.  - Await pathology results     All recommendations are tentative until note is attested by attending.     Stacey Brennan, PGY5  Gastroenterology/Hepatology Fellow  Available on [employer] Teams  [phone] ([employer] Short Range Pager)  [phone] (Long Range Pager)    After 5pm, please contact the on-call GI fellow. [phone]

## 2022-12-17 NOTE — PROGRESS NOTE ADULT - ASSESSMENT
62 year old male with Hx of esophageal stricture (s/p stent on 5/2022 by Dr. Hernandez?), Emphysema, HCV, chronic pancreatitis, presenting from [facility] for esophageal stent removal due to intractable n/v. Admitted to [facility] from 11/25-12/14 for ams 2/2 Septic shock due to psuedomonal PNA, Lung abscess, morganella bacteremia and kelbsiella UTI c/b ATN requiring HD and intractable n/v.

## 2022-12-17 NOTE — PROGRESS NOTE ADULT - ATTENDING COMMENTS
62 M hx of benign esophageal stricuture s/p FCMS placement (sutured), HCV, chronic pancreatitis, emphysema transferred for N/V. Found to be COVID +. S/p EGD with stent removal. Grade D esophagitis also seen. Doing well overall postprocedure. Recommend PPI BID, soft diet and carafate x 2 weeks.

## 2022-12-17 NOTE — PHYSICAL THERAPY INITIAL EVALUATION ADULT - ADDITIONAL COMMENTS
Pt states he lives with his mother in a house with 4 steps to enter. Prior to admission pt reports ambulating with a rolling walker and was independent in ADLs, denies having any home health services.    Following evaluation, pt was left semireclined in bed in no distress, call graves in reach. [facility] keller.

## 2022-12-18 NOTE — PROGRESS NOTE ADULT - PROBLEM SELECTOR PLAN 1
Hx of esophageal stricture (s/p stent on 5/2022 by Dr. Hernandez?)   intractable N/V, transferred to [facility] for esophageal stent removal by Dr. Erickson - performed showing severe esophagitis  - PPI   - Carafate  -Zofran PRN - Follow up with ECG for QTc, last one on 11/27 Qtc was: 487  -GI Consulted  -Tramadol PRN for pain , if unable to tolerate low dose Dilaudid 1mg q8h PRN (allergy to Tylenol? and has CKD)  - Clear liquid diet for now

## 2022-12-18 NOTE — PROGRESS NOTE ADULT - ASSESSMENT
62 year old male with Hx of esophageal stricture (s/p stent on 5/2022 by Dr. Hernandez?), Emphysema, HCV, chronic pancreatitis, presenting from [facility] for esophageal stent removal due to intractable n/v. Admitted to [facility] from 11/25-12/14 for ams 2/2 Septic shock due to psuedomonal PNA, Lung abscess, morganella bacteremia and kelbsiella UTI c/b ATN requiring HD and intractable n/v.

## 2022-12-18 NOTE — PROGRESS NOTE ADULT - PROBLEM SELECTOR PLAN 2
In the setting of IV bicarb therapy and poor oral intake and hypomagnesemia. Serum potassium is low at 3.1 today. Recommend IV magnesium repletion and then  IV /PO potassium repletion. Monitor serum potassium.

## 2022-12-18 NOTE — PROGRESS NOTE ADULT - SUBJECTIVE AND OBJECTIVE BOX
Hospitalist Progress Note  Yessy Montelongo MD Pager # [phone]    OVERNIGHT EVENTS: YOSSI    SUBJECTIVE / INTERVAL HPI: Patient seen and examined at bedside. Patient reports ongoing pain and discomfort in his abdomen. He says he would like a regular diet. He wants double trays and 3 peanut butter and jelly sandwiches on each tray. No vomiting on the liquid diet.     VITAL SIGNS:  Vital Signs Last 24 Hrs  T(C): 36.6 (18 Dec 2022 06:28), Max: 36.6 (17 Dec 2022 17:14)  T(F): 97.9 (18 Dec 2022 06:28), Max: 97.9 (17 Dec 2022 17:14)  HR: 88 (18 Dec 2022 06:28) (83 - 89)  BP: 147/84 (18 Dec 2022 06:28) (139/85 - 151/85)  BP(mean): --  RR: 18 (18 Dec 2022 06:28) (18 - 18)  SpO2: 99% (18 Dec 2022 06:28) (99% - 100%)    Parameters below as of 18 Dec 2022 06:28  Patient On (Oxygen Delivery Method): nasal cannula  O2 Flow (L/min): 2      PHYSICAL EXAM:    General: Uncomfortable appearing male resting in bed   HEENT: NC/AT; PERRL, anicteric sclera; MMM  Neck: supple  Cardiovascular: +S1/S2; RRR - reproducible chest pain - TTP of the chest wall and abdomen.   Respiratory: CTA B/L; no W/R/R  Gastrointestinal: soft, TTP diffusely throughout the abdomen/ND; +BSx4  Extremities: WWP; no edema, clubbing or cyanosis  Vascular: 2+ radial, DP/PT pulses B/L  Neurological: AAOx3; no focal deficits    MEDICATIONS:  MEDICATIONS  (STANDING):  benzocaine 15 mG/menthol 3.6 mG Lozenge 1 Lozenge Oral once  bisacodyl 5 milliGRAM(s) Oral at bedtime  folic acid 1 milliGRAM(s) Oral daily  heparin   Injectable 5000 Unit(s) SubCutaneous every 8 hours  NIFEdipine XL 30 milliGRAM(s) Oral daily  pantoprazole  Injectable 40 milliGRAM(s) IV Push two times a day  piperacillin/tazobactam IVPB.. 3.375 Gram(s) IV Intermittent every 12 hours  potassium chloride   Powder 40 milliEquivalent(s) Oral once  sodium bicarbonate  Infusion 0.19 mEq/kG/Hr (75 mL/Hr) IV Continuous <Continuous>  sucralfate suspension 1 Gram(s) Oral every 6 hours  thiamine 100 milliGRAM(s) Oral daily    MEDICATIONS  (PRN):  baclofen 5 milliGRAM(s) Oral every 12 hours PRN hiccups  cyclobenzaprine 5 milliGRAM(s) Oral three times a day PRN Muscle Spasm  HYDROmorphone  Injectable 1 milliGRAM(s) IV Push every 8 hours PRN Severe Pain (7 - 10)  ondansetron Injectable 4 milliGRAM(s) IV Push every 8 hours PRN Nausea and/or Vomiting  traMADol 25 milliGRAM(s) Oral every 8 hours PRN Moderate Pain (4 - 6)      ALLERGIES:  Allergies    Tylenol (Unknown)    Intolerances        LABS:                        9.9    15.38 )-----------( 241      ( 18 Dec 2022 00:50 )             29.8     12-    139  |  107  |  64<H>  ----------------------------<  228<H>  3.1<L>   |  15<L>  |  5.89<H>    Ca    6.5<LL>      18 Dec 2022 00:50  Phos  7.0     -  Mg     1.40         TPro  5.4<L>  /  Alb  2.1<L>  /  TBili  <0.2  /  DBili  <0.2  /  AST  22  /  ALT  18  /  AlkPhos  183<H>        Urinalysis Basic - ( 18 Dec 2022 00:40 )    Color: Light Yellow / Appearance: Clear / S.013 / pH: x  Gluc: x / Ketone: Negative  / Bili: Negative / Urobili: <2 mg/dL   Blood: x / Protein: 30 mg/dL / Nitrite: Negative   Leuk Esterase: Negative / RBC: 2 /HPF / WBC 1 /HPF   Sq Epi: x / Non Sq Epi: 1 /HPF / Bacteria: Negative      CAPILLARY BLOOD GLUCOSE          RADIOLOGY & ADDITIONAL TESTS: Reviewed.    ASSESSMENT:    PLAN:

## 2022-12-18 NOTE — PROGRESS NOTE ADULT - PROBLEM SELECTOR PLAN 3
In the setting of IV bicarb therapy and CKD. Serum Harry is low at 6.5(corrected at 8.4) today. Check ionized Calcium. Recommend IV calcium repletion. Monitor serum calcium.     If you have any questions, please feel free to contact me  Jeff Pete  Nephrology Fellow  [phone]/ [employer] Teams(Preferred)  (After 5pm or on weekends please page the on-call fellow).

## 2022-12-18 NOTE — PROGRESS NOTE ADULT - PROBLEM SELECTOR PLAN 4
S/p HD X 3  in ICU in [facility]   Cr plateaued , off HD now  Avoid nephrotoxic, no contrast , no NSAID  Renally dose medications   Consult Nephro in am    #Hypernatremia:   monitor cmp     #anemia of chronic disease  s/p 1u prbc in [facility] and s/p Retacrit IV Fe    #Hypocalcemia  - Calcium 6.5 on labs. Corrected calcium is 8.0  - Continue to monitor

## 2022-12-18 NOTE — PROGRESS NOTE ADULT - PROBLEM SELECTOR PLAN 1
Pt with YUNIEL on CKD likely multifactorial in the setting of recent COVID infection, poor oral intake. Scr was elevated at 2.20 in September 2022. Scr was elevated at 3.92 on 8/30/22. Scr elevated / improved to 3.38 on 9/5/22. pt was recently admitted at [facility]. Scr on admission was significantly elevated at 8.94 on 11/25/22. Pt was seen by in-house nephrologist Dr. Dukes. Pt received 1st HD session on 11/26/22. Last HD session was done on 12/1/22. HD catheter was removed as kidney function was recovering. Scr was elevated at 5.28 on transfer from [facility] on 12/15/22. Scr remained elevated at 5.56 today. CT scan abdo done on 12/5/22 showed L kidney cyst but no hydronephrosis. UA done on 11/25/22 showed proteinuria. Pt says he received HD ~ 2 years for similar symptoms. Repeat UA showed trace proteins done o 12/18/22. spot urine TP/CR is elevated at 0.7. Check kidney sonogram. Labs reviewed. Pt clinically vol depleted. Recommend to continue IV bicarb (d5W with 150mEq bicarb) @ 75cc/hr today. No urgent indication for HD. Monitor labs and urine output. Avoid any potential nephrotoxins. Dose medications as per eGFR.

## 2022-12-18 NOTE — CHART NOTE - NSCHARTNOTEFT_GEN_A_CORE
ACP MEDICINE COVERAGE - Medicine Subsequent Hospital Care Note    Notified by RN that Pt c/o chest pain. Pt seen and assessed at bedside, A&Ox3 (person, place, time). Pt states that he has been experiencing chest pain and abdominal pain since admission. Pt states that with the onset of the pain he also experiences body aches and the pain radiates from the epigastric region to the middle of his chest. Pt endorses that the pain comes and goes and the only thing that makes it better are his pain meds. Pt denies N/V, HA, SOB, palpitations, diaphoresis, vision changes, diarrhea, numbness, rashes.    Vital Signs Last 24 Hrs  T(C): 36.4 (12-17-22 @ 20:41), Max: 36.8 (12-17-22 @ 06:00)  T(F): 97.6 (12-17-22 @ 20:41), Max: 98.2 (12-17-22 @ 06:00)  HR: 88 (12-18-22 @ 03:07) (83 - 92)  BP: 147/74 (12-18-22 @ 03:07) (135/75 - 151/85)  RR: 18 (12-18-22 @ 03:07) (18 - 18)  SpO2: 100% (12-18-22 @ 03:07) (100% - 100%) on (O2)    PHYSICAL EXAM:  Constitutional: NAD, +weakness, chills, body aches  Respiratory:  No wheezes, rales or rhonchi. No SOB  Cardiovascular: regular rate and rhythm, S1 and S2, no murmurs, rubs or gallops, no edema, 2+ peripheral pulses  Gastrointestinal: +epigastric pain. Nontender, nondistended, +bowel sounds in all four quadrants, no hernia, no N/V/D/C  Skin: warm, dry, no rash, no itching, rashes  Neurologic: sensation grossly intact, CN grossly intact, non-focal exam  Psychiatric: A&Ox3, appropriate mood, affect    Problem/Plan:  Epigastric + Chest pain, r/o ACS   - EKG - NSR 88bpm, unchanged from previous.  - Cardiac Enzymes -  Troponin 210, CKMB 4.7  - F/u CBC, BMP, Mg, Phos replete lytes prn  - Dilaudid 1mg IV x1 administered  - C/w Dilaudid 1mg PO q8h PRN    Will continue to monitor overnight and relay event to day team. ACP MEDICINE COVERAGE - Medicine Subsequent Hospital Care Note    Notified by RN that Pt c/o chest pain. Pt seen and assessed at bedside, A&Ox3 (person, place, time). Pt states that he has been experiencing chest pain and abdominal pain since admission. Pt states that with the onset of the pain he also experiences body aches and the pain radiates from the epigastric region to the middle of his chest. Pt endorses that the pain comes and goes and the only thing that makes it better are his pain meds. Pt denies N/V, HA, SOB, palpitations, diaphoresis, vision changes, diarrhea, numbness, rashes.    Vital Signs Last 24 Hrs  T(C): 36.4 (12-17-22 @ 20:41), Max: 36.8 (12-17-22 @ 06:00)  T(F): 97.6 (12-17-22 @ 20:41), Max: 98.2 (12-17-22 @ 06:00)  HR: 88 (12-18-22 @ 03:07) (83 - 92)  BP: 147/74 (12-18-22 @ 03:07) (135/75 - 151/85)  RR: 18 (12-18-22 @ 03:07) (18 - 18)  SpO2: 100% (12-18-22 @ 03:07) (100% - 100%) on (O2)    PHYSICAL EXAM:  Constitutional: NAD, +weakness, chills, body aches  Respiratory:  No wheezes, rales or rhonchi. No SOB  Cardiovascular: regular rate and rhythm, S1 and S2, no murmurs, rubs or gallops, no edema, 2+ peripheral pulses  Gastrointestinal: +epigastric pain. Nontender, nondistended, +bowel sounds in all four quadrants, no hernia, no N/V/D/C  Skin: warm, dry, no rash, no itching, rashes  Neurologic: sensation grossly intact, CN grossly intact, non-focal exam  Psychiatric: A&Ox3, appropriate mood, affect    Problem/Plan:  Epigastric + Chest pain, r/o ACS   - EKG - NSR 88bpm, unchanged from previous.  - Cardiac Enzymes -  Troponin 210, CKMB 4.7  - F/u CBC, BMP, Mg, Phos replete lytes prn  - Dilaudid 1mg IV x1 administered  - C/w Dilaudid 1mg PO q8h PRN  - C/w Protonix 40mg BID  - C/w Zosyn i/s/o lung abscess  Will continue to monitor overnight and relay event to day team.

## 2022-12-18 NOTE — PROGRESS NOTE ADULT - SUBJECTIVE AND OBJECTIVE BOX
[facility] DIVISION OF KIDNEY DISEASES AND HYPERTENSION -- FOLLOW UP NOTE  --------------------------------------------------------------------------------  HPI: Pt is a 62-year-old Male with Hx of esophageal stricture (S/p stenting in 5/22), Emphysema, HCV, chronic pancreatitis who initially presented to [facility] on 11/25/22 for AMS. Pt was admitted to [facility] on 11/25 for AMS 2/2 Septic shock due to Pseudomonal PNA, Lung abscess, Morganella bacteremia and klebsiella UTI. Pt was initially intubated for acute respiratory failure and required pressors for hypotension. Pt's hospital course was complicated by YUNIEL/ATN requiring HD. Pt was extubated and was transferred to [facility] for esophageal stent removal by Dr. Erickson. Pt was also found to be COVID + on 12/13/22. Initial labs on this admission concerning for elevated Scr. Nephrology team following for YUNIEL on CKD.     On review of [facility], it was noted that Scr was elevated at 2.20 in September 2022. Scr was elevated at 3.92 on 8/30/22. Scr elevated / improved to 3.38 on 9/5/22. pt was recently admitted at [facility]. Scr on admission was significantly elevated at 8.94 on 11/25/22. Pt was seen by in-house nephrologist Dr. Dukes. Pt received 1st HD session on 11/26/22. Last HD session was done on 12/1/22. HD catheter was removed as kidney function was recovering. Scr was elevated at 5.28 on transfer from [facility] on 12/15/22. Scr remained elevated at 5.56 today. CT scan abdo done on 12/5/22 showed L kidney cyst but no hydronephrosis. UA done on 11/25/22 showed proteinuria. Pt says he received HD ~ 2 years for similar symptoms. Pt denies seeing a nephrology after discharge (2 years ago).      Pt. seen and examined at bedside. Pt. reports feeling hungry. Denies any SOB, CP, HA, N/V, abdominal pain , urinary symptoms or dizziness. Scr is elevated/stable at 5.89 today.     PAST HISTORY  --------------------------------------------------------------------------------  No significant changes to PMH, PSH, FHx, SHx, unless otherwise noted    ALLERGIES & MEDICATIONS  --------------------------------------------------------------------------------  Allergies    Tylenol (Unknown)    Intolerances    Standing Inpatient Medications  benzocaine 15 mG/menthol 3.6 mG Lozenge 1 Lozenge Oral once  bisacodyl 5 milliGRAM(s) Oral at bedtime  folic acid 1 milliGRAM(s) Oral daily  heparin   Injectable 5000 Unit(s) SubCutaneous every 8 hours  NIFEdipine XL 30 milliGRAM(s) Oral daily  pantoprazole  Injectable 40 milliGRAM(s) IV Push two times a day  piperacillin/tazobactam IVPB.. 3.375 Gram(s) IV Intermittent every 12 hours  potassium chloride   Powder 40 milliEquivalent(s) Oral once  sodium bicarbonate  Infusion 0.19 mEq/kG/Hr IV Continuous <Continuous>  sucralfate suspension 1 Gram(s) Oral every 6 hours  thiamine 100 milliGRAM(s) Oral daily    PRN Inpatient Medications  baclofen 5 milliGRAM(s) Oral every 12 hours PRN  cyclobenzaprine 5 milliGRAM(s) Oral three times a day PRN  ondansetron Injectable 4 milliGRAM(s) IV Push every 8 hours PRN  traMADol 25 milliGRAM(s) Oral every 8 hours PRN    REVIEW OF SYSTEMS  --------------------------------------------------------------------------------  Gen: No fevers   Respiratory: No dyspnea  CV: No chest pain  GI: No abdominal pain  : No dysuria  MSK: No  edema  Neuro: no dizziness   All other systems were reviewed and are negative, except as noted.    VITALS/PHYSICAL EXAM  --------------------------------------------------------------------------------  T(C): 36.6 (12-18-22 @ 06:28), Max: 36.6 (12-17-22 @ 17:14)  HR: 88 (12-18-22 @ 06:28) (83 - 92)  BP: 147/84 (12-18-22 @ 06:28) (135/75 - 151/85)  RR: 18 (12-18-22 @ 06:28) (18 - 18)  SpO2: 99% (12-18-22 @ 06:28) (99% - 100%)  Wt(kg): --    Physical Exam:  	Gen: NAD  	HEENT: dry mucus membrane  	Pulm: CTA B/L  	CV: S1S2  	Abd: Soft, +BS   	Ext: No LE edema B/L  	Neuro: Awake  	Skin: Warm and dry  	Vascular access: IV peripheral canula    LABS/STUDIES  --------------------------------------------------------------------------------              9.9    15.38 >-----------<  241      [12-18-22 @ 00:50]              29.8     139  |  107  |  64  ----------------------------<  228      [12-18-22 @ 00:50]  3.1   |  15  |  5.89        Ca     6.5     [12-18-22 @ 00:50]      Mg     1.40     [12-18-22 @ 00:50]      Phos  7.0     [12-18-22 @ 00:50]    CK 32      [12-18-22 @ 00:50]    Creatinine Trend:  SCr 5.89 [12-18 @ 00:50]  SCr 5.56 [12-16 @ 07:33]  SCr 5.28 [12-15 @ 11:30]  SCr 5.42 [12-14 @ 06:22]  SCr 5.22 [12-13 @ 10:45]    Urinalysis - [12-18-22 @ 00:40]      Color Light Yellow / Appearance Clear / SG 1.013 / pH 6.0      Gluc Negative / Ketone Negative  / Bili Negative / Urobili <2 mg/dL       Blood Trace / Protein 30 mg/dL / Leuk Est Negative / Nitrite Negative      RBC 2 / WBC 1 / Hyaline  / Gran  / Sq Epi  / Non Sq Epi 1 / Bacteria Negative    Urine Creatinine 46      [12-18-22 @ 00:40]  Urine Protein 34      [12-18-22 @ 00:40]    Iron 61, TIBC --, %sat --      [12-08-22 @ 08:25]  Ferritin 264      [12-08-22 @ 08:25]  HbA1c 5.0      [03-22-19 @ 09:03]  TSH 1.380      [09-01-22 @ 08:58]  Lipid: chol 147, TG 88, HDL 59, LDL --      [09-01-22 @ 08:58]    HBsAb 15.0      [11-26-22 @ 18:20]  HBsAg Nonreact      [11-27-22 @ 15:00]  HCV 4.97, Reactive      [11-27-22 @ 15:00]  HIV Nonreact      [11-25-22 @ 11:56]    Free Light Chains: kappa 13.31, lambda 12.94, ratio = 1.03      [12-07 @ 05:35]  Immunofixation Serum: No Monoclonal Band Identified    Reference Range: None Detected      [12-07-22 @ 05:35]  SPEP Interpretation: Hypoalbuminemia      [12-07-22 @ 05:35]

## 2022-12-19 NOTE — PROGRESS NOTE ADULT - PROBLEM SELECTOR PLAN 2
Resolved  Septic shock due to pseudomonal PNA, Lung abscess, morganella bacteremia and kelbsiella UTI  On Zosyn since 11/25, as per ID in [facility] - continue with Zosyn while inpatient  - upon discharge change abx to po Ceftin x 4 weeks total

## 2022-12-19 NOTE — PROGRESS NOTE ADULT - SUBJECTIVE AND OBJECTIVE BOX
Patient is a 62y old  Male who presents with a chief complaint of intractable N/V , esophageal stent removal (19 Dec 2022 10:52)    Canelo Herrera MD   [facility] Division of Hospital Medicine   Pager [phone]  Reachable on [employer] Teams     SUBJECTIVE / OVERNIGHT EVENTS:  Patient seen and examined this morinng. He states that epigastric pain is still present but feels as though it is improving.     MEDICATIONS  (STANDING):  benzocaine 15 mG/menthol 3.6 mG Lozenge 1 Lozenge Oral once  bisacodyl 5 milliGRAM(s) Oral at bedtime  calcium carbonate    500 mG (Tums) Chewable 1 Tablet(s) Chew once  folic acid 1 milliGRAM(s) Oral daily  heparin   Injectable 5000 Unit(s) SubCutaneous every 8 hours  NIFEdipine XL 30 milliGRAM(s) Oral daily  pantoprazole  Injectable 40 milliGRAM(s) IV Push two times a day  piperacillin/tazobactam IVPB.. 3.375 Gram(s) IV Intermittent every 12 hours  potassium chloride   Powder 40 milliEquivalent(s) Oral once  potassium chloride  20 mEq/100 mL IVPB 20 milliEquivalent(s) IV Intermittent every 2 hours  sucralfate suspension 1 Gram(s) Oral every 6 hours  thiamine 100 milliGRAM(s) Oral daily    MEDICATIONS  (PRN):  cyclobenzaprine 5 milliGRAM(s) Oral three times a day PRN Muscle Spasm  ondansetron Injectable 4 milliGRAM(s) IV Push every 8 hours PRN Nausea and/or Vomiting  traMADol 25 milliGRAM(s) Oral every 8 hours PRN Moderate Pain (4 - 6)      Vital Signs Last 24 Hrs  T(C): 36.7 (19 Dec 2022 09:45), Max: 36.7 (18 Dec 2022 17:30)  T(F): 98.1 (19 Dec 2022 09:45), Max: 98.1 (19 Dec 2022 07:05)  HR: 95 (19 Dec 2022 09:45) (87 - 95)  BP: 151/84 (19 Dec 2022 09:45) (151/84 - 157/90)  BP(mean): --  RR: 18 (19 Dec 2022 09:45) (18 - 18)  SpO2: 100% (19 Dec 2022 09:45) (97% - 100%)  CAPILLARY BLOOD GLUCOSE        I&O's Summary      General: frail appearing man sitting up NAD, awake and alert  Eyes: conjunctiva clear, nonicteric sclera  Respiratory: No respiratory distress, CTABL, No rales, rhonchi, wheezing.  Cardiovascular: S1,S2; Regular rate and rhythm; No m/g/r. 2+ peripheral pulses  Gastrointestinal: Soft, mild tenderness throughout the abdomen, Nondistended; +BS.   Extremities: No c/c/e; warm to touch  Skin: No rashes, No erythema   Psych: appropriate mood and affect    LABS:                        9.7    14.14 )-----------( 246      ( 19 Dec 2022 03:00 )             30.0     12-    142  |  110<H>  |  58<H>  ----------------------------<  159<H>  3.3<L>   |  16<L>  |  5.79<H>    Ca    6.6<L>      19 Dec 2022 03:00  Phos  6.3     12-  Mg     1.90     12-    TPro  5.2<L>  /  Alb  1.7<L>  /  TBili  <0.2  /  DBili  x   /  AST  16  /  ALT  22  /  AlkPhos  162<H>  12-19      CARDIAC MARKERS ( 18 Dec 2022 00:50 )  x     / x     / 32 U/L / x     / 4.7 ng/mL      Urinalysis Basic - ( 18 Dec 2022 00:40 )    Color: Light Yellow / Appearance: Clear / S.013 / pH: x  Gluc: x / Ketone: Negative  / Bili: Negative / Urobili: <2 mg/dL   Blood: x / Protein: 30 mg/dL / Nitrite: Negative   Leuk Esterase: Negative / RBC: 2 /HPF / WBC 1 /HPF   Sq Epi: x / Non Sq Epi: 1 /HPF / Bacteria: Negative        RADIOLOGY & ADDITIONAL TESTS:    Imaging Personally Reviewed:    Consultant(s) Notes Reviewed:      Care Discussed with Consultants/Other Providers:

## 2022-12-19 NOTE — DIETITIAN INITIAL EVALUATION ADULT - REASON FOR ADMISSION
Digestive system disorder  62 year old male with Hx of esophageal stricture (s/p stent on 5/2022 by Dr. Hernandez?), Emphysema, HCV, chronic pancreatitis, presenting from [facility] for esophageal stent removal due to intractable n/v. Admitted to [facility] from 11/25-12/14 for ams 2/2 Septic shock due to psuedomonal PNA, Lung abscess, morganella bacteremia and kelbsiella UTI c/b ATN requiring HD and intractable n/v.

## 2022-12-19 NOTE — DIETITIAN INITIAL EVALUATION ADULT - PERTINENT LABORATORY DATA
12-19    142  |  110<H>  |  58<H>  ----------------------------<  159<H>  3.3<L>   |  16<L>  |  5.79<H>    Ca    6.6<L>      19 Dec 2022 03:00  Phos  6.3     12-19  Mg     1.90     12-19    TPro  5.2<L>  /  Alb  1.7<L>  /  TBili  <0.2  /  DBili  x   /  AST  16  /  ALT  22  /  AlkPhos  162<H>  12-19  A1C with Estimated Average Glucose Result: 5.7 % (09-01-22 @ 08:58)  A1C with Estimated Average Glucose Result: 5.5 % (04-29-22 @ 05:36)  A1C with Estimated Average Glucose Result: 5.5 % (02-09-22 @ 17:47)

## 2022-12-19 NOTE — PROGRESS NOTE ADULT - PROBLEM SELECTOR PLAN 2
In the setting of IV bicarb therapy and poor oral intake and hypomagnesemia. Serum potassium is low at 3.3 today. Recommend discontinue bicarbonate therapy.  Can replete potassium but recommend to repeat.

## 2022-12-19 NOTE — PROGRESS NOTE ADULT - PROBLEM SELECTOR PLAN 3
In the setting of IV bicarb therapy and CKD. Serum Harry is low at 6.6(corrected at 8.5) today. Check ionized Calcium. Monitor serum calcium.

## 2022-12-19 NOTE — DIETITIAN NUTRITION RISK NOTIFICATION - TREATMENT: THE FOLLOWING DIET HAS BEEN RECOMMENDED
Diet, Regular:   DASH/TLC {Sodium & Cholesterol Restricted} (DASH)  Low Fat (LOWFAT) (12-18-22 @ 18:23) [Active]

## 2022-12-19 NOTE — PROGRESS NOTE ADULT - PROBLEM SELECTOR PLAN 1
Pt with YUNIEL on CKD likely multifactorial in the setting of recent COVID infection, poor oral intake. Scr was elevated at 2.20 in September 2022.  Pt was recently admitted at [facility]. Scr on admission was significantly elevated at 8.94 on 11/25/22. Pt was seen by in-house nephrologist Dr. Dukes. Pt received 1st HD session on 11/26/22. Last HD session was done on 12/1/22. HD catheter was removed as kidney function was recovering. Scr was elevated at 5.28 on transfer from [facility] on 12/15/22. Scr remained elevated at 5.79 today. No urgent indication for HD today.  Of note kidney sonogram of discreptant size with elevated blood pressure.  Recommend check renal artery duplex to evaluate for renal artery stenosis.  Will continue to monitor for kidney function recovery.  Encourage PO intake.  Monitor BMP.

## 2022-12-19 NOTE — PROGRESS NOTE ADULT - PROBLEM SELECTOR PLAN 4
S/p HD X 3  in ICU in [facility]   Cr plateaued at 5.7-6, off HD now  Avoid nephrotoxic, no contrast , no NSAID  [ ] [facility] renal duplex to eval for HESHAM  Renally dose medications   nephro following    #anemia of chronic disease  s/p 1u prbc in [facility] and s/p Retacrit IV Fe    #Hypocalcemia  - Calcium 6.6 on labs. Corrected calcium is 8.8  - will obtain ionized Ca  - Continue to monitor

## 2022-12-19 NOTE — PROGRESS NOTE ADULT - PROBLEM SELECTOR PLAN 1
Hx of esophageal stricture (s/p stent on 5/2022 by Dr. Hernandez?)   intractable N/V, transferred to [facility] for esophageal stent removal by Dr. Erickson - performed showing severe esophagitis  -  -GI Consulted now s/p stent removal   - c/w PPI and Carafate  - Tramadol PRN for pain, has been requiring PRN Dilaudid    - now on regular diet and tolerating without issues

## 2022-12-19 NOTE — PROGRESS NOTE ADULT - PROBLEM SELECTOR PLAN 3
COVID + on 12/13   Monitor O2 saturation, Maintain O2 saturation > 95%, O2 PRN   Pt has rhonchi on exam likely from lung abscess- Chest PT BID, Mucinex BID  monitor for symptoms  As per ID in [facility] on 12/14- No role for covid 19 treatment at this time.  D-dimer elevated to 1757, however it is decreased from 11/30 when it was 3160  c/w HSQ

## 2022-12-19 NOTE — DIETITIAN INITIAL EVALUATION ADULT - NS FNS DIET ORDER
Diet, Regular:   DASH/TLC {Sodium & Cholesterol Restricted} (DASH)  Low Fat (LOWFAT) (12-18-22 @ 18:23)

## 2022-12-19 NOTE — PROGRESS NOTE ADULT - ASSESSMENT
62 year old male with Hx of esophageal stricture (s/p stent on 5/2022 by Dr. Hernandez?), Emphysema, HCV, chronic pancreatitis, presenting from [facility] for esophageal stent removal due to intractable n/v. Admitted to [facility] from 11/25-12/14 for ams 2/2 Septic shock due to psuedomonal PNA, Lung abscess, morganella bacteremia and kelbsiella UTI c/b ATN requiring HD and intractable n/v.

## 2022-12-19 NOTE — DIETITIAN INITIAL EVALUATION ADULT - NS FNS WEIGHT CHANGE REASON
Per HIE 2022 wt data:  12/15: 59.1 kg   11/25: 36.3 kg   9/20: 53.5 kg   8/30: 59 kg     11/25 wt may be erroneous

## 2022-12-19 NOTE — PROGRESS NOTE ADULT - SUBJECTIVE AND OBJECTIVE BOX
[facility] Division of Kidney Diseases & Hypertension  FOLLOW UP NOTE  --------------------------------------------------------------------------------  HPI: Pt is a 62-year-old Male with Hx of esophageal stricture (S/p stenting in 5/22), Emphysema, HCV, chronic pancreatitis who initially presented to [facility] on 11/25/22 for AMS. Pt was admitted to [facility] on 11/25 for AMS 2/2 Septic shock due to Pseudomonal PNA, Lung abscess, Morganella bacteremia and klebsiella UTI. Pt was initially intubated for acute respiratory failure and required pressors for hypotension. Pt's hospital course was complicated by YUNIEL/ATN requiring HD. Pt was extubated and was transferred to [facility] for esophageal stent removal by Dr. Erickson. Pt was also found to be COVID + on 12/13/22. Initial labs on this admission concerning for elevated Scr. Nephrology team following for YUNIEL on CKD.     On review of [facility], it was noted that Scr was elevated at 2.20 in September 2022. Scr was elevated at 3.92 on 8/30/22. Scr elevated / improved to 3.38 on 9/5/22. pt was recently admitted at [facility]. Scr on admission was significantly elevated at 8.94 on 11/25/22. Pt was seen by in-house nephrologist Dr. Dukes. Pt received 1st HD session on 11/26/22. Last HD session was done on 12/1/22. HD catheter was removed as kidney function was recovering. Scr was elevated at 5.28 on transfer from [facility] on 12/15/22. Scr remained elevated at 5.56 today. CT scan abdo done on 12/5/22 showed L kidney cyst but no hydronephrosis. UA done on 11/25/22 showed proteinuria. Pt says he received HD ~ 2 years for similar symptoms. Pt denies seeing a nephrology after discharge (2 years ago).      Pt. seen and examined at bedside. Pt. reports feeling hungry and has voracious appetite (finished two breakfast trays) Denies any SOB, CP, HA, N/V, abdominal pain , urinary symptoms or dizziness.     PAST HISTORY  --------------------------------------------------------------------------------  No significant changes to PMH, PSH, FHx, SHx, unless otherwise noted    ALLERGIES & MEDICATIONS  --------------------------------------------------------------------------------  Allergies    Tylenol (Unknown)    Intolerances      Standing Inpatient Medications  benzocaine 15 mG/menthol 3.6 mG Lozenge 1 Lozenge Oral once  bisacodyl 5 milliGRAM(s) Oral at bedtime  calcium carbonate    500 mG (Tums) Chewable 1 Tablet(s) Chew once  folic acid 1 milliGRAM(s) Oral daily  heparin   Injectable 5000 Unit(s) SubCutaneous every 8 hours  NIFEdipine XL 30 milliGRAM(s) Oral daily  pantoprazole  Injectable 40 milliGRAM(s) IV Push two times a day  piperacillin/tazobactam IVPB.. 3.375 Gram(s) IV Intermittent every 12 hours  potassium chloride   Powder 40 milliEquivalent(s) Oral once  potassium chloride  20 mEq/100 mL IVPB 20 milliEquivalent(s) IV Intermittent every 2 hours  potassium chloride  20 mEq/100 mL IVPB 20 milliEquivalent(s) IV Intermittent once  sodium bicarbonate  Infusion 0.19 mEq/kG/Hr IV Continuous <Continuous>  sucralfate suspension 1 Gram(s) Oral every 6 hours  thiamine 100 milliGRAM(s) Oral daily    PRN Inpatient Medications  cyclobenzaprine 5 milliGRAM(s) Oral three times a day PRN  ondansetron Injectable 4 milliGRAM(s) IV Push every 8 hours PRN  traMADol 25 milliGRAM(s) Oral every 8 hours PRN      REVIEW OF SYSTEMS  --------------------------------------------------------------------------------  Gen: no fever  Respiratory: no sob  CV: no cp  GI: no ab pain good appetite  : no complaints  MSK: no pain     VITALS/PHYSICAL EXAM  --------------------------------------------------------------------------------  T(C): 36.7 (12-19-22 @ 09:45), Max: 36.7 (12-18-22 @ 17:30)  HR: 95 (12-19-22 @ 09:45) (85 - 95)  BP: 151/84 (12-19-22 @ 09:45) (138/82 - 157/90)  ABP: --  ABP(mean): --  RR: 18 (12-19-22 @ 09:45) (18 - 18)  SpO2: 100% (12-19-22 @ 09:45) (97% - 100%)  CVP(mm Hg): --        Physical Exam:  	Gen: NAD, cachetic  	HEENT: dry mucus membrane  	Pulm: CTA B/L  	CV: S1S2  	Abd: Soft, +BS   	Ext: No LE edema B/L  	Neuro: Awake  	Skin: Warm and dry  	Vascular access: IV peripheral canula    LABS/STUDIES  --------------------------------------------------------------------------------              9.7    14.14 >-----------<  246      [12-19-22 @ 03:00]              30.0     142  |  110  |  58  ----------------------------<  159      [12-19-22 @ 03:00]  3.3   |  16  |  5.79        Ca     6.6     [12-19-22 @ 03:00]      Mg     1.90     [12-19-22 @ 03:00]      Phos  6.3     [12-19-22 @ 03:00]    TPro  5.2  /  Alb  1.7  /  TBili  <0.2  /  DBili  x   /  AST  16  /  ALT  22  /  AlkPhos  162  [12-19-22 @ 03:00]        CK 32      [12-18-22 @ 00:50]    Creatinine Trend:  SCr 5.79 [12-19 @ 03:00]  SCr 5.95 [12-18 @ 16:00]  SCr 5.89 [12-18 @ 00:50]  SCr 5.56 [12-16 @ 07:33]  SCr 5.28 [12-15 @ 11:30]    Urinalysis - [12-18-22 @ 00:40]      Color Light Yellow / Appearance Clear / SG 1.013 / pH 6.0      Gluc Negative / Ketone Negative  / Bili Negative / Urobili <2 mg/dL       Blood Trace / Protein 30 mg/dL / Leuk Est Negative / Nitrite Negative      RBC 2 / WBC 1 / Hyaline  / Gran  / Sq Epi  / Non Sq Epi 1 / Bacteria Negative    Urine Creatinine 46      [12-18-22 @ 00:40]  Urine Protein 34      [12-18-22 @ 00:40]

## 2022-12-19 NOTE — DIETITIAN INITIAL EVALUATION ADULT - OTHER INFO
A&Ox4. Pt eating 100% of meals and asking for multiple trays and snacks. +abd pain. Denies nausea/vomiting/diarrhea/constipation. BM today. Denies any chewing or swallowing difficulties at this time. Noted, that the patient was admitted for esophageal stent removal.   Pt with multiple recent RD notes. Low BMI may be due to food insecurity/ poor access to food PTA; however, pt is a poor historian. Pt a/w chronic pancreatitis.

## 2022-12-19 NOTE — DIETITIAN INITIAL EVALUATION ADULT - ADD RECOMMEND
1. Encourage PO intake and honor food preferences as able.   2. Continue to document PO in RN flow sheets and monitor weekly weights.   3. Continue thiamine/ folic acid supplementation.   4. Multivitamin daily.

## 2022-12-19 NOTE — DIETITIAN INITIAL EVALUATION ADULT - PERTINENT MEDS FT
MEDICATIONS  (STANDING):  bisacodyl 5 milliGRAM(s) Oral at bedtime  calcium carbonate    500 mG (Tums) Chewable 1 Tablet(s) Chew once  folic acid 1 milliGRAM(s) Oral daily  heparin   Injectable 5000 Unit(s) SubCutaneous every 8 hours  NIFEdipine XL 30 milliGRAM(s) Oral daily  pantoprazole  Injectable 40 milliGRAM(s) IV Push two times a day  piperacillin/tazobactam IVPB.. 3.375 Gram(s) IV Intermittent every 12 hours  potassium chloride   Powder 40 milliEquivalent(s) Oral once  sucralfate suspension 1 Gram(s) Oral every 6 hours  thiamine 100 milliGRAM(s) Oral daily    MEDICATIONS  (PRN):  cyclobenzaprine 5 milliGRAM(s) Oral three times a day PRN Muscle Spasm  ondansetron Injectable 4 milliGRAM(s) IV Push every 8 hours PRN Nausea and/or Vomiting  traMADol 25 milliGRAM(s) Oral every 8 hours PRN Moderate Pain (4 - 6)

## 2022-12-20 NOTE — PROGRESS NOTE ADULT - ASSESSMENT
62 year old male with Hx of esophageal stricture (s/p stent on 5/2022 by Dr. Hernandez?), Emphysema, HCV, chronic pancreatitis, presenting from [facility] for esophageal stent removal due to intractable n/v. Admitted to [facility] from 11/25-12/14 for ams 2/2 Septic shock due to psuedomonal PNA, Lung abscess, morganella bacteremia and kelbsiella UTI c/b ATN requiring HD and intractable n/v.

## 2022-12-20 NOTE — PROGRESS NOTE ADULT - PROBLEM SELECTOR PLAN 4
pt. with low serum CO2 at 17 today. Please check pH level. If low then will consider starting pt. on PO sodium bicarbonate tabs. Monitor serum CO2.     If any questions, please feel free to contact me     Marco Antonio Choi  Nephrology Fellow  [facility] Pager: [phone] pt. with low serum CO2 at 17 today. Please check pH level.     If any questions, please feel free to contact me     Marco Antonio Choi  Nephrology Fellow  [facility] Pager: [phone]

## 2022-12-20 NOTE — PROGRESS NOTE ADULT - PROBLEM SELECTOR PLAN 3
In the setting of IV bicarb therapy and CKD. Serum Harry is low at 6.6(corrected at 8.5) today. Please check ionized Calcium. Monitor serum calcium.

## 2022-12-20 NOTE — PROGRESS NOTE ADULT - PROBLEM SELECTOR PLAN 2
pt. with hypokalemia in setting of IV bicarb therapy and poor oral intake and hypomagnesemia. Bicarbonate infusion was stopped and serum potassium improved to 3.9 today. Monitor serum potassium

## 2022-12-20 NOTE — PROGRESS NOTE ADULT - PROBLEM SELECTOR PLAN 1
Pt with YUNIEL on CKD likely multifactorial in the setting of recent COVID infection, poor oral intake. Scr was elevated at 2.20 in September 2022. Pt was recently admitted at [facility]. Scr on admission was significantly elevated at 8.94 on 11/25/22. Pt received 1st HD session on 11/26/22. Last HD session was done on 12/1/22. HD catheter was removed as kidney function was recovering. Scr was elevated at 5.28 on transfer from [facility] on 12/15/22. Scr remains elevated/stable at 5.39 today. No urgent indication for HD today. Of note kidney sonogram of discrepant size with elevated blood pressure, pending renal artery duplex to evaluate for renal artery stenosis. Will continue to monitor for kidney function recovery. Encourage PO intake.  Monitor BMP. Pt with YUNIEL on CKD likely multifactorial in the setting of recent COVID infection, poor oral intake. Scr was elevated at 2.20 in September 2022. Pt was recently admitted at [facility]. Scr on admission was significantly elevated at 8.94 on 11/25/22. Pt received 1st HD session on 11/26/22. Last HD session was done on 12/1/22. HD catheter was removed as kidney function was recovering. Scr was elevated at 5.28 on transfer from [facility] on 12/15/22. Scr remains elevated/stable at 5.39 today but trending down overall. No urgent indication for HD today. Of note kidney sonogram of discrepant size with elevated blood pressure, pending renal artery duplex to evaluate for renal artery stenosis. Will continue to monitor for kidney function recovery. Encourage PO intake.  Monitor BMP.

## 2022-12-20 NOTE — PROGRESS NOTE ADULT - SUBJECTIVE AND OBJECTIVE BOX
[facility] DIVISION OF KIDNEY DISEASES AND HYPERTENSION -- FOLLOW UP NOTE  --------------------------------------------------------------------------------  HPI: Pt is a 62-year-old Male with Hx of esophageal stricture (S/p stenting in 5/22), Emphysema, HCV, chronic pancreatitis who initially presented to [facility] on 11/25/22 for AMS. Pt was admitted to [facility] on 11/25 for AMS 2/2 Septic shock.. Pt was initially intubated for acute respiratory failure and required pressors for hypotension. Pt's hospital course was complicated by YUNIEL/ATN requiring HD. Pt was extubated and was transferred to [facility] for esophageal stent removal by Dr. Erickson. Initial labs on this admission concerning for elevated Scr. Nephrology team following for YUNIEL on CKD.     On review of [facility], it was noted that Scr was elevated at 2.20 in September 2022. Scr on admission was significantly elevated at 8.94 on 11/25/22. Pt received 1st HD session on 11/26/22. Last HD session was done on 12/1/22. HD catheter was removed as kidney function was recovering. Scr was elevated at 5.28 on transfer from [facility] on 12/15/22. Scr remains elevated/stable at 5.39 today. CT scan abdo done on 12/5/22 showed L kidney cyst but no hydronephrosis. UA done on 11/25/22 showed proteinuria. Of note, Pt says he received HD ~ 2 years for similar symptoms. Pt denies seeing a nephrology after discharge (2 years ago).      Pt. seen and examined at bedside. He states he is feeling okay with some back pain but otherwise reports feeling better. He denied any chest pain, shortness of breath, nausea, or vomiting.     PAST HISTORY  --------------------------------------------------------------------------------  No significant changes to PMH, PSH, FHx, SHx, unless otherwise noted    ALLERGIES & MEDICATIONS  --------------------------------------------------------------------------------  Allergies    Tylenol (Unknown)    Intolerances      Standing Inpatient Medications  bisacodyl 5 milliGRAM(s) Oral at bedtime  calcium carbonate    500 mG (Tums) Chewable 1 Tablet(s) Chew once  folic acid 1 milliGRAM(s) Oral daily  heparin   Injectable 5000 Unit(s) SubCutaneous every 8 hours  NIFEdipine XL 30 milliGRAM(s) Oral daily  pantoprazole  Injectable 40 milliGRAM(s) IV Push two times a day  piperacillin/tazobactam IVPB.. 3.375 Gram(s) IV Intermittent every 12 hours  potassium chloride   Powder 40 milliEquivalent(s) Oral once  sucralfate suspension 1 Gram(s) Oral every 6 hours  thiamine 100 milliGRAM(s) Oral daily    PRN Inpatient Medications  cyclobenzaprine 5 milliGRAM(s) Oral three times a day PRN  ondansetron Injectable 4 milliGRAM(s) IV Push every 8 hours PRN  traMADol 25 milliGRAM(s) Oral every 8 hours PRN      REVIEW OF SYSTEMS    All other systems were reviewed and are negative, except as noted.    VITALS/PHYSICAL EXAM  --------------------------------------------------------------------------------  T(C): 36.4 (12-20-22 @ 10:15), Max: 36.8 (12-19-22 @ 17:38)  HR: 91 (12-20-22 @ 10:15) (83 - 92)  BP: 143/91 (12-20-22 @ 10:15) (143/91 - 165/90)  RR: 18 (12-20-22 @ 10:15) (18 - 18)  SpO2: 100% (12-20-22 @ 10:15) (99% - 100%)  Wt(kg): --      12-19-22 @ 07:01  -  12-20-22 @ 07:00  --------------------------------------------------------  IN: 1500 mL / OUT: 600 mL / NET: 900 mL      Physical Exam:  	Gen: ill appearing, frail  	HEENT: MMM  	Pulm: coarse breath sounds  	CV: S1S2  	Abd: Soft, +BS   	Ext: No LE edema B/L  	Neuro: Awake and alert   	Skin: Warm and dry    LABS/STUDIES  --------------------------------------------------------------------------------              9.7    14.14 >-----------<  246      [12-19-22 @ 03:00]              30.0     143  |  110  |  57  ----------------------------<  78      [12-19-22 @ 17:31]  3.9   |  17  |  5.39        Ca     6.6     [12-19-22 @ 17:31]      Mg     2.10     [12-19-22 @ 17:31]      Phos  5.2     [12-19-22 @ 17:31]    TPro  5.2  /  Alb  1.7  /  TBili  <0.2  /  DBili  x   /  AST  16  /  ALT  22  /  AlkPhos  162  [12-19-22 @ 03:00]    Creatinine Trend:  SCr 5.39 [12-19 @ 17:31]  SCr 5.79 [12-19 @ 03:00]  SCr 5.95 [12-18 @ 16:00]  SCr 5.89 [12-18 @ 00:50]  SCr 5.56 [12-16 @ 07:33]    Urinalysis - [12-18-22 @ 00:40]      Color Light Yellow / Appearance Clear / SG 1.013 / pH 6.0      Gluc Negative / Ketone Negative  / Bili Negative / Urobili <2 mg/dL       Blood Trace / Protein 30 mg/dL / Leuk Est Negative / Nitrite Negative      RBC 2 / WBC 1 / Hyaline  / Gran  / Sq Epi  / Non Sq Epi 1 / Bacteria Negative    Urine Creatinine 46      [12-18-22 @ 00:40]  Urine Protein 34      [12-18-22 @ 00:40]    Iron 61, TIBC --, %sat --      [12-08-22 @ 08:25]  Ferritin 264      [12-08-22 @ 08:25]  HbA1c 5.0      [03-22-19 @ 09:03]  TSH 1.380      [09-01-22 @ 08:58]  Lipid: chol 147, TG 88, HDL 59, LDL --      [09-01-22 @ 08:58]    HBsAb 15.0      [11-26-22 @ 18:20]  HBsAg Nonreact      [11-27-22 @ 15:00]  HCV 4.97, Reactive      [11-27-22 @ 15:00]  HIV Nonreact      [11-25-22 @ 11:56]    Free Light Chains: kappa 13.31, lambda 12.94, ratio = 1.03      [12-07 @ 05:35]  Immunofixation Serum: No Monoclonal Band Identified    Reference Range: None Detected      [12-07-22 @ 05:35]  SPEP Interpretation: Hypoalbuminemia      [12-07-22 @ 05:35]

## 2022-12-20 NOTE — PROGRESS NOTE ADULT - PROBLEM SELECTOR PLAN 1
Hx of esophageal stricture (s/p stent on 5/2022 by Dr. Hernandez?)   intractable N/V, transferred to [facility] for esophageal stent removal by Dr. Erickson - performed showing severe esophagitis  - GI Consulted now s/p stent removal   - c/w PPI and Carafate  - will resume home oxycodone 15 mg TID, has been requiring PRN Dilaudid. Plan to titrate off IV medication after today, discussed with patient who is in agreement.   - now on regular diet and tolerating without issues

## 2022-12-20 NOTE — PROGRESS NOTE ADULT - SUBJECTIVE AND OBJECTIVE BOX
Patient is a 62y old  Male who presents with a chief complaint of intractable N/V , esophageal stent removal (20 Dec 2022 11:00)    Canelo Herrera MD   [facility] Division of Hospital Medicine   Pager [phone]  Reachable on [employer] Teams     SUBJECTIVE / OVERNIGHT EVENTS:  Patient seen and examined this morning. He states that pain is improved since procedure, but states that pain is still present and need the liquid medication. Discussed need to transition to PO regimen given his improvement.   Denies nausea and vomiting. Has been tolerating PO without issues.     MEDICATIONS  (STANDING):  bisacodyl 5 milliGRAM(s) Oral at bedtime  calcium carbonate    500 mG (Tums) Chewable 1 Tablet(s) Chew once  folic acid 1 milliGRAM(s) Oral daily  heparin   Injectable 5000 Unit(s) SubCutaneous every 8 hours  NIFEdipine XL 30 milliGRAM(s) Oral daily  pantoprazole  Injectable 40 milliGRAM(s) IV Push two times a day  piperacillin/tazobactam IVPB.. 3.375 Gram(s) IV Intermittent every 12 hours  potassium chloride   Powder 40 milliEquivalent(s) Oral once  sucralfate suspension 1 Gram(s) Oral every 6 hours  thiamine 100 milliGRAM(s) Oral daily    MEDICATIONS  (PRN):  cyclobenzaprine 5 milliGRAM(s) Oral three times a day PRN Muscle Spasm  ondansetron Injectable 4 milliGRAM(s) IV Push every 8 hours PRN Nausea and/or Vomiting  traMADol 25 milliGRAM(s) Oral every 8 hours PRN Moderate Pain (4 - 6)      Vital Signs Last 24 Hrs  T(C): 36.4 (20 Dec 2022 10:15), Max: 36.8 (19 Dec 2022 17:38)  T(F): 97.6 (20 Dec 2022 10:15), Max: 98.2 (19 Dec 2022 17:38)  HR: 91 (20 Dec 2022 10:15) (83 - 92)  BP: 143/91 (20 Dec 2022 10:15) (143/91 - 165/90)  BP(mean): --  RR: 18 (20 Dec 2022 10:15) (18 - 18)  SpO2: 100% (20 Dec 2022 10:15) (99% - 100%)  CAPILLARY BLOOD GLUCOSE        I&O's Summary    19 Dec 2022 07:01  -  20 Dec 2022 07:00  --------------------------------------------------------  IN: 1500 mL / OUT: 600 mL / NET: 900 mL        General: frail appearing man sitting up NAD, awake and alert  Eyes: conjunctiva clear, nonicteric sclera  Respiratory: No respiratory distress, CTABL, No rales, rhonchi, wheezing.  Cardiovascular: S1,S2; Regular rate and rhythm; No m/g/r. 2+ peripheral pulses  Gastrointestinal: Soft, mild tenderness throughout the abdomen, Nondistended; +BS.   Extremities: No c/c/e; warm to touch  Skin: No rashes, No erythema   Psych: appropriate mood and affect      LABS:                        8.9    11.05 )-----------( 217      ( 20 Dec 2022 11:15 )             28.3     12-20    140  |  110<H>  |  53<H>  ----------------------------<  203<H>  3.4<L>   |  18<L>  |  5.38<H>    Ca    6.8<L>      20 Dec 2022 11:15  Phos  4.7     12-20  Mg     1.90     12-20    TPro  4.9<L>  /  Alb  1.7<L>  /  TBili  <0.2  /  DBili  <0.2  /  AST  15  /  ALT  22  /  AlkPhos  154<H>  12-20              RADIOLOGY & ADDITIONAL TESTS:    Imaging Personally Reviewed:    Consultant(s) Notes Reviewed:      Care Discussed with Consultants/Other Providers:

## 2022-12-20 NOTE — PROGRESS NOTE ADULT - ATTENDING COMMENTS
Patient with YUNIEL slowly improving.  no evidence of volume overload.  encourage PO intake.  will expand glomerular / serologic work up.  Please check serum PTH.

## 2022-12-20 NOTE — PROGRESS NOTE ADULT - PROBLEM SELECTOR PLAN 4
S/p HD X 3  in ICU in [facility]   Cr plateaued at 5.7-6, off HD now  Avoid nephrotoxic, no contrast , no NSAID  [ ] [facility] duplex to eval for HESHAM  Renally dose medications   nephro following    #anemia of chronic disease  s/p 1u prbc in [facility] and s/p Retacrit IV Fe    #Hypocalcemia  - Calcium 6.6 on labs. Corrected calcium is 8.8  - will obtain ionized Ca  - Continue to monitor

## 2022-12-21 NOTE — PROGRESS NOTE ADULT - SUBJECTIVE AND OBJECTIVE BOX
Patient is a 62y old  Male who presents with a chief complaint of intractable N/V , esophageal stent removal (21 Dec 2022 11:22)    Canelo Herrera MD   [facility] Division of Hospital Medicine   Pager [phone]  Reachable on [employer] Teams     SUBJECTIVE / OVERNIGHT EVENTS:  Patient seen and examined this morning. He states continues to complain of pain in his epigastric area.  When asking about chest pain patient responded 3 times to discuss his dose of oxycodone.   He initially states that he did not have any chest pain, however after discussing that I did not think that escalating his narcotic dose would be in his best interest, he did subsequently endorse pain.  No palpitations, no fevers and chills.     He has been tolerating diet well frequently asking for extra trays of food.        MEDICATIONS  (STANDING):  bisacodyl 5 milliGRAM(s) Oral at bedtime  folic acid 1 milliGRAM(s) Oral daily  heparin   Injectable 5000 Unit(s) SubCutaneous every 8 hours  NIFEdipine XL 30 milliGRAM(s) Oral daily  pantoprazole  Injectable 40 milliGRAM(s) IV Push two times a day  piperacillin/tazobactam IVPB.. 3.375 Gram(s) IV Intermittent every 12 hours  senna 2 Tablet(s) Oral at bedtime  sucralfate suspension 1 Gram(s) Oral every 6 hours  thiamine 100 milliGRAM(s) Oral daily    MEDICATIONS  (PRN):  acetaminophen     Tablet .. 650 milliGRAM(s) Oral every 6 hours PRN Mild Pain (1 - 3), Moderate Pain (4 - 6)  cyclobenzaprine 5 milliGRAM(s) Oral three times a day PRN Muscle Spasm  ondansetron Injectable 4 milliGRAM(s) IV Push every 8 hours PRN Nausea and/or Vomiting  oxyCODONE    IR 15 milliGRAM(s) Oral every 8 hours PRN mod-severe pain      Vital Signs Last 24 Hrs  T(C): 36.8 (21 Dec 2022 06:02), Max: 36.8 (21 Dec 2022 06:02)  T(F): 98.2 (21 Dec 2022 06:02), Max: 98.2 (21 Dec 2022 06:02)  HR: 91 (21 Dec 2022 06:02) (91 - 91)  BP: 159/87 (21 Dec 2022 06:02) (152/86 - 171/81)  BP(mean): --  RR: 18 (21 Dec 2022 06:02) (18 - 18)  SpO2: 100% (21 Dec 2022 06:02) (100% - 100%)  CAPILLARY BLOOD GLUCOSE        I&O's Summary      General: frail appearing man sitting up NAD, awake and alert  Eyes: conjunctiva clear, nonicteric sclera  Respiratory: No respiratory distress, CTABL, No rales, rhonchi, wheezing.  Cardiovascular: S1,S2; Regular rate and rhythm; No m/g/r. 2+ peripheral pulses  Gastrointestinal: Soft, mild tenderness throughout the abdomen, Nondistended; +BS.   Extremities: No c/c/e; warm to touch  Skin: No rashes, No erythema   Psych: appropriate mood and affect      LABS:                        10.0   12.42 )-----------( 263      ( 21 Dec 2022 08:04 )             31.3     12-21    140  |  108<H>  |  53<H>  ----------------------------<  59<L>  4.1   |  18<L>  |  5.57<H>    Ca    7.5<L>      21 Dec 2022 08:04  Phos  5.5     12-21  Mg     1.90     12-21    TPro  5.7<L>  /  Alb  2.0<L>  /  TBili  <0.2  /  DBili  <0.2  /  AST  14  /  ALT  24  /  AlkPhos  177<H>  12-21              RADIOLOGY & ADDITIONAL TESTS:    Imaging Personally Reviewed:    Consultant(s) Notes Reviewed:      Care Discussed with Consultants/Other Providers:

## 2022-12-21 NOTE — PROGRESS NOTE ADULT - SUBJECTIVE AND OBJECTIVE BOX
[facility] Division of Kidney Diseases & Hypertension  FOLLOW UP NOTE  --------------------------------------------------------------------------------  HPI: Pt is a 62-year-old Male with Hx of esophageal stricture (S/p stenting in 5/22), Emphysema, HCV, chronic pancreatitis who initially presented to [facility] on 11/25/22 for AMS. Pt was admitted to [facility] on 11/25 for AMS 2/2 Septic shock.. Pt was initially intubated for acute respiratory failure and required pressors for hypotension. Pt's hospital course was complicated by YUNIEL/ATN requiring HD. Pt was extubated and was transferred to [facility] for esophageal stent removal by Dr. Erickson. Initial labs on this admission concerning for elevated Scr. Nephrology team following for YUNIEL on CKD.     On review of [facility], it was noted that Scr was elevated at 2.20 in September 2022. Scr on admission was significantly elevated at 8.94 on 11/25/22. Pt received 1st HD session on 11/26/22. Last HD session was done on 12/1/22. HD catheter was removed as kidney function was recovering. Scr was elevated at 5.28 on transfer from [facility] on 12/15/22. Scr remains elevated/stable at 5 range today. CT scan abdo done on 12/5/22 showed L kidney cyst but no hydronephrosis. UA done on 11/25/22 showed proteinuria. Of note, Pt says he received HD ~ 2 years for similar symptoms.     Pt. seen and examined at bedside. He states he is feeling okay no chest pain no nausea feels very hungry.    PAST HISTORY  --------------------------------------------------------------------------------  No significant changes to PMH, PSH, FHx, SHx, unless otherwise noted    ALLERGIES & MEDICATIONS  --------------------------------------------------------------------------------  Allergies    Tylenol (Unknown)    Intolerances      Standing Inpatient Medications  bisacodyl 5 milliGRAM(s) Oral at bedtime  folic acid 1 milliGRAM(s) Oral daily  heparin   Injectable 5000 Unit(s) SubCutaneous every 8 hours  NIFEdipine XL 30 milliGRAM(s) Oral daily  pantoprazole  Injectable 40 milliGRAM(s) IV Push two times a day  piperacillin/tazobactam IVPB.. 3.375 Gram(s) IV Intermittent every 12 hours  senna 2 Tablet(s) Oral at bedtime  sucralfate suspension 1 Gram(s) Oral every 6 hours  thiamine 100 milliGRAM(s) Oral daily    PRN Inpatient Medications  acetaminophen     Tablet .. 650 milliGRAM(s) Oral every 6 hours PRN  cyclobenzaprine 5 milliGRAM(s) Oral three times a day PRN  ondansetron Injectable 4 milliGRAM(s) IV Push every 8 hours PRN  oxyCODONE    IR 15 milliGRAM(s) Oral every 8 hours PRN      REVIEW OF SYSTEMS  --------------------------------------------------------------------------------  Gen: no fever  Respiratory: no sob   CV: no cp  GI: no ab pain + hiccups  : no complaints  MSK: no pain    VITALS/PHYSICAL EXAM  --------------------------------------------------------------------------------  T(C): 36.8 (12-21-22 @ 06:02), Max: 36.8 (12-21-22 @ 06:02)  HR: 91 (12-21-22 @ 06:02) (91 - 91)  BP: 159/87 (12-21-22 @ 06:02) (152/86 - 171/81)  ABP: --  ABP(mean): --  RR: 18 (12-21-22 @ 06:02) (18 - 18)  SpO2: 100% (12-21-22 @ 06:02) (100% - 100%)  CVP(mm Hg): --    Physical Exam:  	Gen: ill appearing, frail  	HEENT: MMM  	Pulm: coarse breath sounds  	CV: S1S2  	Abd: Soft, +BS   	Ext: No LE edema B/L  	Neuro: Awake and alert   	Skin: Warm and dry    LABS/STUDIES  --------------------------------------------------------------------------------              10.0   12.42 >-----------<  263      [12-21-22 @ 08:04]              31.3     140  |  108  |  53  ----------------------------<  59      [12-21-22 @ 08:04]  4.1   |  18  |  5.57        Ca     7.5     [12-21-22 @ 08:04]      iCa    0.96     [12-21 @ 08:04]      Mg     1.90     [12-21-22 @ 08:04]      Phos  5.5     [12-21-22 @ 08:04]    TPro  5.7  /  Alb  2.0  /  TBili  <0.2  /  DBili  <0.2  /  AST  14  /  ALT  24  /  AlkPhos  177  [12-21-22 @ 08:04]          Creatinine Trend:  SCr 5.57 [12-21 @ 08:04]  SCr 5.38 [12-20 @ 11:15]  SCr 5.39 [12-19 @ 17:31]  SCr 5.79 [12-19 @ 03:00]  SCr 5.95 [12-18 @ 16:00]    Urinalysis - [12-18-22 @ 00:40]      Color Light Yellow / Appearance Clear / SG 1.013 / pH 6.0      Gluc Negative / Ketone Negative  / Bili Negative / Urobili <2 mg/dL       Blood Trace / Protein 30 mg/dL / Leuk Est Negative / Nitrite Negative      RBC 2 / WBC 1 / Hyaline  / Gran  / Sq Epi  / Non Sq Epi 1 / Bacteria Negative    Urine Creatinine 46      [12-18-22 @ 00:40]  Urine Protein 34      [12-18-22 @ 00:40]

## 2022-12-21 NOTE — PROGRESS NOTE ADULT - PROBLEM SELECTOR PLAN 1
Hx of esophageal stricture (s/p stent on 5/2022 by Dr. Hernandez?)   intractable N/V, transferred to [facility] for esophageal stent removal by Dr. Erickson - performed showing severe esophagitis  - GI Consulted now s/p stent removal   - c/w PPI and Carafate  - will resume home oxycodone 15 mg TID. Plan to titrate off IV medication today.   - now on regular diet and tolerating without issues    # Epigastric pain   - likely secondary to above  - trops elevated on admission 210->199   - EKG with T wave flattening in inferolateral leads, consistent with prior study.   - will check TTE, NST

## 2022-12-21 NOTE — PROGRESS NOTE ADULT - ASSESSMENT
62 year old male with Hx of esophageal stricture (s/p stent on 5/2022 by Dr. Hernandez?), Emphysema, HCV, chronic pancreatitis, presenting from [facility] for esophageal stent removal due to intractable n/v. Admitted to [facility] from 11/25-12/14 for ams 2/2 Septic shock due to psuedomonal PNA, Lung abscess, morganella bacteremia and kelbsiella UTI c/b ATN requiring HD and intractable n/v.

## 2022-12-21 NOTE — PROGRESS NOTE ADULT - PROBLEM SELECTOR PLAN 1
Pt with YUNIEL on CKD likely multifactorial in the setting of recent COVID infection, poor oral intake. Scr was elevated at 2.20 in September 2022. Pt was recently admitted at [facility]. Scr on admission was significantly elevated at 8.94 on 11/25/22. Pt received 1st HD session on 11/26/22. Last HD session was done on 12/1/22. HD catheter was removed as kidney function was recovering. Scr was elevated at 5.28 on transfer from [facility] on 12/15/22. Scr remains elevated/stable at 5.57 today. No urgent indication for HD today. Of note kidney sonogram of discrepant size with elevated blood pressure, pending renal artery duplex to evaluate for renal artery stenosis. I spoke with Dr. Dukes today (12/21) who knows the patient very well and has seen him in patient and outpatient over the last several years.  As per Dr. Hanks the patient's baseline creatinine is 3 and has advanced CKD and is nearing ESKD.  Discussed with Dr. Hanks that a vascular surgery evaluation for fistula would be appropriate when the patient is stable.

## 2022-12-21 NOTE — PROGRESS NOTE ADULT - PROBLEM SELECTOR PLAN 2
pt. with hypokalemia in setting of IV bicarb therapy and poor oral intake and hypomagnesemia. Bicarbonate infusion was stopped and serum potassium improved to 4.1 today. Monitor serum potassium

## 2022-12-21 NOTE — PROGRESS NOTE ADULT - PROBLEM SELECTOR PLAN 4
S/p HD X 3  in ICU in [facility]   Cr plateaued at 5.7-6, off HD now  Avoid nephrotoxic, no contrast , no NSAID  [ ] [facility] renal duplex to eval for HESHAM  Renally dose medications   nephro following    #anemia of chronic disease  s/p 1u prbc in [facility] and s/p Retacrit IV Fe    #Hypocalcemia  - Calcium 6.6 on labs. Corrected calcium is 8.8  - will obtain ionized Ca  - Continue to monitor

## 2022-12-21 NOTE — PROGRESS NOTE ADULT - PROBLEM SELECTOR PLAN 3
In the setting of IV bicarb therapy and CKD. Ionized calcium low.  Check PTH start po calcitriol 0.25 daily.  Monitor serum calcium daily nd correct for albumin.

## 2022-12-22 NOTE — PROGRESS NOTE ADULT - ASSESSMENT
62 year old male with Hx of esophageal stricture (s/p stent on 5/2022 by Dr. Hernandez?), Emphysema, HCV, chronic pancreatitis, presenting from [facility] for esophageal stent removal due to intractable n/v. Admitted to [facility] from 11/25-12/14 for ams 2/2 Septic shock due to psuedomonal PNA, Lung abscess, morganella bacteremia and kelbsiella UTI c/b ATN requiring HD and intractable n/v.

## 2022-12-22 NOTE — PROGRESS NOTE ADULT - PROBLEM SELECTOR PLAN 1
Hx of esophageal stricture (s/p stent on 5/2022 by Dr. Hernandez?)   intractable N/V, transferred to [facility] for esophageal stent removal by Dr. Erickson - performed showing severe esophagitis  - GI Consulted now s/p stent removal   - c/w PPI and Carafate  - will resume home oxycodone 15 mg TID. Plan to titrate off IV medication today.   - now on regular diet and tolerating without issues    # Epigastric pain   - likely secondary to above  - trops elevated on admission 210->199   - EKG with T wave flattening in inferolateral leads, consistent with prior study.   - will check TTE, NST

## 2022-12-22 NOTE — PROGRESS NOTE ADULT - PROBLEM SELECTOR PLAN 4
S/p HD X 3  in ICU in [facility]   Cr plateaued at 5.7-6, off HD now  Avoid nephrotoxic, no contrast , no NSAID  [ ] [facility] renal duplex to eval for HESHAM  Renally dose medications   nephro following    #anemia of chronic disease  [facility] and s/p Retacrit IV Fe    #Hypocalcemia  - inonidez calcium 0.96, slightly low  - Corrected calcium is normal   - started on calitriol  - Continue to monitor

## 2022-12-22 NOTE — PROGRESS NOTE ADULT - SUBJECTIVE AND OBJECTIVE BOX
Patient is a 62y old  Male who presents with a chief complaint of intractable N/V , esophageal stent removal (21 Dec 2022 15:14)    Canelo Herrera MD   [facility] Division of Hospital Medicine   Pager [phone]  Reachable on [employer] Teams     SUBJECTIVE / OVERNIGHT EVENTS:  Patient seen and examined this morning states that chest pain is better, however is still having some burning abdominal pain.     MEDICATIONS  (STANDING):  bisacodyl 5 milliGRAM(s) Oral at bedtime  calcitriol   Capsule 0.25 MICROGram(s) Oral daily  folic acid 1 milliGRAM(s) Oral daily  heparin   Injectable 5000 Unit(s) SubCutaneous every 8 hours  NIFEdipine XL 30 milliGRAM(s) Oral daily  pantoprazole  Injectable 40 milliGRAM(s) IV Push two times a day  piperacillin/tazobactam IVPB.. 3.375 Gram(s) IV Intermittent every 12 hours  senna 2 Tablet(s) Oral at bedtime  sucralfate suspension 1 Gram(s) Oral every 6 hours  thiamine 100 milliGRAM(s) Oral daily    MEDICATIONS  (PRN):  acetaminophen     Tablet .. 650 milliGRAM(s) Oral every 6 hours PRN Mild Pain (1 - 3), Moderate Pain (4 - 6)  cyclobenzaprine 5 milliGRAM(s) Oral three times a day PRN Muscle Spasm  ondansetron Injectable 4 milliGRAM(s) IV Push every 8 hours PRN Nausea and/or Vomiting  oxyCODONE    IR 15 milliGRAM(s) Oral every 8 hours PRN mod-severe pain      Vital Signs Last 24 Hrs  T(C): 36.7 (22 Dec 2022 15:17), Max: 37 (22 Dec 2022 06:30)  T(F): 98 (22 Dec 2022 15:17), Max: 98.6 (22 Dec 2022 06:30)  HR: 88 (22 Dec 2022 15:17) (82 - 103)  BP: 134/78 (22 Dec 2022 15:17) (130/88 - 160/90)  BP(mean): --  RR: 18 (22 Dec 2022 15:17) (18 - 18)  SpO2: 100% (22 Dec 2022 15:17) (100% - 100%)  CAPILLARY BLOOD GLUCOSE        I&O's Summary      General: frail appearing man sitting up in bed NAD, awake and alert  Eyes: conjunctiva clear, nonicteric sclera  Respiratory: No respiratory distress, CTABL, No rales, rhonchi, wheezing.  Cardiovascular: S1,S2; Regular rate and rhythm; No m/g/r. 2+ peripheral pulses  Gastrointestinal: midline surgical scar - healed, soft, mild tenderness throughout the abdomen, Nondistended; +BS.   Extremities: No c/c/e; warm to touch  Skin: No rashes, No erythema   Psych: appropriate mood and affect      LABS:                        9.0    14.08 )-----------( 282      ( 22 Dec 2022 06:40 )             28.3     12-22    143  |  110<H>  |  57<H>  ----------------------------<  49<LL>  3.8   |  18<L>  |  5.86<H>    Ca    7.4<L>      22 Dec 2022 06:40  Phos  6.1     12-22  Mg     1.80     12-22    TPro  5.7<L>  /  Alb  2.0<L>  /  TBili  <0.2  /  DBili  <0.2  /  AST  14  /  ALT  24  /  AlkPhos  177<H>  12-21              RADIOLOGY & ADDITIONAL TESTS:    Imaging Personally Reviewed:    Consultant(s) Notes Reviewed:  Nephro    Care Discussed with Consultants/Other Providers: NENA Hoyt

## 2022-12-23 NOTE — CONSULT NOTE ADULT - SUBJECTIVE AND OBJECTIVE BOX
HPI:  62 year old male with Hx of esophageal stricture (s/p stent on 5/2022 by Dr. Hernandez?), Emphysema, HCV, chronic pancreatitis, presenting from [facility] for esophageal stent removal due to intractable n/v. Pt was admitted to [facility] on 11/25 for AMS 2/2 Septic shock due to psuedomonal PNA, Lung abscess, morganella bacteremia and kelbsiella UTI. Pt was initially intubated for acute respiratory failure and required pressors for hypotension . Pt's hospital course was complicated by YUNIEL due to septic ATN requiring 3 rounds of HD. Pt is on now extubated, on 4L of NC, off pressors. Pt developed intractable N/V during his hospital stay and was transferred to [facility] for esophageal stent removal by Dr. Erickson.  Pt was also found to be COVID + on 12/13/22.   Pt alert, awake, AOx3 when seen. Pt states he feels cold and is aggravated. Pt demanding his pain medications for esophageal and body aches.   Denies chest pain, shortness of breath.      (15 Dec 2022 09:21)    ID- c/o abdominal discomfort and regurgitation of saliva and food.  appetite is good - eating entire contents on tray    no fever   remains on airborne contact precautions  for covid 12/13 no SOB  esophageal stent removed by GI      PAST MEDICAL & SURGICAL HISTORY:  ETOH abuse  sober x1 year approximately      Pancreatitis      Hepatitis C  treated      Gout      HTN (hypertension)      Chronic kidney disease (CKD)      H/O chronic pancreatitis      Gout      Alcohol abuse      Gastric perforation          Allergies    Tylenol (Unknown)    Intolerances        ANTIMICROBIALS:  piperacillin/tazobactam IVPB.. 3.375 every 12 hours      OTHER MEDS:  acetaminophen     Tablet .. 650 milliGRAM(s) Oral every 6 hours PRN  bisacodyl 5 milliGRAM(s) Oral at bedtime  calcitriol   Capsule 0.25 MICROGram(s) Oral daily  cyclobenzaprine 5 milliGRAM(s) Oral three times a day PRN  folic acid 1 milliGRAM(s) Oral daily  heparin   Injectable 5000 Unit(s) SubCutaneous every 8 hours  NIFEdipine XL 30 milliGRAM(s) Oral daily  ondansetron Injectable 4 milliGRAM(s) IV Push every 8 hours PRN  oxyCODONE    IR 15 milliGRAM(s) Oral every 8 hours PRN  pancrelipase  (CREON  6,000 Lipase Units) 1 Capsule(s) Oral three times a day with meals  pantoprazole  Injectable 40 milliGRAM(s) IV Push two times a day  senna 2 Tablet(s) Oral at bedtime  sucralfate suspension 1 Gram(s) Oral every 6 hours  thiamine 100 milliGRAM(s) Oral daily      SOCIAL HISTORY:    FAMILY HISTORY:  FH: HTN (hypertension)        REVIEW OF SYSTEMS  [  ] ROS unobtainable because:    [x  ] All other systems negative except as noted below:	    Constitutional:  [ ] fever [ ] chills  [ ] weight loss  [ ] weakness  Skin:  [ ] rash [ ] phlebitis	  Eyes: [ ] icterus [ ] pain  [ ] discharge	  ENMT: [ ] sore throat  [ ] thrush [ ] ulcers [ ] exudates  Respiratory: [ ] dyspnea [ ] hemoptysis [ ] cough [ ] sputum	  Cardiovascular:  [ ] chest pain [ ] palpitations [ ] edema	  Gastrointestinal:  [ ] nausea [ ] vomiting [ ] diarrhea [ ] constipation [ ] pain	  Genitourinary:  [ ] dysuria [ ] frequency [ ] hematuria [ ] discharge [ ] flank pain  [ ] incontinence  Musculoskeletal:  [ ] myalgias [ ] arthralgias [ ] arthritis  [ ] back pain  Neurological:  [ ] headache [ ] seizures  [ ] confusion/altered mental status  Psychiatric:  [ ] anxiety [ ] depression	  Hematology/Lymphatics:  [ ] lymphadenopathy  Endocrine:  [ ] adrenal [ ] thyroid  Allergic/Immunologic:	 [ ] transplant [ ] seasonal    PHYSICAL EXAM:  Constitutional: uncomfortable due to hiccough  non toxic   Eyes: No icterus.  Oral cavity: tongue coated  RS: decreased BS  CVS: S1, S2   Abdomen: Soft. scar  Vascular: left iv  Neuro: alert      Drug Dosing Weight  Height (cm): 182.9 (15 Dec 2022 10:08)  Weight (kg): 59.1 (16 Dec 2022 07:36)  BMI (kg/m2): 17.7 (16 Dec 2022 07:36)  BSA (m2): 1.78 (16 Dec 2022 07:36)    Vital Signs Last 24 Hrs  T(F): 97.2 (12-23-22 @ 12:08), Max: 98.6 (12-22-22 @ 06:30)    Vital Signs Last 24 Hrs  HR: 98 (12-23-22 @ 12:08) (90 - 98)  BP: 153/85 (12-23-22 @ 12:08) (145/85 - 153/85)  RR: 17 (12-23-22 @ 12:08)  SpO2: 98% (12-23-22 @ 12:08) (98% - 100%)  Wt(kg): --                          9.3    11.47 )-----------( 236      ( 23 Dec 2022 11:55 )             29.3       12-23    140  |  111<H>  |  54<H>  ----------------------------<  131<H>  4.4   |  17<L>  |  5.70<H>    Ca    7.0<L>      23 Dec 2022 11:55  Phos  5.7     12-23  Mg     1.80     12-23            MICROBIOLOGY:  .Blood Blood-Peripheral  12-05-22   No Growth Final  --  --      .Blood Blood-Peripheral  12-05-22   No Growth Final  --  --      ET Tube ET Tube  11-27-22   No acid-fast bacilli isolated at 3 weeks.  --  --      ET Tube ET Tube  11-26-22   Few Pseudomonas aeruginosa  Normal Respiratory Mariah present  --  Pseudomonas aeruginosa      ET Tube ET Tube  11-25-22   Normal Respiratory Mariah present  --    Few polymorphonuclear leukocytes per low power field  No Squamous epithelial cells per low power field  Rare Gram Negative Rods per oil power field  Rare Gram Positive Cocci in Pairs and Chains per oil power field      Clean Catch Clean Catch (Midstream)  11-25-22   >100,000 CFU/ml Klebsiella pneumoniae  --  Klebsiella pneumoniae      .Blood Blood-Peripheral  11-25-22   Growth in anaerobic bottle: Morganella morganii  See previous culture [id number]  --  Blood Culture PCR  Morganella morganii        RADIOLOGY:    r< from: CT Chest No Cont (12.07.22 @ 13:00) >    ACC: [id number] EXAM:  CT CHEST                          PROCEDURE DATE:  12/07/2022          INTERPRETATION:  CLINICAL INFORMATION: Cavitary lung lesion    COMPARISON: 8/30/2022, 11/25/2022.    CONTRAST/COMPLICATIONS:  IV Contrast: NONE  Oral Contrast: NONE  Complications: None reported at time of study completion    PROCEDURE:  CT of the Chest was performed.  Sagittal and coronal reformats were performed.    FINDINGS:    LUNGS AND AIRWAYS: Small amount of mucus within the distal trachea.    There is a 3.8 x 2.1 x 3.1 cm cavitary consolidation in the right upper   lobe (previously 4.5 x 3.3 x 4.7 cm), with decreased amount of air within   the lesion compared to prior. Mild paraseptal emphysema at the lung   apices.  PLEURA: Trace bilateral pleural effusions with associated compressive   atelectasis.  MEDIASTINUM AND LAKE: Proximal to mid esophagus is mildly distended with   air and fluid. There is a distal esophageal stent.  VESSELS: Within normal limits.  HEART: Heart size is normal. Nopericardial effusion.  CHEST WALL AND LOWER NECK: Within normal limits.  VISUALIZED UPPER ABDOMEN: Large amount of inspissated material within the   stomach. Large amount stool within the colon. Extensive pancreatic   calcifications compatible with chronic pancreatitis.  BONES: Within normal limits.    IMPRESSION:  Interval decrease in size of the cavitary consolidation in the right   upper lobe, which now measures 3.8 x 2.1 x 3.1 cm. Decreased amount of   air within the consolidation compared to11/25/2022.    Trace bilateral pleural effusions with associated atelectasis.    Additional findings as above.    --- End of Report ---            KURT SPAIN MD; Attending Radiologist  This document has been electronically signed. Dec  7 2022  2:18PM    < end of copied text >

## 2022-12-23 NOTE — PROGRESS NOTE ADULT - PROBLEM SELECTOR PLAN 9
DVT ppx: Heparin subq   Diet: Regular diet, monitor to see if tolerating   Dispo: Plan for [facility] pending medical stabilization

## 2022-12-23 NOTE — PROGRESS NOTE ADULT - SUBJECTIVE AND OBJECTIVE BOX
[facility] Division of Kidney Diseases & Hypertension  FOLLOW UP NOTE  --------------------------------------------------------------------------------  HPI: Pt is a 62-year-old Male with Hx of esophageal stricture (S/p stenting in 5/22), Emphysema, HCV, chronic pancreatitis who initially presented to [facility] on 11/25/22 for AMS. Pt was admitted to [facility] on 11/25 for AMS 2/2 Septic shock.. Pt was initially intubated for acute respiratory failure and required pressors for hypotension. Pt's hospital course was complicated by YUNIEL/ATN requiring HD. Pt was extubated and was transferred to [facility] for esophageal stent removal by Dr. Erickson. Initial labs on this admission concerning for elevated Scr. Nephrology team following for YUNIEL on CKD.     On review of [facility], it was noted that Scr was elevated at 2.20 in September 2022. Scr on admission was significantly elevated at 8.94 on 11/25/22. Pt received 1st HD session on 11/26/22. Last HD session was done on 12/1/22. HD catheter was removed as kidney function was recovering. Scr was elevated at 5.28 on transfer from [facility] on 12/15/22. Scr remains elevated/stable at 5 range today. CT scan abdo done on 12/5/22 showed L kidney cyst but no hydronephrosis. UA done on 11/25/22 showed proteinuria. Of note, Pt says he received HD ~ 2 years for similar symptoms.     Pt. seen and examined at bedside. He states he is feeling okay no chest pain no nausea feels very hungry but is having his esophageal pain again.    PAST HISTORY  --------------------------------------------------------------------------------  No significant changes to PMH, PSH, FHx, SHx, unless otherwise noted    ALLERGIES & MEDICATIONS  --------------------------------------------------------------------------------  Allergies    Tylenol (Unknown)    Intolerances      Standing Inpatient Medications  bisacodyl 5 milliGRAM(s) Oral at bedtime  calcitriol   Capsule 0.25 MICROGram(s) Oral daily  folic acid 1 milliGRAM(s) Oral daily  heparin   Injectable 5000 Unit(s) SubCutaneous every 8 hours  NIFEdipine XL 30 milliGRAM(s) Oral daily  pantoprazole  Injectable 40 milliGRAM(s) IV Push two times a day  piperacillin/tazobactam IVPB.. 3.375 Gram(s) IV Intermittent every 12 hours  senna 2 Tablet(s) Oral at bedtime  sucralfate suspension 1 Gram(s) Oral every 6 hours  thiamine 100 milliGRAM(s) Oral daily    PRN Inpatient Medications  acetaminophen     Tablet .. 650 milliGRAM(s) Oral every 6 hours PRN  cyclobenzaprine 5 milliGRAM(s) Oral three times a day PRN  ondansetron Injectable 4 milliGRAM(s) IV Push every 8 hours PRN  oxyCODONE    IR 15 milliGRAM(s) Oral every 8 hours PRN      REVIEW OF SYSTEMS  --------------------------------------------------------------------------------  Gen: no fever  Respiratory: no sob  CV: no cp  GI: has esophageal pain  : no difficulty with urination  MSK: no pain    VITALS/PHYSICAL EXAM  --------------------------------------------------------------------------------  T(C): 36.8 (12-23-22 @ 06:00), Max: 37 (12-22-22 @ 22:27)  HR: 98 (12-23-22 @ 06:00) (88 - 98)  BP: 145/85 (12-23-22 @ 06:00) (134/78 - 160/90)  ABP: --  ABP(mean): --  RR: 18 (12-23-22 @ 06:00) (18 - 18)  SpO2: 99% (12-23-22 @ 06:00) (99% - 100%)  CVP(mm Hg): --    Physical Exam:  	Gen: ill appearing, frail  	HEENT: MMM  	Pulm: coarse breath sounds  	CV: S1S2  	Abd: Soft, +BS   	Ext: No LE edema B/L  	Neuro: Awake and alert   	Skin: Warm and dry    LABS/STUDIES  --------------------------------------------------------------------------------              9.0    14.08 >-----------<  282      [12-22-22 @ 06:40]              28.3     143  |  110  |  57  ----------------------------<  49      [12-22-22 @ 06:40]  3.8   |  18  |  5.86        Ca     7.4     [12-22-22 @ 06:40]      Mg     1.80     [12-22-22 @ 06:40]      Phos  6.1     [12-22-22 @ 06:40]            Creatinine Trend:  SCr 5.86 [12-22 @ 06:40]  SCr 5.57 [12-21 @ 08:04]  SCr 5.38 [12-20 @ 11:15]  SCr 5.39 [12-19 @ 17:31]  SCr 5.79 [12-19 @ 03:00]    Urinalysis - [12-18-22 @ 00:40]      Color Light Yellow / Appearance Clear / SG 1.013 / pH 6.0      Gluc Negative / Ketone Negative  / Bili Negative / Urobili <2 mg/dL       Blood Trace / Protein 30 mg/dL / Leuk Est Negative / Nitrite Negative      RBC 2 / WBC 1 / Hyaline  / Gran  / Sq Epi  / Non Sq Epi 1 / Bacteria Negative    Urine Creatinine 46      [12-18-22 @ 00:40]  Urine Protein 34      [12-18-22 @ 00:40]    PTH -- (Ca --)      [12-22-22 @ 06:40]   290

## 2022-12-23 NOTE — PROGRESS NOTE ADULT - PROBLEM SELECTOR PLAN 8
BP slightly elevated, although intermittently refusing BP meds   renal dopplers show left sided HESHAM 60-99%  continue with Nifedipine 30mg QD

## 2022-12-23 NOTE — CONSULT NOTE ADULT - SUBJECTIVE AND OBJECTIVE BOX
GENERAL SURGERY CONSULT NOTE  --------------------------------------------------------------------------------------------    Patient is a 62y old  Male who presents with a chief complaint of intractable N/V , esophageal stent removal (23 Dec 2022 17:41)      HPI: Pt is a 62-year-old Male with Hx of esophageal stricture (S/p stenting in 5/22), Emphysema, HCV, chronic pancreatitis who initially presented to [facility] on 11/25/22 for AMS. Pt was admitted to [facility] on 11/25 for AMS 2/2 Septic shock due to Pseudomonal PNA, Lung abscess, Morganella bacteremia and klebsiella UTI. Pt was initially intubated for acute respiratory failure and required pressors for hypotension. Pt's hospital course was complicated by YUNIEL/ATN requiring HD. Pt was extubated and was transferred to [facility] for esophageal stent removal by Dr. Erickson. Pt was also found to be COVID + on 12/13/22. Initial labs on this admission concerning for elevated Scr.     After admission, nephrology was consulted, and suggested AVF planning for YUNIEL on advanced CKD. Of note,  patient's Scr was elevated at 2.20 in September 2022. Scr was elevated at 3.92 on 8/30/22. Scr elevated / improved to 3.38 on 9/5/22. pt was recently admitted at [facility]. Scr on admission was significantly elevated at 8.94 on 11/25/22. Pt was seen by in-house nephrologist Dr. Dukes. Pt received 1st HD session on 11/26/22. Last HD session was done on 12/1/22. HD catheter was removed as kidney function was recovering. Scr was elevated at 5.28 on transfer from [facility] on 12/15/22. Scr remained elevated at 5.56 today. CT scan abdo done on 12/5/22 showed L kidney cyst but no hydronephrosis. UA done on 11/25/22 showed proteinuria. Pt says he received HD ~ 2 years for similar symptoms. Pt denies seeing a nephrology after discharge (2 years ago). Denies recent intake of NSAIDs/ motrin. Denies Hx of kidney stones.      Vascular surgery is consulted for AVF planning.       ROS: 10-system review is otherwise negative except HPI above.      PAST MEDICAL & SURGICAL HISTORY:  ETOH abuse  sober x1 year approximately      Pancreatitis      Hepatitis C  treated      Gout      HTN (hypertension)      Chronic kidney disease (CKD)      H/O chronic pancreatitis      Gout      Alcohol abuse      Gastric perforation        FAMILY HISTORY:  FH: HTN (hypertension)      [] Family history not pertinent as reviewed with the patient and family    SOCIAL HISTORY:  Pt states he lives with his mother. Smokes 1/2 ppd for many years. Denying ETOH abuse.    ALLERGIES: Tylenol (Unknown)      HOME MEDICATIONS:   · 	pantoprazole 40 mg oral delayed release tablet: 1 tab(s) orally once a day (before a meal)  · 	NIFEdipine 30 mg oral tablet, extended release: 1 tab(s) orally once a day  · 	bisacodyl 5 mg oral delayed release tablet: 1 tab(s) orally once a day (at bedtime)  · 	cyclobenzaprine 5 mg oral tablet: 1 tab(s) orally 3 times a day, As needed, hiccuping MDD:15mg  · 	folic acid 1 mg oral tablet: 1 tab(s) orally once a day  · 	thiamine 100 mg oral tablet: 1 tab(s) orally once a day  · 	MiraLax oral powder for reconstitution: 17 gram(s) orally 2 times a day  · 	oxyCODONE 5 mg oral tablet: 3 tab(s) orally every 6 hours    CURRENT MEDICATIONS  MEDICATIONS (STANDING): bisacodyl 5 milliGRAM(s) Oral at bedtime  calcitriol   Capsule 0.25 MICROGram(s) Oral daily  folic acid 1 milliGRAM(s) Oral daily  heparin   Injectable 5000 Unit(s) SubCutaneous every 8 hours  NIFEdipine XL 30 milliGRAM(s) Oral daily  pancrelipase  (CREON  6,000 Lipase Units) 1 Capsule(s) Oral three times a day with meals  pantoprazole  Injectable 40 milliGRAM(s) IV Push two times a day  piperacillin/tazobactam IVPB.. 3.375 Gram(s) IV Intermittent every 12 hours  senna 2 Tablet(s) Oral at bedtime  sucralfate suspension 1 Gram(s) Oral every 6 hours  thiamine 100 milliGRAM(s) Oral daily    MEDICATIONS (PRN):acetaminophen     Tablet .. 650 milliGRAM(s) Oral every 6 hours PRN Mild Pain (1 - 3), Moderate Pain (4 - 6)  cyclobenzaprine 5 milliGRAM(s) Oral three times a day PRN Muscle Spasm  ondansetron Injectable 4 milliGRAM(s) IV Push every 8 hours PRN Nausea and/or Vomiting  oxyCODONE    IR 15 milliGRAM(s) Oral every 8 hours PRN mod-severe pain    --------------------------------------------------------------------------------------------    Vitals:   T(C): 36.3 (12-23-22 @ 17:59), Max: 37 (12-22-22 @ 22:27)  HR: 95 (12-23-22 @ 17:59) (90 - 98)  BP: 150/80 (12-23-22 @ 17:59) (145/85 - 153/85)  RR: 17 (12-23-22 @ 17:59) (17 - 18)  SpO2: 100% (12-23-22 @ 17:59) (98% - 100%)  CAPILLARY BLOOD GLUCOSE      POCT Blood Glucose.: 122 mg/dL (23 Dec 2022 07:44)    CAPILLARY BLOOD GLUCOSE      POCT Blood Glucose.: 122 mg/dL (23 Dec 2022 07:44)          PHYSICAL EXAM:   General: NAD, Lying in bed in no acute distress  Neuro: A+Ox3  HEENT: NC/AT, EOMI  Neck: Soft, supple  Cardio: RRR  Resp: Good effort, CTA b/l  Thorax: No chest wall tenderness  Breast: No lesions/masses, no drainage  GI/Abd: Soft, NT/ND, no rebound/guarding, no masses palpated  Vascular: All 4 extremities warm. b/l UE with palpable strong radial and ulnar pulse.   Skin: Intact, no breakdown  Lymphatic/Nodes: No palpable lymphadenopathy  Musculoskeletal: All 4 extremities moving spontaneously, no limitations  --------------------------------------------------------------------------------------------    LABS  CBC (12-23 @ 11:55)                              9.3<L>                         11.47<H>  )----------------(  236        --    % Neutrophils, --    % Lymphocytes, ANC: --                                  29.3<L>    BMP (12-23 @ 11:55)             140     |  111<H>  |  54<H> 		Ca++ --      Ca 7.0<L>             ---------------------------------( 131<H>		Mg 1.80               4.4     |  17<L>   |  5.70<H>			Ph 5.7<H>              --------------------------------------------------------------------------------------------    MICROBIOLOGY      --------------------------------------------------------------------------------------------    IMAGING

## 2022-12-23 NOTE — PROGRESS NOTE ADULT - PROBLEM SELECTOR PLAN 1
Hx of esophageal stricture (s/p stent on 5/2022 by Dr. Hernandez?)   intractable N/V, transferred to [facility] for esophageal stent removal by Dr. Erickson - performed showing severe esophagitis  - GI Consulted now s/p stent removal   - c/w PPI and Carafate  - will resume home oxycodone 15 mg TID. Plan to titrate off IV medication today.   - now on regular diet and tolerating without issues    # Epigastric pain   - likely secondary to above  - trops elevated on admission 210->199   - EKG with T wave flattening in inferolateral leads, consistent with prior study.   - will check TTE, NST

## 2022-12-23 NOTE — CHART NOTE - NSCHARTNOTEFT_GEN_A_CORE
Pt seen for Severe malnutrition follow up     Medical Course: 62 year old male with Hx of esophageal stricture (s/p stent on 5/2022 by Dr. Hernandez?), Emphysema, HCV, chronic pancreatitis, presenting from [facility] for esophageal stent removal due to intractable n/v. Admitted to [facility] from 11/25-12/14 for ams 2/2 Septic shock due to psuedomonal PNA, Lung abscess, morganella bacteremia and kelbsiella UTI c/b ATN requiring HD and intractable n/v.    Nutrition Course: Pt A&Ox4, awake in bed during time of visit. Pt asking for more food and snacks to be added to his tray. Per RN flowsheets % intake. Pt reports good appetite. No reported nausea, vomiting, diarrhea of constipation. Last BM 12/23. Per last RD recommending Ensure Plus High Protein 1 PO Three Times Daily (1050 kcal, 60 gm protein), pt not receiving. Food preferences obtained and updated.    Diet Prescription: Diet, Regular:   DASH/TLC {Sodium & Cholesterol Restricted} (DASH)  Low Fat (LOWFAT) (12-18-22 @ 18:23)    Pertinent Medications: MEDICATIONS  (STANDING):  bisacodyl 5 milliGRAM(s) Oral at bedtime  calcitriol   Capsule 0.25 MICROGram(s) Oral daily  folic acid 1 milliGRAM(s) Oral daily  heparin   Injectable 5000 Unit(s) SubCutaneous every 8 hours  NIFEdipine XL 30 milliGRAM(s) Oral daily  pancrelipase  (CREON  6,000 Lipase Units) 1 Capsule(s) Oral three times a day with meals  pantoprazole  Injectable 40 milliGRAM(s) IV Push two times a day  piperacillin/tazobactam IVPB.. 3.375 Gram(s) IV Intermittent every 12 hours  senna 2 Tablet(s) Oral at bedtime  sucralfate suspension 1 Gram(s) Oral every 6 hours  thiamine 100 milliGRAM(s) Oral daily    MEDICATIONS  (PRN):  acetaminophen     Tablet .. 650 milliGRAM(s) Oral every 6 hours PRN Mild Pain (1 - 3), Moderate Pain (4 - 6)  cyclobenzaprine 5 milliGRAM(s) Oral three times a day PRN Muscle Spasm  ondansetron Injectable 4 milliGRAM(s) IV Push every 8 hours PRN Nausea and/or Vomiting  oxyCODONE    IR 15 milliGRAM(s) Oral every 8 hours PRN mod-severe pain    Pertinent Labs: 12-23 Na140 mmol/L Glu 131 mg/dL<H> K+ 4.4 mmol/L Cr  5.70 mg/dL<H> BUN 54 mg/dL<H> 12-23 Phos 5.7 mg/dL<H> 12-21 Alb 2.0 g/dL<L>     CAPILLARY BLOOD GLUCOSE  POCT Blood Glucose.: 122 mg/dL (23 Dec 2022 07:44)      Weight: Height (cm): 182.9 (12-15 @ 10:08), 177.8 (11-25 @ 00:42)  Weight (kg): 59.1 (12-16 @ 07:36), 42.3 (11-25 @ 06:21)  BMI (kg/m2): 17.7 (12-16 @ 07:36), 12.6 (12-15 @ 10:08), 13.4 (11-25 @ 06:21)  Weight Assessment: No new weight to assess       Physical Assessment, per flowsheets:  Edema: No edema noted per RN flowsheets   Skin: Stage 1 sacral wound per RN flowsheets      Estimated Needs:   [X] No change since previous assessment     Previous Nutrition Diagnosis: severe protein calorie malnutrition   Nutrition Diagnosis is [ x] ongoing  New Nutrition Diagnosis: [x ] not applicable     Interventions:   1) Recommend d/c DASH/TLC, low fat diet.  2) Recommend Ensure Plus High Protein 1 PO Three Times Daily (1050 kcal, 60 gm protein)   3) Obtain updated weight   4) Encourage PO intake and honor food preferences as able.     Monitor & Evaluate:  PO intake, tolerance to diet/supplement, nutrition related lab values, weight trends, BMs/GI distress, hydration status, skin integrity.    [place]| [id number] (Also available on TEAMS) Pt seen for Severe malnutrition follow up     Medical Course: 62 year old male with Hx of esophageal stricture (s/p stent on 5/2022 by Dr. Hernandez?), Emphysema, HCV, chronic pancreatitis, presenting from [facility] for esophageal stent removal due to intractable n/v. Admitted to [facility] from 11/25-12/14 for ams 2/2 Septic shock due to psuedomonal PNA, Lung abscess, morganella bacteremia and kelbsiella UTI c/b ATN requiring HD and intractable n/v.    Nutrition Course: Pt A&Ox4, awake in bed during time of visit. Pt asking for more food and snacks to be added to his tray. Per RN flowsheets % intake. Pt reports good appetite. No reported nausea, vomiting, diarrhea of constipation. Last BM 12/23. Noted pt on creon. Per last RD recommending Ensure Plus High Protein 1 PO Three Times Daily (1050 kcal, 60 gm protein), pt not receiving. Food preferences obtained and updated.    Diet Prescription: Diet, Regular:   DASH/TLC {Sodium & Cholesterol Restricted} (DASH)  Low Fat (LOWFAT) (12-18-22 @ 18:23)    Pertinent Medications: MEDICATIONS  (STANDING):  bisacodyl 5 milliGRAM(s) Oral at bedtime  calcitriol   Capsule 0.25 MICROGram(s) Oral daily  folic acid 1 milliGRAM(s) Oral daily  heparin   Injectable 5000 Unit(s) SubCutaneous every 8 hours  NIFEdipine XL 30 milliGRAM(s) Oral daily  pancrelipase  (CREON  6,000 Lipase Units) 1 Capsule(s) Oral three times a day with meals  pantoprazole  Injectable 40 milliGRAM(s) IV Push two times a day  piperacillin/tazobactam IVPB.. 3.375 Gram(s) IV Intermittent every 12 hours  senna 2 Tablet(s) Oral at bedtime  sucralfate suspension 1 Gram(s) Oral every 6 hours  thiamine 100 milliGRAM(s) Oral daily    MEDICATIONS  (PRN):  acetaminophen     Tablet .. 650 milliGRAM(s) Oral every 6 hours PRN Mild Pain (1 - 3), Moderate Pain (4 - 6)  cyclobenzaprine 5 milliGRAM(s) Oral three times a day PRN Muscle Spasm  ondansetron Injectable 4 milliGRAM(s) IV Push every 8 hours PRN Nausea and/or Vomiting  oxyCODONE    IR 15 milliGRAM(s) Oral every 8 hours PRN mod-severe pain    Pertinent Labs: 12-23 Na140 mmol/L Glu 131 mg/dL<H> K+ 4.4 mmol/L Cr  5.70 mg/dL<H> BUN 54 mg/dL<H> 12-23 Phos 5.7 mg/dL<H> 12-21 Alb 2.0 g/dL<L>     CAPILLARY BLOOD GLUCOSE  POCT Blood Glucose.: 122 mg/dL (23 Dec 2022 07:44)      Weight: Height (cm): 182.9 (12-15 @ 10:08), 177.8 (11-25 @ 00:42)  Weight (kg): 59.1 (12-16 @ 07:36), 42.3 (11-25 @ 06:21)  BMI (kg/m2): 17.7 (12-16 @ 07:36), 12.6 (12-15 @ 10:08), 13.4 (11-25 @ 06:21)  Weight Assessment: No new weight to assess       Physical Assessment, per flowsheets:  Edema: No edema noted per RN flowsheets   Skin: Stage 1 sacral wound per RN flowsheets      Estimated Needs:   [X] No change since previous assessment     Previous Nutrition Diagnosis: severe protein calorie malnutrition   Nutrition Diagnosis is [ x] ongoing  New Nutrition Diagnosis: [x ] not applicable     Interventions:   1) Recommend d/c DASH/TLC, low fat diet.  2) Recommend Ensure Plus High Protein 1 PO Three Times Daily (1050 kcal, 60 gm protein)   3) Obtain updated weight   4) Encourage PO intake and honor food preferences as able.   5) Continue thiamin and folic acid supplementation.    Monitor & Evaluate:  PO intake, tolerance to diet/supplement, nutrition related lab values, weight trends, BMs/GI distress, hydration status, skin integrity.    [place]hma [place], RD| [id number] (Also available on TEAMS)

## 2022-12-23 NOTE — PROGRESS NOTE ADULT - PROBLEM SELECTOR PLAN 3
chronic pancreatitis   previously on Creon, will resume given likely exocrine insufficiency with malabsorption given BMI of 17   c/w home oxycodone 15mg TID, confirmed in iSTOP  monitor

## 2022-12-23 NOTE — CONSULT NOTE ADULT - ASSESSMENT
62 M with YUNIEL on advanced CKD nearing ESRD, intermittent HD with HD catheter in the past 2 years.   Vascular surgery is consulted for AVF planning.       Plan:   - F/u bilateral upper extremity vein mapping, OR  pending scan  - Protect LUE(non-dominant):  No BPs, IVs, blood draws  - Please document medical/cardiology clearance for AVF  - no surgical intervention for L kidney artery stenosis given controlled BP  - Care per primary team     Plan discussed with fellow on call on behalf of Dr. Shorty Garg, [place] PGY-2  C team Surgery  [id number]

## 2022-12-23 NOTE — PROGRESS NOTE ADULT - PROBLEM SELECTOR PLAN 1
Pt with YUNIEL on CKD likely multifactorial in the setting of recent COVID infection, poor oral intake. Scr was elevated at 2.20 in September 2022. Pt was recently admitted at [facility]. Scr on admission was significantly elevated at 8.94 on 11/25/22. Pt received 1st HD session on 11/26/22. Last HD session was done on 12/1/22. HD catheter was removed as kidney function was recovering. Scr was elevated at 5.28 on transfer from [facility] on 12/15/22. Scr remains elevated/stable at 5. No urgent indication for HD.  Of note kidney sonogram of discrepant size renal artery duplex showing HESHAM but BP well controlled.  I spoke with Dr. Dukes (12/21) who knows the patient very well and has seen him in patient and outpatient over the last several years.  As per Dr. Hanks the patient's baseline creatinine is 3 and has advanced CKD and is nearing ESKD.  Discussed with Dr. Hanks that a vascular surgery evaluation for fistula would be appropriate when the patient is stable.  Therefore recommend vascular surgery evaluation as in-patient if possible.

## 2022-12-23 NOTE — CHART NOTE - NSCHARTNOTEFT_GEN_A_CORE
Vascular consult called for Abdominal Doppler findings (Findings consistent with a 60-99% stenosis in the proximal  left renal artery) and AVF placement - F/U further recs

## 2022-12-23 NOTE — PROGRESS NOTE ADULT - PROBLEM SELECTOR PLAN 5
S/p HD X 3  in ICU in [facility]   Cr plateaued at 5.7-6, off HD now  Avoid nephrotoxic, no contrast , no NSAID  VA renal duplex showing severe stenosis of the left renal artery   [ ] vascular consulted, f/u recs   Renally dose medications   nephro following    # Anemia of chronic disease  s/p 1u prbc in [facility] and s/p Retacrit IV Fe    #Hypocalcemia  - inonidez calcium 0.96, slightly low  - Corrected calcium is normal   - started on calitriol  - Continue to monitor

## 2022-12-23 NOTE — PROGRESS NOTE ADULT - SUBJECTIVE AND OBJECTIVE BOX
Patient is a 62y old  Male who presents with a chief complaint of intractable N/V , esophageal stent removal (23 Dec 2022 08:38)    Canelo Herrera MD   [facility] Division of Hospital Medicine   Pager [phone]  Reachable on Microsoft Teams     SUBJECTIVE / OVERNIGHT EVENTS:  Patient seen and examined this morning. Still reporting epigastric pain, states that it is worsened today.       MEDICATIONS  (STANDING):  bisacodyl 5 milliGRAM(s) Oral at bedtime  calcitriol   Capsule 0.25 MICROGram(s) Oral daily  folic acid 1 milliGRAM(s) Oral daily  heparin   Injectable 5000 Unit(s) SubCutaneous every 8 hours  NIFEdipine XL 30 milliGRAM(s) Oral daily  pancrelipase  (CREON  6,000 Lipase Units) 1 Capsule(s) Oral three times a day with meals  pantoprazole  Injectable 40 milliGRAM(s) IV Push two times a day  piperacillin/tazobactam IVPB.. 3.375 Gram(s) IV Intermittent every 12 hours  senna 2 Tablet(s) Oral at bedtime  sucralfate suspension 1 Gram(s) Oral every 6 hours  thiamine 100 milliGRAM(s) Oral daily    MEDICATIONS  (PRN):  acetaminophen     Tablet .. 650 milliGRAM(s) Oral every 6 hours PRN Mild Pain (1 - 3), Moderate Pain (4 - 6)  cyclobenzaprine 5 milliGRAM(s) Oral three times a day PRN Muscle Spasm  ondansetron Injectable 4 milliGRAM(s) IV Push every 8 hours PRN Nausea and/or Vomiting  oxyCODONE    IR 15 milliGRAM(s) Oral every 8 hours PRN mod-severe pain      Vital Signs Last 24 Hrs  T(C): 36.2 (23 Dec 2022 12:08), Max: 37 (22 Dec 2022 22:27)  T(F): 97.2 (23 Dec 2022 12:08), Max: 98.6 (22 Dec 2022 22:27)  HR: 98 (23 Dec 2022 12:08) (88 - 98)  BP: 153/85 (23 Dec 2022 12:08) (134/78 - 153/85)  BP(mean): --  RR: 17 (23 Dec 2022 12:08) (17 - 18)  SpO2: 98% (23 Dec 2022 12:08) (98% - 100%)  CAPILLARY BLOOD GLUCOSE      POCT Blood Glucose.: 122 mg/dL (23 Dec 2022 07:44)    I&O's Summary      General: frail appearing man sitting up in bed NAD, awake and alert  Eyes: conjunctiva clear, nonicteric sclera  Respiratory: No respiratory distress, CTABL, No rales, rhonchi, wheezing.  Cardiovascular: S1,S2; Regular rate and rhythm; No m/g/r. 2+ peripheral pulses  Gastrointestinal: midline surgical scar - healed, soft, mild tenderness throughout the abdomen, Nondistended; +BS.   Extremities: No c/c/e; warm to touch  Skin: No rashes, No erythema   Psych: appropriate mood and affect      LABS:                        9.3    11.47 )-----------( 236      ( 23 Dec 2022 11:55 )             29.3     12-23    140  |  111<H>  |  54<H>  ----------------------------<  131<H>  4.4   |  17<L>  |  5.70<H>    Ca    7.0<L>      23 Dec 2022 11:55  Phos  5.7     12-23  Mg     1.80     12-23                RADIOLOGY & ADDITIONAL TESTS:    Imaging Personally Reviewed:    Consultant(s) Notes Reviewed:      Care Discussed with Consultants/Other Providers:

## 2022-12-23 NOTE — PROGRESS NOTE ADULT - PROBLEM SELECTOR PLAN 4
COVID + on 12/13   Monitor O2 saturation, Maintain O2 saturation > 95%, O2 PRN   Pt has rhonchi on exam likely from lung abscess- Chest PT BID, Mucinex BID  monitor for symptoms  c/w HSQ

## 2022-12-23 NOTE — CONSULT NOTE ADULT - ASSESSMENT
61 yo man h/o esophageal stricture, s/p stent 5/2022, emphysema, chronic pancreatitis, HCV who [facility] with septic shock, respiratory failure, Morganella bacteremia and abnormal CT chest which showed a cavitary pneumonia RUL.  ETT sputum grew pseudomonas. He was treated with and continues on Zosyn.  CT repeated 12/5 showed interval decrease in size of cavitary consolidation, measuring 3.8 cm x 2.1 cm x 3.1 cm.  Transferred to [facility] for esophageal stent removal.   YUNIEL.  covid +  saturating well on RA.    Lung abscess in man with h/o esophageal stent placement 5/2022 now s/p stent removal.  Regurgitating saliva and food.   Morganella bacteremia 11/25 probably related to lung infection   Pseudomonas in sputum culture   Has completed 4 weeks of zosyn  F/U chest x ray ordered today.  covid +     Suggest;  follow chest x ray results  would c/w zosyn while remains in hospital   plan another 2 weeks of antibiotic  treatment - can switch zosyn to Ceftin po if d/c prior.  nystatin swish and swallow.    ID service available over weekend

## 2022-12-23 NOTE — CONSULT NOTE ADULT - ATTENDING COMMENTS
new onset ESRD now requiring HD  pt. needs HD access  will plan for AVF creation on this admission once medically optimized  protect L arm   vein mapping
As above    Discussed with GI fellow on 12/15/22 in the afternoon.    Impression:    #1.  Nausea and vomiting  #2.  Benign esophageal stricture s/p EGD/esophageal stent placement with suture fixation  #3.  Recent hospitalization for emphysema, pneumonia, lung abscess, requiring temporary mechanical ventilation now extubated, also with COVID-19 infection.  #4.  Also had Urinary tract infection with bacteremia and YUNIEL  #5.  Anemia     Recommendations:    #1.  Trial clear liquid diet, low threshold to discontinue if nausea/vomiting  #2.  Barium esophagram to obtain more information about stent patency  #3.  Follow CBC/stool output to look for GI bleed.  #4.  Will eventually need upper endoscopy / timing to be determined based on clinical course, esophagram, and pulmonary/COVID status.
Patient examined and ROS reviewed. A case of YUNIEL on CKD  in the setting of recent COVID infection and  poor oral intake with recent h/o partial recovery of YUNIEL with temporary last HD on 12/1/22.  Patient had also received HD for two years in the past. Currently, he is dehydrated and has acidosis. Advised hydration with bicarbonate infusion. Patient will be evaluated again if does not improve.

## 2022-12-23 NOTE — PROGRESS NOTE ADULT - PROBLEM SELECTOR PLAN 2
Septic shock due to pseudomonal PNA, Lung abscess, morganella bacteremia and kelbsiella UTI  On Zosyn since 11/25, as per ID in [facility] who recommended 4 week course of antibiotics versus possibly longer pending imaging at 4 weeks (12/23)  Will repeat CXR today  consult ID

## 2022-12-23 NOTE — PROGRESS NOTE ADULT - ASSESSMENT
62 year old male with Hx of esophageal stricture (s/p stent on 5/2022 by Dr. Hernandez?), Emphysema, HCV, chronic pancreatitis, presenting from [facility] for esophageal stent removal due to intractable n/v. Admitted to [facility] from 11/25-12/14 for ams 2/2 Septic shock due to psuedomonal PNA, Lung abscess, morganella bacteremia and kelbsiella UTI c/b ATN requiring HD and intractable n/v.

## 2022-12-24 NOTE — PROGRESS NOTE ADULT - PROBLEM SELECTOR PLAN 4
COVID + on 12/13   Monitor O2 saturation, Maintain O2 saturation > 95%, O2 PRN   monitor for symptoms  c/w HSQ

## 2022-12-24 NOTE — PROGRESS NOTE ADULT - ASSESSMENT
62 M with YUNIEL on advanced CKD nearing ESRD, intermittent HD with HD catheter in the past 2 years.   Vascular surgery is consulted for AVF planning.       Plan:     - AVF creation tentatively 12/30  - F/u bilateral upper extremity vein mapping  - Protect LUE(non-dominant):  No BPs, IVs, blood draws  - Please document medical/cardiology clearance for AVF  - no surgical intervention for L kidney artery stenosis given controlled BP  - Care per primary team     Vascular Surgery  #[id number]

## 2022-12-24 NOTE — PROGRESS NOTE ADULT - SUBJECTIVE AND OBJECTIVE BOX
Surgery Progress Note    Subjective:     Patient seen and examined at bedside. VSS, AF. No complaints this AM. Endorses he is yet to start dialysis but primary team is discussing    OBJECTIVE:     T(C): 36.8 (12-24-22 @ 09:50), Max: 36.8 (12-23-22 @ 21:32)  HR: 97 (12-24-22 @ 09:50) (95 - 98)  BP: 152/90 (12-24-22 @ 09:50) (150/80 - 167/95)  RR: 18 (12-24-22 @ 09:50) (17 - 18)  SpO2: 100% (12-24-22 @ 09:50) (96% - 100%)  Wt(kg): --    I&O's Detail    23 Dec 2022 07:01  -  24 Dec 2022 07:00  --------------------------------------------------------  IN:  Total IN: 0 mL    OUT:    Voided (mL): 700 mL  Total OUT: 700 mL    Total NET: -700 mL          PHYSICAL EXAM:    General: NAD, Lying in bed in no acute distress  Neuro: A+Ox3  HEENT: NC/AT, EOMI  Neck: Soft, supple  Cardio: RRR  Resp: Good effort, CTA b/l  Thorax: No chest wall tenderness  Breast: No lesions/masses, no drainage  GI/Abd: Soft, NT/ND, no rebound/guarding, no masses palpated  Vascular: All 4 extremities warm. b/l UE with palpable strong radial and ulnar pulse.   Skin: Intact, no breakdown  Lymphatic/Nodes: No palpable lymphadenopathy  Musculoskeletal: All 4 extremities moving spontaneously, no limitations        MEDICATIONS  (STANDING):  bisacodyl 5 milliGRAM(s) Oral at bedtime  calcitriol   Capsule 0.25 MICROGram(s) Oral daily  folic acid 1 milliGRAM(s) Oral daily  heparin   Injectable 5000 Unit(s) SubCutaneous every 8 hours  NIFEdipine XL 30 milliGRAM(s) Oral daily  nystatin    Suspension 783272 Unit(s) Oral three times a day  oxyCODONE    IR 5 milliGRAM(s) Oral once  pancrelipase  (CREON  6,000 Lipase Units) 1 Capsule(s) Oral three times a day with meals  pantoprazole  Injectable 40 milliGRAM(s) IV Push two times a day  piperacillin/tazobactam IVPB.. 3.375 Gram(s) IV Intermittent every 12 hours  senna 2 Tablet(s) Oral at bedtime  sucralfate suspension 1 Gram(s) Oral every 6 hours  thiamine 100 milliGRAM(s) Oral daily    MEDICATIONS  (PRN):  acetaminophen     Tablet .. 650 milliGRAM(s) Oral every 6 hours PRN Mild Pain (1 - 3), Moderate Pain (4 - 6)  cyclobenzaprine 5 milliGRAM(s) Oral three times a day PRN Muscle Spasm  ondansetron Injectable 4 milliGRAM(s) IV Push every 8 hours PRN Nausea and/or Vomiting  oxyCODONE    IR 15 milliGRAM(s) Oral every 8 hours PRN mod-severe pain      LABS:                          9.3    11.47 )-----------( 236      ( 23 Dec 2022 11:55 )             29.3     12-23    140  |  111<H>  |  54<H>  ----------------------------<  131<H>  4.4   |  17<L>  |  5.70<H>    Ca    7.0<L>      23 Dec 2022 11:55  Phos  5.7     12-23  Mg     1.80     12-23

## 2022-12-24 NOTE — PROGRESS NOTE ADULT - PROBLEM SELECTOR PLAN 1
Hx of esophageal stricture (s/p stent on 5/2022 by Dr. Hernandez?)   intractable N/V, transferred to [facility] for esophageal stent removal by Dr. Erickson - performed showing severe esophagitis  - GI Consulted now s/p stent removal   - c/w PPI and Carafate  - now on soft diet     # Epigastric pain   - likely secondary to above  - trops elevated on admission 210->199   - EKG with T wave flattening in inferolateral leads, consistent with prior study.   - TTE showing new mild global LV dysfunction   [ ] will check NST, cards eval

## 2022-12-24 NOTE — PROGRESS NOTE ADULT - PROBLEM SELECTOR PLAN 8
BP slightly elevated, although intermittently refusing BP meds   renal dopplers show left sided HESHAM 60-99%, vascular not recommending intervention   continue with Nifedipine 30mg QD

## 2022-12-24 NOTE — PROGRESS NOTE ADULT - SUBJECTIVE AND OBJECTIVE BOX
Patient is a 62y old  Male who presents with a chief complaint of intractable N/V , esophageal stent removal (23 Dec 2022 18:20)    Canelo Herrera MD   [facility] Division of Hospital Medicine   Pager [phone]  Reachable on [employer] Teams     SUBJECTIVE / OVERNIGHT EVENTS:  Patient seen and examined this morning. He continues to report some abdominal pain.     MEDICATIONS  (STANDING):  bisacodyl 5 milliGRAM(s) Oral at bedtime  calcitriol   Capsule 0.25 MICROGram(s) Oral daily  folic acid 1 milliGRAM(s) Oral daily  heparin   Injectable 5000 Unit(s) SubCutaneous every 8 hours  NIFEdipine XL 30 milliGRAM(s) Oral daily  nystatin    Suspension 247797 Unit(s) Oral three times a day  oxyCODONE    IR 5 milliGRAM(s) Oral once  pancrelipase  (CREON  6,000 Lipase Units) 1 Capsule(s) Oral three times a day with meals  pantoprazole  Injectable 40 milliGRAM(s) IV Push two times a day  piperacillin/tazobactam IVPB.. 3.375 Gram(s) IV Intermittent every 12 hours  senna 2 Tablet(s) Oral at bedtime  sucralfate suspension 1 Gram(s) Oral every 6 hours  thiamine 100 milliGRAM(s) Oral daily    MEDICATIONS  (PRN):  acetaminophen     Tablet .. 650 milliGRAM(s) Oral every 6 hours PRN Mild Pain (1 - 3), Moderate Pain (4 - 6)  cyclobenzaprine 5 milliGRAM(s) Oral three times a day PRN Muscle Spasm  ondansetron Injectable 4 milliGRAM(s) IV Push every 8 hours PRN Nausea and/or Vomiting  oxyCODONE    IR 15 milliGRAM(s) Oral every 8 hours PRN mod-severe pain      Vital Signs Last 24 Hrs  T(C): 36.8 (24 Dec 2022 09:50), Max: 36.8 (23 Dec 2022 21:32)  T(F): 98.2 (24 Dec 2022 09:50), Max: 98.2 (23 Dec 2022 21:32)  HR: 97 (24 Dec 2022 09:50) (95 - 98)  BP: 152/90 (24 Dec 2022 09:50) (150/80 - 167/95)  BP(mean): --  RR: 18 (24 Dec 2022 09:50) (17 - 18)  SpO2: 100% (24 Dec 2022 09:50) (96% - 100%)  CAPILLARY BLOOD GLUCOSE        I&O's Summary    23 Dec 2022 07:01  -  24 Dec 2022 07:00  --------------------------------------------------------  IN: 0 mL / OUT: 700 mL / NET: -700 mL      General: frail appearing man sitting up in bed NAD, awake and alert  Eyes: conjunctiva clear, nonicteric sclera  Respiratory: No respiratory distress, CTABL, No rales, rhonchi, wheezing.  Cardiovascular: S1,S2; Regular rate and rhythm; No m/g/r. 2+ peripheral pulses  Gastrointestinal: midline surgical scar - healed, soft, mild tenderness throughout the abdomen, Nondistended; +BS.   Extremities: No c/c/e; warm to touch  Skin: No rashes, No erythema   Psych: appropriate mood and affect      LABS:                        9.3    11.47 )-----------( 236      ( 23 Dec 2022 11:55 )             29.3     12-23    140  |  111<H>  |  54<H>  ----------------------------<  131<H>  4.4   |  17<L>  |  5.70<H>    Ca    7.0<L>      23 Dec 2022 11:55  Phos  5.7     12-23  Mg     1.80     12-23                RADIOLOGY & ADDITIONAL TESTS:    Imaging Personally Reviewed:    Consultant(s) Notes Reviewed:      Care Discussed with Consultants/Other Providers:

## 2022-12-24 NOTE — PROGRESS NOTE ADULT - PROBLEM SELECTOR PLAN 5
S/p HD X 3  in ICU in [facility]   Cr plateaued at 5.7-6, off HD now  Avoid nephrotoxic, no contrast , no NSAID  VA renal duplex showing severe stenosis of the left renal artery   vascular consulted, no plan for intervention for HESHAM given normal blood pressures  [ ] vascular planning on AVF placement this admission  Renally dose medications   nephro following    # Anemia of chronic disease  s/p 1u prbc in [facility] and s/p Retacrit IV Fe    #Hypocalcemia  - inonidez calcium 0.96, slightly low  - Corrected calcium is normal   - started on calitriol, intermittently refusing   - Continue to monitor

## 2022-12-24 NOTE — PROGRESS NOTE ADULT - ASSESSMENT
62 year old male with Hx of esophageal stricture (s/p stent on 5/2022 by Dr. Hernandez?), Emphysema, HCV, chronic pancreatitis, presenting from [facility] for esophageal stent removal due to intractable n/v. Admitted to [facility] from 11/25-12/14 for ams 2/2 Septic shock due to psuedomonal PNA, Lung abscess, morganella bacteremia and kelbsiella UTI c/b ATN requiring HD and intractable n/v.

## 2022-12-24 NOTE — PROGRESS NOTE ADULT - PROBLEM SELECTOR PLAN 2
Septic shock due to pseudomonal PNA, Lung abscess, morganella bacteremia and kelbsiella UTI  On Zosyn since 11/25, as per ID in [facility] who recommended 4 week course of antibiotics versus possibly longer pending imaging at 4 weeks (12/23)  CXR 12/23 - Stable RUL lesion, with new RLL pneumonia   ID recommending to continue antibiotics for at least 2 more weeks (through 1/6)   c/w zosyn while inpatient, per ID can transition to PO Ceftin when ready for discharge   [ ] SLP eval for possible aspiration

## 2022-12-25 NOTE — SWALLOW BEDSIDE ASSESSMENT ADULT - SWALLOW EVAL: DIAGNOSIS
1- Functional oral stage for puree, minced and moist solids, soft and bite sized solids, regular solids, mildly thick liquids, and thin liquids marked by adequate oral containment, timely mastication with adequate manipulation and adequate anterior to posterior transport with mild oral residue noted and cleared with liquid wash. 2- Mild to Moderate pharyngeal stage for puree, minced and moist solids, soft and bite sized solids, regular solids, mildly thick liquids and thin liquids marked by suspected delayed pharyngeal swallow with hyolaryngeal excursion upon palpation. Patient noted with productive cough throughout all PO trials suggestive of airway penetration.

## 2022-12-25 NOTE — PROGRESS NOTE ADULT - ASSESSMENT
62 year old male with Hx of esophageal stricture (s/p stent on 5/2022 by Dr. Hernandez?), Emphysema, HCV, chronic pancreatitis, presenting from [facility] for esophageal stent removal due to intractable n/v. Admitted to [facility] from 11/25-12/14 for ams 2/2 Septic shock due to psuedomonal PNA, Lung abscess, morganella bacteremia and kelbsiella UTI c/b ATN requiring HD and intractable n/v.

## 2022-12-25 NOTE — PROGRESS NOTE ADULT - PROBLEM SELECTOR PLAN 5
S/p HD X 3  in ICU in [facility]   Cr plateaued at 5.7-6, off HD now  Avoid nephrotoxic, no contrast , no NSAID  VA renal duplex showing severe stenosis of the left renal artery   vascular consulted, no plan for intervention for HESHAM given normal blood pressures  [ ] BUE vein mapping, vascular planning on AVF placement this admission  Renally dose medications   nephro following    # Anemia of chronic disease  [facility] and s/p Retacrit IV Fe    #Hypocalcemia  - inonidez calcium 0.96, slightly low  - Corrected calcium is normal   - started on calitriol, intermittently refusing   - Continue to monitor

## 2022-12-25 NOTE — SWALLOW BEDSIDE ASSESSMENT ADULT - ASR SWALLOW RECOMMEND DIAG
Objective testing warranted at this time given patient with esophageal stricture and recent chest imaging 12/23 indicating developing pneumonia and noted with productive cough during PO trials./VFSS/MBS

## 2022-12-25 NOTE — SWALLOW BEDSIDE ASSESSMENT ADULT - PHARYNGEAL PHASE
Suspected delayed pharyngeal swallow/Delayed cough post oral intake Suspected delayed pharyngeal swallow; Intermittent productive cough

## 2022-12-25 NOTE — SWALLOW BEDSIDE ASSESSMENT ADULT - COMMENTS
Progress Note- Hospitalist 12/25: "62 year old male with Hx of esophageal stricture (s/p stent on 5/2022 by Dr. Hernandez?), Emphysema, HCV, chronic pancreatitis, presenting from [facility] for esophageal stent removal due to intractable n/v. Admitted to [facility] from 11/25-12/14 for ams 2/2 Septic shock due to psuedomonal PNA, Lung abscess, morganella bacteremia and kelbsiella UTI c/b ATN requiring HD and intractable n/v."    CR Chest 12/23: "IMPRESSION: Stable opacity in the right upper lobe. New probable developing pneumonia in the right lower lobe."    Patient seen at bedside awake/alert during clinical swallow evaluation this PM. Patient is able to follow simple commands and make basic needs/wants known. Patient denies difficulty swallowing. Patient is noted with productive cough at baseline.

## 2022-12-25 NOTE — PROGRESS NOTE ADULT - SUBJECTIVE AND OBJECTIVE BOX
Patient is a 62y old  Male who presents with a chief complaint of intractable N/V , esophageal stent removal (24 Dec 2022 17:18)    Canelo Herrera MD   [facility] Division of Hospital Medicine   Pager [phone]  Reachable on [employer] Teams     SUBJECTIVE / OVERNIGHT EVENTS:  Patient seen and examined this morning. No events overnight. He denies nausea or vomiting   Regurgitating food yesterday denies happening today   He sates that pain is unchanged     MEDICATIONS  (STANDING):  bisacodyl 5 milliGRAM(s) Oral at bedtime  calcitriol   Capsule 0.25 MICROGram(s) Oral daily  folic acid 1 milliGRAM(s) Oral daily  heparin   Injectable 5000 Unit(s) SubCutaneous every 8 hours  NIFEdipine XL 30 milliGRAM(s) Oral daily  nystatin    Suspension [id number] Unit(s) Oral three times a day  pancrelipase  (CREON  6,000 Lipase Units) 1 Capsule(s) Oral three times a day with meals  pantoprazole  Injectable 40 milliGRAM(s) IV Push two times a day  piperacillin/tazobactam IVPB.. 3.375 Gram(s) IV Intermittent every 12 hours  senna 2 Tablet(s) Oral at bedtime  sucralfate suspension 1 Gram(s) Oral every 6 hours  thiamine 100 milliGRAM(s) Oral daily    MEDICATIONS  (PRN):  acetaminophen     Tablet .. 650 milliGRAM(s) Oral every 6 hours PRN Mild Pain (1 - 3), Moderate Pain (4 - 6)  cyclobenzaprine 5 milliGRAM(s) Oral three times a day PRN Muscle Spasm  ondansetron Injectable 4 milliGRAM(s) IV Push every 8 hours PRN Nausea and/or Vomiting  oxyCODONE    IR 15 milliGRAM(s) Oral every 8 hours PRN mod-severe pain      Vital Signs Last 24 Hrs  T(C): 37.1 (25 Dec 2022 09:30), Max: 37.1 (24 Dec 2022 22:22)  T(F): 98.7 (25 Dec 2022 09:30), Max: 98.7 (24 Dec 2022 22:22)  HR: 100 (25 Dec 2022 09:30) (98 - 108)  BP: 158/92 (25 Dec 2022 09:30) (145/94 - 158/92)  BP(mean): --  RR: 18 (25 Dec 2022 09:30) (18 - 18)  SpO2: 100% (25 Dec 2022 09:30) (98% - 100%)  CAPILLARY BLOOD GLUCOSE        I&O's Summary      General: frail appearing man sitting up in bed NAD, awake and alert  Eyes: conjunctiva clear, nonicteric sclera  Respiratory: No respiratory distress, CTABL, No rales, rhonchi, wheezing.  Cardiovascular: S1,S2; Regular rate and rhythm; No m/g/r. 2+ peripheral pulses  Gastrointestinal: midline surgical scar - healed, soft, mild tenderness throughout the abdomen, Nondistended; +BS.   Extremities: No c/c/e; warm to touch  Skin: No rashes, No erythema   Psych: appropriate mood and affect      LABS:                        8.3    11.42 )-----------( 252      ( 25 Dec 2022 06:34 )             26.5     12-25    145  |  116<H>  |  53<H>  ----------------------------<  102<H>  4.6   |  17<L>  |  5.94<H>    Ca    7.5<L>      25 Dec 2022 06:34  Phos  5.4     12-25  Mg     1.70     12-25    TPro  4.9<L>  /  Alb  1.8<L>  /  TBili  <0.2  /  DBili  x   /  AST  13  /  ALT  18  /  AlkPhos  146<H>  12-25              RADIOLOGY & ADDITIONAL TESTS:    Imaging Personally Reviewed:    Consultant(s) Notes Reviewed:      Care Discussed with Consultants/Other Providers:

## 2022-12-25 NOTE — PROGRESS NOTE ADULT - PROBLEM SELECTOR PLAN 1
Hx of esophageal stricture (s/p stent on 5/2022 by Dr. Hernandez?)   intractable N/V, transferred to [facility] for esophageal stent removal by Dr. Erickson - performed showing severe esophagitis  - GI Consulted now s/p stent removal   - c/w PPI and Carafate  - now on soft diet     # Epigastric pain   - likely secondary to above  - trops elevated on admission 210->199   - EKG with T wave flattening in inferolateral leads, consistent with prior study.   - TTE showing new mild global LV dysfunction   [ ] will check NST, cards eval

## 2022-12-26 NOTE — PROGRESS NOTE ADULT - SUBJECTIVE AND OBJECTIVE BOX
Surgery Progress Note    Subjective:     Patient seen and examined at bedside. VSS, AF. Some regurgitation noted, Speech and Swallow today     OBJECTIVE:     T(C): 36.9 (12-26-22 @ 07:32), Max: 37.1 (12-25-22 @ 17:20)  HR: 96 (12-26-22 @ 07:32) (90 - 100)  BP: 163/88 (12-26-22 @ 07:32) (144/80 - 163/88)  RR: 18 (12-26-22 @ 07:32) (18 - 19)  SpO2: 100% (12-26-22 @ 07:32) (100% - 100%)  Wt(kg): --    I&O's Detail    25 Dec 2022 07:01  -  26 Dec 2022 07:00  --------------------------------------------------------  IN:  Total IN: 0 mL    OUT:    Voided (mL): 500 mL  Total OUT: 500 mL    Total NET: -500 mL          PHYSICAL EXAM:    Neuro: A+Ox3  HEENT: NC/AT, EOMI  Neck: Soft, supple  Cardio: RRR  Resp: Good effort, CTA b/l  Thorax: No chest wall tenderness  Breast: No lesions/masses, no drainage  GI/Abd: Soft, NT/ND, no rebound/guarding, no masses palpated  Vascular: All 4 extremities warm. b/l UE with palpable strong radial and ulnar pulse.   Skin: Intact, no breakdown  Lymphatic/Nodes: No palpable lymphadenopathy  Musculoskeletal: All 4 extremities moving spontaneously, no limitations    MEDICATIONS  (STANDING):  bisacodyl 5 milliGRAM(s) Oral at bedtime  calcitriol   Capsule 0.25 MICROGram(s) Oral daily  folic acid 1 milliGRAM(s) Oral daily  heparin   Injectable 5000 Unit(s) SubCutaneous every 8 hours  NIFEdipine XL 30 milliGRAM(s) Oral daily  nystatin    Suspension 068522 Unit(s) Oral three times a day  pancrelipase  (CREON  6,000 Lipase Units) 1 Capsule(s) Oral three times a day with meals  pantoprazole  Injectable 40 milliGRAM(s) IV Push two times a day  piperacillin/tazobactam IVPB.. 3.375 Gram(s) IV Intermittent every 12 hours  senna 2 Tablet(s) Oral at bedtime  sucralfate suspension 1 Gram(s) Oral every 6 hours  thiamine 100 milliGRAM(s) Oral daily    MEDICATIONS  (PRN):  acetaminophen     Tablet .. 650 milliGRAM(s) Oral every 6 hours PRN Mild Pain (1 - 3), Moderate Pain (4 - 6)  cyclobenzaprine 5 milliGRAM(s) Oral three times a day PRN Muscle Spasm  ondansetron Injectable 4 milliGRAM(s) IV Push every 8 hours PRN Nausea and/or Vomiting  oxyCODONE    IR 15 milliGRAM(s) Oral every 8 hours PRN mod-severe pain      LABS:                          8.3    11.42 )-----------( 252      ( 25 Dec 2022 06:34 )             26.5     12-25    145  |  116<H>  |  53<H>  ----------------------------<  102<H>  4.6   |  17<L>  |  5.94<H>    Ca    7.5<L>      25 Dec 2022 06:34  Phos  5.4     12-25  Mg     1.70     12-25    TPro  4.9<L>  /  Alb  1.8<L>  /  TBili  <0.2  /  DBili  x   /  AST  13  /  ALT  18  /  AlkPhos  146<H>  12-25

## 2022-12-26 NOTE — PROGRESS NOTE ADULT - ASSESSMENT
62 M with YUNEIL on advanced CKD nearing ESRD, intermittent HD with HD catheter in the past 2 years.   Vascular surgery is consulted for AVF planning.     Plan:     - AVF creation tentatively 12/30  - Recommend ID consult  - F/u bilateral upper extremity vein mapping  - Protect LUE(non-dominant):  No BPs, IVs, blood draws  - Please document medical/cardiology clearance for AVF  - no surgical intervention for L kidney artery stenosis given controlled BP  - Care per primary team     Vascular Surgery  #[id number]

## 2022-12-26 NOTE — PROGRESS NOTE ADULT - ASSESSMENT
62 year old male with Hx of esophageal stricture (s/p stent on 5/2022 by Dr. Hernandez?), Emphysema, HCV, chronic pancreatitis, presenting from [facility] for esophageal stent removal due to intractable n/v. Admitted to [facility] from 11/25-12/14 for ams 2/2 Septic shock due to psuedomonal PNA, Lung abscess, morganella bacteremia and kelbsiella UTI c/b ATN requiring HD and intractable n/v.

## 2022-12-26 NOTE — PROGRESS NOTE ADULT - PROBLEM SELECTOR PLAN 1
Hx of esophageal stricture (s/p stent on 5/2022 by Dr. Hernandez?)   intractable N/V, transferred to [facility] for esophageal stent removal by Dr. Erickson - performed showing severe esophagitis  - GI Consulted now s/p stent removal   - c/w PPI and Carafate - patient refusing   - c/w soft diet     # Epigastric pain   - likely secondary to above  - trops elevated on admission 210->199. Were significantly elevated at OSH   - EKG with T wave flattening in inferolateral leads, consistent with prior study.   - TTE showing new mild global LV dysfunction   [ ] will check NST, cards eval

## 2022-12-26 NOTE — PROGRESS NOTE ADULT - SUBJECTIVE AND OBJECTIVE BOX
Patient is a 62y old  Male who presents with a chief complaint of intractable N/V , esophageal stent removal (26 Dec 2022 10:03)    Canelo Herrera MD   [facility] Division of Hospital Medicine   Pager [phone]  Reachable on [employer] Teams     SUBJECTIVE / OVERNIGHT EVENTS:  Patient seen and examined this morning. Reporting some epigastric pain today, continues to refuse to take and PO meds other than oxycodone ,dsepite my assurance that the sucrulfate would offer him benefit.   Denies any fevers, chills. Asking for regular diet.     MEDICATIONS  (STANDING):  bisacodyl 5 milliGRAM(s) Oral at bedtime  calcitriol   Capsule 0.25 MICROGram(s) Oral daily  folic acid 1 milliGRAM(s) Oral daily  heparin   Injectable 5000 Unit(s) SubCutaneous every 8 hours  NIFEdipine XL 30 milliGRAM(s) Oral daily  nystatin    Suspension 048333 Unit(s) Oral three times a day  pancrelipase  (CREON  6,000 Lipase Units) 1 Capsule(s) Oral three times a day with meals  pantoprazole  Injectable 40 milliGRAM(s) IV Push two times a day  piperacillin/tazobactam IVPB.. 3.375 Gram(s) IV Intermittent every 12 hours  senna 2 Tablet(s) Oral at bedtime  sucralfate suspension 1 Gram(s) Oral every 6 hours  thiamine 100 milliGRAM(s) Oral daily    MEDICATIONS  (PRN):  acetaminophen     Tablet .. 650 milliGRAM(s) Oral every 6 hours PRN Mild Pain (1 - 3), Moderate Pain (4 - 6)  cyclobenzaprine 5 milliGRAM(s) Oral three times a day PRN Muscle Spasm  ondansetron Injectable 4 milliGRAM(s) IV Push every 8 hours PRN Nausea and/or Vomiting  oxyCODONE    IR 15 milliGRAM(s) Oral every 8 hours PRN mod-severe pain      Vital Signs Last 24 Hrs  T(C): 36.9 (26 Dec 2022 07:32), Max: 37.1 (25 Dec 2022 17:20)  T(F): 98.5 (26 Dec 2022 07:32), Max: 98.8 (25 Dec 2022 17:20)  HR: 96 (26 Dec 2022 07:32) (90 - 100)  BP: 163/88 (26 Dec 2022 07:32) (144/80 - 163/88)  BP(mean): --  RR: 18 (26 Dec 2022 07:32) (18 - 19)  SpO2: 100% (26 Dec 2022 07:32) (100% - 100%)  CAPILLARY BLOOD GLUCOSE        I&O's Summary    25 Dec 2022 07:01  -  26 Dec 2022 07:00  --------------------------------------------------------  IN: 0 mL / OUT: 500 mL / NET: -500 mL        General: frail/ cachectic appearing man sitting up in bed NAD, awake and alert  Eyes: conjunctiva clear, nonicteric sclera  Respiratory: No respiratory distress, CTABL, No rales, rhonchi, wheezing.  Cardiovascular: S1,S2; Regular rate and rhythm; No m/g/r. 2+ peripheral pulses  Gastrointestinal: midline surgical scar - healed, soft, mild tenderness throughout the abdomen, Nondistended; +BS.   Extremities: No c/c/e; warm to touch  Skin: No rashes, No erythema   Psych: appropriate mood and affect      LABS:                        8.3    11.42 )-----------( 252      ( 25 Dec 2022 06:34 )             26.5     12-25    145  |  116<H>  |  53<H>  ----------------------------<  102<H>  4.6   |  17<L>  |  5.94<H>    Ca    7.5<L>      25 Dec 2022 06:34  Phos  5.4     12-25  Mg     1.70     12-25    TPro  4.9<L>  /  Alb  1.8<L>  /  TBili  <0.2  /  DBili  x   /  AST  13  /  ALT  18  /  AlkPhos  146<H>  12-25              RADIOLOGY & ADDITIONAL TESTS:    Imaging Personally Reviewed:    Consultant(s) Notes Reviewed:      Care Discussed with Consultants/Other Providers:

## 2022-12-26 NOTE — PROGRESS NOTE ADULT - PROBLEM SELECTOR PLAN 2
Septic shock due to pseudomonal PNA, Lung abscess, morganella bacteremia and kelbsiella UTI  On Zosyn since 11/25, as per ID in [facility] who recommended 4 week course of antibiotics versus possibly longer pending imaging at 4 weeks (12/23)  CXR 12/23 - Stable RUL lesion, with new RLL pneumonia   ID recommending to continue antibiotics for at least 2 more weeks (through 1/6)   c/w zosyn while inpatient, per ID can transition to PO Ceftin when ready for discharge   SLP ->Soft and Bite Sized Solids with Thin Liquids

## 2022-12-27 NOTE — SWALLOW VFSS/MBS ASSESSMENT ADULT - ADDITIONAL RECOMMENDATIONS
This service to follow as schedule permits for diet tolerance.  Medical Team advised to Reconsult if Patient exhibits a change in medical status or exhibits intolerance for recommended oral diet.

## 2022-12-27 NOTE — SWALLOW VFSS/MBS ASSESSMENT ADULT - DEMONSTRATES NEED FOR REFERRAL TO ANOTHER SERVICE
as PO may be guarded to meet caloric needs; benefit oral supplements to maximize caloric needs (e.g. ensure).  GI Consultation/Follow Up given history of esophageal dysphagia component which can exacerbate the swallow mechanism./Registered Dietitian/GI

## 2022-12-27 NOTE — PROGRESS NOTE ADULT - ASSESSMENT
62 year old male with Hx of esophageal stricture (s/p stent on 5/2022 by Dr. Hernandez?), Emphysema, HCV, chronic pancreatitis, presenting from [facility] for esophageal stent removal due to intractable n/v. Admitted to [facility] from 11/25-12/14 for ams 2/2 Septic shock due to psuedomonal PNA, Lung abscess, morganella bacteremia and kelbsiella UTI c/b ATN requiring HD and intractable n/v.

## 2022-12-27 NOTE — PROVIDER CONTACT NOTE (OTHER) - ACTION/TREATMENT ORDERED:
ACP notified and aware. As per ACP, pt is aspiration precautions and will be on soft and bite sized. ACP aware pt tends to refuse meds. Nursing education provided. will continue to monitor.

## 2022-12-27 NOTE — PROGRESS NOTE ADULT - ASSESSMENT
61 yo man h/o esophageal stricture, s/p stent 5/2022, emphysema, chronic pancreatitis, HCV who was [facility] with septic shock, respiratory failure, Morganella bacteremia and abnormal CT chest which showed a cavitary pneumonia RUL.  ETT sputum grew pseudomonas. He was treated with and continues on Zosyn.  CT repeated 12/5 showed interval decrease in size of cavitary consolidation, measuring 3.8 cm x 2.1 cm x 3.1 cm.  Transferred to [facility] 12/15 for esophageal stent removal. s/p removal 12/16.  covid + 12/13 now off airborne precautions   CKD planning AVF on 12/30    Lung abscess in man with h/o esophageal stent placement 5/2022 now s/p stent removal 12/16.  Regurgitating saliva and food.   Morganella bacteremia 11/25 probably related to lung infection.  Cleared bacteremia 12/5    Pseudomonas in 11/25 sputum culture   Has completed > 4 weeks of zosyn and overall improving    F/U chest x ray 12/23 stable opacity RUL, probable developing pneumonia RLL      Suggest;  would c/w zosyn while remains in hospital for vascular w/u - planning AVF later this week  plan for antibiotics to continue --> 1/6 - can switch zosyn to Ceftin po if d/c prior.

## 2022-12-27 NOTE — PROGRESS NOTE ADULT - PROBLEM SELECTOR PLAN 8
Abdomen BP slightly elevated, although intermittently refusing BP meds   renal dopplers show left sided HESHAM 60-99%, vascular not recommending intervention   continue with Nifedipine 30mg QD abdomen

## 2022-12-27 NOTE — PROGRESS NOTE ADULT - SUBJECTIVE AND OBJECTIVE BOX
Follow Up:  lung abscess    Interval History/ROS:  asking for solid food  hungry   returned from [facility]     Allergies  Tylenol (Unknown)        ANTIMICROBIALS:  nystatin    Suspension [id number] three times a day  piperacillin/tazobactam IVPB.. 3.375 every 12 hours      OTHER MEDS:  acetaminophen     Tablet .. 650 milliGRAM(s) Oral every 6 hours PRN  bisacodyl 5 milliGRAM(s) Oral at bedtime  calcitriol   Capsule 0.25 MICROGram(s) Oral daily  cyclobenzaprine 5 milliGRAM(s) Oral three times a day PRN  folic acid 1 milliGRAM(s) Oral daily  heparin   Injectable 5000 Unit(s) SubCutaneous every 8 hours  NIFEdipine XL 30 milliGRAM(s) Oral daily  ondansetron Injectable 4 milliGRAM(s) IV Push every 8 hours PRN  oxyCODONE    IR 15 milliGRAM(s) Oral every 8 hours PRN  pancrelipase  (CREON  6,000 Lipase Units) 1 Capsule(s) Oral three times a day with meals  pantoprazole  Injectable 40 milliGRAM(s) IV Push two times a day  senna 2 Tablet(s) Oral at bedtime  sucralfate suspension 1 Gram(s) Oral every 6 hours  thiamine 100 milliGRAM(s) Oral daily      Vital Signs Last 24 Hrs  T(C): 36.9 (26 Dec 2022 22:00), Max: 36.9 (26 Dec 2022 22:00)  T(F): 98.5 (26 Dec 2022 22:00), Max: 98.5 (26 Dec 2022 22:00)  HR: 97 (26 Dec 2022 22:00) (86 - 97)  BP: 150/89 (26 Dec 2022 22:00) (125/71 - 150/89)  BP(mean): --  RR: 18 (26 Dec 2022 22:00) (17 - 18)  SpO2: 100% (26 Dec 2022 22:00) (100% - 100%)    Parameters below as of 26 Dec 2022 22:00  Patient On (Oxygen Delivery Method): room air        PHYSICAL EXAM:  Constitutional: Non toxic  Eyes: No icterus.  Neck: Supple  RS: decreased BS bilat  CVS: S1, S2   Abdomen: soft  Extremities: 1+ edema  Neuro: Alert, oriented to time/place/person  Cranial nerves 2-12 grossly normal. No focal abnormalities      MICROBIOLOGY:    cuCulture - Blood (11.25.22 @ 01:46)   - Morganella morganii: Detec     .Blood Blood-Peripheral  12-05-22   No Growth Final  --  --      .Blood Blood-Peripheral  12-05-22   No Growth Final  --  --      ET Tube ET Tube  11-27-22   No acid-fast bacilli isolated at 3 weeks.  --  --        RADIOLOGY:    r< from: Xray Chest 1 View- PORTABLE-Routine (Xray Chest 1 View- PORTABLE-Routine .) (12.23.22 @ 15:30) >    ACC: [id number] EXAM:  XR CHEST PORTABLE ROUTINE 1V                          PROCEDURE DATE:  12/23/2022          INTERPRETATION:  HISTORY: Lung abscess    TECHNIQUE: A single AP view of the chest was obtained.    COMPARISON: 12/2/2022 and CT scan dated 12/7/2022    FINDINGS: The cardiac silhouette is normal in size. There is a right   upper lobe opacity, currently measuring 3.7 x 2.7 cm, not significantly   changed in size. There is a new hazy airspace opacity in the right lower   lobe, likely representing new developing pneumonia. The left lung remains   clear. There are no pleural effusions.    IMPRESSION: Stable opacity in the right upper lobe. New probable   developing pneumonia in the right lower lobe.    --- End of Report ---            RACIEL RUEDA MD; Attending Radiologist  This document has been electronically signed. Dec 23 2022  5:59PM    < end of copied text >  r< from: CT Abdomen and Pelvis w/ Oral Cont (12.05.22 @ 18:31) >  IMPRESSION:  1.  Esophageal stent in place which is partially filled with   debris/ingested material.  2.  The stomach is collapsed. There is gastric wall thickening with   submucosal edema. Probable gastritis.  3.  Chronic calcific pancreatitis.  4.  Large amount of stool throughout the colon consistent with   obstipation/constipation.    < end of copied text >

## 2022-12-27 NOTE — PROGRESS NOTE ADULT - SUBJECTIVE AND OBJECTIVE BOX
Hospitalist Progress Note  Yessy Montelongo MD Pager # [phone]    OVERNIGHT EVENTS: YOSSI    SUBJECTIVE / INTERVAL HPI: Patient seen and examined at bedside. Pt reports that he continues to feel chest pain. He has excess oral secretions that he is spitting into a bin. He wants his pain medications increased.     VITAL SIGNS:  Vital Signs Last 24 Hrs  T(C): 36.9 (26 Dec 2022 22:00), Max: 36.9 (26 Dec 2022 22:00)  T(F): 98.5 (26 Dec 2022 22:00), Max: 98.5 (26 Dec 2022 22:00)  HR: 97 (26 Dec 2022 22:00) (86 - 97)  BP: 150/89 (26 Dec 2022 22:00) (125/71 - 150/89)  BP(mean): --  RR: 18 (26 Dec 2022 22:00) (17 - 18)  SpO2: 100% (26 Dec 2022 22:00) (100% - 100%)    Parameters below as of 26 Dec 2022 22:00  Patient On (Oxygen Delivery Method): room air        PHYSICAL EXAM:    General: Cachetic appearing male resting in bed   HEENT: NC/AT; PERRL, anicteric sclera; MMM  Neck: supple  Cardiovascular: +S1/S2; RRR - reproducible chest pain over the right and left chest wall.   Respiratory: CTA B/L; no W/R/R  Gastrointestinal: soft, NT/ND; +BSx4  Extremities: WWP; no edema, clubbing or cyanosis  Vascular: 2+ radial, DP/PT pulses B/L  Neurological: AAOx3; no focal deficits    MEDICATIONS:  MEDICATIONS  (STANDING):  bisacodyl 5 milliGRAM(s) Oral at bedtime  calcitriol   Capsule 0.25 MICROGram(s) Oral daily  folic acid 1 milliGRAM(s) Oral daily  heparin   Injectable 5000 Unit(s) SubCutaneous every 8 hours  NIFEdipine XL 30 milliGRAM(s) Oral daily  nystatin    Suspension 295303 Unit(s) Oral three times a day  pancrelipase  (CREON  6,000 Lipase Units) 1 Capsule(s) Oral three times a day with meals  pantoprazole  Injectable 40 milliGRAM(s) IV Push two times a day  piperacillin/tazobactam IVPB.. 3.375 Gram(s) IV Intermittent every 12 hours  senna 2 Tablet(s) Oral at bedtime  sucralfate suspension 1 Gram(s) Oral every 6 hours  thiamine 100 milliGRAM(s) Oral daily    MEDICATIONS  (PRN):  acetaminophen     Tablet .. 650 milliGRAM(s) Oral every 6 hours PRN Mild Pain (1 - 3), Moderate Pain (4 - 6)  cyclobenzaprine 5 milliGRAM(s) Oral three times a day PRN Muscle Spasm  ondansetron Injectable 4 milliGRAM(s) IV Push every 8 hours PRN Nausea and/or Vomiting  oxyCODONE    IR 15 milliGRAM(s) Oral every 8 hours PRN mod-severe pain      ALLERGIES:  Allergies    Tylenol (Unknown)    Intolerances        LABS:              CAPILLARY BLOOD GLUCOSE          RADIOLOGY & ADDITIONAL TESTS: Reviewed.    ASSESSMENT:    PLAN:

## 2022-12-27 NOTE — PROGRESS NOTE ADULT - PROBLEM SELECTOR PLAN 1
Hx of esophageal stricture (s/p stent on 5/2022 by Dr. Hernandez?)   intractable N/V, transferred to [facility] for esophageal stent removal by Dr. Erickson - performed showing severe esophagitis  - GI Consulted now s/p stent removal   - c/w PPI and Carafate - patient refusing   - c/w soft diet     # Epigastric pain   - likely secondary to above  - trops elevated on admission 210->199. Were significantly elevated at OSH   - EKG with T wave flattening in inferolateral leads, consistent with prior study.   - TTE showing new mild global LV dysfunction   [ ] will check NST, cards eval - pending NST today.

## 2022-12-27 NOTE — SWALLOW VFSS/MBS ASSESSMENT ADULT - RECOMMENDED CONSISTENCY
1.) From an Oral Pharyngeal standpoint, Regular with Thin Liquids pending GI Clearance given history of esophageal dysphagia component.   2.) Feeding/Swallowing Guidelines: Upright position, small bites, chew well, single cup sip of thin liquids; alternate food and liquid intake  3.) Aspiration Precautions  4.) Reflux Precautions  5.) Maintain Good Oral Hygiene Care

## 2022-12-27 NOTE — PROGRESS NOTE ADULT - PROBLEM SELECTOR PLAN 5
S/p HD X 3  in ICU in [facility]   Cr plateaued at 5.7-6, off HD now  Avoid nephrotoxic, no contrast , no NSAID  VA renal duplex showing severe stenosis of the left renal artery   vascular consulted, no plan for intervention for HESHAM given normal blood pressures  [ ] BUE vein mapping, vascular planning on AVF placement this admission  Renally dose medications   nephro following    # Anemia of chronic disease  [facility]/p [facility]u [facility] and s/p Retacrit IV Fe    #Hypocalcemia  - inonidez calcium 0.96, slightly low  - Corrected calcium is normal   - started on calitriol, intermittently refusing   - Continue to monitor

## 2022-12-27 NOTE — SWALLOW VFSS/MBS ASSESSMENT ADULT - COMMENTS
Medicine Note 12/27/2022 - 62 year old male with Hx of esophageal stricture (s/p stent on 5/2022 by Dr. Hernandez?), Emphysema, HCV, chronic pancreatitis, presenting from [facility] for esophageal stent removal due to intractable n/v. Admitted to [facility] from 11/25-12/14 for ams 2/2 Septic shock due to psuedomonal PNA, Lung abscess, morganella bacteremia and kelbsiella UTI c/b ATN requiring HD and intractable n/v.    Of Note: Patient was seen for a Clinical Swallow Evaluation on 12/25/2022 (See Consult).    Patient arrived to Radiology for Cinesophagram. Patient transferred to a specialized seating unit with lateral view projection.

## 2022-12-27 NOTE — SWALLOW VFSS/MBS ASSESSMENT ADULT - PHARYNGEAL PHASE COMMENTS
delayed initiation of the pharyngeal swallow, adequate laryngeal elevation, adequate tongue base retraction, adequate pharyngeal constriction

## 2022-12-27 NOTE — SWALLOW VFSS/MBS ASSESSMENT ADULT - DIAGNOSTIC IMPRESSIONS
Patient presents with Mild Oral Stage and Mild Pharyngeal Stage Dysphagia. The Oral Stage is characterized by adequate oral containment, slow chewing for solid with ability to break down solid, adequate bolus manipulation, adequate tongue motion with adequate anterior to posterior transfer of the bolus for puree, solids, liquid trials with adequate oral clearance.  The Pharyngeal Stage is characterized by delayed initiation of the pharyngeal swallow (Bolus head to the vallecular/pyriforms for thin liquids/puree), adequate laryngeal elevation, adequate tongue base retraction and adequate pharyngeal constriction. There is adequate pharyngeal clearance Patient presents with Mild Oral Stage and Mild Pharyngeal Stage Dysphagia. The Oral Stage is characterized by adequate oral containment, slow chewing for solid with ability to break down solid, adequate bolus manipulation, adequate tongue motion with adequate anterior to posterior transfer of the bolus for puree, solids, liquid trials; intermittent premature loss/spillage to the hypopharynx for Thin Liquids due to reduced tongue to palate seal; with adequate oral clearance.  The Pharyngeal Stage is characterized by delayed initiation of the pharyngeal swallow (Bolus head to the vallecular/pyriforms for thin liquids/puree), adequate laryngeal elevation, adequate tongue base retraction and adequate pharyngeal constriction. There is adequate pharyngeal clearance for puree and solid trials.  There was Trace Laryngeal Penetration during the swallow for Thin Liquids with retrieval and airway protection maintained.  There was No Aspiration observed before, during or after the swallow for puree, solids and thin liquids.

## 2022-12-28 NOTE — PROGRESS NOTE ADULT - SUBJECTIVE AND OBJECTIVE BOX
Surgery Progress Note    Subjective:     Patient seen and examined at bedside. VSS, AF. On room air. Patient with some difficulty laying flat    OBJECTIVE:     T(C): 36.6 (12-28-22 @ 23:20), Max: 36.8 (12-28-22 @ 09:25)  HR: 88 (12-28-22 @ 23:20) (86 - 95)  BP: 134/79 (12-28-22 @ 23:20) (134/79 - 155/87)  RR: 17 (12-28-22 @ 23:20) (17 - 18)  SpO2: 100% (12-28-22 @ 23:20) (100% - 100%)  Wt(kg): --    I&O's Detail      PHYSICAL EXAM:    Neuro: A+Ox3  HEENT: NC/AT, EOMI  Neck: Soft, supple  Cardio: RRR  Resp: Good effort, CTA b/l  Thorax: No chest wall tenderness  Breast: No lesions/masses, no drainage  GI/Abd: Soft, NT/ND, no rebound/guarding, no masses palpated  Vascular: All 4 extremities warm. b/l UE with palpable strong radial and ulnar pulse.   Skin: Intact, no breakdown  Lymphatic/Nodes: No palpable lymphadenopathy  Musculoskeletal: All 4 extremities moving spontaneously, no limitations    MEDICATIONS  (STANDING):  bisacodyl 5 milliGRAM(s) Oral at bedtime  calcitriol   Capsule 0.25 MICROGram(s) Oral daily  folic acid 1 milliGRAM(s) Oral daily  heparin   Injectable 5000 Unit(s) SubCutaneous every 8 hours  NIFEdipine XL 30 milliGRAM(s) Oral daily  nystatin    Suspension 831665 Unit(s) Oral three times a day  pancrelipase  (CREON  6,000 Lipase Units) 1 Capsule(s) Oral three times a day with meals  pantoprazole  Injectable 40 milliGRAM(s) IV Push two times a day  piperacillin/tazobactam IVPB.. 3.375 Gram(s) IV Intermittent every 12 hours  senna 2 Tablet(s) Oral at bedtime  sucralfate suspension 1 Gram(s) Oral every 6 hours  thiamine 100 milliGRAM(s) Oral daily    MEDICATIONS  (PRN):  acetaminophen     Tablet .. 650 milliGRAM(s) Oral every 6 hours PRN Mild Pain (1 - 3), Moderate Pain (4 - 6)  cyclobenzaprine 5 milliGRAM(s) Oral three times a day PRN Muscle Spasm  ondansetron Injectable 4 milliGRAM(s) IV Push every 8 hours PRN Nausea and/or Vomiting  oxyCODONE    IR 7.5 milliGRAM(s) Oral every 4 hours PRN mod-severe pain      LABS:

## 2022-12-28 NOTE — CHART NOTE - NSCHARTNOTEFT_GEN_A_CORE
ACP spoke with Vascular. Patient is now planned for AVF creation on Friday 12/30/22.    Prudencio Aiken PA-C,   Internal Medicine ACP   In house pager #[phone]

## 2022-12-28 NOTE — PROGRESS NOTE ADULT - ASSESSMENT
62 M with YUNIEL on advanced CKD nearing ESRD, intermittent HD with HD catheter in the past 2 years.   Vascular surgery is consulted for AVF planning.     Plan:     - AVF creation tentatively 12/30  - Recommend ID consult  - F/u bilateral upper extremity vein mapping  - Protect LUE(non-dominant):  No BPs, IVs, blood draws  - Please document medical/cardiology clearance for AVF  - no surgical intervention for L kidney artery stenosis given controlled BP  - Care per primary team     Vascular Surgery  #[id number]

## 2022-12-28 NOTE — PROGRESS NOTE ADULT - PROBLEM SELECTOR PLAN 1
Pt with YUNIEL on CKD likely multifactorial in the setting of recent COVID infection, poor oral intake. Scr was elevated at 2.20 in September 2022. Pt was recently admitted at [facility]. Scr on admission was significantly elevated at 8.94 on 11/25/22. Pt received 1st HD session on 11/26/22. Last HD session was done on 12/1/22. HD catheter was removed as kidney function was recovering. Scr was elevated at 5.28 on transfer from [facility] on 12/15/22. Scr remains elevated/stable at 5 but no recent labs. No urgent indication for HD clinically but recommend blood work today.  Of note kidney sonogram of discrepant size renal artery duplex showing HESHAM but BP well controlled.  I spoke with Dr. Dukes (12/21) who knows the patient very well and has seen him in patient and outpatient over the last several years.  As per Dr. Hanks the patient's baseline creatinine is 3 and has advanced CKD and is nearing ESKD.  Patient is pending cardiac evaluation for clearance for AVF.

## 2022-12-28 NOTE — CHART NOTE - NSCHARTNOTEFT_GEN_A_CORE
GI reconsulted to comment of diet.     Patient with hx of esophageal stricture requiring stent placement on 4/19/2022. Post-procedural course c/b hematemesis with stent malposition requiring EGD for stent repositioning and suture on 4/29/2022 with sloughing of esophageal mucosa noted s/p EGD 12/16: Pre-existing esophageal stent removed. LA Grade D esophagitis. Evaluated by SLP    Can advance diet as tolerated from GI perspective.     Chico Rios, PGY6  Gastroenterology/Hepatology Fellow  During weekdays: Available on Microsoft Teams or pager:[phone] ([facility] Short Range Pager); [phone] (Long Range Pager)  Overnight/Weekend: Please page the on-call GI fellow

## 2022-12-28 NOTE — PROGRESS NOTE ADULT - PROBLEM SELECTOR PLAN 5
S/p HD X 3  in ICU in [facility]   Cr plateaued at 5.7-6, off HD now  Avoid nephrotoxic, no contrast , no NSAID  VA renal duplex showing severe stenosis of the left renal artery   vascular consulted, no plan for intervention for HESHAM given normal blood pressures  Renally dose medications   nephro following  - Plan for AVF creation on this admission. Requires cardiology clearance prior to the OR. Cardiology recommending NST. Patient having difficulty lying flat for the procedure 2/2 back pain. Plan to give oxycodone prior to stress test to help alleviate the pain and help the patient to lay flat.     # Anemia of chronic disease  s/p 1u prbc in [facility] and s/p Retacrit IV Fe    #Hypocalcemia  - inonidez calcium 0.96, slightly low  - Corrected calcium is normal   - started on calitriol, intermittently refusing   - Continue to monitor

## 2022-12-28 NOTE — CONSULT NOTE ADULT - SUBJECTIVE AND OBJECTIVE BOX
DATE OF SERVICE: 12-28-22     CHIEF COMPLAINT:Patient is a 62y old  Male who presents with a chief complaint of intractable N/V , esophageal stent removal (28 Dec 2022 15:23)      HISTORY OF PRESENT ILLNESS: HPI:  62 year old male with Hx of esophageal stricture (s/p stent on 5/2022 by Dr. Hernandez?), Emphysema, HCV, chronic pancreatitis, presenting from [facility] for esophageal stent removal due to intractable n/v. Pt was admitted to [facility] on 11/25 for AMS 2/2 Septic shock due to psuedomonal PNA, Lung abscess, morganella bacteremia and kelbsiella UTI. Pt was initially intubated for acute respiratory failure and required pressors for hypotension . Pt's hospital course was complicated by YUNIEL due to septic ATN requiring 3 rounds of HD. Pt is on now extubated, on 4L of NC, off pressors. Pt developed intractable N/V during his hospital stay and was transferred to [facility] for esophageal stent removal by Dr. Erickson.  Pt was also found to be COVID + on 12/13/22.   Pt alert, awake, AOx3 when seen. Pt states he feels cold and is aggravated. Pt demanding his pain medications for esophageal and body aches.   Denies chest pain, shortness of breath.      (15 Dec 2022 09:21)      PAST MEDICAL & SURGICAL HISTORY:  ETOH abuse  sober x1 year approximately      Pancreatitis      Hepatitis C  treated      Gout      HTN (hypertension)      Chronic kidney disease (CKD)      H/O chronic pancreatitis      Gout      Alcohol abuse      Gastric perforation              MEDICATIONS:  heparin   Injectable 5000 Unit(s) SubCutaneous every 8 hours  NIFEdipine XL 30 milliGRAM(s) Oral daily    nystatin    Suspension 480258 Unit(s) Oral three times a day  piperacillin/tazobactam IVPB.. 3.375 Gram(s) IV Intermittent every 12 hours      acetaminophen     Tablet .. 650 milliGRAM(s) Oral every 6 hours PRN  cyclobenzaprine 5 milliGRAM(s) Oral three times a day PRN  ondansetron Injectable 4 milliGRAM(s) IV Push every 8 hours PRN  oxyCODONE    IR 7.5 milliGRAM(s) Oral every 4 hours PRN    bisacodyl 5 milliGRAM(s) Oral at bedtime  pancrelipase  (CREON  6,000 Lipase Units) 1 Capsule(s) Oral three times a day with meals  pantoprazole  Injectable 40 milliGRAM(s) IV Push two times a day  senna 2 Tablet(s) Oral at bedtime  sucralfate suspension 1 Gram(s) Oral every 6 hours      calcitriol   Capsule 0.25 MICROGram(s) Oral daily  folic acid 1 milliGRAM(s) Oral daily  thiamine 100 milliGRAM(s) Oral daily      FAMILY HISTORY:  FH: HTN (hypertension)        Non-contributory    SOCIAL HISTORY:    [ ] not a smoker    Allergies    Tylenol (Unknown)    Intolerances    	    REVIEW OF SYSTEMS:  CONSTITUTIONAL: No fever  EYES: No eye pain, visual disturbances, or discharge  ENMT:  No difficulty hearing, tinnitus  NECK: No pain or stiffness  RESPIRATORY: No cough, wheezing,  CARDIOVASCULAR: No chest pain, palpitations, passing out, dizziness, or leg swelling  GASTROINTESTINAL:  No nausea, vomiting, diarrhea or constipation. No melena.  GENITOURINARY: No dysuria, hematuria  NEUROLOGICAL: No stroke like symptoms  SKIN: No burning or lesions   ENDOCRINE: No heat or cold intolerance  MUSCULOSKELETAL: No joint pain or swelling  PSYCHIATRIC: No  anxiety, mood swings  HEME/LYMPH: No bleeding gums  ALLERGY AND IMMUNOLOGIC: No hives or eczema	    All other ROS negative    PHYSICAL EXAM:  T(C): 36.4 (12-28-22 @ 17:30), Max: 36.8 (12-28-22 @ 09:25)  HR: 95 (12-28-22 @ 17:30) (86 - 95)  BP: 153/85 (12-28-22 @ 17:30) (135/75 - 155/87)  RR: 17 (12-28-22 @ 17:30) (17 - 18)  SpO2: 100% (12-28-22 @ 17:30) (100% - 100%)  Wt(kg): --  I&O's Summary      Appearance: Normal	  HEENT:   Normal oral mucosa, EOMI	  Cardiovascular:  S1 S2, No JVD,    Respiratory: Lungs clear to auscultation	  Psychiatry: Alert  Gastrointestinal:  Soft, Non-tender, + BS	  Skin: No rashes   Neurologic: Non-focal  Extremities:  No edema  Vascular: Peripheral pulses palpable    	    	  	  CARDIAC MARKERS:  Labs personally reviewed by me             EKG: Personally reviewed by me -   Radiology: Personally reviewed by me -   < from: Xray Chest 1 View- PORTABLE-Routine (Xray Chest 1 View- PORTABLE-Routine .) (12.23.22 @ 15:30) >  IMPRESSION: Stable opacity in the right upper lobe. New probable   developing pneumonia in the right lower lobe.    < end of copied text >      Assessment and Plan:   · Assessment	  62 year old male with Hx of esophageal stricture (s/p stent on 5/2022 by Dr. Hernandez?), Emphysema, HCV, chronic pancreatitis, presenting from [facility] for esophageal stent removal due to intractable n/v. Admitted to [facility] from 11/25-12/14 for ams 2/2 Septic shock due to psuedomonal PNA, Lung abscess, morganella bacteremia and kelbsiella UTI c/b ATN requiring HD and intractable n/v.      Problem/Plan - 1:  ·  Problem: Preop Risk Stratification  ·  Plan: - trops elevated on admission 210->199.  - EKG with T wave flattening in inferolateral leads.   - TTE showing new mild global LV dysfunction   - Pharm NM stress test to r/o ischemia     Problem/Plan - 2:  ·  Problem: HTN (hypertension).   ·  Plan: BP slightly elevated, although intermittently refusing BP meds   renal dopplers show left sided HESHAM 60-99%, vascular not recommending intervention   continue with Nifedipine 30mg QD.    Problem/Plan - 3:  ·  Problem: Need for prophylactic measure.   ·  Plan: DVT ppx: Heparin subq   Diet: Regular diet, monitor to see if tolerating               Differential diagnosis and plan of care discussed with patient after the evaluation. Counseling on diet, nutritional counseling, weight management, exercise and medication compliance was done.   Advanced care planning/advanced directives discussed with patient/family. DNR status including forceful chest compressions to attempt to restart the heart, ventilator support/artificial breathing, electric shock, artificial nutrition, health care proxy, Molst form all discussed with pt. Pt wishes to consider. More than fifteen minutes spent on discussing advanced directives.            Talib Davison DO [facility]  Cardiovascular Medicine  [address], [address]  Office [phone]  Available via call/text on Microsoft Teams

## 2022-12-28 NOTE — PROGRESS NOTE ADULT - ASSESSMENT
62 year old male with Hx of esophageal stricture (s/p stent on 5/2022 by Dr. Hernandez?), Emphysema, HCV, chronic pancreatitis, presenting from [facility] for esophageal stent removal due to intractable n/v. Admitted to [facility] from 11/25-12/14 for ams 2/2 Septic shock due to psuedomonal PNA, Lung abscess, morganella bacteremia and kelbsiella UTI c/b ATN requiring HD and intractable n/v.

## 2022-12-28 NOTE — PROGRESS NOTE ADULT - SUBJECTIVE AND OBJECTIVE BOX
[facility] Division of Kidney Diseases & Hypertension  FOLLOW UP NOTE  --------------------------------------------------------------------------------  HPI: Pt is a 62-year-old Male with Hx of esophageal stricture (S/p stenting in 5/22), Emphysema, HCV, chronic pancreatitis who initially presented to [facility] on 11/25/22 for AMS. Pt was admitted to [facility] on 11/25 for AMS 2/2 Septic shock.. Pt was initially intubated for acute respiratory failure and required pressors for hypotension. Pt's hospital course was complicated by YUNIEL/ATN requiring HD. Pt was extubated and was transferred to [facility] for esophageal stent removal by Dr. Erickson. Initial labs on this admission concerning for elevated Scr. Nephrology team following for YUNIEL on CKD.     On review of [facility], it was noted that Scr was elevated at 2.20 in September 2022. Scr on admission was significantly elevated at 8.94 on 11/25/22. Pt received 1st HD session on 11/26/22. Last HD session was done on 12/1/22. HD catheter was removed as kidney function was recovering. Scr was elevated at 5.28 on transfer from [facility] on 12/15/22. Scr remains elevated/stable at 5 range this week but no recent blood woork.  Seen and evaluated this morning no chest pain no shortness of breath continues to have issues with pain.    PAST HISTORY  --------------------------------------------------------------------------------  No significant changes to PMH, PSH, FHx, SHx, unless otherwise noted    ALLERGIES & MEDICATIONS  --------------------------------------------------------------------------------  Allergies    Tylenol (Unknown)    Intolerances      Standing Inpatient Medications  bisacodyl 5 milliGRAM(s) Oral at bedtime  calcitriol   Capsule 0.25 MICROGram(s) Oral daily  folic acid 1 milliGRAM(s) Oral daily  heparin   Injectable 5000 Unit(s) SubCutaneous every 8 hours  NIFEdipine XL 30 milliGRAM(s) Oral daily  nystatin    Suspension 450136 Unit(s) Oral three times a day  pancrelipase  (CREON  6,000 Lipase Units) 1 Capsule(s) Oral three times a day with meals  pantoprazole  Injectable 40 milliGRAM(s) IV Push two times a day  piperacillin/tazobactam IVPB.. 3.375 Gram(s) IV Intermittent every 12 hours  senna 2 Tablet(s) Oral at bedtime  sucralfate suspension 1 Gram(s) Oral every 6 hours  thiamine 100 milliGRAM(s) Oral daily    PRN Inpatient Medications  acetaminophen     Tablet .. 650 milliGRAM(s) Oral every 6 hours PRN  cyclobenzaprine 5 milliGRAM(s) Oral three times a day PRN  ondansetron Injectable 4 milliGRAM(s) IV Push every 8 hours PRN  oxyCODONE    IR 15 milliGRAM(s) Oral every 8 hours PRN      REVIEW OF SYSTEMS  --------------------------------------------------------------------------------  Gen: no fever  Respiratory: no sob  CV: no cp  GI: see hpi  : no complaints  MSK: no pain    VITALS/PHYSICAL EXAM  --------------------------------------------------------------------------------  T(C): 36.6 (12-28-22 @ 06:31), Max: 36.7 (12-27-22 @ 12:20)  HR: 86 (12-28-22 @ 06:31) (86 - 97)  BP: 155/87 (12-28-22 @ 06:31) (152/84 - 155/87)  ABP: --  ABP(mean): --  RR: 18 (12-28-22 @ 06:31) (17 - 18)  SpO2: 100% (12-28-22 @ 06:31) (99% - 100%)  CVP(mm Hg): --        Physical Exam:  	Gen: ill appearing, frail  	HEENT: MMM  	Pulm: coarse breath sounds  	CV: S1S2  	Abd: Soft, +BS   	Ext: No LE edema B/L  	Neuro: Awake and alert   	Skin: Warm and dry    LABS/STUDIES  --------------------------------------------------------------------------------                Creatinine Trend:  SCr 5.94 [12-25 @ 06:34]  SCr 5.70 [12-23 @ 11:55]  SCr 5.86 [12-22 @ 06:40]  SCr 5.57 [12-21 @ 08:04]  SCr 5.38 [12-20 @ 11:15]        PTH -- (Ca --)      [12-22-22 @ 06:40]   290

## 2022-12-28 NOTE — PROGRESS NOTE ADULT - SUBJECTIVE AND OBJECTIVE BOX
Hospitalist Progress Note  Yessy Montelongo MD Pager # [phone]    OVERNIGHT EVENTS: YOSSI    SUBJECTIVE / INTERVAL HPI: Patient seen and examined at bedside. Pt. reports difficulty lying flat for the stress test due to back pain. He reports the chest pain has been ongoing for 10 years and he does not think it is cardiac pain. There are no changes to the chest pain. All other ROS negative.     VITAL SIGNS:  Vital Signs Last 24 Hrs  T(C): 36.8 (28 Dec 2022 09:25), Max: 36.8 (28 Dec 2022 09:25)  T(F): 98.2 (28 Dec 2022 09:25), Max: 98.2 (28 Dec 2022 09:25)  HR: 95 (28 Dec 2022 09:25) (86 - 95)  BP: 135/75 (28 Dec 2022 09:25) (135/75 - 155/87)  BP(mean): --  RR: 17 (28 Dec 2022 09:25) (17 - 18)  SpO2: 100% (28 Dec 2022 09:25) (100% - 100%)    Parameters below as of 28 Dec 2022 09:25  Patient On (Oxygen Delivery Method): room air        PHYSICAL EXAM:    General: Thin appearing male resting in bed   HEENT: NC/AT; PERRL, anicteric sclera; MMM  Neck: supple  Cardiovascular: +S1/S2; RRR  Respiratory: CTA B/L; no W/R/R  Gastrointestinal: soft, NT/ND; +BSx4  Extremities: WWP; no edema, clubbing or cyanosis  Vascular: 2+ radial, DP/PT pulses B/L  Neurological: AAOx3; no focal deficits    MEDICATIONS:  MEDICATIONS  (STANDING):  bisacodyl 5 milliGRAM(s) Oral at bedtime  calcitriol   Capsule 0.25 MICROGram(s) Oral daily  folic acid 1 milliGRAM(s) Oral daily  heparin   Injectable 5000 Unit(s) SubCutaneous every 8 hours  NIFEdipine XL 30 milliGRAM(s) Oral daily  nystatin    Suspension 312302 Unit(s) Oral three times a day  pancrelipase  (CREON  6,000 Lipase Units) 1 Capsule(s) Oral three times a day with meals  pantoprazole  Injectable 40 milliGRAM(s) IV Push two times a day  piperacillin/tazobactam IVPB.. 3.375 Gram(s) IV Intermittent every 12 hours  senna 2 Tablet(s) Oral at bedtime  sucralfate suspension 1 Gram(s) Oral every 6 hours  thiamine 100 milliGRAM(s) Oral daily    MEDICATIONS  (PRN):  acetaminophen     Tablet .. 650 milliGRAM(s) Oral every 6 hours PRN Mild Pain (1 - 3), Moderate Pain (4 - 6)  cyclobenzaprine 5 milliGRAM(s) Oral three times a day PRN Muscle Spasm  ondansetron Injectable 4 milliGRAM(s) IV Push every 8 hours PRN Nausea and/or Vomiting  oxyCODONE    IR 7.5 milliGRAM(s) Oral every 4 hours PRN mod-severe pain      ALLERGIES:  Allergies    Tylenol (Unknown)    Intolerances        LABS:              CAPILLARY BLOOD GLUCOSE          RADIOLOGY & ADDITIONAL TESTS: Reviewed.    ASSESSMENT:    PLAN:

## 2022-12-28 NOTE — CHART NOTE - NSCHARTNOTEFT_GEN_A_CORE
As per sign out, Dr. Talib Dvaison from cardiology was contacted by night ACP. Cardiology will see patient today 12/28.    Prudencio Aiken PA-C,   Internal Medicine ACP   In house pager #[phone]

## 2022-12-28 NOTE — PROGRESS NOTE ADULT - PROBLEM SELECTOR PLAN 1
Hx of esophageal stricture (s/p stent on 5/2022 by Dr. Hernandez?)   intractable N/V, transferred to [facility] for esophageal stent removal by Dr. Erickson - performed showing severe esophagitis  - GI Consulted now s/p stent removal   - c/w PPI and Carafate - patient refusing   - c/w soft diet     # Epigastric pain   - likely secondary to above  - trops elevated on admission 210->199. Were significantly elevated at OSH   - EKG with T wave flattening in inferolateral leads, consistent with prior study.   - TTE showing new mild global LV dysfunction   [ ] will check NST, cards eval - cardiology recommending NST prior to OR however patient is having difficulty lying flat. Plan to give pain medications prior to the procedure to help the patient lay flat.

## 2022-12-29 NOTE — PROGRESS NOTE ADULT - SUBJECTIVE AND OBJECTIVE BOX
Hospitalist Progress Note  Yessy Montelongo MD Pager # [phone]    OVERNIGHT EVENTS: YOSSI    SUBJECTIVE / INTERVAL HPI: Patient seen and examined at bedside. Pt. is asking for IV pain medication prior to his stress test because he has back pain. He continues to have the same chest pain/discomfort. All other ROS negative.     VITAL SIGNS:  Vital Signs Last 24 Hrs  T(C): 36.8 (29 Dec 2022 05:51), Max: 36.8 (29 Dec 2022 05:51)  T(F): 98.3 (29 Dec 2022 05:51), Max: 98.3 (29 Dec 2022 05:51)  HR: 85 (29 Dec 2022 05:51) (85 - 95)  BP: 144/76 (29 Dec 2022 05:51) (134/79 - 153/85)  BP(mean): --  RR: 18 (29 Dec 2022 05:51) (17 - 18)  SpO2: 100% (29 Dec 2022 05:51) (100% - 100%)    Parameters below as of 29 Dec 2022 05:51  Patient On (Oxygen Delivery Method): room air        PHYSICAL EXAM:    General: Thin appearing male resting in bed   HEENT: NC/AT; PERRL, anicteric sclera; MMM  Neck: supple  Cardiovascular: +S1/S2; RRR  Respiratory: CTA B/L; no W/R/R  Gastrointestinal: soft, NT/ND; +BSx4  Extremities: WWP; no edema, clubbing or cyanosis  Vascular: 2+ radial, DP/PT pulses B/L  Neurological: AAOx3; no focal deficits    MEDICATIONS:  MEDICATIONS  (STANDING):  bisacodyl 5 milliGRAM(s) Oral at bedtime  calcitriol   Capsule 0.25 MICROGram(s) Oral daily  folic acid 1 milliGRAM(s) Oral daily  heparin   Injectable 5000 Unit(s) SubCutaneous every 8 hours  NIFEdipine XL 30 milliGRAM(s) Oral daily  nystatin    Suspension 824165 Unit(s) Oral three times a day  pancrelipase  (CREON  6,000 Lipase Units) 1 Capsule(s) Oral three times a day with meals  pantoprazole  Injectable 40 milliGRAM(s) IV Push two times a day  piperacillin/tazobactam IVPB.. 3.375 Gram(s) IV Intermittent every 12 hours  senna 2 Tablet(s) Oral at bedtime  sucralfate suspension 1 Gram(s) Oral every 6 hours  thiamine 100 milliGRAM(s) Oral daily    MEDICATIONS  (PRN):  acetaminophen     Tablet .. 650 milliGRAM(s) Oral every 6 hours PRN Mild Pain (1 - 3), Moderate Pain (4 - 6)  cyclobenzaprine 5 milliGRAM(s) Oral three times a day PRN Muscle Spasm  ondansetron Injectable 4 milliGRAM(s) IV Push every 8 hours PRN Nausea and/or Vomiting  oxyCODONE    IR 7.5 milliGRAM(s) Oral every 4 hours PRN mod-severe pain      ALLERGIES:  Allergies    Tylenol (Unknown)    Intolerances        LABS:              CAPILLARY BLOOD GLUCOSE          RADIOLOGY & ADDITIONAL TESTS: Reviewed.    ASSESSMENT:    PLAN:

## 2022-12-29 NOTE — CHART NOTE - NSCHARTNOTEFT_GEN_A_CORE
Plan for AVF creation Tomorrow 12/30/22 with Dr. Oro    - Please document medicine risk stratification and clearance  - Please document cardiology risk stratification and clearance (pending stress test today)  - [id number]  - Anticoagulation: NONE  - SQH  - [yes/no] Contrast Allergy: NONE  - Preoperative workup:     O COVID within 72 hours of OR (Collect one day prior to OR)     O PTT and PT/INR within 72 hours of OR (collect one day prior to OR)     O CBC, BMP, Mg, Phos drawn at 4AM to provide time for correction if needed     O Type and Screen X2 (One within 72 hours of OR and at least one other this admission)  - Consent to be signed and placed into chart.      Vascular Surgery  [id number]

## 2022-12-29 NOTE — PROGRESS NOTE ADULT - PROBLEM SELECTOR PLAN 1
Hx of esophageal stricture (s/p stent on 5/2022 by Dr. Hernandez?)   intractable N/V, transferred to [facility] for esophageal stent removal by Dr. Erickson - performed showing severe esophagitis  - GI Consulted now s/p stent removal   - c/w PPI and Carafate - patient refusing   - c/w soft diet     # Epigastric pain   - likely secondary to above  - trops elevated on admission 210->199. Were significantly elevated at OSH   - EKG with T wave flattening in inferolateral leads, consistent with prior study.   - TTE showing new mild global LV dysfunction   [ ] will check NST, cards eval - cardiology recommending NST prior to OR however patient is having difficulty lying flat. Plan to give pain medications prior to the procedure to help the patient lay flat.

## 2022-12-29 NOTE — PROGRESS NOTE ADULT - SUBJECTIVE AND OBJECTIVE BOX
DATE OF SERVICE: 12-29-22     Patient is a 62y old  Male who presents with a chief complaint of intractable N/V , esophageal stent removal (29 Dec 2022 15:27)      INTERVAL HISTORY: feels ok    	  MEDICATIONS:  NIFEdipine XL 30 milliGRAM(s) Oral daily        PHYSICAL EXAM:  T(C): 36.7 (12-29-22 @ 21:30), Max: 36.8 (12-29-22 @ 05:51)  HR: 88 (12-29-22 @ 21:30) (81 - 88)  BP: 150/90 (12-29-22 @ 21:30) (134/78 - 151/80)  RR: 18 (12-29-22 @ 21:30) (18 - 18)  SpO2: 100% (12-29-22 @ 21:30) (100% - 100%)  Wt(kg): --  I&O's Summary        Appearance: In no distress	  HEENT:    PERRL, EOMI	  Cardiovascular:  S1 S2, No JVD  Respiratory: Lungs clear to auscultation	  Gastrointestinal:  Soft, Non-tender, + BS	  Vascularature:  No edema of LE  Psychiatric: Appropriate affect   Neuro: no acute focal deficits            Labs personally reviewed      Radiology: Personally reviewed by me -   < from: Xray Chest 1 View- PORTABLE-Routine (Xray Chest 1 View- PORTABLE-Routine .) (12.23.22 @ 15:30) >  IMPRESSION: Stable opacity in the right upper lobe. New probable   developing pneumonia in the right lower lobe.    < end of copied text >      Assessment and Plan:   · Assessment	  62 year old male with Hx of esophageal stricture (s/p stent on 5/2022 by Dr. Hernandez?), Emphysema, HCV, chronic pancreatitis, presenting from [facility] for esophageal stent removal due to intractable n/v. Admitted to [facility] from 11/25-12/14 for ams 2/2 Septic shock due to psuedomonal PNA, Lung abscess, morganella bacteremia and kelbsiella UTI c/b ATN requiring HD and intractable n/v.      Problem/Plan - 1:  ·  Problem: Preop Risk Stratification  ·  Plan: - trops elevated on admission 210->199.  - EKG with T wave flattening in inferolateral leads.   - TTE showing new mild global LV dysfunction   - NM stress test with inferior wall ischemia  - r/b of cath discussed with pt, agreeable to proceed. Plan for cath Friday with Dr Marcum    Problem/Plan - 2:  ·  Problem: HTN (hypertension).   ·  Plan: BP slightly elevated, although intermittently refusing BP meds   renal dopplers show left sided HESHAM 60-99%, vascular not recommending intervention   continue with Nifedipine 30mg QD.    Problem/Plan - 3:  ·  Problem: Need for prophylactic measure.   ·  Plan: DVT ppx: Heparin subq   Diet: Regular diet, monitor to see if tolerating           Talib Davison DO [facility]  Cardiovascular Medicine  [address] [place] [address], [address]  Office: [phone]  Available via Text/call on Microsoft Teams

## 2022-12-29 NOTE — PROGRESS NOTE ADULT - PROBLEM SELECTOR PLAN 5
S/p HD X 3  in ICU in [facility]   Cr plateaued at 5.7-6, off HD now  Avoid nephrotoxic, no contrast , no NSAID  VA renal duplex showing severe stenosis of the left renal artery   vascular consulted, no plan for intervention for HESHAM given normal blood pressures  Renally dose medications   nephro following  - Plan for AVF creation on this admission. Requires cardiology clearance prior to the OR. Cardiology recommending NST. Patient having difficulty lying flat for the procedure 2/2 back pain. Plan to give oxycodone prior to stress test to help alleviate the pain and help the patient to lay flat.   - Risk stratification: RCRI 1 - class II risk - 6% 30 day risk of death, MI or cardiac arrest. Pending NST prior to low risk procedure. Cardiology following.     # Anemia of chronic disease  s/p 1u prbc in [facility] and s/p Retacrit IV Fe    #Hypocalcemia  - inonidez calcium 0.96, slightly low  - Corrected calcium is normal   - started on calitriol, intermittently refusing   - Continue to monitor

## 2022-12-29 NOTE — PROGRESS NOTE ADULT - ASSESSMENT
62 year old male with Hx of esophageal stricture (s/p stent on 5/2022 by Dr. Hernandez?), Emphysema, HCV, chronic pancreatitis, presenting from [facility] for esophageal stent removal due to intractable n/v. Admitted to [facility] from 11/25-12/14 for ams 2/2 Septic shock due to psuedomonal PNA, Lung abscess, morganella bacteremia and kelbsiella UTI c/b ATN requiring HD and intractable n/v.

## 2022-12-30 NOTE — PROGRESS NOTE ADULT - PROBLEM SELECTOR PLAN 1
Pt with YUNIEL on CKD likely multifactorial in the setting of recent COVID infection, poor oral intake. Scr was elevated at 2.20 in September 2022. Pt was recently admitted at [facility]. Scr on admission was significantly elevated at 8.94 on 11/25/22. Pt received 1st HD session on 11/26/22. Last HD session was done on 12/1/22. HD catheter was removed as kidney function was recovering. Scr was elevated at 5.28 on transfer from [facility] on 12/15/22. Scr remains elevated/stable at 5 but no recent labs. No urgent indication for HD today.  Of note kidney sonogram had discrepant size renal artery duplex showing HESHAM but BP well controlled.  I spoke with Dr. Dukes (12/21) who knows the patient very well and has seen him in patient and outpatient over the last several years.  As per Dr. Hanks the patient's baseline creatinine is 3 and has advanced CKD and is nearing ESKD.  Patient is pending cardiac evaluation for clearance for AVF.  NST was elevated and is planned for cardiac catheterization.  I explained to the patient the risk of worsening kidney after catheterization.  He understands that we will continue to monitor closely for dialysis indications.

## 2022-12-30 NOTE — PROGRESS NOTE ADULT - PROBLEM SELECTOR PLAN 10
DVT ppx: Heparin subq   Diet: Regular diet, monitor to see if tolerating   Dispo: Plan for [facility] pending medical stabilization

## 2022-12-30 NOTE — PROGRESS NOTE ADULT - ASSESSMENT
62 M with YUNIEL on advanced CKD nearing ESRD, intermittent HD with HD catheter in the past 2 years.   Vascular surgery is consulted for AVF planning.     Plan:     - AVF creation deferred for today  - Recommend ID consult  - F/u bilateral upper extremity vein mapping  - Protect LUE(non-dominant):  No BPs, IVs, blood draws  - Please document medical/cardiology clearance for AVF  - no surgical intervention for L kidney artery stenosis given controlled BP  - Care per primary team     Vascular Surgery  #[id number] 62 M with YUNIEL on advanced CKD nearing ESRD, intermittent HD with HD catheter in the past 2 years.   Vascular surgery is consulted for AVF planning.     Plan:     - AVF creation deferred for today  - Recommend ID consult  - F/u cath results  - F/u bilateral upper extremity vein mapping  - Protect LUE(non-dominant):  No BPs, IVs, blood draws  - Please document medical/cardiology clearance for AVF  - no surgical intervention for L kidney artery stenosis given controlled BP  - Care per primary team     Vascular Surgery  #[id number]

## 2022-12-30 NOTE — PROGRESS NOTE ADULT - SUBJECTIVE AND OBJECTIVE BOX
Surgery Progress Note    Subjective:     Patient seen and examined at bedside. VSS, AF. OR cancelled for today in anticipation of cardiac cath     OBJECTIVE:     T(C): 36.8 (12-30-22 @ 05:25), Max: 36.8 (12-29-22 @ 12:10)  HR: 80 (12-30-22 @ 05:25) (80 - 88)  BP: 126/72 (12-30-22 @ 05:25) (126/72 - 151/80)  RR: 18 (12-30-22 @ 05:25) (18 - 18)  SpO2: 100% (12-30-22 @ 05:25) (100% - 100%)  Wt(kg): --    I&O's Detail      PHYSICAL EXAM:    Neuro: A+Ox3  HEENT: NC/AT, EOMI  Neck: Soft, supple  Cardio: RRR  Resp: Good effort, CTA b/l  Thorax: No chest wall tenderness  Breast: No lesions/masses, no drainage  GI/Abd: Soft, NT/ND, no rebound/guarding, no masses palpated  Vascular: All 4 extremities warm. b/l UE with palpable strong radial and ulnar pulse.   Skin: Intact, no breakdown  Lymphatic/Nodes: No palpable lymphadenopathy  Musculoskeletal: All 4 extremities moving spontaneously, no limitations    MEDICATIONS  (STANDING):  bisacodyl 5 milliGRAM(s) Oral at bedtime  calcitriol   Capsule 0.25 MICROGram(s) Oral daily  folic acid 1 milliGRAM(s) Oral daily  heparin   Injectable 5000 Unit(s) SubCutaneous every 8 hours  NIFEdipine XL 30 milliGRAM(s) Oral daily  nystatin    Suspension 293043 Unit(s) Oral three times a day  pancrelipase  (CREON  6,000 Lipase Units) 1 Capsule(s) Oral three times a day with meals  pantoprazole  Injectable 40 milliGRAM(s) IV Push two times a day  piperacillin/tazobactam IVPB.. 3.375 Gram(s) IV Intermittent every 12 hours  senna 2 Tablet(s) Oral at bedtime  sucralfate suspension 1 Gram(s) Oral every 6 hours  thiamine 100 milliGRAM(s) Oral daily    MEDICATIONS  (PRN):  acetaminophen     Tablet .. 650 milliGRAM(s) Oral every 6 hours PRN Mild Pain (1 - 3), Moderate Pain (4 - 6)  cyclobenzaprine 5 milliGRAM(s) Oral three times a day PRN Muscle Spasm  ondansetron Injectable 4 milliGRAM(s) IV Push every 8 hours PRN Nausea and/or Vomiting  oxyCODONE    IR 7.5 milliGRAM(s) Oral every 4 hours PRN mod-severe pain      LABS:                          8.3    7.26  )-----------( 241      ( 30 Dec 2022 05:15 )             26.7     12-30    141  |  113<H>  |  58<H>  ----------------------------<  75  3.7   |  16<L>  |  5.81<H>    Ca    7.7<L>      30 Dec 2022 05:15  Phos  7.8     12-30  Mg     1.90     12-30    TPro  5.3<L>  /  Alb  2.3<L>  /  TBili  <0.2  /  DBili  x   /  AST  21  /  ALT  18  /  AlkPhos  130<H>  12-30    PTT - ( 30 Dec 2022 05:15 )  PTT:29.2 sec

## 2022-12-30 NOTE — PROGRESS NOTE ADULT - SUBJECTIVE AND OBJECTIVE BOX
DATE OF SERVICE: 12-30-22 @ 14:06    Patient is a 62y old  Male who presents with a chief complaint of intractable N/V , esophageal stent removal (30 Dec 2022 10:19)      INTERVAL HISTORY: diarrhea     	  MEDICATIONS:  NIFEdipine XL 30 milliGRAM(s) Oral daily        PHYSICAL EXAM:  T(C): 36.8 (12-30-22 @ 05:25), Max: 36.8 (12-30-22 @ 05:25)  HR: 80 (12-30-22 @ 05:25) (80 - 88)  BP: 126/72 (12-30-22 @ 05:25) (126/72 - 151/80)  RR: 18 (12-30-22 @ 05:25) (18 - 18)  SpO2: 100% (12-30-22 @ 05:25) (100% - 100%)  Wt(kg): --  I&O's Summary        Appearance: In no distress	  HEENT:    PERRL, EOMI	  Cardiovascular:  S1 S2, No JVD  Respiratory: Lungs clear to auscultation	  Gastrointestinal:  Soft, Non-tender, + BS	  Vascularature:  No edema of LE  Psychiatric: Appropriate affect   Neuro: no acute focal deficits                               8.3    7.26  )-----------( 241      ( 30 Dec 2022 05:15 )             26.7     12-30    141  |  113<H>  |  58<H>  ----------------------------<  75  3.7   |  16<L>  |  5.81<H>    Ca    7.7<L>      30 Dec 2022 05:15  Phos  7.8     12-30  Mg     1.90     12-30    TPro  5.3<L>  /  Alb  2.3<L>  /  TBili  <0.2  /  DBili  x   /  AST  21  /  ALT  18  /  AlkPhos  130<H>  12-30        Labs personally reviewed      Assessment and Plan:   · Assessment	  62 year old male with Hx of esophageal stricture (s/p stent on 5/2022 by Dr. Hernandez?), Emphysema, HCV, chronic pancreatitis, presenting from [facility] for esophageal stent removal due to intractable n/v. Admitted to [facility] from 11/25-12/14 for ams 2/2 Septic shock due to psuedomonal PNA, Lung abscess, morganella bacteremia and kelbsiella UTI c/b ATN requiring HD and intractable n/v.      Problem/Plan - 1:  ·  Problem: Preop Risk Stratification  ·  Plan: - trops elevated on admission 210->199.  - EKG with T wave flattening in inferolateral leads.   - TTE showing new mild global LV dysfunction   - NM stress test with inferior wall ischemia  - r/b of cath discussed with pt, agreeable to proceed. Taken down to cath lab 12/30 but sent up as pt with active diarrhea  - will try again on Tuesday    Problem/Plan - 2:  ·  Problem: HTN (hypertension).   ·  Plan: BP slightly elevated, although intermittently refusing BP meds   renal dopplers show left sided HESHAM 60-99%, vascular not recommending intervention   continue with Nifedipine 30mg QD.    Problem/Plan - 3:  ·  Problem: Need for prophylactic measure.   ·  Plan: DVT ppx: Heparin subq   Diet: Regular diet, monitor to see if tolerating               Talib Davison DO [facility]  Cardiovascular Medicine  [address] [place] [address], [address]  Office: [phone]  Available via Text/call on [employer] Teams

## 2022-12-30 NOTE — PROGRESS NOTE ADULT - PROBLEM SELECTOR PLAN 6
S/p HD X 3  in ICU in [facility]   Cr plateaued at 5.7-6, off HD now  Avoid nephrotoxic, no contrast , no NSAID  VA renal duplex showing severe stenosis of the left renal artery   vascular consulted, no plan for intervention for HESHAM given normal blood pressures  Renally dose medications   nephro following  - Plan for AVF creation on this admission. Requires cardiology clearance prior to the OR. Cardiology recommending NST. Patient having difficulty lying flat for the procedure 2/2 back pain. Plan to give oxycodone prior to stress test to help alleviate the pain and help the patient to lay flat.   - Risk stratification: RCRI 1 - class II risk - 6% 30 day risk of death, MI or cardiac arrest. Pending NST prior to low risk procedure. Cardiology following.     # Anemia of chronic disease  s/p 1u prbc in [facility] and s/p Retacrit IV Fe    #Hypocalcemia  - inonidez calcium 0.96, slightly low  - Corrected calcium is normal   - started on calitriol, intermittently refusing   - Continue to monitor

## 2022-12-30 NOTE — PROGRESS NOTE ADULT - PROBLEM SELECTOR PLAN 2
Multiple episodes of diarrhea on antibiotics. Also with history of chronic pancreatitis which may be contributing. On Creon.   - D/c bowel regimen  - If still with multiple episodes of diarrhea off of bowel regimen, consider sending c. dif  - Consider increasing Creon

## 2022-12-30 NOTE — PROGRESS NOTE ADULT - PROBLEM SELECTOR PLAN 2
Secondary hyper parathyroidism.  ionized calcium was low.  continue calcitriol trend Ca. please repeat ionized calcium

## 2022-12-30 NOTE — PROGRESS NOTE ADULT - SUBJECTIVE AND OBJECTIVE BOX
Hospitalist Progress Note  Yessy Montelongo MD Pager # [phone]    OVERNIGHT EVENTS: YOSSI    SUBJECTIVE / INTERVAL HPI: Patient seen and examined at bedside. Patient reports ongoing diarrhea. Multiple episodes. He continues to have aches/pains. He has phlegm and spit up but this is chronic.     VITAL SIGNS:  Vital Signs Last 24 Hrs  T(C): 36.8 (30 Dec 2022 05:25), Max: 36.8 (30 Dec 2022 05:25)  T(F): 98.2 (30 Dec 2022 05:25), Max: 98.2 (30 Dec 2022 05:25)  HR: 80 (30 Dec 2022 05:25) (80 - 88)  BP: 126/72 (30 Dec 2022 05:25) (126/72 - 151/80)  BP(mean): --  RR: 18 (30 Dec 2022 05:25) (18 - 18)  SpO2: 100% (30 Dec 2022 05:25) (100% - 100%)    Parameters below as of 30 Dec 2022 05:25  Patient On (Oxygen Delivery Method): room air        PHYSICAL EXAM:    General: Thin appearing male resting in bed - diffuse pain   HEENT: NC/AT; PERRL, anicteric sclera; MMM  Neck: supple  Cardiovascular: +S1/S2; RRR  Respiratory: CTA B/L; no W/R/R  Gastrointestinal: soft, NT/ND; +BSx4  Extremities: WWP; no edema, clubbing or cyanosis  Vascular: 2+ radial, DP/PT pulses B/L  Neurological: AAOx3; no focal deficits    MEDICATIONS:  MEDICATIONS  (STANDING):  bisacodyl 5 milliGRAM(s) Oral at bedtime  calcitriol   Capsule 0.25 MICROGram(s) Oral daily  folic acid 1 milliGRAM(s) Oral daily  heparin   Injectable 5000 Unit(s) SubCutaneous every 8 hours  NIFEdipine XL 30 milliGRAM(s) Oral daily  nystatin    Suspension [id number] Unit(s) Oral three times a day  pancrelipase  (CREON  6,000 Lipase Units) 1 Capsule(s) Oral three times a day with meals  pantoprazole  Injectable 40 milliGRAM(s) IV Push two times a day  piperacillin/tazobactam IVPB.. 3.375 Gram(s) IV Intermittent every 12 hours  senna 2 Tablet(s) Oral at bedtime  sucralfate suspension 1 Gram(s) Oral every 6 hours  thiamine 100 milliGRAM(s) Oral daily    MEDICATIONS  (PRN):  acetaminophen     Tablet .. 650 milliGRAM(s) Oral every 6 hours PRN Mild Pain (1 - 3), Moderate Pain (4 - 6)  cyclobenzaprine 5 milliGRAM(s) Oral three times a day PRN Muscle Spasm  ondansetron Injectable 4 milliGRAM(s) IV Push every 8 hours PRN Nausea and/or Vomiting  oxyCODONE    IR 7.5 milliGRAM(s) Oral every 4 hours PRN mod-severe pain      ALLERGIES:  Allergies    Tylenol (Unknown)    Intolerances        LABS:                        8.3    7.26  )-----------( 241      ( 30 Dec 2022 05:15 )             26.7     12-30    141  |  113<H>  |  58<H>  ----------------------------<  75  3.7   |  16<L>  |  5.81<H>    Ca    7.7<L>      30 Dec 2022 05:15  Phos  7.8     12-30  Mg     1.90     12-30    TPro  5.3<L>  /  Alb  2.3<L>  /  TBili  <0.2  /  DBili  x   /  AST  21  /  ALT  18  /  AlkPhos  130<H>  12-30    PTT - ( 30 Dec 2022 05:15 )  PTT:29.2 sec    CAPILLARY BLOOD GLUCOSE          RADIOLOGY & ADDITIONAL TESTS: Reviewed.    ASSESSMENT:    PLAN:

## 2022-12-30 NOTE — PROGRESS NOTE ADULT - PROBLEM SELECTOR PLAN 4
Anemia noted to be worsening compared to last week.  Please check iron studies.  will consider EMMANUEL.

## 2022-12-30 NOTE — PROGRESS NOTE ADULT - PROBLEM SELECTOR PLAN 1
Hx of esophageal stricture (s/p stent on 5/2022 by Dr. Hernandez?)   intractable N/V, transferred to [facility] for esophageal stent removal by Dr. Erickson - performed showing severe esophagitis  - GI Consulted now s/p stent removal   - c/w PPI and Carafate - patient refusing   - c/w soft diet     # Epigastric pain   - likely secondary to above  - trops elevated on admission 210->199. Were significantly elevated at OSH   - EKG with T wave flattening in inferolateral leads, consistent with prior study.   - TTE showing new mild global LV dysfunction   - NST positive with inferior wall motion abnormality. Pending cath but did not perform given multiple episodes of diarrhea.

## 2022-12-30 NOTE — PROGRESS NOTE ADULT - ASSESSMENT
62 year old male with Hx of esophageal stricture (s/p stent on 5/2022 by Dr. Hernandez?), Emphysema, HCV, chronic pancreatitis, presenting from [facility] for esophageal stent removal due to intractable n/v. Admitted to [facility] from 11/25-12/14 for ams 2/2 Septic shock due to psuedomonal PNA, Lung abscess, morganella bacteremia and kelbsiella UTI c/b ATN requiring HD and intractable n/v.

## 2022-12-30 NOTE — PROGRESS NOTE ADULT - SUBJECTIVE AND OBJECTIVE BOX
[facility] Division of Kidney Diseases & Hypertension  FOLLOW UP NOTE  --------------------------------------------------------------------------------    HPI: Pt is a 62-year-old Male with Hx of esophageal stricture (S/p stenting in 5/22), Emphysema, HCV, chronic pancreatitis who initially presented to [facility] on 11/25/22 for AMS. Pt was admitted to [facility] on 11/25 for AMS 2/2 Septic shock.. Pt was initially intubated for acute respiratory failure and required pressors for hypotension. Pt's hospital course was complicated by YUNIEL/ATN requiring HD. Pt was extubated and was transferred to [facility] for esophageal stent removal by Dr. Erickson. Initial labs on this admission concerning for elevated Scr. Nephrology team following for YUNIEL on CKD.     On review of [facility], it was noted that Scr was elevated at 2.20 in September 2022. Scr on admission was significantly elevated at 8.94 on 11/25/22. Pt received 1st HD session on 11/26/22. Last HD session was done on 12/1/22. HD catheter was removed as kidney function was recovering. Scr was elevated at 5.28 on transfer from [facility] on 12/15/22. Scr remains elevated/stable at 5 range.  Seen and evaluated this morning no chest pain no shortness of breath most urgent complaint is that he feels very hungry.    PAST HISTORY  --------------------------------------------------------------------------------  No significant changes to PMH, PSH, FHx, SHx, unless otherwise noted    ALLERGIES & MEDICATIONS  --------------------------------------------------------------------------------  Allergies    Tylenol (Unknown)    Intolerances      Standing Inpatient Medications  bisacodyl 5 milliGRAM(s) Oral at bedtime  calcitriol   Capsule 0.25 MICROGram(s) Oral daily  folic acid 1 milliGRAM(s) Oral daily  heparin   Injectable 5000 Unit(s) SubCutaneous every 8 hours  NIFEdipine XL 30 milliGRAM(s) Oral daily  nystatin    Suspension 616975 Unit(s) Oral three times a day  pancrelipase  (CREON  6,000 Lipase Units) 1 Capsule(s) Oral three times a day with meals  pantoprazole  Injectable 40 milliGRAM(s) IV Push two times a day  piperacillin/tazobactam IVPB.. 3.375 Gram(s) IV Intermittent every 12 hours  senna 2 Tablet(s) Oral at bedtime  sucralfate suspension 1 Gram(s) Oral every 6 hours  thiamine 100 milliGRAM(s) Oral daily    PRN Inpatient Medications  acetaminophen     Tablet .. 650 milliGRAM(s) Oral every 6 hours PRN  cyclobenzaprine 5 milliGRAM(s) Oral three times a day PRN  ondansetron Injectable 4 milliGRAM(s) IV Push every 8 hours PRN  oxyCODONE    IR 7.5 milliGRAM(s) Oral every 4 hours PRN      REVIEW OF SYSTEMS  --------------------------------------------------------------------------------  Gen: no fever  Respiratory: not sob  CV: no cp  GI: no ab pain  : no complaints  MSK: no pain at time of examiantion    VITALS/PHYSICAL EXAM  --------------------------------------------------------------------------------  T(C): 36.8 (12-30-22 @ 05:25), Max: 36.8 (12-29-22 @ 12:10)  HR: 80 (12-30-22 @ 05:25) (80 - 88)  BP: 126/72 (12-30-22 @ 05:25) (126/72 - 151/80)  ABP: --  ABP(mean): --  RR: 18 (12-30-22 @ 05:25) (18 - 18)  SpO2: 100% (12-30-22 @ 05:25) (100% - 100%)  CVP(mm Hg): --        Physical Exam:  	Gen: ill appearing, frail  	HEENT: dry   	Pulm: CTA today  	CV: S1S2  	Abd: Soft, +BS   	Ext: No LE edema B/L  	Neuro: Awake and alert   	Skin: Warm and dry      LABS/STUDIES  --------------------------------------------------------------------------------              8.3    7.26  >-----------<  241      [12-30-22 @ 05:15]              26.7     141  |  113  |  58  ----------------------------<  75      [12-30-22 @ 05:15]  3.7   |  16  |  5.81        Ca     7.7     [12-30-22 @ 05:15]      Mg     1.90     [12-30-22 @ 05:15]      Phos  7.8     [12-30-22 @ 05:15]    TPro  5.3  /  Alb  2.3  /  TBili  <0.2  /  DBili  x   /  AST  21  /  ALT  18  /  AlkPhos  130  [12-30-22 @ 05:15]      PTT: 29.2       [12-30-22 @ 05:15]      Creatinine Trend:  SCr 5.81 [12-30 @ 05:15]  SCr 5.94 [12-25 @ 06:34]  SCr 5.70 [12-23 @ 11:55]  SCr 5.86 [12-22 @ 06:40]  SCr 5.57 [12-21 @ 08:04]

## 2022-12-31 NOTE — PROGRESS NOTE ADULT - PROBLEM SELECTOR PLAN 2
Secondary hyper parathyroidism.  ionized calcium was low so we started him on calcitriol.  However, now the patient's phosphorus is trending up.  Recommend please checked ionized calcium and start sevelamer 800 TID with meals and monitor serum phophorus.

## 2022-12-31 NOTE — PROGRESS NOTE ADULT - PROBLEM SELECTOR PLAN 1
Pt with YUNIEL on CKD likely multifactorial in the setting of recent COVID infection, poor oral intake. Scr was elevated at 2.20 in September 2022. Pt was recently admitted at [facility]. Scr on admission was significantly elevated at 8.94 on 11/25/22. Pt received 1st HD session on 11/26/22. Last HD session was done on 12/1/22. HD catheter was removed as kidney function was recovering. Scr was elevated at 5.28 on transfer from [facility] on 12/15/22. Scr remains elevated/stable at 5. No urgent indication for HD today.  Of note kidney sonogram had discrepant size renal artery duplex showing HESHAM but BP well controlled.  I spoke with Dr. Dukes (12/21) who knows the patient very well and has seen him in patient and outpatient over the last several years.  As per Dr. Hanks the patient's baseline creatinine is 3 and has advanced CKD and is nearing ESKD.  Patient is pending cardiac evaluation for clearance for AVF.  NST was elevated and is planned for cardiac catheterization.  I explained to the patient the risk of worsening kidney after catheterization.  He understands that we will continue to monitor closely for dialysis indications.  Recommend  cc bolus 1 hour prior to catheterization and 60 cc / hr for 6 hours afterwards.

## 2022-12-31 NOTE — PROGRESS NOTE ADULT - PROBLEM SELECTOR PLAN 6
S/p HD X 3  in ICU in [facility]   Cr plateaued at 5.7-6, off HD now  Avoid nephrotoxic, no contrast , no NSAID  VA renal duplex showing severe stenosis of the left renal artery   vascular consulted, no plan for intervention for HESHAM given normal blood pressures  Renally dose medications   nephro following  - Plan for AVF creation on this admission. Requires cardiology clearance prior to the OR. Cardiology recommending NST. Patient having difficulty lying flat for the procedure 2/2 back pain. Plan to give oxycodone prior to stress test to help alleviate the pain and help the patient to lay flat.   - Risk stratification: RCRI 1 - class II risk - 6% 30 day risk of death, MI or cardiac arrest. Pending NST prior to low risk procedure. Cardiology following.     # Anemia of chronic disease  s/p 1u prbc in [facility] and s/p Retacrit IV Fe    #Hypocalcemia  - inonidez calcium 0.96, slightly low  - Corrected calcium is normal   - started on calitriol, intermittently refusing   - Continue to monitor

## 2022-12-31 NOTE — PROGRESS NOTE ADULT - PROBLEM SELECTOR PLAN 1
Hx of esophageal stricture (s/p stent on 5/2022 by Dr. Hernandez?)   intractable N/V, transferred to [facility] for esophageal stent removal by Dr. Erickson - performed showing severe esophagitis  - GI Consulted now s/p stent removal   - c/w PPI and Carafate - patient refusing   - c/w soft diet     # Epigastric pain   - likely secondary to above  - trops elevated on admission 210->199. Were significantly elevated at OSH   - EKG with T wave flattening in inferolateral leads, consistent with prior study.   - TTE showing new mild global LV dysfunction   - NST positive with inferior wall motion abnormality. Pending cath but did not perform given multiple episodes of diarrhea.

## 2022-12-31 NOTE — PROGRESS NOTE ADULT - PROBLEM SELECTOR PLAN 3
Phosphorus noted to be elevated.  Please start sevelamer 800 TID with meals check ionized calcium as above.

## 2022-12-31 NOTE — PROGRESS NOTE ADULT - SUBJECTIVE AND OBJECTIVE BOX
[facility] Division of Kidney Diseases & Hypertension  FOLLOW UP NOTE  --------------------------------------------------------------------------------  HPI: Pt is a 62-year-old Male with Hx of esophageal stricture (S/p stenting in 5/22), Emphysema, HCV, chronic pancreatitis who initially presented to [facility] on 11/25/22 for AMS. Pt was admitted to [facility] on 11/25 for AMS 2/2 Septic shock. Pt was initially intubated for acute respiratory failure and required pressors for hypotension. Pt's hospital course was complicated by YUNIEL/ATN requiring HD. Pt was extubated and was transferred to [facility] for esophageal stent removal by Dr. Erickson. Initial labs on this admission concerning for elevated Scr. Nephrology team following for YUNIEL on CKD.     On review of [facility], it was noted that Scr was elevated at 2.20 in September 2022. Scr on admission was significantly elevated at 8.94 on 11/25/22. Pt received 1st HD session on 11/26/22. Last HD session was done on 12/1/22. HD catheter was removed as kidney function was recovering. Scr was elevated at 5.28 on transfer from [facility] on 12/15/22. Scr remains elevated/stable at 5 range.  Seen and evaluated this morning no chest pain no shortness of breath most urgent complaint is that he feels very hungry still.  He was supposed to get cardiac catheterization but procedure was cancelled due to diarrhea.    PAST HISTORY  --------------------------------------------------------------------------------  No significant changes to PMH, PSH, FHx, SHx, unless otherwise noted    ALLERGIES & MEDICATIONS  --------------------------------------------------------------------------------  Allergies    Tylenol (Unknown)    Intolerances      Standing Inpatient Medications  calcitriol   Capsule 0.25 MICROGram(s) Oral daily  folic acid 1 milliGRAM(s) Oral daily  heparin   Injectable 5000 Unit(s) SubCutaneous every 8 hours  NIFEdipine XL 30 milliGRAM(s) Oral daily  nystatin    Suspension 691390 Unit(s) Oral three times a day  pancrelipase  (CREON  6,000 Lipase Units) 1 Capsule(s) Oral three times a day with meals  pantoprazole  Injectable 40 milliGRAM(s) IV Push two times a day  piperacillin/tazobactam IVPB.. 3.375 Gram(s) IV Intermittent every 12 hours  sucralfate suspension 1 Gram(s) Oral every 6 hours  thiamine 100 milliGRAM(s) Oral daily    PRN Inpatient Medications  acetaminophen     Tablet .. 650 milliGRAM(s) Oral every 6 hours PRN  cyclobenzaprine 5 milliGRAM(s) Oral three times a day PRN  ondansetron Injectable 4 milliGRAM(s) IV Push every 8 hours PRN  oxyCODONE    IR 7.5 milliGRAM(s) Oral every 4 hours PRN      REVIEW OF SYSTEMS  --------------------------------------------------------------------------------  Gen: no fever  Respiratory: no sob  CV: no cp  GI: very hungry  : no complaints  MSK: no pain    VITALS/PHYSICAL EXAM  --------------------------------------------------------------------------------  T(C): 36.8 (12-31-22 @ 09:00), Max: 36.8 (12-30-22 @ 12:00)  HR: 94 (12-31-22 @ 09:00) (86 - 94)  BP: 155/78 (12-31-22 @ 09:00) (136/78 - 155/78)  ABP: --  ABP(mean): --  RR: 18 (12-31-22 @ 09:00) (18 - 18)  SpO2: 100% (12-31-22 @ 09:00) (100% - 100%)  CVP(mm Hg): --    Physical Exam:  	Gen: ill appearing, frail  	HEENT: dry   	Pulm: CTA today  	CV: S1S2  	Abd: Soft, +BS   	Ext: No LE edema B/L  	Neuro: Awake and alert   	Skin: Warm and dry    LABS/STUDIES  --------------------------------------------------------------------------------              8.3    7.26  >-----------<  241      [12-30-22 @ 05:15]              26.7     141  |  113  |  58  ----------------------------<  75      [12-30-22 @ 05:15]  3.7   |  16  |  5.81        Ca     7.7     [12-30-22 @ 05:15]      Mg     1.90     [12-30-22 @ 05:15]      Phos  7.8     [12-30-22 @ 05:15]    TPro  5.3  /  Alb  2.3  /  TBili  <0.2  /  DBili  x   /  AST  21  /  ALT  18  /  AlkPhos  130  [12-30-22 @ 05:15]      PTT: 29.2       [12-30-22 @ 05:15]      Creatinine Trend:  SCr 5.81 [12-30 @ 05:15]  SCr 5.94 [12-25 @ 06:34]  SCr 5.70 [12-23 @ 11:55]  SCr 5.86 [12-22 @ 06:40]  SCr 5.57 [12-21 @ 08:04]

## 2022-12-31 NOTE — PROGRESS NOTE ADULT - PROBLEM SELECTOR PLAN 2
Multiple episodes of diarrhea on antibiotics. Also with history of chronic pancreatitis which may be contributing. On Creon but refusing which is likely why he is having diarrhea.   - D/c bowel regimen  - If still with multiple episodes of diarrhea off of bowel regimen, consider sending c. dif

## 2022-12-31 NOTE — PROGRESS NOTE ADULT - SUBJECTIVE AND OBJECTIVE BOX
Hospitalist Progress Note  Yessy Montelongo MD Pager # [phone]    OVERNIGHT EVENTS: YOSSI    SUBJECTIVE / INTERVAL HPI: Patient seen and examined at bedside. Patient reports that he does not take Creon because it hurts his stomach. He continues to have chest and back pain. No diarrhea this AM.     VITAL SIGNS:  Vital Signs Last 24 Hrs  T(C): 36.8 (31 Dec 2022 13:15), Max: 36.8 (30 Dec 2022 18:21)  T(F): 98.3 (31 Dec 2022 13:15), Max: 98.3 (31 Dec 2022 13:15)  HR: 92 (31 Dec 2022 13:15) (88 - 94)  BP: 144/73 (31 Dec 2022 13:15) (136/78 - 155/78)  BP(mean): --  RR: 18 (31 Dec 2022 13:15) (18 - 18)  SpO2: 100% (31 Dec 2022 13:15) (100% - 100%)    Parameters below as of 31 Dec 2022 13:15  Patient On (Oxygen Delivery Method): room air        PHYSICAL EXAM:    General: Thin appearing male resting in bed   HEENT: NC/AT; PERRL, anicteric sclera; MMM  Neck: supple  Cardiovascular: +S1/S2; RRR  Respiratory: CTA B/L; no W/R/R  Gastrointestinal: soft, NT/ND; +BSx4  Extremities: WWP; no edema, clubbing or cyanosis  Vascular: 2+ radial, DP/PT pulses B/L  Neurological: AAOx3; no focal deficits    MEDICATIONS:  MEDICATIONS  (STANDING):  calcitriol   Capsule 0.25 MICROGram(s) Oral daily  folic acid 1 milliGRAM(s) Oral daily  heparin   Injectable 5000 Unit(s) SubCutaneous every 8 hours  NIFEdipine XL 30 milliGRAM(s) Oral daily  nystatin    Suspension 899059 Unit(s) Oral three times a day  pancrelipase  (CREON  6,000 Lipase Units) 1 Capsule(s) Oral three times a day with meals  pantoprazole  Injectable 40 milliGRAM(s) IV Push two times a day  piperacillin/tazobactam IVPB.. 3.375 Gram(s) IV Intermittent every 12 hours  sevelamer carbonate 800 milliGRAM(s) Oral three times a day  sucralfate suspension 1 Gram(s) Oral every 6 hours  thiamine 100 milliGRAM(s) Oral daily    MEDICATIONS  (PRN):  acetaminophen     Tablet .. 650 milliGRAM(s) Oral every 6 hours PRN Mild Pain (1 - 3), Moderate Pain (4 - 6)  cyclobenzaprine 5 milliGRAM(s) Oral three times a day PRN Muscle Spasm  ondansetron Injectable 4 milliGRAM(s) IV Push every 8 hours PRN Nausea and/or Vomiting  oxyCODONE    IR 7.5 milliGRAM(s) Oral every 4 hours PRN mod-severe pain      ALLERGIES:  Allergies    Tylenol (Unknown)    Intolerances        LABS:                        8.3    7.26  )-----------( 241      ( 30 Dec 2022 05:15 )             26.7     12-30    141  |  113<H>  |  58<H>  ----------------------------<  75  3.7   |  16<L>  |  5.81<H>    Ca    7.7<L>      30 Dec 2022 05:15  Phos  7.8     12-30  Mg     1.90     12-30    TPro  5.3<L>  /  Alb  2.3<L>  /  TBili  <0.2  /  DBili  x   /  AST  21  /  ALT  18  /  AlkPhos  130<H>  12-30    PTT - ( 30 Dec 2022 05:15 )  PTT:29.2 sec    CAPILLARY BLOOD GLUCOSE          RADIOLOGY & ADDITIONAL TESTS: Reviewed.    ASSESSMENT:    PLAN:

## 2022-12-31 NOTE — PROVIDER CONTACT NOTE (OTHER) - ACTION/TREATMENT ORDERED:
ACP notified and aware. attending aware as well. pt educated on importance of medication and compliance. [employer] med info printed. pt took pancreatitis med. will continue to educate and evaluate.

## 2022-12-31 NOTE — PROGRESS NOTE ADULT - ASSESSMENT
62 year old male with Hx of esophageal stricture (s/p stent on 5/2022 by Dr. Hernandez?), Emphysema, HCV, chronic pancreatitis, presenting from [facility] for esophageal stent removal due to intractable n/v. Admitted to [facility] from 11/25-12/14 for ams 2/2 Septic shock due to psuedomonal PNA, Lung abscess, morganella bacteremia and kelbsiella UTI c/b ATN requiring HD and intractable n/v.

## 2023-01-01 ENCOUNTER — NON-APPOINTMENT (OUTPATIENT)
Age: 63
End: 2023-01-01

## 2023-01-01 ENCOUNTER — APPOINTMENT (OUTPATIENT)
Dept: INTERNAL MEDICINE | Facility: CLINIC | Age: 63
End: 2023-01-01

## 2023-01-01 ENCOUNTER — RESULT REVIEW (OUTPATIENT)
Age: 63
End: 2023-01-01

## 2023-01-01 ENCOUNTER — TRANSCRIPTION ENCOUNTER (OUTPATIENT)
Age: 63
End: 2023-01-01

## 2023-01-01 ENCOUNTER — APPOINTMENT (OUTPATIENT)
Dept: CARDIOLOGY | Facility: CLINIC | Age: 63
End: 2023-01-01
Payer: MEDICAID

## 2023-01-01 ENCOUNTER — APPOINTMENT (OUTPATIENT)
Dept: SURGICAL ONCOLOGY | Facility: CLINIC | Age: 63
End: 2023-01-01
Payer: MEDICAID

## 2023-01-01 ENCOUNTER — APPOINTMENT (OUTPATIENT)
Dept: CARDIOLOGY | Facility: CLINIC | Age: 63
End: 2023-01-01

## 2023-01-01 ENCOUNTER — APPOINTMENT (OUTPATIENT)
Dept: INTERNAL MEDICINE | Facility: CLINIC | Age: 63
End: 2023-01-01
Payer: MEDICAID

## 2023-01-01 ENCOUNTER — INPATIENT (INPATIENT)
Facility: HOSPITAL | Age: 63
LOS: 10 days | Discharge: HOME HEALTH | End: 2023-09-04
Attending: INTERNAL MEDICINE | Admitting: INTERNAL MEDICINE
Payer: COMMERCIAL

## 2023-01-01 ENCOUNTER — OUTPATIENT (OUTPATIENT)
Dept: OUTPATIENT SERVICES | Facility: HOSPITAL | Age: 63
LOS: 1 days | End: 2023-01-01

## 2023-01-01 ENCOUNTER — APPOINTMENT (OUTPATIENT)
Dept: PAIN MANAGEMENT | Facility: CLINIC | Age: 63
End: 2023-01-01
Payer: MEDICAID

## 2023-01-01 ENCOUNTER — APPOINTMENT (OUTPATIENT)
Dept: PAIN MANAGEMENT | Facility: CLINIC | Age: 63
End: 2023-01-01

## 2023-01-01 ENCOUNTER — APPOINTMENT (OUTPATIENT)
Dept: GASTROENTEROLOGY | Facility: CLINIC | Age: 63
End: 2023-01-01

## 2023-01-01 ENCOUNTER — APPOINTMENT (OUTPATIENT)
Dept: VASCULAR SURGERY | Facility: CLINIC | Age: 63
End: 2023-01-01

## 2023-01-01 ENCOUNTER — APPOINTMENT (OUTPATIENT)
Dept: NEPHROLOGY | Facility: CLINIC | Age: 63
End: 2023-01-01

## 2023-01-01 ENCOUNTER — INPATIENT (INPATIENT)
Facility: HOSPITAL | Age: 63
LOS: 13 days | Discharge: HOME | End: 2023-10-19
Attending: STUDENT IN AN ORGANIZED HEALTH CARE EDUCATION/TRAINING PROGRAM | Admitting: STUDENT IN AN ORGANIZED HEALTH CARE EDUCATION/TRAINING PROGRAM
Payer: MEDICAID

## 2023-01-01 ENCOUNTER — INPATIENT (INPATIENT)
Facility: HOSPITAL | Age: 63
LOS: 4 days | Discharge: HOME | End: 2023-07-18
Attending: INTERNAL MEDICINE | Admitting: INTERNAL MEDICINE
Payer: COMMERCIAL

## 2023-01-01 VITALS
RESPIRATION RATE: 17 BRPM | OXYGEN SATURATION: 100 % | TEMPERATURE: 98 F | DIASTOLIC BLOOD PRESSURE: 69 MMHG | SYSTOLIC BLOOD PRESSURE: 129 MMHG | HEART RATE: 79 BPM

## 2023-01-01 VITALS
RESPIRATION RATE: 16 BRPM | SYSTOLIC BLOOD PRESSURE: 145 MMHG | HEART RATE: 90 BPM | DIASTOLIC BLOOD PRESSURE: 77 MMHG | OXYGEN SATURATION: 99 % | HEIGHT: 74 IN

## 2023-01-01 VITALS
SYSTOLIC BLOOD PRESSURE: 154 MMHG | RESPIRATION RATE: 16 BRPM | DIASTOLIC BLOOD PRESSURE: 77 MMHG | OXYGEN SATURATION: 100 % | HEIGHT: 73 IN | TEMPERATURE: 99 F | HEART RATE: 72 BPM

## 2023-01-01 VITALS
HEART RATE: 100 BPM | RESPIRATION RATE: 18 BRPM | DIASTOLIC BLOOD PRESSURE: 77 MMHG | SYSTOLIC BLOOD PRESSURE: 119 MMHG | OXYGEN SATURATION: 98 % | TEMPERATURE: 98 F

## 2023-01-01 VITALS
TEMPERATURE: 98 F | OXYGEN SATURATION: 99 % | DIASTOLIC BLOOD PRESSURE: 73 MMHG | SYSTOLIC BLOOD PRESSURE: 164 MMHG | HEART RATE: 70 BPM | RESPIRATION RATE: 18 BRPM

## 2023-01-01 VITALS
TEMPERATURE: 97.5 F | RESPIRATION RATE: 16 BRPM | HEART RATE: 78 BPM | WEIGHT: 107 LBS | OXYGEN SATURATION: 99 % | BODY MASS INDEX: 13.73 KG/M2 | HEIGHT: 74 IN | DIASTOLIC BLOOD PRESSURE: 78 MMHG | SYSTOLIC BLOOD PRESSURE: 148 MMHG

## 2023-01-01 VITALS
DIASTOLIC BLOOD PRESSURE: 62 MMHG | OXYGEN SATURATION: 98 % | TEMPERATURE: 98 F | SYSTOLIC BLOOD PRESSURE: 110 MMHG | HEART RATE: 68 BPM | RESPIRATION RATE: 17 BRPM

## 2023-01-01 VITALS
HEART RATE: 81 BPM | DIASTOLIC BLOOD PRESSURE: 65 MMHG | BODY MASS INDEX: 13.86 KG/M2 | SYSTOLIC BLOOD PRESSURE: 110 MMHG | OXYGEN SATURATION: 99 % | HEIGHT: 74 IN | WEIGHT: 108 LBS

## 2023-01-01 VITALS
HEIGHT: 74 IN | WEIGHT: 115 LBS | BODY MASS INDEX: 14.76 KG/M2 | SYSTOLIC BLOOD PRESSURE: 108 MMHG | HEART RATE: 85 BPM | DIASTOLIC BLOOD PRESSURE: 60 MMHG | OXYGEN SATURATION: 97 %

## 2023-01-01 VITALS — HEIGHT: 73 IN | WEIGHT: 184.97 LBS | TEMPERATURE: 91 F

## 2023-01-01 VITALS — BODY MASS INDEX: 16.3 KG/M2 | WEIGHT: 127 LBS | HEIGHT: 74 IN

## 2023-01-01 VITALS
OXYGEN SATURATION: 97 % | DIASTOLIC BLOOD PRESSURE: 73 MMHG | WEIGHT: 110.01 LBS | HEART RATE: 73 BPM | HEIGHT: 73 IN | SYSTOLIC BLOOD PRESSURE: 145 MMHG | TEMPERATURE: 98 F | RESPIRATION RATE: 18 BRPM

## 2023-01-01 VITALS
HEIGHT: 74 IN | DIASTOLIC BLOOD PRESSURE: 60 MMHG | BODY MASS INDEX: 14.25 KG/M2 | HEART RATE: 98 BPM | OXYGEN SATURATION: 98 % | WEIGHT: 111 LBS | SYSTOLIC BLOOD PRESSURE: 98 MMHG

## 2023-01-01 DIAGNOSIS — Z66 DO NOT RESUSCITATE: ICD-10-CM

## 2023-01-01 DIAGNOSIS — K85.90 ACUTE PANCREATITIS WITHOUT NECROSIS OR INFECTION, UNSPECIFIED: ICD-10-CM

## 2023-01-01 DIAGNOSIS — M54.16 RADICULOPATHY, LUMBAR REGION: ICD-10-CM

## 2023-01-01 DIAGNOSIS — G93.9 DISORDER OF BRAIN, UNSPECIFIED: ICD-10-CM

## 2023-01-01 DIAGNOSIS — L60.2 ONYCHOGRYPHOSIS: ICD-10-CM

## 2023-01-01 DIAGNOSIS — I82.721 CHRONIC EMBOLISM AND THROMBOSIS OF DEEP VEINS OF RIGHT UPPER EXTREMITY: ICD-10-CM

## 2023-01-01 DIAGNOSIS — K25.5 CHRONIC OR UNSPECIFIED GASTRIC ULCER WITH PERFORATION: Chronic | ICD-10-CM

## 2023-01-01 DIAGNOSIS — Z09 ENCOUNTER FOR FOLLOW-UP EXAMINATION AFTER COMPLETED TREATMENT FOR CONDITIONS OTHER THAN MALIGNANT NEOPLASM: ICD-10-CM

## 2023-01-01 DIAGNOSIS — R94.31 ABNORMAL ELECTROCARDIOGRAM [ECG] [EKG]: ICD-10-CM

## 2023-01-01 DIAGNOSIS — Z79.891 LONG TERM (CURRENT) USE OF OPIATE ANALGESIC: ICD-10-CM

## 2023-01-01 DIAGNOSIS — R10.9 UNSPECIFIED ABDOMINAL PAIN: ICD-10-CM

## 2023-01-01 DIAGNOSIS — N18.6 END STAGE RENAL DISEASE: ICD-10-CM

## 2023-01-01 DIAGNOSIS — Z86.718 PERSONAL HISTORY OF OTHER VENOUS THROMBOSIS AND EMBOLISM: ICD-10-CM

## 2023-01-01 DIAGNOSIS — F29 UNSPECIFIED PSYCHOSIS NOT DUE TO A SUBSTANCE OR KNOWN PHYSIOLOGICAL CONDITION: ICD-10-CM

## 2023-01-01 DIAGNOSIS — Z79.01 LONG TERM (CURRENT) USE OF ANTICOAGULANTS: ICD-10-CM

## 2023-01-01 DIAGNOSIS — Z99.2 DEPENDENCE ON RENAL DIALYSIS: ICD-10-CM

## 2023-01-01 DIAGNOSIS — E43 UNSPECIFIED SEVERE PROTEIN-CALORIE MALNUTRITION: ICD-10-CM

## 2023-01-01 DIAGNOSIS — Z74.1 NEED FOR ASSISTANCE WITH PERSONAL CARE: ICD-10-CM

## 2023-01-01 DIAGNOSIS — Z99.2 END STAGE RENAL DISEASE: ICD-10-CM

## 2023-01-01 DIAGNOSIS — R63.6 UNDERWEIGHT: ICD-10-CM

## 2023-01-01 DIAGNOSIS — I50.22 CHRONIC SYSTOLIC (CONGESTIVE) HEART FAILURE: ICD-10-CM

## 2023-01-01 DIAGNOSIS — R10.13 EPIGASTRIC PAIN: ICD-10-CM

## 2023-01-01 DIAGNOSIS — Z79.899 OTHER LONG TERM (CURRENT) DRUG THERAPY: ICD-10-CM

## 2023-01-01 DIAGNOSIS — F11.90 OPIOID USE, UNSPECIFIED, UNCOMPLICATED: ICD-10-CM

## 2023-01-01 DIAGNOSIS — Z29.9 ENCOUNTER FOR PROPHYLACTIC MEASURES, UNSPECIFIED: ICD-10-CM

## 2023-01-01 DIAGNOSIS — I50.20 UNSPECIFIED SYSTOLIC (CONGESTIVE) HEART FAILURE: ICD-10-CM

## 2023-01-01 DIAGNOSIS — K86.1 OTHER CHRONIC PANCREATITIS: ICD-10-CM

## 2023-01-01 DIAGNOSIS — F22 DELUSIONAL DISORDERS: ICD-10-CM

## 2023-01-01 DIAGNOSIS — M21.611 BUNION OF RIGHT FOOT: ICD-10-CM

## 2023-01-01 DIAGNOSIS — I82.409 ACUTE EMBOLISM AND THROMBOSIS OF UNSPECIFIED DEEP VEINS OF UNSPECIFIED LOWER EXTREMITY: ICD-10-CM

## 2023-01-01 DIAGNOSIS — I25.10 ATHEROSCLEROTIC HEART DISEASE OF NATIVE CORONARY ARTERY W/OUT ANGINA PECTORIS: ICD-10-CM

## 2023-01-01 DIAGNOSIS — I73.9 PERIPHERAL VASCULAR DISEASE, UNSPECIFIED: ICD-10-CM

## 2023-01-01 DIAGNOSIS — I12.0 HYPERTENSIVE CHRONIC KIDNEY DISEASE WITH STAGE 5 CHRONIC KIDNEY DISEASE OR END STAGE RENAL DISEASE: ICD-10-CM

## 2023-01-01 DIAGNOSIS — G89.4 CHRONIC PAIN SYNDROME: ICD-10-CM

## 2023-01-01 DIAGNOSIS — I82.622 ACUTE EMBOLISM AND THROMBOSIS OF DEEP VEINS OF LEFT UPPER EXTREMITY: ICD-10-CM

## 2023-01-01 DIAGNOSIS — R09.89 OTHER SPECIFIED SYMPTOMS AND SIGNS INVOLVING THE CIRCULATORY AND RESPIRATORY SYSTEMS: ICD-10-CM

## 2023-01-01 DIAGNOSIS — L89.322 PRESSURE ULCER OF LEFT BUTTOCK, STAGE 2: ICD-10-CM

## 2023-01-01 DIAGNOSIS — R64 CACHEXIA: ICD-10-CM

## 2023-01-01 DIAGNOSIS — Z87.891 PERSONAL HISTORY OF NICOTINE DEPENDENCE: ICD-10-CM

## 2023-01-01 DIAGNOSIS — M54.9 DORSALGIA, UNSPECIFIED: ICD-10-CM

## 2023-01-01 DIAGNOSIS — M79.18 MYALGIA, OTHER SITE: ICD-10-CM

## 2023-01-01 DIAGNOSIS — D64.9 ANEMIA, UNSPECIFIED: ICD-10-CM

## 2023-01-01 DIAGNOSIS — I77.0 ARTERIOVENOUS FISTULA, ACQUIRED: Chronic | ICD-10-CM

## 2023-01-01 DIAGNOSIS — L03.116 CELLULITIS OF LEFT LOWER LIMB: ICD-10-CM

## 2023-01-01 DIAGNOSIS — E83.39 OTHER DISORDERS OF PHOSPHORUS METABOLISM: ICD-10-CM

## 2023-01-01 DIAGNOSIS — N18.9 CHRONIC KIDNEY DISEASE, UNSPECIFIED: ICD-10-CM

## 2023-01-01 DIAGNOSIS — M79.2 NEURALGIA AND NEURITIS, UNSPECIFIED: ICD-10-CM

## 2023-01-01 DIAGNOSIS — D75.89 OTHER SPECIFIED DISEASES OF BLOOD AND BLOOD-FORMING ORGANS: ICD-10-CM

## 2023-01-01 DIAGNOSIS — Z86.19 PERSONAL HISTORY OF OTHER INFECTIOUS AND PARASITIC DISEASES: ICD-10-CM

## 2023-01-01 DIAGNOSIS — Z88.6 ALLERGY STATUS TO ANALGESIC AGENT: ICD-10-CM

## 2023-01-01 DIAGNOSIS — I82.621 ACUTE EMBOLISM AND THROMBOSIS OF DEEP VEINS OF RIGHT UPPER EXTREMITY: ICD-10-CM

## 2023-01-01 DIAGNOSIS — G89.29 OTHER CHRONIC PAIN: ICD-10-CM

## 2023-01-01 DIAGNOSIS — K29.00 ACUTE GASTRITIS W/OUT BLEEDING: ICD-10-CM

## 2023-01-01 DIAGNOSIS — S91.301A UNSPECIFIED OPEN WOUND, RIGHT FOOT, INITIAL ENCOUNTER: ICD-10-CM

## 2023-01-01 DIAGNOSIS — I10 ESSENTIAL (PRIMARY) HYPERTENSION: ICD-10-CM

## 2023-01-01 LAB
-  AMIKACIN: SIGNIFICANT CHANGE UP
-  AMPICILLIN/SULBACTAM: SIGNIFICANT CHANGE UP
-  AMPICILLIN: SIGNIFICANT CHANGE UP
-  AZTREONAM: SIGNIFICANT CHANGE UP
-  CEFAZOLIN: SIGNIFICANT CHANGE UP
-  CEFEPIME: SIGNIFICANT CHANGE UP
-  CEFOXITIN: SIGNIFICANT CHANGE UP
-  CEFTRIAXONE: SIGNIFICANT CHANGE UP
-  CIPROFLOXACIN: SIGNIFICANT CHANGE UP
-  ERTAPENEM: SIGNIFICANT CHANGE UP
-  GENTAMICIN: SIGNIFICANT CHANGE UP
-  IMIPENEM: SIGNIFICANT CHANGE UP
-  LEVOFLOXACIN: SIGNIFICANT CHANGE UP
-  MEROPENEM: SIGNIFICANT CHANGE UP
-  PIPERACILLIN/TAZOBACTAM: SIGNIFICANT CHANGE UP
-  TOBRAMYCIN: SIGNIFICANT CHANGE UP
-  TRIMETHOPRIM/SULFAMETHOXAZOLE: SIGNIFICANT CHANGE UP
24R-OH-CALCIDIOL SERPL-MCNC: 10.4 NG/ML — LOW (ref 30–80)
A-TOCOPHEROL VIT E SERPL-MCNC: 6.2 MG/L — LOW (ref 9–29)
A-TOCOPHEROL VIT E SERPL-MCNC: 6.4 MG/L — LOW (ref 9–29)
A1C WITH ESTIMATED AVERAGE GLUCOSE RESULT: 4.1 % — SIGNIFICANT CHANGE UP (ref 4–5.6)
ACANTHOCYTES BLD QL SMEAR: SIGNIFICANT CHANGE UP
ALBUMIN SERPL ELPH-MCNC: 1.5 G/DL — LOW (ref 3.3–5)
ALBUMIN SERPL ELPH-MCNC: 1.6 G/DL — LOW (ref 3.3–5)
ALBUMIN SERPL ELPH-MCNC: 1.6 G/DL — LOW (ref 3.3–5)
ALBUMIN SERPL ELPH-MCNC: 1.8 G/DL — LOW (ref 3.3–5)
ALBUMIN SERPL ELPH-MCNC: 1.9 G/DL — LOW (ref 3.3–5)
ALBUMIN SERPL ELPH-MCNC: 1.9 G/DL — LOW (ref 3.3–5)
ALBUMIN SERPL ELPH-MCNC: 2 G/DL — LOW (ref 3.3–5)
ALBUMIN SERPL ELPH-MCNC: 2.1 G/DL — LOW (ref 3.3–5)
ALBUMIN SERPL ELPH-MCNC: 2.2 G/DL — LOW (ref 3.3–5)
ALBUMIN SERPL ELPH-MCNC: 2.3 G/DL — LOW (ref 3.3–5)
ALBUMIN SERPL ELPH-MCNC: 2.4 G/DL — LOW (ref 3.3–5)
ALBUMIN SERPL ELPH-MCNC: 2.4 G/DL — LOW (ref 3.3–5)
ALBUMIN SERPL ELPH-MCNC: 2.5 G/DL — LOW (ref 3.3–5)
ALBUMIN SERPL ELPH-MCNC: 2.6 G/DL — LOW (ref 3.3–5)
ALBUMIN SERPL ELPH-MCNC: 2.7 G/DL — LOW (ref 3.3–5)
ALBUMIN SERPL ELPH-MCNC: 2.7 G/DL — LOW (ref 3.3–5)
ALBUMIN SERPL ELPH-MCNC: 2.8 G/DL — LOW (ref 3.3–5)
ALBUMIN SERPL ELPH-MCNC: 2.8 G/DL — LOW (ref 3.3–5)
ALBUMIN SERPL ELPH-MCNC: 2.9 G/DL — LOW (ref 3.3–5)
ALBUMIN SERPL ELPH-MCNC: 3.2 G/DL — LOW (ref 3.3–5)
ALBUMIN SERPL ELPH-MCNC: 3.2 G/DL — LOW (ref 3.3–5)
ALP SERPL-CCNC: 115 U/L — SIGNIFICANT CHANGE UP (ref 40–120)
ALP SERPL-CCNC: 116 U/L — SIGNIFICANT CHANGE UP (ref 40–120)
ALP SERPL-CCNC: 225 U/L — HIGH (ref 40–120)
ALP SERPL-CCNC: 227 U/L — HIGH (ref 40–120)
ALP SERPL-CCNC: 229 U/L — HIGH (ref 40–120)
ALP SERPL-CCNC: 229 U/L — HIGH (ref 40–120)
ALP SERPL-CCNC: 233 U/L — HIGH (ref 40–120)
ALP SERPL-CCNC: 234 U/L — HIGH (ref 40–120)
ALP SERPL-CCNC: 234 U/L — HIGH (ref 40–120)
ALP SERPL-CCNC: 247 U/L — HIGH (ref 40–120)
ALP SERPL-CCNC: 260 U/L — HIGH (ref 40–120)
ALP SERPL-CCNC: 268 U/L — HIGH (ref 40–120)
ALP SERPL-CCNC: 271 U/L — HIGH (ref 40–120)
ALP SERPL-CCNC: 280 U/L — HIGH (ref 40–120)
ALP SERPL-CCNC: 281 U/L — HIGH (ref 40–120)
ALP SERPL-CCNC: 281 U/L — HIGH (ref 40–120)
ALP SERPL-CCNC: 286 U/L — HIGH (ref 40–120)
ALP SERPL-CCNC: 302 U/L — HIGH (ref 40–120)
ALP SERPL-CCNC: 310 U/L — HIGH (ref 40–120)
ALP SERPL-CCNC: 333 U/L — HIGH (ref 40–120)
ALP SERPL-CCNC: 352 U/L — HIGH (ref 40–120)
ALP SERPL-CCNC: 390 U/L — HIGH (ref 40–120)
ALP SERPL-CCNC: 393 U/L — HIGH (ref 40–120)
ALP SERPL-CCNC: 478 U/L — HIGH (ref 40–120)
ALP SERPL-CCNC: 527 U/L — HIGH (ref 40–120)
ALP SERPL-CCNC: 628 U/L — HIGH (ref 40–120)
ALP SERPL-CCNC: 665 U/L — HIGH (ref 40–120)
ALP SERPL-CCNC: 702 U/L — HIGH (ref 40–120)
ALP SERPL-CCNC: 863 U/L — HIGH (ref 40–120)
ALT FLD-CCNC: 100 U/L — HIGH (ref 12–78)
ALT FLD-CCNC: 102 U/L — HIGH (ref 12–78)
ALT FLD-CCNC: 120 U/L — HIGH (ref 12–78)
ALT FLD-CCNC: 126 U/L — HIGH (ref 12–78)
ALT FLD-CCNC: 144 U/L — HIGH (ref 4–41)
ALT FLD-CCNC: 163 U/L — HIGH (ref 4–41)
ALT FLD-CCNC: 186 U/L — HIGH (ref 12–78)
ALT FLD-CCNC: 20 U/L — SIGNIFICANT CHANGE UP (ref 4–41)
ALT FLD-CCNC: 21 U/L — SIGNIFICANT CHANGE UP (ref 4–41)
ALT FLD-CCNC: 233 U/L — HIGH (ref 12–78)
ALT FLD-CCNC: 384 U/L — HIGH (ref 12–78)
ALT FLD-CCNC: 446 U/L — HIGH (ref 12–78)
ALT FLD-CCNC: 486 U/L — HIGH (ref 12–78)
ALT FLD-CCNC: 533 U/L — HIGH (ref 12–78)
ALT FLD-CCNC: 534 U/L — HIGH (ref 12–78)
ALT FLD-CCNC: 57 U/L — HIGH (ref 4–41)
ALT FLD-CCNC: 62 U/L — HIGH (ref 4–41)
ALT FLD-CCNC: 63 U/L — HIGH (ref 4–41)
ALT FLD-CCNC: 68 U/L — HIGH (ref 4–41)
ALT FLD-CCNC: 718 U/L — HIGH (ref 12–78)
ALT FLD-CCNC: 72 U/L — HIGH (ref 4–41)
ALT FLD-CCNC: 73 U/L — HIGH (ref 4–41)
ALT FLD-CCNC: 73 U/L — HIGH (ref 4–41)
ALT FLD-CCNC: 75 U/L — HIGH (ref 4–41)
ALT FLD-CCNC: 75 U/L — HIGH (ref 4–41)
ALT FLD-CCNC: 77 U/L — HIGH (ref 4–41)
ALT FLD-CCNC: 79 U/L — HIGH (ref 12–78)
ALT FLD-CCNC: 84 U/L — HIGH (ref 12–78)
ALT FLD-CCNC: 90 U/L — HIGH (ref 4–41)
ALT FLD-CCNC: 93 U/L — HIGH (ref 4–41)
ALT FLD-CCNC: 95 U/L — HIGH (ref 4–41)
AMMONIA BLD-MCNC: 31 UMOL/L — SIGNIFICANT CHANGE UP (ref 11–32)
AMPHET UR-MCNC: NEGATIVE — SIGNIFICANT CHANGE UP
ANION GAP SERPL CALC-SCNC: 10 MMOL/L — SIGNIFICANT CHANGE UP (ref 5–17)
ANION GAP SERPL CALC-SCNC: 10 MMOL/L — SIGNIFICANT CHANGE UP (ref 7–14)
ANION GAP SERPL CALC-SCNC: 11 MMOL/L — SIGNIFICANT CHANGE UP (ref 5–17)
ANION GAP SERPL CALC-SCNC: 11 MMOL/L — SIGNIFICANT CHANGE UP (ref 5–17)
ANION GAP SERPL CALC-SCNC: 11 MMOL/L — SIGNIFICANT CHANGE UP (ref 7–14)
ANION GAP SERPL CALC-SCNC: 12 MMOL/L — SIGNIFICANT CHANGE UP (ref 7–14)
ANION GAP SERPL CALC-SCNC: 13 MMOL/L — SIGNIFICANT CHANGE UP (ref 7–14)
ANION GAP SERPL CALC-SCNC: 14 MMOL/L — SIGNIFICANT CHANGE UP (ref 7–14)
ANION GAP SERPL CALC-SCNC: 15 MMOL/L — HIGH (ref 7–14)
ANION GAP SERPL CALC-SCNC: 16 MMOL/L — HIGH (ref 7–14)
ANION GAP SERPL CALC-SCNC: 17 MMOL/L — HIGH (ref 7–14)
ANION GAP SERPL CALC-SCNC: 18 MMOL/L — HIGH (ref 7–14)
ANION GAP SERPL CALC-SCNC: 18 MMOL/L — HIGH (ref 7–14)
ANION GAP SERPL CALC-SCNC: 6 MMOL/L — SIGNIFICANT CHANGE UP (ref 5–17)
ANION GAP SERPL CALC-SCNC: 6 MMOL/L — SIGNIFICANT CHANGE UP (ref 5–17)
ANION GAP SERPL CALC-SCNC: 7 MMOL/L — SIGNIFICANT CHANGE UP (ref 5–17)
ANION GAP SERPL CALC-SCNC: 7 MMOL/L — SIGNIFICANT CHANGE UP (ref 5–17)
ANION GAP SERPL CALC-SCNC: 7 MMOL/L — SIGNIFICANT CHANGE UP (ref 7–14)
ANION GAP SERPL CALC-SCNC: 8 MMOL/L — SIGNIFICANT CHANGE UP (ref 5–17)
ANION GAP SERPL CALC-SCNC: 9 MMOL/L — SIGNIFICANT CHANGE UP (ref 5–17)
ANION GAP SERPL CALC-SCNC: 9 MMOL/L — SIGNIFICANT CHANGE UP (ref 7–14)
ANISOCYTOSIS BLD QL: SIGNIFICANT CHANGE UP
ANISOCYTOSIS BLD QL: SLIGHT — SIGNIFICANT CHANGE UP
ANISOCYTOSIS BLD QL: SLIGHT — SIGNIFICANT CHANGE UP
APAP SERPL-MCNC: <10 UG/ML — LOW (ref 15–25)
APPEARANCE UR: CLEAR — SIGNIFICANT CHANGE UP
APPEARANCE UR: CLEAR — SIGNIFICANT CHANGE UP
APTT BLD: 103.3 SEC — HIGH (ref 27–36.3)
APTT BLD: 103.3 SEC — SIGNIFICANT CHANGE UP (ref 27–36.3)
APTT BLD: 22.5 SEC — LOW (ref 27–36.3)
APTT BLD: 23.4 SEC — LOW (ref 27–36.3)
APTT BLD: 24.5 SEC — LOW (ref 27–36.3)
APTT BLD: 24.5 SEC — SIGNIFICANT CHANGE UP (ref 24.5–35.6)
APTT BLD: 25.5 SEC — LOW (ref 27–36.3)
APTT BLD: 27.5 SEC — SIGNIFICANT CHANGE UP (ref 27–36.3)
APTT BLD: 27.6 SEC — SIGNIFICANT CHANGE UP (ref 24.5–35.6)
APTT BLD: 28.4 SEC — SIGNIFICANT CHANGE UP (ref 27–36.3)
APTT BLD: 29.2 SEC — SIGNIFICANT CHANGE UP (ref 27–36.3)
APTT BLD: 29.7 SEC — SIGNIFICANT CHANGE UP (ref 27–36.3)
APTT BLD: 29.7 SEC — SIGNIFICANT CHANGE UP (ref 27–36.3)
APTT BLD: 29.8 SEC — SIGNIFICANT CHANGE UP (ref 27–36.3)
APTT BLD: 29.8 SEC — SIGNIFICANT CHANGE UP (ref 27–36.3)
APTT BLD: 29.9 SEC — SIGNIFICANT CHANGE UP (ref 24.5–35.6)
APTT BLD: 30 SEC — SIGNIFICANT CHANGE UP (ref 27–36.3)
APTT BLD: 30.6 SEC — SIGNIFICANT CHANGE UP (ref 27–36.3)
APTT BLD: 30.6 SEC — SIGNIFICANT CHANGE UP (ref 27–36.3)
APTT BLD: 31.1 SEC — SIGNIFICANT CHANGE UP (ref 24.5–35.6)
APTT BLD: 31.9 SEC — SIGNIFICANT CHANGE UP (ref 27–36.3)
APTT BLD: 32 SEC — SIGNIFICANT CHANGE UP (ref 27–36.3)
APTT BLD: 33 SEC — SIGNIFICANT CHANGE UP (ref 27–36.3)
APTT BLD: 33.2 SEC — SIGNIFICANT CHANGE UP (ref 27–36.3)
APTT BLD: 33.3 SEC — SIGNIFICANT CHANGE UP (ref 27–36.3)
APTT BLD: 35.4 SEC — SIGNIFICANT CHANGE UP (ref 27–36.3)
APTT BLD: 36.2 SEC — SIGNIFICANT CHANGE UP (ref 27–36.3)
APTT BLD: 38.1 SEC — HIGH (ref 27–36.3)
APTT BLD: 40.3 SEC — HIGH (ref 27–36.3)
APTT BLD: 44.9 SEC — HIGH (ref 27–36.3)
APTT BLD: 52.2 SEC — HIGH (ref 27–36.3)
APTT BLD: 59.8 SEC — HIGH (ref 27–36.3)
APTT BLD: 61.2 SEC — HIGH (ref 27–36.3)
APTT BLD: 62.7 SEC — HIGH (ref 27–36.3)
APTT BLD: 63.3 SEC — HIGH (ref 27–36.3)
APTT BLD: 65.8 SEC — HIGH (ref 27–36.3)
APTT BLD: 77.9 SEC — HIGH (ref 27–36.3)
APTT BLD: 94.3 SEC — HIGH (ref 27–36.3)
APTT BLD: 94.4 SEC — HIGH (ref 27–36.3)
AST SERPL-CCNC: 115 U/L — HIGH (ref 4–40)
AST SERPL-CCNC: 144 U/L — HIGH (ref 4–40)
AST SERPL-CCNC: 146 U/L — HIGH (ref 15–37)
AST SERPL-CCNC: 18 U/L — SIGNIFICANT CHANGE UP (ref 4–40)
AST SERPL-CCNC: 20 U/L — SIGNIFICANT CHANGE UP (ref 4–40)
AST SERPL-CCNC: 21 U/L — SIGNIFICANT CHANGE UP (ref 15–37)
AST SERPL-CCNC: 230 U/L — HIGH (ref 15–37)
AST SERPL-CCNC: 25 U/L — SIGNIFICANT CHANGE UP (ref 4–40)
AST SERPL-CCNC: 298 U/L — HIGH (ref 15–37)
AST SERPL-CCNC: 30 U/L — SIGNIFICANT CHANGE UP (ref 4–40)
AST SERPL-CCNC: 34 U/L — SIGNIFICANT CHANGE UP (ref 4–40)
AST SERPL-CCNC: 34 U/L — SIGNIFICANT CHANGE UP (ref 4–40)
AST SERPL-CCNC: 35 U/L — SIGNIFICANT CHANGE UP (ref 4–40)
AST SERPL-CCNC: 40 U/L — HIGH (ref 15–37)
AST SERPL-CCNC: 42 U/L — HIGH (ref 4–40)
AST SERPL-CCNC: 438 U/L — HIGH (ref 15–37)
AST SERPL-CCNC: 44 U/L — HIGH (ref 4–40)
AST SERPL-CCNC: 44 U/L — HIGH (ref 4–40)
AST SERPL-CCNC: 45 U/L — HIGH (ref 4–40)
AST SERPL-CCNC: 45 U/L — HIGH (ref 4–40)
AST SERPL-CCNC: 47 U/L — HIGH (ref 15–37)
AST SERPL-CCNC: 51 U/L — HIGH (ref 15–37)
AST SERPL-CCNC: 51 U/L — HIGH (ref 15–37)
AST SERPL-CCNC: 53 U/L — HIGH (ref 4–40)
AST SERPL-CCNC: 58 U/L — HIGH (ref 15–37)
AST SERPL-CCNC: 63 U/L — HIGH (ref 15–37)
AST SERPL-CCNC: 64 U/L — HIGH (ref 4–40)
AST SERPL-CCNC: 732 U/L — HIGH (ref 15–37)
AST SERPL-CCNC: 76 U/L — HIGH (ref 4–40)
AST SERPL-CCNC: 83 U/L — HIGH (ref 15–37)
AST SERPL-CCNC: 932 U/L — HIGH (ref 15–37)
B PERT DNA SPEC QL NAA+PROBE: SIGNIFICANT CHANGE UP
B PERT DNA SPEC QL NAA+PROBE: SIGNIFICANT CHANGE UP
B PERT+PARAPERT DNA PNL SPEC NAA+PROBE: SIGNIFICANT CHANGE UP
B PERT+PARAPERT DNA PNL SPEC NAA+PROBE: SIGNIFICANT CHANGE UP
BACTERIA # UR AUTO: ABNORMAL
BARBITURATES UR SCN-MCNC: NEGATIVE — SIGNIFICANT CHANGE UP
BASE EXCESS BLDA CALC-SCNC: -2.4 MMOL/L — LOW (ref -2–3)
BASE EXCESS BLDA CALC-SCNC: 2.1 MMOL/L — SIGNIFICANT CHANGE UP (ref -2–3)
BASE EXCESS BLDV CALC-SCNC: 4.7 MMOL/L — HIGH (ref -2–3)
BASOPHILS # BLD AUTO: 0 K/UL — SIGNIFICANT CHANGE UP (ref 0–0.2)
BASOPHILS # BLD AUTO: 0.02 K/UL — SIGNIFICANT CHANGE UP (ref 0–0.2)
BASOPHILS # BLD AUTO: 0.03 K/UL — SIGNIFICANT CHANGE UP (ref 0–0.2)
BASOPHILS # BLD AUTO: 0.04 K/UL — SIGNIFICANT CHANGE UP (ref 0–0.2)
BASOPHILS # BLD AUTO: 0.04 K/UL — SIGNIFICANT CHANGE UP (ref 0–0.2)
BASOPHILS # BLD AUTO: 0.05 K/UL — SIGNIFICANT CHANGE UP (ref 0–0.2)
BASOPHILS # BLD AUTO: 0.06 K/UL — SIGNIFICANT CHANGE UP (ref 0–0.2)
BASOPHILS # BLD AUTO: 0.07 K/UL — SIGNIFICANT CHANGE UP (ref 0–0.2)
BASOPHILS # BLD AUTO: 0.1 K/UL — SIGNIFICANT CHANGE UP (ref 0–0.2)
BASOPHILS NFR BLD AUTO: 0 % — SIGNIFICANT CHANGE UP (ref 0–2)
BASOPHILS NFR BLD AUTO: 0.2 % — SIGNIFICANT CHANGE UP (ref 0–2)
BASOPHILS NFR BLD AUTO: 0.3 % — SIGNIFICANT CHANGE UP (ref 0–2)
BASOPHILS NFR BLD AUTO: 0.3 % — SIGNIFICANT CHANGE UP (ref 0–2)
BASOPHILS NFR BLD AUTO: 0.4 % — SIGNIFICANT CHANGE UP (ref 0–2)
BASOPHILS NFR BLD AUTO: 0.5 % — SIGNIFICANT CHANGE UP (ref 0–2)
BASOPHILS NFR BLD AUTO: 0.6 % — SIGNIFICANT CHANGE UP (ref 0–2)
BASOPHILS NFR BLD AUTO: 0.7 % — SIGNIFICANT CHANGE UP (ref 0–2)
BASOPHILS NFR BLD AUTO: 0.8 % — SIGNIFICANT CHANGE UP (ref 0–2)
BASOPHILS NFR BLD AUTO: 0.8 % — SIGNIFICANT CHANGE UP (ref 0–2)
BASOPHILS NFR BLD AUTO: 0.9 % — SIGNIFICANT CHANGE UP (ref 0–2)
BASOPHILS NFR BLD AUTO: 1.2 % — SIGNIFICANT CHANGE UP (ref 0–2)
BENZODIAZ UR-MCNC: POSITIVE — SIGNIFICANT CHANGE UP
BETA+GAMMA TOCOPHEROL SERPL-MCNC: 0.6 MG/L — SIGNIFICANT CHANGE UP (ref 0.5–4.9)
BETA+GAMMA TOCOPHEROL SERPL-MCNC: 0.9 MG/L — SIGNIFICANT CHANGE UP (ref 0.5–4.9)
BILIRUB SERPL-MCNC: 0.2 MG/DL — SIGNIFICANT CHANGE UP (ref 0.2–1.2)
BILIRUB SERPL-MCNC: 0.3 MG/DL — SIGNIFICANT CHANGE UP (ref 0.2–1.2)
BILIRUB SERPL-MCNC: 0.4 MG/DL — SIGNIFICANT CHANGE UP (ref 0.2–1.2)
BILIRUB SERPL-MCNC: 0.5 MG/DL — SIGNIFICANT CHANGE UP (ref 0.2–1.2)
BILIRUB SERPL-MCNC: 0.6 MG/DL — SIGNIFICANT CHANGE UP (ref 0.2–1.2)
BILIRUB SERPL-MCNC: <0.2 MG/DL — SIGNIFICANT CHANGE UP (ref 0.2–1.2)
BILIRUB UR-MCNC: NEGATIVE — SIGNIFICANT CHANGE UP
BILIRUB UR-MCNC: NEGATIVE — SIGNIFICANT CHANGE UP
BLD GP AB SCN SERPL QL: NEGATIVE — SIGNIFICANT CHANGE UP
BLD GP AB SCN SERPL QL: SIGNIFICANT CHANGE UP
BLOOD GAS COMMENTS ARTERIAL: SIGNIFICANT CHANGE UP
BLOOD GAS COMMENTS ARTERIAL: SIGNIFICANT CHANGE UP
BLOOD GAS VENOUS COMPREHENSIVE RESULT: SIGNIFICANT CHANGE UP
BORDETELLA PARAPERTUSSIS (RAPRVP): SIGNIFICANT CHANGE UP
BORDETELLA PARAPERTUSSIS (RAPRVP): SIGNIFICANT CHANGE UP
BUN SERPL-MCNC: 103 MG/DL — HIGH (ref 7–23)
BUN SERPL-MCNC: 104 MG/DL — HIGH (ref 7–23)
BUN SERPL-MCNC: 104 MG/DL — HIGH (ref 7–23)
BUN SERPL-MCNC: 12 MG/DL — SIGNIFICANT CHANGE UP (ref 7–23)
BUN SERPL-MCNC: 15 MG/DL — SIGNIFICANT CHANGE UP (ref 7–23)
BUN SERPL-MCNC: 18 MG/DL — SIGNIFICANT CHANGE UP (ref 7–23)
BUN SERPL-MCNC: 20 MG/DL — SIGNIFICANT CHANGE UP (ref 7–23)
BUN SERPL-MCNC: 20 MG/DL — SIGNIFICANT CHANGE UP (ref 7–23)
BUN SERPL-MCNC: 21 MG/DL — SIGNIFICANT CHANGE UP (ref 7–23)
BUN SERPL-MCNC: 22 MG/DL — SIGNIFICANT CHANGE UP (ref 7–23)
BUN SERPL-MCNC: 25 MG/DL — HIGH (ref 7–23)
BUN SERPL-MCNC: 27 MG/DL — HIGH (ref 7–23)
BUN SERPL-MCNC: 29 MG/DL — HIGH (ref 7–23)
BUN SERPL-MCNC: 30 MG/DL — HIGH (ref 7–23)
BUN SERPL-MCNC: 31 MG/DL — HIGH (ref 7–23)
BUN SERPL-MCNC: 31 MG/DL — HIGH (ref 7–23)
BUN SERPL-MCNC: 32 MG/DL — HIGH (ref 7–23)
BUN SERPL-MCNC: 34 MG/DL — HIGH (ref 7–23)
BUN SERPL-MCNC: 34 MG/DL — HIGH (ref 7–23)
BUN SERPL-MCNC: 35 MG/DL — HIGH (ref 7–23)
BUN SERPL-MCNC: 36 MG/DL — HIGH (ref 7–23)
BUN SERPL-MCNC: 36 MG/DL — HIGH (ref 7–23)
BUN SERPL-MCNC: 37 MG/DL — HIGH (ref 7–23)
BUN SERPL-MCNC: 39 MG/DL — HIGH (ref 7–23)
BUN SERPL-MCNC: 39 MG/DL — HIGH (ref 7–23)
BUN SERPL-MCNC: 41 MG/DL — HIGH (ref 7–23)
BUN SERPL-MCNC: 41 MG/DL — HIGH (ref 7–23)
BUN SERPL-MCNC: 42 MG/DL — HIGH (ref 7–23)
BUN SERPL-MCNC: 44 MG/DL — HIGH (ref 7–23)
BUN SERPL-MCNC: 44 MG/DL — HIGH (ref 7–23)
BUN SERPL-MCNC: 46 MG/DL — HIGH (ref 7–23)
BUN SERPL-MCNC: 47 MG/DL — HIGH (ref 7–23)
BUN SERPL-MCNC: 47 MG/DL — HIGH (ref 7–23)
BUN SERPL-MCNC: 48 MG/DL — HIGH (ref 7–23)
BUN SERPL-MCNC: 48 MG/DL — HIGH (ref 7–23)
BUN SERPL-MCNC: 49 MG/DL — HIGH (ref 7–23)
BUN SERPL-MCNC: 50 MG/DL — HIGH (ref 7–23)
BUN SERPL-MCNC: 50 MG/DL — HIGH (ref 7–23)
BUN SERPL-MCNC: 53 MG/DL — HIGH (ref 7–23)
BUN SERPL-MCNC: 58 MG/DL — HIGH (ref 7–23)
BUN SERPL-MCNC: 59 MG/DL — HIGH (ref 7–23)
BUN SERPL-MCNC: 59 MG/DL — HIGH (ref 7–23)
BUN SERPL-MCNC: 60 MG/DL — HIGH (ref 7–23)
BUN SERPL-MCNC: 62 MG/DL — HIGH (ref 7–23)
BUN SERPL-MCNC: 63 MG/DL — HIGH (ref 7–23)
BUN SERPL-MCNC: 64 MG/DL — HIGH (ref 7–23)
BUN SERPL-MCNC: 64 MG/DL — HIGH (ref 7–23)
BUN SERPL-MCNC: 65 MG/DL — HIGH (ref 7–23)
BUN SERPL-MCNC: 69 MG/DL — HIGH (ref 7–23)
BUN SERPL-MCNC: 71 MG/DL — HIGH (ref 7–23)
BUN SERPL-MCNC: 71 MG/DL — HIGH (ref 7–23)
BUN SERPL-MCNC: 72 MG/DL — HIGH (ref 7–23)
BUN SERPL-MCNC: 73 MG/DL — HIGH (ref 7–23)
BUN SERPL-MCNC: 73 MG/DL — HIGH (ref 7–23)
BUN SERPL-MCNC: 74 MG/DL — HIGH (ref 7–23)
BUN SERPL-MCNC: 74 MG/DL — HIGH (ref 7–23)
BUN SERPL-MCNC: 75 MG/DL — HIGH (ref 7–23)
BUN SERPL-MCNC: 76 MG/DL — HIGH (ref 7–23)
BUN SERPL-MCNC: 78 MG/DL — HIGH (ref 7–23)
BUN SERPL-MCNC: 83 MG/DL — HIGH (ref 7–23)
BUN SERPL-MCNC: 85 MG/DL — HIGH (ref 7–23)
BUN SERPL-MCNC: 85 MG/DL — HIGH (ref 7–23)
BUN SERPL-MCNC: 86 MG/DL — HIGH (ref 7–23)
BUN SERPL-MCNC: 90 MG/DL — HIGH (ref 7–23)
BUN SERPL-MCNC: 93 MG/DL — HIGH (ref 7–23)
BUN SERPL-MCNC: 96 MG/DL — HIGH (ref 7–23)
BUN SERPL-MCNC: 97 MG/DL — HIGH (ref 7–23)
BURR CELLS BLD QL SMEAR: PRESENT — SIGNIFICANT CHANGE UP
BURR CELLS BLD QL SMEAR: SLIGHT — SIGNIFICANT CHANGE UP
C PNEUM DNA SPEC QL NAA+PROBE: SIGNIFICANT CHANGE UP
C PNEUM DNA SPEC QL NAA+PROBE: SIGNIFICANT CHANGE UP
CA-I BLD-SCNC: 1.15 MMOL/L — SIGNIFICANT CHANGE UP (ref 1.15–1.29)
CALCIUM SERPL-MCNC: 6.6 MG/DL — LOW (ref 8.5–10.1)
CALCIUM SERPL-MCNC: 6.6 MG/DL — LOW (ref 8.5–10.1)
CALCIUM SERPL-MCNC: 6.8 MG/DL — LOW (ref 8.5–10.1)
CALCIUM SERPL-MCNC: 6.9 MG/DL — LOW (ref 8.5–10.1)
CALCIUM SERPL-MCNC: 7.1 MG/DL — LOW (ref 8.5–10.1)
CALCIUM SERPL-MCNC: 7.2 MG/DL — LOW (ref 8.5–10.1)
CALCIUM SERPL-MCNC: 7.3 MG/DL — LOW (ref 8.4–10.5)
CALCIUM SERPL-MCNC: 7.3 MG/DL — LOW (ref 8.5–10.1)
CALCIUM SERPL-MCNC: 7.4 MG/DL — LOW (ref 8.4–10.5)
CALCIUM SERPL-MCNC: 7.4 MG/DL — LOW (ref 8.5–10.1)
CALCIUM SERPL-MCNC: 7.5 MG/DL — LOW (ref 8.4–10.5)
CALCIUM SERPL-MCNC: 7.5 MG/DL — LOW (ref 8.5–10.1)
CALCIUM SERPL-MCNC: 7.6 MG/DL — LOW (ref 8.4–10.5)
CALCIUM SERPL-MCNC: 7.7 MG/DL — LOW (ref 8.4–10.5)
CALCIUM SERPL-MCNC: 7.8 MG/DL — LOW (ref 8.4–10.5)
CALCIUM SERPL-MCNC: 7.9 MG/DL — LOW (ref 8.4–10.5)
CALCIUM SERPL-MCNC: 8 MG/DL — LOW (ref 8.4–10.5)
CALCIUM SERPL-MCNC: 8.1 MG/DL — LOW (ref 8.4–10.5)
CALCIUM SERPL-MCNC: 8.2 MG/DL — LOW (ref 8.4–10.5)
CALCIUM SERPL-MCNC: 8.3 MG/DL — LOW (ref 8.4–10.5)
CALCIUM SERPL-MCNC: 8.3 MG/DL — LOW (ref 8.4–10.5)
CALCIUM SERPL-MCNC: 8.5 MG/DL — SIGNIFICANT CHANGE UP (ref 8.4–10.5)
CHLORIDE BLDV-SCNC: 100 MMOL/L — SIGNIFICANT CHANGE UP (ref 96–108)
CHLORIDE SERPL-SCNC: 100 MMOL/L — SIGNIFICANT CHANGE UP (ref 98–107)
CHLORIDE SERPL-SCNC: 101 MMOL/L — SIGNIFICANT CHANGE UP (ref 96–108)
CHLORIDE SERPL-SCNC: 101 MMOL/L — SIGNIFICANT CHANGE UP (ref 98–107)
CHLORIDE SERPL-SCNC: 102 MMOL/L — SIGNIFICANT CHANGE UP (ref 96–108)
CHLORIDE SERPL-SCNC: 102 MMOL/L — SIGNIFICANT CHANGE UP (ref 98–107)
CHLORIDE SERPL-SCNC: 103 MMOL/L — SIGNIFICANT CHANGE UP (ref 96–108)
CHLORIDE SERPL-SCNC: 103 MMOL/L — SIGNIFICANT CHANGE UP (ref 98–107)
CHLORIDE SERPL-SCNC: 104 MMOL/L — SIGNIFICANT CHANGE UP (ref 96–108)
CHLORIDE SERPL-SCNC: 104 MMOL/L — SIGNIFICANT CHANGE UP (ref 96–108)
CHLORIDE SERPL-SCNC: 104 MMOL/L — SIGNIFICANT CHANGE UP (ref 98–107)
CHLORIDE SERPL-SCNC: 104 MMOL/L — SIGNIFICANT CHANGE UP (ref 98–107)
CHLORIDE SERPL-SCNC: 105 MMOL/L — SIGNIFICANT CHANGE UP (ref 96–108)
CHLORIDE SERPL-SCNC: 105 MMOL/L — SIGNIFICANT CHANGE UP (ref 98–107)
CHLORIDE SERPL-SCNC: 106 MMOL/L — SIGNIFICANT CHANGE UP (ref 98–107)
CHLORIDE SERPL-SCNC: 107 MMOL/L — SIGNIFICANT CHANGE UP (ref 96–108)
CHLORIDE SERPL-SCNC: 107 MMOL/L — SIGNIFICANT CHANGE UP (ref 96–108)
CHLORIDE SERPL-SCNC: 107 MMOL/L — SIGNIFICANT CHANGE UP (ref 98–107)
CHLORIDE SERPL-SCNC: 108 MMOL/L — HIGH (ref 98–107)
CHLORIDE SERPL-SCNC: 108 MMOL/L — SIGNIFICANT CHANGE UP (ref 96–108)
CHLORIDE SERPL-SCNC: 109 MMOL/L — HIGH (ref 96–108)
CHLORIDE SERPL-SCNC: 109 MMOL/L — HIGH (ref 96–108)
CHLORIDE SERPL-SCNC: 109 MMOL/L — HIGH (ref 98–107)
CHLORIDE SERPL-SCNC: 110 MMOL/L — HIGH (ref 98–107)
CHLORIDE SERPL-SCNC: 111 MMOL/L — HIGH (ref 96–108)
CHLORIDE SERPL-SCNC: 111 MMOL/L — HIGH (ref 98–107)
CHLORIDE SERPL-SCNC: 111 MMOL/L — HIGH (ref 98–107)
CHLORIDE SERPL-SCNC: 112 MMOL/L — HIGH (ref 96–108)
CHLORIDE SERPL-SCNC: 112 MMOL/L — HIGH (ref 98–107)
CHLORIDE SERPL-SCNC: 113 MMOL/L — HIGH (ref 98–107)
CHLORIDE SERPL-SCNC: 115 MMOL/L — HIGH (ref 98–107)
CHLORIDE SERPL-SCNC: 120 MMOL/L — HIGH (ref 98–107)
CHOLEST SERPL-MCNC: 96 MG/DL — SIGNIFICANT CHANGE UP
CO2 BLDA-SCNC: 27 MMOL/L — HIGH (ref 19–24)
CO2 BLDA-SCNC: 27 MMOL/L — HIGH (ref 19–24)
CO2 BLDV-SCNC: 31.9 MMOL/L — HIGH (ref 22–26)
CO2 SERPL-SCNC: 10 MMOL/L — CRITICAL LOW (ref 22–31)
CO2 SERPL-SCNC: 11 MMOL/L — LOW (ref 22–31)
CO2 SERPL-SCNC: 12 MMOL/L — LOW (ref 22–31)
CO2 SERPL-SCNC: 13 MMOL/L — LOW (ref 22–31)
CO2 SERPL-SCNC: 14 MMOL/L — LOW (ref 22–31)
CO2 SERPL-SCNC: 15 MMOL/L — LOW (ref 22–31)
CO2 SERPL-SCNC: 16 MMOL/L — LOW (ref 22–31)
CO2 SERPL-SCNC: 16 MMOL/L — LOW (ref 22–31)
CO2 SERPL-SCNC: 17 MMOL/L — LOW (ref 22–31)
CO2 SERPL-SCNC: 18 MMOL/L — LOW (ref 22–31)
CO2 SERPL-SCNC: 19 MMOL/L — LOW (ref 22–31)
CO2 SERPL-SCNC: 20 MMOL/L — LOW (ref 22–31)
CO2 SERPL-SCNC: 21 MMOL/L — LOW (ref 22–31)
CO2 SERPL-SCNC: 21 MMOL/L — LOW (ref 22–31)
CO2 SERPL-SCNC: 22 MMOL/L — SIGNIFICANT CHANGE UP (ref 22–31)
CO2 SERPL-SCNC: 23 MMOL/L — SIGNIFICANT CHANGE UP (ref 22–31)
CO2 SERPL-SCNC: 24 MMOL/L — SIGNIFICANT CHANGE UP (ref 22–31)
CO2 SERPL-SCNC: 25 MMOL/L — SIGNIFICANT CHANGE UP (ref 22–31)
CO2 SERPL-SCNC: 26 MMOL/L — SIGNIFICANT CHANGE UP (ref 22–31)
CO2 SERPL-SCNC: 27 MMOL/L — SIGNIFICANT CHANGE UP (ref 22–31)
CO2 SERPL-SCNC: 28 MMOL/L — SIGNIFICANT CHANGE UP (ref 22–31)
CO2 SERPL-SCNC: 28 MMOL/L — SIGNIFICANT CHANGE UP (ref 22–31)
CO2 SERPL-SCNC: 29 MMOL/L — SIGNIFICANT CHANGE UP (ref 22–31)
COCAINE METAB.OTHER UR-MCNC: NEGATIVE — SIGNIFICANT CHANGE UP
COLOR SPEC: YELLOW — SIGNIFICANT CHANGE UP
COLOR SPEC: YELLOW — SIGNIFICANT CHANGE UP
CORTICOSTEROID BINDING GLOBULIN RESULT: 1.3 MG/DL — LOW
CORTIS F/TOTAL MFR SERPL: 62 % — SIGNIFICANT CHANGE UP
CORTIS SERPL-MCNC: 23 UG/DL — HIGH
CORTISOL, FREE RESULT: 14 UG/DL — HIGH
CREAT SERPL-MCNC: 1.75 MG/DL — HIGH (ref 0.5–1.3)
CREAT SERPL-MCNC: 2.18 MG/DL — HIGH (ref 0.5–1.3)
CREAT SERPL-MCNC: 2.26 MG/DL — HIGH (ref 0.5–1.3)
CREAT SERPL-MCNC: 2.27 MG/DL — HIGH (ref 0.5–1.3)
CREAT SERPL-MCNC: 2.29 MG/DL — HIGH (ref 0.5–1.3)
CREAT SERPL-MCNC: 2.62 MG/DL — HIGH (ref 0.5–1.3)
CREAT SERPL-MCNC: 2.69 MG/DL — HIGH (ref 0.5–1.3)
CREAT SERPL-MCNC: 2.72 MG/DL — HIGH (ref 0.5–1.3)
CREAT SERPL-MCNC: 2.83 MG/DL — HIGH (ref 0.5–1.3)
CREAT SERPL-MCNC: 3.04 MG/DL — HIGH (ref 0.5–1.3)
CREAT SERPL-MCNC: 3.06 MG/DL — HIGH (ref 0.5–1.3)
CREAT SERPL-MCNC: 3.1 MG/DL — HIGH (ref 0.5–1.3)
CREAT SERPL-MCNC: 3.22 MG/DL — HIGH (ref 0.5–1.3)
CREAT SERPL-MCNC: 3.27 MG/DL — HIGH (ref 0.5–1.3)
CREAT SERPL-MCNC: 3.51 MG/DL — HIGH (ref 0.5–1.3)
CREAT SERPL-MCNC: 3.53 MG/DL — HIGH (ref 0.5–1.3)
CREAT SERPL-MCNC: 3.7 MG/DL — HIGH (ref 0.5–1.3)
CREAT SERPL-MCNC: 3.72 MG/DL — HIGH (ref 0.5–1.3)
CREAT SERPL-MCNC: 3.73 MG/DL — HIGH (ref 0.5–1.3)
CREAT SERPL-MCNC: 3.73 MG/DL — HIGH (ref 0.5–1.3)
CREAT SERPL-MCNC: 3.78 MG/DL — HIGH (ref 0.5–1.3)
CREAT SERPL-MCNC: 3.81 MG/DL — HIGH (ref 0.5–1.3)
CREAT SERPL-MCNC: 3.82 MG/DL — HIGH (ref 0.5–1.3)
CREAT SERPL-MCNC: 3.82 MG/DL — HIGH (ref 0.5–1.3)
CREAT SERPL-MCNC: 3.86 MG/DL — HIGH (ref 0.5–1.3)
CREAT SERPL-MCNC: 3.92 MG/DL — HIGH (ref 0.5–1.3)
CREAT SERPL-MCNC: 3.99 MG/DL — HIGH (ref 0.5–1.3)
CREAT SERPL-MCNC: 4 MG/DL — HIGH (ref 0.5–1.3)
CREAT SERPL-MCNC: 4.04 MG/DL — HIGH (ref 0.5–1.3)
CREAT SERPL-MCNC: 4.15 MG/DL — HIGH (ref 0.5–1.3)
CREAT SERPL-MCNC: 4.27 MG/DL — HIGH (ref 0.5–1.3)
CREAT SERPL-MCNC: 4.27 MG/DL — HIGH (ref 0.5–1.3)
CREAT SERPL-MCNC: 4.39 MG/DL — HIGH (ref 0.5–1.3)
CREAT SERPL-MCNC: 4.42 MG/DL — HIGH (ref 0.5–1.3)
CREAT SERPL-MCNC: 4.47 MG/DL — HIGH (ref 0.5–1.3)
CREAT SERPL-MCNC: 4.51 MG/DL — HIGH (ref 0.5–1.3)
CREAT SERPL-MCNC: 4.53 MG/DL — HIGH (ref 0.5–1.3)
CREAT SERPL-MCNC: 4.56 MG/DL — HIGH (ref 0.5–1.3)
CREAT SERPL-MCNC: 4.59 MG/DL — HIGH (ref 0.5–1.3)
CREAT SERPL-MCNC: 4.62 MG/DL — HIGH (ref 0.5–1.3)
CREAT SERPL-MCNC: 4.63 MG/DL — HIGH (ref 0.5–1.3)
CREAT SERPL-MCNC: 4.65 MG/DL — HIGH (ref 0.5–1.3)
CREAT SERPL-MCNC: 4.68 MG/DL — HIGH (ref 0.5–1.3)
CREAT SERPL-MCNC: 4.69 MG/DL — HIGH (ref 0.5–1.3)
CREAT SERPL-MCNC: 4.69 MG/DL — HIGH (ref 0.5–1.3)
CREAT SERPL-MCNC: 4.74 MG/DL — HIGH (ref 0.5–1.3)
CREAT SERPL-MCNC: 4.74 MG/DL — HIGH (ref 0.5–1.3)
CREAT SERPL-MCNC: 4.78 MG/DL — HIGH (ref 0.5–1.3)
CREAT SERPL-MCNC: 4.82 MG/DL — HIGH (ref 0.5–1.3)
CREAT SERPL-MCNC: 4.83 MG/DL — HIGH (ref 0.5–1.3)
CREAT SERPL-MCNC: 4.86 MG/DL — HIGH (ref 0.5–1.3)
CREAT SERPL-MCNC: 4.87 MG/DL — HIGH (ref 0.5–1.3)
CREAT SERPL-MCNC: 4.87 MG/DL — HIGH (ref 0.5–1.3)
CREAT SERPL-MCNC: 4.9 MG/DL — HIGH (ref 0.5–1.3)
CREAT SERPL-MCNC: 4.97 MG/DL — HIGH (ref 0.5–1.3)
CREAT SERPL-MCNC: 5 MG/DL — HIGH (ref 0.5–1.3)
CREAT SERPL-MCNC: 5.02 MG/DL — HIGH (ref 0.5–1.3)
CREAT SERPL-MCNC: 5.12 MG/DL — HIGH (ref 0.5–1.3)
CREAT SERPL-MCNC: 5.19 MG/DL — HIGH (ref 0.5–1.3)
CREAT SERPL-MCNC: 5.23 MG/DL — HIGH (ref 0.5–1.3)
CREAT SERPL-MCNC: 5.29 MG/DL — HIGH (ref 0.5–1.3)
CREAT SERPL-MCNC: 5.4 MG/DL — HIGH (ref 0.5–1.3)
CREAT SERPL-MCNC: 5.44 MG/DL — HIGH (ref 0.5–1.3)
CREAT SERPL-MCNC: 5.63 MG/DL — HIGH (ref 0.5–1.3)
CREAT SERPL-MCNC: 5.71 MG/DL — HIGH (ref 0.5–1.3)
CREAT SERPL-MCNC: 5.72 MG/DL — HIGH (ref 0.5–1.3)
CREAT SERPL-MCNC: 5.74 MG/DL — HIGH (ref 0.5–1.3)
CREAT SERPL-MCNC: 5.75 MG/DL — HIGH (ref 0.5–1.3)
CREAT SERPL-MCNC: 5.84 MG/DL — HIGH (ref 0.5–1.3)
CREAT SERPL-MCNC: 5.84 MG/DL — HIGH (ref 0.5–1.3)
CREAT SERPL-MCNC: 5.87 MG/DL — HIGH (ref 0.5–1.3)
CREAT SERPL-MCNC: 6.47 MG/DL — HIGH (ref 0.5–1.3)
CREAT SERPL-MCNC: 6.48 MG/DL — HIGH (ref 0.5–1.3)
CREAT SERPL-MCNC: 6.72 MG/DL — HIGH (ref 0.5–1.3)
CREAT SERPL-MCNC: 6.88 MG/DL — HIGH (ref 0.5–1.3)
CREAT SERPL-MCNC: 6.9 MG/DL — HIGH (ref 0.5–1.3)
CREAT SERPL-MCNC: 7.03 MG/DL — HIGH (ref 0.5–1.3)
CREAT SERPL-MCNC: 7.18 MG/DL — HIGH (ref 0.5–1.3)
CULTURE RESULTS: NO GROWTH — SIGNIFICANT CHANGE UP
CULTURE RESULTS: SIGNIFICANT CHANGE UP
D DIMER BLD IA.RAPID-MCNC: 433 NG/ML DDU — HIGH
DACRYOCYTES BLD QL SMEAR: SLIGHT — SIGNIFICANT CHANGE UP
DIALYSIS INSTRUMENT RESULT - HEPATITIS B SURFACE ANTIGEN: NEGATIVE — SIGNIFICANT CHANGE UP
DIALYSIS INSTRUMENT RESULT - HEPATITIS B SURFACE ANTIGEN: NEGATIVE — SIGNIFICANT CHANGE UP
DIFF PNL FLD: ABNORMAL
DIFF PNL FLD: NEGATIVE — SIGNIFICANT CHANGE UP
EGFR: 10 ML/MIN/1.73M2 — LOW
EGFR: 11 ML/MIN/1.73M2 — LOW
EGFR: 12 ML/MIN/1.73M2 — LOW
EGFR: 13 ML/MIN/1.73M2 — LOW
EGFR: 14 ML/MIN/1.73M2 — LOW
EGFR: 15 ML/MIN/1.73M2 — LOW
EGFR: 16 ML/MIN/1.73M2 — LOW
EGFR: 17 ML/MIN/1.73M2 — LOW
EGFR: 18 ML/MIN/1.73M2 — LOW
EGFR: 19 ML/MIN/1.73M2 — LOW
EGFR: 19 ML/MIN/1.73M2 — LOW
EGFR: 20 ML/MIN/1.73M2 — LOW
EGFR: 21 ML/MIN/1.73M2 — LOW
EGFR: 22 ML/MIN/1.73M2 — LOW
EGFR: 24 ML/MIN/1.73M2 — LOW
EGFR: 25 ML/MIN/1.73M2 — LOW
EGFR: 26 ML/MIN/1.73M2 — LOW
EGFR: 27 ML/MIN/1.73M2 — LOW
EGFR: 31 ML/MIN/1.73M2 — LOW
EGFR: 31 ML/MIN/1.73M2 — LOW
EGFR: 32 ML/MIN/1.73M2 — LOW
EGFR: 33 ML/MIN/1.73M2 — LOW
EGFR: 43 ML/MIN/1.73M2 — LOW
EGFR: 8 ML/MIN/1.73M2 — LOW
EGFR: 9 ML/MIN/1.73M2 — LOW
ELLIPTOCYTES BLD QL SMEAR: SLIGHT — SIGNIFICANT CHANGE UP
EOSINOPHIL # BLD AUTO: 0 K/UL — SIGNIFICANT CHANGE UP (ref 0–0.5)
EOSINOPHIL # BLD AUTO: 0.05 K/UL — SIGNIFICANT CHANGE UP (ref 0–0.5)
EOSINOPHIL # BLD AUTO: 0.05 K/UL — SIGNIFICANT CHANGE UP (ref 0–0.5)
EOSINOPHIL # BLD AUTO: 0.06 K/UL — SIGNIFICANT CHANGE UP (ref 0–0.5)
EOSINOPHIL # BLD AUTO: 0.07 K/UL — SIGNIFICANT CHANGE UP (ref 0–0.5)
EOSINOPHIL # BLD AUTO: 0.09 K/UL — SIGNIFICANT CHANGE UP (ref 0–0.5)
EOSINOPHIL # BLD AUTO: 0.19 K/UL — SIGNIFICANT CHANGE UP (ref 0–0.5)
EOSINOPHIL # BLD AUTO: 0.2 K/UL — SIGNIFICANT CHANGE UP (ref 0–0.5)
EOSINOPHIL # BLD AUTO: 0.2 K/UL — SIGNIFICANT CHANGE UP (ref 0–0.5)
EOSINOPHIL # BLD AUTO: 0.21 K/UL — SIGNIFICANT CHANGE UP (ref 0–0.5)
EOSINOPHIL # BLD AUTO: 0.23 K/UL — SIGNIFICANT CHANGE UP (ref 0–0.5)
EOSINOPHIL NFR BLD AUTO: 0 % — SIGNIFICANT CHANGE UP (ref 0–6)
EOSINOPHIL NFR BLD AUTO: 0.7 % — SIGNIFICANT CHANGE UP (ref 0–6)
EOSINOPHIL NFR BLD AUTO: 0.9 % — SIGNIFICANT CHANGE UP (ref 0–6)
EOSINOPHIL NFR BLD AUTO: 0.9 % — SIGNIFICANT CHANGE UP (ref 0–6)
EOSINOPHIL NFR BLD AUTO: 1 % — SIGNIFICANT CHANGE UP (ref 0–6)
EOSINOPHIL NFR BLD AUTO: 1.1 % — SIGNIFICANT CHANGE UP (ref 0–6)
EOSINOPHIL NFR BLD AUTO: 2.2 % — SIGNIFICANT CHANGE UP (ref 0–6)
EOSINOPHIL NFR BLD AUTO: 2.3 % — SIGNIFICANT CHANGE UP (ref 0–6)
EOSINOPHIL NFR BLD AUTO: 2.6 % — SIGNIFICANT CHANGE UP (ref 0–6)
EOSINOPHIL NFR BLD AUTO: 2.8 % — SIGNIFICANT CHANGE UP (ref 0–6)
EOSINOPHIL NFR BLD AUTO: 3 % — SIGNIFICANT CHANGE UP (ref 0–6)
EPI CELLS # UR: SIGNIFICANT CHANGE UP
EPO SERPL-MCNC: 23 MIU/ML — HIGH (ref 2.6–18.5)
ESTIMATED AVERAGE GLUCOSE: 71 — SIGNIFICANT CHANGE UP
ETHANOL SERPL-MCNC: <10 MG/DL — SIGNIFICANT CHANGE UP
FERRITIN SERPL-MCNC: 1002 NG/ML — HIGH (ref 30–400)
FERRITIN SERPL-MCNC: 1081 NG/ML — HIGH (ref 30–400)
FERRITIN SERPL-MCNC: 311 NG/ML — SIGNIFICANT CHANGE UP (ref 30–400)
FIBRINOGEN PPP-MCNC: 320 MG/DL — SIGNIFICANT CHANGE UP (ref 200–480)
FLUAV SUBTYP SPEC NAA+PROBE: SIGNIFICANT CHANGE UP
FLUAV SUBTYP SPEC NAA+PROBE: SIGNIFICANT CHANGE UP
FLUBV RNA SPEC QL NAA+PROBE: SIGNIFICANT CHANGE UP
FLUBV RNA SPEC QL NAA+PROBE: SIGNIFICANT CHANGE UP
FOLATE SERPL-MCNC: 6.2 NG/ML — SIGNIFICANT CHANGE UP
FOLATE SERPL-MCNC: 7.5 NG/ML — SIGNIFICANT CHANGE UP (ref 3.1–17.5)
GAS PNL BLDA: SIGNIFICANT CHANGE UP
GAS PNL BLDA: SIGNIFICANT CHANGE UP
GAS PNL BLDV: 135 MMOL/L — LOW (ref 136–145)
GIANT PLATELETS BLD QL SMEAR: PRESENT — SIGNIFICANT CHANGE UP
GLUCOSE BLDC GLUCOMTR-MCNC: 101 MG/DL — HIGH (ref 70–99)
GLUCOSE BLDC GLUCOMTR-MCNC: 104 MG/DL — HIGH (ref 70–99)
GLUCOSE BLDC GLUCOMTR-MCNC: 104 MG/DL — HIGH (ref 70–99)
GLUCOSE BLDC GLUCOMTR-MCNC: 105 MG/DL — HIGH (ref 70–99)
GLUCOSE BLDC GLUCOMTR-MCNC: 105 MG/DL — HIGH (ref 70–99)
GLUCOSE BLDC GLUCOMTR-MCNC: 106 MG/DL — HIGH (ref 70–99)
GLUCOSE BLDC GLUCOMTR-MCNC: 113 MG/DL — HIGH (ref 70–99)
GLUCOSE BLDC GLUCOMTR-MCNC: 114 MG/DL — HIGH (ref 70–99)
GLUCOSE BLDC GLUCOMTR-MCNC: 115 MG/DL — HIGH (ref 70–99)
GLUCOSE BLDC GLUCOMTR-MCNC: 116 MG/DL — HIGH (ref 70–99)
GLUCOSE BLDC GLUCOMTR-MCNC: 117 MG/DL — HIGH (ref 70–99)
GLUCOSE BLDC GLUCOMTR-MCNC: 117 MG/DL — HIGH (ref 70–99)
GLUCOSE BLDC GLUCOMTR-MCNC: 118 MG/DL — HIGH (ref 70–99)
GLUCOSE BLDC GLUCOMTR-MCNC: 118 MG/DL — HIGH (ref 70–99)
GLUCOSE BLDC GLUCOMTR-MCNC: 119 MG/DL — HIGH (ref 70–99)
GLUCOSE BLDC GLUCOMTR-MCNC: 120 MG/DL — HIGH (ref 70–99)
GLUCOSE BLDC GLUCOMTR-MCNC: 120 MG/DL — HIGH (ref 70–99)
GLUCOSE BLDC GLUCOMTR-MCNC: 121 MG/DL — HIGH (ref 70–99)
GLUCOSE BLDC GLUCOMTR-MCNC: 126 MG/DL — HIGH (ref 70–99)
GLUCOSE BLDC GLUCOMTR-MCNC: 126 MG/DL — HIGH (ref 70–99)
GLUCOSE BLDC GLUCOMTR-MCNC: 127 MG/DL — HIGH (ref 70–99)
GLUCOSE BLDC GLUCOMTR-MCNC: 129 MG/DL — HIGH (ref 70–99)
GLUCOSE BLDC GLUCOMTR-MCNC: 129 MG/DL — HIGH (ref 70–99)
GLUCOSE BLDC GLUCOMTR-MCNC: 130 MG/DL — HIGH (ref 70–99)
GLUCOSE BLDC GLUCOMTR-MCNC: 130 MG/DL — HIGH (ref 70–99)
GLUCOSE BLDC GLUCOMTR-MCNC: 135 MG/DL — HIGH (ref 70–99)
GLUCOSE BLDC GLUCOMTR-MCNC: 137 MG/DL — HIGH (ref 70–99)
GLUCOSE BLDC GLUCOMTR-MCNC: 137 MG/DL — HIGH (ref 70–99)
GLUCOSE BLDC GLUCOMTR-MCNC: 138 MG/DL — HIGH (ref 70–99)
GLUCOSE BLDC GLUCOMTR-MCNC: 139 MG/DL — HIGH (ref 70–99)
GLUCOSE BLDC GLUCOMTR-MCNC: 144 MG/DL — HIGH (ref 70–99)
GLUCOSE BLDC GLUCOMTR-MCNC: 144 MG/DL — HIGH (ref 70–99)
GLUCOSE BLDC GLUCOMTR-MCNC: 147 MG/DL — HIGH (ref 70–99)
GLUCOSE BLDC GLUCOMTR-MCNC: 149 MG/DL — HIGH (ref 70–99)
GLUCOSE BLDC GLUCOMTR-MCNC: 156 MG/DL — HIGH (ref 70–99)
GLUCOSE BLDC GLUCOMTR-MCNC: 158 MG/DL — HIGH (ref 70–99)
GLUCOSE BLDC GLUCOMTR-MCNC: 159 MG/DL — HIGH (ref 70–99)
GLUCOSE BLDC GLUCOMTR-MCNC: 159 MG/DL — HIGH (ref 70–99)
GLUCOSE BLDC GLUCOMTR-MCNC: 173 MG/DL — HIGH (ref 70–99)
GLUCOSE BLDC GLUCOMTR-MCNC: 177 MG/DL — HIGH (ref 70–99)
GLUCOSE BLDC GLUCOMTR-MCNC: 177 MG/DL — HIGH (ref 70–99)
GLUCOSE BLDC GLUCOMTR-MCNC: 184 MG/DL — HIGH (ref 70–99)
GLUCOSE BLDC GLUCOMTR-MCNC: 187 MG/DL — HIGH (ref 70–99)
GLUCOSE BLDC GLUCOMTR-MCNC: 192 MG/DL — HIGH (ref 70–99)
GLUCOSE BLDC GLUCOMTR-MCNC: 194 MG/DL — HIGH (ref 70–99)
GLUCOSE BLDC GLUCOMTR-MCNC: 200 MG/DL — HIGH (ref 70–99)
GLUCOSE BLDC GLUCOMTR-MCNC: 207 MG/DL — HIGH (ref 70–99)
GLUCOSE BLDC GLUCOMTR-MCNC: 207 MG/DL — HIGH (ref 70–99)
GLUCOSE BLDC GLUCOMTR-MCNC: 208 MG/DL — HIGH (ref 70–99)
GLUCOSE BLDC GLUCOMTR-MCNC: 227 MG/DL — HIGH (ref 70–99)
GLUCOSE BLDC GLUCOMTR-MCNC: 227 MG/DL — HIGH (ref 70–99)
GLUCOSE BLDC GLUCOMTR-MCNC: 229 MG/DL — HIGH (ref 70–99)
GLUCOSE BLDC GLUCOMTR-MCNC: 245 MG/DL — HIGH (ref 70–99)
GLUCOSE BLDC GLUCOMTR-MCNC: 245 MG/DL — HIGH (ref 70–99)
GLUCOSE BLDC GLUCOMTR-MCNC: 280 MG/DL — HIGH (ref 70–99)
GLUCOSE BLDC GLUCOMTR-MCNC: 295 MG/DL — HIGH (ref 70–99)
GLUCOSE BLDC GLUCOMTR-MCNC: 41 MG/DL — CRITICAL LOW (ref 70–99)
GLUCOSE BLDC GLUCOMTR-MCNC: 43 MG/DL — CRITICAL LOW (ref 70–99)
GLUCOSE BLDC GLUCOMTR-MCNC: 58 MG/DL — LOW (ref 70–99)
GLUCOSE BLDC GLUCOMTR-MCNC: 58 MG/DL — LOW (ref 70–99)
GLUCOSE BLDC GLUCOMTR-MCNC: 59 MG/DL — LOW (ref 70–99)
GLUCOSE BLDC GLUCOMTR-MCNC: 61 MG/DL — LOW (ref 70–99)
GLUCOSE BLDC GLUCOMTR-MCNC: 62 MG/DL — LOW (ref 70–99)
GLUCOSE BLDC GLUCOMTR-MCNC: 63 MG/DL — LOW (ref 70–99)
GLUCOSE BLDC GLUCOMTR-MCNC: 67 MG/DL — LOW (ref 70–99)
GLUCOSE BLDC GLUCOMTR-MCNC: 71 MG/DL — SIGNIFICANT CHANGE UP (ref 70–99)
GLUCOSE BLDC GLUCOMTR-MCNC: 72 MG/DL — SIGNIFICANT CHANGE UP (ref 70–99)
GLUCOSE BLDC GLUCOMTR-MCNC: 74 MG/DL — SIGNIFICANT CHANGE UP (ref 70–99)
GLUCOSE BLDC GLUCOMTR-MCNC: 76 MG/DL — SIGNIFICANT CHANGE UP (ref 70–99)
GLUCOSE BLDC GLUCOMTR-MCNC: 77 MG/DL — SIGNIFICANT CHANGE UP (ref 70–99)
GLUCOSE BLDC GLUCOMTR-MCNC: 78 MG/DL — SIGNIFICANT CHANGE UP (ref 70–99)
GLUCOSE BLDC GLUCOMTR-MCNC: 78 MG/DL — SIGNIFICANT CHANGE UP (ref 70–99)
GLUCOSE BLDC GLUCOMTR-MCNC: 80 MG/DL — SIGNIFICANT CHANGE UP (ref 70–99)
GLUCOSE BLDC GLUCOMTR-MCNC: 81 MG/DL — SIGNIFICANT CHANGE UP (ref 70–99)
GLUCOSE BLDC GLUCOMTR-MCNC: 81 MG/DL — SIGNIFICANT CHANGE UP (ref 70–99)
GLUCOSE BLDC GLUCOMTR-MCNC: 82 MG/DL — SIGNIFICANT CHANGE UP (ref 70–99)
GLUCOSE BLDC GLUCOMTR-MCNC: 83 MG/DL — SIGNIFICANT CHANGE UP (ref 70–99)
GLUCOSE BLDC GLUCOMTR-MCNC: 83 MG/DL — SIGNIFICANT CHANGE UP (ref 70–99)
GLUCOSE BLDC GLUCOMTR-MCNC: 85 MG/DL — SIGNIFICANT CHANGE UP (ref 70–99)
GLUCOSE BLDC GLUCOMTR-MCNC: 85 MG/DL — SIGNIFICANT CHANGE UP (ref 70–99)
GLUCOSE BLDC GLUCOMTR-MCNC: 87 MG/DL — SIGNIFICANT CHANGE UP (ref 70–99)
GLUCOSE BLDC GLUCOMTR-MCNC: 88 MG/DL — SIGNIFICANT CHANGE UP (ref 70–99)
GLUCOSE BLDC GLUCOMTR-MCNC: 88 MG/DL — SIGNIFICANT CHANGE UP (ref 70–99)
GLUCOSE BLDC GLUCOMTR-MCNC: 89 MG/DL — SIGNIFICANT CHANGE UP (ref 70–99)
GLUCOSE BLDC GLUCOMTR-MCNC: 90 MG/DL — SIGNIFICANT CHANGE UP (ref 70–99)
GLUCOSE BLDC GLUCOMTR-MCNC: 92 MG/DL — SIGNIFICANT CHANGE UP (ref 70–99)
GLUCOSE BLDC GLUCOMTR-MCNC: 92 MG/DL — SIGNIFICANT CHANGE UP (ref 70–99)
GLUCOSE BLDC GLUCOMTR-MCNC: 93 MG/DL — SIGNIFICANT CHANGE UP (ref 70–99)
GLUCOSE BLDC GLUCOMTR-MCNC: 95 MG/DL — SIGNIFICANT CHANGE UP (ref 70–99)
GLUCOSE BLDC GLUCOMTR-MCNC: 96 MG/DL — SIGNIFICANT CHANGE UP (ref 70–99)
GLUCOSE BLDC GLUCOMTR-MCNC: 99 MG/DL — SIGNIFICANT CHANGE UP (ref 70–99)
GLUCOSE BLDC GLUCOMTR-MCNC: 99 MG/DL — SIGNIFICANT CHANGE UP (ref 70–99)
GLUCOSE BLDV-MCNC: 77 MG/DL — SIGNIFICANT CHANGE UP (ref 70–99)
GLUCOSE SERPL-MCNC: 100 MG/DL — HIGH (ref 70–99)
GLUCOSE SERPL-MCNC: 104 MG/DL — HIGH (ref 70–99)
GLUCOSE SERPL-MCNC: 105 MG/DL — HIGH (ref 70–99)
GLUCOSE SERPL-MCNC: 107 MG/DL — HIGH (ref 70–99)
GLUCOSE SERPL-MCNC: 107 MG/DL — HIGH (ref 70–99)
GLUCOSE SERPL-MCNC: 110 MG/DL — HIGH (ref 70–99)
GLUCOSE SERPL-MCNC: 112 MG/DL — HIGH (ref 70–99)
GLUCOSE SERPL-MCNC: 112 MG/DL — HIGH (ref 70–99)
GLUCOSE SERPL-MCNC: 113 MG/DL — HIGH (ref 70–99)
GLUCOSE SERPL-MCNC: 114 MG/DL — HIGH (ref 70–99)
GLUCOSE SERPL-MCNC: 115 MG/DL — HIGH (ref 70–99)
GLUCOSE SERPL-MCNC: 119 MG/DL — HIGH (ref 70–99)
GLUCOSE SERPL-MCNC: 120 MG/DL — HIGH (ref 70–99)
GLUCOSE SERPL-MCNC: 122 MG/DL — HIGH (ref 70–99)
GLUCOSE SERPL-MCNC: 123 MG/DL — HIGH (ref 70–99)
GLUCOSE SERPL-MCNC: 125 MG/DL — HIGH (ref 70–99)
GLUCOSE SERPL-MCNC: 126 MG/DL — HIGH (ref 70–99)
GLUCOSE SERPL-MCNC: 126 MG/DL — HIGH (ref 70–99)
GLUCOSE SERPL-MCNC: 127 MG/DL — HIGH (ref 70–99)
GLUCOSE SERPL-MCNC: 129 MG/DL — HIGH (ref 70–99)
GLUCOSE SERPL-MCNC: 133 MG/DL — HIGH (ref 70–99)
GLUCOSE SERPL-MCNC: 135 MG/DL — HIGH (ref 70–99)
GLUCOSE SERPL-MCNC: 139 MG/DL — HIGH (ref 70–99)
GLUCOSE SERPL-MCNC: 144 MG/DL — HIGH (ref 70–99)
GLUCOSE SERPL-MCNC: 147 MG/DL — HIGH (ref 70–99)
GLUCOSE SERPL-MCNC: 155 MG/DL — HIGH (ref 70–99)
GLUCOSE SERPL-MCNC: 156 MG/DL — HIGH (ref 70–99)
GLUCOSE SERPL-MCNC: 167 MG/DL — HIGH (ref 70–99)
GLUCOSE SERPL-MCNC: 178 MG/DL — HIGH (ref 70–99)
GLUCOSE SERPL-MCNC: 179 MG/DL — HIGH (ref 70–99)
GLUCOSE SERPL-MCNC: 209 MG/DL — HIGH (ref 70–99)
GLUCOSE SERPL-MCNC: 25 MG/DL — CRITICAL LOW (ref 70–99)
GLUCOSE SERPL-MCNC: 293 MG/DL — HIGH (ref 70–99)
GLUCOSE SERPL-MCNC: 296 MG/DL — HIGH (ref 70–99)
GLUCOSE SERPL-MCNC: 302 MG/DL — HIGH (ref 70–99)
GLUCOSE SERPL-MCNC: 68 MG/DL — LOW (ref 70–99)
GLUCOSE SERPL-MCNC: 69 MG/DL — LOW (ref 70–99)
GLUCOSE SERPL-MCNC: 72 MG/DL — SIGNIFICANT CHANGE UP (ref 70–99)
GLUCOSE SERPL-MCNC: 72 MG/DL — SIGNIFICANT CHANGE UP (ref 70–99)
GLUCOSE SERPL-MCNC: 73 MG/DL — SIGNIFICANT CHANGE UP (ref 70–99)
GLUCOSE SERPL-MCNC: 73 MG/DL — SIGNIFICANT CHANGE UP (ref 70–99)
GLUCOSE SERPL-MCNC: 74 MG/DL — SIGNIFICANT CHANGE UP (ref 70–99)
GLUCOSE SERPL-MCNC: 76 MG/DL — SIGNIFICANT CHANGE UP (ref 70–99)
GLUCOSE SERPL-MCNC: 76 MG/DL — SIGNIFICANT CHANGE UP (ref 70–99)
GLUCOSE SERPL-MCNC: 78 MG/DL — SIGNIFICANT CHANGE UP (ref 70–99)
GLUCOSE SERPL-MCNC: 79 MG/DL — SIGNIFICANT CHANGE UP (ref 70–99)
GLUCOSE SERPL-MCNC: 79 MG/DL — SIGNIFICANT CHANGE UP (ref 70–99)
GLUCOSE SERPL-MCNC: 81 MG/DL — SIGNIFICANT CHANGE UP (ref 70–99)
GLUCOSE SERPL-MCNC: 81 MG/DL — SIGNIFICANT CHANGE UP (ref 70–99)
GLUCOSE SERPL-MCNC: 83 MG/DL — SIGNIFICANT CHANGE UP (ref 70–99)
GLUCOSE SERPL-MCNC: 84 MG/DL — SIGNIFICANT CHANGE UP (ref 70–99)
GLUCOSE SERPL-MCNC: 86 MG/DL — SIGNIFICANT CHANGE UP (ref 70–99)
GLUCOSE SERPL-MCNC: 87 MG/DL — SIGNIFICANT CHANGE UP (ref 70–99)
GLUCOSE SERPL-MCNC: 87 MG/DL — SIGNIFICANT CHANGE UP (ref 70–99)
GLUCOSE SERPL-MCNC: 88 MG/DL — SIGNIFICANT CHANGE UP (ref 70–99)
GLUCOSE SERPL-MCNC: 89 MG/DL — SIGNIFICANT CHANGE UP (ref 70–99)
GLUCOSE SERPL-MCNC: 91 MG/DL — SIGNIFICANT CHANGE UP (ref 70–99)
GLUCOSE SERPL-MCNC: 91 MG/DL — SIGNIFICANT CHANGE UP (ref 70–99)
GLUCOSE SERPL-MCNC: 93 MG/DL — SIGNIFICANT CHANGE UP (ref 70–99)
GLUCOSE SERPL-MCNC: 94 MG/DL — SIGNIFICANT CHANGE UP (ref 70–99)
GLUCOSE SERPL-MCNC: 94 MG/DL — SIGNIFICANT CHANGE UP (ref 70–99)
GLUCOSE SERPL-MCNC: 95 MG/DL — SIGNIFICANT CHANGE UP (ref 70–99)
GLUCOSE SERPL-MCNC: 98 MG/DL — SIGNIFICANT CHANGE UP (ref 70–99)
GLUCOSE SERPL-MCNC: 99 MG/DL — SIGNIFICANT CHANGE UP (ref 70–99)
GLUCOSE UR QL: 100 MG/DL
GLUCOSE UR QL: NEGATIVE MG/DL — SIGNIFICANT CHANGE UP
GRAM STN FLD: SIGNIFICANT CHANGE UP
GRAM STN FLD: SIGNIFICANT CHANGE UP
HADV DNA SPEC QL NAA+PROBE: SIGNIFICANT CHANGE UP
HADV DNA SPEC QL NAA+PROBE: SIGNIFICANT CHANGE UP
HAPTOGLOB SERPL-MCNC: 132 MG/DL — SIGNIFICANT CHANGE UP (ref 34–200)
HAPTOGLOB SERPL-MCNC: 21 MG/DL — LOW (ref 34–200)
HAPTOGLOB SERPL-MCNC: 86 MG/DL — SIGNIFICANT CHANGE UP (ref 34–200)
HBV CORE AB SER-ACNC: REACTIVE
HBV CORE IGM SER-ACNC: SIGNIFICANT CHANGE UP
HBV CORE IGM SER-ACNC: SIGNIFICANT CHANGE UP
HBV SURFACE AB SER-ACNC: 10.9 MIU/ML — LOW
HBV SURFACE AB SER-ACNC: 5.2 MIU/ML — LOW
HBV SURFACE AB SER-ACNC: 5.2 MIU/ML — LOW
HBV SURFACE AG SER-ACNC: SIGNIFICANT CHANGE UP
HCO3 BLDA-SCNC: 26 MMOL/L — SIGNIFICANT CHANGE UP (ref 21–28)
HCO3 BLDA-SCNC: 26 MMOL/L — SIGNIFICANT CHANGE UP (ref 21–28)
HCO3 BLDV-SCNC: 30 MMOL/L — HIGH (ref 22–29)
HCOV 229E RNA SPEC QL NAA+PROBE: SIGNIFICANT CHANGE UP
HCOV 229E RNA SPEC QL NAA+PROBE: SIGNIFICANT CHANGE UP
HCOV HKU1 RNA SPEC QL NAA+PROBE: SIGNIFICANT CHANGE UP
HCOV HKU1 RNA SPEC QL NAA+PROBE: SIGNIFICANT CHANGE UP
HCOV NL63 RNA SPEC QL NAA+PROBE: SIGNIFICANT CHANGE UP
HCOV NL63 RNA SPEC QL NAA+PROBE: SIGNIFICANT CHANGE UP
HCOV OC43 RNA SPEC QL NAA+PROBE: SIGNIFICANT CHANGE UP
HCOV OC43 RNA SPEC QL NAA+PROBE: SIGNIFICANT CHANGE UP
HCT VFR BLD CALC: 19.5 % — CRITICAL LOW (ref 39–50)
HCT VFR BLD CALC: 20.9 % — CRITICAL LOW (ref 39–50)
HCT VFR BLD CALC: 21.6 % — LOW (ref 39–50)
HCT VFR BLD CALC: 21.6 % — LOW (ref 39–50)
HCT VFR BLD CALC: 21.8 % — LOW (ref 39–50)
HCT VFR BLD CALC: 21.9 % — LOW (ref 39–50)
HCT VFR BLD CALC: 22.4 % — LOW (ref 39–50)
HCT VFR BLD CALC: 22.7 % — LOW (ref 39–50)
HCT VFR BLD CALC: 22.8 % — LOW (ref 39–50)
HCT VFR BLD CALC: 23.1 % — LOW (ref 39–50)
HCT VFR BLD CALC: 23.3 % — LOW (ref 39–50)
HCT VFR BLD CALC: 23.4 % — LOW (ref 39–50)
HCT VFR BLD CALC: 23.5 % — LOW (ref 39–50)
HCT VFR BLD CALC: 23.6 % — LOW (ref 39–50)
HCT VFR BLD CALC: 23.6 % — LOW (ref 39–50)
HCT VFR BLD CALC: 23.9 % — LOW (ref 39–50)
HCT VFR BLD CALC: 24.1 % — LOW (ref 39–50)
HCT VFR BLD CALC: 24.3 % — LOW (ref 39–50)
HCT VFR BLD CALC: 24.3 % — LOW (ref 39–50)
HCT VFR BLD CALC: 24.7 % — LOW (ref 39–50)
HCT VFR BLD CALC: 24.8 % — LOW (ref 39–50)
HCT VFR BLD CALC: 24.9 % — LOW (ref 39–50)
HCT VFR BLD CALC: 24.9 % — LOW (ref 39–50)
HCT VFR BLD CALC: 25.1 % — LOW (ref 39–50)
HCT VFR BLD CALC: 25.2 % — LOW (ref 39–50)
HCT VFR BLD CALC: 25.3 % — LOW (ref 39–50)
HCT VFR BLD CALC: 25.8 % — LOW (ref 39–50)
HCT VFR BLD CALC: 25.9 % — LOW (ref 39–50)
HCT VFR BLD CALC: 25.9 % — LOW (ref 39–50)
HCT VFR BLD CALC: 26.4 % — LOW (ref 39–50)
HCT VFR BLD CALC: 26.9 % — LOW (ref 39–50)
HCT VFR BLD CALC: 27.1 % — LOW (ref 39–50)
HCT VFR BLD CALC: 27.2 % — LOW (ref 39–50)
HCT VFR BLD CALC: 27.2 % — LOW (ref 39–50)
HCT VFR BLD CALC: 27.4 % — LOW (ref 39–50)
HCT VFR BLD CALC: 27.5 % — LOW (ref 39–50)
HCT VFR BLD CALC: 27.6 % — LOW (ref 39–50)
HCT VFR BLD CALC: 27.7 % — LOW (ref 39–50)
HCT VFR BLD CALC: 27.8 % — LOW (ref 39–50)
HCT VFR BLD CALC: 27.9 % — LOW (ref 39–50)
HCT VFR BLD CALC: 28 % — LOW (ref 39–50)
HCT VFR BLD CALC: 28 % — LOW (ref 39–50)
HCT VFR BLD CALC: 28.1 % — LOW (ref 39–50)
HCT VFR BLD CALC: 28.2 % — LOW (ref 39–50)
HCT VFR BLD CALC: 28.2 % — LOW (ref 39–50)
HCT VFR BLD CALC: 28.3 % — LOW (ref 39–50)
HCT VFR BLD CALC: 28.6 % — LOW (ref 39–50)
HCT VFR BLD CALC: 28.6 % — LOW (ref 39–50)
HCT VFR BLD CALC: 28.8 % — LOW (ref 39–50)
HCT VFR BLD CALC: 29.3 % — LOW (ref 39–50)
HCT VFR BLD CALC: 29.3 % — LOW (ref 39–50)
HCT VFR BLD CALC: 29.4 % — LOW (ref 39–50)
HCT VFR BLD CALC: 29.9 % — LOW (ref 39–50)
HCT VFR BLD CALC: 29.9 % — LOW (ref 39–50)
HCT VFR BLD CALC: 30.4 % — LOW (ref 39–50)
HCT VFR BLD CALC: 30.5 % — LOW (ref 39–50)
HCT VFR BLD CALC: 30.8 % — LOW (ref 39–50)
HCT VFR BLD CALC: 31.8 % — LOW (ref 39–50)
HCT VFR BLD CALC: 32.4 % — LOW (ref 39–50)
HCT VFR BLD CALC: 32.5 % — LOW (ref 39–50)
HCT VFR BLD CALC: 32.6 % — LOW (ref 39–50)
HCT VFR BLD CALC: 32.7 % — LOW (ref 39–50)
HCT VFR BLD CALC: 32.9 % — LOW (ref 39–50)
HCT VFR BLD CALC: 33.2 % — LOW (ref 39–50)
HCT VFR BLD CALC: 33.8 % — LOW (ref 39–50)
HCT VFR BLD CALC: 34.6 % — LOW (ref 39–50)
HCT VFR BLD CALC: 34.8 % — LOW (ref 39–50)
HCT VFR BLD CALC: 34.9 % — LOW (ref 39–50)
HCT VFR BLD CALC: 35.2 % — LOW (ref 39–50)
HCT VFR BLD CALC: 35.5 % — LOW (ref 39–50)
HCT VFR BLD CALC: 36.7 % — LOW (ref 39–50)
HCT VFR BLD CALC: 36.8 % — LOW (ref 39–50)
HCT VFR BLDA CALC: 34 % — LOW (ref 39–51)
HCV AB S/CO SERPL IA: 4.5 S/CO — HIGH (ref 0–0.99)
HCV AB S/CO SERPL IA: 5.37 S/CO — HIGH (ref 0–0.99)
HCV AB S/CO SERPL IA: 8.31 S/CO — HIGH (ref 0–0.99)
HCV AB SERPL-IMP: ABNORMAL
HCV AB SERPL-IMP: REACTIVE
HCV AB SERPL-IMP: REACTIVE
HCV RNA FLD QL NAA+PROBE: SIGNIFICANT CHANGE UP
HCV RNA FLD QL NAA+PROBE: SIGNIFICANT CHANGE UP
HCV RNA SPEC QL PROBE+SIG AMP: SIGNIFICANT CHANGE UP
HCV RNA SPEC QL PROBE+SIG AMP: SIGNIFICANT CHANGE UP
HDLC SERPL-MCNC: 56 MG/DL — SIGNIFICANT CHANGE UP
HGB BLD CALC-MCNC: 11.4 G/DL — LOW (ref 12.6–17.4)
HGB BLD-MCNC: 10 G/DL — LOW (ref 13–17)
HGB BLD-MCNC: 10 G/DL — LOW (ref 13–17)
HGB BLD-MCNC: 10.3 G/DL — LOW (ref 13–17)
HGB BLD-MCNC: 10.5 G/DL — LOW (ref 13–17)
HGB BLD-MCNC: 10.5 G/DL — LOW (ref 13–17)
HGB BLD-MCNC: 10.6 G/DL — LOW (ref 13–17)
HGB BLD-MCNC: 10.7 G/DL — LOW (ref 13–17)
HGB BLD-MCNC: 10.8 G/DL — LOW (ref 13–17)
HGB BLD-MCNC: 10.9 G/DL — LOW (ref 13–17)
HGB BLD-MCNC: 11.2 G/DL — LOW (ref 13–17)
HGB BLD-MCNC: 11.4 G/DL — LOW (ref 13–17)
HGB BLD-MCNC: 11.4 G/DL — LOW (ref 13–17)
HGB BLD-MCNC: 11.5 G/DL — LOW (ref 13–17)
HGB BLD-MCNC: 11.6 G/DL — LOW (ref 13–17)
HGB BLD-MCNC: 11.7 G/DL — LOW (ref 13–17)
HGB BLD-MCNC: 12.4 G/DL — LOW (ref 13–17)
HGB BLD-MCNC: 6.4 G/DL — CRITICAL LOW (ref 13–17)
HGB BLD-MCNC: 6.7 G/DL — CRITICAL LOW (ref 13–17)
HGB BLD-MCNC: 6.9 G/DL — CRITICAL LOW (ref 13–17)
HGB BLD-MCNC: 6.9 G/DL — CRITICAL LOW (ref 13–17)
HGB BLD-MCNC: 7.1 G/DL — LOW (ref 13–17)
HGB BLD-MCNC: 7.2 G/DL — LOW (ref 13–17)
HGB BLD-MCNC: 7.3 G/DL — LOW (ref 13–17)
HGB BLD-MCNC: 7.4 G/DL — LOW (ref 13–17)
HGB BLD-MCNC: 7.5 G/DL — LOW (ref 13–17)
HGB BLD-MCNC: 7.5 G/DL — LOW (ref 13–17)
HGB BLD-MCNC: 7.6 G/DL — LOW (ref 13–17)
HGB BLD-MCNC: 7.7 G/DL — LOW (ref 13–17)
HGB BLD-MCNC: 7.7 G/DL — LOW (ref 13–17)
HGB BLD-MCNC: 7.8 G/DL — LOW (ref 13–17)
HGB BLD-MCNC: 8 G/DL — LOW (ref 13–17)
HGB BLD-MCNC: 8 G/DL — LOW (ref 13–17)
HGB BLD-MCNC: 8.1 G/DL — LOW (ref 13–17)
HGB BLD-MCNC: 8.2 G/DL — LOW (ref 13–17)
HGB BLD-MCNC: 8.3 G/DL — LOW (ref 13–17)
HGB BLD-MCNC: 8.4 G/DL — LOW (ref 13–17)
HGB BLD-MCNC: 8.5 G/DL — LOW (ref 13–17)
HGB BLD-MCNC: 8.7 G/DL — LOW (ref 13–17)
HGB BLD-MCNC: 8.7 G/DL — LOW (ref 13–17)
HGB BLD-MCNC: 9 G/DL — LOW (ref 13–17)
HGB BLD-MCNC: 9.2 G/DL — LOW (ref 13–17)
HGB BLD-MCNC: 9.2 G/DL — LOW (ref 13–17)
HGB BLD-MCNC: 9.4 G/DL — LOW (ref 13–17)
HGB BLD-MCNC: 9.5 G/DL — LOW (ref 13–17)
HGB BLD-MCNC: 9.6 G/DL — LOW (ref 13–17)
HGB BLD-MCNC: 9.6 G/DL — LOW (ref 13–17)
HIV 1+2 AB+HIV1 P24 AG SERPL QL IA: SIGNIFICANT CHANGE UP
HIV 1+2 AB+HIV1 P24 AG SERPL QL IA: SIGNIFICANT CHANGE UP
HMPV RNA SPEC QL NAA+PROBE: SIGNIFICANT CHANGE UP
HMPV RNA SPEC QL NAA+PROBE: SIGNIFICANT CHANGE UP
HOROWITZ INDEX BLDA+IHG-RTO: 100 — SIGNIFICANT CHANGE UP
HOROWITZ INDEX BLDA+IHG-RTO: 40 — SIGNIFICANT CHANGE UP
HPIV1 RNA SPEC QL NAA+PROBE: SIGNIFICANT CHANGE UP
HPIV1 RNA SPEC QL NAA+PROBE: SIGNIFICANT CHANGE UP
HPIV2 RNA SPEC QL NAA+PROBE: SIGNIFICANT CHANGE UP
HPIV2 RNA SPEC QL NAA+PROBE: SIGNIFICANT CHANGE UP
HPIV3 RNA SPEC QL NAA+PROBE: SIGNIFICANT CHANGE UP
HPIV3 RNA SPEC QL NAA+PROBE: SIGNIFICANT CHANGE UP
HPIV4 RNA SPEC QL NAA+PROBE: SIGNIFICANT CHANGE UP
HPIV4 RNA SPEC QL NAA+PROBE: SIGNIFICANT CHANGE UP
HYPOCHROMIA BLD QL: SLIGHT — SIGNIFICANT CHANGE UP
IANC: 3.85 K/UL — SIGNIFICANT CHANGE UP (ref 1.8–7.4)
IANC: 4.39 K/UL — SIGNIFICANT CHANGE UP (ref 1.8–7.4)
IANC: 4.45 K/UL — SIGNIFICANT CHANGE UP (ref 1.8–7.4)
IANC: 4.48 K/UL — SIGNIFICANT CHANGE UP (ref 1.8–7.4)
IANC: 4.58 K/UL — SIGNIFICANT CHANGE UP (ref 1.8–7.4)
IANC: 4.6 K/UL — SIGNIFICANT CHANGE UP (ref 1.8–7.4)
IANC: 4.73 K/UL — SIGNIFICANT CHANGE UP (ref 1.8–7.4)
IANC: 4.82 K/UL — SIGNIFICANT CHANGE UP (ref 1.8–7.4)
IANC: 4.96 K/UL — SIGNIFICANT CHANGE UP (ref 1.8–7.4)
IANC: 5.1 K/UL — SIGNIFICANT CHANGE UP (ref 1.8–7.4)
IANC: 6.2 K/UL — SIGNIFICANT CHANGE UP (ref 1.8–7.4)
IANC: 6.32 K/UL — SIGNIFICANT CHANGE UP (ref 1.8–7.4)
IANC: 6.58 K/UL — SIGNIFICANT CHANGE UP (ref 1.8–7.4)
IMM GRANULOCYTES NFR BLD AUTO: 0.2 % — SIGNIFICANT CHANGE UP (ref 0–0.9)
IMM GRANULOCYTES NFR BLD AUTO: 0.2 % — SIGNIFICANT CHANGE UP (ref 0–0.9)
IMM GRANULOCYTES NFR BLD AUTO: 0.3 % — SIGNIFICANT CHANGE UP (ref 0–0.9)
IMM GRANULOCYTES NFR BLD AUTO: 0.3 % — SIGNIFICANT CHANGE UP (ref 0–0.9)
IMM GRANULOCYTES NFR BLD AUTO: 0.4 % — SIGNIFICANT CHANGE UP (ref 0–0.9)
IMM GRANULOCYTES NFR BLD AUTO: 0.6 % — SIGNIFICANT CHANGE UP (ref 0–0.9)
IMM GRANULOCYTES NFR BLD AUTO: 0.7 % — SIGNIFICANT CHANGE UP (ref 0–0.9)
IMM GRANULOCYTES NFR BLD AUTO: 0.7 % — SIGNIFICANT CHANGE UP (ref 0–0.9)
IMM GRANULOCYTES NFR BLD AUTO: 0.9 % — SIGNIFICANT CHANGE UP (ref 0–0.9)
IMM GRANULOCYTES NFR BLD AUTO: 1 % — HIGH (ref 0–0.9)
IMM GRANULOCYTES NFR BLD AUTO: 1.2 % — HIGH (ref 0–0.9)
INR BLD: 0.99 RATIO — SIGNIFICANT CHANGE UP (ref 0.88–1.16)
INR BLD: 0.99 RATIO — SIGNIFICANT CHANGE UP (ref 0.88–1.16)
INR BLD: 1 RATIO — SIGNIFICANT CHANGE UP (ref 0.88–1.16)
INR BLD: 1.01 RATIO — SIGNIFICANT CHANGE UP (ref 0.88–1.16)
INR BLD: 1.02 RATIO — SIGNIFICANT CHANGE UP (ref 0.88–1.16)
INR BLD: 1.02 RATIO — SIGNIFICANT CHANGE UP (ref 0.88–1.16)
INR BLD: 1.03 RATIO — SIGNIFICANT CHANGE UP (ref 0.88–1.16)
INR BLD: 1.03 RATIO — SIGNIFICANT CHANGE UP (ref 0.88–1.16)
INR BLD: 1.04 RATIO — SIGNIFICANT CHANGE UP (ref 0.88–1.16)
INR BLD: 1.05 RATIO — SIGNIFICANT CHANGE UP (ref 0.88–1.16)
INR BLD: 1.07 RATIO — SIGNIFICANT CHANGE UP (ref 0.88–1.16)
INR BLD: 1.1 RATIO — SIGNIFICANT CHANGE UP (ref 0.88–1.16)
INR BLD: 1.17 RATIO — SIGNIFICANT CHANGE UP (ref 0.85–1.18)
INR BLD: 1.21 RATIO — HIGH (ref 0.85–1.18)
INR BLD: 1.22 RATIO — HIGH (ref 0.85–1.18)
INR BLD: 1.28 RATIO — HIGH (ref 0.85–1.18)
INR BLD: 1.37 RATIO — HIGH (ref 0.85–1.18)
IRON SATN MFR SERPL: 104 UG/DL — SIGNIFICANT CHANGE UP (ref 45–165)
IRON SATN MFR SERPL: 45 UG/DL — SIGNIFICANT CHANGE UP (ref 45–165)
IRON SATN MFR SERPL: 54 % — HIGH (ref 14–50)
IRON SATN MFR SERPL: 83 UG/DL — SIGNIFICANT CHANGE UP (ref 45–165)
IRON SATN MFR SERPL: 95 UG/DL — SIGNIFICANT CHANGE UP (ref 45–165)
IRON SATN MFR SERPL: SIGNIFICANT CHANGE UP % (ref 16–55)
K OXYTOCA DNA BLD POS QL NAA+NON-PROBE: SIGNIFICANT CHANGE UP
KETONES UR-MCNC: NEGATIVE MG/DL — SIGNIFICANT CHANGE UP
KETONES UR-MCNC: NEGATIVE — SIGNIFICANT CHANGE UP
LACTATE BLDV-MCNC: 1.8 MMOL/L — SIGNIFICANT CHANGE UP (ref 0.5–2)
LACTATE SERPL-SCNC: 1.5 MMOL/L — SIGNIFICANT CHANGE UP (ref 0.7–2)
LACTATE SERPL-SCNC: 2.7 MMOL/L — HIGH (ref 0.7–2)
LACTATE SERPL-SCNC: 2.8 MMOL/L — HIGH (ref 0.7–2)
LDH SERPL L TO P-CCNC: 217 U/L — SIGNIFICANT CHANGE UP (ref 135–225)
LDH SERPL L TO P-CCNC: 220 U/L — SIGNIFICANT CHANGE UP (ref 50–242)
LDH SERPL L TO P-CCNC: 230 U/L — HIGH (ref 135–225)
LEGIONELLA AG UR QL: NEGATIVE — SIGNIFICANT CHANGE UP
LEUKOCYTE ESTERASE UR-ACNC: ABNORMAL
LEUKOCYTE ESTERASE UR-ACNC: NEGATIVE — SIGNIFICANT CHANGE UP
LIDOCAIN IGE QN: 12 U/L — LOW (ref 13–75)
LIDOCAIN IGE QN: 12 U/L — SIGNIFICANT CHANGE UP (ref 7–60)
LIDOCAIN IGE QN: 13 U/L — LOW (ref 73–393)
LIDOCAIN IGE QN: 19 U/L — LOW (ref 73–393)
LIDOCAIN IGE QN: 38 U/L — LOW (ref 73–393)
LIPID PNL WITH DIRECT LDL SERPL: 28 MG/DL — SIGNIFICANT CHANGE UP
LYMPHOCYTES # BLD AUTO: 0.54 K/UL — LOW (ref 1–3.3)
LYMPHOCYTES # BLD AUTO: 0.56 K/UL — LOW (ref 1–3.3)
LYMPHOCYTES # BLD AUTO: 0.7 K/UL — LOW (ref 1–3.3)
LYMPHOCYTES # BLD AUTO: 0.84 K/UL — LOW (ref 1–3.3)
LYMPHOCYTES # BLD AUTO: 1.09 K/UL — SIGNIFICANT CHANGE UP (ref 1–3.3)
LYMPHOCYTES # BLD AUTO: 1.12 K/UL — SIGNIFICANT CHANGE UP (ref 1–3.3)
LYMPHOCYTES # BLD AUTO: 1.13 K/UL — SIGNIFICANT CHANGE UP (ref 1–3.3)
LYMPHOCYTES # BLD AUTO: 1.24 K/UL — SIGNIFICANT CHANGE UP (ref 1–3.3)
LYMPHOCYTES # BLD AUTO: 1.28 K/UL — SIGNIFICANT CHANGE UP (ref 1–3.3)
LYMPHOCYTES # BLD AUTO: 1.34 K/UL — SIGNIFICANT CHANGE UP (ref 1–3.3)
LYMPHOCYTES # BLD AUTO: 1.38 K/UL — SIGNIFICANT CHANGE UP (ref 1–3.3)
LYMPHOCYTES # BLD AUTO: 1.45 K/UL — SIGNIFICANT CHANGE UP (ref 1–3.3)
LYMPHOCYTES # BLD AUTO: 1.56 K/UL — SIGNIFICANT CHANGE UP (ref 1–3.3)
LYMPHOCYTES # BLD AUTO: 1.62 K/UL — SIGNIFICANT CHANGE UP (ref 1–3.3)
LYMPHOCYTES # BLD AUTO: 1.75 K/UL — SIGNIFICANT CHANGE UP (ref 1–3.3)
LYMPHOCYTES # BLD AUTO: 1.84 K/UL — SIGNIFICANT CHANGE UP (ref 1–3.3)
LYMPHOCYTES # BLD AUTO: 1.86 K/UL — SIGNIFICANT CHANGE UP (ref 1–3.3)
LYMPHOCYTES # BLD AUTO: 1.88 K/UL — SIGNIFICANT CHANGE UP (ref 1–3.3)
LYMPHOCYTES # BLD AUTO: 11 % — LOW (ref 13–44)
LYMPHOCYTES # BLD AUTO: 14.7 % — SIGNIFICANT CHANGE UP (ref 13–44)
LYMPHOCYTES # BLD AUTO: 15 % — SIGNIFICANT CHANGE UP (ref 13–44)
LYMPHOCYTES # BLD AUTO: 16.4 % — SIGNIFICANT CHANGE UP (ref 13–44)
LYMPHOCYTES # BLD AUTO: 16.8 % — SIGNIFICANT CHANGE UP (ref 13–44)
LYMPHOCYTES # BLD AUTO: 16.8 % — SIGNIFICANT CHANGE UP (ref 13–44)
LYMPHOCYTES # BLD AUTO: 18.6 % — SIGNIFICANT CHANGE UP (ref 13–44)
LYMPHOCYTES # BLD AUTO: 19.3 % — SIGNIFICANT CHANGE UP (ref 13–44)
LYMPHOCYTES # BLD AUTO: 19.7 % — SIGNIFICANT CHANGE UP (ref 13–44)
LYMPHOCYTES # BLD AUTO: 2.67 K/UL — SIGNIFICANT CHANGE UP (ref 1–3.3)
LYMPHOCYTES # BLD AUTO: 20.6 % — SIGNIFICANT CHANGE UP (ref 13–44)
LYMPHOCYTES # BLD AUTO: 21 % — SIGNIFICANT CHANGE UP (ref 13–44)
LYMPHOCYTES # BLD AUTO: 22.2 % — SIGNIFICANT CHANGE UP (ref 13–44)
LYMPHOCYTES # BLD AUTO: 23.8 % — SIGNIFICANT CHANGE UP (ref 13–44)
LYMPHOCYTES # BLD AUTO: 24.2 % — SIGNIFICANT CHANGE UP (ref 13–44)
LYMPHOCYTES # BLD AUTO: 24.6 % — SIGNIFICANT CHANGE UP (ref 13–44)
LYMPHOCYTES # BLD AUTO: 26.8 % — SIGNIFICANT CHANGE UP (ref 13–44)
LYMPHOCYTES # BLD AUTO: 31.3 % — SIGNIFICANT CHANGE UP (ref 13–44)
LYMPHOCYTES # BLD AUTO: 8.5 % — LOW (ref 13–44)
LYMPHOCYTES # BLD AUTO: 9.4 % — LOW (ref 13–44)
M PNEUMO DNA SPEC QL NAA+PROBE: SIGNIFICANT CHANGE UP
M PNEUMO DNA SPEC QL NAA+PROBE: SIGNIFICANT CHANGE UP
MACROCYTES BLD QL: SIGNIFICANT CHANGE UP
MACROCYTES BLD QL: SLIGHT — SIGNIFICANT CHANGE UP
MACROCYTES BLD QL: SLIGHT — SIGNIFICANT CHANGE UP
MAGNESIUM SERPL-MCNC: 1.3 MG/DL — LOW (ref 1.6–2.6)
MAGNESIUM SERPL-MCNC: 1.4 MG/DL — LOW (ref 1.6–2.6)
MAGNESIUM SERPL-MCNC: 1.5 MG/DL — LOW (ref 1.6–2.6)
MAGNESIUM SERPL-MCNC: 1.6 MG/DL — SIGNIFICANT CHANGE UP (ref 1.6–2.6)
MAGNESIUM SERPL-MCNC: 1.7 MG/DL — SIGNIFICANT CHANGE UP (ref 1.6–2.6)
MAGNESIUM SERPL-MCNC: 1.8 MG/DL — SIGNIFICANT CHANGE UP (ref 1.6–2.6)
MAGNESIUM SERPL-MCNC: 1.9 MG/DL — SIGNIFICANT CHANGE UP (ref 1.6–2.6)
MAGNESIUM SERPL-MCNC: 2 MG/DL — SIGNIFICANT CHANGE UP (ref 1.6–2.6)
MAGNESIUM SERPL-MCNC: 2.1 MG/DL — SIGNIFICANT CHANGE UP (ref 1.6–2.6)
MAGNESIUM SERPL-MCNC: 2.2 MG/DL — SIGNIFICANT CHANGE UP (ref 1.6–2.6)
MANUAL SMEAR VERIFICATION: SIGNIFICANT CHANGE UP
MCHC RBC-ENTMCNC: 29.1 PG — SIGNIFICANT CHANGE UP (ref 27–34)
MCHC RBC-ENTMCNC: 29.2 PG — SIGNIFICANT CHANGE UP (ref 27–34)
MCHC RBC-ENTMCNC: 29.3 GM/DL — LOW (ref 32–36)
MCHC RBC-ENTMCNC: 29.3 PG — SIGNIFICANT CHANGE UP (ref 27–34)
MCHC RBC-ENTMCNC: 29.4 GM/DL — LOW (ref 32–36)
MCHC RBC-ENTMCNC: 29.4 PG — SIGNIFICANT CHANGE UP (ref 27–34)
MCHC RBC-ENTMCNC: 29.5 GM/DL — LOW (ref 32–36)
MCHC RBC-ENTMCNC: 29.5 PG — SIGNIFICANT CHANGE UP (ref 27–34)
MCHC RBC-ENTMCNC: 29.6 GM/DL — LOW (ref 32–36)
MCHC RBC-ENTMCNC: 29.6 PG — SIGNIFICANT CHANGE UP (ref 27–34)
MCHC RBC-ENTMCNC: 29.7 GM/DL — LOW (ref 32–36)
MCHC RBC-ENTMCNC: 29.7 GM/DL — LOW (ref 32–36)
MCHC RBC-ENTMCNC: 29.7 PG — SIGNIFICANT CHANGE UP (ref 27–34)
MCHC RBC-ENTMCNC: 29.8 PG — SIGNIFICANT CHANGE UP (ref 27–34)
MCHC RBC-ENTMCNC: 29.9 GM/DL — LOW (ref 32–36)
MCHC RBC-ENTMCNC: 29.9 PG — SIGNIFICANT CHANGE UP (ref 27–34)
MCHC RBC-ENTMCNC: 30 GM/DL — LOW (ref 32–36)
MCHC RBC-ENTMCNC: 30 PG — SIGNIFICANT CHANGE UP (ref 27–34)
MCHC RBC-ENTMCNC: 30.1 GM/DL — LOW (ref 32–36)
MCHC RBC-ENTMCNC: 30.1 PG — SIGNIFICANT CHANGE UP (ref 27–34)
MCHC RBC-ENTMCNC: 30.2 GM/DL — LOW (ref 32–36)
MCHC RBC-ENTMCNC: 30.2 PG — SIGNIFICANT CHANGE UP (ref 27–34)
MCHC RBC-ENTMCNC: 30.3 GM/DL — LOW (ref 32–36)
MCHC RBC-ENTMCNC: 30.3 PG — SIGNIFICANT CHANGE UP (ref 27–34)
MCHC RBC-ENTMCNC: 30.3 PG — SIGNIFICANT CHANGE UP (ref 27–34)
MCHC RBC-ENTMCNC: 30.4 GM/DL — LOW (ref 32–36)
MCHC RBC-ENTMCNC: 30.4 GM/DL — LOW (ref 32–36)
MCHC RBC-ENTMCNC: 30.4 PG — SIGNIFICANT CHANGE UP (ref 27–34)
MCHC RBC-ENTMCNC: 30.5 GM/DL — LOW (ref 32–36)
MCHC RBC-ENTMCNC: 30.5 PG — SIGNIFICANT CHANGE UP (ref 27–34)
MCHC RBC-ENTMCNC: 30.6 PG — SIGNIFICANT CHANGE UP (ref 27–34)
MCHC RBC-ENTMCNC: 30.6 PG — SIGNIFICANT CHANGE UP (ref 27–34)
MCHC RBC-ENTMCNC: 30.8 GM/DL — LOW (ref 32–36)
MCHC RBC-ENTMCNC: 30.9 GM/DL — LOW (ref 32–36)
MCHC RBC-ENTMCNC: 31 GM/DL — LOW (ref 32–36)
MCHC RBC-ENTMCNC: 31.1 GM/DL — LOW (ref 32–36)
MCHC RBC-ENTMCNC: 31.1 GM/DL — LOW (ref 32–36)
MCHC RBC-ENTMCNC: 31.2 GM/DL — LOW (ref 32–36)
MCHC RBC-ENTMCNC: 31.3 GM/DL — LOW (ref 32–36)
MCHC RBC-ENTMCNC: 31.4 GM/DL — LOW (ref 32–36)
MCHC RBC-ENTMCNC: 31.7 GM/DL — LOW (ref 32–36)
MCHC RBC-ENTMCNC: 31.7 PG — SIGNIFICANT CHANGE UP (ref 27–34)
MCHC RBC-ENTMCNC: 31.8 G/DL — LOW (ref 32–36)
MCHC RBC-ENTMCNC: 31.8 GM/DL — LOW (ref 32–36)
MCHC RBC-ENTMCNC: 31.8 GM/DL — LOW (ref 32–36)
MCHC RBC-ENTMCNC: 31.8 PG — SIGNIFICANT CHANGE UP (ref 27–34)
MCHC RBC-ENTMCNC: 31.9 GM/DL — LOW (ref 32–36)
MCHC RBC-ENTMCNC: 31.9 GM/DL — LOW (ref 32–36)
MCHC RBC-ENTMCNC: 31.9 PG — SIGNIFICANT CHANGE UP (ref 27–34)
MCHC RBC-ENTMCNC: 32 GM/DL — SIGNIFICANT CHANGE UP (ref 32–36)
MCHC RBC-ENTMCNC: 32 PG — SIGNIFICANT CHANGE UP (ref 27–34)
MCHC RBC-ENTMCNC: 32 PG — SIGNIFICANT CHANGE UP (ref 27–34)
MCHC RBC-ENTMCNC: 32.1 GM/DL — SIGNIFICANT CHANGE UP (ref 32–36)
MCHC RBC-ENTMCNC: 32.1 PG — SIGNIFICANT CHANGE UP (ref 27–34)
MCHC RBC-ENTMCNC: 32.1 PG — SIGNIFICANT CHANGE UP (ref 27–34)
MCHC RBC-ENTMCNC: 32.2 GM/DL — SIGNIFICANT CHANGE UP (ref 32–36)
MCHC RBC-ENTMCNC: 32.3 PG — SIGNIFICANT CHANGE UP (ref 27–34)
MCHC RBC-ENTMCNC: 32.4 GM/DL — SIGNIFICANT CHANGE UP (ref 32–36)
MCHC RBC-ENTMCNC: 32.5 GM/DL — SIGNIFICANT CHANGE UP (ref 32–36)
MCHC RBC-ENTMCNC: 32.5 GM/DL — SIGNIFICANT CHANGE UP (ref 32–36)
MCHC RBC-ENTMCNC: 32.5 PG — SIGNIFICANT CHANGE UP (ref 27–34)
MCHC RBC-ENTMCNC: 32.6 G/DL — SIGNIFICANT CHANGE UP (ref 32–36)
MCHC RBC-ENTMCNC: 32.6 GM/DL — SIGNIFICANT CHANGE UP (ref 32–36)
MCHC RBC-ENTMCNC: 32.6 PG — SIGNIFICANT CHANGE UP (ref 27–34)
MCHC RBC-ENTMCNC: 32.7 GM/DL — SIGNIFICANT CHANGE UP (ref 32–36)
MCHC RBC-ENTMCNC: 32.7 PG — SIGNIFICANT CHANGE UP (ref 27–34)
MCHC RBC-ENTMCNC: 32.8 G/DL — SIGNIFICANT CHANGE UP (ref 32–36)
MCHC RBC-ENTMCNC: 32.8 G/DL — SIGNIFICANT CHANGE UP (ref 32–36)
MCHC RBC-ENTMCNC: 32.8 GM/DL — SIGNIFICANT CHANGE UP (ref 32–36)
MCHC RBC-ENTMCNC: 32.9 PG — SIGNIFICANT CHANGE UP (ref 27–34)
MCHC RBC-ENTMCNC: 33 G/DL — SIGNIFICANT CHANGE UP (ref 32–36)
MCHC RBC-ENTMCNC: 33 G/DL — SIGNIFICANT CHANGE UP (ref 32–36)
MCHC RBC-ENTMCNC: 33 GM/DL — SIGNIFICANT CHANGE UP (ref 32–36)
MCHC RBC-ENTMCNC: 33 PG — SIGNIFICANT CHANGE UP (ref 27–34)
MCHC RBC-ENTMCNC: 33.1 GM/DL — SIGNIFICANT CHANGE UP (ref 32–36)
MCHC RBC-ENTMCNC: 33.1 PG — SIGNIFICANT CHANGE UP (ref 27–34)
MCHC RBC-ENTMCNC: 33.2 GM/DL — SIGNIFICANT CHANGE UP (ref 32–36)
MCHC RBC-ENTMCNC: 33.2 PG — SIGNIFICANT CHANGE UP (ref 27–34)
MCHC RBC-ENTMCNC: 33.2 PG — SIGNIFICANT CHANGE UP (ref 27–34)
MCHC RBC-ENTMCNC: 33.4 PG — SIGNIFICANT CHANGE UP (ref 27–34)
MCHC RBC-ENTMCNC: 33.5 PG — SIGNIFICANT CHANGE UP (ref 27–34)
MCHC RBC-ENTMCNC: 33.6 G/DL — SIGNIFICANT CHANGE UP (ref 32–36)
MCHC RBC-ENTMCNC: 33.6 PG — SIGNIFICANT CHANGE UP (ref 27–34)
MCHC RBC-ENTMCNC: 33.7 GM/DL — SIGNIFICANT CHANGE UP (ref 32–36)
MCHC RBC-ENTMCNC: 33.7 PG — SIGNIFICANT CHANGE UP (ref 27–34)
MCHC RBC-ENTMCNC: 33.8 G/DL — SIGNIFICANT CHANGE UP (ref 32–36)
MCHC RBC-ENTMCNC: 34 G/DL — SIGNIFICANT CHANGE UP (ref 32–36)
MCHC RBC-ENTMCNC: 34.1 G/DL — SIGNIFICANT CHANGE UP (ref 32–36)
MCHC RBC-ENTMCNC: 34.2 G/DL — SIGNIFICANT CHANGE UP (ref 32–36)
MCHC RBC-ENTMCNC: 34.3 G/DL — SIGNIFICANT CHANGE UP (ref 32–36)
MCHC RBC-ENTMCNC: 34.4 G/DL — SIGNIFICANT CHANGE UP (ref 32–36)
MCHC RBC-ENTMCNC: 34.7 G/DL — SIGNIFICANT CHANGE UP (ref 32–36)
MCHC RBC-ENTMCNC: 34.9 G/DL — SIGNIFICANT CHANGE UP (ref 32–36)
MCV RBC AUTO: 100.3 FL — HIGH (ref 80–100)
MCV RBC AUTO: 100.7 FL — HIGH (ref 80–100)
MCV RBC AUTO: 100.7 FL — HIGH (ref 80–100)
MCV RBC AUTO: 100.8 FL — HIGH (ref 80–100)
MCV RBC AUTO: 101 FL — HIGH (ref 80–100)
MCV RBC AUTO: 101.2 FL — HIGH (ref 80–100)
MCV RBC AUTO: 101.2 FL — HIGH (ref 80–100)
MCV RBC AUTO: 101.3 FL — HIGH (ref 80–100)
MCV RBC AUTO: 101.4 FL — HIGH (ref 80–100)
MCV RBC AUTO: 101.5 FL — HIGH (ref 80–100)
MCV RBC AUTO: 101.6 FL — HIGH (ref 80–100)
MCV RBC AUTO: 101.7 FL — HIGH (ref 80–100)
MCV RBC AUTO: 101.7 FL — HIGH (ref 80–100)
MCV RBC AUTO: 101.8 FL — HIGH (ref 80–100)
MCV RBC AUTO: 102 FL — HIGH (ref 80–100)
MCV RBC AUTO: 102.1 FL — HIGH (ref 80–100)
MCV RBC AUTO: 102.2 FL — HIGH (ref 80–100)
MCV RBC AUTO: 102.3 FL — HIGH (ref 80–100)
MCV RBC AUTO: 102.9 FL — HIGH (ref 80–100)
MCV RBC AUTO: 103.3 FL — HIGH (ref 80–100)
MCV RBC AUTO: 103.7 FL — HIGH (ref 80–100)
MCV RBC AUTO: 103.9 FL — HIGH (ref 80–100)
MCV RBC AUTO: 91.6 FL — SIGNIFICANT CHANGE UP (ref 80–100)
MCV RBC AUTO: 91.6 FL — SIGNIFICANT CHANGE UP (ref 80–100)
MCV RBC AUTO: 92 FL — SIGNIFICANT CHANGE UP (ref 80–100)
MCV RBC AUTO: 93.1 FL — SIGNIFICANT CHANGE UP (ref 80–100)
MCV RBC AUTO: 93.2 FL — SIGNIFICANT CHANGE UP (ref 80–100)
MCV RBC AUTO: 93.2 FL — SIGNIFICANT CHANGE UP (ref 80–100)
MCV RBC AUTO: 93.3 FL — SIGNIFICANT CHANGE UP (ref 80–100)
MCV RBC AUTO: 93.4 FL — SIGNIFICANT CHANGE UP (ref 80–100)
MCV RBC AUTO: 93.7 FL — SIGNIFICANT CHANGE UP (ref 80–100)
MCV RBC AUTO: 93.7 FL — SIGNIFICANT CHANGE UP (ref 80–100)
MCV RBC AUTO: 93.8 FL — SIGNIFICANT CHANGE UP (ref 80–100)
MCV RBC AUTO: 94.3 FL — SIGNIFICANT CHANGE UP (ref 80–100)
MCV RBC AUTO: 94.5 FL — SIGNIFICANT CHANGE UP (ref 80–100)
MCV RBC AUTO: 94.6 FL — SIGNIFICANT CHANGE UP (ref 80–100)
MCV RBC AUTO: 94.6 FL — SIGNIFICANT CHANGE UP (ref 80–100)
MCV RBC AUTO: 94.7 FL — SIGNIFICANT CHANGE UP (ref 80–100)
MCV RBC AUTO: 94.8 FL — SIGNIFICANT CHANGE UP (ref 80–100)
MCV RBC AUTO: 94.9 FL — SIGNIFICANT CHANGE UP (ref 80–100)
MCV RBC AUTO: 95.1 FL — SIGNIFICANT CHANGE UP (ref 80–100)
MCV RBC AUTO: 95.4 FL — SIGNIFICANT CHANGE UP (ref 80–100)
MCV RBC AUTO: 95.5 FL — SIGNIFICANT CHANGE UP (ref 80–100)
MCV RBC AUTO: 95.6 FL — SIGNIFICANT CHANGE UP (ref 80–100)
MCV RBC AUTO: 95.6 FL — SIGNIFICANT CHANGE UP (ref 80–100)
MCV RBC AUTO: 95.8 FL — SIGNIFICANT CHANGE UP (ref 80–100)
MCV RBC AUTO: 96 FL — SIGNIFICANT CHANGE UP (ref 80–100)
MCV RBC AUTO: 96.3 FL — SIGNIFICANT CHANGE UP (ref 80–100)
MCV RBC AUTO: 96.4 FL — SIGNIFICANT CHANGE UP (ref 80–100)
MCV RBC AUTO: 96.5 FL — SIGNIFICANT CHANGE UP (ref 80–100)
MCV RBC AUTO: 96.6 FL — SIGNIFICANT CHANGE UP (ref 80–100)
MCV RBC AUTO: 96.9 FL — SIGNIFICANT CHANGE UP (ref 80–100)
MCV RBC AUTO: 96.9 FL — SIGNIFICANT CHANGE UP (ref 80–100)
MCV RBC AUTO: 97 FL — SIGNIFICANT CHANGE UP (ref 80–100)
MCV RBC AUTO: 97 FL — SIGNIFICANT CHANGE UP (ref 80–100)
MCV RBC AUTO: 97.1 FL — SIGNIFICANT CHANGE UP (ref 80–100)
MCV RBC AUTO: 97.2 FL — SIGNIFICANT CHANGE UP (ref 80–100)
MCV RBC AUTO: 97.5 FL — SIGNIFICANT CHANGE UP (ref 80–100)
MCV RBC AUTO: 97.6 FL — SIGNIFICANT CHANGE UP (ref 80–100)
MCV RBC AUTO: 97.9 FL — SIGNIFICANT CHANGE UP (ref 80–100)
MCV RBC AUTO: 98.1 FL — SIGNIFICANT CHANGE UP (ref 80–100)
MCV RBC AUTO: 98.1 FL — SIGNIFICANT CHANGE UP (ref 80–100)
MCV RBC AUTO: 98.8 FL — SIGNIFICANT CHANGE UP (ref 80–100)
MCV RBC AUTO: 98.9 FL — SIGNIFICANT CHANGE UP (ref 80–100)
MCV RBC AUTO: 99.1 FL — SIGNIFICANT CHANGE UP (ref 80–100)
MCV RBC AUTO: 99.2 FL — SIGNIFICANT CHANGE UP (ref 80–100)
MCV RBC AUTO: 99.3 FL — SIGNIFICANT CHANGE UP (ref 80–100)
MCV RBC AUTO: 99.4 FL — SIGNIFICANT CHANGE UP (ref 80–100)
MCV RBC AUTO: 99.5 FL — SIGNIFICANT CHANGE UP (ref 80–100)
MCV RBC AUTO: 99.5 FL — SIGNIFICANT CHANGE UP (ref 80–100)
MCV RBC AUTO: 99.6 FL — SIGNIFICANT CHANGE UP (ref 80–100)
MCV RBC AUTO: 99.7 FL — SIGNIFICANT CHANGE UP (ref 80–100)
METHADONE UR-MCNC: NEGATIVE — SIGNIFICANT CHANGE UP
METHOD TYPE: SIGNIFICANT CHANGE UP
METHOD TYPE: SIGNIFICANT CHANGE UP
MICROCYTES BLD QL: SLIGHT — SIGNIFICANT CHANGE UP
MONOCYTES # BLD AUTO: 0.06 K/UL — SIGNIFICANT CHANGE UP (ref 0–0.9)
MONOCYTES # BLD AUTO: 0.07 K/UL — SIGNIFICANT CHANGE UP (ref 0–0.9)
MONOCYTES # BLD AUTO: 0.15 K/UL — SIGNIFICANT CHANGE UP (ref 0–0.9)
MONOCYTES # BLD AUTO: 0.22 K/UL — SIGNIFICANT CHANGE UP (ref 0–0.9)
MONOCYTES # BLD AUTO: 0.26 K/UL — SIGNIFICANT CHANGE UP (ref 0–0.9)
MONOCYTES # BLD AUTO: 0.32 K/UL — SIGNIFICANT CHANGE UP (ref 0–0.9)
MONOCYTES # BLD AUTO: 0.39 K/UL — SIGNIFICANT CHANGE UP (ref 0–0.9)
MONOCYTES # BLD AUTO: 0.47 K/UL — SIGNIFICANT CHANGE UP (ref 0–0.9)
MONOCYTES # BLD AUTO: 0.55 K/UL — SIGNIFICANT CHANGE UP (ref 0–0.9)
MONOCYTES # BLD AUTO: 0.57 K/UL — SIGNIFICANT CHANGE UP (ref 0–0.9)
MONOCYTES # BLD AUTO: 0.78 K/UL — SIGNIFICANT CHANGE UP (ref 0–0.9)
MONOCYTES # BLD AUTO: 0.81 K/UL — SIGNIFICANT CHANGE UP (ref 0–0.9)
MONOCYTES # BLD AUTO: 0.85 K/UL — SIGNIFICANT CHANGE UP (ref 0–0.9)
MONOCYTES # BLD AUTO: 0.9 K/UL — SIGNIFICANT CHANGE UP (ref 0–0.9)
MONOCYTES # BLD AUTO: 0.9 K/UL — SIGNIFICANT CHANGE UP (ref 0–0.9)
MONOCYTES # BLD AUTO: 0.94 K/UL — HIGH (ref 0–0.9)
MONOCYTES # BLD AUTO: 1.03 K/UL — HIGH (ref 0–0.9)
MONOCYTES # BLD AUTO: 1.12 K/UL — HIGH (ref 0–0.9)
MONOCYTES # BLD AUTO: 1.21 K/UL — HIGH (ref 0–0.9)
MONOCYTES NFR BLD AUTO: 0.9 % — LOW (ref 2–14)
MONOCYTES NFR BLD AUTO: 1.4 % — LOW (ref 2–14)
MONOCYTES NFR BLD AUTO: 10.4 % — SIGNIFICANT CHANGE UP (ref 2–14)
MONOCYTES NFR BLD AUTO: 10.5 % — SIGNIFICANT CHANGE UP (ref 2–14)
MONOCYTES NFR BLD AUTO: 11.8 % — SIGNIFICANT CHANGE UP (ref 2–14)
MONOCYTES NFR BLD AUTO: 12.7 % — SIGNIFICANT CHANGE UP (ref 2–14)
MONOCYTES NFR BLD AUTO: 12.9 % — SIGNIFICANT CHANGE UP (ref 2–14)
MONOCYTES NFR BLD AUTO: 13.1 % — SIGNIFICANT CHANGE UP (ref 2–14)
MONOCYTES NFR BLD AUTO: 13.4 % — SIGNIFICANT CHANGE UP (ref 2–14)
MONOCYTES NFR BLD AUTO: 17.2 % — HIGH (ref 2–14)
MONOCYTES NFR BLD AUTO: 3 % — SIGNIFICANT CHANGE UP (ref 2–14)
MONOCYTES NFR BLD AUTO: 4.3 % — SIGNIFICANT CHANGE UP (ref 2–14)
MONOCYTES NFR BLD AUTO: 4.4 % — SIGNIFICANT CHANGE UP (ref 2–14)
MONOCYTES NFR BLD AUTO: 4.7 % — SIGNIFICANT CHANGE UP (ref 2–14)
MONOCYTES NFR BLD AUTO: 5.7 % — SIGNIFICANT CHANGE UP (ref 2–14)
MONOCYTES NFR BLD AUTO: 5.8 % — SIGNIFICANT CHANGE UP (ref 2–14)
MONOCYTES NFR BLD AUTO: 6.5 % — SIGNIFICANT CHANGE UP (ref 2–14)
MONOCYTES NFR BLD AUTO: 8 % — SIGNIFICANT CHANGE UP (ref 2–14)
MONOCYTES NFR BLD AUTO: 8.7 % — SIGNIFICANT CHANGE UP (ref 2–14)
MRSA PCR RESULT.: SIGNIFICANT CHANGE UP
MRSA PCR RESULT.: SIGNIFICANT CHANGE UP
NEUTROPHILS # BLD AUTO: 3.34 K/UL — SIGNIFICANT CHANGE UP (ref 1.8–7.4)
NEUTROPHILS # BLD AUTO: 3.85 K/UL — SIGNIFICANT CHANGE UP (ref 1.8–7.4)
NEUTROPHILS # BLD AUTO: 4.07 K/UL — SIGNIFICANT CHANGE UP (ref 1.8–7.4)
NEUTROPHILS # BLD AUTO: 4.14 K/UL — SIGNIFICANT CHANGE UP (ref 1.8–7.4)
NEUTROPHILS # BLD AUTO: 4.39 K/UL — SIGNIFICANT CHANGE UP (ref 1.8–7.4)
NEUTROPHILS # BLD AUTO: 4.45 K/UL — SIGNIFICANT CHANGE UP (ref 1.8–7.4)
NEUTROPHILS # BLD AUTO: 4.48 K/UL — SIGNIFICANT CHANGE UP (ref 1.8–7.4)
NEUTROPHILS # BLD AUTO: 4.6 K/UL — SIGNIFICANT CHANGE UP (ref 1.8–7.4)
NEUTROPHILS # BLD AUTO: 4.73 K/UL — SIGNIFICANT CHANGE UP (ref 1.8–7.4)
NEUTROPHILS # BLD AUTO: 4.82 K/UL — SIGNIFICANT CHANGE UP (ref 1.8–7.4)
NEUTROPHILS # BLD AUTO: 4.88 K/UL — SIGNIFICANT CHANGE UP (ref 1.8–7.4)
NEUTROPHILS # BLD AUTO: 5 K/UL — SIGNIFICANT CHANGE UP (ref 1.8–7.4)
NEUTROPHILS # BLD AUTO: 5.09 K/UL — SIGNIFICANT CHANGE UP (ref 1.8–7.4)
NEUTROPHILS # BLD AUTO: 5.1 K/UL — SIGNIFICANT CHANGE UP (ref 1.8–7.4)
NEUTROPHILS # BLD AUTO: 5.61 K/UL — SIGNIFICANT CHANGE UP (ref 1.8–7.4)
NEUTROPHILS # BLD AUTO: 6.2 K/UL — SIGNIFICANT CHANGE UP (ref 1.8–7.4)
NEUTROPHILS # BLD AUTO: 6.32 K/UL — SIGNIFICANT CHANGE UP (ref 1.8–7.4)
NEUTROPHILS # BLD AUTO: 6.58 K/UL — SIGNIFICANT CHANGE UP (ref 1.8–7.4)
NEUTROPHILS # BLD AUTO: 6.97 K/UL — SIGNIFICANT CHANGE UP (ref 1.8–7.4)
NEUTROPHILS NFR BLD AUTO: 52.4 % — SIGNIFICANT CHANGE UP (ref 43–77)
NEUTROPHILS NFR BLD AUTO: 54.9 % — SIGNIFICANT CHANGE UP (ref 43–77)
NEUTROPHILS NFR BLD AUTO: 57.9 % — SIGNIFICANT CHANGE UP (ref 43–77)
NEUTROPHILS NFR BLD AUTO: 61.7 % — SIGNIFICANT CHANGE UP (ref 43–77)
NEUTROPHILS NFR BLD AUTO: 63 % — SIGNIFICANT CHANGE UP (ref 43–77)
NEUTROPHILS NFR BLD AUTO: 64.8 % — SIGNIFICANT CHANGE UP (ref 43–77)
NEUTROPHILS NFR BLD AUTO: 65.8 % — SIGNIFICANT CHANGE UP (ref 43–77)
NEUTROPHILS NFR BLD AUTO: 67.7 % — SIGNIFICANT CHANGE UP (ref 43–77)
NEUTROPHILS NFR BLD AUTO: 70.9 % — SIGNIFICANT CHANGE UP (ref 43–77)
NEUTROPHILS NFR BLD AUTO: 72 % — SIGNIFICANT CHANGE UP (ref 43–77)
NEUTROPHILS NFR BLD AUTO: 72.9 % — SIGNIFICANT CHANGE UP (ref 43–77)
NEUTROPHILS NFR BLD AUTO: 74.1 % — SIGNIFICANT CHANGE UP (ref 43–77)
NEUTROPHILS NFR BLD AUTO: 75.5 % — SIGNIFICANT CHANGE UP (ref 43–77)
NEUTROPHILS NFR BLD AUTO: 76.1 % — SIGNIFICANT CHANGE UP (ref 43–77)
NEUTROPHILS NFR BLD AUTO: 78 % — HIGH (ref 43–77)
NEUTROPHILS NFR BLD AUTO: 81.6 % — HIGH (ref 43–77)
NEUTROPHILS NFR BLD AUTO: 85 % — HIGH (ref 43–77)
NEUTROPHILS NFR BLD AUTO: 85.1 % — HIGH (ref 43–77)
NEUTROPHILS NFR BLD AUTO: 85.2 % — HIGH (ref 43–77)
NEUTS BAND # BLD: 0.9 % — SIGNIFICANT CHANGE UP (ref 0–6)
NIGHT BLUE STAIN TISS: SIGNIFICANT CHANGE UP
NITRITE UR-MCNC: NEGATIVE — SIGNIFICANT CHANGE UP
NITRITE UR-MCNC: NEGATIVE — SIGNIFICANT CHANGE UP
NON HDL CHOLESTEROL: 40 MG/DL — SIGNIFICANT CHANGE UP
NRBC # BLD: 0 /100 WBCS — SIGNIFICANT CHANGE UP (ref 0–0)
NRBC # BLD: 0 /100 — SIGNIFICANT CHANGE UP (ref 0–0)
NRBC # BLD: SIGNIFICANT CHANGE UP /100 WBCS (ref 0–0)
NRBC # FLD: 0 K/UL — SIGNIFICANT CHANGE UP (ref 0–0)
NRBC # FLD: 0.02 K/UL — HIGH (ref 0–0)
NRBC # FLD: 0.04 K/UL — HIGH (ref 0–0)
NRBC # FLD: 0.06 K/UL — HIGH (ref 0–0)
NT-PROBNP SERPL-SCNC: HIGH PG/ML
NT-PROBNP SERPL-SCNC: HIGH PG/ML
OB PNL STL: NEGATIVE — SIGNIFICANT CHANGE UP
OPIATES UR-MCNC: POSITIVE — SIGNIFICANT CHANGE UP
ORGANISM # SPEC MICROSCOPIC CNT: SIGNIFICANT CHANGE UP
OVALOCYTES BLD QL SMEAR: SLIGHT — SIGNIFICANT CHANGE UP
PCO2 BLDA: 35 MMHG — SIGNIFICANT CHANGE UP (ref 32–46)
PCO2 BLDA: 57 MMHG — HIGH (ref 32–46)
PCO2 BLDV: 49 MMHG — SIGNIFICANT CHANGE UP (ref 42–55)
PCP SPEC-MCNC: SIGNIFICANT CHANGE UP
PCP UR-MCNC: NEGATIVE — SIGNIFICANT CHANGE UP
PH BLDA: 7.26 — LOW (ref 7.35–7.45)
PH BLDA: 7.47 — HIGH (ref 7.35–7.45)
PH BLDV: 7.4 — SIGNIFICANT CHANGE UP (ref 7.32–7.43)
PH UR: 6 — SIGNIFICANT CHANGE UP (ref 5–8)
PH UR: 6.5 — SIGNIFICANT CHANGE UP (ref 5–8)
PHOSPHATE SERPL-MCNC: 1.2 MG/DL — LOW (ref 2.5–4.5)
PHOSPHATE SERPL-MCNC: 1.4 MG/DL — LOW (ref 2.5–4.5)
PHOSPHATE SERPL-MCNC: 1.6 MG/DL — LOW (ref 2.5–4.5)
PHOSPHATE SERPL-MCNC: 1.7 MG/DL — LOW (ref 2.5–4.5)
PHOSPHATE SERPL-MCNC: 1.8 MG/DL — LOW (ref 2.5–4.5)
PHOSPHATE SERPL-MCNC: 2.2 MG/DL — LOW (ref 2.5–4.5)
PHOSPHATE SERPL-MCNC: 2.3 MG/DL — LOW (ref 2.5–4.5)
PHOSPHATE SERPL-MCNC: 2.4 MG/DL — LOW (ref 2.5–4.5)
PHOSPHATE SERPL-MCNC: 2.4 MG/DL — LOW (ref 2.5–4.5)
PHOSPHATE SERPL-MCNC: 2.6 MG/DL — SIGNIFICANT CHANGE UP (ref 2.5–4.5)
PHOSPHATE SERPL-MCNC: 2.7 MG/DL — SIGNIFICANT CHANGE UP (ref 2.5–4.5)
PHOSPHATE SERPL-MCNC: 2.7 MG/DL — SIGNIFICANT CHANGE UP (ref 2.5–4.5)
PHOSPHATE SERPL-MCNC: 2.8 MG/DL — SIGNIFICANT CHANGE UP (ref 2.5–4.5)
PHOSPHATE SERPL-MCNC: 2.9 MG/DL — SIGNIFICANT CHANGE UP (ref 2.5–4.5)
PHOSPHATE SERPL-MCNC: 3 MG/DL — SIGNIFICANT CHANGE UP (ref 2.5–4.5)
PHOSPHATE SERPL-MCNC: 3.1 MG/DL — SIGNIFICANT CHANGE UP (ref 2.5–4.5)
PHOSPHATE SERPL-MCNC: 3.1 MG/DL — SIGNIFICANT CHANGE UP (ref 2.5–4.5)
PHOSPHATE SERPL-MCNC: 3.2 MG/DL — SIGNIFICANT CHANGE UP (ref 2.5–4.5)
PHOSPHATE SERPL-MCNC: 3.2 MG/DL — SIGNIFICANT CHANGE UP (ref 2.5–4.5)
PHOSPHATE SERPL-MCNC: 3.3 MG/DL — SIGNIFICANT CHANGE UP (ref 2.5–4.5)
PHOSPHATE SERPL-MCNC: 3.3 MG/DL — SIGNIFICANT CHANGE UP (ref 2.5–4.5)
PHOSPHATE SERPL-MCNC: 3.4 MG/DL — SIGNIFICANT CHANGE UP (ref 2.5–4.5)
PHOSPHATE SERPL-MCNC: 3.5 MG/DL — SIGNIFICANT CHANGE UP (ref 2.5–4.5)
PHOSPHATE SERPL-MCNC: 3.6 MG/DL — SIGNIFICANT CHANGE UP (ref 2.5–4.5)
PHOSPHATE SERPL-MCNC: 3.7 MG/DL — SIGNIFICANT CHANGE UP (ref 2.5–4.5)
PHOSPHATE SERPL-MCNC: 3.8 MG/DL — SIGNIFICANT CHANGE UP (ref 2.5–4.5)
PHOSPHATE SERPL-MCNC: 3.8 MG/DL — SIGNIFICANT CHANGE UP (ref 2.5–4.5)
PHOSPHATE SERPL-MCNC: 3.9 MG/DL — SIGNIFICANT CHANGE UP (ref 2.5–4.5)
PHOSPHATE SERPL-MCNC: 4 MG/DL — SIGNIFICANT CHANGE UP (ref 2.5–4.5)
PHOSPHATE SERPL-MCNC: 4.1 MG/DL — SIGNIFICANT CHANGE UP (ref 2.5–4.5)
PHOSPHATE SERPL-MCNC: 4.4 MG/DL — SIGNIFICANT CHANGE UP (ref 2.5–4.5)
PHOSPHATE SERPL-MCNC: 4.5 MG/DL — SIGNIFICANT CHANGE UP (ref 2.5–4.5)
PHOSPHATE SERPL-MCNC: 4.7 MG/DL — HIGH (ref 2.5–4.5)
PHOSPHATE SERPL-MCNC: 4.9 MG/DL — HIGH (ref 2.5–4.5)
PHOSPHATE SERPL-MCNC: 5.2 MG/DL — HIGH (ref 2.5–4.5)
PHOSPHATE SERPL-MCNC: 5.4 MG/DL — HIGH (ref 2.5–4.5)
PHOSPHATE SERPL-MCNC: 5.5 MG/DL — HIGH (ref 2.5–4.5)
PHOSPHATE SERPL-MCNC: 5.7 MG/DL — HIGH (ref 2.5–4.5)
PHOSPHATE SERPL-MCNC: 6 MG/DL — HIGH (ref 2.5–4.5)
PHOSPHATE SERPL-MCNC: 6.1 MG/DL — HIGH (ref 2.5–4.5)
PHOSPHATE SERPL-MCNC: 6.1 MG/DL — HIGH (ref 2.5–4.5)
PHOSPHATE SERPL-MCNC: 6.2 MG/DL — HIGH (ref 2.5–4.5)
PHOSPHATE SERPL-MCNC: 6.5 MG/DL — HIGH (ref 2.5–4.5)
PHOSPHATE SERPL-MCNC: 6.7 MG/DL — HIGH (ref 2.5–4.5)
PHOSPHATE SERPL-MCNC: 6.9 MG/DL — HIGH (ref 2.5–4.5)
PHOSPHATE SERPL-MCNC: 7.2 MG/DL — HIGH (ref 2.5–4.5)
PHOSPHATE SERPL-MCNC: 7.7 MG/DL — HIGH (ref 2.5–4.5)
PLAT MORPH BLD: NORMAL — SIGNIFICANT CHANGE UP
PLAT MORPH BLD: SIGNIFICANT CHANGE UP
PLATELET # BLD AUTO: 105 K/UL — LOW (ref 150–400)
PLATELET # BLD AUTO: 109 K/UL — LOW (ref 150–400)
PLATELET # BLD AUTO: 110 K/UL — LOW (ref 150–400)
PLATELET # BLD AUTO: 115 K/UL — LOW (ref 150–400)
PLATELET # BLD AUTO: 125 K/UL — LOW (ref 150–400)
PLATELET # BLD AUTO: 128 K/UL — LOW (ref 150–400)
PLATELET # BLD AUTO: 138 K/UL — LOW (ref 150–400)
PLATELET # BLD AUTO: 145 K/UL — LOW (ref 150–400)
PLATELET # BLD AUTO: 153 K/UL — SIGNIFICANT CHANGE UP (ref 150–400)
PLATELET # BLD AUTO: 155 K/UL — SIGNIFICANT CHANGE UP (ref 150–400)
PLATELET # BLD AUTO: 158 K/UL — SIGNIFICANT CHANGE UP (ref 150–400)
PLATELET # BLD AUTO: 158 K/UL — SIGNIFICANT CHANGE UP (ref 150–400)
PLATELET # BLD AUTO: 174 K/UL — SIGNIFICANT CHANGE UP (ref 150–400)
PLATELET # BLD AUTO: 174 K/UL — SIGNIFICANT CHANGE UP (ref 150–400)
PLATELET # BLD AUTO: 177 K/UL — SIGNIFICANT CHANGE UP (ref 150–400)
PLATELET # BLD AUTO: 184 K/UL — SIGNIFICANT CHANGE UP (ref 150–400)
PLATELET # BLD AUTO: 188 K/UL — SIGNIFICANT CHANGE UP (ref 150–400)
PLATELET # BLD AUTO: 193 K/UL — SIGNIFICANT CHANGE UP (ref 150–400)
PLATELET # BLD AUTO: 194 K/UL — SIGNIFICANT CHANGE UP (ref 150–400)
PLATELET # BLD AUTO: 194 K/UL — SIGNIFICANT CHANGE UP (ref 150–400)
PLATELET # BLD AUTO: 196 K/UL — SIGNIFICANT CHANGE UP (ref 150–400)
PLATELET # BLD AUTO: 196 K/UL — SIGNIFICANT CHANGE UP (ref 150–400)
PLATELET # BLD AUTO: 199 K/UL — SIGNIFICANT CHANGE UP (ref 150–400)
PLATELET # BLD AUTO: 200 K/UL — SIGNIFICANT CHANGE UP (ref 150–400)
PLATELET # BLD AUTO: 200 K/UL — SIGNIFICANT CHANGE UP (ref 150–400)
PLATELET # BLD AUTO: 202 K/UL — SIGNIFICANT CHANGE UP (ref 150–400)
PLATELET # BLD AUTO: 206 K/UL — SIGNIFICANT CHANGE UP (ref 150–400)
PLATELET # BLD AUTO: 207 K/UL — SIGNIFICANT CHANGE UP (ref 150–400)
PLATELET # BLD AUTO: 208 K/UL — SIGNIFICANT CHANGE UP (ref 150–400)
PLATELET # BLD AUTO: 210 K/UL — SIGNIFICANT CHANGE UP (ref 150–400)
PLATELET # BLD AUTO: 215 K/UL — SIGNIFICANT CHANGE UP (ref 150–400)
PLATELET # BLD AUTO: 219 K/UL — SIGNIFICANT CHANGE UP (ref 150–400)
PLATELET # BLD AUTO: 220 K/UL — SIGNIFICANT CHANGE UP (ref 150–400)
PLATELET # BLD AUTO: 221 K/UL — SIGNIFICANT CHANGE UP (ref 150–400)
PLATELET # BLD AUTO: 222 K/UL — SIGNIFICANT CHANGE UP (ref 150–400)
PLATELET # BLD AUTO: 223 K/UL — SIGNIFICANT CHANGE UP (ref 150–400)
PLATELET # BLD AUTO: 230 K/UL — SIGNIFICANT CHANGE UP (ref 150–400)
PLATELET # BLD AUTO: 231 K/UL — SIGNIFICANT CHANGE UP (ref 150–400)
PLATELET # BLD AUTO: 232 K/UL — SIGNIFICANT CHANGE UP (ref 150–400)
PLATELET # BLD AUTO: 232 K/UL — SIGNIFICANT CHANGE UP (ref 150–400)
PLATELET # BLD AUTO: 235 K/UL — SIGNIFICANT CHANGE UP (ref 150–400)
PLATELET # BLD AUTO: 239 K/UL — SIGNIFICANT CHANGE UP (ref 150–400)
PLATELET # BLD AUTO: 239 K/UL — SIGNIFICANT CHANGE UP (ref 150–400)
PLATELET # BLD AUTO: 240 K/UL — SIGNIFICANT CHANGE UP (ref 150–400)
PLATELET # BLD AUTO: 242 K/UL — SIGNIFICANT CHANGE UP (ref 150–400)
PLATELET # BLD AUTO: 245 K/UL — SIGNIFICANT CHANGE UP (ref 150–400)
PLATELET # BLD AUTO: 246 K/UL — SIGNIFICANT CHANGE UP (ref 150–400)
PLATELET # BLD AUTO: 249 K/UL — SIGNIFICANT CHANGE UP (ref 150–400)
PLATELET # BLD AUTO: 251 K/UL — SIGNIFICANT CHANGE UP (ref 150–400)
PLATELET # BLD AUTO: 252 K/UL — SIGNIFICANT CHANGE UP (ref 150–400)
PLATELET # BLD AUTO: 256 K/UL — SIGNIFICANT CHANGE UP (ref 150–400)
PLATELET # BLD AUTO: 261 K/UL — SIGNIFICANT CHANGE UP (ref 150–400)
PLATELET # BLD AUTO: 261 K/UL — SIGNIFICANT CHANGE UP (ref 150–400)
PLATELET # BLD AUTO: 262 K/UL — SIGNIFICANT CHANGE UP (ref 150–400)
PLATELET # BLD AUTO: 262 K/UL — SIGNIFICANT CHANGE UP (ref 150–400)
PLATELET # BLD AUTO: 264 K/UL — SIGNIFICANT CHANGE UP (ref 150–400)
PLATELET # BLD AUTO: 268 K/UL — SIGNIFICANT CHANGE UP (ref 150–400)
PLATELET # BLD AUTO: 271 K/UL — SIGNIFICANT CHANGE UP (ref 150–400)
PLATELET # BLD AUTO: 271 K/UL — SIGNIFICANT CHANGE UP (ref 150–400)
PLATELET # BLD AUTO: 47 K/UL — LOW (ref 150–400)
PLATELET # BLD AUTO: 49 K/UL — LOW (ref 150–400)
PLATELET # BLD AUTO: 52 K/UL — LOW (ref 150–400)
PLATELET # BLD AUTO: 56 K/UL — LOW (ref 150–400)
PLATELET # BLD AUTO: 57 K/UL — LOW (ref 150–400)
PLATELET # BLD AUTO: 58 K/UL — LOW (ref 150–400)
PLATELET # BLD AUTO: 62 K/UL — LOW (ref 150–400)
PLATELET # BLD AUTO: 62 K/UL — LOW (ref 150–400)
PLATELET # BLD AUTO: 65 K/UL — LOW (ref 150–400)
PLATELET # BLD AUTO: 68 K/UL — LOW (ref 150–400)
PLATELET # BLD AUTO: 77 K/UL — LOW (ref 150–400)
PLATELET # BLD AUTO: 80 K/UL — LOW (ref 150–400)
PLATELET # BLD AUTO: 84 K/UL — LOW (ref 150–400)
PLATELET # BLD AUTO: 84 K/UL — LOW (ref 150–400)
PLATELET # BLD AUTO: 86 K/UL — LOW (ref 150–400)
PLATELET # BLD AUTO: 88 K/UL — LOW (ref 150–400)
PLATELET # BLD AUTO: 96 K/UL — LOW (ref 150–400)
PLATELET COUNT - ESTIMATE: ABNORMAL
PLATELET COUNT - ESTIMATE: NORMAL — SIGNIFICANT CHANGE UP
PO2 BLDA: 183 MMHG — HIGH (ref 83–108)
PO2 BLDA: 531 MMHG — HIGH (ref 83–108)
PO2 BLDV: 60 MMHG — HIGH (ref 25–45)
POIKILOCYTOSIS BLD QL AUTO: SIGNIFICANT CHANGE UP
POIKILOCYTOSIS BLD QL AUTO: SLIGHT — SIGNIFICANT CHANGE UP
POLYCHROMASIA BLD QL SMEAR: SLIGHT — SIGNIFICANT CHANGE UP
POTASSIUM BLDV-SCNC: 3.1 MMOL/L — LOW (ref 3.5–5.1)
POTASSIUM SERPL-MCNC: 2.6 MMOL/L — CRITICAL LOW (ref 3.5–5.3)
POTASSIUM SERPL-MCNC: 2.6 MMOL/L — CRITICAL LOW (ref 3.5–5.3)
POTASSIUM SERPL-MCNC: 2.7 MMOL/L — CRITICAL LOW (ref 3.5–5.3)
POTASSIUM SERPL-MCNC: 2.7 MMOL/L — CRITICAL LOW (ref 3.5–5.3)
POTASSIUM SERPL-MCNC: 2.8 MMOL/L — CRITICAL LOW (ref 3.5–5.3)
POTASSIUM SERPL-MCNC: 2.9 MMOL/L — CRITICAL LOW (ref 3.5–5.3)
POTASSIUM SERPL-MCNC: 3 MMOL/L — LOW (ref 3.5–5.3)
POTASSIUM SERPL-MCNC: 3.1 MMOL/L — LOW (ref 3.5–5.3)
POTASSIUM SERPL-MCNC: 3.2 MMOL/L — LOW (ref 3.5–5.3)
POTASSIUM SERPL-MCNC: 3.3 MMOL/L — LOW (ref 3.5–5.3)
POTASSIUM SERPL-MCNC: 3.4 MMOL/L — LOW (ref 3.5–5.3)
POTASSIUM SERPL-MCNC: 3.5 MMOL/L — SIGNIFICANT CHANGE UP (ref 3.5–5.3)
POTASSIUM SERPL-MCNC: 3.6 MMOL/L — SIGNIFICANT CHANGE UP (ref 3.5–5.3)
POTASSIUM SERPL-MCNC: 3.7 MMOL/L — SIGNIFICANT CHANGE UP (ref 3.5–5.3)
POTASSIUM SERPL-MCNC: 3.8 MMOL/L — SIGNIFICANT CHANGE UP (ref 3.5–5.3)
POTASSIUM SERPL-MCNC: 3.9 MMOL/L — SIGNIFICANT CHANGE UP (ref 3.5–5.3)
POTASSIUM SERPL-MCNC: 4 MMOL/L — SIGNIFICANT CHANGE UP (ref 3.5–5.3)
POTASSIUM SERPL-MCNC: 4 MMOL/L — SIGNIFICANT CHANGE UP (ref 3.5–5.3)
POTASSIUM SERPL-MCNC: 4.1 MMOL/L — SIGNIFICANT CHANGE UP (ref 3.5–5.3)
POTASSIUM SERPL-MCNC: 4.1 MMOL/L — SIGNIFICANT CHANGE UP (ref 3.5–5.3)
POTASSIUM SERPL-MCNC: 4.2 MMOL/L — SIGNIFICANT CHANGE UP (ref 3.5–5.3)
POTASSIUM SERPL-MCNC: 4.2 MMOL/L — SIGNIFICANT CHANGE UP (ref 3.5–5.3)
POTASSIUM SERPL-MCNC: 4.3 MMOL/L — SIGNIFICANT CHANGE UP (ref 3.5–5.3)
POTASSIUM SERPL-MCNC: 4.5 MMOL/L — SIGNIFICANT CHANGE UP (ref 3.5–5.3)
POTASSIUM SERPL-MCNC: 4.6 MMOL/L — SIGNIFICANT CHANGE UP (ref 3.5–5.3)
POTASSIUM SERPL-MCNC: 4.6 MMOL/L — SIGNIFICANT CHANGE UP (ref 3.5–5.3)
POTASSIUM SERPL-MCNC: 4.7 MMOL/L — SIGNIFICANT CHANGE UP (ref 3.5–5.3)
POTASSIUM SERPL-MCNC: 4.7 MMOL/L — SIGNIFICANT CHANGE UP (ref 3.5–5.3)
POTASSIUM SERPL-MCNC: 4.9 MMOL/L — SIGNIFICANT CHANGE UP (ref 3.5–5.3)
POTASSIUM SERPL-MCNC: 5.2 MMOL/L — SIGNIFICANT CHANGE UP (ref 3.5–5.3)
POTASSIUM SERPL-MCNC: 5.3 MMOL/L — SIGNIFICANT CHANGE UP (ref 3.5–5.3)
POTASSIUM SERPL-SCNC: 2.6 MMOL/L — CRITICAL LOW (ref 3.5–5.3)
POTASSIUM SERPL-SCNC: 2.6 MMOL/L — CRITICAL LOW (ref 3.5–5.3)
POTASSIUM SERPL-SCNC: 2.7 MMOL/L — CRITICAL LOW (ref 3.5–5.3)
POTASSIUM SERPL-SCNC: 2.7 MMOL/L — CRITICAL LOW (ref 3.5–5.3)
POTASSIUM SERPL-SCNC: 2.8 MMOL/L — CRITICAL LOW (ref 3.5–5.3)
POTASSIUM SERPL-SCNC: 2.9 MMOL/L — CRITICAL LOW (ref 3.5–5.3)
POTASSIUM SERPL-SCNC: 3 MMOL/L — LOW (ref 3.5–5.3)
POTASSIUM SERPL-SCNC: 3.1 MMOL/L — LOW (ref 3.5–5.3)
POTASSIUM SERPL-SCNC: 3.2 MMOL/L — LOW (ref 3.5–5.3)
POTASSIUM SERPL-SCNC: 3.3 MMOL/L — LOW (ref 3.5–5.3)
POTASSIUM SERPL-SCNC: 3.4 MMOL/L — LOW (ref 3.5–5.3)
POTASSIUM SERPL-SCNC: 3.5 MMOL/L — SIGNIFICANT CHANGE UP (ref 3.5–5.3)
POTASSIUM SERPL-SCNC: 3.6 MMOL/L — SIGNIFICANT CHANGE UP (ref 3.5–5.3)
POTASSIUM SERPL-SCNC: 3.7 MMOL/L — SIGNIFICANT CHANGE UP (ref 3.5–5.3)
POTASSIUM SERPL-SCNC: 3.8 MMOL/L — SIGNIFICANT CHANGE UP (ref 3.5–5.3)
POTASSIUM SERPL-SCNC: 3.9 MMOL/L — SIGNIFICANT CHANGE UP (ref 3.5–5.3)
POTASSIUM SERPL-SCNC: 4 MMOL/L — SIGNIFICANT CHANGE UP (ref 3.5–5.3)
POTASSIUM SERPL-SCNC: 4 MMOL/L — SIGNIFICANT CHANGE UP (ref 3.5–5.3)
POTASSIUM SERPL-SCNC: 4.1 MMOL/L — SIGNIFICANT CHANGE UP (ref 3.5–5.3)
POTASSIUM SERPL-SCNC: 4.1 MMOL/L — SIGNIFICANT CHANGE UP (ref 3.5–5.3)
POTASSIUM SERPL-SCNC: 4.2 MMOL/L — SIGNIFICANT CHANGE UP (ref 3.5–5.3)
POTASSIUM SERPL-SCNC: 4.2 MMOL/L — SIGNIFICANT CHANGE UP (ref 3.5–5.3)
POTASSIUM SERPL-SCNC: 4.3 MMOL/L — SIGNIFICANT CHANGE UP (ref 3.5–5.3)
POTASSIUM SERPL-SCNC: 4.5 MMOL/L — SIGNIFICANT CHANGE UP (ref 3.5–5.3)
POTASSIUM SERPL-SCNC: 4.6 MMOL/L — SIGNIFICANT CHANGE UP (ref 3.5–5.3)
POTASSIUM SERPL-SCNC: 4.6 MMOL/L — SIGNIFICANT CHANGE UP (ref 3.5–5.3)
POTASSIUM SERPL-SCNC: 4.7 MMOL/L — SIGNIFICANT CHANGE UP (ref 3.5–5.3)
POTASSIUM SERPL-SCNC: 4.7 MMOL/L — SIGNIFICANT CHANGE UP (ref 3.5–5.3)
POTASSIUM SERPL-SCNC: 4.9 MMOL/L — SIGNIFICANT CHANGE UP (ref 3.5–5.3)
POTASSIUM SERPL-SCNC: 5.2 MMOL/L — SIGNIFICANT CHANGE UP (ref 3.5–5.3)
POTASSIUM SERPL-SCNC: 5.3 MMOL/L — SIGNIFICANT CHANGE UP (ref 3.5–5.3)
PROT SERPL-MCNC: 4.3 G/DL — LOW (ref 6–8.3)
PROT SERPL-MCNC: 4.5 GM/DL — LOW (ref 6–8.3)
PROT SERPL-MCNC: 4.6 G/DL — LOW (ref 6–8.3)
PROT SERPL-MCNC: 4.7 G/DL — LOW (ref 6–8.3)
PROT SERPL-MCNC: 4.7 GM/DL — LOW (ref 6–8.3)
PROT SERPL-MCNC: 4.9 G/DL — LOW (ref 6–8.3)
PROT SERPL-MCNC: 4.9 G/DL — LOW (ref 6–8.3)
PROT SERPL-MCNC: 4.9 GM/DL — LOW (ref 6–8.3)
PROT SERPL-MCNC: 5 GM/DL — LOW (ref 6–8.3)
PROT SERPL-MCNC: 5 GM/DL — LOW (ref 6–8.3)
PROT SERPL-MCNC: 5.1 GM/DL — LOW (ref 6–8.3)
PROT SERPL-MCNC: 5.2 G/DL — LOW (ref 6–8.3)
PROT SERPL-MCNC: 5.2 G/DL — LOW (ref 6–8.3)
PROT SERPL-MCNC: 5.2 GM/DL — LOW (ref 6–8.3)
PROT SERPL-MCNC: 5.3 GM/DL — LOW (ref 6–8.3)
PROT SERPL-MCNC: 5.4 G/DL — LOW (ref 6–8.3)
PROT SERPL-MCNC: 5.5 G/DL — LOW (ref 6–8.3)
PROT SERPL-MCNC: 5.5 G/DL — LOW (ref 6–8.3)
PROT SERPL-MCNC: 5.6 G/DL — LOW (ref 6–8.3)
PROT SERPL-MCNC: 5.6 G/DL — LOW (ref 6–8.3)
PROT SERPL-MCNC: 5.6 GM/DL — LOW (ref 6–8.3)
PROT SERPL-MCNC: 5.8 G/DL — LOW (ref 6–8.3)
PROT SERPL-MCNC: 5.8 G/DL — LOW (ref 6–8.3)
PROT UR-MCNC: 100 MG/DL
PROT UR-MCNC: 100 MG/DL
PROTHROM AB SERPL-ACNC: 11.5 SEC — SIGNIFICANT CHANGE UP (ref 10.5–13.4)
PROTHROM AB SERPL-ACNC: 11.5 SEC — SIGNIFICANT CHANGE UP (ref 10.5–13.4)
PROTHROM AB SERPL-ACNC: 11.6 SEC — SIGNIFICANT CHANGE UP (ref 10.5–13.4)
PROTHROM AB SERPL-ACNC: 11.7 SEC — SIGNIFICANT CHANGE UP (ref 10.5–13.4)
PROTHROM AB SERPL-ACNC: 11.8 SEC — SIGNIFICANT CHANGE UP (ref 10.5–13.4)
PROTHROM AB SERPL-ACNC: 11.8 SEC — SIGNIFICANT CHANGE UP (ref 10.5–13.4)
PROTHROM AB SERPL-ACNC: 12 SEC — SIGNIFICANT CHANGE UP (ref 10.5–13.4)
PROTHROM AB SERPL-ACNC: 12 SEC — SIGNIFICANT CHANGE UP (ref 10.5–13.4)
PROTHROM AB SERPL-ACNC: 12.1 SEC — SIGNIFICANT CHANGE UP (ref 10.5–13.4)
PROTHROM AB SERPL-ACNC: 12.2 SEC — SIGNIFICANT CHANGE UP (ref 10.5–13.4)
PROTHROM AB SERPL-ACNC: 12.4 SEC — SIGNIFICANT CHANGE UP (ref 10.5–13.4)
PROTHROM AB SERPL-ACNC: 12.8 SEC — SIGNIFICANT CHANGE UP (ref 10.5–13.4)
PROTHROM AB SERPL-ACNC: 13.7 SEC — HIGH (ref 9.5–13)
PROTHROM AB SERPL-ACNC: 14 SEC — HIGH (ref 9.5–13)
PROTHROM AB SERPL-ACNC: 14.3 SEC — HIGH (ref 9.5–13)
PROTHROM AB SERPL-ACNC: 15.3 SEC — HIGH (ref 9.5–13)
PROTHROM AB SERPL-ACNC: 16.3 SEC — HIGH (ref 9.5–13)
PYRIDOXAL PHOS SERPL-MCNC: <1 UG/L — LOW (ref 3.4–65.2)
PYRIDOXAL PHOS SERPL-MCNC: SIGNIFICANT CHANGE UP UG/L
RAPID RVP RESULT: SIGNIFICANT CHANGE UP
RBC # BLD: 1.96 M/UL — LOW (ref 4.2–5.8)
RBC # BLD: 2.19 M/UL — LOW (ref 4.2–5.8)
RBC # BLD: 2.2 M/UL — LOW (ref 4.2–5.8)
RBC # BLD: 2.23 M/UL — LOW (ref 4.2–5.8)
RBC # BLD: 2.28 M/UL — LOW (ref 4.2–5.8)
RBC # BLD: 2.32 M/UL — LOW (ref 4.2–5.8)
RBC # BLD: 2.35 M/UL — LOW (ref 4.2–5.8)
RBC # BLD: 2.36 M/UL — LOW (ref 4.2–5.8)
RBC # BLD: 2.38 M/UL — LOW (ref 4.2–5.8)
RBC # BLD: 2.41 M/UL — LOW (ref 4.2–5.8)
RBC # BLD: 2.42 M/UL — LOW (ref 4.2–5.8)
RBC # BLD: 2.44 M/UL — LOW (ref 4.2–5.8)
RBC # BLD: 2.45 M/UL — LOW (ref 4.2–5.8)
RBC # BLD: 2.46 M/UL — LOW (ref 4.2–5.8)
RBC # BLD: 2.47 M/UL — LOW (ref 4.2–5.8)
RBC # BLD: 2.48 M/UL — LOW (ref 4.2–5.8)
RBC # BLD: 2.49 M/UL — LOW (ref 4.2–5.8)
RBC # BLD: 2.52 M/UL — LOW (ref 4.2–5.8)
RBC # BLD: 2.52 M/UL — LOW (ref 4.2–5.8)
RBC # BLD: 2.53 M/UL — LOW (ref 4.2–5.8)
RBC # BLD: 2.55 M/UL — LOW (ref 4.2–5.8)
RBC # BLD: 2.56 M/UL — LOW (ref 4.2–5.8)
RBC # BLD: 2.57 M/UL — LOW (ref 4.2–5.8)
RBC # BLD: 2.57 M/UL — LOW (ref 4.2–5.8)
RBC # BLD: 2.61 M/UL — LOW (ref 4.2–5.8)
RBC # BLD: 2.62 M/UL — LOW (ref 4.2–5.8)
RBC # BLD: 2.62 M/UL — LOW (ref 4.2–5.8)
RBC # BLD: 2.65 M/UL — LOW (ref 4.2–5.8)
RBC # BLD: 2.66 M/UL — LOW (ref 4.2–5.8)
RBC # BLD: 2.67 M/UL — LOW (ref 4.2–5.8)
RBC # BLD: 2.7 M/UL — LOW (ref 4.2–5.8)
RBC # BLD: 2.72 M/UL — LOW (ref 4.2–5.8)
RBC # BLD: 2.73 M/UL — LOW (ref 4.2–5.8)
RBC # BLD: 2.73 M/UL — LOW (ref 4.2–5.8)
RBC # BLD: 2.75 M/UL — LOW (ref 4.2–5.8)
RBC # BLD: 2.78 M/UL — LOW (ref 4.2–5.8)
RBC # BLD: 2.78 M/UL — LOW (ref 4.2–5.8)
RBC # BLD: 2.79 M/UL — LOW (ref 4.2–5.8)
RBC # BLD: 2.81 M/UL — LOW (ref 4.2–5.8)
RBC # BLD: 2.82 M/UL — LOW (ref 4.2–5.8)
RBC # BLD: 2.83 M/UL — LOW (ref 4.2–5.8)
RBC # BLD: 2.83 M/UL — LOW (ref 4.2–5.8)
RBC # BLD: 2.84 M/UL — LOW (ref 4.2–5.8)
RBC # BLD: 2.85 M/UL — LOW (ref 4.2–5.8)
RBC # BLD: 2.87 M/UL — LOW (ref 4.2–5.8)
RBC # BLD: 2.87 M/UL — LOW (ref 4.2–5.8)
RBC # BLD: 2.91 M/UL — LOW (ref 4.2–5.8)
RBC # BLD: 2.94 M/UL — LOW (ref 4.2–5.8)
RBC # BLD: 2.95 M/UL — LOW (ref 4.2–5.8)
RBC # BLD: 2.96 M/UL — LOW (ref 4.2–5.8)
RBC # BLD: 2.96 M/UL — LOW (ref 4.2–5.8)
RBC # BLD: 2.98 M/UL — LOW (ref 4.2–5.8)
RBC # BLD: 3.01 M/UL — LOW (ref 4.2–5.8)
RBC # BLD: 3.03 M/UL — LOW (ref 4.2–5.8)
RBC # BLD: 3.03 M/UL — LOW (ref 4.2–5.8)
RBC # BLD: 3.1 M/UL — LOW (ref 4.2–5.8)
RBC # BLD: 3.1 M/UL — LOW (ref 4.2–5.8)
RBC # BLD: 3.13 M/UL — LOW (ref 4.2–5.8)
RBC # BLD: 3.17 M/UL — LOW (ref 4.2–5.8)
RBC # BLD: 3.17 M/UL — LOW (ref 4.2–5.8)
RBC # BLD: 3.19 M/UL — LOW (ref 4.2–5.8)
RBC # BLD: 3.25 M/UL — LOW (ref 4.2–5.8)
RBC # BLD: 3.29 M/UL — LOW (ref 4.2–5.8)
RBC # BLD: 3.33 M/UL — LOW (ref 4.2–5.8)
RBC # BLD: 3.35 M/UL — LOW (ref 4.2–5.8)
RBC # BLD: 3.37 M/UL — LOW (ref 4.2–5.8)
RBC # BLD: 3.37 M/UL — LOW (ref 4.2–5.8)
RBC # BLD: 3.42 M/UL — LOW (ref 4.2–5.8)
RBC # BLD: 3.44 M/UL — LOW (ref 4.2–5.8)
RBC # BLD: 3.45 M/UL — LOW (ref 4.2–5.8)
RBC # BLD: 3.45 M/UL — LOW (ref 4.2–5.8)
RBC # BLD: 3.49 M/UL — LOW (ref 4.2–5.8)
RBC # BLD: 3.54 M/UL — LOW (ref 4.2–5.8)
RBC # BLD: 3.66 M/UL — LOW (ref 4.2–5.8)
RBC # BLD: 3.7 M/UL — LOW (ref 4.2–5.8)
RBC # FLD: 14.7 % — HIGH (ref 10.3–14.5)
RBC # FLD: 14.8 % — HIGH (ref 10.3–14.5)
RBC # FLD: 15 % — HIGH (ref 10.3–14.5)
RBC # FLD: 15.1 % — HIGH (ref 10.3–14.5)
RBC # FLD: 15.2 % — HIGH (ref 10.3–14.5)
RBC # FLD: 15.6 % — HIGH (ref 10.3–14.5)
RBC # FLD: 15.7 % — HIGH (ref 10.3–14.5)
RBC # FLD: 15.9 % — HIGH (ref 10.3–14.5)
RBC # FLD: 16.2 % — HIGH (ref 10.3–14.5)
RBC # FLD: 16.8 % — HIGH (ref 10.3–14.5)
RBC # FLD: 16.8 % — HIGH (ref 10.3–14.5)
RBC # FLD: 17 % — HIGH (ref 10.3–14.5)
RBC # FLD: 17.4 % — HIGH (ref 10.3–14.5)
RBC # FLD: 17.7 % — HIGH (ref 10.3–14.5)
RBC # FLD: 17.8 % — HIGH (ref 10.3–14.5)
RBC # FLD: 17.9 % — HIGH (ref 10.3–14.5)
RBC # FLD: 18.3 % — HIGH (ref 10.3–14.5)
RBC # FLD: 18.4 % — HIGH (ref 10.3–14.5)
RBC # FLD: 18.5 % — HIGH (ref 10.3–14.5)
RBC # FLD: 18.6 % — HIGH (ref 10.3–14.5)
RBC # FLD: 18.6 % — HIGH (ref 10.3–14.5)
RBC # FLD: 18.7 % — HIGH (ref 10.3–14.5)
RBC # FLD: 18.8 % — HIGH (ref 10.3–14.5)
RBC # FLD: 18.9 % — HIGH (ref 10.3–14.5)
RBC # FLD: 19 % — HIGH (ref 10.3–14.5)
RBC # FLD: 19.1 % — HIGH (ref 10.3–14.5)
RBC # FLD: 19.1 % — HIGH (ref 10.3–14.5)
RBC # FLD: 19.2 % — HIGH (ref 10.3–14.5)
RBC # FLD: 19.2 % — HIGH (ref 10.3–14.5)
RBC # FLD: 19.5 % — HIGH (ref 10.3–14.5)
RBC # FLD: 19.8 % — HIGH (ref 10.3–14.5)
RBC # FLD: 19.8 % — HIGH (ref 10.3–14.5)
RBC # FLD: 19.9 % — HIGH (ref 10.3–14.5)
RBC # FLD: 19.9 % — HIGH (ref 10.3–14.5)
RBC # FLD: 20.5 % — HIGH (ref 10.3–14.5)
RBC # FLD: 20.6 % — HIGH (ref 10.3–14.5)
RBC # FLD: 20.7 % — HIGH (ref 10.3–14.5)
RBC # FLD: 20.8 % — HIGH (ref 10.3–14.5)
RBC # FLD: 20.9 % — HIGH (ref 10.3–14.5)
RBC # FLD: 21 % — HIGH (ref 10.3–14.5)
RBC # FLD: 21.1 % — HIGH (ref 10.3–14.5)
RBC # FLD: 21.1 % — HIGH (ref 10.3–14.5)
RBC # FLD: 21.3 % — HIGH (ref 10.3–14.5)
RBC # FLD: 21.4 % — HIGH (ref 10.3–14.5)
RBC # FLD: 21.6 % — HIGH (ref 10.3–14.5)
RBC # FLD: 22.2 % — HIGH (ref 10.3–14.5)
RBC # FLD: 22.3 % — HIGH (ref 10.3–14.5)
RBC BLD AUTO: ABNORMAL
RBC CASTS # UR COMP ASSIST: ABNORMAL /HPF (ref 0–4)
RETICS #: 23 K/UL — LOW (ref 25–125)
RETICS/RBC NFR: 1.1 % — SIGNIFICANT CHANGE UP (ref 0.5–2.5)
RH IG SCN BLD-IMP: POSITIVE — SIGNIFICANT CHANGE UP
RSV RNA SPEC QL NAA+PROBE: SIGNIFICANT CHANGE UP
RSV RNA SPEC QL NAA+PROBE: SIGNIFICANT CHANGE UP
RV+EV RNA SPEC QL NAA+PROBE: SIGNIFICANT CHANGE UP
RV+EV RNA SPEC QL NAA+PROBE: SIGNIFICANT CHANGE UP
S AUREUS DNA NOSE QL NAA+PROBE: DETECTED
S AUREUS DNA NOSE QL NAA+PROBE: DETECTED
SALICYLATES SERPL-MCNC: <0.3 MG/DL — LOW (ref 15–30)
SAO2 % BLDA: 96.9 % — SIGNIFICANT CHANGE UP (ref 94–98)
SAO2 % BLDA: 98.5 % — HIGH (ref 94–98)
SAO2 % BLDV: 90.2 % — HIGH (ref 67–88)
SARS-COV-2 RNA SPEC QL NAA+PROBE: DETECTED
SARS-COV-2 RNA SPEC QL NAA+PROBE: DETECTED
SARS-COV-2 RNA SPEC QL NAA+PROBE: SIGNIFICANT CHANGE UP
SCHISTOCYTES BLD QL AUTO: SLIGHT — SIGNIFICANT CHANGE UP
SODIUM SERPL-SCNC: 134 MMOL/L — LOW (ref 135–145)
SODIUM SERPL-SCNC: 135 MMOL/L — SIGNIFICANT CHANGE UP (ref 135–145)
SODIUM SERPL-SCNC: 136 MMOL/L — SIGNIFICANT CHANGE UP (ref 135–145)
SODIUM SERPL-SCNC: 137 MMOL/L — SIGNIFICANT CHANGE UP (ref 135–145)
SODIUM SERPL-SCNC: 138 MMOL/L — SIGNIFICANT CHANGE UP (ref 135–145)
SODIUM SERPL-SCNC: 139 MMOL/L — SIGNIFICANT CHANGE UP (ref 135–145)
SODIUM SERPL-SCNC: 140 MMOL/L — SIGNIFICANT CHANGE UP (ref 135–145)
SODIUM SERPL-SCNC: 141 MMOL/L — SIGNIFICANT CHANGE UP (ref 135–145)
SODIUM SERPL-SCNC: 142 MMOL/L — SIGNIFICANT CHANGE UP (ref 135–145)
SODIUM SERPL-SCNC: 143 MMOL/L — SIGNIFICANT CHANGE UP (ref 135–145)
SODIUM SERPL-SCNC: 144 MMOL/L — SIGNIFICANT CHANGE UP (ref 135–145)
SODIUM SERPL-SCNC: 144 MMOL/L — SIGNIFICANT CHANGE UP (ref 135–145)
SODIUM SERPL-SCNC: 145 MMOL/L — SIGNIFICANT CHANGE UP (ref 135–145)
SODIUM SERPL-SCNC: 146 MMOL/L — HIGH (ref 135–145)
SODIUM SERPL-SCNC: 146 MMOL/L — HIGH (ref 135–145)
SODIUM SERPL-SCNC: 147 MMOL/L — HIGH (ref 135–145)
SP GR SPEC: 1.01 — SIGNIFICANT CHANGE UP (ref 1.01–1.02)
SP GR SPEC: 1.03 — SIGNIFICANT CHANGE UP (ref 1–1.03)
SPECIMEN SOURCE: SIGNIFICANT CHANGE UP
SURGICAL PATHOLOGY STUDY: SIGNIFICANT CHANGE UP
T PALLIDUM AB TITR SER: NEGATIVE — SIGNIFICANT CHANGE UP
T PALLIDUM AB TITR SER: NEGATIVE — SIGNIFICANT CHANGE UP
TARGETS BLD QL SMEAR: SLIGHT — SIGNIFICANT CHANGE UP
THC UR QL: NEGATIVE — SIGNIFICANT CHANGE UP
TIBC SERPL-MCNC: 84 UG/DL — LOW (ref 220–430)
TIBC SERPL-MCNC: <125 UG/DL — LOW (ref 220–430)
TIBC SERPL-MCNC: SIGNIFICANT CHANGE UP UG/DL (ref 220–430)
TIBC SERPL-MCNC: SIGNIFICANT CHANGE UP UG/DL (ref 220–430)
TOXICOLOGY SCREEN, DRUGS OF ABUSE, SERUM RESULT: SIGNIFICANT CHANGE UP
TRIGL SERPL-MCNC: 62 MG/DL — SIGNIFICANT CHANGE UP
TROPONIN I, HIGH SENSITIVITY RESULT: 12.2 NG/L — SIGNIFICANT CHANGE UP
TROPONIN T, HIGH SENSITIVITY RESULT: 131 NG/L — CRITICAL HIGH
TSH SERPL-MCNC: 1.19 UIU/ML — SIGNIFICANT CHANGE UP (ref 0.27–4.2)
TSH SERPL-MCNC: 1.64 UIU/ML — SIGNIFICANT CHANGE UP (ref 0.36–3.74)
UFH PPP CHRO-ACNC: 0.05 IU/ML — LOW (ref 0.3–0.7)
UIBC SERPL-MCNC: 39 UG/DL — LOW (ref 110–370)
UIBC SERPL-MCNC: <20 UG/DL — LOW (ref 110–370)
UIBC SERPL-MCNC: <30 UG/DL — LOW (ref 110–370)
UIBC SERPL-MCNC: <30 UG/DL — LOW (ref 110–370)
UROBILINOGEN FLD QL: 0.2 MG/DL — SIGNIFICANT CHANGE UP (ref 0.2–1)
UROBILINOGEN FLD QL: NEGATIVE MG/DL — SIGNIFICANT CHANGE UP
VARIANT LYMPHS # BLD: 0.9 % — SIGNIFICANT CHANGE UP (ref 0–6)
VARIANT LYMPHS # BLD: 2.7 % — SIGNIFICANT CHANGE UP (ref 0–6)
VIT B12 SERPL-MCNC: 1299 PG/ML — HIGH (ref 200–900)
VIT B12 SERPL-MCNC: 1325 PG/ML — HIGH (ref 232–1245)
VIT B12 SERPL-MCNC: 1573 PG/ML — HIGH (ref 200–900)
VIT B12 SERPL-MCNC: 1777 PG/ML — HIGH (ref 200–900)
WBC # BLD: 10.1 K/UL — SIGNIFICANT CHANGE UP (ref 3.8–10.5)
WBC # BLD: 10.53 K/UL — HIGH (ref 3.8–10.5)
WBC # BLD: 10.6 K/UL — HIGH (ref 3.8–10.5)
WBC # BLD: 10.7 K/UL — HIGH (ref 3.8–10.5)
WBC # BLD: 10.89 K/UL — HIGH (ref 3.8–10.5)
WBC # BLD: 11.65 K/UL — HIGH (ref 3.8–10.5)
WBC # BLD: 11.79 K/UL — HIGH (ref 3.8–10.5)
WBC # BLD: 4.39 K/UL — SIGNIFICANT CHANGE UP (ref 3.8–10.5)
WBC # BLD: 4.43 K/UL — SIGNIFICANT CHANGE UP (ref 3.8–10.5)
WBC # BLD: 4.49 K/UL — SIGNIFICANT CHANGE UP (ref 3.8–10.5)
WBC # BLD: 4.71 K/UL — SIGNIFICANT CHANGE UP (ref 3.8–10.5)
WBC # BLD: 4.87 K/UL — SIGNIFICANT CHANGE UP (ref 3.8–10.5)
WBC # BLD: 4.99 K/UL — SIGNIFICANT CHANGE UP (ref 3.8–10.5)
WBC # BLD: 5.03 K/UL — SIGNIFICANT CHANGE UP (ref 3.8–10.5)
WBC # BLD: 5.13 K/UL — SIGNIFICANT CHANGE UP (ref 3.8–10.5)
WBC # BLD: 5.15 K/UL — SIGNIFICANT CHANGE UP (ref 3.8–10.5)
WBC # BLD: 5.17 K/UL — SIGNIFICANT CHANGE UP (ref 3.8–10.5)
WBC # BLD: 5.4 K/UL — SIGNIFICANT CHANGE UP (ref 3.8–10.5)
WBC # BLD: 5.57 K/UL — SIGNIFICANT CHANGE UP (ref 3.8–10.5)
WBC # BLD: 5.63 K/UL — SIGNIFICANT CHANGE UP (ref 3.8–10.5)
WBC # BLD: 5.65 K/UL — SIGNIFICANT CHANGE UP (ref 3.8–10.5)
WBC # BLD: 5.74 K/UL — SIGNIFICANT CHANGE UP (ref 3.8–10.5)
WBC # BLD: 5.92 K/UL — SIGNIFICANT CHANGE UP (ref 3.8–10.5)
WBC # BLD: 5.92 K/UL — SIGNIFICANT CHANGE UP (ref 3.8–10.5)
WBC # BLD: 5.94 K/UL — SIGNIFICANT CHANGE UP (ref 3.8–10.5)
WBC # BLD: 5.98 K/UL — SIGNIFICANT CHANGE UP (ref 3.8–10.5)
WBC # BLD: 5.98 K/UL — SIGNIFICANT CHANGE UP (ref 3.8–10.5)
WBC # BLD: 6.03 K/UL — SIGNIFICANT CHANGE UP (ref 3.8–10.5)
WBC # BLD: 6.31 K/UL — SIGNIFICANT CHANGE UP (ref 3.8–10.5)
WBC # BLD: 6.37 K/UL — SIGNIFICANT CHANGE UP (ref 3.8–10.5)
WBC # BLD: 6.41 K/UL — SIGNIFICANT CHANGE UP (ref 3.8–10.5)
WBC # BLD: 6.43 K/UL — SIGNIFICANT CHANGE UP (ref 3.8–10.5)
WBC # BLD: 6.48 K/UL — SIGNIFICANT CHANGE UP (ref 3.8–10.5)
WBC # BLD: 6.5 K/UL — SIGNIFICANT CHANGE UP (ref 3.8–10.5)
WBC # BLD: 6.51 K/UL — SIGNIFICANT CHANGE UP (ref 3.8–10.5)
WBC # BLD: 6.7 K/UL — SIGNIFICANT CHANGE UP (ref 3.8–10.5)
WBC # BLD: 6.7 K/UL — SIGNIFICANT CHANGE UP (ref 3.8–10.5)
WBC # BLD: 6.88 K/UL — SIGNIFICANT CHANGE UP (ref 3.8–10.5)
WBC # BLD: 6.9 K/UL — SIGNIFICANT CHANGE UP (ref 3.8–10.5)
WBC # BLD: 6.91 K/UL — SIGNIFICANT CHANGE UP (ref 3.8–10.5)
WBC # BLD: 6.95 K/UL — SIGNIFICANT CHANGE UP (ref 3.8–10.5)
WBC # BLD: 7.02 K/UL — SIGNIFICANT CHANGE UP (ref 3.8–10.5)
WBC # BLD: 7.06 K/UL — SIGNIFICANT CHANGE UP (ref 3.8–10.5)
WBC # BLD: 7.1 K/UL — SIGNIFICANT CHANGE UP (ref 3.8–10.5)
WBC # BLD: 7.11 K/UL — SIGNIFICANT CHANGE UP (ref 3.8–10.5)
WBC # BLD: 7.19 K/UL — SIGNIFICANT CHANGE UP (ref 3.8–10.5)
WBC # BLD: 7.21 K/UL — SIGNIFICANT CHANGE UP (ref 3.8–10.5)
WBC # BLD: 7.26 K/UL — SIGNIFICANT CHANGE UP (ref 3.8–10.5)
WBC # BLD: 7.29 K/UL — SIGNIFICANT CHANGE UP (ref 3.8–10.5)
WBC # BLD: 7.3 K/UL — SIGNIFICANT CHANGE UP (ref 3.8–10.5)
WBC # BLD: 7.38 K/UL — SIGNIFICANT CHANGE UP (ref 3.8–10.5)
WBC # BLD: 7.43 K/UL — SIGNIFICANT CHANGE UP (ref 3.8–10.5)
WBC # BLD: 7.49 K/UL — SIGNIFICANT CHANGE UP (ref 3.8–10.5)
WBC # BLD: 7.51 K/UL — SIGNIFICANT CHANGE UP (ref 3.8–10.5)
WBC # BLD: 7.52 K/UL — SIGNIFICANT CHANGE UP (ref 3.8–10.5)
WBC # BLD: 7.52 K/UL — SIGNIFICANT CHANGE UP (ref 3.8–10.5)
WBC # BLD: 7.68 K/UL — SIGNIFICANT CHANGE UP (ref 3.8–10.5)
WBC # BLD: 7.94 K/UL — SIGNIFICANT CHANGE UP (ref 3.8–10.5)
WBC # BLD: 8.09 K/UL — SIGNIFICANT CHANGE UP (ref 3.8–10.5)
WBC # BLD: 8.19 K/UL — SIGNIFICANT CHANGE UP (ref 3.8–10.5)
WBC # BLD: 8.27 K/UL — SIGNIFICANT CHANGE UP (ref 3.8–10.5)
WBC # BLD: 8.46 K/UL — SIGNIFICANT CHANGE UP (ref 3.8–10.5)
WBC # BLD: 8.54 K/UL — SIGNIFICANT CHANGE UP (ref 3.8–10.5)
WBC # BLD: 8.6 K/UL — SIGNIFICANT CHANGE UP (ref 3.8–10.5)
WBC # BLD: 8.61 K/UL — SIGNIFICANT CHANGE UP (ref 3.8–10.5)
WBC # BLD: 8.62 K/UL — SIGNIFICANT CHANGE UP (ref 3.8–10.5)
WBC # BLD: 8.72 K/UL — SIGNIFICANT CHANGE UP (ref 3.8–10.5)
WBC # BLD: 8.75 K/UL — SIGNIFICANT CHANGE UP (ref 3.8–10.5)
WBC # BLD: 8.96 K/UL — SIGNIFICANT CHANGE UP (ref 3.8–10.5)
WBC # BLD: 8.97 K/UL — SIGNIFICANT CHANGE UP (ref 3.8–10.5)
WBC # BLD: 9.21 K/UL — SIGNIFICANT CHANGE UP (ref 3.8–10.5)
WBC # BLD: 9.3 K/UL — SIGNIFICANT CHANGE UP (ref 3.8–10.5)
WBC # BLD: 9.34 K/UL — SIGNIFICANT CHANGE UP (ref 3.8–10.5)
WBC # BLD: 9.63 K/UL — SIGNIFICANT CHANGE UP (ref 3.8–10.5)
WBC # BLD: 9.73 K/UL — SIGNIFICANT CHANGE UP (ref 3.8–10.5)
WBC # BLD: 9.91 K/UL — SIGNIFICANT CHANGE UP (ref 3.8–10.5)
WBC # FLD AUTO: 10.1 K/UL — SIGNIFICANT CHANGE UP (ref 3.8–10.5)
WBC # FLD AUTO: 10.53 K/UL — HIGH (ref 3.8–10.5)
WBC # FLD AUTO: 10.6 K/UL — HIGH (ref 3.8–10.5)
WBC # FLD AUTO: 10.7 K/UL — HIGH (ref 3.8–10.5)
WBC # FLD AUTO: 10.89 K/UL — HIGH (ref 3.8–10.5)
WBC # FLD AUTO: 11.65 K/UL — HIGH (ref 3.8–10.5)
WBC # FLD AUTO: 11.79 K/UL — HIGH (ref 3.8–10.5)
WBC # FLD AUTO: 4.39 K/UL — SIGNIFICANT CHANGE UP (ref 3.8–10.5)
WBC # FLD AUTO: 4.43 K/UL — SIGNIFICANT CHANGE UP (ref 3.8–10.5)
WBC # FLD AUTO: 4.49 K/UL — SIGNIFICANT CHANGE UP (ref 3.8–10.5)
WBC # FLD AUTO: 4.71 K/UL — SIGNIFICANT CHANGE UP (ref 3.8–10.5)
WBC # FLD AUTO: 4.87 K/UL — SIGNIFICANT CHANGE UP (ref 3.8–10.5)
WBC # FLD AUTO: 4.99 K/UL — SIGNIFICANT CHANGE UP (ref 3.8–10.5)
WBC # FLD AUTO: 5.03 K/UL — SIGNIFICANT CHANGE UP (ref 3.8–10.5)
WBC # FLD AUTO: 5.13 K/UL — SIGNIFICANT CHANGE UP (ref 3.8–10.5)
WBC # FLD AUTO: 5.15 K/UL — SIGNIFICANT CHANGE UP (ref 3.8–10.5)
WBC # FLD AUTO: 5.17 K/UL — SIGNIFICANT CHANGE UP (ref 3.8–10.5)
WBC # FLD AUTO: 5.4 K/UL — SIGNIFICANT CHANGE UP (ref 3.8–10.5)
WBC # FLD AUTO: 5.57 K/UL — SIGNIFICANT CHANGE UP (ref 3.8–10.5)
WBC # FLD AUTO: 5.63 K/UL — SIGNIFICANT CHANGE UP (ref 3.8–10.5)
WBC # FLD AUTO: 5.65 K/UL — SIGNIFICANT CHANGE UP (ref 3.8–10.5)
WBC # FLD AUTO: 5.74 K/UL — SIGNIFICANT CHANGE UP (ref 3.8–10.5)
WBC # FLD AUTO: 5.92 K/UL — SIGNIFICANT CHANGE UP (ref 3.8–10.5)
WBC # FLD AUTO: 5.92 K/UL — SIGNIFICANT CHANGE UP (ref 3.8–10.5)
WBC # FLD AUTO: 5.94 K/UL — SIGNIFICANT CHANGE UP (ref 3.8–10.5)
WBC # FLD AUTO: 5.98 K/UL — SIGNIFICANT CHANGE UP (ref 3.8–10.5)
WBC # FLD AUTO: 5.98 K/UL — SIGNIFICANT CHANGE UP (ref 3.8–10.5)
WBC # FLD AUTO: 6.03 K/UL — SIGNIFICANT CHANGE UP (ref 3.8–10.5)
WBC # FLD AUTO: 6.31 K/UL — SIGNIFICANT CHANGE UP (ref 3.8–10.5)
WBC # FLD AUTO: 6.37 K/UL — SIGNIFICANT CHANGE UP (ref 3.8–10.5)
WBC # FLD AUTO: 6.41 K/UL — SIGNIFICANT CHANGE UP (ref 3.8–10.5)
WBC # FLD AUTO: 6.43 K/UL — SIGNIFICANT CHANGE UP (ref 3.8–10.5)
WBC # FLD AUTO: 6.48 K/UL — SIGNIFICANT CHANGE UP (ref 3.8–10.5)
WBC # FLD AUTO: 6.5 K/UL — SIGNIFICANT CHANGE UP (ref 3.8–10.5)
WBC # FLD AUTO: 6.51 K/UL — SIGNIFICANT CHANGE UP (ref 3.8–10.5)
WBC # FLD AUTO: 6.7 K/UL — SIGNIFICANT CHANGE UP (ref 3.8–10.5)
WBC # FLD AUTO: 6.7 K/UL — SIGNIFICANT CHANGE UP (ref 3.8–10.5)
WBC # FLD AUTO: 6.88 K/UL — SIGNIFICANT CHANGE UP (ref 3.8–10.5)
WBC # FLD AUTO: 6.9 K/UL — SIGNIFICANT CHANGE UP (ref 3.8–10.5)
WBC # FLD AUTO: 6.91 K/UL — SIGNIFICANT CHANGE UP (ref 3.8–10.5)
WBC # FLD AUTO: 6.95 K/UL — SIGNIFICANT CHANGE UP (ref 3.8–10.5)
WBC # FLD AUTO: 7.02 K/UL — SIGNIFICANT CHANGE UP (ref 3.8–10.5)
WBC # FLD AUTO: 7.06 K/UL — SIGNIFICANT CHANGE UP (ref 3.8–10.5)
WBC # FLD AUTO: 7.1 K/UL — SIGNIFICANT CHANGE UP (ref 3.8–10.5)
WBC # FLD AUTO: 7.11 K/UL — SIGNIFICANT CHANGE UP (ref 3.8–10.5)
WBC # FLD AUTO: 7.19 K/UL — SIGNIFICANT CHANGE UP (ref 3.8–10.5)
WBC # FLD AUTO: 7.21 K/UL — SIGNIFICANT CHANGE UP (ref 3.8–10.5)
WBC # FLD AUTO: 7.26 K/UL — SIGNIFICANT CHANGE UP (ref 3.8–10.5)
WBC # FLD AUTO: 7.29 K/UL — SIGNIFICANT CHANGE UP (ref 3.8–10.5)
WBC # FLD AUTO: 7.3 K/UL — SIGNIFICANT CHANGE UP (ref 3.8–10.5)
WBC # FLD AUTO: 7.38 K/UL — SIGNIFICANT CHANGE UP (ref 3.8–10.5)
WBC # FLD AUTO: 7.43 K/UL — SIGNIFICANT CHANGE UP (ref 3.8–10.5)
WBC # FLD AUTO: 7.49 K/UL — SIGNIFICANT CHANGE UP (ref 3.8–10.5)
WBC # FLD AUTO: 7.51 K/UL — SIGNIFICANT CHANGE UP (ref 3.8–10.5)
WBC # FLD AUTO: 7.52 K/UL — SIGNIFICANT CHANGE UP (ref 3.8–10.5)
WBC # FLD AUTO: 7.52 K/UL — SIGNIFICANT CHANGE UP (ref 3.8–10.5)
WBC # FLD AUTO: 7.68 K/UL — SIGNIFICANT CHANGE UP (ref 3.8–10.5)
WBC # FLD AUTO: 7.94 K/UL — SIGNIFICANT CHANGE UP (ref 3.8–10.5)
WBC # FLD AUTO: 8.09 K/UL — SIGNIFICANT CHANGE UP (ref 3.8–10.5)
WBC # FLD AUTO: 8.19 K/UL — SIGNIFICANT CHANGE UP (ref 3.8–10.5)
WBC # FLD AUTO: 8.27 K/UL — SIGNIFICANT CHANGE UP (ref 3.8–10.5)
WBC # FLD AUTO: 8.46 K/UL — SIGNIFICANT CHANGE UP (ref 3.8–10.5)
WBC # FLD AUTO: 8.54 K/UL — SIGNIFICANT CHANGE UP (ref 3.8–10.5)
WBC # FLD AUTO: 8.6 K/UL — SIGNIFICANT CHANGE UP (ref 3.8–10.5)
WBC # FLD AUTO: 8.61 K/UL — SIGNIFICANT CHANGE UP (ref 3.8–10.5)
WBC # FLD AUTO: 8.62 K/UL — SIGNIFICANT CHANGE UP (ref 3.8–10.5)
WBC # FLD AUTO: 8.72 K/UL — SIGNIFICANT CHANGE UP (ref 3.8–10.5)
WBC # FLD AUTO: 8.75 K/UL — SIGNIFICANT CHANGE UP (ref 3.8–10.5)
WBC # FLD AUTO: 8.96 K/UL — SIGNIFICANT CHANGE UP (ref 3.8–10.5)
WBC # FLD AUTO: 8.97 K/UL — SIGNIFICANT CHANGE UP (ref 3.8–10.5)
WBC # FLD AUTO: 9.21 K/UL — SIGNIFICANT CHANGE UP (ref 3.8–10.5)
WBC # FLD AUTO: 9.3 K/UL — SIGNIFICANT CHANGE UP (ref 3.8–10.5)
WBC # FLD AUTO: 9.34 K/UL — SIGNIFICANT CHANGE UP (ref 3.8–10.5)
WBC # FLD AUTO: 9.63 K/UL — SIGNIFICANT CHANGE UP (ref 3.8–10.5)
WBC # FLD AUTO: 9.73 K/UL — SIGNIFICANT CHANGE UP (ref 3.8–10.5)
WBC # FLD AUTO: 9.91 K/UL — SIGNIFICANT CHANGE UP (ref 3.8–10.5)
WBC UR QL: SIGNIFICANT CHANGE UP

## 2023-01-01 PROCEDURE — 99232 SBSQ HOSP IP/OBS MODERATE 35: CPT | Mod: GC

## 2023-01-01 PROCEDURE — 99232 SBSQ HOSP IP/OBS MODERATE 35: CPT

## 2023-01-01 PROCEDURE — 99253 IP/OBS CNSLTJ NEW/EST LOW 45: CPT

## 2023-01-01 PROCEDURE — 99204 OFFICE O/P NEW MOD 45 MIN: CPT | Mod: 95

## 2023-01-01 PROCEDURE — 99443: CPT

## 2023-01-01 PROCEDURE — 71045 X-RAY EXAM CHEST 1 VIEW: CPT | Mod: 26

## 2023-01-01 PROCEDURE — 74177 CT ABD & PELVIS W/CONTRAST: CPT | Mod: 26,MA

## 2023-01-01 PROCEDURE — 99233 SBSQ HOSP IP/OBS HIGH 50: CPT

## 2023-01-01 PROCEDURE — 93010 ELECTROCARDIOGRAM REPORT: CPT

## 2023-01-01 PROCEDURE — 93990 DOPPLER FLOW TESTING: CPT | Mod: 26

## 2023-01-01 PROCEDURE — 99233 SBSQ HOSP IP/OBS HIGH 50: CPT | Mod: GC

## 2023-01-01 PROCEDURE — 37228: CPT | Mod: RT,79

## 2023-01-01 PROCEDURE — 36556 INSERT NON-TUNNEL CV CATH: CPT

## 2023-01-01 PROCEDURE — 99254 IP/OBS CNSLTJ NEW/EST MOD 60: CPT

## 2023-01-01 PROCEDURE — 99205 OFFICE O/P NEW HI 60 MIN: CPT

## 2023-01-01 PROCEDURE — 93971 EXTREMITY STUDY: CPT | Mod: 26,RT

## 2023-01-01 PROCEDURE — 90935 HEMODIALYSIS ONE EVALUATION: CPT | Mod: GC

## 2023-01-01 PROCEDURE — 99283 EMERGENCY DEPT VISIT LOW MDM: CPT | Mod: GC

## 2023-01-01 PROCEDURE — 76937 US GUIDE VASCULAR ACCESS: CPT | Mod: 26

## 2023-01-01 PROCEDURE — 70450 CT HEAD/BRAIN W/O DYE: CPT | Mod: 26

## 2023-01-01 PROCEDURE — 31500 INSERT EMERGENCY AIRWAY: CPT

## 2023-01-01 PROCEDURE — 90935 HEMODIALYSIS ONE EVALUATION: CPT

## 2023-01-01 PROCEDURE — 99215 OFFICE O/P EST HI 40 MIN: CPT

## 2023-01-01 PROCEDURE — 75710 ARTERY X-RAYS ARM/LEG: CPT | Mod: 26,59

## 2023-01-01 PROCEDURE — 93454 CORONARY ARTERY ANGIO S&I: CPT | Mod: 26

## 2023-01-01 PROCEDURE — 99214 OFFICE O/P EST MOD 30 MIN: CPT | Mod: 25

## 2023-01-01 PROCEDURE — 99239 HOSP IP/OBS DSCHRG MGMT >30: CPT

## 2023-01-01 PROCEDURE — 99214 OFFICE O/P EST MOD 30 MIN: CPT

## 2023-01-01 PROCEDURE — 71045 X-RAY EXAM CHEST 1 VIEW: CPT | Mod: 26,77

## 2023-01-01 PROCEDURE — 70498 CT ANGIOGRAPHY NECK: CPT | Mod: 26

## 2023-01-01 PROCEDURE — 72125 CT NECK SPINE W/O DYE: CPT | Mod: 26,MA

## 2023-01-01 PROCEDURE — 99285 EMERGENCY DEPT VISIT HI MDM: CPT

## 2023-01-01 PROCEDURE — 43235 EGD DIAGNOSTIC BRUSH WASH: CPT | Mod: GC,59

## 2023-01-01 PROCEDURE — 36832 AV FISTULA REVISION OPEN: CPT | Mod: LT,58

## 2023-01-01 PROCEDURE — 77001 FLUOROGUIDE FOR VEIN DEVICE: CPT | Mod: 26,GC

## 2023-01-01 PROCEDURE — 93000 ELECTROCARDIOGRAM COMPLETE: CPT

## 2023-01-01 PROCEDURE — 99223 1ST HOSP IP/OBS HIGH 75: CPT

## 2023-01-01 PROCEDURE — 99213 OFFICE O/P EST LOW 20 MIN: CPT

## 2023-01-01 PROCEDURE — 88305 TISSUE EXAM BY PATHOLOGIST: CPT | Mod: 26

## 2023-01-01 PROCEDURE — 70450 CT HEAD/BRAIN W/O DYE: CPT | Mod: 26,MA

## 2023-01-01 PROCEDURE — 99238 HOSP IP/OBS DSCHRG MGMT 30/<: CPT

## 2023-01-01 PROCEDURE — 70496 CT ANGIOGRAPHY HEAD: CPT | Mod: 26

## 2023-01-01 PROCEDURE — 99291 CRITICAL CARE FIRST HOUR: CPT | Mod: 25

## 2023-01-01 PROCEDURE — 93970 EXTREMITY STUDY: CPT | Mod: 26

## 2023-01-01 PROCEDURE — ZZZZZ: CPT

## 2023-01-01 PROCEDURE — 99291 CRITICAL CARE FIRST HOUR: CPT

## 2023-01-01 PROCEDURE — 73521 X-RAY EXAM HIPS BI 2 VIEWS: CPT | Mod: 26

## 2023-01-01 PROCEDURE — 45380 COLONOSCOPY AND BIOPSY: CPT | Mod: GC,53

## 2023-01-01 PROCEDURE — 36558 INSERT TUNNELED CV CATH: CPT

## 2023-01-01 PROCEDURE — 73610 X-RAY EXAM OF ANKLE: CPT | Mod: 26,50

## 2023-01-01 PROCEDURE — 74176 CT ABD & PELVIS W/O CONTRAST: CPT | Mod: 26,MA

## 2023-01-01 PROCEDURE — 75625 CONTRAST EXAM ABDOMINL AORTA: CPT | Mod: 26,59

## 2023-01-01 PROCEDURE — 71250 CT THORAX DX C-: CPT | Mod: 26,MA

## 2023-01-01 PROCEDURE — 73630 X-RAY EXAM OF FOOT: CPT | Mod: 26,50

## 2023-01-01 PROCEDURE — 74230 X-RAY XM SWLNG FUNCJ C+: CPT | Mod: 26

## 2023-01-01 PROCEDURE — 99152 MOD SED SAME PHYS/QHP 5/>YRS: CPT

## 2023-01-01 PROCEDURE — 93306 TTE W/DOPPLER COMPLETE: CPT | Mod: 26

## 2023-01-01 PROCEDURE — 99231 SBSQ HOSP IP/OBS SF/LOW 25: CPT

## 2023-01-01 PROCEDURE — 74176 CT ABD & PELVIS W/O CONTRAST: CPT | Mod: 26

## 2023-01-01 PROCEDURE — 90792 PSYCH DIAG EVAL W/MED SRVCS: CPT

## 2023-01-01 PROCEDURE — 93971 EXTREMITY STUDY: CPT | Mod: 26

## 2023-01-01 PROCEDURE — 99497 ADVNCD CARE PLAN 30 MIN: CPT | Mod: 25

## 2023-01-01 PROCEDURE — 93306 TTE W/DOPPLER COMPLETE: CPT

## 2023-01-01 PROCEDURE — 36821 AV FUSION DIRECT ANY SITE: CPT | Mod: LT

## 2023-01-01 PROCEDURE — 93975 VASCULAR STUDY: CPT | Mod: 26

## 2023-01-01 PROCEDURE — 93923 UPR/LXTR ART STDY 3+ LVLS: CPT | Mod: 26

## 2023-01-01 PROCEDURE — 74177 CT ABD & PELVIS W/CONTRAST: CPT | Mod: 26,MC

## 2023-01-01 DEVICE — SET ACCESS MICROPUNCTURE PEDAL 5F: Type: IMPLANTABLE DEVICE | Site: RIGHT | Status: FUNCTIONAL

## 2023-01-01 DEVICE — WIRE GUIDE COMMAND 300CM: Type: IMPLANTABLE DEVICE | Site: BILATERAL | Status: FUNCTIONAL

## 2023-01-01 DEVICE — CATH SUPPORT CXI 2.3X65CM ST TIP: Type: IMPLANTABLE DEVICE | Site: RIGHT | Status: FUNCTIONAL

## 2023-01-01 DEVICE — IMPLANTABLE DEVICE: Type: IMPLANTABLE DEVICE | Site: BILATERAL | Status: FUNCTIONAL

## 2023-01-01 DEVICE — LIGATING CLIPS WECK HORIZON SMALL-WIDE (RED) 24: Type: IMPLANTABLE DEVICE | Site: LEFT | Status: FUNCTIONAL

## 2023-01-01 DEVICE — IMPLANTABLE DEVICE: Type: IMPLANTABLE DEVICE | Site: RIGHT | Status: FUNCTIONAL

## 2023-01-01 DEVICE — CATH OMNI FLSH 0.035IN 5FRX65: Type: IMPLANTABLE DEVICE | Site: BILATERAL | Status: FUNCTIONAL

## 2023-01-01 DEVICE — CATH SUPPORT CXI 2.3X135CM ANG TIP: Type: IMPLANTABLE DEVICE | Site: BILATERAL | Status: FUNCTIONAL

## 2023-01-01 DEVICE — SHEATH GUIDING STR 5FRX55CM: Type: IMPLANTABLE DEVICE | Site: BILATERAL | Status: FUNCTIONAL

## 2023-01-01 DEVICE — GUIDEWIRE AMPLATZ SUPER-STIFF SHORT TAPER .035" X 260CM: Type: IMPLANTABLE DEVICE | Site: BILATERAL | Status: FUNCTIONAL

## 2023-01-01 DEVICE — GUIDEWIRE RADIFOCUS GLIDEWIRE STANDARD ANGLED TIP 0.035" X 260CM: Type: IMPLANTABLE DEVICE | Site: BILATERAL | Status: FUNCTIONAL

## 2023-01-01 DEVICE — SURGIFOAM PAD 8CM X 12.5CM X 2MM (100C): Type: IMPLANTABLE DEVICE | Site: LEFT | Status: FUNCTIONAL

## 2023-01-01 DEVICE — SURGICEL FIBRILLAR 2 X 4": Type: IMPLANTABLE DEVICE | Site: LEFT | Status: FUNCTIONAL

## 2023-01-01 DEVICE — LIGATING CLIPS WECK HORIZON MEDIUM (BLUE) 24: Type: IMPLANTABLE DEVICE | Site: LEFT | Status: FUNCTIONAL

## 2023-01-01 DEVICE — GWIRE VASC ENTRY MINISTICK MAX 4FRX10CM: Type: IMPLANTABLE DEVICE | Site: RIGHT | Status: FUNCTIONAL

## 2023-01-01 DEVICE — DEVICE CLOSURE 5F MYNX GRIP MUST ORDER MIN OF 10 EA: Type: IMPLANTABLE DEVICE | Site: BILATERAL | Status: FUNCTIONAL

## 2023-01-01 DEVICE — CATH QUICK CROSS .035X135CM: Type: IMPLANTABLE DEVICE | Site: BILATERAL | Status: FUNCTIONAL

## 2023-01-01 DEVICE — SHEATH INTRODUCER TERUMO PINNACLE PERIPHERAL 5FR X 10CM X 0.035" MINI WIRE: Type: IMPLANTABLE DEVICE | Site: BILATERAL | Status: FUNCTIONAL

## 2023-01-01 RX ORDER — HYDROMORPHONE HYDROCHLORIDE 2 MG/ML
0.5 INJECTION INTRAMUSCULAR; INTRAVENOUS; SUBCUTANEOUS
Refills: 0 | Status: DISCONTINUED | OUTPATIENT
Start: 2023-01-01 | End: 2023-01-01

## 2023-01-01 RX ORDER — LIDOCAINE 4 G/100G
1 CREAM TOPICAL
Qty: 0 | Refills: 0 | DISCHARGE
Start: 2023-01-01

## 2023-01-01 RX ORDER — THIAMINE MONONITRATE (VIT B1) 100 MG
500 TABLET ORAL THREE TIMES A DAY
Refills: 0 | Status: COMPLETED | OUTPATIENT
Start: 2023-01-01 | End: 2023-01-01

## 2023-01-01 RX ORDER — DEXTROSE 50 % IN WATER 50 %
12.5 SYRINGE (ML) INTRAVENOUS ONCE
Refills: 0 | Status: COMPLETED | OUTPATIENT
Start: 2023-01-01 | End: 2023-01-01

## 2023-01-01 RX ORDER — MAGNESIUM SULFATE 500 MG/ML
2 VIAL (ML) INJECTION ONCE
Refills: 0 | Status: COMPLETED | OUTPATIENT
Start: 2023-01-01 | End: 2023-01-01

## 2023-01-01 RX ORDER — OXYCODONE HYDROCHLORIDE 5 MG/1
1 TABLET ORAL
Qty: 0 | Refills: 0 | DISCHARGE
Start: 2023-01-01

## 2023-01-01 RX ORDER — MEROPENEM 1 G/30ML
500 INJECTION INTRAVENOUS EVERY 24 HOURS
Refills: 0 | Status: DISCONTINUED | OUTPATIENT
Start: 2023-01-01 | End: 2023-01-01

## 2023-01-01 RX ORDER — VITAMIN E 100 UNIT
1 CAPSULE ORAL
Qty: 30 | Refills: 0
Start: 2023-01-01 | End: 2023-01-01

## 2023-01-01 RX ORDER — OXYCODONE AND ACETAMINOPHEN 5; 325 MG/1; MG/1
2 TABLET ORAL
Refills: 0 | DISCHARGE

## 2023-01-01 RX ORDER — ERYTHROPOIETIN 10000 [IU]/ML
10000 INJECTION, SOLUTION INTRAVENOUS; SUBCUTANEOUS
Refills: 0 | Status: DISCONTINUED | OUTPATIENT
Start: 2023-01-01 | End: 2023-01-01

## 2023-01-01 RX ORDER — OXYCODONE HYDROCHLORIDE 5 MG/1
10 TABLET ORAL EVERY 4 HOURS
Refills: 0 | Status: DISCONTINUED | OUTPATIENT
Start: 2023-01-01 | End: 2023-01-01

## 2023-01-01 RX ORDER — POTASSIUM PHOSPHATE, MONOBASIC POTASSIUM PHOSPHATE, DIBASIC 236; 224 MG/ML; MG/ML
30 INJECTION, SOLUTION INTRAVENOUS ONCE
Refills: 0 | Status: DISCONTINUED | OUTPATIENT
Start: 2023-01-01 | End: 2023-01-01

## 2023-01-01 RX ORDER — POLYETHYLENE GLYCOL 3350 17 G/17G
17 POWDER, FOR SOLUTION ORAL DAILY
Refills: 0 | Status: DISCONTINUED | OUTPATIENT
Start: 2023-01-01 | End: 2023-01-01

## 2023-01-01 RX ORDER — HALOPERIDOL DECANOATE 100 MG/ML
1 INJECTION INTRAMUSCULAR
Qty: 21 | Refills: 0
Start: 2023-01-01 | End: 2023-01-01

## 2023-01-01 RX ORDER — POTASSIUM CHLORIDE 20 MEQ
40 PACKET (EA) ORAL ONCE
Refills: 0 | Status: COMPLETED | OUTPATIENT
Start: 2023-01-01 | End: 2023-01-01

## 2023-01-01 RX ORDER — POTASSIUM CHLORIDE 20 MEQ
20 PACKET (EA) ORAL ONCE
Refills: 0 | Status: COMPLETED | OUTPATIENT
Start: 2023-01-01 | End: 2023-01-01

## 2023-01-01 RX ORDER — ASPIRIN/CALCIUM CARB/MAGNESIUM 324 MG
81 TABLET ORAL DAILY
Refills: 0 | Status: DISCONTINUED | OUTPATIENT
Start: 2023-01-01 | End: 2023-01-01

## 2023-01-01 RX ORDER — LIPASE/PROTEASE/AMYLASE 16-48-48K
1 CAPSULE,DELAYED RELEASE (ENTERIC COATED) ORAL
Refills: 0 | Status: DISCONTINUED | OUTPATIENT
Start: 2023-01-01 | End: 2023-01-01

## 2023-01-01 RX ORDER — SODIUM BICARBONATE 1 MEQ/ML
650 SYRINGE (ML) INTRAVENOUS THREE TIMES A DAY
Refills: 0 | Status: DISCONTINUED | OUTPATIENT
Start: 2023-01-01 | End: 2023-01-01

## 2023-01-01 RX ORDER — LIPASE/PROTEASE/AMYLASE 16-48-48K
1 CAPSULE,DELAYED RELEASE (ENTERIC COATED) ORAL
Qty: 0 | Refills: 0 | DISCHARGE
Start: 2023-01-01

## 2023-01-01 RX ORDER — OXYCODONE HYDROCHLORIDE 5 MG/1
2.5 TABLET ORAL ONCE
Refills: 0 | Status: DISCONTINUED | OUTPATIENT
Start: 2023-01-01 | End: 2023-01-01

## 2023-01-01 RX ORDER — DEXTROSE 50 % IN WATER 50 %
25 SYRINGE (ML) INTRAVENOUS ONCE
Refills: 0 | Status: DISCONTINUED | OUTPATIENT
Start: 2023-01-01 | End: 2023-01-01

## 2023-01-01 RX ORDER — SENNA PLUS 8.6 MG/1
2 TABLET ORAL
Qty: 0 | Refills: 0 | DISCHARGE
Start: 2023-01-01

## 2023-01-01 RX ORDER — OXYCODONE 10 MG/1
10 TABLET ORAL EVERY 4 HOURS
Qty: 120 | Refills: 0 | Status: ACTIVE | COMMUNITY
Start: 2023-01-01 | End: 1900-01-01

## 2023-01-01 RX ORDER — SODIUM CHLORIDE 9 MG/ML
1000 INJECTION, SOLUTION INTRAVENOUS
Refills: 0 | Status: DISCONTINUED | OUTPATIENT
Start: 2023-01-01 | End: 2023-01-01

## 2023-01-01 RX ORDER — CITRIC ACID/SODIUM CITRATE 300-500 MG
15 SOLUTION, ORAL ORAL
Refills: 0 | Status: DISCONTINUED | OUTPATIENT
Start: 2023-01-01 | End: 2023-01-01

## 2023-01-01 RX ORDER — OXYCODONE HYDROCHLORIDE 5 MG/1
2.5 TABLET ORAL EVERY 6 HOURS
Refills: 0 | Status: DISCONTINUED | OUTPATIENT
Start: 2023-01-01 | End: 2023-01-01

## 2023-01-01 RX ORDER — FOLIC ACID 1 MG/1
1 TABLET ORAL DAILY
Refills: 0 | Status: ACTIVE | COMMUNITY
Start: 2023-01-01

## 2023-01-01 RX ORDER — CADEXOMER IODINE 0.9 %
1 PADS, MEDICATED (EA) TOPICAL DAILY
Refills: 0 | Status: DISCONTINUED | OUTPATIENT
Start: 2023-01-01 | End: 2023-01-01

## 2023-01-01 RX ORDER — OXYCODONE HYDROCHLORIDE 5 MG/1
10 TABLET ORAL ONCE
Refills: 0 | Status: DISCONTINUED | OUTPATIENT
Start: 2023-01-01 | End: 2023-01-01

## 2023-01-01 RX ORDER — METOPROLOL TARTRATE 50 MG
25 TABLET ORAL DAILY
Refills: 0 | Status: DISCONTINUED | OUTPATIENT
Start: 2023-01-01 | End: 2023-01-01

## 2023-01-01 RX ORDER — POTASSIUM CHLORIDE 20 MEQ
20 PACKET (EA) ORAL
Refills: 0 | Status: COMPLETED | OUTPATIENT
Start: 2023-01-01 | End: 2023-01-01

## 2023-01-01 RX ORDER — MAGNESIUM SULFATE 500 MG/ML
1 VIAL (ML) INJECTION ONCE
Refills: 0 | Status: COMPLETED | OUTPATIENT
Start: 2023-01-01 | End: 2023-01-01

## 2023-01-01 RX ORDER — ACETAMINOPHEN 500 MG
1000 TABLET ORAL ONCE
Refills: 0 | Status: COMPLETED | OUTPATIENT
Start: 2023-01-01 | End: 2023-01-01

## 2023-01-01 RX ORDER — PANTOPRAZOLE SODIUM 20 MG/1
1 TABLET, DELAYED RELEASE ORAL
Qty: 0 | Refills: 0 | DISCHARGE
Start: 2023-01-01

## 2023-01-01 RX ORDER — HEPARIN SODIUM 5000 [USP'U]/ML
4000 INJECTION INTRAVENOUS; SUBCUTANEOUS ONCE
Refills: 0 | Status: COMPLETED | OUTPATIENT
Start: 2023-01-01 | End: 2023-01-01

## 2023-01-01 RX ORDER — POLYETHYLENE GLYCOL 3350 17 G/17G
17 POWDER, FOR SOLUTION ORAL
Refills: 0 | Status: DISCONTINUED | OUTPATIENT
Start: 2023-01-01 | End: 2023-01-01

## 2023-01-01 RX ORDER — SENNA PLUS 8.6 MG/1
2 TABLET ORAL AT BEDTIME
Refills: 0 | Status: DISCONTINUED | OUTPATIENT
Start: 2023-01-01 | End: 2023-01-01

## 2023-01-01 RX ORDER — SUCRALFATE 1 G
1 TABLET ORAL EVERY 6 HOURS
Refills: 0 | Status: DISCONTINUED | OUTPATIENT
Start: 2023-01-01 | End: 2023-01-01

## 2023-01-01 RX ORDER — NALOXONE HYDROCHLORIDE 4 MG/.1ML
4 SPRAY NASAL
Refills: 0 | DISCHARGE

## 2023-01-01 RX ORDER — OLANZAPINE 15 MG/1
2.5 TABLET, FILM COATED ORAL EVERY 6 HOURS
Refills: 0 | Status: DISCONTINUED | OUTPATIENT
Start: 2023-01-01 | End: 2023-01-01

## 2023-01-01 RX ORDER — ALTEPLASE 100 MG
2 KIT INTRAVENOUS ONCE
Refills: 0 | Status: COMPLETED | OUTPATIENT
Start: 2023-01-01 | End: 2023-01-01

## 2023-01-01 RX ORDER — OMEPRAZOLE 20 MG/1
20 CAPSULE, DELAYED RELEASE ORAL TWICE DAILY
Qty: 60 | Refills: 0 | Status: ACTIVE | COMMUNITY
Start: 2022-08-10

## 2023-01-01 RX ORDER — THIAMINE MONONITRATE (VIT B1) 100 MG
1 TABLET ORAL
Qty: 0 | Refills: 0 | DISCHARGE
Start: 2023-01-01

## 2023-01-01 RX ORDER — CEFTRIAXONE 500 MG/1
2000 INJECTION, POWDER, FOR SOLUTION INTRAMUSCULAR; INTRAVENOUS ONCE
Refills: 0 | Status: COMPLETED | OUTPATIENT
Start: 2023-01-01 | End: 2023-01-01

## 2023-01-01 RX ORDER — DEXTROSE 50 % IN WATER 50 %
100 SYRINGE (ML) INTRAVENOUS ONCE
Refills: 0 | Status: COMPLETED | OUTPATIENT
Start: 2023-01-01 | End: 2023-01-01

## 2023-01-01 RX ORDER — THIAMINE MONONITRATE (VIT B1) 100 MG
100 TABLET ORAL ONCE
Refills: 0 | Status: COMPLETED | OUTPATIENT
Start: 2023-01-01 | End: 2023-01-01

## 2023-01-01 RX ORDER — CALCITRIOL 0.5 UG/1
0.5 CAPSULE, LIQUID FILLED ORAL
Refills: 0 | Status: ACTIVE | COMMUNITY
Start: 2023-01-01

## 2023-01-01 RX ORDER — FOAM BANDAGE 7" X 7"
BANDAGE TOPICAL
Qty: 30 | Refills: 2 | Status: ACTIVE | OUTPATIENT
Start: 2023-01-01

## 2023-01-01 RX ORDER — SODIUM CHLORIDE 9 MG/ML
1000 INJECTION INTRAMUSCULAR; INTRAVENOUS; SUBCUTANEOUS ONCE
Refills: 0 | Status: DISCONTINUED | OUTPATIENT
Start: 2023-01-01 | End: 2023-01-01

## 2023-01-01 RX ORDER — FENTANYL CITRATE 50 UG/ML
100 INJECTION INTRAVENOUS EVERY 4 HOURS
Refills: 0 | Status: DISCONTINUED | OUTPATIENT
Start: 2023-01-01 | End: 2023-01-01

## 2023-01-01 RX ORDER — LIDOCAINE 4 G/100G
1 CREAM TOPICAL ONCE
Refills: 0 | Status: COMPLETED | OUTPATIENT
Start: 2023-01-01 | End: 2023-01-01

## 2023-01-01 RX ORDER — CHLORHEXIDINE GLUCONATE 213 G/1000ML
1 SOLUTION TOPICAL
Refills: 0 | Status: DISCONTINUED | OUTPATIENT
Start: 2023-01-01 | End: 2023-01-01

## 2023-01-01 RX ORDER — CHLORHEXIDINE GLUCONATE 213 G/1000ML
1 SOLUTION TOPICAL DAILY
Refills: 0 | Status: DISCONTINUED | OUTPATIENT
Start: 2023-01-01 | End: 2023-01-01

## 2023-01-01 RX ORDER — POTASSIUM CHLORIDE 20 MEQ
10 PACKET (EA) ORAL ONCE
Refills: 0 | Status: COMPLETED | OUTPATIENT
Start: 2023-01-01 | End: 2023-01-01

## 2023-01-01 RX ORDER — OXYCODONE HYDROCHLORIDE 5 MG/1
7.5 TABLET ORAL ONCE
Refills: 0 | Status: DISCONTINUED | OUTPATIENT
Start: 2023-01-01 | End: 2023-01-01

## 2023-01-01 RX ORDER — OXYCODONE AND ACETAMINOPHEN 5; 325 MG/1; MG/1
2 TABLET ORAL
Qty: 84 | Refills: 0
Start: 2023-01-01 | End: 2023-01-01

## 2023-01-01 RX ORDER — GABAPENTIN 400 MG/1
100 CAPSULE ORAL AT BEDTIME
Refills: 0 | Status: DISCONTINUED | OUTPATIENT
Start: 2023-01-01 | End: 2023-01-01

## 2023-01-01 RX ORDER — OXYCODONE HYDROCHLORIDE 5 MG/1
5 TABLET ORAL EVERY 6 HOURS
Refills: 0 | Status: DISCONTINUED | OUTPATIENT
Start: 2023-01-01 | End: 2023-01-01

## 2023-01-01 RX ORDER — HYDROMORPHONE HYDROCHLORIDE 2 MG/ML
1 INJECTION INTRAMUSCULAR; INTRAVENOUS; SUBCUTANEOUS ONCE
Refills: 0 | Status: DISCONTINUED | OUTPATIENT
Start: 2023-01-01 | End: 2023-01-01

## 2023-01-01 RX ORDER — OXYCODONE HYDROCHLORIDE 5 MG/1
7.5 TABLET ORAL EVERY 4 HOURS
Refills: 0 | Status: DISCONTINUED | OUTPATIENT
Start: 2023-01-01 | End: 2023-01-01

## 2023-01-01 RX ORDER — HEPARIN SODIUM 5000 [USP'U]/ML
2000 INJECTION INTRAVENOUS; SUBCUTANEOUS EVERY 6 HOURS
Refills: 0 | Status: DISCONTINUED | OUTPATIENT
Start: 2023-01-01 | End: 2023-01-01

## 2023-01-01 RX ORDER — SODIUM BICARBONATE 1 MEQ/ML
1300 SYRINGE (ML) INTRAVENOUS
Refills: 0 | Status: DISCONTINUED | OUTPATIENT
Start: 2023-01-01 | End: 2023-01-01

## 2023-01-01 RX ORDER — POTASSIUM CHLORIDE 20 MEQ
40 PACKET (EA) ORAL EVERY 4 HOURS
Refills: 0 | Status: COMPLETED | OUTPATIENT
Start: 2023-01-01 | End: 2023-01-01

## 2023-01-01 RX ORDER — OXYCODONE HYDROCHLORIDE 5 MG/1
3 TABLET ORAL
Qty: 0 | Refills: 0 | DISCHARGE

## 2023-01-01 RX ORDER — APIXABAN 2.5 MG/1
2.5 TABLET, FILM COATED ORAL
Refills: 0 | Status: DISCONTINUED | OUTPATIENT
Start: 2023-01-01 | End: 2023-01-01

## 2023-01-01 RX ORDER — MORPHINE SULFATE 50 MG/1
4 CAPSULE, EXTENDED RELEASE ORAL ONCE
Refills: 0 | Status: DISCONTINUED | OUTPATIENT
Start: 2023-01-01 | End: 2023-01-01

## 2023-01-01 RX ORDER — ERYTHROPOIETIN 10000 [IU]/ML
20000 INJECTION, SOLUTION INTRAVENOUS; SUBCUTANEOUS
Refills: 0 | Status: DISCONTINUED | OUTPATIENT
Start: 2023-01-01 | End: 2023-01-01

## 2023-01-01 RX ORDER — HALOPERIDOL DECANOATE 100 MG/ML
1 INJECTION INTRAMUSCULAR AT BEDTIME
Refills: 0 | Status: DISCONTINUED | OUTPATIENT
Start: 2023-01-01 | End: 2023-01-01

## 2023-01-01 RX ORDER — SODIUM BICARBONATE 1 MEQ/ML
1300 SYRINGE (ML) INTRAVENOUS THREE TIMES A DAY
Refills: 0 | Status: DISCONTINUED | OUTPATIENT
Start: 2023-01-01 | End: 2023-01-01

## 2023-01-01 RX ORDER — HEPARIN SODIUM 5000 [USP'U]/ML
1600 INJECTION INTRAVENOUS; SUBCUTANEOUS
Qty: 25000 | Refills: 0 | Status: DISCONTINUED | OUTPATIENT
Start: 2023-01-01 | End: 2023-01-01

## 2023-01-01 RX ORDER — MAGNESIUM SULFATE 500 MG/ML
2 VIAL (ML) INJECTION ONCE
Refills: 0 | Status: DISCONTINUED | OUTPATIENT
Start: 2023-01-01 | End: 2023-01-01

## 2023-01-01 RX ORDER — TIZANIDINE 4 MG/1
2 TABLET ORAL EVERY 8 HOURS
Refills: 0 | Status: DISCONTINUED | OUTPATIENT
Start: 2023-01-01 | End: 2023-01-01

## 2023-01-01 RX ORDER — HEPARIN SODIUM 5000 [USP'U]/ML
1200 INJECTION INTRAVENOUS; SUBCUTANEOUS
Qty: 25000 | Refills: 0 | Status: DISCONTINUED | OUTPATIENT
Start: 2023-01-01 | End: 2023-01-01

## 2023-01-01 RX ORDER — POTASSIUM CHLORIDE 20 MEQ
10 PACKET (EA) ORAL
Refills: 0 | Status: COMPLETED | OUTPATIENT
Start: 2023-01-01 | End: 2023-01-01

## 2023-01-01 RX ORDER — DEXTROSE 50 % IN WATER 50 %
12.5 SYRINGE (ML) INTRAVENOUS ONCE
Refills: 0 | Status: DISCONTINUED | OUTPATIENT
Start: 2023-01-01 | End: 2023-01-01

## 2023-01-01 RX ORDER — OXYCODONE HYDROCHLORIDE 5 MG/1
5 TABLET ORAL ONCE
Refills: 0 | Status: DISCONTINUED | OUTPATIENT
Start: 2023-01-01 | End: 2023-01-01

## 2023-01-01 RX ORDER — MAGNESIUM OXIDE 400 MG ORAL TABLET 241.3 MG
400 TABLET ORAL
Refills: 0 | Status: COMPLETED | OUTPATIENT
Start: 2023-01-01 | End: 2023-01-01

## 2023-01-01 RX ORDER — LANOLIN ALCOHOL/MO/W.PET/CERES
3 CREAM (GRAM) TOPICAL AT BEDTIME
Refills: 0 | Status: DISCONTINUED | OUTPATIENT
Start: 2023-01-01 | End: 2023-01-01

## 2023-01-01 RX ORDER — NIFEDIPINE 30 MG
1 TABLET, EXTENDED RELEASE 24 HR ORAL
Qty: 0 | Refills: 0 | DISCHARGE
Start: 2023-01-01

## 2023-01-01 RX ORDER — NALOXONE HYDROCHLORIDE 4 MG/.1ML
4 SPRAY NASAL
Qty: 1 | Refills: 0
Start: 2023-01-01 | End: 2023-01-01

## 2023-01-01 RX ORDER — IPRATROPIUM/ALBUTEROL SULFATE 18-103MCG
3 AEROSOL WITH ADAPTER (GRAM) INHALATION ONCE
Refills: 0 | Status: COMPLETED | OUTPATIENT
Start: 2023-01-01 | End: 2023-01-01

## 2023-01-01 RX ORDER — HEPARIN SODIUM 5000 [USP'U]/ML
4500 INJECTION INTRAVENOUS; SUBCUTANEOUS EVERY 6 HOURS
Refills: 0 | Status: DISCONTINUED | OUTPATIENT
Start: 2023-01-01 | End: 2023-01-01

## 2023-01-01 RX ORDER — ARIPIPRAZOLE 15 MG/1
1 TABLET ORAL
Qty: 30 | Refills: 0
Start: 2023-01-01 | End: 2023-01-01

## 2023-01-01 RX ORDER — OXYCODONE AND ACETAMINOPHEN 5; 325 MG/1; MG/1
5-325 TABLET ORAL
Qty: 240 | Refills: 0 | Status: DISCONTINUED | COMMUNITY
Start: 2023-01-01 | End: 2023-01-01

## 2023-01-01 RX ORDER — PANTOPRAZOLE SODIUM 20 MG/1
40 TABLET, DELAYED RELEASE ORAL
Refills: 0 | Status: COMPLETED | OUTPATIENT
Start: 2023-01-01 | End: 2023-01-01

## 2023-01-01 RX ORDER — CHLORHEXIDINE GLUCONATE 213 G/1000ML
15 SOLUTION TOPICAL EVERY 12 HOURS
Refills: 0 | Status: DISCONTINUED | OUTPATIENT
Start: 2023-01-01 | End: 2023-01-01

## 2023-01-01 RX ORDER — HYDROMORPHONE HYDROCHLORIDE 2 MG/ML
0.3 INJECTION INTRAMUSCULAR; INTRAVENOUS; SUBCUTANEOUS EVERY 6 HOURS
Refills: 0 | Status: DISCONTINUED | OUTPATIENT
Start: 2023-01-01 | End: 2023-01-01

## 2023-01-01 RX ORDER — VITAMIN E 100 UNIT
200 CAPSULE ORAL DAILY
Refills: 0 | Status: DISCONTINUED | OUTPATIENT
Start: 2023-01-01 | End: 2023-01-01

## 2023-01-01 RX ORDER — LIDOCAINE 4 G/100G
1 CREAM TOPICAL EVERY 24 HOURS
Refills: 0 | Status: COMPLETED | OUTPATIENT
Start: 2023-01-01 | End: 2023-01-01

## 2023-01-01 RX ORDER — LOPERAMIDE HCL 2 MG
2 TABLET ORAL THREE TIMES A DAY
Refills: 0 | Status: DISCONTINUED | OUTPATIENT
Start: 2023-01-01 | End: 2023-01-01

## 2023-01-01 RX ORDER — SUCRALFATE 1 G
1 TABLET ORAL
Qty: 0 | Refills: 0 | DISCHARGE
Start: 2023-01-01

## 2023-01-01 RX ORDER — FERROUS SULFATE 325(65) MG
1 TABLET ORAL
Qty: 30 | Refills: 0
Start: 2023-01-01 | End: 2023-01-01

## 2023-01-01 RX ORDER — HYDROMORPHONE HYDROCHLORIDE 2 MG/ML
1 INJECTION INTRAMUSCULAR; INTRAVENOUS; SUBCUTANEOUS
Refills: 0 | Status: DISCONTINUED | OUTPATIENT
Start: 2023-01-01 | End: 2023-01-01

## 2023-01-01 RX ORDER — PANTOPRAZOLE SODIUM 20 MG/1
40 TABLET, DELAYED RELEASE ORAL
Refills: 0 | Status: DISCONTINUED | OUTPATIENT
Start: 2023-01-01 | End: 2023-01-01

## 2023-01-01 RX ORDER — VITAMIN E 100 UNIT
1 CAPSULE ORAL
Qty: 21 | Refills: 0
Start: 2023-01-01 | End: 2023-01-01

## 2023-01-01 RX ORDER — CALCITRIOL 0.5 UG/1
0.5 CAPSULE ORAL DAILY
Refills: 0 | Status: DISCONTINUED | OUTPATIENT
Start: 2023-01-01 | End: 2023-01-01

## 2023-01-01 RX ORDER — ONDANSETRON 8 MG/1
4 TABLET, FILM COATED ORAL ONCE
Refills: 0 | Status: DISCONTINUED | OUTPATIENT
Start: 2023-01-01 | End: 2023-01-01

## 2023-01-01 RX ORDER — HYDROMORPHONE HYDROCHLORIDE 2 MG/ML
0.2 INJECTION INTRAMUSCULAR; INTRAVENOUS; SUBCUTANEOUS ONCE
Refills: 0 | Status: DISCONTINUED | OUTPATIENT
Start: 2023-01-01 | End: 2023-01-01

## 2023-01-01 RX ORDER — HEPARIN SODIUM 5000 [USP'U]/ML
1900 INJECTION INTRAVENOUS; SUBCUTANEOUS
Qty: 25000 | Refills: 0 | Status: DISCONTINUED | OUTPATIENT
Start: 2023-01-01 | End: 2023-01-01

## 2023-01-01 RX ORDER — ATORVASTATIN CALCIUM 80 MG/1
1 TABLET, FILM COATED ORAL
Qty: 30 | Refills: 0
Start: 2023-01-01 | End: 2023-01-01

## 2023-01-01 RX ORDER — NIFEDIPINE 30 MG/1
30 TABLET, EXTENDED RELEASE ORAL DAILY
Refills: 0 | Status: ACTIVE | COMMUNITY
Start: 2023-01-01

## 2023-01-01 RX ORDER — POTASSIUM CHLORIDE 20 MEQ
40 PACKET (EA) ORAL ONCE
Refills: 0 | Status: DISCONTINUED | OUTPATIENT
Start: 2023-01-01 | End: 2023-01-01

## 2023-01-01 RX ORDER — APIXABAN 2.5 MG/1
5 TABLET, FILM COATED ORAL
Refills: 0 | Status: DISCONTINUED | OUTPATIENT
Start: 2023-01-01 | End: 2023-01-01

## 2023-01-01 RX ORDER — SUCRALFATE 1 G/1
1 TABLET ORAL 4 TIMES DAILY
Refills: 0 | Status: ACTIVE | COMMUNITY
Start: 2023-01-01

## 2023-01-01 RX ORDER — MIDAZOLAM HYDROCHLORIDE 1 MG/ML
0.02 INJECTION, SOLUTION INTRAMUSCULAR; INTRAVENOUS
Qty: 100 | Refills: 0 | Status: DISCONTINUED | OUTPATIENT
Start: 2023-01-01 | End: 2023-01-01

## 2023-01-01 RX ORDER — OXYCODONE HYDROCHLORIDE 5 MG/1
1 TABLET ORAL
Qty: 12 | Refills: 0
Start: 2023-01-01 | End: 2023-01-01

## 2023-01-01 RX ORDER — LIDOCAINE 4 G/100G
1 CREAM TOPICAL DAILY
Refills: 0 | Status: COMPLETED | OUTPATIENT
Start: 2023-01-01 | End: 2023-01-01

## 2023-01-01 RX ORDER — DEXTROSE 50 % IN WATER 50 %
50 SYRINGE (ML) INTRAVENOUS ONCE
Refills: 0 | Status: COMPLETED | OUTPATIENT
Start: 2023-01-01 | End: 2023-01-01

## 2023-01-01 RX ORDER — DEXTROSE 10 % IN WATER 10 %
1000 INTRAVENOUS SOLUTION INTRAVENOUS
Refills: 0 | Status: DISCONTINUED | OUTPATIENT
Start: 2023-01-01 | End: 2023-01-01

## 2023-01-01 RX ORDER — NUTRITIONAL SUPPLEMENT 0.06 G-1.5
LIQUID (ML) ORAL
Qty: 90 | Refills: 1 | Status: ACTIVE | COMMUNITY
Start: 2023-01-01 | End: 1900-01-01

## 2023-01-01 RX ORDER — HEPARIN SODIUM 5000 [USP'U]/ML
1700 INJECTION INTRAVENOUS; SUBCUTANEOUS
Qty: 25000 | Refills: 0 | Status: DISCONTINUED | OUTPATIENT
Start: 2023-01-01 | End: 2023-01-01

## 2023-01-01 RX ORDER — POTASSIUM CHLORIDE 20 MEQ
10 PACKET (EA) ORAL
Refills: 0 | Status: DISCONTINUED | OUTPATIENT
Start: 2023-01-01 | End: 2023-01-01

## 2023-01-01 RX ORDER — APIXABAN 2.5 MG/1
1 TABLET, FILM COATED ORAL
Qty: 0 | Refills: 0 | DISCHARGE
Start: 2023-01-01

## 2023-01-01 RX ORDER — SOD SULF/SODIUM/NAHCO3/KCL/PEG
4000 SOLUTION, RECONSTITUTED, ORAL ORAL ONCE
Refills: 0 | Status: COMPLETED | OUTPATIENT
Start: 2023-01-01 | End: 2023-01-01

## 2023-01-01 RX ORDER — ENOXAPARIN SODIUM 100 MG/ML
40 INJECTION SUBCUTANEOUS EVERY 24 HOURS
Refills: 0 | Status: DISCONTINUED | OUTPATIENT
Start: 2023-01-01 | End: 2023-01-01

## 2023-01-01 RX ORDER — HEPARIN SODIUM 5000 [USP'U]/ML
1100 INJECTION INTRAVENOUS; SUBCUTANEOUS
Qty: 25000 | Refills: 0 | Status: DISCONTINUED | OUTPATIENT
Start: 2023-01-01 | End: 2023-01-01

## 2023-01-01 RX ORDER — FOLIC ACID 0.8 MG
1 TABLET ORAL
Qty: 0 | Refills: 0 | DISCHARGE
Start: 2023-01-01

## 2023-01-01 RX ORDER — FENTANYL CITRATE 50 UG/ML
50 INJECTION INTRAVENOUS
Refills: 0 | Status: DISCONTINUED | OUTPATIENT
Start: 2023-01-01 | End: 2023-01-01

## 2023-01-01 RX ORDER — MAGNESIUM OXIDE 400 MG ORAL TABLET 241.3 MG
400 TABLET ORAL
Refills: 0 | Status: DISCONTINUED | OUTPATIENT
Start: 2023-01-01 | End: 2023-01-01

## 2023-01-01 RX ORDER — POLYETHYLENE GLYCOL 3350 AND ELECTROLYTES WITH LEMON FLAVOR 236; 22.74; 6.74; 5.86; 2.97 G/4L; G/4L; G/4L; G/4L; G/4L
236 POWDER, FOR SOLUTION ORAL
Qty: 1 | Refills: 0 | Status: DISCONTINUED | COMMUNITY
Start: 2022-05-19 | End: 2023-01-01

## 2023-01-01 RX ORDER — MUPIROCIN 20 MG/G
1 OINTMENT TOPICAL
Refills: 0 | Status: COMPLETED | OUTPATIENT
Start: 2023-01-01 | End: 2023-01-01

## 2023-01-01 RX ORDER — IBUPROFEN 200 MG
400 TABLET ORAL ONCE
Refills: 0 | Status: DISCONTINUED | OUTPATIENT
Start: 2023-01-01 | End: 2023-01-01

## 2023-01-01 RX ORDER — METOCLOPRAMIDE HCL 10 MG
5 TABLET ORAL THREE TIMES A DAY
Refills: 0 | Status: DISCONTINUED | OUTPATIENT
Start: 2023-01-01 | End: 2023-01-01

## 2023-01-01 RX ORDER — PANTOPRAZOLE SODIUM 20 MG/1
40 TABLET, DELAYED RELEASE ORAL DAILY
Refills: 0 | Status: DISCONTINUED | OUTPATIENT
Start: 2023-01-01 | End: 2023-01-01

## 2023-01-01 RX ORDER — GLUCAGON INJECTION, SOLUTION 0.5 MG/.1ML
1 INJECTION, SOLUTION SUBCUTANEOUS ONCE
Refills: 0 | Status: DISCONTINUED | OUTPATIENT
Start: 2023-01-01 | End: 2023-01-01

## 2023-01-01 RX ORDER — ASPIRIN/CALCIUM CARB/MAGNESIUM 324 MG
1 TABLET ORAL
Qty: 30 | Refills: 0
Start: 2023-01-01 | End: 2023-01-01

## 2023-01-01 RX ORDER — HEPARIN SODIUM 5000 [USP'U]/ML
5000 INJECTION INTRAVENOUS; SUBCUTANEOUS EVERY 12 HOURS
Refills: 0 | Status: DISCONTINUED | OUTPATIENT
Start: 2023-01-01 | End: 2023-01-01

## 2023-01-01 RX ORDER — POLYETHYLENE GLYCOL 3350 17 G/17G
17 POWDER, FOR SOLUTION ORAL
Qty: 0 | Refills: 0 | DISCHARGE

## 2023-01-01 RX ORDER — METOPROLOL TARTRATE 50 MG
1 TABLET ORAL
Qty: 21 | Refills: 0
Start: 2023-01-01 | End: 2023-01-01

## 2023-01-01 RX ORDER — DEXTROSE 50 % IN WATER 50 %
15 SYRINGE (ML) INTRAVENOUS ONCE
Refills: 0 | Status: DISCONTINUED | OUTPATIENT
Start: 2023-01-01 | End: 2023-01-01

## 2023-01-01 RX ORDER — CADEXOMER IODINE 0.9 %
1 PADS, MEDICATED (EA) TOPICAL
Qty: 0 | Refills: 0 | DISCHARGE
Start: 2023-01-01

## 2023-01-01 RX ORDER — OLANZAPINE 15 MG/1
2.5 TABLET, FILM COATED ORAL AT BEDTIME
Refills: 0 | Status: DISCONTINUED | OUTPATIENT
Start: 2023-01-01 | End: 2023-01-01

## 2023-01-01 RX ORDER — APIXABAN 5 MG/1
5 TABLET, FILM COATED ORAL
Qty: 60 | Refills: 3 | Status: ACTIVE | COMMUNITY
Start: 2023-01-01

## 2023-01-01 RX ORDER — IRON SUCROSE 20 MG/ML
100 INJECTION, SOLUTION INTRAVENOUS ONCE
Refills: 0 | Status: COMPLETED | OUTPATIENT
Start: 2023-01-01 | End: 2023-01-01

## 2023-01-01 RX ORDER — GABAPENTIN 400 MG/1
1 CAPSULE ORAL
Qty: 14 | Refills: 0
Start: 2023-01-01 | End: 2023-01-01

## 2023-01-01 RX ORDER — LOSARTAN POTASSIUM 100 MG/1
1 TABLET, FILM COATED ORAL
Qty: 21 | Refills: 0
Start: 2023-01-01 | End: 2023-01-01

## 2023-01-01 RX ORDER — HEPARIN SODIUM 5000 [USP'U]/ML
1500 INJECTION INTRAVENOUS; SUBCUTANEOUS
Qty: 25000 | Refills: 0 | Status: DISCONTINUED | OUTPATIENT
Start: 2023-01-01 | End: 2023-01-01

## 2023-01-01 RX ORDER — FENTANYL CITRATE 50 UG/ML
0.5 INJECTION INTRAVENOUS
Qty: 2500 | Refills: 0 | Status: DISCONTINUED | OUTPATIENT
Start: 2023-01-01 | End: 2023-01-01

## 2023-01-01 RX ORDER — HYDROPHILIC CREAM
PASTE (GRAM) TOPICAL
Qty: 1 | Refills: 3 | Status: ACTIVE | OUTPATIENT
Start: 2023-01-01

## 2023-01-01 RX ORDER — POTASSIUM PHOSPHATE, MONOBASIC POTASSIUM PHOSPHATE, DIBASIC 236; 224 MG/ML; MG/ML
15 INJECTION, SOLUTION INTRAVENOUS ONCE
Refills: 0 | Status: COMPLETED | OUTPATIENT
Start: 2023-01-01 | End: 2023-01-01

## 2023-01-01 RX ORDER — METOCLOPRAMIDE HCL 10 MG
1 TABLET ORAL
Qty: 0 | Refills: 0 | DISCHARGE
Start: 2023-01-01

## 2023-01-01 RX ORDER — PANTOPRAZOLE SODIUM 20 MG/1
40 TABLET, DELAYED RELEASE ORAL EVERY 12 HOURS
Refills: 0 | Status: DISCONTINUED | OUTPATIENT
Start: 2023-01-01 | End: 2023-01-01

## 2023-01-01 RX ORDER — IRON SUCROSE 20 MG/ML
200 INJECTION, SOLUTION INTRAVENOUS
Refills: 0 | Status: COMPLETED | OUTPATIENT
Start: 2023-01-01 | End: 2023-01-01

## 2023-01-01 RX ORDER — ROCURONIUM BROMIDE 10 MG/ML
70 VIAL (ML) INTRAVENOUS ONCE
Refills: 0 | Status: COMPLETED | OUTPATIENT
Start: 2023-01-01 | End: 2023-01-01

## 2023-01-01 RX ORDER — TRAMADOL HYDROCHLORIDE 50 MG/1
25 TABLET ORAL ONCE
Refills: 0 | Status: DISCONTINUED | OUTPATIENT
Start: 2023-01-01 | End: 2023-01-01

## 2023-01-01 RX ORDER — APIXABAN 2.5 MG/1
5 TABLET, FILM COATED ORAL EVERY 12 HOURS
Refills: 0 | Status: DISCONTINUED | OUTPATIENT
Start: 2023-01-01 | End: 2023-01-01

## 2023-01-01 RX ORDER — METOPROLOL TARTRATE 50 MG
1 TABLET ORAL
Qty: 30 | Refills: 0
Start: 2023-01-01 | End: 2023-01-01

## 2023-01-01 RX ORDER — SODIUM CHLORIDE 9 MG/ML
1000 INJECTION INTRAMUSCULAR; INTRAVENOUS; SUBCUTANEOUS ONCE
Refills: 0 | Status: COMPLETED | OUTPATIENT
Start: 2023-01-01 | End: 2023-01-01

## 2023-01-01 RX ORDER — ONDANSETRON 8 MG/1
4 TABLET, FILM COATED ORAL EVERY 6 HOURS
Refills: 0 | Status: DISCONTINUED | OUTPATIENT
Start: 2023-01-01 | End: 2023-01-01

## 2023-01-01 RX ORDER — LOSARTAN POTASSIUM 100 MG/1
25 TABLET, FILM COATED ORAL DAILY
Refills: 0 | Status: DISCONTINUED | OUTPATIENT
Start: 2023-01-01 | End: 2023-01-01

## 2023-01-01 RX ORDER — SODIUM BICARBONATE 1 MEQ/ML
1625 SYRINGE (ML) INTRAVENOUS THREE TIMES A DAY
Refills: 0 | Status: DISCONTINUED | OUTPATIENT
Start: 2023-01-01 | End: 2023-01-01

## 2023-01-01 RX ORDER — MORPHINE SULFATE 50 MG/1
2 CAPSULE, EXTENDED RELEASE ORAL EVERY 4 HOURS
Refills: 0 | Status: DISCONTINUED | OUTPATIENT
Start: 2023-01-01 | End: 2023-01-01

## 2023-01-01 RX ORDER — SODIUM CHLORIDE 9 MG/ML
1000 INJECTION INTRAMUSCULAR; INTRAVENOUS; SUBCUTANEOUS
Refills: 0 | Status: DISCONTINUED | OUTPATIENT
Start: 2023-01-01 | End: 2023-01-01

## 2023-01-01 RX ORDER — LOSARTAN POTASSIUM 100 MG/1
12.5 TABLET, FILM COATED ORAL DAILY
Refills: 0 | Status: DISCONTINUED | OUTPATIENT
Start: 2023-01-01 | End: 2023-01-01

## 2023-01-01 RX ORDER — ACETAMINOPHEN 500 MG
650 TABLET ORAL EVERY 6 HOURS
Refills: 0 | Status: DISCONTINUED | OUTPATIENT
Start: 2023-01-01 | End: 2023-01-01

## 2023-01-01 RX ORDER — SODIUM BICARBONATE 1 MEQ/ML
50 SYRINGE (ML) INTRAVENOUS ONCE
Refills: 0 | Status: COMPLETED | OUTPATIENT
Start: 2023-01-01 | End: 2023-01-01

## 2023-01-01 RX ORDER — POTASSIUM CHLORIDE 20 MEQ
40 PACKET (EA) ORAL
Refills: 0 | Status: COMPLETED | OUTPATIENT
Start: 2023-01-01 | End: 2023-01-01

## 2023-01-01 RX ORDER — OXYCODONE HYDROCHLORIDE 5 MG/1
10 TABLET ORAL EVERY 6 HOURS
Refills: 0 | Status: DISCONTINUED | OUTPATIENT
Start: 2023-01-01 | End: 2023-01-01

## 2023-01-01 RX ORDER — VANCOMYCIN HCL 1 G
1000 VIAL (EA) INTRAVENOUS ONCE
Refills: 0 | Status: COMPLETED | OUTPATIENT
Start: 2023-01-01 | End: 2023-01-01

## 2023-01-01 RX ORDER — PANCRELIPASE 30000; 6000; 19000 [USP'U]/1; [USP'U]/1; [USP'U]/1
6000-19000 CAPSULE, DELAYED RELEASE PELLETS ORAL
Qty: 180 | Refills: 5 | Status: ACTIVE | COMMUNITY
Start: 2022-03-05 | End: 1900-01-01

## 2023-01-01 RX ORDER — METOCLOPRAMIDE HCL 10 MG
5 TABLET ORAL EVERY 8 HOURS
Refills: 0 | Status: DISCONTINUED | OUTPATIENT
Start: 2023-01-01 | End: 2023-01-01

## 2023-01-01 RX ORDER — ATORVASTATIN CALCIUM 80 MG/1
1 TABLET, FILM COATED ORAL
Qty: 21 | Refills: 0
Start: 2023-01-01 | End: 2023-01-01

## 2023-01-01 RX ORDER — LOSARTAN POTASSIUM 100 MG/1
1 TABLET, FILM COATED ORAL
Qty: 30 | Refills: 0
Start: 2023-01-01 | End: 2023-01-01

## 2023-01-01 RX ORDER — FENTANYL CITRATE 50 UG/ML
25 INJECTION INTRAVENOUS
Refills: 0 | Status: DISCONTINUED | OUTPATIENT
Start: 2023-01-01 | End: 2023-01-01

## 2023-01-01 RX ORDER — TIZANIDINE 2 MG/1
2 TABLET ORAL
Refills: 0 | Status: ACTIVE | COMMUNITY
Start: 2023-01-01

## 2023-01-01 RX ORDER — BENZOCAINE AND MENTHOL 5; 1 G/100ML; G/100ML
1 LIQUID ORAL
Refills: 0 | Status: DISCONTINUED | OUTPATIENT
Start: 2023-01-01 | End: 2023-01-01

## 2023-01-01 RX ORDER — SODIUM,POTASSIUM PHOSPHATES 278-250MG
1 POWDER IN PACKET (EA) ORAL ONCE
Refills: 0 | Status: COMPLETED | OUTPATIENT
Start: 2023-01-01 | End: 2023-01-01

## 2023-01-01 RX ORDER — MUPIROCIN 20 MG/G
1 OINTMENT TOPICAL
Refills: 0 | Status: DISCONTINUED | OUTPATIENT
Start: 2023-01-01 | End: 2023-01-01

## 2023-01-01 RX ORDER — CALCITRIOL 0.5 UG/1
1 CAPSULE ORAL
Qty: 0 | Refills: 0 | DISCHARGE
Start: 2023-01-01

## 2023-01-01 RX ORDER — POLYETHYLENE GLYCOL 3350 17 G/17G
17 POWDER, FOR SOLUTION ORAL
Qty: 0 | Refills: 0 | DISCHARGE
Start: 2023-01-01

## 2023-01-01 RX ORDER — HEPARIN SODIUM 5000 [USP'U]/ML
INJECTION INTRAVENOUS; SUBCUTANEOUS
Qty: 25000 | Refills: 0 | Status: DISCONTINUED | OUTPATIENT
Start: 2023-01-01 | End: 2023-01-01

## 2023-01-01 RX ORDER — ATORVASTATIN CALCIUM 80 MG/1
40 TABLET, FILM COATED ORAL AT BEDTIME
Refills: 0 | Status: DISCONTINUED | OUTPATIENT
Start: 2023-01-01 | End: 2023-01-01

## 2023-01-01 RX ORDER — HEPARIN SODIUM 5000 [USP'U]/ML
1400 INJECTION INTRAVENOUS; SUBCUTANEOUS
Qty: 25000 | Refills: 0 | Status: DISCONTINUED | OUTPATIENT
Start: 2023-01-01 | End: 2023-01-01

## 2023-01-01 RX ORDER — LIDOCAINE 4 G/100G
1 CREAM TOPICAL EVERY 24 HOURS
Refills: 0 | Status: DISCONTINUED | OUTPATIENT
Start: 2023-01-01 | End: 2023-01-01

## 2023-01-01 RX ORDER — PIPERACILLIN AND TAZOBACTAM 4; .5 G/20ML; G/20ML
3.38 INJECTION, POWDER, LYOPHILIZED, FOR SOLUTION INTRAVENOUS ONCE
Refills: 0 | Status: COMPLETED | OUTPATIENT
Start: 2023-01-01 | End: 2023-01-01

## 2023-01-01 RX ORDER — DEXMEDETOMIDINE HYDROCHLORIDE IN 0.9% SODIUM CHLORIDE 4 UG/ML
0.5 INJECTION INTRAVENOUS
Qty: 200 | Refills: 0 | Status: DISCONTINUED | OUTPATIENT
Start: 2023-01-01 | End: 2023-01-01

## 2023-01-01 RX ORDER — OXYCODONE AND ACETAMINOPHEN 5; 325 MG/1; MG/1
1 TABLET ORAL EVERY 6 HOURS
Refills: 0 | Status: DISCONTINUED | OUTPATIENT
Start: 2023-01-01 | End: 2023-01-01

## 2023-01-01 RX ORDER — THIAMINE MONONITRATE (VIT B1) 100 MG
1 TABLET ORAL
Qty: 0 | Refills: 0 | DISCHARGE

## 2023-01-01 RX ORDER — CEFTRIAXONE 500 MG/1
2000 INJECTION, POWDER, FOR SOLUTION INTRAMUSCULAR; INTRAVENOUS EVERY 24 HOURS
Refills: 0 | Status: COMPLETED | OUTPATIENT
Start: 2023-01-01 | End: 2023-01-01

## 2023-01-01 RX ORDER — ONDANSETRON 8 MG/1
4 TABLET, FILM COATED ORAL ONCE
Refills: 0 | Status: COMPLETED | OUTPATIENT
Start: 2023-01-01 | End: 2023-01-01

## 2023-01-01 RX ORDER — SUCRALFATE 1 G
1 TABLET ORAL ONCE
Refills: 0 | Status: DISCONTINUED | OUTPATIENT
Start: 2023-01-01 | End: 2023-01-01

## 2023-01-01 RX ORDER — FAMOTIDINE 20 MG/1
20 TABLET, FILM COATED ORAL DAILY
Qty: 60 | Refills: 0 | Status: DISCONTINUED | COMMUNITY
Start: 2022-09-20 | End: 2023-01-01

## 2023-01-01 RX ORDER — BENZOCAINE AND MENTHOL 5; 1 G/100ML; G/100ML
1 LIQUID ORAL EVERY 6 HOURS
Refills: 0 | Status: DISCONTINUED | OUTPATIENT
Start: 2023-01-01 | End: 2023-01-01

## 2023-01-01 RX ORDER — FAMOTIDINE 10 MG/ML
20 INJECTION INTRAVENOUS ONCE
Refills: 0 | Status: COMPLETED | OUTPATIENT
Start: 2023-01-01 | End: 2023-01-01

## 2023-01-01 RX ORDER — SODIUM CHLORIDE 9 MG/ML
10 INJECTION INTRAMUSCULAR; INTRAVENOUS; SUBCUTANEOUS
Refills: 0 | Status: DISCONTINUED | OUTPATIENT
Start: 2023-01-01 | End: 2023-01-01

## 2023-01-01 RX ORDER — ASPIRIN/CALCIUM CARB/MAGNESIUM 324 MG
1 TABLET ORAL
Qty: 21 | Refills: 0
Start: 2023-01-01 | End: 2023-01-01

## 2023-01-01 RX ORDER — HEPARIN SODIUM 5000 [USP'U]/ML
1000 INJECTION INTRAVENOUS; SUBCUTANEOUS
Qty: 25000 | Refills: 0 | Status: DISCONTINUED | OUTPATIENT
Start: 2023-01-01 | End: 2023-01-01

## 2023-01-01 RX ORDER — MAGNESIUM SULFATE 500 MG/ML
1 VIAL (ML) INJECTION ONCE
Refills: 0 | Status: DISCONTINUED | OUTPATIENT
Start: 2023-01-01 | End: 2023-01-01

## 2023-01-01 RX ORDER — ONDANSETRON 8 MG/1
4 TABLET, FILM COATED ORAL EVERY 8 HOURS
Refills: 0 | Status: DISCONTINUED | OUTPATIENT
Start: 2023-01-01 | End: 2023-01-01

## 2023-01-01 RX ORDER — SODIUM BICARBONATE 1 MEQ/ML
1300 SYRINGE (ML) INTRAVENOUS ONCE
Refills: 0 | Status: COMPLETED | OUTPATIENT
Start: 2023-01-01 | End: 2023-01-01

## 2023-01-01 RX ORDER — SODIUM,POTASSIUM PHOSPHATES 278-250MG
1 POWDER IN PACKET (EA) ORAL ONCE
Refills: 0 | Status: DISCONTINUED | OUTPATIENT
Start: 2023-01-01 | End: 2023-01-01

## 2023-01-01 RX ORDER — OXYCODONE 5 MG/1
5 TABLET ORAL EVERY 4 HOURS
Qty: 336 | Refills: 0 | Status: DISCONTINUED | COMMUNITY
Start: 2022-08-10 | End: 2023-01-01

## 2023-01-01 RX ORDER — ETOMIDATE 2 MG/ML
20 INJECTION INTRAVENOUS ONCE
Refills: 0 | Status: COMPLETED | OUTPATIENT
Start: 2023-01-01 | End: 2023-01-01

## 2023-01-01 RX ORDER — TIZANIDINE 4 MG/1
1 TABLET ORAL
Qty: 0 | Refills: 0 | DISCHARGE
Start: 2023-01-01

## 2023-01-01 RX ORDER — PIPERACILLIN AND TAZOBACTAM 4; .5 G/20ML; G/20ML
3.38 INJECTION, POWDER, LYOPHILIZED, FOR SOLUTION INTRAVENOUS EVERY 12 HOURS
Refills: 0 | Status: DISCONTINUED | OUTPATIENT
Start: 2023-01-01 | End: 2023-01-01

## 2023-01-01 RX ORDER — FERROUS SULFATE 325(65) MG
325 TABLET ORAL DAILY
Refills: 0 | Status: DISCONTINUED | OUTPATIENT
Start: 2023-01-01 | End: 2023-01-01

## 2023-01-01 RX ORDER — HEPARIN SODIUM 5000 [USP'U]/ML
1300 INJECTION INTRAVENOUS; SUBCUTANEOUS
Qty: 25000 | Refills: 0 | Status: DISCONTINUED | OUTPATIENT
Start: 2023-01-01 | End: 2023-01-01

## 2023-01-01 RX ORDER — HALOPERIDOL DECANOATE 100 MG/ML
1 INJECTION INTRAMUSCULAR
Qty: 30 | Refills: 0
Start: 2023-01-01 | End: 2023-01-01

## 2023-01-01 RX ORDER — ARIPIPRAZOLE 15 MG/1
2 TABLET ORAL DAILY
Refills: 0 | Status: DISCONTINUED | OUTPATIENT
Start: 2023-01-01 | End: 2023-01-01

## 2023-01-01 RX ORDER — IPRATROPIUM/ALBUTEROL SULFATE 18-103MCG
3 AEROSOL WITH ADAPTER (GRAM) INHALATION EVERY 6 HOURS
Refills: 0 | Status: DISCONTINUED | OUTPATIENT
Start: 2023-01-01 | End: 2023-01-01

## 2023-01-01 RX ORDER — SODIUM CHLORIDE 9 MG/ML
500 INJECTION INTRAMUSCULAR; INTRAVENOUS; SUBCUTANEOUS ONCE
Refills: 0 | Status: COMPLETED | OUTPATIENT
Start: 2023-01-01 | End: 2023-01-01

## 2023-01-01 RX ORDER — METOCLOPRAMIDE 10 MG/1
10 TABLET ORAL 3 TIMES DAILY
Qty: 90 | Refills: 1 | Status: DISCONTINUED | COMMUNITY
Start: 2022-10-18 | End: 2023-01-01

## 2023-01-01 RX ADMIN — Medication 1 CAPSULE(S): at 08:09

## 2023-01-01 RX ADMIN — OXYCODONE HYDROCHLORIDE 10 MILLIGRAM(S): 5 TABLET ORAL at 17:13

## 2023-01-01 RX ADMIN — Medication 100 MILLIGRAM(S): at 11:57

## 2023-01-01 RX ADMIN — OXYCODONE HYDROCHLORIDE 5 MILLIGRAM(S): 5 TABLET ORAL at 20:15

## 2023-01-01 RX ADMIN — OXYCODONE HYDROCHLORIDE 10 MILLIGRAM(S): 5 TABLET ORAL at 11:35

## 2023-01-01 RX ADMIN — Medication 1 CAPSULE(S): at 12:43

## 2023-01-01 RX ADMIN — OXYCODONE HYDROCHLORIDE 7.5 MILLIGRAM(S): 5 TABLET ORAL at 05:32

## 2023-01-01 RX ADMIN — OXYCODONE HYDROCHLORIDE 10 MILLIGRAM(S): 5 TABLET ORAL at 00:43

## 2023-01-01 RX ADMIN — OXYCODONE HYDROCHLORIDE 10 MILLIGRAM(S): 5 TABLET ORAL at 01:12

## 2023-01-01 RX ADMIN — OXYCODONE HYDROCHLORIDE 7.5 MILLIGRAM(S): 5 TABLET ORAL at 14:08

## 2023-01-01 RX ADMIN — OXYCODONE HYDROCHLORIDE 10 MILLIGRAM(S): 5 TABLET ORAL at 15:18

## 2023-01-01 RX ADMIN — Medication 50 MILLILITER(S): at 19:55

## 2023-01-01 RX ADMIN — OXYCODONE HYDROCHLORIDE 10 MILLIGRAM(S): 5 TABLET ORAL at 20:55

## 2023-01-01 RX ADMIN — OXYCODONE HYDROCHLORIDE 10 MILLIGRAM(S): 5 TABLET ORAL at 00:58

## 2023-01-01 RX ADMIN — Medication 325 MILLIGRAM(S): at 11:35

## 2023-01-01 RX ADMIN — OXYCODONE HYDROCHLORIDE 10 MILLIGRAM(S): 5 TABLET ORAL at 22:06

## 2023-01-01 RX ADMIN — OXYCODONE HYDROCHLORIDE 10 MILLIGRAM(S): 5 TABLET ORAL at 18:43

## 2023-01-01 RX ADMIN — OXYCODONE HYDROCHLORIDE 5 MILLIGRAM(S): 5 TABLET ORAL at 12:53

## 2023-01-01 RX ADMIN — Medication 1 TABLET(S): at 12:29

## 2023-01-01 RX ADMIN — Medication 1 APPLICATION(S): at 13:50

## 2023-01-01 RX ADMIN — CHLORHEXIDINE GLUCONATE 1 APPLICATION(S): 213 SOLUTION TOPICAL at 05:12

## 2023-01-01 RX ADMIN — HEPARIN SODIUM 13 UNIT(S)/HR: 5000 INJECTION INTRAVENOUS; SUBCUTANEOUS at 13:58

## 2023-01-01 RX ADMIN — OXYCODONE HYDROCHLORIDE 10 MILLIGRAM(S): 5 TABLET ORAL at 01:09

## 2023-01-01 RX ADMIN — PIPERACILLIN AND TAZOBACTAM 25 GRAM(S): 4; .5 INJECTION, POWDER, LYOPHILIZED, FOR SOLUTION INTRAVENOUS at 00:45

## 2023-01-01 RX ADMIN — PANTOPRAZOLE SODIUM 40 MILLIGRAM(S): 20 TABLET, DELAYED RELEASE ORAL at 18:55

## 2023-01-01 RX ADMIN — LIDOCAINE 1 PATCH: 4 CREAM TOPICAL at 16:23

## 2023-01-01 RX ADMIN — Medication 30 MILLIGRAM(S): at 06:47

## 2023-01-01 RX ADMIN — Medication 100 MILLIGRAM(S): at 12:53

## 2023-01-01 RX ADMIN — Medication 1 TABLET(S): at 12:43

## 2023-01-01 RX ADMIN — OXYCODONE HYDROCHLORIDE 10 MILLIGRAM(S): 5 TABLET ORAL at 10:18

## 2023-01-01 RX ADMIN — Medication 1 CAPSULE(S): at 07:49

## 2023-01-01 RX ADMIN — OXYCODONE HYDROCHLORIDE 10 MILLIGRAM(S): 5 TABLET ORAL at 07:23

## 2023-01-01 RX ADMIN — Medication 20 MILLIEQUIVALENT(S): at 14:57

## 2023-01-01 RX ADMIN — Medication 1 APPLICATION(S): at 15:37

## 2023-01-01 RX ADMIN — Medication 30 MILLIGRAM(S): at 05:42

## 2023-01-01 RX ADMIN — OXYCODONE HYDROCHLORIDE 10 MILLIGRAM(S): 5 TABLET ORAL at 15:09

## 2023-01-01 RX ADMIN — Medication 1 GRAM(S): at 01:48

## 2023-01-01 RX ADMIN — CHLORHEXIDINE GLUCONATE 15 MILLILITER(S): 213 SOLUTION TOPICAL at 06:05

## 2023-01-01 RX ADMIN — OXYCODONE HYDROCHLORIDE 10 MILLIGRAM(S): 5 TABLET ORAL at 00:01

## 2023-01-01 RX ADMIN — OXYCODONE HYDROCHLORIDE 10 MILLIGRAM(S): 5 TABLET ORAL at 03:24

## 2023-01-01 RX ADMIN — OXYCODONE HYDROCHLORIDE 10 MILLIGRAM(S): 5 TABLET ORAL at 03:06

## 2023-01-01 RX ADMIN — HEPARIN SODIUM 5000 UNIT(S): 5000 INJECTION INTRAVENOUS; SUBCUTANEOUS at 05:18

## 2023-01-01 RX ADMIN — HYDROMORPHONE HYDROCHLORIDE 0.5 MILLIGRAM(S): 2 INJECTION INTRAMUSCULAR; INTRAVENOUS; SUBCUTANEOUS at 15:31

## 2023-01-01 RX ADMIN — Medication 1 CAPSULE(S): at 09:26

## 2023-01-01 RX ADMIN — Medication 1 GRAM(S): at 12:47

## 2023-01-01 RX ADMIN — OXYCODONE HYDROCHLORIDE 10 MILLIGRAM(S): 5 TABLET ORAL at 23:03

## 2023-01-01 RX ADMIN — HEPARIN SODIUM 5000 UNIT(S): 5000 INJECTION INTRAVENOUS; SUBCUTANEOUS at 07:36

## 2023-01-01 RX ADMIN — OXYCODONE HYDROCHLORIDE 7.5 MILLIGRAM(S): 5 TABLET ORAL at 06:07

## 2023-01-01 RX ADMIN — OXYCODONE HYDROCHLORIDE 10 MILLIGRAM(S): 5 TABLET ORAL at 16:43

## 2023-01-01 RX ADMIN — OXYCODONE HYDROCHLORIDE 10 MILLIGRAM(S): 5 TABLET ORAL at 09:51

## 2023-01-01 RX ADMIN — CHLORHEXIDINE GLUCONATE 1 APPLICATION(S): 213 SOLUTION TOPICAL at 06:02

## 2023-01-01 RX ADMIN — OXYCODONE HYDROCHLORIDE 10 MILLIGRAM(S): 5 TABLET ORAL at 17:06

## 2023-01-01 RX ADMIN — OXYCODONE HYDROCHLORIDE 10 MILLIGRAM(S): 5 TABLET ORAL at 09:25

## 2023-01-01 RX ADMIN — Medication 1 CAPSULE(S): at 08:20

## 2023-01-01 RX ADMIN — Medication 30 MILLIGRAM(S): at 06:38

## 2023-01-01 RX ADMIN — Medication 1 APPLICATION(S): at 15:30

## 2023-01-01 RX ADMIN — HEPARIN SODIUM 5000 UNIT(S): 5000 INJECTION INTRAVENOUS; SUBCUTANEOUS at 22:40

## 2023-01-01 RX ADMIN — Medication 1 CAPSULE(S): at 12:17

## 2023-01-01 RX ADMIN — OXYCODONE HYDROCHLORIDE 10 MILLIGRAM(S): 5 TABLET ORAL at 21:39

## 2023-01-01 RX ADMIN — HEPARIN SODIUM 2400 UNIT(S)/HR: 5000 INJECTION INTRAVENOUS; SUBCUTANEOUS at 04:48

## 2023-01-01 RX ADMIN — APIXABAN 5 MILLIGRAM(S): 2.5 TABLET, FILM COATED ORAL at 21:12

## 2023-01-01 RX ADMIN — OXYCODONE HYDROCHLORIDE 10 MILLIGRAM(S): 5 TABLET ORAL at 23:16

## 2023-01-01 RX ADMIN — LIDOCAINE 1 PATCH: 4 CREAM TOPICAL at 19:30

## 2023-01-01 RX ADMIN — Medication 2 MILLIGRAM(S): at 13:42

## 2023-01-01 RX ADMIN — Medication 100 MILLIEQUIVALENT(S): at 11:33

## 2023-01-01 RX ADMIN — Medication 100 MILLIGRAM(S): at 12:44

## 2023-01-01 RX ADMIN — OXYCODONE HYDROCHLORIDE 5 MILLIGRAM(S): 5 TABLET ORAL at 08:29

## 2023-01-01 RX ADMIN — Medication 30 MILLIGRAM(S): at 05:34

## 2023-01-01 RX ADMIN — HEPARIN SODIUM 5000 UNIT(S): 5000 INJECTION INTRAVENOUS; SUBCUTANEOUS at 13:01

## 2023-01-01 RX ADMIN — POLYETHYLENE GLYCOL 3350 17 GRAM(S): 17 POWDER, FOR SOLUTION ORAL at 20:35

## 2023-01-01 RX ADMIN — OXYCODONE HYDROCHLORIDE 10 MILLIGRAM(S): 5 TABLET ORAL at 21:12

## 2023-01-01 RX ADMIN — OXYCODONE HYDROCHLORIDE 10 MILLIGRAM(S): 5 TABLET ORAL at 09:06

## 2023-01-01 RX ADMIN — OXYCODONE HYDROCHLORIDE 10 MILLIGRAM(S): 5 TABLET ORAL at 03:46

## 2023-01-01 RX ADMIN — OXYCODONE HYDROCHLORIDE 10 MILLIGRAM(S): 5 TABLET ORAL at 22:00

## 2023-01-01 RX ADMIN — Medication 1 CAPSULE(S): at 18:48

## 2023-01-01 RX ADMIN — Medication 1 CAPSULE(S): at 16:39

## 2023-01-01 RX ADMIN — MUPIROCIN 1 APPLICATION(S): 20 OINTMENT TOPICAL at 07:27

## 2023-01-01 RX ADMIN — Medication 25 GRAM(S): at 12:52

## 2023-01-01 RX ADMIN — Medication 81 MILLIGRAM(S): at 15:01

## 2023-01-01 RX ADMIN — ERYTHROPOIETIN 10000 UNIT(S): 10000 INJECTION, SOLUTION INTRAVENOUS; SUBCUTANEOUS at 15:42

## 2023-01-01 RX ADMIN — Medication 5 MILLIGRAM(S): at 06:38

## 2023-01-01 RX ADMIN — OXYCODONE HYDROCHLORIDE 10 MILLIGRAM(S): 5 TABLET ORAL at 10:34

## 2023-01-01 RX ADMIN — Medication 1 TABLET(S): at 14:58

## 2023-01-01 RX ADMIN — OXYCODONE HYDROCHLORIDE 10 MILLIGRAM(S): 5 TABLET ORAL at 22:43

## 2023-01-01 RX ADMIN — PANTOPRAZOLE SODIUM 40 MILLIGRAM(S): 20 TABLET, DELAYED RELEASE ORAL at 06:56

## 2023-01-01 RX ADMIN — Medication 2 MILLIGRAM(S): at 03:56

## 2023-01-01 RX ADMIN — OXYCODONE HYDROCHLORIDE 10 MILLIGRAM(S): 5 TABLET ORAL at 02:02

## 2023-01-01 RX ADMIN — OXYCODONE HYDROCHLORIDE 10 MILLIGRAM(S): 5 TABLET ORAL at 16:32

## 2023-01-01 RX ADMIN — LIDOCAINE 1 PATCH: 4 CREAM TOPICAL at 00:11

## 2023-01-01 RX ADMIN — Medication 105 MILLIGRAM(S): at 06:55

## 2023-01-01 RX ADMIN — CHLORHEXIDINE GLUCONATE 1 APPLICATION(S): 213 SOLUTION TOPICAL at 18:23

## 2023-01-01 RX ADMIN — OXYCODONE HYDROCHLORIDE 10 MILLIGRAM(S): 5 TABLET ORAL at 21:31

## 2023-01-01 RX ADMIN — Medication 1 GRAM(S): at 17:23

## 2023-01-01 RX ADMIN — Medication 1 CAPSULE(S): at 11:56

## 2023-01-01 RX ADMIN — Medication 5 MILLIGRAM(S): at 07:41

## 2023-01-01 RX ADMIN — OXYCODONE HYDROCHLORIDE 7.5 MILLIGRAM(S): 5 TABLET ORAL at 03:59

## 2023-01-01 RX ADMIN — Medication 25 GRAM(S): at 12:57

## 2023-01-01 RX ADMIN — OXYCODONE HYDROCHLORIDE 10 MILLIGRAM(S): 5 TABLET ORAL at 13:51

## 2023-01-01 RX ADMIN — Medication 1 MILLIGRAM(S): at 13:50

## 2023-01-01 RX ADMIN — Medication 5 MILLIGRAM(S): at 12:43

## 2023-01-01 RX ADMIN — OXYCODONE HYDROCHLORIDE 10 MILLIGRAM(S): 5 TABLET ORAL at 10:57

## 2023-01-01 RX ADMIN — CHLORHEXIDINE GLUCONATE 1 APPLICATION(S): 213 SOLUTION TOPICAL at 11:35

## 2023-01-01 RX ADMIN — OXYCODONE HYDROCHLORIDE 10 MILLIGRAM(S): 5 TABLET ORAL at 23:43

## 2023-01-01 RX ADMIN — OXYCODONE HYDROCHLORIDE 10 MILLIGRAM(S): 5 TABLET ORAL at 15:01

## 2023-01-01 RX ADMIN — OXYCODONE HYDROCHLORIDE 7.5 MILLIGRAM(S): 5 TABLET ORAL at 18:14

## 2023-01-01 RX ADMIN — CALCITRIOL 0.5 MICROGRAM(S): 0.5 CAPSULE ORAL at 13:03

## 2023-01-01 RX ADMIN — SODIUM CHLORIDE 60 MILLILITER(S): 9 INJECTION INTRAMUSCULAR; INTRAVENOUS; SUBCUTANEOUS at 21:08

## 2023-01-01 RX ADMIN — MEROPENEM 100 MILLIGRAM(S): 1 INJECTION INTRAVENOUS at 13:47

## 2023-01-01 RX ADMIN — Medication 1 CAPSULE(S): at 13:03

## 2023-01-01 RX ADMIN — OXYCODONE HYDROCHLORIDE 5 MILLIGRAM(S): 5 TABLET ORAL at 10:05

## 2023-01-01 RX ADMIN — ONDANSETRON 4 MILLIGRAM(S): 8 TABLET, FILM COATED ORAL at 13:47

## 2023-01-01 RX ADMIN — OXYCODONE HYDROCHLORIDE 7.5 MILLIGRAM(S): 5 TABLET ORAL at 01:40

## 2023-01-01 RX ADMIN — Medication 5 MILLIGRAM(S): at 05:51

## 2023-01-01 RX ADMIN — Medication 600 MILLIGRAM(S): at 22:01

## 2023-01-01 RX ADMIN — OXYCODONE HYDROCHLORIDE 10 MILLIGRAM(S): 5 TABLET ORAL at 23:45

## 2023-01-01 RX ADMIN — IRON SUCROSE 110 MILLIGRAM(S): 20 INJECTION, SOLUTION INTRAVENOUS at 09:26

## 2023-01-01 RX ADMIN — OXYCODONE HYDROCHLORIDE 10 MILLIGRAM(S): 5 TABLET ORAL at 17:28

## 2023-01-01 RX ADMIN — Medication 5 MILLIGRAM(S): at 15:46

## 2023-01-01 RX ADMIN — OXYCODONE HYDROCHLORIDE 7.5 MILLIGRAM(S): 5 TABLET ORAL at 00:49

## 2023-01-01 RX ADMIN — FENTANYL CITRATE 3.29 MICROGRAM(S)/KG/HR: 50 INJECTION INTRAVENOUS at 01:05

## 2023-01-01 RX ADMIN — OXYCODONE HYDROCHLORIDE 10 MILLIGRAM(S): 5 TABLET ORAL at 20:58

## 2023-01-01 RX ADMIN — OXYCODONE HYDROCHLORIDE 10 MILLIGRAM(S): 5 TABLET ORAL at 14:45

## 2023-01-01 RX ADMIN — HEPARIN SODIUM 10 UNIT(S)/HR: 5000 INJECTION INTRAVENOUS; SUBCUTANEOUS at 05:02

## 2023-01-01 RX ADMIN — OXYCODONE HYDROCHLORIDE 10 MILLIGRAM(S): 5 TABLET ORAL at 19:59

## 2023-01-01 RX ADMIN — OXYCODONE HYDROCHLORIDE 7.5 MILLIGRAM(S): 5 TABLET ORAL at 06:58

## 2023-01-01 RX ADMIN — Medication 1 APPLICATION(S): at 13:06

## 2023-01-01 RX ADMIN — OXYCODONE HYDROCHLORIDE 5 MILLIGRAM(S): 5 TABLET ORAL at 06:53

## 2023-01-01 RX ADMIN — OXYCODONE HYDROCHLORIDE 10 MILLIGRAM(S): 5 TABLET ORAL at 13:31

## 2023-01-01 RX ADMIN — PANTOPRAZOLE SODIUM 40 MILLIGRAM(S): 20 TABLET, DELAYED RELEASE ORAL at 17:41

## 2023-01-01 RX ADMIN — Medication 1 CAPSULE(S): at 19:48

## 2023-01-01 RX ADMIN — HEPARIN SODIUM 5000 UNIT(S): 5000 INJECTION INTRAVENOUS; SUBCUTANEOUS at 22:11

## 2023-01-01 RX ADMIN — PANTOPRAZOLE SODIUM 40 MILLIGRAM(S): 20 TABLET, DELAYED RELEASE ORAL at 18:22

## 2023-01-01 RX ADMIN — OXYCODONE HYDROCHLORIDE 10 MILLIGRAM(S): 5 TABLET ORAL at 00:46

## 2023-01-01 RX ADMIN — CHLORHEXIDINE GLUCONATE 1 APPLICATION(S): 213 SOLUTION TOPICAL at 18:01

## 2023-01-01 RX ADMIN — OXYCODONE HYDROCHLORIDE 10 MILLIGRAM(S): 5 TABLET ORAL at 17:48

## 2023-01-01 RX ADMIN — Medication 105 MILLIGRAM(S): at 23:11

## 2023-01-01 RX ADMIN — Medication 5 MILLIGRAM(S): at 21:34

## 2023-01-01 RX ADMIN — OXYCODONE HYDROCHLORIDE 7.5 MILLIGRAM(S): 5 TABLET ORAL at 06:22

## 2023-01-01 RX ADMIN — Medication 25 GRAM(S): at 11:55

## 2023-01-01 RX ADMIN — MEROPENEM 100 MILLIGRAM(S): 1 INJECTION INTRAVENOUS at 12:12

## 2023-01-01 RX ADMIN — APIXABAN 5 MILLIGRAM(S): 2.5 TABLET, FILM COATED ORAL at 10:17

## 2023-01-01 RX ADMIN — FENTANYL CITRATE 3.29 MICROGRAM(S)/KG/HR: 50 INJECTION INTRAVENOUS at 07:50

## 2023-01-01 RX ADMIN — OXYCODONE HYDROCHLORIDE 10 MILLIGRAM(S): 5 TABLET ORAL at 05:20

## 2023-01-01 RX ADMIN — Medication 1 CAPSULE(S): at 17:54

## 2023-01-01 RX ADMIN — CEFTRIAXONE 100 MILLIGRAM(S): 500 INJECTION, POWDER, FOR SOLUTION INTRAMUSCULAR; INTRAVENOUS at 08:02

## 2023-01-01 RX ADMIN — Medication 1 MILLIGRAM(S): at 13:02

## 2023-01-01 RX ADMIN — OXYCODONE HYDROCHLORIDE 10 MILLIGRAM(S): 5 TABLET ORAL at 17:49

## 2023-01-01 RX ADMIN — MORPHINE SULFATE 2 MILLIGRAM(S): 50 CAPSULE, EXTENDED RELEASE ORAL at 17:45

## 2023-01-01 RX ADMIN — Medication 5 MILLIGRAM(S): at 22:45

## 2023-01-01 RX ADMIN — Medication 1 CAPSULE(S): at 09:12

## 2023-01-01 RX ADMIN — CALCITRIOL 0.25 MICROGRAM(S): 0.5 CAPSULE ORAL at 11:37

## 2023-01-01 RX ADMIN — OXYCODONE HYDROCHLORIDE 10 MILLIGRAM(S): 5 TABLET ORAL at 12:29

## 2023-01-01 RX ADMIN — Medication 1300 MILLIGRAM(S): at 15:04

## 2023-01-01 RX ADMIN — CHLORHEXIDINE GLUCONATE 1 APPLICATION(S): 213 SOLUTION TOPICAL at 12:00

## 2023-01-01 RX ADMIN — HEPARIN SODIUM 5000 UNIT(S): 5000 INJECTION INTRAVENOUS; SUBCUTANEOUS at 17:54

## 2023-01-01 RX ADMIN — IRON SUCROSE 110 MILLIGRAM(S): 20 INJECTION, SOLUTION INTRAVENOUS at 12:32

## 2023-01-01 RX ADMIN — HEPARIN SODIUM 17 UNIT(S)/HR: 5000 INJECTION INTRAVENOUS; SUBCUTANEOUS at 08:21

## 2023-01-01 RX ADMIN — Medication 1 CAPSULE(S): at 13:22

## 2023-01-01 RX ADMIN — Medication 20 MILLIEQUIVALENT(S): at 13:48

## 2023-01-01 RX ADMIN — OXYCODONE HYDROCHLORIDE 10 MILLIGRAM(S): 5 TABLET ORAL at 00:45

## 2023-01-01 RX ADMIN — Medication 1 TABLET(S): at 11:58

## 2023-01-01 RX ADMIN — OXYCODONE HYDROCHLORIDE 7.5 MILLIGRAM(S): 5 TABLET ORAL at 19:02

## 2023-01-01 RX ADMIN — OXYCODONE HYDROCHLORIDE 10 MILLIGRAM(S): 5 TABLET ORAL at 15:48

## 2023-01-01 RX ADMIN — OXYCODONE HYDROCHLORIDE 10 MILLIGRAM(S): 5 TABLET ORAL at 21:08

## 2023-01-01 RX ADMIN — PANTOPRAZOLE SODIUM 40 MILLIGRAM(S): 20 TABLET, DELAYED RELEASE ORAL at 05:56

## 2023-01-01 RX ADMIN — OXYCODONE HYDROCHLORIDE 7.5 MILLIGRAM(S): 5 TABLET ORAL at 10:23

## 2023-01-01 RX ADMIN — ERYTHROPOIETIN 10000 UNIT(S): 10000 INJECTION, SOLUTION INTRAVENOUS; SUBCUTANEOUS at 20:11

## 2023-01-01 RX ADMIN — Medication 100 MILLIGRAM(S): at 12:29

## 2023-01-01 RX ADMIN — PANTOPRAZOLE SODIUM 40 MILLIGRAM(S): 20 TABLET, DELAYED RELEASE ORAL at 18:25

## 2023-01-01 RX ADMIN — OXYCODONE HYDROCHLORIDE 7.5 MILLIGRAM(S): 5 TABLET ORAL at 13:04

## 2023-01-01 RX ADMIN — Medication 1 MILLIGRAM(S): at 15:35

## 2023-01-01 RX ADMIN — Medication 1 MILLIGRAM(S): at 13:03

## 2023-01-01 RX ADMIN — Medication 1 APPLICATION(S): at 12:02

## 2023-01-01 RX ADMIN — CALCITRIOL 0.5 MICROGRAM(S): 0.5 CAPSULE ORAL at 11:59

## 2023-01-01 RX ADMIN — OXYCODONE HYDROCHLORIDE 10 MILLIGRAM(S): 5 TABLET ORAL at 22:15

## 2023-01-01 RX ADMIN — Medication 1 CAPSULE(S): at 16:57

## 2023-01-01 RX ADMIN — OXYCODONE HYDROCHLORIDE 10 MILLIGRAM(S): 5 TABLET ORAL at 14:10

## 2023-01-01 RX ADMIN — Medication 1 GRAM(S): at 05:57

## 2023-01-01 RX ADMIN — OXYCODONE HYDROCHLORIDE 10 MILLIGRAM(S): 5 TABLET ORAL at 13:18

## 2023-01-01 RX ADMIN — OXYCODONE HYDROCHLORIDE 10 MILLIGRAM(S): 5 TABLET ORAL at 10:22

## 2023-01-01 RX ADMIN — PIPERACILLIN AND TAZOBACTAM 200 GRAM(S): 4; .5 INJECTION, POWDER, LYOPHILIZED, FOR SOLUTION INTRAVENOUS at 22:20

## 2023-01-01 RX ADMIN — Medication 50 MILLILITER(S): at 06:47

## 2023-01-01 RX ADMIN — OXYCODONE HYDROCHLORIDE 7.5 MILLIGRAM(S): 5 TABLET ORAL at 17:21

## 2023-01-01 RX ADMIN — OXYCODONE HYDROCHLORIDE 10 MILLIGRAM(S): 5 TABLET ORAL at 23:00

## 2023-01-01 RX ADMIN — CHLORHEXIDINE GLUCONATE 1 APPLICATION(S): 213 SOLUTION TOPICAL at 06:56

## 2023-01-01 RX ADMIN — OXYCODONE HYDROCHLORIDE 10 MILLIGRAM(S): 5 TABLET ORAL at 17:09

## 2023-01-01 RX ADMIN — Medication 30 MILLIGRAM(S): at 06:49

## 2023-01-01 RX ADMIN — OXYCODONE HYDROCHLORIDE 10 MILLIGRAM(S): 5 TABLET ORAL at 06:56

## 2023-01-01 RX ADMIN — PANTOPRAZOLE SODIUM 40 MILLIGRAM(S): 20 TABLET, DELAYED RELEASE ORAL at 17:29

## 2023-01-01 RX ADMIN — PANTOPRAZOLE SODIUM 40 MILLIGRAM(S): 20 TABLET, DELAYED RELEASE ORAL at 06:07

## 2023-01-01 RX ADMIN — Medication 105 MILLIGRAM(S): at 14:33

## 2023-01-01 RX ADMIN — Medication 1 CAPSULE(S): at 17:58

## 2023-01-01 RX ADMIN — SENNA PLUS 2 TABLET(S): 8.6 TABLET ORAL at 21:46

## 2023-01-01 RX ADMIN — OXYCODONE HYDROCHLORIDE 10 MILLIGRAM(S): 5 TABLET ORAL at 02:13

## 2023-01-01 RX ADMIN — Medication 30 MILLIGRAM(S): at 06:13

## 2023-01-01 RX ADMIN — Medication 1 GRAM(S): at 09:59

## 2023-01-01 RX ADMIN — OXYCODONE HYDROCHLORIDE 10 MILLIGRAM(S): 5 TABLET ORAL at 06:15

## 2023-01-01 RX ADMIN — Medication 1000 MILLIGRAM(S): at 09:30

## 2023-01-01 RX ADMIN — HEPARIN SODIUM 5000 UNIT(S): 5000 INJECTION INTRAVENOUS; SUBCUTANEOUS at 21:30

## 2023-01-01 RX ADMIN — Medication 650 MILLIGRAM(S): at 21:44

## 2023-01-01 RX ADMIN — Medication 1 CAPSULE(S): at 17:23

## 2023-01-01 RX ADMIN — Medication 20 MILLIEQUIVALENT(S): at 13:40

## 2023-01-01 RX ADMIN — Medication 5 MILLIGRAM(S): at 21:32

## 2023-01-01 RX ADMIN — APIXABAN 2.5 MILLIGRAM(S): 2.5 TABLET, FILM COATED ORAL at 06:54

## 2023-01-01 RX ADMIN — OXYCODONE HYDROCHLORIDE 10 MILLIGRAM(S): 5 TABLET ORAL at 00:42

## 2023-01-01 RX ADMIN — ERYTHROPOIETIN 20000 UNIT(S): 10000 INJECTION, SOLUTION INTRAVENOUS; SUBCUTANEOUS at 10:41

## 2023-01-01 RX ADMIN — OXYCODONE HYDROCHLORIDE 7.5 MILLIGRAM(S): 5 TABLET ORAL at 13:23

## 2023-01-01 RX ADMIN — Medication 30 MILLIGRAM(S): at 05:56

## 2023-01-01 RX ADMIN — Medication 1 CAPSULE(S): at 18:55

## 2023-01-01 RX ADMIN — PANTOPRAZOLE SODIUM 40 MILLIGRAM(S): 20 TABLET, DELAYED RELEASE ORAL at 05:09

## 2023-01-01 RX ADMIN — HEPARIN SODIUM 5000 UNIT(S): 5000 INJECTION INTRAVENOUS; SUBCUTANEOUS at 13:24

## 2023-01-01 RX ADMIN — CALCITRIOL 0.25 MICROGRAM(S): 0.5 CAPSULE ORAL at 17:52

## 2023-01-01 RX ADMIN — CALCITRIOL 0.25 MICROGRAM(S): 0.5 CAPSULE ORAL at 12:55

## 2023-01-01 RX ADMIN — Medication 1 TABLET(S): at 12:36

## 2023-01-01 RX ADMIN — OXYCODONE HYDROCHLORIDE 10 MILLIGRAM(S): 5 TABLET ORAL at 01:59

## 2023-01-01 RX ADMIN — OXYCODONE HYDROCHLORIDE 5 MILLIGRAM(S): 5 TABLET ORAL at 10:28

## 2023-01-01 RX ADMIN — APIXABAN 5 MILLIGRAM(S): 2.5 TABLET, FILM COATED ORAL at 11:35

## 2023-01-01 RX ADMIN — OXYCODONE HYDROCHLORIDE 10 MILLIGRAM(S): 5 TABLET ORAL at 01:15

## 2023-01-01 RX ADMIN — SENNA PLUS 2 TABLET(S): 8.6 TABLET ORAL at 01:48

## 2023-01-01 RX ADMIN — OXYCODONE HYDROCHLORIDE 7.5 MILLIGRAM(S): 5 TABLET ORAL at 12:57

## 2023-01-01 RX ADMIN — OXYCODONE HYDROCHLORIDE 10 MILLIGRAM(S): 5 TABLET ORAL at 05:03

## 2023-01-01 RX ADMIN — Medication 40 MILLIEQUIVALENT(S): at 11:20

## 2023-01-01 RX ADMIN — Medication 1 TABLET(S): at 12:48

## 2023-01-01 RX ADMIN — ERYTHROPOIETIN 10000 UNIT(S): 10000 INJECTION, SOLUTION INTRAVENOUS; SUBCUTANEOUS at 15:10

## 2023-01-01 RX ADMIN — Medication 100 MILLIGRAM(S): at 11:35

## 2023-01-01 RX ADMIN — OXYCODONE HYDROCHLORIDE 10 MILLIGRAM(S): 5 TABLET ORAL at 11:34

## 2023-01-01 RX ADMIN — PANTOPRAZOLE SODIUM 40 MILLIGRAM(S): 20 TABLET, DELAYED RELEASE ORAL at 05:44

## 2023-01-01 RX ADMIN — Medication 650 MILLIGRAM(S): at 06:18

## 2023-01-01 RX ADMIN — OXYCODONE HYDROCHLORIDE 10 MILLIGRAM(S): 5 TABLET ORAL at 18:32

## 2023-01-01 RX ADMIN — OXYCODONE HYDROCHLORIDE 7.5 MILLIGRAM(S): 5 TABLET ORAL at 21:33

## 2023-01-01 RX ADMIN — Medication 1 CAPSULE(S): at 17:48

## 2023-01-01 RX ADMIN — HEPARIN SODIUM 5000 UNIT(S): 5000 INJECTION INTRAVENOUS; SUBCUTANEOUS at 22:18

## 2023-01-01 RX ADMIN — IRON SUCROSE 100 MILLIGRAM(S): 20 INJECTION, SOLUTION INTRAVENOUS at 09:55

## 2023-01-01 RX ADMIN — HEPARIN SODIUM 16 UNIT(S)/HR: 5000 INJECTION INTRAVENOUS; SUBCUTANEOUS at 07:26

## 2023-01-01 RX ADMIN — Medication 1 CAPSULE(S): at 17:40

## 2023-01-01 RX ADMIN — OXYCODONE HYDROCHLORIDE 10 MILLIGRAM(S): 5 TABLET ORAL at 15:49

## 2023-01-01 RX ADMIN — Medication 1 TABLET(S): at 11:55

## 2023-01-01 RX ADMIN — Medication 1 CAPSULE(S): at 12:02

## 2023-01-01 RX ADMIN — OXYCODONE HYDROCHLORIDE 10 MILLIGRAM(S): 5 TABLET ORAL at 07:00

## 2023-01-01 RX ADMIN — OXYCODONE HYDROCHLORIDE 10 MILLIGRAM(S): 5 TABLET ORAL at 06:45

## 2023-01-01 RX ADMIN — OXYCODONE HYDROCHLORIDE 10 MILLIGRAM(S): 5 TABLET ORAL at 13:27

## 2023-01-01 RX ADMIN — OXYCODONE HYDROCHLORIDE 10 MILLIGRAM(S): 5 TABLET ORAL at 00:15

## 2023-01-01 RX ADMIN — SODIUM CHLORIDE 50 MILLILITER(S): 9 INJECTION, SOLUTION INTRAVENOUS at 04:43

## 2023-01-01 RX ADMIN — HEPARIN SODIUM 5000 UNIT(S): 5000 INJECTION INTRAVENOUS; SUBCUTANEOUS at 22:44

## 2023-01-01 RX ADMIN — OXYCODONE HYDROCHLORIDE 10 MILLIGRAM(S): 5 TABLET ORAL at 03:13

## 2023-01-01 RX ADMIN — OXYCODONE HYDROCHLORIDE 10 MILLIGRAM(S): 5 TABLET ORAL at 13:15

## 2023-01-01 RX ADMIN — Medication 50 MILLIEQUIVALENT(S): at 22:20

## 2023-01-01 RX ADMIN — CHLORHEXIDINE GLUCONATE 1 APPLICATION(S): 213 SOLUTION TOPICAL at 12:30

## 2023-01-01 RX ADMIN — Medication 5 MILLIGRAM(S): at 15:10

## 2023-01-01 RX ADMIN — HEPARIN SODIUM 5000 UNIT(S): 5000 INJECTION INTRAVENOUS; SUBCUTANEOUS at 06:10

## 2023-01-01 RX ADMIN — OXYCODONE HYDROCHLORIDE 10 MILLIGRAM(S): 5 TABLET ORAL at 03:28

## 2023-01-01 RX ADMIN — OXYCODONE HYDROCHLORIDE 10 MILLIGRAM(S): 5 TABLET ORAL at 12:48

## 2023-01-01 RX ADMIN — OXYCODONE HYDROCHLORIDE 7.5 MILLIGRAM(S): 5 TABLET ORAL at 10:26

## 2023-01-01 RX ADMIN — OXYCODONE HYDROCHLORIDE 10 MILLIGRAM(S): 5 TABLET ORAL at 11:15

## 2023-01-01 RX ADMIN — Medication 1 CAPSULE(S): at 12:37

## 2023-01-01 RX ADMIN — OXYCODONE HYDROCHLORIDE 5 MILLIGRAM(S): 5 TABLET ORAL at 09:29

## 2023-01-01 RX ADMIN — OXYCODONE HYDROCHLORIDE 10 MILLIGRAM(S): 5 TABLET ORAL at 18:12

## 2023-01-01 RX ADMIN — Medication 1 CAPSULE(S): at 18:13

## 2023-01-01 RX ADMIN — Medication 5 MILLIGRAM(S): at 21:13

## 2023-01-01 RX ADMIN — Medication 1 CAPSULE(S): at 12:08

## 2023-01-01 RX ADMIN — CHLORHEXIDINE GLUCONATE 1 APPLICATION(S): 213 SOLUTION TOPICAL at 05:15

## 2023-01-01 RX ADMIN — Medication 1 CAPSULE(S): at 16:43

## 2023-01-01 RX ADMIN — HEPARIN SODIUM 2200 UNIT(S)/HR: 5000 INJECTION INTRAVENOUS; SUBCUTANEOUS at 20:58

## 2023-01-01 RX ADMIN — OXYCODONE HYDROCHLORIDE 10 MILLIGRAM(S): 5 TABLET ORAL at 03:55

## 2023-01-01 RX ADMIN — LIDOCAINE 1 PATCH: 4 CREAM TOPICAL at 23:30

## 2023-01-01 RX ADMIN — Medication 30 MILLIGRAM(S): at 06:17

## 2023-01-01 RX ADMIN — Medication 1 GRAM(S): at 11:32

## 2023-01-01 RX ADMIN — OXYCODONE HYDROCHLORIDE 10 MILLIGRAM(S): 5 TABLET ORAL at 06:00

## 2023-01-01 RX ADMIN — PANTOPRAZOLE SODIUM 40 MILLIGRAM(S): 20 TABLET, DELAYED RELEASE ORAL at 18:05

## 2023-01-01 RX ADMIN — Medication 1 CAPSULE(S): at 17:50

## 2023-01-01 RX ADMIN — OXYCODONE HYDROCHLORIDE 10 MILLIGRAM(S): 5 TABLET ORAL at 22:55

## 2023-01-01 RX ADMIN — Medication 5 MILLIGRAM(S): at 22:24

## 2023-01-01 RX ADMIN — Medication 30 MILLIGRAM(S): at 05:36

## 2023-01-01 RX ADMIN — SODIUM CHLORIDE 60 MILLILITER(S): 9 INJECTION, SOLUTION INTRAVENOUS at 22:25

## 2023-01-01 RX ADMIN — OXYCODONE HYDROCHLORIDE 10 MILLIGRAM(S): 5 TABLET ORAL at 13:52

## 2023-01-01 RX ADMIN — Medication 1 TABLET(S): at 11:06

## 2023-01-01 RX ADMIN — HEPARIN SODIUM 2200 UNIT(S)/HR: 5000 INJECTION INTRAVENOUS; SUBCUTANEOUS at 20:34

## 2023-01-01 RX ADMIN — OXYCODONE HYDROCHLORIDE 10 MILLIGRAM(S): 5 TABLET ORAL at 18:21

## 2023-01-01 RX ADMIN — Medication 1 GRAM(S): at 17:52

## 2023-01-01 RX ADMIN — Medication 81 MILLIGRAM(S): at 11:33

## 2023-01-01 RX ADMIN — CHLORHEXIDINE GLUCONATE 1 APPLICATION(S): 213 SOLUTION TOPICAL at 14:33

## 2023-01-01 RX ADMIN — Medication 600 MILLIGRAM(S): at 13:40

## 2023-01-01 RX ADMIN — HEPARIN SODIUM 17 UNIT(S)/HR: 5000 INJECTION INTRAVENOUS; SUBCUTANEOUS at 04:31

## 2023-01-01 RX ADMIN — Medication 200 INTERNATIONAL UNIT(S): at 11:15

## 2023-01-01 RX ADMIN — OXYCODONE HYDROCHLORIDE 10 MILLIGRAM(S): 5 TABLET ORAL at 16:36

## 2023-01-01 RX ADMIN — OXYCODONE HYDROCHLORIDE 7.5 MILLIGRAM(S): 5 TABLET ORAL at 21:27

## 2023-01-01 RX ADMIN — OXYCODONE HYDROCHLORIDE 7.5 MILLIGRAM(S): 5 TABLET ORAL at 03:54

## 2023-01-01 RX ADMIN — PANTOPRAZOLE SODIUM 40 MILLIGRAM(S): 20 TABLET, DELAYED RELEASE ORAL at 18:08

## 2023-01-01 RX ADMIN — PIPERACILLIN AND TAZOBACTAM 25 GRAM(S): 4; .5 INJECTION, POWDER, LYOPHILIZED, FOR SOLUTION INTRAVENOUS at 06:01

## 2023-01-01 RX ADMIN — OXYCODONE HYDROCHLORIDE 5 MILLIGRAM(S): 5 TABLET ORAL at 22:03

## 2023-01-01 RX ADMIN — HEPARIN SODIUM 11 UNIT(S)/HR: 5000 INJECTION INTRAVENOUS; SUBCUTANEOUS at 20:12

## 2023-01-01 RX ADMIN — Medication 1 CAPSULE(S): at 13:11

## 2023-01-01 RX ADMIN — HEPARIN SODIUM 5000 UNIT(S): 5000 INJECTION INTRAVENOUS; SUBCUTANEOUS at 07:01

## 2023-01-01 RX ADMIN — ENOXAPARIN SODIUM 40 MILLIGRAM(S): 100 INJECTION SUBCUTANEOUS at 17:02

## 2023-01-01 RX ADMIN — Medication 600 MILLIGRAM(S): at 06:39

## 2023-01-01 RX ADMIN — Medication 1 GRAM(S): at 13:39

## 2023-01-01 RX ADMIN — OXYCODONE HYDROCHLORIDE 7.5 MILLIGRAM(S): 5 TABLET ORAL at 00:03

## 2023-01-01 RX ADMIN — PANTOPRAZOLE SODIUM 40 MILLIGRAM(S): 20 TABLET, DELAYED RELEASE ORAL at 17:38

## 2023-01-01 RX ADMIN — OXYCODONE HYDROCHLORIDE 10 MILLIGRAM(S): 5 TABLET ORAL at 20:29

## 2023-01-01 RX ADMIN — Medication 5 MILLIGRAM(S): at 06:39

## 2023-01-01 RX ADMIN — Medication 15 MILLILITER(S): at 06:19

## 2023-01-01 RX ADMIN — Medication 1 TABLET(S): at 13:51

## 2023-01-01 RX ADMIN — HEPARIN SODIUM 16 UNIT(S)/HR: 5000 INJECTION INTRAVENOUS; SUBCUTANEOUS at 01:35

## 2023-01-01 RX ADMIN — CHLORHEXIDINE GLUCONATE 1 APPLICATION(S): 213 SOLUTION TOPICAL at 04:12

## 2023-01-01 RX ADMIN — Medication 100 MILLIGRAM(S): at 13:21

## 2023-01-01 RX ADMIN — APIXABAN 5 MILLIGRAM(S): 2.5 TABLET, FILM COATED ORAL at 18:01

## 2023-01-01 RX ADMIN — OXYCODONE HYDROCHLORIDE 10 MILLIGRAM(S): 5 TABLET ORAL at 16:02

## 2023-01-01 RX ADMIN — Medication 1 TABLET(S): at 12:54

## 2023-01-01 RX ADMIN — Medication 30 MILLILITER(S): at 12:39

## 2023-01-01 RX ADMIN — CHLORHEXIDINE GLUCONATE 1 APPLICATION(S): 213 SOLUTION TOPICAL at 17:51

## 2023-01-01 RX ADMIN — GABAPENTIN 100 MILLIGRAM(S): 400 CAPSULE ORAL at 21:39

## 2023-01-01 RX ADMIN — ATORVASTATIN CALCIUM 40 MILLIGRAM(S): 80 TABLET, FILM COATED ORAL at 21:46

## 2023-01-01 RX ADMIN — LIDOCAINE 1 PATCH: 4 CREAM TOPICAL at 13:51

## 2023-01-01 RX ADMIN — Medication 1 TABLET(S): at 11:16

## 2023-01-01 RX ADMIN — OXYCODONE HYDROCHLORIDE 10 MILLIGRAM(S): 5 TABLET ORAL at 19:30

## 2023-01-01 RX ADMIN — MAGNESIUM OXIDE 400 MG ORAL TABLET 400 MILLIGRAM(S): 241.3 TABLET ORAL at 11:54

## 2023-01-01 RX ADMIN — OXYCODONE HYDROCHLORIDE 10 MILLIGRAM(S): 5 TABLET ORAL at 22:39

## 2023-01-01 RX ADMIN — CHLORHEXIDINE GLUCONATE 1 APPLICATION(S): 213 SOLUTION TOPICAL at 17:19

## 2023-01-01 RX ADMIN — OXYCODONE HYDROCHLORIDE 10 MILLIGRAM(S): 5 TABLET ORAL at 03:16

## 2023-01-01 RX ADMIN — HEPARIN SODIUM 5000 UNIT(S): 5000 INJECTION INTRAVENOUS; SUBCUTANEOUS at 06:09

## 2023-01-01 RX ADMIN — OXYCODONE HYDROCHLORIDE 7.5 MILLIGRAM(S): 5 TABLET ORAL at 05:21

## 2023-01-01 RX ADMIN — Medication 5 MILLIGRAM(S): at 23:13

## 2023-01-01 RX ADMIN — Medication 1625 MILLIGRAM(S): at 06:00

## 2023-01-01 RX ADMIN — SEVELAMER CARBONATE 800 MILLIGRAM(S): 2400 POWDER, FOR SUSPENSION ORAL at 22:37

## 2023-01-01 RX ADMIN — Medication 1 CAPSULE(S): at 18:21

## 2023-01-01 RX ADMIN — OXYCODONE HYDROCHLORIDE 10 MILLIGRAM(S): 5 TABLET ORAL at 09:21

## 2023-01-01 RX ADMIN — OXYCODONE HYDROCHLORIDE 7.5 MILLIGRAM(S): 5 TABLET ORAL at 14:38

## 2023-01-01 RX ADMIN — HEPARIN SODIUM 5000 UNIT(S): 5000 INJECTION INTRAVENOUS; SUBCUTANEOUS at 06:03

## 2023-01-01 RX ADMIN — OXYCODONE HYDROCHLORIDE 10 MILLIGRAM(S): 5 TABLET ORAL at 05:50

## 2023-01-01 RX ADMIN — APIXABAN 5 MILLIGRAM(S): 2.5 TABLET, FILM COATED ORAL at 09:27

## 2023-01-01 RX ADMIN — Medication 1 CAPSULE(S): at 11:35

## 2023-01-01 RX ADMIN — Medication 1 TABLET(S): at 13:16

## 2023-01-01 RX ADMIN — OXYCODONE HYDROCHLORIDE 10 MILLIGRAM(S): 5 TABLET ORAL at 22:51

## 2023-01-01 RX ADMIN — OXYCODONE HYDROCHLORIDE 10 MILLIGRAM(S): 5 TABLET ORAL at 09:00

## 2023-01-01 RX ADMIN — PANTOPRAZOLE SODIUM 40 MILLIGRAM(S): 20 TABLET, DELAYED RELEASE ORAL at 18:46

## 2023-01-01 RX ADMIN — OXYCODONE HYDROCHLORIDE 10 MILLIGRAM(S): 5 TABLET ORAL at 01:26

## 2023-01-01 RX ADMIN — LIDOCAINE 1 PATCH: 4 CREAM TOPICAL at 12:49

## 2023-01-01 RX ADMIN — CALCITRIOL 0.25 MICROGRAM(S): 0.5 CAPSULE ORAL at 12:45

## 2023-01-01 RX ADMIN — OXYCODONE HYDROCHLORIDE 10 MILLIGRAM(S): 5 TABLET ORAL at 00:13

## 2023-01-01 RX ADMIN — CHLORHEXIDINE GLUCONATE 1 APPLICATION(S): 213 SOLUTION TOPICAL at 18:45

## 2023-01-01 RX ADMIN — OXYCODONE HYDROCHLORIDE 10 MILLIGRAM(S): 5 TABLET ORAL at 17:32

## 2023-01-01 RX ADMIN — Medication 1 CAPSULE(S): at 11:09

## 2023-01-01 RX ADMIN — Medication 50 MILLILITER(S): at 18:46

## 2023-01-01 RX ADMIN — OXYCODONE HYDROCHLORIDE 7.5 MILLIGRAM(S): 5 TABLET ORAL at 09:09

## 2023-01-01 RX ADMIN — OXYCODONE HYDROCHLORIDE 10 MILLIGRAM(S): 5 TABLET ORAL at 23:15

## 2023-01-01 RX ADMIN — Medication 1 CAPSULE(S): at 08:13

## 2023-01-01 RX ADMIN — OXYCODONE HYDROCHLORIDE 10 MILLIGRAM(S): 5 TABLET ORAL at 15:40

## 2023-01-01 RX ADMIN — CHLORHEXIDINE GLUCONATE 1 APPLICATION(S): 213 SOLUTION TOPICAL at 17:50

## 2023-01-01 RX ADMIN — APIXABAN 5 MILLIGRAM(S): 2.5 TABLET, FILM COATED ORAL at 22:37

## 2023-01-01 RX ADMIN — OXYCODONE HYDROCHLORIDE 10 MILLIGRAM(S): 5 TABLET ORAL at 00:54

## 2023-01-01 RX ADMIN — OXYCODONE HYDROCHLORIDE 7.5 MILLIGRAM(S): 5 TABLET ORAL at 16:20

## 2023-01-01 RX ADMIN — OXYCODONE HYDROCHLORIDE 7.5 MILLIGRAM(S): 5 TABLET ORAL at 12:51

## 2023-01-01 RX ADMIN — Medication 1300 MILLIGRAM(S): at 06:38

## 2023-01-01 RX ADMIN — ATORVASTATIN CALCIUM 40 MILLIGRAM(S): 80 TABLET, FILM COATED ORAL at 21:39

## 2023-01-01 RX ADMIN — ERYTHROPOIETIN 10000 UNIT(S): 10000 INJECTION, SOLUTION INTRAVENOUS; SUBCUTANEOUS at 12:37

## 2023-01-01 RX ADMIN — PANTOPRAZOLE SODIUM 40 MILLIGRAM(S): 20 TABLET, DELAYED RELEASE ORAL at 08:03

## 2023-01-01 RX ADMIN — Medication 105 MILLIGRAM(S): at 07:05

## 2023-01-01 RX ADMIN — Medication 75 MILLILITER(S): at 22:19

## 2023-01-01 RX ADMIN — HEPARIN SODIUM 1900 UNIT(S)/HR: 5000 INJECTION INTRAVENOUS; SUBCUTANEOUS at 10:14

## 2023-01-01 RX ADMIN — SEVELAMER CARBONATE 800 MILLIGRAM(S): 2400 POWDER, FOR SUSPENSION ORAL at 13:46

## 2023-01-01 RX ADMIN — HEPARIN SODIUM 14 UNIT(S)/HR: 5000 INJECTION INTRAVENOUS; SUBCUTANEOUS at 21:50

## 2023-01-01 RX ADMIN — Medication 5 MILLIGRAM(S): at 06:00

## 2023-01-01 RX ADMIN — OXYCODONE HYDROCHLORIDE 5 MILLIGRAM(S): 5 TABLET ORAL at 11:11

## 2023-01-01 RX ADMIN — Medication 5 MILLIGRAM(S): at 05:08

## 2023-01-01 RX ADMIN — OXYCODONE HYDROCHLORIDE 10 MILLIGRAM(S): 5 TABLET ORAL at 13:36

## 2023-01-01 RX ADMIN — HEPARIN SODIUM 5000 UNIT(S): 5000 INJECTION INTRAVENOUS; SUBCUTANEOUS at 14:39

## 2023-01-01 RX ADMIN — HEPARIN SODIUM 10 UNIT(S)/HR: 5000 INJECTION INTRAVENOUS; SUBCUTANEOUS at 08:57

## 2023-01-01 RX ADMIN — OXYCODONE HYDROCHLORIDE 7.5 MILLIGRAM(S): 5 TABLET ORAL at 16:45

## 2023-01-01 RX ADMIN — Medication 1 MILLIGRAM(S): at 13:38

## 2023-01-01 RX ADMIN — HEPARIN SODIUM 5000 UNIT(S): 5000 INJECTION INTRAVENOUS; SUBCUTANEOUS at 07:35

## 2023-01-01 RX ADMIN — Medication 1 TABLET(S): at 11:45

## 2023-01-01 RX ADMIN — Medication 5 MILLIGRAM(S): at 15:05

## 2023-01-01 RX ADMIN — OXYCODONE HYDROCHLORIDE 10 MILLIGRAM(S): 5 TABLET ORAL at 11:14

## 2023-01-01 RX ADMIN — Medication 1 APPLICATION(S): at 12:55

## 2023-01-01 RX ADMIN — Medication 1 MILLIGRAM(S): at 14:19

## 2023-01-01 RX ADMIN — Medication 40 MILLIEQUIVALENT(S): at 15:03

## 2023-01-01 RX ADMIN — OXYCODONE HYDROCHLORIDE 10 MILLIGRAM(S): 5 TABLET ORAL at 17:19

## 2023-01-01 RX ADMIN — FENTANYL CITRATE 100 MICROGRAM(S): 50 INJECTION INTRAVENOUS at 17:18

## 2023-01-01 RX ADMIN — OXYCODONE HYDROCHLORIDE 5 MILLIGRAM(S): 5 TABLET ORAL at 17:00

## 2023-01-01 RX ADMIN — APIXABAN 5 MILLIGRAM(S): 2.5 TABLET, FILM COATED ORAL at 09:42

## 2023-01-01 RX ADMIN — PANTOPRAZOLE SODIUM 40 MILLIGRAM(S): 20 TABLET, DELAYED RELEASE ORAL at 06:13

## 2023-01-01 RX ADMIN — OXYCODONE HYDROCHLORIDE 10 MILLIGRAM(S): 5 TABLET ORAL at 22:05

## 2023-01-01 RX ADMIN — SEVELAMER CARBONATE 800 MILLIGRAM(S): 2400 POWDER, FOR SUSPENSION ORAL at 05:41

## 2023-01-01 RX ADMIN — OXYCODONE HYDROCHLORIDE 10 MILLIGRAM(S): 5 TABLET ORAL at 08:33

## 2023-01-01 RX ADMIN — OXYCODONE HYDROCHLORIDE 10 MILLIGRAM(S): 5 TABLET ORAL at 16:00

## 2023-01-01 RX ADMIN — HEPARIN SODIUM 1900 UNIT(S)/HR: 5000 INJECTION INTRAVENOUS; SUBCUTANEOUS at 20:08

## 2023-01-01 RX ADMIN — Medication 1 CAPSULE(S): at 07:31

## 2023-01-01 RX ADMIN — OXYCODONE HYDROCHLORIDE 10 MILLIGRAM(S): 5 TABLET ORAL at 08:35

## 2023-01-01 RX ADMIN — Medication 1 CAPSULE(S): at 07:46

## 2023-01-01 RX ADMIN — PANTOPRAZOLE SODIUM 40 MILLIGRAM(S): 20 TABLET, DELAYED RELEASE ORAL at 18:06

## 2023-01-01 RX ADMIN — OXYCODONE HYDROCHLORIDE 10 MILLIGRAM(S): 5 TABLET ORAL at 17:53

## 2023-01-01 RX ADMIN — OXYCODONE HYDROCHLORIDE 10 MILLIGRAM(S): 5 TABLET ORAL at 12:54

## 2023-01-01 RX ADMIN — APIXABAN 2.5 MILLIGRAM(S): 2.5 TABLET, FILM COATED ORAL at 06:24

## 2023-01-01 RX ADMIN — OXYCODONE HYDROCHLORIDE 7.5 MILLIGRAM(S): 5 TABLET ORAL at 11:27

## 2023-01-01 RX ADMIN — HEPARIN SODIUM 2300 UNIT(S)/HR: 5000 INJECTION INTRAVENOUS; SUBCUTANEOUS at 15:49

## 2023-01-01 RX ADMIN — Medication 100 MILLIGRAM(S): at 14:40

## 2023-01-01 RX ADMIN — OXYCODONE HYDROCHLORIDE 10 MILLIGRAM(S): 5 TABLET ORAL at 01:56

## 2023-01-01 RX ADMIN — Medication 5 MILLIGRAM(S): at 05:58

## 2023-01-01 RX ADMIN — OXYCODONE HYDROCHLORIDE 7.5 MILLIGRAM(S): 5 TABLET ORAL at 23:45

## 2023-01-01 RX ADMIN — OXYCODONE HYDROCHLORIDE 10 MILLIGRAM(S): 5 TABLET ORAL at 23:10

## 2023-01-01 RX ADMIN — OXYCODONE HYDROCHLORIDE 10 MILLIGRAM(S): 5 TABLET ORAL at 14:13

## 2023-01-01 RX ADMIN — Medication 1 CAPSULE(S): at 18:45

## 2023-01-01 RX ADMIN — OXYCODONE HYDROCHLORIDE 7.5 MILLIGRAM(S): 5 TABLET ORAL at 06:35

## 2023-01-01 RX ADMIN — LIDOCAINE 1 PATCH: 4 CREAM TOPICAL at 18:37

## 2023-01-01 RX ADMIN — Medication 1 CAPSULE(S): at 09:30

## 2023-01-01 RX ADMIN — Medication 1 CAPSULE(S): at 12:54

## 2023-01-01 RX ADMIN — HEPARIN SODIUM 5000 UNIT(S): 5000 INJECTION INTRAVENOUS; SUBCUTANEOUS at 23:47

## 2023-01-01 RX ADMIN — OXYCODONE HYDROCHLORIDE 10 MILLIGRAM(S): 5 TABLET ORAL at 11:00

## 2023-01-01 RX ADMIN — PANTOPRAZOLE SODIUM 40 MILLIGRAM(S): 20 TABLET, DELAYED RELEASE ORAL at 05:36

## 2023-01-01 RX ADMIN — Medication 1 TABLET(S): at 11:41

## 2023-01-01 RX ADMIN — OXYCODONE HYDROCHLORIDE 10 MILLIGRAM(S): 5 TABLET ORAL at 01:13

## 2023-01-01 RX ADMIN — Medication 1 APPLICATION(S): at 11:34

## 2023-01-01 RX ADMIN — OXYCODONE HYDROCHLORIDE 10 MILLIGRAM(S): 5 TABLET ORAL at 13:14

## 2023-01-01 RX ADMIN — OXYCODONE HYDROCHLORIDE 10 MILLIGRAM(S): 5 TABLET ORAL at 18:06

## 2023-01-01 RX ADMIN — OXYCODONE HYDROCHLORIDE 10 MILLIGRAM(S): 5 TABLET ORAL at 12:16

## 2023-01-01 RX ADMIN — OXYCODONE HYDROCHLORIDE 7.5 MILLIGRAM(S): 5 TABLET ORAL at 08:18

## 2023-01-01 RX ADMIN — OXYCODONE HYDROCHLORIDE 10 MILLIGRAM(S): 5 TABLET ORAL at 21:53

## 2023-01-01 RX ADMIN — MORPHINE SULFATE 2 MILLIGRAM(S): 50 CAPSULE, EXTENDED RELEASE ORAL at 03:20

## 2023-01-01 RX ADMIN — OXYCODONE HYDROCHLORIDE 10 MILLIGRAM(S): 5 TABLET ORAL at 12:58

## 2023-01-01 RX ADMIN — OXYCODONE HYDROCHLORIDE 10 MILLIGRAM(S): 5 TABLET ORAL at 20:30

## 2023-01-01 RX ADMIN — Medication 5 MILLIGRAM(S): at 05:11

## 2023-01-01 RX ADMIN — CHLORHEXIDINE GLUCONATE 1 APPLICATION(S): 213 SOLUTION TOPICAL at 05:44

## 2023-01-01 RX ADMIN — CHLORHEXIDINE GLUCONATE 1 APPLICATION(S): 213 SOLUTION TOPICAL at 05:54

## 2023-01-01 RX ADMIN — OXYCODONE HYDROCHLORIDE 10 MILLIGRAM(S): 5 TABLET ORAL at 17:16

## 2023-01-01 RX ADMIN — Medication 1300 MILLIGRAM(S): at 15:47

## 2023-01-01 RX ADMIN — Medication 100 MILLIEQUIVALENT(S): at 13:28

## 2023-01-01 RX ADMIN — Medication 5 MILLIGRAM(S): at 22:37

## 2023-01-01 RX ADMIN — APIXABAN 5 MILLIGRAM(S): 2.5 TABLET, FILM COATED ORAL at 14:16

## 2023-01-01 RX ADMIN — OXYCODONE HYDROCHLORIDE 10 MILLIGRAM(S): 5 TABLET ORAL at 18:39

## 2023-01-01 RX ADMIN — Medication 100 MILLIGRAM(S): at 12:17

## 2023-01-01 RX ADMIN — PANTOPRAZOLE SODIUM 40 MILLIGRAM(S): 20 TABLET, DELAYED RELEASE ORAL at 06:20

## 2023-01-01 RX ADMIN — OXYCODONE HYDROCHLORIDE 7.5 MILLIGRAM(S): 5 TABLET ORAL at 14:00

## 2023-01-01 RX ADMIN — OXYCODONE HYDROCHLORIDE 10 MILLIGRAM(S): 5 TABLET ORAL at 16:44

## 2023-01-01 RX ADMIN — Medication 1 CAPSULE(S): at 09:00

## 2023-01-01 RX ADMIN — OXYCODONE HYDROCHLORIDE 10 MILLIGRAM(S): 5 TABLET ORAL at 22:12

## 2023-01-01 RX ADMIN — Medication 1 CAPSULE(S): at 12:59

## 2023-01-01 RX ADMIN — Medication 100 GRAM(S): at 18:17

## 2023-01-01 RX ADMIN — OXYCODONE HYDROCHLORIDE 5 MILLIGRAM(S): 5 TABLET ORAL at 07:54

## 2023-01-01 RX ADMIN — Medication 1 CAPSULE(S): at 16:48

## 2023-01-01 RX ADMIN — CALCITRIOL 0.5 MICROGRAM(S): 0.5 CAPSULE ORAL at 13:07

## 2023-01-01 RX ADMIN — Medication 5 MILLIGRAM(S): at 13:29

## 2023-01-01 RX ADMIN — HYDROMORPHONE HYDROCHLORIDE 1 MILLIGRAM(S): 2 INJECTION INTRAMUSCULAR; INTRAVENOUS; SUBCUTANEOUS at 15:30

## 2023-01-01 RX ADMIN — TRAMADOL HYDROCHLORIDE 25 MILLIGRAM(S): 50 TABLET ORAL at 03:41

## 2023-01-01 RX ADMIN — OXYCODONE HYDROCHLORIDE 10 MILLIGRAM(S): 5 TABLET ORAL at 15:06

## 2023-01-01 RX ADMIN — OXYCODONE HYDROCHLORIDE 10 MILLIGRAM(S): 5 TABLET ORAL at 14:43

## 2023-01-01 RX ADMIN — Medication 1 CAPSULE(S): at 12:49

## 2023-01-01 RX ADMIN — Medication 1 TABLET(S): at 11:36

## 2023-01-01 RX ADMIN — OXYCODONE HYDROCHLORIDE 10 MILLIGRAM(S): 5 TABLET ORAL at 18:54

## 2023-01-01 RX ADMIN — Medication 1 MILLIGRAM(S): at 17:32

## 2023-01-01 RX ADMIN — OXYCODONE HYDROCHLORIDE 10 MILLIGRAM(S): 5 TABLET ORAL at 12:45

## 2023-01-01 RX ADMIN — OXYCODONE HYDROCHLORIDE 7.5 MILLIGRAM(S): 5 TABLET ORAL at 02:20

## 2023-01-01 RX ADMIN — OXYCODONE HYDROCHLORIDE 10 MILLIGRAM(S): 5 TABLET ORAL at 04:02

## 2023-01-01 RX ADMIN — PIPERACILLIN AND TAZOBACTAM 25 GRAM(S): 4; .5 INJECTION, POWDER, LYOPHILIZED, FOR SOLUTION INTRAVENOUS at 23:31

## 2023-01-01 RX ADMIN — Medication 1 CAPSULE(S): at 07:37

## 2023-01-01 RX ADMIN — CHLORHEXIDINE GLUCONATE 1 APPLICATION(S): 213 SOLUTION TOPICAL at 12:27

## 2023-01-01 RX ADMIN — OXYCODONE HYDROCHLORIDE 5 MILLIGRAM(S): 5 TABLET ORAL at 06:55

## 2023-01-01 RX ADMIN — Medication 1300 MILLIGRAM(S): at 06:49

## 2023-01-01 RX ADMIN — OXYCODONE HYDROCHLORIDE 10 MILLIGRAM(S): 5 TABLET ORAL at 12:30

## 2023-01-01 RX ADMIN — OXYCODONE HYDROCHLORIDE 10 MILLIGRAM(S): 5 TABLET ORAL at 23:37

## 2023-01-01 RX ADMIN — OXYCODONE HYDROCHLORIDE 10 MILLIGRAM(S): 5 TABLET ORAL at 16:47

## 2023-01-01 RX ADMIN — OXYCODONE HYDROCHLORIDE 10 MILLIGRAM(S): 5 TABLET ORAL at 04:30

## 2023-01-01 RX ADMIN — Medication 1 TABLET(S): at 11:20

## 2023-01-01 RX ADMIN — OXYCODONE HYDROCHLORIDE 10 MILLIGRAM(S): 5 TABLET ORAL at 06:03

## 2023-01-01 RX ADMIN — Medication 1 GRAM(S): at 18:01

## 2023-01-01 RX ADMIN — Medication 1 MILLIGRAM(S): at 12:37

## 2023-01-01 RX ADMIN — SODIUM CHLORIDE 1000 MILLILITER(S): 9 INJECTION INTRAMUSCULAR; INTRAVENOUS; SUBCUTANEOUS at 19:58

## 2023-01-01 RX ADMIN — OXYCODONE HYDROCHLORIDE 7.5 MILLIGRAM(S): 5 TABLET ORAL at 05:08

## 2023-01-01 RX ADMIN — Medication 1 CAPSULE(S): at 17:29

## 2023-01-01 RX ADMIN — PANTOPRAZOLE SODIUM 40 MILLIGRAM(S): 20 TABLET, DELAYED RELEASE ORAL at 07:26

## 2023-01-01 RX ADMIN — PANTOPRAZOLE SODIUM 40 MILLIGRAM(S): 20 TABLET, DELAYED RELEASE ORAL at 06:44

## 2023-01-01 RX ADMIN — Medication 1 CAPSULE(S): at 14:35

## 2023-01-01 RX ADMIN — OXYCODONE HYDROCHLORIDE 10 MILLIGRAM(S): 5 TABLET ORAL at 15:30

## 2023-01-01 RX ADMIN — OXYCODONE HYDROCHLORIDE 10 MILLIGRAM(S): 5 TABLET ORAL at 12:00

## 2023-01-01 RX ADMIN — HEPARIN SODIUM 5000 UNIT(S): 5000 INJECTION INTRAVENOUS; SUBCUTANEOUS at 05:12

## 2023-01-01 RX ADMIN — Medication 5 MILLIGRAM(S): at 12:33

## 2023-01-01 RX ADMIN — HYDROMORPHONE HYDROCHLORIDE 0.2 MILLIGRAM(S): 2 INJECTION INTRAMUSCULAR; INTRAVENOUS; SUBCUTANEOUS at 22:12

## 2023-01-01 RX ADMIN — OXYCODONE HYDROCHLORIDE 10 MILLIGRAM(S): 5 TABLET ORAL at 05:11

## 2023-01-01 RX ADMIN — HEPARIN SODIUM 5000 UNIT(S): 5000 INJECTION INTRAVENOUS; SUBCUTANEOUS at 22:51

## 2023-01-01 RX ADMIN — OXYCODONE HYDROCHLORIDE 10 MILLIGRAM(S): 5 TABLET ORAL at 11:20

## 2023-01-01 RX ADMIN — HEPARIN SODIUM 5000 UNIT(S): 5000 INJECTION INTRAVENOUS; SUBCUTANEOUS at 06:42

## 2023-01-01 RX ADMIN — OXYCODONE HYDROCHLORIDE 10 MILLIGRAM(S): 5 TABLET ORAL at 22:08

## 2023-01-01 RX ADMIN — CALCITRIOL 0.5 MICROGRAM(S): 0.5 CAPSULE ORAL at 13:38

## 2023-01-01 RX ADMIN — ONDANSETRON 4 MILLIGRAM(S): 8 TABLET, FILM COATED ORAL at 11:58

## 2023-01-01 RX ADMIN — OXYCODONE HYDROCHLORIDE 10 MILLIGRAM(S): 5 TABLET ORAL at 14:46

## 2023-01-01 RX ADMIN — PANTOPRAZOLE SODIUM 40 MILLIGRAM(S): 20 TABLET, DELAYED RELEASE ORAL at 20:36

## 2023-01-01 RX ADMIN — OXYCODONE HYDROCHLORIDE 10 MILLIGRAM(S): 5 TABLET ORAL at 01:48

## 2023-01-01 RX ADMIN — OXYCODONE HYDROCHLORIDE 10 MILLIGRAM(S): 5 TABLET ORAL at 21:05

## 2023-01-01 RX ADMIN — PANTOPRAZOLE SODIUM 40 MILLIGRAM(S): 20 TABLET, DELAYED RELEASE ORAL at 18:14

## 2023-01-01 RX ADMIN — OXYCODONE HYDROCHLORIDE 7.5 MILLIGRAM(S): 5 TABLET ORAL at 17:16

## 2023-01-01 RX ADMIN — Medication 1 TABLET(S): at 14:40

## 2023-01-01 RX ADMIN — Medication 1300 MILLIGRAM(S): at 12:41

## 2023-01-01 RX ADMIN — Medication 1 MILLIGRAM(S): at 15:36

## 2023-01-01 RX ADMIN — HYDROMORPHONE HYDROCHLORIDE 1 MILLIGRAM(S): 2 INJECTION INTRAMUSCULAR; INTRAVENOUS; SUBCUTANEOUS at 13:24

## 2023-01-01 RX ADMIN — OXYCODONE HYDROCHLORIDE 10 MILLIGRAM(S): 5 TABLET ORAL at 02:57

## 2023-01-01 RX ADMIN — OXYCODONE HYDROCHLORIDE 10 MILLIGRAM(S): 5 TABLET ORAL at 04:11

## 2023-01-01 RX ADMIN — Medication 1 CAPSULE(S): at 13:07

## 2023-01-01 RX ADMIN — Medication 1 CAPSULE(S): at 19:57

## 2023-01-01 RX ADMIN — Medication 1 CAPSULE(S): at 13:15

## 2023-01-01 RX ADMIN — PANTOPRAZOLE SODIUM 40 MILLIGRAM(S): 20 TABLET, DELAYED RELEASE ORAL at 06:29

## 2023-01-01 RX ADMIN — OXYCODONE HYDROCHLORIDE 10 MILLIGRAM(S): 5 TABLET ORAL at 05:36

## 2023-01-01 RX ADMIN — CALCITRIOL 0.5 MICROGRAM(S): 0.5 CAPSULE ORAL at 14:19

## 2023-01-01 RX ADMIN — ONDANSETRON 4 MILLIGRAM(S): 8 TABLET, FILM COATED ORAL at 19:05

## 2023-01-01 RX ADMIN — Medication 100 MILLIGRAM(S): at 18:25

## 2023-01-01 RX ADMIN — CALCITRIOL 0.5 MICROGRAM(S): 0.5 CAPSULE ORAL at 12:54

## 2023-01-01 RX ADMIN — Medication 1 CAPSULE(S): at 09:07

## 2023-01-01 RX ADMIN — OXYCODONE HYDROCHLORIDE 10 MILLIGRAM(S): 5 TABLET ORAL at 12:04

## 2023-01-01 RX ADMIN — APIXABAN 5 MILLIGRAM(S): 2.5 TABLET, FILM COATED ORAL at 17:40

## 2023-01-01 RX ADMIN — Medication 1 CAPSULE(S): at 12:28

## 2023-01-01 RX ADMIN — CHLORHEXIDINE GLUCONATE 1 APPLICATION(S): 213 SOLUTION TOPICAL at 11:07

## 2023-01-01 RX ADMIN — Medication 40 MILLIEQUIVALENT(S): at 13:28

## 2023-01-01 RX ADMIN — OXYCODONE HYDROCHLORIDE 10 MILLIGRAM(S): 5 TABLET ORAL at 18:50

## 2023-01-01 RX ADMIN — Medication 100 MILLIGRAM(S): at 13:00

## 2023-01-01 RX ADMIN — PANTOPRAZOLE SODIUM 40 MILLIGRAM(S): 20 TABLET, DELAYED RELEASE ORAL at 05:10

## 2023-01-01 RX ADMIN — Medication 1 CAPSULE(S): at 07:56

## 2023-01-01 RX ADMIN — Medication 1 TABLET(S): at 18:25

## 2023-01-01 RX ADMIN — Medication 1 CAPSULE(S): at 14:22

## 2023-01-01 RX ADMIN — Medication 5 MILLIGRAM(S): at 14:59

## 2023-01-01 RX ADMIN — MORPHINE SULFATE 2 MILLIGRAM(S): 50 CAPSULE, EXTENDED RELEASE ORAL at 07:15

## 2023-01-01 RX ADMIN — MUPIROCIN 1 APPLICATION(S): 20 OINTMENT TOPICAL at 18:46

## 2023-01-01 RX ADMIN — HEPARIN SODIUM 15 UNIT(S)/HR: 5000 INJECTION INTRAVENOUS; SUBCUTANEOUS at 21:00

## 2023-01-01 RX ADMIN — HEPARIN SODIUM 5000 UNIT(S): 5000 INJECTION INTRAVENOUS; SUBCUTANEOUS at 18:32

## 2023-01-01 RX ADMIN — Medication 1625 MILLIGRAM(S): at 14:42

## 2023-01-01 RX ADMIN — OXYCODONE HYDROCHLORIDE 10 MILLIGRAM(S): 5 TABLET ORAL at 01:04

## 2023-01-01 RX ADMIN — OXYCODONE HYDROCHLORIDE 10 MILLIGRAM(S): 5 TABLET ORAL at 06:09

## 2023-01-01 RX ADMIN — OXYCODONE HYDROCHLORIDE 10 MILLIGRAM(S): 5 TABLET ORAL at 22:25

## 2023-01-01 RX ADMIN — Medication 1 TABLET(S): at 14:27

## 2023-01-01 RX ADMIN — Medication 100 MILLIGRAM(S): at 18:47

## 2023-01-01 RX ADMIN — OXYCODONE HYDROCHLORIDE 7.5 MILLIGRAM(S): 5 TABLET ORAL at 05:40

## 2023-01-01 RX ADMIN — HEPARIN SODIUM 16 UNIT(S)/HR: 5000 INJECTION INTRAVENOUS; SUBCUTANEOUS at 17:21

## 2023-01-01 RX ADMIN — OXYCODONE HYDROCHLORIDE 10 MILLIGRAM(S): 5 TABLET ORAL at 12:41

## 2023-01-01 RX ADMIN — HEPARIN SODIUM 5000 UNIT(S): 5000 INJECTION INTRAVENOUS; SUBCUTANEOUS at 05:56

## 2023-01-01 RX ADMIN — Medication 1 CAPSULE(S): at 09:25

## 2023-01-01 RX ADMIN — Medication 1 CAPSULE(S): at 11:53

## 2023-01-01 RX ADMIN — LIDOCAINE 1 PATCH: 4 CREAM TOPICAL at 00:04

## 2023-01-01 RX ADMIN — Medication 100 MILLIGRAM(S): at 12:48

## 2023-01-01 RX ADMIN — Medication 650 MILLIGRAM(S): at 14:09

## 2023-01-01 RX ADMIN — OXYCODONE HYDROCHLORIDE 10 MILLIGRAM(S): 5 TABLET ORAL at 14:30

## 2023-01-01 RX ADMIN — OXYCODONE HYDROCHLORIDE 10 MILLIGRAM(S): 5 TABLET ORAL at 10:00

## 2023-01-01 RX ADMIN — Medication 50 MILLILITER(S): at 10:28

## 2023-01-01 RX ADMIN — OXYCODONE HYDROCHLORIDE 10 MILLIGRAM(S): 5 TABLET ORAL at 21:49

## 2023-01-01 RX ADMIN — Medication 1 APPLICATION(S): at 12:31

## 2023-01-01 RX ADMIN — OXYCODONE HYDROCHLORIDE 7.5 MILLIGRAM(S): 5 TABLET ORAL at 00:15

## 2023-01-01 RX ADMIN — OXYCODONE HYDROCHLORIDE 7.5 MILLIGRAM(S): 5 TABLET ORAL at 16:30

## 2023-01-01 RX ADMIN — Medication 1 GRAM(S): at 12:44

## 2023-01-01 RX ADMIN — Medication 30 MILLIGRAM(S): at 05:58

## 2023-01-01 RX ADMIN — OXYCODONE HYDROCHLORIDE 10 MILLIGRAM(S): 5 TABLET ORAL at 04:24

## 2023-01-01 RX ADMIN — HEPARIN SODIUM 5000 UNIT(S): 5000 INJECTION INTRAVENOUS; SUBCUTANEOUS at 22:56

## 2023-01-01 RX ADMIN — CHLORHEXIDINE GLUCONATE 1 APPLICATION(S): 213 SOLUTION TOPICAL at 07:26

## 2023-01-01 RX ADMIN — OXYCODONE HYDROCHLORIDE 10 MILLIGRAM(S): 5 TABLET ORAL at 13:24

## 2023-01-01 RX ADMIN — HEPARIN SODIUM 5000 UNIT(S): 5000 INJECTION INTRAVENOUS; SUBCUTANEOUS at 05:44

## 2023-01-01 RX ADMIN — OXYCODONE HYDROCHLORIDE 10 MILLIGRAM(S): 5 TABLET ORAL at 23:40

## 2023-01-01 RX ADMIN — HEPARIN SODIUM 16 UNIT(S)/HR: 5000 INJECTION INTRAVENOUS; SUBCUTANEOUS at 21:21

## 2023-01-01 RX ADMIN — CHLORHEXIDINE GLUCONATE 1 APPLICATION(S): 213 SOLUTION TOPICAL at 18:18

## 2023-01-01 RX ADMIN — MORPHINE SULFATE 4 MILLIGRAM(S): 50 CAPSULE, EXTENDED RELEASE ORAL at 21:58

## 2023-01-01 RX ADMIN — Medication 1 APPLICATION(S): at 12:48

## 2023-01-01 RX ADMIN — OXYCODONE HYDROCHLORIDE 10 MILLIGRAM(S): 5 TABLET ORAL at 16:06

## 2023-01-01 RX ADMIN — Medication 5 MILLIGRAM(S): at 21:55

## 2023-01-01 RX ADMIN — OXYCODONE HYDROCHLORIDE 10 MILLIGRAM(S): 5 TABLET ORAL at 05:24

## 2023-01-01 RX ADMIN — HEPARIN SODIUM 5000 UNIT(S): 5000 INJECTION INTRAVENOUS; SUBCUTANEOUS at 13:29

## 2023-01-01 RX ADMIN — Medication 1 CAPSULE(S): at 16:44

## 2023-01-01 RX ADMIN — ERYTHROPOIETIN 10000 UNIT(S): 10000 INJECTION, SOLUTION INTRAVENOUS; SUBCUTANEOUS at 19:13

## 2023-01-01 RX ADMIN — Medication 5 MILLIGRAM(S): at 17:45

## 2023-01-01 RX ADMIN — Medication 100 MILLIGRAM(S): at 13:39

## 2023-01-01 RX ADMIN — SEVELAMER CARBONATE 800 MILLIGRAM(S): 2400 POWDER, FOR SUSPENSION ORAL at 06:44

## 2023-01-01 RX ADMIN — OXYCODONE HYDROCHLORIDE 10 MILLIGRAM(S): 5 TABLET ORAL at 06:20

## 2023-01-01 RX ADMIN — HEPARIN SODIUM 5000 UNIT(S): 5000 INJECTION INTRAVENOUS; SUBCUTANEOUS at 19:42

## 2023-01-01 RX ADMIN — LOSARTAN POTASSIUM 25 MILLIGRAM(S): 100 TABLET, FILM COATED ORAL at 10:25

## 2023-01-01 RX ADMIN — Medication 400 MILLIGRAM(S): at 06:57

## 2023-01-01 RX ADMIN — OXYCODONE HYDROCHLORIDE 5 MILLIGRAM(S): 5 TABLET ORAL at 13:53

## 2023-01-01 RX ADMIN — SODIUM CHLORIDE 500 MILLILITER(S): 9 INJECTION INTRAMUSCULAR; INTRAVENOUS; SUBCUTANEOUS at 06:57

## 2023-01-01 RX ADMIN — IRON SUCROSE 110 MILLIGRAM(S): 20 INJECTION, SOLUTION INTRAVENOUS at 11:47

## 2023-01-01 RX ADMIN — CALCITRIOL 0.5 MICROGRAM(S): 0.5 CAPSULE ORAL at 11:53

## 2023-01-01 RX ADMIN — OXYCODONE HYDROCHLORIDE 10 MILLIGRAM(S): 5 TABLET ORAL at 02:06

## 2023-01-01 RX ADMIN — Medication 1 APPLICATION(S): at 17:52

## 2023-01-01 RX ADMIN — Medication 1 MILLIGRAM(S): at 15:42

## 2023-01-01 RX ADMIN — CALCITRIOL 0.5 MICROGRAM(S): 0.5 CAPSULE ORAL at 12:30

## 2023-01-01 RX ADMIN — OXYCODONE HYDROCHLORIDE 7.5 MILLIGRAM(S): 5 TABLET ORAL at 04:33

## 2023-01-01 RX ADMIN — OXYCODONE HYDROCHLORIDE 10 MILLIGRAM(S): 5 TABLET ORAL at 11:55

## 2023-01-01 RX ADMIN — SODIUM CHLORIDE 50 MILLILITER(S): 9 INJECTION, SOLUTION INTRAVENOUS at 07:50

## 2023-01-01 RX ADMIN — HEPARIN SODIUM 5000 UNIT(S): 5000 INJECTION INTRAVENOUS; SUBCUTANEOUS at 23:16

## 2023-01-01 RX ADMIN — PANTOPRAZOLE SODIUM 40 MILLIGRAM(S): 20 TABLET, DELAYED RELEASE ORAL at 05:15

## 2023-01-01 RX ADMIN — DEXMEDETOMIDINE HYDROCHLORIDE IN 0.9% SODIUM CHLORIDE 8.22 MICROGRAM(S)/KG/HR: 4 INJECTION INTRAVENOUS at 03:55

## 2023-01-01 RX ADMIN — OXYCODONE HYDROCHLORIDE 10 MILLIGRAM(S): 5 TABLET ORAL at 17:54

## 2023-01-01 RX ADMIN — OXYCODONE HYDROCHLORIDE 10 MILLIGRAM(S): 5 TABLET ORAL at 09:53

## 2023-01-01 RX ADMIN — OXYCODONE HYDROCHLORIDE 10 MILLIGRAM(S): 5 TABLET ORAL at 22:48

## 2023-01-01 RX ADMIN — CALCITRIOL 0.5 MICROGRAM(S): 0.5 CAPSULE ORAL at 12:32

## 2023-01-01 RX ADMIN — Medication 600 MILLIGRAM(S): at 05:09

## 2023-01-01 RX ADMIN — ALTEPLASE 2 MILLIGRAM(S): KIT at 13:45

## 2023-01-01 RX ADMIN — OXYCODONE HYDROCHLORIDE 10 MILLIGRAM(S): 5 TABLET ORAL at 11:37

## 2023-01-01 RX ADMIN — Medication 100 GRAM(S): at 13:30

## 2023-01-01 RX ADMIN — MORPHINE SULFATE 2 MILLIGRAM(S): 50 CAPSULE, EXTENDED RELEASE ORAL at 22:24

## 2023-01-01 RX ADMIN — OXYCODONE HYDROCHLORIDE 5 MILLIGRAM(S): 5 TABLET ORAL at 19:32

## 2023-01-01 RX ADMIN — OXYCODONE HYDROCHLORIDE 10 MILLIGRAM(S): 5 TABLET ORAL at 22:46

## 2023-01-01 RX ADMIN — SENNA PLUS 2 TABLET(S): 8.6 TABLET ORAL at 22:38

## 2023-01-01 RX ADMIN — OXYCODONE HYDROCHLORIDE 10 MILLIGRAM(S): 5 TABLET ORAL at 09:50

## 2023-01-01 RX ADMIN — OXYCODONE HYDROCHLORIDE 10 MILLIGRAM(S): 5 TABLET ORAL at 04:42

## 2023-01-01 RX ADMIN — Medication 20 MILLIEQUIVALENT(S): at 07:24

## 2023-01-01 RX ADMIN — Medication 1 CAPSULE(S): at 11:47

## 2023-01-01 RX ADMIN — Medication 100 MILLIGRAM(S): at 11:19

## 2023-01-01 RX ADMIN — IRON SUCROSE 110 MILLIGRAM(S): 20 INJECTION, SOLUTION INTRAVENOUS at 15:36

## 2023-01-01 RX ADMIN — OXYCODONE HYDROCHLORIDE 5 MILLIGRAM(S): 5 TABLET ORAL at 18:03

## 2023-01-01 RX ADMIN — Medication 100 MILLIGRAM(S): at 12:37

## 2023-01-01 RX ADMIN — Medication 1 CAPSULE(S): at 08:02

## 2023-01-01 RX ADMIN — SENNA PLUS 2 TABLET(S): 8.6 TABLET ORAL at 22:52

## 2023-01-01 RX ADMIN — OXYCODONE HYDROCHLORIDE 7.5 MILLIGRAM(S): 5 TABLET ORAL at 22:27

## 2023-01-01 RX ADMIN — Medication 1 CAPSULE(S): at 16:38

## 2023-01-01 RX ADMIN — OXYCODONE HYDROCHLORIDE 10 MILLIGRAM(S): 5 TABLET ORAL at 08:37

## 2023-01-01 RX ADMIN — FENTANYL CITRATE 50 MICROGRAM(S): 50 INJECTION INTRAVENOUS at 16:18

## 2023-01-01 RX ADMIN — CHLORHEXIDINE GLUCONATE 1 APPLICATION(S): 213 SOLUTION TOPICAL at 06:59

## 2023-01-01 RX ADMIN — OXYCODONE HYDROCHLORIDE 10 MILLIGRAM(S): 5 TABLET ORAL at 19:00

## 2023-01-01 RX ADMIN — OXYCODONE HYDROCHLORIDE 10 MILLIGRAM(S): 5 TABLET ORAL at 21:00

## 2023-01-01 RX ADMIN — Medication 1 TABLET(S): at 13:00

## 2023-01-01 RX ADMIN — OXYCODONE HYDROCHLORIDE 10 MILLIGRAM(S): 5 TABLET ORAL at 11:04

## 2023-01-01 RX ADMIN — OXYCODONE HYDROCHLORIDE 10 MILLIGRAM(S): 5 TABLET ORAL at 04:32

## 2023-01-01 RX ADMIN — Medication 1 CAPSULE(S): at 17:07

## 2023-01-01 RX ADMIN — OXYCODONE HYDROCHLORIDE 10 MILLIGRAM(S): 5 TABLET ORAL at 14:56

## 2023-01-01 RX ADMIN — CHLORHEXIDINE GLUCONATE 1 APPLICATION(S): 213 SOLUTION TOPICAL at 05:58

## 2023-01-01 RX ADMIN — IRON SUCROSE 110 MILLIGRAM(S): 20 INJECTION, SOLUTION INTRAVENOUS at 13:23

## 2023-01-01 RX ADMIN — OXYCODONE HYDROCHLORIDE 10 MILLIGRAM(S): 5 TABLET ORAL at 18:22

## 2023-01-01 RX ADMIN — OXYCODONE HYDROCHLORIDE 10 MILLIGRAM(S): 5 TABLET ORAL at 15:35

## 2023-01-01 RX ADMIN — CALCITRIOL 0.5 MICROGRAM(S): 0.5 CAPSULE ORAL at 14:59

## 2023-01-01 RX ADMIN — OXYCODONE HYDROCHLORIDE 10 MILLIGRAM(S): 5 TABLET ORAL at 03:21

## 2023-01-01 RX ADMIN — Medication 1 CAPSULE(S): at 10:41

## 2023-01-01 RX ADMIN — OXYCODONE HYDROCHLORIDE 10 MILLIGRAM(S): 5 TABLET ORAL at 12:40

## 2023-01-01 RX ADMIN — PANTOPRAZOLE SODIUM 40 MILLIGRAM(S): 20 TABLET, DELAYED RELEASE ORAL at 17:58

## 2023-01-01 RX ADMIN — OXYCODONE HYDROCHLORIDE 10 MILLIGRAM(S): 5 TABLET ORAL at 13:56

## 2023-01-01 RX ADMIN — Medication 1625 MILLIGRAM(S): at 22:42

## 2023-01-01 RX ADMIN — HEPARIN SODIUM 5000 UNIT(S): 5000 INJECTION INTRAVENOUS; SUBCUTANEOUS at 22:43

## 2023-01-01 RX ADMIN — OXYCODONE HYDROCHLORIDE 10 MILLIGRAM(S): 5 TABLET ORAL at 11:22

## 2023-01-01 RX ADMIN — HEPARIN SODIUM 5000 UNIT(S): 5000 INJECTION INTRAVENOUS; SUBCUTANEOUS at 05:39

## 2023-01-01 RX ADMIN — Medication 25 MILLIGRAM(S): at 05:53

## 2023-01-01 RX ADMIN — PANTOPRAZOLE SODIUM 40 MILLIGRAM(S): 20 TABLET, DELAYED RELEASE ORAL at 17:17

## 2023-01-01 RX ADMIN — Medication 25 MILLIGRAM(S): at 14:29

## 2023-01-01 RX ADMIN — OXYCODONE HYDROCHLORIDE 7.5 MILLIGRAM(S): 5 TABLET ORAL at 23:22

## 2023-01-01 RX ADMIN — OXYCODONE HYDROCHLORIDE 7.5 MILLIGRAM(S): 5 TABLET ORAL at 10:05

## 2023-01-01 RX ADMIN — SEVELAMER CARBONATE 800 MILLIGRAM(S): 2400 POWDER, FOR SUSPENSION ORAL at 14:39

## 2023-01-01 RX ADMIN — FAMOTIDINE 20 MILLIGRAM(S): 10 INJECTION INTRAVENOUS at 06:57

## 2023-01-01 RX ADMIN — HEPARIN SODIUM 5000 UNIT(S): 5000 INJECTION INTRAVENOUS; SUBCUTANEOUS at 22:03

## 2023-01-01 RX ADMIN — APIXABAN 2.5 MILLIGRAM(S): 2.5 TABLET, FILM COATED ORAL at 17:35

## 2023-01-01 RX ADMIN — PIPERACILLIN AND TAZOBACTAM 25 GRAM(S): 4; .5 INJECTION, POWDER, LYOPHILIZED, FOR SOLUTION INTRAVENOUS at 21:13

## 2023-01-01 RX ADMIN — CHLORHEXIDINE GLUCONATE 1 APPLICATION(S): 213 SOLUTION TOPICAL at 05:45

## 2023-01-01 RX ADMIN — SEVELAMER CARBONATE 800 MILLIGRAM(S): 2400 POWDER, FOR SUSPENSION ORAL at 05:17

## 2023-01-01 RX ADMIN — HEPARIN SODIUM 2100 UNIT(S)/HR: 5000 INJECTION INTRAVENOUS; SUBCUTANEOUS at 13:24

## 2023-01-01 RX ADMIN — Medication 30 MILLIGRAM(S): at 05:40

## 2023-01-01 RX ADMIN — PANTOPRAZOLE SODIUM 40 MILLIGRAM(S): 20 TABLET, DELAYED RELEASE ORAL at 12:00

## 2023-01-01 RX ADMIN — HEPARIN SODIUM 5000 UNIT(S): 5000 INJECTION INTRAVENOUS; SUBCUTANEOUS at 15:01

## 2023-01-01 RX ADMIN — CHLORHEXIDINE GLUCONATE 15 MILLILITER(S): 213 SOLUTION TOPICAL at 18:30

## 2023-01-01 RX ADMIN — HEPARIN SODIUM 5000 UNIT(S): 5000 INJECTION INTRAVENOUS; SUBCUTANEOUS at 05:49

## 2023-01-01 RX ADMIN — OXYCODONE HYDROCHLORIDE 10 MILLIGRAM(S): 5 TABLET ORAL at 10:23

## 2023-01-01 RX ADMIN — Medication 600 MILLIGRAM(S): at 17:25

## 2023-01-01 RX ADMIN — PANTOPRAZOLE SODIUM 40 MILLIGRAM(S): 20 TABLET, DELAYED RELEASE ORAL at 12:23

## 2023-01-01 RX ADMIN — PANTOPRAZOLE SODIUM 40 MILLIGRAM(S): 20 TABLET, DELAYED RELEASE ORAL at 08:20

## 2023-01-01 RX ADMIN — CALCITRIOL 0.25 MICROGRAM(S): 0.5 CAPSULE ORAL at 14:41

## 2023-01-01 RX ADMIN — APIXABAN 5 MILLIGRAM(S): 2.5 TABLET, FILM COATED ORAL at 05:36

## 2023-01-01 RX ADMIN — MORPHINE SULFATE 4 MILLIGRAM(S): 50 CAPSULE, EXTENDED RELEASE ORAL at 10:16

## 2023-01-01 RX ADMIN — OXYCODONE HYDROCHLORIDE 2.5 MILLIGRAM(S): 5 TABLET ORAL at 09:33

## 2023-01-01 RX ADMIN — Medication 1 CAPSULE(S): at 18:06

## 2023-01-01 RX ADMIN — HEPARIN SODIUM 2300 UNIT(S)/HR: 5000 INJECTION INTRAVENOUS; SUBCUTANEOUS at 11:32

## 2023-01-01 RX ADMIN — OXYCODONE HYDROCHLORIDE 2.5 MILLIGRAM(S): 5 TABLET ORAL at 23:35

## 2023-01-01 RX ADMIN — PIPERACILLIN AND TAZOBACTAM 25 GRAM(S): 4; .5 INJECTION, POWDER, LYOPHILIZED, FOR SOLUTION INTRAVENOUS at 23:36

## 2023-01-01 RX ADMIN — APIXABAN 5 MILLIGRAM(S): 2.5 TABLET, FILM COATED ORAL at 05:52

## 2023-01-01 RX ADMIN — Medication 1 TABLET(S): at 17:45

## 2023-01-01 RX ADMIN — OXYCODONE HYDROCHLORIDE 10 MILLIGRAM(S): 5 TABLET ORAL at 22:37

## 2023-01-01 RX ADMIN — Medication 5 MILLIGRAM(S): at 15:30

## 2023-01-01 RX ADMIN — PANTOPRAZOLE SODIUM 40 MILLIGRAM(S): 20 TABLET, DELAYED RELEASE ORAL at 17:26

## 2023-01-01 RX ADMIN — Medication 1 CAPSULE(S): at 13:36

## 2023-01-01 RX ADMIN — Medication 5 MILLIGRAM(S): at 06:15

## 2023-01-01 RX ADMIN — Medication 1625 MILLIGRAM(S): at 06:44

## 2023-01-01 RX ADMIN — Medication 1 CAPSULE(S): at 17:26

## 2023-01-01 RX ADMIN — Medication 5 MILLIGRAM(S): at 12:56

## 2023-01-01 RX ADMIN — Medication 30 MILLIGRAM(S): at 07:36

## 2023-01-01 RX ADMIN — CHLORHEXIDINE GLUCONATE 1 APPLICATION(S): 213 SOLUTION TOPICAL at 05:11

## 2023-01-01 RX ADMIN — OXYCODONE HYDROCHLORIDE 10 MILLIGRAM(S): 5 TABLET ORAL at 01:54

## 2023-01-01 RX ADMIN — Medication 100 MILLIGRAM(S): at 12:54

## 2023-01-01 RX ADMIN — OXYCODONE HYDROCHLORIDE 10 MILLIGRAM(S): 5 TABLET ORAL at 20:13

## 2023-01-01 RX ADMIN — OXYCODONE HYDROCHLORIDE 10 MILLIGRAM(S): 5 TABLET ORAL at 11:11

## 2023-01-01 RX ADMIN — Medication 1 GRAM(S): at 07:35

## 2023-01-01 RX ADMIN — Medication 5 MILLIGRAM(S): at 13:07

## 2023-01-01 RX ADMIN — APIXABAN 5 MILLIGRAM(S): 2.5 TABLET, FILM COATED ORAL at 05:49

## 2023-01-01 RX ADMIN — Medication 1 CAPSULE(S): at 13:47

## 2023-01-01 RX ADMIN — PANTOPRAZOLE SODIUM 40 MILLIGRAM(S): 20 TABLET, DELAYED RELEASE ORAL at 07:40

## 2023-01-01 RX ADMIN — OXYCODONE HYDROCHLORIDE 10 MILLIGRAM(S): 5 TABLET ORAL at 16:51

## 2023-01-01 RX ADMIN — Medication 1 MILLIGRAM(S): at 12:44

## 2023-01-01 RX ADMIN — HEPARIN SODIUM 1900 UNIT(S)/HR: 5000 INJECTION INTRAVENOUS; SUBCUTANEOUS at 00:59

## 2023-01-01 RX ADMIN — OXYCODONE HYDROCHLORIDE 10 MILLIGRAM(S): 5 TABLET ORAL at 03:00

## 2023-01-01 RX ADMIN — MEROPENEM 100 MILLIGRAM(S): 1 INJECTION INTRAVENOUS at 11:58

## 2023-01-01 RX ADMIN — OXYCODONE HYDROCHLORIDE 10 MILLIGRAM(S): 5 TABLET ORAL at 10:27

## 2023-01-01 RX ADMIN — HEPARIN SODIUM 5000 UNIT(S): 5000 INJECTION INTRAVENOUS; SUBCUTANEOUS at 14:00

## 2023-01-01 RX ADMIN — OXYCODONE HYDROCHLORIDE 5 MILLIGRAM(S): 5 TABLET ORAL at 12:11

## 2023-01-01 RX ADMIN — LIDOCAINE 1 PATCH: 4 CREAM TOPICAL at 20:56

## 2023-01-01 RX ADMIN — Medication 100 MILLIGRAM(S): at 11:54

## 2023-01-01 RX ADMIN — HEPARIN SODIUM 4000 UNIT(S): 5000 INJECTION INTRAVENOUS; SUBCUTANEOUS at 01:57

## 2023-01-01 RX ADMIN — Medication 30 MILLIGRAM(S): at 05:15

## 2023-01-01 RX ADMIN — PANTOPRAZOLE SODIUM 40 MILLIGRAM(S): 20 TABLET, DELAYED RELEASE ORAL at 17:49

## 2023-01-01 RX ADMIN — OXYCODONE HYDROCHLORIDE 10 MILLIGRAM(S): 5 TABLET ORAL at 02:55

## 2023-01-01 RX ADMIN — OXYCODONE HYDROCHLORIDE 10 MILLIGRAM(S): 5 TABLET ORAL at 22:32

## 2023-01-01 RX ADMIN — Medication 1 GRAM(S): at 05:41

## 2023-01-01 RX ADMIN — OXYCODONE HYDROCHLORIDE 10 MILLIGRAM(S): 5 TABLET ORAL at 07:53

## 2023-01-01 RX ADMIN — HEPARIN SODIUM 5000 UNIT(S): 5000 INJECTION INTRAVENOUS; SUBCUTANEOUS at 17:08

## 2023-01-01 RX ADMIN — OXYCODONE HYDROCHLORIDE 10 MILLIGRAM(S): 5 TABLET ORAL at 22:02

## 2023-01-01 RX ADMIN — OXYCODONE HYDROCHLORIDE 10 MILLIGRAM(S): 5 TABLET ORAL at 00:56

## 2023-01-01 RX ADMIN — OXYCODONE HYDROCHLORIDE 7.5 MILLIGRAM(S): 5 TABLET ORAL at 19:17

## 2023-01-01 RX ADMIN — ONDANSETRON 4 MILLIGRAM(S): 8 TABLET, FILM COATED ORAL at 01:30

## 2023-01-01 RX ADMIN — OXYCODONE HYDROCHLORIDE 10 MILLIGRAM(S): 5 TABLET ORAL at 18:23

## 2023-01-01 RX ADMIN — PANTOPRAZOLE SODIUM 40 MILLIGRAM(S): 20 TABLET, DELAYED RELEASE ORAL at 06:09

## 2023-01-01 RX ADMIN — CHLORHEXIDINE GLUCONATE 1 APPLICATION(S): 213 SOLUTION TOPICAL at 18:13

## 2023-01-01 RX ADMIN — OXYCODONE HYDROCHLORIDE 10 MILLIGRAM(S): 5 TABLET ORAL at 15:16

## 2023-01-01 RX ADMIN — Medication 1 CAPSULE(S): at 08:56

## 2023-01-01 RX ADMIN — OXYCODONE HYDROCHLORIDE 7.5 MILLIGRAM(S): 5 TABLET ORAL at 00:00

## 2023-01-01 RX ADMIN — OXYCODONE HYDROCHLORIDE 10 MILLIGRAM(S): 5 TABLET ORAL at 07:28

## 2023-01-01 RX ADMIN — CHLORHEXIDINE GLUCONATE 1 APPLICATION(S): 213 SOLUTION TOPICAL at 06:05

## 2023-01-01 RX ADMIN — OXYCODONE HYDROCHLORIDE 10 MILLIGRAM(S): 5 TABLET ORAL at 11:21

## 2023-01-01 RX ADMIN — OXYCODONE HYDROCHLORIDE 7.5 MILLIGRAM(S): 5 TABLET ORAL at 18:28

## 2023-01-01 RX ADMIN — OXYCODONE HYDROCHLORIDE 10 MILLIGRAM(S): 5 TABLET ORAL at 11:41

## 2023-01-01 RX ADMIN — OXYCODONE HYDROCHLORIDE 10 MILLIGRAM(S): 5 TABLET ORAL at 08:23

## 2023-01-01 RX ADMIN — SENNA PLUS 2 TABLET(S): 8.6 TABLET ORAL at 21:13

## 2023-01-01 RX ADMIN — CALCITRIOL 0.25 MICROGRAM(S): 0.5 CAPSULE ORAL at 12:28

## 2023-01-01 RX ADMIN — Medication 5 MILLIGRAM(S): at 21:33

## 2023-01-01 RX ADMIN — HEPARIN SODIUM 5000 UNIT(S): 5000 INJECTION INTRAVENOUS; SUBCUTANEOUS at 06:18

## 2023-01-01 RX ADMIN — LIDOCAINE 1 PATCH: 4 CREAM TOPICAL at 12:40

## 2023-01-01 RX ADMIN — CEFTRIAXONE 100 MILLIGRAM(S): 500 INJECTION, POWDER, FOR SOLUTION INTRAMUSCULAR; INTRAVENOUS at 09:33

## 2023-01-01 RX ADMIN — OXYCODONE HYDROCHLORIDE 10 MILLIGRAM(S): 5 TABLET ORAL at 02:05

## 2023-01-01 RX ADMIN — OXYCODONE HYDROCHLORIDE 10 MILLIGRAM(S): 5 TABLET ORAL at 05:58

## 2023-01-01 RX ADMIN — Medication 325 MILLIGRAM(S): at 16:33

## 2023-01-01 RX ADMIN — PANTOPRAZOLE SODIUM 40 MILLIGRAM(S): 20 TABLET, DELAYED RELEASE ORAL at 05:58

## 2023-01-01 RX ADMIN — OXYCODONE HYDROCHLORIDE 10 MILLIGRAM(S): 5 TABLET ORAL at 20:05

## 2023-01-01 RX ADMIN — HEPARIN SODIUM 5000 UNIT(S): 5000 INJECTION INTRAVENOUS; SUBCUTANEOUS at 15:20

## 2023-01-01 RX ADMIN — Medication 1 TABLET(S): at 11:33

## 2023-01-01 RX ADMIN — Medication 1 CAPSULE(S): at 12:32

## 2023-01-01 RX ADMIN — Medication 40 MILLIEQUIVALENT(S): at 11:29

## 2023-01-01 RX ADMIN — OXYCODONE HYDROCHLORIDE 10 MILLIGRAM(S): 5 TABLET ORAL at 16:10

## 2023-01-01 RX ADMIN — APIXABAN 5 MILLIGRAM(S): 2.5 TABLET, FILM COATED ORAL at 06:15

## 2023-01-01 RX ADMIN — OXYCODONE HYDROCHLORIDE 10 MILLIGRAM(S): 5 TABLET ORAL at 11:59

## 2023-01-01 RX ADMIN — OXYCODONE HYDROCHLORIDE 7.5 MILLIGRAM(S): 5 TABLET ORAL at 17:36

## 2023-01-01 RX ADMIN — OXYCODONE HYDROCHLORIDE 10 MILLIGRAM(S): 5 TABLET ORAL at 02:58

## 2023-01-01 RX ADMIN — OXYCODONE HYDROCHLORIDE 7.5 MILLIGRAM(S): 5 TABLET ORAL at 09:26

## 2023-01-01 RX ADMIN — CALCITRIOL 0.5 MICROGRAM(S): 0.5 CAPSULE ORAL at 12:46

## 2023-01-01 RX ADMIN — OXYCODONE HYDROCHLORIDE 10 MILLIGRAM(S): 5 TABLET ORAL at 17:23

## 2023-01-01 RX ADMIN — Medication 5 MILLIGRAM(S): at 22:33

## 2023-01-01 RX ADMIN — FENTANYL CITRATE 100 MICROGRAM(S): 50 INJECTION INTRAVENOUS at 16:18

## 2023-01-01 RX ADMIN — HEPARIN SODIUM 5000 UNIT(S): 5000 INJECTION INTRAVENOUS; SUBCUTANEOUS at 21:18

## 2023-01-01 RX ADMIN — OXYCODONE HYDROCHLORIDE 7.5 MILLIGRAM(S): 5 TABLET ORAL at 23:39

## 2023-01-01 RX ADMIN — OXYCODONE HYDROCHLORIDE 7.5 MILLIGRAM(S): 5 TABLET ORAL at 15:50

## 2023-01-01 RX ADMIN — HEPARIN SODIUM 5000 UNIT(S): 5000 INJECTION INTRAVENOUS; SUBCUTANEOUS at 06:45

## 2023-01-01 RX ADMIN — Medication 1300 MILLIGRAM(S): at 23:08

## 2023-01-01 RX ADMIN — Medication 105 MILLIGRAM(S): at 13:51

## 2023-01-01 RX ADMIN — OXYCODONE HYDROCHLORIDE 10 MILLIGRAM(S): 5 TABLET ORAL at 20:36

## 2023-01-01 RX ADMIN — CALCITRIOL 0.25 MICROGRAM(S): 0.5 CAPSULE ORAL at 12:44

## 2023-01-01 RX ADMIN — PANTOPRAZOLE SODIUM 40 MILLIGRAM(S): 20 TABLET, DELAYED RELEASE ORAL at 06:14

## 2023-01-01 RX ADMIN — OXYCODONE HYDROCHLORIDE 10 MILLIGRAM(S): 5 TABLET ORAL at 01:16

## 2023-01-01 RX ADMIN — OXYCODONE HYDROCHLORIDE 10 MILLIGRAM(S): 5 TABLET ORAL at 02:09

## 2023-01-01 RX ADMIN — PANTOPRAZOLE SODIUM 40 MILLIGRAM(S): 20 TABLET, DELAYED RELEASE ORAL at 17:54

## 2023-01-01 RX ADMIN — OXYCODONE HYDROCHLORIDE 10 MILLIGRAM(S): 5 TABLET ORAL at 21:38

## 2023-01-01 RX ADMIN — HEPARIN SODIUM 5000 UNIT(S): 5000 INJECTION INTRAVENOUS; SUBCUTANEOUS at 14:34

## 2023-01-01 RX ADMIN — Medication 1300 MILLIGRAM(S): at 23:22

## 2023-01-01 RX ADMIN — HEPARIN SODIUM 5000 UNIT(S): 5000 INJECTION INTRAVENOUS; SUBCUTANEOUS at 14:11

## 2023-01-01 RX ADMIN — Medication 1 APPLICATION(S): at 13:20

## 2023-01-01 RX ADMIN — Medication 1 APPLICATION(S): at 11:55

## 2023-01-01 RX ADMIN — OXYCODONE HYDROCHLORIDE 7.5 MILLIGRAM(S): 5 TABLET ORAL at 00:01

## 2023-01-01 RX ADMIN — OXYCODONE HYDROCHLORIDE 10 MILLIGRAM(S): 5 TABLET ORAL at 23:22

## 2023-01-01 RX ADMIN — OXYCODONE HYDROCHLORIDE 7.5 MILLIGRAM(S): 5 TABLET ORAL at 23:30

## 2023-01-01 RX ADMIN — APIXABAN 5 MILLIGRAM(S): 2.5 TABLET, FILM COATED ORAL at 05:24

## 2023-01-01 RX ADMIN — Medication 30 MILLIGRAM(S): at 06:19

## 2023-01-01 RX ADMIN — Medication 1 MILLIGRAM(S): at 12:54

## 2023-01-01 RX ADMIN — OXYCODONE HYDROCHLORIDE 10 MILLIGRAM(S): 5 TABLET ORAL at 20:00

## 2023-01-01 RX ADMIN — CHLORHEXIDINE GLUCONATE 1 APPLICATION(S): 213 SOLUTION TOPICAL at 18:48

## 2023-01-01 RX ADMIN — HYDROMORPHONE HYDROCHLORIDE 1 MILLIGRAM(S): 2 INJECTION INTRAMUSCULAR; INTRAVENOUS; SUBCUTANEOUS at 15:29

## 2023-01-01 RX ADMIN — CALCITRIOL 0.25 MICROGRAM(S): 0.5 CAPSULE ORAL at 11:54

## 2023-01-01 RX ADMIN — LIDOCAINE 1 PATCH: 4 CREAM TOPICAL at 12:32

## 2023-01-01 RX ADMIN — SEVELAMER CARBONATE 800 MILLIGRAM(S): 2400 POWDER, FOR SUSPENSION ORAL at 22:51

## 2023-01-01 RX ADMIN — Medication 1 APPLICATION(S): at 18:26

## 2023-01-01 RX ADMIN — PANTOPRAZOLE SODIUM 40 MILLIGRAM(S): 20 TABLET, DELAYED RELEASE ORAL at 20:18

## 2023-01-01 RX ADMIN — HEPARIN SODIUM 5000 UNIT(S): 5000 INJECTION INTRAVENOUS; SUBCUTANEOUS at 05:14

## 2023-01-01 RX ADMIN — OXYCODONE HYDROCHLORIDE 10 MILLIGRAM(S): 5 TABLET ORAL at 18:02

## 2023-01-01 RX ADMIN — OXYCODONE HYDROCHLORIDE 5 MILLIGRAM(S): 5 TABLET ORAL at 19:03

## 2023-01-01 RX ADMIN — OXYCODONE HYDROCHLORIDE 5 MILLIGRAM(S): 5 TABLET ORAL at 22:04

## 2023-01-01 RX ADMIN — Medication 1 GRAM(S): at 00:01

## 2023-01-01 RX ADMIN — Medication 5 MILLIGRAM(S): at 21:48

## 2023-01-01 RX ADMIN — Medication 1 APPLICATION(S): at 11:45

## 2023-01-01 RX ADMIN — Medication 325 MILLIGRAM(S): at 16:06

## 2023-01-01 RX ADMIN — OXYCODONE HYDROCHLORIDE 7.5 MILLIGRAM(S): 5 TABLET ORAL at 13:38

## 2023-01-01 RX ADMIN — ETOMIDATE 20 MILLIGRAM(S): 2 INJECTION INTRAVENOUS at 20:08

## 2023-01-01 RX ADMIN — Medication 1 GRAM(S): at 17:34

## 2023-01-01 RX ADMIN — Medication 650 MILLIGRAM(S): at 22:52

## 2023-01-01 RX ADMIN — OXYCODONE HYDROCHLORIDE 7.5 MILLIGRAM(S): 5 TABLET ORAL at 07:01

## 2023-01-01 RX ADMIN — Medication 1 CAPSULE(S): at 17:34

## 2023-01-01 RX ADMIN — OXYCODONE HYDROCHLORIDE 10 MILLIGRAM(S): 5 TABLET ORAL at 13:33

## 2023-01-01 RX ADMIN — Medication 1300 MILLIGRAM(S): at 07:37

## 2023-01-01 RX ADMIN — APIXABAN 5 MILLIGRAM(S): 2.5 TABLET, FILM COATED ORAL at 05:57

## 2023-01-01 RX ADMIN — Medication 5 MILLIGRAM(S): at 05:10

## 2023-01-01 RX ADMIN — OXYCODONE HYDROCHLORIDE 7.5 MILLIGRAM(S): 5 TABLET ORAL at 07:30

## 2023-01-01 RX ADMIN — APIXABAN 5 MILLIGRAM(S): 2.5 TABLET, FILM COATED ORAL at 22:03

## 2023-01-01 RX ADMIN — Medication 1 GRAM(S): at 15:35

## 2023-01-01 RX ADMIN — OXYCODONE HYDROCHLORIDE 10 MILLIGRAM(S): 5 TABLET ORAL at 13:00

## 2023-01-01 RX ADMIN — ERYTHROPOIETIN 10000 UNIT(S): 10000 INJECTION, SOLUTION INTRAVENOUS; SUBCUTANEOUS at 11:19

## 2023-01-01 RX ADMIN — OXYCODONE HYDROCHLORIDE 10 MILLIGRAM(S): 5 TABLET ORAL at 06:30

## 2023-01-01 RX ADMIN — OXYCODONE HYDROCHLORIDE 7.5 MILLIGRAM(S): 5 TABLET ORAL at 06:28

## 2023-01-01 RX ADMIN — Medication 1 CAPSULE(S): at 13:32

## 2023-01-01 RX ADMIN — SEVELAMER CARBONATE 800 MILLIGRAM(S): 2400 POWDER, FOR SUSPENSION ORAL at 21:44

## 2023-01-01 RX ADMIN — OXYCODONE HYDROCHLORIDE 10 MILLIGRAM(S): 5 TABLET ORAL at 07:26

## 2023-01-01 RX ADMIN — Medication 1 GRAM(S): at 06:19

## 2023-01-01 RX ADMIN — HEPARIN SODIUM 17 UNIT(S)/HR: 5000 INJECTION INTRAVENOUS; SUBCUTANEOUS at 09:41

## 2023-01-01 RX ADMIN — Medication 5 MILLIGRAM(S): at 11:54

## 2023-01-01 RX ADMIN — HEPARIN SODIUM 16 UNIT(S)/HR: 5000 INJECTION INTRAVENOUS; SUBCUTANEOUS at 10:18

## 2023-01-01 RX ADMIN — Medication 5 MILLIGRAM(S): at 06:13

## 2023-01-01 RX ADMIN — Medication 1 CAPSULE(S): at 08:18

## 2023-01-01 RX ADMIN — HEPARIN SODIUM 12 UNIT(S)/HR: 5000 INJECTION INTRAVENOUS; SUBCUTANEOUS at 23:16

## 2023-01-01 RX ADMIN — Medication 25 MILLIGRAM(S): at 10:48

## 2023-01-01 RX ADMIN — Medication 40 MILLIEQUIVALENT(S): at 14:38

## 2023-01-01 RX ADMIN — OXYCODONE HYDROCHLORIDE 7.5 MILLIGRAM(S): 5 TABLET ORAL at 19:09

## 2023-01-01 RX ADMIN — OXYCODONE HYDROCHLORIDE 10 MILLIGRAM(S): 5 TABLET ORAL at 16:18

## 2023-01-01 RX ADMIN — OXYCODONE HYDROCHLORIDE 10 MILLIGRAM(S): 5 TABLET ORAL at 02:16

## 2023-01-01 RX ADMIN — Medication 1 TABLET(S): at 12:46

## 2023-01-01 RX ADMIN — OXYCODONE HYDROCHLORIDE 10 MILLIGRAM(S): 5 TABLET ORAL at 11:31

## 2023-01-01 RX ADMIN — Medication 10 MILLIEQUIVALENT(S): at 10:06

## 2023-01-01 RX ADMIN — MORPHINE SULFATE 2 MILLIGRAM(S): 50 CAPSULE, EXTENDED RELEASE ORAL at 02:53

## 2023-01-01 RX ADMIN — OXYCODONE HYDROCHLORIDE 7.5 MILLIGRAM(S): 5 TABLET ORAL at 15:41

## 2023-01-01 RX ADMIN — OXYCODONE HYDROCHLORIDE 10 MILLIGRAM(S): 5 TABLET ORAL at 09:56

## 2023-01-01 RX ADMIN — OXYCODONE HYDROCHLORIDE 5 MILLIGRAM(S): 5 TABLET ORAL at 09:45

## 2023-01-01 RX ADMIN — Medication 100 MILLIGRAM(S): at 17:32

## 2023-01-01 RX ADMIN — OXYCODONE HYDROCHLORIDE 7.5 MILLIGRAM(S): 5 TABLET ORAL at 22:35

## 2023-01-01 RX ADMIN — OXYCODONE HYDROCHLORIDE 10 MILLIGRAM(S): 5 TABLET ORAL at 11:10

## 2023-01-01 RX ADMIN — Medication 1 CAPSULE(S): at 18:00

## 2023-01-01 RX ADMIN — OXYCODONE HYDROCHLORIDE 7.5 MILLIGRAM(S): 5 TABLET ORAL at 10:11

## 2023-01-01 RX ADMIN — Medication 1 CAPSULE(S): at 17:30

## 2023-01-01 RX ADMIN — CALCITRIOL 0.5 MICROGRAM(S): 0.5 CAPSULE ORAL at 13:22

## 2023-01-01 RX ADMIN — OXYCODONE HYDROCHLORIDE 7.5 MILLIGRAM(S): 5 TABLET ORAL at 03:37

## 2023-01-01 RX ADMIN — MUPIROCIN 1 APPLICATION(S): 20 OINTMENT TOPICAL at 22:50

## 2023-01-01 RX ADMIN — CHLORHEXIDINE GLUCONATE 1 APPLICATION(S): 213 SOLUTION TOPICAL at 05:34

## 2023-01-01 RX ADMIN — HEPARIN SODIUM 5000 UNIT(S): 5000 INJECTION INTRAVENOUS; SUBCUTANEOUS at 23:40

## 2023-01-01 RX ADMIN — OXYCODONE HYDROCHLORIDE 7.5 MILLIGRAM(S): 5 TABLET ORAL at 18:44

## 2023-01-01 RX ADMIN — Medication 5 MILLIGRAM(S): at 05:46

## 2023-01-01 RX ADMIN — ERYTHROPOIETIN 10000 UNIT(S): 10000 INJECTION, SOLUTION INTRAVENOUS; SUBCUTANEOUS at 18:54

## 2023-01-01 RX ADMIN — Medication 40 MILLIEQUIVALENT(S): at 17:49

## 2023-01-01 RX ADMIN — Medication 200 INTERNATIONAL UNIT(S): at 11:57

## 2023-01-01 RX ADMIN — OXYCODONE HYDROCHLORIDE 10 MILLIGRAM(S): 5 TABLET ORAL at 05:09

## 2023-01-01 RX ADMIN — OXYCODONE HYDROCHLORIDE 7.5 MILLIGRAM(S): 5 TABLET ORAL at 02:47

## 2023-01-01 RX ADMIN — PIPERACILLIN AND TAZOBACTAM 25 GRAM(S): 4; .5 INJECTION, POWDER, LYOPHILIZED, FOR SOLUTION INTRAVENOUS at 12:59

## 2023-01-01 RX ADMIN — PANTOPRAZOLE SODIUM 40 MILLIGRAM(S): 20 TABLET, DELAYED RELEASE ORAL at 18:00

## 2023-01-01 RX ADMIN — OXYCODONE HYDROCHLORIDE 10 MILLIGRAM(S): 5 TABLET ORAL at 01:40

## 2023-01-01 RX ADMIN — Medication 1 CAPSULE(S): at 18:04

## 2023-01-01 RX ADMIN — Medication 5 MILLIGRAM(S): at 22:12

## 2023-01-01 RX ADMIN — Medication 1 CAPSULE(S): at 17:45

## 2023-01-01 RX ADMIN — Medication 100 MILLIEQUIVALENT(S): at 15:03

## 2023-01-01 RX ADMIN — CHLORHEXIDINE GLUCONATE 1 APPLICATION(S): 213 SOLUTION TOPICAL at 07:27

## 2023-01-01 RX ADMIN — PIPERACILLIN AND TAZOBACTAM 25 GRAM(S): 4; .5 INJECTION, POWDER, LYOPHILIZED, FOR SOLUTION INTRAVENOUS at 13:38

## 2023-01-01 RX ADMIN — HEPARIN SODIUM 5000 UNIT(S): 5000 INJECTION INTRAVENOUS; SUBCUTANEOUS at 15:06

## 2023-01-01 RX ADMIN — PANTOPRAZOLE SODIUM 40 MILLIGRAM(S): 20 TABLET, DELAYED RELEASE ORAL at 06:00

## 2023-01-01 RX ADMIN — OXYCODONE HYDROCHLORIDE 10 MILLIGRAM(S): 5 TABLET ORAL at 22:30

## 2023-01-01 RX ADMIN — Medication 100 MILLILITER(S): at 19:45

## 2023-01-01 RX ADMIN — OXYCODONE HYDROCHLORIDE 10 MILLIGRAM(S): 5 TABLET ORAL at 01:35

## 2023-01-01 RX ADMIN — HEPARIN SODIUM 5000 UNIT(S): 5000 INJECTION INTRAVENOUS; SUBCUTANEOUS at 15:43

## 2023-01-01 RX ADMIN — MORPHINE SULFATE 4 MILLIGRAM(S): 50 CAPSULE, EXTENDED RELEASE ORAL at 22:13

## 2023-01-01 RX ADMIN — Medication 5 MILLIGRAM(S): at 13:46

## 2023-01-01 RX ADMIN — OXYCODONE HYDROCHLORIDE 5 MILLIGRAM(S): 5 TABLET ORAL at 03:16

## 2023-01-01 RX ADMIN — OXYCODONE HYDROCHLORIDE 7.5 MILLIGRAM(S): 5 TABLET ORAL at 06:21

## 2023-01-01 RX ADMIN — Medication 20 MILLIEQUIVALENT(S): at 12:49

## 2023-01-01 RX ADMIN — Medication 1625 MILLIGRAM(S): at 16:50

## 2023-01-01 RX ADMIN — TIZANIDINE 2 MILLIGRAM(S): 4 TABLET ORAL at 05:58

## 2023-01-01 RX ADMIN — PANTOPRAZOLE SODIUM 40 MILLIGRAM(S): 20 TABLET, DELAYED RELEASE ORAL at 17:51

## 2023-01-01 RX ADMIN — Medication 30 MILLIGRAM(S): at 06:23

## 2023-01-01 RX ADMIN — Medication 1 APPLICATION(S): at 11:57

## 2023-01-01 RX ADMIN — Medication 25 GRAM(S): at 11:35

## 2023-01-01 RX ADMIN — OXYCODONE HYDROCHLORIDE 10 MILLIGRAM(S): 5 TABLET ORAL at 09:09

## 2023-01-01 RX ADMIN — HEPARIN SODIUM 5000 UNIT(S): 5000 INJECTION INTRAVENOUS; SUBCUTANEOUS at 17:31

## 2023-01-01 RX ADMIN — OXYCODONE HYDROCHLORIDE 7.5 MILLIGRAM(S): 5 TABLET ORAL at 10:47

## 2023-01-01 RX ADMIN — Medication 5 MILLIGRAM(S): at 06:46

## 2023-01-01 RX ADMIN — Medication 5 MILLIGRAM(S): at 06:50

## 2023-01-01 RX ADMIN — HEPARIN SODIUM 2200 UNIT(S)/HR: 5000 INJECTION INTRAVENOUS; SUBCUTANEOUS at 18:00

## 2023-01-01 RX ADMIN — OXYCODONE HYDROCHLORIDE 10 MILLIGRAM(S): 5 TABLET ORAL at 06:38

## 2023-01-01 RX ADMIN — CHLORHEXIDINE GLUCONATE 1 APPLICATION(S): 213 SOLUTION TOPICAL at 11:36

## 2023-01-01 RX ADMIN — Medication 1 CAPSULE(S): at 12:46

## 2023-01-01 RX ADMIN — HEPARIN SODIUM 5000 UNIT(S): 5000 INJECTION INTRAVENOUS; SUBCUTANEOUS at 11:53

## 2023-01-01 RX ADMIN — Medication 5 MILLIGRAM(S): at 22:28

## 2023-01-01 RX ADMIN — OXYCODONE HYDROCHLORIDE 10 MILLIGRAM(S): 5 TABLET ORAL at 05:13

## 2023-01-01 RX ADMIN — Medication 25 GRAM(S): at 19:15

## 2023-01-01 RX ADMIN — Medication 1300 MILLIGRAM(S): at 05:47

## 2023-01-01 RX ADMIN — Medication 5 MILLIGRAM(S): at 13:23

## 2023-01-01 RX ADMIN — OXYCODONE HYDROCHLORIDE 10 MILLIGRAM(S): 5 TABLET ORAL at 19:15

## 2023-01-01 RX ADMIN — Medication 5 MILLIGRAM(S): at 06:31

## 2023-01-01 RX ADMIN — Medication 1625 MILLIGRAM(S): at 06:39

## 2023-01-01 RX ADMIN — Medication 1 TABLET(S): at 12:49

## 2023-01-01 RX ADMIN — Medication 100 MILLIGRAM(S): at 17:46

## 2023-01-01 RX ADMIN — OXYCODONE HYDROCHLORIDE 10 MILLIGRAM(S): 5 TABLET ORAL at 19:21

## 2023-01-01 RX ADMIN — Medication 1 TABLET(S): at 18:46

## 2023-01-01 RX ADMIN — Medication 5 MILLIGRAM(S): at 23:03

## 2023-01-01 RX ADMIN — Medication 100 MILLIEQUIVALENT(S): at 16:59

## 2023-01-01 RX ADMIN — OXYCODONE HYDROCHLORIDE 2.5 MILLIGRAM(S): 5 TABLET ORAL at 11:54

## 2023-01-01 RX ADMIN — Medication 1 TABLET(S): at 12:45

## 2023-01-01 RX ADMIN — Medication 200 INTERNATIONAL UNIT(S): at 19:41

## 2023-01-01 RX ADMIN — OXYCODONE HYDROCHLORIDE 7.5 MILLIGRAM(S): 5 TABLET ORAL at 14:49

## 2023-01-01 RX ADMIN — OXYCODONE HYDROCHLORIDE 10 MILLIGRAM(S): 5 TABLET ORAL at 23:11

## 2023-01-01 RX ADMIN — PANTOPRAZOLE SODIUM 40 MILLIGRAM(S): 20 TABLET, DELAYED RELEASE ORAL at 06:59

## 2023-01-01 RX ADMIN — Medication 2 MILLIGRAM(S): at 19:03

## 2023-01-01 RX ADMIN — Medication 1 CAPSULE(S): at 07:44

## 2023-01-01 RX ADMIN — Medication 1 PACKET(S): at 15:37

## 2023-01-01 RX ADMIN — Medication 1 TABLET(S): at 11:56

## 2023-01-01 RX ADMIN — LIDOCAINE 1 PATCH: 4 CREAM TOPICAL at 18:22

## 2023-01-01 RX ADMIN — HEPARIN SODIUM 1100 UNIT(S)/HR: 5000 INJECTION INTRAVENOUS; SUBCUTANEOUS at 16:04

## 2023-01-01 RX ADMIN — Medication 5 MILLIGRAM(S): at 05:12

## 2023-01-01 RX ADMIN — HEPARIN SODIUM 5000 UNIT(S): 5000 INJECTION INTRAVENOUS; SUBCUTANEOUS at 13:06

## 2023-01-01 RX ADMIN — PIPERACILLIN AND TAZOBACTAM 25 GRAM(S): 4; .5 INJECTION, POWDER, LYOPHILIZED, FOR SOLUTION INTRAVENOUS at 12:21

## 2023-01-01 RX ADMIN — Medication 5 MILLIGRAM(S): at 21:56

## 2023-01-01 RX ADMIN — Medication 100 MILLIGRAM(S): at 12:57

## 2023-01-01 RX ADMIN — CEFTRIAXONE 100 MILLIGRAM(S): 500 INJECTION, POWDER, FOR SOLUTION INTRAMUSCULAR; INTRAVENOUS at 16:15

## 2023-01-01 RX ADMIN — OXYCODONE HYDROCHLORIDE 10 MILLIGRAM(S): 5 TABLET ORAL at 10:55

## 2023-01-01 RX ADMIN — OXYCODONE HYDROCHLORIDE 10 MILLIGRAM(S): 5 TABLET ORAL at 00:50

## 2023-01-01 RX ADMIN — CALCITRIOL 0.5 MICROGRAM(S): 0.5 CAPSULE ORAL at 18:47

## 2023-01-01 RX ADMIN — HEPARIN SODIUM 2100 UNIT(S)/HR: 5000 INJECTION INTRAVENOUS; SUBCUTANEOUS at 08:49

## 2023-01-01 RX ADMIN — HEPARIN SODIUM 5000 UNIT(S): 5000 INJECTION INTRAVENOUS; SUBCUTANEOUS at 21:44

## 2023-01-01 RX ADMIN — APIXABAN 5 MILLIGRAM(S): 2.5 TABLET, FILM COATED ORAL at 13:52

## 2023-01-01 RX ADMIN — Medication 81 MILLIGRAM(S): at 11:57

## 2023-01-01 RX ADMIN — Medication 1 TABLET(S): at 11:53

## 2023-01-01 RX ADMIN — OLANZAPINE 2.5 MILLIGRAM(S): 15 TABLET, FILM COATED ORAL at 23:51

## 2023-01-01 RX ADMIN — OXYCODONE HYDROCHLORIDE 10 MILLIGRAM(S): 5 TABLET ORAL at 12:34

## 2023-01-01 RX ADMIN — OXYCODONE HYDROCHLORIDE 10 MILLIGRAM(S): 5 TABLET ORAL at 06:16

## 2023-01-01 RX ADMIN — Medication 30 MILLIGRAM(S): at 05:10

## 2023-01-01 RX ADMIN — LIDOCAINE 1 PATCH: 4 CREAM TOPICAL at 11:48

## 2023-01-01 RX ADMIN — Medication 30 MILLIGRAM(S): at 07:01

## 2023-01-01 RX ADMIN — HEPARIN SODIUM 5000 UNIT(S): 5000 INJECTION INTRAVENOUS; SUBCUTANEOUS at 22:27

## 2023-01-01 RX ADMIN — HEPARIN SODIUM 5000 UNIT(S): 5000 INJECTION INTRAVENOUS; SUBCUTANEOUS at 05:32

## 2023-01-01 RX ADMIN — OXYCODONE HYDROCHLORIDE 10 MILLIGRAM(S): 5 TABLET ORAL at 08:05

## 2023-01-01 RX ADMIN — PANTOPRAZOLE SODIUM 40 MILLIGRAM(S): 20 TABLET, DELAYED RELEASE ORAL at 06:01

## 2023-01-01 RX ADMIN — PIPERACILLIN AND TAZOBACTAM 25 GRAM(S): 4; .5 INJECTION, POWDER, LYOPHILIZED, FOR SOLUTION INTRAVENOUS at 12:43

## 2023-01-01 RX ADMIN — CHLORHEXIDINE GLUCONATE 1 APPLICATION(S): 213 SOLUTION TOPICAL at 06:11

## 2023-01-01 RX ADMIN — Medication 100 MILLIGRAM(S): at 11:46

## 2023-01-01 RX ADMIN — OXYCODONE HYDROCHLORIDE 5 MILLIGRAM(S): 5 TABLET ORAL at 04:17

## 2023-01-01 RX ADMIN — HEPARIN SODIUM 5000 UNIT(S): 5000 INJECTION INTRAVENOUS; SUBCUTANEOUS at 05:45

## 2023-01-01 RX ADMIN — Medication 100 MILLIGRAM(S): at 14:58

## 2023-01-01 RX ADMIN — APIXABAN 2.5 MILLIGRAM(S): 2.5 TABLET, FILM COATED ORAL at 05:32

## 2023-01-01 RX ADMIN — CALCITRIOL 0.5 MICROGRAM(S): 0.5 CAPSULE ORAL at 11:33

## 2023-01-01 RX ADMIN — OXYCODONE HYDROCHLORIDE 10 MILLIGRAM(S): 5 TABLET ORAL at 19:24

## 2023-01-01 RX ADMIN — OXYCODONE HYDROCHLORIDE 10 MILLIGRAM(S): 5 TABLET ORAL at 18:05

## 2023-01-01 RX ADMIN — OXYCODONE HYDROCHLORIDE 7.5 MILLIGRAM(S): 5 TABLET ORAL at 22:28

## 2023-01-01 RX ADMIN — Medication 5 MILLIGRAM(S): at 21:47

## 2023-01-01 RX ADMIN — PIPERACILLIN AND TAZOBACTAM 25 GRAM(S): 4; .5 INJECTION, POWDER, LYOPHILIZED, FOR SOLUTION INTRAVENOUS at 17:20

## 2023-01-01 RX ADMIN — Medication 5 MILLIGRAM(S): at 06:29

## 2023-01-01 RX ADMIN — OXYCODONE HYDROCHLORIDE 10 MILLIGRAM(S): 5 TABLET ORAL at 16:48

## 2023-01-01 RX ADMIN — OXYCODONE HYDROCHLORIDE 10 MILLIGRAM(S): 5 TABLET ORAL at 07:02

## 2023-01-01 RX ADMIN — Medication 5 MILLIGRAM(S): at 23:10

## 2023-01-01 RX ADMIN — PANTOPRAZOLE SODIUM 40 MILLIGRAM(S): 20 TABLET, DELAYED RELEASE ORAL at 17:34

## 2023-01-01 RX ADMIN — Medication 1300 MILLIGRAM(S): at 05:16

## 2023-01-01 RX ADMIN — OXYCODONE HYDROCHLORIDE 10 MILLIGRAM(S): 5 TABLET ORAL at 04:00

## 2023-01-01 RX ADMIN — OXYCODONE HYDROCHLORIDE 10 MILLIGRAM(S): 5 TABLET ORAL at 06:17

## 2023-01-01 RX ADMIN — OXYCODONE HYDROCHLORIDE 10 MILLIGRAM(S): 5 TABLET ORAL at 05:44

## 2023-01-01 RX ADMIN — PANTOPRAZOLE SODIUM 40 MILLIGRAM(S): 20 TABLET, DELAYED RELEASE ORAL at 05:08

## 2023-01-01 RX ADMIN — OXYCODONE HYDROCHLORIDE 10 MILLIGRAM(S): 5 TABLET ORAL at 01:06

## 2023-01-01 RX ADMIN — Medication 1 CAPSULE(S): at 17:59

## 2023-01-01 RX ADMIN — OXYCODONE HYDROCHLORIDE 10 MILLIGRAM(S): 5 TABLET ORAL at 03:41

## 2023-01-01 RX ADMIN — HEPARIN SODIUM 13 UNIT(S)/HR: 5000 INJECTION INTRAVENOUS; SUBCUTANEOUS at 20:08

## 2023-01-01 RX ADMIN — OXYCODONE HYDROCHLORIDE 7.5 MILLIGRAM(S): 5 TABLET ORAL at 11:28

## 2023-01-01 RX ADMIN — OXYCODONE HYDROCHLORIDE 10 MILLIGRAM(S): 5 TABLET ORAL at 05:27

## 2023-01-01 RX ADMIN — OXYCODONE HYDROCHLORIDE 10 MILLIGRAM(S): 5 TABLET ORAL at 14:16

## 2023-01-01 RX ADMIN — OXYCODONE HYDROCHLORIDE 2.5 MILLIGRAM(S): 5 TABLET ORAL at 12:54

## 2023-01-01 RX ADMIN — OXYCODONE HYDROCHLORIDE 10 MILLIGRAM(S): 5 TABLET ORAL at 23:12

## 2023-01-01 RX ADMIN — Medication 1 TABLET(S): at 15:36

## 2023-01-01 RX ADMIN — PANTOPRAZOLE SODIUM 40 MILLIGRAM(S): 20 TABLET, DELAYED RELEASE ORAL at 16:44

## 2023-01-01 RX ADMIN — OXYCODONE HYDROCHLORIDE 7.5 MILLIGRAM(S): 5 TABLET ORAL at 01:20

## 2023-01-01 RX ADMIN — Medication 1 GRAM(S): at 15:36

## 2023-01-01 RX ADMIN — Medication 1 GRAM(S): at 05:09

## 2023-01-01 RX ADMIN — Medication 1 CAPSULE(S): at 20:17

## 2023-01-01 RX ADMIN — Medication 0.5 MILLIGRAM(S): at 08:53

## 2023-01-01 RX ADMIN — MIDAZOLAM HYDROCHLORIDE 1.32 MG/KG/HR: 1 INJECTION, SOLUTION INTRAMUSCULAR; INTRAVENOUS at 20:44

## 2023-01-01 RX ADMIN — OXYCODONE HYDROCHLORIDE 10 MILLIGRAM(S): 5 TABLET ORAL at 13:22

## 2023-01-01 RX ADMIN — OXYCODONE HYDROCHLORIDE 10 MILLIGRAM(S): 5 TABLET ORAL at 04:13

## 2023-01-01 RX ADMIN — MAGNESIUM OXIDE 400 MG ORAL TABLET 400 MILLIGRAM(S): 241.3 TABLET ORAL at 18:03

## 2023-01-01 RX ADMIN — Medication 100 MILLIGRAM(S): at 12:28

## 2023-01-01 RX ADMIN — Medication 1 CAPSULE(S): at 09:02

## 2023-01-01 RX ADMIN — Medication 5 MILLIGRAM(S): at 05:37

## 2023-01-01 RX ADMIN — OXYCODONE HYDROCHLORIDE 10 MILLIGRAM(S): 5 TABLET ORAL at 12:24

## 2023-01-01 RX ADMIN — CEFTRIAXONE 100 MILLIGRAM(S): 500 INJECTION, POWDER, FOR SOLUTION INTRAMUSCULAR; INTRAVENOUS at 08:36

## 2023-01-01 RX ADMIN — Medication 3 MILLILITER(S): at 06:02

## 2023-01-01 RX ADMIN — Medication 5 MILLIGRAM(S): at 21:08

## 2023-01-01 RX ADMIN — Medication 100 MILLIGRAM(S): at 13:02

## 2023-01-01 RX ADMIN — OXYCODONE HYDROCHLORIDE 10 MILLIGRAM(S): 5 TABLET ORAL at 20:51

## 2023-01-01 RX ADMIN — Medication 650 MILLIGRAM(S): at 21:31

## 2023-01-01 RX ADMIN — OXYCODONE HYDROCHLORIDE 10 MILLIGRAM(S): 5 TABLET ORAL at 19:19

## 2023-01-01 RX ADMIN — Medication 105 MILLIGRAM(S): at 15:11

## 2023-01-01 RX ADMIN — Medication 1 CAPSULE(S): at 12:50

## 2023-01-01 RX ADMIN — OXYCODONE HYDROCHLORIDE 10 MILLIGRAM(S): 5 TABLET ORAL at 00:55

## 2023-01-01 RX ADMIN — Medication 1 CAPSULE(S): at 16:34

## 2023-01-01 RX ADMIN — OXYCODONE HYDROCHLORIDE 10 MILLIGRAM(S): 5 TABLET ORAL at 16:50

## 2023-01-01 RX ADMIN — Medication 5 MILLIGRAM(S): at 23:25

## 2023-01-01 RX ADMIN — HEPARIN SODIUM 5000 UNIT(S): 5000 INJECTION INTRAVENOUS; SUBCUTANEOUS at 05:16

## 2023-01-01 RX ADMIN — PANTOPRAZOLE SODIUM 40 MILLIGRAM(S): 20 TABLET, DELAYED RELEASE ORAL at 17:25

## 2023-01-01 RX ADMIN — Medication 1 CAPSULE(S): at 17:19

## 2023-01-01 RX ADMIN — MAGNESIUM OXIDE 400 MG ORAL TABLET 400 MILLIGRAM(S): 241.3 TABLET ORAL at 19:57

## 2023-01-01 RX ADMIN — OXYCODONE HYDROCHLORIDE 7.5 MILLIGRAM(S): 5 TABLET ORAL at 19:30

## 2023-01-01 RX ADMIN — PIPERACILLIN AND TAZOBACTAM 25 GRAM(S): 4; .5 INJECTION, POWDER, LYOPHILIZED, FOR SOLUTION INTRAVENOUS at 17:29

## 2023-01-01 RX ADMIN — PIPERACILLIN AND TAZOBACTAM 25 GRAM(S): 4; .5 INJECTION, POWDER, LYOPHILIZED, FOR SOLUTION INTRAVENOUS at 23:50

## 2023-01-01 RX ADMIN — MAGNESIUM OXIDE 400 MG ORAL TABLET 400 MILLIGRAM(S): 241.3 TABLET ORAL at 16:44

## 2023-01-01 RX ADMIN — OXYCODONE HYDROCHLORIDE 10 MILLIGRAM(S): 5 TABLET ORAL at 21:27

## 2023-01-01 RX ADMIN — HEPARIN SODIUM 5000 UNIT(S): 5000 INJECTION INTRAVENOUS; SUBCUTANEOUS at 15:37

## 2023-01-01 RX ADMIN — LIDOCAINE 1 PATCH: 4 CREAM TOPICAL at 20:02

## 2023-01-01 RX ADMIN — OXYCODONE HYDROCHLORIDE 10 MILLIGRAM(S): 5 TABLET ORAL at 18:30

## 2023-01-01 RX ADMIN — Medication 1 TABLET(S): at 12:01

## 2023-01-01 RX ADMIN — Medication 1 CAPSULE(S): at 18:01

## 2023-01-01 RX ADMIN — Medication 1300 MILLIGRAM(S): at 12:43

## 2023-01-01 RX ADMIN — Medication 1 CAPSULE(S): at 08:44

## 2023-01-01 RX ADMIN — HYDROMORPHONE HYDROCHLORIDE 0.2 MILLIGRAM(S): 2 INJECTION INTRAMUSCULAR; INTRAVENOUS; SUBCUTANEOUS at 01:42

## 2023-01-01 RX ADMIN — Medication 5 MILLIGRAM(S): at 22:03

## 2023-01-01 RX ADMIN — Medication 30 MILLIGRAM(S): at 05:08

## 2023-01-01 RX ADMIN — OXYCODONE HYDROCHLORIDE 10 MILLIGRAM(S): 5 TABLET ORAL at 13:58

## 2023-01-01 RX ADMIN — Medication 30 MILLIGRAM(S): at 07:25

## 2023-01-01 RX ADMIN — SEVELAMER CARBONATE 800 MILLIGRAM(S): 2400 POWDER, FOR SUSPENSION ORAL at 23:21

## 2023-01-01 RX ADMIN — Medication 1 GRAM(S): at 20:35

## 2023-01-01 RX ADMIN — OLANZAPINE 2.5 MILLIGRAM(S): 15 TABLET, FILM COATED ORAL at 22:46

## 2023-01-01 RX ADMIN — SENNA PLUS 2 TABLET(S): 8.6 TABLET ORAL at 21:47

## 2023-01-01 RX ADMIN — PANTOPRAZOLE SODIUM 40 MILLIGRAM(S): 20 TABLET, DELAYED RELEASE ORAL at 06:17

## 2023-01-01 RX ADMIN — Medication 40 MILLIEQUIVALENT(S): at 15:51

## 2023-01-01 RX ADMIN — FENTANYL CITRATE 50 MICROGRAM(S): 50 INJECTION INTRAVENOUS at 15:44

## 2023-01-01 RX ADMIN — HEPARIN SODIUM 5000 UNIT(S): 5000 INJECTION INTRAVENOUS; SUBCUTANEOUS at 05:03

## 2023-01-01 RX ADMIN — OXYCODONE HYDROCHLORIDE 10 MILLIGRAM(S): 5 TABLET ORAL at 02:44

## 2023-01-01 RX ADMIN — Medication 100 MILLIGRAM(S): at 15:37

## 2023-01-01 RX ADMIN — ONDANSETRON 4 MILLIGRAM(S): 8 TABLET, FILM COATED ORAL at 05:58

## 2023-01-01 RX ADMIN — Medication 1 TABLET(S): at 12:31

## 2023-01-01 RX ADMIN — OXYCODONE HYDROCHLORIDE 10 MILLIGRAM(S): 5 TABLET ORAL at 11:18

## 2023-01-01 RX ADMIN — Medication 5 MILLIGRAM(S): at 16:49

## 2023-01-01 RX ADMIN — APIXABAN 5 MILLIGRAM(S): 2.5 TABLET, FILM COATED ORAL at 06:29

## 2023-01-01 RX ADMIN — Medication 5 MILLIGRAM(S): at 05:44

## 2023-01-01 RX ADMIN — OXYCODONE HYDROCHLORIDE 10 MILLIGRAM(S): 5 TABLET ORAL at 07:49

## 2023-01-01 RX ADMIN — HEPARIN SODIUM 5000 UNIT(S): 5000 INJECTION INTRAVENOUS; SUBCUTANEOUS at 06:37

## 2023-01-01 RX ADMIN — ERYTHROPOIETIN 10000 UNIT(S): 10000 INJECTION, SOLUTION INTRAVENOUS; SUBCUTANEOUS at 07:49

## 2023-01-01 RX ADMIN — OXYCODONE HYDROCHLORIDE 10 MILLIGRAM(S): 5 TABLET ORAL at 20:57

## 2023-01-01 RX ADMIN — CHLORHEXIDINE GLUCONATE 1 APPLICATION(S): 213 SOLUTION TOPICAL at 19:05

## 2023-01-01 RX ADMIN — Medication 3 MILLILITER(S): at 11:12

## 2023-01-01 RX ADMIN — PANTOPRAZOLE SODIUM 40 MILLIGRAM(S): 20 TABLET, DELAYED RELEASE ORAL at 08:00

## 2023-01-01 RX ADMIN — OXYCODONE HYDROCHLORIDE 2.5 MILLIGRAM(S): 5 TABLET ORAL at 02:40

## 2023-01-01 RX ADMIN — Medication 12.5 MILLILITER(S): at 10:24

## 2023-01-01 RX ADMIN — Medication 1 TABLET(S): at 13:03

## 2023-01-01 RX ADMIN — OXYCODONE HYDROCHLORIDE 5 MILLIGRAM(S): 5 TABLET ORAL at 02:16

## 2023-01-01 RX ADMIN — HEPARIN SODIUM 5000 UNIT(S): 5000 INJECTION INTRAVENOUS; SUBCUTANEOUS at 21:29

## 2023-01-01 RX ADMIN — CHLORHEXIDINE GLUCONATE 1 APPLICATION(S): 213 SOLUTION TOPICAL at 06:28

## 2023-01-01 RX ADMIN — OXYCODONE HYDROCHLORIDE 10 MILLIGRAM(S): 5 TABLET ORAL at 17:52

## 2023-01-01 RX ADMIN — CALCITRIOL 0.5 MICROGRAM(S): 0.5 CAPSULE ORAL at 12:37

## 2023-01-01 RX ADMIN — OXYCODONE HYDROCHLORIDE 10 MILLIGRAM(S): 5 TABLET ORAL at 17:33

## 2023-01-01 RX ADMIN — PIPERACILLIN AND TAZOBACTAM 25 GRAM(S): 4; .5 INJECTION, POWDER, LYOPHILIZED, FOR SOLUTION INTRAVENOUS at 05:53

## 2023-01-01 RX ADMIN — OXYCODONE HYDROCHLORIDE 10 MILLIGRAM(S): 5 TABLET ORAL at 22:11

## 2023-01-01 RX ADMIN — OXYCODONE HYDROCHLORIDE 10 MILLIGRAM(S): 5 TABLET ORAL at 07:56

## 2023-01-01 RX ADMIN — Medication 5 MILLIGRAM(S): at 12:54

## 2023-01-01 RX ADMIN — MORPHINE SULFATE 2 MILLIGRAM(S): 50 CAPSULE, EXTENDED RELEASE ORAL at 06:54

## 2023-01-01 RX ADMIN — TIZANIDINE 2 MILLIGRAM(S): 4 TABLET ORAL at 01:33

## 2023-01-01 RX ADMIN — OXYCODONE HYDROCHLORIDE 10 MILLIGRAM(S): 5 TABLET ORAL at 22:35

## 2023-01-01 RX ADMIN — Medication 1 CAPSULE(S): at 13:00

## 2023-01-01 RX ADMIN — OXYCODONE HYDROCHLORIDE 5 MILLIGRAM(S): 5 TABLET ORAL at 09:28

## 2023-01-01 RX ADMIN — CALCITRIOL 0.5 MICROGRAM(S): 0.5 CAPSULE ORAL at 17:45

## 2023-01-01 RX ADMIN — MORPHINE SULFATE 4 MILLIGRAM(S): 50 CAPSULE, EXTENDED RELEASE ORAL at 09:46

## 2023-01-01 RX ADMIN — Medication 600 MILLIGRAM(S): at 20:36

## 2023-01-01 RX ADMIN — OXYCODONE HYDROCHLORIDE 10 MILLIGRAM(S): 5 TABLET ORAL at 16:15

## 2023-01-01 RX ADMIN — Medication 5 MILLIGRAM(S): at 05:41

## 2023-01-01 RX ADMIN — Medication 1 CAPSULE(S): at 17:11

## 2023-01-01 RX ADMIN — Medication 1 GRAM(S): at 13:50

## 2023-01-01 RX ADMIN — POLYETHYLENE GLYCOL 3350 17 GRAM(S): 17 POWDER, FOR SOLUTION ORAL at 17:23

## 2023-01-01 RX ADMIN — Medication 1 CAPSULE(S): at 15:36

## 2023-01-01 RX ADMIN — Medication 100 MILLIGRAM(S): at 12:35

## 2023-01-01 RX ADMIN — Medication 5 MILLIGRAM(S): at 08:36

## 2023-01-01 RX ADMIN — OXYCODONE HYDROCHLORIDE 10 MILLIGRAM(S): 5 TABLET ORAL at 18:58

## 2023-01-01 RX ADMIN — OXYCODONE HYDROCHLORIDE 7.5 MILLIGRAM(S): 5 TABLET ORAL at 09:35

## 2023-01-01 RX ADMIN — OLANZAPINE 2.5 MILLIGRAM(S): 15 TABLET, FILM COATED ORAL at 21:05

## 2023-01-01 RX ADMIN — CHLORHEXIDINE GLUCONATE 1 APPLICATION(S): 213 SOLUTION TOPICAL at 18:26

## 2023-01-01 RX ADMIN — Medication 1 TABLET(S): at 12:35

## 2023-01-01 RX ADMIN — Medication 30 MILLIGRAM(S): at 05:14

## 2023-01-01 RX ADMIN — OXYCODONE HYDROCHLORIDE 10 MILLIGRAM(S): 5 TABLET ORAL at 19:43

## 2023-01-01 RX ADMIN — Medication 1625 MILLIGRAM(S): at 13:23

## 2023-01-01 RX ADMIN — CALCITRIOL 0.5 MICROGRAM(S): 0.5 CAPSULE ORAL at 15:51

## 2023-01-01 RX ADMIN — Medication 1 GRAM(S): at 00:06

## 2023-01-01 RX ADMIN — HEPARIN SODIUM 5000 UNIT(S): 5000 INJECTION INTRAVENOUS; SUBCUTANEOUS at 06:53

## 2023-01-01 RX ADMIN — Medication 100 GRAM(S): at 09:03

## 2023-01-01 RX ADMIN — OXYCODONE HYDROCHLORIDE 10 MILLIGRAM(S): 5 TABLET ORAL at 06:47

## 2023-01-01 RX ADMIN — MORPHINE SULFATE 2 MILLIGRAM(S): 50 CAPSULE, EXTENDED RELEASE ORAL at 12:15

## 2023-01-01 RX ADMIN — OXYCODONE HYDROCHLORIDE 10 MILLIGRAM(S): 5 TABLET ORAL at 15:22

## 2023-01-01 RX ADMIN — Medication 1300 MILLIGRAM(S): at 15:31

## 2023-01-01 RX ADMIN — MEROPENEM 100 MILLIGRAM(S): 1 INJECTION INTRAVENOUS at 13:25

## 2023-01-01 RX ADMIN — Medication 5 MILLIGRAM(S): at 07:27

## 2023-01-01 RX ADMIN — Medication 1 GRAM(S): at 00:43

## 2023-01-01 RX ADMIN — Medication 1 MILLIGRAM(S): at 11:54

## 2023-01-01 RX ADMIN — Medication 81 MILLIGRAM(S): at 14:57

## 2023-01-01 RX ADMIN — OXYCODONE HYDROCHLORIDE 10 MILLIGRAM(S): 5 TABLET ORAL at 10:42

## 2023-01-01 RX ADMIN — Medication 1 TABLET(S): at 12:17

## 2023-01-01 RX ADMIN — TRAMADOL HYDROCHLORIDE 25 MILLIGRAM(S): 50 TABLET ORAL at 04:41

## 2023-01-01 RX ADMIN — CHLORHEXIDINE GLUCONATE 1 APPLICATION(S): 213 SOLUTION TOPICAL at 12:38

## 2023-01-01 RX ADMIN — CEFTRIAXONE 100 MILLIGRAM(S): 500 INJECTION, POWDER, FOR SOLUTION INTRAMUSCULAR; INTRAVENOUS at 08:15

## 2023-01-01 RX ADMIN — HEPARIN SODIUM 5000 UNIT(S): 5000 INJECTION INTRAVENOUS; SUBCUTANEOUS at 21:07

## 2023-01-01 RX ADMIN — HEPARIN SODIUM 5000 UNIT(S): 5000 INJECTION INTRAVENOUS; SUBCUTANEOUS at 05:00

## 2023-01-01 RX ADMIN — HEPARIN SODIUM 5000 UNIT(S): 5000 INJECTION INTRAVENOUS; SUBCUTANEOUS at 21:47

## 2023-01-01 RX ADMIN — OXYCODONE HYDROCHLORIDE 10 MILLIGRAM(S): 5 TABLET ORAL at 00:29

## 2023-01-01 RX ADMIN — Medication 40 MILLIEQUIVALENT(S): at 16:59

## 2023-01-01 RX ADMIN — Medication 5 MILLIGRAM(S): at 21:46

## 2023-01-01 RX ADMIN — Medication 1 CAPSULE(S): at 15:37

## 2023-01-01 RX ADMIN — LIDOCAINE 1 PATCH: 4 CREAM TOPICAL at 11:58

## 2023-01-01 RX ADMIN — OXYCODONE HYDROCHLORIDE 10 MILLIGRAM(S): 5 TABLET ORAL at 06:36

## 2023-01-01 RX ADMIN — HEPARIN SODIUM 5000 UNIT(S): 5000 INJECTION INTRAVENOUS; SUBCUTANEOUS at 12:42

## 2023-01-01 RX ADMIN — Medication 100 MILLIEQUIVALENT(S): at 10:23

## 2023-01-01 RX ADMIN — CEFTRIAXONE 100 MILLIGRAM(S): 500 INJECTION, POWDER, FOR SOLUTION INTRAMUSCULAR; INTRAVENOUS at 08:20

## 2023-01-01 RX ADMIN — HEPARIN SODIUM 16 UNIT(S)/HR: 5000 INJECTION INTRAVENOUS; SUBCUTANEOUS at 08:49

## 2023-01-01 RX ADMIN — Medication 1 CAPSULE(S): at 13:40

## 2023-01-01 RX ADMIN — Medication 1 CAPSULE(S): at 18:31

## 2023-01-01 RX ADMIN — OXYCODONE HYDROCHLORIDE 10 MILLIGRAM(S): 5 TABLET ORAL at 05:30

## 2023-01-01 RX ADMIN — Medication 1 CAPSULE(S): at 20:35

## 2023-01-01 RX ADMIN — PANTOPRAZOLE SODIUM 40 MILLIGRAM(S): 20 TABLET, DELAYED RELEASE ORAL at 05:23

## 2023-01-01 RX ADMIN — CALCITRIOL 0.25 MICROGRAM(S): 0.5 CAPSULE ORAL at 12:43

## 2023-01-01 RX ADMIN — TIZANIDINE 2 MILLIGRAM(S): 4 TABLET ORAL at 12:36

## 2023-01-01 RX ADMIN — HEPARIN SODIUM 16 UNIT(S)/HR: 5000 INJECTION INTRAVENOUS; SUBCUTANEOUS at 20:17

## 2023-01-01 RX ADMIN — CALCITRIOL 0.5 MICROGRAM(S): 0.5 CAPSULE ORAL at 18:24

## 2023-01-01 RX ADMIN — Medication 5 MILLIGRAM(S): at 22:06

## 2023-01-01 RX ADMIN — Medication 30 MILLIGRAM(S): at 05:59

## 2023-01-01 RX ADMIN — OXYCODONE HYDROCHLORIDE 7.5 MILLIGRAM(S): 5 TABLET ORAL at 14:09

## 2023-01-01 RX ADMIN — OXYCODONE HYDROCHLORIDE 10 MILLIGRAM(S): 5 TABLET ORAL at 01:43

## 2023-01-01 RX ADMIN — OXYCODONE HYDROCHLORIDE 10 MILLIGRAM(S): 5 TABLET ORAL at 04:26

## 2023-01-01 RX ADMIN — Medication 1 TABLET(S): at 14:19

## 2023-01-01 RX ADMIN — PANTOPRAZOLE SODIUM 40 MILLIGRAM(S): 20 TABLET, DELAYED RELEASE ORAL at 19:58

## 2023-01-01 RX ADMIN — Medication 100 GRAM(S): at 16:59

## 2023-01-01 RX ADMIN — OXYCODONE HYDROCHLORIDE 10 MILLIGRAM(S): 5 TABLET ORAL at 17:45

## 2023-01-01 RX ADMIN — CALCITRIOL 0.5 MICROGRAM(S): 0.5 CAPSULE ORAL at 11:46

## 2023-01-01 RX ADMIN — OXYCODONE HYDROCHLORIDE 7.5 MILLIGRAM(S): 5 TABLET ORAL at 11:19

## 2023-01-01 RX ADMIN — OXYCODONE HYDROCHLORIDE 10 MILLIGRAM(S): 5 TABLET ORAL at 13:39

## 2023-01-01 RX ADMIN — Medication 1 GRAM(S): at 18:46

## 2023-01-01 RX ADMIN — Medication 1 CAPSULE(S): at 15:40

## 2023-01-01 RX ADMIN — MORPHINE SULFATE 2 MILLIGRAM(S): 50 CAPSULE, EXTENDED RELEASE ORAL at 12:20

## 2023-01-01 RX ADMIN — CALCITRIOL 0.25 MICROGRAM(S): 0.5 CAPSULE ORAL at 11:20

## 2023-01-01 RX ADMIN — CHLORHEXIDINE GLUCONATE 1 APPLICATION(S): 213 SOLUTION TOPICAL at 17:41

## 2023-01-01 RX ADMIN — OXYCODONE HYDROCHLORIDE 5 MILLIGRAM(S): 5 TABLET ORAL at 00:14

## 2023-01-01 RX ADMIN — Medication 100 MILLIGRAM(S): at 12:46

## 2023-01-01 RX ADMIN — OXYCODONE HYDROCHLORIDE 5 MILLIGRAM(S): 5 TABLET ORAL at 22:59

## 2023-01-01 RX ADMIN — OXYCODONE HYDROCHLORIDE 10 MILLIGRAM(S): 5 TABLET ORAL at 16:05

## 2023-01-01 RX ADMIN — CHLORHEXIDINE GLUCONATE 1 APPLICATION(S): 213 SOLUTION TOPICAL at 05:10

## 2023-01-01 RX ADMIN — OXYCODONE HYDROCHLORIDE 7.5 MILLIGRAM(S): 5 TABLET ORAL at 17:38

## 2023-01-01 RX ADMIN — OXYCODONE HYDROCHLORIDE 10 MILLIGRAM(S): 5 TABLET ORAL at 06:31

## 2023-01-01 RX ADMIN — OXYCODONE HYDROCHLORIDE 10 MILLIGRAM(S): 5 TABLET ORAL at 06:58

## 2023-01-01 RX ADMIN — Medication 30 MILLIGRAM(S): at 05:57

## 2023-01-01 RX ADMIN — OXYCODONE HYDROCHLORIDE 7.5 MILLIGRAM(S): 5 TABLET ORAL at 18:21

## 2023-01-01 RX ADMIN — Medication 1 APPLICATION(S): at 12:54

## 2023-01-01 RX ADMIN — HEPARIN SODIUM 5000 UNIT(S): 5000 INJECTION INTRAVENOUS; SUBCUTANEOUS at 13:23

## 2023-01-01 RX ADMIN — OXYCODONE HYDROCHLORIDE 5 MILLIGRAM(S): 5 TABLET ORAL at 06:52

## 2023-01-01 RX ADMIN — Medication 1 CAPSULE(S): at 18:39

## 2023-01-01 RX ADMIN — OXYCODONE HYDROCHLORIDE 10 MILLIGRAM(S): 5 TABLET ORAL at 18:15

## 2023-01-01 RX ADMIN — OXYCODONE HYDROCHLORIDE 10 MILLIGRAM(S): 5 TABLET ORAL at 07:37

## 2023-01-01 RX ADMIN — HEPARIN SODIUM 5000 UNIT(S): 5000 INJECTION INTRAVENOUS; SUBCUTANEOUS at 15:44

## 2023-01-01 RX ADMIN — Medication 1 MILLIGRAM(S): at 12:28

## 2023-01-01 RX ADMIN — OXYCODONE HYDROCHLORIDE 10 MILLIGRAM(S): 5 TABLET ORAL at 12:14

## 2023-01-01 RX ADMIN — CHLORHEXIDINE GLUCONATE 1 APPLICATION(S): 213 SOLUTION TOPICAL at 06:18

## 2023-01-01 RX ADMIN — MEROPENEM 100 MILLIGRAM(S): 1 INJECTION INTRAVENOUS at 03:53

## 2023-01-01 RX ADMIN — Medication 1 MILLIGRAM(S): at 12:45

## 2023-01-01 RX ADMIN — Medication 5 MILLIGRAM(S): at 14:36

## 2023-01-01 RX ADMIN — Medication 5 MILLIGRAM(S): at 15:37

## 2023-01-01 RX ADMIN — Medication 1 TABLET(S): at 13:38

## 2023-01-01 RX ADMIN — OXYCODONE HYDROCHLORIDE 7.5 MILLIGRAM(S): 5 TABLET ORAL at 11:49

## 2023-01-01 RX ADMIN — Medication 1 TABLET(S): at 13:40

## 2023-01-01 RX ADMIN — OXYCODONE HYDROCHLORIDE 10 MILLIGRAM(S): 5 TABLET ORAL at 09:04

## 2023-01-01 RX ADMIN — APIXABAN 2.5 MILLIGRAM(S): 2.5 TABLET, FILM COATED ORAL at 17:51

## 2023-01-01 RX ADMIN — OXYCODONE HYDROCHLORIDE 10 MILLIGRAM(S): 5 TABLET ORAL at 00:40

## 2023-01-01 RX ADMIN — Medication 70 MILLIGRAM(S): at 20:09

## 2023-01-01 RX ADMIN — OXYCODONE HYDROCHLORIDE 10 MILLIGRAM(S): 5 TABLET ORAL at 18:51

## 2023-01-01 RX ADMIN — Medication 200 INTERNATIONAL UNIT(S): at 11:33

## 2023-01-01 RX ADMIN — OXYCODONE HYDROCHLORIDE 10 MILLIGRAM(S): 5 TABLET ORAL at 00:03

## 2023-01-01 RX ADMIN — MAGNESIUM OXIDE 400 MG ORAL TABLET 400 MILLIGRAM(S): 241.3 TABLET ORAL at 19:02

## 2023-01-01 RX ADMIN — OXYCODONE HYDROCHLORIDE 10 MILLIGRAM(S): 5 TABLET ORAL at 04:57

## 2023-01-01 RX ADMIN — POLYETHYLENE GLYCOL 3350 17 GRAM(S): 17 POWDER, FOR SOLUTION ORAL at 18:46

## 2023-01-01 RX ADMIN — HYDROMORPHONE HYDROCHLORIDE 0.2 MILLIGRAM(S): 2 INJECTION INTRAMUSCULAR; INTRAVENOUS; SUBCUTANEOUS at 02:12

## 2023-01-01 RX ADMIN — HYDROMORPHONE HYDROCHLORIDE 1 MILLIGRAM(S): 2 INJECTION INTRAMUSCULAR; INTRAVENOUS; SUBCUTANEOUS at 11:07

## 2023-01-01 RX ADMIN — Medication 5 MILLIGRAM(S): at 05:28

## 2023-01-01 RX ADMIN — PANTOPRAZOLE SODIUM 40 MILLIGRAM(S): 20 TABLET, DELAYED RELEASE ORAL at 19:09

## 2023-01-01 RX ADMIN — Medication 1 CAPSULE(S): at 17:00

## 2023-01-01 RX ADMIN — OXYCODONE HYDROCHLORIDE 7.5 MILLIGRAM(S): 5 TABLET ORAL at 22:50

## 2023-01-01 RX ADMIN — Medication 5 MILLIGRAM(S): at 15:52

## 2023-01-01 RX ADMIN — Medication 1 CAPSULE(S): at 18:12

## 2023-01-01 RX ADMIN — OXYCODONE HYDROCHLORIDE 10 MILLIGRAM(S): 5 TABLET ORAL at 15:36

## 2023-01-01 RX ADMIN — OXYCODONE HYDROCHLORIDE 10 MILLIGRAM(S): 5 TABLET ORAL at 16:35

## 2023-01-01 RX ADMIN — OXYCODONE HYDROCHLORIDE 10 MILLIGRAM(S): 5 TABLET ORAL at 21:51

## 2023-01-01 RX ADMIN — Medication 1 CAPSULE(S): at 17:39

## 2023-01-01 RX ADMIN — Medication 105 MILLIGRAM(S): at 00:14

## 2023-01-01 RX ADMIN — PANTOPRAZOLE SODIUM 40 MILLIGRAM(S): 20 TABLET, DELAYED RELEASE ORAL at 05:42

## 2023-01-01 RX ADMIN — OXYCODONE HYDROCHLORIDE 10 MILLIGRAM(S): 5 TABLET ORAL at 09:42

## 2023-01-01 RX ADMIN — Medication 30 MILLILITER(S): at 10:12

## 2023-01-01 RX ADMIN — OXYCODONE HYDROCHLORIDE 10 MILLIGRAM(S): 5 TABLET ORAL at 15:10

## 2023-01-01 RX ADMIN — OXYCODONE HYDROCHLORIDE 10 MILLIGRAM(S): 5 TABLET ORAL at 06:22

## 2023-01-01 RX ADMIN — CHLORHEXIDINE GLUCONATE 1 APPLICATION(S): 213 SOLUTION TOPICAL at 06:23

## 2023-01-01 RX ADMIN — SEVELAMER CARBONATE 800 MILLIGRAM(S): 2400 POWDER, FOR SUSPENSION ORAL at 06:18

## 2023-01-01 RX ADMIN — OXYCODONE HYDROCHLORIDE 10 MILLIGRAM(S): 5 TABLET ORAL at 01:44

## 2023-01-01 RX ADMIN — OXYCODONE HYDROCHLORIDE 7.5 MILLIGRAM(S): 5 TABLET ORAL at 16:50

## 2023-01-01 RX ADMIN — OXYCODONE HYDROCHLORIDE 10 MILLIGRAM(S): 5 TABLET ORAL at 08:42

## 2023-01-01 RX ADMIN — OXYCODONE HYDROCHLORIDE 10 MILLIGRAM(S): 5 TABLET ORAL at 17:44

## 2023-01-01 RX ADMIN — LIDOCAINE 1 PATCH: 4 CREAM TOPICAL at 15:50

## 2023-01-01 RX ADMIN — OXYCODONE HYDROCHLORIDE 10 MILLIGRAM(S): 5 TABLET ORAL at 12:44

## 2023-01-01 RX ADMIN — Medication 325 MILLIGRAM(S): at 14:18

## 2023-01-01 RX ADMIN — OXYCODONE HYDROCHLORIDE 10 MILLIGRAM(S): 5 TABLET ORAL at 08:09

## 2023-01-01 RX ADMIN — OXYCODONE HYDROCHLORIDE 10 MILLIGRAM(S): 5 TABLET ORAL at 12:57

## 2023-01-01 RX ADMIN — Medication 30 MILLIGRAM(S): at 06:44

## 2023-01-01 RX ADMIN — CALCITRIOL 0.5 MICROGRAM(S): 0.5 CAPSULE ORAL at 11:58

## 2023-01-01 RX ADMIN — PANTOPRAZOLE SODIUM 40 MILLIGRAM(S): 20 TABLET, DELAYED RELEASE ORAL at 05:12

## 2023-01-01 RX ADMIN — OXYCODONE HYDROCHLORIDE 2.5 MILLIGRAM(S): 5 TABLET ORAL at 01:39

## 2023-01-01 RX ADMIN — OXYCODONE HYDROCHLORIDE 10 MILLIGRAM(S): 5 TABLET ORAL at 11:29

## 2023-01-01 RX ADMIN — APIXABAN 5 MILLIGRAM(S): 2.5 TABLET, FILM COATED ORAL at 09:33

## 2023-01-01 RX ADMIN — OXYCODONE HYDROCHLORIDE 10 MILLIGRAM(S): 5 TABLET ORAL at 17:18

## 2023-01-01 RX ADMIN — Medication 1 CAPSULE(S): at 12:29

## 2023-01-01 RX ADMIN — Medication 5 MILLIGRAM(S): at 08:05

## 2023-01-01 RX ADMIN — OXYCODONE HYDROCHLORIDE 10 MILLIGRAM(S): 5 TABLET ORAL at 20:16

## 2023-01-01 RX ADMIN — OXYCODONE HYDROCHLORIDE 7.5 MILLIGRAM(S): 5 TABLET ORAL at 09:02

## 2023-01-01 RX ADMIN — Medication 1 CAPSULE(S): at 14:59

## 2023-01-01 RX ADMIN — OXYCODONE HYDROCHLORIDE 10 MILLIGRAM(S): 5 TABLET ORAL at 10:06

## 2023-01-01 RX ADMIN — HEPARIN SODIUM 11 UNIT(S)/HR: 5000 INJECTION INTRAVENOUS; SUBCUTANEOUS at 15:03

## 2023-01-01 RX ADMIN — HEPARIN SODIUM 12 UNIT(S)/HR: 5000 INJECTION INTRAVENOUS; SUBCUTANEOUS at 04:31

## 2023-01-01 RX ADMIN — Medication 5 MILLIGRAM(S): at 13:00

## 2023-01-01 RX ADMIN — Medication 1300 MILLIGRAM(S): at 22:44

## 2023-01-01 RX ADMIN — Medication 1 CAPSULE(S): at 19:05

## 2023-01-01 RX ADMIN — OXYCODONE HYDROCHLORIDE 7.5 MILLIGRAM(S): 5 TABLET ORAL at 09:41

## 2023-01-01 RX ADMIN — OXYCODONE HYDROCHLORIDE 10 MILLIGRAM(S): 5 TABLET ORAL at 08:34

## 2023-01-01 RX ADMIN — OXYCODONE HYDROCHLORIDE 10 MILLIGRAM(S): 5 TABLET ORAL at 21:29

## 2023-01-01 RX ADMIN — Medication 1 CAPSULE(S): at 10:27

## 2023-01-01 RX ADMIN — OXYCODONE HYDROCHLORIDE 10 MILLIGRAM(S): 5 TABLET ORAL at 14:51

## 2023-01-01 RX ADMIN — SENNA PLUS 2 TABLET(S): 8.6 TABLET ORAL at 21:38

## 2023-01-01 RX ADMIN — Medication 75 MILLILITER(S): at 08:44

## 2023-01-01 RX ADMIN — APIXABAN 5 MILLIGRAM(S): 2.5 TABLET, FILM COATED ORAL at 22:52

## 2023-01-01 RX ADMIN — Medication 100 GRAM(S): at 09:09

## 2023-01-01 RX ADMIN — PANTOPRAZOLE SODIUM 40 MILLIGRAM(S): 20 TABLET, DELAYED RELEASE ORAL at 06:03

## 2023-01-01 RX ADMIN — OXYCODONE HYDROCHLORIDE 10 MILLIGRAM(S): 5 TABLET ORAL at 06:43

## 2023-01-01 RX ADMIN — OXYCODONE HYDROCHLORIDE 7.5 MILLIGRAM(S): 5 TABLET ORAL at 23:07

## 2023-01-01 RX ADMIN — HEPARIN SODIUM 5000 UNIT(S): 5000 INJECTION INTRAVENOUS; SUBCUTANEOUS at 05:36

## 2023-01-01 RX ADMIN — Medication 40 MILLIEQUIVALENT(S): at 18:04

## 2023-01-01 RX ADMIN — APIXABAN 5 MILLIGRAM(S): 2.5 TABLET, FILM COATED ORAL at 22:11

## 2023-01-01 RX ADMIN — OXYCODONE HYDROCHLORIDE 10 MILLIGRAM(S): 5 TABLET ORAL at 12:05

## 2023-01-01 RX ADMIN — Medication 5 MILLIGRAM(S): at 05:14

## 2023-01-01 RX ADMIN — OXYCODONE HYDROCHLORIDE 10 MILLIGRAM(S): 5 TABLET ORAL at 04:44

## 2023-01-01 RX ADMIN — HEPARIN SODIUM 5000 UNIT(S): 5000 INJECTION INTRAVENOUS; SUBCUTANEOUS at 07:05

## 2023-01-01 RX ADMIN — Medication 1 CAPSULE(S): at 11:57

## 2023-01-01 RX ADMIN — Medication 1300 MILLIGRAM(S): at 14:40

## 2023-01-01 RX ADMIN — PANTOPRAZOLE SODIUM 40 MILLIGRAM(S): 20 TABLET, DELAYED RELEASE ORAL at 19:07

## 2023-01-01 RX ADMIN — Medication 25 MILLIGRAM(S): at 09:04

## 2023-01-01 RX ADMIN — OXYCODONE HYDROCHLORIDE 5 MILLIGRAM(S): 5 TABLET ORAL at 16:00

## 2023-01-01 RX ADMIN — OXYCODONE HYDROCHLORIDE 10 MILLIGRAM(S): 5 TABLET ORAL at 17:39

## 2023-01-01 RX ADMIN — OXYCODONE HYDROCHLORIDE 10 MILLIGRAM(S): 5 TABLET ORAL at 22:10

## 2023-01-01 RX ADMIN — Medication 1 CAPSULE(S): at 14:19

## 2023-01-01 RX ADMIN — Medication 5 MILLIGRAM(S): at 19:09

## 2023-01-01 RX ADMIN — OXYCODONE HYDROCHLORIDE 10 MILLIGRAM(S): 5 TABLET ORAL at 03:27

## 2023-01-01 RX ADMIN — OXYCODONE HYDROCHLORIDE 10 MILLIGRAM(S): 5 TABLET ORAL at 02:00

## 2023-01-01 RX ADMIN — OXYCODONE HYDROCHLORIDE 7.5 MILLIGRAM(S): 5 TABLET ORAL at 21:03

## 2023-01-01 RX ADMIN — OXYCODONE HYDROCHLORIDE 10 MILLIGRAM(S): 5 TABLET ORAL at 05:43

## 2023-01-01 RX ADMIN — ONDANSETRON 4 MILLIGRAM(S): 8 TABLET, FILM COATED ORAL at 02:57

## 2023-01-01 RX ADMIN — Medication 5 MILLIGRAM(S): at 13:19

## 2023-01-01 RX ADMIN — CHLORHEXIDINE GLUCONATE 1 APPLICATION(S): 213 SOLUTION TOPICAL at 17:47

## 2023-01-01 RX ADMIN — CHLORHEXIDINE GLUCONATE 1 APPLICATION(S): 213 SOLUTION TOPICAL at 05:37

## 2023-01-01 RX ADMIN — PIPERACILLIN AND TAZOBACTAM 25 GRAM(S): 4; .5 INJECTION, POWDER, LYOPHILIZED, FOR SOLUTION INTRAVENOUS at 00:03

## 2023-01-01 RX ADMIN — CALCITRIOL 0.5 MICROGRAM(S): 0.5 CAPSULE ORAL at 13:50

## 2023-01-01 RX ADMIN — OXYCODONE HYDROCHLORIDE 10 MILLIGRAM(S): 5 TABLET ORAL at 11:47

## 2023-01-01 RX ADMIN — SODIUM CHLORIDE 75 MILLILITER(S): 9 INJECTION, SOLUTION INTRAVENOUS at 23:06

## 2023-01-01 RX ADMIN — OXYCODONE HYDROCHLORIDE 10 MILLIGRAM(S): 5 TABLET ORAL at 22:03

## 2023-01-01 RX ADMIN — HEPARIN SODIUM 15 UNIT(S)/HR: 5000 INJECTION INTRAVENOUS; SUBCUTANEOUS at 05:04

## 2023-01-01 RX ADMIN — OXYCODONE HYDROCHLORIDE 7.5 MILLIGRAM(S): 5 TABLET ORAL at 09:32

## 2023-01-01 RX ADMIN — OXYCODONE HYDROCHLORIDE 10 MILLIGRAM(S): 5 TABLET ORAL at 16:30

## 2023-01-01 RX ADMIN — PANTOPRAZOLE SODIUM 40 MILLIGRAM(S): 20 TABLET, DELAYED RELEASE ORAL at 18:45

## 2023-01-01 RX ADMIN — HYDROMORPHONE HYDROCHLORIDE 0.2 MILLIGRAM(S): 2 INJECTION INTRAMUSCULAR; INTRAVENOUS; SUBCUTANEOUS at 06:08

## 2023-01-01 RX ADMIN — Medication 1300 MILLIGRAM(S): at 05:15

## 2023-01-01 RX ADMIN — APIXABAN 5 MILLIGRAM(S): 2.5 TABLET, FILM COATED ORAL at 18:24

## 2023-01-01 RX ADMIN — Medication 1 CAPSULE(S): at 11:55

## 2023-01-01 RX ADMIN — Medication 100 MILLIGRAM(S): at 11:33

## 2023-01-01 RX ADMIN — OXYCODONE HYDROCHLORIDE 10 MILLIGRAM(S): 5 TABLET ORAL at 12:47

## 2023-01-01 RX ADMIN — CALCITRIOL 0.5 MICROGRAM(S): 0.5 CAPSULE ORAL at 15:36

## 2023-01-01 RX ADMIN — OXYCODONE HYDROCHLORIDE 10 MILLIGRAM(S): 5 TABLET ORAL at 05:10

## 2023-01-01 RX ADMIN — GABAPENTIN 100 MILLIGRAM(S): 400 CAPSULE ORAL at 22:52

## 2023-01-01 RX ADMIN — Medication 5 MILLIGRAM(S): at 22:46

## 2023-01-01 RX ADMIN — HEPARIN SODIUM 5000 UNIT(S): 5000 INJECTION INTRAVENOUS; SUBCUTANEOUS at 21:34

## 2023-01-01 RX ADMIN — PANTOPRAZOLE SODIUM 40 MILLIGRAM(S): 20 TABLET, DELAYED RELEASE ORAL at 17:19

## 2023-01-01 RX ADMIN — LIDOCAINE 1 PATCH: 4 CREAM TOPICAL at 01:31

## 2023-01-01 RX ADMIN — OXYCODONE HYDROCHLORIDE 10 MILLIGRAM(S): 5 TABLET ORAL at 06:44

## 2023-01-01 RX ADMIN — OXYCODONE HYDROCHLORIDE 10 MILLIGRAM(S): 5 TABLET ORAL at 12:27

## 2023-01-01 RX ADMIN — Medication 30 MILLIGRAM(S): at 06:03

## 2023-01-01 RX ADMIN — Medication 100 MILLIGRAM(S): at 15:35

## 2023-01-01 RX ADMIN — OXYCODONE HYDROCHLORIDE 7.5 MILLIGRAM(S): 5 TABLET ORAL at 13:57

## 2023-01-01 RX ADMIN — SODIUM CHLORIDE 60 MILLILITER(S): 9 INJECTION, SOLUTION INTRAVENOUS at 16:52

## 2023-01-01 RX ADMIN — LOSARTAN POTASSIUM 25 MILLIGRAM(S): 100 TABLET, FILM COATED ORAL at 05:53

## 2023-01-01 RX ADMIN — Medication 5 MILLIGRAM(S): at 21:52

## 2023-01-01 RX ADMIN — MORPHINE SULFATE 2 MILLIGRAM(S): 50 CAPSULE, EXTENDED RELEASE ORAL at 12:30

## 2023-01-01 RX ADMIN — Medication 1 CAPSULE(S): at 16:50

## 2023-01-01 RX ADMIN — OXYCODONE HYDROCHLORIDE 10 MILLIGRAM(S): 5 TABLET ORAL at 19:34

## 2023-01-01 RX ADMIN — APIXABAN 5 MILLIGRAM(S): 2.5 TABLET, FILM COATED ORAL at 23:03

## 2023-01-01 RX ADMIN — SEVELAMER CARBONATE 800 MILLIGRAM(S): 2400 POWDER, FOR SUSPENSION ORAL at 05:06

## 2023-01-01 RX ADMIN — MORPHINE SULFATE 2 MILLIGRAM(S): 50 CAPSULE, EXTENDED RELEASE ORAL at 11:58

## 2023-01-01 RX ADMIN — OXYCODONE HYDROCHLORIDE 10 MILLIGRAM(S): 5 TABLET ORAL at 01:03

## 2023-01-01 RX ADMIN — PANTOPRAZOLE SODIUM 40 MILLIGRAM(S): 20 TABLET, DELAYED RELEASE ORAL at 09:27

## 2023-01-01 RX ADMIN — Medication 3 MILLILITER(S): at 05:13

## 2023-01-01 RX ADMIN — SODIUM CHLORIDE 60 MILLILITER(S): 9 INJECTION INTRAMUSCULAR; INTRAVENOUS; SUBCUTANEOUS at 00:03

## 2023-01-01 RX ADMIN — HEPARIN SODIUM 5000 UNIT(S): 5000 INJECTION INTRAVENOUS; SUBCUTANEOUS at 05:54

## 2023-01-01 RX ADMIN — HEPARIN SODIUM 5000 UNIT(S): 5000 INJECTION INTRAVENOUS; SUBCUTANEOUS at 06:49

## 2023-01-01 RX ADMIN — OXYCODONE HYDROCHLORIDE 10 MILLIGRAM(S): 5 TABLET ORAL at 22:04

## 2023-01-01 RX ADMIN — Medication 1300 MILLIGRAM(S): at 13:28

## 2023-01-01 RX ADMIN — Medication 1 GRAM(S): at 05:11

## 2023-01-01 RX ADMIN — OXYCODONE HYDROCHLORIDE 10 MILLIGRAM(S): 5 TABLET ORAL at 10:51

## 2023-01-01 RX ADMIN — CALCITRIOL 0.5 MICROGRAM(S): 0.5 CAPSULE ORAL at 13:55

## 2023-01-01 RX ADMIN — APIXABAN 5 MILLIGRAM(S): 2.5 TABLET, FILM COATED ORAL at 21:31

## 2023-01-01 RX ADMIN — CHLORHEXIDINE GLUCONATE 1 APPLICATION(S): 213 SOLUTION TOPICAL at 06:13

## 2023-01-01 RX ADMIN — Medication 1 CAPSULE(S): at 08:57

## 2023-01-01 RX ADMIN — Medication 1 GRAM(S): at 13:03

## 2023-01-01 RX ADMIN — Medication 5 MILLIGRAM(S): at 21:09

## 2023-01-01 RX ADMIN — ERYTHROPOIETIN 10000 UNIT(S): 10000 INJECTION, SOLUTION INTRAVENOUS; SUBCUTANEOUS at 07:36

## 2023-01-01 RX ADMIN — PANTOPRAZOLE SODIUM 40 MILLIGRAM(S): 20 TABLET, DELAYED RELEASE ORAL at 17:39

## 2023-01-01 RX ADMIN — Medication 1 CAPSULE(S): at 17:32

## 2023-01-01 RX ADMIN — PANTOPRAZOLE SODIUM 40 MILLIGRAM(S): 20 TABLET, DELAYED RELEASE ORAL at 08:19

## 2023-01-01 RX ADMIN — Medication 105 MILLIGRAM(S): at 21:05

## 2023-01-01 RX ADMIN — Medication 1 MILLIGRAM(S): at 18:47

## 2023-01-01 RX ADMIN — OXYCODONE HYDROCHLORIDE 10 MILLIGRAM(S): 5 TABLET ORAL at 07:21

## 2023-01-01 RX ADMIN — LIDOCAINE 1 PATCH: 4 CREAM TOPICAL at 19:45

## 2023-01-01 RX ADMIN — OXYCODONE HYDROCHLORIDE 10 MILLIGRAM(S): 5 TABLET ORAL at 09:16

## 2023-01-01 RX ADMIN — OXYCODONE HYDROCHLORIDE 5 MILLIGRAM(S): 5 TABLET ORAL at 07:55

## 2023-01-01 RX ADMIN — Medication 5 MILLIGRAM(S): at 06:24

## 2023-01-01 RX ADMIN — Medication 1 MILLIGRAM(S): at 17:52

## 2023-01-01 RX ADMIN — OXYCODONE HYDROCHLORIDE 10 MILLIGRAM(S): 5 TABLET ORAL at 23:48

## 2023-01-01 RX ADMIN — Medication 1 CAPSULE(S): at 08:14

## 2023-01-01 RX ADMIN — Medication 100 MILLIGRAM(S): at 13:51

## 2023-01-01 RX ADMIN — PANTOPRAZOLE SODIUM 40 MILLIGRAM(S): 20 TABLET, DELAYED RELEASE ORAL at 18:40

## 2023-01-01 RX ADMIN — OXYCODONE HYDROCHLORIDE 7.5 MILLIGRAM(S): 5 TABLET ORAL at 23:40

## 2023-01-01 RX ADMIN — OXYCODONE HYDROCHLORIDE 7.5 MILLIGRAM(S): 5 TABLET ORAL at 05:02

## 2023-01-01 RX ADMIN — Medication 1 CAPSULE(S): at 13:18

## 2023-01-01 RX ADMIN — Medication 100 MILLIGRAM(S): at 14:21

## 2023-01-01 RX ADMIN — OXYCODONE HYDROCHLORIDE 7.5 MILLIGRAM(S): 5 TABLET ORAL at 04:50

## 2023-01-01 RX ADMIN — OXYCODONE HYDROCHLORIDE 5 MILLIGRAM(S): 5 TABLET ORAL at 21:37

## 2023-01-01 RX ADMIN — OXYCODONE HYDROCHLORIDE 7.5 MILLIGRAM(S): 5 TABLET ORAL at 15:01

## 2023-01-01 RX ADMIN — OXYCODONE HYDROCHLORIDE 7.5 MILLIGRAM(S): 5 TABLET ORAL at 23:31

## 2023-01-01 RX ADMIN — Medication 1 APPLICATION(S): at 14:58

## 2023-01-01 RX ADMIN — MORPHINE SULFATE 2 MILLIGRAM(S): 50 CAPSULE, EXTENDED RELEASE ORAL at 17:02

## 2023-01-01 RX ADMIN — OXYCODONE HYDROCHLORIDE 10 MILLIGRAM(S): 5 TABLET ORAL at 17:31

## 2023-01-01 RX ADMIN — Medication 1 CAPSULE(S): at 08:31

## 2023-01-01 RX ADMIN — PANTOPRAZOLE SODIUM 40 MILLIGRAM(S): 20 TABLET, DELAYED RELEASE ORAL at 05:51

## 2023-01-01 RX ADMIN — CALCITRIOL 0.5 MICROGRAM(S): 0.5 CAPSULE ORAL at 13:01

## 2023-01-01 RX ADMIN — OXYCODONE HYDROCHLORIDE 7.5 MILLIGRAM(S): 5 TABLET ORAL at 22:03

## 2023-01-01 RX ADMIN — Medication 1 GRAM(S): at 23:08

## 2023-01-01 RX ADMIN — PIPERACILLIN AND TAZOBACTAM 25 GRAM(S): 4; .5 INJECTION, POWDER, LYOPHILIZED, FOR SOLUTION INTRAVENOUS at 00:16

## 2023-01-01 RX ADMIN — OXYCODONE HYDROCHLORIDE 10 MILLIGRAM(S): 5 TABLET ORAL at 06:27

## 2023-01-01 RX ADMIN — Medication 5 MILLIGRAM(S): at 00:56

## 2023-01-01 RX ADMIN — Medication 100 MILLIGRAM(S): at 12:00

## 2023-01-01 RX ADMIN — Medication 5 MILLIGRAM(S): at 05:25

## 2023-01-01 RX ADMIN — Medication 1 CAPSULE(S): at 11:37

## 2023-01-01 RX ADMIN — OXYCODONE HYDROCHLORIDE 7.5 MILLIGRAM(S): 5 TABLET ORAL at 14:39

## 2023-01-01 RX ADMIN — Medication 1 APPLICATION(S): at 19:06

## 2023-01-01 RX ADMIN — Medication 20 MILLIEQUIVALENT(S): at 16:06

## 2023-01-01 RX ADMIN — Medication 200 INTERNATIONAL UNIT(S): at 15:01

## 2023-01-01 RX ADMIN — PANTOPRAZOLE SODIUM 40 MILLIGRAM(S): 20 TABLET, DELAYED RELEASE ORAL at 16:38

## 2023-01-01 RX ADMIN — MUPIROCIN 1 APPLICATION(S): 20 OINTMENT TOPICAL at 09:02

## 2023-01-01 RX ADMIN — Medication 1 TABLET(S): at 13:08

## 2023-01-01 RX ADMIN — Medication 5 MILLIGRAM(S): at 21:00

## 2023-01-01 RX ADMIN — APIXABAN 5 MILLIGRAM(S): 2.5 TABLET, FILM COATED ORAL at 17:58

## 2023-01-01 RX ADMIN — CALCITRIOL 0.5 MICROGRAM(S): 0.5 CAPSULE ORAL at 12:48

## 2023-01-01 RX ADMIN — APIXABAN 5 MILLIGRAM(S): 2.5 TABLET, FILM COATED ORAL at 10:16

## 2023-01-01 RX ADMIN — OXYCODONE HYDROCHLORIDE 10 MILLIGRAM(S): 5 TABLET ORAL at 19:35

## 2023-01-01 RX ADMIN — Medication 1300 MILLIGRAM(S): at 11:55

## 2023-01-01 RX ADMIN — ERYTHROPOIETIN 10000 UNIT(S): 10000 INJECTION, SOLUTION INTRAVENOUS; SUBCUTANEOUS at 07:47

## 2023-01-01 RX ADMIN — Medication 1 CAPSULE(S): at 13:26

## 2023-01-01 RX ADMIN — OXYCODONE HYDROCHLORIDE 10 MILLIGRAM(S): 5 TABLET ORAL at 11:40

## 2023-01-01 RX ADMIN — OXYCODONE HYDROCHLORIDE 10 MILLIGRAM(S): 5 TABLET ORAL at 13:16

## 2023-01-01 RX ADMIN — OXYCODONE HYDROCHLORIDE 10 MILLIGRAM(S): 5 TABLET ORAL at 16:49

## 2023-01-01 RX ADMIN — OXYCODONE HYDROCHLORIDE 2.5 MILLIGRAM(S): 5 TABLET ORAL at 12:24

## 2023-01-01 RX ADMIN — CALCITRIOL 0.25 MICROGRAM(S): 0.5 CAPSULE ORAL at 12:18

## 2023-01-01 RX ADMIN — HEPARIN SODIUM 2100 UNIT(S)/HR: 5000 INJECTION INTRAVENOUS; SUBCUTANEOUS at 20:35

## 2023-01-01 RX ADMIN — HEPARIN SODIUM 16 UNIT(S)/HR: 5000 INJECTION INTRAVENOUS; SUBCUTANEOUS at 02:51

## 2023-01-01 RX ADMIN — Medication 1 CAPSULE(S): at 09:43

## 2023-01-01 RX ADMIN — Medication 5 MILLIGRAM(S): at 06:17

## 2023-01-01 RX ADMIN — PIPERACILLIN AND TAZOBACTAM 25 GRAM(S): 4; .5 INJECTION, POWDER, LYOPHILIZED, FOR SOLUTION INTRAVENOUS at 06:06

## 2023-01-01 RX ADMIN — OXYCODONE HYDROCHLORIDE 10 MILLIGRAM(S): 5 TABLET ORAL at 20:31

## 2023-01-01 RX ADMIN — Medication 100 MILLIGRAM(S): at 13:07

## 2023-01-01 RX ADMIN — OXYCODONE HYDROCHLORIDE 10 MILLIGRAM(S): 5 TABLET ORAL at 00:04

## 2023-01-01 RX ADMIN — Medication 30 MILLIGRAM(S): at 07:40

## 2023-01-01 RX ADMIN — OXYCODONE HYDROCHLORIDE 10 MILLIGRAM(S): 5 TABLET ORAL at 10:40

## 2023-01-01 RX ADMIN — Medication 1 APPLICATION(S): at 21:09

## 2023-01-01 RX ADMIN — APIXABAN 5 MILLIGRAM(S): 2.5 TABLET, FILM COATED ORAL at 13:36

## 2023-01-01 RX ADMIN — OXYCODONE HYDROCHLORIDE 10 MILLIGRAM(S): 5 TABLET ORAL at 09:45

## 2023-01-01 RX ADMIN — HEPARIN SODIUM 5000 UNIT(S): 5000 INJECTION INTRAVENOUS; SUBCUTANEOUS at 23:21

## 2023-01-01 RX ADMIN — LIDOCAINE 1 PATCH: 4 CREAM TOPICAL at 06:19

## 2023-01-01 RX ADMIN — OXYCODONE HYDROCHLORIDE 10 MILLIGRAM(S): 5 TABLET ORAL at 10:50

## 2023-01-01 RX ADMIN — Medication 1300 MILLIGRAM(S): at 21:31

## 2023-01-01 RX ADMIN — CHLORHEXIDINE GLUCONATE 1 APPLICATION(S): 213 SOLUTION TOPICAL at 11:43

## 2023-01-01 RX ADMIN — SEVELAMER CARBONATE 800 MILLIGRAM(S): 2400 POWDER, FOR SUSPENSION ORAL at 09:59

## 2023-01-01 RX ADMIN — Medication 1 GRAM(S): at 06:44

## 2023-01-01 RX ADMIN — PANTOPRAZOLE SODIUM 40 MILLIGRAM(S): 20 TABLET, DELAYED RELEASE ORAL at 06:49

## 2023-01-01 RX ADMIN — Medication 1 CAPSULE(S): at 17:52

## 2023-01-01 RX ADMIN — OXYCODONE HYDROCHLORIDE 7.5 MILLIGRAM(S): 5 TABLET ORAL at 04:30

## 2023-01-01 RX ADMIN — OXYCODONE HYDROCHLORIDE 10 MILLIGRAM(S): 5 TABLET ORAL at 14:36

## 2023-01-01 RX ADMIN — HEPARIN SODIUM 2100 UNIT(S)/HR: 5000 INJECTION INTRAVENOUS; SUBCUTANEOUS at 06:28

## 2023-01-01 RX ADMIN — OXYCODONE HYDROCHLORIDE 10 MILLIGRAM(S): 5 TABLET ORAL at 15:15

## 2023-01-01 RX ADMIN — OXYCODONE HYDROCHLORIDE 2.5 MILLIGRAM(S): 5 TABLET ORAL at 10:03

## 2023-01-01 RX ADMIN — CALCITRIOL 0.5 MICROGRAM(S): 0.5 CAPSULE ORAL at 12:36

## 2023-01-01 RX ADMIN — Medication 5 MILLIGRAM(S): at 13:40

## 2023-01-01 RX ADMIN — OXYCODONE HYDROCHLORIDE 10 MILLIGRAM(S): 5 TABLET ORAL at 01:27

## 2023-01-01 RX ADMIN — Medication 1300 MILLIGRAM(S): at 16:38

## 2023-01-01 RX ADMIN — OXYCODONE HYDROCHLORIDE 10 MILLIGRAM(S): 5 TABLET ORAL at 17:40

## 2023-01-01 RX ADMIN — HEPARIN SODIUM 5000 UNIT(S): 5000 INJECTION INTRAVENOUS; SUBCUTANEOUS at 17:03

## 2023-01-01 RX ADMIN — OXYCODONE HYDROCHLORIDE 7.5 MILLIGRAM(S): 5 TABLET ORAL at 17:45

## 2023-01-01 RX ADMIN — OXYCODONE HYDROCHLORIDE 10 MILLIGRAM(S): 5 TABLET ORAL at 21:40

## 2023-01-01 RX ADMIN — Medication 1 GRAM(S): at 12:56

## 2023-01-01 RX ADMIN — APIXABAN 5 MILLIGRAM(S): 2.5 TABLET, FILM COATED ORAL at 21:22

## 2023-01-01 RX ADMIN — ERYTHROPOIETIN 10000 UNIT(S): 10000 INJECTION, SOLUTION INTRAVENOUS; SUBCUTANEOUS at 07:28

## 2023-01-01 RX ADMIN — OXYCODONE HYDROCHLORIDE 5 MILLIGRAM(S): 5 TABLET ORAL at 18:57

## 2023-01-01 RX ADMIN — Medication 5 MILLIGRAM(S): at 22:44

## 2023-01-01 RX ADMIN — Medication 1 GRAM(S): at 05:13

## 2023-01-01 RX ADMIN — Medication 100 MILLIEQUIVALENT(S): at 12:31

## 2023-01-01 RX ADMIN — APIXABAN 5 MILLIGRAM(S): 2.5 TABLET, FILM COATED ORAL at 10:51

## 2023-01-01 RX ADMIN — LIDOCAINE 1 PATCH: 4 CREAM TOPICAL at 12:41

## 2023-01-01 RX ADMIN — PANTOPRAZOLE SODIUM 40 MILLIGRAM(S): 20 TABLET, DELAYED RELEASE ORAL at 18:31

## 2023-01-01 RX ADMIN — OXYCODONE HYDROCHLORIDE 10 MILLIGRAM(S): 5 TABLET ORAL at 22:36

## 2023-01-01 RX ADMIN — APIXABAN 2.5 MILLIGRAM(S): 2.5 TABLET, FILM COATED ORAL at 18:49

## 2023-01-01 RX ADMIN — OXYCODONE HYDROCHLORIDE 10 MILLIGRAM(S): 5 TABLET ORAL at 11:45

## 2023-01-01 RX ADMIN — MORPHINE SULFATE 2 MILLIGRAM(S): 50 CAPSULE, EXTENDED RELEASE ORAL at 23:15

## 2023-01-01 RX ADMIN — OXYCODONE HYDROCHLORIDE 10 MILLIGRAM(S): 5 TABLET ORAL at 14:57

## 2023-01-01 RX ADMIN — OXYCODONE HYDROCHLORIDE 10 MILLIGRAM(S): 5 TABLET ORAL at 22:22

## 2023-01-01 RX ADMIN — ARIPIPRAZOLE 2 MILLIGRAM(S): 15 TABLET ORAL at 11:57

## 2023-01-01 RX ADMIN — Medication 1 CAPSULE(S): at 19:10

## 2023-01-01 RX ADMIN — OXYCODONE HYDROCHLORIDE 7.5 MILLIGRAM(S): 5 TABLET ORAL at 17:46

## 2023-01-01 RX ADMIN — OXYCODONE HYDROCHLORIDE 7.5 MILLIGRAM(S): 5 TABLET ORAL at 21:58

## 2023-01-01 RX ADMIN — OXYCODONE HYDROCHLORIDE 10 MILLIGRAM(S): 5 TABLET ORAL at 11:16

## 2023-01-01 RX ADMIN — APIXABAN 5 MILLIGRAM(S): 2.5 TABLET, FILM COATED ORAL at 18:13

## 2023-01-01 RX ADMIN — HEPARIN SODIUM 5000 UNIT(S): 5000 INJECTION INTRAVENOUS; SUBCUTANEOUS at 14:43

## 2023-01-01 RX ADMIN — OXYCODONE HYDROCHLORIDE 10 MILLIGRAM(S): 5 TABLET ORAL at 11:25

## 2023-01-01 RX ADMIN — Medication 105 MILLIGRAM(S): at 05:13

## 2023-01-01 RX ADMIN — Medication 1 CAPSULE(S): at 08:03

## 2023-01-01 RX ADMIN — Medication 5 MILLIGRAM(S): at 14:35

## 2023-01-01 RX ADMIN — Medication 5 MILLIGRAM(S): at 06:01

## 2023-01-01 RX ADMIN — PANTOPRAZOLE SODIUM 40 MILLIGRAM(S): 20 TABLET, DELAYED RELEASE ORAL at 18:04

## 2023-01-01 RX ADMIN — PANTOPRAZOLE SODIUM 40 MILLIGRAM(S): 20 TABLET, DELAYED RELEASE ORAL at 06:12

## 2023-01-01 RX ADMIN — OXYCODONE HYDROCHLORIDE 10 MILLIGRAM(S): 5 TABLET ORAL at 17:59

## 2023-01-01 RX ADMIN — OXYCODONE HYDROCHLORIDE 5 MILLIGRAM(S): 5 TABLET ORAL at 01:14

## 2023-01-01 RX ADMIN — Medication 30 MILLIGRAM(S): at 09:59

## 2023-01-01 RX ADMIN — HALOPERIDOL DECANOATE 1 MILLIGRAM(S): 100 INJECTION INTRAMUSCULAR at 21:39

## 2023-01-01 RX ADMIN — Medication 5 MILLIGRAM(S): at 05:43

## 2023-01-01 RX ADMIN — OXYCODONE HYDROCHLORIDE 10 MILLIGRAM(S): 5 TABLET ORAL at 06:57

## 2023-01-01 RX ADMIN — Medication 5 MILLIGRAM(S): at 14:52

## 2023-01-01 RX ADMIN — OXYCODONE HYDROCHLORIDE 10 MILLIGRAM(S): 5 TABLET ORAL at 01:57

## 2023-01-01 RX ADMIN — PANTOPRAZOLE SODIUM 40 MILLIGRAM(S): 20 TABLET, DELAYED RELEASE ORAL at 17:24

## 2023-01-01 RX ADMIN — OXYCODONE HYDROCHLORIDE 10 MILLIGRAM(S): 5 TABLET ORAL at 14:29

## 2023-01-01 RX ADMIN — OXYCODONE HYDROCHLORIDE 7.5 MILLIGRAM(S): 5 TABLET ORAL at 10:28

## 2023-01-01 RX ADMIN — Medication 1 TABLET(S): at 12:55

## 2023-01-01 RX ADMIN — Medication 30 MILLIGRAM(S): at 05:49

## 2023-01-01 RX ADMIN — Medication 5 MILLIGRAM(S): at 07:45

## 2023-01-01 RX ADMIN — HYDROMORPHONE HYDROCHLORIDE 0.2 MILLIGRAM(S): 2 INJECTION INTRAMUSCULAR; INTRAVENOUS; SUBCUTANEOUS at 05:38

## 2023-01-01 RX ADMIN — Medication 5 MILLIGRAM(S): at 05:06

## 2023-01-01 RX ADMIN — OXYCODONE HYDROCHLORIDE 10 MILLIGRAM(S): 5 TABLET ORAL at 08:26

## 2023-01-01 RX ADMIN — Medication 1 TABLET(S): at 11:57

## 2023-01-01 RX ADMIN — OXYCODONE HYDROCHLORIDE 10 MILLIGRAM(S): 5 TABLET ORAL at 02:40

## 2023-01-01 RX ADMIN — Medication 5 MILLIGRAM(S): at 05:55

## 2023-01-01 RX ADMIN — Medication 4000 MILLILITER(S): at 21:15

## 2023-01-01 RX ADMIN — LOSARTAN POTASSIUM 25 MILLIGRAM(S): 100 TABLET, FILM COATED ORAL at 10:49

## 2023-01-01 RX ADMIN — Medication 40 MILLIEQUIVALENT(S): at 21:12

## 2023-01-01 RX ADMIN — APIXABAN 5 MILLIGRAM(S): 2.5 TABLET, FILM COATED ORAL at 21:33

## 2023-01-01 RX ADMIN — OXYCODONE HYDROCHLORIDE 10 MILLIGRAM(S): 5 TABLET ORAL at 19:31

## 2023-01-01 RX ADMIN — OXYCODONE HYDROCHLORIDE 7.5 MILLIGRAM(S): 5 TABLET ORAL at 09:39

## 2023-01-01 RX ADMIN — SODIUM CHLORIDE 50 MILLILITER(S): 9 INJECTION, SOLUTION INTRAVENOUS at 16:06

## 2023-01-01 RX ADMIN — HYDROMORPHONE HYDROCHLORIDE 0.5 MILLIGRAM(S): 2 INJECTION INTRAMUSCULAR; INTRAVENOUS; SUBCUTANEOUS at 15:48

## 2023-01-01 RX ADMIN — SEVELAMER CARBONATE 800 MILLIGRAM(S): 2400 POWDER, FOR SUSPENSION ORAL at 21:31

## 2023-01-01 RX ADMIN — OXYCODONE HYDROCHLORIDE 2.5 MILLIGRAM(S): 5 TABLET ORAL at 22:35

## 2023-01-01 RX ADMIN — POTASSIUM PHOSPHATE, MONOBASIC POTASSIUM PHOSPHATE, DIBASIC 62.5 MILLIMOLE(S): 236; 224 INJECTION, SOLUTION INTRAVENOUS at 13:04

## 2023-01-01 RX ADMIN — Medication 1 TABLET(S): at 13:21

## 2023-01-01 RX ADMIN — PANTOPRAZOLE SODIUM 40 MILLIGRAM(S): 20 TABLET, DELAYED RELEASE ORAL at 05:27

## 2023-01-01 RX ADMIN — Medication 650 MILLIGRAM(S): at 06:44

## 2023-01-01 RX ADMIN — CHLORHEXIDINE GLUCONATE 1 APPLICATION(S): 213 SOLUTION TOPICAL at 06:17

## 2023-01-01 RX ADMIN — Medication 1 CAPSULE(S): at 12:47

## 2023-01-01 RX ADMIN — Medication 650 MILLIGRAM(S): at 14:39

## 2023-01-01 RX ADMIN — HEPARIN SODIUM 1900 UNIT(S)/HR: 5000 INJECTION INTRAVENOUS; SUBCUTANEOUS at 20:20

## 2023-01-01 RX ADMIN — CHLORHEXIDINE GLUCONATE 1 APPLICATION(S): 213 SOLUTION TOPICAL at 20:16

## 2023-01-01 RX ADMIN — MUPIROCIN 1 APPLICATION(S): 20 OINTMENT TOPICAL at 17:02

## 2023-01-01 RX ADMIN — OXYCODONE HYDROCHLORIDE 10 MILLIGRAM(S): 5 TABLET ORAL at 19:06

## 2023-01-01 RX ADMIN — MUPIROCIN 1 APPLICATION(S): 20 OINTMENT TOPICAL at 19:41

## 2023-01-01 RX ADMIN — Medication 1 APPLICATION(S): at 12:58

## 2023-01-01 RX ADMIN — Medication 1625 MILLIGRAM(S): at 05:09

## 2023-01-01 RX ADMIN — CHLORHEXIDINE GLUCONATE 1 APPLICATION(S): 213 SOLUTION TOPICAL at 18:03

## 2023-01-01 RX ADMIN — OXYCODONE HYDROCHLORIDE 10 MILLIGRAM(S): 5 TABLET ORAL at 05:00

## 2023-01-01 RX ADMIN — CHLORHEXIDINE GLUCONATE 1 APPLICATION(S): 213 SOLUTION TOPICAL at 06:54

## 2023-01-01 RX ADMIN — PANTOPRAZOLE SODIUM 40 MILLIGRAM(S): 20 TABLET, DELAYED RELEASE ORAL at 06:47

## 2023-01-01 RX ADMIN — HEPARIN SODIUM 5000 UNIT(S): 5000 INJECTION INTRAVENOUS; SUBCUTANEOUS at 17:39

## 2023-01-01 RX ADMIN — Medication 3 MILLILITER(S): at 23:20

## 2023-01-01 RX ADMIN — Medication 1300 MILLIGRAM(S): at 22:47

## 2023-01-01 RX ADMIN — PANTOPRAZOLE SODIUM 40 MILLIGRAM(S): 20 TABLET, DELAYED RELEASE ORAL at 05:06

## 2023-01-01 RX ADMIN — PANTOPRAZOLE SODIUM 40 MILLIGRAM(S): 20 TABLET, DELAYED RELEASE ORAL at 05:43

## 2023-01-01 RX ADMIN — Medication 1625 MILLIGRAM(S): at 22:38

## 2023-01-01 RX ADMIN — DEXMEDETOMIDINE HYDROCHLORIDE IN 0.9% SODIUM CHLORIDE 8.22 MICROGRAM(S)/KG/HR: 4 INJECTION INTRAVENOUS at 07:51

## 2023-01-01 RX ADMIN — OXYCODONE HYDROCHLORIDE 10 MILLIGRAM(S): 5 TABLET ORAL at 17:21

## 2023-01-01 RX ADMIN — OXYCODONE HYDROCHLORIDE 10 MILLIGRAM(S): 5 TABLET ORAL at 02:43

## 2023-01-01 RX ADMIN — OXYCODONE HYDROCHLORIDE 10 MILLIGRAM(S): 5 TABLET ORAL at 07:31

## 2023-01-01 RX ADMIN — PANTOPRAZOLE SODIUM 40 MILLIGRAM(S): 20 TABLET, DELAYED RELEASE ORAL at 07:36

## 2023-01-01 RX ADMIN — HYDROMORPHONE HYDROCHLORIDE 0.2 MILLIGRAM(S): 2 INJECTION INTRAMUSCULAR; INTRAVENOUS; SUBCUTANEOUS at 21:42

## 2023-01-01 RX ADMIN — PANTOPRAZOLE SODIUM 40 MILLIGRAM(S): 20 TABLET, DELAYED RELEASE ORAL at 18:02

## 2023-01-01 RX ADMIN — HEPARIN SODIUM 5000 UNIT(S): 5000 INJECTION INTRAVENOUS; SUBCUTANEOUS at 06:44

## 2023-01-01 RX ADMIN — OXYCODONE HYDROCHLORIDE 7.5 MILLIGRAM(S): 5 TABLET ORAL at 14:19

## 2023-01-01 RX ADMIN — Medication 5 MILLIGRAM(S): at 21:39

## 2023-01-01 RX ADMIN — Medication 5 MILLIGRAM(S): at 05:59

## 2023-01-01 RX ADMIN — HEPARIN SODIUM 1900 UNIT(S)/HR: 5000 INJECTION INTRAVENOUS; SUBCUTANEOUS at 20:57

## 2023-01-01 RX ADMIN — OXYCODONE HYDROCHLORIDE 10 MILLIGRAM(S): 5 TABLET ORAL at 07:15

## 2023-01-01 RX ADMIN — OXYCODONE HYDROCHLORIDE 10 MILLIGRAM(S): 5 TABLET ORAL at 01:28

## 2023-01-01 RX ADMIN — OXYCODONE HYDROCHLORIDE 10 MILLIGRAM(S): 5 TABLET ORAL at 06:10

## 2023-01-01 RX ADMIN — HEPARIN SODIUM 16 UNIT(S)/HR: 5000 INJECTION INTRAVENOUS; SUBCUTANEOUS at 20:47

## 2023-01-01 RX ADMIN — APIXABAN 2.5 MILLIGRAM(S): 2.5 TABLET, FILM COATED ORAL at 05:03

## 2023-01-01 RX ADMIN — HEPARIN SODIUM 1900 UNIT(S)/HR: 5000 INJECTION INTRAVENOUS; SUBCUTANEOUS at 17:49

## 2023-01-01 RX ADMIN — Medication 1 CAPSULE(S): at 18:15

## 2023-01-01 RX ADMIN — OXYCODONE HYDROCHLORIDE 10 MILLIGRAM(S): 5 TABLET ORAL at 03:51

## 2023-01-01 RX ADMIN — Medication 30 MILLIGRAM(S): at 05:11

## 2023-01-01 RX ADMIN — Medication 100 MILLIEQUIVALENT(S): at 13:52

## 2023-01-01 RX ADMIN — Medication 1 CAPSULE(S): at 18:41

## 2023-01-01 RX ADMIN — OXYCODONE HYDROCHLORIDE 10 MILLIGRAM(S): 5 TABLET ORAL at 19:57

## 2023-01-01 RX ADMIN — Medication 30 MILLIGRAM(S): at 06:30

## 2023-01-01 RX ADMIN — CHLORHEXIDINE GLUCONATE 1 APPLICATION(S): 213 SOLUTION TOPICAL at 17:57

## 2023-01-01 RX ADMIN — Medication 1 GRAM(S): at 11:55

## 2023-01-01 RX ADMIN — HEPARIN SODIUM 5000 UNIT(S): 5000 INJECTION INTRAVENOUS; SUBCUTANEOUS at 23:03

## 2023-01-01 RX ADMIN — PANTOPRAZOLE SODIUM 40 MILLIGRAM(S): 20 TABLET, DELAYED RELEASE ORAL at 06:24

## 2023-01-01 RX ADMIN — MAGNESIUM OXIDE 400 MG ORAL TABLET 400 MILLIGRAM(S): 241.3 TABLET ORAL at 12:05

## 2023-01-01 RX ADMIN — OXYCODONE HYDROCHLORIDE 7.5 MILLIGRAM(S): 5 TABLET ORAL at 11:15

## 2023-01-01 RX ADMIN — CHLORHEXIDINE GLUCONATE 1 APPLICATION(S): 213 SOLUTION TOPICAL at 17:40

## 2023-01-01 RX ADMIN — Medication 1 APPLICATION(S): at 13:54

## 2023-01-01 RX ADMIN — OXYCODONE HYDROCHLORIDE 7.5 MILLIGRAM(S): 5 TABLET ORAL at 13:21

## 2023-01-01 RX ADMIN — MUPIROCIN 1 APPLICATION(S): 20 OINTMENT TOPICAL at 18:04

## 2023-01-01 RX ADMIN — OXYCODONE HYDROCHLORIDE 10 MILLIGRAM(S): 5 TABLET ORAL at 02:28

## 2023-01-01 RX ADMIN — OXYCODONE HYDROCHLORIDE 10 MILLIGRAM(S): 5 TABLET ORAL at 17:58

## 2023-01-01 RX ADMIN — Medication 5 MILLIGRAM(S): at 13:01

## 2023-01-01 RX ADMIN — CALCITRIOL 0.25 MICROGRAM(S): 0.5 CAPSULE ORAL at 13:02

## 2023-01-01 RX ADMIN — PANTOPRAZOLE SODIUM 40 MILLIGRAM(S): 20 TABLET, DELAYED RELEASE ORAL at 06:53

## 2023-01-01 RX ADMIN — HEPARIN SODIUM 5000 UNIT(S): 5000 INJECTION INTRAVENOUS; SUBCUTANEOUS at 05:09

## 2023-01-01 RX ADMIN — Medication 100 MILLIGRAM(S): at 12:31

## 2023-01-01 RX ADMIN — LIDOCAINE 1 PATCH: 4 CREAM TOPICAL at 20:15

## 2023-01-01 RX ADMIN — OXYCODONE HYDROCHLORIDE 10 MILLIGRAM(S): 5 TABLET ORAL at 16:39

## 2023-01-01 RX ADMIN — Medication 3 MILLILITER(S): at 17:16

## 2023-01-01 RX ADMIN — Medication 2 MILLIGRAM(S): at 18:06

## 2023-01-01 RX ADMIN — Medication 30 MILLIGRAM(S): at 05:28

## 2023-01-01 RX ADMIN — HEPARIN SODIUM 5000 UNIT(S): 5000 INJECTION INTRAVENOUS; SUBCUTANEOUS at 06:07

## 2023-01-01 RX ADMIN — OXYCODONE HYDROCHLORIDE 10 MILLIGRAM(S): 5 TABLET ORAL at 23:42

## 2023-01-01 RX ADMIN — PANTOPRAZOLE SODIUM 40 MILLIGRAM(S): 20 TABLET, DELAYED RELEASE ORAL at 05:11

## 2023-01-01 RX ADMIN — CHLORHEXIDINE GLUCONATE 1 APPLICATION(S): 213 SOLUTION TOPICAL at 18:22

## 2023-01-01 RX ADMIN — Medication 25 GRAM(S): at 17:18

## 2023-01-01 RX ADMIN — Medication 1 CAPSULE(S): at 12:35

## 2023-01-01 RX ADMIN — OXYCODONE HYDROCHLORIDE 7.5 MILLIGRAM(S): 5 TABLET ORAL at 07:52

## 2023-01-01 RX ADMIN — PANTOPRAZOLE SODIUM 40 MILLIGRAM(S): 20 TABLET, DELAYED RELEASE ORAL at 18:16

## 2023-01-01 RX ADMIN — Medication 100 MILLIGRAM(S): at 13:03

## 2023-01-01 RX ADMIN — Medication 1 APPLICATION(S): at 18:00

## 2023-01-01 RX ADMIN — Medication 1 CAPSULE(S): at 18:23

## 2023-01-01 RX ADMIN — OXYCODONE HYDROCHLORIDE 10 MILLIGRAM(S): 5 TABLET ORAL at 15:50

## 2023-01-01 NOTE — PROGRESS NOTE ADULT - ASSESSMENT
62 year old male with Hx of esophageal stricture (s/p stent on 5/2022 by Dr. Hernandez?), Emphysema, HCV, chronic pancreatitis, presenting from [facility] for esophageal stent removal due to intractable n/v. Admitted to [facility] from 11/25-12/14 for ams 2/2 Septic shock due to psuedomonal PNA, Lung abscess, morganella bacteremia and kelbsiella UTI c/b ATN requiring HD and intractable n/v.

## 2023-01-01 NOTE — PROGRESS NOTE ADULT - SUBJECTIVE AND OBJECTIVE BOX
Hospitalist Progress Note  Yessy Montelongo MD Pager # [phone]    OVERNIGHT EVENTS: YOSSI    SUBJECTIVE / INTERVAL HPI: Patient seen and examined at bedside. Patient reports that he has joint pain today. He does not want to take his oral medications. He still has back and chest pain.     VITAL SIGNS:  Vital Signs Last 24 Hrs  T(C): 36.4 (01 Jan 2023 09:30), Max: 37 (31 Dec 2022 21:50)  T(F): 97.5 (01 Jan 2023 09:30), Max: 98.6 (31 Dec 2022 21:50)  HR: 90 (01 Jan 2023 09:30) (84 - 91)  BP: 155/77 (01 Jan 2023 09:30) (148/68 - 155/77)  BP(mean): --  RR: 18 (01 Jan 2023 09:30) (18 - 18)  SpO2: 100% (01 Jan 2023 09:30) (100% - 100%)    Parameters below as of 01 Jan 2023 09:30  Patient On (Oxygen Delivery Method): room air        PHYSICAL EXAM:    General: WDWN  HEENT: NC/AT; PERRL, anicteric sclera; MMM  Neck: supple  Cardiovascular: +S1/S2; RRR  Respiratory: CTA B/L; no W/R/R  Gastrointestinal: soft, NT/ND; +BSx4  Extremities: WWP; no edema, clubbing or cyanosis  Vascular: 2+ radial, DP/PT pulses B/L  Neurological: AAOx3; no focal deficits    MEDICATIONS:  MEDICATIONS  (STANDING):  calcitriol   Capsule 0.25 MICROGram(s) Oral daily  folic acid 1 milliGRAM(s) Oral daily  heparin   Injectable 5000 Unit(s) SubCutaneous every 8 hours  NIFEdipine XL 30 milliGRAM(s) Oral daily  nystatin    Suspension 349943 Unit(s) Oral three times a day  pancrelipase  (CREON  6,000 Lipase Units) 1 Capsule(s) Oral three times a day with meals  pantoprazole  Injectable 40 milliGRAM(s) IV Push two times a day  piperacillin/tazobactam IVPB.. 3.375 Gram(s) IV Intermittent every 12 hours  sevelamer carbonate 800 milliGRAM(s) Oral three times a day  sucralfate suspension 1 Gram(s) Oral every 6 hours  thiamine 100 milliGRAM(s) Oral daily    MEDICATIONS  (PRN):  acetaminophen     Tablet .. 650 milliGRAM(s) Oral every 6 hours PRN Mild Pain (1 - 3), Moderate Pain (4 - 6)  cyclobenzaprine 5 milliGRAM(s) Oral three times a day PRN Muscle Spasm  ondansetron Injectable 4 milliGRAM(s) IV Push every 8 hours PRN Nausea and/or Vomiting  oxyCODONE    IR 7.5 milliGRAM(s) Oral every 4 hours PRN mod-severe pain      ALLERGIES:  Allergies    Tylenol (Unknown)    Intolerances        LABS:              CAPILLARY BLOOD GLUCOSE          RADIOLOGY & ADDITIONAL TESTS: Reviewed.    ASSESSMENT:    PLAN:

## 2023-01-01 NOTE — PROGRESS NOTE ADULT - PROBLEM SELECTOR PLAN 4
chronic pancreatitis  previously on Creon, will resume given likely exocrine insufficiency with malabsorption given BMI of 17. Pt refusing creon. Has diarrhea.   c/w home oxycodone 15mg TID, confirmed in iSTOP  monitor

## 2023-01-01 NOTE — PROGRESS NOTE ADULT - PROBLEM SELECTOR PLAN 6
S/p HD X 3  in ICU in [facility]   Cr plateaued at 5.7-6, off HD now  Avoid nephrotoxic, no contrast , no NSAID  VA renal duplex showing severe stenosis of the left renal artery   vascular consulted, no plan for intervention for HESHAM given normal blood pressures  Renally dose medications   nephro following  - Plan for AVF creation on this admission. Requires cardiology clearance prior to the OR. NST with wall motion abnormality. Now pending cardiac cath. Cancelled on Friday due to multiple episodes of diarrhea.   - Risk stratification: RCRI 1 - class II risk - 6% 30 day risk of death, MI or cardiac arrest. Pending NST prior to low risk procedure. Cardiology following.     # Anemia of chronic disease  s/p 1u prbc in [facility] and s/p Retacrit IV Fe    #Hypocalcemia  - inonidez calcium 0.96, slightly low  - Corrected calcium is normal   - started on calcitriol, intermittently refusing   - Pt. started on phoslo but refusing all oral meds which is likely why phos is high  - Continue to monitor

## 2023-01-01 NOTE — PROVIDER CONTACT NOTE (FALL NOTIFICATION) - ACTION/TREATMENT ORDERED:
Provider notified. Continue to monitor. Will come and assess patient. Head CT and pelvis xray ordered.

## 2023-01-01 NOTE — PROGRESS NOTE ADULT - SUBJECTIVE AND OBJECTIVE BOX
DATE OF SERVICE: 01-01-23 @ 22:24    Patient is a 62y old  Male who presents with a chief complaint of intractable N/V , esophageal stent removal (01 Jan 2023 15:24)      INTERVAL HISTORY: feels ok, s/p fall    TELEMETRY Personally reviewed:  	  MEDICATIONS:  NIFEdipine XL 30 milliGRAM(s) Oral daily        PHYSICAL EXAM:  T(C): 36.7 (01-01-23 @ 17:36), Max: 36.8 (01-01-23 @ 06:00)  HR: 85 (01-01-23 @ 17:36) (85 - 90)  BP: 136/69 (01-01-23 @ 17:36) (136/69 - 155/77)  RR: 18 (01-01-23 @ 17:36) (18 - 18)  SpO2: 100% (01-01-23 @ 17:36) (100% - 100%)  Wt(kg): --  I&O's Summary        Appearance: In no distress	  HEENT:    PERRL, EOMI	  Cardiovascular:  S1 S2, No JVD  Respiratory: Lungs clear to auscultation	  Gastrointestinal:  Soft, Non-tender, + BS	  Vascularature:  No edema of LE  Psychiatric: Appropriate affect   Neuro: no acute focal deficits           Labs personally reviewed      Assessment and Plan:   · Assessment	  62 year old male with Hx of esophageal stricture (s/p stent on 5/2022 by Dr. Hernandez?), Emphysema, HCV, chronic pancreatitis, presenting from [facility] for esophageal stent removal due to intractable n/v. Admitted to [facility] from 11/25-12/14 for ams 2/2 Septic shock due to psuedomonal PNA, Lung abscess, morganella bacteremia and kelbsiella UTI c/b ATN requiring HD and intractable n/v.      Problem/Plan - 1:  ·  Problem: Preop Risk Stratification  ·  Plan: - trops elevated on admission 210->199.  - EKG with T wave flattening in inferolateral leads.   - TTE showing new mild global LV dysfunction   - NM stress test with inferior wall ischemia  - r/b of cath discussed with pt, agreeable to proceed. Taken down to cath lab 12/30 but sent up as pt with active diarrhea  - will try again on Tuesday    Problem/Plan - 2:  ·  Problem: HTN (hypertension).   ·  Plan: BP slightly elevated, although intermittently refusing BP meds   renal dopplers show left sided HESHAM 60-99%, vascular not recommending intervention   continue with Nifedipine 30mg QD.    Problem/Plan - 3:  ·  Problem: Need for prophylactic measure.   ·  Plan: DVT ppx: Heparin subq   Diet: Regular diet, monitor to see if tolerating             Talib Davison DO [facility]  Cardiovascular Medicine  [address], [address]  Office: [phone]  Available via Text/call on [employer] Teams

## 2023-01-01 NOTE — PROGRESS NOTE ADULT - PROBLEM SELECTOR PLAN 1
Hx of esophageal stricture (s/p stent on 5/2022 by Dr. Hernandez?)   intractable N/V, transferred to [facility] for esophageal stent removal by Dr. Erickson - performed showing severe esophagitis  - GI Consulted now s/p stent removal   - c/w PPI and Carafate - patient refusing   - c/w soft diet     # Epigastric pain   - likely secondary to above  - trops elevated on admission 210->199. Were significantly elevated at OSH   - EKG with T wave flattening in inferolateral leads, consistent with prior study.   - TTE showing new mild global LV dysfunction   - NST positive with inferior wall motion abnormality. Pending cath but did not perform given multiple episodes of diarrhea. Will reattempt 1/3.

## 2023-01-01 NOTE — CHART NOTE - NSCHARTNOTEFT_GEN_A_CORE
RN called to report that patient was found on the floor. Pt states that he was going to the garbage, and slipped and fell on his buttock, also mentioned that he hit his head on the wooden table.   Patient was seen and examined at bedside. He denies any headache, blurry vision, pain in hip/leg or spine.   He keeps asking for pain killer for chronic abdominal pain, otherwise no new complaints    Patient is alert and oriented x3, no signs of head/facial trauma. GCS 15, no focal deficits  Non tender to palpation in spine/hip/knee and legs, elbows.   No obvious deformities, swelling noted    Plan  - PSR in room  - CT Head, and Xray Hip pending  - Will continue to monitor for any signs of mental status change

## 2023-01-01 NOTE — PROVIDER CONTACT NOTE (FALL NOTIFICATION) - SITUATION
Pt found on floor by another nurse. Pt stated he fell on his bottom and hit his head on the table going down.

## 2023-01-02 NOTE — PROGRESS NOTE ADULT - SUBJECTIVE AND OBJECTIVE BOX
[facility] DIVISION OF KIDNEY DISEASES AND HYPERTENSION -- FOLLOW UP NOTE  --------------------------------------------------------------------------------  HPI: Pt is a 62-year-old Male with Hx of esophageal stricture (S/p stenting in 5/22), Emphysema, HCV, chronic pancreatitis who initially presented to [facility] on 11/25/22 for AMS. Pt was admitted to [facility] on 11/25 for AMS 2/2 Septic shock. Pt was initially intubated for acute respiratory failure and required pressors for hypotension. Pt's hospital course was complicated by YUNIEL/ATN requiring HD. Pt was extubated and was transferred to [facility] for esophageal stent removal by Dr. Erickson. Initial labs on this admission concerning for elevated Scr. Nephrology team following for YUNIEL on CKD.     On review of [facility], it was noted that Scr was elevated at 2.20 in September 2022. Scr on admission was significantly elevated at 8.94 on 11/25/22. Pt received 1st HD session on 11/26/22. Last HD session was done on 12/1/22. HD catheter was removed as kidney function was recovering. Scr was elevated at 5.28 on transfer from [facility] on 12/15/22. Scr remains elevated/stable at 5-6 range.      Pt. seen and examined this morning. Overnight noted to have a fall. This morning reported feeling okay, without any chest pain, shortness of breath, nausea, or vomiting. He states his appetite is well.       PAST HISTORY  --------------------------------------------------------------------------------  No significant changes to PMH, PSH, FHx, SHx, unless otherwise noted    ALLERGIES & MEDICATIONS  --------------------------------------------------------------------------------  Allergies    Tylenol (Unknown)    Intolerances      Standing Inpatient Medications  calcitriol   Capsule 0.25 MICROGram(s) Oral daily  folic acid 1 milliGRAM(s) Oral daily  heparin   Injectable 5000 Unit(s) SubCutaneous every 8 hours  NIFEdipine XL 30 milliGRAM(s) Oral daily  nystatin    Suspension 824052 Unit(s) Oral three times a day  pancrelipase  (CREON  6,000 Lipase Units) 1 Capsule(s) Oral three times a day with meals  pantoprazole  Injectable 40 milliGRAM(s) IV Push two times a day  piperacillin/tazobactam IVPB.. 3.375 Gram(s) IV Intermittent every 12 hours  sevelamer carbonate 800 milliGRAM(s) Oral three times a day  sucralfate suspension 1 Gram(s) Oral every 6 hours  thiamine 100 milliGRAM(s) Oral daily    PRN Inpatient Medications  acetaminophen     Tablet .. 650 milliGRAM(s) Oral every 6 hours PRN  cyclobenzaprine 5 milliGRAM(s) Oral three times a day PRN  ondansetron Injectable 4 milliGRAM(s) IV Push every 8 hours PRN  oxyCODONE    IR 7.5 milliGRAM(s) Oral every 4 hours PRN      REVIEW OF SYSTEMS  All other systems were reviewed and are negative, except as noted.    VITALS/PHYSICAL EXAM  --------------------------------------------------------------------------------  T(C): 36.6 (01-02-23 @ 09:39), Max: 36.9 (01-02-23 @ 05:32)  HR: 83 (01-02-23 @ 09:39) (83 - 94)  BP: 151/79 (01-02-23 @ 09:39) (136/69 - 153/86)  RR: 18 (01-02-23 @ 09:39) (17 - 18)  SpO2: 100% (01-02-23 @ 09:39) (100% - 100%)  Wt(kg): --      Physical Exam:  	Gen: ill appearing, frail  	HEENT: dry   	Pulm: CTA today  	CV: S1S2  	Abd: Soft, +BS   	Ext: No LE edema B/L  	Neuro: Awake and alert   	Skin: Warm and dry      LABS/STUDIES  --------------------------------------------------------------------------------        Creatinine Trend:  SCr 5.81 [12-30 @ 05:15]  SCr 5.94 [12-25 @ 06:34]  SCr 5.70 [12-23 @ 11:55]  SCr 5.86 [12-22 @ 06:40]  SCr 5.57 [12-21 @ 08:04]    Urinalysis - [12-18-22 @ 00:40]      Color Light Yellow / Appearance Clear / SG 1.013 / pH 6.0      Gluc Negative / Ketone Negative  / Bili Negative / Urobili <2 mg/dL       Blood Trace / Protein 30 mg/dL / Leuk Est Negative / Nitrite Negative      RBC 2 / WBC 1 / Hyaline  / Gran  / Sq Epi  / Non Sq Epi 1 / Bacteria Negative      Iron 61, TIBC --, %sat --      [12-08-22 @ 08:25]  Ferritin 264      [12-08-22 @ 08:25]  PTH -- (Ca --)      [12-22-22 @ 06:40]   290  HbA1c 5.0      [03-22-19 @ 09:03]  TSH 1.380      [09-01-22 @ 08:58]  Lipid: chol 147, TG 88, HDL 59, LDL --      [09-01-22 @ 08:58]      Free Light Chains: kappa 13.31, lambda 12.94, ratio = 1.03      [12-07 @ 05:35]  Immunofixation Serum: No Monoclonal Band Identified    Reference Range: None Detected      [12-07-22 @ 05:35]  SPEP Interpretation: Hypoalbuminemia      [12-07-22 @ 05:35]

## 2023-01-02 NOTE — PROGRESS NOTE ADULT - PROBLEM SELECTOR PLAN 6
S/p HD X 3  in ICU in [facility]   Cr plateaued at 5.7-6, off HD now  Avoid nephrotoxic, no contrast , no NSAID  VA renal duplex showing severe stenosis of the left renal artery   vascular consulted, no plan for intervention for HESHAM given normal blood pressures  Renally dose medications   nephro following  - Plan for AVF creation on this admission. Requires cardiology clearance prior to the OR. NST with wall motion abnormality. Now pending cardiac cath. Cancelled on Friday due to multiple episodes of diarrhea.   - Risk stratification: RCRI 1 - class II risk - 6% 30 day risk of death, MI or cardiac arrest. Pending NST prior to low risk procedure. Cardiology following.     # Anemia of chronic disease  s/p 1u prbc in [facility] and s/p Retacrit IV Fe    #Hypocalcemia  - inonidez calcium 0.96, slightly low  - Corrected calcium is normal   - started on calcitriol, intermittently refusing   - Pt. started on phoslo but refusing all oral meds which is likely why phos is high  - Continue to monitor

## 2023-01-02 NOTE — PROGRESS NOTE ADULT - PROBLEM SELECTOR PLAN 2
Multiple episodes of diarrhea on antibiotics. Also with history of chronic pancreatitis which may be contributing. On Creon but refusing which is likely why he is having diarrhea. Pt. continues to refuse Creon despite counseling.   - D/c bowel regimen    #Fall, patient fell when legs gave out on Sunday. Hit his head. Mental status stable. Pt. reports legs gave out. No dizziness/lighteadedness.   - CTH ordered. WNL.

## 2023-01-02 NOTE — PROGRESS NOTE ADULT - PROBLEM SELECTOR PLAN 2
Secondary hyper parathyroidism.  ionized calcium was low and was started on calcitriol 0.25mcg daily. Phos levels were above goal on 12/30 with phos of 7.8, now on Sevelamer 800mg TID with meals. Monitor phos levels. Secondary hyperparathyroidism.  ionized calcium was low and was started on calcitriol 0.25mcg daily. Phos levels were above goal on 12/30 with phos of 7.8, now on Sevelamer 800mg TID with meals. Monitor phos levels.

## 2023-01-02 NOTE — PROGRESS NOTE ADULT - SUBJECTIVE AND OBJECTIVE BOX
DATE OF SERVICE: 01-02-23 @ 21:53    Patient is a 62y old  Male who presents with a chief complaint of intractable N/V , esophageal stent removal (02 Jan 2023 15:08)      INTERVAL HISTORY: feels ok     	  MEDICATIONS:  NIFEdipine XL 30 milliGRAM(s) Oral daily        PHYSICAL EXAM:  T(C): 36.7 (01-02-23 @ 19:00), Max: 36.9 (01-02-23 @ 05:32)  HR: 91 (01-02-23 @ 19:00) (83 - 94)  BP: 145/73 (01-02-23 @ 19:00) (144/73 - 153/86)  RR: 17 (01-02-23 @ 19:00) (17 - 18)  SpO2: 100% (01-02-23 @ 19:00) (100% - 100%)  Wt(kg): --  I&O's Summary        Appearance: In no distress	  HEENT:    PERRL, EOMI	  Cardiovascular:  S1 S2, No JVD  Respiratory: Lungs clear to auscultation	  Gastrointestinal:  Soft, Non-tender, + BS	  Vascularature:  No edema of LE  Psychiatric: Appropriate affect   Neuro: no acute focal deficits              Labs personally reviewed      Assessment and Plan:   · Assessment	  62 year old male with Hx of esophageal stricture (s/p stent on 5/2022 by Dr. Hernandez?), Emphysema, HCV, chronic pancreatitis, presenting from [facility] for esophageal stent removal due to intractable n/v. Admitted to [facility] from 11/25-12/14 for ams 2/2 Septic shock due to psuedomonal PNA, Lung abscess, morganella bacteremia and kelbsiella UTI c/b ATN requiring HD and intractable n/v.      Problem/Plan - 1:  ·  Problem: Preop Risk Stratification  ·  Plan: - trops elevated on admission 210->199.  - EKG with T wave flattening in inferolateral leads.   - TTE showing new mild global LV dysfunction   - NM stress test with inferior wall ischemia  - r/b of cath discussed with pt, agreeable to proceed. Taken down to cath lab 12/30 but sent up as pt with active diarrhea  - will try again on Tuesday    Problem/Plan - 2:  ·  Problem: HTN (hypertension).   ·  Plan: BP slightly elevated, although intermittently refusing BP meds   renal dopplers show left sided HESHAM 60-99%, vascular not recommending intervention   continue with Nifedipine 30mg QD.    Problem/Plan - 3:  ·  Problem: Need for prophylactic measure.   ·  Plan: DVT ppx: Heparin subq   Diet: Regular diet, monitor to see if tolerating               Talib Davison DO [facility]  Cardiovascular Medicine  [address], [address]  Office: [phone]  Available via Text/call on Microsoft Teams

## 2023-01-02 NOTE — PROGRESS NOTE ADULT - ASSESSMENT
62 year old male with Hx of esophageal stricture (s/p stent on 5/2022 by Dr. Hernandez?), Emphysema, HCV, chronic pancreatitis, presenting from [facility] for esophageal stent removal due to intractable n/v. Admitted to [facility] from 11/25-12/14 for ams 2/2 Septic shock due to psuedomonal PNA, Lung abscess, morganella bacteremia and kelbsiella UTI c/b ATN requiring HD and intractable n/v. Pending AVF however pending cardiac clearance - pending cardiac cath.

## 2023-01-02 NOTE — PROGRESS NOTE ADULT - SUBJECTIVE AND OBJECTIVE BOX
Hospitalist Progress Note  Yessy Montelongo MD Pager # [phone]    OVERNIGHT EVENTS: YOSSI    SUBJECTIVE / INTERVAL HPI: Patient seen and examined at bedside. Patient reports he fell yesterday and hit the right side of his head on the table. He said his legs buckled. He was trying to get more food. He continues to have back pain and chest pain.     VITAL SIGNS:  Vital Signs Last 24 Hrs  T(C): 36.7 (02 Jan 2023 14:08), Max: 36.9 (02 Jan 2023 05:32)  T(F): 98 (02 Jan 2023 14:08), Max: 98.5 (02 Jan 2023 05:32)  HR: 90 (02 Jan 2023 14:08) (83 - 94)  BP: 149/85 (02 Jan 2023 14:08) (136/69 - 153/86)  BP(mean): --  RR: 17 (02 Jan 2023 14:08) (17 - 18)  SpO2: 100% (02 Jan 2023 14:08) (100% - 100%)    Parameters below as of 02 Jan 2023 14:08  Patient On (Oxygen Delivery Method): room air        PHYSICAL EXAM:    General: Thin appearing male resting in bed   HEENT: NC/AT; PERRL, anicteric sclera; MMM - bruising noted   Neck: supple  Cardiovascular: +S1/S2; RRR  Respiratory: CTA B/L; no W/R/R  Gastrointestinal: soft, NT/ND; +BSx4  Extremities: WWP; no edema, clubbing or cyanosis  Vascular: 2+ radial, DP/PT pulses B/L  Neurological: AAOx3; no focal deficits    MEDICATIONS:  MEDICATIONS  (STANDING):  calcitriol   Capsule 0.25 MICROGram(s) Oral daily  folic acid 1 milliGRAM(s) Oral daily  heparin   Injectable 5000 Unit(s) SubCutaneous every 8 hours  NIFEdipine XL 30 milliGRAM(s) Oral daily  nystatin    Suspension 924881 Unit(s) Oral three times a day  pancrelipase  (CREON  6,000 Lipase Units) 1 Capsule(s) Oral three times a day with meals  pantoprazole  Injectable 40 milliGRAM(s) IV Push two times a day  piperacillin/tazobactam IVPB.. 3.375 Gram(s) IV Intermittent every 12 hours  sevelamer carbonate 800 milliGRAM(s) Oral three times a day  sucralfate suspension 1 Gram(s) Oral every 6 hours  thiamine 100 milliGRAM(s) Oral daily    MEDICATIONS  (PRN):  acetaminophen     Tablet .. 650 milliGRAM(s) Oral every 6 hours PRN Mild Pain (1 - 3), Moderate Pain (4 - 6)  cyclobenzaprine 5 milliGRAM(s) Oral three times a day PRN Muscle Spasm  ondansetron Injectable 4 milliGRAM(s) IV Push every 8 hours PRN Nausea and/or Vomiting  oxyCODONE    IR 7.5 milliGRAM(s) Oral every 4 hours PRN mod-severe pain      ALLERGIES:  Allergies    Tylenol (Unknown)    Intolerances        LABS:              CAPILLARY BLOOD GLUCOSE          RADIOLOGY & ADDITIONAL TESTS: Reviewed.    ASSESSMENT:    PLAN:

## 2023-01-02 NOTE — PROGRESS NOTE ADULT - PROBLEM SELECTOR PLAN 3
Pt. with anemia suspected in setting of renal failure. Please check iron studies and ferritin levels. Hb below goal. Monitor Hb

## 2023-01-02 NOTE — PROGRESS NOTE ADULT - ATTENDING COMMENTS
Patient seen and evaluated not uremic on exam still very hungry.  patient will need AVF but is pending cath for optimization.  please check labs.  fluids as above for HAYDE ppx when patient goes for cath.  patient without any clinical evidence of hypervolemia CTA b/l lying flat dry skin.

## 2023-01-02 NOTE — PROGRESS NOTE ADULT - PROBLEM SELECTOR PLAN 1
Hx of esophageal stricture (s/p stent on 5/2022 by Dr. Hernandez?)   intractable N/V, transferred to [facility] for esophageal stent removal by Dr. Erickson - performed showing severe esophagitis  - GI Consulted now s/p stent removal   - c/w PPI and Carafate - patient refusing   - c/w soft diet     # Epigastric pain   - likely secondary to above  - trops elevated on admission 210->199. Were significantly elevated at OSH   - EKG with T wave flattening in inferolateral leads, consistent with prior study.   - TTE showing new mild global LV dysfunction   - NST positive with inferior wall motion abnormality. Pending cath but did not perform given multiple episodes of diarrhea. Will reattempt 1/3.

## 2023-01-02 NOTE — PROGRESS NOTE ADULT - SUBJECTIVE AND OBJECTIVE BOX
Surgery Progress Note    Subjective:     Patient seen and examined at bedside. VSS, AF. Patient with slip and fall o/n, CTH pending. For cath today     OBJECTIVE:     T(C): 36.9 (01-02-23 @ 05:32), Max: 36.9 (01-02-23 @ 05:32)  HR: 93 (01-02-23 @ 05:32) (85 - 94)  BP: 153/86 (01-02-23 @ 05:32) (136/69 - 155/77)  RR: 18 (01-02-23 @ 05:32) (17 - 18)  SpO2: 100% (01-02-23 @ 05:32) (100% - 100%)  Wt(kg): --    I&O's Detail      PHYSICAL EXAM:    Neuro: A+Ox3  HEENT: NC/AT, EOMI  Neck: Soft, supple  Cardio: RRR  Resp: Good effort, CTA b/l  Thorax: No chest wall tenderness  Breast: No lesions/masses, no drainage  GI/Abd: Soft, NT/ND, no rebound/guarding, no masses palpated  Vascular: All 4 extremities warm. b/l UE with palpable strong radial and ulnar pulse.   Skin: Intact, no breakdown  Lymphatic/Nodes: No palpable lymphadenopathy  Musculoskeletal: All 4 extremities moving spontaneously, no limitations    MEDICATIONS  (STANDING):  calcitriol   Capsule 0.25 MICROGram(s) Oral daily  folic acid 1 milliGRAM(s) Oral daily  heparin   Injectable 5000 Unit(s) SubCutaneous every 8 hours  NIFEdipine XL 30 milliGRAM(s) Oral daily  nystatin    Suspension 446108 Unit(s) Oral three times a day  pancrelipase  (CREON  6,000 Lipase Units) 1 Capsule(s) Oral three times a day with meals  pantoprazole  Injectable 40 milliGRAM(s) IV Push two times a day  piperacillin/tazobactam IVPB.. 3.375 Gram(s) IV Intermittent every 12 hours  sevelamer carbonate 800 milliGRAM(s) Oral three times a day  sucralfate suspension 1 Gram(s) Oral every 6 hours  thiamine 100 milliGRAM(s) Oral daily    MEDICATIONS  (PRN):  acetaminophen     Tablet .. 650 milliGRAM(s) Oral every 6 hours PRN Mild Pain (1 - 3), Moderate Pain (4 - 6)  cyclobenzaprine 5 milliGRAM(s) Oral three times a day PRN Muscle Spasm  ondansetron Injectable 4 milliGRAM(s) IV Push every 8 hours PRN Nausea and/or Vomiting  oxyCODONE    IR 7.5 milliGRAM(s) Oral every 4 hours PRN mod-severe pain      LABS:

## 2023-01-02 NOTE — PROGRESS NOTE ADULT - PROBLEM SELECTOR PLAN 1
Pt with YUNIEL on CKD likely multifactorial in the setting of recent COVID infection, poor oral intake. Scr was elevated at 2.20 in September 2022. Pt was recently admitted at [facility]. Scr on admission was significantly elevated at 8.94 on 11/25/22. Pt received 1st HD session on 11/26/22. Last HD session was done on 12/1/22. HD catheter was removed as kidney function was recovering. Scr was elevated at 5.28 on transfer from [facility] on 12/15/22. Scr remains elevated/stable at 5. No urgent indication for HD today.  Of note kidney sonogram had discrepant size renal artery duplex showing HESHAM but BP well controlled. Discussed with Dr. Dukes (12/21) who knows the patient very well and has seen him in patient and outpatient over the last several years.  As per Dr. Hanks the patient's baseline creatinine is 3 and has advanced CKD and is nearing ESKD. Patient is pending cardiac evaluation for clearance for AVF. I explained to the patient the risk of worsening kidney after catheterization.  He understands that we will continue to monitor closely for dialysis indications.  Recommend NS 60 cc / hr for 6 hours before and after the cardiac catheterization. Pt with YUNIEL on CKD likely multifactorial in the setting of recent COVID infection, poor oral intake. Scr was elevated at 2.20 in September 2022. Pt was recently admitted at [facility]. Scr on admission was significantly elevated at 8.94 on 11/25/22. Pt received 1st HD session on 11/26/22. Last HD session was done on 12/1/22. HD catheter was removed as kidney function was recovering. Scr was elevated at 5.28 on transfer from [facility] on 12/15/22. Scr remains elevated/stable at 5. No urgent indication for HD today.  Of note kidney sonogram had discrepant size renal artery duplex showing HESHAM but BP well controlled. Discussed with Dr. Dukes (12/21) who knows the patient very well and has seen him inpatient and outpatient over the last several years.  As per Dr. Hanks the patient's baseline creatinine is 3 and has advanced CKD and is nearing ESKD. Patient is pending cardiac evaluation for clearance for AVF. I explained to the patient the risk of worsening kidney function after catheterization.  He understands that we will continue to monitor closely for dialysis indications.  Recommend NS 60 cc / hr for 6 hours before and after the cardiac catheterization.  Recommend repeat labs today

## 2023-01-02 NOTE — PROGRESS NOTE ADULT - ASSESSMENT
62 M with YUNIEL on advanced CKD nearing ESRD, intermittent HD with HD catheter in the past 2 years.   Vascular surgery is consulted for AVF planning.     Plan:     - AVF creation deferred  - For cath today  - Recommend ID consult  - F/u cath results  - F/u bilateral upper extremity vein mapping  - Protect LUE(non-dominant):  No BPs, IVs, blood draws  - Please document medical/cardiology clearance for AVF  - no surgical intervention for L kidney artery stenosis given controlled BP  - Care per primary team     Vascular Surgery  #[id number] 62 M with YUNIEL on advanced CKD nearing ESRD, intermittent HD with HD catheter in the past 2 years.   Vascular surgery is consulted for AVF planning.     Plan:     - AVF creation deferred  - For cath tmr  - Recommend ID consult  - F/u cath results  - F/u bilateral upper extremity vein mapping  - Protect LUE(non-dominant):  No BPs, IVs, blood draws  - Please document medical/cardiology clearance for AVF  - no surgical intervention for L kidney artery stenosis given controlled BP  - Care per primary team     Vascular Surgery  #[id number]

## 2023-01-02 NOTE — PROGRESS NOTE ADULT - PROBLEM SELECTOR PLAN 4
Pt. with acidosis in setting of renal failure. Serum CO2 low at 16 today. Recommend starting on PO sodium bicarbonate tablets 650mg BID.    If any questions, please feel free to contact me     Marco Antonio Choi  Nephrology Fellow  [facility] Pager: [phone]

## 2023-01-03 NOTE — PROGRESS NOTE ADULT - PROBLEM SELECTOR PLAN 2
Secondary hyperparathyroidism.  ionized calcium was low and was started on calcitriol 0.25mcg daily. Phos levels above goal today. Continue with  Sevelamer 800mg TID with meals. Monitor phos levels.

## 2023-01-03 NOTE — PROGRESS NOTE ADULT - PROBLEM SELECTOR PLAN 10
BP slightly elevated, intermittently refusing BP meds   - renal dopplers show left sided HESHAM 60-99%, vascular not recommending intervention   - c/w Nifedipine 30mg QD, refuses most days

## 2023-01-03 NOTE — PROGRESS NOTE ADULT - ATTENDING COMMENTS
advanced CKD   hypernatremia  pending cardiac cath . IVF recommendation as outlined above    Assessment and plan as outlined above. Monitor labs and urine output. Avoid any potential nephrotoxins. Dose medications as per eGFR.  Further detailed plan of management and recommendation as outlined above by the renal fellow.

## 2023-01-03 NOTE — PROGRESS NOTE ADULT - PROBLEM SELECTOR PLAN 5
Septic shock due to pseudomonal PNA with lung abscess, morganella bacteremia and kelbsiella UTI.  Was on Zosyn since 11/25, as per ID in [facility] who recommended 4 week course of antibiotics versus possibly longer pending imaging at 4 weeks (12/23)  - CXR 12/23 - Stable RUL lesion, with new RLL pneumonia   - ID recommending to continue antibiotics for at least 2 more weeks (through 1/6) to c/w zosyn while inpatient, per ID can transition to PO Ceftin when ready for discharge; will f/u re: completion of abx re: need for repeat imaging   - SLP ->Soft and Bite Sized Solids with Thin Liquids

## 2023-01-03 NOTE — PROGRESS NOTE ADULT - SUBJECTIVE AND OBJECTIVE BOX
[facility] DIVISION OF KIDNEY DISEASES AND HYPERTENSION -- FOLLOW UP NOTE  --------------------------------------------------------------------------------  HPI: Pt is a 62-year-old Male with Hx of esophageal stricture (S/p stenting in 5/22), Emphysema, HCV, chronic pancreatitis who initially presented to [facility] on 11/25/22 for AMS. Pt was admitted to [facility] on 11/25 for AMS 2/2 Septic shock. Pt was initially intubated for acute respiratory failure and required pressors for hypotension. Pt's hospital course was complicated by YUNIEL/ATN requiring HD. Pt was extubated and was transferred to [facility] for esophageal stent removal by Dr. Erickson. Initial labs on this admission concerning for elevated Scr. Nephrology team following for YUNIEL on CKD.     On review of [facility], it was noted that Scr was elevated at 2.20 in September 2022. Scr on admission was significantly elevated at 8.94 on 11/25/22. Pt received 1st HD session on 11/26/22. Last HD session was done on 12/1/22. HD catheter was removed as kidney function was recovering. Scr was elevated at 5.28 on transfer from [facility] on 12/15/22. Scr remains elevated/stable at 5-6 range. Scr is elevated/stable at 5.84 today.     Pt. seen and examined this morning. This morning reported feeling okay, without any chest pain, shortness of breath, nausea, or vomiting. He states his appetite is well. Also reports that diarrhea has resolved.     PAST HISTORY  --------------------------------------------------------------------------------  No significant changes to PMH, PSH, FHx, SHx, unless otherwise noted    ALLERGIES & MEDICATIONS  --------------------------------------------------------------------------------  Allergies    Tylenol (Unknown)    Intolerances    Standing Inpatient Medications  calcitriol   Capsule 0.25 MICROGram(s) Oral daily  folic acid 1 milliGRAM(s) Oral daily  heparin   Injectable 5000 Unit(s) SubCutaneous every 8 hours  magnesium sulfate  IVPB 1 Gram(s) IV Intermittent once  NIFEdipine XL 30 milliGRAM(s) Oral daily  nystatin    Suspension 953375 Unit(s) Oral three times a day  pancrelipase  (CREON  6,000 Lipase Units) 1 Capsule(s) Oral three times a day with meals  pantoprazole  Injectable 40 milliGRAM(s) IV Push two times a day  piperacillin/tazobactam IVPB.. 3.375 Gram(s) IV Intermittent every 12 hours  sevelamer carbonate 800 milliGRAM(s) Oral three times a day  sucralfate suspension 1 Gram(s) Oral every 6 hours  thiamine 100 milliGRAM(s) Oral daily    PRN Inpatient Medications  acetaminophen     Tablet .. 650 milliGRAM(s) Oral every 6 hours PRN  cyclobenzaprine 5 milliGRAM(s) Oral three times a day PRN  loperamide 2 milliGRAM(s) Oral three times a day PRN  ondansetron Injectable 4 milliGRAM(s) IV Push every 8 hours PRN  oxyCODONE    IR 7.5 milliGRAM(s) Oral every 4 hours PRN    REVIEW OF SYSTEMS  --------------------------------------------------------------------------------  Gen: No fevers   Respiratory: No dyspnea  CV: No chest pain  GI: No abdominal pain  : No dysuria  MSK: No  edema  Neuro: no dizziness   All other systems were reviewed and are negative, except as noted.    VITALS/PHYSICAL EXAM  --------------------------------------------------------------------------------  T(C): 36.8 (01-03-23 @ 13:07), Max: 36.8 (01-03-23 @ 13:07)  HR: 104 (01-03-23 @ 13:07) (90 - 104)  BP: 131/75 (01-03-23 @ 13:07) (131/75 - 149/85)  RR: 18 (01-03-23 @ 13:07) (17 - 18)  SpO2: 100% (01-03-23 @ 13:07) (100% - 100%)  Wt(kg): --    Physical Exam:  	Gen: ill appearing, frail  	HEENT: dry   	Pulm: CTA today  	CV: S1S2  	Abd: Soft, +BS   	Ext: No LE edema B/L  	Neuro: Awake and alert   	Skin: Warm and dry    LABS/STUDIES  --------------------------------------------------------------------------------              7.3    6.88  >-----------<  174      [01-03-23 @ 06:12]              23.6     146  |  120  |  64  ----------------------------<  79      [01-03-23 @ 06:12]  4.1   |  12  |  5.84        Ca     7.4     [01-03-23 @ 06:12]      Mg     1.50     [01-03-23 @ 06:12]      Phos  6.1     [01-03-23 @ 06:12]    Creatinine Trend:  SCr 5.84 [01-03 @ 06:12]  SCr 5.81 [12-30 @ 05:15]  SCr 5.94 [12-25 @ 06:34]  SCr 5.70 [12-23 @ 11:55]  SCr 5.86 [12-22 @ 06:40]    Urinalysis - [12-18-22 @ 00:40]      Color Light Yellow / Appearance Clear / SG 1.013 / pH 6.0      Gluc Negative / Ketone Negative  / Bili Negative / Urobili <2 mg/dL       Blood Trace / Protein 30 mg/dL / Leuk Est Negative / Nitrite Negative      RBC 2 / WBC 1 / Hyaline  / Gran  / Sq Epi  / Non Sq Epi 1 / Bacteria Negative    Iron 61, TIBC --, %sat --      [12-08-22 @ 08:25]  Ferritin 264      [12-08-22 @ 08:25]  PTH -- (Ca --)      [12-22-22 @ 06:40]   290  HbA1c 5.0      [03-22-19 @ 09:03]  TSH 1.380      [09-01-22 @ 08:58]  Lipid: chol 147, TG 88, HDL 59, LDL --      [09-01-22 @ 08:58]    Free Light Chains: kappa 13.31, lambda 12.94, ratio = 1.03      [12-07 @ 05:35]  Immunofixation Serum: No Monoclonal Band Identified    Reference Range: None Detected      [12-07-22 @ 05:35]  SPEP Interpretation: Hypoalbuminemia      [12-07-22 @ 05:35]

## 2023-01-03 NOTE — PROGRESS NOTE ADULT - PROBLEM SELECTOR PLAN 2
S/p HD X 3  in ICU in [facility], Cr plateaued at 5.7-6, off HD now  - avoid nephrotoxic, renally dose meds, trend Cr  - [facility] renal duplex with severe stenosis of the left renal artery; vascular consulted, no plan for intervention for HESHAM  - per renal plan for AVF creation on this admission as per d/w OP nephrologist baseline Cr is 3 and nearing ESRD  - c/w sevelamer 800 mg TID  - add bicarb 625 TID given Na high  - on calcitriol 0.25 for secondary hyperparathyroidism per renal

## 2023-01-03 NOTE — PROGRESS NOTE ADULT - ASSESSMENT
62M EtoH misuse c/b chronic pancreatitis, HCV s/p treatment (SVR), emphysema, esophageal stricture (s/p stent on 4/2022) transferred [facility] for esophageal stent removal due to intractable n/v. Had been at [facility] from 11/25-12/14 for ams 2/2 septic shock due to pseudomonal PNA, lung abscess, Morganella bacteremia and Klebsiella UTI complicated by ATN requiring HD.  Pending AVF however pending cardiac clearance - pending cardiac cath.

## 2023-01-03 NOTE — PROGRESS NOTE ADULT - PROBLEM SELECTOR PLAN 6
Chronic pancreatitis; previously on Creon, will resume given likely exocrine insufficiency with malabsorption given BMI of 17.   - c/w creon, refusing   - home oxycodone 15mg TID, confirmed in iSTOP--now on 7.5 mg prn

## 2023-01-03 NOTE — PROGRESS NOTE ADULT - PROBLEM SELECTOR PLAN 4
Multiple episodes of diarrhea on antibiotics. Also with history of chronic pancreatitis which may be contributing. On Creon but refusing which is likely why he is having diarrhea. Pt. continues to refuse Creon despite counseling.   - dc bowel regimen, stool count

## 2023-01-03 NOTE — PROGRESS NOTE ADULT - PROBLEM SELECTOR PLAN 4
Pt. with acidosis in setting of renal failure. Serum CO2 low at 12 today. Recommend starting on PO sodium bicarbonate tablets 1300mg BID.    If you have any questions, please feel free to contact me  Jeff Pete  Nephrology Fellow  [phone]/ [employer] Teams(Preferred)  (After 5pm or on weekends please page the on-call fellow).

## 2023-01-03 NOTE — PROGRESS NOTE ADULT - PROBLEM SELECTOR PLAN 1
Pt with YUNIEL on CKD likely multifactorial in the setting of recent COVID infection, poor oral intake. Scr was elevated at 2.20 in September 2022. Pt was recently admitted at [facility]. Scr on admission was significantly elevated at 8.94 on 11/25/22. Pt received 1st HD session on 11/26/22. Last HD session was done on 12/1/22. HD catheter was removed as kidney function was recovering. Scr was elevated at 5.28 on transfer from [facility] on 12/15/22. Scr remains elevated/stable at 5. No urgent indication for HD today.  Of note kidney sonogram had discrepant size renal artery duplex showing HESHAM but BP well controlled. Discussed with Dr. Dukes (12/21) who knows the patient very well and has seen him inpatient and outpatient over the last several years.  As per Dr. Hanks the patient's baseline creatinine is 3 and has advanced CKD and is nearing ESKD. Patient is pending cardiac evaluation for clearance for AVF. I explained to the patient the risk of worsening kidney function after catheterization.  He understands that we will continue to monitor closely for dialysis indications. Recommend NS 60 cc / hr for 6 hours before and after the cardiac catheterization. Monitor labs and urine output. Avoid any potential nephrotoxins. Dose medications as per eGFR.

## 2023-01-03 NOTE — CHART NOTE - NSCHARTNOTEFT_GEN_A_CORE
ACP was called to follow up with cardiology regarding attempting catheterization tomorrow. Was told that they would reach out to cardiology and update the team if the patient will be taken for procedure.    Vascular surgery  [phone]

## 2023-01-03 NOTE — PROGRESS NOTE ADULT - PROBLEM SELECTOR PLAN 3
Pt. with anemia suspected in setting of renal failure. Please check iron studies and ferritin levels. Hb below goal. Monitor Hb.

## 2023-01-03 NOTE — PROGRESS NOTE ADULT - SUBJECTIVE AND OBJECTIVE BOX
Patient is a 62y old  Male who presents with a chief complaint of intractable N/V , esophageal stent removal    SUBJECTIVE / OVERNIGHT EVENTS:    Seen this am, eating breakfast in large quantities, has cups and stuff collected at bedside  asking for more sugar  states lives with his 87 year old mother  denies CP, SOB, f/c/n/v  states he is weak and cannot walk    MEDICATIONS  (STANDING):  calcitriol   Capsule 0.25 MICROGram(s) Oral daily  folic acid 1 milliGRAM(s) Oral daily  heparin   Injectable 5000 Unit(s) SubCutaneous every 8 hours  NIFEdipine XL 30 milliGRAM(s) Oral daily  nystatin    Suspension 048160 Unit(s) Oral three times a day  pancrelipase  (CREON  6,000 Lipase Units) 1 Capsule(s) Oral three times a day with meals  pantoprazole  Injectable 40 milliGRAM(s) IV Push two times a day  piperacillin/tazobactam IVPB.. 3.375 Gram(s) IV Intermittent every 12 hours  sevelamer carbonate 800 milliGRAM(s) Oral three times a day  sucralfate suspension 1 Gram(s) Oral every 6 hours  thiamine 100 milliGRAM(s) Oral daily    MEDICATIONS  (PRN):  acetaminophen     Tablet .. 650 milliGRAM(s) Oral every 6 hours PRN Mild Pain (1 - 3), Moderate Pain (4 - 6)  cyclobenzaprine 5 milliGRAM(s) Oral three times a day PRN Muscle Spasm  loperamide 2 milliGRAM(s) Oral three times a day PRN Diarrhea  ondansetron Injectable 4 milliGRAM(s) IV Push every 8 hours PRN Nausea and/or Vomiting  oxyCODONE    IR 7.5 milliGRAM(s) Oral every 4 hours PRN mod-severe pain    T(C): 36.8 (01-03-23 @ 13:07), Max: 36.8 (01-03-23 @ 13:07)  HR: 104 (01-03-23 @ 13:07) (90 - 104)  BP: 131/75 (01-03-23 @ 13:07) (131/75 - 148/76)  RR: 18 (01-03-23 @ 13:07) (17 - 18)  SpO2: 100% (01-03-23 @ 13:07) (100% - 100%)    PHYSICAL EXAM:  GENERAL: NAD, thin, appears older than stated age   CHEST/LUNG: Clear to auscultation bilaterally; No wheeze  HEART: Regular rate and rhythm; No murmurs, rubs, or gallops  ABDOMEN: Soft, Nontender, Nondistended; Bowel sounds present  EXTREMITIES:   warm and well perfused, No clubbing, cyanosis, or edema  PSYCH: AAOx3  NEUROLOGY: non-focal    LABS:                        7.3    6.88  )-----------( 174      ( 03 Jan 2023 06:12 )             23.6     01-03    146<H>  |  120<H>  |  64<H>  ----------------------------<  79  4.1   |  12<L>  |  5.84<H>    Ca    7.4<L>      03 Jan 2023 06:12  Phos  6.1     01-03  Mg     1.50     01-03    Consultant(s) Notes Reviewed:  cards, renal  Care Discussed with Consultants/Other Providers: cards

## 2023-01-03 NOTE — PROGRESS NOTE ADULT - PROBLEM SELECTOR PLAN 1
Intermittent epigastric pain  - trops elevated on admission 210->199, also elevated at OSH   - EKG with T wave flattening in inferolateral leads, consistent with prior study.   - TTE showing new mild global LV dysfunction   - NST positive with inferior wall motion abnormality  - pending cath but did not perform given multiple episodes of diarrhea, f/u cards re: rescheduling

## 2023-01-03 NOTE — PROGRESS NOTE ADULT - PROBLEM SELECTOR PLAN 3
Hx of benign appearing esophageal stricture s/p stent on 4/2022 by Dr. Erickson   - GI Consulted now s/p stent removal 12/16, path neg h pylori, some metaplasia  - per GI c/w Protonix 40 mg BID & Carafate suspension 1g q6 hrs, refusing   - tolerating diet

## 2023-01-04 NOTE — PROGRESS NOTE ADULT - SUBJECTIVE AND OBJECTIVE BOX
[facility] DIVISION OF KIDNEY DISEASES AND HYPERTENSION -- FOLLOW UP NOTE  --------------------------------------------------------------------------------  HPI: Pt is a 62-year-old Male with Hx of esophageal stricture (S/p stenting in 5/22), Emphysema, HCV, chronic pancreatitis who initially presented to [facility] on 11/25/22 for AMS. Pt was admitted to [facility] on 11/25 for AMS 2/2 Septic shock. Pt was initially intubated for acute respiratory failure and required pressors for hypotension. Pt's hospital course was complicated by YUNIEL/ATN requiring HD. Pt was extubated and was transferred to [facility] for esophageal stent removal by Dr. Erickson. Initial labs on this admission concerning for elevated Scr. Nephrology team following for YUNIEL on CKD.     On review of [facility], it was noted that Scr was elevated at 2.20 in September 2022. Scr on admission was significantly elevated at 8.94 on 11/25/22. Pt received 1st HD session on 11/26/22. Last HD session was done on 12/1/22. HD catheter was removed as kidney function was recovering. Scr was elevated at 5.28 on transfer from [facility] on 12/15/22. Scr remains elevated/stable at 5-6 range. Scr is elevated/stable at 5.75 today.     Pt. seen and examined this morning. This morning reported of nausea. Also reported of having emesis 1 episode early morning. Denies any chest pain, shortness of breath, or dizziness. Reports that diarrhea has resolved. Pt planned for cardiac cath today (tentatively).    PAST HISTORY  --------------------------------------------------------------------------------  No significant changes to PMH, PSH, FHx, SHx, unless otherwise noted    ALLERGIES & MEDICATIONS  --------------------------------------------------------------------------------  Allergies    Tylenol (Unknown)    Intolerances    Standing Inpatient Medications  calcitriol   Capsule 0.25 MICROGram(s) Oral daily  folic acid 1 milliGRAM(s) Oral daily  heparin   Injectable 5000 Unit(s) SubCutaneous every 8 hours  multivitamin 1 Tablet(s) Oral daily  NIFEdipine XL 30 milliGRAM(s) Oral daily  nystatin    Suspension 214680 Unit(s) Oral three times a day  pancrelipase  (CREON  6,000 Lipase Units) 1 Capsule(s) Oral three times a day with meals  pantoprazole    Tablet 40 milliGRAM(s) Oral before breakfast  pantoprazole    Tablet 40 milliGRAM(s) Oral <User Schedule>  piperacillin/tazobactam IVPB.. 3.375 Gram(s) IV Intermittent every 12 hours  sevelamer carbonate 800 milliGRAM(s) Oral three times a day  sodium bicarbonate 650 milliGRAM(s) Oral three times a day  sodium chloride 0.9%. 1000 milliLiter(s) IV Continuous <Continuous>  sucralfate suspension 1 Gram(s) Oral every 6 hours  thiamine 100 milliGRAM(s) Oral daily    PRN Inpatient Medications  acetaminophen     Tablet .. 650 milliGRAM(s) Oral every 6 hours PRN  loperamide 2 milliGRAM(s) Oral three times a day PRN  oxyCODONE    IR 7.5 milliGRAM(s) Oral every 4 hours PRN    REVIEW OF SYSTEMS  --------------------------------------------------------------------------------  Gen: No fevers, See HPI   Respiratory: No dyspnea  CV: No chest pain  GI: No abdominal pain  : No dysuria  MSK: No  edema  Neuro: no dizziness   All other systems were reviewed and are negative, except as noted.    VITALS/PHYSICAL EXAM  --------------------------------------------------------------------------------  T(C): 37.1 (01-04-23 @ 09:25), Max: 37.1 (01-04-23 @ 09:25)  HR: 92 (01-04-23 @ 09:25) (92 - 104)  BP: 129/71 (01-04-23 @ 09:25) (128/70 - 138/74)  RR: 18 (01-04-23 @ 09:25) (18 - 18)  SpO2: 100% (01-04-23 @ 09:25) (100% - 100%)  Wt(kg): --    Physical Exam:  	Gen: ill appearing, frail  	HEENT: dry   	Pulm: CTA today  	CV: S1S2  	Abd: Soft, +BS   	Ext: No LE edema B/L  	Neuro: Awake and alert   	Skin: Warm and dry    LABS/STUDIES  --------------------------------------------------------------------------------              7.3    7.49  >-----------<  206      [01-04-23 @ 06:45]              23.5     140  |  115  |  63  ----------------------------<  84      [01-04-23 @ 06:45]  4.0   |  12  |  5.75        Ca     7.8     [01-04-23 @ 06:45]      Mg     1.70     [01-04-23 @ 06:45]      Phos  5.2     [01-04-23 @ 06:45]    Creatinine Trend:  SCr 5.75 [01-04 @ 06:45]  SCr 5.84 [01-03 @ 06:12]  SCr 5.81 [12-30 @ 05:15]  SCr 5.94 [12-25 @ 06:34]  SCr 5.70 [12-23 @ 11:55]    Urinalysis - [12-18-22 @ 00:40]      Color Light Yellow / Appearance Clear / SG 1.013 / pH 6.0      Gluc Negative / Ketone Negative  / Bili Negative / Urobili <2 mg/dL       Blood Trace / Protein 30 mg/dL / Leuk Est Negative / Nitrite Negative      RBC 2 / WBC 1 / Hyaline  / Gran  / Sq Epi  / Non Sq Epi 1 / Bacteria Negative    Iron 104, TIBC TNP, %sat --      [01-03-23 @ 06:12]  Ferritin 311      [01-03-23 @ 06:12]  PTH -- (Ca --)      [12-22-22 @ 06:40]   290  HbA1c 5.0      [03-22-19 @ 09:03]  TSH 1.380      [09-01-22 @ 08:58]  Lipid: chol 147, TG 88, HDL 59, LDL --      [09-01-22 @ 08:58]    Free Light Chains: kappa 13.31, lambda 12.94, ratio = 1.03      [12-07 @ 05:35]  Immunofixation Serum: No Monoclonal Band Identified    Reference Range: None Detected      [12-07-22 @ 05:35]  SPEP Interpretation: Hypoalbuminemia      [12-07-22 @ 05:35]

## 2023-01-04 NOTE — CHART NOTE - NSCHARTNOTEFT_GEN_A_CORE
Pt went for cath procedure today with Right radial artery accessed. Pt denies SOB, CP, swelling, edema, paresthesia distal to site or pain at site. Site checked. No ecchymosis, hematoma, or swelling within access point. Active and passive ROM of R shoulder, R elbow, R wrist, and fingers without deficits. Motor and Sensory intact. 2+ peripheral pulses intact.  Pt educated to look out for bruising, swelling, tingling, and numbness of site/distal to site and notify RN. Will continue to monitor overnight.

## 2023-01-04 NOTE — PROGRESS NOTE ADULT - ASSESSMENT
62 M with YUNIEL on advanced CKD nearing ESRD, intermittent HD with HD catheter in the past 2 years.   Vascular surgery is consulted for AVF planning.     Plan:     - Cath reviewed, non ischemic   - cards clearance to be documented  - tentatively booked for OR 1/5   - please keep NPO     Vascular Surgery  #[phone]

## 2023-01-04 NOTE — PROGRESS NOTE ADULT - PROBLEM SELECTOR PLAN 2
Secondary hyperparathyroidism.  ionized calcium was low and was started on calcitriol 0.25mcg daily. Phos levels within acceptable range today. Continue with  Sevelamer 800mg TID with meals. Monitor phos levels.

## 2023-01-04 NOTE — PROGRESS NOTE ADULT - ATTENDING COMMENTS
advanced CKD   hypernatremia better   acidosis worse increase Bicarbonate   anemia Low iron indices will start IV Venofer    s/p cardiac cath . pending fistula   Assessment and plan as outlined above. Monitor labs and urine output. Avoid any potential nephrotoxins. Dose medications as per eGFR.  Further detailed plan of management and recommendation as outlined above by the renal fellow.

## 2023-01-04 NOTE — CHART NOTE - NSCHARTNOTEFT_GEN_A_CORE
Source: Patient A&Ox4   Family [ ]     RN [x ]    Chart [x ]    Reason for Follow-Up Assessment: severe protein calorie malnutrition     62M EtoH misuse c/b chronic pancreatitis, HCV s/p treatment (SVR), emphysema, esophageal stricture (s/p stent on 4/2022) transferred [facility] for esophageal stent removal due to intractable n/v. Had been at [facility] from 11/25-12/14 for ams 2/2 septic shock due to pseudomonal PNA, lung abscess, Morganella bacteremia and Klebsiella UTI complicated by ATN requiring HD.  Pending AVF however pending cardiac clearance - pending cardiac cath.     Pt is eating 100% of his meals. Hoarding food, and requesting extra snacks/meals. Pt educated on importance of Creon supplement to absorb fat-soluble vitamins.  Further education not appropriate at this time.    Pt states most of his medications give him abdominal pain, and that's the reason for his refusal. +diarrhea. No nausea/vomiting. Denies any chewing or swallowing difficulties at this time.    Noted that pt is NPO past midnight today for cardiac cath.        Diet, NPO after Midnight:      NPO Start Date: 03-Jan-2023,   NPO Start Time: 23:59  Except Medications (01-03-23 @ 18:18)  Diet, Regular:   No Caffeine  Supplement Feeding Modality:  Oral  Ensure Plus High Protein Cans or Servings Per Day:  1       Frequency:  Three Times a day (12-28-22 @ 12:03)      GI: diarrhea Last BM 1/3    PO intake:  < 50% [ ] 50-75% [ ]   % [x ]  other :        Weight (kg): 59.1 (12-16), 42.3 (11-25). No new weights      Edema: none  Pressure Injuries: R buttocks stage II     __________________ Pertinent Medications__________________   MEDICATIONS  (STANDING):  calcitriol   Capsule 0.25 MICROGram(s) Oral daily  folic acid 1 milliGRAM(s) Oral daily  heparin   Injectable 5000 Unit(s) SubCutaneous every 8 hours  multivitamin 1 Tablet(s) Oral daily  NIFEdipine XL 30 milliGRAM(s) Oral daily  nystatin    Suspension 950252 Unit(s) Oral three times a day  pancrelipase  (CREON  6,000 Lipase Units) 1 Capsule(s) Oral three times a day with meals  pantoprazole    Tablet 40 milliGRAM(s) Oral before breakfast  pantoprazole    Tablet 40 milliGRAM(s) Oral <User Schedule>  piperacillin/tazobactam IVPB.. 3.375 Gram(s) IV Intermittent every 12 hours  sevelamer carbonate 800 milliGRAM(s) Oral three times a day  sodium bicarbonate 650 milliGRAM(s) Oral three times a day  sodium chloride 0.9%. 1000 milliLiter(s) (60 mL/Hr) IV Continuous <Continuous>  sucralfate suspension 1 Gram(s) Oral every 6 hours  thiamine 100 milliGRAM(s) Oral daily    MEDICATIONS  (PRN):  acetaminophen     Tablet .. 650 milliGRAM(s) Oral every 6 hours PRN Mild Pain (1 - 3), Moderate Pain (4 - 6)  loperamide 2 milliGRAM(s) Oral three times a day PRN Diarrhea  oxyCODONE    IR 7.5 milliGRAM(s) Oral every 4 hours PRN mod-severe pain      __________________ Pertinent Labs__________________   01-04 Na140 mmol/L Glu 84 mg/dL K+ 4.0 mmol/L Cr  5.75 mg/dL<H> BUN 63 mg/dL<H> 01-04 Phos 5.2 mg/dL<H> 12-30 Alb 2.3 g/dL<L>    Estimated Needs:   [x ] no change since previous assessment        Previous Nutrition Diagnosis: severe protein calorie malnutrition     Nutrition Diagnosis is [x ] ongoing       Recommendations:  1. Continue current diet order.   2. Encourage PO intake and honor food preferences as able.   3. Continue to document PO in RN flow sheets and monitor weekly weights.   4. Please take updated weight.         Monitoring and Evaluation:      [ x] Tolerance to diet prescription [x ] weights [x ] follow up per protocol  [ ] other:

## 2023-01-04 NOTE — PROGRESS NOTE ADULT - PROBLEM SELECTOR PLAN 2
S/p HD X 3  in ICU in [facility], Cr plateaued at 5.7-6, off HD now  - avoid nephrotoxic, renally dose meds, trend Cr  - [facility] duplex with severe stenosis of the left renal artery; vascular consulted, no plan for intervention for HESHAM  - per renal plan for AVF creation on this admission as per d/w OP nephrologist baseline Cr is 3 and nearing ESRD  - c/w sevelamer 800 mg TID (refusing)  - add bicarb 625 TID given Na high (refusing)   - on calcitriol 0.25 for secondary hyperparathyroidism per renal

## 2023-01-04 NOTE — CHART NOTE - NSCHARTNOTEFT_GEN_A_CORE
ACP spoke with Vascular. Plan for AVF creation in OR tomorrow morning 1/5/23.     Vascular requested cardiology note for clearance. Cardiology notified.    Prudencio Aiken PA-C,   Internal Medicine ACP   In house pager #[phone]

## 2023-01-04 NOTE — PROGRESS NOTE ADULT - PROBLEM SELECTOR PLAN 6
Chronic pancreatitis; previously on Creon, will resume given likely exocrine insufficiency with malabsorption given BMI of 17.   - c/w Creon, refusing, educated on importance   - home oxycodone 15mg TID, confirmed in iSTOP--now on 7.5 mg prn

## 2023-01-04 NOTE — PROGRESS NOTE ADULT - PROBLEM SELECTOR PLAN 5
Septic shock due to pseudomonal PNA with lung abscess, morganella bacteremia and kelbsiella UTI.  Was on Zosyn since 11/25, as per ID in [facility] who recommended 4 week course of antibiotics versus possibly longer pending imaging at 4 weeks (12/23)  - CXR 12/23 - Stable RUL lesion, with new RLL pneumonia   - ID recommending to continue antibiotics for at least 2 more weeks (through 1/6) to c/w zosyn while inpatient, per ID can transition to PO Ceftin when ready for discharge; will f/u re: completion of abx re: need for repeat imaging   - SLP ->Soft and Bite Sized Solids with Thin Liquids

## 2023-01-04 NOTE — PROGRESS NOTE ADULT - PROBLEM SELECTOR PLAN 4
Pt. with acidosis in setting of renal failure. Serum CO2 low at 12 today. Recommend increasing PO sodium bicarbonate tablets to 1300mg BID.    If you have any questions, please feel free to contact me  Jeff Pete  Nephrology Fellow  [phone]/ [employer] Teams(Preferred)  (After 5pm or on weekends please page the on-call fellow).

## 2023-01-04 NOTE — PROGRESS NOTE ADULT - PROBLEM SELECTOR PLAN 1
Intermittent epigastric pain  - trops elevated on admission 210->199, also elevated at OSH   - EKG with T wave flattening in inferolateral leads, consistent with prior study   - TTE showing new mild global LV dysfunction   - NST positive with inferior wall motion abnormality  - pending cath today 1/4

## 2023-01-04 NOTE — PROGRESS NOTE ADULT - SUBJECTIVE AND OBJECTIVE BOX
Patient is a 62y old  Male who presents with a chief complaint of intractable N/V , esophageal stent removal    SUBJECTIVE / OVERNIGHT EVENTS:    No CP , SOB, nausea/vomiting  3 loose stool yesterday, RN cannot corroborate this, states has one large BM that was soft  reports he is constantly eating and eats spoiled food at times   asked pt why he refuses his meds but he will not provide and answer, "they are just not right meds for me"    MEDICATIONS  (STANDING):  calcitriol   Capsule 0.25 MICROGram(s) Oral daily  folic acid 1 milliGRAM(s) Oral daily  heparin   Injectable 5000 Unit(s) SubCutaneous every 8 hours  multivitamin 1 Tablet(s) Oral daily  NIFEdipine XL 30 milliGRAM(s) Oral daily  nystatin    Suspension 464459 Unit(s) Oral three times a day  pancrelipase  (CREON  6,000 Lipase Units) 1 Capsule(s) Oral three times a day with meals  pantoprazole    Tablet 40 milliGRAM(s) Oral before breakfast  pantoprazole    Tablet 40 milliGRAM(s) Oral <User Schedule>  piperacillin/tazobactam IVPB.. 3.375 Gram(s) IV Intermittent every 12 hours  sevelamer carbonate 800 milliGRAM(s) Oral three times a day  sodium bicarbonate 650 milliGRAM(s) Oral three times a day  sodium chloride 0.9%. 1000 milliLiter(s) (60 mL/Hr) IV Continuous <Continuous>  sucralfate suspension 1 Gram(s) Oral every 6 hours  thiamine 100 milliGRAM(s) Oral daily    MEDICATIONS  (PRN):  acetaminophen     Tablet .. 650 milliGRAM(s) Oral every 6 hours PRN Mild Pain (1 - 3), Moderate Pain (4 - 6)  loperamide 2 milliGRAM(s) Oral three times a day PRN Diarrhea  oxyCODONE    IR 7.5 milliGRAM(s) Oral every 4 hours PRN mod-severe pain    T(C): 37.1 (01-04-23 @ 09:25), Max: 37.1 (01-04-23 @ 09:25)  HR: 92 (01-04-23 @ 09:25) (92 - 102)  BP: 129/71 (01-04-23 @ 09:25) (128/70 - 138/74)  RR: 18 (01-04-23 @ 09:25) (18 - 18)  SpO2: 100% (01-04-23 @ 09:25) (100% - 100%)    PHYSICAL EXAM:  GENERAL: NAD, thin, appears older than stated age   CHEST/LUNG: Clear to auscultation bilaterally; No wheeze  HEART: Regular rate and rhythm; No murmurs, rubs, or gallops  ABDOMEN: Soft, Nontender, Nondistended; Bowel sounds present  EXTREMITIES:   warm and well perfused, No clubbing, cyanosis, or edema  PSYCH: AAOx3  NEUROLOGY: non-focal    LABS:                        7.3    7.49  )-----------( 206      ( 04 Jan 2023 06:45 )             23.5     01-04    140  |  115<H>  |  63<H>  ----------------------------<  84  4.0   |  12<L>  |  5.75<H>    Ca    7.8<L>      04 Jan 2023 06:45  Phos  5.2     01-04  Mg     1.70     01-04    Consultant(s) Notes Reviewed:    Care Discussed with Consultants/Other Providers: cards, vascular   Patient is a 62y old  Male who presents with a chief complaint of intractable N/V , esophageal stent removal    SUBJECTIVE / OVERNIGHT EVENTS:    No CP , SOB, nausea/vomiting  3 loose stool yesterday, RN cannot corroborate this, states has one large BM that was soft  reports he is constantly eating and eats spoiled food at times   asked pt why he refuses his meds but he will not provide and answer, "they are just not right meds for me"    MEDICATIONS  (STANDING):  calcitriol   Capsule 0.25 MICROGram(s) Oral daily  folic acid 1 milliGRAM(s) Oral daily  heparin   Injectable 5000 Unit(s) SubCutaneous every 8 hours  multivitamin 1 Tablet(s) Oral daily  NIFEdipine XL 30 milliGRAM(s) Oral daily  nystatin    Suspension 433614 Unit(s) Oral three times a day  pancrelipase  (CREON  6,000 Lipase Units) 1 Capsule(s) Oral three times a day with meals  pantoprazole    Tablet 40 milliGRAM(s) Oral before breakfast  pantoprazole    Tablet 40 milliGRAM(s) Oral <User Schedule>  piperacillin/tazobactam IVPB.. 3.375 Gram(s) IV Intermittent every 12 hours  sevelamer carbonate 800 milliGRAM(s) Oral three times a day  sodium bicarbonate 650 milliGRAM(s) Oral three times a day  sodium chloride 0.9%. 1000 milliLiter(s) (60 mL/Hr) IV Continuous <Continuous>  sucralfate suspension 1 Gram(s) Oral every 6 hours  thiamine 100 milliGRAM(s) Oral daily    MEDICATIONS  (PRN):  acetaminophen     Tablet .. 650 milliGRAM(s) Oral every 6 hours PRN Mild Pain (1 - 3), Moderate Pain (4 - 6)  loperamide 2 milliGRAM(s) Oral three times a day PRN Diarrhea  oxyCODONE    IR 7.5 milliGRAM(s) Oral every 4 hours PRN mod-severe pain    T(C): 37.1 (01-04-23 @ 09:25), Max: 37.1 (01-04-23 @ 09:25)  HR: 92 (01-04-23 @ 09:25) (92 - 102)  BP: 129/71 (01-04-23 @ 09:25) (128/70 - 138/74)  RR: 18 (01-04-23 @ 09:25) (18 - 18)  SpO2: 100% (01-04-23 @ 09:25) (100% - 100%)    PHYSICAL EXAM:  GENERAL: NAD, thin, appears older than stated age   CHEST/LUNG: Clear to auscultation bilaterally; No wheeze  HEART: Regular rate and rhythm; No murmurs, rubs, or gallops  ABDOMEN: Soft, Nontender, Nondistended; Bowel sounds present  EXTREMITIES:   warm and well perfused, No clubbing, cyanosis, or edema; R elbow swelling in dependent area, ?IVF infiltation as IV distal to site (RN advised)  PSYCH: AAOx3  NEUROLOGY: non-focal    LABS:                        7.3    7.49  )-----------( 206      ( 04 Jan 2023 06:45 )             23.5     01-04    140  |  115<H>  |  63<H>  ----------------------------<  84  4.0   |  12<L>  |  5.75<H>    Ca    7.8<L>      04 Jan 2023 06:45  Phos  5.2     01-04  Mg     1.70     01-04    Consultant(s) Notes Reviewed:    Care Discussed with Consultants/Other Providers: cards, vascular

## 2023-01-04 NOTE — PROGRESS NOTE ADULT - PROBLEM SELECTOR PLAN 1
Pt with YUNIEL on CKD likely multifactorial in the setting of recent COVID infection, poor oral intake. Scr was elevated at 2.20 in September 2022. Pt was recently admitted at [facility]. Scr on admission was significantly elevated at 8.94 on 11/25/22. Pt received 1st HD session on 11/26/22. Last HD session was done on 12/1/22. HD catheter was removed as kidney function was recovering. Scr was elevated at 5.28 on transfer from [facility] on 12/15/22. Scr remains elevated/stable at 5.75 today (1/4/23). No urgent indication for HD today. Of note kidney sonogram had discrepant size renal artery duplex showing HESHAM but BP well controlled. Discussed with Dr. Dukes (12/21) who knows the patient very well and has seen him inpatient and outpatient over the last several years. As per Dr. Hanks the patient's baseline creatinine is 3 and has advanced CKD and is nearing ESKD. Patient is pending cardiac evaluation for clearance for AVF. I explained to the patient the risk of worsening kidney function after catheterization. He understands that we will continue to monitor closely for dialysis indications. Pt tentatively planned for cardiac cath today. Currently receiving NS . Agree with IVFs. Recommend NS 60 cc / hr for 6 hours before and after the cardiac catheterization. Monitor labs and urine output. Avoid any potential nephrotoxins. Dose medications as per eGFR.

## 2023-01-04 NOTE — PROGRESS NOTE ADULT - ASSESSMENT
62M EtoH misuse c/b chronic pancreatitis, HCV s/p treatment (SVR), emphysema, esophageal stricture (s/p stent on 4/2022) transferred [facility] for esophageal stent removal due to intractable n/v. Had been at [facility] from 11/25-12/14 for ams 2/2 septic shock due to pseudomonal PNA, lung abscess, Morganella bacteremia and Klebsiella UTI complicated by ATN requiring HD.  Pending AVF however pending cardiac clearance - pending cardiac cath today,

## 2023-01-04 NOTE — PROGRESS NOTE ADULT - SUBJECTIVE AND OBJECTIVE BOX
GENERAL SURGERY PROGRESS NOTE    no issues overnight  getting Cath today     10-point review of systems completed and negative except as noted above.      OBJECTIVE    PHYSICAL EXAM  T(C): 37.1 (01-04-23 @ 09:25), Max: 37.1 (01-04-23 @ 09:25)  HR: 92 (01-04-23 @ 09:25) (92 - 102)  BP: 129/71 (01-04-23 @ 09:25) (129/71 - 138/74)  RR: 18 (01-04-23 @ 09:25) (18 - 18)  SpO2: 100% (01-04-23 @ 09:25) (100% - 100%)      General: Appears well, NAD  Neuro: AAOx3  CHEST: Clear to auscultation bilaterally  CV: Regular rate and rhythm  Abdomen: soft, nontender, nondistended, no rebound or guarding  Extremities: Grossly symmetric    LABS                        7.3    7.49  )-----------( 206      ( 04 Jan 2023 06:45 )             23.5     01-04    140  |  115<H>  |  63<H>  ----------------------------<  84  4.0   |  12<L>  |  5.75<H>    Ca    7.8<L>      04 Jan 2023 06:45  Phos  5.2     01-04  Mg     1.70     01-04            RADIOLOGY & ADDITIONAL STUDIES

## 2023-01-05 NOTE — DISCHARGE NOTE PROVIDER - NSFOLLOWUPCLINICS_GEN_ALL_ED_FT
[facility] General Internal Medicine  General Internal Medicine  [address] [place] [address]  Phone: [phone]  Fax:

## 2023-01-05 NOTE — PROGRESS NOTE ADULT - SUBJECTIVE AND OBJECTIVE BOX
Patient is a 62y old  Male who presents with a chief complaint of intractable N/V , esophageal stent removal    SUBJECTIVE / OVERNIGHT EVENTS:    No CP, SOB, f/c/n/v  Reports feels fine  advised of cath findings, aware pending AVF  still oddly related, hoarding food and not asking RNs to clean him s/p BMs  per RN yesterday 2 BM during day shift, pasty, no estella diarrhea   for AVF today  still refusing all PO meds     MEDICATIONS  (STANDING):  calcitriol   Capsule 0.25 MICROGram(s) Oral daily  folic acid 1 milliGRAM(s) Oral daily  heparin   Injectable 5000 Unit(s) SubCutaneous every 8 hours  iron sucrose IVPB 200 milliGRAM(s) IV Intermittent <User Schedule>  multivitamin 1 Tablet(s) Oral daily  NIFEdipine XL 30 milliGRAM(s) Oral daily  pancrelipase  (CREON  6,000 Lipase Units) 1 Capsule(s) Oral three times a day with meals  pantoprazole    Tablet 40 milliGRAM(s) Oral before breakfast  pantoprazole    Tablet 40 milliGRAM(s) Oral <User Schedule>  piperacillin/tazobactam IVPB.. 3.375 Gram(s) IV Intermittent every 12 hours  sevelamer carbonate 800 milliGRAM(s) Oral three times a day  sodium bicarbonate 1300 milliGRAM(s) Oral four times a day  sodium chloride 0.9%. 1000 milliLiter(s) (60 mL/Hr) IV Continuous <Continuous>  sucralfate suspension 1 Gram(s) Oral every 6 hours  thiamine 100 milliGRAM(s) Oral daily    MEDICATIONS  (PRN):  acetaminophen     Tablet .. 650 milliGRAM(s) Oral every 6 hours PRN Mild Pain (1 - 3), Moderate Pain (4 - 6)  loperamide 2 milliGRAM(s) Oral three times a day PRN Diarrhea  oxyCODONE    IR 7.5 milliGRAM(s) Oral every 4 hours PRN mod-severe pain    T(C): 36.8 (01-05-23 @ 06:53), Max: 37.1 (01-04-23 @ 09:25)  HR: 93 (01-05-23 @ 06:53) (91 - 98)  BP: 136/77 (01-05-23 @ 06:53) (129/71 - 158/84)  RR: 17 (01-05-23 @ 06:53) (17 - 18)  SpO2: 100% (01-05-23 @ 06:53) (100% - 100%)    PHYSICAL EXAM:  GENERAL: NAD, thin, appears older than stated age   CHEST/LUNG: Clear to auscultation bilaterally; No wheeze  HEART: Regular rate and rhythm; No murmurs, rubs, or gallops  ABDOMEN: Soft, Nontender, Nondistended; Bowel sounds present  EXTREMITIES:   warm and well perfused, No clubbing, cyanosis, or edema; R elbow swelling in dependent area, ?IVF infiltation improved   PSYCH: AAOx3  NEUROLOGY: non-focal    LABS:                        7.2    7.52  )-----------( 184      ( 05 Jan 2023 02:43 )             23.4     01-05    138  |  113<H>  |  64<H>  ----------------------------<  112<H>  3.9   |  15<L>  |  5.40<H>    Ca    7.7<L>      05 Jan 2023 02:43  Phos  5.4     01-05  Mg     1.90     01-05    PT/INR - ( 05 Jan 2023 02:43 )   PT: 11.5 sec;   INR: 0.99 ratio    PTT - ( 05 Jan 2023 02:43 )  PTT:28.4 sec    Consultant(s) Notes Reviewed:  vascular, renal, cards   Care Discussed with Consultants/Other Providers: cards, renal

## 2023-01-05 NOTE — PROGRESS NOTE ADULT - ASSESSMENT
62M EtoH misuse c/b chronic pancreatitis, HCV s/p treatment (SVR), emphysema, esophageal stricture (s/p stent on 4/2022) transferred [facility] for esophageal stent removal due to intractable n/v. Had been at [facility] from 11/25-12/14 for ams 2/2 septic shock due to pseudomonal PNA, lung abscess, Morganella bacteremia and Klebsiella UTI complicated by ATN requiring HD.  Cardiac cath on 1/4 with nonobstructive CAD now pending AVF w/ vascular today.

## 2023-01-05 NOTE — PROGRESS NOTE ADULT - PROBLEM SELECTOR PLAN 10
BP slightly elevated, intermittently refusing BP meds   - renal dopplers show left sided HESHAM 60-99%, vascular not recommending intervention   - c/w Nifedipine 30mg QD, refuses

## 2023-01-05 NOTE — PROGRESS NOTE ADULT - PROBLEM SELECTOR PLAN 4
Pt. with acidosis in setting of renal failure. Serum CO2 low at 15 today. Recommend starting Bicitra 15ml OP BID. Monitor SCO2.     If you have any questions, please feel free to contact me  Jeff Pete  Nephrology Fellow  [phone]/ [employer] Teams(Preferred)  (After 5pm or on weekends please page the on-call fellow). Pt. with acidosis in setting of renal failure. Serum CO2 low at 15 today. Recommend starting Bicitra 15ml PO BID. Monitor SCO2.     If you have any questions, please feel free to contact me  Jeff Pete  Nephrology Fellow  [phone]/ [employer] Teams(Preferred)  (After 5pm or on weekends please page the on-call fellow).

## 2023-01-05 NOTE — PROGRESS NOTE ADULT - ASSESSMENT
62 M with YUNIEL on advanced CKD nearing ESRD, intermittent HD with HD catheter in the past 2 years.   Vascular surgery is consulted for AVF planning.     Plan:   - AVF creation planned for today   - Cardiac cath performed 1/4   - Cardiac clearance documented 1/4  - NPO since midnight   - will f/u pre op am labs     Vascular Surgery  #[id number]

## 2023-01-05 NOTE — PROGRESS NOTE ADULT - PROBLEM SELECTOR PLAN 2
Intermittent epigastric pain  - trops elevated on admission 210->199, also elevated at OSH   - EKG with T wave flattening in inferolateral leads, consistent with prior study   - TTE showing new mild global LV dysfunction   - NST positive with inferior wall motion abnormality  - cath on 1/4 nonobstructive CAD, no intervention  - should me on DMT however ace/arb contraindicated in setting of CKD5 not on HD at risk for hyperkalemia also not taking any meds that are prescribed despite encouragement

## 2023-01-05 NOTE — DISCHARGE NOTE PROVIDER - NSDCMRMEDTOKEN_GEN_ALL_CORE_FT
bisacodyl 5 mg oral delayed release tablet: 1 tab(s) orally once a day (at bedtime)  cyclobenzaprine 5 mg oral tablet: 1 tab(s) orally 3 times a day, As needed, hiccuping MDD:15mg  folic acid 1 mg oral tablet: 1 tab(s) orally once a day  MiraLax oral powder for reconstitution: 17 gram(s) orally 2 times a day  NIFEdipine 30 mg oral tablet, extended release: 1 tab(s) orally once a day  oxyCODONE 5 mg oral tablet: 3 tab(s) orally every 6 hours  pantoprazole 40 mg oral delayed release tablet: 1 tab(s) orally once a day (before a meal)  thiamine 100 mg oral tablet: 1 tab(s) orally once a day   apixaban 5 mg oral tablet: 1 tab(s) orally 2 times a day  bisacodyl 5 mg oral delayed release tablet: 1 tab(s) orally every 12 hours, As needed, Constipation  cadexomer iodine 0.9% topical gel: 1 application topically once a day  calcitriol 0.5 mcg oral capsule: 1 cap(s) orally once a day  folic acid 1 mg oral tablet: 1 tab(s) orally once a day  lidocaine 4% topical film: Apply topically to affected area once a day  metoclopramide 5 mg oral tablet: 1 tab(s) orally 3 times a day  Multiple Vitamins oral tablet: 1 tab(s) orally once a day  NIFEdipine 30 mg oral tablet, extended release: 1 tab(s) orally once a day  oxyCODONE 10 mg oral tablet: 1 tab(s) orally every 4 hours, As needed, Severe Pain (7 - 10)  pancrelipase 6000 units-19,000 units-30,000 units oral delayed release capsule: 1 cap(s) orally 3 times a day (with meals)  pantoprazole 40 mg oral delayed release tablet: 1 tab(s) orally every 12 hours  polyethylene glycol 3350 oral powder for reconstitution: 17 gram(s) orally 2 times a day  senna leaf extract oral tablet: 2 tab(s) orally once a day (at bedtime)  sucralfate 1 g oral tablet: 1 tab(s) orally every 6 hours  thiamine 100 mg oral tablet: 1 tab(s) orally once a day  tiZANidine 2 mg oral tablet: 1 tab(s) orally every 8 hours, As needed, muscle cramps

## 2023-01-05 NOTE — DISCHARGE NOTE PROVIDER - HOSPITAL COURSE
62M with esophageal stricture s/p stent on 4/2022 by Dr. Hernandez, Emphysema, HCV s/p treatment w/ SVR, alcohol misuse c/b chronic pancreatitis transferred from [facility] for esophageal stent removal due to intractable n/v. Pt was admitted to [facility] on 11/25 for AMS due to septic shock secondary to psuedomonal PNA with lung abscess, morganella bacteremia and kelbsiella UTI. Pt was initially intubated for acute respiratory failure and required pressors for hypotension . Pt's hospital course was complicated by YUNIEL due to septic ATN requiring 3 rounds of HD. Pt developed intractable N/V during his hospital stay and was transferred to [facility] for esophageal stent removal by Dr. Erickson.  Pt was also found to be COVID + on 12/13/22.     # Acute kidney injury superimposed on CKD: worsened during sepsis s/p HD at [facility], now off HD, Cr in ~5s with CKD 5 nearing ESRD  - [facility] renal duplex with severe stenosis of the left renal artery; vascular consulted, no plan for intervention for HESHAM  - c/w sevelamer 800 mg TID, bicarb 1300 4x per day per renal, calcitriol 0.25 for secondary hyperparathyroidism  - AVF placed by vascular on 1/5, OP f/u     #Chronic systolic heart failure.   - trops elevated on admission 210->199, also elevated at OSH   - EKG with T wave flattening in inferolateral leads, consistent with prior study   - TTE showing new mild global LV dysfunction   - NST positive with inferior wall motion abnormality  - cath on 1/4 nonobstructive CAD, no intervention  - should me on DMT however ace/arb contraindicated in setting of CKD5 not on HD at risk for hyperkalemia also not taking any meds that are prescribed despite encouragement.    # Esophageal stricture + Esophagitis: Hx of benign appearing esophageal stricture s/p stent on 4/2022 by Dr. Erickson   - GI consulted now s/p stent removal 12/16, path neg h pylori, some metaplasia  - per GI c/w Protonix 40 mg BID  - tolerating diet.    # Lung abscess.:Septic shock due to pseudomonal PNA with lung abscess, morganella bacteremia and kelbsiella UTI.  Was on Zosyn since 11/25, as per ID in [facility] who recommended 4 week course of antibiotics versus possibly longer pending imaging at 4 weeks (12/23)  - CXR 12/23 - Stable RUL lesion, with new RLL pneumonia   - ID recommending to continue antibiotics for at least 2 more weeks (through 1/6) to c/w zosyn while inpatient, per ID can transition to PO Ceftin when ready for discharge; will f/u re: completion of abx re: need for repeat imaging.    # Chronic Pancreatitis: previously on Creon, will resume given likely exocrine insufficiency with malabsorption given BMI of 17.   - c/w Creon, refusing, educated on importance   - home oxycodone 15mg TID, confirmed in iSTOP--now on 7.5 mg prn.    # 2019 novel coronavirus disease (COVID-19) COVID + on 12/13, 12/29, neg 1/3  - s/p 10d, no isolation needed     # History of ETOH abuse.   - c/w folic acid (def 4.4 in 12/21), thiamine, MV.    # Hepatitis C: s/p treatment  - HCV RNA not detected on 11/27/22.    severe protein calorie malnutrition per nutrition colby rowan CARMEN   62M with esophageal stricture s/p stent on 4/2022 by Dr. Hernandez, Emphysema, HCV s/p treatment w/ SVR, alcohol misuse c/b chronic pancreatitis transferred from [facility] for esophageal stent removal due to intractable n/v. Pt was admitted to [facility] on 11/25 for AMS due to septic shock secondary to psuedomonal PNA with lung abscess, morganella bacteremia and kelbsiella UTI. Pt was initially intubated for acute respiratory failure and required pressors for hypotension . Pt's hospital course was complicated by YUNIEL due to septic ATN requiring 3 rounds of HD. Pt developed intractable N/V during his hospital stay and was transferred to [facility] for esophageal stent removal by Dr. Erickson.  Pt was also found to be COVID + on 12/13/22.     # Acute kidney injury superimposed on CKD: worsened during sepsis s/p HD at [facility] VS, now off HD, Cr in ~5s with CKD 5 nearing ESRD  - [facility] renal duplex with severe stenosis of the left renal artery; vascular consulted, no plan for intervention for HESHAM  - c/w sevelamer 800 mg TID, bicarb 1300 4x per day per renal, calcitriol 0.25 for secondary hyperparathyroidism  - AVF placed by vascular on 1/5, OP f/u     #Chronic systolic heart failure.   - trops elevated on admission 210->199, also elevated at OSH   - EKG with T wave flattening in inferolateral leads, consistent with prior study   - TTE showing new mild global LV dysfunction   - NST positive with inferior wall motion abnormality  - cath on 1/4 nonobstructive CAD, no intervention  - should me on DMT however ace/arb contraindicated in setting of CKD5 not on HD at risk for hyperkalemia also not taking any meds that are prescribed despite encouragement.    # Esophageal stricture + Esophagitis: Hx of benign appearing esophageal stricture s/p stent on 4/2022 by Dr. Ericksno; now s/p stent removal 12/16, path neg h pylori, some metaplasia  - per GI c/w Protonix 40 mg BID    # Lung abscess.:Septic shock due to pseudomonal PNA with lung abscess, morganella bacteremia and kelbsiella UTI. CT chest which showed a cavitary pneumonia RUL.  ETT sputum grew pseudomonas. He was treated with and continues on Zosyn.  CT repeated 12/5 showed interval decrease in size of cavitary consolidation, measuring 3.8 cm x 2.1 cm x 3.1 cm.  - zosyn until 11/25-1/6    # Chronic Pancreatitis: previously on Creon, will resume given likely exocrine insufficiency with malabsorption given BMI of 17; c/w Creon, refusing, educated on importance, oxycodone 7.5 mg prn.    # 2019 novel coronavirus disease (COVID-19) COVID + on 12/13, 12/29, neg 1/3, s/p 10d, no isolation needed     # History of ETOH abuse.: folic acid (def 4.4 in 12/21), thiamine, MV.    # Hepatitis C: s/p treatment:  HCV RNA not detected on 11/27/22.    DC CARMEN         62M hx benign esophageal stricture s/p stent placement @ [facility] 4/2022 by Dr [facility]uddleston [facility]  w/ septic shock due to lung abscess presumed pseudomonal s/p 6 wks zosyn ended on 1/6 also c/b morganella bacteremia  and YUNIEL required HD (now off HD) and MICU stay, sent here for intractable N/V and stent removal. Endoscopy here sig for esophagitis and gastic ulcer, stent removed.  Started on PPI BID per GI recs. Hospital course c/b intermittent chest pain, EKG unchanged from prior, TTE showed new global LV dysfunction. Cards consulted. NST sig for inferior wall motion abnormalitiy. Now s/p cath 1/4 w/ non-obstructive CAD. Hospital course further c/b diarrhea likely 2/2 pancreatic insuffiencey , also non-adherent with medications including Creon.  Renal onboard for worsening renal function, recommended no indication for dialysis at this time but requested AVF placement, performed 1/5. Also found to be COVID + during course and remains asymptomatic, saturating well on RA. Evaluated by PT recommended CARMEN, pt however refusing. Hemodynamically stable for discharge to follow up with GI, Renal, cards outpatient. 62M hx benign esophageal stricture s/p stent placement @ [facility] 4/2022 by Dr [facility]uddleston [facility]  w/ septic shock due to lung abscess presumed pseudomonal s/p 6 wks zosyn ended on 1/6 also c/b morganella bacteremia  and YUNIEL required HD (now off HD) and MICU stay, sent here for intractable N/V and stent removal. Endoscopy here sig for esophagitis and gastic ulcer, stent removed.  Started on PPI BID per GI recs. Hospital course c/b intermittent chest pain, EKG unchanged from prior, TTE showed new global LV dysfunction. NST sig for inferior wall motion abnormalitiy. Now s/p cath 1/4 w/ non-obstructive CAD. Hospital course further c/b diarrhea likely 2/2 pancreatic insuffiencey , also non-adherent with medications including Creon.  Renal onboard for worsening renal function, recommended no indication for dialysis at this time but requested AVF placement, placed on 1/5. Also found to be COVID + during course and remains asymptomatic. He was found to have worsening Right toe wound, imaging was negative for OM, podiatry managed wound care. Angiogram done by vascular surgery on 1/20 which showed _________. Evaluated by PT recommended CARMEN. Hemodynamically stable for discharge to follow up with GI, Renal, cards outpatient. 62M hx benign esophageal stricture s/p stent placement @ [facility] 4/2022 by Dr [facility]udd[facility]eston [facility]  w/ septic shock due to lung abscess presumed pseudomonal s/p 6 wks zosyn ended on 1/6 also c/b morganella bacteremia  and YUNIEL required HD (now off HD) and MICU stay, sent here for intractable N/V and stent removal. Endoscopy here sig for esophagitis and gastic ulcer, stent removed.  Started on PPI BID per GI recs. Hospital course c/b intermittent chest pain, EKG unchanged from prior, TTE showed new global LV dysfunction. NST sig for inferior wall motion abnormalitiy. Now s/p cath 1/4 w/ non-obstructive CAD. Hospital course further c/b diarrhea likely 2/2 pancreatic insuffiencey , also non-adherent with medications including Creon.  Renal onboard for worsening renal function, recommended no indication for dialysis at this time but requested AVF placement, placed on 1/5. Also found to be COVID + during course and remains asymptomatic. He was found to have worsening Right toe wound, imaging was negative for OM, podiatry managed wound care. Angiogram done by vascular surgery on 1/20 which showed PT plasty. Plan for pop-PT bypass on 1/26. Evaluated by PT recommended CARMEN. Hemodynamically stable for discharge to follow up with GI, Renal, cards outpatient. 62M hx benign esophageal stricture s/p stent placement @ [facility] 4/2022 by Dr [facility]eston [facility]  w/ septic shock due to lung abscess presumed pseudomonal s/p 6 wks zosyn ended on 1/6 also c/b morganella bacteremia  and YUNIEL required HD (now off HD) and MICU stay, sent here for intractable N/V and stent removal. Endoscopy here sig for esophagitis and gastic ulcer, stent removed.  Started on PPI BID per GI recs. Hospital course c/b intermittent chest pain, EKG unchanged from prior, TTE showed new global LV dysfunction. NST sig for inferior wall motion abnormalitiy. Now s/p cath 1/4 w/ non-obstructive CAD. Hospital course further c/b diarrhea likely 2/2 pancreatic insuffiencey , also non-adherent with medications including Creon.  Renal onboard for worsening renal function, recommended no indication for dialysis at this time but requested AVF placement, placed on 1/5. Also found to be COVID + during course and remains asymptomatic. He was found to have worsening Right toe wound, imaging was negative for OM, podiatry managed wound care. Angiogram done by vascular surgery on 1/20 with repeat baboon angioplasty on 2/3 with restoration of flow. His hospital course was further complicated by RUE DVT complicating his vascular access, he was started on heparin and transitioned to apixaban. He subsequently also had his stage 2 revision of AVF 2/9.  Evaluated by PT recommended CARMEN. Hemodynamically stable for discharge to follow up with GI, Renal, cards outpatient. 62M hx benign esophageal stricture s/p stent placement @ [facility] 4/2022 by Dr [facility]  w/ septic shock due to lung abscess presumed pseudomonal s/p 6 wks zosyn ended on 1/6 also c/b morganella bacteremia  and YUNIEL required HD (now off HD) and MICU stay, sent here for intractable N/V and stent removal. Endoscopy here sig for esophagitis and gastic ulcer, stent removed.  Started on PPI BID per GI recs. Hospital course c/b intermittent chest pain, EKG unchanged from prior, TTE showed new global LV dysfunction. NST sig for inferior wall motion abnormalitiy. Now s/p cath 1/4 w/ non-obstructive CAD. Hospital course further c/b diarrhea likely 2/2 pancreatic insuffiencey , also non-adherent with medications including Creon.  Renal onboard for worsening renal function, recommended no indication for dialysis at this time but requested AVF placement, placed on 1/5. Also found to be COVID + during course and remains asymptomatic. He was found to have worsening Right toe wound, imaging was negative for OM, podiatry managed wound care. Angiogram done by vascular surgery on 1/20 with repeat baboon angioplasty on 2/3 with restoration of flow. His hospital course was further complicated by RUE DVT complicating his vascular access, he was started on heparin and transitioned to apixaban. He subsequently also had his stage 2 revision of AVF 2/9, but will continue to receive dialysis through tunneled catheter for the next few weeks while the fistula matures.  Patient is recommended [facility] saundra ongoing wound care and complex medical care. Hemodynamically stable for discharge to [facility] with follow up with GI, Renal, cards, vascular as outpatient.

## 2023-01-05 NOTE — PROGRESS NOTE ADULT - PROBLEM SELECTOR PLAN 1
Pt with YUNIEL on CKD likely multifactorial in the setting of recent COVID infection, poor oral intake. Scr was elevated at 2.20 in September 2022. Pt was recently admitted at [facility]. Scr on admission was significantly elevated at 8.94 on 11/25/22. Pt received 1st HD session on 11/26/22. Last HD session was done on 12/1/22. HD catheter was removed as kidney function was recovering. Scr was elevated at 5.28 on transfer from [facility] on 12/15/22. Scr remains elevated/stable at 5.40 today (1/5/23). No urgent indication for HD today. Of note kidney sonogram had discrepant size renal artery duplex showing HESHAM but BP well controlled. Discussed with Dr. Dukes (12/21) who knows the patient very well and has seen him inpatient and outpatient over the last several years. As per Dr. Hanks the patient's baseline creatinine is 3 and has advanced CKD and is nearing ESKD. Pt underwent cardiac cath on 1/4/23 , tolerated procedure well. Patient planned for AVF creation today. Currently receiving NS. Agree with IVFs. Monitor labs and urine output. Avoid any potential nephrotoxins. Dose medications as per eGFR.

## 2023-01-05 NOTE — DISCHARGE NOTE PROVIDER - NSDCFUSCHEDAPPT_GEN_ALL_CORE_FT
Gene Fraga  [facility]  NEPHRO OP [id number]  Scheduled Appointment: 01/25/2023    Tyson Urbano  [facility]  GASTRO OP [id number]  Scheduled Appointment: 01/26/2023

## 2023-01-05 NOTE — PROGRESS NOTE ADULT - SUBJECTIVE AND OBJECTIVE BOX
Assessment and Plan:   · Assessment	  62 year old male with Hx of esophageal stricture (s/p stent on 5/2022 by Dr. Hernandez?), Emphysema, HCV, chronic pancreatitis, presenting from [facility] for esophageal stent removal due to intractable n/v. Admitted to [facility] from 11/25-12/14 for ams 2/2 Septic shock due to psuedomonal PNA, Lung abscess, morganella bacteremia and kelbsiella UTI c/b ATN requiring HD and intractable n/v.      Problem/Plan - 1:  ·  Problem: Preop Risk Stratification  ·  Plan: - trops elevated on admission 210->199.  - EKG with T wave flattening in inferolateral leads.   - TTE showing new mild global LV dysfunction   - NM stress test with inferior wall ischemia  - cath with mild-mod disease, no intervention done  - elevated risk for mod risk surgery, no cardiac contraindication to proceed     Problem/Plan - 2:  ·  Problem: HTN (hypertension).   ·  Plan: BP slightly elevated, although intermittently refusing BP meds   renal dopplers show left sided HESHAM 60-99%, vascular not recommending intervention   continue with Nifedipine 30mg QD.    Problem/Plan - 3:  ·  Problem: Need for prophylactic measure.   ·  Plan: DVT ppx: Heparin subq   Diet: Regular diet, monitor to see if tolerating

## 2023-01-05 NOTE — DISCHARGE NOTE PROVIDER - DETAILS OF MALNUTRITION DIAGNOSIS/DIAGNOSES
This patient has been assessed with a concern for Malnutrition and was treated during this hospitalization for the following Nutrition diagnosis/diagnoses:     -  12/19/2022: Severe protein-calorie malnutrition   -  12/19/2022: Underweight (BMI < 19)

## 2023-01-05 NOTE — DISCHARGE NOTE PROVIDER - NSDCFUADDAPPT_GEN_ALL_CORE_FT
Please follow up with your primary care provider in 1-2 weeks following discharge from the hospital for continued monitoring and management. If you do not have a primary care provider please refer to the information for the internal medicine clinic to establish yourself as a patient.     Please follow up with your GI and renal providers for continued monitoring and management.

## 2023-01-05 NOTE — DISCHARGE NOTE PROVIDER - CARE PROVIDER_API CALL
Eron Hinds)  Gastroenterology; Internal Medicine  [address]  [place] [address]  Phone: [phone]  Fax: [phone]  Follow Up Time:     Dorita Oro)  Surgery; Vascular Surgery  [address], [address]  [place] [address]  Phone: [phone]  Fax: [phone]  Follow Up Time:    Dorita Oro)  Surgery; Vascular Surgery  [address], [address]  [place] [address]  Phone: [phone]  Fax: [phone]  Follow Up Time: 2 weeks    Talib Davison (DO)  Cardiovascular Disease; Internal Medicine; Nuclear Cardiology  [address] [facility], [address]  [place] [address]  Phone: [phone]  Fax: [phone]  Follow Up Time: 2 weeks    Nikhil Dalton)  Gastroenterology; Internal Medicine  [address], [address]  [place] [address]  Phone: [phone]  Fax: [phone]  Follow Up Time: 1 month

## 2023-01-05 NOTE — DISCHARGE NOTE PROVIDER - PROVIDER TOKENS
PROVIDER:[TOKEN:[[id number]]],PROVIDER:[TOKEN:[[id number]]] PROVIDER:[TOKEN:[[id number]],FOLLOWUP:[2 weeks]],PROVIDER:[TOKEN:[[id number]],FOLLOWUP:[2 weeks]],PROVIDER:[TOKEN:[[id number]],FOLLOWUP:[1 month]]

## 2023-01-05 NOTE — PROGRESS NOTE ADULT - SUBJECTIVE AND OBJECTIVE BOX
[facility] DIVISION OF KIDNEY DISEASES AND HYPERTENSION -- FOLLOW UP NOTE  --------------------------------------------------------------------------------  HPI: Pt is a 62-year-old Male with Hx of esophageal stricture (S/p stenting in 5/22), Emphysema, HCV, chronic pancreatitis who initially presented to [facility] on 11/25/22 for AMS. Pt was admitted to [facility] on 11/25 for AMS 2/2 Septic shock. Pt was initially intubated for acute respiratory failure and required pressors for hypotension. Pt's hospital course was complicated by YUNIEL/ATN requiring HD. Pt was extubated and was transferred to [facility] for esophageal stent removal by Dr. Erickson. Initial labs on this admission concerning for elevated Scr. Nephrology team following for YUNIEL on CKD.     On review of [facility], it was noted that Scr was elevated at 2.20 in September 2022. Scr on admission was significantly elevated at 8.94 on 11/25/22. Pt received 1st HD session on 11/26/22. Last HD session was done on 12/1/22. HD catheter was removed as kidney function was recovering. Scr was elevated at 5.28 on transfer from [facility] on 12/15/22. Scr remains elevated/stable at 5-6 range. Scr is elevated/stable at 5.40 today.     Pt. seen and examined this morning. Pt awake denies any chest pain, shortness of breath, or dizziness. Reports that diarrhea has resolved. Pt underwent cardiac cath on 1/4/23. Tolerated procedure well. Pt planned for AVF creation today.     PAST HISTORY  --------------------------------------------------------------------------------  No significant changes to PMH, PSH, FHx, SHx, unless otherwise noted    ALLERGIES & MEDICATIONS  --------------------------------------------------------------------------------  Allergies    Tylenol (Other)    Intolerances    Standing Inpatient Medications  calcitriol   Capsule 0.25 MICROGram(s) Oral daily  folic acid 1 milliGRAM(s) Oral daily  heparin   Injectable 5000 Unit(s) SubCutaneous every 8 hours  iron sucrose IVPB 200 milliGRAM(s) IV Intermittent <User Schedule>  multivitamin 1 Tablet(s) Oral daily  NIFEdipine XL 30 milliGRAM(s) Oral daily  pancrelipase  (CREON  6,000 Lipase Units) 1 Capsule(s) Oral three times a day with meals  pantoprazole    Tablet 40 milliGRAM(s) Oral before breakfast  pantoprazole    Tablet 40 milliGRAM(s) Oral <User Schedule>  piperacillin/tazobactam IVPB.. 3.375 Gram(s) IV Intermittent every 12 hours  sevelamer carbonate 800 milliGRAM(s) Oral three times a day  sodium bicarbonate 1300 milliGRAM(s) Oral four times a day  sodium chloride 0.9%. 1000 milliLiter(s) IV Continuous <Continuous>  sucralfate suspension 1 Gram(s) Oral every 6 hours  thiamine 100 milliGRAM(s) Oral daily    PRN Inpatient Medications  acetaminophen     Tablet .. 650 milliGRAM(s) Oral every 6 hours PRN  fentaNYL    Injectable 50 MICROGram(s) IV Push every 5 minutes PRN  fentaNYL    Injectable 25 MICROGram(s) IV Push every 5 minutes PRN  loperamide 2 milliGRAM(s) Oral three times a day PRN  oxyCODONE    IR 7.5 milliGRAM(s) Oral every 4 hours PRN    REVIEW OF SYSTEMS  --------------------------------------------------------------------------------  Gen: No fevers, See HPI   Respiratory: No dyspnea  CV: No chest pain  GI: No abdominal pain  : No dysuria  MSK: No  edema  Neuro: no dizziness   All other systems were reviewed and are negative, except as noted.    VITALS/PHYSICAL EXAM  --------------------------------------------------------------------------------  T(C): 36.7 (01-05-23 @ 10:34), Max: 36.9 (01-04-23 @ 23:00)  HR: 87 (01-05-23 @ 10:34) (87 - 98)  BP: 146/75 (01-05-23 @ 10:34) (132/73 - 158/84)  RR: 16 (01-05-23 @ 10:34) (16 - 18)  SpO2: 98% (01-05-23 @ 10:34) (98% - 100%)  Wt(kg): --  Height (cm): 182.9 (01-05-23 @ 10:27)  Weight (kg): 59.1 (01-05-23 @ 10:27)  BMI (kg/m2): 17.7 (01-05-23 @ 10:27)  BSA (m2): 1.78 (01-05-23 @ 10:27)    Physical Exam:  	Gen: ill appearing, frail  	HEENT: dry   	Pulm: CTA today  	CV: S1S2  	Abd: Soft, +BS   	Ext: No LE edema B/L  	Neuro: Awake and alert   	Skin: Warm and dry    LABS/STUDIES  --------------------------------------------------------------------------------              7.2    7.52  >-----------<  184      [01-05-23 @ 02:43]              23.4     138  |  113  |  64  ----------------------------<  112      [01-05-23 @ 02:43]  3.9   |  15  |  5.40        Ca     7.7     [01-05-23 @ 02:43]      Mg     1.90     [01-05-23 @ 02:43]      Phos  5.4     [01-05-23 @ 02:43]      PT/INR: PT 11.5 , INR 0.99       [01-05-23 @ 02:43]  PTT: 28.4       [01-05-23 @ 02:43]    Creatinine Trend:  SCr 5.40 [01-05 @ 02:43]  SCr 5.75 [01-04 @ 06:45]  SCr 5.84 [01-03 @ 06:12]  SCr 5.81 [12-30 @ 05:15]  SCr 5.94 [12-25 @ 06:34]    Urinalysis - [12-18-22 @ 00:40]      Color Light Yellow / Appearance Clear / SG 1.013 / pH 6.0      Gluc Negative / Ketone Negative  / Bili Negative / Urobili <2 mg/dL       Blood Trace / Protein 30 mg/dL / Leuk Est Negative / Nitrite Negative      RBC 2 / WBC 1 / Hyaline  / Gran  / Sq Epi  / Non Sq Epi 1 / Bacteria Negative    Iron 104, TIBC TNP, %sat --      [01-03-23 @ 06:12]  Ferritin 311      [01-03-23 @ 06:12]  PTH -- (Ca --)      [12-22-22 @ 06:40]   290  Vitamin D (25OH) 10.4      [01-04-23 @ 06:45]  HbA1c 5.0      [03-22-19 @ 09:03]  TSH 1.380      [09-01-22 @ 08:58]  Lipid: chol 147, TG 88, HDL 59, LDL --      [09-01-22 @ 08:58]    Free Light Chains: kappa 13.31, lambda 12.94, ratio = 1.03      [12-07 @ 05:35]  Immunofixation Serum: No Monoclonal Band Identified    Reference Range: None Detected      [12-07-22 @ 05:35]  SPEP Interpretation: Hypoalbuminemia      [12-07-22 @ 05:35]

## 2023-01-05 NOTE — PROGRESS NOTE ADULT - PROBLEM SELECTOR PLAN 1
S/p HD X 3  in ICU in [facility], Cr plateaued at 5.7-6, off HD now; per renal plan for AVF creation on this admission as per d/w OP nephrologist baseline Cr is 3 and nearing ESRD  - avoid nephrotoxic, renally dose meds, trend Cr  - VA renal duplex with severe stenosis of the left renal artery; vascular consulted, no plan for intervention for HESHAM  - c/w sevelamer 800 mg TID (refusing)  - add bicarb 1300 4x per day per renal (refusing)  - on calcitriol 0.25 for secondary hyperparathyroidism per renal  - for AVF today, medically optimized

## 2023-01-05 NOTE — CHART NOTE - NSCHARTNOTEFT_GEN_A_CORE
Post Operative Note  Patient: IMELDA ROBERTSON 62y (1960) Male   MRN: [id number]  Location: [address]  Visit: 12-15-22 Inpatient  Date: 01-05-23 @ 21:29    Procedure: S/P Creation of arteriovenous fistula for dialysis    Subjective: Patient seen and examined post operatively. Reports pain as controlled. Denies nausea, vomiting, fever, chills, chest pain, SOB, cough.      Objective:  Vitals: T(F): 97.4 (01-05-23 @ 18:30), Max: 98.4 (01-04-23 @ 23:00)  HR: 99 (01-05-23 @ 18:30)  BP: 101/60 (01-05-23 @ 18:30) (101/60 - 148/75)  RR: 18 (01-05-23 @ 18:30)  SpO2: 100% (01-05-23 @ 18:30)  Vent Settings:     In:   01-05-23 @ 07:01  -  01-05-23 @ 21:29  --------------------------------------------------------  IN: 120 mL      IV Fluids: calcitriol   Capsule 0.25 MICROGram(s) Oral daily  folic acid 1 milliGRAM(s) Oral daily  iron sucrose IVPB 200 milliGRAM(s) IV Intermittent <User Schedule>  multivitamin 1 Tablet(s) Oral daily  sodium bicarbonate 1300 milliGRAM(s) Oral four times a day  thiamine 100 milliGRAM(s) Oral daily      Out:   01-05-23 @ 07:01  -  01-05-23 @ 21:29  --------------------------------------------------------  OUT: 150 mL      EBL:     Voided Urine:   01-05-23 @ 07:01  -  01-05-23 @ 21:29  --------------------------------------------------------  OUT: 150 mL          Physical Examination:  General: NAD, resting comfortably in bed  HEENT: Normocephalic atraumatic  Respiratory: Nonlabored respirations, normal CW expansion.  Cardio: S1S2, regular rate and rhythm.  Abdomen: soft, NTND  Vascular: extremities are warm and well perfused. LUE basilic transposition thrill palpated, radial and ulnar pulse palpable distally.     Imaging:  No post-op imaging studies    Assessment:  62yMale patient S/P Creation of arteriovenous fistula for dialysis for ESRD access.  Patient is recovering appropriately on the floors.     Plan:  - IV Abx: Zosyn  - Pain control PRN  - Diet: Regular  - IVF  - DVT ppx: heparin    Date/Time: 01-05-23 @ 21:29

## 2023-01-05 NOTE — PROGRESS NOTE ADULT - SUBJECTIVE AND OBJECTIVE BOX
Surgery Progress Note    INTERVAL EVENTS:   No acute events overnight.    SUBJECTIVE: Patient seen and examined at bedside with surgical team,     OBJECTIVE:    Vital Signs Last 24 Hrs  T(C): 36.8 (05 Jan 2023 06:53), Max: 37.1 (04 Jan 2023 09:25)  T(F): 98.2 (05 Jan 2023 06:53), Max: 98.8 (04 Jan 2023 09:25)  HR: 93 (05 Jan 2023 06:53) (91 - 98)  BP: 136/77 (05 Jan 2023 06:53) (129/71 - 158/84)  BP(mean): --  RR: 17 (05 Jan 2023 06:53) (17 - 18)  SpO2: 100% (05 Jan 2023 06:53) (100% - 100%)    Parameters below as of 05 Jan 2023 06:53  Patient On (Oxygen Delivery Method): room air    I&O's Detail  MEDICATIONS  (STANDING):  calcitriol   Capsule 0.25 MICROGram(s) Oral daily  folic acid 1 milliGRAM(s) Oral daily  heparin   Injectable 5000 Unit(s) SubCutaneous every 8 hours  iron sucrose IVPB 200 milliGRAM(s) IV Intermittent <User Schedule>  multivitamin 1 Tablet(s) Oral daily  NIFEdipine XL 30 milliGRAM(s) Oral daily  pancrelipase  (CREON  6,000 Lipase Units) 1 Capsule(s) Oral three times a day with meals  pantoprazole    Tablet 40 milliGRAM(s) Oral before breakfast  pantoprazole    Tablet 40 milliGRAM(s) Oral <User Schedule>  piperacillin/tazobactam IVPB.. 3.375 Gram(s) IV Intermittent every 12 hours  sevelamer carbonate 800 milliGRAM(s) Oral three times a day  sodium bicarbonate 1300 milliGRAM(s) Oral four times a day  sodium chloride 0.9%. 1000 milliLiter(s) (60 mL/Hr) IV Continuous <Continuous>  sucralfate suspension 1 Gram(s) Oral every 6 hours  thiamine 100 milliGRAM(s) Oral daily    MEDICATIONS  (PRN):  acetaminophen     Tablet .. 650 milliGRAM(s) Oral every 6 hours PRN Mild Pain (1 - 3), Moderate Pain (4 - 6)  loperamide 2 milliGRAM(s) Oral three times a day PRN Diarrhea  oxyCODONE    IR 7.5 milliGRAM(s) Oral every 4 hours PRN mod-severe pain      PHYSICAL EXAM:    Cardio: RRR  Resp: Good effort, CTA b/l  Thorax: No chest wall tenderness  Vascular: All 4 extremities warm. b/l UE with palpable strong radial and ulnar pulse.   Skin: Intact, no breakdown  Musculoskeletal: All 4 extremities moving spontaneously, no limitations    LABS:                        7.2    7.52  )-----------( 184      ( 05 Jan 2023 02:43 )             23.4     01-05    138  |  113<H>  |  64<H>  ----------------------------<  112<H>  3.9   |  15<L>  |  5.40<H>    Ca    7.7<L>      05 Jan 2023 02:43  Phos  5.4     01-05  Mg     1.90     01-05      PT/INR - ( 05 Jan 2023 02:43 )   PT: 11.5 sec;   INR: 0.99 ratio         PTT - ( 05 Jan 2023 02:43 )  PTT:28.4 sec      ABO Interpretation: ZAYRA (01-05-23 @ 02:58)      IMAGING:

## 2023-01-05 NOTE — PROGRESS NOTE ADULT - PROBLEM SELECTOR PLAN 5
Septic shock due to pseudomonal PNA with lung abscess, morganella bacteremia and kelbsiella UTI.  Was on Zosyn since 11/25, as per ID in [facility] who recommended 4 week course of antibiotics versus possibly longer pending imaging at 4 weeks (12/23)  - CXR 12/23 - Stable RUL lesion, with new RLL pneumonia   - ID recommending to continue antibiotics for at least 2 more weeks (through 1/6) to c/w zosyn while inpatient, per ID can transition to PO Ceftin when ready for discharge; will f/u re: completion of abx re: need for repeat imaging

## 2023-01-05 NOTE — PROGRESS NOTE ADULT - ASSESSMENT
62 M with YUNIEL on advanced CKD nearing ESRD, intermittent HD with HD catheter in the past 2 years.   Vascular surgery is consulted for AVF planning.     Plan:   - OR today for AVF creation.   - cards clearance documented  - Consent in chart.     Vascular Surgery  #[id number]

## 2023-01-05 NOTE — PROGRESS NOTE ADULT - SUBJECTIVE AND OBJECTIVE BOX
VASCULAR SURGERY DAILY PROGRESS NOTE:     Pt well this am.  VSS and afebrile.  Pt HD stable   Patient otherwise denies nausea, vomiting, chest pain, shortness of breath     OBJECTIVE:  Vital Signs Last 24 Hrs  T(C): 36.8 (05 Jan 2023 06:53), Max: 37.1 (04 Jan 2023 09:25)  T(F): 98.2 (05 Jan 2023 06:53), Max: 98.8 (04 Jan 2023 09:25)  HR: 93 (05 Jan 2023 06:53) (91 - 98)  BP: 136/77 (05 Jan 2023 06:53) (129/71 - 158/84)  BP(mean): --  RR: 17 (05 Jan 2023 06:53) (17 - 18)  SpO2: 100% (05 Jan 2023 06:53) (100% - 100%)    Parameters below as of 05 Jan 2023 06:53  Patient On (Oxygen Delivery Method): room air    Labs:                        7.2    7.52  )-----------( 184      ( 05 Jan 2023 02:43 )             23.4     01-05    138  |  113<H>  |  64<H>  ----------------------------<  112<H>  3.9   |  15<L>  |  5.40<H>    Ca    7.7<L>      05 Jan 2023 02:43  Phos  5.4     01-05  Mg     1.90     01-05      PT/INR - ( 05 Jan 2023 02:43 )   PT: 11.5 sec;   INR: 0.99 ratio         PTT - ( 05 Jan 2023 02:43 )  PTT:28.4 sec              Physical Exam:  General: NAD, Lying in bed in no acute distress  Neuro: A+Ox3  Resp: non labored with good effort   Vascular: All 4 extremities warm. b/l UE with palpable strong radial and ulnar pulse.   Musculoskeletal: All 4 extremities moving spontaneously, no limitations

## 2023-01-05 NOTE — PROGRESS NOTE ADULT - PROBLEM SELECTOR PLAN 4
Multiple episodes of diarrhea on antibiotics. Also with history of chronic pancreatitis which may be contributing. On Creon but refusing which is likely why he is having diarrhea. Pt. continues to refuse Creon despite counseling.   - dc bowel regimen, stool count, per d/w RN no estella diarrhea

## 2023-01-06 NOTE — PROGRESS NOTE ADULT - SUBJECTIVE AND OBJECTIVE BOX
VASCULAR SURGERY DAILY PROGRESS NOTE:     SUBJECTIVE/ROS:     Overnight: no acute events   Patient seen and evaluated on AM rounds.  Now POD 1 LUE brachiobasilic AVF.  Doing well with some pain in LUE but no acute complaints otherwise   Patient otherwise denies nausea, vomiting, chest pain, shortness of breath     OBJECTIVE:  Vital Signs Last 24 Hrs  T(C): 36.6 (06 Jan 2023 06:29), Max: 36.8 (05 Jan 2023 09:55)  T(F): 97.9 (06 Jan 2023 06:29), Max: 98.3 (05 Jan 2023 17:00)  HR: 88 (06 Jan 2023 06:29) (87 - 99)  BP: 137/65 (06 Jan 2023 06:29) (101/60 - 148/75)  BP(mean): 83 (05 Jan 2023 17:00) (83 - 97)  RR: 18 (06 Jan 2023 06:29) (12 - 18)  SpO2: 100% (06 Jan 2023 06:29) (96% - 100%)    Parameters below as of 06 Jan 2023 06:29  Patient On (Oxygen Delivery Method): room air      I&O's Detail    05 Jan 2023 07:01  -  06 Jan 2023 07:00  --------------------------------------------------------  IN:    sodium chloride 0.9%: 120 mL  Total IN: 120 mL    OUT:    Voided (mL): 150 mL  Total OUT: 150 mL    Total NET: -30 mL      Labs:                        7.2    7.52  )-----------( 184      ( 05 Jan 2023 02:43 )             23.4     01-05    138  |  113<H>  |  64<H>  ----------------------------<  112<H>  3.9   |  15<L>  |  5.40<H>    Ca    7.7<L>      05 Jan 2023 02:43  Phos  5.4     01-05  Mg     1.90     01-05      PT/INR - ( 05 Jan 2023 02:43 )   PT: 11.5 sec;   INR: 0.99 ratio         PTT - ( 05 Jan 2023 02:43 )  PTT:28.4 sec              Physical Exam:  General: well developed, well nourished, NAD in bed  Cardiovascular: appears well perfused   Respiratory: respirations non labored  LUE: AVF site c/d/i; no active bleeding; no signs of swelling or hematoma; palpable thrill   Neurological: A+Ox3

## 2023-01-06 NOTE — PROGRESS NOTE ADULT - PROBLEM SELECTOR PLAN 6
Chronic pancreatitis; previously on Creon, will resume given likely exocrine insufficiency with malabsorption given BMI of 17.   - c/w Creon, refusing, educated on importance   - c/w xycodone 7.5 mg prn Chronic pancreatitis; previously on Creon, will resume given likely exocrine insufficiency with malabsorption given BMI of 17.   - c/w Creon, refusing, educated on importance   - c/w oxycodone 7.5 mg prn COVID + on 12/13, persistently positive  - on RA, no iso indicated

## 2023-01-06 NOTE — PROGRESS NOTE ADULT - PROBLEM SELECTOR PLAN 3
Pt. with anemia suspected in setting of renal failure.   iron studies and ferritin levels. [id number]. Hb below goal. Monitor Hb.  will start Iv iron Pt. with anemia suspected in setting of renal failure.   iron studies and ferritin levels. [id number]. Hb below goal. Monitor Hb.  we started  Iv iron

## 2023-01-06 NOTE — PROGRESS NOTE ADULT - PROBLEM SELECTOR PLAN 3
Hx of benign appearing esophageal stricture s/p stent on 4/2022 by Dr. Erickson   - GI consulted now s/p stent removal 12/16, path neg h pylori, some metaplasia  - per GI c/w Protonix 40 mg BID   - tolerating diet

## 2023-01-06 NOTE — PROGRESS NOTE ADULT - PROBLEM SELECTOR PLAN 4
Pt. with acidosis in setting of renal failure. Serum CO2 low at 15 today. Recommend starting Bicitra 15ml PO BID. Monitor SCO2.   refuses pills

## 2023-01-06 NOTE — PROGRESS NOTE ADULT - PROBLEM SELECTOR PLAN 10
BP slightly elevated, intermittently refusing BP meds   - renal dopplers show left sided HESHAM 60-99%, vascular not recommending intervention   - c/w Nifedipine 30mg QD SQH  severe protein calorie malnutrition per nutrition eval   pending CARMEN placement

## 2023-01-06 NOTE — PROGRESS NOTE ADULT - PROBLEM SELECTOR PLAN 1
Pt with YUNIEL on CKD likely multifactorial in the setting of recent COVID infection, poor oral intake. Scr was elevated at 2.20 in September 2022. Pt was recently admitted at [facility]. Scr on admission was significantly elevated at 8.94 on 11/25/22. Pt received 1st HD session on 11/26/22. Last HD session was done on 12/1/22. HD catheter was removed as kidney function was recovering. Scr was elevated at 5.28 on transfer from [facility] on 12/15/22. Scr remains elevated/stable at 5.40 today (1/5/23). No urgent indication for HD today. Of note kidney sonogram had discrepant size renal artery duplex showing HESHAM but BP well controlled. Discussed with Dr. Dukes (12/21) who knows the patient very well and has seen him inpatient and outpatient over the last several years. As per Dr. Hanks the patient's baseline creatinine is 3 and has advanced CKD and is nearing ESKD. Pt underwent cardiac cath on 1/4/23 , tolerated procedure well.  s/p AVF creation 1/5/2023   Monitor labs and urine output. Avoid any potential nephrotoxins. Dose medications as per eGFR.  needs pain meds

## 2023-01-06 NOTE — PROGRESS NOTE ADULT - PROBLEM SELECTOR PLAN 9
s/p treatment  - HCV RNA not detected on 11/27/22 BP slightly elevated, intermittently refusing BP meds   - renal dopplers show left sided HESHAM 60-99%, vascular not recommending intervention   - c/w Nifedipine 30mg QD

## 2023-01-06 NOTE — PROGRESS NOTE ADULT - SUBJECTIVE AND OBJECTIVE BOX
[facility] DIVISION OF KIDNEY DISEASES AND HYPERTENSION -- HEMODIALYSIS NOTE   Nehrology Office [phone]  available on [employer] teams--> Tamy Baird   --------------------------------------------------------------------------------  Chief Complaint: ESRD/Ongoing hemodialysis requirement  c/o pain at Left AVF   24 hour events/subjective:      ALLERGIES & MEDICATIONS  --------------------------------------------------------------------------------  Allergies    Tylenol (Other)    Intolerances      Standing Inpatient Medications  calcitriol   Capsule 0.25 MICROGram(s) Oral daily  folic acid 1 milliGRAM(s) Oral daily  heparin   Injectable 5000 Unit(s) SubCutaneous every 8 hours  iron sucrose IVPB 200 milliGRAM(s) IV Intermittent <User Schedule>  multivitamin 1 Tablet(s) Oral daily  NIFEdipine XL 30 milliGRAM(s) Oral daily  pancrelipase  (CREON  6,000 Lipase Units) 1 Capsule(s) Oral three times a day with meals  pantoprazole    Tablet 40 milliGRAM(s) Oral before breakfast  pantoprazole    Tablet 40 milliGRAM(s) Oral <User Schedule>  piperacillin/tazobactam IVPB.. 3.375 Gram(s) IV Intermittent every 12 hours  sevelamer carbonate 800 milliGRAM(s) Oral three times a day  sodium bicarbonate 1300 milliGRAM(s) Oral four times a day  thiamine 100 milliGRAM(s) Oral daily    PRN Inpatient Medications  acetaminophen     Tablet .. 650 milliGRAM(s) Oral every 6 hours PRN  loperamide 2 milliGRAM(s) Oral three times a day PRN  oxyCODONE    IR 7.5 milliGRAM(s) Oral every 4 hours PRN  oxyCODONE    IR 7.5 milliGRAM(s) Oral once PRN      REVIEW OF SYSTEMS  --------------------------------------------------------------------------------  Gen: No  fevers/chills  Skin: No rashes  Respiratory: no SOB  CV: No chest pain,   GI: No abdominal pain  :  -oliguria   MSK: No joint pain/   -swelling;   All other systems were reviewed and are negative, except as noted.    VITALS/PHYSICAL EXAM  --------------------------------------------------------------------------------  T(C): 36.6 (01-06-23 @ 06:29), Max: 36.8 (01-05-23 @ 17:00)  HR: 88 (01-06-23 @ 06:29) (88 - 99)  BP: 137/65 (01-06-23 @ 06:29) (101/60 - 143/83)  RR: 18 (01-06-23 @ 06:29) (12 - 18)  SpO2: 100% (01-06-23 @ 06:29) (96% - 100%)  Wt(kg): --  Height (cm): 182.9 (01-05-23 @ 10:27)  Weight (kg): 59.1 (01-05-23 @ 10:27)  BMI (kg/m2): 17.7 (01-05-23 @ 10:27)  BSA (m2): 1.78 (01-05-23 @ 10:27)      01-05-23 @ 07:01  -  01-06-23 @ 07:00  --------------------------------------------------------  IN: 120 mL / OUT: 150 mL / NET: -30 mL      Physical Exam:  	Gen NAD  	HEENT: no JVD  	Pulm: CTABL  	CV: S1S2,  	Abd: Soft,   	Ext:   - edema B/L LE   	Neuro: Awake and alert  	Skin: Warm and dry          Vascular: , AVF + bruit    LABS/STUDIES  --------------------------------------------------------------------------------              7.2    7.52  >-----------<  184      [01-05-23 @ 02:43]              23.4     138  |  113  |  64  ----------------------------<  112      [01-05-23 @ 02:43]  3.9   |  15  |  5.40        Ca     7.7     [01-05-23 @ 02:43]      Mg     1.90     [01-05-23 @ 02:43]      Phos  5.4     [01-05-23 @ 02:43]      PT/INR: PT 11.5 , INR 0.99       [01-05-23 @ 02:43]  PTT: 28.4       [01-05-23 @ 02:43]      Iron 104, TIBC TNP, %sat --      [01-03-23 @ 06:12]  Ferritin 311      [01-03-23 @ 06:12]  PTH -- (Ca --)      [12-22-22 @ 06:40]   290  Vitamin D (25OH) 10.4      [01-04-23 @ 06:45]  HbA1c 5.0      [03-22-19 @ 09:03]  TSH 1.380      [09-01-22 @ 08:58]  Lipid: chol 147, TG 88, HDL 59, LDL --      [09-01-22 @ 08:58]

## 2023-01-06 NOTE — PROGRESS NOTE ADULT - ASSESSMENT
62M EtoH misuse c/b chronic pancreatitis, HCV s/p treatment (SVR), emphysema, benign esophageal stricture (s/p stent on 4/2022) transferred [facility] for esophageal stent removal due to intractable n/v. Had been at [facility] from 11/25-12/14 for ams 2/2 septic shock due to pseudomonal PNA, lung abscess, Morganella bacteremia and Klebsiella UTI complicated by ATN requiring HD. Now off HD, clinically stable, given progressing to ESRD soon patient is s/p AVF on 1/5 s/p cardiac eval with cath on 1/4.  Now medically ready for CARMEN given weakness/inability walk/deconditioning.

## 2023-01-06 NOTE — PROGRESS NOTE ADULT - ASSESSMENT
62 M with YUNIEL on advanced CKD nearing ESRD, intermittent HD with HD catheter in the past 2 years.   Now POD 1 LUE AVF    Plan:   - AVF needs to mature before use   - Pain control PRN  - Care per primary team     Vascular Surgery  #[id number]

## 2023-01-06 NOTE — PROGRESS NOTE ADULT - PROBLEM SELECTOR PLAN 5
Septic shock due to pseudomonal PNA with lung abscess, morganella bacteremia and kelbsiella UTI.  Was on Zosyn since 11/25, as per ID in [facility] who recommended 4 week course of antibiotics versus possibly longer pending imaging at 4 weeks (12/23)  - CXR 12/23 - Stable RUL lesion, with new RLL pneumonia   - ID recommending to continue  through 1/6 to c/w zosyn while inpatient, per ID can transition to PO Ceftin when ready for discharge Septic shock due to pseudomonal PNA with lung abscess, morganella bacteremia and kelbsiella UTI.  Was on Zosyn since 11/25, as per ID in [facility] who recommended 4 week course of antibiotics versus possibly longer pending imaging at 4 weeks (12/23)  - CXR 12/23 - Stable RUL lesion, with new RLL pneumonia   - ID recommending to continue  through 1/6 to c/w zosyn while inpatient, per ID can transition to PO Ceftin when ready for discharge, per d/w ID no further imaging as 6 wks abx sufficient treatment Chronic pancreatitis; previously on Creon, will resume given likely exocrine insufficiency with malabsorption given BMI of 17.   - c/w Creon, refusing, educated on importance   - c/w oxycodone 7.5 mg prn

## 2023-01-06 NOTE — PROGRESS NOTE ADULT - PROBLEM SELECTOR PLAN 1
S/p HD X 3  in ICU in [facility], Cr plateaued at 5.7-6, off HD now; per renal plan for AVF creation on this admission as per d/w OP nephrologist baseline Cr is 3 and nearing ESRD  - avoid nephrotoxic, renally dose meds, trend Cr  - [facility] renal duplex with severe stenosis of the left renal artery; vascular consulted, no plan for intervention for HESHAM  - c/w sevelamer 800 mg TID & bicarb 1300 4x per day per renal (refusing)  - on calcitriol 0.25 for secondary hyperparathyroidism per renal  - s/p AVF w/ vascular on 1/5 to the L arm S/p HD X 3  in ICU in [facility], Cr plateaued at 5.7-6, off HD now; per renal plan for AVF creation on this admission as per d/w OP nephrologist baseline Cr is 3 and nearing ESRD  - avoid nephrotoxic, renally dose meds, trend Cr  - [facility] renal duplex with severe stenosis of the left renal artery; vascular consulted, no plan for intervention for HESHAM  - sevelamer 800 mg TID & bicarb 650 mg TID  - calcitriol 0.25 for secondary hyperparathyroidism per renal  - s/p AVF w/ vascular on 1/5 to the L arm

## 2023-01-06 NOTE — PROGRESS NOTE ADULT - PROBLEM SELECTOR PLAN 4
Multiple episodes of diarrhea on antibiotics. Also with history of chronic pancreatitis which may be contributing. On Creon but refusing which is likely why he is having diarrhea. Pt. continues to refuse Creon despite counseling.   - dc bowel regimen, stool count  - per d/w RN on 1/4 and 1/5, no estella diarrhea Septic shock due to pseudomonal PNA with lung abscess, morganella bacteremia and kelbsiella UTI.  Was on Zosyn since 11/25, as per ID in [facility] who recommended 4 week course of antibiotics versus possibly longer pending imaging at 4 weeks (12/23)  - CXR 12/23 - Stable RUL lesion, with new RLL pneumonia   - seen by ID  - completes 6 wks zosyn 1/6  - per d/w ID no further imaging as 6 wks abx sufficient treatment

## 2023-01-06 NOTE — PROGRESS NOTE ADULT - SUBJECTIVE AND OBJECTIVE BOX
Patient is a 62y old  Male who presents with a chief complaint of intractable N/V , esophageal stent removal    SUBJECTIVE / OVERNIGHT EVENTS:    No CP, SOB, f/c/n/v  Reports wants to order a different breakfast  some pain at AVF  advised medically ready for dc to [facility],  states does not want to go to [facility] because "last time I went they would not let me leave"  advised given he admits he can not walk and used to walk in Nov prior to illness and that lives with 88 yo mother that safest most reasonable thing is to go to [facility] unless he can arrange for family to care for him     MEDICATIONS  (STANDING):  calcitriol   Capsule 0.25 MICROGram(s) Oral daily  folic acid 1 milliGRAM(s) Oral daily  heparin   Injectable 5000 Unit(s) SubCutaneous every 8 hours  iron sucrose IVPB 200 milliGRAM(s) IV Intermittent <User Schedule>  multivitamin 1 Tablet(s) Oral daily  NIFEdipine XL 30 milliGRAM(s) Oral daily  pancrelipase  (CREON  6,000 Lipase Units) 1 Capsule(s) Oral three times a day with meals  pantoprazole    Tablet 40 milliGRAM(s) Oral before breakfast  pantoprazole    Tablet 40 milliGRAM(s) Oral <User Schedule>  piperacillin/tazobactam IVPB.. 3.375 Gram(s) IV Intermittent every 12 hours  sevelamer carbonate 800 milliGRAM(s) Oral three times a day  sodium bicarbonate 1300 milliGRAM(s) Oral four times a day  thiamine 100 milliGRAM(s) Oral daily    MEDICATIONS  (PRN):  acetaminophen     Tablet .. 650 milliGRAM(s) Oral every 6 hours PRN Mild Pain (1 - 3), Moderate Pain (4 - 6)  loperamide 2 milliGRAM(s) Oral three times a day PRN Diarrhea  oxyCODONE    IR 7.5 milliGRAM(s) Oral every 4 hours PRN mod-severe pain      T(C): 36.6 (01-06-23 @ 06:29), Max: 36.8 (01-05-23 @ 17:00)  HR: 88 (01-06-23 @ 06:29) (87 - 99)  BP: 137/65 (01-06-23 @ 06:29) (101/60 - 146/75)  RR: 18 (01-06-23 @ 06:29) (12 - 18)  SpO2: 100% (01-06-23 @ 06:29) (96% - 100%)    I&O's Summary    05 Jan 2023 07:01  -  06 Jan 2023 07:00  --------------------------------------------------------  IN: 120 mL / OUT: 150 mL / NET: -30 mL    PHYSICAL EXAM:  GENERAL: NAD, thin, appears older than stated age   CHEST/LUNG: Clear to auscultation bilaterally; No wheeze  HEART: Regular rate and rhythm; No murmurs, rubs, or gallops  ABDOMEN: Soft, Nontender, Nondistended; Bowel sounds present  EXTREMITIES:   warm and well perfused, No clubbing, cyanosis, or edema; R elbow swelling in dependent area, ?IVF infiltation improved   PSYCH: AAOx3  NEUROLOGY: non-focal  L AVF dressing c/d/i    LABS:                        7.2    7.52  )-----------( 184      ( 05 Jan 2023 02:43 )             23.4     01-05    138  |  113<H>  |  64<H>  ----------------------------<  112<H>  3.9   |  15<L>  |  5.40<H>    Ca    7.7<L>      05 Jan 2023 02:43  Phos  5.4     01-05  Mg     1.90     01-05    PT/INR - ( 05 Jan 2023 02:43 )   PT: 11.5 sec;   INR: 0.99 ratio    PTT - ( 05 Jan 2023 02:43 )  PTT:28.4 sec    Consultant(s) Notes Reviewed:  vascular   Care Discussed with Consultants/Other Providers:

## 2023-01-06 NOTE — PROGRESS NOTE ADULT - PROBLEM SELECTOR PLAN 2
Intermittent epigastric pain  - trops elevated on admission 210->199, also elevated at OSH   - EKG with T wave flattening in inferolateral leads, consistent with prior study   - TTE showing new mild global LV dysfunction   - NST positive with inferior wall motion abnormality  - cath on 1/4 nonobstructive CAD, no intervention  - should me on DMT however ace/arb contraindicated in setting of CKD5 not on HD at risk for hyperkalemia also not taking any meds that are prescribed despite encouragement Intermittent CP; trops elevated on admission 210->199, also elevated at OSH; no DM, LDL 71 9/2022  - EKG with T wave flattening in inferolateral leads, consistent with prior study   - TTE showing new mild global LV dysfunction   - NST positive with inferior wall motion abnormality  - cath on 1/4 nonobstructive CAD, no intervention  - should me on DMT however ace/arb contraindicated in setting of CKD5 not on HD at risk for hyperkalemia also not taking any meds that are prescribed despite encouragement

## 2023-01-07 NOTE — PROGRESS NOTE ADULT - PROBLEM SELECTOR PLAN 5
Chronic pancreatitis; previously on Creon, will resume given likely exocrine insufficiency with malabsorption given BMI of 17.   - c/w Creon, refusing, educated on importance   - c/w oxycodone 7.5 mg prn

## 2023-01-07 NOTE — PROGRESS NOTE ADULT - PROBLEM SELECTOR PLAN 2
Intermittent CP; trops elevated on admission 210->199, also elevated at OSH; no DM, LDL 71 9/2022  - EKG with T wave flattening in inferolateral leads, consistent with prior study   - TTE showing new mild global LV dysfunction   - NST positive with inferior wall motion abnormality  - cath on 1/4 nonobstructive CAD, no intervention  - should me on DMT however ace/arb contraindicated in setting of CKD5 not on HD at risk for hyperkalemia also not taking any meds that are prescribed despite encouragement

## 2023-01-07 NOTE — PROGRESS NOTE ADULT - PROBLEM SELECTOR PLAN 3
Hx of benign appearing esophageal stricture s/p stent on 4/2022 by Dr. Erickson   - GI consulted now s/p stent removal 12/16, path neg h pylori, some metaplasia  - c/w Protonix 40 mg BID   - tolerating diet

## 2023-01-07 NOTE — PROGRESS NOTE ADULT - PROBLEM SELECTOR PLAN 9
BP slightly elevated, intermittently refusing BP meds   - renal dopplers show left sided HESHAM 60-99%, vascular not recommending intervention   - c/w Nifedipine 30mg QD

## 2023-01-07 NOTE — PROGRESS NOTE ADULT - PROBLEM SELECTOR PLAN 4
Septic shock due to pseudomonal PNA with lung abscess, morganella bacteremia and kelbsiella UTI.  Was [facility]25[facility] antibiotics versus possibly longer pending imaging at 4 weeks (12/23)  - CXR 12/23 - Stable RUL lesion, with new RLL pneumonia   - seen by ID  - completed 6 wks zosyn 1/6  - per d/w ID no further imaging as 6 wks abx sufficient treatment

## 2023-01-07 NOTE — PROGRESS NOTE ADULT - SUBJECTIVE AND OBJECTIVE BOX
Patient is a 62y old  Male who presents with a chief complaint of intractable N/V , esophageal stent removal    SUBJECTIVE / OVERNIGHT EVENTS:    Does not want his meds, only pain meds, advised he needs to take his meds, states they make his stomach hurt, advised unlikely that all meds make his stomach heart the PPI and bicarb should actually help etc.  Patient does not want to hear it.  Spits up fluid often.  No overnight events or other complaints.     MEDICATIONS  (STANDING):  calcitriol   Capsule 0.25 MICROGram(s) Oral daily  citric acid/sodium citrate Solution 15 milliLiter(s) Oral two times a day  epoetin mejia-epbx (RETACRIT) Injectable 08049 Unit(s) SubCutaneous <User Schedule>  folic acid 1 milliGRAM(s) Oral daily  heparin   Injectable 5000 Unit(s) SubCutaneous every 8 hours  iron sucrose IVPB 200 milliGRAM(s) IV Intermittent <User Schedule>  multivitamin 1 Tablet(s) Oral daily  NIFEdipine XL 30 milliGRAM(s) Oral daily  pancrelipase  (CREON  6,000 Lipase Units) 1 Capsule(s) Oral three times a day with meals  pantoprazole    Tablet 40 milliGRAM(s) Oral before breakfast  pantoprazole    Tablet 40 milliGRAM(s) Oral <User Schedule>  sevelamer carbonate 800 milliGRAM(s) Oral three times a day  sodium bicarbonate 650 milliGRAM(s) Oral three times a day  thiamine 100 milliGRAM(s) Oral daily    MEDICATIONS  (PRN):  acetaminophen     Tablet .. 650 milliGRAM(s) Oral every 6 hours PRN Mild Pain (1 - 3), Moderate Pain (4 - 6)  oxyCODONE    IR 7.5 milliGRAM(s) Oral every 4 hours PRN mod-severe pain  oxyCODONE    IR 7.5 milliGRAM(s) Oral once PRN mod-severe pain    T(C): 36.6 (01-07-23 @ 10:23), Max: 37.1 (01-06-23 @ 20:55)  HR: 95 (01-07-23 @ 10:23) (95 - 100)  BP: 145/71 (01-07-23 @ 10:23) (134/75 - 152/80)  RR: 18 (01-07-23 @ 10:23) (18 - 19)  SpO2: 100% (01-07-23 @ 10:23) (100% - 100%)    I&O's Summary    06 Jan 2023 07:01  -  07 Jan 2023 07:00  --------------------------------------------------------  IN: 0 mL / OUT: 400 mL / NET: -400 mL    PHYSICAL EXAM:  GENERAL: NAD, thin, appears older than stated age   CHEST/LUNG: Clear to auscultation bilaterally; No wheeze  HEART: Regular rate and rhythm; No murmurs, rubs, or gallops  ABDOMEN: Soft, Nontender, Nondistended; Bowel sounds present  EXTREMITIES:   warm and well perfused, No clubbing, cyanosis, or edema; R elbow swelling in dependent area, ?IVF infiltation improved   PSYCH: AAOx3  NEUROLOGY: non-focal  L AVF dressing c/d/i    LABS:    01-07    139  |  111<H>  |  71<H>  ----------------------------<  113<H>  4.5   |  12<L>  |  5.19<H>    Ca    7.8<L>      07 Jan 2023 06:54  Phos  7.7     01-07  Mg     1.70     01-07      Consultant(s) Notes Reviewed:    Care Discussed with Consultants/Other Providers:

## 2023-01-07 NOTE — PROGRESS NOTE ADULT - PROBLEM SELECTOR PLAN 1
S/p HD X 3  in ICU in [facility], Cr plateaued at 5.7-6, off HD now; per renal plan for AVF creation on this admission as per d/w OP nephrologist baseline Cr is 3 and nearing ESRD  - avoid nephrotoxic, renally dose meds, trend Cr  - [facility] renal duplex with severe stenosis of the left renal artery; vascular consulted, no plan for intervention for HESHAM  - sevelamer 800 mg TID & bicarb 650 mg TI;  calcitriol 0.25 for secondary hyperparathyroidism per renal  - s/p AVF w/ vascular on 1/5 to the L arm, L arm precautions

## 2023-01-07 NOTE — PROGRESS NOTE ADULT - ASSESSMENT
62M EtoH misuse c/b chronic pancreatitis, HCV s/p treatment (SVR), emphysema, benign esophageal stricture (s/p stent on 4/2022) transferred [facility] for esophageal stent removal due to intractable n/v. Had been at [facility] from 11/25-12/14 for ams 2/2 septic shock due to pseudomonal PNA, lung abscess, Morganella bacteremia and Klebsiella UTI (now s/p 6 wks zosyn ended 1/6) complicated by ATN requiring HD. Now off HD, clinically stable, given progressing to ESRD soon patient is s/p AVF on 1/5.  Now medically ready for CARMEN given weakness/inability walk/deconditioning.

## 2023-01-08 NOTE — PROGRESS NOTE ADULT - ASSESSMENT
62M EtoH misuse c/b chronic pancreatitis, HCV s/p treatment (SVR), emphysema, benign esophageal stricture (s/p stent on 4/2022) transferred [facility] for esophageal stent removal due to intractable n/v. Had been at [facility] from 11/25-12/14 for ams 2/2 septic shock due to pseudomonal PNA, lung abscess, Morganella bacteremia and Klebsiella UTI (now s/p 6 wks zosyn ended 1/6) complicated by ATN requiring HD. Now off HD, clinically stable, given progressing to ESRD soon patient is s/p AVF on 1/5.  Now medically ready for CARMEN given weakness/inability walk/deconditioning.

## 2023-01-08 NOTE — PROGRESS NOTE ADULT - SUBJECTIVE AND OBJECTIVE BOX
Patient is a 62y old  Male who presents with a chief complaint of intractable N/V , esophageal stent removal    SUBJECTIVE / OVERNIGHT EVENTS:    laying in bed, states he was not able to work with PT because they "were dogging me"  also states does not need CARMEN, just thinks his muscles are weak, advised this is point of CARMEN, states they never let him leave, tell him 30 days and then "it is a year"  reports he can go home in a taxi, advised he would have to be able to get in a taxi etc   states just needs oxycodone  Eating cereal/milk/ensure/pudding smashed together, denies lactose intolerance, likes the mix of flavors    MEDICATIONS  (STANDING):  calcitriol   Capsule 0.25 MICROGram(s) Oral daily  citric acid/sodium citrate Solution 15 milliLiter(s) Oral two times a day  epoetin mejia-epbx (RETACRIT) Injectable 23552 Unit(s) SubCutaneous <User Schedule>  folic acid 1 milliGRAM(s) Oral daily  heparin   Injectable 5000 Unit(s) SubCutaneous every 8 hours  iron sucrose IVPB 200 milliGRAM(s) IV Intermittent <User Schedule>  multivitamin 1 Tablet(s) Oral daily  NIFEdipine XL 30 milliGRAM(s) Oral daily  pancrelipase  (CREON  6,000 Lipase Units) 1 Capsule(s) Oral three times a day with meals  pantoprazole    Tablet 40 milliGRAM(s) Oral <User Schedule>  pantoprazole    Tablet 40 milliGRAM(s) Oral before breakfast  sevelamer carbonate 800 milliGRAM(s) Oral three times a day  sodium bicarbonate 650 milliGRAM(s) Oral three times a day  thiamine 100 milliGRAM(s) Oral daily    MEDICATIONS  (PRN):  acetaminophen     Tablet .. 650 milliGRAM(s) Oral every 6 hours PRN Mild Pain (1 - 3), Moderate Pain (4 - 6)  oxyCODONE    IR 7.5 milliGRAM(s) Oral every 4 hours PRN mod-severe pain  oxyCODONE    IR 7.5 milliGRAM(s) Oral once PRN mod-severe pain    T(C): 36.8 (01-08-23 @ 10:16), Max: 37.2 (01-07-23 @ 16:46)  HR: 101 (01-08-23 @ 10:16) (94 - 108)  BP: 138/72 (01-08-23 @ 10:16) (138/72 - 170/76)  RR: 18 (01-08-23 @ 10:16) (18 - 19)  SpO2: 100% (01-08-23 @ 10:16) (100% - 100%)    I&O's Summary    07 Jan 2023 07:01  -  08 Jan 2023 07:00  --------------------------------------------------------  IN: 230 mL / OUT: 0 mL / NET: 230 mL    PHYSICAL EXAM:  GENERAL: NAD, thin, appears older than stated age   CHEST/LUNG: Clear to auscultation bilaterally; No wheeze  HEART: Regular rate and rhythm; No murmurs, rubs, or gallops  ABDOMEN: Soft, Nontender, Nondistended; Bowel sounds present  EXTREMITIES:   warm and well perfused, No clubbing, cyanosis, or edema; R elbow swelling in dependent area, ?IVF infiltration improved   PSYCH: AAOx3  NEUROLOGY: non-focal  L AVF dressing c/d/i    LABS: none    Consultant(s) Notes Reviewed:    Care Discussed with Consultants/Other Providers:   Patient is a 62y old  Male who presents with a chief complaint of intractable N/V , esophageal stent removal    SUBJECTIVE / OVERNIGHT EVENTS:    laying in bed, states he was not able to work with PT because they "were dogging me"  also states does not need CARMEN, just thinks his muscles are weak, advised this is point of CARMEN, states they never let him leave, tell him 30 days and then "it is a year"  reports he can go home in a taxi, advised he would have to be able to get in a taxi etc   states just needs oxycodone  Eating cereal/milk/ensure/pudding smashed together, denies lactose intolerance, likes the mix of flavors    MEDICATIONS  (STANDING):  calcitriol   Capsule 0.25 MICROGram(s) Oral daily  citric acid/sodium citrate Solution 15 milliLiter(s) Oral two times a day  epoetin mejia-epbx (RETACRIT) Injectable 60158 Unit(s) SubCutaneous <User Schedule>  folic acid 1 milliGRAM(s) Oral daily  heparin   Injectable 5000 Unit(s) SubCutaneous every 8 hours  iron sucrose IVPB 200 milliGRAM(s) IV Intermittent <User Schedule>  multivitamin 1 Tablet(s) Oral daily  NIFEdipine XL 30 milliGRAM(s) Oral daily  pancrelipase  (CREON  6,000 Lipase Units) 1 Capsule(s) Oral three times a day with meals  pantoprazole    Tablet 40 milliGRAM(s) Oral <User Schedule>  pantoprazole    Tablet 40 milliGRAM(s) Oral before breakfast  sevelamer carbonate 800 milliGRAM(s) Oral three times a day  sodium bicarbonate 650 milliGRAM(s) Oral three times a day  thiamine 100 milliGRAM(s) Oral daily    MEDICATIONS  (PRN):  acetaminophen     Tablet .. 650 milliGRAM(s) Oral every 6 hours PRN Mild Pain (1 - 3), Moderate Pain (4 - 6)  oxyCODONE    IR 7.5 milliGRAM(s) Oral every 4 hours PRN mod-severe pain  oxyCODONE    IR 7.5 milliGRAM(s) Oral once PRN mod-severe pain    T(C): 36.8 (01-08-23 @ 10:16), Max: 37.2 (01-07-23 @ 16:46)  HR: 101 (01-08-23 @ 10:16) (94 - 108)  BP: 138/72 (01-08-23 @ 10:16) (138/72 - 170/76)  RR: 18 (01-08-23 @ 10:16) (18 - 19)  SpO2: 100% (01-08-23 @ 10:16) (100% - 100%)    I&O's Summary    07 Jan 2023 07:01  -  08 Jan 2023 07:00  --------------------------------------------------------  IN: 230 mL / OUT: 0 mL / NET: 230 mL    PHYSICAL EXAM:  GENERAL: NAD, thin, appears older than stated age   CHEST/LUNG: Clear to auscultation bilaterally; No wheeze  HEART: Regular rate and rhythm; No murmurs, rubs, or gallops  ABDOMEN: Soft, Nontender, Nondistended; Bowel sounds present  EXTREMITIES:   warm and well perfused, No clubbing, cyanosis, or edema; R elbow swelling in dependent area, ?IVF infiltration improved   PSYCH: AAOx3, odd  NEUROLOGY: non-focal  L AVF dressing c/d/i    LABS: none    Consultant(s) Notes Reviewed:    Care Discussed with Consultants/Other Providers:

## 2023-01-08 NOTE — PROGRESS NOTE ADULT - PROBLEM SELECTOR PLAN 5
Chronic pancreatitis; previously on Creon, will resume given likely exocrine insufficiency with malabsorption given BMI of 17.   - c/w Creon, refusing, educated on importance   - c/w oxycodone 7.5 mg q4h prn

## 2023-01-08 NOTE — PROGRESS NOTE ADULT - PROBLEM SELECTOR PLAN 4
Septic shock due to pseudomonal PNA with lung abscess, morganella bacteremia and kelbsiella UTI.  Was on Zosyn since 11/25, [facility] 4 week course of antibiotics versus possibly longer pending imaging at 4 weeks (12/23)  - CXR 12/23 - Stable RUL lesion, with new RLL pneumonia   - seen by ID  - completed 6 wks zosyn 1/6  - per d/w ID no further imaging as 6 wks abx sufficient treatment

## 2023-01-08 NOTE — PROGRESS NOTE ADULT - PROBLEM SELECTOR PLAN 1
S/p HD X 3  in ICU in [facility], Cr plateaued at 5.7-6, off HD now; per renal plan for AVF creation on this admission as per d/w OP nephrologist baseline Cr is 3 and nearing ESRD  - avoid nephrotoxic, renally dose meds, trend Cr  - VA renal duplex with severe stenosis of the left renal artery; vascular consulted, no plan for intervention for HESHAM  - sevelamer 800 mg TID & bicarb 650 mg TID;  calcitriol 0.25 for secondary hyperparathyroidism per renal  - s/p AVF w/ vascular on 1/5 to the L arm, L arm precautions

## 2023-01-08 NOTE — PROGRESS NOTE ADULT - PROBLEM SELECTOR PLAN 2
Intermittent CP; trops elevated on admission 210->199, also elevated at OSH; no DM, LDL 71 9/2022  - EKG with T wave flattening in inferolateral leads, consistent with prior study   - TTE showing new mild global LV dysfunction   - NST positive with inferior wall motion abnormality  - cath on 1/4 nonobstructive CAD, no intervention  - should me on DMT however ace/arb contraindicated in setting of CKD5 not on HD at risk for hyperkalemia also not taking any meds that are prescribed despite encouragement Intermittent CP; trops elevated on admission 210->199, also elevated at OSH; no DM, LDL 71 9/2022  - EKG with T wave flattening in inferolateral leads, consistent with prior study   - TTE showing new mild global LV dysfunction   - NST positive with inferior wall motion abnormality  - cath on 1/4 nonobstructive CAD, no intervention  - should be on DMT however ace/arb contraindicated in setting of CKD5 not on HD at risk for hyperkalemia also not taking any meds that are prescribed despite encouragement

## 2023-01-09 NOTE — CHART NOTE - NSCHARTNOTEFT_GEN_A_CORE
Source: Patient [ x ]  Family [ ]     RN [x ]    Chart [ x ]    RD follow-up for severe malnutrition & requested RDN f/u for excessive ordering of foods/beverages as reported by  department/Nutrition Supervisor.  RD visited with patient and observed an excess of pre-packaged food items and beverages.  Patient was not conversant and RDN was not able to have a meaningful conversation with patient. Explained to patient that he was ordering too many items on his tray and explained extra foods can be provided within reasonable limits if they are being consumed. Spoke to RN for collateral who reported patient is hoarding foods; explained to nurse that it would be best to discourage excessive ordering of beverages and pre-packaged items.    HPI: 62y old  Male who presents with a chief complaint of intractable N/V , esophageal stent removal. PMHx: EtoH misuse c/b chronic pancreatitis, HCV s/p treatment (SVR), emphysema, benign esophageal stricture (s/p stent on 4/2022) transferred [facility] for esophageal stent removal due to intractable n/v. Had been at [facility] from 11/25-12/14 for ams 2/2 septic shock due to pseudomonal PNA, lung abscess, Morganella bacteremia and Klebsiella UTI (now s/p 6 wks zosyn ended 1/6) complicated by ATN requiring HD. Now off HD, clinically stable, given progressing to ESRD soon patient is s/p AVF on 1/5.   Now medically ready for CARMEN given weakness/inability walk/deconditioning.    Patient is receiving Creon.    Diet, Regular:   No Caffeine  Supplement Feeding Modality:  Oral  Ensure Plus High Protein Cans or Servings Per Day:  1       Frequency:  Three Times a day (12-28-22 @ 12:03)    Ensure Plus High Protein Therapeutic Shake 3x daily (1050kcal, 60g protein).     GI: Patient nauseous     PO intake:  < 50% [ ] 50-75% [ ]   % [X]  other :    Enteral /Parenteral Nutrition: n/a    Anthropometrics: Height (cm): 182.9 (01-05), 177.8 (11-25)  Weight (kg): 59.1 (01-05), 42.3 (11-25), no new weights to assess  BMI (kg/m2): 17.7 (01-05), 13.4 (11-25)    Edema: 1+ edema to rt arm and hand  Pressure Injuries: Stage II to Rt buttock    __________________ Pertinent Medications__________________   MEDICATIONS  (STANDING):  calcitriol   Capsule 0.25 MICROGram(s) Oral daily  citric acid/sodium citrate Solution 15 milliLiter(s) Oral two times a day  epoetin mejia-epbx (RETACRIT) Injectable 19728 Unit(s) SubCutaneous <User Schedule>  folic acid 1 milliGRAM(s) Oral daily  heparin   Injectable 5000 Unit(s) SubCutaneous every 8 hours  multivitamin 1 Tablet(s) Oral daily  NIFEdipine XL 30 milliGRAM(s) Oral daily  pancrelipase  (CREON  6,000 Lipase Units) 1 Capsule(s) Oral three times a day with meals  pantoprazole    Tablet 40 milliGRAM(s) Oral two times a day  sevelamer carbonate 800 milliGRAM(s) Oral three times a day  sodium bicarbonate 650 milliGRAM(s) Oral three times a day  thiamine 100 milliGRAM(s) Oral daily    MEDICATIONS  (PRN):  acetaminophen     Tablet .. 650 milliGRAM(s) Oral every 6 hours PRN Mild Pain (1 - 3), Moderate Pain (4 - 6)  ondansetron Injectable 4 milliGRAM(s) IV Push once PRN Nausea and/or Vomiting  oxyCODONE    IR 7.5 milliGRAM(s) Oral every 4 hours PRN mod-severe pain      __________________ Pertinent Labs__________________   01-07 Na139 mmol/L Glu 113 mg/dL<H> K+ 4.5 mmol/L Cr  5.19 mg/dL<H> BUN 71 mg/dL<H> 01-07 Phos 7.7 mg/dL<H>    Estimated Needs:   elizabeth/d  gm pro/d    [ ] no change since previous assessment  [ ] recalculated:     Previous Nutrition Diagnosis: SEVERE MALNUTRITION ongoing  Patient also with Nutrition Dx #2 Altered Nutrition Related Lab Values in setting of advanced CKD as evidenced by Phos 7.7; elevated BUN/Cr.    Monitoring and Evaluation:      [ x] Tolerance to diet prescription [x ] weights [x ] follow up per protocol  [ x ] other: pertinent labs    Recommendations:  1) Suggest Renal diet given plans for HD and elevated serum Phos/renal indices;  2) Suggest change in supplement from Ensure High Protein to Suplena 2x daily (850 kcals, 21.2g protein);  3) Obtain and record current weights;    Nisha Nelson, MS, RDN, CDN  Pager [phone]  Also available on MS Teams Source: Patient [ x ]  Family [ ]     RN [x ]    Chart [ x ]    RD follow-up for severe malnutrition & requested RDN f/u for excessive ordering of foods/beverages as reported by  department/Nutrition Supervisor.  RD visited with patient and observed an excess of pre-packaged food items and beverages.  Patient was not conversant and RDN was not able to have a meaningful conversation with patient. Explained to patient that he was ordering too many items on his tray and explained extra foods can be provided within reasonable limits if they are being consumed. Spoke to RN for collateral who reported patient is hoarding foods; explained to nurse that it would be best to discourage excessive ordering of beverages and pre-packaged items.    HPI: 62y old  Male who presents with a chief complaint of intractable N/V , esophageal stent removal. PMHx: EtoH misuse c/b chronic pancreatitis, HCV s/p treatment (SVR), emphysema, benign esophageal stricture (s/p stent on 4/2022) transferred [facility] for esophageal stent removal due to intractable n/v. Had been at [facility] from 11/25-12/14 for ams 2/2 septic shock due to pseudomonal PNA, lung abscess, Morganella bacteremia and Klebsiella UTI (now s/p 6 wks zosyn ended 1/6) complicated by ATN requiring HD. Currently off HD but noted patient is likely requiring eventual HD and is s/p AVF on 1/5.     Patient is receiving Creon with noted refusals at times despite encouragement to take medications as prescribed.    Spoke to PA and communicated recommendations.    Diet, Regular:   No Caffeine  Supplement Feeding Modality:  Oral  Ensure Plus High Protein Cans or Servings Per Day:  1       Frequency:  Three Times a day (12-28-22 @ 12:03)    Ensure Plus High Protein Therapeutic Shake 3x daily (1050kcal, 60g protein).     GI: Patient nauseous     PO intake:  < 50% [ ] 50-75% [ ]   % [X]  other :    Enteral /Parenteral Nutrition: n/a    Anthropometrics: Height (cm): 182.9 (01-05), 177.8 (11-25)  Weight (kg): 59.1 (01-05), 42.3 (11-25), no new weights to assess  BMI (kg/m2): 17.7 (01-05), 13.4 (11-25)    Edema: 1+ edema to rt arm and hand  Pressure Injuries: Stage II to Rt buttock    __________________ Pertinent Medications__________________   MEDICATIONS  (STANDING):  calcitriol   Capsule 0.25 MICROGram(s) Oral daily  citric acid/sodium citrate Solution 15 milliLiter(s) Oral two times a day  epoetin mejia-epbx (RETACRIT) Injectable 82579 Unit(s) SubCutaneous <User Schedule>  folic acid 1 milliGRAM(s) Oral daily  heparin   Injectable 5000 Unit(s) SubCutaneous every 8 hours  multivitamin 1 Tablet(s) Oral daily  NIFEdipine XL 30 milliGRAM(s) Oral daily  pancrelipase  (CREON  6,000 Lipase Units) 1 Capsule(s) Oral three times a day with meals  pantoprazole    Tablet 40 milliGRAM(s) Oral two times a day  sevelamer carbonate 800 milliGRAM(s) Oral three times a day  sodium bicarbonate 650 milliGRAM(s) Oral three times a day  thiamine 100 milliGRAM(s) Oral daily    MEDICATIONS  (PRN):  acetaminophen     Tablet .. 650 milliGRAM(s) Oral every 6 hours PRN Mild Pain (1 - 3), Moderate Pain (4 - 6)  ondansetron Injectable 4 milliGRAM(s) IV Push once PRN Nausea and/or Vomiting  oxyCODONE    IR 7.5 milliGRAM(s) Oral every 4 hours PRN mod-severe pain      __________________ Pertinent Labs__________________   01-07 Na139 mmol/L Glu 113 mg/dL<H> K+ 4.5 mmol/L Cr  5.19 mg/dL<H> BUN 71 mg/dL<H> 01-07 Phos 7.7 mg/dL<H>    Estimated Needs: No change from previous assessment    Previous Nutrition Diagnosis: SEVERE MALNUTRITION ongoing  Patient also with Nutrition Dx #2 Altered Nutrition Related Lab Values in setting of advanced CKD as evidenced by Phos 7.7; elevated BUN/Cr.    Monitoring and Evaluation:      [ x] Tolerance to diet prescription [x ] weights [x ] follow up per protocol  [ x ] other: pertinent labs    Recommendations:  1) Suggest Renal diet elevated serum Phos/renal indices;  2) Suggest change in supplement from Ensure High Protein to Suplena 2x daily (850 kcals, 21.2g protein);  3) Obtain and record current weights;    Nisha Nelson MS, RDN, CDN  Pager [phone]  Also available on MS Teams

## 2023-01-09 NOTE — CHART NOTE - NSCHARTNOTEFT_GEN_A_CORE
Notified by RN that patient with an episode of vomiting. Patient evaluated at bedside, with complaint of nausea and abdominal pain. +Flatus, last BM today in the AM. Noted to have dark yellow/ light brown emesis. Patient with pmhx chronic pancreatitis, esophageal stricture s/p stent removal and recent EGD 12/16/22 showing esophagitis. Duodenal biopsy showed chronic peptic duodenitis w/ surface gastric metaplasia. Gastric biopsy with reactive gastropathy, neg for H pylori/ metaplasia. Patient noted to have refused pantoprazole PO today and yesterday. Per RN patient also refusing other oral medications.     General: A&Ox3 in discomfort 2/2 pain    Cardiac: +S1, S2 RRR.   Pulm: CTA B/L   Abdomen: soft, nondistended. Tender x4 quadrants. +BS x4 quadrants. No rebound or guarding.   Extremities: warm, no edema.     Advised RN to administer oxy prn and send AM labs/ (reordered as STAT labs at 15:35) as directed. Ordered Zofran PRN, PPI 40mg IV (as patient refusing oral) and carafate suspension. Encouraged patient to accept PO meds. Will endorse to night team. Notified by RN that patient with an episode of vomiting. Patient evaluated at bedside, with complaint of nausea and abdominal pain. +Flatus, last BM today in the AM. Noted to have dark yellow/ light brown emesis. Patient with pmhx chronic pancreatitis, esophageal stricture s/p stent removal and recent EGD 12/16/22 showing esophagitis. Duodenal biopsy showed chronic peptic duodenitis w/ surface gastric metaplasia. Gastric biopsy with reactive gastropathy, neg for H pylori/ metaplasia. Patient noted to have refused pantoprazole PO today and yesterday. Per RN patient also refusing other oral medications.     General: A&Ox3 in discomfort 2/2 pain    Cardiac: +S1, S2 RRR.   Pulm: CTA B/L   Abdomen: soft, nondistended. Tender x4 quadrants. +BS x4 quadrants. No rebound or guarding.   Extremities: warm, no edema.     Advised RN to administer oxy prn and send AM labs/ (reordered as STAT labs at 15:35) as directed. Ordered Zofran PRN, PPI 40mg IV (as patient refusing oral) and carafate suspension. Encouraged patient to accept PO meds. Will endorse to night team.    ------------    Addendum: notified by RN that patient refusing lab draws. Encouraged patient, however refusing.

## 2023-01-09 NOTE — PROGRESS NOTE ADULT - SUBJECTIVE AND OBJECTIVE BOX
[facility] DIVISION OF KIDNEY DISEASES AND HYPERTENSION -- FOLLOW UP NOTE  --------------------------------------------------------------------------------  HPI: Pt is a 62-year-old Male with Hx of esophageal stricture (S/p stenting in 5/22), Emphysema, HCV, chronic pancreatitis who initially presented to [facility] on 11/25/22 for AMS. Pt was admitted to [facility] on 11/25 for AMS 2/2 Septic shock. Pt was initially intubated for acute respiratory failure and required pressors for hypotension. Pt's hospital course was complicated by YUNIEL/ATN requiring HD. Pt was extubated and was transferred to [facility] for esophageal stent removal by Dr. Erickson. Initial labs on this admission concerning for elevated Scr. Nephrology team following for YUNIEL on CKD.     On review of [facility], it was noted that Scr was elevated at 2.20 in September 2022. Scr on admission was significantly elevated at 8.94 on 11/25/22. Pt received 1st HD session on 11/26/22. Last HD session was done on 12/1/22. HD catheter was removed as kidney function was recovering. Scr was elevated at 5.28 on transfer from [facility] on 12/15/22. Scr remains elevated/stable at 5-6 range. Scr was elevated/stable at 5.19 on 1/7/23.     Pt. seen and examined this morning. Pt awake reports pain at AVF site, wishes to get oxycodone. Denies any chest pain, shortness of breath, or dizziness. Reports that diarrhea has resolved. Pt underwent cardiac cath on 1/4/23 and LUE AVF creation on 1/5/23.      PAST HISTORY  --------------------------------------------------------------------------------  No significant changes to PMH, PSH, FHx, SHx, unless otherwise noted    ALLERGIES & MEDICATIONS  --------------------------------------------------------------------------------  Allergies    Tylenol (Other)    Intolerances    Standing Inpatient Medications  calcitriol   Capsule 0.25 MICROGram(s) Oral daily  citric acid/sodium citrate Solution 15 milliLiter(s) Oral two times a day  epoetin mejia-epbx (RETACRIT) Injectable 77051 Unit(s) SubCutaneous <User Schedule>  folic acid 1 milliGRAM(s) Oral daily  heparin   Injectable 5000 Unit(s) SubCutaneous every 8 hours  multivitamin 1 Tablet(s) Oral daily  NIFEdipine XL 30 milliGRAM(s) Oral daily  pancrelipase  (CREON  6,000 Lipase Units) 1 Capsule(s) Oral three times a day with meals  pantoprazole    Tablet 40 milliGRAM(s) Oral two times a day  sevelamer carbonate 800 milliGRAM(s) Oral three times a day  sodium bicarbonate 650 milliGRAM(s) Oral three times a day  thiamine 100 milliGRAM(s) Oral daily    PRN Inpatient Medications  acetaminophen     Tablet .. 650 milliGRAM(s) Oral every 6 hours PRN  ondansetron Injectable 4 milliGRAM(s) IV Push once PRN  oxyCODONE    IR 7.5 milliGRAM(s) Oral every 4 hours PRN    REVIEW OF SYSTEMS  --------------------------------------------------------------------------------  Gen: No fevers   Respiratory: No dyspnea  CV: No chest pain  GI: No abdominal pain  MSK: No  edema  Neuro: no dizziness   All other systems were reviewed and are negative, except as noted.    VITALS/PHYSICAL EXAM  --------------------------------------------------------------------------------  T(C): 36.7 (01-09-23 @ 10:40), Max: 36.9 (01-08-23 @ 18:11)  HR: 99 (01-09-23 @ 10:40) (98 - 100)  BP: 140/75 (01-09-23 @ 10:40) (139/77 - 143/83)  RR: 18 (01-09-23 @ 10:40) (16 - 18)  SpO2: 100% (01-09-23 @ 10:40) (100% - 100%)  Wt(kg): --    01-08-23 @ 07:01  -  01-09-23 @ 07:00  --------------------------------------------------------  IN: 1570 mL / OUT: 1150 mL / NET: 420 mL    Physical Exam:  	Gen NAD  	HEENT: no JVD  	Pulm: CTABL  	CV: S1S2,  	Abd: Soft,   	Ext:   - edema B/L LE   	Neuro: Awake and alert  	Skin: Warm and dry              Vascular: , AVF + bruit    LABS/STUDIES  --------------------------------------------------------------------------------      Creatinine Trend:  SCr 5.19 [01-07 @ 06:54]  SCr 5.40 [01-05 @ 02:43]  SCr 5.75 [01-04 @ 06:45]  SCr 5.84 [01-03 @ 06:12]  SCr 5.81 [12-30 @ 05:15]    Urinalysis - [12-18-22 @ 00:40]      Color Light Yellow / Appearance Clear / SG 1.013 / pH 6.0      Gluc Negative / Ketone Negative  / Bili Negative / Urobili <2 mg/dL       Blood Trace / Protein 30 mg/dL / Leuk Est Negative / Nitrite Negative      RBC 2 / WBC 1 / Hyaline  / Gran  / Sq Epi  / Non Sq Epi 1 / Bacteria Negative      Iron 104, TIBC TNP, %sat --      [01-03-23 @ 06:12]  Ferritin 311      [01-03-23 @ 06:12]  PTH -- (Ca --)      [12-22-22 @ 06:40]   290  Vitamin D (25OH) 10.4      [01-04-23 @ 06:45]  HbA1c 5.0      [03-22-19 @ 09:03]  TSH 1.380      [09-01-22 @ 08:58]  Lipid: chol 147, TG 88, HDL 59, LDL --      [09-01-22 @ 08:58]

## 2023-01-09 NOTE — PROGRESS NOTE ADULT - ATTENDING COMMENTS
advanced CKD     acidosis refusing  Bicarbonate   anemia Low iron indices s/p IV Venofer and EMMANUEL dose last week. will re-dose once labs are available    s/p cardiac cath .and fistula last week   needs labs . not volume overloaded   Assessment and plan as outlined above. Monitor labs and urine output. Avoid any potential nephrotoxins. Dose medications as per eGFR.  Further detailed plan of management and recommendation as outlined above by the renal fellow.

## 2023-01-09 NOTE — PROGRESS NOTE ADULT - ASSESSMENT
62M EtoH misuse c/b chronic pancreatitis, HCV s/p treatment (SVR), emphysema, benign esophageal stricture (s/p stent on 4/2022) transferred [facility] for esophageal stent removal due to intractable n/v. Had been at [facility] from 11/25-12/14 for ams 2/2 septic shock due to pseudomonal PNA, lung abscess, Morganella bacteremia and Klebsiella UTI (now s/p 6 wks zosyn ended 1/6) complicated by ATN requiring HD. Now off HD, clinically stable, given progressing to ESRD soon patient is s/p AVF on 1/5.  Now medically ready for CARMEN given weakness/inability walk/deconditioning.

## 2023-01-09 NOTE — PROGRESS NOTE ADULT - PROBLEM SELECTOR PLAN 5
Chronic pancreatitis; previously on Creon, will resume given likely exocrine insufficiency with malabsorption given BMI of 17.   - c/w Creon, refusing, educated on importance   - c/w oxycodone 7.5 mg q4h prn Home Statement Selected

## 2023-01-09 NOTE — PROGRESS NOTE ADULT - PROBLEM SELECTOR PLAN 1
Pt with YUNIEL on CKD likely multifactorial in the setting of recent COVID infection, poor oral intake. Scr was elevated at 2.20 in September 2022. Pt was recently admitted at [facility]. Scr on admission was significantly elevated at 8.94 on 11/25/22. Pt received 1st HD session on 11/26/22. Last HD session was done on 12/1/22. HD catheter was removed as kidney function was recovering. Scr was elevated at 5.28 on transfer from [facility] on 12/15/22. Scr remains elevated/stable at 5.40 today (1/5/23). No urgent indication for HD today. Of note kidney sonogram had discrepant size renal artery duplex showing HESHAM but BP well controlled. Discussed with Dr. Dukes (12/21) who knows the patient very well and has seen him inpatient and outpatient over the last several years. As per Dr. Hanks the patient's baseline creatinine is 3 and has advanced CKD and is nearing ESKD. Pt underwent cardiac cath on 1/4/23 , tolerated procedure well. Underwent LUE AVF creation  on 1/5/23. No labs in last 2 days. Awaiting labs from today. Monitor labs and urine output. Avoid any potential nephrotoxins. Dose medications as per eGFR.

## 2023-01-09 NOTE — PROGRESS NOTE ADULT - SUBJECTIVE AND OBJECTIVE BOX
Priya Arce     62y old  Male who presents with a chief complaint of intractable N/V , esophageal stent removal (09 Jan 2023 13:58)      SUBJECTIVE / OVERNIGHT EVENTS: No acute overnight events. This morning pt doing well. Denies fevers, chills, nausea, vomiting, chest pain, SOB.    MEDICATIONS  (STANDING):  calcitriol   Capsule 0.25 MICROGram(s) Oral daily  citric acid/sodium citrate Solution 15 milliLiter(s) Oral two times a day  epoetin mejia-epbx (RETACRIT) Injectable 80422 Unit(s) SubCutaneous <User Schedule>  folic acid 1 milliGRAM(s) Oral daily  heparin   Injectable 5000 Unit(s) SubCutaneous every 8 hours  multivitamin 1 Tablet(s) Oral daily  NIFEdipine XL 30 milliGRAM(s) Oral daily  pancrelipase  (CREON  6,000 Lipase Units) 1 Capsule(s) Oral three times a day with meals  pantoprazole    Tablet 40 milliGRAM(s) Oral two times a day  sevelamer carbonate 800 milliGRAM(s) Oral three times a day  sodium bicarbonate 650 milliGRAM(s) Oral three times a day  thiamine 100 milliGRAM(s) Oral daily    MEDICATIONS  (PRN):  acetaminophen     Tablet .. 650 milliGRAM(s) Oral every 6 hours PRN Mild Pain (1 - 3), Moderate Pain (4 - 6)  ondansetron Injectable 4 milliGRAM(s) IV Push once PRN Nausea and/or Vomiting  oxyCODONE    IR 7.5 milliGRAM(s) Oral every 4 hours PRN mod-severe pain    Allergies    Tylenol (Other)    Intolerances        Vital Signs Last 24 Hrs  T(C): 36.7 (09 Jan 2023 10:40), Max: 36.9 (08 Jan 2023 18:11)  T(F): 98 (09 Jan 2023 10:40), Max: 98.5 (08 Jan 2023 18:11)  HR: 99 (09 Jan 2023 10:40) (98 - 100)  BP: 140/75 (09 Jan 2023 10:40) (139/77 - 143/83)  BP(mean): --  RR: 18 (09 Jan 2023 10:40) (16 - 18)  SpO2: 100% (09 Jan 2023 10:40) (100% - 100%)    Parameters below as of 09 Jan 2023 10:40  Patient On (Oxygen Delivery Method): room air      Daily     Daily   CAPILLARY BLOOD GLUCOSE        I&O's Summary    08 Jan 2023 07:01  -  09 Jan 2023 07:00  --------------------------------------------------------  IN: 1570 mL / OUT: 1150 mL / NET: 420 mL        PHYSICAL EXAM:  GENERAL: NAD, thin  CHEST/LUNG: Clear to auscultation bilaterally; No wheeze  HEART: Regular rate and rhythm; No murmurs, rubs, or gallops  ABDOMEN: Soft, Nontender, Nondistended; Bowel sounds present  EXTREMITIES:   warm and well perfused, No clubbing, cyanosis, or edema; R elbow swelling in dependent area   PSYCH: AAOx3  NEUROLOGY: non-focal  L AVF dressing c/d/i    LABS:    Hgb Trend: 7.2<--, 7.3<--, 7.3<--        Creatinine Trend: 5.19<--, 5.40<--, 5.75<--, 5.84<--, 5.81<--, 5.94<--              RADIOLOGY & ADDITIONAL TESTS:    Imaging Personally Reviewed.    Consultant(s) Notes Reviewed.    Care Discussed with Consultants/Other Providers.

## 2023-01-09 NOTE — PROGRESS NOTE ADULT - PROBLEM SELECTOR PLAN 4
Septic shock due to pseudomonal PNA with lung abscess, morganella bacteremia and kelbsiella UTI.  Was [facility] since 11[facility]25, [facility] 4 week course of antibiotics versus possibly longer pending imaging at 4 weeks (12/23)  - CXR 12/23 - Stable RUL lesion, with new RLL pneumonia   - seen by ID  - completed 6 wks zosyn 1/6  - per d/w ID no further imaging as 6 wks abx sufficient treatment

## 2023-01-09 NOTE — PROGRESS NOTE ADULT - PROBLEM SELECTOR PLAN 2
Secondary hyperparathyroidism. Ionized calcium was low and was started on calcitriol 0.25mcg daily. Check serum phos level. Continue with  Sevelamer 800mg TID with meals. Monitor phos levels.

## 2023-01-09 NOTE — PROGRESS NOTE ADULT - PROBLEM SELECTOR PLAN 3
Pt. with anemia suspected in setting of renal failure. Please check iron studies and ferritin levels. Monitor Hb.

## 2023-01-09 NOTE — PROGRESS NOTE ADULT - PROBLEM SELECTOR PLAN 4
Pt. with acidosis in setting of renal failure. Serum CO2 low at 12 on 1/7/23. Continue with Bicitra 15ml PO BID and bicarb tabs 650 gm OP TID.  Monitor SCO2.     If you have any questions, please feel free to contact me  Jeff Pete  Nephrology Fellow  [phone]/ [employer] Teams(Preferred)  (After 5pm or on weekends please page the on-call fellow).

## 2023-01-09 NOTE — PROGRESS NOTE ADULT - PROBLEM SELECTOR PLAN 2
Intermittent CP; trops elevated on admission 210->199, also elevated at OSH; no DM, LDL 71 9/2022  - EKG with T wave flattening in inferolateral leads, consistent with prior study   - TTE showing new mild global LV dysfunction   - NST positive with inferior wall motion abnormality  - cath on 1/4 nonobstructive CAD, no intervention  - should be on DMT however ace/arb contraindicated in setting of CKD5 not on HD at risk for hyperkalemia also not taking any meds that are prescribed despite encouragement

## 2023-01-09 NOTE — PROGRESS NOTE ADULT - PROBLEM SELECTOR PLAN 1
S/p HD X 3  in ICU in [facility], Cr plateaued at 5.7-6, off HD now; per renal plan for AVF creation on this admission as per d/w OP nephrologist baseline Cr is 3 and nearing ESRD  - avoid nephrotoxic, renally dose meds, trend Cr  - VA renal duplex with severe stenosis of the left renal artery; vascular consulted, no plan for intervention for HESHAM  - sevelamer 800 mg TID & bicarb 650 mg TID;  calcitriol 0.25 for secondary hyperparathyroidism per renal  - s/p AVF w/ vascular on 1/5 to the L arm, L arm precautions

## 2023-01-10 NOTE — BH CONSULTATION LIAISON ASSESSMENT NOTE - CURRENT MEDICATION
MEDICATIONS  (STANDING):  calcitriol   Capsule 0.25 MICROGram(s) Oral daily  citric acid/sodium citrate Solution 15 milliLiter(s) Oral two times a day  epoetin mejia-epbx (RETACRIT) Injectable 86324 Unit(s) SubCutaneous <User Schedule>  folic acid 1 milliGRAM(s) Oral daily  heparin   Injectable 5000 Unit(s) SubCutaneous every 8 hours  multivitamin 1 Tablet(s) Oral daily  NIFEdipine XL 30 milliGRAM(s) Oral daily  pancrelipase  (CREON  6,000 Lipase Units) 1 Capsule(s) Oral three times a day with meals  pantoprazole    Tablet 40 milliGRAM(s) Oral two times a day  sevelamer carbonate 800 milliGRAM(s) Oral three times a day  sodium bicarbonate 650 milliGRAM(s) Oral three times a day  sucralfate 1 Gram(s) Oral once  thiamine 100 milliGRAM(s) Oral daily    MEDICATIONS  (PRN):  ondansetron Injectable 4 milliGRAM(s) IV Push every 6 hours PRN Nausea and/or Vomiting  oxyCODONE    IR 7.5 milliGRAM(s) Oral every 4 hours PRN mod-severe pain

## 2023-01-10 NOTE — BH CONSULTATION LIAISON ASSESSMENT NOTE - NSBHATTESTCOMMENTATTENDFT_PSY_A_CORE
Chart reviewed. Pt seen with fellow. Presents as odd, disheveled, slightly guarded. Says psych was consulted because he was talking about "psycho-kinesis" and the medical team did not know what that was. We explained we were called for refusal of blood draws, but he provided above explanation. Agreeable to care. Has chronic overvalued ideas/benign delusions. No safety concerns or serious psychopathology to target. Agree with above assessment/recs. Advise ongoing medical care and to call us back if any questions arise. Will sign off for now

## 2023-01-10 NOTE — BH CONSULTATION LIAISON ASSESSMENT NOTE - MSE UNSTRUCTURED FT
Appearance: appears as stated age, in hospital attire, cachectic  Attitude: cooperative  Psychomotor Activity: normal  Speech: Normal volume, rate and rhythm  Mood: normal  Affect: normal  Thought Process: Intact  Thought content: Benign delusions (appear chronic), no suicidal or homicidal ideations, no obsessions or  compulsions  Perceptual disturbances: none  Cognitive Exam: Alert  Attention: intact  Concentration Intact  Orientation: oriented to time, place, and person  Memory: Intact -immediate, recent and remote  Insight: Fair  Judgment: Fair  Impulse control: Intact

## 2023-01-10 NOTE — PROGRESS NOTE ADULT - PROBLEM SELECTOR PLAN 4
Pt. with acidosis in setting of renal failure. Serum CO2 low at 12 on 1/7/23. Continue with Bicitra 15ml PO BID and bicarb tabs 650 gm OP TID. Check BMP today. Monitor SCO2    If you have any questions, please feel free to contact me  Jeff Pete  Nephrology Fellow  [phone]/ [employer] Teams(Preferred)  (After 5pm or on weekends please page the on-call fellow).

## 2023-01-10 NOTE — BH CONSULTATION LIAISON ASSESSMENT NOTE - NSBHCHARTREVIEWVS_PSY_A_CORE FT
Vital Signs Last 24 Hrs  T(C): 36.7 (10 Josiah 2023 10:00), Max: 37.1 (09 Jan 2023 22:27)  T(F): 98.1 (10 Josiah 2023 10:00), Max: 98.8 (09 Jan 2023 22:27)  HR: 89 (10 Josiah 2023 10:00) (89 - 100)  BP: 131/65 (10 Josiah 2023 10:00) (130/62 - 149/75)  BP(mean): --  RR: 18 (10 Josiah 2023 10:00) (18 - 18)  SpO2: 100% (10 Josiah 2023 10:00) (100% - 100%)    Parameters below as of 10 Josiah 2023 05:16  Patient On (Oxygen Delivery Method): room air

## 2023-01-10 NOTE — PROGRESS NOTE ADULT - PROBLEM SELECTOR PLAN 6
Septic shock due to pseudomonal PNA with lung abscess, morganella bacteremia and kelbsiella UTI.  [facility] versus possibly longer pending imaging at 4 weeks (12/23)  - CXR 12/23 - Stable RUL lesion, with new RLL pneumonia   - seen by ID  - completed 6 wks zosyn 1/6  - per d/w ID no further imaging as 6 wks abx sufficient treatment

## 2023-01-10 NOTE — BH CONSULTATION LIAISON ASSESSMENT NOTE - RISK ASSESSMENT
chronic risk factors: hx of substance use; psychosocial stressors; chronic untreatable illness; single. Protective factors: no history of hospitalizations, no formal diagnosis; no suicide attempts; no self-injurious behavior; no hx of aggression/violence; motivated for help; articulate; strong family support; access to health services. Denies access to firearms. No acute risk factors identified.

## 2023-01-10 NOTE — PROGRESS NOTE ADULT - SUBJECTIVE AND OBJECTIVE BOX
Priya Arce    62y old  Male who presents with a chief complaint of intractable N/V , esophageal stent removal (10 Josiah 2023 11:31)      SUBJECTIVE / OVERNIGHT EVENTS: No acute overnight events. This morning pt appears frustrated/agitated. States people are causing his pain through "psychokinetics". When asked to elaborate further, pt states "you're a doctor you figure it out." Refusing medications as pt believes medications induce his pain instead of solve it. When blood draws discussed w/ patient, states he is open to blood draws but would like team to be gentle. Complains of pain "all over" but particularly abdominal pain. Otherwise without new complaints        MEDICATIONS  (STANDING):  calcitriol   Capsule 0.25 MICROGram(s) Oral daily  citric acid/sodium citrate Solution 15 milliLiter(s) Oral two times a day  epoetin mejia-epbx (RETACRIT) Injectable 27127 Unit(s) SubCutaneous <User Schedule>  folic acid 1 milliGRAM(s) Oral daily  heparin   Injectable 5000 Unit(s) SubCutaneous every 8 hours  multivitamin 1 Tablet(s) Oral daily  NIFEdipine XL 30 milliGRAM(s) Oral daily  pancrelipase  (CREON  6,000 Lipase Units) 1 Capsule(s) Oral three times a day with meals  pantoprazole    Tablet 40 milliGRAM(s) Oral two times a day  sevelamer carbonate 800 milliGRAM(s) Oral three times a day  sodium bicarbonate 650 milliGRAM(s) Oral three times a day  sucralfate 1 Gram(s) Oral once  thiamine 100 milliGRAM(s) Oral daily    MEDICATIONS  (PRN):  ondansetron Injectable 4 milliGRAM(s) IV Push every 6 hours PRN Nausea and/or Vomiting  oxyCODONE    IR 7.5 milliGRAM(s) Oral every 4 hours PRN mod-severe pain    Allergies    Tylenol (Other)    Intolerances        Vital Signs Last 24 Hrs  T(C): 36.9 (10 Josiah 2023 17:46), Max: 37.1 (09 Jan 2023 22:27)  T(F): 98.5 (10 Josiah 2023 17:46), Max: 98.8 (09 Jan 2023 22:27)  HR: 86 (10 Josiah 2023 17:46) (86 - 100)  BP: 135/64 (10 Josiah 2023 17:46) (130/62 - 149/75)  BP(mean): --  RR: 19 (10 Josiah 2023 17:46) (18 - 19)  SpO2: 100% (10 Josiah 2023 17:46) (100% - 100%)    Parameters below as of 10 Josiah 2023 17:46  Patient On (Oxygen Delivery Method): room air      Daily     Daily   CAPILLARY BLOOD GLUCOSE        I&O's Summary      PHYSICAL EXAM:  GENERAL: NAD, thin  CHEST/LUNG: Clear to auscultation bilaterally; No wheeze  HEART: Regular rate and rhythm; No murmurs, rubs, or gallops  ABDOMEN: Soft, Nontender, Nondistended; Bowel sounds present  EXTREMITIES:   warm and well perfused, No clubbing, cyanosis, or edema; R elbow swelling in dependent area   PSYCH: AAOx3  NEUROLOGY: non-focal  L AVF dressing c/d/i    LABS:                        6.7    9.21  )-----------( 235      ( 10 Josiah 2023 14:34 )             20.9     Hgb Trend: 6.7<--, 7.2<--, 7.3<--  01-10    138  |  107  |  73<H>  ----------------------------<  87  3.8   |  17<L>  |  4.68<H>    Ca    7.4<L>      10 Josiah 2023 14:34  Phos  7.2     01-10  Mg     2.00     01-10      Creatinine Trend: 4.68<--, 5.19<--, 5.40<--, 5.75<--, 5.84<--, 5.81<--              RADIOLOGY & ADDITIONAL TESTS:    Imaging Personally Reviewed.    Consultant(s) Notes Reviewed.    Care Discussed with Consultants/Other Providers.

## 2023-01-10 NOTE — PROGRESS NOTE ADULT - SUBJECTIVE AND OBJECTIVE BOX
[facility] DIVISION OF KIDNEY DISEASES AND HYPERTENSION -- FOLLOW UP NOTE  --------------------------------------------------------------------------------  HPI: Pt is a 62-year-old Male with Hx of esophageal stricture (S/p stenting in 5/22), Emphysema, HCV, chronic pancreatitis who initially presented to [facility] on 11/25/22 for AMS. Pt was admitted to [facility] on 11/25 for AMS 2/2 Septic shock. Pt was initially intubated for acute respiratory failure and required pressors for hypotension. Pt's hospital course was complicated by YUNIEL/ATN requiring HD. Pt was extubated and was transferred to [facility] for esophageal stent removal by Dr. Erickson. Initial labs on this admission concerning for elevated Scr. Nephrology team following for YUNIEL on CKD.     On review of [facility], it was noted that Scr was elevated at 2.20 in September 2022. Scr on admission was significantly elevated at 8.94 on 11/25/22. Pt received 1st HD session on 11/26/22. Last HD session was done on 12/1/22. HD catheter was removed as kidney function was recovering. Scr was elevated at 5.28 on transfer from [facility] on 12/15/22. Scr remains elevated/stable at 5-6 range. Scr was elevated/stable at 5.19 on 1/7/23.     Pt. seen and examined this morning. Pt awake reports pain at AVF site, wishes to get oxycodone. Denies any chest pain, shortness of breath, or dizziness. Reports that diarrhea has resolved. Pt underwent cardiac cath on 1/4/23 and LUE AVF creation on 1/5/23. Refusing labs today.      PAST HISTORY  --------------------------------------------------------------------------------  No significant changes to PMH, PSH, FHx, SHx, unless otherwise noted    ALLERGIES & MEDICATIONS  --------------------------------------------------------------------------------  Allergies    Tylenol (Other)    Intolerances    Standing Inpatient Medications  calcitriol   Capsule 0.25 MICROGram(s) Oral daily  citric acid/sodium citrate Solution 15 milliLiter(s) Oral two times a day  epoetin mejia-epbx (RETACRIT) Injectable 59952 Unit(s) SubCutaneous <User Schedule>  folic acid 1 milliGRAM(s) Oral daily  heparin   Injectable 5000 Unit(s) SubCutaneous every 8 hours  multivitamin 1 Tablet(s) Oral daily  NIFEdipine XL 30 milliGRAM(s) Oral daily  pancrelipase  (CREON  6,000 Lipase Units) 1 Capsule(s) Oral three times a day with meals  pantoprazole    Tablet 40 milliGRAM(s) Oral two times a day  sevelamer carbonate 800 milliGRAM(s) Oral three times a day  sodium bicarbonate 650 milliGRAM(s) Oral three times a day  sucralfate 1 Gram(s) Oral once  thiamine 100 milliGRAM(s) Oral daily    PRN Inpatient Medications  ondansetron Injectable 4 milliGRAM(s) IV Push every 6 hours PRN  oxyCODONE    IR 7.5 milliGRAM(s) Oral every 4 hours PRN    REVIEW OF SYSTEMS  --------------------------------------------------------------------------------  Gen: No fevers , see HPI  Respiratory: No dyspnea  CV: No chest pain  GI: No abdominal pain  MSK: No  edema  Neuro: no dizziness   All other systems were reviewed and are negative, except as noted.    VITALS/PHYSICAL EXAM  --------------------------------------------------------------------------------  T(C): 36.7 (01-10-23 @ 10:00), Max: 37.1 (01-09-23 @ 22:27)  HR: 89 (01-10-23 @ 10:00) (89 - 104)  BP: 131/65 (01-10-23 @ 10:00) (130/62 - 160/80)  RR: 18 (01-10-23 @ 10:00) (18 - 18)  SpO2: 100% (01-10-23 @ 10:00) (100% - 100%)  Wt(kg): --    Physical Exam:  	Gen NAD  	HEENT: no JVD  	Pulm: CTABL  	CV: S1S2,  	Abd: Soft,   	Ext:   - edema B/L LE   	Neuro: Awake and alert  	Skin: Warm and dry              Vascular: , AVF + bruit    LABS/STUDIES  --------------------------------------------------------------------------------      Creatinine Trend:  SCr 5.19 [01-07 @ 06:54]  SCr 5.40 [01-05 @ 02:43]  SCr 5.75 [01-04 @ 06:45]  SCr 5.84 [01-03 @ 06:12]  SCr 5.81 [12-30 @ 05:15]    Urinalysis - [12-18-22 @ 00:40]      Color Light Yellow / Appearance Clear / SG 1.013 / pH 6.0      Gluc Negative / Ketone Negative  / Bili Negative / Urobili <2 mg/dL       Blood Trace / Protein 30 mg/dL / Leuk Est Negative / Nitrite Negative      RBC 2 / WBC 1 / Hyaline  / Gran  / Sq Epi  / Non Sq Epi 1 / Bacteria Negative    Iron 104, TIBC TNP, %sat --      [01-03-23 @ 06:12]  Ferritin 311      [01-03-23 @ 06:12]  PTH -- (Ca --)      [12-22-22 @ 06:40]   290  Vitamin D (25OH) 10.4      [01-04-23 @ 06:45]  HbA1c 5.0      [03-22-19 @ 09:03]  TSH 1.380      [09-01-22 @ 08:58]  Lipid: chol 147, TG 88, HDL 59, LDL --      [09-01-22 @ 08:58]

## 2023-01-10 NOTE — PROGRESS NOTE ADULT - PROBLEM SELECTOR PLAN 1
Pt with YUNIEL on CKD likely multifactorial in the setting of recent COVID infection, poor oral intake. Scr was elevated at 2.20 in September 2022. Pt was recently admitted at [facility]. Scr on admission was significantly elevated at 8.94 on 11/25/22. Pt received 1st HD session on 11/26/22. Last HD session was done on 12/1/22. HD catheter was removed as kidney function was recovering. Scr was elevated at 5.28 on transfer from [facility] on 12/15/22. Scr remains elevated/stable at 5.40 today (1/5/23). No urgent indication for HD today. Of note kidney sonogram had discrepant size renal artery duplex showing HESHAM but BP well controlled. Discussed with Dr. Dukes (12/21) who knows the patient very well and has seen him inpatient and outpatient over the last several years. As per Dr. Hanks the patient's baseline creatinine is 3 and has advanced CKD and is nearing ESKD. Pt underwent cardiac cath on 1/4/23 , tolerated procedure well. Underwent LUE AVF creation  on 1/5/23. No labs since 1/7. Awaiting labs from today. Monitor labs and urine output. Avoid any potential nephrotoxins. Dose medications as per eGFR.

## 2023-01-10 NOTE — PROGRESS NOTE ADULT - PROBLEM SELECTOR PLAN 2
Pt w/ notable delusions/paranoia  -Believes people are causing his pain through "psychokinetics". Also refusing medications as states this is also causing his pain. Denies hallucinations. Denies SI  -Psych consulted; recs no further interventions

## 2023-01-10 NOTE — PROGRESS NOTE ADULT - ASSESSMENT
62M EtoH misuse c/b chronic pancreatitis, HCV s/p treatment (SVR), emphysema, benign esophageal stricture (s/p stent on 4/2022) admitted at [facility] w/ septic shock septic shock due to pseudomonal PNA, lung abscess, Morganella bacteremia and Klebsiella UTI (now s/p 6 wks zosyn ended 1/6) w/ hospital course c/b YUNIEL on CKD requiring HD and MICU stay, transferred here for intractable nausea/vomiting and esophageal stent removal, now s/p removal w/ EGD sig for esophagitis/gastric ulcer. Hospital course now c/b acute on chronic anemia

## 2023-01-10 NOTE — PROGRESS NOTE ADULT - ATTENDING COMMENTS
advanced CKD     acidosis refusing  Bicarbonate   anemia Low iron indices s/p IV Venofer and EMMANUEL dose last week. continue with EPO. titrate as needed once labs available    s/p cardiac cath .and fistula last week   needs labs . not volume overloaded. again encouraged him to let the staff get labs. Dr. Davey did as well at the bedside.   Assessment and plan as outlined above. Monitor labs and urine output. Avoid any potential nephrotoxins. Dose medications as per eGFR.  Further detailed plan of management and recommendation as outlined above by the renal fellow. advanced CKD     acidosis refusing  Bicarbonate   anemia Low iron indices s/p IV Venofer and EMMANUEL dose last week. continue with EPO. titrate as needed once labs available    s/p cardiac cath .and fistula last week   needs labs . not volume overloaded. again encouraged him to let the staff get labs. Dr. Arce did as well at the bedside.   Assessment and plan as outlined above. Monitor labs and urine output. Avoid any potential nephrotoxins. Dose medications as per eGFR.  Further detailed plan of management and recommendation as outlined above by the renal fellow.

## 2023-01-10 NOTE — PROGRESS NOTE ADULT - PROBLEM SELECTOR PLAN 5
Hx of benign appearing esophageal stricture s/p stent on 4/2022 by Dr. Erickson   - GI consulted now s/p stent removal 12/16, path neg h pylori, some metaplasia  - c/w Protonix 40 mg BID   -  Tolerating diet  - Rest of plan as above

## 2023-01-10 NOTE — PROGRESS NOTE ADULT - PROBLEM SELECTOR PLAN 1
Suspect GIB iso PUD and medication non-adherence. Uptrending BUN/Cr ratio. Refusing PO PPI and Carafate  -Previous EGD 12/16/22 w/ esophagitis  -s/p 1 unit PRBC on this admission  -Transfuse 1unit PRBC given Hgb < 7  -Switch PPI to IV BID  -C/w Carafate  -F/u post trans CBC  -GI re-eval if w/o adequate response to transfusion

## 2023-01-10 NOTE — PROGRESS NOTE ADULT - PROBLEM SELECTOR PLAN 3
Pt. with anemia suspected in setting of renal failure. Please check iron studies and ferritin levels. Monitor Hb Pt. with anemia suspected in setting of renal failure.

## 2023-01-10 NOTE — BH CONSULTATION LIAISON ASSESSMENT NOTE - LEGAL HISTORY
Was incarcerated over 25 years ago for drug-trafficking charges.  Denies being on parole/probation now.  Denies any pending legal issues.

## 2023-01-10 NOTE — PROGRESS NOTE ADULT - PROBLEM SELECTOR PLAN 2
Secondary hyperparathyroidism. Ionized calcium was low and was started on calcitriol 0.25mcg daily. Awaiting labs today. Check serum phos level. Continue with  Sevelamer 800mg TID with meals. Monitor phos levels.

## 2023-01-10 NOTE — BH CONSULTATION LIAISON ASSESSMENT NOTE - SUMMARY
Patient is a 61-yo AAM, single, reportedly domiciled with his mother, patient denying any prior psychiatric history (was treated in [facility] 25 years ago with haldol for unknown symptoms/diagnosis and never followed up after release from [facility]) and history of alcohol abuse (reports quitting "long time ago"), PMH of HCV, Hx of esophageal stricture (S/p stenting in 5/22), Emphysema, chronic pancreatitis who initially presented to [facility] on 11/25/22 for AMS. Pt was admitted to [facility] on 11/25 for AMS 2/2 Septic shock. Pt was initially intubated for acute respiratory failure and required pressors for hypotension. Pt's hospital course was complicated by YUNIEL/ATN requiring HD. Pt was extubated and was transferred to [facility] for esophageal stent removal. Nephrology team following for YUNIEL on CKD. Pt underwent cardiac cath on 1/4/23 and LUE AVF creation on 1/5/23. He has intractable hiccups being treated with chlorpromazine. Psychiatry was consulted as patient refused blood draws and also because of paranoia.     Patient is not suicidal or homicidal. Patient is not acutely depressed, manic, or psychotic and does not have debilitating anxiety. Patient is not a danger to self or others at this time. He would not benefit from inpatient psychiatric hospitalization. He does endorse some benign delusional thoughts, but they are not dangerous in nature. He declines any psychotropic medications to treat his delusions.     Patient is agreeing to do blood draws, asks the staff to be gentle with him with blood draws. He was frustrated with repeated attempts at blood draws as he is a hard stick. He understands his current clinical conditions, treatment recommendations, risks and benefits of treatments recommended and he agrees to comply with recommended treatment. He is alert, oriented X3. He has the capacity to refuse blood draws, but agreeing to it now.     Patient is psychiatrically cleared for medical disposition.     Re-consult PRN

## 2023-01-11 NOTE — PROGRESS NOTE ADULT - SUBJECTIVE AND OBJECTIVE BOX
[facility] DIVISION OF KIDNEY DISEASES AND HYPERTENSION -- FOLLOW UP NOTE  --------------------------------------------------------------------------------  HPI: Pt is a 62-year-old Male with Hx of esophageal stricture (S/p stenting in 5/22), Emphysema, HCV, chronic pancreatitis who initially presented to [facility] on 11/25/22 for AMS. Pt was admitted to [facility] on 11/25 for AMS 2/2 Septic shock. Pt was initially intubated for acute respiratory failure and required pressors for hypotension. Pt's hospital course was complicated by YUNIEL/ATN requiring HD. Pt was extubated and was transferred to [facility] for esophageal stent removal by Dr. Erickson. Initial labs on this admission concerning for elevated Scr. Nephrology team following for YUNIEL on CKD.     On review of [facility], it was noted that Scr was elevated at 2.20 in September 2022. Scr on admission was significantly elevated at 8.94 on 11/25/22. Pt received 1st HD session on 11/26/22. Last HD session was done on 12/1/22. HD catheter was removed as kidney function was recovering. Scr was elevated at 5.28 on transfer from [facility] on 12/15/22. Scr remains elevated/stable at 5-6 range. Scr was elevated/stable at 5.19 on 1/7/23. Scr remain at 4.63 today (1/11/23).     Pt. seen and examined this morning. Pt awake reports pain at AVF site, wishes to get oxycodone. Denies any chest pain, shortness of breath, or dizziness. Reports that diarrhea has resolved. Pt underwent cardiac cath on 1/4/23 and LUE AVF creation on 1/5/23.    PAST HISTORY  --------------------------------------------------------------------------------  No significant changes to PMH, PSH, FHx, SHx, unless otherwise noted    ALLERGIES & MEDICATIONS  --------------------------------------------------------------------------------  Allergies    Tylenol (Other)    Intolerances    Standing Inpatient Medications  calcitriol   Capsule 0.25 MICROGram(s) Oral daily  citric acid/sodium citrate Solution 15 milliLiter(s) Oral two times a day  epoetin mejia-epbx (RETACRIT) Injectable 36087 Unit(s) SubCutaneous <User Schedule>  folic acid 1 milliGRAM(s) Oral daily  heparin   Injectable 5000 Unit(s) SubCutaneous every 8 hours  multivitamin 1 Tablet(s) Oral daily  NIFEdipine XL 30 milliGRAM(s) Oral daily  pancrelipase  (CREON  6,000 Lipase Units) 1 Capsule(s) Oral three times a day with meals  pantoprazole  Injectable 40 milliGRAM(s) IV Push every 12 hours  sevelamer carbonate 800 milliGRAM(s) Oral three times a day  sodium bicarbonate 650 milliGRAM(s) Oral three times a day  sucralfate 1 Gram(s) Oral every 6 hours  thiamine 100 milliGRAM(s) Oral daily    PRN Inpatient Medications  ondansetron Injectable 4 milliGRAM(s) IV Push every 6 hours PRN  oxyCODONE    IR 7.5 milliGRAM(s) Oral every 4 hours PRN    REVIEW OF SYSTEMS  --------------------------------------------------------------------------------  Gen: No fevers , see HPI  Respiratory: No dyspnea  CV: No chest pain  GI: No abdominal pain  MSK: No  edema  Neuro: no dizziness   All other systems were reviewed and are negative, except as noted.    VITALS/PHYSICAL EXAM  --------------------------------------------------------------------------------  T(C): 36.7 (01-11-23 @ 10:02), Max: 36.9 (01-10-23 @ 17:46)  HR: 82 (01-11-23 @ 10:02) (82 - 94)  BP: 135/69 (01-11-23 @ 10:02) (125/66 - 141/72)  RR: 18 (01-11-23 @ 10:02) (16 - 19)  SpO2: 100% (01-11-23 @ 10:02) (100% - 100%)  Wt(kg): --    Weight (kg): 55.9 (01-11-23 @ 02:37)    01-11-23 @ 07:01  -  01-11-23 @ 14:56  --------------------------------------------------------  IN: 0 mL / OUT: 300 mL / NET: -300 mL    Physical Exam:  	Gen NAD  	HEENT: no JVD  	Pulm: CTABL  	CV: S1S2,  	Abd: Soft,   	Ext:   - edema B/L LE   	Neuro: Awake and alert  	Skin: Warm and dry              Vascular: DARYL WILLIS + polly    LABS/STUDIES  --------------------------------------------------------------------------------              7.3    7.52  >-----------<  239      [01-11-23 @ 06:55]              22.8     139  |  107  |  73  ----------------------------<  91      [01-11-23 @ 06:55]  3.8   |  17  |  4.63        Ca     7.7     [01-11-23 @ 06:55]      Mg     2.00     [01-11-23 @ 06:55]      Phos  6.7     [01-11-23 @ 06:55]    Creatinine Trend:  SCr 4.63 [01-11 @ 06:55]  SCr 4.78 [01-11 @ 01:00]  SCr 4.68 [01-10 @ 14:34]  SCr 5.19 [01-07 @ 06:54]  SCr 5.40 [01-05 @ 02:43]    Urinalysis - [12-18-22 @ 00:40]      Color Light Yellow / Appearance Clear / SG 1.013 / pH 6.0      Gluc Negative / Ketone Negative  / Bili Negative / Urobili <2 mg/dL       Blood Trace / Protein 30 mg/dL / Leuk Est Negative / Nitrite Negative      RBC 2 / WBC 1 / Hyaline  / Gran  / Sq Epi  / Non Sq Epi 1 / Bacteria Negative    Iron 104, TIBC TNP, %sat --      [01-03-23 @ 06:12]  Ferritin 311      [01-03-23 @ 06:12]  PTH -- (Ca --)      [12-22-22 @ 06:40]   290  Vitamin D (25OH) 10.4      [01-04-23 @ 06:45]  HbA1c 5.0      [03-22-19 @ 09:03]  TSH 1.380      [09-01-22 @ 08:58]  Lipid: chol 147, TG 88, HDL 59, LDL --      [09-01-22 @ 08:58]

## 2023-01-11 NOTE — PROGRESS NOTE ADULT - PROBLEM SELECTOR PLAN 1
Pt with YUNIEL on CKD likely multifactorial in the setting of recent COVID infection, poor oral intake. Scr was elevated at 2.20 in September 2022. Pt was recently admitted at [facility]. Scr on admission was significantly elevated at 8.94 on 11/25/22. Pt received 1st HD session on 11/26/22. Last HD session was done on 12/1/22. HD catheter was removed as kidney function was recovering. Scr was elevated at 5.28 on transfer from [facility] on 12/15/22. Scr remains elevated/stable at 5.40 today (1/5/23). No urgent indication for HD today. Of note kidney sonogram had discrepant size renal artery duplex showing HESHAM but BP well controlled. Discussed with Dr. Dukes (12/21) who knows the patient very well and has seen him inpatient and outpatient over the last several years. As per Dr. Hanks the patient's baseline creatinine is 3 and has advanced CKD and is nearing ESKD. Pt underwent cardiac cath on 1/4/23 , tolerated procedure well. Underwent LUE AVF creation  on 1/5/23. Last Scr is elevated/stable at 4.63 today (1/11/23). Monitor labs and urine output. Avoid any potential nephrotoxins. Dose medications as per eGFR.

## 2023-01-11 NOTE — PROGRESS NOTE ADULT - PROBLEM SELECTOR PLAN 6
Septic shock due to pseudomonal PNA with lung abscess, morganella bacteremia and kelbsiella UTI.  Was on Zosyn since 11/25, as per ID in [facility] who recommended 4 week course of antibiotics versus possibly longer pending imaging at 4 weeks (12/23)  - CXR 12/23 - Stable RUL lesion, with new RLL pneumonia   - seen by ID  - completed 6 wks zosyn 1/6  - per d/w ID no further imaging as 6 wks abx sufficient treatment

## 2023-01-11 NOTE — ADVANCED PRACTICE NURSE CONSULT - RECOMMEDATIONS
Topical recommendations:   Recommend use of condom cathter for urinary incontinence.    Sacral spine- Cleanse with NS, pat dry. Apply Liquid barrier film to periwound skin (allow to dry). Cover with silicone foam with border.     Perineum extending to gluteal fold and bilateral buttock- Cleanse with soap and water, pat dry. Apply Jorge Luis antifungal moisture barrier cream twice a day or PRN with episodes of incontinence.     Dry flaky skin- Cleanse skin well with mild soap and water, pat dry. Apply Sween 24 moisturizer daily.     Right second and third toe- Cleanse well with normal saline, apply LBF to periwound skin, Wrap thin strip of Aquacel hydrofiber and gauze in between toes, secure with tape.     Continue low air loss bed therapy,  heel elevation with offloading boots, turn & reposition q2h with air fluidized pillow, continue moisture management with barrier creams as specified above & single breathable pad, continue measures to decrease friction/shear.   Plan discussed with patient- educated on topical wound therapy to optimize wound healing. Questions answered.     Please contact Wound/Ostomy Care Service Line if we can be of further assistance (ext [phone]).      Topical recommendations:   Recommend use of condom cathter for urinary incontinence.    Sacral spine- Cleanse with NS, pat dry. Apply Liquid barrier film to periwound skin (allow to dry). Cover with silicone foam with border.     Perineum extending to gluteal fold and bilateral buttock- Cleanse with soap and water, pat dry. Apply Jorge Luis antifungal moisture barrier cream twice a day or PRN with episodes of incontinence.     Dry flaky skin- Cleanse skin well with mild soap and water, pat dry. Apply Sween 24 moisturizer daily.     Right second and third toe- Cleanse well with normal saline, apply LBF to periwound skin, Wrap thin strip of Aquacel hydrofiber and gauze in between toes, secure with tape. Recommend Antifungal for possible tinea pedis     Continue low air loss bed therapy,  heel elevation with offloading boots, turn & reposition q2h with air fluidized pillow, continue moisture management with barrier creams as specified above & single breathable pad, continue measures to decrease friction/shear.   Plan discussed with patient- educated on topical wound therapy to optimize wound healing. Questions answered.     Recommendations discussed with Carla Velasco ACP team.    Please contact Wound/Ostomy Care Service Line if we can be of further assistance (ext [phone]).

## 2023-01-11 NOTE — PROGRESS NOTE ADULT - SUBJECTIVE AND OBJECTIVE BOX
Gastroenterology/Hepatology Progress Note      Interval Events:   GI re-consulted for acute on chronic anemia.     Patient hgb trended down to 6.7, with no overt signs of GI bleeding. Pt. reported he had brown stool this morning, denied abd pain, nausea, vomiting, diarrhea, constipation, fevers and chills. No hematochezia. Tolerating liquid diet well although has decreased appetite due to chronic abd pain which is unchanged from before.     Allergies:  Tylenol (Other)      Hospital Medications:  calcitriol   Capsule 0.25 MICROGram(s) Oral daily  citric acid/sodium citrate Solution 15 milliLiter(s) Oral two times a day  epoetin mejia-epbx (RETACRIT) Injectable 98142 Unit(s) SubCutaneous <User Schedule>  folic acid 1 milliGRAM(s) Oral daily  heparin   Injectable 5000 Unit(s) SubCutaneous every 8 hours  multivitamin 1 Tablet(s) Oral daily  NIFEdipine XL 30 milliGRAM(s) Oral daily  ondansetron Injectable 4 milliGRAM(s) IV Push every 6 hours PRN  oxyCODONE    IR 7.5 milliGRAM(s) Oral every 4 hours PRN  pancrelipase  (CREON  6,000 Lipase Units) 1 Capsule(s) Oral three times a day with meals  pantoprazole  Injectable 40 milliGRAM(s) IV Push every 12 hours  sevelamer carbonate 800 milliGRAM(s) Oral three times a day  sodium bicarbonate 650 milliGRAM(s) Oral three times a day  sucralfate 1 Gram(s) Oral every 6 hours  thiamine 100 milliGRAM(s) Oral daily      ROS: 14 point ROS negative unless otherwise state in subjective    PHYSICAL EXAM:   Vital Signs:  Vital Signs Last 24 Hrs  T(C): 36.7 (11 Jan 2023 10:02), Max: 36.9 (10 Josiah 2023 17:46)  T(F): 98 (11 Jan 2023 10:02), Max: 98.5 (10 Josiah 2023 17:46)  HR: 82 (11 Jan 2023 10:02) (82 - 94)  BP: 135/69 (11 Jan 2023 10:02) (125/66 - 141/72)  BP(mean): --  RR: 18 (11 Jan 2023 10:02) (16 - 19)  SpO2: 100% (11 Jan 2023 10:02) (100% - 100%)    Parameters below as of 11 Jan 2023 10:02  Patient On (Oxygen Delivery Method): room air      Daily     Daily     GENERAL:  No acute distress, chronically ill appearing, sitting on the bed.   HEENT:  NCAT, no scleral icterus  CHEST: no resp distress  ABDOMEN:  Soft, well healed surgical scar, non-tender, non-distended, no masses  EXTREMITIES:  No LE edema  SKIN:  No rash/erythema/ecchymoses/petechiae/wounds/abscess/warm/dry  NEURO:  Alert and oriented x 3, no tremors  PSYCH: normal affect, slow response to questions but cooperative.     LABS:                        7.3    7.52  )-----------( 239      ( 11 Jan 2023 06:55 )             22.8     Mean Cell Volume: 93.4 fL (01-11-23 @ 06:55)    01-11    139  |  107  |  73<H>  ----------------------------<  91  3.8   |  17<L>  |  4.63<H>    Ca    7.7<L>      11 Jan 2023 06:55  Phos  6.7     01-11  Mg     2.00     01-11        Imaging:    Upper endoscopy on 12/16/22    Findings:       An esophageal stent was found in the lower third of the esophagus at 36        cm from the incisors. No evidence of esophageal obstruction. Stent        traversed the GE junction into the gastric cardia. Stent removal was        accomplished with a rat-toothed forceps.       LA Grade D (one or more mucosal breaks involving at least 75% of        esophageal circumference) esophagitis with no bleeding was found 26 cm        from the incisors.       One large non-bleeding cratered gastric ulcer with no stigmata of        bleeding was found in the cardia after esophageal stent removal, likely        2/2 ulceration from the stent. No high risk stigmata seen.       Patchy mildly erythematous mucosa without bleedingwas found at the        pylorus. Biopsies were taken with a cold forceps for histology.       Patchy mildly congested and erythematous mucosa without active bleeding        and with no stigmata of bleeding was found in the duodenal bulb, in the    first portion of the duodenum and in the second portion of the duodenum.        Biopsies were taken with a cold forceps for histology from the first        portion of the duodenum.               Impression:          - Pre-existing esophageal stent, removed.                       - LA Grade D esophagitis.                       - Erythematous mucosa in the pylorus. Biopsied.                       - Congested duodenal mucosa. Biopsied.           Gastroenterology/Hepatology Progress Note    Interval Events:   GI re-consulted for acute on chronic anemia.     Patient hgb trended down to 6.7, with no overt signs of GI bleeding. Pt. reported he had brown stool this morning, denied abd pain, nausea, vomiting, diarrhea, constipation, fevers and chills. No hematochezia. Tolerating liquid diet well although has decreased appetite due to chronic abd pain which is unchanged from before.     Allergies:  Tylenol (Other)      Hospital Medications:  calcitriol   Capsule 0.25 MICROGram(s) Oral daily  citric acid/sodium citrate Solution 15 milliLiter(s) Oral two times a day  epoetin mejia-epbx (RETACRIT) Injectable 12423 Unit(s) SubCutaneous <User Schedule>  folic acid 1 milliGRAM(s) Oral daily  heparin   Injectable 5000 Unit(s) SubCutaneous every 8 hours  multivitamin 1 Tablet(s) Oral daily  NIFEdipine XL 30 milliGRAM(s) Oral daily  ondansetron Injectable 4 milliGRAM(s) IV Push every 6 hours PRN  oxyCODONE    IR 7.5 milliGRAM(s) Oral every 4 hours PRN  pancrelipase  (CREON  6,000 Lipase Units) 1 Capsule(s) Oral three times a day with meals  pantoprazole  Injectable 40 milliGRAM(s) IV Push every 12 hours  sevelamer carbonate 800 milliGRAM(s) Oral three times a day  sodium bicarbonate 650 milliGRAM(s) Oral three times a day  sucralfate 1 Gram(s) Oral every 6 hours  thiamine 100 milliGRAM(s) Oral daily      ROS: 14 point ROS negative unless otherwise state in subjective    PHYSICAL EXAM:   Vital Signs:  Vital Signs Last 24 Hrs  T(C): 36.7 (11 Jan 2023 10:02), Max: 36.9 (10 Josiah 2023 17:46)  T(F): 98 (11 Jan 2023 10:02), Max: 98.5 (10 Josiah 2023 17:46)  HR: 82 (11 Jan 2023 10:02) (82 - 94)  BP: 135/69 (11 Jan 2023 10:02) (125/66 - 141/72)  BP(mean): --  RR: 18 (11 Jan 2023 10:02) (16 - 19)  SpO2: 100% (11 Jan 2023 10:02) (100% - 100%)    Parameters below as of 11 Jan 2023 10:02  Patient On (Oxygen Delivery Method): room air      Daily     Daily     GENERAL:  No acute distress, chronically ill appearing, sitting on the bed.   HEENT:  NCAT, no scleral icterus  CHEST: no resp distress  ABDOMEN:  Soft, well healed surgical scar, non-tender, non-distended, no masses  EXTREMITIES:  No LE edema  SKIN:  No rash/erythema/ecchymoses/petechiae/wounds/abscess/warm/dry  NEURO:  Alert and oriented x 3, no tremors  PSYCH: normal affect, slow response to questions but cooperative.     LABS:                        7.3    7.52  )-----------( 239      ( 11 Jan 2023 06:55 )             22.8     Mean Cell Volume: 93.4 fL (01-11-23 @ 06:55)    01-11    139  |  107  |  73<H>  ----------------------------<  91  3.8   |  17<L>  |  4.63<H>    Ca    7.7<L>      11 Jan 2023 06:55  Phos  6.7     01-11  Mg     2.00     01-11        Imaging:    Upper endoscopy on 12/16/22    Findings:       An esophageal stent was found in the lower third of the esophagus at 36        cm from the incisors. No evidence of esophageal obstruction. Stent        traversed the GE junction into the gastric cardia. Stent removal was        accomplished with a rat-toothed forceps.       LA Grade D (one or more mucosal breaks involving at least 75% of        esophageal circumference) esophagitis with no bleeding was found 26 cm        from the incisors.       One large non-bleeding cratered gastric ulcer with no stigmata of        bleeding was found in the cardia after esophageal stent removal, likely        2/2 ulceration from the stent. No high risk stigmata seen.       Patchy mildly erythematous mucosa without bleedingwas found at the        pylorus. Biopsies were taken with a cold forceps for histology.       Patchy mildly congested and erythematous mucosa without active bleeding        and with no stigmata of bleeding was found in the duodenal bulb, in the    first portion of the duodenum and in the second portion of the duodenum.        Biopsies were taken with a cold forceps for histology from the first        portion of the duodenum.               Impression:          - Pre-existing esophageal stent, removed.                       - LA Grade D esophagitis.                       - Erythematous mucosa in the pylorus. Biopsied.                       - Congested duodenal mucosa. Biopsied.

## 2023-01-11 NOTE — PROGRESS NOTE ADULT - ASSESSMENT
62M EtoH misuse c/b chronic pancreatitis, HCV s/p treatment (SVR), emphysema, benign esophageal stricture (s/p stent on 4/2022) admitted at [facility] w/ septic shock septic shock due to pseudomonal PNA, lung abscess, Morganella bacteremia and Klebsiella UTI (now s/p 6 wks zosyn ended 1/6) w/ hospital course c/b YUNIEL on CKD requiring HD and MICU stay, transferred here for intractable nausea/vomiting and esophageal stent removal, now s/p removal w/ EGD sig for esophagitis/gastric ulcer. Hospital course now c/b acute on chronic anemia

## 2023-01-11 NOTE — PROGRESS NOTE ADULT - PROBLEM SELECTOR PLAN 2
Secondary hyperparathyroidism. Ionized calcium was low. Continue with calcitriol 0.25mcg daily. Increase Sevelamer to 1600mg mg TID with meals. Monitor phos levels.

## 2023-01-11 NOTE — PROGRESS NOTE ADULT - PROBLEM SELECTOR PLAN 3
Pt. with anemia suspected in setting of renal failure. Received IV iron. Currently on Epo 10K three times a week. Monitor Hb.

## 2023-01-11 NOTE — PROGRESS NOTE ADULT - PROBLEM SELECTOR PLAN 4
Pt. with acidosis in setting of renal failure. Serum CO2 low at 12 on 1/7/23. Continue with Bicitra 15ml PO BID and bicarb tabs 650 gm OP TID. SCO2 improved to 17 today(1/11/23). Monitor SCO2    If you have any questions, please feel free to contact me  Jeff Pete  Nephrology Fellow  [phone]/ [employer] Teams(Preferred)  (After 5pm or on weekends please page the on-call fellow).

## 2023-01-11 NOTE — PROGRESS NOTE ADULT - ASSESSMENT
Pt is a 63 yo M w/ PMHx benign esophageal stricture s/p fully covered esophageal stent placement 4/19/22 c/b stent migration w/ repeat EDGD 4/29/22 for stricture repositioning and suturing, HCV, chronic pancreatitis, emphysema presenting as transfer from [facility] with nausea/vomiting. Patient hospitalized there 11/25-12/14 for septic shock 2/2 pseudomonal PNA, lung abscess, Morganella bacteremia, Klebsiella UTI. Pt required intubation for RF and was on pressors. Pt's hospital course was complicated by YUNIEL due to septic ATN requiring 3 rounds of HD. Pt is on now extubated, on 4L of NC, off pressors. Pt developed intractable N/V during his hospital stay and was transferred to [facility] for evaluation of esophageal stent removal.  Pt was also found to be COVID + on 12/13/22, now off isolation. Had repeat EGD on 12/16 w/ removal of the esophageal stent, found to have non bleeding large gastric ulcer and grade D esophagitis. Now GI re-consulted for acute on chronic anemia.     #Acute on chronic normocytic anemia  Baseline hgb around 9-8 after the EGD last month, slow trend down to 6.7, w/ no overt signs of GI bleeding. As per chart review, has not been complaint with PPI/sucralfate, could be due to severe esophagitis or gastric ulcer. Would continue w/ conservative management for now. But if he continues to have decreasing trend, would need to consider both EGD and colonoscopy.     #Epigastric abdominal pain, likely 2/2 chronic pancreatitis. Lipase 11 on 12/13, in 400s at time of admission to [facility] on 11/25. CT A/P 12/5 shows diffuse calcifications throughout the pancreas consistent with chronic calcific pancreatitis. Stable.     #Esophageal stricture: Patient with hx of esophageal stricture requiring stent placement on 4/19/2022. Post-procedural course c/b hematemesis with stent malposition requiring EGD for stent repositioning and suture on 4/29/2022 with sloughing of esophageal mucosa noted.    - s/p EGD 12/16: Pre-existing esophageal stent removed. LA Grade D esophagitis. Erythematous mucosa in the pylorus. Biopsied. Congested duodenal mucosa. Path chronic duodenitis w/ gastric metaplasia and gastric bx showed no h. pylori.     #Nausea/vomiting: Pt denies n/v currently, however noted at OSH. CT A/P w/ po contrast on 12/5 shows esophageal stent in place partially filled with debris/ingested material.  - tolerating liquid diet at this time.     #YUNIEL  Neph consulted, concerned that he might be progressing to ESRD.     Recommendations:   - Continue with IV PPI BID and sucralfate 1 gram Q6 hours.   - Will continue w/ conservative management for now.   - If hgb continues to trend down, will consider EGD and colonoscopy on 1/13.   - Monitor CBC [id number] and transfuse if hgb < 7.   - Will need to be on CLD tomorrow morning for potential colonoscopy on 1/13.     GI will continue to follow.     All recommendations are tentative until note is attested by an attending.     Tyson Urbano, PGY-4  Gastroenterology/Hepatology Fellow  Available on Microsoft Teams  [phone] (Short Range Pager)  [phone] (Long Range Pager)    After 5pm, please contact the on-call GI fellow. [phone]         Pt is a 61 yo M w/ PMHx benign esophageal stricture s/p fully covered esophageal stent placement 4/19/22 c/b stent migration w/ repeat EDGD 4/29/22 for stricture repositioning and suturing, HCV, chronic pancreatitis, emphysema presenting as transfer from [facility] with nausea/vomiting. Patient hospitalized there 11/25-12/14 for septic shock 2/2 pseudomonal PNA, lung abscess, Morganella bacteremia, Klebsiella UTI. Pt required intubation for RF and was on pressors. Pt's hospital course was complicated by YUNIEL due to septic ATN requiring 3 rounds of HD. Pt is on now extubated, on 4L of NC, off pressors. Pt developed intractable N/V during his hospital stay and was transferred to [facility] for evaluation of esophageal stent removal.  Pt was also found to be COVID + on 12/13/22, now off isolation. Had repeat EGD on 12/16 w/ removal of the esophageal stent, found to have non bleeding large gastric ulcer and grade D esophagitis. Now GI re-consulted for acute on chronic anemia.     #Acute on chronic normocytic anemia  Baseline hgb around 9-8 after the EGD last month, slow trend down to 6.7, w/ no overt signs of GI bleeding. As per chart review, has not been complaint with PPI/sucralfate, could be due to severe esophagitis or gastric ulcer. Would continue w/ conservative management for now. But if he continues to have decreasing trend, would need to consider both EGD and colonoscopy (to rule out colonic source of bleeding).     #Epigastric abdominal pain, likely 2/2 chronic pancreatitis. Lipase 11 on 12/13, in 400s at time of admission to [facility] on 11/25. CT A/P 12/5 shows diffuse calcifications throughout the pancreas consistent with chronic calcific pancreatitis. Stable.     #Esophageal stricture: Patient with hx of esophageal stricture requiring stent placement on 4/19/2022. Post-procedural course c/b hematemesis with stent malposition requiring EGD for stent repositioning and suture on 4/29/2022 with sloughing of esophageal mucosa noted.    - s/p EGD 12/16: Pre-existing esophageal stent removed. LA Grade D esophagitis. Erythematous mucosa in the pylorus. Biopsied. Congested duodenal mucosa. Path chronic duodenitis w/ gastric metaplasia and gastric bx showed no h. pylori.     #Nausea/vomiting: Pt denies n/v currently, however noted at OSH. CT A/P w/ po contrast on 12/5 shows esophageal stent in place partially filled with debris/ingested material.  - tolerating liquid diet at this time.     #YUNIEL  Neph consulted, concerned that he might be progressing to ESRD.     Recommendations:   - Continue with IV PPI BID and sucralfate 1 gram Q6 hours.   - Will continue w/ conservative management for now.   - If hgb continues to trend down, will consider EGD and colonoscopy on 1/13.   - Monitor CBC [id number] and transfuse if hgb < 7.   - Will need to be on CLD tomorrow morning for potential colonoscopy on 1/13.     GI will continue to follow.     All recommendations are tentative until note is attested by an attending.     Tyson Urbano, PGY-4  Gastroenterology/Hepatology Fellow  Available on Microsoft Teams  [phone] (Short Range Pager)  [phone] (Long Range Pager)    After 5pm, please contact the on-call GI fellow. [phone]

## 2023-01-11 NOTE — ADVANCED PRACTICE NURSE CONSULT - ASSESSMENT
General: A&Ox3-4, turns independently, incontinent of urine and stool, patient insistent on wearing a diaper for protection, urinal at bedside. Patient cachectic with muscle wasting, prominent bony prominences. Skin warm, dry, flaky. Scattered areas of hypopigmentation to right knee, right toes and coccyx, hyperpigmented midline abdominal scar.     Sacral spine- Stage 2 pressure injury complicated by frictional forces measuring 2.5cmx1.5cmx0.2cm exposing fibrinous base 80% able to be removed while cleansing, remaining wound base with Friable dermis, moderate sanguinous drainage. No odor. Periwound skin with hypo/hyperpigmentation, prominent sacral spine. Regular borders.  No induration, no erythema, no increased warmth.  Goals of care: Maintain moist wound bed, autolytic debridement, protect from friction and shear, continue to offload.     Perineum extending to gluteal fold and bilateral buttock with incontinence associated dermatitis with suspected candidiasis presenting with hyperpigmentation with darkened due and dry flaky skin at the periphery. Irregular borders. Hypopigmentation to left inner buttock.     Vascular: Bilateral lower extremities with scattered areas of hyper and hypopigmentation. Severe Dry, flaky skin to bilateral feet worse on right foot. Thickened fungal toenails. Right second toe avulsion.  No temperature changes noted. No Edema. Capillary refill <3 seconds. Palpable DP/PT pulses, with  biphasic doppler sounds.     Right second and third toe- Upon assessment two toes noted to be fused together by dried serosanguinous drainage. Upon cleansing toes able to be disconnected and two wounds noted to bilateral toes. Right lateral second toe measuring 9qod1epi4.2cm exposing pink moist dermis and right medial third toe measuring 4biq5efe0.2cm exposing pink moist dermis. Periwound skin with maceration. No s/s of infection. Goals of care: manage/absorb exudate, protect periwound skin, moist wound healing.  General: A&Ox3-4, turns independently, incontinent of urine and stool, patient insistent on wearing a diaper for protection, urinal at bedside. Patient cachectic with muscle wasting, prominent bony prominences. Skin warm, dry, flaky. Scattered areas of hypopigmentation to right knee, right toes and coccyx, hyperpigmented midline abdominal scar.     Sacral spine- Stage 2 pressure injury complicated by frictional forces measuring 2.5cmx1.5cmx0.2cm exposing fibrinous base 80% able to be removed while cleansing, remaining wound base with Friable dermis, moderate sanguinous drainage. No odor. Periwound skin with hypo/hyperpigmentation, prominent sacral spine. Regular borders.  No induration, no erythema, no increased warmth.  Goals of care: Maintain moist wound bed, autolytic debridement, protect from friction and shear, continue to offload.     Perineum extending to gluteal fold and bilateral buttock with incontinence associated dermatitis with suspected candidiasis presenting with hyperpigmentation with darkened due and dry flaky skin at the periphery. Irregular borders. Hypopigmentation to left inner buttock.     Vascular: Bilateral lower extremities with scattered areas of hyper and hypopigmentation. Severe Dry, flaky skin to bilateral feet worse on right foot possible tinea pedis. Thickened fungal toenails. Right second toe avulsion.  No temperature changes noted. No Edema. Capillary refill <3 seconds. Palpable DP/PT pulses, with  biphasic doppler sounds.     Right second and third toe- Upon assessment two toes noted to be fused together by dried serosanguinous drainage. Upon cleansing toes able to be disconnected and two wounds noted to bilateral toes. Right lateral second toe measuring 8fuo5yec9.2cm exposing pink moist dermis and right medial third toe measuring 0lar8dom0.2cm exposing pink moist dermis. Periwound skin with maceration. No s/s of infection. Goals of care: manage/absorb exudate, protect periwound skin, moist wound healing.

## 2023-01-11 NOTE — PROGRESS NOTE ADULT - PROBLEM SELECTOR PLAN 1
Suspect GIB iso PUD and medication non-adherence. Uptrending BUN/Cr ratio. Refusing PO PPI and Carafate  -Previous EGD 12/16/22 w/ esophagitis  -s/p 2 units PRBC on this admission  -c/w IV PPI BID  -C/w Carafate  -GI re-consulted, pending possible EGD/Colonoscopy 1/13

## 2023-01-11 NOTE — PROGRESS NOTE ADULT - PROBLEM SELECTOR PLAN 3
S/p HD X 3  in ICU in [facility], Cr plateaued at 5.7-6, off HD now; per renal plan for AVF creation on this admission as per d/w OP nephrologist baseline Cr is 3 and nearing ESRD  - avoid nephrotoxic, renally dose meds, trend Cr  - VA renal duplex with severe stenosis of the left renal artery; vascular consulted, no plan for intervention for HESHAM  - sevelamer 800 mg TID & bicarb 650 mg TID;  calcitriol 0.25 for secondary hyperparathyroidism per renal  - s/p AVF w/ vascular on 1/5 to the L arm, L arm precautions

## 2023-01-11 NOTE — PROGRESS NOTE ADULT - ATTENDING COMMENTS
62 year old male with Hx of esophageal stricture (s/p stent on 5/2022), Emphysema, HCV, chronic pancreatitis, presenting from [facility] for esophageal stent removal due to intractable n/v. Pt was admitted to [facility] on 11/25 for AMS 2/2 Septic shock due to pseudomonal PNA, Lung abscess, morganella bacteremia and klebsiella UTI. Pt was initially intubated for acute respiratory failure and required pressors for hypotension. Pt's hospital course was complicated by YUNIEL due to septic ATN requiring 3 rounds of HD.  Patient s/p EGD with stent removal on 12/16/22.  GI now reconsulted for acute on chronic anemia.    1.  Acute on chronic anemia.  Likely multifactorial related to CKD, chronic illness, chronic blood draws.  No overt bleeding but patient may have occult bleeding from esophagitis, erosion, ulcer, colonic lesions.  2.  Chronic pancreatitis.    recs:  - Monitor Hb daily, transfuse to Hb>7.  - PPI BID.  - Diet as tolerated.  - Will consider repeat EGD +/- colonoscopy pending clinical course.

## 2023-01-11 NOTE — PROGRESS NOTE ADULT - SUBJECTIVE AND OBJECTIVE BOX
Priya Arce     62y old  Male who presents with a chief complaint of intractable N/V , esophageal stent removal (11 Jan 2023 13:16)      SUBJECTIVE / OVERNIGHT EVENTS: No acute overnight events. This morning pt without complaints.  Denies fevers, chills, nausea, vomiting, chest pain, SOB..     MEDICATIONS  (STANDING):  calcitriol   Capsule 0.25 MICROGram(s) Oral daily  citric acid/sodium citrate Solution 15 milliLiter(s) Oral two times a day  epoetin mejia-epbx (RETACRIT) Injectable 60565 Unit(s) SubCutaneous <User Schedule>  folic acid 1 milliGRAM(s) Oral daily  heparin   Injectable 5000 Unit(s) SubCutaneous every 8 hours  multivitamin 1 Tablet(s) Oral daily  NIFEdipine XL 30 milliGRAM(s) Oral daily  pancrelipase  (CREON  6,000 Lipase Units) 1 Capsule(s) Oral three times a day with meals  pantoprazole  Injectable 40 milliGRAM(s) IV Push every 12 hours  sevelamer carbonate 800 milliGRAM(s) Oral three times a day  sodium bicarbonate 650 milliGRAM(s) Oral three times a day  sucralfate 1 Gram(s) Oral every 6 hours  thiamine 100 milliGRAM(s) Oral daily    MEDICATIONS  (PRN):  ondansetron Injectable 4 milliGRAM(s) IV Push every 6 hours PRN Nausea and/or Vomiting  oxyCODONE    IR 7.5 milliGRAM(s) Oral every 4 hours PRN mod-severe pain    Allergies    Tylenol (Other)    Intolerances        Vital Signs Last 24 Hrs  T(C): 36.7 (11 Jan 2023 10:02), Max: 36.9 (10 Josiah 2023 17:46)  T(F): 98 (11 Jan 2023 10:02), Max: 98.5 (10 Josiah 2023 17:46)  HR: 82 (11 Jan 2023 10:02) (82 - 94)  BP: 135/69 (11 Jan 2023 10:02) (125/66 - 141/72)  BP(mean): --  RR: 18 (11 Jan 2023 10:02) (16 - 19)  SpO2: 100% (11 Jan 2023 10:02) (100% - 100%)    Parameters below as of 11 Jan 2023 10:02  Patient On (Oxygen Delivery Method): room air      Daily     Daily   CAPILLARY BLOOD GLUCOSE        I&O's Summary    11 Jan 2023 07:01  -  11 Jan 2023 15:03  --------------------------------------------------------  IN: 0 mL / OUT: 300 mL / NET: -300 mL        PHYSICAL EXAM:  GENERAL: NAD, thin  CHEST/LUNG: Clear to auscultation bilaterally; No wheeze  HEART: Regular rate and rhythm; No murmurs, rubs, or gallops  ABDOMEN: Soft, Nontender, Nondistended; Bowel sounds present  EXTREMITIES:   warm and well perfused, No clubbing, cyanosis, or edema; R elbow swelling in dependent area   PSYCH: AAOx3, odd affect, a bit guarded  NEUROLOGY: non-focal  L AVF dressing c/d/i    LABS:                        7.3    7.52  )-----------( 239      ( 11 Jan 2023 06:55 )             22.8     Hgb Trend: 7.3<--, 7.2<--, 6.7<--, 7.2<--  01-11    139  |  107  |  73<H>  ----------------------------<  91  3.8   |  17<L>  |  4.63<H>    Ca    7.7<L>      11 Jan 2023 06:55  Phos  6.7     01-11  Mg     2.00     01-11      Creatinine Trend: 4.63<--, 4.78<--, 4.68<--, 5.19<--, 5.40<--, 5.75<--              RADIOLOGY & ADDITIONAL TESTS:    Imaging Personally Reviewed.    Consultant(s) Notes Reviewed.    Care Discussed with Consultants/Other Providers.

## 2023-01-12 NOTE — PROGRESS NOTE ADULT - PROBLEM SELECTOR PLAN 6
Septic shock due to pseudomonal PNA with lung abscess, morganella bacteremia and kelbsiella UTI.  Was on Zosyn since 11/25, [facility] 4 week course of antibiotics versus possibly longer pending imaging at 4 weeks (12/23)  - CXR 12/23 - Stable RUL lesion, with new RLL pneumonia   - seen by ID  - completed 6 wks zosyn 1/6  - per d/w ID no further imaging as 6 wks abx sufficient treatment

## 2023-01-12 NOTE — PROGRESS NOTE ADULT - ASSESSMENT
Pt is a 61 yo M w/ PMHx benign esophageal stricture s/p fully covered esophageal stent placement 4/19/22 c/b stent migration w/ repeat EDGD 4/29/22 for stricture repositioning and suturing, HCV, chronic pancreatitis, emphysema presenting as transfer from [facility] with nausea/vomiting. Patient hospitalized there 11/25-12/14 for septic shock 2/2 pseudomonal PNA, lung abscess, Morganella bacteremia, Klebsiella UTI. Pt required intubation for RF and was on pressors. Pt's hospital course was complicated by YUNIEL due to septic ATN requiring 3 rounds of HD. Pt is on now extubated, on 4L of NC, off pressors. Pt developed intractable N/V during his hospital stay and was transferred to [facility] for evaluation of esophageal stent removal.  Pt was also found to be COVID + on 12/13/22, now off isolation. Had repeat EGD on 12/16 w/ removal of the esophageal stent, found to have non bleeding large gastric ulcer and grade D esophagitis. Now GI re-consulted for acute on chronic anemia.     #Acute on chronic normocytic anemia  Baseline hgb around 9-8 after the EGD last month, slow trend down to 6.7, w/ no overt signs of GI bleeding. As per chart review, has not been complaint with PPI/sucralfate, could be due to severe esophagitis or gastric ulcer. Would continue w/ conservative management for now. But if he continues to have decreasing trend, would need to consider both EGD and colonoscopy (to rule out colonic source of bleeding).   - Pt. was very frustrated this morning, wanted to get the procedures today. Will need to discuss w/ his daughter but potential EGD/colonoscopy tomorrow.     #Epigastric abdominal pain, likely 2/2 chronic pancreatitis. Lipase 11 on 12/13, in 400s at time of admission to [facility] on 11/25. CT A/P 12/5 shows diffuse calcifications throughout the pancreas consistent with chronic calcific pancreatitis. Stable.     #Esophageal stricture: Patient with hx of esophageal stricture requiring stent placement on 4/19/2022. Post-procedural course c/b hematemesis with stent malposition requiring EGD for stent repositioning and suture on 4/29/2022 with sloughing of esophageal mucosa noted.    - s/p EGD 12/16: Pre-existing esophageal stent removed. LA Grade D esophagitis. Erythematous mucosa in the pylorus. Biopsied. Congested duodenal mucosa. Path chronic duodenitis w/ gastric metaplasia and gastric bx showed no h. pylori.     #Nausea/vomiting: Pt denies n/v currently, however noted at OSH. CT A/P w/ po contrast on 12/5 shows esophageal stent in place partially filled with debris/ingested material.  - tolerating liquid diet at this time.     #YUNIEL  Neph consulted, concerned that he might be progressing to ESRD.     Recommendations:   - Continue with IV PPI BID and sucralfate 1 gram Q6 hours.   - Will proceed w/ EGD and colonoscopy tomorrow.   - Keep him on CLD today.   - Will order the Family Petly prep this evening.   - COVID test neg on 1/9  - Monitor CBC [id number] and transfuse if hgb < 7.     GI will continue to follow.     All recommendations are tentative until note is attested by an attending.     Tyson Urbano, PGY-4  Gastroenterology/Hepatology Fellow  Available on Microsoft Teams  [phone] (Short Range Pager)  [phone] (Long Range Pager)    After 5pm, please contact the on-call GI fellow. [phone]

## 2023-01-12 NOTE — PROGRESS NOTE ADULT - ATTENDING COMMENTS
62 year old male with Hx of esophageal stricture (s/p stent on 5/2022), Emphysema, HCV, chronic pancreatitis, presenting from [facility] for esophageal stent removal due to intractable n/v. Pt was admitted to [facility] on 11/25 for AMS 2/2 Septic shock due to pseudomonal PNA, Lung abscess, morganella bacteremia and klebsiella UTI. Pt was initially intubated for acute respiratory failure and required pressors for hypotension. Pt's hospital course was complicated by YUNIEL due to septic ATN requiring 3 rounds of HD.  Patient s/p EGD with stent removal on 12/16/22.  GI now reconsulted for acute on chronic anemia.    1.  Acute on chronic anemia.  Likely multifactorial related to CKD, chronic illness, chronic blood draws.  No overt bleeding but patient may have occult bleeding from esophagitis, erosion, ulcer, colonic lesions.  2.  Chronic pancreatitis.    recs:  - Monitor Hb daily, transfuse to Hb>7.  - PPI BID.  - Clear liquid diet, will prep and plan for EGD/colonoscopy tomorrow

## 2023-01-12 NOTE — PROGRESS NOTE ADULT - ASSESSMENT
62M EtoH misuse c/b chronic pancreatitis, HCV s/p treatment (SVR), emphysema, benign esophageal stricture (s/p stent on 4/2022) admitted at [facility] w/ septic shock septic shock due to pseudomonal PNA, lung abscess, Morganella bacteremia and Klebsiella UTI (now s/p 6 wks zosyn ended 1/6) w/ hospital course c/b YUNIEL on CKD requiring HD and MICU stay, transferred here for intractable nausea/vomiting and esophageal stent removal, now s/p removal w/ EGD sig for esophagitis/gastric ulcer. Hospital course now c/b acute on chronic anemia

## 2023-01-12 NOTE — PROGRESS NOTE ADULT - SUBJECTIVE AND OBJECTIVE BOX
Gastroenterology/Hepatology Progress Note      Interval Events:     Patient was very frustrated this morning that he had to wait for the procedures tomorrow. He thought he was getting the procedures today and is tired of being on the CLD, would like to eat solid food. as per nurse, had brown BM overnight.     Allergies:  Tylenol (Other)      Hospital Medications:  albuterol/ipratropium for Nebulization. 3 milliLiter(s) Nebulizer once  calcitriol   Capsule 0.25 MICROGram(s) Oral daily  citric acid/sodium citrate Solution 15 milliLiter(s) Oral two times a day  epoetin mejia-epbx (RETACRIT) Injectable 01659 Unit(s) SubCutaneous <User Schedule>  folic acid 1 milliGRAM(s) Oral daily  heparin   Injectable 5000 Unit(s) SubCutaneous every 8 hours  multivitamin 1 Tablet(s) Oral daily  NIFEdipine XL 30 milliGRAM(s) Oral daily  ondansetron Injectable 4 milliGRAM(s) IV Push every 6 hours PRN  oxyCODONE    IR 7.5 milliGRAM(s) Oral every 4 hours PRN  pancrelipase  (CREON  6,000 Lipase Units) 1 Capsule(s) Oral three times a day with meals  pantoprazole  Injectable 40 milliGRAM(s) IV Push every 12 hours  sevelamer carbonate 800 milliGRAM(s) Oral three times a day  sodium bicarbonate 650 milliGRAM(s) Oral three times a day  sucralfate 1 Gram(s) Oral every 6 hours  thiamine 100 milliGRAM(s) Oral daily      ROS: 14 point ROS negative unless otherwise state in subjective    PHYSICAL EXAM:   Vital Signs:  Vital Signs Last 24 Hrs  T(C): 36.7 (12 Jan 2023 06:36), Max: 37.1 (11 Jan 2023 18:00)  T(F): 98 (12 Jan 2023 06:36), Max: 98.7 (11 Jan 2023 18:00)  HR: 89 (12 Jan 2023 06:36) (82 - 97)  BP: 128/73 (12 Jan 2023 06:36) (123/62 - 135/69)  BP(mean): --  RR: 18 (12 Jan 2023 06:36) (16 - 18)  SpO2: 100% (12 Jan 2023 06:36) (100% - 100%)    Parameters below as of 12 Jan 2023 06:36  Patient On (Oxygen Delivery Method): room air      Daily     Daily     GENERAL:  No acute distress, chronically ill, lying in bed.   HEENT:  NCAT, no scleral icterus  CHEST: no resp distress  ABDOMEN:  Soft, non-tender, non-distended, no masses  EXTREMITIES:  No LE edema  SKIN:  No rash/erythema/ecchymoses/petechiae/wounds/abscess/warm/dry  NEURO:  Alert and oriented x 3, no tremor  PSYCH: agitated and not cooperative.     LABS:                        7.1    8.09  )-----------( 240      ( 11 Jan 2023 18:35 )             21.9     Mean Cell Volume: 93.2 fL (01-11-23 @ 18:35)    01-12    140  |  110<H>  |  74<H>  ----------------------------<  88  3.7   |  15<L>  |  4.59<H>    Ca    7.6<L>      12 Jan 2023 05:50  Phos  5.5     01-12  Mg     1.90     01-12                  Imaging:    Upper endoscopy on 12/16/22    Findings:       An esophageal stent was found in the lower third of the esophagus at 36        cm from the incisors. No evidence of esophageal obstruction. Stent        traversed the GE junction into the gastric cardia. Stent removal was        accomplished with a rat-toothed forceps.       LA Grade D (one or more mucosal breaks involving at least 75% of        esophageal circumference) esophagitis with no bleeding was found 26 cm        from the incisors.       One large non-bleeding cratered gastric ulcer with no stigmata of        bleeding was found in the cardia after esophageal stent removal, likely        2/2 ulceration from the stent. No high risk stigmata seen.       Patchy mildly erythematous mucosa without bleedingwas found at the        pylorus. Biopsies were taken with a cold forceps for histology.       Patchy mildly congested and erythematous mucosa without active bleeding        and with no stigmata of bleeding was found in the duodenal bulb, in the    first portion of the duodenum and in the second portion of the duodenum.        Biopsies were taken with a cold forceps for histology from the first        portion of the duodenum.               Impression:          - Pre-existing esophageal stent, removed.                       - LA Grade D esophagitis.                       - Erythematous mucosa in the pylorus. Biopsied.                       - Congested duodenal mucosa. Biopsied.           Gastroenterology/Hepatology Progress Note    Interval Events:   Patient was very frustrated this morning that he had to wait for the procedures tomorrow. He thought he was getting the procedures today and is tired of being on the CLD, would like to eat solid food. as per nurse, had brown BM overnight.       Allergies:  Tylenol (Other)      Hospital Medications:  albuterol/ipratropium for Nebulization. 3 milliLiter(s) Nebulizer once  calcitriol   Capsule 0.25 MICROGram(s) Oral daily  citric acid/sodium citrate Solution 15 milliLiter(s) Oral two times a day  epoetin mejia-epbx (RETACRIT) Injectable 97317 Unit(s) SubCutaneous <User Schedule>  folic acid 1 milliGRAM(s) Oral daily  heparin   Injectable 5000 Unit(s) SubCutaneous every 8 hours  multivitamin 1 Tablet(s) Oral daily  NIFEdipine XL 30 milliGRAM(s) Oral daily  ondansetron Injectable 4 milliGRAM(s) IV Push every 6 hours PRN  oxyCODONE    IR 7.5 milliGRAM(s) Oral every 4 hours PRN  pancrelipase  (CREON  6,000 Lipase Units) 1 Capsule(s) Oral three times a day with meals  pantoprazole  Injectable 40 milliGRAM(s) IV Push every 12 hours  sevelamer carbonate 800 milliGRAM(s) Oral three times a day  sodium bicarbonate 650 milliGRAM(s) Oral three times a day  sucralfate 1 Gram(s) Oral every 6 hours  thiamine 100 milliGRAM(s) Oral daily      ROS: 14 point ROS negative unless otherwise state in subjective    PHYSICAL EXAM:   Vital Signs:  Vital Signs Last 24 Hrs  T(C): 36.7 (12 Jan 2023 06:36), Max: 37.1 (11 Jan 2023 18:00)  T(F): 98 (12 Jan 2023 06:36), Max: 98.7 (11 Jan 2023 18:00)  HR: 89 (12 Jan 2023 06:36) (82 - 97)  BP: 128/73 (12 Jan 2023 06:36) (123/62 - 135/69)  BP(mean): --  RR: 18 (12 Jan 2023 06:36) (16 - 18)  SpO2: 100% (12 Jan 2023 06:36) (100% - 100%)    Parameters below as of 12 Jan 2023 06:36  Patient On (Oxygen Delivery Method): room air      Daily     Daily     GENERAL:  No acute distress, chronically ill, lying in bed.   HEENT:  NCAT, no scleral icterus  CHEST: no resp distress  ABDOMEN:  Soft, non-tender, non-distended, no masses  EXTREMITIES:  No LE edema  SKIN:  No rash/erythema/ecchymoses/petechiae/wounds/abscess/warm/dry  NEURO:  Alert and oriented x 3, no tremor  PSYCH: agitated and not cooperative.     LABS:                        7.1    8.09  )-----------( 240      ( 11 Jan 2023 18:35 )             21.9     Mean Cell Volume: 93.2 fL (01-11-23 @ 18:35)    01-12    140  |  110<H>  |  74<H>  ----------------------------<  88  3.7   |  15<L>  |  4.59<H>    Ca    7.6<L>      12 Jan 2023 05:50  Phos  5.5     01-12  Mg     1.90     01-12                  Imaging:    Upper endoscopy on 12/16/22    Findings:       An esophageal stent was found in the lower third of the esophagus at 36        cm from the incisors. No evidence of esophageal obstruction. Stent        traversed the GE junction into the gastric cardia. Stent removal was        accomplished with a rat-toothed forceps.       LA Grade D (one or more mucosal breaks involving at least 75% of        esophageal circumference) esophagitis with no bleeding was found 26 cm        from the incisors.       One large non-bleeding cratered gastric ulcer with no stigmata of        bleeding was found in the cardia after esophageal stent removal, likely        2/2 ulceration from the stent. No high risk stigmata seen.       Patchy mildly erythematous mucosa without bleeding was found at the        pylorus. Biopsies were taken with a cold forceps for histology.       Patchy mildly congested and erythematous mucosa without active bleeding        and with no stigmata of bleeding was found in the duodenal bulb, in the    first portion of the duodenum and in the second portion of the duodenum.        Biopsies were taken with a cold forceps for histology from the first        portion of the duodenum.               Impression:          - Pre-existing esophageal stent, removed.                       - LA Grade D esophagitis.                       - Erythematous mucosa in the pylorus. Biopsied.                       - Congested duodenal mucosa. Biopsied.

## 2023-01-12 NOTE — PROGRESS NOTE ADULT - PROBLEM SELECTOR PLAN 4
Pt. with acidosis in setting of renal failure. Serum CO2 low at 12 on 1/7/23. Continue with Bicitra 15ml PO BID and bicarb tabs 650 gm OP TID. SCO2 low stable at 15 today(1/12/23). Monitor SCO2    If you have any questions, please feel free to contact me  Jeff Pete  Nephrology Fellow  [phone]/ [employer] Teams(Preferred)  (After 5pm or on weekends please page the on-call fellow).

## 2023-01-12 NOTE — PROGRESS NOTE ADULT - PROBLEM SELECTOR PLAN 2
Secondary hyperparathyroidism. Ionized calcium was low. Continue with calcitriol 0.25mcg daily. Continue Sevelamer 800mg mg TID with meals. Monitor phos levels.

## 2023-01-12 NOTE — PROGRESS NOTE ADULT - PROBLEM SELECTOR PLAN 3
S/p HD X 3  in ICU in [facility], Cr plateaued at 5.7-6, off HD now; per renal plan for AVF creation on this admission as per d/w OP nephrologist baseline Cr is 3 and nearing ESRD  - avoid nephrotoxic, renally dose meds, trend Cr  - VA renal duplex with severe stenosis of the left renal artery; vascular consulted, no plan for intervention for HESHAM  - sevelamer 800 mg TID & bicarb 650 mg TID;  calcitriol 0.25 for secondary hyperparathyroidism per renal  - s/p AVF w/ vascular on 1/5 to the L arm, L arm precautions

## 2023-01-12 NOTE — PROGRESS NOTE ADULT - PROBLEM SELECTOR PLAN 1
Pt with YUNIEL on CKD likely multifactorial in the setting of recent COVID infection, poor oral intake. Scr was elevated at 2.20 in September 2022. Pt was recently admitted at [facility]. Scr on admission was significantly elevated at 8.94 on 11/25/22. Pt received 1st HD session on 11/26/22. Last HD session was done on 12/1/22. HD catheter was removed as kidney function was recovering. Scr was elevated at 5.28 on transfer from [facility] on 12/15/22. Scr remained elevated/stable at 5.40 on 1/5/23. Of note kidney sonogram had discrepant size renal artery duplex showing HESHAM but BP well controlled. Discussed with Dr. Dukes (12/21) who knows the patient very well and has seen him inpatient and outpatient over the last several years. As per Dr. Dukes the patient's baseline creatinine is 3 and has advanced CKD and is nearing ESKD. Pt underwent cardiac cath on 1/4/23 , tolerated procedure well. Underwent LUE AVF creation on 1/5/23. Last Scr is elevated/stable at 4.59 today (1/12/23). Pt non uremic, non oliguric. No urgent indication for HD. Monitor labs and urine output. Avoid any potential nephrotoxins. Dose medications as per eGFR.

## 2023-01-12 NOTE — PROGRESS NOTE ADULT - PROBLEM SELECTOR PLAN 3
Pt. with anemia suspected in setting of renal failure. Received IV iron. Currently on Epo 10K three times a week. Monitor Hb

## 2023-01-12 NOTE — PROGRESS NOTE ADULT - SUBJECTIVE AND OBJECTIVE BOX
[facility] DIVISION OF KIDNEY DISEASES AND HYPERTENSION -- FOLLOW UP NOTE  --------------------------------------------------------------------------------  HPI: Pt is a 62-year-old Male with Hx of esophageal stricture (S/p stenting in 5/22), Emphysema, HCV, chronic pancreatitis who initially presented to [facility] on 11/25/22 for AMS. Pt was admitted to [facility] on 11/25 for AMS 2/2 Septic shock. Pt was initially intubated for acute respiratory failure and required pressors for hypotension. Pt's hospital course was complicated by YUNIEL/ATN requiring HD. Pt was extubated and was transferred to [facility] for esophageal stent removal by Dr. Erickson. Initial labs on this admission concerning for elevated Scr. Nephrology team following for YUNIEL on CKD.     On review of [facility], it was noted that Scr was elevated at 2.20 in September 2022. Scr on admission was significantly elevated at 8.94 on 11/25/22. Pt received 1st HD session on 11/26/22. Last HD session was done on 12/1/22. HD catheter was removed as kidney function was recovering. Scr was elevated at 5.28 on transfer from [facility] on 12/15/22. Scr remains elevated/stable at 5-6 range. Scr was elevated/stable at 5.19 on 1/7/23. Scr remain at 4.59 today (1/12/23).     Pt. seen and examined this morning. Pt awake and complained everywhere, wishes to get oxycodone. Denies any chest pain, shortness of breath, or dizziness.    PAST HISTORY  --------------------------------------------------------------------------------  No significant changes to PMH, PSH, FHx, SHx, unless otherwise noted    ALLERGIES & MEDICATIONS  --------------------------------------------------------------------------------  Allergies    Tylenol (Other)    Intolerances    Standing Inpatient Medications  albuterol/ipratropium for Nebulization. 3 milliLiter(s) Nebulizer once  calcitriol   Capsule 0.25 MICROGram(s) Oral daily  citric acid/sodium citrate Solution 15 milliLiter(s) Oral two times a day  epoetin mejia-epbx (RETACRIT) Injectable 04283 Unit(s) SubCutaneous <User Schedule>  folic acid 1 milliGRAM(s) Oral daily  heparin   Injectable 5000 Unit(s) SubCutaneous every 8 hours  multivitamin 1 Tablet(s) Oral daily  NIFEdipine XL 30 milliGRAM(s) Oral daily  pancrelipase  (CREON  6,000 Lipase Units) 1 Capsule(s) Oral three times a day with meals  pantoprazole  Injectable 40 milliGRAM(s) IV Push every 12 hours  sevelamer carbonate 800 milliGRAM(s) Oral three times a day  sodium bicarbonate 650 milliGRAM(s) Oral three times a day  sucralfate 1 Gram(s) Oral every 6 hours  thiamine 100 milliGRAM(s) Oral daily    PRN Inpatient Medications  ondansetron Injectable 4 milliGRAM(s) IV Push every 6 hours PRN  oxyCODONE    IR 7.5 milliGRAM(s) Oral every 4 hours PRN    REVIEW OF SYSTEMS  --------------------------------------------------------------------------------  Gen: No fevers , see HPI  Respiratory: No dyspnea  CV: No chest pain  GI: No abdominal pain  MSK: No  edema  Neuro: no dizziness   All other systems were reviewed and are negative, except as noted.    VITALS/PHYSICAL EXAM  --------------------------------------------------------------------------------  T(C): 36.7 (01-12-23 @ 10:02), Max: 37.1 (01-11-23 @ 18:00)  HR: 76 (01-12-23 @ 10:02) (76 - 97)  BP: 130/75 (01-12-23 @ 10:02) (123/62 - 130/75)  RR: 18 (01-12-23 @ 10:02) (16 - 18)  SpO2: 100% (01-12-23 @ 10:02) (100% - 100%)  Wt(kg): --    Weight (kg): 55.9 (01-11-23 @ 02:37)    01-11-23 @ 07:01  -  01-12-23 @ 07:00  --------------------------------------------------------  IN: 720 mL / OUT: 1500 mL / NET: -780 mL    01-12-23 @ 07:01  -  01-12-23 @ 11:21  --------------------------------------------------------  IN: 0 mL / OUT: 300 mL / NET: -300 mL    Physical Exam:  	Gen NAD  	HEENT: no JVD  	Pulm: CTABL  	CV: S1S2,  	Abd: Soft,   	Ext:   - edema B/L LE   	Neuro: Awake and alert  	Skin: Warm and dry              Vascular: LUE AVF + bruit    LABS/STUDIES  --------------------------------------------------------------------------------              7.1    8.09  >-----------<  240      [01-11-23 @ 18:35]              21.9     140  |  110  |  74  ----------------------------<  88      [01-12-23 @ 05:50]  3.7   |  15  |  4.59        Ca     7.6     [01-12-23 @ 05:50]      Mg     1.90     [01-12-23 @ 05:50]      Phos  5.5     [01-12-23 @ 05:50]    Creatinine Trend:  SCr 4.59 [01-12 @ 05:50]  SCr 4.63 [01-11 @ 06:55]  SCr 4.78 [01-11 @ 01:00]  SCr 4.68 [01-10 @ 14:34]  SCr 5.19 [01-07 @ 06:54]    Urinalysis - [12-18-22 @ 00:40]      Color Light Yellow / Appearance Clear / SG 1.013 / pH 6.0      Gluc Negative / Ketone Negative  / Bili Negative / Urobili <2 mg/dL       Blood Trace / Protein 30 mg/dL / Leuk Est Negative / Nitrite Negative      RBC 2 / WBC 1 / Hyaline  / Gran  / Sq Epi  / Non Sq Epi 1 / Bacteria Negative    Iron 104, TIBC TNP, %sat --      [01-03-23 @ 06:12]  Ferritin 311      [01-03-23 @ 06:12]  PTH -- (Ca --)      [12-22-22 @ 06:40]   290  Vitamin D (25OH) 10.4      [01-04-23 @ 06:45]  HbA1c 5.0      [03-22-19 @ 09:03]  TSH 1.380      [09-01-22 @ 08:58]  Lipid: chol 147, TG 88, HDL 59, LDL --      [09-01-22 @ 08:58]

## 2023-01-12 NOTE — PROGRESS NOTE ADULT - SUBJECTIVE AND OBJECTIVE BOX
Priya Arce    62y old  Male who presents with a chief complaint of intractable N/V , esophageal stent removal (12 Jan 2023 11:21)      SUBJECTIVE / OVERNIGHT EVENTS: No acute overnight events. This morning pt w/o new complaints. Denies fevers, chills, nausea, vomiting, chest pain, SOB. Denies blood in stool       MEDICATIONS  (STANDING):  albuterol/ipratropium for Nebulization. 3 milliLiter(s) Nebulizer once  calcitriol   Capsule 0.25 MICROGram(s) Oral daily  citric acid/sodium citrate Solution 15 milliLiter(s) Oral two times a day  epoetin mejia-epbx (RETACRIT) Injectable 20175 Unit(s) SubCutaneous <User Schedule>  folic acid 1 milliGRAM(s) Oral daily  heparin   Injectable 5000 Unit(s) SubCutaneous every 8 hours  multivitamin 1 Tablet(s) Oral daily  NIFEdipine XL 30 milliGRAM(s) Oral daily  pancrelipase  (CREON  6,000 Lipase Units) 1 Capsule(s) Oral three times a day with meals  pantoprazole  Injectable 40 milliGRAM(s) IV Push every 12 hours  polyethylene glycol/electrolyte Solution. 4000 milliLiter(s) Oral once  sevelamer carbonate 800 milliGRAM(s) Oral three times a day  sodium bicarbonate 650 milliGRAM(s) Oral three times a day  sucralfate 1 Gram(s) Oral every 6 hours  thiamine 100 milliGRAM(s) Oral daily    MEDICATIONS  (PRN):  benzocaine 15 mG/menthol 3.6 mG Lozenge 1 Lozenge Oral every 6 hours PRN Sore Throat  ondansetron Injectable 4 milliGRAM(s) IV Push every 6 hours PRN Nausea and/or Vomiting  oxyCODONE    IR 7.5 milliGRAM(s) Oral every 4 hours PRN mod-severe pain    Allergies    Tylenol (Other)    Intolerances        Vital Signs Last 24 Hrs  T(C): 36.4 (12 Jan 2023 16:57), Max: 37.1 (11 Jan 2023 18:00)  T(F): 97.6 (12 Jan 2023 16:57), Max: 98.7 (11 Jan 2023 18:00)  HR: 84 (12 Jan 2023 16:57) (76 - 97)  BP: 131/74 (12 Jan 2023 16:57) (123/62 - 131/74)  BP(mean): --  RR: 19 (12 Jan 2023 16:57) (16 - 19)  SpO2: 100% (12 Jan 2023 16:57) (100% - 100%)    Parameters below as of 12 Jan 2023 16:57  Patient On (Oxygen Delivery Method): room air      Daily     Daily   CAPILLARY BLOOD GLUCOSE        I&O's Summary    11 Jan 2023 07:01  -  12 Jan 2023 07:00  --------------------------------------------------------  IN: 720 mL / OUT: 1500 mL / NET: -780 mL    12 Jan 2023 07:01  -  12 Jan 2023 17:48  --------------------------------------------------------  IN: 0 mL / OUT: 650 mL / NET: -650 mL        PHYSICAL EXAM:    GENERAL: NAD, thin  CHEST/LUNG: Clear to auscultation bilaterally; No wheeze  HEART: Regular rate and rhythm; No murmurs, rubs, or gallops  ABDOMEN: Soft, Nontender, Nondistended; Bowel sounds present  EXTREMITIES:   warm and well perfused, No clubbing, cyanosis, or edema; R elbow swelling in dependent area   PSYCH: AAOx3, odd affect, a bit guarded  NEUROLOGY: non-focal  L AVF dressing c/d/i      LABS:                        7.1    8.09  )-----------( 240      ( 11 Jan 2023 18:35 )             21.9     Hgb Trend: 7.1<--, 7.3<--, 7.2<--, 6.7<--  01-12    140  |  110<H>  |  74<H>  ----------------------------<  88  3.7   |  15<L>  |  4.59<H>    Ca    7.6<L>      12 Jan 2023 05:50  Phos  5.5     01-12  Mg     1.90     01-12      Creatinine Trend: 4.59<--, 4.63<--, 4.78<--, 4.68<--, 5.19<--, 5.40<--              RADIOLOGY & ADDITIONAL TESTS:    Imaging Personally Reviewed.    Consultant(s) Notes Reviewed.    Care Discussed with Consultants/Other Providers.

## 2023-01-13 NOTE — PROGRESS NOTE ADULT - ATTENDING COMMENTS
going for EGD and colonoscopy  bicarbonate trending down and vomiting this am   start IV Bicarbonate gtt at 75cc/hr   Further detailed plan of management and recommendation as outlined above by the renal fellow.

## 2023-01-13 NOTE — PROGRESS NOTE ADULT - SUBJECTIVE AND OBJECTIVE BOX
Priya Arce     62y old  Male who presents with a chief complaint of intractable N/V , esophageal stent removal (13 Jan 2023 08:41)      SUBJECTIVE / OVERNIGHT EVENTS: No acute overnight events. This morning pt continues to endorse abdominal pain. Also w/ some N/V. Expresses frustration re: not eating    MEDICATIONS  (STANDING):  albuterol/ipratropium for Nebulization. 3 milliLiter(s) Nebulizer once  calcitriol   Capsule 0.25 MICROGram(s) Oral daily  citric acid/sodium citrate Solution 15 milliLiter(s) Oral two times a day  dextrose 5% 1000 milliLiter(s) (75 mL/Hr) IV Continuous <Continuous>  epoetin mejia-epbx (RETACRIT) Injectable 88049 Unit(s) SubCutaneous <User Schedule>  folic acid 1 milliGRAM(s) Oral daily  heparin   Injectable 5000 Unit(s) SubCutaneous every 8 hours  multivitamin 1 Tablet(s) Oral daily  NIFEdipine XL 30 milliGRAM(s) Oral daily  pancrelipase  (CREON  6,000 Lipase Units) 1 Capsule(s) Oral three times a day with meals  pantoprazole  Injectable 40 milliGRAM(s) IV Push every 12 hours  sevelamer carbonate 800 milliGRAM(s) Oral three times a day  sodium bicarbonate 1300 milliGRAM(s) Oral three times a day  sucralfate 1 Gram(s) Oral every 6 hours  thiamine 100 milliGRAM(s) Oral daily    MEDICATIONS  (PRN):  ondansetron Injectable 4 milliGRAM(s) IV Push every 6 hours PRN Nausea and/or Vomiting  oxyCODONE    IR 7.5 milliGRAM(s) Oral every 4 hours PRN mod-severe pain    Allergies    Tylenol (Other)    Intolerances        Vital Signs Last 24 Hrs  T(C): 36.7 (13 Jan 2023 17:58), Max: 36.9 (12 Jan 2023 22:12)  T(F): 98.1 (13 Jan 2023 17:58), Max: 98.5 (12 Jan 2023 22:12)  HR: 63 (13 Jan 2023 18:03) (63 - 88)  BP: 119/51 (13 Jan 2023 18:03) (104/57 - 131/71)  BP(mean): --  RR: 12 (13 Jan 2023 18:03) (12 - 19)  SpO2: 100% (13 Jan 2023 18:03) (98% - 100%)    Parameters below as of 13 Jan 2023 18:03  Patient On (Oxygen Delivery Method): mask, simple face  O2 Flow (L/min): 4    Daily     Daily   CAPILLARY BLOOD GLUCOSE        I&O's Summary    12 Jan 2023 07:01  -  13 Jan 2023 07:00  --------------------------------------------------------  IN: 0 mL / OUT: 650 mL / NET: -650 mL        PHYSICAL EXAM:  GENERAL: NAD, thin  CHEST/LUNG: Clear to auscultation bilaterally; No wheeze  HEART: Regular rate and rhythm; No murmurs, rubs, or gallops  ABDOMEN: Soft, Nontender, Nondistended; Bowel sounds present  EXTREMITIES:   warm and well perfused, No clubbing, cyanosis, or edema; R elbow swelling in dependent area   PSYCH: AAOx3, odd affect, a bit guarded  NEUROLOGY: non-focal  L AVF dressing c/d/i    LABS:                        6.9    7.94  )-----------( 210      ( 13 Jan 2023 11:50 )             23.1     Hgb Trend: 6.9<--, 7.1<--, 7.3<--, 7.2<--, 6.7<--  01-13    139  |  109<H>  |  72<H>  ----------------------------<  120<H>  4.0   |  13<L>  |  4.86<H>    Ca    7.8<L>      13 Jan 2023 05:34  Phos  4.7     01-13  Mg     2.10     01-13    TPro  5.6<L>  /  Alb  2.5<L>  /  TBili  <0.2  /  DBili  x   /  AST  20  /  ALT  21  /  AlkPhos  116  01-13    Creatinine Trend: 4.86<--, 4.59<--, 4.63<--, 4.78<--, 4.68<--, 5.19<--  LIVER FUNCTIONS - ( 13 Jan 2023 05:34 )  Alb: 2.5 g/dL / Pro: 5.6 g/dL / ALK PHOS: 116 U/L / ALT: 21 U/L / AST: 20 U/L / GGT: x                     RADIOLOGY & ADDITIONAL TESTS:    Imaging Personally Reviewed.    Consultant(s) Notes Reviewed.    Care Discussed with Consultants/Other Providers.

## 2023-01-13 NOTE — PROGRESS NOTE ADULT - SUBJECTIVE AND OBJECTIVE BOX
[facility] DIVISION OF KIDNEY DISEASES AND HYPERTENSION -- FOLLOW UP NOTE  --------------------------------------------------------------------------------  HPI: Pt is a 62-year-old Male with Hx of esophageal stricture (S/p stenting in 5/22), Emphysema, HCV, chronic pancreatitis who initially presented to [facility] on 11/25/22 for AMS. Pt was admitted to [facility] on 11/25 for AMS 2/2 Septic shock. Pt was initially intubated for acute respiratory failure and required pressors for hypotension. Pt's hospital course was complicated by YUNIEL/ATN requiring HD. Pt was extubated and was transferred to [facility] for esophageal stent removal by Dr. Erickson. Initial labs on this admission concerning for elevated Scr. Nephrology team following for YUNIEL on CKD.     On review of [facility], it was noted that Scr was elevated at 2.20 in September 2022. Scr on admission was significantly elevated at 8.94 on 11/25/22. Pt received 1st HD session on 11/26/22. Last HD session was done on 12/1/22. HD catheter was removed as kidney function was recovering. Scr was elevated at 5.28 on transfer from [facility] on 12/15/22. Scr remains elevated/stable at 5-6 range. Scr was elevated/stable at 5.19 on 1/7/23. Scr remain at 4.86 today (1/13/23).     Pt. seen and examined this morning. Pt awake and complaining of generalized pain. Denies any chest pain, shortness of breath, or dizziness.        PAST HISTORY  --------------------------------------------------------------------------------  No significant changes to PMH, PSH, FHx, SHx, unless otherwise noted    ALLERGIES & MEDICATIONS  --------------------------------------------------------------------------------  Allergies    Tylenol (Other)    Intolerances      Standing Inpatient Medications  albuterol/ipratropium for Nebulization. 3 milliLiter(s) Nebulizer once  calcitriol   Capsule 0.25 MICROGram(s) Oral daily  citric acid/sodium citrate Solution 15 milliLiter(s) Oral two times a day  epoetin mejia-epbx (RETACRIT) Injectable 02180 Unit(s) SubCutaneous <User Schedule>  folic acid 1 milliGRAM(s) Oral daily  heparin   Injectable 5000 Unit(s) SubCutaneous every 8 hours  multivitamin 1 Tablet(s) Oral daily  NIFEdipine XL 30 milliGRAM(s) Oral daily  pancrelipase  (CREON  6,000 Lipase Units) 1 Capsule(s) Oral three times a day with meals  pantoprazole  Injectable 40 milliGRAM(s) IV Push every 12 hours  sevelamer carbonate 800 milliGRAM(s) Oral three times a day  sodium bicarbonate 650 milliGRAM(s) Oral three times a day  sucralfate 1 Gram(s) Oral every 6 hours  thiamine 100 milliGRAM(s) Oral daily    PRN Inpatient Medications  benzocaine 15 mG/menthol 3.6 mG Lozenge 1 Lozenge Oral every 6 hours PRN  ondansetron Injectable 4 milliGRAM(s) IV Push every 6 hours PRN  oxyCODONE    IR 7.5 milliGRAM(s) Oral every 4 hours PRN      REVIEW OF SYSTEMS    All other systems were reviewed and are negative, except as noted.    VITALS/PHYSICAL EXAM  --------------------------------------------------------------------------------  T(C): 36.9 (01-12-23 @ 22:12), Max: 36.9 (01-12-23 @ 22:12)  HR: 88 (01-12-23 @ 22:12) (76 - 88)  BP: 123/64 (01-12-23 @ 22:12) (123/64 - 131/74)  RR: 18 (01-12-23 @ 22:12) (18 - 19)  SpO2: 100% (01-12-23 @ 22:12) (100% - 100%)  Wt(kg): --        01-12-23 @ 07:01  -  01-13-23 @ 07:00  --------------------------------------------------------  IN: 0 mL / OUT: 650 mL / NET: -650 mL        Physical Exam:  	Gen NAD  	HEENT: no JVD  	Pulm: CTABL  	CV: S1S2,  	Abd: Soft,   	Ext:   - edema B/L LE   	Neuro: Awake and alert  	Skin: Warm and dry              Vascular: LUE AVF + bruit      LABS/STUDIES  --------------------------------------------------------------------------------              7.1    8.09  >-----------<  240      [01-11-23 @ 18:35]              21.9     139  |  109  |  72  ----------------------------<  120      [01-13-23 @ 05:34]  4.0   |  13  |  4.86        Ca     7.8     [01-13-23 @ 05:34]      Mg     2.10     [01-13-23 @ 05:34]      Phos  4.7     [01-13-23 @ 05:34]    TPro  5.6  /  Alb  2.5  /  TBili  <0.2  /  DBili  x   /  AST  20  /  ALT  21  /  AlkPhos  116  [01-13-23 @ 05:34]          Creatinine Trend:  SCr 4.86 [01-13 @ 05:34]  SCr 4.59 [01-12 @ 05:50]  SCr 4.63 [01-11 @ 06:55]  SCr 4.78 [01-11 @ 01:00]  SCr 4.68 [01-10 @ 14:34]    Urinalysis - [12-18-22 @ 00:40]      Color Light Yellow / Appearance Clear / SG 1.013 / pH 6.0      Gluc Negative / Ketone Negative  / Bili Negative / Urobili <2 mg/dL       Blood Trace / Protein 30 mg/dL / Leuk Est Negative / Nitrite Negative      RBC 2 / WBC 1 / Hyaline  / Gran  / Sq Epi  / Non Sq Epi 1 / Bacteria Negative      Iron 104, TIBC TNP, %sat --      [01-03-23 @ 06:12]  Ferritin 311      [01-03-23 @ 06:12]  PTH -- (Ca --)      [12-22-22 @ 06:40]   290  Vitamin D (25OH) 10.4      [01-04-23 @ 06:45]  HbA1c 5.0      [03-22-19 @ 09:03]  TSH 1.380      [09-01-22 @ 08:58]  Lipid: chol 147, TG 88, HDL 59, LDL --      [09-01-22 @ 08:58]

## 2023-01-13 NOTE — PROGRESS NOTE ADULT - ASSESSMENT
62M EtoH misuse c/b chronic pancreatitis, HCV s/p treatment (SVR), emphysema, benign esophageal stricture (s/p stent on 4/2022) admitted at [facility] w/ septic shock septic shock due to pseudomonal PNA, lung abscess, Morganella bacteremia and Klebsiella UTI (now s/p 6 wks zosyn ended 1/6) w/ hospital course c/b YUNIEL on CKD requiring HD and MICU stay, transferred here for intractable nausea/vomiting and esophageal stent removal, now s/p removal w/ EGD sig for esophagitis/gastric ulcer. Hospital course now c/b acute on chronic anemia

## 2023-01-13 NOTE — PROGRESS NOTE ADULT - PROBLEM SELECTOR PLAN 1
Diff inclu  GIB iso PUD and medication non-adherence (intermittently refusing PPI/ Carafate) +/- anemia of chronic disease. r/o hemolysis  -Previous EGD 12/16/22 w/ esophagitis  -s/p 3 units PRBC on this admission  -c/w IV PPI BID  -C/w Carafate  -F/u repeat EGD and colonoscopy  -Obtain haptoglobin, LDH, Retic count

## 2023-01-13 NOTE — CHART NOTE - NSCHARTNOTEFT_GEN_A_CORE
Reason for Follow-Up Assessment: Severe Malnutrition    62M EtoH misuse c/b chronic pancreatitis, HCV s/p treatment (SVR), emphysema, benign esophageal stricture (s/p stent on 4/2022) admitted at [facility] w/ septic shock septic shock due to pseudomonal PNA, lung abscess, Morganella bacteremia and Klebsiella UTI (now s/p 6 wks zosyn ended 1/6) w/ hospital course c/b YUNIEL on CKD requiring HD and MICU stay, transferred here for intractable nausea/vomiting and esophageal stent removal, now s/p removal w/ EGD sig for esophagitis/gastric ulcer. Hospital course now c/b acute on chronic anemia.    GI reconsulted and pending EGD / Colonoscopy 1/13.    Source: [ ] Patient [ ] Family [ ] RN [ X ] Chart      Diet, NPO after Midnight:      NPO Start Date: 12-Jan-2023,   NPO Start Time: 23:59 (01-12-23 @ 15:19)  Diet, Clear Liquid:   For patients receiving Renal Replacement - No Protein Restr, No Conc K, No Conc Phos, Low Sodium (RENAL) (01-11-23 @ 09:36)      GI: Fecal Incontinence, + vomiting 1/9 - subsequently placed on clears 1/9, back on regular diet 1/10, then back to clears 1/11, now NPO for procedure    PO intake:  [ ] Poor < 50%  [ ] Fair 50-75% [ ] Good  % [ ] Inconsistent PO intake    Enteral /Parenteral Nutrition:       [  ] n/a    Anthropometrics: Height (cm): 182.9 (01-05), 177.8 (11-25)  Weight (kg): 55.9 (01-11), 42.3 (11-25)  BMI (kg/m2): 16.7 (01-11), 12.6 (01-05), 13.4 (11-25)    Edema:    Pressure Injuries:    _______________ Pertinent Medications_______________  MEDICATIONS  (STANDING):  albuterol/ipratropium for Nebulization. 3 milliLiter(s) Nebulizer once  calcitriol   Capsule 0.25 MICROGram(s) Oral daily  citric acid/sodium citrate Solution 15 milliLiter(s) Oral two times a day  epoetin mejia-epbx (RETACRIT) Injectable 52996 Unit(s) SubCutaneous <User Schedule>  folic acid 1 milliGRAM(s) Oral daily  heparin   Injectable 5000 Unit(s) SubCutaneous every 8 hours  multivitamin 1 Tablet(s) Oral daily  NIFEdipine XL 30 milliGRAM(s) Oral daily  pancrelipase  (CREON  6,000 Lipase Units) 1 Capsule(s) Oral three times a day with meals  pantoprazole  Injectable 40 milliGRAM(s) IV Push every 12 hours  sevelamer carbonate 800 milliGRAM(s) Oral three times a day  sodium bicarbonate 650 milliGRAM(s) Oral three times a day  sucralfate 1 Gram(s) Oral every 6 hours  thiamine 100 milliGRAM(s) Oral daily    MEDICATIONS  (PRN):  benzocaine 15 mG/menthol 3.6 mG Lozenge 1 Lozenge Oral every 6 hours PRN Sore Throat  ondansetron Injectable 4 milliGRAM(s) IV Push every 6 hours PRN Nausea and/or Vomiting  oxyCODONE    IR 7.5 milliGRAM(s) Oral every 4 hours PRN mod-severe pain      __________________ Pertinent Labs__________________   01-13 Na139 mmol/L Glu 120 mg/dL<H> K+ 4.0 mmol/L Cr  4.86 mg/dL<H> BUN 72 mg/dL<H> 01-13 Phos 4.7 mg/dL<H> 01-13 Alb 2.5 g/dL<L>            Estimated Needs:   elizabeth/d  gm pro/d    [ ] no change since previous assessment  [ ] recalculated:     Previous Nutrition Diagnosis:     Nutrition Diagnosis is [ ] ongoing  [ ] resolved [ ] not applicable     New Nutrition Diagnosis:    Monitoring and Evaluation:     [ x ] Tolerance to diet prescription / adequacy of meal intake  [ x ] Weight trends   [ x ] Pertinent labs  [ x ] Other:    Recommendations:  1) Diet / Supplement Changes:  2) Obtain and record current weights to best monitor for acute changes in nutritional status.  3) Please consistently document % meal intake in nursing flowsheets. Reason for Follow-Up Assessment: Severe Malnutrition    HPI 62 year old male with Hx of esophageal stricture (s/p stent on 5/2022), Emphysema, HCV, chronic pancreatitis, presenting from [facility] for esophageal stent removal due to intractable n/v. Pt was admitted to [facility] on 11/25 for AMS 2/2 Septic shock due to pseudomonal PNA, Lung abscess, morganella bacteremia and klebsiella UTI. Pt was initially intubated for acute respiratory failure and required pressors for hypotension. Pt's hospital course was complicated by YUNIEL due to septic ATN requiring 3 rounds of HD.  Patient s/p EGD with stent removal on 12/16/22.  GI was now reconsulted and Pt. pending EGD / Colonoscopy 1/13. Nephrology also following patient for YUNIEL on CKD, underwent lt AVF creation 1/5/23, per nephrology, no urgent indication for HD at this time.    Diet, NPO after Midnight:   NPO Start Date: 12-Jan-2023,   NPO Start Time: 23:59 (01-12-23 @ 15:19)    Diet, Clear Liquid:   For patients receiving Renal Replacement - No Protein Restr, No Conc K, No Conc Phos, Low Sodium (RENAL) (01-11-23 @ 09:36)    GI: Fecal Incontinence, + vomiting 1/9 - subsequently placed on clears 1/9, back on regular diet 1/10, then back to clears 1/11, now NPO for procedure    PO intake:  [X] Poor < 50%, currently NPO for procedure  [ ] Fair 50-75% [ ] Good  % [ ] Inconsistent PO intake    Enteral /Parenteral Nutrition:       [ X ] n/a    Anthropometrics: Height (cm): 182.9 (01-05), 177.8 (11-25)  Weight (kg): 55.9 (01-11), 59.1 (01-05),  59.1 (12/16) 42.3 (11-25)  BMI (kg/m2): 16.7 (01-11), 12.6 (01-05), 13.4 (11-25)    Edema: 2+ edema noted to lt arm (nursing flowsheets)    Pressure Injuries: Stage II Rt Buttock per flowsheets    _______________ Pertinent Medications_______________  MEDICATIONS  (STANDING):  albuterol/ipratropium for Nebulization. 3 milliLiter(s) Nebulizer once  calcitriol   Capsule 0.25 MICROGram(s) Oral daily  citric acid/sodium citrate Solution 15 milliLiter(s) Oral two times a day  epoetin mejia-epbx (RETACRIT) Injectable 04188 Unit(s) SubCutaneous <User Schedule>  folic acid 1 milliGRAM(s) Oral daily  heparin   Injectable 5000 Unit(s) SubCutaneous every 8 hours  multivitamin 1 Tablet(s) Oral daily  NIFEdipine XL 30 milliGRAM(s) Oral daily  pancrelipase  (CREON  6,000 Lipase Units) 1 Capsule(s) Oral three times a day with meals  pantoprazole  Injectable 40 milliGRAM(s) IV Push every 12 hours  sevelamer carbonate 800 milliGRAM(s) Oral three times a day  sodium bicarbonate 650 milliGRAM(s) Oral three times a day  sucralfate 1 Gram(s) Oral every 6 hours  thiamine 100 milliGRAM(s) Oral daily    MEDICATIONS  (PRN):  benzocaine 15 mG/menthol 3.6 mG Lozenge 1 Lozenge Oral every 6 hours PRN Sore Throat  ondansetron Injectable 4 milliGRAM(s) IV Push every 6 hours PRN Nausea and/or Vomiting  oxyCODONE    IR 7.5 milliGRAM(s) Oral every 4 hours PRN mod-severe pain    __________________ Pertinent Labs__________________   01-13 Na139 mmol/L Glu 120 mg/dL<H> K+ 4.0 mmol/L Cr  4.86 mg/dL<H> BUN 72 mg/dL<H> 01-13 Phos 4.7 mg/dL<H> 01-13 Alb 2.5 g/dL<L>    Estimated Needs:   [X] no change since previous assessment  [ ] recalculated:     Previous Nutrition Diagnosis:  SEVERE MALNUTRITION ongoing  Patient also with Nutrition Dx #2 Altered Nutrition Related Lab Values in setting of advanced CKD as evidenced by Phos 7.7; elevated BUN/Cr.    Monitoring and Evaluation:     [ x ] Tolerance to diet prescription / adequacy of meal intake  [ x ] Weight trends   [ x ] Pertinent labs    Recommendations:  1) Diet / Supplement Changes: Suggest resuming therapeutic diet when diet can be advanced - Renal with Suplena 2x daily (850 kcals, 21.2g protein) when medically appropriate   2) Obtain and record current weights to best monitor for acute changes in nutritional status.  3) Please consistently document % meal intake in nursing flowsheets.    Nisha Nelson, MS, RDN, CDN  Pager [phone]  Also available on MS Teams

## 2023-01-13 NOTE — CHART NOTE - NSCHARTNOTEFT_GEN_A_CORE
Spoke to the primary team regarding the results.     Recommendations:   - Please continue w/ sucralfate 1 gram [id number].   - Patient will need to continue w/ PO PPI 40mg BID.   - Recommend outpatient GI follow up w/ repeat upper endoscopy in 6-8 weeks and colonoscopy.   - Can resume clear liquid diet.   - monitor CBC daily.     Thank you for the consult. Please call us back if you have any questions or concerns.     All recommendations are tentative until the note is attested by an attending.     Tyson Urbano, PGY-4  Gastroenterology/Hepatology Fellow  Available on Microsoft Teams  [phone] (Short Range Pager)  [phone] (Long Range Pager)    After 5pm, please contact the on-call GI fellow. [phone]

## 2023-01-13 NOTE — PROGRESS NOTE ADULT - PROBLEM SELECTOR PLAN 3
S/p HD X 3  in ICU in [facility], Cr plateaued at 5.7-6, off HD now; per renal plan for AVF creation on this admission as per d/w OP nephrologist baseline Cr is 3 and nearing ESRD  - avoid nephrotoxic, renally dose meds, trend Cr  - VA renal duplex with severe stenosis of the left renal artery; vascular consulted, no plan for intervention for HESHAM  - sevelamer 800 mg TID & bicarb 650 mg TID;  calcitriol 0.25 for secondary hyperparathyroidism per renal  - s/p AVF w/ vascular on 1/5 to the L arm, L arm precautions  - Bicarb gtt per renal

## 2023-01-13 NOTE — PROGRESS NOTE ADULT - PROBLEM SELECTOR PLAN 4
Pt. with acidosis in setting of renal failure. Serum CO2 low at 13 today. Continue with Bicitra 15ml PO BID and increase bicarb tabs to 1300 gm OP TID. Monitor SCO2    If any questions, please feel free to contact me     Marco Antonio Choi  Nephrology Fellow  [facility] Pager: [phone]

## 2023-01-13 NOTE — PROGRESS NOTE ADULT - PROBLEM SELECTOR PLAN 12
SQH  Soft diet, advance as tolerated   pending CARMEN placement when stable SQH  CLD advance as tolerated   pending CARMEN placement when stable

## 2023-01-13 NOTE — PROGRESS NOTE ADULT - PROBLEM SELECTOR PLAN 2
Chronic pancreatitis; previously on Creon, will resume given likely exocrine insufficiency with malabsorption given BMI of 17.   - w/ persistent abdominal pain   - c/w Creon, refusing, educated on importance   - c/w oxycodone 7.5 mg q4h prn  - Pain mgt consulted

## 2023-01-13 NOTE — PROGRESS NOTE ADULT - PROBLEM SELECTOR PLAN 7
Septic shock due to pseudomonal PNA with lung abscess, morganella bacteremia and kelbsiella UTI.  Was on Zosyn since 11/25, [facility] 4 week course of antibiotics versus possibly longer pending imaging at 4 weeks (12/23)  - CXR 12/23 - Stable RUL lesion, with new RLL pneumonia   - seen by ID  - completed 6 wks zosyn 1/6  - per d/w ID no further imaging as 6 wks abx sufficient treatment

## 2023-01-14 NOTE — PROGRESS NOTE ADULT - PROBLEM SELECTOR PLAN 2
Chronic pancreatitis; previously on Creon, will resume given likely exocrine insufficiency with malabsorption given BMI of 17.   - w/ persistent abdominal pain   - c/w Creon, refusing, educated on importance   - c/w oxycodone 7.5 mg q4h prn  - Bowel regimen on opioids  - Resume reglan 5mg TID given mod food residue on EGD  - Pain mgt consulted

## 2023-01-14 NOTE — PROGRESS NOTE ADULT - ASSESSMENT
62M EtoH misuse c/b chronic pancreatitis, HCV s/p treatment (SVR), emphysema, benign esophageal stricture (s/p stent on 4/2022) admitted at [facility] w/ septic shock septic shock due to pseudomonal PNA, lung abscess, Morganella bacteremia and Klebsiella UTI (now s/p 6 wks zosyn ended 1/6) w/ hospital course c/b YUNIEL on CKD requiring HD and MICU stay, transferred here for intractable nausea/vomiting and esophageal stent removal, now s/p removal w/ EGD sig for esophagitis/gastric ulcer. Hospital course now c/b acute on chronic anemia

## 2023-01-14 NOTE — PROGRESS NOTE ADULT - PROBLEM SELECTOR PLAN 3
S/p HD X 3  in ICU in [facility], Cr plateaued at 5.7-6, off HD now; per renal plan for AVF creation on this admission as per d/w OP nephrologist baseline Cr is 3 and nearing ESRD  - VA renal duplex with severe stenosis of the left renal artery; vascular consulted, no plan for intervention for HESHAM  - sevelamer 800 mg TID & bicarb 650 mg TID;  calcitriol 0.25 for secondary hyperparathyroidism per renal  - s/p AVF w/ vascular on 1/5 to the L arm, L arm precautions  - avoid nephrotoxic  -renally dose meds  -trend Cr

## 2023-01-14 NOTE — PROGRESS NOTE ADULT - PROBLEM SELECTOR PLAN 1
Diff inclu  GIB iso PUD and medication non-adherence (intermittently refusing PPI/ Carafate) +/- anemia of chronic disease. Work up not consistent w/ hemolysis   -Previous EGD 12/16/22 w/ esophagitis  -Repeat EGD 1/13 w/o active bleeding  -Colonoscopy 1/13 w/o active bleeding. W/ mucosal ulceration. Biopsied  -F/u biopsy  -s/p 3 units PRBC on this admission  -c/w PPI BID- switch to PO  -C/w Carafate 1G Q6H  -Outpatient GI follow up w/ repeat upper endoscopy in 6-8 weeks and colonoscopy.

## 2023-01-14 NOTE — PROGRESS NOTE ADULT - SUBJECTIVE AND OBJECTIVE BOX
Priya Arce     62y old  Male who presents with a chief complaint of intractable N/V , esophageal stent removal (13 Jan 2023 18:06)      SUBJECTIVE / OVERNIGHT EVENTS: No acute overnight events. This morning pt complaining of diet. Would like to eat "regular food." Denies episodes of vomiting. States he only "spits a lot" Otherwise without blood in stool or blood in urine. No new complaints      MEDICATIONS  (STANDING):  albuterol/ipratropium for Nebulization. 3 milliLiter(s) Nebulizer once  calcitriol   Capsule 0.25 MICROGram(s) Oral daily  citric acid/sodium citrate Solution 15 milliLiter(s) Oral two times a day  dextrose 5% 1000 milliLiter(s) (75 mL/Hr) IV Continuous <Continuous>  epoetin mejia-epbx (RETACRIT) Injectable 14376 Unit(s) SubCutaneous <User Schedule>  folic acid 1 milliGRAM(s) Oral daily  heparin   Injectable 5000 Unit(s) SubCutaneous every 8 hours  metoclopramide 5 milliGRAM(s) Oral three times a day  multivitamin 1 Tablet(s) Oral daily  NIFEdipine XL 30 milliGRAM(s) Oral daily  pancrelipase  (CREON  6,000 Lipase Units) 1 Capsule(s) Oral three times a day with meals  pantoprazole    Tablet 40 milliGRAM(s) Oral every 12 hours  polyethylene glycol 3350 17 Gram(s) Oral two times a day  senna 2 Tablet(s) Oral at bedtime  sevelamer carbonate 800 milliGRAM(s) Oral three times a day  sodium bicarbonate 1300 milliGRAM(s) Oral three times a day  sucralfate 1 Gram(s) Oral every 6 hours  thiamine 100 milliGRAM(s) Oral daily    MEDICATIONS  (PRN):  bisacodyl 5 milliGRAM(s) Oral every 12 hours PRN Constipation  ondansetron Injectable 4 milliGRAM(s) IV Push every 6 hours PRN Nausea and/or Vomiting  oxyCODONE    IR 7.5 milliGRAM(s) Oral every 4 hours PRN Moderate Pain (4 - 6)-Severe pain (7-10)    Allergies    Tylenol (Other)    Intolerances        Vital Signs Last 24 Hrs  T(C): 36.9 (14 Jan 2023 10:31), Max: 36.9 (14 Jan 2023 06:27)  T(F): 98.4 (14 Jan 2023 10:31), Max: 98.4 (14 Jan 2023 06:27)  HR: 59 (14 Jan 2023 10:31) (59 - 83)  BP: 100/59 (14 Jan 2023 10:31) (100/59 - 134/73)  BP(mean): --  RR: 18 (14 Jan 2023 10:31) (12 - 19)  SpO2: 100% (14 Jan 2023 10:31) (99% - 100%)    Parameters below as of 14 Jan 2023 10:31  Patient On (Oxygen Delivery Method): room air      Daily     Daily   CAPILLARY BLOOD GLUCOSE        I&O's Summary    14 Jan 2023 07:01  -  14 Jan 2023 14:00  --------------------------------------------------------  IN: 0 mL / OUT: 300 mL / NET: -300 mL        PHYSICAL EXAM:  GENERAL: NAD, thin  CHEST/LUNG: Clear to auscultation bilaterally; No wheeze  HEART: Regular rate and rhythm; No murmurs, rubs, or gallops  ABDOMEN: Soft, w/ pain in upper abd but w/o TTP.  Bowel sounds present  EXTREMITIES:   warm and well perfused, No clubbing, cyanosis, or edema; R elbow swelling in dependent area   PSYCH: AAOx3, odd affect, intermittently guarded  NEUROLOGY: non-focal  L AVF dressing c/d/i      LABS:                        8.1    9.34  )-----------( 271      ( 14 Jan 2023 05:43 )             24.8     Hgb Trend: 8.1<--, 6.9<--, 7.1<--, 7.3<--, 7.2<--  01-14    135  |  104  |  74<H>  ----------------------------<  76  3.7   |  16<L>  |  4.69<H>    Ca    7.7<L>      14 Jan 2023 05:43  Phos  4.7     01-14  Mg     1.70     01-14    TPro  5.6<L>  /  Alb  2.4<L>  /  TBili  0.2  /  DBili  x   /  AST  18  /  ALT  20  /  AlkPhos  115  01-14    Creatinine Trend: 4.69<--, 4.86<--, 4.59<--, 4.63<--, 4.78<--, 4.68<--  LIVER FUNCTIONS - ( 14 Jan 2023 05:43 )  Alb: 2.4 g/dL / Pro: 5.6 g/dL / ALK PHOS: 115 U/L / ALT: 20 U/L / AST: 18 U/L / GGT: x                     RADIOLOGY & ADDITIONAL TESTS:    Imaging Personally Reviewed.    Consultant(s) Notes Reviewed.    Care Discussed with Consultants/Other Providers.

## 2023-01-14 NOTE — CONSULT NOTE ADULT - SUBJECTIVE AND OBJECTIVE BOX
Chief Complaint: Abdominal pain    HPI:  62 year old male with Hx of esophageal stricture (s/p stent on 5/2022 by Dr. Hernandez?), Emphysema, HCV, chronic pancreatitis, presenting from [facility] for esophageal stent removal due to intractable n/v. Pt was admitted to [facility] on 11/25 for AMS 2/2 Septic shock due to psuedomonal PNA, Lung abscess, morganella bacteremia and kelbsiella UTI. Pt was initially intubated for acute respiratory failure and required pressors for hypotension . Pt's hospital course was complicated by YUNIEL due to septic ATN requiring 3 rounds of HD. Pt is on now extubated, on 4L of NC, off pressors. Pt developed intractable N/V during his hospital stay and was transferred to [facility] for esophageal stent removal by Dr. Erickson.  Pt was also found to be COVID + on 12/13/22.   Pt alert, awake, AOx3 when seen. Pt states he feels cold and is aggravated. Pt demanding his pain medications for esophageal and body aches.   Denies chest pain, shortness of breath.      (15 Dec 2022 09:21)      PAST MEDICAL & SURGICAL HISTORY:  ETOH abuse  sober x1 year approximately      Pancreatitis      Hepatitis C  treated      Gout      HTN (hypertension)      Chronic kidney disease (CKD)      H/O chronic pancreatitis      Gout      Alcohol abuse      Gastric perforation          FAMILY HISTORY:  FH: HTN (hypertension)        SOCIAL HISTORY:  [ ] Denies Smoking, Alcohol, or Drug Use    Allergies    Tylenol (Other)    Intolerances        PAIN MEDICATIONS:  ondansetron Injectable 4 milliGRAM(s) IV Push every 6 hours PRN  oxyCODONE    IR 7.5 milliGRAM(s) Oral every 4 hours PRN      Heme:  heparin   Injectable 5000 Unit(s) SubCutaneous every 8 hours    Antibiotics:    Cardiovascular:  NIFEdipine XL 30 milliGRAM(s) Oral daily    GI:  bisacodyl 5 milliGRAM(s) Oral every 12 hours PRN  metoclopramide 5 milliGRAM(s) Oral three times a day  pancrelipase  (CREON  6,000 Lipase Units) 1 Capsule(s) Oral three times a day with meals  pantoprazole    Tablet 40 milliGRAM(s) Oral every 12 hours  polyethylene glycol 3350 17 Gram(s) Oral two times a day  senna 2 Tablet(s) Oral at bedtime  sucralfate 1 Gram(s) Oral every 6 hours    Endocrine:    All Other Medications:  calcitriol   Capsule 0.25 MICROGram(s) Oral daily  citric acid/sodium citrate Solution 15 milliLiter(s) Oral two times a day  epoetin mejia-epbx (RETACRIT) Injectable 27544 Unit(s) SubCutaneous <User Schedule>  folic acid 1 milliGRAM(s) Oral daily  multivitamin 1 Tablet(s) Oral daily  sevelamer carbonate 800 milliGRAM(s) Oral three times a day  sodium bicarbonate 1300 milliGRAM(s) Oral three times a day  thiamine 100 milliGRAM(s) Oral daily          Vital Signs Last 24 Hrs  T(C): 36.9 (14 Jan 2023 10:31), Max: 36.9 (14 Jan 2023 06:27)  T(F): 98.4 (14 Jan 2023 10:31), Max: 98.4 (14 Jan 2023 06:27)  HR: 59 (14 Jan 2023 10:31) (59 - 83)  BP: 100/59 (14 Jan 2023 10:31) (100/59 - 134/73)  BP(mean): --  RR: 18 (14 Jan 2023 10:31) (12 - 19)  SpO2: 100% (14 Jan 2023 10:31) (99% - 100%)    Parameters below as of 14 Jan 2023 10:31  Patient On (Oxygen Delivery Method): room air        PAIN SCORE: 8/10      SCALE USED: (1-10 VNRS)             PHYSICAL EXAM:    GENERAL: NAD, well-groomed, well-developed        LABS:                          8.1    9.34  )-----------( 271      ( 14 Jan 2023 05:43 )             24.8     01-14    135  |  104  |  74<H>  ----------------------------<  76  3.7   |  16<L>  |  4.69<H>    Ca    7.7<L>      14 Jan 2023 05:43  Phos  4.7     01-14  Mg     1.70     01-14    TPro  5.6<L>  /  Alb  2.4<L>  /  TBili  0.2  /  DBili  x   /  AST  18  /  ALT  20  /  AlkPhos  115  01-14        Patient seen at bedside, complaining of pain located on his abdomen radiating up to his chest, neck and back. Patient reports chronic abdominal pain from pancreatitis. Reports pain is sharp, constant, and shooting. Patient denies nausea and vomiting. Patient takes Percocet 10- 15mg Q4H at home for pain at home which helps him with his pain as per patient. Patient’s pain managed outpatient by his PCP Dr. Hurtado. Patient states she was promised IV pain medications and requesting IV Dilaudid/ Morphine. Patient denies alcohol use, smoking and illicit drug use. Denies anxiety and depression. Patient alert and orientedx4. Maintains eye contact. Not in any apparent distress. Discussed opioid rotation with PO Dilaudid but patient states he would rather take his home dose of PO Oxycodone 10mg.  RECOMMENDATIONS:  Recommend PO Acetaminophen 650mg Q6H PRN for mild pain. Not to be given within 6 hours of last dose of Acetaminophen.  Recommend PO Tizanidine 2mg Q8H PRN for muscle cramps. Hold for over sedation.  Discontinue current orders for Oxycodone instead order,  PO Oxycodone 10mg Q4H PRN for moderate to severe pain. Hold for over sedation. Only one dose of Oxycodone in any 3 hours.  4)	Recommend Lidocaine patch to abdomen. 12 hours ON/12 hours OFF X5 DAYS.  5)	Recommend follow up with chronic pain management outpatient upon discharge from hospital.   I STOP reviewed and found:  Oxycodone hcl IR 5mg tablets, quantity of 336 pills for 28 days. Last dispensed on 10/11/22.  Discussed patient with chronic pain management MD Dr. Villasenor, who agrees with the above plan.  Pain service to sign off. Please call pain service if further assistance needed with pain management.

## 2023-01-15 NOTE — PROGRESS NOTE ADULT - PROBLEM SELECTOR PLAN 1
Diff inclu  GIB iso PUD and medication non-adherence (intermittently refusing PPI/ Carafate) +/- anemia of chronic disease. Work up not consistent w/ hemolysis   -Previous EGD 12/16/22 w/ esophagitis  -Repeat EGD 1/13 w/o active bleeding  -Colonoscopy 1/13 w/o active bleeding. W/ mucosal ulceration. Biopsied  -F/u biopsy  -s/p 3 units PRBC on this admission  -c/w PPI BID  -C/w Carafate 1G Q6H  -Outpatient GI follow up w/ repeat upper endoscopy in 6-8 weeks and colonoscopy  -CT Abd pelvis to r/o RP bleed

## 2023-01-15 NOTE — PROGRESS NOTE ADULT - PROBLEM SELECTOR PLAN 7
Septic shock due to pseudomonal PNA with lung abscess, morganella bacteremia and kelbsiella UTI.  Was [facility] Zosyn since 11[facility]25, [facility] antibiotics versus possibly longer pending imaging at 4 weeks (12/23)  - CXR 12/23 - Stable RUL lesion, with new RLL pneumonia   - seen by ID  - completed 6 wks zosyn 1/6  - per d/w ID no further imaging as 6 wks abx sufficient treatment

## 2023-01-15 NOTE — PROGRESS NOTE ADULT - PROBLEM SELECTOR PLAN 3
S/p HD X 3  in ICU in [facility], Cr plateaued at 5.7-6, off HD now; per renal plan for AVF creation on this admission as per d/w OP nephrologist baseline Cr is 3 and nearing ESRD  - [facility] renal duplex with severe stenosis of the left renal artery; vascular consulted, no plan for intervention for HESHAM  - sevelamer 800 mg TID & bicarb 650 mg TID;  calcitriol 0.25 for secondary hyperparathyroidism per renal  - s/p AVF w/ vascular on 1/5 to the L arm, L arm precautions  - avoid nephrotoxic  -renally dose meds  -trend Cr

## 2023-01-15 NOTE — PROGRESS NOTE ADULT - ASSESSMENT
62M EtoH misuse c/b chronic pancreatitis, HCV s/p treatment (SVR), emphysema, benign esophageal stricture (s/p stent on 4/2022) admitted at [facility] w/ septic shock septic shock due to pseudomonal PNA, lung abscess, Morganella bacteremia and Klebsiella UTI (now s/p 6 wks zosyn ended 1/6) w/ hospital course c/b YUNIEL on CKD requiring HD and MICU stay, transferred here for intractable nausea/vomiting and esophageal stent removal, now s/p removal w/ EGD sig for esophagitis/gastric ulcer. Hospital course now c/b acute on chronic anemia

## 2023-01-15 NOTE — PROGRESS NOTE ADULT - PROBLEM SELECTOR PLAN 2
Chronic pancreatitis; previously on Creon, will resume given likely exocrine insufficiency with malabsorption given BMI of 17.   - w/ persistent abdominal pain   - c/w Creon, refusing, educated on importance   - c/w oxycodone increase to 10 mg q4h prn  - Lidocaine patch to abdomen   - Bowel regimen on opioids  - c/w reglan 5mg TID given mod food residue on EGD  - Pain mgt consulted; appreciate recs

## 2023-01-15 NOTE — PROGRESS NOTE ADULT - SUBJECTIVE AND OBJECTIVE BOX
Priya Arce     62y old  Male who presents with a chief complaint of intractable N/V , esophageal stent removal (14 Jan 2023 15:51)      SUBJECTIVE / OVERNIGHT EVENTS: No acute overnight events. Tolerating diet but w/ persistent abdominal pain     MEDICATIONS  (STANDING):  albuterol/ipratropium for Nebulization. 3 milliLiter(s) Nebulizer once  calcitriol   Capsule 0.25 MICROGram(s) Oral daily  citric acid/sodium citrate Solution 15 milliLiter(s) Oral two times a day  epoetin mejia-epbx (RETACRIT) Injectable 13715 Unit(s) SubCutaneous <User Schedule>  folic acid 1 milliGRAM(s) Oral daily  heparin   Injectable 5000 Unit(s) SubCutaneous every 8 hours  lidocaine   4% Patch 1 Patch Transdermal every 24 hours  metoclopramide 5 milliGRAM(s) Oral three times a day  multivitamin 1 Tablet(s) Oral daily  NIFEdipine XL 30 milliGRAM(s) Oral daily  pancrelipase  (CREON  6,000 Lipase Units) 1 Capsule(s) Oral three times a day with meals  pantoprazole    Tablet 40 milliGRAM(s) Oral every 12 hours  polyethylene glycol 3350 17 Gram(s) Oral two times a day  senna 2 Tablet(s) Oral at bedtime  sevelamer carbonate 800 milliGRAM(s) Oral three times a day  sodium bicarbonate 1300 milliGRAM(s) Oral three times a day  sucralfate 1 Gram(s) Oral every 6 hours  thiamine 100 milliGRAM(s) Oral daily    MEDICATIONS  (PRN):  bisacodyl 5 milliGRAM(s) Oral every 12 hours PRN Constipation  ondansetron Injectable 4 milliGRAM(s) IV Push every 6 hours PRN Nausea and/or Vomiting  oxyCODONE    IR 10 milliGRAM(s) Oral every 4 hours PRN Moderate Pain (4 - 6)-Severe pain (7-10)  tiZANidine 2 milliGRAM(s) Oral every 8 hours PRN Muscle Cramps    Allergies    Tylenol (Other)    Intolerances        Vital Signs Last 24 Hrs  T(C): 36.9 (15 Josiah 2023 11:13), Max: 37.2 (14 Jan 2023 17:14)  T(F): 98.4 (15 Josiah 2023 11:13), Max: 99 (14 Jan 2023 17:14)  HR: 88 (15 Josiah 2023 11:13) (78 - 90)  BP: 139/78 (15 Josiah 2023 11:13) (125/60 - 139/78)  BP(mean): --  RR: 18 (15 Josiah 2023 11:13) (17 - 18)  SpO2: 100% (15 Josiah 2023 11:13) (100% - 100%)    Parameters below as of 15 Josiah 2023 11:13  Patient On (Oxygen Delivery Method): room air      Daily     Daily   CAPILLARY BLOOD GLUCOSE        I&O's Summary    14 Jan 2023 07:01  -  15 Josiah 2023 07:00  --------------------------------------------------------  IN: 0 mL / OUT: 300 mL / NET: -300 mL        PHYSICAL EXAM:  GENERAL: NAD, thin  CHEST/LUNG: Clear to auscultation bilaterally; No wheeze  HEART: Regular rate and rhythm; No murmurs, rubs, or gallops  ABDOMEN: Soft, w/ pain in upper abd but w/o TTP.  Bowel sounds present  EXTREMITIES:   warm and well perfused, No clubbing, cyanosis, or edema; R elbow swelling in dependent area   PSYCH: AAOx3, odd affect, intermittently guarded  NEUROLOGY: non-focal  L AVF dressing c/d/i    LABS:                        7.1    8.72  )-----------( 262      ( 15 Josiah 2023 06:59 )             21.8     Hgb Trend: 7.1<--, 8.1<--, 6.9<--, 7.1<--, 7.3<--  01-14    135  |  104  |  74<H>  ----------------------------<  76  3.7   |  16<L>  |  4.69<H>    Ca    7.7<L>      14 Jan 2023 05:43  Phos  4.7     01-14  Mg     1.70     01-14    TPro  5.6<L>  /  Alb  2.4<L>  /  TBili  0.2  /  DBili  x   /  AST  18  /  ALT  20  /  AlkPhos  115  01-14    Creatinine Trend: 4.69<--, 4.86<--, 4.59<--, 4.63<--, 4.78<--, 4.68<--  LIVER FUNCTIONS - ( 14 Jan 2023 05:43 )  Alb: 2.4 g/dL / Pro: 5.6 g/dL / ALK PHOS: 115 U/L / ALT: 20 U/L / AST: 18 U/L / GGT: x                     RADIOLOGY & ADDITIONAL TESTS:    Imaging Personally Reviewed.    Consultant(s) Notes Reviewed.    Care Discussed with Consultants/Other Providers.

## 2023-01-16 NOTE — PROGRESS NOTE ADULT - PROBLEM SELECTOR PLAN 1
Diff inclu  GIB iso PUD and medication non-adherence (intermittently refusing PPI/ Carafate) +/- anemia of chronic disease. Work up not consistent w/ hemolysis   -CT Abd pelvis w/o RP bleed  -Previous EGD 12/16/22 w/ esophagitis  -Repeat EGD 1/13 w/o active bleeding  -Colonoscopy 1/13 w/o active bleeding. W/ mucosal ulceration. Biopsied  -F/u biopsy  -s/p 3 units PRBC on this admission  -c/w PPI BID  -C/w Carafate 1G Q6H  -Outpatient GI follow up w/ repeat upper endoscopy in 6-8 weeks and colonoscopy

## 2023-01-16 NOTE — PROGRESS NOTE ADULT - PROBLEM SELECTOR PLAN 12
SQH  CLD advance as tolerated   pending CARMEN placement when stable SQH  Regular renal diet    pending CARMEN placement

## 2023-01-16 NOTE — PROGRESS NOTE ADULT - SUBJECTIVE AND OBJECTIVE BOX
Priya Arce    62y old  Male who presents with a chief complaint of intractable N/V , esophageal stent removal (15 Josiah 2023 16:53)      SUBJECTIVE / OVERNIGHT EVENTS: No acute overnight events. This morning pt states pain is a little better. Woke up "without being in too much pain." No nausea no vomiting. Had 3 soft BMs this morning. No blood in stool or urine.    Denies fevers, chills, nausea, vomiting, chest pain, SOB.    MEDICATIONS  (STANDING):  albuterol/ipratropium for Nebulization. 3 milliLiter(s) Nebulizer once  calcitriol   Capsule 0.25 MICROGram(s) Oral daily  citric acid/sodium citrate Solution 15 milliLiter(s) Oral two times a day  epoetin mejia-epbx (RETACRIT) Injectable 26725 Unit(s) SubCutaneous <User Schedule>  folic acid 1 milliGRAM(s) Oral daily  heparin   Injectable 5000 Unit(s) SubCutaneous every 8 hours  lidocaine   4% Patch 1 Patch Transdermal every 24 hours  magnesium sulfate  IVPB 1 Gram(s) IV Intermittent once  metoclopramide 5 milliGRAM(s) Oral three times a day  multivitamin 1 Tablet(s) Oral daily  NIFEdipine XL 30 milliGRAM(s) Oral daily  pancrelipase  (CREON  6,000 Lipase Units) 1 Capsule(s) Oral three times a day with meals  pantoprazole    Tablet 40 milliGRAM(s) Oral every 12 hours  polyethylene glycol 3350 17 Gram(s) Oral two times a day  senna 2 Tablet(s) Oral at bedtime  sevelamer carbonate 800 milliGRAM(s) Oral three times a day  sodium bicarbonate 1300 milliGRAM(s) Oral three times a day  sucralfate 1 Gram(s) Oral every 6 hours  thiamine 100 milliGRAM(s) Oral daily    MEDICATIONS  (PRN):  bisacodyl 5 milliGRAM(s) Oral every 12 hours PRN Constipation  ondansetron Injectable 4 milliGRAM(s) IV Push every 6 hours PRN Nausea and/or Vomiting  oxyCODONE    IR 10 milliGRAM(s) Oral every 4 hours PRN Moderate Pain (4 - 6)-Severe pain (7-10)  tiZANidine 2 milliGRAM(s) Oral every 8 hours PRN Muscle Cramps    Allergies    Tylenol (Other)    Intolerances        Vital Signs Last 24 Hrs  T(C): 36.5 (16 Jan 2023 10:00), Max: 36.8 (15 Josiah 2023 17:38)  T(F): 97.7 (16 Jan 2023 10:00), Max: 98.3 (15 Josiah 2023 17:38)  HR: 86 (16 Jan 2023 10:00) (86 - 97)  BP: 129/86 (16 Jan 2023 10:00) (129/67 - 144/85)  BP(mean): --  RR: 18 (16 Jan 2023 10:00) (18 - 19)  SpO2: 100% (16 Jan 2023 10:00) (100% - 100%)    Parameters below as of 16 Jan 2023 10:00  Patient On (Oxygen Delivery Method): room air      Daily     Daily   CAPILLARY BLOOD GLUCOSE        I&O's Summary      PHYSICAL EXAM:  GENERAL: NAD, thin  CHEST/LUNG: Clear to auscultation bilaterally; No wheeze  HEART: Regular rate and rhythm; No murmurs, rubs, or gallops  ABDOMEN: Soft, w/ pain in upper abd but w/o TTP.  Bowel sounds present  EXTREMITIES:   warm and well perfused, No clubbing, cyanosis, or edema; R elbow swelling in dependent area   PSYCH: AAOx3, odd affect, intermittently guarded  NEUROLOGY: non-focal  L AVF dressing c/d/i    LABS:                        7.3    9.73  )-----------( 264      ( 16 Jan 2023 11:00 )             23.3     Hgb Trend: 7.3<--, 8.1<--, 7.1<--, 8.1<--, 6.9<--  01-16    140  |  110<H>  |  75<H>  ----------------------------<  127<H>  3.5   |  15<L>  |  5.29<H>    Ca    7.5<L>      16 Jan 2023 11:00  Phos  3.7     01-16  Mg     1.40     01-16      Creatinine Trend: 5.29<--, 4.69<--, 4.86<--, 4.59<--, 4.63<--, 4.78<--              RADIOLOGY & ADDITIONAL TESTS:    Imaging Personally Reviewed.    Consultant(s) Notes Reviewed.    Care Discussed with Consultants/Other Providers.

## 2023-01-16 NOTE — PROGRESS NOTE ADULT - ASSESSMENT
62M EtoH misuse c/b chronic pancreatitis, HCV s/p treatment (SVR), emphysema, benign esophageal stricture (s/p stent on 4/2022) admitted at [facility] w/ septic shock septic shock due to pseudomonal PNA, lung abscess, Morganella bacteremia and Klebsiella UTI (now s/p 6 wks zosyn ended 1/6) w/ hospital course c/b YUNIEL on CKD requiring HD and MICU stay, transferred here for intractable nausea/vomiting and esophageal stent removal, now s/p removal w/ EGD sig for esophagitis/gastric ulcer. Hospital course now c/b acute on chronic anemia

## 2023-01-16 NOTE — PROGRESS NOTE ADULT - PROBLEM SELECTOR PLAN 3
S/p HD X 3  in ICU in [facility], Cr plateaued at 5.7-6, off HD now; per renal plan for AVF creation on this admission as per d/w OP nephrologist baseline Cr is 3 and nearing ESRD  - [facility] renal duplex with severe stenosis of the left renal artery; vascular consulted, no plan for intervention for HESHAM  - sevelamer 800 mg TID & bicarb 650 mg TID;  calcitriol 0.25 for secondary hyperparathyroidism per renal  - s/p AVF w/ vascular on 1/5 to the L arm, L arm precautions  - avoid nephrotoxic  -renally dose meds  -trend Cr

## 2023-01-17 NOTE — PROGRESS NOTE ADULT - SUBJECTIVE AND OBJECTIVE BOX
[facility] DIVISION OF KIDNEY DISEASES AND HYPERTENSION -- FOLLOW UP NOTE  --------------------------------------------------------------------------------  HPI: Pt is a 62-year-old Male with Hx of esophageal stricture (S/p stenting in 5/22), Emphysema, HCV, chronic pancreatitis who initially presented to [facility] on 11/25/22 for AMS. Pt was admitted to [facility] on 11/25 for AMS 2/2 Septic shock. Pt was initially intubated for acute respiratory failure and required pressors for hypotension. Pt's hospital course was complicated by YUNIEL/ATN requiring HD. Pt was extubated and was transferred to [facility] for esophageal stent removal by Dr. Erickson. Initial labs on this admission concerning for elevated Scr. Nephrology team following for YUNIEL on CKD.     On review of [facility], it was noted that Scr was elevated at 2.20 in September 2022. Scr on admission was significantly elevated at 8.94 on 11/25/22. Pt received 1st HD session on 11/26/22. Last HD session was done on 12/1/22. HD catheter was removed as kidney function was recovering. Scr was elevated at 5.28 on transfer from [facility] on 12/15/22. Scr remains elevated/stable at 5-6 range. Scr was elevated/stable at 5.19 on 1/7/23. Scr remain at 5.73 today (1/17/23).     Pt seen and examined this morning. Pt awake and complaining of generalized pain. Denies any chest pain, shortness of breath, or dizziness.    PAST HISTORY  --------------------------------------------------------------------------------  No significant changes to PMH, PSH, FHx, SHx, unless otherwise noted    ALLERGIES & MEDICATIONS  --------------------------------------------------------------------------------  Allergies    Tylenol (Other)    Intolerances      Standing Inpatient Medications  albuterol/ipratropium for Nebulization. 3 milliLiter(s) Nebulizer once  calcitriol   Capsule 0.25 MICROGram(s) Oral daily  citric acid/sodium citrate Solution 15 milliLiter(s) Oral two times a day  epoetin mejia-epbx (RETACRIT) Injectable 18496 Unit(s) SubCutaneous <User Schedule>  folic acid 1 milliGRAM(s) Oral daily  heparin   Injectable 5000 Unit(s) SubCutaneous every 8 hours  lidocaine   4% Patch 1 Patch Transdermal every 24 hours  metoclopramide 5 milliGRAM(s) Oral three times a day  multivitamin 1 Tablet(s) Oral daily  NIFEdipine XL 30 milliGRAM(s) Oral daily  pancrelipase  (CREON  6,000 Lipase Units) 1 Capsule(s) Oral three times a day with meals  pantoprazole    Tablet 40 milliGRAM(s) Oral every 12 hours  polyethylene glycol 3350 17 Gram(s) Oral two times a day  senna 2 Tablet(s) Oral at bedtime  sevelamer carbonate 800 milliGRAM(s) Oral three times a day  sodium bicarbonate 1300 milliGRAM(s) Oral three times a day  sucralfate 1 Gram(s) Oral every 6 hours  thiamine 100 milliGRAM(s) Oral daily    PRN Inpatient Medications  bisacodyl 5 milliGRAM(s) Oral every 12 hours PRN  ondansetron Injectable 4 milliGRAM(s) IV Push every 6 hours PRN  oxyCODONE    IR 10 milliGRAM(s) Oral every 4 hours PRN  tiZANidine 2 milliGRAM(s) Oral every 8 hours PRN      REVIEW OF SYSTEMS  --------------------------------------------------------------------------------  Gen: No fevers/chills  Skin: No rashes  Head/Eyes/Ears: Normal hearing,   Respiratory: No dyspnea, cough  CV: No chest pain  GI: No abdominal pain, diarrhea  : No dysuria, hematuria  MSK: No  edema  Heme: No easy bruising or bleeding  Psych: No significant depression      All other systems were reviewed and are negative, except as noted.    VITALS/PHYSICAL EXAM  --------------------------------------------------------------------------------  T(C): 37.1 (01-16-23 @ 23:31), Max: 37.2 (01-16-23 @ 17:06)  HR: 97 (01-16-23 @ 23:31) (97 - 100)  BP: 138/62 (01-16-23 @ 23:31) (132/59 - 138/62)  RR: 17 (01-16-23 @ 23:31) (17 - 18)  SpO2: 100% (01-16-23 @ 23:31) (100% - 100%)  Wt(kg): --          Physical Exam:  	Gen: NAD  	HEENT: MMM  	Pulm: CTA B/L  	CV: S1S2  	Abd: Soft, +BS   	Ext: No LE edema B/L  	Neuro: Awake  	Skin: Warm and dry  	Vascular access:      LABS/STUDIES  --------------------------------------------------------------------------------              6.9    9.91  >-----------<  268      [01-17-23 @ 06:44]              21.6     140  |  111  |  83  ----------------------------<  114      [01-17-23 @ 06:44]  3.5   |  15  |  5.72        Ca     7.6     [01-17-23 @ 06:44]      Mg     1.50     [01-17-23 @ 06:44]      Phos  3.7     [01-17-23 @ 06:44]            Creatinine Trend:  SCr 5.72 [01-17 @ 06:44]  SCr 5.29 [01-16 @ 11:00]  SCr 4.69 [01-14 @ 05:43]  SCr 4.86 [01-13 @ 05:34]  SCr 4.59 [01-12 @ 05:50]        Iron 95, TIBC <125, %sat --      [01-16-23 @ 11:00]  Ferritin 311      [01-03-23 @ 06:12]  PTH -- (Ca --)      [12-22-22 @ 06:40]   290  Vitamin D (25OH) 10.4      [01-04-23 @ 06:45]  HbA1c 5.0      [03-22-19 @ 09:03]  TSH 1.380      [09-01-22 @ 08:58]  Lipid: chol 147, TG 88, HDL 59, LDL --      [09-01-22 @ 08:58]       [facility] DIVISION OF KIDNEY DISEASES AND HYPERTENSION -- FOLLOW UP NOTE  --------------------------------------------------------------------------------  HPI: Pt is a 62-year-old Male with Hx of esophageal stricture (S/p stenting in 5/22), Emphysema, HCV, chronic pancreatitis who initially presented to [facility] on 11/25/22 for AMS. Pt was admitted to [facility] on 11/25 for AMS 2/2 Septic shock. Pt was initially intubated for acute respiratory failure and required pressors for hypotension. Pt's hospital course was complicated by YUNIEL/ATN requiring HD. Pt was extubated and was transferred to [facility] for esophageal stent removal by Dr. Erickson. Initial labs on this admission concerning for elevated Scr. Nephrology team following for YUNIEL on CKD.     On review of [facility], it was noted that Scr was elevated at 2.20 in September 2022. Scr on admission was significantly elevated at 8.94 on 11/25/22. Pt received 1st HD session on 11/26/22. Last HD session was done on 12/1/22. HD catheter was removed as kidney function was recovering. Scr was elevated at 5.28 on transfer from [facility] on 12/15/22. Scr remains elevated/stable at 5-6 range. Scr was elevated/stable at 5.19 on 1/7/23. Scr remain at 5.72 today (1/17/23).     Pt seen and examined this morning. Pt awake, waling in the room and complaining of generalized pain. Denies any chest pain, shortness of breath, or dizziness.    PAST HISTORY  --------------------------------------------------------------------------------  No significant changes to PMH, PSH, FHx, SHx, unless otherwise noted    ALLERGIES & MEDICATIONS  --------------------------------------------------------------------------------  Allergies    Tylenol (Other)    Intolerances    Standing Inpatient Medications  albuterol/ipratropium for Nebulization. 3 milliLiter(s) Nebulizer once  calcitriol   Capsule 0.25 MICROGram(s) Oral daily  citric acid/sodium citrate Solution 15 milliLiter(s) Oral two times a day  epoetin mejia-epbx (RETACRIT) Injectable 22510 Unit(s) SubCutaneous <User Schedule>  folic acid 1 milliGRAM(s) Oral daily  heparin   Injectable 5000 Unit(s) SubCutaneous every 8 hours  lidocaine   4% Patch 1 Patch Transdermal every 24 hours  metoclopramide 5 milliGRAM(s) Oral three times a day  multivitamin 1 Tablet(s) Oral daily  NIFEdipine XL 30 milliGRAM(s) Oral daily  pancrelipase  (CREON  6,000 Lipase Units) 1 Capsule(s) Oral three times a day with meals  pantoprazole    Tablet 40 milliGRAM(s) Oral every 12 hours  polyethylene glycol 3350 17 Gram(s) Oral two times a day  senna 2 Tablet(s) Oral at bedtime  sevelamer carbonate 800 milliGRAM(s) Oral three times a day  sodium bicarbonate 1300 milliGRAM(s) Oral three times a day  sucralfate 1 Gram(s) Oral every 6 hours  thiamine 100 milliGRAM(s) Oral daily    PRN Inpatient Medications  bisacodyl 5 milliGRAM(s) Oral every 12 hours PRN  ondansetron Injectable 4 milliGRAM(s) IV Push every 6 hours PRN  oxyCODONE    IR 10 milliGRAM(s) Oral every 4 hours PRN  tiZANidine 2 milliGRAM(s) Oral every 8 hours PRN    REVIEW OF SYSTEMS  --------------------------------------------------------------------------------  Gen: No fevers   Respiratory: No dyspnea  CV: No chest pain  GI: No abdominal pain  : No dysuria  MSK: No  edema  Neuro: no dizziness   All other systems were reviewed and are negative, except as noted.    VITALS/PHYSICAL EXAM  --------------------------------------------------------------------------------  T(C): 37.1 (01-16-23 @ 23:31), Max: 37.2 (01-16-23 @ 17:06)  HR: 97 (01-16-23 @ 23:31) (97 - 100)  BP: 138/62 (01-16-23 @ 23:31) (132/59 - 138/62)  RR: 17 (01-16-23 @ 23:31) (17 - 18)  SpO2: 100% (01-16-23 @ 23:31) (100% - 100%)  Wt(kg): --    Physical Exam:  	Gen NAD  	HEENT: no JVD  	Pulm: CTABL  	CV: S1S2,  	Abd: Soft,   	Ext:   - edema B/L LE   	Neuro: Awake and alert  	Skin: Warm and dry              Vascular: DARYL WILLIS + bruit    LABS/STUDIES  --------------------------------------------------------------------------------              6.9    9.91  >-----------<  268      [01-17-23 @ 06:44]              21.6     140  |  111  |  83  ----------------------------<  114      [01-17-23 @ 06:44]  3.5   |  15  |  5.72        Ca     7.6     [01-17-23 @ 06:44]      Mg     1.50     [01-17-23 @ 06:44]      Phos  3.7     [01-17-23 @ 06:44]    Creatinine Trend:  SCr 5.72 [01-17 @ 06:44]  SCr 5.29 [01-16 @ 11:00]  SCr 4.69 [01-14 @ 05:43]  SCr 4.86 [01-13 @ 05:34]  SCr 4.59 [01-12 @ 05:50]    Iron 95, TIBC <125, %sat --      [01-16-23 @ 11:00]  Ferritin 311      [01-03-23 @ 06:12]  PTH -- (Ca --)      [12-22-22 @ 06:40]   290  Vitamin D (25OH) 10.4      [01-04-23 @ 06:45]  HbA1c 5.0      [03-22-19 @ 09:03]  TSH 1.380      [09-01-22 @ 08:58]  Lipid: chol 147, TG 88, HDL 59, LDL --      [09-01-22 @ 08:58]

## 2023-01-17 NOTE — PROGRESS NOTE ADULT - PROBLEM SELECTOR PLAN 4
Pt. with acidosis in setting of renal failure. Serum CO2 low at 15 today. Currently on Bicitra 15ml PO BID (although not taking it )and sodium bicarb tabs 1300 gm OP TID. Monitor SCO2    If you have any questions, please feel free to contact me  Jeff Pete  Nephrology Fellow  [phone]/ [employer] Teams(Preferred)  (After 5pm or on weekends please page the on-call fellow).

## 2023-01-17 NOTE — PROGRESS NOTE ADULT - ASSESSMENT
62M EtoH misuse c/b chronic pancreatitis, HCV s/p treatment (SVR), emphysema, benign esophageal stricture (s/p stent on 4/2022) admitted at [facility] w/ septic shock septic shock due to pseudomonal PNA, lung abscess, Morganella bacteremia and Klebsiella UTI (now s/p 6 wks zosyn ended 1/6) w/ hospital course c/b YUNIEL on CKD requiring HD and MICU stay, transferred here for intractable nausea/vomiting and esophageal stent removal, now s/p removal w/ EGD sig for esophagitis/gastric ulcer. Hospital course now c/b acute on chronic anemia required multiple transfusion.

## 2023-01-17 NOTE — PROGRESS NOTE ADULT - PROBLEM SELECTOR PLAN 3
Pt. with anemia suspected in setting of renal failure. Received IV iron. Currently on Epo 10K three times a week. Blood transfusion per primary team. Monitor Hb.

## 2023-01-17 NOTE — PROGRESS NOTE ADULT - SUBJECTIVE AND OBJECTIVE BOX
[facility] Division of Hospital Medicine  Ruben Gonsales MD  In House Pager [phone]    Patient is a 62y old  Male who presents with a chief complaint of intractable N/V , esophageal stent removal (17 Jan 2023 10:16)    SUBJECTIVE / OVERNIGHT EVENTS: no acute events. patient Hb 6.9 this morning. Will transfuse additional unit. Cr uptrending. Electrolyte stable. Patient continue to express "psychokinetics" is doing this to him.     ROS: Denied Fever, Chill, CP, SOB, Abd pain, N/V/D, LE swelling or pain.     MEDICATIONS  (STANDING):  albuterol/ipratropium for Nebulization. 3 milliLiter(s) Nebulizer once  calcitriol   Capsule 0.25 MICROGram(s) Oral daily  citric acid/sodium citrate Solution 15 milliLiter(s) Oral two times a day  epoetin mejia-epbx (RETACRIT) Injectable 18346 Unit(s) SubCutaneous <User Schedule>  folic acid 1 milliGRAM(s) Oral daily  heparin   Injectable 5000 Unit(s) SubCutaneous every 8 hours  lidocaine   4% Patch 1 Patch Transdermal every 24 hours  metoclopramide 5 milliGRAM(s) Oral three times a day  multivitamin 1 Tablet(s) Oral daily  NIFEdipine XL 30 milliGRAM(s) Oral daily  pancrelipase  (CREON  6,000 Lipase Units) 1 Capsule(s) Oral three times a day with meals  pantoprazole    Tablet 40 milliGRAM(s) Oral every 12 hours  polyethylene glycol 3350 17 Gram(s) Oral two times a day  senna 2 Tablet(s) Oral at bedtime  sevelamer carbonate 800 milliGRAM(s) Oral three times a day  sodium bicarbonate 1300 milliGRAM(s) Oral three times a day  sucralfate 1 Gram(s) Oral every 6 hours  thiamine 100 milliGRAM(s) Oral daily    MEDICATIONS  (PRN):  bisacodyl 5 milliGRAM(s) Oral every 12 hours PRN Constipation  ondansetron Injectable 4 milliGRAM(s) IV Push every 6 hours PRN Nausea and/or Vomiting  oxyCODONE    IR 10 milliGRAM(s) Oral every 4 hours PRN Moderate Pain (4 - 6)-Severe pain (7-10)  tiZANidine 2 milliGRAM(s) Oral every 8 hours PRN Muscle Cramps    CAPILLARY BLOOD GLUCOSE        I&O's Summary    PHYSICAL EXAM:  Vital Signs Last 24 Hrs  T(C): 37.1 (17 Jan 2023 10:00), Max: 37.2 (16 Jan 2023 17:06)  T(F): 98.7 (17 Jan 2023 10:00), Max: 98.9 (16 Jan 2023 17:06)  HR: 96 (17 Jan 2023 10:00) (96 - 100)  BP: 128/70 (17 Jan 2023 10:00) (128/70 - 138/62)  BP(mean): --  RR: 18 (17 Jan 2023 10:00) (17 - 18)  SpO2: 96% (17 Jan 2023 10:00) (96% - 100%)    Parameters below as of 16 Jan 2023 23:31  Patient On (Oxygen Delivery Method): room air      Gen: NAD; sitting in bed comfortably   Pulm: no accessory muscle use; lungs clear on auscultation bilaterally; no wheezing or crackles.   Cards: RRR, nl S1/S2; no LE edema; no JVD  Abd: non-distended; soft and NT on exam; +bs  Ext: DOMINIC; no joint effusion or tenderness in upper and lower extremities; no cyanosis  Neuro: Awake and Alert; non-focal; moving all extremities.   Skin: no new rashes; warm to touch;     LABS:                        6.9    9.91  )-----------( 268      ( 17 Jan 2023 06:44 )             21.6     01-17    140  |  111<H>  |  83<H>  ----------------------------<  114<H>  3.5   |  15<L>  |  5.72<H>    Ca    7.6<L>      17 Jan 2023 06:44  Phos  3.7     01-17  Mg     1.50     01-17                RADIOLOGY & ADDITIONAL TESTS:  Results Reviewed: Y  Imaging Personally Reviewed: Y  Electrocardiogram Personally Reviewed: Y    COORDINATION OF CARE:  Care Discussed with Consultants/Other Providers [Y/N]: Y  Prior or Outpatient Records Reviewed [Y/N]: RAMYA

## 2023-01-17 NOTE — PROGRESS NOTE ADULT - PROBLEM SELECTOR PLAN 1
Diff inclu  GIB iso PUD and medication non-adherence (intermittently refusing PPI/ Carafate) +/- anemia of chronic disease. Work up not consistent w/ hemolysis   -CT Abd pelvis w/o RP bleed  -Previous EGD 12/16/22 w/ esophagitis  -Repeat EGD 1/13 w/o active bleeding  -Colonoscopy 1/13 w/o active bleeding. W/ mucosal ulceration. Biopsied  -F/u biopsy  -s/p 3 units PRBC on this admission  -c/w PPI BID  -C/w Carafate 1G Q6H  -Outpatient GI follow up w/ repeat upper endoscopy in 6-8 weeks and colonoscopy  - will transfuse additional unit today.

## 2023-01-17 NOTE — PROGRESS NOTE ADULT - PROBLEM SELECTOR PLAN 7
Septic shock due to pseudomonal PNA with lung abscess, morganella bacteremia and kelbsiella UTI.  [facility]25[facility] antibiotics versus possibly longer pending imaging at 4 weeks (12/23)  - CXR 12/23 - Stable RUL lesion, with new RLL pneumonia   - seen by ID  - completed 6 wks zosyn 1/6  - per d/w ID no further imaging as 6 wks abx sufficient treatment

## 2023-01-17 NOTE — PROGRESS NOTE ADULT - PROBLEM SELECTOR PLAN 2
Secondary hyperparathyroidism. Ionized calcium was low. Continue with calcitriol 0.25mcg daily. Continue Sevelamer 800mg mg TID with meals. Monitor phos levels

## 2023-01-17 NOTE — PROGRESS NOTE ADULT - PROBLEM SELECTOR PLAN 1
Pt with YUNIEL on CKD likely multifactorial in the setting of recent COVID infection, poor oral intake. Scr was elevated at 2.20 in September 2022. Pt was recently admitted at [facility]. Scr on admission was significantly elevated at 8.94 on 11/25/22. Pt received 1st HD session on 11/26/22. Last HD session was done on 12/1/22. HD catheter was removed as kidney function was recovering. Scr was elevated at 5.28 on transfer from [facility] on 12/15/22. Scr remained elevated/stable at 5.40 on 1/5/23. Of note kidney sonogram had discrepant size renal artery duplex showing HESHAM but BP well controlled. Discussed with Dr. Dukes (12/21) who knows the patient very well and has seen him inpatient and outpatient over the last several years. As per Dr. Dukes the patient's baseline creatinine is 3 and has advanced CKD and is nearing ESKD. Pt underwent cardiac cath on 1/4/23 , tolerated procedure well. Underwent LUE AVF creation on 1/5/23. Last Scr is elevated/stable at 5.72 today (1/17/23). Pt non uremic, non oliguric. No urgent indication for HD. Monitor labs and urine output. Avoid any potential nephrotoxins. Dose medications as per eGFR.

## 2023-01-18 NOTE — PROGRESS NOTE ADULT - PROBLEM SELECTOR PLAN 4
Pt. with acidosis in setting of renal failure. Serum CO2 low at 16 today. Currently on Bicitra 15ml PO BID (although not taking it )and sodium bicarb tabs 1300 gm PO TID. Monitor SCO2    If you have any questions, please feel free to contact me  Jeff Pete  Nephrology Fellow  [phone]/ [employer] Teams(Preferred)  (After 5pm or on weekends please page the on-call fellow).

## 2023-01-18 NOTE — PROGRESS NOTE ADULT - PROBLEM SELECTOR PLAN 1
Pt with YUNIEL on CKD likely multifactorial in the setting of recent COVID infection, poor oral intake. Scr was elevated at 2.20 in September 2022. Pt was recently admitted at [facility]. Scr on admission was significantly elevated at 8.94 on 11/25/22. Pt received 1st HD session on 11/26/22. Last HD session was done on 12/1/22. HD catheter was removed as kidney function was recovering. Scr was elevated at 5.28 on transfer from [facility] on 12/15/22. Scr remained elevated/stable at 5.40 on 1/5/23. Of note kidney sonogram had discrepant size renal artery duplex showing HESHAM but BP well controlled. Discussed with Dr. Dukes (12/21) who knows the patient very well and has seen him inpatient and outpatient over the last several years. As per Dr. Dukes the patient's baseline creatinine is 3 and has advanced CKD and is nearing ESKD. Pt underwent cardiac cath on 1/4/23 , tolerated procedure well. Underwent LUE AVF creation on 1/5/23. Last Scr is elevated/stable at 5.63 today (1/18/23). Pt non uremic, non oliguric. No urgent indication for HD. Monitor labs and urine output. Avoid any potential nephrotoxins. Dose medications as per eGFR.

## 2023-01-18 NOTE — PROGRESS NOTE ADULT - PROBLEM SELECTOR PLAN 3
Pt. with anemia suspected in setting of renal failure. Received IV iron. Currently on Epo 10K three times a week. Blood transfusion per primary team. Monitor Hb

## 2023-01-18 NOTE — CONSULT NOTE ADULT - SUBJECTIVE AND OBJECTIVE BOX
Podiatry pager #: [phone]/ [phone]    Patient is a 62y old  Male who presents with a chief complaint of intractable N/V , esophageal stent removal (18 Jan 2023 13:20)      HPI:  62 year old male with Hx of esophageal stricture (s/p stent on 5/2022 by Dr. Hernandez?), Emphysema, HCV, chronic pancreatitis, presenting from [facility] for esophageal stent removal due to intractable n/v. Pt was admitted to [facility] on 11/25 for AMS 2/2 Septic shock due to psuedomonal PNA, Lung abscess, morganella bacteremia and kelbsiella UTI. Pt was initially intubated for acute respiratory failure and required pressors for hypotension . Pt's hospital course was complicated by YUNIEL due to septic ATN requiring 3 rounds of HD. Pt is on now extubated, on 4L of NC, off pressors. Pt developed intractable N/V during his hospital stay and was transferred to [facility] for esophageal stent removal by Dr. Erickson.  Pt was also found to be COVID + on 12/13/22.   Pt alert, awake, AOx3 when seen. Pt states he feels cold and is aggravated. Pt demanding his pain medications for esophageal and body aches.   Denies chest pain, shortness of breath.      (15 Dec 2022 09:21)      PAST MEDICAL & SURGICAL HISTORY:  ETOH abuse  sober x1 year approximately      Pancreatitis      Hepatitis C  treated      Gout      HTN (hypertension)      Chronic kidney disease (CKD)      H/O chronic pancreatitis      Gout      Alcohol abuse      Gastric perforation          MEDICATIONS  (STANDING):  albuterol/ipratropium for Nebulization. 3 milliLiter(s) Nebulizer once  calcitriol   Capsule 0.25 MICROGram(s) Oral daily  epoetin mejia-epbx (RETACRIT) Injectable 52656 Unit(s) SubCutaneous <User Schedule>  folic acid 1 milliGRAM(s) Oral daily  heparin   Injectable 5000 Unit(s) SubCutaneous every 8 hours  lidocaine   4% Patch 1 Patch Transdermal every 24 hours  metoclopramide 5 milliGRAM(s) Oral three times a day  multivitamin 1 Tablet(s) Oral daily  NIFEdipine XL 30 milliGRAM(s) Oral daily  pancrelipase  (CREON  6,000 Lipase Units) 1 Capsule(s) Oral three times a day with meals  pantoprazole    Tablet 40 milliGRAM(s) Oral every 12 hours  polyethylene glycol 3350 17 Gram(s) Oral two times a day  senna 2 Tablet(s) Oral at bedtime  sevelamer carbonate 800 milliGRAM(s) Oral three times a day  sodium bicarbonate 1300 milliGRAM(s) Oral three times a day  sucralfate 1 Gram(s) Oral every 6 hours  thiamine 100 milliGRAM(s) Oral daily    MEDICATIONS  (PRN):  bisacodyl 5 milliGRAM(s) Oral every 12 hours PRN Constipation  ondansetron Injectable 4 milliGRAM(s) IV Push every 6 hours PRN Nausea and/or Vomiting  oxyCODONE    IR 10 milliGRAM(s) Oral every 4 hours PRN Moderate Pain (4 - 6)-Severe pain (7-10)  tiZANidine 2 milliGRAM(s) Oral every 8 hours PRN Muscle Cramps      Allergies    Tylenol (Other)    Intolerances        VITALS:    Vital Signs Last 24 Hrs  T(C): 37.2 (18 Jan 2023 16:45), Max: 37.2 (18 Jan 2023 16:45)  T(F): 98.9 (18 Jan 2023 16:45), Max: 98.9 (18 Jan 2023 16:45)  HR: 96 (18 Jan 2023 16:45) (94 - 102)  BP: 143/64 (18 Jan 2023 16:45) (134/60 - 143/64)  BP(mean): --  RR: 20 (18 Jan 2023 16:45) (16 - 20)  SpO2: 99% (18 Jan 2023 16:45) (98% - 99%)    Parameters below as of 18 Jan 2023 16:45  Patient On (Oxygen Delivery Method): room air        LABS:                          8.0    11.65 )-----------( 261      ( 18 Jan 2023 06:40 )             25.2       01-18    138  |  108<H>  |  86<H>  ----------------------------<  89  3.8   |  16<L>  |  5.63<H>    Ca    7.7<L>      18 Jan 2023 06:40  Phos  4.5     01-18  Mg     1.60     01-18        CAPILLARY BLOOD GLUCOSE              LOWER EXTREMITY PHYSICAL EXAM:    Vasular: DP/PT 0/4, B/L, CFT >3 seconds B/L, Temperature gradient warm to cool, B/L.   Neuro: Epicritic sensation intact to the level of digits, B/L.  Musculoskeletal/Ortho: unremarkable   Skin: Right IPJ hallux wound to subQ, fibrotic wound bed, right foot 2nd and 3rd digit medial DIPJ wound to bone, exposed distal phalanx to both digits, no malodor, scant serosanguinous drainage, necrotic periwound edges. Left foot heel early DTI with no signs of infection.     RADIOLOGY & ADDITIONAL STUDIES:

## 2023-01-18 NOTE — PATIENT PROFILE ADULT - HOW PATIENT ADDRESSED, PROFILE
Contacted [facility], spoke to [place] confirmed fax was received. Verbalized understanding. [email or web address]

## 2023-01-18 NOTE — PROGRESS NOTE ADULT - PROBLEM SELECTOR PLAN 1
Diff inclu  GIB iso PUD and medication non-adherence (intermittently refusing PPI/ Carafate) +/- anemia of chronic disease. Work up not consistent w/ hemolysis   -CT Abd pelvis w/o RP bleed  -Previous EGD 12/16/22 w/ esophagitis  -Repeat EGD 1/13 w/o active bleeding  -Colonoscopy 1/13 w/o active bleeding. W/ mucosal ulceration. Biopsied  -F/u biopsy  -s/p 4 units PRBC on this admission  -c/w PPI BID  -C/w Carafate 1G Q6H  -Outpatient GI follow up w/ repeat upper endoscopy in 6-8 weeks and colonoscopy

## 2023-01-18 NOTE — PROGRESS NOTE ADULT - PROBLEM SELECTOR PLAN 7
Septic shock due to pseudomonal PNA with lung abscess, morganella bacteremia and kelbsiella UTI.  [facility]25[facility] antibiotics versus possibly longer pending imaging at 4 weeks (12/23)  - CXR 12/23 - Stable RUL lesion, with new RLL pneumonia   - seen by ID  - completed 6 wks zosyn 1/6  - per d/w ID no further imaging as 6 wks abx sufficient treatment

## 2023-01-18 NOTE — PROGRESS NOTE ADULT - SUBJECTIVE AND OBJECTIVE BOX
[facility] DIVISION OF KIDNEY DISEASES AND HYPERTENSION -- FOLLOW UP NOTE  --------------------------------------------------------------------------------  HPI: Pt is a 62-year-old Male with Hx of esophageal stricture (S/p stenting in 5/22), Emphysema, HCV, chronic pancreatitis who initially presented to [facility] [place] on 11/25/22 for AMS. Pt was admitted to [facility] [place] on 11/25 for AMS 2/2 Septic shock. Pt was initially intubated for acute respiratory failure and required pressors for hypotension. Pt's hospital course was complicated by YUNIEL/ATN requiring HD. Pt was extubated and was transferred to [facility] for esophageal stent removal by Dr. Erickson. Initial labs on this admission concerning for elevated Scr. Nephrology team following for YUNIEL on CKD.     On review of [facility], it was noted that Scr was elevated at 2.20 in September 2022. Scr on admission was significantly elevated at 8.94 on 11/25/22. Pt received 1st HD session on 11/26/22. Last HD session was done on 12/1/22. HD catheter was removed as kidney function was recovering. Scr was elevated at 5.28 on transfer from [facility] on 12/15/22. Scr remains elevated/stable at 5-6 range. Scr was elevated/stable at 5.19 on 1/7/23. Scr remain at 5.63 today (1/18/23).     Pt seen and examined this morning. Pt awake, waling in the room and complaining of generalized pain. Denies any chest pain, shortness of breath, or dizziness.    PAST HISTORY  --------------------------------------------------------------------------------  No significant changes to PMH, PSH, FHx, SHx, unless otherwise noted    ALLERGIES & MEDICATIONS  --------------------------------------------------------------------------------  Allergies    Tylenol (Other)    Intolerances    Standing Inpatient Medications  albuterol/ipratropium for Nebulization. 3 milliLiter(s) Nebulizer once  calcitriol   Capsule 0.25 MICROGram(s) Oral daily  citric acid/sodium citrate Solution 15 milliLiter(s) Oral two times a day  epoetin mejia-epbx (RETACRIT) Injectable 75842 Unit(s) SubCutaneous <User Schedule>  folic acid 1 milliGRAM(s) Oral daily  heparin   Injectable 5000 Unit(s) SubCutaneous every 8 hours  lidocaine   4% Patch 1 Patch Transdermal every 24 hours  metoclopramide 5 milliGRAM(s) Oral three times a day  multivitamin 1 Tablet(s) Oral daily  NIFEdipine XL 30 milliGRAM(s) Oral daily  pancrelipase  (CREON  6,000 Lipase Units) 1 Capsule(s) Oral three times a day with meals  pantoprazole    Tablet 40 milliGRAM(s) Oral every 12 hours  polyethylene glycol 3350 17 Gram(s) Oral two times a day  senna 2 Tablet(s) Oral at bedtime  sevelamer carbonate 800 milliGRAM(s) Oral three times a day  sodium bicarbonate 1300 milliGRAM(s) Oral three times a day  sucralfate 1 Gram(s) Oral every 6 hours  thiamine 100 milliGRAM(s) Oral daily    PRN Inpatient Medications  bisacodyl 5 milliGRAM(s) Oral every 12 hours PRN  ondansetron Injectable 4 milliGRAM(s) IV Push every 6 hours PRN  oxyCODONE    IR 10 milliGRAM(s) Oral every 4 hours PRN  tiZANidine 2 milliGRAM(s) Oral every 8 hours PRN    REVIEW OF SYSTEMS  --------------------------------------------------------------------------------  Gen: No fevers   Respiratory: No dyspnea  CV: No chest pain  GI: No abdominal pain  : No dysuria  MSK: No  edema  Neuro: no dizziness   All other systems were reviewed and are negative, except as noted.    VITALS/PHYSICAL EXAM  --------------------------------------------------------------------------------  T(C): 36.8 (01-18-23 @ 05:15), Max: 36.8 (01-18-23 @ 05:15)  HR: 94 (01-18-23 @ 05:15) (94 - 102)  BP: 134/60 (01-18-23 @ 05:15) (132/69 - 138/85)  RR: 16 (01-18-23 @ 05:15) (16 - 17)  SpO2: 99% (01-18-23 @ 05:15) (95% - 99%)  Wt(kg): --    Physical Exam:  	Gen NAD  	HEENT: no JVD  	Pulm: CTABL  	CV: S1S2,  	Abd: Soft,   	Ext:   - edema B/L LE   	Neuro: Awake and alert  	Skin: Warm and dry              Vascular: DARYL WILLIS + bruit    LABS/STUDIES  --------------------------------------------------------------------------------              8.0    11.65 >-----------<  261      [01-18-23 @ 06:40]              25.2     138  |  108  |  86  ----------------------------<  89      [01-18-23 @ 06:40]  3.8   |  16  |  5.63        Ca     7.7     [01-18-23 @ 06:40]      Mg     1.60     [01-18-23 @ 06:40]      Phos  4.5     [01-18-23 @ 06:40]    Creatinine Trend:  SCr 5.63 [01-18 @ 06:40]  SCr 5.72 [01-17 @ 06:44]  SCr 5.29 [01-16 @ 11:00]  SCr 4.69 [01-14 @ 05:43]  SCr 4.86 [01-13 @ 05:34]    Iron 95, TIBC <125, %sat --      [01-16-23 @ 11:00]  Ferritin 311      [01-03-23 @ 06:12]  PTH -- (Ca --)      [12-22-22 @ 06:40]   290  Vitamin D (25OH) 10.4      [01-04-23 @ 06:45]  HbA1c 5.0      [03-22-19 @ 09:03]  TSH 1.380      [09-01-22 @ 08:58]  Lipid: chol 147, TG 88, HDL 59, LDL --      [09-01-22 @ 08:58]

## 2023-01-18 NOTE — PROGRESS NOTE ADULT - PROBLEM SELECTOR PLAN 3
S/p HD X 3  in ICU in [facility], Cr plateaued at 5.7-6, off HD now; per renal plan for AVF creation on this admission as per d/w OP nephrologist baseline Cr is 3 and nearing ESRD  - [facility] renal duplex with severe stenosis of the left renal artery; vascular consulted, no plan for intervention for HESHAM  - sevelamer 800 mg TID & bicarb 650 mg TID;  calcitriol 0.25 for secondary hyperparathyroidism per renal  - s/p AVF w/ vascular on 1/5 to the L arm, L arm precautions  - avoid nephrotoxic  -renally dose meds  -trend Cr and electrolytes.  - discussed with nephrology patient is stable for d/c to [facility] from nephrology perspective. will d/c Bicitra as patient is not tolerating it.

## 2023-01-18 NOTE — PROGRESS NOTE ADULT - SUBJECTIVE AND OBJECTIVE BOX
[facility] Division of Hospital Medicine  Ruben Gonsales MD  In House Pager [phone]    Patient is a 62y old  Male who presents with a chief complaint of intractable N/V , esophageal stent removal (18 Jan 2023 12:14)    SUBJECTIVE / OVERNIGHT EVENTS: no acute events.     ROS: Denied Fever, Chill, CP, SOB, Abd pain, N/V/D, LE swelling or pain.     MEDICATIONS  (STANDING):  albuterol/ipratropium for Nebulization. 3 milliLiter(s) Nebulizer once  calcitriol   Capsule 0.25 MICROGram(s) Oral daily  citric acid/sodium citrate Solution 15 milliLiter(s) Oral two times a day  epoetin mejia-epbx (RETACRIT) Injectable 10287 Unit(s) SubCutaneous <User Schedule>  folic acid 1 milliGRAM(s) Oral daily  heparin   Injectable 5000 Unit(s) SubCutaneous every 8 hours  lidocaine   4% Patch 1 Patch Transdermal every 24 hours  metoclopramide 5 milliGRAM(s) Oral three times a day  multivitamin 1 Tablet(s) Oral daily  NIFEdipine XL 30 milliGRAM(s) Oral daily  pancrelipase  (CREON  6,000 Lipase Units) 1 Capsule(s) Oral three times a day with meals  pantoprazole    Tablet 40 milliGRAM(s) Oral every 12 hours  polyethylene glycol 3350 17 Gram(s) Oral two times a day  senna 2 Tablet(s) Oral at bedtime  sevelamer carbonate 800 milliGRAM(s) Oral three times a day  sodium bicarbonate 1300 milliGRAM(s) Oral three times a day  sucralfate 1 Gram(s) Oral every 6 hours  thiamine 100 milliGRAM(s) Oral daily    MEDICATIONS  (PRN):  bisacodyl 5 milliGRAM(s) Oral every 12 hours PRN Constipation  ondansetron Injectable 4 milliGRAM(s) IV Push every 6 hours PRN Nausea and/or Vomiting  oxyCODONE    IR 10 milliGRAM(s) Oral every 4 hours PRN Moderate Pain (4 - 6)-Severe pain (7-10)  tiZANidine 2 milliGRAM(s) Oral every 8 hours PRN Muscle Cramps    CAPILLARY BLOOD GLUCOSE        I&O's Summary    PHYSICAL EXAM:  Vital Signs Last 24 Hrs  T(C): 36.8 (18 Jan 2023 05:15), Max: 36.8 (18 Jan 2023 05:15)  T(F): 98.2 (18 Jan 2023 05:15), Max: 98.2 (18 Jan 2023 05:15)  HR: 94 (18 Jan 2023 05:15) (94 - 102)  BP: 134/60 (18 Jan 2023 05:15) (132/69 - 138/85)  BP(mean): --  RR: 16 (18 Jan 2023 05:15) (16 - 17)  SpO2: 99% (18 Jan 2023 05:15) (95% - 99%)    Parameters below as of 18 Jan 2023 05:15  Patient On (Oxygen Delivery Method): room air      Gen: NAD; sitting in bed comfortably   Pulm: no accessory muscle use; lungs clear on auscultation bilaterally; no wheezing or crackles.   Cards: RRR, nl S1/S2; no LE edema; no JVD  Abd: non-distended; soft and NT on exam; +bs  Ext: DOMINIC; no joint effusion or tenderness in upper and lower extremities; no cyanosis  Neuro: Awake and Alert; non-focal; moving all extremities.   Skin: no new rashes; warm to touch;     LABS:                        8.0    11.65 )-----------( 261      ( 18 Jan 2023 06:40 )             25.2     01-18    138  |  108<H>  |  86<H>  ----------------------------<  89  3.8   |  16<L>  |  5.63<H>    Ca    7.7<L>      18 Jan 2023 06:40  Phos  4.5     01-18  Mg     1.60     01-18                RADIOLOGY & ADDITIONAL TESTS:  Results Reviewed: Y  Imaging Personally Reviewed: Y  Electrocardiogram Personally Reviewed: Y    COORDINATION OF CARE:  Care Discussed with Consultants/Other Providers [Y/N]: Y  Prior or Outpatient Records Reviewed [Y/N]: RAMYA

## 2023-01-18 NOTE — PROGRESS NOTE ADULT - ASSESSMENT
62M EtoH misuse c/b chronic pancreatitis, HCV s/p treatment (SVR), emphysema, benign esophageal stricture (s/p stent on 4/2022) admitted at [facility] w/ septic shock septic shock due to pseudomonal PNA, lung abscess, Morganella bacteremia and Klebsiella UTI (now s/p 6 wks zosyn ended 1/6) w/ hospital course c/b YUNIEL on CKD requiring HD and MICU stay, transferred here for intractable nausea/vomiting and esophageal stent removal, now s/p removal w/ EGD sig for esophagitis/gastric ulcer. Hospital course now c/b acute on chronic anemia required multiple transfusion.

## 2023-01-18 NOTE — CHART NOTE - NSCHARTNOTEFT_GEN_A_CORE
[facility] NIGHT MEDICINE COVERAGE    Notified by RN that pt had 3-4 episodes of emesis, pt not wanting to take PO meds.  Pt assessed at bedside, pt resting on arrival, arousable.  Pt reports he had a "bad taste", reports he has been passing flatus, and has had occasional burping.  Denies N/V, or abdominal pain.  Pt alert, calm, in NAD.  Abdomen is soft, NT/ND, +BS, no rebound or guarding noted.  No emesis episodes witnessed by writer during assessment.  Recommend to c/w current medications for N/V, and monitor for further episodes of emesis.  No signs of obstruction on exam.  Plan d/w RN and pt.  All questions answered.  Pt stable at this time, will continue to monitor.    Vital Signs Last 24 Hrs  T(C): 36.6 (17 Jan 2023 21:35), Max: 37.1 (17 Jan 2023 10:00)  T(F): 97.9 (17 Jan 2023 21:35), Max: 98.7 (17 Jan 2023 10:00)  HR: 102 (17 Jan 2023 21:35) (94 - 102)  BP: 138/85 (17 Jan 2023 21:35) (128/70 - 138/85)  RR: 17 (17 Jan 2023 21:35) (17 - 18)  SpO2: 98% (17 Jan 2023 21:35) (95% - 98%)  O2 Parameters below as of 17 Jan 2023 21:35  Patient On (Oxygen Delivery Method): room air    Ruben Driscoll PA-C  Department of Medicine - [facility]  In-House Pager: #[phone]

## 2023-01-18 NOTE — CONSULT NOTE ADULT - ASSESSMENT
62M with bilateral foot wounds   - Patient seen and evaluated   - Afebrile, WBC 11.65, ESR/CRP ordered   - Right IPJ hallux wound to subQ, fibrotic wound bed, right foot 2nd and 3rd digit medial DIPJ wound to bone, exposed distal phalanx to both digits, no malodor, scant serosanguinous drainage, necrotic periwound edges. Left foot heel early DTI with no signs of infection.   - Bilateral foot xray pending   - No wound culture necessary 2/2 to no acute signs of infection   - Ordered BARRY/PVRs   - Recommend right foot wound care with Iodosorb   - Discussed with patient the importance of having dressing to the right foot to prevent furthering infection, however patient refused at this time   - Recommend offloading bilateral heels with zflows   - Recommend vascular consult given non-palpable pulses   - Pod plan local wound care versus right foot partial 2nd and 3rd ray amputation pending imaging/vascular recs   - Please document medical optimization for possible right foot procedure under anesthesia   - Seen with attending

## 2023-01-19 NOTE — ADVANCED PRACTICE NURSE CONSULT - RECOMMEDATIONS
Vascular consult pending   BARRY/PVR pending    Topical recommendations:     Sacral spine and perineum extending to gluteal fold and bilateral buttock- continue with current recommendations.     Bilateral  feet- Podiatry recs in the chart.     Continue low air loss bed therapy,  turn & reposition q2h with  fluidized pillow, continue moisture management with barrier creams & single breathable pad, continue measures to decrease friction/shear.   Plan discussed with patient- educated on topical wound therapy to optimize wound healing. Patient refusing use of offloading boots despite education of importance. Questions answered.     Please contact Wound/Ostomy Care Service Line if we can be of further assistance (ext [phone]).

## 2023-01-19 NOTE — PROGRESS NOTE ADULT - PROBLEM SELECTOR PLAN 1
b/l heel wounds and right 2nd and 3rd toe wound.   Podiatry concern for deep infection and possible need for amputation.   wound was seen with wound care at bedside, discussed care and per their evaluation, this is slight improved from last evaluation 1 week prior.   BARRY/PVR ordred  will check ESR/CRP.   Xray foot pending.   vascular consulted, f/u recs.

## 2023-01-19 NOTE — PROGRESS NOTE ADULT - SUBJECTIVE AND OBJECTIVE BOX
Patient is a 62y old  Male who presents with a chief complaint of intractable N/V , esophageal stent removal (19 Jan 2023 10:36)       INTERVAL HPI/OVERNIGHT EVENTS:  Patient seen and evaluated at bedside.  Pt is resting comfortable in NAD. Denies N/V/F/C.    Allergies    Tylenol (Other)    Intolerances        Vital Signs Last 24 Hrs  T(C): 36.6 (19 Jan 2023 10:17), Max: 37.2 (18 Jan 2023 16:45)  T(F): 97.8 (19 Jan 2023 10:17), Max: 98.9 (18 Jan 2023 16:45)  HR: 86 (19 Jan 2023 10:17) (86 - 101)  BP: 141/84 (19 Jan 2023 10:17) (136/71 - 143/64)  BP(mean): --  RR: 16 (19 Jan 2023 10:17) (16 - 20)  SpO2: 100% (19 Jan 2023 10:17) (98% - 100%)    Parameters below as of 19 Jan 2023 10:17  Patient On (Oxygen Delivery Method): room air        LABS:                        8.0    11.65 )-----------( 261      ( 18 Jan 2023 06:40 )             25.2     01-18    138  |  108<H>  |  86<H>  ----------------------------<  89  3.8   |  16<L>  |  5.63<H>    Ca    7.7<L>      18 Jan 2023 06:40  Phos  4.5     01-18  Mg     1.60     01-18          CAPILLARY BLOOD GLUCOSE      POCT Blood Glucose.: 130 mg/dL (19 Jan 2023 07:22)      Lower Extremity Physical Exam:    Vasular: DP/PT 0/4, B/L, CFT >3 seconds B/L, Temperature gradient warm to cool, B/L.   Neuro: Epicritic sensation intact to the level of digits, B/L.  Musculoskeletal/Ortho: unremarkable   Skin: Right IPJ hallux wound to subQ, fibrotic wound bed, right foot 2nd and 3rd digit medial DIPJ wound to bone, exposed distal phalanx to both digits, no malodor, scant serosanguinous drainage, necrotic periwound edges. Left foot heel early DTI with no signs of infection.       RADIOLOGY & ADDITIONAL TESTS:

## 2023-01-19 NOTE — PROGRESS NOTE ADULT - ASSESSMENT
62M EtoH misuse c/b chronic pancreatitis, HCV s/p treatment (SVR), emphysema, benign esophageal stricture (s/p stent on 4/2022) admitted at [facility] w/ septic shock septic shock due to pseudomonal PNA, lung abscess, Morganella bacteremia and Klebsiella UTI (now s/p 6 wks zosyn ended 1/6) w/ hospital course c/b YUNIEL on CKD requiring HD and MICU stay, transferred here for intractable nausea/vomiting and esophageal stent removal, now s/p removal w/ EGD sig for esophagitis/gastric ulcer. Hospital course now c/b acute on chronic anemia required multiple transfusion. Now patient is noted to have skin breakdown in Right toes concerning for underline infection secondary to vascular insufficiency.

## 2023-01-19 NOTE — PROGRESS NOTE ADULT - SUBJECTIVE AND OBJECTIVE BOX
[facility] DIVISION OF KIDNEY DISEASES AND HYPERTENSION -- FOLLOW UP NOTE  --------------------------------------------------------------------------------  HPI: Pt is a 62-year-old Male with Hx of esophageal stricture (S/p stenting in 5/22), Emphysema, HCV, chronic pancreatitis who initially presented to [facility] on 11/25/22 for AMS. Pt was admitted to [facility] on 11/25 for AMS 2/2 Septic shock. Pt was initially intubated for acute respiratory failure and required pressors for hypotension. Pt's hospital course was complicated by YUNIEL/ATN requiring HD. Pt was extubated and was transferred to [facility] for esophageal stent removal by Dr. Erickson. Initial labs on this admission concerning for elevated Scr. Nephrology team following for YUNIEL on CKD.     On review of [facility], it was noted that Scr was elevated at 2.20 in September 2022. Scr on admission was significantly elevated at 8.94 on 11/25/22. Pt received 1st HD session on 11/26/22. Last HD session was done on 12/1/22. HD catheter was removed as kidney function was recovering. Scr was elevated at 5.28 on transfer from [facility] on 12/15/22. Scr remains elevated/stable at 5-6 range. Scr was elevated/stable at 5.19 on 1/7/23. Scr remain at 5.63 on 1/18/23.     Pt seen and examined this morning. Pt awake, awaiting his breakfast. Reports feeling hungry. Denies any chest pain, shortness of breath, or dizziness.    PAST HISTORY  --------------------------------------------------------------------------------  No significant changes to PMH, PSH, FHx, SHx, unless otherwise noted    ALLERGIES & MEDICATIONS  --------------------------------------------------------------------------------  Allergies    Tylenol (Other)    Intolerances    Standing Inpatient Medications  albuterol/ipratropium for Nebulization. 3 milliLiter(s) Nebulizer once  cadexomer iodine 0.9% Gel 1 Application(s) Topical daily  calcitriol   Capsule 0.25 MICROGram(s) Oral daily  epoetin mejia-epbx (RETACRIT) Injectable 55795 Unit(s) SubCutaneous <User Schedule>  folic acid 1 milliGRAM(s) Oral daily  heparin   Injectable 5000 Unit(s) SubCutaneous every 8 hours  lidocaine   4% Patch 1 Patch Transdermal every 24 hours  metoclopramide 5 milliGRAM(s) Oral three times a day  multivitamin 1 Tablet(s) Oral daily  NIFEdipine XL 30 milliGRAM(s) Oral daily  pancrelipase  (CREON  6,000 Lipase Units) 1 Capsule(s) Oral three times a day with meals  pantoprazole    Tablet 40 milliGRAM(s) Oral every 12 hours  polyethylene glycol 3350 17 Gram(s) Oral two times a day  senna 2 Tablet(s) Oral at bedtime  sevelamer carbonate 800 milliGRAM(s) Oral three times a day  sodium bicarbonate 1300 milliGRAM(s) Oral three times a day  sucralfate 1 Gram(s) Oral every 6 hours  thiamine 100 milliGRAM(s) Oral daily    PRN Inpatient Medications  bisacodyl 5 milliGRAM(s) Oral every 12 hours PRN  ondansetron Injectable 4 milliGRAM(s) IV Push every 6 hours PRN  oxyCODONE    IR 10 milliGRAM(s) Oral every 4 hours PRN  tiZANidine 2 milliGRAM(s) Oral every 8 hours PRN    REVIEW OF SYSTEMS  --------------------------------------------------------------------------------  Gen: No fevers   Respiratory: No dyspnea  CV: No chest pain  GI: No abdominal pain  : No dysuria  MSK: No  edema  Neuro: no dizziness   All other systems were reviewed and are negative, except as noted.    VITALS/PHYSICAL EXAM  --------------------------------------------------------------------------------  T(C): 36.6 (01-19-23 @ 10:17), Max: 37.2 (01-18-23 @ 16:45)  HR: 86 (01-19-23 @ 10:17) (86 - 101)  BP: 141/84 (01-19-23 @ 10:17) (136/71 - 143/64)  RR: 16 (01-19-23 @ 10:17) (16 - 20)  SpO2: 100% (01-19-23 @ 10:17) (98% - 100%)  Wt(kg): --    Physical Exam:  	Gen NAD  	HEENT: no JVD  	Pulm: CTABL  	CV: S1S2,  	Abd: Soft,   	Ext:   - edema B/L LE   	Neuro: Awake and alert  	Skin: Warm and dry              Vascular: IRMAE AVF + bruit    LABS/STUDIES  --------------------------------------------------------------------------------              8.0    11.65 >-----------<  261      [01-18-23 @ 06:40]              25.2     138  |  108  |  86  ----------------------------<  89      [01-18-23 @ 06:40]  3.8   |  16  |  5.63        Ca     7.7     [01-18-23 @ 06:40]      Mg     1.60     [01-18-23 @ 06:40]      Phos  4.5     [01-18-23 @ 06:40]    Creatinine Trend:  SCr 5.63 [01-18 @ 06:40]  SCr 5.72 [01-17 @ 06:44]  SCr 5.29 [01-16 @ 11:00]  SCr 4.69 [01-14 @ 05:43]  SCr 4.86 [01-13 @ 05:34]    Iron 95, TIBC <125, %sat --      [01-16-23 @ 11:00]  Ferritin 311      [01-03-23 @ 06:12]  PTH -- (Ca --)      [12-22-22 @ 06:40]   290  Vitamin D (25OH) 10.4      [01-04-23 @ 06:45]  HbA1c 5.0      [03-22-19 @ 09:03]  TSH 1.380      [09-01-22 @ 08:58]  Lipid: chol 147, TG 88, HDL 59, LDL --      [09-01-22 @ 08:58]

## 2023-01-19 NOTE — ADVANCED PRACTICE NURSE CONSULT - ASSESSMENT
General: Patient returned back from Xray, no diaper noted, as per primary RN patient refusing T&P, full wound assessment, application of offloading boots and dressing changes.     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 Fibrinous base unable to be removed while cleansing. Mild serous drainage. No odor. Periwound skin with hypo/hyperpigmentation, prominent sacral spine. Regular borders.  No induration, no erythema, no increased warmth.  Goals of care: Maintain moist wound bed, autolytic debridement, protect from friction and shear, continue to offload.     Perineum extending to gluteal fold and bilateral buttock with incontinence associated dermatitis with suspected candidiasis presenting with hyperpigmentation with darkened due and dry flaky skin at the periphery. Irregular borders. Hypopigmentation to left inner buttock.     Vascular: Bilateral lower extremities with scattered areas of hyper and hypopigmentation. Severe Dry, flaky skin to bilateral feet. Thickened fungal toenails. Right second toe avulsion.  No temperature changes noted. Edema to bilateral ankles. Left DP/PT -Faint palpable pulses, Right DP/PT unable to be palpated with monophasic doppler sounds to bilateral feet which is a change from previous assessment. Right foot Galdino in color from toes to midfoot. Dr Ruben Gonsales at bedside for assessment. Patient complaining of numbness and tingling to bilateral feet, pain upon light palpation.     Right second toe- with wound to dorsal tip exposing dried serous drainage. Periwound skin with hyperpigmentation and dry flaky skin. No s/s of infection. Goals of care:  protect periwound skin, monitor for tissue type changes.     Right third  toe with dry flaky skin. No maceration noted. Prior wound  healed. Dorsal tip 20% loosening from rest of toe.     Right heel- arterial wound complicated by pressure and frictional forces measuring 1tih1pam3.2cm exposing 100% pale pink agranular. (+) Bogginess. Pain upon palpation. No active drainage, no odor. No s/s of infection at this time. Goals of care: Continue to offload, protect from frictional forces, moist wound healing, protect periwound skin.     Left heel- Arterial wound complicated by pressure and frictional forces measuring 1.5cmx2.5cmx0.2cm exposing 100% pale pink agranular. Irregular  borders. Pain upon palpation. No s/s of infection. No active drainage. Goals of care: Continue to offload, protect from frictional forces, moist wound healing, protect periwound skin.

## 2023-01-19 NOTE — PROGRESS NOTE ADULT - ASSESSMENT
62M with bilateral foot wounds   - Patient seen and evaluated   - Afebrile, WBC 11.65  - Right IPJ hallux wound to subQ, fibrotic wound bed, right foot 2nd and 3rd digit medial DIPJ wound to bone, exposed distal phalanx to both digits, no malodor, scant serosanguinous drainage, necrotic periwound edges. Left foot heel early DTI with no signs of infection.   - Bilateral foot xray pending   - No wound culture necessary 2/2 to no acute signs of infection   - BARRY/PVRs pending  - Recommend right foot wound care with Iodosorb   - Discussed with patient the importance of having dressing to the right foot to prevent furthering infection, however patient refused at this time   - Recommend offloading bilateral heels with zflows   - Recommend vascular consult given non-palpable pulses   - Pod plan local wound care versus right foot partial 2nd and 3rd ray amputation pending imaging/vascular recs   - Please document medical optimization for possible right foot procedure under anesthesia   - Discussed with attending    62M with bilateral foot wounds   - Patient seen and evaluated   - Afebrile, WBC 11.65  - Right IPJ hallux wound to subQ, fibrotic wound bed, right foot 2nd and 3rd digit medial DIPJ wound to bone, exposed distal phalanx to both digits, no malodor, scant serosanguinous drainage, necrotic periwound edges. Left foot heel early DTI with no signs of infection.   - Bilateral foot xray: Bilateral soft tissue swelling. No tracking soft tissue gas collections, radiopaque foreign bodies, or gross radiographic evidence for osteomyelitis.  - No wound culture necessary 2/2 to no acute signs of infection   - BARRY/PVRs pending  - Recommend right foot wound care with Iodosorb   - Discussed with patient the importance of having dressing to the right foot to prevent furthering infection, however patient refused at this time   - Recommend offloading bilateral heels with zflows   - Recommend vascular consult given non-palpable pulses   - Pod plan local wound care pending vascular recs   - Discussed with attending

## 2023-01-19 NOTE — PROGRESS NOTE ADULT - PROBLEM SELECTOR PLAN 4
S/p HD X 3  in ICU in [facility], Cr plateaued at 5.7-6, off HD now; per renal plan for AVF creation on this admission as per d/w OP nephrologist baseline Cr is 3 and nearing ESRD  - [facility] renal duplex with severe stenosis of the left renal artery; vascular consulted, no plan for intervention for HESHAM  - sevelamer 800 mg TID & bicarb 650 mg TID;  calcitriol 0.25 for secondary hyperparathyroidism per renal  - s/p AVF w/ vascular on 1/5 to the L arm, L arm precautions  - avoid nephrotoxic  -renally dose meds  -trend Cr and electrolytes.  - discussed with nephrology patient is stable for d/c to [facility] from nephrology perspective. will d/c Bicitra as patient is not tolerating it.

## 2023-01-19 NOTE — PROGRESS NOTE ADULT - PROBLEM SELECTOR PLAN 1
Pt with YUNIEL on CKD likely multifactorial in the setting of recent COVID infection, poor oral intake. Scr was elevated at 2.20 in September 2022. Pt was recently admitted at [facility]. Scr on admission was significantly elevated at 8.94 on 11/25/22. Pt received 1st HD session on 11/26/22. Last HD session was done on 12/1/22. HD catheter was removed as kidney function was recovering. Scr was elevated at 5.28 on transfer from [facility] on 12/15/22. Scr remained elevated/stable at 5.40 on 1/5/23. Of note kidney sonogram had discrepant size renal artery duplex showing HESHAM but BP well controlled. Discussed with Dr. Dukes (12/21) who knows the patient very well and has seen him inpatient and outpatient over the last several years. As per Dr. Dukes the patient's baseline creatinine is 3 and has advanced CKD and is nearing ESKD. Pt underwent cardiac cath on 1/4/23 , tolerated procedure well. Underwent LUE AVF creation on 1/5/23. Last Scr is elevated/stable at 5.63 on 1/18/23. Pt non uremic, non oliguric. No urgent indication for HD. Monitor labs and urine output. Avoid any potential nephrotoxins. Dose medications as per eGFR.

## 2023-01-19 NOTE — PROGRESS NOTE ADULT - SUBJECTIVE AND OBJECTIVE BOX
SURGERY  Pager: #[phone]    INTERVAL EVENTS/SUBJECTIVE: Patient s/p first stage left brachiobasilic AVF on 1/5 with Dr. Oro. Vascular surgery reconsulted for evaluation in setting of bilateral foot wounds and reported nonpalpable pulses.     ______________________________________________  OBJECTIVE:   T(C): 36.6 (01-19-23 @ 10:17), Max: 37.2 (01-18-23 @ 16:45)  HR: 86 (01-19-23 @ 10:17) (86 - 101)  BP: 141/84 (01-19-23 @ 10:17) (136/71 - 143/64)  RR: 16 (01-19-23 @ 10:17) (16 - 20)  SpO2: 100% (01-19-23 @ 10:17) (98% - 100%)  Wt(kg): --  CAPILLARY BLOOD GLUCOSE      POCT Blood Glucose.: 130 mg/dL (19 Jan 2023 07:22)    I&O's Detail      Physical exam: incomplete, patient in vascular lab.   Gen: resting in bed comfortably in NAD  Chest: no increased WOB, regular inspiratory effort   Abdomen: Soft, nontender, nondistended with no rebound tenderness or guarding.   Vascular:   NEURO: awake, alert  ______________________________________________  LABS:  CBC Full  -  ( 18 Jan 2023 06:40 )  WBC Count : 11.65 K/uL  RBC Count : 2.75 M/uL  Hemoglobin : 8.0 g/dL  Hematocrit : 25.2 %  Platelet Count - Automated : 261 K/uL  Mean Cell Volume : 91.6 fL  Mean Cell Hemoglobin : 29.1 pg  Mean Cell Hemoglobin Concentration : 31.7 gm/dL  Auto Neutrophil # : x  Auto Lymphocyte # : x  Auto Monocyte # : x  Auto Eosinophil # : x  Auto Basophil # : x  Auto Neutrophil % : x  Auto Lymphocyte % : x  Auto Monocyte % : x  Auto Eosinophil % : x  Auto Basophil % : x    01-18    138  |  108<H>  |  86<H>  ----------------------------<  89  3.8   |  16<L>  |  5.63<H>    Ca    7.7<L>      18 Jan 2023 06:40  Phos  4.5     01-18  Mg     1.60     01-18      _____________________________________________  RADIOLOGY:     SURGERY  Pager: [phone]    INTERVAL EVENTS/SUBJECTIVE: Patient s/p first stage left brachiobasilic AVF on 1/5 with Dr. Oro. Vascular surgery reconsulted for evaluation in setting of bilateral foot wounds and reported nonpalpable pulses. Patient seen and examined at bedside, reporting pain with walking for 1 week.      ______________________________________________  OBJECTIVE:   T(C): 36.6 (01-19-23 @ 10:17), Max: 37.2 (01-18-23 @ 16:45)  HR: 86 (01-19-23 @ 10:17) (86 - 101)  BP: 141/84 (01-19-23 @ 10:17) (136/71 - 143/64)  RR: 16 (01-19-23 @ 10:17) (16 - 20)  SpO2: 100% (01-19-23 @ 10:17) (98% - 100%)  Wt(kg): --  CAPILLARY BLOOD GLUCOSE      POCT Blood Glucose.: 130 mg/dL (19 Jan 2023 07:22)    I&O's Detail      Physical exam:   Gen: resting in bed comfortably in NAD  Chest: no increased WOB, regular inspiratory effort   Abdomen: Soft, nontender, nondistended with no rebound tenderness or guarding.   Vascular: LLE with heel ulceration, no fluctuance or crepitus, palpable DP/PT. RLE with ulceration on 1st-3rd toes, faint R PT, no DP. Signals present in bilateral PT/DP, stronger on the left. Motor/sensation intact bilaterally.   Neuro: awake, alert  ______________________________________________  LABS:  CBC Full  -  ( 18 Jan 2023 06:40 )  WBC Count : 11.65 K/uL  RBC Count : 2.75 M/uL  Hemoglobin : 8.0 g/dL  Hematocrit : 25.2 %  Platelet Count - Automated : 261 K/uL  Mean Cell Volume : 91.6 fL  Mean Cell Hemoglobin : 29.1 pg  Mean Cell Hemoglobin Concentration : 31.7 gm/dL  Auto Neutrophil # : x  Auto Lymphocyte # : x  Auto Monocyte # : x  Auto Eosinophil # : x  Auto Basophil # : x  Auto Neutrophil % : x  Auto Lymphocyte % : x  Auto Monocyte % : x  Auto Eosinophil % : x  Auto Basophil % : x    01-18    138  |  108<H>  |  86<H>  ----------------------------<  89  3.8   |  16<L>  |  5.63<H>    Ca    7.7<L>      18 Jan 2023 06:40  Phos  4.5     01-18  Mg     1.60     01-18      _____________________________________________  RADIOLOGY:

## 2023-01-19 NOTE — PROGRESS NOTE ADULT - SUBJECTIVE AND OBJECTIVE BOX
[facility] Division of Hospital Medicine  Ruben Gonsales MD  In House Pager [phone]    Patient is a 62y old  Male who presents with a chief complaint of intractable N/V , esophageal stent removal (19 Jan 2023 11:01)    SUBJECTIVE / OVERNIGHT EVENTS: no acute events. patient was noted to have a chronic wound in his right toes. Per discussion with patient, He had this wound in the past, but never healed. He states it wasn't causing him any pain, given his neuropathy. Patient was evaluated by podiatry, and possibly need for resection pending w/u. patient agreeable to surgical intervention if warranted.     ROS: Denied Fever, Chill, CP, SOB, Abd pain, N/V/D, RLE wound.     MEDICATIONS  (STANDING):  albuterol/ipratropium for Nebulization. 3 milliLiter(s) Nebulizer once  cadexomer iodine 0.9% Gel 1 Application(s) Topical daily  calcitriol   Capsule 0.25 MICROGram(s) Oral daily  epoetin mejia-epbx (RETACRIT) Injectable 92628 Unit(s) SubCutaneous <User Schedule>  folic acid 1 milliGRAM(s) Oral daily  heparin   Injectable 5000 Unit(s) SubCutaneous every 8 hours  lidocaine   4% Patch 1 Patch Transdermal every 24 hours  metoclopramide 5 milliGRAM(s) Oral three times a day  multivitamin 1 Tablet(s) Oral daily  NIFEdipine XL 30 milliGRAM(s) Oral daily  pancrelipase  (CREON  6,000 Lipase Units) 1 Capsule(s) Oral three times a day with meals  pantoprazole    Tablet 40 milliGRAM(s) Oral every 12 hours  polyethylene glycol 3350 17 Gram(s) Oral two times a day  senna 2 Tablet(s) Oral at bedtime  sevelamer carbonate 800 milliGRAM(s) Oral three times a day  sodium bicarbonate 1300 milliGRAM(s) Oral three times a day  sucralfate 1 Gram(s) Oral every 6 hours  thiamine 100 milliGRAM(s) Oral daily    MEDICATIONS  (PRN):  bisacodyl 5 milliGRAM(s) Oral every 12 hours PRN Constipation  ondansetron Injectable 4 milliGRAM(s) IV Push every 6 hours PRN Nausea and/or Vomiting  oxyCODONE    IR 10 milliGRAM(s) Oral every 4 hours PRN Moderate Pain (4 - 6)-Severe pain (7-10)  tiZANidine 2 milliGRAM(s) Oral every 8 hours PRN Muscle Cramps    CAPILLARY BLOOD GLUCOSE      POCT Blood Glucose.: 130 mg/dL (19 Jan 2023 07:22)    I&O's Summary    PHYSICAL EXAM:  Vital Signs Last 24 Hrs  T(C): 36.6 (19 Jan 2023 10:17), Max: 37.2 (18 Jan 2023 16:45)  T(F): 97.8 (19 Jan 2023 10:17), Max: 98.9 (18 Jan 2023 16:45)  HR: 86 (19 Jan 2023 10:17) (86 - 101)  BP: 141/84 (19 Jan 2023 10:17) (136/71 - 143/64)  BP(mean): --  RR: 16 (19 Jan 2023 10:17) (16 - 20)  SpO2: 100% (19 Jan 2023 10:17) (98% - 100%)    Parameters below as of 19 Jan 2023 10:17  Patient On (Oxygen Delivery Method): room air      Gen: NAD; sitting in bed comfortably   Pulm: no accessory muscle use; lungs clear on auscultation bilaterally; no wheezing or crackles.   Cards: RRR, nl S1/S2; no LE edema; no JVD  Abd: non-distended; soft and NT on exam; +bs  Ext: DOMINIC; b/l feet with peeling dry skin, erythema area in b/l heels. extensive skin breakdown in R 2nd and 3rd toes. gauze wedge between toes with serosanguinous discharges.   Neuro: Awake and Alert; non-focal; moving all extremities.   Skin: no new rashes; warm to touch;     LABS:                        8.0    11.65 )-----------( 261      ( 18 Jan 2023 06:40 )             25.2     01-18    138  |  108<H>  |  86<H>  ----------------------------<  89  3.8   |  16<L>  |  5.63<H>    Ca    7.7<L>      18 Jan 2023 06:40  Phos  4.5     01-18  Mg     1.60     01-18                RADIOLOGY & ADDITIONAL TESTS:  Results Reviewed: Y  Imaging Personally Reviewed: Y  Electrocardiogram Personally Reviewed: Y    COORDINATION OF CARE:  Care Discussed with Consultants/Other Providers [Y/N]: Y  Prior or Outpatient Records Reviewed [Y/N]: Y

## 2023-01-19 NOTE — PROGRESS NOTE ADULT - PROBLEM SELECTOR PLAN 3
Pt. with anemia suspected in setting of renal failure. Received IV iron. Currently on Epo 10K three times a week. Blood transfusion per primary team. Monitor Hb .

## 2023-01-19 NOTE — ADVANCED PRACTICE NURSE CONSULT - REASON FOR CONSULT
Patient seen on skin care rounds after wound care referral received for reassessment of skin impairment and recommendations of topical management, Patient known to [facility] service line last seen on January 11th. Current chart reviewed: WBC 11.65, H/H 8.0/25.2, Mauri 16, colonoscopy (+) rectal ulcers without any active bleeding. Patient with persistent anemia s/p multiple transfusions. Since last admission patient seen by pain management for abdominal pain and podiatry for bilateral foot wounds possible right 2nd and 3rd toe amputation.

## 2023-01-19 NOTE — PROGRESS NOTE ADULT - ASSESSMENT
62M hx esophageal stricture, chronic pancreatitis, originally presented with n/v, esophageal stent removal in December, course c/b septic shock. Patient s/p first stage left brachiobasilic AVF on 1/5 with Dr. Oro. Vascular surgery reconsulted in setting of bilateral foot wounds and reported nonpalpable pulses.     PLAN:  - Please obtain BARRY/PVRs   - Final recommendations pending results    D/w vascular fellow on behalf of attending    C Team Surgery  #[id number]

## 2023-01-19 NOTE — PROGRESS NOTE ADULT - PROBLEM SELECTOR PLAN 4
Pt. with acidosis in setting of renal failure. Serum CO2 low at 16 today. Bicitra was discontinued as pt refuses to take. Continue with sodium bicarb tabs 1300 gm PO TID. Monitor SCO2    If you have any questions, please feel free to contact me  Jeff Pete  Nephrology Fellow  [phone]/ [employer] Teams(Preferred)  (After 5pm or on weekends please page the on-call fellow).

## 2023-01-20 NOTE — PROGRESS NOTE ADULT - SUBJECTIVE AND OBJECTIVE BOX
Patient is a 62y old  Male who presents with a chief complaint of intractable N/V , esophageal stent removal (20 Jan 2023 09:47)       INTERVAL HPI/OVERNIGHT EVENTS:  Patient seen and evaluated at bedside.  Pt is resting comfortable in NAD. Denies N/V/F/C.  Pain rated at X/10    Allergies    Tylenol (Other)    Intolerances        Vital Signs Last 24 Hrs  T(C): 36.7 (20 Jan 2023 09:19), Max: 37 (19 Jan 2023 21:30)  T(F): 98.1 (20 Jan 2023 09:19), Max: 98.6 (19 Jan 2023 21:30)  HR: 83 (20 Jan 2023 09:19) (83 - 99)  BP: 126/68 (20 Jan 2023 09:19) (124/43 - 144/68)  BP(mean): --  RR: 17 (20 Jan 2023 09:19) (16 - 17)  SpO2: 100% (20 Jan 2023 09:19) (100% - 100%)    Parameters below as of 20 Jan 2023 09:19  Patient On (Oxygen Delivery Method): room air        LABS:                        8.0    10.60 )-----------( 261      ( 20 Jan 2023 05:44 )             25.8     01-20    138  |  109<H>  |  85<H>  ----------------------------<  88  3.7   |  14<L>  |  5.74<H>    Ca    7.6<L>      20 Jan 2023 05:44  Phos  4.9     01-20  Mg     1.70     01-20      PT/INR - ( 20 Jan 2023 05:44 )   PT: 11.8 sec;   INR: 1.02 ratio         PTT - ( 20 Jan 2023 05:44 )  PTT:29.7 sec    CAPILLARY BLOOD GLUCOSE          Lower Extremity Physical Exam:    Vascular: DP/PT 0/4, B/L, CFT >3 seconds B/L, Temperature gradient warm to cool, B/L.   Neuro: Epicritic sensation intact to the level of digits, B/L.  Musculoskeletal/Ortho: unremarkable   Skin: Right IPJ hallux wound to subQ, fibrotic wound bed, right foot 2nd and 3rd digit medial DIPJ wound to bone, exposed distal phalanx to both digits, no malodor, scant serosanguinous drainage, necrotic periwound edges. Left foot heel early DTI with no signs of infection.       RADIOLOGY & ADDITIONAL TESTS:      Patient name: IMELDA ROBERTSON  Date of test: 1/19/2023  MR#: [id number]  [facility] #: [id number]    Location: [facility] Physician(s): , Ruben Gonsales MD  Interpreted by: Shilpa Newman M.D. [facility]  Tech: Dylan Lai SONG  Type of Test: LE Arterial: BARRY/Segm.  ------------------------------------------------------------------------  Procedure: Segmental Pressure/Waveform - complete  noninvasive physiologic studies of the bilateral lower  extremity arteries.  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)  ------------------------------------------------------------------------  PRESSURE (mm Hg):  ------------------------------------------------------------------------  RIGHT (BP):  Brachial: 134  High Thigh:  Low Thigh: 120  Calf: 106  Ankle-PT: 88  Ankle-DP: 87  Greate Toe: 40  BARRY: 0.66  ------------------------------------------------------------------------  LEFT (BP):  Brachial:  High Thigh:  Low Thigh: 137  Calf: 125  Ankle-PT: 102  Ankle-DP: 100  Greate Toe: 85  BARRY: 0.76  ------------------------------------------------------------------------  WAVEFORM:  ------------------------------------------------------------------------  RIGHT:  CFA:  Popliteal:  Ankle-PT:  Ankle-DP:  ------------------------------------------------------------------------  LEFT:  CFA:  Popliteal:  Ankle-PT:  Ankle-DP:  ------------------------------------------------------------------------  PSA/AVF:  ------------------------------------------------------------------------  RIGHT:  Pseudoaneurysm:  A-V fistula:  TBI: 0.30  ------------------------------------------------------------------------  LEFT:  Pseudoaneurysm:  A-V fistula:  TBI: 0.63  ------------------------------------------------------------------------  Right Findings: The  ankle-brachial index (BARRY) on the  right is moderately decreased (66).    The toe-brachial index (TBI) on the right is moderately  decreased (0.3). The right toe pressure is 40 mmHg.    Waveform and segmental pressure analyses suggest the  presence of small vessel arterial disease in the right  lower extremity.  Left Findings: The  ankle-brachial index (BARRY) on the left  is moderately decreased (0.76).  The toe-brachial index (TBI) on the left is mildly  decreased (0.63).  The left toe pressure is 85mmHg.  Waveform and segmental pressure analyses suggest the  presence of small vessel arterial disease in the left  lower extremity.  ------------------------------------------------------------------------  Summary/Impressions:  1.  Right  BARRY is moderately decreased (0.66).  Right TBI  is severely decreased (0.3). The right toe pressure is 40  mmHg.  Waveform and segmental pressure analyses suggest  the presence of small vessel  arterial disease in the  right lower extremity.  2.  Left resting BARRY is moderately decreased (0.76).  Left  TBI is mildly decreased (0.63). The left toe pressure is  85 mmHg.  Waveform and segmental pressure analyses suggest  the presence of small vessel arterial disease in the left  lower extremity.  ADDENDUM 1/20/2023: Bilateral small vessel arterial  disease  ------------------------------------------------------------------------  Revised on  1/20/2023 - 09:19:46 by ELI Meier  Confirmed on  1/20/2023 - 9:23 AM by ELI Meier  By signing this report, the attending physician certifies  that he or she has personally supervised and interpreted  the vascular study and has reviewed and or edited and  agrees with the written comments contained within the  report.

## 2023-01-20 NOTE — PROGRESS NOTE ADULT - ASSESSMENT
62M with bilateral foot wounds   - Patient seen and evaluated   - Afebrile, WBC 10.60  - Right IPJ hallux wound to subQ, fibrotic wound bed, right foot 2nd and 3rd digit medial DIPJ wound to bone, exposed distal phalanx to both digits, no malodor, scant serosanguinous drainage, necrotic periwound edges. Left foot heel early DTI with no signs of infection.   - Bilateral foot xray: Bilateral soft tissue swelling. No tracking soft tissue gas collections, radiopaque foreign bodies, or gross radiographic evidence for osteomyelitis.  - No wound culture necessary 2/2 to no acute signs of infection   - BARRY/PVRs reviewed   - Discussed with patient the importance of having dressing to the right foot to prevent furthering infection, however patient refused at this time   - Recommend offloading bilateral heels with zflows   - Pt scheduled for angiogram with vascular surgery today (appreciated)   - Pod plan local wound care pending vascular recs   - Seen with attending

## 2023-01-20 NOTE — PROGRESS NOTE ADULT - ASSESSMENT
62M EtoH misuse c/b chronic pancreatitis, HCV s/p treatment (SVR), emphysema, benign esophageal stricture (s/p stent on 4/2022) admitted at [facility] w/ septic shock septic shock due to pseudomonal PNA, lung abscess, Morganella bacteremia and Klebsiella UTI (now s/p 6 wks zosyn ended 1/6) w/ hospital course c/b YUNIEL on CKD requiring HD and MICU stay, transferred here for intractable nausea/vomiting and esophageal stent removal, now s/p removal w/ EGD sig for esophagitis/gastric ulcer. Hospital course now c/b acute on chronic anemia required multiple transfusion. Now patient is noted to have skin breakdown in Right toes concerning for underline infection secondary to vascular insufficiency.

## 2023-01-20 NOTE — PROGRESS NOTE ADULT - SUBJECTIVE AND OBJECTIVE BOX
[facility] DIVISION OF KIDNEY DISEASES AND HYPERTENSION -- FOLLOW UP NOTE  --------------------------------------------------------------------------------    PAST HISTORY  --------------------------------------------------------------------------------  No significant changes to PMH, PSH, FHx, SHx, unless otherwise noted    ALLERGIES & MEDICATIONS  --------------------------------------------------------------------------------  Allergies    Tylenol (Other)    Intolerances    Standing Inpatient Medications  albuterol/ipratropium for Nebulization. 3 milliLiter(s) Nebulizer once  cadexomer iodine 0.9% Gel 1 Application(s) Topical daily  calcitriol   Capsule 0.25 MICROGram(s) Oral daily  epoetin mejia-epbx (RETACRIT) Injectable 87381 Unit(s) SubCutaneous <User Schedule>  folic acid 1 milliGRAM(s) Oral daily  heparin   Injectable 5000 Unit(s) SubCutaneous every 8 hours  lidocaine   4% Patch 1 Patch Transdermal every 24 hours  magnesium oxide 400 milliGRAM(s) Oral three times a day with meals  metoclopramide 5 milliGRAM(s) Oral three times a day  multivitamin 1 Tablet(s) Oral daily  NIFEdipine XL 30 milliGRAM(s) Oral daily  pancrelipase  (CREON  6,000 Lipase Units) 1 Capsule(s) Oral three times a day with meals  pantoprazole    Tablet 40 milliGRAM(s) Oral every 12 hours  polyethylene glycol 3350 17 Gram(s) Oral two times a day  senna 2 Tablet(s) Oral at bedtime  sevelamer carbonate 800 milliGRAM(s) Oral three times a day  sodium bicarbonate 1300 milliGRAM(s) Oral three times a day  sodium chloride 0.9%. 1000 milliLiter(s) IV Continuous <Continuous>  sucralfate 1 Gram(s) Oral every 6 hours  thiamine 100 milliGRAM(s) Oral daily    PRN Inpatient Medications  bisacodyl 5 milliGRAM(s) Oral every 12 hours PRN  ondansetron Injectable 4 milliGRAM(s) IV Push every 6 hours PRN  oxyCODONE    IR 10 milliGRAM(s) Oral every 4 hours PRN  tiZANidine 2 milliGRAM(s) Oral every 8 hours PRN    REVIEW OF SYSTEMS  --------------------------------------------------------------------------------  Gen: No fevers   Respiratory: No dyspnea  CV: No chest pain  GI: No abdominal pain  : No dysuria  MSK: No  edema  Neuro: no dizziness   All other systems were reviewed and are negative, except as noted.    VITALS/PHYSICAL EXAM  --------------------------------------------------------------------------------  T(C): 36.7 (01-20-23 @ 09:19), Max: 37 (01-19-23 @ 21:30)  HR: 83 (01-20-23 @ 09:19) (83 - 99)  BP: 126/68 (01-20-23 @ 09:19) (124/43 - 144/68)  RR: 17 (01-20-23 @ 09:19) (16 - 17)  SpO2: 100% (01-20-23 @ 09:19) (100% - 100%)  Wt(kg): --  Height (cm): 182.9 (01-20-23 @ 07:16)  Weight (kg): 55.9 (01-20-23 @ 07:16)  BMI (kg/m2): 16.7 (01-20-23 @ 07:16)  BSA (m2): 1.73 (01-20-23 @ 07:16)    Physical Exam:  	Gen: NAD  	HEENT: MMM  	Pulm: CTA B/L  	CV: S1S2  	Abd: Soft, +BS   	Ext: No LE edema B/L  	Neuro: Awake  	Skin: Warm and dry  	Vascular access:    LABS/STUDIES  --------------------------------------------------------------------------------              8.0    10.60 >-----------<  261      [01-20-23 @ 05:44]              25.8     138  |  109  |  85  ----------------------------<  88      [01-20-23 @ 05:44]  3.7   |  14  |  5.74        Ca     7.6     [01-20-23 @ 05:44]      Mg     1.70     [01-20-23 @ 05:44]      Phos  4.9     [01-20-23 @ 05:44]      PT/INR: PT 11.8 , INR 1.02       [01-20-23 @ 05:44]  PTT: 29.7       [01-20-23 @ 05:44]      Creatinine Trend:  SCr 5.74 [01-20 @ 05:44]  SCr 5.71 [01-19 @ 07:00]  SCr 5.63 [01-18 @ 06:40]  SCr 5.72 [01-17 @ 06:44]  SCr 5.29 [01-16 @ 11:00]        Iron 95, TIBC <125, %sat --      [01-16-23 @ 11:00]  Ferritin 311      [01-03-23 @ 06:12]  PTH -- (Ca --)      [12-22-22 @ 06:40]   290  Vitamin D (25OH) 10.4      [01-04-23 @ 06:45]  HbA1c 5.0      [03-22-19 @ 09:03]  TSH 1.380      [09-01-22 @ 08:58]  Lipid: chol 147, TG 88, HDL 59, LDL --      [09-01-22 @ 08:58]       [facility] DIVISION OF KIDNEY DISEASES AND HYPERTENSION -- FOLLOW UP NOTE  --------------------------------------------------------------------------------  HPI: Pt is a 62-year-old Male with Hx of esophageal stricture (S/p stenting in 5/22), Emphysema, HCV, chronic pancreatitis who initially presented to [facility] on 11/25/22 for AMS. Pt was admitted to [facility] on 11/25 for AMS 2/2 Septic shock. Pt was initially intubated for acute respiratory failure and required pressors for hypotension. Pt's hospital course was complicated by YUNIEL/ATN requiring HD. Pt was extubated and was transferred to [facility] for esophageal stent removal by Dr. Erickson. Initial labs on this admission concerning for elevated Scr. Nephrology team following for YUNIEL on CKD.     On review of [facility], it was noted that Scr was elevated at 2.20 in September 2022. Scr on admission was significantly elevated at 8.94 on 11/25/22. Pt received 1st HD session on 11/26/22. Last HD session was done on 12/1/22. HD catheter was removed as kidney function was recovering. Scr was elevated at 5.28 on transfer from [facility] on 12/15/22. Scr remains elevated/stable at 5-6 range. Scr was elevated/stable at 5.19 on 1/7/23. Scr remain at 5.74 today.     Pt seen and examined this morning. Pt awake, reports feeling hungry. pt is NPO for LE angiogram by vascular today. Denies any chest pain, shortness of breath, or dizziness.    PAST HISTORY  --------------------------------------------------------------------------------  No significant changes to PMH, PSH, FHx, SHx, unless otherwise noted    ALLERGIES & MEDICATIONS  --------------------------------------------------------------------------------  Allergies    Tylenol (Other)    Intolerances    Standing Inpatient Medications  albuterol/ipratropium for Nebulization. 3 milliLiter(s) Nebulizer once  cadexomer iodine 0.9% Gel 1 Application(s) Topical daily  calcitriol   Capsule 0.25 MICROGram(s) Oral daily  epoetin mejia-epbx (RETACRIT) Injectable 03632 Unit(s) SubCutaneous <User Schedule>  folic acid 1 milliGRAM(s) Oral daily  heparin   Injectable 5000 Unit(s) SubCutaneous every 8 hours  lidocaine   4% Patch 1 Patch Transdermal every 24 hours  magnesium oxide 400 milliGRAM(s) Oral three times a day with meals  metoclopramide 5 milliGRAM(s) Oral three times a day  multivitamin 1 Tablet(s) Oral daily  NIFEdipine XL 30 milliGRAM(s) Oral daily  pancrelipase  (CREON  6,000 Lipase Units) 1 Capsule(s) Oral three times a day with meals  pantoprazole    Tablet 40 milliGRAM(s) Oral every 12 hours  polyethylene glycol 3350 17 Gram(s) Oral two times a day  senna 2 Tablet(s) Oral at bedtime  sevelamer carbonate 800 milliGRAM(s) Oral three times a day  sodium bicarbonate 1300 milliGRAM(s) Oral three times a day  sodium chloride 0.9%. 1000 milliLiter(s) IV Continuous <Continuous>  sucralfate 1 Gram(s) Oral every 6 hours  thiamine 100 milliGRAM(s) Oral daily    PRN Inpatient Medications  bisacodyl 5 milliGRAM(s) Oral every 12 hours PRN  ondansetron Injectable 4 milliGRAM(s) IV Push every 6 hours PRN  oxyCODONE    IR 10 milliGRAM(s) Oral every 4 hours PRN  tiZANidine 2 milliGRAM(s) Oral every 8 hours PRN    REVIEW OF SYSTEMS  --------------------------------------------------------------------------------  Gen: No fevers   Respiratory: No dyspnea  CV: No chest pain  GI: No abdominal pain  : No dysuria  MSK: No  edema  Neuro: no dizziness   All other systems were reviewed and are negative, except as noted.    VITALS/PHYSICAL EXAM  --------------------------------------------------------------------------------  T(C): 36.7 (01-20-23 @ 09:19), Max: 37 (01-19-23 @ 21:30)  HR: 83 (01-20-23 @ 09:19) (83 - 99)  BP: 126/68 (01-20-23 @ 09:19) (124/43 - 144/68)  RR: 17 (01-20-23 @ 09:19) (16 - 17)  SpO2: 100% (01-20-23 @ 09:19) (100% - 100%)  Wt(kg): --  Height (cm): 182.9 (01-20-23 @ 07:16)  Weight (kg): 55.9 (01-20-23 @ 07:16)  BMI (kg/m2): 16.7 (01-20-23 @ 07:16)  BSA (m2): 1.73 (01-20-23 @ 07:16)    Physical Exam:  	Gen NAD  	HEENT: no JVD  	Pulm: CTABL  	CV: S1S2,  	Abd: Soft,   	Ext:   - edema B/L LE, B/L foot wound evident   	Neuro: Awake and alert  	Skin: Warm and dry              Vascular: LUE AVF + bruit    LABS/STUDIES  --------------------------------------------------------------------------------              8.0    10.60 >-----------<  261      [01-20-23 @ 05:44]              25.8     138  |  109  |  85  ----------------------------<  88      [01-20-23 @ 05:44]  3.7   |  14  |  5.74        Ca     7.6     [01-20-23 @ 05:44]      Mg     1.70     [01-20-23 @ 05:44]      Phos  4.9     [01-20-23 @ 05:44]    PT/INR: PT 11.8 , INR 1.02       [01-20-23 @ 05:44]  PTT: 29.7       [01-20-23 @ 05:44]    Creatinine Trend:  SCr 5.74 [01-20 @ 05:44]  SCr 5.71 [01-19 @ 07:00]  SCr 5.63 [01-18 @ 06:40]  SCr 5.72 [01-17 @ 06:44]  SCr 5.29 [01-16 @ 11:00]    Iron 95, TIBC <125, %sat --      [01-16-23 @ 11:00]  Ferritin 311      [01-03-23 @ 06:12]  PTH -- (Ca --)      [12-22-22 @ 06:40]   290  Vitamin D (25OH) 10.4      [01-04-23 @ 06:45]  HbA1c 5.0      [03-22-19 @ 09:03]  TSH 1.380      [09-01-22 @ 08:58]  Lipid: chol 147, TG 88, HDL 59, LDL --      [09-01-22 @ 08:58]

## 2023-01-20 NOTE — PROGRESS NOTE ADULT - PROBLEM SELECTOR PLAN 1
b/l heel wounds and right 2nd and 3rd toe wound.   Podiatry concern for deep infection and possible need for amputation.   wound was seen with wound care at bedside, discussed care and per their evaluation, this is slight improved from last evaluation 1 week prior.   BARRY/PVR result reviewed; mild stenosis in LLE, moderate stenosis in RLE.   Xray foot negative for signs of OM.   vascular plan for angiogram LE on 1/20. f/u report.   Podiatry plan for local wound care.

## 2023-01-20 NOTE — PROGRESS NOTE ADULT - SUBJECTIVE AND OBJECTIVE BOX
POST-OPERATIVE NOTE    Patient is s/p b/l angiogram with Left PT plasty with Dr. Oro    Subjective:  Patient reports pain at the incisional site, controlled with medication  Denies chest pain, shortness of breath, nausea, vomiting    Vital Signs Last 24 Hrs  T(C): 36.6 (20 Jan 2023 17:45), Max: 36.9 (20 Jan 2023 05:45)  T(F): 97.9 (20 Jan 2023 17:45), Max: 98.4 (20 Jan 2023 05:45)  HR: 89 (20 Jan 2023 17:45) (77 - 90)  BP: 144/79 (20 Jan 2023 17:45) (118/62 - 144/79)  BP(mean): 72 (20 Jan 2023 16:45) (71 - 79)  RR: 17 (20 Jan 2023 17:45) (9 - 18)  SpO2: 99% (20 Jan 2023 17:45) (99% - 100%)    Parameters below as of 20 Jan 2023 17:45  Patient On (Oxygen Delivery Method): room air    I&O's Detail    20 Jan 2023 07:01  -  20 Jan 2023 23:03  --------------------------------------------------------  IN:    Oral Fluid: 415 mL    sodium chloride 0.9%: 540 mL  Total IN: 955 mL    OUT:    Blood Loss (mL): 0 mL    IV PiggyBack: 0 mL    Voided (mL): 1000 mL  Total OUT: 1000 mL    Total NET: -45 mL    Physical Exam:  General: NAD, resting comfortably in bed  Pulmonary: Nonlabored breathing, no respiratory distress  Abdominal: soft, appropriately tender, nondistended, L groin dressing not soiled  Extremities: WWP; white bandages on b/l lower extremities not soiled      LABS:                        8.0    10.60 )-----------( 261      ( 20 Jan 2023 05:44 )             25.8     01-20    138  |  109<H>  |  85<H>  ----------------------------<  88  3.7   |  14<L>  |  5.74<H>    Ca    7.6<L>      20 Jan 2023 05:44  Phos  4.9     01-20  Mg     1.70     01-20      PT/INR - ( 20 Jan 2023 05:44 )   PT: 11.8 sec;   INR: 1.02 ratio         PTT - ( 20 Jan 2023 05:44 )  PTT:29.7 sec      Assessment:  The patient is a 62y Male who is now several hours post-op from a b/l angiogram with Left PT plasty. Recovering appropriately.    Plan:  - Pain control as needed  - tolerating regular diet  - heparin ppx  - OOB and ambulating as tolerated  - F/u AM labs

## 2023-01-20 NOTE — PROGRESS NOTE ADULT - ATTENDING COMMENTS
YUNIEL on CKD  CKD V    Angiogram planned for today, monitor renal function post procedure as he may require HD  Monitor Is/Os and daily weights

## 2023-01-20 NOTE — PRE-OP CHECKLIST - 3.
Left arm AV fistula - Positive thrill - finger warm to touch - good capillary refill /// LEFT arm Pracuations - pink band in place Left arm AV fistula - Positive thrill - finger warm to touch - good capillary refill /// LEFT arm Precautions - pink band in place

## 2023-01-20 NOTE — PROGRESS NOTE ADULT - PROBLEM SELECTOR PLAN 1
Pt with YUNIEL on CKD likely multifactorial in the setting of recent COVID infection, poor oral intake. Scr was elevated at 2.20 in September 2022. Pt was recently admitted at [facility]. Scr on admission was significantly elevated at 8.94 on 11/25/22. Pt received 1st HD session on 11/26/22. Last HD session was done on 12/1/22. HD catheter was removed as kidney function was recovering. Scr was elevated at 5.28 on transfer from [facility] on 12/15/22. Scr remained elevated/stable at 5.40 on 1/5/23. Of note kidney sonogram had discrepant size renal artery duplex showing HESHAM but BP well controlled. Discussed with Dr. Dukes (12/21) who knows the patient very well and has seen him inpatient and outpatient over the last several years. As per Dr. Dukes the patient's baseline creatinine is 3 and has advanced CKD and is nearing ESKD. Pt underwent cardiac cath on 1/4/23 , tolerated procedure well. Underwent LUE AVF creation on 1/5/23. Last Scr is elevated/stable at 5.74 today (1/20/23). Pt non uremic, non oliguric. No urgent indication for HD. Monitor labs and urine output. Avoid any potential nephrotoxins. Dose medications as per eGFR. Pt with YUNIEL on CKD likely multifactorial in the setting of recent COVID infection, poor oral intake. Scr was elevated at 2.20 in September 2022. Pt was recently admitted at [facility]. Scr on admission was significantly elevated at 8.94 on 11/25/22. Pt received 1st HD session on 11/26/22. Last HD session was done on 12/1/22. HD catheter was removed as kidney function was recovering. Scr was elevated at 5.28 on transfer from [facility] on 12/15/22. Scr remained elevated/stable at 5.40 on 1/5/23. Of note kidney sonogram had discrepant size renal artery duplex showing HESHAM but BP well controlled. Discussed with Dr. Dukes (12/21) who knows the patient very well and has seen him inpatient and outpatient over the last several years. As per Dr. Dukes the patient's baseline creatinine is 3 and has advanced CKD and is nearing ESKD. Pt underwent cardiac cath on 1/4/23 , tolerated procedure well. Underwent LUE AVF creation on 1/5/23. Last Scr is elevated/stable at 5.74 today (1/20/23). Pt non uremic, non oliguric. No urgent indication for HD. Pt receiving gentle hydration today for LE angiogram by vascular. Agree with gentle hydration. Monitor labs and urine output. Avoid any potential nephrotoxins. Dose medications as per eGFR. Pt with YUNIEL on CKD likely multifactorial in the setting of recent COVID infection, poor oral intake. Scr was elevated at 2.20 in September 2022. Pt was recently admitted at [facility]. Scr on admission was significantly elevated at 8.94 on 11/25/22. Pt received 1st HD session on 11/26/22. Last HD session was done on 12/1/22. HD catheter was removed as kidney function was recovering. Scr was elevated at 5.28 on transfer from [facility] on 12/15/22. Scr remained elevated/stable at 5.40 on 1/5/23. Of note kidney sonogram had discrepant size renal artery duplex showing HESHAM but BP well controlled. Discussed with Dr. Dukes (12/21) who knows the patient very well and has seen him inpatient and outpatient over the last several years. As per Dr. Dukes the patient's baseline creatinine is 3 and has advanced CKD and is nearing ESKD. Pt underwent cardiac cath on 1/4/23 , tolerated procedure well. Underwent LUE AVF creation on 1/5/23. Last Scr is elevated/stable at 5.74 today (1/20/23). Pt non uremic, non oliguric. No urgent indication for HD. Pt receiving gentle hydration today for LE angiogram by vascular. Agree with gentle hydration. Will asses post LE angiogram for dialysis. Monitor labs and urine output. Avoid any potential nephrotoxins. Dose medications as per eGFR.

## 2023-01-20 NOTE — PROGRESS NOTE ADULT - PROBLEM SELECTOR PLAN 4
S/p HD X 3  in ICU in [facility], Cr plateaued at 5.7-6, off HD now; per renal plan for AVF creation on this admission as per d/w OP nephrologist baseline Cr is 3 and nearing ESRD  - [facility] renal duplex with severe stenosis of the left renal artery; vascular consulted, no plan for intervention for HESHAM  - sevelamer 800 mg TID & bicarb 650 mg TID;  calcitriol 0.25 for secondary hyperparathyroidism per renal  - s/p AVF w/ vascular on 1/5 to the L arm, L arm precautions  - avoid nephrotoxic  -renally dose meds  -trend Cr and electrolytes.  - discussed with nephrology patient is stable for d/c to [facility] from nephrology perspective. will d/c Bicitra as patient is not tolerating it.

## 2023-01-20 NOTE — ED ADULT NURSE NOTE - CADM POA CENTRAL LINE
[FreeTextEntry1] : 52 year old female with HTN, HLD, malignant neoplasm of left breast on chemotherapy here for initial visit for discussion of labs and evaluation of renal function. \par \par She received TCHP (carboplatin based) x 2 cycles w/ decrease in egFR from 100-->60-->40-->46. \par Her 3rd cycle was subsequently cancelled and she is now a switch to ACTHP. \par \par Patient also on ramipril for HTN.\par \par \par 1. Acute kidney injury \par -Most likely secondary to acute tubular and interstitial injury related to platin based chemotherapy\par -Patient also on ACEI\par -Will check labs as detailed below\par -If GFR has not improved further on labs tomorrow then will discontinue ramipril for now in setting of ASPEN\par -She is due for a f/u ECHO\par \par 2.Hypertension\par -If ramipril is to be discontinued (see #1 above) then will change it to an alternative likely amlodipine\par \par 3. Breast Ca: change in chemotherapy as mentioned in HPI above.\par \par F/u in 2 weeks\par  No

## 2023-01-20 NOTE — PROGRESS NOTE ADULT - SUBJECTIVE AND OBJECTIVE BOX
[facility] Division of Hospital Medicine  Ruben Gonsales MD  In House Pager [phone]    Patient is a 62y old  Male who presents with a chief complaint of intractable N/V , esophageal stent removal (20 Jan 2023 09:59)    SUBJECTIVE / OVERNIGHT EVENTS: no acute events. patient has no complaints today. On schedule for LE angiogram by vascular surgery today. patient aware.    ROS: Denied Fever, Chill, CP, SOB, Abd pain, N/V/D, LE swelling or pain.     MEDICATIONS  (STANDING):  albuterol/ipratropium for Nebulization. 3 milliLiter(s) Nebulizer once  cadexomer iodine 0.9% Gel 1 Application(s) Topical daily  calcitriol   Capsule 0.25 MICROGram(s) Oral daily  epoetin mejia-epbx (RETACRIT) Injectable 27353 Unit(s) SubCutaneous <User Schedule>  folic acid 1 milliGRAM(s) Oral daily  heparin   Injectable 5000 Unit(s) SubCutaneous every 8 hours  lidocaine   4% Patch 1 Patch Transdermal every 24 hours  magnesium oxide 400 milliGRAM(s) Oral three times a day with meals  metoclopramide 5 milliGRAM(s) Oral three times a day  multivitamin 1 Tablet(s) Oral daily  NIFEdipine XL 30 milliGRAM(s) Oral daily  pancrelipase  (CREON  6,000 Lipase Units) 1 Capsule(s) Oral three times a day with meals  pantoprazole    Tablet 40 milliGRAM(s) Oral every 12 hours  polyethylene glycol 3350 17 Gram(s) Oral two times a day  senna 2 Tablet(s) Oral at bedtime  sevelamer carbonate 800 milliGRAM(s) Oral three times a day  sodium bicarbonate 1300 milliGRAM(s) Oral three times a day  sodium chloride 0.9%. 1000 milliLiter(s) (60 mL/Hr) IV Continuous <Continuous>  sucralfate 1 Gram(s) Oral every 6 hours  thiamine 100 milliGRAM(s) Oral daily    MEDICATIONS  (PRN):  bisacodyl 5 milliGRAM(s) Oral every 12 hours PRN Constipation  ondansetron Injectable 4 milliGRAM(s) IV Push every 6 hours PRN Nausea and/or Vomiting  oxyCODONE    IR 10 milliGRAM(s) Oral every 4 hours PRN Moderate Pain (4 - 6)-Severe pain (7-10)  tiZANidine 2 milliGRAM(s) Oral every 8 hours PRN Muscle Cramps    CAPILLARY BLOOD GLUCOSE        I&O's Summary    20 Jan 2023 07:01  -  20 Jan 2023 11:14  --------------------------------------------------------  IN: 120 mL / OUT: 575 mL / NET: -455 mL      PHYSICAL EXAM:  Vital Signs Last 24 Hrs  T(C): 36.2 (20 Jan 2023 10:00), Max: 37 (19 Jan 2023 21:30)  T(F): 97.2 (20 Jan 2023 10:00), Max: 98.6 (19 Jan 2023 21:30)  HR: 77 (20 Jan 2023 10:00) (77 - 99)  BP: 120/61 (20 Jan 2023 10:00) (120/61 - 144/68)  BP(mean): --  RR: 16 (20 Jan 2023 10:00) (16 - 17)  SpO2: 100% (20 Jan 2023 10:00) (100% - 100%)    Parameters below as of 20 Jan 2023 10:00  Patient On (Oxygen Delivery Method): room air      Gen: NAD; sitting in bed comfortably   Pulm: no accessory muscle use; lungs clear on auscultation bilaterally; no wheezing or crackles.   Cards: RRR, nl S1/S2; no LE edema; no JVD  Abd: non-distended; soft and NT on exam; +bs  Ext: DOMINIC; b/l feet with peeling dry skin, erythema area in b/l heels. extensive skin breakdown in R 2nd and 3rd toes. gauze wedge between toes with serosanguinous discharges.   Neuro: Awake and Alert; non-focal; moving all extremities.   Skin: no new rashes; warm to touch;     LABS:                        8.0    10.60 )-----------( 261      ( 20 Jan 2023 05:44 )             25.8     01-20    138  |  109<H>  |  85<H>  ----------------------------<  88  3.7   |  14<L>  |  5.74<H>    Ca    7.6<L>      20 Jan 2023 05:44  Phos  4.9     01-20  Mg     1.70     01-20      PT/INR - ( 20 Jan 2023 05:44 )   PT: 11.8 sec;   INR: 1.02 ratio         PTT - ( 20 Jan 2023 05:44 )  PTT:29.7 sec          RADIOLOGY & ADDITIONAL TESTS:  Results Reviewed: Y Xray of foot: negative for OM. BARRY: moderate stenosis of RLE.   Imaging Personally Reviewed: Y  Electrocardiogram Personally Reviewed: Y    COORDINATION OF CARE:  Care Discussed with Consultants/Other Providers [Y/N]: Y  Prior or Outpatient Records Reviewed [Y/N]: Y

## 2023-01-20 NOTE — PROGRESS NOTE ADULT - ASSESSMENT
62M hx esophageal stricture, chronic pancreatitis, originally presented with n/v, esophageal stent removal in December, course c/b septic shock. Patient s/p first stage left brachiobasilic AVF on 1/5 with Dr. Oro. Vascular surgery reconsulted in setting of bilateral foot wounds and reported nonpalpable pulses.     PLAN:  - For angiogram today  - NPO  - consent in chart    C Team Surgery  #[id number]

## 2023-01-20 NOTE — PROGRESS NOTE ADULT - PROBLEM SELECTOR PLAN 4
Pt. with acidosis in setting of renal failure. Serum CO2 low at 14 today. Bicitra was discontinued as pt refuses to take. Continue with sodium bicarb tabs 1300 gm PO TID. Monitor SCO2    If you have any questions, please feel free to contact me  Jeff Pete  Nephrology Fellow  [phone]/ [employer] Teams(Preferred)  (After 5pm or on weekends please page the on-call fellow)

## 2023-01-20 NOTE — PROGRESS NOTE ADULT - PROBLEM SELECTOR PLAN 2
Secondary hyperparathyroidism. Ionized calcium was low. Increase calcitriol to 0.5mcg daily. Continue Sevelamer 800mg mg TID with meals. Monitor phos levels

## 2023-01-20 NOTE — PROGRESS NOTE ADULT - SUBJECTIVE AND OBJECTIVE BOX
GENERAL SURGERY PROGRESS NOTE    pain around fistula and lower extremity wounds    10-point review of systems completed and negative except as noted above.      OBJECTIVE    MEDICATIONS  albuterol/ipratropium for Nebulization. 3 milliLiter(s) Nebulizer once  bisacodyl 5 milliGRAM(s) Oral every 12 hours PRN  cadexomer iodine 0.9% Gel 1 Application(s) Topical daily  calcitriol   Capsule 0.25 MICROGram(s) Oral daily  epoetin mejia-epbx (RETACRIT) Injectable 23717 Unit(s) SubCutaneous <User Schedule>  folic acid 1 milliGRAM(s) Oral daily  heparin   Injectable 5000 Unit(s) SubCutaneous every 8 hours  lidocaine   4% Patch 1 Patch Transdermal every 24 hours  magnesium oxide 400 milliGRAM(s) Oral three times a day with meals  metoclopramide 5 milliGRAM(s) Oral three times a day  multivitamin 1 Tablet(s) Oral daily  NIFEdipine XL 30 milliGRAM(s) Oral daily  ondansetron Injectable 4 milliGRAM(s) IV Push every 6 hours PRN  oxyCODONE    IR 10 milliGRAM(s) Oral every 4 hours PRN  pancrelipase  (CREON  6,000 Lipase Units) 1 Capsule(s) Oral three times a day with meals  pantoprazole    Tablet 40 milliGRAM(s) Oral every 12 hours  polyethylene glycol 3350 17 Gram(s) Oral two times a day  senna 2 Tablet(s) Oral at bedtime  sevelamer carbonate 800 milliGRAM(s) Oral three times a day  sodium bicarbonate 1300 milliGRAM(s) Oral three times a day  sodium chloride 0.9%. 1000 milliLiter(s) IV Continuous <Continuous>  sucralfate 1 Gram(s) Oral every 6 hours  thiamine 100 milliGRAM(s) Oral daily  tiZANidine 2 milliGRAM(s) Oral every 8 hours PRN      PHYSICAL EXAM  T(C): 36.9 (01-20-23 @ 05:45), Max: 37 (01-19-23 @ 21:30)  HR: 84 (01-20-23 @ 05:45) (84 - 99)  BP: 144/68 (01-20-23 @ 05:45) (124/43 - 144/68)  RR: 17 (01-20-23 @ 05:45) (16 - 17)  SpO2: 100% (01-20-23 @ 05:45) (100% - 100%)      General: Appears well, NAD  Neuro: AAOx3  CHEST: Clear to auscultation bilaterally  CV: Regular rate and rhythm  Abdomen: soft, nontender, nondistended, no rebound or guarding  Extremities: Grossly symmetric, b/l leg wounds, + thrill over left AVF    LABS                        8.0    10.60 )-----------( 261      ( 20 Jan 2023 05:44 )             25.8     01-19    138  |  110<H>  |  85<H>  ----------------------------<  72  3.9   |  12<L>  |  5.71<H>    Ca    7.4<L>      19 Jan 2023 07:00  Phos  4.0     01-19  Mg     1.60     01-19      PT/INR - ( 20 Jan 2023 05:44 )   PT: 11.8 sec;   INR: 1.02 ratio         PTT - ( 20 Jan 2023 05:44 )  PTT:29.7 sec      RADIOLOGY & ADDITIONAL STUDIES

## 2023-01-21 NOTE — PROGRESS NOTE ADULT - PROBLEM SELECTOR PLAN 2
Diff inclu  GIB iso PUD and medication non-adherence (intermittently refusing PPI/ Carafate) +/- anemia of chronic disease. Work up not consistent w/ hemolysis   -CT Abd pelvis w/o RP bleed  -Previous EGD 12/16/22 w/ esophagitis  -Repeat EGD 1/13 w/o active bleeding  -Colonoscopy 1/13 w/o active bleeding. W/ mucosal ulceration. Biopsied  - Bx-Colonic mucosa with active chronic colitis  -s/p 4 units PRBC on this admission  -c/w PPI BID  -C/w Carafate 1G Q6H  -Outpatient GI follow up w/ repeat upper endoscopy in 6-8 weeks and colonoscopy

## 2023-01-21 NOTE — PROGRESS NOTE ADULT - PROBLEM SELECTOR PLAN 12
BP slightly elevated, intermittently refusing BP meds   - renal dopplers show left sided HESHAM 60-99%, vascular not recommending intervention   - c/w Nifedipine 30mg QD      # DVT PPx  - Heparin SQ  PT --> [facility], pending insurance authorization

## 2023-01-21 NOTE — PROGRESS NOTE ADULT - SUBJECTIVE AND OBJECTIVE BOX
Patient is a 62y old  Male who presents with a chief complaint of intractable N/V , esophageal stent removal (21 Jan 2023 09:18)      INTERVAL HPI/OVERNIGHT EVENTS:  Seen by me this afternoon, sitting in chair, wants his diet to be changed as he wants soup. Denies pain.  S/p RLE angiogram by vascular yesterday with PT plasty    Review of Systems: 12 point review of systems otherwise negative    MEDICATIONS  (STANDING):  albuterol/ipratropium for Nebulization. 3 milliLiter(s) Nebulizer once  cadexomer iodine 0.9% Gel 1 Application(s) Topical daily  calcitriol   Capsule 0.25 MICROGram(s) Oral daily  epoetin mejia-epbx (RETACRIT) Injectable 04015 Unit(s) SubCutaneous <User Schedule>  folic acid 1 milliGRAM(s) Oral daily  heparin   Injectable 5000 Unit(s) SubCutaneous every 8 hours  metoclopramide 5 milliGRAM(s) Oral three times a day  multivitamin 1 Tablet(s) Oral daily  NIFEdipine XL 30 milliGRAM(s) Oral daily  pancrelipase  (CREON  6,000 Lipase Units) 1 Capsule(s) Oral three times a day with meals  pantoprazole    Tablet 40 milliGRAM(s) Oral every 12 hours  polyethylene glycol 3350 17 Gram(s) Oral two times a day  senna 2 Tablet(s) Oral at bedtime  sevelamer carbonate 800 milliGRAM(s) Oral three times a day  sodium bicarbonate 1300 milliGRAM(s) Oral three times a day  sodium chloride 0.9%. 1000 milliLiter(s) (60 mL/Hr) IV Continuous <Continuous>  sucralfate 1 Gram(s) Oral every 6 hours  thiamine 100 milliGRAM(s) Oral daily    MEDICATIONS  (PRN):  bisacodyl 5 milliGRAM(s) Oral every 12 hours PRN Constipation  ondansetron Injectable 4 milliGRAM(s) IV Push every 6 hours PRN Nausea and/or Vomiting  oxyCODONE    IR 5 milliGRAM(s) Oral once PRN Breakthrough  oxyCODONE    IR 10 milliGRAM(s) Oral every 4 hours PRN Moderate Pain (4 - 6)-Severe pain (7-10)  tiZANidine 2 milliGRAM(s) Oral every 8 hours PRN Muscle Cramps      Allergies    Tylenol (Other)    Intolerances          Vital Signs Last 24 Hrs  T(C): 36.7 (21 Jan 2023 14:30), Max: 36.8 (21 Jan 2023 06:21)  T(F): 98 (21 Jan 2023 14:30), Max: 98.2 (21 Jan 2023 06:21)  HR: 81 (21 Jan 2023 14:30) (81 - 89)  BP: 128/72 (21 Jan 2023 14:30) (128/72 - 144/79)  BP(mean): --  RR: 17 (21 Jan 2023 14:30) (17 - 19)  SpO2: 99% (21 Jan 2023 14:30) (99% - 100%)    Parameters below as of 21 Jan 2023 14:30  Patient On (Oxygen Delivery Method): room air      CAPILLARY BLOOD GLUCOSE          01-20 @ 07:01  -  01-21 @ 07:00  --------------------------------------------------------  IN: 1915 mL / OUT: 1000 mL / NET: 915 mL        Physical Exam:    Daily     Daily   General:  Well appearing, NAD, cachetic  HEENT:  Nonicteric, PERRLA  CV:  RRR, no murmur, no JVD  Lungs:  CTA B/L, no wheezes, rales, rhonchi  Abdomen:  Soft, non-tender, no distended, positive BS  Extremities:  2+ pulses, no c/c, B/L LE covered with dressings  Skin:  Warm and dry, no rashes  :  No chen  Neuro:  AAOx3, non-focal, CN II-XII grossly intact  No Restraints    LABS:                        7.5    10.53 )-----------( 196      ( 21 Jan 2023 07:00 )             23.9     01-21    140  |  112<H>  |  90<H>  ----------------------------<  139<H>  3.8   |  11<L>  |  5.84<H>    Ca    7.5<L>      21 Jan 2023 07:00  Phos  6.0     01-21  Mg     1.90     01-21      PT/INR - ( 20 Jan 2023 05:44 )   PT: 11.8 sec;   INR: 1.02 ratio         PTT - ( 20 Jan 2023 05:44 )  PTT:29.7 sec        RADIOLOGY & ADDITIONAL TESTS:  Reviewed by me

## 2023-01-21 NOTE — PROGRESS NOTE ADULT - PROBLEM SELECTOR PLAN 1
b/l heel wounds and right 2nd and 3rd toe wound.   Podiatry concern for deep infection and possible need for amputation.   wound was seen with wound care at bedside, discussed care and per their evaluation, this is slight improved from last evaluation 1 week prior.   BARRY/PVR result reviewed; mild stenosis in LLE, moderate stenosis in RLE.   Xray foot negative for signs of OM  C/w local wound care by Podiatry  S/p RLE angio by vascular on 1/20 with PT plasty, possible need for pop-PT bypass  B/L LE vein mapping-No evidence of thrombosis or significant venous wall thickening noted within the right and left great saphenous and short saphenous veins  - Cards to weigh in for optimization

## 2023-01-21 NOTE — PROGRESS NOTE ADULT - SUBJECTIVE AND OBJECTIVE BOX
SURGERY  Pager: #[phone]    INTERVAL EVENTS/SUBJECTIVE: No acute events overnight. s/p b/l angiogram with Left PT plasty with Dr. Oro. Seen and examined this AM.     ______________________________________________  OBJECTIVE:   T(C): 36.8 (01-21-23 @ 06:21), Max: 36.8 (01-21-23 @ 06:21)  HR: 88 (01-21-23 @ 06:21) (77 - 90)  BP: 130/74 (01-21-23 @ 06:21) (118/62 - 144/79)  RR: 19 (01-21-23 @ 06:21) (9 - 19)  SpO2: 100% (01-21-23 @ 06:21) (99% - 100%)  Wt(kg): --  CAPILLARY BLOOD GLUCOSE        I&O's Detail    20 Jan 2023 07:01  -  21 Jan 2023 07:00  --------------------------------------------------------  IN:    Oral Fluid: 895 mL    sodium chloride 0.9%: 1020 mL  Total IN: 1915 mL    OUT:    Blood Loss (mL): 0 mL    IV PiggyBack: 0 mL    Voided (mL): 1000 mL  Total OUT: 1000 mL    Total NET: 915 mL          Physical exam:  Gen: resting in bed comfortably in NAD  Chest: no increased WOB, regular inspiratory effort   Abdomen: Soft, nontender, nondistended with no rebound tenderness or guarding.   Vascular: L groin soft and flat, LE +DP/PT signals   Neuro: awake, alert  ______________________________________________  LABS:  CBC Full  -  ( 21 Jan 2023 07:00 )  WBC Count : 10.53 K/uL  RBC Count : 2.55 M/uL  Hemoglobin : 7.5 g/dL  Hematocrit : 23.9 %  Platelet Count - Automated : 196 K/uL  Mean Cell Volume : 93.7 fL  Mean Cell Hemoglobin : 29.4 pg  Mean Cell Hemoglobin Concentration : 31.4 gm/dL  Auto Neutrophil # : x  Auto Lymphocyte # : x  Auto Monocyte # : x  Auto Eosinophil # : x  Auto Basophil # : x  Auto Neutrophil % : x  Auto Lymphocyte % : x  Auto Monocyte % : x  Auto Eosinophil % : x  Auto Basophil % : x    01-21    140  |  112<H>  |  90<H>  ----------------------------<  139<H>  3.8   |  11<L>  |  5.84<H>    Ca    7.5<L>      21 Jan 2023 07:00  Phos  6.0     01-21  Mg     1.90     01-21      _____________________________________________  RADIOLOGY:

## 2023-01-21 NOTE — PROGRESS NOTE ADULT - PROBLEM SELECTOR PLAN 4
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  - discussed with nephrology patient is stable for d/c to [facility] from nephrology perspective. will d/c Bicitra as patient is not tolerating it.

## 2023-01-21 NOTE — PROGRESS NOTE ADULT - ASSESSMENT
62M EtoH misuse c/b chronic pancreatitis, HCV s/p treatment (SVR), emphysema, benign esophageal stricture (s/p stent on 4/2022) admitted at [facility] w/ septic shock septic shock due to pseudomonal PNA, lung abscess, Morganella bacteremia and Klebsiella UTI (now s/p 6 wks zosyn ended 1/6) w/ hospital course c/b YUNIEL on CKD requiring HD and MICU stay, transferred here for intractable nausea/vomiting and esophageal stent removal, now s/p removal w/ EGD sig for esophagitis/gastric ulcer. Hospital course now c/b acute on chronic anemia required multiple transfusion. Now patient is noted to have skin breakdown in Right toes concerning for underline infection secondary to vascular insufficiency.

## 2023-01-21 NOTE — PROGRESS NOTE ADULT - PROBLEM SELECTOR PLAN 8
Septic shock due to pseudomonal PNA with lung abscess, morganella bacteremia and kelbsiella UTI.  Was on [facility] since 11/25, [facility] week [facility] of antibiotics versus possibly longer pending imaging at 4 weeks (12/23)  - CXR 12/23 - Stable RUL lesion, with new RLL pneumonia   - seen by ID  - completed 6 wks zosyn 1/6  - per d/w ID no further imaging as 6 wks abx sufficient treatment

## 2023-01-21 NOTE — PROGRESS NOTE ADULT - ASSESSMENT
62M hx esophageal stricture, chronic pancreatitis, originally presented with n/v, esophageal stent removal in December, course c/b septic shock. Patient s/p first stage left brachiobasilic AVF on 1/5 with Dr. Oro. Vascular surgery reconsulted in setting of bilateral foot wounds and reported nonpalpable pulses. S/p RLE angio on 1/20 with PT plasty.     PLAN:  - Please obtain bilateral lower extremity vein mapping for possible pop-PT bypass   - Will need cardiology to weigh in for optimization/if pt. can tolerate major surgery     C Team Surgery  #[id number]

## 2023-01-22 NOTE — PROGRESS NOTE ADULT - PROBLEM SELECTOR PLAN 4
S/p HD X 3  in ICU in [facility], Cr plateaued at 5.7-6, off HD now; per renal plan for AVF creation on this admission as per d/w OP nephrologist baseline Cr is 3-CKD stage 4-5 and nearing ESRD  - VA renal duplex with severe stenosis of the left renal artery; vascular consulted, no plan for intervention for HESHAM  - sevelamer 800 mg TID, bicarb 1300 mg TID; calcitriol increased to 0.5 for secondary hyperparathyroidism per renal  - s/p AVF w/ vascular on 1/5 to the L arm, L arm precautions  - avoid nephrotoxic  - renally dose meds  - trend Cr and electrolytes closely  - discussed with nephrology patient is stable for d/c to [facility] from nephrology perspective, will d/c Bicitra as patient is not tolerating it  - Per Renal fistula examined with faint bruit, will have vascular f/up on patient

## 2023-01-22 NOTE — PROGRESS NOTE ADULT - PROBLEM SELECTOR PLAN 1
b/l heel wounds and right 2nd and 3rd toe wound.   Podiatry concern for deep infection and possible need for amputation.   wound was seen with wound care at bedside, discussed care and per their evaluation, this is slight improved from last evaluation 1 week prior.   BARRY/PVR result reviewed; mild stenosis in LLE, moderate stenosis in RLE.   Xray foot negative for signs of OM  C/w local wound care by Podiatry  S/p RLE angio by vascular on 1/20 with PT plasty, possible need for pop-PT bypass  B/L LE vein mapping-No evidence of thrombosis or significant venous wall thickening noted within the right and left great saphenous and short saphenous veins  - Cards to weigh in for optimization, f/up cards recs  - F/up further Vascular recs

## 2023-01-22 NOTE — PROGRESS NOTE ADULT - PROBLEM SELECTOR PLAN 1
Pt with YUNIEL on CKD likely multifactorial in the setting of recent COVID infection, poor oral intake. Scr was elevated at 2.20 in September 2022. Pt was recently admitted at [facility]. Scr on admission was significantly elevated at 8.94 on 11/25/22. Pt received 1st HD session on 11/26/22. Last HD session was done on 12/1/22. HD catheter was removed as kidney function was recovering. Scr was elevated at 5.28 on transfer from [facility] on 12/15/22. Scr remained elevated/stable at 5.40 on 1/5/23. Of note kidney sonogram had discrepant size renal artery duplex showing HESHAM but BP well controlled. Discussed with Dr. Dukes (12/21) who knows the patient very well and has seen him inpatient and outpatient over the last several years. As per Dr. Dukes the patient's baseline creatinine is 3 and has advanced CKD and is nearing ESKD. Pt underwent cardiac cath on 1/4/23 , tolerated procedure well. Underwent LUE AVF creation on 1/5/23. Last Scr is elevated/stable at 5.84 today (1/22/23). Pt non uremic, non oliguric. No urgent indication for HD. Monitor labs and urine output. Avoid any potential nephrotoxins. Dose medications as per eGFR. Pt with YUNIEL on CKD likely multifactorial in the setting of recent COVID infection, poor oral intake. Scr was elevated at 2.20 in September 2022. Pt was recently admitted at [facility]. Scr on admission was significantly elevated at 8.94 on 11/25/22. Pt received 1st HD session on 11/26/22. Last HD session was done on 12/1/22. HD catheter was removed as kidney function was recovering. Scr was elevated at 5.28 on transfer from [facility] on 12/15/22. Scr remained elevated/stable at 5.40 on 1/5/23. Of note kidney sonogram had discrepant size renal artery duplex showing HESHAM but BP well controlled. Discussed with Dr. Dukes (12/21) who knows the patient very well and has seen him inpatient and outpatient over the last several years. As per Dr. Dukes the patient's baseline creatinine is 3 and has advanced CKD and is nearing ESKD. Pt underwent cardiac cath on 1/4/23 , tolerated procedure well. Underwent LUE AVF creation on 1/5/23. Last Scr is elevated/stable at 5.84 today (1/22/23). Pt non uremic, non oliguric. No urgent indication for HD. Recommend vascular surgery evaluation of AVF. Monitor labs and urine output. Avoid any potential nephrotoxins. Dose medications as per eGFR.

## 2023-01-22 NOTE — PROGRESS NOTE ADULT - SUBJECTIVE AND OBJECTIVE BOX
[facility] Division of Kidney Diseases & Hypertension  FOLLOW UP NOTE  [phone]--------------------------------------------------------------------------------  HPI: Pt is a 62-year-old Male with Hx of esophageal stricture (S/p stenting in 5/22), Emphysema, HCV, chronic pancreatitis who initially presented to [facility] on 11/25/22 for AMS. Pt was admitted to [facility] on 11/25 for AMS 2/2 Septic shock. Pt was initially intubated for acute respiratory failure and required pressors for hypotension. Pt's hospital course was complicated by YUNIEL/ATN requiring HD. Pt was extubated and was transferred to [facility] for esophageal stent removal by Dr. Erickson. Initial labs on this admission concerning for elevated Scr. Nephrology team following for YUNIEL on CKD.     On review of [facility], it was noted that Scr was elevated at 2.20 in September 2022. Scr on admission was significantly elevated at 8.94 on 11/25/22. Pt received 1st HD session on 11/26/22. Last HD session was done on 12/1/22. HD catheter was removed as kidney function was recovering. Scr was elevated at 5.28 on transfer from [facility] on 12/15/22. Scr remains elevated/stable at 5-6 range. Scr remains elevated/stable at 5.84 today.     Pt seen and evaluated bedside this morning. Pt reports feeling well, endorses no complaints. Denies any chest pain, shortness of breath, or dizziness.        PAST HISTORY  --------------------------------------------------------------------------------  No significant changes to PMH, PSH, FHx, SHx, unless otherwise noted    ALLERGIES & MEDICATIONS  --------------------------------------------------------------------------------  Allergies    Tylenol (Other)    Intolerances      Standing Inpatient Medications  albuterol/ipratropium for Nebulization. 3 milliLiter(s) Nebulizer once  cadexomer iodine 0.9% Gel 1 Application(s) Topical daily  calcitriol   Capsule 0.25 MICROGram(s) Oral daily  epoetin mejia-epbx (RETACRIT) Injectable 91755 Unit(s) SubCutaneous <User Schedule>  folic acid 1 milliGRAM(s) Oral daily  heparin   Injectable 5000 Unit(s) SubCutaneous every 8 hours  metoclopramide 5 milliGRAM(s) Oral three times a day  multivitamin 1 Tablet(s) Oral daily  NIFEdipine XL 30 milliGRAM(s) Oral daily  pancrelipase  (CREON  6,000 Lipase Units) 1 Capsule(s) Oral three times a day with meals  pantoprazole    Tablet 40 milliGRAM(s) Oral every 12 hours  polyethylene glycol 3350 17 Gram(s) Oral two times a day  senna 2 Tablet(s) Oral at bedtime  sevelamer carbonate 800 milliGRAM(s) Oral three times a day  sodium bicarbonate 1300 milliGRAM(s) Oral three times a day  sucralfate 1 Gram(s) Oral every 6 hours  thiamine 100 milliGRAM(s) Oral daily    PRN Inpatient Medications  bisacodyl 5 milliGRAM(s) Oral every 12 hours PRN  ondansetron Injectable 4 milliGRAM(s) IV Push every 6 hours PRN  oxyCODONE    IR 10 milliGRAM(s) Oral every 4 hours PRN  oxyCODONE    IR 5 milliGRAM(s) Oral once PRN  tiZANidine 2 milliGRAM(s) Oral every 8 hours PRN      REVIEW OF SYSTEMS  --------------------------------------------------------------------------------  Gen: No fevers   Respiratory: No dyspnea  CV: No chest pain  GI: No abdominal pain  : No dysuria  MSK: No  edema  Neuro: no dizziness   All other systems were reviewed and are negative, except as noted.    VITALS/PHYSICAL EXAM  --------------------------------------------------------------------------------  T(C): 36.6 (01-22-23 @ 10:25), Max: 37.1 (01-22-23 @ 05:34)  HR: 93 (01-22-23 @ 10:25) (78 - 93)  BP: 123/72 (01-22-23 @ 10:25) (121/83 - 144/72)  RR: 18 (01-22-23 @ 10:25) (17 - 18)  SpO2: 100% (01-22-23 @ 10:25) (99% - 100%)  Wt(kg): --        01-21-23 @ 07:01  -  01-22-23 @ 07:00  --------------------------------------------------------  IN: 0 mL / OUT: 800 mL / NET: -800 mL      Physical Exam:  Gen NAD  HEENT: no JVD  Pulm: CTABL  CV: S1S2,  Abd: Soft,   Ext: - edema B/L LE, B/L foot wound evident   Neuro: Awake and alert  Skin: Warm and dry  Vascular: IRMAE AVF +weak bruit    LABS/STUDIES  --------------------------------------------------------------------------------              7.5    10.53 >-----------<  196      [01-21-23 @ 07:00]              23.9     140  |  112  |  90  ----------------------------<  139      [01-21-23 @ 07:00]  3.8   |  11  |  5.84        Ca     7.5     [01-21-23 @ 07:00]      Mg     1.90     [01-21-23 @ 07:00]      Phos  6.0     [01-21-23 @ 07:00]    Creatinine Trend:  SCr 5.84 [01-21 @ 07:00]  SCr 5.74 [01-20 @ 05:44]  SCr 5.71 [01-19 @ 07:00]  SCr 5.63 [01-18 @ 06:40]  SCr 5.72 [01-17 @ 06:44]      Iron 95, TIBC <125, %sat --      [01-16-23 @ 11:00]

## 2023-01-22 NOTE — PROGRESS NOTE ADULT - PROBLEM SELECTOR PLAN 4
Pt. with acidosis in setting of renal failure. Serum CO2 low at 11 today. Bicitra was discontinued as pt refuses to take. Continue with sodium bicarb tabs 1300 gm PO TID. Consider IV sodium bicarbonate if pt refuses PO. Monitor SCO2    If you have any questions, please feel free to contact me  Treva Mohr  Nephrology Fellow  [phone] / [employer] Teams(Preferred)  (After 5pm or on weekends please page the on-call fellow)

## 2023-01-22 NOTE — PROGRESS NOTE ADULT - SUBJECTIVE AND OBJECTIVE BOX
Patient is a 62y old  Male who presents with a chief complaint of intractable N/V , esophageal stent removal (22 Jan 2023 11:46)      INTERVAL HPI/OVERNIGHT EVENTS:  Seen by me this morning, resting comfortably in bed, c/o some abdominal pain. Tolerating PO.    Review of Systems: 12 point review of systems otherwise negative    MEDICATIONS  (STANDING):  albuterol/ipratropium for Nebulization. 3 milliLiter(s) Nebulizer once  cadexomer iodine 0.9% Gel 1 Application(s) Topical daily  calcitriol   Capsule 0.25 MICROGram(s) Oral daily  epoetin mejia-epbx (RETACRIT) Injectable 61740 Unit(s) SubCutaneous <User Schedule>  folic acid 1 milliGRAM(s) Oral daily  heparin   Injectable 5000 Unit(s) SubCutaneous every 8 hours  metoclopramide 5 milliGRAM(s) Oral three times a day  multivitamin 1 Tablet(s) Oral daily  NIFEdipine XL 30 milliGRAM(s) Oral daily  pancrelipase  (CREON  6,000 Lipase Units) 1 Capsule(s) Oral three times a day with meals  pantoprazole    Tablet 40 milliGRAM(s) Oral every 12 hours  polyethylene glycol 3350 17 Gram(s) Oral two times a day  senna 2 Tablet(s) Oral at bedtime  sevelamer carbonate 800 milliGRAM(s) Oral three times a day  sodium bicarbonate 1300 milliGRAM(s) Oral three times a day  sucralfate 1 Gram(s) Oral every 6 hours  thiamine 100 milliGRAM(s) Oral daily    MEDICATIONS  (PRN):  bisacodyl 5 milliGRAM(s) Oral every 12 hours PRN Constipation  ondansetron Injectable 4 milliGRAM(s) IV Push every 6 hours PRN Nausea and/or Vomiting  oxyCODONE    IR 10 milliGRAM(s) Oral every 4 hours PRN Moderate Pain (4 - 6)-Severe pain (7-10)  oxyCODONE    IR 5 milliGRAM(s) Oral once PRN Breakthrough  tiZANidine 2 milliGRAM(s) Oral every 8 hours PRN Muscle Cramps      Allergies    Tylenol (Other)    Intolerances          Vital Signs Last 24 Hrs  T(C): 36.6 (22 Jan 2023 10:25), Max: 37.1 (22 Jan 2023 05:34)  T(F): 97.9 (22 Jan 2023 10:25), Max: 98.7 (22 Jan 2023 05:34)  HR: 93 (22 Jan 2023 10:25) (78 - 93)  BP: 123/72 (22 Jan 2023 10:25) (121/83 - 144/72)  BP(mean): --  RR: 18 (22 Jan 2023 10:25) (17 - 18)  SpO2: 100% (22 Jan 2023 10:25) (99% - 100%)    Parameters below as of 22 Jan 2023 10:25  Patient On (Oxygen Delivery Method): room air      CAPILLARY BLOOD GLUCOSE          01-21 @ 07:01  -  01-22 @ 07:00  --------------------------------------------------------  IN: 0 mL / OUT: 800 mL / NET: -800 mL        Physical Exam:    Daily     Daily   General:  Well appearing, NAD, cachetic  HEENT:  Nonicteric, PERRLA  CV:  RRR, no murmur, no JVD  Lungs:  CTA B/L, no wheezes, rales, rhonchi  Abdomen:  Soft, non-tender, no distended, positive BS  Extremities:  2+ pulses, no c/c, B/L feet covered with dressing  Skin:  Warm and dry, no rashes  :  No chen  Neuro:  AAOx3, non-focal, CN II-XII grossly intact  No Restraints    LABS:                        7.5    10.53 )-----------( 196      ( 21 Jan 2023 07:00 )             23.9     01-21    140  |  112<H>  |  90<H>  ----------------------------<  139<H>  3.8   |  11<L>  |  5.84<H>    Ca    7.5<L>      21 Jan 2023 07:00  Phos  6.0     01-21  Mg     1.90     01-21              RADIOLOGY & ADDITIONAL TESTS:  Reviewed by me

## 2023-01-22 NOTE — PROGRESS NOTE ADULT - PROBLEM SELECTOR PLAN 8
Septic shock due to pseudomonal PNA with lung abscess, morganella bacteremia and kelbsiella UTI.  Was on Zosyn since 11/25, as per ID in [facility] who recommended 4 week course of antibiotics versus possibly longer pending imaging at 4 weeks (12/23)  - CXR 12/23 - Stable RUL lesion, with new RLL pneumonia   - seen by ID  - completed 6 wks zosyn 1/6  - per d/w ID no further imaging as 6 wks abx sufficient treatment

## 2023-01-22 NOTE — PROGRESS NOTE ADULT - ASSESSMENT
62M EtoH misuse c/b chronic pancreatitis, HCV s/p treatment (SVR), emphysema, benign esophageal stricture (s/p stent on 4/2022) admitted at [facility] w/ septic shock septic shock due to pseudomonal PNA, lung abscess, Morganella bacteremia and Klebsiella UTI (now s/p 6 wks zosyn ended 1/6) w/ hospital course c/b YUNIEL on CKD requiring HD and MICU stay, transferred here for intractable nausea/vomiting and esophageal stent removal, now s/p removal w/ EGD sig for esophagitis/gastric ulcer. Hospital course now c/b acute on chronic anemia required multiple transfusion. Now patient is noted to have skin breakdown in Right toes concerning for underline infection secondary to vascular insufficiency.

## 2023-01-23 NOTE — PROGRESS NOTE ADULT - PROBLEM SELECTOR PLAN 8
Septic shock due to pseudomonal PNA with lung abscess, morganella bacteremia and kelbsiella UTI.  Was on Zosyn since 11/25, as per ID in [facility] who recommended 4 week course of antibiotics versus possibly longer pending imaging at 4 weeks (12/23)  - CXR 12/23 - Stable RUL lesion, with new RLL pneumonia   - seen by ID  - completed 6 wks zosyn 1/6  - per d/w ID no further imaging as 6 wks abx sufficient treatment

## 2023-01-23 NOTE — PROGRESS NOTE ADULT - ATTENDING COMMENTS
YUNIEL on CKD 5    Not symptomatic right now however Cr rising after contrast use, will cont to monitor for signs of uremia  Monitor Is/Os and labs

## 2023-01-23 NOTE — PROGRESS NOTE ADULT - SUBJECTIVE AND OBJECTIVE BOX
[facility]  Division of Hospital Medicine  Vera Burgess MD  Pager: [phone]      Patient is a 62y old  Male who presents with a chief complaint of intractable N/V , esophageal stent removal (23 Jan 2023 14:00)      SUBJECTIVE / OVERNIGHT EVENTS:m Patient examined at bedside. No medical concerns. No chest pain or SOB   ADDITIONAL REVIEW OF SYSTEMS:    MEDICATIONS  (STANDING):  albuterol/ipratropium for Nebulization. 3 milliLiter(s) Nebulizer once  cadexomer iodine 0.9% Gel 1 Application(s) Topical daily  calcitriol   Capsule 0.5 MICROGram(s) Oral daily  epoetin mejia-epbx (RETACRIT) Injectable 81297 Unit(s) SubCutaneous <User Schedule>  folic acid 1 milliGRAM(s) Oral daily  heparin   Injectable 5000 Unit(s) SubCutaneous every 8 hours  metoclopramide 5 milliGRAM(s) Oral three times a day  multivitamin 1 Tablet(s) Oral daily  NIFEdipine XL 30 milliGRAM(s) Oral daily  pancrelipase  (CREON  6,000 Lipase Units) 1 Capsule(s) Oral three times a day with meals  pantoprazole    Tablet 40 milliGRAM(s) Oral every 12 hours  polyethylene glycol 3350 17 Gram(s) Oral two times a day  senna 2 Tablet(s) Oral at bedtime  sevelamer carbonate 800 milliGRAM(s) Oral three times a day  sodium bicarbonate 1300 milliGRAM(s) Oral three times a day  sucralfate 1 Gram(s) Oral every 6 hours  thiamine 100 milliGRAM(s) Oral daily    MEDICATIONS  (PRN):  bisacodyl 5 milliGRAM(s) Oral every 12 hours PRN Constipation  ondansetron Injectable 4 milliGRAM(s) IV Push every 6 hours PRN Nausea and/or Vomiting  oxyCODONE    IR 10 milliGRAM(s) Oral every 4 hours PRN Moderate Pain (4 - 6)-Severe pain (7-10)  tiZANidine 2 milliGRAM(s) Oral every 8 hours PRN Muscle Cramps      CAPILLARY BLOOD GLUCOSE      POCT Blood Glucose.: 280 mg/dL (23 Jan 2023 07:55)    I&O's Summary      PHYSICAL EXAM:  Vital Signs Last 24 Hrs  T(C): 36.8 (23 Jan 2023 11:33), Max: 37.1 (22 Jan 2023 17:15)  T(F): 98.3 (23 Jan 2023 11:33), Max: 98.8 (22 Jan 2023 17:15)  HR: 73 (23 Jan 2023 11:33) (65 - 94)  BP: 136/71 (23 Jan 2023 11:33) (112/61 - 136/71)  BP(mean): --  RR: 18 (23 Jan 2023 11:33) (18 - 18)  SpO2: 100% (23 Jan 2023 11:33) (100% - 100%)    Parameters below as of 23 Jan 2023 11:33  Patient On (Oxygen Delivery Method): room air      General:  Well appearing, NAD, cachetic  HEENT:  Nonicteric, PERRLA  CV:  RRR, no murmur, no JVD  Lungs:  CTA B/L, no wheezes, rales, rhonchi  Abdomen:  Soft, non-tender, no distended, positive BS  Extremities:  2+ pulses, no c/c, B/L feet covered with dressing  Skin:  Warm and dry, no rashes  :  No chen  Neuro:  AAOx3, non-focal, CN II-XII grossly intact  No Restraints    LABS:                        7.4    10.10 )-----------( 232      ( 23 Jan 2023 06:02 )             24.3     01-23    141  |  113<H>  |  96<H>  ----------------------------<  78  3.8   |  11<L>  |  6.47<H>    Ca    7.8<L>      23 Jan 2023 06:02  Phos  5.7     01-23  Mg     1.50     01-23                  RADIOLOGY & ADDITIONAL TESTS:  Results Reviewed:   Imaging Personally Reviewed:  Electrocardiogram Personally Reviewed:    COORDINATION OF CARE:  Care Discussed with Consultants/Other Providers [Y/N]:  Prior or Outpatient Records Reviewed [Y/N]:

## 2023-01-23 NOTE — PROGRESS NOTE ADULT - PROBLEM SELECTOR PLAN 4
S/p HD X 3  in ICU in [facility], Cr plateaued at 5.7-6, off HD now; per renal plan for AVF creation on this admission as per d/w OP nephrologist baseline Cr is 3-CKD stage 4-5 and nearing ESRD  - [facility] renal duplex with severe stenosis of the left renal artery; vascular consulted, no plan for intervention for HESHAM  - sevelamer 800 mg TID, bicarb 1300 mg TID; calcitriol increased to 0.5 for secondary hyperparathyroidism per renal  - s/p AVF w/ vascular on 1/5 to the L arm, L arm precautions  - avoid nephrotoxic  - renally dose meds  - trend Cr and electrolytes closely  - discussed with nephrology patient is stable for d/c to [facility] from nephrology perspective, will d/c Bicitra as patient is not tolerating it  - Per Renal fistula examined with faint bruit, will have vascular f/up on patient

## 2023-01-23 NOTE — PROGRESS NOTE ADULT - PROBLEM SELECTOR PROBLEM 4
Acute kidney injury superimposed on CKD Vital Signs Last 24 Hrs  T(C): 37.6 (08 Aug 2022 22:06), Max: 37.6 (08 Aug 2022 22:06)  T(F): 99.6 (08 Aug 2022 22:06), Max: 99.6 (08 Aug 2022 22:06)  HR: 113 (08 Aug 2022 22:06) (113 - 113)  BP: 67/49 (08 Aug 2022 22:06) (67/49 - 67/49)  BP(mean): --  RR: 22 (08 Aug 2022 22:06) (22 - 22)  SpO2: 84% (08 Aug 2022 22:06) (84% - 84%)    Parameters below as of 08 Aug 2022 22:06  Patient On (Oxygen Delivery Method): mask, nonrebreather  O2 Flow (L/min): 10      PHYSICAL EXAM:  GENERAL: Respiratory distress,   HEAD:  Atraumatic, Normocephalic  EYES: EOMI, PERRLA, conjunctiva and sclera clear  NECK: Supple, No JVD  CHEST/LUNG: Clear to auscultation bilaterally; No wheeze; No crackles; No accessory muscles used  HEART: Regular rate and rhythm; No murmurs;   ABDOMEN: Soft, Nontender, Nondistended; Bowel sounds present; No guarding  EXTREMITIES:  Cold, with weak pulses.   PSYCH: AAOx0  NEUROLOGY: non-focal  SKIN: No rashes or lesions

## 2023-01-23 NOTE — PROGRESS NOTE ADULT - SUBJECTIVE AND OBJECTIVE BOX
SURGERY  Pager: #[phone]    INTERVAL EVENTS/SUBJECTIVE: No acute events overnight. Patient seen and examined at bedside, no new complaints.     ______________________________________________  OBJECTIVE:   T(C): 36.8 (01-23-23 @ 05:15), Max: 37.1 (01-22-23 @ 17:15)  HR: 83 (01-23-23 @ 05:15) (65 - 94)  BP: 112/61 (01-23-23 @ 05:15) (112/61 - 123/72)  RR: 18 (01-23-23 @ 05:15) (18 - 18)  SpO2: 100% (01-23-23 @ 05:15) (100% - 100%)  Wt(kg): --  CAPILLARY BLOOD GLUCOSE      POCT Blood Glucose.: 280 mg/dL (23 Jan 2023 07:55)    I&O's Detail      Physical exam:  Gen: resting in bed comfortably in NAD  Chest: no increased WOB, regular inspiratory effort   Abdomen: Soft, nontender, nondistended with no rebound tenderness or guarding.   Vascular: L groin soft and flat, LE +DP/PT signals   Neuro: awake, alert  ______________________________________________  LABS:  CBC Full  -  ( 23 Jan 2023 06:02 )  WBC Count : 10.10 K/uL  RBC Count : 2.53 M/uL  Hemoglobin : 7.4 g/dL  Hematocrit : 24.3 %  Platelet Count - Automated : 232 K/uL  Mean Cell Volume : 96.0 fL  Mean Cell Hemoglobin : 29.2 pg  Mean Cell Hemoglobin Concentration : 30.5 gm/dL  Auto Neutrophil # : x  Auto Lymphocyte # : x  Auto Monocyte # : x  Auto Eosinophil # : x  Auto Basophil # : x  Auto Neutrophil % : x  Auto Lymphocyte % : x  Auto Monocyte % : x  Auto Eosinophil % : x  Auto Basophil % : x    01-23    141  |  113<H>  |  96<H>  ----------------------------<  78  3.8   |  11<L>  |  6.47<H>    Ca    7.8<L>      23 Jan 2023 06:02  Phos  5.7     01-23  Mg     1.50     01-23      _____________________________________________  RADIOLOGY:

## 2023-01-23 NOTE — PROGRESS NOTE ADULT - SUBJECTIVE AND OBJECTIVE BOX
[facility] DIVISION OF KIDNEY DISEASES AND HYPERTENSION -- FOLLOW UP NOTE  --------------------------------------------------------------------------------  HPI: Pt is a 62-year-old Male with Hx of esophageal stricture (S/p stenting in 5/22), Emphysema, HCV, chronic pancreatitis who initially presented to [facility] on 11/25/22 for AMS. Pt was admitted to [facility] on 11/25 for AMS 2/2 Septic shock. Pt was initially intubated for acute respiratory failure and required pressors for hypotension. Pt's hospital course was complicated by YUNIEL/ATN requiring HD. Pt was extubated and was transferred to [facility] for esophageal stent removal by Dr. Erickson. Initial labs on this admission concerning for elevated Scr. Nephrology team following for YUNIEL on CKD.     On review of [facility], it was noted that Scr was elevated at 2.20 in September 2022. Scr on admission was significantly elevated at 8.94 on 11/25/22. Pt received 1st HD session on 11/26/22. Last HD session was done on 12/1/22. HD catheter was removed as kidney function was recovering. Scr was elevated at 5.28 on transfer from [facility] on 12/15/22. Scr remains elevated/stable at 5-6 range. Scr remains elevated/stable at 6.7 today.     Pt seen and evaluated bedside this morning. Pt reports feeling well, endorses no complaints. Denies any chest pain, shortness of breath, or dizziness.    PAST HISTORY  --------------------------------------------------------------------------------  No significant changes to PMH, PSH, FHx, SHx, unless otherwise noted    ALLERGIES & MEDICATIONS  --------------------------------------------------------------------------------  Allergies    Tylenol (Other)    Intolerances    Standing Inpatient Medications  albuterol/ipratropium for Nebulization. 3 milliLiter(s) Nebulizer once  cadexomer iodine 0.9% Gel 1 Application(s) Topical daily  calcitriol   Capsule 0.5 MICROGram(s) Oral daily  epoetin mejia-epbx (RETACRIT) Injectable 62348 Unit(s) SubCutaneous <User Schedule>  folic acid 1 milliGRAM(s) Oral daily  heparin   Injectable 5000 Unit(s) SubCutaneous every 8 hours  metoclopramide 5 milliGRAM(s) Oral three times a day  multivitamin 1 Tablet(s) Oral daily  NIFEdipine XL 30 milliGRAM(s) Oral daily  pancrelipase  (CREON  6,000 Lipase Units) 1 Capsule(s) Oral three times a day with meals  pantoprazole    Tablet 40 milliGRAM(s) Oral every 12 hours  polyethylene glycol 3350 17 Gram(s) Oral two times a day  senna 2 Tablet(s) Oral at bedtime  sevelamer carbonate 800 milliGRAM(s) Oral three times a day  sodium bicarbonate 1300 milliGRAM(s) Oral three times a day  sucralfate 1 Gram(s) Oral every 6 hours  thiamine 100 milliGRAM(s) Oral daily    PRN Inpatient Medications  bisacodyl 5 milliGRAM(s) Oral every 12 hours PRN  ondansetron Injectable 4 milliGRAM(s) IV Push every 6 hours PRN  oxyCODONE    IR 10 milliGRAM(s) Oral every 4 hours PRN  tiZANidine 2 milliGRAM(s) Oral every 8 hours PRN    REVIEW OF SYSTEMS  --------------------------------------------------------------------------------  Gen: No fevers   Respiratory: No dyspnea  CV: No chest pain  GI: No abdominal pain  : No dysuria  MSK: No  edema  Neuro: no dizziness   All other systems were reviewed and are negative, except as noted.    VITALS/PHYSICAL EXAM  --------------------------------------------------------------------------------  T(C): 36.8 (01-23-23 @ 05:15), Max: 37.1 (01-22-23 @ 17:15)  HR: 83 (01-23-23 @ 05:15) (65 - 94)  BP: 112/61 (01-23-23 @ 05:15) (112/61 - 123/72)  RR: 18 (01-23-23 @ 05:15) (18 - 18)  SpO2: 100% (01-23-23 @ 05:15) (100% - 100%)  Wt(kg): --    Physical Exam:  Gen NAD  HEENT: no JVD  Pulm: CTABL  CV: S1S2,  Abd: Soft,   Ext: - edema B/L LE, B/L foot wound evident   Neuro: Awake and alert  Skin: Warm and dry  Vascular: LUE AVF +weak bruit	    LABS/STUDIES  --------------------------------------------------------------------------------              7.4    10.10 >-----------<  232      [01-23-23 @ 06:02]              24.3     141  |  113  |  96  ----------------------------<  78      [01-23-23 @ 06:02]  3.8   |  11  |  6.47        Ca     7.8     [01-23-23 @ 06:02]      Mg     1.50     [01-23-23 @ 06:02]      Phos  5.7     [01-23-23 @ 06:02]    Creatinine Trend:  SCr 6.47 [01-23 @ 06:02]  SCr 5.84 [01-21 @ 07:00]  SCr 5.74 [01-20 @ 05:44]  SCr 5.71 [01-19 @ 07:00]  SCr 5.63 [01-18 @ 06:40]    Iron 95, TIBC <125, %sat --      [01-16-23 @ 11:00]  Ferritin 311      [01-03-23 @ 06:12]  PTH -- (Ca --)      [12-22-22 @ 06:40]   290  Vitamin D (25OH) 10.4      [01-04-23 @ 06:45]  HbA1c 5.0      [03-22-19 @ 09:03]  TSH 1.380      [09-01-22 @ 08:58]  Lipid: chol 147, TG 88, HDL 59, LDL --      [09-01-22 @ 08:58]

## 2023-01-23 NOTE — PROGRESS NOTE ADULT - ASSESSMENT
62M EtoH misuse c/b chronic pancreatitis, HCV s/p treatment (SVR), emphysema, benign esophageal stricture (s/p stent on 4/2022) admitted at [facility] w/ septic shock septic shock due to pseudomonal PNA, lung abscess, Morganella bacteremia and Klebsiella UTI (now s/p 6 wks zosyn ended 1/6) w/ hospital course c/b YUNIEL on CKD requiring HD and MICU stay, transferred here for intractable nausea/vomiting and esophageal stent removal, now s/p removal w/ EGD sig for esophagitis/gastric ulcer. Hospital course now c/b acute on chronic anemia required multiple transfusion. Now patient is noted to have skin breakdown in Right toes concerning for underline infection secondary to vascular insufficiency.

## 2023-01-23 NOTE — PROGRESS NOTE ADULT - SUBJECTIVE AND OBJECTIVE BOX
Patient is a 62y old  Male who presents with a chief complaint of intractable N/V , esophageal stent removal (23 Jan 2023 12:58)       INTERVAL HPI/OVERNIGHT EVENTS:  Patient seen and evaluated at bedside.  Pt is resting comfortable in NAD. Denies N/V/F/C.  Pain rated at X/10    Allergies    Tylenol (Other)    Intolerances        Vital Signs Last 24 Hrs  T(C): 36.8 (23 Jan 2023 11:33), Max: 37.1 (22 Jan 2023 17:15)  T(F): 98.3 (23 Jan 2023 11:33), Max: 98.8 (22 Jan 2023 17:15)  HR: 73 (23 Jan 2023 11:33) (65 - 94)  BP: 136/71 (23 Jan 2023 11:33) (112/61 - 136/71)  BP(mean): --  RR: 18 (23 Jan 2023 11:33) (18 - 18)  SpO2: 100% (23 Jan 2023 11:33) (100% - 100%)    Parameters below as of 23 Jan 2023 11:33  Patient On (Oxygen Delivery Method): room air        LABS:                        7.4    10.10 )-----------( 232      ( 23 Jan 2023 06:02 )             24.3     01-23    141  |  113<H>  |  96<H>  ----------------------------<  78  3.8   |  11<L>  |  6.47<H>    Ca    7.8<L>      23 Jan 2023 06:02  Phos  5.7     01-23  Mg     1.50     01-23          CAPILLARY BLOOD GLUCOSE      POCT Blood Glucose.: 280 mg/dL (23 Jan 2023 07:55)      Lower Extremity Physical Exam:  Vascular: DP/PT 0/4, B/L, CFT >3 seconds B/L, Temperature gradient warm to cool, B/L.   Neuro: Epicritic sensation intact to the level of digits, B/L.  Musculoskeletal/Ortho: unremarkable   Skin: Right IPJ hallux wound to subQ, fibrotic wound bed, right foot 2nd and 3rd digit medial DIPJ wound to bone with increased granulation, exposed distal phalanx to both digits, no malodor, necrotic periwound edges. Left foot heel  and 5th met base early DTI with no signs of infection.     RADIOLOGY & ADDITIONAL TESTS:

## 2023-01-23 NOTE — PROGRESS NOTE ADULT - ASSESSMENT
62M with bilateral foot wounds   - Patient seen and evaluated   - Afebrile, WBC 10.10  - Right IPJ hallux wound to subQ, fibrotic wound bed, right foot 2nd and 3rd digit medial DIPJ wound to bone with increased granulation, exposed distal phalanx to both digits, no malodor, necrotic periwound edges. Left foot heel  and 5th met base early DTI with no signs of infection.   - Bilateral foot xray: Bilateral soft tissue swelling. No tracking soft tissue gas collections, radiopaque foreign bodies, or gross radiographic evidence for osteomyelitis.  - No wound culture necessary 2/2 to no acute signs of infection   - BARRY/PVRs: RABI 0.66, RTBI 0.3, L BARRY 0.76, L TBI 0.63 small arterial disease b/l   - Discussed the importance of offloading bilateral feet with zflows to avoid developing wounds however patient refused at this time  - s/p RLE angiogram with PT plasty, planning Pop-PT bypass; appreciated   - Pod plan local wound care pending vascular recs   - Seen with attending

## 2023-01-23 NOTE — PROGRESS NOTE ADULT - PROBLEM SELECTOR PLAN 1
b/l heel wounds and right 2nd and 3rd toe wound.   Podiatry concern for deep infection and possible need for amputation.   wound was seen with wound care at bedside, discussed care and per their evaluation, this is slight improved from last evaluation 1 week prior.   BARRY/PVR result reviewed; mild stenosis in LLE, moderate stenosis in RLE.   Xray foot negative for signs of OM  C/w local wound care by Podiatry  S/p RLE angio by vascular on 1/20 with PT plasty, possible need for pop-PT bypass  B/L LE vein mapping-No evidence of thrombosis or significant venous wall thickening noted within the right and left great saphenous and short saphenous veins  [ ]  Cards to weigh in for optimization, f/up cards recs  - F/up further Vascular recs

## 2023-01-23 NOTE — PROGRESS NOTE ADULT - ASSESSMENT
62M hx esophageal stricture, chronic pancreatitis, originally presented with n/v, esophageal stent removal in December, course c/b septic shock. Patient s/p first stage left brachiobasilic AVF on 1/5 with Dr. Oro. Vascular surgery reconsulted in setting of bilateral foot wounds and reported nonpalpable pulses. S/p RLE angio on 1/20 with PT plasty, now planning for possible pop-PT bypass    PLAN:  - Vein mapping completed, reviewed   - Will need cardiology to weigh in for optimization/if pt. can tolerate major surgery     C Team Surgery  #[id number]

## 2023-01-23 NOTE — PROGRESS NOTE ADULT - PROBLEM SELECTOR PLAN 2
Secondary hyperparathyroidism. Ionized calcium was low. Continue with calcitriol 0.5mcg daily. Continue Sevelamer 800mg mg TID with meals. Monitor phos levels

## 2023-01-23 NOTE — CHART NOTE - NSCHARTNOTEFT_GEN_A_CORE
Reason for Follow-Up Assessment: Severe Malnutrition    62M EtoH misuse c/b chronic pancreatitis, HCV s/p treatment (SVR), emphysema, benign esophageal stricture (s/p stent on 4/2022) admitted at [facility] w/ septic shock septic shock due to pseudomonal PNA, lung abscess, Morganella bacteremia and Klebsiella UTI (now s/p 6 wks zosyn ended 1/6) w/ hospital course c/b YUNIEL on CKD requiring HD and MICU stay, transferred here for intractable nausea/vomiting and esophageal stent removal, now s/p removal w/ EGD sig for esophagitis/gastric ulcer. Hospital course now c/b acute on chronic anemia required multiple transfusions. Patient noted to require outpatient GI f/u for repeat upper endoscopy and colonoscopy. Patient is s/p  RLE angio by vascular on 1/20 with PT plasty, possible need for pop-PT bypass.     Continues to c/o abdominal pain - Pt. with chronic pancreatitis, refuses Creon, receiving Reglan.  Patient noted to be tolerating PO diet at this time. Diet optimized via Suplena 2x daily (850 kcals, 21.2g protein).    Source: [ ] Patient [ ] Family [ ] RN [X] Chart      Diet, Regular:   For patients receiving Renal Replacement - No Protein Restr, No Conc K, No Conc Phos, Low Sodium (RENAL)  No Caffeine  Supplement Feeding Modality:  Oral  Suplena Cans or Servings Per Day:  2       Frequency:  Daily (01-14-23 @ 13:58)    GI: WDL. Last BM noted on [  1/21 ]    PO intake:  [ ] Poor < 50%  [ ] Fair 50-75% [ ] Good  % [X] Inconsistent / Variable PO intake noted, mostly reported as fair    Enteral /Parenteral Nutrition:       [ X ] n/a    Anthropometrics: Height (cm): 188 (01-20), 177.8 (11-25)  Weight (kg): 55.9 (01-20)  BMI (kg/m2): 15.8 (01-20), 17.7 (01-20), 13.4 (11-25)    Edema: 2+ edema to lt and rt foot per flowsheets    Pressure Injuries: Stage III sacral spine pressure injury noted    _______________ Pertinent Medications_______________  MEDICATIONS  (STANDING):  albuterol/ipratropium for Nebulization. 3 milliLiter(s) Nebulizer once  cadexomer iodine 0.9% Gel 1 Application(s) Topical daily  calcitriol   Capsule 0.5 MICROGram(s) Oral daily  epoetin mejia-epbx (RETACRIT) Injectable 26972 Unit(s) SubCutaneous <User Schedule>  folic acid 1 milliGRAM(s) Oral daily  heparin   Injectable 5000 Unit(s) SubCutaneous every 8 hours  metoclopramide 5 milliGRAM(s) Oral three times a day  multivitamin 1 Tablet(s) Oral daily  NIFEdipine XL 30 milliGRAM(s) Oral daily  pancrelipase  (CREON  6,000 Lipase Units) 1 Capsule(s) Oral three times a day with meals  pantoprazole    Tablet 40 milliGRAM(s) Oral every 12 hours  polyethylene glycol 3350 17 Gram(s) Oral two times a day  senna 2 Tablet(s) Oral at bedtime  sevelamer carbonate 800 milliGRAM(s) Oral three times a day  sodium bicarbonate 1300 milliGRAM(s) Oral three times a day  sucralfate 1 Gram(s) Oral every 6 hours  thiamine 100 milliGRAM(s) Oral daily    MEDICATIONS  (PRN):  bisacodyl 5 milliGRAM(s) Oral every 12 hours PRN Constipation  ondansetron Injectable 4 milliGRAM(s) IV Push every 6 hours PRN Nausea and/or Vomiting  oxyCODONE    IR 10 milliGRAM(s) Oral every 4 hours PRN Moderate Pain (4 - 6)-Severe pain (7-10)  tiZANidine 2 milliGRAM(s) Oral every 8 hours PRN Muscle Cramps      __________________ Pertinent Labs__________________   01-23 Na141 mmol/L Glu 78 mg/dL K+ 3.8 mmol/L Cr  6.47 mg/dL<H> BUN 96 mg/dL<H> 01-23 Phos 5.7 mg/dL<H>    POCT Blood Glucose.: 280 mg/dL (01-23-23 @ 07:55)      Estimated Needs:   [ X ] no change since previous assessment  [ ] recalculated:     Previous Nutrition Diagnosis: Severe Malnutrition, Altered Nutrition Related Labs    Nutrition Diagnosis is [X] ongoing  [ ] resolved [ ] not applicable     New Nutrition Diagnosis: n/a     Monitoring and Evaluation:   [ x ] Tolerance to diet prescription / adequacy of meal intake  [ x ] Weight trends   [ x ] Pertinent labs    Recommendations:  1) Diet / Supplement Changes: continue as ordered.  2) Obtain and record current weights to best monitor for acute changes in nutritional status.  3) Please consistently document % meal intake in nursing flowsheets.     Nisha Nelson MS, RDN, CDN  Pager [phone]  Also available on MS Teams Reason for Follow-Up Assessment: Severe Malnutrition    62M EtoH misuse c/b chronic pancreatitis, HCV s/p treatment (SVR), emphysema, benign esophageal stricture (s/p stent on 4/2022) admitted at [facility] [place] w/ septic shock septic shock due to pseudomonal PNA, lung abscess, Morganella bacteremia and Klebsiella UTI (now s/p 6 wks zosyn ended 1/6) w/ hospital course c/b YUNIEL on CKD requiring HD and MICU stay, transferred here for intractable nausea/vomiting and esophageal stent removal, now s/p removal w/ EGD sig for esophagitis/gastric ulcer. Hospital course now c/b acute on chronic anemia required multiple transfusions. Patient noted to require outpatient GI f/u for repeat upper endoscopy and colonoscopy. Patient is s/p  RLE angio by vascular on 1/20 with PT plasty, possible need for pop-PT bypass.     Continues to c/o abdominal pain - Pt. with chronic pancreatitis, refuses Creon, receiving Reglan.  Patient noted to be tolerating PO diet at this time. Diet optimized via Suplena 2x daily (850 kcals, 21.2g protein).  RD observed patient's tray table and white bags filled with an excess of food and beverage items.  Informed RN and unit manager.    Source: [X] Patient [ ] Family [X] RN [X] Chart      Diet, Regular:   For patients receiving Renal Replacement - No Protein Restr, No Conc K, No Conc Phos, Low Sodium (RENAL)  No Caffeine  Supplement Feeding Modality:  Oral  Suplena Cans or Servings Per Day:  2       Frequency:  Daily (01-14-23 @ 13:58)    GI: WDL. Last BM noted on [  1/21 ]    PO intake:  [ ] Poor < 50%  [ ] Fair 50-75% [ ] Good  % [X] Inconsistent / Variable PO intake noted, mostly reported as fair    Enteral /Parenteral Nutrition:       [ X ] n/a    Anthropometrics: Height (cm): 188 (01-20), 177.8 (11-25)  Weight (kg): 55.9 (01-20)  BMI (kg/m2): 15.8 (01-20), 17.7 (01-20), 13.4 (11-25)    Edema: 2+ edema to lt and rt foot per flowsheets    Pressure Injuries: Stage III sacral spine pressure injury noted    _______________ Pertinent Medications_______________  MEDICATIONS  (STANDING):  albuterol/ipratropium for Nebulization. 3 milliLiter(s) Nebulizer once  cadexomer iodine 0.9% Gel 1 Application(s) Topical daily  calcitriol   Capsule 0.5 MICROGram(s) Oral daily  epoetin mejia-epbx (RETACRIT) Injectable 75195 Unit(s) SubCutaneous <User Schedule>  folic acid 1 milliGRAM(s) Oral daily  heparin   Injectable 5000 Unit(s) SubCutaneous every 8 hours  metoclopramide 5 milliGRAM(s) Oral three times a day  multivitamin 1 Tablet(s) Oral daily  NIFEdipine XL 30 milliGRAM(s) Oral daily  pancrelipase  (CREON  6,000 Lipase Units) 1 Capsule(s) Oral three times a day with meals  pantoprazole    Tablet 40 milliGRAM(s) Oral every 12 hours  polyethylene glycol 3350 17 Gram(s) Oral two times a day  senna 2 Tablet(s) Oral at bedtime  sevelamer carbonate 800 milliGRAM(s) Oral three times a day  sodium bicarbonate 1300 milliGRAM(s) Oral three times a day  sucralfate 1 Gram(s) Oral every 6 hours  thiamine 100 milliGRAM(s) Oral daily    MEDICATIONS  (PRN):  bisacodyl 5 milliGRAM(s) Oral every 12 hours PRN Constipation  ondansetron Injectable 4 milliGRAM(s) IV Push every 6 hours PRN Nausea and/or Vomiting  oxyCODONE    IR 10 milliGRAM(s) Oral every 4 hours PRN Moderate Pain (4 - 6)-Severe pain (7-10)  tiZANidine 2 milliGRAM(s) Oral every 8 hours PRN Muscle Cramps      __________________ Pertinent Labs__________________   01-23 Na141 mmol/L Glu 78 mg/dL K+ 3.8 mmol/L Cr  6.47 mg/dL<H> BUN 96 mg/dL<H> 01-23 Phos 5.7 mg/dL<H>    CAPILLARY BLOOD GLUCOSE  POCT Blood Glucose.: 280 mg/dL (23 Jan 2023 07:55)      Estimated Needs:   [ X ] no change since previous assessment  [ ] recalculated:     Previous Nutrition Diagnosis: Severe Malnutrition, Altered Nutrition Related Labs    Nutrition Diagnosis is [X] ongoing  [ ] resolved [ ] not applicable     New Nutrition Diagnosis: n/a     Monitoring and Evaluation:   [ x ] Tolerance to diet prescription / adequacy of meal intake  [ x ] Weight trends   [ x ] Pertinent labs    Recommendations:  1) Diet / Supplement Changes: continue as ordered. Consider adding CSTCHO if BG remains elevated.  2) Obtain and record current weights to best monitor for acute changes in nutritional status.  3) Please consistently document % meal intake in nursing flowsheets.   4) Increased POCT glucose noted, trend BG.    Nisha Nelson, MS, RDN, CDN  Pager [phone]  Also available on MS Teams

## 2023-01-23 NOTE — PROGRESS NOTE ADULT - PROBLEM SELECTOR PLAN 4
Pt. with acidosis in setting of renal failure. Serum CO2 low at 11 today. Bicitra was discontinued as pt refuses to take. Continue with sodium bicarb tabs 1300 gm PO TID. Recommend IV bicarb (5W with 150mEq sodium bicarbonate) @ 100cc/hr for total of 1 L today. Consider IV sodium bicarbonate if pt refuses PO. Monitor SCO2    If you have any questions, please feel free to contact me  Jeff Pete  Nephrology Fellow  [phone]/ [employer] Teams(Preferred)  (After 5pm or on weekends please page the on-call fellow).

## 2023-01-23 NOTE — PROGRESS NOTE ADULT - PROBLEM SELECTOR PLAN 1
Pt with YUNIEL on CKD likely multifactorial in the setting of recent COVID infection, poor oral intake. Scr was elevated at 2.20 in September 2022. Pt was recently admitted at [facility]. Scr on admission was significantly elevated at 8.94 on 11/25/22. Pt received 1st HD session on 11/26/22. Last HD session was done on 12/1/22. HD catheter was removed as kidney function was recovering. Scr was elevated at 5.28 on transfer from [facility] on 12/15/22. Scr remained elevated/stable at 5.40 on 1/5/23. Of note kidney sonogram had discrepant size renal artery duplex showing HESHAM but BP well controlled. Discussed with Dr. Dukes (12/21) who knows the patient very well and has seen him inpatient and outpatient over the last several years. As per Dr. Dukes the patient's baseline creatinine is 3 and has advanced CKD and is nearing ESKD. Pt underwent cardiac cath on 1/4/23 , tolerated procedure well. Underwent LUE AVF creation on 1/5/23. Last Scr is elevated/stable at 6.47 today (1/23/23). Pt non uremic, non oliguric. No urgent indication for HD. Recommend vascular surgery evaluation of AVF. Monitor labs and urine output. Avoid any potential nephrotoxins. Dose medications as per eGFR.

## 2023-01-24 NOTE — PROGRESS NOTE ADULT - SUBJECTIVE AND OBJECTIVE BOX
SURGERY  Pager: #[phone]      INTERVAL EVENTS/SUBJECTIVE: No acute events overnight.  Patient seen and examined at bedside, no new complaints.     ______________________________________________  OBJECTIVE:   T(C): 36.4 (01-24-23 @ 05:55), Max: 36.8 (01-23-23 @ 11:33)  HR: 85 (01-24-23 @ 05:55) (73 - 91)  BP: 106/76 (01-24-23 @ 05:55) (106/76 - 136/71)  RR: 17 (01-24-23 @ 05:55) (17 - 18)  SpO2: 100% (01-24-23 @ 05:55) (100% - 100%)  Wt(kg): --  CAPILLARY BLOOD GLUCOSE        I&O's Detail    23 Jan 2023 07:01  -  24 Jan 2023 07:00  --------------------------------------------------------  IN:  Total IN: 0 mL    OUT:    Voided (mL): 300 mL  Total OUT: 300 mL    Total NET: -300 mL          Physical exam:  Gen: resting in bed comfortably in NAD  Chest: no increased WOB, regular inspiratory effort   Abdomen: Soft, nontender, nondistended with no rebound tenderness or guarding.   Vascular: L groin soft and flat, LE +DP/PT signals   Neuro: awake, alert  ______________________________________________  LABS:  CBC Full  -  ( 24 Jan 2023 06:59 )  WBC Count : 11.79 K/uL  RBC Count : 2.78 M/uL  Hemoglobin : 8.2 g/dL  Hematocrit : 28.0 %  Platelet Count - Automated : 239 K/uL  Mean Cell Volume : 100.7 fL  Mean Cell Hemoglobin : 29.5 pg  Mean Cell Hemoglobin Concentration : 29.3 gm/dL  Auto Neutrophil # : x  Auto Lymphocyte # : x  Auto Monocyte # : x  Auto Eosinophil # : x  Auto Basophil # : x  Auto Neutrophil % : x  Auto Lymphocyte % : x  Auto Monocyte % : x  Auto Eosinophil % : x  Auto Basophil % : x    01-23    141  |  113<H>  |  96<H>  ----------------------------<  78  3.8   |  11<L>  |  6.47<H>    Ca    7.8<L>      23 Jan 2023 06:02  Phos  5.7     01-23  Mg     1.50     01-23      _____________________________________________  RADIOLOGY:

## 2023-01-24 NOTE — PROGRESS NOTE ADULT - PROBLEM SELECTOR PLAN 4
Pt. with acidosis in setting of renal failure. Serum CO2 low at 11 today. Bicitra was discontinued as pt refuses to take. Continue with sodium bicarb tabs 1300 gm PO TID. Recommend IV bicarb (5W with 150mEq sodium bicarbonate) @ 100cc/hr for total of 1 L today. Consider IV sodium bicarbonate if pt refuses PO. Monitor SCO2    If you have any questions, please feel free to contact me  Jeff Pete  Nephrology Fellow  [phone]/ [employer] Teams(Preferred)  (After 5pm or on weekends please page the on-call fellow)

## 2023-01-24 NOTE — PROGRESS NOTE ADULT - PROBLEM SELECTOR PLAN 4
S/p HD X 3  in ICU in [facility], Cr plateaued at 5.7-6, off HD now; per renal plan for AVF creation on this admission as per d/w OP nephrologist baseline Cr is 3-CKD stage 4-5 and nearing ESRD  - [facility] renal duplex with severe stenosis of the left renal artery; vascular consulted, no plan for intervention for HESHAM  - sevelamer 800 mg TID, bicarb 1300 mg TID; calcitriol increased to 0.5 for secondary hyperparathyroidism per renal  - s/p AVF w/ vascular on 1/5 to the L arm, L arm precautions  - avoid nephrotoxic  - renally dose meds  - trend Cr and electrolytes closely  - discussed with nephrology patient is stable for d/c to [facility] from nephrology perspective, will d/c Bicitra as patient is not tolerating it  - Per Renal fistula examined with faint bruit, will have vascular f/up on patient

## 2023-01-24 NOTE — PROGRESS NOTE ADULT - SUBJECTIVE AND OBJECTIVE BOX
[facility]  Division of Hospital Medicine  Vera Burgess MD  Pager: [phone]      Patient is a 62y old  Male who presents with a chief complaint of intractable N/V , esophageal stent removal (24 Jan 2023 08:36)      SUBJECTIVE / OVERNIGHT EVENTS: Appreciate cards clearance   ADDITIONAL REVIEW OF SYSTEMS:    MEDICATIONS  (STANDING):  albuterol/ipratropium for Nebulization. 3 milliLiter(s) Nebulizer once  cadexomer iodine 0.9% Gel 1 Application(s) Topical daily  calcitriol   Capsule 0.5 MICROGram(s) Oral daily  epoetin mejia-epbx (RETACRIT) Injectable 25692 Unit(s) SubCutaneous <User Schedule>  folic acid 1 milliGRAM(s) Oral daily  heparin   Injectable 5000 Unit(s) SubCutaneous every 8 hours  metoclopramide 5 milliGRAM(s) Oral three times a day  multivitamin 1 Tablet(s) Oral daily  NIFEdipine XL 30 milliGRAM(s) Oral daily  pancrelipase  (CREON  6,000 Lipase Units) 1 Capsule(s) Oral three times a day with meals  pantoprazole    Tablet 40 milliGRAM(s) Oral every 12 hours  polyethylene glycol 3350 17 Gram(s) Oral two times a day  senna 2 Tablet(s) Oral at bedtime  sevelamer carbonate 800 milliGRAM(s) Oral three times a day  sodium bicarbonate 1300 milliGRAM(s) Oral three times a day  sucralfate 1 Gram(s) Oral every 6 hours  thiamine 100 milliGRAM(s) Oral daily    MEDICATIONS  (PRN):  bisacodyl 5 milliGRAM(s) Oral every 12 hours PRN Constipation  ondansetron Injectable 4 milliGRAM(s) IV Push every 6 hours PRN Nausea and/or Vomiting  oxyCODONE    IR 10 milliGRAM(s) Oral every 4 hours PRN Moderate Pain (4 - 6)-Severe pain (7-10)  tiZANidine 2 milliGRAM(s) Oral every 8 hours PRN Muscle Cramps      CAPILLARY BLOOD GLUCOSE        I&O's Summary    23 Jan 2023 07:01  -  24 Jan 2023 07:00  --------------------------------------------------------  IN: 0 mL / OUT: 300 mL / NET: -300 mL        PHYSICAL EXAM:  Vital Signs Last 24 Hrs  T(C): 36.4 (24 Jan 2023 05:55), Max: 36.8 (23 Jan 2023 11:33)  T(F): 97.5 (24 Jan 2023 05:55), Max: 98.3 (23 Jan 2023 11:33)  HR: 85 (24 Jan 2023 05:55) (73 - 91)  BP: 106/76 (24 Jan 2023 05:55) (106/76 - 136/71)  BP(mean): --  RR: 17 (24 Jan 2023 05:55) (17 - 18)  SpO2: 100% (24 Jan 2023 05:55) (100% - 100%)    Parameters below as of 24 Jan 2023 05:55  Patient On (Oxygen Delivery Method): room air      CONSTITUTIONAL: NAD, well-developed, well-groomed  EYES: PERRLA; conjunctiva and sclera clear  ENMT: Moist oral mucosa, no pharyngeal injection or exudates; normal dentition  NECK: Supple, no palpable masses; no thyromegaly  RESPIRATORY: Normal respiratory effort; lungs are clear to auscultation bilaterally  CARDIOVASCULAR: Regular rate and rhythm, normal S1 and S2, no murmur/rub/gallop; No lower extremity edema; Peripheral pulses are 2+ bilaterally  ABDOMEN: Nontender to palpation, normoactive bowel sounds, no rebound/guarding; No hepatosplenomegaly  MUSCULOSKELETAL:  Normal gait; no clubbing or cyanosis of digits; no joint swelling or tenderness to palpation  PSYCH: A+O to person, place, and time; affect appropriate  NEUROLOGY: CN 2-12 are intact and symmetric; no gross sensory deficits   SKIN: No rashes; no palpable lesions    LABS:                        8.2    11.79 )-----------( 239      ( 24 Jan 2023 06:59 )             28.0     01-23    141  |  113<H>  |  96<H>  ----------------------------<  78  3.8   |  11<L>  |  6.47<H>    Ca    7.8<L>      23 Jan 2023 06:02  Phos  5.7     01-23  Mg     1.50     01-23                  RADIOLOGY & ADDITIONAL TESTS:  Results Reviewed:   Imaging Personally Reviewed:  Electrocardiogram Personally Reviewed:    COORDINATION OF CARE:  Care Discussed with Consultants/Other Providers [Y/N]:  Prior or Outpatient Records Reviewed [Y/N]:   [facility]  Division of Hospital Medicine  Vera Burgess MD  Pager: [phone]      Patient is a 62y old  Male who presents with a chief complaint of intractable N/V , esophageal stent removal (24 Jan 2023 08:36)      SUBJECTIVE / OVERNIGHT EVENTS: Appreciate cards clearance. Patient upset with clearing of room. Asking to leave AMA but after talking - agreed to stay.   ADDITIONAL REVIEW OF SYSTEMS:    MEDICATIONS  (STANDING):  albuterol/ipratropium for Nebulization. 3 milliLiter(s) Nebulizer once  cadexomer iodine 0.9% Gel 1 Application(s) Topical daily  calcitriol   Capsule 0.5 MICROGram(s) Oral daily  epoetin mejia-epbx (RETACRIT) Injectable 04730 Unit(s) SubCutaneous <User Schedule>  folic acid 1 milliGRAM(s) Oral daily  heparin   Injectable 5000 Unit(s) SubCutaneous every 8 hours  metoclopramide 5 milliGRAM(s) Oral three times a day  multivitamin 1 Tablet(s) Oral daily  NIFEdipine XL 30 milliGRAM(s) Oral daily  pancrelipase  (CREON  6,000 Lipase Units) 1 Capsule(s) Oral three times a day with meals  pantoprazole    Tablet 40 milliGRAM(s) Oral every 12 hours  polyethylene glycol 3350 17 Gram(s) Oral two times a day  senna 2 Tablet(s) Oral at bedtime  sevelamer carbonate 800 milliGRAM(s) Oral three times a day  sodium bicarbonate 1300 milliGRAM(s) Oral three times a day  sucralfate 1 Gram(s) Oral every 6 hours  thiamine 100 milliGRAM(s) Oral daily    MEDICATIONS  (PRN):  bisacodyl 5 milliGRAM(s) Oral every 12 hours PRN Constipation  ondansetron Injectable 4 milliGRAM(s) IV Push every 6 hours PRN Nausea and/or Vomiting  oxyCODONE    IR 10 milliGRAM(s) Oral every 4 hours PRN Moderate Pain (4 - 6)-Severe pain (7-10)  tiZANidine 2 milliGRAM(s) Oral every 8 hours PRN Muscle Cramps      CAPILLARY BLOOD GLUCOSE        I&O's Summary    23 Jan 2023 07:01  -  24 Jan 2023 07:00  --------------------------------------------------------  IN: 0 mL / OUT: 300 mL / NET: -300 mL        PHYSICAL EXAM:  Vital Signs Last 24 Hrs  T(C): 36.4 (24 Jan 2023 05:55), Max: 36.8 (23 Jan 2023 11:33)  T(F): 97.5 (24 Jan 2023 05:55), Max: 98.3 (23 Jan 2023 11:33)  HR: 85 (24 Jan 2023 05:55) (73 - 91)  BP: 106/76 (24 Jan 2023 05:55) (106/76 - 136/71)  BP(mean): --  RR: 17 (24 Jan 2023 05:55) (17 - 18)  SpO2: 100% (24 Jan 2023 05:55) (100% - 100%)    Parameters below as of 24 Jan 2023 05:55  Patient On (Oxygen Delivery Method): room air      General:  Well appearing, NAD, cachetic  HEENT:  Nonicteric, PERRLA  CV:  RRR, no murmur, no JVD  Lungs:  CTA B/L, no wheezes, rales, rhonchi  Abdomen:  Soft, non-tender, no distended, positive BS  Extremities:  2+ pulses, no c/c, B/L feet covered with dressing  Skin:  Warm and dry, no rashes  :  No chen  Neuro:  AAOx3, non-focal, CN II-XII grossly intact  No Restraints      LABS:                        8.2    11.79 )-----------( 239      ( 24 Jan 2023 06:59 )             28.0     01-23    141  |  113<H>  |  96<H>  ----------------------------<  78  3.8   |  11<L>  |  6.47<H>    Ca    7.8<L>      23 Jan 2023 06:02  Phos  5.7     01-23  Mg     1.50     01-23                  RADIOLOGY & ADDITIONAL TESTS:  Results Reviewed:   Imaging Personally Reviewed:  Electrocardiogram Personally Reviewed:    COORDINATION OF CARE:  Care Discussed with Consultants/Other Providers [Y/N]:  Prior or Outpatient Records Reviewed [Y/N]:

## 2023-01-24 NOTE — PROGRESS NOTE ADULT - ASSESSMENT
62M EtoH misuse c/b chronic pancreatitis, HCV s/p treatment (SVR), emphysema, benign esophageal stricture (s/p stent on 4/2022) admitted at [facility] w/ septic shock septic shock due to pseudomonal PNA, lung abscess, Morganella bacteremia and Klebsiella UTI (now s/p 6 wks zosyn ended 1/6) w/ hospital course c/b YUNIEL on CKD requiring HD and MICU stay, transferred here for intractable nausea/vomiting and esophageal stent removal, now s/p removal w/ EGD sig for esophagitis/gastric ulcer. Hospital course now c/b acute on chronic anemia required multiple transfusion. Now patient is noted to have skin breakdown in Right toes concerning for underline infection secondary to vascular insufficiency. Plan for pop-PT bypass surgery on 1/26.

## 2023-01-24 NOTE — PROGRESS NOTE ADULT - PROBLEM SELECTOR PLAN 2
Secondary hyperparathyroidism. Ionized calcium was low. Continue with calcitriol 0.5mcg daily. Continue Sevelamer 800mg mg TID with meals. Monitor phos levels.

## 2023-01-24 NOTE — PROGRESS NOTE ADULT - PROBLEM SELECTOR PLAN 1
Pt with YUNIEL on CKD likely multifactorial in the setting of recent COVID infection, poor oral intake. Scr was elevated at 2.20 in September 2022. Pt was recently admitted at [facility]. Scr on admission was significantly elevated at 8.94 on 11/25/22. Pt received 1st HD session on 11/26/22. Last HD session was done on 12/1/22. HD catheter was removed as kidney function was recovering. Scr was elevated at 5.28 on transfer from [facility] on 12/15/22. Scr remained elevated/stable at 5.40 on 1/5/23. Of note kidney sonogram had discrepant size renal artery duplex showing HESHAM but BP well controlled. Discussed with Dr. Dukes (12/21) who knows the patient very well and has seen him inpatient and outpatient over the last several years. As per Dr. Dukes the patient's baseline creatinine is 3 and has advanced CKD and is nearing ESKD. Pt underwent cardiac cath on 1/4/23 , tolerated procedure well. Underwent LUE AVF creation on 1/5/23. Last Scr is elevated/stable at 6.48 today (1/24/23). Pt non uremic, non oliguric. No urgent indication for HD. Recommend vascular surgery evaluation of AVF. Monitor labs and urine output. Avoid any potential nephrotoxins. Dose medications as per eGFR.

## 2023-01-24 NOTE — PROGRESS NOTE ADULT - SUBJECTIVE AND OBJECTIVE BOX
[facility] DIVISION OF KIDNEY DISEASES AND HYPERTENSION -- FOLLOW UP NOTE  --------------------------------------------------------------------------------  HPI: Pt is a 62-year-old Male with Hx of esophageal stricture (S/p stenting in 5/22), Emphysema, HCV, chronic pancreatitis who initially presented to [facility] on 11/25/22 for AMS. Pt was admitted to [facility] on 11/25 for AMS 2/2 Septic shock. Pt was initially intubated for acute respiratory failure and required pressors for hypotension. Pt's hospital course was complicated by YUNIEL/ATN requiring HD. Pt was extubated and was transferred to [facility] for esophageal stent removal by Dr. Erickson. Initial labs on this admission concerning for elevated Scr. Nephrology team following for YUNIEL on CKD.     On review of [facility], it was noted that Scr was elevated at 2.20 in September 2022. Scr on admission was significantly elevated at 8.94 on 11/25/22. Pt received 1st HD session on 11/26/22. Last HD session was done on 12/1/22. HD catheter was removed as kidney function was recovering. Scr was elevated at 5.28 on transfer from [facility] on 12/15/22. Scr remains elevated/stable at 5-6 range. Scr remains elevated/stable at 6. today.     Pt seen and evaluated bedside this morning. Pt reports feeling well, endorses no complaints. Denies any chest pain, shortness of breath, or dizziness.    PAST HISTORY  --------------------------------------------------------------------------------  No significant changes to PMH, PSH, FHx, SHx, unless otherwise noted    ALLERGIES & MEDICATIONS  --------------------------------------------------------------------------------  Allergies    Tylenol (Other)    Intolerances    Standing Inpatient Medications  albuterol/ipratropium for Nebulization. 3 milliLiter(s) Nebulizer once  cadexomer iodine 0.9% Gel 1 Application(s) Topical daily  calcitriol   Capsule 0.5 MICROGram(s) Oral daily  epoetin mejia-epbx (RETACRIT) Injectable 43455 Unit(s) SubCutaneous <User Schedule>  folic acid 1 milliGRAM(s) Oral daily  heparin   Injectable 5000 Unit(s) SubCutaneous every 8 hours  metoclopramide 5 milliGRAM(s) Oral three times a day  multivitamin 1 Tablet(s) Oral daily  NIFEdipine XL 30 milliGRAM(s) Oral daily  pancrelipase  (CREON  6,000 Lipase Units) 1 Capsule(s) Oral three times a day with meals  pantoprazole    Tablet 40 milliGRAM(s) Oral every 12 hours  polyethylene glycol 3350 17 Gram(s) Oral two times a day  senna 2 Tablet(s) Oral at bedtime  sevelamer carbonate 800 milliGRAM(s) Oral three times a day  sodium bicarbonate 1300 milliGRAM(s) Oral three times a day  sucralfate 1 Gram(s) Oral every 6 hours  thiamine 100 milliGRAM(s) Oral daily    PRN Inpatient Medications  bisacodyl 5 milliGRAM(s) Oral every 12 hours PRN  ondansetron Injectable 4 milliGRAM(s) IV Push every 6 hours PRN  oxyCODONE    IR 10 milliGRAM(s) Oral every 4 hours PRN  tiZANidine 2 milliGRAM(s) Oral every 8 hours PRN    REVIEW OF SYSTEMS  --------------------------------------------------------------------------------  Gen: No fevers   Respiratory: No dyspnea  CV: No chest pain  GI: No abdominal pain  : No dysuria  MSK: No  edema  Neuro: no dizziness   All other systems were reviewed and are negative, except as noted.    VITALS/PHYSICAL EXAM  --------------------------------------------------------------------------------  T(C): 36.3 (01-24-23 @ 11:02), Max: 36.7 (01-23-23 @ 17:44)  HR: 88 (01-24-23 @ 11:02) (84 - 91)  BP: 133/73 (01-24-23 @ 11:02) (106/76 - 133/73)  RR: 16 (01-24-23 @ 11:02) (16 - 18)  SpO2: 100% (01-24-23 @ 11:02) (100% - 100%)  Wt(kg): --    01-23-23 @ 07:01  -  01-24-23 @ 07:00  --------------------------------------------------------  IN: 0 mL / OUT: 300 mL / NET: -300 mL    Physical Exam:  Gen NAD  HEENT: no JVD  Pulm: CTABL  CV: S1S2,  Abd: Soft,   Ext: - edema B/L LE, B/L foot wound evident   Neuro: Awake and alert  Skin: Warm and dry  Vascular: LUE AVF +weak bruit		    LABS/STUDIES  --------------------------------------------------------------------------------              8.2    11.79 >-----------<  239      [01-24-23 @ 06:59]              28.0     139  |  110  |  93  ----------------------------<  89      [01-24-23 @ 12:00]  4.3   |  11  |  6.48        Ca     8.0     [01-24-23 @ 12:00]      Mg     1.70     [01-24-23 @ 12:00]      Phos  6.1     [01-24-23 @ 12:00]    Creatinine Trend:  SCr 6.48 [01-24 @ 12:00]  SCr 6.47 [01-23 @ 06:02]  SCr 5.84 [01-21 @ 07:00]  SCr 5.74 [01-20 @ 05:44]  SCr 5.71 [01-19 @ 07:00]    Iron 95, TIBC <125, %sat --      [01-16-23 @ 11:00]  Ferritin 311      [01-03-23 @ 06:12]  PTH -- (Ca --)      [12-22-22 @ 06:40]   290  Vitamin D (25OH) 10.4      [01-04-23 @ 06:45]  HbA1c 5.0      [03-22-19 @ 09:03]  TSH 1.380      [09-01-22 @ 08:58]  Lipid: chol 147, TG 88, HDL 59, LDL --      [09-01-22 @ 08:58]

## 2023-01-24 NOTE — PROGRESS NOTE ADULT - ASSESSMENT
62M hx esophageal stricture, chronic pancreatitis, originally presented with n/v, esophageal stent removal in December, course c/b septic shock. Patient s/p first stage left brachiobasilic AVF on 1/5 with Dr. Oro. Vascular surgery reconsulted in setting of bilateral foot wounds and reported nonpalpable pulses. S/p RLE angio on 1/20 with PT plasty, now planning for possible pop-PT bypass    PLAN:  - Cardiac optimization noted and appreciated   - Vein mapping completed, reviewed   - Plan for Thursday repeat angiogram in OR with pedal access given small veins on duplex       C Team Surgery  #[id number]

## 2023-01-24 NOTE — PROGRESS NOTE ADULT - SUBJECTIVE AND OBJECTIVE BOX
Cardiovascular Disease Progress Note  Date of Service: 01-24-23 @ 07:19    Overnight events: No acute events overnight.  Patient denies chest pain or SOB.     Otherwise review of systems negative    Objective Findings:  T(C): 36.4 (01-24-23 @ 05:55), Max: 36.8 (01-23-23 @ 11:33)  HR: 85 (01-24-23 @ 05:55) (73 - 91)  BP: 106/76 (01-24-23 @ 05:55) (106/76 - 136/71)  RR: 17 (01-24-23 @ 05:55) (17 - 18)  SpO2: 100% (01-24-23 @ 05:55) (100% - 100%)  Wt(kg): --  Daily     Daily       Physical Exam:  Gen: NAD; Patient resting comfortably  HEENT: EOMI, Normocephalic/ atraumatic  CV: RRR, normal S1 + S2, no m/r/g  Lungs:  Normal respiratory effort; clear to auscultation bilaterally  Abd: soft, non-tender; bowel sounds present  Ext: No edema;      Telemetry: n/a     Laboratory Data:                        7.4    10.10 )-----------( 232      ( 23 Jan 2023 06:02 )             24.3     01-23    141  |  113<H>  |  96<H>  ----------------------------<  78  3.8   |  11<L>  |  6.47<H>    Ca    7.8<L>      23 Jan 2023 06:02  Phos  5.7     01-23  Mg     1.50     01-23                Inpatient Medications:  MEDICATIONS  (STANDING):  albuterol/ipratropium for Nebulization. 3 milliLiter(s) Nebulizer once  cadexomer iodine 0.9% Gel 1 Application(s) Topical daily  calcitriol   Capsule 0.5 MICROGram(s) Oral daily  epoetin mejia-epbx (RETACRIT) Injectable 66385 Unit(s) SubCutaneous <User Schedule>  folic acid 1 milliGRAM(s) Oral daily  heparin   Injectable 5000 Unit(s) SubCutaneous every 8 hours  metoclopramide 5 milliGRAM(s) Oral three times a day  multivitamin 1 Tablet(s) Oral daily  NIFEdipine XL 30 milliGRAM(s) Oral daily  pancrelipase  (CREON  6,000 Lipase Units) 1 Capsule(s) Oral three times a day with meals  pantoprazole    Tablet 40 milliGRAM(s) Oral every 12 hours  polyethylene glycol 3350 17 Gram(s) Oral two times a day  senna 2 Tablet(s) Oral at bedtime  sevelamer carbonate 800 milliGRAM(s) Oral three times a day  sodium bicarbonate 1300 milliGRAM(s) Oral three times a day  sucralfate 1 Gram(s) Oral every 6 hours  thiamine 100 milliGRAM(s) Oral daily      Assessment: 62 year old man with HTN, CKD V, hyperlipidemia and non-obstructive CAD now with chronic limb ischemia.     Plan of Care:    #Pre-operative evaluation-  Mr. Chaudhari is compensated from a heart failure standpoint.  He underwent cath earlier this month revealing non-obstructive CAD.  No cardiac objection to proceeding with vascular bypass surgery.     Over 25 minutes spent on total encounter; more than 50% of the visit was spent counseling and/or coordinating care by the attending physician.      Sunny Pineda MD [facility]  Cardiovascular Disease  [phone]

## 2023-01-24 NOTE — PROGRESS NOTE ADULT - PROBLEM SELECTOR PLAN 1
b/l heel wounds and right 2nd and 3rd toe wound.   Podiatry concern for deep infection and possible need for amputation.   wound was seen with wound care at bedside, discussed care and per their evaluation, this is slight improved from last evaluation 1 week prior.   BARRY/PVR result reviewed; mild stenosis in LLE, moderate stenosis in RLE.   Xray foot negative for signs of OM  C/w local wound care by Podiatry  S/p RLE angio by vascular on 1/20 with PT plasty, possible need for pop-PT bypass - plan for 1/26  B/L LE vein mapping-No evidence of thrombosis or significant venous wall thickening noted within the right and left great saphenous and short saphenous veins  - Appreciate cards clearance on 1/26  - F/up further Vascular recs

## 2023-01-24 NOTE — PROGRESS NOTE ADULT - PROBLEM SELECTOR PLAN 8
Septic shock due to pseudomonal PNA with lung abscess, morganella bacteremia and kelbsiella UTI.  [facility] versus possibly longer pending imaging at 4 weeks (12/23)  - CXR 12/23 - Stable RUL lesion, with new RLL pneumonia   - seen by ID  - completed 6 wks zosyn 1/6  - per d/w ID no further imaging as 6 wks abx sufficient treatment

## 2023-01-25 NOTE — PROGRESS NOTE ADULT - ASSESSMENT
62M with bilateral foot wounds.   - Patient seen and evaluated.  - Afebrile, WBC 10.70.  - Right IPJ hallux wound to subQ, fibrotic wound bed, right foot 2nd and 3rd digit medial DIPJ wound to bone with increased granulation, exposed distal phalanx to both digits, no malodor, necrotic periwound edges. Left foot heel  and 5th met base early DTI with no signs of infection.   - Bilateral foot xray: Bilateral soft tissue swelling. No tracking soft tissue gas collections, radiopaque foreign bodies, or gross radiographic evidence for osteomyelitis.  - No wound culture necessary 2/2 to no acute signs of infection.   - BARRY/PVRs: RABI 0.66, RTBI 0.3, L BARRY 0.76, L TBI 0.63 small arterial disease b/l   - Discussed the importance of offloading bilateral feet with zflows to avoid developing wounds however patient refused again, at this time.  - s/p RLE angiogram with PT plasty, planning Pop-PT bypass; appreciated.  - Vasc planning RLE repeat angio Friday 1/27, appreciated.  - Pod plan local wound care pending vascular recs.   - Discussed with attending.

## 2023-01-25 NOTE — PROGRESS NOTE ADULT - ASSESSMENT
62M hx esophageal stricture, chronic pancreatitis, originally presented with n/v, esophageal stent removal in December, course c/b septic shock. Patient s/p first stage left brachiobasilic AVF on 1/5 with Dr. Oro. Vascular surgery reconsulted in setting of bilateral foot wounds and reported nonpalpable pulses. S/p RLE angio on 1/20 with PT plasty, now planning for possible pop-PT bypass    PLAN:  - Cardiac optimization noted and appreciated   - Vein mapping completed, reviewed   - Plan for tomorrow (Thursday 1/25) repeat angiogram in OR with pedal access given small veins on duplex   - Please obtain pre op labs      C Team Surgery  #[id number]   62M hx esophageal stricture, chronic pancreatitis, originally presented with n/v, esophageal stent removal in December, course c/b septic shock. Patient s/p first stage left brachiobasilic AVF on 1/5 with Dr. rOo. Vascular surgery reconsulted in setting of bilateral foot wounds and reported nonpalpable pulses. S/p RLE angio on 1/20 with PT plasty, now planning for possible pop-PT bypass    PLAN:  - Cardiac optimization noted and appreciated   - Vein mapping completed, reviewed   - Plan for tomorrow (Friday 1/27) repeat angiogram in OR with pedal access given small veins on duplex       C Team Surgery  #[id number]   62M hx esophageal stricture, chronic pancreatitis, originally presented with n/v, esophageal stent removal in December, course c/b septic shock. Patient s/p first stage left brachiobasilic AVF on 1/5 with Dr. Oro. Vascular surgery reconsulted in setting of bilateral foot wounds and reported nonpalpable pulses. S/p RLE angio on 1/20 with PT plasty, now planning for possible pop-PT bypass    PLAN:  - Cardiac optimization noted and appreciated   - Vein mapping completed, reviewed   - Plan for (Friday 1/27) repeat angiogram in OR with pedal access given small veins on duplex   - please document medical optimization and clearance  - appreciate nephrology recommendations pre/post angio      C Team Surgery  #[id number]

## 2023-01-25 NOTE — PROGRESS NOTE ADULT - PROBLEM SELECTOR PLAN 8
Septic shock due to pseudomonal PNA with lung abscess, morganella bacteremia and kelbsiella UTI.  [facility]25[facility] versus possibly longer pending imaging at 4 weeks (12/23)  - CXR 12/23 - Stable RUL lesion, with new RLL pneumonia   - seen by ID  - completed 6 wks zosyn 1/6  - per d/w ID no further imaging as 6 wks abx sufficient treatment

## 2023-01-25 NOTE — PROGRESS NOTE ADULT - SUBJECTIVE AND OBJECTIVE BOX
[facility] DIVISION OF KIDNEY DISEASES AND HYPERTENSION -- FOLLOW UP NOTE  --------------------------------------------------------------------------------  HPI: Pt is a 62-year-old Male with Hx of esophageal stricture (S/p stenting in 5/22), Emphysema, HCV, chronic pancreatitis who initially presented to [facility] on 11/25/22 for AMS. Pt was admitted to [facility] on 11/25 for AMS 2/2 Septic shock. Pt was initially intubated for acute respiratory failure and required pressors for hypotension. Pt's hospital course was complicated by YUNIEL/ATN requiring HD. Pt was extubated and was transferred to [facility] for esophageal stent removal by Dr. Erickson. Initial labs on this admission concerning for elevated Scr. Nephrology team following for YUNIEL on CKD.     On review of [facility], it was noted that Scr was elevated at 2.20 in September 2022. Scr on admission was significantly elevated at 8.94 on 11/25/22. Pt received 1st HD session on 11/26/22. Last HD session was done on 12/1/22. HD catheter was removed as kidney function was recovering. Scr was elevated at 5.28 on transfer from [facility] on 12/15/22. Scr remains elevated/stable at 5-6 range. Scr remains elevated/stable at 6.88 today.     Pt seen and evaluated bedside this morning. Pt reports feeling well, endorses , eating breakfast. no complaints. Denies any chest pain, shortness of breath, or dizziness.    PAST HISTORY  --------------------------------------------------------------------------------  No significant changes to PMH, PSH, FHx, SHx, unless otherwise noted    ALLERGIES & MEDICATIONS  --------------------------------------------------------------------------------  Allergies    Tylenol (Other)    Intolerances    Standing Inpatient Medications  albuterol/ipratropium for Nebulization. 3 milliLiter(s) Nebulizer once  cadexomer iodine 0.9% Gel 1 Application(s) Topical daily  calcitriol   Capsule 0.5 MICROGram(s) Oral daily  epoetin mejia-epbx (RETACRIT) Injectable 41660 Unit(s) SubCutaneous <User Schedule>  folic acid 1 milliGRAM(s) Oral daily  heparin   Injectable 5000 Unit(s) SubCutaneous every 8 hours  metoclopramide 5 milliGRAM(s) Oral three times a day  multivitamin 1 Tablet(s) Oral daily  NIFEdipine XL 30 milliGRAM(s) Oral daily  pancrelipase  (CREON  6,000 Lipase Units) 1 Capsule(s) Oral three times a day with meals  pantoprazole    Tablet 40 milliGRAM(s) Oral every 12 hours  polyethylene glycol 3350 17 Gram(s) Oral two times a day  senna 2 Tablet(s) Oral at bedtime  sevelamer carbonate 800 milliGRAM(s) Oral three times a day  sodium bicarbonate 1625 milliGRAM(s) Oral three times a day  sucralfate 1 Gram(s) Oral every 6 hours  thiamine 100 milliGRAM(s) Oral daily    PRN Inpatient Medications  bisacodyl 5 milliGRAM(s) Oral every 12 hours PRN  ondansetron Injectable 4 milliGRAM(s) IV Push every 6 hours PRN  oxyCODONE    IR 10 milliGRAM(s) Oral every 4 hours PRN  tiZANidine 2 milliGRAM(s) Oral every 8 hours PRN    REVIEW OF SYSTEMS  --------------------------------------------------------------------------------  Gen: No fevers   Respiratory: No dyspnea  CV: No chest pain  GI: No abdominal pain  : No dysuria  MSK: No  edema  Neuro: no dizziness   All other systems were reviewed and are negative, except as noted.    VITALS/PHYSICAL EXAM  --------------------------------------------------------------------------------  T(C): 36.7 (01-25-23 @ 10:29), Max: 36.8 (01-24-23 @ 18:00)  HR: 81 (01-25-23 @ 10:29) (81 - 92)  BP: 109/58 (01-25-23 @ 10:29) (109/58 - 135/63)  RR: 18 (01-25-23 @ 10:29) (17 - 18)  SpO2: 100% (01-25-23 @ 10:29) (100% - 100%)  Wt(kg): --    01-24-23 @ 07:01  -  01-25-23 @ 07:00  --------------------------------------------------------  IN: 0 mL / OUT: 700 mL / NET: -700 mL    Physical Exam:  Gen NAD  HEENT: no JVD  Pulm: CTABL  CV: S1S2,  Abd: Soft,   Ext: - edema B/L LE, B/L foot wound evident   Neuro: Awake and alert  Skin: Warm and dry  Vascular: LUE AVF +weak bruit		    LABS/STUDIES  --------------------------------------------------------------------------------              7.6    10.70 >-----------<  242      [01-25-23 @ 06:03]              24.3     138  |  110  |  97  ----------------------------<  95      [01-25-23 @ 06:03]  4.3   |  11  |  6.88        Ca     8.1     [01-25-23 @ 06:03]      Mg     1.60     [01-25-23 @ 06:03]      Phos  6.0     [01-25-23 @ 06:03]    Creatinine Trend:  SCr 6.88 [01-25 @ 06:03]  SCr 6.48 [01-24 @ 12:00]  SCr 6.47 [01-23 @ 06:02]  SCr 5.84 [01-21 @ 07:00]  SCr 5.74 [01-20 @ 05:44]    Iron 95, TIBC <125, %sat --      [01-16-23 @ 11:00]  Ferritin 311      [01-03-23 @ 06:12]  PTH -- (Ca --)      [12-22-22 @ 06:40]   290  Vitamin D (25OH) 10.4      [01-04-23 @ 06:45]  HbA1c 5.0      [03-22-19 @ 09:03]  TSH 1.380      [09-01-22 @ 08:58]  Lipid: chol 147, TG 88, HDL 59, LDL --      [09-01-22 @ 08:58]

## 2023-01-25 NOTE — PROGRESS NOTE ADULT - SUBJECTIVE AND OBJECTIVE BOX
Vascular Surgery Progress Note     Subjective/24hour Events: No acute events overnight.     Vital Signs:  Vital Signs Last 24 Hrs  T(C): 36.6 (25 Jan 2023 05:08), Max: 36.8 (24 Jan 2023 18:00)  T(F): 97.9 (25 Jan 2023 05:08), Max: 98.3 (24 Jan 2023 18:00)  HR: 85 (25 Jan 2023 05:08) (84 - 92)  BP: 135/63 (25 Jan 2023 05:08) (127/57 - 135/63)  BP(mean): --  RR: 17 (25 Jan 2023 05:08) (16 - 17)  SpO2: 100% (25 Jan 2023 05:08) (100% - 100%)    Parameters below as of 25 Jan 2023 05:08  Patient On (Oxygen Delivery Method): room air            I&O's Detail    24 Jan 2023 07:01  -  25 Jan 2023 07:00  --------------------------------------------------------  IN:  Total IN: 0 mL    OUT:    Voided (mL): 700 mL  Total OUT: 700 mL    Total NET: -700 mL            MEDICATIONS  (STANDING):  albuterol/ipratropium for Nebulization. 3 milliLiter(s) Nebulizer once  cadexomer iodine 0.9% Gel 1 Application(s) Topical daily  calcitriol   Capsule 0.5 MICROGram(s) Oral daily  epoetin mejia-epbx (RETACRIT) Injectable 55723 Unit(s) SubCutaneous <User Schedule>  folic acid 1 milliGRAM(s) Oral daily  heparin   Injectable 5000 Unit(s) SubCutaneous every 8 hours  metoclopramide 5 milliGRAM(s) Oral three times a day  multivitamin 1 Tablet(s) Oral daily  NIFEdipine XL 30 milliGRAM(s) Oral daily  pancrelipase  (CREON  6,000 Lipase Units) 1 Capsule(s) Oral three times a day with meals  pantoprazole    Tablet 40 milliGRAM(s) Oral every 12 hours  polyethylene glycol 3350 17 Gram(s) Oral two times a day  senna 2 Tablet(s) Oral at bedtime  sevelamer carbonate 800 milliGRAM(s) Oral three times a day  sodium bicarbonate 1625 milliGRAM(s) Oral three times a day  sucralfate 1 Gram(s) Oral every 6 hours  thiamine 100 milliGRAM(s) Oral daily    MEDICATIONS  (PRN):  bisacodyl 5 milliGRAM(s) Oral every 12 hours PRN Constipation  ondansetron Injectable 4 milliGRAM(s) IV Push every 6 hours PRN Nausea and/or Vomiting  oxyCODONE    IR 10 milliGRAM(s) Oral every 4 hours PRN Moderate Pain (4 - 6)-Severe pain (7-10)  tiZANidine 2 milliGRAM(s) Oral every 8 hours PRN Muscle Cramps        Physical Exam:  Gen: resting in bed comfortably in NAD  Chest: no increased WOB, regular inspiratory effort   Abdomen: Soft, nontender, nondistended with no rebound tenderness or guarding.   Vascular: L groin soft and flat, LE +DP/PT signals   Neuro: awake, alert    Labs:    01-25    138  |  110<H>  |  97<H>  ----------------------------<  95  4.3   |  11<L>  |  6.88<H>    Ca    8.1<L>      25 Jan 2023 06:03  Phos  6.0     01-25  Mg     1.60     01-25                              7.6    10.70 )-----------( 242      ( 25 Jan 2023 06:03 )             24.3         CAPILLARY BLOOD GLUCOSE              Imaging:

## 2023-01-25 NOTE — PROGRESS NOTE ADULT - SUBJECTIVE AND OBJECTIVE BOX
[facility]  Division of Hospital Medicine  Vera Burgess MD  Pager: [phone]      Patient is a 62y old  Male who presents with a chief complaint of intractable N/V , esophageal stent removal (25 Jan 2023 11:53)      SUBJECTIVE / OVERNIGHT EVENTS: Patient examined at bedside. No medical concerns. Feeling okay.   ADDITIONAL REVIEW OF SYSTEMS:    MEDICATIONS  (STANDING):  albuterol/ipratropium for Nebulization. 3 milliLiter(s) Nebulizer once  cadexomer iodine 0.9% Gel 1 Application(s) Topical daily  calcitriol   Capsule 0.5 MICROGram(s) Oral daily  epoetin mejia-epbx (RETACRIT) Injectable 52117 Unit(s) SubCutaneous <User Schedule>  folic acid 1 milliGRAM(s) Oral daily  heparin   Injectable 5000 Unit(s) SubCutaneous every 8 hours  metoclopramide 5 milliGRAM(s) Oral three times a day  multivitamin 1 Tablet(s) Oral daily  NIFEdipine XL 30 milliGRAM(s) Oral daily  pancrelipase  (CREON  6,000 Lipase Units) 1 Capsule(s) Oral three times a day with meals  pantoprazole    Tablet 40 milliGRAM(s) Oral every 12 hours  polyethylene glycol 3350 17 Gram(s) Oral two times a day  senna 2 Tablet(s) Oral at bedtime  sevelamer carbonate 800 milliGRAM(s) Oral three times a day  sodium bicarbonate 1625 milliGRAM(s) Oral three times a day  sucralfate 1 Gram(s) Oral every 6 hours  thiamine 100 milliGRAM(s) Oral daily    MEDICATIONS  (PRN):  bisacodyl 5 milliGRAM(s) Oral every 12 hours PRN Constipation  ondansetron Injectable 4 milliGRAM(s) IV Push every 6 hours PRN Nausea and/or Vomiting  oxyCODONE    IR 10 milliGRAM(s) Oral every 4 hours PRN Moderate Pain (4 - 6)-Severe pain (7-10)  tiZANidine 2 milliGRAM(s) Oral every 8 hours PRN Muscle Cramps      CAPILLARY BLOOD GLUCOSE        I&O's Summary    24 Jan 2023 07:01  -  25 Jan 2023 07:00  --------------------------------------------------------  IN: 0 mL / OUT: 700 mL / NET: -700 mL        PHYSICAL EXAM:  Vital Signs Last 24 Hrs  T(C): 36.7 (25 Jan 2023 10:29), Max: 36.8 (24 Jan 2023 18:00)  T(F): 98 (25 Jan 2023 10:29), Max: 98.3 (24 Jan 2023 18:00)  HR: 81 (25 Jan 2023 10:29) (81 - 92)  BP: 109/58 (25 Jan 2023 10:29) (109/58 - 135/63)  BP(mean): --  RR: 18 (25 Jan 2023 10:29) (17 - 18)  SpO2: 100% (25 Jan 2023 10:29) (100% - 100%)    Parameters below as of 25 Jan 2023 10:29  Patient On (Oxygen Delivery Method): room air      General:  Well appearing, NAD, cachetic  HEENT:  Nonicteric, PERRLA  CV:  RRR, no murmur, no JVD  Lungs:  CTA B/L, no wheezes, rales, rhonchi  Abdomen:  Soft, non-tender, no distended, positive BS  Extremities:  2+ pulses, no c/c, B/L feet covered with dressing  Skin:  Warm and dry, no rashes  :  No chen  Neuro:  AAOx3, non-focal, CN II-XII grossly intact  No Restraints    LABS:                        7.6    10.70 )-----------( 242      ( 25 Jan 2023 06:03 )             24.3     01-25    138  |  110<H>  |  97<H>  ----------------------------<  95  4.3   |  11<L>  |  6.88<H>    Ca    8.1<L>      25 Jan 2023 06:03  Phos  6.0     01-25  Mg     1.60     01-25                  RADIOLOGY & ADDITIONAL TESTS:  Results Reviewed:   Imaging Personally Reviewed:  Electrocardiogram Personally Reviewed:    COORDINATION OF CARE:  Care Discussed with Consultants/Other Providers [Y/N]:  Prior or Outpatient Records Reviewed [Y/N]:

## 2023-01-25 NOTE — PROGRESS NOTE ADULT - PROBLEM SELECTOR PLAN 1
Pt with YUNIEL on CKD likely multifactorial in the setting of recent COVID infection, poor oral intake. Scr was elevated at 2.20 in September 2022. Pt was recently admitted at [facility]. Scr on admission was significantly elevated at 8.94 on 11/25/22. Pt received 1st HD session on 11/26/22. Last HD session was done on 12/1/22. HD catheter was removed as kidney function was recovering. Scr was elevated at 5.28 on transfer from [facility] on 12/15/22. Scr remained elevated/stable at 5.40 on 1/5/23. Of note kidney sonogram had discrepant size renal artery duplex showing HESHAM but BP well controlled. Discussed with Dr. Dukes (12/21) who knows the patient very well and has seen him inpatient and outpatient over the last several years. As per Dr. Dukes the patient's baseline creatinine is 3 and has advanced CKD and is nearing ESKD. Pt underwent cardiac cath on 1/4/23 , tolerated procedure well. Underwent LUE AVF creation on 1/5/23. Last Scr is elevated/stable at 6.88 today (1/25/23). Pt non uremic, non oliguric. No urgent indication for HD. Recommend vascular surgery evaluation of AVF. Monitor labs and urine output. Avoid any potential nephrotoxins. Dose medications as per eGFR.

## 2023-01-25 NOTE — PROGRESS NOTE ADULT - PROBLEM SELECTOR PLAN 4
Pt. with acidosis in setting of renal failure. Serum CO2 low at 11 today. Bicitra was discontinued as pt refuses to take. Continue with sodium bicarb tabs 1300 gm PO TID. Recommend IV bicarb (5W with 150mEq sodium bicarbonate) @ 100cc/hr for total of 1 L today. Consider IV sodium bicarbonate if pt refuses PO. Monitor SCO2.    If you have any questions, please feel free to contact me  Jeff Pete  Nephrology Fellow  [phone]/ [employer] Teams(Preferred)  (After 5pm or on weekends please page the on-call fellow).

## 2023-01-25 NOTE — PROGRESS NOTE ADULT - SUBJECTIVE AND OBJECTIVE BOX
Patient is a 62y old  Male who presents with a chief complaint of intractable N/V , esophageal stent removal (25 Jan 2023 08:01)       INTERVAL HPI/OVERNIGHT EVENTS:  Patient seen and evaluated at bedside.  Pt is resting comfortable in NAD. Denies N/V/F/C.    Allergies    Tylenol (Other)    Intolerances        Vital Signs Last 24 Hrs  T(C): 36.7 (25 Jan 2023 10:29), Max: 36.8 (24 Jan 2023 18:00)  T(F): 98 (25 Jan 2023 10:29), Max: 98.3 (24 Jan 2023 18:00)  HR: 81 (25 Jan 2023 10:29) (81 - 92)  BP: 109/58 (25 Jan 2023 10:29) (109/58 - 135/63)  BP(mean): --  RR: 18 (25 Jan 2023 10:29) (17 - 18)  SpO2: 100% (25 Jan 2023 10:29) (100% - 100%)    Parameters below as of 25 Jan 2023 10:29  Patient On (Oxygen Delivery Method): room air        LABS:                        7.6    10.70 )-----------( 242      ( 25 Jan 2023 06:03 )             24.3     01-25    138  |  110<H>  |  97<H>  ----------------------------<  95  4.3   |  11<L>  |  6.88<H>    Ca    8.1<L>      25 Jan 2023 06:03  Phos  6.0     01-25  Mg     1.60     01-25          CAPILLARY BLOOD GLUCOSE          Lower Extremity Physical Exam:  Vascular: DP/PT 0/4, B/L, CFT >3 seconds B/L, Temperature gradient warm to cool, B/L.   Neuro: Epicritic sensation intact to the level of digits, B/L.  Musculoskeletal/Ortho: unremarkable   Skin: Right IPJ hallux wound to subQ, fibrotic wound bed, right foot 2nd and 3rd digit medial DIPJ wound to bone with increased granulation, exposed distal phalanx to both digits, no malodor, necrotic periwound edges. Left foot heel  and 5th met base early DTI with no signs of infection.     RADIOLOGY & ADDITIONAL TESTS:

## 2023-01-25 NOTE — PROGRESS NOTE ADULT - PROBLEM SELECTOR PLAN 4
S/p HD X 3  in ICU in [facility], Cr plateaued at 5.7-6, off HD now; per renal plan for AVF creation on this admission as per d/w OP nephrologist baseline Cr is 3-CKD stage 4-5 and nearing ESRD  - [facility] renal duplex with severe stenosis of the left renal artery; vascular consulted, no plan for intervention for HESHAM  - sevelamer 800 mg TID, bicarb 1300 mg TID; calcitriol increased to 0.5 for secondary hyperparathyroidism per renal  - s/p AVF w/ vascular on 1/5 to the L arm, L arm precautions  - avoid nephrotoxic  - renally dose meds  - trend Cr and electrolytes closely  - discussed with nephrology patient is stable for d/c to [facility] from nephrology perspective, will d/c Bicitra as patient is not tolerating it  - Per Renal fistula examined with faint bruit, will have vascular f/up on patient

## 2023-01-25 NOTE — PROGRESS NOTE ADULT - ATTENDING COMMENTS
YUNIEL on CKD 5    No uremic symptoms currently, follow up with vascular regarding access  Will monitor volume status and labs

## 2023-01-25 NOTE — PROGRESS NOTE ADULT - ASSESSMENT
62M EtoH misuse c/b chronic pancreatitis, HCV s/p treatment (SVR), emphysema, benign esophageal stricture (s/p stent on 4/2022) admitted at [facility] w/ septic shock septic shock due to pseudomonal PNA, lung abscess, Morganella bacteremia and Klebsiella UTI (now s/p 6 wks zosyn ended 1/6) w/ hospital course c/b YUNIEL on CKD requiring HD and MICU stay, transferred here for intractable nausea/vomiting and esophageal stent removal, now s/p removal w/ EGD sig for esophagitis/gastric ulcer. Hospital course now c/b acute on chronic anemia required multiple transfusion. Now patient is noted to have skin breakdown in Right toes concerning for underline infection secondary to vascular insufficiency. Plan for pop-PT bypass surgery on 1/26.

## 2023-01-26 NOTE — PROGRESS NOTE ADULT - ASSESSMENT
62M hx esophageal stricture, chronic pancreatitis, originally presented with n/v, esophageal stent removal in December, course c/b septic shock. Patient s/p first stage left brachiobasilic AVF on 1/5 with Dr. Oro. Vascular surgery reconsulted in setting of bilateral foot wounds and reported nonpalpable pulses. S/p RLE angio on 1/20 with PT plasty, now planning for possible pop-PT bypass    PLAN:  - Cardiac optimization noted and appreciated   - Vein mapping completed, reviewed   - Plan for (Friday 1/27) repeat angiogram in OR with pedal access given small veins on duplex   - Consent obtained and placed in chart   - Please pre-op patient tonight - AM labs, recent COVID, active T+S, NPO past midnight   - Please document medical optimization and clearance  - Appreciate nephrology recommendations pre/post angio      C Team Surgery  #[id number]  62M hx esophageal stricture, chronic pancreatitis, originally presented with n/v, esophageal stent removal in December, course c/b septic shock. Patient s/p first stage left brachiobasilic AVF on 1/5 with Dr. Oro. Vascular surgery reconsulted in setting of bilateral foot wounds and reported nonpalpable pulses. S/p RLE angio on 1/20 with PT plasty, now planning for possible pop-PT bypass    PLAN:  - Cardiac optimization noted and appreciated   - Vein mapping completed, reviewed   - Plan for (Friday 1/27) repeat angiogram in OR with pedal access given small veins on duplex   - Consent obtained and placed in chart   - Please pre-op patient tonight - AM labs, recent COVID, active T+S, NPO past midnight   - Please document medical optimization and clearance  - Appreciate nephrology recommendations pre/post angio, pt. Cr currently 6.8   - Fistula evaluated, no current intervention       C Team Surgery  #[id number]

## 2023-01-26 NOTE — CONSULT NOTE ADULT - ASSESSMENT
Interventional Radiology    Evaluate for Procedure: central venous access    HPI: 62y Male with PMHx chronic pancreatitis 2/2 ETOH abuse, emphysema, benign esophageal stricture s/p stent, [facility] with septic shock due to due to pneumonia/lung abscess, bacteremia, UTI. Course c/b YUNIEL on CKD requiring HD and MICU, transferred to [facility] for nausea/vomiting and esophageal stent removal, now s/p removal with EGD significant for esophagitis/gastric ulcer. Course c/b acute on chronic anemia requiring multiple transfusions. Now patient with skin breakdown of the toes concerning for infection 2/2 vascular insult. Patient also with right upper extremity swelling, found on US to have acute DVT in the brachial vein. Floor nurses and IV nurse attempted to obtain IV access in the right arm but were unable. Left arm with AVF. IR consulted for central venous access.    Allergies: Tylenol (Other)    Medications (Abx/Cardiac/Anticoagulation/Blood Products)    heparin   Injectable: 5000 Unit(s) SubCutaneous (01-26 @ 14:34)  NIFEdipine XL: 30 milliGRAM(s) Oral (01-26 @ 06:47)    Data:    T(C): 36.4  HR: 90  BP: 136/75  RR: 16  SpO2: 100%    -WBC 9.30 / HgB 8.5 / Hct 27.2 / Plt 262  -Na 139 / Cl 112 /  / Glucose 93  -K 3.9 / CO2 10 / Cr 7.03  -ALT -- / Alk Phos -- / T.Bili --  -INR 1.02 / PTT 29.7      Radiology: reviewed    Assessment/Plan: 62M with complicated past medical history and hospital course, now with right upper extremity swelling and DVT with inability to obtain peripheral line. Left upper extremity has a AVF. IR consulted for central venous access as patient needs IV heparin and bicarb.    -- IR will plan to perform tunneled catheter placement 1/27/23  -- NPO at midnight   -- hold a.m. anticoagulation  -- please complete IR pre-procedure note  -- please place IR procedure request order under Dr. Marques    --  Joellen Bone MD  IR Pager [phone] Interventional Radiology    Evaluate for Procedure: central venous access    HPI: 62y Male with PMHx chronic pancreatitis 2/2 ETOH abuse, emphysema, benign esophageal stricture s/p stent, [facility] admitted [facility] with septic shock due to due to pneumonia/lung abscess, bacteremia, UTI. Course c/b YUNIEL on CKD requiring HD and MICU, transferred to [facility] for nausea/vomiting and esophageal stent removal, now s/p removal with EGD significant for esophagitis/gastric ulcer. Course c/b acute on chronic anemia requiring multiple transfusions. Now patient with skin breakdown of the toes concerning for infection 2/2 vascular insult. Patient also with right upper extremity swelling, found on US to have acute DVT in the brachial vein. Floor nurses and IV nurse attempted to obtain IV access in the right arm but were unable. Left arm with AVF. IR consulted for central venous access.    Allergies: Tylenol (Other)    Medications (Abx/Cardiac/Anticoagulation/Blood Products)    heparin   Injectable: 5000 Unit(s) SubCutaneous (01-26 @ 14:34)  NIFEdipine XL: 30 milliGRAM(s) Oral (01-26 @ 06:47)    Data:    T(C): 36.4  HR: 90  BP: 136/75  RR: 16  SpO2: 100%    -WBC 9.30 / HgB 8.5 / Hct 27.2 / Plt 262  -Na 139 / Cl 112 /  / Glucose 93  -K 3.9 / CO2 10 / Cr 7.03  -ALT -- / Alk Phos -- / T.Bili --  -INR 1.02 / PTT 29.7    Radiology: reviewed    Assessment/Plan: 62M with complicated past medical history and hospital course, now with right upper extremity swelling and DVT with inability to obtain peripheral line. Left upper extremity has a AVF. IR consulted for central venous access as patient needs IV heparin and bicarb.    -- IR will plan to perform tunneled central venous catheter placement 1/27/23  -- NPO at midnight   -- hold a.m. anticoagulation  -- Early AM labs (CBC, BMP, Coags)  -- please complete IR pre-procedure note  -- please place IR procedure request order under Dr. Marques    --  Joellen Bone MD  IR Pager [phone]

## 2023-01-26 NOTE — PROGRESS NOTE ADULT - ATTENDING COMMENTS
YUNIEL on CKD  Hypocalemia    Will plan to initiate HD given worsening renal function, please have catheter placed

## 2023-01-26 NOTE — PROGRESS NOTE ADULT - PROBLEM SELECTOR PLAN 1
Pt with YUNIEL on CKD likely multifactorial in the setting of recent COVID infection, poor oral intake. Scr was elevated at 2.20 in September 2022. Pt was recently admitted at [facility]. Scr on admission was significantly elevated at 8.94 on 11/25/22. Pt received 1st HD session on 11/26/22. Last HD session was done on 12/1/22. HD catheter was removed as kidney function was recovering. Scr was elevated at 5.28 on transfer from [facility] on 12/15/22. Scr remained elevated/stable at 5.40 on 1/5/23. Of note kidney sonogram had discrepant size renal artery duplex showing HESHAM but BP well controlled. Discussed with Dr. Dukes (12/21) who knows the patient very well and has seen him inpatient and outpatient over the last several years. As per Dr. Dukes the patient's baseline creatinine is 3 and has advanced CKD and is nearing ESKD. Pt underwent cardiac cath on 1/4/23 , tolerated procedure well. Underwent LUE AVF creation on 1/5/23. Last Scr is elevated/stable at 7.03 today (1/26/23). Pt non uremic, non oliguric for now. No urgent indication for HD. Pt is planned for OR tomorrow for possible pop-PT bypass. Discussed with patient and primary team that the pt will potentially need dialysis post procedure. Pt agreed. Start IV bicarb (D5W with 150mEq sodium bicarbonate) @ 100cc/hr for total of 1 L today. Monitor labs and urine output. Avoid any potential nephrotoxins. Dose medications as per eGFR.

## 2023-01-26 NOTE — PROGRESS NOTE ADULT - PROBLEM SELECTOR PLAN 4
Pt. with acidosis in setting of renal failure. Serum CO2 low at 11 today. Bicitra was discontinued as pt refuses to take. Continue with sodium bicarb tabs 1300 gm PO TID. IVF as outlined above. Monitor SCO2.    If you have any questions, please feel free to contact me  Jeff Pete  Nephrology Fellow  [phone]/ [employer] Teams(Preferred)  (After 5pm or on weekends please page the on-call fellow)

## 2023-01-26 NOTE — PROGRESS NOTE ADULT - PROBLEM SELECTOR PLAN 8
Septic shock due to pseudomonal PNA with lung abscess, morganella bacteremia and kelbsiella UTI.  Was on Zosyn since 11/25, [facility] antibiotics versus possibly longer pending imaging at 4 weeks (12/23)  - CXR 12/23 - Stable RUL lesion, with new RLL pneumonia   - seen by ID  - completed 6 wks zosyn 1/6  - per d/w ID no further imaging as 6 wks abx sufficient treatment

## 2023-01-26 NOTE — PROGRESS NOTE ADULT - PROBLEM SELECTOR PLAN 12
BP slightly elevated, intermittently refusing BP meds   - renal dopplers show left sided HESHAM 60-99%, vascular not recommending intervention   - c/w Nifedipine 30mg QD      # DVT PPx  - Heparin SQ  PT --> [facility], pending insurance authorization BP slightly elevated, intermittently refusing BP meds   - renal dopplers show left sided HESHAM 60-99%, vascular not recommending intervention   - c/w Nifedipine 30mg QD    #Acute DVT in UE  - Awaiting read from ultrasound  - Once IV access obtained, can start hep. gtt. Defer lovenox and DOAC at this time, given OR planning. Can start DOAC after no further plans for intervention   - Pt with limited access points, will likely plan for IV line to be placed in the OR vs. IR placed line   # DVT PPx  - Heparin SQ  PT --> [facility], pending insurance authorization

## 2023-01-26 NOTE — PROGRESS NOTE ADULT - ASSESSMENT
62M EtoH misuse c/b chronic pancreatitis, HCV s/p treatment (SVR), emphysema, benign esophageal stricture (s/p stent on 4/2022) admitted at [facility] w/ septic shock septic shock due to pseudomonal PNA, lung abscess, Morganella bacteremia and Klebsiella UTI (now s/p 6 wks zosyn ended 1/6) w/ hospital course c/b YUNIEL on CKD requiring HD and MICU stay, transferred here for intractable nausea/vomiting and esophageal stent removal, now s/p removal w/ EGD sig for esophagitis/gastric ulcer. Hospital course now c/b acute on chronic anemia required multiple transfusion. Now patient is noted to have skin breakdown in Right toes concerning for underline infection secondary to vascular insufficiency. Plan for repeat angiogram, and potential repeat  pop-PT bypass surgery.

## 2023-01-26 NOTE — CHART NOTE - NSCHARTNOTEFT_GEN_A_CORE
Notified by RN that patients right arm with notable swelling. Patient seen and examined at bedside. Patient endorses mild pain to forearm. On exam, swelling noted from forearm to middle upper arm. Arrow present in upper arm. Radial pulse present, no pallor, warm to touch, no erythema. Arrow difficult to flush. Will remove arrow and elevated arm, apply compress. Will order ultrasound for further evaluation.

## 2023-01-26 NOTE — PROGRESS NOTE ADULT - SUBJECTIVE AND OBJECTIVE BOX
[facility] DIVISION OF KIDNEY DISEASES AND HYPERTENSION -- FOLLOW UP NOTE  --------------------------------------------------------------------------------  HPI: Pt is a 62-year-old Male with Hx of esophageal stricture (S/p stenting in 5/22), Emphysema, HCV, chronic pancreatitis who initially presented to [facility] on 11/25/22 for AMS. Pt was admitted to [facility] on 11/25 for AMS 2/2 Septic shock. Pt was initially intubated for acute respiratory failure and required pressors for hypotension. Pt's hospital course was complicated by YUNIEL/ATN requiring HD. Pt was extubated and was transferred to [facility] for esophageal stent removal by Dr. Erickson. Initial labs on this admission concerning for elevated Scr. Nephrology team following for YUNIEL on CKD.     On review of [facility], it was noted that Scr was elevated at 2.20 in September 2022. Scr on admission was significantly elevated at 8.94 on 11/25/22. Pt received 1st HD session on 11/26/22. Last HD session was done on 12/1/22. HD catheter was removed as kidney function was recovering. Scr was elevated at 5.28 on transfer from [facility] on 12/15/22. Scr remains elevated/stable at 5-6 range. Scr remains elevated/stable at 7.03 today.     Pt seen and evaluated bedside this morning. Pt reports feeling well, endorses, eating breakfast. no complaints. Denies any chest pain, shortness of breath, or dizziness.    PAST HISTORY  --------------------------------------------------------------------------------  No significant changes to PMH, PSH, FHx, SHx, unless otherwise noted    ALLERGIES & MEDICATIONS  --------------------------------------------------------------------------------  Allergies    Tylenol (Other)    Intolerances    Standing Inpatient Medications  albuterol/ipratropium for Nebulization. 3 milliLiter(s) Nebulizer once  cadexomer iodine 0.9% Gel 1 Application(s) Topical daily  calcitriol   Capsule 0.5 MICROGram(s) Oral daily  epoetin mejia-epbx (RETACRIT) Injectable 44697 Unit(s) SubCutaneous <User Schedule>  folic acid 1 milliGRAM(s) Oral daily  heparin   Injectable 5000 Unit(s) SubCutaneous every 8 hours  metoclopramide 5 milliGRAM(s) Oral three times a day  multivitamin 1 Tablet(s) Oral daily  NIFEdipine XL 30 milliGRAM(s) Oral daily  pancrelipase  (CREON  6,000 Lipase Units) 1 Capsule(s) Oral three times a day with meals  pantoprazole    Tablet 40 milliGRAM(s) Oral every 12 hours  polyethylene glycol 3350 17 Gram(s) Oral two times a day  senna 2 Tablet(s) Oral at bedtime  sevelamer carbonate 800 milliGRAM(s) Oral three times a day  sodium bicarbonate 1625 milliGRAM(s) Oral three times a day  sucralfate 1 Gram(s) Oral every 6 hours  thiamine 100 milliGRAM(s) Oral daily    PRN Inpatient Medications  bisacodyl 5 milliGRAM(s) Oral every 12 hours PRN  ondansetron Injectable 4 milliGRAM(s) IV Push every 6 hours PRN  oxyCODONE    IR 10 milliGRAM(s) Oral every 4 hours PRN  tiZANidine 2 milliGRAM(s) Oral every 8 hours PRN    REVIEW OF SYSTEMS  --------------------------------------------------------------------------------    All other systems were reviewed and are negative, except as noted.    VITALS/PHYSICAL EXAM  --------------------------------------------------------------------------------  T(C): 36.4 (01-26-23 @ 09:45), Max: 36.7 (01-25-23 @ 19:09)  HR: 74 (01-26-23 @ 09:45) (74 - 98)  BP: 121/59 (01-26-23 @ 09:45) (112/73 - 161/74)  RR: 17 (01-26-23 @ 09:45) (16 - 18)  SpO2: 100% (01-26-23 @ 09:45) (99% - 100%)  Wt(kg): --    01-25-23 @ 07:01  -  01-26-23 @ 07:00  --------------------------------------------------------  IN: 1070 mL / OUT: 900 mL / NET: 170 mL    Physical Exam:  	Gen: NAD  	HEENT: MMM  	Pulm: CTA B/L  	CV: S1S2  	Abd: Soft, +BS   	Ext: No LE edema B/L  	Neuro: Awake  	Skin: Warm and dry  	Vascular access:    LABS/STUDIES  --------------------------------------------------------------------------------              8.5    9.30  >-----------<  262      [01-26-23 @ 06:25]              27.2     139  |  112  |  104  ----------------------------<  93      [01-26-23 @ 06:25]  3.9   |  10  |  7.03        Ca     8.1     [01-26-23 @ 06:25]      Mg     1.60     [01-26-23 @ 06:25]      Phos  6.2     [01-26-23 @ 06:25]    Creatinine Trend:  SCr 7.03 [01-26 @ 06:25]  SCr 6.88 [01-25 @ 06:03]  SCr 6.48 [01-24 @ 12:00]  SCr 6.47 [01-23 @ 06:02]  SCr 5.84 [01-21 @ 07:00]    Iron 95, TIBC <125, %sat --      [01-16-23 @ 11:00]  Ferritin 311      [01-03-23 @ 06:12]  PTH -- (Ca --)      [12-22-22 @ 06:40]   290  Vitamin D (25OH) 10.4      [01-04-23 @ 06:45]  HbA1c 5.0      [03-22-19 @ 09:03]  TSH 1.380      [09-01-22 @ 08:58]  Lipid: chol 147, TG 88, HDL 59, LDL --      [09-01-22 @ 08:58] [facility] DIVISION OF KIDNEY DISEASES AND HYPERTENSION -- FOLLOW UP NOTE  --------------------------------------------------------------------------------  HPI: Pt is a 62-year-old Male with Hx of esophageal stricture (S/p stenting in 5/22), Emphysema, HCV, chronic pancreatitis who initially presented to [facility] on 11/25/22 for AMS. Pt was admitted to [facility] on 11/25 for AMS 2/2 Septic shock. Pt was initially intubated for acute respiratory failure and required pressors for hypotension. Pt's hospital course was complicated by YUNIEL/ATN requiring HD. Pt was extubated and was transferred to [facility] for esophageal stent removal by Dr. Erickson. Initial labs on this admission concerning for elevated Scr. Nephrology team following for YUNIEL on CKD.     On review of [facility], it was noted that Scr was elevated at 2.20 in September 2022. Scr on admission was significantly elevated at 8.94 on 11/25/22. Pt received 1st HD session on 11/26/22. Last HD session was done on 12/1/22. HD catheter was removed as kidney function was recovering. Scr was elevated at 5.28 on transfer from [facility] on 12/15/22. Scr remains elevated/stable at 5-6 range. Scr remains elevated/stable at 7.03 today.     Pt seen and evaluated bedside this morning. Pt reports feeling well, endorses, eating breakfast. no complaints. Denies any chest pain, shortness of breath, or dizziness.    PAST HISTORY  --------------------------------------------------------------------------------  No significant changes to PMH, PSH, FHx, SHx, unless otherwise noted    ALLERGIES & MEDICATIONS  --------------------------------------------------------------------------------  Allergies    Tylenol (Other)    Intolerances    Standing Inpatient Medications  albuterol/ipratropium for Nebulization. 3 milliLiter(s) Nebulizer once  cadexomer iodine 0.9% Gel 1 Application(s) Topical daily  calcitriol   Capsule 0.5 MICROGram(s) Oral daily  epoetin mejia-epbx (RETACRIT) Injectable 77019 Unit(s) SubCutaneous <User Schedule>  folic acid 1 milliGRAM(s) Oral daily  heparin   Injectable 5000 Unit(s) SubCutaneous every 8 hours  metoclopramide 5 milliGRAM(s) Oral three times a day  multivitamin 1 Tablet(s) Oral daily  NIFEdipine XL 30 milliGRAM(s) Oral daily  pancrelipase  (CREON  6,000 Lipase Units) 1 Capsule(s) Oral three times a day with meals  pantoprazole    Tablet 40 milliGRAM(s) Oral every 12 hours  polyethylene glycol 3350 17 Gram(s) Oral two times a day  senna 2 Tablet(s) Oral at bedtime  sevelamer carbonate 800 milliGRAM(s) Oral three times a day  sodium bicarbonate 1625 milliGRAM(s) Oral three times a day  sucralfate 1 Gram(s) Oral every 6 hours  thiamine 100 milliGRAM(s) Oral daily    PRN Inpatient Medications  bisacodyl 5 milliGRAM(s) Oral every 12 hours PRN  ondansetron Injectable 4 milliGRAM(s) IV Push every 6 hours PRN  oxyCODONE    IR 10 milliGRAM(s) Oral every 4 hours PRN  tiZANidine 2 milliGRAM(s) Oral every 8 hours PRN    REVIEW OF SYSTEMS  --------------------------------------------------------------------------------  Gen: No fevers   Respiratory: No dyspnea  CV: No chest pain  GI: No abdominal pain  : No dysuria  MSK: No  edema  Neuro: no dizziness   All other systems were reviewed and are negative, except as noted.    VITALS/PHYSICAL EXAM  --------------------------------------------------------------------------------  T(C): 36.4 (01-26-23 @ 09:45), Max: 36.7 (01-25-23 @ 19:09)  HR: 74 (01-26-23 @ 09:45) (74 - 98)  BP: 121/59 (01-26-23 @ 09:45) (112/73 - 161/74)  RR: 17 (01-26-23 @ 09:45) (16 - 18)  SpO2: 100% (01-26-23 @ 09:45) (99% - 100%)  Wt(kg): --    01-25-23 @ 07:01  -  01-26-23 @ 07:00  --------------------------------------------------------  IN: 1070 mL / OUT: 900 mL / NET: 170 mL    Physical Exam:  Gen NAD  HEENT: no JVD  Pulm: CTABL  CV: S1S2,  Abd: Soft,   Ext: - edema B/L LE, B/L foot wound evident   Neuro: Awake and alert  Skin: Warm and dry  Vascular: LUE AVF +weak bruit	    LABS/STUDIES  --------------------------------------------------------------------------------              8.5    9.30  >-----------<  262      [01-26-23 @ 06:25]              27.2     139  |  112  |  104  ----------------------------<  93      [01-26-23 @ 06:25]  3.9   |  10  |  7.03        Ca     8.1     [01-26-23 @ 06:25]      Mg     1.60     [01-26-23 @ 06:25]      Phos  6.2     [01-26-23 @ 06:25]    Creatinine Trend:  SCr 7.03 [01-26 @ 06:25]  SCr 6.88 [01-25 @ 06:03]  SCr 6.48 [01-24 @ 12:00]  SCr 6.47 [01-23 @ 06:02]  SCr 5.84 [01-21 @ 07:00]    Iron 95, TIBC <125, %sat --      [01-16-23 @ 11:00]  Ferritin 311      [01-03-23 @ 06:12]  PTH -- (Ca --)      [12-22-22 @ 06:40]   290  Vitamin D (25OH) 10.4      [01-04-23 @ 06:45]  HbA1c 5.0      [03-22-19 @ 09:03]  TSH 1.380      [09-01-22 @ 08:58]  Lipid: chol 147, TG 88, HDL 59, LDL --      [09-01-22 @ 08:58]

## 2023-01-26 NOTE — CHART NOTE - NSCHARTNOTEFT_GEN_A_CORE
Medicine NP note    Notified in am by RN that pt has bicarb - 10,  Attending Dr. Burgess notified, sodium bicarbonate 1625 mg PO TID prescribed after single dose 1300 mg. Also notified by Radiology that pt has right brachial DVT on vascular doppler, acute non occlusive, Attending , Dr. Burgess notified, unable to start heparin drip, pt does not have IV access , unable to place it on any of arm ( left arm has AV fistula, right arm has recently found DVT ), pt for repeat of angiogram at OR on 1/27 with pedal access , IR consult ordered for placement of IV access, pt for placement of tunneled catheter on 1/27 with IR, pt to have Anesthesia evaluation before tomorrow angio, endorse to night provider    Kourtney Edwards NP-C  [facility]  In House Pager #[phone]

## 2023-01-26 NOTE — PROGRESS NOTE ADULT - SUBJECTIVE AND OBJECTIVE BOX
[facility]  Division of Hospital Medicine  Vera Burgess MD  Pager: [phone]      Patient is a 62y old  Male who presents with a chief complaint of intractable N/V , esophageal stent removal (26 Jan 2023 06:06)      SUBJECTIVE / OVERNIGHT EVENTS:  ADDITIONAL REVIEW OF SYSTEMS:    MEDICATIONS  (STANDING):  albuterol/ipratropium for Nebulization. 3 milliLiter(s) Nebulizer once  cadexomer iodine 0.9% Gel 1 Application(s) Topical daily  calcitriol   Capsule 0.5 MICROGram(s) Oral daily  epoetin mejia-epbx (RETACRIT) Injectable 23889 Unit(s) SubCutaneous <User Schedule>  folic acid 1 milliGRAM(s) Oral daily  heparin   Injectable 5000 Unit(s) SubCutaneous every 8 hours  metoclopramide 5 milliGRAM(s) Oral three times a day  multivitamin 1 Tablet(s) Oral daily  NIFEdipine XL 30 milliGRAM(s) Oral daily  pancrelipase  (CREON  6,000 Lipase Units) 1 Capsule(s) Oral three times a day with meals  pantoprazole    Tablet 40 milliGRAM(s) Oral every 12 hours  polyethylene glycol 3350 17 Gram(s) Oral two times a day  senna 2 Tablet(s) Oral at bedtime  sevelamer carbonate 800 milliGRAM(s) Oral three times a day  sodium bicarbonate 1300 milliGRAM(s) Oral once  sodium bicarbonate 1625 milliGRAM(s) Oral three times a day  sucralfate 1 Gram(s) Oral every 6 hours  thiamine 100 milliGRAM(s) Oral daily    MEDICATIONS  (PRN):  bisacodyl 5 milliGRAM(s) Oral every 12 hours PRN Constipation  ondansetron Injectable 4 milliGRAM(s) IV Push every 6 hours PRN Nausea and/or Vomiting  oxyCODONE    IR 10 milliGRAM(s) Oral every 4 hours PRN Moderate Pain (4 - 6)-Severe pain (7-10)  tiZANidine 2 milliGRAM(s) Oral every 8 hours PRN Muscle Cramps      CAPILLARY BLOOD GLUCOSE        I&O's Summary    25 Jan 2023 07:01  -  26 Jan 2023 07:00  --------------------------------------------------------  IN: 1070 mL / OUT: 900 mL / NET: 170 mL        PHYSICAL EXAM:  Vital Signs Last 24 Hrs  T(C): 36.4 (26 Jan 2023 09:45), Max: 36.7 (25 Jan 2023 19:09)  T(F): 97.6 (26 Jan 2023 09:45), Max: 98.1 (25 Jan 2023 19:09)  HR: 74 (26 Jan 2023 09:45) (74 - 98)  BP: 121/59 (26 Jan 2023 09:45) (112/73 - 161/74)  BP(mean): --  RR: 17 (26 Jan 2023 09:45) (16 - 18)  SpO2: 100% (26 Jan 2023 09:45) (99% - 100%)    Parameters below as of 26 Jan 2023 09:45  Patient On (Oxygen Delivery Method): room air      CONSTITUTIONAL: NAD, well-developed, well-groomed  EYES: PERRLA; conjunctiva and sclera clear  ENMT: Moist oral mucosa, no pharyngeal injection or exudates; normal dentition  NECK: Supple, no palpable masses; no thyromegaly  RESPIRATORY: Normal respiratory effort; lungs are clear to auscultation bilaterally  CARDIOVASCULAR: Regular rate and rhythm, normal S1 and S2, no murmur/rub/gallop; No lower extremity edema; Peripheral pulses are 2+ bilaterally  ABDOMEN: Nontender to palpation, normoactive bowel sounds, no rebound/guarding; No hepatosplenomegaly  MUSCULOSKELETAL:  Normal gait; no clubbing or cyanosis of digits; no joint swelling or tenderness to palpation  PSYCH: A+O to person, place, and time; affect appropriate  NEUROLOGY: CN 2-12 are intact and symmetric; no gross sensory deficits   SKIN: No rashes; no palpable lesions    LABS:                        8.5    9.30  )-----------( 262      ( 26 Jan 2023 06:25 )             27.2     01-26    139  |  112<H>  |  104<H>  ----------------------------<  93  3.9   |  10<LL>  |  7.03<H>    Ca    8.1<L>      26 Jan 2023 06:25  Phos  6.2     01-26  Mg     1.60     01-26                  RADIOLOGY & ADDITIONAL TESTS:  Results Reviewed:   Imaging Personally Reviewed:  Electrocardiogram Personally Reviewed:    COORDINATION OF CARE:  Care Discussed with Consultants/Other Providers [Y/N]:  Prior or Outpatient Records Reviewed [Y/N]:   [facility]  Division of Hospital Medicine  Vera Burgess MD  Pager: [phone]      Patient is a 62y old  Male who presents with a chief complaint of intractable N/V , esophageal stent removal (26 Jan 2023 06:06)      SUBJECTIVE / OVERNIGHT EVENTS: Patient intermittently refusing bicarb pills. Patient with DVt in upper extremities limiting access.   ADDITIONAL REVIEW OF SYSTEMS:    MEDICATIONS  (STANDING):  albuterol/ipratropium for Nebulization. 3 milliLiter(s) Nebulizer once  cadexomer iodine 0.9% Gel 1 Application(s) Topical daily  calcitriol   Capsule 0.5 MICROGram(s) Oral daily  epoetin mejia-epbx (RETACRIT) Injectable 65586 Unit(s) SubCutaneous <User Schedule>  folic acid 1 milliGRAM(s) Oral daily  heparin   Injectable 5000 Unit(s) SubCutaneous every 8 hours  metoclopramide 5 milliGRAM(s) Oral three times a day  multivitamin 1 Tablet(s) Oral daily  NIFEdipine XL 30 milliGRAM(s) Oral daily  pancrelipase  (CREON  6,000 Lipase Units) 1 Capsule(s) Oral three times a day with meals  pantoprazole    Tablet 40 milliGRAM(s) Oral every 12 hours  polyethylene glycol 3350 17 Gram(s) Oral two times a day  senna 2 Tablet(s) Oral at bedtime  sevelamer carbonate 800 milliGRAM(s) Oral three times a day  sodium bicarbonate 1300 milliGRAM(s) Oral once  sodium bicarbonate 1625 milliGRAM(s) Oral three times a day  sucralfate 1 Gram(s) Oral every 6 hours  thiamine 100 milliGRAM(s) Oral daily    MEDICATIONS  (PRN):  bisacodyl 5 milliGRAM(s) Oral every 12 hours PRN Constipation  ondansetron Injectable 4 milliGRAM(s) IV Push every 6 hours PRN Nausea and/or Vomiting  oxyCODONE    IR 10 milliGRAM(s) Oral every 4 hours PRN Moderate Pain (4 - 6)-Severe pain (7-10)  tiZANidine 2 milliGRAM(s) Oral every 8 hours PRN Muscle Cramps      CAPILLARY BLOOD GLUCOSE        I&O's Summary    25 Jan 2023 07:01  -  26 Jan 2023 07:00  --------------------------------------------------------  IN: 1070 mL / OUT: 900 mL / NET: 170 mL        PHYSICAL EXAM:  Vital Signs Last 24 Hrs  T(C): 36.4 (26 Jan 2023 09:45), Max: 36.7 (25 Jan 2023 19:09)  T(F): 97.6 (26 Jan 2023 09:45), Max: 98.1 (25 Jan 2023 19:09)  HR: 74 (26 Jan 2023 09:45) (74 - 98)  BP: 121/59 (26 Jan 2023 09:45) (112/73 - 161/74)  BP(mean): --  RR: 17 (26 Jan 2023 09:45) (16 - 18)  SpO2: 100% (26 Jan 2023 09:45) (99% - 100%)    Parameters below as of 26 Jan 2023 09:45  Patient On (Oxygen Delivery Method): room air      General:  Well appearing, NAD, cachetic  HEENT:  Nonicteric, PERRLA  CV:  RRR, no murmur, no JVD  Lungs:  CTA B/L, no wheezes, rales, rhonchi  Abdomen:  Soft, non-tender, no distended, positive BS  Extremities:  2+ pulses, no c/c, B/L feet covered with dressing; right arm swollen   Skin:  Warm and dry, no rashes  :  No chen  Neuro:  AAOx3, non-focal, CN II-XII grossly intact  No Restraints    LABS:                        8.5    9.30  )-----------( 262      ( 26 Jan 2023 06:25 )             27.2     01-26    139  |  112<H>  |  104<H>  ----------------------------<  93  3.9   |  10<LL>  |  7.03<H>    Ca    8.1<L>      26 Jan 2023 06:25  Phos  6.2     01-26  Mg     1.60     01-26                  RADIOLOGY & ADDITIONAL TESTS:  Results Reviewed:   Imaging Personally Reviewed:  Electrocardiogram Personally Reviewed:    COORDINATION OF CARE:  Care Discussed with Consultants/Other Providers [Y/N]:  Prior or Outpatient Records Reviewed [Y/N]:

## 2023-01-26 NOTE — PROGRESS NOTE ADULT - PROBLEM SELECTOR PLAN 4
S/p HD X 3  in ICU in [facility], Cr plateaued at 5.7-6, off HD now; per renal plan for AVF creation on this admission as per d/w OP nephrologist baseline Cr is 3-CKD stage 4-5 and nearing ESRD  - [facility] renal duplex with severe stenosis of the left renal artery; vascular consulted, no plan for intervention for HESHAM  - sevelamer 800 mg TID, bicarb 1300 mg TID; calcitriol increased to 0.5 for secondary hyperparathyroidism per renal  - s/p AVF w/ vascular on 1/5 to the L arm, L arm precautions  - avoid nephrotoxic  - renally dose meds  - trend Cr and electrolytes closely  - discussed with nephrology patient is stable for d/c to [facility] from nephrology perspective, will d/c Bicitra as patient is not tolerating it  - Per Renal fistula examined with faint bruit, will have vascular f/up on patient S/p HD X 3  in ICU in [facility], Cr plateaued at 5.7-6, off HD now; per renal plan for AVF creation on this admission as per d/w OP nephrologist baseline Cr is 3-CKD stage 4-5 and nearing ESRD  - [facility] renal duplex with severe stenosis of the left renal artery; vascular consulted, no plan for intervention for HESHAM  - sevelamer 800 mg TID, bicarb 1300 mg TID; calcitriol increased to 0.5 for secondary hyperparathyroidism per renal  - s/p AVF w/ vascular on 1/5 to the L arm, L arm precautions  - avoid nephrotoxic  - renally dose meds  - trend Cr and electrolytes closely  - discussed with nephrology patient is stable for d/c to [facility] from nephrology perspective, will d/c Bicitra as patient is not tolerating it  [ ] Patient intermittently refusing sodium bicarb PO, one access obtained, can start IV bicarb infusion to prepare for OR   - Per Renal fistula examined with faint bruit, will have vascular f/up on patient

## 2023-01-26 NOTE — HISTORY OF PRESENT ILLNESS
[FreeTextEntry1] : FU pain [de-identified] : Patient is a 62 y.o M with PMHx of ETOH abuse (quit about 4-5 years ago), chronic pancreatitis, Hep C (past history of treatment), gout, gastric perf in 2019 (s/p repair), s/p [place] procedure 7/2022 presenting for pain follow-up.\par \par At last visit patient noted that Chronic hiccups have been bothering him more than normal. Sent for trial of PPI without improvement of symptoms. Could consider Gabapentin, Baclofen, or Reglan as hiccup treatment. Given Oxycodone use Reglan is likely the safer choice. Discussed this with patient. Will trial reglan 10mg TID for a month\par \par Pain remains 10/10. PEG scale 10. Notes that pain is imporved for around 1 hour with pills. Admits to taking extra pills when he feels pain is heighhtened and uncontrolled. Understands he can't get medications earlier than prescribed. Pain appointment in January. Notes that medication can bring pain down to an 8 osmetimes but usually just enough he can sleep. ISTOP checked with no outside doctors prescribing. No recent hospitalizations. Currently on 90MME (10mg oxy q4h) Quality 111:Pneumonia Vaccination Status For Older Adults: Pneumococcal vaccine (PPSV23) administered on or after patient’s 60th birthday and before the end of the measurement period Detail Level: Detailed Quality 110: Preventive Care And Screening: Influenza Immunization: Influenza Immunization Administered during Influenza season

## 2023-01-26 NOTE — PROGRESS NOTE ADULT - SUBJECTIVE AND OBJECTIVE BOX
SURGERY  Pager: #[phone]    INTERVAL EVENTS/SUBJECTIVE: No acute events overnight. Patient seen and examined at bedside, no new complaints.     ______________________________________________  OBJECTIVE:   T(C): 36.6 (01-26-23 @ 02:10), Max: 36.7 (01-25-23 @ 10:29)  HR: 98 (01-26-23 @ 02:10) (81 - 98)  BP: 122/65 (01-26-23 @ 02:10) (109/58 - 161/74)  RR: 16 (01-26-23 @ 02:10) (16 - 18)  SpO2: 100% (01-26-23 @ 02:10) (99% - 100%)  Wt(kg): --  CAPILLARY BLOOD GLUCOSE        I&O's Detail    24 Jan 2023 07:01  -  25 Jan 2023 07:00  --------------------------------------------------------  IN:  Total IN: 0 mL    OUT:    Voided (mL): 700 mL  Total OUT: 700 mL    Total NET: -700 mL      25 Jan 2023 07:01  -  26 Jan 2023 06:07  --------------------------------------------------------  IN:    Oral Fluid: 1070 mL  Total IN: 1070 mL    OUT:    Voided (mL): 900 mL  Total OUT: 900 mL    Total NET: 170 mL          Physical Exam:  Gen: resting in bed comfortably in NAD  Chest: no increased WOB, regular inspiratory effort   Abdomen: Soft, nontender, nondistended with no rebound tenderness or guarding.   Vascular: L groin soft and flat, LE +DP/PT signals. LUE with palpable radial and ulnar pulses, sensation/motor intact. Palpable over AVF   Neuro: awake, alert    ______________________________________________  LABS:  CBC Full  -  ( 25 Jan 2023 06:03 )  WBC Count : 10.70 K/uL  RBC Count : 2.52 M/uL  Hemoglobin : 7.6 g/dL  Hematocrit : 24.3 %  Platelet Count - Automated : 242 K/uL  Mean Cell Volume : 96.4 fL  Mean Cell Hemoglobin : 30.2 pg  Mean Cell Hemoglobin Concentration : 31.3 gm/dL  Auto Neutrophil # : x  Auto Lymphocyte # : x  Auto Monocyte # : x  Auto Eosinophil # : x  Auto Basophil # : x  Auto Neutrophil % : x  Auto Lymphocyte % : x  Auto Monocyte % : x  Auto Eosinophil % : x  Auto Basophil % : x    01-25    138  |  110<H>  |  97<H>  ----------------------------<  95  4.3   |  11<L>  |  6.88<H>    Ca    8.1<L>      25 Jan 2023 06:03  Phos  6.0     01-25  Mg     1.60     01-25      _____________________________________________  RADIOLOGY:

## 2023-01-26 NOTE — CHART NOTE - NSCHARTNOTEFT_GEN_A_CORE
PRE-INTERVENTIONAL RADIOLOGY PROCEDURE NOTE      Patient Age: 63 y/o    Patient Gender: Male    Procedure: placement of tunneled PICC     Diagnosis/Indication: unable to obtain IV access in patient, patient has swollen right UE with right UE DVT and left AV fistula and small pedal veins     Interventional Radiology Attending Physician: Dr. Marques     Ordering Attending Physician: Vera Burgess    Pertinent Medical History:   chronic pancreatitis, CKD, left arm AV fistula, right arm DVT, emphysema, anemia, PUD, gastroparesis, Hep C, ETOH abuse,metabolic acidosis,no palpable pulses, PAD    Pertinent labs:                      8.5    9.30  )-----------( 262      ( 26 Jan 2023 06:25 )             27.2       01-26    139  |  112<H>  |  104<H>  ----------------------------<  93  3.9   |  10<LL>  |  7.03<H>    Ca    8.1<L>      26 Jan 2023 06:25  Phos  6.2     01-26  Mg     1.60     01-26      Patient and Family Aware ? Yes PRE-INTERVENTIONAL RADIOLOGY PROCEDURE NOTE      Patient Age: 63 y/o    Patient Gender: Male    Procedure: placement of tunneled catheter placement     Diagnosis/Indication: unable to obtain IV access in patient, patient has swollen right UE with right UE DVT and left AV fistula and small pedal veins     Interventional Radiology Attending Physician: Dr. Marques     Ordering Attending Physician: Vera Burgess    Pertinent Medical History:   chronic pancreatitis, CKD, left arm AV fistula, right arm DVT, emphysema, anemia, PUD, gastroparesis, Hep C, ETOH abuse,metabolic acidosis,no palpable pulses, PAD    Pertinent labs:                      8.5    9.30  )-----------( 262      ( 26 Jan 2023 06:25 )             27.2       01-26    139  |  112<H>  |  104<H>  ----------------------------<  93  3.9   |  10<LL>  |  7.03<H>    Ca    8.1<L>      26 Jan 2023 06:25  Phos  6.2     01-26  Mg     1.60     01-26      Patient and Family Aware ? Yes PRE-INTERVENTIONAL RADIOLOGY PROCEDURE NOTE      Patient Age: 63 y/o    Patient Gender: Male    Procedure:     Updated as per 1/27 / 2023 after discussion with Nephrology and Attending and Dr Daniel Denney , Radiology , pt will need one tunneled catheter for HD and one one lumien catheter bfor IV access / heparin drip.    Diagnosis/Indication: unable to obtain IV access in patient, patient has swollen right UE with right UE DVT and left AV fistula and small pedal veins     Interventional Radiology Attending Physician: Dr. Marques     Ordering Attending Physician: Vera Burgess    Pertinent Medical History:   chronic pancreatitis, CKD, left arm AV fistula, right arm DVT, emphysema, anemia, PUD, gastroparesis, Hep C, ETOH abuse,metabolic acidosis,no palpable pulses, PAD    Pertinent labs:                      8.5    9.30  )-----------( 262      ( 26 Jan 2023 06:25 )             27.2       01-26    139  |  112<H>  |  104<H>  ----------------------------<  93  3.9   |  10<LL>  |  7.03<H>    Ca    8.1<L>      26 Jan 2023 06:25  Phos  6.2     01-26  Mg     1.60     01-26      Patient and Family Aware ? Yes PRE-INTERVENTIONAL RADIOLOGY PROCEDURE NOTE      Patient Age: 63 y/o    Patient Gender: Male    Procedure:     Updated as per 1/27 / 2023 after discussion with Nephrology and Attending and Dr Daniel Denney , Radiology , pt will need one NON - tunneled catheter for HD and one single lumien catheter bfor IV access / heparin drip ( updated at 12: 50 pm )    Diagnosis/Indication: unable to obtain IV access in patient, patient has swollen right UE with right UE DVT and left AV fistula and small pedal veins     Interventional Radiology Attending Physician: Dr. Marques     Ordering Attending Physician: Vera Burgess    Pertinent Medical History:   chronic pancreatitis, CKD, left arm AV fistula, right arm DVT, emphysema, anemia, PUD, gastroparesis, Hep C, ETOH abuse,metabolic acidosis,no palpable pulses, PAD    Pertinent labs:                      8.5    9.30  )-----------( 262      ( 26 Jan 2023 06:25 )             27.2       01-26    139  |  112<H>  |  104<H>  ----------------------------<  93  3.9   |  10<LL>  |  7.03<H>    Ca    8.1<L>      26 Jan 2023 06:25  Phos  6.2     01-26  Mg     1.60     01-26      Patient and Family Aware ? Yes PRE-INTERVENTIONAL RADIOLOGY PROCEDURE NOTE      Patient Age: 61 y/o    Patient Gender: Male    Procedure:     Updated as per 1/27 / 2023 after discussion with Nephrology and Attending and Dr Daniel Denney , Radiology , pt will need one NON - tunneled catheter for HD and one single lumien catheter for IV access / heparin drip ( updated at 12: 50 pm )    Diagnosis/Indication: unable to obtain IV access in patient, patient has swollen right UE with right UE DVT and left AV fistula and small pedal veins     Interventional Radiology Attending Physician: Dr. Marques     Ordering Attending Physician: Vera Burgess    Pertinent Medical History:   chronic pancreatitis, CKD, left arm AV fistula, right arm DVT, emphysema, anemia, PUD, gastroparesis, Hep C, ETOH abuse,metabolic acidosis,no palpable pulses, PAD    Pertinent labs:                      8.5    9.30  )-----------( 262      ( 26 Jan 2023 06:25 )             27.2       01-26    139  |  112<H>  |  104<H>  ----------------------------<  93  3.9   |  10<LL>  |  7.03<H>    Ca    8.1<L>      26 Jan 2023 06:25  Phos  6.2     01-26  Mg     1.60     01-26      Patient and Family Aware ? Yes

## 2023-01-27 NOTE — PROGRESS NOTE ADULT - PROBLEM SELECTOR PLAN 1
Pt with YUNIEL on CKD likely multifactorial in the setting of recent COVID infection, poor oral intake. Scr was elevated at 2.20 in September 2022. Pt was recently admitted at [facility]. Scr on admission was significantly elevated at 8.94 on 11/25/22. Pt received 1st HD session on 11/26/22. Last HD session was done on 12/1/22. HD catheter was removed as kidney function was recovering. Scr was elevated at 5.28 on transfer from [facility] on 12/15/22. Scr remained elevated/stable at 5.40 on 1/5/23. Of note kidney sonogram had discrepant size renal artery duplex showing HESHAM but BP well controlled. Discussed with Dr. Dukes (12/21) who knows the patient very well and has seen him inpatient and outpatient over the last several years. As per Dr. Dukes the patient's baseline creatinine is 3 and has advanced CKD and is nearing ESKD. Pt underwent cardiac cath on 1/4/23 , tolerated procedure well. Underwent LUE AVF creation on 1/5/23. Last Scr is elevated/stable at 7.18 today (1/27/23). Pt with uremic symptoms now. Plan for HD today once HD catheter is placed. Monitor labs and urine output. Avoid any potential nephrotoxins. Dose medications as per eGFR.

## 2023-01-27 NOTE — PROGRESS NOTE ADULT - SUBJECTIVE AND OBJECTIVE BOX
[facility]  Division of Hospital Medicine  Vera Burgess MD  Pager: [phone]      Patient is a 62y old  Male who presents with a chief complaint of intractable N/V , esophageal stent removal (27 Jan 2023 09:40)      SUBJECTIVE / OVERNIGHT EVENTS:  ADDITIONAL REVIEW OF SYSTEMS:    MEDICATIONS  (STANDING):  albuterol/ipratropium for Nebulization. 3 milliLiter(s) Nebulizer once  cadexomer iodine 0.9% Gel 1 Application(s) Topical daily  calcitriol   Capsule 0.5 MICROGram(s) Oral daily  epoetin mejia-epbx (RETACRIT) Injectable 89295 Unit(s) SubCutaneous <User Schedule>  folic acid 1 milliGRAM(s) Oral daily  metoclopramide 5 milliGRAM(s) Oral three times a day  multivitamin 1 Tablet(s) Oral daily  NIFEdipine XL 30 milliGRAM(s) Oral daily  pancrelipase  (CREON  6,000 Lipase Units) 1 Capsule(s) Oral three times a day with meals  pantoprazole    Tablet 40 milliGRAM(s) Oral every 12 hours  polyethylene glycol 3350 17 Gram(s) Oral two times a day  senna 2 Tablet(s) Oral at bedtime  sevelamer carbonate 800 milliGRAM(s) Oral three times a day  sodium bicarbonate 1625 milliGRAM(s) Oral three times a day  sucralfate 1 Gram(s) Oral every 6 hours  thiamine 100 milliGRAM(s) Oral daily    MEDICATIONS  (PRN):  bisacodyl 5 milliGRAM(s) Oral every 12 hours PRN Constipation  ondansetron Injectable 4 milliGRAM(s) IV Push every 6 hours PRN Nausea and/or Vomiting  oxyCODONE    IR 10 milliGRAM(s) Oral every 4 hours PRN Moderate Pain (4 - 6)-Severe pain (7-10)  tiZANidine 2 milliGRAM(s) Oral every 8 hours PRN Muscle Cramps      CAPILLARY BLOOD GLUCOSE      POCT Blood Glucose.: 105 mg/dL (27 Jan 2023 07:36)    I&O's Summary      PHYSICAL EXAM:  Vital Signs Last 24 Hrs  T(C): 36.5 (27 Jan 2023 05:30), Max: 36.5 (26 Jan 2023 20:45)  T(F): 97.7 (27 Jan 2023 05:30), Max: 97.7 (26 Jan 2023 20:45)  HR: 89 (27 Jan 2023 05:30) (87 - 90)  BP: 130/54 (27 Jan 2023 05:30) (126/66 - 136/75)  BP(mean): --  RR: 16 (27 Jan 2023 05:30) (16 - 16)  SpO2: 100% (27 Jan 2023 05:30) (100% - 100%)    Parameters below as of 27 Jan 2023 05:30  Patient On (Oxygen Delivery Method): room air      CONSTITUTIONAL: NAD, well-developed, well-groomed  EYES: PERRLA; conjunctiva and sclera clear  ENMT: Moist oral mucosa, no pharyngeal injection or exudates; normal dentition  NECK: Supple, no palpable masses; no thyromegaly  RESPIRATORY: Normal respiratory effort; lungs are clear to auscultation bilaterally  CARDIOVASCULAR: Regular rate and rhythm, normal S1 and S2, no murmur/rub/gallop; No lower extremity edema; Peripheral pulses are 2+ bilaterally  ABDOMEN: Nontender to palpation, normoactive bowel sounds, no rebound/guarding; No hepatosplenomegaly  MUSCULOSKELETAL:  Normal gait; no clubbing or cyanosis of digits; no joint swelling or tenderness to palpation  PSYCH: A+O to person, place, and time; affect appropriate  NEUROLOGY: CN 2-12 are intact and symmetric; no gross sensory deficits   SKIN: No rashes; no palpable lesions    LABS:                        7.8    8.75  )-----------( 252      ( 27 Jan 2023 02:38 )             25.1     01-27    141  |  113<H>  |  104<H>  ----------------------------<  94  3.7   |  11<L>  |  7.18<H>    Ca    7.7<L>      27 Jan 2023 02:38  Phos  6.5     01-27  Mg     1.60     01-27      PT/INR - ( 27 Jan 2023 02:38 )   PT: 11.6 sec;   INR: 1.00 ratio         PTT - ( 27 Jan 2023 02:38 )  PTT:30.0 sec            RADIOLOGY & ADDITIONAL TESTS:  Results Reviewed:   Imaging Personally Reviewed:  Electrocardiogram Personally Reviewed:    COORDINATION OF CARE:  Care Discussed with Consultants/Other Providers [Y/N]:  Prior or Outpatient Records Reviewed [Y/N]:   [facility]  Division of Hospital Medicine  Vera Burgess MD  Pager: [phone]      Patient is a 62y old  Male who presents with a chief complaint of intractable N/V , esophageal stent removal (27 Jan 2023 09:40)      SUBJECTIVE / OVERNIGHT EVENTS: Patient examined at bedside. Discussed findings of ultrasound and blood clot. PAtient without medical concerns   ADDITIONAL REVIEW OF SYSTEMS:    MEDICATIONS  (STANDING):  albuterol/ipratropium for Nebulization. 3 milliLiter(s) Nebulizer once  cadexomer iodine 0.9% Gel 1 Application(s) Topical daily  calcitriol   Capsule 0.5 MICROGram(s) Oral daily  epoetin mejia-epbx (RETACRIT) Injectable 71821 Unit(s) SubCutaneous <User Schedule>  folic acid 1 milliGRAM(s) Oral daily  metoclopramide 5 milliGRAM(s) Oral three times a day  multivitamin 1 Tablet(s) Oral daily  NIFEdipine XL 30 milliGRAM(s) Oral daily  pancrelipase  (CREON  6,000 Lipase Units) 1 Capsule(s) Oral three times a day with meals  pantoprazole    Tablet 40 milliGRAM(s) Oral every 12 hours  polyethylene glycol 3350 17 Gram(s) Oral two times a day  senna 2 Tablet(s) Oral at bedtime  sevelamer carbonate 800 milliGRAM(s) Oral three times a day  sodium bicarbonate 1625 milliGRAM(s) Oral three times a day  sucralfate 1 Gram(s) Oral every 6 hours  thiamine 100 milliGRAM(s) Oral daily    MEDICATIONS  (PRN):  bisacodyl 5 milliGRAM(s) Oral every 12 hours PRN Constipation  ondansetron Injectable 4 milliGRAM(s) IV Push every 6 hours PRN Nausea and/or Vomiting  oxyCODONE    IR 10 milliGRAM(s) Oral every 4 hours PRN Moderate Pain (4 - 6)-Severe pain (7-10)  tiZANidine 2 milliGRAM(s) Oral every 8 hours PRN Muscle Cramps      CAPILLARY BLOOD GLUCOSE      POCT Blood Glucose.: 105 mg/dL (27 Jan 2023 07:36)    I&O's Summary      PHYSICAL EXAM:  Vital Signs Last 24 Hrs  T(C): 36.5 (27 Jan 2023 05:30), Max: 36.5 (26 Jan 2023 20:45)  T(F): 97.7 (27 Jan 2023 05:30), Max: 97.7 (26 Jan 2023 20:45)  HR: 89 (27 Jan 2023 05:30) (87 - 90)  BP: 130/54 (27 Jan 2023 05:30) (126/66 - 136/75)  BP(mean): --  RR: 16 (27 Jan 2023 05:30) (16 - 16)  SpO2: 100% (27 Jan 2023 05:30) (100% - 100%)    Parameters below as of 27 Jan 2023 05:30  Patient On (Oxygen Delivery Method): room air      CONSTITUTIONAL: NAD, well-developed, well-groomed  EYES: PERRLA; conjunctiva and sclera clear  ENMT: Moist oral mucosa, no pharyngeal injection or exudates; normal dentition  NECK: Supple, no palpable masses; no thyromegaly  RESPIRATORY: Normal respiratory effort; lungs are clear to auscultation bilaterally  CARDIOVASCULAR: Regular rate and rhythm, normal S1 and S2, no murmur/rub/gallop; No lower extremity edema; Peripheral pulses are 2+ bilaterally  ABDOMEN: Nontender to palpation, normoactive bowel sounds, no rebound/guarding; No hepatosplenomegaly  MUSCULOSKELETAL:  Normal gait; no clubbing or cyanosis of digits; no joint swelling or tenderness to palpation  PSYCH: A+O to person, place, and time; affect appropriate  NEUROLOGY: CN 2-12 are intact and symmetric; no gross sensory deficits   SKIN: No rashes; no palpable lesions    LABS:                        7.8    8.75  )-----------( 252      ( 27 Jan 2023 02:38 )             25.1     01-27    141  |  113<H>  |  104<H>  ----------------------------<  94  3.7   |  11<L>  |  7.18<H>    Ca    7.7<L>      27 Jan 2023 02:38  Phos  6.5     01-27  Mg     1.60     01-27      PT/INR - ( 27 Jan 2023 02:38 )   PT: 11.6 sec;   INR: 1.00 ratio         PTT - ( 27 Jan 2023 02:38 )  PTT:30.0 sec            RADIOLOGY & ADDITIONAL TESTS:  Results Reviewed:   Imaging Personally Reviewed:  Electrocardiogram Personally Reviewed:    COORDINATION OF CARE:  Care Discussed with Consultants/Other Providers [Y/N]:  Prior or Outpatient Records Reviewed [Y/N]:   [facility]  Division of Hospital Medicine  Vera Burgess MD  Pager: [phone]      Patient is a 62y old  Male who presents with a chief complaint of intractable N/V , esophageal stent removal (27 Jan 2023 09:40)      SUBJECTIVE / OVERNIGHT EVENTS: Patient examined at bedside. Discussed findings of ultrasound and blood clot. PAtient without medical concerns   ADDITIONAL REVIEW OF SYSTEMS:    MEDICATIONS  (STANDING):  albuterol/ipratropium for Nebulization. 3 milliLiter(s) Nebulizer once  cadexomer iodine 0.9% Gel 1 Application(s) Topical daily  calcitriol   Capsule 0.5 MICROGram(s) Oral daily  epoetin mejia-epbx (RETACRIT) Injectable 54296 Unit(s) SubCutaneous <User Schedule>  folic acid 1 milliGRAM(s) Oral daily  metoclopramide 5 milliGRAM(s) Oral three times a day  multivitamin 1 Tablet(s) Oral daily  NIFEdipine XL 30 milliGRAM(s) Oral daily  pancrelipase  (CREON  6,000 Lipase Units) 1 Capsule(s) Oral three times a day with meals  pantoprazole    Tablet 40 milliGRAM(s) Oral every 12 hours  polyethylene glycol 3350 17 Gram(s) Oral two times a day  senna 2 Tablet(s) Oral at bedtime  sevelamer carbonate 800 milliGRAM(s) Oral three times a day  sodium bicarbonate 1625 milliGRAM(s) Oral three times a day  sucralfate 1 Gram(s) Oral every 6 hours  thiamine 100 milliGRAM(s) Oral daily    MEDICATIONS  (PRN):  bisacodyl 5 milliGRAM(s) Oral every 12 hours PRN Constipation  ondansetron Injectable 4 milliGRAM(s) IV Push every 6 hours PRN Nausea and/or Vomiting  oxyCODONE    IR 10 milliGRAM(s) Oral every 4 hours PRN Moderate Pain (4 - 6)-Severe pain (7-10)  tiZANidine 2 milliGRAM(s) Oral every 8 hours PRN Muscle Cramps      CAPILLARY BLOOD GLUCOSE      POCT Blood Glucose.: 105 mg/dL (27 Jan 2023 07:36)    I&O's Summary      PHYSICAL EXAM:  Vital Signs Last 24 Hrs  T(C): 36.5 (27 Jan 2023 05:30), Max: 36.5 (26 Jan 2023 20:45)  T(F): 97.7 (27 Jan 2023 05:30), Max: 97.7 (26 Jan 2023 20:45)  HR: 89 (27 Jan 2023 05:30) (87 - 90)  BP: 130/54 (27 Jan 2023 05:30) (126/66 - 136/75)  BP(mean): --  RR: 16 (27 Jan 2023 05:30) (16 - 16)  SpO2: 100% (27 Jan 2023 05:30) (100% - 100%)    Parameters below as of 27 Jan 2023 05:30  Patient On (Oxygen Delivery Method): room air      General:  Well appearing, NAD, cachetic  HEENT:  Nonicteric, PERRLA  CV:  RRR, no murmur, no JVD  Lungs:  CTA B/L, no wheezes, rales, rhonchi  Abdomen:  Soft, non-tender, no distended, positive BS  Extremities:  2+ pulses, no c/c, B/L feet covered with dressing; right arm swollen   Skin:  Warm and dry, no rashes  :  No chen  Neuro:  AAOx3, non-focal, CN II-XII grossly intact  No Restraints    LABS:                        7.8    8.75  )-----------( 252      ( 27 Jan 2023 02:38 )             25.1     01-27    141  |  113<H>  |  104<H>  ----------------------------<  94  3.7   |  11<L>  |  7.18<H>    Ca    7.7<L>      27 Jan 2023 02:38  Phos  6.5     01-27  Mg     1.60     01-27      PT/INR - ( 27 Jan 2023 02:38 )   PT: 11.6 sec;   INR: 1.00 ratio         PTT - ( 27 Jan 2023 02:38 )  PTT:30.0 sec            RADIOLOGY & ADDITIONAL TESTS:  Results Reviewed:   Imaging Personally Reviewed:  Electrocardiogram Personally Reviewed:    COORDINATION OF CARE:  Care Discussed with Consultants/Other Providers [Y/N]:  Prior or Outpatient Records Reviewed [Y/N]:

## 2023-01-27 NOTE — PROGRESS NOTE ADULT - ASSESSMENT
62M EtoH misuse c/b chronic pancreatitis, HCV s/p treatment (SVR), emphysema, benign esophageal stricture (s/p stent on 4/2022) admitted at [facility] w/ septic shock septic shock due to pseudomonal PNA, lung abscess, Morganella bacteremia and Klebsiella UTI (now s/p 6 wks zosyn ended 1/6) w/ hospital course c/b YUNIEL on CKD requiring HD and MICU stay, transferred here for intractable nausea/vomiting and esophageal stent removal, now s/p removal w/ EGD sig for esophagitis/gastric ulcer. Hospital course now c/b acute on chronic anemia required multiple transfusion. Now patient is noted to have skin breakdown in Right toes concerning for underline infection secondary to vascular insufficiency. Plan for repeat angiogram, and potential repeat  pop-PT bypass surgery.  62M EtoH misuse c/b chronic pancreatitis, HCV s/p treatment (SVR), emphysema, benign esophageal stricture (s/p stent on 4/2022) admitted at [facility] w/ septic shock septic shock due to pseudomonal PNA, lung abscess, Morganella bacteremia and Klebsiella UTI (now s/p 6 wks zosyn ended 1/6) w/ hospital course c/b YUNIEL on CKD requiring HD and MICU stay, transferred here for intractable nausea/vomiting and esophageal stent removal, now s/p removal w/ EGD sig for esophagitis/gastric ulcer. Hospital course now c/b acute on chronic anemia required multiple transfusion. Now patient is noted to have skin breakdown in Right toes concerning for underline infection secondary to vascular insufficiency. Plan for repeat angiogram, and potential repeat  pop-PT bypass surgery. Course c.b by Right UE DVT, limiting IV access.

## 2023-01-27 NOTE — PROGRESS NOTE ADULT - PROBLEM SELECTOR PLAN 8
Septic shock due to pseudomonal PNA with lung abscess, morganella bacteremia and kelbsiella UTI.  Was on Zosyn since 11/25, [facility] 4 week course of antibiotics versus possibly longer pending imaging at 4 weeks (12/23)  - CXR 12/23 - Stable RUL lesion, with new RLL pneumonia   - seen by ID  - completed 6 wks zosyn 1/6  - per d/w ID no further imaging as 6 wks abx sufficient treatment

## 2023-01-27 NOTE — CHART NOTE - NSCHARTNOTEFT_GEN_A_CORE
Reason for Follow-Up Assessment: Severe Malnutrition    62M hx esophageal stricture, chronic pancreatitis, originally presented with n/v, esophageal stent removal in December, course c/b septic shock. Patient s/p first stage left brachiobasilic AVF on 1/5. Vascular surgery reconsulted in setting of bilateral foot wounds and reported nonpalpable pulses. S/p RLE angio on 1/20 with PT plasty, now planning for possible pop-PT bypass. Pt. now planned for HD in setting of continued metabolic acidosis, tunneled catheter placement with IR today - plan for OR next week once patient optimized with HD.    Source: [ ] Patient [ ] Family [ ] RN [ X ] Chart      Diet, NPO:   NPO for Procedure/Test     NPO Start Date: 26-Jan-2023,   NPO Start Time: 23:59 (01-26-23 @ 16:56)  Diet, Regular:   For patients receiving Renal Replacement - No Protein Restr, No Conc K, No Conc Phos, Low Sodium (RENAL)  No Caffeine  Supplement Feeding Modality:  Oral  Suplena Cans or Servings Per Day:  2       Frequency:  Daily (01-14-23 @ 13:58)    GI: Abdominal discomfort, fecal incontinence noted    PO intake:  [ ] Poor < 50%  [ ] Fair 50-75% [ ] Good  % [X] Variable PO intake noted, currently NPO for procedure/test    Enteral /Parenteral Nutrition:       [ X ] n/a    Anthropometrics: Height (cm): 188 (01-20), 177.8 (11-25)  Weight (kg): 55.9 (01-20), 42.3 (11-25)  BMI (kg/m2): 15.8 (01-20), 17.7 (01-20), 13.4 (11-25)    Weight Hx: 1/20 - 55.9kg      1/11 - 55.9kg      1/5 - 59.1kg      12/16/22 - 59.1kg (dosing weights)    Edema: 3+ edema to Rt arm, 1+ edema to lt and rt foot - see nursing flowsheets    Pressure Injuries: Stage II Rt buttock - see nursing flowsheets    _______________ Pertinent Medications_______________  MEDICATIONS  (STANDING):  albuterol/ipratropium for Nebulization. 3 milliLiter(s) Nebulizer once  cadexomer iodine 0.9% Gel 1 Application(s) Topical daily  calcitriol   Capsule 0.5 MICROGram(s) Oral daily  epoetin mejia-epbx (RETACRIT) Injectable 46613 Unit(s) SubCutaneous <User Schedule>  folic acid 1 milliGRAM(s) Oral daily  metoclopramide 5 milliGRAM(s) Oral three times a day  multivitamin 1 Tablet(s) Oral daily  NIFEdipine XL 30 milliGRAM(s) Oral daily  pancrelipase  (CREON  6,000 Lipase Units) 1 Capsule(s) Oral three times a day with meals  pantoprazole    Tablet 40 milliGRAM(s) Oral every 12 hours  polyethylene glycol 3350 17 Gram(s) Oral two times a day  senna 2 Tablet(s) Oral at bedtime  sevelamer carbonate 800 milliGRAM(s) Oral three times a day  sodium bicarbonate 1625 milliGRAM(s) Oral three times a day  sucralfate 1 Gram(s) Oral every 6 hours  thiamine 100 milliGRAM(s) Oral daily    MEDICATIONS  (PRN):  bisacodyl 5 milliGRAM(s) Oral every 12 hours PRN Constipation  ondansetron Injectable 4 milliGRAM(s) IV Push every 6 hours PRN Nausea and/or Vomiting  oxyCODONE    IR 10 milliGRAM(s) Oral every 4 hours PRN Moderate Pain (4 - 6)-Severe pain (7-10)  tiZANidine 2 milliGRAM(s) Oral every 8 hours PRN Muscle Cramps      __________________ Pertinent Labs__________________   01-27 Na141 mmol/L Glu 94 mg/dL K+ 3.7 mmol/L Cr  7.18 mg/dL<H>  mg/dL<H> 01-27 Phos 6.5 mg/dL<H>  POCT Blood Glucose.: 105 mg/dL (01-27-23 @ 07:36)    CAPILLARY BLOOD GLUCOSE  POCT Blood Glucose.: 105 mg/dL (27 Jan 2023 07:36)    Estimated Needs:   [ X ] no change since previous assessment  [ ] recalculated:     Previous Nutrition Diagnosis: Severe Malnutrition, Altered Nutrition Related Labs    Nutrition Diagnosis is [ ] ongoing  [ ] resolved [ ] not applicable     New Nutrition Diagnosis: Increased Nutrient Needs (Calories and Protein)related to increased physiological demands as evidenced by plans to initiate HD.    Monitoring and Evaluation:     [ x ] Tolerance to diet prescription / adequacy of meal intake  [ x ] Weight trends   [ x ] Pertinent labs  [ x ] Other:    Recommendations:  1) Diet / Supplement Changes: Suggest continuing RENAL Replacement; change supplement to Nepro 2x daily (850 kcals, 38.2g protein) given plans to initiate HD today.  2) Obtain and record current weights to best monitor for acute changes in nutritional status.  3) Please consistently document % meal intake in nursing flowsheets.     Nisha Nelson, MS, RDN, CDN  Pager [phone]  Also available on MS Teams Reason for Follow-Up Assessment: Severe Malnutrition    62M hx esophageal stricture, chronic pancreatitis, originally presented with n/v, esophageal stent removal in December, course c/b septic shock. Patient s/p first stage left brachiobasilic AVF on 1/5. Vascular surgery reconsulted in setting of bilateral foot wounds and reported nonpalpable pulses. S/p RLE angio on 1/20 with PT plasty, now planning for possible pop-PT bypass. Pt. now planned for HD in setting of continued metabolic acidosis, tunneled catheter placement with IR today - plan for OR next week once patient optimized with HD.     Met with patient this AM. Patient did not have any nutritional questions or concerns. Patient appeared tired. Deferred diet education at this time.      Source: [ X ] Patient [ ] Family [ ] RN [ X ] Chart      Diet, NPO:   NPO for Procedure/Test     NPO Start Date: 26-Jan-2023,   NPO Start Time: 23:59 (01-26-23 @ 16:56)  Diet, Regular:   For patients receiving Renal Replacement - No Protein Restr, No Conc K, No Conc Phos, Low Sodium (RENAL)  No Caffeine  Supplement Feeding Modality:  Oral  Suplena Cans or Servings Per Day:  2       Frequency:  Daily (01-14-23 @ 13:58)    GI: Abdominal discomfort, fecal incontinence noted    PO intake:  [ ] Poor < 50%  [ ] Fair 50-75% [ ] Good  % [X] Variable PO intake noted, currently NPO for procedure/test    Enteral /Parenteral Nutrition:       [ X ] n/a    Anthropometrics: Height (cm): 188 (01-20), 177.8 (11-25)  Weight (kg): 55.9 (01-20), 42.3 (11-25)  BMI (kg/m2): 15.8 (01-20), 17.7 (01-20), 13.4 (11-25)    Weight Hx: 1/20 - 55.9kg      1/11 - 55.9kg      1/5 - 59.1kg      12/16/22 - 59.1kg (dosing weights)    Edema: 3+ edema to Rt arm, 1+ edema to lt and rt foot - see nursing flowsheets    Pressure Injuries: Stage II Rt buttock - see nursing flowsheets    _______________ Pertinent Medications_______________  MEDICATIONS  (STANDING):  albuterol/ipratropium for Nebulization. 3 milliLiter(s) Nebulizer once  cadexomer iodine 0.9% Gel 1 Application(s) Topical daily  calcitriol   Capsule 0.5 MICROGram(s) Oral daily  epoetin mejia-epbx (RETACRIT) Injectable 57283 Unit(s) SubCutaneous <User Schedule>  folic acid 1 milliGRAM(s) Oral daily  metoclopramide 5 milliGRAM(s) Oral three times a day  multivitamin 1 Tablet(s) Oral daily  NIFEdipine XL 30 milliGRAM(s) Oral daily  pancrelipase  (CREON  6,000 Lipase Units) 1 Capsule(s) Oral three times a day with meals  pantoprazole    Tablet 40 milliGRAM(s) Oral every 12 hours  polyethylene glycol 3350 17 Gram(s) Oral two times a day  senna 2 Tablet(s) Oral at bedtime  sevelamer carbonate 800 milliGRAM(s) Oral three times a day  sodium bicarbonate 1625 milliGRAM(s) Oral three times a day  sucralfate 1 Gram(s) Oral every 6 hours  thiamine 100 milliGRAM(s) Oral daily    MEDICATIONS  (PRN):  bisacodyl 5 milliGRAM(s) Oral every 12 hours PRN Constipation  ondansetron Injectable 4 milliGRAM(s) IV Push every 6 hours PRN Nausea and/or Vomiting  oxyCODONE    IR 10 milliGRAM(s) Oral every 4 hours PRN Moderate Pain (4 - 6)-Severe pain (7-10)  tiZANidine 2 milliGRAM(s) Oral every 8 hours PRN Muscle Cramps      __________________ Pertinent Labs__________________   01-27 Na141 mmol/L Glu 94 mg/dL K+ 3.7 mmol/L Cr  7.18 mg/dL<H>  mg/dL<H> 01-27 Phos 6.5 mg/dL<H>  POCT Blood Glucose.: 105 mg/dL (01-27-23 @ 07:36)    CAPILLARY BLOOD GLUCOSE  POCT Blood Glucose.: 105 mg/dL (27 Jan 2023 07:36)    Estimated Needs:   [ X ] no change since previous assessment  [ ] recalculated:     Previous Nutrition Diagnosis: Severe Malnutrition, Altered Nutrition Related Labs    Nutrition Diagnosis is [X] ongoing  [ ] resolved [ ] not applicable     New Nutrition Diagnosis: Increased Nutrient Needs (Calories and Protein)related to increased physiological demands as evidenced by plans to initiate HD.    Monitoring and Evaluation:     [ x ] Tolerance to diet prescription / adequacy of meal intake  [ x ] Weight trends   [ x ] Pertinent labs  [ x ] Other:    Recommendations:  1) Diet / Supplement Changes: Suggest continuing RENAL Replacement; change supplement to Nepro 2x daily (850 kcals, 38.2g protein) given plans to initiate HD today.  2) Obtain and record current weights to best monitor for acute changes in nutritional status.  3) Please consistently document % meal intake in nursing flowsheets.     Nisha Nelson MS, RDN, CDN  Pager [phone]  Also available on MS Teams

## 2023-01-27 NOTE — PROGRESS NOTE ADULT - ASSESSMENT
62M hx esophageal stricture, chronic pancreatitis, originally presented with n/v, esophageal stent removal in December, course c/b septic shock. Patient s/p first stage left brachiobasilic AVF on 1/5 with Dr. Oro. Vascular surgery reconsulted in setting of bilateral foot wounds and reported nonpalpable pulses. S/p RLE angio on 1/20 with PT plasty, now planning for possible pop-PT bypass    PLAN:  - Vein mapping completed, reviewed - will need repeat angiogram in OR with pedal access given small veins on duplex   - Fistula evaluated, no current intervention   - Pt. now planned for HD in setting of continued metabolic acidosis, tunneled catheter placement with IR today - will plan for OR next week once patient optimized with HD       C Team Surgery  #[id number]

## 2023-01-27 NOTE — PROGRESS NOTE ADULT - ATTENDING COMMENTS
YUNIEL on CKD 5  Hypocalcemia  Uremia    Will start HD today once catheter placed, monitor labs and volume status

## 2023-01-27 NOTE — PROGRESS NOTE ADULT - SUBJECTIVE AND OBJECTIVE BOX
SURGERY  Pager: #[phone]    INTERVAL EVENTS/SUBJECTIVE: Events reviewed, pt. planned for tunneled catheter today and initiation of HD.     ______________________________________________  OBJECTIVE:   T(C): 36.5 (01-27-23 @ 05:30), Max: 36.5 (01-26-23 @ 20:45)  HR: 89 (01-27-23 @ 05:30) (74 - 90)  BP: 130/54 (01-27-23 @ 05:30) (121/59 - 136/75)  RR: 16 (01-27-23 @ 05:30) (16 - 17)  SpO2: 100% (01-27-23 @ 05:30) (100% - 100%)  Wt(kg): --  CAPILLARY BLOOD GLUCOSE      POCT Blood Glucose.: 105 mg/dL (27 Jan 2023 07:36)    I&O's Detail      Physical Exam:  Gen: resting in bed comfortably in NAD  Chest: no increased WOB, regular inspiratory effort   Abdomen: Soft, nontender, nondistended with no rebound tenderness or guarding.   Vascular: L groin soft and flat, LE +DP/PT signals. LUE with palpable radial and ulnar pulses, sensation/motor intact. Palpable over AVF   Neuro: awake, alert  ______________________________________________  LABS:  CBC Full  -  ( 27 Jan 2023 02:38 )  WBC Count : 8.75 K/uL  RBC Count : 2.62 M/uL  Hemoglobin : 7.8 g/dL  Hematocrit : 25.1 %  Platelet Count - Automated : 252 K/uL  Mean Cell Volume : 95.8 fL  Mean Cell Hemoglobin : 29.8 pg  Mean Cell Hemoglobin Concentration : 31.1 gm/dL  Auto Neutrophil # : x  Auto Lymphocyte # : x  Auto Monocyte # : x  Auto Eosinophil # : x  Auto Basophil # : x  Auto Neutrophil % : x  Auto Lymphocyte % : x  Auto Monocyte % : x  Auto Eosinophil % : x  Auto Basophil % : x    01-27    141  |  113<H>  |  104<H>  ----------------------------<  94  3.7   |  11<L>  |  7.18<H>    Ca    7.7<L>      27 Jan 2023 02:38  Phos  6.5     01-27  Mg     1.60     01-27      _____________________________________________  RADIOLOGY:

## 2023-01-27 NOTE — PROGRESS NOTE ADULT - PROBLEM SELECTOR PLAN 4
Pt. with acidosis in setting of renal failure. Serum CO2 low at 11 today. Bicitra was discontinued as pt refuses to take. Continue with sodium bicarb tabs 1300 gm PO TID. Plan for HD as outlined above. Monitor SCO2.    If you have any questions, please feel free to contact me  Jeff Pete  Nephrology Fellow  [phone]/ [employer] Teams(Preferred)  (After 5pm or on weekends please page the on-call fellow)

## 2023-01-27 NOTE — PROGRESS NOTE ADULT - SUBJECTIVE AND OBJECTIVE BOX
Patient is a 62y old  Male who presents with a chief complaint of intractable N/V , esophageal stent removal (27 Jan 2023 08:00)       INTERVAL HPI/OVERNIGHT EVENTS:  Patient seen and evaluated at bedside.  Pt is resting comfortable in NAD. Denies N/V/F/C.    Allergies    Tylenol (Other)    Intolerances        Vital Signs Last 24 Hrs  T(C): 36.5 (27 Jan 2023 05:30), Max: 36.5 (26 Jan 2023 20:45)  T(F): 97.7 (27 Jan 2023 05:30), Max: 97.7 (26 Jan 2023 20:45)  HR: 89 (27 Jan 2023 05:30) (74 - 90)  BP: 130/54 (27 Jan 2023 05:30) (121/59 - 136/75)  BP(mean): --  RR: 16 (27 Jan 2023 05:30) (16 - 17)  SpO2: 100% (27 Jan 2023 05:30) (100% - 100%)    Parameters below as of 27 Jan 2023 05:30  Patient On (Oxygen Delivery Method): room air        LABS:                        7.8    8.75  )-----------( 252      ( 27 Jan 2023 02:38 )             25.1     01-27    141  |  113<H>  |  104<H>  ----------------------------<  94  3.7   |  11<L>  |  7.18<H>    Ca    7.7<L>      27 Jan 2023 02:38  Phos  6.5     01-27  Mg     1.60     01-27      PT/INR - ( 27 Jan 2023 02:38 )   PT: 11.6 sec;   INR: 1.00 ratio         PTT - ( 27 Jan 2023 02:38 )  PTT:30.0 sec    CAPILLARY BLOOD GLUCOSE      POCT Blood Glucose.: 105 mg/dL (27 Jan 2023 07:36)      Lower Extremity Physical Exam:    Vascular: DP/PT 0/4, B/L, CFT >3 seconds B/L, Temperature gradient warm to cool, B/L.   Neuro: Epicritic sensation intact to the level of digits, B/L.  Musculoskeletal/Ortho: unremarkable   Skin: Right IPJ hallux wound to subQ, fibrotic wound bed, right foot 2nd and 3rd digit medial DIPJ wound to bone with increased granulation, exposed distal phalanx to both digits, no malodor, necrotic periwound edges. Left foot heel  and 5th met base early DTI with no signs of infection.       RADIOLOGY & ADDITIONAL TESTS:

## 2023-01-27 NOTE — PROGRESS NOTE ADULT - PROBLEM SELECTOR PLAN 12
BP slightly elevated, intermittently refusing BP meds   - renal dopplers show left sided HESHAM 60-99%, vascular not recommending intervention   - c/w Nifedipine 30mg QD    #Acute DVT in UE  - Awaiting read from ultrasound  - Once IV access obtained, can start hep. gtt. Defer lovenox and DOAC at this time, given OR planning. Can start DOAC after no further plans for intervention   - Pt with limited access points, will likely plan for IV line to be placed in the OR vs. IR placed line   # DVT PPx  - Heparin SQ  PT --> CARMEN, pending insurance authorization BP slightly elevated, intermittently refusing BP meds   - renal dopplers show left sided HESHAM 60-99%, vascular not recommending intervention   - c/w Nifedipine 30mg QD    #Acute DVT in UE:   - Once IV access obtained, can start hep. gtt. Defer lovenox and DOAC at this time, given OR planning. Can start DOAC after no further plans for intervention   - Pt with limited access points, IR consulted for access - plan for central line placement on 1/27   # DVT PPx  - Holding Heparin SQ for procedure   PT --> CARMEN, pending insurance authorization

## 2023-01-27 NOTE — PROGRESS NOTE ADULT - SUBJECTIVE AND OBJECTIVE BOX
[facility] DIVISION OF KIDNEY DISEASES AND HYPERTENSION -- FOLLOW UP NOTE  --------------------------------------------------------------------------------  HPI: Pt is a 62-year-old Male with Hx of esophageal stricture (S/p stenting in 5/22), Emphysema, HCV, chronic pancreatitis who initially presented to [facility] on 11/25/22 for AMS. Pt was admitted to [facility] on 11/25 for AMS 2/2 Septic shock. Pt was initially intubated for acute respiratory failure and required pressors for hypotension. Pt's hospital course was complicated by YUNIEL/ATN requiring HD. Pt was extubated and was transferred to [facility] for esophageal stent removal by Dr. Erickson. Initial labs on this admission concerning for elevated Scr. Nephrology team following for YUNIEL on CKD.     On review of [facility], it was noted that Scr was elevated at 2.20 in September 2022. Scr on admission was significantly elevated at 8.94 on 11/25/22. Pt received 1st HD session on 11/26/22. Last HD session was done on 12/1/22. HD catheter was removed as kidney function was recovering. Scr was elevated at 5.28 on transfer from [facility] on 12/15/22. Scr remains elevated/stable at 5-6 range. Scr remains elevated/stable at 7.18 today.     Pt seen and evaluated bedside this morning. Pt reports feeling lethargic today. Denies any chest pain, shortness of breath, or dizziness.    PAST HISTORY  --------------------------------------------------------------------------------  No significant changes to PMH, PSH, FHx, SHx, unless otherwise noted    ALLERGIES & MEDICATIONS  --------------------------------------------------------------------------------  Allergies    Tylenol (Other)    Intolerances    Standing Inpatient Medications  albuterol/ipratropium for Nebulization. 3 milliLiter(s) Nebulizer once  cadexomer iodine 0.9% Gel 1 Application(s) Topical daily  calcitriol   Capsule 0.5 MICROGram(s) Oral daily  epoetin mejia-epbx (RETACRIT) Injectable 84542 Unit(s) SubCutaneous <User Schedule>  folic acid 1 milliGRAM(s) Oral daily  metoclopramide 5 milliGRAM(s) Oral three times a day  multivitamin 1 Tablet(s) Oral daily  NIFEdipine XL 30 milliGRAM(s) Oral daily  pancrelipase  (CREON  6,000 Lipase Units) 1 Capsule(s) Oral three times a day with meals  pantoprazole    Tablet 40 milliGRAM(s) Oral every 12 hours  polyethylene glycol 3350 17 Gram(s) Oral two times a day  senna 2 Tablet(s) Oral at bedtime  sevelamer carbonate 800 milliGRAM(s) Oral three times a day  sodium bicarbonate 1625 milliGRAM(s) Oral three times a day  sucralfate 1 Gram(s) Oral every 6 hours  thiamine 100 milliGRAM(s) Oral daily    PRN Inpatient Medications  bisacodyl 5 milliGRAM(s) Oral every 12 hours PRN  ondansetron Injectable 4 milliGRAM(s) IV Push every 6 hours PRN  oxyCODONE    IR 10 milliGRAM(s) Oral every 4 hours PRN  tiZANidine 2 milliGRAM(s) Oral every 8 hours PRN    REVIEW OF SYSTEMS  --------------------------------------------------------------------------------  Gen: No fevers , see HPI  Respiratory: No dyspnea  CV: No chest pain  GI: No abdominal pain  : No dysuria  MSK: No  edema  Neuro: no dizziness   All other systems were reviewed and are negative, except as noted.    VITALS/PHYSICAL EXAM  --------------------------------------------------------------------------------  T(C): 36.5 (01-27-23 @ 05:30), Max: 36.5 (01-26-23 @ 20:45)  HR: 89 (01-27-23 @ 05:30) (74 - 90)  BP: 130/54 (01-27-23 @ 05:30) (121/59 - 136/75)  RR: 16 (01-27-23 @ 05:30) (16 - 17)  SpO2: 100% (01-27-23 @ 05:30) (100% - 100%)  Wt(kg): --    Physical Exam:  Gen NAD  HEENT: no JVD  Pulm: CTABL  CV: S1S2,  Abd: Soft,   Ext: - edema B/L LE, B/L foot wound evident   Neuro: Awake and alert  Skin: Warm and dry  Vascular: LUE AVF +weak bruit		    LABS/STUDIES  --------------------------------------------------------------------------------              7.8    8.75  >-----------<  252      [01-27-23 @ 02:38]              25.1     141  |  113  |  104  ----------------------------<  94      [01-27-23 @ 02:38]  3.7   |  11  |  7.18        Ca     7.7     [01-27-23 @ 02:38]      Mg     1.60     [01-27-23 @ 02:38]      Phos  6.5     [01-27-23 @ 02:38]    PT/INR: PT 11.6 , INR 1.00       [01-27-23 @ 02:38]  PTT: 30.0       [01-27-23 @ 02:38]    Creatinine Trend:  SCr 7.18 [01-27 @ 02:38]  SCr 7.03 [01-26 @ 06:25]  SCr 6.88 [01-25 @ 06:03]  SCr 6.48 [01-24 @ 12:00]  SCr 6.47 [01-23 @ 06:02]    Iron 95, TIBC <125, %sat --      [01-16-23 @ 11:00]  Ferritin 311      [01-03-23 @ 06:12]  PTH -- (Ca --)      [12-22-22 @ 06:40]   290  Vitamin D (25OH) 10.4      [01-04-23 @ 06:45]  HbA1c 5.0      [03-22-19 @ 09:03]  TSH 1.380      [09-01-22 @ 08:58]  Lipid: chol 147, TG 88, HDL 59, LDL --      [09-01-22 @ 08:58]

## 2023-01-27 NOTE — PROGRESS NOTE ADULT - ASSESSMENT
62M with bilateral foot wounds.   - Patient seen and evaluated.  - Afebrile, WBC 8.75  - Right IPJ hallux wound to subQ, fibrotic wound bed, right foot 2nd and 3rd digit medial DIPJ wound to bone with increased granulation, exposed distal phalanx to both digits, no malodor, necrotic periwound edges. Left foot heel  and 5th met base early DTI with no signs of infection.   - Bilateral foot xray: Bilateral soft tissue swelling. No tracking soft tissue gas collections, radiopaque foreign bodies, or gross radiographic evidence for osteomyelitis.  - No wound culture necessary 2/2 to no acute signs of infection.   - BARRY/PVRs: RABI 0.66, RTBI 0.3, L BARRY 0.76, L TBI 0.63 small arterial disease b/l   - Discussed the importance of offloading bilateral feet with zflows to avoid developing wounds however patient refused again, at this time.  - s/p RLE angiogram with PT plasty, planning Pop-PT bypass; appreciated.  - Vasc planning RLE repeat angio week of 1/30 pending HD optimization appreciated.  - Pod plan local wound care pending vascular recs.   - Discussed with attending.

## 2023-01-28 NOTE — PROGRESS NOTE ADULT - ASSESSMENT
62M EtoH misuse c/b chronic pancreatitis, HCV s/p treatment (SVR), emphysema, benign esophageal stricture (s/p stent on 4/2022) admitted at [facility] w/ septic shock septic shock due to pseudomonal PNA, lung abscess, Morganella bacteremia and Klebsiella UTI (now s/p 6 wks zosyn ended 1/6) w/ hospital course c/b YUNIEL on CKD requiring HD and MICU stay, transferred here for intractable nausea/vomiting and esophageal stent removal, now s/p removal w/ EGD sig for esophagitis/gastric ulcer. Hospital course now c/b acute on chronic anemia required multiple transfusion. Now patient is noted to have skin breakdown in Right toes concerning for underline infection secondary to vascular insufficiency. Plan for repeat angiogram, and potential repeat  pop-PT bypass surgery. Course c.b by Right UE DVT, limiting IV access.

## 2023-01-28 NOTE — PROGRESS NOTE ADULT - PROBLEM SELECTOR PLAN 2
Pt. with anemia in setting of renal failure. Received IV iron. Currently on Epo 10K three times a week. Blood transfusion per primary team. Monitor Hb.

## 2023-01-28 NOTE — PROGRESS NOTE ADULT - PROBLEM SELECTOR PLAN 1
b/l heel wounds and right 2nd and 3rd toe wound.   Podiatry concern for deep infection and possible need for amputation.   - wound was seen with wound care at bedside, discussed care and per their evaluation, this is slight improved from last evaluation 1 week prior.   - BARRY/PVR result reviewed; mild stenosis in LLE, moderate stenosis in RLE.   - Xray foot negative for signs of OM  - C/w local wound care by Podiatry  - S/p RLE angio by vascular on 1/20 with PT plasty, possible need for pop-PT bypass - plan for next week   - B/L LE vein mapping-No evidence of thrombosis or significant venous wall thickening noted within the right and left great saphenous and short saphenous veins  - Appreciate cards clearance on 1/26  - F/up further Vascular recs

## 2023-01-28 NOTE — PROGRESS NOTE ADULT - PROBLEM SELECTOR PLAN 9
Septic shock due to pseudomonal PNA with lung abscess, morganella bacteremia and kelbsiella UTI.  [facility]25[facility] antibiotics versus possibly longer pending imaging at 4 weeks (12/23)  - CXR 12/23 - Stable RUL lesion, with new RLL pneumonia   - seen by ID  - completed 6 wks zosyn 1/6  - per d/w ID no further imaging as 6 wks abx sufficient treatment

## 2023-01-28 NOTE — PROGRESS NOTE ADULT - PROBLEM SELECTOR PLAN 2
- S/p HD X 3  in ICU in [facility], Cr plateaued at 5.7-6  - s/p AVF creation on this admission as per d/w OP nephrologist baseline Cr is 3-CKD stage 4-5 and nearing ESRD  - [facility] renal duplex with severe stenosis of the left renal artery; vascular consulted, no plan for intervention for HESHAM  - sevelamer 800 mg TID, bicarb 1300 mg TID; calcitriol increased to 0.5 for secondary hyperparathyroidism per renal  - s/p AVF w/ vascular on 1/5 to the L arm, L arm precautions. Vascular following. Will need OP follow up   - avoid nephrotoxic  - renally dose meds  - trend Cr and electrolytes closely  - nephrology recs appreciated, catheter placed on 1/27, plan for dialysis on 1/28 via catheter.   - mgmt of HAGMA bicarb per nephrology

## 2023-01-28 NOTE — PROGRESS NOTE ADULT - PROBLEM SELECTOR PLAN 1
Pt with YUNIEL on CKD likely multifactorial in the setting of recent COVID infection, poor oral intake. Scr was elevated at 2.20 in September 2022. Pt was recently admitted at [facility]. Scr on admission was significantly elevated at 8.94 on 11/25/22. Pt received 1st HD session on 11/26/22. Last HD session was done on 12/1/22. HD catheter was removed as kidney function was recovering. Scr was elevated at 5.28 on transfer from [facility] on 12/15/22. Scr remained elevated/stable at 5.40 on 1/5/23. Of note kidney sonogram had discrepant size renal artery duplex showing HESHAM but BP well controlled. Discussed with Dr. Dukes (12/21) who knows the patient very well and has seen him inpatient and outpatient over the last several years. As per Dr. Dukes the patient's baseline creatinine is 3 and has advanced CKD and is nearing ESKD. Pt underwent cardiac cath on 1/4/23 , tolerated procedure well. Underwent LUE AVF creation on 1/5/23. Pt with uremic symptoms now. Pt underwent HD catheter insertion on 1/27/23. Plan for HD today. Monitor labs and urine output. Avoid any potential nephrotoxins. Dose medications as per eGFR.

## 2023-01-28 NOTE — PROGRESS NOTE ADULT - PROBLEM SELECTOR PLAN 4
in setting of kidney failure. Serum phosphorus levels above target range. Continue phos binders. Low phosphorus diet. Monitor serum phos.

## 2023-01-28 NOTE — PROGRESS NOTE ADULT - ATTENDING COMMENTS
1. YUNIEL on CKD - AVF placed 1/5 and catheter 1/27.  For dialysis today via catheter.   2. Anemia - EMMANUEL as ordered.  Titrate to course.  3. Hyperphosphatemia - on Sevelamer.  Titrate to course.  4. Metabolic acidosis - on oral bicarbonate.  Continued use per sequential labs now that dialysis to be started.  5. Hypertension - monitor on current regimen.

## 2023-01-28 NOTE — PROGRESS NOTE ADULT - PROBLEM SELECTOR PLAN 3
Pt. with acidosis in setting of renal failure. Serum CO2 low at 11 today. Bicitra was discontinued as pt refuses to take. Continue with sodium bicarb tabs 1300 gm PO TID. Plan for HD as outlined above. Monitor SCO2.

## 2023-01-28 NOTE — PROGRESS NOTE ADULT - SUBJECTIVE AND OBJECTIVE BOX
Ilya Marcum MD  [facility] Division of Hospital Medicine  Pager: [phone]  Available via MS Teams    SUBJECTIVE / OVERNIGHT EVENTS:    No new complaints     MEDICATIONS  (STANDING):  albuterol/ipratropium for Nebulization. 3 milliLiter(s) Nebulizer once  cadexomer iodine 0.9% Gel 1 Application(s) Topical daily  calcitriol   Capsule 0.5 MICROGram(s) Oral daily  chlorhexidine 4% Liquid 1 Application(s) Topical <User Schedule>  epoetin mejia-epbx (RETACRIT) Injectable 58134 Unit(s) SubCutaneous <User Schedule>  folic acid 1 milliGRAM(s) Oral daily  guaiFENesin  milliGRAM(s) Oral every 12 hours  metoclopramide 5 milliGRAM(s) Oral three times a day  multivitamin 1 Tablet(s) Oral daily  NIFEdipine XL 30 milliGRAM(s) Oral daily  pancrelipase  (CREON  6,000 Lipase Units) 1 Capsule(s) Oral three times a day with meals  pantoprazole    Tablet 40 milliGRAM(s) Oral every 12 hours  polyethylene glycol 3350 17 Gram(s) Oral two times a day  senna 2 Tablet(s) Oral at bedtime  sevelamer carbonate 800 milliGRAM(s) Oral three times a day  sodium bicarbonate 1625 milliGRAM(s) Oral three times a day  sucralfate 1 Gram(s) Oral every 6 hours  thiamine 100 milliGRAM(s) Oral daily    MEDICATIONS  (PRN):  bisacodyl 5 milliGRAM(s) Oral every 12 hours PRN Constipation  ondansetron Injectable 4 milliGRAM(s) IV Push every 6 hours PRN Nausea and/or Vomiting  oxyCODONE    IR 10 milliGRAM(s) Oral every 4 hours PRN Moderate Pain (4 - 6)-Severe pain (7-10)  sodium chloride 0.9% lock flush 10 milliLiter(s) IV Push every 1 hour PRN Pre/post blood products, medications, blood draw, and to maintain line patency  tiZANidine 2 milliGRAM(s) Oral every 8 hours PRN Muscle Cramps      I&O's Summary    27 Jan 2023 07:01  -  28 Jan 2023 07:00  --------------------------------------------------------  IN: 350 mL / OUT: 200 mL / NET: 150 mL    28 Jan 2023 07:01  -  28 Jan 2023 15:00  --------------------------------------------------------  IN: 0 mL / OUT: 350 mL / NET: -350 mL        PHYSICAL EXAM:  Vital Signs Last 24 Hrs  T(C): 36.7 (28 Jan 2023 10:00), Max: 37.1 (27 Jan 2023 15:05)  T(F): 98.1 (28 Jan 2023 10:00), Max: 98.7 (27 Jan 2023 15:05)  HR: 87 (28 Jan 2023 10:00) (83 - 90)  BP: 147/77 (28 Jan 2023 10:00) (120/67 - 147/77)  BP(mean): --  RR: 17 (28 Jan 2023 10:00) (16 - 18)  SpO2: 100% (28 Jan 2023 10:00) (100% - 100%)    Parameters below as of 28 Jan 2023 10:00  Patient On (Oxygen Delivery Method): room air      CONSTITUTIONAL: NAD, cachetic   EYES: conjunctiva and sclera clear  ENMT: Moist oral mucosa   NECK: Supple   RESPIRATORY: Normal respiratory effort; lungs are clear to auscultation bilaterally  CARDIOVASCULAR: Regular rate and rhythm, normal S1 and S2, no murmur/rub/gallop; Peripheral pulses are 2+ bilaterally  ABDOMEN: Nontender to palpation, normoactive bowel sounds, no rebound/guarding   MUSCULOSKELETAL: No lower extremity edema   PSYCH: A+O to person, place, and time; affect appropriate  NEUROLOGY: no gross sensory or motor deficits   SKIN: No rashes    LABS:                        7.7    8.61  )-----------( 246      ( 28 Jan 2023 06:12 )             24.7     01-28    140  |  112<H>  |  103<H>  ----------------------------<  84  3.6   |  12<L>  |  6.72<H>    Ca    7.9<L>      28 Jan 2023 06:12  Phos  6.0     01-28  Mg     1.40     01-28      PT/INR - ( 27 Jan 2023 02:38 )   PT: 11.6 sec;   INR: 1.00 ratio         PTT - ( 28 Jan 2023 06:12 )  PTT:31.9 sec          SARS-CoV-2: NotDetec (26 Jan 2023 18:21)  COVID-19 PCR: NotDetec (24 Jan 2023 13:56)  COVID-19 PCR: NotDetec (19 Jan 2023 16:45)  COVID-19 PCR: NotDetec (16 Jan 2023 20:50)  COVID-19 PCR: NotDetec (09 Jan 2023 04:14)  COVID-19 PCR: Detected (08 Jan 2023 18:30)  COVID-19 PCR: Detected (06 Jan 2023 10:58)  COVID-19 PCR: NotDetec (03 Jan 2023 19:30)  COVID-19 PCR: Detected (29 Dec 2022 17:57)  SARS-CoV-2: NotDetec (25 Nov 2022 01:46)      RADIOLOGY & ADDITIONAL TESTS:  Other Results Reviewed Today: BMP with stable Cr, CBC with stable Hg     COORDINATION OF CARE:  Communication: discussed plan of care with ACP

## 2023-01-28 NOTE — PROGRESS NOTE ADULT - PROBLEM SELECTOR PLAN 3
#Acute DVT in UE:   - due to difficulty with access, triple lumen was placed by IR on 1/27   - begin hep ggt   - plan to transition to DOAC on DC after completes all procedures/surgeries

## 2023-01-28 NOTE — PROGRESS NOTE ADULT - SUBJECTIVE AND OBJECTIVE BOX
[facility] DIVISION OF KIDNEY DISEASES AND HYPERTENSION   FOLLOW UP NOTE    --------------------------------------------------------------------------------  HPI: Pt is a 62-year-old Male with Hx of esophageal stricture (S/p stenting in 5/22), Emphysema, HCV, chronic pancreatitis who initially presented to [facility] on 11/25/22 for AMS. Pt was admitted to [facility] on 11/25 for AMS 2/2 Septic shock. Pt was initially intubated for acute respiratory failure and required pressors for hypotension. Pt's hospital course was complicated by YUNIEL/ATN requiring HD. Pt was extubated and was transferred to [facility] for esophageal stent removal by Dr. Erickson. Initial labs on this admission concerning for elevated Scr. Nephrology team following for YUNIEL on CKD.     On review of [facility], it was noted that Scr was elevated at 2.20 in September 2022. Scr on admission was significantly elevated at 8.94 on 11/25/22. Pt received 1st HD session on 11/26/22. Last HD session was done on 12/1/22. HD catheter was removed as kidney function was recovering. Scr was elevated at 5.28 on transfer from [facility] on 12/15/22. Scr remains elevated/stable at 5-6 range. Pt underwent left non tunneled HD catheter insertion on 1/27/23. Scr remains elevated/stable at 6.72 today with BUN of 103.    Pt seen and evaluated bedside this morning. Pt reports feeling tired today. Denies any chest pain, shortness of breath, or dizziness.     PAST HISTORY  --------------------------------------------------------------------------------  No significant changes to PMH, PSH, FHx, SHx, unless otherwise noted    ALLERGIES & MEDICATIONS  --------------------------------------------------------------------------------  Allergies    Tylenol (Other)    Intolerances      Standing Inpatient Medications  albuterol/ipratropium for Nebulization. 3 milliLiter(s) Nebulizer once  cadexomer iodine 0.9% Gel 1 Application(s) Topical daily  calcitriol   Capsule 0.5 MICROGram(s) Oral daily  chlorhexidine 4% Liquid 1 Application(s) Topical <User Schedule>  epoetin mejia-epbx (RETACRIT) Injectable 61712 Unit(s) SubCutaneous <User Schedule>  folic acid 1 milliGRAM(s) Oral daily  guaiFENesin  milliGRAM(s) Oral every 12 hours  metoclopramide 5 milliGRAM(s) Oral three times a day  multivitamin 1 Tablet(s) Oral daily  NIFEdipine XL 30 milliGRAM(s) Oral daily  pancrelipase  (CREON  6,000 Lipase Units) 1 Capsule(s) Oral three times a day with meals  pantoprazole    Tablet 40 milliGRAM(s) Oral every 12 hours  polyethylene glycol 3350 17 Gram(s) Oral two times a day  senna 2 Tablet(s) Oral at bedtime  sevelamer carbonate 800 milliGRAM(s) Oral three times a day  sodium bicarbonate 1625 milliGRAM(s) Oral three times a day  sucralfate 1 Gram(s) Oral every 6 hours  thiamine 100 milliGRAM(s) Oral daily    PRN Inpatient Medications  bisacodyl 5 milliGRAM(s) Oral every 12 hours PRN  ondansetron Injectable 4 milliGRAM(s) IV Push every 6 hours PRN  oxyCODONE    IR 10 milliGRAM(s) Oral every 4 hours PRN  sodium chloride 0.9% lock flush 10 milliLiter(s) IV Push every 1 hour PRN  tiZANidine 2 milliGRAM(s) Oral every 8 hours PRN      REVIEW OF SYSTEMS  --------------------------------------------------------------------------------  Gen: No fevers , see HPI  Respiratory: No dyspnea  CV: No chest pain  GI: No abdominal pain  : No dysuria  MSK: No  edema  Neuro: no dizziness    VITALS/PHYSICAL EXAM  --------------------------------------------------------------------------------  T(C): 36.7 (01-28-23 @ 10:00), Max: 37.1 (01-27-23 @ 14:30)  HR: 87 (01-28-23 @ 10:00) (83 - 90)  BP: 147/77 (01-28-23 @ 10:00) (120/67 - 147/77)  RR: 17 (01-28-23 @ 10:00) (16 - 18)  SpO2: 100% (01-28-23 @ 10:00) (98% - 100%)  Wt(kg): --    01-27-23 @ 07:01  -  01-28-23 @ 07:00  --------------------------------------------------------  IN: 350 mL / OUT: 200 mL / NET: 150 mL    01-28-23 @ 07:01  -  01-28-23 @ 13:40  --------------------------------------------------------  IN: 0 mL / OUT: 350 mL / NET: -350 mL      Physical Exam:  Gen NAD  HEENT: no JVD  Pulm: CTABL  CV: S1S2,  Abd: Soft,   Ext: no edema B/L LE, B/L foot wound evident   Neuro: Awake and alert  Skin: Warm and dry  Dialysis access:  LUE AVF +weak bruit, left IJ non tunneled HD catheter with dressing seen      LABS/STUDIES  --------------------------------------------------------------------------------              7.7    8.61  >-----------<  246      [01-28-23 @ 06:12]              24.7     140  |  112  |  103  ----------------------------<  84      [01-28-23 @ 06:12]  3.6   |  12  |  6.72        Ca     7.9     [01-28-23 @ 06:12]      Mg     1.40     [01-28-23 @ 06:12]      Phos  6.0     [01-28-23 @ 06:12]      Creatinine Trend:  SCr 6.72 [01-28 @ 06:12]  SCr 7.18 [01-27 @ 02:38]  SCr 7.03 [01-26 @ 06:25]  SCr 6.88 [01-25 @ 06:03]  SCr 6.48 [01-24 @ 12:00]

## 2023-01-28 NOTE — CHART NOTE - NSCHARTNOTEFT_GEN_A_CORE
Medicine NP note Medicine NP note    Notified in am by RN that left IJ access dressing is slightly oozing/ bleeding  , asked RN to apply pressure to area, provider examined pt at bedside , no distress noted , bleeding stopped after 5 minutes applying pressure , patient instructed to stay in bed and do not walking , dressing changed and monitored ,over the day reinforced with occlusive dressing since patient was walking to bathroom and reaching under the bed, no active bleeding noted after reinforcing.  . Afternoon reported by RN that pt has slight bleeding from wound on right heel ,pt is followed for dressing changed by Podiatry, dressing reinforced by RN , bleeding stopped , Podiatry notified , as per discussion they would see patient today . Attending Dr. Marcum aware of all situation happened ( morning time catheter site slight oozing / bleeding and right heel  slight oozing / bleeding afternoon ) . Both sites were intact and dry , no bleed noted when pt was leaving to HD at 4 pm . As per Attending pt is ok to start Heparin gtt patient specific with therapeutic PTT norm between 60 -70 since patient has tendency to oozing from triple lumen catheter . Endorsed to night provider to start Heparin gtt after pt will be back for HD     Kourtney Edwards NP-C  Department of Medicine- [facility]  In House Pager #[phone] Medicine NP note    Notified in am by RN that left IJ access dressing is slightly oozing/ bleeding  , asked RN to apply pressure to area, provider examined pt at bedside , no distress noted , bleeding stopped after 5 minutes applying pressure , patient instructed to stay in bed and do not walking , dressing changed and monitored ,over the day reinforced with occlusive dressing since patient was walking to bathroom and reaching under the bed, no active bleeding noted after reinforcing.  . Afternoon reported by RN that pt has slight bleeding from wound on right heel ,pt is followed for dressing changed by Podiatry, dressing reinforced by RN , bleeding stopped , Podiatry notified , as per discussion they would see patient today . Attending Dr. Marcum aware of all situation happened ( morning time catheter site slight oozing / bleeding and right heel  slight oozing / bleeding afternoon ) . Both sites were intact and dry , no bleed noted when pt was leaving to HD at 4 pm . As per Attending, after HD,  pt is ok to start Heparin gtt patient specific titration with therapeutic PTT 60- 70 due to tendency to bleed from triple lumen catheter . Coags and T and S to be done at Saturday's HD before starting Heparin gtt. Endorsed to night provider to start Heparin gtt after pt will be back from HD and pt won't have oozing/ bleeding     Kourtney Edwards NP-C  Department of Medicine- [facility]  In House Pager #[phone] Medicine NP note    Notified in am by RN that left IJ access dressing is slightly oozing/ bleeding  , asked RN to apply pressure to area, provider examined pt at bedside , no distress noted , bleeding stopped after 5 minutes applying pressure , patient instructed to stay in bed and do not walking , dressing changed and monitored ,over the day reinforced with occlusive dressing since patient was walking to bathroom and reaching under the bed, no active bleeding noted after reinforcing.  . Afternoon reported by RN that pt has slight bleeding from wound on right heel ,pt is followed for dressing changed by Podiatry, dressing reinforced by RN , bleeding stopped , Podiatry notified , as per discussion they would see patient today . Attending Dr. Marcum aware of all situation happened ( morning time catheter site slight oozing / bleeding and right heel  slight oozing / bleeding afternoon ) . Both sites were intact and dry , no bleed noted when pt was leaving to HD at 4 pm . As per Attending, after HD,  pt is ok to start Heparin gtt patient specific titration with therapeutic PTT 60- 70 due to tendency to bleed from triple lumen catheter . Coags and T and S to be done at Saturday's HD before starting Heparin gtt. Endorsed to night provider to start Heparin gtt after pt will be back from HD and pt won't have oozing/ bleeding .    Kourtney Edwards, NP-C  Department of Medicine- [facility]  In House Pager #[phone]
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
## 2023-01-29 NOTE — PROGRESS NOTE ADULT - ATTENDING COMMENTS
1. ESRD - tolerated HD 1/28.  Next treatment for 1/30.  2. Anemia - on EMMANUEL.  Transfusion per medicine team.  3. Hyperphosphatemia - as above, would hold binders for now given reduced level.  Continue to monitor levels.  4. Metabolic acidosis - discontinue oral bicarbonate.  5. Hypokalemia - replete as outlined above and monitor serial levels.  6. Hypertension - monitor on current regimen.

## 2023-01-29 NOTE — PROGRESS NOTE ADULT - PROBLEM SELECTOR PLAN 9
Septic shock due to pseudomonal PNA with lung abscess, morganella bacteremia and kelbsiella UTI.  Was [facility]25[facility] versus possibly longer pending imaging at 4 weeks (12/23)  - CXR 12/23 - Stable RUL lesion, with new RLL pneumonia   - seen by ID  - completed 6 wks zosyn 1/6  - per d/w ID no further imaging as 6 wks abx sufficient treatment

## 2023-01-29 NOTE — PROGRESS NOTE ADULT - PROBLEM SELECTOR PLAN 3
in setting of kidney failure. Serum phosphorus levels below target range. Pt on phos binders. Recommend discontinue sevelamer. Low phosphorus diet. Monitor serum phos.

## 2023-01-29 NOTE — PROGRESS NOTE ADULT - ASSESSMENT
62M EtoH misuse c/b chronic pancreatitis, HCV s/p treatment (SVR), emphysema, benign esophageal stricture (s/p stent on 4/2022) admitted at [facility] w/ septic shock septic shock due to pseudomonal PNA, lung abscess, Morganella bacteremia and Klebsiella UTI (now s/p 6 wks zosyn ended 1/6) w/ hospital course c/b YUNIEL on CKD requiring HD and MICU stay, transferred here for intractable nausea/vomiting and esophageal stent removal, now s/p removal w/ EGD sig for esophagitis/gastric ulcer. Hospital course now c/b acute on chronic anemia required multiple transfusion. Now patient is noted to have skin breakdown in Right toes concerning for underline infection secondary to vascular insufficiency. Plan for repeat angiogram, and potential repeat  pop-PT bypass surgery. Course c.b by Right UE DVT, limiting IV access.

## 2023-01-29 NOTE — PROGRESS NOTE ADULT - PROBLEM SELECTOR PLAN 2
- S/p HD X 3  in ICU in [facility], Cr plateaued at 5.7-6  - s/p AVF creation on this admission as per d/w OP nephrologist baseline Cr is 3-CKD stage 4-5 and nearing ESRD  - [facility] renal duplex with severe stenosis of the left renal artery; vascular consulted, no plan for intervention for HESHAM  - sevelamer 800 mg TID, bicarb 1300 mg TID; calcitriol increased to 0.5 for secondary hyperparathyroidism per renal  - s/p AVF w/ vascular on 1/5 to the L arm, L arm precautions. Vascular following. Will need OP follow up   - avoid nephrotoxic  - renally dose meds  - trend Cr and electrolytes closely  - mgmt of HAGMA bicarb per nephrology  - nephrology recs appreciated, catheter placed on 1/27, completed dialysis on 1/28 via catheter.    #Hypokalemia   - replete, recheck BMP at 3 PM  on 1/29

## 2023-01-29 NOTE — PROGRESS NOTE ADULT - SUBJECTIVE AND OBJECTIVE BOX
SURGERY  Pager: #[phone]    INTERVAL EVENTS/SUBJECTIVE: No acute events overnight. Pt. with noted oozing from LIJ access site, no new complaints.     ______________________________________________  OBJECTIVE:   T(C): 37 (01-28-23 @ 19:45), Max: 37 (01-28-23 @ 19:45)  HR: 97 (01-28-23 @ 19:45) (73 - 97)  BP: 160/68 (01-28-23 @ 19:45) (132/51 - 160/68)  RR: 18 (01-28-23 @ 19:45) (17 - 19)  SpO2: 100% (01-28-23 @ 10:00) (100% - 100%)  Wt(kg): --  CAPILLARY BLOOD GLUCOSE      POCT Blood Glucose.: 120 mg/dL (29 Jan 2023 07:52)    I&O's Detail    28 Jan 2023 07:01  -  29 Jan 2023 07:00  --------------------------------------------------------  IN:    Other (mL): 400 mL  Total IN: 400 mL    OUT:    Other (mL): 900 mL    Voided (mL): 350 mL  Total OUT: 1250 mL    Total NET: -850 mL          Physical Exam:  Gen: resting in bed comfortably in NAD  Chest: no increased WOB, regular inspiratory effort   Abdomen: Soft, nontender, nondistended with no rebound tenderness or guarding.   Vascular: L groin soft and flat, LE +DP/PT signals. LUE with palpable radial and ulnar pulses, sensation/motor intact. Palpable over AVF   Neuro: awake, alert  ______________________________________________  LABS:  CBC Full  -  ( 29 Jan 2023 05:42 )  WBC Count : 8.96 K/uL  RBC Count : 2.45 M/uL  Hemoglobin : 7.2 g/dL  Hematocrit : 23.6 %  Platelet Count - Automated : 220 K/uL  Mean Cell Volume : 96.3 fL  Mean Cell Hemoglobin : 29.4 pg  Mean Cell Hemoglobin Concentration : 30.5 gm/dL  Auto Neutrophil # : x  Auto Lymphocyte # : x  Auto Monocyte # : x  Auto Eosinophil # : x  Auto Basophil # : x  Auto Neutrophil % : x  Auto Lymphocyte % : x  Auto Monocyte % : x  Auto Eosinophil % : x  Auto Basophil % : x    01-29    140  |  108<H>  |  78<H>  ----------------------------<  98  2.9<LL>   |  19<L>  |  4.62<H>    Ca    7.7<L>      29 Jan 2023 06:50  Phos  2.9     01-29  Mg     1.40     01-29      _____________________________________________  RADIOLOGY:

## 2023-01-29 NOTE — PROGRESS NOTE ADULT - ASSESSMENT
62M hx esophageal stricture, chronic pancreatitis, originally presented with n/v, esophageal stent removal in December, course c/b septic shock. Patient s/p first stage left brachiobasilic AVF on 1/5 with Dr. Oro. Vascular surgery reconsulted in setting of bilateral foot wounds and reported nonpalpable pulses. S/p RLE angio on 1/20 with PT plasty, now planning for possible pop-PT bypass    PLAN:  - Vein mapping completed, reviewed - will need repeat angiogram in OR with pedal access given small veins on duplex   - Fistula evaluated, no current intervention   - Pt. now s/p tunneled catheter placement with IR, getting HD - will plan for OR next week, tentatively on schedule Friday    C Team Surgery  #[id number]

## 2023-01-29 NOTE — PROGRESS NOTE ADULT - PROBLEM SELECTOR PLAN 2
Pt. with anemia in setting of renal failure. Hemoglobin below target range. EMMANUEL with HD. Blood transfusion per primary team. Monitor Hb.

## 2023-01-29 NOTE — PROGRESS NOTE ADULT - PROBLEM SELECTOR PLAN 4
Serum K low at 2.9 on labs today. Recommend PO kcl 40 meq once. Discontinue sodium bicarbonate tabs. Recheck BMP later, Monitor serum K. discussed with primary team.

## 2023-01-29 NOTE — PROGRESS NOTE ADULT - PROBLEM SELECTOR PLAN 3
#Acute DVT in UE:   - due to difficulty with access placed by IR on 1/27   - begin hep ggt   - plan to transition to DOAC on DC after completes all procedures/surgeries  - trend cbcs

## 2023-01-29 NOTE — PROGRESS NOTE ADULT - SUBJECTIVE AND OBJECTIVE BOX
[facility] DIVISION OF KIDNEY DISEASES AND HYPERTENSION   FOLLOW UP NOTE    --------------------------------------------------------------------------------  HPI: Pt is a 62-year-old Male with Hx of esophageal stricture (S/p stenting in 5/22), Emphysema, HCV, chronic pancreatitis who initially presented to [facility] on 11/25/22 for AMS. Pt was admitted to [facility] on 11/25 for AMS 2/2 Septic shock. Pt was initially intubated for acute respiratory failure and required pressors for hypotension. Pt's hospital course was complicated by YUNIEL/ATN requiring HD. Pt was extubated and was transferred to [facility] for esophageal stent removal by Dr. Erickson. Initial labs on this admission concerning for elevated Scr. Pt being seen for YUNIEL on CKD.     On review of [facility], it was noted that Scr was elevated at 2.20 in September 2022. Scr on admission was significantly elevated at 8.94 on 11/25/22. Pt received 1st HD session on 11/26/22. Last HD session was done on 12/1/22. HD catheter was removed as kidney function was recovering. Scr was elevated at 5.28 on transfer from [facility] on 12/15/22. Scr remains elevated/stable at 5-6 range. Pt underwent left non tunneled HD catheter insertion on 1/27/23. Scr remains elevated/stable at 6.72 with BUN of 103 on 1/28, Pt reintiated on HD treatment on 1/28 and tolerated well.     Pt seen and evaluated bedside this morning. Pt reports feeling better. Denies any chest pain, shortness of breath, or dizziness. Serum K noted to be low at 2.9 on labs done today.    PAST HISTORY  --------------------------------------------------------------------------------  No significant changes to PMH, PSH, FHx, SHx, unless otherwise noted    ALLERGIES & MEDICATIONS  --------------------------------------------------------------------------------  Allergies    Tylenol (Other)    Intolerances      Standing Inpatient Medications  albuterol/ipratropium for Nebulization. 3 milliLiter(s) Nebulizer once  cadexomer iodine 0.9% Gel 1 Application(s) Topical daily  calcitriol   Capsule 0.5 MICROGram(s) Oral daily  chlorhexidine 4% Liquid 1 Application(s) Topical <User Schedule>  epoetin mejia-epbx (RETACRIT) Injectable 83423 Unit(s) SubCutaneous <User Schedule>  folic acid 1 milliGRAM(s) Oral daily  guaiFENesin  milliGRAM(s) Oral every 12 hours  heparin  Infusion 1000 Unit(s)/Hr IV Continuous <Continuous>  metoclopramide 5 milliGRAM(s) Oral three times a day  multivitamin 1 Tablet(s) Oral daily  NIFEdipine XL 30 milliGRAM(s) Oral daily  pancrelipase  (CREON  6,000 Lipase Units) 1 Capsule(s) Oral three times a day with meals  pantoprazole    Tablet 40 milliGRAM(s) Oral every 12 hours  polyethylene glycol 3350 17 Gram(s) Oral two times a day  senna 2 Tablet(s) Oral at bedtime  sevelamer carbonate 800 milliGRAM(s) Oral three times a day  sucralfate 1 Gram(s) Oral every 6 hours  thiamine 100 milliGRAM(s) Oral daily    PRN Inpatient Medications  bisacodyl 5 milliGRAM(s) Oral every 12 hours PRN  ondansetron Injectable 4 milliGRAM(s) IV Push every 6 hours PRN  oxyCODONE    IR 10 milliGRAM(s) Oral every 4 hours PRN  sodium chloride 0.9% lock flush 10 milliLiter(s) IV Push every 1 hour PRN  tiZANidine 2 milliGRAM(s) Oral every 8 hours PRN      REVIEW OF SYSTEMS  --------------------------------------------------------------------------------  Gen: No fevers , see HPI  Respiratory: No dyspnea  CV: No chest pain  GI: No abdominal pain  : No dysuria  MSK: No  edema  Neuro: no dizziness    VITALS/PHYSICAL EXAM  --------------------------------------------------------------------------------  T(C): 36.9 (01-29-23 @ 10:12), Max: 37 (01-28-23 @ 19:45)  HR: 95 (01-29-23 @ 10:12) (73 - 97)  BP: 155/69 (01-29-23 @ 10:12) (132/51 - 160/68)  RR: 18 (01-29-23 @ 10:12) (18 - 19)  SpO2: --  Wt(kg): -      01-28-23 @ 07:01  -  01-29-23 @ 07:00  --------------------------------------------------------  IN: 400 mL / OUT: 1250 mL / NET: -850 mL    Physical Exam:  Gen NAD  HEENT: no JVD  Pulm: CTABL  CV: S1S2,  Abd: Soft,   Ext: no edema B/L LE, B/L foot wound evident   Neuro: Awake and alert  Skin: Warm and dry  Dialysis access:  LUE AVF+ bruit, left IJ non tunneled HD (triple lumen) catheter with dressing seen    LABS/STUDIES  --------------------------------------------------------------------------------              7.2    8.96  >-----------<  220      [01-29-23 @ 05:42]              23.6     140  |  108  |  78  ----------------------------<  98      [01-29-23 @ 06:50]  2.9   |  19  |  4.62        Ca     7.7     [01-29-23 @ 06:50]      Mg     1.40     [01-29-23 @ 06:50]      Phos  2.9     [01-29-23 @ 06:50]    Creatinine Trend:  SCr 4.62 [01-29 @ 06:50]  SCr 4.65 [01-29 @ 05:42]  SCr 6.72 [01-28 @ 06:12]  SCr 7.18 [01-27 @ 02:38]  SCr 7.03 [01-26 @ 06:25]    HBsAb 10.9      [01-28-23 @ 17:41]  HBsAg Nonreact      [01-28-23 @ 17:41]  HBcAb Reactive      [01-28-23 @ 17:41]  HCV 8.31, Reactive      [01-28-23 @ 17:41]

## 2023-01-29 NOTE — PROGRESS NOTE ADULT - PROBLEM SELECTOR PLAN 4
Diff inclu  GIB iso PUD and medication non-adherence (intermittently refusing PPI/ Carafate) +/- anemia of chronic disease. Work up not consistent w/ hemolysis   -CT Abd pelvis w/o RP bleed  -Previous EGD 12/16/22 w/ esophagitis  -Repeat EGD 1/13 w/o active bleeding  -Colonoscopy 1/13 w/o active bleeding. W/ mucosal ulceration. Biopsied  - Bx-Colonic mucosa with active chronic colitis  -s/p 4 units PRBC on this admission  -c/w PPI BID  -C/w Carafate 1G Q6H  -Outpatient GI follow up w/ repeat upper endoscopy in 6-8 weeks and colonoscopy  - keep type and screen active, if <7, will need another unit

## 2023-01-29 NOTE — PROGRESS NOTE ADULT - SUBJECTIVE AND OBJECTIVE BOX
Ilya Marcum MD  [facility] Division of Hospital Medicine  Pager: [phone]  Available via MS Teams    SUBJECTIVE / OVERNIGHT EVENTS:    Complains of epigastric abdominal pain. Chronic        MEDICATIONS  (STANDING):  albuterol/ipratropium for Nebulization. 3 milliLiter(s) Nebulizer once  cadexomer iodine 0.9% Gel 1 Application(s) Topical daily  calcitriol   Capsule 0.5 MICROGram(s) Oral daily  chlorhexidine 4% Liquid 1 Application(s) Topical <User Schedule>  epoetin mejia-epbx (RETACRIT) Injectable 14903 Unit(s) SubCutaneous <User Schedule>  folic acid 1 milliGRAM(s) Oral daily  guaiFENesin  milliGRAM(s) Oral every 12 hours  heparin  Infusion 1000 Unit(s)/Hr (10 mL/Hr) IV Continuous <Continuous>  metoclopramide 5 milliGRAM(s) Oral three times a day  multivitamin 1 Tablet(s) Oral daily  NIFEdipine XL 30 milliGRAM(s) Oral daily  pancrelipase  (CREON  6,000 Lipase Units) 1 Capsule(s) Oral three times a day with meals  pantoprazole    Tablet 40 milliGRAM(s) Oral every 12 hours  polyethylene glycol 3350 17 Gram(s) Oral two times a day  potassium chloride   Powder 40 milliEquivalent(s) Oral once  senna 2 Tablet(s) Oral at bedtime  sevelamer carbonate 800 milliGRAM(s) Oral three times a day  sucralfate 1 Gram(s) Oral every 6 hours  thiamine 100 milliGRAM(s) Oral daily    MEDICATIONS  (PRN):  bisacodyl 5 milliGRAM(s) Oral every 12 hours PRN Constipation  ondansetron Injectable 4 milliGRAM(s) IV Push every 6 hours PRN Nausea and/or Vomiting  oxyCODONE    IR 10 milliGRAM(s) Oral every 4 hours PRN Moderate Pain (4 - 6)-Severe pain (7-10)  sodium chloride 0.9% lock flush 10 milliLiter(s) IV Push every 1 hour PRN Pre/post blood products, medications, blood draw, and to maintain line patency  tiZANidine 2 milliGRAM(s) Oral every 8 hours PRN Muscle Cramps      I&O's Summary    28 Jan 2023 07:01  -  29 Jan 2023 07:00  --------------------------------------------------------  IN: 400 mL / OUT: 1250 mL / NET: -850 mL        PHYSICAL EXAM:  Vital Signs Last 24 Hrs  T(C): 36.9 (29 Jan 2023 10:12), Max: 37 (28 Jan 2023 19:45)  T(F): 98.5 (29 Jan 2023 10:12), Max: 98.6 (28 Jan 2023 19:45)  HR: 95 (29 Jan 2023 10:12) (73 - 97)  BP: 155/69 (29 Jan 2023 10:12) (132/51 - 160/68)  BP(mean): --  RR: 18 (29 Jan 2023 10:12) (18 - 19)  SpO2: --    Parameters below as of 29 Jan 2023 10:12  Patient On (Oxygen Delivery Method): nasal cannula      CONSTITUTIONAL: NAD, cachetic   EYES: conjunctiva and sclera clear  ENMT: Moist oral mucosa   NECK: Supple   RESPIRATORY: Normal respiratory effort; lungs are clear to auscultation bilaterally  CARDIOVASCULAR: Regular rate and rhythm, normal S1 and S2, no murmur/rub/gallop; Peripheral pulses are 2+ bilaterally  ABDOMEN: Nontender to palpation, normoactive bowel sounds, no rebound/guarding   MUSCULOSKELETAL: No lower extremity edema   PSYCH: A+O to person, place, and time; affect appropriate  NEUROLOGY: no gross sensory or motor deficits   SKIN: No rashes    LABS:                        7.2    8.96  )-----------( 220      ( 29 Jan 2023 05:42 )             23.6     01-29    140  |  108<H>  |  78<H>  ----------------------------<  98  2.9<LL>   |  19<L>  |  4.62<H>    Ca    7.7<L>      29 Jan 2023 06:50  Phos  2.9     01-29  Mg     1.40     01-29      PT/INR - ( 28 Jan 2023 17:41 )   PT: 12.4 sec;   INR: 1.07 ratio         PTT - ( 29 Jan 2023 05:42 )  PTT:33.3 sec          SARS-CoV-2: NotDetec (26 Jan 2023 18:21)  COVID-19 PCR: NotDetec (24 Jan 2023 13:56)  COVID-19 PCR: NotDetec (19 Jan 2023 16:45)  COVID-19 PCR: NotDetec (16 Jan 2023 20:50)  COVID-19 PCR: NotDetec (09 Jan 2023 04:14)  COVID-19 PCR: Detected (08 Jan 2023 18:30)  COVID-19 PCR: Detected (06 Jan 2023 10:58)  COVID-19 PCR: NotDetec (03 Jan 2023 19:30)  COVID-19 PCR: Detected (29 Dec 2022 17:57)  SARS-CoV-2: NotDetec (25 Nov 2022 01:46)      RADIOLOGY & ADDITIONAL TESTS:  Other Results Reviewed Today: BMP with Cr c/w ESRD, CBC with hg slowly downtrending     COORDINATION OF CARE:  Communication: discussed plan of care with ACP

## 2023-01-29 NOTE — CHART NOTE - NSCHARTNOTEFT_GEN_A_CORE
Vital Signs Last 24 Hrs  T(C): 36.9 (29 Jan 2023 10:12), Max: 37 (28 Jan 2023 19:45)  T(F): 98.5 (29 Jan 2023 10:12), Max: 98.6 (28 Jan 2023 19:45)  HR: 95 (29 Jan 2023 10:12) (73 - 97)  BP: 155/69 (29 Jan 2023 10:12) (132/51 - 160/68)  BP(mean): --  RR: 18 (29 Jan 2023 10:12) (18 - 19)  SpO2: --    Parameters below as of 29 Jan 2023 10:12  Patient On (Oxygen Delivery Method): nasal cannula                            7.2    8.96  )-----------( 220      ( 29 Jan 2023 05:42 )             23.6   01-29    140  |  108<H>  |  78<H>  ----------------------------<  98  2.9<LL>   |  19<L>  |  4.62<H>    Ca    7.7<L>      29 Jan 2023 06:50  Phos  2.9     01-29  Mg     1.40     01-29    K 2.9, as discussed with nephro dr. chanel> PO 40meq potassium and Magnesium repletion ordered, d/paulina sodium bicarb tablets, will followup repeat BMP. Vital Signs Last 24 Hrs  T(C): 36.9 (29 Jan 2023 10:12), Max: 37 (28 Jan 2023 19:45)  T(F): 98.5 (29 Jan 2023 10:12), Max: 98.6 (28 Jan 2023 19:45)  HR: 95 (29 Jan 2023 10:12) (73 - 97)  BP: 155/69 (29 Jan 2023 10:12) (132/51 - 160/68)  BP(mean): --  RR: 18 (29 Jan 2023 10:12) (18 - 19)  SpO2: --    Parameters below as of 29 Jan 2023 10:12  Patient On (Oxygen Delivery Method): nasal cannula                            7.2    8.96  )-----------( 220      ( 29 Jan 2023 05:42 )             23.6   01-29    140  |  108<H>  |  78<H>  ----------------------------<  98  2.9<LL>   |  19<L>  |  4.62<H>    Ca    7.7<L>      29 Jan 2023 06:50  Phos  2.9     01-29  Mg     1.40     01-29    K 2.9, as discussed with nephro dr. chanel> PO 40meq potassium and Magnesium repletion ordered, d/paulina sodium bicarb tablets/renvela, will followup repeat BMP.    PT/INR - ( 28 Jan 2023 17:41 )   PT: 12.4 sec;   INR: 1.07 ratio    PTT - ( 29 Jan 2023 13:45 )  PTT:35.4 sec    01-29    139  |  107  |  76<H>  ----------------------------<  107<H>  3.5   |  19<L>  |  4.82<H>    Ca    7.7<L>      29 Jan 2023 13:45  Phos  2.9     01-29  Mg     1.70     01-29    Repeat BMP and aPTT results discussed with Dr. Marcum, increased heparin gtt to 11ml/hr, will repeat aPTT for continuous monitoring

## 2023-01-29 NOTE — PROGRESS NOTE ADULT - PROBLEM SELECTOR PLAN 1
Pt with YUNIEL on CKD likely multifactorial in the setting of recent COVID infection, poor oral intake. Scr was elevated at 2.20 in September 2022. Pt was recently admitted at [facility]. Scr on admission was significantly elevated at 8.94 on 11/25/22. Pt received 1st HD session on 11/26/22. Last HD session was done on 12/1/22. HD catheter was removed as kidney function was recovering. Scr was elevated at 5.28 on transfer from [facility] on 12/15/22. Scr remained elevated/stable at 5.40 on 1/5/23. Of note kidney sonogram had discrepant size renal artery duplex showing HESHAM but BP well controlled. Pt underwent LUE AVF creation on 1/5/23. Pt with worsening renal function and uremic symptoms now. Pt underwent HD catheter insertion on 1/27/23. Pt initiated on long term HD on 1/28. Pt 1st HD treatement on 1/28 and tolerated well. Plan for 2nd HD treatment on 1/30/23. Monitor labs and urine output. Avoid any potential nephrotoxins. Dose medications as per eGFR.

## 2023-01-29 NOTE — PROGRESS NOTE ADULT - PROBLEM SELECTOR PLAN 1
b/l heel wounds and right 2nd and 3rd toe wound.   Podiatry concern for deep infection and possible need for amputation.   - wound was seen with wound care at bedside, discussed care and per their evaluation, this is slight improved from last evaluation 1 week prior.   - BARRY/PVR result reviewed; mild stenosis in LLE, moderate stenosis in RLE.   - Xray foot negative for signs of OM  - C/w local wound care by Podiatry  - S/p RLE angio by vascular on 1/20 with PT plasty, possible need for pop-PT bypass - plan for next week   - B/L LE vein mapping-No evidence of thrombosis or significant venous wall thickening noted within the right and left great saphenous and short saphenous veins  - Appreciate cards clearance on 1/26  - F/up further Vascular recs - planning for OR tentatively on scheduled on Friday for pop-PT bypass

## 2023-01-30 NOTE — PROGRESS NOTE ADULT - PROBLEM SELECTOR PLAN 1
Pt with YUNIEL on CKD likely multifactorial in the setting of recent COVID infection, poor oral intake. Pt was initiated on HD on 1/28/23 due to worsening kidney function with uremia. Documented urine output is 300 cc over the past 24 hours. Pt underwent LUE AVF creation on 1/5/23. Vascular surgery note from 1/29/23 reviewed. Pt seen and evaluated in HD unit receiving HD treatment, tolerating well. Pt clinically stable. Plan for next HD treatment tomorrow (1/31/23). Monitor for renal recovery. Monitor labs and urine output. Avoid any potential nephrotoxins. Dose medications as per eGFR. Pt with YUNIEL on advanced CKD in the setting of recent COVID-19 and decreased oral intake. Pt was initiated on HD on 1/28/23 due to worsening kidney function with uremia. Documented urine output is 300 cc over the past 24 hours. Pt underwent LUE AVF creation on 1/5/23. Vascular surgery note from 1/29/23 reviewed. Pt. seen and evaluated in HD unit today. Pt. tolerating 2nd HD treatment. BP stable during HD rounds. HD catheter functioning well. Plan for third HD treatment tomorrow (1/31/23). Monitor labs and urine output. Avoid any potential nephrotoxins. Dose medications as per eGFR.

## 2023-01-30 NOTE — PROGRESS NOTE ADULT - ATTENDING COMMENTS
Pt. with YUNIEL on CKD, initiated on HD on 1/28/23. Assessment and plan for ESRD/HD and hyperphosphatemia as outlined above. Pt. seen and examined during HD today. /68 at time of HD rounds. HD catheter functioning well. Plan for HD treatment tomorrow. Monitor BP and labs

## 2023-01-30 NOTE — PROGRESS NOTE ADULT - PROBLEM SELECTOR PLAN 3
-c/w hep ggt   - plan to transition to DOAC on DC after completes all procedures/surgeries  - trend cbcs

## 2023-01-30 NOTE — CHART NOTE - NSCHARTNOTEFT_GEN_A_CORE
I spoke to infection control [facility] #[id number]. Advised to use middle port of the triple lumen HD catheter for iv medication as pt has no other IV access.

## 2023-01-30 NOTE — PROGRESS NOTE ADULT - PROBLEM SELECTOR PLAN 2
Pt. with anemia in setting of renal failure. Hemoglobin (7.7) below target range. Plan to continue with EMMANUEL with HD. Blood transfusion per primary team. Monitor Hb. Hemoglobin low at 7.7 today. Pt. on EMMANUEL with HD. Blood transfusion per primary team. Monitor Hb.    If you have any questions, please feel free to contact me  Treva Mohr  Nephrology Fellow  [phone] / [employer] Teams(Preferred)  (After 5pm or on weekends please page the on-call fellow)

## 2023-01-30 NOTE — PROGRESS NOTE ADULT - ASSESSMENT
62M with bilateral foot wounds.   - Patient seen and evaluated.  - Afebrile, WBC 8.97  - Right IPJ hallux wound to subQ, fibrotic wound bed, right foot 2nd and 3rd digit medial DIPJ wound to bone with increased granulation, exposed distal phalanx to both digits, no malodor, necrotic periwound edges. Left foot heel  and 5th met base early DTI with no signs of infection.   - Bilateral foot xray: Bilateral soft tissue swelling. No tracking soft tissue gas collections, radiopaque foreign bodies, or gross radiographic evidence for osteomyelitis.  - No wound culture necessary 2/2 to no acute signs of infection.   - BARRY/PVRs: RABI 0.66, RTBI 0.3, L BARRY 0.76, L TBI 0.63 small arterial disease b/l   - Discussed the importance of offloading bilateral feet with zflows to avoid developing wounds however patient refused again to wear them on (1/30/23)  - s/p RLE angiogram with PT plasty, planning Pop-PT bypass; appreciated.  - Vasc planning RLE repeat angio tentatively on Friday 2/3  - Pod plan local wound care pending vascular recs.   - Discussed with attending.   PAST SURGICAL HISTORY:  No significant past surgical history

## 2023-01-30 NOTE — PROGRESS NOTE ADULT - SUBJECTIVE AND OBJECTIVE BOX
Patient is a 62y old  Male who presents with a chief complaint of intractable N/V , esophageal stent removal (29 Jan 2023 13:55)      HPI:  62 year old male with Hx of esophageal stricture (s/p stent on 5/2022 by Dr. Hernandez?), Emphysema, HCV, chronic pancreatitis, presenting from [facility] for esophageal stent removal due to intractable n/v. Pt was admitted to [facility] on 11/25 for AMS 2/2 Septic shock due to psuedomonal PNA, Lung abscess, morganella bacteremia and kelbsiella UTI. Pt was initially intubated for acute respiratory failure and required pressors for hypotension . Pt's hospital course was complicated by YUNIEL due to septic ATN requiring 3 rounds of HD. Pt is on now extubated, on 4L of NC, off pressors. Pt developed intractable N/V during his hospital stay and was transferred to [facility] for esophageal stent removal by Dr. Erickson.  Pt was also found to be COVID + on 12/13/22.   Pt alert, awake, AOx3 when seen. Pt states he feels cold and is aggravated. Pt demanding his pain medications for esophageal and body aches.   Denies chest pain, shortness of breath.      (15 Dec 2022 09:21)      PAST MEDICAL & SURGICAL HISTORY:  ETOH abuse  sober x1 year approximately      Pancreatitis      Hepatitis C  treated      Gout      HTN (hypertension)      Chronic kidney disease (CKD)      H/O chronic pancreatitis      Gout      Alcohol abuse      Wound of right foot      Gastric perforation          MEDICATIONS  (STANDING):  albuterol/ipratropium for Nebulization. 3 milliLiter(s) Nebulizer once  cadexomer iodine 0.9% Gel 1 Application(s) Topical daily  calcitriol   Capsule 0.5 MICROGram(s) Oral daily  chlorhexidine 2% Cloths 1 Application(s) Topical two times a day  epoetin mejia-epbx (RETACRIT) Injectable 71695 Unit(s) SubCutaneous <User Schedule>  folic acid 1 milliGRAM(s) Oral daily  guaiFENesin  milliGRAM(s) Oral every 12 hours  heparin  Infusion 1200 Unit(s)/Hr (12 mL/Hr) IV Continuous <Continuous>  magnesium sulfate  IVPB 2 Gram(s) IV Intermittent once  metoclopramide 5 milliGRAM(s) Oral three times a day  multivitamin 1 Tablet(s) Oral daily  NIFEdipine XL 30 milliGRAM(s) Oral daily  pancrelipase  (CREON  6,000 Lipase Units) 1 Capsule(s) Oral three times a day with meals  pantoprazole    Tablet 40 milliGRAM(s) Oral every 12 hours  polyethylene glycol 3350 17 Gram(s) Oral two times a day  potassium chloride   Powder 20 milliEquivalent(s) Oral once  senna 2 Tablet(s) Oral at bedtime  sucralfate 1 Gram(s) Oral every 6 hours  thiamine 100 milliGRAM(s) Oral daily    MEDICATIONS  (PRN):  bisacodyl 5 milliGRAM(s) Oral every 12 hours PRN Constipation  ondansetron Injectable 4 milliGRAM(s) IV Push every 6 hours PRN Nausea and/or Vomiting  oxyCODONE    IR 10 milliGRAM(s) Oral every 4 hours PRN Moderate Pain (4 - 6)-Severe pain (7-10)  sodium chloride 0.9% lock flush 10 milliLiter(s) IV Push every 1 hour PRN Pre/post blood products, medications, blood draw, and to maintain line patency  tiZANidine 2 milliGRAM(s) Oral every 8 hours PRN Muscle Cramps      Allergies    Tylenol (Other)    Intolerances        VITALS:    Vital Signs Last 24 Hrs  T(C): 37.1 (30 Jan 2023 05:40), Max: 37.1 (30 Jan 2023 05:40)  T(F): 98.7 (30 Jan 2023 05:40), Max: 98.7 (30 Jan 2023 05:40)  HR: 93 (30 Jan 2023 05:40) (85 - 95)  BP: 138/69 (30 Jan 2023 05:40) (100/68 - 155/69)  BP(mean): --  RR: 17 (30 Jan 2023 05:40) (17 - 18)  SpO2: 100% (30 Jan 2023 05:40) (100% - 100%)    Parameters below as of 30 Jan 2023 05:40  Patient On (Oxygen Delivery Method): nasal cannula  O2 Flow (L/min): 2      LABS:                          7.7    8.97  )-----------( 232      ( 30 Jan 2023 06:00 )             25.3       01-30    139  |  109<H>  |  78<H>  ----------------------------<  99  3.3<L>   |  19<L>  |  4.74<H>    Ca    7.9<L>      30 Jan 2023 06:00  Phos  2.4     01-30  Mg     1.50     01-30        CAPILLARY BLOOD GLUCOSE          PT/INR - ( 30 Jan 2023 06:00 )   PT: 11.8 sec;   INR: 1.02 ratio         PTT - ( 30 Jan 2023 06:00 )  PTT:62.7 sec    LOWER EXTREMITY PHYSICAL EXAM:  Vascular: DP/PT 0/4, B/L, CFT >3 seconds B/L, Temperature gradient warm to cool, B/L.   Neuro: Epicritic sensation intact to the level of digits, B/L.  Musculoskeletal/Ortho: unremarkable   Skin: Right IPJ hallux wound to subQ, fibrotic wound bed, right foot 2nd and 3rd digit medial DIPJ wound to bone with increased granulation, exposed distal phalanx to both digits, no malodor, necrotic periwound edges. Left foot heel  and 5th met base early DTI with no signs of infection.   RADIOLOGY & ADDITIONAL STUDIES:

## 2023-01-30 NOTE — PROGRESS NOTE ADULT - SUBJECTIVE AND OBJECTIVE BOX
[facility] Division of Hospital Medicine  Parish Nichols MD  Pager [phone]      Patient is a 62y old  Male who presents with a chief complaint of intractable N/V , esophageal stent removal (30 Jan 2023 10:06)      SUBJECTIVE / OVERNIGHT EVENTS:    pt cont to report L foot and R arm pain.     ADDITIONAL REVIEW OF SYSTEMS:    RESPIRATORY: No cough, wheezing, chills or hemoptysis; No shortness of breath  CARDIOVASCULAR: No chest pain, palpitations, dizziness, or leg swelling  GASTROINTESTINAL: No abdominal or epigastric pain. No nausea, vomiting, or hematemesis; No diarrhea or constipation. No melena or hematochezia.      MEDICATIONS  (STANDING):  albuterol/ipratropium for Nebulization. 3 milliLiter(s) Nebulizer once  cadexomer iodine 0.9% Gel 1 Application(s) Topical daily  calcitriol   Capsule 0.5 MICROGram(s) Oral daily  chlorhexidine 2% Cloths 1 Application(s) Topical two times a day  epoetin mejia-epbx (RETACRIT) Injectable 59663 Unit(s) SubCutaneous <User Schedule>  folic acid 1 milliGRAM(s) Oral daily  guaiFENesin  milliGRAM(s) Oral every 12 hours  heparin  Infusion 1300 Unit(s)/Hr (13 mL/Hr) IV Continuous <Continuous>  magnesium sulfate  IVPB 2 Gram(s) IV Intermittent once  metoclopramide 5 milliGRAM(s) Oral three times a day  multivitamin 1 Tablet(s) Oral daily  NIFEdipine XL 30 milliGRAM(s) Oral daily  pancrelipase  (CREON  6,000 Lipase Units) 1 Capsule(s) Oral three times a day with meals  pantoprazole    Tablet 40 milliGRAM(s) Oral every 12 hours  polyethylene glycol 3350 17 Gram(s) Oral two times a day  senna 2 Tablet(s) Oral at bedtime  sucralfate 1 Gram(s) Oral every 6 hours  thiamine 100 milliGRAM(s) Oral daily    MEDICATIONS  (PRN):  bisacodyl 5 milliGRAM(s) Oral every 12 hours PRN Constipation  ondansetron Injectable 4 milliGRAM(s) IV Push every 6 hours PRN Nausea and/or Vomiting  oxyCODONE    IR 10 milliGRAM(s) Oral every 4 hours PRN Moderate Pain (4 - 6)-Severe pain (7-10)  sodium chloride 0.9% lock flush 10 milliLiter(s) IV Push every 1 hour PRN Pre/post blood products, medications, blood draw, and to maintain line patency  tiZANidine 2 milliGRAM(s) Oral every 8 hours PRN Muscle Cramps      CAPILLARY BLOOD GLUCOSE      POCT Blood Glucose.: 126 mg/dL (30 Jan 2023 13:42)    I&O's Summary    29 Jan 2023 07:01  -  30 Jan 2023 07:00  --------------------------------------------------------  IN: 284 mL / OUT: 300 mL / NET: -16 mL    30 Jan 2023 07:01  -  30 Jan 2023 14:38  --------------------------------------------------------  IN: 893 mL / OUT: 1600 mL / NET: -707 mL        PHYSICAL EXAM:  Vital Signs Last 24 Hrs  T(C): 36.6 (30 Jan 2023 13:01), Max: 37.1 (30 Jan 2023 05:40)  T(F): 97.9 (30 Jan 2023 13:01), Max: 98.7 (30 Jan 2023 05:40)  HR: 81 (30 Jan 2023 13:01) (81 - 98)  BP: 123/63 (30 Jan 2023 13:01) (100/68 - 145/-)  BP(mean): --  RR: 18 (30 Jan 2023 13:01) (17 - 18)  SpO2: 100% (30 Jan 2023 13:01) (98% - 100%)    Parameters below as of 30 Jan 2023 13:01  Patient On (Oxygen Delivery Method): nasal cannula  O2 Flow (L/min): 2      CONSTITUTIONAL: NAD,  EYES: PERRLA; conjunctiva and sclera clear  ENMT: Moist oral mucosa, no pharyngeal injection or exudates;   NECK: Supple, no palpable masses;  RESPIRATORY: Normal respiratory effort; basilar crackles bilaterally. [facility] triple lumen HD cath in place  CARDIOVASCULAR: Regular rate and rhythm, normal S1 and S2, no murmur/rub/gallop; No lower extremity edema; unable to palpate DT pulse b/l   ABDOMEN: Nontender to palpation, normoactive bowel sounds, no rebound/guarding;   MUSCLOSKELETAL:   no clubbing or cyanosis of digits; no joint swelling or tenderness to palpation  PSYCH: A+O to person, place  NEUROLOGY: CN 2-12 are intact and symmetric; no gross sensory deficits;   SKIN: L heel wound dressing c/d/i   LABS:                        7.7    8.97  )-----------( 232      ( 30 Jan 2023 06:00 )             25.3     01-30    139  |  109<H>  |  78<H>  ----------------------------<  99  3.3<L>   |  19<L>  |  4.74<H>    Ca    7.9<L>      30 Jan 2023 06:00  Phos  2.4     01-30  Mg     1.50     01-30      PT/INR - ( 30 Jan 2023 06:00 )   PT: 11.8 sec;   INR: 1.02 ratio         PTT - ( 30 Jan 2023 12:47 )  PTT:29.2 sec            RADIOLOGY & ADDITIONAL TESTS:  Results Reviewed:   Imaging Personally Reviewed:  Electrocardiogram Personally Reviewed:    COORDINATION OF CARE:  Care Discussed with Consultants/Other Providers [Y/N]:  Prior or Outpatient Records Reviewed [Y/N]:

## 2023-01-30 NOTE — PROGRESS NOTE ADULT - PROBLEM SELECTOR PLAN 1
b/l heel wounds and right 2nd and 3rd toe wound.   - Xray foot negative for signs of OM  -podiatry following- concern for poor wound healing in the setting PAD- rec local wound care until revascularization   - BARRY/PVR result reviewed; mild stenosis in LLE, moderate stenosis in RLE.   - S/p RLE angio by vascular on 1/20 with PT plasty, possible need for pop-PT bypass - tentatively scheduled for Fri   - B/L LE vein mapping-No evidence of thrombosis or significant venous wall thickening noted within the right and left great saphenous and short saphenous veins  - Appreciate cards clearance on 1/26

## 2023-01-30 NOTE — CHART NOTE - NSCHARTNOTEFT_GEN_A_CORE
Vital Signs Last 24 Hrs  T(C): 36.9 (30 Jan 2023 10:30), Max: 37.1 (30 Jan 2023 05:40)  T(F): 98.4 (30 Jan 2023 10:30), Max: 98.7 (30 Jan 2023 05:40)  HR: 98 (30 Jan 2023 10:30) (85 - 98)  BP: 145/- (30 Jan 2023 10:30) (100/68 - 145/-)  BP(mean): --  RR: 18 (30 Jan 2023 10:30) (17 - 18)  SpO2: 98% (30 Jan 2023 10:30) (98% - 100%)    Parameters below as of 30 Jan 2023 10:30  Patient On (Oxygen Delivery Method): nasal cannula  O2 Flow (L/min): 2                          7.7    8.97  )-----------( 232      ( 30 Jan 2023 06:00 )             25.3   01-30    139  |  109<H>  |  78<H>  ----------------------------<  99  3.3<L>   |  19<L>  |  4.74<H>    Ca    7.9<L>      30 Jan 2023 06:00  Phos  2.4     01-30  Mg     1.50     01-30    PT/INR - ( 30 Jan 2023 06:00 )   PT: 11.8 sec;   INR: 1.02 ratio    PTT - ( 30 Jan 2023 12:47 )  PTT:29.2 sec    Results and case discussed with Dr. Nichols, aPTT 29> increased to 13ml, will monitor next aPTT.  Received call from HD nurse that per hospital policy cannot utilize RIJ lumen middle port for heparin gtt. As per discussion with Dr. Nichols and IR attending Dr. Denney > ok to use middle small port for heparin gtt, and 2larger ports for HD access, discussed with dialysis nurse and ANM. However given hospital policy, Dr. Nichols discussed case further with infection control (see attending note from today).

## 2023-01-30 NOTE — PROGRESS NOTE ADULT - SUBJECTIVE AND OBJECTIVE BOX
[facility] Division of Kidney Diseases & Hypertension  FOLLOW UP NOTE  [phone]--------------------------------------------------------------------------------    HPI: Pt is a 62-year-old Male with Hx of esophageal stricture (S/p stenting in 5/22), Emphysema, HCV, chronic pancreatitis who initially presented to [facility] on 11/25/22 for AMS. Pt was admitted to [facility] on 11/25 for AMS 2/2 Septic shock. Pt was initially intubated for acute respiratory failure and required pressors for hypotension. Pt's hospital course was complicated by YUNIEL/ATN requiring HD (11/26/22-12/1/22). Pt was extubated and was transferred to [facility] for esophageal stent removal by Dr. Erickson. Initial labs on this admission concerning for elevated Scr. Pt being seen for YUNIEL on CKD. Pt was initiated on HD on 1/28/23.      24 hour events/subjective: Pt was seen and evaluated in the HD unit receiving HD treatment. Pt reports improvement in dyspnea. Denies any headaches, nausea, vomiting, fevers/chills, chest pain, and abdominal pain      PAST HISTORY  --------------------------------------------------------------------------------  No significant changes to PMH, PSH, FHx, SHx, unless otherwise noted    ALLERGIES & MEDICATIONS  --------------------------------------------------------------------------------  Allergies    Tylenol (Other)    Intolerances      Standing Inpatient Medications  albuterol/ipratropium for Nebulization. 3 milliLiter(s) Nebulizer once  cadexomer iodine 0.9% Gel 1 Application(s) Topical daily  calcitriol   Capsule 0.5 MICROGram(s) Oral daily  chlorhexidine 2% Cloths 1 Application(s) Topical two times a day  epoetin mejia-epbx (RETACRIT) Injectable 49898 Unit(s) SubCutaneous <User Schedule>  folic acid 1 milliGRAM(s) Oral daily  guaiFENesin  milliGRAM(s) Oral every 12 hours  heparin  Infusion 1300 Unit(s)/Hr IV Continuous <Continuous>  magnesium sulfate  IVPB 2 Gram(s) IV Intermittent once  metoclopramide 5 milliGRAM(s) Oral three times a day  multivitamin 1 Tablet(s) Oral daily  NIFEdipine XL 30 milliGRAM(s) Oral daily  pancrelipase  (CREON  6,000 Lipase Units) 1 Capsule(s) Oral three times a day with meals  pantoprazole    Tablet 40 milliGRAM(s) Oral every 12 hours  polyethylene glycol 3350 17 Gram(s) Oral two times a day  senna 2 Tablet(s) Oral at bedtime  sucralfate 1 Gram(s) Oral every 6 hours  thiamine 100 milliGRAM(s) Oral daily    PRN Inpatient Medications  bisacodyl 5 milliGRAM(s) Oral every 12 hours PRN  ondansetron Injectable 4 milliGRAM(s) IV Push every 6 hours PRN  oxyCODONE    IR 10 milliGRAM(s) Oral every 4 hours PRN  sodium chloride 0.9% lock flush 10 milliLiter(s) IV Push every 1 hour PRN  tiZANidine 2 milliGRAM(s) Oral every 8 hours PRN      REVIEW OF SYSTEMS  --------------------------------------------------------------------------------  Gen: No fevers/chills  Skin: No rashes  Head/Eyes/Ears/Mouth: No headache  Respiratory: See HPI  CV: No chest pain  GI: No abdominal pain  MSK: No joint pain/swelling  Neuro: No dizziness/lightheadedness    All other systems were reviewed and are negative, except as noted.    VITALS/PHYSICAL EXAM  --------------------------------------------------------------------------------  T(C): 36.6 (01-30-23 @ 13:01), Max: 37.1 (01-30-23 @ 05:40)  HR: 81 (01-30-23 @ 13:01) (81 - 98)  BP: 123/63 (01-30-23 @ 13:01) (100/68 - 145/-)  RR: 18 (01-30-23 @ 13:01) (17 - 18)  SpO2: 100% (01-30-23 @ 13:01) (98% - 100%)  Wt(kg): --      01-29-23 @ 07:01  -  01-30-23 @ 07:00  --------------------------------------------------------  IN: 284 mL / OUT: 300 mL / NET: -16 mL    01-30-23 @ 07:01  -  01-30-23 @ 14:30  --------------------------------------------------------  IN: 893 mL / OUT: 1600 mL / NET: -707 mL      Physical Exam:  Gen NAD  HEENT: no JVD  Pulm: CTABL  CV: S1S2,  Abd: Soft,   Ext: no edema B/L LE, +B/L foot wounds  Neuro: Awake and alert  Skin: Warm and dry  Dialysis access:  left IJ non tunneled HD catheter with dressing seen, LUE with +thrill and bruit    LABS/STUDIES  --------------------------------------------------------------------------------              7.7    8.97  >-----------<  232      [01-30-23 @ 06:00]              25.3     139  |  109  |  78  ----------------------------<  99      [01-30-23 @ 06:00]  3.3   |  19  |  4.74        Ca     7.9     [01-30-23 @ 06:00]      iCa    1.15     [01-30 @ 06:00]      Mg     1.50     [01-30-23 @ 06:00]      Phos  2.4     [01-30-23 @ 06:00]      PT/INR: PT 11.8 , INR 1.02       [01-30-23 @ 06:00]  PTT: 29.2       [01-30-23 @ 12:47]    Creatinine Trend:  SCr 4.74 [01-30 @ 06:00]  SCr 4.82 [01-29 @ 13:45]  SCr 4.62 [01-29 @ 06:50]  SCr 4.65 [01-29 @ 05:42]  SCr 6.72 [01-28 @ 06:12]    HBsAb 10.9      [01-28-23 @ 17:41]  HBsAg Nonreact      [01-28-23 @ 17:41]  HBcAb Reactive      [01-28-23 @ 17:41]  HCV 8.31, Reactive      [01-28-23 @ 17:41] [facility] Division of Kidney Diseases & Hypertension  FOLLOW UP NOTE  [phone]--------------------------------------------------------------------------------    HPI: 62-year-old male with history of esophageal stricture (s/p stenting in 5/22), Emphysema, HCV, chronic pancreatitis who initially presented to [facility] on 11/25/22 for AMS. Pt. was admitted to [facility] on 11/25 for AMS 2/2 Septic shock. Pt was initially intubated for acute respiratory failure and required pressors for hypotension. Pt's hospital course was complicated by YUNIEL/ATN requiring HD (11/26/22-12/1/22). Pt was extubated and was transferred to [facility] for esophageal stent removal by Dr. Erickson. Initial labs on this admission concerning for elevated Scr. Pt being seen for YUNIEL on advanced CKD. Pt. was initiated on HD on 1/28/23.    24 hour events/subjective: Pt. seen and examined during HD today. Pt. reports improvement in dyspnea. Pt. gives history of body pain and weakness. No fever during HD rounds.    PAST HISTORY  --------------------------------------------------------------------------------  No significant changes to PMH, PSH, FHx, SHx, unless otherwise noted    ALLERGIES & MEDICATIONS  --------------------------------------------------------------------------------  Allergies    Tylenol (Other)    Intolerances    Standing Inpatient Medications  albuterol/ipratropium for Nebulization. 3 milliLiter(s) Nebulizer once  cadexomer iodine 0.9% Gel 1 Application(s) Topical daily  calcitriol   Capsule 0.5 MICROGram(s) Oral daily  chlorhexidine 2% Cloths 1 Application(s) Topical two times a day  epoetin mejia-epbx (RETACRIT) Injectable 10936 Unit(s) SubCutaneous <User Schedule>  folic acid 1 milliGRAM(s) Oral daily  guaiFENesin  milliGRAM(s) Oral every 12 hours  heparin  Infusion 1300 Unit(s)/Hr IV Continuous <Continuous>  magnesium sulfate  IVPB 2 Gram(s) IV Intermittent once  metoclopramide 5 milliGRAM(s) Oral three times a day  multivitamin 1 Tablet(s) Oral daily  NIFEdipine XL 30 milliGRAM(s) Oral daily  pancrelipase  (CREON  6,000 Lipase Units) 1 Capsule(s) Oral three times a day with meals  pantoprazole    Tablet 40 milliGRAM(s) Oral every 12 hours  polyethylene glycol 3350 17 Gram(s) Oral two times a day  senna 2 Tablet(s) Oral at bedtime  sucralfate 1 Gram(s) Oral every 6 hours  thiamine 100 milliGRAM(s) Oral daily    PRN Inpatient Medications  bisacodyl 5 milliGRAM(s) Oral every 12 hours PRN  ondansetron Injectable 4 milliGRAM(s) IV Push every 6 hours PRN  oxyCODONE    IR 10 milliGRAM(s) Oral every 4 hours PRN  sodium chloride 0.9% lock flush 10 milliLiter(s) IV Push every 1 hour PRN  tiZANidine 2 milliGRAM(s) Oral every 8 hours PRN    REVIEW OF SYSTEMS  --------------------------------------------------------------------------------  Gen: See HPI, no fever  Skin: No rash  Head/Eyes/Ears/Mouth: No headache  Respiratory: See HPI  CV: No chest pain  GI: No abdominal pain  MSK: No LE edema   Neuro: No dizziness    All other systems were reviewed and are negative, except as noted.    VITALS/PHYSICAL EXAM  --------------------------------------------------------------------------------  T(C): 36.6 (01-30-23 @ 13:01), Max: 37.1 (01-30-23 @ 05:40)  HR: 81 (01-30-23 @ 13:01) (81 - 98)  BP: 123/63 (01-30-23 @ 13:01) (100/68 - 145/-)  RR: 18 (01-30-23 @ 13:01) (17 - 18)  SpO2: 100% (01-30-23 @ 13:01) (98% - 100%)  Wt(kg): --    01-29-23 @ 07:01  -  01-30-23 @ 07:00  --------------------------------------------------------  IN: 284 mL / OUT: 300 mL / NET: -16 mL    01-30-23 @ 07:01  -  01-30-23 @ 14:30  --------------------------------------------------------  IN: 893 mL / OUT: 1600 mL / NET: -707 mL    Physical Exam:    Gen resting  HEENT: no JVD  Pulm: CTA B/L  CV: S1S2+  Abd: Soft  Ext: Left foot dressing seen  Neuro: Awake and alert  Skin: Warm and dry  Dialysis access: Left IJ non tunneled HD catheter being used for HD, DARYL WILLIS+    LABS/STUDIES  --------------------------------------------------------------------------------              7.7    8.97  >-----------<  232      [01-30-23 @ 06:00]              25.3     139  |  109  |  78  ----------------------------<  99      [01-30-23 @ 06:00]  3.3   |  19  |  4.74        Ca     7.9     [01-30-23 @ 06:00]      iCa    1.15     [01-30 @ 06:00]      Mg     1.50     [01-30-23 @ 06:00]      Phos  2.4     [01-30-23 @ 06:00]    Creatinine Trend:  SCr 4.74 [01-30 @ 06:00]  SCr 4.82 [01-29 @ 13:45]  SCr 4.62 [01-29 @ 06:50]  SCr 4.65 [01-29 @ 05:42]  SCr 6.72 [01-28 @ 06:12]    HBsAb 10.9      [01-28-23 @ 17:41]  HBsAg Nonreact      [01-28-23 @ 17:41]  HBcAb Reactive      [01-28-23 @ 17:41]  HCV 8.31, Reactive      [01-28-23 @ 17:41]

## 2023-01-30 NOTE — PROGRESS NOTE ADULT - ASSESSMENT
62M EtoH misuse c/b chronic pancreatitis, HCV s/p treatment (SVR), emphysema, benign esophageal stricture (s/p stent on 4/2022) admitted at [facility] w/ septic shock septic shock due to pseudomonal PNA, lung abscess, Morganella bacteremia and Klebsiella UTI (now s/p 6 wks zosyn ended 1/6) w/ hospital course c/b YUNIEL on CKD requiring HD and MICU stay, transferred here for intractable nausea/vomiting and esophageal stent removal, now s/p removal w/ EGD sig for esophagitis/gastric ulcer. Hospital course now c/b acute on chronic anemia required multiple transfusion. Now patient is noted to have skin breakdown in Right toes concerning for underline infection secondary to vascular insufficiency. Plan for repeat angiogram, and potential repeat  pop-PT bypass surgery. Course c.b by Right UE DVT, limiting IV access.

## 2023-01-30 NOTE — PROGRESS NOTE ADULT - PROBLEM SELECTOR PLAN 2
YUNIEL on CKD now progressed to ESRD, started on HD   - [facility] renal duplex with severe stenosis of the left renal artery; vascular consulted, no plan for intervention for HESHAM  - non-tunnelled triple lumen HD cath placed on 1/27  - s/p AVF w/ vascular on 1/5 to the L arm, L arm precautions. Vascular following. Will need OP follow up   - mgmt of HAGMA bicarb per nephrology  - sevelamer 800 mg TID, bicarb 1300 mg TID; calcitriol increased to 0.5 for secondary hyperparathyroidism per renal  - c/w HD as per renal

## 2023-01-30 NOTE — PROGRESS NOTE ADULT - PROBLEM SELECTOR PLAN 9
Septic shock due to pseudomonal PNA with lung abscess, morganella bacteremia and kelbsiella UTI.  Was [facility] since 11[facility]25, [facility] 4 week course of antibiotics versus possibly longer pending imaging at 4 weeks (12/23)  - CXR 12/23 - Stable RUL lesion, with new RLL pneumonia   - seen by ID  - completed 6 wks zosyn 1/6  - per d/w ID no further imaging as 6 wks abx sufficient treatment

## 2023-01-30 NOTE — PROGRESS NOTE ADULT - PROBLEM SELECTOR PLAN 4
Pt with h/o hyperphosphatemia in the setting of ESRD. Pt now hypophosphatemic. Serum phosphorus is low at 2.4. Recommend to continue to hold phos binders. Monitor serum phosphorus level.    If you have any questions, please feel free to contact me  Treva Mohr  Nephrology Fellow  [phone] / [employer] Teams(Preferred)  (After 5pm or on weekends please page the on-call fellow)

## 2023-01-30 NOTE — PROGRESS NOTE ADULT - PROBLEM SELECTOR PLAN 3
Pt with hypokalemia. Serum potassium is low at 3.3 today. Pt received potassium chloride 20 meq PO once. Monitor serum potassium.

## 2023-01-30 NOTE — PROGRESS NOTE ADULT - ASSESSMENT
Pt with advanced CKD, initiated on HD on 1/28/23 Pt with YUNIEL on advanced CKD, initiated on HD on 1/28/23

## 2023-01-31 NOTE — PROVIDER CONTACT NOTE (CRITICAL VALUE NOTIFICATION) - SITUATION
hemoglobin was 6.7 hematocrit was 20.9
HGB 6.9
Calcium 6.85 Troponin 210
Patient's morning BMP showed Potassium level of 2.8. Informed about critical value via Serology
Serum blood Glucose 49
critical report: potassium 2.6

## 2023-01-31 NOTE — PROGRESS NOTE ADULT - SUBJECTIVE AND OBJECTIVE BOX
SURGERY  Pager: #[phone]    INTERVAL EVENTS/SUBJECTIVE: No acute events overnight.     ______________________________________________  OBJECTIVE:   T(C): 36.8 (01-31-23 @ 06:30), Max: 37.1 (01-30-23 @ 14:30)  HR: 92 (01-31-23 @ 06:30) (81 - 98)  BP: 126/60 (01-31-23 @ 06:30) (123/63 - 156/74)  RR: 17 (01-31-23 @ 06:30) (16 - 18)  SpO2: 100% (01-31-23 @ 05:11) (98% - 100%)  Wt(kg): --  CAPILLARY BLOOD GLUCOSE      POCT Blood Glucose.: 126 mg/dL (30 Jan 2023 13:42)    I&O's Detail    30 Jan 2023 07:01  -  31 Jan 2023 07:00  --------------------------------------------------------  IN:    Heparin: 13 mL    Heparin: 15 mL    Oral Fluid: 720 mL    Other (mL): 400 mL  Total IN: 1148 mL    OUT:    Other (mL): 1400 mL    Voided (mL): 400 mL  Total OUT: 1800 mL    Total NET: -652 mL          Physical Exam:  Gen: resting in bed comfortably in NAD  Chest: no increased WOB, regular inspiratory effort   Abdomen: Soft, nontender, nondistended with no rebound tenderness or guarding.   Vascular: L groin soft and flat, LE +DP/PT signals. LUE with palpable radial and ulnar pulses, sensation/motor intact. Palpable over AVF   Neuro: awake, alert  ______________________________________________  LABS:  CBC Full  -  ( 31 Jan 2023 07:00 )  WBC Count : 8.60 K/uL  RBC Count : 2.36 M/uL  Hemoglobin : 7.1 g/dL  Hematocrit : 22.4 %  Platelet Count - Automated : 221 K/uL  Mean Cell Volume : 94.9 fL  Mean Cell Hemoglobin : 30.1 pg  Mean Cell Hemoglobin Concentration : 31.7 gm/dL  Auto Neutrophil # : x  Auto Lymphocyte # : x  Auto Monocyte # : x  Auto Eosinophil # : x  Auto Basophil # : x  Auto Neutrophil % : x  Auto Lymphocyte % : x  Auto Monocyte % : x  Auto Eosinophil % : x  Auto Basophil % : x    01-31    136  |  103  |  46<H>  ----------------------------<  178<H>  2.8<LL>   |  23  |  3.22<H>    Ca    7.3<L>      31 Jan 2023 07:00  Phos  1.6     01-31  Mg     1.40     01-31      _____________________________________________  RADIOLOGY:

## 2023-01-31 NOTE — PROGRESS NOTE ADULT - ASSESSMENT
62M EtoH misuse c/b chronic pancreatitis, HCV s/p treatment (SVR), emphysema, benign esophageal stricture (s/p stent on 4/2022) admitted at [facility] w/ septic shock septic shock due to pseudomonal PNA, lung abscess, Morganella bacteremia and Klebsiella UTI (now s/p 6 wks zosyn ended 1/6) w/ hospital course c/b YUNIEL on CKD requiring HD and MICU stay, transferred here for intractable nausea/vomiting and esophageal stent removal, now s/p removal w/ EGD sig for esophagitis/gastric ulcer. Hospital course now c/b acute on chronic anemia required multiple transfusion. Now patient is noted to have skin breakdown in Right toes concerning for underline infection secondary to vascular insufficiency. Plan for repeat angiogram, and potential repeat  pop-PT bypass surgery. Course c.b by Right UE DVT, limiting IV access.

## 2023-01-31 NOTE — PROGRESS NOTE ADULT - PROBLEM SELECTOR PLAN 3
-c/w hep gtt  - plan to transition to DOAC on DC after completes all procedures/surgeries  - trend cbcs

## 2023-01-31 NOTE — PROVIDER CONTACT NOTE (CRITICAL VALUE NOTIFICATION) - ACTION/TREATMENT ORDERED:
provider notified will assess.
Provider will order PRBC and type and screen.
As per provider, repeat BMP is to be drawn at 1100. PO potassium and magnesium replacements are to be ordered.
provider notified
Continue to monitor.
provider aware

## 2023-01-31 NOTE — PROVIDER CONTACT NOTE (CRITICAL VALUE NOTIFICATION) - ASSESSMENT
Patient A&oX4, RA. Vital signs stable. No SOB, no signs of distress noted.
A&OX4. vitals stable. no s/s distress
Patient currently off unit, at dialysis
Pt was asymptomatic. Fingerstick checked, blood sugar 76. MD made aware.
hemoglobin was 6.7 hematocrit was 20.9, pt is asymptomatic

## 2023-01-31 NOTE — PROGRESS NOTE ADULT - ASSESSMENT
62M hx esophageal stricture, chronic pancreatitis, originally presented with n/v, esophageal stent removal in December, course c/b septic shock. Patient s/p first stage left brachiobasilic AVF on 1/5 with Dr. Oro. Vascular surgery reconsulted in setting of bilateral foot wounds and reported nonpalpable pulses. S/p RLE angio on 1/20 with PT plasty, now planning for possible pop-PT bypass    PLAN:  - Vein mapping completed, reviewed - will need repeat angiogram in OR with pedal access given small veins on duplex   - Fistula evaluated, no current intervention   - Pt. now s/p tunneled catheter placement with IR, getting HD - will plan for OR, tentatively on schedule Friday  - Please document optimization from medicine, cardiology, nephrology. Cardiology optimization noted from 1/24     C Team Surgery  #[id number]

## 2023-01-31 NOTE — PROVIDER CONTACT NOTE (CRITICAL VALUE NOTIFICATION) - RECOMMENDATIONS
provider notified
provider aware
As per provider, repeat BMP is to be drawn at 1100. PO potassium and magnesium replacements are to be ordered.
notify provider

## 2023-01-31 NOTE — PROVIDER CONTACT NOTE (CRITICAL VALUE NOTIFICATION) - BACKGROUND
Pt admitted for digestive system disorder. Pt was NPO for a Ultrasound.
63 y/o M on HD with h/o skin breakdown on toes d/t vascular insufficiency
digestive system disorder, htn
Patient admitted for digestive system disorder. hx of esophageal stricture, hx of alcohol abuse, CKD, Hep C
Pt admitted for digestive system disorder. Pt was NPO for a Ultrasound.

## 2023-01-31 NOTE — PROGRESS NOTE ADULT - PROBLEM SELECTOR PLAN 9
Septic shock due to pseudomonal PNA with lung abscess, morganella bacteremia and kelbsiella UTI.  Was on Zosyn since 11/25, as per ID in [facility] who recommended 4 week course of antibiotics versus possibly longer pending imaging at 4 weeks (12/23)  - CXR 12/23 - Stable RUL lesion, with new RLL pneumonia   - seen by ID  - completed 6 wks zosyn 1/6  - per d/w ID no further imaging as 6 wks abx sufficient treatment

## 2023-01-31 NOTE — CHART NOTE - NSCHARTNOTEFT_GEN_A_CORE
Vital Signs Last 24 Hrs  T(C): 36.8 (31 Jan 2023 06:30), Max: 37.1 (30 Jan 2023 14:30)  T(F): 98.3 (31 Jan 2023 06:30), Max: 98.7 (30 Jan 2023 14:30)  HR: 92 (31 Jan 2023 06:30) (81 - 98)  BP: 126/60 (31 Jan 2023 06:30) (123/63 - 156/74)  BP(mean): --  RR: 17 (31 Jan 2023 06:30) (16 - 18)  SpO2: 100% (31 Jan 2023 05:11) (98% - 100%)    Parameters below as of 31 Jan 2023 06:30  Patient On (Oxygen Delivery Method): nasal cannula  O2 Flow (L/min): 2                          7.1    8.60  )-----------( 221      ( 31 Jan 2023 07:00 )             22.4   01-31    136  |  103  |  46<H>  ----------------------------<  178<H>  2.8<LL>   |  23  |  3.22<H>    Ca    7.3<L>      31 Jan 2023 07:00  Phos  1.6     01-31  Mg     1.40     01-31    Results and case discussed with Dr. Nichols> 1u prbc ordered with HD as patient currently on heparin gtt, repleted K/Mag, will monitor repeat BMP/aPTT, discussed with RN Vital Signs Last 24 Hrs  T(C): 36.8 (31 Jan 2023 06:30), Max: 37.1 (30 Jan 2023 14:30)  T(F): 98.3 (31 Jan 2023 06:30), Max: 98.7 (30 Jan 2023 14:30)  HR: 92 (31 Jan 2023 06:30) (81 - 98)  BP: 126/60 (31 Jan 2023 06:30) (123/63 - 156/74)  BP(mean): --  RR: 17 (31 Jan 2023 06:30) (16 - 18)  SpO2: 100% (31 Jan 2023 05:11) (98% - 100%)    Parameters below as of 31 Jan 2023 06:30  Patient On (Oxygen Delivery Method): nasal cannula  O2 Flow (L/min): 2                          7.1    8.60  )-----------( 221      ( 31 Jan 2023 07:00 )             22.4   01-31    136  |  103  |  46<H>  ----------------------------<  178<H>  2.8<LL>   |  23  |  3.22<H>    Ca    7.3<L>      31 Jan 2023 07:00  Phos  1.6     01-31  Mg     1.40     01-31    Results and case discussed with Dr. Nichols> 1u prbc ordered with HD as patient currently on heparin gtt, repleted K/Mag, will monitor repeat BMP/aPTT, discussed with RN  Spoke with vascular surgery >tentative plan for OR on friday. As requested, writer informed medical attending and nephrology for clearance.

## 2023-01-31 NOTE — PROGRESS NOTE ADULT - ASSESSMENT
Pt with YUNIEL on advanced CKD, initiated on HD on 1/28/23. Pt. seen during HD today. /65 at time of HD rounds.

## 2023-01-31 NOTE — PROGRESS NOTE ADULT - SUBJECTIVE AND OBJECTIVE BOX
[facility] DIVISION OF KIDNEY DISEASES AND HYPERTENSION --   --------------------------------------------------------------------------------  Chief Complaint: YUNIEL on CKD/Ongoing hemodialysis requirement    24 hour events/subjective: Pt. with YUNIEL on CKD, initiated on HD on 1/28/23. Pt. seen and examined during HD today. Pt. gives history of generalized weakness and body pain. Pt. says his breathing has improved. No  fever, CP or abdominal pain during HD rounds.      PAST HISTORY  --------------------------------------------------------------------------------  No significant changes to PMH, PSH, FHx, SHx, unless otherwise noted    ALLERGIES & MEDICATIONS  --------------------------------------------------------------------------------  Allergies    Tylenol (Other)    Intolerances    Standing Inpatient Medications  albuterol/ipratropium for Nebulization. 3 milliLiter(s) Nebulizer once  cadexomer iodine 0.9% Gel 1 Application(s) Topical daily  calcitriol   Capsule 0.5 MICROGram(s) Oral daily  chlorhexidine 2% Cloths 1 Application(s) Topical two times a day  epoetin mejia-epbx (RETACRIT) Injectable 04913 Unit(s) SubCutaneous <User Schedule>  folic acid 1 milliGRAM(s) Oral daily  heparin  Infusion 1500 Unit(s)/Hr IV Continuous <Continuous>  magnesium oxide 400 milliGRAM(s) Oral three times a day with meals  metoclopramide 5 milliGRAM(s) Oral three times a day  multivitamin 1 Tablet(s) Oral daily  NIFEdipine XL 30 milliGRAM(s) Oral daily  pancrelipase  (CREON  6,000 Lipase Units) 1 Capsule(s) Oral three times a day with meals  pantoprazole    Tablet 40 milliGRAM(s) Oral every 12 hours  polyethylene glycol 3350 17 Gram(s) Oral two times a day  potassium chloride   Powder 40 milliEquivalent(s) Oral once  senna 2 Tablet(s) Oral at bedtime  sucralfate 1 Gram(s) Oral every 6 hours  thiamine 100 milliGRAM(s) Oral daily    PRN Inpatient Medications  bisacodyl 5 milliGRAM(s) Oral every 12 hours PRN  ondansetron Injectable 4 milliGRAM(s) IV Push every 6 hours PRN  oxyCODONE    IR 10 milliGRAM(s) Oral every 4 hours PRN  sodium chloride 0.9% lock flush 10 milliLiter(s) IV Push every 1 hour PRN  tiZANidine 2 milliGRAM(s) Oral every 8 hours PRN    REVIEW OF SYSTEMS  --------------------------------------------------------------------------------  Gen: See HPI, no fever  Respiratory: See HPI  CV: No chest pain  GI: No abdominal pain  MSK: No edema  Neuro: No dizziness    VITALS/PHYSICAL EXAM  --------------------------------------------------------------------------------  T(C): 36.8 (01-31-23 @ 06:30), Max: 37.1 (01-30-23 @ 14:30)  HR: 92 (01-31-23 @ 06:30) (81 - 98)  BP: 126/60 (01-31-23 @ 06:30) (123/63 - 156/74)  RR: 17 (01-31-23 @ 06:30) (16 - 18)  SpO2: 100% (01-31-23 @ 05:11) (100% - 100%)  Wt(kg): --    Weight (kg): 56.9 (01-31-23 @ 05:11)    01-30-23 @ 07:01  -  01-31-23 @ 07:00  --------------------------------------------------------  IN: 1148 mL / OUT: 1800 mL / NET: -652 mL    Physical Exam:    Gen resting  HEENT: no JVD  Pulm: CTA B/L  CV: S1S2+  Abd: Soft  Ext: Left foot dressing seen  Neuro: Awake and alert  Dialysis access: Left IJ non tunneled HD catheter being used for HD, LUPANFILO AVF+    LABS/STUDIES  --------------------------------------------------------------------------------              7.1    8.60  >-----------<  221      [01-31-23 @ 07:00]              22.4     136  |  103  |  46  ----------------------------<  178      [01-31-23 @ 07:00]  2.8   |  23  |  3.22        Ca     7.3     [01-31-23 @ 07:00]      iCa    1.15     [01-30 @ 06:00]      Mg     1.40     [01-31-23 @ 07:00]      Phos  1.6     [01-31-23 @ 07:00]    HBsAb 10.9      [01-28-23 @ 17:41]  HBsAg Nonreact      [01-28-23 @ 17:41]  HBcAb Reactive      [01-28-23 @ 17:41]  HCV 8.31, Reactive      [01-28-23 @ 17:41]

## 2023-01-31 NOTE — PROGRESS NOTE ADULT - SUBJECTIVE AND OBJECTIVE BOX
[facility] Division of Hospital Medicine  Parish Nichols MD  Pager [phone]      Patient is a 62y old  Male who presents with a chief complaint of intractable N/V , esophageal stent removal (31 Jan 2023 10:40)      SUBJECTIVE / OVERNIGHT EVENTS:    hgb 7.1 this am. pt denies any melena or brbpr. no new complaints     ADDITIONAL REVIEW OF SYSTEMS:    RESPIRATORY: No cough, wheezing, chills or hemoptysis; No shortness of breath  CARDIOVASCULAR: No chest pain, palpitations, dizziness, or leg swelling  GASTROINTESTINAL: No abdominal or epigastric pain. No nausea, vomiting, or hematemesis; No diarrhea or constipation. No melena or hematochezia.    MEDICATIONS  (STANDING):  albuterol/ipratropium for Nebulization. 3 milliLiter(s) Nebulizer once  cadexomer iodine 0.9% Gel 1 Application(s) Topical daily  calcitriol   Capsule 0.5 MICROGram(s) Oral daily  chlorhexidine 2% Cloths 1 Application(s) Topical two times a day  epoetin mejia-epbx (RETACRIT) Injectable 05561 Unit(s) SubCutaneous <User Schedule>  folic acid 1 milliGRAM(s) Oral daily  heparin  Infusion 1500 Unit(s)/Hr (15 mL/Hr) IV Continuous <Continuous>  magnesium oxide 400 milliGRAM(s) Oral three times a day with meals  metoclopramide 5 milliGRAM(s) Oral three times a day  multivitamin 1 Tablet(s) Oral daily  NIFEdipine XL 30 milliGRAM(s) Oral daily  pancrelipase  (CREON  6,000 Lipase Units) 1 Capsule(s) Oral three times a day with meals  pantoprazole    Tablet 40 milliGRAM(s) Oral every 12 hours  polyethylene glycol 3350 17 Gram(s) Oral two times a day  potassium phosphate / sodium phosphate Powder (PHOS-NaK) 1 Packet(s) Oral once  senna 2 Tablet(s) Oral at bedtime  sucralfate 1 Gram(s) Oral every 6 hours  thiamine 100 milliGRAM(s) Oral daily    MEDICATIONS  (PRN):  bisacodyl 5 milliGRAM(s) Oral every 12 hours PRN Constipation  ondansetron Injectable 4 milliGRAM(s) IV Push every 6 hours PRN Nausea and/or Vomiting  oxyCODONE    IR 10 milliGRAM(s) Oral every 4 hours PRN Moderate Pain (4 - 6)-Severe pain (7-10)  sodium chloride 0.9% lock flush 10 milliLiter(s) IV Push every 1 hour PRN Pre/post blood products, medications, blood draw, and to maintain line patency  tiZANidine 2 milliGRAM(s) Oral every 8 hours PRN Muscle Cramps      CAPILLARY BLOOD GLUCOSE      POCT Blood Glucose.: 126 mg/dL (30 Jan 2023 13:42)    I&O's Summary    30 Jan 2023 07:01  -  31 Jan 2023 07:00  --------------------------------------------------------  IN: 1148 mL / OUT: 1800 mL / NET: -652 mL    31 Jan 2023 07:01  -  31 Jan 2023 12:33  --------------------------------------------------------  IN: 985 mL / OUT: 1700 mL / NET: -715 mL        PHYSICAL EXAM:  Vital Signs Last 24 Hrs  T(C): 36.6 (31 Jan 2023 10:00), Max: 37.1 (30 Jan 2023 14:30)  T(F): 97.9 (31 Jan 2023 10:00), Max: 98.7 (30 Jan 2023 14:30)  HR: 83 (31 Jan 2023 10:00) (81 - 98)  BP: 141/71 (31 Jan 2023 10:00) (123/63 - 156/74)  BP(mean): --  RR: 17 (31 Jan 2023 10:00) (16 - 18)  SpO2: 100% (31 Jan 2023 10:00) (100% - 100%)    Parameters below as of 31 Jan 2023 10:00  Patient On (Oxygen Delivery Method): nasal cannula  O2 Flow (L/min): 2        CONSTITUTIONAL: NAD,  EYES: PERRLA; conjunctiva and sclera clear  ENMT: Moist oral mucosa, no pharyngeal injection or exudates;   NECK: Supple, no palpable masses;  RESPIRATORY: Normal respiratory effort; basilar crackles bilaterally. RIJ triple lumen HD cath in place  CARDIOVASCULAR: Regular rate and rhythm, normal S1 and S2, no murmur/rub/gallop; No lower extremity edema; unable to palpate DT pulse b/l   ABDOMEN: Nontender to palpation, normoactive bowel sounds, no rebound/guarding;   MUSCLOSKELETAL:   no clubbing or cyanosis of digits; no joint swelling or tenderness to palpation  PSYCH: A+O to person, place  NEUROLOGY: CN 2-12 are intact and symmetric; no gross sensory deficits;   SKIN: L heel wound dressing c/d/i     LABS:                        7.1    8.60  )-----------( 221      ( 31 Jan 2023 07:00 )             22.4     01-31    134<L>  |  100  |  18  ----------------------------<  144<H>  3.4<L>   |  24  |  1.75<H>    Ca    7.5<L>      31 Jan 2023 11:36  Phos  1.2     01-31  Mg     1.50     01-31      PT/INR - ( 31 Jan 2023 07:00 )   PT: 11.7 sec;   INR: 1.01 ratio         PTT - ( 31 Jan 2023 07:00 )  PTT:44.9 sec            RADIOLOGY & ADDITIONAL TESTS:  Results Reviewed:   Imaging Personally Reviewed:  Electrocardiogram Personally Reviewed:    COORDINATION OF CARE:  Care Discussed with Consultants/Other Providers [Y/N]:  Prior or Outpatient Records Reviewed [Y/N]:

## 2023-01-31 NOTE — PROVIDER CONTACT NOTE (CRITICAL VALUE NOTIFICATION) - TEST AND RESULT REPORTED:
hgb 6.9
potassium 2.6
Calcium 6.5. Troponin 210
Potassium: 2.8
Serum Blood Glucose:49
hemoglobin was 6.7 hematocrit was 20.9

## 2023-01-31 NOTE — PROVIDER CONTACT NOTE (CRITICAL VALUE NOTIFICATION) - PERSON GIVING RESULT:
TREVOR Lassiter
Nellie Fraga / Lab
NATHAN Resendez
TENNILLE Pedro (Serology)
Maximo R
Joseph Wiley from labs

## 2023-01-31 NOTE — PROGRESS NOTE ADULT - PROBLEM SELECTOR PLAN 4
Diff inclu  GIB iso PUD and medication non-adherence (intermittently refusing PPI/ Carafate) +/- anemia of chronic disease. Work up not consistent w/ hemolysis   -CT Abd pelvis w/o RP bleed  -Previous EGD 12/16/22 w/ esophagitis  -Repeat EGD 1/13 w/o active bleeding  -Colonoscopy 1/13 w/o active bleeding. W/ mucosal ulceration. Biopsied  - Bx-Colonic mucosa with active chronic colitis  -s/p 5 units PRBC on this admission (last 1/31) cont monitor Hgb daily, transfuse to hgb>7  -c/w PPI BID  -C/w Carafate 1G Q6H  -Outpatient GI follow up w/ repeat upper endoscopy in 6-8 weeks and colonoscopy  - keep type and screen active, if <7, will need another unit

## 2023-01-31 NOTE — PROGRESS NOTE ADULT - PROBLEM SELECTOR PLAN 1
Pt. with YUNIEL on CKD, initiated on HD on 1/28/23. Pt. seen and examined during HD today. BP stable at time of HD rounds. HD catheter functioning well. Monitor BP, labs and urine output. Avoid any potential nephrotoxins. Dose medications as per eGFR.

## 2023-01-31 NOTE — PROGRESS NOTE ADULT - PROBLEM SELECTOR PLAN 1
b/l heel wounds and right 2nd and 3rd toe wound.   - Xray foot negative for signs of OM  -podiatry following- concern for poor wound healing in the setting PAD- rec local wound care until revascularization   - BARRY/PVR result reviewed; mild stenosis in LLE, moderate stenosis in RLE.   - S/p RLE angio by vascular on 1/20 with PT plasty, possible need for pop-PT bypass - tentatively scheduled for Fri   - B/L LE vein mapping-No evidence of thrombosis or significant venous wall thickening noted within the right and left great saphenous and short saphenous veins  - Appreciate cards clearance on 1/24  - pt is medically optimized for planned surgery

## 2023-02-01 NOTE — PROGRESS NOTE ADULT - ASSESSMENT
62M EtoH misuse c/b chronic pancreatitis, HCV s/p treatment (SVR), emphysema, benign esophageal stricture (s/p stent on 4/2022) admitted at [facility] w/ septic shock septic shock due to pseudomonal PNA, lung abscess, Morganella bacteremia and Klebsiella UTI (now s/p 6 wks zosyn ended 1/6) w/ hospital course c/b YUNIEL on CKD requiring HD and MICU stay, transferred here for intractable nausea/vomiting and esophageal stent removal, now s/p removal w/ EGD sig for esophagitis/gastric ulcer. Hospital course now c/b acute on chronic anemia required multiple transfusion. Now patient is noted to have skin breakdown in Right toes concerning for underline infection secondary to vascular insufficiency. Plan for repeat angiogram, and potential repeat  pop-PT bypass surgery. Course c.b by Right UE DVT, limiting IV access.

## 2023-02-01 NOTE — PROGRESS NOTE ADULT - SUBJECTIVE AND OBJECTIVE BOX
SURGERY  Pager: #[phone]    INTERVAL EVENTS/SUBJECTIVE: No acute events overnight.     ______________________________________________  OBJECTIVE:   T(C): 36.7 (02-01-23 @ 09:32), Max: 36.7 (01-31-23 @ 21:59)  HR: 89 (02-01-23 @ 09:32) (89 - 95)  BP: 125/59 (02-01-23 @ 09:32) (114/61 - 133/76)  RR: 18 (02-01-23 @ 09:32) (16 - 18)  SpO2: 100% (02-01-23 @ 09:32) (100% - 100%)  Wt(kg): --  CAPILLARY BLOOD GLUCOSE        I&O's Detail    31 Jan 2023 07:01  -  01 Feb 2023 07:00  --------------------------------------------------------  IN:    Heparin: 105 mL    Heparin: 48 mL    Oral Fluid: 720 mL    Other (mL): 700 mL  Total IN: 1573 mL    OUT:    Other (mL): 1700 mL  Total OUT: 1700 mL    Total NET: -127 mL      Physical Exam:  Gen: resting in bed comfortably in NAD  Chest: no increased WOB, regular inspiratory effort   Abdomen: Soft, nontender, nondistended with no rebound tenderness or guarding.   Vascular: L groin soft and flat, LE +DP/PT signals. LUE with palpable radial and ulnar pulses, sensation/motor intact. Palpable over AVF   Neuro: awake, alert  ______________________________________________  LABS:  CBC Full  -  ( 01 Feb 2023 06:00 )  WBC Count : 7.26 K/uL  RBC Count : 2.83 M/uL  Hemoglobin : 8.5 g/dL  Hematocrit : 26.4 %  Platelet Count - Automated : 219 K/uL  Mean Cell Volume : 93.3 fL  Mean Cell Hemoglobin : 30.0 pg  Mean Cell Hemoglobin Concentration : 32.2 gm/dL  Auto Neutrophil # : x  Auto Lymphocyte # : x  Auto Monocyte # : x  Auto Eosinophil # : x  Auto Basophil # : x  Auto Neutrophil % : x  Auto Lymphocyte % : x  Auto Monocyte % : x  Auto Eosinophil % : x  Auto Basophil % : x    02-01    138  |  105  |  30<H>  ----------------------------<  86  3.3<L>   |  26  |  2.83<H>    Ca    7.5<L>      01 Feb 2023 06:00  Phos  2.2     02-01  Mg     1.50     02-01      _____________________________________________  RADIOLOGY:

## 2023-02-01 NOTE — PROGRESS NOTE ADULT - ASSESSMENT
62M hx esophageal stricture, chronic pancreatitis, originally presented with n/v, esophageal stent removal in December, course c/b septic shock. Patient s/p first stage left brachiobasilic AVF on 1/5 with Dr. Oro. Vascular surgery reconsulted in setting of bilateral foot wounds and reported nonpalpable pulses. S/p RLE angio on 1/20 with PT plasty, now planning for possible pop-PT bypass    PLAN:  - Vein mapping completed, reviewed - will need repeat angiogram in OR with pedal access given small veins on duplex   - Fistula evaluated, no current intervention   - Pt. now s/p tunneled catheter placement with IR, getting HD - will plan for OR, tentatively on schedule Friday  - Please document optimization from medicine, cardiology, nephrology. Cardiology optimization noted from 1/24, medicine 1/31.   - Patient planned for OR Friday, appreciate coordination for HD on Thursday for optimization prior to OR     C Team Surgery  #[id number]

## 2023-02-01 NOTE — PROGRESS NOTE ADULT - SUBJECTIVE AND OBJECTIVE BOX
[facility] Division of Hospital Medicine  Parish Nichols MD  Pager [phone]      Patient is a 62y old  Male who presents with a chief complaint of intractable N/V , esophageal stent removal (31 Jan 2023 12:33)      SUBJECTIVE / OVERNIGHT EVENTS:    no acute event. no new complaint     ADDITIONAL REVIEW OF SYSTEMS:    RESPIRATORY: No cough, wheezing, chills or hemoptysis; No shortness of breath  CARDIOVASCULAR: No chest pain, palpitations, dizziness, or leg swelling  GASTROINTESTINAL: No abdominal or epigastric pain. No nausea, vomiting, or hematemesis; No diarrhea or constipation. No melena or hematochezia.    MEDICATIONS  (STANDING):  albuterol/ipratropium for Nebulization. 3 milliLiter(s) Nebulizer once  cadexomer iodine 0.9% Gel 1 Application(s) Topical daily  calcitriol   Capsule 0.5 MICROGram(s) Oral daily  chlorhexidine 2% Cloths 1 Application(s) Topical two times a day  epoetin mejia-epbx (RETACRIT) Injectable 40397 Unit(s) SubCutaneous <User Schedule>  folic acid 1 milliGRAM(s) Oral daily  heparin  Infusion 1600 Unit(s)/Hr (16 mL/Hr) IV Continuous <Continuous>  magnesium sulfate  IVPB 2 Gram(s) IV Intermittent once  metoclopramide 5 milliGRAM(s) Oral three times a day  multivitamin 1 Tablet(s) Oral daily  mupirocin 2% Ointment 1 Application(s) Topical two times a day  NIFEdipine XL 30 milliGRAM(s) Oral daily  pancrelipase  (CREON  6,000 Lipase Units) 1 Capsule(s) Oral three times a day with meals  pantoprazole    Tablet 40 milliGRAM(s) Oral every 12 hours  polyethylene glycol 3350 17 Gram(s) Oral two times a day  potassium chloride   Powder 40 milliEquivalent(s) Oral once  potassium phosphate IVPB 15 milliMole(s) IV Intermittent once  senna 2 Tablet(s) Oral at bedtime  sucralfate 1 Gram(s) Oral every 6 hours  thiamine 100 milliGRAM(s) Oral daily    MEDICATIONS  (PRN):  bisacodyl 5 milliGRAM(s) Oral every 12 hours PRN Constipation  ondansetron Injectable 4 milliGRAM(s) IV Push every 6 hours PRN Nausea and/or Vomiting  oxyCODONE    IR 10 milliGRAM(s) Oral every 4 hours PRN Moderate Pain (4 - 6)-Severe pain (7-10)  sodium chloride 0.9% lock flush 10 milliLiter(s) IV Push every 1 hour PRN Pre/post blood products, medications, blood draw, and to maintain line patency  tiZANidine 2 milliGRAM(s) Oral every 8 hours PRN Muscle Cramps      CAPILLARY BLOOD GLUCOSE        I&O's Summary    31 Jan 2023 07:01  -  01 Feb 2023 07:00  --------------------------------------------------------  IN: 1573 mL / OUT: 1700 mL / NET: -127 mL        PHYSICAL EXAM:  Vital Signs Last 24 Hrs  T(C): 36.7 (01 Feb 2023 09:32), Max: 36.7 (31 Jan 2023 21:59)  T(F): 98 (01 Feb 2023 09:32), Max: 98.1 (31 Jan 2023 21:59)  HR: 89 (01 Feb 2023 09:32) (89 - 95)  BP: 125/59 (01 Feb 2023 09:32) (114/61 - 133/76)  BP(mean): --  RR: 18 (01 Feb 2023 09:32) (16 - 18)  SpO2: 100% (01 Feb 2023 09:32) (100% - 100%)    Parameters below as of 01 Feb 2023 09:32  Patient On (Oxygen Delivery Method): room air        CONSTITUTIONAL: NAD,  EYES: PERRLA; conjunctiva and sclera clear  ENMT: Moist oral mucosa, no pharyngeal injection or exudates;   NECK: Supple, no palpable masses;  RESPIRATORY: Normal respiratory effort; basilar crackles bilaterally. RIJ triple lumen HD cath in place  CARDIOVASCULAR: Regular rate and rhythm, normal S1 and S2, no murmur/rub/gallop; No lower extremity edema; unable to palpate DT pulse b/l   ABDOMEN: Nontender to palpation, normoactive bowel sounds, no rebound/guarding;   MUSCLOSKELETAL:   no clubbing or cyanosis of digits; no joint swelling or tenderness to palpation  PSYCH: A+O to person, place  NEUROLOGY: CN 2-12 are intact and symmetric; no gross sensory deficits;   SKIN: L heel wound dressing c/d/i     LABS:                        8.5    7.26  )-----------( 219      ( 01 Feb 2023 06:00 )             26.4     02-01    138  |  105  |  30<H>  ----------------------------<  86  3.3<L>   |  26  |  2.83<H>    Ca    7.5<L>      01 Feb 2023 06:00  Phos  2.2     02-01  Mg     1.50     02-01      PT/INR - ( 01 Feb 2023 06:00 )   PT: 12.8 sec;   INR: 1.10 ratio         PTT - ( 01 Feb 2023 06:00 )  PTT:65.8 sec          Culture - Nose (collected 29 Jan 2023 20:13)  Source: .Nose  Final Report (31 Jan 2023 17:16):    No staphylococcus aureus isolated.    "PCR is more Sensitive for identifying MRSA/MSSA."        RADIOLOGY & ADDITIONAL TESTS:  Results Reviewed:   Imaging Personally Reviewed:  Electrocardiogram Personally Reviewed:    COORDINATION OF CARE:  Care Discussed with Consultants/Other Providers [Y/N]:  Prior or Outpatient Records Reviewed [Y/N]:

## 2023-02-02 NOTE — PROGRESS NOTE ADULT - SUBJECTIVE AND OBJECTIVE BOX
SURGERY  Pager: #[phone]    INTERVAL EVENTS/SUBJECTIVE: No acute events overnight. Patient seen and examined at bedside.     ______________________________________________  OBJECTIVE:   T(C): 37 (02-02-23 @ 05:44), Max: 37.2 (02-01-23 @ 21:35)  HR: 86 (02-02-23 @ 05:44) (86 - 100)  BP: 141/72 (02-02-23 @ 05:44) (125/59 - 141/72)  RR: 17 (02-02-23 @ 05:44) (17 - 18)  SpO2: 100% (02-02-23 @ 05:44) (100% - 100%)  Wt(kg): --  CAPILLARY BLOOD GLUCOSE        I&O's Detail    01 Feb 2023 07:01  -  02 Feb 2023 07:00  --------------------------------------------------------  IN:    Oral Fluid: 200 mL  Total IN: 200 mL    OUT:    Voided (mL): 400 mL  Total OUT: 400 mL    Total NET: -200 mL          Physical Exam:  Gen: resting in bed comfortably in NAD  Chest: no increased WOB, regular inspiratory effort   Abdomen: Soft, nontender, nondistended with no rebound tenderness or guarding.   Vascular: L groin soft and flat, LE +DP/PT signals. LUE with palpable radial and ulnar pulses, sensation/motor intact. Palpable over AVF   Neuro: awake, alert  ______________________________________________  LABS:  CBC Full  -  ( 02 Feb 2023 06:04 )  WBC Count : 8.46 K/uL  RBC Count : 2.79 M/uL  Hemoglobin : 8.3 g/dL  Hematocrit : 27.2 %  Platelet Count - Automated : 231 K/uL  Mean Cell Volume : 97.5 fL  Mean Cell Hemoglobin : 29.7 pg  Mean Cell Hemoglobin Concentration : 30.5 gm/dL  Auto Neutrophil # : x  Auto Lymphocyte # : x  Auto Monocyte # : x  Auto Eosinophil # : x  Auto Basophil # : x  Auto Neutrophil % : x  Auto Lymphocyte % : x  Auto Monocyte % : x  Auto Eosinophil % : x  Auto Basophil % : x    02-01    138  |  105  |  30<H>  ----------------------------<  86  3.3<L>   |  26  |  2.83<H>    Ca    7.5<L>      01 Feb 2023 06:00  Phos  2.2     02-01  Mg     1.50     02-01      _____________________________________________  RADIOLOGY:

## 2023-02-02 NOTE — PROGRESS NOTE ADULT - ASSESSMENT
62M hx esophageal stricture, chronic pancreatitis, originally presented with n/v, esophageal stent removal in December, course c/b septic shock. Patient s/p first stage left brachiobasilic AVF on 1/5 with Dr. Oro. Vascular surgery reconsulted in setting of bilateral foot wounds and reported nonpalpable pulses. S/p RLE angio on 1/20 with PT plasty, now planning for possible pop-PT bypass    PLAN:  - Vein mapping completed, reviewed - will need repeat angiogram in OR with pedal access given small veins on duplex   - Fistula evaluated, no current intervention   - Pt. now s/p tunneled catheter placement with IR, getting HD - will plan for OR 2/2, Friday   - Please document optimization from medicine, cardiology, nephrology. Cardiology optimization noted from 1/24, medicine 1/31.   - Patient planned for OR Friday, appreciate coordination for HD on Thursday for optimization prior to OR   - Please pre-op patient, NPO past midnight, 1 AM labs, CBC, BMP, coags, active T+S, recent COVID. Heparin gtt can be held at 6am.    C Team Surgery  #[id number]

## 2023-02-02 NOTE — PROGRESS NOTE ADULT - PROBLEM SELECTOR PLAN 1
b/l heel wounds and right 2nd and 3rd toe wound.   - Xray foot negative for signs of OM  -podiatry following- concern for poor wound healing in the setting PAD- rec local wound care until revascularization   - BARRY/PVR result reviewed; mild stenosis in LLE, moderate stenosis in RLE.   - S/p RLE angio by vascular on 1/20 with PT plasty, possible need for pop-PT bypass - tentatively scheduled for Fri   - B/L LE vein mapping-No evidence of thrombosis or significant venous wall thickening noted within the right and left great saphenous and short saphenous veins  - Appreciate cards clearance on 1/24  - pt is medically optimized for planned surgery.

## 2023-02-02 NOTE — PROGRESS NOTE ADULT - PROBLEM SELECTOR PLAN 9
Septic shock due to pseudomonal PNA with lung abscess, morganella bacteremia and kelbsiella UTI.  [facility] versus possibly longer pending imaging at 4 weeks (12/23)  - CXR 12/23 - Stable RUL lesion, with new RLL pneumonia   - seen by ID  - completed 6 wks zosyn 1/6  - per d/w ID no further imaging as 6 wks abx sufficient treatment

## 2023-02-02 NOTE — PROGRESS NOTE ADULT - ASSESSMENT
62M EtoH misuse c/b chronic pancreatitis, HCV s/p treatment (SVR), emphysema, benign esophageal stricture (s/p stent on 4/2022) admitted at [facility] w/ septic shock septic shock due to pseudomonal PNA, lung abscess, Morganella bacteremia and Klebsiella UTI (now s/p 6 wks zosyn ended 1/6) w/ hospital course c/b YUNIEL on CKD requiring HD and MICU stay, transferred here for intractable nausea/vomiting and esophageal stent removal, now s/p removal w/ EGD sig for esophagitis/gastric ulcer. Hospital course now c/b acute on chronic anemia required multiple transfusion. Now patient is noted to have skin breakdown in Right toes concerning for underline infection secondary to vascular insufficiency. Plan for repeat angiogram, and potential repeat  pop-PT bypass surgery. Course c.b by Right UE DVT, limiting IV access.

## 2023-02-02 NOTE — PROGRESS NOTE ADULT - PROBLEM SELECTOR PLAN 3
Pt with hypophosphatemia. Pt was receiving sevelamer, was discontinued on 1/29/23. Serum phos is low at 1.8 today. Recommend PO repletion. Monitor serum phosphorus level.    If you have any questions, please feel free to contact me  Treva Mohr  Nephrology Fellow  [phone] / [employer] Teams(Preferred)  (After 5pm or on weekends please page the on-call fellow) Pt with hypophosphatemia. Pt was receiving sevelamer, was discontinued on 1/29/23. Serum phos is low at 1.8 today. Recommend oral phosphorus repletion. Monitor serum phosphorus level.    If you have any questions, please feel free to contact me  Treva Mohr  Nephrology Fellow  [phone] / [employer] Teams(Preferred)  (After 5pm or on weekends please page the on-call fellow)

## 2023-02-02 NOTE — CHART NOTE - NSCHARTNOTEFT_GEN_A_CORE
Reason for Follow-Up Assessment: Severe Malnutrition / Requested f/u due to  requests    It was reported that patient was not happy with soft and bite sized diet and was requesting for a diet change. Per chart review, diet consistency was recently upgraded to regular. Visited with patient today, and he did not verbalize any complaints surrounding current therapeutic diet, but patient was requesting additional portions of food. Patient appears to have poor health literacy and overall poor understanding of renal diet parameters, lack of interest in discussing diet education.  Patient requesting for more food, discussed with  that additional protein portions can be arranged given he is now on HD; explained that should he want additional portions beyond what is sent on his meal tray, he can request as needed. Patient with h/o hoarding food and pre-packaged meal / beverage items, which has been previously addressed with IDT on unit. Per PA, patient to be NPO for upcoming procedure.    2/2 Hospitalist Attending --- 62M EtoH misuse c/b chronic pancreatitis, HCV s/p treatment (SVR), emphysema, benign esophageal stricture (s/p stent on 4/2022) admitted at [facility] w/ septic shock septic shock due to pseudomonal PNA, lung abscess, Morganella bacteremia and Klebsiella UTI (now s/p 6 wks zosyn ended 1/6) w/ hospital course c/b YUNIEL on CKD requiring HD and MICU stay, transferred here for intractable nausea/vomiting and esophageal stent removal, now s/p removal w/ EGD sig for esophagitis/gastric ulcer. Hospital course now c/b acute on chronic anemia required multiple transfusion. Now patient is noted to have skin breakdown in Right toes concerning for underline infection secondary to vascular insufficiency. Plan for repeat angiogram, and potential repeat  pop-PT bypass surgery. Course c/b by Right UE DVT, limiting IV access. Patient on Reglan.      Source: [ X ] Patient [ ] Family [ ] RN [ X ] Chart, PA     Diet, NPO after Midnight:      NPO Start Date: 02-Feb-2023,   NPO Start Time: 23:59 (02-02-23 @ 11:29)  Diet, Regular:   1000mL Fluid Restriction ([id number])  For patients receiving Renal Replacement - No Protein Restr, No Conc K, No Conc Phos, Low Sodium (RENAL)  Low Sodium  No Caffeine  Supplement Feeding Modality:  Oral  Nepro Cans or Servings Per Day:  1       Frequency:  Two Times a day (02-01-23 @ 18:32)    Nepro 2x daily (provides add'l 850 kcals, 38.2g protein).     GI: WDL. No GI distress reported i.e. nausea, vomiting, diarrhea.     PO intake:  [ ] Poor < 50%  [X] Fair 50-75% [X] Good  %     Enteral /Parenteral Nutrition:       [X ] n/a    Anthropometrics: Height (cm): 188 (01-20), 177.8 (11-25)  Weight (kg): 56.9 (01-31), 42.3 (11-25)  BMI (kg/m2): 16.1 (01-31), 12 (01-20), 13.4 (11-25)    Weight Hx: 1/20 - 55.9kg      1/11 - 55.9kg      1/5 - 59.1kg      12/16/22 - 59.1kg (dosing weights)    Edema: 2+ edema to Rt arm     Pressure Injuries: Stage II to Rt buttock    _______________ Pertinent Medications_______________  MEDICATIONS  (STANDING):  albuterol/ipratropium for Nebulization. 3 milliLiter(s) Nebulizer once  cadexomer iodine 0.9% Gel 1 Application(s) Topical daily  calcitriol   Capsule 0.5 MICROGram(s) Oral daily  chlorhexidine 2% Cloths 1 Application(s) Topical two times a day  epoetin mejia-epbx (RETACRIT) Injectable 41499 Unit(s) SubCutaneous <User Schedule>  folic acid 1 milliGRAM(s) Oral daily  heparin  Infusion 1600 Unit(s)/Hr (16 mL/Hr) IV Continuous <Continuous>  metoclopramide 5 milliGRAM(s) Oral three times a day  multivitamin 1 Tablet(s) Oral daily  mupirocin 2% Ointment 1 Application(s) Topical two times a day  NIFEdipine XL 30 milliGRAM(s) Oral daily  pancrelipase  (CREON  6,000 Lipase Units) 1 Capsule(s) Oral three times a day with meals  pantoprazole    Tablet 40 milliGRAM(s) Oral every 12 hours  polyethylene glycol 3350 17 Gram(s) Oral two times a day  senna 2 Tablet(s) Oral at bedtime  sucralfate 1 Gram(s) Oral every 6 hours  thiamine 100 milliGRAM(s) Oral daily    __________________ Pertinent Labs__________________   02-02 Na138 mmol/L Glu 122 mg/dL<H> K+ 3.7 mmol/L Cr  3.70 mg/dL<H> BUN 41 mg/dL<H> 02-02 Phos 1.8 mg/dL<L>  POCT Blood Glucose.: 126 mg/dL (01-30-23 @ 13:42)      Estimated Needs:   [X] no change since previous assessment  [ ] recalculated:     Nutrition Diagnoses: Increased Nutrient Needs, Severe Malnutrition, Altered Nutrition Related Labs    Monitoring and Evaluation:     [ x ] Tolerance to diet prescription / adequacy of meal intake  [ x ] Weight trends   [ x ] Pertinent labs    Recommendations:  1) Diet / Supplement Changes: Suggest continuing Renal Replacement; continue Nepro 2x daily (850 kcals, 38.2g protein); provide extra proteins as feasible. Monitor Phosphorus and consider liberalizing phos restriction if remains low.  2) Please consistently document % meal intake in nursing flowsheets.     Nisha Nelson, MS, RDN, CDN  Pager [phone]  Also available on MS Teams

## 2023-02-02 NOTE — PROGRESS NOTE ADULT - ATTENDING COMMENTS
Pt. with YUNIEL on CKD, initiated on HD on 1/28/23. Assessment and plan for YUNIEL on CKD/HD, anemia and hypophosphatemia as outlined above. Pt. clinically stable. Labs reviewed. Plan for HD treatment today. Monitor BP, labs and urine output. Avoid any potential nephrotoxins. Dose medications as per eGFR.

## 2023-02-02 NOTE — PROGRESS NOTE ADULT - SUBJECTIVE AND OBJECTIVE BOX
[facility] Division of Kidney Diseases & Hypertension  FOLLOW UP NOTE  [phone]--------------------------------------------------------------------------------  Chief Complaint: YUNIEL on CKD/Ongoing hemodialysis requirement    24 hour events/subjective: Pt was seen and evaluated bedside today. Pt reports generalized weakness and diffuse body pains. Last HD treatment was on 2/2/23. No fever, CP or abdominal pain reported during rounds today.      PAST HISTORY  --------------------------------------------------------------------------------  No significant changes to PMH, PSH, FHx, SHx, unless otherwise noted    ALLERGIES & MEDICATIONS  --------------------------------------------------------------------------------  Allergies    Tylenol (Other)    Intolerances      Standing Inpatient Medications  albuterol/ipratropium for Nebulization. 3 milliLiter(s) Nebulizer once  cadexomer iodine 0.9% Gel 1 Application(s) Topical daily  calcitriol   Capsule 0.5 MICROGram(s) Oral daily  chlorhexidine 2% Cloths 1 Application(s) Topical two times a day  epoetin mejia-epbx (RETACRIT) Injectable 90074 Unit(s) SubCutaneous <User Schedule>  folic acid 1 milliGRAM(s) Oral daily  heparin  Infusion 1600 Unit(s)/Hr IV Continuous <Continuous>  metoclopramide 5 milliGRAM(s) Oral three times a day  multivitamin 1 Tablet(s) Oral daily  mupirocin 2% Ointment 1 Application(s) Topical two times a day  NIFEdipine XL 30 milliGRAM(s) Oral daily  pancrelipase  (CREON  6,000 Lipase Units) 1 Capsule(s) Oral three times a day with meals  pantoprazole    Tablet 40 milliGRAM(s) Oral every 12 hours  polyethylene glycol 3350 17 Gram(s) Oral two times a day  senna 2 Tablet(s) Oral at bedtime  sucralfate 1 Gram(s) Oral every 6 hours  thiamine 100 milliGRAM(s) Oral daily    PRN Inpatient Medications  bisacodyl 5 milliGRAM(s) Oral every 12 hours PRN  ondansetron Injectable 4 milliGRAM(s) IV Push every 6 hours PRN  oxyCODONE    IR 10 milliGRAM(s) Oral every 4 hours PRN  sodium chloride 0.9% lock flush 10 milliLiter(s) IV Push every 1 hour PRN  tiZANidine 2 milliGRAM(s) Oral every 8 hours PRN      REVIEW OF SYSTEMS  --------------------------------------------------------------------------------  Gen: See HPI, no fever  Respiratory: See HPI  CV: No chest pain  GI: No abdominal pain  MSK: No edema  Neuro: No dizziness    VITALS/PHYSICAL EXAM  --------------------------------------------------------------------------------  T(C): 36.8 (02-02-23 @ 10:52), Max: 37.2 (02-01-23 @ 21:35)  HR: 81 (02-02-23 @ 10:52) (81 - 100)  BP: 127/62 (02-02-23 @ 10:52) (127/62 - 141/72)  RR: 18 (02-02-23 @ 10:52) (17 - 18)  SpO2: 100% (02-02-23 @ 10:52) (100% - 100%)  Wt(kg): --      02-01-23 @ 07:01  -  02-02-23 @ 07:00  --------------------------------------------------------  IN: 392 mL / OUT: 800 mL / NET: -408 mL    02-02-23 @ 07:01  -  02-02-23 @ 15:40  --------------------------------------------------------  IN: 592 mL / OUT: 0 mL / NET: 592 mL      Physical Exam:  HEENT: no JVD  Pulm: CTA B/L  CV: S1S2+  Abd: Soft  Ext: Left foot dressing seen  Neuro: Awake and alert  Dialysis access: Left IJ non tunneled HD catheter being used for HD, LUE AVF with +thrill and weak bruit    LABS/STUDIES  --------------------------------------------------------------------------------              8.3    8.46  >-----------<  231      [02-02-23 @ 06:04]              27.2     138  |  104  |  41  ----------------------------<  122      [02-02-23 @ 06:04]  3.7   |  22  |  3.70        Ca     7.4     [02-02-23 @ 06:04]      Mg     1.30     [02-02-23 @ 06:04]      Phos  1.8     [02-02-23 @ 06:04]      PT/INR: PT 11.5 , INR 0.99       [02-02-23 @ 06:04]  PTT: 29.7       [02-02-23 @ 06:04]    Creatinine Trend:  SCr 3.70 [02-02 @ 06:04]  SCr 2.83 [02-01 @ 06:00]  SCr 1.75 [01-31 @ 11:36]  SCr 3.22 [01-31 @ 07:00]  SCr 4.74 [01-30 @ 06:00]    HBsAb 10.9      [01-28-23 @ 17:41]  HBsAg Nonreact      [01-28-23 @ 17:41]  HBcAb Reactive      [01-28-23 @ 17:41]  HCV 8.31, Reactive      [01-28-23 @ 17:41] [facility] Division of Kidney Diseases & Hypertension  FOLLOW UP NOTE  [phone]--------------------------------------------------------------------------------    Chief Complaint: YUNIEL on CKD/Ongoing hemodialysis requirement    24 hour events/subjective: Pt was seen and evaluated bedside today. Pt reports generalized weakness and diffuse body pains. Last HD treatment was on 2/2/23. No fever, CP or abdominal pain reported during rounds today.    PAST HISTORY  --------------------------------------------------------------------------------  No significant changes to PMH, PSH, FHx, SHx, unless otherwise noted    ALLERGIES & MEDICATIONS  --------------------------------------------------------------------------------  Allergies    Tylenol (Other)    Intolerances    Standing Inpatient Medications  albuterol/ipratropium for Nebulization. 3 milliLiter(s) Nebulizer once  cadexomer iodine 0.9% Gel 1 Application(s) Topical daily  calcitriol   Capsule 0.5 MICROGram(s) Oral daily  chlorhexidine 2% Cloths 1 Application(s) Topical two times a day  epoetin mejia-epbx (RETACRIT) Injectable 33424 Unit(s) SubCutaneous <User Schedule>  folic acid 1 milliGRAM(s) Oral daily  heparin  Infusion 1600 Unit(s)/Hr IV Continuous <Continuous>  metoclopramide 5 milliGRAM(s) Oral three times a day  multivitamin 1 Tablet(s) Oral daily  mupirocin 2% Ointment 1 Application(s) Topical two times a day  NIFEdipine XL 30 milliGRAM(s) Oral daily  pancrelipase  (CREON  6,000 Lipase Units) 1 Capsule(s) Oral three times a day with meals  pantoprazole    Tablet 40 milliGRAM(s) Oral every 12 hours  polyethylene glycol 3350 17 Gram(s) Oral two times a day  senna 2 Tablet(s) Oral at bedtime  sucralfate 1 Gram(s) Oral every 6 hours  thiamine 100 milliGRAM(s) Oral daily    PRN Inpatient Medications  bisacodyl 5 milliGRAM(s) Oral every 12 hours PRN  ondansetron Injectable 4 milliGRAM(s) IV Push every 6 hours PRN  oxyCODONE    IR 10 milliGRAM(s) Oral every 4 hours PRN  sodium chloride 0.9% lock flush 10 milliLiter(s) IV Push every 1 hour PRN  tiZANidine 2 milliGRAM(s) Oral every 8 hours PRN    REVIEW OF SYSTEMS  --------------------------------------------------------------------------------  Gen: See HPI, no fever  Respiratory: See HPI  CV: No chest pain  GI: No abdominal pain  MSK: No edema  Neuro: No dizziness    VITALS/PHYSICAL EXAM  --------------------------------------------------------------------------------  T(C): 36.8 (02-02-23 @ 10:52), Max: 37.2 (02-01-23 @ 21:35)  HR: 81 (02-02-23 @ 10:52) (81 - 100)  BP: 127/62 (02-02-23 @ 10:52) (127/62 - 141/72)  RR: 18 (02-02-23 @ 10:52) (17 - 18)  SpO2: 100% (02-02-23 @ 10:52) (100% - 100%)  Wt(kg): --    02-01-23 @ 07:01  -  02-02-23 @ 07:00  --------------------------------------------------------  IN: 392 mL / OUT: 800 mL / NET: -408 mL    02-02-23 @ 07:01  -  02-02-23 @ 15:40  --------------------------------------------------------  IN: 592 mL / OUT: 0 mL / NET: 592 mL    Physical Exam:    HEENT: no JVD  Pulm: CTA B/L  CV: S1S2+  Abd: Soft  Ext: Left foot dressing seen  Neuro: Awake and alert  Dialysis access: Left IJ non tunneled HD catheter+, DARYL AVF: + bruit heard    LABS/STUDIES  --------------------------------------------------------------------------------              8.3    8.46  >-----------<  231      [02-02-23 @ 06:04]              27.2     138  |  104  |  41  ----------------------------<  122      [02-02-23 @ 06:04]  3.7   |  22  |  3.70        Ca     7.4     [02-02-23 @ 06:04]      Mg     1.30     [02-02-23 @ 06:04]      Phos  1.8     [02-02-23 @ 06:04]    Creatinine Trend:  SCr 3.70 [02-02 @ 06:04]  SCr 2.83 [02-01 @ 06:00]  SCr 1.75 [01-31 @ 11:36]  SCr 3.22 [01-31 @ 07:00]  SCr 4.74 [01-30 @ 06:00]    HBsAb 10.9      [01-28-23 @ 17:41]  HBsAg Nonreact      [01-28-23 @ 17:41]  HBcAb Reactive      [01-28-23 @ 17:41]  HCV 8.31, Reactive      [01-28-23 @ 17:41]

## 2023-02-02 NOTE — PROGRESS NOTE ADULT - SUBJECTIVE AND OBJECTIVE BOX
[facility] Division of [facility] Medicine  Parish Nichols MD  Pager [phone]      Patient is a 62y old  Male who presents with a chief complaint of intractable N/V , esophageal stent removal (02 Feb 2023 07:29)      SUBJECTIVE / OVERNIGHT EVENTS:    no acute event o/n. wants to eat more. no new complaints    ADDITIONAL REVIEW OF SYSTEMS:    RESPIRATORY: No cough, wheezing, chills or hemoptysis; No shortness of breath  CARDIOVASCULAR: No chest pain, palpitations, dizziness, or leg swelling  GASTROINTESTINAL: No abdominal or epigastric pain. No nausea, vomiting, or hematemesis; No diarrhea or constipation. No melena or hematochezia.      MEDICATIONS  (STANDING):  albuterol/ipratropium for Nebulization. 3 milliLiter(s) Nebulizer once  cadexomer iodine 0.9% Gel 1 Application(s) Topical daily  calcitriol   Capsule 0.5 MICROGram(s) Oral daily  chlorhexidine 2% Cloths 1 Application(s) Topical two times a day  epoetin mejia-epbx (RETACRIT) Injectable 16910 Unit(s) SubCutaneous <User Schedule>  folic acid 1 milliGRAM(s) Oral daily  heparin  Infusion 1600 Unit(s)/Hr (16 mL/Hr) IV Continuous <Continuous>  metoclopramide 5 milliGRAM(s) Oral three times a day  multivitamin 1 Tablet(s) Oral daily  mupirocin 2% Ointment 1 Application(s) Topical two times a day  NIFEdipine XL 30 milliGRAM(s) Oral daily  pancrelipase  (CREON  6,000 Lipase Units) 1 Capsule(s) Oral three times a day with meals  pantoprazole    Tablet 40 milliGRAM(s) Oral every 12 hours  polyethylene glycol 3350 17 Gram(s) Oral two times a day  senna 2 Tablet(s) Oral at bedtime  sucralfate 1 Gram(s) Oral every 6 hours  thiamine 100 milliGRAM(s) Oral daily    MEDICATIONS  (PRN):  bisacodyl 5 milliGRAM(s) Oral every 12 hours PRN Constipation  ondansetron Injectable 4 milliGRAM(s) IV Push every 6 hours PRN Nausea and/or Vomiting  oxyCODONE    IR 10 milliGRAM(s) Oral every 4 hours PRN Moderate Pain (4 - 6)-Severe pain (7-10)  sodium chloride 0.9% lock flush 10 milliLiter(s) IV Push every 1 hour PRN Pre/post blood products, medications, blood draw, and to maintain line patency  tiZANidine 2 milliGRAM(s) Oral every 8 hours PRN Muscle Cramps      CAPILLARY BLOOD GLUCOSE        I&O's Summary    01 Feb 2023 07:01  -  02 Feb 2023 07:00  --------------------------------------------------------  IN: 392 mL / OUT: 800 mL / NET: -408 mL        PHYSICAL EXAM:  Vital Signs Last 24 Hrs  T(C): 36.8 (02 Feb 2023 10:52), Max: 37.2 (01 Feb 2023 21:35)  T(F): 98.2 (02 Feb 2023 10:52), Max: 99 (01 Feb 2023 21:35)  HR: 81 (02 Feb 2023 10:52) (81 - 100)  BP: 127/62 (02 Feb 2023 10:52) (127/62 - 141/72)  BP(mean): --  RR: 18 (02 Feb 2023 10:52) (17 - 18)  SpO2: 100% (02 Feb 2023 10:52) (100% - 100%)    Parameters below as of 02 Feb 2023 10:52  Patient On (Oxygen Delivery Method): room air      CONSTITUTIONAL: NAD,  EYES: PERRLA; conjunctiva and sclera clear  ENMT: Moist oral mucosa, no pharyngeal injection or exudates;   NECK: Supple, no palpable masses;  RESPIRATORY: Normal respiratory effort; basilar crackles bilaterally. [facility] triple lumen HD cath in place  CARDIOVASCULAR: Regular rate and rhythm, normal S1 and S2, no murmur/rub/gallop; No lower extremity edema; unable to palpate DT pulse b/l   ABDOMEN: Nontender to palpation, normoactive bowel sounds, no rebound/guarding;   MUSCLOSKELETAL:   no clubbing or cyanosis of digits; no joint swelling or tenderness to palpation  PSYCH: A+O to person, place  NEUROLOGY: CN 2-12 are intact and symmetric; no gross sensory deficits;   SKIN: L heel wound dressing c/d/i     LABS:                        8.3    8.46  )-----------( 231      ( 02 Feb 2023 06:04 )             27.2     02-02    138  |  104  |  41<H>  ----------------------------<  122<H>  3.7   |  22  |  3.70<H>    Ca    7.4<L>      02 Feb 2023 06:04  Phos  1.8     02-02  Mg     1.30     02-02      PT/INR - ( 02 Feb 2023 06:04 )   PT: 11.5 sec;   INR: 0.99 ratio         PTT - ( 02 Feb 2023 06:04 )  PTT:29.7 sec            RADIOLOGY & ADDITIONAL TESTS:  Results Reviewed:   Imaging Personally Reviewed:  Electrocardiogram Personally Reviewed:    COORDINATION OF CARE:  Care Discussed with Consultants/Other Providers [Y/N]:  Prior or Outpatient Records Reviewed [Y/N]:

## 2023-02-02 NOTE — PROGRESS NOTE ADULT - PROBLEM SELECTOR PLAN 1
Pt with YUNIEL on advanced CKD in the setting of recent COVID-19 and decreased oral intake. Pt was initiated on HD on 1/28/23 due to worsening kidney function with uremia. Documented urine output is 800 cc over the past 24 hours. Pt underwent LUE AVF creation on 1/5/23. Vascular surgery note from 2/2/23 reviewed, plan for possible pop-PT bypass tomorrow. Plan for HD treatment today to optimize prior to surgery tomorrow. Monitor labs and urine output. Avoid any potential nephrotoxins. Dose medications as per eGFR. Pt with YUNIEL on advanced CKD in the setting of recent COVID-19 and decreased oral intake. Pt was initiated on HD on 1/28/23 due to worsening kidney function with uremia. Documented urine output is 800 cc over the past 24 hours. Pt underwent LUE AVF creation on 1/5/23. Vascular surgery note from 2/2/23 reviewed, plan for possible LE bypass surgery tomorrow. Plan for HD treatment today to optimize prior to surgery tomorrow. Monitor labs and urine output. Avoid any potential nephrotoxins. Dose medications as per eGFR.

## 2023-02-03 NOTE — PROGRESS NOTE ADULT - SUBJECTIVE AND OBJECTIVE BOX
SURGERY  Pager: #[phone]    INTERVAL EVENTS/SUBJECTIVE: No acute events overnight. Patient planned for angio in OR today via pedal access. Seen and examined at bedside this AM.     ______________________________________________  OBJECTIVE:   T(C): 36.7 (02-03-23 @ 06:00), Max: 37.2 (02-02-23 @ 21:30)  HR: 96 (02-03-23 @ 06:00) (81 - 96)  BP: 138/75 (02-03-23 @ 06:00) (113/59 - 162/80)  RR: 16 (02-03-23 @ 06:00) (16 - 20)  SpO2: 98% (02-03-23 @ 06:00) (98% - 100%)  Wt(kg): --  CAPILLARY BLOOD GLUCOSE        I&O's Detail    02 Feb 2023 07:01  -  03 Feb 2023 07:00  --------------------------------------------------------  IN:    Heparin: 192 mL    Oral Fluid: 720 mL    Other (mL): 400 mL  Total IN: 1312 mL    OUT:    Other (mL): 1400 mL  Total OUT: 1400 mL    Total NET: -88 mL            Physical Exam:  Gen: resting in bed comfortably in NAD  Chest: no increased WOB, regular inspiratory effort   Abdomen: Soft, nontender, nondistended with no rebound tenderness or guarding.   Vascular: bilateral DP/PT signals, sensation/motor intact in bilateral LE. Wound on bottom of left foot stable. Palpable over AVF   Neuro: awake, alert  ______________________________________________  LABS:  CBC Full  -  ( 03 Feb 2023 02:17 )  WBC Count : 7.51 K/uL  RBC Count : 2.95 M/uL  Hemoglobin : 8.7 g/dL  Hematocrit : 28.6 %  Platelet Count - Automated : 223 K/uL  Mean Cell Volume : 96.9 fL  Mean Cell Hemoglobin : 29.5 pg  Mean Cell Hemoglobin Concentration : 30.4 gm/dL  Auto Neutrophil # : x  Auto Lymphocyte # : x  Auto Monocyte # : x  Auto Eosinophil # : x  Auto Basophil # : x  Auto Neutrophil % : x  Auto Lymphocyte % : x  Auto Monocyte % : x  Auto Eosinophil % : x  Auto Basophil % : x    02-03    138  |  102  |  22  ----------------------------<  127<H>  3.8   |  26  |  2.18<H>    Ca    7.7<L>      03 Feb 2023 02:17  Phos  1.4     02-03  Mg     1.40     02-03      _____________________________________________  RADIOLOGY:

## 2023-02-03 NOTE — PRE-OP CHECKLIST - 4.
pt has a foam dsg to the right lower back across from right hip, left AVF in place, not being used pt has a foam dsg to the right lower back across from right hip, left AVF in place, not being used, pt has necrotic, toes on B/L feet, peeling skin on B/L feet and B/L DTIs on both feet

## 2023-02-03 NOTE — PROGRESS NOTE ADULT - ASSESSMENT
62M EtoH misuse c/b chronic pancreatitis, HCV s/p treatment (SVR), emphysema, benign esophageal stricture (s/p stent on 4/2022) admitted at [facility] w/ septic shock septic shock due to pseudomonal PNA, lung abscess, Morganella bacteremia and Klebsiella UTI (now s/p 6 wks zosyn ended 1/6) w/ hospital course c/b YUNIEL on CKD requiring HD and MICU stay, transferred here for intractable nausea/vomiting and esophageal stent removal, now s/p removal w/ EGD sig for esophagitis/gastric ulcer. Hospital course now c/b acute on chronic anemia required multiple transfusion. Now patient is noted to have skin breakdown in Right toes concerning for underline infection secondary to vascular insufficiency. Plan for repeat angiogram, and potential repeat  pop-PT bypass surgery. Course c.b by Right UE DVT, limiting IV access.

## 2023-02-03 NOTE — PROGRESS NOTE ADULT - PROBLEM SELECTOR PLAN 9
Septic shock due to pseudomonal PNA with lung abscess, morganella bacteremia and kelbsiella UTI.  Was on Zosyn since 11/25, [facility] recommended 4 week course of antibiotics versus possibly longer pending imaging at 4 weeks (12/23)  - CXR 12/23 - Stable RUL lesion, with new RLL pneumonia   - seen by ID  - completed 6 wks zosyn 1/6  - per d/w ID no further imaging as 6 wks abx sufficient treatment

## 2023-02-03 NOTE — PROGRESS NOTE ADULT - ASSESSMENT
62M hx esophageal stricture, chronic pancreatitis, originally presented with n/v, esophageal stent removal in December, course c/b septic shock. Patient s/p first stage left brachiobasilic AVF on 1/5 with Dr. Oro. Vascular surgery reconsulted in setting of bilateral foot wounds and reported nonpalpable pulses. S/p RLE angio on 1/20 with PT plasty, now planning for RLE angio in OR with pedal access 2/3.     PLAN:  - Vein mapping completed, reviewed - will need repeat angiogram in OR with pedal access given small veins on duplex   - Fistula evaluated, no current intervention   - OR today     C Team Surgery  #[id number]

## 2023-02-03 NOTE — PROGRESS NOTE ADULT - PROBLEM SELECTOR PLAN 3
- s/p hep gtt  - plan to transition to Apixaban if no further surgical intervention planned as per vascular/podiatry   - trend cbcs

## 2023-02-03 NOTE — PROGRESS NOTE ADULT - SUBJECTIVE AND OBJECTIVE BOX
[facility] Division of Hospital Medicine  Parish Nichols MD  Pager [phone]      Patient is a 62y old  Male who presents with a chief complaint of intractable N/V , esophageal stent removal (03 Feb 2023 09:30)      SUBJECTIVE / OVERNIGHT EVENTS:    no acute event o/n. pt scheduled for angiogram in OR today    ADDITIONAL REVIEW OF SYSTEMS:    RESPIRATORY: No cough, wheezing, chills or hemoptysis; No shortness of breath  CARDIOVASCULAR: No chest pain, palpitations, dizziness, or leg swelling  GASTROINTESTINAL: No abdominal or epigastric pain. No nausea, vomiting, or hematemesis; No diarrhea or constipation. No melena or hematochezia.    MEDICATIONS  (STANDING):  albuterol/ipratropium for Nebulization. 3 milliLiter(s) Nebulizer once  cadexomer iodine 0.9% Gel 1 Application(s) Topical daily  calcitriol   Capsule 0.5 MICROGram(s) Oral daily  chlorhexidine 2% Cloths 1 Application(s) Topical two times a day  epoetin mejia-epbx (RETACRIT) Injectable 80605 Unit(s) SubCutaneous <User Schedule>  folic acid 1 milliGRAM(s) Oral daily  metoclopramide 5 milliGRAM(s) Oral three times a day  multivitamin 1 Tablet(s) Oral daily  mupirocin 2% Ointment 1 Application(s) Topical two times a day  NIFEdipine XL 30 milliGRAM(s) Oral daily  pancrelipase  (CREON  6,000 Lipase Units) 1 Capsule(s) Oral three times a day with meals  pantoprazole    Tablet 40 milliGRAM(s) Oral every 12 hours  polyethylene glycol 3350 17 Gram(s) Oral two times a day  senna 2 Tablet(s) Oral at bedtime  sucralfate 1 Gram(s) Oral every 6 hours  thiamine 100 milliGRAM(s) Oral daily    MEDICATIONS  (PRN):  bisacodyl 5 milliGRAM(s) Oral every 12 hours PRN Constipation  ondansetron Injectable 4 milliGRAM(s) IV Push every 6 hours PRN Nausea and/or Vomiting  sodium chloride 0.9% lock flush 10 milliLiter(s) IV Push every 1 hour PRN Pre/post blood products, medications, blood draw, and to maintain line patency  tiZANidine 2 milliGRAM(s) Oral every 8 hours PRN Muscle Cramps      CAPILLARY BLOOD GLUCOSE        I&O's Summary    02 Feb 2023 07:01  -  03 Feb 2023 07:00  --------------------------------------------------------  IN: 1312 mL / OUT: 1400 mL / NET: -88 mL    03 Feb 2023 07:01  -  03 Feb 2023 13:47  --------------------------------------------------------  IN: 0 mL / OUT: 0 mL / NET: 0 mL        PHYSICAL EXAM:  Vital Signs Last 24 Hrs  T(C): 36.8 (03 Feb 2023 09:11), Max: 37.2 (02 Feb 2023 21:30)  T(F): 98.2 (03 Feb 2023 09:11), Max: 98.9 (02 Feb 2023 21:30)  HR: 89 (03 Feb 2023 09:11) (83 - 96)  BP: 130/51 (03 Feb 2023 09:11) (111/53 - 162/80)  BP(mean): --  RR: 18 (03 Feb 2023 09:11) (16 - 20)  SpO2: 100% (03 Feb 2023 09:11) (98% - 100%)    Parameters below as of 03 Feb 2023 09:11  Patient On (Oxygen Delivery Method): room air        CONSTITUTIONAL: NAD,  EYES: PERRLA; conjunctiva and sclera clear  ENMT: Moist oral mucosa, no pharyngeal injection or exudates;   NECK: Supple, no palpable masses;  RESPIRATORY: Normal respiratory effort; basilar crackles bilaterally. [facility] triple lumen HD cath in place  CARDIOVASCULAR: Regular rate and rhythm, normal S1 and S2, no murmur/rub/gallop; No lower extremity edema; unable to palpate DT pulse b/l   ABDOMEN: Nontender to palpation, normoactive bowel sounds, no rebound/guarding;   MUSCLOSKELETAL:   no clubbing or cyanosis of digits; no joint swelling or tenderness to palpation  PSYCH: A+O to person, place  NEUROLOGY: CN 2-12 are intact and symmetric; no gross sensory deficits;   SKIN: L heel wound dressing c/d/i     LABS:                        8.7    7.51  )-----------( 223      ( 03 Feb 2023 02:17 )             28.6     02-03    138  |  102  |  22  ----------------------------<  127<H>  3.8   |  26  |  2.18<H>    Ca    7.7<L>      03 Feb 2023 02:17  Phos  1.4     02-03  Mg     1.40     02-03      PT/INR - ( 03 Feb 2023 02:17 )   PT: 12.0 sec;   INR: 1.03 ratio         PTT - ( 03 Feb 2023 02:17 )  PTT:59.8 sec            RADIOLOGY & ADDITIONAL TESTS:  Results Reviewed:   Imaging Personally Reviewed:  Electrocardiogram Personally Reviewed:    COORDINATION OF CARE:  Care Discussed with Consultants/Other Providers [Y/N]:  Prior or Outpatient Records Reviewed [Y/N]:

## 2023-02-03 NOTE — PROGRESS NOTE ADULT - PROBLEM SELECTOR PLAN 1
b/l heel wounds and right 2nd and 3rd toe wound.   - Xray foot negative for signs of OM  -MR foot pending   -podiatry following- concern for poor wound healing in the setting PAD- rec local wound care until revascularization   - BARRY/PVR result reviewed; mild stenosis in LLE, moderate stenosis in RLE.   - S/p RLE angio by vascular on 1/20 with PT plasty. now s/p repeat angioplasty 2/3  - f/u vascular and podiatry post op recs

## 2023-02-03 NOTE — DISCHARGE NOTE PROVIDER - NSDCFUADDAPPT_GEN_ALL_CORE_FT
MARC LAIRD   [facility]      PATIENT NAME: Dayanara Monae   : 1948    AGE: 74 y.o. DATE: 2023   MRN: [id number]        Reason for Visit / Chief Complaint: Medicare AWV (Subsequent Medicare AWV )        Dayanara Monae presents for a Subsequent Medicare AWV today.     The following components were reviewed and updated:    Medical/Social/Family History:  Past Medical History:   Diagnosis Date    Actinic keratoses     Atrial fibrillation     CVA (cerebral vascular accident)     Epilepsy     Non-melanoma skin cancer     Osteoporosis     Skin cancer         Family History   Problem Relation Age of Onset    Hypertension Mother     Stroke Father     Diabetes Sister         Social History     Tobacco Use   Smoking Status Never    Passive exposure: Never   Smokeless Tobacco Never      Social History     Substance and Sexual Activity   Alcohol Use Never       Family History   Problem Relation Age of Onset    Hypertension Mother     Stroke Father     Diabetes Sister        Past Surgical History:   Procedure Laterality Date    APPENDECTOMY      carpel tunnel Right     CHOLECYSTECTOMY      EYE SURGERY      HAND SURGERY      HYSTERECTOMY      JOINT REPLACEMENT           Allergies and Current Medications     Review of patient's allergies indicates:   Allergen Reactions    Vaseline carbolated petroleum Blisters    Adhesive tape-silicones     Bactrim [sulfamethoxazole-trimethoprim]        Current Outpatient Medications:     acitretin (SORIATANE) 25 MG capsule, Take one 25mg capsule PO daily for Psoriasis, Disp: 90 capsule, Rfl: 1    albuterol (VENTOLIN HFA) 90 mcg/actuation inhaler, Inhale 2 puffs into the lungs every 6 (six) hours as needed for Wheezing. Or COUGH, Disp: 18 g, Rfl: 5    aspirin (ECOTRIN) 81 MG EC tablet, Take 81 mg by mouth once daily., Disp: , Rfl:     calcium carb-mag hydrox-simeth 1,200 mg-270 mg -80 mg/10 mL Susp, 1 tablet., Disp: , Rfl:     calcium carbonate (OS-FATOU)  600 mg calcium (1,500 mg) Tab, Take 600 mg by mouth., Disp: , Rfl:     cyanocobalamin (VITAMIN B-12) 1000 MCG tablet, Take 1,000 mcg by mouth., Disp: , Rfl:     fluticasone propionate (FLONASE) 50 mcg/actuation nasal spray, 1 spray (50 mcg total) by Each Nostril route once daily., Disp: 16 g, Rfl: 5    ketoconazole (NIZORAL) 2 % cream, Apply topically once daily., Disp: 60 g, Rfl: 5    metoprolol tartrate (LOPRESSOR) 25 MG tablet, Take 1 tablet (25 mg total) by mouth 2 (two) times daily. For HEART, Disp: 180 tablet, Rfl: 1    omeprazole (PRILOSEC) 40 MG capsule, Take 1 capsule (40 mg total) by mouth once daily. For STOMACH, Disp: 90 capsule, Rfl: 1    potassium chloride (K-TAB) 20 mEq, Take 1 tablet (20 mEq total) by mouth once daily., Disp: 90 tablet, Rfl: 1    travoprost (TRAVATAN Z) 0.004 % ophthalmic solution, Place 1 drop into both eyes every evening., Disp: , Rfl:     chlorhexidine (PERIDEX) 0.12 % solution, Use as directed 15 mLs in the mouth or throat 2 (two) times a day., Disp: , Rfl:     citalopram (CELEXA) 20 MG tablet, Take 1 tablet (20 mg total) by mouth once daily. For MOOD, Disp: 90 tablet, Rfl: 1    clindamycin (CLEOCIN) 300 MG capsule, Take 300 mg by mouth 3 (three) times daily., Disp: , Rfl:     donepeziL (ARICEPT) 5 MG tablet, Take 1 tablet (5 mg total) by mouth once daily. For MEMORY, Disp: 90 tablet, Rfl: 1    DULoxetine (CYMBALTA) 30 MG capsule, TAKE 1 CAPSULE BY MOUTH ONCE DAILY FOR  JOINT  PAIN, Disp: 90 capsule, Rfl: 1    hydroCHLOROthiazide (HYDRODIURIL) 25 MG tablet, Take 1 tablet (25 mg total) by mouth once daily. For BLOOD PRESSURE, Disp: 90 tablet, Rfl: 1    leucovorin (WELLCOVORIN) 25 MG Tab, Take 25 mg by mouth., Disp: , Rfl:     loratadine (CLARITIN) 10 mg tablet, Take 1 tablet (10 mg total) by mouth once daily. For ALLERGIES (Patient not taking: Reported on 2/3/2023), Disp: 90 tablet, Rfl: 1    metFORMIN (GLUCOPHAGE-XR) 500 MG ER 24hr tablet, Take 1 tablet (500 mg total) by mouth 2  (two) times daily with meals. For diabetes, Disp: 180 tablet, Rfl: 1    moxifloxacin (VIGAMOX) 0.5 % ophthalmic solution, , Disp: , Rfl:     rosuvastatin (CRESTOR) 20 MG tablet, Take 1 tablet (20 mg total) by mouth every evening. For CHOLESTEROL, Disp: 90 tablet, Rfl: 1    Health Risk Assessment   Fall Risk: No   Advance Directive:  Does not have an advanced directive. Verbal education and written education included in today's AVS.   Depression: PHQ9 score: 5    HTN: DASH diet, exercise, weight management, med compliance, home BP monitoring, and follow-up discussed.   T2DM: diabetic diet, glucose monitoring, activity level, weight management, med compliance, and follow-up discussed.   Tobacco use: No  STI: No    Alcohol misuse: No   Statin Use: Yes    Opioid Risk Score         Value Time User    Opioid Risk Score  0 2/3/2023  3:05 PM Mary Mehta RN               Health Risk Assessment  What [age] 70-79  Are you male or female?: Female  During the past four weeks, how much have you been bothered by emotional problems such as feeling anxious, depressed, irritable, sad, or downhearted and blue?: Slightly  During the past five weeks, has your physical and/or emotional health limited your social activities with family, friends, neighbors, or groups?: Not at all  During the past four weeks, how much bodily pain have you generally had?: Mild pain  During the past four weeks, was someone available to help if you needed and wanted help?: Yes, as much as I wanted  During the past four weeks, what was the hardest physical activity you could do for at least two minutes?: Moderate  Can you get to places out of walking distance without help?  (For example, can you travel alone on buses or taxis, or drive your own car?): Yes  Can you go shopping for groceries or clothes without someone's help?: Yes  Can you prepare your own meals?: Yes  Can you do your own housework without help?: Yes  Because of any health problems,  do you need the help of another person with your personal care needs such as eating, bathing, dressing, or getting around the house?: No  Can you handle your own money without help?: Yes  During the past four weeks, how would you rate your health in general?: Good  How have things been going for you during the past four weeks?: Pretty well  Are you having difficulties driving your car?: No  Do you always fasten your seat belt when you are in a car?: Yes, usually  How often in the past four weeks have you been bothered by falling or dizzy when standing up?: Never  How often in the past four weeks have you been bothered by sexual problems?: Never  How often in the past four weeks have you been bothered by trouble eating well?: Never  How often in the past four weeks have you been bothered by teeth or denture problems?: Seldom  How often in the past four weeks have you been bothered with problems using the telephone?: Never  How often in the past four weeks have you been bothered by tiredness or fatigue?: Sometimes  Have you fallen two or more times in the past year?: No  Are you afraid of falling?: Yes  Are you a smoker?: No  During the past four weeks, how many drinks of wine, beer, or other alcoholic beverages did you have?: No alcohol at all  Do you exercise for about 20 minutes three or more days a week?: No, I usually do not exercise this much  Have you been given any information to help you with hazards in your house that might hurt you?: Yes  Have you been given any information to help you with keeping track of your medications?: Yes  How often do you have trouble taking medicines the way you've been told to take them?: I always take them as prescribed  How confident are you that you can control and manage most of your health problems?: Very confident  What is your race? (Check all that apply.):     Health Maintenance       Health Maintenance Topics with due status: Not Due       Topic Last Completion  Date    TETANUS VACCINE 05/19/2015    Colorectal Cancer Screening 02/04/2020    Foot Exam 04/25/2022    DEXA Scan 04/29/2022    Eye Exam 06/16/2022    Diabetes Urine Screening 09/19/2022    Hemoglobin A1c 12/28/2022    Lipid Panel 01/05/2023    Low Dose Statin 02/03/2023     There are no preventive care reminders to display for this patient.    Incontinence  Bowel: Yes  Bladder: Yes    Lab results available in Epic or see dates from [facility] above:   Lab Results   Component Value Date    CHOL 134 01/05/2023    CHOL 124 06/30/2022    CHOL 144 03/25/2022     Lab Results   Component Value Date    HDL 37 (L) 01/05/2023    HDL 48 06/30/2022    HDL 51 03/25/2022     Lab Results   Component Value Date    LDLCALC 61 01/05/2023    LDLCALC 50 06/30/2022    LDLCALC 72 03/25/2022     Lab Results   Component Value Date    TRIG 182 (H) 01/05/2023    TRIG 130 06/30/2022    TRIG 107 03/25/2022     Lab Results   Component Value Date    CHOLHDL 3.6 01/05/2023    CHOLHDL 2.6 06/30/2022    CHOLHDL 2.8 03/25/2022       Lab Results   Component Value Date    HGBA1C 8.3 (H) 12/28/2022       Sodium   Date Value Ref Range Status   12/27/2022 137 136 - 145 mmol/L Final     Potassium   Date Value Ref Range Status   12/27/2022 4.4 3.5 - 5.1 mmol/L Final     Chloride   Date Value Ref Range Status   12/27/2022 103 98 - 107 mmol/L Final     CO2   Date Value Ref Range Status   12/27/2022 28 21 - 32 mmol/L Final     Glucose   Date Value Ref Range Status   12/27/2022 215 (H) 74 - 106 mg/dL Final     BUN   Date Value Ref Range Status   12/27/2022 14 7 - 18 mg/dL Final     Creatinine   Date Value Ref Range Status   12/27/2022 0.97 0.55 - 1.02 mg/dL Final     Calcium   Date Value Ref Range Status   12/27/2022 9.8 8.5 - 10.1 mg/dL Final     Total Protein   Date Value Ref Range Status   01/05/2023 7.4 6.4 - 8.2 g/dL Final     Albumin   Date Value Ref Range Status   01/05/2023 3.3 (L) 3.5 - 5.0 g/dL Final     Bilirubin, Total   Date Value Ref Range Status  "  01/05/2023 0.2 >0.0 - 1.2 mg/dL Final     Alk Phos   Date Value Ref Range Status   01/05/2023 60 55 - 142 U/L Final     AST   Date Value Ref Range Status   01/05/2023 21 15 - 37 U/L Final     ALT   Date Value Ref Range Status   01/05/2023 26 13 - 56 U/L Final     Anion Gap   Date Value Ref Range Status   12/27/2022 10 7 - 16 mmol/L Final     eGFR    Date Value Ref Range Status   04/06/2021 76       Comment:     The above 20 analytes were performed by [id number] [place] [address],[place] [id number]     eGFR   Date Value Ref Range Status   06/30/2022 69 >=60 mL/min/1.73m² Final             Care Team   PCP:     Eye specialist:   Neurologist:    Derm:        **See Completed Assessments for Annual Wellness visit within the encounter summary    The following assessments were completed & reviewed:  Depression Screening  Cognitive function Screening  Timed Get Up Test  Whisper Test  Vision Screen  Health Risk Assessment  Checklist of ADLs and IADLs      Objective  Vitals:    02/03/23 1442   BP: 110/60   Pulse: (!) 55   Resp: 16   Temp: 97.8 °F (36.6 °C)   TempSrc: Oral   SpO2: 98%   Weight: 78.9 kg (174 lb)   Height: 4' 11" (1.499 m)   PainSc: 0-No pain      Body mass index is 35.14 kg/m².  Ideal body weight: 48.8 kg (107 lb 8.4 oz)       Physical Exam  Vitals and nursing note reviewed.   Constitutional:       General: She is not in acute distress.     Appearance: Normal appearance. She is not ill-appearing.   Eyes:      Extraocular Movements: Extraocular movements intact.      Pupils: Pupils are equal, round, and reactive to light.   Cardiovascular:      Rate and Rhythm: Normal rate and regular rhythm.      Heart sounds: Normal heart sounds.   Pulmonary:      Effort: Pulmonary effort is normal.      Breath sounds: Normal breath sounds.   Abdominal:      General: Bowel sounds are normal.      Palpations: Abdomen is soft.   Musculoskeletal:         General: Normal " range of motion.   Skin:     Findings: No rash.   Neurological:      General: No focal deficit present.      Mental Status: She is alert and oriented to person, place, and time. Mental status is at baseline.   Psychiatric:         Mood and Affect: Mood normal.         Behavior: Behavior normal.         Assessment:     1. Encounter for subsequent annual wellness visit (AWV) in Medicare patient    2. BMI 35.0-35.9,adult    3. Mixed hyperlipidemia  - rosuvastatin (CRESTOR) 20 MG tablet; Take 1 tablet (20 mg total) by mouth every evening. For CHOLESTEROL  Dispense: 90 tablet; Refill: 1    4. Essential hypertension  - hydroCHLOROthiazide (HYDRODIURIL) 25 MG tablet; Take 1 tablet (25 mg total) by mouth once daily. For BLOOD PRESSURE  Dispense: 90 tablet; Refill: 1    5. Type 2 diabetes mellitus without complication, without long-term current use of insulin  - metFORMIN (GLUCOPHAGE-XR) 500 MG ER 24hr tablet; Take 1 tablet (500 mg total) by mouth 2 (two) times daily with meals. For diabetes  Dispense: 180 tablet; Refill: 1    6. Neck pain  - DULoxetine (CYMBALTA) 30 MG capsule; TAKE 1 CAPSULE BY MOUTH ONCE DAILY FOR  JOINT  PAIN  Dispense: 90 capsule; Refill: 1    7. Dementia with behavioral disturbance  - donepeziL (ARICEPT) 5 MG tablet; Take 1 tablet (5 mg total) by mouth once daily. For MEMORY  Dispense: 90 tablet; Refill: 1    8. Bilateral carpal tunnel syndrome    9. Migraine with aura and without status migrainosus, not intractable    10. Neuropathy    11. Worsening headaches    12. Non-seasonal allergic rhinitis, unspecified trigger    13. Sensorineural hearing loss (SNHL) of right ear with restricted hearing of left ear    14. Actinic keratoses    15. Plaque psoriasis    16. Chronic cough    17. B12 deficiency    18. Gastroesophageal reflux disease without esophagitis    19. Periumbilical hernia    20. Age-related osteoporosis without current pathological fracture    21. Class 2 severe obesity due to excess calories  with serious comorbidity and body mass index (BMI) of 35.0 to 35.9 in adult    22. Recurrent major depressive disorder, in partial remission  - citalopram (CELEXA) 20 MG tablet; Take 1 tablet (20 mg total) by mouth once daily. For MOOD  Dispense: 90 tablet; Refill: 1    23. Aortic atherosclerosis         Plan:    Referrals:        Advised to call office if does not hear from anyone with referral appt within 2-3 weeks to check on status of referral. Voiced understanding.      Discussed and provided with a screening schedule and personal prevention plan in accordance with USPSTF age appropriate recommendations and Medicare screening guidelines.   Education, counseling, and referrals were provided as needed.  After Visit Summary printed and given to patient which includes written education and a list of any referrals if indicated.     Education including advanced directives, diet, exercise, falls, and preventive health discussed with patient and patient verbalized understanding.      F/u plan for yearly AWV.    Signature:  Salty Barahona MD     1. Please follow up with your PCP (5/18 1:20 PM)  2. Please be sure to follow up with your nephrologist. You were scheduled for an appointment but were admitted to the hospital.  3. Please follow up with GI (5/12 at 9 am) 1. Please follow up with your PCP (5/18 at 1:20 PM)  2. Please be sure to follow up with your nephrologist. You were scheduled for an appointment but were admitted to the hospital.  3. Please follow up with GI (5/12 at 9 am)

## 2023-02-03 NOTE — CHART NOTE - NSCHARTNOTEFT_GEN_A_CORE
Vital Signs Last 24 Hrs  T(C): 36.8 (03 Feb 2023 18:12), Max: 37.2 (02 Feb 2023 21:30)  T(F): 98.3 (03 Feb 2023 18:12), Max: 98.9 (02 Feb 2023 21:30)  HR: 87 (03 Feb 2023 18:12) (70 - 96)  BP: 147/76 (03 Feb 2023 18:12) (95/50 - 162/80)  BP(mean): 72 (03 Feb 2023 14:45) (60 - 72)  RR: 17 (03 Feb 2023 18:12) (12 - 20)  SpO2: 100% (03 Feb 2023 18:12) (96% - 100%)    Parameters below as of 03 Feb 2023 18:12  Patient On (Oxygen Delivery Method): room air    Pt seen at bedside by provider. Pt was sleeping after L-leg angioplasty. The  Tegaderm access site was intact but guaze was soaked in red tinged fluid. Pt advocated to mild pain to palpation but was nontender to palpation and pt did not appear grossly in distress or pain. Pulses intact distal to access site and no swelling or inflammation was noted around access site. Will continue to monitor overnight.     Houston Rose ACP

## 2023-02-03 NOTE — CHART NOTE - NSCHARTNOTEFT_GEN_A_CORE
POST-OP NOTE    IMELDA ROBERTSON | [id number] | [facility] [address]    Procedure: s/p R AT angioplasty via pedal access    Subjective: Pt at bedside eating and feeling well. Denies SOB/CP, n/v w/ pain under control.     Vital Signs Last 24 Hrs  T(C): 36.8 (03 Feb 2023 18:12), Max: 37.2 (02 Feb 2023 21:30)  T(F): 98.3 (03 Feb 2023 18:12), Max: 98.9 (02 Feb 2023 21:30)  HR: 87 (03 Feb 2023 18:12) (70 - 96)  BP: 147/76 (03 Feb 2023 18:12) (95/50 - 162/80)  BP(mean): 72 (03 Feb 2023 14:45) (60 - 72)  RR: 17 (03 Feb 2023 18:12) (12 - 20)  SpO2: 100% (03 Feb 2023 18:12) (96% - 100%)    Parameters below as of 03 Feb 2023 18:12  Patient On (Oxygen Delivery Method): room air      I&O's Summary    02 Feb 2023 07:01  -  03 Feb 2023 07:00  --------------------------------------------------------  IN: 1312 mL / OUT: 1400 mL / NET: -88 mL    03 Feb 2023 07:01  -  03 Feb 2023 18:16  --------------------------------------------------------  IN: 300 mL / OUT: 200 mL / NET: 100 mL                            8.7    7.51  )-----------( 223      ( 03 Feb 2023 02:17 )             28.6     02-03    138  |  102  |  22  ----------------------------<  127<H>  3.8   |  26  |  2.18<H>    Ca    7.7<L>      03 Feb 2023 02:17  Phos  1.4     02-03  Mg     1.40     02-03     PT/INR - ( 03 Feb 2023 02:17 )   PT: 12.0 sec;   INR: 1.03 ratio         PTT - ( 03 Feb 2023 02:17 )  PTT:59.8 sec    PHYSICAL EXAM:  Gen: NAD  Resp: breathing easily, no stridor  Abdomen: soft, nontender, nondistended  Skin: Normal color, no rashes or lesions  Extremities:  RLE distal foot w/ Kerlix wrapping; Tegaderm dressing w/ s/s output; DP/PT signal  LLE +PT/DP signal    62M hx esophageal stricture, chronic pancreatitis, originally presented with n/v, esophageal stent removal in December, course c/b septic shock. Patient s/p first stage left brachiobasilic AVF on 1/5 with Dr. Oro. Vascular surgery reconsulted in setting of bilateral foot wounds and reported nonpalpable pulses. S/p RLE angio on 1/20 with PT plasty, now planning for RLE angio in OR with pedal access 2/3    PLAN:  - Diet, regular   - Pain ATC  - Vein mapping completed, reviewed - will need repeat angiogram in OR with pedal access given small veins on duplex   - Fistula evaluated, no current intervention   - AM labs    C Team Surgery  #[id number]

## 2023-02-04 NOTE — CHART NOTE - NSCHARTNOTEFT_GEN_A_CORE
Notified by day provider to recheck right lateral access site for further bleeding.     Access site assessed. Dressing intact pinkish in color consistent w/ description on sign out. Lower extremity warm well perfused Access site examined no active bleeding hematoma purulent discharge erythema TTP or swelling. Site cleaned dried and redressed w/ sterile gauze and tegaderm at approx 11 pm 2/3.    -Recheck in AM for further bleeding.     Margarito Lea PA-C  Department of Internal Medicine  In-House beeper number [phone]

## 2023-02-04 NOTE — PROGRESS NOTE ADULT - SUBJECTIVE AND OBJECTIVE BOX
seen by vascular   'PLAN:  - pedal access site clean dry, no hematoma  - Fistula evaluated, no current intervention   - no vascular interventions at this time   - ok to restart heparin gtt "     First time meeting this patient   Patient is a 62y old  Male who presents with a chief complaint of intractable N/V , esophageal stent removal (04 Feb 2023 14:16)      SUBJECTIVE / OVERNIGHT EVENTS:  Patient wants extra servings of food- Rn asked please to help    MEDICATIONS  (STANDING):  albuterol/ipratropium for Nebulization. 3 milliLiter(s) Nebulizer once  cadexomer iodine 0.9% Gel 1 Application(s) Topical daily  calcitriol   Capsule 0.5 MICROGram(s) Oral daily  chlorhexidine 2% Cloths 1 Application(s) Topical two times a day  epoetin mejia-epbx (RETACRIT) Injectable 72223 Unit(s) SubCutaneous <User Schedule>  folic acid 1 milliGRAM(s) Oral daily  metoclopramide 5 milliGRAM(s) Oral three times a day  multivitamin 1 Tablet(s) Oral daily  mupirocin 2% Ointment 1 Application(s) Topical two times a day  NIFEdipine XL 30 milliGRAM(s) Oral daily  pancrelipase  (CREON  6,000 Lipase Units) 1 Capsule(s) Oral three times a day with meals  pantoprazole    Tablet 40 milliGRAM(s) Oral every 12 hours  polyethylene glycol 3350 17 Gram(s) Oral two times a day  senna 2 Tablet(s) Oral at bedtime  sucralfate 1 Gram(s) Oral every 6 hours  thiamine 100 milliGRAM(s) Oral daily    MEDICATIONS  (PRN):  bisacodyl 5 milliGRAM(s) Oral every 12 hours PRN Constipation  ondansetron Injectable 4 milliGRAM(s) IV Push every 6 hours PRN Nausea and/or Vomiting  sodium chloride 0.9% lock flush 10 milliLiter(s) IV Push every 1 hour PRN Pre/post blood products, medications, blood draw, and to maintain line patency  tiZANidine 2 milliGRAM(s) Oral every 8 hours PRN Muscle Cramps    I&O's Summary    03 Feb 2023 07:01  -  04 Feb 2023 07:00  --------------------------------------------------------  IN: 300 mL / OUT: 380 mL / NET: -80 mL    04 Feb 2023 07:01  -  04 Feb 2023 14:24  --------------------------------------------------------  IN: 240 mL / OUT: 0 mL / NET: 240 mL    Vital Signs Last 24 Hrs  T(C): 36.7 (04 Feb 2023 10:14), Max: 37.3 (04 Feb 2023 05:50)  T(F): 98 (04 Feb 2023 10:14), Max: 99.2 (04 Feb 2023 05:50)  HR: 97 (04 Feb 2023 10:14) (70 - 97)  BP: 140/72 (04 Feb 2023 10:14) (95/50 - 147/76)  BP(mean): 72 (03 Feb 2023 14:45) (60 - 72)  RR: 18 (04 Feb 2023 10:14) (12 - 18)  SpO2: 100% (04 Feb 2023 10:14) (96% - 100%)    Parameters below as of 04 Feb 2023 10:14  Patient On (Oxygen Delivery Method): room air        PHYSICAL EXAM:  L neck acces line for HD  GENERAL: NAD, well-developed  HEAD:  Atraumatic, Normocephalic  EYES: EOMI, PERRLA, conjunctiva and sclera clear  NECK: Supple, No JVD  CHEST/LUNG: Clear to auscultation bilaterally; No wheeze  HEART: Regular rate and rhythm; No murmurs, rubs, or gallops  ABDOMEN: Soft, Nontender, Nondistended; Bowel sounds present  EXTREMITIES:  2+ Peripheral Pulses, No clubbing, cyanosis, or edema  PSYCH: Alert and still hungry  NEUROLOGY: non-focal      LABS:                        9.2    7.29  )-----------( 153      ( 04 Feb 2023 07:20 )             30.8     02-04    138  |  101  |  37<H>  ----------------------------<  73  3.8   |  25  |  3.06<H>    Ca    8.0<L>      04 Feb 2023 07:20  Phos  2.6     02-04  Mg     1.40     02-04      PT/INR - ( 04 Feb 2023 13:25 )   PT: 12.2 sec;   INR: 1.05 ratio         PTT - ( 04 Feb 2023 13:25 )  PTT:24.5 sec        Care Discussed with Consultants/Other Providers:  seen by vascular   'PLAN:  - pedal access site clean dry, no hematoma  - Fistula evaluated, no current intervention   - no vascular interventions at this time   - ok to restart heparin gtt "

## 2023-02-04 NOTE — PROGRESS NOTE ADULT - NSPROGADDITIONALINFOA_GEN_ALL_CORE
will do heparin drip no bolus.  keep ptt at 60 and mon cbc q12 to keep hgb >7 will do heparin drip no bolus.  keep ptt at 60 and mon cbc q8 to keep hgb >7  plan d/w myron Oconnell

## 2023-02-04 NOTE — PROGRESS NOTE ADULT - ASSESSMENT
62M hx esophageal stricture, chronic pancreatitis, originally presented with n/v, esophageal stent removal in December, course c/b septic shock. Patient s/p first stage left brachiobasilic AVF on 1/5 with Dr. Oro. Vascular surgery reconsulted in setting of bilateral foot wounds and reported nonpalpable pulses. S/p RLE angio on 1/20 with PT plasty, now planning for RLE angio in OR with pedal access 2/3.     PLAN:  - pedal access site clean dry, no hematoma  - Fistula evaluated, no current intervention   - no vascular interventions at this time   - ok to restart heparin gtt     C Team Surgery  #[id number]

## 2023-02-04 NOTE — PROGRESS NOTE ADULT - SUBJECTIVE AND OBJECTIVE BOX
GENERAL SURGERY PROGRESS NOTE    pedal acces site mildly wet last night  otherwise no issues    10-point review of systems completed and negative except as noted above.      OBJECTIVE    MEDICATIONS  albuterol/ipratropium for Nebulization. 3 milliLiter(s) Nebulizer once  bisacodyl 5 milliGRAM(s) Oral every 12 hours PRN  cadexomer iodine 0.9% Gel 1 Application(s) Topical daily  calcitriol   Capsule 0.5 MICROGram(s) Oral daily  chlorhexidine 2% Cloths 1 Application(s) Topical two times a day  epoetin mejia-epbx (RETACRIT) Injectable 51185 Unit(s) SubCutaneous <User Schedule>  folic acid 1 milliGRAM(s) Oral daily  magnesium sulfate  IVPB 2 Gram(s) IV Intermittent once  metoclopramide 5 milliGRAM(s) Oral three times a day  multivitamin 1 Tablet(s) Oral daily  mupirocin 2% Ointment 1 Application(s) Topical two times a day  NIFEdipine XL 30 milliGRAM(s) Oral daily  ondansetron Injectable 4 milliGRAM(s) IV Push every 6 hours PRN  pancrelipase  (CREON  6,000 Lipase Units) 1 Capsule(s) Oral three times a day with meals  pantoprazole    Tablet 40 milliGRAM(s) Oral every 12 hours  polyethylene glycol 3350 17 Gram(s) Oral two times a day  senna 2 Tablet(s) Oral at bedtime  sodium chloride 0.9% lock flush 10 milliLiter(s) IV Push every 1 hour PRN  sucralfate 1 Gram(s) Oral every 6 hours  thiamine 100 milliGRAM(s) Oral daily  tiZANidine 2 milliGRAM(s) Oral every 8 hours PRN      PHYSICAL EXAM  T(C): 37.3 (02-04-23 @ 05:50), Max: 37.3 (02-04-23 @ 05:50)  HR: 94 (02-04-23 @ 05:50) (70 - 94)  BP: 108/58 (02-04-23 @ 05:50) (95/50 - 147/76)  RR: 16 (02-04-23 @ 05:50) (12 - 18)  SpO2: 99% (02-04-23 @ 05:50) (96% - 100%)    02-03-23 @ 07:01  -  02-04-23 @ 07:00  --------------------------------------------------------  IN: 300 mL / OUT: 380 mL / NET: -80 mL        General: Appears well, NAD  Neuro: AAOx3  CHEST: Clear to auscultation bilaterally  CV: Regular rate and rhythm  RLE distal foot w/ Kerlix wrapping; Tegaderm dressing w/ s/s output; DP/PT signal  LLE +PT/DP signal    LABS                        9.2    7.29  )-----------( 153      ( 04 Feb 2023 07:20 )             30.8     02-04    138  |  101  |  37<H>  ----------------------------<  73  3.8   |  25  |  3.06<H>    Ca    8.0<L>      04 Feb 2023 07:20  Phos  2.6     02-04  Mg     1.40     02-04      PT/INR - ( 03 Feb 2023 02:17 )   PT: 12.0 sec;   INR: 1.03 ratio         PTT - ( 03 Feb 2023 02:17 )  PTT:59.8 sec      RADIOLOGY & ADDITIONAL STUDIES

## 2023-02-04 NOTE — PROGRESS NOTE ADULT - PROBLEM SELECTOR PLAN 9
Septic shock due to pseudomonal PNA with lung abscess, morganella bacteremia and kelbsiella UTI.  Was on Z[facility]yn since 11/25, [facility] of antibiotics versus possibly longer pending imaging at 4 weeks (12/23)  - CXR 12/23 - Stable RUL lesion, with new RLL pneumonia   - seen by ID  - completed 6 wks zosyn 1/6  - per d/w ID no further imaging as 6 wks abx sufficient treatment

## 2023-02-04 NOTE — PROGRESS NOTE ADULT - SUBJECTIVE AND OBJECTIVE BOX
Patient is a 62y old  Male who presents with a chief complaint of intractable N/V , esophageal stent removal- Transfer from  (04 Feb 2023 13:12)       INTERVAL HPI/OVERNIGHT EVENTS:  Patient seen and evaluated at bedside.  Pt is resting comfortable in NAD. Denies N/V/F/C.    Allergies    Tylenol (Other)    Intolerances        Vital Signs Last 24 Hrs  T(C): 36.7 (04 Feb 2023 10:14), Max: 37.3 (04 Feb 2023 05:50)  T(F): 98 (04 Feb 2023 10:14), Max: 99.2 (04 Feb 2023 05:50)  HR: 97 (04 Feb 2023 10:14) (70 - 97)  BP: 140/72 (04 Feb 2023 10:14) (95/50 - 147/76)  BP(mean): 72 (03 Feb 2023 14:45) (60 - 72)  RR: 18 (04 Feb 2023 10:14) (12 - 18)  SpO2: 100% (04 Feb 2023 10:14) (96% - 100%)    Parameters below as of 04 Feb 2023 10:14  Patient On (Oxygen Delivery Method): room air        LABS:                        9.2    7.29  )-----------( 153      ( 04 Feb 2023 07:20 )             30.8     02-04    138  |  101  |  37<H>  ----------------------------<  73  3.8   |  25  |  3.06<H>    Ca    8.0<L>      04 Feb 2023 07:20  Phos  2.6     02-04  Mg     1.40     02-04      PT/INR - ( 04 Feb 2023 13:25 )   PT: 12.2 sec;   INR: 1.05 ratio         PTT - ( 04 Feb 2023 13:25 )  PTT:24.5 sec    CAPILLARY BLOOD GLUCOSE          Lower Extremity Physical Exam:    Vascular: DP/PT 0/4, B/L, CFT >3 seconds B/L, Temperature gradient warm to cool, B/L.   Neuro: Epicritic sensation intact to the level of digits, B/L.  Musculoskeletal/Ortho: unremarkable   Skin: Right IPJ hallux wound to subQ, fibrotic wound bed, right foot 2nd and 3rd digit medial DIPJ wound to bone with increased granulation, exposed distal phalanx to both digits, no malodor, necrotic periwound edges. Left foot heel  and 5th met base early DTI with no signs of infection.   RADIOLOGY & ADDITIONAL STUDIES:    RADIOLOGY & ADDITIONAL TESTS:

## 2023-02-04 NOTE — PROGRESS NOTE ADULT - PROBLEM SELECTOR PLAN 1
b/l heel wounds and right 2nd and 3rd toe wound.   - Xray foot negative for signs of OM  -MR foot pending   -podiatry following- concern for poor wound healing in the setting PAD- rec local wound care until revascularization   - BARRY/PVR result reviewed; mild stenosis in LLE, moderate stenosis in RLE.   - S/p RLE angio by vascular on 1/20 with PT plasty. now s/p repeat angioplasty 2/3  - f/u vascular and podiatry post op recs  2/4 vascular"-PLAN:- pedal access site clean dry, no hematoma- Fistula evaluated, no current intervention   - no vascular interventions at this time - ok to restart heparin gtt "

## 2023-02-04 NOTE — PROGRESS NOTE ADULT - PROBLEM SELECTOR PROBLEM 6
[Bilateral] : knee bilaterally [5___] : hamstring 5[unfilled]/5 [Positive] : positive Catalina [de-identified] : Neurologic: normal coordination, normal DTR UE/LE , normal sensation, normal mood and affect, orientated and able to communicate.\par \par Skin: normal skin, no rash, no ulcers and no lesions.\par \par Lymphatic: no obvious lymphadenopathy in areas examined.\par \par Constitutional: well developed and well nourished.\par \par Cardiovascular: peripheral vascular exam is grossly normal.\par \par Pulmonary: no respiratory distress, lungs clear to auscultation bilaterally.\par \par Abdomen: normal bowel sounds, non-tender, no HSM and no mass.\par  [] : non-antalgic Delusions

## 2023-02-04 NOTE — PROGRESS NOTE ADULT - ASSESSMENT
62M EtoH misuse c/b chronic pancreatitis, HCV s/p treatment (SVR), emphysema, benign esophageal stricture (s/p stent on 4/2022) admitted at [facility] w/ septic shock septic shock due to pseudomonal PNA, lung abscess, Morganella bacteremia and Klebsiella UTI (now s/p 6 wks zosyn ended 1/6) w/ hospital course c/b YUNIEL on CKD requiring HD and MICU stay, transferred here for intractable nausea/vomiting and esophageal stent removal, now s/p removal w/ EGD sig for esophagitis/gastric ulcer. Hospital course now c/b acute on chronic anemia required multiple transfusion. Now patient is noted to have skin breakdown in Right toes concerning for underline infection secondary to vascular insufficiency. Plan for repeat angiogram, and potential repeat  pop-PT bypass surgery. Course c.b by Right UE DVT, limiting IV access.

## 2023-02-04 NOTE — PROGRESS NOTE ADULT - SUBJECTIVE AND OBJECTIVE BOX
[facility] DIVISION OF KIDNEY DISEASES AND HYPERTENSION -- FOLLOW UP NOTE  --------------------------------------------------------------------------------  Chief Complaint: ESRD    24 hour events/subjective:  Pt seen and examined. No complaints at bedside. No shortness of breath.      PAST HISTORY  --------------------------------------------------------------------------------  No significant changes to PMH, PSH, FHx, SHx, unless otherwise noted    ALLERGIES & MEDICATIONS  --------------------------------------------------------------------------------  Allergies    Tylenol (Other)    Intolerances      Standing Inpatient Medications  albuterol/ipratropium for Nebulization. 3 milliLiter(s) Nebulizer once  cadexomer iodine 0.9% Gel 1 Application(s) Topical daily  calcitriol   Capsule 0.5 MICROGram(s) Oral daily  chlorhexidine 2% Cloths 1 Application(s) Topical two times a day  epoetin mejia-epbx (RETACRIT) Injectable 17060 Unit(s) SubCutaneous <User Schedule>  folic acid 1 milliGRAM(s) Oral daily  magnesium sulfate  IVPB 2 Gram(s) IV Intermittent once  metoclopramide 5 milliGRAM(s) Oral three times a day  multivitamin 1 Tablet(s) Oral daily  mupirocin 2% Ointment 1 Application(s) Topical two times a day  NIFEdipine XL 30 milliGRAM(s) Oral daily  pancrelipase  (CREON  6,000 Lipase Units) 1 Capsule(s) Oral three times a day with meals  pantoprazole    Tablet 40 milliGRAM(s) Oral every 12 hours  polyethylene glycol 3350 17 Gram(s) Oral two times a day  senna 2 Tablet(s) Oral at bedtime  sucralfate 1 Gram(s) Oral every 6 hours  thiamine 100 milliGRAM(s) Oral daily    PRN Inpatient Medications  bisacodyl 5 milliGRAM(s) Oral every 12 hours PRN  ondansetron Injectable 4 milliGRAM(s) IV Push every 6 hours PRN  sodium chloride 0.9% lock flush 10 milliLiter(s) IV Push every 1 hour PRN  tiZANidine 2 milliGRAM(s) Oral every 8 hours PRN      REVIEW OF SYSTEMS  --------------------------------------------------------------------------------  Gen: No weight changes, fatigue  Skin: No rashes  Head/Eyes/Ears/Mouth: No headache; Normal hearing; Normal vision   Respiratory: No dyspnea, cough, wheezing  CV: No chest pain, PND  GI: No abdominal pain, diarrhea, constipation, nausea, vomiting  MSK: No joint pain/swelling; no back pain; no edema  Neuro: No dizziness/lightheadedness, weakness  Heme: No easy bruising or bleeding  Endo: No heat/cold intolerance  Psych: No significant nervousness, anxiety, stress    All other systems were reviewed and are negative, except as noted.    VITALS/PHYSICAL EXAM  --------------------------------------------------------------------------------  T(C): 36.7 (02-04-23 @ 10:14), Max: 37.3 (02-04-23 @ 05:50)  HR: 97 (02-04-23 @ 10:14) (70 - 97)  BP: 140/72 (02-04-23 @ 10:14) (95/50 - 147/76)  RR: 18 (02-04-23 @ 10:14) (12 - 18)  SpO2: 100% (02-04-23 @ 10:14) (96% - 100%)  Wt(kg): --  Height (cm): 188 (02-03-23 @ 09:31)  Weight (kg): 56.9 (02-03-23 @ 09:31)  BMI (kg/m2): 16.1 (02-03-23 @ 09:31)  BSA (m2): 1.78 (02-03-23 @ 09:31)      02-03-23 @ 07:01  -  02-04-23 @ 07:00  --------------------------------------------------------  IN: 300 mL / OUT: 380 mL / NET: -80 mL      Physical Exam:  NAD, lying in bed  HEENT: no JVD  Pulm: CTA B/L  CV: S1S2+  Abd: Soft  Ext: Left foot dressing seen  Neuro: Awake and alert  Dialysis access: Left IJ non tunneled HD catheter+, DARYL AVF: + bruit heard    LABS/STUDIES  --------------------------------------------------------------------------------              9.2    7.29  >-----------<  153      [02-04-23 @ 07:20]              30.8     138  |  101  |  37  ----------------------------<  73      [02-04-23 @ 07:20]  3.8   |  25  |  3.06        Ca     8.0     [02-04-23 @ 07:20]      Mg     1.40     [02-04-23 @ 07:20]      Phos  2.6     [02-04-23 @ 07:20]      PT/INR: PT 12.0 , INR 1.03       [02-03-23 @ 02:17]  PTT: 59.8       [02-03-23 @ 02:17]      Creatinine Trend:  SCr 3.06 [02-04 @ 07:20]  SCr 2.18 [02-03 @ 02:17]  SCr 3.70 [02-02 @ 06:04]  SCr 2.83 [02-01 @ 06:00]  SCr 1.75 [01-31 @ 11:36]        Iron 95, TIBC <125, %sat --      [01-16-23 @ 11:00]  Ferritin 311      [01-03-23 @ 06:12]  PTH -- (Ca --)      [12-22-22 @ 06:40]   290  Vitamin D (25OH) 10.4      [01-04-23 @ 06:45]  HbA1c 5.0      [03-22-19 @ 09:03]  TSH 1.380      [09-01-22 @ 08:58]  Lipid: chol 147, TG 88, HDL 59, LDL --      [09-01-22 @ 08:58]    HBsAb 10.9      [01-28-23 @ 17:41]  HBsAg Nonreact      [01-28-23 @ 17:41]  HBcAb Reactive      [01-28-23 @ 17:41]  HCV 8.31, Reactive      [01-28-23 @ 17:41]

## 2023-02-04 NOTE — PROGRESS NOTE ADULT - ASSESSMENT
62M with bilateral foot wounds.   - Patient seen and evaluated.  - Afebrile, no luekocytosis   - Right IPJ hallux wound to subQ, fibrotic wound bed, right foot 2nd and 3rd digit medial DIPJ wound to bone with increased granulation, exposed distal phalanx to both digits, no malodor, necrotic periwound edges. Left foot heel  and 5th met base early DTI with no signs of infection.   - Bilateral foot xray: Bilateral soft tissue swelling. No tracking soft tissue gas collections, radiopaque foreign bodies, or gross radiographic evidence for osteomyelitis.  - No wound culture necessary 2/2 to no acute signs of infection.   - BARRY/PVRs: RABI 0.66, RTBI 0.3, L BARRY 0.76, L TBI 0.63 small arterial disease b/l   - s/p 2/3 Right anterior tibial angioplasty via pedal access - vasc planning repeat angio in OR given small veins on duplex, appreciated   - Pod plan local wound care pending vascular recs.   - Discussed with attending. 62M with bilateral foot wounds.   - Patient seen and evaluated.  - Afebrile, no luekocytosis   - Right IPJ hallux wound to subQ, fibrotic wound bed, right foot 2nd and 3rd digit medial DIPJ wound to bone with increased granulation, exposed distal phalanx to both digits, no malodor, necrotic periwound edges. Left foot heel  and 5th met base early DTI with no signs of infection.   - Bilateral foot xray: Bilateral soft tissue swelling. No tracking soft tissue gas collections, radiopaque foreign bodies, or gross radiographic evidence for osteomyelitis.  - No wound culture necessary 2/2 to no acute signs of infection.   - BARRY/PVRs: RABI 0.66, RTBI 0.3, L BARRY 0.76, L TBI 0.63 small arterial disease b/l   - s/p 2/3 Right anterior tibial angioplasty via pedal access, appreciated   - Pod plan local wound care pending vascular recs.   - Discussed with attending. 62M with bilateral foot wounds.   - Patient seen and evaluated.  - Afebrile, no luekocytosis   - Right IPJ hallux wound to subQ, fibrotic wound bed, right foot 2nd and 3rd digit medial DIPJ wound to bone with increased granulation, exposed distal phalanx to both digits, no malodor, necrotic periwound edges. Left foot heel  and 5th met base early DTI with no signs of infection.   - Bilateral foot xray: Bilateral soft tissue swelling. No tracking soft tissue gas collections, radiopaque foreign bodies, or gross radiographic evidence for osteomyelitis.  - No wound culture necessary 2/2 to no acute signs of infection.   - BARRY/PVRs: RABI 0.66, RTBI 0.3, L BARRY 0.76, L TBI 0.63 small arterial disease b/l   - s/p 2/3 Right anterior tibial angioplasty via pedal access, appreciated   - Pod plan local wound care. No surgical intervention during this admission.   - Discussed with attending.

## 2023-02-04 NOTE — PROGRESS NOTE ADULT - PROBLEM SELECTOR PLAN 3
Pt with hypophosphatemia. Pt was receiving sevelamer, was discontinued on 1/29/23. Serum phos is low at 2.6 today. Recommend oral phosphorus repletion. Monitor serum phosphorus level.    If you have any questions, please feel free to contact fellow  Jeff Pete MD  Nephrology Fellow  X [phone] / Microsoft Teams(Preferred)  (After 5pm or on weekends please page the on-call fellow)

## 2023-02-04 NOTE — CHART NOTE - NSCHARTNOTEFT_GEN_A_CORE
Medicine NP note     Pt s/p angiogram via pedal access right foot on 2/3 ,  pedal access site clean dry, no hematoma, as per vascular is ok to restart heparin gtt, Attending Dr. Caraballo notified , heparin gtt ordered at 11 ml/hr , no heparin bolus ,  CBC q 8 hrs x 24 hours , to notify provider if PTT less than 60 and more than 69 , ( pt known as bleeding to site after triple lumien -No strenuous activity x 3 weeks.  No heavy lifting > 5-10lbs x one week.  Monitor right groin for bleeding, pain, swelling, discharge.  Notify MD if symptoms occur.  You may shower, no baths/swimming x one week. and right ankle angiogram site ) , pt kaur comfortable , endorse to night provider to monitor patient for bleeding     Kourtney Edwards NP-C  Department of Medicine- [facility]  In House Pager #[phone]

## 2023-02-04 NOTE — PROGRESS NOTE ADULT - PROBLEM SELECTOR PLAN 1
Pt with YUNIEL on advanced CKD in the setting of recent COVID-19 and decreased oral intake. Pt was initiated on HD on 1/28/23 due to worsening kidney function with uremia. Pt underwent LUE AVF creation on 1/5/23. Vascular surgery note from 2/3/23 reviewed, LE angio done yesterday. Plan for HD treatment today. Monitor labs and urine output. Avoid any potential nephrotoxins. Dose medications as per eGFR.

## 2023-02-05 NOTE — PROGRESS NOTE ADULT - PROBLEM SELECTOR PLAN 9
Septic shock due to pseudomonal PNA with lung abscess, morganella bacteremia and kelbsiella UTI.  Was [facility] since 11[facility]25, [facility] week course of antibiotics versus possibly longer pending imaging at 4 weeks (12/23)  - CXR 12/23 - Stable RUL lesion, with new RLL pneumonia   - seen by ID  - completed 6 wks zosyn 1/6  - per d/w ID no further imaging as 6 wks abx sufficient treatment

## 2023-02-05 NOTE — PROGRESS NOTE ADULT - PROBLEM SELECTOR PLAN 1
9/15/2022      RE: South Cagle  [address]  [facility] [address]     Dear Colleague,    Thank you for the opportunity to participate in the care of your patient, South Cagle, at the [facility] at [facility]. Please see a copy of my visit note below.    SaloniholgerMonika elder  [address]  [address]  [facility] [address]    RE:  South Cagle  :  2020  [place] MRN:  [id number]  Date of visit:  September 15, 2022    Dear Colleague    I had the pleasure of seeing your patient, South Jacobo, today through the [facility] in urology consultation for the question of bilateral undescended testicles.  Please see below the details of this visit and my impression and plans discussed with the family.    CC:  Bilateral undescended testicles    HPI:  South Cagle is a 2 year old child whom I was asked to see in consultation for the above. He is accompanied by his mom today, history from chart review and mom today.    South is a 2 year old boy with history of febrile seizures as well as bilateral undescended testicles. On chart review, he was born full term, and since birth has had a non-palpable right testicle and left testicle in the inguinal ring. He has not been seen by pediatric urology in the past, and recently moved with his mother to [place], prior care received in [place].    Mom believes his testicles have intermittently been palpable and in the scrotum, and will alternate which one may be larger with intermittent swelling. The swelling is mainly on the right side, though recently he had an episode of swelling on the left side.     Otherwise, South Jacobo has a history of seizure disorder - he has not had any recent seizures and is currently not on any medications for seizures. She has tried to fill rectal diazepam for his seizures, but has never been  "able to get this when at the pharmacy. Mom reports multiple family members who have had undescended testicles.    PMH:    Past Medical History:   Diagnosis Date     Seizures (H)      PSH:     Past Surgical History:   Procedure Laterality Date     ANESTHESIA OUT OF OR MRI 1.5T N/A 8/15/2022    Procedure: MRI 1.5T Brain;  Surgeon: GENERIC ANESTHESIA PROVIDER;  Location: [facility]        Meds, allergies, family history, social history reviewed per intake form and confirmed in our EMR.    ROS:  Negative on a 12-point scale, except for any pertinent positives mentioned in the HPI.    PE:  Height 0.977 m (3' 2.47\"), weight 15.6 kg (34 lb 6.3 oz).  Body mass index is 16.34 kg/m .  General:  Well-appearing child, in no apparent distress.  HEENT:  Normocephalic, normal facies, moist mucous membranes  Resp:  Symmetric chest wall movement, no audible respirations  Abd:  Soft, non-tender, non-distended, no palpable masses  Genitalia:  Phallus circumcised, no adhesions, scrotum symmetric but empty with both testicles high in inguinal canal  Spine:  Straight, no palpable sacral defects  Neuromuscular:  Muscles symmetrically bulked/developed  Ext:  Full range of motion  Skin:  Warm, well-perfused    Impression:    # Bilateral undescended testicles - We discussed that South Segals testicles bilaterally are undescended. We discussed the importance of bringing the testicles down to the scrotum so the testicles can develop more normally. We also discussed the risk of malignancy with undescended testicles, that this risk will always be higher than boys who have descended testicles, but that the risk can be significantly lowered by bring the testicles down to the scrotum before puberty. We discussed risks of the procedure include infection, bleeding, injury to the testicles, nerves or blood vessels, as well as the risks of anesthsia.    We discussed the intermittent swelling may be due to a hernia from a likely communication " related to his undescended testicles. We discussed this would be corrected at the same time as surgery to bring his testicles down to the scrotum.    We discussed that surgery may be able to be performed via a scrotal incision on each side, but a groin incision may also be necessary to loosen up the attachments to help bring the testicles down to the scrotum - this may be necessary on one or both sides.     We discussed the expected post-operative course, and that South Jacobo would likely bounce back quickly after surgery, and frequently tylenol and motrin are sufficient for pain control. This would be an outpatient procedure, and he would be able to go home the same day after surgery. He would have skin glue to help with closure of any scrotal wounds, and if groin incisions were used, there would be a gauze dressing there temporarily. We would ask that South Jacobo take it easily for one month after, avoiding strenuous exercise or activity, along with straddle toys/activities including bikes, toy horses, etc.    Plan:    - Schedule for bilateral scrotal, possible inguinal, orchiopexy    Thank you very much for allowing me the opportunity to participate in this nice family's care with you.    Sincerely,    Pediatric Urology, [facility]    Cosmo Ruiz MD  Urology Resident, PGY-4    ATTESTATION: I provided direct supervision and I was actively involved in the decision making process of the patient. I discussed/reviewed the case with the resident physician, examined the patient and agree with the findings and plan as documented in his note.  ______________________________________________________________________    Cedric Abreu MD  Pediatric Urology           b/l heel wounds and right 2nd and 3rd toe wound.   - Xray foot negative for signs of OM  -MR foot pending   -podiatry following- concern for poor wound healing in the setting PAD- rec local wound care until revascularization   - BARRY/PVR result reviewed; mild stenosis in LLE, moderate stenosis in RLE.   - S/p RLE angio by vascular on 1/20 with PT plasty. now s/p repeat angioplasty 2/3  - f/u vascular and podiatry post op recs  2/4 vascular"-PLAN:- pedal access site clean dry, no hematoma- Fistula evaluated, no current intervention   - no vascular interventions at this time - ok to restart heparin gtt "  2/5 keep ptt at 60 and monitor cbc q12. keep Hgb >7

## 2023-02-05 NOTE — PROGRESS NOTE ADULT - PROBLEM SELECTOR PLAN 3
-  hep gtt restarted 2/4, monitor cbc q12. keep hgb >7  - plan to transition to Apixaban if no further surgical intervention planned as per vascular/podiatry   - trend cbcs

## 2023-02-05 NOTE — PROGRESS NOTE ADULT - SUBJECTIVE AND OBJECTIVE BOX
keep ptt about 60  cbc q12 Patient is a 62y old  Male who presents with a chief complaint of intractable N/V , esophageal stent removal (05 Feb 2023 10:13)      SUBJECTIVE / OVERNIGHT EVENTS:  resting in bed, wants pain medication- will ask RN to give xanaflex    MEDICATIONS  (STANDING):  albuterol/ipratropium for Nebulization. 3 milliLiter(s) Nebulizer once  cadexomer iodine 0.9% Gel 1 Application(s) Topical daily  calcitriol   Capsule 0.5 MICROGram(s) Oral daily  chlorhexidine 2% Cloths 1 Application(s) Topical two times a day  epoetin mejia-epbx (RETACRIT) Injectable 20472 Unit(s) SubCutaneous <User Schedule>  folic acid 1 milliGRAM(s) Oral daily  heparin  Infusion 1700 Unit(s)/Hr (17 mL/Hr) IV Continuous <Continuous>  lidocaine   4% Patch 1 Patch Transdermal every 24 hours  metoclopramide 5 milliGRAM(s) Oral three times a day  multivitamin 1 Tablet(s) Oral daily  NIFEdipine XL 30 milliGRAM(s) Oral daily  pancrelipase  (CREON  6,000 Lipase Units) 1 Capsule(s) Oral three times a day with meals  pantoprazole    Tablet 40 milliGRAM(s) Oral every 12 hours  polyethylene glycol 3350 17 Gram(s) Oral two times a day  senna 2 Tablet(s) Oral at bedtime  sucralfate 1 Gram(s) Oral every 6 hours  thiamine 100 milliGRAM(s) Oral daily    MEDICATIONS  (PRN):  bisacodyl 5 milliGRAM(s) Oral every 12 hours PRN Constipation  ondansetron Injectable 4 milliGRAM(s) IV Push every 6 hours PRN Nausea and/or Vomiting  oxyCODONE    IR 10 milliGRAM(s) Oral every 4 hours PRN Severe Pain (7 - 10)  sodium chloride 0.9% lock flush 10 milliLiter(s) IV Push every 1 hour PRN Pre/post blood products, medications, blood draw, and to maintain line patency  tiZANidine 2 milliGRAM(s) Oral every 8 hours PRN Muscle Cramps      I&O's Summary    04 Feb 2023 07:01  -  05 Feb 2023 07:00  --------------------------------------------------------  IN: 1888 mL / OUT: 1400 mL / NET: 488 mL      Vital Signs Last 24 Hrs  T(C): 36.4 (05 Feb 2023 10:00), Max: 37.3 (05 Feb 2023 02:05)  T(F): 97.6 (05 Feb 2023 10:00), Max: 99.1 (05 Feb 2023 02:05)  HR: 99 (05 Feb 2023 10:00) (87 - 99)  BP: 131/78 (05 Feb 2023 10:00) (126/65 - 149/79)  BP(mean): --  RR: 18 (05 Feb 2023 10:00) (17 - 20)  SpO2: 100% (05 Feb 2023 10:00) (100% - 100%)    Parameters below as of 05 Feb 2023 10:00  Patient On (Oxygen Delivery Method): room air      PHYSICAL EXAM:  GENERAL: NAD, well-developed  HEAD:  Atraumatic, Normocephalic  EYES: EOMI, PERRLA, conjunctiva and sclera clear  NECK: Supple, No JVD  L neck HD line  CHEST/LUNG: Clear to auscultation bilaterally; No wheeze  HEART: Regular rate and rhythm; No murmurs, rubs, or gallops  ABDOMEN: Soft, Nontender, Nondistended; Bowel sounds present  EXTREMITIES:  2+ Peripheral Pulses, No clubbing, cyanosis, or edema  PSYCH: Alert  NEUROLOGY: non-focal      LABS:                        8.3    7.11  )-----------( 208      ( 05 Feb 2023 03:04 )             27.5     02-05    137  |  103  |  20  ----------------------------<  179<H>  3.2<L>   |  24  |  2.26<H>    Ca    7.6<L>      05 Feb 2023 03:04  Phos  1.7     02-05  Mg     1.60     02-05      PT/INR - ( 04 Feb 2023 13:25 )   PT: 12.2 sec;   INR: 1.05 ratio    PTT - ( 05 Feb 2023 03:04 )  PTT:33.2 sec        Care Discussed with Consultants/Other Providers:  keep ptt about 60  cbc q12

## 2023-02-05 NOTE — PROGRESS NOTE ADULT - ASSESSMENT
62M EtoH misuse c/b chronic pancreatitis, HCV s/p treatment (SVR), emphysema, benign esophageal stricture (s/p stent on 4/2022) admitted at [facility] w/ septic shock septic shock due to pseudomonal PNA, lung abscess, Morganella bacteremia and Klebsiella UTI (now s/p 6 wks zosyn ended 1/6) w/ hospital course c/b YUNIEL on CKD requiring HD and MICU stay, transferred here for intractable nausea/vomiting and esophageal stent removal, now s/p removal w/ EGD sig for esophagitis/gastric ulcer. Hospital course now c/b acute on chronic anemia required multiple transfusion. Now patient is noted to have skin breakdown in Right toes concerning for underline infection secondary to vascular insufficiency. Plan for repeat angiogram, and potential repeat  pop-PT bypass surgery. Course c.b by Right UE DVT, limiting IV access.

## 2023-02-05 NOTE — CHART NOTE - NSCHARTNOTEFT_GEN_A_CORE
< from: [facility] Duplex Hemodialysis Access, Left. (02.05.23 @ 14:50) >    Summary/Impressions:  Significantly elevated flow velocities noted at the  anastomotic site of the left upper extremity  brachiobasilic arteriovenous fistula. Hyperechoic  occlusive material noted within the anastomotic site  suggests stenosis with associated partial thrombosis.  The maximal volume flow velocity obtained within the  fistula is 653 ml/min at the mid segment.  The remaining outflow vein appears patent, without  evidence of thrombosis.  Results communicated to Dr. EDGAR Caraballo on 02/05/23 at 3:05  pm.    d/w PA , c/w heparin drip. Increase drip to get PTT 60- 80  monitor cbc q12 , keep hgb >7  Vascular called to f/u < from: [facility] Duplex Hemodialysis Access, Left. (02.05.23 @ 14:50) >    Summary/Impressions:  Significantly elevated flow velocities noted at the  anastomotic site of the left upper extremity  brachiobasilic arteriovenous fistula. Hyperechoic  occlusive material noted within the anastomotic site  suggests stenosis with associated partial thrombosis.  The maximal volume flow velocity obtained within the  fistula is 653 ml/min at the mid segment.  The remaining outflow vein appears patent, without  evidence of thrombosis.  Results communicated to Dr. EDGAR Caraballo on 02/05/23 at 3:05  pm.    d/w PA , c/w heparin drip. Increase drip to get PTT 60- 80  monitor cbc q12 , keep hgb >7  Vascular called to f/[place] [id number] < from: [facility] Duplex Hemodialysis Access, Left. (02.05.23 @ 14:50) >    Summary/Impressions:  Significantly elevated flow velocities noted at the  anastomotic site of the left upper extremity  brachiobasilic arteriovenous fistula. Hyperechoic  occlusive material noted within the anastomotic site  suggests stenosis with associated partial thrombosis.  The maximal volume flow velocity obtained within the  fistula is 653 ml/min at the mid segment.  The remaining outflow vein appears patent, without  evidence of thrombosis.  Results communicated to Dr. EDGAR Caraballo on 02/05/23 at 3:05  pm.    d/w PA , c/w heparin drip. Increase drip to get PTT 60- 80  monitor cbc q12 , keep hgb >7  Vascular called to f/u at [id number]- spoke with Tatianna.  d/w PA and Vascular

## 2023-02-06 NOTE — PROGRESS NOTE ADULT - SUBJECTIVE AND OBJECTIVE BOX
SURGERY  Pager: #[phone]    INTERVAL EVENTS/SUBJECTIVE: No acute events overnight. Patient seen and examined at bedside.     ______________________________________________  OBJECTIVE:   T(C): 36.7 (02-06-23 @ 09:30), Max: 37.1 (02-05-23 @ 17:22)  HR: 91 (02-06-23 @ 09:30) (86 - 98)  BP: 124/75 (02-06-23 @ 09:30) (124/75 - 128/72)  RR: 18 (02-06-23 @ 09:30) (17 - 18)  SpO2: 100% (02-06-23 @ 09:30) (100% - 100%)  Wt(kg): --  CAPILLARY BLOOD GLUCOSE        I&O's Detail    05 Feb 2023 07:01  -  06 Feb 2023 07:00  --------------------------------------------------------  IN:    Heparin: 170 mL    Heparin Infusion: 36 mL    Oral Fluid: 1440 mL  Total IN: 1646 mL    OUT:    Blood Loss (mL): 0 mL  Total OUT: 0 mL    Total NET: 1646 mL          Physical exam:  General: Appears well, NAD  Neuro: AAOx3  CHEST: Clear to auscultation bilaterally  CV: Regular rate and rhythm  EXT:   RUE edematous, motor/sensation intact  LUE brachiobasilic AVF with palpable thrill, motor/sensation intact   RLE distal foot w/ Kerlix wrapping; Tegaderm dressing w/ s/s output; DP/PT signal  LLE +PT/DP signal    ______________________________________________  LABS:  CBC Full  -  ( 06 Feb 2023 05:20 )  WBC Count : 7.19 K/uL  RBC Count : 2.67 M/uL  Hemoglobin : 8.1 g/dL  Hematocrit : 26.9 %  Platelet Count - Automated : 174 K/uL  Mean Cell Volume : 100.7 fL  Mean Cell Hemoglobin : 30.3 pg  Mean Cell Hemoglobin Concentration : 30.1 gm/dL  Auto Neutrophil # : 4.73 K/uL  Auto Lymphocyte # : 1.34 K/uL  Auto Monocyte # : 0.85 K/uL  Auto Eosinophil # : 0.20 K/uL  Auto Basophil # : 0.04 K/uL  Auto Neutrophil % : 65.8 %  Auto Lymphocyte % : 18.6 %  Auto Monocyte % : 11.8 %  Auto Eosinophil % : 2.8 %  Auto Basophil % : 0.6 %    02-06    138  |  106  |  31<H>  ----------------------------<  79  4.5   |  23  |  3.53<H>    Ca    8.1<L>      06 Feb 2023 05:20  Phos  2.6     02-06  Mg     1.50     02-06      _____________________________________________  RADIOLOGY:

## 2023-02-06 NOTE — PROGRESS NOTE ADULT - PROBLEM SELECTOR PLAN 4
Diff include GIB iso PUD and medication non-adherence (intermittently refusing PPI/ Carafate) +/- anemia of chronic disease. Work up not consistent w/ hemolysis   -CT Abd pelvis w/o RP bleed  - Previous EGD 12/16/22 w/ esophagitis  - Repeat EGD 1/13 w/o active bleeding  - Colonoscopy 1/13 w/o active bleeding. W/ mucosal ulceration. Biopsied  - Bx-Colonic mucosa with active chronic colitis  - s/p 5 units PRBC on this admission (last 1/31) cont monitor Hgb daily, transfuse to hgb>7  - c/w PPI BID  - C/w Carafate 1G Q6H  - Outpatient GI follow up w/ repeat upper endoscopy in 6-8 weeks and colonoscopy  - keep type and screen active, and transfuse for PRBC <7

## 2023-02-06 NOTE — PROGRESS NOTE ADULT - PROBLEM SELECTOR PLAN 2
YUNIEL on CKD now progressed to ESRD, started on HD   - [facility] renal duplex with severe stenosis of the left renal artery; vascular consulted, no plan for intervention for HESHAM  - non-tunnelled triple lumen HD cath placed on 1/27  - s/p AVF w/ vascular on 1/5 to the L arm, L arm precautions. Vascular following. Planning for stage 2 intervention   - sevelamer 800 mg TID, bicarb 1300 mg TID; calcitriol increased to 0.5 for secondary hyperparathyroidism per renal  - c/w HD as per renal

## 2023-02-06 NOTE — PROGRESS NOTE ADULT - PROBLEM SELECTOR PLAN 5
Chronic pancreatitis; previously on Creon, will resume given likely exocrine insufficiency with malabsorption given BMI of 17.   - w/ persistent abdominal pain   - c/w Creon, intermittently refusing, educated on importance   - c/w oxycodone 10 mg q4h prn  - Bowel regimen while on opioids  - c/w reglan 5mg TID given mod food residue on EGD  - Pain mgt consulted; appreciate recs

## 2023-02-06 NOTE — PROGRESS NOTE ADULT - PROBLEM SELECTOR PLAN 1
b/l heel wounds and right 2nd and 3rd toe wound.   - Xray foot negative for signs of OM  - MR foot pending   - podiatry following- concern for poor wound healing in the setting PAD- rec local wound care until revascularization   - BARRY/PVR result reviewed; mild stenosis in LLE, moderate stenosis in RLE.   - S/p RLE angio by vascular on 1/20 with PT plasty. now s/p repeat angioplasty 2/3  - f/u vascular and podiatry post op recs

## 2023-02-06 NOTE — PROGRESS NOTE ADULT - SUBJECTIVE AND OBJECTIVE BOX
Patient is a 62y old  Male who presents with a chief complaint of intractable N/V , esophageal stent removal (06 Feb 2023 11:54)    Canelo Herrera MD   [facility] Division of Hospital Medicine   Pager [phone]  Reachable on [employer] Teams     SUBJECTIVE / OVERNIGHT EVENTS:  Patient seen and examined this morning. He states that he continues to have abdominal pain. Denies any fevers, chills, nausea or vomiting.    MEDICATIONS  (STANDING):  albuterol/ipratropium for Nebulization. 3 milliLiter(s) Nebulizer once  cadexomer iodine 0.9% Gel 1 Application(s) Topical daily  calcitriol   Capsule 0.5 MICROGram(s) Oral daily  chlorhexidine 2% Cloths 1 Application(s) Topical two times a day  epoetin mejia-epbx (RETACRIT) Injectable 84318 Unit(s) SubCutaneous <User Schedule>  folic acid 1 milliGRAM(s) Oral daily  heparin  Infusion. 1900 Unit(s)/Hr (19 mL/Hr) IV Continuous <Continuous>  lidocaine   4% Patch 1 Patch Transdermal every 24 hours  metoclopramide 5 milliGRAM(s) Oral three times a day  multivitamin 1 Tablet(s) Oral daily  NIFEdipine XL 30 milliGRAM(s) Oral daily  pancrelipase  (CREON  6,000 Lipase Units) 1 Capsule(s) Oral three times a day with meals  pantoprazole    Tablet 40 milliGRAM(s) Oral every 12 hours  polyethylene glycol 3350 17 Gram(s) Oral two times a day  senna 2 Tablet(s) Oral at bedtime  sucralfate 1 Gram(s) Oral every 6 hours  thiamine 100 milliGRAM(s) Oral daily    MEDICATIONS  (PRN):  bisacodyl 5 milliGRAM(s) Oral every 12 hours PRN Constipation  ondansetron Injectable 4 milliGRAM(s) IV Push every 6 hours PRN Nausea and/or Vomiting  oxyCODONE    IR 10 milliGRAM(s) Oral every 4 hours PRN Severe Pain (7 - 10)  sodium chloride 0.9% lock flush 10 milliLiter(s) IV Push every 1 hour PRN Pre/post blood products, medications, blood draw, and to maintain line patency  tiZANidine 2 milliGRAM(s) Oral every 8 hours PRN Muscle Cramps      Vital Signs Last 24 Hrs  T(C): 36.7 (06 Feb 2023 09:30), Max: 37.1 (05 Feb 2023 17:22)  T(F): 98 (06 Feb 2023 09:30), Max: 98.8 (05 Feb 2023 21:00)  HR: 91 (06 Feb 2023 09:30) (86 - 98)  BP: 124/75 (06 Feb 2023 09:30) (124/75 - 128/72)  BP(mean): --  RR: 18 (06 Feb 2023 09:30) (17 - 18)  SpO2: 100% (06 Feb 2023 09:30) (100% - 100%)  CAPILLARY BLOOD GLUCOSE        I&O's Summary    05 Feb 2023 07:01  -  06 Feb 2023 07:00  --------------------------------------------------------  IN: 1646 mL / OUT: 0 mL / NET: 1646 mL        General: frail/ cachectic appearing man sitting up in bed NAD  Eyes: conjunctiva clear, nonicteric sclera  Respiratory: No respiratory distress, CTABL, No rales, rhonchi, wheezing.  Cardiovascular: S1,S2; Regular rate and rhythm; No m/g/r.  Gastrointestinal: midline surgical scar - healed, soft, mild tenderness throughout the abdomen, Nondistended; +BS.   Extremities: RUE swelling, LUE fistual with palpable thrill. No c/c/e; warm to touch  Skin: No rashes, No erythema   Psych: appropriate mood and affect    LABS:                        8.1    7.19  )-----------( 174      ( 06 Feb 2023 05:20 )             26.9     02-06    138  |  106  |  31<H>  ----------------------------<  79  4.5   |  23  |  3.53<H>    Ca    8.1<L>      06 Feb 2023 05:20  Phos  2.6     02-06  Mg     1.50     02-06      PT/INR - ( 04 Feb 2023 13:25 )   PT: 12.2 sec;   INR: 1.05 ratio         PTT - ( 06 Feb 2023 05:20 )  PTT:22.5 sec          RADIOLOGY & ADDITIONAL TESTS:    Imaging Personally Reviewed:    Consultant(s) Notes Reviewed:      Care Discussed with Consultants/Other Providers: VAscular, planned for stage 2 AVF this week

## 2023-02-06 NOTE — PROGRESS NOTE ADULT - ASSESSMENT
62M EtoH misuse c/b chronic pancreatitis, HCV s/p treatment (SVR), emphysema, benign esophageal stricture (s/p stent on 4/2022) admitted at [facility] w/ septic shock septic shock due to pseudomonal PNA, lung abscess, Morganella bacteremia and Klebsiella UTI (now s/p 6 wks zosyn ended 1/6) w/ hospital course c/b YUNIEL on CKD requiring HD and MICU stay, transferred here for intractable nausea/vomiting and esophageal stent removal, now s/p removal w/ EGD sig for esophagitis/gastric ulcer. Hospital course now c/b acute on chronic anemia required multiple transfusion. Now patient is noted to have skin breakdown in Right toes concerning for underline infection secondary to vascular insufficiency. Plan for repeat angiogram, and potential repeat  pop-PT bypass surgery. Course c.b by Right UE DVT, limiting IV access.

## 2023-02-06 NOTE — PROGRESS NOTE ADULT - PROBLEM SELECTOR PLAN 9
Septic shock due to pseudomonal PNA with lung abscess, morganella bacteremia and kelbsiella UTI.  Was on Zosyn since 11/25, as per ID in [facility] who recommended 4 week course of antibiotics versus possibly longer pending imaging at 4 weeks (12/23)  - CXR 12/23 - Stable RUL lesion, with new RLL pneumonia   - seen by ID, completed 6 wks zosyn 1/6  - per ID no further imaging indicated as 6 wks abx sufficient treatment

## 2023-02-06 NOTE — PROGRESS NOTE ADULT - ASSESSMENT
62M hx esophageal stricture, chronic pancreatitis, originally presented with n/v, esophageal stent removal in December, course c/b septic shock. Patient s/p first stage left brachiobasilic AVF on 1/5 with Dr. Oro. Vascular surgery reconsulted in setting of bilateral foot wounds and reported nonpalpable pulses. S/p RLE angio on 1/20 with PT plasty, now s/p RLE angio in OR with pedal access 2/3.     PLAN:  - Pedal access site clean dry, no hematoma  - Fistula evaluated, duplex reviewed, no current intervention   - If patient remaining in hospital, can consider second stage brachiobasilic AVF     C Team Surgery  #[id number]    62M hx esophageal stricture, chronic pancreatitis, originally presented with n/v, esophageal stent removal in December, course c/b septic shock. Patient s/p first stage left brachiobasilic AVF on 1/5 with Dr. Oro. Vascular surgery reconsulted in setting of bilateral foot wounds and reported nonpalpable pulses. S/p RLE angio on 1/20 with PT plasty, now s/p RLE angio in OR with pedal access 2/3.     PLAN:  - Pedal access site clean dry, no hematoma  - Fistula evaluated, duplex reviewed, no current intervention   - Given patient still in hospital, plan for second stage brachiobasilic AVF on Thursday  - Appreciate documented medical optimization prior to OR    C Team Surgery  #[id number]

## 2023-02-06 NOTE — ED ADULT TRIAGE NOTE - AS TEMP SITE
NURSING ADMISSION NOTE      Patient admitted via Cart  Oriented to room. Safety precautions initiated. Bed in low position. Call light in reach. A&Ox4  Room air  IV 0.9% @ 75 mL/hr  Speech seen and cleared for regular diet  Carb controlled diet 1800  Standby assist    Accucheck QID  Insulin correction and carb coverage  HOB greater than 30  Cardiac electrolyte protocol    Maintain SBP between 100-220  Neurocheck Q2H  NIH Daily    MRI completed today    Complained of headache shortly after admission process completed; received Tylenol 650mg PRN    Family at bedside  All needs met at this time    After return from MRI; continued to c/o headache  Endorsed to night RN of headache; MD made aware.  Ice pack given at this time; awaiting for additional orders     ADDENDUM:  Diabetes education given; booklet given to patient and family oral

## 2023-02-07 NOTE — PROGRESS NOTE ADULT - SUBJECTIVE AND OBJECTIVE BOX
Patient is a 62y old  Male who presents with a chief complaint of intractable N/V , esophageal stent removal (07 Feb 2023 10:47)    Canelo Herrera MD   [facility] Division of Hospital Medicine   Pager [phone]  Reachable on [employer] Teams     SUBJECTIVE / OVERNIGHT EVENTS:  Patient seen and examined today this morning while in dialysis. He denies and fevers, chills nausea vomiting, abdominal pain, chest pain.   No changes in stool. Still reporting unchanged abdominal pain for the last month.     MEDICATIONS  (STANDING):  albuterol/ipratropium for Nebulization. 3 milliLiter(s) Nebulizer once  cadexomer iodine 0.9% Gel 1 Application(s) Topical daily  calcitriol   Capsule 0.5 MICROGram(s) Oral daily  chlorhexidine 2% Cloths 1 Application(s) Topical two times a day  epoetin mejia-epbx (RETACRIT) Injectable 68682 Unit(s) SubCutaneous <User Schedule>  folic acid 1 milliGRAM(s) Oral daily  heparin  Infusion. 1900 Unit(s)/Hr (19 mL/Hr) IV Continuous <Continuous>  lidocaine   4% Patch 1 Patch Transdermal every 24 hours  magnesium oxide 400 milliGRAM(s) Oral three times a day with meals  metoclopramide 5 milliGRAM(s) Oral three times a day  multivitamin 1 Tablet(s) Oral daily  NIFEdipine XL 30 milliGRAM(s) Oral daily  pancrelipase  (CREON  6,000 Lipase Units) 1 Capsule(s) Oral three times a day with meals  pantoprazole    Tablet 40 milliGRAM(s) Oral every 12 hours  polyethylene glycol 3350 17 Gram(s) Oral two times a day  senna 2 Tablet(s) Oral at bedtime  sucralfate 1 Gram(s) Oral every 6 hours  thiamine 100 milliGRAM(s) Oral daily    MEDICATIONS  (PRN):  bisacodyl 5 milliGRAM(s) Oral every 12 hours PRN Constipation  ondansetron Injectable 4 milliGRAM(s) IV Push every 6 hours PRN Nausea and/or Vomiting  oxyCODONE    IR 10 milliGRAM(s) Oral every 4 hours PRN Severe Pain (7 - 10)  sodium chloride 0.9% lock flush 10 milliLiter(s) IV Push every 1 hour PRN Pre/post blood products, medications, blood draw, and to maintain line patency  tiZANidine 2 milliGRAM(s) Oral every 8 hours PRN Muscle Cramps      Vital Signs Last 24 Hrs  T(C): 36.8 (07 Feb 2023 10:49), Max: 37.2 (06 Feb 2023 17:30)  T(F): 98.2 (07 Feb 2023 10:49), Max: 99 (06 Feb 2023 17:30)  HR: 99 (07 Feb 2023 10:49) (81 - 99)  BP: 123/68 (07 Feb 2023 10:49) (123/68 - 153/83)  BP(mean): --  RR: 18 (07 Feb 2023 10:49) (17 - 18)  SpO2: 100% (07 Feb 2023 10:49) (100% - 100%)  CAPILLARY BLOOD GLUCOSE      POCT Blood Glucose.: 116 mg/dL (07 Feb 2023 08:52)    I&O's Summary    06 Feb 2023 07:01  -  07 Feb 2023 07:00  --------------------------------------------------------  IN: 273 mL / OUT: 275 mL / NET: -2 mL    07 Feb 2023 07:01  -  07 Feb 2023 15:29  --------------------------------------------------------  IN: 470 mL / OUT: 1400 mL / NET: -930 mL        General: frail/ cachectic appearing man sitting up in bed NAD  Eyes: conjunctiva clear, nonicteric sclera  Respiratory: No respiratory distress, CTABL, No rales, rhonchi, wheezing.  Cardiovascular: S1,S2; Regular rate and rhythm; No m/g/r.  Gastrointestinal: midline surgical scar - healed, soft, mild tenderness throughout the abdomen, Nondistended; +BS.   Extremities: RUE swelling, LUE fistua with palpable thrill. No c/c/e; warm to touch  Skin: No rashes, No erythema   Psych: appropriate mood and affect      LABS:                        8.7    7.30  )-----------( 188      ( 07 Feb 2023 02:50 )             29.4     02-07    137  |  107  |  46<H>  ----------------------------<  99  4.5   |  21<L>  |  4.27<H>    Ca    8.5      07 Feb 2023 02:50  Phos  3.2     02-07  Mg     1.40     02-07      PTT - ( 07 Feb 2023 10:00 )  PTT:103.3 sec          RADIOLOGY & ADDITIONAL TESTS:    Imaging Personally Reviewed:    Consultant(s) Notes Reviewed:      Care Discussed with Consultants/Other Providers:

## 2023-02-07 NOTE — PROGRESS NOTE ADULT - ASSESSMENT
62M hx esophageal stricture, chronic pancreatitis, originally presented with n/v, esophageal stent removal in December, course c/b septic shock. Patient s/p first stage left brachiobasilic AVF on 1/5 with Dr. Oro. Vascular surgery reconsulted in setting of bilateral foot wounds and reported nonpalpable pulses. S/p RLE angio on 1/20 with PT plasty, now s/p RLE angio in OR with pedal access 2/3.     PLAN:  - Pedal access site clean dry, no hematoma  - Fistula evaluated, duplex reviewed, no current intervention   - Given patient still in hospital, plan for second stage brachiobasilic AVF on Thursday  - Appreciate documented medical optimization prior to OR    C Team Surgery  #[id number]  62M hx esophageal stricture, chronic pancreatitis, originally presented with n/v, esophageal stent removal in December, course c/b septic shock. Patient s/p first stage left brachiobasilic AVF on 1/5 with Dr. Oro. Vascular surgery reconsulted in setting of bilateral foot wounds and reported nonpalpable pulses. S/p RLE angio on 1/20 with PT plasty, now s/p RLE angio in OR with pedal access 2/3.     PLAN:  - Pedal access site clean dry, no hematoma  - Fistula evaluated, duplex reviewed, no current intervention   - Given patient still in hospital, plan for second stage brachiobasilic AVF, vein transposition on Thursday  - Appreciate documented medical optimization prior to OR  - Will need HD on Wednesday prior to OR     C Team Surgery  #[id number]

## 2023-02-07 NOTE — PROGRESS NOTE ADULT - PROBLEM SELECTOR PLAN 1
b/l heel wounds and right 2nd and 3rd toe wound.   - Xray foot negative for signs of OM  - MR foot pending   - podiatry following- concern for poor wound healing in the setting PAD- rec local wound care until revascularization   - BARRY/PVR result reviewed; mild stenosis in LLE, moderate stenosis in RLE.   - S/p RLE angio by vascular on 1/20 with angioplasty 2/3  - f/u vascular and podiatry post op recs

## 2023-02-07 NOTE — CHART NOTE - NSCHARTNOTEFT_GEN_A_CORE
ACP Medicine Night Coverage     Notified by RN, patient c/o of wanting more food despite evening meal. Patient seen and assessed with colleague at bedside, sitting up in NAD. Patient AOx4 however with no insight. Patient noted to have rumination over food. As per nurse manager, patient hoarding meals on side table and letting food rot.     Recommend Behavioral health consult in AM.     Jenae Vázquez PA-C  [id number] [facility] Medicine Night Coverage     Notified by RN, patient requesting to leave because he did not receive more food despite evening meal. Patient seen and assessed with colleague at bedside, sitting up in NAD. Patient AOx4 however with no insight. At time of encounter, patient noted to have rumination over food with increasing agitation regarding not receiving ordered meals and "meatball sub."  As per nurse manager, patient with history of hoarding meals on side table and letting food rot. Of note patient also claimed he can "walk all     Recommend Behavioral health consult in AM.     Jenae Vázquez PA-C  [id number] [facility] Medicine Night Coverage     Notified by RN, patient requesting to leave because he did not receive more food despite evening meal. Patient seen and assessed with colleague at bedside, sitting up in NAD. Patient AOx4 however with no insight. At time of encounter, patient noted to have rumination over food with increasing agitation regarding not receiving ordered meals and "meatball sub."  As per nurse manager, patient with history of hoarding meals on side table and letting food rot. Of note patient also claimed he can "walk all day without a heart" and states that the reason he has hiccups is because "someone is holding my throat."     Recommend Behavioral health consult in AM.     Jenae Vázquez PA-C  [id number] [facility] Medicine Night Coverage     Notified by RN, patient requesting to leave because he did not receive more food despite evening meal. Patient seen and assessed with colleague at bedside, sitting up in NAD. Patient AOx4 however with poor insight. At time of encounter, patient noted to have rumination over food with increasing agitation regarding not receiving ordered meals and "meatball sub."  As per nurse manager, patient with history of hoarding meals on side table and letting food rot. Of note patient also claimed he can "walk all day without a heart" and states that the reason he has hiccups is because "someone is holding my throat."     Recommend Behavioral health consult in AM.     Jenae Vázquez PA-C  [id number] [facility] Medicine Night Coverage     Notified by RN, patient requesting to leave because he did not receive more food despite evening meal. Patient seen and assessed with colleague at bedside, sitting up in NAD. Patient AOx4 however with poor insight. At time of encounter, patient noted to have rumination over food with increasing agitation regarding not receiving ordered meals and "meatball sub."  As per nurse manager, patient with history of hoarding meals on side table and letting food rot. Of note during conversation, patient also claimed he can "walk all day without a heart" and states that the reason he has hiccups is because "someone is holding my throat."     Recommend Behavioral health consult in AM.     Jenae Vázquez PA-C  [id number]

## 2023-02-07 NOTE — PROGRESS NOTE ADULT - SUBJECTIVE AND OBJECTIVE BOX
[facility] DIVISION OF KIDNEY DISEASES AND HYPERTENSION --   --------------------------------------------------------------------------------  Chief Complaint: YUNIEL on CKD/Ongoing hemodialysis requirement    24 hour events/subjective: Pt. with YUNIEL on CKD, initiated on HD on 1/28/23. Pt. seen and examined during HD today. Pt. gives history of generalized weakness and body pain. Pt. says his breathing has improved. No  fever, CP or abdominal pain during HD rounds.      PAST HISTORY  --------------------------------------------------------------------------------  No significant changes to PMH, PSH, FHx, SHx, unless otherwise noted    ALLERGIES & MEDICATIONS  --------------------------------------------------------------------------------  Allergies    Tylenol (Other)    Intolerances    Standing Inpatient Medications  albuterol/ipratropium for Nebulization. 3 milliLiter(s) Nebulizer once  cadexomer iodine 0.9% Gel 1 Application(s) Topical daily  calcitriol   Capsule 0.5 MICROGram(s) Oral daily  chlorhexidine 2% Cloths 1 Application(s) Topical two times a day  epoetin mejia-epbx (RETACRIT) Injectable 03962 Unit(s) SubCutaneous <User Schedule>  folic acid 1 milliGRAM(s) Oral daily  heparin  Infusion. 1900 Unit(s)/Hr IV Continuous <Continuous>  lidocaine   4% Patch 1 Patch Transdermal every 24 hours  metoclopramide 5 milliGRAM(s) Oral three times a day  multivitamin 1 Tablet(s) Oral daily  NIFEdipine XL 30 milliGRAM(s) Oral daily  pancrelipase  (CREON  6,000 Lipase Units) 1 Capsule(s) Oral three times a day with meals  pantoprazole    Tablet 40 milliGRAM(s) Oral every 12 hours  polyethylene glycol 3350 17 Gram(s) Oral two times a day  senna 2 Tablet(s) Oral at bedtime  sucralfate 1 Gram(s) Oral every 6 hours  thiamine 100 milliGRAM(s) Oral daily    PRN Inpatient Medications  bisacodyl 5 milliGRAM(s) Oral every 12 hours PRN  ondansetron Injectable 4 milliGRAM(s) IV Push every 6 hours PRN  oxyCODONE    IR 10 milliGRAM(s) Oral every 4 hours PRN  sodium chloride 0.9% lock flush 10 milliLiter(s) IV Push every 1 hour PRN  tiZANidine 2 milliGRAM(s) Oral every 8 hours PRN    REVIEW OF SYSTEMS  --------------------------------------------------------------------------------  Gen: See HPI, no fever  Respiratory: See HPI  CV: No chest pain  GI: No abdominal pain  MSK: No edema  Neuro: No dizziness    VITALS/PHYSICAL EXAM  --------------------------------------------------------------------------------  T(C): 36.8 (02-07-23 @ 09:45), Max: 37.4 (02-06-23 @ 15:00)  HR: 81 (02-07-23 @ 09:45) (81 - 97)  BP: 141/87 (02-07-23 @ 09:45) (122/67 - 153/83)  RR: 18 (02-07-23 @ 09:45) (17 - 18)  SpO2: 100% (02-07-23 @ 05:32) (100% - 100%)  Wt(kg): --    02-06-23 @ 07:01  -  02-07-23 @ 07:00  --------------------------------------------------------  IN: 273 mL / OUT: 275 mL / NET: -2 mL    02-07-23 @ 07:01  -  02-07-23 @ 10:47  --------------------------------------------------------  IN: 400 mL / OUT: 1400 mL / NET: -1000 mL    Physical Exam:    Gen resting  HEENT: no JVD  Pulm: CTA B/L  CV: S1S2+  Abd: Soft  Ext: Left foot dressing seen  HD access: Left IJ non-tunneled HD catheter being used for HD    LABS/STUDIES  --------------------------------------------------------------------------------              8.7    7.30  >-----------<  188      [02-07-23 @ 02:50]              29.4     137  |  107  |  46  ----------------------------<  99      [02-07-23 @ 02:50]  4.5   |  21  |  4.27        Ca     8.5     [02-07-23 @ 02:50]      Mg     1.40     [02-07-23 @ 02:50]      Phos  3.2     [02-07-23 @ 02:50]    HBsAb 10.9      [01-28-23 @ 17:41]  HBsAg Nonreact      [01-28-23 @ 17:41]  HBcAb Reactive      [01-28-23 @ 17:41]  HCV 8.31, Reactive      [01-28-23 @ 17:41]

## 2023-02-07 NOTE — PROGRESS NOTE ADULT - SUBJECTIVE AND OBJECTIVE BOX
SURGERY  Pager: #[phone]      INTERVAL EVENTS/SUBJECTIVE: No acute events overnight.     ______________________________________________  OBJECTIVE:   T(C): 37.1 (02-07-23 @ 06:30), Max: 37.4 (02-06-23 @ 15:00)  HR: 87 (02-07-23 @ 06:30) (87 - 97)  BP: 131/73 (02-07-23 @ 06:30) (122/67 - 153/83)  RR: 18 (02-07-23 @ 06:30) (17 - 18)  SpO2: 100% (02-07-23 @ 05:32) (100% - 100%)  Wt(kg): --  CAPILLARY BLOOD GLUCOSE        I&O's Detail    06 Feb 2023 07:01  -  07 Feb 2023 07:00  --------------------------------------------------------  IN:    Heparin Infusion: 273 mL  Total IN: 273 mL    OUT:    Voided (mL): 275 mL  Total OUT: 275 mL    Total NET: -2 mL          Physical exam:  General: Appears well, NAD  Neuro: AAOx3  CHEST: Clear to auscultation bilaterally  CV: Regular rate and rhythm  EXT:   RUE edematous, motor/sensation intact  LUE brachiobasilic AVF with palpable thrill, motor/sensation intact   RLE distal foot w/ Kerlix wrapping; Tegaderm dressing w/ s/s output; DP/PT signal  LLE +PT/DP signal  ______________________________________________  LABS:  CBC Full  -  ( 07 Feb 2023 02:50 )  WBC Count : 7.30 K/uL  RBC Count : 2.91 M/uL  Hemoglobin : 8.7 g/dL  Hematocrit : 29.4 %  Platelet Count - Automated : 188 K/uL  Mean Cell Volume : 101.0 fL  Mean Cell Hemoglobin : 29.9 pg  Mean Cell Hemoglobin Concentration : 29.6 gm/dL  Auto Neutrophil # : 4.60 K/uL  Auto Lymphocyte # : 1.62 K/uL  Auto Monocyte # : 0.94 K/uL  Auto Eosinophil # : 0.05 K/uL  Auto Basophil # : 0.06 K/uL  Auto Neutrophil % : 63.0 %  Auto Lymphocyte % : 22.2 %  Auto Monocyte % : 12.9 %  Auto Eosinophil % : 0.7 %  Auto Basophil % : 0.8 %    02-07    137  |  107  |  46<H>  ----------------------------<  99  4.5   |  21<L>  |  4.27<H>    Ca    8.5      07 Feb 2023 02:50  Phos  3.2     02-07  Mg     1.40     02-07      _____________________________________________  RADIOLOGY:

## 2023-02-07 NOTE — PROGRESS NOTE ADULT - ASSESSMENT
62M EtoH misuse c/b chronic pancreatitis, HCV s/p treatment (SVR), emphysema, benign esophageal stricture (s/p stent on 4/2022) admitted at [facility] w/ septic shock septic shock due to pseudomonal PNA, lung abscess, Morganella bacteremia and Klebsiella UTI (now s/p 6 wks zosyn ended 1/6) w/ hospital course c/b YUNIEL on CKD requiring HD and MICU stay, transferred here for intractable nausea/vomiting and esophageal stent removal, now s/p removal w/ EGD sig for esophagitis/gastric ulcer. Hospital course now c/b acute on chronic anemia required multiple transfusion. Now patient is noted to have skin breakdown in Right toes concerning for underline infection secondary to vascular insufficiency. Plan for repeat angiogram, and potential repeat  pop-PT bypass surgery. Course c.b by Right UE DVT, limiting IV access.

## 2023-02-07 NOTE — PROGRESS NOTE ADULT - PROBLEM SELECTOR PLAN 2
YUNIEL on CKD now progressed to ESRD, started on HD   - [facility] renal duplex with severe stenosis of the left renal artery; vascular consulted, no plan for intervention for HESHAM  - non-tunnelled triple lumen HD cath placed on 1/27  - s/p AVF w/ vascular on 1/5 to the L arm, L arm precautions. Vascular following. Planning for stage 2 intervention on thursday   - sevelamer 800 mg TID, bicarb 1300 mg TID; calcitriol increased to 0.5 for secondary hyperparathyroidism per renal  - c/w HD as per renal

## 2023-02-07 NOTE — PROGRESS NOTE ADULT - ASSESSMENT
Pt with YUNIEL on advanced CKD, initiated on HD on 1/28/23. Pt. seen during HD today. /71 at time of HD rounds.

## 2023-02-07 NOTE — PROGRESS NOTE ADULT - PROBLEM SELECTOR PLAN 9
Septic shock due to pseudomonal PNA with lung abscess, morganella bacteremia and kelbsiella UTI.  Was on Zosyn since 11/25, as per ID in [facility] who recommended 4 week course of antibiotics versus possibly longer pending imaging at 4 weeks (12/23)  - CXR 12/23 - Stable RUL lesion, with new RLL pneumonia   - seen by ID, completed 6 wks zosyn 1/6  - per ID no further imaging indicated as 6 wks abx sufficient treatment

## 2023-02-07 NOTE — PROGRESS NOTE ADULT - NSPROGADDITIONALINFOA_GEN_ALL_CORE
# Preoperative medical evaluation - patient with RCRI of 2 for CAD and ESRD with METS<4. s/p cardiac cath earlier this admission with nonobstructive CAD. Currently is without chest pain and appears euvolemic. Patient is at elevated risk for intermediate risk procedure. Currently is medically optimized, no further testing required. Can hold hepgtt prior to OR and resume per Vascular recs.  Preoperative dialysis per nephro

## 2023-02-08 NOTE — PROGRESS NOTE ADULT - SUBJECTIVE AND OBJECTIVE BOX
SURGERY  Pager: #[phone]      INTERVAL EVENTS/SUBJECTIVE: No acute events overnight.     ______________________________________________  OBJECTIVE:   T(C): 36.7 (02-08-23 @ 10:17), Max: 37 (02-07-23 @ 22:44)  HR: 89 (02-08-23 @ 10:17) (79 - 92)  BP: 140/82 (02-08-23 @ 10:17) (117/94 - 148/86)  RR: 18 (02-08-23 @ 10:17) (17 - 18)  SpO2: 96% (02-08-23 @ 10:17) (96% - 100%)  Wt(kg): --  CAPILLARY BLOOD GLUCOSE        I&O's Detail    07 Feb 2023 07:01  -  08 Feb 2023 07:00  --------------------------------------------------------  IN:    Heparin Infusion: 351 mL    Other (mL): 400 mL  Total IN: 751 mL    OUT:    Other (mL): 1400 mL  Total OUT: 1400 mL    Total NET: -649 mL        Physical exam:  General: Appears well, NAD  Neuro: AAOx3  CHEST: Clear to auscultation bilaterally  CV: Regular rate and rhythm  EXT:   RUE edematous, motor/sensation intact  LUE brachiobasilic AVF with palpable thrill, motor/sensation intact   B/l DP/PT signals   ______________________________________________  LABS:  CBC Full  -  ( 08 Feb 2023 05:53 )  WBC Count : 7.02 K/uL  RBC Count : 2.83 M/uL  Hemoglobin : 8.3 g/dL  Hematocrit : 28.1 %  Platelet Count - Automated : 196 K/uL  Mean Cell Volume : 99.3 fL  Mean Cell Hemoglobin : 29.3 pg  Mean Cell Hemoglobin Concentration : 29.5 gm/dL  Auto Neutrophil # : 3.85 K/uL  Auto Lymphocyte # : 1.88 K/uL  Auto Monocyte # : 1.21 K/uL  Auto Eosinophil # : 0.00 K/uL  Auto Basophil # : 0.05 K/uL  Auto Neutrophil % : 54.9 %  Auto Lymphocyte % : 26.8 %  Auto Monocyte % : 17.2 %  Auto Eosinophil % : 0.0 %  Auto Basophil % : 0.7 %    02-08    140  |  105  |  32<H>  ----------------------------<  81  3.6   |  24  |  3.10<H>    Ca    8.2<L>      08 Feb 2023 05:53  Phos  3.5     02-08  Mg     1.50     02-08      _____________________________________________  RADIOLOGY:

## 2023-02-08 NOTE — PROVIDER CONTACT NOTE (OTHER) - RECOMMENDATIONS
Patient educated on the importance of heparin drip and having PTT labs drawn. Patient refused labs despite education and verbalized understanding.
Please come to see patient.
notify provider
provider aware
Provider Allsion indicated she will assess patient at bedside. Pending intervention.
notify provider
ACP notified and aware, IV BP meds
Continue heparin drip @12cc/.hr via left neck non tunneled catheter. ANM aware
provider aware
provider notified
Provider notified.
notify provider
notify provider
provider aware
provider notified
notify provider
notify provider

## 2023-02-08 NOTE — PROGRESS NOTE ADULT - ASSESSMENT
62M EtoH misuse c/b chronic pancreatitis, HCV s/p treatment (SVR), emphysema, benign esophageal stricture (s/p stent on 4/2022) admitted at [facility] w/ septic shock septic shock due to pseudomonal PNA, lung abscess, Morganella bacteremia and Klebsiella UTI (now s/p 6 wks zosyn ended 1/6) w/ hospital course c/b YUNIEL on CKD requiring HD and MICU stay, transferred here for intractable nausea/vomiting and esophageal stent removal, now s/p removal w/ EGD sig for esophagitis/gastric ulcer. Hospital course c/b acute on chronic anemia requiring multiple transfusions. Also with skin breakdown in right toes secondary to vascular insufficiency s/p angioplasty. Also complicated by right UE DVT, limiting IV access. Now planned for stage 2 revision of LUE AVF.

## 2023-02-08 NOTE — PROGRESS NOTE ADULT - PROBLEM SELECTOR PLAN 12
**Pt is at the lab nowThe [employer] is calling to get an order for a stool kit as the pt is at the lab with a sample  Thank you! BP slightly elevated, intermittently refusing BP meds   - renal dopplers show left sided HESHAM 60-99%, vascular not recommending intervention   - c/w Nifedipine 30mg QD

## 2023-02-08 NOTE — PROGRESS NOTE ADULT - PROBLEM SELECTOR PLAN 1
Pt with YUNIEL on advanced CKD in the setting of recent COVID-19 and decreased oral intake. Pt was initiated on HD on 1/28/23 due to worsening kidney function with uremia. Last HD treatment was yesterday (2/7/23). UOP is not documented. Pt underwent LUE AVF creation on 1/5/23. Vascular surgery note from 2/7/23 reviewed, plan for second stage brachiobasilic AVF vein transposition on 2/9/23. Plan for HD treatment today to optimize prior to surgery tomorrow. Monitor labs and urine output. Avoid any potential nephrotoxins. Dose medications as per eGFR. Pt with YUNIEL on advanced CKD in the setting of recent COVID-19 and decreased oral intake. Pt was initiated on HD on 1/28/23 due to worsening kidney function with uremia. Last HD treatment was yesterday (2/7/23). UOP is not documented. Pt underwent LUE AVF creation on 1/5/23. Vascular surgery note from 2/7/23 reviewed, plan for second stage brachiobasilic AVF vein transposition on 2/9/23. Pt. clinically stable. Labs reviewed. Plan for HD treatment today to optimize prior to surgery tomorrow. Monitor labs and urine output. Avoid any potential nephrotoxins. Dose medications as per eGFR.

## 2023-02-08 NOTE — PROGRESS NOTE ADULT - SUBJECTIVE AND OBJECTIVE BOX
[facility] Division of Kidney Diseases & Hypertension  FOLLOW UP NOTE  [phone]--------------------------------------------------------------------------------    Chief Complaint: YUNIEL on CKD/Ongoing hemodialysis requirement    24 hour events/subjective: Pt. received HD treatment yesterday, was well tolerated. Pt. was seen and evaluated at bedside this morning. He reports diffuse body pains and generalized weakness. Denies any headaches, nausea, vomiting, fevers/chills, chest pain, palpitations, SOB, and abdominal pain.      PAST HISTORY  --------------------------------------------------------------------------------  No significant changes to PMH, PSH, FHx, SHx, unless otherwise noted    ALLERGIES & MEDICATIONS  --------------------------------------------------------------------------------  Allergies    Tylenol (Other)    Intolerances      Standing Inpatient Medications  albuterol/ipratropium for Nebulization. 3 milliLiter(s) Nebulizer once  cadexomer iodine 0.9% Gel 1 Application(s) Topical daily  calcitriol   Capsule 0.5 MICROGram(s) Oral daily  chlorhexidine 2% Cloths 1 Application(s) Topical two times a day  epoetin mejia-epbx (RETACRIT) Injectable 60857 Unit(s) SubCutaneous <User Schedule>  folic acid 1 milliGRAM(s) Oral daily  heparin  Infusion. 1900 Unit(s)/Hr IV Continuous <Continuous>  lidocaine   4% Patch 1 Patch Transdermal every 24 hours  magnesium oxide 400 milliGRAM(s) Oral three times a day with meals  metoclopramide 5 milliGRAM(s) Oral three times a day  multivitamin 1 Tablet(s) Oral daily  NIFEdipine XL 30 milliGRAM(s) Oral daily  pancrelipase  (CREON  6,000 Lipase Units) 1 Capsule(s) Oral three times a day with meals  pantoprazole    Tablet 40 milliGRAM(s) Oral every 12 hours  polyethylene glycol 3350 17 Gram(s) Oral two times a day  senna 2 Tablet(s) Oral at bedtime  sucralfate 1 Gram(s) Oral every 6 hours  thiamine 100 milliGRAM(s) Oral daily    PRN Inpatient Medications  bisacodyl 5 milliGRAM(s) Oral every 12 hours PRN  ondansetron Injectable 4 milliGRAM(s) IV Push every 6 hours PRN  oxyCODONE    IR 10 milliGRAM(s) Oral every 4 hours PRN  sodium chloride 0.9% lock flush 10 milliLiter(s) IV Push every 1 hour PRN  tiZANidine 2 milliGRAM(s) Oral every 8 hours PRN      REVIEW OF SYSTEMS  --------------------------------------------------------------------------------  Gen: See HPI, no fever  Respiratory: See HPI  CV: No chest pain  GI: No abdominal pain  MSK: No edema, +diffuse body pains  Neuro: No dizziness    VITALS/PHYSICAL EXAM  --------------------------------------------------------------------------------  T(C): 37 (02-08-23 @ 05:59), Max: 37 (02-07-23 @ 22:44)  HR: 79 (02-08-23 @ 05:59) (79 - 99)  BP: 117/94 (02-08-23 @ 05:59) (117/94 - 148/86)  RR: 18 (02-08-23 @ 05:59) (17 - 18)  SpO2: 100% (02-08-23 @ 05:59) (100% - 100%)  Wt(kg): --    Weight (kg): 65.6 (02-07-23 @ 17:58)      02-07-23 @ 07:01  -  02-08-23 @ 07:00  --------------------------------------------------------  IN: 751 mL / OUT: 1400 mL / NET: -649 mL    Physical Exam:  Gen resting  HEENT: no JVD  Pulm: CTA B/L  CV: S1S2+  Abd: Soft  Ext: Left foot dressing seen  HD access: Left IJ non-tunneled HD catheter being used for HD    LABS/STUDIES  --------------------------------------------------------------------------------              8.3    7.02  >-----------<  196      [02-08-23 @ 05:53]              28.1     140  |  105  |  32  ----------------------------<  81      [02-08-23 @ 05:53]  3.6   |  24  |  3.10        Ca     8.2     [02-08-23 @ 05:53]      Mg     1.50     [02-08-23 @ 05:53]      Phos  3.5     [02-08-23 @ 05:53]    PTT: 94.4       [02-08-23 @ 05:53]    Creatinine Trend:  SCr 3.10 [02-08 @ 05:53]  SCr 4.27 [02-07 @ 02:50]  SCr 3.53 [02-06 @ 05:20]  SCr 2.26 [02-05 @ 03:04]  SCr 3.06 [02-04 @ 07:20] [facility] Division of Kidney Diseases & Hypertension  FOLLOW UP NOTE  [phone]--------------------------------------------------------------------------------    Chief Complaint: YUNIEL on CKD/Ongoing hemodialysis requirement    24 hour events/subjective: Pt. received HD treatment yesterday, was well tolerated. Pt. was seen and evaluated at bedside this morning. He reports diffuse body pains and generalized weakness. No fever, CP, SOB, HA and abdominal pain.    PAST HISTORY  --------------------------------------------------------------------------------  No significant changes to PMH, PSH, FHx, SHx, unless otherwise noted    ALLERGIES & MEDICATIONS  --------------------------------------------------------------------------------  Allergies    Tylenol (Other)    Intolerances    Standing Inpatient Medications  albuterol/ipratropium for Nebulization. 3 milliLiter(s) Nebulizer once  cadexomer iodine 0.9% Gel 1 Application(s) Topical daily  calcitriol   Capsule 0.5 MICROGram(s) Oral daily  chlorhexidine 2% Cloths 1 Application(s) Topical two times a day  epoetin mejia-epbx (RETACRIT) Injectable 52170 Unit(s) SubCutaneous <User Schedule>  folic acid 1 milliGRAM(s) Oral daily  heparin  Infusion. 1900 Unit(s)/Hr IV Continuous <Continuous>  lidocaine   4% Patch 1 Patch Transdermal every 24 hours  magnesium oxide 400 milliGRAM(s) Oral three times a day with meals  metoclopramide 5 milliGRAM(s) Oral three times a day  multivitamin 1 Tablet(s) Oral daily  NIFEdipine XL 30 milliGRAM(s) Oral daily  pancrelipase  (CREON  6,000 Lipase Units) 1 Capsule(s) Oral three times a day with meals  pantoprazole    Tablet 40 milliGRAM(s) Oral every 12 hours  polyethylene glycol 3350 17 Gram(s) Oral two times a day  senna 2 Tablet(s) Oral at bedtime  sucralfate 1 Gram(s) Oral every 6 hours  thiamine 100 milliGRAM(s) Oral daily    PRN Inpatient Medications  bisacodyl 5 milliGRAM(s) Oral every 12 hours PRN  ondansetron Injectable 4 milliGRAM(s) IV Push every 6 hours PRN  oxyCODONE    IR 10 milliGRAM(s) Oral every 4 hours PRN  sodium chloride 0.9% lock flush 10 milliLiter(s) IV Push every 1 hour PRN  tiZANidine 2 milliGRAM(s) Oral every 8 hours PRN      REVIEW OF SYSTEMS  --------------------------------------------------------------------------------  Gen: See HPI, no fever  Respiratory: See HPI  CV: No chest pain  GI: No abdominal pain  MSK: No edema, +diffuse body pains  Neuro: No dizziness    VITALS/PHYSICAL EXAM  --------------------------------------------------------------------------------  T(C): 37 (02-08-23 @ 05:59), Max: 37 (02-07-23 @ 22:44)  HR: 79 (02-08-23 @ 05:59) (79 - 99)  BP: 117/94 (02-08-23 @ 05:59) (117/94 - 148/86)  RR: 18 (02-08-23 @ 05:59) (17 - 18)  SpO2: 100% (02-08-23 @ 05:59) (100% - 100%)  Wt(kg): --    Weight (kg): 65.6 (02-07-23 @ 17:58)    02-07-23 @ 07:01  -  02-08-23 @ 07:00  --------------------------------------------------------  IN: 751 mL / OUT: 1400 mL / NET: -649 mL    Physical Exam:  Gen resting  HEENT: no JVD  Pulm: CTA B/L  CV: S1S2+  Abd: Soft  Ext: Left foot dressing seen  HD access: Left IJ non-tunneled HD catheter being used for HD    LABS/STUDIES  --------------------------------------------------------------------------------              8.3    7.02  >-----------<  196      [02-08-23 @ 05:53]              28.1     140  |  105  |  32  ----------------------------<  81      [02-08-23 @ 05:53]  3.6   |  24  |  3.10        Ca     8.2     [02-08-23 @ 05:53]      Mg     1.50     [02-08-23 @ 05:53]      Phos  3.5     [02-08-23 @ 05:53]    Creatinine Trend:  SCr 3.10 [02-08 @ 05:53]  SCr 4.27 [02-07 @ 02:50]  SCr 3.53 [02-06 @ 05:20]  SCr 2.26 [02-05 @ 03:04]  SCr 3.06 [02-04 @ 07:20]

## 2023-02-08 NOTE — PROGRESS NOTE ADULT - PROBLEM SELECTOR PLAN 9
Septic shock due to pseudomonal PNA with lung abscess, morganella bacteremia and kelbsiella UTI.  Was on Zosyn since 11/25, as per ID in [facility] who recommended 4 week course of antibiotics versus possibly longer pending imaging at 4 weeks (12/23)  - CXR 12/23 - Stable RUL lesion, with new RLL pneumonia   - seen by ID, completed 6 wks zosyn 1/6  - per ID no further imaging indicated as 6 wks abx sufficient treatment

## 2023-02-08 NOTE — PROGRESS NOTE ADULT - ASSESSMENT
62M hx esophageal stricture, chronic pancreatitis, originally presented with n/v, esophageal stent removal in December, course c/b septic shock. Patient s/p first stage left brachiobasilic AVF on 1/5 with Dr. Oro. Vascular surgery reconsulted in setting of bilateral foot wounds and reported nonpalpable pulses. S/p RLE angio on 1/20 with PT plasty, now s/p RLE angio in OR with pedal access 2/3.     PLAN:  - Plan for second stage brachiobasilic AVF, vein transposition tomorrow  - Medical optimization noted, appreciated   - Please pre-op patient, NPOpMN, AM labs, recent COVID, active T+S     C Team Surgery  #[id number]

## 2023-02-08 NOTE — PROGRESS NOTE ADULT - PROBLEM SELECTOR PLAN 2
Pt with anemia. Hemoglobin low at 8.3 today. Pt. on EMMANUEL with HD. Monitor Hb. Pt with anemia. Hemoglobin low at 8.3 today. Pt. on EMMANUEL with HD. Monitor Hb.    If you have any questions, please feel free to contact me  Treva Mohr  Nephrology Fellow  [phone] / [employer] Teams(Preferred)  (After 5pm or on weekends please page the on-call fellow)

## 2023-02-08 NOTE — PROGRESS NOTE ADULT - PROBLEM SELECTOR PLAN 2
YUNIEL on CKD now progressed to ESRD, started on HD   - [facility] renal duplex with severe stenosis of the left renal artery; vascular consulted, no plan for intervention for HESHAM  - non-tunnelled triple lumen HD cath placed on 1/27  - s/p AVF w/ vascular on 1/5 to the L arm, L arm precautions. Vascular following. Planning for stage 2 intervention on tomorrow   - sevelamer 800 mg TID, bicarb 1300 mg TID; calcitriol increased to 0.5 for secondary hyperparathyroidism per renal  - c/w HD as per renal

## 2023-02-08 NOTE — PROGRESS NOTE ADULT - SUBJECTIVE AND OBJECTIVE BOX
Patient is a 62y old  Male who presents with a chief complaint of intractable N/V , esophageal stent removal (08 Feb 2023 09:54)    Canelo Herrera MD   [facility] Division of Hospital Medicine   Pager [phone]  Reachable on Microsoft Teams     SUBJECTIVE / OVERNIGHT EVENTS:  Patient seen and examined this morning. Some agitation overnight now calm. Patient continues to endorse unchanged abdominal pain.     MEDICATIONS  (STANDING):  albuterol/ipratropium for Nebulization. 3 milliLiter(s) Nebulizer once  cadexomer iodine 0.9% Gel 1 Application(s) Topical daily  calcitriol   Capsule 0.5 MICROGram(s) Oral daily  chlorhexidine 2% Cloths 1 Application(s) Topical two times a day  epoetin mejia-epbx (RETACRIT) Injectable 78016 Unit(s) SubCutaneous <User Schedule>  folic acid 1 milliGRAM(s) Oral daily  heparin  Infusion. 1900 Unit(s)/Hr (19 mL/Hr) IV Continuous <Continuous>  lidocaine   4% Patch 1 Patch Transdermal every 24 hours  metoclopramide 5 milliGRAM(s) Oral three times a day  multivitamin 1 Tablet(s) Oral daily  NIFEdipine XL 30 milliGRAM(s) Oral daily  pancrelipase  (CREON  6,000 Lipase Units) 1 Capsule(s) Oral three times a day with meals  pantoprazole    Tablet 40 milliGRAM(s) Oral every 12 hours  polyethylene glycol 3350 17 Gram(s) Oral two times a day  senna 2 Tablet(s) Oral at bedtime  sucralfate 1 Gram(s) Oral every 6 hours  thiamine 100 milliGRAM(s) Oral daily    MEDICATIONS  (PRN):  bisacodyl 5 milliGRAM(s) Oral every 12 hours PRN Constipation  ondansetron Injectable 4 milliGRAM(s) IV Push every 6 hours PRN Nausea and/or Vomiting  oxyCODONE    IR 10 milliGRAM(s) Oral every 4 hours PRN Severe Pain (7 - 10)  sodium chloride 0.9% lock flush 10 milliLiter(s) IV Push every 1 hour PRN Pre/post blood products, medications, blood draw, and to maintain line patency  tiZANidine 2 milliGRAM(s) Oral every 8 hours PRN Muscle Cramps      Vital Signs Last 24 Hrs  T(C): 36.7 (08 Feb 2023 10:17), Max: 37 (07 Feb 2023 22:44)  T(F): 98.1 (08 Feb 2023 10:17), Max: 98.6 (07 Feb 2023 22:44)  HR: 89 (08 Feb 2023 10:17) (79 - 92)  BP: 140/82 (08 Feb 2023 10:17) (117/94 - 148/86)  BP(mean): --  RR: 18 (08 Feb 2023 10:17) (17 - 18)  SpO2: 96% (08 Feb 2023 10:17) (96% - 100%)  CAPILLARY BLOOD GLUCOSE        I&O's Summary    07 Feb 2023 07:01  -  08 Feb 2023 07:00  --------------------------------------------------------  IN: 751 mL / OUT: 1400 mL / NET: -649 mL        General: frail/ cachectic appearing man sitting up in bed NAD  Eyes: conjunctiva clear, nonicteric sclera  Respiratory: No respiratory distress, CTABL, No rales, rhonchi, wheezing.  Cardiovascular: S1,S2; Regular rate and rhythm; No m/g/r.  Gastrointestinal: midline surgical scar - healed, soft, mild tenderness throughout the abdomen, Nondistended; +BS.   Extremities: RUE swelling, LUE fistua with palpable thrill. No c/c/e; warm to touch  Skin: No rashes, No erythema   Psych: appropriate mood and affect      LABS:                        8.3    7.02  )-----------( 196      ( 08 Feb 2023 05:53 )             28.1     02-08    140  |  105  |  32<H>  ----------------------------<  81  3.6   |  24  |  3.10<H>    Ca    8.2<L>      08 Feb 2023 05:53  Phos  3.5     02-08  Mg     1.50     02-08      PTT - ( 08 Feb 2023 05:53 )  PTT:94.4 sec          RADIOLOGY & ADDITIONAL TESTS:    Imaging Personally Reviewed:    Consultant(s) Notes Reviewed:      Care Discussed with Consultants/Other Providers:

## 2023-02-08 NOTE — PROGRESS NOTE ADULT - ATTENDING COMMENTS
Pt. with YUNIEL on CKD, currently dialysis dependent. Pt. received HD yesterday. Assessment and plan for YUNIEL on CKD/HD and anemia as outlined above. Pt. clinically stable. Labs reviewed. Plan for HD today. Monitor BP and labs.

## 2023-02-08 NOTE — PROGRESS NOTE ADULT - PROBLEM SELECTOR PLAN 1
b/l heel wounds and right 2nd and 3rd toe wound.   - Xray foot negative for signs of OM  - podiatry following- concern for poor wound healing in the setting PAD- rec local wound care until revascularization   - BARRY/PVR result reviewed; mild stenosis in LLE, moderate stenosis in RLE.   - S/p RLE angio by vascular on 1/20 with angioplasty 2/3  - f/u vascular and podiatry post op recs

## 2023-02-08 NOTE — PROVIDER CONTACT NOTE (OTHER) - ACTION/TREATMENT ORDERED:
Patient educated on importance of Heparin drip, back on heparin drip. Patient given ordered PO pain medications, still asking for IV pain meds. Provider notified, no further interventions ordered.

## 2023-02-08 NOTE — PROGRESS NOTE ADULT - PROBLEM SELECTOR PLAN 3
Pt with hypophosphatemia. Pt was receiving sevelamer, was discontinued on 1/29/23. Serum phos improved to 3.5 today. Recommend to continue to hold Sevelamer. Monitor serum phosphorus level.    If you have any questions, please feel free to contact me  Treva Mohr  Nephrology Fellow  [phone] / [employer] Teams(Preferred)  (After 5pm or on weekends please page the on-call fellow)

## 2023-02-08 NOTE — PROGRESS NOTE ADULT - PROBLEM SELECTOR PLAN 5
Chronic pancreatitis; previously on Creon, will resume given likely exocrine insufficiency with malabsorption given BMI of 17.   - w/ persistent abdominal pain   - c/w Creon, intermittently refusing, educated on importance   - c/w oxycodone 10 mg q4h prn, avoid IV pain medications if possible   - Bowel regimen while on opioids  - c/w reglan 5mg TID given mod food residue on EGD  - Pain mgt consulted; appreciate recs

## 2023-02-09 NOTE — PROGRESS NOTE ADULT - PROBLEM SELECTOR PLAN 12
BP controlled, intermittently refusing BP meds   - renal dopplers show left sided HESHAM 60-99%, vascular not recommending intervention   - c/w Nifedipine 30mg QD

## 2023-02-09 NOTE — CHART NOTE - NSCHARTNOTEFT_GEN_A_CORE
POST-OP NOTE    IMELDA ROBERTSON | [id number] | [facility]    Procedure: s/p L basilic vein transposition    Subjective: Pt feels well at bedside, is seen eating and tolerating PO. Pt denies SOB/CP/n/v, and pain is well controlled w/ current regimen.    Vital Signs Last 24 Hrs  T(C): 36.8 (09 Feb 2023 17:30), Max: 37.2 (08 Feb 2023 21:27)  T(F): 98.3 (09 Feb 2023 17:30), Max: 99 (08 Feb 2023 21:27)  HR: 89 (09 Feb 2023 17:30) (73 - 95)  BP: 123/71 (09 Feb 2023 17:30) (97/51 - 139/75)  BP(mean): 61 (09 Feb 2023 16:00) (55 - 63)  RR: 17 (09 Feb 2023 17:30) (10 - 18)  SpO2: 100% (09 Feb 2023 17:30) (97% - 100%)    Parameters below as of 09 Feb 2023 17:30  Patient On (Oxygen Delivery Method): room air      I&O's Summary    08 Feb 2023 07:01  -  09 Feb 2023 07:00  --------------------------------------------------------  IN: 400 mL / OUT: 1000 mL / NET: -600 mL                            7.8    6.31  )-----------( 199      ( 09 Feb 2023 01:09 )             25.9     02-09    141  |  106  |  20  ----------------------------<  135<H>  3.8   |  26  |  2.62<H>    Ca    8.0<L>      09 Feb 2023 01:09  Phos  2.4     02-09  Mg     1.50     02-09     PT/INR - ( 09 Feb 2023 01:09 )   PT: 12.1 sec;   INR: 1.04 ratio         PTT - ( 09 Feb 2023 01:09 )  PTT:33.0 sec    PHYSICAL EXAM:  Gen: NAD  Resp: breathing easily, no stridor  Abdomen: soft, nontender, nondistended  Extremities:  LUE seen in a sling; palpable thrill; distal radial palpable pulse  RUE radial palpable pulse      62M hx esophageal stricture, chronic pancreatitis, originally presented with n/v, esophageal stent removal in December, course c/b septic shock. Patient s/p first stage left brachiobasilic AVF on 1/5 with Dr. Oro. Vascular surgery reconsulted in setting of bilateral foot wounds and reported nonpalpable pulses. S/p RLE angio on 1/20 with PT plasty, now s/p RLE angio in OR with pedal access 2/3; now s/p L basilic vein transposition 2/9    PLAN:  - Medical optimization noted, appreciated   - AM labs done, active T+S  - will monitor post-op    C Team Surgery  #[id number]

## 2023-02-09 NOTE — PROGRESS NOTE ADULT - ASSESSMENT
62M EtoH misuse c/b chronic pancreatitis, HCV s/p treatment (SVR), emphysema, benign esophageal stricture (s/p stent on 4/2022) admitted at [facility] w/ septic shock septic shock due to pseudomonal PNA, lung abscess, Morganella bacteremia and Klebsiella UTI (now s/p 6 wks zosyn ended 1/6) w/ hospital course c/b YUNIEL on CKD requiring HD and MICU stay, transferred here for intractable nausea/vomiting and esophageal stent removal, now s/p removal w/ EGD sig for esophagitis/gastric ulcer. Hospital course c/b acute on chronic anemia requiring multiple transfusions. Also with skin breakdown in right toes secondary to vascular insufficiency s/p angioplasty. Also complicated by right UE DVT, limiting IV access. Now planned for stage 2 revision of LUE AVF.

## 2023-02-09 NOTE — CHART NOTE - NSCHARTNOTEFT_GEN_A_CORE
Reason for Follow-Up Assessment: Severe Malnutrition    62M hx esophageal stricture, chronic pancreatitis, originally presented with n/v, esophageal stent removal in December, course c/b septic shock. Patient s/p first stage left brachiobasilic AVF on 1/5 with Dr. Oro. Vascular surgery reconsulted in setting of bilateral foot wounds and reported nonpalpable pulses. S/p RLE angio on 1/20 with PT plasty, now s/p RLE angio in OR with pedal access 2/3. Plan for second stage brachiobasilic AVF, vein transposition today with Dr. Oro.       Patient NPO today for procedure.  Patient remains a poor candidate for diet education.  PO intake good prior to NPO status. Patient with h/o refusing to accept therapeutic diet restrictions and usually requests for extra portions of foods.    Source: [ ] Patient [ ] Family [ ] RN [X] Chart      Diet, NPO after Midnight:      NPO Start Date: 08-Feb-2023,   NPO Start Time: 23:59  Except Medications (02-09-23 @ 00:15)  Diet, Regular (02-03-23 @ 13:44)    PO intake:  [ ] Poor < 50%  [ ] Fair 50-75% [ X ] Good  % [ ] Inconsistent PO intake    Enteral /Parenteral Nutrition:       [ X ] n/a    Anthropometrics: Height (cm): 188 (02-03), 177.8 (11-25)  Weight (kg): 65.6 (02-07), 42.3 (11-25)  BMI (kg/m2): 18.6 (02-07), 12 (02-03), 13.4 (11-25)    Weight Hx:      2/7 - 65.6kg standing      2/3 - 56.9kg       1/31 - 55.2kg      1/20 - 55.9kg      1/11 - 55.9kg      1/5 - 59.1kg      12/16/22 - 59.1kg (dosing weights)   Some weights noted unable to be obtained (see nursing flowsheets); risk for weight fluctuations / weight loss due to fluid status / complex medical conditions    Edema: 1+ Generalized Edema, 2+ Edema to Rt arm per nursing flowsheets    Pressure Injuries: Stage II Rt buttock per nursing flowsheets    _______________ Pertinent Medications_______________  MEDICATIONS  (STANDING):  cadexomer iodine 0.9% Gel 1 Application(s) Topical daily  calcitriol   Capsule 0.5 MICROGram(s) Oral daily  chlorhexidine 2% Cloths 1 Application(s) Topical two times a day  epoetin mejia-epbx (RETACRIT) Injectable 36528 Unit(s) SubCutaneous <User Schedule>  folic acid 1 milliGRAM(s) Oral daily  lidocaine   4% Patch 1 Patch Transdermal every 24 hours  metoclopramide 5 milliGRAM(s) Oral three times a day  multivitamin 1 Tablet(s) Oral daily  NIFEdipine XL 30 milliGRAM(s) Oral daily  pancrelipase  (CREON  6,000 Lipase Units) 1 Capsule(s) Oral three times a day with meals  pantoprazole    Tablet 40 milliGRAM(s) Oral every 12 hours  polyethylene glycol 3350 17 Gram(s) Oral two times a day  senna 2 Tablet(s) Oral at bedtime  sucralfate 1 Gram(s) Oral every 6 hours  thiamine 100 milliGRAM(s) Oral daily    MEDICATIONS  (PRN):  bisacodyl 5 milliGRAM(s) Oral every 12 hours PRN Constipation  ondansetron Injectable 4 milliGRAM(s) IV Push every 6 hours PRN Nausea and/or Vomiting  oxyCODONE    IR 10 milliGRAM(s) Oral every 4 hours PRN Severe Pain (7 - 10)  sodium chloride 0.9% lock flush 10 milliLiter(s) IV Push every 1 hour PRN Pre/post blood products, medications, blood draw, and to maintain line patency  tiZANidine 2 milliGRAM(s) Oral every 8 hours PRN Muscle Cramps      __________________ Pertinent Labs__________________   02-09 Na141 mmol/L Glu 135 mg/dL<H> K+ 3.8 mmol/L Cr  2.62 mg/dL<H> BUN 20 mg/dL 02-09 Phos 2.4 mg/dL<L>    POCT Blood Glucose.: 116 mg/dL (02-07-23 @ 08:52)    Estimated Needs:   [X] no change since previous assessment  [ ] recalculated:     Previous Nutrition Diagnosis:  Increased Nutrient Needs, Severe Malnutrition, Altered Nutrition Related Labs    Nutrition Diagnosis is [ X ] ongoing  [ ] resolved [ ] not applicable     New Nutrition Diagnosis: n/a     Monitoring and Evaluation:     [ x ] Tolerance to diet prescription / adequacy of meal intake  [ x ] Weight trends   [ x ] Pertinent labs - monitor renal indices and consider therapeutic diet restriction as needed/indicated.    Recommendations:  1) Diet / Supplement Changes: Diet liberalized from renal; consider low sodium diet restriction on diet advancement, consider oral nutritional supplement if patient exhibits poor PO intake; defer diet and liquid consistencies to medical team.  2) Obtain and record current weights to best monitor for acute changes in nutritional status.  3) Please consistently document % meal intake in nursing flowsheets.    Nisha Nelson, MS, RDN, CDN  Pager [phone]  Also available on MS Teams

## 2023-02-09 NOTE — BRIEF OPERATIVE NOTE - OPERATION/FINDINGS
Left first stage brachiobasilic AVF creation. Palp thrill postop. Palp radial and ulnar pulses.
2nd stage basilic vein transposition
Anterior tibial angioplasty via pedal access

## 2023-02-09 NOTE — CONSULT NOTE ADULT - ASSESSMENT
[facility]  [address] [place] [address]    Juliana Blair  1953  [id number]       Assessment:   This 69 year old female with a past medical history of tobacco exposure as a child presents to the pulmonary clinic for evaluation after an incidental finding of 6 mm pulmonary nodule     Plan:   Pulmonary nodule;  6 mm in size.  May a risk after later estimates 7% probability of malignancy.   However, given extensive exposure to tobacco as a child will repeat a CT scan in 6-12 months    Bronchiectases;  Mild.  Focal cylindrical.  Likely a sequela of prior infection    Shortness of breath;  Though obesity/deconditioning may be playing a role.  She does have some symptoms which could be consistent with asthma    ================================================================    HPI    Juliana is a 69 year old year old female with PMH as described below who is presenting to the pulmonary clinic today for the following reason(s) or complaint(s): Office Visit, Consultation, and Nodule     The patient presents to the pulmonary clinic today after a CT for cardiac calcium scoring was performed in December.  This showed a 6 mm pulmonary nodule as well as some cylindrical bronchiectasis.     Per the patient, she does have some shortness of breath which mostly seems to be related to exertion.  However, she does say that she has had frequent episodes of bronchitis in the past.    Shortness of breath: yes; seems to be related to exertion. Going up stairs fast.     Personal history of asthma: no  Family history of asthma: no    Wheeze: yes; seems to be intermittent.   Chest tightness: yes; but, she seems to think it is related acid reflux.    Nocturnal symptoms: yes  Exacerbations: no    Inhaler regiment: None.     Cough: yes; mostly in the AM. Yellow sputum. Only in the AM.     Allergic symptoms including: yes  Postnasal drip. Primarily occurs year around. Several family members with allergies.      LUNA.      ================================================================    Eddie Amezcua    Hand pain                                                     Osteoarthritis                                                  Comment: YULISA pos, RNP Comment: Colonoscopy - GI Dr. Nilton Blackwood    Antral erythema                                 06/09/2009      Comment: EGD - GI Dr. Nilton Blackwood    Right hip pain                                  10/2014         Comment: ortho Dr. Tena     Tendinosis                                      10/2014         Comment: gluteus minimus b/l per MRI    Stenosis, spinal, lumbar                                        Comment: Dr. Beckwith    Foraminal stenosis of lumbar region                             Comment: Dr. Beckwith    Osteoarthritis of carpometacarpal joint of rig*                 Comment: Right thumb CMC joint arthritis - ortho Dr. Yehuda Waggoner, surgery recommended    Right foot pain                                                 Comment: right forefoot - plantar 1st metatarsal head.                 Dr. Waggoner [facility] and Dr. Amezcua - both no surgery                rec.    Chronic kidney disease, stage 3 (CMS/HCC)                       Comment: nephrologist Dr. Heath Mckeon    Vitamin D deficiency                                          Enuresis                                                        Comment: desmopressin helps    Osteoarthritis of carpometacarpal joints of jimmy*               Mild carpal tunnel syndrome                                     Comment: bilateral    Flexor carpi radialis tenosynovitis                             Comment: chronic    Right elbow pain                                03/2015         Comment: s/p fall - EMG R median neuropathy at the                wrist, electrically MILD, and apparently                asymptomatic. No ulnar neuropathy or cervical                radiculopathy    Post-menopausal atrophic vaginitis              08/17/2015    Osteoarthritis                                                  Comment: right hand/elbow/wrist - Dr. Yehuda Waggoner    Cervical stenosis of spine                                      Comment: Dr. Willie Beckwith    Cervical radiculopathy                                           Comment: Dr. Willie Beckwith    Cervical myelopathy (CMS/[facility])                                   Comment: Dr. Willie Beckwith    Lumbar spondylosis                                              Comment: Dr. Willie Beckwith    Lumbar stenosis                                                 Comment: Dr. Willie Beckwith    Lumbar radiculopathy                                            Comment: Dr. Willie Beckwith    Irritable bowel syndrome with diarrhea          2016    Age-related osteoporosis without current patho* 11/15/2016      Comment: New dx 2016    Wears prescription eyeglasses                                 History of sleep apnea                          PSG=05/10*      Comment: Has not used CPAP ~ 2 year, Medicare requesting               new sleep study before they pay for CPAP (per                patient)    Gastritis and duodenitis                        2017      Comment: Mild per EGD 2017. PPI was recommended by Dr. Steinberg.    Colon polyp                                     2017      Comment: Tubular adenoma 0.6cm - single    Cataract                                                      Pseudogout                                                    PONV (postoperative nausea and vomiting)                        Comment: positive for motion sickness    Motion sickness                                               Colon polyps                                    2021      Comment: 3 yr recall, tubular adenoma/ Dr. Ortega     Family History   Problem Relation Age of Onset   • Osteoarthritis Mother    • Dementia/Alzheimers Mother         alzheimers   • Heart Father         42yo MI, multi-infarct dementia  FH neg for LUNA   • High cholesterol Father    • Heart disease Father    • Stroke Father    • Osteoarthritis Brother          at 76--MI   • High cholesterol Brother    • Osteoarthritis Brother    • High cholesterol Brother    • Cancer,  evening.     • oxyCODONE, IMM REL, (ROXICODONE) 5 MG immediate release tablet Take 5 mg by mouth every 4 hours as needed for Pain.     • acetaminophen (TYLENOL) 325 MG tablet Take 650 mg by mouth every 4 hours as needed for Pain.     • TIZANIDINE HCL PO      • escitalopram (LEXAPRO) 20 MG tablet Take 1 tablet by mouth daily. 90 tablet 3   • atorvastatin (LIPITOR) 20 MG tablet Take 1 tablet by mouth daily. 30 tablet 1   • clindamycin (Cleocin) 300 MG capsule Take 2 capsules by mouth daily as needed (Dental prophylaxis). Take two tablets by mouth one hour prior to dental procedure 4 capsule 3   • desmopressin (DDAVP) 0.2 MG tablet Take 1 tablet (200 mcg total) by mouth nightly. (Patient taking differently: Takes for bladder control) 90 tablet 3   • solifenacin (VESICARE) 5 MG tablet Take 1 tablet (5 mg total) by mouth daily. 90 tablet 3   • CALCIUM PO Take 1 tablet by mouth daily.     • Cholecalciferol (Vitamin D3) 125 mcg (5,000 units) tablet Take 125 mcg by mouth daily.        No current facility-administered medications for this visit.        Labs:  I have personally reviewed and interpreted recent labs. Pertinent data is summarized below:     Imaging:  CT scan performed for calcium scoring.    Showed 6 mm nodules clustered in the right middle lobe as well as some focal cystic bronchiectasis.     Pulmonary Function Tests:  None     Polysomnography: This nocturnal polysomnogram shows mild to moderate obstructive sleep apnea in the form of increased upper airway resistance syndrome associated with moderate snoring and mild oxygen desaturations.  An additional finding of periodic limb movements of sleep is also identified.  Clinical correlation is needed for treatment options.      Echo:  None      ================================================================    Problem list:  (R91.1) Pulmonary nodule  (primary encounter diagnosis)  Plan: CT CHEST WO CONTRAST    (R06.02) Shortness of breath  Plan: PFT    (E66.09,   62 YO male with PMH remote ETOH use, chronic pancreatitis, esophagitis, HCV, CKD4, and esophogeal stricture s/p stent placed on 4/19, presents with hematemesis x 3 days.  In the ED, hemoglobin stable, hemodynamically stable.  GI was consulted.    Pt with intermittent episodes of hematemesis but hemoglobin is stable (baseline around 10).  Hemodynamically stable.    Would consider transfer to tertiary center for advanced GI given pt had esophageal stent placed there ~1 week ago.  Otherwise, may admit to medical floors given stable hemodynamics.  Trend CBC. Follow up GI.    Does not require ICU level of care at present.  Please reconsult as necessary.

## 2023-02-09 NOTE — PRE-OP CHECKLIST - 3.
pt with bilat pedal edema, left heel has small scratch, Rt foot has discoloration, healing & small open sores

## 2023-02-09 NOTE — PROGRESS NOTE ADULT - PROBLEM SELECTOR PLAN 9
Septic shock due to pseudomonal PNA with lung abscess, morganella bacteremia and kelbsiella UTI.  Was on Zosyn since 11/25, as per ID in [facility] who recommended 4 week course of antibiotics versus possibly longer pending imaging at 4 weeks (12/23)  - CXR 12/23 - Stable RUL lesion, with new RLL pneumonia   - seen by ID, completed 6 wks zosyn 1/6  - per ID no further imaging indicated as 6 wks abx sufficient treatment

## 2023-02-09 NOTE — PROGRESS NOTE ADULT - PROBLEM SELECTOR PLAN 1
YUNIEL on CKD now progressed to ESRD, started on HD   - [facility] renal duplex with severe stenosis of the left renal artery; vascular consulted, no plan for intervention for HESHAM  - non-tunnelled triple lumen HD cath placed on 1/27  - s/p AVF w/ vascular on 1/5 to the L arm, L arm precautions. Vascular following. Planning for stage 2 intervention on today  - sevelamer 800 mg TID, bicarb 1300 mg TID; calcitriol increased to 0.5 for secondary hyperparathyroidism per renal  - c/w HD as per renal

## 2023-02-09 NOTE — BRIEF OPERATIVE NOTE - NSICDXBRIEFPOSTOP_GEN_ALL_CORE_FT
POST-OP DIAGNOSIS:  PAD (peripheral artery disease) 03-Feb-2023 13:48:00  Neris Gannon  
POST-OP DIAGNOSIS:  End stage renal disease 05-Jan-2023 15:04:01  Jeremiah West  
POST-OP DIAGNOSIS:  End stage renal disease 05-Jan-2023 15:04:01  Jeremiah West

## 2023-02-09 NOTE — BRIEF OPERATIVE NOTE - NSICDXBRIEFPREOP_GEN_ALL_CORE_FT
PRE-OP DIAGNOSIS:  End stage renal disease 05-Jan-2023 15:03:38  Jeremiah West  
PRE-OP DIAGNOSIS:  PAD (peripheral artery disease) 03-Feb-2023 13:47:49  Neris Gannon  
PRE-OP DIAGNOSIS:  End stage renal disease 05-Jan-2023 15:03:38  Jeremiah West

## 2023-02-09 NOTE — PROGRESS NOTE ADULT - SUBJECTIVE AND OBJECTIVE BOX
Patient is a 62y old  Male who presents with a chief complaint of intractable N/V , esophageal stent removal (09 Feb 2023 09:10)    Canelo Herrera MD   [facility] Division of Hospital Medicine   Pager [phone]  Reachable on [employer] Teams     SUBJECTIVE / OVERNIGHT EVENTS:  Patient seen and examined this morning. States that he is hungry since he has not eaten since yesterday. .    MEDICATIONS  (STANDING):  cadexomer iodine 0.9% Gel 1 Application(s) Topical daily  calcitriol   Capsule 0.5 MICROGram(s) Oral daily  chlorhexidine 2% Cloths 1 Application(s) Topical two times a day  epoetin mejia-epbx (RETACRIT) Injectable 94756 Unit(s) SubCutaneous <User Schedule>  folic acid 1 milliGRAM(s) Oral daily  lidocaine   4% Patch 1 Patch Transdermal every 24 hours  metoclopramide 5 milliGRAM(s) Oral three times a day  multivitamin 1 Tablet(s) Oral daily  NIFEdipine XL 30 milliGRAM(s) Oral daily  pancrelipase  (CREON  6,000 Lipase Units) 1 Capsule(s) Oral three times a day with meals  pantoprazole    Tablet 40 milliGRAM(s) Oral every 12 hours  polyethylene glycol 3350 17 Gram(s) Oral two times a day  senna 2 Tablet(s) Oral at bedtime  sucralfate 1 Gram(s) Oral every 6 hours  thiamine 100 milliGRAM(s) Oral daily    MEDICATIONS  (PRN):  bisacodyl 5 milliGRAM(s) Oral every 12 hours PRN Constipation  ondansetron Injectable 4 milliGRAM(s) IV Push every 6 hours PRN Nausea and/or Vomiting  oxyCODONE    IR 10 milliGRAM(s) Oral every 4 hours PRN Severe Pain (7 - 10)  sodium chloride 0.9% lock flush 10 milliLiter(s) IV Push every 1 hour PRN Pre/post blood products, medications, blood draw, and to maintain line patency  tiZANidine 2 milliGRAM(s) Oral every 8 hours PRN Muscle Cramps      Vital Signs Last 24 Hrs  T(C): 36.9 (09 Feb 2023 10:50), Max: 37.2 (08 Feb 2023 16:45)  T(F): 98.5 (09 Feb 2023 10:50), Max: 99 (08 Feb 2023 21:27)  HR: 78 (09 Feb 2023 10:50) (78 - 98)  BP: 139/75 (09 Feb 2023 10:50) (111/64 - 139/75)  BP(mean): --  RR: 16 (09 Feb 2023 10:50) (16 - 18)  SpO2: 98% (09 Feb 2023 10:50) (98% - 100%)  CAPILLARY BLOOD GLUCOSE        I&O's Summary    08 Feb 2023 07:01  -  09 Feb 2023 07:00  --------------------------------------------------------  IN: 400 mL / OUT: 1000 mL / NET: -600 mL        General: frail/ cachectic appearing man sitting up in bed NAD  Eyes: conjunctiva clear, nonicteric sclera  Respiratory: No respiratory distress, CTABL, No rales, rhonchi, wheezing.  Cardiovascular: S1,S2; Regular rate and rhythm; No m/g/r.  Gastrointestinal: midline surgical scar - healed, soft, mild tenderness throughout the abdomen, Nondistended; +BS.   Extremities: RUE swelling, LUE fistula with palpable thrill. No c/c/e; warm to touch  Skin: No rashes, No erythema   Psych: appropriate mood and affect      LABS:                        7.8    6.31  )-----------( 199      ( 09 Feb 2023 01:09 )             25.9     02-09    141  |  106  |  20  ----------------------------<  135<H>  3.8   |  26  |  2.62<H>    Ca    8.0<L>      09 Feb 2023 01:09  Phos  2.4     02-09  Mg     1.50     02-09      PT/INR - ( 09 Feb 2023 01:09 )   PT: 12.1 sec;   INR: 1.04 ratio         PTT - ( 09 Feb 2023 01:09 )  PTT:33.0 sec          RADIOLOGY & ADDITIONAL TESTS:    Imaging Personally Reviewed:    Consultant(s) Notes Reviewed:      Care Discussed with Consultants/Other Providers:

## 2023-02-09 NOTE — PROGRESS NOTE ADULT - PROVIDER SPECIALTY LIST ADULT
[address] [facility] [phone]  [facility]    Pacemaker/Defibrillator Clinic          08/21/19        [address] [place] [id number]        Dear Marla Vigil    This letter is to inform you that we received the transmission from your monitor at home that checks your pacemaker and/or defibrillator, or implanted heart monitor. Your pacemaker shows normal function. The next date your monitor will automatically transmit will be 2/25/20. Your device and monitor are wireless and most transmit cellularly, but please periodically check your monitor is still plugged in to the electrical outlet. If you still use the telephone land line to send please ensure the connection to the phone polo is secure. This will help to ensure successful automatic transmissions in the future. Also, the monitor needs to be close to you while sleeping at night. Please be aware that the remote device transmission sites are periodically monitored only during regular business hours during which simultaneous in-office device clinics are being run. If your transmission requires attention, we will contact you as soon as possible. Thank you.             [address] Hospitalist

## 2023-02-09 NOTE — PROGRESS NOTE ADULT - ASSESSMENT
62M hx esophageal stricture, chronic pancreatitis, originally presented with n/v, esophageal stent removal in December, course c/b septic shock. Patient s/p first stage left brachiobasilic AVF on 1/5 with Dr. Oro. Vascular surgery reconsulted in setting of bilateral foot wounds and reported nonpalpable pulses. S/p RLE angio on 1/20 with PT plasty, now s/p RLE angio in OR with pedal access 2/3.     PLAN:  - Plan for second stage brachiobasilic AVF, vein transposition today with Dr. Oro   - Medical optimization noted, appreciated   - AM labs done, active T+S  - will monitor post-op    C Team Surgery  #[id number]

## 2023-02-09 NOTE — PROGRESS NOTE ADULT - PROBLEM SELECTOR PLAN 8
Hx of benign appearing esophageal stricture s/p stent on 4/2022 by Dr. Eirckson   - GI consulted now s/p stent removal 12/16, path neg h pylori, some metaplasia  - c/w Protonix 40 mg BID   - Tolerating diet  - Rest of plan as above

## 2023-02-09 NOTE — PRE-OP CHECKLIST - 1.
n/v
Pt goes to dialysis, last dialysis yesterday
vitals in asu at 9:11 am- bp 130/51, rr-18, sat on room air-100%, hr- 89, temp orally- 98.2F
Warm [place]

## 2023-02-09 NOTE — BRIEF OPERATIVE NOTE - NSICDXBRIEFPROCEDURE_GEN_ALL_CORE_FT
PROCEDURES:  Creation of arteriovenous fistula for dialysis 05-Jan-2023 15:02:29  Jeremiah West  
PROCEDURES:  Percutaneous transluminal angioplasty (PTA) of anterior tibial artery with fluoroscopic guidance 03-Feb-2023 13:47:39  Neris Gannon  
PROCEDURES:  Creation of arteriovenous fistula for dialysis 05-Jan-2023 15:02:29  Jeremiah West

## 2023-02-09 NOTE — PROGRESS NOTE ADULT - SUBJECTIVE AND OBJECTIVE BOX
SURGERY  Pager: #[phone]      INTERVAL EVENTS/SUBJECTIVE: No acute events overnight. Patient NPO for procedure today    ______________________________________________  OBJECTIVE:  ICU Vital Signs Last 24 Hrs  T(C): 36.6 (09 Feb 2023 06:10), Max: 37.2 (08 Feb 2023 16:45)  T(F): 97.9 (09 Feb 2023 06:10), Max: 99 (08 Feb 2023 21:27)  HR: 88 (09 Feb 2023 06:10) (88 - 98)  BP: 138/70 (09 Feb 2023 06:10) (111/64 - 140/82)  RR: 18 (09 Feb 2023 06:10) (17 - 18)  SpO2: 100% (09 Feb 2023 06:10) (96% - 100%)    O2 Parameters below as of 09 Feb 2023 06:10  Patient On (Oxygen Delivery Method): room air    I&O's Detail    08 Feb 2023 07:01  -  09 Feb 2023 07:00  --------------------------------------------------------  IN:    Other (mL): 400 mL  Total IN: 400 mL    OUT:    Other (mL): 900 mL    Voided (mL): 100 mL  Total OUT: 1000 mL    Total NET: -600 mL      Physical exam:  General: Appears well, NAD  Neuro: AAOx3  CHEST: no signs of respiratory distress.   CV: S1 S2. RRR.   Extremities:  RUE edematous, motor/sensation intact                     LUE brachiobasilic AVF with palpable thrill, motor/sensation intact                      B/l DP/PT signals       ______________________________________________  LABS:                          7.8    6.31  )-----------( 199      ( 09 Feb 2023 01:09 )             25.9     02-09    141  |  106  |  20  ----------------------------<  135<H>  3.8   |  26  |  2.62<H>    Ca    8.0<L>      09 Feb 2023 01:09  Phos  2.4     02-09  Mg     1.50     02-09

## 2023-02-09 NOTE — PACU DISCHARGE NOTE - NSPTMEETSDISCHCRITERIADT_GEN_A_CORE
16-Dec-2022 11:58
13-Jan-2023 18:04
03-Feb-2023 14:50
05-Jan-2023 16:55
09-Feb-2023 15:59
20-Jan-2023 16:42

## 2023-02-09 NOTE — PROGRESS NOTE ADULT - PROBLEM SELECTOR PLAN 2
b/l heel wounds and right 2nd and 3rd toe wound.   - Xray foot negative for signs of OM  - podiatry following- concern for poor wound healing in the setting PAD- rec local wound care until revascularization   - BARRY/PVR result reviewed; mild stenosis in LLE, moderate stenosis in RLE.   - S/p RLE angio by vascular on 1/20 with angioplasty 2/3  - appreciate vascular and podiatry recs

## 2023-02-09 NOTE — CHART NOTE - NSCHARTNOTEFT_GEN_A_CORE
Pt Heparin drip on hold for LUE partial thrombosis second stage brachiobasilic AVF w/ vascular surgery today. As per vascular surgery, okay to resume anticoagulation tomorrow AM as long as no hematoma or concerns for bleeding and to follow up with team tomorrow morning. As per medical attending Dr. Herrera, when cleared will plan to start Eliquis as AC. Pt Heparin drip on hold for  L-AVF 2nd stage Brachiobasilic w/ vascular surgery today. As per vascular surgery, okay to resume anticoagulation tomorrow AM as long as no hematoma or concerns for bleeding and to follow up with team tomorrow morning. As per medical attending Dr. Herrera, when cleared will plan to start Eliquis as AC.

## 2023-02-10 NOTE — PROGRESS NOTE ADULT - ASSESSMENT
62M hx esophageal stricture, chronic pancreatitis, originally presented with n/v, esophageal stent removal in December, course c/b septic shock. Patient s/p first stage left brachiobasilic AVF on 1/5 with Dr. Oro. Vascular surgery reconsulted in setting of bilateral foot wounds and reported nonpalpable pulses. S/p RLE angio on 1/20 with PT plasty, now s/p RLE angio in OR with pedal access 2/3; now s/p L basilic vein transposition 2/9    PLAN:  - Patient s/p basilic vein transposition, endorsing continued pain over tunneling site; will continue to follow and evaluate     C Team Surgery  #[id number]

## 2023-02-10 NOTE — PROGRESS NOTE ADULT - SUBJECTIVE AND OBJECTIVE BOX
SURGERY  Pager: #[phone]    INTERVAL EVENTS/SUBJECTIVE: No acute events overnight.     ______________________________________________  OBJECTIVE:   T(C): 36.7 (02-10-23 @ 05:11), Max: 36.9 (02-09-23 @ 09:44)  HR: 97 (02-10-23 @ 05:11) (73 - 97)  BP: 135/62 (02-10-23 @ 05:11) (97/51 - 139/75)  RR: 18 (02-10-23 @ 05:11) (10 - 18)  SpO2: 100% (02-10-23 @ 05:11) (97% - 100%)  Wt(kg): --  CAPILLARY BLOOD GLUCOSE        I&O's Detail      Physical exam:  Gen: resting in bed comfortably in NAD  Chest: no increased WOB  Abdomen: Soft, nontender, nondistended  Vascular: RUE edematous, motor/sensation intact                LUE brachiobasilic AVF with palpable thrill, motor/sensation intact                 B/l DP/PT signals   Neuro: awake, alert  ______________________________________________  LABS:  CBC Full  -  ( 10 Feb 2023 06:18 )  WBC Count : 6.48 K/uL  RBC Count : 2.70 M/uL  Hemoglobin : 8.2 g/dL  Hematocrit : 27.4 %  Platelet Count - Automated : 215 K/uL  Mean Cell Volume : 101.5 fL  Mean Cell Hemoglobin : 30.4 pg  Mean Cell Hemoglobin Concentration : 29.9 gm/dL  Auto Neutrophil # : x  Auto Lymphocyte # : x  Auto Monocyte # : x  Auto Eosinophil # : x  Auto Basophil # : x  Auto Neutrophil % : x  Auto Lymphocyte % : x  Auto Monocyte % : x  Auto Eosinophil % : x  Auto Basophil % : x    02-09    141  |  106  |  20  ----------------------------<  135<H>  3.8   |  26  |  2.62<H>    Ca    8.0<L>      09 Feb 2023 01:09  Phos  2.9     02-10  Mg     1.30     02-10      _____________________________________________  RADIOLOGY:

## 2023-02-10 NOTE — PROGRESS NOTE ADULT - ATTENDING COMMENTS
Pt. with YUNIEL on CKD, currently dialysis dependent. Assessment and plan for YUNIEL on CKD/HD and anemia as outlined above. Labs reviewed. Plan for HD today. Monitor BP and labs.

## 2023-02-10 NOTE — PROGRESS NOTE ADULT - ASSESSMENT
62M EtoH misuse c/b chronic pancreatitis, HCV s/p treatment (SVR), emphysema, benign esophageal stricture (s/p stent on 4/2022) admitted at [facility] w/ septic shock septic shock due to pseudomonal PNA, lung abscess, Morganella bacteremia and Klebsiella UTI (now s/p 6 wks zosyn ended 1/6) w/ hospital course c/b YUNIEL on CKD requiring HD and MICU stay, transferred here for intractable nausea/vomiting and esophageal stent removal, now s/p removal w/ EGD sig for esophagitis/gastric ulcer. Hospital course c/b acute on chronic anemia requiring multiple transfusions. Also with skin breakdown in right toes secondary to vascular insufficiency s/p angioplasty. Also complicated by right UE DVT, limiting IV access. Now planned for stage 2 revision of LUE AVF.

## 2023-02-10 NOTE — PROGRESS NOTE ADULT - PROBLEM SELECTOR PLAN 9
Septic shock due to pseudomonal PNA with lung abscess, morganella bacteremia and kelbsiella UTI.  Was on Zosyn since 11/25, as per ID in [facility] who recommended 4 week course of antibiotics versus possibly longer pending imaging at 4 weeks (12/23)  - CXR 12/23 - Stable RUL lesion, with new RLL pneumonia   - seen by ID, completed 6 wks zosyn 1/6  - per ID no further imaging indicated as 6 wks abx sufficient treatment

## 2023-02-10 NOTE — PROGRESS NOTE ADULT - SUBJECTIVE AND OBJECTIVE BOX
Patient is a 62y old  Male who presents with a chief complaint of intractable N/V , esophageal stent removal (10 Feb 2023 08:33)    Canelo Herrera MD   [facility] Division of Hospital Medicine   Pager [phone]  Reachable on [employer] Teams     SUBJECTIVE / OVERNIGHT EVENTS:  Patient seen and examined today this afternoon.   He states that he is having pain all over, however states that pain in his left arm is most bothersome.   He refused to allow me to examine the arm or even uncover it.     MEDICATIONS  (STANDING):  cadexomer iodine 0.9% Gel 1 Application(s) Topical daily  calcitriol   Capsule 0.5 MICROGram(s) Oral daily  chlorhexidine 2% Cloths 1 Application(s) Topical two times a day  epoetin mejia-epbx (RETACRIT) Injectable 74374 Unit(s) SubCutaneous <User Schedule>  folic acid 1 milliGRAM(s) Oral daily  lidocaine   4% Patch 1 Patch Transdermal every 24 hours  metoclopramide 5 milliGRAM(s) Oral three times a day  multivitamin 1 Tablet(s) Oral daily  NIFEdipine XL 30 milliGRAM(s) Oral daily  pancrelipase  (CREON  6,000 Lipase Units) 1 Capsule(s) Oral three times a day with meals  pantoprazole    Tablet 40 milliGRAM(s) Oral every 12 hours  polyethylene glycol 3350 17 Gram(s) Oral two times a day  senna 2 Tablet(s) Oral at bedtime  sucralfate 1 Gram(s) Oral every 6 hours  thiamine 100 milliGRAM(s) Oral daily    MEDICATIONS  (PRN):  bisacodyl 5 milliGRAM(s) Oral every 12 hours PRN Constipation  ondansetron Injectable 4 milliGRAM(s) IV Push every 6 hours PRN Nausea and/or Vomiting  oxyCODONE    IR 10 milliGRAM(s) Oral every 4 hours PRN Severe Pain (7 - 10)  sodium chloride 0.9% lock flush 10 milliLiter(s) IV Push every 1 hour PRN Pre/post blood products, medications, blood draw, and to maintain line patency  tiZANidine 2 milliGRAM(s) Oral every 8 hours PRN Muscle Cramps      Vital Signs Last 24 Hrs  T(C): 36.7 (10 Feb 2023 05:11), Max: 36.9 (09 Feb 2023 15:12)  T(F): 98 (10 Feb 2023 05:11), Max: 98.4 (09 Feb 2023 15:12)  HR: 97 (10 Feb 2023 05:11) (73 - 97)  BP: 135/62 (10 Feb 2023 05:11) (97/51 - 135/62)  BP(mean): 61 (09 Feb 2023 16:00) (55 - 63)  RR: 18 (10 Feb 2023 05:11) (10 - 18)  SpO2: 100% (10 Feb 2023 05:11) (97% - 100%)  CAPILLARY BLOOD GLUCOSE        I&O's Summary      General: frail/ cachectic appearing man sitting up in bed NAD  Eyes: conjunctiva clear, nonicteric sclera  Respiratory: No respiratory distress, CTABL, No rales, rhonchi, wheezing.  Cardiovascular: S1,S2; Regular rate and rhythm; No m/g/r.  Gastrointestinal: midline surgical scar - healed, soft, mild tenderness throughout the abdomen, Nondistended; +BS.   Extremities: RUE swelling, refused examination of the left arm, No c/c/e; warm to touch  Skin: No rashes, No erythema   Psych: appropriate mood and affect    LABS:                        8.2    6.48  )-----------( 215      ( 10 Feb 2023 06:18 )             27.4     02-10    138  |  105  |  35<H>  ----------------------------<  156<H>  3.9   |  23  |  3.99<H>    Ca    8.1<L>      10 Feb 2023 06:18  Phos  2.9     02-10  Mg     1.30     02-10      PT/INR - ( 09 Feb 2023 01:09 )   PT: 12.1 sec;   INR: 1.04 ratio         PTT - ( 10 Feb 2023 06:18 )  PTT:27.5 sec          RADIOLOGY & ADDITIONAL TESTS:    Imaging Personally Reviewed:    Consultant(s) Notes Reviewed:      Care Discussed with Consultants/Other Providers:

## 2023-02-10 NOTE — PROGRESS NOTE ADULT - PROBLEM SELECTOR PLAN 1
Pt with YUNIEL on advanced CKD in the setting of recent COVID-19 and decreased oral intake. Pt was initiated on HD on 1/28/23 due to worsening kidney function with uremia. Last HD treatment was yesterday (2/8/23). UOP is not documented. Pt underwent LUE AVF creation on 1/5/23 with second stage brachiobasilic AVF vein transposition on 2/9/23. Vascular surgery note from today has been reviewed. Pt. clinically stable. Labs reviewed. Plan for HD today. Monitor labs and urine output. Avoid any potential nephrotoxins. Dose medications as per eGFR. Pt with YUNIEL on advanced CKD in the setting of recent COVID-19 and decreased oral intake. Pt was initiated on HD on 1/28/23 due to worsening kidney function with uremia. Last HD treatment on 2/8/23 via L IJ non tunneled HD catheter. UOP is not documented. Pt underwent LUE AVF creation on 1/5/23 with second stage brachiobasilic AVF vein transposition on 2/9/23. Vascular surgery note from today has been reviewed. Pt. clinically stable. Labs reviewed. Plan for HD today. Monitor labs and urine output. Avoid any potential nephrotoxins. Dose medications as per eGFR. Pt with YUNIEL on advanced CKD in the setting of recent COVID-19 and decreased oral intake. Pt was initiated on HD on 1/28/23 due to worsening kidney function with uremia. Last HD treatment on 2/8/23 via left IJ non tunneled HD catheter. UOP is not documented. Pt. underwent LUE AVF creation on 1/5/23 with second stage brachiobasilic AVF vein transposition on 2/9/23. Vascular surgery note from today has been reviewed. Pt. clinically stable. Labs reviewed. Plan for HD today. Monitor labs and urine output. Avoid any potential nephrotoxins. Dose medications as per eGFR.

## 2023-02-10 NOTE — PROGRESS NOTE ADULT - PROBLEM SELECTOR PLAN 4
Diff include GIB iso PUD and medication non-adherence (intermittently refusing PPI/ Carafate) +/- anemia of chronic disease. Work up not consistent w/ hemolysis   - CT Abd pelvis w/o RP bleed  - Previous EGD 12/16/22 w/ esophagitis  - Repeat EGD 1/13 w/o active bleeding  - Colonoscopy 1/13 w/o active bleeding. W/ mucosal ulceration. Bx-Colonic mucosa with active chronic colitis  - s/p 5 units PRBC on this admission (last 1/31) cont monitor Hgb daily, transfuse to hgb>7  - c/w PPI BID  - C/w Carafate 1G Q6H  - Outpatient GI follow up w/ repeat upper endoscopy in 6-8 weeks and colonoscopy  - keep type and screen active, and transfuse for PRBC <7

## 2023-02-10 NOTE — PROGRESS NOTE ADULT - SUBJECTIVE AND OBJECTIVE BOX
[facility] DIVISION OF KIDNEY DISEASES AND HYPERTENSION   FOLLOW UP NOTE  --------------------------------------------------------------------------------  Chief Complaint: YUNIEL on CKD/Ongoing hemodialysis requirement    24 hour events/subjective: Pt. was seen and evaluated. Pt. last HD on 2/8/23 via [facility] non tunneled HD catheter. He underwent basilic vein transposition by vascular surgery on 2/9/23. He continues to report "pain all over" and admits to some discomfort near his surgical site. No fever, CP, SOB, HA and abdominal pain.    PAST HISTORY  --------------------------------------------------------------------------------  No significant changes to PMH, PSH, FHx, SHx, unless otherwise noted    ALLERGIES & MEDICATIONS  --------------------------------------------------------------------------------  Allergies  Tylenol (Other)    Standing Inpatient Medications  cadexomer iodine 0.9% Gel 1 Application(s) Topical daily  calcitriol   Capsule 0.5 MICROGram(s) Oral daily  chlorhexidine 2% Cloths 1 Application(s) Topical two times a day  epoetin mejia-epbx (RETACRIT) Injectable 67914 Unit(s) SubCutaneous <User Schedule>  folic acid 1 milliGRAM(s) Oral daily  lidocaine   4% Patch 1 Patch Transdermal every 24 hours  magnesium sulfate  IVPB 1 Gram(s) IV Intermittent once  metoclopramide 5 milliGRAM(s) Oral three times a day  multivitamin 1 Tablet(s) Oral daily  NIFEdipine XL 30 milliGRAM(s) Oral daily  pancrelipase  (CREON  6,000 Lipase Units) 1 Capsule(s) Oral three times a day with meals  pantoprazole    Tablet 40 milliGRAM(s) Oral every 12 hours  polyethylene glycol 3350 17 Gram(s) Oral two times a day  senna 2 Tablet(s) Oral at bedtime  sucralfate 1 Gram(s) Oral every 6 hours  thiamine 100 milliGRAM(s) Oral daily    PRN Inpatient Medications  bisacodyl 5 milliGRAM(s) Oral every 12 hours PRN  ondansetron Injectable 4 milliGRAM(s) IV Push every 6 hours PRN  oxyCODONE    IR 10 milliGRAM(s) Oral every 4 hours PRN  sodium chloride 0.9% lock flush 10 milliLiter(s) IV Push every 1 hour PRN  tiZANidine 2 milliGRAM(s) Oral every 8 hours PRN    REVIEW OF SYSTEMS  --------------------------------------------------------------------------------  Gen: See HPI, no fever  Respiratory: See HPI  CV: No chest pain  GI: No abdominal pain  MSK: +diffuse body pains  Neuro: No dizziness  All other systems were reviewed and are negative, except as noted.    VITALS/PHYSICAL EXAM  --------------------------------------------------------------------------------  T(C): 36.7 (02-10-23 @ 05:11), Max: 36.9 (02-09-23 @ 09:44)  HR: 97 (02-10-23 @ 05:11) (73 - 97)  BP: 135/62 (02-10-23 @ 05:11) (97/51 - 139/75)  RR: 18 (02-10-23 @ 05:11) (10 - 18)  SpO2: 100% (02-10-23 @ 05:11) (97% - 100%)  Wt(kg): --  Height (cm): 185.4 (02-09-23 @ 10:57)  Weight (kg): 57.6 (02-09-23 @ 10:57)  BMI (kg/m2): 16.8 (02-09-23 @ 10:57)  BSA (m2): 1.77 (02-09-23 @ 10:57)    Physical Exam:  Gen: resting and in NAD  HEENT: no JVD  Pulm: CTA B/L  CV: S1S2+  Abd: Soft  Ext: LUE AVF with palpable thrill. LE edema.   HD access: Left IJ non-tunneled HD catheter being used for HD    LABS/STUDIES  --------------------------------------------------------------------------------              8.2    6.48  >-----------<  215      [02-10-23 @ 06:18]              27.4     138  |  105  |  35  ----------------------------<  156      [02-10-23 @ 06:18]  3.9   |  23  |  3.99        Ca     8.1     [02-10-23 @ 06:18]      Mg     1.30     [02-10-23 @ 06:18]      Phos  2.9     [02-10-23 @ 06:18]    Creatinine Trend:  SCr 3.99 [02-10 @ 06:18]  SCr 2.62 [02-09 @ 01:09]  SCr 3.10 [02-08 @ 05:53]  SCr 4.27 [02-07 @ 02:50]  SCr 3.53 [02-06 @ 05:20]     [facility] DIVISION OF KIDNEY DISEASES AND HYPERTENSION   FOLLOW UP NOTE  --------------------------------------------------------------------------------  Chief Complaint: YUNIEL on CKD/Ongoing hemodialysis requirement    24 hour events/subjective: Pt. was seen and evaluated. Pt. last HD on 2/8/23 via [facility] non tunneled HD catheter. He underwent basilic vein transposition by vascular surgery on 2/9/23. He feels "fine" but continues to report "pain all over" and admits to some discomfort near his surgical site. No fever, CP, SOB, HA and abdominal pain.    PAST HISTORY  --------------------------------------------------------------------------------  No significant changes to PMH, PSH, FHx, SHx, unless otherwise noted    ALLERGIES & MEDICATIONS  --------------------------------------------------------------------------------  Allergies  Tylenol (Other)    Standing Inpatient Medications  cadexomer iodine 0.9% Gel 1 Application(s) Topical daily  calcitriol   Capsule 0.5 MICROGram(s) Oral daily  chlorhexidine 2% Cloths 1 Application(s) Topical two times a day  epoetin mejia-epbx (RETACRIT) Injectable 11607 Unit(s) SubCutaneous <User Schedule>  folic acid 1 milliGRAM(s) Oral daily  lidocaine   4% Patch 1 Patch Transdermal every 24 hours  magnesium sulfate  IVPB 1 Gram(s) IV Intermittent once  metoclopramide 5 milliGRAM(s) Oral three times a day  multivitamin 1 Tablet(s) Oral daily  NIFEdipine XL 30 milliGRAM(s) Oral daily  pancrelipase  (CREON  6,000 Lipase Units) 1 Capsule(s) Oral three times a day with meals  pantoprazole    Tablet 40 milliGRAM(s) Oral every 12 hours  polyethylene glycol 3350 17 Gram(s) Oral two times a day  senna 2 Tablet(s) Oral at bedtime  sucralfate 1 Gram(s) Oral every 6 hours  thiamine 100 milliGRAM(s) Oral daily    PRN Inpatient Medications  bisacodyl 5 milliGRAM(s) Oral every 12 hours PRN  ondansetron Injectable 4 milliGRAM(s) IV Push every 6 hours PRN  oxyCODONE    IR 10 milliGRAM(s) Oral every 4 hours PRN  sodium chloride 0.9% lock flush 10 milliLiter(s) IV Push every 1 hour PRN  tiZANidine 2 milliGRAM(s) Oral every 8 hours PRN    REVIEW OF SYSTEMS  --------------------------------------------------------------------------------  Gen: See HPI, no fever  Respiratory: No shortness of breath  CV: No chest pain  GI: No abdominal pain  MSK: +diffuse body pains  Neuro: No dizziness  All other systems were reviewed and are negative, except as noted.    VITALS/PHYSICAL EXAM  --------------------------------------------------------------------------------  T(C): 36.7 (02-10-23 @ 05:11), Max: 36.9 (02-09-23 @ 09:44)  HR: 97 (02-10-23 @ 05:11) (73 - 97)  BP: 135/62 (02-10-23 @ 05:11) (97/51 - 139/75)  RR: 18 (02-10-23 @ 05:11) (10 - 18)  SpO2: 100% (02-10-23 @ 05:11) (97% - 100%)  Wt(kg): --  Height (cm): 185.4 (02-09-23 @ 10:57)  Weight (kg): 57.6 (02-09-23 @ 10:57)  BMI (kg/m2): 16.8 (02-09-23 @ 10:57)  BSA (m2): 1.77 (02-09-23 @ 10:57)    Physical Exam:  Gen: resting and in NAD  HEENT: no JVD  Pulm: CTA B/L  CV: S1S2+  Abd: Soft  Ext: LUE AVF with palpable thrill. LE edema.   HD access: Left IJ non-tunneled HD catheter being used for HD    LABS/STUDIES  --------------------------------------------------------------------------------              8.2    6.48  >-----------<  215      [02-10-23 @ 06:18]              27.4     138  |  105  |  35  ----------------------------<  156      [02-10-23 @ 06:18]  3.9   |  23  |  3.99        Ca     8.1     [02-10-23 @ 06:18]      Mg     1.30     [02-10-23 @ 06:18]      Phos  2.9     [02-10-23 @ 06:18]    Creatinine Trend:  SCr 3.99 [02-10 @ 06:18]  SCr 2.62 [02-09 @ 01:09]  SCr 3.10 [02-08 @ 05:53]  SCr 4.27 [02-07 @ 02:50]  SCr 3.53 [02-06 @ 05:20]     [facility] DIVISION OF KIDNEY DISEASES AND HYPERTENSION   FOLLOW UP NOTE  --------------------------------------------------------------------------------  Chief Complaint: YUNIEL on CKD/Ongoing hemodialysis requirement    24 hour events/subjective: Pt. was seen and evaluated. Pt. received HD on 2/8/23 via [facility] non tunneled HD catheter. He underwent basilic vein transposition by vascular surgery on 2/9/23. He feels "fine" but continues to report "pain all over" and admits to some discomfort near his surgical site. No fever, CP, SOB, HA and abdominal pain.    PAST HISTORY  --------------------------------------------------------------------------------  No significant changes to PMH, PSH, FHx, SHx, unless otherwise noted    ALLERGIES & MEDICATIONS  --------------------------------------------------------------------------------  Allergies  Tylenol (Other)    Standing Inpatient Medications  cadexomer iodine 0.9% Gel 1 Application(s) Topical daily  calcitriol   Capsule 0.5 MICROGram(s) Oral daily  chlorhexidine 2% Cloths 1 Application(s) Topical two times a day  epoetin mejia-epbx (RETACRIT) Injectable 16433 Unit(s) SubCutaneous <User Schedule>  folic acid 1 milliGRAM(s) Oral daily  lidocaine   4% Patch 1 Patch Transdermal every 24 hours  magnesium sulfate  IVPB 1 Gram(s) IV Intermittent once  metoclopramide 5 milliGRAM(s) Oral three times a day  multivitamin 1 Tablet(s) Oral daily  NIFEdipine XL 30 milliGRAM(s) Oral daily  pancrelipase  (CREON  6,000 Lipase Units) 1 Capsule(s) Oral three times a day with meals  pantoprazole    Tablet 40 milliGRAM(s) Oral every 12 hours  polyethylene glycol 3350 17 Gram(s) Oral two times a day  senna 2 Tablet(s) Oral at bedtime  sucralfate 1 Gram(s) Oral every 6 hours  thiamine 100 milliGRAM(s) Oral daily    PRN Inpatient Medications  bisacodyl 5 milliGRAM(s) Oral every 12 hours PRN  ondansetron Injectable 4 milliGRAM(s) IV Push every 6 hours PRN  oxyCODONE    IR 10 milliGRAM(s) Oral every 4 hours PRN  sodium chloride 0.9% lock flush 10 milliLiter(s) IV Push every 1 hour PRN  tiZANidine 2 milliGRAM(s) Oral every 8 hours PRN    REVIEW OF SYSTEMS  --------------------------------------------------------------------------------  Gen: See HPI, no fever  Respiratory: No shortness of breath  CV: No chest pain  GI: No abdominal pain  MSK: +diffuse body pains  Neuro: No dizziness  All other systems were reviewed and are negative, except as noted.    VITALS/PHYSICAL EXAM  --------------------------------------------------------------------------------  T(C): 36.7 (02-10-23 @ 05:11), Max: 36.9 (02-09-23 @ 09:44)  HR: 97 (02-10-23 @ 05:11) (73 - 97)  BP: 135/62 (02-10-23 @ 05:11) (97/51 - 139/75)  RR: 18 (02-10-23 @ 05:11) (10 - 18)  SpO2: 100% (02-10-23 @ 05:11) (97% - 100%)  Wt(kg): --  Height (cm): 185.4 (02-09-23 @ 10:57)  Weight (kg): 57.6 (02-09-23 @ 10:57)  BMI (kg/m2): 16.8 (02-09-23 @ 10:57)  BSA (m2): 1.77 (02-09-23 @ 10:57)    Physical Exam:    Gen: resting and in NAD  HEENT: no JVD  Pulm: CTA B/L  CV: S1S2+  Abd: Soft  Ext: LUE AVF with palpable thrill. LE edema.   HD access: Left IJ non-tunneled HD catheter being used for HD    LABS/STUDIES  --------------------------------------------------------------------------------              8.2    6.48  >-----------<  215      [02-10-23 @ 06:18]              27.4     138  |  105  |  35  ----------------------------<  156      [02-10-23 @ 06:18]  3.9   |  23  |  3.99        Ca     8.1     [02-10-23 @ 06:18]      Mg     1.30     [02-10-23 @ 06:18]      Phos  2.9     [02-10-23 @ 06:18]    Creatinine Trend:  SCr 3.99 [02-10 @ 06:18]  SCr 2.62 [02-09 @ 01:09]  SCr 3.10 [02-08 @ 05:53]  SCr 4.27 [02-07 @ 02:50]  SCr 3.53 [02-06 @ 05:20]

## 2023-02-10 NOTE — PROGRESS NOTE ADULT - PROVIDER SPECIALTY LIST ADULT
Nephrology fatigue x 5 days, and fever X 2 days .  per EMS pt spo2 was 86%on RA fatigue x 5 days, and fever X 2 days .  per EMS pt spo2 was 86%on RA fatigue x 5 days, and fever X 2 days .  per EMS pt spo2 was 86%on RA fatigue x 5 days, and fever X 2 days .  per EMS pt spo2 was 86%on RA

## 2023-02-11 NOTE — PROGRESS NOTE ADULT - PROBLEM SELECTOR PLAN 2
b/l heel wounds and right 2nd and 3rd toe wound.   - Xray foot negative for signs of OM  - podiatry following- concern for poor wound healing in the setting PAD - rec local wound care until revascularization   - BARRY/PVR result reviewed; mild stenosis in LLE, moderate stenosis in RLE.   - S/p RLE angio by vascular on 1/20 with angioplasty 2/3  - appreciate vascular and podiatry recs

## 2023-02-11 NOTE — PROGRESS NOTE ADULT - ASSESSMENT
62M hx esophageal stricture, chronic pancreatitis, originally presented with n/v, esophageal stent removal in December, course c/b septic shock. Patient s/p first stage left brachiobasilic AVF on 1/5 with Dr. Oro. Vascular surgery reconsulted in setting of bilateral foot wounds and reported nonpalpable pulses. S/p RLE angio on 1/20 with PT plasty, now s/p RLE angio in OR with pedal access 2/3; now s/p L basilic vein transposition 2/9    PLAN:  - Patient s/p basilic vein transposition, endorsing continued pain over tunneling site; will continue to follow and evaluate   - Will attempt doppler over fistula today  - Continue pain control PRN    C Team Surgery  #[id number]

## 2023-02-11 NOTE — PROGRESS NOTE ADULT - PROBLEM SELECTOR PLAN 8
Hx of benign appearing esophageal stricture s/p stent on 4/2022 by Dr. Ericskon   - GI consulted now s/p stent removal 12/16, path neg h pylori, some metaplasia  - c/w Protonix 40 mg BID   - Tolerating diet  - Rest of plan as above

## 2023-02-11 NOTE — PROGRESS NOTE ADULT - PROBLEM SELECTOR PLAN 9
Septic shock due to pseudomonal PNA with lung abscess, morganella bacteremia and kelbsiella UTI.  Was on Zosyn since 11/25, as per ID in [facility] who recommended 4 week course of antibiotics versus possibly longer pending imaging at 4 weeks (12/23)  - CXR 12/23 - Stable RUL lesion, with new RLL pneumonia   - seen by ID, completed 6 wks zosyn 1/6  - per ID no further imaging indicated as 6 wks abx sufficient treatment

## 2023-02-11 NOTE — PROGRESS NOTE ADULT - SUBJECTIVE AND OBJECTIVE BOX
SURGERY  Pager: #[phone]    INTERVAL EVENTS/SUBJECTIVE: No acute events overnight.     ______________________________________________  OBJECTIVE:   T(C): 37.1 (02-11-23 @ 09:55), Max: 37.3 (02-10-23 @ 22:46)  HR: 92 (02-11-23 @ 09:55) (91 - 96)  BP: 113/73 (02-11-23 @ 09:55) (113/73 - 136/81)  RR: 17 (02-11-23 @ 09:55) (16 - 18)  SpO2: 100% (02-11-23 @ 09:55) (100% - 100%)  Wt(kg): --  CAPILLARY BLOOD GLUCOSE        I&O's Detail    10 Feb 2023 07:01  -  11 Feb 2023 07:00  --------------------------------------------------------  IN:    Oral Fluid: 240 mL    Other (mL): 900 mL  Total IN: 1140 mL    OUT:    Blood Loss (mL): 0 mL    IV PiggyBack: 0 mL    Other (mL): 1700 mL  Total OUT: 1700 mL    Total NET: -560 mL      11 Feb 2023 07:01  -  11 Feb 2023 16:47  --------------------------------------------------------  IN:    Oral Fluid: 720 mL  Total IN: 720 mL    OUT:    Blood Loss (mL): 0 mL    IV PiggyBack: 0 mL  Total OUT: 0 mL    Total NET: 720 mL          Physical exam:  Gen: resting in bed comfortably in NAD  Chest: no increased WOB, regular inspiratory effort   Abdomen: Soft, nontender, nondistended  Vascular: LUE with palpable radial pulse; site over fistula too tender to assess  Neuro: awake, alert  ______________________________________________  LABS:  CBC Full  -  ( 11 Feb 2023 06:16 )  WBC Count : 6.50 K/uL  RBC Count : 2.78 M/uL  Hemoglobin : 8.5 g/dL  Hematocrit : 28.3 %  Platelet Count - Automated : 202 K/uL  Mean Cell Volume : 101.8 fL  Mean Cell Hemoglobin : 30.6 pg  Mean Cell Hemoglobin Concentration : 30.0 gm/dL  Auto Neutrophil # : x  Auto Lymphocyte # : x  Auto Monocyte # : x  Auto Eosinophil # : x  Auto Basophil # : x  Auto Neutrophil % : x  Auto Lymphocyte % : x  Auto Monocyte % : x  Auto Eosinophil % : x  Auto Basophil % : x    02-11    140  |  107  |  27<H>  ----------------------------<  110<H>  4.1   |  23  |  4.00<H>    Ca    8.2<L>      11 Feb 2023 06:16  Phos  2.6     02-11  Mg     1.50     02-11      _____________________________________________  RADIOLOGY:

## 2023-02-11 NOTE — PROGRESS NOTE ADULT - ASSESSMENT
62M EtoH misuse c/b chronic pancreatitis, HCV s/p treatment (SVR), emphysema, benign esophageal stricture (s/p stent on 4/2022) admitted at [facility] w/ septic shock septic shock due to pseudomonal PNA, lung abscess, Morganella bacteremia and Klebsiella UTI (now s/p 6 wks zosyn ended 1/6) w/ hospital course c/b YUNIEL on CKD requiring HD and MICU stay, transferred here for intractable nausea/vomiting and esophageal stent removal, now s/p removal w/ EGD sig for esophagitis/gastric ulcer. Hospital course c/b acute on chronic anemia requiring multiple transfusions. Also with skin breakdown in right toes secondary to vascular insufficiency s/p angioplasty. Also complicated by right UE DVT, limiting IV access. Now planned for stage 2 revision of LUE AVF.

## 2023-02-11 NOTE — PROGRESS NOTE ADULT - SUBJECTIVE AND OBJECTIVE BOX
Contact Information:  Pete Brown II, MD, MPH  Chief Resident, Internal Medicine  Pager: [phone] ([facility]) /// [phone] ([facility])    IMELDA ROBERTSON, MRN-[id number]    Patient is a 62y old  Male who presents with a chief complaint of intractable N/V , esophageal stent removal (11 Feb 2023 16:47)      OVERNIGHT EVENTS/INTERVAL/SUBJECTIVE: Patient evaluated at bedside, states that he is still having some mild discomfort over his left AVF. He denies LUE numbness, tingling, CP, ABD pain, lightheadedness, dizziness, SOB, swelling.    CONSTITUTIONAL: No weakness. No fatigue. No fever.  HEAD: No head trauma.   EYES: No vision changes.  ENT: No hearing changes or tinnitus. No ear pain. No changes in smell. No nasal congestion or discharge. No sore throat. No voice hoarseness.   NECK: No neck pain or stiffness. No lumps.  RESPIRATORY: No cough. No SOB. No wheezing. No hemoptysis.   CARDIOVASCULAR: No chest pain. No palpitations.   GASTROINTESTINAL: No dysphagia. No ABD pain. No distension. No constipation. No diarrhea. No pain with defecation. No hematemesis. No hematochezia or melena.  BACK: No back pain.  GENITOURINARY: No dysuria. No frequency or urgency. No hesitancy. No incontinence. No urinary retention. No suprapubic pain. No hematuria.  EXTREMITY: +LUE pain at graft site. No swelling.  MUSCULOSKELETAL: No joint pain or swelling. No fractures. No stiffness.    SKIN: No rashes. No itching. No skin, hair, or nail changes.  NEUROLOGICAL: No weakness or paralysis. No lightheadedness or dizziness. No HA. No numbness or tingling.   PSYCHIATRIC: No depression.       OBJECTIVE:  Vital Signs Last 24 Hrs  T(C): 37.2 (11 Feb 2023 17:26), Max: 37.3 (10 Feb 2023 22:46)  T(F): 98.9 (11 Feb 2023 17:26), Max: 99.1 (10 Feb 2023 22:46)  HR: 103 (11 Feb 2023 17:26) (91 - 103)  BP: 118/65 (11 Feb 2023 17:26) (113/73 - 136/81)  BP(mean): --  RR: 18 (11 Feb 2023 17:26) (16 - 18)  SpO2: 100% (11 Feb 2023 17:26) (100% - 100%)    Parameters below as of 11 Feb 2023 09:55  Patient On (Oxygen Delivery Method): room air      I&O's Summary    10 Feb 2023 07:01  -  11 Feb 2023 07:00  --------------------------------------------------------  IN: 1140 mL / OUT: 1700 mL / NET: -560 mL    11 Feb 2023 07:01  -  11 Feb 2023 17:44  --------------------------------------------------------  IN: 720 mL / OUT: 0 mL / NET: 720 mL        MEDICATIONS  (STANDING):  cadexomer iodine 0.9% Gel 1 Application(s) Topical daily  calcitriol   Capsule 0.5 MICROGram(s) Oral daily  chlorhexidine 2% Cloths 1 Application(s) Topical two times a day  epoetin mejia-epbx (RETACRIT) Injectable 30490 Unit(s) IV Push <User Schedule>  folic acid 1 milliGRAM(s) Oral daily  lidocaine   4% Patch 1 Patch Transdermal every 24 hours  metoclopramide 5 milliGRAM(s) Oral three times a day  multivitamin 1 Tablet(s) Oral daily  NIFEdipine XL 30 milliGRAM(s) Oral daily  pancrelipase  (CREON  6,000 Lipase Units) 1 Capsule(s) Oral three times a day with meals  pantoprazole    Tablet 40 milliGRAM(s) Oral every 12 hours  polyethylene glycol 3350 17 Gram(s) Oral two times a day  senna 2 Tablet(s) Oral at bedtime  sucralfate 1 Gram(s) Oral every 6 hours  thiamine 100 milliGRAM(s) Oral daily    MEDICATIONS  (PRN):  bisacodyl 5 milliGRAM(s) Oral every 12 hours PRN Constipation  ondansetron Injectable 4 milliGRAM(s) IV Push every 6 hours PRN Nausea and/or Vomiting  oxyCODONE    IR 10 milliGRAM(s) Oral every 4 hours PRN Severe Pain (7 - 10)  sodium chloride 0.9% lock flush 10 milliLiter(s) IV Push every 1 hour PRN Pre/post blood products, medications, blood draw, and to maintain line patency  tiZANidine 2 milliGRAM(s) Oral every 8 hours PRN Muscle Cramps    Allergies    Tylenol (Other)    Intolerances        CONSTITUTIONAL: No acute distress. Awake and alert.  HEENT: Permacath in left neck. Otherwise supple without any apparent lesions.  RESPIRATORY: CTAB. No wheezes, rales, or rhonchi. No accessory muscle use. No apparent respiratory distress.  CARDIOVASCULAR: +S1/S2. No audible S3/S4. Regular rate and rhythm. No murmurs, rubs, or gallops. No LE swelling or edema.  GASTROINTESTINAL: Soft, nontender, nondistended. +BS. No rebound or guarding.   BACK: No spinal or paraspinal tenderness. No CVA tenderness.  MUSCULOSKELETAL: Spontaneous movement in all extremities. +LUE pain at AVF site.  NEUROLOGICAL: CN 2-12 grossly intact. No focal deficits. Sensation intact x 4EXT.   PSYCHIATRIC: Appropriate affect. A&Ox3 (oriented to person, place, and time).                              8.5    6.50  )-----------( 202      ( 11 Feb 2023 06:16 )             28.3     PTT - ( 10 Feb 2023 06:18 )  PTT:27.5 sec  02-11    140  |  107  |  27<H>  ----------------------------<  110<H>  4.1   |  23  |  4.00<H>    Ca    8.2<L>      11 Feb 2023 06:16  Phos  2.6     02-11  Mg     1.50     02-11      CAPILLARY BLOOD GLUCOSE                      RADIOLOGY AND ADDITIONAL TESTS:    CONSULTANT NOTES REVIEWED:    CARE DISCUSSED WITH THE FOLLOWING CONSULTANTS/PROVIDERS:

## 2023-02-11 NOTE — PROGRESS NOTE ADULT - PROBLEM SELECTOR PLAN 1
YUNIEL on CKD now progressed to ESRD, started on HD   - [facility] renal duplex with severe stenosis of the left renal artery; vascular consulted, no plan for intervention for HESHAM  - non-tunnelled triple lumen HD cath placed on 1/27  - sevelamer 800 mg TID, bicarb 1300 mg TID; calcitriol increased to 0.5 for secondary hyperparathyroidism per renal  - c/w HD as per renal  - AVF placement s/p revision on 2/9 with continued pain. Vascular surgery following, recs appreciated

## 2023-02-12 NOTE — PROGRESS NOTE ADULT - SUBJECTIVE AND OBJECTIVE BOX
SURGERY  Pager: #[phone]    INTERVAL EVENTS/SUBJECTIVE: No acute events overnight. Patient with less pain today.     ______________________________________________  OBJECTIVE:   T(C): 37.2 (02-12-23 @ 02:05), Max: 37.2 (02-11-23 @ 17:26)  HR: 91 (02-12-23 @ 02:05) (91 - 103)  BP: 120/70 (02-12-23 @ 02:05) (113/73 - 120/70)  RR: 18 (02-12-23 @ 02:05) (17 - 18)  SpO2: 100% (02-12-23 @ 02:05) (100% - 100%)  Wt(kg): --  CAPILLARY BLOOD GLUCOSE        I&O's Detail    11 Feb 2023 07:01  -  12 Feb 2023 07:00  --------------------------------------------------------  IN:    Oral Fluid: 960 mL  Total IN: 960 mL    OUT:    Blood Loss (mL): 0 mL    IV PiggyBack: 0 mL  Total OUT: 0 mL    Total NET: 960 mL          Physical exam:  Gen: resting in bed comfortably in NAD  Chest: no increased WOB, regular inspiratory effort   Abdomen: Soft, nontender, nondistended  Vascular: LUE with palpable radial pulse; site over fistula less tender, with strong doppler signal, continued swelling   Neuro: awake, alert  ______________________________________________  LABS:  CBC Full  -  ( 12 Feb 2023 07:56 )  WBC Count : 5.40 K/uL  RBC Count : 2.57 M/uL  Hemoglobin : 7.8 g/dL  Hematocrit : 25.9 %  Platelet Count - Automated : 193 K/uL  Mean Cell Volume : 100.8 fL  Mean Cell Hemoglobin : 30.4 pg  Mean Cell Hemoglobin Concentration : 30.1 gm/dL  Auto Neutrophil # : x  Auto Lymphocyte # : x  Auto Monocyte # : x  Auto Eosinophil # : x  Auto Basophil # : x  Auto Neutrophil % : x  Auto Lymphocyte % : x  Auto Monocyte % : x  Auto Eosinophil % : x  Auto Basophil % : x    02-11    140  |  107  |  27<H>  ----------------------------<  110<H>  4.1   |  23  |  4.00<H>    Ca    8.2<L>      11 Feb 2023 06:16  Phos  2.6     02-11  Mg     1.50     02-11      _____________________________________________  RADIOLOGY:

## 2023-02-12 NOTE — PROGRESS NOTE ADULT - ASSESSMENT
62M hx esophageal stricture, chronic pancreatitis, originally presented with n/v, esophageal stent removal in December, course c/b septic shock. Patient s/p first stage left brachiobasilic AVF on 1/5 with Dr. Oro. Vascular surgery reconsulted in setting of bilateral foot wounds and reported nonpalpable pulses. S/p RLE angio on 1/20 with PT plasty, now s/p RLE angio in OR with pedal access 2/3; now s/p L basilic vein transposition 2/9    PLAN:  - Patient s/p basilic vein transposition, endorsing continued pain over tunneling site, however with strong doppler signals over fistula. Will continue to follow and evaluate   - Continue pain control PRN    C Team Surgery  #[id number]

## 2023-02-12 NOTE — PROGRESS NOTE ADULT - SUBJECTIVE AND OBJECTIVE BOX
Contact Information:  Pete Brown II, MD, MPH  Chief Resident, Internal Medicine  Pager: [phone] ([facility]) /// [phone] ([facility])    IMELDA ROBERTSON, MRN-[id number]    Patient is a 62y old  Male who presents with a chief complaint of intractable N/V , esophageal stent removal (12 Feb 2023 08:08)      OVERNIGHT EVENTS/INTERVAL/SUBJECTIVE: Patient evaluated at bedside, states that he still has some residual pain in his LUE. He also states that his legs feel slightly swollen. Otherwise he denies any extremity numbness and tingling, SOB, CP, ABD pain, N/V, lightheadedness, dizziness.    CONSTITUTIONAL: No weakness. No fatigue. No fever.  HEAD: No head trauma.   EYES: No vision changes.  ENT: No hearing changes or tinnitus. No ear pain. No changes in smell. No nasal congestion or discharge. No sore throat. No voice hoarseness.   NECK: No neck pain or stiffness. No lumps.  RESPIRATORY: No cough. No SOB. No wheezing. No hemoptysis.   CARDIOVASCULAR: No chest pain. No palpitations.   GASTROINTESTINAL: No dysphagia. No ABD pain. No distension. No constipation. No diarrhea. No pain with defecation. No hematemesis. No hematochezia or melena.  BACK: No back pain.  GENITOURINARY: No dysuria. No frequency or urgency. No hesitancy. No incontinence. No urinary retention. No suprapubic pain. No hematuria.  EXTREMITY: +LUE pain. +Leg swelling.  MUSCULOSKELETAL: No joint pain or swelling. No fractures. No stiffness.    SKIN: No rashes. No itching. No skin, hair, or nail changes.  NEUROLOGICAL: No weakness or paralysis. No lightheadedness or dizziness. No HA. No numbness or tingling.   PSYCHIATRIC: No depression.       OBJECTIVE:  Vital Signs Last 24 Hrs  T(C): 37.2 (12 Feb 2023 02:05), Max: 37.2 (11 Feb 2023 17:26)  T(F): 98.9 (12 Feb 2023 02:05), Max: 98.9 (11 Feb 2023 17:26)  HR: 91 (12 Feb 2023 02:05) (91 - 103)  BP: 120/70 (12 Feb 2023 02:05) (118/65 - 120/70)  BP(mean): --  RR: 18 (12 Feb 2023 02:05) (18 - 18)  SpO2: 100% (12 Feb 2023 02:05) (100% - 100%)    Parameters below as of 12 Feb 2023 02:05  Patient On (Oxygen Delivery Method): room air      I&O's Summary    11 Feb 2023 07:01  -  12 Feb 2023 07:00  --------------------------------------------------------  IN: 960 mL / OUT: 0 mL / NET: 960 mL        MEDICATIONS  (STANDING):  cadexomer iodine 0.9% Gel 1 Application(s) Topical daily  calcitriol   Capsule 0.5 MICROGram(s) Oral daily  chlorhexidine 2% Cloths 1 Application(s) Topical two times a day  epoetin mejia-epbx (RETACRIT) Injectable 87445 Unit(s) IV Push <User Schedule>  folic acid 1 milliGRAM(s) Oral daily  lidocaine   4% Patch 1 Patch Transdermal every 24 hours  metoclopramide 5 milliGRAM(s) Oral three times a day  multivitamin 1 Tablet(s) Oral daily  NIFEdipine XL 30 milliGRAM(s) Oral daily  pancrelipase  (CREON  6,000 Lipase Units) 1 Capsule(s) Oral three times a day with meals  pantoprazole    Tablet 40 milliGRAM(s) Oral every 12 hours  polyethylene glycol 3350 17 Gram(s) Oral two times a day  senna 2 Tablet(s) Oral at bedtime  sucralfate 1 Gram(s) Oral every 6 hours  thiamine 100 milliGRAM(s) Oral daily    MEDICATIONS  (PRN):  bisacodyl 5 milliGRAM(s) Oral every 12 hours PRN Constipation  ondansetron Injectable 4 milliGRAM(s) IV Push every 6 hours PRN Nausea and/or Vomiting  oxyCODONE    IR 10 milliGRAM(s) Oral every 4 hours PRN Severe Pain (7 - 10)  sodium chloride 0.9% lock flush 10 milliLiter(s) IV Push every 1 hour PRN Pre/post blood products, medications, blood draw, and to maintain line patency  tiZANidine 2 milliGRAM(s) Oral every 8 hours PRN Muscle Cramps    Allergies    Tylenol (Other)    Intolerances        CONSTITUTIONAL: No acute distress. Awake and alert.  RESPIRATORY: CTAB. No wheezes, rales, or rhonchi. No accessory muscle use. No apparent respiratory distress.  CARDIOVASCULAR: +S1/S2. No audible S3/S4. Regular rate and rhythm. No murmurs, rubs, or gallops. Trace pitting edema b/l.  GASTROINTESTINAL: Soft, nontender, nondistended. +BS. No rebound or guarding.   BACK: No spinal or paraspinal tenderness. No CVA tenderness.  MUSCULOSKELETAL: Spontaneous movement in all extremities. LUE fistula.  NEUROLOGICAL: CN 2-12 grossly intact. No focal deficits. Sensation intact x 4EXT.   PSYCHIATRIC: Appropriate affect. A&Ox3 (oriented to person, place, and time).                              7.8    5.40  )-----------( 193      ( 12 Feb 2023 07:56 )             25.9       02-12    140  |  109<H>  |  46<H>  ----------------------------<  94  4.6   |  22  |  5.23<H>    Ca    8.2<L>      12 Feb 2023 07:56  Phos  3.6     02-12  Mg     1.80     02-12      CAPILLARY BLOOD GLUCOSE                      RADIOLOGY AND ADDITIONAL TESTS:    CONSULTANT NOTES REVIEWED:    CARE DISCUSSED WITH THE FOLLOWING CONSULTANTS/PROVIDERS:

## 2023-02-12 NOTE — PROGRESS NOTE ADULT - ASSESSMENT
62M EtoH misuse c/b chronic pancreatitis, HCV s/p treatment (SVR), emphysema, benign esophageal stricture (s/p stent on 4/2022) admitted at [facility] w/ septic shock septic shock due to pseudomonal PNA, lung abscess, Morganella bacteremia and Klebsiella UTI (now s/p 6 wks zosyn ended 1/6) w/ hospital course c/b YUNIEL on CKD requiring HD and MICU stay, transferred here for intractable nausea/vomiting and esophageal stent removal, now s/p removal w/ EGD sig for esophagitis/gastric ulcer. Hospital course c/b acute on chronic anemia requiring multiple transfusions. Also with skin breakdown in right toes secondary to vascular insufficiency s/p angioplasty. Also complicated by right UE DVT, limiting IV access s/p LUE AVF revision.

## 2023-02-12 NOTE — PROGRESS NOTE ADULT - PROBLEM SELECTOR PLAN 9
Septic shock due to pseudomonal PNA with lung abscess, morganella bacteremia and kelbsiella UTI.  Was on Zosyn since 11/25, as per ID in [facility] who recommended 4 week course of antibiotics versus possibly longer pending imaging at 4 weeks (12/23)  - CXR 12/23 - Stable RUL lesion, with new RLL pneumonia   - seen by ID, completed 6 wks zosyn 1/6  - per ID no further imaging indicated as 6 wks abx sufficient treatment

## 2023-02-12 NOTE — PROGRESS NOTE ADULT - PROBLEM SELECTOR PLAN 1
YUNIEL on CKD now progressed to ESRD, started on HD   - [facility] renal duplex with severe stenosis of the left renal artery; vascular consulted, no plan for intervention for HESHAM  - non-tunnelled triple lumen HD cath placed on 1/27  - sevelamer 800 mg TID, bicarb 1300 mg TID; calcitriol increased to 0.5 for secondary hyperparathyroidism per renal  - c/w HD as per renal  - AVF placement s/p revision on 2/9 with continued pain. Vascular surgery following, assessment yields strong doppler signals over fistula. Recs appreciated

## 2023-02-12 NOTE — PROGRESS NOTE ADULT - PROBLEM SELECTOR PLAN 3
- plan to transition to AC once cleared from surgical perspective - plan to transition to AC once cleared from surgical perspective (Discussed with Vascular 2/12, states that discussion will be had with team and recommendations to be left 2/13)

## 2023-02-13 NOTE — CHART NOTE - NSCHARTNOTEFT_GEN_A_CORE
Reason for Follow-Up Assessment: Severe Malnutrition    62M EtoH misuse c/b chronic pancreatitis, HCV s/p treatment (SVR), emphysema, benign esophageal stricture (s/p stent on 4/2022) admitted at [facility] w/ septic shock septic shock due to pseudomonal PNA, lung abscess, Morganella bacteremia and Klebsiella UTI (now s/p 6 wks zosyn ended 1/6) w/ hospital course c/b YUNIEL on CKD requiring HD and MICU stay, transferred here for intractable nausea/vomiting and esophageal stent removal, now s/p removal w/ EGD sig for esophagitis/gastric ulcer. Hospital course c/b acute on chronic anemia requiring multiple transfusions. Also with skin breakdown in right toes secondary to vascular insufficiency s/p angioplasty. Also complicated by right UE DVT, limiting IV access s/p LUE AVF revision.    Source: [ ] Patient [ ] Family [ ] RN [ ] Chart     Diet, Regular:   1000mL Fluid Restriction ([id number])  For patients receiving Renal Replacement - No Protein Restr, No Conc K, No Conc Phos, Low Sodium (RENAL)  Low Sodium  No Caffeine  Supplement Feeding Modality:  Oral  Nepro Cans or Servings Per Day:  1       Frequency:  Two Times a day (02-10-23 @ 17:51)    Nepro 1x daily (provides 425 kcals, 19.1g protein).     PO intake:  [ ] Poor < 50%  [ ] Fair 50-75% [ X ] Good  % [ ] Inconsistent PO intake    Enteral /Parenteral Nutrition:       [  ] n/a    Anthropometrics: Height (cm): 185.4 (02-09)  2/9 - 57.6kg      2/7 - 65.6kg standing      2/3 - 56.9kg       1/31 - 55.2kg      1/20 - 55.9kg      1/11 - 55.9kg      1/5 - 59.1kg      12/16/22 - 59.1kg (dosing weights)   Some weights noted unable to be obtained (see nursing flowsheets); risk for weight fluctuations / weight loss due to fluid status / complex medical conditions    Edema: 1+ Generalized Edema    Pressure Injuries: Stage II Rt buttock per nursing flowsheets    _______________ Pertinent Medications_______________  MEDICATIONS  (STANDING):  apixaban 5 milliGRAM(s) Oral every 12 hours  cadexomer iodine 0.9% Gel 1 Application(s) Topical daily  calcitriol   Capsule 0.5 MICROGram(s) Oral daily  chlorhexidine 2% Cloths 1 Application(s) Topical two times a day  epoetin mejia-epbx (RETACRIT) Injectable 83485 Unit(s) IV Push <User Schedule>  folic acid 1 milliGRAM(s) Oral daily  lidocaine   4% Patch 1 Patch Transdermal every 24 hours  metoclopramide 5 milliGRAM(s) Oral three times a day  multivitamin 1 Tablet(s) Oral daily  NIFEdipine XL 30 milliGRAM(s) Oral daily  pancrelipase  (CREON  6,000 Lipase Units) 1 Capsule(s) Oral three times a day with meals  pantoprazole    Tablet 40 milliGRAM(s) Oral every 12 hours  polyethylene glycol 3350 17 Gram(s) Oral two times a day  senna 2 Tablet(s) Oral at bedtime  sucralfate 1 Gram(s) Oral every 6 hours  thiamine 100 milliGRAM(s) Oral daily    MEDICATIONS  (PRN):  bisacodyl 5 milliGRAM(s) Oral every 12 hours PRN Constipation  ondansetron Injectable 4 milliGRAM(s) IV Push every 6 hours PRN Nausea and/or Vomiting  oxyCODONE    IR 10 milliGRAM(s) Oral every 4 hours PRN Severe Pain (7 - 10)  sodium chloride 0.9% lock flush 10 milliLiter(s) IV Push every 1 hour PRN Pre/post blood products, medications, blood draw, and to maintain line patency  tiZANidine 2 milliGRAM(s) Oral every 8 hours PRN Muscle Cramps      __________________ Pertinent Labs__________________   02-13 Na137 mmol/L Glu 112 mg/dL<H> K+ 4.3 mmol/L Cr  5.87 mg/dL<H> BUN 62 mg/dL<H> 02-13 Phos 3.8 mg/dL    Estimated Needs:   [X] no change since previous assessment  [ ] recalculated:     Previous Nutrition Diagnosis: Severe Malnutrition, Increased Nutrient Needs, Altered Nutrition Related Nabs    Nutrition Diagnosis is [X] ongoing  [ ] resolved [ ] not applicable     New Nutrition Diagnosis: n/a    Monitoring and Evaluation:     [ x ] Tolerance to diet prescription / adequacy of meal intake  [ x ] Weight trends   [ x ] Pertinent labs    Recommendations:  1) Diet / Supplement Changes: Continue diet and supplement as ordered.  2) Obtain and record current weights to best monitor for acute changes in nutritional status.  3) Please consistently document % meal intake in nursing flowsheets.     Nisha Nelson MS, RDN, CDN  Pager [phone]  Also available on MS Teams Reason for Follow-Up Assessment: Severe Malnutrition    62M EtoH misuse c/b chronic pancreatitis, HCV s/p treatment (SVR), emphysema, benign esophageal stricture (s/p stent on 4/2022) admitted at [facility] w/ septic shock septic shock due to pseudomonal PNA, lung abscess, Morganella bacteremia and Klebsiella UTI (now s/p 6 wks zosyn ended 1/6) w/ hospital course c/b YUNIEL on CKD requiring HD and MICU stay, transferred here for intractable nausea/vomiting and esophageal stent removal, now s/p removal w/ EGD sig for esophagitis/gastric ulcer. Hospital course c/b acute on chronic anemia requiring multiple transfusions. Also with skin breakdown in right toes secondary to vascular insufficiency s/p angioplasty. Also complicated by right UE DVT, limiting IV access s/p LUE AVF revision.    Patient with good appetite / good PO intake. Eating at time of visit. No reported chewing/swallowing difficulties.  No GI distress reported i.e. nausea, vomiting, diarrhea. Patient remains a poor candidate for diet education.      Source: [ X ] Patient [ ] Family [ X ] RN [ X ] Chart     Diet, Regular:   1000mL Fluid Restriction ([id number])  For patients receiving Renal Replacement - No Protein Restr, No Conc K, No Conc Phos, Low Sodium (RENAL)  Low Sodium  No Caffeine  Supplement Feeding Modality:  Oral  Nepro Cans or Servings Per Day:  1       Frequency:  Two Times a day (02-10-23 @ 17:51)    Nepro 1x daily (provides 425 kcals, 19.1g protein).     PO intake:  [ ] Poor < 50%  [ ] Fair 50-75% [ X ] Good  % [ ] Inconsistent PO intake    Enteral /Parenteral Nutrition:       [  ] n/a    Anthropometrics: Height (cm): 185.4 (02-09)  2/9 - 57.6kg      2/7 - 65.6kg standing      2/3 - 56.9kg       1/31 - 55.2kg      1/20 - 55.9kg      1/11 - 55.9kg      1/5 - 59.1kg      12/16/22 - 59.1kg (dosing weights)   Some weights noted unable to be obtained (see nursing flowsheets); risk for weight fluctuations / weight loss due to fluid status / complex medical conditions    Edema: 1+ Generalized Edema    Pressure Injuries: Stage II Rt buttock per nursing flowsheets    _______________ Pertinent Medications_______________  MEDICATIONS  (STANDING):  apixaban 5 milliGRAM(s) Oral every 12 hours  cadexomer iodine 0.9% Gel 1 Application(s) Topical daily  calcitriol   Capsule 0.5 MICROGram(s) Oral daily  chlorhexidine 2% Cloths 1 Application(s) Topical two times a day  epoetin mejia-epbx (RETACRIT) Injectable 78491 Unit(s) IV Push <User Schedule>  folic acid 1 milliGRAM(s) Oral daily  lidocaine   4% Patch 1 Patch Transdermal every 24 hours  metoclopramide 5 milliGRAM(s) Oral three times a day  multivitamin 1 Tablet(s) Oral daily  NIFEdipine XL 30 milliGRAM(s) Oral daily  pancrelipase  (CREON  6,000 Lipase Units) 1 Capsule(s) Oral three times a day with meals  pantoprazole    Tablet 40 milliGRAM(s) Oral every 12 hours  polyethylene glycol 3350 17 Gram(s) Oral two times a day  senna 2 Tablet(s) Oral at bedtime  sucralfate 1 Gram(s) Oral every 6 hours  thiamine 100 milliGRAM(s) Oral daily    MEDICATIONS  (PRN):  bisacodyl 5 milliGRAM(s) Oral every 12 hours PRN Constipation  ondansetron Injectable 4 milliGRAM(s) IV Push every 6 hours PRN Nausea and/or Vomiting  oxyCODONE    IR 10 milliGRAM(s) Oral every 4 hours PRN Severe Pain (7 - 10)  sodium chloride 0.9% lock flush 10 milliLiter(s) IV Push every 1 hour PRN Pre/post blood products, medications, blood draw, and to maintain line patency  tiZANidine 2 milliGRAM(s) Oral every 8 hours PRN Muscle Cramps      __________________ Pertinent Labs__________________   02-13 Na137 mmol/L Glu 112 mg/dL<H> K+ 4.3 mmol/L Cr  5.87 mg/dL<H> BUN 62 mg/dL<H> 02-13 Phos 3.8 mg/dL    Estimated Needs:   [X] no change since previous assessment  [ ] recalculated:     Previous Nutrition Diagnosis: Severe Malnutrition, Increased Nutrient Needs, Altered Nutrition Related Nabs    Nutrition Diagnosis is [X] ongoing  [ ] resolved [ ] not applicable     New Nutrition Diagnosis: n/a    Monitoring and Evaluation:     [ x ] Tolerance to diet prescription / adequacy of meal intake  [ x ] Weight trends   [ x ] Pertinent labs    Recommendations:  1) Diet / Supplement Changes: Continue diet and supplement as ordered.  2) Obtain and record current weights to best monitor for acute changes in nutritional status.  3) Please consistently document % meal intake in nursing flowsheets.     Nisha Nelson, MS, RDN, CDN  Pager [phone]  Also available on MS Teams

## 2023-02-13 NOTE — PROGRESS NOTE ADULT - ASSESSMENT
62M hx esophageal stricture, chronic pancreatitis, originally presented with n/v, esophageal stent removal in December, course c/b septic shock. Patient s/p first stage left brachiobasilic AVF on 1/5 with Dr. Oro. Vascular surgery reconsulted in setting of bilateral foot wounds and reported nonpalpable pulses. S/p RLE angio on 1/20 with PT plasty, now s/p RLE angio in OR with pedal access 2/3; now s/p L basilic vein transposition 2/9    Plan:  - Patient s/p basilic vein transposition, endorsing continued pain over tunneling site, however with strong doppler signals over fistula. Will continue to follow and evaluate   - Continue pain control PRN      Vascular (C Team) Surgery  [id number]

## 2023-02-13 NOTE — PROGRESS NOTE ADULT - SUBJECTIVE AND OBJECTIVE BOX
[facility] Division of Kidney Diseases & Hypertension  FOLLOW UP NOTE  --------------------------------------------------------------------------------    Chief Complaint: YUNIEL on CKD/Ongoing hemodialysis requirement    24 hour events/subjective: Pt. was seen and evaluated during HD treatment today reports feeling well other than his continued abdominal pain and some right arm pain.  tolerating treatment blood pressure stable.    PAST HISTORY  --------------------------------------------------------------------------------  No significant changes to PMH, PSH, FHx, SHx, unless otherwise noted    ALLERGIES & MEDICATIONS  --------------------------------------------------------------------------------  Allergies    Tylenol (Other)    Intolerances      Standing Inpatient Medications  apixaban 5 milliGRAM(s) Oral every 12 hours  cadexomer iodine 0.9% Gel 1 Application(s) Topical daily  calcitriol   Capsule 0.5 MICROGram(s) Oral daily  chlorhexidine 2% Cloths 1 Application(s) Topical two times a day  epoetin mejia-epbx (RETACRIT) Injectable 03188 Unit(s) IV Push <User Schedule>  folic acid 1 milliGRAM(s) Oral daily  lidocaine   4% Patch 1 Patch Transdermal every 24 hours  metoclopramide 5 milliGRAM(s) Oral three times a day  multivitamin 1 Tablet(s) Oral daily  NIFEdipine XL 30 milliGRAM(s) Oral daily  pancrelipase  (CREON  6,000 Lipase Units) 1 Capsule(s) Oral three times a day with meals  pantoprazole    Tablet 40 milliGRAM(s) Oral every 12 hours  polyethylene glycol 3350 17 Gram(s) Oral two times a day  senna 2 Tablet(s) Oral at bedtime  sucralfate 1 Gram(s) Oral every 6 hours  thiamine 100 milliGRAM(s) Oral daily    PRN Inpatient Medications  bisacodyl 5 milliGRAM(s) Oral every 12 hours PRN  ondansetron Injectable 4 milliGRAM(s) IV Push every 6 hours PRN  oxyCODONE    IR 10 milliGRAM(s) Oral every 4 hours PRN  sodium chloride 0.9% lock flush 10 milliLiter(s) IV Push every 1 hour PRN  tiZANidine 2 milliGRAM(s) Oral every 8 hours PRN      REVIEW OF SYSTEMS  --------------------------------------------------------------------------------  Gen: no fever  Respiratory: no sob  CV: no cp  GI: +ab pain  : no complaints  MSK: has some pain on right arm    VITALS/PHYSICAL EXAM  --------------------------------------------------------------------------------  T(C): 36.9 (02-13-23 @ 09:42), Max: 37 (02-13-23 @ 06:00)  HR: 76 (02-13-23 @ 09:42) (76 - 104)  BP: 140/70 (02-13-23 @ 09:42) (125/77 - 148/83)  ABP: --  ABP(mean): --  RR: 16 (02-13-23 @ 09:42) (16 - 19)  SpO2: 100% (02-13-23 @ 06:00) (100% - 100%)  CVP(mm Hg): --        02-12-23 @ 07:01  -  02-13-23 @ 07:00  --------------------------------------------------------  IN: 0 mL / OUT: 450 mL / NET: -450 mL    02-13-23 @ 07:01  -  02-13-23 @ 10:00  --------------------------------------------------------  IN: 500 mL / OUT: 1100 mL / NET: -600 mL      Physical Exam:    Gen: resting and in NAD  HEENT: no JVD  Pulm: CTA B/L  CV: S1S2+  Abd: Soft  Ext: LUE AVF with palpable thrill. LE edema.   HD access: Left IJ non-tunneled HD catheter being used for HD    LABS/STUDIES  --------------------------------------------------------------------------------              8.1    6.95  >-----------<  222      [02-13-23 @ 06:45]              27.1     137  |  107  |  62  ----------------------------<  112      [02-13-23 @ 06:45]  4.3   |  20  |  5.87        Ca     8.1     [02-13-23 @ 06:45]      Mg     1.70     [02-13-23 @ 06:45]      Phos  3.8     [02-13-23 @ 06:45]            Creatinine Trend:  SCr 5.87 [02-13 @ 06:45]  SCr 5.23 [02-12 @ 07:56]  SCr 4.00 [02-11 @ 06:16]  SCr 3.99 [02-10 @ 06:18]  SCr 2.62 [02-09 @ 01:09]

## 2023-02-13 NOTE — PROGRESS NOTE ADULT - SUBJECTIVE AND OBJECTIVE BOX
Patient is a 62y old  Male who presents with a chief complaint of intractable N/V , esophageal stent removal (13 Feb 2023 10:00)    Canelo Herrera MD   [facility] Division of Hospital Medicine   Pager [phone]  Reachable on Microsoft Teams     SUBJECTIVE / OVERNIGHT EVENTS:  Patient seen and examined this morning. Continues to states he is having pain all over, did state that pain in his right arm is improved since procedure.     MEDICATIONS  (STANDING):  apixaban 5 milliGRAM(s) Oral every 12 hours  cadexomer iodine 0.9% Gel 1 Application(s) Topical daily  calcitriol   Capsule 0.5 MICROGram(s) Oral daily  chlorhexidine 2% Cloths 1 Application(s) Topical two times a day  epoetin mejia-epbx (RETACRIT) Injectable 12818 Unit(s) IV Push <User Schedule>  folic acid 1 milliGRAM(s) Oral daily  lidocaine   4% Patch 1 Patch Transdermal every 24 hours  metoclopramide 5 milliGRAM(s) Oral three times a day  multivitamin 1 Tablet(s) Oral daily  NIFEdipine XL 30 milliGRAM(s) Oral daily  pancrelipase  (CREON  6,000 Lipase Units) 1 Capsule(s) Oral three times a day with meals  pantoprazole    Tablet 40 milliGRAM(s) Oral every 12 hours  polyethylene glycol 3350 17 Gram(s) Oral two times a day  senna 2 Tablet(s) Oral at bedtime  sucralfate 1 Gram(s) Oral every 6 hours  thiamine 100 milliGRAM(s) Oral daily    MEDICATIONS  (PRN):  bisacodyl 5 milliGRAM(s) Oral every 12 hours PRN Constipation  ondansetron Injectable 4 milliGRAM(s) IV Push every 6 hours PRN Nausea and/or Vomiting  oxyCODONE    IR 10 milliGRAM(s) Oral every 4 hours PRN Severe Pain (7 - 10)  sodium chloride 0.9% lock flush 10 milliLiter(s) IV Push every 1 hour PRN Pre/post blood products, medications, blood draw, and to maintain line patency  tiZANidine 2 milliGRAM(s) Oral every 8 hours PRN Muscle Cramps      Vital Signs Last 24 Hrs  T(C): 36.9 (13 Feb 2023 11:07), Max: 37 (13 Feb 2023 06:00)  T(F): 98.4 (13 Feb 2023 11:07), Max: 98.6 (13 Feb 2023 06:00)  HR: 99 (13 Feb 2023 11:07) (76 - 104)  BP: 147/79 (13 Feb 2023 11:07) (125/77 - 148/83)  BP(mean): --  RR: 18 (13 Feb 2023 11:07) (16 - 19)  SpO2: 98% (13 Feb 2023 11:07) (98% - 100%)  CAPILLARY BLOOD GLUCOSE        I&O's Summary    12 Feb 2023 07:01  -  13 Feb 2023 07:00  --------------------------------------------------------  IN: 0 mL / OUT: 450 mL / NET: -450 mL    13 Feb 2023 07:01  -  13 Feb 2023 13:21  --------------------------------------------------------  IN: 500 mL / OUT: 1100 mL / NET: -600 mL        General: frail/ cachectic appearing man sitting up in bed NAD  Eyes: conjunctiva clear, nonicteric sclera  Respiratory: No respiratory distress, CTABL, No rales, rhonchi, wheezing.  Cardiovascular: S1,S2; Regular rate and rhythm; No m/g/r.  Gastrointestinal: midline surgical scar - healed, soft, mild tenderness throughout the abdomen, Nondistended; +BS.   Extremities: BUE swelling, moderate tenderness to right arm, No c/c/e; warm to touch  Skin: No rashes, No erythema   Psych: appropriate mood and affect    LABS:                        8.1    6.95  )-----------( 222      ( 13 Feb 2023 06:45 )             27.1     02-13    137  |  107  |  62<H>  ----------------------------<  112<H>  4.3   |  20<L>  |  5.87<H>    Ca    8.1<L>      13 Feb 2023 06:45  Phos  3.8     02-13  Mg     1.70     02-13                RADIOLOGY & ADDITIONAL TESTS:    Imaging Personally Reviewed:    Consultant(s) Notes Reviewed:      Care Discussed with Consultants/Other Providers: Vascular resident - regarding AC

## 2023-02-13 NOTE — PROGRESS NOTE ADULT - PROBLEM SELECTOR PLAN 12
BP controlled, intermittently refusing BP meds   - renal dopplers show left sided HESHAM 60-99%, vascular not recommending intervention given normal BPs and ESRD  - c/w Nifedipine 30mg QD

## 2023-02-13 NOTE — PROGRESS NOTE ADULT - PROBLEM SELECTOR PLAN 7
Intermittent CP; trops elevated on admission 210->199, also elevated at OSH; no DM, LDL 71 9/2022  - EKG with T wave flattening in inferolateral leads, consistent with prior study   - TTE showing new mild global LV dysfunction   - NST positive with inferior wall motion abnormality  - cath on 1/4 nonobstructive CAD, no intervention  - should be on ace/arb however contraindicated in setting of CKD5 not on HD at risk for hyperkalemia also not taking any meds that are prescribed despite encouragement

## 2023-02-13 NOTE — PROGRESS NOTE ADULT - ASSESSMENT
62M EtoH misuse c/b chronic pancreatitis, HCV s/p treatment (SVR), emphysema, benign esophageal stricture (s/p stent on 4/2022) admitted at [facility] w/ septic shock septic shock due to pseudomonal PNA, lung abscess, Morganella bacteremia and Klebsiella UTI (now s/p 6 wks zosyn ended 1/6) w/ hospital course c/b YUNIEL on CKD requiring HD and MICU stay, transferred here for intractable nausea/vomiting and esophageal stent removal, now s/p removal w/ EGD sig for esophagitis/gastric ulcer. Kidney function declined with progression to ESRD, s/p permacath placement and AVF placement with stage 2 revision completed. Hospital course c/b acute on chronic anemia requiring multiple transfusions. Also with skin breakdown in right toes secondary to vascular insufficiency s/p angioplasty with vascular. Further complicated by right UE DVT with limiting IV access now on eliquis.

## 2023-02-13 NOTE — PROGRESS NOTE ADULT - PROBLEM SELECTOR PLAN 3
- plan to transition to AC once cleared from surgical perspective (Discussed with Vascular 2/12, states that discussion will be had with team and recommendations to be left 2/13)

## 2023-02-13 NOTE — PROGRESS NOTE ADULT - SUBJECTIVE AND OBJECTIVE BOX
Vascular Surgery Progress Note    Subjective/24hour Events:   Patient seen and examined. Pain still present, states it is improving.      Vital Signs:  Vital Signs Last 24 Hrs  T(C): 37 (13 Feb 2023 06:15), Max: 37 (13 Feb 2023 06:15)  T(F): 98.6 (13 Feb 2023 06:15), Max: 98.6 (13 Feb 2023 06:15)  HR: 94 (13 Feb 2023 06:15) (88 - 104)  BP: 125/77 (13 Feb 2023 06:15) (117/65 - 148/83)  BP(mean): --  RR: 16 (13 Feb 2023 06:15) (16 - 19)  SpO2: 100% (12 Feb 2023 22:00) (100% - 100%)    Parameters below as of 13 Feb 2023 06:15  Patient On (Oxygen Delivery Method): room air        CAPILLARY BLOOD GLUCOSE          I&O's Detail    12 Feb 2023 07:01  -  13 Feb 2023 07:00  --------------------------------------------------------  IN:  Total IN: 0 mL    OUT:    Voided (mL): 450 mL  Total OUT: 450 mL    Total NET: -450 mL          Physical Exam:  Gen: NAD  CV: Regular rate  Resp: Nonlabored breathing on room air  Ext: LUE with continued swelling, tender to palpation over tunnelling site, patient hesitant to allow examination  Vasc: Palpable L radial pulse strong doppler signal over fistula        Labs:    02-12    140  |  109<H>  |  46<H>  ----------------------------<  94  4.6   |  22  |  5.23<H>    Ca    8.2<L>      12 Feb 2023 07:56  Phos  3.6     02-12  Mg     1.80     02-12                              7.8    5.40  )-----------( 193      ( 12 Feb 2023 07:56 )             25.9

## 2023-02-13 NOTE — PROGRESS NOTE ADULT - PROBLEM SELECTOR PLAN 5
Chronic pancreatitis; previously on Creon, will resume given likely exocrine insufficiency with malabsorption given BMI of 17.   - w/ persistent abdominal pain   - c/w Creon, intermittently refusing, educated on importance   - c/w oxycodone 10 mg q4h prn   - Bowel regimen while on opioids  - c/w reglan 5mg TID given mod food residue on EGD  - Pain mgt consulted; appreciate recs, avoid IV pain medications if possible

## 2023-02-13 NOTE — PROGRESS NOTE ADULT - PROBLEM SELECTOR PLAN 1
Pt with YUNIEL on advanced CKD in the setting of recent COVID-19 and decreased oral intake.  Now ESRD. Pt was initiated on HD on 1/28/23 due to worsening kidney function with uremia. Last HD treatment on 2/10/23 via left IJ non tunneled HD catheter. Pt. underwent LUE AVF creation on 1/5/23 with second stage brachiobasilic AVF vein transposition on 2/9/23. Pt. clinically stable. Labs reviewed. Monitor labs.  Plan for next HD on wednesday.  Ca / K / P reviewed.

## 2023-02-13 NOTE — PROGRESS NOTE ADULT - PROBLEM SELECTOR PLAN 1
YUNIEL on CKD now progressed to ESRD, started on HD   - [facility] renal duplex with severe stenosis of the left renal artery; vascular consulted, no plan for intervention for HESHAM  - non-tunnelled triple lumen HD cath placed on 1/27  - sevelamer 800 mg TID, bicarb 1300 mg TID; calcitriol increased to 0.5 for secondary hyperparathyroidism per renal  - c/w HD as per renal  - AVF placement s/p stage 2 revision on 2/9, d/w vascular --> OK to restart anticoagulation today with Eliquis given poor access

## 2023-02-13 NOTE — PROGRESS NOTE ADULT - PROBLEM SELECTOR PLAN 9
Septic shock due to pseudomonal PNA with lung abscess, morganella bacteremia and kelbsiella UTI.  Was on Zosyn since 11/25, as per ID in [facility] who recommended 4 week course of antibiotics versus possibly longer pending imaging at 4 weeks (12/23)  - CXR 12/23 - Stable RUL lesion, with new RLL pneumonia   - seen by ID, completed 6 wks zosyn 1/6  - per ID no further imaging indicated as 6 wks abx sufficient treatment

## 2023-02-14 NOTE — PROGRESS NOTE ADULT - ASSESSMENT
62M hx esophageal stricture, chronic pancreatitis, originally presented with n/v, esophageal stent removal in December, course c/b septic shock. Patient s/p first stage left brachiobasilic AVF on 1/5 with Dr. Oro. Vascular surgery reconsulted in setting of bilateral foot wounds and reported nonpalpable pulses. S/p RLE angio on 1/20 with PT plasty, now s/p RLE angio in OR with pedal access 2/3; now s/p L basilic vein transposition 2/9    Plan:  - Patient s/p basilic vein transposition, endorsing continued pain over tunneling site, however with strong doppler signals over fistula. Will continue to follow and evaluate   - Continue pain control  - Compression of LUE with ACE wrap      Vascular (C Team) Surgery  [id number]

## 2023-02-14 NOTE — PROGRESS NOTE ADULT - PROBLEM SELECTOR PLAN 1
YUNIEL on CKD now progressed to ESRD, started on HD   - [facility] renal duplex with severe stenosis of the left renal artery; vascular consulted, no plan for intervention for HESHAM  - non-tunnelled triple lumen HD cath placed on 1/27  - sevelamer 800 mg TID, bicarb 1300 mg TID; calcitriol increased to 0.5 for secondary hyperparathyroidism per renal  - c/w HD as per renal  - AVF placement s/p stage 2 revision on 2/9

## 2023-02-14 NOTE — PROGRESS NOTE ADULT - ASSESSMENT
62M EtoH misuse c/b chronic pancreatitis, HCV s/p treatment (SVR), emphysema, benign esophageal stricture (s/p stent on 4/2022) admitted at [facility] w/ septic shock septic shock due to pseudomonal PNA, lung abscess, Morganella bacteremia and Klebsiella UTI (now s/p 6 wks zosyn ended 1/6) w/ hospital course c/b YUNIEL on CKD requiring HD and MICU stay, transferred here for intractable nausea/vomiting and esophageal stent removal, now s/p removal w/ EGD sig for esophagitis/gastric ulcer. Kidney function declined with progression to ESRD, s/p permacath placement and AVF placement with stage 2 revision completed. Hospital course c/b acute on chronic anemia requiring multiple transfusions. Also with skin breakdown in right toes secondary to vascular insufficiency s/p angioplasty with vascular. Further complicated by right UE DVT with limiting IV access now on eliquis.

## 2023-02-14 NOTE — PROGRESS NOTE ADULT - PROBLEM SELECTOR PLAN 8
Patient is 64 years old, here accompanied by self today for infusion of Taxol under the orders of Dr. Rolle.  Power port accessed with 19 gauge 3/4 inch non-coring needle.  Line flushed with 10 cc's normal saline.  Needle secured with sterile transparent dressing.  10 cc's blood discarded, and blood taken for 2 tubes of ordered labs.  Patient tolerated well.  Denies pain and discomfort at this time.  Port flushes easily without resistance.    Hand hygiene performed: yes   Mask donned by caregiver: yes Site prepped with CHG: yes Labs drawn: yes Dressing applied using aseptic technique: yes     Component      Latest Ref Rng & Units 2/17/2022   WBC      4.0 - 11.0 10e3/uL 3.0 (L)   RBC Count      3.80 - 5.20 10e6/uL 3.43 (L)   Hemoglobin      11.7 - 15.7 g/dL 11.2 (L)   Hematocrit      35.0 - 47.0 % 34.7 (L)   MCV      78 - 100 fL 101 (H)   MCH      26.5 - 33.0 pg 32.7   MCHC      31.5 - 36.5 g/dL 32.3   RDW      10.0 - 15.0 % 13.4   Platelet Count      150 - 450 10e3/uL 240   % Neutrophils      % 58   % Lymphocytes      % 24   % Monocytes      % 14   % Eosinophils      % 2   % Basophils      % 2   % Immature Granulocytes      % 0   NRBCs per 100 WBC      <1 /100 0   Absolute Neutrophils      1.6 - 8.3 10e3/uL 1.8   Absolute Lymphocytes      0.8 - 5.3 10e3/uL 0.7 (L)   Absolute Monocytes      0.0 - 1.3 10e3/uL 0.4   Absolute Eosinophils      0.0 - 0.7 10e3/uL 0.1   Absolute Basophils      0.0 - 0.2 10e3/uL 0.1   Absolute Immature Granulocytes      <=0.4 10e3/uL 0.0   Absolute NRBCs      10e3/uL 0.0   Bilirubin Total      0.2 - 1.3 mg/dL 0.4   Bilirubin Direct      0.0 - 0.2 mg/dL 0.1   Protein Total      6.8 - 8.8 g/dL 6.5 (L)   Albumin      3.4 - 5.0 g/dL 3.3 (L)   Alkaline Phosphatase      40 - 150 U/L 65   AST      0 - 45 U/L 29   ALT      0 - 50 U/L 44       Patient meets parameters for today's infusion.  Denies questions or concerns regarding today's infusion and/or medications being administered.      Patient  identified with two identifiers, order verified, and verbal consent for today's infusion obtained from patient.     1046 IV pump verified with Taxol dose, drug, and rate of administration. Infusion administered per protocol. Patient tolerated infusion well, no signs or symptoms of adverse reaction noted. Patient denies pain nor discomfort.     Needle removed, tip intact. Site clean, dry and intact. Covered with a sterile bandage, slight pressure applied for 30 seconds. Pt instructed to leave bandage intact for a minimum of one hour, and to call with questions or concerns. Patient states understanding, discharged.    Hx of benign appearing esophageal stricture s/p stent on 4/2022 by Dr. Erickson   - GI consulted now s/p stent removal 12/16, path neg h pylori, some metaplasia  - c/w Protonix 40 mg BID   - Tolerating diet  - Rest of plan as above

## 2023-02-14 NOTE — PROGRESS NOTE ADULT - PROBLEM SELECTOR PLAN 9
Septic shock due to pseudomonal PNA with lung abscess, morganella bacteremia and kelbsiella UTI.  Was on Zosyn since 11/25, as per ID in [facility] who recommended 4 week course of antibiotics versus possibly longer pending imaging at 4 weeks (12/23)  - CXR 12/23 - Stable RUL lesion, with new RLL pneumonia   - seen by ID, completed 6 wks zosyn 1/6  - per ID no further imaging indicated as 6 wks abx sufficient treatment

## 2023-02-14 NOTE — PROGRESS NOTE ADULT - PROBLEM SELECTOR PLAN 2
b/l heel wounds and right 2nd and 3rd toe wound.   - Xray foot negative for signs of OM  - podiatry following- concern for poor wound healing in the setting PAD - rec local wound care until revascularization   - BARRY/PVR result reviewed; mild stenosis in LLE, moderate stenosis in RLE.   - S/p RLE angio by vascular on 1/20 with angioplasty 2/3  [ ] MR foot pending   - Appreciate vascular and podiatry recs

## 2023-02-14 NOTE — PROGRESS NOTE ADULT - SUBJECTIVE AND OBJECTIVE BOX
Vascular Surgery Progress Note    Subjective/24hour Events:   Patient seen and examined. Increased swelling this AM. Pain still present.      Vital Signs:  Vital Signs Last 24 Hrs  T(C): 36.7 (14 Feb 2023 05:36), Max: 37 (13 Feb 2023 17:09)  T(F): 98.1 (14 Feb 2023 05:36), Max: 98.6 (13 Feb 2023 17:09)  HR: 84 (14 Feb 2023 05:36) (83 - 100)  BP: 108/76 (14 Feb 2023 05:36) (108/76 - 147/79)  BP(mean): --  RR: 18 (14 Feb 2023 05:36) (18 - 18)  SpO2: 100% (14 Feb 2023 05:36) (98% - 100%)    Parameters below as of 13 Feb 2023 20:33  Patient On (Oxygen Delivery Method): room air        CAPILLARY BLOOD GLUCOSE          I&O's Detail    13 Feb 2023 07:01  -  14 Feb 2023 07:00  --------------------------------------------------------  IN:    Other (mL): 500 mL  Total IN: 500 mL    OUT:    Other (mL): 1100 mL  Total OUT: 1100 mL    Total NET: -600 mL          Physical Exam:  Gen: NAD  CV: Regular rate  Resp: Nonlabored breathing on room air  Ext: LUE with continued swelling, tender to palpation over tunnelling site, patient hesitant to allow examination  Vasc: Palpable L radial pulse strong doppler signal over fistula        Labs:    02-14    141  |  109<H>  |  42<H>  ----------------------------<  83  3.2<L>   |  22  |  4.39<H>    Ca    7.8<L>      14 Feb 2023 06:53  Phos  3.6     02-14  Mg     1.70     02-14                              8.5    5.57  )-----------( 223      ( 14 Feb 2023 06:53 )             28.6

## 2023-02-14 NOTE — PROGRESS NOTE ADULT - SUBJECTIVE AND OBJECTIVE BOX
Patient is a 62y old  Male who presents with a chief complaint of intractable N/V , esophageal stent removal (14 Feb 2023 11:01)    Canelo Herrera MD   [facility] Division of Hospital Medicine   Pager [phone]  Reachable on [employer] Teams     SUBJECTIVE / OVERNIGHT EVENTS:  Patient seen and examined this morning, patient resting comfortably when walking . No events overnight. Continues to endorse some foot pain.     MEDICATIONS  (STANDING):  apixaban 5 milliGRAM(s) Oral every 12 hours  cadexomer iodine 0.9% Gel 1 Application(s) Topical daily  calcitriol   Capsule 0.5 MICROGram(s) Oral daily  chlorhexidine 2% Cloths 1 Application(s) Topical two times a day  epoetin mejia-epbx (RETACRIT) Injectable 20567 Unit(s) IV Push <User Schedule>  folic acid 1 milliGRAM(s) Oral daily  lidocaine   4% Patch 1 Patch Transdermal every 24 hours  metoclopramide 5 milliGRAM(s) Oral three times a day  multivitamin 1 Tablet(s) Oral daily  NIFEdipine XL 30 milliGRAM(s) Oral daily  pancrelipase  (CREON  6,000 Lipase Units) 1 Capsule(s) Oral three times a day with meals  pantoprazole    Tablet 40 milliGRAM(s) Oral every 12 hours  polyethylene glycol 3350 17 Gram(s) Oral two times a day  potassium chloride   Powder 40 milliEquivalent(s) Oral once  senna 2 Tablet(s) Oral at bedtime  sucralfate 1 Gram(s) Oral every 6 hours  thiamine 100 milliGRAM(s) Oral daily    MEDICATIONS  (PRN):  bisacodyl 5 milliGRAM(s) Oral every 12 hours PRN Constipation  ondansetron Injectable 4 milliGRAM(s) IV Push every 6 hours PRN Nausea and/or Vomiting  oxyCODONE    IR 10 milliGRAM(s) Oral every 4 hours PRN Severe Pain (7 - 10)  sodium chloride 0.9% lock flush 10 milliLiter(s) IV Push every 1 hour PRN Pre/post blood products, medications, blood draw, and to maintain line patency  tiZANidine 2 milliGRAM(s) Oral every 8 hours PRN Muscle Cramps      Vital Signs Last 24 Hrs  T(C): 36.7 (14 Feb 2023 09:30), Max: 37 (13 Feb 2023 17:09)  T(F): 98 (14 Feb 2023 09:30), Max: 98.6 (13 Feb 2023 17:09)  HR: 85 (14 Feb 2023 09:30) (83 - 100)  BP: 111/69 (14 Feb 2023 09:30) (108/76 - 117/75)  BP(mean): --  RR: 17 (14 Feb 2023 09:30) (17 - 18)  SpO2: 100% (14 Feb 2023 09:30) (100% - 100%)  CAPILLARY BLOOD GLUCOSE        I&O's Summary    13 Feb 2023 07:01  -  14 Feb 2023 07:00  --------------------------------------------------------  IN: 500 mL / OUT: 1100 mL / NET: -600 mL        General: frail/ cachectic appearing man sitting up in bed NAD  Eyes: conjunctiva clear, nonicteric sclera  Respiratory: No respiratory distress, CTABL, No rales, rhonchi, wheezing.  Cardiovascular: S1,S2; Regular rate and rhythm; No m/g/r.  Gastrointestinal: midline surgical scar - healed, soft, mild tenderness throughout the abdomen, Nondistended; +BS.   Extremities: BUE swelling, No c/c/e; warm to touch  Skin: No rashes, No erythema   Psych: appropriate mood and affect    LABS:                        8.5    5.57  )-----------( 223      ( 14 Feb 2023 06:53 )             28.6     02-14    141  |  109<H>  |  42<H>  ----------------------------<  83  3.2<L>   |  22  |  4.39<H>    Ca    7.8<L>      14 Feb 2023 06:53  Phos  3.6     02-14  Mg     1.70     02-14                RADIOLOGY & ADDITIONAL TESTS:    Imaging Personally Reviewed:    Consultant(s) Notes Reviewed:      Care Discussed with Consultants/Other Providers: Adarsh

## 2023-02-15 NOTE — PROGRESS NOTE ADULT - ASSESSMENT
62M hx esophageal stricture, chronic pancreatitis, originally presented with n/v, esophageal stent removal in December, course c/b septic shock. Patient s/p first stage left brachiobasilic AVF on 1/5 with Dr. Oro. Vascular surgery reconsulted in setting of bilateral foot wounds and reported nonpalpable pulses. S/p RLE angio on 1/20 with PT plasty, now s/p RLE angio in OR with pedal access 2/3; now s/p L basilic vein transposition 2/9    Plan:  - Patient s/p basilic vein transposition, endorsing continued pain over tunneling site, however with strong doppler signals over fistula. Will continue to follow and evaluate   - Continue pain control  - CONTINUE Compression of LUE with ACE wrap      Vascular (C Team) Surgery  [id number]

## 2023-02-15 NOTE — CONSULT NOTE ADULT - CONSULT REQUESTED BY NAME
ACP
Yessy Montelongo
Dave Montelongo
medicine
primary team
Medicine
Dr Yessy Montelongo
Dr. Herrera
Vera Burgess

## 2023-02-15 NOTE — PROGRESS NOTE ADULT - PROBLEM SELECTOR PLAN 9
Septic shock due to pseudomonal PNA with lung abscess, morganella bacteremia and kelbsiella UTI.  Was on Zosyn since 11/25, as per ID in [facility] who recommended 4 week course of antibiotics versus possibly longer pending imaging at 4 weeks (12/23)  - CXR 12/23 - Stable RUL lesion, with new RLL pneumonia   - seen by ID, completed 6 wks zosyn 1/6  - per ID no further imaging indicated as 6 wks abx sufficient treatment

## 2023-02-15 NOTE — CONSULT NOTE ADULT - SUBJECTIVE AND OBJECTIVE BOX
Interventional Radiology      62 year old male with Hx of esophageal stricture, Emphysema, HCV, chronic pancreatitis, presented with intractable nausea and vomiting. Had Trialysis catheter placed on 1/27.        Allergies: Tylenol (Other)      Medications (Abx/Cardiac/Anticoagulation/Blood Products)    apixaban: 5 milliGRAM(s) Oral (02-15 @ 05:57)  NIFEdipine XL: 30 milliGRAM(s) Oral (02-15 @ 05:57)      Data:    T(C): 36.3  HR: 80  BP: 160/64  RR: 18  SpO2: 100%    -WBC 7.10 / HgB 8.3 / Hct 28.2 / Plt 245  -Na 139 / Cl 108 / BUN 49 / Glucose 104  -K 3.5 / CO2 19 / Cr 5.12  -ALT -- / Alk Phos -- / T.Bili --  -INR -- / PTT 27.5      Assessment/Plan:     62 year old male with Hx of esophageal stricture, Emphysema, HCV, chronic pancreatitis, presented with intractable nausea and vomiting. Course c/b RUE DVT, currently on Eliquis. Also had AVF placed on LUE. Had Trialysis catheter placed on 1/27. Nephrology requesting placement of tunneled catheter on Right and removal of Trialysis catheter.     - Plan for placement of Right tunneled dialysis catheter. Can put order for IR procedure under Dr. Denney.  - NPO AMN for sedation  - Please recheck CBC BMP Coags at 4 AM  - Ensure covid swab within 5 days of procedure      Yi Ross MD PGY2  Interventional Radiology     Contact on [employer] Teams for nonemergent issues    - Nonemergent consults:  [facility] order "Consult- Interventional Radiology", no page required  - Emergent issues (pager): [facility] [phone]; [facility] [phone]; [phone]; DO NOT PAGE FOR SCHEDULING QUESTIONS  - Scheduling questions 8am-6pm : [facility] [phone]; [facility] [phone],   - Clinic/outpatient booking: [facility] [phone]; [facility] [phone]  Interventional Radiology      62 year old male with Hx of esophageal stricture, Emphysema, HCV, chronic pancreatitis, presented with intractable nausea and vomiting. Had Trialysis catheter placed on 1/27.        Allergies: Tylenol (Other)      Medications (Abx/Cardiac/Anticoagulation/Blood Products)    apixaban: 5 milliGRAM(s) Oral (02-15 @ 05:57)  NIFEdipine XL: 30 milliGRAM(s) Oral (02-15 @ 05:57)      Data:    T(C): 36.3  HR: 80  BP: 160/64  RR: 18  SpO2: 100%    -WBC 7.10 / HgB 8.3 / Hct 28.2 / Plt 245  -Na 139 / Cl 108 / BUN 49 / Glucose 104  -K 3.5 / CO2 19 / Cr 5.12  -ALT -- / Alk Phos -- / T.Bili --  -INR -- / PTT 27.5      Assessment/Plan:     62 year old male with Hx of esophageal stricture, Emphysema, HCV, chronic pancreatitis, presented with intractable nausea and vomiting. Course c/b RUE DVT, currently on Eliquis, will place on heparin drip in the interim. Also had AVF placed on LUE. Had Trialysis catheter placed on 1/27. Nephrology requesting placement of tunneled catheter on Right and removal of Trialysis catheter.     - Plan for placement of Right tunneled dialysis catheter. Can put order for IR procedure under Dr. Denney.  - NPO AMN for sedation  - Please hold Eliquis tonight and tomorrow morning  - Please hold heparin drip 4 hours prior to the procedure  - Please recheck CBC BMP Coags at 4 AM  - Ensure covid swab within 5 days of procedure      Yi Ross MD PGY2  Interventional Radiology     Contact on [employer] Teams for nonemergent issues    - Nonemergent consults:  [facility] order "Consult- Interventional Radiology", no page required  - Emergent issues (pager): [facility] [phone]; [facility] [phone]; [phone]; DO NOT PAGE FOR SCHEDULING QUESTIONS  - Scheduling questions 8am-6pm : [facility] [phone]; [facility] [phone],   - Clinic/outpatient booking: [facility] [phone]; [facility] [phone]

## 2023-02-15 NOTE — PROGRESS NOTE ADULT - SUBJECTIVE AND OBJECTIVE BOX
Patient is a 62y old  Male who presents with a chief complaint of intractable N/V , esophageal stent removal (15 Feb 2023 11:02)    Canelo Herrera MD   [facility] Division of Hospital Medicine   Pager [phone]  Reach[employer] on [employer] Teams     SUBJECTIVE / OVERNIGHT EVENTS:  Patient seen and examined this morning. Reporting conflicting information this morning in regards to arm pain, initially states that he has had continued pain, later states that he did not have pain in the arm. He reports that his main concern is food. Is concerned that he is not getting the food he ordered nor is he getting enough food.   He states that he is agreeable to going to short term care while.    MEDICATIONS  (STANDING):  apixaban 5 milliGRAM(s) Oral every 12 hours  cadexomer iodine 0.9% Gel 1 Application(s) Topical daily  calcitriol   Capsule 0.5 MICROGram(s) Oral daily  chlorhexidine 2% Cloths 1 Application(s) Topical two times a day  epoetin mejia-epbx (RETACRIT) Injectable 67020 Unit(s) IV Push <User Schedule>  folic acid 1 milliGRAM(s) Oral daily  lidocaine   4% Patch 1 Patch Transdermal every 24 hours  metoclopramide 5 milliGRAM(s) Oral three times a day  multivitamin 1 Tablet(s) Oral daily  NIFEdipine XL 30 milliGRAM(s) Oral daily  pancrelipase  (CREON  6,000 Lipase Units) 1 Capsule(s) Oral three times a day with meals  pantoprazole    Tablet 40 milliGRAM(s) Oral every 12 hours  polyethylene glycol 3350 17 Gram(s) Oral two times a day  senna 2 Tablet(s) Oral at bedtime  sucralfate 1 Gram(s) Oral every 6 hours  thiamine 100 milliGRAM(s) Oral daily    MEDICATIONS  (PRN):  bisacodyl 5 milliGRAM(s) Oral every 12 hours PRN Constipation  ondansetron Injectable 4 milliGRAM(s) IV Push every 6 hours PRN Nausea and/or Vomiting  oxyCODONE    IR 10 milliGRAM(s) Oral every 4 hours PRN Severe Pain (7 - 10)  sodium chloride 0.9% lock flush 10 milliLiter(s) IV Push every 1 hour PRN Pre/post blood products, medications, blood draw, and to maintain line patency  tiZANidine 2 milliGRAM(s) Oral every 8 hours PRN Muscle Cramps      Vital Signs Last 24 Hrs  T(C): 36.3 (15 Feb 2023 09:31), Max: 37.1 (14 Feb 2023 21:11)  T(F): 97.3 (15 Feb 2023 09:31), Max: 98.7 (14 Feb 2023 21:11)  HR: 80 (15 Feb 2023 09:31) (80 - 100)  BP: 160/64 (15 Feb 2023 09:31) (115/72 - 168/95)  BP(mean): --  RR: 18 (15 Feb 2023 09:31) (16 - 18)  SpO2: 100% (15 Feb 2023 09:31) (98% - 100%)  CAPILLARY BLOOD GLUCOSE        I&O's Summary    14 Feb 2023 07:01  -  15 Feb 2023 07:00  --------------------------------------------------------  IN: 0 mL / OUT: 450 mL / NET: -450 mL    15 Feb 2023 07:01  -  15 Feb 2023 12:45  --------------------------------------------------------  IN: 400 mL / OUT: 900 mL / NET: -500 mL        General: frail/ cachectic appearing man sitting up in bed NAD  Eyes: conjunctiva clear, nonicteric sclera  Neck: triple lumen vas cath in place   Respiratory: No respiratory distress, CTABL, No rales, rhonchi, wheezing.  Cardiovascular: S1,S2; Regular rate and rhythm; No m/g/r.  Gastrointestinal: midline surgical scar - healed, soft, mild tenderness throughout the abdomen, Nondistended; +BS.   Extremities: BUE swelling, No c/c/e; warm to touch  Skin: No rashes, No erythema   Psych: appropriate mood and affect      LABS:                        8.3    7.10  )-----------( 245      ( 15 Feb 2023 06:30 )             28.2     02-15    139  |  108<H>  |  49<H>  ----------------------------<  104<H>  3.5   |  19<L>  |  5.12<H>    Ca    7.9<L>      15 Feb 2023 06:30  Phos  3.9     02-15  Mg     1.70     02-15                RADIOLOGY & ADDITIONAL TESTS:    Imaging Personally Reviewed:    Consultant(s) Notes Reviewed:      Care Discussed with Consultants/Other Providers: Nephro

## 2023-02-15 NOTE — PROGRESS NOTE ADULT - SUBJECTIVE AND OBJECTIVE BOX
Vascular Surgery Progress Note    Subjective/24hour Events:   Patient seen and examined. Increased swelling this AM, patient endorsing pain. Patient refusing ACE wrap.- patient educated by team importance of ACE.       Vital Signs Last 24 Hrs  T(C): 36.3 (15 Feb 2023 09:31), Max: 37.1 (14 Feb 2023 21:11)  T(F): 97.3 (15 Feb 2023 09:31), Max: 98.7 (14 Feb 2023 21:11)  HR: 80 (15 Feb 2023 09:31) (80 - 100)  BP: 160/64 (15 Feb 2023 09:31) (115/72 - 168/95)  BP(mean): --  RR: 18 (15 Feb 2023 09:31) (16 - 18)  SpO2: 100% (15 Feb 2023 09:31) (98% - 100%)    Parameters below as of 15 Feb 2023 09:31  Patient On (Oxygen Delivery Method): room air          I&O's Detail    14 Feb 2023 07:01  -  15 Feb 2023 07:00  --------------------------------------------------------  IN:  Total IN: 0 mL    OUT:    Voided (mL): 450 mL  Total OUT: 450 mL    Total NET: -450 mL      15 Feb 2023 07:01  -  15 Feb 2023 11:04  --------------------------------------------------------  IN:    Other (mL): 400 mL  Total IN: 400 mL    OUT:    Other (mL): 900 mL  Total OUT: 900 mL    Total NET: -500 mL              Physical Exam:  Gen: NAD  CV: Regular rate  Resp: Nonlabored breathing on room air  Ext: LUE with continued swelling, tender to palpation over tunnelling site, patient hesitant to allow examination  Vasc: Palpable L radial pulse strong doppler signal over fistula        Labs:                        8.3    7.10  )-----------( 245      ( 15 Feb 2023 06:30 )             28.2     02-15    139  |  108<H>  |  49<H>  ----------------------------<  104<H>  3.5   |  19<L>  |  5.12<H>    Ca    7.9<L>      15 Feb 2023 06:30  Phos  3.9     02-15  Mg     1.70     02-15

## 2023-02-15 NOTE — PROGRESS NOTE ADULT - PROBLEM SELECTOR PLAN 1
No Seen during HD today.  Pt with YUNIEL on advanced CKD in the setting of recent COVID-19 and decreased oral intake.  Now ESRD. Pt was initiated on HD on 1/28/23 due to worsening kidney function with uremia. Last HD treatment on 2/13/23 via left IJ non tunneled HD catheter. Pt. underwent LUE AVF creation on 1/5/23 with second stage brachiobasilic AVF vein transposition on 2/9/23. Pt. clinically stable. Labs reviewed. Monitor labs.  Plan for next HD on friday Ca / K / P reviewed.  Will likely need tunneled HD catheter prior to discharge. Seen during HD today.  Pt with YUNIEL on advanced CKD in the setting of recent COVID-19 and decreased oral intake.  Now ESRD. Pt was initiated on HD on 1/28/23 due to worsening kidney function with uremia. Last HD treatment on 2/13/23 via left IJ non tunneled HD catheter. Pt. underwent LUE AVF creation on 1/5/23 with second stage brachiobasilic AVF vein transposition on 2/9/23. Pt. clinically stable. Labs reviewed. Monitor labs.  Plan for next HD on friday Ca / K / P reviewed.  Spoke with vascular attending.  Fistula will need a few more weeks to mature therefore recommend IR evaluation for tunneled HD catheter - preferably right side.

## 2023-02-15 NOTE — PROGRESS NOTE ADULT - PROBLEM SELECTOR PLAN 2
b/l heel wounds and right 2nd and 3rd toe wound.   - Xray foot negative for signs of OM  - podiatry following- concern for poor wound healing in the setting PAD - rec local wound care until revascularization   - BARRY/PVR result reviewed; mild stenosis in LLE, moderate stenosis in RLE.   - S/p RLE angio by vascular on 1/20 with angioplasty 2/3  - d/w podiatry, no need for MR at this time given lower suspicion for OM   - Appreciate vascular and podiatry recs

## 2023-02-15 NOTE — CONSULT NOTE ADULT - CONSULT REASON
Bilateral foot wounds
Preop Risk Stratification
YUNIEL on CKD
Abdominal pain
central venous access
esophageal stent removal
avf planning
lung abscess
Tunneled catheter

## 2023-02-15 NOTE — PROGRESS NOTE ADULT - SUBJECTIVE AND OBJECTIVE BOX
[facility] Division of Kidney Diseases & Hypertension  FOLLOW UP NOTE  --------------------------------------------------------------------------------    Chief Complaint: YUNIEL on CKD/Ongoing hemodialysis requirement    24 hour events/subjective: Pt. was seen and evaluated during HD treatment today reports feeling well other than his continued abdominal pain and some right arm pain.  tolerating treatment blood pressure stable.        PAST HISTORY  --------------------------------------------------------------------------------  No significant changes to PMH, PSH, FHx, SHx, unless otherwise noted    ALLERGIES & MEDICATIONS  --------------------------------------------------------------------------------  Allergies    Tylenol (Other)    Intolerances      Standing Inpatient Medications  apixaban 5 milliGRAM(s) Oral every 12 hours  cadexomer iodine 0.9% Gel 1 Application(s) Topical daily  calcitriol   Capsule 0.5 MICROGram(s) Oral daily  chlorhexidine 2% Cloths 1 Application(s) Topical two times a day  epoetin mejia-epbx (RETACRIT) Injectable 81086 Unit(s) IV Push <User Schedule>  folic acid 1 milliGRAM(s) Oral daily  lidocaine   4% Patch 1 Patch Transdermal every 24 hours  metoclopramide 5 milliGRAM(s) Oral three times a day  multivitamin 1 Tablet(s) Oral daily  NIFEdipine XL 30 milliGRAM(s) Oral daily  pancrelipase  (CREON  6,000 Lipase Units) 1 Capsule(s) Oral three times a day with meals  pantoprazole    Tablet 40 milliGRAM(s) Oral every 12 hours  polyethylene glycol 3350 17 Gram(s) Oral two times a day  senna 2 Tablet(s) Oral at bedtime  sucralfate 1 Gram(s) Oral every 6 hours  thiamine 100 milliGRAM(s) Oral daily    PRN Inpatient Medications  bisacodyl 5 milliGRAM(s) Oral every 12 hours PRN  ondansetron Injectable 4 milliGRAM(s) IV Push every 6 hours PRN  oxyCODONE    IR 10 milliGRAM(s) Oral every 4 hours PRN  sodium chloride 0.9% lock flush 10 milliLiter(s) IV Push every 1 hour PRN  tiZANidine 2 milliGRAM(s) Oral every 8 hours PRN      REVIEW OF SYSTEMS  --------------------------------------------------------------------------------  Gen: no fever  Respiratory: no sob  CV: no cp  GI: no ab pain  : no complaints  MSK: no pain    VITALS/PHYSICAL EXAM  --------------------------------------------------------------------------------  T(C): 36.3 (02-15-23 @ 09:31), Max: 37.1 (02-14-23 @ 21:11)  HR: 80 (02-15-23 @ 09:31) (80 - 100)  BP: 160/64 (02-15-23 @ 09:31) (115/72 - 168/95)  ABP: --  ABP(mean): --  RR: 18 (02-15-23 @ 09:31) (16 - 18)  SpO2: 100% (02-15-23 @ 09:31) (98% - 100%)  CVP(mm Hg): --        02-14-23 @ 07:01  -  02-15-23 @ 07:00  --------------------------------------------------------  IN: 0 mL / OUT: 450 mL / NET: -450 mL    02-15-23 @ 07:01  -  02-15-23 @ 10:42  --------------------------------------------------------  IN: 400 mL / OUT: 900 mL / NET: -500 mL      Physical Exam:    Gen: resting and in NAD  HEENT: no JVD  Pulm: CTA B/L  CV: S1S2+  Abd: Soft  HD access: Left IJ non-tunneled HD catheter being used for HD  LUE AVF wrapped in ACE bandage    LABS/STUDIES  --------------------------------------------------------------------------------              8.3    7.10  >-----------<  245      [02-15-23 @ 06:30]              28.2     139  |  108  |  49  ----------------------------<  104      [02-15-23 @ 06:30]  3.5   |  19  |  5.12        Ca     7.9     [02-15-23 @ 06:30]      Mg     1.70     [02-15-23 @ 06:30]      Phos  3.9     [02-15-23 @ 06:30]            Creatinine Trend:  SCr 5.12 [02-15 @ 06:30]  SCr 4.39 [02-14 @ 06:53]  SCr 5.87 [02-13 @ 06:45]  SCr 5.23 [02-12 @ 07:56]  SCr 4.00 [02-11 @ 06:16]

## 2023-02-15 NOTE — CONSULT NOTE ADULT - PROVIDER SPECIALTY LIST ADULT
Cardiology
Intervent Radiology
Infectious Disease
Pain Medicine
Podiatry
Vascular Surgery
Gastroenterology
Nephrology
Intervent Radiology

## 2023-02-15 NOTE — CHART NOTE - NSCHARTNOTEFT_GEN_A_CORE
PRE-INTERVENTIONAL RADIOLOGY PROCEDURE NOTE      Patient Age:  62    Patient Gender: male    Procedure: new permacath, right sided      Diagnosis/Indication: ESRD on HD     Interventional Radiology Attending Physician: Dr. Denney    Ordering Attending Physician: Dr. Herrera    Pertinent Medical History: EtoH misuse c/b chronic pancreatitis, HCV s/p treatment (SVR), emphysema, benign esophageal stricture (s/p stent on 4/2022), pseudomonal PNA, lung abscess, Morganella bacteremia and Klebsiella UTI with new ESRD on HD    Pertinent labs:                      8.3    7.10  )-----------( 245      ( 15 Feb 2023 06:30 )             28.2       02-15    139  |  108<H>  |  49<H>  ----------------------------<  104<H>  3.5   |  19<L>  |  5.12<H>    Ca    7.9<L>      15 Feb 2023 06:30  Phos  3.9     02-15  Mg     1.70     02-15        Patient and Family Aware ? Yes    Layne Elizondo PA-C  Department of Medicine  Pager [phone]

## 2023-02-15 NOTE — CONSULT NOTE ADULT - REASON FOR ADMISSION
intractable N/V , esophageal stent removal

## 2023-02-15 NOTE — CONSULT NOTE ADULT - CONSULT REQUESTED DATE/TIME
26-Jan-2023 17:11
15-Feb-2023
18-Jan-2023 18:01
14-Jan-2023 15:52
28-Dec-2022
15-Dec-2022 11:39
23-Dec-2022 20:23
17-Dec-2022 14:00
23-Dec-2022 17:41

## 2023-02-15 NOTE — PROGRESS NOTE ADULT - ASSESSMENT
62M EtoH misuse c/b chronic pancreatitis, HCV s/p treatment (SVR), emphysema, benign esophageal stricture (s/p stent on 4/2022) admitted at [facility] w/ septic shock septic shock due to pseudomonal PNA, lung abscess, Morganella bacteremia and Klebsiella UTI (now s/p 6 wks zosyn ended 1/6) w/ hospital course c/b YUNIEL on CKD requiring HD and MICU stay, transferred here for intractable nausea/vomiting and esophageal stent removal, now s/p removal w/ EGD sig for esophagitis/gastric ulcer. Kidney function declined with progression to ESRD, s/p permacath placement and AVF placement with stage 2 revision completed. Hospital course c/b acute on chronic anemia requiring multiple transfusions. Also with skin breakdown in right toes secondary to vascular insufficiency s/p angioplasty with vascular. Further complicated by right UE DVT with limiting IV access now on eliquis.

## 2023-02-16 NOTE — PROCEDURE NOTE - ADDITIONAL PROCEDURE DETAILS
attempted right sided access. venogram demonstrates completely occluded RIJ/REJ  reviewed prior imaging, patient with right cephalic superficial thrombosis, NO CONTRAINDICATION TO RIGHT ARM IV PLACEMENT, right IV placed    exchanged left jugular nontunneled to TDC.   - tip is at the SVC, catheter is OK to use.   -head of bed >45 degrees to prevent oozing  - SQH may be resumed @ 6h post procedure if no sign of bleeding  - all other orders and diet may be resumed per primary team

## 2023-02-16 NOTE — PROCEDURE NOTE - NSPROCDETAILS_GEN_ALL_CORE
guidewire change of existing cahteter/guidewire recovered/lumen(s) aspirated and flushed/sterile dressing applied/sterile technique, catheter placed
location identified, draped/prepped, sterile technique used/blood seen on insertion/dressing applied/flushes easily/secured in place/sterile technique, catheter placed

## 2023-02-16 NOTE — PROVIDER CONTACT NOTE (OTHER) - SITUATION
patient c/o of abd pain and chest pain. states symptoms have been going on for several days.
patient refused morning nifedipine and carafate
Patient refused collection of morning labs.
Pt in [address] E[facility] Chaudhari' left internal jugular catheterization site looks bloody.
pt refused carafate; pt c/o of stomach upset; education provided
Pt is refusing oral medication including BP meds following multiple attempts to reeducate
patient refused morning medications
patient was noted with elevated SBP of 150s
patient refused nightime medication: carafate heparin dulcolax
/94, HR 87
Patient had an episode of emesis after receiving medications.
Patient have serve edema to the left arm, Patient has an right upper arm arrow.
Received patient Heparin drip not connected to IV line
patient is refusing all meds
pt in [address] Mr. Chaudhari aptt is 25.5
pt is refusing most of his meds such as folic acid, pancrealipase, thiamine, etc. pt only accepted heparin, zosyn and wants his pain med
pt refused most of his 12pm and 14pm meds
PT is a hard stick, couldn't get blood for aptt
Patient is refusing PTT labs until he speaks to provider and receives IV pain medications. Patient keeps taking off heparin drip IV without notifying anyone and getting out of bed.
patient hard stick. unable to obtain stat blood work. pca nurse and anm tried to stick patient. patient also needs new IV
unable to obtain morning labs. patient hard stick

## 2023-02-16 NOTE — PROGRESS NOTE ADULT - SUBJECTIVE AND OBJECTIVE BOX
Vascular Surgery Progress Note    Subjective/24hour Events:   Patient seen and examined. Increased swelling this AM, patient endorsing pain. Patient refusing ACE wrap.- patient educated by team importance of ACE.       Vital Signs Last 24 Hrs  T(C): 36.9 (16 Feb 2023 06:20), Max: 36.9 (15 Feb 2023 20:59)  T(F): 98.4 (16 Feb 2023 06:20), Max: 98.5 (15 Feb 2023 20:59)  HR: 81 (16 Feb 2023 06:20) (80 - 93)  BP: 118/58 (16 Feb 2023 06:20) (118/58 - 160/64)  BP(mean): --  RR: 18 (16 Feb 2023 06:20) (18 - 18)  SpO2: 100% (16 Feb 2023 06:20) (98% - 100%)    Parameters below as of 16 Feb 2023 06:20  Patient On (Oxygen Delivery Method): room air      I&O's Detail    15 Feb 2023 07:01  -  16 Feb 2023 07:00  --------------------------------------------------------  IN:    Other (mL): 400 mL  Total IN: 400 mL    OUT:    Other (mL): 900 mL  Total OUT: 900 mL    Total NET: -500 mL        Physical Exam:  Gen: NAD  CV: Regular rate  Resp: Nonlabored breathing on room air  Ext: LUE with continued swelling, tender to palpation over tunnelling site, patient hesitant to allow examination.   Vasc: Palpable L radial pulse strong doppler signal over fistula.         Labs:                                   9.5    5.94  )-----------( 256      ( 16 Feb 2023 06:40 )             31.8     02-16    141  |  107  |  30<H>  ----------------------------<  76  3.3<L>   |  23  |  3.78<H>    Ca    8.3<L>      16 Feb 2023 06:40  Phos  3.1     02-16  Mg     1.60     02-16

## 2023-02-16 NOTE — PROVIDER CONTACT NOTE (OTHER) - REASON
Patient have serve edema to the left arm
hypertension
Pt is refusing oral medication including BP meds following multiple attempts to reeducate
patient refused morning medications
patient refused morning nifedipine and carafate
patient refused night time medication
pt in [address] Mr. Chaudhari aptt is 25.5
PT is a hard stick, couldn't get blood for aptt
Patient had an episode of emesis.
Refused AM labs
hypertension
patient hard stick. unable to obtain stat blood work
pt refuses most of his meds
pt refusing most of his meds
unable to obtain morning labs. patient is hard stick
Patient refusing labs, taking off heparin drip by himself
patient c/o ongoing abd pain and chest pain.
patient in [address] [facility] Chaudhari' left internal jugular catheterization site looks bloody
pt refused carafate; pt c/o of stomach upset; education provided
Patient on heparin drip @12cc/hr  accessed on left neck IJ
patient is refusing all meds

## 2023-02-16 NOTE — PROVIDER CONTACT NOTE (OTHER) - ASSESSMENT
A&Ox4 hard stick. unable to get stat blood work on patient. pca nurse ileana attempted to stick patient without success. Patient also needs new IV.
A&Ox4 vitals stable no s/s of acute distress
A&Ox4 vitals stable. no s/s of acute distress
PT is a hard stick, couldn't get blood for aptt
Patient had an episode of emesis after receiving pain medications along with his morning meds. Patient complains of pain but threw up his oxycodone for pain management.
Patient was refusing medications, nurse re-educated but patient was still refusing. Leatha from vascular team came to assess patient, re-educated him on taking medications, patient agreed to take medication to Leatha, nurse went back in to given medications and patient continued to refuse.
pt denies chest pain, dizziness, headache or sob. VSS. pt is noncompliant with most his meds. pt only takes IV zosyn, heparin, protonix and oxy q4h. pt stated he will try to take pancreatitis after teaching provided. pt is not able to digest a lot food and keeps ordered food from the kitchen.
A&Ox4. vitals stable. 100% on room air. grimacing and c/o of abdominal pain and chest pain. states symptoms have been ongoing on for the past several days.
Patient awake & responsive
Patient is A&Ox4, VS stable. Complains of abdominal pain 7/10.
Patient showing no signs of distress.
patient resting on bed, no c/o chest pain and SOB, denied headache or vision change. patient is asymptomatic.
pt does not want any of his meds and states he does not take them. pt is c/o of pain and wants oxycodone q4h instead of q8h. no signs of severe pain noted. VSS. pt denies chest pain, headache or shortness of breath. pt wants diet changed to regular.
patient was resting on bed with oxygen 2L/min, no c/o chest pain and SOB, no c/o headache and vision change.
pt a&ox4. NAD.
A&O 4, VSS, safety maintained, not in acute distress.
Pt not in distress.
A&OX3. vitals stable. no s/s of distress
AOx3. vitals stable. no s/s of acute distress
pt does not want any of his oral meds with exception of Oxycodone. Pt is asymptomatic, resting comfortably in bed. Pt states he "will take BP medication if it is IV"
pt in [address] Mr. Chaudhari aptt is 25.5

## 2023-02-16 NOTE — PROVIDER CONTACT NOTE (OTHER) - DATE AND TIME:
19-Dec-2022 17:30
18-Dec-2022 00:24
18-Dec-2022 17:46
20-Jan-2023 23:42
02-Jan-2023 06:29
05-Feb-2023 11:00
07-Feb-2023 11:50
18-Dec-2022 00:20
18-Dec-2022 07:05
27-Dec-2022 13:40
28-Dec-2022 06:32
31-Dec-2022 14:00
17-Dec-2022 20:44
19-Dec-2022 07:13
05-Feb-2023 15:40
16-Feb-2023 01:36
27-Jan-2023 20:00
30-Jan-2023 10:15
08-Feb-2023 01:10
25-Jan-2023 23:48
18-Dec-2022 08:52

## 2023-02-16 NOTE — PROGRESS NOTE ADULT - ASSESSMENT
62M EtoH misuse c/b chronic pancreatitis, HCV s/p treatment (SVR), emphysema, benign esophageal stricture (s/p stent on 4/2022) admitted at [facility] w/ septic shock septic shock due to pseudomonal PNA, lung abscess, Morganella bacteremia and Klebsiella UTI (now s/p 6 wks zosyn ended 1/6) w/ hospital course c/b YUNIEL on CKD requiring HD and MICU stay, transferred here for intractable nausea/vomiting and esophageal stent removal, now s/p removal w/ EGD sig for esophagitis/gastric ulcer. Kidney function declined with progression to ESRD, s/p permacath placement and AVF placement with stage 2 revision completed. Hospital course c/b acute on chronic anemia requiring multiple transfusions. Also with skin breakdown in right toes secondary to vascular insufficiency s/p angioplasty with vascular. Further complicated by right UE DVT with limiting IV access now on eliquis.

## 2023-02-16 NOTE — PROGRESS NOTE ADULT - SUBJECTIVE AND OBJECTIVE BOX
Patient is a 62y old  Male who presents with a chief complaint of intractable N/V , esophageal stent removal (16 Feb 2023 08:27)    Canelo Herrera MD   [facility]   Pager [phone]  Reachable on Microsoft Teams     SUBJECTIVE / OVERNIGHT EVENTS:  Patient seen and examined this morning. No events overnight. He continues to endorse pain all over worse on the right arm     MEDICATIONS  (STANDING):  cadexomer iodine 0.9% Gel 1 Application(s) Topical daily  calcitriol   Capsule 0.5 MICROGram(s) Oral daily  chlorhexidine 2% Cloths 1 Application(s) Topical two times a day  epoetin mejia-epbx (RETACRIT) Injectable 55027 Unit(s) IV Push <User Schedule>  folic acid 1 milliGRAM(s) Oral daily  lidocaine   4% Patch 1 Patch Transdermal every 24 hours  metoclopramide 5 milliGRAM(s) Oral three times a day  multivitamin 1 Tablet(s) Oral daily  NIFEdipine XL 30 milliGRAM(s) Oral daily  pancrelipase  (CREON  6,000 Lipase Units) 1 Capsule(s) Oral three times a day with meals  pantoprazole    Tablet 40 milliGRAM(s) Oral every 12 hours  polyethylene glycol 3350 17 Gram(s) Oral two times a day  potassium chloride   Powder 20 milliEquivalent(s) Oral once  senna 2 Tablet(s) Oral at bedtime  sucralfate 1 Gram(s) Oral every 6 hours  thiamine 100 milliGRAM(s) Oral daily    MEDICATIONS  (PRN):  bisacodyl 5 milliGRAM(s) Oral every 12 hours PRN Constipation  ondansetron Injectable 4 milliGRAM(s) IV Push every 6 hours PRN Nausea and/or Vomiting  oxyCODONE    IR 10 milliGRAM(s) Oral every 4 hours PRN Severe Pain (7 - 10)  sodium chloride 0.9% lock flush 10 milliLiter(s) IV Push every 1 hour PRN Pre/post blood products, medications, blood draw, and to maintain line patency  tiZANidine 2 milliGRAM(s) Oral every 8 hours PRN Muscle Cramps      Vital Signs Last 24 Hrs  T(C): 36.9 (16 Feb 2023 06:20), Max: 36.9 (15 Feb 2023 20:59)  T(F): 98.4 (16 Feb 2023 06:20), Max: 98.5 (15 Feb 2023 20:59)  HR: 81 (16 Feb 2023 06:20) (81 - 93)  BP: 118/58 (16 Feb 2023 06:20) (118/58 - 138/65)  BP(mean): --  RR: 18 (16 Feb 2023 06:20) (18 - 18)  SpO2: 100% (16 Feb 2023 06:20) (98% - 100%)  CAPILLARY BLOOD GLUCOSE        I&O's Summary    15 Feb 2023 07:01  -  16 Feb 2023 07:00  --------------------------------------------------------  IN: 400 mL / OUT: 900 mL / NET: -500 mL        General: frail/ cachectic appearing man sitting up in bed NAD  Eyes: conjunctiva clear, nonicteric sclera  Neck: triple lumen vas cath in place   Respiratory: No respiratory distress, CTABL, No rales, rhonchi, wheezing.  Cardiovascular: S1,S2; Regular rate and rhythm; No m/g/r.  Gastrointestinal: midline surgical scar - healed, soft, mild tenderness throughout the abdomen, Nondistended; +BS.   Extremities: BUE swelling, no visible bruising or hematoma on upper extremities warm to touch  Skin: No rashes, No erythema   Psych: appropriate mood and affect    LABS:                        9.5    5.94  )-----------( 256      ( 16 Feb 2023 06:40 )             31.8     02-16    141  |  107  |  30<H>  ----------------------------<  76  3.3<L>   |  23  |  3.78<H>    Ca    8.3<L>      16 Feb 2023 06:40  Phos  3.1     02-16  Mg     1.60     02-16      PT/INR - ( 16 Feb 2023 06:40 )   PT: 12.0 sec;   INR: 1.03 ratio         PTT - ( 16 Feb 2023 06:40 )  PTT:29.8 sec          RADIOLOGY & ADDITIONAL TESTS:    Imaging Personally Reviewed:    Consultant(s) Notes Reviewed:      Care Discussed with Consultants/Other Providers: Vascular

## 2023-02-16 NOTE — PROGRESS NOTE ADULT - ASSESSMENT
62M hx esophageal stricture, chronic pancreatitis, originally presented with n/v, esophageal stent removal in December, course c/b septic shock. Patient s/p first stage left brachiobasilic AVF on 1/5 with Dr. Oro. Vascular surgery reconsulted in setting of bilateral foot wounds and reported nonpalpable pulses. S/p RLE angio on 1/20 with PT plasty, now s/p RLE angio in OR with pedal access 2/3; now s/p L basilic vein transposition 2/9    Plan:  - Patient s/p basilic vein transposition, endorsing continued pain over tunneling site, however with strong doppler signals over fistula. Will continue to follow and evaluate   - Continue pain control  - CONTINUE Compression of LUE with ACE wrap    Vascular (C Team) Surgery  [id number]

## 2023-02-16 NOTE — PROGRESS NOTE ADULT - PROBLEM SELECTOR PLAN 9
Septic shock due to pseudomonal PNA with lung abscess, morganella bacteremia and kelbsiella UTI.  Was on Zosyn since 11/25, as per ID in [facility] who recommended 4 week course of antibiotics versus possibly longer pending imaging at 4 weeks (12/23)  - CXR 12/23 - Stable RUL lesion, with new RLL pneumonia   - seen by ID, completed 6 wks zosyn 1/6  - per ID no further imaging indicated as 6 wks abx sufficient treatment

## 2023-02-16 NOTE — PROGRESS NOTE ADULT - TIME BILLING
review of laboratory data, radiology results, discussion with patient, negotiating most appropriate analgesic regimen, documenting in [place]. Interventions were performed as documented above.
Review of laboratory data, consultants' recommendations, documentation in [place], discussion with patient/RN/ACP and interdisciplinary staff (such as , social workers, etc). Interventions were performed as documented above.
Review of laboratory data, consultants' recommendations, documentation in [place], discussion with patient/RN/ACP and interdisciplinary staff (such as , social workers, etc). Interventions were performed as documented above.
Review of laboratory data, consultants' recommendations, documentation in [place], discussion with patient/RN/ACP and interdisciplinary staff (such as , social workers, etc). Interventions were performed as documented above.
Review of laboratory data, consultants' recommendations, documentation in [place], discussion with patient/RN/ACP and interdisciplinary staff (such as , social workers, etc). Interventions were performed as documented above.
review of laboratory data, radiology results, discussion with patient and interdiciplinary staff, documenting in [place]. Interventions were performed as documented above.

## 2023-02-16 NOTE — PROGRESS NOTE ADULT - PROBLEM SELECTOR PLAN 3
VA duplex on 1/26 - acute non-occlusive deep venous thrombosis noted within the right brachial vein at the mid/distal segment.  [ ] on Eliquis 5mg BID; on hold today for procedure, resume when possible post-procedure

## 2023-02-16 NOTE — PROGRESS NOTE ADULT - PROBLEM SELECTOR PLAN 1
YUNIEL on CKD now progressed to ESRD, started on HD   - [facility] renal duplex with severe stenosis of the left renal artery; vascular consulted, no plan for intervention for HESHAM  - non-tunnelled triple lumen HD cath placed on 1/27, exchange for right sided tunnelled catheter on 2/16  - sevelamer 800 mg TID, bicarb 1300 mg TID; calcitriol increased to 0.5 for secondary hyperparathyroidism per renal  - c/w HD as per renal  - AVF placement s/p stage 2 revision on 2/9

## 2023-02-16 NOTE — PROGRESS NOTE ADULT - PROBLEM SELECTOR PLAN 10
COVID + on 12/13, persistently positive  - on RA, no iso indicated COVID + on 12/13,   - asymptomatic course on RA  - now off isolation

## 2023-02-16 NOTE — PROVIDER CONTACT NOTE (OTHER) - BACKGROUND
Admitted for digestive system disorder.
pt admitted for digestive system disorder. PMH CKD, ETOH abuse, hep C, pancreatitis, gout, and HTN.
pt admitted with digestive system disorder
Patient was admitted for digestive system disorder,
digestive system disorder pancreatitis
digestive system disorder. htn
pt admitted for digestive system disorder. PMH HTN, CKD, gout, alcohol abuse, pancreatitis, and hep C.
pt on heparin drip,
CKD, N/V
Pt had [facility] catheterization this afternoon, triple lumen trialysis placed.
digestive system disorder
Pt is a 62-year-old Male with Hx of esophageal stricture (S/p stenting in 5/22), Emphysema, HCV, chronic pancreatitis who initially presented to [facility] on 11/25/22 for AMS.
digestive system disorder
pt admitted for digestive system disorder
62 year old male admitted for digestive system disorder, EtoH misuse c/b chronic pancreatitis. Right toes concerning for underline infection secondary to vascular insuff.
digestive system disorder htn
digestive system disorder
digestive system disorder htn
pt admitted for digestive system disorder. PMH CKD, ETOH abuse, hep C, pancreatitis, gout, and HTN.

## 2023-02-17 NOTE — PROGRESS NOTE ADULT - SUBJECTIVE AND OBJECTIVE BOX
[facility] DIVISION OF KIDNEY DISEASES AND HYPERTENSION -- HEMODIALYSIS NOTE   Nehrology Office [phone]  available on [employer] teams--> Tamy Baird   --------------------------------------------------------------------------------  Chief Complaint: ESRD/Ongoing hemodialysis requirement  pt was evaluated during HD session this am. BP and Blood flow during dialysis are acceptable. c/o fluid and diet restrictions   24 hour events/subjective:      ALLERGIES & MEDICATIONS  --------------------------------------------------------------------------------  Allergies    Tylenol (Other)    Intolerances      Standing Inpatient Medications  apixaban 5 milliGRAM(s) Oral two times a day  cadexomer iodine 0.9% Gel 1 Application(s) Topical daily  calcitriol   Capsule 0.5 MICROGram(s) Oral daily  chlorhexidine 2% Cloths 1 Application(s) Topical two times a day  chlorhexidine 4% Liquid 1 Application(s) Topical <User Schedule>  epoetin mejia-epbx (RETACRIT) Injectable 31941 Unit(s) IV Push <User Schedule>  folic acid 1 milliGRAM(s) Oral daily  lidocaine   4% Patch 1 Patch Transdermal every 24 hours  metoclopramide 5 milliGRAM(s) Oral three times a day  multivitamin 1 Tablet(s) Oral daily  NIFEdipine XL 30 milliGRAM(s) Oral daily  pancrelipase  (CREON  6,000 Lipase Units) 1 Capsule(s) Oral three times a day with meals  pantoprazole    Tablet 40 milliGRAM(s) Oral every 12 hours  polyethylene glycol 3350 17 Gram(s) Oral two times a day  senna 2 Tablet(s) Oral at bedtime  sucralfate 1 Gram(s) Oral every 6 hours  thiamine 100 milliGRAM(s) Oral daily    PRN Inpatient Medications  bisacodyl 5 milliGRAM(s) Oral every 12 hours PRN  ondansetron Injectable 4 milliGRAM(s) IV Push every 6 hours PRN  oxyCODONE    IR 10 milliGRAM(s) Oral every 4 hours PRN  sodium chloride 0.9% lock flush 10 milliLiter(s) IV Push every 1 hour PRN  tiZANidine 2 milliGRAM(s) Oral every 8 hours PRN        VITALS/PHYSICAL EXAM  --------------------------------------------------------------------------------  T(C): 36.6 (02-17-23 @ 06:25), Max: 36.8 (02-16-23 @ 14:41)  HR: 75 (02-17-23 @ 06:25) (75 - 98)  BP: 130/58 (02-17-23 @ 06:25) (122/58 - 147/82)  RR: 18 (02-17-23 @ 06:25) (17 - 18)  SpO2: 99% (02-17-23 @ 05:21) (99% - 100%)  Wt(kg): --        Physical Exam:  	Gen NAD  	HEENT: no JVD  	Pulm: CTABL  	CV: S1S2,  	Abd: Soft,   	Ext:   - edema B/L LE   	Neuro: Awake and alert  	Skin: Warm and dry          Vascular: L IJ tunneled HD catheter, L AVF + bruit over ACE wrap     LABS/STUDIES  --------------------------------------------------------------------------------              8.2    6.51  >-----------<  251      [02-17-23 @ 06:30]              28.0     140  |  108  |  44  ----------------------------<  100      [02-17-23 @ 06:30]  3.5   |  22  |  4.83        Ca     7.8     [02-17-23 @ 06:30]      Mg     1.60     [02-17-23 @ 06:30]      Phos  4.4     [02-17-23 @ 06:30]      PT/INR: PT 12.0 , INR 1.03       [02-16-23 @ 06:40]  PTT: 29.8       [02-16-23 @ 06:40]      Iron 95, TIBC <125, %sat --      [01-16-23 @ 11:00]  Ferritin 311      [01-03-23 @ 06:12]  PTH -- (Ca --)      [12-22-22 @ 06:40]   290  Vitamin D (25OH) 10.4      [01-04-23 @ 06:45]  HbA1c 5.0      [03-22-19 @ 09:03]  TSH 1.380      [09-01-22 @ 08:58]  Lipid: chol 147, TG 88, HDL 59, LDL --      [09-01-22 @ 08:58]    HBsAb 10.9      [01-28-23 @ 17:41]  HBsAg Nonreact      [01-28-23 @ 17:41]  HBcAb Reactive      [01-28-23 @ 17:41]  HCV 8.31, Reactive      [01-28-23 @ 17:41]

## 2023-02-17 NOTE — PROGRESS NOTE ADULT - ASSESSMENT
62M EtoH misuse c/b chronic pancreatitis, HCV s/p treatment (SVR), emphysema, benign esophageal stricture (s/p stent on 4/2022) admitted at [facility] w/ septic shock septic shock due to pseudomonal PNA, lung abscess, Morganella bacteremia and Klebsiella UTI (now s/p 6 wks zosyn ended 1/6) w/ hospital course c/b YUNIEL on CKD requiring HD and MICU stay, transferred here for intractable nausea/vomiting and esophageal stent removal, now s/p removal w/ EGD sig for esophagitis/gastric ulcer. Kidney function declined with progression to ESRD, s/p permacath placement and AVF placement with stage 2 revision completed. Hospital course c/b acute on chronic anemia requiring multiple transfusions. Also with skin breakdown in right toes secondary to vascular insufficiency s/p angioplasty with vascular. Further complicated by right UE DVT with limiting IV access now on eliquis.

## 2023-02-17 NOTE — PROGRESS NOTE ADULT - ASSESSMENT
62M hx esophageal stricture, chronic pancreatitis, originally presented with n/v, esophageal stent removal in December, course c/b septic shock. Patient s/p first stage left brachiobasilic AVF on 1/5 with Dr. Oro. Vascular surgery reconsulted in setting of bilateral foot wounds and reported nonpalpable pulses. S/p RLE angio on 1/20 with PT plasty, now s/p RLE angio in OR with pedal access 2/3; now s/p L basilic vein transposition 2/9    Plan:  - Patient s/p basilic vein transposition, endorsing continued pain over tunneling site, however with strong doppler signals over fistula  - Continue pain control prn   - Continue gentle compression of LUE with ACE wrap   - Please call back with questions or concerns       Vascular (C Team) Surgery  [id number] 62M hx esophageal stricture, chronic pancreatitis, originally presented with n/v, esophageal stent removal in December, course c/b septic shock. Patient s/p first stage left brachiobasilic AVF on 1/5 with Dr. Oro. Vascular surgery reconsulted in setting of bilateral foot wounds and reported nonpalpable pulses. S/p RLE angio on 1/20 with PT plasty, now s/p RLE angio in OR with pedal access 2/3; now s/p L basilic vein transposition 2/9 now s/p L IJ placement on 2/16    Plan:  - Patient s/p basilic vein transposition, strong doppler signals over fistula  - Continue pain control prn   - Continue gentle compression of LUE with ACE wrap   - Please call back with questions or concerns       Vascular (C Team) Surgery  [id number]

## 2023-02-17 NOTE — PROGRESS NOTE ADULT - PROBLEM SELECTOR PLAN 1
Seen during HD today.  Pt with YUNIEL on advanced CKD in the setting of recent COVID-19 and decreased oral intake.  Now ESRD. Pt was initiated on HD on 1/28/23 due to worsening kidney function with uremia. Last HD treatment on 2/15/23 via left IJ non tunneled HD catheter. Pt. underwent LUE AVF creation on 1/5/23 with second stage brachiobasilic AVF vein transposition on 2/9/23.  Labs reviewed. Monitor labs.  Plan for next HD on Monday 2/20.   Ca / K / P reviewed.  3Ca bath    Fistula will need a few more weeks per vascular to mature  Phos is controlled

## 2023-02-17 NOTE — PROGRESS NOTE ADULT - PROBLEM SELECTOR PLAN 9
Septic shock due to pseudomonal PNA with lung abscess, morganella bacteremia and kelbsiella UTI.  Was on Zosyn since 11/25, as per ID in [facility] who recommended 4 week course of antibiotics versus possibly longer pending imaging at 4 weeks (12/23)  - CXR 12/23 - Stable RUL lesion, with new RLL pneumonia   - seen by ID, completed 6 wks zosyn 1/6  - per ID no further imaging indicated as 6 wks abx sufficient treatment

## 2023-02-17 NOTE — PROGRESS NOTE ADULT - SUBJECTIVE AND OBJECTIVE BOX
Patient is a 62y old  Male who presents with a chief complaint of intractable N/V , esophageal stent removal (17 Feb 2023 12:03)    Canelo Herrera MD   [facility] Division of Hospital Medicine   Pager [phone]  Reachable on [employer] Teams     SUBJECTIVE / OVERNIGHT EVENTS:  Patient seen and examined this morning. He states that he has some soreness over the tunned catheter si.    MEDICATIONS  (STANDING):  apixaban 5 milliGRAM(s) Oral two times a day  cadexomer iodine 0.9% Gel 1 Application(s) Topical daily  calcitriol   Capsule 0.5 MICROGram(s) Oral daily  chlorhexidine 2% Cloths 1 Application(s) Topical two times a day  chlorhexidine 4% Liquid 1 Application(s) Topical <User Schedule>  epoetin mejia-epbx (RETACRIT) Injectable 19989 Unit(s) IV Push <User Schedule>  folic acid 1 milliGRAM(s) Oral daily  lidocaine   4% Patch 1 Patch Transdermal every 24 hours  metoclopramide 5 milliGRAM(s) Oral three times a day  multivitamin 1 Tablet(s) Oral daily  NIFEdipine XL 30 milliGRAM(s) Oral daily  pancrelipase  (CREON  6,000 Lipase Units) 1 Capsule(s) Oral three times a day with meals  pantoprazole    Tablet 40 milliGRAM(s) Oral every 12 hours  polyethylene glycol 3350 17 Gram(s) Oral two times a day  senna 2 Tablet(s) Oral at bedtime  sucralfate 1 Gram(s) Oral every 6 hours  thiamine 100 milliGRAM(s) Oral daily    MEDICATIONS  (PRN):  bisacodyl 5 milliGRAM(s) Oral every 12 hours PRN Constipation  ondansetron Injectable 4 milliGRAM(s) IV Push every 6 hours PRN Nausea and/or Vomiting  oxyCODONE    IR 10 milliGRAM(s) Oral every 4 hours PRN Severe Pain (7 - 10)  sodium chloride 0.9% lock flush 10 milliLiter(s) IV Push every 1 hour PRN Pre/post blood products, medications, blood draw, and to maintain line patency  tiZANidine 2 milliGRAM(s) Oral every 8 hours PRN muscle cramps      Vital Signs Last 24 Hrs  T(C): 36.5 (17 Feb 2023 09:45), Max: 36.8 (16 Feb 2023 14:41)  T(F): 97.7 (17 Feb 2023 09:45), Max: 98.3 (16 Feb 2023 21:40)  HR: 74 (17 Feb 2023 09:45) (74 - 98)  BP: 126/56 (17 Feb 2023 09:45) (122/58 - 147/82)  BP(mean): --  RR: 18 (17 Feb 2023 09:45) (17 - 18)  SpO2: 99% (17 Feb 2023 05:21) (99% - 100%)  CAPILLARY BLOOD GLUCOSE        I&O's Summary    17 Feb 2023 07:01  -  17 Feb 2023 14:11  --------------------------------------------------------  IN: 400 mL / OUT: 1400 mL / NET: -1000 mL        General: NAD, awake and alert  Eyes: conjunctiva clear, nonicteric sclera  HENMT: NCAT, MMM  Neck: Supple, trachea midline   Respiratory: No respiratory distress, CTABL, No rales, rhonchi, wheezing.  Cardiovascular: S1,S2; Regular rate and rhythm; No m/g/r. 2+ peripheral pulses  Gastrointestinal: Soft, Nontender, Nondistended; +BS.   Extremities: No c/c/e; warm to touch  Neurological: Moving all 4 extremities; Sensation to LT grossly in tact.  Skin: No rashes, No erythema   Psych: AAOx3; appropriate mood and affect    LABS:                        8.2    6.51  )-----------( 251      ( 17 Feb 2023 06:30 )             28.0     02-17    140  |  108<H>  |  44<H>  ----------------------------<  100<H>  3.5   |  22  |  4.83<H>    Ca    7.8<L>      17 Feb 2023 06:30  Phos  4.4     02-17  Mg     1.60     02-17      PT/INR - ( 16 Feb 2023 06:40 )   PT: 12.0 sec;   INR: 1.03 ratio         PTT - ( 16 Feb 2023 06:40 )  PTT:29.8 sec          RADIOLOGY & ADDITIONAL TESTS:    Imaging Personally Reviewed:    Consultant(s) Notes Reviewed:      Care Discussed with Consultants/Other Providers:

## 2023-02-18 NOTE — PROGRESS NOTE ADULT - ASSESSMENT
62M EtoH misuse c/b chronic pancreatitis, HCV s/p treatment (SVR), emphysema, benign esophageal stricture (s/p stent on 4/2022) admitted at [facility] w/ septic shock septic shock due to pseudomonal PNA, lung abscess, Morganella bacteremia and Klebsiella UTI (now s/p 6 wks zosyn ended 1/6) w/ hospital course c/b YUNIEL on CKD requiring HD and MICU stay, transferred here for intractable nausea/vomiting and esophageal stent removal, now s/p removal w/ EGD sig for esophagitis/gastric ulcer. Kidney function declined with progression to ESRD, s/p permacath placement and AVF placement with stage 2 revision completed. Hospital course c/b acute on chronic anemia requiring multiple transfusions. Also with skin breakdown in right toes secondary to vascular insufficiency s/p angioplasty with vascular. Further complicated by right UE DVT with limiting IV access now on eliquis.

## 2023-02-18 NOTE — PROGRESS NOTE ADULT - PROBLEM SELECTOR PLAN 3
VA duplex on 1/26 - acute non-occlusive deep venous thrombosis noted within the right brachial vein at the mid/distal segment.  - on Eliquis 5mg BID

## 2023-02-18 NOTE — PROGRESS NOTE ADULT - SUBJECTIVE AND OBJECTIVE BOX
Patient is a 62y old  Male who presents with a chief complaint of intractable N/V , esophageal stent removal (17 Feb 2023 14:10)      SUBJECTIVE / OVERNIGHT EVENTS: No acute events overnight. Patient eating breakfast this AM. No swallowing trouble.     MEDICATIONS  (STANDING):  apixaban 5 milliGRAM(s) Oral two times a day  cadexomer iodine 0.9% Gel 1 Application(s) Topical daily  calcitriol   Capsule 0.5 MICROGram(s) Oral daily  chlorhexidine 2% Cloths 1 Application(s) Topical two times a day  chlorhexidine 4% Liquid 1 Application(s) Topical <User Schedule>  epoetin mejia-epbx (RETACRIT) Injectable 88853 Unit(s) IV Push <User Schedule>  folic acid 1 milliGRAM(s) Oral daily  lidocaine   4% Patch 1 Patch Transdermal every 24 hours  metoclopramide 5 milliGRAM(s) Oral three times a day  multivitamin 1 Tablet(s) Oral daily  NIFEdipine XL 30 milliGRAM(s) Oral daily  pancrelipase  (CREON  6,000 Lipase Units) 1 Capsule(s) Oral three times a day with meals  pantoprazole    Tablet 40 milliGRAM(s) Oral every 12 hours  polyethylene glycol 3350 17 Gram(s) Oral two times a day  senna 2 Tablet(s) Oral at bedtime  sucralfate 1 Gram(s) Oral every 6 hours  thiamine 100 milliGRAM(s) Oral daily    MEDICATIONS  (PRN):  bisacodyl 5 milliGRAM(s) Oral every 12 hours PRN Constipation  ondansetron Injectable 4 milliGRAM(s) IV Push every 6 hours PRN Nausea and/or Vomiting  oxyCODONE    IR 10 milliGRAM(s) Oral every 4 hours PRN Severe Pain (7 - 10)  sodium chloride 0.9% lock flush 10 milliLiter(s) IV Push every 1 hour PRN Pre/post blood products, medications, blood draw, and to maintain line patency  tiZANidine 2 milliGRAM(s) Oral every 8 hours PRN muscle cramps      T(C): 36.9 (02-18-23 @ 06:25), Max: 37.3 (02-17-23 @ 21:10)  HR: 72 (02-18-23 @ 06:25) (51 - 78)  BP: 120/70 (02-18-23 @ 06:25) (110/68 - 120/70)  RR: 18 (02-18-23 @ 06:25) (18 - 18)  SpO2: 100% (02-18-23 @ 06:25) (92% - 100%)  CAPILLARY BLOOD GLUCOSE        I&O's Summary    17 Feb 2023 07:01  -  18 Feb 2023 07:00  --------------------------------------------------------  IN: 1000 mL / OUT: 1400 mL / NET: -400 mL        PHYSICAL EXAM:  GENERAL: no apparent distress, on room air  EYES: EOMI, PERRLA, conjunctiva and sclera clear b/l  CHEST/LUNG: Clear to auscultation bilaterally; No wheezing or crackles  HEART: s1/s2, RRR, no murmurs appreciated  ABDOMEN: Soft, Nontender, Nondistended; Bowel sounds present  EXTREMITIES:  2+ Peripheral Pulses, No clubbing, cyanosis, or edema  MSK: no joint effusions  Back: no spinal tenderness  NEUROLOGY: awake, alert, responds to Qs appropriately, no sensory or motor deficits, 5/5 muscle strength equal in all extremities, CN 2-12 grossly intact  PSYCH: calm, cooperative  SKIN: No rashes or lesions    LABS:                        9.5    8.54  )-----------( 271      ( 18 Feb 2023 07:20 )             32.4     02-18    141  |  106  |  39<H>  ----------------------------<  91  3.4<L>   |  23  |  4.42<H>    Ca    8.3<L>      18 Feb 2023 07:20  Phos  3.3     02-18  Mg     1.60     02-18                RADIOLOGY & ADDITIONAL TESTS:

## 2023-02-18 NOTE — PROGRESS NOTE ADULT - PROBLEM SELECTOR PLAN 9
Septic shock due to pseudomonal PNA with lung abscess, morganella bacteremia and kelbsiella UTI.  Was on Zosyn since 11/25, as per ID in [facility] who recommended 4 week course of antibiotics versus possibly longer pending imaging at 4 weeks (12/23)  - CXR 12/23 - Stable RUL lesion, with new RLL pneumonia   - seen by ID, completed 6 wks zosyn 1/6  - per ID no further imaging indicated as 6 wks abx sufficient treatment

## 2023-02-19 NOTE — PROGRESS NOTE ADULT - ASSESSMENT
62M EtoH misuse c/b chronic pancreatitis, HCV s/p treatment (SVR), emphysema, benign esophageal stricture (s/p stent on 4/2022) admitted at [facility] w/ septic shock septic shock due to pseudomonal PNA, lung abscess, Morganella bacteremia and Klebsiella UTI (now s/p 6 wks zosyn ended 1/6) w/ hospital course c/b YUNIEL on CKD requiring HD and MICU stay, transferred here for intractable nausea/vomiting and esophageal stent removal, now s/p removal w/ EGD sig for esophagitis/gastric ulcer. Kidney function declined with progression to ESRD, s/p permacath placement and AVF placement with stage 2 revision completed. Hospital course c/b acute on chronic anemia requiring multiple transfusions. Also with skin breakdown in right toes secondary to vascular insufficiency s/p angioplasty with vascular. Further complicated by right UE DVT with limiting IV access now on eliquis.

## 2023-02-19 NOTE — PROGRESS NOTE ADULT - SUBJECTIVE AND OBJECTIVE BOX
Patient is a 62y old  Male who presents with a chief complaint of intractable N/V , esophageal stent removal (18 Feb 2023 14:39)      SUBJECTIVE / OVERNIGHT EVENTS: No acute events overnight. No complaints this AM.     MEDICATIONS  (STANDING):  apixaban 5 milliGRAM(s) Oral two times a day  cadexomer iodine 0.9% Gel 1 Application(s) Topical daily  calcitriol   Capsule 0.5 MICROGram(s) Oral daily  chlorhexidine 2% Cloths 1 Application(s) Topical two times a day  chlorhexidine 4% Liquid 1 Application(s) Topical <User Schedule>  epoetin mejia-epbx (RETACRIT) Injectable 73996 Unit(s) IV Push <User Schedule>  folic acid 1 milliGRAM(s) Oral daily  lidocaine   4% Patch 1 Patch Transdermal every 24 hours  metoclopramide 5 milliGRAM(s) Oral three times a day  multivitamin 1 Tablet(s) Oral daily  NIFEdipine XL 30 milliGRAM(s) Oral daily  pancrelipase  (CREON  6,000 Lipase Units) 1 Capsule(s) Oral three times a day with meals  pantoprazole    Tablet 40 milliGRAM(s) Oral every 12 hours  polyethylene glycol 3350 17 Gram(s) Oral two times a day  senna 2 Tablet(s) Oral at bedtime  sucralfate 1 Gram(s) Oral every 6 hours  thiamine 100 milliGRAM(s) Oral daily    MEDICATIONS  (PRN):  bisacodyl 5 milliGRAM(s) Oral every 12 hours PRN Constipation  ondansetron Injectable 4 milliGRAM(s) IV Push every 6 hours PRN Nausea and/or Vomiting  oxyCODONE    IR 10 milliGRAM(s) Oral every 4 hours PRN Severe Pain (7 - 10)  sodium chloride 0.9% lock flush 10 milliLiter(s) IV Push every 1 hour PRN Pre/post blood products, medications, blood draw, and to maintain line patency  tiZANidine 2 milliGRAM(s) Oral every 8 hours PRN muscle cramps      T(C): 36.7 (02-19-23 @ 06:11), Max: 36.9 (02-18-23 @ 15:08)  HR: 81 (02-19-23 @ 06:11) (76 - 100)  BP: 126/52 (02-19-23 @ 06:11) (101/66 - 126/52)  RR: 18 (02-19-23 @ 06:11) (18 - 18)  SpO2: 99% (02-19-23 @ 06:11) (99% - 100%)  CAPILLARY BLOOD GLUCOSE        I&O's Summary    18 Feb 2023 07:01  -  19 Feb 2023 07:00  --------------------------------------------------------  IN: 840 mL / OUT: 0 mL / NET: 840 mL        PHYSICAL EXAM:  GENERAL: no apparent distress, on room air  EYES: EOMI, PERRLA, conjunctiva and sclera clear b/l  CHEST/LUNG: Clear to auscultation bilaterally; No wheezing or crackles  HEART: s1/s2, RRR, no murmurs appreciated  ABDOMEN: Soft, Nontender, Nondistended; Bowel sounds present  EXTREMITIES:  2+ Peripheral Pulses, No clubbing, cyanosis, or edema  MSK: no joint effusions  Back: no spinal tenderness  NEUROLOGY: awake, alert, responds to Qs appropriately, no sensory or motor deficits, 5/5 muscle strength equal in all extremities, CN 2-12 grossly intact  PSYCH: calm, cooperative  SKIN: No rashes or lesions    LABS:                        9.5    8.54  )-----------( 271      ( 18 Feb 2023 07:20 )             32.4     02-18    141  |  106  |  39<H>  ----------------------------<  91  3.4<L>   |  23  |  4.42<H>    Ca    8.3<L>      18 Feb 2023 07:20  Phos  3.3     02-18  Mg     1.60     02-18                RADIOLOGY & ADDITIONAL TESTS:

## 2023-02-19 NOTE — PROGRESS NOTE ADULT - PROBLEM SELECTOR PLAN 9
Septic shock due to pseudomonal PNA with lung abscess, morganella bacteremia and kelbsiella UTI.  Was on Zosyn since 11/25, as per ID in [facility] who recommended 4 week course of antibiotics versus possibly longer pending imaging at 4 weeks (12/23)  - CXR 12/23 - Stable RUL lesion, with new RLL pneumonia   - seen by ID, completed 6 wks zosyn 1/6  - per ID no further imaging indicated as 6 wks abx sufficient treatment

## 2023-02-20 NOTE — PROGRESS NOTE ADULT - ATTENDING COMMENTS
Patient examined and ROS reviewed. A case of ESRD examined during HD. Patient is tolerating dialysis well. Blood flow through access is adequate. Advised to  continue UF.

## 2023-02-20 NOTE — PROGRESS NOTE ADULT - PROBLEM SELECTOR PROBLEM 12
HTN (hypertension)
Need for prophylactic measure
HTN (hypertension)
Need for prophylactic measure
Need for prophylactic measure
HTN (hypertension)
Need for prophylactic measure
Need for prophylactic measure
HTN (hypertension)
Need for prophylactic measure
HTN (hypertension)
Need for prophylactic measure
HTN (hypertension)

## 2023-02-20 NOTE — DISCHARGE NOTE NURSING/CASE MANAGEMENT/SOCIAL WORK - NSDCVIVACCINE_GEN_ALL_CORE_FT
Tdap; 30-Aug-2022 22:32; Rusty Stratton (RN); [employer]; [id number] (Exp. Date: 29-Nov-2024); IntraMuscular; Deltoid Left.; 0.5 milliLiter(s); VIS (VIS Published: 09-May-2013, VIS Presented: 30-Aug-2022);

## 2023-02-20 NOTE — DISCHARGE NOTE NURSING/CASE MANAGEMENT/SOCIAL WORK - NSDCPEFALRISK_GEN_ALL_CORE
For information on Fall & Injury Prevention, visit: [email or web address] OR  [email or web address] OR  https://www.cdc.gov/steadi/patient.html

## 2023-02-20 NOTE — PROGRESS NOTE ADULT - PROBLEM SELECTOR PLAN 1
Pt with YUNIEL on advanced CKD in the setting of recent COVID-19 and decreased oral intake. Now ESRD. Pt was initiated on HD on 1/28/23 due to worsening kidney function with uremia. Pt. underwent LUE AVF creation on 1/5/23 with second stage brachiobasilic AVF vein transposition on 2/9/23. Undergoing HD today (2/20/23) via left IJ non tunneled HD catheter. Pt. is clinically stable. Labs reviewed. Monitor labs.

## 2023-02-20 NOTE — PROGRESS NOTE ADULT - PROBLEM SELECTOR PLAN 11
- c/w folic acid (def 4.4 in 12/21)-refusing, thiamine, MV
DVT ppx: Heparin subq   Diet: Regular diet, monitor to see if tolerating   Dispo: Plan for [facility] pending medical stabilization
- c/w folic acid (def 4.4 in 12/21)-refusing, thiamine, MV
BP slightly elevated, intermittently refusing BP meds   - renal dopplers show left sided HESHAM 60-99%, vascular not recommending intervention   - c/w Nifedipine 30mg QD
DVT ppx: Heparin subq   Diet: Regular diet, monitor to see if tolerating   Dispo: Plan for [facility] pending medical stabilization
- c/w folic acid (def 4.4 in 12/21)-refusing, thiamine, MV
[facility]  severe protein calorie malnutrition per nutrition eval   pending [facility] placement, repeat COVID swab, last ND 1/3
- c/w folic acid (def 4.4 in 12/21)-refusing, thiamine, MV
BP slightly elevated, intermittently refusing BP meds   - renal dopplers show left sided HESHAM 60-99%, vascular not recommending intervention   - c/w Nifedipine 30mg QD
DVT ppx: Heparin subq   Diet: NPO until GI eval    #RUE swelling, u/s negative for DVT but has superficial thrombophlebitis in the cephalic vein.
DVT ppx: Heparin subq   Diet: Clear liquid diet as per GI recommendations, pending further recommendations regarding advancing the diet. Attempted to reach out on Sunday. F/u recommendations.     #RUE swelling, u/s negative for DVT but has superficial thrombophlebitis in the cephalic vein.
- c/w folic acid (def 4.4 in 12/21)-refusing, thiamine, MV
BP slightly elevated, intermittently refusing BP meds   - renal dopplers show left sided HESHAM 60-99%, vascular not recommending intervention   - c/w Nifedipine 30mg QD
BP slightly elevated, intermittently refusing BP meds   - renal dopplers show left sided HESHAM 60-99%, vascular not recommending intervention   - c/w Nifedipine 30mg QD
- c/w folic acid (def 4.4 in 12/21)-refusing, thiamine, MV
BP slightly elevated, intermittently refusing BP meds   - renal dopplers show left sided HESHAM 60-99%, vascular not recommending intervention   - c/w Nifedipine 30mg QD
DVT ppx: Heparin subq   Diet: Regular diet, monitor to see if tolerating   Dispo: Plan for [facility] pending medical stabilization
[facility]  [facility] pending cath and AVF
- c/w folic acid (def 4.4 in 12/21)-refusing, thiamine, MV
- c/w folic acid (def 4.4 in 12/21)-refusing, thiamine, MV
BP slightly elevated, intermittently refusing BP meds   - renal dopplers show left sided HESHAM 60-99%, vascular not recommending intervention   - c/w Nifedipine 30mg QD
BP slightly elevated, intermittently refusing BP meds   - renal dopplers show left sided HESHAM 60-99%, vascular not recommending intervention   - c/w Nifedipine 30mg QD
[facility]  severe protein calorie malnutrition per nutrition eval   [facility] pending cath and AVF
BP slightly elevated, intermittently refusing BP meds   - renal dopplers show left sided HESHAM 60-99%, vascular not recommending intervention   - c/w Nifedipine 30mg QD
[facility]  severe protein calorie malnutrition per nutrition eval   [facility] pending cath and AVF 1/5
- c/w folic acid (def 4.4 in 12/21)-refusing, thiamine, MV
s/p treatment  - HCV RNA not detected on 11/27/22
s/p treatment  - HCV RNA not detected on 11/27/22
- c/w folic acid (def 4.4 in 12/21)-refusing, thiamine, MV
- c/w folic acid (def 4.4 in 12/21)-refusing, thiamine, MV
DVT ppx: Heparin subq   Diet: Regular diet, monitor to see if tolerating   Dispo: Plan for [facility] pending medical stabilization
s/p treatment  - HCV RNA not detected on 11/27/22
s/p treatment  - HCV RNA not detected on 11/27/22
- c/w folic acid (def 4.4 in 12/21)-refusing, thiamine, MV
- c/w folic acid (def 4.4 in 12/21)-refusing, thiamine, MV
DVT ppx: Heparin subq   Diet: NPO until GI eval
s/p treatment  - HCV RNA not detected on 11/27/22
- c/w folic acid (def 4.4 in 12/21)-refusing, thiamine, MV
s/p treatment  - HCV RNA not detected on 11/27/22
- c/w folic acid (def 4.4 in 12/21)-refusing, thiamine, MV
s/p treatment  - HCV RNA not detected on 11/27/22
s/p treatment  - HCV RNA not detected on 11/27/22
- c/w folic acid (def 4.4 in 12/21)-refusing, thiamine, MV
- c/w folic acid (def 4.4 in 12/21)-refusing, thiamine, MV
BP slightly elevated, intermittently refusing BP meds   - renal dopplers show left sided HESHAM 60-99%, vascular not recommending intervention   - c/w Nifedipine 30mg QD
s/p treatment  - HCV RNA not detected on 11/27/22

## 2023-02-20 NOTE — PROGRESS NOTE ADULT - SUBJECTIVE AND OBJECTIVE BOX
[facility] Medicine  Dr. Bre Martinez  Pager [phone]    Patient is a 62y old  Male who presents with a chief complaint of intractable N/V , esophageal stent removal (18 Feb 2023 14:39)      SUBJECTIVE / OVERNIGHT EVENTS: No acute events overnight. Getting HD in room. States he feels lousy as he didnt get breakfast he ordered. Denies any new complaints.     MEDICATIONS  (STANDING):  apixaban 5 milliGRAM(s) Oral two times a day  cadexomer iodine 0.9% Gel 1 Application(s) Topical daily  calcitriol   Capsule 0.5 MICROGram(s) Oral daily  chlorhexidine 2% Cloths 1 Application(s) Topical two times a day  chlorhexidine 4% Liquid 1 Application(s) Topical <User Schedule>  epoetin mejia-epbx (RETACRIT) Injectable 26724 Unit(s) IV Push <User Schedule>  folic acid 1 milliGRAM(s) Oral daily  lidocaine   4% Patch 1 Patch Transdermal every 24 hours  metoclopramide 5 milliGRAM(s) Oral three times a day  multivitamin 1 Tablet(s) Oral daily  NIFEdipine XL 30 milliGRAM(s) Oral daily  pancrelipase  (CREON  6,000 Lipase Units) 1 Capsule(s) Oral three times a day with meals  pantoprazole    Tablet 40 milliGRAM(s) Oral every 12 hours  polyethylene glycol 3350 17 Gram(s) Oral two times a day  senna 2 Tablet(s) Oral at bedtime  sucralfate 1 Gram(s) Oral every 6 hours  thiamine 100 milliGRAM(s) Oral daily    MEDICATIONS  (PRN):  bisacodyl 5 milliGRAM(s) Oral every 12 hours PRN Constipation  ondansetron Injectable 4 milliGRAM(s) IV Push every 6 hours PRN Nausea and/or Vomiting  oxyCODONE    IR 10 milliGRAM(s) Oral every 4 hours PRN Severe Pain (7 - 10)  sodium chloride 0.9% lock flush 10 milliLiter(s) IV Push every 1 hour PRN Pre/post blood products, medications, blood draw, and to maintain line patency  tiZANidine 2 milliGRAM(s) Oral every 8 hours PRN muscle cramps      Vital Signs Last 24 Hrs  T(C): 36.3 (20 Feb 2023 11:55), Max: 36.9 (19 Feb 2023 15:09)  T(F): 97.4 (20 Feb 2023 11:55), Max: 98.5 (19 Feb 2023 15:09)  HR: 75 (20 Feb 2023 11:55) (75 - 96)  BP: 143/79 (20 Feb 2023 11:55) (121/56 - 148/77)  BP(mean): --  RR: 18 (20 Feb 2023 11:55) (18 - 18)  SpO2: 100% (20 Feb 2023 05:45) (98% - 100%)    Parameters below as of 20 Feb 2023 11:55  Patient On (Oxygen Delivery Method): room air      I&O's Summary    20 Feb 2023 07:01  -  20 Feb 2023 13:47  --------------------------------------------------------  IN: 400 mL / OUT: 1900 mL / NET: -1500 mL        PHYSICAL EXAM:  GENERAL: no apparent distress, on room air  EYES: EOMI, PERRLA, conjunctiva and sclera clear b/l  CHEST/LUNG: Clear to auscultation bilaterally; No wheezing or crackles  HEART: s1/s2, RRR, no murmurs appreciated  ABDOMEN: Soft, Nontender, Nondistended; Bowel sounds present  EXTREMITIES:  2+ Peripheral Pulses, No clubbing, cyanosis, or edema  MSK: no joint effusions  Back: no spinal tenderness  NEUROLOGY: awake, alert, responds to Qs appropriately, no sensory or motor deficits, 5/5 muscle strength equal in all extremities, CN 2-12 grossly intact  PSYCH: calm, cooperative  SKIN: No rashes or lesions    LABS:                                   8.2    5.65  )-----------( 249      ( 20 Feb 2023 08:50 )             27.6   02-20    142  |  110<H>  |  48<H>  ----------------------------<  87  3.6   |  19<L>  |  4.51<H>    Ca    8.1<L>      20 Feb 2023 08:50  Phos  3.6     02-20  Mg     1.50     02-20                RADIOLOGY & ADDITIONAL TESTS:

## 2023-02-20 NOTE — PROGRESS NOTE ADULT - PROBLEM SELECTOR PROBLEM 3
Esophageal stricture
Pancreatitis
Anemia secondary to renal failure
Anemia secondary to renal failure
Deep vein thrombosis (DVT) of right upper extremity
Hyperphosphatemia
Hypocalcemia
Pancreatitis
Anemia secondary to renal failure
Anemia secondary to renal failure
Lung abscess
Anemia secondary to renal failure
Anemia secondary to renal failure
Deep vein thrombosis (DVT) of right upper extremity
Hypocalcemia
Hypophosphatemia
Metabolic acidosis
Pancreatitis
Acute kidney injury superimposed on CKD
Deep vein thrombosis (DVT) of right upper extremity
Pancreatitis
Anemia secondary to renal failure
Anemia secondary to renal failure
Deep vein thrombosis (DVT) of right upper extremity
Deep vein thrombosis (DVT) of right upper extremity
Esophageal stricture
2019 novel coronavirus disease (COVID-19)
Anemia secondary to renal failure
Anemia secondary to renal failure
Hypocalcemia
Pancreatitis
Anemia secondary to renal failure
Anemia secondary to renal failure
Deep vein thrombosis (DVT) of right upper extremity
Deep vein thrombosis (DVT) of right upper extremity
Hypophosphatemia
Anemia secondary to renal failure
Hyperphosphatemia
Anemia secondary to renal failure
Esophageal stricture
Hypocalcemia
Hypokalemia
Pancreatitis
Pancreatitis
Acute kidney injury superimposed on CKD
Anemia secondary to renal failure
Anemia secondary to renal failure
Hypophosphatemia
Lung abscess
Deep vein thrombosis (DVT) of right upper extremity
Acute kidney injury superimposed on CKD
Acute kidney injury superimposed on CKD
Anemia secondary to renal failure
Deep vein thrombosis (DVT) of right upper extremity
Esophageal stricture
Hyperphosphatemia
Acute kidney injury superimposed on CKD
Acute kidney injury superimposed on CKD
Anemia secondary to renal failure
Lung abscess
Deep vein thrombosis (DVT) of right upper extremity
Acute kidney injury superimposed on CKD
Deep vein thrombosis (DVT) of right upper extremity
Pancreatitis
Pancreatitis
Acute kidney injury superimposed on CKD
Deep vein thrombosis (DVT) of right upper extremity
Deep vein thrombosis (DVT) of right upper extremity
Pancreatitis
2019 novel coronavirus disease (COVID-19)
Deep vein thrombosis (DVT) of right upper extremity
Deep vein thrombosis (DVT) of right upper extremity
Pancreatitis
Esophageal stricture
Pancreatitis
2019 novel coronavirus disease (COVID-19)
Deep vein thrombosis (DVT) of right upper extremity
Acute kidney injury superimposed on CKD
Deep vein thrombosis (DVT) of right upper extremity
Deep vein thrombosis (DVT) of right upper extremity
Esophageal stricture
2019 novel coronavirus disease (COVID-19)
2019 novel coronavirus disease (COVID-19)
Esophageal stricture
Lung abscess
Deep vein thrombosis (DVT) of right upper extremity

## 2023-02-20 NOTE — PROGRESS NOTE ADULT - REASON FOR ADMISSION
intractable N/V , esophageal stent removal
intractable N/V , esophageal stent removal- Transfer from [facility]
intractable N/V , esophageal stent removal

## 2023-02-20 NOTE — DISCHARGE NOTE NURSING/CASE MANAGEMENT/SOCIAL WORK - PATIENT PORTAL LINK FT
You can access the FollowMyHealth Patient Portal offered by [facility] by registering at the following website: [email or web address]. By joining [employer]’s FollowMyHealth portal, you will also be able to view your health information using other applications (apps) compatible with our system.

## 2023-02-20 NOTE — PROGRESS NOTE ADULT - ASSESSMENT
62M EtoH misuse c/b chronic pancreatitis, HCV s/p treatment (SVR), emphysema, benign esophageal stricture (s/p stent on 4/2022) admitted at [facility] w/ septic shock septic shock due to pseudomonal PNA, lung abscess, Morganella bacteremia and Klebsiella UTI (now s/p 6 wks zosyn ended 1/6) w/ hospital course c/b YUNIEL on CKD requiring HD and MICU stay, transferred here for intractable nausea/vomiting and esophageal stent removal, now s/p removal w/ EGD sig for esophagitis/gastric ulcer. Kidney function declined with progression to ESRD, s/p permacath placement and AVF placement with stage 2 revision completed. Hospital course c/b acute on chronic anemia requiring multiple transfusions. Also with skin breakdown in right toes secondary to vascular insufficiency s/p angioplasty with vascular. Further complicated by right UE DVT with limiting IV access now on eliquis.

## 2023-02-20 NOTE — PROGRESS NOTE ADULT - PROBLEM SELECTOR PROBLEM 11
Need for prophylactic measure
HTN (hypertension)
ETOH abuse
Need for prophylactic measure
Need for prophylactic measure
ETOH abuse
HTN (hypertension)
Hepatitis C
Need for prophylactic measure
Need for prophylactic measure
ETOH abuse
Hepatitis C
ETOH abuse
Need for prophylactic measure
ETOH abuse
HTN (hypertension)
Need for prophylactic measure
ETOH abuse
ETOH abuse
Hepatitis C
Need for prophylactic measure
Hepatitis C
ETOH abuse
ETOH abuse
HTN (hypertension)
HTN (hypertension)
ETOH abuse
ETOH abuse
HTN (hypertension)
HTN (hypertension)
Need for prophylactic measure
HTN (hypertension)
Need for prophylactic measure
ETOH abuse
HTN (hypertension)
ETOH abuse
Hepatitis C
ETOH abuse
ETOH abuse
Need for prophylactic measure
ETOH abuse
ETOH abuse
Hepatitis C
Hepatitis C
ETOH abuse

## 2023-02-20 NOTE — PROGRESS NOTE ADULT - NUTRITIONAL ASSESSMENT
This patient has been assessed with a concern for Malnutrition and has been determined to have a diagnosis/diagnoses of Severe protein-calorie malnutrition and Underweight (BMI < 19).    This patient is being managed with:   Diet Regular-  For patients receiving Renal Replacement - No Protein Restr No Conc K No Conc Phos Low Sodium (RENAL)  No Caffeine  Supplement Feeding Modality:  Oral  Suplena Cans or Servings Per Day:  2       Frequency:  Daily  Entered: Jan 14 2023  1:58PM    
This patient has been assessed with a concern for Malnutrition and has been determined to have a diagnosis/diagnoses of Severe protein-calorie malnutrition and Underweight (BMI < 19).    This patient is being managed with:   Diet Clear Liquid-  For patients receiving Renal Replacement - No Protein Restr No Conc K No Conc Phos Low Sodium (RENAL)  Entered: Jan 11 2023  9:36AM    
This patient has been assessed with a concern for Malnutrition and has been determined to have a diagnosis/diagnoses of Severe protein-calorie malnutrition and Underweight (BMI < 19).    This patient is being managed with:   Diet Regular-  1000mL Fluid Restriction ([id number])  For patients receiving Renal Replacement - No Protein Restr No Conc K No Conc Phos Low Sodium (RENAL)  Low Sodium  No Caffeine  Supplement Feeding Modality:  Oral  Nepro Cans or Servings Per Day:  1       Frequency:  Two Times a day  Entered: Feb 10 2023  5:50PM    
This patient has been assessed with a concern for Malnutrition and has been determined to have a diagnosis/diagnoses of Severe protein-calorie malnutrition and Underweight (BMI < 19).    This patient is being managed with:   Diet Regular-  Entered: Feb  3 2023  1:43PM    
This patient has been assessed with a concern for Malnutrition and has been determined to have a diagnosis/diagnoses of Severe protein-calorie malnutrition and Underweight (BMI < 19).    This patient is being managed with:   Diet Regular-  No Caffeine  Supplement Feeding Modality:  Oral  Ensure Plus High Protein Cans or Servings Per Day:  1       Frequency:  Three Times a day  Entered: Dec 28 2022 12:03PM    
This patient has been assessed with a concern for Malnutrition and has been determined to have a diagnosis/diagnoses of Severe protein-calorie malnutrition and Underweight (BMI < 19).    This patient is being managed with:   Diet Soft and Bite Sized-  Supplement Feeding Modality:  Oral  Ensure Plus High Protein Cans or Servings Per Day:  3       Frequency:  Three Times a day  Entered: Dec 24 2022 12:55PM    
This patient has been assessed with a concern for Malnutrition and has been determined to have a diagnosis/diagnoses of Severe protein-calorie malnutrition and Underweight (BMI < 19).    This patient is being managed with:   Diet Soft and Bite Sized-  Supplement Feeding Modality:  Oral  Ensure Plus High Protein Cans or Servings Per Day:  3       Frequency:  Three Times a day  Entered: Dec 24 2022 12:55PM    
This patient has been assessed with a concern for Malnutrition and has been determined to have a diagnosis/diagnoses of Severe protein-calorie malnutrition and Underweight (BMI < 19).    This patient is being managed with:   Diet Regular-  Entered: Feb  3 2023  1:43PM    
This patient has been assessed with a concern for Malnutrition and has been determined to have a diagnosis/diagnoses of Severe protein-calorie malnutrition and Underweight (BMI < 19).    This patient is being managed with:   Diet Regular-  Entered: Feb  3 2023  1:43PM    
This patient has been assessed with a concern for Malnutrition and has been determined to have a diagnosis/diagnoses of Severe protein-calorie malnutrition and Underweight (BMI < 19).    This patient is being managed with:   Diet Regular-  For patients receiving Renal Replacement - No Protein Restr No Conc K No Conc Phos Low Sodium (RENAL)  No Caffeine  Supplement Feeding Modality:  Oral  Suplena Cans or Servings Per Day:  2       Frequency:  Daily  Entered: Jan 14 2023  1:58PM    
This patient has been assessed with a concern for Malnutrition and has been determined to have a diagnosis/diagnoses of Severe protein-calorie malnutrition and Underweight (BMI < 19).    This patient is being managed with:   Diet Regular-  For patients receiving Renal Replacement - No Protein Restr No Conc K No Conc Phos Low Sodium (RENAL)  No Caffeine  Supplement Feeding Modality:  Oral  Suplena Cans or Servings Per Day:  2       Frequency:  Daily  Entered: Jan 14 2023  1:58PM    
This patient has been assessed with a concern for Malnutrition and has been determined to have a diagnosis/diagnoses of Severe protein-calorie malnutrition and Underweight (BMI < 19).    This patient is being managed with:   Diet Regular-  No Caffeine  Supplement Feeding Modality:  Oral  Ensure Plus High Protein Cans or Servings Per Day:  1       Frequency:  Three Times a day  Entered: Dec 28 2022 12:03PM    
This patient has been assessed with a concern for Malnutrition and has been determined to have a diagnosis/diagnoses of Severe protein-calorie malnutrition and Underweight (BMI < 19).    This patient is being managed with:   Diet Regular-  1000mL Fluid Restriction ([id number])  For patients receiving Renal Replacement - No Protein Restr No Conc K No Conc Phos Low Sodium (RENAL)  Low Sodium  No Caffeine  Supplement Feeding Modality:  Oral  Nepro Cans or Servings Per Day:  1       Frequency:  Two Times a day  Entered: Feb 10 2023  5:50PM    
This patient has been assessed with a concern for Malnutrition and has been determined to have a diagnosis/diagnoses of Severe protein-calorie malnutrition and Underweight (BMI < 19).    This patient is being managed with:   Diet Regular-  For patients receiving Renal Replacement - No Protein Restr No Conc K No Conc Phos Low Sodium (RENAL)  No Caffeine  Supplement Feeding Modality:  Oral  Suplena Cans or Servings Per Day:  2       Frequency:  Daily  Entered: Jan 14 2023  1:58PM    
This patient has been assessed with a concern for Malnutrition and has been determined to have a diagnosis/diagnoses of Severe protein-calorie malnutrition and Underweight (BMI < 19).    This patient is being managed with:   Diet Regular-  For patients receiving Renal Replacement - No Protein Restr No Conc K No Conc Phos Low Sodium (RENAL)  No Caffeine  Supplement Feeding Modality:  Oral  Suplena Cans or Servings Per Day:  2       Frequency:  Daily  Entered: Jan 14 2023  1:58PM    
This patient has been assessed with a concern for Malnutrition and has been determined to have a diagnosis/diagnoses of Severe protein-calorie malnutrition and Underweight (BMI < 19).    This patient is being managed with:   Diet NPO after Midnight-     NPO Start Date: 08-Feb-2023   NPO Start Time: 23:59  Except Medications  Entered: Feb 9 2023 12:15AM    Diet Regular-  Entered: Feb  3 2023  1:43PM    
This patient has been assessed with a concern for Malnutrition and has been determined to have a diagnosis/diagnoses of Severe protein-calorie malnutrition and Underweight (BMI < 19).    This patient is being managed with:   Diet NPO after Midnight-     NPO Start Date: 29-Dec-2022   NPO Start Time: 23:59  Except Medications  Entered: Dec 29 2022  3:26PM    Diet Regular-  No Caffeine  Supplement Feeding Modality:  Oral  Ensure Plus High Protein Cans or Servings Per Day:  1       Frequency:  Three Times a day  Entered: Dec 28 2022 12:03PM    
This patient has been assessed with a concern for Malnutrition and has been determined to have a diagnosis/diagnoses of Severe protein-calorie malnutrition and Underweight (BMI < 19).    This patient is being managed with:   Diet Regular-  1000mL Fluid Restriction ([id number])  For patients receiving Renal Replacement - No Protein Restr No Conc K No Conc Phos Low Sodium (RENAL)  Low Sodium  No Caffeine  Supplement Feeding Modality:  Oral  Nepro Cans or Servings Per Day:  1       Frequency:  Two Times a day  Entered: Feb 10 2023  5:50PM    
This patient has been assessed with a concern for Malnutrition and has been determined to have a diagnosis/diagnoses of Severe protein-calorie malnutrition and Underweight (BMI < 19).    This patient is being managed with:   Diet Regular-  For patients receiving Renal Replacement - No Protein Restr No Conc K No Conc Phos Low Sodium (RENAL)  No Caffeine  Supplement Feeding Modality:  Oral  Suplena Cans or Servings Per Day:  2       Frequency:  Daily  Entered: Jan 14 2023  1:58PM    
This patient has been assessed with a concern for Malnutrition and has been determined to have a diagnosis/diagnoses of Severe protein-calorie malnutrition and Underweight (BMI < 19).    This patient is being managed with:   Diet Soft and Bite Sized-  Supplement Feeding Modality:  Oral  Ensure Plus High Protein Cans or Servings Per Day:  3       Frequency:  Three Times a day  Entered: Dec 24 2022 12:55PM    
This patient has been assessed with a concern for Malnutrition and has been determined to have a diagnosis/diagnoses of Severe protein-calorie malnutrition and Underweight (BMI < 19).    This patient is being managed with:   Diet Regular-  DASH/TLC {Sodium & Cholesterol Restricted} (DASH)  Low Fat (LOWFAT)  Entered: Dec 18 2022  6:22PM    
This patient has been assessed with a concern for Malnutrition and has been determined to have a diagnosis/diagnoses of Severe protein-calorie malnutrition and Underweight (BMI < 19).    This patient is being managed with:   Diet Regular-  No Caffeine  Supplement Feeding Modality:  Oral  Ensure Plus High Protein Cans or Servings Per Day:  1       Frequency:  Three Times a day  Entered: Dec 28 2022 12:03PM    
This patient has been assessed with a concern for Malnutrition and has been determined to have a diagnosis/diagnoses of Severe protein-calorie malnutrition and Underweight (BMI < 19).    This patient is being managed with:   Diet Regular-  Supplement Feeding Modality:  Oral  Ensure Plus High Protein Cans or Servings Per Day:  1       Frequency:  Three Times a day  Entered: Dec 23 2022  3:03PM    
This patient has been assessed with a concern for Malnutrition and has been determined to have a diagnosis/diagnoses of Severe protein-calorie malnutrition and Underweight (BMI < 19).    This patient is being managed with:   Diet Regular-  Entered: Feb  3 2023  1:43PM    
This patient has been assessed with a concern for Malnutrition and has been determined to have a diagnosis/diagnoses of Severe protein-calorie malnutrition and Underweight (BMI < 19).    This patient is being managed with:   Diet NPO after Midnight-     NPO Start Date: 15-Feb-2023   NPO Start Time: 23:59  Except Medications  Entered: Feb 16 2023 12:34AM    Diet NPO after Midnight-     NPO Start Date: 15-Feb-2023   NPO Start Time: 23:59  Entered: Feb 15 2023  8:24PM    Diet Regular-  1000mL Fluid Restriction ([id number])  For patients receiving Renal Replacement - No Protein Restr No Conc K No Conc Phos Low Sodium (RENAL)  Low Sodium  No Caffeine  Supplement Feeding Modality:  Oral  Nepro Cans or Servings Per Day:  1       Frequency:  Two Times a day  Entered: Feb 10 2023  5:50PM    
This patient has been assessed with a concern for Malnutrition and has been determined to have a diagnosis/diagnoses of Severe protein-calorie malnutrition and Underweight (BMI < 19).    This patient is being managed with:   Diet Regular-  Entered: Feb  3 2023  1:43PM    
This patient has been assessed with a concern for Malnutrition and has been determined to have a diagnosis/diagnoses of Severe protein-calorie malnutrition and Underweight (BMI < 19).    This patient is being managed with:   Diet Soft and Bite Sized-  1000mL Fluid Restriction ([id number])  For patients receiving Renal Replacement - No Protein Restr No Conc K No Conc Phos Low Sodium (RENAL)  No Caffeine  Supplement Feeding Modality:  Oral  Nepro Cans or Servings Per Day:  1       Frequency:  Two Times a day  Entered: Jan 29 2023  5:12PM    
This patient has been assessed with a concern for Malnutrition and has been determined to have a diagnosis/diagnoses of Severe protein-calorie malnutrition and Underweight (BMI < 19).    This patient is being managed with:   Diet NPO after Midnight-     NPO Start Date: 08-Feb-2023   NPO Start Time: 23:59  Entered: Feb 8 2023  7:42AM    Diet NPO after Midnight-     NPO Start Date: 08-Feb-2023   NPO Start Time: 23:59  Entered: Feb 8 2023  6:43AM    Diet Regular-  Entered: Feb  3 2023  1:43PM    
This patient has been assessed with a concern for Malnutrition and has been determined to have a diagnosis/diagnoses of Severe protein-calorie malnutrition and Underweight (BMI < 19).    This patient is being managed with:   Diet Regular-  1000mL Fluid Restriction ([id number])  For patients receiving Renal Replacement - No Protein Restr No Conc K No Conc Phos Low Sodium (RENAL)  Low Sodium  No Caffeine  Supplement Feeding Modality:  Oral  Nepro Cans or Servings Per Day:  1       Frequency:  Two Times a day  Entered: Feb 10 2023  5:50PM    
This patient has been assessed with a concern for Malnutrition and has been determined to have a diagnosis/diagnoses of Severe protein-calorie malnutrition and Underweight (BMI < 19).    This patient is being managed with:   Diet Regular-  DASH/TLC {Sodium & Cholesterol Restricted} (DASH)  Low Fat (LOWFAT)  Entered: Dec 18 2022  6:22PM    
This patient has been assessed with a concern for Malnutrition and has been determined to have a diagnosis/diagnoses of Severe protein-calorie malnutrition and Underweight (BMI < 19).    This patient is being managed with:   Diet Regular-  1000mL Fluid Restriction ([id number])  For patients receiving Renal Replacement - No Protein Restr No Conc K No Conc Phos Low Sodium (RENAL)  Low Sodium  No Caffeine  Supplement Feeding Modality:  Oral  Nepro Cans or Servings Per Day:  1       Frequency:  Two Times a day  Entered: Feb 10 2023  5:50PM    
This patient has been assessed with a concern for Malnutrition and has been determined to have a diagnosis/diagnoses of Severe protein-calorie malnutrition and Underweight (BMI < 19).    This patient is being managed with:   Diet Soft and Bite Sized-  Supplement Feeding Modality:  Oral  Ensure Plus High Protein Cans or Servings Per Day:  3       Frequency:  Three Times a day  Entered: Dec 24 2022 12:55PM    
This patient has been assessed with a concern for Malnutrition and has been determined to have a diagnosis/diagnoses of Severe protein-calorie malnutrition and Underweight (BMI < 19).    This patient is being managed with:   Diet Soft and Bite Sized-  For patients receiving Renal Replacement - No Protein Restr No Conc K No Conc Phos Low Sodium (RENAL)  No Caffeine  Supplement Feeding Modality:  Oral  Nepro Cans or Servings Per Day:  1       Frequency:  Two Times a day  Entered: Jan 28 2023  3:13AM    
This patient has been assessed with a concern for Malnutrition and has been determined to have a diagnosis/diagnoses of Severe protein-calorie malnutrition and Underweight (BMI < 19).    This patient is being managed with:   Diet NPO after Midnight-     NPO Start Date: 19-Jan-2023   NPO Start Time: 23:59  Except Medications  Entered: Jan 20 2023  3:08AM    Diet Regular-  For patients receiving Renal Replacement - No Protein Restr No Conc K No Conc Phos Low Sodium (RENAL)  No Caffeine  Supplement Feeding Modality:  Oral  Suplena Cans or Servings Per Day:  2       Frequency:  Daily  Entered: Jan 14 2023  1:58PM    
This patient has been assessed with a concern for Malnutrition and has been determined to have a diagnosis/diagnoses of Severe protein-calorie malnutrition and Underweight (BMI < 19).    This patient is being managed with:   Diet Regular-  1000mL Fluid Restriction ([id number])  For patients receiving Renal Replacement - No Protein Restr No Conc K No Conc Phos Low Sodium (RENAL)  Low Sodium  No Caffeine  Supplement Feeding Modality:  Oral  Nepro Cans or Servings Per Day:  1       Frequency:  Two Times a day  Entered: Feb 10 2023  5:50PM    
This patient has been assessed with a concern for Malnutrition and has been determined to have a diagnosis/diagnoses of Severe protein-calorie malnutrition and Underweight (BMI < 19).    This patient is being managed with:   Diet Regular-  For patients receiving Renal Replacement - No Protein Restr No Conc K No Conc Phos Low Sodium (RENAL)  No Caffeine  Supplement Feeding Modality:  Oral  Suplena Cans or Servings Per Day:  2       Frequency:  Daily  Entered: Jan 9 2023  3:33PM    
This patient has been assessed with a concern for Malnutrition and has been determined to have a diagnosis/diagnoses of Severe protein-calorie malnutrition and Underweight (BMI < 19).    This patient is being managed with:   Diet Regular-  For patients receiving Renal Replacement - No Protein Restr No Conc K No Conc Phos Low Sodium (RENAL)  No Caffeine  Supplement Feeding Modality:  Oral  Suplena Cans or Servings Per Day:  2       Frequency:  Daily  Entered: Jan 14 2023  1:58PM    
This patient has been assessed with a concern for Malnutrition and has been determined to have a diagnosis/diagnoses of Severe protein-calorie malnutrition and Underweight (BMI < 19).    This patient is being managed with:   Diet Regular-  DASH/TLC {Sodium & Cholesterol Restricted} (DASH)  Low Fat (LOWFAT)  Entered: Dec 18 2022  6:22PM    
This patient has been assessed with a concern for Malnutrition and has been determined to have a diagnosis/diagnoses of Severe protein-calorie malnutrition and Underweight (BMI < 19).    This patient is being managed with:   Diet Soft and Bite Sized-  1000mL Fluid Restriction ([id number])  For patients receiving Renal Replacement - No Protein Restr No Conc K No Conc Phos Low Sodium (RENAL)  No Caffeine  Supplement Feeding Modality:  Oral  Nepro Cans or Servings Per Day:  1       Frequency:  Two Times a day  Entered: Jan 29 2023  5:12PM    
This patient has been assessed with a concern for Malnutrition and has been determined to have a diagnosis/diagnoses of Severe protein-calorie malnutrition and Underweight (BMI < 19).    This patient is being managed with:   Diet Regular-  1000mL Fluid Restriction ([id number])  For patients receiving Renal Replacement - No Protein Restr No Conc K No Conc Phos Low Sodium (RENAL)  Low Sodium  No Caffeine  Supplement Feeding Modality:  Oral  Nepro Cans or Servings Per Day:  1       Frequency:  Two Times a day  Entered: Feb 10 2023  5:50PM    
This patient has been assessed with a concern for Malnutrition and has been determined to have a diagnosis/diagnoses of Severe protein-calorie malnutrition and Underweight (BMI < 19).    This patient is being managed with:   Diet Regular-  No Caffeine  Supplement Feeding Modality:  Oral  Ensure Plus High Protein Cans or Servings Per Day:  1       Frequency:  Three Times a day  Entered: Dec 28 2022 12:03PM    
This patient has been assessed with a concern for Malnutrition and has been determined to have a diagnosis/diagnoses of Severe protein-calorie malnutrition and Underweight (BMI < 19).    This patient is being managed with:   Diet NPO after Midnight-     NPO Start Date: 12-Jan-2023   NPO Start Time: 23:59  Entered: Jan 12 2023  3:19PM    Diet Clear Liquid-  For patients receiving Renal Replacement - No Protein Restr No Conc K No Conc Phos Low Sodium (RENAL)  Entered: Jan 11 2023  9:36AM    
This patient has been assessed with a concern for Malnutrition and has been determined to have a diagnosis/diagnoses of Severe protein-calorie malnutrition and Underweight (BMI < 19).    This patient is being managed with:   Diet Regular-  1000mL Fluid Restriction ([id number])  For patients receiving Renal Replacement - No Protein Restr No Conc K No Conc Phos Low Sodium (RENAL)  Low Sodium  No Caffeine  Supplement Feeding Modality:  Oral  Nepro Cans or Servings Per Day:  1       Frequency:  Two Times a day  Entered: Feb 1 2023  6:32PM    
This patient has been assessed with a concern for Malnutrition and has been determined to have a diagnosis/diagnoses of Severe protein-calorie malnutrition and Underweight (BMI < 19).    This patient is being managed with:   Diet Soft and Bite Sized-  Supplement Feeding Modality:  Oral  Ensure Plus High Protein Cans or Servings Per Day:  3       Frequency:  Three Times a day  Entered: Dec 24 2022 12:55PM    
This patient has been assessed with a concern for Malnutrition and has been determined to have a diagnosis/diagnoses of Severe protein-calorie malnutrition and Underweight (BMI < 19).    This patient is being managed with:   Diet Regular-  1000mL Fluid Restriction ([id number])  For patients receiving Renal Replacement - No Protein Restr No Conc K No Conc Phos Low Sodium (RENAL)  Low Sodium  No Caffeine  Supplement Feeding Modality:  Oral  Nepro Cans or Servings Per Day:  1       Frequency:  Two Times a day  Entered: Feb 10 2023  5:50PM    
This patient has been assessed with a concern for Malnutrition and has been determined to have a diagnosis/diagnoses of Severe protein-calorie malnutrition and Underweight (BMI < 19).    This patient is being managed with:   Diet Regular-  1000mL Fluid Restriction ([id number])  For patients receiving Renal Replacement - No Protein Restr No Conc K No Conc Phos Low Sodium (RENAL)  Low Sodium  No Caffeine  Supplement Feeding Modality:  Oral  Nepro Cans or Servings Per Day:  1       Frequency:  Two Times a day  Entered: Feb 10 2023  5:50PM    
This patient has been assessed with a concern for Malnutrition and has been determined to have a diagnosis/diagnoses of Severe protein-calorie malnutrition and Underweight (BMI < 19).    This patient is being managed with:   Diet Regular-  No Caffeine  Supplement Feeding Modality:  Oral  Ensure Plus High Protein Cans or Servings Per Day:  1       Frequency:  Three Times a day  Entered: Dec 28 2022 12:03PM    
This patient has been assessed with a concern for Malnutrition and has been determined to have a diagnosis/diagnoses of Severe protein-calorie malnutrition and Underweight (BMI < 19).    This patient is being managed with:   Diet NPO after Midnight-     NPO Start Date: 29-Dec-2022   NPO Start Time: 23:59  Except Medications  Entered: Dec 29 2022  3:26PM    Diet Regular-  No Caffeine  Supplement Feeding Modality:  Oral  Ensure Plus High Protein Cans or Servings Per Day:  1       Frequency:  Three Times a day  Entered: Dec 28 2022 12:03PM    
This patient has been assessed with a concern for Malnutrition and has been determined to have a diagnosis/diagnoses of Severe protein-calorie malnutrition and Underweight (BMI < 19).    This patient is being managed with:   Diet Soft and Bite Sized-  For patients receiving Renal Replacement - No Protein Restr No Conc K No Conc Phos Low Sodium (RENAL)  No Caffeine  Supplement Feeding Modality:  Oral  Nepro Cans or Servings Per Day:  1       Frequency:  Two Times a day  Entered: Jan 28 2023  3:13AM    
This patient has been assessed with a concern for Malnutrition and has been determined to have a diagnosis/diagnoses of Severe protein-calorie malnutrition and Underweight (BMI < 19).    This patient is being managed with:   Diet Regular-  For patients receiving Renal Replacement - No Protein Restr No Conc K No Conc Phos Low Sodium (RENAL)  No Caffeine  Supplement Feeding Modality:  Oral  Suplena Cans or Servings Per Day:  2       Frequency:  Daily  Entered: Jan 14 2023  1:58PM    
This patient has been assessed with a concern for Malnutrition and has been determined to have a diagnosis/diagnoses of Severe protein-calorie malnutrition and Underweight (BMI < 19).    This patient is being managed with:   Diet NPO after Midnight-     NPO Start Date: 21-Dec-2022   NPO Start Time: 23:59  Entered: Dec 21 2022 10:12AM    Diet Regular-  DASH/TLC {Sodium & Cholesterol Restricted} (DASH)  Low Fat (LOWFAT)  Entered: Dec 18 2022  6:22PM    
This patient has been assessed with a concern for Malnutrition and has been determined to have a diagnosis/diagnoses of Severe protein-calorie malnutrition and Underweight (BMI < 19).    This patient is being managed with:   Diet Regular-  For patients receiving Renal Replacement - No Protein Restr No Conc K No Conc Phos Low Sodium (RENAL)  No Caffeine  Supplement Feeding Modality:  Oral  Suplena Cans or Servings Per Day:  2       Frequency:  Daily  Entered: Jan 14 2023  1:58PM    
This patient has been assessed with a concern for Malnutrition and has been determined to have a diagnosis/diagnoses of Severe protein-calorie malnutrition and Underweight (BMI < 19).    This patient is being managed with:   Diet Regular-  For patients receiving Renal Replacement - No Protein Restr No Conc K No Conc Phos Low Sodium (RENAL)  No Caffeine  Supplement Feeding Modality:  Oral  Suplena Cans or Servings Per Day:  2       Frequency:  Daily  Entered: Jan 14 2023  1:58PM    
This patient has been assessed with a concern for Malnutrition and has been determined to have a diagnosis/diagnoses of Severe protein-calorie malnutrition and Underweight (BMI < 19).    This patient is being managed with:   Diet NPO-  NPO for Procedure/Test     NPO Start Date: 26-Jan-2023   NPO Start Time: 23:59  Entered: Jan 26 2023  4:56PM    Diet Regular-  For patients receiving Renal Replacement - No Protein Restr No Conc K No Conc Phos Low Sodium (RENAL)  No Caffeine  Supplement Feeding Modality:  Oral  Suplena Cans or Servings Per Day:  2       Frequency:  Daily  Entered: Jan 14 2023  1:58PM    
This patient has been assessed with a concern for Malnutrition and has been determined to have a diagnosis/diagnoses of Severe protein-calorie malnutrition and Underweight (BMI < 19).    This patient is being managed with:   Diet Regular-  For patients receiving Renal Replacement - No Protein Restr No Conc K No Conc Phos Low Sodium (RENAL)  No Caffeine  Supplement Feeding Modality:  Oral  Suplena Cans or Servings Per Day:  2       Frequency:  Daily  Entered: Jan 14 2023  1:58PM    
This patient has been assessed with a concern for Malnutrition and has been determined to have a diagnosis/diagnoses of Severe protein-calorie malnutrition and Underweight (BMI < 19).    This patient is being managed with:   Diet Regular-  For patients receiving Renal Replacement - No Protein Restr No Conc K No Conc Phos Low Sodium (RENAL)  No Caffeine  Supplement Feeding Modality:  Oral  Suplena Cans or Servings Per Day:  2       Frequency:  Daily  Entered: Jan 14 2023  1:58PM    
This patient has been assessed with a concern for Malnutrition and has been determined to have a diagnosis/diagnoses of Severe protein-calorie malnutrition and Underweight (BMI < 19).    This patient is being managed with:   Diet Regular-  No Caffeine  Supplement Feeding Modality:  Oral  Ensure Plus High Protein Cans or Servings Per Day:  1       Frequency:  Three Times a day  Entered: Dec 28 2022 12:03PM    
This patient has been assessed with a concern for Malnutrition and has been determined to have a diagnosis/diagnoses of Severe protein-calorie malnutrition and Underweight (BMI < 19).    This patient is being managed with:   Diet Regular-  No Caffeine  Supplement Feeding Modality:  Oral  Ensure Plus High Protein Cans or Servings Per Day:  1       Frequency:  Three Times a day  Entered: Dec 28 2022 12:03PM    
This patient has been assessed with a concern for Malnutrition and has been determined to have a diagnosis/diagnoses of Severe protein-calorie malnutrition and Underweight (BMI < 19).    This patient is being managed with:   Diet Regular-  Entered: Feb  3 2023  1:43PM    
This patient has been assessed with a concern for Malnutrition and has been determined to have a diagnosis/diagnoses of Severe protein-calorie malnutrition and Underweight (BMI < 19).    This patient is being managed with:   Diet Regular-  1000mL Fluid Restriction ([id number])  For patients receiving Renal Replacement - No Protein Restr No Conc K No Conc Phos Low Sodium (RENAL)  Low Sodium  No Caffeine  Supplement Feeding Modality:  Oral  Nepro Cans or Servings Per Day:  1       Frequency:  Two Times a day  Entered: Feb 10 2023  5:50PM    
This patient has been assessed with a concern for Malnutrition and has been determined to have a diagnosis/diagnoses of Severe protein-calorie malnutrition and Underweight (BMI < 19).    This patient is being managed with:   Diet Regular-  For patients receiving Renal Replacement - No Protein Restr No Conc K No Conc Phos Low Sodium (RENAL)  No Caffeine  Supplement Feeding Modality:  Oral  Suplena Cans or Servings Per Day:  2       Frequency:  Daily  Entered: Jan 14 2023  1:58PM    
This patient has been assessed with a concern for Malnutrition and has been determined to have a diagnosis/diagnoses of Severe protein-calorie malnutrition and Underweight (BMI < 19).    This patient is being managed with:   Diet Soft and Bite Sized-  1000mL Fluid Restriction ([id number])  For patients receiving Renal Replacement - No Protein Restr No Conc K No Conc Phos Low Sodium (RENAL)  No Caffeine  Supplement Feeding Modality:  Oral  Nepro Cans or Servings Per Day:  1       Frequency:  Two Times a day  Entered: Jan 29 2023  5:12PM    
This patient has been assessed with a concern for Malnutrition and has been determined to have a diagnosis/diagnoses of Severe protein-calorie malnutrition and Underweight (BMI < 19).    This patient is being managed with:   Diet NPO after Midnight-     NPO Start Date: 12-Jan-2023   NPO Start Time: 23:59  Entered: Jan 12 2023  3:19PM    Diet Clear Liquid-  For patients receiving Renal Replacement - No Protein Restr No Conc K No Conc Phos Low Sodium (RENAL)  Entered: Jan 11 2023  9:36AM    
This patient has been assessed with a concern for Malnutrition and has been determined to have a diagnosis/diagnoses of Severe protein-calorie malnutrition and Underweight (BMI < 19).    This patient is being managed with:   Diet Regular-  Entered: Feb  3 2023  1:43PM    
This patient has been assessed with a concern for Malnutrition and has been determined to have a diagnosis/diagnoses of Severe protein-calorie malnutrition and Underweight (BMI < 19).    This patient is being managed with:   Diet Regular-  For patients receiving Renal Replacement - No Protein Restr No Conc K No Conc Phos Low Sodium (RENAL)  No Caffeine  Supplement Feeding Modality:  Oral  Suplena Cans or Servings Per Day:  2       Frequency:  Daily  Entered: Jan 14 2023  1:58PM    
This patient has been assessed with a concern for Malnutrition and has been determined to have a diagnosis/diagnoses of Severe protein-calorie malnutrition and Underweight (BMI < 19).    This patient is being managed with:   Diet Regular-  For patients receiving Renal Replacement - No Protein Restr No Conc K No Conc Phos Low Sodium (RENAL)  No Caffeine  Supplement Feeding Modality:  Oral  Suplena Cans or Servings Per Day:  2       Frequency:  Daily  Entered: Josiah 10 2023  2:18PM

## 2023-02-20 NOTE — PROGRESS NOTE ADULT - PROBLEM SELECTOR PROBLEM 1
ESRD on dialysis
Esophageal stricture
Acute kidney injury superimposed on CKD
Acute on chronic anemia
Esophageal stricture
Wound of right foot
Acute kidney injury superimposed on CKD
Acute kidney injury superimposed on CKD
Chronic systolic heart failure
Wound of right foot
Wound of right foot
Acute kidney injury superimposed on CKD
Acute on chronic anemia
ESRD on dialysis
Esophageal stricture
Esophageal stricture
Acute kidney injury superimposed on CKD
Esophageal stricture
Acute kidney injury superimposed on CKD
ESRD on dialysis
ESRD on dialysis
Wound of right foot
Wound of right foot
Acute kidney injury superimposed on CKD
Acute on chronic anemia
Wound of right foot
Wound of right foot
Acute kidney injury superimposed on CKD
Wound of right foot
Acute kidney injury superimposed on CKD
Acute kidney injury superimposed on CKD
Wound of right foot
Acute kidney injury superimposed on CKD
Acute on chronic anemia
Esophageal stricture
Wound of right foot
Acute kidney injury superimposed on CKD
Acute on chronic anemia
Esophageal stricture
Esophageal stricture
Acute kidney injury superimposed on CKD
Wound of right foot
Acute kidney injury superimposed on CKD
Acute on chronic anemia
Wound of right foot
Acute on chronic anemia
Chronic systolic heart failure
Esophageal stricture
Acute kidney injury superimposed on CKD
Wound of right foot
Esophageal stricture
Acute kidney injury superimposed on CKD
Wound of right foot
Wound of right foot
Esophageal stricture
Wound of right foot
Acute kidney injury superimposed on CKD
Acute kidney injury superimposed on CKD
Esophageal stricture
Esophageal stricture
Wound of right foot
Esophageal stricture
Wound of right foot
Acute kidney injury superimposed on CKD
Wound of right foot
Acute kidney injury superimposed on CKD
Wound of right foot
Acute kidney injury superimposed on CKD
Acute on chronic anemia
Acute on chronic anemia
Wound of right foot
Acute kidney injury superimposed on CKD

## 2023-02-20 NOTE — PROGRESS NOTE ADULT - PROBLEM SELECTOR PROBLEM 2
Hypocalcemia
Hypokalemia
Diarrhea
Hypocalcemia
Hypocalcemia
Anemia secondary to renal failure
Hypocalcemia
Hypokalemia
Anemia secondary to renal failure
Hypocalcemia
Pancreatitis
Wound of right foot
Acute kidney injury superimposed on CKD
Anemia secondary to renal failure
Anemia secondary to renal failure
Diarrhea
Hypocalcemia
Hypokalemia
Anemia secondary to renal failure
Hypocalcemia
Lung abscess
Wound of right foot
Acute on chronic anemia
Hypocalcemia
Wound of right foot
Wound of right foot
Acute on chronic anemia
Acute on chronic anemia
Chronic systolic heart failure
Hypocalcemia
Wound of right foot
Acute on chronic anemia
Acute on chronic anemia
Anemia secondary to renal failure
Hypocalcemia
Lung abscess
Lung abscess
Hypocalcemia
Hypocalcemia
Hypokalemia
Diarrhea
Hypocalcemia
Hypocalcemia
Pancreatitis
Wound of right foot
Acute kidney injury superimposed on CKD
Acute kidney injury superimposed on CKD
Septic shock
Chronic systolic heart failure
Delusions
Wound of right foot
Acute on chronic anemia
Acute on chronic anemia
Wound of right foot
Wound of right foot
Chronic systolic heart failure
Lung abscess
Lung abscess
Acute kidney injury superimposed on CKD
Acute kidney injury superimposed on CKD
Lung abscess
Lung abscess
Acute kidney injury superimposed on CKD
Acute kidney injury superimposed on CKD
Pancreatitis
Wound of right foot
Acute on chronic anemia
Acute on chronic anemia
Chronic systolic heart failure
Septic shock
Delusions
Diarrhea
Chronic systolic heart failure
Septic shock
Delusions
Septic shock
Acute kidney injury superimposed on CKD
Pancreatitis
Acute kidney injury superimposed on CKD
Septic shock
Wound of right foot
Acute kidney injury superimposed on CKD
Pancreatitis
Wound of right foot
Acute kidney injury superimposed on CKD
Pancreatitis

## 2023-02-20 NOTE — PROGRESS NOTE ADULT - PROBLEM SELECTOR PLAN 9
Septic shock due to pseudomonal PNA with lung abscess, morganella bacteremia and kelbsiella UTI.  Was on Zosyn since 11/25, as per ID in Health system who recommended 4 week course of antibiotics versus possibly longer pending imaging at 4 weeks (12/23)  - CXR 12/23 - Stable RUL lesion, with new RLL pneumonia   - seen by ID, completed 6 wks zosyn 1/6  - per ID no further imaging indicated as 6 wks abx sufficient treatment

## 2023-02-20 NOTE — PROGRESS NOTE ADULT - PROBLEM/PLAN-4
DISPLAY PLAN FREE TEXT

## 2023-02-20 NOTE — PROGRESS NOTE ADULT - SUBJECTIVE AND OBJECTIVE BOX
[facility] DIVISION OF KIDNEY DISEASES AND HYPERTENSION   FOLLOW UP NOTE  --------------------------------------------------------------------------------  Chief Complaint: YUNIEL on CKD/Ongoing hemodialysis requirement    24 hour events/subjective: Pt. was seen and evaluated during HD treatment today. His main concern was about his dietary restrictions. Denied shortness of breath, fevers, chills, nausea, vomiting.     PAST HISTORY  --------------------------------------------------------------------------------  No significant changes to PMH, PSH, FHx, SHx, unless otherwise noted    ALLERGIES & MEDICATIONS  --------------------------------------------------------------------------------  Allergies  Tylenol (Other)    Standing Inpatient Medications  apixaban 5 milliGRAM(s) Oral two times a day  cadexomer iodine 0.9% Gel 1 Application(s) Topical daily  calcitriol   Capsule 0.5 MICROGram(s) Oral daily  chlorhexidine 2% Cloths 1 Application(s) Topical two times a day  chlorhexidine 4% Liquid 1 Application(s) Topical <User Schedule>  epoetin mejia-epbx (RETACRIT) Injectable 31891 Unit(s) IV Push <User Schedule>  folic acid 1 milliGRAM(s) Oral daily  lidocaine   4% Patch 1 Patch Transdermal every 24 hours  metoclopramide 5 milliGRAM(s) Oral three times a day  multivitamin 1 Tablet(s) Oral daily  NIFEdipine XL 30 milliGRAM(s) Oral daily  pancrelipase  (CREON  6,000 Lipase Units) 1 Capsule(s) Oral three times a day with meals  pantoprazole    Tablet 40 milliGRAM(s) Oral every 12 hours  polyethylene glycol 3350 17 Gram(s) Oral two times a day  senna 2 Tablet(s) Oral at bedtime  sucralfate 1 Gram(s) Oral every 6 hours  thiamine 100 milliGRAM(s) Oral daily    PRN Inpatient Medications  bisacodyl 5 milliGRAM(s) Oral every 12 hours PRN  ondansetron Injectable 4 milliGRAM(s) IV Push every 6 hours PRN  oxyCODONE    IR 10 milliGRAM(s) Oral every 4 hours PRN  sodium chloride 0.9% lock flush 10 milliLiter(s) IV Push every 1 hour PRN  tiZANidine 2 milliGRAM(s) Oral every 8 hours PRN    REVIEW OF SYSTEMS  --------------------------------------------------------------------------------  Gen: no fever  Respiratory: no sob  CV: no cp  GI: no ab pain  : no complaints  MSK: +occasional B/L LE pain    All other systems were reviewed and are negative, except as noted.    VITALS/PHYSICAL EXAM  --------------------------------------------------------------------------------  T(C): 36.5 (02-20-23 @ 08:39), Max: 36.9 (02-19-23 @ 15:09)  HR: 78 (02-20-23 @ 08:39) (78 - 96)  BP: 137/71 (02-20-23 @ 08:39) (121/56 - 148/77)  RR: 18 (02-20-23 @ 08:39) (18 - 18)  SpO2: 100% (02-20-23 @ 05:45) (98% - 100%)  Wt(kg): --    Physical Exam:  	Gen: Sitting up in bed, undergoing HD and in NAD  	HEENT: no JVD  	Pulm: CTA B/L  	CV: S1S2,  	Abd: Soft,   	Ext: Trace B/L LE edema   	Neuro: Awake and alert  	Skin: Warm and dry              Vascular: L IJ tunneled HD catheter, L AVF + bruit over ACE wrap     LABS/STUDIES  --------------------------------------------------------------------------------              8.2    5.65  >-----------<  249      [02-20-23 @ 08:50]              27.6     142  |  110  |  48  ----------------------------<  87      [02-20-23 @ 08:50]  3.6   |  19  |  4.51        Ca     8.1     [02-20-23 @ 08:50]      Mg     1.50     [02-20-23 @ 08:50]      Phos  3.6     [02-20-23 @ 08:50]    Creatinine Trend:  SCr 4.51 [02-20 @ 08:50]  SCr 4.42 [02-18 @ 07:20]  SCr 4.83 [02-17 @ 06:30]  SCr 3.78 [02-16 @ 06:40]  SCr 5.12 [02-15 @ 06:30]

## 2023-03-14 NOTE — PROVIDER CONTACT NOTE (CRITICAL VALUE NOTIFICATION) - DATE AND TIME:
21-Jul-2022 17:25 21-Jul-2022 17:22 Body Location Override (Optional - Billing Will Still Be Based On Selected Body Map Location If Applicable): left mid mandible

## 2023-03-28 NOTE — PROGRESS NOTE ADULT - PROBLEM SELECTOR PROBLEM 8
Pt called because she had allergic reaction to Keflex and had hives and itching.  She has taken benadryl.  Discussed that if it gets worse or she has any swelling she needs to go to the ER.    Her last urine culture was pansensitive e.coli  Will send Macrobid according to sensitivities and her list of allergies.  She has taken macrobid before without issue.    ETOH abuse

## 2023-04-21 PROBLEM — S91.301A UNSPECIFIED OPEN WOUND, RIGHT FOOT, INITIAL ENCOUNTER: Chronic | Status: ACTIVE | Noted: 2023-01-01

## 2023-05-09 NOTE — PHYSICAL EXAM
[Normal] : normal rate, regular rhythm, normal S1 and S2 and no murmur heard [de-identified] : Large central scar with surgical staples present.

## 2023-05-09 NOTE — HISTORY OF PRESENT ILLNESS
[de-identified] : Patient is a 62 y.o M with PMHx of ETOH abuse (quit about 4-5 years ago), chronic pancreatitis, Hep C (past history of treatment), gout, gastric perf in 2019 (s/p repair), s/p [place] procedure 7/2022 presenting for follow up\par \par Patient has not been at the clinic in many months due to a prolonged hospital admission and rehab stay. Patient was admitted for Nausea and Vomiting back in December of 2022. Had prolonged hospital course complicated by Lung abscess treated with Abx, Toe wound formation 2/2 PAD s/p Baloon angioplasty c/b DVT, new HFrEF, and new onset ESRD on HD s/p AVF placement and Tunneled cath placement. After that patient had a multimonth stay at rehab. Patient notes that pain control was an issue the whole time. Of note the toe wound has healed and patient has adjusted well to Dialysis. AVF has matured and been used for 2 weeks as of this time. Patient notes only mild fatigue during HD. \par \par Patient also notes continued poorly controlled pain. ISTOP performed showing patient being prescribed 90mme equivalent by MD at [facility] (although in 10mg dose instead of 5mg). Patient notes that this is no longer enough to control his pain. Has a history of taking more medication than prescribed and concern for tolerance, opioid induced pain sensitivity, or even opioid use disorder are present. This has been difficult to manage due to patient being unable to schedule a visit with Pain management team. Will refer again to pain management as hopefully insurance will be accepted in the setting of dialysis use. Will reach out to [facility].\par \par Patient overall in good spirits although notes significant pain. Patient will attempt to get a new phone in case phone consultation is a possibility. Currently only has phone home. \par \par Very prolonged Hospital admission with Hospital course posted below\par Hospital Course:\par Discharge Date	20-Feb-2023\par Admission Date	15-Dec-2022 08:05\par Reason for Admission	intractable N/V , esophageal stent removal\par Hospital Course	\par 62M hx benign esophageal stricture s/p stent placement @ [facility] 4/2022 by Brian huddleston admitted to [facility]  w/ septic shock due to lung abscess presumed\par pseudomonal s/p 6 wks zosyn ended on 1/6 also c/b morganella bacteremia  and\par YUNIEL required HD (now off HD) and MICU stay, sent here for intractable N/V and\par stent removal. Endoscopy here sig for esophagitis and gastic ulcer, stent\par removed.  Started on PPI BID per GI recs. Hospital course c/b intermittent\par chest pain, EKG unchanged from prior, TTE showed new global LV dysfunction. NST\par sig for inferior wall motion abnormalitiy. Now s/p cath 1/4 w/ non-obstructive\par CAD. Hospital course further c/b diarrhea likely 2/2 pancreatic insuffiencey ,\par also non-adherent with medications including Creon.  Renal onboard for\par worsening renal function, recommended no indication for dialysis at this time\par but requested AVF placement, placed on 1/5. Also found to be COVID + during\par course and remains asymptomatic. He was found to have worsening Right toe\par wound, imaging was negative for OM, podiatry managed wound care. Angiogram done\par by vascular surgery on 1/20 with repeat baboon angioplasty on 2/3 with\par restoration of flow. His hospital course was further complicated by RUE DVT\par complicating his vascular access, he was started on heparin and transitioned to\par apixaban. He subsequently also had his stage 2 revision of AVF 2/9, but will\par continue to receive dialysis through tunneled catheter for the next few weeks\par while the fistula matures.  Patient is recommended [facility] saundra ongoing wound care\par and complex medical care. Hemodynamically stable for discharge to [facility] with\par follow up with GI, Renal, cards, vascular as outpatient.\par \par Return in one month for Annual

## 2023-05-10 NOTE — ED ADULT NURSE NOTE - RESPIRATORY ASSESSMENT
Patient is stable  and will continue present plan of care and reassess at next routine visit  All questions about this problem from patient were answered today  - - -

## 2023-05-11 NOTE — ED ADULT NURSE NOTE - COVID-19 ORDERING FACILITY
[FreeTextEntry1] : Discussed treatment options with patient.\par \par WBAT in supportive footwear, compression stockings/ankle brace whichever she finds more comfortable.\par Discussed visco-3 series, she declines. \par Steroid injection offered (for either peroneal tendons and/or ankle joint), patient declines. \par \par She will continue with home exercises.\par \par We discussed removal of hardware (posterior hardware) and peroneal exploration, possible debridement/tenolysis/repair with arthroscopic debridement of ankle joint. She would like to think about surgery.\par \par \par \par \par 
[facility]

## 2023-05-16 NOTE — ED ADULT NURSE NOTE - BEFAST BALANCE
Behavioral Health Psychotherapy Progress Note    Psychotherapy Provided: Individual Psychotherapy     1  Adjustment disorder with anxious mood            Goals addressed in session: Goal 1     DATA: Millie met with therapist for their individual session this morning  They shared that they have been doing well and showed therapist that they got new shoes, which their father took them for  They shared that their baby brother came along when they went, which was fun  Therapist assisted Krunal Mitchell with continuing to process their relationship with their stepmom, though they report that they have not had much interaction with her lately  Millie shared that they are using the beads she got them for [employer] the one year to make bracelets, bookmarks, etc  Krunal Mitchell shared that they will likely not be doing soccer this summer, due to financial reasons  Therapist assisted Krunal Mitchell with problem-solving, including exploring some activities to engage in to stay busy (e g  hanging out with friends, crocheting, making bracelets, reading, etc )  Therapist and Krunal Mitchell ended their session a little early today, due to Millie's class getting ready to go outside  During this session, this clinician used the following therapeutic modalities: Engagement Strategies, Client-centered Therapy, Solution-Focused Therapy and Supportive Psychotherapy    Substance Abuse was not addressed during this session  If the client is diagnosed with a co-occurring substance use disorder, please indicate any changes in the frequency or amount of use: N/A  Stage of change for addressing substance use diagnoses: No substance use/Not applicable    ASSESSMENT:  Lionell Canavan presents with a Euthymic/ normal mood  her affect is Normal range and intensity, which is congruent, with her mood and the content of the session  The client has made progress on their goals  Krnual Mitchell presented as open and cooperative during their session  "They demonstrate insight into their behaviors and thoughts and seem to be applying feedback to their life  They demonstrate a willingness to continue being more receptive to feedback  Marifer Stanford presents with a none risk of suicide, none risk of self-harm, and none risk of harm to others  For any risk assessment that surpasses a \"low\" rating, a safety plan must be developed  A safety plan was indicated: no  If yes, describe in detail N/A    PLAN: Between sessions, Marifer Stanford will continue engaging in coping skills and monitoring thoughts for distorted thinking patterns  At the next session, the therapist will use Engagement Strategies, Client-centered Therapy and Cognitive Behavioral Therapy to review psychoeducation on distorted thinking patterns  Behavioral Health Treatment Plan and Discharge Planning: Marifer Stanford is aware of and agrees to continue to work on their treatment plan  They have identified and are working toward their discharge goals   yes    Visit start and stop times:    05/16/23  Start Time: 1106  Stop Time: 1127  Total Visit Time: 21 minutes  " No

## 2023-06-06 PROBLEM — F11.90 OPIOID USE: Status: ACTIVE | Noted: 2022-02-09

## 2023-06-06 NOTE — REASON FOR VISIT
[Initial Consultation] : an initial pain management consultation [FreeTextEntry2] : referred by Dr. Hurtado for evaluation of back and abdominal pain

## 2023-06-06 NOTE — HISTORY OF PRESENT ILLNESS
[Home] : at home, [unfilled] , at the time of the visit. [Medical Office: ([facility])___] : at the medical office located in  [Verbal consent obtained from patient] : the patient, [unfilled] [Opioids for pain that has lasted more than 3 months, or past time of normal tissue healing (> 3 months)] : Opioids for pain that has lasted more than 3 months, or past time of normal tissue healing (> 3 months) [10 (pain as bad as you can imagine)] : 1. What number best describes your pain on average in the past week? 10/10 pain [8] : 2. What number best describes how, during the past week, pain has interfered with your enjoyment of life? 8/10 pain [> = 8 High Risk] : Opioid Risk Tool: > = 8 High Risk [50 - 90 MME/day (moderate risk) - consider increase frequency of follow-up and offering naloxone] : 50 - 90 MME/day (moderate risk) - consider increase frequency of follow-up and offering naloxone [I-Stop completed at today's visit] : I-Stop completed at today's visit [] : Sedation: Moderate [A discussion was had and/or printed educational materials were provided to the patient inclusive of safe treatment and optimization.] : A discussion was had and/or printed educational materials were provided to the patient inclusive of safe treatment and optimization. The treatment may include non-pharmacologic modalities or a combination of approaches.  We discussed risks, benefits, and alternatives associated with treatment [___ yrs] : [unfilled] year(s) ago [10] : an average pain level of 10/10 [Sharp] : sharp [Throbbing] : throbbing [Medications] : medications [FreeTextEntry1] : HPI\par \par Mr. IMELDA ROBERTSON is a 62 year M with pmhx of esophageal strictures sp stent placement, pancreatitis with significant abdominal pain that is constant and affects adls and ability to function.  In general he has lower back pain since accident decades ago. \par \par \par Previous and current pain medications/doses/effects:\par \par gabapentin - reports increased pain\par \par Tramadol ineffective\par \par Percocet (oxycodone/acetaminophen) most effective\par \par Previous Pain Treatments:\par \par na\par \par Previous Pain Injections:\par \par na\par \par Previous Diagnostic Studies/Images:\par \par \par \par  [FreeTextEntry7] : abdominal

## 2023-06-06 NOTE — ASSESSMENT
[FreeTextEntry1] : >> Imaging and Other Studies\par \par I personally reviewed the relevant imaging.  Discussed and explained to patient the likely source of pathology and pain.  Questions answered.\par \par back and leg pain likely secondary to lumbar radiculopathy and discogenic pain refractory to conservative treatments including 6 consecutive weeks of home exercises/PT, will obtain MRI LS to evaluate for pathology\par \par may consider PT vs intervention pending eval\par \par >> Therapy and Other Modalities\par \par >> Medications\par  \par Regarding opiate medication to manage pain. I had a detailed discussion with the patient regarding the risks of long-term opioid use, including the potential for medication side effects, hyperaglesia, endocrine dysfunction,  \par \par transition to oxycodone 5mg/acetaminophen 325mg q4h prn Patient reports that it was more effective for her pain than oxycodone alone\par \par Patient does not wish to pursue neuropathic pain treatment at this time because he reports that "it hurt him"\par \par recommend urine drug screen to evaluate safe opioid use\par \par naloxone available and provided\par \par >> Interventions\par \par may consider celiac plexus block - consider GI referral\par \par >> Consults\par \par GI referral celiac plexus block\par \par >> Discussion of Risks/Benefits/Alternatives\par \par 	>Regarding any scheduled procedures:\par \par I have discussed in detail with the patient that any interventional pain procedure is associated with potential risks.  The procedure may include an injection of steroids and potentially other medications (local anesthetic and normal saline) into the epidural space or surrounding tissue of the spine.  There are significant risks of this procedure which include and are not limited to infection, bleeding, worsening pain, dural puncture leading to postdural puncture headache, nerve damage, spinal cord injury, paralysis, stroke, and death.  \par \par There is a chance that the procedure does not improve their pain.  \par \par There are risks associated with the steroid being absorbed into the body systemically.  These include dysphoria, difficulty sleeping, mood swings and personality changes.  Premenopausal women may notice an irregularity in her menstrual cycle for 2-3 months following the injection.  Steroids can specifically affect patients with hypertension, diabetes, and peptic ulcers.  The procedure may cause a temporary increase in blood pressure and blood pressure, and may adversely affect a peptic ulcer.  Other, more rare complications, include avascular necrosis of joints, glaucoma and worsening of osteoporosis. \par \par I have discussed the risks of the procedure at length with the patient, and the potential benefits of pain relief.  I have offered alternatives to the procedure.  All questions were answered.  \par \par The patient expressed understanding and wishes to proceed with the procedure.\par \par 	>Regarding COVID19 Pandemic: \par \par Any planned interventional pain procedure are scheduled because further delay may cause harm or negative outcome to patient.  The goal in performing this procedure is to avoid deterioration of function, emergency room visits (which increases exposure) and reliance on opioids.  \par \par r/b/a discussed with patient, lack of evidence to conclusively determine whether pain management procedures have any positive or negative impact on the possibility of ion the virus and/or development of any sequelae. \par \par Patient counselled regarding timing steroid based intervention 2 weeks before or after COVID-19 vaccine administration to avoid any interaction or affect on efficacy of vaccination\par \par Patient demonstrates understanding\par \par Informed patient that risks associated with the COVID-19 infection.  Informed patient steps taken to limit the risks.  We are implementing safety precautions and following protocols consistent with the CDC and state recommendations. All patients and staff will be checked for fever or signs of illness upon entry to the facility. We will limit our steroid dose to the lowest effective therapeutic dose or in some cases steroids will not be injected at all. \par \par Patient agrees to proceed\par \par >> Conclusion\par \par The above diagnosis and treatment plan is medically reasonable and necessary based on the patient encounter \par There were no barriers to communication.\par Informed patient that I would be available for any additional questions.\par Patient was instructed to call with any worsening symptoms including severe pain, new numbness/weakness, or changes in the bowel/bladder function. \par Discussed role of nsaids in pain management and all relevant risks, if patient is continuing to require after 4 weeks the patient should f/u for alternative treatment. \par Instructed patient to maintain pain diary to monitor pain level, mobility, and function.\par \par The referring provider was informed of the above diagnosis and treatment plan.\par \par I explained to patient benefits and limitation of TeleMedicine visits\par \par Patient understands that limitations include inability to perform comprehensive physical exam, which may lead to potential diagnostic inconsistencies.  \par \par Any scheduled procedures are based on history, imaging and limited physical exam performed on TeleHealth visit.  If necessary, additional focal physical exam will be performed on date of procedure\par \par Patient understands that diagnosis and treatment may be limited by these inconsistencies and patient agrees to proceed with care plan\par \par \par

## 2023-06-06 NOTE — CONSULT LETTER
[Dear  ___] : Dear  [unfilled], [Consult Letter:] : I had the pleasure of evaluating your patient, [unfilled]. [Please see my note below.] : Please see my note below. [Consult Closing:] : Thank you very much for allowing me to participate in the care of this patient.  If you have any questions, please do not hesitate to contact me. [Sincerely,] : Sincerely, [FreeTextEntry3] : \par Paty Mcnamara DO, MBA\par Director, Pain Management Center\par  of Anesthesiology\par [facility] [employer] [facility]\par \par \par

## 2023-06-23 PROBLEM — R94.31 ABNORMAL ECG: Status: ACTIVE | Noted: 2023-01-01

## 2023-06-23 PROBLEM — I25.10 CAD (CORONARY ARTERY DISEASE): Status: ACTIVE | Noted: 2023-01-01

## 2023-06-23 NOTE — HISTORY OF PRESENT ILLNESS
[FreeTextEntry1] : Pleasant 62-year-old history of anemia, chronic kidney disease, chronic pancreatitis, history of esophageal stent in May 2022 previous septic shock and bacterial UTI, former smoker, coming in for cardiac assessment with history of heart failure reduced ejection fraction.  Prior labs and medical data reviewed. He underwent cardiac catheterization this past 1/4/2023 revealing mild LAD atherosclerosis 50% left circumflex disease as well as 50% RCA disease.  He had a 60% stenosis in the proximal third portion of the ramus intermedius.

## 2023-06-23 NOTE — CARDIOLOGY SUMMARY
[de-identified] : 1/4/2023 Coronary Angiography \par LM \par Left main artery: Angiography shows minor irregularities.  \par \par LAD \par Left anterior descending artery: Angiography shows mild\par atherosclerosis. HARI Flow 3.\par \par CX \par Circumflex: There is a 50 % stenosis in the middle third portion of\par the segment.\par \par RCA \par Right coronary artery: There is a 50 % stenosis in the middle third\par portion of the segment. HARI Flow 3.\par \par Ramus \par Ramus intermedius: There is a 60 % stenosis in the proximal third\par portion of the segment.

## 2023-06-23 NOTE — DISCUSSION/SUMMARY
[___ Week(s)] : in [unfilled] week(s) [FreeTextEntry3] : echo [FreeTextEntry1] : I have ordered an echocardiogram to assess LV function and valvular status.  Discussions about possibly adding aspirin suggested however this would be cautious recommendation given background history.  I recommended a heart healthy lifestyle including a low-saturated fat, low cholesterol diet with improved aerobic physical fitness over time for cardiovascular benefits.  Further details to follow after his echo was performed and reviewed.  Follow-up with you for care.  Sodium and starch restriction emphasized as well as fluid restriction. Marginal blood pressures precludes adding guideline directed heart failure therapies at present.

## 2023-07-13 NOTE — ED PROVIDER NOTE - PHYSICAL EXAMINATION
GEN: Awake, alert, interactive, NAD.  HEAD AND NECK: NC/AT. Airway patent. Neck supple.   EYES:  Clear b/l. EOMI. PERRL.   ENT: Moist mucus membranes.   CARDIAC: Regular rate, regular rhythm. No evident pedal edema.    RESP/CHEST: Normal respiratory effort with no use of accessory muscles or retractions. Clear throughout on auscultation.  ABD: Soft, non-distended, non-tender. No rebound, no guarding.   BACK: No midline spinal TTP. No CVAT.   EXTREMITIES: Moving all extremities with no apparent deformities. LUE AVF.   SKIN: Warm, dry, intact normal color. No rash.   NEURO: AOx3, CN II-XII grossly intact, no focal deficits.   PSYCH: Appropriate mood and affect.

## 2023-07-13 NOTE — ED PROVIDER NOTE - OBJECTIVE STATEMENT
62M PMH ESRD on HD (MWF), chronic back pain (prescribed percocet), hx pancreatis pw abd pain. Pt states ran out of percocet x3 days ago, his pain management doctor is out of town, and he has new PCP appt scheduled for 7/18. Pt reports his face & eyes swell when he does not have his pain medication. Denies fever, CP, SOB, palpitations, cough, N/V/D/C. Pt states he does make urine, denies dysuria / hematuria. Denies LE pain / swelling. Pt last went for HD yesterday. Pt requesting Dilaudid for pain.     PMH as above, PSH DARYL WILLIS, reports hx 21 surgeries - but does not know which surgeries he has had, reports allergy to Tylenol, meds as listed. Denies cigarette / EtOH use.

## 2023-07-13 NOTE — ED ADULT NURSE NOTE - OBJECTIVE STATEMENT
Pt arrived at ed via ambulance. PT complaint of abdominal. Pt  denies N/V/D sob or any discomfort. Pt said he  ran out of percocet, last took 3 days ago.  Dialyzed on Wednesday.  L-av shunt. Pmhx ESRD, Pancreatitis.

## 2023-07-13 NOTE — ED PROVIDER NOTE - CLINICAL SUMMARY MEDICAL DECISION MAKING FREE TEXT BOX
62M PMH HTN, EtOH abuse, ESRD on HD pw abd pain, back pain s/p running out of percocet 3 days ago. Afebrile, VSS. Well appearing, in NAD. Benign abd exam. Plan for CBC, CMP, lipase, CT AP w/ contrast. Pain control. Re-eval. 62M PMH HTN, EtOH abuse, ESRD on HD pw abd pain, back pain s/p running out of percocet 3 days ago. Afebrile, VSS. Well appearing, in NAD. Benign abd exam. Plan for CBC, CMP, lipase, CT AP w/ contrast. Pain control. Re-eval.  Surg recommends transfer to [facility] d/t complex surg hx, last scope Jan 2023, 62M PMH HTN, EtOH abuse, ESRD on HD pw abd pain, back pain s/p running out of percocet 3 days ago. Afebrile, VSS. Well appearing, in NAD. Benign abd exam. Plan for CBC, CMP, lipase, CT AP w/ contrast. Pain control. Re-eval.  W/u significant for: CT AP w/ emphysematous pancreatitis. Pt states last endoscopy was months ago. D/w Surgery - recommend transfer to [facility] d/t risk of decompensation and [facility] Pt w/ complicated PSH hx (ex-lap, omental patch 2/2 gastric perforation, Pustow procedure). D/w [facility] / transfer center medical director (Dr Joe) decline transfer as indication is in case of decompensation. Will admit to medicine (d/w Dr Cottrell). Pt updated to results, admission. He understands / agrees w/ this plan.

## 2023-07-13 NOTE — ED ADULT NURSE NOTE - NSICDXPASTMEDICALHX_GEN_ALL_CORE_FT
PAST MEDICAL HISTORY:  Alcohol abuse     Chronic kidney disease (CKD)     ETOH abuse sober x1 year approximately    Gout     Gout     H/O chronic pancreatitis     Hepatitis C treated    HTN (hypertension)     Pancreatitis     Wound of right foot

## 2023-07-13 NOTE — ED ADULT NURSE NOTE - NSFALLHARMRISKINTERV_ED_ALL_ED
Assistance OOB with selected safe patient handling equipment if applicable/Assistance with ambulation/Communicate risk of Fall with Harm to all staff, patient, and family/Monitor gait and stability/Provide visual cue: red socks, yellow wristband, yellow gown, etc/Reinforce activity limits and safety measures with patient and family/Bed in lowest position, wheels locked, appropriate side rails in place/Call bell, personal items and telephone in reach/Instruct patient to call for assistance before getting out of bed/chair/stretcher/Non-slip footwear applied when patient is off stretcher/[place] to call system/Physically safe environment - no spills, clutter or unnecessary equipment/Purposeful Proactive Rounding/Room/bathroom lighting operational, light cord in reach

## 2023-07-13 NOTE — ED ADULT TRIAGE NOTE - CHIEF COMPLAINT QUOTE
BIBA,  home,  pt c/o abd pain point to mid-abd.  PMH-pancreatitis .  denies N/V/D.  ran out of percocet, last took 3 days ago.  Dialyzed on Wednesday.  L-av shunt

## 2023-07-14 NOTE — CONSULT NOTE ADULT - SUBJECTIVE AND OBJECTIVE BOX
Subjective   This is an 80-year-old  female who presents to the emergency department with syncopal episode.  Her past medical history includes CHF, CKD, atrial fibrillation, sick sinus syndrome.  She does have a pacemaker, recently had a watchman's procedure.  She has recently been diagnosed with chronic myeloid leukemia.  She is on oral chemotherapy, gleevec.  This is new over the last 1 month.  Patient states that she was standing at [employer] and developed lightheadedness and the next thing she recalls is being woken up in a wheelchair.  Patient denies any chest pain, shortness of breath.  She does admit to increased lower extremity swelling over the last few days.  Recently they have decreased her Lasix medication.  She is currently being treated at [facility] for her newly found CML and for cardiac issues.  She sees a cardio oncologist next week for possible medication interactions.        History provided by:  Patient   used: No        Review of Systems   Constitutional: Negative for chills and fever.   HENT: Negative for congestion and sore throat.    Respiratory: Negative for cough and shortness of breath.    Cardiovascular: Negative for chest pain and palpitations.   Gastrointestinal: Negative for abdominal pain, nausea and vomiting.   Genitourinary: Negative for dysuria and hematuria.   Musculoskeletal: Negative for arthralgias and neck pain.   Skin: Negative for rash and wound.   Neurological: Positive for dizziness and syncope. Negative for weakness and headaches.   Hematological: Negative for adenopathy. Does not bruise/bleed easily.       Past Medical History:   Diagnosis Date   • Aortic stenosis    • Breast cancer (CMS/HCC)    • CAD (coronary artery disease)    • Cataract    • CHF (congestive heart failure) (CMS/[facility])     recently diagnosed after an ER visit.    • Chronic anticoagulation     Coumadin    • CKD (chronic kidney disease) stage 3, GFR 30-59 ml/min (CMS/HCC)  4/16/2018   • Diabetes mellitus (CMS/HCC)     Type II   • Elevated cholesterol    • GERD (gastroesophageal reflux disease)    • Hyperlipidemia    • Hypertension    • Macular degeneration    • Pancreatitis    • Paroxysmal atrial fibrillation (CMS/HCC)    • Pulmonary hypertension (CMS/HCC)    • Rheumatic fever     during childhood   • Sacrum and coccyx fracture (CMS/HCC)    • Sick sinus syndrome (CMS/HCC)     with pacemaker   • UTI (urinary tract infection)        Allergies   Allergen Reactions   • Dilaudid [Hydromorphone Hcl]    • Dilaudid [Hydromorphone] Nausea And Vomiting   • Enalapril Angioedema   • Janumet [Sitagliptin-Metformin Hcl] Other (See Comments)     Chest pain   • Lopid [Gemfibrozil] Nausea And Vomiting   • Sulfa Antibiotics Other (See Comments)     Syncope     • Ultram [Tramadol] Itching       Past Surgical History:   Procedure Laterality Date   • ANOMALOUS PULMONARY VENOUS RETURN REPAIR, TOTAL     • BACK SURGERY     • BELPHAROPTOSIS REPAIR     • CARDIAC CATHETERIZATION  1999, 2010   • CARDIAC CATHETERIZATION N/A 2/15/2017    Procedure: Left Heart Cath;  Surgeon: Ehsan Crocker MD;  Location:  [facility] CATH [facility]VAS[facility] LOC[facility];  Service:    • CARDIAC CATHETERIZATION N/A 2/15/2017    Procedure: Right Heart Cath;  Surgeon: Ehsan Crocker MD;  Location:  [facility];  Service:    • CARDIAC CATHETERIZATION N/A 2/15/2017    Procedure: Coronary angiography;  Surgeon: Ehsan Crocker MD;  Location:  [facility];  Service:    • CARDIAC CATHETERIZATION N/A 2/15/2017    Procedure: Left ventriculography;  Surgeon: Ehsan Crocker MD;  Location:  [facility];  Service:    • CARDIAC CATHETERIZATION N/A 2/15/2017    Procedure: Intracardiac echocardiogram;  Surgeon: Ehsan Crocker MD;  Location:  [facility];  Service:    • CARDIAC ELECTROPHYSIOLOGY PROCEDURE N/A 2/3/2017    Procedure: Pacemaker DC new;  Surgeon: Ehsan Crocker MD;  Location:  [facility] CATH INVASIVE  LOCATION;  Service:    • CARDIOVERSION     • CATARACT EXTRACTION     • CHOLECYSTECTOMY     • CORONARY ARTERY BYPASS GRAFT  1999    4 vessel    • CORONARY ARTERY BYPASS GRAFT     • CORONARY STENT PLACEMENT     • HYSTERECTOMY  1962   • INSERT / REPLACE / REMOVE PACEMAKER     • MASTECTOMY  2000   • OTHER SURGICAL HISTORY      TAVR 2017       Family History   Problem Relation Age of Onset   • Breast cancer Mother    • Coronary artery disease Father    • Heart attack Father    • Diabetes Father    • Cancer Brother    • No Known Problems Maternal Grandmother    • No Known Problems Maternal Grandfather    • No Known Problems Paternal Grandmother    • No Known Problems Paternal Grandfather        Social History     Socioeconomic History   • Marital status:      Spouse name: Not on file   • Number of children: Not on file   • Years of education: Not on file   • Highest education level: Not on file   Tobacco Use   • Smoking status: Never Smoker   • Smokeless tobacco: Never Used   Substance and Sexual Activity   • Alcohol use: No   • Drug use: No   • Sexual activity: No       Lab Results (last 24 hours)     Procedure Component Value Units Date/Time    CBC & Differential [[id number]] Collected:  03/06/19 2250    Specimen:  Blood Updated:  03/06/19 2305    Narrative:       The following orders were created for panel order CBC & Differential.  Procedure                               Abnormality         Status                     ---------                               -----------         ------                     Manual Differential[[id number]]                                                         CBC Auto Differential[[id number]]        Abnormal            Final result                 Please view results for these tests on the individual orders.    Comprehensive Metabolic Panel [[id number]]  (Abnormal) Collected:  03/06/19 2250    Specimen:  Blood Updated:  03/06/19 2313     Glucose 244 mg/dL      BUN 68 mg/dL       NEPHROLOGY CONSULTATION    CHIEF COMPLAINT:  ESRD    HPI:  62 yr old male with a pmh of ESRD on HD (MWF), chronic back pain (prescribed percocet), chronic pancreatitis, GERD, hx of ethanol abuse, DVT on eliquis, who presents with 3 days of progressive epigastric/abdominal pain. He reports the pain makes him more hungry. He ran out of his percocet 3 days ago and does not have an appointment with his PCP until 7/18.    ROS:  Denies  headache, dizziness, chest pain, palpitations, SOB, joint pain, diarrhea/constipation, urinary symptoms.       PAST MEDICAL & SURGICAL HISTORY:  ETOH abuse  sober x1 year approximately      Hepatitis C  treated      Gout      HTN (hypertension)      H/O chronic pancreatitis      Wound of right foot      ESRD on hemodialysis      DVT (deep venous thrombosis)      Gastric perforation        Social History:  Does not use tobacco products, consume alcohol or partake in illicit drug use at present  Lives with his mother (14 Jul 2023 05:18)      FAMILY HISTORY:  FH: HTN (hypertension)      Home Medications:  apixaban 5 mg oral tablet: 1 tab(s) orally 2 times a day (14 Jul 2023 05:30)  bisacodyl 5 mg oral delayed release tablet: 1 tab(s) orally every 12 hours, As needed, Constipation (14 Jul 2023 05:30)  cadexomer iodine 0.9% topical gel: 1 application topically once a day (14 Jul 2023 05:30)  calcitriol 0.5 mcg oral capsule: 1 cap(s) orally once a day (14 Jul 2023 05:30)  folic acid 1 mg oral tablet: 1 tab(s) orally once a day (14 Jul 2023 05:30)  lidocaine 4% topical film: Apply topically to affected area once a day (14 Jul 2023 05:30)  metoclopramide 5 mg oral tablet: 1 tab(s) orally 3 times a day (14 Jul 2023 05:30)  Multiple Vitamins oral tablet: 1 tab(s) orally once a day (14 Jul 2023 05:30)  NIFEdipine 30 mg oral tablet, extended release: 1 tab(s) orally once a day (14 Jul 2023 05:30)  oxyCODONE 10 mg oral tablet: 1 tab(s) orally every 4 hours, As needed, Severe Pain (7 - 10) (14 Jul 2023 05:30)  pancrelipase 6000 units-19,000 units-30,000 units oral delayed release capsule: 1 cap(s) orally 3 times a day (with meals) (14 Jul 2023 05:30)  pantoprazole 40 mg oral delayed release tablet: 1 tab(s) orally every 12 hours (14 Jul 2023 05:30)  polyethylene glycol 3350 oral powder for reconstitution: 17 gram(s) orally 2 times a day (14 Jul 2023 05:30)  senna leaf extract oral tablet: 2 tab(s) orally once a day (at bedtime) (14 Jul 2023 05:30)  sucralfate 1 g oral tablet: 1 tab(s) orally every 6 hours (14 Jul 2023 05:30)  thiamine 100 mg oral tablet: 1 tab(s) orally once a day (14 Jul 2023 05:30)  tiZANidine 2 mg oral tablet: 1 tab(s) orally every 8 hours, As needed, muscle cramps (14 Jul 2023 05:30)      MEDICATIONS  (STANDING):  apixaban 2.5 milliGRAM(s) Oral two times a day  meropenem  IVPB 500 milliGRAM(s) IV Intermittent every 24 hours      PHYSICAL EXAMINATION:    Conversant, no apparent distress  PERRLA, pink conjunctivae, no ptosis  Good dentition, no pharyngeal erythema  Neck non tender, no mass, no thyromegaly or nodules  Normal respiratory effort, lungs clear to auscultation  Heart with RRR, no murmurs or rubs, no peripheral edema  Abdomen soft, no masses, no organomegaly  Skin no rashes, ulcers or lesions, normal turgor and temperature  Appropriate affect, AO x 3    LABS:                        10.3   6.70  )-----------( 109      ( 13 Jul 2023 22:00 )             32.4     07-13    140  |  103  |  31<H>  ----------------------------<  73  3.7   |  27  |  4.47<H>    Ca    6.9<L>      13 Jul 2023 22:00    TPro  5.6<L>  /  Alb  2.2<L>  /  TBili  0.3  /  DBili  x   /  AST  63<H>  /  ALT  126<H>  /  AlkPhos  310<H>  07-13    Urinalysis Basic - ( 13 Jul 2023 22:00 )    Color: x / Appearance: x / SG: x / pH: x  Gluc: 73 mg/dL / Ketone: x  / Bili: x / Urobili: x   Blood: x / Protein: x / Nitrite: x   Leuk Esterase: x / RBC: x / WBC x   Sq Epi: x / Non Sq Epi: x / Bacteria: x        RADIOLOGY:  CT scan shows chronic pancreatitis with air throughout the parenchyma        ASSESSMENT:  1.  ESRD on hemodialysis MWF  2.  Acute/chronic pancreatitis    PLAN:  Will arrange HD today as ordered         Creatinine 1.66 mg/dL      Sodium 138 mmol/L      Potassium 4.2 mmol/L      Chloride 99 mmol/L      CO2 27.0 mmol/L      Calcium 9.5 mg/dL      Total Protein 7.2 g/dL      Albumin 4.30 g/dL      ALT (SGPT) 31 U/L      AST (SGOT) 39 U/L      Alkaline Phosphatase 124 U/L      Total Bilirubin 0.9 mg/dL      eGFR Non African Amer 30 mL/min/1.73      Globulin 2.9 gm/dL      A/G Ratio 1.5 g/dL      BUN/Creatinine Ratio 41.0     Anion Gap 12.0 mmol/L     Narrative:       The MDRD GFR formula is only valid for adults with stable renal function between ages 18 and 70.    Protime-INR [[id number]]  (Abnormal) Collected:  03/06/19 2250    Specimen:  Blood Updated:  03/06/19 2334     Protime 15.5 Seconds      INR 1.19    Troponin [[id number]]  (Normal) Collected:  03/06/19 2250    Specimen:  Blood Updated:  03/06/19 2323     Troponin I 0.022 ng/mL     CBC Auto Differential [[id number]]  (Abnormal) Collected:  03/06/19 2250    Specimen:  Blood Updated:  03/06/19 2305     WBC 7.16 10*3/mm3      RBC 3.34 10*6/mm3      Hemoglobin 10.0 g/dL      Hematocrit 31.1 %      MCV 93.1 fL      MCH 29.9 pg      MCHC 32.2 g/dL      RDW 15.5 %      RDW-SD 52.2 fl      MPV 13.4 fL      Platelets 507 10*3/mm3      Neutrophil % 82.3 %      Lymphocyte % 9.1 %      Monocyte % 5.3 %      Eosinophil % 2.4 %      Basophil % 0.6 %      Neutrophils, Absolute 5.90 10*3/mm3      Lymphocytes, Absolute 0.65 10*3/mm3      Monocytes, Absolute 0.38 10*3/mm3      Eosinophils, Absolute 0.17 10*3/mm3      Basophils, Absolute 0.04 10*3/mm3     BNP [[id number]]  (Abnormal) Collected:  03/06/19 2250    Specimen:  Blood Updated:  03/06/19 2323     proBNP 28,800.0 pg/mL     Urinalysis With Culture If Indicated - Urine, Clean Catch [[id number]]  (Abnormal) Collected:  03/07/19 0035    Specimen:  Urine, Clean Catch Updated:  03/07/19 0054     Color, UA Yellow     Appearance, UA Clear     pH, UA <=5.0     Specific Gravity, UA 1.017     Glucose, UA Negative     Ketones, UA  Negative     Bilirubin, UA Negative     Blood, UA Negative     Protein, UA Negative     Leuk Esterase, UA Small (1+)     Nitrite, UA Negative     Urobilinogen, UA 0.2 E.U./dL    Urinalysis, Microscopic Only - Urine, Clean Catch [[id number]]  (Abnormal) Collected:  03/07/19 0035    Specimen:  Urine, Clean Catch Updated:  03/07/19 0054     RBC, UA 0-2 /HPF      WBC, UA 3-5 /HPF      Bacteria, UA None Seen /HPF      Squamous Epithelial Cells, UA 13-20 /HPF      Hyaline Casts, UA 0-2 /LPF      Methodology Automated Microscopy          Objective   Physical Exam   Constitutional: She is oriented to person, place, and time. She appears well-developed and well-nourished. No distress.   HENT:   Head: Normocephalic and atraumatic.   Right Ear: External ear normal.   Left Ear: External ear normal.   Mouth/Throat: Oropharynx is clear and moist.   Eyes: Conjunctivae and EOM are normal. Pupils are equal, round, and reactive to light.   Neck: Normal range of motion.   Cardiovascular: Normal rate, regular rhythm, normal heart sounds and intact distal pulses. Exam reveals no friction rub.   No murmur heard.  Pulmonary/Chest: Effort normal and breath sounds normal. No respiratory distress. She has no wheezes. She has no rales. She exhibits no tenderness.   Abdominal: Soft. Bowel sounds are normal. She exhibits no distension and no mass. There is no tenderness. There is no rebound and no guarding.   Musculoskeletal: Normal range of motion.   Neurological: She is alert and oriented to person, place, and time.   Skin: Skin is warm and dry. Capillary refill takes less than 2 seconds. She is not diaphoretic.   Psychiatric: She has a normal mood and affect. Her behavior is normal.   Nursing note and vitals reviewed.      Procedures         XR Chest 1 View   Final Result   COPD. No acute cardiopulmonary abnormality. Stable 4 to 5 mm   nodular density in the right upper lobe possibly a small focus of   scarring. Evidence of previous aortic  "valve repair. No evidence of CHF.   This report was finalized on 03/06/2019 21:20 by Dr. Ehsan Waddell MD.      CT Head Without Contrast    (Results Pending)       /44   Pulse 61   Temp 97.2 °F (36.2 °C)   Resp 20   Ht 154.9 cm (61\")   Wt 49.9 kg (110 lb)   SpO2 98%   BMI 20.78 kg/m²     ED Course    ED Course as of Mar 07 0215   Wed Mar 06, 2019   2326 Impression     COPD. No acute cardiopulmonary abnormality. Stable 4 to 5 mm  nodular density in the right upper lobe possibly a small focus of  scarring. Evidence of previous aortic valve repair. No evidence of CHF.  This report was finalized on 03/06/2019 21:20 by Dr. hEsan Waddell MD.  Lab and Collection     XR Chest 1 View - 3/6/2019     [CP]   u Mar 07, 2019   0113 Leukocytes, UA: (!) Small (1+) [CP]      ED Course User Index  [CP] Jorje Azevedo PA-C       Medications - No data to display         MDM  Number of Diagnoses or Management Options  Artificial pacemaker:   CKD (chronic kidney disease) stage 3, GFR 30-59 ml/min (CMS/HCC):   Syncope, unspecified syncope type: new and requires workup  Thrombocytosis (CMS/HCC):   Diagnosis management comments: This is an 80-year-old with syncopal episode.  All labs are currently at her baseline including hemoglobin hematocrit, Bnp, and her chemistries.  Patient denies any chest pain, shortness of breath preceding the symptoms.  CT of the head and chest x-ray are unremarkable.  She is currently at her baseline.  No neuro deficits.  I did offer admission for further workup for syncopal episode.  Patient and daughter state they would rather go home and complete workup as an outpatient basis.  I did fully explain the risks and benefits of this.  They were okay with seeing Dr. Crocker cardiologist on Friday for further evaluation.  They do already have a scheduled appointment.  Time of discharge normal vital signs.        Amount and/or Complexity of Data Reviewed  Clinical lab tests: reviewed and " ordered  Tests in the radiology section of CPT®: reviewed and ordered  Tests in the medicine section of CPT®: reviewed  Decide to obtain previous medical records or to obtain history from someone other than the patient: yes  Review and summarize past medical records: yes  Independent visualization of images, tracings, or specimens: yes    Patient Progress  Patient progress: stable      Final diagnoses:   Syncope, unspecified syncope type   CKD (chronic kidney disease) stage 3, GFR 30-59 ml/min (CMS/[facility])   Artificial pacemaker   Thrombocytosis (CMS/[facility])          Jorje Azevedo PA-C  03/07/19 0216

## 2023-07-14 NOTE — CHART NOTE - NSCHARTNOTEFT_GEN_A_CORE
Per patient, surgeon team informed him earlier today that he was not getting surgery and can eat. Reached out to surgery team, per Surgery team: surgery has recommended bowel rest due to patient with pancreatitis and pain. Unable to state whether or not patient can be off npo. Recommend trending lipase. Discussed with Dr. Cerna, patient's pain greatly improved today and can start CLD for tonight. Resume npo after midnight.  Lipase ordered for AM.

## 2023-07-14 NOTE — PROGRESS NOTE ADULT - ASSESSMENT
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 Recommend non-narcotic strategies (gabapentin, tylenol) combined with short term narcotic use during the current exacerbation.   -     Pain management should proceed in a stepwise approach.   initial treatment begins with recommendations to stop alcohol and tobacco and to eat small meals that are low in fat. Discuss and advise    use of pancreatic enzyme supplements in patients with pain persisting after the above interventions (Grade 2C). These relieve pain in some patients and are generally safe.     Treatment with acid suppression (either with an H2 receptor blocker or a proton pump inhibitor) should be given along with pancreatic enzyme supplements to reduce inactivation from gastric acid.    -Analgesics with opiates and/or nonsteroidal antiinflammatory agents can be considered if pancreatic enzyme therapy fails to control pain. Adjuvant therapy with pregabalin can be considered in patients whose pain is not adequately controlled with opiates and/or nonsteroidal antiinflammatory agents.   Recommend non-narcotic strategies (gabapentin, tylenol) combined with short term narcotic use during the current exacerbation. discuss and advise and judicious use of narcotic   Surgery has generally been considered for patients who fail medical therapy.   Steatorrhea (fat malabsorption) may develop in patients with severe pancreatic exocrine dysfunction. Treatment depends upon the severity of disease.    -Dietary modification should begin with restriction of fat intake (to less than 20 g per day) discuss and advise   Medium chain triglycerides (MCTs) can provide extra calories in patients with weight loss and a poor response to diet and pancreatic enzyme therapy.   Glucose intolerance occurs with some frequency in chronic pancreatitis, but overt diabetes mellitus usually occurs late in the course of disease. A trial of oral hypoglycemic agents followed by insulin therapy when the need arises has been the line of management in these patients.  ?Management of other complications (such as pseudocysts, bile duct or duodenal obstruction, pancreatic ascites, splenic vein thrombosis, and pseudoaneurysms to be done if needed      long discussion and advise follow with pcp and gi as outpatient once better       ct finding as above with h/o Sequela of chronic pancreatitis. New foci of air throughout the pancreatic parenchyma  recent procedure   no wbc and    no fever  empirical antibiotic  surgery consulted

## 2023-07-14 NOTE — PATIENT PROFILE ADULT - FUNCTIONAL ASSESSMENT - BASIC MOBILITY 6.
3-calculated by average/Not able to assess (calculate score using [facility] averaging method)

## 2023-07-14 NOTE — H&P ADULT - NSHPREVIEWOFSYSTEMS_GEN_ALL_CORE
REVIEW OF SYSTEMS:    CONSTITUTIONAL: No weakness, fevers or chills  EYES/ENT: No visual changes;  No dysphagia; No sore throat; No rhinorrhea; No sinus pain/pressure  NECK: No pain or stiffness  RESPIRATORY: No cough, wheezing, hemoptysis; No shortness of breath  CARDIOVASCULAR: No chest pain or palpitations; No lower extremity edema  GASTROINTESTINAL: + abdominal/ epigastric pain. No nausea, vomiting, or hematemesis; No diarrhea or constipation. No melena or hematochezia.  GENITOURINARY: No dysuria, frequency or hematuria  NEUROLOGICAL: No numbness or weakness  MSK: ambulates without assistance   SKIN: No itching, burning, rashes, or lesions   All other review of systems is negative unless indicated above.

## 2023-07-14 NOTE — CONSULT NOTE ADULT - ASSESSMENT
62M PMHx ETOH abuse, chronic pancreatitis, HCV s/p treatment (SVR), emphysema, CHF, HTN, gastric perforation s/p ex lap, omental patch in 2019, benign esophageal stricture (s/p stent on 4/2022), [place] procedure 7/2022, septic shock septic shock due to pseudomonal PNA, lung abscess, Morganella bacteremia and Klebsiella UTI w/ hospital course c/b YUNIEL on CKD requiring HD and MICU stay, esophageal stent removal, EGD 1/13 sig for esophagitis/gastric ulcer, ESRD, s/p permacath placement and AVF placement with stage 2 revision completed, chronic anemia requiring multiple transfusions, skin breakdown in right toes secondary to vascular insufficiency s/p angioplasty with vascular, right UE DVT on Eliquis. Pt presents c/o epigastric pain x 3 days ever since he lost his Percocet.   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    Plan:  Transfer to [facility]  NPO, IVF  Recommend a Carbapenem   Discussed with Dr. Padilla  62M PMHx ETOH abuse, chronic pancreatitis, HCV s/p treatment (SVR), emphysema, CHF, HTN, gastric perforation s/p ex lap, omental patch in 2019, benign esophageal stricture (s/p stent 2022), [place] procedure 7/2022, admission from November to February initially at [facility] then transferred to [facility] in December involving septic shock due to pseudomonal PNA, lung abscess, Morganella bacteremia and Klebsiella UTI w/ hospital course c/b YUNIEL on CKD requiring HD and MICU stay, esophageal stent removal, EGD 1/13 sig for esophagitis/gastric ulcer, ESRD, s/p permacath placement and AVF placement with stage 2 revision completed, chronic anemia requiring multiple transfusions, skin breakdown in right toes secondary to vascular insufficiency s/p angioplasty with vascular, right UE DVT on Eliquis. Pt presents c/o epigastric pain x 3 days ever since he lost his Percocet.   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    Plan:  Transfer to [facility]  NPO, IVF  Recommend a Carbapenem   Discussed with Dr. Padilla  62M PMHx ETOH abuse, chronic pancreatitis, HCV s/p treatment (SVR), emphysema, CHF, HTN, gastric perforation s/p ex lap, omental patch in 2019, benign esophageal stricture (s/p stent 2022), [place] procedure 7/2022, [facility] then transferred to [facility] in December involving septic shock due to pseudomonal PNA, lung abscess, Morganella bacteremia and Klebsiella UTI w/ hospital course c/b YUNIEL on CKD requiring HD and MICU stay, esophageal stent removal, EGD 1/13 sig for esophagitis/gastric ulcer, ESRD, s/p permacath placement and AVF placement with stage 2 revision completed, chronic anemia requiring multiple transfusions, skin breakdown in right toes secondary to vascular insufficiency s/p angioplasty with vascular, right UE DVT on Eliquis. Pt presents c/o epigastric pain x 3 days ever since he lost his Percocet.   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    Plan:  Transfer to [facility]  NPO, IVF  Recommend a Carbapenem   Discussed with Dr. Padilla     Addendum:  Transfer attempted to [facility]. Transfer refused by medical director Dr. Patel. Discussed with Dr. Padilla.  62M PMHx ETOH abuse, chronic pancreatitis, HCV s/p treatment (SVR), emphysema, CHF, HTN, gastric perforation s/p ex lap, omental patch in 2019, benign esophageal stricture (s/p stent 2022), [place] procedure 7/2022, [facility] then [facility] to [facility] in December involving septic shock due to pseudomonal PNA, lung abscess, Morganella bacteremia and Klebsiella UTI w/ hospital course c/b YUNIEL on CKD requiring HD and MICU stay, esophageal stent removal, EGD 1/13 sig for esophagitis/gastric ulcer, ESRD, s/p permacath placement and AVF placement with stage 2 revision completed, chronic anemia requiring multiple transfusions, skin breakdown in right toes secondary to vascular insufficiency s/p angioplasty with vascular, right UE DVT on Eliquis. Pt presents c/o epigastric pain x 3 days ever since he lost his Percocet.   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    Plan:  Transfer to [facility]  NPO, IVF  Recommend a Carbapenem   Discussed with Dr. Padilla     Addendum:  Transfer attempted to [facility]. Transfer declined by medical director Dr. Patel. Discussed with Dr. Padilla.

## 2023-07-14 NOTE — H&P ADULT - NSHPSOCIALHISTORY_GEN_ALL_CORE
Does not use tobacco products, consume alcohol or partake in illicit drug use at present  Lives with his mother

## 2023-07-14 NOTE — PROGRESS NOTE ADULT - SUBJECTIVE AND OBJECTIVE BOX
Patient is a 62y old  Male who presents with a chief complaint of emphysematous pancreatitis (14 Jul 2023 10:35)      INTERVAL HPI/OVERNIGHT EVENTS:  abdominal pain slightly better  for dialysis's today  denies cp palpitation and sob.       MEDICATIONS  (STANDING):  apixaban 2.5 milliGRAM(s) Oral two times a day  meropenem  IVPB 500 milliGRAM(s) IV Intermittent every 24 hours    MEDICATIONS  (PRN):  acetaminophen     Tablet .. 650 milliGRAM(s) Oral every 6 hours PRN Temp greater or equal to 38C (100.4F), Mild Pain (1 - 3)  aluminum hydroxide/magnesium hydroxide/simethicone Suspension 30 milliLiter(s) Oral every 4 hours PRN Dyspepsia  melatonin 3 milliGRAM(s) Oral at bedtime PRN Insomnia  morphine  - Injectable 2 milliGRAM(s) IV Push every 4 hours PRN Severe Pain (7 - 10)  ondansetron Injectable 4 milliGRAM(s) IV Push every 8 hours PRN Nausea and/or Vomiting      Allergies    Tylenol (Other)    Intolerances        REVIEW OF SYSTEMS:  CONSTITUTIONAL: No fever, weight loss, or fatigue  EYES: No eye pain, visual disturbances, or discharge  ENMT:  No difficulty hearing, tinnitus, vertigo; No sinus or throat pain  NECK: No pain or stiffness  BREASTS: No pain, masses, or nipple discharge  RESPIRATORY: No cough, wheezing, chills or hemoptysis; No shortness of breath  CARDIOVASCULAR: No chest pain, palpitations, dizziness, or leg swelling  GASTROINTESTINAL: positive  abdominal  No nausea, vomiting, or hematemesis; No diarrhea or constipation. No melena or hematochezia.  GENITOURINARY: No dysuria, frequency, hematuria, or incontinence  NEUROLOGICAL: No headaches, memory loss, loss of strength, numbness, or tremors  SKIN: No itching, burning, rashes, or lesions   LYMPH NODES: No enlarged glands  ENDOCRINE: No heat or cold intolerance; No hair loss  MUSCULOSKELETAL: No joint pain or swelling; No muscle, back, or extremity pain  PSYCHIATRIC: No depression, anxiety, mood swings, or difficulty sleeping  HEME/LYMPH: No easy bruising, or bleeding gums  ALLERGY AND IMMUNOLOGIC: No hives or eczema    Vital Signs Last 24 Hrs  T(C): 36.8 (14 Jul 2023 12:25), Max: 36.8 (14 Jul 2023 12:25)  T(F): 98.2 (14 Jul 2023 12:25), Max: 98.2 (14 Jul 2023 12:25)  HR: 58 (14 Jul 2023 12:25) (58 - 87)  BP: 141/75 (14 Jul 2023 12:25) (123/81 - 159/73)  BP(mean): --  RR: 20 (14 Jul 2023 12:25) (16 - 20)  SpO2: 100% (14 Jul 2023 12:25) (95% - 100%)    Parameters below as of 14 Jul 2023 12:25  Patient On (Oxygen Delivery Method): room air        PHYSICAL EXAM:  GENERAL: NAD, well-groomed, well-developed  HEAD:  Atraumatic, Normocephalic  EYES: EOMI, PERRLA, conjunctiva and sclera clear  ENMT: No tonsillar erythema, exudates, or enlargement; Moist mucous membranes, Good dentition, No lesions  NECK: Supple, No JVD, Normal thyroid  NERVOUS SYSTEM:  Alert & Oriented X3, Good concentration; Motor Strength 5/5 B/L upper and lower extremities; DTRs 2+ intact and symmetric  CHEST/LUNG: Clear to percussion bilaterally; No rales, rhonchi, wheezing, or rubs  HEART: Regular rate and rhythm; No murmurs, rubs, or gallops  ABDOMEN: Soft, right upper quadrant tenderness deep palppation , Nondistended; Bowel sounds present  EXTREMITIES:  2+ Peripheral Pulses, No clubbing, cyanosis, or edema  LYMPH: No lymphadenopathy noted  SKIN: No rashes or lesions    LABS:                        10.8   4.71  )-----------( 128      ( 14 Jul 2023 13:45 )             32.7     07-13    140  |  103  |  31<H>  ----------------------------<  73  3.7   |  27  |  4.47<H>    Ca    6.9<L>      13 Jul 2023 22:00    TPro  5.6<L>  /  Alb  2.2<L>  /  TBili  0.3  /  DBili  x   /  AST  63<H>  /  ALT  126<H>  /  AlkPhos  310<H>  07-13      Urinalysis Basic - ( 13 Jul 2023 22:00 )    Color: x / Appearance: x / SG: x / pH: x  Gluc: 73 mg/dL / Ketone: x  / Bili: x / Urobili: x   Blood: x / Protein: x / Nitrite: x   Leuk Esterase: x / RBC: x / WBC x   Sq Epi: x / Non Sq Epi: x / Bacteria: x      CAPILLARY BLOOD GLUCOSE          RADIOLOGY & ADDITIONAL TESTS:    Imaging Personally Reviewed:  [ X] YES  [ ] NO    Consultant(s) Notes Reviewed:  [ X] YES  [ ] NO    Care Discussed with Consultants/Other Providers [X ] YES  [ ] NO

## 2023-07-14 NOTE — H&P ADULT - HISTORY OF PRESENT ILLNESS
62 yr old male with a pmh of ESRD on HD (MWF), chronic back pain (prescribed percocet), chronic pancreatitis, GERD, hx of ethanol abuse, DVT on eliquis, who presents with 3 days of progressive epigastric/abdominal pain. He reports the pain makes him more hungry. He ran out of his percocet 3 days ago and does not have an appointment with his PCP until 7/18.  Denies  headache, dizziness, chest pain, palpitations, SOB, joint pain, diarrhea/constipation, urinary symptoms.   Vitals: T 98, HR 77, /80, RR 18 satting 99% RA

## 2023-07-14 NOTE — PHARMACOTHERAPY INTERVENTION NOTE - COMMENTS
Adjusted meropenem timing to be administered in the evening after HD
Recommended dosing Apixaban 2.5mg BID because patient's SrCr > 1.5 and patient's weight is < 60kg.

## 2023-07-14 NOTE — CONSULT NOTE ADULT - SUBJECTIVE AND OBJECTIVE BOX
GENERAL SURGERY CONSULT NOTE    Patient is a 62 year old male who presents with a chief complaint of epigastric pain.      62M PMHx ETOH abuse, chronic pancreatitis, HCV s/p treatment (SVR), emphysema, CHF, HTN, gastric perforation s/p ex lap, omental patch in 2019, benign esophageal stricture (s/p stent on 4/2022), [place] procedure 7/2022, septic shock septic shock due to pseudomonal PNA, lung abscess, Morganella bacteremia and Klebsiella UTI w/ hospital course c/b YUNIEL on CKD requiring HD and MICU stay, esophageal stent removal, EGD 1/13 sig for esophagitis/gastric ulcer, ESRD, s/p permacath placement and AVF placement with stage 2 revision completed, chronic anemia requiring multiple transfusions, skin breakdown in right toes secondary to vascular insufficiency s/p angioplasty with vascular, right UE DVT on Eliquis. Pt presents c/o epigastric pain x 3 days ever since he lost his Percocet. Pain is currently controlled on morphine. Pt tolerated a sandwich in the ER. Patient denies fever, chills, nausea, vomiting, constipation, diarrhea, melena, hematochezia, dysuria, hematuria, chest pain, shortness of breath, dizziness, cough.     REVIEW OF SYSTEMS:  CONSTITUTIONAL: No fever, weight loss, or fatigue  EYES: No eye pain, visual disturbances, discharge  ENMT:  No difficulty hearing, tinnitus, vertigo; No sinus or throat pain  NECK: No pain or stiffness  BREASTS: No pain, masses, or nipple discharge  RESPIRATORY: No cough, wheezing, chills or hemoptysis; No shortness of breath  CARDIOVASCULAR: No chest pain, palpitations, dizziness, or leg swelling  GASTROINTESTINAL: +epigastric pain. No nausea, vomiting, or hematemesis; No diarrhea or constipation. No melena or hematochezia.  GENITOURINARY: No dysuria, frequency, hematuria, or incontinence  NEUROLOGICAL: No headaches, memory loss, loss of strength, numbness, or tremors  SKIN: No itching, burning, rashes, or lesions   LYMPH NODES: No enlarged glands  ENDOCRINE: No heat or cold intolerance; No hair loss  MUSCULOSKELETAL: No joint pain or swelling; No muscle, back, or extremity pain  PSYCHIATRIC: No depression, anxiety, mood swings, or difficulty sleeping  HEME/LYMPH: No easy bruising, or bleeding gums  ALLERY AND IMMUNOLOGIC: No hives or eczema     PAST MEDICAL & SURGICAL HISTORY:  ETOH abuse  sober x1 year approximately  Pancreatitis  Hepatitis C  treated  Gout  HTN (hypertension)  Chronic kidney disease (CKD)  H/O chronic pancreatitis  Gout  Alcohol abuse  Wound of right foot  Gastric perforation    MEDICATION: On Eliquis. See outpatient home medication.     Allergies  Tylenol (Other)    SOCIAL HISTORY: +ETOH abuse. Former smoker. Denies illicit drugs.            FAMILY HISTORY:  FH: HTN (hypertension)    Vital Signs Last 24 Hrs  T(C): 36.7 (13 Jul 2023 23:34), Max: 36.7 (13 Jul 2023 20:05)  T(F): 98 (13 Jul 2023 23:34), Max: 98.1 (13 Jul 2023 20:05)  HR: 76 (13 Jul 2023 23:34) (73 - 76)  BP: 136/78 (13 Jul 2023 23:34) (136/78 - 145/73)  BP(mean): --  RR: 16 (13 Jul 2023 23:34) (16 - 18)  SpO2: 99% (13 Jul 2023 23:34) (97% - 99%)    Parameters below as of 13 Jul 2023 20:05  Patient On (Oxygen Delivery Method): room air    PHYSICAL EXAM:  CONSTITUTIONAL: NAD, well-developed  HEAD:  Atraumatic, Normocephalic  EYES: Conjunctiva and sclera clear  ENMT: No tonsillar erythema, exudates, or enlargement; Moist mucous membranes, No lesions  NECK: Supple, No JVD, Normal thyroid  NERVOUS SYSTEM:  Alert & Oriented X3  RESPIRATORY: Clear to auscultation bilaterally; No rales, rhonchi, wheezing  CARDIOVASCULAR: Regular rate and rhythm. S1S2  GASTROINTESTINAL: Nondistended, +BS, soft, upper abdominal tenderness, no guarding, no rigidity   MUSCULOSKELETAL: 2+ Peripheral Pulses, No clubbing, cyanosis, or edema     LABS:                        10.3   6.70  )-----------( 109      ( 13 Jul 2023 22:00 )             32.4     07-13    140  |  103  |  31<H>  ----------------------------<  73  3.7   |  27  |  4.47<H>    Ca    6.9<L>      13 Jul 2023 22:00    TPro  5.6<L>  /  Alb  2.2<L>  /  TBili  0.3  /  DBili  x   /  AST  63<H>  /  ALT  126<H>  /  AlkPhos  310<H>  07-13      Urinalysis Basic - ( 13 Jul 2023 22:00 )    Color: x / Appearance: x / SG: x / pH: x  Gluc: 73 mg/dL / Ketone: x  / Bili: x / Urobili: x   Blood: x / Protein: x / Nitrite: x   Leuk Esterase: x / RBC: x / WBC x   Sq Epi: x / Non Sq Epi: x / Bacteria: x    < from: CT Abdomen and Pelvis w/ IV Cont (07.13.23 @ 23:24) >  FINDINGS:  Limited evaluation due to marked paucity of intraperitoneal fat.    LOWER CHEST: Increased nonspecific nodular airspace opacities in the   right lung base and resolved in the right middle lobe. Distal esophageal   wall thickening similar to the prior exam.    LIVER: Within normal limits.  BILE DUCTS: Mild intrahepatic biliary duct dilatation. Dilated CBC to 11   mm without radiopaque stone  GALLBLADDER: Within normal limits.  SPLEEN: Within normal limits.  PANCREAS: Chronic pancreatitis with atrophy of the gland and diffuse   coarse calcifications throughout. The main pancreatic duct does not   appear to be dilated. There are scattered foci of air throughout the   pancreatic parenchyma which is new from the prior exam. Limited   assessment for peripancreatic inflammatory changes due to paucity of fat.   No peripancreatic fluid collection.  ADRENALS: Within normal limits.  KIDNEYS/URETERS: Within normal limits.    BLADDER: Underdistended and suboptimally assessed.  REPRODUCTIVE ORGANS: Prostate appears to be enlarged.    BOWEL: No bowel obstruction. No evidence of appendicitis. No bowel wall   thickening.  PERITONEUM: No ascites.  VESSELS: Within normal limits.  RETROPERITONEUM/LYMPH NODES: No lymphadenopathy.  ABDOMINAL WALL: Within normal limits.  BONES: Within normal limits.    IMPRESSION:  Limited evaluation due to paucity of fat.    Sequela of chronic pancreatitis. New foci of air throughout the   pancreatic parenchyma, unclear if this is related to emphysematous   pancreatitis or possibly introduced if recently scoped/ERCP. Clinical   correlation is recommended.    Increased nonspecific patchy right lower lobe nodular opacities can be on   infectious or inflammatory basis.    --- End of Report ---    < end of copied text >     GENERAL SURGERY CONSULT NOTE    Patient is a 62 year old male who presents with a chief complaint of epigastric pain.      62M PMHx ETOH abuse, chronic pancreatitis, HCV s/p treatment (SVR), emphysema, CHF, HTN, gastric perforation s/p ex lap, omental patch in 2019, benign esophageal stricture (s/p stent 2022), [place] procedure 7/2022, admission from November to February initially at [facility] then transferred to [facility] in December involving septic shock due to pseudomonal PNA, lung abscess, Morganella bacteremia and Klebsiella UTI w/ hospital course c/b YUNIEL on CKD requiring HD and MICU stay, esophageal stent removal, EGD 1/13 sig for esophagitis/gastric ulcer, ESRD, s/p permacath placement and AVF placement with stage 2 revision completed, chronic anemia requiring multiple transfusions, skin breakdown in right toes secondary to vascular insufficiency s/p angioplasty with vascular, right UE DVT on Eliquis. Pt presents c/o epigastric pain x 3 days ever since he lost his Percocet. Pain is currently controlled on morphine. Pt tolerated a sandwich in the ER. Patient denies fever, chills, nausea, vomiting, constipation, diarrhea, melena, hematochezia, dysuria, hematuria, chest pain, shortness of breath, dizziness, cough.     REVIEW OF SYSTEMS:  CONSTITUTIONAL: No fever, weight loss, or fatigue  EYES: No eye pain, visual disturbances, discharge  ENMT:  No difficulty hearing, tinnitus, vertigo; No sinus or throat pain  NECK: No pain or stiffness  BREASTS: No pain, masses, or nipple discharge  RESPIRATORY: No cough, wheezing, chills or hemoptysis; No shortness of breath  CARDIOVASCULAR: No chest pain, palpitations, dizziness, or leg swelling  GASTROINTESTINAL: +epigastric pain. No nausea, vomiting, or hematemesis; No diarrhea or constipation. No melena or hematochezia.  GENITOURINARY: No dysuria, frequency, hematuria, or incontinence  NEUROLOGICAL: No headaches, memory loss, loss of strength, numbness, or tremors  SKIN: No itching, burning, rashes, or lesions   LYMPH NODES: No enlarged glands  ENDOCRINE: No heat or cold intolerance; No hair loss  MUSCULOSKELETAL: No joint pain or swelling; No muscle, back, or extremity pain  PSYCHIATRIC: No depression, anxiety, mood swings, or difficulty sleeping  HEME/LYMPH: No easy bruising, or bleeding gums  ALLERY AND IMMUNOLOGIC: No hives or eczema     PAST MEDICAL & SURGICAL HISTORY:  ETOH abuse  sober x1 year approximately  Pancreatitis  Hepatitis C  treated  Gout  HTN (hypertension)  Chronic kidney disease (CKD)  H/O chronic pancreatitis  Gout  Alcohol abuse  Wound of right foot  Gastric perforation    MEDICATION: On Eliquis. See outpatient home medication.     Allergies  Tylenol (Other)    SOCIAL HISTORY: +ETOH abuse. Former smoker. Denies illicit drugs.            FAMILY HISTORY:  FH: HTN (hypertension)    Vital Signs Last 24 Hrs  T(C): 36.7 (13 Jul 2023 23:34), Max: 36.7 (13 Jul 2023 20:05)  T(F): 98 (13 Jul 2023 23:34), Max: 98.1 (13 Jul 2023 20:05)  HR: 76 (13 Jul 2023 23:34) (73 - 76)  BP: 136/78 (13 Jul 2023 23:34) (136/78 - 145/73)  BP(mean): --  RR: 16 (13 Jul 2023 23:34) (16 - 18)  SpO2: 99% (13 Jul 2023 23:34) (97% - 99%)    Parameters below as of 13 Jul 2023 20:05  Patient On (Oxygen Delivery Method): room air    PHYSICAL EXAM:  CONSTITUTIONAL: NAD, well-developed  HEAD:  Atraumatic, Normocephalic  EYES: Conjunctiva and sclera clear  ENMT: No tonsillar erythema, exudates, or enlargement; Moist mucous membranes, No lesions  NECK: Supple, No JVD, Normal thyroid  NERVOUS SYSTEM:  Alert & Oriented X3  RESPIRATORY: Clear to auscultation bilaterally; No rales, rhonchi, wheezing  CARDIOVASCULAR: Regular rate and rhythm. S1S2  GASTROINTESTINAL: Nondistended, +BS, soft, upper abdominal tenderness, no guarding, no rigidity   MUSCULOSKELETAL: 2+ Peripheral Pulses, No clubbing, cyanosis, or edema     LABS:                        10.3   6.70  )-----------( 109      ( 13 Jul 2023 22:00 )             32.4     07-13    140  |  103  |  31<H>  ----------------------------<  73  3.7   |  27  |  4.47<H>    Ca    6.9<L>      13 Jul 2023 22:00    TPro  5.6<L>  /  Alb  2.2<L>  /  TBili  0.3  /  DBili  x   /  AST  63<H>  /  ALT  126<H>  /  AlkPhos  310<H>  07-13      Urinalysis Basic - ( 13 Jul 2023 22:00 )    Color: x / Appearance: x / SG: x / pH: x  Gluc: 73 mg/dL / Ketone: x  / Bili: x / Urobili: x   Blood: x / Protein: x / Nitrite: x   Leuk Esterase: x / RBC: x / WBC x   Sq Epi: x / Non Sq Epi: x / Bacteria: x    < from: CT Abdomen and Pelvis w/ IV Cont (07.13.23 @ 23:24) >  FINDINGS:  Limited evaluation due to marked paucity of intraperitoneal fat.    LOWER CHEST: Increased nonspecific nodular airspace opacities in the   right lung base and resolved in the right middle lobe. Distal esophageal   wall thickening similar to the prior exam.    LIVER: Within normal limits.  BILE DUCTS: Mild intrahepatic biliary duct dilatation. Dilated CBC to 11   mm without radiopaque stone  GALLBLADDER: Within normal limits.  SPLEEN: Within normal limits.  PANCREAS: Chronic pancreatitis with atrophy of the gland and diffuse   coarse calcifications throughout. The main pancreatic duct does not   appear to be dilated. There are scattered foci of air throughout the   pancreatic parenchyma which is new from the prior exam. Limited   assessment for peripancreatic inflammatory changes due to paucity of fat.   No peripancreatic fluid collection.  ADRENALS: Within normal limits.  KIDNEYS/URETERS: Within normal limits.    BLADDER: Underdistended and suboptimally assessed.  REPRODUCTIVE ORGANS: Prostate appears to be enlarged.    BOWEL: No bowel obstruction. No evidence of appendicitis. No bowel wall   thickening.  PERITONEUM: No ascites.  VESSELS: Within normal limits.  RETROPERITONEUM/LYMPH NODES: No lymphadenopathy.  ABDOMINAL WALL: Within normal limits.  BONES: Within normal limits.    IMPRESSION:  Limited evaluation due to paucity of fat.    Sequela of chronic pancreatitis. New foci of air throughout the   pancreatic parenchyma, unclear if this is related to emphysematous   pancreatitis or possibly introduced if recently scoped/ERCP. Clinical   correlation is recommended.    Increased nonspecific patchy right lower lobe nodular opacities can be on   infectious or inflammatory basis.    --- End of Report ---    < end of copied text >

## 2023-07-14 NOTE — H&P ADULT - NSHPLABSRESULTS_GEN_ALL_CORE
labs personally reviewed and pertinent [facility] documents/labs/diagnostics reviewed                         10.3   6.70  )-----------( 109      ( 13 Jul 2023 22:00 )             32.4       07-13    140  |  103  |  31<H>  ----------------------------<  73  3.7   |  27  |  4.47<H>    Ca    6.9<L>      13 Jul 2023 22:00    TPro  5.6<L>  /  Alb  2.2<L>  /  TBili  0.3  /  DBili  x   /  AST  63<H>  /  ALT  126<H>  /  AlkPhos  310<H>  07-13      Urinalysis Basic - ( 13 Jul 2023 22:00 )    Color: x / Appearance: x / SG: x / pH: x  Gluc: 73 mg/dL / Ketone: x  / Bili: x / Urobili: x   Blood: x / Protein: x / Nitrite: x   Leuk Esterase: x / RBC: x / WBC x   Sq Epi: x / Non Sq Epi: x / Bacteria: x      CT abdo/pelvis interpreted by radiology: Limited evaluation due to paucity of fat.    Sequela of chronic pancreatitis. New foci of air throughout the pancreatic parenchyma, unclear if this is related to emphysematous pancreatitis or possibly introduced if recently scoped/ERCP. Clinical correlation is recommended.    Increased nonspecific patchy right lower lobe nodular opacities can be on infectious or inflammatory basis.

## 2023-07-14 NOTE — H&P ADULT - NSICDXPASTMEDICALHX_GEN_ALL_CORE_FT
PAST MEDICAL HISTORY:  DVT (deep venous thrombosis)     ESRD on hemodialysis     ETOH abuse sober x1 year approximately    Gout     H/O chronic pancreatitis     Hepatitis C treated    HTN (hypertension)     Wound of right foot

## 2023-07-14 NOTE — PATIENT PROFILE ADULT - PRO INTERPRETER NEED 2
Pt called. States she has issues with her sinuses again.   States she feels ear pain and headache to the back of her head.   Took Allegra today, but not helping much.    No fever. No chills.   Feels allergy shots worsened her sinuses other than help.   Pt is requesting if she can be seen tomorrow.                   English

## 2023-07-14 NOTE — H&P ADULT - PROBLEM SELECTOR PLAN 1
New  CT abdo/pelvis: Sequela of chronic pancreatitis. New foci of air throughout the pancreatic parenchyma,  Surgery consulted: NPO, Kat, Transfer to [facility] was declined by medical director   Morphine 2mg IV Q4 PRN for severe pain

## 2023-07-15 NOTE — PROGRESS NOTE ADULT - ASSESSMENT
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 Recommend non-narcotic strategies (gabapentin, tylenol) combined with short term narcotic use during the current exacerbation.   -     Pain management should proceed in a stepwise approach.   initial treatment begins with recommendations to stop alcohol and tobacco and to eat small meals that are low in fat. Discuss and advise    use of pancreatic enzyme supplements in patients with pain persisting after the above interventions (Grade 2C). These relieve pain in some patients and are generally safe.     Treatment with acid suppression (either with an H2 receptor blocker or a proton pump inhibitor) should be given along with pancreatic enzyme supplements to reduce inactivation from gastric acid.    -Analgesics with opiates and/or nonsteroidal antiinflammatory agents can be considered if pancreatic enzyme therapy fails to control pain. Adjuvant therapy with pregabalin can be considered in patients whose pain is not adequately controlled with opiates and/or nonsteroidal antiinflammatory agents.   Recommend non-narcotic strategies (gabapentin, tylenol) combined with short term narcotic use during the current exacerbation. discuss and advise and judicious use of narcotic   Surgery has generally been considered for patients who fail medical therapy.   Steatorrhea (fat malabsorption) may develop in patients with severe pancreatic exocrine dysfunction. Treatment depends upon the severity of disease.    -Dietary modification should begin with restriction of fat intake (to less than 20 g per day) discuss and advise   Medium chain triglycerides (MCTs) can provide extra calories in patients with weight loss and a poor response to diet and pancreatic enzyme therapy.   Glucose intolerance occurs with some frequency in chronic pancreatitis, but overt diabetes mellitus usually occurs late in the course of disease. A trial of oral hypoglycemic agents followed by insulin therapy when the need arises has been the line of management in these patients.  ?Management of other complications (such as pseudocysts, bile duct or duodenal obstruction, pancreatic ascites, splenic vein thrombosis, and pseudoaneurysms to be done if needed      patient  clinicaly feels better        ct finding as above with h/o Sequela of chronic pancreatitis. New foci of air throughout the pancreatic parenchyma  recent procedure   no wbc and    no fever  clinically feels better and wanted to eat   long discussion and he is adamant in eating   empirical antibiotic  surgery consulted   gi consulted

## 2023-07-15 NOTE — PROGRESS NOTE ADULT - NS ATTEND AMEND GEN_ALL_CORE FT
Patient seen and examined with PA  Patient denies any nausea, vomiting ,fever or chills  Pain improved    On exam: awake, alert  Breathing comfortably on room air  Abd is soft ,not tender and not distended  No rebound, no guarding    - diet as tolerated  - no emergent surgical intervention required; would benefit from being transferred to [facility]  - continue management per primary team  - per patient, has not followed up with pancreatic surgeons since surgery, would recommend follow up with either Dr. Trejo or Dr. Pathak after discharge  - will sign off, please reconsult as needed Patient seen and examined with PA  Patient denies any nausea, vomiting ,fever or chills  Pain improved    On exam: awake, alert  Breathing comfortably on room air  Abd is soft ,not tender and not distended  No rebound, no guarding    - diet as tolerated  - no emergent surgical intervention required; if patient status changes, and requires surgical intervention; then would benefit from being transferred to [facility]  - continue management per primary team  - per patient, has not followed up with pancreatic surgeons since surgery, would recommend follow up with either Dr. Trejo or Dr. Pathak after discharge  - will sign off, please reconsult as needed

## 2023-07-15 NOTE — PROGRESS NOTE ADULT - SUBJECTIVE AND OBJECTIVE BOX
Pt seen and examined at bedside with Dr. Ryan. Pt states that since surgery a ywar ago with pancreatic surgeon he hasn't followup. Pt states his abdominal pain is 2/2 not having solid food, pt asking for reqular diet.     T(F): 97.4 (07-15-23 @ 10:53), Max: 98.7 (07-14-23 @ 15:40)  HR: 59 (07-15-23 @ 10:53) (58 - 65)  BP: 125/81 (07-15-23 @ 10:53) (114/66 - 146/78)  RR: 18 (07-15-23 @ 10:53) (16 - 20)  SpO2: 100% (07-15-23 @ 10:53) (98% - 100%)  Wt(kg): --  CAPILLARY BLOOD GLUCOSE      PHYSICAL EXAM:  CONSTITUTIONAL: NAD, slender  HEAD:  Atraumatic, Normocephalic  EYES: Conjunctiva and sclera clear  ENMT: No tonsillar erythema, exudates, or enlargement; Moist mucous membranes, No lesions  NECK: Supple, No JVD, Normal thyroid  NERVOUS SYSTEM:  Alert & Oriented X3  RESPIRATORY: Clear to auscultation bilaterally; No rales, rhonchi, wheezing  CARDIOVASCULAR: Regular rate and rhythm. S1S2  GASTROINTESTINAL: Nondistended, +BS, soft, mild mid-abdominal tenderness, no guarding, no rigidity   MUSCULOSKELETAL: 2+ Peripheral Pulses, No clubbing, cyanosis, or edema      LABS:                        11.7   4.49  )-----------( 125      ( 15 Jul 2023 05:47 )             35.5     07-15    137  |  103  |  25<H>  ----------------------------<  68<L>  3.7   |  26  |  3.51<H>    Ca    6.9<L>      15 Jul 2023 05:47    TPro  4.9<L>  /  Alb  1.9<L>  /  TBili  0.2  /  DBili  x   /  AST  83<H>  /  ALT  120<H>  /  AlkPhos  281<H>  07-15    Lipase: 19 U/L (07.15.23 @ 05:47)    I&O's Detail      62M PMHx ETOH abuse, chronic pancreatitis, HCV s/p treatment (SVR), emphysema, CHF, HTN, gastric perforation s/p ex lap, omental patch in 2019, benign esophageal stricture (s/p stent 2022), [place]ow procedure 7/2022, admission from November to February initially at [facility] then transferred to [facility] in December involving septic shock due to pseudomonal PNA, lung abscess, Morganella bacteremia and Klebsiella UTI w/ hospital course c/b YUNIEL on CKD requiring HD and MICU stay, esophageal stent removal, EGD 1/13 sig for esophagitis/gastric ulcer, ESRD, s/p permacath placement and AVF placement with stage 2 revision completed, chronic anemia requiring multiple transfusions, skin breakdown in right toes secondary to vascular insufficiency s/p angioplasty with vascular, right UE DVT on Eliquis. Pt presents c/o epigastric pain x 3 days ever since he lost his Percocet.   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   Plan:  Advance diet   cont care per primary team  no surgical intervention, pt should follow up with Pancreatic surgeon  discussed with Dr. Ryan   Pt seen and examined at bedside with Dr. Ryan. Pt states that since surgery a ywar ago with pancreatic surgeon he hasn't followup. Pt states his abdominal pain is 2/2 not having solid food, pt asking for reqular diet.     T(F): 97.4 (07-15-23 @ 10:53), Max: 98.7 (07-14-23 @ 15:40)  HR: 59 (07-15-23 @ 10:53) (58 - 65)  BP: 125/81 (07-15-23 @ 10:53) (114/66 - 146/78)  RR: 18 (07-15-23 @ 10:53) (16 - 20)  SpO2: 100% (07-15-23 @ 10:53) (98% - 100%)  Wt(kg): --  CAPILLARY BLOOD GLUCOSE      PHYSICAL EXAM:  CONSTITUTIONAL: NAD, slender  HEAD:  Atraumatic, Normocephalic  EYES: Conjunctiva and sclera clear  ENMT: No tonsillar erythema, exudates, or enlargement; Moist mucous membranes, No lesions  NECK: Supple, No JVD, Normal thyroid  NERVOUS SYSTEM:  Alert & Oriented X3  RESPIRATORY: Clear to auscultation bilaterally; No rales, rhonchi, wheezing  CARDIOVASCULAR: Regular rate and rhythm. S1S2  GASTROINTESTINAL: Nondistended, +BS, soft, mild mid-abdominal tenderness, no guarding, no rigidity   MUSCULOSKELETAL: 2+ Peripheral Pulses, No clubbing, cyanosis, or edema      LABS:                        11.7   4.49  )-----------( 125      ( 15 Jul 2023 05:47 )             35.5     07-15    137  |  103  |  25<H>  ----------------------------<  68<L>  3.7   |  26  |  3.51<H>    Ca    6.9<L>      15 Jul 2023 05:47    TPro  4.9<L>  /  Alb  1.9<L>  /  TBili  0.2  /  DBili  x   /  AST  83<H>  /  ALT  120<H>  /  AlkPhos  281<H>  07-15    Lipase: 19 U/L (07.15.23 @ 05:47)    I&O's Detail      62M PMHx ETOH abuse, chronic pancreatitis, HCV s/p treatment (SVR), emphysema, CHF, HTN, gastric perforation s/p ex lap, omental patch in 2019, benign esophageal stricture (s/p stent 2022), [place]ow procedure 7/2022, admission from November to February initially at [facility] then transferred to [facility] in December involving septic shock due to pseudomonal PNA, lung abscess, Morganella bacteremia and Klebsiella UTI w/ hospital course c/b YUNIEL on CKD requiring HD and MICU stay, esophageal stent removal, EGD 1/13 sig for esophagitis/gastric ulcer, ESRD, s/p permacath placement and AVF placement with stage 2 revision completed, chronic anemia requiring multiple transfusions, skin breakdown in right toes secondary to vascular insufficiency s/p angioplasty with vascular, right UE DVT on Eliquis. Pt presents c/o epigastric pain x 3 days ever since he lost his Percocet.   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 Abdominal pain markedly improved.   Plan:  Advance diet   cont care per primary team  no surgical intervention, pt should follow up with Pancreatic surgeon  discussed with Dr. Ryan

## 2023-07-15 NOTE — PROGRESS NOTE ADULT - SUBJECTIVE AND OBJECTIVE BOX
NEPHROLOGY PROGRESS NOTE    CHIEF COMPLAINT:  ESRD    HPI:  Had uneventful dialysis yesterday.  States he is hungry and wants to eat    EXAM:  Vital Signs Last 24 Hrs  T(C): 36.3 (15 Jul 2023 04:48), Max: 37.1 (2023 15:40)  T(F): 97.3 (15 Jul 2023 04:48), Max: 98.7 (2023 15:40)  HR: 61 (15 Jul 2023 04:48) (58 - 87)  BP: 115/69 (15 Jul 2023 04:48) (114/66 - 159/73)  BP(mean): --  RR: 18 (15 Jul 2023 04:48) (16 - 20)  SpO2: 98% (15 Jul 2023 04:48) (95% - 100%)    Parameters below as of 15 Jul 2023 04:48  Patient On (Oxygen Delivery Method): room air      I&O's Summary    Daily     Daily Weight in k.1 (2023 15:40)    Conversant, in no apparent distress  Normal respiratory effort, lungs clear bilaterally  Heart RRR with no murmur, no peripheral edema    LABS                        11.7   4.49  )-----------( 125      ( 15 Jul 2023 05:47 )             35.5     07-15    137  |  103  |  25<H>  ----------------------------<  68<L>  3.7   |  26  |  3.51<H>    Ca    6.9<L>      15 Jul 2023 05:47    TPro  4.9<L>  /  Alb  1.9<L>  /  TBili  0.2  /  DBili  x   /  AST  83<H>  /  ALT  120<H>  /  AlkPhos  281<H>  07-15    Urinalysis Basic - ( 15 Jul 2023 05:47 )  Color: x / Appearance: x / SG: x / pH: x  Gluc: 68 mg/dL / Ketone: x  / Bili: x / Urobili: x   Blood: x / Protein: x / Nitrite: x   Leuk Esterase: x / RBC: x / WBC x   Sq Epi: x / Non Sq Epi: x / Bacteria: x      ASSESSMENT:  1.  ESRD on hemodialysis MWF  2.  Acute/chronic pancreatitis    PLAN:  Resume hemodialysis on Monday

## 2023-07-15 NOTE — PROGRESS NOTE ADULT - SUBJECTIVE AND OBJECTIVE BOX
Patient is a 62y old  Male who presents with a chief complaint of emphysematous pancreatitis (15 Jul 2023 09:56)      INTERVAL HPI/OVERNIGHT EVENTS:  not in acute distress   denies cp palpitation and sob     MEDICATIONS  (STANDING):  apixaban 2.5 milliGRAM(s) Oral two times a day  ibuprofen  Tablet. 400 milliGRAM(s) Oral once  meropenem  IVPB 500 milliGRAM(s) IV Intermittent every 24 hours    MEDICATIONS  (PRN):  acetaminophen     Tablet .. 650 milliGRAM(s) Oral every 6 hours PRN Temp greater or equal to 38C (100.4F), Mild Pain (1 - 3)  aluminum hydroxide/magnesium hydroxide/simethicone Suspension 30 milliLiter(s) Oral every 4 hours PRN Dyspepsia  melatonin 3 milliGRAM(s) Oral at bedtime PRN Insomnia  morphine  - Injectable 2 milliGRAM(s) IV Push every 4 hours PRN Severe Pain (7 - 10)  ondansetron Injectable 4 milliGRAM(s) IV Push every 8 hours PRN Nausea and/or Vomiting      Allergies    Tylenol (Other)    Intolerances        REVIEW OF SYSTEMS:  CONSTITUTIONAL: No fever, weight loss, or fatigue  EYES: No eye pain, visual disturbances, or discharge  ENMT:  No difficulty hearing, tinnitus, vertigo; No sinus or throat pain  NECK: No pain or stiffness  BREASTS: No pain, masses, or nipple discharge  RESPIRATORY: No cough, wheezing, chills or hemoptysis; No shortness of breath  CARDIOVASCULAR: No chest pain, palpitations, dizziness, or leg swelling  GASTROINTESTINAL: No abdominal or epigastric pain. No nausea, vomiting, or hematemesis; No diarrhea or constipation. No melena or hematochezia.  GENITOURINARY: No dysuria, frequency, hematuria, or incontinence  NEUROLOGICAL: No headaches, memory loss, loss of strength, numbness, or tremors  SKIN: No itching, burning, rashes, or lesions   LYMPH NODES: No enlarged glands  ENDOCRINE: No heat or cold intolerance; No hair loss  MUSCULOSKELETAL: No joint pain or swelling; No muscle, back, or extremity pain  PSYCHIATRIC: No depression, anxiety, mood swings, or difficulty sleeping  HEME/LYMPH: No easy bruising, or bleeding gums  ALLERGY AND IMMUNOLOGIC: No hives or eczema    Vital Signs Last 24 Hrs  T(C): 36.3 (15 Jul 2023 10:53), Max: 37.1 (14 Jul 2023 15:40)  T(F): 97.4 (15 Jul 2023 10:53), Max: 98.7 (14 Jul 2023 15:40)  HR: 59 (15 Jul 2023 10:53) (58 - 87)  BP: 125/81 (15 Jul 2023 10:53) (114/66 - 146/78)  BP(mean): --  RR: 18 (15 Jul 2023 10:53) (16 - 20)  SpO2: 100% (15 Jul 2023 10:53) (95% - 100%)    Parameters below as of 15 Jul 2023 04:48  Patient On (Oxygen Delivery Method): room air        PHYSICAL EXAM:  GENERAL: NAD, well-groomed, well-developed  HEAD:  Atraumatic, Normocephalic  EYES: EOMI, PERRLA, conjunctiva and sclera clear  ENMT: No tonsillar erythema, exudates, or enlargement; Moist mucous membranes, Good dentition, No lesions  NECK: Supple, No JVD, Normal thyroid  NERVOUS SYSTEM:  Alert & Oriented X3, Good concentration; Motor Strength 5/5 B/L upper and lower extremities; DTRs 2+ intact and symmetric  CHEST/LUNG: Clear to percussion bilaterally; No rales, rhonchi, wheezing, or rubs  HEART: Regular rate and rhythm; No murmurs, rubs, or gallops  ABDOMEN: Soft, Nontender, Nondistended; Bowel sounds present  EXTREMITIES:  2+ Peripheral Pulses, No clubbing, cyanosis, or edema  LYMPH: No lymphadenopathy noted  SKIN: No rashes or lesions    LABS:                        11.7   4.49  )-----------( 125      ( 15 Jul 2023 05:47 )             35.5     07-15    137  |  103  |  25<H>  ----------------------------<  68<L>  3.7   |  26  |  3.51<H>    Ca    6.9<L>      15 Jul 2023 05:47    TPro  4.9<L>  /  Alb  1.9<L>  /  TBili  0.2  /  DBili  x   /  AST  83<H>  /  ALT  120<H>  /  AlkPhos  281<H>  07-15      Urinalysis Basic - ( 15 Jul 2023 05:47 )    Color: x / Appearance: x / SG: x / pH: x  Gluc: 68 mg/dL / Ketone: x  / Bili: x / Urobili: x   Blood: x / Protein: x / Nitrite: x   Leuk Esterase: x / RBC: x / WBC x   Sq Epi: x / Non Sq Epi: x / Bacteria: x      CAPILLARY BLOOD GLUCOSE          RADIOLOGY & ADDITIONAL TESTS:    Imaging Personally Reviewed:  [ X] YES  [ ] NO    Consultant(s) Notes Reviewed:  [ X] YES  [ ] NO    Care Discussed with Consultants/Other Providers [X ] YES  [ ] NO

## 2023-07-16 NOTE — DIETITIAN INITIAL EVALUATION ADULT - PERTINENT MEDS FT
MEDICATIONS  (STANDING):  apixaban 2.5 milliGRAM(s) Oral two times a day  dextrose 5% + sodium chloride 0.45%. 1000 milliLiter(s) (60 mL/Hr) IV Continuous <Continuous>  ibuprofen  Tablet. 400 milliGRAM(s) Oral once  meropenem  IVPB 500 milliGRAM(s) IV Intermittent every 24 hours    MEDICATIONS  (PRN):  acetaminophen     Tablet .. 650 milliGRAM(s) Oral every 6 hours PRN Temp greater or equal to 38C (100.4F), Mild Pain (1 - 3)  aluminum hydroxide/magnesium hydroxide/simethicone Suspension 30 milliLiter(s) Oral every 4 hours PRN Dyspepsia  melatonin 3 milliGRAM(s) Oral at bedtime PRN Insomnia  ondansetron Injectable 4 milliGRAM(s) IV Push every 8 hours PRN Nausea and/or Vomiting  oxyCODONE    IR 10 milliGRAM(s) Oral every 4 hours PRN Severe Pain (7 - 10)

## 2023-07-16 NOTE — DIETITIAN INITIAL EVALUATION ADULT - ADD RECOMMEND
1. Reinforce food safety education as able  2. Behavioral health consult   3. Monitor and replete electrolytes as needed

## 2023-07-16 NOTE — DIETITIAN INITIAL EVALUATION ADULT - ETIOLOGY
Inadequate protein-energy intake and increased protein-energy needs in setting of stage III pressure injury, ESRD on HD, chron pancreatitis, CHF, socialcircumstanc

## 2023-07-16 NOTE — DIETITIAN INITIAL EVALUATION ADULT - NS FNS DIET ORDER
Diet, Renal Restrictions:   For patients receiving Renal Replacement - No Protein Restr, No Conc K, No Conc Phos, Low Sodium (07-16-23 @ 08:49)

## 2023-07-16 NOTE — PROGRESS NOTE ADULT - ASSESSMENT
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 Recommend non-narcotic strategies (gabapentin, tylenol) combined with short term narcotic use during the current exacerbation.   -     Pain management should proceed in a stepwise approach.   initial treatment begins with recommendations to stop alcohol and tobacco and to eat small meals that are low in fat. Discuss and advise    use of pancreatic enzyme supplements in patients with pain persisting after the above interventions (Grade 2C). These relieve pain in some patients and are generally safe.     Treatment with acid suppression (either with an H2 receptor blocker or a proton pump inhibitor) should be given along with pancreatic enzyme supplements to reduce inactivation from gastric acid.    -Analgesics with opiates and/or nonsteroidal antiinflammatory agents can be considered if pancreatic enzyme therapy fails to control pain. Adjuvant therapy with pregabalin can be considered in patients whose pain is not adequately controlled with opiates and/or nonsteroidal antiinflammatory agents.   Recommend non-narcotic strategies (gabapentin, tylenol) combined with short term narcotic use during the current exacerbation. discuss and advise and judicious use of narcotic   Steatorrhea (fat malabsorption) may develop in patients with severe pancreatic exocrine dysfunction. Treatment depends upon the severity of disease.    -Dietary modification should begin with restriction of fat intake (to less than 20 g per day) discuss and advise   Medium chain triglycerides (MCTs) can provide extra calories in patients with weight loss and a poor response to diet and pancreatic enzyme therapy.   Glucose intolerance occurs with some frequency in chronic pancreatitis, but overt diabetes mellitus usually occurs late in the course of disease. A trial of oral hypoglycemic agents followed by insulin therapy when the need arises has been the line of management in these patients.  ?Management of other complications (such as pseudocysts, bile duct or duodenal obstruction, pancreatic ascites, splenic vein thrombosis, and pseudoaneurysms to be done if needed      patient  clinicaly feels better    gi consult   seen by surgery and no intervention at this time advise       ct finding as above with h/o Sequela of chronic pancreatitis. New foci of air throughout the pancreatic parenchyma  recent procedure   no increase wbc and    no fever  clinically feels better and wanted to eat   long discussion and he is adamant in eating   empirical antibiotic  surgery consulted  not surgical candidate   gi consulted

## 2023-07-16 NOTE — DIETITIAN INITIAL EVALUATION ADULT - OTHER INFO
At time of visit pt was hoarding food from breakfast and lunch meals. Pt requesting double portions of all meals, sides, and snacks. Discussed with pt that this is a food safety issue. Pt amenable to double protein portions, extra side dish, and dessert with each tray. Pt also amenable to Nepro x 3/day (provides 1275 kcal, 57 g protein). Preferences taken and relayed to diet office. Denies difficulty chewing/swallowing- RN confirms pt tolerating meals and medications.   Pt and RN made aware RD remains available.

## 2023-07-16 NOTE — DIETITIAN INITIAL EVALUATION ADULT - PERTINENT LABORATORY DATA
07-16    142  |  109<H>  |  36<H>  ----------------------------<  129<H>  3.1<L>   |  24  |  5.00<H>    Ca    6.6<L>      16 Jul 2023 06:10    TPro  4.9<L>  /  Alb  2.0<L>  /  TBili  0.2  /  DBili  x   /  AST  40<H>  /  ALT  100<H>  /  AlkPhos  271<H>  07-16  A1C with Estimated Average Glucose Result: 5.7 % (09-01-22 @ 08:58)

## 2023-07-16 NOTE — PROGRESS NOTE ADULT - SUBJECTIVE AND OBJECTIVE BOX
Patient is a 62y old  Male who presents with a chief complaint of emphysematous pancreatitis (15 Jul 2023 11:18)      INTERVAL HPI/OVERNIGHT EVENTS:  wants to eat  denies cp palpitation sob and no abdominal pain  no nausea and no vomiting     MEDICATIONS  (STANDING):  apixaban 2.5 milliGRAM(s) Oral two times a day  dextrose 5% + sodium chloride 0.45%. 1000 milliLiter(s) (60 mL/Hr) IV Continuous <Continuous>  ibuprofen  Tablet. 400 milliGRAM(s) Oral once  meropenem  IVPB 500 milliGRAM(s) IV Intermittent every 24 hours    MEDICATIONS  (PRN):  acetaminophen     Tablet .. 650 milliGRAM(s) Oral every 6 hours PRN Temp greater or equal to 38C (100.4F), Mild Pain (1 - 3)  aluminum hydroxide/magnesium hydroxide/simethicone Suspension 30 milliLiter(s) Oral every 4 hours PRN Dyspepsia  melatonin 3 milliGRAM(s) Oral at bedtime PRN Insomnia  morphine  - Injectable 2 milliGRAM(s) IV Push every 4 hours PRN Severe Pain (7 - 10)  ondansetron Injectable 4 milliGRAM(s) IV Push every 8 hours PRN Nausea and/or Vomiting      Allergies    Tylenol (Other)    Intolerances        REVIEW OF SYSTEMS:  CONSTITUTIONAL: No fever, weight loss, or fatigue  EYES: No eye pain, visual disturbances, or discharge  ENMT:  No difficulty hearing, tinnitus, vertigo; No sinus or throat pain  NECK: No pain or stiffness  BREASTS: No pain, masses, or nipple discharge  RESPIRATORY: No cough, wheezing, chills or hemoptysis; No shortness of breath  CARDIOVASCULAR: No chest pain, palpitations, dizziness, or leg swelling  GASTROINTESTINAL: No abdominal or epigastric pain. No nausea, vomiting, or hematemesis; No diarrhea or constipation. No melena or hematochezia.  GENITOURINARY: No dysuria, frequency, hematuria, or incontinence  NEUROLOGICAL: No headaches, memory loss, loss of strength, numbness, or tremors  SKIN: No itching, burning, rashes, or lesions   LYMPH NODES: No enlarged glands  ENDOCRINE: No heat or cold intolerance; No hair loss  MUSCULOSKELETAL: No joint pain or swelling; No muscle, back, or extremity pain  PSYCHIATRIC: No depression, anxiety, mood swings, or difficulty sleeping  HEME/LYMPH: No easy bruising, or bleeding gums  ALLERGY AND IMMUNOLOGIC: No hives or eczema    Vital Signs Last 24 Hrs  T(C): 36.5 (16 Jul 2023 04:48), Max: 36.6 (15 Jul 2023 17:02)  T(F): 97.7 (16 Jul 2023 04:48), Max: 97.8 (15 Jul 2023 17:02)  HR: 69 (16 Jul 2023 04:48) (59 - 78)  BP: 122/62 (16 Jul 2023 04:48) (122/62 - 155/82)  BP(mean): --  RR: 17 (16 Jul 2023 04:48) (17 - 18)  SpO2: 95% (16 Jul 2023 04:48) (95% - 100%)    Parameters below as of 16 Jul 2023 04:48  Patient On (Oxygen Delivery Method): room air        PHYSICAL EXAM:  GENERAL: NAD, well-groomed, well-developed  HEAD:  Atraumatic, Normocephalic  EYES: EOMI, PERRLA, conjunctiva and sclera clear  ENMT: No tonsillar erythema, exudates, or enlargement; Moist mucous membranes, Good dentition, No lesions  NECK: Supple, No JVD, Normal thyroid  NERVOUS SYSTEM:  Alert & Oriented X3, Good concentration; Motor Strength 5/5 B/L upper and lower extremities; DTRs 2+ intact and symmetric  CHEST/LUNG: Clear to percussion bilaterally; No rales, rhonchi, wheezing, or rubs  HEART: Regular rate and rhythm; No murmurs, rubs, or gallops  ABDOMEN: Soft, Nontender, Nondistended; Bowel sounds present  EXTREMITIES:  2+ Peripheral Pulses, No clubbing, cyanosis, or edema  LYMPH: No lymphadenopathy noted  SKIN: No rashes or lesions    LABS:                        11.7   4.49  )-----------( 125      ( 15 Jul 2023 05:47 )             35.5     07-16    142  |  109<H>  |  36<H>  ----------------------------<  129<H>  3.1<L>   |  24  |  5.00<H>    Ca    6.6<L>      16 Jul 2023 06:10    TPro  4.9<L>  /  Alb  2.0<L>  /  TBili  0.2  /  DBili  x   /  AST  40<H>  /  ALT  100<H>  /  AlkPhos  271<H>  07-16      Urinalysis Basic - ( 16 Jul 2023 06:10 )    Color: x / Appearance: x / SG: x / pH: x  Gluc: 129 mg/dL / Ketone: x  / Bili: x / Urobili: x   Blood: x / Protein: x / Nitrite: x   Leuk Esterase: x / RBC: x / WBC x   Sq Epi: x / Non Sq Epi: x / Bacteria: x      CAPILLARY BLOOD GLUCOSE          RADIOLOGY & ADDITIONAL TESTS:    Imaging Personally Reviewed:  [ X] YES  [ ] NO    Consultant(s) Notes Reviewed:  [ X] YES  [ ] NO    Care Discussed with Consultants/Other Providers [X ] YES  [ ] NO

## 2023-07-16 NOTE — DIETITIAN NUTRITION RISK NOTIFICATION - TREATMENT: THE FOLLOWING DIET HAS BEEN RECOMMENDED
Diet, Renal Restrictions:   For patients receiving Renal Replacement - No Protein Restr, No Conc K, No Conc Phos, Low Sodium  Supplement Feeding Modality:  Oral  Nepro Cans or Servings Per Day:  1       Frequency:  Three Times a day (07-16-23 @ 15:27) [Pending Verification By Attending]  Diet, Renal Restrictions:   For patients receiving Renal Replacement - No Protein Restr, No Conc K, No Conc Phos, Low Sodium (07-16-23 @ 08:49) [Active]

## 2023-07-17 NOTE — PROGRESS NOTE ADULT - REASON FOR ADMISSION
emphysematous pancreatitis

## 2023-07-17 NOTE — PROGRESS NOTE ADULT - PROBLEM SELECTOR PLAN 3
Chronic stable  HD on MWF  Nephrology consult in AM

## 2023-07-17 NOTE — CHART NOTE - NSCHARTNOTEFT_GEN_A_CORE
Provider called by primary nurse. Patient demanding to leave hospital against medial advice. Transport coordinated by social work during day time. On chart review patient attempted to leave against medical advice. Patient agreed to stay inpatient. There is also a plan to have GI consult. Transport canceled. Provider presented to bedside to discuss further with patient. Attempt to convince patient to wait until the AM to allow safe discharge. Patient adamantly refusing. Attempt to contact attending of record Dr. Cerna unsuccessful. Case discussed with hospitalist Provider called by primary nurse. Patient demanding to leave hospital against medial advice. Transport coordinated by social work during day time. On chart review patient attempted to leave against medical advice. Patient agreed to stay inpatient. There is also a plan to have GI consult. Transport canceled. Provider presented to bedside to discuss further with patient. Attempts to convince patient to wait until the AM to allow safe discharge is unsuccessful. Patient adamantly refusing. Attempt to contact attending of record Dr. Cerna unsuccessful. Case discussed with hospitalist in charge Dorina. Patient has capacity.

## 2023-07-17 NOTE — PROGRESS NOTE ADULT - PROVIDER SPECIALTY LIST ADULT
Internal Medicine
Nephrology
Nephrology
Surgery
Internal Medicine

## 2023-07-17 NOTE — PROGRESS NOTE ADULT - PROBLEM SELECTOR PLAN 2
Chronic stable  /80  Medrec in AM

## 2023-07-17 NOTE — PROGRESS NOTE ADULT - PROBLEM SELECTOR PROBLEM 4
History of DVT in adulthood

## 2023-07-17 NOTE — PROGRESS NOTE ADULT - SUBJECTIVE AND OBJECTIVE BOX
Patient is a 62y old  Male who presents with a chief complaint of emphysematous pancreatitis (17 Jul 2023 15:21)      INTERVAL HPI/OVERNIGHT EVENTS:    MEDICATIONS  (STANDING):  apixaban 2.5 milliGRAM(s) Oral two times a day  dextrose 5% + sodium chloride 0.45%. 1000 milliLiter(s) (60 mL/Hr) IV Continuous <Continuous>  ibuprofen  Tablet. 400 milliGRAM(s) Oral once  meropenem  IVPB 500 milliGRAM(s) IV Intermittent every 24 hours    MEDICATIONS  (PRN):  acetaminophen     Tablet .. 650 milliGRAM(s) Oral every 6 hours PRN Temp greater or equal to 38C (100.4F), Mild Pain (1 - 3)  aluminum hydroxide/magnesium hydroxide/simethicone Suspension 30 milliLiter(s) Oral every 4 hours PRN Dyspepsia  melatonin 3 milliGRAM(s) Oral at bedtime PRN Insomnia  ondansetron Injectable 4 milliGRAM(s) IV Push every 8 hours PRN Nausea and/or Vomiting  oxyCODONE    IR 10 milliGRAM(s) Oral every 4 hours PRN Severe Pain (7 - 10)      Allergies    Tylenol (Other)    Intolerances        REVIEW OF SYSTEMS:  CONSTITUTIONAL: No fever, weight loss, or fatigue  EYES: No eye pain, visual disturbances, or discharge  ENMT:  No difficulty hearing, tinnitus, vertigo; No sinus or throat pain  NECK: No pain or stiffness  BREASTS: No pain, masses, or nipple discharge  RESPIRATORY: No cough, wheezing, chills or hemoptysis; No shortness of breath  CARDIOVASCULAR: No chest pain, palpitations, dizziness, or leg swelling  GASTROINTESTINAL: No abdominal or epigastric pain. No nausea, vomiting, or hematemesis; No diarrhea or constipation. No melena or hematochezia.  GENITOURINARY: No dysuria, frequency, hematuria, or incontinence  NEUROLOGICAL: No headaches, memory loss, loss of strength, numbness, or tremors  SKIN: No itching, burning, rashes, or lesions   LYMPH NODES: No enlarged glands  ENDOCRINE: No heat or cold intolerance; No hair loss  MUSCULOSKELETAL: No joint pain or swelling; No muscle, back, or extremity pain  PSYCHIATRIC: No depression, anxiety, mood swings, or difficulty sleeping  HEME/LYMPH: No easy bruising, or bleeding gums  ALLERGY AND IMMUNOLOGIC: No hives or eczema    Vital Signs Last 24 Hrs  T(C): 36.4 (17 Jul 2023 05:06), Max: 36.7 (16 Jul 2023 17:05)  T(F): 97.5 (17 Jul 2023 05:06), Max: 98 (16 Jul 2023 17:05)  HR: 68 (17 Jul 2023 05:06) (68 - 75)  BP: 126/76 (17 Jul 2023 05:06) (117/74 - 136/78)  BP(mean): --  RR: 18 (17 Jul 2023 05:06) (17 - 18)  SpO2: 96% (17 Jul 2023 05:06) (95% - 97%)    Parameters below as of 17 Jul 2023 05:06  Patient On (Oxygen Delivery Method): room air        PHYSICAL EXAM:  GENERAL: NAD, well-groomed, well-developed  HEAD:  Atraumatic, Normocephalic  EYES: EOMI, PERRLA, conjunctiva and sclera clear  ENMT: No tonsillar erythema, exudates, or enlargement; Moist mucous membranes, Good dentition, No lesions  NECK: Supple, No JVD, Normal thyroid  NERVOUS SYSTEM:  Alert & Oriented X3, Good concentration; Motor Strength 5/5 B/L upper and lower extremities; DTRs 2+ intact and symmetric  CHEST/LUNG: Clear to percussion bilaterally; No rales, rhonchi, wheezing, or rubs  HEART: Regular rate and rhythm; No murmurs, rubs, or gallops  ABDOMEN: Soft, Nontender, Nondistended; Bowel sounds present  EXTREMITIES:  2+ Peripheral Pulses, No clubbing, cyanosis, or edema  LYMPH: No lymphadenopathy noted  SKIN: No rashes or lesions    LABS:    07-16    142  |  109<H>  |  36<H>  ----------------------------<  129<H>  3.1<L>   |  24  |  5.00<H>    Ca    6.6<L>      16 Jul 2023 06:10    TPro  4.9<L>  /  Alb  2.0<L>  /  TBili  0.2  /  DBili  x   /  AST  40<H>  /  ALT  100<H>  /  AlkPhos  271<H>  07-16      Urinalysis Basic - ( 16 Jul 2023 06:10 )    Color: x / Appearance: x / SG: x / pH: x  Gluc: 129 mg/dL / Ketone: x  / Bili: x / Urobili: x   Blood: x / Protein: x / Nitrite: x   Leuk Esterase: x / RBC: x / WBC x   Sq Epi: x / Non Sq Epi: x / Bacteria: x      CAPILLARY BLOOD GLUCOSE          RADIOLOGY & ADDITIONAL TESTS:    Imaging Personally Reviewed:  [ X] YES  [ ] NO    Consultant(s) Notes Reviewed:  [ X] YES  [ ] NO    Care Discussed with Consultants/Other Providers [X ] YES  [ ] NO Patient is a 62y old  Male who presents with a chief complaint of emphysematous pancreatitis (17 Jul 2023 15:21)      INTERVAL HPI/OVERNIGHT EVENTS:  pain appears to be adequately control and now back on his home oral oxy  wanted to eat and no N /V no abdomen pain and diet was advnace to renal  denies cp palpitation and sob     MEDICATIONS  (STANDING):  apixaban 2.5 milliGRAM(s) Oral two times a day  dextrose 5% + sodium chloride 0.45%. 1000 milliLiter(s) (60 mL/Hr) IV Continuous <Continuous>  ibuprofen  Tablet. 400 milliGRAM(s) Oral once  meropenem  IVPB 500 milliGRAM(s) IV Intermittent every 24 hours    MEDICATIONS  (PRN):  acetaminophen     Tablet .. 650 milliGRAM(s) Oral every 6 hours PRN Temp greater or equal to 38C (100.4F), Mild Pain (1 - 3)  aluminum hydroxide/magnesium hydroxide/simethicone Suspension 30 milliLiter(s) Oral every 4 hours PRN Dyspepsia  melatonin 3 milliGRAM(s) Oral at bedtime PRN Insomnia  ondansetron Injectable 4 milliGRAM(s) IV Push every 8 hours PRN Nausea and/or Vomiting  oxyCODONE    IR 10 milliGRAM(s) Oral every 4 hours PRN Severe Pain (7 - 10)      Allergies    Tylenol (Other)    Intolerances        REVIEW OF SYSTEMS: off and on abdominal pain off an don and rest ROS was negative including no nausea  and no vomiting np cp no palpitation no sob     Vital Signs Last 24 Hrs  T(C): 36.4 (17 Jul 2023 05:06), Max: 36.7 (16 Jul 2023 17:05)  T(F): 97.5 (17 Jul 2023 05:06), Max: 98 (16 Jul 2023 17:05)  HR: 68 (17 Jul 2023 05:06) (68 - 75)  BP: 126/76 (17 Jul 2023 05:06) (117/74 - 136/78)  BP(mean): --  RR: 18 (17 Jul 2023 05:06) (17 - 18)  SpO2: 96% (17 Jul 2023 05:06) (95% - 97%)    Parameters below as of 17 Jul 2023 05:06  Patient On (Oxygen Delivery Method): room air        PHYSICAL EXAM:  GENERAL: NAD, well-groomed, well-developed  HEAD:  Atraumatic, Normocephalic  EYES: EOMI, PERRLA, conjunctiva and sclera clear  ENMT: No tonsillar erythema, exudates, or enlargement; Moist mucous membranes, Good dentition, No lesions  NECK: Supple, No JVD, Normal thyroid  NERVOUS SYSTEM:  Alert & Oriented X3, Good concentration; Motor Strength 5/5 B/L upper and lower extremities; DTRs 2+ intact and symmetric  CHEST/LUNG: Clear to percussion bilaterally; No rales, rhonchi, wheezing, or rubs  HEART: Regular rate and rhythm; No murmurs, rubs, or gallops  ABDOMEN: Soft, mild tenderness deep palpation right upper quadrant on deep palpation r, Nondistended; Bowel sounds present  EXTREMITIES:  2+ Peripheral Pulses, No clubbing, cyanosis, or edema  LYMPH: No lymphadenopathy noted  SKIN: No rashes or lesions    LABS:    LABS:  cret    07-16    142  |  109<H>  |  36<H>  ----------------------------<  129<H>  3.1<L>   |  24  |  5.00<H>    Ca    6.6<L>      16 Jul 2023 06:10    TPro  4.9<L>  /  Alb  2.0<L>  /  TBili  0.2  /  DBili  x   /  AST  40<H>  /  ALT  100<H>  /  AlkPhos  271<H>  07-16 07-16    142  |  109<H>  |  36<H>  ----------------------------<  129<H>  3.1<L>   |  24  |  5.00<H>    Ca    6.6<L>      16 Jul 2023 06:10    TPro  4.9<L>  /  Alb  2.0<L>  /  TBili  0.2  /  DBili  x   /  AST  40<H>  /  ALT  100<H>  /  AlkPhos  271<H>  07-16      Urinalysis Basic - ( 16 Jul 2023 06:10 )    Color: x / Appearance: x / SG: x / pH: x  Gluc: 129 mg/dL / Ketone: x  / Bili: x / Urobili: x   Blood: x / Protein: x / Nitrite: x   Leuk Esterase: x / RBC: x / WBC x   Sq Epi: x / Non Sq Epi: x / Bacteria: x      CAPILLARY BLOOD GLUCOSE          RADIOLOGY & ADDITIONAL TESTS:    Imaging Personally Reviewed:  [ X] YES  [ ] NO    Consultant(s) Notes Reviewed:  [ X] YES  [ ] NO    Care Discussed with Consultants/Other Providers [X ] YES  [ ] NO

## 2023-07-17 NOTE — PROGRESS NOTE ADULT - NUTRITIONAL ASSESSMENT
This patient has been assessed with a concern for Malnutrition and has been determined to have a diagnosis/diagnoses of Severe protein-calorie malnutrition and Underweight (BMI < 19).    This patient is being managed with:   Diet Renal Restrictions-  For patients receiving Renal Replacement - No Protein Restr No Conc K No Conc Phos Low Sodium  Supplement Feeding Modality:  Oral  Nepro Cans or Servings Per Day:  1       Frequency:  Three Times a day  Entered: Jul 16 2023  3:27PM

## 2023-07-17 NOTE — PROGRESS NOTE ADULT - PROBLEM SELECTOR PLAN 5
Pharmacy emailed for medrec given pt states he does not know his medications

## 2023-07-17 NOTE — PROGRESS NOTE ADULT - PROBLEM SELECTOR PLAN 1
New  CT abdo/pelvis: Sequela of chronic pancreatitis. New foci of air throughout the pancreatic parenchyma,  Surgery consulted: NPO, Kat, Transfer to [facility] was declined by medical director   Morphine 2mg IV Q4 PRN for severe pain
New  CT abdo/pelvis: Sequela of chronic pancreatitis. New foci of air throughout the pancreatic parenchyma,  Surgery consulted: NPO, Kat, Transfer to [facility] was declined by medical director   Morphine 2mg IV Q4 PRN for severe pain
New  CT abdo/pelvis: Sequela of chronic pancreatitis. New foci of air throughout the pancreatic parenchyma,  Surgery consulted: NPO, Kat, Transfer to [facility] was declined by medical director   Morphine 2mg IV Q4 PRN for severe pain
New  CT abdo/pelvis: Sequela of chronic pancreatitis. New foci of air throughout the pancreatic parenchyma,  Surgery consulted: NPO, Kat, Transfer to [facility] was declined by medical director   Morphine 2mg IV Q4 PRN for severe pain

## 2023-07-17 NOTE — PROGRESS NOTE ADULT - ASSESSMENT
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 Recommend non-narcotic strategies (gabapentin, tylenol) combined with short term narcotic use during the current exacerbation.   -     Pain management should proceed in a stepwise approach.   initial treatment begins with recommendations to stop alcohol and tobacco and to eat small meals that are low in fat. Discuss and advise    use of pancreatic enzyme supplements in patients with pain persisting after the above interventions (Grade 2C). These relieve pain in some patients and are generally safe.     Treatment with acid suppression (either with an H2 receptor blocker or a proton pump inhibitor) should be given along with pancreatic enzyme supplements to reduce inactivation from gastric acid.    -Analgesics with opiates and/or nonsteroidal antiinflammatory agents can be considered if pancreatic enzyme therapy fails to control pain. Adjuvant therapy with pregabalin can be considered in patients whose pain is not adequately controlled with opiates and/or nonsteroidal antiinflammatory agents.   Recommend non-narcotic strategies (gabapentin, tylenol) combined with short term narcotic use during the current exacerbation. discuss and advise and judicious use of narcotic   Steatorrhea (fat malabsorption) may develop in patients with severe pancreatic exocrine dysfunction. Treatment depends upon the severity of disease.    -Dietary modification should begin with restriction of fat intake (to less than 20 g per day) discuss and advise   Medium chain triglycerides (MCTs) can provide extra calories in patients with weight loss and a poor response to diet and pancreatic enzyme therapy.   Glucose intolerance occurs with some frequency in chronic pancreatitis, but overt diabetes mellitus usually occurs late in the course of disease. A trial of oral hypoglycemic agents followed by insulin therapy when the need arises has been the line of management in these patients.  ?Management of other complications (such as pseudocysts, bile duct or duodenal obstruction, pancreatic ascites, splenic vein thrombosis, and pseudoaneurysms to be done if needed      patient  clinicaly feels better    gi consult   seen by surgery and no intervention at this time advise       ct finding as above with h/o Sequela of chronic pancreatitis. 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 Recommend non-narcotic strategies (gabapentin, tylenol) combined with short term narcotic use during the current exacerbation.   -     Pain management should proceed in a stepwise approach.   initial treatment begins with recommendations to stop alcohol and tobacco and to eat small meals that are low in fat. Discuss and advise    use of pancreatic enzyme supplements in patients with pain persisting after the above interventions (Grade 2C). These relieve pain in some patients and are generally safe.     Treatment with acid suppression (either with an H2 receptor blocker or a proton pump inhibitor) should be given along with pancreatic enzyme supplements to reduce inactivation from gastric acid.    -Analgesics with opiates and/or nonsteroidal antiinflammatory agents can be considered if pancreatic enzyme therapy fails to control pain. Adjuvant therapy with pregabalin can be considered in patients whose pain is not adequately controlled with opiates and/or nonsteroidal antiinflammatory agents.   Recommend non-narcotic strategies (gabapentin, tylenol) combined with short term narcotic use during the current exacerbation. discuss and advise and judicious use of narcotic   Steatorrhea (fat malabsorption) may develop in patients with severe pancreatic exocrine dysfunction. Treatment depends upon the severity of disease.  dw patient and wanted to go on his home regimen  oxycodone 10 every 4 hrs       -Dietary modification should begin with restriction of fat intake (to less than 20 g per day) discuss and advise but patient insisted on regular diet   counseliing done   Medium chain triglycerides (MCTs) can provide extra calories in patients with weight loss and a poor response to diet and pancreatic enzyme therapy.   patient  clinicaly feels better    gi consult   seen by surgery and no intervention at this time advise       ct finding as above with h/o Sequela of chronic pancreatitis. New foci of air throughout the pancreatic parenchyma  recent procedure   no increase wbc and    no fever  clinically feels better and wanted to eat   long discussion and he is adamant in eating   empirical antibiotic  surgery consulted  not surgical candidate   gi consulted   low calcium and corrected with low albumin is oky   low k on 7/16/23was replace a  patient is refusing  for any labs   dw patient labs ordered for am         patient at one time was planning to sign AMA because he wants his food restriction to be changed discuss with patient and he changed his plan for now and wanted to stay     disposition pending gi recommendation and clinical course .

## 2023-07-17 NOTE — PROGRESS NOTE ADULT - PROBLEM SELECTOR PLAN 4
Chronic stable  Continue eliquis

## 2023-07-17 NOTE — PROGRESS NOTE ADULT - SUBJECTIVE AND OBJECTIVE BOX
NEPHROLOGY PROGRESS NOTE    CHIEF COMPLAINT:  ESRD    HPI:  No complaints.    EXAM:  Vital Signs Last 24 Hrs  T(C): 36.4 (17 Jul 2023 05:06), Max: 36.7 (16 Jul 2023 17:05)  T(F): 97.5 (17 Jul 2023 05:06), Max: 98 (16 Jul 2023 17:05)  HR: 68 (17 Jul 2023 05:06) (68 - 75)  BP: 126/76 (17 Jul 2023 05:06) (117/74 - 136/78)  BP(mean): --  RR: 18 (17 Jul 2023 05:06) (17 - 18)  SpO2: 96% (17 Jul 2023 05:06) (95% - 97%)    Parameters below as of 17 Jul 2023 05:06  Patient On (Oxygen Delivery Method): room air      I&O's Summary    16 Jul 2023 07:01  -  17 Jul 2023 07:00  --------------------------------------------------------  IN: 240 mL / OUT: 0 mL / NET: 240 mL    Conversant, in no apparent distress  Normal respiratory effort, lungs clear bilaterally  Heart RRR with no murmur, no peripheral edema    LABS    07-16    142  |  109<H>  |  36<H>  ----------------------------<  129<H>  3.1<L>   |  24  |  5.00<H>    Ca    6.6<L>      16 Jul 2023 06:10    TPro  4.9<L>  /  Alb  2.0<L>  /  TBili  0.2  /  DBili  x   /  AST  40<H>  /  ALT  100<H>  /  AlkPhos  271<H>  07-16    Urinalysis Basic - ( 16 Jul 2023 06:10 )    Color: x / Appearance: x / SG: x / pH: x  Gluc: 129 mg/dL / Ketone: x  / Bili: x / Urobili: x   Blood: x / Protein: x / Nitrite: x   Leuk Esterase: x / RBC: x / WBC x   Sq Epi: x / Non Sq Epi: x / Bacteria: x      ASSESSMENT:  1.  ESRD on hemodialysis MWF  2.  Acute/chronic pancreatitis    PLAN:  Hemodialysis ordered later today

## 2023-07-18 NOTE — DISCHARGE NOTE PROVIDER - PROVIDER TOKENS
PROVIDER:[TOKEN:[[id number]],FOLLOWUP:[1 week]],FREE:[LAST:[PCP],PHONE:[(   )    -],FAX:[(   )    -],ADDRESS:[Follow up with PCP for hospital discharge follow up]]

## 2023-07-18 NOTE — DISCHARGE NOTE PROVIDER - NSDCMRMEDTOKEN_GEN_ALL_CORE_FT
aluminum hydroxide-magnesium hydroxide 200 mg-200 mg/5 mL oral suspension: 30 milliliter(s) orally every 4 hours As needed Dyspepsia  apixaban 5 mg oral tablet: 1 tab(s) orally 2 times a day  bisacodyl 5 mg oral delayed release tablet: 1 tab(s) orally every 12 hours, As needed, Constipation  cadexomer iodine 0.9% topical gel: 1 application topically once a day  calcitriol 0.5 mcg oral capsule: 1 cap(s) orally once a day  folic acid 1 mg oral tablet: 1 tab(s) orally once a day  lidocaine 4% topical film: Apply topically to affected area once a day  metoclopramide 5 mg oral tablet: 1 tab(s) orally 3 times a day  Multiple Vitamins oral tablet: 1 tab(s) orally once a day  NIFEdipine 30 mg oral tablet, extended release: 1 tab(s) orally once a day  oxyCODONE 10 mg oral tablet: 1 tab(s) orally every 4 hours, As needed, Severe Pain (7 - 10)  pancrelipase 6000 units-19,000 units-30,000 units oral delayed release capsule: 1 cap(s) orally 3 times a day (with meals)  pantoprazole 40 mg oral delayed release tablet: 1 tab(s) orally every 12 hours  polyethylene glycol 3350 oral powder for reconstitution: 17 gram(s) orally 2 times a day  senna leaf extract oral tablet: 2 tab(s) orally once a day (at bedtime)  sucralfate 1 g oral tablet: 1 tab(s) orally every 6 hours  thiamine 100 mg oral tablet: 1 tab(s) orally once a day  tiZANidine 2 mg oral tablet: 1 tab(s) orally every 8 hours, As needed, muscle cramps

## 2023-07-18 NOTE — DISCHARGE NOTE PROVIDER - CARE PROVIDER_API CALL
Nisha Quintanilla  Surgery  [address] [place] [address]  Phone: [phone]  Fax: [phone]  Follow Up Time: 1 week    PCP,   Follow up with PCP for hospital discharge follow up  Phone: (   )    -  Fax: (   )    -  Follow Up Time:

## 2023-07-18 NOTE — DISCHARGE NOTE NURSING/CASE MANAGEMENT/SOCIAL WORK - NSDCVIVACCINE_GEN_ALL_CORE_FT
Tdap; 30-Aug-2022 22:32; Rusty Stratton (RN); [employer]; [id number] (Exp. Date: 29-Nov-2024); IntraMuscular; Deltoid Left.; 0.5 milliLiter(s); VIS (VIS Published: 09-May-2013, VIS Presented: 30-Aug-2022);

## 2023-07-18 NOTE — DISCHARGE NOTE PROVIDER - ATTENDING DISCHARGE PHYSICAL EXAMINATION:
GENERAL: Alert and oriented x 4  CARDIO: regular rate and rhythm   PULMNOLOGY: CTABL  GI: soft, non-tender, no rebound, no guardin

## 2023-07-18 NOTE — DISCHARGE NOTE PROVIDER - DETAILS OF MALNUTRITION DIAGNOSIS/DIAGNOSES
This patient has been assessed with a concern for Malnutrition and was treated during this hospitalization for the following Nutrition diagnosis/diagnoses:     -  07/16/2023: Severe protein-calorie malnutrition   -  07/16/2023: Underweight (BMI < 19)

## 2023-07-18 NOTE — DISCHARGE NOTE NURSING/CASE MANAGEMENT/SOCIAL WORK - PATIENT PORTAL LINK FT
You can access the FollowMyHealth Patient Portal offered by [facility] by registering at the following website: [email or web address]. By joining [email or web address]’s FollowMyHealth portal, you will also be able to view your health information using other applications (apps) compatible with our system.

## 2023-07-18 NOTE — DISCHARGE NOTE PROVIDER - HOSPITAL COURSE
61 yo male w/ pmhx of ESRD on HD MWF, chronic back pain, chronic pancreatitis, GERD, esophageal strictrue, recently hospitalized at [facility] VS then transferred to [facility] (hx of septic shock 2/2 pseudomonas pna/lung abscess, morganella bacteremia, had 2 stage revision of AVF) etoh abuse, DVT on eliquis who was admitted to [facility] for acute on chronic abdominal pain after running out of his pain medication. 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 SURGERY consulted with original plans to transfer pt to [facility] Medical director deemed transfer no necessary. No plans for emergent surgery at this time and pt found stable to be discharge from surgery standpoint. GASTRO consulted and CT abd/pelvis changes consistent with Puestow procedure. INFECTIOUS DISEASE consulted and pt found to be stable for DC w/o abx.

## 2023-07-18 NOTE — DISCHARGE NOTE PROVIDER - NSDCFUSCHEDAPPT_GEN_ALL_CORE_FT
[facility]  CARDIOLOGY [address] [facility]  Scheduled Appointment: 07/20/2023

## 2023-07-18 NOTE — CONSULT NOTE ADULT - SUBJECTIVE AND OBJECTIVE BOX
[facility]  INFECTIOUS DISEASES   [address] [place] [address]  Tel: [phone]     Fax: [phone]  ======================================================  Branden Cavazos,MD [place], DO Usha Mckenzie, JASON   ======================================================    IMELDA ROBERTSON  MRN-[id number]  Male  62y ([birth year])        Patient is a 62y old  Male who presents with a chief complaint of emphysematous pancreatitis (18 Jul 2023 15:25)      HPI:  62 yr old male with a pmh of ESRD on HD (MWF), chronic back pain (prescribed percocet), chronic pancreatitis, GERD, hx of ethanol abuse, DVT on eliquis, who presents with 3 days of progressive epigastric/abdominal pain. He reports the pain makes him more hungry. He ran out of his percocet 3 days ago and does not have an appointment with his PCP until 7/18.  Denies  headache, dizziness, chest pain, palpitations, SOB, joint pain, diarrhea/constipation, urinary symptoms.   Vitals: T 98, HR 77, /80, RR 18 satting 99% RA (14 Jul 2023 05:18)    patient essentially came in due to missing his pain meds for chronic pancreatitis   No recent instrumentation   no fevers or chills   ID consulted for workup and antibiotic management     PAST MEDICAL & SURGICAL HISTORY:  ETOH abuse  sober x1 year approximately      Hepatitis C  treated      Gout      HTN (hypertension)      H/O chronic pancreatitis      Wound of right foot      ESRD on hemodialysis      DVT (deep venous thrombosis)      Gastric perforation          Allergies  Tylenol (Other)        ANTIMICROBIALS:      MEDICATIONS  (STANDING):  meropenem  IVPB   100 mL/Hr IV Intermittent (07-18-23 @ 13:47)   100 mL/Hr IV Intermittent (07-17-23 @ 13:25)   100 mL/Hr IV Intermittent (07-16-23 @ 12:12)   100 mL/Hr IV Intermittent (07-15-23 @ 11:58)    meropenem  IVPB   100 mL/Hr IV Intermittent (07-14-23 @ 03:53)        OTHER MEDS: MEDICATIONS  (STANDING):      SOCIAL HISTORY:     Smoking Cigarettes [x]Active [ ] Former [ ]Denies   ETOH [ ]denies [x]Former [ ]Current Use denies   Drug Use [ xNever [ ] Former [ ] Active     FAMILY HISTORY:  FH: HTN (hypertension)        REVIEW OF SYSTEMS  [  ] ROS unobtainable because:    [ x] All other systems negative except as noted below:	    Constitutional:  [ ] fever [ ] chills  [ ] weight loss  [ ] weakness  Skin:  [ ] rash [ ] phlebitis	  Eyes: [ ] icterus [ ] pain  [ ] discharge	  ENMT: [ ] sore throat  [ ] thrush [ ] ulcers [ ] exudates  Respiratory: [ ] dyspnea [ ] hemoptysis [ ] cough [ ] sputum	  Cardiovascular:  [ ] chest pain [ ] palpitations [ ] edema	  Gastrointestinal:  [ ] nausea [ ] vomiting [ ] diarrhea [ ] constipation [ x pain	  Genitourinary:  [ ] dysuria [ ] frequency [ ] hematuria [ ] discharge [ ] flank pain  [ ] incontinence  Musculoskeletal:  [ ] myalgias [ ] arthralgias [ ] arthritis  [ ] back pain  Neurological:  [ ] headache [ ] seizures  [ ] confusion/altered mental status  Psychiatric:  [ ] anxiety [ ] depression	  Hematology/Lymphatics:  [ ] lymphadenopathy  Endocrine:  [ ] adrenal [ ] thyroid  Allergic/Immunologic:	 [ ] transplant [ ] seasonal    Vital Signs Last 24 Hrs  T(F): 97.6 (07-18-23 @ 17:23), Max: 98.7 (07-14-23 @ 15:40)    Vital Signs Last 24 Hrs  HR: 70 (07-18-23 @ 17:23) (70 - 71)  BP: 164/73 (07-18-23 @ 17:23) (133/67 - 164/73)  RR: 18 (07-18-23 @ 17:23)  SpO2: 99% (07-18-23 @ 17:23) (99% - 99%)  Wt(kg): --    PHYSICAL EXAM:  Constitutional: non-toxic, no distress, very thin and cachectic male   HEAD/EYES: anicteric, no conjunctival injection  ENT:  supple, no thrush  Cardiovascular:   normal S1, S2, no murmur, no edema  Respiratory:  clear BS bilaterally, no wheezes, no rales  GI:  soft, mild epigastric tenderness, normal bowel sounds  :  no chen, no CVA tenderness  Musculoskeletal:  no synovitis, normal ROM, LUE AV fistula with palpable thrill  Neurologic: awake and alert, normal strength, no focal findings  Skin:  no rash, no erythema, no phlebitis  Heme/Onc: no lymphadenopathy   Psychiatric:  awake, alert, appropriate mood          WBC Count: 7.38 K/uL (07-18 @ 06:15)  WBC Count: 8.27 K/uL (07-17 @ 17:30)  WBC Count: 4.49 K/uL (07-15 @ 05:47)  WBC Count: 4.71 K/uL (07-14 @ 13:45)  WBC Count: 6.70 K/uL (07-13 @ 22:00)      Auto Neutrophil %: 70.9 % (07-14-23 @ 13:45)  Auto Neutrophil #: 3.34 K/uL (07-14-23 @ 13:45)                            10.7   7.38  )-----------( 115      ( 18 Jul 2023 06:15 )             32.6       07-18    138  |  109<H>  |  36<H>  ----------------------------<  74  3.7   |  22  |  4.90<H>    Ca    6.9<L>      18 Jul 2023 06:15    TPro  5.1<L>  /  Alb  1.9<L>  /  TBili  0.3  /  DBili  x   /  AST  47<H>  /  ALT  84<H>  /  AlkPhos  233<H>  07-18      Creatinine Trend: 4.90<--, 6.90<--, 5.00<--, 3.51<--, 5.02<--, 4.47<--          Lipase: 38 U/L (07-18-23 @ 06:15)          MICROBIOLOGY:    COVID-19 PCR: NotDetec (18 Feb 2023 13:50)  COVID-19 PCR: NotDetec (15 Feb 2023 18:51)  COVID-19 PCR: NotDetec (08 Feb 2023 15:36)  COVID-19 PCR: NotDetec (02 Feb 2023 07:58)  SARS-CoV-2: NotDetec (26 Jan 2023 18:21)  COVID-19 PCR: NotDete (24 Jan 2023 13:56)          RADIOLOGY:  < from: CT Abdomen and Pelvis w/ IV Cont (07.13.23 @ 23:24) >  IMPRESSION:  Limited evaluation due to paucity of fat.    Sequela of chronic pancreatitis. New foci of air throughout the   pancreatic parenchyma, unclear if this is related to emphysematous   pancreatitis or possibly introduced if recently scoped/ERCP. Clinical   correlation is recommended.    Increased nonspecific patchy right lower lobe nodular opacities can be on   infectious or inflammatory basis.    < end of copied text >    I have personally reviewed the above imaging

## 2023-07-18 NOTE — CONSULT NOTE ADULT - SUBJECTIVE AND OBJECTIVE BOX
Chief Complaint:  Patient is a 62y old  Male who presents with a chief complaint of emphysematous pancreatitis (2023 16:40)      HPI:  Mr. Chaudhari is a 61yo M with PMH of ETOH abuse, chronic pancreatitis (s/p Puestow procedure 2022, on opiates), HCV s/p treatment (SVR), emphysema, CHF, HTN, benign esophageal stricture (s/p stent removal), ESRD, right UE DVT on Eliquis who presents with abdominal pain. GI consulted for "emphysematous pancreatitis".    Patient is followed by pain medicine for chronic pancreatitis and is prescribed percocet. He ran out of his analgesics and began experiencing severe epigastric pain, radiating to the back, similar to his chronic pancreatitis pain. As he did not have a followup appointment until , he went to the ED. Denies fever, chills. No change in baseline symptoms otherwise.      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 Started on Meropenem. Hospital course notable for stable hemodynamics throughout. Patient reports resolution of is symptoms with resumption of his opiates.      Allergies:  Tylenol (Other)      Home Medications:  apixaban 5 mg oral tablet: 1 tab(s) orally 2 times a day (2023 05:30)  bisacodyl 5 mg oral delayed release tablet: 1 tab(s) orally every 12 hours, As needed, Constipation (2023 05:30)  cadexomer iodine 0.9% topical gel: 1 application topically once a day (2023 05:30)  calcitriol 0.5 mcg oral capsule: 1 cap(s) orally once a day (2023 05:30)  folic acid 1 mg oral tablet: 1 tab(s) orally once a day (2023 05:30)  lidocaine 4% topical film: Apply topically to affected area once a day (2023 05:30)  metoclopramide 5 mg oral tablet: 1 tab(s) orally 3 times a day (2023 05:30)  Multiple Vitamins oral tablet: 1 tab(s) orally once a day (2023 05:30)  NIFEdipine 30 mg oral tablet, extended release: 1 tab(s) orally once a day (2023 05:30)  oxyCODONE 10 mg oral tablet: 1 tab(s) orally every 4 hours, As needed, Severe Pain (7 - 10) (2023 05:30)  pancrelipase 6000 units-19,000 units-30,000 units oral delayed release capsule: 1 cap(s) orally 3 times a day (with meals) (2023 05:30)  pantoprazole 40 mg oral delayed release tablet: 1 tab(s) orally every 12 hours (2023 05:30)  polyethylene glycol 3350 oral powder for reconstitution: 17 gram(s) orally 2 times a day (2023 05:30)  senna leaf extract oral tablet: 2 tab(s) orally once a day (at bedtime) (2023 05:30)  sucralfate 1 g oral tablet: 1 tab(s) orally every 6 hours (2023 05:30)  thiamine 100 mg oral tablet: 1 tab(s) orally once a day (2023 05:30)  tiZANidine 2 mg oral tablet: 1 tab(s) orally every 8 hours, As needed, muscle cramps (2023 05:30)    Hospital Medications:  acetaminophen     Tablet .. 650 milliGRAM(s) Oral every 6 hours PRN  aluminum hydroxide/magnesium hydroxide/simethicone Suspension 30 milliLiter(s) Oral every 4 hours PRN  apixaban 2.5 milliGRAM(s) Oral two times a day  dextrose 5% + sodium chloride 0.45%. 1000 milliLiter(s) IV Continuous <Continuous>  ibuprofen  Tablet. 400 milliGRAM(s) Oral once  melatonin 3 milliGRAM(s) Oral at bedtime PRN  meropenem  IVPB 500 milliGRAM(s) IV Intermittent every 24 hours  ondansetron Injectable 4 milliGRAM(s) IV Push every 8 hours PRN  oxyCODONE    IR 10 milliGRAM(s) Oral every 4 hours PRN      PMHX/PSHX:  ETOH abuse    Pancreatitis    Hepatitis C    Gout    HTN (hypertension)    Chronic kidney disease (CKD)    H/O chronic pancreatitis    Gout    Alcohol abuse    Wound of right foot    ESRD on hemodialysis    DVT (deep venous thrombosis)    No significant past surgical history    Gastric perforation    Gastric perforation        Family history:  No pertinent family history in first degree relatives    FH: HTN (hypertension)    FH: hypertension (Father)        Denies family history of colon cancer/polyps, stomach cancer/polyps, pancreatic cancer/masses, liver cancer/disease, ovarian cancer and endometrial cancer.    Social History:     Tob: Denies  EtOH: As above  Illicit Drugs: Denies    ROS:   General:  No wt loss, fevers, chills, night sweats, fatigue  Eyes:  Good vision, no reported pain  ENT:  No sore throat, pain, runny nose, dysphagia  CV:  No pain, palpitations, hypo/hypertension  Pulm:  No dyspnea, cough, tachypnea, wheezing  GI:  As per HPI  :  No pain, bleeding, incontinence, nocturia  Muscle:  No pain, weakness  Neuro:  No weakness, tingling, memory problems  Psych:  No fatigue, insomnia, mood problems, depression  Endocrine:  No polyuria, polydipsia, cold/heat intolerance  Heme:  No petechiae, ecchymosis, easy bruisability  Skin:  No rash, tattoos, scars, edema    PHYSICAL EXAM:   GENERAL:  No acute distress  HEENT:  Normocephalic/atraumatic, no scleral icterus  CHEST:  No accessory muscle use  HEART:  Regular rate and rhythm  ABDOMEN:  Soft, non-tender, non-distended  EXTREMITIES: No cyanosis, clubbing, or edema  SKIN:  No rash  NEURO:  Alert and oriented x 3, no asterixis    Vital Signs:  Vital Signs Last 24 Hrs  T(C): 36.5 (2023 11:42), Max: 36.8 (2023 20:30)  T(F): 97.7 (2023 11:42), Max: 98.3 (2023 20:30)  HR: 71 (2023 11:42) (66 - 78)  BP: 133/67 (2023 11:42) (133/67 - 157/77)  BP(mean): --  RR: 18 (2023 11:42) (16 - 18)  SpO2: 99% (2023 11:42) (98% - 100%)    Parameters below as of 2023 11:42  Patient On (Oxygen Delivery Method): room air      Daily     Daily Weight in k (2023 20:30)    LABS:                        10.7   7.38  )-----------( 115      ( 2023 06:15 )             32.6     Mean Cell Volume: 99.1 fl (07-18-23 @ 06:15)        138  |  109<H>  |  36<H>  ----------------------------<  74  3.7   |  22  |  4.90<H>    Ca    6.9<L>      2023 06:15    TPro  5.1<L>  /  Alb  1.9<L>  /  TBili  0.3  /  DBili  x   /  AST  47<H>  /  ALT  84<H>  /  AlkPhos  233<H>      LIVER FUNCTIONS - ( 2023 06:15 )  Alb: 1.9 g/dL / Pro: 5.1 gm/dL / ALK PHOS: 233 U/L / ALT: 84 U/L / AST: 47 U/L / GGT: x             Urinalysis Basic - ( 2023 06:15 )    Color: x / Appearance: x / SG: x / pH: x  Gluc: 74 mg/dL / Ketone: x  / Bili: x / Urobili: x   Blood: x / Protein: x / Nitrite: x   Leuk Esterase: x / RBC: x / WBC x   Sq Epi: x / Non Sq Epi: x / Bacteria: x      Amylase Serum--      Lipase serum38       Ammonia--                          10.7   7.38  )-----------( 115      ( 2023 06:15 )             32.6                         10.6   8.27  )-----------( 138      ( 2023 17:30 )             32.5       Imaging:  < from: CT Abdomen and Pelvis w/ IV Cont (23 @ 23:24) >    ACC: [id number] EXAM:  CT ABDOMEN AND PELVIS IC   ORDERED BY: ABDELRAHMAN ROSARIO     PROCEDURE DATE:  2023          INTERPRETATION:  CLINICAL INFORMATION: Abdominal pain. PMH ESRD on HD   (MWF), chronic back pain (prescribed percocet), hx pancreatispw abd   pain. Pt states ran out of percocet x3 days ago, his pain management   doctor is out of town, and he has new PCP appt scheduled for . Pt   reports his face & eyes swell when he does not have his pain medication.   Denies fever, CP, SOB, palpitations, cough, N/V/D/C. Pt states he does   make urine, denies dysuria / hematuria. Denies LE pain / swelling. Pt   last went for HD yesterday.    COMPARISON: CT of the abdomen and pelvis 2023    CONTRAST/COMPLICATIONS:  IV Contrast: Omnipaque 350  96 cc administered   04 cc discarded  Oral Contrast: NONE  Complications: None reported at time of study completion    The PROCEDURE:  CT of the Abdomen and Pelvis was performed.  Sagittal and coronal reformats were performed.    FINDINGS:  Limited evaluation due to marked paucity of intraperitoneal fat.    LOWER CHEST: Increased nonspecific nodular airspace opacities in the   right lung base and resolved in the right middle lobe. Distal esophageal   wall thickening similar to the prior exam.    LIVER: Within normal limits.  BILE DUCTS: Mild intrahepatic biliary duct dilatation. Dilated CBC to 11   mm without radiopaque stone  GALLBLADDER: Within normal limits.  SPLEEN: Within normal limits.  PANCREAS: Chronic pancreatitis with atrophy of the gland and diffuse   coarse calcifications throughout. The main pancreatic duct does not   appear to be dilated. There are scattered foci of air throughout the   pancreatic parenchyma which is new from the prior exam. Limited   assessment for peripancreatic inflammatory changes due to paucity of fat.   No peripancreatic fluid collection.  ADRENALS: Within normal limits.  KIDNEYS/URETERS: Within normal limits.    BLADDER: Underdistended and suboptimally assessed.  REPRODUCTIVE ORGANS: Prostate appears to be enlarged.    BOWEL: No bowel obstruction. No evidence of appendicitis. No bowel wall   thickening.  PERITONEUM: No ascites.  VESSELS: Within normal limits.  RETROPERITONEUM/LYMPH NODES: No lymphadenopathy.  ABDOMINAL WALL: Within normal limits.  BONES: Within normal limits.    IMPRESSION:  Limited evaluation due to paucity of fat.    Sequela of chronic pancreatitis. New foci of air throughout the   pancreatic parenchyma, unclear if this is related to emphysematous   pancreatitis or possibly introduced if recently scoped/ERCP. Clinical   correlation is recommended.    Increased nonspecific patchy right lower lobe nodular opacities can be on   infectious or inflammatory basis.    --- End of Report ---            BRITTANY MENA; Attending Radiologist  This document has been electronically signed. 2023 12:23AM    < end of copied text >

## 2023-07-18 NOTE — CONSULT NOTE ADULT - ASSESSMENT
# Abnormal CT imaging - findings of " foci of air throughout the pancreatic parenchyma" can be seen after a Puestow procedure. Low suspicion for emphysematous pancreatitis given preserved hemodynamics and overall well appearance. As such, no role for antibiotics or escalation of care.  # Chronic pancreatitis - s/p Puestow, follows with pain medicine. At baseline.  # ESRD    Recommendations:  - No objections to discharge and outpatient followup with surgery and GI  - Follow up with pain medicine  - Low fat diet  - c/w Creon    Thank you for involving us in the care of this patient. Please reach out if any further questions.    Daniel Head  Gastroenterology    Available on [employer] Teams  [phone]

## 2023-07-18 NOTE — DISCHARGE NOTE PROVIDER - NSTOBACCOUSAGEY/N_GEN_A_CS
No
CONSTITUTIONAL: No weakness, fevers or chills  EYES/ENT: No visual changes;  No vertigo or throat pain   NECK: No pain or stiffness  RESPIRATORY: No cough, wheezing, hemoptysis; No shortness of breath  CARDIOVASCULAR: No chest pain or palpitations  GASTROINTESTINAL: No abdominal or epigastric pain. No nausea, vomiting, or hematemesis; No diarrhea or constipation. No melena or hematochezia.  GENITOURINARY: No dysuria, frequency or hematuria  NEUROLOGICAL: No numbness or weakness  SKIN: ankle and calf pain, swelling.  PSYCH: No Depression, no anxiety  All other review of systems is negative unless indicated above.
Normal for race

## 2023-07-18 NOTE — CONSULT NOTE ADULT - ASSESSMENT
62 yr old male with a pmh of ESRD on HD (MWF), chronic back pain (prescribed percocet), chronic pancreatitis, GERD, hx of ethanol abuse, DVT on eliquis, who presents with 3 days of progressive epigastric/abdominal pain. 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 Isiah Gardiner, DO  Infectious Disease Attending  Reachable via Microsoft Teams or ID office: [phone]  After 5pm/weekends please call [phone] for all inquiries and new consults

## 2023-07-21 PROBLEM — I82.409 ACUTE EMBOLISM AND THROMBOSIS OF UNSPECIFIED DEEP VEINS OF UNSPECIFIED LOWER EXTREMITY: Chronic | Status: ACTIVE | Noted: 2023-01-01

## 2023-07-21 PROBLEM — N18.6 END STAGE RENAL DISEASE: Chronic | Status: ACTIVE | Noted: 2023-01-01

## 2023-07-25 PROBLEM — N18.6 ESRD ON DIALYSIS: Status: ACTIVE | Noted: 2023-01-01

## 2023-07-25 PROBLEM — R09.89 ABSENT PEDAL PULSES: Status: ACTIVE | Noted: 2023-01-01

## 2023-07-25 PROBLEM — L60.2 THICKENED NAILS: Status: ACTIVE | Noted: 2023-01-01

## 2023-07-25 PROBLEM — I73.9: Status: ACTIVE | Noted: 2023-01-01

## 2023-07-25 PROBLEM — I50.20 HFREF (HEART FAILURE WITH REDUCED EJECTION FRACTION): Status: ACTIVE | Noted: 2023-01-01

## 2023-07-25 PROBLEM — K29.00 ACUTE GASTRITIS: Status: ACTIVE | Noted: 2022-02-09

## 2023-07-25 PROBLEM — M21.611 BUNION, RIGHT FOOT: Status: ACTIVE | Noted: 2023-01-01

## 2023-07-26 NOTE — ED ADULT NURSE REASSESSMENT NOTE - NS ED NURSE REASSESS COMMENT FT1
pt received A&o x4 breathing unlabored on room air, pt denies any pain at this time, Heplock to R upper arm patent and intact. Pt awaiting to transfer to holding area, continuous monitoring in place. O-Z Plasty Text: The defect edges were debeveled with a #15 scalpel blade.  Given the location of the defect, shape of the defect and the proximity to free margins an O-Z plasty (double transposition flap) was deemed most appropriate.  Using a sterile surgical marker, the appropriate transposition flaps were drawn incorporating the defect and placing the expected incisions within the relaxed skin tension lines where possible.    The area thus outlined was incised deep to adipose tissue with a #15 scalpel blade.  The skin margins were undermined to an appropriate distance in all directions utilizing iris scissors.  Hemostasis was achieved with electrocautery.  The flaps were then transposed into place, one clockwise and the other counterclockwise, and anchored with interrupted buried subcutaneous sutures.

## 2023-07-27 NOTE — ASSESSMENT
[FreeTextEntry1] : \par SW notified to assist the patient with transportation for surgery appointment 7/28/23 at 1:30. SW aware that patient also has dialysis scheduled for 7/28 at 1030. \par \par #HCM\par GI referral\par Vascular referral.\par and Podiatry referral provided.\par \par RTC in 3 months or early if needed. \par \par The plan of care was discussed with the patient in full detail. He verbalized understanding and in agreement with the plan of care.

## 2023-07-27 NOTE — PHYSICAL EXAM
"Chief complaints:  #1.  Increasing left medial knee pain  #2.  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 Magaly Sosa    HPI: She is a long history of DJD of both knees, worse per x-ray on the right.  At present her right knee symptoms are under control with sulindac and occasional hydrocodone.  The left knee has been bothering her more than the right lately.  On 10/10/2018 she received a Gel 1 injection into the left knee.    Subjective: Today she reports that the left medial knee pain has been increasing significantly over the past couple weeks with no injury or overuse episode.  The pain now interferes with her sleep at night.  She called the office a few days ago about this and she was added onto today's schedule.    Nothing is changed with respect to her musculoskeletal or neurologic review of systems since seen last.    Examination:/60  Pulse 70  Temp 98.7  F (37.1  C) (Tympanic)  Ht 5' 5\" (1.651 m)  Wt 236 lb (107 kg)  SpO2 97%  BMI 39.27 kg/m2  Overweight elderly female no obvious distress.  The left knee has no effusion or deformity.  It is tender to palpation along the medial joint line only.  It is stable to ligamentous stressing.  Its range of motion is 0-115 degrees.  Extension strength is normal.  The Laura's maneuvers are unremarkable.  The neurovascular status of that limb is intact.    X-rays: On 2/15/2018 left knee plain films were unremarkable.    Impression: Left medial knee pain.  Suspect degenerative meniscal tear    Plan: The patient requested an MRI.  I feel that is a reasonable request.  The order was placed and she will be seen afterwards to discuss the results.    " [No Acute Distress] : no acute distress [Normal Outer Ear/Nose] : the outer ears and nose were normal in appearance [Normal TMs] : both tympanic membranes were normal [Normal Nasal Mucosa] : the nasal mucosa was normal [No Lymphadenopathy] : no lymphadenopathy [No Respiratory Distress] : no respiratory distress  [No Accessory Muscle Use] : no accessory muscle use [Clear to Auscultation] : lungs were clear to auscultation bilaterally [Normal Rate] : normal rate  [Regular Rhythm] : with a regular rhythm [Normal S1, S2] : normal S1 and S2 [No Murmur] : no murmur heard [Soft] : abdomen soft [Non Tender] : non-tender [de-identified] : unkempt [de-identified] : unable to palpate pedal pulses [de-identified] : right foot 3rd toe dry healed ulcer. B/L lower extrimities hyperpigmented with thickened toe nails

## 2023-07-27 NOTE — REVIEW OF SYSTEMS
[Abdominal Pain] : abdominal pain [Back Pain] : back pain [Fever] : no fever [Chills] : no chills [Fatigue] : no fatigue [Chest Pain] : no chest pain [Palpitations] : no palpitations [Shortness Of Breath] : no shortness of breath [Wheezing] : no wheezing [Cough] : no cough [Nausea] : no nausea [Constipation] : no constipation [Diarrhea] : no diarrhea [Dysuria] : no dysuria [Headache] : no headache [Dizziness] : no dizziness [FreeTextEntry9] : g

## 2023-07-27 NOTE — HISTORY OF PRESENT ILLNESS
[Post-hospitalization from ___ Hospital] : Post-hospitalization from [unfilled] Hospital [Admitted on: ___] : The patient was admitted on [unfilled] [Discharged on ___] : discharged on [unfilled] [FreeTextEntry2] : Hospital Course: Discharge Date 18-Jul-2023 \par Admission Date 14-Jul-2023 \par  Reason for Admission :Emphysematous pancreatitis\par  \par Hospital Course:  63 yo male w/ pmhx of ESRD on HD MWF, chronic back pain, chronic pancreatitis, GERD, esophageal strictrue, recently hospitalized at [facility] VS then transferred to [facility] (hx of septic shock 2/2 pseudomonas pna/lung abscess, morganella bacteremia, had 2 stage revision of AVF) etoh abuse, DVT on eliquis who was admitted to [facility] for acute on chronic abdominal pain after running out of his pain medication. 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 SURGERY consulted with original plans to transfer pt to [facility]. Medical director deemed transfer no necessary. No plans for emergent surgery at this time and pt found stable to be discharge from surgery standpoint. GASTRO consulted and CT abd/pelvis changes consistent with [place] procedure. INFECTIOUS DISEASE consulted and pt found to be stable for DC w/o abx.\par \par \par This is a 61 y/o male who presents today for a post hospital discharge f/u with a brief hospital course noted above.He reports that he is still experiencing intermittent abdominal pain but denies N/V. He has been taking Percocet Q4 hrs with mild relief of pain. He thinks  that he needs stronger pain medications to control his pain. will refer to Pain management.\par The patient has appointment scheduled with surgery on 7/28/23. \par Will assist the patient to schedule Gastroenterology appointment.

## 2023-08-04 NOTE — HISTORY OF PRESENT ILLNESS
[de-identified] : 61 yo male w/ pmhx of ESRD on HD MWF, chronic pancreatitis s/p lateral pancreatojejunostomy on 7/22/22.   Recently hospitalized at [facility]  (hx of septic shock 2/2 pseudomonas pna/lung abscess, morganella bacteremia, had 2 stage revision of AVF) etoh abuse, DVT on eliquis who was admitted to [facility] for acute on chronic abdominal pain after running out of his pain medication.   PMH:  ESRD, chronic back pain, chronic pancreatitis, GERD, esophageal stricture  He lives with his 87yo mother.

## 2023-08-04 NOTE — REASON FOR VISIT
[Follow-Up Visit] : a follow-up visit for [FreeTextEntry2] : chronic pancreatitis s/p lateral pancreatojejunostomy

## 2023-08-04 NOTE — PHYSICAL EXAM
[FreeTextEntry1] : General: Cachectic, uncomfortable appearing  Head: AT/NC Eyes: PERRL. EOMI. No scleral icterus Pulmonary: No respiratory distress. Clear to auscultation bilaterally. No wheezes, rales, or rhonchi.  CV: Regular rate and rhythm. No chest wall tenderness. Distal pulses intact. Good capillary refill. ABD: Soft. NT/ND. No rebound, no guarding, no rigidity. No peritoneal signs. No masses. Well healed surgical incision Extremities: Warm. No edema. 2+ pulses. Neurological: A&O x 4. Psychiatric: Normal affect and mood.

## 2023-08-04 NOTE — ASSESSMENT
[FreeTextEntry1] : 62M with ESRC on HD, alcohol abuse and chronic pancreatitis s/p lateral pancreatojejunostomy in July 2022. He has had significant overall decline in health since last year. Unable to carry on conversation. Due for dialysis later today.  Certainly, no further surgical options for pain. He follows with his primary care physician for pain management. He asked about other options for pain management. Will refer to Dr. Ruben Cha for evaluation for ? celiac block.

## 2023-08-10 NOTE — REASON FOR VISIT
[Initial Consultation] : an initial pain management consultation [FreeTextEntry2] : Refractory abdominal pain due to chronic pancreatitis

## 2023-08-10 NOTE — ASSESSMENT
[FreeTextEntry1] : 62 year-old male with history of refractory abdominal pain with a history of chronic pancreatitis as well as a history of chronic back pain.  The patient is referred for consideration of a celiac plexus block to aid with control of his refractory abdominal pain.  Plan:  1.  It seems reasonable to offer a diagnostic celiac plexus block to see if there is a visceral sympathetic component to his abdominal pain.  The patient was apprised of the procedure and is interested in proceeding. 2.  The question of his Eliquis will need to be addressed.  It is not advisable to stop the medication unilaterally without discussing with his prescribing physician.  I have reached out to Dr. Pathak to see if she might be able to assist in ascertaining whom we might contact in that regard. 3.  I spoke briefly with Dr. Mcnamara today, who will continue to see the patient for his chronic back pain.

## 2023-08-10 NOTE — HISTORY OF PRESENT ILLNESS
[FreeTextEntry1] : 62 year-old male with history of refractory abdominal pain in the setting of chronic pancreatitis s/p pancreaticojejunostomy.  The patient describes his pain as 10/10 in intensity and intermittent in character.  He takes Percocet 5/325 mg q4h prn for his pain.  His picture is complicated a bit by a longstanding history of chronic back pain, for which he sees my colleague Dr. Paty Mcnamara in [place].  The patient reports he has tried both gabapentin and duloxetine, but does not tolerate either due to side effects of weakness, per him even at low doses.  The patient was referred specifically for the question of the utility of a potential celiac plexus block to aid with his abdominal pain control.  One additional consideration is that he was started on Eliquis somewhat recently for what appears to be the concern of lower extremity vascular disease.  It appears that a Vascular Surgery consultation was planned, but I do not see a record of that taking place, and the patient is unable to relate who is the prescribing physician for his Eliquis.

## 2023-08-10 NOTE — PHYSICAL EXAM
[Normal muscle bulk without asymmetry] : normal muscle bulk without asymmetry [Walker] : ambulates with walker [Normal] : Normal affect [de-identified] : No jaundice appreciated on exam today. [de-identified] : Abdomen is tender to palpation in the midepigastric region. [de-identified] : Cranial nerves appear grossly intact.

## 2023-08-10 NOTE — ADDENDUM
[FreeTextEntry1] : I spent a total of 60 minutes in patient care today, consisting of data review, patient examination, and discussion of his care.  Ruben Cha M.D., M.A.

## 2023-08-24 NOTE — ED ADULT TRIAGE NOTE - CHIEF COMPLAINT QUOTE
BIBA- from home  Mom saw him sleeping all day and did not disturb him  Unresponsive  EMS FS 24, Unable to obtain access, 1 glucagon IM given and 1narcan 2mg IN/1narcan 2mg IM BIBA- from home  Mom saw him sleeping all day and did not disturb him  Unresponsive upon arrival. Unable to obtain VS  EMS FS 24, Unable to obtain access, 1 glucagon IM given and 1narcan 2mg IN/1narcan 2mg IM. EMS /90, .  PMH ESRD on HD (MWF), chronic back pain (prescribed percocet), hx pancreatis

## 2023-08-24 NOTE — ED ADULT NURSE NOTE - CHIEF COMPLAINT QUOTE
BIBA- from home  Mom saw him sleeping all day and did not disturb him  Unresponsive upon arrival. Unable to obtain VS  EMS FS 24, Unable to obtain access, 1 glucagon IM given and 1narcan 2mg IN/1narcan 2mg IM. EMS /90, .  PMH ESRD on HD (MWF), chronic back pain (prescribed percocet), hx pancreatis

## 2023-08-24 NOTE — ED ADULT NURSE REASSESSMENT NOTE - NS ED NURSE REASSESS COMMENT FT1
Initial FS upon arrival 10 as previously noted, D50 pushed IV, repeat FS found to be 26, D50 pushed again and FS found to be 219.
Pt intubated by Dr. Villanueva, respiratory therapist and primary RN at bedside. Etomidate and Rocuronium given to patient prior to intubation. No O2 sat available at this time after several attempts made, Dr. Villanueva made aware.
Pt intubated, et tube 7.5, 23 at the lip. Iv access attempted by 2 nurses. L IO access obtained by JASON Fosetr and Dr. Villanueva obtained access to L Ej. IV access assessed for patency, Midazolam infusing. Blood work sent to lab. Kenney catheter 16 Kinyarwanda placed, securement device in place. Had loose bowel movement, pt cleaned. Skin tear found to l scrotum upon arrival. Safety checks completed and maintained. T+P Q2h encouraged. Fall and skin precautions in place.

## 2023-08-24 NOTE — ED PROVIDER NOTE - OBJECTIVE STATEMENT
63 yo M bibems as overdose/AMS.  Pt. cannot provide history due to condition.  Pt. reportedly takes multiple narcotics and has been sleeping all day.  Unknown last normal.  EMS called as pt. became unresponsive.  FS on scene was 24--glucagon given by EMS, also narcan with minimal response.  No other complaints communicated thus far.   ROS: unobtainable from patient   PMH: ESRD on HD MWF, chronic back pain, chronic pancreatitis, GERD, esophageal strictrue, recently hospitalized at [facility] then transferred to [facility] (hx of septic shock 2/2 pseudomonas pna/lung abscess, morganella bacteremia, had 2 stage revision of AVF) etoh abuse, DVT on eliquis; Meds: See EMR for list; SH: Denies smoking/drinking/drug use

## 2023-08-24 NOTE — ED PROVIDER NOTE - CARE PLAN
1 Principal Discharge DX:	AMS (altered mental status)  Secondary Diagnosis:	Hypoglycemia  Secondary Diagnosis:	Opiate abuse, continuous   Principal Discharge DX:	AMS (altered mental status)  Secondary Diagnosis:	Hypoglycemia  Secondary Diagnosis:	Opiate abuse, continuous  Secondary Diagnosis:	PNA (pneumonia)

## 2023-08-24 NOTE — H&P ADULT - CRITICAL CARE ATTENDING COMMENT
[place]: I have seen and examined the patient face to face, have reviewed and addended the HPI, PE and a/p as necessary.    61 yo M with ETOH  Abuse, Chronic Pancreatitis, HCV (s/p Tx with SVR), COPD, Esophageal Stricture (s/p Stenting 4/22 with subsequest removal 12/16/2022), DVT ( on Eliquis), ESRD ( on HD), Prior Lung Abscess (Pseudomonas / Morgenella), HTN, Chronic Systolic CHF, Chronic Pain ( Narc Dependant), Recent Hospitalization 7/2023 for Emphysematous Pancreatitis a/w EMS after being found unresponsive at home.  Noted to be "sleeping" all day.  Concern for Narcotic OD with multiple low FS despite multiple amps of D50.  Patient was intubated in ED.  Remains unresponsive on Vent on exam and unable to assist with HPI or ROS.     Gen: intubated, on low sedation, cachetic appearing.  HEENT: Intubated with ETT, PERRLA  CARD -s1s2, RRR, no M,G,R;   PULM - Coarse vented breath sounds, symmetric breath sounds;   ABD -  +BS, ND, NT, soft, no guarding, no rebound, no masses;   EXT - symmetric pulses, no edema;   NEURO - unresponsive                          11.2   4.39  )-----------( 80       ( 25 Aug 2023 02:00 )             32.9   08-25    136  |  102  |  50<H>  ----------------------------<  293<H>  3.0<L>   |  25  |  3.81<H>    Ca    7.3<L>      25 Aug 2023 02:00  Phos  3.8     08-25  Mg     2.0     08-25    TPro  5.3<L>  /  Alb  2.0<L>  /  TBili  0.5  /  DBili  x   /  AST  932<H>  /  ALT  718<H>  /  AlkPhos  863<H>  08-25  Creatine Kinase, Serum: 143 U/L (08.24.23 @ 19:50)     Admit to ICU for 1) AMS 2) suspeected opioid OD 3) Profoundly Hypoglycemic 4) B/L PNA noted on CT likely ASP PNA     Neuro: Intubated and sedated, change to fentanyl, RASS 0 to -1; wean sedation in AM for SBT / SAT trials; profoundly hypoglycemic on arrival, will reassess neuro status   CV: BP and HR within acceptable ranges; maintain map >65  Pulm: Intubated for AMS, found to have bilateral PNA on CT; will start abx; adjust vent as per ABG.   GI:NPO; can start feeds in AM; trend LFTs  Renal/Metabolic: ESRD on HD; Trend electrolytes  ID: Abx for aspiration pneumonia  Heme:HSQ for DVT PPx  Endo:Monitor FS; q2H; if remains low, start standing dextrose fluids  Dispo: Admit to ICU; full code.    Patient was critically ill with a high probability of imminent or life threatening deterioration.    I have performed direct patient care (not related to procedure), additional history taking, interpretation of diagnostic studies, documentation.

## 2023-08-24 NOTE — H&P ADULT - NSHPPHYSICALEXAM_GEN_ALL_CORE
Physical Examination:    General:  Cachectic appearing, unresponsive     HEENT: Pupils equal, reactive to light.  Symmetric. No JVD. ET tube     PULM: Fine rhonchi predominate anterior fields,  no significant sputum production    CVS: Regular rate and rhythm, no murmurs, rubs, or gallops    ABD: Soft, nondistended, nontender, normoactive bowel sounds, no masses Midline scar noted     EXT: +1  LE edema     SKIN: Warm and well perfused, no rashes noted.

## 2023-08-24 NOTE — ED ADULT NURSE NOTE - NSFALLHARMRISKINTERV_ED_ALL_ED
Assistance OOB with selected safe patient handling equipment if applicable/Communicate risk of Fall with Harm to all staff, patient, and family/Encourage patient to sit up slowly, dangle for a short time, stand at bedside before walking/Provide patient with walking aids/Provide visual cue: red socks, yellow wristband, yellow gown, etc/Reinforce activity limits and safety measures with patient and family/Review medications for side effects contributing to fall risk/Toileting schedule using arm’s reach rule for commode and bathroom/Bed in lowest position, wheels locked, appropriate side rails in place/Call bell, personal items and telephone in reach/Instruct patient to call for assistance before getting out of bed/chair/stretcher/Non-slip footwear applied when patient is off stretcher/[place] to call system/Physically safe environment - no spills, clutter or unnecessary equipment/Purposeful Proactive Rounding/Room/bathroom lighting operational, light cord in reach

## 2023-08-24 NOTE — H&P ADULT - ASSESSMENT
62 year old male PMHx Alcohol Abuse, Chronic Pancreatitis, HCV (s/p Tx with SVR), COPD, Esophageal Stricture (s/p Stenting 4/22 with subsequest removal 12/16/2022), DVT ( on Eliquis), ESRD ( on HD), Prior Lung Abscess (Pseudomonas / Morgenella), HTN, Chronic Systolic CHD, Chronic Pain ( Narc Dependant), 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  Admitted 8/24/2023 after being found unresponsive at home.       Acute Hypercarbic Respiratory Failure  Suspected Narcotics contributing   Profoundly Hypoglycemic  B/L PNA noted on CT likely ASP PNA       Admit to ICU level of care   Light sedation with versed from ED   Will Change to Fentanyl given Narc dependance to avoid WD  Fio2 has been titrated down and will continue to actiovly titrate to keep Sats > 90%  Rate increased to 26 as was overbreathing to 22-24   Will Recheck ABG  post vent changes  DuoNebs Q 6 hrs   ABX for ASP PNA   HDS thus far   NPO / NGT to LCWS / PPI   Watch Lytes   Making urine despite ESRD / HD Per nephro   Eliquis BID for Hx DVT adn PPX   Monitor BS Q 2 hr for now - not sure why he was profoundly hypoglycemic - poss bacteremia   S/P Vanco X 1 dose - dose per trough   Zosyn for ASP PNA   F/U Cultures   Case D/W Dr Flowers ICU attending       CRITICAL CARE TIME SPENT: 50 minutes   (Assessing presenting problems of acute illness, which pose high probability of life threatening deterioration or end organ damage/dysfunction, as well as medical decision making including initiating plan of care, reviewing data, reviewing radiologic exams, discussing with multidisciplinary team,  discussing goals of care with patient/family, and writing this note.  Non-inclusive of procedures performed)        62 year old male PMHx Alcohol Abuse, Chronic Pancreatitis, HCV (s/p Tx with SVR), COPD, Esophageal Stricture (s/p Stenting 4/22 with subsequest removal 12/16/2022), DVT ( on Eliquis), ESRD ( on HD), Prior Lung Abscess (Pseudomonas / Morgenella), HTN, Chronic Systolic CHD, Chronic Pain ( Narc Dependant), 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  Admitted 8/24/2023 after being found unresponsive at home.       Acute Hypercarbic Respiratory Failure  Suspect  Narcotics contributing to Type 2 respiratory failure   Profoundly Hypoglycemic  B/L PNA noted on CT likely ASP PNA       Admit to ICU level of care   Light sedation with versed from ED   Will Change to Fentanyl given Narc dependance to avoid WD  Fio2 has been titrated down and will continue to actiovly titrate to keep Sats > 90%  Rate increased to 26 as was overbreathing to 22-24   Will Recheck ABG  post vent changes  DuoNebs Q 6 hrs   ABX for ASP PNA   HDS thus far   NPO / NGT to LCWS / PPI   Watch Lytes   Making urine despite ESRD / HD Per nephro   Eliquis BID for Hx DVT adn PPX   Monitor BS Q 2 hr for now - not sure why he was profoundly hypoglycemic - poss bacteremia   S/P Vanco X 1 dose - dose per trough   Zosyn for ASP PNA   F/U Cultures   Case D/W Dr Flowers ICU attending       CRITICAL CARE TIME SPENT: 50 minutes   (Assessing presenting problems of acute illness, which pose high probability of life threatening deterioration or end organ damage/dysfunction, as well as medical decision making including initiating plan of care, reviewing data, reviewing radiologic exams, discussing with multidisciplinary team,  discussing goals of care with patient/family, and writing this note.  Non-inclusive of procedures performed)        62 year old male PMHx Alcohol Abuse, Chronic Pancreatitis, HCV (s/p Tx with SVR), COPD, Esophageal Stricture (s/p Stenting 4/22 with subsequest removal 12/16/2022), DVT ( on Eliquis), ESRD ( on HD), Prior Lung Abscess (Pseudomonas / Morgenella), HTN, Chronic Systolic CHD, Chronic Pain ( Narc Dependant), 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  Admitted 8/24/2023 after being found unresponsive at home.       Acute Hypercarbic Respiratory Failure  Suspect  Narcotics contributing to Type 2 respiratory failure   Profoundly Hypoglycemic  B/L PNA noted on CT likely ASP PNA       Admit to ICU level of care   Light sedation with versed from ED   Will Change to Fentanyl given Narc dependance to avoid WD  Fio2 has been titrated down and will continue to actiovly titrate to keep Sats > 90%  Rate increased to 26 as was overbreathing to 22-24   Will Recheck ABG  post vent changes  DuoNebs Q 6 hrs   ABX for ASP PNA   HDS thus far   NPO / NGT to LCWS / PPI   Watch Lytes   Making urine despite ESRD / HD Per nephro   Eliquis BID for Hx DVT   Monitor BS Q 2 hr for now - not sure why he was profoundly hypoglycemic - poss bacteremia   S/P Vanco X 1 dose - dose per trough   Zosyn for ASP PNA   F/U Cultures   Case D/W Dr Flowers ICU attending       CRITICAL CARE TIME SPENT: 50 minutes   (Assessing presenting problems of acute illness, which pose high probability of life threatening deterioration or end organ damage/dysfunction, as well as medical decision making including initiating plan of care, reviewing data, reviewing radiologic exams, discussing with multidisciplinary team,  discussing goals of care with patient/family, and writing this note.  Non-inclusive of procedures performed)        62 year old male PMHx Alcohol Abuse, Chronic Pancreatitis, HCV (s/p Tx with SVR), COPD, Esophageal Stricture (s/p Stenting 4/22 with subsequest removal 12/16/2022), DVT ( on Eliquis), ESRD ( on HD), Prior Lung Abscess (Pseudomonas / Morgenella), HTN, Chronic Systolic CHF, Chronic Pain ( Narc Dependant), 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  Admitted 8/24/2023 after being found unresponsive at home.       Acute Hypercarbic Respiratory Failure  Suspect  Narcotics contributing to Type 2 respiratory failure   Profoundly Hypoglycemic  B/L PNA noted on CT likely ASP PNA       Admit to ICU level of care   Light sedation with versed from ED   Will Change to Fentanyl given Narc dependance to avoid WD  Fio2 has been titrated down and will continue to actiovly titrate to keep Sats > 90%  Rate increased to 26 as was overbreathing to 22-24   Will Recheck ABG  post vent changes  DuoNebs Q 6 hrs   ABX for ASP PNA   HDS thus far   NPO / NGT to LCWS / PPI   Watch Lytes   Making urine despite ESRD / HD Per nephro   Eliquis BID for Hx DVT   Monitor BS Q 2 hr for now - not sure why he was profoundly hypoglycemic - poss bacteremia   S/P Vanco X 1 dose - dose per trough   Zosyn for ASP PNA   F/U Cultures   Case D/W Dr Flowers ICU attending       CRITICAL CARE TIME SPENT: 50 minutes   (Assessing presenting problems of acute illness, which pose high probability of life threatening deterioration or end organ damage/dysfunction, as well as medical decision making including initiating plan of care, reviewing data, reviewing radiologic exams, discussing with multidisciplinary team,  discussing goals of care with patient/family, and writing this note.  Non-inclusive of procedures performed)

## 2023-08-24 NOTE — ED PROVIDER NOTE - PHYSICAL EXAMINATION
Gen: non verbal, making sounds, moving all extremities purposelessly, NAD, laying in stretcher, emaciated build  Head: ncat, perrla, eomi b/l  Neck: supple, no lymphadenopathy, no midline deviation  Heart: rrr, no m/r/g  Lungs: CTA b/l, no rales/ronchi/wheezes  Abd: soft, nontender, non-distended, no rebound or guarding  Ext: no clubbing/cyanosis/edema  Neuro: not cooperative for exam, withdraws from pain, makes sounds, no clear focal deficit identified

## 2023-08-24 NOTE — ED PROVIDER NOTE - CLINICAL SUMMARY MEDICAL DECISION MAKING FREE TEXT BOX
63 yo M with AMS, concerning for overdose, cva, hypoglycemia, sepsis  -cbc, cmp, coags, blood cx, abg, lactate, lipase finger stick q hr, rvp, trop, ua/cx, tsh, asa/tylenol levels  -CT brain/cervical/chest/abd/pel, CXR, ekg, vanc/zosyn coverage, sepsis fluid bolus, monitor  -f/u results, reeval, will likely intubate as pt. cannot protect airway

## 2023-08-24 NOTE — ED ADULT NURSE NOTE - OBJECTIVE STATEMENT
Pt brought in by EMS, reported to have been "pretty much the whole day", no specific downtime given. Reports that pt "takes opioids chronically." Pt on nonrebreather upon arrival, Dr. Villanueva at bedside, decision made to intubate. Receives dialysis, scheduled days unknown, L arm fistula. FS on scene was 10, pt given glucagon. FS 24 upon arrival, given D50 3 times. Rectal temp 91.2, placed on bear hugger. Skin tear to l scrotum. Pt brought in by EMS, reported to have been "pretty much the whole day", no specific downtime given. Reports that pt "takes opioids chronically." Pt on nonrebreather upon arrival, Dr. Villanueva at bedside, unable to obtain O2 sat. Receives dialysis, scheduled days unknown, L arm fistula. FS on scene was 10, pt given glucagon. FS 10 upon arrival, D50 given and will monitor and repeat FS. Rectal temp 91.2, placed on bear hugger. Skin tear to l scrotum.

## 2023-08-24 NOTE — H&P ADULT - HISTORY OF PRESENT ILLNESS
62 year old male PMHx Alcohol Abuse, Chronic Pancreatitis, HCV (s/p Tx with SVR), COPD, Esophageal Stricture (s/p Stenting 4/22 with subsequest removal 12/16/2022), DVT ( on Eliquis), ESRD ( on HD), Prior Lung Abscess (Pseudomonas / Morgenella), HTN, Chronic Systolic CHD, Chronic Pain ( narc Dependant) 62 year old male PMHx Alcohol Abuse, Chronic Pancreatitis, HCV (s/p Tx with SVR), COPD, Esophageal Stricture (s/p Stenting 4/22 with subsequest removal 12/16/2022), DVT ( on Eliquis), ESRD ( on HD), Prior Lung Abscess (Pseudomonas / Morgenella), HTN, Chronic Systolic CHD, Chronic Pain ( Narc Dependant), 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  Admitted 8/24/2023 after being found unresponsive at home.  Suspected Narcotic OD.  Intubated emergently in ED for Obtundation.  Noted to be profoundly Hypoglycemic.  Patient unresponsive on Vent on exam and unable to assist with HPI or ROS.  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  Admitted 8/24/2023 after being found unresponsive at home.  Suspected Narcotic OD.  Intubated emergently in ED for Obtundation.  Noted to be profoundly Hypoglycemic.  Patient unresponsive on Vent on exam and unable to assist with HPI or ROS.

## 2023-08-25 NOTE — PROVIDER CONTACT NOTE (CRITICAL VALUE NOTIFICATION) - DATE AND TIME:
25-Aug-2023 13:08 25-Aug-2023 13:10 21-year-old male, history of gastric sleeve surgery, appendectomy, presents with right flank pain rating to the right testicle today.  No fever.  Exam shows alert patient in no distress, HEENT NCAT PERRL, neck supple, lungs clear, RR S1S2, abdomen soft NT +BS, right CVA tenderness, no CCE, external genitalia no testicular tenderness, positive cremasteric reflex.

## 2023-08-25 NOTE — PATIENT PROFILE ADULT - VISION (WITH CORRECTIVE LENSES IF THE PATIENT USUALLY WEARS THEM):
The patient is a 77y year old Male complaining of cough.
unable to assess/Severely impaired: cannot locate objects without hearing or touching them or patient nonresponsive.

## 2023-08-25 NOTE — PROGRESS NOTE ADULT - ASSESSMENT
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  Admitted for unresponsiveness, possibly opioid induced. Never lost pulses. Intubated.    Neuro: Intubated and sedated, change to fentanyl, RASS 0 to -1; wean sedation in AM for SBT / SAT trials. Likely extubate today.   CV: BP and HR within acceptable ranges; maintain map >65  Pulm: Intubated for AMS, found to have bilateral PNA on CT; will start abx; adjust vent as per ABG.   GI: holding feeds in preparation for extubation. LFTs elevated, likely in setting of shock liver; improving. Will obtain RUQ us as patient very tender.  Renal/Metabolic: ESRD on HD; Trend electrolytes. HD today.  ID: Zosyn for aspiration pna and bacteremia. repeat bcx ordered for AM.   Heme:HSQ for DVT PPx  Endo:Monitor FS; q2H. Likely low glycogen reserve given patient's appearance.   Dispo: Admit to ICU; full code.        I spent 55 minutes providing critical care for this patient, which includes coordinating care, patient interview, physical exam, data assessment and plan formulation. This excludes any time spent on procedures. 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  Admitted for unresponsiveness, possibly opioid induced. Never lost pulses. Intubated.    Neuro: Intubated and sedated. Precedex with fentanyl pushes, RASS 0 to -1; wean sedation in AM for SBT / SAT trials. Likely extubate today.   CV: BP and HR within acceptable ranges; maintain map >65  Pulm: Intubated for AMS, found to have bilateral PNA on CT; will start abx; adjust vent as per ABG. Duoneb q6hrs.  GI: holding feeds in preparation for extubation. LFTs elevated, likely in setting of shock liver; improving. Will obtain RUQ us as patient very tender.  Renal/Metabolic: ESRD on HD; Trend electrolytes. HD today.  ID: Zosyn for aspiration pna and bacteremia. repeat bcx ordered for AM.   Heme:HSQ for DVT PPx  Endo:Monitor FS; q2H. Likely low glycogen reserve given patient's appearance.   Dispo: Admit to ICU; full code.        I spent 55 minutes providing critical care for this patient, which includes coordinating care, patient interview, physical exam, data assessment and plan formulation. This excludes any time spent on procedures.

## 2023-08-25 NOTE — PROVIDER CONTACT NOTE (HYPOGLYCEMIA EVENT) - NS PROVIDER CONTACT CONTRIBUTING FACTORS OF EPISODE
Poor oral intake within the last 24 hours/Patient NPO greater than 8 hours/Previous finger stick less than 100 mg/dL
Poor oral intake within the last 24 hours/Patient NPO greater than 8 hours/Previous finger stick less than 100 mg/dL/Chronic kidney disease

## 2023-08-25 NOTE — PATIENT PROFILE ADULT - FALL HARM RISK - RISK INTERVENTIONS

## 2023-08-25 NOTE — PROVIDER CONTACT NOTE (HYPOGLYCEMIA EVENT) - NS PROVIDER CONTACT BACKGROUND-HYPO
Age: 62y    Gender: Male    POCT Blood Glucose:  63 mg/dL (08-25-23 @ 18:38)  77 mg/dL (08-25-23 @ 16:21)  76 mg/dL (08-25-23 @ 14:38)  115 mg/dL (08-25-23 @ 12:21)  173 mg/dL (08-25-23 @ 10:52)  61 mg/dL (08-25-23 @ 10:23)  87 mg/dL (08-25-23 @ 07:58)  88 mg/dL (08-25-23 @ 05:09)      eMAR:dextrose 50% Injectable   100 milliLiter(s) IV Push (08-24-23 @ 19:45)    dextrose 50% Injectable   50 milliLiter(s) IV Push (08-25-23 @ 10:28)    dextrose 50% Injectable   50 milliLiter(s) IV Push (08-25-23 @ 18:46)    dextrose 50% Injectable   50 milliLiter(s) IV Push (08-24-23 @ 19:55)    
Age: 62y    Gender: Male    POCT Blood Glucose:  115 mg/dL (08-25-23 @ 12:21)  173 mg/dL (08-25-23 @ 10:52)  61 mg/dL (08-25-23 @ 10:23)  87 mg/dL (08-25-23 @ 07:58)  88 mg/dL (08-25-23 @ 05:09)  76 mg/dL (08-25-23 @ 04:27)  121 mg/dL (08-25-23 @ 03:16)  194 mg/dL (08-25-23 @ 00:26)      eMAR:dextrose 50% Injectable   50 milliLiter(s) IV Push (08-24-23 @ 19:55)    dextrose 50% Injectable   100 milliLiter(s) IV Push (08-24-23 @ 19:45)    dextrose 50% Injectable   50 milliLiter(s) IV Push (08-25-23 @ 10:28)

## 2023-08-25 NOTE — PROGRESS NOTE ADULT - SUBJECTIVE AND OBJECTIVE BOX
Progress Note    IMELDA KEENAN 62y (1960) Male [id number]  08-24-23 (1d)      Chief Complaint: Found unresponsive    Subjective:  Patient began responding to command but not really answering questions.    Review of Systems: unable to obtain.    PAST MEDICAL & SURGICAL HISTORY:  ETOH abuse [F10.10]  sober x1 year approximately    Pancreatitis [K85.90]    Hepatitis C [B19.20]  treated    Gout [M10.9]    HTN (hypertension) [I10]    Chronic kidney disease (CKD) [N18.9]    H/O chronic pancreatitis [Z87.19]    Gout [M10.9]    Alcohol abuse [F10.10]    Wound of right foot [S91.301A]    ESRD on hemodialysis [N18.6]    DVT (deep venous thrombosis) [I82.409]    No significant past surgical history [617734684]    Gastric perforation [K25.5]    Gastric perforation [K25.5]      albuterol/ipratropium for Nebulization 3 milliLiter(s) Nebulizer every 6 hours  apixaban 5 milliGRAM(s) Enteral Tube two times a day  chlorhexidine 2% Cloths 1 Application(s) Topical <User Schedule>  dexMEDEtomidine Infusion 0.5 MICROgram(s)/kG/Hr IV Continuous <Continuous>  dextrose 5%. 1000 milliLiter(s) IV Continuous <Continuous>  fentaNYL    Injectable 100 MICROGram(s) IV Push every 4 hours PRN  pantoprazole  Injectable 40 milliGRAM(s) IV Push daily  piperacillin/tazobactam IVPB.. 3.375 Gram(s) IV Intermittent every 12 hours    Objective:  T(C): 36.2 (08-25-23 @ 15:00), Max: 37 (08-25-23 @ 07:00)  HR: 66 (08-25-23 @ 15:45) (63 - 109)  BP: 138/73 (08-25-23 @ 15:30) (66/33 - 160/100)  RR: 12 (08-25-23 @ 15:45) (9 - 32)  SpO2: 97% (08-25-23 @ 14:45) (96% - 100%)    Physical exam:  GENERAL: patient appears chronically ill, very cachectic.  HEAD:  Atraumatic, Normocephalic  EYES: EOMI, PERRLA, conjunctiva and sclera clear  NECK: Supple, No JVD  CHEST/LUNG: Clear to auscultation bilaterally; No wheeze  HEART: Regular rate and rhythm; No murmurs, rubs, or gallops  ABDOMEN: +ruq tenderness to palpation.  EXTREMITIES:  2+ Peripheral Pulses, No clubbing, cyanosis, or edema  NEUROLOGY: non-focal  SKIN: No rashes or lesions      08-24-23 @ 07:01  -  08-25-23 @ 07:00  --------------------------------------------------------  IN: 257 mL / OUT: 40 mL / NET: 217 mL    08-25-23 @ 07:01  -  08-25-23 @ 15:53  --------------------------------------------------------  IN: 779.3 mL / OUT: 10 mL / NET: 769.3 mL        CAPILLARY BLOOD GLUCOSE      (08-25 @ 12:41)                      9.6  4.87 )-----------( 65                 27.7    Neutrophils = 4.14 (85.0%)  Lymphocytes = 0.54 (11.0%)  Eosinophils = 0.05 (1.0%)  Basophils = 0.00 (0.0%)  Monocytes = 0.15 (3.0%)  Bands = --%    08-25    141  |  107  |  60<H>  ----------------------------<  107<H>  3.2<L>   |  26  |  3.92<H>    Ca    7.3<L>      25 Aug 2023 12:41  Phos  3.9     08-25  Mg     2.0     08-25    TPro  4.5<L>  /  Alb  1.8<L>  /  TBili  0.5  /  DBili  x   /  AST  438<H>  /  ALT  533<H>  /  AlkPhos  665<H>  08-25    ( 25 Aug 2023 12:41 )   PT: 16.3 sec;   INR: 1.37 ratio;       PTT:27.6 sec  CARDIAC MARKERS ( 24 Aug 2023 19:50 )  Trop x     /  U/L / CKMB x           RVP:(08-24 @ 19:50)  NotDetec        Arterial Blood Gas:  08-25 @ 05:03  7.47/35/183/26/96.9/2.1  ABG lactate: --  Arterial Blood Gas:  08-24 @ 20:40  7.26/57/531/26/98.5/-2.4  ABG lactate: --      Tox:   (08-24 @ 19:50)  Acetaminophen Level, Serum: 13 [10 - 30]  Barbiturates Measurement: --  Benzodiazepines: --  Ethanol, Whole Blood: --  Salicylate Level, Serum: --        Urinalysis Basic - ( 25 Aug 2023 12:41 )    Color: x / Appearance: x / SG: x / pH: x  Gluc: 107 mg/dL / Ketone: x  / Bili: x / Urobili: x   Blood: x / Protein: x / Nitrite: x   Leuk Esterase: x / RBC: x / WBC x   Sq Epi: x / Non Sq Epi: x / Bacteria: x        WBC Trend: 4.87<--, 4.39<--, 4.99<--    Hb Trend: 9.6<--, 11.2<--, 11.4<--      Culture - Blood (collected 08-24-23 @ 19:50)  Source: .Blood Blood-Peripheral  Gram Stain (prelim) (08-25-23 @ 12:58):    Growth in aerobic bottle: Gram Negative Rods  Preliminary Report (08-25-23 @ 12:58):    Growth in aerobic bottle: Gram Negative Rods    Direct identification is available within approximately 3-5    hours either by Blood Panel Multiplexed PCR or Direct    MALDI-TOF. Details: [email or web address]  Organism: Blood Culture PCR (08-25-23 @ 14:35)  Organism: Blood Culture PCR (08-25-23 @ 14:35)      Method Type: PCR      -  Klebsiella oxytoca: Detec        New imaging in last 24 hours:  Consult notes reviewed:

## 2023-08-25 NOTE — CONSULT NOTE ADULT - SUBJECTIVE AND OBJECTIVE BOX
Patient chart reviewed, full consult to follow.   ESRD started HD 1/28/23;   Known to me since 2019.    Thank you for the courtesy of this consultation. [facility] NEPHROLOGY SERVICES, [facility]  NEPHROLOGY AND HYPERTENSION  [address]  [address]  [place] [address]  [phone]    MD HALIE OLIVIA MD YELENA ROSENBERG, MD BINNY KOSHY, MD CHRISTOPHER CAPUTO, MD EDWARD BOVER, MD      Information from chart:  "Patient is a 62y old  Male who presents with a chief complaint of Found unresponsive (25 Aug 2023 10:42)    HPI:  62 year old male PMHx Alcohol Abuse, Chronic Pancreatitis, HCV (s/p Tx with SVR), COPD, Esophageal Stricture (s/p Stenting  with subsequest removal 2022), DVT ( on Eliquis), ESRD ( on HD), Prior Lung Abscess (Pseudomonas / Morgenella), HTN, Chronic Systolic CHF, Chronic Pain ( Narc Dependant), Recent Hospitalization 2023 for Emphysematous Pancreatitis.  Admitted 2023 after being found unresponsive at home.  Suspected Narcotic OD.  Intubated emergently in ED for Obtundation.  Noted to be profoundly Hypoglycemic.  Patient unresponsive on Vent on exam and unable to assist with HPI or ROS.  (24 Aug 2023 23:29)   "    Patient in no distress  Intubated, sedated  Known to me since 2019; CKD advancing;   Complicated medical course, initiated HD 23 after 2 courses of temporary HD in  and ;  Presents with suspected narcotic overdose, muscle relaxant also on medication list      PAST MEDICAL & SURGICAL HISTORY:  ETOH abuse  sober x1 year approximately      Hepatitis C  treated      Gout      HTN (hypertension)      H/O chronic pancreatitis      Wound of right foot      ESRD on hemodialysis      DVT (deep venous thrombosis)      Gastric perforation        FAMILY HISTORY:  FH: HTN (hypertension)      Allergies    Tylenol (Other)    Intolerances      Home Medications:  aluminum hydroxide-magnesium hydroxide 200 mg-200 mg/5 mL oral suspension: 30 milliliter(s) orally every 4 hours As needed Dyspepsia (2023 15:29)  apixaban 5 mg oral tablet: 1 tab(s) orally 2 times a day (2023 05:30)  bisacodyl 5 mg oral delayed release tablet: 1 tab(s) orally every 12 hours, As needed, Constipation (2023 05:30)  cadexomer iodine 0.9% topical gel: 1 application topically once a day (2023 05:30)  calcitriol 0.5 mcg oral capsule: 1 cap(s) orally once a day (2023 05:30)  folic acid 1 mg oral tablet: 1 tab(s) orally once a day (2023 05:30)  lidocaine 4% topical film: Apply topically to affected area once a day (2023 05:30)  metoclopramide 5 mg oral tablet: 1 tab(s) orally 3 times a day (2023 05:30)  Multiple Vitamins oral tablet: 1 tab(s) orally once a day (2023 05:30)  NIFEdipine 30 mg oral tablet, extended release: 1 tab(s) orally once a day (2023 05:30)  oxyCODONE 10 mg oral tablet: 1 tab(s) orally every 4 hours, As needed, Severe Pain (7 - 10) (2023 05:30)  pancrelipase 6000 units-19,000 units-30,000 units oral delayed release capsule: 1 cap(s) orally 3 times a day (with meals) (2023 05:30)  pantoprazole 40 mg oral delayed release tablet: 1 tab(s) orally every 12 hours (2023 05:30)  polyethylene glycol 3350 oral powder for reconstitution: 17 gram(s) orally 2 times a day (2023 05:30)  senna leaf extract oral tablet: 2 tab(s) orally once a day (at bedtime) (2023 05:30)  sucralfate 1 g oral tablet: 1 tab(s) orally every 6 hours (2023 05:30)  thiamine 100 mg oral tablet: 1 tab(s) orally once a day (2023 05:30)  tiZANidine 2 mg oral tablet: 1 tab(s) orally every 8 hours, As needed, muscle cramps (2023 05:30)    MEDICATIONS  (STANDING):  albuterol/ipratropium for Nebulization 3 milliLiter(s) Nebulizer every 6 hours  apixaban 5 milliGRAM(s) Enteral Tube two times a day  chlorhexidine 2% Cloths 1 Application(s) Topical <User Schedule>  dexMEDEtomidine Infusion 0.5 MICROgram(s)/kG/Hr (8.22 mL/Hr) IV Continuous <Continuous>  dextrose 5% + lactated ringers. 1000 milliLiter(s) (50 mL/Hr) IV Continuous <Continuous>  pantoprazole  Injectable 40 milliGRAM(s) IV Push daily  piperacillin/tazobactam IVPB.. 3.375 Gram(s) IV Intermittent every 12 hours  potassium chloride  10 mEq/100 mL IVPB 10 milliEquivalent(s) IV Intermittent every 1 hour    MEDICATIONS  (PRN):  fentaNYL    Injectable 100 MICROGram(s) IV Push every 4 hours PRN Moderate Pain (4 - 6)    Vital Signs Last 24 Hrs  T(C): 37 (25 Aug 2023 07:00), Max: 37 (25 Aug 2023 07:00)  T(F): 98.6 (25 Aug 2023 07:00), Max: 98.6 (25 Aug 2023 07:00)  HR: 64 (25 Aug 2023 11:12) (64 - 109)  BP: 149/76 (25 Aug 2023 11:00) (66/33 - 160/100)  BP(mean): 98 (25 Aug 2023 11:00) (43 - 121)  RR: 28 (25 Aug 2023 11:12) (9 - 32)  SpO2: 100% (25 Aug 2023 11:12) (96% - 100%)    Parameters below as of 25 Aug 2023 08:00  Patient On (Oxygen Delivery Method): ventilator      Mode: AC/ CMV (Assist Control/ Continuous Mandatory Ventilation)  RR (machine): 28  TV (machine): 350  FiO2: 30  PEEP: 5  ITime: 1  MAP: 8  PIP: 13    Daily Height in cm: 185.42 (24 Aug 2023 19:40)    Daily Weight in k.4 (25 Aug 2023 04:00)    23 @ 07:  -  23 @ 07:00  --------------------------------------------------------  IN: 257 mL / OUT: 40 mL / NET: 217 mL    23 @ 07:01  -  23 @ 11:29  --------------------------------------------------------  IN: 171.2 mL / OUT: 5 mL / NET: 166.2 mL      CAPILLARY BLOOD GLUCOSE      POCT Blood Glucose.: 173 mg/dL (25 Aug 2023 10:52)  POCT Blood Glucose.: 61 mg/dL (25 Aug 2023 10:23)  POCT Blood Glucose.: 87 mg/dL (25 Aug 2023 07:58)  POCT Blood Glucose.: 88 mg/dL (25 Aug 2023 05:09)  POCT Blood Glucose.: 76 mg/dL (25 Aug 2023 04:27)  POCT Blood Glucose.: 121 mg/dL (25 Aug 2023 03:16)  POCT Blood Glucose.: 194 mg/dL (25 Aug 2023 00:26)  POCT Blood Glucose.: 187 mg/dL (24 Aug 2023 23:42)  POCT Blood Glucose.: 196 mg/dL (24 Aug 2023 21:54)  POCT Blood Glucose.: 246 mg/dL (24 Aug 2023 20:27)  POCT Blood Glucose.: 219 mg/dL (24 Aug 2023 20:05)  POCT Blood Glucose.: 26 mg/dL (24 Aug 2023 19:51)  POCT Blood Glucose.: <10 mg/dL (24 Aug 2023 19:47)  POCT Blood Glucose.: <10 mg/dL (24 Aug 2023 19:44)  POCT Blood Glucose.: 10 mg/dL (24 Aug 2023 19:43)    PHYSICAL EXAM:      T(C): 37 (23 @ 07:00), Max: 37 (23 @ 07:00)  HR: 64 (23 @ 11:12) (64 - 109)  BP: 149/76 (23 @ 11:00) (66/33 - 160/100)  RR: 28 (23 @ 11:12) (9 - 32)  SpO2: 100% (23 @ 11:12) (96% - 100%)  Wt(kg): --  Lungs clear  Heart S1S2  Abd soft NT ND  Extremities:   tr edema  left AVF + bruit and thrill                   136  |  102  |  50<H>  ----------------------------<  293<H>  3.0<L>   |  25  |  3.81<H>    Ca    7.3<L>      25 Aug 2023 02:00  Phos  3.8       Mg     2.0         TPro  5.3<L>  /  Alb  2.0<L>  /  TBili  0.5  /  DBili  x   /  AST  932<H>  /  ALT  718<H>  /  AlkPhos  863<H>                            11.2   4.39  )-----------( 80       ( 25 Aug 2023 02:00 )             32.9     Creatinine Trend: 3.81<--, 3.82<--  Urinalysis Basic - ( 25 Aug 2023 02:00 )    Color: Yellow / Appearance: Clear / S.010 / pH: x  Gluc: 293 mg/dL / Ketone: Negative  / Bili: Negative / Urobili: Negative mg/dL   Blood: x / Protein: 100 mg/dL / Nitrite: Negative   Leuk Esterase: Negative / RBC: 3-5 /HPF / WBC 0-2   Sq Epi: x / Non Sq Epi: x / Bacteria: Few      ABG - ( 25 Aug 2023 05:03 )  pH, Arterial: 7.47  pH, Blood: x     /  pCO2: 35    /  pO2: 183   / HCO3: 26    / Base Excess: 2.1   /  SaO2: 96.9                  Assessment   ESRD, suspected narcotic overdose. change in MS, intubated  Scheduled MWF    Plan  HD for today; UF as tolerated  Will assess for extra treatment tomorrow pending clinical course.  Discussed with critical care team  Consent for HD from mother Dede Peterson.    Austin Dukes MD            Thank you for the courtesy of this consultation.

## 2023-08-26 NOTE — DIETITIAN INITIAL EVALUATION ADULT - OTHER INFO
Pt seen in ICU, s/p extubation yesterday (7/25). Pt found by family unresponsive & hypoglycemic. S/P recent hospitalization. Last HD yesterday (7/25). Tolerating renal diet, consuming < 50%. Will provide 8 oz Nepro = 425 kcal & 19.1 g pro x 3 daily, double portions of All meals, sides and snacks. Pt cachectic w/ BMI = 14.1. Pt is Food Insecure and will need FAH resources prior to discharge.  Pt lives w/ his mom who is unable to cook or shop. Denies N/V/D/C/Chewing/Swallowing issues, No food allergies. Pt w/ B/L LE SCD boots.

## 2023-08-26 NOTE — PROGRESS NOTE ADULT - ASSESSMENT
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  Admitted for unresponsiveness, possibly opioid induced. Never lost pulses. Intubated.    Neuro: Baseline mental status. Oxycodone 5mg q6hrs prn for pain.  CV: BP and HR within acceptable ranges.  Pulm: Still hypoxic requiring nasal cannula. weaning. incentive spirometer. Possible aspiration PNA, treating with antibiotics.   GI: Diet started. LFts improving. Seen by dietician for malnutrition. Appreciate recommendations.   Renal/Metabolic: ESRD on HD; Trend electrolytes. Last HD 8/26.  ID: Zosyn for aspiration pna and bacteremia. repeat bcx in lab. klebsiella oxytoca/raoultella ornithinolytica, c/w likely aspiration. awaiting sensitivities.   Heme: HSQ for DVT PPx  Endo: Monitor FS; q2H. Likely low glycogen reserve given patient's appearance. Coming down on d10.  Dispo: Pt still relatively hypoglycemic so requiring q2hr finger sticks. Will downgrade once able to come off d10.        I spent 56 minutes providing medical care for this patient, which includes coordinating care, patient interview, physical exam, data assessment and plan formulation. This excludes any time spent on procedures.

## 2023-08-26 NOTE — DIETITIAN INITIAL EVALUATION ADULT - PERTINENT MEDS FT
MEDICATIONS  (STANDING):  apixaban 5 milliGRAM(s) Enteral Tube two times a day  chlorhexidine 2% Cloths 1 Application(s) Topical <User Schedule>  dextrose 10%. 1000 milliLiter(s) (30 mL/Hr) IV Continuous <Continuous>  pantoprazole  Injectable 40 milliGRAM(s) IV Push daily  piperacillin/tazobactam IVPB.. 3.375 Gram(s) IV Intermittent every 12 hours    MEDICATIONS  (PRN):  benzocaine/menthol Lozenge 1 Lozenge Oral four times a day PRN Sore Throat  oxyCODONE    IR 5 milliGRAM(s) Oral every 6 hours PRN Moderate Pain (4 - 6)

## 2023-08-26 NOTE — DIETITIAN INITIAL EVALUATION ADULT - DIET TYPE
8 oz Nepro = 425 kcal & 19.1 g pro x 3/double portions of All meals , sides and snacks/renal replacement pts:no protein restr,no conc K & phos, low sodium

## 2023-08-26 NOTE — DIETITIAN INITIAL EVALUATION ADULT - NS FNS DIET ORDER
Diet, Renal Restrictions:   For patients receiving Renal Replacement - No Protein Restr, No Conc K, No Conc Phos, Low Sodium  Supplement Feeding Modality:  Oral  Nepro Cans or Servings Per Day:  1       Frequency:  Two Times a day (08-26-23 @ 12:31)

## 2023-08-26 NOTE — PROGRESS NOTE ADULT - SUBJECTIVE AND OBJECTIVE BOX
Progress Note    IMELDA KEENAN 62y (1960) Male [id number]  08-24-23 (2d)      Chief Complaint: Found unresponsive    Subjective:  Patient seen and examined at bedside. Extubated last night. Reports that he has pain everywhere for which he takes oxycodone at home. Denies alcohol and opioid use otherwise.,    Review of Systems:  CONSTITUTIONAL: No fever, weight loss, or fatigue  EYES: No eye pain, visual disturbances, or discharge  ENMT:  +throat pain  NECK: No pain or stiffness  RESPIRATORY: No cough, wheezing, chills or hemoptysis; No shortness of breath  CARDIOVASCULAR: No chest pain, palpitations, dizziness, or leg swelling  GASTROINTESTINAL: No abdominal or epigastric pain. No nausea, vomiting, or hematemesis; No diarrhea or constipation. No melena or hematochezia.  NEUROLOGICAL: No headaches, SKIN: No itching, burning, rashes, or lesions   MUSCULOSKELETAL: +back pain and neck pain      PAST MEDICAL & SURGICAL HISTORY:  ETOH abuse [F10.10]  sober x1 year approximately    Pancreatitis [K85.90]    Hepatitis C [B19.20]  treated    Gout [M10.9]    HTN (hypertension) [I10]    Chronic kidney disease (CKD) [N18.9]    H/O chronic pancreatitis [Z87.19]    Gout [M10.9]    Alcohol abuse [F10.10]    Wound of right foot [S91.301A]    ESRD on hemodialysis [N18.6]    DVT (deep venous thrombosis) [I82.409]    No significant past surgical history [888430927]    Gastric perforation [K25.5]    Gastric perforation [K25.5]      apixaban 5 milliGRAM(s) Enteral Tube two times a day  benzocaine/menthol Lozenge 1 Lozenge Oral four times a day PRN  chlorhexidine 2% Cloths 1 Application(s) Topical <User Schedule>  dextrose 10%. 1000 milliLiter(s) IV Continuous <Continuous>  oxyCODONE    IR 5 milliGRAM(s) Oral every 6 hours PRN  pantoprazole  Injectable 40 milliGRAM(s) IV Push daily  piperacillin/tazobactam IVPB.. 3.375 Gram(s) IV Intermittent every 12 hours    Objective:  T(C): 35.1 (08-26-23 @ 12:00), Max: 36.3 (08-25-23 @ 18:34)  HR: 68 (08-26-23 @ 12:00) (59 - 94)  BP: 139/67 (08-26-23 @ 12:00) (77/65 - 165/79)  RR: 13 (08-26-23 @ 12:00) (6 - 28)  SpO2: 100% (08-26-23 @ 07:54) (97% - 100%)    Physical exam:  GENERAL: NAD, appears cachectic and malnourished.  HEAD:  Atraumatic, Normocephalic  EYES: EOMI, PERRLA, conjunctiva and sclera clear  NECK: Supple, No JVD  CHEST/LUNG: Clear to auscultation bilaterally; No wheeze  HEART: Regular rate and rhythm; No murmurs, rubs, or gallops  ABDOMEN: Soft, Nontender, Nondistended; Bowel sounds present  EXTREMITIES:  2+ Peripheral Pulses, No clubbing, cyanosis, or edema  PSYCH: AAOx3  NEUROLOGY: non-focal    08-25-23 @ 07:01  -  08-26-23 @ 07:00  --------------------------------------------------------  IN: 1866.6 mL / OUT: 1015 mL / NET: 851.6 mL    08-26-23 @ 07:01  -  08-26-23 @ 12:48  --------------------------------------------------------  IN: 75 mL / OUT: 0 mL / NET: 75 mL        CAPILLARY BLOOD GLUCOSE      (08-26 @ 03:43)                      10.5  8.19 )-----------( 62                 30.5    Neutrophils = 6.97 (85.2%)  Lymphocytes = 0.70 (8.5%)  Eosinophils = 0.00 (0.0%)  Basophils = 0.02 (0.2%)  Monocytes = 0.47 (5.7%)  Bands = --%    08-26    136  |  101  |  35<H>  ----------------------------<  133<H>  4.5   |  29  |  2.72<H>    Ca    7.1<L>      26 Aug 2023 03:43  Phos  3.7     08-26  Mg     2.0     08-26    TPro  4.7<L>  /  Alb  1.8<L>  /  TBili  0.5  /  DBili  x   /  AST  298<H>  /  ALT  486<H>  /  AlkPhos  628<H>  08-26    ( 25 Aug 2023 12:41 )   PT: 16.3 sec;   INR: 1.37 ratio;       PTT:27.6 sec  CARDIAC MARKERS ( 24 Aug 2023 19:50 )  Trop x     /  U/L / CKMB x           RVP:(08-24 @ 19:50)  NotDetec            Tox:   (08-24 @ 19:50)  Acetaminophen Level, Serum: 13 [10 - 30]  Barbiturates Measurement: --  Benzodiazepines: --  Ethanol, Whole Blood: --  Salicylate Level, Serum: --        Urinalysis Basic - ( 26 Aug 2023 03:43 )    Color: x / Appearance: x / SG: x / pH: x  Gluc: 133 mg/dL / Ketone: x  / Bili: x / Urobili: x   Blood: x / Protein: x / Nitrite: x   Leuk Esterase: x / RBC: x / WBC x   Sq Epi: x / Non Sq Epi: x / Bacteria: x        WBC Trend: 8.19<--, 4.87<--, 4.39<--    Hb Trend: 10.5<--, 9.6<--, 11.2<--, 11.4<--        New imaging in last 24 hours:  Consult notes reviewed:

## 2023-08-26 NOTE — DIETITIAN INITIAL EVALUATION ADULT - ETIOLOGY
Inadequate energy / protein intake ; Increased energy protein needs related to ESRD on HD ; chronic pancreatitis ; opiate abuse ; chronic systolic CHF ; COPD

## 2023-08-26 NOTE — PROGRESS NOTE ADULT - TIME BILLING
I spent 56 minutes providing medical care for this patient, which includes coordinating care, patient interview, physical exam, data assessment and plan formulation. This excludes any time spent on procedures.

## 2023-08-26 NOTE — PROGRESS NOTE ADULT - SUBJECTIVE AND OBJECTIVE BOX
NEPHROLOGY PROGRESS NOTE    CHIEF COMPLAINT:  ESRD    HPI:  No complaints    ROS:  denies sob    EXAM:  Vital Signs Last 24 Hrs  T(C): 35.1 (26 Aug 2023 12:00), Max: 36.3 (25 Aug 2023 18:34)  T(F): 95.2 (26 Aug 2023 12:00), Max: 97.3 (25 Aug 2023 18:34)  HR: 75 (26 Aug 2023 13:00) (59 - 94)  BP: 155/74 (26 Aug 2023 13:00) (77/65 - 165/79)  BP(mean): 94 (26 Aug 2023 13:00) (60 - 103)  RR: 14 (26 Aug 2023 13:00) (6 - 28)  SpO2: 100% (26 Aug 2023 13:00) (97% - 100%)    Parameters below as of 26 Aug 2023 06:02  Patient On (Oxygen Delivery Method): ventilator      I&O's Summary    25 Aug 2023 07:01  -  26 Aug 2023 07:00  --------------------------------------------------------  IN: 1866.6 mL / OUT: 1015 mL / NET: 851.6 mL    26 Aug 2023 07:01  -  26 Aug 2023 14:10  --------------------------------------------------------  IN: 75 mL / OUT: 0 mL / NET: 75 mL      Daily     Daily Weight in k.4 (25 Aug 2023 20:30)    Conversant, in no apparent distress  Normal respiratory effort, lungs clear bilaterally  Heart RRR with no murmur, no peripheral edema    LABS                        10.5   8.19  )-----------( 62       ( 26 Aug 2023 03:43 )             30.5     08-    136  |  101  |  35<H>  ----------------------------<  133<H>  4.5   |  29  |  2.72<H>    Ca    7.1<L>      26 Aug 2023 03:43  Phos  3.7       Mg     2.0         TPro  4.7<L>  /  Alb  1.8<L>  /  TBili  0.5  /  DBili  x   /  AST  298<H>  /  ALT  486<H>  /  AlkPhos  628<H>      Urinalysis Basic - ( 26 Aug 2023 03:43 )  Color: x / Appearance: x / SG: x / pH: x  Gluc: 133 mg/dL / Ketone: x  / Bili: x / Urobili: x   Blood: x / Protein: x / Nitrite: x   Leuk Esterase: x / RBC: x / WBC x   Sq Epi: x / Non Sq Epi: x / Bacteria: x    RADIOLOGY  CXR personally reviewed shows NAPD      Assessment   ESRD on hemodialysis MWF  Klebsiella bacteremia  Possible aspiration pneumonia    Plan  No need for dialysis today

## 2023-08-26 NOTE — DIETITIAN NUTRITION RISK NOTIFICATION - TREATMENT: THE FOLLOWING DIET HAS BEEN RECOMMENDED
Diet, Renal Restrictions:   For patients receiving Renal Replacement - No Protein Restr, No Conc K, No Conc Phos, Low Sodium  Supplement Feeding Modality:  Oral  Nepro Cans or Servings Per Day:  1       Frequency:  Three Times a day (08-26-23 @ 13:04) [Active]

## 2023-08-26 NOTE — DIETITIAN INITIAL EVALUATION ADULT - PERTINENT LABORATORY DATA
08-26    136  |  101  |  35<H>  ----------------------------<  133<H>  4.5   |  29  |  2.72<H>    Ca    7.1<L>      26 Aug 2023 03:43  Phos  3.7     08-26  Mg     2.0     08-26    TPro  4.7<L>  /  Alb  1.8<L>  /  TBili  0.5  /  DBili  x   /  AST  298<H>  /  ALT  486<H>  /  AlkPhos  628<H>  08-26  POCT Blood Glucose.: 144 mg/dL (08-26-23 @ 11:43)  A1C with Estimated Average Glucose Result: 5.7 % (09-01-22 @ 08:58)

## 2023-08-27 NOTE — CHART NOTE - NSCHARTNOTEFT_GEN_A_CORE
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  Admitted 8/24/2023 after being found unresponsive at home.  Suspected Narcotic OD.  Intubated emergently in ED for Obtundation.  Noted to be profoundly Hypoglycemic.  Patient unresponsive on Vent on exam and unable to assist with HPI or ROS. Pt was found to have kleb oxytoca bacteremia, likely from aspiration pna. Pt started on ceftriaxone. Pt extubated 8/25 to NC and was dialyzed. Pt now HD stable, satting well on NC, and stable for downgrade to regular medical floor     INTERVAL HPI/OVERNIGHT EVENTS: no acute events overnight       MEDICATIONS:  apixaban 5 milliGRAM(s) Enteral Tube two times a day  benzocaine/menthol Lozenge 1 Lozenge Oral four times a day PRN  cefTRIAXone   IVPB 2000 milliGRAM(s) IV Intermittent every 24 hours  chlorhexidine 2% Cloths 1 Application(s) Topical <User Schedule>  oxyCODONE    IR 5 milliGRAM(s) Oral every 6 hours PRN  pancrelipase  (CREON  6,000 Lipase Units) 1 Capsule(s) Oral three times a day with meals  pantoprazole    Tablet 40 milliGRAM(s) Oral before breakfast      T(C): 36.1 (08-27-23 @ 05:18), Max: 36.2 (08-27-23 @ 00:05)  HR: 83 (08-27-23 @ 09:00) (61 - 91)  BP: 142/78 (08-27-23 @ 09:00) (130/71 - 164/84)  RR: 15 (08-27-23 @ 09:00) (7 - 25)  SpO2: 100% (08-27-23 @ 09:00) (96% - 100%)  Wt(kg): --Vital Signs Last 24 Hrs  T(C): 36.1 (27 Aug 2023 05:18), Max: 36.2 (27 Aug 2023 00:05)  T(F): 96.9 (27 Aug 2023 05:18), Max: 97.1 (27 Aug 2023 00:05)  HR: 83 (27 Aug 2023 09:00) (61 - 91)  BP: 142/78 (27 Aug 2023 09:00) (130/71 - 164/84)  BP(mean): 97 (27 Aug 2023 09:00) (84 - 125)  RR: 15 (27 Aug 2023 09:00) (7 - 25)  SpO2: 100% (27 Aug 2023 09:00) (96% - 100%)    Parameters below as of 27 Aug 2023 00:05  Patient On (Oxygen Delivery Method): room air        PHYSICAL EXAM:  GENERAL: NAD, appears cachectic and malnourished.  HEAD:  Atraumatic, Normocephalic  EYES: EOMI, PERRLA, conjunctiva and sclera clear  NECK: Supple, No JVD  CHEST/LUNG: Clear to auscultation bilaterally; No wheeze  HEART: Regular rate and rhythm  ABDOMEN: Soft, Nontender, Nondistended  EXTREMITIES:  2+ Peripheral Pulses, No clubbing, cyanosis, or edema  PSYCH: AAOx3  NEUROLOGY: non-focal    Consultant(s) Notes Reviewed:  [x ] YES  [ ] NO  Care Discussed with Consultants/Other Providers [ x] YES  [ ] NO    LABS:                        9.4    7.21  )-----------( 56       ( 27 Aug 2023 06:20 )             26.9     08-27    140  |  105  |  58<H>  ----------------------------<  127<H>  3.0<L>   |  26  |  3.82<H>    Ca    7.1<L>      27 Aug 2023 06:20  Phos  3.3     08-27  Mg     1.8     08-27    TPro  4.7<L>  /  Alb  1.8<L>  /  TBili  0.4  /  DBili  x   /  AST  146<H>  /  ALT  384<H>  /  AlkPhos  527<H>  08-27    PT/INR - ( 25 Aug 2023 12:41 )   PT: 16.3 sec;   INR: 1.37 ratio         PTT - ( 25 Aug 2023 12:41 )  PTT:27.6 sec  Urinalysis Basic - ( 27 Aug 2023 06:20 )    Color: x / Appearance: x / SG: x / pH: x  Gluc: 127 mg/dL / Ketone: x  / Bili: x / Urobili: x   Blood: x / Protein: x / Nitrite: x   Leuk Esterase: x / RBC: x / WBC x   Sq Epi: x / Non Sq Epi: x / Bacteria: x      CAPILLARY BLOOD GLUCOSE      POCT Blood Glucose.: 200 mg/dL (27 Aug 2023 09:07)  POCT Blood Glucose.: 120 mg/dL (27 Aug 2023 05:15)  POCT Blood Glucose.: 192 mg/dL (26 Aug 2023 23:50)  POCT Blood Glucose.: 229 mg/dL (26 Aug 2023 17:32)  POCT Blood Glucose.: 208 mg/dL (26 Aug 2023 13:57)  POCT Blood Glucose.: 144 mg/dL (26 Aug 2023 11:43)  POCT Blood Glucose.: 76 mg/dL (26 Aug 2023 10:13)        Urinalysis Basic - ( 27 Aug 2023 06:20 )    Color: x / Appearance: x / SG: x / pH: x  Gluc: 127 mg/dL / Ketone: x  / Bili: x / Urobili: x   Blood: x / Protein: x / Nitrite: x   Leuk Esterase: x / RBC: x / WBC x   Sq Epi: x / Non Sq Epi: x / Bacteria: x        RADIOLOGY & ADDITIONAL TESTS: reviewed     Imaging Personally Reviewed:  [x ] YES  [ ] NO    62 year old male PMHx Alcohol Abuse, Chronic Pancreatitis, HCV (s/p Tx with SVR), COPD, Esophageal Stricture (s/p Stenting 4/22 with subsequest removal 12/16/2022), DVT ( on Eliquis), ESRD ( on HD), Prior Lung Abscess (Pseudomonas / Morgenella), HTN, Chronic Systolic CHF, Chronic Pain ( Narc Dependant), 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  intuabted for suspected opioid overdose with asp pna. Now extubated, satting wel on NC and stable for downgrade to regular medical floor     To follow up:  - satting well on NC. wean for sats> 92%. pulm to follow  - ESRD on HD. f/u renal recs for further HD  - cont creon for chronic pancreatitis  -cont ceftriaxone for kleb oxycota bacteremia from aspiration pna  - cont eliquis for parox afib. currently NSR  -transaminitis 2/2 transient shock now resolved? 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  Admitted 8/24/2023 after being found unresponsive at home.  Suspected Narcotic OD.  Intubated emergently in ED for Obtundation.  Noted to be profoundly Hypoglycemic.  Patient unresponsive on Vent on exam and unable to assist with HPI or ROS. Pt was found to have kleb oxytoca bacteremia, likely from aspiration pna. Pt started on ceftriaxone. Pt extubated 8/25 to NC and was dialyzed. Pt now HD stable, satting well on NC, and stable for downgrade to regular medical floor     INTERVAL HPI/OVERNIGHT EVENTS: no acute events overnight       MEDICATIONS:  apixaban 5 milliGRAM(s) Enteral Tube two times a day  benzocaine/menthol Lozenge 1 Lozenge Oral four times a day PRN  cefTRIAXone   IVPB 2000 milliGRAM(s) IV Intermittent every 24 hours  chlorhexidine 2% Cloths 1 Application(s) Topical <User Schedule>  oxyCODONE    IR 5 milliGRAM(s) Oral every 6 hours PRN  pancrelipase  (CREON  6,000 Lipase Units) 1 Capsule(s) Oral three times a day with meals  pantoprazole    Tablet 40 milliGRAM(s) Oral before breakfast      T(C): 36.1 (08-27-23 @ 05:18), Max: 36.2 (08-27-23 @ 00:05)  HR: 83 (08-27-23 @ 09:00) (61 - 91)  BP: 142/78 (08-27-23 @ 09:00) (130/71 - 164/84)  RR: 15 (08-27-23 @ 09:00) (7 - 25)  SpO2: 100% (08-27-23 @ 09:00) (96% - 100%)  Wt(kg): --Vital Signs Last 24 Hrs  T(C): 36.1 (27 Aug 2023 05:18), Max: 36.2 (27 Aug 2023 00:05)  T(F): 96.9 (27 Aug 2023 05:18), Max: 97.1 (27 Aug 2023 00:05)  HR: 83 (27 Aug 2023 09:00) (61 - 91)  BP: 142/78 (27 Aug 2023 09:00) (130/71 - 164/84)  BP(mean): 97 (27 Aug 2023 09:00) (84 - 125)  RR: 15 (27 Aug 2023 09:00) (7 - 25)  SpO2: 100% (27 Aug 2023 09:00) (96% - 100%)    Parameters below as of 27 Aug 2023 00:05  Patient On (Oxygen Delivery Method): room air        PHYSICAL EXAM:  GENERAL: NAD, appears cachectic and malnourished.  HEAD:  Atraumatic, Normocephalic  EYES: EOMI, PERRLA, conjunctiva and sclera clear  NECK: Supple, No JVD  CHEST/LUNG: Clear to auscultation bilaterally; No wheeze  HEART: Regular rate and rhythm  ABDOMEN: Soft, Nontender, Nondistended  EXTREMITIES:  2+ Peripheral Pulses, No clubbing, cyanosis, or edema  PSYCH: AAOx3  NEUROLOGY: non-focal    Consultant(s) Notes Reviewed:  [x ] YES  [ ] NO  Care Discussed with Consultants/Other Providers [ x] YES  [ ] NO    LABS:                        9.4    7.21  )-----------( 56       ( 27 Aug 2023 06:20 )             26.9     08-27    140  |  105  |  58<H>  ----------------------------<  127<H>  3.0<L>   |  26  |  3.82<H>    Ca    7.1<L>      27 Aug 2023 06:20  Phos  3.3     08-27  Mg     1.8     08-27    TPro  4.7<L>  /  Alb  1.8<L>  /  TBili  0.4  /  DBili  x   /  AST  146<H>  /  ALT  384<H>  /  AlkPhos  527<H>  08-27    PT/INR - ( 25 Aug 2023 12:41 )   PT: 16.3 sec;   INR: 1.37 ratio         PTT - ( 25 Aug 2023 12:41 )  PTT:27.6 sec  Urinalysis Basic - ( 27 Aug 2023 06:20 )    Color: x / Appearance: x / SG: x / pH: x  Gluc: 127 mg/dL / Ketone: x  / Bili: x / Urobili: x   Blood: x / Protein: x / Nitrite: x   Leuk Esterase: x / RBC: x / WBC x   Sq Epi: x / Non Sq Epi: x / Bacteria: x      CAPILLARY BLOOD GLUCOSE      POCT Blood Glucose.: 200 mg/dL (27 Aug 2023 09:07)  POCT Blood Glucose.: 120 mg/dL (27 Aug 2023 05:15)  POCT Blood Glucose.: 192 mg/dL (26 Aug 2023 23:50)  POCT Blood Glucose.: 229 mg/dL (26 Aug 2023 17:32)  POCT Blood Glucose.: 208 mg/dL (26 Aug 2023 13:57)  POCT Blood Glucose.: 144 mg/dL (26 Aug 2023 11:43)  POCT Blood Glucose.: 76 mg/dL (26 Aug 2023 10:13)        Urinalysis Basic - ( 27 Aug 2023 06:20 )    Color: x / Appearance: x / SG: x / pH: x  Gluc: 127 mg/dL / Ketone: x  / Bili: x / Urobili: x   Blood: x / Protein: x / Nitrite: x   Leuk Esterase: x / RBC: x / WBC x   Sq Epi: x / Non Sq Epi: x / Bacteria: x        RADIOLOGY & ADDITIONAL TESTS: reviewed     Imaging Personally Reviewed:  [x ] YES  [ ] NO    62 year old male PMHx Alcohol Abuse, Chronic Pancreatitis, HCV (s/p Tx with SVR), COPD, Esophageal Stricture (s/p Stenting 4/22 with subsequest removal 12/16/2022), DVT ( on Eliquis), ESRD ( on HD), Prior Lung Abscess (Pseudomonas / Morgenella), HTN, Chronic Systolic CHF, Chronic Pain ( Narc Dependant), 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  intuabted for suspected opioid overdose with asp pna. Now extubated, satting wel on NC and stable for downgrade to regular medical floor     To follow up:  - satting well on NC. wean for sats> 92%. pulm to follow  - ESRD on HD. f/u renal recs for further HD  - cont creon for chronic pancreatitis  -cont ceftriaxone for kleb oxycota bacteremia from aspiration pna  - cont eliquis for parox afib. currently NSR  -transaminitis 2/2 transient shock now resolved? f/u RUQ US     case and plan discussed with Dr Wilcox and dr [email or web address]

## 2023-08-27 NOTE — PROGRESS NOTE ADULT - ASSESSMENT
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  Admitted for unresponsiveness, possibly opioid induced. Never lost pulses. Intubated.    Neuro: Baseline mental status. Oxycodone 5mg q6hrs prn for pain.  CV: BP and HR within acceptable ranges.  Pulm: On room air. incentive spirometer. Likely aspiration PNA, treating with antibiotics.   GI: Diet started. LFts improving. Seen by dietician for malnutrition. Appreciate recommendations. Awaiting RUQ doppler read.  Renal/Metabolic: ESRD on HD; Trend electrolytes. Last HD 8/26.  ID: klebsiella oxytoca/raoultella ornithinolytica, sensitive to ceftriaxone; switched, c/w likely aspiration.   Heme: HSQ for DVT PPx  Endo: Off d10 drip. FS with meals.  Dispo: Patient bedboarded.        I spent 50 minutes providing medical care for this patient, which includes coordinating care, patient interview, physical exam, data assessment and plan formulation. This excludes any time spent on procedures. 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  Admitted for unresponsiveness, possibly opioid induced. Never lost pulses. Intubated.    Neuro: Baseline mental status. Oxycodone 5mg q6hrs prn for pain.  CV: BP and HR within acceptable ranges.  Pulm: On room air. incentive spirometer. Likely aspiration PNA, treating with antibiotics.   GI: Diet started. LFts improving. Seen by dietician for malnutrition. Appreciate recommendations. Awaiting RUQ doppler read. Added creon due to his chronic pancreatitis.  Renal/Metabolic: ESRD on HD; Trend electrolytes. Last HD 8/26.  ID: klebsiella oxytoca/raoultella ornithinolytica, sensitive to ceftriaxone; switched, c/w likely aspiration.   Heme: HSQ for DVT PPx  Endo: Off d10 drip. FS with meals.  Dispo: Patient bedboarded.        I spent 50 minutes providing medical care for this patient, which includes coordinating care, patient interview, physical exam, data assessment and plan formulation. This excludes any time spent on procedures.

## 2023-08-27 NOTE — PROGRESS NOTE ADULT - SUBJECTIVE AND OBJECTIVE BOX
Progress Note    IMELDA KEENAN 62y (1960) Male [id number]  08-24-23 (3d)      Chief Complaint: Found unresponsive    Subjective:  No acute events overnight. Patient seen and examined at bedside. Requesting high salt diet and carbohydrates.    Review of Systems:  CONSTITUTIONAL: No fever  EYES: No eye pain, visual disturbances, or discharge  ENMT:  No difficulty hearing, tinnitus, vertigo; No sinus or throat pain  NECK: No pain or stiffness  RESPIRATORY: No cough, wheezing, chills or hemoptysis; No shortness of breath  CARDIOVASCULAR: No chest pain, palpitations, dizziness, or leg swelling  GASTROINTESTINAL: No abdominal or epigastric pain. No nausea, vomiting, or hematemesis; No diarrhea or constipation. No melena or hematochezia.  NUROLOGICAL: No headaches  MUSCULOSKELETAL: No joint pain or swelling; No muscle, back, or extremity pain      PAST MEDICAL & SURGICAL HISTORY:  ETOH abuse [F10.10]  sober x1 year approximately    Pancreatitis [K85.90]    Hepatitis C [B19.20]  treated    Gout [M10.9]    HTN (hypertension) [I10]    Chronic kidney disease (CKD) [N18.9]    H/O chronic pancreatitis [Z87.19]    Gout [M10.9]    Alcohol abuse [F10.10]    Wound of right foot [S91.301A]    ESRD on hemodialysis [N18.6]    DVT (deep venous thrombosis) [I82.409]    No significant past surgical history [926707006]    Gastric perforation [K25.5]    Gastric perforation [K25.5]      apixaban 5 milliGRAM(s) Enteral Tube two times a day  benzocaine/menthol Lozenge 1 Lozenge Oral four times a day PRN  cefTRIAXone   IVPB 2000 milliGRAM(s) IV Intermittent every 24 hours  chlorhexidine 2% Cloths 1 Application(s) Topical <User Schedule>  oxyCODONE    IR 5 milliGRAM(s) Oral every 6 hours PRN  pancrelipase  (CREON  6,000 Lipase Units) 1 Capsule(s) Oral three times a day with meals  pantoprazole    Tablet 40 milliGRAM(s) Oral before breakfast    Objective:  T(C): 35.6 (08-27-23 @ 09:00), Max: 36.2 (08-27-23 @ 00:05)  HR: 83 (08-27-23 @ 09:00) (61 - 91)  BP: 142/78 (08-27-23 @ 09:00) (130/71 - 164/84)  RR: 15 (08-27-23 @ 09:00) (7 - 25)  SpO2: 100% (08-27-23 @ 09:00) (96% - 100%)    Physical exam:  GENERAL: NAD, cachectic   HEENT: Atraumatic, Normocephalic  EYES: EOMI, PERRLA, conjunctiva and sclera clear  NECK: Supple, No JVD  CHEST/LUNG: Clear to auscultation bilaterally; No wheeze  HEART: Regular rate. No murmurs, rubs, or gallops  ABDOMEN: Soft, very thin, Nontender, Nondistended;   EXTREMITIES:  2+ Peripheral Pulses,  PSYCH: AAOx3  NEUROLOGY: non-focal  SKIN: No rashes or lesions      08-26-23 @ 07:01  -  08-27-23 @ 07:00  --------------------------------------------------------  IN: 375 mL / OUT: 0 mL / NET: 375 mL        CAPILLARY BLOOD GLUCOSE      (08-27 @ 06:20)                      9.4  7.21 )-----------( 56                 26.9    Neutrophils = -- (--%)  Lymphocytes = -- (--%)  Eosinophils = -- (--%)  Basophils = -- (--%)  Monocytes = -- (--%)  Bands = --%    08-27    140  |  105  |  58<H>  ----------------------------<  127<H>  3.0<L>   |  26  |  3.82<H>    Ca    7.1<L>      27 Aug 2023 06:20  Phos  3.3     08-27  Mg     1.8     08-27    TPro  4.7<L>  /  Alb  1.8<L>  /  TBili  0.4  /  DBili  x   /  AST  146<H>  /  ALT  384<H>  /  AlkPhos  527<H>  08-27    ( 25 Aug 2023 12:41 )   PT: 16.3 sec;   INR: 1.37 ratio;       PTT:27.6 sec      RVP:(08-24 @ 19:50)  NotDetec            Tox:   (08-24 @ 19:50)  Acetaminophen Level, Serum: 13 [10 - 30]  Barbiturates Measurement: --  Benzodiazepines: --  Ethanol, Whole Blood: --  Salicylate Level, Serum: --        Urinalysis Basic - ( 27 Aug 2023 06:20 )    Color: x / Appearance: x / SG: x / pH: x  Gluc: 127 mg/dL / Ketone: x  / Bili: x / Urobili: x   Blood: x / Protein: x / Nitrite: x   Leuk Esterase: x / RBC: x / WBC x   Sq Epi: x / Non Sq Epi: x / Bacteria: x        WBC Trend: 7.21<--, 8.19<--, 4.87<--    Hb Trend: 9.4<--, 10.5<--, 9.6<--, 11.2<--, 11.4<--        New imaging in last 24 hours:  Consult notes reviewed:

## 2023-08-27 NOTE — PROGRESS NOTE ADULT - SUBJECTIVE AND OBJECTIVE BOX
NEPHROLOGY PROGRESS NOTE    CHIEF COMPLAINT:  ESRD    HPI:  No complaints    EXAM:  Vital Signs Last 24 Hrs  T(C): 35.6 (27 Aug 2023 09:00), Max: 36.2 (27 Aug 2023 00:05)  T(F): 96.1 (27 Aug 2023 09:00), Max: 97.1 (27 Aug 2023 00:05)  HR: 83 (27 Aug 2023 09:00) (67 - 91)  BP: 142/78 (27 Aug 2023 09:00) (130/71 - 164/84)  BP(mean): 97 (27 Aug 2023 09:00) (84 - 125)  RR: 15 (27 Aug 2023 09:00) (9 - 25)  SpO2: 100% (27 Aug 2023 09:00) (96% - 100%)    Parameters below as of 27 Aug 2023 00:05  Patient On (Oxygen Delivery Method): room air      I&O's Summary    26 Aug 2023 07:01  -  27 Aug 2023 07:00  --------------------------------------------------------  IN: 375 mL / OUT: 0 mL / NET: 375 mL    Conversant, in no apparent distress  Normal respiratory effort, lungs clear bilaterally  Heart RRR with no murmur, no peripheral edema    LABS                        9.4    7.21  )-----------( 56       ( 27 Aug 2023 06:20 )             26.9     08-27    140  |  105  |  58<H>  ----------------------------<  127<H>  3.0<L>   |  26  |  3.82<H>    Ca    7.1<L>      27 Aug 2023 06:20  Phos  3.3     08-27  Mg     1.8     08-27    TPro  4.7<L>  /  Alb  1.8<L>  /  TBili  0.4  /  DBili  x   /  AST  146<H>  /  ALT  384<H>  /  AlkPhos  527<H>  08-27    Urinalysis Basic - ( 27 Aug 2023 06:20 )    Color: x / Appearance: x / SG: x / pH: x  Gluc: 127 mg/dL / Ketone: x  / Bili: x / Urobili: x   Blood: x / Protein: x / Nitrite: x   Leuk Esterase: x / RBC: x / WBC x   Sq Epi: x / Non Sq Epi: x / Bacteria: x      Assessment   ESRD on hemodialysis MWF  Klebsiella bacteremia  Possible aspiration pneumonia    Plan  Resume dialysis Monday

## 2023-08-28 NOTE — CONSULT NOTE ADULT - SUBJECTIVE AND OBJECTIVE BOX
[facility]  INFECTIOUS DISEASES   [address] [place] [address]  Tel: [phone]     Fax: [phone]  ======================================================  Talbot MD Jonathan Garellek, DO Usha Mckenzie, JASON   ======================================================    IMELDA KEENAN  MRN-[id number]  Male  62y ([birth year])        Patient is a 62y old  Male who presents with a chief complaint of Found unresponsive (28 Aug 2023 13:35)      HPI:  62 year old male PMHx Alcohol Abuse, Chronic Pancreatitis, HCV (s/p Tx with SVR), COPD, Esophageal Stricture (s/p Stenting 4/22 with subsequest removal 12/16/2022), DVT ( on Eliquis), ESRD ( on HD), Prior Lung Abscess (Pseudomonas / Morgenella), HTN, Chronic Systolic CHF, Chronic Pain ( Narc Dependant), 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  Admitted 8/24/2023 after being found unresponsive at home.  Suspected Narcotic OD. He was intubated emergently in ED for obtundation.  Noted to be profoundly Hypoglycemic. Pt was found to have kleb oxytoca bacteremia, likely from aspiration pna. Pt started on ceftriaxone. Pt extubated 8/25 to NC and was dialyzed. ID consulted for workup and antibiotic management     PAST MEDICAL & SURGICAL HISTORY:  ETOH abuse  sober x1 year approximately      Hepatitis C  treated      Gout      HTN (hypertension)      H/O chronic pancreatitis      Wound of right foot      ESRD on hemodialysis      DVT (deep venous thrombosis)      Gastric perforation          Allergies  Tylenol (Other)        ANTIMICROBIALS:  cefTRIAXone   IVPB 2000 every 24 hours      MEDICATIONS  (STANDING):  cefTRIAXone   IVPB   100 mL/Hr IV Intermittent (08-28-23 @ 08:20)   100 mL/Hr IV Intermittent (08-27-23 @ 08:36)    piperacillin/tazobactam IVPB..   25 mL/Hr IV Intermittent (08-27-23 @ 05:53)   25 mL/Hr IV Intermittent (08-26-23 @ 17:29)   25 mL/Hr IV Intermittent (08-26-23 @ 06:01)   25 mL/Hr IV Intermittent (08-25-23 @ 21:13)   25 mL/Hr IV Intermittent (08-25-23 @ 06:06)    piperacillin/tazobactam IVPB...   200 mL/Hr IV Intermittent (08-24-23 @ 22:20)        OTHER MEDS: MEDICATIONS  (STANDING):  apixaban 5 two times a day  oxyCODONE    IR 5 every 6 hours PRN  pancrelipase  (CREON  6,000 Lipase Units) 1 three times a day with meals  pantoprazole    Tablet 40 before breakfast      SOCIAL HISTORY:     Smoking Cigarettes [x ]Active [ ] Former [ ]Denies   ETOH [ ]denies [x ]Former [ ]Current Use denies   Drug Use [x ]Never [ ] Former [ ] Active     FAMILY HISTORY:  FH: HTN (hypertension)        REVIEW OF SYSTEMS  [  ] ROS unobtainable because:    [x  ] All other systems negative except as noted below:	    Constitutional:  [ ] fever [ ] chills  [ ] weight loss  [x ] weakness  Skin:  [ ] rash [ ] phlebitis	  Eyes: [ ] icterus [ ] pain  [ ] discharge	  ENMT: [ ] sore throat  [ ] thrush [ ] ulcers [ ] exudates  Respiratory: [ ] dyspnea [ ] hemoptysis [ ] cough [ ] sputum	  Cardiovascular:  [ ] chest pain [ ] palpitations [ ] edema	  Gastrointestinal:  [ ] nausea [ ] vomiting [x ] diarrhea [ ] constipation [ x] pain	  Genitourinary:  [ ] dysuria [ ] frequency [ ] hematuria [ ] discharge [x ] flank pain  [ ] incontinence  Musculoskeletal:  [ ] myalgias [ ] arthralgias [ ] arthritis  [x ] back pain  Neurological:  [ ] headache [ ] seizures  [ ] confusion/altered mental status  Psychiatric:  [ ] anxiety [ ] depression	  Hematology/Lymphatics:  [ ] lymphadenopathy  Endocrine:  [ ] adrenal [ ] thyroid  Allergic/Immunologic:	 [ ] transplant [ ] seasonal    Vital Signs Last 24 Hrs  T(F): 97.7 (08-28-23 @ 09:45), Max: 98.6 (08-25-23 @ 07:00)    Vital Signs Last 24 Hrs  HR: 86 (08-28-23 @ 09:45) (78 - 95)  BP: 144/52 (08-28-23 @ 09:45) (144/52 - 166/84)  RR: 18 (08-28-23 @ 09:45)  SpO2: 98% (08-28-23 @ 09:45) (96% - 100%)  Wt(kg): --    PHYSICAL EXAM:  Constitutional: chronically ill, cachectic male in NAD   HEAD/EYES: anicteric, no conjunctival injection  ENT:  supple, no thrush  Cardiovascular:   normal S1, S2, no murmur, no edema  Respiratory:  rales at the bases bilaterally, no wheezes, no rales  GI:  soft, mildly diffusely tender, normal bowel sounds  :  no chen, no CVA tenderness  Musculoskeletal:  no synovitis, decreased ROM of the lower extremities, wiggles toes , LUE fistula with palpable thrill   Neurologic: awake and alert, no focal findings  Skin:  no rash, no erythemax3,  no phlebitis  Heme/Onc: no cervical  lymphadenopathy   Psychiatric:  awake, alert, frustrated mood      WBC Count: 5.98 K/uL (08-28 @ 09:55)  WBC Count: 7.21 K/uL (08-27 @ 06:20)  WBC Count: 8.19 K/uL (08-26 @ 03:43)  WBC Count: 4.87 K/uL (08-25 @ 12:41)  WBC Count: 4.39 K/uL (08-25 @ 02:00)  WBC Count: 4.99 K/uL (08-24 @ 19:50)      Auto Neutrophil %: 85.1 % *H* (08-28-23 @ 09:55)  Auto Neutrophil #: 5.09 K/uL (08-28-23 @ 09:55)  Auto Neutrophil %: 85.2 % *H* (08-26-23 @ 03:43)  Auto Neutrophil #: 6.97 K/uL (08-26-23 @ 03:43)  Auto Neutrophil %: 85.0 % *H* (08-25-23 @ 12:41)  Auto Neutrophil #: 4.14 K/uL (08-25-23 @ 12:41)  Auto Neutrophil %: 81.6 % *H* (08-24-23 @ 19:50)  Auto Neutrophil #: 4.07 K/uL (08-24-23 @ 19:50)                            9.5    5.98  )-----------( 47       ( 28 Aug 2023 09:55 )             27.8       08-28    142  |  107  |  71<H>  ----------------------------<  123<H>  2.7<LL>   |  26  |  4.69<H>    Ca    7.2<L>      28 Aug 2023 09:55  Phos  3.1     08-28  Mg     2.1     08-28    TPro  4.7<L>  /  Alb  1.6<L>  /  TBili  0.3  /  DBili  x   /  AST  230<H>  /  ALT  446<H>  /  AlkPhos  478<H>  08-28      Creatinine Trend: 4.69<--, 3.82<--, 2.72<--, 3.92<--, 3.81<--, 3.82<--          Lipase: 12 U/L (08-25-23 @ 02:00)  Lipase: 10 U/L (08-24-23 @ 19:50)          MICROBIOLOGY:  .Blood Blood-Peripheral  08-26-23   No growth at 24 hours  --  --      Clean Catch Clean Catch (Midstream)  08-25-23   No growth  --  --      .Blood Blood-Peripheral  08-24-23   No growth at 72 Hours  --  Blood Culture PCR  Klebsiella oxytoca /Raoutella ornithinolytica        SARS-CoV-2: NotDetec (24 Aug 2023 19:50)    Rapid RVP Result: NotDetec (08-24 @ 19:50)      SARS-CoV-2: NotDetec (08-24-23 @ 19:50)  Rapid RVP Result: NotDetec (08-24-23 @ 19:50)        RADIOLOGY:  ACC: [id number] EXAM:  XR CHEST PORTABLE URGENT 1V   ORDERED BY: TAVO GILLIAM     ACC: [id number] EXAM:  XR CHEST PORTABLE URGENT 1V   ORDERED BY: EM CARRIZALES     PROCEDURE DATE:  08/24/2023          INTERPRETATION:  AP chest on August 24, 2023 at8:15 PM. Patient has   sepsis.    Heart size is within normal limits.    There is a small infiltrate developing right lower hilum compared to   January 28.    In addition there is a slight developing left infiltrate at left base   medially.    There is an irregular density in the right mid upper lung field showing   CAT scan of August 24 views suspicious mass somewhat posteriorly.    Large left subclavian line on the earlier study is no longer evident.    There is air distention of stomach and clips in the left upper quadrant   are noted.    Follow-up AP chest on August 25, 2023 at 7:00 AM.    Endotracheal tube inserted. Improved right base infiltrate.    NG tube also inserted with tip ends in the mid lower esophagus.    IMPRESSION: Developing lower lung infiltrates.    Suspicious mass density right upper lung field. Endotracheal tube   inserted. Nasogastric tube inserted the tip ends in the mid lower   esophagus.        HENRY ZAMORANO MD; Attending Radiologist  This document has been electronically signed. Aug 25 2023 11:10AM      I have personally reviewed the above imaging

## 2023-08-28 NOTE — CONSULT NOTE ADULT - ASSESSMENT
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  Admitted 8/24/2023 after being found unresponsive at home. Intubated in ED for obtundation/airway protection. U tox + opiates and benzo. Admitted to ICU on mechanical vent support. Weaned and extubated 8/25. Blood cx grew Klebsiella oxytoca/Raoultella ornithinolytica.    DX: acute respiratory failure with hypoxia requiring intubation, PNA/aspiration PNA, klebsiella oxytoca bacteremia, acute metabolic encephalopathy, cavitary lung lesion/lung abscess, ESRD on HD, chronic pain, malnutrition    - weaned and extubated to RA  - doing well on RA  - maintaining O2 sat > 90%  - PNA and bacteremia treated with zosyn, now de-escalated to ceftriaxone  - metabolic encephalopathy in setting of opiate use ?overdose vs underlying infection, now back to baseline mental status  - CT reviewed and with RUL cavitary lesion / lung abscess decreased in size  - appreciate nutrition consult  - maintenance HD per nephrology

## 2023-08-28 NOTE — PROGRESS NOTE ADULT - SUBJECTIVE AND OBJECTIVE BOX
CHIEF COMPLAINT: FU of acute toxic encephalopathy with opiod overdose, sepsis with acute respiratory failure with aspiration pneumonia and klebsiella bacteremia  Patient was seen in HD facility  reported pain all over the body and requested pain meds.   no fever  no n/v/d/cp/sob reported.        PHYSICAL EXAM:    GENERAL: Malnourished and on room air  CHEST/LUNG:  No wheezing, no crackles   HEART: Regular rate and rhythm; No murmurs  ABDOMEN: Soft, Nontender, Nondistended; Bowel sounds present  EXTREMITIES:  No clubbing, cyanosis, or edema  Psychiatry: AAO x 3, mood is appropriate       OBJECTIVE DATA:   Vital Signs Last 24 Hrs  T(C): 36.5 (28 Aug 2023 09:45), Max: 36.7 (27 Aug 2023 20:03)  T(F): 97.7 (28 Aug 2023 09:45), Max: 98 (27 Aug 2023 20:03)  HR: 86 (28 Aug 2023 09:45) (78 - 95)  BP: 144/52 (28 Aug 2023 09:45) (144/52 - 166/84)  BP(mean): 104 (27 Aug 2023 20:15) (103 - 106)  RR: 18 (28 Aug 2023 09:45) (11 - 19)  SpO2: 98% (28 Aug 2023 09:45) (96% - 100%)    Parameters below as of 28 Aug 2023 09:45  Patient On (Oxygen Delivery Method): room air               Daily     Daily Weight in k.8 (28 Aug 2023 09:45)  LABS:                        9.5    5.98  )-----------( 47       ( 28 Aug 2023 09:55 )             27.8                 142  |  107  |  71<H>  ----------------------------<  123<H>  2.7<LL>   |  26  |  4.69<H>    Ca    7.2<L>      28 Aug 2023 09:55  Phos  3.1       Mg     2.1         TPro  4.7<L>  /  Alb  1.6<L>  /  TBili  0.3  /  DBili  x   /  AST  230<H>  /  ALT  446<H>  /  AlkPhos  478<H>                  Urinalysis Basic - ( 28 Aug 2023 09:55 )    Color: x / Appearance: x / SG: x / pH: x  Gluc: 123 mg/dL / Ketone: x  / Bili: x / Urobili: x   Blood: x / Protein: x / Nitrite: x   Leuk Esterase: x / RBC: x / WBC x   Sq Epi: x / Non Sq Epi: x / Bacteria: x            CAPILLARY BLOOD GLUCOSE      POCT Blood Glucose.: 177 mg/dL (28 Aug 2023 07:58)      Culture - Blood  Source: .Blood Blood-Peripheral  Preliminary Report ():    No growth at 24 hours    Culture - Urine  Source: Clean Catch Clean Catch (Midstream)  Final Report ():    No growth    Culture - Blood  Source: .Blood Blood-Peripheral  Gram Stain ():    Growth in aerobic bottle: Gram Negative Rods  Final Report ():    Growth in aerobic bottle: Klebsiella oxytoca/Raoultella ornithinolytica    Direct identification is available within approximately 3-5    hours either by Blood Panel Multiplexed PCR or Direct    MALDI-TOF. Details: [email or web address]  Organism: Blood Culture PCR  Klebsiella oxytoca /Raoutella ornithinolytica ()  Organism: Klebsiella oxytoca /Raoutella ornithinolytica ()    Sensitivities:      Method Type: TABITHA      -  Amikacin: S <=16      -  Ampicillin: R >16 These ampicillin results predict results for amoxicillin      -  Ampicillin/Sulbactam: S 8/4      -  Aztreonam: S <=4      -  Cefazolin: R 8      -  Cefepime: SDD 4 The breakpoint for susceptible dose dependent may require the usage of a higher cefepime dose (equivalent to adult dose of 2g q8h for normal renal function) for successful treatment.      -  Cefoxitin: S <=8      -  Ceftriaxone: S <=1      -  Ciprofloxacin: S <=0.25      -  Ertapenem: S <=0.5      -  Gentamicin: S <=2      -  Imipenem: S <=1      -  Levofloxacin: S <=0.5      -  Meropenem: S <=1      -  Piperacillin/Tazobactam: S <=8      -  Tobramycin: S <=2      -  Trimethoprim/Sulfamethoxazole: S   Organism: Blood Culture PCR ()    Sensitivities:      Method Type: PCR      -  Klebsiella oxytoca: Detec    Culture - Blood  Source: .Blood Blood-Peripheral  Preliminary Report ():    No growth at 72 Hours      Interval Radiology studies: reviewed by me    < from: US Duplex Venous Upper Ext Ltd, Right (23 @ 11:30) >    IMPRESSION:  No evidence of right upper extremity deep venous thrombosis.    < end of copied text >      MEDICATIONS  (STANDING):  apixaban 5 milliGRAM(s) Enteral Tube two times a day  cefTRIAXone   IVPB 2000 milliGRAM(s) IV Intermittent every 24 hours  chlorhexidine 2% Cloths 1 Application(s) Topical <User Schedule>  pancrelipase  (CREON  6,000 Lipase Units) 1 Capsule(s) Oral three times a day with meals  pantoprazole    Tablet 40 milliGRAM(s) Oral before breakfast  potassium chloride   Powder 40 milliEquivalent(s) Oral once    MEDICATIONS  (PRN):  benzocaine/menthol Lozenge 1 Lozenge Oral four times a day PRN Sore Throat  oxyCODONE    IR 5 milliGRAM(s) Oral every 6 hours PRN Moderate Pain (4 - 6)

## 2023-08-28 NOTE — CONSULT NOTE ADULT - SUBJECTIVE AND OBJECTIVE BOX
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  Admitted 8/24/2023 after being found unresponsive at home.  Suspected Narcotic OD.  Intubated emergently in ED for Obtundation.  Noted to be profoundly Hypoglycemic.  Patient unresponsive on Vent on exam and unable to assist with HPI or ROS.  (24 Aug 2023 23:29)  ----------------------------  U tox + opiates and benzo  Blood cx grew Klebsiella oxytoca/Raoultella ornithinolytica  Treated with zosyn, de-escalated to ceftriaxone  Extubated 8/25  Downgraded to medical service 8/27        Allergies  Tylenol (Other)      MEDICATIONS  (STANDING):  apixaban 5 milliGRAM(s) Enteral Tube two times a day  cefTRIAXone   IVPB 2000 milliGRAM(s) IV Intermittent every 24 hours  chlorhexidine 2% Cloths 1 Application(s) Topical <User Schedule>  pancrelipase  (CREON  6,000 Lipase Units) 1 Capsule(s) Oral three times a day with meals  pantoprazole    Tablet 40 milliGRAM(s) Oral before breakfast    MEDICATIONS  (PRN):  benzocaine/menthol Lozenge 1 Lozenge Oral four times a day PRN Sore Throat  oxyCODONE    IR 5 milliGRAM(s) Oral every 6 hours PRN Moderate Pain (4 - 6)        Drug Dosing Weight  Height (cm): 185.4 (24 Aug 2023 19:40)  Weight (kg): 48.4 (25 Aug 2023 07:00)  BMI (kg/m2): 14.1 (25 Aug 2023 07:00)  BSA (m2): 1.65 (25 Aug 2023 07:00)    PAST MEDICAL & SURGICAL HISTORY:  ETOH abuse  sober x1 year approximately      Hepatitis C  treated      Gout      HTN (hypertension)      H/O chronic pancreatitis      Wound of right foot      ESRD on hemodialysis      DVT (deep venous thrombosis)      Gastric perforation          FAMILY HISTORY:  FH: HTN (hypertension)        SOCIAL HISTORY:  hx of ETOH abuse  on chronic opiates      REVIEW OF SYSTEMS:  CONSTITUTIONAL: No fever, + fatigue + generalized weakness  EYES: No eye pain, visual disturbances, or discharge  ENMT:  No difficulty hearing, tinnitus, vertigo; No sinus or throat pain  NECK: No pain or stiffness  RESPIRATORY: No cough, wheezing, hemoptysis; + shortness of breath (resolved)  CARDIOVASCULAR: No chest pain, palpitations  GASTROINTESTINAL: No abdominal or epigastric pain. No nausea, vomiting, No diarrhea GENITOURINARY: No dysuria, frequency, hematuria  NEUROLOGICAL: No headaches, memory loss, loss of strength, numbness, or tremors  SKIN: No itching, burning, rashes, or lesions   LYMPH NODES: No enlarged glands  ENDOCRINE: No heat or cold intolerance; No hair loss  MUSCULOSKELETAL: No joint pain or swelling; No muscle, back, or extremity pain  PSYCHIATRIC: No depression, anxiety  HEME/LYMPH: No easy bruising, or bleeding gums  ALLERGY AND IMMUNOLOGIC: No hives or eczema          Vital Signs Last 24 Hrs  T(C): 36.5 (08-28-23 @ 09:45), Max: 36.7 (08-27-23 @ 20:03)  HR: 86 (08-28-23 @ 09:45) (78 - 95)  BP: 144/52 (08-28-23 @ 09:45) (144/52 - 166/84)  RR: 18 (08-28-23 @ 09:45) (11 - 19)  SpO2: 98% (08-28-23 @ 09:45) (96% - 100%)    Patient On (Oxygen Delivery Method): room air        PHYSICAL EXAM:  GENERAL: thin cachectic male, NAD  HEAD:  Atraumatic, Normocephalic  EYES: EOMI, conjunctiva and sclera clear  ENMT: No tonsillar erythema, exudates, Moist mucous membranes, No lesions  NECK: Supple, No JVD  NERVOUS SYSTEM:  Alert & Oriented X3, Good concentration; Motor Strength 5/5 B/L upper and lower extremities  CHEST/LUNG: Clear to auscultation bilaterally; No rales, rhonchi, wheezing  HEART: Regular rate and rhythm  ABDOMEN: Soft, Nontender, Nondistended; Bowel sounds present  EXTREMITIES:  2+ Peripheral Pulses, No clubbing, cyanosis, or edema  LYMPH: No lymphadenopathy noted  SKIN: No rashes or lesions      LABS:                        9.5    5.98  )-----------( 47       ( 28 Aug 2023 09:55 )             27.8       08-28    140  |  104  |  30<H>  ----------------------------<  125<H>  3.0<L>   |  28  |  2.29<H>    Ca    7.3<L>      28 Aug 2023 15:15  Phos  3.1     08-28  Mg     2.1     08-28    TPro  4.7<L>  /  Alb  1.6<L>  /  TBili  0.3  /  DBili  x   /  AST  230<H>  /  ALT  446<H>  /  AlkPhos  478<H>  08-28      Opiate, Urine (08.25.23 @ 12:50)    Opiate, Urine: Positive    Benzodiazepine, Urine (08.25.23 @ 12:50)    Benzodiazepine, Urine: Positive    Culture - Blood in AM (08.26.23 @ 10:10)    Specimen Source: .Blood Blood-Peripheral   Culture Results: No growth at 48 Hours      Culture - Urine (08.25.23 @ 02:00)    Specimen Source: Clean Catch Clean Catch (Midstream)   Culture Results: No growth      Culture - Blood (08.24.23 @ 19:50)   Specimen Source: .Blood Blood-Peripheral   Organism: Blood Culture PCR   Organism: Klebsiella oxytoca /Raoutella ornithinolytica   Culture Results: Growth in aerobic bottle: Klebsiella oxytoca/Raoultella ornithinolytica        RADIOLOGY:  CXR < from: Xray Chest 1 View- PORTABLE-Routine (Xray Chest 1 View- PORTABLE-Routine in AM.) (08.26.23 @ 07:54) >  Multifocal ill-defined airspace opacities in the RIGHT   upper lobe and bilateral lower zones.    -----------------------  CT chest/abdomen/pelvis < from: CT Chest No Cont (08.24.23 @ 21:19) >  Bilateral pneumonia. Fluid layering in the large airways and distribution   of pneumonia suggests aspiration pneumonia. This is new compared to the   previous studies.    No other acute finding.    Interval decrease in size of cavitary lesion in the upper lobe of the   right lung, probably chronic or resolving cavitary pneumonia.    Sequela of chronic pancreatitis with biliary ductal dilation and a small   amount of air in adjacent to the pancreatic parenchyma. The amount of air   has decreased compared to 7/13/2023.    Renal atrophy.    Cachexia.

## 2023-08-28 NOTE — PROGRESS NOTE ADULT - ASSESSMENT
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  Admitted 8/24/2023 after being found unresponsive at home.  Suspected Narcotic OD.  Intubated emergently in ED for Obtundation.  Noted to be profoundly Hypoglycemic.  Patient unresponsive on Vent on exam and unable to assist with HPI or ROS. Pt was found to have kleb oxytoca bacteremia, likely from aspiration pna. Pt started on ceftriaxone. Pt extubated 8/25 to NC and was dialyzed. Pt now HD stable, satting well on NC, and stable for downgrade to regular medical floor on 8/27/23.    Acute toxic encephalopathy with opioid overdose. now improved.   Sepsis with aspiration pneumonia and klebsiella bacteremia and associated acute hypoxic respiratory failure s/p intubation and extubation in the ICU. sepsis has resolved. cont iv ceftriaxone. Consulted and discussed with ID. follow recs about iv antibiotic. follow labs.   Severe PCM. cont nepro supplement. nutrition service following  Hx of chronic pancreatitis. cont creon.   Hx of COPD. sable.   pA Fib. now sinus rhythm. cont eliquis. no active gross bleeding  AOCD. stable Hb.   ESRD. cont HD per renal  Hypokalemia. Replete and recheck level.      DVT ppx: eliquis  Full code.     Time spent reviewing the chart and managing the patient is 56mins.

## 2023-08-28 NOTE — PROGRESS NOTE ADULT - SUBJECTIVE AND OBJECTIVE BOX
[facility] NEPHROLOGY SERVICES, [facility]  NEPHROLOGY AND HYPERTENSION  [address]  [address]  [place] [address]  [phone]    MD HALIE OLIIVA MD YELENA ROSENBERG, MD BINNY KOSHY, MD CHRISTOPHER CAPUTO, MD EDWARD BOVER, MD          Patient events noted  No acute distress     MEDICATIONS  (STANDING):  apixaban 5 milliGRAM(s) Enteral Tube two times a day  cefTRIAXone   IVPB 2000 milliGRAM(s) IV Intermittent every 24 hours  chlorhexidine 2% Cloths 1 Application(s) Topical <User Schedule>  pancrelipase  (CREON  6,000 Lipase Units) 1 Capsule(s) Oral three times a day with meals  pantoprazole    Tablet 40 milliGRAM(s) Oral before breakfast  potassium chloride   Powder 40 milliEquivalent(s) Oral once    MEDICATIONS  (PRN):  benzocaine/menthol Lozenge 1 Lozenge Oral four times a day PRN Sore Throat  oxyCODONE    IR 5 milliGRAM(s) Oral every 6 hours PRN Moderate Pain (4 - 6)      08-27-23 @ 07:01  -  08-28-23 @ 07:00  --------------------------------------------------------  IN: 120 mL / OUT: 0 mL / NET: 120 mL      PHYSICAL EXAM:      T(C): 36.5 (08-28-23 @ 09:45), Max: 36.7 (08-27-23 @ 20:03)  HR: 86 (08-28-23 @ 09:45) (78 - 95)  BP: 144/52 (08-28-23 @ 09:45) (144/52 - 166/84)  RR: 18 (08-28-23 @ 09:45) (11 - 19)  SpO2: 98% (08-28-23 @ 09:45) (96% - 100%)  Wt(kg): --  Lungs clear  Heart S1S2  Abd soft mild diffuse tenderness   Extremities:   tr edema                                    9.5    5.98  )-----------( 47       ( 28 Aug 2023 09:55 )             27.8     08-28    142  |  107  |  71<H>  ----------------------------<  123<H>  2.7<LL>   |  26  |  4.69<H>    Ca    7.2<L>   corrected 9.2   28 Aug 2023 09:55  Phos  3.1     08-28  Mg     2.1     08-28    TPro  4.7<L>  /  Alb  1.6<L>  /  TBili  0.3  /  DBili  x   /  AST  230<H>  /  ALT  446<H>  /  AlkPhos  478<H>  08-28      LIVER FUNCTIONS - ( 28 Aug 2023 09:55 )  Alb: 1.6 g/dL / Pro: 4.7 gm/dL / ALK PHOS: 478 U/L / ALT: 446 U/L / AST: 230 U/L / GGT: x           Creatinine Trend: 4.69<--, 3.82<--, 2.72<--, 3.92<--, 3.81<--, 3.82<--      Assessment   ESRD, maintenance   Hypokalemia   MS change   Improved     Plan:  HD for today   UF 0-500 ml   Replete K po; on 4 k bath     Austin Dukes MD

## 2023-08-28 NOTE — CONSULT NOTE ADULT - ASSESSMENT
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  Admitted 8/24/2023 after being found unresponsive at home.  Suspected Narcotic OD. He was intubated emergently in ED for obtundation.  Noted to be profoundly Hypoglycemic. Pt was found to have kleb oxytoca bacteremia, likely from aspiration pna. Pt started on ceftriaxone. Pt extubated 8/25 to NC and was dialyzed.  Patient responding to ceftriaxone and although has SDD to cefepime is S to ceftriaxone and now afebrile, normal wbc, no rigors, and normal vitals. Would continue the ceftriaxone 2g for now and monitor.  He reports numerous loose stools but not watery. He is hypokalemic and although this may be multifactorial it could be from all the stool events he is having. please address and if this continuous to worsen will have to send Clostridioides difficile   His LFTs are are rising and his platelets sare trending downward -should involve gastroenterology     Klebsiella bacteremia  ESRD   Hypokalemia   Thrombocytopenia   Transaminitis     Plan:  continue ceftriaxone 2g q24hrs   monitor cultures   watch for fevers   hemodialysis per renal   GI consult  trend LFTs  trend platelets  consider hematology consult if platelets continue to fall   pain control     Discussed with Dr Quirino Gardiner, DO  Infectious Disease Attending  Reachable via Microsoft Teams or [place] office: [phone]  After 5pm/weekends please call [phone] for all inquiries and new consults

## 2023-08-29 NOTE — PHYSICAL THERAPY INITIAL EVALUATION ADULT - DIAGNOSIS, PT EVAL
Pt presents with difficutly in bed mobility, transfers and ambulation  due to generalized weakness , poor endurance, c/o aches and pains in the whole body , UE and LE.

## 2023-08-29 NOTE — PHYSICAL THERAPY INITIAL EVALUATION ADULT - GENERAL OBSERVATIONS, REHAB EVAL
Pt is awake, c/o generalized weakness, multiple aches and pains in the UE and LE joints and muscles.

## 2023-08-29 NOTE — PHYSICAL THERAPY INITIAL EVALUATION ADULT - LEVEL OF INDEPENDENCE: CHAIR TO BED, REHAB EVAL
attempted but unable due to generalized weakness and c/o body aches and pains in the UE and LE/unable to perform

## 2023-08-29 NOTE — PHYSICAL THERAPY INITIAL EVALUATION ADULT - LEVEL OF INDEPENDENCE: SUPINE/SIT, REHAB EVAL
attempted but unable due to generalized weakness and c/o body aches and pains in the UE and LE/moderate assist (50% patients effort)/unable to perform

## 2023-08-29 NOTE — PROGRESS NOTE ADULT - SUBJECTIVE AND OBJECTIVE BOX
CHIEF COMPLAINT:  ===============  unresponsive, respiratory failure     INTERVAL EVENTS:  ==================    - remains afebrile  - T max 99.3  -says he feels better today / his breathing is ok   -No hiccups today       REVIEW OF SYSTEMS:  ==================  - no fever, no chills  - no HA, no dizziness  - no visual changes, no auditory changes  - no sore throat, no sinus congestion  - no SOB, no cough  - no chest pain, no palpitations  - no abdominal pain, no N/V/D  - no dysuria, no hematuria  - no myalgias, no arthralgias  - no pain in extremity, no swelling   - no rashes, no pruritis  - no neuro deficits  - no psychiatric concerns       HPI:  62 year old male PMHx Alcohol Abuse, Chronic Pancreatitis, HCV (s/p Tx with SVR), COPD, Esophageal Stricture (s/p Stenting 4/22 with subsequest removal 12/16/2022), DVT ( on Eliquis), ESRD ( on HD), Prior Lung Abscess (Pseudomonas / Morgenella), HTN, Chronic Systolic CHF, Chronic Pain ( Narc Dependant), 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  Admitted 8/24/2023 after being found unresponsive at home.  Suspected Narcotic OD.  Intubated emergently in ED for Obtundation.  Noted to be profoundly Hypoglycemic.  Patient unresponsive on Vent on exam and unable to assist with HPI or ROS. Pt was found to have kleb oxytoca bacteremia, likely from aspiration pna. Pt started on ceftriaxone. Pt extubated 8/25 to NC and was dialyzed. Pt now HD stable, satting well on NC, and stable for downgrade to regular medical floor     OBJECTIVE:  ===========    ICU Vital Signs Last 24 Hrs  T(C): 36.7 (29 Aug 2023 12:06), Max: 37.4 (29 Aug 2023 06:20)  T(F): 98.1 (29 Aug 2023 12:06), Max: 99.3 (29 Aug 2023 06:20)  HR: 92 (29 Aug 2023 12:06) (92 - 102)  BP: 145/78 (29 Aug 2023 12:06) (124/81 - 163/81)  RR: 18 (29 Aug 2023 12:06) (18 - 18)  SpO2: 97% (29 Aug 2023 12:06) (97% - 99%)    O2 Parameters below as of 29 Aug 2023 12:06  Patient On (Oxygen Delivery Method): room air    08-28 @ 07:01  -  08-29 @ 07:00  --------------------------------------------------------  IN: 480 mL / OUT: 0 mL / NET: 480 mL      CAPILLARY BLOOD GLUCOSE  POCT Blood Glucose.: 89 mg/dL (29 Aug 2023 16:37)      PHYSICAL EXAM:  ==============  General: awake , alert no complaints   Respiratory: CTA B/L no wheezing, rales  Cardiovascular: S1S2 RRR NO M/R/  Abdomen: Soft, + BS NO Hiccups   Extremities: Thin lower extremities, RUE +2 edema, Lupper extremity w AVG +/+  Skin: Intact   Neurological: No deficits appreciated   Psychiatry: appropriate     HOSPITAL MEDICATIONS:  ======================  apixaban 5 milliGRAM(s) Enteral Tube two times a day    cefTRIAXone   IVPB 2000 milliGRAM(s) IV Intermittent every 24 hours  oxyCODONE    IR 5 milliGRAM(s) Oral every 6 hours PRN  pancrelipase  (CREON  6,000 Lipase Units) 1 Capsule(s) Oral three times a day with meals  pantoprazole    Tablet 40 milliGRAM(s) Oral before breakfast  benzocaine/menthol Lozenge 1 Lozenge Oral four times a day PRN  chlorhexidine 2% Cloths 1 Application(s) Topical <User Schedule>        LABS:  =====                        9.5    5.98  )-----------( 47       ( 28 Aug 2023 09:55 )             27.8     Hgb Trend: 9.5<--, 9.4<--, 10.5<--, 9.6<--, 11.2<--  08-28    140  |  104  |  30<H>  ----------------------------<  125<H>  3.0<L>   |  28  |  2.29<H>    Ca    7.3<L>      28 Aug 2023 15:15  Phos  3.1     08-28  Mg     2.1     08-28    TPro  4.7<L>  /  Alb  1.6<L>  /  TBili  0.3  /  DBili  x   /  AST  230<H>  /  ALT  446<H>  /  AlkPhos  478<H>  08-28    Creatinine Trend: 2.29<--, 4.69<--, 3.82<--, 2.72<--, 3.92<--, 3.81<--    Lactate, Blood: 2.8  Lactate, Blood: 2.7  Lactate, Blood: 1.5    Urinalysis Basic - ( 28 Aug 2023 15:15 )    Color: x / Appearance: x / SG: x / pH: x  Gluc: 125 mg/dL / Ketone: x  / Bili: x / Urobili: x   Blood: x / Protein: x / Nitrite: x   Leuk Esterase: x / RBC: x / WBC x   Sq Epi: x / Non Sq Epi: x / Bacteria: x    MICROBIOLOGY:     Culture - Blood (collected 08-26-23 @ 10:10)  Source: .Blood Blood-Peripheral  Preliminary Report (08-29-23 @ 16:00):    No growth at 72 Hours    Culture - Urine (collected 08-25-23 @ 02:00)  Source: Clean Catch Clean Catch (Midstream)  Final Report (08-26-23 @ 06:56):    No growth    Culture - Blood (collected 08-24-23 @ 19:50)  Source: .Blood Blood-Peripheral  Gram Stain (08-27-23 @ 07:47):    Growth in aerobic bottle: Gram Negative Rods  Final Report (08-27-23 @ 07:47):    Growth in aerobic bottle: Klebsiella oxytoca/Raoultella ornithinolytica    Direct identification is available within approximately 3-5    hours either by Blood Panel Multiplexed PCR or Direct    MALDI-TOF. Details: [email or web address]  Organism: Blood Culture PCR  Klebsiella oxytoca /Raoutella ornithinolytica (08-27-23 @ 07:47)  Organism: Klebsiella oxytoca /Raoutella ornithinolytica (08-27-23 @ 07:47)      Method Type: TABITHA      -  Amikacin: S <=16      -  Ampicillin: R >16 These ampicillin results predict results for amoxicillin      -  Ampicillin/Sulbactam: S 8/4      -  Aztreonam: S <=4      -  Cefazolin: R 8      -  Cefepime: SDD 4 The breakpoint for susceptible dose dependent may require the usage of a higher cefepime dose (equivalent to adult dose of 2g q8h for normal renal function) for successful treatment.      -  Cefoxitin: S <=8      -  Ceftriaxone: S <=1      -  Ciprofloxacin: S <=0.25      -  Ertapenem: S <=0.5      -  Gentamicin: S <=2      -  Imipenem: S <=1      -  Levofloxacin: S <=0.5      -  Meropenem: S <=1      -  Piperacillin/Tazobactam: S <=8      -  Tobramycin: S <=2      -  Trimethoprim/Sulfamethoxazole: S 1/19  Organism: Blood Culture PCR (08-27-23 @ 07:47)      Method Type: PCR      -  Klebsiella oxytoca: Detec    Culture - Blood (collected 08-24-23 @ 19:50)  Source: .Blood Blood-Peripheral  Preliminary Report (08-29-23 @ 02:01):    No growth at 4 days    cefTRIAXone   IVPB: 100,  100,  100  piperacillin/tazobactam IVPB..: 25,  25,  25,  25,  25  piperacillin/tazobactam IVPB...: 200      RADIOLOGY:  ==========  < from: US Duplex Venous Upper Ext Ltd, Right (08.27.23 @ 11:30) >  The right internal jugular, subclavian, axillary and brachial veins are   patent and compressible where applicable.  The basilic vein (superficial  vein) is patent and without thrombus.  The cephalic vein (superficial   vein) is patent and without thrombus.    Doppler examination shows normal spontaneous and phasic flow.   Subcutaneous edema.    IMPRESSION:  No evidence of right upper extremity deep venous thrombosis.     US Abdomen Doppler (08.27.23 @ 11:30) >  FINDINGS:  Liver: Normal in size and echogenicity. No focal lesions are identified.   No ultrasound evidence of cirrhosis.  The main portal vein, right and left portal vein, hepatic veins and   hepatic artery are patent with normal directional flow. There is no   evidence of pleural vein thrombosis.  Bile ducts: Normal caliber. Common bile duct measures 7 mm.  Gallbladder: The gallbladder is contracted limiting its evaluation. No   stones identified. There is no sonographic Walter sign.  Pancreas: Poorly visualized.  Spleen: 8.8 cm. Within normal limits.  Right kidney: 5.1 cm. No hydronephrosis. Atrophic.  Left kidney: 4.9 cm. No hydronephrosis. Atrophic. There is 1.7 cm simple   appearing cyst in the upper pole.  Ascites: Small amount of ascites.  Aorta and IVC: Visualized portions are within normal limits.    IMPRESSION:  No evidence of cirrhosis or portal vein thrombosis.  Bilateral atrophic kidneys.    CT Chest No Cont (08.24.23 @ 21:19) >  IMPRESSION:  Bilateral pneumonia. Fluid layering in the large airways and distribution   of pneumonia suggests aspiration pneumonia. This is new compared to the   previous studies.    No other acute finding.    Interval decrease in size of cavitary lesion in the upper lobe of the   right lung, probably chronic or resolving cavitary pneumonia.    Sequela of chronic pancreatitis with biliary ductal dilation and a small   amount of air in adjacent to the pancreatic parenchyma. The amount of air   has decreased compared to 7/13/2023.    Renal atrophy.    Cachexia.        PULMONARY:  ============  -room air       CARDIAC:  ========  < from: Transthoracic Echocardiogram (12.23.22 @ 08:10) >  CONCLUSIONS:  1. Mitral annular calcification, otherwise normal mitral  valve.  2. Aortic valve not well visualized. Mild-moderate aortic  regurgitation.  3. Mild global left ventricular systolic dysfunction.  4. Normal right ventricular size and function.  5. Bilateral pleural effusions.    < end of copied text >

## 2023-08-29 NOTE — PROGRESS NOTE ADULT - ASSESSMENT
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  Admitted 8/24/2023 after being found unresponsive at home. Intubated in ED for obtundation/airway protection. U tox + opiates and benzo. Admitted to ICU on mechanical vent support. Weaned and extubated 8/25. Blood cx grew Klebsiella oxytoca/Raoultella ornithinolytica.    DX: acute respiratory failure with hypoxia requiring intubation, PNA/aspiration PNA, klebsiella oxytoca bacteremia, acute metabolic encephalopathy, cavitary lung lesion/lung abscess, ESRD on HD, chronic pain, malnutrition    - Extubated 8/25, transferred to medical floor 8/27  - doing well on RA  - maintaining O2 sat > 90%  - PNA and bacteremia treated with zosyn, now de-escalated to ceftriaxone to be completed 9/1  - metabolic encephalopathy in setting of opiate use ?overdose vs underlying infection, now back to baseline mental status  - CT reviewed and with RUL cavitary lesion / lung abscess decreased in size  - appreciate nutrition consult  - maintenance HD per nephrology  - continue pain management Q6 PRN   - on eliquis for atrial fibrillation - platelets down trending - should follow   - trend LFTS also - although denies abdominal pain   - discharge planning

## 2023-08-29 NOTE — PROGRESS NOTE ADULT - ASSESSMENT
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  Admitted 8/24/2023 after being found unresponsive at home.  Suspected Narcotic OD.  Intubated emergently in ED for Obtundation.  Noted to be profoundly Hypoglycemic.  Patient unresponsive on Vent on exam and unable to assist with HPI or ROS. Pt was found to have kleb oxytoca bacteremia, likely from aspiration pna. Pt started on ceftriaxone. Pt extubated 8/25 to NC and was dialyzed. Pt now HD stable, satting well on NC, and stable for downgrade to regular medical floor on 8/27/23.    Acute toxic encephalopathy with opioid overdose. now improved.   Sepsis with aspiration pneumonia and klebsiella bacteremia and associated acute hypoxic respiratory failure s/p intubation and extubation in the ICU. sepsis has resolved. cont iv ceftriaxone per ID.   Severe PCM. cont nepro supplement. nutrition service following  Hx of chronic pancreatitis. cont creon.   Hx of COPD. sable.   pA Fib. now sinus rhythm. cont eliquis. no active gross bleeding  AOCD. stable Hb.   ESRD. cont HD per renal  Hypokalemia. Repleted. Follow BMP in am.    PT eval pending.       DVT ppx: eliquis  Full code.  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  Admitted 8/24/2023 after being found unresponsive at home.  Suspected Narcotic OD.  Intubated emergently in ED for Obtundation.  Noted to be profoundly Hypoglycemic.  Patient unresponsive on Vent on exam and unable to assist with HPI or ROS. Pt was found to have kleb oxytoca bacteremia, likely from aspiration pna. Pt started on ceftriaxone. Pt extubated 8/25 to NC and was dialyzed. Pt now HD stable, satting well on NC, and stable for downgrade to regular medical floor on 8/27/23.    Acute toxic encephalopathy with opioid overdose. now improved.   Sepsis with aspiration pneumonia and klebsiella bacteremia and associated acute hypoxic respiratory failure s/p intubation and extubation in the ICU. sepsis has resolved. cont iv ceftriaxone per ID.   Severe PCM. cont nepro supplement. nutrition service following  Hx of chronic pancreatitis. cont creon.   Hx of COPD. sable.   pA Fib. now sinus rhythm. cont eliquis. no active gross bleeding  AOCD. stable Hb.   ESRD. cont HD per renal  Hypokalemia. Repleted. Follow BMP in am.  Chronic thrombocytopenia. slowly declining since July. no heparin use in [facility] since Feb, 2023. Trend.     PT eval pending.       DVT ppx: eliquis  Full code.

## 2023-08-29 NOTE — PHYSICAL THERAPY INITIAL EVALUATION ADULT - LIVES WITH, PROFILE
Pt states he lives in pvt house with elderly mother 5 steps with rails to enter, stays on main floor.

## 2023-08-29 NOTE — PROGRESS NOTE ADULT - SUBJECTIVE AND OBJECTIVE BOX
CHIEF COMPLAINT: FU of acute toxic encephalopathy with opioid overdose, sepsis with acute respiratory failure with aspiration pneumonia and klebsiella bacteremia  Patient was seen in HD facility  no fever  no n/v/d/cp/sob      PHYSICAL EXAM:    GENERAL: Malnourished and on room air  CHEST/LUNG:  No wheezing, no crackles   HEART: Regular rate and rhythm; No murmurs  ABDOMEN: Soft, Nontender, Nondistended; Bowel sounds present  EXTREMITIES:  No clubbing, cyanosis, or edema  Psychiatry: AAO x 3, mood is appropriate       OBJECTIVE DATA:     Vital Signs Last 24 Hrs  T(C): 36.7 (29 Aug 2023 12:06), Max: 37.4 (29 Aug 2023 06:20)  T(F): 98.1 (29 Aug 2023 12:06), Max: 99.3 (29 Aug 2023 06:20)  HR: 92 (29 Aug 2023 12:06) (92 - 102)  BP: 145/78 (29 Aug 2023 12:06) (124/81 - 163/81)  BP(mean): --  RR: 18 (29 Aug 2023 12:06) (18 - 18)  SpO2: 97% (29 Aug 2023 12:06) (97% - 99%)    Parameters below as of 29 Aug 2023 12:06  Patient On (Oxygen Delivery Method): room air               Daily     Daily Weight in k.2 (29 Aug 2023 06:20)  LABS:                        9.5    5.98  )-----------( 47       ( 28 Aug 2023 09:55 )             27.8             08-28    140  |  104  |  30<H>  ----------------------------<  125<H>  3.0<L>   |  28  |  2.29<H>    Ca    7.3<L>      28 Aug 2023 15:15  Phos  3.1     -  Mg     2.1         TPro  4.7<L>  /  Alb  1.6<L>  /  TBili  0.3  /  DBili  x   /  AST  230<H>  /  ALT  446<H>  /  AlkPhos  478<H>                  Urinalysis Basic - ( 28 Aug 2023 15:15 )    Color: x / Appearance: x / SG: x / pH: x  Gluc: 125 mg/dL / Ketone: x  / Bili: x / Urobili: x   Blood: x / Protein: x / Nitrite: x   Leuk Esterase: x / RBC: x / WBC x   Sq Epi: x / Non Sq Epi: x / Bacteria: x           CAPILLARY BLOOD GLUCOSE      POCT Blood Glucose.: 118 mg/dL (29 Aug 2023 10:52)      Culture - Blood (collected )  Source: .Blood Blood-Peripheral  Preliminary Report ():    No growth at 48 Hours    Culture - Urine (collected )  Source: Clean Catch Clean Catch (Midstream)  Final Report ():    No growth    Culture - Blood (collected )  Source: .Blood Blood-Peripheral  Gram Stain ():    Growth in aerobic bottle: Gram Negative Rods  Final Report ():    Growth in aerobic bottle: Klebsiella oxytoca/Raoultella ornithinolytica    Direct identification is available within approximately 3-5    hours either by Blood Panel Multiplexed PCR or Direct    MALDI-TOF. Details: [email or web address]  Organism: Blood Culture PCR  Klebsiella oxytoca /Raoutella ornithinolytica ()  Organism: Klebsiella oxytoca /Raoutella ornithinolytica ()    Sensitivities:      Method Type: TABITHA      -  Amikacin: S <=16      -  Ampicillin: R >16 These ampicillin results predict results for amoxicillin      -  Ampicillin/Sulbactam: S 8/4      -  Aztreonam: S <=4      -  Cefazolin: R 8      -  Cefepime: SDD 4 The breakpoint for susceptible dose dependent may require the usage of a higher cefepime dose (equivalent to adult dose of 2g q8h for normal renal function) for successful treatment.      -  Cefoxitin: S <=8      -  Ceftriaxone: S <=1      -  Ciprofloxacin: S <=0.25      -  Ertapenem: S <=0.5      -  Gentamicin: S <=2      -  Imipenem: S <=1      -  Levofloxacin: S <=0.5      -  Meropenem: S <=1      -  Piperacillin/Tazobactam: S <=8      -  Tobramycin: S <=2      -  Trimethoprim/Sulfamethoxazole: S   Organism: Blood Culture PCR ()    Sensitivities:      Method Type: PCR      -  Klebsiella oxytoca: Detec    Culture - Blood (collected )  Source: .Blood Blood-Peripheral  Preliminary Report ():    No growth at 4 days    MEDICATIONS  (STANDING):  apixaban 5 milliGRAM(s) Enteral Tube two times a day  cefTRIAXone   IVPB 2000 milliGRAM(s) IV Intermittent every 24 hours  chlorhexidine 2% Cloths 1 Application(s) Topical <User Schedule>  pancrelipase  (CREON  6,000 Lipase Units) 1 Capsule(s) Oral three times a day with meals  pantoprazole    Tablet 40 milliGRAM(s) Oral before breakfast    MEDICATIONS  (PRN):  benzocaine/menthol Lozenge 1 Lozenge Oral four times a day PRN Sore Throat  oxyCODONE    IR 5 milliGRAM(s) Oral every 6 hours PRN Moderate Pain (4 - 6)

## 2023-08-29 NOTE — PROGRESS NOTE ADULT - NS ATTEND AMEND GEN_ALL_CORE FT
pt seen and examined at bedside with NP    no acute events  remains on RA  oxygenating well  denies SOB, cough  hungry and wants more food    pt states today he did not take more pain meds than normal prior to admission  states he was feeling weak on way to dialysis and felt as if "something hit me in the gut and chest"   felt drained after HD and went home and sat on cough and that is the last thing he recalled  denies fevers or chills    62M PMH Alcohol Abuse, Chronic Pancreatitis, HCV (s/p Tx with SVR), COPD, Esophageal Stricture (s/p Stenting 4/22 with subsequent removal 12/16/2022), DVT (on Eliquis), ESRD (on HD, L AVF), Prior Lung Abscess (Pseudomonas / Morgenella), HTN, Chronic Systolic CHF, Chronic Pain (Narc Dependant), 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  Admitted 8/24/2023 after being found unresponsive at home. Intubated in ED for obtundation/airway protection. U tox + opiates and benzo. Admitted to ICU on mechanical vent support. Weaned and extubated 8/25. Blood cx grew Klebsiella oxytoca/Raoultella ornithinolytica.    DX: acute respiratory failure with hypoxia requiring intubation, PNA/aspiration PNA, klebsiella oxytoca bacteremia, acute metabolic encephalopathy, cavitary lung lesion/lung abscess, ESRD on HD, chronic pain, malnutrition    - weaned and extubated to RA  - remains on room air doing well on RA  - maintaining O2 sat > 90%  - no respiratory complaints, reports feeling well from breathing perspective, "better than normal"  - PNA and bacteremia treated with zosyn (x3 days), now de-escalated to ceftriaxone (day #3)  - would aim to complete a total of 7 day course of antibx for PNA and bacteremia, today is day #6 of antibx total  - metabolic encephalopathy in setting of opiate use ?overdose vs underlying infection, now back to baseline mental status. pt denies opiate overdose  - CT reviewed and with RUL cavitary lesion / lung abscess decreased in size  - appreciate nutrition consult  - maintenance HD per nephrology  - physical therapy  - social work eval: ? home services needed

## 2023-08-29 NOTE — PHYSICAL THERAPY INITIAL EVALUATION ADULT - STRENGTHENING, PT EVAL
Improve strength in the UE and LE to 4/5 or 5/, improve gen endurance to fair or good if possible and be able to perform functional tasks-bed mobility, sitting, standing, transfers and ambulate in a safe manner with assistive device and prevent falls.

## 2023-08-29 NOTE — PROGRESS NOTE ADULT - SUBJECTIVE AND OBJECTIVE BOX
IMELDA KEENAN  MRN-[id number]    Follow Up:  aspiration pna    Interval History: the pt was seen and examined earlier, no acute distress, awake and alert, answers questions appropriately, complains "everything hurts". Pt is afebrile, RA, no new cbc.     PAST MEDICAL & SURGICAL HISTORY:  ETOH abuse  sober x1 year approximately      Hepatitis C  treated      Gout      HTN (hypertension)      H/O chronic pancreatitis      Wound of right foot      ESRD on hemodialysis      DVT (deep venous thrombosis)      Gastric perforation          ROS:    [ ] Unobtainable because:  [x ] All other systems negative    Constitutional: no fever, no chills  Head: no trauma  Eyes: no vision changes, no eye pain  ENT:  no sore throat, no rhinorrhea  Cardiovascular:  no chest pain, no palpitation  Respiratory:  no SOB, no cough  GI:  no abd pain, no vomiting, no diarrhea  urinary: no dysuria, no hematuria, no flank pain  musculoskeletal:  no joint pain, no joint swelling, right arm swelling   skin:  no rash  neurology:  no headache, no seizure, no change in mental status  psych: no anxiety, no depression         Allergies  Tylenol (Other)        ANTIMICROBIALS:  cefTRIAXone   IVPB 2000 every 24 hours      OTHER MEDS:  apixaban 5 milliGRAM(s) Enteral Tube two times a day  benzocaine/menthol Lozenge 1 Lozenge Oral four times a day PRN  chlorhexidine 2% Cloths 1 Application(s) Topical <User Schedule>  oxyCODONE    IR 5 milliGRAM(s) Oral every 6 hours PRN  pancrelipase  (CREON  6,000 Lipase Units) 1 Capsule(s) Oral three times a day with meals  pantoprazole    Tablet 40 milliGRAM(s) Oral before breakfast      Vital Signs Last 24 Hrs  T(C): 36.7 (29 Aug 2023 12:06), Max: 37.4 (29 Aug 2023 06:20)  T(F): 98.1 (29 Aug 2023 12:06), Max: 99.3 (29 Aug 2023 06:20)  HR: 92 (29 Aug 2023 12:06) (92 - 102)  BP: 145/78 (29 Aug 2023 12:06) (124/81 - 163/81)  BP(mean): --  RR: 18 (29 Aug 2023 12:06) (18 - 18)  SpO2: 97% (29 Aug 2023 12:06) (97% - 99%)    Parameters below as of 29 Aug 2023 12:06  Patient On (Oxygen Delivery Method): room air        Physical Exam:  Constitutional: chronically ill, cachectic male in NAD   HEAD/EYES: anicteric, no conjunctival injection  ENT:  supple, no thrush  Cardiovascular:   normal S1, S2, no murmur, right arm swelling  Respiratory:  fine rales at the bases bilaterally, no wheezes, no rhonchi   GI:  soft, mildly diffusely tender, normal bowel sounds  :  no chen, no CVA tenderness  Musculoskeletal:  no synovitis, decreased ROM of the lower extremities, wiggles toes , LUE fistula with palpable thrill, wasted   Neurologic: awake and alert, no focal findings  Skin:  no rash, no erythemax3,  no phlebitis, left neck piv ok  Heme/Onc: no cervical  lymphadenopathy   Psychiatric:  awake, alert, calm behavior       WBC Count: 5.98 K/uL (08-28 @ 09:55)  WBC Count: 7.21 K/uL (08-27 @ 06:20)  WBC Count: 8.19 K/uL (08-26 @ 03:43)  WBC Count: 4.87 K/uL (08-25 @ 12:41)  WBC Count: 4.39 K/uL (08-25 @ 02:00)  WBC Count: 4.99 K/uL (08-24 @ 19:50)                            9.5    5.98  )-----------( 47       ( 28 Aug 2023 09:55 )             27.8       08-28    140  |  104  |  30<H>  ----------------------------<  125<H>  3.0<L>   |  28  |  2.29<H>    Ca    7.3<L>      28 Aug 2023 15:15  Phos  3.1     08-28  Mg     2.1     08-28    TPro  4.7<L>  /  Alb  1.6<L>  /  TBili  0.3  /  DBili  x   /  AST  230<H>  /  ALT  446<H>  /  AlkPhos  478<H>  08-28      Urinalysis Basic - ( 28 Aug 2023 15:15 )    Color: x / Appearance: x / SG: x / pH: x  Gluc: 125 mg/dL / Ketone: x  / Bili: x / Urobili: x   Blood: x / Protein: x / Nitrite: x   Leuk Esterase: x / RBC: x / WBC x   Sq Epi: x / Non Sq Epi: x / Bacteria: x        Creatinine Trend: 2.29<--, 4.69<--, 3.82<--, 2.72<--, 3.92<--, 3.81<--      MICROBIOLOGY:  v  .Blood Blood-Peripheral  08-26-23   No growth at 48 Hours  --  --      Clean Catch Clean Catch (Midstream)  08-25-23   No growth  --  --      .Blood Blood-Peripheral  08-24-23   No growth at 4 days  --  Blood Culture PCR  Klebsiella oxytoca /Raoutella ornithinolytica    Rapid RVP Result: NotDetec (08-24 @ 19:50)    SARS-CoV-2: NotDetec (08-24-23 @ 19:50)  Rapid RVP Result: NotDetec (08-24-23 @ 19:50)    SARS-CoV-2: NotDetec (24 Aug 2023 19:50)    RADIOLOGY:

## 2023-08-29 NOTE — PROGRESS NOTE ADULT - ASSESSMENT
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  Admitted 8/24/2023 after being found unresponsive at home.  Suspected Narcotic OD. He was intubated emergently in ED for obtundation.  Noted to be profoundly Hypoglycemic. Pt was found to have kleb oxytoca bacteremia, likely from aspiration pna. Pt started on ceftriaxone. Pt extubated 8/25 to NC and was dialyzed.  Patient responding to ceftriaxone and although has SDD to cefepime is S to ceftriaxone and now afebrile, normal wbc, no rigors, and normal vitals. Would continue the ceftriaxone 2g for now and monitor.  He reports numerous loose stools but not watery. He is hypokalemic and although this may be multifactorial it could be from all the stool events he is having. please address and if this continuous to worsen will have to send Clostridioides difficile   His LFTs are are rising and his platelets sare trending downward -should involve gastroenterology     8/29: remains afebrile, RA, no new cbc, Cr better, repeat BC x 1 with no growth to date, Ceftriaxone IV continued. Right arm with swelling, US venous doppler was done prior, negative for DVT.     Klebsiella bacteremia  ESRD   Hypokalemia   Thrombocytopenia   Transaminitis     Plan:  continue ceftriaxone 2g q24hrs   follow cultures - repeat BC x 1 with no growth to date   watch for fevers   hemodialysis per renal   GI consult  trend LFTs  trend platelets  consider hematology consult if platelets continue to fall   pain control   pulmonology evaluation is appreciated     Discussed with Dr. Gardiner

## 2023-08-29 NOTE — PHYSICAL THERAPY INITIAL EVALUATION ADULT - GAIT TRAINING, PT EVAL
Attempted gait assessment  but unable to perform due to generalized weakness and c/o body aches and pains in the UE and LE

## 2023-08-29 NOTE — PROGRESS NOTE ADULT - NS ATTEND AMEND GEN_ALL_CORE FT
I have reviewed all pertinent clinical information and agree with the NP's note.   continue ceftriaxone   pain management   stool count  monitor platelets closely-Hematology consult     Burak Gardiner, DO  Infectious Disease Attending  Reachable via Microsoft Teams or ID office: [phone]  After 5pm/weekends please call [phone] for all inquiries and new consults

## 2023-08-30 NOTE — PROGRESS NOTE ADULT - ASSESSMENT
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  Admitted 8/24/2023 after being found unresponsive at home.  Suspected Narcotic OD.  Intubated emergently in ED for Obtundation.  Noted to be profoundly Hypoglycemic.  Patient unresponsive on Vent on exam and unable to assist with HPI or ROS. Pt was found to have kleb oxytoca bacteremia, likely from aspiration pna. Pt started on ceftriaxone. Pt extubated 8/25 to NC and was dialyzed. Pt now HD stable, satting well on NC, and stable for downgrade to regular medical floor on 8/27/23.    Acute toxic encephalopathy with opioid overdose. now improved. - verified istop and with NP at pcp office oxycodone 10mg   Sepsis with aspiration pneumonia and klebsiella bacteremia and associated acute hypoxic respiratory failure s/p intubation and extubation in the ICU. sepsis has resolved. cont iv ceftriaxone per ID.   Severe PCM. cont nepro supplement. nutrition service following  Hx of chronic pancreatitis. cont creon.   Hx of COPD. sable.   pA Fib. now sinus rhythm. cont eliquis. no active gross bleeding  AOCD. stable Hb.   ESRD. cont HD per renal  Hypokalemia. Repleted. Follow BMP in am.  Chronic thrombocytopenia. slowly declining since July. no heparin use in [facility] since Feb, 2023. Trend.     PT eval pending.       DVT ppx: eliquis  Full code.

## 2023-08-30 NOTE — PROGRESS NOTE ADULT - NS ATTEND AMEND GEN_ALL_CORE FT
I have reviewed all pertinent clinical information and agree with the NP's note.   day 6 of negative blood cultures   if patient is doing well tomorrow can d/c antibiotics after ceftriaxone dose    Burak Gardiner,   Infectious Disease Attending  Reachable via Microsoft Teams or ID office: [phone]  After 5pm/weekends please call [phone] for all inquiries and new consults

## 2023-08-30 NOTE — PROGRESS NOTE ADULT - ASSESSMENT
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  Admitted 8/24/2023 after being found unresponsive at home.  Suspected Narcotic OD. He was intubated emergently in ED for obtundation.  Noted to be profoundly Hypoglycemic. Pt was found to have kleb oxytoca bacteremia, likely from aspiration pna. Pt started on ceftriaxone. Pt extubated 8/25 to NC and was dialyzed.  Patient responding to ceftriaxone and although has SDD to cefepime is S to ceftriaxone and now afebrile, normal wbc, no rigors, and normal vitals. Would continue the ceftriaxone 2g for now and monitor.  He reports numerous loose stools but not watery. He is hypokalemic and although this may be multifactorial it could be from all the stool events he is having. please address and if this continuous to worsen will have to send Clostridioides difficile   His LFTs are are rising and his platelets sare trending downward -should involve gastroenterology     8/29: remains afebrile, RA, no new cbc, Cr better, repeat BC x 1 with no growth to date, Ceftriaxone IV continued. Right arm with swelling, US venous doppler was done prior, negative for DVT.   Attending addendum:  I have reviewed all pertinent clinical information and agree with the NP's note.   continue ceftriaxone   pain management   stool count  monitor platelets closely-Hematology consult     8/30: no fevers, tachycardic, RA, no new lab work, repeat BCs with no growth to date, Ceftriaxone IV continued. Pt denies having diarrhea, reports more solid stools now.     Klebsiella bacteremia  ESRD   Hypokalemia   Thrombocytopenia   Transaminitis     Plan:  continue ceftriaxone 2g q24hrs   follow cultures - repeat BC x 1 with no growth to date   watch for fevers   hemodialysis per renal   GI consult  trend LFTs  trend platelets  consider hematology consult if platelets continue to fall   pain control   obtain routine lab work

## 2023-08-30 NOTE — PROGRESS NOTE ADULT - SUBJECTIVE AND OBJECTIVE BOX
IMELDA KEENAN  MRN-[id number]    Follow Up:  Klebsiella bacteremia     Interval History: the pt was seen and examined earlier, no change, pt continues to complains about "everything hurts". No fevers, tachycardic, RA, no new lab work.     PAST MEDICAL & SURGICAL HISTORY:  ETOH abuse  sober x1 year approximately      Hepatitis C  treated      Gout      HTN (hypertension)      H/O chronic pancreatitis      Wound of right foot      ESRD on hemodialysis      DVT (deep venous thrombosis)      Gastric perforation          ROS:    [ ] Unobtainable because:  [x ] All other systems negative    Constitutional: no fever, no chills, general body pain   Head: no trauma  Eyes: no vision changes, no eye pain  ENT:  no sore throat, no rhinorrhea  Cardiovascular:  no chest pain, no palpitation  Respiratory:  no SOB, no cough  GI:  no abd pain, no vomiting, reports his stools are more solid now  urinary: no dysuria, no hematuria, no flank pain  musculoskeletal:  no joint pain, no joint swelling  skin:  no rash  neurology:  no headache, no seizure, no change in mental status  psych: no anxiety, no depression         Allergies  Tylenol (Other)        ANTIMICROBIALS:  cefTRIAXone   IVPB 2000 every 24 hours      OTHER MEDS:  apixaban 5 milliGRAM(s) Enteral Tube two times a day  benzocaine/menthol Lozenge 1 Lozenge Oral four times a day PRN  chlorhexidine 2% Cloths 1 Application(s) Topical <User Schedule>  oxyCODONE    IR 5 milliGRAM(s) Oral every 6 hours PRN  pancrelipase  (CREON  6,000 Lipase Units) 1 Capsule(s) Oral three times a day with meals  pantoprazole    Tablet 40 milliGRAM(s) Oral before breakfast      Vital Signs Last 24 Hrs  T(C): 37.1 (30 Aug 2023 12:09), Max: 37.1 (30 Aug 2023 12:09)  T(F): 98.7 (30 Aug 2023 12:09), Max: 98.7 (30 Aug 2023 12:09)  HR: 101 (30 Aug 2023 12:09) (101 - 104)  BP: 155/82 (30 Aug 2023 12:09) (143/82 - 155/82)  BP(mean): --  RR: 16 (30 Aug 2023 12:09) (16 - 18)  SpO2: 100% (30 Aug 2023 12:09) (99% - 100%)    Parameters below as of 30 Aug 2023 05:49  Patient On (Oxygen Delivery Method): room air        Physical Exam:  Constitutional: chronically ill, cachectic male in NAD   HEAD/EYES: anicteric, no conjunctival injection  ENT:  supple, no thrush  Cardiovascular:   normal S1, S2, no murmur, right arm swelling  Respiratory: diminished bilaterally, no wheezes, no rhonchi   GI:  soft, mildly diffusely tender, normal bowel sounds  :  no chen, no CVA tenderness  Musculoskeletal:  no synovitis, decreased ROM of the lower extremities, wiggles toes , LUE fistula with palpable thrill, wasted   Neurologic: awake and alert, no focal findings  Skin:  no rash, no erythemax3,  no phlebitis,   Heme/Onc: no cervical  lymphadenopathy   Psychiatric:  awake, alert, calm behavior     WBC Count: 5.98 K/uL (08-28 @ 09:55)  WBC Count: 7.21 K/uL (08-27 @ 06:20)  WBC Count: 8.19 K/uL (08-26 @ 03:43)  WBC Count: 4.87 K/uL (08-25 @ 12:41)  WBC Count: 4.39 K/uL (08-25 @ 02:00)  WBC Count: 4.99 K/uL (08-24 @ 19:50)    Creatinine Trend: 2.29<--, 4.69<--, 3.82<--, 2.72<--, 3.92<--, 3.81<--    MICROBIOLOGY:  v  .Blood Blood-Peripheral  08-26-23   No growth at 72 Hours  --  --      Clean Catch Clean Catch (Midstream)  08-25-23   No growth  --  --      .Blood Blood-Peripheral  08-24-23   No growth at 5 days  --  Blood Culture PCR  Klebsiella oxytoca /Raoutella ornithinolytica    Rapid RVP Result: NotDetec (08-24 @ 19:50)    SARS-CoV-2: NotDetec (24 Aug 2023 19:50)    RADIOLOGY:

## 2023-08-30 NOTE — PROGRESS NOTE ADULT - ASSESSMENT
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  Admitted 8/24/2023 after being found unresponsive at home. Intubated in ED for obtundation/airway protection. U tox + opiates and benzo. Admitted to ICU on mechanical vent support. Weaned and extubated 8/25. Blood cx grew Klebsiella oxytoca/Raoultella ornithinolytica.    DX: acute respiratory failure with hypoxia requiring intubation, PNA/aspiration PNA, klebsiella oxytoca bacteremia, acute metabolic encephalopathy, cavitary lung lesion/lung abscess, ESRD on HD, chronic pain, malnutrition    - due for HD today   - Extubated 8/25, transferred to medical floor 8/27  - doing well on RA  - maintaining O2 sat > 90%  - PNA and bacteremia treated with zosyn, now de-escalated to ceftriaxone to be completed 9/1  - metabolic encephalopathy in setting of opiate use ?overdose vs underlying infection, now back to baseline mental status  - CT reviewed and with RUL cavitary lesion / lung abscess decreased in size  - appreciate nutrition consult  - maintenance HD per nephrology  - continue pain management Q6 PRN   - on eliquis for atrial fibrillation - platelets down trending - should follow   - trend LFTS also - although denies abdominal pain   - discharge planning      62M PMH Alcohol Abuse, Chronic Pancreatitis, HCV (s/p Tx with SVR), COPD, Esophageal Stricture (s/p Stenting 4/22 with subsequent removal 12/16/2022), DVT ( on Eliquis), ESRD ( on HD), Prior Lung Abscess (Pseudomonas / Morgenella), HTN, Chronic Systolic CHF, Chronic Pain ( Narc Dependant), 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  Admitted 8/24/2023 after being found unresponsive at home. Intubated in ED for obtundation/airway protection. U tox + opiates and benzo. Admitted to ICU on mechanical vent support. Weaned and extubated 8/25. Blood cx grew Klebsiella oxytoca/Raoultella ornithinolytica.    DX: acute respiratory failure with hypoxia requiring intubation, PNA/aspiration PNA, klebsiella oxytoca bacteremia, acute metabolic encephalopathy, cavitary lung lesion/lung abscess, ESRD on HD, chronic pain, malnutrition    - Extubated 8/25, transferred to medical floor 8/27  - doing well on RA  - maintaining O2 sat > 90%  - PNA and bacteremia treated with zosyn, now de-escalated to ceftriaxone to be completed 9/1  - CT reviewed and with RUL cavitary lesion / lung abscess decreased in size    - metabolic encephalopathy in setting of opiate use ?overdose vs underlying infection, now back to baseline mental status  - appreciate nutrition consult    - patient due for HD today  - blood work with HD   - continue pain management Q6 PRN     - on eliquis for atrial fibrillation - platelets down trending - should follow   - trend LFTS also - although denies abdominal pain   - discharge planning

## 2023-08-30 NOTE — PROGRESS NOTE ADULT - SUBJECTIVE AND OBJECTIVE BOX
CHIEF COMPLAINT: FU of acute toxic encephalopathy with opioid overdose, sepsis with acute respiratory failure with aspiration pneumonia and klebsiella bacteremia  Patient was seen in HD facility  no fever  no n/v/d/cp/sob      MEDICATIONS  (STANDING):  apixaban 5 milliGRAM(s) Enteral Tube two times a day  cefTRIAXone   IVPB 2000 milliGRAM(s) IV Intermittent every 24 hours  chlorhexidine 2% Cloths 1 Application(s) Topical <User Schedule>  pancrelipase  (CREON  6,000 Lipase Units) 1 Capsule(s) Oral three times a day with meals  pantoprazole    Tablet 40 milliGRAM(s) Oral before breakfast    MEDICATIONS  (PRN):  benzocaine/menthol Lozenge 1 Lozenge Oral four times a day PRN Sore Throat  oxyCODONE    IR 10 milliGRAM(s) Oral every 4 hours PRN Severe Pain (7 - 10)        Vital Signs Last 24 Hrs  T(C): 36.6 (30 Aug 2023 17:45), Max: 37.1 (30 Aug 2023 12:09)  T(F): 97.9 (30 Aug 2023 17:45), Max: 98.7 (30 Aug 2023 12:09)  HR: 102 (30 Aug 2023 17:45) (101 - 104)  BP: 155/83 (30 Aug 2023 17:45) (143/82 - 155/83)  BP(mean): --  RR: 17 (30 Aug 2023 17:45) (16 - 17)  SpO2: 98% (30 Aug 2023 17:45) (98% - 100%)    Parameters below as of 30 Aug 2023 17:45  Patient On (Oxygen Delivery Method): room air        PHYSICAL EXAM:    GENERAL: Malnourished and on room air  CHEST/LUNG:  No wheezing, no crackles   HEART: Regular rate and rhythm; No murmurs  ABDOMEN: Soft, Nontender, Nondistended; Bowel sounds present  EXTREMITIES:  No clubbing, cyanosis, or +edema to rue   Psychiatry: AAO x 3, mood is appropriate         Labs:             CAPILLARY BLOOD GLUCOSE    Culture - Blood (collected 08-26)  Source: .Blood Blood-Peripheral  Preliminary Report (08-28):    No growth at 48 Hours    Culture - Urine (collected 08-25)  Source: Clean Catch Clean Catch (Midstream)  Final Report (08-26):    No growth    Culture - Blood (collected 08-24)  Source: .Blood Blood-Peripheral  Gram Stain (08-27):    Growth in aerobic bottle: Gram Negative Rods  Final Report (08-27):    Growth in aerobic bottle: Klebsiella oxytoca/Raoultella ornithinolytica    Direct identification is available within approximately 3-5    hours either by Blood Panel Multiplexed PCR or Direct    MALDI-TOF. Details: [email or web address]  Organism: Blood Culture PCR  Klebsiella oxytoca /Raoutella ornithinolytica (08-27)  Organism: Klebsiella oxytoca /Raoutella ornithinolytica (08-27)    Sensitivities:      Method Type: TABITHA      -  Amikacin: S <=16      -  Ampicillin: R >16 These ampicillin results predict results for amoxicillin      -  Ampicillin/Sulbactam: S 8/4      -  Aztreonam: S <=4      -  Cefazolin: R 8      -  Cefepime: SDD 4 The breakpoint for susceptible dose dependent may require the usage of a higher cefepime dose (equivalent to adult dose of 2g q8h for normal renal function) for successful treatment.      -  Cefoxitin: S <=8      -  Ceftriaxone: S <=1      -  Ciprofloxacin: S <=0.25      -  Ertapenem: S <=0.5      -  Gentamicin: S <=2      -  Imipenem: S <=1      -  Levofloxacin: S <=0.5      -  Meropenem: S <=1      -  Piperacillin/Tazobactam: S <=8      -  Tobramycin: S <=2      -  Trimethoprim/Sulfamethoxazole: S 1/19  Organism: Blood Culture PCR (08-27)    Sensitivities:      Method Type: PCR      -  Klebsiella oxytoca: Detec    Culture - Blood (collected 08-24)  Source: .Blood Blood-Peripheral  Preliminary Report (08-29):    No growth at 4 days    MEDICATIONS  (STANDING):  apixaban 5 milliGRAM(s) Enteral Tube two times a day  cefTRIAXone   IVPB 2000 milliGRAM(s) IV Intermittent every 24 hours  chlorhexidine 2% Cloths 1 Application(s) Topical <User Schedule>  pancrelipase  (CREON  6,000 Lipase Units) 1 Capsule(s) Oral three times a day with meals  pantoprazole    Tablet 40 milliGRAM(s) Oral before breakfast    MEDICATIONS  (PRN):  benzocaine/menthol Lozenge 1 Lozenge Oral four times a day PRN Sore Throat  oxyCODONE    IR 5 milliGRAM(s) Oral every 6 hours PRN Moderate Pain (4 - 6)

## 2023-08-30 NOTE — PROGRESS NOTE ADULT - SUBJECTIVE AND OBJECTIVE BOX
CHIEF COMPLAINT:  ===============      INTERVAL EVENTS:  ==================          REVIEW OF SYSTEMS:  ==================  - no fever, no chills  - no HA, no dizziness  - no visual changes, no auditory changes  - no sore throat, no sinus congestion  - no SOB, no cough  - no chest pain, no palpitations  - no abdominal pain, no N/V/D  - no dysuria, no hematuria  - no myalgias, no arthralgias  - no pain in extremity, no swelling   - no rashes, no pruritis  - no neuro deficits  - no psychiatric concerns       HPI:      OBJECTIVE:  ===========    ICU Vital Signs Last 24 Hrs  T(C): 36.9 (30 Aug 2023 05:49), Max: 37 (29 Aug 2023 17:23)  T(F): 98.5 (30 Aug 2023 05:49), Max: 98.6 (29 Aug 2023 17:23)  HR: 104 (30 Aug 2023 05:49) (92 - 104)  BP: 143/82 (30 Aug 2023 05:49) (143/82 - 163/81)  BP(mean): --  ABP: --  ABP(mean): --  RR: 16 (30 Aug 2023 05:49) (16 - 18)  SpO2: 100% (30 Aug 2023 05:49) (97% - 100%)    O2 Parameters below as of 30 Aug 2023 05:49  Patient On (Oxygen Delivery Method): room air              08-29 @ 07:01  -  08-30 @ 07:00  --------------------------------------------------------  IN: 550 mL / OUT: 0 mL / NET: 550 mL      CAPILLARY BLOOD GLUCOSE      POCT Blood Glucose.: 127 mg/dL (29 Aug 2023 21:08)      PHYSICAL EXAM:  ==============  General:   HEENT:   Respiratory:   Cardiovascular:   Abdomen:   Extremities:   Skin:   Neurological:  Psychiatry:    HOSPITAL MEDICATIONS:  ======================  apixaban 5 milliGRAM(s) Enteral Tube two times a day    cefTRIAXone   IVPB 2000 milliGRAM(s) IV Intermittent every 24 hours          oxyCODONE    IR 5 milliGRAM(s) Oral every 6 hours PRN    pancrelipase  (CREON  6,000 Lipase Units) 1 Capsule(s) Oral three times a day with meals  pantoprazole    Tablet 40 milliGRAM(s) Oral before breakfast            benzocaine/menthol Lozenge 1 Lozenge Oral four times a day PRN  chlorhexidine 2% Cloths 1 Application(s) Topical <User Schedule>        LABS:  =====                        9.5    5.98  )-----------( 47       ( 28 Aug 2023 09:55 )             27.8     Hgb Trend: 9.5<--, 9.4<--, 10.5<--, 9.6<--, 11.2<--  08-28    140  |  104  |  30<H>  ----------------------------<  125<H>  3.0<L>   |  28  |  2.29<H>    Ca    7.3<L>      28 Aug 2023 15:15  Phos  3.1     08-28  Mg     2.1     08-28    TPro  4.7<L>  /  Alb  1.6<L>  /  TBili  0.3  /  DBili  x   /  AST  230<H>  /  ALT  446<H>  /  AlkPhos  478<H>  08-28    Creatinine Trend: 2.29<--, 4.69<--, 3.82<--, 2.72<--, 3.92<--, 3.81<--    Lactate, Blood: 2.8  Lactate, Blood: 2.7  Lactate, Blood: 1.5    Urinalysis Basic - ( 28 Aug 2023 15:15 )    Color: x / Appearance: x / SG: x / pH: x  Gluc: 125 mg/dL / Ketone: x  / Bili: x / Urobili: x   Blood: x / Protein: x / Nitrite: x   Leuk Esterase: x / RBC: x / WBC x   Sq Epi: x / Non Sq Epi: x / Bacteria: x              MICROBIOLOGY:     Culture - Blood (collected 08-26-23 @ 10:10)  Source: .Blood Blood-Peripheral  Preliminary Report (08-29-23 @ 16:00):    No growth at 72 Hours    Culture - Urine (collected 08-25-23 @ 02:00)  Source: Clean Catch Clean Catch (Midstream)  Final Report (08-26-23 @ 06:56):    No growth    Culture - Blood (collected 08-24-23 @ 19:50)  Source: .Blood Blood-Peripheral  Gram Stain (08-27-23 @ 07:47):    Growth in aerobic bottle: Gram Negative Rods  Final Report (08-27-23 @ 07:47):    Growth in aerobic bottle: Klebsiella oxytoca/Raoultella ornithinolytica    Direct identification is available within approximately 3-5    hours either by Blood Panel Multiplexed PCR or Direct    MALDI-TOF. Details: [email or web address]  Organism: Blood Culture PCR  Klebsiella oxytoca /Raoutella ornithinolytica (08-27-23 @ 07:47)  Organism: Klebsiella oxytoca /Raoutella ornithinolytica (08-27-23 @ 07:47)      Method Type: TABITHA      -  Amikacin: S <=16      -  Ampicillin: R >16 These ampicillin results predict results for amoxicillin      -  Ampicillin/Sulbactam: S 8/4      -  Aztreonam: S <=4      -  Cefazolin: R 8      -  Cefepime: SDD 4 The breakpoint for susceptible dose dependent may require the usage of a higher cefepime dose (equivalent to adult dose of 2g q8h for normal renal function) for successful treatment.      -  Cefoxitin: S <=8      -  Ceftriaxone: S <=1      -  Ciprofloxacin: S <=0.25      -  Ertapenem: S <=0.5      -  Gentamicin: S <=2      -  Imipenem: S <=1      -  Levofloxacin: S <=0.5      -  Meropenem: S <=1      -  Piperacillin/Tazobactam: S <=8      -  Tobramycin: S <=2      -  Trimethoprim/Sulfamethoxazole: S 1/19  Organism: Blood Culture PCR (08-27-23 @ 07:47)      Method Type: PCR      -  Klebsiella oxytoca: Detec    Culture - Blood (collected 08-24-23 @ 19:50)  Source: .Blood Blood-Peripheral  Final Report (08-30-23 @ 02:01):    No growth at 5 days    cefTRIAXone   IVPB: 100,  100,  100,  100  piperacillin/tazobactam IVPB..: 25,  25,  25,  25,  25  piperacillin/tazobactam IVPB...: 200      RADIOLOGY:  ==========    PULMONARY:  ============        CARDIAC:  ========   CHIEF COMPLAINT:  ===============  - found unresponsive   -respiratory failure requiring intubation     INTERVAL EVENTS:  ==================    -has a lot of pain this AM  -requesting Dilaudid     REVIEW OF SYSTEMS:  ==================  - no fever, no chills  - no HA, no dizziness  - no visual changes, no auditory changes  - no sore throat, no sinus congestion  - no SOB, no cough  - no chest pain, no palpitations  - no abdominal pain, no N/V/D  - no dysuria, no hematuria  - no myalgias, no arthralgias  - + pain all over   - no rashes, no pruritis  - no neuro deficits  - no psychiatric concerns       HPI:  62 year old male PMHx Alcohol Abuse, Chronic Pancreatitis, HCV (s/p Tx with SVR), COPD, Esophageal Stricture (s/p Stenting 4/22 with subsequest removal 12/16/2022), DVT ( on Eliquis), ESRD ( on HD), Prior Lung Abscess (Pseudomonas / Morgenella), HTN, Chronic Systolic CHF, Chronic Pain ( Narc Dependant), 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    Admitted 8/24/2023 after being found unresponsive at home.  Suspected Narcotic OD.  Intubated emergently in ED for Obtundation.  Noted to be profoundly Hypoglycemic.  Patient unresponsive on Vent on exam and unable to assist with HPI or ROS. Pt was found to have kleb oxytoca bacteremia, likely from aspiration pna. Pt started on ceftriaxone.   Pt extubated 8/25 to NC and was dialyzed. Pt now HD stable, satting well on NC, and stable for downgrade to regular medical floor       OBJECTIVE:  ===========    ICU Vital Signs Last 24 Hrs  T(C): 36.9 (30 Aug 2023 05:49), Max: 37 (29 Aug 2023 17:23)  T(F): 98.5 (30 Aug 2023 05:49), Max: 98.6 (29 Aug 2023 17:23)  HR: 104 (30 Aug 2023 05:49) (92 - 104)  BP: 143/82 (30 Aug 2023 05:49) (143/82 - 163/81)  RR: 16 (30 Aug 2023 05:49) (16 - 18)  SpO2: 100% (30 Aug 2023 05:49) (97% - 100%)    O2 Parameters below as of 30 Aug 2023 05:49  Patient On (Oxygen Delivery Method): room air      08-29 @ 07:01  -  08-30 @ 07:00  --------------------------------------------------------  IN: 550 mL / OUT: 0 mL / NET: 550 mL      CAPILLARY BLOOD GLUCOSE  POCT Blood Glucose.: 127 mg/dL (29 Aug 2023 21:08)      PHYSICAL EXAM:  ==============  General: awake alert complain of pain this AM   Respiratory: CTA B/L No wheezing/rales/ rhonchi  Cardiovascular: S1S2 RRR   Abdomen: soft + BS  Extremities: Lower extremities thin, Right Upper extremity -2+ edema, LUE ANG +/+  Skin: Intact   Neurological: No deficits   Psychiatry: appropriate     HOSPITAL MEDICATIONS:  ======================  apixaban 5 milliGRAM(s) Enteral Tube two times a day  cefTRIAXone   IVPB 2000 milliGRAM(s) IV Intermittent every 24 hours  oxyCODONE    IR 5 milliGRAM(s) Oral every 6 hours PRN  pancrelipase  (CREON  6,000 Lipase Units) 1 Capsule(s) Oral three times a day with meals  pantoprazole    Tablet 40 milliGRAM(s) Oral before breakfast  benzocaine/menthol Lozenge 1 Lozenge Oral four times a day PRN  chlorhexidine 2% Cloths 1 Application(s) Topical <User Schedule>      LABS:  =====                        9.5    5.98  )-----------( 47       ( 28 Aug 2023 09:55 )             27.8     Hgb Trend: 9.5<--, 9.4<--, 10.5<--, 9.6<--, 11.2<--  08-28    140  |  104  |  30<H>  ----------------------------<  125<H>  3.0<L>   |  28  |  2.29<H>    Ca    7.3<L>      28 Aug 2023 15:15  Phos  3.1     08-28  Mg     2.1     08-28    TPro  4.7<L>  /  Alb  1.6<L>  /  TBili  0.3  /  DBili  x   /  AST  230<H>  /  ALT  446<H>  /  AlkPhos  478<H>  08-28    Creatinine Trend: 2.29<--, 4.69<--, 3.82<--, 2.72<--, 3.92<--, 3.81<--    Lactate, Blood: 2.8  Lactate, Blood: 2.7  Lactate, Blood: 1.5    Urinalysis Basic - ( 28 Aug 2023 15:15 )    Color: x / Appearance: x / SG: x / pH: x  Gluc: 125 mg/dL / Ketone: x  / Bili: x / Urobili: x   Blood: x / Protein: x / Nitrite: x   Leuk Esterase: x / RBC: x / WBC x   Sq Epi: x / Non Sq Epi: x / Bacteria: x              MICROBIOLOGY:     Culture - Blood (collected 08-26-23 @ 10:10)  Source: .Blood Blood-Peripheral  Preliminary Report (08-29-23 @ 16:00):    No growth at 72 Hours    Culture - Urine (collected 08-25-23 @ 02:00)  Source: Clean Catch Clean Catch (Midstream)  Final Report (08-26-23 @ 06:56):    No growth    Culture - Blood (collected 08-24-23 @ 19:50)  Source: .Blood Blood-Peripheral  Gram Stain (08-27-23 @ 07:47):    Growth in aerobic bottle: Gram Negative Rods  Final Report (08-27-23 @ 07:47):    Growth in aerobic bottle: Klebsiella oxytoca/Raoultella ornithinolytica    Direct identification is available within approximately 3-5    hours either by Blood Panel Multiplexed PCR or Direct    MALDI-TOF. Details: [email or web address]  Organism: Blood Culture PCR  Klebsiella oxytoca /Raoutella ornithinolytica (08-27-23 @ 07:47)  Organism: Klebsiella oxytoca /Raoutella ornithinolytica (08-27-23 @ 07:47)      Method Type: TABITHA      -  Amikacin: S <=16      -  Ampicillin: R >16 These ampicillin results predict results for amoxicillin      -  Ampicillin/Sulbactam: S 8/4      -  Aztreonam: S <=4      -  Cefazolin: R 8      -  Cefepime: SDD 4 The breakpoint for susceptible dose dependent may require the usage of a higher cefepime dose (equivalent to adult dose of 2g q8h for normal renal function) for successful treatment.      -  Cefoxitin: S <=8      -  Ceftriaxone: S <=1      -  Ciprofloxacin: S <=0.25      -  Ertapenem: S <=0.5      -  Gentamicin: S <=2      -  Imipenem: S <=1      -  Levofloxacin: S <=0.5      -  Meropenem: S <=1      -  Piperacillin/Tazobactam: S <=8      -  Tobramycin: S <=2      -  Trimethoprim/Sulfamethoxazole: S 1/19  Organism: Blood Culture PCR (08-27-23 @ 07:47)      Method Type: PCR      -  Klebsiella oxytoca: Detec    Culture - Blood (collected 08-24-23 @ 19:50)  Source: .Blood Blood-Peripheral  Final Report (08-30-23 @ 02:01):    No growth at 5 days    cefTRIAXone   IVPB: 100,  100,  100,  100  piperacillin/tazobactam IVPB..: 25,  25,  25,  25,  25  piperacillin/tazobactam IVPB...: 200    RADIOLOGY  ==========   US Duplex Venous Upper Ext Ltd, Right (08.27.23 @ 11:30) >  IMPRESSION:  No evidence of right upper extremity deep venous thrombosis.    US Abdomen Doppler (08.27.23 @ 11:30) >  FINDINGS:  Liver: Normal in size and echogenicity. No focal lesions are identified.   No ultrasound evidence of cirrhosis.    The main portal vein, right and left portal vein, hepatic veins and   hepatic artery are patent with normal directional flow. There is no   evidence of pleural vein thrombosis.  Bile ducts: Normal caliber. Common bile duct measures 7 mm.  Gallbladder: The gallbladder is contracted limiting its evaluation. No   stones identified. There is no sonographic Walter sign.  Pancreas: Poorly visualized.  Spleen: 8.8 cm. Within normal limits.  Right kidney: 5.1 cm. No hydronephrosis. Atrophic.  Left kidney: 4.9 cm. No hydronephrosis. Atrophic. There is 1.7 cm simple   appearing cyst in the upper pole.  Ascites: Small amount of ascites.  Aorta and IVC: Visualized portions are within normal limits.    IMPRESSION:  No evidence of cirrhosis or portal vein thrombosis.  Bilateral atrophic kidneys.    CT Head No Cont (08.24.23 @ 21:15) >  IMPRESSION:  CT head: No evidence of acute transcortical infarct, acute intracranial   hemorrhage or mass effect.  CT cervical spine: No acute fracture or traumatic subluxation.    CT Abdomen and Pelvis No Cont (08.24.23 @ 21:19) >  IMPRESSION:  Bilateral pneumonia. Fluid layering in the large airways and distribution   of pneumonia suggests aspiration pneumonia. This is new compared to the   previous studies.    No other acute finding.    Interval decrease in size of cavitary lesion in the upper lobe of the   right lung, probably chronic or resolving cavitary pneumonia.    Sequela of chronic pancreatitis with biliary ductal dilation and a small   amount of air in adjacent to the pancreatic parenchyma. The amount of air   has decreased compared to 7/13/2023.    Renal atrophy.    Cachexia.    PULMONARY:  ============  - extubated 8/25  -now on room air     CARDIAC:  ========  -NO echo on this admission  CHIEF COMPLAINT:  ===============  - found unresponsive   - respiratory failure requiring intubation     INTERVAL EVENTS:  ==================    -has a lot of pain this AM  -requesting Dilaudid     REVIEW OF SYSTEMS:  ==================  - no fever, no chills  - no HA, no dizziness  - no visual changes, no auditory changes  - no sore throat, no sinus congestion  - no SOB, no cough  - no chest pain, no palpitations  - no abdominal pain, no N/V/D  - no dysuria, no hematuria  - no myalgias, no arthralgias  - + pain all over   - no rashes, no pruritis  - no neuro deficits  - no psychiatric concerns       HPI:  62 year old male PMHx Alcohol Abuse, Chronic Pancreatitis, HCV (s/p Tx with SVR), COPD, Esophageal Stricture (s/p Stenting 4/22 with subsequest removal 12/16/2022), DVT ( on Eliquis), ESRD ( on HD), Prior Lung Abscess (Pseudomonas / Morgenella), HTN, Chronic Systolic CHF, Chronic Pain ( Narc Dependant), 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    Admitted 8/24/2023 after being found unresponsive at home.  Suspected Narcotic OD.  Intubated emergently in ED for Obtundation.  Noted to be profoundly Hypoglycemic.  Patient unresponsive on Vent on exam and unable to assist with HPI or ROS. Pt was found to have kleb oxytoca bacteremia, likely from aspiration pna. Pt started on ceftriaxone.   Pt extubated 8/25 to NC and was dialyzed. Pt now HD stable, satting well on NC, and stable for downgrade to regular medical floor       OBJECTIVE:  ===========    Vital Signs Last 24 Hrs  T(C): 36.9 (30 Aug 2023 05:49), Max: 37 (29 Aug 2023 17:23)  T(F): 98.5 (30 Aug 2023 05:49), Max: 98.6 (29 Aug 2023 17:23)  HR: 104 (30 Aug 2023 05:49) (92 - 104)  BP: 143/82 (30 Aug 2023 05:49) (143/82 - 163/81)  RR: 16 (30 Aug 2023 05:49) (16 - 18)  SpO2: 100% (30 Aug 2023 05:49) (97% - 100%)    O2 Parameters below as of 30 Aug 2023 05:49  Patient On (Oxygen Delivery Method): room air      08-29 @ 07:01  -  08-30 @ 07:00  --------------------------------------------------------  IN: 550 mL / OUT: 0 mL / NET: 550 mL      CAPILLARY BLOOD GLUCOSE  POCT Blood Glucose.: 127 mg/dL (29 Aug 2023 21:08)      PHYSICAL EXAM:  ==============  General: thin, cachectic, awake alert complains of pain "all over" this AM   Respiratory: CTA B/L No wheezing/rales/ rhonchi  Cardiovascular: S1S2 RRR   Abdomen: soft + BS  Extremities: Lower extremities thin, Right Upper extremity 2+ edema, LUE ANG +/+  Skin: Intact   Neurological: No deficits   Psychiatry: appropriate     HOSPITAL MEDICATIONS:  ======================  apixaban 5 milliGRAM(s) Enteral Tube two times a day  cefTRIAXone   IVPB 2000 milliGRAM(s) IV Intermittent every 24 hours  oxyCODONE    IR 5 milliGRAM(s) Oral every 6 hours PRN  pancrelipase  (CREON  6,000 Lipase Units) 1 Capsule(s) Oral three times a day with meals  pantoprazole    Tablet 40 milliGRAM(s) Oral before breakfast  benzocaine/menthol Lozenge 1 Lozenge Oral four times a day PRN  chlorhexidine 2% Cloths 1 Application(s) Topical <User Schedule>      LABS:  =====                        9.5    5.98  )-----------( 47       ( 28 Aug 2023 09:55 )             27.8     Hgb Trend: 9.5<--, 9.4<--, 10.5<--, 9.6<--, 11.2<--  08-28    140       |  104  |  30<H>  ----------------------------<  125<H>  3.0<L>   |  28  |  2.29<H>    Ca    7.3<L>      28 Aug 2023 15:15  Phos  3.1     08-28  Mg     2.1     08-28    TPro  4.7<L>  /  Alb  1.6<L>  /  TBili  0.3  /  DBili  x   /  AST  230<H>  /  ALT  446<H>  /  AlkPhos  478<H>  08-28    Creatinine Trend: 2.29<--, 4.69<--, 3.82<--, 2.72<--, 3.92<--, 3.81<--    Lactate, Blood: 2.8  Lactate, Blood: 2.7  Lactate, Blood: 1.5        MICROBIOLOGY:     Culture - Blood (collected 08-26-23 @ 10:10)  Source: .Blood Blood-Peripheral  Preliminary Report (08-29-23 @ 16:00):    No growth at 72 Hours    Culture - Urine (collected 08-25-23 @ 02:00)  Source: Clean Catch Clean Catch (Midstream)  Final Report (08-26-23 @ 06:56):    No growth    Culture - Blood (collected 08-24-23 @ 19:50)  Source: .Blood Blood-Peripheral  Gram Stain (08-27-23 @ 07:47):    Growth in aerobic bottle: Gram Negative Rods  Final Report (08-27-23 @ 07:47):    Growth in aerobic bottle: Klebsiella oxytoca/Raoultella ornithinolytica    Direct identification is available within approximately 3-5    hours either by Blood Panel Multiplexed PCR or Direct    MALDI-TOF. Details: [email or web address]  Organism: Blood Culture PCR  Klebsiella oxytoca /Raoutella ornithinolytica (08-27-23 @ 07:47)  Organism: Klebsiella oxytoca /Raoutella ornithinolytica (08-27-23 @ 07:47)      Method Type: TABITHA      -  Amikacin: S <=16      -  Ampicillin: R >16 These ampicillin results predict results for amoxicillin      -  Ampicillin/Sulbactam: S 8/4      -  Aztreonam: S <=4      -  Cefazolin: R 8      -  Cefepime: SDD 4 The breakpoint for susceptible dose dependent may require the usage of a higher cefepime dose (equivalent to adult dose of 2g q8h for normal renal function) for successful treatment.      -  Cefoxitin: S <=8      -  Ceftriaxone: S <=1      -  Ciprofloxacin: S <=0.25      -  Ertapenem: S <=0.5      -  Gentamicin: S <=2      -  Imipenem: S <=1      -  Levofloxacin: S <=0.5      -  Meropenem: S <=1      -  Piperacillin/Tazobactam: S <=8      -  Tobramycin: S <=2      -  Trimethoprim/Sulfamethoxazole: S 1/19  Organism: Blood Culture PCR (08-27-23 @ 07:47)      Method Type: PCR      -  Klebsiella oxytoca: Detec    Culture - Blood (collected 08-24-23 @ 19:50)  Source: .Blood Blood-Peripheral  Final Report (08-30-23 @ 02:01):    No growth at 5 days    cefTRIAXone   IVPB: 100,  100,  100,  100  piperacillin/tazobactam IVPB..: 25,  25,  25,  25,  25  piperacillin/tazobactam IVPB...: 200    RADIOLOGY  ==========   US Duplex Venous Upper Ext Ltd, Right (08.27.23 @ 11:30) >  IMPRESSION:  No evidence of right upper extremity deep venous thrombosis.    US Abdomen Doppler (08.27.23 @ 11:30) >  FINDINGS:  Liver: Normal in size and echogenicity. No focal lesions are identified.   No ultrasound evidence of cirrhosis.    The main portal vein, right and left portal vein, hepatic veins and   hepatic artery are patent with normal directional flow. There is no   evidence of pleural vein thrombosis.  Bile ducts: Normal caliber. Common bile duct measures 7 mm.  Gallbladder: The gallbladder is contracted limiting its evaluation. No   stones identified. There is no sonographic Walter sign.  Pancreas: Poorly visualized.  Spleen: 8.8 cm. Within normal limits.  Right kidney: 5.1 cm. No hydronephrosis. Atrophic.  Left kidney: 4.9 cm. No hydronephrosis. Atrophic. There is 1.7 cm simple   appearing cyst in the upper pole.  Ascites: Small amount of ascites.  Aorta and IVC: Visualized portions are within normal limits.    IMPRESSION:  No evidence of cirrhosis or portal vein thrombosis.  Bilateral atrophic kidneys.    CT Head No Cont (08.24.23 @ 21:15) >  IMPRESSION:  CT head: No evidence of acute transcortical infarct, acute intracranial   hemorrhage or mass effect.  CT cervical spine: No acute fracture or traumatic subluxation.    CT Abdomen and Pelvis No Cont (08.24.23 @ 21:19) >  IMPRESSION:  Bilateral pneumonia. Fluid layering in the large airways and distribution   of pneumonia suggests aspiration pneumonia. This is new compared to the   previous studies.    No other acute finding.    Interval decrease in size of cavitary lesion in the upper lobe of the   right lung, probably chronic or resolving cavitary pneumonia.    Sequela of chronic pancreatitis with biliary ductal dilation and a small   amount of air in adjacent to the pancreatic parenchyma. The amount of air   has decreased compared to 7/13/2023.    Renal atrophy.    Cachexia.    PULMONARY:  ============  - extubated 8/25  - now on room air     CARDIAC:  ========  -NO echo on this admission

## 2023-08-30 NOTE — PROGRESS NOTE ADULT - NS ATTEND AMEND GEN_ALL_CORE FT
pt seen and examined with NP    pt without respiratory complaints  complaining of total body pain  states "I've been here for 3 days and I will be better soon if they only gave me dilaudid every 6 hours for 3 days and then they can change to percocet after that"  no SOB  no cough  afebrile  requesting also "triple portions" entree       62M PMH Alcohol Abuse, Chronic Pancreatitis, HCV (s/p Tx with SVR), COPD, Esophageal Stricture (s/p Stenting 4/22 with subsequent removal 12/16/2022), DVT (on Eliquis), ESRD (on HD, L AVF), Prior Lung Abscess (Pseudomonas / Morgenella), HTN, Chronic Systolic CHF, Chronic Pain (Narc Dependant), 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  Admitted 8/24/2023 after being found unresponsive at home. Intubated in ED for obtundation/airway protection. U tox + opiates and benzo. Admitted to ICU on mechanical vent support. Weaned and extubated 8/25. Blood cx grew Klebsiella oxytoca/Raoultella ornithinolytica.    DX: acute respiratory failure with hypoxia requiring intubation, PNA/aspiration PNA, klebsiella oxytoca bacteremia, acute metabolic encephalopathy, cavitary lung lesion/lung abscess, ESRD on HD, chronic pain, malnutrition    - remains on room air, oxygenating well on RA  - maintaining O2 sat > 90%  - no respiratory complaints, reports feeling well from breathing aspect  - PNA and bacteremia treated with zosyn (x3 days), now de-escalated to ceftriaxone (day #4)  - would aim to complete a total of 7 day course of antibx for PNA and bacteremia, today is day #7 of antibx total, follow up with ID  - metabolic encephalopathy in setting of opiate use ?overdose vs underlying infection, now back to baseline mental status. pt denies opiate overdose  - CT reviewed and with RUL cavitary lesion / lung abscess decreased in size, emphysema, new LLL and RLL opacities, layering fluid within R mainstem and bronchus intermedius   - maintenance HD per nephrology: pt refusing HD today unless he gets dilaudid   - physical therapy  - social work eval: ?home services to be set up for patient  - pt declining rehab   - reconsult pulmonary as needed pt seen and examined with NP    pt without respiratory complaints  complaining of total body pain  states "I've been here for 3 days and I will be better soon if they only gave me dilaudid every 6 hours for 3 days and then they can change to percocet after that"  no SOB  no cough  afebrile  requesting also "triple portions" entree       62M PMH Alcohol Abuse, Chronic Pancreatitis, HCV (s/p Tx with SVR), COPD, Esophageal Stricture (s/p Stenting 4/22 with subsequent removal 12/16/2022), DVT (on Eliquis), ESRD (on HD, L AVF), Prior Lung Abscess (Pseudomonas / Morgenella), HTN, Chronic Systolic CHF, Chronic Pain (Narc Dependant), 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  Admitted 8/24/2023 after being found unresponsive at home. Intubated in ED for obtundation/airway protection. U tox + opiates and benzo. Admitted to ICU on mechanical vent support. Weaned and extubated 8/25. Blood cx grew Klebsiella oxytoca/Raoultella ornithinolytica.    DX: acute respiratory failure with hypoxia requiring intubation, PNA/aspiration PNA, klebsiella oxytoca bacteremia, acute metabolic encephalopathy, cavitary lung lesion/lung abscess, ESRD on HD, chronic pain, malnutrition    - remains on room air, oxygenating well on RA  - maintaining O2 sat > 90%  - no respiratory complaints, reports feeling well from breathing aspect  - PNA and bacteremia treated with zosyn (x3 days), now de-escalated to ceftriaxone (day #4)  - would aim to complete a total of 7 day course of antibx for PNA and bacteremia, today is day #7 of antibx total, follow up with ID  - albuterol bronchodilator prn SOB/wheeze for underlying emphysema   - metabolic encephalopathy in setting of opiate use ?overdose vs underlying infection, now back to baseline mental status. pt denies opiate overdose  - CT reviewed and with RUL cavitary lesion / lung abscess decreased in size, emphysema, new LLL and RLL opacities, layering fluid within R mainstem and bronchus intermedius   - maintenance HD per nephrology: pt refusing HD today unless he gets dilaudid   - physical therapy  - social work eval: ?home services to be set up for patient  - pt declining rehab   - reconsult pulmonary as needed

## 2023-08-30 NOTE — CHART NOTE - NSCHARTNOTEFT_GEN_A_CORE
istop      This report was requested by: Karmen Panchal | Reference #: [id number]    Practitioner Count: 2  Pharmacy Count: 1  Current Opioid Prescriptions: 0  Current Benzodiazepine Prescriptions: 0  Current Stimulant Prescriptions: 0      Patient Demographic Information (PDI)       PDI	First Name	Last Name	Birth Date	Gender	Street Address	[facility]  A	Eitan Chaudhari	1960	Male	[address]	[facility]	[address]  B	Eitan Chaudhari	1960	Male	[address] [facility]	[id number]    Prescription Information      PDI Filter:    PDI	My Rx	Current Rx	Drug Type	Rx Written	Rx Dispensed	Drug	Quantity	Days Supply	Prescriber Name	Prescriber AUDRA #	Payment Method	Dispenser  A	N	N	O	08/05/2023	08/07/2023	oxycodone-acetaminophen 5-325 mg tab	240	20	Hector Odom MD	[id number]	Medicaid	[facility]	N	N	O	07/19/2023	07/22/2023	oxycodone-acetaminophen 5-325 mg tablet	198	16	Hector Odom MD	[id number]	Medicaid	[facility]	N	N	O	06/30/2023	06/30/2023	oxycodone-acetaminophen 5-325 mg tablet	240	20	Hector Odom MD	[id number]	Medicaid	[facility]	N	N	O	06/08/2023	06/14/2023	oxycodone-acetaminophen 5-325 mg tab	240	20	Lamberto Hurtado	[id number]	Medicaid	[facility]	N	N	O	05/16/2023	05/19/2023	oxycodone hcl (ir) 5 mg tablet	336	28	Lamberto Hurtado	[id number]	Medicaid	[facility]	N	N	O	05/04/2023	05/05/2023	oxycodone hcl (ir) 5 mg tablet	168	14	Lamberto [id number]	Medicaid	[facility]	N	N	O	10/06/2022	10/11/2022	oxycodone hcl (ir) 5 mg tablet	336	28	Lamberto [id number]	Medicaid	[facility]	N	N	O	09/10/2022	09/14/2022	oxycodone hcl (ir) 5 mg tablet	336	28	Lamberto Hurtado	[id number]	Medicaid	[id number]  B	N	N	O	03/18/2023	03/18/2023	oxycodone hcl (ir) 10 mg tab	180	30	Gene Enriquez MD	[id number]	Insurance	[facility]  B	N	N	O	03/13/2023	03/13/2023	oxycodone hcl (ir) 10 mg tab	30	5	Gene Enriquez MD	[id number]	Insurance	[facility]    * - Details of Drug Type : O = Opioid, B = Benzodiazepine, S = Stimulant

## 2023-08-30 NOTE — PROGRESS NOTE ADULT - SUBJECTIVE AND OBJECTIVE BOX
[facility] NEPHROLOGY SERVICES, [facility]  NEPHROLOGY AND HYPERTENSION  [address]  [address]  [place] [address]  [phone]    MD HALIE OLIVIA, MD BILLIE DEAN MD CHRISTOPHER CAPUTO, MD EDWARD BOVER, MD          Patient events noted    MEDICATIONS  (STANDING):  apixaban 5 milliGRAM(s) Enteral Tube two times a day  cefTRIAXone   IVPB 2000 milliGRAM(s) IV Intermittent every 24 hours  chlorhexidine 2% Cloths 1 Application(s) Topical <User Schedule>  pancrelipase  (CREON  6,000 Lipase Units) 1 Capsule(s) Oral three times a day with meals  pantoprazole    Tablet 40 milliGRAM(s) Oral before breakfast    MEDICATIONS  (PRN):  benzocaine/menthol Lozenge 1 Lozenge Oral four times a day PRN Sore Throat  oxyCODONE    IR 10 milliGRAM(s) Oral every 4 hours PRN Severe Pain (7 - 10)      08-29-23 @ 07:01  -  08-30-23 @ 07:00  --------------------------------------------------------  IN: 550 mL / OUT: 0 mL / NET: 550 mL    08-30-23 @ 07:01  -  08-30-23 @ 19:00  --------------------------------------------------------  IN: 360 mL / OUT: 0 mL / NET: 360 mL      PHYSICAL EXAM:      T(C): 36.6 (08-30-23 @ 17:45), Max: 37.1 (08-30-23 @ 12:09)  HR: 102 (08-30-23 @ 17:45) (101 - 104)  BP: 155/83 (08-30-23 @ 17:45) (143/82 - 155/83)  RR: 17 (08-30-23 @ 17:45) (16 - 17)  SpO2: 98% (08-30-23 @ 17:45) (98% - 100%)  Wt(kg): --  Lungs clear  Heart S1S2  Abd soft NT ND  Extremities:   tr edema                          Creatinine Trend: 2.29<--, 4.69<--, 3.82<--, 2.72<--, 3.92<--, 3.81<--      Assessment   ESRD, maintenance   Refusing HD    Plan:  HD for tomorrow   Will follow     Austin Dukes MD

## 2023-08-31 NOTE — CONSULT NOTE ADULT - SUBJECTIVE AND OBJECTIVE BOX
incomplete       Hematology Consult Note    HPI:  62 year old male PMHx Alcohol Abuse, Chronic Pancreatitis, HCV (s/p Tx with SVR), COPD, Esophageal Stricture (s/p Stenting 4/22 with subsequest removal 12/16/2022), DVT ( on Eliquis), ESRD ( on HD), Prior Lung Abscess (Pseudomonas / Morgenella), HTN, Chronic Systolic CHF, Chronic Pain ( Narc Dependant), 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  Admitted 8/24/2023 after being found unresponsive at home.  Suspected Narcotic OD.  Intubated emergently in ED for Obtundation.  Noted to be profoundly Hypoglycemic.  Patient unresponsive on Vent on exam and unable to assist with HPI or ROS.  (24 Aug 2023 23:29)      Allergies    Tylenol (Other)    Intolerances        MEDICATIONS  (STANDING):  apixaban 5 milliGRAM(s) Enteral Tube two times a day  cefTRIAXone   IVPB 2000 milliGRAM(s) IV Intermittent once  chlorhexidine 2% Cloths 1 Application(s) Topical <User Schedule>  pancrelipase  (CREON  6,000 Lipase Units) 1 Capsule(s) Oral three times a day with meals  pantoprazole    Tablet 40 milliGRAM(s) Oral before breakfast    MEDICATIONS  (PRN):  benzocaine/menthol Lozenge 1 Lozenge Oral four times a day PRN Sore Throat  oxyCODONE    IR 10 milliGRAM(s) Oral every 4 hours PRN Severe Pain (7 - 10)      PAST MEDICAL & SURGICAL HISTORY:  ETOH abuse  sober x1 year approximately      Hepatitis C  treated      Gout      HTN (hypertension)      H/O chronic pancreatitis      Wound of right foot      ESRD on hemodialysis      DVT (deep venous thrombosis)      Gastric perforation          FAMILY HISTORY:  FH: HTN (hypertension)        SOCIAL HISTORY: No EtOH, no tobacco    REVIEW OF SYSTEMS:    CONSTITUTIONAL: No weakness, fevers or chills  EYES/ENT: No visual changes;  No vertigo or throat pain   NECK: No pain or stiffness  RESPIRATORY: No cough, wheezing, hemoptysis; No shortness of breath  CARDIOVASCULAR: No chest pain or palpitations  GASTROINTESTINAL: No abdominal or epigastric pain. No nausea, vomiting, or hematemesis; No diarrhea or constipation. No melena or hematochezia.  GENITOURINARY: No dysuria, frequency or hematuria  NEUROLOGICAL: No numbness or weakness  SKIN: No itching, burning, rashes, or lesions   All other review of systems is negative unless indicated above.        T(F): 97.9 (08-31-23 @ 10:45), Max: 98.2 (08-31-23 @ 00:00)  HR: 93 (08-31-23 @ 10:45)  BP: 136/78 (08-31-23 @ 10:45)  RR: 18 (08-31-23 @ 10:45)  SpO2: 99% (08-31-23 @ 10:45)  Wt(kg): --    GENERAL: NAD, well-developed  HEAD:  Atraumatic, Normocephalic  EYES: EOMI, PERRLA, conjunctiva and sclera clear  NECK: Supple, No JVD  CHEST/LUNG: Clear to auscultation bilaterally; No wheeze  HEART: Regular rate and rhythm; No murmurs, rubs, or gallops  ABDOMEN: Soft, Nontender, Nondistended; Bowel sounds present  EXTREMITIES:  2+ Peripheral Pulses, No clubbing, cyanosis, or edema  NEUROLOGY: non-focal  SKIN: No rashes or lesions                          8.1    6.43  )-----------( 68       ( 31 Aug 2023 09:10 )             24.1       08-31    147<H>  |  112<H>  |  65<H>  ----------------------------<  105<H>  2.6<LL>   |  24  |  5.44<H>    Ca    7.5<L>      31 Aug 2023 09:10  Phos  2.8     08-31  Mg     1.9     08-31    TPro  5.0<L>  /  Alb  1.6<L>  /  TBili  0.2  /  DBili  x   /  AST  58<H>  /  ALT  233<H>  /  AlkPhos  390<H>  08-31      Magnesium: 1.9 mg/dL (08-31 @ 09:10)  Phosphorus: 2.8 mg/dL (08-31 @ 09:10)      < from: CT Head No Cont (08.24.23 @ 21:15) >  ACC: [id number] EXAM:  CT CERVICAL SPINE   ORDERED BY: EM CARRIZALES     ACC: [id number] EXAM:  CT BRAIN   ORDERED BY: EM CARRIZALES     PROCEDURE DATE:  08/24/2023          INTERPRETATION:  CLINICAL INDICATION: Altered mental status. Found   unresponsive. History of opiate abuse.    CT BRAIN:    TECHNIQUE:  Multiple contiguous axial images were obtained from the skull   base to the vertex without the use of intravenous contrast. Reformatted   coronal and sagittal images were subsequently obtained and reviewed.    COMPARISON EXAMINATION: 1/2/2023    FINDINGS:  There is no CT evidence of acute transcortical infarct. Age-related   involutional changes and chronic microvascular ischemic changes. Chronic   left basal ganglia lacunar infarct. Small chronic infarct involving the   superior left posterior frontal lobe, new since prior study.    There is no hydrocephalus, mass effect, midline shift, or acute   intracranial hemorrhage. No extra-axial collection. Basal cisterns are   patent.    The visualized paranasal sinuses and mastoid air cells are clear.    The calvarium is intact.      CT CERVICAL SPINE:    TECHNIQUE:  Axial images were obtained through the cervical spine using   multislice helical technique.  Reformatted coronal and sagittal images   were subsequently obtained and reviewed.    COMPARISON EXAMINATION:  None.    FINDINGS:  There is no prevertebral soft tissue swelling. There is no splaying of   the spinous processes. The occipital condyles are normal. Lateral masses   of C1 align normally with C2. The atlantodental and atlanto-occipital   joints are maintained. No lucent fracture line is identified. There is no   spondylolisthesis. Facet joint alignments are maintained.    Multilevel degenerative osteoarthritis and degenerative disc disease are   present. Findings include marginal osteophytes, uncovertebral spurring,   loss of normal disc space height and endplate sclerosis, and facet joint   space compartment narrowing with subchondral sclerosis and hypertrophic   osteophytes at multiple levels. Canal contents are suboptimally evaluated   inherent to CT technique.      IMPRESSION:  CT head: No evidence of acute transcortical infarct, acute intracranial   hemorrhage or mass effect.    CT cervical spine: No acute fracture or traumatic subluxation.    --- End of Report ---        < from: CT Abdomen and Pelvis No Cont (08.24.23 @ 21:19) >  ACC: [id number] EXAM:  CT CHEST   ORDERED BY: EM CARRIZALES     ACC: [id number] EXAM:  CT ABDOMEN AND PELVIS   ORDERED BY: EM CARRIZALES     PROCEDURE DATE:  08/24/2023          INTERPRETATION:  CLINICAL INFORMATION: Altered mental status. History of   opiate abuse and end-stage renal disease.    COMPARISON: 7/13/2023 CT abdomen pelvis. 11/25/2022 CT chest.    CONTRAST/COMPLICATIONS:  IV Contrast: None.  Oral Contrast: None.  Complications: None.    PROCEDURE:  CT of the Chest, Abdomen and Pelvis was performed.  Sagittal and coronal reformats were performed.    FINDINGS:  CHEST:  LUNGS AND LARGE AIRWAYS: Endotracheal tube tip proximal thoracic trachea.   Cavitary lesion posterior upper aspect right upper lobe abutting the   pleural 2.9 cm diameter decreased from 4.6 cm on 11/25/2022. Numerous   reticular nodular and groundglass opacities extending along the   bronchovascular bundles in the right middle lobe, left lower lobe,   lingula, and right lower lobe, new. No pneumothorax. Fluid layers in the   right main bronchus, bronchus intermedius, and lower lobe bronchus.   Underlying mild emphysema.  PLEURA: No pleural effusion.  VESSELS: No thoracic aortic aneurysm. Atherosclerosis including of the   coronary arteries. Low density of the patient's blood compatible with   anemia.  HEART: Heart size is normal. No pericardial effusion.  MEDIASTINUM AND LAKE: No lymphadenopathy.  CHEST WALL AND LOWER NECK: No acute finding. Cachexia with very little   subcutaneous fat.    ABDOMEN AND PELVIS:  LIVER: Within normal limits.  BILE DUCTS: Biliary ductal dilation similar to prior.  GALLBLADDER: Distended. No visible gallstones.  SPLEEN: Within normal limits.  PANCREAS: A large number of calcifications replace much of the pancreatic   parenchyma. No peripancreatic fluid collection. Small foci of air   adjacent to the pancreatic parenchyma decreased compared to 7/13/2023.  ADRENALS: Within normal limits.  KIDNEYS/URETERS: Atrophic kidneys with small cysts. No hydronephrosis.    BLADDER: Decompressed with Kenney catheter.  REPRODUCTIVE ORGANS: Normal-sized prostate.    BOWEL: No bowel obstruction. Appendix not visualized, no ancillary   findings appendicitis.  PERITONEUM: No free air, no extraluminal air or abscess. Diffuse   mesenteric edema.No obvious ascites.  VESSELS: No abdominal aortic aneurysm. Calcific atherosclerosis.  RETROPERITONEUM/LYMPH NODES: No lymphadenopathy.  ABDOMINAL WALL: No acute finding.  BONES: No fracture or aggressive osseous lesions. Mild degenerative   changes.    IMPRESSION:  Bilateral pneumonia. Fluid layering in the large airways and distribution   of pneumonia suggests aspiration pneumonia. This is new compared to the   previous studies.    No other acute finding.    Interval decrease in size of cavitary lesion in the upper lobe of the   right lung, probably chronic or resolving cavitary pneumonia.    Sequela of chronic pancreatitis with biliary ductal dilation and a small   amount of air in adjacent to the pancreatic parenchyma. The amount of air   has decreased compared to 7/13/2023.    Renal atrophy.    Cachexia.      --- End of Report ---          < from: US Abdomen Doppler (08.27.23 @ 11:30) >  ACC: [id number] EXAM:  US DPLX ABDOMEN   ORDERED BY: GEETHA OHARA     PROCEDURE DATE:  08/27/2023          INTERPRETATION:  CLINICAL INFORMATION: End-stage renal disease on   dialysis. Evaluate for cirrhosis or portal vein thrombosis.    COMPARISON: Abdominal MRI dated 04/20/2022, renal ultrasound dated   12/19/2022 and abdominal CT dated 08/24/2023.    TECHNIQUE: Sonography of the abdomen.    FINDINGS:  Liver: Normal in size and echogenicity. No focal lesions are identified.   No ultrasound evidence of cirrhosis.    The main portal vein, right and left portal vein, hepatic veins and   hepatic artery are patent with normal directional flow. There is no   evidence of pleural vein thrombosis.  Bile ducts: Normal caliber. Common bile duct measures 7 mm.  Gallbladder: The gallbladder is contracted limiting its evaluation. No   stones identified. There is no sonographic Walter sign.  Pancreas: Poorly visualized.  Spleen: 8.8 cm. Within normal limits.  Right kidney: 5.1 cm. No hydronephrosis. Atrophic.  Left kidney: 4.9 cm. No hydronephrosis. Atrophic. There is 1.7 cm simple   appearing cyst in the upper pole.  Ascites: Small amount of ascites.  Aorta and IVC: Visualized portions are within normal limits.    IMPRESSION:  No evidence of cirrhosis or portal vein thrombosis.    Bilateral atrophic kidneys.        --- End of Report ---                     Hematology Consult Note    HPI:  62 year old male PMHx Alcohol Abuse, Chronic Pancreatitis, HCV (s/p Tx with SVR), COPD, Esophageal Stricture (s/p Stenting 4/22 with subsequest removal 12/16/2022), DVT ( on Eliquis), ESRD ( on HD), Prior Lung Abscess (Pseudomonas / Morgenella), HTN, Chronic Systolic CHF, Chronic Pain ( Narc Dependant), 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  Admitted 8/24/2023 after being found unresponsive at home.  Suspected Narcotic OD.  Intubated emergently in ED for Obtundation.  Noted to be profoundly Hypoglycemic.  Patient unresponsive on Vent on exam and unable to assist with HPI or ROS.  (24 Aug 2023 23:29)      Allergies    Tylenol (Other)    Intolerances        MEDICATIONS  (STANDING):  apixaban 5 milliGRAM(s) Enteral Tube two times a day  cefTRIAXone   IVPB 2000 milliGRAM(s) IV Intermittent once  chlorhexidine 2% Cloths 1 Application(s) Topical <User Schedule>  pancrelipase  (CREON  6,000 Lipase Units) 1 Capsule(s) Oral three times a day with meals  pantoprazole    Tablet 40 milliGRAM(s) Oral before breakfast    MEDICATIONS  (PRN):  benzocaine/menthol Lozenge 1 Lozenge Oral four times a day PRN Sore Throat  oxyCODONE    IR 10 milliGRAM(s) Oral every 4 hours PRN Severe Pain (7 - 10)      PAST MEDICAL & SURGICAL HISTORY:  ETOH abuse  sober x1 year approximately      Hepatitis C  treated      Gout      HTN (hypertension)      H/O chronic pancreatitis      Wound of right foot      ESRD on hemodialysis      DVT (deep venous thrombosis)      Gastric perforation          FAMILY HISTORY:  FH: HTN (hypertension)        SOCIAL HISTORY: No EtOH, no tobacco    REVIEW OF SYSTEMS:    CONSTITUTIONAL: c/o weakness and weight loss  EYES/ENT: No visual changes;  No vertigo or throat pain   NECK: No pain or stiffness  RESPIRATORY: SHAH  CARDIOVASCULAR: No chest pain or palpitations  GASTROINTESTINAL: No abdominal or epigastric pain. No nausea, vomiting, or hematemesis; No diarrhea or constipation. No melena or hematochezia.  GENITOURINARY: No dysuria, frequency or hematuria  NEUROLOGICAL: No numbness or weakness  SKIN: No itching, burning, rashes, or lesions   All other review of systems is negative unless indicated above.        T(F): 97.9 (08-31-23 @ 10:45), Max: 98.2 (08-31-23 @ 00:00)  HR: 93 (08-31-23 @ 10:45)  BP: 136/78 (08-31-23 @ 10:45)  RR: 18 (08-31-23 @ 10:45)  SpO2: 99% (08-31-23 @ 10:45)  Wt(kg): --    GENERAL: NAD, cachetic male   HEAD:  Atraumatic, Normocephalic  EYES: EOMI, PERRLA, conjunctiva and sclera clear  NECK: Supple, No JVD  CHEST/LUNG: diminished b/l   HEART: Regular rate and rhythm; No murmurs, rubs, or gallops  ABDOMEN: Soft, Nontender, Nondistended; Bowel sounds present  EXTREMITIES:  2+ Peripheral Pulses, No clubbing, cyanosis, or edema  NEUROLOGY: non-focal  SKIN: No rashes or lesions                          8.1    6.43  )-----------( 68       ( 31 Aug 2023 09:10 )             24.1       08-31    147<H>  |  112<H>  |  65<H>  ----------------------------<  105<H>  2.6<LL>   |  24  |  5.44<H>    Ca    7.5<L>      31 Aug 2023 09:10  Phos  2.8     08-31  Mg     1.9     08-31    TPro  5.0<L>  /  Alb  1.6<L>  /  TBili  0.2  /  DBili  x   /  AST  58<H>  /  ALT  233<H>  /  AlkPhos  390<H>  08-31      Magnesium: 1.9 mg/dL (08-31 @ 09:10)  Phosphorus: 2.8 mg/dL (08-31 @ 09:10)      < from: CT Head No Cont (08.24.23 @ 21:15) >  ACC: [id number] EXAM:  CT CERVICAL SPINE   ORDERED BY: EM CARRIZALES     ACC: [id number] EXAM:  CT BRAIN   ORDERED BY: EM CARRIZALES     PROCEDURE DATE:  08/24/2023          INTERPRETATION:  CLINICAL INDICATION: Altered mental status. Found   unresponsive. History of opiate abuse.    CT BRAIN:    TECHNIQUE:  Multiple contiguous axial images were obtained from the skull   base to the vertex without the use of intravenous contrast. Reformatted   coronal and sagittal images were subsequently obtained and reviewed.    COMPARISON EXAMINATION: 1/2/2023    FINDINGS:  There is no CT evidence of acute transcortical infarct. Age-related   involutional changes and chronic microvascular ischemic changes. Chronic   left basal ganglia lacunar infarct. Small chronic infarct involving the   superior left posterior frontal lobe, new since prior study.    There is no hydrocephalus, mass effect, midline shift, or acute   intracranial hemorrhage. No extra-axial collection. Basal cisterns are   patent.    The visualized paranasal sinuses and mastoid air cells are clear.    The calvarium is intact.      CT CERVICAL SPINE:    TECHNIQUE:  Axial images were obtained through the cervical spine using   multislice helical technique.  Reformatted coronal and sagittal images   were subsequently obtained and reviewed.    COMPARISON EXAMINATION:  None.    FINDINGS:  There is no prevertebral soft tissue swelling. There is no splaying of   the spinous processes. The occipital condyles are normal. Lateral masses   of C1 align normally with C2. The atlantodental and atlanto-occipital   joints are maintained. No lucent fracture line is identified. There is no   spondylolisthesis. Facet joint alignments are maintained.    Multilevel degenerative osteoarthritis and degenerative disc disease are   present. Findings include marginal osteophytes, uncovertebral spurring,   loss of normal disc space height and endplate sclerosis, and facet joint   space compartment narrowing with subchondral sclerosis and hypertrophic   osteophytes at multiple levels. Canal contents are suboptimally evaluated   inherent to CT technique.      IMPRESSION:  CT head: No evidence of acute transcortical infarct, acute intracranial   hemorrhage or mass effect.    CT cervical spine: No acute fracture or traumatic subluxation.    --- End of Report ---        < from: CT Abdomen and Pelvis No Cont (08.24.23 @ 21:19) >  ACC: [id number] EXAM:  CT CHEST   ORDERED BY: EM CARRIZALES     ACC: [id number] EXAM:  CT ABDOMEN AND PELVIS   ORDERED BY: EM CARRIZALES     PROCEDURE DATE:  08/24/2023          INTERPRETATION:  CLINICAL INFORMATION: Altered mental status. History of   opiate abuse and end-stage renal disease.    COMPARISON: 7/13/2023 CT abdomen pelvis. 11/25/2022 CT chest.    CONTRAST/COMPLICATIONS:  IV Contrast: None.  Oral Contrast: None.  Complications: None.    PROCEDURE:  CT of the Chest, Abdomen and Pelvis was performed.  Sagittal and coronal reformats were performed.    FINDINGS:  CHEST:  LUNGS AND LARGE AIRWAYS: Endotracheal tube tip proximal thoracic trachea.   Cavitary lesion posterior upper aspect right upper lobe abutting the   pleural 2.9 cm diameter decreased from 4.6 cm on 11/25/2022. Numerous   reticular nodular and groundglass opacities extending along the   bronchovascular bundles in the right middle lobe, left lower lobe,   lingula, and right lower lobe, new. No pneumothorax. Fluid layers in the   right main bronchus, bronchus intermedius, and lower lobe bronchus.   Underlying mild emphysema.  PLEURA: No pleural effusion.  VESSELS: No thoracic aortic aneurysm. Atherosclerosis including of the   coronary arteries. Low density of the patient's blood compatible with   anemia.  HEART: Heart size is normal. No pericardial effusion.  MEDIASTINUM AND LAKE: No lymphadenopathy.  CHEST WALL AND LOWER NECK: No acute finding. Cachexia with very little   subcutaneous fat.    ABDOMEN AND PELVIS:  LIVER: Within normal limits.  BILE DUCTS: Biliary ductal dilation similar to prior.  GALLBLADDER: Distended. No visible gallstones.  SPLEEN: Within normal limits.  PANCREAS: A large number of calcifications replace much of the pancreatic   parenchyma. No peripancreatic fluid collection. Small foci of air   adjacent to the pancreatic parenchyma decreased compared to 7/13/2023.  ADRENALS: Within normal limits.  KIDNEYS/URETERS: Atrophic kidneys with small cysts. No hydronephrosis.    BLADDER: Decompressed with Kenney catheter.  REPRODUCTIVE ORGANS: Normal-sized prostate.    BOWEL: No bowel obstruction. Appendix not visualized, no ancillary   findings appendicitis.  PERITONEUM: No free air, no extraluminal air or abscess. Diffuse   mesenteric edema.No obvious ascites.  VESSELS: No abdominal aortic aneurysm. Calcific atherosclerosis.  RETROPERITONEUM/LYMPH NODES: No lymphadenopathy.  ABDOMINAL WALL: No acute finding.  BONES: No fracture or aggressive osseous lesions. Mild degenerative   changes.    IMPRESSION:  Bilateral pneumonia. Fluid layering in the large airways and distribution   of pneumonia suggests aspiration pneumonia. This is new compared to the   previous studies.    No other acute finding.    Interval decrease in size of cavitary lesion in the upper lobe of the   right lung, probably chronic or resolving cavitary pneumonia.    Sequela of chronic pancreatitis with biliary ductal dilation and a small   amount of air in adjacent to the pancreatic parenchyma. The amount of air   has decreased compared to 7/13/2023.    Renal atrophy.    Cachexia.      --- End of Report ---          < from: US Abdomen Doppler (08.27.23 @ 11:30) >  ACC: [id number] EXAM:  US DPLX ABDOMEN   ORDERED BY: GEETHA OHARA     PROCEDURE DATE:  08/27/2023          INTERPRETATION:  CLINICAL INFORMATION: End-stage renal disease on   dialysis. Evaluate for cirrhosis or portal vein thrombosis.    COMPARISON: Abdominal MRI dated 04/20/2022, renal ultrasound dated   12/19/2022 and abdominal CT dated 08/24/2023.    TECHNIQUE: Sonography of the abdomen.    FINDINGS:  Liver: Normal in size and echogenicity. No focal lesions are identified.   No ultrasound evidence of cirrhosis.    The main portal vein, right and left portal vein, hepatic veins and   hepatic artery are patent with normal directional flow. There is no   evidence of pleural vein thrombosis.  Bile ducts: Normal caliber. Common bile duct measures 7 mm.  Gallbladder: The gallbladder is contracted limiting its evaluation. No   stones identified. There is no sonographic Walter sign.  Pancreas: Poorly visualized.  Spleen: 8.8 cm. Within normal limits.  Right kidney: 5.1 cm. No hydronephrosis. Atrophic.  Left kidney: 4.9 cm. No hydronephrosis. Atrophic. There is 1.7 cm simple   appearing cyst in the upper pole.  Ascites: Small amount of ascites.  Aorta and IVC: Visualized portions are within normal limits.    IMPRESSION:  No evidence of cirrhosis or portal vein thrombosis.    Bilateral atrophic kidneys.        --- End of Report ---

## 2023-08-31 NOTE — PROGRESS NOTE ADULT - NS ATTEND OPT1 GEN_ALL_CORE
I independently performed the documented:
I independently performed the documented:
I attest my time as attending is greater than 50% of the total combined time spent on qualifying patient care activities by the PA/NP and attending.
I independently performed the documented:
I attest my time as attending is greater than 50% of the total combined time spent on qualifying patient care activities by the PA/NP and attending.

## 2023-08-31 NOTE — PROGRESS NOTE ADULT - SUBJECTIVE AND OBJECTIVE BOX
NEPHROLOGY PROGRESS NOTE    CHIEF COMPLAINT:  ESRD    HPI:  Seen on dialysis.        EXAM:  Vital Signs Last 24 Hrs  T(C): 36.9 (31 Aug 2023 12:40), Max: 36.9 (31 Aug 2023 12:40)  T(F): 98.4 (31 Aug 2023 12:40), Max: 98.4 (31 Aug 2023 12:40)  HR: 101 (31 Aug 2023 12:40) (93 - 108)  BP: 115/75 (31 Aug 2023 12:40) (115/75 - 159/88)  BP(mean): --  RR: 16 (31 Aug 2023 12:40) (16 - 18)  SpO2: 98% (31 Aug 2023 12:40) (97% - 99%)    Parameters below as of 31 Aug 2023 12:40  Patient On (Oxygen Delivery Method): room air      I&O's Summary    30 Aug 2023 07:01  -  31 Aug 2023 07:00  --------------------------------------------------------  IN: 920 mL / OUT: 0 mL / NET: 920 mL      Daily     Daily Weight in k.5 (31 Aug 2023 12:40)    Conversant, in no apparent distress  Normal respiratory effort, lungs clear bilaterally  Heart RRR with no murmur, no peripheral edema    LABS                        8.1    6.43  )-----------( 68       ( 31 Aug 2023 09:10 )             24.1         147<H>  |  112<H>  |  65<H>  ----------------------------<  105<H>  2.6<LL>   |  24  |  5.44<H>    Ca    7.5<L>      31 Aug 2023 09:10  Phos  2.8       Mg     1.9         TPro  5.0<L>  /  Alb  1.6<L>  /  TBili  0.2  /  DBili  x   /  AST  58<H>  /  ALT  233<H>  /  AlkPhos  390<H>  08-31    Urinalysis Basic - ( 31 Aug 2023 09:10 )  Color: x / Appearance: x / SG: x / pH: x  Gluc: 105 mg/dL / Ketone: x  / Bili: x / Urobili: x   Blood: x / Protein: x / Nitrite: x   Leuk Esterase: x / RBC: x / WBC x   Sq Epi: x / Non Sq Epi: x / Bacteria: x      Assessment   ESRD, maintenance     Plan:  Completed HD against 4K bath due to hypokalemia

## 2023-08-31 NOTE — PROGRESS NOTE ADULT - SUBJECTIVE AND OBJECTIVE BOX
CHIEF COMPLAINT: FU of acute toxic encephalopathy with opioid overdose, sepsis with acute respiratory failure with aspiration pneumonia and klebsiella bacteremia  Patient was seen in HD facility  no fever  no n/v/d/cp/sob      MEDICATIONS  (STANDING):  apixaban 5 milliGRAM(s) Enteral Tube two times a day  cefTRIAXone   IVPB 2000 milliGRAM(s) IV Intermittent once  chlorhexidine 2% Cloths 1 Application(s) Topical <User Schedule>  pancrelipase  (CREON  6,000 Lipase Units) 1 Capsule(s) Oral three times a day with meals  pantoprazole    Tablet 40 milliGRAM(s) Oral before breakfast    MEDICATIONS  (PRN):  benzocaine/menthol Lozenge 1 Lozenge Oral four times a day PRN Sore Throat  oxyCODONE    IR 10 milliGRAM(s) Oral every 4 hours PRN Severe Pain (7 - 10)        Vital Signs Last 24 Hrs  T(C): 36.6 (31 Aug 2023 10:45), Max: 36.8 (31 Aug 2023 00:00)  T(F): 97.9 (31 Aug 2023 10:45), Max: 98.2 (31 Aug 2023 00:00)  HR: 93 (31 Aug 2023 10:45) (93 - 108)  BP: 136/78 (31 Aug 2023 10:45) (136/78 - 159/88)  BP(mean): --  RR: 18 (31 Aug 2023 10:45) (16 - 18)  SpO2: 99% (31 Aug 2023 10:45) (97% - 99%)    Parameters below as of 31 Aug 2023 10:45  Patient On (Oxygen Delivery Method): room air        PHYSICAL EXAM:    GENERAL: Malnourished and on room air  CHEST/LUNG:  No wheezing, no crackles   HEART: Regular rate and rhythm; No murmurs  ABDOMEN: Soft, Nontender, Nondistended; Bowel sounds present  EXTREMITIES:  No clubbing, cyanosis, or +edema to rue   Psychiatry: AAO x 3, mood is appropriate         Labs:                               8.1    6.43  )-----------( 68       ( 31 Aug 2023 09:10 )             24.1   08-31    147<H>  |  112<H>  |  65<H>  ----------------------------<  105<H>  2.6<LL>   |  24  |  5.44<H>    Ca    7.5<L>      31 Aug 2023 09:10  Phos  2.8     08-31  Mg     1.9     08-31    TPro  5.0<L>  /  Alb  1.6<L>  /  TBili  0.2  /  DBili  x   /  AST  58<H>  /  ALT  233<H>  /  AlkPhos  390<H>  08-31      CAPILLARY BLOOD GLUCOSE    Culture - Blood (collected 08-26)  Source: .Blood Blood-Peripheral  Preliminary Report (08-28):    No growth at 48 Hours    Culture - Urine (collected 08-25)  Source: Clean Catch Clean Catch (Midstream)  Final Report (08-26):    No growth    Culture - Blood (collected 08-24)  Source: .Blood Blood-Peripheral  Gram Stain (08-27):    Growth in aerobic bottle: Gram Negative Rods  Final Report (08-27):    Growth in aerobic bottle: Klebsiella oxytoca/Raoultella ornithinolytica    Direct identification is available within approximately 3-5    hours either by Blood Panel Multiplexed PCR or Direct    MALDI-TOF. Details: [email or web address]  Organism: Blood Culture PCR  Klebsiella oxytoca /Raoutella ornithinolytica (08-27)  Organism: Klebsiella oxytoca /Raoutella ornithinolytica (08-27)    Sensitivities:      Method Type: TABITHA      -  Amikacin: S <=16      -  Ampicillin: R >16 These ampicillin results predict results for amoxicillin      -  Ampicillin/Sulbactam: S 8/4      -  Aztreonam: S <=4      -  Cefazolin: R 8      -  Cefepime: SDD 4 The breakpoint for susceptible dose dependent may require the usage of a higher cefepime dose (equivalent to adult dose of 2g q8h for normal renal function) for successful treatment.      -  Cefoxitin: S <=8      -  Ceftriaxone: S <=1      -  Ciprofloxacin: S <=0.25      -  Ertapenem: S <=0.5      -  Gentamicin: S <=2      -  Imipenem: S <=1      -  Levofloxacin: S <=0.5      -  Meropenem: S <=1      -  Piperacillin/Tazobactam: S <=8      -  Tobramycin: S <=2      -  Trimethoprim/Sulfamethoxazole: S 1/19  Organism: Blood Culture PCR (08-27)    Sensitivities:      Method Type: PCR      -  Klebsiella oxytoca: Detec    Culture - Blood (collected 08-24)  Source: .Blood Blood-Peripheral  Preliminary Report (08-29):    No growth at 4 days    MEDICATIONS  (STANDING):  apixaban 5 milliGRAM(s) Enteral Tube two times a day  cefTRIAXone   IVPB 2000 milliGRAM(s) IV Intermittent every 24 hours  chlorhexidine 2% Cloths 1 Application(s) Topical <User Schedule>  pancrelipase  (CREON  6,000 Lipase Units) 1 Capsule(s) Oral three times a day with meals  pantoprazole    Tablet 40 milliGRAM(s) Oral before breakfast    MEDICATIONS  (PRN):  benzocaine/menthol Lozenge 1 Lozenge Oral four times a day PRN Sore Throat  oxyCODONE    IR 5 milliGRAM(s) Oral every 6 hours PRN Moderate Pain (4 - 6)       CHIEF COMPLAINT: FU of acute toxic encephalopathy with opioid overdose, sepsis with acute respiratory failure with aspiration pneumonia and klebsiella bacteremia  Patient was seen in HD facility  no fever  no n/v/d/cp/sob      MEDICATIONS  (STANDING):  apixaban 5 milliGRAM(s) Enteral Tube two times a day  cefTRIAXone   IVPB 2000 milliGRAM(s) IV Intermittent once  chlorhexidine 2% Cloths 1 Application(s) Topical <User Schedule>  pancrelipase  (CREON  6,000 Lipase Units) 1 Capsule(s) Oral three times a day with meals  pantoprazole    Tablet 40 milliGRAM(s) Oral before breakfast    MEDICATIONS  (PRN):  benzocaine/menthol Lozenge 1 Lozenge Oral four times a day PRN Sore Throat  oxyCODONE    IR 10 milliGRAM(s) Oral every 4 hours PRN Severe Pain (7 - 10)        Vital Signs Last 24 Hrs  T(C): 36.6 (31 Aug 2023 10:45), Max: 36.8 (31 Aug 2023 00:00)  T(F): 97.9 (31 Aug 2023 10:45), Max: 98.2 (31 Aug 2023 00:00)  HR: 93 (31 Aug 2023 10:45) (93 - 108)  BP: 136/78 (31 Aug 2023 10:45) (136/78 - 159/88)  BP(mean): --  RR: 18 (31 Aug 2023 10:45) (16 - 18)  SpO2: 99% (31 Aug 2023 10:45) (97% - 99%)    Parameters below as of 31 Aug 2023 10:45  Patient On (Oxygen Delivery Method): room air        PHYSICAL EXAM:    GENERAL: Malnourished and on room air  CHEST/LUNG:  No wheezing, no crackles   HEART: Regular rate and rhythm; No murmurs  ABDOMEN: Soft, Nontender, Nondistended; Bowel sounds present  EXTREMITIES:  No clubbing, cyanosis, or +edema to rue   Psychiatry: AAO x 3, mood is appropriate         Labs:                               8.1    6.43  )-----------( 68       ( 31 Aug 2023 09:10 )             24.1   08-31    147<H>  |  112<H>  |  65<H>  ----------------------------<  105<H>  2.6<LL>   |  24  |  5.44<H>    Ca    7.5<L>      31 Aug 2023 09:10  Phos  2.8     08-31  Mg     1.9     08-31    TPro  5.0<L>  /  Alb  1.6<L>  /  TBili  0.2  /  DBili  x   /  AST  58<H>  /  ALT  233<H>  /  AlkPhos  390<H>  08-31      CAPILLARY BLOOD GLUCOSE    Culture - Blood (collected 08-26)  Source: .Blood Blood-Peripheral  Preliminary Report (08-28):    No growth at 48 Hours    Culture - Urine (collected 08-25)  Source: Clean Catch Clean Catch (Midstream)  Final Report (08-26):    No growth    Culture - Blood (collected 08-24)  Source: .Blood Blood-Peripheral  Gram Stain (08-27):    Growth in aerobic bottle: Gram Negative Rods  Final Report (08-27):    Growth in aerobic bottle: Klebsiella oxytoca/Raoultella ornithinolytica    Direct identification is available within approximately 3-5    hours either by Blood Panel Multiplexed PCR or Direct    MALDI-TOF. Details: [email or web address]  Organism: Blood Culture PCR  Klebsiella oxytoca /Raoutella ornithinolytica (08-27)  Organism: Klebsiella oxytoca /Raoutella ornithinolytica (08-27)    Sensitivities:      Method Type: TABITHA      -  Amikacin: S <=16      -  Ampicillin: R >16 These ampicillin results predict results for amoxicillin      -  Ampicillin/Sulbactam: S 8/4      -  Aztreonam: S <=4      -  Cefazolin: R 8      -  Cefepime: SDD 4 The breakpoint for susceptible dose dependent may require the usage of a higher cefepime dose (equivalent to adult dose of 2g q8h for normal renal function) for successful treatment.      -  Cefoxitin: S <=8      -  Ceftriaxone: S <=1      -  Ciprofloxacin: S <=0.25      -  Ertapenem: S <=0.5      -  Gentamicin: S <=2      -  Imipenem: S <=1      -  Levofloxacin: S <=0.5      -  Meropenem: S <=1      -  Piperacillin/Tazobactam: S <=8      -  Tobramycin: S <=2      -  Trimethoprim/Sulfamethoxazole: S 1/19  Organism: Blood Culture PCR (08-27)    Sensitivities:      Method Type: PCR      -  Klebsiella oxytoca: Detec    Culture - Blood (collected 08-24)  Source: .Blood Blood-Peripheral  Preliminary Report (08-29):    No growth at 4 days

## 2023-08-31 NOTE — PROGRESS NOTE ADULT - ASSESSMENT
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  Admitted 8/24/2023 after being found unresponsive at home.  Suspected Narcotic OD. He was intubated emergently in ED for obtundation.  Noted to be profoundly Hypoglycemic. Pt was found to have kleb oxytoca bacteremia, likely from aspiration pna. Pt started on ceftriaxone. Pt extubated 8/25 to NC and was dialyzed.  Patient responding to ceftriaxone and although has SDD to cefepime is S to ceftriaxone and now afebrile, normal wbc, no rigors, and normal vitals. Would continue the ceftriaxone 2g for now and monitor.  He reports numerous loose stools but not watery. He is hypokalemic and although this may be multifactorial it could be from all the stool events he is having. please address and if this continuous to worsen will have to send Clostridioides difficile   His LFTs are are rising and his platelets sare trending downward -should involve gastroenterology     8/29: remains afebrile, RA, no new cbc, Cr better, repeat BC x 1 with no growth to date, Ceftriaxone IV continued. Right arm with swelling, US venous doppler was done prior, negative for DVT.   Attending addendum:  I have reviewed all pertinent clinical information and agree with the NP's note.   continue ceftriaxone   pain management   stool count  monitor platelets closely-Hematology consult     8/30: no fevers, tachycardic, RA, no new lab work, repeat BCs with no growth to date, Ceftriaxone IV continued. Pt denies having diarrhea, reports more solid stools now.   Attending addendum:  I have reviewed all pertinent clinical information and agree with the NP's note.   day 6 of negative blood cultures   if patient is doing well tomorrow can d/c antibiotics after ceftriaxone dose    8/31: no fevers, RA, no wbc, Hgb 8.1, repeat BC x  1 with no growth to date, abx are complete after today's dose of ceftriaxone. US RUE done - no DVT    Klebsiella bacteremia  ESRD   Hypokalemia   Thrombocytopenia   Transaminitis     Plan:  stop ceftriaxone 2g q24hrs after today's dose and continue monitoring off abx   follow cultures - repeat BC x 1 with no growth to date   watch for fevers   hemodialysis per renal   trend LFTs  trend platelets  consider hematology consult if platelets continue to fall   pain control        89 year old male PMHx Alcohol Abuse, Chronic Pancreatitis, HCV (s/p Tx with SVR), COPD, Esophageal Stricture (s/p Stenting 4/22 with subsequest removal 12/16/2022), DVT ( on Eliquis), ESRD ( on HD), Prior Lung Abscess (Pseudomonas / Morgenella), HTN, Chronic Systolic CHF, Chronic Pain ( Narc Dependant), 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  Admitted 8/24/2023 after being found unresponsive at home.  Suspected Narcotic OD. He was intubated emergently in ED for obtundation.  Noted to be profoundly Hypoglycemic. Pt was found to have kleb oxytoca bacteremia, likely from aspiration pna. Pt started on ceftriaxone. Pt extubated 8/25 to NC and was dialyzed.  Patient responding to ceftriaxone and although has SDD to cefepime is S to ceftriaxone and now afebrile, normal wbc, no rigors, and normal vitals. Would continue the ceftriaxone 2g for now and monitor.  He reports numerous loose stools but not watery. He is hypokalemic and although this may be multifactorial it could be from all the stool events he is having. please address and if this continuous to worsen will have to send Clostridioides difficile   His LFTs are are rising and his platelets sare trending downward -should involve gastroenterology     8/29: remains afebrile, RA, no new cbc, Cr better, repeat BC x 1 with no growth to date, Ceftriaxone IV continued. Right arm with swelling, US venous doppler was done prior, negative for DVT.   Attending addendum:  I have reviewed all pertinent clinical information and agree with the NP's note.   continue ceftriaxone   pain management   stool count  monitor platelets closely-Hematology consult     8/30: no fevers, tachycardic, RA, no new lab work, repeat BCs with no growth to date, Ceftriaxone IV continued. Pt denies having diarrhea, reports more solid stools now.   Attending addendum:  I have reviewed all pertinent clinical information and agree with the NP's note.   day 6 of negative blood cultures   if patient is doing well tomorrow can d/c antibiotics after ceftriaxone dose    8/31: no fevers, RA, no wbc, Hgb 8.1, repeat BC x  1 with no growth to date, abx are complete after today's dose of ceftriaxone. US RUE done - no DVT    Klebsiella bacteremia  ESRD   Hypokalemia   Thrombocytopenia   Transaminitis     Plan:  stop ceftriaxone 2g q24hrs after today's dose and continue monitoring off abx   follow cultures - repeat BC x 1 with no growth to date   watch for fevers   hemodialysis per renal   trend LFTs  trend platelets  consider hematology consult if platelets continue to fall   pain control     signing off. please call with questions

## 2023-08-31 NOTE — PROGRESS NOTE ADULT - ASSESSMENT
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  Admitted 8/24/2023 after being found unresponsive at home.  Suspected Narcotic OD.  Intubated emergently in ED for Obtundation.  Noted to be profoundly Hypoglycemic.  Patient unresponsive on Vent on exam and unable to assist with HPI or ROS. Pt was found to have kleb oxytoca bacteremia, likely from aspiration pna. Pt started on ceftriaxone. Pt extubated 8/25 to NC and was dialyzed. Pt now HD stable, satting well on NC, and stable for downgrade to regular medical floor on 8/27/23.    Acute toxic encephalopathy with opioid overdose. now improved. - verified istop and with NP at pcp office oxycodone 10mg   Sepsis with aspiration pneumonia and klebsiella bacteremia and associated acute hypoxic respiratory failure s/p intubation and extubation in the ICU. sepsis has resolved. cont iv ceftriaxone per ID.   8/31/2023 stop antibx after today's dose  3.	Severe PCM. cont nepro supplement. nutrition service following  4.	Hx of chronic pancreatitis. cont creon.   5.	Hx of COPD. sable.   6.	pA Fib. now sinus rhythm. cont eliquis. no active gross bleeding  7.	AOCD. stable Hb.   8.	ESRD. cont HD per renal  9.	Hypokalemia. will be replaced . Follow BMP in am.  10.	Chronic thrombocytopenia. slowly declining since July.  unclear etiology as review of old labs dating back to 2020 reveal wide fluctuations in platelet count  during thsi admission grabiel be due to sepsis- and antibx use but was low on admission and prior to admsision. will consult heme/onc     PT eval pending.       DVT ppx: eliquis  Full code.  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  Admitted 8/24/2023 after being found unresponsive at home.  Suspected Narcotic OD.  Intubated emergently in ED for Obtundation.  Noted to be profoundly Hypoglycemic.  Patient unresponsive on Vent on exam and unable to assist with HPI or ROS. Pt was found to have kleb oxytoca bacteremia, likely from aspiration pna. Pt started on ceftriaxone. Pt extubated 8/25 to NC and was dialyzed. Pt now HD stable, satting well on NC, and stable for downgrade to regular medical floor on 8/27/23.    Acute toxic encephalopathy with opioid overdose. now improved. - verified istop and with NP at pcp office oxycodone 10mg   Sepsis with aspiration pneumonia and klebsiella bacteremia and associated acute hypoxic respiratory failure s/p intubation and extubation in the ICU. sepsis has resolved. cont iv ceftriaxone per ID.   8/31/2023 stop antibx after today's dose  3.	Severe PCM. cont nepro supplement. nutrition service following  4.	Hx of chronic pancreatitis. cont creon.   5.	Hx of COPD. sable.   6.	pA Fib. now sinus rhythm. cont eliquis. no active gross bleeding  7.	AOCD. stable Hb.   8.	ESRD. cont HD per renal  9.	Hypokalemia. will be replaced . Follow BMP in am.  10.	Chronic thrombocytopenia. slowly declining since July.  unclear etiology as review of old labs dating back to 2020 reveal wide fluctuations in platelet count  during thsi admission grabiel be due to sepsis- and antibx use but was low on admission and prior to admsision. will consult heme/onc   11.	elevated lfts - improving since admission h/o chronic hepattis c  - suspect also exacerbated by Sepsis-and antibx use   appropriate images as ordered by colleagues were reviewed again and liver  appears wnl. continue to monitor avoid hepatotoxic meds     PT eval pending.       DVT ppx: eliquis  Full code.

## 2023-08-31 NOTE — CONSULT NOTE ADULT - ASSESSMENT
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  Admitted 8/24/2023 after being found unresponsive at home. Bascteremic.    #Thrombocytopenia   -patient has had on and off thrombocytopenia at least since 2020.  -likely multifactorial (hx ETOH, elevated LFT's, abx)  Plt on admit- 96 and today-68  no active bleeding/ecchymosis  Peripheral Smear- noted from 8/24  LFT's-,elevated Hepatitis C (+)  pending DIC panel, hit unlikely since present on admission   Head CT-negative on admit.  -Daily CBC  -avoid non-essential meds that can exacerbate plt drop   Keep plt>10, Febrile in last 24hours >15K, LP>40K, Non-major bleeding >50K, Major bleeding >80K     #Normocytic anemia   -appears to be chronic since 2019  -pending anemia/hemolysis w/u   -maintain hgb >7 or if symptomatic       Thank you for the referral. Will continue to monitor the patient.  Please call with any questions [phone]  Above reviewed with Attending Dr. 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  Admitted 8/24/2023 after being found unresponsive at home. Bascteremic.    #Thrombocytopenia   -patient has had on and off thrombocytopenia at least since 2020.  -likely multifactorial (hx ETOH, elevated LFT's, abx)  Plt on admit- 96 and today-68  no active bleeding/ecchymosis  Peripheral Smear- noted from 8/24  LFT's-,elevated Hepatitis C (+)  pending DIC panel, hit unlikely since present on admission   Head CT-negative on admit.  -Daily CBC  -avoid non-essential meds that can exacerbate plt drop   Keep plt>10, Febrile in last 24hours >15K, LP>40K, Non-major bleeding >50K, Major bleeding >80K     #Normocytic anemia   -appears to be chronic since 2019  -pending anemia/hemolysis w/u   -pending epo/ TSH  -maintain hgb >7 or if symptomatic       Thank you for the referral. Will continue to monitor the patient.  Please call with any questions [phone]  Above reviewed with Attending Dr. Gee MLEO/[facility]  [address] [facility], [address], [place], [place]  [phone]  *Note not finalized until signed by Attending Physician         independent

## 2023-08-31 NOTE — PROGRESS NOTE ADULT - NS ATTEND AMEND GEN_ALL_CORE FT
I have reviewed all pertinent clinical information and agree with the NP's note.   completing antibiotics today   monitor off antibiotics going forward  continue hemodialysis per renal   rest of care per medicine     Will sign off. Please call with questions.     Burak Gardiner, DO  Infectious Disease Attending  Reachable via Microsoft Teams or ID office: [phone]  After 5pm/weekends please call [phone] for all inquiries and new consult(s)

## 2023-08-31 NOTE — PROGRESS NOTE ADULT - SUBJECTIVE AND OBJECTIVE BOX
LAURENTBETTYIMELDA VAZQUEZ  MRN-[id number]    Follow Up:  bacteremia     Interval History: the pt was seen and examined, no acute distress, pt is awake and alert, continues to complaint of general body pains, no fevers, RA, no wbc.     PAST MEDICAL & SURGICAL HISTORY:  ETOH abuse  sober x1 year approximately      Hepatitis C  treated      Gout      HTN (hypertension)      H/O chronic pancreatitis      Wound of right foot      ESRD on hemodialysis      DVT (deep venous thrombosis)      Gastric perforation          ROS:    [ ] Unobtainable because:  [x ] All other systems negative    Constitutional: no fever, no chills, general body pain  Head: no trauma  Eyes: no vision changes, no eye pain  ENT:  no sore throat, no rhinorrhea  Cardiovascular:  no chest pain, no palpitation  Respiratory:  no SOB, no cough  GI:  no abd pain, no vomiting, no diarrhea  urinary: no dysuria, no hematuria, no flank pain  musculoskeletal:  no joint pain, no joint swelling  skin:  no rash  neurology:  no headache, no seizure, no change in mental status  psych: no anxiety, no depression         Allergies  Tylenol (Other)        ANTIMICROBIALS:      OTHER MEDS:  apixaban 5 milliGRAM(s) Enteral Tube two times a day  benzocaine/menthol Lozenge 1 Lozenge Oral four times a day PRN  chlorhexidine 2% Cloths 1 Application(s) Topical <User Schedule>  oxyCODONE    IR 10 milliGRAM(s) Oral every 4 hours PRN  pancrelipase  (CREON  6,000 Lipase Units) 1 Capsule(s) Oral three times a day with meals  pantoprazole    Tablet 40 milliGRAM(s) Oral before breakfast  potassium chloride   Powder 40 milliEquivalent(s) Oral every 4 hours      Vital Signs Last 24 Hrs  T(C): 36.7 (31 Aug 2023 17:17), Max: 36.9 (31 Aug 2023 12:40)  T(F): 98.1 (31 Aug 2023 17:17), Max: 98.4 (31 Aug 2023 12:40)  HR: 100 (31 Aug 2023 17:17) (93 - 108)  BP: 132/72 (31 Aug 2023 17:17) (115/75 - 159/88)  BP(mean): --  RR: 18 (31 Aug 2023 17:17) (16 - 18)  SpO2: 98% (31 Aug 2023 17:17) (97% - 99%)    Parameters below as of 31 Aug 2023 17:17  Patient On (Oxygen Delivery Method): room air        Physical Exam:  Constitutional: chronically ill, cachectic male in NAD   HEAD/EYES: anicteric, no conjunctival injection  ENT:  supple, no thrush, poor dentition   Cardiovascular:   normal S1, S2, no murmur, right arm swelling  Respiratory: diminished bilaterally, no wheezes, no rhonchi   GI:  soft, mildly diffusely tender, normal bowel sounds  :  no chen, no CVA tenderness  Musculoskeletal:  no synovitis, decreased ROM of the lower extremities, wiggles toes , LUE fistula with palpable thrill, wasted   Neurologic: awake and alert, no focal findings  Skin:  no rash, no erythemax3,  no phlebitis,   Heme/Onc: no cervical  lymphadenopathy   Psychiatric:  awake, alert, calm behavior     WBC Count: 6.43 K/uL (08-31 @ 09:10)  WBC Count: 5.98 K/uL (08-28 @ 09:55)  WBC Count: 7.21 K/uL (08-27 @ 06:20)  WBC Count: 8.19 K/uL (08-26 @ 03:43)  WBC Count: 4.87 K/uL (08-25 @ 12:41)  WBC Count: 4.39 K/uL (08-25 @ 02:00)  WBC Count: 4.99 K/uL (08-24 @ 19:50)                            8.1    6.43  )-----------( 68       ( 31 Aug 2023 09:10 )             24.1       08-31    147<H>  |  112<H>  |  65<H>  ----------------------------<  105<H>  2.6<LL>   |  24  |  5.44<H>    Ca    7.5<L>      31 Aug 2023 09:10  Phos  2.8     08-31  Mg     1.9     08-31    TPro  5.0<L>  /  Alb  1.6<L>  /  TBili  0.2  /  DBili  x   /  AST  58<H>  /  ALT  233<H>  /  AlkPhos  390<H>  08-31      Urinalysis Basic - ( 31 Aug 2023 09:10 )    Color: x / Appearance: x / SG: x / pH: x  Gluc: 105 mg/dL / Ketone: x  / Bili: x / Urobili: x   Blood: x / Protein: x / Nitrite: x   Leuk Esterase: x / RBC: x / WBC x   Sq Epi: x / Non Sq Epi: x / Bacteria: x        Creatinine Trend: 5.44<--, 2.29<--, 4.69<--, 3.82<--, 2.72<--, 3.92<--      MICROBIOLOGY:  v  .Blood Blood-Peripheral  08-26-23   No growth at 5 days  --  --      Clean Catch Clean Catch (Midstream)  08-25-23   No growth  --  --      .Blood Blood-Peripheral  08-24-23   No growth at 5 days  --  Blood Culture PCR  Klebsiella oxytoca /Raoutella ornithinolytica          Rapid RVP Result: [facility] (08-24 @ 19:50)      SARS-CoV-2: Rebekah (24 Aug 2023 19:50)    RADIOLOGY:

## 2023-09-01 NOTE — PROGRESS NOTE ADULT - SUBJECTIVE AND OBJECTIVE BOX
Heme/Onc Progress note    INTERVAL HPI/OVERNIGHT EVENTS:  Patient S&E at bedside. No o/n events, patient resting comfortably. No complaints at this time. Patient denies fever, chills, dizziness, weakness, CP, palpitations, SOB, cough, N/V/D/C, dysuria, changes in bowel movements, LE edema.    VITAL SIGNS:  T(F): 98.1 (09-01-23 @ 05:42)  HR: 94 (09-01-23 @ 05:42)  BP: 132/78 (09-01-23 @ 05:42)  RR: 17 (09-01-23 @ 05:42)  SpO2: 100% (09-01-23 @ 05:42)  Wt(kg): --    PHYSICAL EXAM:    Constitutional: NAD, cachetic   Eyes: EOMI, sclera non-icteric  Neck: supple, no masses, no JVD  Respiratory: diminished b/l   Cardiovascular: RRR, no M/R/G  Gastrointestinal: soft, NTND, no masses palpable, + BS,   Extremities: RUE +3 edema,   Neurological: AAOx2-3      MEDICATIONS  (STANDING):  apixaban 5 milliGRAM(s) Enteral Tube two times a day  chlorhexidine 2% Cloths 1 Application(s) Topical <User Schedule>  pancrelipase  (CREON  6,000 Lipase Units) 1 Capsule(s) Oral three times a day with meals  pantoprazole    Tablet 40 milliGRAM(s) Oral before breakfast    MEDICATIONS  (PRN):  benzocaine/menthol Lozenge 1 Lozenge Oral four times a day PRN Sore Throat  oxyCODONE    IR 10 milliGRAM(s) Oral every 4 hours PRN Severe Pain (7 - 10)      Allergies    Tylenol (Other)    Intolerances        LABS:                        8.1    6.43  )-----------( 68       ( 31 Aug 2023 09:10 )             24.1     08-31    147<H>  |  112<H>  |  65<H>  ----------------------------<  105<H>  2.6<LL>   |  24  |  5.44<H>    Ca    7.5<L>      31 Aug 2023 09:10  Phos  2.8     08-31  Mg     1.9     08-31    TPro  5.0<L>  /  Alb  1.6<L>  /  TBili  0.2  /  DBili  x   /  AST  58<H>  /  ALT  233<H>  /  AlkPhos  390<H>  08-31    PT/INR - ( 31 Aug 2023 22:30 )   PT: 14.0 sec;   INR: 1.17 ratio         PTT - ( 31 Aug 2023 22:30 )  PTT:24.5 sec  Urinalysis Basic - ( 31 Aug 2023 09:10 )    Color: x / Appearance: x / SG: x / pH: x  Gluc: 105 mg/dL / Ketone: x  / Bili: x / Urobili: x   Blood: x / Protein: x / Nitrite: x   Leuk Esterase: x / RBC: x / WBC x   Sq Epi: x / Non Sq Epi: x / Bacteria: x        RADIOLOGY & ADDITIONAL TESTS:  Studies reviewed.    ASSESSMENT & PLAN:

## 2023-09-01 NOTE — PROGRESS NOTE ADULT - ASSESSMENT
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  Admitted 8/24/2023 after being found unresponsive at home.  Suspected Narcotic OD.  Intubated emergently in ED for Obtundation.  Noted to be profoundly Hypoglycemic.  Patient unresponsive on Vent on exam and unable to assist with HPI or ROS. Pt was found to have kleb oxytoca bacteremia, likely from aspiration pna. Pt started on ceftriaxone. Pt extubated 8/25 to NC and was dialyzed. Pt now HD stable, satting well on NC, and stable for downgrade to regular medical floor on 8/27/23.    Acute toxic encephalopathy with opioid overdose. now improved. - verified istop and with NP at pcp office oxycodone 10mg   Sepsis with aspiration pneumonia and klebsiella bacteremia and associated acute hypoxic respiratory failure s/p intubation and extubation in the ICU. sepsis has resolved. cont iv ceftriaxone per ID.   8/31/2023 stop antibx after today's dose  3.	Severe PCM. cont nepro supplement. nutrition service following  4.	Hx of chronic pancreatitis. cont creon.   5.	Hx of COPD. sable.   6.	pA Fib. now sinus rhythm. cont eliquis. no active gross bleeding  7.	AOCD. stable Hb.   8.	ESRD. cont HD per renal  9.	Hypokalemia. will be replaced . Follow BMP in am.  10.	Chronic thrombocytopenia. slowly declining since July.  unclear etiology as review of old labs dating back to 2020 reveal wide fluctuations in platelet count  during thsi admission grabiel be due to sepsis- and antibx use but was low on admission and prior to admsision. will consult heme/onc   9/1/2023 appreciate hem/onc consult will monitor platelet   11.	elevated lfts - improving since admission h/o chronic hepattis c  - suspect also exacerbated by Sepsis-and antibx use   appropriate images as ordered by colleagues were reviewed again and liver  appears wnl. continue to monitor avoid hepatotoxic meds     9/1/2023 PT eval pending. last 3 days i have called personally on today to get it done      DVT ppx: eliquis  Full code.

## 2023-09-01 NOTE — PROGRESS NOTE ADULT - SUBJECTIVE AND OBJECTIVE BOX
CHIEF COMPLAINT: FU of acute toxic encephalopathy with opioid overdose, sepsis with acute respiratory failure with aspiration pneumonia and klebsiella bacteremia  Patient was seen in HD facility  no fever  no n/v/d/cp/sob      MEDICATIONS  (STANDING):  apixaban 5 milliGRAM(s) Enteral Tube two times a day  chlorhexidine 2% Cloths 1 Application(s) Topical <User Schedule>  pancrelipase  (CREON  6,000 Lipase Units) 1 Capsule(s) Oral three times a day with meals  pantoprazole    Tablet 40 milliGRAM(s) Oral before breakfast    MEDICATIONS  (PRN):  benzocaine/menthol Lozenge 1 Lozenge Oral four times a day PRN Sore Throat  oxyCODONE    IR 10 milliGRAM(s) Oral every 4 hours PRN Severe Pain (7 - 10)      Vital Signs Last 24 Hrs  T(C): 36.6 (01 Sep 2023 11:24), Max: 36.7 (31 Aug 2023 17:17)  T(F): 97.9 (01 Sep 2023 11:24), Max: 98.1 (31 Aug 2023 17:17)  HR: 110 (01 Sep 2023 11:24) (94 - 110)  BP: 138/81 (01 Sep 2023 11:24) (132/72 - 138/81)  BP(mean): --  RR: 17 (01 Sep 2023 11:24) (17 - 18)  SpO2: 99% (01 Sep 2023 11:24) (98% - 100%)    Parameters below as of 01 Sep 2023 11:24  Patient On (Oxygen Delivery Method): room air          PHYSICAL EXAM:    GENERAL: Malnourished and on room air  CHEST/LUNG:  No wheezing, no crackles   HEART: Regular rate and rhythm; No murmurs  ABDOMEN: Soft, Nontender, Nondistended; Bowel sounds present  EXTREMITIES:  No clubbing, cyanosis, or +edema to rue   Psychiatry: AAO x 3, mood is appropriate         Labs:                          7.1    5.03  )-----------( 77       ( 01 Sep 2023 06:55 )             21.6   09-01    145  |  111<H>  |  41<H>  ----------------------------<  89  3.2<L>   |  28  |  4.27<H>    Ca    7.1<L>      01 Sep 2023 06:55  Phos  2.8     08-31  Mg     1.9     08-31    TPro  4.7<L>  /  Alb  1.5<L>  /  TBili  0.2  /  DBili  x   /  AST  51<H>  /  ALT  186<H>  /  AlkPhos  352<H>  09-01    CAPILLARY BLOOD GLUCOSE    Culture - Blood (collected 08-26)  Source: .Blood Blood-Peripheral  Preliminary Report (08-28):    No growth at 48 Hours    Culture - Urine (collected 08-25)  Source: Clean Catch Clean Catch (Midstream)  Final Report (08-26):    No growth    Culture - Blood (collected 08-24)  Source: .Blood Blood-Peripheral  Gram Stain (08-27):    Growth in aerobic bottle: Gram Negative Rods  Final Report (08-27):    Growth in aerobic bottle: Klebsiella oxytoca/Raoultella ornithinolytica    Direct identification is available within approximately 3-5    hours either by Blood Panel Multiplexed PCR or Direct    MALDI-TOF. Details: [email or web address]  Organism: Blood Culture PCR  Klebsiella oxytoca /Raoutella ornithinolytica (08-27)  Organism: Klebsiella oxytoca /Raoutella ornithinolytica (08-27)    Sensitivities:      Method Type: TABITHA      -  Amikacin: S <=16      -  Ampicillin: R >16 These ampicillin results predict results for amoxicillin      -  Ampicillin/Sulbactam: S 8/4      -  Aztreonam: S <=4      -  Cefazolin: R 8      -  Cefepime: SDD 4 The breakpoint for susceptible dose dependent may require the usage of a higher cefepime dose (equivalent to adult dose of 2g q8h for normal renal function) for successful treatment.      -  Cefoxitin: S <=8      -  Ceftriaxone: S <=1      -  Ciprofloxacin: S <=0.25      -  Ertapenem: S <=0.5      -  Gentamicin: S <=2      -  Imipenem: S <=1      -  Levofloxacin: S <=0.5      -  Meropenem: S <=1      -  Piperacillin/Tazobactam: S <=8      -  Tobramycin: S <=2      -  Trimethoprim/Sulfamethoxazole: S 1/19  Organism: Blood Culture PCR (08-27)    Sensitivities:      Method Type: PCR      -  Klebsiella oxytoca: Detec    Culture - Blood (collected 08-24)  Source: .Blood Blood-Peripheral  Preliminary Report (08-29):    No growth at 4 days

## 2023-09-01 NOTE — PROGRESS NOTE ADULT - SUBJECTIVE AND OBJECTIVE BOX
[facility] [place] NEPHROLOGY SERVICES, [facility]  NEPHROLOGY AND HYPERTENSION  [address]  [address]  [place] [address]  [phone]    MD HALIE OLIVIA, MD ROGERIO ESPOSITO, MD SHERON JUAREZ, MD JANICE ARREOLA MD          Patient events noted    MEDICATIONS  (STANDING):  apixaban 5 milliGRAM(s) Enteral Tube two times a day  chlorhexidine 2% Cloths 1 Application(s) Topical <User Schedule>  epoetin mejia-epbx (RETACRIT) Injectable 28228 Unit(s) IV Push <User Schedule>  ferrous    sulfate 325 milliGRAM(s) Oral daily  pancrelipase  (CREON  6,000 Lipase Units) 1 Capsule(s) Oral three times a day with meals  pantoprazole    Tablet 40 milliGRAM(s) Oral before breakfast    MEDICATIONS  (PRN):  benzocaine/menthol Lozenge 1 Lozenge Oral four times a day PRN Sore Throat  oxyCODONE    IR 10 milliGRAM(s) Oral every 4 hours PRN Severe Pain (7 - 10)      08-31-23 @ 07:01  -  09-01-23 @ 07:00  --------------------------------------------------------  IN: 0 mL / OUT: 1700 mL / NET: -1700 mL    09-01-23 @ 07:01  -  09-01-23 @ 16:46  --------------------------------------------------------  IN: 400 mL / OUT: 0 mL / NET: 400 mL      PHYSICAL EXAM:      T(C): 36.6 (09-01-23 @ 11:24), Max: 36.7 (08-31-23 @ 17:17)  HR: 110 (09-01-23 @ 11:24) (94 - 110)  BP: 138/81 (09-01-23 @ 11:24) (132/72 - 138/81)  RR: 17 (09-01-23 @ 11:24) (17 - 18)  SpO2: 99% (09-01-23 @ 11:24) (98% - 100%)  Wt(kg): --  Lungs clear  Heart S1S2  Abd soft NT ND  Extremities:   tr edema                                    7.1    5.03  )-----------( 77       ( 01 Sep 2023 06:55 )             21.6     09-01    145  |  111<H>  |  41<H>  ----------------------------<  89  3.2<L>   |  28  |  4.27<H>    Ca    7.1<L>      01 Sep 2023 06:55  Phos  2.8     08-31  Mg     1.9     08-31    TPro  4.7<L>  /  Alb  1.5<L>  /  TBili  0.2  /  DBili  x   /  AST  51<H>  /  ALT  186<H>  /  AlkPhos  352<H>  09-01      LIVER FUNCTIONS - ( 01 Sep 2023 06:55 )  Alb: 1.5 g/dL / Pro: 4.7 gm/dL / ALK PHOS: 352 U/L / ALT: 186 U/L / AST: 51 U/L / GGT: x           Creatinine Trend: 4.27<--, 5.44<--, 2.29<--, 4.69<--, 3.82<--, 2.72<--      Assessment   ESRD, maintenance     Plan:  HD for tomorrow   Retacrit and Fe  T and C     Austin Dukes MD

## 2023-09-01 NOTE — PROGRESS NOTE ADULT - ASSESSMENT
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  Admitted 8/24/2023 after being found unresponsive at home. Bascteremic.    #Thrombocytopenia   -patient has had on and off thrombocytopenia at least since 2020.  -likely multifactorial (hx ETOH, elevated LFT's, abx)  Plt on admit- 96 and most recent-68  no active bleeding/ecchymosis  Peripheral Smear- noted from 8/24  LFT's-,elevated Hepatitis C (+)  -DIC (-),  hit unlikely since present on admission   Head CT-negative on admit.  -Daily CBC  -avoid non-essential meds that can exacerbate plt drop   Keep plt>10, Febrile in last 24hours >15K, LP>40K, Non-major bleeding >50K, Major bleeding >80K     #Normocytic anemia   -appears to be chronic since 2019  -pending anemia/hemolysis w/u   -pending epo/ TSH  -maintain hgb >7 or if symptomatic       Thank you for the referral. Will continue to monitor the patient.  Please call with any questions [phone]  Above reviewed with Attending Dr. 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  Admitted 8/24/2023 after being found unresponsive at home. Bascteremic.    #Thrombocytopenia   -patient has had on and off thrombocytopenia at least since 2020.  -likely multifactorial (hx ETOH, elevated LFT's, abx)  Plt on admit- 96 and most recent-68  no active bleeding/ecchymosis  Peripheral Smear- noted from 8/24  LFT's-,elevated Hepatitis C (+)  -DIC (-),  hit unlikely since present on admission   Head CT-negative on admit.  -Daily CBC  -avoid non-essential meds that can exacerbate plt drop   Keep plt>10, Febrile in last 24hours >15K, LP>40K, Non-major bleeding >50K, Major bleeding >80K     #Normocytic anemia   -appears to be chronic since 2019  -TSH-NL  -pending anemia/hemolysis w/u   -pending epo  -maintain hgb >7 or if symptomatic       Thank you for the referral. Will continue to monitor the patient.  Please call with any questions [phone]  Above reviewed with Attending Dr. 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  Admitted 8/24/2023 after being found unresponsive at home. Bascteremic.    #Thrombocytopenia   -patient has had on and off thrombocytopenia at least since 2020.  -likely multifactorial (hx ETOH, elevated LFT's, abx)  Plt on admit- 96 and most recent-77  no active bleeding/ecchymosis  Peripheral Smear- noted from 8/24  LFT's-,elevated Hepatitis C (+)  -DIC (-),  hit unlikely since present on admission   Head CT-negative on admit.  -Daily CBC  -avoid non-essential meds that can exacerbate plt drop   Keep plt>10, Febrile in last 24hours >15K, LP>40K, Non-major bleeding >50K, Major bleeding >80K     #Normocytic anemia   -appears to be chronic since 2019  -TSH-NL, hemolysis (-), Fol/ [id number]  -T iron-83, TIBC, %sat- unable to quant. ferritin 1001  -can challenge w/ iron supplementation   -pending epo  -maintain hgb >7 or if symptomatic       Thank you for the referral. Will continue to monitor the patient.  Please call with any questions [phone]  Above reviewed with Attending Dr. Gee MELO/[facility]  [address], [address], [place], [place]  [phone]  *Note not finalized until signed by Attending Physician

## 2023-09-02 NOTE — PROGRESS NOTE ADULT - SUBJECTIVE AND OBJECTIVE BOX
S/p stable HD today    Vital Signs Last 24 Hrs  T(C): 36.7 (09-02-23 @ 17:04), Max: 36.9 (09-02-23 @ 00:07)  T(F): 98.1 (09-02-23 @ 17:04), Max: 98.5 (09-02-23 @ 04:59)  HR: 95 (09-02-23 @ 17:04) (89 - 108)  BP: 111/67 (09-02-23 @ 17:04) (110/62 - 138/73)  RR: 17 (09-02-23 @ 17:04) (16 - 18)  SpO2: 96% (09-02-23 @ 17:04) (95% - 99%)    I&O's Detail    01 Sep 2023 07:01  -  02 Sep 2023 07:00  --------------------------------------------------------  IN:    Oral Fluid: 1020 mL  Total IN: 1020 mL    OUT:  Total OUT: 0 mL    Total NET: 1020 mL    02 Sep 2023 07:01  -  02 Sep 2023 22:43  --------------------------------------------------------  IN:    Oral Fluid: 240 mL  Total IN: 240 mL    OUT:    Other (mL): 1400 mL  Total OUT: 1400 mL    Lungs b/l air eentry  Heart S1S2  Abd soft NT ND  Extremities:   tr edema                        6.4    5.15  )-----------( 88       ( 02 Sep 2023 09:15 )             19.5     01 Sep 2023 06:55    145    |  111    |  41     ----------------------------<  89     3.2     |  28     |  4.27     Ca    7.1        01 Sep 2023 06:55    TPro  4.7    /  Alb  1.5    /  TBili  0.2    /  DBili  x      /  AST  51     /  ALT  186    /  AlkPhos  352    01 Sep 2023 06:55    LIVER FUNCTIONS - ( 01 Sep 2023 06:55 )  Alb: 1.5 g/dL / Pro: 4.7 gm/dL / ALK PHOS: 352 U/L / ALT: 186 U/L / AST: 51 U/L / GGT: x           apixaban 5 milliGRAM(s) Enteral Tube two times a day  benzocaine/menthol Lozenge 1 Lozenge Oral four times a day PRN  chlorhexidine 2% Cloths 1 Application(s) Topical <User Schedule>  epoetin mejia-epbx (RETACRIT) Injectable 42183 Unit(s) IV Push <User Schedule>  ferrous    sulfate 325 milliGRAM(s) Oral daily  oxyCODONE    IR 10 milliGRAM(s) Oral every 4 hours PRN  pancrelipase  (CREON  6,000 Lipase Units) 1 Capsule(s) Oral three times a day with meals  pantoprazole    Tablet 40 milliGRAM(s) Oral before breakfast    Assessment/Plan:    ESRD on HD TTS  Epoetin w/HD   Bld tx per medicine  TMP w/HD 1.4kg  F/u CBC    [phone]

## 2023-09-02 NOTE — PROGRESS NOTE ADULT - ASSESSMENT
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  Admitted 8/24/2023 after being found unresponsive at home.  Suspected Narcotic OD.  Intubated emergently in ED for Obtundation.  Noted to be profoundly Hypoglycemic.  Patient unresponsive on Vent on exam and unable to assist with HPI or ROS. Pt was found to have kleb oxytoca bacteremia, likely from aspiration pna. Pt started on ceftriaxone. Pt extubated 8/25 to NC and was dialyzed. Pt now HD stable, satting well on NC, and stable for downgrade to regular medical floor on 8/27/23.    Acute toxic encephalopathy with opioid overdose. now improved. - verified istop and with NP at pcp office oxycodone 10mg   Sepsis with aspiration pneumonia and klebsiella bacteremia and associated acute hypoxic respiratory failure s/p intubation and extubation in the ICU. sepsis has resolved. cont iv ceftriaxone per ID.   8/31/2023 stop antibx after today's dose  3.	Severe PCM. cont nepro supplement. nutrition service following  4.	Hx of chronic pancreatitis. cont creon.   5.	Hx of COPD. sable.   6.	pA Fib. now sinus rhythm. cont eliquis. no active gross bleeding  7.	AOCD. stable Hb.   9/2/2023 hgb at 6.4 on today no gross s/o active bleeding. one unit of blood ordred to be given during dialysis   8.	ESRD. cont HD per renal  9.	Hypokalemia. will be replaced . Follow BMP in am.  10.	Chronic thrombocytopenia. slowly declining since July.  unclear etiology as review of old labs dating back to 2020 reveal wide fluctuations in platelet count  during thsi admission grabiel be due to sepsis- and antibx use but was low on admission and prior to admsision. will consult heme/onc   9/1/2023 appreciate hem/onc consult will monitor platelet   9/2/2023 platelet slowly improving  11.	elevated lfts - improving since admission h/o chronic hepattis c  - suspect also exacerbated by Sepsis-and antibx use   appropriate images as ordered by colleagues were reviewed again and liver  appears wnl. continue to monitor avoid hepatotoxic meds     9/1/2023 PT eval pending. last 3 days i have called personally on today to get it done  9/2/2023 still await PT eval      DVT ppx: eliquis  Full code.

## 2023-09-02 NOTE — PROVIDER CONTACT NOTE (CRITICAL VALUE NOTIFICATION) - SITUATION
HCV weakly reactive
Potassium 2.6
Blood cultures drawn 8/24/23- Growth in aerobic bottle gram negative detected.
Hgb/Hct  6.4/19.5
K level is 2.7

## 2023-09-02 NOTE — PROGRESS NOTE ADULT - SUBJECTIVE AND OBJECTIVE BOX
CHIEF COMPLAINT: FU of acute toxic encephalopathy with opioid overdose, sepsis with acute respiratory failure with aspiration pneumonia and klebsiella bacteremia         TODAy ; hgb at 6.4 will require one unit during HD       MEDICATIONS  (STANDING):  apixaban 5 milliGRAM(s) Enteral Tube two times a day  chlorhexidine 2% Cloths 1 Application(s) Topical <User Schedule>  epoetin mejia-epbx (RETACRIT) Injectable 90106 Unit(s) IV Push <User Schedule>  ferrous    sulfate 325 milliGRAM(s) Oral daily  pancrelipase  (CREON  6,000 Lipase Units) 1 Capsule(s) Oral three times a day with meals  pantoprazole    Tablet 40 milliGRAM(s) Oral before breakfast    MEDICATIONS  (PRN):  benzocaine/menthol Lozenge 1 Lozenge Oral four times a day PRN Sore Throat  oxyCODONE    IR 10 milliGRAM(s) Oral every 4 hours PRN Severe Pain (7 - 10)    Vital Signs Last 24 Hrs  T(C): 36.9 (02 Sep 2023 09:15), Max: 37.2 (01 Sep 2023 17:13)  T(F): 98.4 (02 Sep 2023 09:15), Max: 98.9 (01 Sep 2023 17:13)  HR: 96 (02 Sep 2023 09:15) (96 - 108)  BP: 130/75 (02 Sep 2023 09:15) (124/77 - 139/76)  BP(mean): --  RR: 16 (02 Sep 2023 09:15) (16 - 18)  SpO2: 99% (02 Sep 2023 09:15) (98% - 99%)    Parameters below as of 02 Sep 2023 09:15  Patient On (Oxygen Delivery Method): room air              PHYSICAL EXAM:    GENERAL: Malnourished and on room air  CHEST/LUNG:  No wheezing, no crackles   HEART: Regular rate and rhythm; No murmurs  ABDOMEN: Soft, Nontender, Nondistended; Bowel sounds present  EXTREMITIES:  No clubbing, cyanosis, or +edema to rue   Psychiatry: AAO x 3, mood is appropriate         Labs:                                   6.4    5.15  )-----------( 88       ( 02 Sep 2023 09:15 )             19.5   09-01    145  |  111<H>  |  41<H>  ----------------------------<  89  3.2<L>   |  28  |  4.27<H>    Ca    7.1<L>      01 Sep 2023 06:55    TPro  4.7<L>  /  Alb  1.5<L>  /  TBili  0.2  /  DBili  x   /  AST  51<H>  /  ALT  186<H>  /  AlkPhos  352<H>  09-01    CAPILLARY BLOOD GLUCOSE    Culture - Blood (collected 08-26)  Source: .Blood Blood-Peripheral  Preliminary Report (08-28):    No growth at 48 Hours    Culture - Urine (collected 08-25)  Source: Clean Catch Clean Catch (Midstream)  Final Report (08-26):    No growth    Culture - Blood (collected 08-24)  Source: .Blood Blood-Peripheral  Gram Stain (08-27):    Growth in aerobic bottle: Gram Negative Rods  Final Report (08-27):    Growth in aerobic bottle: Klebsiella oxytoca/Raoultella ornithinolytica    Direct identification is available within approximately 3-5    hours either by Blood Panel Multiplexed PCR or Direct    MALDI-TOF. Details: [email or web address]  Organism: Blood Culture PCR  Klebsiella oxytoca /Raoutella ornithinolytica (08-27)  Organism: Klebsiella oxytoca /Raoutella ornithinolytica (08-27)    Sensitivities:      Method Type: TABITHA      -  Amikacin: S <=16      -  Ampicillin: R >16 These ampicillin results predict results for amoxicillin      -  Ampicillin/Sulbactam: S 8/4      -  Aztreonam: S <=4      -  Cefazolin: R 8      -  Cefepime: SDD 4 The breakpoint for susceptible dose dependent may require the usage of a higher cefepime dose (equivalent to adult dose of 2g q8h for normal renal function) for successful treatment.      -  Cefoxitin: S <=8      -  Ceftriaxone: S <=1      -  Ciprofloxacin: S <=0.25      -  Ertapenem: S <=0.5      -  Gentamicin: S <=2      -  Imipenem: S <=1      -  Levofloxacin: S <=0.5      -  Meropenem: S <=1      -  Piperacillin/Tazobactam: S <=8      -  Tobramycin: S <=2      -  Trimethoprim/Sulfamethoxazole: S 1/19  Organism: Blood Culture PCR (08-27)    Sensitivities:      Method Type: PCR      -  Klebsiella oxytoca: Detec    Culture - Blood (collected 08-24)  Source: .Blood Blood-Peripheral  Preliminary Report (08-29):    No growth at 4 days

## 2023-09-03 NOTE — PROGRESS NOTE ADULT - NUTRITIONAL ASSESSMENT
This patient has been assessed with a concern for Malnutrition and has been determined to have a diagnosis/diagnoses of Severe protein-calorie malnutrition and Underweight (BMI < 19).    This patient is being managed with:   Diet Renal Restrictions-  For patients receiving Renal Replacement - No Protein Restr No Conc K No Conc Phos Low Sodium  Supplement Feeding Modality:  Oral  Nepro Cans or Servings Per Day:  1       Frequency:  Three Times a day  Entered: Aug 26 2023  1:04PM  
This patient has been assessed with a concern for Malnutrition and has been determined to have a diagnosis/diagnoses of Severe protein-calorie malnutrition and Underweight (BMI < 19).    This patient is being managed with:   Diet Regular-  Supplement Feeding Modality:  Oral  Nepro Cans or Servings Per Day:  1       Frequency:  Three Times a day  Entered: Aug 31 2023 12:57PM  
This patient has been assessed with a concern for Malnutrition and has been determined to have a diagnosis/diagnoses of Severe protein-calorie malnutrition and Underweight (BMI < 19).    This patient is being managed with:   Diet Renal Restrictions-  For patients receiving Renal Replacement - No Protein Restr No Conc K No Conc Phos Low Sodium  Supplement Feeding Modality:  Oral  Nepro Cans or Servings Per Day:  1       Frequency:  Three Times a day  Entered: Aug 26 2023  1:04PM  
This patient has been assessed with a concern for Malnutrition and has been determined to have a diagnosis/diagnoses of Severe protein-calorie malnutrition and Underweight (BMI < 19).    This patient is being managed with:   Diet Renal Restrictions-  For patients receiving Renal Replacement - No Protein Restr No Conc K No Conc Phos Low Sodium  Supplement Feeding Modality:  Oral  Nepro Cans or Servings Per Day:  1       Frequency:  Three Times a day  Entered: Aug 26 2023  1:04PM  
This patient has been assessed with a concern for Malnutrition and has been determined to have a diagnosis/diagnoses of Severe protein-calorie malnutrition and Underweight (BMI < 19).    This patient is being managed with:   Diet Renal Restrictions-  For patients receiving Renal Replacement - No Protein Restr No Conc K No Conc Phos Low Sodium  Supplement Feeding Modality:  Oral  Nepro Cans or Servings Per Day:  1       Frequency:  Three Times a day  Entered: Aug 26 2023  1:04PM    This patient has been assessed with a concern for Malnutrition and has been determined to have a diagnosis/diagnoses of Severe protein-calorie malnutrition and Underweight (BMI < 19).    This patient is being managed with:   Diet Renal Restrictions-  For patients receiving Renal Replacement - No Protein Restr No Conc K No Conc Phos Low Sodium  Supplement Feeding Modality:  Oral  Nepro Cans or Servings Per Day:  1       Frequency:  Three Times a day  Entered: Aug 26 2023  1:04PM  
This patient has been assessed with a concern for Malnutrition and has been determined to have a diagnosis/diagnoses of Severe protein-calorie malnutrition and Underweight (BMI < 19).    This patient is being managed with:   Diet Renal Restrictions-  For patients receiving Renal Replacement - No Protein Restr No Conc K No Conc Phos Low Sodium  Supplement Feeding Modality:  Oral  Nepro Cans or Servings Per Day:  1       Frequency:  Three Times a day  Entered: Aug 26 2023  1:04PM  
This patient has been assessed with a concern for Malnutrition and has been determined to have a diagnosis/diagnoses of Severe protein-calorie malnutrition and Underweight (BMI < 19).    This patient is being managed with:   Diet Regular-  Supplement Feeding Modality:  Oral  Nepro Cans or Servings Per Day:  1       Frequency:  Three Times a day  Entered: Aug 31 2023 12:57PM  
This patient has been assessed with a concern for Malnutrition and has been determined to have a diagnosis/diagnoses of Severe protein-calorie malnutrition and Underweight (BMI < 19).    This patient is being managed with:   Diet Regular-  Supplement Feeding Modality:  Oral  Nepro Cans or Servings Per Day:  1       Frequency:  Three Times a day  Entered: Aug 31 2023 12:57PM

## 2023-09-03 NOTE — PROGRESS NOTE ADULT - PROVIDER SPECIALTY LIST ADULT
Hospitalist
Infectious Disease
Nephrology
Critical Care
Heme/Onc
Hospitalist
Infectious Disease
Infectious Disease
Nephrology
Pulmonology
Critical Care
Critical Care
Nephrology
Hospitalist
Pulmonology
Hospitalist

## 2023-09-03 NOTE — PROGRESS NOTE ADULT - ASSESSMENT
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  Admitted 8/24/2023 after being found unresponsive at home.  Suspected Narcotic OD.  Intubated emergently in ED for Obtundation.  Noted to be profoundly Hypoglycemic.  Patient unresponsive on Vent on exam and unable to assist with HPI or ROS. Pt was found to have kleb oxytoca bacteremia, likely from aspiration pna. Pt started on ceftriaxone. Pt extubated 8/25 to NC and was dialyzed. Pt now HD stable, satting well on NC, and stable for downgrade to regular medical floor on 8/27/23.    Acute toxic encephalopathy with opioid overdose. now improved. - verified istop and with NP at pcp office oxycodone 10mg   Sepsis with aspiration pneumonia and klebsiella bacteremia and associated acute hypoxic respiratory failure s/p intubation and extubation in the ICU. sepsis has resolved. cont iv ceftriaxone per ID.   8/31/2023 stop antibx after today's dose  3.	Severe PCM. cont nepro supplement. nutrition service following  4.	Hx of chronic pancreatitis. cont creon.   5.	Hx of COPD. sable.   6.	pA Fib. now sinus rhythm. cont eliquis. no active gross bleeding  7.	AOCD. stable Hb.   9/2/2023 hgb at 6.4 on today no gross s/o active bleeding. one unit of blood ordred to be given during dialysis   9/3/2023 s/p one unit appropiate response hgb at 8.5  8.	ESRD. cont HD per renal  9.	Hypokalemia. will be replaced . Follow BMP in am.  10.	Chronic thrombocytopenia. slowly declining since July.  unclear etiology as review of old labs dating back to 2020 reveal wide fluctuations in platelet count  during thsi admission grabiel be due to sepsis- and antibx use but was low on admission and prior to admsision. will consult heme/onc   9/1/2023 appreciate hem/onc consult will monitor platelet   9/2/2023 platelet slowly improving  11.	elevated lfts - improving since admission h/o chronic hepattis c  - suspect also exacerbated by Sepsis-and antibx use   appropriate images as ordered by colleagues were reviewed again and liver  appears wnl. continue to monitor avoid hepatotoxic meds     9/1/2023 PT eval pending. last 3 days i have called personally on today to get it done  9/2/2023 still await PT eval  9/3/2023 patient declines STr- will d/wc home  with ome pt kwadwo skinner is ensuring outpt HD slot reinstated      DVT ppx: eliquis  Full code.

## 2023-09-03 NOTE — PROGRESS NOTE ADULT - REASON FOR ADMISSION
Found unresponsive

## 2023-09-04 NOTE — DISCHARGE NOTE NURSING/CASE MANAGEMENT/SOCIAL WORK - NSDCVIVACCINE_GEN_ALL_CORE_FT
Tdap; 30-Aug-2022 22:32; Rusty Stratton (RN); [employer]; [id number] (Exp. Date: 29-Nov-2024); IntraMuscular; Deltoid Left.; 0.5 milliLiter(s); VIS (VIS Published: 09-May-2013, VIS Presented: 30-Aug-2022);

## 2023-09-04 NOTE — DISCHARGE NOTE PROVIDER - NSDCCPCAREPLAN_GEN_ALL_CORE_FT
PRINCIPAL DISCHARGE DIAGNOSIS  Diagnosis: Acute metabolic encephalopathy  Assessment and Plan of Treatment:       SECONDARY DISCHARGE DIAGNOSES  Diagnosis: Hypoglycemia  Assessment and Plan of Treatment:     Diagnosis: Severe protein-calorie malnutrition  Assessment and Plan of Treatment:     Diagnosis: Opiate abuse, continuous  Assessment and Plan of Treatment:     Diagnosis: PNA (pneumonia)  Assessment and Plan of Treatment:     Diagnosis: Opioid overdose  Assessment and Plan of Treatment:     Diagnosis: ESRD on dialysis  Assessment and Plan of Treatment:     Diagnosis: Acute respiratory failure with hypoxia  Assessment and Plan of Treatment:     Diagnosis: Unspecified atrial fibrillation  Assessment and Plan of Treatment:     Diagnosis: Chronic pancreatitis  Assessment and Plan of Treatment:     Diagnosis: Anemia of chronic disease  Assessment and Plan of Treatment:     Diagnosis: Thrombocytopenia  Assessment and Plan of Treatment:

## 2023-09-04 NOTE — DISCHARGE NOTE PROVIDER - NSTOBACCOUSAGEY/N_GEN_A_CS
Initiate Treatment: Will treat whole face and ears with Efudex in the new year, hydrocortisone lotion for relief. Will treat chest afterwards
No
Plan: 2 month follow up after treated with Efudex
Detail Level: Zone

## 2023-09-04 NOTE — DISCHARGE NOTE PROVIDER - HOSPITAL COURSE
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  Admitted 8/24/2023 after being found unresponsive at home.  Suspected Narcotic OD.  Intubated emergently in ED for Obtundation.  Noted to be profoundly Hypoglycemic.  Patient unresponsive on Vent on exam and unable to assist with HPI or ROS. Pt was found to have kleb oxytoca bacteremia, likely from aspiration pna. Pt started on ceftriaxone. Pt extubated 8/25 to NC and was dialyzed. Pt now HD stable, satting well on NC, and stable for downgrade to regular medical floor on 8/27/23.    Acute toxic encephalopathy with opioid overdose. now improved. - verified istop and with NP at pcp office oxycodone 10mg   Sepsis with aspiration pneumonia and klebsiella bacteremia and associated acute hypoxic respiratory failure s/p intubation and extubation in the ICU. sepsis has resolved. cont iv ceftriaxone per ID.   8/31/2023 stop antibx after today's dose  Severe PCM. cont nepro supplement. nutrition service following  Hx of chronic pancreatitis. cont creon.   Hx of COPD. sable.   pA Fib. now sinus rhythm. cont eliquis. no active gross bleeding  AOCD. stable Hb.   9/2/2023 hgb at 6.4 on today no gross s/o active bleeding. one unit of blood ordered to be given during dialysis   9/3/2023 s/p one unit appropriate response hgb at 8.5  ESRD. cont HD per renal  Hypokalemia. will be replaced . Follow BMP in am.  Chronic thrombocytopenia. slowly declining since July.  unclear etiology as review of old labs dating back to 2020 reveal wide fluctuations in platelet count  during this admission grabiel be due to sepsis- and antibx use but was low on admission and prior to admsision. will consult heme/onc   9/1/2023 appreciate hem/onc consult will monitor platelet   9/2/2023 platelet slowly improving  elevated lfts - improving since admission h/o chronic hepatitis c  - suspect also exacerbated by Sepsis-and antibx use   appropriate images as ordered by colleagues were reviewed again and liver  appears wnl. continue to monitor avoid hepatotoxic meds     9/1/2023 PT eval pending. last 3 days i have called personally on today to get it done  9/2/2023 still await PT eval  9/3/2023 patient declines STr- will d/w  home  with ome pt kwadwo skinner is ensuring outpt HD slot reinstated      DVT ppx: eliquis  Full code.

## 2023-09-04 NOTE — DISCHARGE NOTE NURSING/CASE MANAGEMENT/SOCIAL WORK - PATIENT PORTAL LINK FT
You can access the FollowMyHealth Patient Portal offered by [facility] by registering at the following website: [email or web address]. By joining [employer]’s FollowMyHealth portal, you will also be able to view your health information using other applications (apps) compatible with our system.

## 2023-09-04 NOTE — DISCHARGE NOTE PROVIDER - NSDCMRMEDTOKEN_GEN_ALL_CORE_FT
apixaban 5 mg oral tablet: 1 tab(s) orally 2 times a day  calcitriol 0.5 mcg oral capsule: 1 cap(s) orally once a day  ferrous sulfate 325 mg (65 mg elemental iron) oral tablet: 1 tab(s) orally once a day  folic acid 1 mg oral tablet: 1 tab(s) orally once a day  Multiple Vitamins oral tablet: 1 tab(s) orally once a day  NIFEdipine 30 mg oral tablet, extended release: 1 tab(s) orally once a day  oxyCODONE 10 mg oral tablet: 1 tab(s) orally every 4 hours, As needed, Severe Pain (7 - 10)  pancrelipase 6000 units-19,000 units-30,000 units oral delayed release capsule: 1 cap(s) orally 3 times a day (with meals)  pantoprazole 40 mg oral delayed release tablet: 1 tab(s) orally every 12 hours  polyethylene glycol 3350 oral powder for reconstitution: 17 gram(s) orally 2 times a day  senna leaf extract oral tablet: 2 tab(s) orally once a day (at bedtime)  sucralfate 1 g oral tablet: 1 tab(s) orally every 6 hours  thiamine 100 mg oral tablet: 1 tab(s) orally once a day

## 2023-09-04 NOTE — DISCHARGE NOTE PROVIDER - ATTENDING DISCHARGE PHYSICAL EXAMINATION:
GENERAL: Malnourished and on room air  CHEST/LUNG:  No wheezing, no crackles   HEART: Regular rate and rhythm; No murmurs  ABDOMEN: Soft, Nontender, Nondistended; Bowel sounds present  EXTREMITIES:  No clubbing, cyanosis, or + improved edema to rue   Psychiatry: AAO x 3, mood is appropriate

## 2023-09-04 NOTE — DISCHARGE NOTE PROVIDER - NSDCFUSCHEDAPPT_GEN_ALL_CORE_FT
Dane Macedo  [facility]  GASTRO OP [id number] [facility]  Scheduled Appointment: 10/12/2023    [facility]  CARDIOLOGY [address]  Scheduled Appointment: 10/23/2023    Hector Odom  [facility]  INTMED OP [id number] [facility]  Scheduled Appointment: 10/31/2023

## 2023-09-04 NOTE — DISCHARGE NOTE PROVIDER - DETAILS OF MALNUTRITION DIAGNOSIS/DIAGNOSES
This patient has been assessed with a concern for Malnutrition and was treated during this hospitalization for the following Nutrition diagnosis/diagnoses:     -  08/26/2023: Severe protein-calorie malnutrition   -  08/26/2023: Underweight (BMI < 19)

## 2023-09-04 NOTE — PROGRESS NOTE ADULT - PROBLEM SELECTOR PLAN 3
9 S/p HD X 3  in ICU in [facility], Cr plateaued at 5.7-6, off HD now; per renal plan for AVF creation on this admission as per d/w OP nephrologist baseline Cr is 3 and nearing ESRD  - avoid nephrotoxic, renally dose meds, trend Cr  - VA renal duplex with severe stenosis of the left renal artery; vascular consulted, no plan for intervention for HESHAM  - sevelamer 800 mg TID & bicarb 650 mg TID;  calcitriol 0.25 for secondary hyperparathyroidism per renal  - s/p AVF w/ vascular on 1/5 to the L arm, L arm precautions

## 2023-09-06 PROBLEM — L89.322 PRESSURE INJURY OF LEFT BUTTOCK, STAGE 2: Status: ACTIVE | Noted: 2023-01-01

## 2023-09-07 PROBLEM — R63.6 UNDERWEIGHT: Status: ACTIVE | Noted: 2023-01-01

## 2023-09-07 PROBLEM — K86.1 CHRONIC PANCREATITIS: Status: ACTIVE | Noted: 2022-02-09

## 2023-09-07 PROBLEM — I82.721 CHRONIC DEEP VEIN THROMBOSIS (DVT) OF BRACHIAL VEIN OF RIGHT UPPER EXTREMITY: Status: ACTIVE | Noted: 2023-01-01

## 2023-09-07 PROBLEM — Z09 HOSPITAL DISCHARGE FOLLOW-UP: Status: ACTIVE | Noted: 2023-01-01

## 2023-09-07 PROBLEM — Z74.1 REQUIRES ASSISTANCE WITH ACTIVITIES OF DAILY LIVING (ADL): Status: ACTIVE | Noted: 2023-01-01

## 2023-09-10 NOTE — HISTORY OF PRESENT ILLNESS
[Home] : at home, [unfilled] , at the time of the visit. [Medical Office: ([facility])___] : at the medical office located in  [Verbal consent obtained from patient] : the patient, [unfilled] [Post-hospitalization from ___ Hospital] : Post-hospitalization from [unfilled] Hospital [Admitted on: ___] : The patient was admitted on [unfilled] [Discharged on ___] : discharged on [unfilled] [Patient Contacted By: ____] : and contacted by [unfilled] [FreeTextEntry3] : CHECO Bustos [FreeTextEntry2] : Hospital Course: Discharge Date	04-Sep-2023 Admission Date	24-Aug-2023 22:36 Reason for Admission	Found unresponsive Hospital Course	 - Assessment	  62 year old male PMHx Alcohol Abuse, Chronic Pancreatitis, HCV (s/p Tx with SVR), COPD, Esophageal Stricture (s/p Stenting 4/22 with subsequest removal 12/16/2022), DVT ( on Eliquis), ESRD ( on HD), Prior Lung Abscess (Pseudomonas / Morgenella), HTN, Chronic Systolic CHF, Chronic Pain ( Narc Dependant), 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  Admitted 8/24/2023 after being found unresponsive at home. Suspected Narcotic OD.  Intubated emergently in ED for Obtundation.  Noted to be profoundly Hypoglycemic.  Patient unresponsive on Vent on exam and unable to assist with HPI or ROS. Pt was found to have kleb oxytoca bacteremia, likely from aspiration pna. Pt started on ceftriaxone. Pt extubated 8/25 to NC and was dialyzed. Pt now HD stable, satting well on NC, and stable for downgrade to regular medical floor on 8/27/23.   Acute toxic encephalopathy with opioid overdose. now improved. - verified istop and with NP at pcp office oxycodone 10mg Sepsis with aspiration pneumonia and klebsiella bacteremia and associated acute hypoxic respiratory failure s/p intubation and extubation in the ICU. sepsis has resolved. cont iv ceftriaxone per ID. 8/31/2023 stop antibx after today's dose Severe PCM. cont nepro supplement. nutrition service following Hx of chronic pancreatitis. cont creon. Hx of COPD. sable. pA Fib. now sinus rhythm. cont eliquis. no active gross bleeding AOCD. stable Hb. 9/2/2023 hgb at 6.4 on today no gross s/o active bleeding. one unit of blood ordered to be given during dialysis 9/3/2023 s/p one unit appropriate response hgb at 8.5 ESRD. cont HD per renal Hypokalemia. will be replaced . Follow BMP in am. Chronic thrombocytopenia. slowly declining since July.  unclear etiology as review of old labs dating back to 2020 reveal wide fluctuations in platelet count  during this admission grabiel be due to sepsis- and antibx use but was low on admission and prior to admsision. will consult heme/onc 9/1/2023 appreciate hem/onc consult will monitor platelet 9/2/2023 platelet slowly improving elevated lfts - improving since admission h/o chronic hepatitis c  - suspect also exacerbated by Sepsis-and antibx use appropriate images as ordered by colleagues were reviewed again and liver appears wnl. continue to monitor avoid hepatotoxic meds   9/1/2023 PT eval pending. last 3 days i have called personally on today to get it done 9/2/2023 still await PT eval 9/3/2023 patient declines STr- will d/w  home  with home pt kwadwo skinner is ensuring outpt HD slot reinstated. Today the patient reports that he is feeling very weak after the hospitalization. He is requesting HHA. He reports that he is able to ambulate few steps with the help of wheelchair and cane but he is unable to cook for himself. He lives with his 87 y/o mother who is unable to help him with ADLs and IADLs. KWADWO in the office was notified about patient's request.  The patient has pressure ulcers and has visiting nurse who comes in daily to change his wound dressings.  The patient is non-compliant with his medication regimen and reports that he is only taking Creon. Will renew Eliquis and Nifidipine prescription as per request. Encouraged medication compliance.

## 2023-09-26 NOTE — PATIENT PROFILE ADULT - NSPROGENOTHERPROVIDER_GEN_A_NUR
Breast Oncology Nurse Navigator    Called patient for initial outreach from nurse navigator. Left voicemail message with contact information. Requested a call back. Second attempt. none

## 2023-10-05 NOTE — ED PROVIDER NOTE - CLINICAL SUMMARY MEDICAL DECISION MAKING FREE TEXT BOX
pt here for abd pain x 1 day. h/o etoh use, narcotics OD, chronic pancreatitis, ESRD on HD MWF. exam found pt moaning in pain, cachectic, poor hygiene, abd is soft non-tender. VSS. O2 100% on RA. pt has productive cough.   chronic pain vs less likely acute intra-abd pathologies. r/o infectious process. r/o abnormal electrolytes.   will get cbc, cmp, lipase.

## 2023-10-05 NOTE — ED PROVIDER NOTE - PHYSICAL EXAMINATION
CONSTITUTIONAL: poor hygiene, cachetic, moaning in pain.   NEURO: Alert & oriented. Sensory and motor functions are grossly intact.  PSYCH: Mood appropriate. Thought processes intact.   NECK: Supple  CARD: Regular rate and rhythm, no murmurs  RESP: No accessory muscle use; no wheezes, rhonchi, or rales  ABD: Soft; non-distended; non-tender.   MUSCULOSKELETAL/EXTREMITIES: strength 3/5 in LE b/l. 4/5 in UE b/l. FROM in all four extremities.  SKIN: Warm; dry; no apparent lesions or exudate. AVF in left AC.

## 2023-10-05 NOTE — ED ADULT NURSE NOTE - NS TRANSFER DISPOSITION PATIENT BELONGINGS
Update History & Physical    The patient's History and Physical of July 31, 2023 was reviewed with the patient and I examined the patient. There was no response to the Medrol dose pack. Excision was accepted by her as well. . The surgical site was confirmed by the patient and me. Plan: The risks, benefits, expected outcome, and alternative to the recommended procedure have been discussed with the patient. Patient understands and wants to proceed with the procedure.      Electronically signed by William Rios MD on 8/25/2023 at 6:49 AM
with patient

## 2023-10-05 NOTE — PATIENT PROFILE ADULT - FALL HARM RISK - HARM RISK INTERVENTIONS

## 2023-10-05 NOTE — CONSULT NOTE ADULT - ASSESSMENT
62M w/ PMH of HTN, ESRD on HD TIW (MWF) at [facility] [place], chronic pancreatitis, and Hep C who presents to the ED complaining of abdominal pain and weakness. Nephrology was consulted for management of ESRD on HD.

## 2023-10-05 NOTE — CONSULT NOTE ADULT - PROBLEM SELECTOR RECOMMENDATION 9
Pt with ESRD on HD TIW (MWF). Last HD treatment was on 10/4. Tolerated treatment well. Gets HD 3x a week at [facility] [place]. Makes very little urine. Has been on HD for 3 months via LUE AVF. Pt clinically stable. Labs reviewed. Plan for HD today after CT abd/pelv with IV contrast. Consent obtained and placed in the chart. Monitor BP and labs.      If you have any questions, please feel free to contact me.  Yovanny Apraicio  Nephrology Fellow  [id number] / [employer] Teams (Preferred)  (After 4pm or on weekends, please call the on-call Fellow)

## 2023-10-05 NOTE — ED ADULT NURSE NOTE - NSFALLRISKINTERV_ED_ALL_ED

## 2023-10-05 NOTE — CONSULT NOTE ADULT - SUBJECTIVE AND OBJECTIVE BOX
[facility] DIVISION OF KIDNEY DISEASES AND HYPERTENSION -- [phone]  -- INITIAL CONSULT NOTE  --------------------------------------------------------------------------------  HPI: 62M w/ PMH of HTN, ESRD on HD TIW (MWF) at [facility] [place], chronic pancreatitis, and Hep C who presents to the ED complaining of abdominal pain and weakness. Nephrology was consulted for management of ESRD on HD.    Last HD treatment was on 10/4. Tolerated treatment well. Gets HD 3x a week at [facility] [place]. Makes very little urine. Has been on HD for 3 months via LUE AVF.    Pt seen and evaluated at bedside in the ED. Complaining of abdominal pain. Denies feves/chills, HA, CP, SOB, leg swelling.     PAST HISTORY  --------------------------------------------------------------------------------  PAST MEDICAL & SURGICAL HISTORY:  ETOH abuse  sober x1 year approximately  Hepatitis C  treated  Gout  HTN (hypertension)  H/O chronic pancreatitis  Wound of right foot  ESRD on hemodialysis  DVT (deep venous thrombosis)  Gastric perforation    FAMILY HISTORY:  FH: HTN (hypertension)    PAST SOCIAL HISTORY:  Previous smoker    ALLERGIES & MEDICATIONS  --------------------------------------------------------------------------------  Allergies  Tylenol (Other)    Intolerances    Standing Inpatient Medications  PRN Inpatient Medicationsoxycodone    5 mG/acetaminophen 325 mG 1 Tablet(s) Oral every 6 hours PRN  oxycodone    5 mG/acetaminophen 325 mG 2 Tablet(s) Oral every 6 hours PRN    REVIEW OF SYSTEMS  --------------------------------------------------------------------------------  As per HPI    VITALS/PHYSICAL EXAM  --------------------------------------------------------------------------------  T(C): 36.7 (10-05-23 @ 12:46), Max: 37.1 (10-05-23 @ 03:16)  HR: 72 (10-05-23 @ 12:46) (72 - 92)  BP: 135/85 (10-05-23 @ 12:46) (135/85 - 155/93)  RR: 14 (10-05-23 @ 12:46) (14 - 16)  SpO2: 100% (10-05-23 @ 12:46) (100% - 100%)  Wt(kg): --  Height (cm): 185.4 (10-05-23 @ 03:16)    Physical Exam:  Gen: NAD, cachectic appearing  HEENT: MMM  Pulm: CTA B/L  CV: S1S2  Abd: Soft, +BS   Ext: No B/L LE edema  Neuro: Awake  Skin: Warm and dry  Vascular access: LUE AVF, skin intact, thrill felt, dressing    LABS/STUDIES  --------------------------------------------------------------------------------              11.2   6.41  >-----------<  62       [10-05-23 @ 06:00]              33.8     142  |  101  |  15  ----------------------------<  84      [10-05-23 @ 06:00]  3.0   |  24  |  2.69        Ca     7.6     [10-05-23 @ 06:00]    TPro  5.2  /  Alb  2.8  /  TBili  0.3  /  DBili  x   /  AST  144  /  ALT  163  /  AlkPhos  393  [10-05-23 @ 06:00]    PT/INR: PT 13.7 , INR 1.22       [10-05-23 @ 06:00]  PTT: 31.1       [10-05-23 @ 06:00]    Creatinine Trend:  SCr 2.69 [10-05 @ 06:00]    Iron 83, TIBC Unable to calculate Test Repeated, %sat Unable to calculate Test Repeated      [09-01-23 @ 06:55]  Ferritin 1002      [09-01-23 @ 06:55]  PTH -- (Ca --)      [12-22-22 @ 06:40]   290  Vitamin D (25OH) 10.4      [01-04-23 @ 06:45]  HbA1c 5.0      [03-22-19 @ 09:03]  TSH 1.640      [09-01-23 @ 06:55]    HBsAb 5.2      [08-25-23 @ 12:41]  HBsAb Reactive      [06-12-19 @ 11:41]  HBsAg Nonreact      [08-25-23 @ 12:41]  HBcAb Reactive      [01-28-23 @ 17:41]  HCV 4.50, Weakly Reactive Hepatitis C AB  S/CO Ratio                        Interpretation  < 1.00                                   Non-Reactive  1.00 - 4.99                         Weakly-Reactive  >= 5.00                                Reactive  Non-Reactive: A person with a non-reactive HCV antibody result is  considered uninfected.  No further action is needed unless recent  infection is suspected.  In these cases, consider repeat testing later to  detect seroconversion..  Weakly-Reactive: HCV antibody test is abnormal, HCV RNA Qualitative test  will follow.  Reactive: HCV antibody test is abnormal, HCV RNA Qualitative test will  follow.  Note: HCV antibody testing is performed on the [employer]  system.      [08-25-23 @ 12:41]  HIV Nonreact      [11-25-22 @ 11:56]  HIV Nonreact      [04-06-22 @ 14:56]    JAZMIN: titer Negative, pattern --      [06-12-19 @ 11:42]  C3 Complement 61      [06-21-19 @ 10:32]  C4 Complement 37      [06-17-19 @ 13:26]  Rheumatoid Factor <10      [06-19-19 @ 15:50]  ASLO 57      [06-05-19 @ 11:01]  Free Light Chains: kappa 13.31, lambda 12.94, ratio = 1.03      [12-07 @ 05:35]  Immunofixation Serum: No Monoclonal Band Identified    Reference Range: None Detected      [12-07-22 @ 05:35]  SPEP Interpretation: Hypoalbuminemia      [12-07-22 @ 05:35]  Cryoglobulin: Negative      [06-19-19 @ 15:05]

## 2023-10-05 NOTE — ED ADULT NURSE NOTE - OBJECTIVE STATEMENT
pt received to room 16 a&ox4 states "does not ambulate at baseline." c/o SOB and "pain all over". pt abdomen nontender. pt with no swelling to extremities. pt pt received to room 16 a&ox4 states "does not ambulate at baseline uses wheelchair." c/o SOB and "pain all over". pt abdomen nontender. pt with no swelling to extremities. pt ESRD, monday wednesday friday with last treatment wednesday Left AV fistula. pt denies chest pain, sob, nausea, vomiting, dizziness, headache, fevers/chills. pt denies etoh use, illicit drug use. pt denies AH/TH/VH,. pt respirations even and unlabored with no accessory muscle use. 20g placed to the right forearm. labs collected and sent. pt medicated as per md orders. pt pending lab results and XR results. pt pending dispo. pt normal sinus on monitor.

## 2023-10-05 NOTE — ED ADULT TRIAGE NOTE - CHIEF COMPLAINT QUOTE
Pt c/o abd pain, SOB and body pain since yesterday. pt is a monday wed friday dialysis got full treatment yesterday with left AV fistula. No complaints of chest pain, headache, nausea, dizziness, vomiting  SOB, fever, chills verbalized.

## 2023-10-05 NOTE — PATIENT PROFILE ADULT - MEDICATIONS/VISITS
RT PROGRESS NOTE    VENT DAY# 4    CURRENT SETTINGS:   Vent Mode: CMV/AC  (Continuous Mandatory Ventilation/ Assist Control)  FiO2 (%): 40 %  Resp Rate (Set): 20 breaths/min  Tidal Volume (Set, mL): 400 mL  PEEP (cm H2O): 5 cmH2O  Resp: 20      PATIENT PARAMETERS:  PIP 20  Pplat:  17  Pmean:  9.1  Compliance: 31.5  SBT: NO  Secretions:  moderate thick white  02 Sats:  98%  BS: coarse/diminished    ETT SIZE 7.5 Secured at 23 cm at teeth/gums    Respiratory Medications: None       NOTE / SHIFT SUMMARY:   Patient on full vent support  overnight. No vent changes made. RT will continue to follow.    Candy Palmer, RT   no

## 2023-10-05 NOTE — ED ADULT NURSE REASSESSMENT NOTE - NS ED NURSE REASSESS COMMENT FT1
pt states does not have allergy to acetaminophen and takes percocet at home for pain. pt medicated as per md orders.

## 2023-10-05 NOTE — ED PROVIDER NOTE - OBJECTIVE STATEMENT
61 yo M with PMHx Alcohol Abuse, Chronic Pancreatitis, HCV (s/p Tx with SVR), COPD, Esophageal Stricture, DVT ( on Eliquis), ESRD (HD MWF), HTN, CHF, Chronic Pain ( Narc Dependant), c/o generalized abd pain with generalized weakness since yesterday. also endorses headache. denies NVD, fever. denies dizziness, numbness/tingling, blurry vision.

## 2023-10-05 NOTE — ED PROVIDER NOTE - ATTENDING APP SHARED VISIT CONTRIBUTION OF CARE
63 y/o M with h/o ETOH abuse, chronic alcoholic pancreatitis, HCV (s/p Tx with SVR), COPD not on home O2, DVT ( on Eliquis), ESRD (HD MWF last on Wed), HTN, opioid dependence p/w body pain.  Pt reports chronic pain to his abdomen and entire body, but worsening over the past day.  He is a generally poor historian and is unable to delineate any other significant historical features.  No known fever, vomiting, diarrhea, sob, cough.  Pt reports completing HD yesterday (wed).  Takes percocet for chronic pain - see istop in physical chart.    Chronically ill; appearing, cachectic, lying in stretcher, awake and alert, nontoxic.  AF/VSS.  NCAT with temporal wasting, dry mucosa.  Lungs cta bl.  Cards nl S1/S2, RRR, no MRG.  Abd soft diffusely tender to palpation no rebound or guarding.  No pedal edema or calf tenderness.  No focal neuro deficits.    Given sig med hx, including chronic pancreatitis with h/o complications such as necrosis will obtain CT a/p to eval for complicated chronic pancreatitis.  Worsening of chronic pain may also be due to underlying infection, metabolic disturbance, malnutrition.  Plan for labs, ekg, ct a/p, cxr, pain/symptomatic control, likely will require admission for pain control, ftt.

## 2023-10-06 NOTE — H&P ADULT - PROBLEM SELECTOR PLAN 1
c/w oxycodone PRN  given concern for overdose on prior hospitalization will need to determine appropriate outpatient treatment regimen  CT as above  c/w pancrealipase

## 2023-10-06 NOTE — H&P ADULT - PROBLEM SELECTOR PLAN 5
c/w apixaban, reportedly for chronic R brachial DVT per [employer] review (doppler from 1/26/23 noted acute DVT at that time, cannot find repeat doppler confirming chronicity in EMR)  will check repeat RUE Dopplers to determine continued need for full dose apixaban

## 2023-10-06 NOTE — DIETITIAN INITIAL EVALUATION ADULT - ADD RECOMMEND
Liberalize diet from renal to low sodium given nutritional status and low potassium/phosphorus. Continue w/ micronutrient supplementation. Monitor PO intake. Liberalize diet from renal to low sodium given nutritional status and low potassium/phosphorus. Consider bowel regimen while on pain meds. Continue w/ micronutrient supplementation. Monitor PO intake.

## 2023-10-06 NOTE — PROGRESS NOTE ADULT - SUBJECTIVE AND OBJECTIVE BOX
[facility] Division of Kidney Diseases & Hypertension  FOLLOW UP NOTE  [phone]--------------------------------------------------------------------------------    HPI: 62M w/ PMH of HTN, ESRD on HD TIW (MWF) at [facility] [place], chronic pancreatitis, and Hep C who presents to the ED complaining of abdominal pain and weakness. Nephrology was consulted for management of ESRD on HD.    24 hour events/subjective: Pt seen and evaluated at bedside. Still complaining of generalized pain. Denies fevers/chills, HA, CP, SOB, leg swelling.     PAST HISTORY  --------------------------------------------------------------------------------  No significant changes to PMH, PSH, FHx, SHx, unless otherwise noted    ALLERGIES & MEDICATIONS  --------------------------------------------------------------------------------  Allergies  Tylenol (Other)    Intolerances    Standing Inpatient Medications  apixaban 5 milliGRAM(s) Oral every 12 hours  multivitamin 1 Tablet(s) Oral daily  pancrelipase  (CREON  6,000 Lipase Units) 1 Capsule(s) Oral three times a day with meals  potassium chloride   Solution 40 milliEquivalent(s) Oral every 2 hours  potassium chloride  10 mEq/100 mL IVPB 10 milliEquivalent(s) IV Intermittent every 1 hour    PRN Inpatient Medications  oxyCODONE    IR 5 milliGRAM(s) Oral every 6 hours PRN  oxyCODONE    IR 2.5 milliGRAM(s) Oral every 6 hours PRN    REVIEW OF SYSTEMS  --------------------------------------------------------------------------------  As per HPI    VITALS/PHYSICAL EXAM  --------------------------------------------------------------------------------  T(C): 37.2 (10-06-23 @ 09:29), Max: 37.2 (10-06-23 @ 09:29)  HR: 88 (10-06-23 @ 09:29) (59 - 89)  BP: 167/78 (10-06-23 @ 09:29) (116/67 - 167/78)  RR: 16 (10-06-23 @ 09:29) (16 - 18)  SpO2: 99% (10-06-23 @ 09:29) (98% - 100%)  Wt(kg): --  Height (cm): 182.9 (10-05-23 @ 16:51)  Weight (kg): 40 (10-05-23 @ 16:51)  BMI (kg/m2): 12 (10-05-23 @ 16:51)  BSA (m2): 1.5 (10-05-23 @ 16:51)    10-05-23 @ 07:01  -  10-06-23 @ 07:00  --------------------------------------------------------  IN: 400 mL / OUT: 1500 mL / NET: -1100 mL    Physical Exam:  Gen: NAD, cachectic appearing  HEENT: MMM  Pulm: CTA B/L  CV: S1S2  Abd: Soft, +BS   Ext: No B/L LE edema  Neuro: Awake  Skin: Warm and dry  Vascular access: LUE AVF, skin intact, thrill felt, dressing    LABS/STUDIES  --------------------------------------------------------------------------------              11.6   6.37  >-----------<  57       [10-06-23 @ 06:40]              34.9     143  |  105  |  12  ----------------------------<  302      [10-06-23 @ 06:40]  2.6   |  23  |  2.27        Ca     7.6     [10-06-23 @ 06:40]      Mg     1.80     [10-06-23 @ 06:40]      Phos  2.3     [10-06-23 @ 06:40]    TPro  4.6  /  Alb  2.5  /  TBili  0.2  /  DBili  x   /  AST  115  /  ALT  144  /  AlkPhos  333  [10-06-23 @ 06:40]    PT/INR: PT 13.7 , INR 1.22       [10-05-23 @ 06:00]  PTT: 31.1       [10-05-23 @ 06:00]          [10-06-23 @ 06:40]    Creatinine Trend:  SCr 2.27 [10-06 @ 06:40]  SCr 2.69 [10-05 @ 06:00]    Urinalysis - [10-06-23 @ 06:40]      Color  / Appearance  / SG  / pH       Gluc 302 / Ketone   / Bili  / Urobili        Blood  / Protein  / Leuk Est  / Nitrite       RBC  / WBC  / Hyaline  / Gran  / Sq Epi  / Non Sq Epi  / Bacteria     Iron 45, TIBC 84, %sat 54      [10-06-23 @ 06:40]  Ferritin 1081      [10-06-23 @ 06:40]  TSH 1.19      [10-06-23 @ 06:40]    HBsAb 5.2      [10-05-23 @ 20:40]  HBsAg Nonreact      [10-05-23 @ 20:40]  HCV 5.37, Reactive      [10-05-23 @ 20:40]

## 2023-10-06 NOTE — DIETITIAN INITIAL EVALUATION ADULT - NS FNS DIET ORDER
Diet, Regular:   For patients receiving Renal Replacement - No Protein Restr, No Conc K, No Conc Phos, Low Sodium (RENAL) (10-05-23 @ 18:45)

## 2023-10-06 NOTE — DIETITIAN INITIAL EVALUATION ADULT - OTHER INFO
63 year old male with a PMH of alcohol abuse, chronic pancreatitis, HCV, COPD, esophageal stricture, ESRD (on HD), HTN, chronic systolic CHF, chronic pain (on chronic oxy/acetaminophen), history of emphysematous pancreatitis presenting from home with acute on chronic abdominal pain per chart.    Patient reporting fair appetite. Food preferences reviewed. No GI distress or chewing/swallowing difficulties. On CREON per chart. Has no food allergies. Per previous RD note (7/16) noted w/ weight 50.1 kg. ABW is 44.3 kg (10/5) *post HD per chart indicating a -11.6% weight loss x 3 months, significant. Likely due to increased nutritional needs in setting of ESRD and pancreatitis. No edema or pressure injuries noted per RN flow sheet. Noted w/ low potassium/phosphorus and is being replete per chart.    Nutrition education deferred at this time.

## 2023-10-06 NOTE — H&P ADULT - NSHPOUTPATIENTPROVIDERS_GEN_ALL_CORE
DEBORAH - Dr. Odom
Héctor Hernandez (MD)  Plastic Surgery; Surgery  [address], [address]  [place] [address]  Phone: [phone]  Fax: [phone]  Follow Up Time:

## 2023-10-06 NOTE — PROGRESS NOTE ADULT - ATTENDING COMMENTS
62M w/ PMH of HTN, ESRD on HD TIW (MWF) at [facility] [place], chronic pancreatitis, and Hep C who presents to the ED complaining of abdominal pain and weakness. Nephrology was consulted for management of ESRD on HD.  +abdominal pain.  Emaciated, poor appetite  1.  ESRD--concur with orders reviewed with fellow, HD RN  2.  Protein calorie malnutrition--dietary input for protein, calories.  Chronic pancreatitis related malabsorption, other.  On Creon.  WOULD NOT CONFINE TO NEPRO supplement.  ANY THAT HE'LL DRINK IN QUANTITY.  We'll dialyze if excessive protein and bind PO4.    3.  HYpokalemia--4 K dialysate, increased nutrition    discussed with med team  Wilfredo Cruz MD  contact me on TEAMS

## 2023-10-06 NOTE — H&P ADULT - PROBLEM SELECTOR PLAN 7
on apixaban, no further need for DVT ppx    #TWI on EKG - no chest pain, check trop with AM labs, repeat EKG in AM, low concern for ACS    #HA  -pain control   -patient reporting some magical thinking regarding etiology of headache, would consult psych in AM for further eval

## 2023-10-06 NOTE — BH CONSULTATION LIAISON ASSESSMENT NOTE - MSE UNSTRUCTURED FT
Mental Status Exam:  Demi: poorly groomed, poor hygiene     Behavior: calm, cooperative, no psychomotor retardation/agitation  Motor: no tremors, EPS, or rigidity  Gait: did not assess, pt in bed  Speech: normal rate, rhythm, prosedy and volume   Mood: "okay"  Affect: euthymic, congruent  Thought process: linear but perseverates on delusions  Thought Content: + paranoia, delusions   Perception: + AH no VH  SI: denies  HI: denies  Insight: poor  Judgment: poor    Cognitive Exam:  Orientation: AOx3  Recall: intact  Attention: intact  Abstraction: intact

## 2023-10-06 NOTE — PROGRESS NOTE ADULT - PROBLEM SELECTOR PLAN 1
Pt with ESRD on HD TIW (MWF). Last HD treatment was on 10/4. Tolerated treatment well. Gets HD 3x a week at [facility]. Makes very little urine. Has been on HD for 3 months via LUE AVF. Pt clinically stable. Labs reviewed. Received HD after IV contrast yesterday. Monitor BP and labs. Will do HD on 10/7 and then continue on MWF schedule.      If you have any questions, please feel free to contact me.  Yovanny Aparicio  Nephrology Fellow  [id number] / [employer] Teams (Preferred)  (After 4pm or on weekends, please call the on-call Fellow). Pt with ESRD on HD TIW (MWF). Last HD treatment was on 10/4. Tolerated treatment well. Gets HD 3x a week at [facility] [place]. Makes very little urine. Has been on HD for 3 months via LUE AVF. Pt clinically stable. Labs reviewed. Received HD after IV contrast yesterday. Monitor BP and labs. Will do HD on 10/7 and then continue on MWF schedule. No restriction from renal standpoint on type of diet/supplementation the patient is allowed to consume.      If you have any questions, please feel free to contact me.  Yovanny Aparicio  Nephrology Fellow  [phone] / [employer] Teams (Preferred)  (After 4pm or on weekends, please call the on-call Fellow).

## 2023-10-06 NOTE — H&P ADULT - HISTORY OF PRESENT ILLNESS
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 Patient reports that his doctor would not renew his pain medications and so he was in too much pain at home, presented for pain control. Patient reports the pain is epigastric and radiates around diffuse abdomen as well as to the back at times however is controlled with oxycodone. He reports diarrhea 5 days ago, since resolved. He denies any nausea or vomiting and reports he is tolerating food. He notes feeling subjective fevers and chills 3 days ago, reports dysuria, hematuria and increased urinary frequency when he does not have his oxycodone and also notes a bitemporal headache. The headache he states feels like somebody is causing the headache and continued to mention psychokinesis. He also reports occasional involuntary movements and states that it is related to a person that "calls himself the HDS, the homo downstairs." He denies AH/VH, denies any history of anxiety or depression.    In the ED VS: 98.7  72-92  132-155/77-93  14-16  100%RA, received NS 500cc IVF, acetaminophen 1g IVPB x1, famotidine 20mg IV x1, hydromorphone 1mg IV x2, morphine 4mg IV x1

## 2023-10-06 NOTE — PHYSICAL THERAPY INITIAL EVALUATION ADULT - ADDITIONAL COMMENTS
Pt lives with his mother in a house with 4 steps to enter. Pt is wheelchair bound and requires assistance with transfers and ADLs. Pt reports no falls in the past 6 months.   Pt left semi supine in bed in NAD, call ely in reach and [facility] Bob at bedside.

## 2023-10-06 NOTE — PROGRESS NOTE ADULT - PROBLEM SELECTOR PLAN 2
-appreciate renal recs  -states he produces minimal urine  -reports urinary complaints (hematuria, frequency, dysuria iso not taking opioids), check UA

## 2023-10-06 NOTE — PROGRESS NOTE ADULT - ATTENDING COMMENTS
62 yo M with hx EtOH abuse, chronic pancreatitis, HCV, COPD, esophageal stricture, prior DVT, ESRD on HD, HTN, chronic systolic HF, chronic pain recent hospitalization 8/2023 for AMS/hypoglycemia thought to be possibly opioid overdose requiring intubation, now coming in with pain in the setting of not receiving pain medications as PCP not refilling pain medications given concern for risks and recent overdose. Currently on PRN oxycodone for severe pain, monitoring pain and mental status closely. Pain management consulted, appreciate recommendations. Avoiding IV opioids at this time. Continue HD per renal. Pt seen and d/w HS team, remainder as above.

## 2023-10-06 NOTE — BH CONSULTATION LIAISON ASSESSMENT NOTE - NSBHCHARTREVIEWVS_PSY_A_CORE FT
Vital Signs Last 24 Hrs  T(C): 37.2 (06 Oct 2023 09:29), Max: 37.2 (06 Oct 2023 09:29)  T(F): 98.9 (06 Oct 2023 09:29), Max: 98.9 (06 Oct 2023 09:29)  HR: 88 (06 Oct 2023 09:29) (59 - 89)  BP: 167/78 (06 Oct 2023 09:29) (116/67 - 167/78)  BP(mean): --  RR: 16 (06 Oct 2023 09:29) (16 - 18)  SpO2: 99% (06 Oct 2023 09:29) (98% - 100%)    Parameters below as of 06 Oct 2023 09:29  Patient On (Oxygen Delivery Method): room air

## 2023-10-06 NOTE — PROGRESS NOTE ADULT - ASSESSMENT
62M w/ PMH of HTN, ESRD on HD TIW (MWF) at [facility] [place], chronic pancreatitis, and Hep C who presents to the ED complaining of abdominal pain and weakness. Nephrology was consulted for management of ESRD on HD.

## 2023-10-06 NOTE — DIETITIAN INITIAL EVALUATION ADULT - PERTINENT MEDS FT
MEDICATIONS  (STANDING):  apixaban 5 milliGRAM(s) Oral every 12 hours  multivitamin 1 Tablet(s) Oral daily  pancrelipase  (CREON  6,000 Lipase Units) 1 Capsule(s) Oral three times a day with meals  potassium chloride   Solution 40 milliEquivalent(s) Oral once    MEDICATIONS  (PRN):  oxyCODONE    IR 5 milliGRAM(s) Oral every 6 hours PRN Moderate to Severe Pain (4 - 10)  oxyCODONE    IR 2.5 milliGRAM(s) Oral every 6 hours PRN breakthrough pain

## 2023-10-06 NOTE — PROGRESS NOTE ADULT - PROBLEM SELECTOR PLAN 6
-BNP >87254, troponin 131  -clinically euvolemic and no CP, low concern for ACS; elevation possibly iso poor renal clearance  -f/u repeat EKG  -monitor I/Os, daily weights

## 2023-10-06 NOTE — DIETITIAN NUTRITION RISK NOTIFICATION - TREATMENT: THE FOLLOWING DIET HAS BEEN RECOMMENDED
Diet, Regular:   For patients receiving Renal Replacement - No Protein Restr, No Conc K, No Conc Phos, Low Sodium (RENAL) (10-05-23 @ 18:45) [Active]

## 2023-10-06 NOTE — CONSULT NOTE ADULT - SUBJECTIVE AND OBJECTIVE BOX
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 Patient reports that his doctor would not renew his pain medications and so he was in too much pain at home, presented for pain control. Patient reports the pain is epigastric and radiates around diffuse abdomen as well as to the back at times however is controlled with oxycodone. He reports diarrhea 5 days ago, since resolved. He denies any nausea or vomiting and reports he is tolerating food. He notes feeling subjective fevers and chills 3 days ago, reports dysuria, hematuria and increased urinary frequency when he does not have his oxycodone and also notes a bitemporal headache. The headache he states feels like somebody is causing the headache and continued to mention psychokinesis. He also reports occasional involuntary movements and states that it is related to a person that "calls himself the HDS, the homo downstairs." He denies AH/VH, denies any history of anxiety or depression.    In the ED VS: 98.7  72-92  132-155/77-93  14-16  100%RA, received NS 500cc IVF, acetaminophen 1g IVPB x1, famotidine 20mg IV x1, hydromorphone 1mg IV x2, morphine 4mg IV x1 (06 Oct 2023 01:08)      PAST MEDICAL & SURGICAL HISTORY:  ETOH abuse  sober x1 year approximately      Hepatitis C  treated      Gout      HTN (hypertension)      H/O chronic pancreatitis      Wound of right foot      ESRD on hemodialysis      DVT (deep venous thrombosis)      Gastric perforation      AVF (arteriovenous fistula)          FAMILY HISTORY:  FH: HTN (hypertension)        SOCIAL HISTORY:  [ ] Denies Smoking, Alcohol, or Drug Use    Allergies    Tylenol (Other)    Intolerances        PAIN MEDICATIONS:  OLANZapine 2.5 milliGRAM(s) Oral at bedtime  OLANZapine Injectable 2.5 milliGRAM(s) IntraMuscular every 6 hours PRN  oxyCODONE    IR 5 milliGRAM(s) Oral every 6 hours PRN  oxyCODONE    IR 2.5 milliGRAM(s) Oral every 6 hours PRN      Heme:  enoxaparin Injectable 40 milliGRAM(s) SubCutaneous every 24 hours    Antibiotics:    Cardiovascular:    GI:  pancrelipase  (CREON  6,000 Lipase Units) 1 Capsule(s) Oral three times a day with meals    Endocrine:    All Other Medications:  multivitamin 1 Tablet(s) Oral daily  potassium chloride   Solution 40 milliEquivalent(s) Oral every 2 hours  potassium chloride  10 mEq/100 mL IVPB 10 milliEquivalent(s) IV Intermittent every 1 hour  thiamine IVPB 500 milliGRAM(s) IV Intermittent three times a day          Vital Signs Last 24 Hrs  T(C): 37.2 (06 Oct 2023 09:29), Max: 37.2 (06 Oct 2023 09:29)  T(F): 98.9 (06 Oct 2023 09:29), Max: 98.9 (06 Oct 2023 09:29)  HR: 88 (06 Oct 2023 09:29) (59 - 89)  BP: 167/78 (06 Oct 2023 09:29) (116/67 - 167/78)  BP(mean): --  RR: 16 (06 Oct 2023 09:29) (16 - 18)  SpO2: 99% (06 Oct 2023 09:29) (99% - 100%)    Parameters below as of 06 Oct 2023 09:29  Patient On (Oxygen Delivery Method): room air        PAIN SCORE:   8/10      SCALE USED: (1-10 VNRS)             PHYSICAL EXAM:    GENERAL: NAD, well-groomed, well-developed    LABS:                          11.6   6.37  )-----------( 57       ( 06 Oct 2023 06:40 )             34.9     10-06    143  |  105  |  12  ----------------------------<  302<H>  2.6<LL>   |  23  |  2.27<H>    Ca    7.6<L>      06 Oct 2023 06:40  Phos  2.3     10-06  Mg     1.80     10-06    TPro  4.6<L>  /  Alb  2.5<L>  /  TBili  0.2  /  DBili  x   /  AST  115<H>  /  ALT  144<H>  /  AlkPhos  333<H>  10-06    PT/INR - ( 05 Oct 2023 06:00 )   PT: 13.7 sec;   INR: 1.22 ratio         PTT - ( 05 Oct 2023 06:00 )  PTT:31.1 sec  Urinalysis Basic - ( 06 Oct 2023 06:40 )    Color: x / Appearance: x / SG: x / pH: x  Gluc: 302 mg/dL / Ketone: x  / Bili: x / Urobili: x   Blood: x / Protein: x / Nitrite: x   Leuk Esterase: x / RBC: x / WBC x   Sq Epi: x / Non Sq Epi: x / Bacteria: x        Patient seen at bedside. Complaining of mid abdominal and epigastric pain radiating to all over his body. Patient states the pain is constant burning and achy. Patient reports that he was taking Percocet 10mg Q4H for chronic pancreatitis and back pain until 3 days ago. Patient states he was admitted at [facility] in August his outpatient pain management MD stopped prescribing him opioids since the hospital records had suspicion for opioid overdose during that admission. Patient states he did not overdose on opioids that time and he remember coming back from dialysis and passing out and felt like “something/ someone hit him”. Patient states he ran out of Percocet 3 days ago and started having severe abdominal pain. Patient states usually with flare ups he gets hospitalized and require IV Morphine or Dilaudid for pain relief. Patient denies feeling withdrawal symptoms including runny nose, diarrhea but on assessment noted runny nose and teary eyes. Patient states he was given Oxycodone 2.5mg which did not help with the pain. Denies smoking and illicit drug use. Reports alcohol use in the past states he cannot recollect when he last had a drink. Patient alert and orientedx4. Maintains eye contact. Not in any apparent distress.   RECOMMENDATIONS:  1) Recommend PO Gabapentin 100mg at bedtime. Hold for over sedation.  2) Recommend discontinuing current orders for Oxycodone instead order:  PO Oxycodone 10mg Q4H PRN for moderate to severe pain. Hold for over sedation. Not to be given within one hour of any other immediate acting opioid.  3) Recommend Lidocaine patch to abdomen. 12 hours ON/12 hours OFF X 5 days.  4) Recommend Addiction Psychiatry consult. Patient with recent admission to hospital for opioid overdose.  5) Recommend follow up with outpatient pain management MD upon discharge.  I STOP Reviewed and found:  Oxycodone-acetaminophen 5-325mg tablets, quantity of 240 pills for 20 days. Last dispensed on 09/13/23.  Discussed patient with chronic pain attending Dr. Spaulding who agrees with the above plan.  Pain service to sign off. Please call chronic pain service if further assistance needed with pain management.

## 2023-10-06 NOTE — PROGRESS NOTE ADULT - PROBLEM SELECTOR PLAN 5
-on apixaban, reportedly for chronic R brachial DVT per [employer] review (doppler from 1/26/23 noted acute DVT at that time, cannot find repeat doppler confirming chronicity in EMR)  -will f/u repeat RUE Dopplers to determine continued need for full dose apixaban, as pt has been appropriately treated

## 2023-10-06 NOTE — PROVIDER CONTACT NOTE (CRITICAL VALUE NOTIFICATION) - DATE AND TIME:
"Patient: Rylee Pennington  YOB: 1960 61 y.o. female  Medical Record Number: [id number]    Chief Complaints:   Chief Complaint   Patient presents with   • Left Hip - Initial Evaluation, Pain       History of Present Illness:Rylee Pennington is a 61 y.o. female who presents with complaints of bilateral hip pain left.  The right.  She reports that the pain is a severe sharp stabbing type pain worse with any standing walking, only slightly better with rest.  Patient had previous forgot a cortisone injection with only temporary relief.    Allergies: No Known Allergies    Medications:   Current Outpatient Medications   Medication Sig Dispense Refill   • ibuprofen (ADVIL,MOTRIN) 800 MG tablet Take 800 mg by mouth Every 6 (Six) Hours As Needed for Mild Pain .     • predniSONE (DELTASONE) 10 MG tablet 60 mg daily x 3 days, 40 mg daily x 3 days, 20 mg daily x 3 days, 10 mg daily x 3 days 39 tablet 0     No current facility-administered medications for this visit.         The following portions of the patient's history were reviewed and updated as appropriate: allergies, current medications, past family history, past medical history, past social history, past surgical history and problem list.    Review of Systems:   A 14 point review of systems was performed. All systems negative except pertinent positives/negative listed in HPI above    Physical Exam:   Vitals:    03/17/22 0815   Weight: 115 kg (253 lb 1.6 oz)   Height: 162.6 cm (64\")       General: A and O x 3, ASA, NAD    SCLERA:    Normal    Body mass index is 43.44 kg/m².   Skin clear no unusual lesions noted  Bilateral hips patient is nontender palpation she has severe pain with internal X rotation with a positive Stinchfield positive logroll calf soft and nontender    Radiology:  Xrays 2 views of bilateral hips were ordered and reviewed today secondary to pain show bone-on-bone end-stage osteoarthritis with cyst and spur formation.  Compared to views show " 06-Oct-2023 15:29 definite progression in arthritic changes    Assessment/Plan: EndStage osteoarthritis bilateral hips    Patient discussed options, Dr. Parisi also saw the patient.  She would like to proceed with left total hip replacement posterior approach overnight stay  Continuation of conservative management vs. JESS discussed.  The patient wishes to proceed with total hip replacement.  At this point the patient has failed the full gamut of conservative treatment and stating complete understanding of the risks/benefits/ anternatives wishes to proceed with surgical treatment.    Risk and benefits of surgery were reviewed.  Including, but not limited to, blood clots, anesthesia risk, infection, leg length discrepancy, fracture, skin/leg numbness, failure of the implant, need for future surgeries, continued pain, hematoma, need for transfusion, and death, among others.  The patient understands and wishes to proceed.     The spectrum of treatment options were discussed with the patient in detail including both the nonoperative and operative treatment modalities and their respective risks and benefits.  After thorough discussion, the patient has elected to undergo surgical treatment.  The details of the surgical procedure were explained including the location of probable incisions and a description of the likely implants to be used.  Models and diagrams were used as educational resources. The patient understands the likely convalescence after surgery, as well as the rehabilitation required.  We thoroughly discussed the risks, benefits, and alternatives to surgery.  The risks include but are not limited to the risk of infection, joint stiffness, blood clots (including DVT and/or pulmonary embolus along with the risk of death), neurologic and/or vascular injury, fracture, dislocation, nonunion, malunion, need for further surgery including hardware failure requiring revision, and continued pain.  It was explained that if tissue has been  repaired or reconstructed, there is also a chance of failure which may require further management.  Following the completion of the discussion, the patient expressed understanding of this planned course of care, all their questions were answered and consent will be obtained preoperatively.    Operative Plan: Posterior approach Total Hip Replacement  Outpatient          Kiersten Crenshaw, APRN  3/17/2022

## 2023-10-06 NOTE — BH CONSULTATION LIAISON ASSESSMENT NOTE - CURRENT MEDICATION
MEDICATIONS  (STANDING):  enoxaparin Injectable 40 milliGRAM(s) SubCutaneous every 24 hours  multivitamin 1 Tablet(s) Oral daily  pancrelipase  (CREON  6,000 Lipase Units) 1 Capsule(s) Oral three times a day with meals  potassium chloride   Solution 40 milliEquivalent(s) Oral every 2 hours  potassium chloride  10 mEq/100 mL IVPB 10 milliEquivalent(s) IV Intermittent every 1 hour    MEDICATIONS  (PRN):  oxyCODONE    IR 5 milliGRAM(s) Oral every 6 hours PRN Moderate to Severe Pain (4 - 10)  oxyCODONE    IR 2.5 milliGRAM(s) Oral every 6 hours PRN breakthrough pain

## 2023-10-06 NOTE — H&P ADULT - NSHPPHYSICALEXAM_GEN_ALL_CORE
Vital Signs Last 24 Hrs  T(C): 36.4 (05 Oct 2023 23:45), Max: 37.1 (05 Oct 2023 03:16)  T(F): 97.5 (05 Oct 2023 23:45), Max: 98.7 (05 Oct 2023 03:16)  HR: 59 (05 Oct 2023 23:45) (59 - 92)  BP: 116/67 (05 Oct 2023 23:45) (116/67 - 155/93)  RR: 18 (05 Oct 2023 23:45) (14 - 18)  SpO2: 99% (05 Oct 2023 16:51) (98% - 100%)    Parameters below as of 05 Oct 2023 23:45  Patient On (Oxygen Delivery Method): room air    PHYSICAL EXAM:  GENERAL: NAD, cachectic   HEAD:  Atraumatic, bitemporal wasting  EYES: EOMI, PERRL, conjunctiva and sclera clear  NECK: Supple, No JVD  CHEST/LUNG: Clear to auscultation bilaterally; No wheezes, rales or rhonchi; normal work of breathing, speaking in full sentences; occasional wet cough  HEART: Regular rate and rhythm; No murmurs, rubs, or gallops, (+)S1, S2  ABDOMEN: Soft, Nontender, Nondistended; Normal Bowel sounds   EXTREMITIES:  2+ Peripheral Pulses, No clubbing, cyanosis, or edema  PSYCH: normal mood and affect, A&Ox3, see HPI for more info. Denies anxiety/depression, denies AH/VH  NEUROLOGY: no focal neuro deficits, 3/5 strength x4 extremities, decreased sensation to light touch b/l feet, CN II-XII intact  SKIN: No rashes or lesions

## 2023-10-06 NOTE — PHYSICAL THERAPY INITIAL EVALUATION ADULT - REHAB POTENTIAL, PT EVAL
good, to achieve stated therapy goals lab results/return to ED if symptoms worsen, persist or questions arise/need for outpatient follow-up/radiology results

## 2023-10-06 NOTE — PROGRESS NOTE ADULT - PROBLEM SELECTOR PLAN 1
-c/w oxycodone PRN  -pain management c/s appreciated  -CT as above  -c/w pancrealipase  -psych c/s iso pt stating someone else is causing him this pain, magical thinking

## 2023-10-06 NOTE — BH CONSULTATION LIAISON ASSESSMENT NOTE - SUMMARY
61-yo AAM, single, reportedly domiciled with his mother, patient denying any prior psychiatric history (was treated in custodial 25 years ago with haldol for unknown symptoms/diagnosis and never followed up after release from custodial) and history of alcohol abuse (reports quitting "long time ago"), PMH of HCV, Hx of esophageal stricture (S/p stenting in 5/22), Emphysema, chronic pancreatitis, multiple prior medical hospitalizations for confusion, pancreatitis, etc., psych c/s for psychosis.    Patient exhibiting estella psychosis - has delusions, paranoia, referential thoughts, thought insertion, grandose/hyperreligiousness. Given CKD and HD, concern for lewy body dementia and avoidance of EPS, would begin Olanzapine though must monitor QTc and LFTs which are already somewhat elevated. Needs further psych assessment. Cognitively intact despite worse psychosis than prior hospitalizations. No current signs of etoh withdrawal despite 0 etoh, no current role for CIWA unless such features abruptly begin.     Recs:  - no current need for 1:1  - cannot leave AMA needs further psych eval, patient psychotic  - Begin Olanzapine 2.5mg qHS standing - can help with appetite stimulation, hiccups, sleep as well  - PRN for severe agitation Olanzapine 2.5mg q6h PRN PO/IM  - Consider empiric thiamine repletion 500mg IV TID for 3d  - Consider CT head, patient's mental status much worse than prior hospitalizations  - Psych will follow

## 2023-10-06 NOTE — PHYSICAL THERAPY INITIAL EVALUATION ADULT - PERTINENT HX OF CURRENT PROBLEM, REHAB EVAL
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 Patient reports that his doctor would not renew his pain medications and so he was in too much pain at home, presented for pain control. Patient reports the pain is epigastric and radiates around diffuse abdomen as well as to the back at times however is controlled with oxycodone. He reports diarrhea 5 days ago, since resolved. He denies any nausea or vomiting and reports he is tolerating food. He notes feeling subjective fevers and chills 3 days ago, reports dysuria, hematuria and increased urinary frequency when he does not have his oxycodone and also notes a bitemporal headache. The headache he states feels like somebody is causing the headache and continued to mention psychokinesis. He also reports occasional involuntary movements and states that it is related to a person that "calls himself the HDS, the homo downstairs." He denies AH/VH, denies any history of anxiety or depression.

## 2023-10-06 NOTE — H&P ADULT - NSHPREVIEWOFSYSTEMS_GEN_ALL_CORE
REVIEW OF SYSTEMS:    CONSTITUTIONAL: (+) subjective fevers/chills 3d ago  EYES/ENT: No visual changes; No dysphagia; No sore throat; No rhinorrhea; No sinus pain/pressure  NECK: No pain or stiffness  RESPIRATORY: (+) cough productive of white sputum; No wheezing or hemoptysis; No shortness of breath  CARDIOVASCULAR: No chest pain or palpitations; No lower extremity edema  GASTROINTESTINAL: (+)abdominal/epigastric pain. No nausea, vomiting, or hematemesis; (+)recent diarrhea 5d ago, resolved; No constipation. No melena; Reports BRB w/stool last week, resolved, last colonoscopy approximately 1 year ago reportedly wnl  GENITOURINARY: (+) dysuria, frequency, hematuria  NEUROLOGICAL: (+)neuropathy b/l feet; (+) generalized weakness; (+) bitemporal HA; No LH/dizziness  MSK: ambulates with cane/walker; no recent falls  SKIN: No itching, burning, rashes, or lesions   All other review of systems is negative unless indicated above.

## 2023-10-06 NOTE — DIETITIAN INITIAL EVALUATION ADULT - ORAL INTAKE PTA/DIET HISTORY
Patient seen for assessment. Limited information obtained at this time from patient due to pain. No issues w/ appetite reported. Other information obtained from chart review.

## 2023-10-06 NOTE — H&P ADULT - NSHPLABSRESULTS_GEN_ALL_CORE
11.2   6.41  )-----------( 62       ( 05 Oct 2023 06:00 )             33.8     142  |  101  |  15  ----------------------------<  84     10-05  3.0<L>   |  24  |  2.69<H>    Ca    7.6<L>      05 Oct 2023 06:00    TPro  5.2<L>  /  Alb  2.8<L>  /  TBili  0.3  /  DBili  x   /  AST  144<H>  /  ALT  163<H>  /  AlkPhos  393<H>  10-05    Lipase: 12 U/L (10.05.23 @ 06:00)    PT/INR: 13.7/1.22 (10-05-23 @ 06:00)  PTT: 31.1 (10-05-23 @ 06:00)    06:00 - VBG - pH: 7.40  | pCO2: 49    | pO2: 60    | Lactate: 1.8     Acetaminophen Level, Serum: <10 ug/mL (10.05.23 @ 06:00)  Salicylate Level, Serum: <0.3 mg/dL (10.05.23 @ 06:00)  Alcohol, Blood: <10 mg/dL (10.05.23 @ 06:00)    Rapid RVP Result: NotDetec (10.05.23 @ 06:58)  SARS-CoV-2: NotDetec (10.05.23 @ 06:58)    < from: CT Abdomen and Pelvis w/ IV Cont (10.05.23 @ 09:57) >  LOWER CHEST: Very small bilateral pleural effusions with adjacent compressive atelectasis. Interval development of Mild bronchiolectasis with adjacent bilobed bandlike opacity is seen at the left lung base may represent developing pneumonia and/or a degree of atelectasis. The prior right lung base patchy nodular opacity appears resolved. Again noted is wall thickening of the distal esophagus without significant change from prior exams.  LIVER: Within normal limits. No focal hepatic lesion.  BILE DUCTS: No intrahepatic biliary dilatation. The common bile duct measures maximum of 9 mm, without significant change as compared to the prior exam however  tapers to the level of the pancreatic head. No definitive pancreatic ductal dilatation.  GALLBLADDER: Within normal limits.  SPLEEN: Within normal limits.  PANCREAS: Extensive calcifications are seen throughout the pancreas consistent with chronic pancreatitis. No focal mass lesions are appreciated. The prior few punctate foci of pancreatic air have resolved.   ADRENALS: Within normal limits.  KIDNEYS/URETERS: The kidneys are atrophic however demonstrate symmetric enhancement. Again noted within the left upper pole is a cystic focus without change. Two  additional subcentimeter hypodensities are seen within the left upper pole and left lower pole, too small to characterize. No evidence of hydronephrosis.  BLADDER: Within normal limits.  REPRODUCTIVE ORGANS: Within normal limits.  BOWEL: No bowel obstruction. Appendix is not visualized.  PERITONEUM: Ascites is seen throughout the abdomen and pelvis.  VESSELS: The hepatic veins, upper abdominal IVC, portal veins, SMV are patent. The abdominal aorta and iliac arteries normal in caliber with minimal scattered atherosclerotic calcifications in the distal abdominal aorta. The celiac axis and its branches appear patent. The SMA is patent  RETROPERITONEUM/LYMPH NODES: No lymphadenopathy.  ABDOMINAL WALL: Lack of abdominal and pelvic fat/cachexia again noted. Subcutaneous edema are seen throughout the abdomen and pelvis.  BONES: Within normal limits.  IMPRESSION: Sequela of chronic pancreatitis with mild common bile duct dilatation unchanged. The prior peripancreatic foci of air have resolved. No bowel obstruction or evidence of mesenteric ischemia. No evidence of mesenteric artery, portal vein and SMA SMV thrombus. Ascites and subcutaneous edema. Renal atrophy. No hydronephrosis. Please read above. Cachexia. Please read above.  < end of copied text >    < from: Xray Chest 1 View- PORTABLE-Urgent (Xray Chest 1 View- PORTABLE-Urgent .) (10.05.23 @ 07:12) >  Mildly limited evaluation due to insufficient technique. The heart size is normal.  IMPRESSION: Nondiagnostic chest x-ray.  < end of copied text >    EKG personally reviewed and interpreted - EKG personally reviewed and interpreted - NSR 99bpm, Q in V1-V2, QTc 693ms, repeat post HD w/QTc 477ms, aVL, TWI V3-V4

## 2023-10-06 NOTE — H&P ADULT - PROBLEM SELECTOR PLAN 3
#macrocytic anemia  check B12, folate given cachexia  check TSH  check ferritin, iron/TIBC  check LDH/hapto given schistocytes noted on diff  trend H/H  s/p 1units pRBC 9/02/2023  #thrombocytopenia   evaluated by heme on prior admission, thought to be multifactorial   chronic/stable  monitor/trend

## 2023-10-06 NOTE — DIETITIAN INITIAL EVALUATION ADULT - PERTINENT LABORATORY DATA
10-06    143  |  105  |  12  ----------------------------<  302<H>  2.6<LL>   |  23  |  2.27<H>    Ca    7.6<L>      06 Oct 2023 06:40  Phos  2.3     10-06  Mg     1.80     10-06    TPro  4.6<L>  /  Alb  2.5<L>  /  TBili  0.2  /  DBili  x   /  AST  115<H>  /  ALT  144<H>  /  AlkPhos  333<H>  10-06

## 2023-10-06 NOTE — PROGRESS NOTE ADULT - PROBLEM SELECTOR PLAN 3
#macrocytic anemia  B12 1777, folate 7.5  TSH 1.19  ferritin 1081  , hapto   trend H/H  s/p 1units pRBC 9/02/2023  #thrombocytopenia   evaluated by heme on prior admission, thought to be multifactorial   chronic/stable  monitor/trend

## 2023-10-06 NOTE — H&P ADULT - ASSESSMENT
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

## 2023-10-06 NOTE — BH CONSULTATION LIAISON ASSESSMENT NOTE - NSBHROSSYSTEMS_PSY_ALL_CORE
Accession: [id number]

Exam: XR CHEST 2 VIEW X-RAY

 

IMPRESSION: Negative chest

 

[facility]

 

SITE ID: [id number]
Psychiatric

## 2023-10-06 NOTE — BH CONSULTATION LIAISON ASSESSMENT NOTE - HPI (INCLUDE ILLNESS QUALITY, SEVERITY, DURATION, TIMING, CONTEXT, MODIFYING FACTORS, ASSOCIATED SIGNS AND SYMPTOMS)
61-yo AAM, single, reportedly domiciled with his mother, patient denying any prior psychiatric history (was treated in [facility] 25 years ago with haldol for unknown symptoms/diagnosis and never followed up after release from [facility]) and history of alcohol abuse (reports quitting "long time ago"), PMH of HCV, Hx of esophageal stricture (S/p stenting in 5/22), Emphysema, chronic pancreatitis, multiple prior medical hospitalizations for confusion, pancreatitis, etc., psych c/s for psychosis.    Patient calm eating food in a strange fashion, states mood is "good" denies SI/I/P or HI/I/P, future oriented, eating is somewhat poor because he feels like "someone else is swallowing for me", oddly related around this matter. Sleeping poorly. Admits to thought insertion that "they" are putting ideas in his head "through silence", also has referential thoughts from the TV that Zeyad Lubin and Sam Dietz are simultaneously his uncle and God, and that he himself is God as well. States that all of his current symptoms including the pancreatitis are caused by "them".

## 2023-10-06 NOTE — PROGRESS NOTE ADULT - SUBJECTIVE AND OBJECTIVE BOX
PROGRESS NOTE:     Patient is a 63y old  Male who presents with a chief complaint of Abdominal pain     (06 Oct 2023 11:25)      SUBJECTIVE / OVERNIGHT EVENTS: No overnight events. Pt states he is experiencing abdominal pain and generalized pain every iso not taking opioid medication at home.     ADDITIONAL REVIEW OF SYSTEMS: 10 point ROS negative except per HPI    MEDICATIONS  (STANDING):  apixaban 5 milliGRAM(s) Oral every 12 hours  multivitamin 1 Tablet(s) Oral daily  pancrelipase  (CREON  6,000 Lipase Units) 1 Capsule(s) Oral three times a day with meals  potassium chloride   Solution 40 milliEquivalent(s) Oral every 2 hours  potassium chloride  10 mEq/100 mL IVPB 10 milliEquivalent(s) IV Intermittent every 1 hour    MEDICATIONS  (PRN):  oxyCODONE    IR 5 milliGRAM(s) Oral every 6 hours PRN Moderate to Severe Pain (4 - 10)  oxyCODONE    IR 2.5 milliGRAM(s) Oral every 6 hours PRN breakthrough pain      CAPILLARY BLOOD GLUCOSE        I&O's Summary    05 Oct 2023 07:01  -  06 Oct 2023 07:00  --------------------------------------------------------  IN: 400 mL / OUT: 1500 mL / NET: -1100 mL        PHYSICAL EXAM:  Vital Signs Last 24 Hrs  T(C): 37.2 (06 Oct 2023 09:29), Max: 37.2 (06 Oct 2023 09:29)  T(F): 98.9 (06 Oct 2023 09:29), Max: 98.9 (06 Oct 2023 09:29)  HR: 88 (06 Oct 2023 09:29) (59 - 89)  BP: 167/78 (06 Oct 2023 09:29) (116/67 - 167/78)  BP(mean): --  RR: 16 (06 Oct 2023 09:29) (16 - 18)  SpO2: 99% (06 Oct 2023 09:29) (98% - 100%)    Parameters below as of 06 Oct 2023 09:29  Patient On (Oxygen Delivery Method): room air        CONSTITUTIONAL: NAD, cachectic, A&Ox3 to person, place, time.  RESPIRATORY: Normal respiratory effort; lungs are clear to auscultation bilaterally  CARDIOVASCULAR: Regular rate and rhythm, normal S1 and S2, no murmur/rub/gallop; No lower extremity edema; Peripheral pulses are 2+ bilaterally  ABDOMEN: Nontender to palpation, no rebound/guarding; No hepatosplenomegaly  MUSCLOSKELETAL: no clubbing or cyanosis of digits; no joint swelling or tenderness to palpation  NEURO: CN 2-12 grossly intact, moves all limbs spontaneously      LABS:                          11.6   6.37  )-----------( 57       ( 06 Oct 2023 06:40 )             34.9     10-06    143  |  105  |  12  ----------------------------<  302<H>  2.6<LL>   |  23  |  2.27<H>    Ca    7.6<L>      06 Oct 2023 06:40  Phos  2.3     10-06  Mg     1.80     10-06    TPro  4.6<L>  /  Alb  2.5<L>  /  TBili  0.2  /  DBili  x   /  AST  115<H>  /  ALT  144<H>  /  AlkPhos  333<H>  10-06    PT/INR - ( 05 Oct 2023 06:00 )   PT: 13.7 sec;   INR: 1.22 ratio         PTT - ( 05 Oct 2023 06:00 )  PTT:31.1 sec      -----    MICRO  Urinalysis Basic - ( 06 Oct 2023 06:40 )    Color: x / Appearance: x / SG: x / pH: x  Gluc: 302 mg/dL / Ketone: x  / Bili: x / Urobili: x   Blood: x / Protein: x / Nitrite: x   Leuk Esterase: x / RBC: x / WBC x   Sq Epi: x / Non Sq Epi: x / Bacteria: x        -----    TRENDS  Hemoglobin: 11.6 g/dL (10-06 @ 06:40)  Hemoglobin: 11.2 g/dL (10-05 @ 06:00)    Creatinine Trend: 2.27<--, 2.69<--  ----  RADIOLOGY & ADDITIONAL TESTS:  Results Reviewed:   Imaging Personally Reviewed:  Electrocardiogram Personally Reviewed:    COORDINATION OF CARE:  Care Discussed with Consultants/Other Providers [Y/N]:  Prior or Outpatient Records Reviewed [Y/N]:   PROGRESS NOTE:     Patient is a 63y old  Male who presents with a chief complaint of Abdominal pain (06 Oct 2023 11:25)    SUBJECTIVE / OVERNIGHT EVENTS: No overnight events. Pt states he is experiencing abdominal pain and generalized pain every iso not taking opioid medication at home.     ADDITIONAL REVIEW OF SYSTEMS: 10 point ROS negative except per HPI    MEDICATIONS  (STANDING):  apixaban 5 milliGRAM(s) Oral every 12 hours  multivitamin 1 Tablet(s) Oral daily  pancrelipase  (CREON  6,000 Lipase Units) 1 Capsule(s) Oral three times a day with meals  potassium chloride   Solution 40 milliEquivalent(s) Oral every 2 hours  potassium chloride  10 mEq/100 mL IVPB 10 milliEquivalent(s) IV Intermittent every 1 hour    MEDICATIONS  (PRN):  oxyCODONE    IR 5 milliGRAM(s) Oral every 6 hours PRN Moderate to Severe Pain (4 - 10)  oxyCODONE    IR 2.5 milliGRAM(s) Oral every 6 hours PRN breakthrough pain      CAPILLARY BLOOD GLUCOSE        I&O's Summary    05 Oct 2023 07:01  -  06 Oct 2023 07:00  --------------------------------------------------------  IN: 400 mL / OUT: 1500 mL / NET: -1100 mL        PHYSICAL EXAM:  Vital Signs Last 24 Hrs  T(C): 37.2 (06 Oct 2023 09:29), Max: 37.2 (06 Oct 2023 09:29)  T(F): 98.9 (06 Oct 2023 09:29), Max: 98.9 (06 Oct 2023 09:29)  HR: 88 (06 Oct 2023 09:29) (59 - 89)  BP: 167/78 (06 Oct 2023 09:29) (116/67 - 167/78)  BP(mean): --  RR: 16 (06 Oct 2023 09:29) (16 - 18)  SpO2: 99% (06 Oct 2023 09:29) (98% - 100%)    Parameters below as of 06 Oct 2023 09:29  Patient On (Oxygen Delivery Method): room air        CONSTITUTIONAL: NAD, cachectic, A&Ox3 to person, place, time.  RESPIRATORY: Normal respiratory effort; lungs are clear to auscultation bilaterally  CARDIOVASCULAR: Regular rate and rhythm, normal S1 and S2, no murmur/rub/gallop; No lower extremity edema; Peripheral pulses are 2+ bilaterally  ABDOMEN: Nontender to palpation, no rebound/guarding; No hepatosplenomegaly  MUSCLOSKELETAL: no clubbing or cyanosis of digits; no joint swelling or tenderness to palpation  NEURO: CN 2-12 grossly intact, moves all limbs spontaneously      LABS:                          11.6   6.37  )-----------( 57       ( 06 Oct 2023 06:40 )             34.9     10-06    143  |  105  |  12  ----------------------------<  302<H>  2.6<LL>   |  23  |  2.27<H>    Ca    7.6<L>      06 Oct 2023 06:40  Phos  2.3     10-06  Mg     1.80     10-06    TPro  4.6<L>  /  Alb  2.5<L>  /  TBili  0.2  /  DBili  x   /  AST  115<H>  /  ALT  144<H>  /  AlkPhos  333<H>  10-06    PT/INR - ( 05 Oct 2023 06:00 )   PT: 13.7 sec;   INR: 1.22 ratio         PTT - ( 05 Oct 2023 06:00 )  PTT:31.1 sec      -----    MICRO  Urinalysis Basic - ( 06 Oct 2023 06:40 )    Color: x / Appearance: x / SG: x / pH: x  Gluc: 302 mg/dL / Ketone: x  / Bili: x / Urobili: x   Blood: x / Protein: x / Nitrite: x   Leuk Esterase: x / RBC: x / WBC x   Sq Epi: x / Non Sq Epi: x / Bacteria: x        -----    TRENDS  Hemoglobin: 11.6 g/dL (10-06 @ 06:40)  Hemoglobin: 11.2 g/dL (10-05 @ 06:00)    Creatinine Trend: 2.27<--, 2.69<--  ----  RADIOLOGY & ADDITIONAL TESTS: Reviewed

## 2023-10-06 NOTE — DIETITIAN INITIAL EVALUATION ADULT - REASON
Unable to get consent, however noted w/ overt signs of severe muscle loss in temporal region and severe fat loss in buccal/orbital region per observation.

## 2023-10-07 NOTE — DISCHARGE NOTE PROVIDER - DETAILS OF MALNUTRITION DIAGNOSIS/DIAGNOSES
This patient has been assessed with a concern for Malnutrition and was treated during this hospitalization for the following Nutrition diagnosis/diagnoses:     -  10/06/2023: Severe protein-calorie malnutrition   -  10/06/2023: Underweight (BMI < 19)

## 2023-10-07 NOTE — DISCHARGE NOTE PROVIDER - NSDCCPTREATMENT_GEN_ALL_CORE_FT
PRINCIPAL PROCEDURE  Procedure: CT head  Findings and Treatment: FINDINGS: Multiple chronic bilateral basal ganglia: Infarctions on image   26 of series 2. Small subacute/chronic left frontal lobe cortical infarct   on image 43 of series 2  There is periventricular and subcortical white matter hypodensity without   mass effect, nonspecific, likely representing mild chronic microvascular   ischemic changes. There is no other compelling evidence for an acute   transcortical infarction. There is no evidence of mass, mass effect,   midline shift or extra-axial fluid collection. The lateral ventricles and   cortical sulci are age-appropriate in size and configuration. The orbits,   mastoid air cells and visualized paranasal sinuses are unremarkable. The   calvarium is intact. Consider MRI as clinically warranted.  IMPRESSION: Subacute/chronic left frontal lobe cortical infarct. Consider   MRI for further evaluation.        SECONDARY PROCEDURE  Procedure: CT abdomen  Findings and Treatment: IMPRESSION:  Sequela of chronic pancreatitis with mild common bile duct dilatation   unchanged. The prior peripancreatic foci of air have resolved.  No bowel obstruction or evidence of mesenteric ischemia.  No evidence of mesenteric artery, portal vein and SMA SMV thrombus  Ascites and subcutaneous edema.  Renal atrophy. No hydronephrosis. Please read above.  Cachexia       PRINCIPAL PROCEDURE  Procedure: CT head  Findings and Treatment: FINDINGS: Multiple chronic bilateral basal ganglia: Infarctions on image   26 of series 2. Small subacute/chronic left frontal lobe cortical infarct   on image 43 of series 2  There is periventricular and subcortical white matter hypodensity without   mass effect, nonspecific, likely representing mild chronic microvascular   ischemic changes. There is no other compelling evidence for an acute   transcortical infarction. There is no evidence of mass, mass effect,   midline shift or extra-axial fluid collection. The lateral ventricles and   cortical sulci are age-appropriate in size and configuration. The orbits,   mastoid air cells and visualized paranasal sinuses are unremarkable. The   calvarium is intact. Consider MRI as clinically warranted.  IMPRESSION: Subacute/chronic left frontal lobe cortical infarct. Consider   MRI for further evaluation.        SECONDARY PROCEDURE  Procedure: CT abdomen  Findings and Treatment: IMPRESSION:  Sequela of chronic pancreatitis with mild common bile duct dilatation   unchanged. The prior peripancreatic foci of air have resolved.  No bowel obstruction or evidence of mesenteric ischemia.  No evidence of mesenteric artery, portal vein and SMA SMV thrombus  Ascites and subcutaneous edema.  Renal atrophy. No hydronephrosis. Please read above.  Cachexia      Procedure: Transthoracic echocardiography (TTE)  Findings and Treatment: FINDINGS:     Left Ventricle:  The left ventricular cavity is normally sized. Left ventricular wall thickness is normal. Left ventricular systolic function is severely decreased with an ejection fraction visually estimated at 20 to 25%. There is global left ventricular hypokinesis. There is mild (grade 1) left ventricular diastolic dysfunction. There is no evidence of a left ventricular thrombus.     Right Ventricle:  The right ventricle is not well visualized. The right ventricular cavity is normal in size and normal systolic function. Tricuspid annular plane systolic excursion (TAPSE) is 2.9 cm (normal >=1.7 cm).     Left Atrium:  The left atrium is normal in size with an indexed volume of 29.01 ml/m².     Right Atrium:  The right atrium is normal in size.     Interatrial Septum:  Agitated saline injection was negative for intracardiac shunt.     Aortic Valve:  The aortic valve appears trileaflet with normal systolic excursion. There is calcification of the aortic valve leaflets. There is no aortic valve stenosis. There is moderate aortic regurgitation.     Mitral Valve:  There is calcification of the mitral valve annulus. There is moderate mitral regurgitation.     Tricuspid Valve:  Structurally normal tricuspid valve with normal leaflet excursion. There is trace tricuspid regurgitation.     Pulmonic Valve:  Structurally normal pulmonic valve with normal leaflet excursion. There is trace pulmonic regurgitation.     Aorta:  The aortic root appears normal in size.     Pericardium:  No pericardial effusion seen.     Systemic Veins:  The inferior vena cava is normal in size measuring 0.84 cm in diameter, (normal <2.1cm) with normal inspiratory collapse (normal >50%) consistent with normal right atrial pressure (~3, range 0-5mmHg).

## 2023-10-07 NOTE — PROGRESS NOTE ADULT - ATTENDING COMMENTS
62 yo M with hx EtOH abuse, chronic pancreatitis, HCV, COPD, esophageal stricture, prior DVT, ESRD on HD, HTN, chronic systolic HF, chronic pain recent hospitalization 8/2023 for AMS/hypoglycemia thought to be possibly opioid overdose requiring intubation, now coming in with pain in the setting of not receiving pain medications as PCP not refilling pain medications given concern for risks and recent overdose. Currently on PRN oxycodone for severe pain, monitoring pain and mental status closely. Pain management consulted, appreciate recommendations. Avoiding IV opioids at this time. Continue HD per renal.  CT head noted , noted to have Subacute/chronic left frontal lobe cortical infarct. Pt not on asa, statin, will monitor on Tele , will request Neurology eval , will request MRI . PT eval   H/h improved compared to last admission, will CTM  thrombocytopenia: chronic, will monitor   plan of care d/w pt  Rest of the management as above

## 2023-10-07 NOTE — DISCHARGE NOTE PROVIDER - NSDCFUSCHEDAPPT_GEN_ALL_CORE_FT
Dane Macedo  [facility]  GASTRO OP [id number] [facility]  Scheduled Appointment: 10/12/2023    [facility]  CARDIOLOGY [address]  Scheduled Appointment: 10/23/2023    Hector Odom  [facility]  INTMED OP [id number] [facility]  Scheduled Appointment: 10/31/2023     [facility]  CARDIOLOGY [address]  Scheduled Appointment: 10/23/2023    Hector Odom  [facility]  INTMED OP [id number] [facility]  Scheduled Appointment: 10/31/2023     Hector Odom  [facility]  INTMED OP [id number] [address]  Scheduled Appointment: 10/31/2023

## 2023-10-07 NOTE — DISCHARGE NOTE PROVIDER - NSDCMRMEDTOKEN_GEN_ALL_CORE_FT
apixaban 5 mg oral tablet: 1 tab(s) orally 2 times a day  Multiple Vitamins oral tablet: 1 tab(s) orally once a day  Narcan 4 mg/0.1 mL nasal spray: 4 milligram(s) intranasally once as needed for  overdose  oxycodone-acetaminophen 5 mg-325 mg oral tablet: 2 tab(s) orally every 4 hours as needed for  pain  pancrelipase 6000 units-19,000 units-30,000 units oral delayed release capsule: 1 cap(s) orally 3 times a day (with meals)   ARIPiprazole 2 mg oral tablet: 1 tab(s) orally once a day  aspirin 81 mg oral delayed release tablet: 1 tab(s) orally once a day  atorvastatin 40 mg oral tablet: 1 tab(s) orally once a day (at bedtime)  gabapentin 100 mg oral capsule: 1 cap(s) orally once a day (at bedtime)  haloperidol 1 mg oral tablet: 1 tab(s) orally once a day (at bedtime)  losartan 25 mg oral tablet: 1 tab(s) orally once a day  metoprolol succinate 25 mg oral tablet, extended release: 1 tab(s) orally once a day  Multiple Vitamins oral tablet: 1 tab(s) orally once a day  Narcan 4 mg/0.1 mL nasal spray: 4 milligram(s) intranasally once as needed for  overdose  oxycodone-acetaminophen 5 mg-325 mg oral tablet: 2 tab(s) orally every 4 hours as needed for  pain  pancrelipase 6000 units-19,000 units-30,000 units oral delayed release capsule: 1 cap(s) orally 3 times a day (with meals)  vitamin E 200 intl units oral capsule: 1 cap(s) orally once a day   ARIPiprazole 2 mg oral tablet: 1 tab(s) orally once a day  aspirin 81 mg oral delayed release tablet: 1 tab(s) orally once a day  atorvastatin 40 mg oral tablet: 1 tab(s) orally once a day (at bedtime)  gabapentin 100 mg oral capsule: 1 cap(s) orally once a day (at bedtime)  haloperidol 1 mg oral tablet: 1 tab(s) orally once a day (at bedtime)  losartan 25 mg oral tablet: 1 tab(s) orally once a day  metoprolol succinate 25 mg oral tablet, extended release: 1 tab(s) orally once a day  Multiple Vitamins oral tablet: 1 tab(s) orally once a day  Narcan 4 mg/0.1 mL nasal spray: 4 milligram(s) intranasally once as needed for  overdose  oxycodone-acetaminophen 5 mg-325 mg oral tablet: 2 tab(s) orally every 4 hours as needed for  severe pain MDD: 12 tabs  pancrelipase 6000 units-19,000 units-30,000 units oral delayed release capsule: 1 cap(s) orally 3 times a day (with meals)  vitamin E 200 intl units oral capsule: 1 cap(s) orally once a day

## 2023-10-07 NOTE — DISCHARGE NOTE PROVIDER - NSDCQMAMI_CARD_ALL_CORE
No
What Type Of Note Output Would You Prefer (Optional)?: Standard Output
How Severe Are Your Spot(S)?: moderate
Have Your Spot(S) Been Treated In The Past?: has not been treated
Hpi Title: Evaluation of Skin Lesions

## 2023-10-07 NOTE — PROGRESS NOTE ADULT - PROBLEM SELECTOR PLAN 1
Pt with ESRD on HD TIW (MWF) at [facility] [place]. Has been on HD for 3 months. Last HD treatment was on 10/5. Tolerated treatment well. Pt clinically stable. Labs reviewed. Pt refusing HD treatment today. Will continue on MWF schedule. Monitor BP and labs.       If you have any questions, please feel free to contact me.  Yovanny Aparicio  Nephrology Fellow  [id number] / [employer] Teams (Preferred)  (After 4pm or on weekends, please call the on-call Fellow). Pt with ESRD on HD TIW (MWF). Last HD treatment was on 10/5, tolerated treatment well. Pt. refused his scheduled HD treatment today. Pt. says he wants to get his HD treatment on 10/9/23. Pt clinically stable. Labs reviewed. Will reschedule HD treatment to 10/9/23. Monitor BP and labs.      If you have any questions, please feel free to contact me.  Yovanny Aparicio  Nephrology Fellow  [id number] / [employer] Teams (Preferred)  (After 4pm or on weekends, please call the on-call Fellow).

## 2023-10-07 NOTE — PROGRESS NOTE ADULT - PROBLEM SELECTOR PLAN 6
-on apixaban, reportedly for chronic R brachial DVT per [employer] review (doppler from 1/26/23 noted acute DVT at that time, cannot find repeat doppler confirming chronicity in EMR)  -Doppler August 2023 with no DVT in RUE  -switched to lovenox dvt ppx -on apixaban, reportedly for chronic R brachial DVT per [employer] review (doppler from 1/26/23 noted acute DVT at that time, cannot find repeat doppler confirming chronicity in EMR)  -Doppler August 2023 with no DVT in RUE  -switched to heparin dvt ppx

## 2023-10-07 NOTE — DISCHARGE NOTE PROVIDER - NSDCACTIVITY_GEN_ALL_CORE
Received call from patient's wife stating patient has a dentist appointment tuesday. Educated patient's wife that we will send antibiotic script to the pharmacy, educated patient to take one hour before dental cleaning, she verbalized understanding. No heavy lifting/straining

## 2023-10-07 NOTE — PROGRESS NOTE ADULT - ATTENDING COMMENTS
Pt. with ESRD on HD TIW. Last HD was on 10/5/23. Pt. refused his scheduled HD treatment today. Pt. clinically stable. Labs reviewed. Plan to reschedule HD treatment on 10/9/23. Hemoglobin above target range. Monitor BP and labs.
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
## 2023-10-07 NOTE — DISCHARGE NOTE PROVIDER - NSDCFUADDAPPT_GEN_ALL_CORE_FT
APPTS ARE READY TO BE MADE: [X] YES    Best Family or Patient Contact (if needed):  [phone]    Additional Information about above appointments (if needed):    1: Please follow up with your cardiologist (Dr. Juárez) on 10/23/23 when you are discharged from the hospital to make sure you are on the correct medications and doses for your heart failure.  2: Please follow up with your primary care doctor (Dr. Odom) within 1 week of being discharged from the hospital to adjust your pain medication regimen as needed.   3: Please schedule a follow-up appointment with neurology (Dr. Saenz) within 1 week of being discharged from the hospital.     Other comments or requests:    APPTS ARE READY TO BE MADE: [X] YES    Best Family or Patient Contact (if needed):  [phone]    Additional Information about above appointments (if needed):    1: Please follow up with your cardiologist (Dr. Juárez) on 10/23/23 when you are discharged from the hospital to make sure you are on the correct medications and doses for your heart failure.  2: Please follow up with your primary care doctor (Dr. Odom) within 1 week of being discharged from the hospital to adjust your pain medication regimen as needed.   3: Please schedule a follow-up appointment with neurology (Dr. Saenz) within 1 week of being discharged from the hospital.     Other comments or requests:     Patient was scheduled for an appointment on  10/19/23 at 1:30PM at [address] [facility], [place], [place], [place] with Neurologist, Dr Ren Saenz. Patient/Caregiver was advised of appointment details.       APPTS ARE READY TO BE MADE: [X] YES    Best Family or Patient Contact (if needed):  [phone]    Additional Information about above appointments (if needed):    1: Please follow up with your cardiologist (Dr. Juárez) on 10/23/23 when you are discharged from the hospital to make sure you are on the correct medications and doses for your heart failure.  2: Please follow up with your primary care doctor (Dr. Odom) within 1 week of being discharged from the hospital to adjust your pain medication regimen as needed.   3: Please schedule a follow-up appointment with neurology (Dr. Saenz) within 1 week of being discharged from the hospital.     Other comments or requests:     Patient was scheduled for an appointment on  10/19/23 at 1:30PM at [address] [place], [place], [place], [place] with Neurologist, Dr Ren Saenz. Patient/Caregiver was advised of appointment details.      Please follow up with Vascular Neurologist Dr. Josue  [address] [facility] [place] [address]. Call [phone].

## 2023-10-07 NOTE — CONSULT NOTE ADULT - ASSESSMENT
ASSESSMENT       IMPRESSION   Overall (improved, worsened, stable) neurologically.     RECOMMENDATION         Patient to be seen by team and attending. Note finalized upon attending attestation.  ASSESSMENT       IMPRESSION   Overall (improved, worsened, stable) neurologically.     RECOMMENDATION   MRI w/w/o contrast and MRA  alternatively can consider CT head in 24 hrs if patient cannot tolerate  if no contraindications and no concerns for GI bleed/ulcers, can consider stating ASA 81 daily  AMS workup      Patient to be seen by team and attending. Note finalized upon attending attestation.  ASSESSMENT       IMPRESSION   Overall (improved, worsened, stable) neurologically.     RECOMMENDATION   MRI w/w/o contrast and MRA  alternatively can consider CT head in 24 hrs if patient cannot tolerate  AMS workup      Patient to be seen by team and attending. Note finalized upon attending attestation.  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 Neurology consulted for head CT findings showing subacute/chronic infarct in frontal lobe.      IMPRESSION   CT head findings showing subacute/chronic left frontal lobe cortical infarct. Imaging reviewed with fellow and general neurology attending - may have both subcortical and cortical component, may potentially be more chronic in nature. Etiology unclear at this time, pending further workup    RECOMMENDATION   [] MRI w/w/o contrast and MRA  [] alternatively can consider CT head in 24 hrs if patient cannot tolerate  AMS workup  [] Check TSH (1.19), B1, B6, B12 (1777), Vitamin D (25 Hydroxy), and Vitamin E    Case discussed with Dr. Ronan Joe stroke fellow under supervision of Dr. Doshi.  Patient to be seen by team and attending. Note finalized upon attending attestation.  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 Neurology consulted for head CT findings showing subacute/chronic infarct in frontal lobe.      IMPRESSION   CT head findings showing subacute/chronic left frontal lobe cortical infarct. Imaging reviewed with fellow and general neurology attending - may have both subcortical and cortical component, may potentially be more chronic in nature. Etiology unclear at this time, pending further workup    RECOMMENDATION   [] MRI w/w/o contrast and MRA  [] alternatively can consider CT head in 24 hrs if patient cannot tolerate  [] Check TSH (1.19), B1, B6, B12 (1777), Vitamin D (25 Hydroxy), and Vitamin E    Case discussed with Dr. Ronan Joe stroke fellow under supervision of Dr. Doshi.  Patient to be seen by team and attending. Note finalized upon attending attestation.  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 Neurology consulted for head CT findings showing subacute/chronic infarct in frontal lobe.      IMPRESSION   CT head findings showing subacute/chronic left frontal lobe cortical infarct. Imaging reviewed with fellow and general neurology attending - may have both subcortical and cortical component, may potentially be more chronic in nature. Etiology unclear at this time, pending further workup    RECOMMENDATION   [] MRI w/w/o contrast and MRA  [] alternatively can consider CT head in 24 hrs if patient cannot tolerate  [] Check TSH (1.19), B1, B6, B12 (1777), Vitamin D (25 Hydroxy), and Vitamin E  [] Avoiding aspirin due to thrombocytopenia.     Case discussed with Dr. Ronan Joe stroke fellow under supervision of Dr. Doshi.  Patient to be seen by team and attending. Note finalized upon attending attestation.

## 2023-10-07 NOTE — DISCHARGE NOTE PROVIDER - ATTENDING DISCHARGE PHYSICAL EXAMINATION:
Physical Exam:     General: No acute distress    Eyes: PERRL, EOMI     Pulm: No increased WOB. CTAB. No wheezing.     CV: RRR. S1&S2+. No M/R/G appreciated.     Abdomen: +BS. Soft, NTND. No organomegaly.     Extremities: No peripheral edema or cyanosis.     Neuro: A&Ox2    Skin: Warm and dry. No visible rash.

## 2023-10-07 NOTE — PROGRESS NOTE ADULT - PROBLEM SELECTOR PLAN 1
-CT as above  -pain management c/s appreciated: gabapentin 100 bedtime; oxyo 10 q4 prn; lidocaine patch x5d; outpt f/u  -will c/s addiction psychiatry  -c/w pancrealipase  -psych c/s iso pt stating someone else is causing him this pain, magical thinking: see below -CT as above  -pain management c/s appreciated: gabapentin 100 bedtime; oxyo 10 q4 prn; lidocaine patch x5d; outpt f/u  - currently following, will also f/u regarding opioid use d/o  -c/w pancrealipase  -psych c/s iso pt stating someone else is causing him this pain, magical thinking: see below

## 2023-10-07 NOTE — PROGRESS NOTE ADULT - PROBLEM SELECTOR PLAN 2
-psych c/s appreciated: pt with estella psychosis; cannot leave AMA but no current need for 1:1; olanzapine 2.5mg qHS; prn olanzapine with agitation; thiamine 500 IV TID x3d  -CTH with subacute/chronic L frontal lobe cortical infarct (new compared to Aug 2023)  -consider MRI head, pt reports claustrophobia, difficulty tolerating even with premed -psych c/s appreciated: pt with estella psychosis; cannot leave AMA but no current need for 1:1; olanzapine 2.5mg qHS; prn olanzapine with agitation; thiamine 500 IV TID x3d  -CTH with subacute/chronic L frontal lobe cortical infarct (new compared to Aug 2023)  -neuro c/s iso new infarct  -consider MRI head, pt reports claustrophobia, difficulty tolerating even with premed
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
## 2023-10-07 NOTE — CONSULT NOTE ADULT - ATTENDING COMMENTS
62M w/ PMH of HTN, ESRD on HD TIW (MWF) at [facility] [place], chronic pancreatitis, and Hep C who presents to the ED complaining of abdominal pain and weakness. Nephrology was consulted for management of ESRD on HD.  +abdominal pain.  Emaciated, poor appetite  1.  ESRD--concur with orders reviewed with fellow, HD RN  2.  Protein calorie malnutrition--dietary input for protein, calories.  Chronic pancreatitis related malabsorption, other    discussed with med team  Wilfredo Cruz MD  contact me on TEAMS
C/O numbness in both feet like wearing tight socks for a long time.     Exam:  Reflexes: 3 throughout except absent ankle reflexes. Toes mute.     A/P  Mr. Chaudhari is a 62 yo man with infarcts found on CT head.   I agree with work up and management as above.   D/W patient.   Thank you.

## 2023-10-07 NOTE — DISCHARGE NOTE PROVIDER - CARE PROVIDER_API CALL
Ren Saenz  Neurology  [address] [facility], [address]  [place] [address]  Phone: [phone]  Fax: [phone]  Established Patient  Follow Up Time: 1 week

## 2023-10-07 NOTE — CONSULT NOTE ADULT - SUBJECTIVE AND OBJECTIVE BOX
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     Neurology consulted for head CT findings, as follows:  FINDINGS: Multiple chronic bilateral basal ganglia: Infarctions on image   26 of series 2. Small subacute/chronic left frontal lobe cortical infarct   on image 43 of series 2  There is periventricular and subcortical white matter hypodensity without   mass effect, nonspecific, likely representing mild chronic microvascular   ischemic changes. There is no other compelling evidence for an acute   transcortical infarction. There is no evidence of mass, mass effect,   midline shift or extra-axial fluid collection. The lateral ventricles and   cortical sulci are age-appropriate in size and configuration. The orbits,   mastoid air cells and visualized paranasal sinuses are unremarkable. The   calvarium is intact. Consider MRI as clinically warranted.  IMPRESSION: Subacute/chronic left frontal lobe cortical infarct. Consider   MRI for further evaluation.      Review of Systems:  NEUROLOGICAL: +As stated in HPI above  All other review of systems is negative unless indicated above.    Allergies:  Tylenol (Other)      PMHx/PSHx/Family Hx: As above, otherwise see below   ETOH abuse  Pancreatitis  Hepatitis C  Gout  HTN (hypertension)  Chronic kidney disease (CKD)  H/O chronic pancreatitis  Gout  Alcohol abuse  Wound of right foot  ESRD on hemodialysis  DVT (deep venous thrombosis)    Social Hx:  Never smoker; no current use of tobacco, alcohol, or illicit drugs  Lives with ***  Occupation ***  Baseline functional status is ***    Medications:  MEDICATIONS  (STANDING):  gabapentin 100 milliGRAM(s) Oral at bedtime  heparin   Injectable 5000 Unit(s) SubCutaneous every 12 hours  lidocaine   4% Patch 1 Patch Transdermal daily  multivitamin 1 Tablet(s) Oral daily  OLANZapine 2.5 milliGRAM(s) Oral at bedtime  pancrelipase  (CREON  6,000 Lipase Units) 1 Capsule(s) Oral three times a day with meals  polyethylene glycol 3350 17 Gram(s) Oral daily  potassium chloride    Tablet ER 20 milliEquivalent(s) Oral every 2 hours  senna 2 Tablet(s) Oral at bedtime  thiamine IVPB 500 milliGRAM(s) IV Intermittent three times a day    MEDICATIONS  (PRN):  OLANZapine Injectable 2.5 milliGRAM(s) IntraMuscular every 6 hours PRN agitation  oxyCODONE    IR 10 milliGRAM(s) Oral every 4 hours PRN Mod-severe pain      Vitals:  T(C): 36.4 (10-07-23 @ 05:28), Max: 36.9 (10-06-23 @ 20:32)  HR: 84 (10-07-23 @ 05:28) (84 - 92)  BP: 135/76 (10-07-23 @ 05:28) (135/76 - 140/82)  RR: 17 (10-07-23 @ 05:28) (17 - 18)  SpO2: 100% (10-07-23 @ 05:28) (98% - 100%)    Physical Examination: INCOMPLETE  General - non-toxic appearing male/female in no acute distress  Cardiovascular - peripheral pulses palpable, no edema  Respiratory - breathing comfortably with no increased work of breathing    Neurologic Exam:  Mental status - Awake, Alert, Oriented to person, place, and time. Speech fluent, repetition and naming intact. Follows simple and complex commands. Attention/concentration, recent and remote memory (including registration 3/3 and recall 3/3), and fund of knowledge intact    Cranial nerves - PERRLA (4mm -> 3mm b/l), VFF, EOMI, face sensation (V1-V3) intact b/l, facial strength intact without asymmetry b/l, hearing intact b/l, palate with symmetric elevation, trapezius OR sternocleidomastiod 5/5 strength b/l, tongue midline on protrusion with full lateral movement    Motor - Normal bulk and tone throughout. No pronator drift.  Strength testing            Deltoid      Biceps      Triceps     Wrist Extension    Wrist Flexion     Interossei         R            5                 5               5                     5                              5                        5                 5  L             5                 5               5                     5                              5                        5                 5              Hip Flexion    Hip Extension    Knee Flexion    Knee Extension    Dorsiflexion    Plantar Flexion  R              5                         5                       5                           5                            5                          5  L              5                         5                        5                           5                            5                          5    Sensation - Light touch/temperature OR pain/vibration intact throughout  DTR's -             Biceps      Triceps     Brachioradialis      Patellar    Ankle    Toes/plantar response  R             2+             2+                  2+                       2+            2+                 Down  L              2+             2+                 2+                        2+           2+                 Down    Coordination - Finger to Nose intact b/l. No tremors appreciated    Gait and station - Normal casual gait. Romberg (-)    Labs:                        11.4   10.89 )-----------( 49       ( 07 Oct 2023 06:55 )             35.2     10-07    139  |  102  |  21  ----------------------------<  93  3.4<L>   |  21<L>  |  3.27<H>    Ca    7.7<L>      07 Oct 2023 06:55  Phos  3.0     10-07  Mg     1.80     10-07    TPro  4.9<L>  /  Alb  2.6<L>  /  TBili  0.4  /  DBili  x   /  AST  34  /  ALT  95<H>  /  AlkPhos  302<H>  10-07    CAPILLARY BLOOD GLUCOSE        LIVER FUNCTIONS - ( 07 Oct 2023 06:55 )  Alb: 2.6 g/dL / Pro: 4.9 g/dL / ALK PHOS: 302 U/L / ALT: 95 U/L / AST: 34 U/L / GGT: x               CSF:                  Radiology:  CT Head No Cont:  (06 Oct 2023 21:08)     Neurology - Consult Note    Spectra: [phone] ([facility]), [phone] ([facility])    HPI: 63 -year-old male with a history of alcohol abuse, chronic pancreatitis, HCV (s/p tx with SVR), COPD, esophageal stricture (s/p stenting 4/22 with subsequent removal 12/16/2022), DVT (on apixaban; recently d/paulina *had been on for 8 months), ESRD (on HD), history of a lung abscess (Pseudomonas/Morgenella), HTN, chronic systolic CHF, chronic pain (on chronic oxy/acetaminophen), 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     Neurology consulted for head CT findings, as follows:  FINDINGS: Multiple chronic bilateral basal ganglia: Infarctions on image 26 of series 2. Small subacute/chronic left frontal lobe cortical infarct on image 43 of series 2 There is periventricular and subcortical white matter hypodensity without mass effect, nonspecific, likely representing mild chronic microvascular ischemic changes. There is no other compelling evidence for an acute transcortical infarction. There is no evidence of mass, mass effect, midline shift or extra-axial fluid collection. The lateral ventricles and cortical sulci are age-appropriate in size and configuration. The orbits, mastoid air cells and visualized paranasal sinuses are unremarkable. The calvarium is intact. Consider MRI as clinically warranted.IMPRESSION: Subacute/chronic left frontal lobe cortical infarct. Consider MRI for further evaluation.    Review of Systems:  NEUROLOGICAL: +As stated in HPI above  All other review of systems is negative unless indicated above.    Allergies:  Tylenol (Other)    PMHx/PSHx/Family Hx: As above, otherwise see below   ETOH abuse  Pancreatitis  Hepatitis C  Gout  HTN (hypertension)  Chronic kidney disease (CKD)  H/O chronic pancreatitis  Gout  Alcohol abuse  Wound of right foot  ESRD on hemodialysis  DVT (deep venous thrombosis)    Social Hx:  Baseline functional status is wheelchair bound, requires assistance with transfers and ADLs, lives with mother.    Medications:  MEDICATIONS  (STANDING):  gabapentin 100 milliGRAM(s) Oral at bedtime  heparin   Injectable 5000 Unit(s) SubCutaneous every 12 hours  lidocaine   4% Patch 1 Patch Transdermal daily  multivitamin 1 Tablet(s) Oral daily  OLANZapine 2.5 milliGRAM(s) Oral at bedtime  pancrelipase  (CREON  6,000 Lipase Units) 1 Capsule(s) Oral three times a day with meals  polyethylene glycol 3350 17 Gram(s) Oral daily  potassium chloride    Tablet ER 20 milliEquivalent(s) Oral every 2 hours  senna 2 Tablet(s) Oral at bedtime  thiamine IVPB 500 milliGRAM(s) IV Intermittent three times a day    MEDICATIONS  (PRN):  OLANZapine Injectable 2.5 milliGRAM(s) IntraMuscular every 6 hours PRN agitation  oxyCODONE    IR 10 milliGRAM(s) Oral every 4 hours PRN Mod-severe pain      Vitals:  T(C): 36.4 (10-07-23 @ 05:28), Max: 36.9 (10-06-23 @ 20:32)  HR: 84 (10-07-23 @ 05:28) (84 - 92)  BP: 135/76 (10-07-23 @ 05:28) (135/76 - 140/82)  RR: 17 (10-07-23 @ 05:28) (17 - 18)  SpO2: 100% (10-07-23 @ 05:28) (98% - 100%)    Physical Examination:   General - non-toxic appearing male in no acute distress    Neurologic Exam: incomplete  Mental status - Awake, Alert, Oriented to person, place, and time. Speech fluent, repetition and naming intact. Follows simple and complex commands. Attention/concentration, recent and remote memory (including registration 3/3 and recall 3/3), and fund of knowledge intact    Cranial nerves - PERRLA (4mm -> 3mm b/l), VFF, EOMI, face sensation (V1-V3) intact b/l, facial strength intact without asymmetry b/l, hearing intact b/l, palate with symmetric elevation, trapezius OR sternocleidomastiod 5/5 strength b/l, tongue midline on protrusion with full lateral movement    Motor - Normal bulk and tone throughout. No pronator drift.  Strength testing            Deltoid      Biceps      Triceps     Wrist Extension    Wrist Flexion     Interossei         R            5                 5               5                     5                              5                        5                 5  L             5                 5               5                     5                              5                        5                 5              Hip Flexion    Hip Extension    Knee Flexion    Knee Extension    Dorsiflexion    Plantar Flexion  R              5                         5                       5                           5                            5                          5  L              5                         5                        5                           5                            5                          5    Sensation - Light touch/temperature OR pain/vibration intact throughout  DTR's -             Biceps      Triceps     Brachioradialis      Patellar    Ankle    Toes/plantar response  R             2+             2+                  2+                       2+            2+                 Down  L              2+             2+                 2+                        2+           2+                 Down    Coordination - Finger to Nose intact b/l. No tremors appreciated    Gait and station - Normal casual gait. Romberg (-)    Labs:                        11.4   10.89 )-----------( 49       ( 07 Oct 2023 06:55 )             35.2     10-07    139  |  102  |  21  ----------------------------<  93  3.4<L>   |  21<L>  |  3.27<H>    Ca    7.7<L>      07 Oct 2023 06:55  Phos  3.0     10-07  Mg     1.80     10-07    TPro  4.9<L>  /  Alb  2.6<L>  /  TBili  0.4  /  DBili  x   /  AST  34  /  ALT  95<H>  /  AlkPhos  302<H>  10-07    CAPILLARY BLOOD GLUCOSE        LIVER FUNCTIONS - ( 07 Oct 2023 06:55 )  Alb: 2.6 g/dL / Pro: 4.9 g/dL / ALK PHOS: 302 U/L / ALT: 95 U/L / AST: 34 U/L / GGT: x               CSF:                  Radiology:  CT Head No Cont:  (06 Oct 2023 21:08)     Neurology - Consult Note    Spectra: [phone] ([facility]), [phone] ([facility])    HPI: 63 -year-old male with a history of alcohol abuse, chronic pancreatitis, HCV (s/p tx with SVR), COPD, esophageal stricture (s/p stenting 4/22 with subsequent removal 12/16/2022), DVT (on apixaban; recently d/paulina *had been on for 8 months), ESRD (on HD), history of a lung abscess (Pseudomonas/Morgenella), HTN, chronic systolic CHF, chronic pain (on chronic oxy/acetaminophen), 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     Neurology consulted for head CT findings, as follows:  FINDINGS: Multiple chronic bilateral basal ganglia: Infarctions on image 26 of series 2. Small subacute/chronic left frontal lobe cortical infarct on image 43 of series 2 There is periventricular and subcortical white matter hypodensity without mass effect, nonspecific, likely representing mild chronic microvascular ischemic changes. There is no other compelling evidence for an acute transcortical infarction. There is no evidence of mass, mass effect, midline shift or extra-axial fluid collection. The lateral ventricles and cortical sulci are age-appropriate in size and configuration. The orbits, mastoid air cells and visualized paranasal sinuses are unremarkable. The calvarium is intact. Consider MRI as clinically warranted. IMPRESSION: Subacute/chronic left frontal lobe cortical infarct. Consider MRI for further evaluation.    Of note patient also has been seen by behavioral health team. 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    Patient seen at bedside. Explained reason for consult for patient responds 'this is how I end up getting surgeries.' States he has been on eliquis consistently but is unsure of what dose he takes at home.    Review of Systems:  NEUROLOGICAL: +As stated in HPI above  All other review of systems is negative unless indicated above.    Allergies:  Tylenol (Other)    PMHx/PSHx/Family Hx: As above, otherwise see below   ETOH abuse  Pancreatitis  Hepatitis C  Gout  HTN (hypertension)  Chronic kidney disease (CKD)  H/O chronic pancreatitis  Gout  Alcohol abuse  Wound of right foot  ESRD on hemodialysis  DVT (deep venous thrombosis)    Social Hx:  Baseline functional status is wheelchair bound, requires assistance with transfers and ADLs, lives with mother.    Medications:  MEDICATIONS  (STANDING):  gabapentin 100 milliGRAM(s) Oral at bedtime  heparin   Injectable 5000 Unit(s) SubCutaneous every 12 hours  lidocaine   4% Patch 1 Patch Transdermal daily  multivitamin 1 Tablet(s) Oral daily  OLANZapine 2.5 milliGRAM(s) Oral at bedtime  pancrelipase  (CREON  6,000 Lipase Units) 1 Capsule(s) Oral three times a day with meals  polyethylene glycol 3350 17 Gram(s) Oral daily  potassium chloride    Tablet ER 20 milliEquivalent(s) Oral every 2 hours  senna 2 Tablet(s) Oral at bedtime  thiamine IVPB 500 milliGRAM(s) IV Intermittent three times a day    MEDICATIONS  (PRN):  OLANZapine Injectable 2.5 milliGRAM(s) IntraMuscular every 6 hours PRN agitation  oxyCODONE    IR 10 milliGRAM(s) Oral every 4 hours PRN Mod-severe pain      Vitals:  T(C): 36.4 (10-07-23 @ 05:28), Max: 36.9 (10-06-23 @ 20:32)  HR: 84 (10-07-23 @ 05:28) (84 - 92)  BP: 135/76 (10-07-23 @ 05:28) (135/76 - 140/82)  RR: 17 (10-07-23 @ 05:28) (17 - 18)  SpO2: 100% (10-07-23 @ 05:28) (98% - 100%)    Physical Examination:   General - non-toxic appearing male in no acute distress    Neurologic Exam:   Mental status - Awake, Alert, Oriented to person, place, and time. Speech fluent, repetition and naming intact. Follows simple and complex commands.   Cranial nerves - PERRLA (4mm -> 3mm b/l), VFF, EOMI, face sensation (V1-V3) intact b/l, facial strength intact without asymmetry b/l, hearing intact b/l, palate with symmetric elevation,sternocleidomastiod 5/5 strength b/l, tongue midline on protrusion with full lateral movement  Motor - Normal bulk and tone throughout. No pronator drift. Able to  hands 5/5 bilat, able to maintain bilateral UE 10 seconds antigravity, LE bilaterally 5 seconds antigravity.  Sensation - Light touch intact throughout  DTR's - deferred for focal exam  Coordination - Finger to Nose intact b/l. No tremors appreciated  Gait and station - Deferred.    Labs:                        11.4   10.89 )-----------( 49       ( 07 Oct 2023 06:55 )             35.2     10-07    139  |  102  |  21  ----------------------------<  93  3.4<L>   |  21<L>  |  3.27<H>    Ca    7.7<L>      07 Oct 2023 06:55  Phos  3.0     10-07  Mg     1.80     10-07  TPro  4.9<L>  /  Alb  2.6<L>  /  TBili  0.4  /  DBili  x   /  AST  34  /  ALT  95<H>  /  AlkPhos  302<H>  10-07  LIVER FUNCTIONS - ( 07 Oct 2023 06:55 )  Alb: 2.6 g/dL / Pro: 4.9 g/dL / ALK PHOS: 302 U/L / ALT: 95 U/L / AST: 34 U/L / GGT: x           Radiology:  CT Head No Cont:  (06 Oct 2023 21:08)  : Multiple chronic bilateral basal ganglia: Infarctions on image 26 of series 2. Small subacute/chronic left frontal lobe cortical infarct on image 43 of series 2  There is periventricular and subcortical white matter hypodensity without mass effect, nonspecific, likely representing mild chronic microvascular ischemic changes. There is no other compelling evidence for an acute transcortical infarction. There is no evidence of mass, mass effect, midline shift or extra-axial fluid collection. The lateral ventricles and cortical sulci are age-appropriate in size and configuration. The orbits, mastoid air cells and visualized paranasal sinuses are unremarkable. The calvarium is intact. Consider MRI as clinically warranted.    IMPRESSION: Subacute/chronic left frontal lobe cortical infarct. Consider MRI for further evaluation.       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     Neurology consulted for head CT findings, as follows:  FINDINGS: Multiple chronic bilateral basal ganglia: Infarctions on image 26 of series 2. Small subacute/chronic left frontal lobe cortical infarct on image 43 of series 2 There is periventricular and subcortical white matter hypodensity without mass effect, nonspecific, likely representing mild chronic microvascular ischemic changes. There is no other compelling evidence for an acute transcortical infarction. There is no evidence of mass, mass effect, midline shift or extra-axial fluid collection. The lateral ventricles and cortical sulci are age-appropriate in size and configuration. The orbits, mastoid air cells and visualized paranasal sinuses are unremarkable. The calvarium is intact. Consider MRI as clinically warranted. IMPRESSION: Subacute/chronic left frontal lobe cortical infarct. Consider MRI for further evaluation.    Of note patient also has been seen by behavioral health team. 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    Patient seen at bedside. Explained reason for consult for patient responds 'this is how I end up getting surgeries.' States he has been on eliquis consistently but is unsure of what dose he takes at home.    Review of Systems:  NEUROLOGICAL: +As stated in HPI above  All other review of systems is negative unless indicated above.    Allergies:  Tylenol (Other)    PMHx/PSHx/Family Hx: As above, otherwise see below   ETOH abuse  Pancreatitis  Hepatitis C  Gout  HTN (hypertension)  Chronic kidney disease (CKD)  H/O chronic pancreatitis  Gout  Alcohol abuse  Wound of right foot  ESRD on hemodialysis  DVT (deep venous thrombosis)    Social Hx:  Baseline functional status is wheelchair bound, requires assistance with transfers and ADLs, lives with mother.    Medications:  MEDICATIONS  (STANDING):  gabapentin 100 milliGRAM(s) Oral at bedtime  heparin   Injectable 5000 Unit(s) SubCutaneous every 12 hours  lidocaine   4% Patch 1 Patch Transdermal daily  multivitamin 1 Tablet(s) Oral daily  OLANZapine 2.5 milliGRAM(s) Oral at bedtime  pancrelipase  (CREON  6,000 Lipase Units) 1 Capsule(s) Oral three times a day with meals  polyethylene glycol 3350 17 Gram(s) Oral daily  potassium chloride    Tablet ER 20 milliEquivalent(s) Oral every 2 hours  senna 2 Tablet(s) Oral at bedtime  thiamine IVPB 500 milliGRAM(s) IV Intermittent three times a day    MEDICATIONS  (PRN):  OLANZapine Injectable 2.5 milliGRAM(s) IntraMuscular every 6 hours PRN agitation  oxyCODONE    IR 10 milliGRAM(s) Oral every 4 hours PRN Mod-severe pain      Vitals:  T(C): 36.4 (10-07-23 @ 05:28), Max: 36.9 (10-06-23 @ 20:32)  HR: 84 (10-07-23 @ 05:28) (84 - 92)  BP: 135/76 (10-07-23 @ 05:28) (135/76 - 140/82)  RR: 17 (10-07-23 @ 05:28) (17 - 18)  SpO2: 100% (10-07-23 @ 05:28) (98% - 100%)    Physical Examination:   General - non-toxic appearing male in no acute distress    Neurologic Exam:   Mental status - Awake, Alert, Oriented to person, place, and time. Speech fluent, repetition and naming intact. Follows simple and complex commands.   Cranial nerves - PERRLA (4mm -> 3mm b/l), VFF, EOMI, face sensation (V1-V3) intact b/l, facial strength intact without asymmetry b/l, hearing intact b/l, palate with symmetric elevation,sternocleidomastiod 5/5 strength b/l, tongue midline on protrusion with full lateral movement  Motor - Normal bulk and tone throughout. No pronator drift. Able to  hands 5/5 bilat, able to maintain bilateral UE 10 seconds antigravity, LE bilaterally 5 seconds antigravity.  Sensation - Light touch intact throughout  Coordination - Finger to Nose intact b/l. No tremors appreciated  Gait and station - Deferred.    Labs:                        11.4   10.89 )-----------( 49       ( 07 Oct 2023 06:55 )             35.2     10-07    139  |  102  |  21  ----------------------------<  93  3.4<L>   |  21<L>  |  3.27<H>    Ca    7.7<L>      07 Oct 2023 06:55  Phos  3.0     10-07  Mg     1.80     10-07  TPro  4.9<L>  /  Alb  2.6<L>  /  TBili  0.4  /  DBili  x   /  AST  34  /  ALT  95<H>  /  AlkPhos  302<H>  10-07  LIVER FUNCTIONS - ( 07 Oct 2023 06:55 )  Alb: 2.6 g/dL / Pro: 4.9 g/dL / ALK PHOS: 302 U/L / ALT: 95 U/L / AST: 34 U/L / GGT: x           Radiology:  CT Head No Cont:  (06 Oct 2023 21:08)  : Multiple chronic bilateral basal ganglia: Infarctions on image 26 of series 2. Small subacute/chronic left frontal lobe cortical infarct on image 43 of series 2  There is periventricular and subcortical white matter hypodensity without mass effect, nonspecific, likely representing mild chronic microvascular ischemic changes. There is no other compelling evidence for an acute transcortical infarction. There is no evidence of mass, mass effect, midline shift or extra-axial fluid collection. The lateral ventricles and cortical sulci are age-appropriate in size and configuration. The orbits, mastoid air cells and visualized paranasal sinuses are unremarkable. The calvarium is intact. Consider MRI as clinically warranted.    IMPRESSION: Subacute/chronic left frontal lobe cortical infarct. Consider MRI for further evaluation.

## 2023-10-07 NOTE — PROGRESS NOTE ADULT - PROBLEM SELECTOR PLAN 3
-appreciate renal recs  -states he produces minimal urine  -reports urinary complaints (hematuria, frequency, dysuria iso not taking opioids)  -UA with small LE, few bacteria, low c/f UTI

## 2023-10-07 NOTE — DISCHARGE NOTE PROVIDER - NSDCCPCAREPLAN_GEN_ALL_CORE_FT
PRINCIPAL DISCHARGE DIAGNOSIS  Diagnosis: Abdominal pain  Assessment and Plan of Treatment: CT was done of your abdomen showing the chronic pancreatitis. You were seen by pain management who adjusted your pain regimen to include gabapentin and lidocaine patch in addition to oxycodone, which improved your pain. Please follow up with pain management clinic for further management of your pain.      SECONDARY DISCHARGE DIAGNOSES  Diagnosis: Psychosis  Assessment and Plan of Treatment: You were evaluated by behavioral health, who started you on a medication called olanzapine. They also evaluated you regarding opioid use disorder ____. We obtained a CT scan of your head which showed an infarct in your brain, new from a couple of months ago. Neurology saw you recommending ___.     PRINCIPAL DISCHARGE DIAGNOSIS  Diagnosis: Abdominal pain  Assessment and Plan of Treatment: CT was done of your abdomen showing the chronic pancreatitis. You were seen by pain management who adjusted your pain regimen to include gabapentin (100 mg at bedtime) and lidocaine patch in addition to oxycodone (10 mg every 4 hours as needed), which improved your pain. Please follow up with your primary care doctor (Dr. Odom) and pain management clinic for further management of your pain and to make sure that you are on the correct dose of medications. You are also being prescribed a narcan kit for any possible opioid overdose. Please seek immediate medical attention if you experience:  - sudden worsening of abdominal pain  - belly pain w/ pain that goes to the back  - chest pain  - shortness of breath  - Fever, chills, nausea/vomiting, diarrhea      SECONDARY DISCHARGE DIAGNOSES  Diagnosis: Psychosis  Assessment and Plan of Treatment: You were evaluated by behavioral health, who started you on a medication called olanzapine at night. They also evaluated you regarding opioid use disorder. The chronic pain management team saw you and you were continued on a pain regimen of 10 mg oxycodone every 4 hours as needed while you were hospitalized in addition to gabapentin 100mg at bedtime. We obtained a CT scan of your head which showed an infarct in your brain, new from a couple of months ago. Neurology saw you and recommended that you start aspirin 81mg every day and atorvastatin 40 mg every night. Please take this medications as prescribed when you are discharged from the hospital. Please follow up with neurology within 1 week of being discharged from the hospital.   Please seek immediate medical assistance if:  - You notice and increase in hearing and seeing things that other people don't  - You have any thoughts of harming yourself or anyone else around you      Diagnosis: Congestive heart failure  Assessment and Plan of Treatment: Heart failure is a condition in which the heart does not pump well. This causes the heart to lag behind in its job of moving blood throughout the body. As a result, fluid backs up in the body, and the organs in the body do not get as much blood as they need. This can lead to symptoms, such as swelling, trouble breathing, and feeling tired. You were started on medications (losartan 50 mg once/day, metoprolol succinate 25 mg once/day, and aspirin). You had an ultrasound of your heart done which showed that your heart is working at 20-25% of what it should be. Please follow-up with your cardiologist (Dr. Juárez) within one week of being discharged from the hospital to make sure that you are on the correct medications and doses for you. Please seek immediate medical attention if:  You have any trouble breathing, including waking up at night or needing more pillows than usual to sleep.  - You have wheezing or chest tightness when resting.  - You have a very fast or irregular heartbeat.  - You cough more than normal, or cough up frothy or pink saliva.  - You feel more tired than normal.  - You have diarrhea, or cannot drink water.  - Your weight goes up by 2 or more pounds (1 kilogram) in 1 day, or 4 or more pounds (2 kilograms) in 1 week.  - You have more swelling than usual, especially in your feet and ankles or in your belly.  - You have any new or worrying symptoms.

## 2023-10-07 NOTE — PROGRESS NOTE ADULT - SUBJECTIVE AND OBJECTIVE BOX
[facility] Division of Kidney Diseases & Hypertension  FOLLOW UP NOTE  [phone]--------------------------------------------------------------------------------    Chief Complaint: ESRD/HD management    24 hour events/subjective: Pt seen and evaluated at bedside eating breakfast. Still complaining of generalized pain. Currently denies fevers/chills, HA, palpitations, SOB, leg swelling. Last HD on 10/5 and tolerated treatment well. Refusing HD today.    PAST HISTORY  --------------------------------------------------------------------------------  No significant changes to PMH, PSH, FHx, SHx, unless otherwise noted    ALLERGIES & MEDICATIONS  --------------------------------------------------------------------------------  Allergies  Tylenol (Other)    Intolerances    Standing Inpatient Medications  gabapentin 100 milliGRAM(s) Oral at bedtime  heparin   Injectable 5000 Unit(s) SubCutaneous every 12 hours  lidocaine   4% Patch 1 Patch Transdermal daily  multivitamin 1 Tablet(s) Oral daily  OLANZapine 2.5 milliGRAM(s) Oral at bedtime  pancrelipase  (CREON  6,000 Lipase Units) 1 Capsule(s) Oral three times a day with meals  polyethylene glycol 3350 17 Gram(s) Oral daily  potassium chloride    Tablet ER 20 milliEquivalent(s) Oral every 2 hours  senna 2 Tablet(s) Oral at bedtime  thiamine IVPB 500 milliGRAM(s) IV Intermittent three times a day    PRN Inpatient Medications  OLANZapine Injectable 2.5 milliGRAM(s) IntraMuscular every 6 hours PRN  oxyCODONE    IR 10 milliGRAM(s) Oral every 4 hours PRN    REVIEW OF SYSTEMS  --------------------------------------------------------------------------------  Gen: No fevers/chills  Head/Eyes/Ears/Mouth: No headache  Respiratory: No dyspnea, cough  CV: No palpitations  GI: No constipation/diarrhea  MSK: generalized body pains, no edema  Neuro: +weakness    All other systems were reviewed and are negative, except as noted.    VITALS/PHYSICAL EXAM  --------------------------------------------------------------------------------  T(C): 36.4 (10-07-23 @ 05:28), Max: 36.9 (10-06-23 @ 20:32)  HR: 84 (10-07-23 @ 05:28) (84 - 92)  BP: 135/76 (10-07-23 @ 05:28) (135/76 - 140/82)  RR: 17 (10-07-23 @ 05:28) (17 - 18)  SpO2: 100% (10-07-23 @ 05:28) (98% - 100%)  Wt(kg): --  Height (cm): 182.9 (10-05-23 @ 16:51)  Weight (kg): 40 (10-05-23 @ 16:51)  BMI (kg/m2): 12 (10-05-23 @ 16:51)  BSA (m2): 1.5 (10-05-23 @ 16:51)    10-06-23 @ 07:01  -  10-07-23 @ 07:00  --------------------------------------------------------  IN: 240 mL / OUT: 300 mL / NET: -60 mL    Physical Exam:  Gen: NAD, cachectic appearing  HEENT: MMM  Pulm: CTA B/L  CV: S1S2  Abd: Soft, +BS   Ext: No B/L LE edema  Neuro: Awake  Skin: Warm and dry  Vascular access: LUE AVF, skin intact, thrill felt, dressing on fistula site    LABS/STUDIES  --------------------------------------------------------------------------------              11.4   10.89 >-----------<  49       [10-07-23 @ 06:55]              35.2     139  |  102  |  21  ----------------------------<  93      [10-07-23 @ 06:55]  3.4   |  21  |  3.27        Ca     7.7     [10-07-23 @ 06:55]      Mg     1.80     [10-07-23 @ 06:55]      Phos  3.0     [10-07-23 @ 06:55]    TPro  4.9  /  Alb  2.6  /  TBili  0.4  /  DBili  x   /  AST  34  /  ALT  95  /  AlkPhos  302  [10-07-23 @ 06:55]          [10-06-23 @ 06:40]    Creatinine Trend:  SCr 3.27 [10-07 @ 06:55]  SCr 2.27 [10-06 @ 06:40]  SCr 2.69 [10-05 @ 06:00]    Iron 45, TIBC 84, %sat 54      [10-06-23 @ 06:40]  Ferritin 1081      [10-06-23 @ 06:40] [facility] Division of Kidney Diseases & Hypertension  FOLLOW UP NOTE  [phone]--------------------------------------------------------------------------------    Chief Complaint: ESRD/HD management    24 hour events/subjective: Pt seen and evaluated at bedside today morning. Pt. feels better, eating breakfast. Pt. underwent HD on 10/5/23. Pt. refused his scheduled HD treatment today. Pt. says he wants to get his HD treatment on 10/9/23. Pt. denies fever, CP, SOB, HA or LE swelling.     PAST HISTORY  --------------------------------------------------------------------------------  No significant changes to PMH, PSH, FHx, SHx, unless otherwise noted    ALLERGIES & MEDICATIONS  --------------------------------------------------------------------------------  Allergies  Tylenol (Other)    Intolerances    Standing Inpatient Medications  gabapentin 100 milliGRAM(s) Oral at bedtime  heparin   Injectable 5000 Unit(s) SubCutaneous every 12 hours  lidocaine   4% Patch 1 Patch Transdermal daily  multivitamin 1 Tablet(s) Oral daily  OLANZapine 2.5 milliGRAM(s) Oral at bedtime  pancrelipase  (CREON  6,000 Lipase Units) 1 Capsule(s) Oral three times a day with meals  polyethylene glycol 3350 17 Gram(s) Oral daily  potassium chloride    Tablet ER 20 milliEquivalent(s) Oral every 2 hours  senna 2 Tablet(s) Oral at bedtime  thiamine IVPB 500 milliGRAM(s) IV Intermittent three times a day    PRN Inpatient Medications  OLANZapine Injectable 2.5 milliGRAM(s) IntraMuscular every 6 hours PRN  oxyCODONE    IR 10 milliGRAM(s) Oral every 4 hours PRN    REVIEW OF SYSTEMS  --------------------------------------------------------------------------------  Gen: No fevers/chills, +gen weakness  Head/Eyes/Ears/Mouth: No headache  Respiratory: No dyspnea, cough  CV: No CP  GI: No constipation/diarrhea  MSK: +generalized body pains, no edema  Neuro: No dizziness    All other systems were reviewed and are negative, except as noted.    VITALS/PHYSICAL EXAM  --------------------------------------------------------------------------------  T(C): 36.4 (10-07-23 @ 05:28), Max: 36.9 (10-06-23 @ 20:32)  HR: 84 (10-07-23 @ 05:28) (84 - 92)  BP: 135/76 (10-07-23 @ 05:28) (135/76 - 140/82)  RR: 17 (10-07-23 @ 05:28) (17 - 18)  SpO2: 100% (10-07-23 @ 05:28) (98% - 100%)  Wt(kg): --  Height (cm): 182.9 (10-05-23 @ 16:51)  Weight (kg): 40 (10-05-23 @ 16:51)  BMI (kg/m2): 12 (10-05-23 @ 16:51)  BSA (m2): 1.5 (10-05-23 @ 16:51)    10-06-23 @ 07:01  -  10-07-23 @ 07:00  --------------------------------------------------------  IN: 240 mL / OUT: 300 mL / NET: -60 mL    Physical Exam:  Gen: resting, appears cachectic  HEENT: No JVD  Pulm: CTA B/L  CV: S1S2+  Abd: Soft, +BS   Ext: No B/L LE edema  Neuro: Awake  Skin: Warm and dry  Vascular access: LUE AVF site: dressing seen, thrill felt    LABS/STUDIES  --------------------------------------------------------------------------------              11.4   10.89 >-----------<  49       [10-07-23 @ 06:55]              35.2     139  |  102  |  21  ----------------------------<  93      [10-07-23 @ 06:55]  3.4   |  21  |  3.27        Ca     7.7     [10-07-23 @ 06:55]      Mg     1.80     [10-07-23 @ 06:55]      Phos  3.0     [10-07-23 @ 06:55]    TPro  4.9  /  Alb  2.6  /  TBili  0.4  /  DBili  x   /  AST  34  /  ALT  95  /  AlkPhos  302  [10-07-23 @ 06:55]          [10-06-23 @ 06:40]    Creatinine Trend:  SCr 3.27 [10-07 @ 06:55]  SCr 2.27 [10-06 @ 06:40]  SCr 2.69 [10-05 @ 06:00]    Iron 45, TIBC 84, %sat 54      [10-06-23 @ 06:40]  Ferritin 1081      [10-06-23 @ 06:40]

## 2023-10-07 NOTE — PROGRESS NOTE ADULT - PROBLEM SELECTOR PLAN 7
-BNP >55709, troponin 131  -clinically euvolemic and no CP, low concern for ACS; elevation possibly iso poor renal clearance  -repeat EKG not concerning for acute MI  -monitor I/Os, daily weights

## 2023-10-08 NOTE — PROGRESS NOTE ADULT - PROBLEM SELECTOR PLAN 2
-psych c/s appreciated: pt with estella psychosis; cannot leave AMA but no current need for 1:1; olanzapine 2.5mg qHS; prn olanzapine with agitation; thiamine 500 IV TID x3d  -CTH with subacute/chronic L frontal lobe cortical infarct (new compared to Aug 2023)  -neuro c/s iso new infarct  -consider MRI head, pt reports claustrophobia, difficulty tolerating even with premed -psych c/s appreciated: pt with estella psychosis; cannot leave AMA but no current need for 1:1; olanzapine 2.5mg qHS; prn olanzapine with agitation; thiamine 500 IV TID x3d  -CTH with subacute/chronic L frontal lobe cortical infarct (new compared to Aug 2023)  -neuro c/s iso new infarct  -F/u MRI head, pt reports claustrophobia, difficulty tolerating even with premed

## 2023-10-08 NOTE — PROGRESS NOTE ADULT - ATTENDING COMMENTS
64 yo M with hx EtOH abuse, chronic pancreatitis, HCV, COPD, esophageal stricture, prior DVT, ESRD on HD, HTN, chronic systolic HF, chronic pain recent hospitalization 8/2023 for AMS/hypoglycemia thought to be possibly opioid overdose requiring intubation, now coming in with pain in the setting of not receiving pain medications as PCP not refilling pain medications given concern for risks and recent overdose. Currently on PRN oxycodone for severe pain, monitoring pain and mental status closely. Pain management consulted, appreciate recommendations. Avoiding IV opioids at this time. Continue HD per renal.  CT head noted , noted to have Subacute/chronic left frontal lobe cortical infarct. Pt not on asa, statin, will request transfer to Tele ,  Neurology eval appreciated , recommend  MRI w/w/o contrast and MRA,  alternatively can consider CT head in 24 hrs if patient cannot tolerate, Avoiding aspirin due to thrombocytopenia per neuro recs   H/h improved compared to last admission, will CTM  thrombocytopenia: chronic, will monitor   plan of care d/w pt  Rest of the management as above

## 2023-10-08 NOTE — PROGRESS NOTE ADULT - SUBJECTIVE AND OBJECTIVE BOX
Internal Medicine   Mayuri Victor | PGY-2    OVERNIGHT EVENTS: No acute overnight events.    SUBJECTIVE:       MEDICATIONS  (STANDING):  gabapentin 100 milliGRAM(s) Oral at bedtime  heparin   Injectable 5000 Unit(s) SubCutaneous every 12 hours  lidocaine   4% Patch 1 Patch Transdermal daily  multivitamin 1 Tablet(s) Oral daily  OLANZapine 2.5 milliGRAM(s) Oral at bedtime  pancrelipase  (CREON  6,000 Lipase Units) 1 Capsule(s) Oral three times a day with meals  polyethylene glycol 3350 17 Gram(s) Oral daily  senna 2 Tablet(s) Oral at bedtime  thiamine IVPB 500 milliGRAM(s) IV Intermittent three times a day    MEDICATIONS  (PRN):  OLANZapine Injectable 2.5 milliGRAM(s) IntraMuscular every 6 hours PRN agitation  oxyCODONE    IR 10 milliGRAM(s) Oral every 4 hours PRN Mod-severe pain        T(F): 97.5 (10-08-23 @ 06:17), Max: 98.1 (10-07-23 @ 21:44)  HR: 93 (10-08-23 @ 06:17) (80 - 103)  BP: 136/75 (10-08-23 @ 06:17) (132/66 - 136/83)  BP(mean): --  RR: 19 (10-08-23 @ 06:17) (16 - 19)  SpO2: 98% (10-08-23 @ 06:17) (98% - 100%)    PHYSICAL EXAM:     GENERAL: NAD, lying in bed comfortably  HEAD:  Atraumatic, Normocephalic  EYES: EOMI, PERRLA, conjunctiva and sclera clear, no nystagmus noted  ENT: Moist mucous membranes,   NECK: Supple, No JVD, trachea midline  CHEST/LUNG: Clear to auscultation bilaterally; No rales, rhonchi, wheezing, or rubs. Unlabored respirations  HEART: Regular rate and rhythm; No murmurs, rubs, or gallops, normal S1/S2  ABDOMEN: normal bowel sounds; Soft, nontender, nondistended, no organomegaly   EXTREMITIES:  2+ Peripheral Pulses, brisk capillary refill. No clubbing, cyanosis, or edema  MSK: No gross deformities noted   Neurological:  A&Ox3, no focal deficits   SKIN: No rashes or lesions  PSYCH: Normal mood, affect     TELEMETRY:    LABS:                        11.4   10.89 )-----------( 49       ( 07 Oct 2023 06:55 )             35.2     10-07    139  |  102  |  21  ----------------------------<  93  3.4<L>   |  21<L>  |  3.27<H>    Ca    7.7<L>      07 Oct 2023 06:55  Phos  3.0     10-07  Mg     1.80     10-07    TPro  4.9<L>  /  Alb  2.6<L>  /  TBili  0.4  /  DBili  x   /  AST  34  /  ALT  95<H>  /  AlkPhos  302<H>  10-07            Creatinine Trend: 3.27<--, 2.27<--, 2.69<--  I&O's Summary    BNP    RADIOLOGY & ADDITIONAL STUDIES:             Internal Medicine   Mayuri Kayleigh | PGY-2    OVERNIGHT EVENTS: No acute overnight events.    SUBJECTIVE: Patient was seen and examined at bedside this morning. Pt reports continued abdominal pain. Denies any nausea/vomiting/diarrhea, headache, shortness of breath, or chest pain/palpitations. Patient responding appropriately to questions and able to make needs known. Vital signs/imaging/telemetry events reviewed. Provider asked pt if pt has a family member/friend who pt would like for primary team to contact, and pt requested that primary team does not contact anyone.      MEDICATIONS  (STANDING):  gabapentin 100 milliGRAM(s) Oral at bedtime  heparin   Injectable 5000 Unit(s) SubCutaneous every 12 hours  lidocaine   4% Patch 1 Patch Transdermal daily  multivitamin 1 Tablet(s) Oral daily  OLANZapine 2.5 milliGRAM(s) Oral at bedtime  pancrelipase  (CREON  6,000 Lipase Units) 1 Capsule(s) Oral three times a day with meals  polyethylene glycol 3350 17 Gram(s) Oral daily  senna 2 Tablet(s) Oral at bedtime  thiamine IVPB 500 milliGRAM(s) IV Intermittent three times a day    MEDICATIONS  (PRN):  OLANZapine Injectable 2.5 milliGRAM(s) IntraMuscular every 6 hours PRN agitation  oxyCODONE    IR 10 milliGRAM(s) Oral every 4 hours PRN Mod-severe pain        T(F): 97.5 (10-08-23 @ 06:17), Max: 98.1 (10-07-23 @ 21:44)  HR: 93 (10-08-23 @ 06:17) (80 - 103)  BP: 136/75 (10-08-23 @ 06:17) (132/66 - 136/83)  BP(mean): --  RR: 19 (10-08-23 @ 06:17) (16 - 19)  SpO2: 98% (10-08-23 @ 06:17) (98% - 100%)    PHYSICAL EXAM:     GENERAL: +cachectic, mildly distressed due to abdominal pain, lying in bed   HEAD:  Atraumatic, Normocephalic  EYES: EOMI, PERRLA, conjunctiva and sclera clear, no nystagmus noted  ENT: Moist mucous membranes,   NECK: Supple, No JVD, trachea midline  CHEST/LUNG: Clear to auscultation bilaterally; No rales, rhonchi, wheezing, or rubs. Unlabored respirations  HEART: Regular rate and rhythm; No murmurs, rubs, or gallops, normal S1/S2  ABDOMEN: normal bowel sounds; Soft, nontender, nondistended, no organomegaly   EXTREMITIES:  2+ Peripheral Pulses, brisk capillary refill. No clubbing, cyanosis, or edema  MSK: No gross deformities noted   Neurological:  A&Ox3, no focal deficits   SKIN: No rashes or lesions  PSYCH: Normal mood, affect     TELEMETRY:    LABS:                        11.4   10.89 )-----------( 49       ( 07 Oct 2023 06:55 )             35.2     10-07    139  |  102  |  21  ----------------------------<  93  3.4<L>   |  21<L>  |  3.27<H>    Ca    7.7<L>      07 Oct 2023 06:55  Phos  3.0     10-07  Mg     1.80     10-07    TPro  4.9<L>  /  Alb  2.6<L>  /  TBili  0.4  /  DBili  x   /  AST  34  /  ALT  95<H>  /  AlkPhos  302<H>  10-07            Creatinine Trend: 3.27<--, 2.27<--, 2.69<--  I&O's Summary    BNP    RADIOLOGY & ADDITIONAL STUDIES:

## 2023-10-08 NOTE — PROGRESS NOTE ADULT - PROBLEM SELECTOR PLAN 4
Impression: wAMD OD then OS-Severe OD, early OS, Fail Avstn->EyLea IMPROVED. Progn. poor OD Plan: Hx: [[Fibrosis Hmg rslv'd OD Scar, NEW CNV OS- FAIL Avastin (SRF) FAIL extend '14. Chng EyLea -- PRN RARE PO FA -- Oct '19 last d/t poor veins]] TODAY EyLea OU inj (proc note). RTC 8w dil, injx, OCT eval h/o Eylea OU. Future ND3? #macrocytic anemia  B12 1777, folate 7.5  TSH 1.19  ferritin 1081  , hapto   trend H/H  s/p 1units pRBC 9/02/2023  #thrombocytopenia   evaluated by heme on prior admission, thought to be multifactorial   chronic/stable  monitor/trend

## 2023-10-08 NOTE — PROGRESS NOTE ADULT - PROBLEM SELECTOR PLAN 3
-appreciate renal recs  -states he produces minimal urine  -reports urinary complaints (hematuria, frequency, dysuria iso not taking opioids)  -UA with small LE, few bacteria, low c/f UTI -C/w HD MWF  -appreciate renal recs  -states he produces minimal urine  -reports urinary complaints (hematuria, frequency, dysuria iso not taking opioids)  -UA with small LE, few bacteria, low c/f UTI

## 2023-10-08 NOTE — PROGRESS NOTE ADULT - PROBLEM SELECTOR PLAN 7
-BNP >91181, troponin 131  -clinically euvolemic and no CP, low concern for ACS; elevation possibly iso poor renal clearance  -repeat EKG not concerning for acute MI  -monitor I/Os, daily weights

## 2023-10-08 NOTE — PROGRESS NOTE ADULT - PROBLEM SELECTOR PLAN 1
-CT as above  -pain management c/s appreciated: gabapentin 100 bedtime; oxyo 10 q4 prn; lidocaine patch x5d; outpt f/u  - currently following, will also f/u regarding opioid use d/o  -c/w pancrealipase  -psych c/s iso pt stating someone else is causing him this pain, magical thinking: see below

## 2023-10-08 NOTE — PROGRESS NOTE ADULT - PROBLEM SELECTOR PLAN 6
-on apixaban, reportedly for chronic R brachial DVT per [employer] review (doppler from 1/26/23 noted acute DVT at that time, cannot find repeat doppler confirming chronicity in EMR)  -Doppler August 2023 with no DVT in RUE  -switched to heparin dvt ppx

## 2023-10-09 NOTE — PROGRESS NOTE ADULT - SUBJECTIVE AND OBJECTIVE BOX
[facility] Division of Kidney Diseases & Hypertension  FOLLOW UP NOTE  [phone]--------------------------------------------------------------------------------    Chief Complaint: ESRD/HD management    24 hour events/subjective: Pt seen and evaluated at bedside today morning after HD. Pt. feels well with improvement in pain and tolerated HD treatment well. Pt. denies fever, CP, SOB, HA or LE swelling.     PAST HISTORY  --------------------------------------------------------------------------------  No significant changes to PMH, PSH, FHx, SHx, unless otherwise noted    ALLERGIES & MEDICATIONS  --------------------------------------------------------------------------------  Allergies  Tylenol (Other)    Intolerances    Standing Inpatient Medications  chlorhexidine 2% Cloths 1 Application(s) Topical daily  gabapentin 100 milliGRAM(s) Oral at bedtime  heparin   Injectable 5000 Unit(s) SubCutaneous every 12 hours  lidocaine   4% Patch 1 Patch Transdermal daily  multivitamin 1 Tablet(s) Oral daily  pancrelipase  (CREON  6,000 Lipase Units) 1 Capsule(s) Oral three times a day with meals  polyethylene glycol 3350 17 Gram(s) Oral daily  senna 2 Tablet(s) Oral at bedtime    PRN Inpatient Medications  LORazepam     Tablet 1 milliGRAM(s) Oral every 8 hours PRN  oxyCODONE    IR 10 milliGRAM(s) Oral every 4 hours PRN    REVIEW OF SYSTEMS  --------------------------------------------------------------------------------  Gen: No fevers/chills, +gen weakness  Head/Eyes/Ears/Mouth: No headache, +difficulty with speech  Respiratory: No dyspnea, cough  CV: No CP  GI: No constipation/diarrhea  MSK: +generalized body pains that have improved, no edema  Neuro: No dizziness    All other systems were reviewed and are negative, except as noted.    VITALS/PHYSICAL EXAM  --------------------------------------------------------------------------------  T(C): 36.4 (10-09-23 @ 12:27), Max: 36.8 (10-09-23 @ 06:40)  HR: 79 (10-09-23 @ 12:27) (71 - 90)  BP: 139/74 (10-09-23 @ 12:27) (118/71 - 152/88)  RR: 17 (10-09-23 @ 12:27) (16 - 18)  SpO2: 100% (10-09-23 @ 12:27) (100% - 100%)  Wt(kg): --    10-08-23 @ 07:01  -  10-09-23 @ 07:00  --------------------------------------------------------  IN: 200 mL / OUT: 150 mL / NET: 50 mL    10-09-23 @ 07:01  -  10-09-23 @ 17:59  --------------------------------------------------------  IN: 500 mL / OUT: 2543 mL / NET: -2043 mL    Physical Exam:  Gen: NAD, appears cachectic  HEENT: No JVD  Pulm: CTA B/L  CV: S1S2+  Abd: Soft, +BS   Ext: No B/L LE edema  Neuro: Awake  Skin: Warm and dry  Vascular access: LUE AVF site: dressing seen, thrill felt    LABS/STUDIES  --------------------------------------------------------------------------------              9.5    5.63  >-----------<  52       [10-09-23 @ 06:00]              29.3     142  |  108  |  44  ----------------------------<  88      [10-09-23 @ 06:00]  3.7   |  19  |  4.74        Ca     7.4     [10-09-23 @ 06:00]      Mg     2.10     [10-09-23 @ 06:00]      Phos  3.2     [10-09-23 @ 06:00]    TPro  4.3  /  Alb  2.3  /  TBili  0.3  /  DBili  x   /  AST  30  /  ALT  62  /  AlkPhos  247  [10-09-23 @ 06:00]    Creatinine Trend:  SCr 4.74 [10-09 @ 06:00]  SCr 4.15 [10-08 @ 07:07]  SCr 3.27 [10-07 @ 06:55]  SCr 2.27 [10-06 @ 06:40]  SCr 2.69 [10-05 @ 06:00]    Iron 45, TIBC 84, %sat 54      [10-06-23 @ 06:40]  Ferritin 1081      [10-06-23 @ 06:40]    HBsAb 5.2      [10-05-23 @ 20:40]  HBsAg Nonreact      [10-05-23 @ 20:40]  HCV 5.37, Reactive      [10-05-23 @ 20:40]

## 2023-10-09 NOTE — PROGRESS NOTE ADULT - PROBLEM SELECTOR PLAN 7
-BNP >29644, troponin 131  -clinically euvolemic and no CP, low concern for ACS; elevation possibly iso poor renal clearance  -repeat EKG not concerning for acute MI  -monitor I/Os, daily weights

## 2023-10-09 NOTE — PROGRESS NOTE ADULT - PROBLEM SELECTOR PLAN 1
Pt with ESRD on HD TIW (MWF). Pt tolerated treatment well today. Plan for next HD treatment on 10/11/23. Pt clinically stable. Labs reviewed. Monitor BP and labs.      If you have any questions, please feel free to contact me.  Yovanny Aparicio  Nephrology Fellow  [id number] / [employer] Teams (Preferred)  (After 4pm or on weekends, please call the on-call Fellow).

## 2023-10-09 NOTE — PROGRESS NOTE ADULT - PROBLEM SELECTOR PLAN 3
-C/w HD MWF  -appreciate renal recs  -states he produces minimal urine  -reports urinary complaints (hematuria, frequency, dysuria iso not taking opioids)  -UA with small LE, few bacteria, low c/f UTI

## 2023-10-09 NOTE — BH CONSULTATION LIAISON PROGRESS NOTE - NSBHFUPINTERVALHXFT_PSY_A_CORE
Chart reviewed. Vital signs stable. Patient seen and examined at bedside. Patient states that his mood is "all right." Patient was calm and eating a cookie during the evaluation. Patient constantly spits up during feeding and continues to endorse that it feels as though "someone else is swallowing for me." Patient continues to endorse experiencing "thought transfer" from individuals on TV because "he is God." Patient notes that he has transcended the word "God" and is alternatively referred to as "Maxi" which is the result of removing the letters "L, U, and R" from his first name, Eitan. Patient states that most people of influence are "doubles" with secondary identities (e.g., Adrian Davis is Jagjit). Patient states that the examiner is a "double" of Seinfeld. Patient denies SIIP or HIIP.

## 2023-10-09 NOTE — PROGRESS NOTE ADULT - ATTENDING COMMENTS
62M w/ PMH of HTN, ESRD on HD TIW (MWF) at [facility], chronic pancreatitis, and Hep C who presents to the ED complaining of abdominal pain and weakness. Nephrology was consulted for management of ESRD on HD.  +abdominal pain.  Emaciated, poor appetite.  Appetite improving, loves GLUCERNA.  HD today  1.  ESRD--concur with orders reviewed with fellow, HD RN  2.  Protein calorie malnutrition--dietary input for protein, calories.  Chronic pancreatitis related malabsorption, other.  On Creon.  WOULD NOT CONFINE TO NEPRO supplement.  ANY THAT HE'LL DRINK IN QUANTITY.  We'll dialyze if excessive protein and bind PO4.    3.  HYpokalemia--4 K dialysate, increased nutrition    discussed with med team  Wilfredo Cruz MD  contact me on TEAMS

## 2023-10-09 NOTE — BH CONSULTATION LIAISON PROGRESS NOTE - NSBHASSESSMENTFT_PSY_ALL_CORE
61-yo AAM, single, reportedly domiciled with his mother, patient denying any prior psychiatric history (was treated in correction 25 years ago with haldol for unknown symptoms/diagnosis and never followed up after release from correction) and history of alcohol abuse (reports quitting "long time ago"), PMH of HCV, Hx of esophageal stricture (S/p stenting in 5/22), Emphysema, chronic pancreatitis, multiple prior medical hospitalizations for confusion, pancreatitis, etc., psych c/s for psychosis.    Patient exhibiting estella psychosis - has delusions, paranoia, referential thoughts, thought insertion, grandose/hyperreligiousness. Given CKD and HD, concern for lewy body dementia and avoidance of EPS, would begin Olanzapine though must monitor QTc and LFTs which are already somewhat elevated. Needs further psych assessment. Cognitively intact despite worse psychosis than prior hospitalizations. No current signs of etoh withdrawal despite 0 etoh, no current role for CIWA unless such features abruptly begin.     10/9: Patient was calm but continues to exhibit estella psychosis. These features were similar in content to the examination on 10/6, particularly in regard to delusions, referential thoughts, thought insertion, and grandiose ideology. Patient found to have subacute/chronic L frontal lobe cortical infarct on CT head that was new compared to scan in August of 2023.    Recs:  - no current need for 1:1  - cannot leave AMA needs further psych eval, patient psychotic  - Discontinue standing Olanzapine 2.5mg qHS in the setting of QTc prolongation and continued thrombocytopenia - can help with appetite stimulation, hiccups, sleep as well  - Recommend lorazepam 1mg q8h PRN PO/IM/IV for severe agitation - Will not prolong QTc interval  - Discontinue PRN severe agitation Olanzapine 2.5mg q6h PRN PO/IM  - s/p empiric thiamine repletion 500mg IV TID for 3d (completed 10/9)  - CT head with subacute/chronic L frontal lobe cortical infarct, f/u MRI head per neurology - patient's mental status much worse than prior hospitalizations  - Psych will follow 61-yo AAM, single, reportedly domiciled with his mother, patient denying any prior psychiatric history (was treated in senior living 25 years ago with haldol for unknown symptoms/diagnosis and never followed up after release from senior living) and history of alcohol abuse (reports quitting "long time ago"), PMH of HCV, Hx of esophageal stricture (S/p stenting in 5/22), Emphysema, chronic pancreatitis, multiple prior medical hospitalizations for confusion, pancreatitis, etc., psych c/s for psychosis.    Patient exhibiting estella psychosis - has delusions, paranoia, referential thoughts, thought insertion, grandose/hyperreligiousness. Given CKD and HD, concern for lewy body dementia and avoidance of EPS, would begin Olanzapine though must monitor QTc and LFTs which are already somewhat elevated. Needs further psych assessment. Cognitively intact despite worse psychosis than prior hospitalizations. No current signs of etoh withdrawal despite 0 etoh, no current role for CIWA unless such features abruptly begin.     10/9: Patient was calm but continues to exhibit estella psychosis. These features were similar in content to the examination on 10/6, particularly in regard to delusions, referential thoughts, thought insertion, and grandiose ideology. Patient found to have subacute/chronic L frontal lobe cortical infarct on CT head that was new compared to scan in August of 2023. QTc prolonged    Recs:  - no current need for 1:1  - cannot leave AMA needs further psych eval, patient psychotic  - Discontinue standing Olanzapine 2.5mg qHS in the setting of QTc prolongation and continued thrombocytopenia  - Please repeat EKG for QTc and inform psych if QTc still prolonged as this effect mgmt greatly  - Recommend lorazepam 1mg q8h PRN PO/IM/IV for severe agitation - Will not prolong QTc interval  - Discontinue PRN severe agitation Olanzapine 2.5mg q6h PRN PO/IM  - s/p empiric thiamine repletion 500mg IV TID for 3d (completed 10/9)  - CT head with subacute/chronic L frontal lobe cortical infarct, f/u MRI head per neurology - patient's mental status much worse than prior hospitalizations  - Psych will follow

## 2023-10-09 NOTE — PROGRESS NOTE ADULT - ATTENDING COMMENTS
63M w/ ESRD on HD, chronic systolic HF (2022 - mild global systolic dysfunction), alcohol use, h/o HCV (most recent VL negative), chronic pancreatitis (taking chronic opioids), COPD, h/o esophageal stricture (stented 5/22), h/o DVT (no longer on a/c), recent hospitalization for possible opioid overdose requiring intubation presenting with acute on chronic abdominal pain in setting of running out of pain medications at home. Found to have new psychosis – psychiatry consulted and as part of this workup received CTH, which showed L frontal lobe cortical subacute/chronic infarct.    - Psychosis - unclear etiology (no known prior psych diagnoses); appreciate psychiatry involvement, continue olanzapine, receiving thiamine 500mg IV TID x3 days (10/6-10/9)  - Subacute/chronic L frontal lobe cortical infarct - appreciate neurology involvement; pending MRI brain vs repeat CTH (if MRI cannot happen today); neuro rec no ASA given thrombocytopenia; monitor on telemetry   - Thrombocytopenia - on and off since 2020; hematology evaluated in 9/2023 and felt multifactorial  - Chronic pancreatitis - pain service consulted (now signed off) - pain currently well controlled on current regimen  - ESRD on HD - renal consulted for HD  - Dispo - awaiting PT re-evaluation    Discussed with interdisciplinary team at [facility] 63M w/ ESRD on HD, chronic systolic HF (2022 - mild global systolic dysfunction), alcohol use, h/o HCV (most recent VL negative), chronic pancreatitis (taking chronic opioids), COPD, h/o esophageal stricture (stented 5/22), h/o DVT (no longer on a/c), recent hospitalization for possible opioid overdose requiring intubation presenting with acute on chronic abdominal pain in setting of running out of pain medications at home. Found to have new psychosis – psychiatry consulted and as part of this workup received CTH, which showed L frontal lobe cortical subacute/chronic infarct.    - Psychosis - unclear etiology (no known prior psych diagnoses); appreciate psychiatry involvement, continue olanzapine, receiving thiamine 500mg IV TID x3 days (10/6-10/9)  - Subacute/chronic L frontal lobe cortical infarct - appreciate neurology involvement; pending MRI brain vs repeat CTH (if MRI cannot happen today); neuro rec no ASA given thrombocytopenia; monitor on telemetry   - Thrombocytopenia - on and off since 2020; hematology evaluated in 9/2023 and felt multifactorial  - Chronic pancreatitis - pain service consulted (now signed off) - pain currently well controlled on current regimen  - ESRD on HD - renal consulted for HD  - Dispo - awaiting PT re-evaluation    Discussed with interdisciplinary team at [facility]. Itraconazole Counseling:  I discussed with the patient the risks of itraconazole including but not limited to liver damage, nausea/vomiting, neuropathy, and severe allergy.  The patient understands that this medication is best absorbed when taken with acidic beverages such as non-diet cola or ginger ale.  The patient understands that monitoring is required including baseline LFTs and repeat LFTs at intervals.  The patient understands that they are to contact us or the primary physician if concerning signs are noted.

## 2023-10-09 NOTE — PROGRESS NOTE ADULT - SUBJECTIVE AND OBJECTIVE BOX
IMELDA ROBERTSON  63y  Male      Patient is a 63y old  Male who presents with a chief complaint of chronic pancreatitis pain (08 Oct 2023 07:03)      INTERVAL HPI/OVERNIGHT EVENTS:      REVIEW OF SYSTEMS:  CONSTITUTIONAL: No fever, weight loss, or fatigue  ENMT: No sinus or throat pain  NECK: No pain or stiffness  RESPIRATORY: No cough, wheezing, chills or hemoptysis; No shortness of breath  CARDIOVASCULAR: No chest pain, palpitations, dizziness, or leg swelling  GASTROINTESTINAL: No abdominal or epigastric pain. No nausea, vomiting, or hematemesis; No diarrhea or constipation. No melena or hematochezia.  GENITOURINARY: No dysuria, frequency, hematuria, or incontinence  NEUROLOGICAL: No headaches,, loss of strength, numbness, or tremors  SKIN: No itching, burning, rashes, or lesions   MUSCULOSKELETAL: No joint pain or swelling; No muscle, back, or extremity pain    FAMILY HISTORY:  FH: HTN (hypertension)      T(C): 36.4 (10-09-23 @ 05:04), Max: 36.6 (10-08-23 @ 15:00)  HR: 90 (10-09-23 @ 05:04) (87 - 90)  BP: 137/81 (10-09-23 @ 05:04) (132/70 - 152/88)  RR: 17 (10-09-23 @ 05:04) (17 - 18)  SpO2: 100% (10-09-23 @ 05:04) (98% - 100%)  Wt(kg): --Vital Signs Last 24 Hrs  T(C): 36.4 (09 Oct 2023 05:04), Max: 36.6 (08 Oct 2023 15:00)  T(F): 97.5 (09 Oct 2023 05:04), Max: 97.9 (08 Oct 2023 15:00)  HR: 90 (09 Oct 2023 05:04) (87 - 90)  BP: 137/81 (09 Oct 2023 05:04) (132/70 - 152/88)  BP(mean): --  RR: 17 (09 Oct 2023 05:04) (17 - 18)  SpO2: 100% (09 Oct 2023 05:04) (98% - 100%)    Parameters below as of 09 Oct 2023 05:04  Patient On (Oxygen Delivery Method): room air      Tylenol (Other)      PHYSICAL EXAM:  GENERAL: NAD, well-groomed, well-developed  HEAD:  Atraumatic, Normocephalic  EYES: EOMI, PERRLA, conjunctiva and sclera clear  ENMT: Moist mucous membranes, Good dentition, No lesions  NECK: Supple, No JVD, Normal thyroid  NERVOUS SYSTEM:  Alert & Oriented X3, Good concentration; Motor Strength 5/5 B/L upper and lower extremities;   CHEST/LUNG: Clear to auscultation  bilaterally; No rales, rhonchi, wheezing, or rubs  HEART: Regular rate and rhythm; No murmurs, rubs, or gallops  ABDOMEN: Soft, Nontender, Nondistended; Bowel sounds present  EXTREMITIES:  2+ Peripheral Pulses, No clubbing, cyanosis, or edema  LYMPH: No lymphadenopathy noted  SKIN: No rashes or lesions    Consultant(s) Notes Reviewed:  [x ] YES  [ ] NO  Care Discussed with Consultants/Other Providers [ x] YES  [ ] NO    LABS:      RADIOLOGY & ADDITIONAL TESTS:    Imaging Personally Reviewed:  [ ] YES  [ ] NO  gabapentin 100 milliGRAM(s) Oral at bedtime  heparin   Injectable 5000 Unit(s) SubCutaneous every 12 hours  lidocaine   4% Patch 1 Patch Transdermal daily  multivitamin 1 Tablet(s) Oral daily  OLANZapine 2.5 milliGRAM(s) Oral at bedtime  OLANZapine Injectable 2.5 milliGRAM(s) IntraMuscular every 6 hours PRN  oxyCODONE    IR 10 milliGRAM(s) Oral every 4 hours PRN  pancrelipase  (CREON  6,000 Lipase Units) 1 Capsule(s) Oral three times a day with meals  polyethylene glycol 3350 17 Gram(s) Oral daily  senna 2 Tablet(s) Oral at bedtime  thiamine IVPB 500 milliGRAM(s) IV Intermittent three times a day      HEALTH ISSUES - PROBLEM Dx:  ESRD on hemodialysis    Bicytopenia    DVT (deep venous thrombosis)    Chronic systolic congestive heart failure    Need for prophylactic measure    Chronic pancreatitis    Cachexia    Psychosis             IMELDA ROBERTSON  63y  Male      Patient is a 63y old  Male who presents with a chief complaint of chronic pancreatitis pain (08 Oct 2023 07:03)      INTERVAL HPI/OVERNIGHT EVENTS:  - NAEO. VS stable, on RA, SpO2 100%.  - S/p HD this AM  - Reports continued pain "in the pancreas and in the back"; states that pain medication wears off before the next dose is due, however, no acute worsening of quality/intensity/frequency of pain.  - Denies lightheadedness, fevers, chills, headache, chest pain, dyspnea, nausea, emesis.  - AOx3 and appears to respond many questions appropriately. Occasionally makes comments that seem illogical, became upset that "nobody believes what I'm saying" and asks "who do you think she [anchor on TV] is talking to?"      REVIEW OF SYSTEMS:  CONSTITUTIONAL: No fever, weight loss, or fatigue  ENMT: No sinus or throat pain  NECK: No pain or stiffness  RESPIRATORY: No cough, wheezing, chills or hemoptysis; No shortness of breath  CARDIOVASCULAR: No chest pain, palpitations, dizziness, or leg swelling  GASTROINTESTINAL: No abdominal or epigastric pain. No nausea, vomiting, or hematemesis; No diarrhea or constipation. No melena or hematochezia.  GENITOURINARY: No dysuria, frequency, hematuria, or incontinence  NEUROLOGICAL: No headaches,, loss of strength, numbness, or tremors  SKIN: No itching, burning, rashes, or lesions   MUSCULOSKELETAL: No joint pain or swelling; No muscle, back, or extremity pain    FAMILY HISTORY:  FH: HTN (hypertension)      T(C): 36.4 (10-09-23 @ 05:04), Max: 36.6 (10-08-23 @ 15:00)  HR: 90 (10-09-23 @ 05:04) (87 - 90)  BP: 137/81 (10-09-23 @ 05:04) (132/70 - 152/88)  RR: 17 (10-09-23 @ 05:04) (17 - 18)  SpO2: 100% (10-09-23 @ 05:04) (98% - 100%)  Wt(kg): --Vital Signs Last 24 Hrs  T(C): 36.4 (09 Oct 2023 05:04), Max: 36.6 (08 Oct 2023 15:00)  T(F): 97.5 (09 Oct 2023 05:04), Max: 97.9 (08 Oct 2023 15:00)  HR: 90 (09 Oct 2023 05:04) (87 - 90)  BP: 137/81 (09 Oct 2023 05:04) (132/70 - 152/88)  BP(mean): --  RR: 17 (09 Oct 2023 05:04) (17 - 18)  SpO2: 100% (09 Oct 2023 05:04) (98% - 100%)    Parameters below as of 09 Oct 2023 05:04  Patient On (Oxygen Delivery Method): room air      Tylenol (Other)      PHYSICAL EXAM:  GENERAL: cachectic, chronically ill-appearing, conversable  HEAD:  Atraumatic, Normocephalic  EYES: EOMI, PERRLA, conjunctiva and sclera clear, teary eyes  ENMT: Moist mucous membranes, Good dentition, No lesions  NECK: Supple, No JVD, Normal thyroid  CHEST/LUNG: Clear to auscultation bilaterally; No rales, rhonchi, wheezing, or rubs  HEART: Regular rate and rhythm; No murmurs, rubs, or gallops  ABDOMEN: Soft, Nontender, Nondistended; Bowel sounds present  EXTREMITIES:  2+ Peripheral Pulses, No clubbing, cyanosis, or edema  LYMPH: No lymphadenopathy noted  SKIN: No rashes or lesions  NERVOUS SYSTEM:  Alert & Oriented X3, Good concentration; Motor Strength 5/5 B/L upper and lower extremities;   PSYCH: Occasionally makes illogical remarks that seem to suggest psychosis/delusions    Consultant(s) Notes Reviewed:  [x ] YES  [ ] NO  Care Discussed with Consultants/Other Providers [ x] YES  [ ] NO    LABS:                        9.5    5.63  )-----------( 52       ( 09 Oct 2023 06:00 )             29.3     10-09    142  |  108<H>  |  44<H>  ----------------------------<  88  3.7   |  19<L>  |  4.74<H>    Ca    7.4<L>      09 Oct 2023 06:00  Phos  3.2     10-09  Mg     2.10     10-09    TPro  4.3<L>  /  Alb  2.3<L>  /  TBili  0.3  /  DBili  x   /  AST  30  /  ALT  62<H>  /  AlkPhos  247<H>  10-09        CAPILLARY BLOOD GLUCOSE                  Urinalysis Basic - ( 09 Oct 2023 06:00 )    Color: x / Appearance: x / SG: x / pH: x  Gluc: 88 mg/dL / Ketone: x  / Bili: x / Urobili: x   Blood: x / Protein: x / Nitrite: x   Leuk Esterase: x / RBC: x / WBC x   Sq Epi: x / Non Sq Epi: x / Bacteria: x            I & O Summary:    10-08-23 @ 07:01  -  10-09-23 @ 07:00  --------------------------------------------------------  IN: 200 mL / OUT: 150 mL / NET: 50 mL    10-09-23 @ 07:01  -  10-09-23 @ 16:53  --------------------------------------------------------  IN: 500 mL / OUT: 2543 mL / NET: -2043 mL        Microbiology:            RADIOLOGY & ADDITIONAL TESTS:    Imaging Personally Reviewed:  [ ] YES  [ ] NO  gabapentin 100 milliGRAM(s) Oral at bedtime  heparin   Injectable 5000 Unit(s) SubCutaneous every 12 hours  lidocaine   4% Patch 1 Patch Transdermal daily  multivitamin 1 Tablet(s) Oral daily  OLANZapine 2.5 milliGRAM(s) Oral at bedtime  OLANZapine Injectable 2.5 milliGRAM(s) IntraMuscular every 6 hours PRN  oxyCODONE    IR 10 milliGRAM(s) Oral every 4 hours PRN  pancrelipase  (CREON  6,000 Lipase Units) 1 Capsule(s) Oral three times a day with meals  polyethylene glycol 3350 17 Gram(s) Oral daily  senna 2 Tablet(s) Oral at bedtime  thiamine IVPB 500 milliGRAM(s) IV Intermittent three times a day      HEALTH ISSUES - PROBLEM Dx:  ESRD on hemodialysis    Bicytopenia    DVT (deep venous thrombosis)    Chronic systolic congestive heart failure    Need for prophylactic measure    Chronic pancreatitis    Cachexia    Psychosis

## 2023-10-09 NOTE — PROGRESS NOTE ADULT - PROBLEM SELECTOR PLAN 1
-CT as above  -pain management c/s appreciated: gabapentin 100 bedtime; oxyo 10 q4 prn; lidocaine patch x5d; outpt f/u  - currently following, will also f/u regarding opioid use d/o  -c/w pancrealipase  -psych c/s iso pt stating someone else is causing him this pain, magical thinking: see below -CT A/P 10/5: Sequela of chronic pancreatitis with mild common bile duct dilatation unchanged. The prior peripancreatic foci of air have resolved.  -pain management c/s appreciated: gabapentin 100 bedtime; oxy 10 mg PO q4 prn; lidocaine patch x5d; outpt f/u  -Chronic pain recommends considering gradually weaning off opioids as tolerated (oxy dose reduction by 10% weekly, and continue f/u with outpatient Pain Medicine) and maximizing non-opioid analgesia as appropriate, as pain stable and patient does not have definite organic cause that opioids are actively treating  -Could consider Suboxone, patient previously declined  -c/w pancrealipase  -psych c/s iso pt stating someone else is causing him this pain, magical thinking: see below

## 2023-10-09 NOTE — PROGRESS NOTE ADULT - ASSESSMENT
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 Found to have psychosis/delusions significantly worse than prior hospitalizations despite mostly intact cognition, consulted Psychiatry for optimization of psychiatric meds.

## 2023-10-09 NOTE — PROGRESS NOTE ADULT - PROBLEM SELECTOR PLAN 2
-psych c/s appreciated: pt with estella psychosis; cannot leave AMA but no current need for 1:1; olanzapine 2.5mg qHS; prn olanzapine with agitation; thiamine 500 IV TID x3d  -CTH with subacute/chronic L frontal lobe cortical infarct (new compared to Aug 2023)  -neuro c/s iso new infarct  -F/u MRI head, pt reports claustrophobia, difficulty tolerating even with premed -psych c/s appreciated: pt with estella psychosis; cannot leave AMA but no current need for 1:1  -olanzapine discontinued i/s/o prolonged QTc and current thrombocytopenia, and no significant clinical improvement  -repeat ECG ordered to evaluate for QTc prolongation  -Start lorazepam 1mg q8h PRN PO/IM/IV for severe agitation per Psych  -CTH with subacute/chronic L frontal lobe cortical infarct (new compared to Aug 2023)  -neuro c/s iso new infarct  -F/u MRI/CT Head, pt reports claustrophobia, difficulty tolerating even with premed

## 2023-10-10 NOTE — PROGRESS NOTE ADULT - ATTENDING COMMENTS
63M w/ ESRD on HD, chronic systolic HF (2022 - "mild global systolic dysfunction"), alcohol use, h/o HCV (most recent VL negative), chronic pancreatitis (taking chronic opioids), COPD, h/o esophageal stricture (stented 5/22), h/o DVT (no longer on a/c), recent hospitalization for possible opioid overdose requiring intubation presenting with acute on chronic abdominal pain in setting of running out of pain medications at home. Found to have new psychosis – psychiatry consulted and as part of this workup received CTH, which showed L frontal lobe cortical subacute/chronic infarct.    - Psychosis - unclear etiology (no known prior psych diagnoses); appreciate psychiatry involvement; start haldol per psych given improved Qtc and least likely to effect platelets, s/p thiamine 500mg IV TID x3 days (10/6-10/9)  - Subacute/chronic L frontal lobe cortical infarct - appreciate neurology involvement; pending repeat CTH (given wait for MRI); neuro rec no ASA given thrombocytopenia; monitor on telemetry   - Systolic heart failure - based on TTE in 2022 without assessment of EF and prior ECHO w/ normal EF - repeat ECHO today - if truly HFrEF would benefit from ischemic evaluation and starting GDMT (inpt vs outpt)  - Thrombocytopenia - on and off since 2020; hematology evaluated in 9/2023 and felt multifactorial  - Chronic pancreatitis - pain service consulted (now signed off) - pain currently well controlled on current regimen - recommend consideration of slow taper vs transition to suboxone as outpatient  - ESRD on HD - renal consulted for HD

## 2023-10-10 NOTE — PROGRESS NOTE ADULT - PROBLEM SELECTOR PLAN 1
-CT A/P 10/5: Sequela of chronic pancreatitis with mild common bile duct dilatation unchanged. The prior peripancreatic foci of air have resolved.  -pain management c/s appreciated: gabapentin 100 bedtime; oxy 10 mg PO q4 prn; lidocaine patch x5d; outpt f/u  -Chronic pain recommends considering gradually weaning off opioids as tolerated (oxy dose reduction by 10% weekly, and continue f/u with outpatient Pain Medicine) and maximizing non-opioid analgesia as appropriate, as pain stable and patient does not have definite organic cause that opioids are actively treating  -Could consider Suboxone, patient previously declined  -c/w pancrealipase  -psych c/s iso pt stating someone else is causing him this pain, magical thinking: see below - Psych c/s appreciated: pt with estella psychosis; cannot leave AMA but no current need for 1:1  - Olanzapine discontinued i/s/o prolonged QTc and current thrombocytopenia, and no significant clinical improvement  - Current Psych recs:  -- standing haloperidol 1mg PO qHS *continue to monitor QTc interval and HOLD for QTc >500 ms*  -- lorazepam 1mg q8h PRN PO/IM/IV for severe agitation per Psych  - CTH with subacute/chronic L frontal lobe cortical infarct (new compared to Aug 2023)  - Patient to remain on tele while admitted per Neuro  - CTH and CTA H/N ordered per Neuro  - Daily ECG for QTc evaluation (QTc on 10/9: 462 ms)  - Evaluating candidacy for Psych admission

## 2023-10-10 NOTE — PROGRESS NOTE ADULT - ASSESSMENT
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 Found to have psychosis/delusions significantly worse than prior hospitalizations despite mostly intact cognition, consulted Psychiatry for optimization of psychiatric meds.

## 2023-10-10 NOTE — PROGRESS NOTE ADULT - PROBLEM SELECTOR PLAN 3
-C/w HD MWF  -appreciate renal recs  -states he produces minimal urine  -reports urinary complaints (hematuria, frequency, dysuria iso not taking opioids)  -UA with small LE, few bacteria, low c/f UTI #macrocytic anemia, likely i/s/o chronic illness/nutritional deficiency  trend H/H -- stable    #thrombocytopenia   evaluated by heme on prior admission, thought to be multifactorial   chronic/stable  monitor/trend

## 2023-10-10 NOTE — PROGRESS NOTE ADULT - PROBLEM SELECTOR PLAN 5
-nutrition consult appreciated  -regular diet -nutrition consult appreciated  -increased risk of aspiration with continued coughing with swallowing food and spitting out with copious oral secretions --> NPO, resume regular diet pending successful bedside swallow test

## 2023-10-10 NOTE — PROGRESS NOTE ADULT - PROBLEM SELECTOR PLAN 6
-on apixaban, reportedly for chronic R brachial DVT per [employer] review (doppler from 1/26/23 noted acute DVT at that time, cannot find repeat doppler confirming chronicity in EMR)  -Doppler August 2023 with no DVT in RUE  -switched to heparin dvt ppx -on apixaban, reportedly for chronic R brachial DVT per [employer] review (doppler from 1/26/23 noted acute DVT at that time, cannot find repeat doppler confirming chronicity in EMR)  -Doppler August 2023 with no DVT in RUE  -switched to SQ heparin

## 2023-10-10 NOTE — PROGRESS NOTE ADULT - PROBLEM SELECTOR PLAN 4
#macrocytic anemia  B12 1777, folate 7.5  TSH 1.19  ferritin 1081  , hapto   trend H/H  s/p 1units pRBC 9/02/2023  #thrombocytopenia   evaluated by heme on prior admission, thought to be multifactorial   chronic/stable  monitor/trend -CT A/P 10/5: Sequela of chronic pancreatitis with mild common bile duct dilatation unchanged. The prior peripancreatic foci of air have resolved.  -pain management c/s appreciated: gabapentin 100 bedtime; oxy 10 mg PO q4 prn; lidocaine patch x5d; outpt f/u  -Chronic pain recommends considering gradually weaning off opioids as tolerated (oxy dose reduction by 10% weekly, and continue f/u with outpatient Pain Medicine) and maximizing non-opioid analgesia as appropriate, as pain stable and patient does not have definite organic cause that opioids are actively treating -- appreciate recs  -Could consider Suboxone, patient previously declined  -c/w pancrelipase PUMPING/breastfeeding exclusively

## 2023-10-10 NOTE — BH CONSULTATION LIAISON PROGRESS NOTE - NSBHFUPINTERVALHXFT_PSY_A_CORE
Chart reviewed. Patient was tachycardic to 130s this AM on tele, with resolution prior to the examination. Otherwise, vital signs have been stable. Patient received lorazepam 1mg PO at 5 PM on 10/9 as PRN for agitation. Patient states that his mood is "fine." Patient reports fragmented sleep throughout the night. Patient continues to spit up food during feeding due to a sensation that "someone else is swallowing for me." Per nurse, patient continues to ask for food and has been hoarding food in a bag on his bed. Patient endorses the continued belief that individuals on TV are able to "transfer" thoughts into his mind. Patient states that other individuals have the ability to influence his thoughts and activities through a process called "psychokinetics." Patient endorses feeling safe in the hospital. Patient denies any SIIP or HIIP.

## 2023-10-10 NOTE — PROGRESS NOTE ADULT - PROBLEM SELECTOR PLAN 2
-psych c/s appreciated: pt with estella psychosis; cannot leave AMA but no current need for 1:1  -olanzapine discontinued i/s/o prolonged QTc and current thrombocytopenia, and no significant clinical improvement  -repeat ECG ordered to evaluate for QTc prolongation  -Start lorazepam 1mg q8h PRN PO/IM/IV for severe agitation per Psych  -CTH with subacute/chronic L frontal lobe cortical infarct (new compared to Aug 2023)  -neuro c/s iso new infarct  -F/u MRI/CT Head, pt reports claustrophobia, difficulty tolerating even with premed -C/w HD MWF  -appreciate Nephro recs  -states he produces minimal urine  -reports urinary complaints (hematuria, frequency, dysuria iso not taking opioids)  -UA with small LE, few bacteria, low c/f UTI

## 2023-10-10 NOTE — BH CONSULTATION LIAISON PROGRESS NOTE - NSBHASSESSMENTFT_PSY_ALL_CORE
61-yo AAM, single, reportedly domiciled with his mother, patient denying any prior psychiatric history (was treated in [facility] 25 years ago with haldol for unknown symptoms/diagnosis and never followed up after release from [facility]) and history of alcohol abuse (reports quitting "long time ago"), PMH of HCV, Hx of esophageal stricture (S/p stenting in 5/22), Emphysema, chronic pancreatitis, multiple prior medical hospitalizations for confusion, pancreatitis, etc., psych c/s for psychosis.    Patient exhibiting estella psychosis - has delusions, paranoia, referential thoughts, thought insertion, grandose/hyperreligiousness. Given CKD and HD, concern for lewy body dementia and avoidance of EPS, would begin Olanzapine though must monitor QTc and LFTs which are already somewhat elevated. Needs further psych assessment. Cognitively intact despite worse psychosis than prior hospitalizations. No current signs of etoh withdrawal despite 0 etoh, no current role for CIWA unless such features abruptly begin.     10/9: Patient was calm but continues to exhibit estella psychosis. These features were similar in content to the examination on 10/6, particularly in regard to delusions, referential thoughts, thought insertion, and grandiose ideology. Patient found to have subacute/chronic L frontal lobe cortical infarct on CT head that was new compared to scan in August of 2023. QTc prolonged.    10/10: Patient was calm with no significant alterations in his state of psychosis. These features reflected those observed on previous examinations, with delusions, referential thoughts, though insertion, and grandiose ideology. Repeat EKG on 10/9 demonstrated a QTc of 462 ms. Patient can be started on standing haloperidol 1mg PO qHS at night for psychosis due to minimal interference with platelet function in the setting of consistent thrombocytopenia.     Recs:  - no current need for 1:1  - cannot leave AMA needs further psych eval, patient psychotic  - Discontinue standing Olanzapine 2.5mg qHS in the setting of QTc prolongation and continued thrombocytopenia  - Repeat EKG on 10/9 demonstrated QTc of 462 ms   - Recommend lorazepam 1mg q8h PRN PO/IM/IV for severe agitation - Will not prolong QTc interval  - Discontinue PRN severe agitation Olanzapine 2.5mg q6h PRN PO/IM  - Recommend standing haloperidol 1mg PO qHS at night *Please continue to monitor QTc interval and HOLD for QTc >500 ms*  - s/p empiric thiamine repletion 500mg IV TID for 3d (completed 10/9)  - CT head with subacute/chronic L frontal lobe cortical infarct, f/u MRI head per neurology - patient's mental status much worse than prior hospitalizations  - Psych will follow

## 2023-10-10 NOTE — PROGRESS NOTE ADULT - PROBLEM SELECTOR PLAN 7
-BNP >18683, troponin 131  -clinically euvolemic and no CP, low concern for ACS; elevation possibly iso poor renal clearance  -repeat EKG not concerning for acute MI  -monitor I/Os, daily weights -BNP >31628, troponin 131  -clinically euvolemic and no CP, low concern for ACS; elevation possibly iso poor renal clearance  -monitor I/Os, daily weights  based on TTE in 12/2022 with "mild global LV systolic dysfunction" without specific EF measurement. Prior echo reports from 2019 show LVEF >60%  -repeat echo with bubble study today 10/10

## 2023-10-10 NOTE — PROGRESS NOTE ADULT - SUBJECTIVE AND OBJECTIVE BOX
IMELDA ROBERTSON  63y  Male      Patient is a 63y old  Male who presents with a chief complaint of chronic pancreatitis pain (08 Oct 2023 07:03)      INTERVAL HPI/OVERNIGHT EVENTS:  ***  - NAEO. VS stable, on RA, SpO2 100%.  - S/p HD this AM  - Reports continued pain "in the pancreas and in the back"; states that pain medication wears off before the next dose is due, however, no acute worsening of quality/intensity/frequency of pain.  - Denies lightheadedness, fevers, chills, headache, chest pain, dyspnea, nausea, emesis.  - AOx3 and appears to respond many questions appropriately. Occasionally makes comments that seem illogical, became upset that "nobody believes what I'm saying" and asks "who do you think she [anchor on TV] is talking to?"      REVIEW OF SYSTEMS:  CONSTITUTIONAL: No fever, weight loss, or fatigue  ENMT: No sinus or throat pain  NECK: No pain or stiffness  RESPIRATORY: No cough, wheezing, chills or hemoptysis; No shortness of breath  CARDIOVASCULAR: No chest pain, palpitations, dizziness, or leg swelling  GASTROINTESTINAL: No abdominal or epigastric pain. No nausea, vomiting, or hematemesis; No diarrhea or constipation. No melena or hematochezia.  GENITOURINARY: No dysuria, frequency, hematuria, or incontinence  NEUROLOGICAL: No headaches,, loss of strength, numbness, or tremors  SKIN: No itching, burning, rashes, or lesions   MUSCULOSKELETAL: No joint pain or swelling; No muscle, back, or extremity pain    FAMILY HISTORY:  FH: HTN (hypertension)      ICU Vital Signs Last 24 Hrs  T(C): 36.9 (10 Oct 2023 05:50), Max: 37 (09 Oct 2023 23:33)  T(F): 98.4 (10 Oct 2023 05:50), Max: 98.6 (09 Oct 2023 23:33)  HR: 95 (10 Oct 2023 05:50) (71 - 99)  BP: 129/83 (10 Oct 2023 05:50) (118/71 - 149/80)  BP(mean): --  ABP: --  ABP(mean): --  RR: 17 (10 Oct 2023 05:50) (16 - 18)  SpO2: 100% (10 Oct 2023 05:50) (99% - 100%)    O2 Parameters below as of 10 Oct 2023 05:50  Patient On (Oxygen Delivery Method): room air            Tylenol (Other)      PHYSICAL EXAM:  GENERAL: cachectic, chronically ill-appearing, conversable  HEAD:  Atraumatic, Normocephalic  EYES: EOMI, PERRLA, conjunctiva and sclera clear, teary eyes  ENMT: Moist mucous membranes, Good dentition, No lesions  NECK: Supple, No JVD, Normal thyroid  CHEST/LUNG: Clear to auscultation bilaterally; No rales, rhonchi, wheezing, or rubs  HEART: Regular rate and rhythm; No murmurs, rubs, or gallops  ABDOMEN: Soft, Nontender, Nondistended; Bowel sounds present  EXTREMITIES:  2+ Peripheral Pulses, No clubbing, cyanosis, or edema  LYMPH: No lymphadenopathy noted  SKIN: No rashes or lesions  NERVOUS SYSTEM:  Alert & Oriented X3, Good concentration; Motor Strength 5/5 B/L upper and lower extremities;   PSYCH: Occasionally makes illogical remarks that seem to suggest psychosis/delusions    Consultant(s) Notes Reviewed:  [x ] YES  [ ] NO  Care Discussed with Consultants/Other Providers [ x] YES  [ ] NO      LABS:                        9.5    5.63  )-----------( 52       ( 09 Oct 2023 06:00 )             29.3     10-09    142  |  108<H>  |  44<H>  ----------------------------<  88  3.7   |  19<L>  |  4.74<H>    Ca    7.4<L>      09 Oct 2023 06:00  Phos  3.2     10-09  Mg     2.10     10-09    TPro  4.3<L>  /  Alb  2.3<L>  /  TBili  0.3  /  DBili  x   /  AST  30  /  ALT  62<H>  /  AlkPhos  247<H>  10-09        CAPILLARY BLOOD GLUCOSE                  Urinalysis Basic - ( 09 Oct 2023 06:00 )    Color: x / Appearance: x / SG: x / pH: x  Gluc: 88 mg/dL / Ketone: x  / Bili: x / Urobili: x   Blood: x / Protein: x / Nitrite: x   Leuk Esterase: x / RBC: x / WBC x   Sq Epi: x / Non Sq Epi: x / Bacteria: x            I & O Summary:    10-08-23 @ 07:01  -  10-09-23 @ 07:00  --------------------------------------------------------  IN: 200 mL / OUT: 150 mL / NET: 50 mL    10-09-23 @ 07:01  -  10-10-23 @ 06:51  --------------------------------------------------------  IN: 1150 mL / OUT: 2543 mL / NET: -1393 mL        Microbiology:                  RADIOLOGY & ADDITIONAL TESTS:    Imaging Personally Reviewed:  [ ] YES  [ ] NO  gabapentin 100 milliGRAM(s) Oral at bedtime  heparin   Injectable 5000 Unit(s) SubCutaneous every 12 hours  lidocaine   4% Patch 1 Patch Transdermal daily  multivitamin 1 Tablet(s) Oral daily  OLANZapine 2.5 milliGRAM(s) Oral at bedtime  OLANZapine Injectable 2.5 milliGRAM(s) IntraMuscular every 6 hours PRN  oxyCODONE    IR 10 milliGRAM(s) Oral every 4 hours PRN  pancrelipase  (CREON  6,000 Lipase Units) 1 Capsule(s) Oral three times a day with meals  polyethylene glycol 3350 17 Gram(s) Oral daily  senna 2 Tablet(s) Oral at bedtime  thiamine IVPB 500 milliGRAM(s) IV Intermittent three times a day      HEALTH ISSUES - PROBLEM Dx:  ESRD on hemodialysis    Bicytopenia    DVT (deep venous thrombosis)    Chronic systolic congestive heart failure    Need for prophylactic measure    Chronic pancreatitis    Cachexia    Psychosis             IMELDA ROBERTSON  63y  Male      Patient is a 63y old  Male who presents with a chief complaint of chronic pancreatitis pain (08 Oct 2023 07:03)      INTERVAL HPI/OVERNIGHT EVENTS:  - 2 episodes of sinus tachycardia to 120-130s which self resolved.  - Afebrile, on RA, SpO2 >99%  - Patient has ordered lots of food, upset that the hospital food is not to his liking -- "I want chicken gizzards, brisket, whole milk, bread."  - In the PM, patient was seen to be tachycardic again to 120s, more agitated and yelling at staff, copious amount of oral secretions, constantly coughing and spitting out food. Seen with very teary eyes. RR 30s. Patient eventually calmed down more and no active respiratory distress.  - Denies difficulty swallowing and reports that "he makes my throat itchy and I cough up food" since April. Does not elaborate further.      REVIEW OF SYSTEMS:  CONSTITUTIONAL: No fever, weight loss, or fatigue  ENMT: No sinus or throat pain  NECK: No pain or stiffness  RESPIRATORY: No cough, wheezing, chills or hemoptysis; No shortness of breath  CARDIOVASCULAR: No chest pain, palpitations, dizziness, or leg swelling  GASTROINTESTINAL: No abdominal or epigastric pain. No nausea, vomiting, or hematemesis; No diarrhea or constipation. No melena or hematochezia.  GENITOURINARY: No dysuria, frequency, hematuria, or incontinence  NEUROLOGICAL: No headaches,, loss of strength, numbness, or tremors  SKIN: No itching, burning, rashes, or lesions   MUSCULOSKELETAL: No joint pain or swelling; No muscle, back, or extremity pain    FAMILY HISTORY:  FH: HTN (hypertension)      ICU Vital Signs Last 24 Hrs  T(C): 36.9 (10 Oct 2023 05:50), Max: 37 (09 Oct 2023 23:33)  T(F): 98.4 (10 Oct 2023 05:50), Max: 98.6 (09 Oct 2023 23:33)  HR: 95 (10 Oct 2023 05:50) (71 - 99)  BP: 129/83 (10 Oct 2023 05:50) (118/71 - 149/80)  BP(mean): --  ABP: --  ABP(mean): --  RR: 17 (10 Oct 2023 05:50) (16 - 18)  SpO2: 100% (10 Oct 2023 05:50) (99% - 100%)    O2 Parameters below as of 10 Oct 2023 05:50  Patient On (Oxygen Delivery Method): room air            Tylenol (Other)      PHYSICAL EXAM:  GENERAL: cachectic, chronically ill-appearing, conversable  HEAD:  Atraumatic, Normocephalic  EYES: EOMI, PERRLA, conjunctiva and sclera clear, teary eyes  ENMT: Moist mucous membranes, Good dentition, No lesions  NECK: Supple, No JVD, Normal thyroid  CHEST/LUNG: Clear to auscultation bilaterally; No rales, rhonchi, wheezing, or rubs  HEART: Regular rate and rhythm; No murmurs, rubs, or gallops  ABDOMEN: Soft, Nontender, Nondistended; Bowel sounds present  EXTREMITIES:  2+ Peripheral Pulses, No clubbing, cyanosis, or edema  LYMPH: No lymphadenopathy noted  SKIN: No rashes or lesions  NERVOUS SYSTEM:  Alert & Oriented X3, Good concentration; Motor Strength 5/5 B/L upper and lower extremities;   PSYCH: Occasionally makes illogical remarks that seem to suggest psychosis/delusions    Consultant(s) Notes Reviewed:  [x ] YES  [ ] NO  Care Discussed with Consultants/Other Providers [ x] YES  [ ] NO      LABS:                        9.5    5.63  )-----------( 52       ( 09 Oct 2023 06:00 )             29.3     10-09    142  |  108<H>  |  44<H>  ----------------------------<  88  3.7   |  19<L>  |  4.74<H>    Ca    7.4<L>      09 Oct 2023 06:00  Phos  3.2     10-09  Mg     2.10     10-09    TPro  4.3<L>  /  Alb  2.3<L>  /  TBili  0.3  /  DBili  x   /  AST  30  /  ALT  62<H>  /  AlkPhos  247<H>  10-09        CAPILLARY BLOOD GLUCOSE                  Urinalysis Basic - ( 09 Oct 2023 06:00 )    Color: x / Appearance: x / SG: x / pH: x  Gluc: 88 mg/dL / Ketone: x  / Bili: x / Urobili: x   Blood: x / Protein: x / Nitrite: x   Leuk Esterase: x / RBC: x / WBC x   Sq Epi: x / Non Sq Epi: x / Bacteria: x            I & O Summary:    10-08-23 @ 07:01  -  10-09-23 @ 07:00  --------------------------------------------------------  IN: 200 mL / OUT: 150 mL / NET: 50 mL    10-09-23 @ 07:01  -  10-10-23 @ 06:51  --------------------------------------------------------  IN: 1150 mL / OUT: 2543 mL / NET: -1393 mL        Microbiology:                  RADIOLOGY & ADDITIONAL TESTS:    Imaging Personally Reviewed:  [ ] YES  [ ] NO  gabapentin 100 milliGRAM(s) Oral at bedtime  heparin   Injectable 5000 Unit(s) SubCutaneous every 12 hours  lidocaine   4% Patch 1 Patch Transdermal daily  multivitamin 1 Tablet(s) Oral daily  OLANZapine 2.5 milliGRAM(s) Oral at bedtime  OLANZapine Injectable 2.5 milliGRAM(s) IntraMuscular every 6 hours PRN  oxyCODONE    IR 10 milliGRAM(s) Oral every 4 hours PRN  pancrelipase  (CREON  6,000 Lipase Units) 1 Capsule(s) Oral three times a day with meals  polyethylene glycol 3350 17 Gram(s) Oral daily  senna 2 Tablet(s) Oral at bedtime  thiamine IVPB 500 milliGRAM(s) IV Intermittent three times a day      HEALTH ISSUES - PROBLEM Dx:  ESRD on hemodialysis    Bicytopenia    DVT (deep venous thrombosis)    Chronic systolic congestive heart failure    Need for prophylactic measure    Chronic pancreatitis    Cachexia    Psychosis

## 2023-10-11 NOTE — PROGRESS NOTE ADULT - PROBLEM SELECTOR PLAN 5
-nutrition consult appreciated  -increased risk of aspiration with continued coughing with swallowing food and spitting out with copious oral secretions --> NPO, resume regular diet pending successful bedside swallow test -CT A/P 10/5: Sequela of chronic pancreatitis with mild common bile duct dilatation unchanged. The prior peripancreatic foci of air have resolved.  -pain management c/s appreciated: gabapentin 100 bedtime; oxy 10 mg PO q4 prn; lidocaine patch x5d; outpt f/u  -Chronic pain recommends considering gradually weaning off opioids as tolerated (oxy dose reduction by 10% weekly, and continue f/u with outpatient Pain Medicine) and maximizing non-opioid analgesia as appropriate, as pain stable and patient does not have definite organic cause that opioids are actively treating -- appreciate recs  -Could consider Suboxone, patient previously declined  -c/w pancrelipase

## 2023-10-11 NOTE — PROVIDER CONTACT NOTE (HYPOGLYCEMIA EVENT) - NS PROVIDER CONTACT CONTRIBUTING FACTORS OF EPISODE
Patient NPO greater than 8 hours/Previous finger stick less than 100 mg/dL/Chronic kidney disease/None
Patient NPO greater than 8 hours/Other (Specify)

## 2023-10-11 NOTE — PROGRESS NOTE ADULT - PROBLEM SELECTOR PLAN 8
-DVT ppx: SQ heparin -BNP >36777, troponin 131  -clinically euvolemic and no CP, low concern for ACS; elevation possibly iso poor renal clearance  -monitor I/Os, daily weights  - TTE in 12/2022 with "mild global LV systolic dysfunction" without specific EF measurement. Prior echo reports from 2019 show LVEF >60%  -pending repeat echo with bubble study

## 2023-10-11 NOTE — PROGRESS NOTE ADULT - TIME BILLING
-review of the history and records  -general and neurological examination  -generation of assessment and treatment plan  -communication with the patient/family members  -coordination of care.
Reviewing test results and documents in [facility], performing a history and physical exam, counseling and educating the patient and/or family, ordering medications and testing, communicating with other healthcare professionals, documenting clinical information in the electronic health record, and independently interpreting results and communicating results to the patient and/or family.
Reviewing test results and documents in [place], performing a history and physical exam, counseling and educating the patient and/or family, ordering medications and testing, communicating with other healthcare professionals, documenting clinical information in the electronic health record, and independently interpreting results and communicating results to the patient and/or family.
Time spent includes direct patient care  (interview and examination of patient), discussion with other providers, support staff and/or patient's family members, review of medical records, ordering diagnostic tests and analyzing results, and documentation.

## 2023-10-11 NOTE — PROGRESS NOTE ADULT - PROBLEM SELECTOR PLAN 7
-BNP >59844, troponin 131  -clinically euvolemic and no CP, low concern for ACS; elevation possibly iso poor renal clearance  -monitor I/Os, daily weights  based on TTE in 12/2022 with "mild global LV systolic dysfunction" without specific EF measurement. Prior echo reports from 2019 show LVEF >60%  -repeat echo with bubble study today 10/10 -previously on apixaban, reportedly for chronic R brachial DVT per [employer] review (doppler from 1/26/23 noted acute DVT at that time, cannot find repeat doppler confirming chronicity in EMR)  -Doppler August 2023 with no DVT in RUE  -switched to SQ heparin

## 2023-10-11 NOTE — SWALLOW BEDSIDE ASSESSMENT ADULT - SWALLOW EVAL: DIAGNOSIS
1- Functional stage for puree, regular solids, and thin liquids marked by adequate oral containment, adequate bolus manipulation, adequate mastication, adequate anterior to posterior transfer/transit with oral clearance noted. 2- Functional pharyngeal stage for puree, regular solids, and thin liquids marked by initiation of pharyngeal swallow trigger and hyolaryngeal excursion upon palpation without evidence of impaired airway protection.

## 2023-10-11 NOTE — PROGRESS NOTE ADULT - SUBJECTIVE AND OBJECTIVE BOX
[facility] Division of Kidney Diseases & Hypertension  FOLLOW UP NOTE  [phone]--------------------------------------------------------------------------------    Chief Complaint: ESRD/HD management    24 hour events/subjective: Pt seen and evaluated while on HD today. Pt feels hungry and would like to have larger meals. Pain is well controlled. Has a persistent cough. Denies HA, CP, SOB, trouble swallowing, n/v, or leg swelling.    PAST HISTORY  --------------------------------------------------------------------------------  No significant changes to PMH, PSH, FHx, SHx, unless otherwise noted    ALLERGIES & MEDICATIONS  --------------------------------------------------------------------------------  Allergies  Tylenol (Other)    Intolerances    Standing Inpatient Medications  chlorhexidine 2% Cloths 1 Application(s) Topical daily  dextrose 5%. 1000 milliLiter(s) IV Continuous <Continuous>  dextrose 5%. 1000 milliLiter(s) IV Continuous <Continuous>  dextrose 50% Injectable 12.5 Gram(s) IV Push once  dextrose 50% Injectable 25 Gram(s) IV Push once  dextrose 50% Injectable 25 Gram(s) IV Push once  gabapentin 100 milliGRAM(s) Oral at bedtime  glucagon  Injectable 1 milliGRAM(s) IntraMuscular once  haloperidol    Injectable 1 milliGRAM(s) IntraMuscular at bedtime  heparin   Injectable 5000 Unit(s) SubCutaneous every 12 hours  multivitamin 1 Tablet(s) Oral daily  pancrelipase  (CREON  6,000 Lipase Units) 1 Capsule(s) Oral three times a day with meals  polyethylene glycol 3350 17 Gram(s) Oral daily  senna 2 Tablet(s) Oral at bedtime    PRN Inpatient Medications  dextrose Oral Gel 15 Gram(s) Oral once PRN  LORazepam   Injectable 1 milliGRAM(s) IV Push once PRN  LORazepam   Injectable 1 milliGRAM(s) IV Push every 8 hours PRN  oxyCODONE    IR 10 milliGRAM(s) Oral every 4 hours PRN    REVIEW OF SYSTEMS  --------------------------------------------------------------------------------  Gen: No fevers/chills  Head/Eyes/Ears/Mouth: No headache  Respiratory: No dyspnea, +cough  CV: No chest pain  GI: No abdominal pain, diarrhea, constipation, nausea, vomiting  MSK: No joint pain, no edema  Neuro: No dizziness/lightheadedness, +weakness    All other systems were reviewed and are negative, except as noted.    VITALS/PHYSICAL EXAM  --------------------------------------------------------------------------------  T(C): 36.1 (10-11-23 @ 11:00), Max: 36.8 (10-10-23 @ 21:42)  HR: 93 (10-11-23 @ 11:00) (86 - 101)  BP: 134/80 (10-11-23 @ 11:00) (106/69 - 136/83)  RR: 17 (10-11-23 @ 11:00) (17 - 19)  SpO2: 98% (10-11-23 @ 05:38) (98% - 100%)  Wt(kg): --    10-11-23 @ 07:01  -  10-11-23 @ 18:49  --------------------------------------------------------  IN: 500 mL / OUT: 1179 mL / NET: -679 mL    Physical Exam:  Gen: NAD, appears cachectic  HEENT: No JVD  Pulm: CTA B/L  CV: S1S2+  Abd: Soft, +BS   Ext: No B/L LE edema  Neuro: Awake  Skin: Warm and dry  Vascular access: LUE AVF site: dressing seen, thrill felt    LABS/STUDIES  --------------------------------------------------------------------------------              12.4   6.70  >-----------<  86       [10-11-23 @ 05:15]              36.8     146  |  107  |  47  ----------------------------<  25      [10-11-23 @ 05:15]  3.5   |  22  |  4.97        Ca     7.9     [10-11-23 @ 05:15]      Mg     1.90     [10-11-23 @ 05:15]      Phos  3.7     [10-11-23 @ 05:15]    TPro  5.4  /  Alb  2.8  /  TBili  0.6  /  DBili  x   /  AST  25  /  ALT  63  /  AlkPhos  268  [10-11-23 @ 05:15]    Creatinine Trend:  SCr 4.97 [10-11 @ 05:15]  SCr 3.86 [10-10 @ 05:25]  SCr 4.74 [10-09 @ 06:00]  SCr 4.15 [10-08 @ 07:07]  SCr 3.27 [10-07 @ 06:55]    Iron 45, TIBC 84, %sat 54      [10-06-23 @ 06:40]  Ferritin 1081      [10-06-23 @ 06:40]    HBsAb 5.2      [10-05-23 @ 20:40]  HBsAg Nonreact      [10-05-23 @ 20:40]  HCV 5.37, Reactive      [10-05-23 @ 20:40]

## 2023-10-11 NOTE — PROGRESS NOTE ADULT - PROBLEM SELECTOR PLAN 2
-C/w HD MWF  -appreciate Nephro recs  -states he produces minimal urine  -reports urinary complaints (hematuria, frequency, dysuria iso not taking opioids)  -UA with small LE, few bacteria, low c/f UTI - CTH with subacute/chronic L frontal lobe cortical infarct (new compared to Aug 2023)  - Patient to remain on tele while admitted per Neuro  - Pending CTH  - S/p CTA H/N 10/11 with no new progression; subacute-chronic L frontal lobe cortical infarct, no large vessel occlusion or major stenosis.  - Pending TTE with bubble study  - Neurology following -- appreciate recs

## 2023-10-11 NOTE — SWALLOW BEDSIDE ASSESSMENT ADULT - SWALLOW EVAL: RECOMMENDED FEEDING/EATING TECHNIQUES
Swallowing Guidelines: Seated Upright during Mealtimes; Slow Pacing; Small Bites and Chew Solids Well; Small Sips; Allow for Swallow Prior to Next Presentation; Maintain Oral Hygiene

## 2023-10-11 NOTE — PROVIDER CONTACT NOTE (CRITICAL VALUE NOTIFICATION) - ACTION/TREATMENT ORDERED:
no intervention at this time
Provider notified and aware, D 50 IV ordered and administered by HD RN
MD notified

## 2023-10-11 NOTE — PROGRESS NOTE ADULT - PROBLEM SELECTOR PLAN 4
-CT A/P 10/5: Sequela of chronic pancreatitis with mild common bile duct dilatation unchanged. The prior peripancreatic foci of air have resolved.  -pain management c/s appreciated: gabapentin 100 bedtime; oxy 10 mg PO q4 prn; lidocaine patch x5d; outpt f/u  -Chronic pain recommends considering gradually weaning off opioids as tolerated (oxy dose reduction by 10% weekly, and continue f/u with outpatient Pain Medicine) and maximizing non-opioid analgesia as appropriate, as pain stable and patient does not have definite organic cause that opioids are actively treating -- appreciate recs  -Could consider Suboxone, patient previously declined  -c/w pancrelipase #macrocytic anemia, likely i/s/o chronic illness/nutritional deficiency  trend H/H -- stable    #thrombocytopenia   evaluated by heme on prior admission, thought to be multifactorial   chronic/stable  monitor/trend

## 2023-10-11 NOTE — PROGRESS NOTE ADULT - ATTENDING COMMENTS
63M w/ ESRD on HD, chronic systolic HF (2022 - "mild global systolic dysfunction"), alcohol use, h/o HCV (most recent VL negative), chronic pancreatitis (taking chronic opioids), COPD, h/o esophageal stricture (stented 5/22), h/o DVT (no longer on a/c), recent hospitalization for possible opioid overdose requiring intubation presenting with acute on chronic abdominal pain in setting of running out of pain medications at home. Found to have new psychosis – psychiatry consulted and as part of this workup received CTH, which showed L frontal lobe cortical subacute/chronic infarct.    Episode of not being able to swallow/aspiration concern last night- patient describes paranoid delusion of someone controlling his swallowing and trying to make him choke. Made NPO overnight. CXR with c/f aspiration, though no clinical c/f pneumonia. This morning tolerated bedside swallow and so will resume PO medications and diet.    - Psychosis - unclear etiology (no known prior psych diagnoses); appreciate psychiatry involvement; start haldol per psych given improved Qtc and least likely to effect platelets, s/p thiamine 500mg IV TID x3 days (10/6-10/9)  - Subacute/chronic L frontal lobe cortical infarct - appreciate neurology involvement; pending repeat CTH (given wait for MRI); neuro rec no ASA given thrombocytopenia; monitor on telemetry   - Systolic heart failure - based on TTE in 2022 without assessment of EF and prior ECHO w/ normal EF - repeat ECHO- if truly HFrEF would benefit from ischemic evaluation and starting GDMT (inpt vs outpt)  - Thrombocytopenia - on and off since 2020; hematology evaluated in 9/2023 and felt multifactorial  - Chronic pancreatitis - pain service consulted (now signed off) - pain currently well controlled on current regimen - recommend consideration of slow taper vs transition to suboxone as outpatient  - ESRD on HD - renal consulted for HD

## 2023-10-11 NOTE — PROVIDER CONTACT NOTE (HYPOGLYCEMIA EVENT) - NS PROVIDER CONTACT RECOMMEND-HYPO
Patient is NPO, admitted with abdominal Pain and pancreatititis. Blood Glucose post D 50% 
Swallow test repeated, Patient passed, and he is currently on regular diet as per Dr Bryant

## 2023-10-11 NOTE — PROGRESS NOTE ADULT - SUBJECTIVE AND OBJECTIVE BOX
INTERVAL HISTORY: Patient seen at bedside. S/S team evaluating.     PAST MEDICAL & SURGICAL HISTORY:  ETOH abuse  sober x1 year approximately      Hepatitis C  treated      Gout      HTN (hypertension)      H/O chronic pancreatitis      Wound of right foot      ESRD on hemodialysis      DVT (deep venous thrombosis)      Gastric perforation      AVF (arteriovenous fistula)        FAMILY HISTORY:  FH: HTN (hypertension)      SOCIAL HISTORY:   T/E/D:   Occupation:   Lives with:     MEDICATIONS (HOME):  Home Medications:  apixaban 5 mg oral tablet: 1 tab(s) orally 2 times a day (06 Oct 2023 02:05)  Multiple Vitamins oral tablet: 1 tab(s) orally once a day (06 Oct 2023 02:05)  Narcan 4 mg/0.1 mL nasal spray: 4 milligram(s) intranasally once as needed for  overdose (06 Oct 2023 02:05)  oxycodone-acetaminophen 5 mg-325 mg oral tablet: 2 tab(s) orally every 4 hours as needed for  pain (06 Oct 2023 02:05)  pancrelipase 6000 units-19,000 units-30,000 units oral delayed release capsule: 1 cap(s) orally 3 times a day (with meals) (06 Oct 2023 02:05)    MEDICATIONS  (STANDING):  chlorhexidine 2% Cloths 1 Application(s) Topical daily  dextrose 5%. 1000 milliLiter(s) (100 mL/Hr) IV Continuous <Continuous>  dextrose 5%. 1000 milliLiter(s) (50 mL/Hr) IV Continuous <Continuous>  dextrose 50% Injectable 12.5 Gram(s) IV Push once  dextrose 50% Injectable 25 Gram(s) IV Push once  dextrose 50% Injectable 25 Gram(s) IV Push once  gabapentin 100 milliGRAM(s) Oral at bedtime  glucagon  Injectable 1 milliGRAM(s) IntraMuscular once  haloperidol    Injectable 1 milliGRAM(s) IntraMuscular at bedtime  heparin   Injectable 5000 Unit(s) SubCutaneous every 12 hours  multivitamin 1 Tablet(s) Oral daily  pancrelipase  (CREON  6,000 Lipase Units) 1 Capsule(s) Oral three times a day with meals  polyethylene glycol 3350 17 Gram(s) Oral daily  senna 2 Tablet(s) Oral at bedtime    MEDICATIONS  (PRN):  dextrose Oral Gel 15 Gram(s) Oral once PRN Blood Glucose LESS THAN 70 milliGRAM(s)/deciliter  LORazepam   Injectable 1 milliGRAM(s) IV Push every 8 hours PRN Agitation  LORazepam   Injectable 1 milliGRAM(s) IV Push once PRN Anxiety  oxyCODONE    IR 10 milliGRAM(s) Oral every 4 hours PRN Mod-severe pain    ALLERGIES/INTOLERANCES:  Allergies  Tylenol (Other)    Intolerances    VITALS & EXAMINATION:  Vital Signs Last 24 Hrs  T(C): 36.1 (11 Oct 2023 11:00), Max: 36.8 (10 Oct 2023 21:42)  T(F): 96.9 (11 Oct 2023 11:00), Max: 98.2 (10 Oct 2023 21:42)  HR: 93 (11 Oct 2023 11:00) (86 - 101)  BP: 134/80 (11 Oct 2023 11:00) (106/69 - 136/83)  BP(mean): --  RR: 17 (11 Oct 2023 11:00) (17 - 21)  SpO2: 98% (11 Oct 2023 05:38) (98% - 100%)    Parameters below as of 11 Oct 2023 11:00  Patient On (Oxygen Delivery Method): room air        General:  Constitutional: Male, appears stated age, in no apparent distress including pain  Head: Normocephalic & Atraumatic.  Respiratory: Breathing comfortably.  Extremities: No cyanosis, clubbing, or edema    Neurological:  MS: Awake, alert, oriented to person, place, situation, time. Follows all commands.    Language: Speech is clear, fluent with good repetition & comprehension.    CNs: PERRL (R = 3mm, L = 3mm). VFF. EOMI no nystagmus. V1-3 intact to LT b/l. No facial asymmetry b/l, full eye closure strength b/l. Hearing grossly normal (rubbing fingers) b/l. Tongue midline, normal movements, no atrophy.     Motor: Normal muscle bulk & tone. No noticeable tremor. No pronator drift.              Deltoid	Biceps	Triceps	   R	         5	             5	              5	 5	  		 	  L	         5	             5	              5	 5	  		    	H-Flex	K-Flex	K-Ext	D-Flex	P-Flex  R	    5	            5	           5	            5	           5		   L	    5	            5	           5	            5	           5		     Sensation: Intact to LT b/l throughout.     Cortical: Extinction on DSS (neglect): none    Reflexes:              Biceps(C5)       BR(C6)     Triceps(C7)               Patellar(L4)    Achilles(S1)    Plantar Resp  R	              2	          2	             2		                              2		    2		      Down   L	              2	          2	             2		                              2		    2		      Down     Coordination: intact rapid-alt movements. No dysmetria to FTN/HTS    Gait:     LABORATORY:  CBC                       12.4   6.70  )-----------( 86       ( 11 Oct 2023 05:15 )             36.8     Chem 10-11    146<H>  |  107  |  47<H>  ----------------------------<  25<LL>  3.5   |  22  |  4.97<H>    Ca    7.9<L>      11 Oct 2023 05:15  Phos  3.7     10-11  Mg     1.90     10-11    TPro  5.4<L>  /  Alb  2.8<L>  /  TBili  0.6  /  DBili  x   /  AST  25  /  ALT  63<H>  /  AlkPhos  268<H>  10-11    LFTs LIVER FUNCTIONS - ( 11 Oct 2023 05:15 )  Alb: 2.8 g/dL / Pro: 5.4 g/dL / ALK PHOS: 268 U/L / ALT: 63 U/L / AST: 25 U/L / GGT: x           Coagulopathy   Lipid Panel   A1c   Cardiac enzymes     U/A Urinalysis Basic - ( 11 Oct 2023 05:15 )    Color: x / Appearance: x / SG: x / pH: x  Gluc: 25 mg/dL / Ketone: x  / Bili: x / Urobili: x   Blood: x / Protein: x / Nitrite: x   Leuk Esterase: x / RBC: x / WBC x   Sq Epi: x / Non Sq Epi: x / Bacteria: x      CSF  Immunological  Other    STUDIES & IMAGING:  Studies (EKG, EEG, EMG, etc):     Radiology (XR, CT, MR, U/S, TTE/ISAIAS):    Head CT: A subacute-chronic appearing infarct is again seen within the   left frontal lobe.    Additional chronic lacunar infarcts are again seen within the bilateral   basal ganglia.    There is no acute intracranial hemorrhage, mass effect, shift of the   midline structures, herniation, hydrocephalus, or abnormal intra-axial   fluid collection.    The paranasal sinuses and tympanomastoid cavities are clear. The   calvarium appears intact. The orbits appear unremarkable.    CTA NECK: There is a standard anatomic configuration to the aortic arch.   The origins of the great vessels appear unremarkable.    The bilateral common carotid arteries, carotid bifurcations, and cervical   internal carotid arteries appear grossly unremarkable.    The origins of the bilateral vertebral arteries are normal. The vertebral   arteries are patent throughout the course in the neck.    Numerous collateral venous channels are seen which enhance with contrast   around the spine.    A patchy opacity in the right upper lobe is seen.    CTA HEAD: Mild calcified plaques affect the bilateral carotid siphons   without associated stenosis. Otherwise, the bilateral intracranial   internal carotid, anterior, and middle cerebral arteries appear   unremarkable.    The anterior and bilateral posterior communicating arteries are seen.    The posterior circulation appears patent.    The intracranial venous structures are patent.    IMPRESSION:    Head CT: Stable follow-up CT exam.    CTA neck and head: No large vessel occlusion or major stenosis.    Patchy right upper lobe opacity for which pneumonia is a possibility.   Recommend imaging follow-up to resolution.    --- End of Report ---      [facility] 10/6:    Multiple chronic bilateral basal ganglia: Infarctions on image   26 of series 2. Small subacute/chronic left frontal lobe cortical infarct   on image 43 of series 2  There is periventricular and subcortical white matter hypodensity without   mass effect, nonspecific, likely representing mild chronic microvascular   ischemic changes. There is no other compelling evidence for an acute   transcortical infarction. There is no evidence of mass, mass effect,   midline shift or extra-axial fluid collection. The lateral ventricles and   cortical sulci are age-appropriate in size and configuration. The orbits,   mastoid air cells and visualized paranasal sinuses are unremarkable. The   calvarium is intact. Consider MRI as clinically warranted.    IMPRESSION: Subacute/chronic left frontal lobe cortical infarct. Consider   MRI for further evaluation.    --- End of Report ---         INTERVAL HISTORY: Patient seen at bedside. S/S team evaluating. Patient w/ some mild generalized pain, otherwise no acute complaints. Denies HA, changes in vision, N/V, imbalance, weakness, numbness or tingling, difficulty with speech. Discussed CT results and management moving forward.     PAST MEDICAL & SURGICAL HISTORY:  ETOH abuse  sober x1 year approximately      Hepatitis C  treated      Gout      HTN (hypertension)      H/O chronic pancreatitis      Wound of right foot      ESRD on hemodialysis      DVT (deep venous thrombosis)      Gastric perforation      AVF (arteriovenous fistula)        FAMILY HISTORY:  FH: HTN (hypertension)      SOCIAL HISTORY:   T/E/D:   Occupation:   Lives with:     MEDICATIONS (HOME):  Home Medications:  apixaban 5 mg oral tablet: 1 tab(s) orally 2 times a day (06 Oct 2023 02:05)  Multiple Vitamins oral tablet: 1 tab(s) orally once a day (06 Oct 2023 02:05)  Narcan 4 mg/0.1 mL nasal spray: 4 milligram(s) intranasally once as needed for  overdose (06 Oct 2023 02:05)  oxycodone-acetaminophen 5 mg-325 mg oral tablet: 2 tab(s) orally every 4 hours as needed for  pain (06 Oct 2023 02:05)  pancrelipase 6000 units-19,000 units-30,000 units oral delayed release capsule: 1 cap(s) orally 3 times a day (with meals) (06 Oct 2023 02:05)    MEDICATIONS  (STANDING):  chlorhexidine 2% Cloths 1 Application(s) Topical daily  dextrose 5%. 1000 milliLiter(s) (100 mL/Hr) IV Continuous <Continuous>  dextrose 5%. 1000 milliLiter(s) (50 mL/Hr) IV Continuous <Continuous>  dextrose 50% Injectable 12.5 Gram(s) IV Push once  dextrose 50% Injectable 25 Gram(s) IV Push once  dextrose 50% Injectable 25 Gram(s) IV Push once  gabapentin 100 milliGRAM(s) Oral at bedtime  glucagon  Injectable 1 milliGRAM(s) IntraMuscular once  haloperidol    Injectable 1 milliGRAM(s) IntraMuscular at bedtime  heparin   Injectable 5000 Unit(s) SubCutaneous every 12 hours  multivitamin 1 Tablet(s) Oral daily  pancrelipase  (CREON  6,000 Lipase Units) 1 Capsule(s) Oral three times a day with meals  polyethylene glycol 3350 17 Gram(s) Oral daily  senna 2 Tablet(s) Oral at bedtime    MEDICATIONS  (PRN):  dextrose Oral Gel 15 Gram(s) Oral once PRN Blood Glucose LESS THAN 70 milliGRAM(s)/deciliter  LORazepam   Injectable 1 milliGRAM(s) IV Push every 8 hours PRN Agitation  LORazepam   Injectable 1 milliGRAM(s) IV Push once PRN Anxiety  oxyCODONE    IR 10 milliGRAM(s) Oral every 4 hours PRN Mod-severe pain    ALLERGIES/INTOLERANCES:  Allergies  Tylenol (Other)    Intolerances    VITALS & EXAMINATION:  Vital Signs Last 24 Hrs  T(C): 36.1 (11 Oct 2023 11:00), Max: 36.8 (10 Oct 2023 21:42)  T(F): 96.9 (11 Oct 2023 11:00), Max: 98.2 (10 Oct 2023 21:42)  HR: 93 (11 Oct 2023 11:00) (86 - 101)  BP: 134/80 (11 Oct 2023 11:00) (106/69 - 136/83)  BP(mean): --  RR: 17 (11 Oct 2023 11:00) (17 - 21)  SpO2: 98% (11 Oct 2023 05:38) (98% - 100%)    Parameters below as of 11 Oct 2023 11:00  Patient On (Oxygen Delivery Method): room air        General:  Constitutional: Male, appears stated age, in no apparent distress including pain  Head: Normocephalic & Atraumatic.  Respiratory: Breathing comfortably.    Neurological:  MS: Eyes open. Awake, alert, oriented to person, place, situation, time. Follows all commands. Pleasant, cooperative demeanor.     Language: Speech is clear, fluent with good repetition & comprehension.    CNs: VFF. EOMI no nystagmus. V1-3 intact to LT b/l. No facial asymmetry b/l, full eye closure strength b/l.     Motor: Normal muscle bulk & tone. No noticeable tremor. No drift of UE or LE b/l.    Sensation: Intact to LT b/l throughout.     Cortical: Extinction on DSS (neglect): none    Coordination: No dysmetria to FTN b/l.     Gait: Deferred.        LABORATORY:  CBC                       12.4   6.70  )-----------( 86       ( 11 Oct 2023 05:15 )             36.8     Chem 10-11    146<H>  |  107  |  47<H>  ----------------------------<  25<LL>  3.5   |  22  |  4.97<H>    Ca    7.9<L>      11 Oct 2023 05:15  Phos  3.7     10-11  Mg     1.90     10-11    TPro  5.4<L>  /  Alb  2.8<L>  /  TBili  0.6  /  DBili  x   /  AST  25  /  ALT  63<H>  /  AlkPhos  268<H>  10-11    LFTs LIVER FUNCTIONS - ( 11 Oct 2023 05:15 )  Alb: 2.8 g/dL / Pro: 5.4 g/dL / ALK PHOS: 268 U/L / ALT: 63 U/L / AST: 25 U/L / GGT: x           Coagulopathy   Lipid Panel   A1c   Cardiac enzymes     U/A Urinalysis Basic - ( 11 Oct 2023 05:15 )    Color: x / Appearance: x / SG: x / pH: x  Gluc: 25 mg/dL / Ketone: x  / Bili: x / Urobili: x   Blood: x / Protein: x / Nitrite: x   Leuk Esterase: x / RBC: x / WBC x   Sq Epi: x / Non Sq Epi: x / Bacteria: x      CSF  Immunological  Other    STUDIES & IMAGING:  Studies (EKG, EEG, EMG, etc):     Radiology (XR, CT, MR, U/S, TTE/ISAIAS):    Head CT: A subacute-chronic appearing infarct is again seen within the   left frontal lobe.    Additional chronic lacunar infarcts are again seen within the bilateral   basal ganglia.    There is no acute intracranial hemorrhage, mass effect, shift of the   midline structures, herniation, hydrocephalus, or abnormal intra-axial   fluid collection.    The paranasal sinuses and tympanomastoid cavities are clear. The   calvarium appears intact. The orbits appear unremarkable.    CTA NECK: There is a standard anatomic configuration to the aortic arch.   The origins of the great vessels appear unremarkable.    The bilateral common carotid arteries, carotid bifurcations, and cervical   internal carotid arteries appear grossly unremarkable.    The origins of the bilateral vertebral arteries are normal. The vertebral   arteries are patent throughout the course in the neck.    Numerous collateral venous channels are seen which enhance with contrast   around the spine.    A patchy opacity in the right upper lobe is seen.    CTA HEAD: Mild calcified plaques affect the bilateral carotid siphons   without associated stenosis. Otherwise, the bilateral intracranial   internal carotid, anterior, and middle cerebral arteries appear   unremarkable.    The anterior and bilateral posterior communicating arteries are seen.    The posterior circulation appears patent.    The intracranial venous structures are patent.    IMPRESSION:    Head CT: Stable follow-up CT exam.    CTA neck and head: No large vessel occlusion or major stenosis.    Patchy right upper lobe opacity for which pneumonia is a possibility.   Recommend imaging follow-up to resolution.    --- End of Report ---      CT 10/6:    Multiple chronic bilateral basal ganglia: Infarctions on image   26 of series 2. Small subacute/chronic left frontal lobe cortical infarct   on image 43 of series 2  There is periventricular and subcortical white matter hypodensity without   mass effect, nonspecific, likely representing mild chronic microvascular   ischemic changes. There is no other compelling evidence for an acute   transcortical infarction. There is no evidence of mass, mass effect,   midline shift or extra-axial fluid collection. The lateral ventricles and   cortical sulci are age-appropriate in size and configuration. The orbits,   mastoid air cells and visualized paranasal sinuses are unremarkable. The   calvarium is intact. Consider MRI as clinically warranted.    IMPRESSION: Subacute/chronic left frontal lobe cortical infarct. Consider   MRI for further evaluation.    --- End of Report ---

## 2023-10-11 NOTE — PROGRESS NOTE ADULT - ATTENDING COMMENTS
62M w/ PMH of HTN, ESRD on HD TIW (MWF) at [facility], chronic pancreatitis, and Hep C who presents to the ED complaining of abdominal pain and weakness. Nephrology was consulted for management of ESRD on HD.  +abdominal pain.  Emaciated, poor appetite.  Appetite improving, loves GLUCERNA.  HD today  1.  ESRD--concur with orders reviewed with fellow, HD RN  2.  Protein calorie malnutrition--dietary input for protein, calories.  Chronic pancreatitis related malabsorption, other.  On Creon.  WOULD NOT CONFINE TO NEPRO supplement.  ANY THAT HE'LL DRINK IN QUANTITY.  We'll dialyze if excessive protein and bind PO4.    3.  HYpokalemia--4 K dialysate, increased nutrition  4.  HYpoglycemia--check cortisol, endo input.  Increased nutrition as tolerated  5.  Volume overload-- BNP 70K requires repeat.  NOT SOB but may benefit from decreased dry water weight.      discussed with med team  Wilfredo Cruz MD  contact me on TEAMS

## 2023-10-11 NOTE — PROGRESS NOTE ADULT - SUBJECTIVE AND OBJECTIVE BOX
IMELDA ROBERTSON  63y  Male      Patient is a 63y old  Male who presents with a chief complaint of chronic pancreatitis pain (08 Oct 2023 07:03)      INTERVAL HPI/OVERNIGHT EVENTS:  ***  - 2 episodes of sinus tachycardia to 120-130s which self resolved.  - Afebrile, on RA, SpO2 >99%  - Patient has ordered lots of food, upset that the hospital food is not to his liking -- "I want chicken gizzards, brisket, whole milk, bread."  - In the PM, patient was seen to be tachycardic again to 120s, more agitated and yelling at staff, copious amount of oral secretions, constantly coughing and spitting out food. Seen with very teary eyes. RR 30s. Patient eventually calmed down more and no active respiratory distress.  - Denies difficulty swallowing and reports that "he makes my throat itchy and I cough up food" since April. Does not elaborate further.      REVIEW OF SYSTEMS:  CONSTITUTIONAL: No fever, weight loss, or fatigue  ENMT: No sinus or throat pain  NECK: No pain or stiffness  RESPIRATORY: No cough, wheezing, chills or hemoptysis; No shortness of breath  CARDIOVASCULAR: No chest pain, palpitations, dizziness, or leg swelling  GASTROINTESTINAL: No abdominal or epigastric pain. No nausea, vomiting, or hematemesis; No diarrhea or constipation. No melena or hematochezia.  GENITOURINARY: No dysuria, frequency, hematuria, or incontinence  NEUROLOGICAL: No headaches,, loss of strength, numbness, or tremors  SKIN: No itching, burning, rashes, or lesions   MUSCULOSKELETAL: No joint pain or swelling; No muscle, back, or extremity pain    FAMILY HISTORY:  FH: HTN (hypertension)    ICU Vital Signs Last 24 Hrs  T(C): 36.8 (11 Oct 2023 05:38), Max: 36.8 (10 Oct 2023 21:42)  T(F): 98.2 (11 Oct 2023 05:38), Max: 98.2 (10 Oct 2023 21:42)  HR: 90 (11 Oct 2023 05:38) (90 - 101)  BP: 114/80 (11 Oct 2023 05:38) (114/80 - 138/87)  BP(mean): --  ABP: --  ABP(mean): --  RR: 17 (11 Oct 2023 05:38) (17 - 21)  SpO2: 98% (11 Oct 2023 05:38) (98% - 100%)    O2 Parameters below as of 11 Oct 2023 05:38  Patient On (Oxygen Delivery Method): room air      Tylenol (Other)      PHYSICAL EXAM:  GENERAL: cachectic, chronically ill-appearing, conversable  HEAD:  Atraumatic, Normocephalic  EYES: EOMI, PERRLA, conjunctiva and sclera clear, teary eyes  ENMT: Moist mucous membranes, Good dentition, No lesions  NECK: Supple, No JVD, Normal thyroid  CHEST/LUNG: Clear to auscultation bilaterally; No rales, rhonchi, wheezing, or rubs  HEART: Regular rate and rhythm; No murmurs, rubs, or gallops  ABDOMEN: Soft, Nontender, Nondistended; Bowel sounds present  EXTREMITIES:  2+ Peripheral Pulses, No clubbing, cyanosis, or edema  LYMPH: No lymphadenopathy noted  SKIN: No rashes or lesions  NERVOUS SYSTEM:  Alert & Oriented X3, Good concentration; Motor Strength 5/5 B/L upper and lower extremities;   PSYCH: Occasionally makes illogical remarks that seem to suggest psychosis/delusions    Consultant(s) Notes Reviewed:  [x ] YES  [ ] NO  Care Discussed with Consultants/Other Providers [ x] YES  [ ] NO      LABS:                        12.4   6.70  )-----------( 86       ( 11 Oct 2023 05:15 )             36.8     10-11    146<H>  |  107  |  47<H>  ----------------------------<  25<LL>  3.5   |  22  |  4.97<H>    Ca    7.9<L>      11 Oct 2023 05:15  Phos  3.7     10-11  Mg     1.90     10-11    TPro  5.4<L>  /  Alb  2.8<L>  /  TBili  0.6  /  DBili  x   /  AST  25  /  ALT  63<H>  /  AlkPhos  268<H>  10-11        CAPILLARY BLOOD GLUCOSE      POCT Blood Glucose.: 41 mg/dL (11 Oct 2023 06:38)              Urinalysis Basic - ( 11 Oct 2023 05:15 )    Color: x / Appearance: x / SG: x / pH: x  Gluc: 25 mg/dL / Ketone: x  / Bili: x / Urobili: x   Blood: x / Protein: x / Nitrite: x   Leuk Esterase: x / RBC: x / WBC x   Sq Epi: x / Non Sq Epi: x / Bacteria: x            I & O Summary:      Microbiology:          RADIOLOGY & ADDITIONAL TESTS:    Imaging Personally Reviewed:  [ ] YES  [ ] NO  gabapentin 100 milliGRAM(s) Oral at bedtime  heparin   Injectable 5000 Unit(s) SubCutaneous every 12 hours  lidocaine   4% Patch 1 Patch Transdermal daily  multivitamin 1 Tablet(s) Oral daily  OLANZapine 2.5 milliGRAM(s) Oral at bedtime  OLANZapine Injectable 2.5 milliGRAM(s) IntraMuscular every 6 hours PRN  oxyCODONE    IR 10 milliGRAM(s) Oral every 4 hours PRN  pancrelipase  (CREON  6,000 Lipase Units) 1 Capsule(s) Oral three times a day with meals  polyethylene glycol 3350 17 Gram(s) Oral daily  senna 2 Tablet(s) Oral at bedtime  thiamine IVPB 500 milliGRAM(s) IV Intermittent three times a day      HEALTH ISSUES - PROBLEM Dx:  ESRD on hemodialysis    Bicytopenia    DVT (deep venous thrombosis)    Chronic systolic congestive heart failure    Need for prophylactic measure    Chronic pancreatitis    Cachexia    Psychosis             IMELDA ROBERTSON  63y  Male      Patient is a 63y old  Male who presents with a chief complaint of chronic pancreatitis pain (08 Oct 2023 07:03)      INTERVAL HPI/OVERNIGHT EVENTS:  - Made NPO overnight d/t c/f aspiration. CXR 10/10 with bibasilar opacity, likely chronic  - Hypoglycemic to 25 this AM, s/p D50 IVP, repeat   - S/p IV Dilaudid 0.2 mg x3 for "10/10" abdominal pain  - Afebrile, tachy 100s, on RA, SpO2 >98%  - Tele: sinus tachy with occasional PVCs  - Perseveration on food and delusional thoughts "I created Superman and the people who make Superman" or "I cough because 'he' is playing at me and makes my throat itch"  - Denies lightheadedness, fever, chills, headache, chest pain, dyspnea, nausea, emesis.      REVIEW OF SYSTEMS:  CONSTITUTIONAL: No fever, weight loss, or fatigue  ENMT: No sinus or throat pain  NECK: No pain or stiffness  RESPIRATORY: No cough, wheezing, chills or hemoptysis; No shortness of breath  CARDIOVASCULAR: No chest pain, palpitations, dizziness, or leg swelling  GASTROINTESTINAL: No abdominal or epigastric pain. No nausea, vomiting, or hematemesis; No diarrhea or constipation. No melena or hematochezia.  GENITOURINARY: No dysuria, frequency, hematuria, or incontinence  NEUROLOGICAL: No headaches,, loss of strength, numbness, or tremors  SKIN: No itching, burning, rashes, or lesions   MUSCULOSKELETAL: No joint pain or swelling; No muscle, back, or extremity pain    FAMILY HISTORY:  FH: HTN (hypertension)    ICU Vital Signs Last 24 Hrs  T(C): 36.8 (11 Oct 2023 05:38), Max: 36.8 (10 Oct 2023 21:42)  T(F): 98.2 (11 Oct 2023 05:38), Max: 98.2 (10 Oct 2023 21:42)  HR: 90 (11 Oct 2023 05:38) (90 - 101)  BP: 114/80 (11 Oct 2023 05:38) (114/80 - 138/87)  BP(mean): --  ABP: --  ABP(mean): --  RR: 17 (11 Oct 2023 05:38) (17 - 21)  SpO2: 98% (11 Oct 2023 05:38) (98% - 100%)    O2 Parameters below as of 11 Oct 2023 05:38  Patient On (Oxygen Delivery Method): room air      Tylenol (Other)      PHYSICAL EXAM:  GENERAL: cachectic, chronically ill-appearing, conversable  HEAD:  Atraumatic, Normocephalic  EYES: EOMI, PERRLA, conjunctiva and sclera clear, teary eyes  ENMT: Moist mucous membranes, Good dentition, No lesions  NECK: Supple, No JVD, Normal thyroid  CHEST/LUNG: Clear to auscultation bilaterally; No rales, rhonchi, wheezing, or rubs  HEART: Regular rate and rhythm; No murmurs, rubs, or gallops  ABDOMEN: Soft, Nontender, Nondistended; Bowel sounds present  EXTREMITIES:  2+ Peripheral Pulses, No clubbing, cyanosis, or edema  LYMPH: No lymphadenopathy noted  SKIN: No rashes or lesions  NERVOUS SYSTEM:  Alert & Oriented X3, Good concentration; Motor Strength 5/5 B/L upper and lower extremities;   PSYCH: Occasionally makes illogical comments marked with psychosis/delusions    Consultant(s) Notes Reviewed:  [x ] YES  [ ] NO  Care Discussed with Consultants/Other Providers [ x] YES  [ ] NO      LABS:                        12.4   6.70  )-----------( 86       ( 11 Oct 2023 05:15 )             36.8     10-11    146<H>  |  107  |  47<H>  ----------------------------<  25<LL>  3.5   |  22  |  4.97<H>    Ca    7.9<L>      11 Oct 2023 05:15  Phos  3.7     10-11  Mg     1.90     10-11    TPro  5.4<L>  /  Alb  2.8<L>  /  TBili  0.6  /  DBili  x   /  AST  25  /  ALT  63<H>  /  AlkPhos  268<H>  10-11        CAPILLARY BLOOD GLUCOSE      POCT Blood Glucose.: 41 mg/dL (11 Oct 2023 06:38)              Urinalysis Basic - ( 11 Oct 2023 05:15 )    Color: x / Appearance: x / SG: x / pH: x  Gluc: 25 mg/dL / Ketone: x  / Bili: x / Urobili: x   Blood: x / Protein: x / Nitrite: x   Leuk Esterase: x / RBC: x / WBC x   Sq Epi: x / Non Sq Epi: x / Bacteria: x            I & O Summary:      Microbiology:          RADIOLOGY & ADDITIONAL TESTS:    Imaging Personally Reviewed:  [ ] YES  [ ] NO  gabapentin 100 milliGRAM(s) Oral at bedtime  heparin   Injectable 5000 Unit(s) SubCutaneous every 12 hours  lidocaine   4% Patch 1 Patch Transdermal daily  multivitamin 1 Tablet(s) Oral daily  OLANZapine 2.5 milliGRAM(s) Oral at bedtime  OLANZapine Injectable 2.5 milliGRAM(s) IntraMuscular every 6 hours PRN  oxyCODONE    IR 10 milliGRAM(s) Oral every 4 hours PRN  pancrelipase  (CREON  6,000 Lipase Units) 1 Capsule(s) Oral three times a day with meals  polyethylene glycol 3350 17 Gram(s) Oral daily  senna 2 Tablet(s) Oral at bedtime  thiamine IVPB 500 milliGRAM(s) IV Intermittent three times a day      HEALTH ISSUES - PROBLEM Dx:  ESRD on hemodialysis    Bicytopenia    DVT (deep venous thrombosis)    Chronic systolic congestive heart failure    Need for prophylactic measure    Chronic pancreatitis    Cachexia    Psychosis

## 2023-10-11 NOTE — PROGRESS NOTE ADULT - PROBLEM SELECTOR PLAN 1
Pt with ESRD on HD TIW (MWF). Pt tolerating treatment well today. Plan for next HD treatment on 10/13/23. Pt clinically stable. Labs reviewed. Recommend liberalizing diet. Monitor BP and labs.      If you have any questions, please feel free to contact me.  Yovanny Aparicio  Nephrology Fellow  [phone] / [employer] Teams (Preferred)  (After 4pm or on weekends, please call the on-call Fellow).

## 2023-10-11 NOTE — PROGRESS NOTE ADULT - NS ATTEND AMEND GEN_ALL_CORE FT
agree with above  ASA if platelets consistantly > 70k  statin therpay with LDL goal < 70mg/dL   if on DOAC for DVT no need for ASA but seems to have been stopped   Ren Saenz MD  Vascular Neurology  Office: [phone]

## 2023-10-11 NOTE — PROGRESS NOTE ADULT - PROBLEM SELECTOR PLAN 6
-on apixaban, reportedly for chronic R brachial DVT per [employer] review (doppler from 1/26/23 noted acute DVT at that time, cannot find repeat doppler confirming chronicity in EMR)  -Doppler August 2023 with no DVT in RUE  -switched to SQ heparin -nutrition consult appreciated  -passed bedside swallow test 10/11 AM, resumed regular diet.

## 2023-10-11 NOTE — PROGRESS NOTE ADULT - PROBLEM SELECTOR PLAN 3
#macrocytic anemia, likely i/s/o chronic illness/nutritional deficiency  trend H/H -- stable    #thrombocytopenia   evaluated by heme on prior admission, thought to be multifactorial   chronic/stable  monitor/trend -C/w HD MWF  -appreciate Nephro recs  -states he produces minimal urine

## 2023-10-11 NOTE — PROVIDER CONTACT NOTE (CRITICAL VALUE NOTIFICATION) - ASSESSMENT
Pt at dialysis at time of critical lab value, Dialysis RN notified and aware
Patient alert and oriented x4
pt asymptomatic with no signs of distress noted

## 2023-10-11 NOTE — SWALLOW BEDSIDE ASSESSMENT ADULT - COMMENTS
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 Found to have psychosis/delusions significantly worse than prior hospitalizations despite mostly intact cognition, consulted Psychiatry for optimization of psychiatric meds."    SLP arrived to patient's unit/room this PM for clinical swallow evaluation however informed by RN that patient is off unit for ECHO. This department to follow up as schedule permits.
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 Found to have psychosis/delusions significantly worse than prior hospitalizations despite mostly intact cognition, consulted Psychiatry for optimization of psychiatric meds."    CXR 10/10: "FINDINGS: Right lower lobe airspace opacity is present consistent with aspiration although there are similar finding on the study of August 26, 2023. Likewise, left lower lobe opacity less distinct than that study. Upper lungs are clear, the heart is not enlarged and there is no effusions or pneumothorax."    Patient seen awake/alert during clinical swallow evaluation this PM. Patient denies difficulty swallowing and states "I can eat anything." Patient pointing to bedside table with bread, salmon, cake and states "I eat all of this."

## 2023-10-11 NOTE — PROGRESS NOTE ADULT - PROBLEM SELECTOR PLAN 1
- Psych c/s appreciated: pt with estella psychosis; cannot leave AMA but no current need for 1:1  - Olanzapine discontinued i/s/o prolonged QTc and current thrombocytopenia, and no significant clinical improvement  - Current Psych recs:  -- standing haloperidol 1mg PO qHS *continue to monitor QTc interval and HOLD for QTc >500 ms*  -- lorazepam 1mg q8h PRN PO/IM/IV for severe agitation per Psych  - CTH with subacute/chronic L frontal lobe cortical infarct (new compared to Aug 2023)  - Patient to remain on tele while admitted per Neuro  - CTH and CTA H/N ordered per Neuro  - Daily ECG for QTc evaluation (QTc on 10/9: 462 ms)  - Evaluating candidacy for Psych admission - Psych c/s appreciated: pt with estella psychosis; cannot leave AMA but no current need for 1:1  - Current Psych recs:  -- standing haloperidol 1mg PO qHS *continue to monitor QTc interval and HOLD for QTc >500 ms*  -- lorazepam 1mg q8h PRN PO/IM/IV for severe agitation per Psych  - Daily ECG for QTc evaluation (QTc on 10/9: 462 ms)  - Evaluating candidacy for Psych admission -- not Psych admission candidate at this time (HD)

## 2023-10-11 NOTE — PROGRESS NOTE ADULT - ASSESSMENT
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 Neurology consulted for head CT findings showing subacute/chronic infarct in frontal lobe. rCTH unchanged. CTA H/N w/o LVO or significant stenosis.     Impression: Patient presented with generalized weakness/pain. Undergoing psychiatric workup for acutely worsening psychosis. Incidentally found to have L MCA likely chronic infarct from embolic stroke of undetermined source.    Recommendations    [] Would consider extended cardiac monitoring with ILR if patient amenable. Otherwise can consider Holter monitoring. Should not hold discharge   [] TTE when able; not likely to change acute management   [] When no longer contraindicated from medical standpoint (thrombocytopenia), would restart ASA 81 mg   [] If patient to remain on indefinite anticoagulation with apixaban, then no need to be on concomitant ASA. However, if anticoagulation to be discontinued in the future can start back on ASA at that time for secondary stroke prevention.  [] Atorvastatin 40 mg pending Lipid Panel  [] DVT prophyaxis   [] Lipid Panel, HgA1C  [] Neurochecks qshift  [] BP goals: Normotension  [] HgA1C goals < 7.0  [] Lifestyle modifications: smoking cessation, exercise, and a Mediterranean style diet.   [] Upon discharge, patient should follow up with Dr. Saenz:  [address] [facility] [place] [address]. Call [phone].     Patient case discussed with stroke attending, Dr. Saenz. Patient to be seen on morning rounds.     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 Neurology consulted for head CT findings showing subacute/chronic infarct in frontal lobe. rCTH unchanged. CTA H/N w/o LVO or significant stenosis.     Impression: Patient presented with generalized weakness/pain. Undergoing psychiatric workup for acutely worsening psychosis. Incidentally found to have clinically silent, likely chronic L MCA infarct from embolic stroke of undetermined source.    Recommendations    [] Would consider extended cardiac monitoring with ILR if patient amenable. Otherwise can consider Holter monitoring. Should not hold discharge   [] TTE when able; not likely to change acute management given on AC.   [] When no longer contraindicated from medical standpoint (thrombocytopenia), would restart ASA 81 mg   [] If patient to remain on indefinite anticoagulation with apixaban, then no need to be on concomitant ASA. However, if anticoagulation to be discontinued in the future can start back on ASA at that time for secondary stroke prevention.  [] Atorvastatin 40 mg pending Lipid Panel  [] DVT prophyaxis   [] Lipid Panel, HgA1C  [] Neurochecks qshift  [] BP goals: Normotension  [] HgA1C goals < 7.0  [] Lifestyle modifications: smoking cessation, exercise, and a Mediterranean style diet.   [] Upon discharge, patient should follow up with Dr. Saenz:  [address] [facility] [place] [address]. Call [phone].     Patient case discussed with stroke attending, Dr. Saenz. Patient to be seen on morning rounds.     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 Neurology consulted for head CT findings showing subacute/chronic infarct in frontal lobe. rCTH unchanged. CTA H/N w/o LVO or significant stenosis.   LDL 28    Impression: Patient presented with generalized weakness/pain. Undergoing psychiatric workup for acutely worsening psychosis. Incidentally found to have clinically silent, likely chronic L MCA infarct from embolic stroke of undetermined source.    Recommendations    [] Would consider extended cardiac monitoring with ILR if patient amenable and only if would be able to tolerate AC in future. Otherwise can consider Holter monitoring. Should not hold discharge   [] TTE when able   [] When no longer contraindicated from medical standpoint (thrombocytopenia), would restart ASA 81 mg ; would restart if ASA consistantly > 70k ;   [] asa not needed if on AC for DVT but seems to have stopped DOAC after 8 months   [] If patient to remain on indefinite anticoagulation with apixaban, then no need to be on concomitant ASA. However, if anticoagulation to be discontinued in the future can start back on ASA at that time for secondary stroke prevention.  [] Atorvastatin 40 mg pending Lipid Panel  [] DVT prophyaxis   [] Lipid Panel, HgA1C  [] Neurochecks qshift  [] BP goals: Normotension  [] HgA1C goals < 7.0  [] Lifestyle modifications: smoking cessation, exercise, and a Mediterranean style diet.   [] Upon discharge, patient should follow up with Dr. Saenz:  [address] [facility] [address] [place] [address]. Call [phone].     Patient case discussed with stroke attending, Dr. Saenz. Patient to be seen on morning rounds.     Please call with questions: [phone]

## 2023-10-11 NOTE — PROVIDER CONTACT NOTE (HYPOGLYCEMIA EVENT) - NS PROVIDER CONTACT BACKGROUND-HYPO
Age: 63y    Gender: Male    POCT Blood Glucose:  117 mg/dL (10-11-23 @ 07:11)  43 mg/dL (10-11-23 @ 06:41)  41 mg/dL (10-11-23 @ 06:38)      eMAR:dextrose 50% Injectable   50 milliLiter(s) IV Push (10-11-23 @ 06:47)    
Age: 63y    Gender: Male    POCT Blood Glucose:  119 mg/dL (10-11-23 @ 11:13)  295 mg/dL (10-11-23 @ 10:38)  67 mg/dL (10-11-23 @ 09:44)  117 mg/dL (10-11-23 @ 07:11)  43 mg/dL (10-11-23 @ 06:41)  41 mg/dL (10-11-23 @ 06:38)      eMAR:  dextrose 50% Injectable   12.5 milliLiter(s) IV Push (10-11-23 @ 10:24)    dextrose 50% Injectable   50 milliLiter(s) IV Push (10-11-23 @ 06:47)

## 2023-10-12 NOTE — SWALLOW VFSS/MBS ASSESSMENT ADULT - ADDITIONAL RECOMMENDATIONS
1. Monitor for any changes in neurological status that may impact oral intake. 2. Reconsult if change in medical status. 3. This service to follow up as schedule permits.

## 2023-10-12 NOTE — CONSULT NOTE ADULT - SUBJECTIVE AND OBJECTIVE BOX
Source: patient and Chart    Patient is a 63y old  Male who presents with a chief complaint of chronic pancreatitis pain (12 Oct 2023 06:51)      HPI:  This is a 63 year-old man with a pmhx of alcohol abuse, ESRD on HD TIW (MWF), chronic pancreatitis, HCV (s/p tx with SVR), COPD, esophageal stricture (s/p stenting  with subsequent removal 2022), DVT (on apixaban), ESRD (on HD), history of a lung abscess (Pseudomonas/Morgenella), HTN, chronic systolic CHF, chronic pain (on chronic oxy/acetaminophen), history of emphysematous pancreatitis 2023, recent hospitalization 2023 for AMS/extreme hypoglycemia thought to be possible opioid overdose requiring intubation, course complicated by aspiration pneumonia who presented to [facility] due to "lack of medication" which caused him to get abdominal pain and a "disruption of his neurological system"  according to the patient.     Patient stated that he has epigastric pain and radiates around diffuse abdomen as well as to the back at times however it was controlled with oxycodone. Patient was unable to rate the intensity of his pain prior to coming to the ED.  Patient admitted to fevers and chills 3 days prior to his admission. He also stated that he has hematuria, increased urinary frequency when he does not have his oxycodone, and had a bitemporal headache. According to the H&P, the headache he stated that he feels like somebody is causing the headache and continued to mention psychokinesis. H&P also reported that he has occasional involuntary movements and stated that it is related to a person that "calls himself the HDS, the homo downstairs." Patient denied CP, palpitation, orthopnea, SOB, n/v/d, melena, hematochezia,  numbness, tingling, slurred speech, limb weakness, loss of strength, and tremors.    His ED course was significant for T 98.7F, [id number], -155/77-93mmHg, RR 14-16/min with [id number]%RA. Patient received NS 500cc IVF, acetaminophen 1g IVPB x1, famotidine 20mg IV x1, hydromorphone 1mg IV x2, and morphine 4mg IV x1 in the ED. CT head showed a subacute/chronic L frontal lobe cortical infarct. CTA head/neck with no large vessel occlusion or stenosis. Nephrology was consulted fro HD management. Pain management was consulted and recommended gabapentin at bedtime, oxycodone 10mg q4h prn, and lidocaine path. Psych consulted for psychosis. Patient told [facility] that he feels like "someone else is swallowing for me" when he eats, someone is putting ideas in his head "through silence", and he has referential thoughts from the TV that Zeyad Lubin and Sam Dietz are simultaneously his uncle and God, and that he himself is God as well. Psych recommended Olanzapine  and thiamine. Psych d/c olanzapine d/t QTc prolongation and started haloperidol and lorazepam. Neurology was consulted for stroke and recommended ILR and TTE which showed an EF of 20-25%, mild LV diasystolic function moderate MR, moderate AR and no intracardiac shunt. EP was consulted for ILR evaluation for prolonged monitoring for detection of AF/PAF as cause of potential cardioembolic source. Patient stated that he seen a cardiologist in the past and his heart is "fine". He denied a hx of cardiac arrhythmias. The risks and benefits were explained in detail which included but not limited to bleeding, infection, stroke, syncope 2/2 vasovagal, intubation, and death. Patient expressed understanding and all questions were answered. Patient stated that he does not want an ILR as he does not "trust" it. Patient also stated that he does not believe he has a stroke because he is functioning normally.       PAST MEDICAL & SURGICAL HISTORY:  ETOH abuse  sober x1 year approximately      Hepatitis C  treated      Gout      HTN (hypertension)      H/O chronic pancreatitis      Wound of right foot      ESRD on hemodialysis      DVT (deep venous thrombosis)      Gastric perforation      AVF (arteriovenous fistula)            MEDICATIONS  (STANDING):  atorvastatin 40 milliGRAM(s) Oral at bedtime  chlorhexidine 2% Cloths 1 Application(s) Topical daily  dextrose 5%. 1000 milliLiter(s) (100 mL/Hr) IV Continuous <Continuous>  dextrose 5%. 1000 milliLiter(s) (50 mL/Hr) IV Continuous <Continuous>  dextrose 50% Injectable 25 Gram(s) IV Push once  dextrose 50% Injectable 12.5 Gram(s) IV Push once  dextrose 50% Injectable 25 Gram(s) IV Push once  gabapentin 100 milliGRAM(s) Oral at bedtime  glucagon  Injectable 1 milliGRAM(s) IntraMuscular once  haloperidol     Tablet 1 milliGRAM(s) Oral at bedtime  heparin   Injectable 5000 Unit(s) SubCutaneous every 12 hours  metoprolol succinate ER 25 milliGRAM(s) Oral daily  multivitamin 1 Tablet(s) Oral daily  mupirocin 2% Ointment 1 Application(s) Both Nostrils two times a day  pancrelipase  (CREON  6,000 Lipase Units) 1 Capsule(s) Oral three times a day with meals  polyethylene glycol 3350 17 Gram(s) Oral daily  senna 2 Tablet(s) Oral at bedtime    MEDICATIONS  (PRN):  dextrose Oral Gel 15 Gram(s) Oral once PRN Blood Glucose LESS THAN 70 milliGRAM(s)/deciliter  LORazepam   Injectable 1 milliGRAM(s) IV Push every 8 hours PRN Agitation  LORazepam   Injectable 1 milliGRAM(s) IV Push once PRN Anxiety  oxyCODONE    IR 10 milliGRAM(s) Oral every 4 hours PRN Mod-severe pain      FAMILY HISTORY:  Patient states he does not know his parent or sibling medical hx    SOCIAL HISTORY:  CIGARETTES: Denied  ALCOHOL: stated he used to drink but not now  ILLICIT DRUG USES: denied    REVIEW OF SYSTEMS:  CONSTITUTIONAL: No fever, weight loss, chills, shakes, or fatigue  EYES: No eye pain, visual disturbances, or discharge  ENMT:  No difficulty hearing, tinnitus, vertigo; No sinus or throat pain  NECK: No pain or stiffness  RESPIRATORY: No cough, wheezing, hemoptysis, or shortness of breath  CARDIOVASCULAR: per HPI  GASTROINTESTINAL:per HPI  GENITOURINARY: No dysuria, nocturia, hematuria, or urinary incontinence  NEUROLOGICAL: per HPI  MUSCULOSKELETAL: generalized pain all over which he stated was d/t lack of medication   PSYCHIATRIC: No depression, anxiety, or difficulty sleeping        Vital Signs Last 24 Hrs  T(C): 36.9 (12 Oct 2023 09:35), Max: 37.1 (11 Oct 2023 22:00)  T(F): 98.4 (12 Oct 2023 09:35), Max: 98.8 (11 Oct 2023 22:00)  HR: 90 (12 Oct 2023 09:35) (90 - 93)  BP: 120/73 (12 Oct 2023 09:35) (115/60 - 127/68)  BP(mean): --  RR: 18 (12 Oct 2023 09:35) (18 - 18)  SpO2: 100% (12 Oct 2023 09:35) (99% - 100%)    Parameters below as of 12 Oct 2023 09:35  Patient On (Oxygen Delivery Method): room air        PHYSICAL EXAM:  GENERAL: Well appearing, speaking in full sentence, in NAD  HEART: S1S2 RRR  PULMONARY:CTABL, normal respiratory effort  ABDOMEN: Bowel sounds present, soft  EXTREMITIES:  Warm, well -perfused, no pedal edema, distal pulses present  NEUROLOGICAL:AOx3 person, place and time    INTERPRETATION OF TELEMETRY: SR HR  with PVC    ECG:ST with LVH  QTC calculated is 467 on 10/11/2023    I&O's Detail    11 Oct 2023 07:01  -  12 Oct 2023 07:00  --------------------------------------------------------  IN:    Other (mL): 500 mL  Total IN: 500 mL    OUT:    Other (mL): 1179 mL  Total OUT: 1179 mL    Total NET: -679 mL          LABS:                        9.5    6.03  )-----------( 84       ( 12 Oct 2023 05:00 )             28.8     1012    144  |  105  |  35<H>  ----------------------------<  115<H>  4.3   |  26  |  3.72<H>    Ca    8.2<L>      12 Oct 2023 05:00  Phos  3.5     10-12  Mg     1.90     1012    TPro  4.9<L>  /  Alb  2.5<L>  /  TBili  0.3  /  DBili  x   /  AST  34  /  ALT  57<H>  /  AlkPhos  227<H>  1012          Urinalysis Basic - ( 12 Oct 2023 05:00 )    Color: x / Appearance: x / SG: x / pH: x  Gluc: 115 mg/dL / Ketone: x  / Bili: x / Urobili: x   Blood: x / Protein: x / Nitrite: x   Leuk Esterase: x / RBC: x / WBC x   Sq Epi: x / Non Sq Epi: x / Bacteria: x      BNP  I&O's Detail    11 Oct 2023 07:01  -  12 Oct 2023 07:00  --------------------------------------------------------  IN:    Other (mL): 500 mL  Total IN: 500 mL    OUT:    Other (mL): 1179 mL  Total OUT: 1179 mL    Total NET: -679 mL    Daily     Daily Weight in k.9 (12 Oct 2023 04:40)    RADIOLOGY & ADDITIONAL STUDIES:    < from: Xray Chest 1 View- PORTABLE-Urgent (Xray Chest 1 View- PORTABLE-Urgent .) (10.05.23 @ 07:12) >  IMPRESSION:  Nondiagnostic chest x-ray.    < end of copied text >    < from: CT Abdomen and Pelvis w/ IV Cont (10.05.23 @ 09:57) >  IMPRESSION:  Sequela of chronic pancreatitis with mild common bile duct dilatation   unchanged. The prior peripancreatic foci of air have resolved.    No bowel obstruction or evidence of mesenteric ischemia.    No evidence of mesenteric artery, portal vein and SMA SMV thrombus    Ascites and subcutaneous edema.    Renal atrophy. No hydronephrosis. Please read above.    Cachexia    < end of copied text >    < from: CT Head No Cont (10.06.23 @ 21:08) >  IMPRESSION: Subacute/chronic left frontal lobe cortical infarct. Consider   MRI for further evaluation.    < end of copied text >    < from: CT Angio Neck w/ IV Cont (10.11.23 @ 14:10) >  IMPRESSION:    Head CT: Stable follow-up CT exam.    CTA neck and head: No large vessel occlusion or major stenosis.    Patchy right upper lobe opacity for which pneumonia is a possibility.   Recommend imaging follow-up to resolution.    --- End of Report ---    < end of copied text >    < from: TTE W or WO Ultrasound Enhancing Agent (10.11.23 @ 16:31) >  CONCLUSIONS:      1. Left ventricular cavity is normally sized. Left ventricular wall thickness is normal. Left ventricular systolic function is severely decreased with an ejection fraction visually estimated at 20 to 25 %. Global left ventricular hypokinesis.   2. There is no evidence of a left ventricular thrombus.   3. There is mild (grade 1) left ventricular diastolic dysfunction.   4. Normal right ventricular cavity size and systolic function.   5. There is calcification of the mitral valve annulus. There is moderate mitral regurgitation.   6. The aortic valve appears trileaflet with normal systolic excursion. There is calcification of the aortic valve leaflets. There is no aortic valve stenosis. There is moderate aortic regurgitation.   7. Agitated saline injection was negative for intracardiac shunt.   8. No prior echocardiogram is available for comparison.    ________________________________________________________________________________________  FINDINGS:    < end of copied text >

## 2023-10-12 NOTE — SWALLOW VFSS/MBS ASSESSMENT ADULT - DIAGNOSTIC IMPRESSIONS
1. Mild-moderate oral dysphagia for puree, regular solids, mildly thick liquids and thin liquids marked by adequate oral acceptance, mildly slow chewing for regular solids and adequate tongue motion for anterior to posterior transport. There is premature spillage to the hypopharynx due to reduced tongue to palate seal for mildly thick liquids and thin liquids. Adequate oral clearance post primary swallow.  2. Mild pharyngeal dysphagia for puree, regular solids, mildly thick liquids, and thin liquids marked by a delayed pharyngeal swallow trigger (bolus head at the pyriforms for thin liquids) adequate base of tongue retraction, adequate hyolaryngeal elevation, adequate epiglottic deflection, adequate pharyngeal contractility, and reduced duration/opening of the pharyngeal-esophageal segment. There is trace-mild pharyngeal clearance deficits located primarily in the pyriforms post primary swallow. There is one episode of Trace Laryngeal Penetration during the swallow above the level of the vocal folds with retrieval and adequate airway protection maintained for large self-administered cup sip of thin liquids. Laryngeal Penetration did not reduplicate when given further trials of thin liquids.  No Aspiration, before, during or after the swallow for puree, regular solids and/or mildly thick liquids.

## 2023-10-12 NOTE — PROGRESS NOTE ADULT - PROBLEM SELECTOR PLAN 7
-previously on apixaban, reportedly for chronic R brachial DVT per [employer] review (doppler from 1/26/23 noted acute DVT at that time, cannot find repeat doppler confirming chronicity in EMR)  -Doppler August 2023 with no DVT in RUE  -switched to SQ heparin -nutrition consult appreciated  -passed bedside swallow test 10/11 AM, resumed regular diet.  -S/p SLP, recs regular solids and thin liquids  -order cine esophagram

## 2023-10-12 NOTE — SWALLOW VFSS/MBS ASSESSMENT ADULT - RECOMMENDED CONSISTENCY
1) Regular solids with Thin liquids  2) Feeding/Swallowing Guidelines: Upright positioning, Encourage small single sips of thin liquids  3) Aspiration/reflux precautions

## 2023-10-12 NOTE — BH CONSULTATION LIAISON PROGRESS NOTE - NSBHFUPINTERVALHXFT_PSY_A_CORE
Chart reviewed. Vital signs stable. Patient refused to take all bedtime meds including gabapentin, haloperidol, and senna at 10 PM on 10/11. Patient has not required lorazepam PRN since 10/10. Patient seen and examined at bedside. Patient was eating a hard-boiled egg and swallowing without difficulty or spitting up. Patient denies any nausea or vomiting. Patient states his pain is improving. Patient reports his mood as “okay” and endorses sleeping and eating well. Patient reports feeling frustrated that he was offered haloperidol because “they give people Haldol and it changes them.” Patient states that “if you bring up Haldol again, I’ll kill you.” Patient was reassured that the team only wants to ensure his safety. Patient endorses continued delusions of knowledge “transference” from others and reports he had to “double up to get here.” In response to additional questioning regarding the patient’s thought content, patient states “don’t ask if you don’t know.” Patient currently denies any SIIP or HIIP. At the conclusion of the evaluation, patient states “you don’t need to keep coming to see me.” Chart reviewed. Vital signs stable. Patient refused to take all bedtime meds including gabapentin, haloperidol, and senna at 10 PM on 10/11. Patient has not required lorazepam PRN since 10/10. Patient seen and examined at bedside. Patient was eating a hard-boiled egg and swallowing without difficulty or spitting up. Patient denies any nausea or vomiting. Patient states his pain is improving. Patient reports his mood as “okay” and endorses sleeping and eating well. Patient reports feeling frustrated that he was offered haloperidol because “they give people Haldol and it changes them.” Patient emphasized angrily that he did not want Haldol. Patient was reassured that the team only wants to ensure his safety. Patient endorses continued delusions of knowledge “transference” from others and reports he had to “double up to get here.” In response to additional questioning regarding the patient’s thought content, patient states “don’t ask if you don’t know.” Patient currently denies any SIIP or HIIP. At the conclusion of the evaluation, patient states “you don’t need to keep coming to see me.”

## 2023-10-12 NOTE — CONSULT NOTE ADULT - ASSESSMENT
This is a 63 year-old man with a pmhx of alcohol abuse, ESRD on HD TIW (MWF), chronic pancreatitis, HCV (s/p tx with SVR), COPD, esophageal stricture (s/p stenting 4/22 with subsequent removal 12/16/2022), DVT (on apixaban), ESRD (on HD), history of a lung abscess (Pseudomonas/Morgenella), HTN, chronic systolic CHF, chronic pain (on chronic oxy/acetaminophen), history of emphysematous pancreatitis 7/2023, recent hospitalization 8/2023 for AMS/extreme hypoglycemia thought to be possible opioid overdose requiring intubation, course complicated by aspiration pneumonia who presented to [facility] due to "lack of medication" which caused him to get abdominal pain and a "disruption of his neurological system"  according to the patient. Patient is being followed by PSYCH. His course was notabled for subacute/chronic L frontal lobe cortical infarct on CT for which neurology is following. EP was consulted for ILR evaluation for prolonged monitoring for detection of AF/PAF as cause of potential cardioembolic source. Patient stated that he seen a cardiologist in the past and his heart is "fine". He denied a hx of cardiac arrhythmias. The risks and benefits were explained in detail which included but not limited to bleeding, infection, stroke, syncope 2/2 vasovagal, intubation, and death. Patient expressed understanding and all questions were answered. Patient stated that he does not want an ILR as he does not "trust" it. Patient also stated that he does not believe he has a stroke because he is functioning normally.   Plan:  - Continuous telemetric monitoring  - Monitor electrolytes and replete K to 4 and Mg to 2  - TTE which showed an EF of 20-25%, mild LV diasystolic function moderate MR, moderate AR and no intracardiac shunt. Report as above  - Appreciate psych, nephrology and neurology recs  - GDMT per primary team   - Continue care per primary team    Shruti Hoffman PA-C  Patient to be staffed with attending. Please await attending addendum This is a 63 year-old man with a pmhx of alcohol abuse, ESRD on HD TIW (MWF), chronic pancreatitis, HCV (s/p tx with SVR), COPD, esophageal stricture (s/p stenting 4/22 with subsequent removal 12/16/2022), DVT (on apixaban), ESRD (on HD), history of a lung abscess (Pseudomonas/Morgenella), HTN, chronic systolic CHF, chronic pain (on chronic oxy/acetaminophen), history of emphysematous pancreatitis 7/2023, recent hospitalization 8/2023 for AMS/extreme hypoglycemia thought to be possible opioid overdose requiring intubation, course complicated by aspiration pneumonia who presented to [facility] due to "lack of medication" which caused him to get abdominal pain and a "disruption of his neurological system"  according to the patient. Patient is being followed by PSYCH. His course was notabled for subacute/chronic L frontal lobe cortical infarct on CT for which neurology is following. EP was consulted for ILR evaluation for prolonged monitoring for detection of AF/PAF as cause of potential cardioembolic source. Patient stated that he seen a cardiologist in the past and his heart is "fine". He denied a hx of cardiac arrhythmias. The risks and benefits were explained in detail which included but not limited to bleeding, infection, stroke, syncope 2/2 vasovagal, intubation, and death. Patient expressed understanding and all questions were answered. Patient stated that he does not want an ILR as he does not "trust" it. Patient also stated that he does not believe he has a stroke because he is functioning normally. Ep will sign off.    Plan:  - Continuous telemetric monitoring  - Monitor electrolytes and replete K to 4 and Mg to 2  - TTE which showed an EF of 20-25%, mild LV diasystolic function moderate MR, moderate AR and no intracardiac shunt. Report as above  - Appreciate psych, nephrology and neurology recs  - GDMT per primary team   - Continue care per primary team    Shruti Hoffman PA-C  Patient to be staffed with attending. Please await attending addendum

## 2023-10-12 NOTE — PROGRESS NOTE ADULT - ATTENDING COMMENTS
63M w/ ESRD on HD, chronic systolic HF (EF 20-25%), alcohol use, h/o HCV (most recent VL negative), chronic pancreatitis (taking chronic opioids), COPD, h/o esophageal stricture (stented 5/22), h/o UE DVT (1/2023, no longer on a/c), recent hospitalization for possible opioid overdose requiring intubation p/w acute on chronic abdominal pain in setting of running out of pain medications at home and PCP reluctant to prescribe more given recent possible OD. Found to have new/worsening psychosis – psychiatry consulted and as part of this workup received CTH, which showed L frontal lobe cortical subacute/chronic infarct    - Team obtained collateral from patient's brother - psychosis has been ongoing for past ~5 years, but has been worse lately.   - Has been spitting up large amount of saliva, concern for dysphagia especially given history of esophageal stricture  - Discussed CT findings and neurology recommendation for ILR. He was able to verbalize his understanding of the current situation and agrees to EP consult for ILR placement  - ECHO returned with much lower EF than prior - 20-25%.    - Psychosis - unclear etiology (no known prior psych diagnoses); appreciate psychiatry involvement; s/p thiamine 500mg IV TID x3 days (10/6-10/9); continue haldol (note patient declined last night). D/w psychiatry capacity for consenting to ILR  - Subacute/chronic L frontal lobe cortical infarct - appreciate neurology involvement; pending repeat CTH; daily CBC and start ASA if plt > 70. EP consult for ILR  - Systolic heart failure - TTE w/ EF 20-25% ddx includes ischemic CMP and toxic 2/2 EtOH - will require outpatient ischemic eval. Start metoprolol today, if BP room add losartan tomorrow  - Thrombocytopenia - on and off since 2020; hematology evaluated in 9/2023 and felt multifactorial  - Chronic pancreatitis - pain service consulted (now signed off) - pain currently well controlled on current regimen - recommend consideration of slow taper vs consider transition to suboxone as outpatient  - ESRD on HD - renal consulted for HD 63M w/ ESRD on HD, chronic systolic HF (EF 20-25%), alcohol use, h/o HCV (most recent VL negative), chronic pancreatitis (taking chronic opioids), COPD, h/o esophageal stricture (stented 5/22), h/o UE DVT (1/2023, no longer on a/c), recent hospitalization for possible opioid overdose requiring intubation p/w acute on chronic abdominal pain in setting of running out of pain medications at home and PCP reluctant to prescribe more given recent possible OD. Found to have new/worsening psychosis – psychiatry consulted and as part of this workup received CTH, which showed L frontal lobe cortical subacute/chronic infarct    - Team obtained collateral from patient's brother - psychosis has been ongoing for past ~5 years, but has been worse lately.   - Has been spitting up large amount of saliva, concern for dysphagia especially given history of esophageal stricture  - Discussed CT findings and neurology recommendation for ILR. He was able to verbalize his understanding of the current situation and agrees to EP consult for ILR placement  - ECHO returned with much lower EF than prior - 20-25%.    - Psychosis - unclear etiology (no known prior psych diagnoses); appreciate psychiatry involvement; s/p thiamine 500mg IV TID x3 days (10/6-10/9); continue haldol (note patient declined last night). D/w psychiatry capacity for consenting to ILR  - Subacute/chronic L frontal lobe cortical infarct - appreciate neurology involvement; pending repeat CTH; daily CBC and start ASA if plt > 70. EP consult for ILR  - Systolic heart failure - TTE w/ EF 20-25% ddx includes ischemic CMP and toxic 2/2 EtOH - will require outpatient ischemic eval. Start metoprolol today, if BP room add losartan tomorrow  - Hypoglycemia yesterday most likely due to pancreatic endocrine insufficiency in setting of chronic pancreatitis  - Thrombocytopenia - on and off since 2020; hematology evaluated in 9/2023 and felt multifactorial  - Chronic pancreatitis - pain service consulted (now signed off) - pain currently well controlled on current regimen - recommend consideration of slow taper vs consider transition to suboxone as outpatient  - ESRD on HD - renal consulted for HD

## 2023-10-12 NOTE — PROGRESS NOTE ADULT - PROBLEM SELECTOR PLAN 1
- Psych c/s appreciated: pt with estella psychosis; cannot leave AMA but no current need for 1:1  - Current Psych recs:  -- standing haloperidol 1mg PO qHS *continue to monitor QTc interval and HOLD for QTc >500 ms*  -- lorazepam 1mg q8h PRN PO/IM/IV for severe agitation per Psych  - Daily ECG for QTc evaluation (QTc on 10/11: 490 ms)  - Evaluating candidacy for Psych admission -- not Psych admission candidate at this time (HD) - Psych c/s appreciated: pt with estella psychosis; cannot leave AMA but no current need for 1:1  - Current Psych recs:  -- standing haloperidol 1mg PO qHS *continue to monitor QTc interval and HOLD for QTc >500 ms*  -- lorazepam 1mg q8h PRN PO/IM/IV for severe agitation per Psych  - Daily ECG for QTc evaluation (QTc on 10/11: 490 ms)  - Not a candidate for Psychiatry admission while on HD

## 2023-10-12 NOTE — PROGRESS NOTE ADULT - PROBLEM SELECTOR PLAN 2
- CTH with subacute/chronic L frontal lobe cortical infarct (new compared to Aug 2023)  - Patient to remain on tele while admitted per Neuro  - Pending CTH  - S/p CTA H/N 10/11 with no new progression; subacute-chronic L frontal lobe cortical infarct, no large vessel occlusion or major stenosis.  - Pending TTE with bubble study  - Neurology following -- appreciate recs - CTH with subacute/chronic L frontal lobe cortical infarct (new compared to Aug 2023)  - Patient to remain on tele while admitted per Neuro  - Pending CTH  - S/p CTA H/N 10/11 with no new progression; subacute-chronic L frontal lobe cortical infarct, no large vessel occlusion or major stenosis.  - TTE w/ bubble study 10/11 negative for intracardiac shunt  - Neurology following -- appreciate recs  - HbA1c 4.1% (10/12)  - Start atorvastatin 40 mg -- Lipid panel WNL  - Plan to start ASA81 if plt >70k tomorrow 10/13  - Pt to follow up with Dr. Saenz after discharge:  [address] [place] [address]. Call [phone].

## 2023-10-12 NOTE — PROGRESS NOTE ADULT - PROBLEM SELECTOR PLAN 8
-BNP >94062, troponin 131  -clinically euvolemic and no CP, low concern for ACS; elevation possibly iso poor renal clearance  -monitor I/Os, daily weights  - TTE w/ bubble study (10/11/2023):   1. Left ventricular cavity is normally sized. Left ventricular wall thickness is normal. Left ventricular systolic function is severely decreased with an ejection fraction visually estimated at 20 to 25 %. Global left ventricular hypokinesis.   2. There is no evidence of a left ventricular thrombus.   3. There is mild (grade 1) left ventricular diastolic dysfunction.   4. Normal right ventricular cavity size and systolic function.   5. There is calcification of the mitral valve annulus. There is moderate mitral regurgitation.   6. The aortic valve appears trileaflet with normal systolic excursion. There is calcification of the aortic valve leaflets. There is no aortic valve stenosis. There is moderate aortic regurgitation.   7. Agitated saline injection was negative for intracardiac shunt.   8. No prior echocardiogram is available for comparison. -previously on apixaban, reportedly for chronic R brachial DVT per [employer] review (doppler from 1/26/23 noted acute DVT at that time, cannot find repeat doppler confirming chronicity in EMR)  -Doppler August 2023 with no DVT in RUE  -currently on SQ heparin

## 2023-10-12 NOTE — PROGRESS NOTE ADULT - ASSESSMENT
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 Found to have psychosis/delusions significantly worse than prior hospitalizations despite appearing to have intact cognition, consulted Psychiatry for optimization of psychiatric meds. Found to have new L frontal lobe cortical infarct, Neurology following.

## 2023-10-12 NOTE — BH CONSULTATION LIAISON PROGRESS NOTE - OTHER
Appears older than actual age Patient describes mood as "okay" and became angry during discussion of haloperidol.

## 2023-10-12 NOTE — PROGRESS NOTE ADULT - PROBLEM SELECTOR PLAN 3
-C/w HD MWF  -appreciate Nephro recs  -states he produces minimal urine -BNP >05770, troponin 131  -Ddx includes ischemic CM, DCM 2/2 EtOH  -Clinically euvolemic  -monitor I/Os, daily weights  -S/p TTE w/ bubble study (10/11) negative for intracardiac shunt, LVEF 20-25% with moderate AI and moderate MR. No LV or RV enlargement.  -Consult EP for evaluation for extended cardiac monitoring with ILR  -Contact outpatient Cardiologist (Dr. Brian Harris) for follow-up plans for ischemic w/u and GDMT optimization  -Start metop succ 25 mg QD, with plan to start Losartan tomorrow (10/13) if BP tolerates

## 2023-10-12 NOTE — PROGRESS NOTE ADULT - SUBJECTIVE AND OBJECTIVE BOX
ECG is abnormal but similar to prior tracings available in his medical record.   LAURENTEDWINSTEFFANYIMELDA  63y  Male      Patient is a 63y old  Male who presents with a chief complaint of chronic pancreatitis pain (08 Oct 2023 07:03)      INTERVAL HPI/OVERNIGHT EVENTS:  ***S/p TTE w/ bubble study (10/11):   1. Left ventricular cavity is normally sized. Left ventricular wall thickness is normal. Left ventricular systolic function is severely decreased with an ejection fraction visually estimated at 20 to 25 %. Global left ventricular hypokinesis.   2. There is no evidence of a left ventricular thrombus.   3. There is mild (grade 1) left ventricular diastolic dysfunction.   4. Normal right ventricular cavity size and systolic function.   5. There is calcification of the mitral valve annulus. There is moderate mitral regurgitation.   6. The aortic valve appears trileaflet with normal systolic excursion. There is calcification of the aortic valve leaflets. There is no aortic valve stenosis. There is moderate aortic regurgitation.   7. Agitated saline injection was negative for intracardiac shunt.   8. No prior echocardiogram is available for comparison.    - Afebrile, tachy 100s, on RA, SpO2 >98%  - Tele: sinus tachy with occasional PVCs  - Perseveration on food and delusional thoughts "I created Superman and the people who make Superman" or "I cough because 'he' is playing at me and makes my throat itch"  - Denies lightheadedness, fever, chills, headache, chest pain, dyspnea, nausea, emesis.      REVIEW OF SYSTEMS:  CONSTITUTIONAL: No fever, weight loss, or fatigue  ENMT: No sinus or throat pain  NECK: No pain or stiffness  RESPIRATORY: No cough, wheezing, chills or hemoptysis; No shortness of breath  CARDIOVASCULAR: No chest pain, palpitations, dizziness, or leg swelling  GASTROINTESTINAL: No abdominal or epigastric pain. No nausea, vomiting, or hematemesis; No diarrhea or constipation. No melena or hematochezia.  GENITOURINARY: No dysuria, frequency, hematuria, or incontinence  NEUROLOGICAL: No headaches,, loss of strength, numbness, or tremors  SKIN: No itching, burning, rashes, or lesions   MUSCULOSKELETAL: No joint pain or swelling; No muscle, back, or extremity pain    FAMILY HISTORY:  FH: HTN (hypertension)    Vital Signs Last 24 Hrs  T(C): 37.1 (12 Oct 2023 04:40), Max: 37.1 (11 Oct 2023 22:00)  T(F): 98.8 (12 Oct 2023 04:40), Max: 98.8 (11 Oct 2023 22:00)  HR: 93 (12 Oct 2023 04:40) (90 - 93)  BP: 127/68 (12 Oct 2023 04:40) (115/60 - 134/80)  BP(mean): --  RR: 18 (12 Oct 2023 04:40) (17 - 18)  SpO2: 100% (12 Oct 2023 04:40) (99% - 100%)    Parameters below as of 12 Oct 2023 04:40  Patient On (Oxygen Delivery Method): room air        Tylenol (Other)      PHYSICAL EXAM:  GENERAL: cachectic, chronically ill-appearing, conversable  HEAD:  Atraumatic, Normocephalic  EYES: EOMI, PERRLA, conjunctiva and sclera clear, teary eyes  ENMT: Moist mucous membranes, Good dentition, No lesions  NECK: Supple, No JVD, Normal thyroid  CHEST/LUNG: Clear to auscultation bilaterally; No rales, rhonchi, wheezing, or rubs  HEART: Regular rate and rhythm; No murmurs, rubs, or gallops  ABDOMEN: Soft, Nontender, Nondistended; Bowel sounds present  EXTREMITIES:  2+ Peripheral Pulses, No clubbing, cyanosis, or edema  LYMPH: No lymphadenopathy noted  SKIN: No rashes or lesions  NERVOUS SYSTEM:  Alert & Oriented X3, Good concentration; Motor Strength 5/5 B/L upper and lower extremities;   PSYCH: Occasionally makes illogical comments marked with psychosis/delusions    Consultant(s) Notes Reviewed:  [x ] YES  [ ] NO  Care Discussed with Consultants/Other Providers [ x] YES  [ ] NO      LABS:                        12.4   6.70  )-----------( 86       ( 11 Oct 2023 05:15 )             36.8     10-11    146<H>  |  107  |  47<H>  ----------------------------<  25<LL>  3.5   |  22  |  4.97<H>    Ca    7.9<L>      11 Oct 2023 05:15  Phos  3.7     10-11  Mg     1.90     10-11    TPro  5.4<L>  /  Alb  2.8<L>  /  TBili  0.6  /  DBili  x   /  AST  25  /  ALT  63<H>  /  AlkPhos  268<H>  10-11        CAPILLARY BLOOD GLUCOSE      POCT Blood Glucose.: 119 mg/dL (11 Oct 2023 11:13)          Urinalysis Basic - ( 11 Oct 2023 05:15 )    Color: x / Appearance: x / SG: x / pH: x  Gluc: 25 mg/dL / Ketone: x  / Bili: x / Urobili: x   Blood: x / Protein: x / Nitrite: x   Leuk Esterase: x / RBC: x / WBC x   Sq Epi: x / Non Sq Epi: x / Bacteria: x            I & O Summary:    10-11-23 @ 07:01  -  10-12-23 @ 06:52  --------------------------------------------------------  IN: 500 mL / OUT: 1179 mL / NET: -679 mL        Microbiology:          RADIOLOGY & ADDITIONAL TESTS:    Imaging Personally Reviewed:  [ ] YES  [ ] NO  gabapentin 100 milliGRAM(s) Oral at bedtime  heparin   Injectable 5000 Unit(s) SubCutaneous every 12 hours  lidocaine   4% Patch 1 Patch Transdermal daily  multivitamin 1 Tablet(s) Oral daily  OLANZapine 2.5 milliGRAM(s) Oral at bedtime  OLANZapine Injectable 2.5 milliGRAM(s) IntraMuscular every 6 hours PRN  oxyCODONE    IR 10 milliGRAM(s) Oral every 4 hours PRN  pancrelipase  (CREON  6,000 Lipase Units) 1 Capsule(s) Oral three times a day with meals  polyethylene glycol 3350 17 Gram(s) Oral daily  senna 2 Tablet(s) Oral at bedtime  thiamine IVPB 500 milliGRAM(s) IV Intermittent three times a day      HEALTH ISSUES - PROBLEM Dx:  ESRD on hemodialysis    Bicytopenia    DVT (deep venous thrombosis)    Chronic systolic congestive heart failure    Need for prophylactic measure    Chronic pancreatitis    Cachexia    Psychosis             IMELDA ROBERTSON  63y  Male      Patient is a 63y old  Male who presents with a chief complaint of chronic pancreatitis pain (08 Oct 2023 07:03)      INTERVAL HPI/OVERNIGHT EVENTS:  S/p TTE w/ bubble study (10/11) negative for intracardiac shunt, LVEF 20-25% with moderate AI and moderate MR. No LV or RV enlargement.  - Afebrile, tachy 90s, on RA, SpO2 >99%  - Tele: sinus tachy with occasional PVCs -- refusing tele since this AM  - Refused qHS meds including gabapentin, Haldol, and senna  - Denies lightheadedness, fever, chills, headache, chest pain, dyspnea, nausea, emesis.      REVIEW OF SYSTEMS:  CONSTITUTIONAL: No fever, weight loss, or fatigue  ENMT: No sinus or throat pain  NECK: No pain or stiffness  RESPIRATORY: No cough, wheezing, chills or hemoptysis; No shortness of breath  CARDIOVASCULAR: No chest pain, palpitations, dizziness, or leg swelling  GASTROINTESTINAL: No abdominal or epigastric pain. No nausea, vomiting, or hematemesis; No diarrhea or constipation. No melena or hematochezia.  GENITOURINARY: No dysuria, frequency, hematuria, or incontinence  NEUROLOGICAL: No headaches,, loss of strength, numbness, or tremors  SKIN: No itching, burning, rashes, or lesions   MUSCULOSKELETAL: No joint pain or swelling; No muscle, back, or extremity pain    FAMILY HISTORY:  FH: HTN (hypertension)    Vital Signs Last 24 Hrs  T(C): 37.1 (12 Oct 2023 04:40), Max: 37.1 (11 Oct 2023 22:00)  T(F): 98.8 (12 Oct 2023 04:40), Max: 98.8 (11 Oct 2023 22:00)  HR: 93 (12 Oct 2023 04:40) (90 - 93)  BP: 127/68 (12 Oct 2023 04:40) (115/60 - 134/80)  BP(mean): --  RR: 18 (12 Oct 2023 04:40) (17 - 18)  SpO2: 100% (12 Oct 2023 04:40) (99% - 100%)    Parameters below as of 12 Oct 2023 04:40  Patient On (Oxygen Delivery Method): room air        Tylenol (Other)      PHYSICAL EXAM:  GENERAL: cachectic, chronically ill-appearing, conversable  HEAD:  Atraumatic, Normocephalic  EYES: EOMI, PERRLA, conjunctiva and sclera clear, teary eyes  ENMT: Moist mucous membranes, Good dentition, No lesions  NECK: Supple, No JVD, Normal thyroid  CHEST/LUNG: Clear to auscultation bilaterally; No rales, rhonchi, wheezing, or rubs  HEART: Regular rate and rhythm; No murmurs, rubs, or gallops  ABDOMEN: Soft, Nontender, Nondistended; Bowel sounds present  EXTREMITIES:  2+ Peripheral Pulses, No clubbing, cyanosis, or edema  LYMPH: No lymphadenopathy noted  SKIN: No rashes or lesions  NERVOUS SYSTEM:  Alert & Oriented X3, Good concentration; Motor Strength 5/5 B/L upper and lower extremities;   PSYCH: Occasionally makes illogical comments marked with psychosis/delusions    Consultant(s) Notes Reviewed:  [x ] YES  [ ] NO  Care Discussed with Consultants/Other Providers [ x] YES  [ ] NO      LABS:                        12.4   6.70  )-----------( 86       ( 11 Oct 2023 05:15 )             36.8     10-11    146<H>  |  107  |  47<H>  ----------------------------<  25<LL>  3.5   |  22  |  4.97<H>    Ca    7.9<L>      11 Oct 2023 05:15  Phos  3.7     10-11  Mg     1.90     10-11    TPro  5.4<L>  /  Alb  2.8<L>  /  TBili  0.6  /  DBili  x   /  AST  25  /  ALT  63<H>  /  AlkPhos  268<H>  10-11        CAPILLARY BLOOD GLUCOSE      POCT Blood Glucose.: 119 mg/dL (11 Oct 2023 11:13)          Urinalysis Basic - ( 11 Oct 2023 05:15 )    Color: x / Appearance: x / SG: x / pH: x  Gluc: 25 mg/dL / Ketone: x  / Bili: x / Urobili: x   Blood: x / Protein: x / Nitrite: x   Leuk Esterase: x / RBC: x / WBC x   Sq Epi: x / Non Sq Epi: x / Bacteria: x            I & O Summary:    10-11-23 @ 07:01  -  10-12-23 @ 06:52  --------------------------------------------------------  IN: 500 mL / OUT: 1179 mL / NET: -679 mL        Microbiology:          RADIOLOGY & ADDITIONAL TESTS:    Imaging Personally Reviewed:  [ ] YES  [ ] NO  gabapentin 100 milliGRAM(s) Oral at bedtime  heparin   Injectable 5000 Unit(s) SubCutaneous every 12 hours  lidocaine   4% Patch 1 Patch Transdermal daily  multivitamin 1 Tablet(s) Oral daily  OLANZapine 2.5 milliGRAM(s) Oral at bedtime  OLANZapine Injectable 2.5 milliGRAM(s) IntraMuscular every 6 hours PRN  oxyCODONE    IR 10 milliGRAM(s) Oral every 4 hours PRN  pancrelipase  (CREON  6,000 Lipase Units) 1 Capsule(s) Oral three times a day with meals  polyethylene glycol 3350 17 Gram(s) Oral daily  senna 2 Tablet(s) Oral at bedtime  thiamine IVPB 500 milliGRAM(s) IV Intermittent three times a day      HEALTH ISSUES - PROBLEM Dx:  ESRD on hemodialysis    Bicytopenia    DVT (deep venous thrombosis)    Chronic systolic congestive heart failure    Need for prophylactic measure    Chronic pancreatitis    Cachexia    Psychosis

## 2023-10-12 NOTE — BH CONSULTATION LIAISON PROGRESS NOTE - NSBHASSESSMENTFT_PSY_ALL_CORE
61-yo AAM, single, reportedly domiciled with his mother, patient denying any prior psychiatric history (was treated in senior care 25 years ago with haldol for unknown symptoms/diagnosis and never followed up after release from senior care) and history of alcohol abuse (reports quitting "long time ago"), PMH of HCV, Hx of esophageal stricture (S/p stenting in 5/22), Emphysema, chronic pancreatitis, multiple prior medical hospitalizations for confusion, pancreatitis, etc., psych c/s for psychosis.    Patient exhibiting estella psychosis - has delusions, paranoia, referential thoughts, thought insertion, grandose/hyperreligiousness. Given CKD and HD, concern for lewy body dementia and avoidance of EPS, would begin Olanzapine though must monitor QTc and LFTs which are already somewhat elevated. Needs further psych assessment. Cognitively intact despite worse psychosis than prior hospitalizations. No current signs of etoh withdrawal despite 0 etoh, no current role for CIWA unless such features abruptly begin.     10/9: Patient was calm but continues to exhibit estella psychosis. These features were similar in content to the examination on 10/6, particularly in regard to delusions, referential thoughts, thought insertion, and grandiose ideology. Patient found to have subacute/chronic L frontal lobe cortical infarct on CT head that was new compared to scan in August of 2023. QTc prolonged.    10/10: Patient was calm with no significant alterations in his state of psychosis. These features reflected those observed on previous examinations, with delusions, referential thoughts, though insertion, and grandiose ideology. Repeat EKG on 10/9 demonstrated a QTc of 462 ms. Patient can be started on standing haloperidol 1mg PO qHS at night for psychosis due to minimal interference with platelet function in the setting of consistent thrombocytopenia.     10/12: Patient denied all bedtime medications on 10/11, including haloperidol. Patient was initially calm but became upset during a discussion regarding the use of haloperidol. Repeat EKG on 10/11 demonstrated a QTc of 490 ms. Patient should be offered haloperidol 1mg PO qHS at night for continued psychosis.    Recs:  - no current need for 1:1  - cannot leave AMA needs further psych eval, patient psychotic  - Discontinue standing Olanzapine 2.5mg qHS in the setting of QTc prolongation and continued thrombocytopenia  - Repeat EKG on 10/9 demonstrated QTc of 462 ms; repeat EKG on 10/11 demonstrated QTc of 490 ms  - Recommend lorazepam 1mg q8h PRN PO/IM/IV for severe agitation - Will not prolong QTc interval  - Discontinue PRN severe agitation Olanzapine 2.5mg q6h PRN PO/IM  - Continue to offer standing haloperidol 1mg PO qHS at night *Please continue to monitor QTc interval and HOLD for QTc >500 ms*  - s/p empiric thiamine repletion 500mg IV TID for 3d (completed 10/9)  - CT head with subacute/chronic L frontal lobe cortical infarct, CTA head/neck with no large vessel occlusion or stenosis - patient's mental status much worse than prior hospitalizations  - Psych will follow   61-yo AAM, single, reportedly domiciled with his mother, patient denying any prior psychiatric history (was treated in longterm 25 years ago with haldol for unknown symptoms/diagnosis and never followed up after release from longterm) and history of alcohol abuse (reports quitting "long time ago"), PMH of HCV, Hx of esophageal stricture (S/p stenting in 5/22), Emphysema, chronic pancreatitis, multiple prior medical hospitalizations for confusion, pancreatitis, etc., psych c/s for psychosis.    Patient exhibiting estella psychosis - has delusions, paranoia, referential thoughts, thought insertion, grandiose/hyperreligiousness. Given CKD and HD, concern for lewy body dementia and avoidance of EPS, would begin Olanzapine though must monitor QTc and LFTs which are already somewhat elevated. Needs further psych assessment. Cognitively intact despite worse psychosis than prior hospitalizations. No current signs of etoh withdrawal despite 0 etoh, no current role for CIWA unless such features abruptly begin.     10/9: Patient was calm but continues to exhibit estella psychosis. These features were similar in content to the examination on 10/6, particularly in regard to delusions, referential thoughts, thought insertion, and grandiose ideology. Patient found to have subacute/chronic L frontal lobe cortical infarct on CT head that was new compared to scan in August of 2023. QTc prolonged.    10/10: Patient was calm with no significant alterations in his state of psychosis. These features reflected those observed on previous examinations, with delusions, referential thoughts, though insertion, and grandiose ideology. Repeat EKG on 10/9 demonstrated a QTc of 462 ms. Patient can be started on standing haloperidol 1mg PO qHS at night for psychosis due to minimal interference with platelet function in the setting of consistent thrombocytopenia.     10/12: Patient denied all bedtime medications on 10/11, including haloperidol. Patient was initially calm but became upset during a discussion regarding the use of haloperidol. Repeat EKG on 10/11 demonstrated a QTc of 490 ms. Patient should be offered haloperidol 1mg PO qHS at night for continued psychosis.    Recs:  - no current need for 1:1  - cannot leave AMA needs further psych eval, patient psychotic    - Repeat EKG on 10/9 demonstrated QTc of 462 ms; repeat EKG on 10/11 demonstrated QTc of 490 ms  - Recommend lorazepam 1mg q8h PRN PO/IM/IV for severe agitation - Will not prolong QTc interval  - Discontinue PRN severe agitation Olanzapine 2.5mg q6h PRN PO/IM  - Continue to offer standing haloperidol 1mg PO qHS at night *Please continue to monitor QTc interval and HOLD for QTc >500 ms*  - s/p empiric thiamine repletion 500mg IV TID for 3d (completed 10/9)  - CT head with subacute/chronic L frontal lobe cortical infarct, CTA head/neck with no large vessel occlusion or stenosis - patient's mental status much worse than prior hospitalizations  - Psych will follow

## 2023-10-12 NOTE — PROGRESS NOTE ADULT - PROBLEM SELECTOR PLAN 6
-nutrition consult appreciated  -passed bedside swallow test 10/11 AM, resumed regular diet. -CT A/P 10/5: Sequela of chronic pancreatitis with mild common bile duct dilatation unchanged. The prior peripancreatic foci of air have resolved.  -pain management c/s appreciated: gabapentin 100 bedtime; oxy 10 mg PO q4 prn; lidocaine patch x5d; outpt f/u  -Chronic pain recommends considering gradually weaning off opioids as tolerated (oxy dose reduction by 10% weekly, and continue f/u with outpatient Pain Medicine) and maximizing non-opioid analgesia as appropriate, as pain stable and patient does not have definite organic cause that opioids are actively treating -- appreciate recs  -Could consider Suboxone, patient previously declined  -c/w pancrelipase  -recent episodes of hypoglycemia may be attributed to his chronic pancreatitis endocrine dysfunction

## 2023-10-12 NOTE — SWALLOW VFSS/MBS ASSESSMENT ADULT - COMMENTS
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 Found to have psychosis/delusions significantly worse than prior hospitalizations despite appearing to have intact cognition, consulted Psychiatry for optimization of psychiatric meds. Found to have new L frontal lobe cortical infarct, Neurology following."    Of Note: Patient seen for a clinical swallow assessment on 10/11 with recommendations of regular solids with thin liquids and to Consider a Cinesophagram given CXR due to concern for aspiration pneumonia (please see note for details).    Patient received in radiology suite this afternoon for a Cinesophagram. Patient alert and able to verbalize wants/needs and follows simple directives. Patient required redirection back to feeding task at times.

## 2023-10-12 NOTE — CONSULT NOTE ADULT - NS ATTEND AMEND GEN_ALL_CORE FT
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 Patient is being followed by PSY[facility]. His course was notabled for subacute/chronic L frontal lobe cortical infarct on CT for which neurology is following. EP was consulted for ILR evaluation for prolonged monitoring for detection of AF/PAF as cause of potential cardioembolic source. Patient stated that he seen a cardiologist in the past and his heart is "fine". He denied a hx of cardiac arrhythmias. The risks and benefits were explained in detail which included but not limited to bleeding, infection, stroke, syncope 2/2 vasovagal, intubation, and death. Patient expressed understanding and all questions were answered. Patient stated that he does not want an ILR as he does not "trust" it. Patient also stated that he does not believe he has a stroke because he is functioning normally. Ep will sign off.

## 2023-10-13 NOTE — BH CONSULTATION LIAISON PROGRESS NOTE - NSBHFUPINTERVALHXFT_PSY_A_CORE
Chart reviewed. Vital signs stable. Patient refused to take haloperidol on 10/12. Patient has not required lorazepam PRN since 10/10. Patient seen and examined at bedside. Patient was calmly resting in bed. Patient reports his mood is "okay." Patient states he slept okay, but "not for 8 hours like we all want." Patient states he has been eating when he can and denies any nausea or vomiting. Patient was not observed eating during this evaluation. Patient reports he was not served dinner last night and was denied lunch for the past 3 days. Patient states the kitchen staff are upset with him and do not serve him dishes that he requests. Patient endorses continued thoughts that people on TV can "transfer" thoughts into his head. Patient's voice continuously fluctuates in pitch during the interview, which he attributes to the influence of "Raf Trinidad." Patient states he "came to be" at the age of 2 when he was dropped on his mother's doorstep, which is why he is unable to remember events from the age of 1. Patient reports that these thoughts began when he was in [facility]. Patient notes that "everyone was paying attention to me" in [facility] in an effort to protect him. Patient became agitated when he suspected that the provider did not "believe him." Patient was difficult to redirect throughout the interview and particularly tangential. Patient currently denies any SIIP, HIIP, or AVH.     Contacted patient's brother on 10/12 for collateral. Patient's brother does not know when these delusions began, but reports a distinct change in the patient's behavior after being released from [facility]. Patient's brother was unable to supply a direct timeline of the patient's incarceration, but states it was related to drug charges. After being released, patient's brother states the patient would refer to himself as God, but family members laughed it off and did not realize the patient held this as a fixed belief. Patient's brother describes the patient as a "good manoj" who primarily kept his delusions to himself. Patient's brother states that he does not believe the patient has ever been formally diagnosed with a psychiatric illness or admitted to a psychiatric hospital. Plan to contact patient's family for additional information to understand dispo options.  Chart reviewed. Vital signs stable. Patient refused to take haloperidol on 10/12. Patient has not required lorazepam PRN since 10/10. Patient seen and examined at bedside. Patient was calmly resting in bed. Patient reports his mood is "okay." Patient states he slept okay, but "not for 8 hours like we all want." Patient states he has been eating when he can and denies any nausea or vomiting. Patient was not observed eating during this evaluation. Patient reports he was not served dinner last night and was denied lunch for the past 3 days. Patient states the kitchen staff are upset with him and do not serve him dishes that he requests. Patient endorses continued thoughts that people on TV can "transfer" thoughts into his head. Patient's voice continuously fluctuates in pitch during the interview, which he attributes to the influence of "Raf Trinidad." Patient states he "came to be" at the age of 2 when he was dropped on his mother's doorstep, which is why he is unable to remember events from the age of 1. Patient reports that these thoughts began when he was in residential. Patient notes that "everyone was paying attention to me" in residential in an effort to protect him. Patient became agitated when he suspected that the provider did not "believe him." Patient was difficult to redirect throughout the interview and particularly tangential. Patient currently denies any SIIP, HIIP, or AVH.     Contacted patient's brother on 10/12 for collateral. Patient's brother does not know when these delusions began, but reports a distinct change in the patient's behavior after being released from residential. Patient's brother was unable to supply a direct timeline of the patient's incarceration, but states it was related to drug charges. After being released, patient's brother states the patient would refer to himself as God, but family members laughed it off and did not realize the patient held this as a fixed belief. Patient's brother describes the patient as a "good person" who primarily kept his delusions to himself. Patient's brother states that he does not believe the patient has ever been formally diagnosed with a psychiatric illness or admitted to a psychiatric hospital.     Contacted patient's mother on 10/13 for collateral. Patient's mother states that the patient was incarcerated from 1977 to 2005 for drug-related charges. Patient's mother states that the patient has been able to take care of himself at home (e.g., showering, brushing teeth, grooming) and helps with cooking. Patient's mother notes that the patient generally remains at home with her, but occasionally leaves to get food from the market. Patient's mother states that the patient is a "good person" and denies any history of agitation, aggression, or violence at home. Patient's mother states that she does not think the patient has ever been formally diagnosed with a psychiatric illness or admitted to a psychiatric hospital. Patient's mother reports that while the patient has experienced delusional thinking for some time, it does not typically interfere with his ability to care for himself. Patient's mother states that the patient has had difficulty with eating over the past few months, which accounts for his malnourished appearance. Patient's mother is unaware of the cause of the patient's diminished ability to eat, but believes it may be related to pain from his chronic pancreatitis. Patient's mother reports that the patient occasionally spits up while eating, which has been observed in the hospital as well. Chart reviewed. Vital signs stable. Patient refused to take haloperidol on 10/12. Patient has not required lorazepam PRN since 10/10. Patient seen and examined at bedside. Patient was calmly resting in bed. Patient reports his mood is "okay." Patient states he slept okay, but "not for 8 hours like we all want." Patient states he has been eating when he can and denies any nausea or vomiting. Patient was not observed eating during this evaluation. Patient reports he was not served dinner last night and was denied lunch for the past 3 days. Patient states the kitchen staff are upset with him and do not serve him dishes that he requests. Patient endorses continued thoughts that people on TV can "transfer" thoughts into his head. Patient's voice continuously fluctuates in pitch during the interview, which he attributes to the influence of "Raf Trinidad." Patient states he "came to be" at the age of 2 when he was dropped on his mother's doorstep, which is why he is unable to remember events from the age of 1. Patient reports that these thoughts began when he was in senior living. Patient notes that "everyone was paying attention to me" in senior living in an effort to protect him. Patient became agitated when he suspected that the provider did not "believe him." Patient was difficult to redirect throughout the interview and particularly tangential. Patient currently denies any SIIP, HIIP, or AVH.     Contacted patient's brother on 10/12 for collateral. Patient's brother does not know when these delusions began, but reports a distinct change in the patient's behavior after being released from senior living. Patient's brother was unable to supply a direct timeline of the patient's incarceration, but states it was related to drug charges. After being released, patient's brother states the patient would refer to himself as God, but family members laughed it off and did not realize the patient held this as a fixed belief. Patient's brother describes the patient as a "good person" who primarily kept his delusions to himself. Patient's brother states that he does not believe the patient has ever been formally diagnosed with a psychiatric illness or admitted to a psychiatric hospital.     Contacted patient's mother on 10/13 for collateral. Patient's mother states that the patient was incarcerated from 1977 to 2005 for drug-related charges. Patient's mother states that the patient has been able to take care of himself at home (e.g., showering, brushing teeth, grooming) and helps with cooking. Patient's mother notes that the patient generally remains at home with her, but occasionally leaves to get food from the market. Patient's mother states that the patient is a "good person" and denies any history of agitation, aggression, or violence at home. Patient's mother states that she does not think the patient has ever been formally diagnosed with a psychiatric illness or admitted to a psychiatric hospital. Patient's mother reports that while the patient has experienced delusional thinking for some time, it does not typically interfere with his ability to care for himself. Patient's mother states that the patient has had difficulty with eating over the past few months, which accounts for his malnourished appearance. Patient's mother is unaware of the cause of the patient's diminished ability to eat, but believes it may be related to pain from his chronic pancreatitis. Patient's mother reports that the patient occasionally spits up while eating, which has been observed in the hospital as well.    Reviewed prior  psychiatry notes- 2022 and 2019---- no mentions of psychosis during these assessments. Pain complaints. Hoarding hospital food to take home to save on groceries.

## 2023-10-13 NOTE — PROGRESS NOTE ADULT - PROBLEM SELECTOR PLAN 3
-BNP >59901, troponin 131  -Ddx includes ischemic CM, DCM 2/2 EtOH  -Clinically euvolemic  -monitor I/Os, daily weights  -S/p TTE w/ bubble study (10/11) negative for intracardiac shunt, LVEF 20-25% with moderate AI and moderate MR. No LV or RV enlargement.  -Consult EP for evaluation for extended cardiac monitoring with ILR  -Contact outpatient Cardiologist (Dr. Brian Harris) for follow-up plans for ischemic w/u and GDMT optimization  -Start metop succ 25 mg QD, with plan to start Losartan tomorrow (10/13) if BP tolerates -BNP >80034, troponin 131  -Ddx includes ischemic CM, DCM 2/2 EtOH  -Clinically euvolemic  -monitor I/Os, daily weights  -S/p TTE w/ bubble study (10/11) negative for intracardiac shunt, LVEF 20-25% with moderate AI and moderate MR. No LV or RV enlargement.  -Contact outpatient Cardiologist (Dr. Brian Juárez) for follow-up plans for ischemic w/u and GDMT optimization  -Started metop succ 25 mg QD 10/12  -Start Losartan 25 QD today 10/13

## 2023-10-13 NOTE — PROGRESS NOTE ADULT - PROBLEM SELECTOR PLAN 5
#macrocytic anemia, likely i/s/o chronic illness/nutritional deficiency  trend H/H -- stable    #thrombocytopenia   evaluated by heme on prior admission, thought to be multifactorial   chronic/stable  monitor/trend

## 2023-10-13 NOTE — PROGRESS NOTE ADULT - PROBLEM SELECTOR PLAN 2
- CTH with subacute/chronic L frontal lobe cortical infarct (new compared to Aug 2023)  - Patient to remain on tele while admitted per Neuro  - Pending CTH  - S/p CTA H/N 10/11 with no new progression; subacute-chronic L frontal lobe cortical infarct, no large vessel occlusion or major stenosis.  - TTE w/ bubble study 10/11 negative for intracardiac shunt  - Neurology following -- appreciate recs  - HbA1c 4.1% (10/12)  - Start atorvastatin 40 mg -- Lipid panel WNL  - Plan to start ASA81 if plt >70k tomorrow 10/13  - Pt to follow up with Dr. Saenz after discharge:  [address] [place] [address]. Call [phone]. - CTH with subacute/chronic L frontal lobe cortical infarct (new compared to Aug 2023)  - Patient to remain on tele while admitted per Neuro  - S/p CTA H/N 10/11 with no new progression; subacute-chronic L frontal lobe cortical infarct, no large vessel occlusion or major stenosis.  - TTE w/ bubble study 10/11 negative for intracardiac shunt  - Neurology following -- appreciate recs  - HbA1c 4.1% (10/12)  - Start atorvastatin 40 mg -- Lipid panel WNL  - Plan to start ASA81 once stable after IR bx procedure today 10/13  - F/u with Neuro regarding workup for a possible organic cause for worsening psychosis  - Pt to follow up with Dr. Saenz after discharge:  [address] [place] [address]. Call [phone]. - CTH with subacute/chronic L frontal lobe cortical infarct (new compared to Aug 2023)  - Patient to remain on tele while admitted per Neuro  - S/p CTA H/N 10/11 with no new progression; subacute-chronic L frontal lobe cortical infarct, no large vessel occlusion or major stenosis.  - TTE w/ bubble study 10/11 negative for intracardiac shunt  - Neurology following -- appreciate recs  - HbA1c 4.1% (10/12)  - Start atorvastatin 40 mg -- Lipid panel WNL  - Plan to start ASA81 once stable on 10/14  - F/u with Neuro regarding workup for a possible organic cause for worsening psychosis  - Pt to follow up with Dr. Saenz after discharge:  [address] [place] [address]. Call [phone].

## 2023-10-13 NOTE — BH CONSULTATION LIAISON PROGRESS NOTE - ATTENDING COMMENTS
Discussed case with resident md, impression and plan discussed and agreed upon.    coordinated care with Dr Tomas--- discussed that there is a possibility that psychosis has been more chronic (it is interesting though that prior cl notes in 2019/2022 did not document it) which has not been limiting or affecting his ability to function at home, has not posed a risk to himself or others. Psychosis is more apparent now. Abdominal pain, lost weight, poor PO intake prior to admission, has a subacute frontal infarct on recent CT head. There is a need to r/o organic causes for worsening psychosis.   Discussed checking RPR, HIV, malignancy work up. Is there a role for MRI brain? Neurology f/u to assess for other causes.

## 2023-10-13 NOTE — PROGRESS NOTE ADULT - PROBLEM SELECTOR PLAN 1
- Psych c/s appreciated: pt with estella psychosis; cannot leave AMA but no current need for 1:1  - Current Psych recs:  -- standing haloperidol 1mg PO qHS *continue to monitor QTc interval and HOLD for QTc >500 ms*  -- lorazepam 1mg q8h PRN PO/IM/IV for severe agitation per Psych  - Daily ECG for QTc evaluation (QTc on 10/11: 490 ms)  - Not a candidate for Psychiatry admission while on HD - Psych c/s appreciated: pt with estella psychosis; cannot leave AMA but no current need for 1:1  - Current Psych recs:  -- standing haloperidol 1mg PO qHS *continue to monitor QTc interval and HOLD for QTc >500 ms*  -- lorazepam 1mg q8h PRN PO/IM/IV for severe agitation per Psych  - Daily ECG for QTc evaluation (QTc on 10/11: 490 ms)  - Not a candidate for Psychiatry admission while on HD  - Need to r/o organic causes for worsening psychosis per Psych - f/u Neuro regarding checking RPR, HIV, malignancy work up, MRI brain

## 2023-10-13 NOTE — PROGRESS NOTE ADULT - PROBLEM SELECTOR PLAN 8
-previously on apixaban, reportedly for chronic R brachial DVT per [employer] review (doppler from 1/26/23 noted acute DVT at that time, cannot find repeat doppler confirming chronicity in EMR)  -Doppler August 2023 with no DVT in RUE  -currently on SQ heparin

## 2023-10-13 NOTE — BH CONSULTATION LIAISON PROGRESS NOTE - NSBHCHARTREVIEWINVESTIGATE_PSY_A_CORE FT
ACC: [id number] EXAM:  CT BRAIN   ORDERED BY: BRUCE SUAREZ DATE:  10/06/2023      INTERPRETATION:  CLINICAL INDICATIONS:  Acute encephalopathy    COMPARISON: Head CT dated 8/24/2023    TECHNIQUE: Noncontrast CT of the head. Multiplanarreformations are   submitted.    FINDINGS: Multiple chronic bilateral basal ganglia: Infarctions on image   26 of series 2. Small subacute/chronic left frontal lobe cortical infarct   on image 43 of series 2  There is periventricular and subcortical white matter hypodensity without   mass effect, nonspecific, likely representing mild chronic microvascular   ischemic changes. There is no other compelling evidence for an acute   transcortical infarction. There is no evidence of mass, mass effect,   midline shift or extra-axial fluid collection. The lateral ventricles and   cortical sulci are age-appropriate in size and configuration. The orbits,   mastoid air cells and visualized paranasal sinuses are unremarkable. The   calvarium is intact. Consider MRI as clinically warranted.    IMPRESSION: Subacute/chronic left frontal lobe cortical infarct. Consider   MRI for further evaluation.
ACC: [id number] EXAM:  CT ANGIO NECK (W)AW IC     ACC: [id number] EXAM:  CT ANGIO BRAIN (W)AW IC       PROCEDURE DATE:  10/11/2023      INTERPRETATION:  .    CLINICAL INFORMATION: Acute encephalopathy, new chronic/subacute left   frontal infarct.    TECHNIQUE: Transaxial CT images were acquired through the head without   the administration of IV contrast. Thin section CT angiography from the   aortic arch to the cranial vertex was also performed using a multislice   CT scanner, following the infusion of intravenous contrast material. 90   cc's of IV Omnipaque 350 was administered without immediate complication.   10 cc's was discarded. Sagittal and coronal MIP reformatted images were   obtained from the source data. Both the axial source and reconstructed   images were reviewed.    COMPARISON: Most recent prior head CT examination dated 10/6/2023.    FINDINGS:    Head CT: A subacute-chronic appearing infarct is again seen within the   left frontal lobe.    Additional chronic lacunar infarcts are again seen within the bilateral   basal ganglia.    There is no acute intracranial hemorrhage, mass effect, shift of the   midline structures, herniation, hydrocephalus, or abnormal intra-axial   fluid collection.    The paranasal sinuses and tympanomastoid cavities are clear. The   calvarium appears intact. The orbits appear unremarkable.    CTA NECK: There is a standard anatomic configuration to the aortic arch.   The origins of the great vessels appear unremarkable.    The bilateral common carotid arteries, carotid bifurcations, and cervical   internal carotid arteries appear grossly unremarkable.    The origins of the bilateral vertebral arteries are normal. The vertebral   arteries are patent throughout the course in the neck.    Numerous collateral venous channels are seen which enhance with contrast   around the spine.    A patchy opacity in the right upper lobe is seen.    CTA HEAD: Mild calcified plaques affect the bilateral carotid siphons   without associated stenosis. Otherwise, the bilateral intracranial   internal carotid, anterior, and middle cerebral arteries appear   unremarkable.    The anterior and bilateral posterior communicating arteries are seen.    The posterior circulation appears patent.    The intracranial venous structures are patent.    IMPRESSION:    Head CT: Stable follow-up CT exam.    CTA neck and head: No large vessel occlusion or major stenosis.    Patchy right upper lobe opacity for which pneumonia is a possibility.   Recommend imaging follow-up to resolution.
ACC: [id number] EXAM:  CT ANGIO NECK (W)AW IC     ACC: [id number] EXAM:  CT ANGIO BRAIN (W)AW IC       PROCEDURE DATE:  10/11/2023      INTERPRETATION:  .    CLINICAL INFORMATION: Acute encephalopathy, new chronic/subacute left   frontal infarct.    TECHNIQUE: Transaxial CT images were acquired through the head without   the administration of IV contrast. Thin section CT angiography from the   aortic arch to the cranial vertex was also performed using a multislice   CT scanner, following the infusion of intravenous contrast material. 90   cc's of IV Omnipaque 350 was administered without immediate complication.   10 cc's was discarded. Sagittal and coronal MIP reformatted images were   obtained from the source data. Both the axial source and reconstructed   images were reviewed.    COMPARISON: Most recent prior head CT examination dated 10/6/2023.    FINDINGS:    Head CT: A subacute-chronic appearing infarct is again seen within the   left frontal lobe.    Additional chronic lacunar infarcts are again seen within the bilateral   basal ganglia.    There is no acute intracranial hemorrhage, mass effect, shift of the   midline structures, herniation, hydrocephalus, or abnormal intra-axial   fluid collection.    The paranasal sinuses and tympanomastoid cavities are clear. The   calvarium appears intact. The orbits appear unremarkable.    CTA NECK: There is a standard anatomic configuration to the aortic arch.   The origins of the great vessels appear unremarkable.    The bilateral common carotid arteries, carotid bifurcations, and cervical   internal carotid arteries appear grossly unremarkable.    The origins of the bilateral vertebral arteries are normal. The vertebral   arteries are patent throughout the course in the neck.    Numerous collateral venous channels are seen which enhance with contrast   around the spine.    A patchy opacity in the right upper lobe is seen.    CTA HEAD: Mild calcified plaques affect the bilateral carotid siphons   without associated stenosis. Otherwise, the bilateral intracranial   internal carotid, anterior, and middle cerebral arteries appear   unremarkable.    The anterior and bilateral posterior communicating arteries are seen.    The posterior circulation appears patent.    The intracranial venous structures are patent.    IMPRESSION:    Head CT: Stable follow-up CT exam.    CTA neck and head: No large vessel occlusion or major stenosis.    Patchy right upper lobe opacity for which pneumonia is a possibility.   Recommend imaging follow-up to resolution.

## 2023-10-13 NOTE — BH CONSULTATION LIAISON PROGRESS NOTE - OTHER
Difficult to redirect to the questions being asked. Patient describes mood as "okay." Patient became irritable when he suspected that the providers did not believe him. Appears older than actual age

## 2023-10-13 NOTE — PROGRESS NOTE ADULT - PROBLEM SELECTOR PLAN 6
-CT A/P 10/5: Sequela of chronic pancreatitis with mild common bile duct dilatation unchanged. The prior peripancreatic foci of air have resolved.  -pain management c/s appreciated: gabapentin 100 bedtime; oxy 10 mg PO q4 prn; lidocaine patch x5d; outpt f/u  -Chronic pain recommends considering gradually weaning off opioids as tolerated (oxy dose reduction by 10% weekly, and continue f/u with outpatient Pain Medicine) and maximizing non-opioid analgesia as appropriate, as pain stable and patient does not have definite organic cause that opioids are actively treating -- appreciate recs  -Could consider Suboxone, patient previously declined  -c/w pancrelipase  -recent episodes of hypoglycemia may be attributed to his chronic pancreatitis endocrine dysfunction -CT A/P 10/5: Sequela of chronic pancreatitis with mild common bile duct dilatation unchanged. The prior peripancreatic foci of air have resolved.  -pain management c/s appreciated: gabapentin 100 bedtime; oxy 10 mg PO q4 prn; lidocaine patch x5d; outpt f/u  -Chronic pain recommends considering gradually weaning off opioids as tolerated (oxy dose reduction by 10% weekly, and continue f/u with outpatient Pain Medicine) and maximizing non-opioid analgesia as appropriate, as pain stable and patient does not have definite organic cause that opioids are actively treating -- appreciate recs  -Could consider Suboxone, patient previously declined  -c/w pancrelipase  -recent episodes of hypoglycemia may be attributed to his chronic pancreatitis endocrine dysfunction  -F/u with outpatient PCP (Dr. Hector Kelly) regarding pain regimen for discharge

## 2023-10-13 NOTE — PROGRESS NOTE ADULT - PROBLEM SELECTOR PLAN 7
-nutrition consult appreciated  -passed bedside swallow test 10/11 AM, resumed regular diet.  -S/p SLP, recs regular solids and thin liquids  -order cine esophagram

## 2023-10-13 NOTE — PROGRESS NOTE ADULT - SUBJECTIVE AND OBJECTIVE BOX
[facility] Division of Kidney Diseases & Hypertension  FOLLOW UP NOTE  [phone]--------------------------------------------------------------------------------    Chief Complaint: ESRD/HD management    24 hour events/subjective: Pt seen and examined at the bedside today eating breakfast. Pt's pain is well controlled. Has a persistent cough. Denies HA, CP, SOB, trouble swallowing, n/v, or leg swelling.    PAST HISTORY  --------------------------------------------------------------------------------  No significant changes to PMH, PSH, FHx, SHx, unless otherwise noted    ALLERGIES & MEDICATIONS  --------------------------------------------------------------------------------  Allergies  Tylenol (Other)    Intolerances    Standing Inpatient Medications  atorvastatin 40 milliGRAM(s) Oral at bedtime  chlorhexidine 2% Cloths 1 Application(s) Topical daily  dextrose 5%. 1000 milliLiter(s) IV Continuous <Continuous>  dextrose 5%. 1000 milliLiter(s) IV Continuous <Continuous>  dextrose 50% Injectable 25 Gram(s) IV Push once  dextrose 50% Injectable 12.5 Gram(s) IV Push once  dextrose 50% Injectable 25 Gram(s) IV Push once  gabapentin 100 milliGRAM(s) Oral at bedtime  glucagon  Injectable 1 milliGRAM(s) IntraMuscular once  haloperidol     Tablet 1 milliGRAM(s) Oral at bedtime  heparin   Injectable 5000 Unit(s) SubCutaneous every 12 hours  losartan 25 milliGRAM(s) Oral daily  metoprolol succinate ER 25 milliGRAM(s) Oral daily  multivitamin 1 Tablet(s) Oral daily  mupirocin 2% Ointment 1 Application(s) Both Nostrils two times a day  pancrelipase  (CREON  6,000 Lipase Units) 1 Capsule(s) Oral three times a day with meals  polyethylene glycol 3350 17 Gram(s) Oral daily  senna 2 Tablet(s) Oral at bedtime  vitamin E 200 International Unit(s) Oral daily    PRN Inpatient Medications  dextrose Oral Gel 15 Gram(s) Oral once PRN  LORazepam   Injectable 1 milliGRAM(s) IV Push every 8 hours PRN  LORazepam   Injectable 1 milliGRAM(s) IV Push once PRN  oxyCODONE    IR 10 milliGRAM(s) Oral every 4 hours PRN    REVIEW OF SYSTEMS  --------------------------------------------------------------------------------  Gen: No fevers/chills  Head/Eyes/Ears/Mouth: No headache  Respiratory: No dyspnea, +cough  CV: No chest pain  GI: No abdominal pain, diarrhea, constipation, nausea, vomiting  MSK: No joint pain, no edema  Neuro: No dizziness/lightheadedness, weakness    All other systems were reviewed and are negative, except as noted.    VITALS/PHYSICAL EXAM  --------------------------------------------------------------------------------  T(C): 36.4 (10-13-23 @ 05:00), Max: 37 (10-12-23 @ 17:46)  HR: 88 (10-13-23 @ 05:00) (87 - 98)  BP: 137/75 (10-13-23 @ 05:00) (121/72 - 137/75)  RR: 19 (10-13-23 @ 05:00) (18 - 20)  SpO2: 100% (10-13-23 @ 05:00) (100% - 100%)  Wt(kg): --    Weight (kg): 44.4 (10-13-23 @ 07:26)    Physical Exam:  Gen: NAD, appears cachectic  HEENT: No JVD  Pulm: CTA B/L  CV: S1S2+  Abd: Soft, +BS   Ext: No B/L LE edema  Neuro: Awake  Skin: Warm and dry  Vascular access: LUE AVF site: dressing seen, thrill felt    LABS/STUDIES  --------------------------------------------------------------------------------              9.6    5.13  >-----------<  105      [10-13-23 @ 05:03]              29.3     141  |  102  |  46  ----------------------------<  167      [10-13-23 @ 05:03]  3.9   |  23  |  4.53        Ca     7.4     [10-13-23 @ 05:03]      Mg     2.00     [10-13-23 @ 05:03]      Phos  3.4     [10-13-23 @ 05:03]    TPro  4.7  /  Alb  2.4  /  TBili  0.3  /  DBili  x   /  AST  53  /  ALT  68  /  AlkPhos  225  [10-13-23 @ 05:03]    Creatinine Trend:  SCr 4.53 [10-13 @ 05:03]  SCr 3.72 [10-12 @ 05:00]  SCr 4.97 [10-11 @ 05:15]  SCr 3.86 [10-10 @ 05:25]  SCr 4.74 [10-09 @ 06:00]    Lipid: chol 96, TG 62, HDL 56, LDL --      [10-12-23 @ 05:00]

## 2023-10-13 NOTE — PROGRESS NOTE ADULT - ATTENDING COMMENTS
62M w/ PMH of HTN, ESRD on HD TIW (MWF) at [facility], chronic pancreatitis, and Hep C who presents to the ED complaining of abdominal pain and weakness. Nephrology was consulted for management of ESRD on HD.  +abdominal pain.  Emaciated, poor appetite.  Appetite improving, loves GLUCERNA.  HD today.  DEnies SOB but + cough  1.  ESRD--concur with orders reviewed with fellow, HD RN.  Increased UF  2.  Protein calorie malnutrition--dietary input for protein, calories.  Chronic pancreatitis related malabsorption, other.  On Creon.  WOULD NOT CONFINE TO NEPRO supplement.  ANY THAT HE'LL DRINK IN QUANTITY like glucerna which should mitigate hypoglycemia.  We'll dialyze if excessive protein and bind PO4.    3.  HYpokalemia--4 K dialysate, increased nutrition  4.  HYpoglycemia--check cortisol, endo input.  Increased nutrition as tolerated with extra glucerna  5.  Volume overload-- BNP 70K requires repeat.  NOT SOB but may benefit from decreased dry water weight.      discussed with med team  Wilfredo Cruz MD  contact me on TEAMS

## 2023-10-13 NOTE — PROGRESS NOTE ADULT - PROBLEM SELECTOR PLAN 1
Pt with ESRD on HD TIW (MWF). Tolerated last treatment well. Pt planned for HD today. Increased UF to 3.5L due to elevated BNP. Pt clinically stable. Labs reviewed. Monitor BP and labs.      If you have any questions, please feel free to contact me.  Yovanny Aparicio  Nephrology Fellow  [id number] / [employer] Teams (Preferred)  (After 4pm or on weekends, please call the on-call Fellow).

## 2023-10-13 NOTE — PROGRESS NOTE ADULT - SUBJECTIVE AND OBJECTIVE BOX
IMELDA ROBERTSON  63y  Male      Patient is a 63y old  Male who presents with a chief complaint of chronic pancreatitis pain (08 Oct 2023 07:03)      INTERVAL HPI/OVERNIGHT EVENTS:  ***  - Afebrile, tachy 90s, on RA, SpO2 >99%  - Denies lightheadedness, fever, chills, headache, chest pain, dyspnea, nausea, emesis.      REVIEW OF SYSTEMS:  CONSTITUTIONAL: No fever, weight loss, or fatigue  ENMT: No sinus or throat pain  NECK: No pain or stiffness  RESPIRATORY: No cough, wheezing, chills or hemoptysis; No shortness of breath  CARDIOVASCULAR: No chest pain, palpitations, dizziness, or leg swelling  GASTROINTESTINAL: No abdominal or epigastric pain. No nausea, vomiting, or hematemesis; No diarrhea or constipation. No melena or hematochezia.  GENITOURINARY: No dysuria, frequency, hematuria, or incontinence  NEUROLOGICAL: No headaches,, loss of strength, numbness, or tremors  SKIN: No itching, burning, rashes, or lesions   MUSCULOSKELETAL: No joint pain or swelling; No muscle, back, or extremity pain    FAMILY HISTORY:  FH: HTN (hypertension)    Vital Signs Last 24 Hrs  T(C): 36.4 (13 Oct 2023 05:00), Max: 37 (12 Oct 2023 17:46)  T(F): 97.5 (13 Oct 2023 05:00), Max: 98.6 (12 Oct 2023 17:46)  HR: 88 (13 Oct 2023 05:00) (87 - 98)  BP: 137/75 (13 Oct 2023 05:00) (120/73 - 137/75)  BP(mean): --  RR: 19 (13 Oct 2023 05:00) (18 - 20)  SpO2: 100% (13 Oct 2023 05:00) (100% - 100%)    Parameters below as of 13 Oct 2023 05:00  Patient On (Oxygen Delivery Method): room air        Tylenol (Other)      PHYSICAL EXAM:  GENERAL: cachectic, chronically ill-appearing, conversable  HEAD:  Atraumatic, Normocephalic  EYES: EOMI, PERRLA, conjunctiva and sclera clear, teary eyes  ENMT: Moist mucous membranes, Good dentition, No lesions  NECK: Supple, No JVD, Normal thyroid  CHEST/LUNG: Clear to auscultation bilaterally; No rales, rhonchi, wheezing, or rubs  HEART: Regular rate and rhythm; No murmurs, rubs, or gallops  ABDOMEN: Soft, Nontender, Nondistended; Bowel sounds present  EXTREMITIES:  2+ Peripheral Pulses, No clubbing, cyanosis, or edema  LYMPH: No lymphadenopathy noted  SKIN: No rashes or lesions  NERVOUS SYSTEM:  Alert & Oriented X3, Good concentration; Motor Strength 5/5 B/L upper and lower extremities;   PSYCH: Occasionally makes illogical comments marked with psychosis/delusions    Consultant(s) Notes Reviewed:  [x ] YES  [ ] NO  Care Discussed with Consultants/Other Providers [ x] YES  [ ] NO      LABS:                        9.6    5.13  )-----------( 105      ( 13 Oct 2023 05:03 )             29.3     10-12    144  |  105  |  35<H>  ----------------------------<  115<H>  4.3   |  26  |  3.72<H>    Ca    8.2<L>      12 Oct 2023 05:00  Phos  3.5     10-12  Mg     1.90     10-12    TPro  4.9<L>  /  Alb  2.5<L>  /  TBili  0.3  /  DBili  x   /  AST  34  /  ALT  57<H>  /  AlkPhos  227<H>  10-12        CAPILLARY BLOOD GLUCOSE      POCT Blood Glucose.: 177 mg/dL (13 Oct 2023 05:01)        Urinalysis Basic - ( 12 Oct 2023 05:00 )    Color: x / Appearance: x / SG: x / pH: x  Gluc: 115 mg/dL / Ketone: x  / Bili: x / Urobili: x   Blood: x / Protein: x / Nitrite: x   Leuk Esterase: x / RBC: x / WBC x   Sq Epi: x / Non Sq Epi: x / Bacteria: x            I & O Summary:      Microbiology:          RADIOLOGY & ADDITIONAL TESTS:    Imaging Personally Reviewed:  [ ] YES  [ ] NO  gabapentin 100 milliGRAM(s) Oral at bedtime  heparin   Injectable 5000 Unit(s) SubCutaneous every 12 hours  lidocaine   4% Patch 1 Patch Transdermal daily  multivitamin 1 Tablet(s) Oral daily  OLANZapine 2.5 milliGRAM(s) Oral at bedtime  OLANZapine Injectable 2.5 milliGRAM(s) IntraMuscular every 6 hours PRN  oxyCODONE    IR 10 milliGRAM(s) Oral every 4 hours PRN  pancrelipase  (CREON  6,000 Lipase Units) 1 Capsule(s) Oral three times a day with meals  polyethylene glycol 3350 17 Gram(s) Oral daily  senna 2 Tablet(s) Oral at bedtime  thiamine IVPB 500 milliGRAM(s) IV Intermittent three times a day      HEALTH ISSUES - PROBLEM Dx:  ESRD on hemodialysis    Bicytopenia    DVT (deep venous thrombosis)    Chronic systolic congestive heart failure    Need for prophylactic measure    Chronic pancreatitis    Cachexia    Psychosis             IMELDA ROBERTSON  63y  Male      Patient is a 63y old  Male who presents with a chief complaint of chronic pancreatitis pain (08 Oct 2023 07:03)      INTERVAL HPI/OVERNIGHT EVENTS:  - Pt declined qHS Haldol  - Afebrile, HDS, on RA, SpO2 >100%  - Patient standing up and in good mood. States that his lunch and dinner   - Denies lightheadedness, fever, chills, headache, chest pain, dyspnea, nausea, emesis.      REVIEW OF SYSTEMS:  CONSTITUTIONAL: No fever, weight loss, or fatigue  ENMT: No sinus or throat pain  NECK: No pain or stiffness  RESPIRATORY: No cough, wheezing, chills or hemoptysis; No shortness of breath  CARDIOVASCULAR: No chest pain, palpitations, dizziness, or leg swelling  GASTROINTESTINAL: No abdominal or epigastric pain. No nausea, vomiting, or hematemesis; No diarrhea or constipation. No melena or hematochezia.  GENITOURINARY: No dysuria, frequency, hematuria, or incontinence  NEUROLOGICAL: No headaches,, loss of strength, numbness, or tremors  SKIN: No itching, burning, rashes, or lesions   MUSCULOSKELETAL: No joint pain or swelling; No muscle, back, or extremity pain    FAMILY HISTORY:  FH: HTN (hypertension)    Vital Signs Last 24 Hrs  T(C): 36.4 (13 Oct 2023 05:00), Max: 37 (12 Oct 2023 17:46)  T(F): 97.5 (13 Oct 2023 05:00), Max: 98.6 (12 Oct 2023 17:46)  HR: 88 (13 Oct 2023 05:00) (87 - 98)  BP: 137/75 (13 Oct 2023 05:00) (120/73 - 137/75)  BP(mean): --  RR: 19 (13 Oct 2023 05:00) (18 - 20)  SpO2: 100% (13 Oct 2023 05:00) (100% - 100%)    Parameters below as of 13 Oct 2023 05:00  Patient On (Oxygen Delivery Method): room air        Tylenol (Other)      PHYSICAL EXAM:  GENERAL: cachectic, chronically ill-appearing, conversable  HEAD:  Atraumatic, Normocephalic  EYES: EOMI, PERRLA, conjunctiva and sclera clear, teary eyes  ENMT: Moist mucous membranes, Good dentition, No lesions  NECK: Supple, No JVD, Normal thyroid  CHEST/LUNG: Clear to auscultation bilaterally; No rales, rhonchi, wheezing, or rubs  HEART: Regular rate and rhythm; No murmurs, rubs, or gallops  ABDOMEN: Soft, Nontender, Nondistended; Bowel sounds present  EXTREMITIES:  2+ Peripheral Pulses, No clubbing, cyanosis, or edema  LYMPH: No lymphadenopathy noted  SKIN: No rashes or lesions  NERVOUS SYSTEM:  Alert & Oriented X3, Good concentration; Motor Strength 5/5 B/L upper and lower extremities;   PSYCH: Occasionally makes illogical comments marked with psychosis/delusions    Consultant(s) Notes Reviewed:  [x ] YES  [ ] NO  Care Discussed with Consultants/Other Providers [ x] YES  [ ] NO      LABS:                        9.6    5.13  )-----------( 105      ( 13 Oct 2023 05:03 )             29.3     10-12    144  |  105  |  35<H>  ----------------------------<  115<H>  4.3   |  26  |  3.72<H>    Ca    8.2<L>      12 Oct 2023 05:00  Phos  3.5     10-12  Mg     1.90     10-12    TPro  4.9<L>  /  Alb  2.5<L>  /  TBili  0.3  /  DBili  x   /  AST  34  /  ALT  57<H>  /  AlkPhos  227<H>  10-12        CAPILLARY BLOOD GLUCOSE      POCT Blood Glucose.: 177 mg/dL (13 Oct 2023 05:01)        Urinalysis Basic - ( 12 Oct 2023 05:00 )    Color: x / Appearance: x / SG: x / pH: x  Gluc: 115 mg/dL / Ketone: x  / Bili: x / Urobili: x   Blood: x / Protein: x / Nitrite: x   Leuk Esterase: x / RBC: x / WBC x   Sq Epi: x / Non Sq Epi: x / Bacteria: x            I & O Summary:      Microbiology:          RADIOLOGY & ADDITIONAL TESTS:    Imaging Personally Reviewed:  [ ] YES  [ ] NO  gabapentin 100 milliGRAM(s) Oral at bedtime  heparin   Injectable 5000 Unit(s) SubCutaneous every 12 hours  lidocaine   4% Patch 1 Patch Transdermal daily  multivitamin 1 Tablet(s) Oral daily  OLANZapine 2.5 milliGRAM(s) Oral at bedtime  OLANZapine Injectable 2.5 milliGRAM(s) IntraMuscular every 6 hours PRN  oxyCODONE    IR 10 milliGRAM(s) Oral every 4 hours PRN  pancrelipase  (CREON  6,000 Lipase Units) 1 Capsule(s) Oral three times a day with meals  polyethylene glycol 3350 17 Gram(s) Oral daily  senna 2 Tablet(s) Oral at bedtime  thiamine IVPB 500 milliGRAM(s) IV Intermittent three times a day      HEALTH ISSUES - PROBLEM Dx:  ESRD on hemodialysis    Bicytopenia    DVT (deep venous thrombosis)    Chronic systolic congestive heart failure    Need for prophylactic measure    Chronic pancreatitis    Cachexia    Psychosis             IMELDA ROBERTSON  63y  Male      Patient is a 63y old  Male who presents with a chief complaint of chronic pancreatitis pain (08 Oct 2023 07:03)      INTERVAL HPI/OVERNIGHT EVENTS:  - Pt declined qHS Haldol  - Afebrile, HDS, on RA, SpO2 >100%  - Patient standing up and in good mood. States that his lunch and dinner were not given yesterday. Continues to state that he "created the people who created Superman"  - Pt thinks that the recent finding of L frontal lobe cortical infarct is an "attempted stroke" and that it will "be gone the next time we take another scan." Does not want to consider ILR.  - Denies lightheadedness, fever, chills, headache, chest pain, dyspnea, nausea, emesis.      REVIEW OF SYSTEMS:  CONSTITUTIONAL: No fever, weight loss, or fatigue  ENMT: No sinus or throat pain  NECK: No pain or stiffness  RESPIRATORY: No cough, wheezing, chills or hemoptysis; No shortness of breath  CARDIOVASCULAR: No chest pain, palpitations, dizziness, or leg swelling  GASTROINTESTINAL: No abdominal or epigastric pain. No nausea, vomiting, or hematemesis; No diarrhea or constipation. No melena or hematochezia.  GENITOURINARY: No dysuria, frequency, hematuria, or incontinence  NEUROLOGICAL: No headaches,, loss of strength, numbness, or tremors  SKIN: No itching, burning, rashes, or lesions   MUSCULOSKELETAL: No joint pain or swelling; No muscle, back, or extremity pain    FAMILY HISTORY:  FH: HTN (hypertension)    Vital Signs Last 24 Hrs  T(C): 36.4 (13 Oct 2023 05:00), Max: 37 (12 Oct 2023 17:46)  T(F): 97.5 (13 Oct 2023 05:00), Max: 98.6 (12 Oct 2023 17:46)  HR: 88 (13 Oct 2023 05:00) (87 - 98)  BP: 137/75 (13 Oct 2023 05:00) (120/73 - 137/75)  BP(mean): --  RR: 19 (13 Oct 2023 05:00) (18 - 20)  SpO2: 100% (13 Oct 2023 05:00) (100% - 100%)    Parameters below as of 13 Oct 2023 05:00  Patient On (Oxygen Delivery Method): room air        Tylenol (Other)      PHYSICAL EXAM:  GENERAL: cachectic, chronically ill-appearing, conversable  HEAD:  Atraumatic, Normocephalic  EYES: EOMI, PERRLA, conjunctiva and sclera clear, teary eyes  ENMT: Moist mucous membranes, Good dentition, No lesions  NECK: Supple, No JVD, Normal thyroid  CHEST/LUNG: Clear to auscultation bilaterally; No rales, rhonchi, wheezing, or rubs  HEART: Regular rate and rhythm; No murmurs, rubs, or gallops  ABDOMEN: Soft, Nontender, Nondistended; Bowel sounds present  EXTREMITIES:  2+ Peripheral Pulses, No clubbing, cyanosis, or edema  LYMPH: No lymphadenopathy noted  SKIN: No rashes or lesions  NERVOUS SYSTEM:  Alert & Oriented X3, Good concentration; Motor Strength 5/5 B/L upper and lower extremities;   PSYCH: Occasionally makes illogical comments marked with psychosis/delusions    Consultant(s) Notes Reviewed:  [x ] YES  [ ] NO  Care Discussed with Consultants/Other Providers [ x] YES  [ ] NO    LABS:                        9.6    5.13  )-----------( 105      ( 13 Oct 2023 05:03 )             29.3     10-13    141  |  102  |  46<H>  ----------------------------<  167<H>  3.9   |  23  |  4.53<H>    Ca    7.4<L>      13 Oct 2023 05:03  Phos  3.4     10-13  Mg     2.00     10-13    TPro  4.7<L>  /  Alb  2.4<L>  /  TBili  0.3  /  DBili  x   /  AST  53<H>  /  ALT  68<H>  /  AlkPhos  225<H>  10-13        CAPILLARY BLOOD GLUCOSE      POCT Blood Glucose.: 177 mg/dL (13 Oct 2023 05:01)              Urinalysis Basic - ( 13 Oct 2023 05:03 )    Color: x / Appearance: x / SG: x / pH: x  Gluc: 167 mg/dL / Ketone: x  / Bili: x / Urobili: x   Blood: x / Protein: x / Nitrite: x   Leuk Esterase: x / RBC: x / WBC x   Sq Epi: x / Non Sq Epi: x / Bacteria: x            I & O Summary:      Microbiology:            RADIOLOGY & ADDITIONAL TESTS:    Imaging Personally Reviewed:  [ ] YES  [ ] NO  gabapentin 100 milliGRAM(s) Oral at bedtime  heparin   Injectable 5000 Unit(s) SubCutaneous every 12 hours  lidocaine   4% Patch 1 Patch Transdermal daily  multivitamin 1 Tablet(s) Oral daily  OLANZapine 2.5 milliGRAM(s) Oral at bedtime  OLANZapine Injectable 2.5 milliGRAM(s) IntraMuscular every 6 hours PRN  oxyCODONE    IR 10 milliGRAM(s) Oral every 4 hours PRN  pancrelipase  (CREON  6,000 Lipase Units) 1 Capsule(s) Oral three times a day with meals  polyethylene glycol 3350 17 Gram(s) Oral daily  senna 2 Tablet(s) Oral at bedtime  thiamine IVPB 500 milliGRAM(s) IV Intermittent three times a day      HEALTH ISSUES - PROBLEM Dx:  ESRD on hemodialysis    Bicytopenia    DVT (deep venous thrombosis)    Chronic systolic congestive heart failure    Need for prophylactic measure    Chronic pancreatitis    Cachexia    Psychosis

## 2023-10-13 NOTE — PROGRESS NOTE ADULT - ATTENDING COMMENTS
63M w/ ESRD on HD, chronic systolic HF (EF 20-25%), alcohol use, h/o HCV (most recent VL negative), chronic pancreatitis (taking chronic opioids), COPD, h/o esophageal stricture (stented 5/22), h/o UE DVT (1/2023, no longer on a/c), recent hospitalization for possible opioid overdose requiring intubation p/w acute on chronic abdominal pain in setting of running out of pain medications at home and PCP reluctant to prescribe more given recent possible OD. Found to have new/worsening psychosis – psychiatry consulted and as part of this workup received CTH, which showed L frontal lobe cortical subacute/chronic infarct    - Psychosis - unclear etiology (no known prior psych diagnoses); appreciate psychiatry involvement; s/p thiamine 500mg IV TID x3 days (10/6-10/9); continue haldol (note patient declined last night); psych helping to determine whether he is stable to discharge. Send syphilis screen. HIV was negative last year. Consider neurology consult re: eval for organic etiology and MRI brain if family does not confirm psychosis is chronic  - Subacute/chronic L frontal lobe cortical infarct - appreciate neurology involvement; start ASA given plt > 70 for multiple days. Declining ILR  - Systolic heart failure - TTE w/ EF 20-25% ddx includes ischemic CMP and toxic 2/2 EtOH - will require outpatient ischemic eval. Continue metoprolol and start losartan  - Chronic pancreatitis - pain service consulted (now signed off) - pain currently well controlled on current regimen - recommend consideration of slow taper vs consider transition to suboxone as outpatient - will coordinate with outpatient provider prior to d/c  - ESRD on HD - renal consulted for HD    Discussed with psychiatry attending

## 2023-10-13 NOTE — BH CONSULTATION LIAISON PROGRESS NOTE - NSBHASSESSMENTFT_PSY_ALL_CORE
63-yo AAM, single, reportedly domiciled with his mother, patient denying any prior psychiatric history (was treated in [facility] 25 years ago with haldol for unknown symptoms/diagnosis and never followed up after release from penitentiary) and history of alcohol abuse (reports quitting "long time ago"), PMH of HCV, Hx of esophageal stricture (S/p stenting in 5/22), Emphysema, chronic pancreatitis, multiple prior medical hospitalizations for confusion, pancreatitis, etc., psych c/s for psychosis.    Patient exhibiting estella psychosis - has delusions, paranoia, referential thoughts, thought insertion, grandiose/hyperreligiousness. Given CKD and HD, concern for lewy body dementia and avoidance of EPS, would begin Olanzapine though must monitor QTc and LFTs which are already somewhat elevated. Needs further psych assessment. Cognitively intact despite worse psychosis than prior hospitalizations. No current signs of etoh withdrawal despite 0 etoh, no current role for CIWA unless such features abruptly begin.     10/9: Patient was calm but continues to exhibit estella psychosis. These features were similar in content to the examination on 10/6, particularly in regard to delusions, referential thoughts, thought insertion, and grandiose ideology. Patient found to have subacute/chronic L frontal lobe cortical infarct on CT head that was new compared to scan in August of 2023. QTc prolonged.    10/10: Patient was calm with no significant alterations in his state of psychosis. These features reflected those observed on previous examinations, with delusions, referential thoughts, though insertion, and grandiose ideology. Repeat EKG on 10/9 demonstrated a QTc of 462 ms. Patient can be started on standing haloperidol 1mg PO qHS at night for psychosis due to minimal interference with platelet function in the setting of consistent thrombocytopenia.     10/12: Patient denied all bedtime medications on 10/11, including haloperidol. Patient was initially calm but became upset during a discussion regarding the use of haloperidol. Repeat EKG on 10/11 demonstrated a QTc of 490 ms. Patient should be offered haloperidol 1mg PO qHS at night for continued psychosis.    10/13: Patient did not take haloperidol on 10/12. Patient was initially calm but became agitated when he suspected the providers did not believe that his delusions were based in reality. Patient should be offered haloperidol 1mg PO qHS at night for continued psychosis. Since it is unclear at times if patient is eating or spitting out food, recommend strict calorie counting to determine if patient is meeting nutritional goals, as patient's appetite and eating habits do not align with body habitus.    Recs:  - no current need for 1:1  - cannot leave AMA needs further psych eval, patient psychotic  - Repeat EKG on 10/9 demonstrated QTc of 462 ms; repeat EKG on 10/11 demonstrated QTc of 490 ms  - Recommend lorazepam 1mg q8h PRN PO/IM/IV for severe agitation - Will not prolong QTc interval  - Discontinue PRN severe agitation Olanzapine 2.5mg q6h PRN PO/IM  - Continue to offer standing haloperidol 1mg PO qHS at night *Please continue to monitor QTc interval and HOLD for QTc >500 ms*  - Recommend strict calorie counting to determine if patient is meeting nutritional goals - Patient's appetite and eating habits do not align with body habitus  - s/p empiric thiamine repletion 500mg IV TID for 3d (completed 10/9)  - CT head with subacute/chronic L frontal lobe cortical infarct, CTA head/neck with no large vessel occlusion or stenosis - patient's mental status much worse than prior hospitalizations  - Psych will follow    Dispo: Pending additional evaluation of patient's ability to care for basic needs outside of the hospital setting

## 2023-10-14 NOTE — PROGRESS NOTE ADULT - SUBJECTIVE AND OBJECTIVE BOX
IMELDA ROBERTSON  63y  Male      Patient is a 63y old  Male who presents with a chief complaint of chronic pancreatitis pain (08 Oct 2023 07:03)      INTERVAL HPI/OVERNIGHT EVENTS:  ***  - Pt declined qHS Haldol  - Afebrile, HDS, on RA, SpO2 >100%  - Patient standing up and in good mood. States that his lunch and dinner were not given yesterday. Continues to state that he "created the people who created [employer]"  - Pt thinks that the recent finding of L frontal lobe cortical infarct is an "attempted stroke" and that it will "be gone the next time we take another scan." Does not want to consider ILR.  - Denies lightheadedness, fever, chills, headache, chest pain, dyspnea, nausea, emesis.      REVIEW OF SYSTEMS:  CONSTITUTIONAL: No fever, weight loss, or fatigue  ENMT: No sinus or throat pain  NECK: No pain or stiffness  RESPIRATORY: No cough, wheezing, chills or hemoptysis; No shortness of breath  CARDIOVASCULAR: No chest pain, palpitations, dizziness, or leg swelling  GASTROINTESTINAL: No abdominal or epigastric pain. No nausea, vomiting, or hematemesis; No diarrhea or constipation. No melena or hematochezia.  GENITOURINARY: No dysuria, frequency, hematuria, or incontinence  NEUROLOGICAL: No headaches,, loss of strength, numbness, or tremors  SKIN: No itching, burning, rashes, or lesions   MUSCULOSKELETAL: No joint pain or swelling; No muscle, back, or extremity pain    FAMILY HISTORY:  FH: HTN (hypertension)    Vital Signs Last 24 Hrs  T(C): 36.4 (13 Oct 2023 05:00), Max: 37 (12 Oct 2023 17:46)  T(F): 97.5 (13 Oct 2023 05:00), Max: 98.6 (12 Oct 2023 17:46)  HR: 88 (13 Oct 2023 05:00) (87 - 98)  BP: 137/75 (13 Oct 2023 05:00) (120/73 - 137/75)  BP(mean): --  RR: 19 (13 Oct 2023 05:00) (18 - 20)  SpO2: 100% (13 Oct 2023 05:00) (100% - 100%)    Parameters below as of 13 Oct 2023 05:00  Patient On (Oxygen Delivery Method): room air        Tylenol (Other)      PHYSICAL EXAM:  GENERAL: cachectic, chronically ill-appearing, conversable  HEAD:  Atraumatic, Normocephalic  EYES: EOMI, PERRLA, conjunctiva and sclera clear, teary eyes  ENMT: Moist mucous membranes, Good dentition, No lesions  NECK: Supple, No JVD, Normal thyroid  CHEST/LUNG: Clear to auscultation bilaterally; No rales, rhonchi, wheezing, or rubs  HEART: Regular rate and rhythm; No murmurs, rubs, or gallops  ABDOMEN: Soft, Nontender, Nondistended; Bowel sounds present  EXTREMITIES:  2+ Peripheral Pulses, No clubbing, cyanosis, or edema  LYMPH: No lymphadenopathy noted  SKIN: No rashes or lesions  NERVOUS SYSTEM:  Alert & Oriented X3, Good concentration; Motor Strength 5/5 B/L upper and lower extremities;   PSYCH: Occasionally makes illogical comments marked with psychosis/delusions    Consultant(s) Notes Reviewed:  [x ] YES  [ ] NO  Care Discussed with Consultants/Other Providers [ x] YES  [ ] NO    LABS:                        9.6    5.13  )-----------( 105      ( 13 Oct 2023 05:03 )             29.3     10-13    141  |  102  |  46<H>  ----------------------------<  167<H>  3.9   |  23  |  4.53<H>    Ca    7.4<L>      13 Oct 2023 05:03  Phos  3.4     10-13  Mg     2.00     10-13    TPro  4.7<L>  /  Alb  2.4<L>  /  TBili  0.3  /  DBili  x   /  AST  53<H>  /  ALT  68<H>  /  AlkPhos  225<H>  10-13        CAPILLARY BLOOD GLUCOSE      POCT Blood Glucose.: 177 mg/dL (13 Oct 2023 05:01)              Urinalysis Basic - ( 13 Oct 2023 05:03 )    Color: x / Appearance: x / SG: x / pH: x  Gluc: 167 mg/dL / Ketone: x  / Bili: x / Urobili: x   Blood: x / Protein: x / Nitrite: x   Leuk Esterase: x / RBC: x / WBC x   Sq Epi: x / Non Sq Epi: x / Bacteria: x            I & O Summary:      Microbiology:            RADIOLOGY & ADDITIONAL TESTS:    Imaging Personally Reviewed:  [ ] YES  [ ] NO  gabapentin 100 milliGRAM(s) Oral at bedtime  heparin   Injectable 5000 Unit(s) SubCutaneous every 12 hours  lidocaine   4% Patch 1 Patch Transdermal daily  multivitamin 1 Tablet(s) Oral daily  OLANZapine 2.5 milliGRAM(s) Oral at bedtime  OLANZapine Injectable 2.5 milliGRAM(s) IntraMuscular every 6 hours PRN  oxyCODONE    IR 10 milliGRAM(s) Oral every 4 hours PRN  pancrelipase  (CREON  6,000 Lipase Units) 1 Capsule(s) Oral three times a day with meals  polyethylene glycol 3350 17 Gram(s) Oral daily  senna 2 Tablet(s) Oral at bedtime  thiamine IVPB 500 milliGRAM(s) IV Intermittent three times a day      HEALTH ISSUES - PROBLEM Dx:  ESRD on hemodialysis    Bicytopenia    DVT (deep venous thrombosis)    Chronic systolic congestive heart failure    Need for prophylactic measure    Chronic pancreatitis    Cachexia    Psychosis             IMELDA ROBERTSON  63y  Male      Patient is a 63y old  Male who presents with a chief complaint of chronic pancreatitis pain (08 Oct 2023 07:03)      INTERVAL HPI/OVERNIGHT EVENTS:  - Afebrile, HDS, on RA, SpO2 >100%  - Denies lightheadedness, fever, chills, headache, chest pain, dyspnea, nausea, emesis.      REVIEW OF SYSTEMS:  CONSTITUTIONAL: No fever, weight loss, or fatigue  ENMT: No sinus or throat pain  NECK: No pain or stiffness  RESPIRATORY: No cough, wheezing, chills or hemoptysis; No shortness of breath  CARDIOVASCULAR: No chest pain, palpitations, dizziness, or leg swelling  GASTROINTESTINAL: No abdominal or epigastric pain. No nausea, vomiting, or hematemesis; No diarrhea or constipation. No melena or hematochezia.  GENITOURINARY: No dysuria, frequency, hematuria, or incontinence  NEUROLOGICAL: No headaches,, loss of strength, numbness, or tremors  SKIN: No itching, burning, rashes, or lesions   MUSCULOSKELETAL: No joint pain or swelling; No muscle, back, or extremity pain    FAMILY HISTORY:  FH: HTN (hypertension)    Vital Signs Last 24 Hrs  T(C): 37 (14 Oct 2023 09:09), Max: 37 (14 Oct 2023 09:09)  T(F): 98.6 (14 Oct 2023 09:09), Max: 98.6 (14 Oct 2023 09:09)  HR: 69 (14 Oct 2023 09:09) (64 - 89)  BP: 128/64 (14 Oct 2023 09:09) (112/64 - 128/81)  BP(mean): --  RR: 18 (14 Oct 2023 09:09) (17 - 18)  SpO2: 100% (14 Oct 2023 09:09) (100% - 100%)    Parameters below as of 14 Oct 2023 09:09  Patient On (Oxygen Delivery Method): room air      Tylenol (Other)      PHYSICAL EXAM:  GENERAL: cachectic, chronically ill-appearing, conversable  HEAD:  Atraumatic, Normocephalic  EYES: EOMI, PERRLA, conjunctiva and sclera clear, teary eyes  ENMT: Moist mucous membranes, Good dentition, No lesions  NECK: Supple, No JVD, Normal thyroid  CHEST/LUNG: Clear to auscultation bilaterally; No rales, rhonchi, wheezing, or rubs  HEART: Regular rate and rhythm; No murmurs, rubs, or gallops  ABDOMEN: Soft, Nontender, Nondistended; Bowel sounds present  EXTREMITIES:  2+ Peripheral Pulses, No clubbing, cyanosis, or edema  LYMPH: No lymphadenopathy noted  SKIN: No rashes or lesions  NERVOUS SYSTEM:  Alert & Oriented X3, Good concentration; Motor Strength 5/5 B/L upper and lower extremities;   PSYCH: Occasionally makes illogical comments marked with psychosis/delusions    Consultant(s) Notes Reviewed:  [x ] YES  [ ] NO  Care Discussed with Consultants/Other Providers [ x] YES  [ ] NO    LABS:                        10.9   4.43  )-----------( 110      ( 14 Oct 2023 05:59 )             34.8     10-14    140  |  101  |  29<H>  ----------------------------<  93  3.9   |  26  |  3.04<H>    Ca    7.6<L>      14 Oct 2023 05:59  Phos  2.7     10-14  Mg     1.90     10-14    TPro  5.5<L>  /  Alb  2.7<L>  /  TBili  0.4  /  DBili  x   /  AST  76<H>  /  ALT  93<H>  /  AlkPhos  260<H>  10-14        CAPILLARY BLOOD GLUCOSE      POCT Blood Glucose.: 139 mg/dL (14 Oct 2023 11:21)              Urinalysis Basic - ( 14 Oct 2023 05:59 )    Color: x / Appearance: x / SG: x / pH: x  Gluc: 93 mg/dL / Ketone: x  / Bili: x / Urobili: x   Blood: x / Protein: x / Nitrite: x   Leuk Esterase: x / RBC: x / WBC x   Sq Epi: x / Non Sq Epi: x / Bacteria: x            I & O Summary:    10-13-23 @ 07:01  -  10-14-23 @ 07:00  --------------------------------------------------------  IN: 400 mL / OUT: 2200 mL / NET: -1800 mL        Microbiology:                RADIOLOGY & ADDITIONAL TESTS:    Imaging Personally Reviewed:  [ ] YES  [ ] NO  gabapentin 100 milliGRAM(s) Oral at bedtime  heparin   Injectable 5000 Unit(s) SubCutaneous every 12 hours  lidocaine   4% Patch 1 Patch Transdermal daily  multivitamin 1 Tablet(s) Oral daily  OLANZapine 2.5 milliGRAM(s) Oral at bedtime  OLANZapine Injectable 2.5 milliGRAM(s) IntraMuscular every 6 hours PRN  oxyCODONE    IR 10 milliGRAM(s) Oral every 4 hours PRN  pancrelipase  (CREON  6,000 Lipase Units) 1 Capsule(s) Oral three times a day with meals  polyethylene glycol 3350 17 Gram(s) Oral daily  senna 2 Tablet(s) Oral at bedtime  thiamine IVPB 500 milliGRAM(s) IV Intermittent three times a day      HEALTH ISSUES - PROBLEM Dx:  ESRD on hemodialysis    Bicytopenia    DVT (deep venous thrombosis)    Chronic systolic congestive heart failure    Need for prophylactic measure    Chronic pancreatitis    Cachexia    Psychosis

## 2023-10-14 NOTE — PROGRESS NOTE ADULT - PROBLEM SELECTOR PLAN 3
-BNP >90296, troponin 131  -Ddx includes ischemic CM, DCM 2/2 EtOH  -Clinically euvolemic  -monitor I/Os, daily weights  -S/p TTE w/ bubble study (10/11) negative for intracardiac shunt, LVEF 20-25% with moderate AI and moderate MR. No LV or RV enlargement.  -Contact outpatient Cardiologist (Dr. Brian Juárez) for follow-up plans for ischemic w/u and GDMT optimization  -Started metop succ 25 mg QD 10/12  -Start Losartan 25 QD today 10/13 -BNP >30518, troponin 131  -Ddx includes ischemic CM, DCM 2/2 EtOH  -Clinically euvolemic  -monitor I/Os, daily weights  -S/p TTE w/ bubble study (10/11) negative for intracardiac shunt, LVEF 20-25% with moderate AI and moderate MR. No LV or RV enlargement.  -Contact outpatient Cardiologist (Dr. Brian Juárez) for follow-up plans for ischemic w/u and GDMT optimization (and consider inpatient ischemic w/u)  -C/w metop succinate 25 mg QD  -C/w Losartan 25 QD

## 2023-10-14 NOTE — PROGRESS NOTE ADULT - ATTENDING COMMENTS
63M w/ ESRD on HD, chronic systolic HF (EF 20-25%), alcohol use, h/o HCV (most recent VL negative), chronic pancreatitis (taking chronic opioids), COPD, h/o esophageal stricture (stented 5/22), h/o UE DVT (1/2023, no longer on a/c), recent hospitalization for possible opioid overdose requiring intubation p/w acute on chronic abdominal pain. Found to have new/worsening psychosis – psychiatry consulted and as part of this workup received CTH, which showed L frontal lobe cortical subacute/chronic infarct    - Psychosis - unclear etiology (no known prior psych diagnoses); appreciate psychiatry involvement; s/p thiamine 500mg IV TID x3 days (10/6-10/9); continue haldol (note patient declined last night); Awaiting collateral info from family regarding the chronicity of the psych issues    - Subacute/chronic L frontal lobe cortical infarct - appreciate neurology involvement; start ASA given plt > 70 for multiple days. Declining ILR    - Chronic Systolic heart failure - TTE w/ EF 20-25% ddx includes ischemic CMP and toxic 2/2 EtOH -  Will have cardiology see pt inpt since there is no dispo planning at this time. Continue metoprolol and start losartan    - Chronic pancreatitis - pain service consulted (now signed off) - pain currently well controlled on current regimen - recommend consideration of slow taper vs consider transition to suboxone as outpatient - will coordinate with outpatient provider prior to d/c    - ESRD on HD - c/w HD per renal recs

## 2023-10-14 NOTE — PROGRESS NOTE ADULT - PROBLEM SELECTOR PLAN 1
- Psych c/s appreciated: pt with estella psychosis; cannot leave AMA but no current need for 1:1  - Current Psych recs:  -- standing haloperidol 1mg PO qHS *continue to monitor QTc interval and HOLD for QTc >500 ms*  -- lorazepam 1mg q8h PRN PO/IM/IV for severe agitation per Psych  - Daily ECG for QTc evaluation (QTc on 10/11: 490 ms)  - Not a candidate for Psychiatry admission while on HD  - Need to r/o organic causes for worsening psychosis per Psych - f/u Neuro regarding checking RPR, HIV, malignancy work up, MRI brain

## 2023-10-14 NOTE — PROGRESS NOTE ADULT - PROBLEM SELECTOR PLAN 2
- CTH with subacute/chronic L frontal lobe cortical infarct (new compared to Aug 2023)  - Patient to remain on tele while admitted per Neuro  - S/p CTA H/N 10/11 with no new progression; subacute-chronic L frontal lobe cortical infarct, no large vessel occlusion or major stenosis.  - TTE w/ bubble study 10/11 negative for intracardiac shunt  - Neurology following -- appreciate recs  - HbA1c 4.1% (10/12)  - Start atorvastatin 40 mg -- Lipid panel WNL  - Plan to start ASA81 once stable after IR bx procedure today 10/13  - F/u with Neuro regarding workup for a possible organic cause for worsening psychosis  - Pt to follow up with Dr. Saenz after discharge:  [address] [place] [address]. Call [phone]. - CTH with subacute/chronic L frontal lobe cortical infarct (new compared to Aug 2023)  - Patient to remain on tele while admitted per Neuro  - S/p CTA H/N 10/11 with no new progression; subacute-chronic L frontal lobe cortical infarct, no large vessel occlusion or major stenosis.  - TTE w/ bubble study 10/11 negative for intracardiac shunt  - Neurology following -- appreciate recs  - HbA1c 4.1% (10/12)  - Start atorvastatin 40 mg -- Lipid panel WNL  - Plan to start ASA81 once stable on 10/14  - F/u with Neuro regarding workup for a possible organic cause for worsening psychosis  - Pt to follow up with Dr. Saenz after discharge:  [address] [place] [address]. Call [phone]. - CTH with subacute/chronic L frontal lobe cortical infarct (new compared to Aug 2023)  - Patient to remain on tele while admitted per Neuro  - S/p CTA H/N 10/11 with no new progression; subacute-chronic L frontal lobe cortical infarct, no large vessel occlusion or major stenosis.  - TTE w/ bubble study 10/11 negative for intracardiac shunt  - Neurology following -- appreciate recs  - C/w atorvastatin 40 mg   - Start ASA81 on 10/14  - F/u with Neuro regarding workup for a possible organic cause for worsening psychosis  - Pt to follow up with Dr. Saenz after discharge:  [address] [place] [address]. Call [phone].

## 2023-10-14 NOTE — PROGRESS NOTE ADULT - PROBLEM SELECTOR PLAN 6
-CT A/P 10/5: Sequela of chronic pancreatitis with mild common bile duct dilatation unchanged. The prior peripancreatic foci of air have resolved.  -pain management c/s appreciated: gabapentin 100 bedtime; oxy 10 mg PO q4 prn; lidocaine patch x5d; outpt f/u  -Chronic pain recommends considering gradually weaning off opioids as tolerated (oxy dose reduction by 10% weekly, and continue f/u with outpatient Pain Medicine) and maximizing non-opioid analgesia as appropriate, as pain stable and patient does not have definite organic cause that opioids are actively treating -- appreciate recs  -Could consider Suboxone, patient previously declined  -c/w pancrelipase  -recent episodes of hypoglycemia may be attributed to his chronic pancreatitis endocrine dysfunction  -F/u with outpatient PCP (Dr. Hector Kelly) regarding pain regimen for discharge

## 2023-10-15 NOTE — PROGRESS NOTE ADULT - PROBLEM SELECTOR PLAN 1
- Psych c/s appreciated: pt with estella psychosis; cannot leave AMA but no current need for 1:1  - Current Psych recs:  -- standing haloperidol 1mg PO qHS *continue to monitor QTc interval and HOLD for QTc >500 ms*  -- lorazepam 1mg q8h PRN PO/IM/IV for severe agitation per Psych  - Daily ECG for QTc evaluation (QTc on 10/11: 490 ms)  - Not a candidate for Psychiatry admission while on HD  - Need to r/o organic causes for worsening psychosis per Psych - f/u Neuro regarding checking RPR, HIV, malignancy work up, MRI brain - Psych c/s appreciated: pt with estella psychosis; cannot leave AMA but no current need for 1:1  - Current Psych recs:  -- standing haloperidol 1mg PO qHS *continue to monitor QTc interval and HOLD for QTc >500 ms*  -- lorazepam 1mg q8h PRN PO/IM/IV for severe agitation per Psych  - Daily ECG for QTc evaluation (QTc on 10/11: 490 ms)  - Not a candidate for Psychiatry admission while on HD  - Need to r/o organic causes for worsening psychosis per Psych - f/u Neuro regarding checking RPR, HIV, malignancy work up, MRI brain  - waiting on psych follow up

## 2023-10-15 NOTE — PROGRESS NOTE ADULT - PROBLEM SELECTOR PLAN 2
- CTH with subacute/chronic L frontal lobe cortical infarct (new compared to Aug 2023)  - Patient to remain on tele while admitted per Neuro  - S/p CTA H/N 10/11 with no new progression; subacute-chronic L frontal lobe cortical infarct, no large vessel occlusion or major stenosis.  - TTE w/ bubble study 10/11 negative for intracardiac shunt  - Neurology following -- appreciate recs  - C/w atorvastatin 40 mg   - Start ASA81 on 10/14  - F/u with Neuro regarding workup for a possible organic cause for worsening psychosis  - Pt to follow up with Dr. Saenz after discharge:  [address] [place] [address]. Call [phone].

## 2023-10-15 NOTE — PROGRESS NOTE ADULT - ATTENDING COMMENTS
63M w/ ESRD on HD, chronic systolic HF (EF 20-25%), alcohol use, h/o HCV (most recent VL negative), chronic pancreatitis (taking chronic opioids), COPD, h/o esophageal stricture (stented 5/22), h/o UE DVT (1/2023, no longer on a/c), recent hospitalization for possible opioid overdose requiring intubation p/w acute on chronic abdominal pain. Found to have new/worsening psychosis – psychiatry consulted and as part of this workup received CTH, which showed L frontal lobe cortical subacute/chronic infarct    - Psychosis - unclear etiology (no known prior psych diagnoses); appreciate psychiatry involvement; s/p thiamine 500mg IV TID x3 days (10/6-10/9); continue haldol (note patient declined last night); Awaiting collateral info from family regarding the chronicity of the psych issues    - Subacute/chronic L frontal lobe cortical infarct - appreciate neurology involvement; start ASA given plt > 70 for multiple days. Declining ILR    - Chronic Systolic heart failure - TTE w/ EF 20-25% ddx includes ischemic CMP and toxic 2/2 EtOH -  Will have cardiology see pt inpt since there is no dispo planning at this time. Continue metoprolol and start losartan    - Chronic pancreatitis - pain service consulted (now signed off) - pain currently well controlled on current regimen - recommend consideration of slow taper vs consider transition to suboxone as outpatient - will coordinate with outpatient provider prior to d/c    - ESRD on HD - c/w HD per renal recs.

## 2023-10-15 NOTE — PROGRESS NOTE ADULT - PROBLEM SELECTOR PLAN 3
-BNP >46375, troponin 131  -Ddx includes ischemic CM, DCM 2/2 EtOH  -Clinically euvolemic  -monitor I/Os, daily weights  -S/p TTE w/ bubble study (10/11) negative for intracardiac shunt, LVEF 20-25% with moderate AI and moderate MR. No LV or RV enlargement.  -Contact outpatient Cardiologist (Dr. Brian Juárez) for follow-up plans for ischemic w/u and GDMT optimization (and consider inpatient ischemic w/u)  -C/w metop succinate 25 mg QD  -C/w Losartan 25 QD -BNP >98116, troponin 131  -Ddx includes ischemic CM, DCM 2/2 EtOH  -Clinically euvolemic  -monitor I/Os, daily weights  -S/p TTE w/ bubble study (10/11) negative for intracardiac shunt, LVEF 20-25% with moderate AI and moderate MR. No LV or RV enlargement.  -Contact outpatient Cardiologist (Dr. Brian Juárez) for follow-up plans for ischemic w/u and GDMT optimization (and consider inpatient ischemic w/u)  -C/w metop succinate 25 mg QD  -C/w Losartan 25 QD  - consider inpatient cards consult

## 2023-10-15 NOTE — PROGRESS NOTE ADULT - SUBJECTIVE AND OBJECTIVE BOX
IMELDA ROBERTSON  63y  Male      Patient is a 63y old  Male who presents with a chief complaint of chronic pancreatitis pain (08 Oct 2023 07:03)      INTERVAL HPI/OVERNIGHT EVENTS:  - Afebrile, HDS, on RA, SpO2 >100%  - Denies lightheadedness, fever, chills, headache, chest pain, dyspnea, nausea, emesis.      REVIEW OF SYSTEMS:  CONSTITUTIONAL: No fever, weight loss, or fatigue  ENMT: No sinus or throat pain  NECK: No pain or stiffness  RESPIRATORY: No cough, wheezing, chills or hemoptysis; No shortness of breath  CARDIOVASCULAR: No chest pain, palpitations, dizziness, or leg swelling  GASTROINTESTINAL: No abdominal or epigastric pain. No nausea, vomiting, or hematemesis; No diarrhea or constipation. No melena or hematochezia.  GENITOURINARY: No dysuria, frequency, hematuria, or incontinence  NEUROLOGICAL: No headaches,, loss of strength, numbness, or tremors  SKIN: No itching, burning, rashes, or lesions   MUSCULOSKELETAL: No joint pain or swelling; No muscle, back, or extremity pain    FAMILY HISTORY:  FH: HTN (hypertension)    Vital Signs Last 24 Hrs  T(C): 37 (14 Oct 2023 09:09), Max: 37 (14 Oct 2023 09:09)  T(F): 98.6 (14 Oct 2023 09:09), Max: 98.6 (14 Oct 2023 09:09)  HR: 69 (14 Oct 2023 09:09) (64 - 89)  BP: 128/64 (14 Oct 2023 09:09) (112/64 - 128/81)  BP(mean): --  RR: 18 (14 Oct 2023 09:09) (17 - 18)  SpO2: 100% (14 Oct 2023 09:09) (100% - 100%)    Parameters below as of 14 Oct 2023 09:09  Patient On (Oxygen Delivery Method): room air      Tylenol (Other)      PHYSICAL EXAM:  GENERAL: cachectic, chronically ill-appearing, conversable  HEAD:  Atraumatic, Normocephalic  EYES: EOMI, PERRLA, conjunctiva and sclera clear, teary eyes  ENMT: Moist mucous membranes, Good dentition, No lesions  NECK: Supple, No JVD, Normal thyroid  CHEST/LUNG: Clear to auscultation bilaterally; No rales, rhonchi, wheezing, or rubs  HEART: Regular rate and rhythm; No murmurs, rubs, or gallops  ABDOMEN: Soft, Nontender, Nondistended; Bowel sounds present  EXTREMITIES:  2+ Peripheral Pulses, No clubbing, cyanosis, or edema  LYMPH: No lymphadenopathy noted  SKIN: No rashes or lesions  NERVOUS SYSTEM:  Alert & Oriented X3, Good concentration; Motor Strength 5/5 B/L upper and lower extremities;   PSYCH: Occasionally makes illogical comments marked with psychosis/delusions    Consultant(s) Notes Reviewed:  [x ] YES  [ ] NO  Care Discussed with Consultants/Other Providers [ x] YES  [ ] NO    LABS:                        10.9   4.43  )-----------( 110      ( 14 Oct 2023 05:59 )             34.8     10-14    140  |  101  |  29<H>  ----------------------------<  93  3.9   |  26  |  3.04<H>    Ca    7.6<L>      14 Oct 2023 05:59  Phos  2.7     10-14  Mg     1.90     10-14    TPro  5.5<L>  /  Alb  2.7<L>  /  TBili  0.4  /  DBili  x   /  AST  76<H>  /  ALT  93<H>  /  AlkPhos  260<H>  10-14        CAPILLARY BLOOD GLUCOSE      POCT Blood Glucose.: 139 mg/dL (14 Oct 2023 11:21)              Urinalysis Basic - ( 14 Oct 2023 05:59 )    Color: x / Appearance: x / SG: x / pH: x  Gluc: 93 mg/dL / Ketone: x  / Bili: x / Urobili: x   Blood: x / Protein: x / Nitrite: x   Leuk Esterase: x / RBC: x / WBC x   Sq Epi: x / Non Sq Epi: x / Bacteria: x            I & O Summary:    10-13-23 @ 07:01  -  10-14-23 @ 07:00  --------------------------------------------------------  IN: 400 mL / OUT: 2200 mL / NET: -1800 mL        Microbiology:                RADIOLOGY & ADDITIONAL TESTS:    Imaging Personally Reviewed:  [ ] YES  [ ] NO  gabapentin 100 milliGRAM(s) Oral at bedtime  heparin   Injectable 5000 Unit(s) SubCutaneous every 12 hours  lidocaine   4% Patch 1 Patch Transdermal daily  multivitamin 1 Tablet(s) Oral daily  OLANZapine 2.5 milliGRAM(s) Oral at bedtime  OLANZapine Injectable 2.5 milliGRAM(s) IntraMuscular every 6 hours PRN  oxyCODONE    IR 10 milliGRAM(s) Oral every 4 hours PRN  pancrelipase  (CREON  6,000 Lipase Units) 1 Capsule(s) Oral three times a day with meals  polyethylene glycol 3350 17 Gram(s) Oral daily  senna 2 Tablet(s) Oral at bedtime  thiamine IVPB 500 milliGRAM(s) IV Intermittent three times a day      HEALTH ISSUES - PROBLEM Dx:  ESRD on hemodialysis    Bicytopenia    DVT (deep venous thrombosis)    Chronic systolic congestive heart failure    Need for prophylactic measure    Chronic pancreatitis    Cachexia    Psychosis             IMELDA ROBERTSON  63y  Male      Patient is a 63y old  Male who presents with a chief complaint of chronic pancreatitis pain (08 Oct 2023 07:03)      INTERVAL HPI/OVERNIGHT EVENTS:  - Afebrile, HDS, on RA, SpO2 >100%  - no acute symptoms reported      FAMILY HISTORY:  FH: HTN (hypertension)    Vital Signs Last 24 Hrs  T(C): 37 (14 Oct 2023 09:09), Max: 37 (14 Oct 2023 09:09)  T(F): 98.6 (14 Oct 2023 09:09), Max: 98.6 (14 Oct 2023 09:09)  HR: 69 (14 Oct 2023 09:09) (64 - 89)  BP: 128/64 (14 Oct 2023 09:09) (112/64 - 128/81)  BP(mean): --  RR: 18 (14 Oct 2023 09:09) (17 - 18)  SpO2: 100% (14 Oct 2023 09:09) (100% - 100%)    Parameters below as of 14 Oct 2023 09:09  Patient On (Oxygen Delivery Method): room air      Tylenol (Other)      PHYSICAL EXAM:  GENERAL: cachectic, chronically ill-appearing, conversable  HEAD:  Atraumatic, Normocephalic  EYES: EOMI, PERRLA, conjunctiva and sclera clear, teary eyes  ENMT: Moist mucous membranes, Good dentition, No lesions  NECK: Supple, No JVD, Normal thyroid  CHEST/LUNG: Clear to auscultation bilaterally; No rales, rhonchi, wheezing, or rubs  HEART: Regular rate and rhythm; No murmurs, rubs, or gallops  ABDOMEN: Soft, Nontender, Nondistended; Bowel sounds present  EXTREMITIES:  2+ Peripheral Pulses, No clubbing, cyanosis, or edema  LYMPH: No lymphadenopathy noted  SKIN: No rashes or lesions  NERVOUS SYSTEM:  Alert & Oriented X3, Good concentration; Motor Strength 5/5 B/L upper and lower extremities;   PSYCH: Occasionally makes illogical comments marked with psychosis/delusions    Consultant(s) Notes Reviewed:  [x ] YES  [ ] NO  Care Discussed with Consultants/Other Providers [ x] YES  [ ] NO    LABS:                        10.9   4.43  )-----------( 110      ( 14 Oct 2023 05:59 )             34.8     10-14    140  |  101  |  29<H>  ----------------------------<  93  3.9   |  26  |  3.04<H>    Ca    7.6<L>      14 Oct 2023 05:59  Phos  2.7     10-14  Mg     1.90     10-14    TPro  5.5<L>  /  Alb  2.7<L>  /  TBili  0.4  /  DBili  x   /  AST  76<H>  /  ALT  93<H>  /  AlkPhos  260<H>  10-14        CAPILLARY BLOOD GLUCOSE      POCT Blood Glucose.: 139 mg/dL (14 Oct 2023 11:21)              Urinalysis Basic - ( 14 Oct 2023 05:59 )    Color: x / Appearance: x / SG: x / pH: x  Gluc: 93 mg/dL / Ketone: x  / Bili: x / Urobili: x   Blood: x / Protein: x / Nitrite: x   Leuk Esterase: x / RBC: x / WBC x   Sq Epi: x / Non Sq Epi: x / Bacteria: x            I & O Summary:    10-13-23 @ 07:01  -  10-14-23 @ 07:00  --------------------------------------------------------  IN: 400 mL / OUT: 2200 mL / NET: -1800 mL        Microbiology:                RADIOLOGY & ADDITIONAL TESTS:    Imaging Personally Reviewed:  [ ] YES  [ ] NO  gabapentin 100 milliGRAM(s) Oral at bedtime  heparin   Injectable 5000 Unit(s) SubCutaneous every 12 hours  lidocaine   4% Patch 1 Patch Transdermal daily  multivitamin 1 Tablet(s) Oral daily  OLANZapine 2.5 milliGRAM(s) Oral at bedtime  OLANZapine Injectable 2.5 milliGRAM(s) IntraMuscular every 6 hours PRN  oxyCODONE    IR 10 milliGRAM(s) Oral every 4 hours PRN  pancrelipase  (CREON  6,000 Lipase Units) 1 Capsule(s) Oral three times a day with meals  polyethylene glycol 3350 17 Gram(s) Oral daily  senna 2 Tablet(s) Oral at bedtime  thiamine IVPB 500 milliGRAM(s) IV Intermittent three times a day      HEALTH ISSUES - PROBLEM Dx:  ESRD on hemodialysis    Bicytopenia    DVT (deep venous thrombosis)    Chronic systolic congestive heart failure    Need for prophylactic measure    Chronic pancreatitis    Cachexia    Psychosis

## 2023-10-16 NOTE — PROGRESS NOTE ADULT - ATTENDING SUPERVISION STATEMENT
Fellow
Resident

## 2023-10-16 NOTE — BH CONSULTATION LIAISON PROGRESS NOTE - NSBHASSESSMENTFT_PSY_ALL_CORE
63-yo AAM, single, reportedly domiciled with his mother, patient denying any prior psychiatric history (was treated in [facility] 25 years ago with haldol for unknown symptoms/diagnosis and never followed up after release from [facility]) and history of alcohol abuse (reports quitting "long time ago"), PMH of HCV, Hx of esophageal stricture (S/p stenting in 5/22), Emphysema, chronic pancreatitis, multiple prior medical hospitalizations for confusion, pancreatitis, etc., psych c/s for psychosis.    Patient exhibiting estella psychosis - has delusions, paranoia, referential thoughts, thought insertion, grandiose/hyperreligiousness. Given CKD and HD, concern for lewy body dementia and avoidance of EPS, would begin Olanzapine though must monitor QTc and LFTs which are already somewhat elevated. Needs further psych assessment. Cognitively intact despite worse psychosis than prior hospitalizations. No current signs of etoh withdrawal despite 0 etoh, no current role for CIWA unless such features abruptly begin.     10/9: Patient was calm but continues to exhibit estella psychosis. These features were similar in content to the examination on 10/6, particularly in regard to delusions, referential thoughts, thought insertion, and grandiose ideology. Patient found to have subacute/chronic L frontal lobe cortical infarct on CT head that was new compared to scan in August of 2023. QTc prolonged.    10/10: Patient was calm with no significant alterations in his state of psychosis. These features reflected those observed on previous examinations, with delusions, referential thoughts, though insertion, and grandiose ideology. Repeat EKG on 10/9 demonstrated a QTc of 462 ms. Patient can be started on standing haloperidol 1mg PO qHS at night for psychosis due to minimal interference with platelet function in the setting of consistent thrombocytopenia.     10/12: Patient denied all bedtime medications on 10/11, including haloperidol. Patient was initially calm but became upset during a discussion regarding the use of haloperidol. Repeat EKG on 10/11 demonstrated a QTc of 490 ms. Patient should be offered haloperidol 1mg PO qHS at night for continued psychosis.    10/13: Patient did not take haloperidol on 10/12. Patient was initially calm but became agitated when he suspected the providers did not believe that his delusions were based in reality. Patient should be offered haloperidol 1mg PO qHS at night for continued psychosis. Since it is unclear at times if patient is eating or spitting out food, recommend strict calorie counting to determine if patient is meeting nutritional goals, as patient's appetite and eating habits do not align with body habitus.    10/16: Patient continues to refuse haloperidol and other medications. Patient continues to nidia food in bed and cabinets. Per chart, this behavior has been associated with food insecurity at home, but other team members report that this behavior has occurred at home as well. Recommend limiting patient's meals to normal feeding times if this does not cause the patient to become agitated.     Recs:  - no current need for 1:1  - cannot leave AMA needs further psych eval, patient psychotic  - Repeat EKG on 10/9 demonstrated QTc of 462 ms; repeat EKG on 10/11 demonstrated QTc of 490 ms  - Recommend lorazepam 1mg q8h PRN PO/IM/IV for severe agitation - Will not prolong QTc interval  - Discontinue PRN severe agitation Olanzapine 2.5mg q6h PRN PO/IM  - Continue to offer standing haloperidol 1mg PO qHS at night *Please continue to monitor QTc interval and HOLD for QTc >500 ms*  - Recommend strict calorie counting to determine if patient is meeting nutritional goals - Patient's appetite and eating habits do not align with body habitus  - Recommend limiting patient's meals to normal feeding times if patient does not become excessively agitated   - s/p empiric thiamine repletion 500mg IV TID for 3d (completed 10/9)  - CT head with subacute/chronic L frontal lobe cortical infarct, CTA head/neck with no large vessel occlusion or stenosis - patient's mental status much worse than prior hospitalizations  - Psych will follow    Dispo: Pending additional evaluation of patient's ability to care for basic needs outside of the hospital setting

## 2023-10-16 NOTE — PROGRESS NOTE ADULT - SUBJECTIVE AND OBJECTIVE BOX
Internal Medicine   Mayuri Kayleigh | PGY-2    OVERNIGHT EVENTS: No acute overnight events.    SUBJECTIVE:       MEDICATIONS  (STANDING):  aspirin enteric coated 81 milliGRAM(s) Oral daily  atorvastatin 40 milliGRAM(s) Oral at bedtime  chlorhexidine 2% Cloths 1 Application(s) Topical daily  dextrose 5%. 1000 milliLiter(s) (50 mL/Hr) IV Continuous <Continuous>  dextrose 5%. 1000 milliLiter(s) (100 mL/Hr) IV Continuous <Continuous>  dextrose 50% Injectable 25 Gram(s) IV Push once  dextrose 50% Injectable 12.5 Gram(s) IV Push once  dextrose 50% Injectable 25 Gram(s) IV Push once  gabapentin 100 milliGRAM(s) Oral at bedtime  glucagon  Injectable 1 milliGRAM(s) IntraMuscular once  haloperidol     Tablet 1 milliGRAM(s) Oral at bedtime  heparin   Injectable 5000 Unit(s) SubCutaneous every 12 hours  losartan 25 milliGRAM(s) Oral daily  metoprolol succinate ER 25 milliGRAM(s) Oral daily  multivitamin 1 Tablet(s) Oral daily  mupirocin 2% Ointment 1 Application(s) Both Nostrils two times a day  pancrelipase  (CREON  6,000 Lipase Units) 1 Capsule(s) Oral three times a day with meals  polyethylene glycol 3350 17 Gram(s) Oral daily  senna 2 Tablet(s) Oral at bedtime  vitamin E 200 International Unit(s) Oral daily    MEDICATIONS  (PRN):  dextrose Oral Gel 15 Gram(s) Oral once PRN Blood Glucose LESS THAN 70 milliGRAM(s)/deciliter  LORazepam   Injectable 1 milliGRAM(s) IV Push every 8 hours PRN Agitation  LORazepam   Injectable 1 milliGRAM(s) IV Push once PRN Anxiety  oxyCODONE    IR 10 milliGRAM(s) Oral every 4 hours PRN Mod-severe pain        T(F): 97.5 (10-16-23 @ 06:40), Max: 98.3 (10-15-23 @ 22:00)  HR: 79 (10-16-23 @ 06:40) (78 - 89)  BP: 122/99 (10-16-23 @ 06:40) (118/71 - 135/79)  BP(mean): --  RR: 18 (10-16-23 @ 06:40) (18 - 19)  SpO2: 98% (10-15-23 @ 23:35) (98% - 100%)    PHYSICAL EXAM:     GENERAL: NAD, lying in bed comfortably  HEAD:  Atraumatic, Normocephalic  EYES: EOMI, PERRLA, conjunctiva and sclera clear, no nystagmus noted  ENT: Moist mucous membranes,   NECK: Supple, No JVD, trachea midline  CHEST/LUNG: Clear to auscultation bilaterally; No rales, rhonchi, wheezing, or rubs. Unlabored respirations  HEART: Regular rate and rhythm; No murmurs, rubs, or gallops, normal S1/S2  ABDOMEN: normal bowel sounds; Soft, nontender, nondistended, no organomegaly   EXTREMITIES:  2+ Peripheral Pulses, brisk capillary refill. No clubbing, cyanosis, or edema  MSK: No gross deformities noted   Neurological:  A&Ox3, no focal deficits   SKIN: No rashes or lesions  PSYCH: Normal mood, affect     TELEMETRY:    LABS:                        10.5   5.98  )-----------( 155      ( 16 Oct 2023 04:50 )             32.4     10-15    142  |  102  |  47<H>  ----------------------------<  81  4.6   |  24  |  4.04<H>    Ca    7.6<L>      15 Oct 2023 11:04  Phos  3.9     10-15  Mg     2.00     10-15              Creatinine Trend: 4.04<--, 3.04<--, 4.53<--, 3.72<--, 4.97<--, 3.86<--  I&O's Summary    BNP    RADIOLOGY & ADDITIONAL STUDIES:             Internal Medicine   Mayuri Ro | PGY-2    OVERNIGHT EVENTS: No acute overnight events.    SUBJECTIVE: Patient was seen and examined at bedside this morning. Pt refused night time dose haldol. Denies any nausea/vomiting/diarrhea, headache, shortness of breath, abdominal pain or chest pain/palpitations. Patient responding appropriately to questions and able to make needs known. Vital signs/imaging/telemetry events reviewed.       MEDICATIONS  (STANDING):  aspirin enteric coated 81 milliGRAM(s) Oral daily  atorvastatin 40 milliGRAM(s) Oral at bedtime  chlorhexidine 2% Cloths 1 Application(s) Topical daily  dextrose 5%. 1000 milliLiter(s) (50 mL/Hr) IV Continuous <Continuous>  dextrose 5%. 1000 milliLiter(s) (100 mL/Hr) IV Continuous <Continuous>  dextrose 50% Injectable 25 Gram(s) IV Push once  dextrose 50% Injectable 12.5 Gram(s) IV Push once  dextrose 50% Injectable 25 Gram(s) IV Push once  gabapentin 100 milliGRAM(s) Oral at bedtime  glucagon  Injectable 1 milliGRAM(s) IntraMuscular once  haloperidol     Tablet 1 milliGRAM(s) Oral at bedtime  heparin   Injectable 5000 Unit(s) SubCutaneous every 12 hours  losartan 25 milliGRAM(s) Oral daily  metoprolol succinate ER 25 milliGRAM(s) Oral daily  multivitamin 1 Tablet(s) Oral daily  mupirocin 2% Ointment 1 Application(s) Both Nostrils two times a day  pancrelipase  (CREON  6,000 Lipase Units) 1 Capsule(s) Oral three times a day with meals  polyethylene glycol 3350 17 Gram(s) Oral daily  senna 2 Tablet(s) Oral at bedtime  vitamin E 200 International Unit(s) Oral daily    MEDICATIONS  (PRN):  dextrose Oral Gel 15 Gram(s) Oral once PRN Blood Glucose LESS THAN 70 milliGRAM(s)/deciliter  LORazepam   Injectable 1 milliGRAM(s) IV Push every 8 hours PRN Agitation  LORazepam   Injectable 1 milliGRAM(s) IV Push once PRN Anxiety  oxyCODONE    IR 10 milliGRAM(s) Oral every 4 hours PRN Mod-severe pain        T(F): 97.5 (10-16-23 @ 06:40), Max: 98.3 (10-15-23 @ 22:00)  HR: 79 (10-16-23 @ 06:40) (78 - 89)  BP: 122/99 (10-16-23 @ 06:40) (118/71 - 135/79)  BP(mean): --  RR: 18 (10-16-23 @ 06:40) (18 - 19)  SpO2: 98% (10-15-23 @ 23:35) (98% - 100%)    PHYSICAL EXAM:     GENERAL: NAD, lying in bed comfortably  HEAD:  Atraumatic, Normocephalic  EYES: EOMI, PERRLA, conjunctiva and sclera clear, no nystagmus noted  ENT: Moist mucous membranes,   NECK: Supple, No JVD, trachea midline  CHEST/LUNG: Clear to auscultation bilaterally; No rales, rhonchi, wheezing, or rubs. Unlabored respirations  HEART: Regular rate and rhythm; No murmurs, rubs, or gallops, normal S1/S2  ABDOMEN: normal bowel sounds; Soft, nontender, nondistended, no organomegaly   EXTREMITIES:  2+ Peripheral Pulses, brisk capillary refill. No clubbing, cyanosis, or edema  MSK: No gross deformities noted   Neurological:  A&Ox3, no focal deficits   SKIN: No rashes or lesions  PSYCH: Normal mood, affect     TELEMETRY:    LABS:                        10.5   5.98  )-----------( 155      ( 16 Oct 2023 04:50 )             32.4     10-15    142  |  102  |  47<H>  ----------------------------<  81  4.6   |  24  |  4.04<H>    Ca    7.6<L>      15 Oct 2023 11:04  Phos  3.9     10-15  Mg     2.00     10-15              Creatinine Trend: 4.04<--, 3.04<--, 4.53<--, 3.72<--, 4.97<--, 3.86<--  I&O's Summary    BNP    RADIOLOGY & ADDITIONAL STUDIES:             Internal Medicine   Mayuri Ro | PGY-2    OVERNIGHT EVENTS: No acute overnight events.    SUBJECTIVE: Patient was seen and examined at bedside this morning. Pt refused night time dose haldol. Pt reports continued diffuse abdominal pain. Denies any nausea/vomiting/diarrhea, headache, shortness of breath, or chest pain/palpitations. Patient responding appropriately to questions and able to make needs known. Vital signs/imaging/telemetry events reviewed.       MEDICATIONS  (STANDING):  aspirin enteric coated 81 milliGRAM(s) Oral daily  atorvastatin 40 milliGRAM(s) Oral at bedtime  chlorhexidine 2% Cloths 1 Application(s) Topical daily  dextrose 5%. 1000 milliLiter(s) (50 mL/Hr) IV Continuous <Continuous>  dextrose 5%. 1000 milliLiter(s) (100 mL/Hr) IV Continuous <Continuous>  dextrose 50% Injectable 25 Gram(s) IV Push once  dextrose 50% Injectable 12.5 Gram(s) IV Push once  dextrose 50% Injectable 25 Gram(s) IV Push once  gabapentin 100 milliGRAM(s) Oral at bedtime  glucagon  Injectable 1 milliGRAM(s) IntraMuscular once  haloperidol     Tablet 1 milliGRAM(s) Oral at bedtime  heparin   Injectable 5000 Unit(s) SubCutaneous every 12 hours  losartan 25 milliGRAM(s) Oral daily  metoprolol succinate ER 25 milliGRAM(s) Oral daily  multivitamin 1 Tablet(s) Oral daily  mupirocin 2% Ointment 1 Application(s) Both Nostrils two times a day  pancrelipase  (CREON  6,000 Lipase Units) 1 Capsule(s) Oral three times a day with meals  polyethylene glycol 3350 17 Gram(s) Oral daily  senna 2 Tablet(s) Oral at bedtime  vitamin E 200 International Unit(s) Oral daily    MEDICATIONS  (PRN):  dextrose Oral Gel 15 Gram(s) Oral once PRN Blood Glucose LESS THAN 70 milliGRAM(s)/deciliter  LORazepam   Injectable 1 milliGRAM(s) IV Push every 8 hours PRN Agitation  LORazepam   Injectable 1 milliGRAM(s) IV Push once PRN Anxiety  oxyCODONE    IR 10 milliGRAM(s) Oral every 4 hours PRN Mod-severe pain        T(F): 97.5 (10-16-23 @ 06:40), Max: 98.3 (10-15-23 @ 22:00)  HR: 79 (10-16-23 @ 06:40) (78 - 89)  BP: 122/99 (10-16-23 @ 06:40) (118/71 - 135/79)  BP(mean): --  RR: 18 (10-16-23 @ 06:40) (18 - 19)  SpO2: 98% (10-15-23 @ 23:35) (98% - 100%)    PHYSICAL EXAM:     GENERAL: +cachectic; +chronically ill-appearing; NAD, lying in bed comfortably  HEAD:  Atraumatic, Normocephalic  EYES: EOMI, PERRLA, conjunctiva and sclera clear, no nystagmus noted  ENT: Moist mucous membranes,   NECK: Supple, No JVD, trachea midline  CHEST/LUNG: Clear to auscultation bilaterally; No rales, rhonchi, wheezing, or rubs. Unlabored respirations  HEART: Regular rate and rhythm; No murmurs, rubs, or gallops, normal S1/S2  ABDOMEN: normal bowel sounds; Soft, nontender, nondistended, no organomegaly   EXTREMITIES:  2+ Peripheral Pulses, brisk capillary refill. No clubbing, cyanosis, or edema  MSK: No gross deformities noted   Neurological:  A&Ox3, no focal deficits   SKIN: No rashes or lesions  PSYCH: +occasional illogical comments    TELEMETRY:    LABS:                        10.5   5.98  )-----------( 155      ( 16 Oct 2023 04:50 )             32.4     10-15    142  |  102  |  47<H>  ----------------------------<  81  4.6   |  24  |  4.04<H>    Ca    7.6<L>      15 Oct 2023 11:04  Phos  3.9     10-15  Mg     2.00     10-15              Creatinine Trend: 4.04<--, 3.04<--, 4.53<--, 3.72<--, 4.97<--, 3.86<--  I&O's Summary    BNP    RADIOLOGY & ADDITIONAL STUDIES:

## 2023-10-16 NOTE — PROGRESS NOTE ADULT - SUBJECTIVE AND OBJECTIVE BOX
[facility] Division of Kidney Diseases & Hypertension  HEMODIALYSIS NOTE  --------------------------------------------------------------------------------  Chief Complaint: ESRD/Ongoing hemodialysis requirement    24 hour events/subjective: Patient was seen and evaluated during dialysis.  Blood pressure trend and blood flow rate reviewed.  Patient was tolerating procedure well.  Patient not cooperating with ROS.    PAST HISTORY  --------------------------------------------------------------------------------  No significant changes to PMH, PSH, FHx, SHx, unless otherwise noted    ALLERGIES & MEDICATIONS  --------------------------------------------------------------------------------  Allergies    Tylenol (Other)    Intolerances    Standing Inpatient Medications  aspirin enteric coated 81 milliGRAM(s) Oral daily  atorvastatin 40 milliGRAM(s) Oral at bedtime  chlorhexidine 2% Cloths 1 Application(s) Topical daily  dextrose 5%. 1000 milliLiter(s) IV Continuous <Continuous>  dextrose 5%. 1000 milliLiter(s) IV Continuous <Continuous>  dextrose 50% Injectable 25 Gram(s) IV Push once  dextrose 50% Injectable 25 Gram(s) IV Push once  dextrose 50% Injectable 12.5 Gram(s) IV Push once  gabapentin 100 milliGRAM(s) Oral at bedtime  glucagon  Injectable 1 milliGRAM(s) IntraMuscular once  haloperidol     Tablet 1 milliGRAM(s) Oral at bedtime  heparin   Injectable 5000 Unit(s) SubCutaneous every 12 hours  losartan 25 milliGRAM(s) Oral daily  metoprolol succinate ER 25 milliGRAM(s) Oral daily  multivitamin 1 Tablet(s) Oral daily  mupirocin 2% Ointment 1 Application(s) Both Nostrils two times a day  pancrelipase  (CREON  6,000 Lipase Units) 1 Capsule(s) Oral three times a day with meals  polyethylene glycol 3350 17 Gram(s) Oral daily  senna 2 Tablet(s) Oral at bedtime  vitamin E 200 International Unit(s) Oral daily    PRN Inpatient Medications  dextrose Oral Gel 15 Gram(s) Oral once PRN  LORazepam   Injectable 1 milliGRAM(s) IV Push once PRN  LORazepam   Injectable 1 milliGRAM(s) IV Push every 8 hours PRN  oxyCODONE    IR 10 milliGRAM(s) Oral every 4 hours PRN      REVIEW OF SYSTEMS  --------------------------------------------------------------------------------  unable to obtain    VITALS/PHYSICAL EXAM  --------------------------------------------------------------------------------  T(C): 36.4 (10-16-23 @ 06:40), Max: 36.8 (10-15-23 @ 22:00)  HR: 79 (10-16-23 @ 06:40) (79 - 89)  BP: 122/99 (10-16-23 @ 06:40) (122/99 - 151/86)  RR: 18 (10-16-23 @ 06:40) (18 - 19)  SpO2: 97% (10-16-23 @ 05:30) (97% - 100%)  Wt(kg): --        Physical Exam:  	Gen: NAD  	HEENT: anicteric  	Pulm: CTA B/L  	CV: S1S2; no JVD  	Abd: soft abdomen  	: no suprapubic tenderness  	Extremities: dry   	Neuro: awake but not conversant  	Skin: dry   	Vascular access: DARYL WILLIS    LABS/STUDIES  --------------------------------------------------------------------------------              10.5   5.98  >-----------<  155      [10-16-23 @ 04:50]              32.4     139  |  102  |  53  ----------------------------<  72      [10-16-23 @ 04:50]  4.9   |  24  |  4.56        Ca     7.7     [10-16-23 @ 04:50]      Mg     2.00     [10-16-23 @ 04:50]      Phos  4.1     [10-16-23 @ 04:50]    TPro  5.5  /  Alb  2.9  /  TBili  0.3  /  DBili  x   /  AST  42  /  ALT  77  /  AlkPhos  234  [10-16-23 @ 04:50]          Lipid: chol 96, TG 62, HDL 56, LDL --      [10-12-23 @ 05:00]      POCT  Blood Glucose (10-16-23 @ 09:58)  Syphilis Screen: STAT (10-16-23 @ 07:22)  POCT  Blood Glucose (10-16-23 @ 05:12)  POCT  Blood Glucose (10-15-23 @ 21:13)  Chart Check (10-15-23 @ 20:15)  POCT  Blood Glucose (10-15-23 @ 16:57)  Hemodialysis Treatment.:     Schedule: Once, Modality: Hemodialysis, Access: Arteriovenous Fistula    Dialyzer: Revaclear 300, Time: 195 Min    Blood Flow: 400 mL/Min , Dialysate Flow: 600 mL/Min, Dialysate Temp: 36.5, Tubinmm (Adult)    Target Fluid Removal: 2.5 Liters    Dialysate Electrolytes (mEq/L):  Calcium 3, Sodium 138, Bicarbonate 30    Potassium Protocol  -->For serum potassium LESS THAN or EQUAL TO 3.4, notify MD/PA/NP       For serum potassium 3.5 - 4, use 3K dialysate bath       For serum potassium 4.1 - 5.9, use 2K dialysate bath       For serum potassium GREATER THAN or EQUAL TO 6, notify MD/PA/NP    Additional Instructions: keep SBP >90 (10-15-23 @ 12:54)  Provider to RN:       Pls attempt to get morning labs as patient is ESRD and may need electrolyte correction (10-15-23 @ 11:50)  POCT  Blood Glucose (10-15-23 @ 11:20)

## 2023-10-16 NOTE — BH CONSULTATION LIAISON PROGRESS NOTE - NSBHFUPINTERVALHXFT_PSY_A_CORE
Chart reviewed. Vital signs stable. Patient refused atorvastatin, gabapentin, haloperidol, and senna at bedtime on 10/15. Patient has not required lorazepam PRN since 10/10. Patient seen and examined at bedside. Patient reports his mood is "all right." Patient states he has been eating and sleeping "okay." Patient reports that hospital staff have not been serving him his meals as requested. Patient was observed writing down a list of the items he wanted to request for the next meal. Per RN, patient has been hoarding food in the bed and cabinet drawers, which produced a foul odor in the room. Patient's room was thoroughly cleaned while the patient went to dialysis this morning. Patient currently denies any SIIP, HIIP, AVH, or paranoia. Patient reports feeling safe in the hospital.

## 2023-10-16 NOTE — PROGRESS NOTE ADULT - ATTENDING COMMENTS
63M w/ ESRD on HD, chronic systolic HF (EF 20-25%), alcohol use, h/o HCV (most recent VL negative), chronic pancreatitis (taking chronic opioids), COPD, h/o esophageal stricture (stented 5/22), h/o UE DVT (1/2023, no longer on a/c), recent hospitalization for possible opioid overdose requiring intubation p/w acute on chronic abdominal pain in setting of running out of pain medications at home and PCP reluctant to prescribe more given recent possible OD. Found to have new/worsening psychosis – psychiatry consulted and as part of this workup received CTH, which showed L frontal lobe cortical subacute/chronic infarct    - Psychosis - no prior psych diagnosis, but per family has had psychotic symptoms for many years. Appreciate psychiatry involvement; s/p thiamine 500mg IV TID x3 days (10/6-10/9); continue to offer haldol (note patient has been declining); psych helping to determine whether he is stable to discharge. F/up syphilis screen. HIV was negative last year.   - Subacute/chronic L frontal lobe cortical infarct - asymptomatic; continue ASA/statin. Telemetry monitoring per neuro, declining ILR  - Systolic heart failure - TTE w/ EF 20-25% ddx includes ischemic CMP and toxic 2/2 EtOH. Continue metoprolol and start losartan. Reviewed last cardiology note from earlier this year - had [facility] 1/2023 with non-obstructive CAD, cardiologist had wanted to start GDMT but BP was too low at that time - can f/up with outpatient cards after d/c  - Chronic pancreatitis - pain service consulted (now signed off) - pain currently well controlled on current regimen - recommend consideration of slow taper vs consider transition to suboxone as outpatient - will coordinate with outpatient provider prior to d/c- team contacted PCP today to discuss  - ESRD on HD - renal consulted for HD    Discussed with psychiatry team 63M w/ ESRD on HD, chronic systolic HF (EF 20-25%), alcohol use, h/o HCV (most recent VL negative), chronic pancreatitis (taking chronic opioids), COPD, h/o esophageal stricture (stented 5/22), h/o UE DVT (1/2023, no longer on a/c), recent hospitalization for possible opioid overdose requiring intubation p/w acute on chronic abdominal pain in setting of running out of pain medications at home and PCP reluctant to prescribe more given recent possible OD. Found to have new/worsening psychosis – psychiatry consulted and as part of this workup received CTH, which showed L frontal lobe cortical subacute/chronic infarct    - Psychosis - no prior psych diagnosis, but per family has had psychotic symptoms for many years. Appreciate psychiatry involvement; s/p thiamine 500mg IV TID x3 days (10/6-10/9); continue to offer haldol (note patient has been declining); psych helping to determine whether he is stable to discharge. F/up syphilis screen. HIV was negative last year.   - Subacute/chronic L frontal lobe cortical infarct - asymptomatic; continue ASA/statin. Telemetry monitoring per neuro, declining ILR  - Systolic heart failure - TTE w/ EF 20-25% ddx includes ischemic CMP and toxic 2/2 EtOH. Continue metoprolol and start losartan. Reviewed last cardiology note from earlier this year - had [facility] 1/2023 with non-obstructive CAD, cardiologist had wanted to start GDMT but BP was too low at that time - can f/up with outpatient cards after d/c  - Chronic pancreatitis - pain service consulted (now signed off) - pain currently well controlled on current regimen - team contacted PCP today to discuss and PCP recommended discharge on oxycodone 10mg Q4H with plan for slow outpatient taper  - ESRD on HD - renal consulted for HD    Discussed with psychiatry team

## 2023-10-16 NOTE — PROGRESS NOTE ADULT - PROBLEM SELECTOR PLAN 1
Pt. with ESRD on HD three times a week (MWF). Last HD was on 10/13 via [facility]. Pt. clinically stable. Labs meds, vitals, chart, ca/p/k reviewed.  Tolerating dialysis today.  Plan for next HD wednesday.

## 2023-10-16 NOTE — PROGRESS NOTE ADULT - PROBLEM SELECTOR PLAN 1
- Psych c/s appreciated: pt with estella psychosis; cannot leave AMA but no current need for 1:1  - Current Psych recs:  -- standing haloperidol 1mg PO qHS *continue to monitor QTc interval and HOLD for QTc >500 ms*  -- lorazepam 1mg q8h PRN PO/IM/IV for severe agitation per Psych  - Daily ECG for QTc evaluation (QTc on 10/11: 490 ms)  - Not a candidate for Psychiatry admission while on HD  - Need to r/o organic causes for worsening psychosis per Psych - f/u Neuro regarding checking RPR, HIV, malignancy work up, MRI brain  - waiting on psych follow up - Psych c/s appreciated: pt with estella psychosis; cannot leave AMA but no current need for 1:1  - Current Psych recs:  -- standing haloperidol 1mg PO qHS *continue to monitor QTc interval and HOLD for QTc >500 ms*  -- lorazepam 1mg q8h PRN PO/IM/IV for severe agitation per Psych  - Daily EKG for QTc evaluation (QTc on 10/11: 490 ms)  - Not a candidate for Psychiatry admission while on HD  - Need to r/o organic causes for worsening psychosis per Psych - f/u Neuro regarding checking RPR, HIV, malignancy work up, MRI brain  - f/u psych recs

## 2023-10-16 NOTE — PROGRESS NOTE ADULT - PROBLEM SELECTOR PLAN 3
-BNP >20870, troponin 131  -Ddx includes ischemic CM, DCM 2/2 EtOH  -Clinically euvolemic  -monitor I/Os, daily weights  -S/p TTE w/ bubble study (10/11) negative for intracardiac shunt, LVEF 20-25% with moderate AI and moderate MR. No LV or RV enlargement.  -Contact outpatient Cardiologist (Dr. Brian Juárez) for follow-up plans for ischemic w/u and GDMT optimization (and consider inpatient ischemic w/u)  -C/w metop succinate 25 mg QD  -C/w Losartan 25 QD  - consider inpatient cards consult

## 2023-10-16 NOTE — PROGRESS NOTE ADULT - PROBLEM SELECTOR PLAN 2
- CTH with subacute/chronic L frontal lobe cortical infarct (new compared to Aug 2023)  - Patient to remain on tele while admitted per Neuro  - S/p CTA H/N 10/11 with no new progression; subacute-chronic L frontal lobe cortical infarct, no large vessel occlusion or major stenosis.  - TTE w/ bubble study 10/11 negative for intracardiac shunt  - Neurology following -- appreciate recs  - C/w atorvastatin 40 mg   - Start ASA81 on 10/14  - F/u with Neuro regarding workup for a possible organic cause for worsening psychosis  - Pt to follow up with Dr. Saenz after discharge:  [address] [place] [address]. Call [phone]. - CTH with subacute/chronic L frontal lobe cortical infarct (new compared to Aug 2023)  - Patient to remain on tele while admitted per Neuro  - S/p CTA H/N 10/11 with no new progression; subacute-chronic L frontal lobe cortical infarct, no large vessel occlusion or major stenosis.  - TTE w/ bubble study 10/11 negative for intracardiac shunt  - Neurology following -- appreciate recs  - C/w atorvastatin 40 mg   - C/w ASA81 started 10/14  - F/u with Neuro regarding workup for a possible organic cause for worsening psychosis  - Pt to follow up with Dr. Saenz after discharge:  [address] [place] [address]. Call [phone].

## 2023-10-17 NOTE — PROGRESS NOTE ADULT - PROBLEM SELECTOR PLAN 7
-nutrition consult appreciated  -passed bedside swallow test 10/11 AM, resumed regular diet.  -S/p SLP, recs regular solids and thin liquids  -order cine esophagram -CT A/P 10/5: Sequela of chronic pancreatitis with mild common bile duct dilatation unchanged. The prior peripancreatic foci of air have resolved.  -pain management c/s appreciated: gabapentin 100 bedtime; oxy 10 mg PO q4 prn; lidocaine patch x5d; outpt f/u  -Chronic pain service consulted (now signed off) - pain currently well controlled on current regimen - previous team contacted pt's PCP Dr. Hector Odom, who recommended discharge on oxycodone 10mg Q4H with plan for slow outpatient taper  -c/w pancrelipase

## 2023-10-17 NOTE — PROGRESS NOTE ADULT - PROBLEM SELECTOR PLAN 2
- CTH with subacute/chronic L frontal lobe cortical infarct (new compared to Aug 2023)  - Patient to remain on tele while admitted per Neuro  - S/p CTA H/N 10/11 with no new progression; subacute-chronic L frontal lobe cortical infarct, no large vessel occlusion or major stenosis.  - TTE w/ bubble study 10/11 negative for intracardiac shunt  - Neurology following -- appreciate recs  - C/w atorvastatin 40 mg   - C/w ASA81 started 10/14  - F/u with Neuro regarding workup for a possible organic cause for worsening psychosis  - Pt to follow up with Dr. Saenz after discharge:  [address] [place] [address]. Call [phone]. Pt with reduced po intake with episodes of hypoglycemia. Pt found to be hoarding food in room, reportedly has similar behaviour at home  -Nutrition consulted, recs appreciated  -Monitor strict calorie count

## 2023-10-17 NOTE — PROVIDER CONTACT NOTE (OTHER) - REASON
patient refused nighttime medications despite education
pt had episode of tachycardia up to 132 bpm on tele
Pt refusing medications
3 beats vtach on tele
patient refused morning medications except heparin and oxy
patient refused nighttime medications despite education
pt refusing to go to CT scan, pt complains of 10/10 abdominal pain
Patient AXO4 and refused to take all bedtime meds - Gabapentin, Haldol, and Senna.

## 2023-10-17 NOTE — PROVIDER CONTACT NOTE (OTHER) - ASSESSMENT
Pt is stable
Patient AXO4. No s/s of distress noticed.
Patient AXO4. No s/s of distress noticed.
Pt VSS, pt unwilling to participate in any testing, EKG, or other medication administration at this time. Pt strict NPO.
Patient AXO4. No s/s of distress noticed.
Patient AXO4. No s/s of distress noticed.
Pt had episode of painful hiccups at this time, VSS /87 HR 98bpm, SpO2 98%
Patient AXO4. No s/s of distress noticed.

## 2023-10-17 NOTE — BH CONSULTATION LIAISON PROGRESS NOTE - NSBHFUPINTERVALHXFT_PSY_A_CORE
Chart reviewed. Vital signs stable. Patient refused haloperidol at bedtime on 10/16. Patient refused all other medications besides oxycodone and heparin on the morning of 10/17. Patient has not required lorazepam PRN since 10/10. Patient seen and examined at bedside. Patient was eating breakfast and portioning food into separate containers/cups. Patient states he received food from a person outside of the hospital, but does not know who sent it. Patient reports his mood is "okay." Patient states he has been eating and sleep "okay." Patient endorses frustration that the staff "threw out my stuff" in reference to his stashed food. Patient denies any issues with eating or swallowing. Patient does not actively disclose delusional thoughts during the interview, in contrast to earlier encounters. Patient currently denies any SIIP, HIIP, AVH, or paranoia. Patient endorses feeling safe in the hospital.

## 2023-10-17 NOTE — PROGRESS NOTE ADULT - PROBLEM SELECTOR PLAN 1
- Psych c/s appreciated: pt with estella psychosis; cannot leave AMA but no current need for 1:1  - Current Psych recs:  -- standing haloperidol 1mg PO qHS *continue to monitor QTc interval and HOLD for QTc >500 ms*  -- lorazepam 1mg q8h PRN PO/IM/IV for severe agitation per Psych  - Daily EKG for QTc evaluation (QTc on 10/11: 490 ms)  - Not a candidate for Psychiatry admission while on HD  - Need to r/o organic causes for worsening psychosis per Psych - f/u Neuro regarding checking RPR, HIV, malignancy work up, MRI brain  - f/u psych recs No prior psych diagnosis, but per family has had psychotic symptoms for many years.   -s/p thiamine 500mg IV TID x3 days (10/6-10/9)  -Psych c/s appreciated: pt with estella psychosis; cannot leave AMA but no current need for 1:1  -continue to offer standing haloperidol 1mg PO qHS, however pt refusing   -lorazepam 1mg q8h PRN PO/IM/IV for severe agitation per Psych  -Monitor ECG for QTc evaluation (QTc on 10/11: 490 ms)  -Psych helping to determine whether he is stable for discharge, inpt psych options limited by need for HD

## 2023-10-17 NOTE — BH CONSULTATION LIAISON PROGRESS NOTE - NSBHASSESSMENTFT_PSY_ALL_CORE
63-yo AAM, single, reportedly domiciled with his mother, patient denying any prior psychiatric history (was treated in care home 25 years ago with haldol for unknown symptoms/diagnosis and never followed up after release from care home) and history of alcohol abuse (reports quitting "long time ago"), PMH of HCV, Hx of esophageal stricture (S/p stenting in 5/22), Emphysema, chronic pancreatitis, multiple prior medical hospitalizations for confusion, pancreatitis, etc., psych c/s for psychosis.    Patient exhibiting estella psychosis - has delusions, paranoia, referential thoughts, thought insertion, grandiose/hyperreligiousness. Given CKD and HD, concern for lewy body dementia and avoidance of EPS, would begin Olanzapine though must monitor QTc and LFTs which are already somewhat elevated. Needs further psych assessment. Cognitively intact despite worse psychosis than prior hospitalizations. No current signs of etoh withdrawal despite 0 etoh, no current role for CIWA unless such features abruptly begin.     10/9: Patient was calm but continues to exhibit estella psychosis. These features were similar in content to the examination on 10/6, particularly in regard to delusions, referential thoughts, thought insertion, and grandiose ideology. Patient found to have subacute/chronic L frontal lobe cortical infarct on CT head that was new compared to scan in August of 2023. QTc prolonged.    10/10: Patient was calm with no significant alterations in his state of psychosis. These features reflected those observed on previous examinations, with delusions, referential thoughts, though insertion, and grandiose ideology. Repeat EKG on 10/9 demonstrated a QTc of 462 ms. Patient can be started on standing haloperidol 1mg PO qHS at night for psychosis due to minimal interference with platelet function in the setting of consistent thrombocytopenia.     10/12: Patient denied all bedtime medications on 10/11, including haloperidol. Patient was initially calm but became upset during a discussion regarding the use of haloperidol. Repeat EKG on 10/11 demonstrated a QTc of 490 ms. Patient should be offered haloperidol 1mg PO qHS at night for continued psychosis.    10/13: Patient did not take haloperidol on 10/12. Patient was initially calm but became agitated when he suspected the providers did not believe that his delusions were based in reality. Patient should be offered haloperidol 1mg PO qHS at night for continued psychosis. Since it is unclear at times if patient is eating or spitting out food, recommend strict calorie counting to determine if patient is meeting nutritional goals, as patient's appetite and eating habits do not align with body habitus.    10/16: Patient continues to refuse haloperidol and other medications. Patient continues to nidia food in bed and cabinets. Per chart, this behavior has been associated with food insecurity at home, but other team members report that this behavior has occurred at home as well. Recommend limiting patient's meals to normal feeding times if this does not cause the patient to become agitated.     10/17: Patient continues to refuse haloperidol and some other medications. Patient actively portioning food into containers/cups during the interview. Patient states that the staff "threw out my stuff" in reference to his stashed food.     Recs:  - no current need for 1:1  - cannot leave AMA needs further psych eval, patient psychotic  - Repeat EKG on 10/9 demonstrated QTc of 462 ms; repeat EKG on 10/11 demonstrated QTc of 490 ms  - Recommend lorazepam 1mg q8h PRN PO/IM/IV for severe agitation - Will not prolong QTc interval  - Discontinue PRN severe agitation Olanzapine 2.5mg q6h PRN PO/IM  - Continue to offer standing haloperidol 1mg PO qHS at night *Please continue to monitor QTc interval and HOLD for QTc >500 ms*  - Recommend strict calorie counting to determine if patient is meeting nutritional goals - Patient's appetite and eating habits do not align with body habitus  - Recommend limiting patient's meals to normal feeding times if patient does not become excessively agitated   - s/p empiric thiamine repletion 500mg IV TID for 3d (completed 10/9)  - CT head with subacute/chronic L frontal lobe cortical infarct, CTA head/neck with no large vessel occlusion or stenosis - patient's mental status much worse than prior hospitalizations  - F/u HIV, Treponema pallidum ab (negative)  - Psych will follow    Dispo: Pending additional evaluation of patient's ability to care for basic needs outside of the hospital setting 63-yo AAM, single, reportedly domiciled with his mother, patient denying any prior psychiatric history (was treated in assisted 25 years ago with haldol for unknown symptoms/diagnosis and never followed up after release from assisted) and history of alcohol abuse (reports quitting "long time ago"), PMH of HCV, Hx of esophageal stricture (S/p stenting in 5/22), Emphysema, chronic pancreatitis, multiple prior medical hospitalizations for confusion, pancreatitis, etc., psych c/s for psychosis.    Patient exhibiting estella psychosis - has delusions, paranoia, referential thoughts, thought insertion, grandiose/hyperreligiousness. Given CKD and HD, concern for lewy body dementia and avoidance of EPS, would begin Olanzapine though must monitor QTc and LFTs which are already somewhat elevated. Needs further psych assessment. Cognitively intact despite worse psychosis than prior hospitalizations. No current signs of etoh withdrawal despite 0 etoh, no current role for CIWA unless such features abruptly begin.     10/9: Patient was calm but continues to exhibit estella psychosis. These features were similar in content to the examination on 10/6, particularly in regard to delusions, referential thoughts, thought insertion, and grandiose ideology. Patient found to have subacute/chronic L frontal lobe cortical infarct on CT head that was new compared to scan in August of 2023. QTc prolonged.    10/10: Patient was calm with no significant alterations in his state of psychosis. These features reflected those observed on previous examinations, with delusions, referential thoughts, though insertion, and grandiose ideology. Repeat EKG on 10/9 demonstrated a QTc of 462 ms. Patient can be started on standing haloperidol 1mg PO qHS at night for psychosis due to minimal interference with platelet function in the setting of consistent thrombocytopenia.     10/12: Patient denied all bedtime medications on 10/11, including haloperidol. Patient was initially calm but became upset during a discussion regarding the use of haloperidol. Repeat EKG on 10/11 demonstrated a QTc of 490 ms. Patient should be offered haloperidol 1mg PO qHS at night for continued psychosis.    10/13: Patient did not take haloperidol on 10/12. Patient was initially calm but became agitated when he suspected the providers did not believe that his delusions were based in reality. Patient should be offered haloperidol 1mg PO qHS at night for continued psychosis. Since it is unclear at times if patient is eating or spitting out food, recommend strict calorie counting to determine if patient is meeting nutritional goals, as patient's appetite and eating habits do not align with body habitus.    10/16: Patient continues to refuse haloperidol and other medications. Patient continues to nidia food in bed and cabinets. Per chart, this behavior has been associated with food insecurity at home, but other team members report that this behavior has occurred at home as well. Recommend limiting patient's meals to normal feeding times if this does not cause the patient to become agitated.     10/17: Patient continues to refuse haloperidol and some other medications. Patient actively portioning food into containers/cups during the interview. Patient states that the staff "threw out my stuff" in reference to his stashed food.     Recs:  - no current need for 1:1, would recommend transfer to [address] [place] [address]   - Begin Abilify 2mg qAM   - Repeat EKG on 10/9 demonstrated QTc of 462 ms; repeat EKG on 10/11 demonstrated QTc of 490 ms  - Recommend lorazepam 1mg q8h PRN PO/IM/IV for severe agitation - Will not prolong QTc interval  - Discontinue PRN severe agitation Olanzapine 2.5mg q6h PRN PO/IM  - Continue to offer standing haloperidol 1mg PO qHS at night *Please continue to monitor QTc interval and HOLD for QTc >500 ms*  - Recommend strict calorie counting to determine if patient is meeting nutritional goals - Patient's appetite and eating habits do not align with body habitus  - Recommend limiting patient's meals to normal feeding times if patient does not become excessively agitated   - s/p empiric thiamine repletion 500mg IV TID for 3d (completed 10/9)  - CT head with subacute/chronic L frontal lobe cortical infarct, CTA head/neck with no large vessel occlusion or stenosis - patient's mental status much worse than prior hospitalizations  - F/u HIV, Treponema pallidum ab (negative)  - Psych will follow    Dispo: Pending additional evaluation of patient's ability to care for basic needs outside of the hospital setting

## 2023-10-17 NOTE — PROVIDER CONTACT NOTE (OTHER) - ACTION/TREATMENT ORDERED:
provider notified.  Will continue to reeducate the patient regarding the importance of following medication regimen.
provider notified.  Will continue to reeducate the patient regarding the importance of following medication regimen.
ACP notified and aware, Provider will come assess pt at bedside.
provider notified.
States she is aware that pt has been refusing medications
Provider Fallon Hawley ([facility]) notified. No new orders/recommendations made at this time. Will continue to reeducate the patient regarding the importance of following medication regimen.
provider notified.  Will continue to reeducate the patient regarding the importance of following medication regimen.
ACP notified and aware, IV Dilaudid ordered and administered.

## 2023-10-17 NOTE — PROVIDER CONTACT NOTE (OTHER) - DATE AND TIME:
11-Oct-2023 22:05
10-Oct-2023 21:22
16-Oct-2023 00:06
17-Oct-2023 07:33
16-Oct-2023 14:20
17-Oct-2023 01:31
10-Oct-2023 08:04
16-Oct-2023 23:44

## 2023-10-17 NOTE — PROGRESS NOTE ADULT - PROBLEM SELECTOR PLAN 1
Pt. with ESRD on HD three times a week (MWF). Last HD was on 10/16 via AVF tolerated about 2 kg UF. Pt. clinically stable. Labs meds, vitals, chart, ca/p/k reviewed.  Plan for next HD wednesday.  Medication dosing reviewed.

## 2023-10-17 NOTE — PROGRESS NOTE ADULT - PROBLEM SELECTOR PLAN 6
-CT A/P 10/5: Sequela of chronic pancreatitis with mild common bile duct dilatation unchanged. The prior peripancreatic foci of air have resolved.  -pain management c/s appreciated: gabapentin 100 bedtime; oxy 10 mg PO q4 prn; lidocaine patch x5d; outpt f/u  -Chronic pain recommends considering gradually weaning off opioids as tolerated (oxy dose reduction by 10% weekly, and continue f/u with outpatient Pain Medicine) and maximizing non-opioid analgesia as appropriate, as pain stable and patient does not have definite organic cause that opioids are actively treating -- appreciate recs  -Could consider Suboxone, patient previously declined  -c/w pancrelipase  -recent episodes of hypoglycemia may be attributed to his chronic pancreatitis endocrine dysfunction  -F/u with outpatient PCP (Dr. Hector Kelly) regarding pain regimen for discharge -C/w DAGMAR MWF  -appreciate Nephro recs

## 2023-10-17 NOTE — PROGRESS NOTE ADULT - SUBJECTIVE AND OBJECTIVE BOX
**********************************************  [facility] Division of Hospital Medicine  Roslyn Bernard MD  Available via MS Teams  Pager: [phone]  **********************************************     Patient is a 63y old  Male who presents with a chief complaint of chronic pancreatitis pain (17 Oct 2023 07:18)    SUBJECTIVE / OVERNIGHT EVENTS: No acute events overnight. Patient examined at bedside this AM, with no subjective complaints. Denies CP, palpitations, SOB, n/v/d, abdominal pain.     OBJECTIVE:  Vital Signs Last 24 Hrs  T(C): 36.3 (17 Oct 2023 09:43), Max: 37.1 (16 Oct 2023 16:43)  T(F): 97.4 (17 Oct 2023 09:43), Max: 98.7 (16 Oct 2023 16:43)  HR: 69 (17 Oct 2023 09:43) (69 - 91)  BP: 135/75 (17 Oct 2023 09:43) (91/55 - 135/75)  BP(mean): --  RR: 18 (17 Oct 2023 09:43) (18 - 18)  SpO2: 100% (17 Oct 2023 09:43) (100% - 100%)    Parameters below as of 17 Oct 2023 09:43  Patient On (Oxygen Delivery Method): room air        I&O's Summary    16 Oct 2023 07:01  -  17 Oct 2023 07:00  --------------------------------------------------------  IN: 1000 mL / OUT: 2700 mL / NET: -1700 mL      Physical Exam:     General: No acute distress, cachectic male     Eyes: PERRL, EOMI     Pulm: No increased WOB. CTAB. No wheezing.     CV: RRR. S1&S2+. No M/R/G appreciated.     Abdomen: +BS. Soft, NTND. No organomegaly.     MSK: Nml ROM    Extremities: No peripheral edema or cyanosis.     Neuro: A&Ox2, no focal deficits     Skin: Warm and dry. No visible rash.     Labs:  CAPILLARY BLOOD GLUCOSE      POCT Blood Glucose.: 72 mg/dL (17 Oct 2023 11:27)  POCT Blood Glucose.: 87 mg/dL (17 Oct 2023 07:13)  POCT Blood Glucose.: 227 mg/dL (16 Oct 2023 22:03)  POCT Blood Glucose.: 245 mg/dL (16 Oct 2023 16:24)                          10.0   5.92  )-----------( 158      ( 17 Oct 2023 05:10 )             30.8     10-17    143  |  105  |  42<H>  ----------------------------<  78  4.2   |  24  |  3.73<H>    Ca    8.0<L>      17 Oct 2023 05:10  Phos  3.9     10-17  Mg     1.90     10-17    TPro  5.4<L>  /  Alb  2.9<L>  /  TBili  0.3  /  DBili  x   /  AST  45<H>  /  ALT  75<H>  /  AlkPhos  229<H>  10-17            Urinalysis Basic - ( 17 Oct 2023 05:10 )    Color: x / Appearance: x / SG: x / pH: x  Gluc: 78 mg/dL / Ketone: x  / Bili: x / Urobili: x   Blood: x / Protein: x / Nitrite: x   Leuk Esterase: x / RBC: x / WBC x   Sq Epi: x / Non Sq Epi: x / Bacteria: x      Imaging Personally Reviewed:      MEDICATIONS  (STANDING):  aspirin enteric coated 81 milliGRAM(s) Oral daily  atorvastatin 40 milliGRAM(s) Oral at bedtime  chlorhexidine 2% Cloths 1 Application(s) Topical daily  dextrose 5%. 1000 milliLiter(s) (50 mL/Hr) IV Continuous <Continuous>  dextrose 5%. 1000 milliLiter(s) (100 mL/Hr) IV Continuous <Continuous>  dextrose 50% Injectable 25 Gram(s) IV Push once  dextrose 50% Injectable 12.5 Gram(s) IV Push once  dextrose 50% Injectable 25 Gram(s) IV Push once  gabapentin 100 milliGRAM(s) Oral at bedtime  glucagon  Injectable 1 milliGRAM(s) IntraMuscular once  haloperidol     Tablet 1 milliGRAM(s) Oral at bedtime  heparin   Injectable 5000 Unit(s) SubCutaneous every 12 hours  losartan 25 milliGRAM(s) Oral daily  metoprolol succinate ER 25 milliGRAM(s) Oral daily  multivitamin 1 Tablet(s) Oral daily  pancrelipase  (CREON  6,000 Lipase Units) 1 Capsule(s) Oral three times a day with meals  polyethylene glycol 3350 17 Gram(s) Oral daily  senna 2 Tablet(s) Oral at bedtime  vitamin E 200 International Unit(s) Oral daily    MEDICATIONS  (PRN):  dextrose Oral Gel 15 Gram(s) Oral once PRN Blood Glucose LESS THAN 70 milliGRAM(s)/deciliter  LORazepam   Injectable 1 milliGRAM(s) IV Push once PRN Anxiety  LORazepam   Injectable 1 milliGRAM(s) IV Push every 8 hours PRN Agitation  oxyCODONE    IR 10 milliGRAM(s) Oral every 4 hours PRN Mod-severe pain

## 2023-10-17 NOTE — PROGRESS NOTE ADULT - SUBJECTIVE AND OBJECTIVE BOX
[facility] Division of Kidney Diseases & Hypertension  FOLLOW UP NOTE  --------------------------------------------------------------------------------  Chief Complaint: Pain    24 hour events/subjective: Patient seen and evaluated at bedside this morning.  No chest pain.  Chronic pancreatitis pain is improved.  Reports continued hiccups.  Tolerated HD well yesterday.        PAST HISTORY  --------------------------------------------------------------------------------  No significant changes to PMH, PSH, FHx, SHx, unless otherwise noted    ALLERGIES & MEDICATIONS  --------------------------------------------------------------------------------  Allergies    Tylenol (Other)    Intolerances      Standing Inpatient Medications  aspirin enteric coated 81 milliGRAM(s) Oral daily  atorvastatin 40 milliGRAM(s) Oral at bedtime  chlorhexidine 2% Cloths 1 Application(s) Topical daily  dextrose 5%. 1000 milliLiter(s) IV Continuous <Continuous>  dextrose 5%. 1000 milliLiter(s) IV Continuous <Continuous>  dextrose 50% Injectable 25 Gram(s) IV Push once  dextrose 50% Injectable 12.5 Gram(s) IV Push once  dextrose 50% Injectable 25 Gram(s) IV Push once  gabapentin 100 milliGRAM(s) Oral at bedtime  glucagon  Injectable 1 milliGRAM(s) IntraMuscular once  haloperidol     Tablet 1 milliGRAM(s) Oral at bedtime  heparin   Injectable 5000 Unit(s) SubCutaneous every 12 hours  losartan 25 milliGRAM(s) Oral daily  metoprolol succinate ER 25 milliGRAM(s) Oral daily  multivitamin 1 Tablet(s) Oral daily  pancrelipase  (CREON  6,000 Lipase Units) 1 Capsule(s) Oral three times a day with meals  polyethylene glycol 3350 17 Gram(s) Oral daily  senna 2 Tablet(s) Oral at bedtime  vitamin E 200 International Unit(s) Oral daily    PRN Inpatient Medications  dextrose Oral Gel 15 Gram(s) Oral once PRN  LORazepam   Injectable 1 milliGRAM(s) IV Push every 8 hours PRN  LORazepam   Injectable 1 milliGRAM(s) IV Push once PRN  oxyCODONE    IR 10 milliGRAM(s) Oral every 4 hours PRN      REVIEW OF SYSTEMS  --------------------------------------------------------------------------------  Gen: no fever  Respiratory: no sob  CV: no cp  GI: as per hpi  : no pain  MSK: no complaints    VITALS/PHYSICAL EXAM  --------------------------------------------------------------------------------  T(C): 36.4 (10-17-23 @ 01:13), Max: 37.1 (10-16-23 @ 16:43)  HR: 83 (10-17-23 @ 01:13) (77 - 91)  BP: 113/72 (10-17-23 @ 01:13) (91/55 - 133/75)  ABP: --  ABP(mean): --  RR: 18 (10-17-23 @ 01:13) (18 - 18)  SpO2: 100% (10-17-23 @ 01:13) (100% - 100%)  CVP(mm Hg): --        10-16-23 @ 07:01  -  10-17-23 @ 07:00  --------------------------------------------------------  IN: 500 mL / OUT: 2700 mL / NET: -2200 mL      Physical Exam:  	Gen: NAD  	HEENT: anicteric  	Pulm: CTA B/L  	CV: S1S2; no JVD  	Abd: soft abdomen  	: no suprapubic tenderness  	Extremities: dry   	Neuro: awake but not conversant  	Skin: dry   	Vascular access: DARYL SCHULTZ    LABS/STUDIES  --------------------------------------------------------------------------------              10.0   5.92  >-----------<  158      [10-17-23 @ 05:10]              30.8     143  |  105  |  42  ----------------------------<  78      [10-17-23 @ 05:10]  4.2   |  24  |  3.73        Ca     8.0     [10-17-23 @ 05:10]      Mg     1.90     [10-17-23 @ 05:10]      Phos  3.9     [10-17-23 @ 05:10]    TPro  5.4  /  Alb  2.9  /  TBili  0.3  /  DBili  x   /  AST  45  /  ALT  75  /  AlkPhos  229  [10-17-23 @ 05:10]          Creatinine Trend:  SCr 3.73 [10-17 @ 05:10]  SCr 4.56 [10-16 @ 04:50]  SCr 4.04 [10-15 @ 11:04]  SCr 3.04 [10-14 @ 05:59]  SCr 4.53 [10-13 @ 05:03]    Urinalysis - [10-17-23 @ 05:10]      Color  / Appearance  / SG  / pH       Gluc 78 / Ketone   / Bili  / Urobili        Blood  / Protein  / Leuk Est  / Nitrite       RBC  / WBC  / Hyaline  / Gran  / Sq Epi  / Non Sq Epi  / Bacteria       Lipid: chol 96, TG 62, HDL 56, LDL --      [10-12-23 @ 05:00]      Syphilis Screen (Treponema Pallidum Ab) Negative      [10-16-23 @ 10:10]

## 2023-10-17 NOTE — BH CONSULTATION LIAISON PROGRESS NOTE - OTHER
Patient denies auditory or visual hallucinations during the interview Patient reports his mood is "okay" Appears older than actual age Patient did not disclose delusions during the interview

## 2023-10-17 NOTE — PROVIDER CONTACT NOTE (OTHER) - BACKGROUND
Admitted for abdominal pain.
pt admitted for abdominal pain
Admitted for abdominal pain.
Admitted for abdominal pain.
Pt has been refusing meds
Admitted for abdominal pain.
Admitted for abdominal pain.
pt admitted for abdominal pain

## 2023-10-17 NOTE — PROGRESS NOTE ADULT - PROBLEM SELECTOR PLAN 3
-BNP >41940, troponin 131  -Ddx includes ischemic CM, DCM 2/2 EtOH  -Clinically euvolemic  -monitor I/Os, daily weights  -S/p TTE w/ bubble study (10/11) negative for intracardiac shunt, LVEF 20-25% with moderate AI and moderate MR. No LV or RV enlargement.  -Contact outpatient Cardiologist (Dr. Brian Juárez) for follow-up plans for ischemic w/u and GDMT optimization (and consider inpatient ischemic w/u)  -C/w metop succinate 25 mg QD  -C/w Losartan 25 QD  - consider inpatient cards consult Asymptomatic  - CTH with subacute/chronic L frontal lobe cortical infarct (new compared to Aug 2023)  - Neurology following -- appreciate recs  - S/p CTA H/N 10/11 with no new progression; subacute-chronic L frontal lobe cortical infarct, no large vessel occlusion or major stenosis.  - TTE w/ bubble study 10/11 negative for intracardiac shunt  - Telemetry monitoring per neuro, declining ILR  - C/w atorvastatin 40 mg and ASA 81  - Pt to follow up with Dr. Saenz after discharge: [address] [place] [address]. Call [phone].

## 2023-10-17 NOTE — PROGRESS NOTE ADULT - PROBLEM SELECTOR PLAN 4
-C/w HD MWF  -appreciate Nephro recs  -states he produces minimal urine 3 #macrocytic anemia, likely i/s/o chronic illness/nutritional deficiency  trend H/H -- stable    #thrombocytopenia   evaluated by heme on prior admission, thought to be multifactorial   chronic/stable  monitor/trend

## 2023-10-17 NOTE — PROGRESS NOTE ADULT - ASSESSMENT
63M with PMH of HTN, ESRD on HD, alcohol use disorder, chronic pancreatitis, HCV (s/p tx, most recent VL negative), COPD not on home O2, h/o lung abscess (Pseudomonas/Morgenella), esophageal stricture (s/p stenting 5/2022), UE DVT (1/2022, no longer on AC), recent hospitalization for possible opioid overdose requiring intubation p/w acute on chronic abdominal pain in setting of running out of pain medications at home and PCP reluctant to prescribe more given recent possible OD. Found to have new/worsening psychosis – psychiatry consulted and as part of this workup received CTH, which showed L frontal lobe cortical subacute/chronic infarct. C/c/b hypoglycemia.  63M with PMH of HTN, ESRD on HD, alcohol use disorder, chronic pancreatitis, HCV (s/p tx, most recent VL negative), COPD not on home O2, h/o lung abscess (Pseudomonas/Morgenella), esophageal stricture (s/p stenting 5/2022), RUE DVT (1/2022, no longer on AC), recent hospitalization for possible opioid overdose requiring intubation p/w acute on chronic abdominal pain in setting of running out of pain medications at home and PCP reluctant to prescribe more given recent possible OD. Found to have new/worsening psychosis – psychiatry consulted and as part of this workup received CTH, which showed L frontal lobe cortical subacute/chronic infarct. C/c/b hypoglycemia and dec po intake.

## 2023-10-17 NOTE — PROVIDER CONTACT NOTE (OTHER) - SITUATION
Patient AXO4 and refused to take all bedtime meds - Gabapentin, Haldol, and Senna. Patient educated on risk/benefits of not following the medication regimen, but pt still refusing to take his meds.
Patient AXO4 and refused to take all bedtime meds - atorvastatin Gabapentin, Haldol, and Senna.
pt refusing to go to CT scan, pt complains of 10/10 abdominal pain
3 beats vtach on tele. denies chest pain. VSS
pt had episode of tachycardia up to 132 bpm on tele
Patient AXO4 and refused to take all bedtime meds - atorvastatin Gabapentin, Haldol, and Senna.
Pt refusing all his medications accept for pain meds
patient refused morning medications except heparin and oxy

## 2023-10-17 NOTE — PROGRESS NOTE ADULT - PROBLEM SELECTOR PLAN 8
-previously on apixaban, reportedly for chronic R brachial DVT per [employer] review (doppler from 1/26/23 noted acute DVT at that time, cannot find repeat doppler confirming chronicity in EMR)  -Doppler August 2023 with no DVT in RUE  -currently on SQ heparin DVT ppx: SQH  Diet: Regular   Dispo: Pending clinical improvement   **Cannot AMA**

## 2023-10-17 NOTE — PROGRESS NOTE ADULT - PROBLEM SELECTOR PLAN 5
#macrocytic anemia, likely i/s/o chronic illness/nutritional deficiency  trend H/H -- stable    #thrombocytopenia   evaluated by heme on prior admission, thought to be multifactorial   chronic/stable  monitor/trend -BNP >32481, troponin 131  -Ddx includes ischemic CM, DCM 2/2 EtOH  -Clinically euvolemic  -monitor I/Os, daily weights  -S/p TTE w/ bubble study (10/11) negative for intracardiac shunt, LVEF 20-25% with moderate AI and moderate MR. No LV or RV enlargement.  -Contact outpatient Cardiologist (Dr. Brian Juárez) for follow-up plans for ischemic w/u and GDMT optimization (and consider inpatient ischemic w/u)  -C/w metop succinate 25 mg QD  -C/w Losartan 25 QD

## 2023-10-17 NOTE — PROVIDER CONTACT NOTE (OTHER) - NAME OF MD/NP/PA/DO NOTIFIED:
Fallon Hawley
ananda mejias
Bren Beatty
Nick Cloud
Fallon Hawley
ananda mejias
Padma Zarate
Tricia Bee MD

## 2023-10-17 NOTE — PROVIDER CONTACT NOTE (OTHER) - RECOMMENDATIONS
ACP notified and aware
ACP notified and aware
notify provider.
Attending notfied
Provider Fallon Hawley (TEAMS) notified. No new orders/recommendations made at this time.
notify provider.

## 2023-10-18 NOTE — PROGRESS NOTE ADULT - PROBLEM SELECTOR PLAN 5
-BNP >18097, troponin 131  -Ddx includes ischemic CM, DCM 2/2 EtOH  -Clinically euvolemic  -monitor I/Os, daily weights  -S/p TTE w/ bubble study (10/11) negative for intracardiac shunt, LVEF 20-25% with moderate AI and moderate MR. No LV or RV enlargement.  -Contact outpatient Cardiologist (Dr. Brian Juárez) for follow-up plans for ischemic w/u and GDMT optimization (and consider inpatient ischemic w/u)  -C/w metop succinate 25 mg QD  -Decrease losartan to 12.5mg QD   -Pt noted to have low BP's during HD, will hold Toprol and losartan until after HD on dialysis days.

## 2023-10-18 NOTE — BH CONSULTATION LIAISON PROGRESS NOTE - NSBHPSYCHOLCOGORIENT_PSY_A_CORE
Oriented to time, place, person, situation
Unable to assess
Oriented to time, place, person, situation

## 2023-10-18 NOTE — PROGRESS NOTE ADULT - NUTRITIONAL ASSESSMENT
This patient has been assessed with a concern for Malnutrition and has been determined to have a diagnosis/diagnoses of Severe protein-calorie malnutrition and Underweight (BMI < 19).    This patient is being managed with:   Diet Regular-  Low Sodium  Supplement Feeding Modality:  Oral  Nepro Cans or Servings Per Day:  1       Frequency:  Two Times a day  Ensure Plus High Protein Cans or Servings Per Day:  1       Frequency:  Three Times a day  Entered: Oct  6 2023  2:22PM  
This patient has been assessed with a concern for Malnutrition and has been determined to have a diagnosis/diagnoses of Severe protein-calorie malnutrition and Underweight (BMI < 19).    This patient is being managed with:   Diet Regular-  Supplement Feeding Modality:  Oral  Glucerna Shake Cans or Servings Per Day:  1       Frequency:  Three Times a day  Entered: Oct 12 2023  3:05PM  
This patient has been assessed with a concern for Malnutrition and has been determined to have a diagnosis/diagnoses of Severe protein-calorie malnutrition and Underweight (BMI < 19).    This patient is being managed with:   Diet Regular-  For patients receiving Renal Replacement - No Protein Restr No Conc K No Conc Phos Low Sodium (RENAL)  Supplement Feeding Modality:  Oral  Glucerna Shake Cans or Servings Per Day:  1       Frequency:  Three Times a day  Entered: Oct 18 2023 12:13PM  
This patient has been assessed with a concern for Malnutrition and has been determined to have a diagnosis/diagnoses of Severe protein-calorie malnutrition and Underweight (BMI < 19).    This patient is being managed with:   Diet Regular-  Supplement Feeding Modality:  Oral  Ensure Plus High Protein Cans or Servings Per Day:  1       Frequency:  Three Times a day  Entered: Oct  9 2023  2:11PM  
This patient has been assessed with a concern for Malnutrition and has been determined to have a diagnosis/diagnoses of Severe protein-calorie malnutrition and Underweight (BMI < 19).    This patient is being managed with:   Diet Regular-  Supplement Feeding Modality:  Oral  Ensure Plus High Protein Cans or Servings Per Day:  1       Frequency:  Three Times a day  Entered: Oct 11 2023  1:56PM  
This patient has been assessed with a concern for Malnutrition and has been determined to have a diagnosis/diagnoses of Severe protein-calorie malnutrition and Underweight (BMI < 19).    This patient is being managed with:   Diet Regular-  Low Sodium  Supplement Feeding Modality:  Oral  Ensure Plus High Protein Cans or Servings Per Day:  1       Frequency:  Three Times a day  Entered: Oct  7 2023  9:08AM  
This patient has been assessed with a concern for Malnutrition and has been determined to have a diagnosis/diagnoses of Severe protein-calorie malnutrition and Underweight (BMI < 19).    This patient is being managed with:   Diet Regular-  Supplement Feeding Modality:  Oral  Glucerna Shake Cans or Servings Per Day:  1       Frequency:  Three Times a day  Entered: Oct 12 2023  3:05PM  
This patient has been assessed with a concern for Malnutrition and has been determined to have a diagnosis/diagnoses of Severe protein-calorie malnutrition and Underweight (BMI < 19).    This patient is being managed with:   Diet NPO-  Entered: Oct 10 2023  6:29PM  
This patient has been assessed with a concern for Malnutrition and has been determined to have a diagnosis/diagnoses of Severe protein-calorie malnutrition and Underweight (BMI < 19).    This patient is being managed with:   Diet Regular-  Low Sodium  Supplement Feeding Modality:  Oral  Ensure Plus High Protein Cans or Servings Per Day:  1       Frequency:  Three Times a day  Entered: Oct  7 2023  9:08AM  
This patient has been assessed with a concern for Malnutrition and has been determined to have a diagnosis/diagnoses of Severe protein-calorie malnutrition and Underweight (BMI < 19).    This patient is being managed with:   Diet Regular-  Supplement Feeding Modality:  Oral  Glucerna Shake Cans or Servings Per Day:  1       Frequency:  Three Times a day  Entered: Oct 12 2023  3:05PM

## 2023-10-18 NOTE — PROGRESS NOTE ADULT - PROBLEM SELECTOR PLAN 1
No prior psych diagnosis, but per family has had psychotic symptoms for many years.   -s/p thiamine 500mg IV TID x3 days (10/6-10/9)  -Psych c/s appreciated: pt with estella psychosis; cannot leave AMA but no current need for 1:1. Attempting to move to 7S per Psych's request, no bed today.   -continue to offer standing haloperidol 1mg PO qHS, however pt refusing   -Start abilify 2mg qAM today   -lorazepam 1mg q8h PRN PO/IM/IV for severe agitation per Psych  -Monitor ECG for QTc evaluation (QTc on 10/11: 490 ms), will recheck tomorrow   -Psych helping to determine whether he is stable for discharge, inpt psych options limited by need for HD No prior psych diagnosis, but per family has had psychotic symptoms for many years.   -s/p thiamine 500mg IV TID x3 days (10/6-10/9)  -Psych c/s appreciated: pt with estella psychosis; cannot leave AMA but no current need for 1:1. Attempting to move to 7S per Psych's request, no bed today.   -continue to offer standing haloperidol 1mg PO qHS, however pt refusing   -Start abilify 2mg qAM today   -lorazepam 1mg q8h PRN PO/IM/IV for severe agitation per Psych  -Monitor ECG for QTc evaluation (QTc on 10/11: 490 ms), will recheck tomorrow   -Psych helping to determine whether he is stable for discharge, inpt psych options limited by need for HD  -10/18 - discussed pt's continuous refusal of meds with his mother/brother, they will try to encourage him to be compliant.

## 2023-10-18 NOTE — BH CONSULTATION LIAISON PROGRESS NOTE - NSBHMSEINTELL_PSY_A_CORE
PAT PASS GIVEN/REVIEWED WITH PT.  VERBALIZED UNDERSTANDING OF THE FOLLOWING:  DO NOT EAT, DRINK, SMOKE, USE SMOKELESS TOBACCO OR CHEW GUM AFTER MIDNIGHT THE NIGHT BEFORE SURGERY.  THIS ALSO INCLUDES HARD CANDIES AND MINTS.    DO NOT SHAVE THE AREA TO BE OPERATED ON AT LEAST 48 HOURS PRIOR TO THE PROCEDURE.  DO NOT WEAR MAKE UP OR NAIL POLISH.  DO NOT LEAVE IN ANY PIERCING OR WEAR JEWELRY THE DAY OF SURGERY.      DO NOT USE ADHESIVES IF YOU WEAR DENTURES.    DO NOT WEAR EYE CONTACTS; BRING IN YOUR GLASSES.    ONLY TAKE MEDICATION THE MORNING OF YOUR PROCEDURE IF INSTRUCTED BY YOUR SURGEON WITH ENOUGH WATER TO SWALLOW THE MEDICATION.  IF YOUR SURGEON DID NOT SPECIFY WHICH MEDICATIONS TO TAKE, YOU WILL NEED TO CALL THEIR OFFICE FOR FURTHER INSTRUCTIONS AND DO AS THEY INSTRUCT.    LEAVE ANYTHING YOU CONSIDER VALUABLE AT HOME.    YOU WILL NEED TO ARRANGE FOR SOMEONE TO DRIVE YOU HOME AFTER SURGERY.  IT IS RECOMMENDED THAT YOU DO NOT DRIVE, WORK, DRINK ALCOHOL OR MAKE MAJOR DECISIONS FOR AT LEAST 24 HOURS AFTER YOUR PROCEDURE IS COMPLETE.      THE DAY OF YOUR PROCEDURE, BRING IN THE FOLLOWING IF APPLICABLE:   PICTURE ID AND INSURANCE/MEDICARE OR MEDICAID CARDS   COPY OF ADVANCED DIRECTIVE/LIVING WILL/POWER OR    CPAP/BIPAP/INHALERS   SKIN PREP SHEET   YOUR PREADMISSION TESTING PASS (IF NOT A PHONE HISTORY)          
Average

## 2023-10-18 NOTE — PROGRESS NOTE ADULT - PROBLEM SELECTOR PLAN 2
Pt with reduced po intake with episodes of hypoglycemia. Pt found to be hoarding food in room, reportedly has similar behaviour at home  -Nutrition consulted, recs appreciated  -Monitor strict calorie count

## 2023-10-18 NOTE — PROGRESS NOTE ADULT - PROBLEM SELECTOR PLAN 7
-CT A/P 10/5: Sequela of chronic pancreatitis with mild common bile duct dilatation unchanged. The prior peripancreatic foci of air have resolved.  -pain management c/s appreciated: gabapentin 100 bedtime; oxy 10 mg PO q4 prn; lidocaine patch x5d; outpt f/u  -Chronic pain service consulted (now signed off) - pain currently well controlled on current regimen - previous team contacted pt's PCP Dr. Hector Odom, who recommended discharge on oxycodone 10mg Q4H with plan for slow outpatient taper  -c/w pancrelipase

## 2023-10-18 NOTE — BH CONSULTATION LIAISON PROGRESS NOTE - NSBHMSETHTCONTENT_PSY_A_CORE
Delusions
Delusions/Ideas of reference
Delusions
Preoccupations/Other
Other
Delusions/Ideas of reference
Delusions/Other

## 2023-10-18 NOTE — PROGRESS NOTE ADULT - ASSESSMENT
63 year old man ESKD on HD, admitted with psychosis, found also to have a subacute/chronic infarct     ESRD- seen on dialysis today.   BP has been running low  reduce UF to 1 L ( No edema/ lungs clear)  would like BP to not drop considering subacute infarct- recommend reducing losartan to 12.5 mg daily and giving losartan and metoprolol only after dialysis on dialysis days   Hb- 9.5. Monitor. Like to keep off EMMANUEL for now considering stroke.   next dialysis 10/20    sudha tanner  nephrology attending   please contact me on TEAMS   Office- [phone]

## 2023-10-18 NOTE — BH CONSULTATION LIAISON PROGRESS NOTE - OTHER
Patient was initially cooperative during the interview but then refused to complete the interview after becoming agitated Appears older than actual age Patient severely preoccupied about food

## 2023-10-18 NOTE — BH CONSULTATION LIAISON PROGRESS NOTE - NSBHCHARTREVIEWVS_PSY_A_CORE FT
Vital Signs Last 24 Hrs  T(C): 36.7 (10 Oct 2023 07:50), Max: 37 (09 Oct 2023 23:33)  T(F): 98.1 (10 Oct 2023 07:50), Max: 98.6 (09 Oct 2023 23:33)  HR: 98 (10 Oct 2023 07:50) (79 - 99)  BP: 138/87 (10 Oct 2023 07:50) (129/83 - 149/80)  BP(mean): --  RR: 18 (10 Oct 2023 07:50) (16 - 18)  SpO2: 100% (10 Oct 2023 07:50) (99% - 100%)    Parameters below as of 10 Oct 2023 07:50  Patient On (Oxygen Delivery Method): room air    
Vital Signs Last 24 Hrs  T(C): 36.4 (13 Oct 2023 05:00), Max: 37 (12 Oct 2023 17:46)  T(F): 97.5 (13 Oct 2023 05:00), Max: 98.6 (12 Oct 2023 17:46)  HR: 88 (13 Oct 2023 05:00) (87 - 98)  BP: 137/75 (13 Oct 2023 05:00) (121/72 - 137/75)  BP(mean): --  RR: 19 (13 Oct 2023 05:00) (18 - 20)  SpO2: 100% (13 Oct 2023 05:00) (100% - 100%)    Parameters below as of 13 Oct 2023 05:00  Patient On (Oxygen Delivery Method): room air    
Vital Signs Last 24 Hrs  T(C): 36.4 (09 Oct 2023 12:27), Max: 36.8 (09 Oct 2023 06:40)  T(F): 97.5 (09 Oct 2023 12:27), Max: 98.3 (09 Oct 2023 06:40)  HR: 79 (09 Oct 2023 12:27) (71 - 90)  BP: 139/74 (09 Oct 2023 12:27) (118/71 - 152/88)  BP(mean): --  RR: 17 (09 Oct 2023 12:27) (16 - 18)  SpO2: 100% (09 Oct 2023 12:27) (100% - 100%)    Parameters below as of 09 Oct 2023 12:27  Patient On (Oxygen Delivery Method): room air
Vital Signs Last 24 Hrs  T(C): 36.9 (12 Oct 2023 09:35), Max: 37.1 (11 Oct 2023 22:00)  T(F): 98.4 (12 Oct 2023 09:35), Max: 98.8 (11 Oct 2023 22:00)  HR: 90 (12 Oct 2023 09:35) (90 - 93)  BP: 120/73 (12 Oct 2023 09:35) (115/60 - 127/68)  BP(mean): --  RR: 18 (12 Oct 2023 09:35) (18 - 18)  SpO2: 100% (12 Oct 2023 09:35) (99% - 100%)    Parameters below as of 12 Oct 2023 09:35  Patient On (Oxygen Delivery Method): room air    
Vital Signs Last 24 Hrs  T(C): 36.4 (18 Oct 2023 10:40), Max: 36.9 (18 Oct 2023 06:00)  T(F): 97.6 (18 Oct 2023 10:40), Max: 98.4 (18 Oct 2023 06:00)  HR: 77 (18 Oct 2023 10:40) (67 - 87)  BP: 136/73 (18 Oct 2023 10:40) (118/50 - 136/73)  BP(mean): --  RR: 18 (18 Oct 2023 10:40) (18 - 19)  SpO2: 100% (18 Oct 2023 10:40) (100% - 100%)    Parameters below as of 18 Oct 2023 10:40  Patient On (Oxygen Delivery Method): room air    
Vital Signs Last 24 Hrs  T(C): 36.3 (17 Oct 2023 09:43), Max: 37.1 (16 Oct 2023 16:43)  T(F): 97.4 (17 Oct 2023 09:43), Max: 98.7 (16 Oct 2023 16:43)  HR: 69 (17 Oct 2023 09:43) (69 - 91)  BP: 135/75 (17 Oct 2023 09:43) (91/55 - 135/75)  BP(mean): --  RR: 18 (17 Oct 2023 09:43) (18 - 18)  SpO2: 100% (17 Oct 2023 09:43) (100% - 100%)    Parameters below as of 17 Oct 2023 09:43  Patient On (Oxygen Delivery Method): room air    
Vital Signs Last 24 Hrs  T(C): 36.2 (16 Oct 2023 10:25), Max: 36.8 (15 Oct 2023 22:00)  T(F): 97.2 (16 Oct 2023 10:25), Max: 98.3 (15 Oct 2023 22:00)  HR: 77 (16 Oct 2023 10:25) (77 - 89)  BP: 107/56 (16 Oct 2023 10:25) (107/56 - 151/86)  BP(mean): --  RR: 18 (16 Oct 2023 10:25) (18 - 19)  SpO2: 97% (16 Oct 2023 05:30) (97% - 100%)    Parameters below as of 16 Oct 2023 10:25  Patient On (Oxygen Delivery Method): room air

## 2023-10-18 NOTE — PROGRESS NOTE ADULT - SUBJECTIVE AND OBJECTIVE BOX
[facility] Division of Kidney Diseases & Hypertension  FOLLOW UP NOTE  --------------------------------------------------------------------------------  Chief Complaint:    24 hour events/subjective:    seen on dialysis  BP has been /60s        PAST HISTORY  --------------------------------------------------------------------------------  No significant changes to PMH, PSH, FHx, SHx, unless otherwise noted    ALLERGIES & MEDICATIONS  --------------------------------------------------------------------------------  Allergies    Tylenol (Other)    Intolerances      Standing Inpatient Medications  aspirin enteric coated 81 milliGRAM(s) Oral daily  atorvastatin 40 milliGRAM(s) Oral at bedtime  chlorhexidine 2% Cloths 1 Application(s) Topical daily  dextrose 5%. 1000 milliLiter(s) IV Continuous <Continuous>  dextrose 5%. 1000 milliLiter(s) IV Continuous <Continuous>  dextrose 50% Injectable 25 Gram(s) IV Push once  dextrose 50% Injectable 12.5 Gram(s) IV Push once  dextrose 50% Injectable 25 Gram(s) IV Push once  gabapentin 100 milliGRAM(s) Oral at bedtime  glucagon  Injectable 1 milliGRAM(s) IntraMuscular once  haloperidol     Tablet 1 milliGRAM(s) Oral at bedtime  heparin   Injectable 5000 Unit(s) SubCutaneous every 12 hours  losartan 25 milliGRAM(s) Oral daily  metoprolol succinate ER 25 milliGRAM(s) Oral daily  multivitamin 1 Tablet(s) Oral daily  pancrelipase  (CREON  6,000 Lipase Units) 1 Capsule(s) Oral three times a day with meals  polyethylene glycol 3350 17 Gram(s) Oral daily  senna 2 Tablet(s) Oral at bedtime  vitamin E 200 International Unit(s) Oral daily    PRN Inpatient Medications  dextrose Oral Gel 15 Gram(s) Oral once PRN  LORazepam   Injectable 1 milliGRAM(s) IV Push once PRN  LORazepam   Injectable 1 milliGRAM(s) IV Push every 8 hours PRN  oxyCODONE    IR 10 milliGRAM(s) Oral every 4 hours PRN      VITALS/PHYSICAL EXAM  --------------------------------------------------------------------------------  T(C): 36.5 (10-18-23 @ 06:55), Max: 36.9 (10-18-23 @ 06:00)  HR: 78 (10-18-23 @ 06:55) (69 - 87)  BP: 128/75 (10-18-23 @ 06:55) (118/50 - 135/75)  RR: 18 (10-18-23 @ 06:55) (18 - 19)  SpO2: 100% (10-18-23 @ 06:55) (100% - 100%)  Wt(kg): --        Physical Exam:  	Gen: NAD  	HEENT: anicteric  	Pulm: CTA B/L  	CV: S1S2; no JVD  	Abd: soft abdomen  	: no suprapubic tenderness  	Extremities: dry   	Neuro: awake  	Skin: dry   	Vascular access: DARYL WILLIS    LABS/STUDIES  --------------------------------------------------------------------------------              9.5    5.17  >-----------<  194      [10-18-23 @ 07:19]              29.9     140  |  103  |  59  ----------------------------<  126      [10-18-23 @ 07:19]  4.7   |  22  |  4.87        Ca     8.1     [10-18-23 @ 07:19]      Mg     1.90     [10-18-23 @ 07:19]      Phos  4.7     [10-18-23 @ 07:19]    TPro  5.8  /  Alb  3.2  /  TBili  0.3  /  DBili  x   /  AST  44  /  ALT  73  /  AlkPhos  233  [10-18-23 @ 07:19]          Creatinine Trend:  SCr 4.87 [10-18 @ 07:19]  SCr 3.73 [10-17 @ 05:10]  SCr 4.56 [10-16 @ 04:50]  SCr 4.04 [10-15 @ 11:04]  SCr 3.04 [10-14 @ 05:59]    Urinalysis - [10-18-23 @ 07:19]      Color  / Appearance  / SG  / pH       Gluc 126 / Ketone   / Bili  / Urobili        Blood  / Protein  / Leuk Est  / Nitrite       RBC  / WBC  / Hyaline  / Gran  / Sq Epi  / Non Sq Epi  / Bacteria       Lipid: chol 96, TG 62, HDL 56, LDL --      [10-12-23 @ 05:00]    HIV Nonreact      [10-18-23 @ 07:19]    Syphilis Screen (Treponema Pallidum Ab) Negative      [10-16-23 @ 10:10]

## 2023-10-18 NOTE — BH CONSULTATION LIAISON PROGRESS NOTE - NSBHASSESSMENTFT_PSY_ALL_CORE
63-yo AAM, single, reportedly domiciled with his mother, patient denying any prior psychiatric history (was treated in assisted 25 years ago with haldol for unknown symptoms/diagnosis and never followed up after release from assisted) and history of alcohol abuse (reports quitting "long time ago"), PMH of HCV, Hx of esophageal stricture (S/p stenting in 5/22), Emphysema, chronic pancreatitis, multiple prior medical hospitalizations for confusion, pancreatitis, etc., psych c/s for psychosis.    Patient exhibiting estella psychosis - has delusions, paranoia, referential thoughts, thought insertion, grandiose/hyperreligiousness. Given CKD and HD, concern for lewy body dementia and avoidance of EPS, would begin Olanzapine though must monitor QTc and LFTs which are already somewhat elevated. Needs further psych assessment. Cognitively intact despite worse psychosis than prior hospitalizations. No current signs of etoh withdrawal despite 0 etoh, no current role for CIWA unless such features abruptly begin.     10/9: Patient was calm but continues to exhibit estella psychosis. These features were similar in content to the examination on 10/6, particularly in regard to delusions, referential thoughts, thought insertion, and grandiose ideology. Patient found to have subacute/chronic L frontal lobe cortical infarct on CT head that was new compared to scan in August of 2023. QTc prolonged.    10/10: Patient was calm with no significant alterations in his state of psychosis. These features reflected those observed on previous examinations, with delusions, referential thoughts, though insertion, and grandiose ideology. Repeat EKG on 10/9 demonstrated a QTc of 462 ms. Patient can be started on standing haloperidol 1mg PO qHS at night for psychosis due to minimal interference with platelet function in the setting of consistent thrombocytopenia.     10/12: Patient denied all bedtime medications on 10/11, including haloperidol. Patient was initially calm but became upset during a discussion regarding the use of haloperidol. Repeat EKG on 10/11 demonstrated a QTc of 490 ms. Patient should be offered haloperidol 1mg PO qHS at night for continued psychosis.    10/13: Patient did not take haloperidol on 10/12. Patient was initially calm but became agitated when he suspected the providers did not believe that his delusions were based in reality. Patient should be offered haloperidol 1mg PO qHS at night for continued psychosis. Since it is unclear at times if patient is eating or spitting out food, recommend strict calorie counting to determine if patient is meeting nutritional goals, as patient's appetite and eating habits do not align with body habitus.    10/16: Patient continues to refuse haloperidol and other medications. Patient continues to nidia food in bed and cabinets. Per chart, this behavior has been associated with food insecurity at home, but other team members report that this behavior has occurred at home as well. Recommend limiting patient's meals to normal feeding times if this does not cause the patient to become agitated.     10/17: Patient continues to refuse haloperidol and some other medications. Patient actively portioning food into containers/cups during the interview. Patient states that the staff "threw out my stuff" in reference to his stashed food.     10/18: Patient took morning meds on 10/18 but has continued to refuse haloperidol. Patient continues to be extremely preoccupied with food. Patient becomes irritated by routine orientation questions and refuses to continue with the interview. Per discussion with patient's brother, patient is able to routinely attend dialysis sessions at home.    Recs:  - no current need for 1:1, would recommend transfer to [address] [place] [address]   - Continue to offer Abilify 2mg qAM - Patient may refuse   - Repeat EKG on 10/9 demonstrated QTc of 462 ms; repeat EKG on 10/11 demonstrated QTc of 490 ms  - Recommend lorazepam 1mg q8h PRN PO/IM/IV for severe agitation - Will not prolong QTc interval  - Discontinue PRN severe agitation Olanzapine 2.5mg q6h PRN PO/IM  - Continue to offer standing haloperidol 1mg PO qHS at night *Please continue to monitor QTc interval and HOLD for QTc >500 ms*  - Recommend strict calorie counting to determine if patient is meeting nutritional goals - Patient's appetite and eating habits do not align with body habitus  - Recommend limiting patient's meals to normal feeding times if patient does not become excessively agitated   - s/p empiric thiamine repletion 500mg IV TID for 3d (completed 10/9)  - CT head with subacute/chronic L frontal lobe cortical infarct, CTA head/neck with no large vessel occlusion or stenosis - patient's mental status much worse than prior hospitalizations  - HIV (negative), Treponema pallidum ab (negative)  - Psych will follow    Dispo: Pending additional evaluation of patient's ability to care for basic needs outside of the hospital setting 63-yo AAM, single, reportedly domiciled with his mother, patient denying any prior psychiatric history (was treated in long term 25 years ago with haldol for unknown symptoms/diagnosis and never followed up after release from long term) and history of alcohol abuse (reports quitting "long time ago"), PMH of HCV, Hx of esophageal stricture (S/p stenting in 5/22), Emphysema, chronic pancreatitis, multiple prior medical hospitalizations for confusion, pancreatitis, etc., psych c/s for psychosis.    Patient exhibiting estella psychosis - has delusions, paranoia, referential thoughts, thought insertion, grandiose/hyperreligiousness. Given CKD and HD, concern for lewy body dementia and avoidance of EPS, would begin Olanzapine though must monitor QTc and LFTs which are already somewhat elevated. Needs further psych assessment. Cognitively intact despite worse psychosis than prior hospitalizations. No current signs of etoh withdrawal despite 0 etoh, no current role for CIWA unless such features abruptly begin.     10/9: Patient was calm but continues to exhibit estella psychosis. These features were similar in content to the examination on 10/6, particularly in regard to delusions, referential thoughts, thought insertion, and grandiose ideology. Patient found to have subacute/chronic L frontal lobe cortical infarct on CT head that was new compared to scan in August of 2023. QTc prolonged.    10/10: Patient was calm with no significant alterations in his state of psychosis. These features reflected those observed on previous examinations, with delusions, referential thoughts, though insertion, and grandiose ideology. Repeat EKG on 10/9 demonstrated a QTc of 462 ms. Patient can be started on standing haloperidol 1mg PO qHS at night for psychosis due to minimal interference with platelet function in the setting of consistent thrombocytopenia.     10/12: Patient denied all bedtime medications on 10/11, including haloperidol. Patient was initially calm but became upset during a discussion regarding the use of haloperidol. Repeat EKG on 10/11 demonstrated a QTc of 490 ms. Patient should be offered haloperidol 1mg PO qHS at night for continued psychosis.    10/13: Patient did not take haloperidol on 10/12. Patient was initially calm but became agitated when he suspected the providers did not believe that his delusions were based in reality. Patient should be offered haloperidol 1mg PO qHS at night for continued psychosis. Since it is unclear at times if patient is eating or spitting out food, recommend strict calorie counting to determine if patient is meeting nutritional goals, as patient's appetite and eating habits do not align with body habitus.    10/16: Patient continues to refuse haloperidol and other medications. Patient continues to nidia food in bed and cabinets. Per chart, this behavior has been associated with food insecurity at home, but other team members report that this behavior has occurred at home as well. Recommend limiting patient's meals to normal feeding times if this does not cause the patient to become agitated.     10/17: Patient continues to refuse haloperidol and some other medications. Patient actively portioning food into containers/cups during the interview. Patient states that the staff "threw out my stuff" in reference to his stashed food.     10/18: Patient took morning meds on 10/18 but has continued to refuse haloperidol. Patient continues to be extremely preoccupied with food. Patient becomes irritated by routine orientation questions and refuses to continue with the interview. Per discussion with patient's brother, patient is able to routinely attend dialysis sessions at home.    Recs:  - no current need for 1:1, would recommend transfer to [address] [place] [address]   - Continue to offer Abilify 2mg qAM - Patient may refuse. *Please continue to monitor QTc interval and HOLD for QTc >500 ms*  - Repeat EKG on 10/9 demonstrated QTc of 462 ms; repeat EKG on 10/11 demonstrated QTc of 490 ms  - Recommend lorazepam 1mg q8h PRN PO/IM/IV for severe agitation - Will not prolong QTc interval  - Discontinue PRN severe agitation Olanzapine 2.5mg q6h PRN PO/IM  - Recommend strict calorie counting to determine if patient is meeting nutritional goals - Patient's appetite and eating habits do not align with body habitus  - Recommend limiting patient's meals to normal feeding times if patient does not become excessively agitated   - s/p empiric thiamine repletion 500mg IV TID for 3d (completed 10/9)  - CT head with subacute/chronic L frontal lobe cortical infarct, CTA head/neck with no large vessel occlusion or stenosis - patient's mental status much worse than prior hospitalizations  - HIV (negative), Treponema pallidum ab (negative)  Dispo: Per primary team. Patient is psychiatrically cleared.

## 2023-10-18 NOTE — PROVIDER CONTACT NOTE (MEDICATION) - SITUATION
Pt [id number] refusing to take his medication despite encouragement, stating that he's allergic to them (only allergy listed is tylenol). only medication his taking (according to pt) is heparin and vitamins. it was (from th previous unit) recorded on worklist manager that the pt was refusing meds

## 2023-10-18 NOTE — BH CONSULTATION LIAISON PROGRESS NOTE - NSBHFUPINTERVALHXFT_PSY_A_CORE
Chart reviewed. Vital signs stable. Patient has been only taking heparin and oxycodone since 10/15, but took morning meds on 10/18. Patient continues to refuse haloperidol. Patient has not required lorazepam PRN since 10/10. Patient seen and examined at bedside with resident, Dr. Rodriguez. Patient was resting in bed and meticulously cutting his food. Patient states he did not have breakfast this morning because he wasn't hungry. Patient did not eat his meal during the interview. Patient denies any issues during his dialysis session this morning. Patient reports his mood is "okay." Patient denies any issues with eating or swallowing, but notes he is always hungry. Patient remains fixated on food and reports that the kitchen does not give him everything he requests. Patient states he typically receives rides to and from dialysis at home. Patient denies any issues with attending routine dialysis appointments at home. Patient currently denies any SIIP, HIIP, AVH, or paranoia. Patient expresses frustration when the examiner brings up a discussion with the patient's brother and states "he doesn't need to know my business." Patient becomes visibly irritated by routine orientation questions and refuses to provide his name. Patient states the examiners "ask questions you already know the answers to." Patient refuses to respond to additional orientation questions.     Per discussion with patient's brother on 10/18: Patient's adult son and mother are currently living at home together. Patient's adult son has helped the patient and the patient's mother with tasks, including assisting with attending dialysis appointments. Patient generally does not have significant issues with attending dialysis appointments because he uses a car service to transport him to and from appointments. Patient's brother believes the patient would be better off in a nursing home, but the patient has resided in these facilities in the past and ends up returning home after feeling better. Patient's brother believes the patient would be able to consistently attend dialysis appointments at home, given his social support network.

## 2023-10-18 NOTE — PROGRESS NOTE ADULT - PROBLEM SELECTOR PLAN 3
Asymptomatic  - CTH with subacute/chronic L frontal lobe cortical infarct (new compared to Aug 2023)  - Neurology following -- appreciate recs  - S/p CTA H/N 10/11 with no new progression; subacute-chronic L frontal lobe cortical infarct, no large vessel occlusion or major stenosis.  - TTE w/ bubble study 10/11 negative for intracardiac shunt  - Telemetry monitoring per neuro, declining ILR  - C/w atorvastatin 40 mg and ASA 81  - Pt to follow up with Dr. Saenz after discharge: [address] [place] [address]. Call [phone].

## 2023-10-18 NOTE — BH CONSULTATION LIAISON PROGRESS NOTE - NSBHCHARTREVIEWLAB_PSY_A_CORE FT
LABS:             10.5   5.98  )-----------( 155      ( 16 Oct 2023 04:50 )             32.4   10-16    139  |  102  |  53<H>  ----------------------------<  72  4.9   |  24  |  4.56<H>    Ca    7.7<L>      16 Oct 2023 04:50  Phos  4.1     10-16  Mg     2.00     10-16    TPro  5.5<L>  /  Alb  2.9<L>  /  TBili  0.3  /  DBili  x   /  AST  42<H>  /  ALT  77<H>  /  AlkPhos  234<H>  10-16
LABS:             9.5    6.03  )-----------( 84       ( 12 Oct 2023 05:00 )             28.8   10-12    144  |  105  |  35<H>  ----------------------------<  115<H>  4.3   |  26  |  3.72<H>    Ca    8.2<L>      12 Oct 2023 05:00  Phos  3.5     10-12  Mg     1.90     10-12    TPro  4.9<L>  /  Alb  2.5<L>  /  TBili  0.3  /  DBili  x   /  AST  34  /  ALT  57<H>  /  AlkPhos  227<H>  10-12
LABS:             11.2   5.74  )-----------( 58       ( 10 Oct 2023 05:25 )             34.6   10-10    144  |  106  |  34<H>  ----------------------------<  99  3.7   |  24  |  3.86<H>    Ca    7.6<L>      10 Oct 2023 05:25  Phos  2.7     10-10  Mg     1.90     10-10    TPro  5.2<L>  /  Alb  2.9<L>  /  TBili  0.4  /  DBili  x   /  AST  35  /  ALT  72<H>  /  AlkPhos  280<H>  10-10
LABS:             9.5    5.63  )-----------( 52       ( 09 Oct 2023 06:00 )             29.3     10-09    142  |  108<H>  |  44<H>  ----------------------------<  88  3.7   |  19<L>  |  4.74<H>    Ca    7.4<L>      09 Oct 2023 06:00  Phos  3.2     10-09  Mg     2.10     10-09    TPro  4.3<L>  /  Alb  2.3<L>  /  TBili  0.3  /  DBili  x   /  AST  30  /  ALT  62<H>  /  AlkPhos  247<H>  10-09  
LABS:             9.5    5.17  )-----------( 194      ( 18 Oct 2023 07:19 )             29.9   10-18    140  |  103  |  59<H>  ----------------------------<  126<H>  4.7   |  22  |  4.87<H>    Ca    8.1<L>      18 Oct 2023 07:19  Phos  4.7     10-18  Mg     1.90     10-18    TPro  5.8<L>  /  Alb  3.2<L>  /  TBili  0.3  /  DBili  x   /  AST  44<H>  /  ALT  73<H>  /  AlkPhos  233<H>  10-18
LABS:             9.6    5.13  )-----------( 105      ( 13 Oct 2023 05:03 )             29.3   10-13    141  |  102  |  46<H>  ----------------------------<  167<H>  3.9   |  23  |  4.53<H>    Ca    7.4<L>      13 Oct 2023 05:03  Phos  3.4     10-13  Mg     2.00     10-13    TPro  4.7<L>  /  Alb  2.4<L>  /  TBili  0.3  /  DBili  x   /  AST  53<H>  /  ALT  68<H>  /  AlkPhos  225<H>  10-13
LABS:             10.0   5.92  )-----------( 158      ( 17 Oct 2023 05:10 )             30.8   10-17    143  |  105  |  42<H>  ----------------------------<  78  4.2   |  24  |  3.73<H>    Ca    8.0<L>      17 Oct 2023 05:10  Phos  3.9     10-17  Mg     1.90     10-17    TPro  5.4<L>  /  Alb  2.9<L>  /  TBili  0.3  /  DBili  x   /  AST  45<H>  /  ALT  75<H>  /  AlkPhos  229<H>  10-17

## 2023-10-18 NOTE — PROGRESS NOTE ADULT - ASSESSMENT
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 Neurology consulted for head CT findings showing subacute/chronic infarct in frontal lobe. rCTH unchanged. CTA H/N w/o LVO or significant stenosis.   LDL 28  TTE with EF 20-25%    Impression: Patient presented with generalized weakness/pain. Undergoing psychiatric workup for acutely worsening psychosis. Incidentally found to have clinically silent, likely chronic L MCA infarct from embolic stroke of undetermined source, possibly cardioembolic from low EF    Recommendations    [] Cardiology eval for HF, low EF  [] On ASA   [] Atorvastatin 40 mg   [] DVT prophyaxis   [] Lipid Panel, HgA1C  [] Neurochecks qshift  [] appreciate psych recs -> pending transfer to [facility]s  [] BP goals: Normotension  [] HgA1C goals < 7.0  [] Lifestyle modifications: smoking cessation, exercise, and a Mediterranean style diet.   [] Upon discharge, patient should follow up with Dr. Josue  [address] [place] [address]. Call [phone].     Christin Josue DO  Vascular Neurology  Office [phone] .     Please call with questions: [phone]

## 2023-10-18 NOTE — BH CONSULTATION LIAISON PROGRESS NOTE - ADDITIONAL DETAILS / COMMENTS
Patient became significantly agitated during the orientation questions. Patient refused to state his name or respond to any additional questions.

## 2023-10-18 NOTE — BH CONSULTATION LIAISON PROGRESS NOTE - NSBHCONSULTFOLLOWAFTERCARE_PSY_A_CORE FT
Continue to offer Abilify 2mg qAM - Patient may refuse. *Please continue to monitor QTc interval and HOLD for QTc >500 ms* Continue to offer Abilify 2mg qAM - Patient may refuse. *Please continue to monitor QTc interval and HOLD for QTc >500 ms*    [facility]  [address] [place] [address], First Floor, [place] [address]  [phone]  [email or web address]: [phone]

## 2023-10-18 NOTE — PROGRESS NOTE ADULT - SUBJECTIVE AND OBJECTIVE BOX
INTERVAL HISTORY:  No overnight events, doing well      Medications: MEDICATIONS  (STANDING):  ARIPiprazole 2 milliGRAM(s) Oral daily  aspirin enteric coated 81 milliGRAM(s) Oral daily  atorvastatin 40 milliGRAM(s) Oral at bedtime  chlorhexidine 2% Cloths 1 Application(s) Topical daily  dextrose 5%. 1000 milliLiter(s) (100 mL/Hr) IV Continuous <Continuous>  dextrose 5%. 1000 milliLiter(s) (50 mL/Hr) IV Continuous <Continuous>  dextrose 50% Injectable 25 Gram(s) IV Push once  dextrose 50% Injectable 12.5 Gram(s) IV Push once  dextrose 50% Injectable 25 Gram(s) IV Push once  gabapentin 100 milliGRAM(s) Oral at bedtime  glucagon  Injectable 1 milliGRAM(s) IntraMuscular once  haloperidol     Tablet 1 milliGRAM(s) Oral at bedtime  heparin   Injectable 5000 Unit(s) SubCutaneous every 12 hours  Nephro-tay 1 Tablet(s) Oral daily  pancrelipase  (CREON  6,000 Lipase Units) 1 Capsule(s) Oral three times a day with meals  polyethylene glycol 3350 17 Gram(s) Oral daily  senna 2 Tablet(s) Oral at bedtime  vitamin E 200 International Unit(s) Oral daily    MEDICATIONS  (PRN):  dextrose Oral Gel 15 Gram(s) Oral once PRN Blood Glucose LESS THAN 70 milliGRAM(s)/deciliter  LORazepam   Injectable 1 milliGRAM(s) IV Push every 8 hours PRN Agitation  LORazepam   Injectable 1 milliGRAM(s) IV Push once PRN Anxiety  oxyCODONE    IR 10 milliGRAM(s) Oral every 4 hours PRN Mod-severe pain       Vitals:  Vital Signs Last 24 Hrs  T(C): 37 (18 Oct 2023 19:30), Max: 37 (18 Oct 2023 19:30)  T(F): 98.6 (18 Oct 2023 19:30), Max: 98.6 (18 Oct 2023 19:30)  HR: 87 (18 Oct 2023 19:30) (67 - 89)  BP: 127/68 (18 Oct 2023 19:30) (117/59 - 146/84)  BP(mean): 101 (18 Oct 2023 15:00) (101 - 101)  RR: 18 (18 Oct 2023 19:30) (18 - 19)  SpO2: 100% (18 Oct 2023 19:30) (100% - 100%)    Parameters below as of 18 Oct 2023 19:30  Patient On (Oxygen Delivery Method): room air              General:  Constitutional: Male, appears stated age, in no apparent distress including pain  Head: Normocephalic & Atraumatic.  Respiratory: Breathing comfortably.    Neurological:  MS: Eyes open. Awake, alert, oriented to person, place, situation, time. Follows all commands. Pleasant, cooperative demeanor.     Language: Speech is clear, fluent with good repetition & comprehension.    CNs: VFF. EOMI no nystagmus. V1-3 intact to LT b/l. No facial asymmetry b/l, full eye closure strength b/l.     Motor: Normal muscle bulk & tone. No noticeable tremor. No drift of UE or LE b/l.    Sensation: Intact to LT b/l throughout.     Cortical: Extinction on DSS (neglect): none    Coordination: No dysmetria to FTN b/l.     Gait: Deferred.      LABS:                          9.5    5.17  )-----------( 194      ( 18 Oct 2023 07:19 )             29.9     10-18    140  |  103  |  59<H>  ----------------------------<  126<H>  4.7   |  22  |  4.87<H>    Ca    8.1<L>      18 Oct 2023 07:19  Phos  4.7     10-18  Mg     1.90     10-18    TPro  5.8<L>  /  Alb  3.2<L>  /  TBili  0.3  /  DBili  x   /  AST  44<H>  /  ALT  73<H>  /  AlkPhos  233<H>  10-18    LIVER FUNCTIONS - ( 18 Oct 2023 07:19 )  Alb: 3.2 g/dL / Pro: 5.8 g/dL / ALK PHOS: 233 U/L / ALT: 73 U/L / AST: 44 U/L / GGT: x             Urinalysis Basic - ( 18 Oct 2023 07:19 )    Color: x / Appearance: x / SG: x / pH: x  Gluc: 126 mg/dL / Ketone: x  / Bili: x / Urobili: x   Blood: x / Protein: x / Nitrite: x   Leuk Esterase: x / RBC: x / WBC x   Sq Epi: x / Non Sq Epi: x / Bacteria: x          STUDIES & IMAGING:  Studies (EKG, EEG, EMG, etc):     Radiology (XR, CT, MR, U/S, TTE/ISAIAS):    Head CT: A subacute-chronic appearing infarct is again seen within the   left frontal lobe.    Additional chronic lacunar infarcts are again seen within the bilateral   basal ganglia.    There is no acute intracranial hemorrhage, mass effect, shift of the   midline structures, herniation, hydrocephalus, or abnormal intra-axial   fluid collection.    The paranasal sinuses and tympanomastoid cavities are clear. The   calvarium appears intact. The orbits appear unremarkable.    CTA NECK: There is a standard anatomic configuration to the aortic arch.   The origins of the great vessels appear unremarkable.    The bilateral common carotid arteries, carotid bifurcations, and cervical   internal carotid arteries appear grossly unremarkable.    The origins of the bilateral vertebral arteries are normal. The vertebral   arteries are patent throughout the course in the neck.    Numerous collateral venous channels are seen which enhance with contrast   around the spine.    A patchy opacity in the right upper lobe is seen.    CTA HEAD: Mild calcified plaques affect the bilateral carotid siphons   without associated stenosis. Otherwise, the bilateral intracranial   internal carotid, anterior, and middle cerebral arteries appear   unremarkable.    The anterior and bilateral posterior communicating arteries are seen.    The posterior circulation appears patent.    The intracranial venous structures are patent.    IMPRESSION:    Head CT: Stable follow-up CT exam.    CTA neck and head: No large vessel occlusion or major stenosis.    Patchy right upper lobe opacity for which pneumonia is a possibility.   Recommend imaging follow-up to resolution.    --- End of Report ---      CT 10/6:    Multiple chronic bilateral basal ganglia: Infarctions on image   26 of series 2. Small subacute/chronic left frontal lobe cortical infarct   on image 43 of series 2  There is periventricular and subcortical white matter hypodensity without   mass effect, nonspecific, likely representing mild chronic microvascular   ischemic changes. There is no other compelling evidence for an acute   transcortical infarction. There is no evidence of mass, mass effect,   midline shift or extra-axial fluid collection. The lateral ventricles and   cortical sulci are age-appropriate in size and configuration. The orbits,   mastoid air cells and visualized paranasal sinuses are unremarkable. The   calvarium is intact. Consider MRI as clinically warranted.    IMPRESSION: Subacute/chronic left frontal lobe cortical infarct. Consider   MRI for further evaluation.    --- End of Report ---    < from: TTE W or WO Ultrasound Enhancing Agent (10.11.23 @ 16:31) >   1. Left ventricular cavity is normally sized. Left ventricular wall thickness is normal. Left ventricular systolic function is severely decreased with an ejection fraction visually estimated at 20 to 25 %. Global left ventricular hypokinesis.   2. There is no evidence of a left ventricular thrombus.   3. There is mild (grade 1) left ventricular diastolic dysfunction.   4. Normal right ventricular cavity size and systolic function.   5. There is calcification of the mitral valve annulus. There is moderate mitral regurgitation.   6. The aortic valve appears trileaflet with normal systolic excursion. There is calcification of the aortic valve leaflets. There is no aortic valve stenosis. There is moderate aortic regurgitation.   7. Agitated saline injection was negative for intracardiac shunt.   8. No prior echocardiogram is available for comparison.    < end of copied text >

## 2023-10-18 NOTE — PROGRESS NOTE ADULT - PROBLEM SELECTOR PLAN 8
DVT ppx: SQH  Diet: Regular   Dispo: Pending clinical improvement. Inpt psych vs home.   **Cannot AMA**

## 2023-10-18 NOTE — BH CONSULTATION LIAISON PROGRESS NOTE - NSBHATTESTCOMMENTATTENDFT_PSY_A_CORE
agree with above, patient still frankly psychotic but still maintaining safety, QTc prolonged would repeat EKG for QTc, would investigate causes of QTc prolongation as this would continually effect psych mgmt. Also higher doses of olanzapine may worsen thrombocytopenia which has worsened over past few days slightly, would hold for now. Haldol would be least likely to contribute to thrombocytopenia and Abilify would be least likely to contribute to QTc prolongation but would avoid both for now given dual comorbidities, psych will follow. PRNs as above. 
met with the patient, case discussed with ms3, impression and plan discussed and agreed upon.   Patient was standing by the room door when approached. Engaged. Superficially denied any mood or si or hi. Suspecting underlying delusions still present, but denies during this interview.   Will coordinate with family
agree with above, patient's psychosis is much less estella at this time, can try Abilify qAM tomorrow though OK for patient to refuse, patient irritable mostly around food, has been consistantly without SI/HI, future oriented, and is primarily focused on hoarding food so as to bring home, per family on prior collateral patient with some chronic psychotic features which are mostly difficult to detect as patient keeps to himself, currently patient appears to be heading back toward that baseline without intervention though would  benefiit from antipsychotic anyway. Will follow.    Would benefit from transfer to [facility] given milder continued psychosis, agitation around food 
agree with above, patient still likely psychotic but thought process linear or perseverative on food, not extraneous psychotic drives which now appear much less salient. Minimal indication to force patient to take abilify, and as per family patient quite functional despite his oddities at home, has been consistantly without SI/HI or severe agitation, as such does not meet inpt psychiatric involuntary commitment criterion and refuses inpt psych on voluntary basis, no psych c/i to discharge to which patient and family concur. Would benefit from future psych follow up and DC on abilify in the chance that patient does take it, overall medical risk of med is low and psych benefit may be high. No  psych c/i to discharge, would rec SW/CM discuss dispo with patient's son. Psych will follow while inpt. Would still benefit from transfer to [facility].
agree with above, on my exam patient irritable about food but less obviously psychotic, will continue to monitor
agree with above - patient calm, cooperative, accepting medical care, however still delusional, would begin haldol as patient still thrombocytopenic so would avoid serotonergic agents, repeat EKG shows QTc<500. Unclear dispo, patient appears compliant here, unable to reach mother or collateral to determine safety at home. Would consider SW f/u to assess for home safety risk if patient were to be discharge, check for compliance with outpt HD etc. Given dialysis needs patient unlikely to be able to be placed in an inpatient psychiatric facility, would likely have to increase antipsychotic meds here and reassess for toleratbility. Psych will follow.

## 2023-10-18 NOTE — BH CONSULTATION LIAISON PROGRESS NOTE - CURRENT MEDICATION
MEDICATIONS  (STANDING):  aspirin enteric coated 81 milliGRAM(s) Oral daily  atorvastatin 40 milliGRAM(s) Oral at bedtime  chlorhexidine 2% Cloths 1 Application(s) Topical daily  dextrose 5%. 1000 milliLiter(s) (50 mL/Hr) IV Continuous <Continuous>  dextrose 5%. 1000 milliLiter(s) (100 mL/Hr) IV Continuous <Continuous>  dextrose 50% Injectable 25 Gram(s) IV Push once  dextrose 50% Injectable 25 Gram(s) IV Push once  dextrose 50% Injectable 12.5 Gram(s) IV Push once  gabapentin 100 milliGRAM(s) Oral at bedtime  glucagon  Injectable 1 milliGRAM(s) IntraMuscular once  haloperidol     Tablet 1 milliGRAM(s) Oral at bedtime  heparin   Injectable 5000 Unit(s) SubCutaneous every 12 hours  losartan 25 milliGRAM(s) Oral daily  metoprolol succinate ER 25 milliGRAM(s) Oral daily  multivitamin 1 Tablet(s) Oral daily  mupirocin 2% Ointment 1 Application(s) Both Nostrils two times a day  pancrelipase  (CREON  6,000 Lipase Units) 1 Capsule(s) Oral three times a day with meals  polyethylene glycol 3350 17 Gram(s) Oral daily  senna 2 Tablet(s) Oral at bedtime  vitamin E 200 International Unit(s) Oral daily    MEDICATIONS  (PRN):  dextrose Oral Gel 15 Gram(s) Oral once PRN Blood Glucose LESS THAN 70 milliGRAM(s)/deciliter  LORazepam   Injectable 1 milliGRAM(s) IV Push once PRN Anxiety  LORazepam   Injectable 1 milliGRAM(s) IV Push every 8 hours PRN Agitation  oxyCODONE    IR 10 milliGRAM(s) Oral every 4 hours PRN Mod-severe pain  
MEDICATIONS  (STANDING):  atorvastatin 40 milliGRAM(s) Oral at bedtime  chlorhexidine 2% Cloths 1 Application(s) Topical daily  dextrose 5%. 1000 milliLiter(s) (50 mL/Hr) IV Continuous <Continuous>  dextrose 5%. 1000 milliLiter(s) (100 mL/Hr) IV Continuous <Continuous>  dextrose 50% Injectable 25 Gram(s) IV Push once  dextrose 50% Injectable 12.5 Gram(s) IV Push once  dextrose 50% Injectable 25 Gram(s) IV Push once  gabapentin 100 milliGRAM(s) Oral at bedtime  glucagon  Injectable 1 milliGRAM(s) IntraMuscular once  haloperidol     Tablet 1 milliGRAM(s) Oral at bedtime  heparin   Injectable 5000 Unit(s) SubCutaneous every 12 hours  losartan 25 milliGRAM(s) Oral daily  metoprolol succinate ER 25 milliGRAM(s) Oral daily  multivitamin 1 Tablet(s) Oral daily  mupirocin 2% Ointment 1 Application(s) Both Nostrils two times a day  pancrelipase  (CREON  6,000 Lipase Units) 1 Capsule(s) Oral three times a day with meals  polyethylene glycol 3350 17 Gram(s) Oral daily  senna 2 Tablet(s) Oral at bedtime  vitamin E 200 International Unit(s) Oral daily    MEDICATIONS  (PRN):  dextrose Oral Gel 15 Gram(s) Oral once PRN Blood Glucose LESS THAN 70 milliGRAM(s)/deciliter  LORazepam   Injectable 1 milliGRAM(s) IV Push every 8 hours PRN Agitation  LORazepam   Injectable 1 milliGRAM(s) IV Push once PRN Anxiety  oxyCODONE    IR 10 milliGRAM(s) Oral every 4 hours PRN Mod-severe pain  
MEDICATIONS  (STANDING):  atorvastatin 40 milliGRAM(s) Oral at bedtime  chlorhexidine 2% Cloths 1 Application(s) Topical daily  dextrose 5%. 1000 milliLiter(s) (50 mL/Hr) IV Continuous <Continuous>  dextrose 5%. 1000 milliLiter(s) (100 mL/Hr) IV Continuous <Continuous>  dextrose 50% Injectable 25 Gram(s) IV Push once  dextrose 50% Injectable 25 Gram(s) IV Push once  dextrose 50% Injectable 12.5 Gram(s) IV Push once  gabapentin 100 milliGRAM(s) Oral at bedtime  glucagon  Injectable 1 milliGRAM(s) IntraMuscular once  haloperidol     Tablet 1 milliGRAM(s) Oral at bedtime  heparin   Injectable 5000 Unit(s) SubCutaneous every 12 hours  metoprolol succinate ER 25 milliGRAM(s) Oral daily  multivitamin 1 Tablet(s) Oral daily  mupirocin 2% Ointment 1 Application(s) Both Nostrils two times a day  pancrelipase  (CREON  6,000 Lipase Units) 1 Capsule(s) Oral three times a day with meals  polyethylene glycol 3350 17 Gram(s) Oral daily  senna 2 Tablet(s) Oral at bedtime    MEDICATIONS  (PRN):  dextrose Oral Gel 15 Gram(s) Oral once PRN Blood Glucose LESS THAN 70 milliGRAM(s)/deciliter  LORazepam   Injectable 1 milliGRAM(s) IV Push once PRN Anxiety  LORazepam   Injectable 1 milliGRAM(s) IV Push every 8 hours PRN Agitation  oxyCODONE    IR 10 milliGRAM(s) Oral every 4 hours PRN Mod-severe pain  
MEDICATIONS  (STANDING):  aspirin enteric coated 81 milliGRAM(s) Oral daily  atorvastatin 40 milliGRAM(s) Oral at bedtime  chlorhexidine 2% Cloths 1 Application(s) Topical daily  dextrose 5%. 1000 milliLiter(s) (50 mL/Hr) IV Continuous <Continuous>  dextrose 5%. 1000 milliLiter(s) (100 mL/Hr) IV Continuous <Continuous>  dextrose 50% Injectable 25 Gram(s) IV Push once  dextrose 50% Injectable 12.5 Gram(s) IV Push once  dextrose 50% Injectable 25 Gram(s) IV Push once  gabapentin 100 milliGRAM(s) Oral at bedtime  glucagon  Injectable 1 milliGRAM(s) IntraMuscular once  haloperidol     Tablet 1 milliGRAM(s) Oral at bedtime  heparin   Injectable 5000 Unit(s) SubCutaneous every 12 hours  losartan 25 milliGRAM(s) Oral daily  metoprolol succinate ER 25 milliGRAM(s) Oral daily  multivitamin 1 Tablet(s) Oral daily  pancrelipase  (CREON  6,000 Lipase Units) 1 Capsule(s) Oral three times a day with meals  polyethylene glycol 3350 17 Gram(s) Oral daily  senna 2 Tablet(s) Oral at bedtime  vitamin E 200 International Unit(s) Oral daily    MEDICATIONS  (PRN):  dextrose Oral Gel 15 Gram(s) Oral once PRN Blood Glucose LESS THAN 70 milliGRAM(s)/deciliter  LORazepam   Injectable 1 milliGRAM(s) IV Push every 8 hours PRN Agitation  LORazepam   Injectable 1 milliGRAM(s) IV Push once PRN Anxiety  oxyCODONE    IR 10 milliGRAM(s) Oral every 4 hours PRN Mod-severe pain  
MEDICATIONS  (STANDING):  chlorhexidine 2% Cloths 1 Application(s) Topical daily  gabapentin 100 milliGRAM(s) Oral at bedtime  heparin   Injectable 5000 Unit(s) SubCutaneous every 12 hours  lidocaine   4% Patch 1 Patch Transdermal daily  multivitamin 1 Tablet(s) Oral daily  pancrelipase  (CREON  6,000 Lipase Units) 1 Capsule(s) Oral three times a day with meals  polyethylene glycol 3350 17 Gram(s) Oral daily  senna 2 Tablet(s) Oral at bedtime    MEDICATIONS  (PRN):  oxyCODONE    IR 10 milliGRAM(s) Oral every 4 hours PRN Mod-severe pain  
MEDICATIONS  (STANDING):  ARIPiprazole 2 milliGRAM(s) Oral daily  aspirin enteric coated 81 milliGRAM(s) Oral daily  atorvastatin 40 milliGRAM(s) Oral at bedtime  chlorhexidine 2% Cloths 1 Application(s) Topical daily  dextrose 5%. 1000 milliLiter(s) (50 mL/Hr) IV Continuous <Continuous>  dextrose 5%. 1000 milliLiter(s) (100 mL/Hr) IV Continuous <Continuous>  dextrose 50% Injectable 25 Gram(s) IV Push once  dextrose 50% Injectable 12.5 Gram(s) IV Push once  dextrose 50% Injectable 25 Gram(s) IV Push once  gabapentin 100 milliGRAM(s) Oral at bedtime  glucagon  Injectable 1 milliGRAM(s) IntraMuscular once  haloperidol     Tablet 1 milliGRAM(s) Oral at bedtime  heparin   Injectable 5000 Unit(s) SubCutaneous every 12 hours  Nephro-tay 1 Tablet(s) Oral daily  pancrelipase  (CREON  6,000 Lipase Units) 1 Capsule(s) Oral three times a day with meals  polyethylene glycol 3350 17 Gram(s) Oral daily  senna 2 Tablet(s) Oral at bedtime  vitamin E 200 International Unit(s) Oral daily    MEDICATIONS  (PRN):  dextrose Oral Gel 15 Gram(s) Oral once PRN Blood Glucose LESS THAN 70 milliGRAM(s)/deciliter  LORazepam   Injectable 1 milliGRAM(s) IV Push every 8 hours PRN Agitation  LORazepam   Injectable 1 milliGRAM(s) IV Push once PRN Anxiety  oxyCODONE    IR 10 milliGRAM(s) Oral every 4 hours PRN Mod-severe pain  
MEDICATIONS  (STANDING):  chlorhexidine 2% Cloths 1 Application(s) Topical daily  gabapentin 100 milliGRAM(s) Oral at bedtime  heparin   Injectable 5000 Unit(s) SubCutaneous every 12 hours  lidocaine   4% Patch 1 Patch Transdermal daily  multivitamin 1 Tablet(s) Oral daily  pancrelipase  (CREON  6,000 Lipase Units) 1 Capsule(s) Oral three times a day with meals  polyethylene glycol 3350 17 Gram(s) Oral daily  senna 2 Tablet(s) Oral at bedtime    MEDICATIONS  (PRN):  LORazepam     Tablet 1 milliGRAM(s) Oral every 8 hours PRN Agitation  LORazepam   Injectable 1 milliGRAM(s) IV Push once PRN Anxiety  oxyCODONE    IR 10 milliGRAM(s) Oral every 4 hours PRN Mod-severe pain

## 2023-10-18 NOTE — BH CONSULTATION LIAISON PROGRESS NOTE - NSBHATTESTTYPEVISIT_PSY_A_CORE
Attending with Resident/Fellow/Student
Resident/Fellow with telephonic supervision
Attending with Resident/Fellow/Student

## 2023-10-18 NOTE — BH CONSULTATION LIAISON PROGRESS NOTE - NSBHMSEPERCEPT_PSY_A_CORE
Other
Auditory hallucinations
No abnormalities
Auditory hallucinations

## 2023-10-18 NOTE — PROGRESS NOTE ADULT - ASSESSMENT
63M with PMH of HTN, ESRD on HD, alcohol use disorder, chronic pancreatitis, HCV (s/p tx, most recent VL negative), COPD not on home O2, h/o lung abscess (Pseudomonas/Morgenella), esophageal stricture (s/p stenting 5/2022), RUE DVT (1/2022, no longer on AC), recent hospitalization for possible opioid overdose requiring intubation p/w acute on chronic abdominal pain in setting of running out of pain medications at home and PCP reluctant to prescribe more given recent possible OD. Found to have new/worsening psychosis – psychiatry consulted and as part of this workup received CTH, which showed L frontal lobe cortical subacute/chronic infarct. C/c/b hypoglycemia and dec po intake.

## 2023-10-18 NOTE — BH CONSULTATION LIAISON PROGRESS NOTE - NSBHTIMEACTIVITIESPERFORMED_PSY_A_CORE
d/w patient, team 
d/w patient, nursing 
d/w patient, team, nursing 
d/w patient, nursing, reviewed collateral
d/w team, patient, staff

## 2023-10-18 NOTE — BH CONSULTATION LIAISON PROGRESS NOTE - NSBHPTASSESSDT_PSY_A_CORE
13-Oct-2023 12:28
16-Oct-2023 16:41
10-Oct-2023 11:15
09-Oct-2023 15:04
12-Oct-2023 13:05
18-Oct-2023 13:28
17-Oct-2023 12:19

## 2023-10-18 NOTE — PROGRESS NOTE ADULT - SUBJECTIVE AND OBJECTIVE BOX
**********************************************  [facility] Division of Hospital Medicine  Roslyn Bernard MD  Available via MS Teams  Pager: [phone]  **********************************************     Patient is a 63y old  Male who presents with a chief complaint of chronic pancreatitis pain (18 Oct 2023 09:42)    SUBJECTIVE / OVERNIGHT EVENTS: No acute events overnight. Patient examined at bedside this AM, transferring food into disposable containers. Asked why he's not eating the food, states it's because he's not getting the food he likes. Asked him if his family can bring him food he likes, he said they can't. Denies hoarding food. Refusing to take haldol, says he's severely allergic to psych meds but does not elaborate. Denies CP, palpitations, SOB, n/v/d, abdominal pain.     OBJECTIVE:  Vital Signs Last 24 Hrs  T(C): 36.6 (18 Oct 2023 15:00), Max: 36.9 (18 Oct 2023 06:00)  T(F): 97.9 (18 Oct 2023 15:00), Max: 98.4 (18 Oct 2023 06:00)  HR: 89 (18 Oct 2023 15:00) (67 - 89)  BP: 146/84 (18 Oct 2023 15:00) (118/50 - 146/84)  BP(mean): 101 (18 Oct 2023 15:00) (101 - 101)  RR: 18 (18 Oct 2023 15:00) (18 - 19)  SpO2: 100% (18 Oct 2023 15:00) (100% - 100%)    Parameters below as of 18 Oct 2023 15:00  Patient On (Oxygen Delivery Method): room air        I&O's Summary    18 Oct 2023 07:01  -  18 Oct 2023 15:13  --------------------------------------------------------  IN: 400 mL / OUT: 800 mL / NET: -400 mL    Physical Exam:     General: No acute distress, frail appearing male     Eyes: PERRL, EOMI     ENT: MMM, no oropharyngeal lesions or erythema appreciated     Pulm: No increased WOB. CTAB. No wheezing.     CV: RRR. S1&S2+. No M/R/G appreciated.     Abdomen: +BS. Soft, NTND. No organomegaly.     MSK: Nml ROM    Extremities: No peripheral edema or cyanosis.     Neuro: A&Ox2, no focal deficits     Skin: Warm and dry. No visible rash.       Labs:  CAPILLARY BLOOD GLUCOSE      POCT Blood Glucose.: 82 mg/dL (18 Oct 2023 11:32)  POCT Blood Glucose.: 137 mg/dL (18 Oct 2023 09:40)  POCT Blood Glucose.: 78 mg/dL (18 Oct 2023 09:10)  POCT Blood Glucose.: 129 mg/dL (18 Oct 2023 06:31)  POCT Blood Glucose.: 74 mg/dL (18 Oct 2023 05:22)  POCT Blood Glucose.: 74 mg/dL (18 Oct 2023 03:19)  POCT Blood Glucose.: 71 mg/dL (17 Oct 2023 21:06)  POCT Blood Glucose.: 58 mg/dL (17 Oct 2023 21:04)  POCT Blood Glucose.: 159 mg/dL (17 Oct 2023 16:46)                          9.5    5.17  )-----------( 194      ( 18 Oct 2023 07:19 )             29.9     10-18    140  |  103  |  59<H>  ----------------------------<  126<H>  4.7   |  22  |  4.87<H>    Ca    8.1<L>      18 Oct 2023 07:19  Phos  4.7     10-18  Mg     1.90     10-18    TPro  5.8<L>  /  Alb  3.2<L>  /  TBili  0.3  /  DBili  x   /  AST  44<H>  /  ALT  73<H>  /  AlkPhos  233<H>  10-18            Urinalysis Basic - ( 18 Oct 2023 07:19 )    Color: x / Appearance: x / SG: x / pH: x  Gluc: 126 mg/dL / Ketone: x  / Bili: x / Urobili: x   Blood: x / Protein: x / Nitrite: x   Leuk Esterase: x / RBC: x / WBC x   Sq Epi: x / Non Sq Epi: x / Bacteria: x      Imaging Personally Reviewed:      MEDICATIONS  (STANDING):  ARIPiprazole 2 milliGRAM(s) Oral daily  aspirin enteric coated 81 milliGRAM(s) Oral daily  atorvastatin 40 milliGRAM(s) Oral at bedtime  chlorhexidine 2% Cloths 1 Application(s) Topical daily  dextrose 5%. 1000 milliLiter(s) (50 mL/Hr) IV Continuous <Continuous>  dextrose 5%. 1000 milliLiter(s) (100 mL/Hr) IV Continuous <Continuous>  dextrose 50% Injectable 25 Gram(s) IV Push once  dextrose 50% Injectable 12.5 Gram(s) IV Push once  dextrose 50% Injectable 25 Gram(s) IV Push once  gabapentin 100 milliGRAM(s) Oral at bedtime  glucagon  Injectable 1 milliGRAM(s) IntraMuscular once  haloperidol     Tablet 1 milliGRAM(s) Oral at bedtime  heparin   Injectable 5000 Unit(s) SubCutaneous every 12 hours  Nephro-tay 1 Tablet(s) Oral daily  pancrelipase  (CREON  6,000 Lipase Units) 1 Capsule(s) Oral three times a day with meals  polyethylene glycol 3350 17 Gram(s) Oral daily  senna 2 Tablet(s) Oral at bedtime  vitamin E 200 International Unit(s) Oral daily    MEDICATIONS  (PRN):  dextrose Oral Gel 15 Gram(s) Oral once PRN Blood Glucose LESS THAN 70 milliGRAM(s)/deciliter  LORazepam   Injectable 1 milliGRAM(s) IV Push every 8 hours PRN Agitation  LORazepam   Injectable 1 milliGRAM(s) IV Push once PRN Anxiety  oxyCODONE    IR 10 milliGRAM(s) Oral every 4 hours PRN Mod-severe pain

## 2023-10-18 NOTE — BH CONSULTATION LIAISON PROGRESS NOTE - NSBHCONSULTMEDSEVERE_PSY_A_CORE FT
Lorazepam 1mg q8h PRN PO/IM/IV for severe agitation

## 2023-10-18 NOTE — BH CONSULTATION LIAISON PROGRESS NOTE - NSBHHPIREASONCLOTHER_PSY_A_CORE FT
delusions, paranoia

## 2023-10-18 NOTE — BH CONSULTATION LIAISON PROGRESS NOTE - NSBHCONSULTMEDPRNREASON_PSY_A_CORE
agitation...
severe agitation...

## 2023-10-19 NOTE — PROGRESS NOTE ADULT - SUBJECTIVE AND OBJECTIVE BOX
[facility] [place] Division of Kidney Diseases & Hypertension  FOLLOW UP NOTE  --------------------------------------------------------------------------------  Chief Complaint:    24 hour events/subjective:    Feels okay        PAST HISTORY  --------------------------------------------------------------------------------  No significant changes to PMH, PSH, FHx, SHx, unless otherwise noted    ALLERGIES & MEDICATIONS  --------------------------------------------------------------------------------  Allergies    Tylenol (Other)    Intolerances      Standing Inpatient Medications  ARIPiprazole 2 milliGRAM(s) Oral daily  aspirin enteric coated 81 milliGRAM(s) Oral daily  atorvastatin 40 milliGRAM(s) Oral at bedtime  chlorhexidine 2% Cloths 1 Application(s) Topical daily  dextrose 5%. 1000 milliLiter(s) IV Continuous <Continuous>  dextrose 5%. 1000 milliLiter(s) IV Continuous <Continuous>  dextrose 50% Injectable 25 Gram(s) IV Push once  dextrose 50% Injectable 12.5 Gram(s) IV Push once  dextrose 50% Injectable 25 Gram(s) IV Push once  gabapentin 100 milliGRAM(s) Oral at bedtime  glucagon  Injectable 1 milliGRAM(s) IntraMuscular once  haloperidol     Tablet 1 milliGRAM(s) Oral at bedtime  heparin   Injectable 5000 Unit(s) SubCutaneous every 12 hours  losartan 12.5 milliGRAM(s) Oral daily  metoprolol succinate ER 25 milliGRAM(s) Oral daily  Nephro-tay 1 Tablet(s) Oral daily  pancrelipase  (CREON  6,000 Lipase Units) 1 Capsule(s) Oral three times a day with meals  polyethylene glycol 3350 17 Gram(s) Oral daily  senna 2 Tablet(s) Oral at bedtime  vitamin E 200 International Unit(s) Oral daily    PRN Inpatient Medications  dextrose Oral Gel 15 Gram(s) Oral once PRN  LORazepam   Injectable 1 milliGRAM(s) IV Push every 8 hours PRN  LORazepam   Injectable 1 milliGRAM(s) IV Push once PRN  oxyCODONE    IR 10 milliGRAM(s) Oral every 4 hours PRN      VITALS/PHYSICAL EXAM  --------------------------------------------------------------------------------  T(C): 36.3 (10-19-23 @ 12:42), Max: 37 (10-18-23 @ 19:30)  HR: 69 (10-19-23 @ 12:42) (68 - 89)  BP: 120/69 (10-19-23 @ 12:42) (117/59 - 171/84)  RR: 17 (10-19-23 @ 12:42) (17 - 18)  SpO2: 98% (10-19-23 @ 12:42) (98% - 100%)  Wt(kg): --        10-18-23 @ 07:01  -  10-19-23 @ 07:00  --------------------------------------------------------  IN: 400 mL / OUT: 800 mL / NET: -400 mL      Physical Exam:  	Gen: NAD  	HEENT: anicteric  	Pulm: CTA B/L  	CV: S1S2; no JVD  	Abd: soft abdomen  	: no suprapubic tenderness  	Extremities: dry   	Neuro: conversant  	Skin: dry   	Vascular access: DARYL WILLIS    LABS/STUDIES  --------------------------------------------------------------------------------              9.5    5.17  >-----------<  194      [10-18-23 @ 07:19]              29.9     140  |  103  |  59  ----------------------------<  126      [10-18-23 @ 07:19]  4.7   |  22  |  4.87        Ca     8.1     [10-18-23 @ 07:19]      Mg     1.90     [10-18-23 @ 07:19]      Phos  4.7     [10-18-23 @ 07:19]    TPro  5.8  /  Alb  3.2  /  TBili  0.3  /  DBili  x   /  AST  44  /  ALT  73  /  AlkPhos  233  [10-18-23 @ 07:19]          Creatinine Trend:  SCr 4.87 [10-18 @ 07:19]  SCr 3.73 [10-17 @ 05:10]  SCr 4.56 [10-16 @ 04:50]  SCr 4.04 [10-15 @ 11:04]  SCr 3.04 [10-14 @ 05:59]    Urinalysis - [10-18-23 @ 07:19]      Color  / Appearance  / SG  / pH       Gluc 126 / Ketone   / Bili  / Urobili        Blood  / Protein  / Leuk Est  / Nitrite       RBC  / WBC  / Hyaline  / Gran  / Sq Epi  / Non Sq Epi  / Bacteria         HIV Nonreact      [10-18-23 @ 07:19]    Syphilis Screen (Treponema Pallidum Ab) Negative      [10-16-23 @ 10:10]

## 2023-10-19 NOTE — PROGRESS NOTE ADULT - SUBJECTIVE AND OBJECTIVE BOX
INTERVAL HISTORY:  No overnight events, doing well      Medications: MEDICATIONS  (STANDING):  ARIPiprazole 2 milliGRAM(s) Oral daily  aspirin enteric coated 81 milliGRAM(s) Oral daily  atorvastatin 40 milliGRAM(s) Oral at bedtime  chlorhexidine 2% Cloths 1 Application(s) Topical daily  dextrose 5%. 1000 milliLiter(s) (50 mL/Hr) IV Continuous <Continuous>  dextrose 5%. 1000 milliLiter(s) (100 mL/Hr) IV Continuous <Continuous>  dextrose 50% Injectable 25 Gram(s) IV Push once  dextrose 50% Injectable 25 Gram(s) IV Push once  dextrose 50% Injectable 12.5 Gram(s) IV Push once  gabapentin 100 milliGRAM(s) Oral at bedtime  glucagon  Injectable 1 milliGRAM(s) IntraMuscular once  haloperidol     Tablet 1 milliGRAM(s) Oral at bedtime  heparin   Injectable 5000 Unit(s) SubCutaneous every 12 hours  losartan 12.5 milliGRAM(s) Oral daily  metoprolol succinate ER 25 milliGRAM(s) Oral daily  Nephro-tay 1 Tablet(s) Oral daily  pancrelipase  (CREON  6,000 Lipase Units) 1 Capsule(s) Oral three times a day with meals  polyethylene glycol 3350 17 Gram(s) Oral daily  senna 2 Tablet(s) Oral at bedtime  vitamin E 200 International Unit(s) Oral daily    MEDICATIONS  (PRN):  dextrose Oral Gel 15 Gram(s) Oral once PRN Blood Glucose LESS THAN 70 milliGRAM(s)/deciliter  LORazepam   Injectable 1 milliGRAM(s) IV Push every 8 hours PRN Agitation  LORazepam   Injectable 1 milliGRAM(s) IV Push once PRN Anxiety  oxyCODONE    IR 10 milliGRAM(s) Oral every 4 hours PRN Mod-severe pain       Vitals:  Vital Signs Last 24 Hrs  T(C): 36.4 (19 Oct 2023 14:57), Max: 36.8 (19 Oct 2023 05:00)  T(F): 97.5 (19 Oct 2023 14:57), Max: 98.2 (19 Oct 2023 05:00)  HR: 68 (19 Oct 2023 14:57) (68 - 69)  BP: 110/62 (19 Oct 2023 14:57) (110/62 - 171/84)  BP(mean): --  RR: 17 (19 Oct 2023 14:57) (17 - 18)  SpO2: 98% (19 Oct 2023 14:57) (98% - 100%)    Parameters below as of 19 Oct 2023 14:57  Patient On (Oxygen Delivery Method): room air                    General:  Constitutional: Male, appears stated age, in no apparent distress including pain  Head: Normocephalic & Atraumatic.  Respiratory: Breathing comfortably.    Neurological:  MS: Eyes open. Awake, alert, oriented to person, place, situation, time. Follows all commands. Pleasant, cooperative demeanor.     Language: Speech is clear, fluent with good repetition & comprehension.    CNs: VFF. EOMI no nystagmus. V1-3 intact to LT b/l. No facial asymmetry b/l, full eye closure strength b/l.     Motor: Normal muscle bulk & tone. No noticeable tremor. No drift of UE or LE b/l.    Sensation: Intact to LT b/l throughout.     Cortical: Extinction on DSS (neglect): none    Coordination: No dysmetria to FTN b/l.     Gait: Deferred.        LABS:                          9.5    5.17  )-----------( 194      ( 18 Oct 2023 07:19 )             29.9     10-18    140  |  103  |  59<H>  ----------------------------<  126<H>  4.7   |  22  |  4.87<H>    Ca    8.1<L>      18 Oct 2023 07:19  Phos  4.7     10-18  Mg     1.90     10-18    TPro  5.8<L>  /  Alb  3.2<L>  /  TBili  0.3  /  DBili  x   /  AST  44<H>  /  ALT  73<H>  /  AlkPhos  233<H>  10-18    LIVER FUNCTIONS - ( 18 Oct 2023 07:19 )  Alb: 3.2 g/dL / Pro: 5.8 g/dL / ALK PHOS: 233 U/L / ALT: 73 U/L / AST: 44 U/L / GGT: x             Urinalysis Basic - ( 18 Oct 2023 07:19 )    Color: x / Appearance: x / SG: x / pH: x  Gluc: 126 mg/dL / Ketone: x  / Bili: x / Urobili: x   Blood: x / Protein: x / Nitrite: x   Leuk Esterase: x / RBC: x / WBC x   Sq Epi: x / Non Sq Epi: x / Bacteria: x          STUDIES & IMAGING:  Studies (EKG, EEG, EMG, etc):     Radiology (XR, CT, MR, U/S, TTE/ISAIAS):    Head CT: A subacute-chronic appearing infarct is again seen within the   left frontal lobe.    Additional chronic lacunar infarcts are again seen within the bilateral   basal ganglia.    There is no acute intracranial hemorrhage, mass effect, shift of the   midline structures, herniation, hydrocephalus, or abnormal intra-axial   fluid collection.    The paranasal sinuses and tympanomastoid cavities are clear. The   calvarium appears intact. The orbits appear unremarkable.    CTA NECK: There is a standard anatomic configuration to the aortic arch.   The origins of the great vessels appear unremarkable.    The bilateral common carotid arteries, carotid bifurcations, and cervical   internal carotid arteries appear grossly unremarkable.    The origins of the bilateral vertebral arteries are normal. The vertebral   arteries are patent throughout the course in the neck.    Numerous collateral venous channels are seen which enhance with contrast   around the spine.    A patchy opacity in the right upper lobe is seen.    CTA HEAD: Mild calcified plaques affect the bilateral carotid siphons   without associated stenosis. Otherwise, the bilateral intracranial   internal carotid, anterior, and middle cerebral arteries appear   unremarkable.    The anterior and bilateral posterior communicating arteries are seen.    The posterior circulation appears patent.    The intracranial venous structures are patent.    IMPRESSION:    Head CT: Stable follow-up CT exam.    CTA neck and head: No large vessel occlusion or major stenosis.    Patchy right upper lobe opacity for which pneumonia is a possibility.   Recommend imaging follow-up to resolution.    --- End of Report ---      [facility] 10/6:    Multiple chronic bilateral basal ganglia: Infarctions on image   26 of series 2. Small subacute/chronic left frontal lobe cortical infarct   on image 43 of series 2  There is periventricular and subcortical white matter hypodensity without   mass effect, nonspecific, likely representing mild chronic microvascular   ischemic changes. There is no other compelling evidence for an acute   transcortical infarction. There is no evidence of mass, mass effect,   midline shift or extra-axial fluid collection. The lateral ventricles and   cortical sulci are age-appropriate in size and configuration. The orbits,   mastoid air cells and visualized paranasal sinuses are unremarkable. The   calvarium is intact. Consider MRI as clinically warranted.    IMPRESSION: Subacute/chronic left frontal lobe cortical infarct. Consider   MRI for further evaluation.    --- End of Report ---    < from: TTE W or WO Ultrasound Enhancing Agent (10.11.23 @ 16:31) >   1. Left ventricular cavity is normally sized. Left ventricular wall thickness is normal. Left ventricular systolic function is severely decreased with an ejection fraction visually estimated at 20 to 25 %. Global left ventricular hypokinesis.   2. There is no evidence of a left ventricular thrombus.   3. There is mild (grade 1) left ventricular diastolic dysfunction.   4. Normal right ventricular cavity size and systolic function.   5. There is calcification of the mitral valve annulus. There is moderate mitral regurgitation.   6. The aortic valve appears trileaflet with normal systolic excursion. There is calcification of the aortic valve leaflets. There is no aortic valve stenosis. There is moderate aortic regurgitation.   7. Agitated saline injection was negative for intracardiac shunt.   8. No prior echocardiogram is available for comparison.    < end of copied text >

## 2023-10-19 NOTE — DISCHARGE NOTE NURSING/CASE MANAGEMENT/SOCIAL WORK - PATIENT PORTAL LINK FT
You can access the FollowMyHealth Patient Portal offered by [facility] by registering at the following website: [email or web address]. By joining [facility]’s FollowMyHealth portal, you will also be able to view your health information using other applications (apps) compatible with our system.

## 2023-10-19 NOTE — DISCHARGE NOTE NURSING/CASE MANAGEMENT/SOCIAL WORK - NSDCVIVACCINE_GEN_ALL_CORE_FT
Tdap; 30-Aug-2022 22:32; Rusty Stratton (RN); [employer]; [id number] (Exp. Date: 29-Nov-2024); IntraMuscular; Deltoid Left.; 0.5 milliLiter(s); VIS (VIS Published: 09-May-2013, VIS Presented: 30-Aug-2022);

## 2023-10-19 NOTE — PROGRESS NOTE ADULT - PROVIDER SPECIALTY LIST ADULT
Internal Medicine
Nephrology
Internal Medicine
Nephrology
Internal Medicine
Nephrology
Nephrology
Neurology
Nephrology
Internal Medicine
Hospitalist
Internal Medicine
Hospitalist
Internal Medicine

## 2023-10-19 NOTE — DISCHARGE NOTE NURSING/CASE MANAGEMENT/SOCIAL WORK - NSDCFUADDAPPT_GEN_ALL_CORE_FT
APPTS ARE READY TO BE MADE: [X] YES    Best Family or Patient Contact (if needed):  [phone]    Additional Information about above appointments (if needed):    1: Please follow up with your cardiologist (Dr. Juárez) on 10/23/23 when you are discharged from the hospital to make sure you are on the correct medications and doses for your heart failure.  2: Please follow up with your primary care doctor (Dr. Odom) within 1 week of being discharged from the hospital to adjust your pain medication regimen as needed.   3: Please schedule a follow-up appointment with neurology (Dr. Saenz) within 1 week of being discharged from the hospital.     Other comments or requests:     Patient was scheduled for an appointment on  10/19/23 at 1:30PM at [address] [facility], [place], [place], [place] with Neurologist, Dr Ren Saenz. Patient/Caregiver was advised of appointment details.

## 2023-10-19 NOTE — PROGRESS NOTE ADULT - ASSESSMENT
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 Neurology consulted for head CT findings showing subacute/chronic infarct in frontal lobe. rCTH unchanged. CTA H/N w/o LVO or significant stenosis.   LDL 28  TTE with EF 20-25%    Impression: Patient presented with generalized weakness/pain. Undergoing psychiatric workup for acutely worsening psychosis. Incidentally found to have clinically silent, likely chronic L MCA infarct from embolic stroke of undetermined source, possibly cardioembolic from low EF    Recommendations    [] Cardiology eval for HF, low EF  [] On ASA   [] Atorvastatin 40 mg   [] DVT prophyaxis   [] Lipid Panel, HgA1C  [] Neurochecks qshift  [] appreciate psych recs -> pending transfer to 7s  [] BP goals: Normotension  [] HgA1C goals < 7.0  [] Lifestyle modifications: smoking cessation, exercise, and a Mediterranean style diet.   [] Upon discharge, patient should follow up with Dr. Josue  [address] [place] [address]. Call [phone].     Christin Josue DO  Vascular Neurology  Office [phone] .     Please call with questions: [phone]

## 2023-10-19 NOTE — DISCHARGE NOTE NURSING/CASE MANAGEMENT/SOCIAL WORK - NSDCCRNAME_GEN_ALL_CORE_FT
[facility] Address: [address] [place] [address]

## 2023-10-19 NOTE — PROGRESS NOTE ADULT - REASON FOR ADMISSION
chronic pancreatitis pain

## 2023-10-19 NOTE — PROGRESS NOTE ADULT - ASSESSMENT
63 year old man ESKD on HD, admitted with psychosis, found also to have a subacute/chronic infarct     ESRD- BP was running low on last dialysis session 10/18 and UF goal was lowered.   considering subacute/chronic infarct, would like to avoid hypotension.   reduce UF to 1 L ( No edema/ lungs clear)  losartan dose reduced to 12.5 mg daily and give metoprolol and losartan after dialysis on dialysis days.      Hb- 9.5. Monitor. Like to keep off EMMANUEL for now considering stroke.   next dialysis 10/20    sudha tanner  nephrology attending   please contact me on TEAMS   Office- [phone]
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
## 2023-11-07 PROBLEM — G89.4 CHRONIC PAIN DISORDER: Status: ACTIVE | Noted: 2023-01-01

## 2023-11-07 PROBLEM — M79.18 MYOFASCIAL PAIN: Status: ACTIVE | Noted: 2023-01-01

## 2023-11-07 PROBLEM — M54.16 LUMBAR RADICULOPATHY, CHRONIC: Status: ACTIVE | Noted: 2023-01-01

## 2023-11-07 PROBLEM — M79.2 NEUROPATHIC PAIN: Status: ACTIVE | Noted: 2023-01-01

## 2023-12-01 ENCOUNTER — APPOINTMENT (OUTPATIENT)
Dept: INTERNAL MEDICINE | Facility: CLINIC | Age: 63
End: 2023-12-01

## 2024-01-12 NOTE — PROGRESS NOTE ADULT - PROBLEM SELECTOR PLAN 2
-- DO NOT REPLY / DO NOT REPLY ALL --  -- Message is from Engagement Center Operations (ECO) --    General Patient Message: Patient is currently in [place] and ran out of medications, she arrives back home on Monday. She is requesting that  send a few days of the following medications to the [facility] located at [address] [place] [address] ([phone])    Bupropion 150MG  Bupropion 300MG  Celebrex 200MG  Desvenlafaxine 100MG  Rosuvastatin 10MG  Rabeprazole 20MG        Caller Information         Type Contact Phone/Fax    01/12/2024 08:11 AM CST Phone (Incoming) Sanjuana Spear (Self) [phone] (M)          Alternative phone number: n/a    Can a detailed message be left? Yes    Message Turnaround:                b/l heel wounds and right 2nd and 3rd toe wound.   - Xray foot negative for signs of OM  - podiatry following- concern for poor wound healing in the setting PAD - rec local wound care until revascularization   - BARRY/PVR result reviewed; mild stenosis in LLE, moderate stenosis in RLE.   - S/p RLE angio by vascular on 1/20 with angioplasty 2/3  - d/w podiatry, no need for MR at this time given lower suspicion for OM   - Appreciate vascular and podiatry recs

## 2024-01-23 ENCOUNTER — APPOINTMENT (OUTPATIENT)
Dept: GASTROENTEROLOGY | Facility: CLINIC | Age: 64
End: 2024-01-23

## 2024-01-24 ENCOUNTER — APPOINTMENT (OUTPATIENT)
Dept: INTERNAL MEDICINE | Facility: CLINIC | Age: 64
End: 2024-01-24

## 2024-02-04 NOTE — DISCHARGE NOTE PROVIDER - CARE PROVIDERS DIRECT ADDRESSES
[email or web address] Arm band on/IV intact/Admission wristband placed

## 2024-02-13 ENCOUNTER — APPOINTMENT (OUTPATIENT)
Dept: NEUROLOGY | Facility: CLINIC | Age: 64
End: 2024-02-13

## 2024-03-12 NOTE — DIETITIAN NUTRITION RISK NOTIFICATION - PHYSICAL ASSESSMENT FAT OVERLYING RIBCAGE
[FreeTextEntry1] : Mr. ANN is a 58 year-year-old White M with mild mental retardation, cardiomyopathy, HLD, HTN, PVCs s/p ablation, Vitamin D Def current smoker of cigars (few a day).  Presenting with BPH symptoms.  Patient endorsing remarkable improvement with tamsulosin.  Pleased with results.  Wishes to continue. Uroflow low volume, PVR 89 cc. We will follow-up in 6 months with uroflow PVR IPSS.  We discussed the effects that BPH can cause on the bladder: BPH can cause detrusor overactivity which results in urgency and frequency and in some cases, incontinence. Over a longer time,some men may develop large volume/acontractile bladders, while other men develop small or normal volume bladders with loss of compliance. Neither is ideal and both represent end stage bladder damage that can not be fixed and can cause kidney injury. I explained the 3 main jobs of the bladder which are to 1) store urine, 2) evacuate urine and 3) keep urine at low pressure to prevent upper tract injury. I explained the mechanisms of urinary tract infections, hydronephrosis and kidney injury due to urinary retention.  These consequences can be severe, irreversible and even life threatening.   We then discussed the treatment options for BPH. We generally start with medical management and proceed to surgery if necessary. Alpha blockers act to relax the prostate while 5-alpha reductase inhibitors (5-Ravin) shrink the prostate. 5-BLAKE's are most effective in patients who have prostates that are >40gm. The former are fairly fast acting (in a matter of days-weeks) while the latter can take up to 3-6 months to take effect.  
severe

## 2024-03-23 NOTE — PROGRESS NOTE ADULT - SUBJECTIVE AND OBJECTIVE BOX
no Batool James MD   [facility] Medicine  Pager [phone]      PROGRESS NOTE:     Patient is a 61y old  Male who presents with a chief complaint of Esophageal stricture (14 Apr 2022 11:59)      SUBJECTIVE / OVERNIGHT EVENTS:    No acute events overnight. Patient examined at bedside with no acute complaints.     Denies SOB, chest pain, nausea, vomiting, diarrhea, constipation.    MEDICATIONS  (STANDING):  bisacodyl Suppository 10 milliGRAM(s) Rectal daily  folic acid 1 milliGRAM(s) Oral daily  gabapentin 200 milliGRAM(s) Oral at bedtime  heparin   Injectable 5000 Unit(s) SubCutaneous every 8 hours  metoclopramide 10 milliGRAM(s) Oral four times a day  multivitamin 1 Tablet(s) Oral daily  pancrelipase  (CREON  6,000 Lipase Units) 2 Capsule(s) Oral three times a day with meals  pantoprazole  Injectable 40 milliGRAM(s) IV Push two times a day  polyethylene glycol 3350 17 Gram(s) Oral daily  senna 2 Tablet(s) Oral at bedtime  sucralfate 1 Gram(s) Oral every 6 hours  tamsulosin 0.4 milliGRAM(s) Oral at bedtime  thiamine 100 milliGRAM(s) Oral daily    MEDICATIONS  (PRN):  HYDROmorphone  Injectable 0.5 milliGRAM(s) IV Push every 6 hours PRN Severe Pain (7 - 10)  HYDROmorphone  Injectable 0.25 milliGRAM(s) IV Push every 6 hours PRN Moderate Pain (4 - 6)  melatonin 3 milliGRAM(s) Oral at bedtime PRN Insomnia      CAPILLARY BLOOD GLUCOSE        I&O's Summary      PHYSICAL EXAM:  Vital Signs Last 24 Hrs  T(C): 36.5 (15 Apr 2022 06:40), Max: 37 (14 Apr 2022 12:50)  T(F): 97.7 (15 Apr 2022 06:40), Max: 98.6 (14 Apr 2022 12:50)  HR: 59 (15 Apr 2022 06:40) (59 - 71)  BP: 115/51 (15 Apr 2022 06:40) (96/62 - 127/76)  BP(mean): --  RR: 16 (15 Apr 2022 06:40) (16 - 20)  SpO2: 100% (15 Apr 2022 06:40) (99% - 100%)    GENERAL: Frail thin man lying in bed  HEAD:  Atraumatic, Normocephalic  EYES: EOMI, PERRLA, conjunctiva and sclera clear  ENT: Moist mucous membranes  NECK: Supple, No JVD  CHEST/LUNG: Clear to auscultation bilaterally; No rales, rhonchi, wheezing, or rubs. Unlabored respirations  HEART: Regular rate and rhythm; No murmurs, rubs, or gallops  ABDOMEN: BSx4; Soft, tender to palpation in epigastrium, no guarding, hiccuping  EXTREMITIES:  1+ Peripheral Pulses, brisk capillary refill. long toenails  NERVOUS SYSTEM:  A&Ox3, no focal deficits   SKIN: No rashes or lesions    LABS:                        9.6    4.63  )-----------( 186      ( 15 Apr 2022 07:43 )             28.9     04-14    137  |  108<H>  |  27<H>  ----------------------------<  174<H>  3.8   |  18<L>  |  2.62<H>    Ca    8.2<L>      14 Apr 2022 07:20  Phos  3.7     04-14  Mg     1.90     04-14    TPro  4.8<L>  /  Alb  2.5<L>  /  TBili  <0.2  /  DBili  x   /  AST  14  /  ALT  10  /  AlkPhos  84  04-14    PT/INR - ( 14 Apr 2022 07:20 )   PT: 12.5 sec;   INR: 1.08 ratio         PTT - ( 14 Apr 2022 07:20 )  PTT:29.3 sec          Culture - Blood (collected 14 Apr 2022 01:56)  Source: .Blood Blood  Preliminary Report (15 Apr 2022 02:01):    No growth to date.    Culture - Blood (collected 14 Apr 2022 01:56)  Source: .Blood Blood  Preliminary Report (15 Apr 2022 02:01):    No growth to date.        RADIOLOGY & ADDITIONAL TESTS:  Results Reviewed:   Imaging Personally Reviewed:  Electrocardiogram Personally Reviewed:    COORDINATION OF CARE:  Care Discussed with Consultants/Other Providers [Y/N]:  Prior or Outpatient Records Reviewed [Y/N]:   Batool James MD   [facility] Medicine  Pager [phone]      PROGRESS NOTE:     Patient is a 61y old  Male who presents with a chief complaint of Esophageal stricture (14 Apr 2022 11:59)      SUBJECTIVE / OVERNIGHT EVENTS:    No acute events overnight. Patient examined at bedside and continues to report 10/10 chest pain related to his esophagitis/esophageal stricture. Reports that dilaudid is not helpful and specifically requesting morphine.    Denies SOB, nausea, vomiting, diarrhea, constipation.    MEDICATIONS  (STANDING):  bisacodyl Suppository 10 milliGRAM(s) Rectal daily  folic acid 1 milliGRAM(s) Oral daily  gabapentin 200 milliGRAM(s) Oral at bedtime  heparin   Injectable 5000 Unit(s) SubCutaneous every 8 hours  metoclopramide 10 milliGRAM(s) Oral four times a day  multivitamin 1 Tablet(s) Oral daily  pancrelipase  (CREON  6,000 Lipase Units) 2 Capsule(s) Oral three times a day with meals  pantoprazole  Injectable 40 milliGRAM(s) IV Push two times a day  polyethylene glycol 3350 17 Gram(s) Oral daily  senna 2 Tablet(s) Oral at bedtime  sucralfate 1 Gram(s) Oral every 6 hours  tamsulosin 0.4 milliGRAM(s) Oral at bedtime  thiamine 100 milliGRAM(s) Oral daily    MEDICATIONS  (PRN):  HYDROmorphone  Injectable 0.5 milliGRAM(s) IV Push every 6 hours PRN Severe Pain (7 - 10)  HYDROmorphone  Injectable 0.25 milliGRAM(s) IV Push every 6 hours PRN Moderate Pain (4 - 6)  melatonin 3 milliGRAM(s) Oral at bedtime PRN Insomnia      CAPILLARY BLOOD GLUCOSE        I&O's Summary      PHYSICAL EXAM:  Vital Signs Last 24 Hrs  T(C): 36.5 (15 Apr 2022 06:40), Max: 37 (14 Apr 2022 12:50)  T(F): 97.7 (15 Apr 2022 06:40), Max: 98.6 (14 Apr 2022 12:50)  HR: 59 (15 Apr 2022 06:40) (59 - 71)  BP: 115/51 (15 Apr 2022 06:40) (96/62 - 127/76)  BP(mean): --  RR: 16 (15 Apr 2022 06:40) (16 - 20)  SpO2: 100% (15 Apr 2022 06:40) (99% - 100%)    GENERAL: Frail thin man lying in bed  HEAD:  Atraumatic, Normocephalic  EYES: EOMI, PERRLA, conjunctiva and sclera clear  ENT: Moist mucous membranes  NECK: Supple, No JVD  CHEST/LUNG: Clear to auscultation bilaterally; No rales, rhonchi, wheezing, or rubs. Unlabored respirations  HEART: Regular rate and rhythm; No murmurs, rubs, or gallops  ABDOMEN: BSx4; Soft, tender to palpation in epigastrium, no guarding, hiccuping  EXTREMITIES:  1+ Peripheral Pulses, brisk capillary refill. long toenails  NERVOUS SYSTEM:  A&Ox3, no focal deficits   SKIN: No rashes or lesions    LABS:                        9.6    4.63  )-----------( 186      ( 15 Apr 2022 07:43 )             28.9     04-14    137  |  108<H>  |  27<H>  ----------------------------<  174<H>  3.8   |  18<L>  |  2.62<H>    Ca    8.2<L>      14 Apr 2022 07:20  Phos  3.7     04-14  Mg     1.90     04-14    TPro  4.8<L>  /  Alb  2.5<L>  /  TBili  <0.2  /  DBili  x   /  AST  14  /  ALT  10  /  AlkPhos  84  04-14    PT/INR - ( 14 Apr 2022 07:20 )   PT: 12.5 sec;   INR: 1.08 ratio         PTT - ( 14 Apr 2022 07:20 )  PTT:29.3 sec          Culture - Blood (collected 14 Apr 2022 01:56)  Source: .Blood Blood  Preliminary Report (15 Apr 2022 02:01):    No growth to date.    Culture - Blood (collected 14 Apr 2022 01:56)  Source: .Blood Blood  Preliminary Report (15 Apr 2022 02:01):    No growth to date.        RADIOLOGY & ADDITIONAL TESTS:  Results Reviewed:   Imaging Personally Reviewed:  Electrocardiogram Personally Reviewed:    COORDINATION OF CARE:  Care Discussed with Consultants/Other Providers [Y/N]:  Prior or Outpatient Records Reviewed [Y/N]:

## 2024-06-04 NOTE — PROGRESS NOTE ADULT - PROBLEM SELECTOR PLAN 4
----- Message from Brittany Aguilar RN sent at 6/4/2024  9:39 AM CDT -----    ----- Message -----  From: Armando Padilla MD  Sent: 6/4/2024   9:30 AM CDT  To: Brittany Aguilar RN    Please advise patient that the lipid profile reveals lipids not to be at goal.  He has not been able to tolerate atorvastatin or rosuvastatin due to lifestyle limiting myalgias.  Please ask him if you would be willing to try low-dose rosuvastatin 5 mg to be taken Monday Wednesday and Friday.    Called patient to convey above. Patient did not answer, M requesting call back to [phone] to discuss results.      S/p HD X 3  in ICU in [facility], Cr plateaued at 5.7-6, off HD now; per renal plan for AVF creation on this admission as per d/w OP nephrologist baseline Cr is 3-CKD stage 4-5 and nearing ESRD  - [facility] renal duplex with severe stenosis of the left renal artery; vascular consulted, no plan for intervention for HESHAM  - sevelamer 800 mg TID, bicarb 1300 mg TID; calcitriol increased to 0.5 for secondary hyperparathyroidism per renal  - s/p AVF w/ vascular on 1/5 to the L arm, L arm precautions  - avoid nephrotoxic  - renally dose meds  - trend Cr and electrolytes closely  - discussed with nephrology patient is stable for d/c to [facility] from nephrology perspective, will d/c Bicitra as patient is not tolerating it  [ ] Patient intermittently refusing sodium bicarb PO, one access obtained, can start IV bicarb infusion to prepare for OR   - Per Renal fistula examined with faint bruit, will have vascular f/up on patient

## 2024-07-14 NOTE — DISCHARGE NOTE PROVIDER - NSDCCPGOAL_GEN_ALL_CORE_FT
[facility] Internal Medicine Progress Note      Subjective     No BM yet. Had worsening nausea and associated light-headedness earlier, both have improved with zofran.    Objective     I/O's    Intake/Output Summary (Last 24 hours) at 7/14/2024 1701  Last data filed at 7/14/2024 0900  Gross per 24 hour   Intake 420 ml   Output --   Net 420 ml       Last Recorded Vitals  Blood pressure (!) 138/94, pulse 87, temperature 97.9 °F (36.6 °C), temperature source Tympanic, resp. rate 18, height 5' 8\" (1.727 m), weight 72 kg (158 lb 11.7 oz), SpO2 98%.  Body mass index is 24.14 kg/m².    Physical Exam  Constitutional:       General: He is not in acute distress.  HENT:      Mouth/Throat:      Mouth: Mucous membranes are moist.   Eyes:      General: No scleral icterus.     Extraocular Movements: Extraocular movements intact.   Cardiovascular:      Rate and Rhythm: Normal rate and regular rhythm.   Pulmonary:      Effort: No respiratory distress.      Breath sounds: Normal breath sounds.   Abdominal:      Palpations: Abdomen is soft.      Comments: Mild distention, slightly worse from yesterday. Still with tenderness.   Musculoskeletal:         General: No swelling or deformity.   Skin:     Comments: Varicose veins  noted   Neurological:      General: No focal deficit present.      Mental Status: He is alert and oriented to person, place, and time.         Current Facility-Administered Medications   Medication    hydrALAZINE (APRESOLINE) injection 10 mg    ondansetron (ZOFRAN) injection 4 mg    lactated ringers infusion    lisinopril (ZESTRIL) tablet 10 mg    acetaminophen (TYLENOL) tablet 1,000 mg    gabapentin (NEURONTIN) capsule 300 mg    oxyCODONE (IMM REL) (ROXICODONE) tablet 5 mg    morphine injection 2 mg    famotidine (PEPCID) tablet 20 mg    enoxaparin (LOVENOX) injection 40 mg    tamsulosin (FLOMAX) capsule 0.4 mg        Medications Prior to Admission   Medication Sig Dispense Refill    Cholecalciferol (vitamin  D) 50 mcg (2,000 units) capsule Take 100 mcg by mouth daily.      [DISCONTINUED] neomycin (MYCIFRADIN) 500 MG tablet Take 500 mg by mouth. Take 1 dose at 7:00 PM and 1 dose at 9:00 PM the day before your procedure      [DISCONTINUED] metroNIDAZOLE (FLAGYL) 500 MG tablet Take 500 mg by mouth in the morning and 500 mg in the evening.      [DISCONTINUED] clindamycin (CLEOCIN) 150 MG capsule Take 150 mg by mouth 4 times daily.      lisinopril (ZESTRIL) 10 MG tablet TAKE 1 TABLET BY MOUTH EVERY DAY FOR BLOOD PRESSURE 90 tablet 0    ibuprofen (MOTRIN) 400 MG tablet Take 400 mg by mouth every 6 hours as needed.      [DISCONTINUED] GaviLyte-G 236 g solution [None received]      Multiple Vitamins-Minerals (vitamin - therapeutic multivitamins w/minerals) tablet Take 1 tablet by mouth daily.      tamsulosin (FLOMAX) 0.4 MG Cap Take 1 capsule by mouth daily after a meal. 30 capsule 11        Labs     Recent Labs   Lab 07/13/24  0450 07/12/24  0411   WBC 6.7 6.3   RBC 3.81* 3.06*   HGB 11.9* 9.5*   HCT 35.5* 28.8*   MCV 93.2 94.1    143     Recent Labs   Lab 07/13/24  0450 07/12/24  0411   SODIUM 143 143   POTASSIUM 3.7 4.2   CHLORIDE 109 110   CO2 29 24   BUN 6 7   CREATININE 0.86 0.69   GLUCOSE 105* 114*   CALCIUM 9.0 8.7   MG 2.2 2.0   PHOS 2.5  --      No results found    Imaging    No orders to display         Assessment/Plan  Metastatic colon cancer    S/p LAPAROSCOPIC RIGHT COLECTOMY, CENTRAL VASCULAR LIGATION AND COMPLETE MESOCOLIC EXCISION, RESECTION OF MESENTERIC MASS on 07/11  Anemia   HTN  BPH    Post op management per surgical services including pain control, diet management, dvt prophylaxis  Continue home lisinopril, flomax  Blood pressures are likely exacerbated by symptoms of nausea and abdominal pain  Can use prn hydralazine for now  Labs reviewed and are appropriate    D/w RN    Primary Care Physician  Trevor Hightower DO Joanne C Volden, MD  7/14/2024 5:01 PM           To get better and follow your care plan as instructed.

## 2024-07-21 NOTE — BH CONSULTATION LIAISON PROGRESS NOTE - NSBHATTESTBILLING_PSY_A_CORE
Subjective     He feels \"so so, not too good\". He still has a headache, pain in neck. He feels the pain radiate dowin to the middle.   Seems relieved w pain medicine  He denies subjective fever.   He feels the pain has gotten a \"little bit better\" since the shunt removal, \"not a lot, but a little bit\".   No n/v.   Last bm - 7/18.  Passing flatus.   Voiding well.   No cp or dyspnea     Objective     I/O's    Intake/Output Summary (Last 24 hours) at 7/21/2024 0905  Last data filed at 7/21/2024 0700  Gross per 24 hour   Intake 700 ml   Output 725 ml   Net -25 ml       Last Recorded Vitals  Blood pressure 94/58, pulse 72, temperature 97.7 °F (36.5 °C), temperature source Oral, resp. rate 20, height 5' 4.25\" (1.632 m), weight 74.6 kg (164 lb 7.4 oz), SpO2 95%.  Body mass index is 28.01 kg/m².    Physical Exam  Constitutional: awake, alert, no acute distress  CV: RRR, no m/r/g, No peripheral edema  Respiratory: CTAB, no increased work of breathing  GI: abdomen is soft, nt, nd. normal bowel sounds.   Neurologic: no obvious focal deficits, alert and oriented x 3       Diagnostic data:  Recent Results (from the past 24 hour(s))   Creatinine, Urine    Collection Time: 07/20/24  9:39 AM   Result Value Ref Range    Creatinine, Urine 118.17 mg/dL   Sodium, Urine    Collection Time: 07/20/24  9:39 AM   Result Value Ref Range    Sodium, Urine 105 mmol/L   Osmolality, Urine    Collection Time: 07/20/24  9:39 AM   Result Value Ref Range    Osmolality, Urine 747 50 - 1,200 mOsm/kg   Basic Metabolic Panel    Collection Time: 07/20/24  1:57 PM   Result Value Ref Range    Fasting Status      Sodium 132 (L) 135 - 145 mmol/L    Potassium 3.9 3.4 - 5.1 mmol/L    Chloride 99 97 - 110 mmol/L    Carbon Dioxide 24 21 - 32 mmol/L    Anion Gap 13 7 - 19 mmol/L    Glucose 101 (H) 70 - 99 mg/dL    BUN 7 6 - 20 mg/dL    Creatinine 0.62 (L) 0.67 - 1.17 mg/dL    Glomerular Filtration Rate >90 >=60    BUN/Cr 11 7 - 25    Calcium 8.6 8.4 - 10.2 mg/dL 
  Thyroid Stimulating Hormone    Collection Time: 07/20/24  1:57 PM   Result Value Ref Range    TSH 0.558 0.350 - 5.000 mcUnits/mL   Phosphorus    Collection Time: 07/21/24  5:56 AM   Result Value Ref Range    Phosphorus 2.6 2.4 - 4.7 mg/dL   Magnesium    Collection Time: 07/21/24  5:56 AM   Result Value Ref Range    Magnesium 1.8 1.7 - 2.4 mg/dL   Basic Metabolic Panel    Collection Time: 07/21/24  5:56 AM   Result Value Ref Range    Fasting Status      Sodium 130 (L) 135 - 145 mmol/L    Potassium 3.9 3.4 - 5.1 mmol/L    Chloride 98 97 - 110 mmol/L    Carbon Dioxide 21 21 - 32 mmol/L    Anion Gap 15 7 - 19 mmol/L    Glucose 102 (H) 70 - 99 mg/dL    BUN 6 6 - 20 mg/dL    Creatinine 0.59 (L) 0.67 - 1.17 mg/dL    Glomerular Filtration Rate >90 >=60    BUN/Cr 10 7 - 25    Calcium 8.5 8.4 - 10.2 mg/dL   CBC No Differential    Collection Time: 07/21/24  5:56 AM   Result Value Ref Range    WBC 10.2 4.2 - 11.0 K/mcL    RBC 3.50 (L) 4.50 - 5.90 mil/mcL    HGB 10.2 (L) 13.0 - 17.0 g/dL    HCT 30.5 (L) 39.0 - 51.0 %    MCV 87.1 78.0 - 100.0 fl    MCH 29.1 26.0 - 34.0 pg    MCHC 33.4 32.0 - 36.5 g/dL     140 - 450 K/mcL    RDW-CV 13.6 11.0 - 15.0 %    RDW-SD 42.9 39.0 - 50.0 fL    NRBC 0 <=0 /100 WBC       ?  Studies:  No results found.    Cardiac studies:   Encounter Date: 05/21/24   ECG   Result Value    Ventricular Rate EKG/Min (BPM) 54    Atrial Rate (BPM) 54    NM-Interval (MSEC) 150    QRS-Interval (MSEC) 76    QT-Interval (MSEC) 422    QTc 400    P Axis (Degrees) 56    R Axis (Degrees) 12    T Axis (Degrees) 49    REPORT TEXT      Sinus bradycardia  Otherwise normal ECG  When compared with ECG of  20-SEP-2023 23:50,  No significant change was found  Confirmed by EDI MONTANO MD ([id number]) on 5/22/2024 3:46:50 PM         Inpatient Medications:  Current Facility-Administered Medications   Medication Dose Route Frequency Provider Last Rate Last Admin    enoxaparin (LOVENOX) injection 40 mg  40 mg Subcutaneous Q24H 
Michele Colon MD   40 mg at 07/20/24 1311    nortriptyline (PAMELOR) capsule 25 mg  25 mg Oral Nightly Seal, Rolan   25 mg at 07/20/24 2021    Magnesium Standard Replacement Protocol   Does not apply See Admin Instructions Seal, Rolan        Potassium Standard Replacement Protocol (Levels 3.5 and lower)   Does not apply See Admin Instructions Seal, Rolan        Potassium Replacement (Levels 3.6 - 4)   Does not apply See Admin Instructions Seal Rolan        ceFEPIme (MAXIPIME) 2,000 mg in sodium chloride 0.9 % 100 mL IVPB  2,000 mg Intravenous 3 times per day Angella Zacarias DO 25 mL/hr at 07/21/24 0534 2,000 mg at 07/21/24 0534    acetaminophen (TYLENOL) tablet 1,000 mg  1,000 mg Oral Q6H Gerardo Palacio MD   1,000 mg at 07/20/24 2330    sodium chloride 0.9 % injection 2 mL  2 mL Intracatheter 2 times per day Seal, Rolan   2 mL at 07/20/24 2121    Magnesium Standard Replacement Protocol   Does not apply See Admin Instructions Seal, Rolan        Phosphorus Standard Replacement Protocol   Does not apply See Admin Instructions Seal, Rolan        atorvastatin (LIPITOR) tablet 20 mg  20 mg Oral Q Evening Seal, Rolan   20 mg at 07/20/24 1806    buPROPion XL (WELLBUTRIN XL) tablet 150 mg  150 mg Oral Daily Seal, Rolan   150 mg at 07/20/24 0859    FLUoxetine (PROzac) capsule 40 mg  40 mg Oral Daily Seal, Rolan   40 mg at 07/20/24 0859    lisinopril (ZESTRIL) tablet 10 mg  10 mg Oral Daily Seal, Rolan   10 mg at 07/20/24 0900    topiramate (TOPAMAX) tablet 25 mg  25 mg Oral Daily Seal, Rolan   25 mg at 07/20/24 0859    famotidine (PEPCID) tablet 20 mg  20 mg Oral Daily Gerardo Palacio MD   20 mg at 07/20/24 0859      Current Facility-Administered Medications   Medication Dose Route Frequency Provider Last Rate Last Admin    HYDROmorphone (DILAUDID) injection 0.5 mg  0.5 mg Intravenous Q2H PRN Seal, Rolan   0.5 mg at 07/21/24 0131    sodium chloride 0.9 % flush bag 25 mL  25 mL 
Intravenous PRN Seal, Rolan        sodium chloride 0.9% infusion   Intravenous Continuous PRN Seal, Rolan        sodium chloride 0.9% infusion   Intravenous Continuous PRN Seal, Rolan        sodium chloride (NORMAL SALINE) 0.9 % bolus 500 mL  500 mL Intravenous PRN Seal, Rolan        ondansetron (ZOFRAN ODT) disintegrating tablet 4 mg  4 mg Oral Q12H PRN Seal, Rolan        Or    ondansetron (ZOFRAN) injection 4 mg  4 mg Intravenous Q12H PRN Seal, Rolan        butalbital-APAP-caffeine (FIORICET) -40 MG tablet 1 tablet  1 tablet Oral Q4H PRN Seal, Rolan   1 tablet at 07/20/24 2021    traMADol (ULTRAM) tablet 50 mg  50 mg Oral Q6H PRN Seal, Rolan   50 mg at 07/21/24 0013    hydrALAZINE (APRESOLINE) injection 10 mg  10 mg Intravenous Q4H PRN Seal, Rolan        labetalol (NORMODYNE) injection 20 mg  20 mg Intravenous Q4H PRN Seal, Rolan              Assessment & Plan     Hydrocephalus s/p  shunt revision in 2021 and s/p  shunt removal on 7/18/2024     shunt infection   -s/p  shunt removal 7/18/24 for suspected  shunt infection   -CSF cultures NGTD, surgical cultures with Ctirobacter koseri (same strain as prior). GS: no organisms and +PMLs   -CT head on 7/20/2024 stable   -EVD removed at bedside on 7/20 and patient transferred from SICU to floor   -neuro checks, pain control prn   -now on IV cefepime   -ID following   -neurosurgery following   -on nortriptyline 25mg po at bedtime, perhaps for headache. He has no relief from tramadol, so will switch to norco in order to get him off of iv dilaudid.   -he had also been prescribed topiramate 25mg po qday by Dr. Hassan on 7/10, but had not yet started it.        Cervical intervertebral disc displacement and myelopathy s/p discectomy and fusion 6/24/2024   -pain control prn   -physical therapy, occupational therapy        Hyponatremia  -Na 132-> 130.   -urine lytes, osm ordered  -increase fluid restriction, by lowering it to 1200ml/ day 
  -trend Na       Hypertension   -bp here is 94/58, patient home med list says lisinopril 10mg daily, but patient states he stopped this a few months ago when it always low/normal at pcp office. He was told to stop lisinopril by [facility] pcp. We will discontinue lisinopril here.   -prn hydralazine, labetalol        Hyperlipidemia  -continue atorvastatin 20mg po at bedtime        Depression   -he states no longer on fluoxetine, only takes wellbutrin XL 159g daily         Dvt prophylaxis  -lovenox     PCP: Claudio Salguero MD          Future Appointments   Date Time Provider Department Center   8/6/2024  9:00 AM Virgil Hassan DO [id number]                   
49728-Rzlcjajkan OBS or IP - low complexity OR 25-34 mins
47257-Uewabnorlx OBS or IP - moderate complexity OR 35-49 mins
85837-Lmbqqylcge OBS or IP - moderate complexity OR 35-49 mins
Non-billable
72413-Ogaywcyxmo OBS or IP - moderate complexity OR 35-49 mins
81125-Vamdywbhyi OBS or IP - moderate complexity OR 35-49 mins
92160-Ndpjavvcmx OBS or IP - high complexity OR 50-79 mins

## 2024-07-30 NOTE — ED ADULT TRIAGE NOTE - CHIEF COMPLAINT QUOTE
had biliary stent placed last week ,30 mg ketorlac @0915 today had biliary stent placed last week ,30 mg ketorlac @0915 today iv right for arm #20 [Initial Visit ___] : an initial visit for [unfilled] [Questionnaire Received] : Patient questionnaire received [Intake Form Reviewed] : Patient intake form with past medical history, surgical history, family history and social history reviewed today [Urinary Incontinence] : urinary incontinence

## 2024-08-29 NOTE — PROGRESS NOTE ADULT - PROBLEM SELECTOR PLAN 9
Stable	
-DVT ppx: SQ heparin

## 2024-09-09 NOTE — PROGRESS NOTE ADULT - PROBLEM SELECTOR PROBLEM 1
September 9, 2024    Karla Aguirre  [address] [facility] [place] [address]             Alphonso Cota - [facility]  Pediatric Neurology  [address]  [facility] [address]  Phone: [phone]   September 9, 2024     Patient: Karla Aguirre   YOB: 2014   Date of Visit: 9/9/2024       To Whom it May Concern:    Karla Aguirre was seen in my clinic on 9/9/2024. She may return to school on 9/9/2024 .    Please excuse her from any classes or work missed.    If you have any questions or concerns, please don't hesitate to call.    Sincerely,     Jessica Sweeney MD     
Alcoholic gastritis

## 2024-10-06 NOTE — H&P ADULT - NSICDXFAMILYHX_GEN_ALL_CORE_FT
[facility]  PROGRESS NOTE     Patient Identification:  Name:  Simon Benitez  Age:  84 y.o.  Sex:  male  :  1939  MRN:  [id number]  Visit Number:  [id number]  ROOM: 105/2S     Primary Care Provider:  Provider, No Known    Length of stay in inpatient status:  1    Subjective     Chief Compliant: Bilateral subdural hematomas, status post ruben holes bilaterally    History of Presenting Illness: 84-year-old gentleman who recently been treated at [facility] for bilateral subdural hematomas.  Patient did have bur holes placed with evacuation of hematomas.  Also has a history of end-stage renal disease requiring hemodialysis, hypertension, CHF, BPH and does have a right internal jugular thrombus.  Patient has intermittent confusion at this time.  Still is requiring O2 at 3 L.  Prior to the most recent hospitalization had been on room air.  Appears to have some volume overload.    Objective     Current Hospital Meds:albuterol sulfate HFA, 2 puff, Inhalation, 4x Daily - RT  amLODIPine, 10 mg, Oral, Q24H  carvedilol, 12.5 mg, Oral, BID With Meals  docusate sodium, 100 mg, Oral, BID  FLUoxetine, 10 mg, Oral, Daily  fluticasone, 2 spray, Each Nare, Daily  gabapentin, 300 mg, Oral, Nightly  guaiFENesin, 600 mg, Oral, Q12H  hydrALAZINE, 100 mg, Oral, Q8H  pantoprazole, 40 mg, Oral, BID AC  sevelamer, 2,400 mg, Oral, TID With Meals  tamsulosin, 0.4 mg, Oral, Daily  torsemide, 20 mg, Oral, BID       ----------------------------------------------------------------------------------------------------------------------  Vital Signs:  Temp:  [98 °F (36.7 °C)-98.1 °F (36.7 °C)] 98.1 °F (36.7 °C)  Heart Rate:  [61-72] 61  Resp:  [16-22] 20  BP: (131-154)/(63-89) 131/63  SpO2:  [92 %-96 %] 94 %  on  Flow (L/min):  [3] 3;   Device (Oxygen Therapy): nasal cannula  Body mass index is 26.14 kg/m².    Wt Readings from Last 3 Encounters:   10/05/24 85 kg (187 lb 6.4 oz)     Intake & Output (last 3 days)          10/03 0701  10/04 0700 10/04 0701  10/05 0700 10/05 0701  10/06 0700 10/06 0701  10/07 0700    P.O.  120 120 240    Total Intake(mL/kg)  120 (1.4) 120 (1.4) 240 (2.8)    Urine (mL/kg/hr)   100 (0)     Dialysis   2000     Total Output   2100     Net  +120 -1980 +240                  Diet: Renal; Low Potassium; Texture: Regular (IDDSI 7); Fluid Consistency: Thin (IDDSI 0)  ----------------------------------------------------------------------------------------------------------------------  Physical exam:  Constitutional: No acute distress  HEENT: Status post bur holes on cranium bilaterally  Neck:   Supple  Cardiovascular: Regular rate and rhythm  Pulmonary/Chest: Clear but decreased breath sounds in the bases  Abdominal: Positive bowel sounds soft.   Musculoskeletal: No arthropathy  Neurological: Intermittent confusion no focal deficits  Skin: No rash  Peripheral vascular: No edema  Genitourinary:  ----------------------------------------------------------------------------------------------------------------------    Last echocardiogram:    ----------------------------------------------------------------------------------------------------------------------  Results from last 7 days   Lab Units 10/06/24  0009 10/05/24  0113   WBC 10*3/mm3 4.13 4.34   HEMOGLOBIN g/dL 9.8* 9.6*   HEMATOCRIT % 31.0* 31.3*   MCV fL 90.4 92.1   MCHC g/dL 31.6 30.7*   PLATELETS 10*3/mm3 138* 135*         Results from last 7 days   Lab Units 10/06/24  0009 10/05/24  0113   SODIUM mmol/L 131* 129*   POTASSIUM mmol/L 4.0 4.6   MAGNESIUM mg/dL  --  2.5*   CHLORIDE mmol/L 95* 95*   CO2 mmol/L 26.4 20.5*   BUN mg/dL 20 31*   CREATININE mg/dL 3.95* 5.41*   CALCIUM mg/dL 8.8 9.5   GLUCOSE mg/dL 117* 107*   ALBUMIN g/dL  --  3.8   BILIRUBIN mg/dL  --  0.5   ALK PHOS U/L  --  131*   AST (SGOT) U/L  --  16   ALT (SGPT) U/L  --  <5   Estimated Creatinine Clearance: 16.4 mL/min (A) (by C-G formula based on SCr of 3.95 mg/dL (H)).  No results found  "for: \"AMMONIA\"              No results found for: \"HGBA1C\", \"POCGLU\"  No results found for: \"TSH\", \"FREET4\"  No results found for: \"PREGTESTUR\", \"PREGSERUM\", \"HCG\", \"HCGQUANT\"  Pain Management Panel           No data to display              Brief Urine Lab Results       None          No results found for: \"BLOODCX\"      No results found for: \"URINECX\"  No results found for: \"WOUNDCX\"  No results found for: \"STOOLCX\"        I have personally looked at the labs and they are summarized above.  ----------------------------------------------------------------------------------------------------------------------  Detailed radiology reports for the last 24 hours:    Imaging Results (Last 24 Hours)       Procedure Component Value Units Date/Time    XR Chest 1 View [[id number]] Collected: 10/06/24 0310     Updated: 10/06/24 0313    Narrative:      PROCEDURE: Portable chest x-ray examination performed on October 5, 2024. Single view. Upright position.     HISTORY: Shortness of breath.     COMPARISON: None.     FINDINGS:     Enlarged heart size  Coarsened bronchovascular pattern to the lungs  Central pulmonary vascular congestion.  Small right and left pleural effusion, right greater than left.  Hypoinflated lungs  No fracture. No foreign body.  No free air in the upper abdomen.       Impression:         1.  Enlarged heart size.  2.  Small right and left pleural effusion, right greater than left.  3.  Hypoinflated lungs  4.  Central pulmonary vascular congestion.  5.  No pneumothorax.     This report was finalized on 10/6/2024 3:11 AM by Edmundo Guerra MD.             Final impressions for the last 30 days of radiology reports:    XR Chest 1 View    Result Date: 10/6/2024   1.  Enlarged heart size. 2.  Small right and left pleural effusion, right greater than left. 3.  Hypoinflated lungs 4.  Central pulmonary vascular congestion. 5.  No pneumothorax.  This report was finalized on 10/6/2024 3:11 AM by Edmundo Guerra MD.  "     XR chest AP portable    Result Date: 10/1/2024  Edema and effusions. Images reviewed, interpreted, and dictated by Dr. Corbin Hamm. Transcribed by Mary Melton PA-C.    CT Head Without Contrast    Result Date: 9/30/2024  Grossly stable bilateral cerebral convexity extra-axial heterogeneously hyperdense fluid collections, likely evolving bilateral subdural hemorrhages. Continued follow-up is recommended. CRITICAL RESULT: No. COMMUNICATION: Per this written report. Images personally reviewed, interpreted, and dictated by MARLIN Hyatt.    XR chest AP portable    Result Date: 9/28/2024  There has been no significant interval change  .  Continued follow-up recommended . Images reviewed, interpreted, and dictated by Dr. JOSÉ ANTONIO Guidry. Transcribed by Mary Melton PA-C.    XR chest AP portable    Result Date: 9/27/2024  Moderate to large bilateral pleural effusions with pulmonary edema and probable bibasilar atelectasis. Images reviewed, interpreted, and dictated by Dr. Corbin Hamm. Transcribed by Malinda Lyle PA-C.    XR chest AP portable    Result Date: 9/26/2024  No significant interval change. Continued follow-up is recommended. Images reviewed, interpreted, and dictated by Dr. Edmundo Sanchez. Transcribed by Malinda Lyle PA-C.    XR chest AP portable    Result Date: 9/25/2024  Slight interval improvement in pulmonary edema persistent bilateral pleural effusions. Images reviewed, interpreted, and dictated by Dr. Corbin Hamm. Transcribed by Manuel Hernandez PA-C    XR chest AP portable    Result Date: 9/24/2024  Interval worsening as above. Continued follow up recommended. Images reviewed, interpreted, and dictated by Dr. JOSÉ ANTONIO Guidry. Transcribed by Malinda Lyle PA-C.    CT Head Without Contrast    Result Date: 9/23/2024  Interval removal of surgical drains with interval frontal and parietal ruben hole surgery. Persistent, bilateral subdural fluid collections improved on the right and stable on  the left. Images reviewed, interpreted, and dictated by Dr. Corbin Hamm. Transcribed by Adeel Nelson PA-C.    XR chest AP portable    Result Date: 9/23/2024  Improved vascular congestion and edema. Stable small effusions and atelectasis.    Images reviewed, interpreted, and dictated by Dr. Corbin Hamm. Transcribed by Adeel Nelson PA-C.    XR chest AP portable    Result Date: 9/22/2024  No significant change. Images reviewed, interpreted, and dictated by Dr. Nam Murillo. Transcribed by Manuel Hernandez PA-C    XR chest AP portable    Result Date: 9/20/2024  Worsening bilateral mixed interstitial and alveolar opacities and bilateral pleural effusions. Images reviewed, interpreted, and dictated by Dr. Lori Montoya. Transcribed by Moni Jefferson PA-C.    CT Head Without Contrast    Result Date: 9/20/2024  1. Exam limited by motion. 2. Grossly stable bilateral mixed density subdural hemorrhages with stable right to left midline shift. Images reviewed, interpreted, and dictated by Lori Montoya MD    XR chest AP portable    Result Date: 9/19/2024  Increasing right pleural effusion. Vascular engorgement. Images reviewed, interpreted, and dictated by Dr. Nam Murillo. Transcribed by Adeel Nelson PA-C.    CT Head Without Contrast    Result Date: 9/19/2024  1. Interval placement of subdural drains in bilateral subdural hematomas. 2. Interval increase in density of the bilateral subdural hemorrhages, likely related to an acute component. Overall, the size of the hematomas has decreased slightly compared to prior exam. Images reviewed, interpreted, and dictated by Lori Montoya MD    CT brain with IV contrast    Result Date: 9/17/2024  Bilateral subdural hematomas causing diffuse sulcal effacement and relatively symmetric mass effect. Communication: Findings personally discussed by telephone with Dr. Pennington. 9/17/2024 1925 Images personally reviewed, interpreted and dictated by PRACHI Levi M.D.    XR chest AP  portable    Result Date: 9/17/2024  Lung findings are most compatible with cardiogenic pulmonary edema/CHF. Images personally reviewed, interpreted and dictated by PRACHI Levi M.D.    US DOPPLER VENOUS ARM RIGHT    Result Date: 9/16/2024  The ultrasound findings of the right internal jugular vein correlate with the CT findings, showing thrombus with occlusion of the right internal jugular vein within the lower neck region. Right upper extremity fistula is patent. No right upper extremity venous thrombus identified. Images reviewed, interpreted, and dictated by Jackie Beard MD   I have personally looked at the radiology images and read the final radiology report.    Assessment & Plan    Status post bilateral subdural hematomas with bur holes and evacuation of hematomas.  Patient was evaluated by PT, OT and speech therapy yesterday.  Patient was requiring minimum to moderate assistance for bed mobility; minimum assist for bed to chair transfers; minimum assist to contact-guard assistance for sit to stand and stand to sit transfers.  Was able to ambulate 110 feet with front wheel walker with contact-guard to minimum assistance.  Patient did have some dyspnea and did have O2 sats drop with ambulation yesterday.  Patient did require maximum assist for bathing; minimum assist for upper body dressing; maximum assist for lower body dressing; minimum assist for grooming; moderate assistance for toileting and minimum assist with feeding.  Patient was evaluated by speech therapy and did have some impairment in expressive language skills as well as receptive language skills.  Patient will continue to get formal speech-language cognitive therapy.    End-stage renal disease requiring hemodialysis.  Patient did get dialysis yesterday.  I have discussed the case with Dr. Walker this morning and he will reevaluate and consider taking more volume with next dialysis treatment.  Continue Renvela  2400 mg 3 times daily    Acute  hypoxic respiratory failure continue O2 at 3 L.  Talked with Dr Walker--- may benefit from more aggressive volume management with dialysis.  Chest x-ray did show some findings consistent with central pulmonary vascular congestion and had does have small bilateral pleural effusions.    CHF currently on Demadex 20 mg twice daily and again see above    Hypertension continue Coreg, Norvasc and hydralazine    Neuropathic pain gabapentin 300 mg nightly.  May consider decreasing dose at night to see if this is helpful regarding intermittent confusion.    BPH continue Flomax    GERD continue Protonix    Depression continue Prozac, Xanax as needed    Right internal jugular thrombus have held anticoagulation secondary to recent subdural bleeds    VTE Prophylaxis:   Mechanical    The patient is high risk due to the following diagnoses/reasons: Advanced age, end-stage renal disease.    Teo Guerrero MD  [facility]  10/06/24  10:08 EDT   FAMILY HISTORY:  FH: HTN (hypertension)

## 2024-10-07 NOTE — DISCHARGE NOTE PROVIDER - INSTRUCTIONS
Continue Regimen: - OTC Zyrtec Discontinue Regimen: - Benadryl\\n\\n- clobetasol 0.05 % topical cream : Apply to affected areas of back QD x 2 weeks Samples Given: Dermeleve Detail Level: Zone Plan: Labs WNL. No improvement with topical steroid. Patient reports using sensitive skin care products only. Will perform biopsy and send prednisone taper. Patient to continue oral antihistamine. She will try dermeleve sample and order if helpful Initiate Treatment: -prednisone 10 mg tablet : Take 4 tabs PO qam x 3 days, then take 2 tabs PO qam x 3 days, then take 1 tab PO qam x 3 days, then take 1/2 tab po qam x 2 days. Take in AM with food. Render In Strict Bullet Format?: No regular, low salt  supplement with ensure enlive

## 2024-11-25 NOTE — PROGRESS NOTE ADULT - PROBLEM/PLAN-4
PRIOR AUTHORIZATION DENIED    Medication: WEGOVY 0.25 MG/0.5ML SC SOAJ  Insurance Company: [employer] ([facility]) - Phone [phone] Fax [phone]  Denial Date: 11/20/2024  Denial Reason(s): Weight loss is excluded                
Prior Authorization  Please initiate PA, med/dosage not covered by insurance.    Medication: Semaglutide-Weight Management (WEGOVY) 0.25 MG/0.5ML pen     Insurance Name: [employer] [place]  Insurance ID: [id number]  Cover My Meds Key: [id number]    Pharmacy:    [id number] [address] AT [place] [address]  
Retail Pharmacy Prior Authorization Team   Phone: [phone]    PA Initiation    Medication: WEGOVY 0.25 MG/0.5ML SC SOAJ  Insurance Company: [employer] ([facility]) - Phone [phone] Fax [phone]  Pharmacy Filling the Rx: [facility] [id number] [place] [address] [address] AT [place] [address]  Filling Pharmacy Phone: [phone]  Filling Pharmacy Fax: [phone]  Start Date: 11/20/2024          
DISPLAY PLAN FREE TEXT

## 2024-12-21 NOTE — PROGRESS NOTE ADULT - PROVIDER SPECIALTY LIST ADULT
Gastroenterology
Internal Medicine
Discharged

## 2025-01-03 NOTE — PHYSICAL THERAPY INITIAL EVALUATION ADULT - WEIGHT-BEARING RESTRICTIONS: SIT/STAND, REHAB EVAL
January 3, 2025     Patient: Haylee Gr   YOB: 1988   Date of Visit: 1/3/2025       To Whom It May Concern:    It is my medical opinion that Haylee Gr may return to work on Saturday, January 4, 2025.  Please excuse her absence from work on Friday, January 3, 2025 due to influenza infection.             Sincerely,        LUKE Rae    CC: No Recipients      
full weight-bearing

## 2025-01-13 NOTE — PROGRESS NOTE ADULT - PROBLEM SELECTOR PLAN 10
Post-Procedure Surface Area (cm^2) 0.25 cm^2   Post-Procedure Volume (cm^3) 0.05 cm^3   Wound Assessment Dry   Drainage Amount Small (< 25%)   Drainage Description Serosanguinous   Odor None   Guillermina-wound Assessment Dry/flaky   Margins Defined edges   Wound Thickness Description not for Pressure Injury Full thickness   Stevan Scale   Sensory Perceptions 4   Moisture 4   Activity 4   Mobility 3   Nutrition 3   Friction and Shear 3   Stevan Scale Score 21   Wound Follow Up   Require Follow Up Yes     Multilayer Compression Wrap   (Not Unna) Below the Knee    NAME:  Malik Clarke  YOB: 1945  MEDICAL RECORD NUMBER:  [id number]  DATE:  1/13/2025    Multilayer compression wrap: Removed old Multilayer wrap if indicated and wash leg with mild soap/water.  Applied moisturizing agent to dry skin as needed.   Applied primary and secondary dressing as ordered.  Applied multilayered dressing below the knee to right lower leg.  Instructed patient/caregiver not to remove dressing and to keep it clean and dry.   Instructed patient/caregiver on complications to report to provider, such as pain, numbness in toes, heavy drainage, and slippage of dressing.  Instructed patient on purpose of compression dressing and on activity and exercise recommendations.      Electronically signed by Rafaela Barton RN on 1/13/2025 at 11:48 AM     COVID + on 12/13, persistently positive  - on RA, no iso indicated

## 2025-01-24 NOTE — PROGRESS NOTE ADULT - PROBLEM SELECTOR PLAN 7
Septic shock due to pseudomonal PNA with lung abscess, morganella bacteremia and kelbsiella UTI.  Was on Zosyn since 11/25, as per ID in [facility] who recommended 4 week course of antibiotics versus possibly longer pending imaging at 4 weeks (12/23)  - CXR 12/23 - Stable RUL lesion, with new RLL pneumonia   - seen by ID  - completed 6 wks zosyn 1/6  - per d/w ID no further imaging as 6 wks abx sufficient treatment normal...

## 2025-02-05 NOTE — DISCHARGE NOTE PROVIDER - EXTENDED VTE YES NO FOR MLM ENOXAPARIN
Size Of Lesion (Optional): 3.4 Validate Note Data When Using Inventory: Yes Treatment Number (Optional): 1 Concentration Of Kenalog Solution Injected (Mg/Ml): 5.0 Total Volume (Ccs): 0.5 Kenalog Preparation: Kenalog with normal saline Detail Level: Detailed Require Ndc Code?: No How Many Mls Were Removed From The 80 Mg/Ml (5ml) Vial When Preparing The Injectable Solution?: 0 Administered By (Optional): ACH X Size Of Lesion In Cm (Optional): 3.2 Medical Necessity Clause: This procedure was medically necessary because the lesions that were treated were: Which Kenalog Vial Was Used?: Kenalog 10 mg/ml (5 ml vial) Consent: The risks of atrophy were reviewed with the patient. Kenalog Type Of Vial: Multiple Dose How Many Mls Were Removed From The 10 Mg/Ml (5ml) Vial When Preparing The Injectable Solution?: 0.3 ,

## 2025-02-12 NOTE — DIETITIAN INITIAL EVALUATION ADULT - CALCULATED FROM (G/KG)
Anesthesia Post Evaluation    Patient: Elvia Meza    Procedure(s) Performed: Procedure(s) (LRB):  EGD (ESOPHAGOGASTRODUODENOSCOPY) (N/A)    Final Anesthesia Type: general      Patient location during evaluation: GI PACU  Patient participation: Yes- Able to Participate  Level of consciousness: awake and alert  Post-procedure vital signs: reviewed and stable  Pain management: adequate  Airway patency: patent    PONV status at discharge: No PONV  Anesthetic complications: no      Cardiovascular status: blood pressure returned to baseline and hemodynamically stable  Respiratory status: unassisted  Hydration status: euvolemic  Follow-up not needed.              Vitals Value Taken Time   /79 02/12/25 1217   Temp 36.6 °C (97.8 °F) 02/12/25 1203   Pulse 74 02/12/25 1222   Resp 20 02/12/25 1222   SpO2 99 % 02/12/25 1222   Vitals shown include unfiled device data.      Event Time   Out of Recovery 12:08:00         Pain/Nickie Score: Nickie Score: 10 (2/12/2025 11:53 AM)           66.72

## 2025-02-24 NOTE — CONSULT NOTE ADULT - ASSESSMENT
Impression:  #Hematemesis: CT with some thickening near EG junction, could be in setting of ulceration 2/2 stent. Stent look in place.   #Esophageal stricture w/ dysphagia -S/p EGD w/ stent placement 4/19 (fully covered WallFlex stent, 18 mm x 103 mm)  #Abd pain - likely 2/2 chronic pancreatitis. Unrelated to stricture. Possible neuropathic component.  #HCV s/p treatment (neg viral load)      Recommendations:  -NPO after midnight  -Plan for EGD tomorrow   -IV PPI BID    Discussed with Dr. Hinds    Recommendations preliminary until signed by attending.     Mike Harper MD  Gastroenterology/Hepatology Fellow  1st option: [phone] (text or call), ONLY available from 7:00 am to 5:00 pm.   **Contact on-call GI fellow via answering service ([phone]) from 5pm-7am AND on weekends/holidays**  2nd option: Available via Microsoft Teams  3rd option: Pager: [phone]         Detail Level: Generalized Include Location In Plan?: No Detail Level: Simple

## 2025-03-23 NOTE — CONSULT NOTE ADULT - ASSESSMENT
Subjective:       Patient ID: Lnia Davis is a 76 y.o. female.    Chief Complaint: Hand Pain    Hand Pain   Pertinent negatives include no chest pain (arm pain or jaw pain).    Pt with persistent L hand pain specifically her 4th and fifth fingers L forearm to the elbow (ulnar distribution).  Had CTS surgery years ago and recent (last year) surgery for her trigger finger of the 4th finger nomi is doing well.  The pain is burning and she has numbness/weakness. Had C spine surgery 5 years ago.  ALso has R sided neck pain that starts in the back of her head and goes to her R chest.  Needs cataract surgery as vision is very bad.  Review of Systems   Respiratory:  Negative for shortness of breath (PND or orthopnea).    Cardiovascular:  Negative for chest pain (arm pain or jaw pain).   Gastrointestinal:  Negative for abdominal pain, diarrhea, nausea and vomiting.   Genitourinary:  Negative for dysuria.   Neurological:  Negative for seizures, syncope and headaches.       Objective:      Physical Exam  Constitutional:       General: She is not in acute distress.     Appearance: She is well-developed.   HENT:      Head: Normocephalic.   Eyes:      Pupils: Pupils are equal, round, and reactive to light.   Neck:      Thyroid: No thyromegaly.      Vascular: No JVD.   Cardiovascular:      Rate and Rhythm: Normal rate and regular rhythm.      Heart sounds: Normal heart sounds. No murmur heard.     No friction rub. No gallop.   Pulmonary:      Effort: Pulmonary effort is normal.      Breath sounds: Normal breath sounds. No wheezing or rales.      Comments: Breasts: No masses, discharge or adenopathy bilaterally   Abdominal:      General: Bowel sounds are normal. There is no distension.      Palpations: Abdomen is soft. There is no mass.      Tenderness: There is no abdominal tenderness. There is no guarding or rebound.   Musculoskeletal:      Cervical back: Neck supple.   Lymphadenopathy:      Cervical: No cervical adenopathy.    Skin:     General: Skin is warm and dry.   Neurological:      Mental Status: She is alert and oriented to person, place, and time.      Deep Tendon Reflexes: Reflexes are normal and symmetric.   Psychiatric:         Behavior: Behavior normal.         Thought Content: Thought content normal.         Judgment: Judgment normal.         Assessment:       1. Cataract, unspecified cataract type, unspecified laterality    2. Neck pain    3. Nonintractable headache, unspecified chronicity pattern, unspecified headache type    4. Paresthesia of thumb of left hand    5. Screening mammogram, encounter for    6. Anemia, unspecified type    7. Type 2 diabetes mellitus with diabetic nephropathy, with long-term current use of insulin    8. Bilateral carotid artery stenosis    9. Need for pneumococcal vaccination        Plan:   Cataract, unspecified cataract type, unspecified laterality  -     Ambulatory referral/consult to Ophthalmology; Future; Expected date: 03/28/2025    Neck pain  -     MRI Cervical Spine Without Contrast; Future; Expected date: 03/21/2025    Nonintractable headache, unspecified chronicity pattern, unspecified headache type  -     CT Head Without Contrast; Future; Expected date: 03/21/2025  -     Sedimentation rate; Future; Expected date: 03/21/2025  -     C-Reactive Protein; Future; Expected date: 03/21/2025    Paresthesia of thumb of left hand  -     EMG W/ ULTRASOUND AND NERVE CONDUCTION TEST 1 Extremity; Future  -     pregabalin (LYRICA) 50 MG capsule; One at bedtime  Dispense: 30 capsule; Refill: 2  -     oxyCODONE-acetaminophen (PERCOCET) 5-325 mg per tablet; Take 1 tablet by mouth every 12 (twelve) hours as needed for Pain.  Dispense: 60 tablet; Refill: 0    Screening mammogram, encounter for  -     Mammo Digital Screening Bilat w/ Efrem (XPD); Future; Expected date: 09/21/2025    Anemia, unspecified type  -     CBC Auto Differential; Future; Expected date: 03/21/2025  -     Iron and TIBC; Future;  Expected date: 03/21/2025  -     Ferritin; Future; Expected date: 03/21/2025  -     SOLUBLE TRANSFERRIN RECEPTOR; Future; Expected date: 03/21/2025    Type 2 diabetes mellitus with diabetic nephropathy, with long-term current use of insulin  -     pioglitazone (ACTOS) 30 MG tablet; Take 1 tablet (30 mg total) by mouth once daily.  Dispense: 90 tablet; Refill: 3    Bilateral carotid artery stenosis  -     VAS US Carotid Bilateral; Future    Need for pneumococcal vaccination  -     pneumoc 20-ed conj-dip cr(PF) (PREVNAR-20 (PF)) injection Syrg 0.5 mL    Other orders  -     allopurinoL (ZYLOPRIM) 300 MG tablet; Take 1 tablet (300 mg total) by mouth once daily.  Dispense: 90 tablet; Refill: 3  -     amLODIPine (NORVASC) 10 MG tablet; Take 1 tablet (10 mg total) by mouth once daily.  Dispense: 90 tablet; Refill: 3  -     atorvastatin (LIPITOR) 40 MG tablet; Take 1 tablet (40 mg total) by mouth once daily.  Dispense: 90 tablet; Refill: 3  -     losartan (COZAAR) 100 MG tablet; Take 1 tablet (100 mg total) by mouth once daily.  Dispense: 90 tablet; Refill: 3  -     omeprazole (PRILOSEC) 20 MG capsule; TAKE 1 CAPSULE TWICE DAILY  Dispense: 180 capsule; Refill: 3         HPI:    61 yr  m,    h/o gout,  ex ETOH abuse, chronic pancreatitis, anemia, , CKD  takes  no meds  ep C s/p treatment, prior gastric perforation with peritonitis    resenting with lower   and  epigastic  pain . worsening  for 1 year.     patient  is  a poor historian,   denies any recent etoh useept known to renal (20 Dec 2021 18:23)  ------- As Above ------------- Patient is a poor historian ( as is his mom )   The patient presents with N/V and abdominal pain x 3 days although mom / chart states this has a been a longer problem. History of ETOH abuse but has stopped. They deny  NSAIDs  Patient is not feeling any better over the past 24 hours. CT scan c/w chronic pancreatitis. Patient found to have significant elevated creatinine  States that he had a colonoscopy 3 years ago. History of hepatitis C that was treated.     A) N/V, abdominal pain - R/O PUD, Uremia, chronic pancreatitis - 1) Nephrology f/u  2) EGD  3) PPI 4) Zofran PRN 5) abdominal sonogram 6) Creon 7) clear liquid diet  B) Microcytic anemia - 1) Stool occult 2) Fe/TIBC / Ferritin  C) history of hepatitis C  - 1) Bloods ordered.

## 2025-04-04 NOTE — PROGRESS NOTE ADULT - PROBLEM SELECTOR PLAN 5
Anemia is likely due to chronic disease due to Chronic Kidney Disease.     Plan  - Monitor serial CBC: Daily  - Transfuse PRBC if patient becomes hemodynamically unstable, symptomatic or H/H drops below 7/21.  - Patient has not received any PRBC transfusions to date  - Patient's anemia is currently stable. Hb of 7.5, dose of Procrit yesterday   S/p HD X 3  in ICU in [facility]   Cr plateaued at 5.7-6, off HD now  Avoid nephrotoxic, no contrast , no NSAID  VA renal duplex showing severe stenosis of the left renal artery   vascular consulted, no plan for intervention for HESHAM given normal blood pressures  [ ] BUE vein mapping, vascular planning on AVF placement this admission  Renally dose medications   nephro following    # Anemia of chronic disease  [facility] and s/p Retacrit IV Fe    #Hypocalcemia  - inonidez calcium 0.96, slightly low  - Corrected calcium is normal   - started on calitriol, intermittently refusing   - Continue to monitor

## 2025-05-13 NOTE — H&P ADULT - TIME BILLING
Preparing to see the patient including review of tests and other providers' notes, confirming history with patient, performing medical examination and evaluation, counseling and educating the patient, ordering medications, tests, documenting clinical information in the EMR, independently interpreting results and communicating results to the patient, care coordination
Quality 226: Preventive Care And Screening: Tobacco Use: Screening And Cessation Intervention: Patient screened for tobacco use and is an ex/non-smoker
Detail Level: Detailed

## 2025-05-21 NOTE — PROGRESS NOTE ADULT - PROBLEM SELECTOR PROBLEM 5
Hi Dr. Morales - I met with patient for MTM today. Previously tried both Viagra and Levitra at the max doses without benefit. Would it be ok to try Cialis as needed? I've pended a prescription for your sign-off if appropriate. Patient is scheduled with you next on 6/12.    Soraida Liu, PharmD, BCACP  Medication Therapy Management Pharmacist   Pancreatitis

## 2025-06-03 NOTE — PATIENT PROFILE ADULT - HAVE YOU HAD A FIRST COVID-19 BOOSTER?
Old referral closed out has the patient never returned the call to [facility].   Referral pending.    No

## 2025-06-18 NOTE — DISCHARGE NOTE PROVIDER - NSDCQMSTROKE_NEU_ALL_CORE
Clinic Note  6/18/2025      Subjective:         Chief Complaint:   HPI  Anthony Reynaga is a 72 y.o. male history of BPH and elevated PSA. S/p biopsy (B9) in 2 /11. Moderate symptoms, minimal bother.  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 History of kidney stones and prostatitis. Is on  Uroxatrol. Talked to Bashir about Rezum.  Hernia repair in July 2016.  LUTS: bothered by nocturia 4-6x /noc. Also bothered by decreased ejaculate and decreased orgasm intensity.  Stopped finasteride 4 year ago. Is thinking about alf.        3/6/2020 PHI- PSA 5, 24% free, PHI score- 25  5/11/2021 PHI- PSA 5.3, 25% free, PHI score 24.     6/21/22: He returns to clinic today for f/u. PHI from today pending. He plans on retiring on the 30th of this month. States his urinary frequency has improved on Uroxatrol, but hesitancy and intermittency has worsened. He interested in discussing surgical therapy for his BPH with obstructive LUTS. He is currently not interested in obtaining a MRI at this time.   post void residual 95 ccs     6/22/2023- PSA 6/14/2023- 3.0 (corrected 6.0). Met with Dr. Camejo in January to discuss BPH therapies.  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 Proscar helping (nocturia improved from 6 to 2 times/noc).  Reviewed [place] age specific ranges.     6/20/2024-On finasteride for LUTS. LUTS- symptoms stable.  PSA 2.5 (corrected 5.0).  Just spent 3 weeks in [place].    6/19/2025- PSA 2.1 (corrected 4.2). On finasteride. He complains of nocturia x 2-8/ night. He denies LE edema. He denies snoring. He does drink fluid in the evening. He reports good urinary stream.       Lab Results   Component Value Date    PSA 2.10 06/13/2025    PSA 3.80 10/04/2012    PSA 4.10 (H) 03/01/2012    PSA 3.2 10/13/2011    PSADIAG 5.7 (H) 10/30/2019    PSADIAG 3.6 10/19/2018    PSADIAG 2.1 09/20/2017    PSADIAG 3.0 09/14/2016    PSADIAG 4.9 (H) 03/11/2016    PSADIAG 4.6 (H) 10/20/2015    PSADIAG 3.2 10/14/2014     PSADIAG 3.5 10/08/2013      Past Medical History:   Diagnosis Date    Anticoagulant long-term use     Atrial fibrillation     Bilateral nephrolithiasis     Bladder papilloma     1990    BPH (benign prostatic hyperplasia)     Cataract     Elevated PSA     Floaters     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    Hernia     bilateral inquinal    Hypertension     Inguinal hernia     Kidney stone     Knee injury     Trigger finger of left hand     Urethritis     [id number] nonbacterial     Family History   Problem Relation Name Age of Onset    Cancer Mother Victoria Reynaga         Terminal Renal Cancer    Hypertension Mother Victoria Reynaga     Kidney cancer Mother Victoria Reynaga     Dementia Father Darnell Reynaga     Hypertension Father Darnell Reynaga     Benign prostatic hyperplasia Neg Hx       Social History[1]  Past Surgical History:   Procedure Laterality Date    APPENDECTOMY      BLADDER SURGERY      polyp removed benign    COLONOSCOPY N/A 06/22/2016    Procedure: COLONOSCOPY;  Surgeon: Joaquin Francis MD;  [facility]  Service: Endoscopy;  Laterality: N/A;    Thanks for letting us know.  I would approve him to hold coumadin x 3 days prior, without lovenox.  We will see him for an INR on 6/8 and will provide him with procedure instructions at that time., per [facility]    COLONOSCOPY N/A 05/15/2023    Procedure: COLONOSCOPY;  Surgeon: Noam Hollingsworth MD;  Location: [facility] ([address] [place] [address]);  Service: Endoscopy;  Laterality: N/A;  instr portal-tb-eliquis hold ok see te11/11/22  Ok to hold Eliquis- See t/e 3/30/23, instr via portal - PC  golytely  5/9 - precall done - [facility]    CYSTOSCOPY      benign bladder papilloma    FINGER SURGERY      HERNIA REPAIR Left 2016    inguinal    LITHOTRIPSY  2015    Right wrist fracture Right 2019    Navicular fracture    TONSILLECTOMY, ADENOIDECTOMY       Problem List[2]  Review of Systems   Constitutional:  Negative for chills, fever and unexpected weight change.  "  HENT:  Negative for congestion and sore throat.    Respiratory:  Negative for cough and shortness of breath.    Cardiovascular:  Negative for chest pain and leg swelling.   Gastrointestinal:  Negative for abdominal pain, nausea and vomiting.   Genitourinary:  Positive for nocturia. Negative for dysuria and flank pain.   Neurological:  Negative for weakness.   Psychiatric/Behavioral:  Negative for agitation and confusion.          Objective:      There were no vitals taken for this visit.  Estimated body mass index is 23.71 kg/m² as calculated from the following:    Height as of 5/23/25: 5' 11" (1.803 m).    Weight as of 5/23/25: 77.1 kg (170 lb).  Physical Exam  Constitutional:       General: He is not in acute distress.  HENT:      Head: Normocephalic.   Cardiovascular:      Rate and Rhythm: Normal rate.   Pulmonary:      Effort: Pulmonary effort is normal.   Abdominal:      General: There is no distension.      Palpations: Abdomen is soft.      Tenderness: There is no abdominal tenderness.   Genitourinary:     Comments: SHANTA no nodules or indurations  Musculoskeletal:         General: Normal range of motion.   Skin:     General: Skin is warm and dry.   Neurological:      Mental Status: He is alert.      Coordination: Coordination normal.   Psychiatric:         Behavior: Behavior normal.           Assessment and Plan:   PSA in 1 yr  Discussed behavioral modifications for nocturia including limiting fluids four hours before going to sleep and avoiding bladder irritants.   code applied: patient requires or will require a continuous, longitudinal, and active collaborative plan of care related to this patient's health condition, the management of which requires the direction of a practitioner with specialized clinical knowledge, skill, and expertise.        Problem List Items Addressed This Visit       Benign prostatic hyperplasia with nocturia    Overview   - no issues during the day however will have nocturia " about 2-6 times at night   -currently on finasteride   -followed by urology         Elevated PSA - Primary    Overview   - initial elevated PSA started in 2010  -biopsies negative  05/23/2024 PSA 2.5  - followed by urology he would like to avoid any invasive interventions              Follow up:       Livan Lowry               [1]   Social History  Socioeconomic History    Marital status: Significant Other    Number of children: 0   Tobacco Use    Smoking status: Never    Smokeless tobacco: Never   Substance and Sexual Activity    Alcohol use: Yes     Alcohol/week: 1.0 standard drink of alcohol     Types: 1 Cans of beer per week     Comment: 1-2 cans of beer 2-3 times per month    Drug use: No    Sexual activity: Yes     Partners: Female     Birth control/protection: OCP   Social History Narrative    Lives with long term partner who is also a [employer] Professor. 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 Walks and would like to start doing strength training. Nonsmoker. No children     Social Drivers of Health     Financial Resource Strain: High Risk (4/18/2024)    Overall Financial Resource Strain (CARDIA)     Difficulty of Paying Living Expenses: Very hard   Food Insecurity: No Food Insecurity (4/18/2024)    Hunger Vital Sign     Worried About Running Out of Food in the Last Year: Never true     Ran Out of Food in the Last Year: Never true   Transportation Needs: No Transportation Needs (4/18/2024)    PRAPARE - Transportation     Lack of Transportation (Medical): No     Lack of Transportation (Non-Medical): No   Physical Activity: Sufficiently Active (4/18/2024)    Exercise Vital Sign     Days of Exercise per Week: 5 days     Minutes of Exercise per Session: 40 min   Recent Concern: Physical Activity - Insufficiently Active (3/25/2024)    Exercise Vital Sign     Days of Exercise per Week: 3 days     Minutes of Exercise per Session: 30 min   Stress: No Stress Concern Present (4/18/2024)    [place] [employer]  Occupational [employer] - Occupational Stress Questionnaire     Feeling of Stress : Only a little   Recent Concern: Stress - Stress Concern Present (3/25/2024)    [place] [employer]ational [employer] - Occupational Stress Questionnaire     Feeling of Stress : To some extent   Housing Stability: Low Risk  (4/18/2024)    Housing Stability Vital Sign     Unable to Pay for Housing in the Last Year: No     Homeless in the Last Year: No   [2]   Patient Active Problem List  Diagnosis    Paroxysmal atrial fibrillation    PVD (posterior vitreous detachment)    NS (nuclear sclerosis), bilateral    Benign prostatic hyperplasia with nocturia    Long term current use of anticoagulant therapy    Aortic regurgitation    Hypertension, essential    Nephrolithiasis    Hyperfunctional dysphonia    Mixed hyperlipidemia    PVC (premature ventricular contraction)    Mild aortic stenosis    Posterior vitreous detachment, bilateral    Chronic bilateral low back pain without sciatica    Aortic calcification    Elevated PSA    Right inguinal hernia    Chronic cough    Anosmia    Ageusia    Poor posture    Pain in both lower extremities    History of bladder disorder    Impaired memory    Lumbar spondylolysis    Leg weakness    Hyperparathyroidism    Trigger ring finger of left hand    Proteinuria    Decreased functional mobility and endurance    Abnormal results of thyroid function studies      No

## 2025-06-27 NOTE — PATIENT PROFILE ADULT - HAVE YOU EXPERIENCED VIOLENCE OR A TRAUMATIC EVENT?
Caller: Kandi Fuentes    Relationship: Self    Best call back number: [phone]    What is the best time to reach you: ANYTIME     Who are you requesting to speak with (clinical staff, provider,  specific staff member): CLINICAL    What was the call regarding: PT REQUESTING A CALL BACK REGARDING HER THYROID MEDICATION         no

## (undated) DEVICE — BITE BLOCK ADULT 20 X 27MM (GREEN)

## (undated) DEVICE — POSITIONER STRAP ARMBOARD VELCRO TS-30

## (undated) DEVICE — ELCTR ECG CONDUCTIVE ADHESIVE

## (undated) DEVICE — GOWN XL

## (undated) DEVICE — DRSG 2X2

## (undated) DEVICE — CATH INSERTION TRAY W 10CC SYRINGE

## (undated) DEVICE — PREP CHLORAPREP HI-LITE ORANGE 26ML

## (undated) DEVICE — STAPLER COVIDIEN ENDO GIA SHORT HANDLE

## (undated) DEVICE — BASIN EMESIS 10IN GRADUATED MAUVE

## (undated) DEVICE — CONTAINER FORMALIN 80ML YELLOW

## (undated) DEVICE — BIOPSY FORCEP RADIAL JAW 4 STANDARD WITH NEEDLE

## (undated) DEVICE — SUT PROLENE 6-0 24" BV-1

## (undated) DEVICE — LINE BREATHE SAMPLNG

## (undated) DEVICE — LABELS BLANK W PEN

## (undated) DEVICE — PACK IV START WITH CHG

## (undated) DEVICE — DRSG CURITY GAUZE SPONGE 4 X 4" 12-PLY NON-STERILE

## (undated) DEVICE — Device

## (undated) DEVICE — PACK MINOR WITH LAP

## (undated) DEVICE — SUT VICRYL 2-0 18" TIES UNDYED

## (undated) DEVICE — DRSG CURITY GAUZE SPONGE 4 X 4" 12-PLY

## (undated) DEVICE — DENTURE CUP PINK

## (undated) DEVICE — SUT SILK 0 18" TIES

## (undated) DEVICE — POSITIONER PATIENT SAFETY STRAP 3X60"

## (undated) DEVICE — CONTAINER FORMALIN 10% 60ML

## (undated) DEVICE — TUBING HIGH POWER CONTRAST INJ

## (undated) DEVICE — SUT VICRYL 3-0 27" SH UNDYED

## (undated) DEVICE — SALIVA EJECTOR (BLUE)

## (undated) DEVICE — SUT SILK 4-0 30" SH

## (undated) DEVICE — WARMING BLANKET LOWER ADULT

## (undated) DEVICE — ELCTR BOVIE TIP BLADE INSULATED 2.75" EDGE

## (undated) DEVICE — LUBRICATING JELLY HR ONE SHOT 3G

## (undated) DEVICE — DRSG TEGADERM 4X4.75"

## (undated) DEVICE — TORQUE DEVICE FOR GUIDEWIRE 0.0100.038"

## (undated) DEVICE — TUBING MEDI-VAC W MAXIGRIP CONNECTORS 1/4"X6'

## (undated) DEVICE — GLV 7.5 PROTEXIS (CREAM) MICRO

## (undated) DEVICE — SUT PROLENE 3-0 36" SH

## (undated) DEVICE — SUT SILK 3-0 18" SH (POP-OFF)

## (undated) DEVICE — TUBING IV SET GRAVITY 3Y 100" MACRO

## (undated) DEVICE — URETERAL CATH RED RUBBER 14FR (GREEN)

## (undated) DEVICE — SYR LUER LOK 5CC

## (undated) DEVICE — SOL IRR POUR H2O 1500ML

## (undated) DEVICE — SUT VICRYL 3-0 54" REEL UNDYED

## (undated) DEVICE — CATH IV SAFE BC 22G X 1" (BLUE)

## (undated) DEVICE — DRAIN RESERVOIR FOR JACKSON PRATT 100CC CARDINAL

## (undated) DEVICE — PACK AV FISTULA

## (undated) DEVICE — UNDERPAD LINEN SAVER 17 X 24"

## (undated) DEVICE — CLAMP BULLDOG MIDI 45 DEGREE (GREEN) DISP

## (undated) DEVICE — SUCTION YANKAUER NO CONTROL VENT

## (undated) DEVICE — DRSG TAPE UMBILICAL COTTON 2" X 30 X 1/8"

## (undated) DEVICE — HANDPIECE SINGLE FUNCTION ABC

## (undated) DEVICE — SOL BAG NS 0.9% 1000ML

## (undated) DEVICE — DRSG BANDAID 0.75X3"

## (undated) DEVICE — FOLEY CATH 2-WAY 16FR 5CC SILICONE

## (undated) DEVICE — GOWN LG

## (undated) DEVICE — SYR LUER LOK 10CC

## (undated) DEVICE — BIOPSY FORCEP COLD DISP

## (undated) DEVICE — TAPE SILK 3"

## (undated) DEVICE — CLAMP BX HOT RAD JAW 3

## (undated) DEVICE — SUT MONOCRYL 4-0 27" PS-2 UNDYED

## (undated) DEVICE — INFLATOR ENCORE 26

## (undated) DEVICE — SYR LUER LOK 20CC

## (undated) DEVICE — DEVICE GRASPING RAPTOR MINI 1.9X200

## (undated) DEVICE — GLV 8 PROTEXIS (CREAM) MICRO

## (undated) DEVICE — SUT OVERSTITCH POLYPROPYLENE 2-0

## (undated) DEVICE — CANISTER DISPOSABLE THIN WALL 3000CC

## (undated) DEVICE — DRAPE TOWEL BLUE 17" X 24"

## (undated) DEVICE — SUT PDS II 0 18" ENDOLOOP LIGATURE

## (undated) DEVICE — DRAPE COVER SNAP 36X30"

## (undated) DEVICE — SUT PROLENE 4-0 36" RB-1

## (undated) DEVICE — SUT SILK 4-0 17-18"

## (undated) DEVICE — SOL IRR BAG NS 0.9% 1000ML

## (undated) DEVICE — SUT SILK 2-0 18" TIES

## (undated) DEVICE — SUT PROLENE 3-0 36" MH

## (undated) DEVICE — STAPLER SKIN VISI-STAT 35 WIDE

## (undated) DEVICE — VENODYNE/SCD SLEEVE CALF MEDIUM

## (undated) DEVICE — FEEDING TUBE NG ARGYLE PVC 8FR 41CM

## (undated) DEVICE — SOL IRR POUR NS 0.9% 1500ML

## (undated) DEVICE — CONTAINER FORMALIN 10% 20ML

## (undated) DEVICE — BLADE SURGICAL #11 CARBON

## (undated) DEVICE — VESSEL LOOP MINI-BLUE 0.075" X 16"

## (undated) DEVICE — DRAPE 3/4 SHEET 52X76"

## (undated) DEVICE — SOL IRR POUR NS 0.9% 500ML

## (undated) DEVICE — SUT VICRYL 0 27" CT-1 UNDYED

## (undated) DEVICE — ELCTR GROUNDING PAD ADULT COVIDIEN

## (undated) DEVICE — SUT VICRYL 2 27" TP-1 UNDYED

## (undated) DEVICE — SUT SILK 3-0 18" TIES

## (undated) DEVICE — DRAPE FEMORAL ANGIOGRAPHY W TROUGH

## (undated) DEVICE — SUT OVERSTITCH CLINCH

## (undated) DEVICE — DRAPE HAND 77" X 146"

## (undated) DEVICE — DRSG TELFA 3 X 8

## (undated) DEVICE — NDL HYPO SAFE 25G X 5/8" (ORANGE)

## (undated) DEVICE — FACESHIELD FULL VISOR

## (undated) DEVICE — SYNOVIS VASCULAR PROBE 1.5MM 15CM

## (undated) DEVICE — BLADE SURGICAL #15 CARBON

## (undated) DEVICE — BAG URINE W METER 2L

## (undated) DEVICE — STAPLER SKIN MULTI DIRECTION W35

## (undated) DEVICE — PROTECTOR HEEL / ELBOW FLUFFY

## (undated) DEVICE — FEEDING TUBE NG KANGAROO POLYURETHANE 5FR 51CM

## (undated) DEVICE — ELCTR BOVIE TIP BLADE INSULATED 6.5" EDGE

## (undated) DEVICE — SUT PDS II 3-0 27" SH

## (undated) DEVICE — SUT OVERSTITCH ENDOSCOPIC SYS

## (undated) DEVICE — DURABLE MEDICAL EQUIPMENT: Type: DURABLE MEDICAL EQUIPMENT

## (undated) DEVICE — GEL AQUSNC PACKET 20GR

## (undated) DEVICE — PACK MAJOR ABDOMINAL WITH LAP

## (undated) DEVICE — DEVICE GRASPING RAPTOR

## (undated) DEVICE — FORCEP RADIAL JAW 4 PEDIATRIC W NDL 1.8MM 2MM 160CM DISP

## (undated) DEVICE — BAG DECANTER DISP

## (undated) DEVICE — POOLE SUCTION TIP

## (undated) DEVICE — LIGASURE IMPACT

## (undated) DEVICE — DRSG TEGADERM 2.5X3"

## (undated) DEVICE — SUT VICRYL 0 18" TIES UNDYED

## (undated) DEVICE — SYR ALLIANCE II INFLATION 60ML

## (undated) DEVICE — TUBING SUCTION NONCONDUCTIVE 6MM X 12FT

## (undated) DEVICE — GLV 8 PROTEXIS (WHITE)

## (undated) DEVICE — COVER PROBE W/GEL 18X120CM STRL 50/BX

## (undated) DEVICE — SUT PROLENE 7-0 24" BV-1

## (undated) DEVICE — DRSG PREVENA PEEL & PLACE KIT 20CM

## (undated) DEVICE — SUT BOOT STANDARD (YELLOW) 5 PAIR

## (undated) DEVICE — SUT PROLENE 5-0 36" RB-1